# Patient Record
Sex: MALE | Race: BLACK OR AFRICAN AMERICAN | NOT HISPANIC OR LATINO | Employment: OTHER | URBAN - METROPOLITAN AREA
[De-identification: names, ages, dates, MRNs, and addresses within clinical notes are randomized per-mention and may not be internally consistent; named-entity substitution may affect disease eponyms.]

---

## 2017-01-04 ENCOUNTER — GENERIC CONVERSION - ENCOUNTER (OUTPATIENT)
Dept: OTHER | Facility: OTHER | Age: 72
End: 2017-01-04

## 2017-01-23 ENCOUNTER — ALLSCRIPTS OFFICE VISIT (OUTPATIENT)
Dept: OTHER | Facility: OTHER | Age: 72
End: 2017-01-23

## 2017-01-23 DIAGNOSIS — N39.0 URINARY TRACT INFECTION: ICD-10-CM

## 2017-01-23 DIAGNOSIS — I10 ESSENTIAL (PRIMARY) HYPERTENSION: ICD-10-CM

## 2017-01-23 DIAGNOSIS — I25.10 ATHEROSCLEROTIC HEART DISEASE OF NATIVE CORONARY ARTERY WITHOUT ANGINA PECTORIS: ICD-10-CM

## 2017-01-23 DIAGNOSIS — C67.9 MALIGNANT NEOPLASM OF BLADDER (HCC): ICD-10-CM

## 2017-02-02 ENCOUNTER — APPOINTMENT (OUTPATIENT)
Dept: LAB | Facility: HOSPITAL | Age: 72
End: 2017-02-02
Attending: INTERNAL MEDICINE
Payer: MEDICARE

## 2017-02-02 ENCOUNTER — GENERIC CONVERSION - ENCOUNTER (OUTPATIENT)
Dept: OTHER | Facility: OTHER | Age: 72
End: 2017-02-02

## 2017-02-02 DIAGNOSIS — C67.9 MALIGNANT NEOPLASM OF BLADDER (HCC): ICD-10-CM

## 2017-02-02 DIAGNOSIS — N39.0 URINARY TRACT INFECTION: ICD-10-CM

## 2017-02-02 LAB
BACTERIA UR QL AUTO: ABNORMAL /HPF
BILIRUB UR QL STRIP: NEGATIVE
CLARITY UR: ABNORMAL
COLOR UR: YELLOW
GLUCOSE UR STRIP-MCNC: ABNORMAL MG/DL
HGB UR QL STRIP.AUTO: ABNORMAL
KETONES UR STRIP-MCNC: NEGATIVE MG/DL
LEUKOCYTE ESTERASE UR QL STRIP: ABNORMAL
NITRITE UR QL STRIP: NEGATIVE
NON-SQ EPI CELLS URNS QL MICRO: ABNORMAL /HPF
PH UR STRIP.AUTO: 5.5 [PH] (ref 4.5–8)
PROT UR STRIP-MCNC: ABNORMAL MG/DL
RBC #/AREA URNS AUTO: ABNORMAL /HPF
SP GR UR STRIP.AUTO: 1.02 (ref 1–1.03)
UROBILINOGEN UR QL STRIP.AUTO: 0.2 E.U./DL
WBC #/AREA URNS AUTO: ABNORMAL /HPF

## 2017-02-02 PROCEDURE — 81001 URINALYSIS AUTO W/SCOPE: CPT

## 2017-02-02 PROCEDURE — 87086 URINE CULTURE/COLONY COUNT: CPT

## 2017-02-03 LAB — BACTERIA UR CULT: NORMAL

## 2017-02-08 ENCOUNTER — GENERIC CONVERSION - ENCOUNTER (OUTPATIENT)
Dept: OTHER | Facility: OTHER | Age: 72
End: 2017-02-08

## 2017-02-22 ENCOUNTER — HOSPITAL ENCOUNTER (INPATIENT)
Facility: HOSPITAL | Age: 72
LOS: 8 days | Discharge: HOME HEALTH | DRG: 574 | End: 2017-03-02
Attending: PODIATRIST | Admitting: PODIATRIST
Payer: MEDICARE

## 2017-02-22 ENCOUNTER — APPOINTMENT (INPATIENT)
Dept: MRI IMAGING | Facility: HOSPITAL | Age: 72
DRG: 574 | End: 2017-02-22
Payer: MEDICARE

## 2017-02-22 ENCOUNTER — GENERIC CONVERSION - ENCOUNTER (OUTPATIENT)
Dept: OTHER | Facility: OTHER | Age: 72
End: 2017-02-22

## 2017-02-22 ENCOUNTER — APPOINTMENT (INPATIENT)
Dept: RADIOLOGY | Facility: HOSPITAL | Age: 72
DRG: 574 | End: 2017-02-22
Payer: MEDICARE

## 2017-02-22 DIAGNOSIS — I15.9 SECONDARY HYPERTENSION: ICD-10-CM

## 2017-02-22 DIAGNOSIS — E78.5 HYPERLIPIDEMIA, UNSPECIFIED HYPERLIPIDEMIA TYPE: ICD-10-CM

## 2017-02-22 DIAGNOSIS — I25.10 ATHEROSCLEROSIS OF CORONARY ARTERY OF NATIVE HEART WITHOUT ANGINA PECTORIS, UNSPECIFIED VESSEL OR LESION TYPE: Primary | ICD-10-CM

## 2017-02-22 DIAGNOSIS — D09.0 CARCINOMA IN SITU OF BLADDER: ICD-10-CM

## 2017-02-22 DIAGNOSIS — L03.119 CELLULITIS AND ABSCESS OF FOOT: ICD-10-CM

## 2017-02-22 DIAGNOSIS — E11.622 TYPE 2 DIABETES MELLITUS WITH OTHER SKIN ULCER (HCC): ICD-10-CM

## 2017-02-22 DIAGNOSIS — J44.9 CHRONIC OBSTRUCTIVE PULMONARY DISEASE, UNSPECIFIED COPD TYPE (HCC): ICD-10-CM

## 2017-02-22 DIAGNOSIS — S91.331A PUNCTURE WOUND OF FOOT, RIGHT, INITIAL ENCOUNTER: ICD-10-CM

## 2017-02-22 DIAGNOSIS — L02.619 CELLULITIS AND ABSCESS OF FOOT: ICD-10-CM

## 2017-02-22 DIAGNOSIS — L98.499 TYPE 2 DIABETES MELLITUS WITH OTHER SKIN ULCER (HCC): ICD-10-CM

## 2017-02-22 PROBLEM — E11.628 DIABETIC INFECTION OF RIGHT FOOT (HCC): Status: ACTIVE | Noted: 2017-02-22

## 2017-02-22 PROBLEM — L08.9 DIABETIC INFECTION OF RIGHT FOOT (HCC): Status: ACTIVE | Noted: 2017-02-22

## 2017-02-22 LAB
ALBUMIN SERPL BCP-MCNC: 3.1 G/DL (ref 3.5–5)
ALP SERPL-CCNC: 108 U/L (ref 46–116)
ALT SERPL W P-5'-P-CCNC: 59 U/L (ref 12–78)
ANION GAP SERPL CALCULATED.3IONS-SCNC: 7 MMOL/L (ref 4–13)
AST SERPL W P-5'-P-CCNC: 29 U/L (ref 5–45)
ATRIAL RATE: 85 BPM
BASOPHILS # BLD AUTO: 0.02 THOUSANDS/ΜL (ref 0–0.1)
BASOPHILS NFR BLD AUTO: 0 % (ref 0–1)
BILIRUB SERPL-MCNC: 0.76 MG/DL (ref 0.2–1)
BUN SERPL-MCNC: 23 MG/DL (ref 5–25)
CALCIUM SERPL-MCNC: 8.6 MG/DL (ref 8.3–10.1)
CHLORIDE SERPL-SCNC: 102 MMOL/L (ref 100–108)
CO2 SERPL-SCNC: 30 MMOL/L (ref 21–32)
CREAT SERPL-MCNC: 1.71 MG/DL (ref 0.6–1.3)
EOSINOPHIL # BLD AUTO: 0.06 THOUSAND/ΜL (ref 0–0.61)
EOSINOPHIL NFR BLD AUTO: 0 % (ref 0–6)
ERYTHROCYTE [DISTWIDTH] IN BLOOD BY AUTOMATED COUNT: 13.2 % (ref 11.6–15.1)
ERYTHROCYTE [SEDIMENTATION RATE] IN BLOOD: 62 MM/HOUR (ref 0–10)
EST. AVERAGE GLUCOSE BLD GHB EST-MCNC: 217 MG/DL
GFR SERPL CREATININE-BSD FRML MDRD: 47.7 ML/MIN/1.73SQ M
GLUCOSE SERPL-MCNC: 109 MG/DL (ref 65–140)
GLUCOSE SERPL-MCNC: 220 MG/DL (ref 65–140)
GLUCOSE SERPL-MCNC: 68 MG/DL (ref 65–140)
HBA1C MFR BLD: 9.2 % (ref 4.2–6.3)
HCT VFR BLD AUTO: 34.4 % (ref 36.5–49.3)
HGB BLD-MCNC: 11.8 G/DL (ref 12–17)
LYMPHOCYTES # BLD AUTO: 1.56 THOUSANDS/ΜL (ref 0.6–4.47)
LYMPHOCYTES NFR BLD AUTO: 10 % (ref 14–44)
MCH RBC QN AUTO: 31.1 PG (ref 26.8–34.3)
MCHC RBC AUTO-ENTMCNC: 34.3 G/DL (ref 31.4–37.4)
MCV RBC AUTO: 91 FL (ref 82–98)
MONOCYTES # BLD AUTO: 1.13 THOUSAND/ΜL (ref 0.17–1.22)
MONOCYTES NFR BLD AUTO: 7 % (ref 4–12)
NEUTROPHILS # BLD AUTO: 12.88 THOUSANDS/ΜL (ref 1.85–7.62)
NEUTS SEG NFR BLD AUTO: 83 % (ref 43–75)
NRBC BLD AUTO-RTO: 0 /100 WBCS
P AXIS: 57 DEGREES
PLATELET # BLD AUTO: 154 THOUSANDS/UL (ref 149–390)
PMV BLD AUTO: 11.3 FL (ref 8.9–12.7)
POTASSIUM SERPL-SCNC: 4.1 MMOL/L (ref 3.5–5.3)
PR INTERVAL: 180 MS
PROT SERPL-MCNC: 7.2 G/DL (ref 6.4–8.2)
QRS AXIS: -8 DEGREES
QRSD INTERVAL: 94 MS
QT INTERVAL: 344 MS
QTC INTERVAL: 409 MS
RBC # BLD AUTO: 3.79 MILLION/UL (ref 3.88–5.62)
SODIUM SERPL-SCNC: 139 MMOL/L (ref 136–145)
T WAVE AXIS: 52 DEGREES
VENTRICULAR RATE: 85 BPM
WBC # BLD AUTO: 15.65 THOUSAND/UL (ref 4.31–10.16)

## 2017-02-22 PROCEDURE — 87205 SMEAR GRAM STAIN: CPT | Performed by: PODIATRIST

## 2017-02-22 PROCEDURE — 73630 X-RAY EXAM OF FOOT: CPT

## 2017-02-22 PROCEDURE — 87070 CULTURE OTHR SPECIMN AEROBIC: CPT | Performed by: PODIATRIST

## 2017-02-22 PROCEDURE — 73720 MRI LWR EXTREMITY W/O&W/DYE: CPT

## 2017-02-22 PROCEDURE — 85025 COMPLETE CBC W/AUTO DIFF WBC: CPT | Performed by: PODIATRIST

## 2017-02-22 PROCEDURE — 87040 BLOOD CULTURE FOR BACTERIA: CPT | Performed by: PODIATRIST

## 2017-02-22 PROCEDURE — 93005 ELECTROCARDIOGRAM TRACING: CPT | Performed by: PODIATRIST

## 2017-02-22 PROCEDURE — 82948 REAGENT STRIP/BLOOD GLUCOSE: CPT

## 2017-02-22 PROCEDURE — 80053 COMPREHEN METABOLIC PANEL: CPT | Performed by: PODIATRIST

## 2017-02-22 PROCEDURE — 87147 CULTURE TYPE IMMUNOLOGIC: CPT | Performed by: PODIATRIST

## 2017-02-22 PROCEDURE — 71020 HB CHEST X-RAY 2VW FRONTAL&LATL: CPT

## 2017-02-22 PROCEDURE — 83036 HEMOGLOBIN GLYCOSYLATED A1C: CPT | Performed by: PODIATRIST

## 2017-02-22 PROCEDURE — 85652 RBC SED RATE AUTOMATED: CPT | Performed by: PODIATRIST

## 2017-02-22 PROCEDURE — 86317 IMMUNOASSAY INFECTIOUS AGENT: CPT | Performed by: PODIATRIST

## 2017-02-22 RX ORDER — PANTOPRAZOLE SODIUM 40 MG/1
40 TABLET, DELAYED RELEASE ORAL
Status: DISCONTINUED | OUTPATIENT
Start: 2017-02-23 | End: 2017-03-02 | Stop reason: HOSPADM

## 2017-02-22 RX ORDER — DOCUSATE SODIUM 100 MG/1
100 CAPSULE, LIQUID FILLED ORAL 2 TIMES DAILY
Status: DISCONTINUED | OUTPATIENT
Start: 2017-02-22 | End: 2017-03-02 | Stop reason: HOSPADM

## 2017-02-22 RX ORDER — ISOSORBIDE MONONITRATE 30 MG/1
30 TABLET, EXTENDED RELEASE ORAL DAILY
Status: DISCONTINUED | OUTPATIENT
Start: 2017-02-23 | End: 2017-03-02 | Stop reason: HOSPADM

## 2017-02-22 RX ORDER — INSULIN GLARGINE 100 [IU]/ML
55 INJECTION, SOLUTION SUBCUTANEOUS
Status: DISCONTINUED | OUTPATIENT
Start: 2017-02-22 | End: 2017-03-02 | Stop reason: HOSPADM

## 2017-02-22 RX ORDER — FLUTICASONE PROPIONATE 50 MCG
2 SPRAY, SUSPENSION (ML) NASAL
Status: DISCONTINUED | OUTPATIENT
Start: 2017-02-23 | End: 2017-03-02 | Stop reason: HOSPADM

## 2017-02-22 RX ORDER — ACETAMINOPHEN 325 MG/1
650 TABLET ORAL EVERY 6 HOURS PRN
Status: DISCONTINUED | OUTPATIENT
Start: 2017-02-22 | End: 2017-03-02 | Stop reason: HOSPADM

## 2017-02-22 RX ORDER — GABAPENTIN 300 MG/1
600 CAPSULE ORAL
Status: DISCONTINUED | OUTPATIENT
Start: 2017-02-22 | End: 2017-03-02 | Stop reason: HOSPADM

## 2017-02-22 RX ORDER — ONDANSETRON 2 MG/ML
4 INJECTION INTRAMUSCULAR; INTRAVENOUS EVERY 6 HOURS PRN
Status: DISCONTINUED | OUTPATIENT
Start: 2017-02-22 | End: 2017-03-02 | Stop reason: HOSPADM

## 2017-02-22 RX ORDER — METRONIDAZOLE 500 MG/1
500 TABLET ORAL EVERY 8 HOURS SCHEDULED
Status: DISCONTINUED | OUTPATIENT
Start: 2017-02-22 | End: 2017-02-24

## 2017-02-22 RX ORDER — METOPROLOL SUCCINATE 50 MG/1
100 TABLET, EXTENDED RELEASE ORAL DAILY
Status: DISCONTINUED | OUTPATIENT
Start: 2017-02-23 | End: 2017-03-02 | Stop reason: HOSPADM

## 2017-02-22 RX ORDER — OXYCODONE HYDROCHLORIDE 5 MG/1
5 TABLET ORAL EVERY 4 HOURS PRN
Status: DISCONTINUED | OUTPATIENT
Start: 2017-02-22 | End: 2017-02-26 | Stop reason: SDUPTHER

## 2017-02-22 RX ORDER — HEPARIN SODIUM 5000 [USP'U]/ML
5000 INJECTION, SOLUTION INTRAVENOUS; SUBCUTANEOUS EVERY 8 HOURS SCHEDULED
Status: DISCONTINUED | OUTPATIENT
Start: 2017-02-22 | End: 2017-03-02 | Stop reason: HOSPADM

## 2017-02-22 RX ORDER — ASPIRIN 81 MG/1
81 TABLET, CHEWABLE ORAL DAILY
Status: DISCONTINUED | OUTPATIENT
Start: 2017-02-23 | End: 2017-03-02 | Stop reason: HOSPADM

## 2017-02-22 RX ORDER — AZELASTINE 1 MG/ML
1 SPRAY, METERED NASAL 2 TIMES DAILY
Status: DISCONTINUED | OUTPATIENT
Start: 2017-02-22 | End: 2017-03-02 | Stop reason: HOSPADM

## 2017-02-22 RX ORDER — FUROSEMIDE 20 MG/1
20 TABLET ORAL
Status: DISCONTINUED | OUTPATIENT
Start: 2017-02-22 | End: 2017-02-24

## 2017-02-22 RX ADMIN — DOCUSATE SODIUM 100 MG: 100 CAPSULE, LIQUID FILLED ORAL at 19:35

## 2017-02-22 RX ADMIN — INSULIN GLARGINE 55 UNITS: 100 INJECTION, SOLUTION SUBCUTANEOUS at 21:36

## 2017-02-22 RX ADMIN — FUROSEMIDE 20 MG: 20 TABLET ORAL at 19:35

## 2017-02-22 RX ADMIN — GABAPENTIN 600 MG: 300 CAPSULE ORAL at 21:36

## 2017-02-22 RX ADMIN — METRONIDAZOLE 500 MG: 500 TABLET ORAL at 19:35

## 2017-02-22 RX ADMIN — BIMATOPROST 1 DROP: 0.1 SOLUTION/ DROPS OPHTHALMIC at 21:40

## 2017-02-22 RX ADMIN — METRONIDAZOLE 500 MG: 500 TABLET ORAL at 21:36

## 2017-02-22 RX ADMIN — CEFAZOLIN SODIUM 2000 MG: 2 SOLUTION INTRAVENOUS at 19:29

## 2017-02-22 RX ADMIN — HEPARIN SODIUM 5000 UNITS: 5000 INJECTION, SOLUTION INTRAVENOUS; SUBCUTANEOUS at 21:36

## 2017-02-22 RX ADMIN — OXYCODONE HYDROCHLORIDE 5 MG: 5 TABLET ORAL at 21:36

## 2017-02-23 ENCOUNTER — GENERIC CONVERSION - ENCOUNTER (OUTPATIENT)
Dept: OTHER | Facility: OTHER | Age: 72
End: 2017-02-23

## 2017-02-23 LAB
ANION GAP SERPL CALCULATED.3IONS-SCNC: 6 MMOL/L (ref 4–13)
BUN SERPL-MCNC: 23 MG/DL (ref 5–25)
CALCIUM SERPL-MCNC: 8.5 MG/DL (ref 8.3–10.1)
CHLORIDE SERPL-SCNC: 102 MMOL/L (ref 100–108)
CO2 SERPL-SCNC: 30 MMOL/L (ref 21–32)
CREAT SERPL-MCNC: 1.52 MG/DL (ref 0.6–1.3)
ERYTHROCYTE [DISTWIDTH] IN BLOOD BY AUTOMATED COUNT: 12.9 % (ref 11.6–15.1)
GFR SERPL CREATININE-BSD FRML MDRD: 54.6 ML/MIN/1.73SQ M
GLUCOSE SERPL-MCNC: 129 MG/DL (ref 65–140)
GLUCOSE SERPL-MCNC: 144 MG/DL (ref 65–140)
GLUCOSE SERPL-MCNC: 145 MG/DL (ref 65–140)
GLUCOSE SERPL-MCNC: 148 MG/DL (ref 65–140)
GLUCOSE SERPL-MCNC: 91 MG/DL (ref 65–140)
HCT VFR BLD AUTO: 35.4 % (ref 36.5–49.3)
HGB BLD-MCNC: 12.1 G/DL (ref 12–17)
MCH RBC QN AUTO: 30.9 PG (ref 26.8–34.3)
MCHC RBC AUTO-ENTMCNC: 34.2 G/DL (ref 31.4–37.4)
MCV RBC AUTO: 91 FL (ref 82–98)
PLATELET # BLD AUTO: 169 THOUSANDS/UL (ref 149–390)
PMV BLD AUTO: 10.6 FL (ref 8.9–12.7)
POTASSIUM SERPL-SCNC: 3.9 MMOL/L (ref 3.5–5.3)
RBC # BLD AUTO: 3.91 MILLION/UL (ref 3.88–5.62)
SODIUM SERPL-SCNC: 138 MMOL/L (ref 136–145)
WBC # BLD AUTO: 14.27 THOUSAND/UL (ref 4.31–10.16)

## 2017-02-23 PROCEDURE — A9585 GADOBUTROL INJECTION: HCPCS | Performed by: PODIATRIST

## 2017-02-23 PROCEDURE — 82948 REAGENT STRIP/BLOOD GLUCOSE: CPT

## 2017-02-23 PROCEDURE — 85027 COMPLETE CBC AUTOMATED: CPT | Performed by: PODIATRIST

## 2017-02-23 PROCEDURE — 80048 BASIC METABOLIC PNL TOTAL CA: CPT | Performed by: PODIATRIST

## 2017-02-23 RX ADMIN — INSULIN LISPRO 15 UNITS: 100 INJECTION, SOLUTION INTRAVENOUS; SUBCUTANEOUS at 12:08

## 2017-02-23 RX ADMIN — OXYCODONE HYDROCHLORIDE 5 MG: 5 TABLET ORAL at 01:43

## 2017-02-23 RX ADMIN — GABAPENTIN 600 MG: 300 CAPSULE ORAL at 21:44

## 2017-02-23 RX ADMIN — HEPARIN SODIUM 5000 UNITS: 5000 INJECTION, SOLUTION INTRAVENOUS; SUBCUTANEOUS at 13:37

## 2017-02-23 RX ADMIN — PANTOPRAZOLE SODIUM 40 MG: 40 TABLET, DELAYED RELEASE ORAL at 05:43

## 2017-02-23 RX ADMIN — FUROSEMIDE 20 MG: 20 TABLET ORAL at 17:11

## 2017-02-23 RX ADMIN — METRONIDAZOLE 500 MG: 500 TABLET ORAL at 13:37

## 2017-02-23 RX ADMIN — CEFAZOLIN SODIUM 2000 MG: 2 SOLUTION INTRAVENOUS at 09:20

## 2017-02-23 RX ADMIN — DOCUSATE SODIUM 100 MG: 100 CAPSULE, LIQUID FILLED ORAL at 17:19

## 2017-02-23 RX ADMIN — CEFAZOLIN SODIUM 2000 MG: 2 SOLUTION INTRAVENOUS at 00:50

## 2017-02-23 RX ADMIN — CEFAZOLIN SODIUM 2000 MG: 2 SOLUTION INTRAVENOUS at 17:11

## 2017-02-23 RX ADMIN — INSULIN GLARGINE 55 UNITS: 100 INJECTION, SOLUTION SUBCUTANEOUS at 21:43

## 2017-02-23 RX ADMIN — OXYCODONE HYDROCHLORIDE 5 MG: 5 TABLET ORAL at 13:51

## 2017-02-23 RX ADMIN — METOPROLOL SUCCINATE 100 MG: 50 TABLET, EXTENDED RELEASE ORAL at 08:30

## 2017-02-23 RX ADMIN — INSULIN LISPRO 15 UNITS: 100 INJECTION, SOLUTION INTRAVENOUS; SUBCUTANEOUS at 08:31

## 2017-02-23 RX ADMIN — HEPARIN SODIUM 5000 UNITS: 5000 INJECTION, SOLUTION INTRAVENOUS; SUBCUTANEOUS at 21:43

## 2017-02-23 RX ADMIN — INSULIN LISPRO 15 UNITS: 100 INJECTION, SOLUTION INTRAVENOUS; SUBCUTANEOUS at 17:11

## 2017-02-23 RX ADMIN — ISOSORBIDE MONONITRATE 30 MG: 30 TABLET, EXTENDED RELEASE ORAL at 08:30

## 2017-02-23 RX ADMIN — METRONIDAZOLE 500 MG: 500 TABLET ORAL at 21:44

## 2017-02-23 RX ADMIN — HEPARIN SODIUM 5000 UNITS: 5000 INJECTION, SOLUTION INTRAVENOUS; SUBCUTANEOUS at 05:43

## 2017-02-23 RX ADMIN — ASPIRIN 81 MG 81 MG: 81 TABLET ORAL at 08:30

## 2017-02-23 RX ADMIN — GADOBUTROL 11 ML: 604.72 INJECTION INTRAVENOUS at 01:11

## 2017-02-23 RX ADMIN — BIMATOPROST 1 DROP: 0.1 SOLUTION/ DROPS OPHTHALMIC at 21:43

## 2017-02-23 RX ADMIN — DOCUSATE SODIUM 100 MG: 100 CAPSULE, LIQUID FILLED ORAL at 08:30

## 2017-02-23 RX ADMIN — METRONIDAZOLE 500 MG: 500 TABLET ORAL at 05:43

## 2017-02-23 RX ADMIN — FUROSEMIDE 20 MG: 20 TABLET ORAL at 08:30

## 2017-02-23 RX ADMIN — OXYCODONE HYDROCHLORIDE 5 MG: 5 TABLET ORAL at 05:47

## 2017-02-24 LAB
ANION GAP SERPL CALCULATED.3IONS-SCNC: 9 MMOL/L (ref 4–13)
BACTERIA WND AEROBE CULT: NORMAL
BUN SERPL-MCNC: 19 MG/DL (ref 5–25)
C TETANI IGG SER IA-ACNC: 5.68 IU/ML
CALCIUM SERPL-MCNC: 8.6 MG/DL (ref 8.3–10.1)
CHLORIDE SERPL-SCNC: 100 MMOL/L (ref 100–108)
CO2 SERPL-SCNC: 30 MMOL/L (ref 21–32)
CREAT SERPL-MCNC: 1.36 MG/DL (ref 0.6–1.3)
ERYTHROCYTE [DISTWIDTH] IN BLOOD BY AUTOMATED COUNT: 13.1 % (ref 11.6–15.1)
GFR SERPL CREATININE-BSD FRML MDRD: >60 ML/MIN/1.73SQ M
GLUCOSE SERPL-MCNC: 104 MG/DL (ref 65–140)
GLUCOSE SERPL-MCNC: 114 MG/DL (ref 65–140)
GLUCOSE SERPL-MCNC: 197 MG/DL (ref 65–140)
GLUCOSE SERPL-MCNC: 96 MG/DL (ref 65–140)
GLUCOSE SERPL-MCNC: 97 MG/DL (ref 65–140)
GRAM STN SPEC: NORMAL
GRAM STN SPEC: NORMAL
HCT VFR BLD AUTO: 34 % (ref 36.5–49.3)
HGB BLD-MCNC: 11.7 G/DL (ref 12–17)
MCH RBC QN AUTO: 31.4 PG (ref 26.8–34.3)
MCHC RBC AUTO-ENTMCNC: 34.4 G/DL (ref 31.4–37.4)
MCV RBC AUTO: 91 FL (ref 82–98)
PLATELET # BLD AUTO: 168 THOUSANDS/UL (ref 149–390)
PMV BLD AUTO: 11.3 FL (ref 8.9–12.7)
POTASSIUM SERPL-SCNC: 3.7 MMOL/L (ref 3.5–5.3)
PREALB SERPL-MCNC: 12.2 MG/DL (ref 18–40)
RBC # BLD AUTO: 3.73 MILLION/UL (ref 3.88–5.62)
SODIUM SERPL-SCNC: 139 MMOL/L (ref 136–145)
WBC # BLD AUTO: 12.2 THOUSAND/UL (ref 4.31–10.16)

## 2017-02-24 PROCEDURE — 80048 BASIC METABOLIC PNL TOTAL CA: CPT | Performed by: PODIATRIST

## 2017-02-24 PROCEDURE — 85027 COMPLETE CBC AUTOMATED: CPT | Performed by: PODIATRIST

## 2017-02-24 PROCEDURE — 84134 ASSAY OF PREALBUMIN: CPT | Performed by: PODIATRIST

## 2017-02-24 PROCEDURE — 82948 REAGENT STRIP/BLOOD GLUCOSE: CPT

## 2017-02-24 RX ORDER — FUROSEMIDE 20 MG/1
20 TABLET ORAL DAILY
Status: DISCONTINUED | OUTPATIENT
Start: 2017-02-24 | End: 2017-03-02 | Stop reason: HOSPADM

## 2017-02-24 RX ADMIN — DOCUSATE SODIUM 100 MG: 100 CAPSULE, LIQUID FILLED ORAL at 08:47

## 2017-02-24 RX ADMIN — HEPARIN SODIUM 5000 UNITS: 5000 INJECTION, SOLUTION INTRAVENOUS; SUBCUTANEOUS at 05:01

## 2017-02-24 RX ADMIN — PANTOPRAZOLE SODIUM 40 MG: 40 TABLET, DELAYED RELEASE ORAL at 05:01

## 2017-02-24 RX ADMIN — METRONIDAZOLE 500 MG: 500 TABLET ORAL at 05:01

## 2017-02-24 RX ADMIN — DOCUSATE SODIUM 100 MG: 100 CAPSULE, LIQUID FILLED ORAL at 17:05

## 2017-02-24 RX ADMIN — INSULIN LISPRO 15 UNITS: 100 INJECTION, SOLUTION INTRAVENOUS; SUBCUTANEOUS at 11:41

## 2017-02-24 RX ADMIN — GABAPENTIN 600 MG: 300 CAPSULE ORAL at 22:07

## 2017-02-24 RX ADMIN — OXYCODONE HYDROCHLORIDE 5 MG: 5 TABLET ORAL at 19:25

## 2017-02-24 RX ADMIN — OXYCODONE HYDROCHLORIDE 5 MG: 5 TABLET ORAL at 13:30

## 2017-02-24 RX ADMIN — METRONIDAZOLE 500 MG: 500 TABLET ORAL at 13:26

## 2017-02-24 RX ADMIN — ASPIRIN 81 MG 81 MG: 81 TABLET ORAL at 08:47

## 2017-02-24 RX ADMIN — ISOSORBIDE MONONITRATE 30 MG: 30 TABLET, EXTENDED RELEASE ORAL at 08:47

## 2017-02-24 RX ADMIN — CEFAZOLIN SODIUM 2000 MG: 2 SOLUTION INTRAVENOUS at 17:05

## 2017-02-24 RX ADMIN — HEPARIN SODIUM 5000 UNITS: 5000 INJECTION, SOLUTION INTRAVENOUS; SUBCUTANEOUS at 13:26

## 2017-02-24 RX ADMIN — OXYCODONE HYDROCHLORIDE 5 MG: 5 TABLET ORAL at 04:11

## 2017-02-24 RX ADMIN — HEPARIN SODIUM 5000 UNITS: 5000 INJECTION, SOLUTION INTRAVENOUS; SUBCUTANEOUS at 22:07

## 2017-02-24 RX ADMIN — METOPROLOL SUCCINATE 100 MG: 50 TABLET, EXTENDED RELEASE ORAL at 08:47

## 2017-02-24 RX ADMIN — OXYCODONE HYDROCHLORIDE 5 MG: 5 TABLET ORAL at 09:44

## 2017-02-24 RX ADMIN — INSULIN LISPRO 2 UNITS: 100 INJECTION, SOLUTION INTRAVENOUS; SUBCUTANEOUS at 11:41

## 2017-02-24 RX ADMIN — CEFAZOLIN SODIUM 2000 MG: 2 SOLUTION INTRAVENOUS at 08:48

## 2017-02-24 RX ADMIN — INSULIN LISPRO 15 UNITS: 100 INJECTION, SOLUTION INTRAVENOUS; SUBCUTANEOUS at 08:49

## 2017-02-24 RX ADMIN — CEFAZOLIN SODIUM 2000 MG: 2 SOLUTION INTRAVENOUS at 01:44

## 2017-02-24 RX ADMIN — BIMATOPROST 1 DROP: 0.1 SOLUTION/ DROPS OPHTHALMIC at 22:07

## 2017-02-24 RX ADMIN — OXYCODONE HYDROCHLORIDE 5 MG: 5 TABLET ORAL at 00:11

## 2017-02-24 RX ADMIN — FUROSEMIDE 20 MG: 20 TABLET ORAL at 08:47

## 2017-02-24 RX ADMIN — OXYCODONE HYDROCHLORIDE 5 MG: 5 TABLET ORAL at 23:39

## 2017-02-24 RX ADMIN — INSULIN LISPRO 15 UNITS: 100 INJECTION, SOLUTION INTRAVENOUS; SUBCUTANEOUS at 16:38

## 2017-02-25 ENCOUNTER — ANESTHESIA (INPATIENT)
Dept: PERIOP | Facility: HOSPITAL | Age: 72
DRG: 574 | End: 2017-02-25
Payer: MEDICARE

## 2017-02-25 ENCOUNTER — ANESTHESIA EVENT (INPATIENT)
Dept: PERIOP | Facility: HOSPITAL | Age: 72
DRG: 574 | End: 2017-02-25
Payer: MEDICARE

## 2017-02-25 LAB
ANION GAP SERPL CALCULATED.3IONS-SCNC: 9 MMOL/L (ref 4–13)
BUN SERPL-MCNC: 20 MG/DL (ref 5–25)
CALCIUM SERPL-MCNC: 8.5 MG/DL (ref 8.3–10.1)
CHLORIDE SERPL-SCNC: 100 MMOL/L (ref 100–108)
CO2 SERPL-SCNC: 29 MMOL/L (ref 21–32)
CREAT SERPL-MCNC: 1.34 MG/DL (ref 0.6–1.3)
ERYTHROCYTE [DISTWIDTH] IN BLOOD BY AUTOMATED COUNT: 13 % (ref 11.6–15.1)
GFR SERPL CREATININE-BSD FRML MDRD: >60 ML/MIN/1.73SQ M
GLUCOSE SERPL-MCNC: 102 MG/DL (ref 65–140)
GLUCOSE SERPL-MCNC: 114 MG/DL (ref 65–140)
GLUCOSE SERPL-MCNC: 123 MG/DL (ref 65–140)
GLUCOSE SERPL-MCNC: 133 MG/DL (ref 65–140)
GLUCOSE SERPL-MCNC: 269 MG/DL (ref 65–140)
HCT VFR BLD AUTO: 35 % (ref 36.5–49.3)
HGB BLD-MCNC: 12 G/DL (ref 12–17)
MCH RBC QN AUTO: 31.4 PG (ref 26.8–34.3)
MCHC RBC AUTO-ENTMCNC: 34.3 G/DL (ref 31.4–37.4)
MCV RBC AUTO: 92 FL (ref 82–98)
PLATELET # BLD AUTO: 169 THOUSANDS/UL (ref 149–390)
PMV BLD AUTO: 11 FL (ref 8.9–12.7)
POTASSIUM SERPL-SCNC: 4 MMOL/L (ref 3.5–5.3)
RBC # BLD AUTO: 3.82 MILLION/UL (ref 3.88–5.62)
SODIUM SERPL-SCNC: 138 MMOL/L (ref 136–145)
WBC # BLD AUTO: 12.78 THOUSAND/UL (ref 4.31–10.16)

## 2017-02-25 PROCEDURE — 85027 COMPLETE CBC AUTOMATED: CPT | Performed by: PODIATRIST

## 2017-02-25 PROCEDURE — 80048 BASIC METABOLIC PNL TOTAL CA: CPT | Performed by: PODIATRIST

## 2017-02-25 PROCEDURE — 82948 REAGENT STRIP/BLOOD GLUCOSE: CPT

## 2017-02-25 RX ORDER — POLYETHYLENE GLYCOL 3350 17 G/17G
17 POWDER, FOR SOLUTION ORAL DAILY
Status: DISCONTINUED | OUTPATIENT
Start: 2017-02-25 | End: 2017-03-02 | Stop reason: HOSPADM

## 2017-02-25 RX ADMIN — AZELASTINE HYDROCHLORIDE 1 SPRAY: 137 SPRAY, METERED NASAL at 17:19

## 2017-02-25 RX ADMIN — CEFAZOLIN SODIUM 2000 MG: 2 SOLUTION INTRAVENOUS at 17:18

## 2017-02-25 RX ADMIN — DOCUSATE SODIUM 100 MG: 100 CAPSULE, LIQUID FILLED ORAL at 08:51

## 2017-02-25 RX ADMIN — INSULIN LISPRO 15 UNITS: 100 INJECTION, SOLUTION INTRAVENOUS; SUBCUTANEOUS at 11:38

## 2017-02-25 RX ADMIN — ISOSORBIDE MONONITRATE 30 MG: 30 TABLET, EXTENDED RELEASE ORAL at 08:51

## 2017-02-25 RX ADMIN — HEPARIN SODIUM 5000 UNITS: 5000 INJECTION, SOLUTION INTRAVENOUS; SUBCUTANEOUS at 14:19

## 2017-02-25 RX ADMIN — GABAPENTIN 600 MG: 300 CAPSULE ORAL at 22:21

## 2017-02-25 RX ADMIN — PANTOPRAZOLE SODIUM 40 MG: 40 TABLET, DELAYED RELEASE ORAL at 05:22

## 2017-02-25 RX ADMIN — CEFAZOLIN SODIUM 2000 MG: 2 SOLUTION INTRAVENOUS at 10:14

## 2017-02-25 RX ADMIN — INSULIN LISPRO 15 UNITS: 100 INJECTION, SOLUTION INTRAVENOUS; SUBCUTANEOUS at 08:50

## 2017-02-25 RX ADMIN — OXYCODONE HYDROCHLORIDE 5 MG: 5 TABLET ORAL at 22:21

## 2017-02-25 RX ADMIN — METOPROLOL SUCCINATE 100 MG: 50 TABLET, EXTENDED RELEASE ORAL at 08:50

## 2017-02-25 RX ADMIN — INSULIN LISPRO 3 UNITS: 100 INJECTION, SOLUTION INTRAVENOUS; SUBCUTANEOUS at 11:39

## 2017-02-25 RX ADMIN — FUROSEMIDE 20 MG: 20 TABLET ORAL at 08:50

## 2017-02-25 RX ADMIN — CEFAZOLIN SODIUM 2000 MG: 2 SOLUTION INTRAVENOUS at 01:28

## 2017-02-25 RX ADMIN — INSULIN LISPRO 15 UNITS: 100 INJECTION, SOLUTION INTRAVENOUS; SUBCUTANEOUS at 16:34

## 2017-02-25 RX ADMIN — HEPARIN SODIUM 5000 UNITS: 5000 INJECTION, SOLUTION INTRAVENOUS; SUBCUTANEOUS at 05:22

## 2017-02-25 RX ADMIN — BIMATOPROST 1 DROP: 0.1 SOLUTION/ DROPS OPHTHALMIC at 22:21

## 2017-02-25 RX ADMIN — POLYETHYLENE GLYCOL 3350 17 G: 17 POWDER, FOR SOLUTION ORAL at 10:14

## 2017-02-25 RX ADMIN — ASPIRIN 81 MG 81 MG: 81 TABLET ORAL at 08:51

## 2017-02-25 RX ADMIN — BISACODYL 10 MG: 5 TABLET, COATED ORAL at 10:15

## 2017-02-26 LAB
ANION GAP SERPL CALCULATED.3IONS-SCNC: 8 MMOL/L (ref 4–13)
BUN SERPL-MCNC: 20 MG/DL (ref 5–25)
CALCIUM SERPL-MCNC: 8.7 MG/DL (ref 8.3–10.1)
CHLORIDE SERPL-SCNC: 98 MMOL/L (ref 100–108)
CO2 SERPL-SCNC: 32 MMOL/L (ref 21–32)
CREAT SERPL-MCNC: 1.39 MG/DL (ref 0.6–1.3)
ERYTHROCYTE [DISTWIDTH] IN BLOOD BY AUTOMATED COUNT: 13 % (ref 11.6–15.1)
GFR SERPL CREATININE-BSD FRML MDRD: >60 ML/MIN/1.73SQ M
GLUCOSE SERPL-MCNC: 172 MG/DL (ref 65–140)
GLUCOSE SERPL-MCNC: 176 MG/DL (ref 65–140)
GLUCOSE SERPL-MCNC: 190 MG/DL (ref 65–140)
GLUCOSE SERPL-MCNC: 198 MG/DL (ref 65–140)
GLUCOSE SERPL-MCNC: 234 MG/DL (ref 65–140)
GLUCOSE SERPL-MCNC: 306 MG/DL (ref 65–140)
HCT VFR BLD AUTO: 37.2 % (ref 36.5–49.3)
HGB BLD-MCNC: 12.7 G/DL (ref 12–17)
MCH RBC QN AUTO: 31.6 PG (ref 26.8–34.3)
MCHC RBC AUTO-ENTMCNC: 34.1 G/DL (ref 31.4–37.4)
MCV RBC AUTO: 93 FL (ref 82–98)
PLATELET # BLD AUTO: 178 THOUSANDS/UL (ref 149–390)
PMV BLD AUTO: 10.4 FL (ref 8.9–12.7)
POTASSIUM SERPL-SCNC: 4.7 MMOL/L (ref 3.5–5.3)
RBC # BLD AUTO: 4.02 MILLION/UL (ref 3.88–5.62)
SODIUM SERPL-SCNC: 138 MMOL/L (ref 136–145)
WBC # BLD AUTO: 14.4 THOUSAND/UL (ref 4.31–10.16)

## 2017-02-26 PROCEDURE — 80048 BASIC METABOLIC PNL TOTAL CA: CPT | Performed by: PODIATRIST

## 2017-02-26 PROCEDURE — 82948 REAGENT STRIP/BLOOD GLUCOSE: CPT

## 2017-02-26 PROCEDURE — 87205 SMEAR GRAM STAIN: CPT | Performed by: PODIATRIST

## 2017-02-26 PROCEDURE — 2W1SX6Z COMPRESSION OF RIGHT FOOT USING PRESSURE DRESSING: ICD-10-PCS | Performed by: PODIATRIST

## 2017-02-26 PROCEDURE — 0JBQ0ZZ EXCISION OF RIGHT FOOT SUBCUTANEOUS TISSUE AND FASCIA, OPEN APPROACH: ICD-10-PCS | Performed by: PODIATRIST

## 2017-02-26 PROCEDURE — 87147 CULTURE TYPE IMMUNOLOGIC: CPT | Performed by: PODIATRIST

## 2017-02-26 PROCEDURE — 85027 COMPLETE CBC AUTOMATED: CPT | Performed by: PODIATRIST

## 2017-02-26 PROCEDURE — 87070 CULTURE OTHR SPECIMN AEROBIC: CPT | Performed by: PODIATRIST

## 2017-02-26 PROCEDURE — 0J9Q0ZX DRAINAGE OF RIGHT FOOT SUBCUTANEOUS TISSUE AND FASCIA, OPEN APPROACH, DIAGNOSTIC: ICD-10-PCS | Performed by: PODIATRIST

## 2017-02-26 PROCEDURE — 87176 TISSUE HOMOGENIZATION CULTR: CPT | Performed by: PODIATRIST

## 2017-02-26 PROCEDURE — 87075 CULTR BACTERIA EXCEPT BLOOD: CPT | Performed by: PODIATRIST

## 2017-02-26 PROCEDURE — 0HRMXK3 REPLACEMENT OF RIGHT FOOT SKIN WITH NONAUTOLOGOUS TISSUE SUBSTITUTE, FULL THICKNESS, EXTERNAL APPROACH: ICD-10-PCS | Performed by: PODIATRIST

## 2017-02-26 DEVICE — IMPLANTABLE DEVICE: Type: IMPLANTABLE DEVICE | Site: FOOT | Status: FUNCTIONAL

## 2017-02-26 RX ORDER — MAGNESIUM HYDROXIDE 1200 MG/15ML
LIQUID ORAL AS NEEDED
Status: DISCONTINUED | OUTPATIENT
Start: 2017-02-26 | End: 2017-02-26 | Stop reason: HOSPADM

## 2017-02-26 RX ORDER — FENTANYL CITRATE 50 UG/ML
INJECTION, SOLUTION INTRAMUSCULAR; INTRAVENOUS AS NEEDED
Status: DISCONTINUED | OUTPATIENT
Start: 2017-02-26 | End: 2017-02-26 | Stop reason: SURG

## 2017-02-26 RX ORDER — ONDANSETRON 2 MG/ML
4 INJECTION INTRAMUSCULAR; INTRAVENOUS EVERY 6 HOURS PRN
Status: DISCONTINUED | OUTPATIENT
Start: 2017-02-26 | End: 2017-02-26 | Stop reason: HOSPADM

## 2017-02-26 RX ORDER — SODIUM CHLORIDE 9 MG/ML
125 INJECTION, SOLUTION INTRAVENOUS CONTINUOUS
Status: DISCONTINUED | OUTPATIENT
Start: 2017-02-26 | End: 2017-02-26

## 2017-02-26 RX ORDER — OXYCODONE HYDROCHLORIDE AND ACETAMINOPHEN 5; 325 MG/1; MG/1
1 TABLET ORAL EVERY 4 HOURS PRN
Status: DISCONTINUED | OUTPATIENT
Start: 2017-02-26 | End: 2017-03-02 | Stop reason: HOSPADM

## 2017-02-26 RX ORDER — FENTANYL CITRATE 50 UG/ML
50 INJECTION, SOLUTION INTRAMUSCULAR; INTRAVENOUS
Status: DISCONTINUED | OUTPATIENT
Start: 2017-02-26 | End: 2017-02-26 | Stop reason: HOSPADM

## 2017-02-26 RX ORDER — SODIUM CHLORIDE 9 MG/ML
INJECTION, SOLUTION INTRAVENOUS CONTINUOUS PRN
Status: DISCONTINUED | OUTPATIENT
Start: 2017-02-26 | End: 2017-02-26 | Stop reason: SURG

## 2017-02-26 RX ORDER — HEPARIN SODIUM 5000 [USP'U]/ML
5000 INJECTION, SOLUTION INTRAVENOUS; SUBCUTANEOUS EVERY 8 HOURS SCHEDULED
Status: DISCONTINUED | OUTPATIENT
Start: 2017-02-26 | End: 2017-02-26 | Stop reason: SDUPTHER

## 2017-02-26 RX ORDER — LIDOCAINE HYDROCHLORIDE 10 MG/ML
INJECTION, SOLUTION INFILTRATION; PERINEURAL AS NEEDED
Status: DISCONTINUED | OUTPATIENT
Start: 2017-02-26 | End: 2017-02-26 | Stop reason: SURG

## 2017-02-26 RX ORDER — PROPOFOL 10 MG/ML
INJECTION, EMULSION INTRAVENOUS AS NEEDED
Status: DISCONTINUED | OUTPATIENT
Start: 2017-02-26 | End: 2017-02-26 | Stop reason: SURG

## 2017-02-26 RX ADMIN — INSULIN LISPRO 2 UNITS: 100 INJECTION, SOLUTION INTRAVENOUS; SUBCUTANEOUS at 11:29

## 2017-02-26 RX ADMIN — INSULIN LISPRO 4 UNITS: 100 INJECTION, SOLUTION INTRAVENOUS; SUBCUTANEOUS at 16:53

## 2017-02-26 RX ADMIN — INSULIN LISPRO 15 UNITS: 100 INJECTION, SOLUTION INTRAVENOUS; SUBCUTANEOUS at 16:53

## 2017-02-26 RX ADMIN — CEFAZOLIN SODIUM 2000 MG: 2 SOLUTION INTRAVENOUS at 18:55

## 2017-02-26 RX ADMIN — INSULIN LISPRO 3 UNITS: 100 INJECTION, SOLUTION INTRAVENOUS; SUBCUTANEOUS at 21:27

## 2017-02-26 RX ADMIN — INSULIN GLARGINE 55 UNITS: 100 INJECTION, SOLUTION SUBCUTANEOUS at 21:27

## 2017-02-26 RX ADMIN — PROPOFOL 200 MG: 10 INJECTION, EMULSION INTRAVENOUS at 08:12

## 2017-02-26 RX ADMIN — LIDOCAINE HYDROCHLORIDE 3 ML: 10 INJECTION, SOLUTION INFILTRATION; PERINEURAL at 08:12

## 2017-02-26 RX ADMIN — METOPROLOL TARTRATE 2.5 MG: 5 INJECTION, SOLUTION INTRAVENOUS at 08:30

## 2017-02-26 RX ADMIN — BIMATOPROST 1 DROP: 0.1 SOLUTION/ DROPS OPHTHALMIC at 21:29

## 2017-02-26 RX ADMIN — FENTANYL CITRATE 25 MCG: 50 INJECTION, SOLUTION INTRAMUSCULAR; INTRAVENOUS at 08:39

## 2017-02-26 RX ADMIN — ONDANSETRON HYDROCHLORIDE 4 MG: 2 INJECTION, SOLUTION INTRAVENOUS at 08:20

## 2017-02-26 RX ADMIN — GABAPENTIN 600 MG: 300 CAPSULE ORAL at 21:28

## 2017-02-26 RX ADMIN — FENTANYL CITRATE 25 MCG: 50 INJECTION, SOLUTION INTRAMUSCULAR; INTRAVENOUS at 08:49

## 2017-02-26 RX ADMIN — HEPARIN SODIUM 5000 UNITS: 5000 INJECTION, SOLUTION INTRAVENOUS; SUBCUTANEOUS at 21:27

## 2017-02-26 RX ADMIN — OXYCODONE HYDROCHLORIDE AND ACETAMINOPHEN 1 TABLET: 5; 325 TABLET ORAL at 16:54

## 2017-02-26 RX ADMIN — FENTANYL CITRATE 25 MCG: 50 INJECTION, SOLUTION INTRAMUSCULAR; INTRAVENOUS at 08:20

## 2017-02-26 RX ADMIN — OXYCODONE HYDROCHLORIDE AND ACETAMINOPHEN 1 TABLET: 5; 325 TABLET ORAL at 21:27

## 2017-02-26 RX ADMIN — FENTANYL CITRATE 25 MCG: 50 INJECTION, SOLUTION INTRAMUSCULAR; INTRAVENOUS at 08:12

## 2017-02-26 RX ADMIN — HEPARIN SODIUM 5000 UNITS: 5000 INJECTION, SOLUTION INTRAVENOUS; SUBCUTANEOUS at 13:36

## 2017-02-26 RX ADMIN — INSULIN LISPRO 15 UNITS: 100 INJECTION, SOLUTION INTRAVENOUS; SUBCUTANEOUS at 11:29

## 2017-02-26 RX ADMIN — CEFAZOLIN SODIUM 2000 MG: 2 SOLUTION INTRAVENOUS at 03:38

## 2017-02-26 RX ADMIN — SODIUM CHLORIDE: 0.9 INJECTION, SOLUTION INTRAVENOUS at 08:00

## 2017-02-27 LAB
ANION GAP SERPL CALCULATED.3IONS-SCNC: 7 MMOL/L (ref 4–13)
BACTERIA BLD CULT: NORMAL
BACTERIA BLD CULT: NORMAL
BUN SERPL-MCNC: 19 MG/DL (ref 5–25)
CALCIUM SERPL-MCNC: 8.5 MG/DL (ref 8.3–10.1)
CHLORIDE SERPL-SCNC: 99 MMOL/L (ref 100–108)
CO2 SERPL-SCNC: 30 MMOL/L (ref 21–32)
CREAT SERPL-MCNC: 1.24 MG/DL (ref 0.6–1.3)
ERYTHROCYTE [DISTWIDTH] IN BLOOD BY AUTOMATED COUNT: 12.8 % (ref 11.6–15.1)
GFR SERPL CREATININE-BSD FRML MDRD: >60 ML/MIN/1.73SQ M
GLUCOSE SERPL-MCNC: 144 MG/DL (ref 65–140)
GLUCOSE SERPL-MCNC: 159 MG/DL (ref 65–140)
GLUCOSE SERPL-MCNC: 217 MG/DL (ref 65–140)
GLUCOSE SERPL-MCNC: 267 MG/DL (ref 65–140)
GLUCOSE SERPL-MCNC: 280 MG/DL (ref 65–140)
HCT VFR BLD AUTO: 36.7 % (ref 36.5–49.3)
HGB BLD-MCNC: 12.7 G/DL (ref 12–17)
MCH RBC QN AUTO: 31.9 PG (ref 26.8–34.3)
MCHC RBC AUTO-ENTMCNC: 34.6 G/DL (ref 31.4–37.4)
MCV RBC AUTO: 92 FL (ref 82–98)
PLATELET # BLD AUTO: 201 THOUSANDS/UL (ref 149–390)
PMV BLD AUTO: 10.9 FL (ref 8.9–12.7)
POTASSIUM SERPL-SCNC: 4.4 MMOL/L (ref 3.5–5.3)
RBC # BLD AUTO: 3.98 MILLION/UL (ref 3.88–5.62)
SODIUM SERPL-SCNC: 136 MMOL/L (ref 136–145)
WBC # BLD AUTO: 9.8 THOUSAND/UL (ref 4.31–10.16)

## 2017-02-27 PROCEDURE — 82948 REAGENT STRIP/BLOOD GLUCOSE: CPT

## 2017-02-27 PROCEDURE — G8978 MOBILITY CURRENT STATUS: HCPCS

## 2017-02-27 PROCEDURE — 80048 BASIC METABOLIC PNL TOTAL CA: CPT | Performed by: PODIATRIST

## 2017-02-27 PROCEDURE — G8979 MOBILITY GOAL STATUS: HCPCS

## 2017-02-27 PROCEDURE — 97162 PT EVAL MOD COMPLEX 30 MIN: CPT

## 2017-02-27 PROCEDURE — 85027 COMPLETE CBC AUTOMATED: CPT | Performed by: PODIATRIST

## 2017-02-27 RX ADMIN — INSULIN LISPRO 3 UNITS: 100 INJECTION, SOLUTION INTRAVENOUS; SUBCUTANEOUS at 17:54

## 2017-02-27 RX ADMIN — CEFAZOLIN SODIUM 2000 MG: 2 SOLUTION INTRAVENOUS at 02:12

## 2017-02-27 RX ADMIN — PANTOPRAZOLE SODIUM 40 MG: 40 TABLET, DELAYED RELEASE ORAL at 05:43

## 2017-02-27 RX ADMIN — OXYCODONE HYDROCHLORIDE AND ACETAMINOPHEN 1 TABLET: 5; 325 TABLET ORAL at 02:18

## 2017-02-27 RX ADMIN — HEPARIN SODIUM 5000 UNITS: 5000 INJECTION, SOLUTION INTRAVENOUS; SUBCUTANEOUS at 13:21

## 2017-02-27 RX ADMIN — GABAPENTIN 600 MG: 300 CAPSULE ORAL at 21:32

## 2017-02-27 RX ADMIN — CEFAZOLIN SODIUM 2000 MG: 2 SOLUTION INTRAVENOUS at 10:38

## 2017-02-27 RX ADMIN — DOCUSATE SODIUM 100 MG: 100 CAPSULE, LIQUID FILLED ORAL at 08:59

## 2017-02-27 RX ADMIN — INSULIN LISPRO 15 UNITS: 100 INJECTION, SOLUTION INTRAVENOUS; SUBCUTANEOUS at 13:22

## 2017-02-27 RX ADMIN — DOCUSATE SODIUM 100 MG: 100 CAPSULE, LIQUID FILLED ORAL at 17:53

## 2017-02-27 RX ADMIN — CEFAZOLIN SODIUM 2000 MG: 2 SOLUTION INTRAVENOUS at 17:53

## 2017-02-27 RX ADMIN — INSULIN LISPRO 15 UNITS: 100 INJECTION, SOLUTION INTRAVENOUS; SUBCUTANEOUS at 17:56

## 2017-02-27 RX ADMIN — INSULIN LISPRO 2 UNITS: 100 INJECTION, SOLUTION INTRAVENOUS; SUBCUTANEOUS at 21:32

## 2017-02-27 RX ADMIN — INSULIN LISPRO 15 UNITS: 100 INJECTION, SOLUTION INTRAVENOUS; SUBCUTANEOUS at 08:59

## 2017-02-27 RX ADMIN — ISOSORBIDE MONONITRATE 30 MG: 30 TABLET, EXTENDED RELEASE ORAL at 08:59

## 2017-02-27 RX ADMIN — METOPROLOL SUCCINATE 100 MG: 50 TABLET, EXTENDED RELEASE ORAL at 08:59

## 2017-02-27 RX ADMIN — HEPARIN SODIUM 5000 UNITS: 5000 INJECTION, SOLUTION INTRAVENOUS; SUBCUTANEOUS at 21:31

## 2017-02-27 RX ADMIN — HEPARIN SODIUM 5000 UNITS: 5000 INJECTION, SOLUTION INTRAVENOUS; SUBCUTANEOUS at 05:43

## 2017-02-27 RX ADMIN — INSULIN GLARGINE 55 UNITS: 100 INJECTION, SOLUTION SUBCUTANEOUS at 21:32

## 2017-02-27 RX ADMIN — BIMATOPROST 1 DROP: 0.1 SOLUTION/ DROPS OPHTHALMIC at 21:32

## 2017-02-27 RX ADMIN — INSULIN LISPRO 4 UNITS: 100 INJECTION, SOLUTION INTRAVENOUS; SUBCUTANEOUS at 13:22

## 2017-02-27 RX ADMIN — FUROSEMIDE 20 MG: 20 TABLET ORAL at 08:59

## 2017-02-27 RX ADMIN — ASPIRIN 81 MG 81 MG: 81 TABLET ORAL at 08:59

## 2017-02-28 LAB
BACTERIA SPEC ANAEROBE CULT: NORMAL
BACTERIA TISS AEROBE CULT: NORMAL
GLUCOSE SERPL-MCNC: 100 MG/DL (ref 65–140)
GLUCOSE SERPL-MCNC: 117 MG/DL (ref 65–140)
GLUCOSE SERPL-MCNC: 134 MG/DL (ref 65–140)
GLUCOSE SERPL-MCNC: 140 MG/DL (ref 65–140)
GRAM STN SPEC: NORMAL
GRAM STN SPEC: NORMAL

## 2017-02-28 PROCEDURE — 97116 GAIT TRAINING THERAPY: CPT

## 2017-02-28 PROCEDURE — G8987 SELF CARE CURRENT STATUS: HCPCS

## 2017-02-28 PROCEDURE — 97166 OT EVAL MOD COMPLEX 45 MIN: CPT

## 2017-02-28 PROCEDURE — 82948 REAGENT STRIP/BLOOD GLUCOSE: CPT

## 2017-02-28 PROCEDURE — G8988 SELF CARE GOAL STATUS: HCPCS

## 2017-02-28 PROCEDURE — G8989 SELF CARE D/C STATUS: HCPCS

## 2017-02-28 PROCEDURE — 97110 THERAPEUTIC EXERCISES: CPT

## 2017-02-28 RX ADMIN — INSULIN GLARGINE 55 UNITS: 100 INJECTION, SOLUTION SUBCUTANEOUS at 22:17

## 2017-02-28 RX ADMIN — INSULIN LISPRO 15 UNITS: 100 INJECTION, SOLUTION INTRAVENOUS; SUBCUTANEOUS at 08:28

## 2017-02-28 RX ADMIN — INSULIN LISPRO 15 UNITS: 100 INJECTION, SOLUTION INTRAVENOUS; SUBCUTANEOUS at 17:03

## 2017-02-28 RX ADMIN — HEPARIN SODIUM 5000 UNITS: 5000 INJECTION, SOLUTION INTRAVENOUS; SUBCUTANEOUS at 22:17

## 2017-02-28 RX ADMIN — POLYETHYLENE GLYCOL 3350 17 G: 17 POWDER, FOR SOLUTION ORAL at 08:29

## 2017-02-28 RX ADMIN — ISOSORBIDE MONONITRATE 30 MG: 30 TABLET, EXTENDED RELEASE ORAL at 08:28

## 2017-02-28 RX ADMIN — METOPROLOL SUCCINATE 100 MG: 50 TABLET, EXTENDED RELEASE ORAL at 08:28

## 2017-02-28 RX ADMIN — BIMATOPROST 1 DROP: 0.1 SOLUTION/ DROPS OPHTHALMIC at 22:17

## 2017-02-28 RX ADMIN — CEFAZOLIN SODIUM 2000 MG: 2 SOLUTION INTRAVENOUS at 01:33

## 2017-02-28 RX ADMIN — HEPARIN SODIUM 5000 UNITS: 5000 INJECTION, SOLUTION INTRAVENOUS; SUBCUTANEOUS at 14:22

## 2017-02-28 RX ADMIN — METFORMIN HYDROCHLORIDE 1000 MG: 500 TABLET, FILM COATED ORAL at 17:00

## 2017-02-28 RX ADMIN — INSULIN LISPRO 15 UNITS: 100 INJECTION, SOLUTION INTRAVENOUS; SUBCUTANEOUS at 11:56

## 2017-02-28 RX ADMIN — FUROSEMIDE 20 MG: 20 TABLET ORAL at 08:29

## 2017-02-28 RX ADMIN — HEPARIN SODIUM 5000 UNITS: 5000 INJECTION, SOLUTION INTRAVENOUS; SUBCUTANEOUS at 05:36

## 2017-02-28 RX ADMIN — GABAPENTIN 600 MG: 300 CAPSULE ORAL at 22:17

## 2017-02-28 RX ADMIN — CEFAZOLIN SODIUM 2000 MG: 2 SOLUTION INTRAVENOUS at 19:06

## 2017-02-28 RX ADMIN — ASPIRIN 81 MG 81 MG: 81 TABLET ORAL at 08:29

## 2017-02-28 RX ADMIN — DOCUSATE SODIUM 100 MG: 100 CAPSULE, LIQUID FILLED ORAL at 08:28

## 2017-02-28 RX ADMIN — CEFAZOLIN SODIUM 2000 MG: 2 SOLUTION INTRAVENOUS at 10:17

## 2017-02-28 RX ADMIN — PANTOPRAZOLE SODIUM 40 MG: 40 TABLET, DELAYED RELEASE ORAL at 05:36

## 2017-03-01 ENCOUNTER — APPOINTMENT (INPATIENT)
Dept: PHYSICAL THERAPY | Facility: HOSPITAL | Age: 72
DRG: 574 | End: 2017-03-01
Payer: MEDICARE

## 2017-03-01 LAB
ERYTHROCYTE [DISTWIDTH] IN BLOOD BY AUTOMATED COUNT: 12.7 % (ref 11.6–15.1)
GLUCOSE SERPL-MCNC: 106 MG/DL (ref 65–140)
GLUCOSE SERPL-MCNC: 215 MG/DL (ref 65–140)
GLUCOSE SERPL-MCNC: 241 MG/DL (ref 65–140)
GLUCOSE SERPL-MCNC: 51 MG/DL (ref 65–140)
GLUCOSE SERPL-MCNC: 88 MG/DL (ref 65–140)
HCT VFR BLD AUTO: 37.2 % (ref 36.5–49.3)
HGB BLD-MCNC: 12.4 G/DL (ref 12–17)
MCH RBC QN AUTO: 30.6 PG (ref 26.8–34.3)
MCHC RBC AUTO-ENTMCNC: 33.3 G/DL (ref 31.4–37.4)
MCV RBC AUTO: 92 FL (ref 82–98)
PLATELET # BLD AUTO: 265 THOUSANDS/UL (ref 149–390)
PMV BLD AUTO: 10 FL (ref 8.9–12.7)
RBC # BLD AUTO: 4.05 MILLION/UL (ref 3.88–5.62)
WBC # BLD AUTO: 8.48 THOUSAND/UL (ref 4.31–10.16)

## 2017-03-01 PROCEDURE — 97530 THERAPEUTIC ACTIVITIES: CPT

## 2017-03-01 PROCEDURE — 85027 COMPLETE CBC AUTOMATED: CPT | Performed by: PODIATRIST

## 2017-03-01 PROCEDURE — 97116 GAIT TRAINING THERAPY: CPT

## 2017-03-01 PROCEDURE — 82948 REAGENT STRIP/BLOOD GLUCOSE: CPT

## 2017-03-01 RX ADMIN — CEFAZOLIN SODIUM 2000 MG: 2 SOLUTION INTRAVENOUS at 19:39

## 2017-03-01 RX ADMIN — INSULIN LISPRO 2 UNITS: 100 INJECTION, SOLUTION INTRAVENOUS; SUBCUTANEOUS at 21:43

## 2017-03-01 RX ADMIN — METOPROLOL SUCCINATE 100 MG: 50 TABLET, EXTENDED RELEASE ORAL at 09:45

## 2017-03-01 RX ADMIN — PANTOPRAZOLE SODIUM 40 MG: 40 TABLET, DELAYED RELEASE ORAL at 05:21

## 2017-03-01 RX ADMIN — CEFAZOLIN SODIUM 2000 MG: 2 SOLUTION INTRAVENOUS at 02:56

## 2017-03-01 RX ADMIN — GABAPENTIN 600 MG: 300 CAPSULE ORAL at 21:43

## 2017-03-01 RX ADMIN — CEFAZOLIN SODIUM 2000 MG: 2 SOLUTION INTRAVENOUS at 11:11

## 2017-03-01 RX ADMIN — INSULIN LISPRO 15 UNITS: 100 INJECTION, SOLUTION INTRAVENOUS; SUBCUTANEOUS at 09:54

## 2017-03-01 RX ADMIN — ISOSORBIDE MONONITRATE 30 MG: 30 TABLET, EXTENDED RELEASE ORAL at 09:51

## 2017-03-01 RX ADMIN — HEPARIN SODIUM 5000 UNITS: 5000 INJECTION, SOLUTION INTRAVENOUS; SUBCUTANEOUS at 05:20

## 2017-03-01 RX ADMIN — INSULIN LISPRO 3 UNITS: 100 INJECTION, SOLUTION INTRAVENOUS; SUBCUTANEOUS at 11:32

## 2017-03-01 RX ADMIN — HEPARIN SODIUM 5000 UNITS: 5000 INJECTION, SOLUTION INTRAVENOUS; SUBCUTANEOUS at 21:43

## 2017-03-01 RX ADMIN — ASPIRIN 81 MG 81 MG: 81 TABLET ORAL at 09:46

## 2017-03-01 RX ADMIN — HEPARIN SODIUM 5000 UNITS: 5000 INJECTION, SOLUTION INTRAVENOUS; SUBCUTANEOUS at 13:38

## 2017-03-01 RX ADMIN — METFORMIN HYDROCHLORIDE 1000 MG: 500 TABLET, FILM COATED ORAL at 09:45

## 2017-03-01 RX ADMIN — INSULIN LISPRO 15 UNITS: 100 INJECTION, SOLUTION INTRAVENOUS; SUBCUTANEOUS at 11:31

## 2017-03-01 RX ADMIN — INSULIN GLARGINE 55 UNITS: 100 INJECTION, SOLUTION SUBCUTANEOUS at 21:44

## 2017-03-01 RX ADMIN — BIMATOPROST 1 DROP: 0.1 SOLUTION/ DROPS OPHTHALMIC at 21:43

## 2017-03-01 RX ADMIN — FUROSEMIDE 20 MG: 20 TABLET ORAL at 09:46

## 2017-03-01 RX ADMIN — FLUOCINONIDE: 0.5 CREAM TOPICAL at 09:51

## 2017-03-02 ENCOUNTER — APPOINTMENT (INPATIENT)
Dept: PHYSICAL THERAPY | Facility: HOSPITAL | Age: 72
DRG: 574 | End: 2017-03-02
Payer: MEDICARE

## 2017-03-02 VITALS
BODY MASS INDEX: 30.34 KG/M2 | HEART RATE: 81 BPM | OXYGEN SATURATION: 96 % | DIASTOLIC BLOOD PRESSURE: 62 MMHG | HEIGHT: 76 IN | WEIGHT: 249.12 LBS | SYSTOLIC BLOOD PRESSURE: 103 MMHG | RESPIRATION RATE: 18 BRPM | TEMPERATURE: 96.9 F

## 2017-03-02 LAB
GLUCOSE SERPL-MCNC: 125 MG/DL (ref 65–140)
GLUCOSE SERPL-MCNC: 156 MG/DL (ref 65–140)

## 2017-03-02 PROCEDURE — 97116 GAIT TRAINING THERAPY: CPT

## 2017-03-02 PROCEDURE — 82948 REAGENT STRIP/BLOOD GLUCOSE: CPT

## 2017-03-02 PROCEDURE — 97110 THERAPEUTIC EXERCISES: CPT

## 2017-03-02 PROCEDURE — 97530 THERAPEUTIC ACTIVITIES: CPT

## 2017-03-02 RX ORDER — CEPHALEXIN 500 MG/1
500 CAPSULE ORAL EVERY 6 HOURS SCHEDULED
Status: DISCONTINUED | OUTPATIENT
Start: 2017-03-02 | End: 2017-03-02 | Stop reason: HOSPADM

## 2017-03-02 RX ORDER — CEPHALEXIN 500 MG/1
500 CAPSULE ORAL EVERY 6 HOURS SCHEDULED
Qty: 24 CAPSULE | Refills: 0 | Status: SHIPPED | OUTPATIENT
Start: 2017-03-02 | End: 2017-03-08

## 2017-03-02 RX ORDER — OXYCODONE HYDROCHLORIDE AND ACETAMINOPHEN 5; 325 MG/1; MG/1
1 TABLET ORAL EVERY 4 HOURS PRN
Qty: 10 TABLET | Refills: 0 | Status: SHIPPED | OUTPATIENT
Start: 2017-03-02 | End: 2017-03-12

## 2017-03-02 RX ADMIN — CEPHALEXIN 500 MG: 500 CAPSULE ORAL at 08:54

## 2017-03-02 RX ADMIN — ASPIRIN 81 MG 81 MG: 81 TABLET ORAL at 08:54

## 2017-03-02 RX ADMIN — PANTOPRAZOLE SODIUM 40 MG: 40 TABLET, DELAYED RELEASE ORAL at 06:03

## 2017-03-02 RX ADMIN — POLYETHYLENE GLYCOL 3350 17 G: 17 POWDER, FOR SOLUTION ORAL at 08:55

## 2017-03-02 RX ADMIN — ISOSORBIDE MONONITRATE 30 MG: 30 TABLET, EXTENDED RELEASE ORAL at 09:03

## 2017-03-02 RX ADMIN — FUROSEMIDE 20 MG: 20 TABLET ORAL at 08:54

## 2017-03-02 RX ADMIN — CEFAZOLIN SODIUM 2000 MG: 2 SOLUTION INTRAVENOUS at 03:40

## 2017-03-02 RX ADMIN — DOCUSATE SODIUM 100 MG: 100 CAPSULE, LIQUID FILLED ORAL at 08:54

## 2017-03-02 RX ADMIN — METFORMIN HYDROCHLORIDE 1000 MG: 500 TABLET, FILM COATED ORAL at 08:54

## 2017-03-02 RX ADMIN — HEPARIN SODIUM 5000 UNITS: 5000 INJECTION, SOLUTION INTRAVENOUS; SUBCUTANEOUS at 06:03

## 2017-03-02 RX ADMIN — METOPROLOL SUCCINATE 100 MG: 50 TABLET, EXTENDED RELEASE ORAL at 08:54

## 2017-03-02 RX ADMIN — INSULIN LISPRO 15 UNITS: 100 INJECTION, SOLUTION INTRAVENOUS; SUBCUTANEOUS at 08:55

## 2017-03-09 ENCOUNTER — ALLSCRIPTS OFFICE VISIT (OUTPATIENT)
Dept: OTHER | Facility: OTHER | Age: 72
End: 2017-03-09

## 2017-03-09 ENCOUNTER — ALLSCRIPTS OFFICE VISIT (OUTPATIENT)
Dept: WOUND CARE | Facility: HOSPITAL | Age: 72
End: 2017-03-09
Payer: MEDICARE

## 2017-03-09 DIAGNOSIS — E11.65 TYPE 2 DIABETES MELLITUS WITH HYPERGLYCEMIA (HCC): ICD-10-CM

## 2017-03-09 DIAGNOSIS — T81.89XD OTHER COMPLICATIONS OF PROCEDURES, NOT ELSEWHERE CLASSIFIED, SUBSEQUENT ENCOUNTER: ICD-10-CM

## 2017-03-09 PROCEDURE — 11042 DBRDMT SUBQ TIS 1ST 20SQCM/<: CPT | Performed by: PODIATRIST

## 2017-03-09 PROCEDURE — 97605 NEG PRS WND THER DME<=50SQCM: CPT | Performed by: PODIATRIST

## 2017-03-09 PROCEDURE — 99213 OFFICE O/P EST LOW 20 MIN: CPT | Performed by: PODIATRIST

## 2017-03-16 ENCOUNTER — ALLSCRIPTS OFFICE VISIT (OUTPATIENT)
Dept: WOUND CARE | Facility: HOSPITAL | Age: 72
End: 2017-03-16
Payer: MEDICARE

## 2017-03-16 PROCEDURE — 11043 DBRDMT MUSC&/FSCA 1ST 20/<: CPT | Performed by: PODIATRIST

## 2017-03-23 ENCOUNTER — ALLSCRIPTS OFFICE VISIT (OUTPATIENT)
Dept: WOUND CARE | Facility: HOSPITAL | Age: 72
End: 2017-03-23
Payer: MEDICARE

## 2017-03-23 PROCEDURE — 11043 DBRDMT MUSC&/FSCA 1ST 20/<: CPT | Performed by: PODIATRIST

## 2017-03-30 ENCOUNTER — ALLSCRIPTS OFFICE VISIT (OUTPATIENT)
Dept: WOUND CARE | Facility: HOSPITAL | Age: 72
End: 2017-03-30
Payer: MEDICARE

## 2017-03-30 PROCEDURE — 11042 DBRDMT SUBQ TIS 1ST 20SQCM/<: CPT | Performed by: PODIATRIST

## 2017-04-06 ENCOUNTER — ALLSCRIPTS OFFICE VISIT (OUTPATIENT)
Dept: WOUND CARE | Facility: HOSPITAL | Age: 72
End: 2017-04-06
Payer: MEDICARE

## 2017-04-06 PROCEDURE — 11043 DBRDMT MUSC&/FSCA 1ST 20/<: CPT | Performed by: PODIATRIST

## 2017-04-07 ENCOUNTER — HOSPITAL ENCOUNTER (OUTPATIENT)
Dept: NON INVASIVE DIAGNOSTICS | Facility: CLINIC | Age: 72
Discharge: HOME | End: 2017-04-07
Payer: MEDICARE

## 2017-04-07 DIAGNOSIS — T81.89XD OTHER COMPLICATIONS OF PROCEDURES, NOT ELSEWHERE CLASSIFIED, SUBSEQUENT ENCOUNTER: ICD-10-CM

## 2017-04-07 PROCEDURE — 93925 LOWER EXTREMITY STUDY: CPT

## 2017-04-07 PROCEDURE — 93923 UPR/LXTR ART STDY 3+ LVLS: CPT

## 2017-04-13 ENCOUNTER — ALLSCRIPTS OFFICE VISIT (OUTPATIENT)
Dept: WOUND CARE | Facility: HOSPITAL | Age: 72
End: 2017-04-13
Payer: MEDICARE

## 2017-04-13 PROCEDURE — 11042 DBRDMT SUBQ TIS 1ST 20SQCM/<: CPT | Performed by: PODIATRIST

## 2017-04-20 ENCOUNTER — GENERIC CONVERSION - ENCOUNTER (OUTPATIENT)
Dept: OTHER | Facility: OTHER | Age: 72
End: 2017-04-20

## 2017-04-20 ENCOUNTER — ALLSCRIPTS OFFICE VISIT (OUTPATIENT)
Dept: WOUND CARE | Facility: HOSPITAL | Age: 72
DRG: 854 | End: 2017-04-20
Payer: MEDICARE

## 2017-04-20 PROCEDURE — 11042 DBRDMT SUBQ TIS 1ST 20SQCM/<: CPT | Performed by: PODIATRIST

## 2017-04-21 ENCOUNTER — HOSPITAL ENCOUNTER (INPATIENT)
Facility: HOSPITAL | Age: 72
LOS: 7 days | Discharge: HOME HEALTH | DRG: 854 | End: 2017-04-28
Attending: EMERGENCY MEDICINE | Admitting: PODIATRIST
Payer: MEDICARE

## 2017-04-21 ENCOUNTER — APPOINTMENT (EMERGENCY)
Dept: RADIOLOGY | Facility: HOSPITAL | Age: 72
DRG: 854 | End: 2017-04-21
Payer: MEDICARE

## 2017-04-21 ENCOUNTER — GENERIC CONVERSION - ENCOUNTER (OUTPATIENT)
Dept: OTHER | Facility: OTHER | Age: 72
End: 2017-04-21

## 2017-04-21 ENCOUNTER — APPOINTMENT (EMERGENCY)
Dept: ULTRASOUND IMAGING | Facility: HOSPITAL | Age: 72
DRG: 854 | End: 2017-04-21
Payer: MEDICARE

## 2017-04-21 DIAGNOSIS — A40.9 STREPTOCOCCAL SEPSIS (HCC): ICD-10-CM

## 2017-04-21 DIAGNOSIS — M79.89 FOOT SWELLING: ICD-10-CM

## 2017-04-21 DIAGNOSIS — L03.119 CELLULITIS AND ABSCESS OF FOOT: ICD-10-CM

## 2017-04-21 DIAGNOSIS — D72.829 LEUKOCYTOSIS: ICD-10-CM

## 2017-04-21 DIAGNOSIS — L02.619 CELLULITIS AND ABSCESS OF FOOT: ICD-10-CM

## 2017-04-21 DIAGNOSIS — L03.115 CELLULITIS OF RIGHT LOWER EXTREMITY: Primary | ICD-10-CM

## 2017-04-21 PROBLEM — R11.0 NAUSEA: Status: ACTIVE | Noted: 2017-04-21

## 2017-04-21 PROBLEM — M79.671 RIGHT FOOT PAIN: Status: ACTIVE | Noted: 2017-04-21

## 2017-04-21 LAB
ALBUMIN SERPL BCP-MCNC: 3.8 G/DL (ref 3.5–5)
ALP SERPL-CCNC: 102 U/L (ref 46–116)
ALT SERPL W P-5'-P-CCNC: 97 U/L (ref 12–78)
ANION GAP SERPL CALCULATED.3IONS-SCNC: 11 MMOL/L (ref 4–13)
APTT PPP: 29 SECONDS (ref 24–36)
AST SERPL W P-5'-P-CCNC: 88 U/L (ref 5–45)
ATRIAL RATE: 95 BPM
BASOPHILS # BLD AUTO: 0.01 THOUSANDS/ΜL (ref 0–0.1)
BASOPHILS NFR BLD AUTO: 0 % (ref 0–1)
BILIRUB SERPL-MCNC: 0.71 MG/DL (ref 0.2–1)
BUN SERPL-MCNC: 24 MG/DL (ref 5–25)
CALCIUM SERPL-MCNC: 9.1 MG/DL (ref 8.3–10.1)
CHLORIDE SERPL-SCNC: 103 MMOL/L (ref 100–108)
CO2 SERPL-SCNC: 28 MMOL/L (ref 21–32)
CREAT SERPL-MCNC: 1.59 MG/DL (ref 0.6–1.3)
EOSINOPHIL # BLD AUTO: 0.01 THOUSAND/ΜL (ref 0–0.61)
EOSINOPHIL NFR BLD AUTO: 0 % (ref 0–6)
ERYTHROCYTE [DISTWIDTH] IN BLOOD BY AUTOMATED COUNT: 13.3 % (ref 11.6–15.1)
GFR SERPL CREATININE-BSD FRML MDRD: 51.8 ML/MIN/1.73SQ M
GLUCOSE SERPL-MCNC: 130 MG/DL (ref 65–140)
GLUCOSE SERPL-MCNC: 158 MG/DL (ref 65–140)
GLUCOSE SERPL-MCNC: 174 MG/DL (ref 65–140)
HCT VFR BLD AUTO: 38.8 % (ref 36.5–49.3)
HGB BLD-MCNC: 13.4 G/DL (ref 12–17)
INR PPP: 1.07 (ref 0.86–1.16)
LACTATE SERPL-SCNC: 2 MMOL/L (ref 0.5–2)
LYMPHOCYTES # BLD AUTO: 1.05 THOUSANDS/ΜL (ref 0.6–4.47)
LYMPHOCYTES NFR BLD AUTO: 7 % (ref 14–44)
MCH RBC QN AUTO: 31.1 PG (ref 26.8–34.3)
MCHC RBC AUTO-ENTMCNC: 34.5 G/DL (ref 31.4–37.4)
MCV RBC AUTO: 90 FL (ref 82–98)
MONOCYTES # BLD AUTO: 0.99 THOUSAND/ΜL (ref 0.17–1.22)
MONOCYTES NFR BLD AUTO: 7 % (ref 4–12)
NEUTROPHILS # BLD AUTO: 12.99 THOUSANDS/ΜL (ref 1.85–7.62)
NEUTS SEG NFR BLD AUTO: 86 % (ref 43–75)
NRBC BLD AUTO-RTO: 0 /100 WBCS
P AXIS: 52 DEGREES
PLATELET # BLD AUTO: 169 THOUSANDS/UL (ref 149–390)
PMV BLD AUTO: 11.3 FL (ref 8.9–12.7)
POTASSIUM SERPL-SCNC: 4.1 MMOL/L (ref 3.5–5.3)
PR INTERVAL: 184 MS
PREALB SERPL-MCNC: 29.8 MG/DL (ref 18–40)
PROT SERPL-MCNC: 7.5 G/DL (ref 6.4–8.2)
PROTHROMBIN TIME: 13.9 SECONDS (ref 12–14.3)
QRS AXIS: -8 DEGREES
QRSD INTERVAL: 98 MS
QT INTERVAL: 328 MS
QTC INTERVAL: 401 MS
RBC # BLD AUTO: 4.31 MILLION/UL (ref 3.88–5.62)
SODIUM SERPL-SCNC: 142 MMOL/L (ref 136–145)
SPECIMEN SOURCE: NORMAL
T WAVE AXIS: 51 DEGREES
TROPONIN I BLD-MCNC: 0 NG/ML (ref 0–0.08)
VENTRICULAR RATE: 90 BPM
WBC # BLD AUTO: 15.05 THOUSAND/UL (ref 4.31–10.16)

## 2017-04-21 PROCEDURE — 80053 COMPREHEN METABOLIC PANEL: CPT | Performed by: EMERGENCY MEDICINE

## 2017-04-21 PROCEDURE — 93005 ELECTROCARDIOGRAM TRACING: CPT | Performed by: EMERGENCY MEDICINE

## 2017-04-21 PROCEDURE — 85610 PROTHROMBIN TIME: CPT | Performed by: EMERGENCY MEDICINE

## 2017-04-21 PROCEDURE — 87070 CULTURE OTHR SPECIMN AEROBIC: CPT | Performed by: PODIATRIST

## 2017-04-21 PROCEDURE — 73630 X-RAY EXAM OF FOOT: CPT

## 2017-04-21 PROCEDURE — 96360 HYDRATION IV INFUSION INIT: CPT

## 2017-04-21 PROCEDURE — 85730 THROMBOPLASTIN TIME PARTIAL: CPT | Performed by: EMERGENCY MEDICINE

## 2017-04-21 PROCEDURE — 85025 COMPLETE CBC W/AUTO DIFF WBC: CPT | Performed by: EMERGENCY MEDICINE

## 2017-04-21 PROCEDURE — 82948 REAGENT STRIP/BLOOD GLUCOSE: CPT

## 2017-04-21 PROCEDURE — 83605 ASSAY OF LACTIC ACID: CPT | Performed by: EMERGENCY MEDICINE

## 2017-04-21 PROCEDURE — 84484 ASSAY OF TROPONIN QUANT: CPT

## 2017-04-21 PROCEDURE — 99285 EMERGENCY DEPT VISIT HI MDM: CPT

## 2017-04-21 PROCEDURE — 84134 ASSAY OF PREALBUMIN: CPT | Performed by: PODIATRIST

## 2017-04-21 PROCEDURE — 87147 CULTURE TYPE IMMUNOLOGIC: CPT | Performed by: PODIATRIST

## 2017-04-21 PROCEDURE — 36415 COLL VENOUS BLD VENIPUNCTURE: CPT | Performed by: EMERGENCY MEDICINE

## 2017-04-21 PROCEDURE — 87205 SMEAR GRAM STAIN: CPT | Performed by: PODIATRIST

## 2017-04-21 PROCEDURE — 87040 BLOOD CULTURE FOR BACTERIA: CPT | Performed by: PODIATRIST

## 2017-04-21 PROCEDURE — 87186 SC STD MICRODIL/AGAR DIL: CPT | Performed by: PODIATRIST

## 2017-04-21 PROCEDURE — 87181 SC STD AGAR DILUTION PER AGT: CPT | Performed by: PODIATRIST

## 2017-04-21 RX ORDER — ISOSORBIDE MONONITRATE 20 MG/1
30 TABLET ORAL DAILY
Status: DISCONTINUED | OUTPATIENT
Start: 2017-04-22 | End: 2017-04-21 | Stop reason: CLARIF

## 2017-04-21 RX ORDER — FLUTICASONE PROPIONATE 50 MCG
2 SPRAY, SUSPENSION (ML) NASAL DAILY
Status: DISCONTINUED | OUTPATIENT
Start: 2017-04-22 | End: 2017-04-28 | Stop reason: HOSPADM

## 2017-04-21 RX ORDER — METRONIDAZOLE 500 MG/1
500 TABLET ORAL EVERY 8 HOURS SCHEDULED
Status: DISCONTINUED | OUTPATIENT
Start: 2017-04-21 | End: 2017-04-27

## 2017-04-21 RX ORDER — FUROSEMIDE 20 MG/1
20 TABLET ORAL 2 TIMES DAILY
Status: DISCONTINUED | OUTPATIENT
Start: 2017-04-21 | End: 2017-04-28 | Stop reason: HOSPADM

## 2017-04-21 RX ORDER — GABAPENTIN 300 MG/1
600 CAPSULE ORAL
Status: DISCONTINUED | OUTPATIENT
Start: 2017-04-21 | End: 2017-04-28 | Stop reason: HOSPADM

## 2017-04-21 RX ORDER — INSULIN GLARGINE 100 [IU]/ML
55 INJECTION, SOLUTION SUBCUTANEOUS
Status: DISCONTINUED | OUTPATIENT
Start: 2017-04-21 | End: 2017-04-28 | Stop reason: HOSPADM

## 2017-04-21 RX ORDER — HEPARIN SODIUM 5000 [USP'U]/ML
5000 INJECTION, SOLUTION INTRAVENOUS; SUBCUTANEOUS EVERY 8 HOURS SCHEDULED
Status: DISCONTINUED | OUTPATIENT
Start: 2017-04-21 | End: 2017-04-25 | Stop reason: SDUPTHER

## 2017-04-21 RX ORDER — ACETAMINOPHEN 325 MG/1
650 TABLET ORAL EVERY 6 HOURS PRN
Status: DISCONTINUED | OUTPATIENT
Start: 2017-04-21 | End: 2017-04-28 | Stop reason: HOSPADM

## 2017-04-21 RX ORDER — ISOSORBIDE MONONITRATE 30 MG/1
30 TABLET, EXTENDED RELEASE ORAL DAILY
Status: DISCONTINUED | OUTPATIENT
Start: 2017-04-22 | End: 2017-04-28 | Stop reason: HOSPADM

## 2017-04-21 RX ORDER — ONDANSETRON 2 MG/ML
4 INJECTION INTRAMUSCULAR; INTRAVENOUS ONCE AS NEEDED
Status: COMPLETED | OUTPATIENT
Start: 2017-04-21 | End: 2017-04-27

## 2017-04-21 RX ORDER — SODIUM CHLORIDE 9 MG/ML
125 INJECTION, SOLUTION INTRAVENOUS CONTINUOUS
Status: DISCONTINUED | OUTPATIENT
Start: 2017-04-21 | End: 2017-04-26

## 2017-04-21 RX ORDER — ASPIRIN 81 MG/1
81 TABLET, CHEWABLE ORAL DAILY
Status: DISCONTINUED | OUTPATIENT
Start: 2017-04-22 | End: 2017-04-28 | Stop reason: HOSPADM

## 2017-04-21 RX ORDER — AZELASTINE 1 MG/ML
1 SPRAY, METERED NASAL 2 TIMES DAILY
Status: DISCONTINUED | OUTPATIENT
Start: 2017-04-21 | End: 2017-04-28 | Stop reason: HOSPADM

## 2017-04-21 RX ORDER — PANTOPRAZOLE SODIUM 20 MG/1
20 TABLET, DELAYED RELEASE ORAL
Status: DISCONTINUED | OUTPATIENT
Start: 2017-04-22 | End: 2017-04-28 | Stop reason: HOSPADM

## 2017-04-21 RX ORDER — METOPROLOL SUCCINATE 50 MG/1
100 TABLET, EXTENDED RELEASE ORAL DAILY
Status: DISCONTINUED | OUTPATIENT
Start: 2017-04-22 | End: 2017-04-28 | Stop reason: HOSPADM

## 2017-04-21 RX ADMIN — HEPARIN SODIUM 5000 UNITS: 5000 INJECTION, SOLUTION INTRAVENOUS; SUBCUTANEOUS at 22:00

## 2017-04-21 RX ADMIN — METRONIDAZOLE 500 MG: 500 TABLET ORAL at 21:59

## 2017-04-21 RX ADMIN — GABAPENTIN 600 MG: 300 CAPSULE ORAL at 21:59

## 2017-04-21 RX ADMIN — HEPARIN SODIUM 5000 UNITS: 5000 INJECTION, SOLUTION INTRAVENOUS; SUBCUTANEOUS at 18:11

## 2017-04-21 RX ADMIN — SODIUM CHLORIDE 125 ML/HR: 0.9 INJECTION, SOLUTION INTRAVENOUS at 18:44

## 2017-04-21 RX ADMIN — CEFAZOLIN SODIUM 2000 MG: 10 INJECTION, POWDER, FOR SOLUTION INTRAVENOUS at 18:08

## 2017-04-21 RX ADMIN — METRONIDAZOLE 500 MG: 500 TABLET ORAL at 17:35

## 2017-04-21 RX ADMIN — ACETAMINOPHEN 650 MG: 325 TABLET ORAL at 23:52

## 2017-04-21 RX ADMIN — FUROSEMIDE 20 MG: 20 TABLET ORAL at 19:56

## 2017-04-21 RX ADMIN — SODIUM CHLORIDE 1000 ML: 0.9 INJECTION, SOLUTION INTRAVENOUS at 16:17

## 2017-04-21 RX ADMIN — BIMATOPROST 1 DROP: 0.1 SOLUTION/ DROPS OPHTHALMIC at 21:59

## 2017-04-21 RX ADMIN — INSULIN GLARGINE 55 UNITS: 100 INJECTION, SOLUTION SUBCUTANEOUS at 21:59

## 2017-04-22 ENCOUNTER — APPOINTMENT (INPATIENT)
Dept: NON INVASIVE DIAGNOSTICS | Facility: HOSPITAL | Age: 72
DRG: 854 | End: 2017-04-22
Payer: MEDICARE

## 2017-04-22 LAB
ANION GAP SERPL CALCULATED.3IONS-SCNC: 9 MMOL/L (ref 4–13)
BUN SERPL-MCNC: 24 MG/DL (ref 5–25)
CALCIUM SERPL-MCNC: 7.9 MG/DL (ref 8.3–10.1)
CHLORIDE SERPL-SCNC: 106 MMOL/L (ref 100–108)
CO2 SERPL-SCNC: 27 MMOL/L (ref 21–32)
CREAT SERPL-MCNC: 1.53 MG/DL (ref 0.6–1.3)
ERYTHROCYTE [DISTWIDTH] IN BLOOD BY AUTOMATED COUNT: 13.5 % (ref 11.6–15.1)
EST. AVERAGE GLUCOSE BLD GHB EST-MCNC: 200 MG/DL
GFR SERPL CREATININE-BSD FRML MDRD: 54.2 ML/MIN/1.73SQ M
GLUCOSE SERPL-MCNC: 122 MG/DL (ref 65–140)
GLUCOSE SERPL-MCNC: 131 MG/DL (ref 65–140)
GLUCOSE SERPL-MCNC: 240 MG/DL (ref 65–140)
GLUCOSE SERPL-MCNC: 83 MG/DL (ref 65–140)
GLUCOSE SERPL-MCNC: 95 MG/DL (ref 65–140)
HBA1C MFR BLD: 8.6 % (ref 4.2–6.3)
HCT VFR BLD AUTO: 32.4 % (ref 36.5–49.3)
HGB BLD-MCNC: 11.2 G/DL (ref 12–17)
MCH RBC QN AUTO: 30.9 PG (ref 26.8–34.3)
MCHC RBC AUTO-ENTMCNC: 34.6 G/DL (ref 31.4–37.4)
MCV RBC AUTO: 90 FL (ref 82–98)
PLATELET # BLD AUTO: 145 THOUSANDS/UL (ref 149–390)
PMV BLD AUTO: 10.8 FL (ref 8.9–12.7)
POTASSIUM SERPL-SCNC: 3.7 MMOL/L (ref 3.5–5.3)
RBC # BLD AUTO: 3.62 MILLION/UL (ref 3.88–5.62)
SODIUM SERPL-SCNC: 142 MMOL/L (ref 136–145)
WBC # BLD AUTO: 14.66 THOUSAND/UL (ref 4.31–10.16)

## 2017-04-22 PROCEDURE — 82948 REAGENT STRIP/BLOOD GLUCOSE: CPT

## 2017-04-22 PROCEDURE — 83036 HEMOGLOBIN GLYCOSYLATED A1C: CPT | Performed by: PODIATRIST

## 2017-04-22 PROCEDURE — 93971 EXTREMITY STUDY: CPT

## 2017-04-22 PROCEDURE — 80048 BASIC METABOLIC PNL TOTAL CA: CPT | Performed by: PODIATRIST

## 2017-04-22 PROCEDURE — 85027 COMPLETE CBC AUTOMATED: CPT | Performed by: PODIATRIST

## 2017-04-22 RX ORDER — INSULIN GLARGINE 100 [IU]/ML
27 INJECTION, SOLUTION SUBCUTANEOUS ONCE
Status: COMPLETED | OUTPATIENT
Start: 2017-04-22 | End: 2017-04-22

## 2017-04-22 RX ADMIN — FUROSEMIDE 20 MG: 20 TABLET ORAL at 10:00

## 2017-04-22 RX ADMIN — METRONIDAZOLE 500 MG: 500 TABLET ORAL at 13:12

## 2017-04-22 RX ADMIN — HEPARIN SODIUM 5000 UNITS: 5000 INJECTION, SOLUTION INTRAVENOUS; SUBCUTANEOUS at 22:07

## 2017-04-22 RX ADMIN — SODIUM CHLORIDE 125 ML/HR: 0.9 INJECTION, SOLUTION INTRAVENOUS at 02:47

## 2017-04-22 RX ADMIN — CEFAZOLIN SODIUM 2000 MG: 10 INJECTION, POWDER, FOR SOLUTION INTRAVENOUS at 09:45

## 2017-04-22 RX ADMIN — ACETAMINOPHEN 650 MG: 325 TABLET ORAL at 18:42

## 2017-04-22 RX ADMIN — HEPARIN SODIUM 5000 UNITS: 5000 INJECTION, SOLUTION INTRAVENOUS; SUBCUTANEOUS at 13:12

## 2017-04-22 RX ADMIN — VANCOMYCIN HYDROCHLORIDE 1750 MG: 5 INJECTION, POWDER, LYOPHILIZED, FOR SOLUTION INTRAVENOUS at 22:07

## 2017-04-22 RX ADMIN — ASPIRIN 81 MG: 81 TABLET, CHEWABLE ORAL at 10:00

## 2017-04-22 RX ADMIN — INSULIN LISPRO 15 UNITS: 100 INJECTION, SOLUTION INTRAVENOUS; SUBCUTANEOUS at 08:30

## 2017-04-22 RX ADMIN — CEFAZOLIN SODIUM 2000 MG: 10 INJECTION, POWDER, FOR SOLUTION INTRAVENOUS at 17:18

## 2017-04-22 RX ADMIN — INSULIN GLARGINE 27 UNITS: 100 INJECTION, SOLUTION SUBCUTANEOUS at 22:07

## 2017-04-22 RX ADMIN — BIMATOPROST 1 DROP: 0.1 SOLUTION/ DROPS OPHTHALMIC at 22:06

## 2017-04-22 RX ADMIN — METOPROLOL SUCCINATE 100 MG: 50 TABLET, EXTENDED RELEASE ORAL at 10:00

## 2017-04-22 RX ADMIN — SODIUM CHLORIDE 125 ML/HR: 0.9 INJECTION, SOLUTION INTRAVENOUS at 14:24

## 2017-04-22 RX ADMIN — INSULIN LISPRO 15 UNITS: 100 INJECTION, SOLUTION INTRAVENOUS; SUBCUTANEOUS at 11:31

## 2017-04-22 RX ADMIN — METRONIDAZOLE 500 MG: 500 TABLET ORAL at 22:06

## 2017-04-22 RX ADMIN — METRONIDAZOLE 500 MG: 500 TABLET ORAL at 05:40

## 2017-04-22 RX ADMIN — FUROSEMIDE 20 MG: 20 TABLET ORAL at 17:18

## 2017-04-22 RX ADMIN — VANCOMYCIN HYDROCHLORIDE 1750 MG: 5 INJECTION, POWDER, LYOPHILIZED, FOR SOLUTION INTRAVENOUS at 11:00

## 2017-04-22 RX ADMIN — CEFAZOLIN SODIUM 2000 MG: 10 INJECTION, POWDER, FOR SOLUTION INTRAVENOUS at 01:22

## 2017-04-22 RX ADMIN — INSULIN LISPRO 15 UNITS: 100 INJECTION, SOLUTION INTRAVENOUS; SUBCUTANEOUS at 17:18

## 2017-04-22 RX ADMIN — Medication 1 TABLET: at 10:00

## 2017-04-22 RX ADMIN — PANTOPRAZOLE SODIUM 20 MG: 20 TABLET, DELAYED RELEASE ORAL at 05:48

## 2017-04-22 RX ADMIN — HEPARIN SODIUM 5000 UNITS: 5000 INJECTION, SOLUTION INTRAVENOUS; SUBCUTANEOUS at 05:39

## 2017-04-22 RX ADMIN — GABAPENTIN 600 MG: 300 CAPSULE ORAL at 22:06

## 2017-04-22 RX ADMIN — SODIUM CHLORIDE 125 ML/HR: 0.9 INJECTION, SOLUTION INTRAVENOUS at 22:14

## 2017-04-22 RX ADMIN — ISOSORBIDE MONONITRATE 30 MG: 30 TABLET, EXTENDED RELEASE ORAL at 10:00

## 2017-04-23 LAB
ANION GAP SERPL CALCULATED.3IONS-SCNC: 8 MMOL/L (ref 4–13)
BUN SERPL-MCNC: 19 MG/DL (ref 5–25)
CALCIUM SERPL-MCNC: 8.1 MG/DL (ref 8.3–10.1)
CHLORIDE SERPL-SCNC: 103 MMOL/L (ref 100–108)
CO2 SERPL-SCNC: 27 MMOL/L (ref 21–32)
CREAT SERPL-MCNC: 1.4 MG/DL (ref 0.6–1.3)
ERYTHROCYTE [DISTWIDTH] IN BLOOD BY AUTOMATED COUNT: 13.5 % (ref 11.6–15.1)
GFR SERPL CREATININE-BSD FRML MDRD: 60 ML/MIN/1.73SQ M
GLUCOSE SERPL-MCNC: 114 MG/DL (ref 65–140)
GLUCOSE SERPL-MCNC: 125 MG/DL (ref 65–140)
GLUCOSE SERPL-MCNC: 206 MG/DL (ref 65–140)
GLUCOSE SERPL-MCNC: 78 MG/DL (ref 65–140)
GLUCOSE SERPL-MCNC: 86 MG/DL (ref 65–140)
HCT VFR BLD AUTO: 33.9 % (ref 36.5–49.3)
HGB BLD-MCNC: 11.9 G/DL (ref 12–17)
MCH RBC QN AUTO: 31.6 PG (ref 26.8–34.3)
MCHC RBC AUTO-ENTMCNC: 35.1 G/DL (ref 31.4–37.4)
MCV RBC AUTO: 90 FL (ref 82–98)
PLATELET # BLD AUTO: 147 THOUSANDS/UL (ref 149–390)
PMV BLD AUTO: 10.6 FL (ref 8.9–12.7)
POTASSIUM SERPL-SCNC: 3.6 MMOL/L (ref 3.5–5.3)
RBC # BLD AUTO: 3.77 MILLION/UL (ref 3.88–5.62)
SODIUM SERPL-SCNC: 138 MMOL/L (ref 136–145)
VANCOMYCIN TROUGH SERPL-MCNC: 19.2 UG/ML (ref 10–20)
WBC # BLD AUTO: 12.36 THOUSAND/UL (ref 4.31–10.16)

## 2017-04-23 PROCEDURE — 80048 BASIC METABOLIC PNL TOTAL CA: CPT | Performed by: PODIATRIST

## 2017-04-23 PROCEDURE — 80202 ASSAY OF VANCOMYCIN: CPT | Performed by: INTERNAL MEDICINE

## 2017-04-23 PROCEDURE — 85027 COMPLETE CBC AUTOMATED: CPT | Performed by: PODIATRIST

## 2017-04-23 PROCEDURE — 82948 REAGENT STRIP/BLOOD GLUCOSE: CPT

## 2017-04-23 RX ADMIN — METRONIDAZOLE 500 MG: 500 TABLET ORAL at 05:44

## 2017-04-23 RX ADMIN — CEFAZOLIN SODIUM 2000 MG: 10 INJECTION, POWDER, FOR SOLUTION INTRAVENOUS at 01:11

## 2017-04-23 RX ADMIN — INSULIN LISPRO 15 UNITS: 100 INJECTION, SOLUTION INTRAVENOUS; SUBCUTANEOUS at 16:41

## 2017-04-23 RX ADMIN — HEPARIN SODIUM 5000 UNITS: 5000 INJECTION, SOLUTION INTRAVENOUS; SUBCUTANEOUS at 05:44

## 2017-04-23 RX ADMIN — CEFAZOLIN SODIUM 2000 MG: 10 INJECTION, POWDER, FOR SOLUTION INTRAVENOUS at 16:40

## 2017-04-23 RX ADMIN — ASPIRIN 81 MG: 81 TABLET, CHEWABLE ORAL at 08:53

## 2017-04-23 RX ADMIN — PANTOPRAZOLE SODIUM 20 MG: 20 TABLET, DELAYED RELEASE ORAL at 05:44

## 2017-04-23 RX ADMIN — SODIUM CHLORIDE 125 ML/HR: 0.9 INJECTION, SOLUTION INTRAVENOUS at 08:59

## 2017-04-23 RX ADMIN — FUROSEMIDE 20 MG: 20 TABLET ORAL at 08:53

## 2017-04-23 RX ADMIN — GABAPENTIN 600 MG: 300 CAPSULE ORAL at 22:00

## 2017-04-23 RX ADMIN — METRONIDAZOLE 500 MG: 500 TABLET ORAL at 14:11

## 2017-04-23 RX ADMIN — BIMATOPROST 1 DROP: 0.1 SOLUTION/ DROPS OPHTHALMIC at 21:59

## 2017-04-23 RX ADMIN — VANCOMYCIN HYDROCHLORIDE 1750 MG: 5 INJECTION, POWDER, LYOPHILIZED, FOR SOLUTION INTRAVENOUS at 22:54

## 2017-04-23 RX ADMIN — FUROSEMIDE 20 MG: 20 TABLET ORAL at 17:00

## 2017-04-23 RX ADMIN — INSULIN GLARGINE 55 UNITS: 100 INJECTION, SOLUTION SUBCUTANEOUS at 22:00

## 2017-04-23 RX ADMIN — METRONIDAZOLE 500 MG: 500 TABLET ORAL at 22:00

## 2017-04-23 RX ADMIN — SODIUM CHLORIDE 125 ML/HR: 0.9 INJECTION, SOLUTION INTRAVENOUS at 21:41

## 2017-04-23 RX ADMIN — INSULIN LISPRO 15 UNITS: 100 INJECTION, SOLUTION INTRAVENOUS; SUBCUTANEOUS at 08:30

## 2017-04-23 RX ADMIN — Medication 1 TABLET: at 08:53

## 2017-04-23 RX ADMIN — INSULIN LISPRO 15 UNITS: 100 INJECTION, SOLUTION INTRAVENOUS; SUBCUTANEOUS at 12:29

## 2017-04-23 RX ADMIN — HEPARIN SODIUM 5000 UNITS: 5000 INJECTION, SOLUTION INTRAVENOUS; SUBCUTANEOUS at 21:59

## 2017-04-23 RX ADMIN — VANCOMYCIN HYDROCHLORIDE 1750 MG: 5 INJECTION, POWDER, LYOPHILIZED, FOR SOLUTION INTRAVENOUS at 09:00

## 2017-04-23 RX ADMIN — ISOSORBIDE MONONITRATE 30 MG: 30 TABLET, EXTENDED RELEASE ORAL at 08:55

## 2017-04-23 RX ADMIN — HEPARIN SODIUM 5000 UNITS: 5000 INJECTION, SOLUTION INTRAVENOUS; SUBCUTANEOUS at 14:10

## 2017-04-23 RX ADMIN — METOPROLOL SUCCINATE 100 MG: 50 TABLET, EXTENDED RELEASE ORAL at 08:53

## 2017-04-23 RX ADMIN — CEFAZOLIN SODIUM 2000 MG: 10 INJECTION, POWDER, FOR SOLUTION INTRAVENOUS at 09:00

## 2017-04-24 ENCOUNTER — APPOINTMENT (INPATIENT)
Dept: MRI IMAGING | Facility: HOSPITAL | Age: 72
DRG: 854 | End: 2017-04-24
Payer: MEDICARE

## 2017-04-24 ENCOUNTER — ANESTHESIA EVENT (INPATIENT)
Dept: PERIOP | Facility: HOSPITAL | Age: 72
DRG: 854 | End: 2017-04-24
Payer: MEDICARE

## 2017-04-24 ENCOUNTER — APPOINTMENT (INPATIENT)
Dept: CT IMAGING | Facility: HOSPITAL | Age: 72
DRG: 854 | End: 2017-04-24
Payer: MEDICARE

## 2017-04-24 LAB
ANION GAP SERPL CALCULATED.3IONS-SCNC: 5 MMOL/L (ref 4–13)
BACTERIA BLD CULT: NORMAL
BACTERIA BLD CULT: NORMAL
BACTERIA WND AEROBE CULT: NORMAL
BUN SERPL-MCNC: 18 MG/DL (ref 5–25)
CALCIUM SERPL-MCNC: 8.8 MG/DL (ref 8.3–10.1)
CHLORIDE SERPL-SCNC: 106 MMOL/L (ref 100–108)
CO2 SERPL-SCNC: 33 MMOL/L (ref 21–32)
CREAT SERPL-MCNC: 1.3 MG/DL (ref 0.6–1.3)
ERYTHROCYTE [DISTWIDTH] IN BLOOD BY AUTOMATED COUNT: 13.5 % (ref 11.6–15.1)
GFR SERPL CREATININE-BSD FRML MDRD: >60 ML/MIN/1.73SQ M
GLUCOSE SERPL-MCNC: 153 MG/DL (ref 65–140)
GLUCOSE SERPL-MCNC: 162 MG/DL (ref 65–140)
GLUCOSE SERPL-MCNC: 70 MG/DL (ref 65–140)
GLUCOSE SERPL-MCNC: 82 MG/DL (ref 65–140)
GLUCOSE SERPL-MCNC: 87 MG/DL (ref 65–140)
GRAM STN SPEC: NORMAL
HCT VFR BLD AUTO: 36.8 % (ref 36.5–49.3)
HGB BLD-MCNC: 12.6 G/DL (ref 12–17)
MCH RBC QN AUTO: 31 PG (ref 26.8–34.3)
MCHC RBC AUTO-ENTMCNC: 34.2 G/DL (ref 31.4–37.4)
MCV RBC AUTO: 91 FL (ref 82–98)
PLATELET # BLD AUTO: 155 THOUSANDS/UL (ref 149–390)
PMV BLD AUTO: 11 FL (ref 8.9–12.7)
POTASSIUM SERPL-SCNC: 4 MMOL/L (ref 3.5–5.3)
RBC # BLD AUTO: 4.06 MILLION/UL (ref 3.88–5.62)
SODIUM SERPL-SCNC: 144 MMOL/L (ref 136–145)
WBC # BLD AUTO: 10.84 THOUSAND/UL (ref 4.31–10.16)

## 2017-04-24 PROCEDURE — 73700 CT LOWER EXTREMITY W/O DYE: CPT

## 2017-04-24 PROCEDURE — 73720 MRI LWR EXTREMITY W/O&W/DYE: CPT

## 2017-04-24 PROCEDURE — 85027 COMPLETE CBC AUTOMATED: CPT | Performed by: PODIATRIST

## 2017-04-24 PROCEDURE — 82948 REAGENT STRIP/BLOOD GLUCOSE: CPT

## 2017-04-24 PROCEDURE — A9585 GADOBUTROL INJECTION: HCPCS | Performed by: PODIATRIST

## 2017-04-24 PROCEDURE — 80048 BASIC METABOLIC PNL TOTAL CA: CPT | Performed by: PODIATRIST

## 2017-04-24 PROCEDURE — 87040 BLOOD CULTURE FOR BACTERIA: CPT | Performed by: INTERNAL MEDICINE

## 2017-04-24 PROCEDURE — 87493 C DIFF AMPLIFIED PROBE: CPT | Performed by: PODIATRIST

## 2017-04-24 RX ORDER — SODIUM CHLORIDE 9 MG/ML
125 INJECTION, SOLUTION INTRAVENOUS CONTINUOUS
Status: CANCELLED | OUTPATIENT
Start: 2017-04-24

## 2017-04-24 RX ADMIN — BIMATOPROST 1 DROP: 0.1 SOLUTION/ DROPS OPHTHALMIC at 21:23

## 2017-04-24 RX ADMIN — ISOSORBIDE MONONITRATE 30 MG: 30 TABLET, EXTENDED RELEASE ORAL at 08:11

## 2017-04-24 RX ADMIN — ASPIRIN 81 MG: 81 TABLET, CHEWABLE ORAL at 08:09

## 2017-04-24 RX ADMIN — HEPARIN SODIUM 5000 UNITS: 5000 INJECTION, SOLUTION INTRAVENOUS; SUBCUTANEOUS at 13:25

## 2017-04-24 RX ADMIN — INSULIN LISPRO 15 UNITS: 100 INJECTION, SOLUTION INTRAVENOUS; SUBCUTANEOUS at 08:11

## 2017-04-24 RX ADMIN — METRONIDAZOLE 500 MG: 500 TABLET ORAL at 13:25

## 2017-04-24 RX ADMIN — Medication 1 TABLET: at 08:08

## 2017-04-24 RX ADMIN — CEFAZOLIN SODIUM 2000 MG: 10 INJECTION, POWDER, FOR SOLUTION INTRAVENOUS at 01:21

## 2017-04-24 RX ADMIN — GABAPENTIN 600 MG: 300 CAPSULE ORAL at 21:17

## 2017-04-24 RX ADMIN — HEPARIN SODIUM 5000 UNITS: 5000 INJECTION, SOLUTION INTRAVENOUS; SUBCUTANEOUS at 21:17

## 2017-04-24 RX ADMIN — GADOBUTROL 11 ML: 604.72 INJECTION INTRAVENOUS at 23:52

## 2017-04-24 RX ADMIN — CEFAZOLIN SODIUM 2000 MG: 2 SOLUTION INTRAVENOUS at 17:20

## 2017-04-24 RX ADMIN — SODIUM CHLORIDE 125 ML/HR: 0.9 INJECTION, SOLUTION INTRAVENOUS at 19:41

## 2017-04-24 RX ADMIN — METRONIDAZOLE 500 MG: 500 TABLET ORAL at 05:30

## 2017-04-24 RX ADMIN — CEFAZOLIN SODIUM 2000 MG: 10 INJECTION, POWDER, FOR SOLUTION INTRAVENOUS at 08:15

## 2017-04-24 RX ADMIN — SODIUM CHLORIDE 125 ML/HR: 0.9 INJECTION, SOLUTION INTRAVENOUS at 08:08

## 2017-04-24 RX ADMIN — INSULIN GLARGINE 20 UNITS: 100 INJECTION, SOLUTION SUBCUTANEOUS at 21:38

## 2017-04-24 RX ADMIN — METRONIDAZOLE 500 MG: 500 TABLET ORAL at 21:17

## 2017-04-24 RX ADMIN — FUROSEMIDE 20 MG: 20 TABLET ORAL at 17:11

## 2017-04-24 RX ADMIN — Medication 524 MG: at 15:05

## 2017-04-24 RX ADMIN — FUROSEMIDE 20 MG: 20 TABLET ORAL at 08:09

## 2017-04-24 RX ADMIN — PANTOPRAZOLE SODIUM 20 MG: 20 TABLET, DELAYED RELEASE ORAL at 05:31

## 2017-04-24 RX ADMIN — METOPROLOL SUCCINATE 100 MG: 50 TABLET, EXTENDED RELEASE ORAL at 08:08

## 2017-04-24 RX ADMIN — HEPARIN SODIUM 5000 UNITS: 5000 INJECTION, SOLUTION INTRAVENOUS; SUBCUTANEOUS at 05:31

## 2017-04-25 ENCOUNTER — APPOINTMENT (INPATIENT)
Dept: NON INVASIVE DIAGNOSTICS | Facility: HOSPITAL | Age: 72
DRG: 854 | End: 2017-04-25
Payer: MEDICARE

## 2017-04-25 ENCOUNTER — GENERIC CONVERSION - ENCOUNTER (OUTPATIENT)
Dept: OTHER | Facility: OTHER | Age: 72
End: 2017-04-25

## 2017-04-25 ENCOUNTER — ANESTHESIA (INPATIENT)
Dept: PERIOP | Facility: HOSPITAL | Age: 72
DRG: 854 | End: 2017-04-25
Payer: MEDICARE

## 2017-04-25 PROBLEM — R19.7 DIARRHEA: Status: ACTIVE | Noted: 2017-04-25

## 2017-04-25 PROBLEM — A40.9 STREPTOCOCCAL SEPSIS (HCC): Status: ACTIVE | Noted: 2017-04-25

## 2017-04-25 LAB
ANION GAP SERPL CALCULATED.3IONS-SCNC: 5 MMOL/L (ref 4–13)
BUN SERPL-MCNC: 16 MG/DL (ref 5–25)
C DIFF TOX GENS STL QL NAA+PROBE: NORMAL
CALCIUM SERPL-MCNC: 8.4 MG/DL (ref 8.3–10.1)
CHLORIDE SERPL-SCNC: 105 MMOL/L (ref 100–108)
CO2 SERPL-SCNC: 31 MMOL/L (ref 21–32)
CREAT SERPL-MCNC: 1.22 MG/DL (ref 0.6–1.3)
ERYTHROCYTE [DISTWIDTH] IN BLOOD BY AUTOMATED COUNT: 13.3 % (ref 11.6–15.1)
GFR SERPL CREATININE-BSD FRML MDRD: >60 ML/MIN/1.73SQ M
GLUCOSE SERPL-MCNC: 109 MG/DL (ref 65–140)
GLUCOSE SERPL-MCNC: 109 MG/DL (ref 65–140)
GLUCOSE SERPL-MCNC: 117 MG/DL (ref 65–140)
GLUCOSE SERPL-MCNC: 120 MG/DL (ref 65–140)
GLUCOSE SERPL-MCNC: 124 MG/DL (ref 65–140)
GLUCOSE SERPL-MCNC: 180 MG/DL (ref 65–140)
GLUCOSE SERPL-MCNC: 190 MG/DL (ref 65–140)
GLUCOSE SERPL-MCNC: 248 MG/DL (ref 65–140)
HCT VFR BLD AUTO: 36.5 % (ref 36.5–49.3)
HGB BLD-MCNC: 12.3 G/DL (ref 12–17)
MCH RBC QN AUTO: 30.3 PG (ref 26.8–34.3)
MCHC RBC AUTO-ENTMCNC: 33.7 G/DL (ref 31.4–37.4)
MCV RBC AUTO: 90 FL (ref 82–98)
PLATELET # BLD AUTO: 179 THOUSANDS/UL (ref 149–390)
PMV BLD AUTO: 10.8 FL (ref 8.9–12.7)
POTASSIUM SERPL-SCNC: 3.7 MMOL/L (ref 3.5–5.3)
RBC # BLD AUTO: 4.06 MILLION/UL (ref 3.88–5.62)
SODIUM SERPL-SCNC: 141 MMOL/L (ref 136–145)
WBC # BLD AUTO: 7.18 THOUSAND/UL (ref 4.31–10.16)

## 2017-04-25 PROCEDURE — 93306 TTE W/DOPPLER COMPLETE: CPT

## 2017-04-25 PROCEDURE — 85027 COMPLETE CBC AUTOMATED: CPT | Performed by: PODIATRIST

## 2017-04-25 PROCEDURE — 82948 REAGENT STRIP/BLOOD GLUCOSE: CPT

## 2017-04-25 PROCEDURE — 80048 BASIC METABOLIC PNL TOTAL CA: CPT | Performed by: PODIATRIST

## 2017-04-25 PROCEDURE — 0JBQ0ZZ EXCISION OF RIGHT FOOT SUBCUTANEOUS TISSUE AND FASCIA, OPEN APPROACH: ICD-10-PCS | Performed by: PODIATRIST

## 2017-04-25 DEVICE — IMPLANTABLE DEVICE: Type: IMPLANTABLE DEVICE | Site: FOOT | Status: FUNCTIONAL

## 2017-04-25 RX ORDER — HEPARIN SODIUM 5000 [USP'U]/ML
5000 INJECTION, SOLUTION INTRAVENOUS; SUBCUTANEOUS EVERY 8 HOURS SCHEDULED
Status: DISCONTINUED | OUTPATIENT
Start: 2017-04-25 | End: 2017-04-28 | Stop reason: HOSPADM

## 2017-04-25 RX ORDER — MEPERIDINE HYDROCHLORIDE 50 MG/ML
12.5 INJECTION INTRAMUSCULAR; INTRAVENOUS; SUBCUTANEOUS AS NEEDED
Status: DISCONTINUED | OUTPATIENT
Start: 2017-04-25 | End: 2017-04-25 | Stop reason: HOSPADM

## 2017-04-25 RX ORDER — ALBUTEROL SULFATE 2.5 MG/3ML
2.5 SOLUTION RESPIRATORY (INHALATION) ONCE AS NEEDED
Status: DISCONTINUED | OUTPATIENT
Start: 2017-04-25 | End: 2017-04-25 | Stop reason: HOSPADM

## 2017-04-25 RX ORDER — FENTANYL CITRATE 50 UG/ML
INJECTION, SOLUTION INTRAMUSCULAR; INTRAVENOUS AS NEEDED
Status: DISCONTINUED | OUTPATIENT
Start: 2017-04-25 | End: 2017-04-25 | Stop reason: SURG

## 2017-04-25 RX ORDER — FENTANYL CITRATE/PF 50 MCG/ML
50 SYRINGE (ML) INJECTION
Status: DISCONTINUED | OUTPATIENT
Start: 2017-04-25 | End: 2017-04-25 | Stop reason: HOSPADM

## 2017-04-25 RX ORDER — DIPHENOXYLATE HYDROCHLORIDE AND ATROPINE SULFATE 2.5; .025 MG/1; MG/1
1 TABLET ORAL 4 TIMES DAILY
Status: DISPENSED | OUTPATIENT
Start: 2017-04-25 | End: 2017-04-26

## 2017-04-25 RX ORDER — ZOLPIDEM TARTRATE 5 MG/1
10 TABLET ORAL
Status: DISCONTINUED | OUTPATIENT
Start: 2017-04-25 | End: 2017-04-28 | Stop reason: HOSPADM

## 2017-04-25 RX ORDER — PROPOFOL 10 MG/ML
INJECTION, EMULSION INTRAVENOUS AS NEEDED
Status: DISCONTINUED | OUTPATIENT
Start: 2017-04-25 | End: 2017-04-25 | Stop reason: SURG

## 2017-04-25 RX ORDER — MIDAZOLAM HYDROCHLORIDE 1 MG/ML
INJECTION INTRAMUSCULAR; INTRAVENOUS AS NEEDED
Status: DISCONTINUED | OUTPATIENT
Start: 2017-04-25 | End: 2017-04-25 | Stop reason: SURG

## 2017-04-25 RX ADMIN — SODIUM CHLORIDE 125 ML/HR: 0.9 INJECTION, SOLUTION INTRAVENOUS at 17:00

## 2017-04-25 RX ADMIN — INSULIN LISPRO 2 UNITS: 100 INJECTION, SOLUTION INTRAVENOUS; SUBCUTANEOUS at 09:16

## 2017-04-25 RX ADMIN — HEPARIN SODIUM 5000 UNITS: 5000 INJECTION, SOLUTION INTRAVENOUS; SUBCUTANEOUS at 21:33

## 2017-04-25 RX ADMIN — GABAPENTIN 600 MG: 300 CAPSULE ORAL at 21:33

## 2017-04-25 RX ADMIN — METRONIDAZOLE 500 MG: 500 TABLET ORAL at 05:42

## 2017-04-25 RX ADMIN — INSULIN GLARGINE 55 UNITS: 100 INJECTION, SOLUTION SUBCUTANEOUS at 21:34

## 2017-04-25 RX ADMIN — Medication 1 TABLET: at 09:12

## 2017-04-25 RX ADMIN — FENTANYL CITRATE 50 MCG: 50 INJECTION, SOLUTION INTRAMUSCULAR; INTRAVENOUS at 17:30

## 2017-04-25 RX ADMIN — PROPOFOL 40 MG: 10 INJECTION, EMULSION INTRAVENOUS at 17:30

## 2017-04-25 RX ADMIN — METOPROLOL SUCCINATE 100 MG: 50 TABLET, EXTENDED RELEASE ORAL at 09:12

## 2017-04-25 RX ADMIN — ASPIRIN 81 MG: 81 TABLET, CHEWABLE ORAL at 09:09

## 2017-04-25 RX ADMIN — METRONIDAZOLE 500 MG: 500 TABLET ORAL at 15:13

## 2017-04-25 RX ADMIN — SODIUM CHLORIDE 125 ML/HR: 0.9 INJECTION, SOLUTION INTRAVENOUS at 05:45

## 2017-04-25 RX ADMIN — ISOSORBIDE MONONITRATE 30 MG: 30 TABLET, EXTENDED RELEASE ORAL at 09:11

## 2017-04-25 RX ADMIN — DIPHENOXYLATE HYDROCHLORIDE AND ATROPINE SULFATE 1 TABLET: 2.5; .025 TABLET ORAL at 21:33

## 2017-04-25 RX ADMIN — CEFAZOLIN SODIUM 2000 MG: 2 SOLUTION INTRAVENOUS at 09:10

## 2017-04-25 RX ADMIN — FENTANYL CITRATE 25 MCG: 50 INJECTION, SOLUTION INTRAMUSCULAR; INTRAVENOUS at 17:58

## 2017-04-25 RX ADMIN — PANTOPRAZOLE SODIUM 20 MG: 20 TABLET, DELAYED RELEASE ORAL at 05:42

## 2017-04-25 RX ADMIN — BIMATOPROST 1 DROP: 0.1 SOLUTION/ DROPS OPHTHALMIC at 21:33

## 2017-04-25 RX ADMIN — CEFAZOLIN SODIUM 2000 MG: 2 SOLUTION INTRAVENOUS at 17:00

## 2017-04-25 RX ADMIN — METRONIDAZOLE 500 MG: 500 TABLET ORAL at 21:33

## 2017-04-25 RX ADMIN — MIDAZOLAM HYDROCHLORIDE 2 MG: 1 INJECTION, SOLUTION INTRAMUSCULAR; INTRAVENOUS at 17:30

## 2017-04-25 RX ADMIN — SODIUM CHLORIDE 125 ML/HR: 0.9 INJECTION, SOLUTION INTRAVENOUS at 20:13

## 2017-04-25 RX ADMIN — CEFAZOLIN SODIUM 2000 MG: 2 SOLUTION INTRAVENOUS at 00:57

## 2017-04-25 RX ADMIN — FENTANYL CITRATE 25 MCG: 50 INJECTION, SOLUTION INTRAMUSCULAR; INTRAVENOUS at 17:52

## 2017-04-25 RX ADMIN — FUROSEMIDE 20 MG: 20 TABLET ORAL at 09:11

## 2017-04-26 PROBLEM — N18.30 CKD (CHRONIC KIDNEY DISEASE) STAGE 3, GFR 30-59 ML/MIN (HCC): Status: ACTIVE | Noted: 2017-04-26

## 2017-04-26 LAB
ANION GAP SERPL CALCULATED.3IONS-SCNC: 4 MMOL/L (ref 4–13)
BUN SERPL-MCNC: 14 MG/DL (ref 5–25)
CALCIUM SERPL-MCNC: 8.2 MG/DL (ref 8.3–10.1)
CHLORIDE SERPL-SCNC: 107 MMOL/L (ref 100–108)
CO2 SERPL-SCNC: 30 MMOL/L (ref 21–32)
CREAT SERPL-MCNC: 1.11 MG/DL (ref 0.6–1.3)
ERYTHROCYTE [DISTWIDTH] IN BLOOD BY AUTOMATED COUNT: 13.4 % (ref 11.6–15.1)
GFR SERPL CREATININE-BSD FRML MDRD: >60 ML/MIN/1.73SQ M
GLUCOSE SERPL-MCNC: 100 MG/DL (ref 65–140)
GLUCOSE SERPL-MCNC: 152 MG/DL (ref 65–140)
GLUCOSE SERPL-MCNC: 154 MG/DL (ref 65–140)
GLUCOSE SERPL-MCNC: 208 MG/DL (ref 65–140)
GLUCOSE SERPL-MCNC: 87 MG/DL (ref 65–140)
GLUCOSE SERPL-MCNC: 94 MG/DL (ref 65–140)
HCT VFR BLD AUTO: 34.2 % (ref 36.5–49.3)
HGB BLD-MCNC: 11.5 G/DL (ref 12–17)
MCH RBC QN AUTO: 30.3 PG (ref 26.8–34.3)
MCHC RBC AUTO-ENTMCNC: 33.6 G/DL (ref 31.4–37.4)
MCV RBC AUTO: 90 FL (ref 82–98)
PLATELET # BLD AUTO: 178 THOUSANDS/UL (ref 149–390)
PMV BLD AUTO: 10.9 FL (ref 8.9–12.7)
POTASSIUM SERPL-SCNC: 3.8 MMOL/L (ref 3.5–5.3)
RBC # BLD AUTO: 3.8 MILLION/UL (ref 3.88–5.62)
SODIUM SERPL-SCNC: 141 MMOL/L (ref 136–145)
WBC # BLD AUTO: 7.29 THOUSAND/UL (ref 4.31–10.16)

## 2017-04-26 PROCEDURE — 85027 COMPLETE CBC AUTOMATED: CPT | Performed by: PODIATRIST

## 2017-04-26 PROCEDURE — G8978 MOBILITY CURRENT STATUS: HCPCS

## 2017-04-26 PROCEDURE — 82948 REAGENT STRIP/BLOOD GLUCOSE: CPT

## 2017-04-26 PROCEDURE — 97116 GAIT TRAINING THERAPY: CPT

## 2017-04-26 PROCEDURE — 80048 BASIC METABOLIC PNL TOTAL CA: CPT | Performed by: PODIATRIST

## 2017-04-26 PROCEDURE — G8979 MOBILITY GOAL STATUS: HCPCS

## 2017-04-26 PROCEDURE — 97162 PT EVAL MOD COMPLEX 30 MIN: CPT

## 2017-04-26 RX ORDER — SACCHAROMYCES BOULARDII 250 MG
250 CAPSULE ORAL 2 TIMES DAILY
Status: DISCONTINUED | OUTPATIENT
Start: 2017-04-26 | End: 2017-04-28 | Stop reason: HOSPADM

## 2017-04-26 RX ORDER — LOPERAMIDE HYDROCHLORIDE 2 MG/1
2 CAPSULE ORAL 4 TIMES DAILY PRN
Status: DISCONTINUED | OUTPATIENT
Start: 2017-04-26 | End: 2017-04-28 | Stop reason: HOSPADM

## 2017-04-26 RX ADMIN — PANTOPRAZOLE SODIUM 20 MG: 20 TABLET, DELAYED RELEASE ORAL at 04:56

## 2017-04-26 RX ADMIN — METRONIDAZOLE 500 MG: 500 TABLET ORAL at 22:19

## 2017-04-26 RX ADMIN — METRONIDAZOLE 500 MG: 500 TABLET ORAL at 04:56

## 2017-04-26 RX ADMIN — GABAPENTIN 600 MG: 300 CAPSULE ORAL at 22:18

## 2017-04-26 RX ADMIN — HEPARIN SODIUM 5000 UNITS: 5000 INJECTION, SOLUTION INTRAVENOUS; SUBCUTANEOUS at 15:56

## 2017-04-26 RX ADMIN — ASPIRIN 81 MG: 81 TABLET, CHEWABLE ORAL at 08:54

## 2017-04-26 RX ADMIN — LOPERAMIDE HYDROCHLORIDE 2 MG: 2 CAPSULE ORAL at 16:51

## 2017-04-26 RX ADMIN — CEFAZOLIN SODIUM 2000 MG: 2 SOLUTION INTRAVENOUS at 08:55

## 2017-04-26 RX ADMIN — FUROSEMIDE 20 MG: 20 TABLET ORAL at 08:55

## 2017-04-26 RX ADMIN — FUROSEMIDE 20 MG: 20 TABLET ORAL at 22:19

## 2017-04-26 RX ADMIN — BIMATOPROST 1 DROP: 0.1 SOLUTION/ DROPS OPHTHALMIC at 22:20

## 2017-04-26 RX ADMIN — SODIUM CHLORIDE 125 ML/HR: 0.9 INJECTION, SOLUTION INTRAVENOUS at 15:56

## 2017-04-26 RX ADMIN — CEFAZOLIN SODIUM 2000 MG: 2 SOLUTION INTRAVENOUS at 16:51

## 2017-04-26 RX ADMIN — INSULIN LISPRO 15 UNITS: 100 INJECTION, SOLUTION INTRAVENOUS; SUBCUTANEOUS at 08:55

## 2017-04-26 RX ADMIN — Medication 250 MG: at 22:19

## 2017-04-26 RX ADMIN — Medication 1 TABLET: at 08:56

## 2017-04-26 RX ADMIN — HEPARIN SODIUM 5000 UNITS: 5000 INJECTION, SOLUTION INTRAVENOUS; SUBCUTANEOUS at 22:19

## 2017-04-26 RX ADMIN — INSULIN LISPRO 1 UNITS: 100 INJECTION, SOLUTION INTRAVENOUS; SUBCUTANEOUS at 10:07

## 2017-04-26 RX ADMIN — HEPARIN SODIUM 5000 UNITS: 5000 INJECTION, SOLUTION INTRAVENOUS; SUBCUTANEOUS at 04:59

## 2017-04-26 RX ADMIN — CEFAZOLIN SODIUM 2000 MG: 2 SOLUTION INTRAVENOUS at 01:40

## 2017-04-26 RX ADMIN — METRONIDAZOLE 500 MG: 500 TABLET ORAL at 15:56

## 2017-04-26 RX ADMIN — AZELASTINE HYDROCHLORIDE 1 SPRAY: 137 SPRAY, METERED NASAL at 22:19

## 2017-04-26 RX ADMIN — FLUOCINONIDE: 0.5 CREAM TOPICAL at 22:19

## 2017-04-26 RX ADMIN — ISOSORBIDE MONONITRATE 30 MG: 30 TABLET, EXTENDED RELEASE ORAL at 08:55

## 2017-04-26 RX ADMIN — INSULIN LISPRO 15 UNITS: 100 INJECTION, SOLUTION INTRAVENOUS; SUBCUTANEOUS at 16:51

## 2017-04-26 RX ADMIN — SODIUM CHLORIDE 125 ML/HR: 0.9 INJECTION, SOLUTION INTRAVENOUS at 04:56

## 2017-04-26 RX ADMIN — INSULIN GLARGINE 55 UNITS: 100 INJECTION, SOLUTION SUBCUTANEOUS at 22:19

## 2017-04-26 RX ADMIN — INSULIN LISPRO 2 UNITS: 100 INJECTION, SOLUTION INTRAVENOUS; SUBCUTANEOUS at 22:19

## 2017-04-26 RX ADMIN — METOPROLOL SUCCINATE 100 MG: 50 TABLET, EXTENDED RELEASE ORAL at 08:55

## 2017-04-27 ENCOUNTER — GENERIC CONVERSION - ENCOUNTER (OUTPATIENT)
Dept: OTHER | Facility: OTHER | Age: 72
End: 2017-04-27

## 2017-04-27 LAB
ANION GAP SERPL CALCULATED.3IONS-SCNC: 7 MMOL/L (ref 4–13)
BUN SERPL-MCNC: 16 MG/DL (ref 5–25)
CALCIUM SERPL-MCNC: 9 MG/DL (ref 8.3–10.1)
CHLORIDE SERPL-SCNC: 104 MMOL/L (ref 100–108)
CO2 SERPL-SCNC: 30 MMOL/L (ref 21–32)
CREAT SERPL-MCNC: 1.28 MG/DL (ref 0.6–1.3)
ERYTHROCYTE [DISTWIDTH] IN BLOOD BY AUTOMATED COUNT: 13.3 % (ref 11.6–15.1)
GFR SERPL CREATININE-BSD FRML MDRD: >60 ML/MIN/1.73SQ M
GLUCOSE SERPL-MCNC: 118 MG/DL (ref 65–140)
GLUCOSE SERPL-MCNC: 136 MG/DL (ref 65–140)
GLUCOSE SERPL-MCNC: 158 MG/DL (ref 65–140)
GLUCOSE SERPL-MCNC: 182 MG/DL (ref 65–140)
GLUCOSE SERPL-MCNC: 274 MG/DL (ref 65–140)
GLUCOSE SERPL-MCNC: 85 MG/DL (ref 65–140)
GLUCOSE SERPL-MCNC: 86 MG/DL (ref 65–140)
GLUCOSE SERPL-MCNC: 93 MG/DL (ref 65–140)
HCT VFR BLD AUTO: 37.8 % (ref 36.5–49.3)
HGB BLD-MCNC: 12.9 G/DL (ref 12–17)
MCH RBC QN AUTO: 30.6 PG (ref 26.8–34.3)
MCHC RBC AUTO-ENTMCNC: 34.1 G/DL (ref 31.4–37.4)
MCV RBC AUTO: 90 FL (ref 82–98)
PLATELET # BLD AUTO: 199 THOUSANDS/UL (ref 149–390)
PMV BLD AUTO: 11 FL (ref 8.9–12.7)
POTASSIUM SERPL-SCNC: 3.8 MMOL/L (ref 3.5–5.3)
RBC # BLD AUTO: 4.21 MILLION/UL (ref 3.88–5.62)
SODIUM SERPL-SCNC: 141 MMOL/L (ref 136–145)
WBC # BLD AUTO: 9.38 THOUSAND/UL (ref 4.31–10.16)

## 2017-04-27 PROCEDURE — 82948 REAGENT STRIP/BLOOD GLUCOSE: CPT

## 2017-04-27 PROCEDURE — 85027 COMPLETE CBC AUTOMATED: CPT | Performed by: INTERNAL MEDICINE

## 2017-04-27 PROCEDURE — C1751 CATH, INF, PER/CENT/MIDLINE: HCPCS

## 2017-04-27 PROCEDURE — 02HV33Z INSERTION OF INFUSION DEVICE INTO SUPERIOR VENA CAVA, PERCUTANEOUS APPROACH: ICD-10-PCS | Performed by: INTERNAL MEDICINE

## 2017-04-27 PROCEDURE — B548ZZA ULTRASONOGRAPHY OF SUPERIOR VENA CAVA, GUIDANCE: ICD-10-PCS | Performed by: INTERNAL MEDICINE

## 2017-04-27 PROCEDURE — 80048 BASIC METABOLIC PNL TOTAL CA: CPT | Performed by: INTERNAL MEDICINE

## 2017-04-27 PROCEDURE — 36569 INSJ PICC 5 YR+ W/O IMAGING: CPT

## 2017-04-27 RX ORDER — ONDANSETRON 2 MG/ML
4 INJECTION INTRAMUSCULAR; INTRAVENOUS EVERY 4 HOURS PRN
Status: DISCONTINUED | OUTPATIENT
Start: 2017-04-27 | End: 2017-04-28 | Stop reason: HOSPADM

## 2017-04-27 RX ADMIN — FUROSEMIDE 20 MG: 20 TABLET ORAL at 08:41

## 2017-04-27 RX ADMIN — CEFAZOLIN SODIUM 2000 MG: 2 SOLUTION INTRAVENOUS at 01:54

## 2017-04-27 RX ADMIN — INSULIN LISPRO 1 UNITS: 100 INJECTION, SOLUTION INTRAVENOUS; SUBCUTANEOUS at 14:17

## 2017-04-27 RX ADMIN — ONDANSETRON 4 MG: 2 INJECTION INTRAMUSCULAR; INTRAVENOUS at 16:26

## 2017-04-27 RX ADMIN — GABAPENTIN 600 MG: 300 CAPSULE ORAL at 21:23

## 2017-04-27 RX ADMIN — METRONIDAZOLE 500 MG: 500 TABLET ORAL at 05:10

## 2017-04-27 RX ADMIN — METOPROLOL SUCCINATE 100 MG: 50 TABLET, EXTENDED RELEASE ORAL at 08:41

## 2017-04-27 RX ADMIN — HEPARIN SODIUM 5000 UNITS: 5000 INJECTION, SOLUTION INTRAVENOUS; SUBCUTANEOUS at 05:10

## 2017-04-27 RX ADMIN — ISOSORBIDE MONONITRATE 30 MG: 30 TABLET, EXTENDED RELEASE ORAL at 08:42

## 2017-04-27 RX ADMIN — CEFAZOLIN SODIUM 2000 MG: 2 SOLUTION INTRAVENOUS at 16:26

## 2017-04-27 RX ADMIN — INSULIN GLARGINE 55 UNITS: 100 INJECTION, SOLUTION SUBCUTANEOUS at 21:23

## 2017-04-27 RX ADMIN — INSULIN LISPRO 15 UNITS: 100 INJECTION, SOLUTION INTRAVENOUS; SUBCUTANEOUS at 12:44

## 2017-04-27 RX ADMIN — BIMATOPROST 1 DROP: 0.1 SOLUTION/ DROPS OPHTHALMIC at 21:22

## 2017-04-27 RX ADMIN — Medication 250 MG: at 18:19

## 2017-04-27 RX ADMIN — PANTOPRAZOLE SODIUM 20 MG: 20 TABLET, DELAYED RELEASE ORAL at 05:10

## 2017-04-27 RX ADMIN — CEFAZOLIN SODIUM 2000 MG: 2 SOLUTION INTRAVENOUS at 08:42

## 2017-04-27 RX ADMIN — Medication 1 TABLET: at 08:41

## 2017-04-27 RX ADMIN — HEPARIN SODIUM 5000 UNITS: 5000 INJECTION, SOLUTION INTRAVENOUS; SUBCUTANEOUS at 14:17

## 2017-04-27 RX ADMIN — INSULIN LISPRO 4 UNITS: 100 INJECTION, SOLUTION INTRAVENOUS; SUBCUTANEOUS at 10:18

## 2017-04-27 RX ADMIN — HEPARIN SODIUM 5000 UNITS: 5000 INJECTION, SOLUTION INTRAVENOUS; SUBCUTANEOUS at 21:22

## 2017-04-27 RX ADMIN — Medication 250 MG: at 08:41

## 2017-04-27 RX ADMIN — FUROSEMIDE 20 MG: 20 TABLET ORAL at 18:19

## 2017-04-27 RX ADMIN — ASPIRIN 81 MG: 81 TABLET, CHEWABLE ORAL at 08:41

## 2017-04-28 ENCOUNTER — APPOINTMENT (INPATIENT)
Dept: PHYSICAL THERAPY | Facility: HOSPITAL | Age: 72
DRG: 854 | End: 2017-04-28
Payer: MEDICARE

## 2017-04-28 VITALS
DIASTOLIC BLOOD PRESSURE: 68 MMHG | TEMPERATURE: 96.5 F | SYSTOLIC BLOOD PRESSURE: 103 MMHG | HEART RATE: 66 BPM | RESPIRATION RATE: 18 BRPM | OXYGEN SATURATION: 98 % | BODY MASS INDEX: 30.19 KG/M2 | WEIGHT: 248 LBS

## 2017-04-28 LAB
GLUCOSE SERPL-MCNC: 88 MG/DL (ref 65–140)
GLUCOSE SERPL-MCNC: 98 MG/DL (ref 65–140)

## 2017-04-28 PROCEDURE — 97530 THERAPEUTIC ACTIVITIES: CPT

## 2017-04-28 PROCEDURE — 82948 REAGENT STRIP/BLOOD GLUCOSE: CPT

## 2017-04-28 PROCEDURE — 97116 GAIT TRAINING THERAPY: CPT

## 2017-04-28 RX ADMIN — Medication 250 MG: at 08:00

## 2017-04-28 RX ADMIN — CEFAZOLIN SODIUM 2000 MG: 2 SOLUTION INTRAVENOUS at 01:19

## 2017-04-28 RX ADMIN — INSULIN LISPRO 15 UNITS: 100 INJECTION, SOLUTION INTRAVENOUS; SUBCUTANEOUS at 08:01

## 2017-04-28 RX ADMIN — HEPARIN SODIUM 5000 UNITS: 5000 INJECTION, SOLUTION INTRAVENOUS; SUBCUTANEOUS at 05:50

## 2017-04-28 RX ADMIN — Medication 1 TABLET: at 08:00

## 2017-04-28 RX ADMIN — ASPIRIN 81 MG: 81 TABLET, CHEWABLE ORAL at 08:00

## 2017-04-28 RX ADMIN — ISOSORBIDE MONONITRATE 30 MG: 30 TABLET, EXTENDED RELEASE ORAL at 08:01

## 2017-04-28 RX ADMIN — FLUOCINONIDE: 0.5 CREAM TOPICAL at 08:01

## 2017-04-28 RX ADMIN — CEFAZOLIN SODIUM 2000 MG: 2 SOLUTION INTRAVENOUS at 09:06

## 2017-04-28 RX ADMIN — PANTOPRAZOLE SODIUM 20 MG: 20 TABLET, DELAYED RELEASE ORAL at 05:50

## 2017-04-28 RX ADMIN — LOPERAMIDE HYDROCHLORIDE 2 MG: 2 CAPSULE ORAL at 08:00

## 2017-04-28 RX ADMIN — FUROSEMIDE 20 MG: 20 TABLET ORAL at 08:00

## 2017-04-28 RX ADMIN — ZOLPIDEM TARTRATE 10 MG: 5 TABLET, FILM COATED ORAL at 00:12

## 2017-04-28 RX ADMIN — AZELASTINE HYDROCHLORIDE 1 SPRAY: 137 SPRAY, METERED NASAL at 08:01

## 2017-04-28 RX ADMIN — METOPROLOL SUCCINATE 100 MG: 50 TABLET, EXTENDED RELEASE ORAL at 08:00

## 2017-04-29 LAB
BACTERIA BLD CULT: NORMAL
BACTERIA BLD CULT: NORMAL

## 2017-05-01 ENCOUNTER — GENERIC CONVERSION - ENCOUNTER (OUTPATIENT)
Dept: OTHER | Facility: OTHER | Age: 72
End: 2017-05-01

## 2017-05-01 DIAGNOSIS — R19.7 DIARRHEA: ICD-10-CM

## 2017-05-04 ENCOUNTER — ALLSCRIPTS OFFICE VISIT (OUTPATIENT)
Dept: WOUND CARE | Facility: HOSPITAL | Age: 72
End: 2017-05-04
Payer: MEDICARE

## 2017-05-04 PROCEDURE — 99213 OFFICE O/P EST LOW 20 MIN: CPT | Performed by: PODIATRIST

## 2017-05-11 ENCOUNTER — ALLSCRIPTS OFFICE VISIT (OUTPATIENT)
Dept: WOUND CARE | Facility: HOSPITAL | Age: 72
End: 2017-05-11
Payer: MEDICARE

## 2017-05-11 PROCEDURE — 11042 DBRDMT SUBQ TIS 1ST 20SQCM/<: CPT | Performed by: PODIATRIST

## 2017-05-12 ENCOUNTER — TRANSCRIBE ORDERS (OUTPATIENT)
Dept: LAB | Facility: CLINIC | Age: 72
End: 2017-05-12

## 2017-05-12 ENCOUNTER — APPOINTMENT (OUTPATIENT)
Dept: LAB | Facility: CLINIC | Age: 72
End: 2017-05-12
Payer: MEDICARE

## 2017-05-12 DIAGNOSIS — R78.81 BACTEREMIA: ICD-10-CM

## 2017-05-12 DIAGNOSIS — R78.81 BACTEREMIA: Primary | ICD-10-CM

## 2017-05-12 PROCEDURE — 36415 COLL VENOUS BLD VENIPUNCTURE: CPT

## 2017-05-12 PROCEDURE — 87040 BLOOD CULTURE FOR BACTERIA: CPT

## 2017-05-17 LAB — BACTERIA BLD CULT: NORMAL

## 2017-05-18 ENCOUNTER — ALLSCRIPTS OFFICE VISIT (OUTPATIENT)
Dept: WOUND CARE | Facility: HOSPITAL | Age: 72
End: 2017-05-18
Payer: MEDICARE

## 2017-05-18 PROCEDURE — 11042 DBRDMT SUBQ TIS 1ST 20SQCM/<: CPT | Performed by: PODIATRIST

## 2017-05-22 ENCOUNTER — GENERIC CONVERSION - ENCOUNTER (OUTPATIENT)
Dept: OTHER | Facility: OTHER | Age: 72
End: 2017-05-22

## 2017-05-25 ENCOUNTER — ALLSCRIPTS OFFICE VISIT (OUTPATIENT)
Dept: WOUND CARE | Facility: HOSPITAL | Age: 72
End: 2017-05-25
Payer: MEDICARE

## 2017-05-25 PROCEDURE — 11042 DBRDMT SUBQ TIS 1ST 20SQCM/<: CPT | Performed by: PODIATRIST

## 2017-06-01 ENCOUNTER — ALLSCRIPTS OFFICE VISIT (OUTPATIENT)
Dept: WOUND CARE | Facility: HOSPITAL | Age: 72
End: 2017-06-01
Payer: MEDICARE

## 2017-06-01 PROCEDURE — 99212 OFFICE O/P EST SF 10 MIN: CPT | Performed by: PODIATRIST

## 2017-06-15 ENCOUNTER — ALLSCRIPTS OFFICE VISIT (OUTPATIENT)
Dept: OTHER | Facility: OTHER | Age: 72
End: 2017-06-15

## 2017-06-15 ENCOUNTER — HOSPITAL ENCOUNTER (OUTPATIENT)
Dept: RADIOLOGY | Facility: HOSPITAL | Age: 72
Discharge: HOME | End: 2017-06-15
Attending: INTERNAL MEDICINE
Payer: MEDICARE

## 2017-06-15 ENCOUNTER — APPOINTMENT (OUTPATIENT)
Dept: LAB | Facility: HOSPITAL | Age: 72
End: 2017-06-15
Attending: INTERNAL MEDICINE
Payer: MEDICARE

## 2017-06-15 DIAGNOSIS — I10 ESSENTIAL (PRIMARY) HYPERTENSION: ICD-10-CM

## 2017-06-15 DIAGNOSIS — E11.65 TYPE 2 DIABETES MELLITUS WITH HYPERGLYCEMIA (HCC): ICD-10-CM
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
## 2017-06-15 DIAGNOSIS — I25.10 ATHEROSCLEROTIC HEART DISEASE OF NATIVE CORONARY ARTERY WITHOUT ANGINA PECTORIS: ICD-10-CM

## 2017-06-15 DIAGNOSIS — R19.7 DIARRHEA: ICD-10-CM

## 2017-06-15 LAB
25(OH)D3 SERPL-MCNC: 43.9 NG/ML (ref 30–100)
ALBUMIN SERPL BCP-MCNC: 3.6 G/DL (ref 3.5–5)
ALP SERPL-CCNC: 93 U/L (ref 46–116)
ALT SERPL W P-5'-P-CCNC: 71 U/L (ref 12–78)
ANION GAP SERPL CALCULATED.3IONS-SCNC: 4 MMOL/L (ref 4–13)
AST SERPL W P-5'-P-CCNC: 47 U/L (ref 5–45)
BASOPHILS # BLD AUTO: 0.03 THOUSANDS/ΜL (ref 0–0.1)
BASOPHILS NFR BLD AUTO: 0 % (ref 0–1)
BILIRUB SERPL-MCNC: 0.38 MG/DL (ref 0.2–1)
BUN SERPL-MCNC: 18 MG/DL (ref 5–25)
CALCIUM SERPL-MCNC: 8.9 MG/DL (ref 8.3–10.1)
CHLORIDE SERPL-SCNC: 104 MMOL/L (ref 100–108)
CHOLEST SERPL-MCNC: 139 MG/DL (ref 50–200)
CO2 SERPL-SCNC: 34 MMOL/L (ref 21–32)
CREAT SERPL-MCNC: 1.39 MG/DL (ref 0.6–1.3)
EOSINOPHIL # BLD AUTO: 0.18 THOUSAND/ΜL (ref 0–0.61)
EOSINOPHIL NFR BLD AUTO: 2 % (ref 0–6)
ERYTHROCYTE [DISTWIDTH] IN BLOOD BY AUTOMATED COUNT: 13.8 % (ref 11.6–15.1)
EST. AVERAGE GLUCOSE BLD GHB EST-MCNC: 189 MG/DL
GFR SERPL CREATININE-BSD FRML MDRD: >60 ML/MIN/1.73SQ M
GGT SERPL-CCNC: 37 U/L (ref 5–85)
GLUCOSE P FAST SERPL-MCNC: 96 MG/DL (ref 65–99)
HBA1C MFR BLD: 8.2 % (ref 4.2–6.3)
HCT VFR BLD AUTO: 38.8 % (ref 36.5–49.3)
HDLC SERPL-MCNC: 37 MG/DL (ref 40–60)
HGB BLD-MCNC: 13.7 G/DL (ref 12–17)
LDLC SERPL CALC-MCNC: 59 MG/DL (ref 0–100)
LYMPHOCYTES # BLD AUTO: 2.99 THOUSANDS/ΜL (ref 0.6–4.47)
LYMPHOCYTES NFR BLD AUTO: 38 % (ref 14–44)
MAGNESIUM SERPL-MCNC: 1.9 MG/DL (ref 1.6–2.6)
MCH RBC QN AUTO: 31.4 PG (ref 26.8–34.3)
MCHC RBC AUTO-ENTMCNC: 35.3 G/DL (ref 31.4–37.4)
MCV RBC AUTO: 89 FL (ref 82–98)
MONOCYTES # BLD AUTO: 0.55 THOUSAND/ΜL (ref 0.17–1.22)
MONOCYTES NFR BLD AUTO: 7 % (ref 4–12)
NEUTROPHILS # BLD AUTO: 4.18 THOUSANDS/ΜL (ref 1.85–7.62)
NEUTS SEG NFR BLD AUTO: 53 % (ref 43–75)
NRBC BLD AUTO-RTO: 0 /100 WBCS
PLATELET # BLD AUTO: 161 THOUSANDS/UL (ref 149–390)
PMV BLD AUTO: 11.4 FL (ref 8.9–12.7)
POTASSIUM SERPL-SCNC: 4.2 MMOL/L (ref 3.5–5.3)
PROT SERPL-MCNC: 7.5 G/DL (ref 6.4–8.2)
RBC # BLD AUTO: 4.37 MILLION/UL (ref 3.88–5.62)
SODIUM SERPL-SCNC: 142 MMOL/L (ref 136–145)
T4 FREE SERPL-MCNC: 0.79 NG/DL (ref 0.76–1.46)
TRIGL SERPL-MCNC: 213 MG/DL
TSH SERPL DL<=0.05 MIU/L-ACNC: 2.73 UIU/ML (ref 0.36–3.74)
WBC # BLD AUTO: 7.93 THOUSAND/UL (ref 4.31–10.16)

## 2017-06-15 PROCEDURE — 82306 VITAMIN D 25 HYDROXY: CPT

## 2017-06-15 PROCEDURE — 80061 LIPID PANEL: CPT

## 2017-06-15 PROCEDURE — 83036 HEMOGLOBIN GLYCOSYLATED A1C: CPT

## 2017-06-15 PROCEDURE — 80053 COMPREHEN METABOLIC PANEL: CPT

## 2017-06-15 PROCEDURE — 85025 COMPLETE CBC W/AUTO DIFF WBC: CPT

## 2017-06-15 PROCEDURE — 82977 ASSAY OF GGT: CPT

## 2017-06-15 PROCEDURE — 36415 COLL VENOUS BLD VENIPUNCTURE: CPT

## 2017-06-15 PROCEDURE — 72040 X-RAY EXAM NECK SPINE 2-3 VW: CPT

## 2017-06-15 PROCEDURE — 84439 ASSAY OF FREE THYROXINE: CPT

## 2017-06-15 PROCEDURE — 84443 ASSAY THYROID STIM HORMONE: CPT

## 2017-06-15 PROCEDURE — 73030 X-RAY EXAM OF SHOULDER: CPT

## 2017-06-15 PROCEDURE — 83735 ASSAY OF MAGNESIUM: CPT

## 2017-06-16 ENCOUNTER — GENERIC CONVERSION - ENCOUNTER (OUTPATIENT)
Dept: OTHER | Facility: OTHER | Age: 72
End: 2017-06-16

## 2017-06-20 ENCOUNTER — GENERIC CONVERSION - ENCOUNTER (OUTPATIENT)
Dept: OTHER | Facility: OTHER | Age: 72
End: 2017-06-20

## 2017-06-30 ENCOUNTER — GENERIC CONVERSION - ENCOUNTER (OUTPATIENT)
Dept: OTHER | Facility: OTHER | Age: 72
End: 2017-06-30

## 2017-07-26 ENCOUNTER — GENERIC CONVERSION - ENCOUNTER (OUTPATIENT)
Dept: OTHER | Facility: OTHER | Age: 72
End: 2017-07-26

## 2017-08-15 DIAGNOSIS — I10 ESSENTIAL (PRIMARY) HYPERTENSION: ICD-10-CM

## 2017-08-21 ENCOUNTER — GENERIC CONVERSION - ENCOUNTER (OUTPATIENT)
Dept: OTHER | Facility: OTHER | Age: 72
End: 2017-08-21

## 2017-09-06 ENCOUNTER — GENERIC CONVERSION - ENCOUNTER (OUTPATIENT)
Dept: OTHER | Facility: OTHER | Age: 72
End: 2017-09-06

## 2017-09-08 ENCOUNTER — GENERIC CONVERSION - ENCOUNTER (OUTPATIENT)
Dept: OTHER | Facility: OTHER | Age: 72
End: 2017-09-08

## 2017-09-18 ENCOUNTER — GENERIC CONVERSION - ENCOUNTER (OUTPATIENT)
Dept: OTHER | Facility: OTHER | Age: 72
End: 2017-09-18

## 2017-09-22 ENCOUNTER — GENERIC CONVERSION - ENCOUNTER (OUTPATIENT)
Dept: OTHER | Facility: OTHER | Age: 72
End: 2017-09-22

## 2017-09-25 ENCOUNTER — GENERIC CONVERSION - ENCOUNTER (OUTPATIENT)
Dept: OTHER | Facility: OTHER | Age: 72
End: 2017-09-25

## 2017-09-26 ENCOUNTER — GENERIC CONVERSION - ENCOUNTER (OUTPATIENT)
Dept: OTHER | Facility: OTHER | Age: 72
End: 2017-09-26

## 2017-09-28 ENCOUNTER — ALLSCRIPTS OFFICE VISIT (OUTPATIENT)
Dept: OTHER | Facility: OTHER | Age: 72
End: 2017-09-28

## 2017-10-17 ENCOUNTER — ALLSCRIPTS OFFICE VISIT (OUTPATIENT)
Dept: OTHER | Facility: OTHER | Age: 72
End: 2017-10-17

## 2017-10-27 NOTE — PROGRESS NOTES
Assessment  1  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,440 9) (I25 10)   2  Anemia (285 9) (D64 9)   3  Diabetes mellitus type 2, uncontrolled (250 02) (E11 65)   4  Diabetic neuropathy, type II diabetes mellitus (250 60,357 2) (E11 40)   5  GERD without esophagitis (530 81) (K21 9)   6  Type 2 diabetes mellitus with hyperglycemia (250 00) (E11 65)   7  Depression (311) (F32 9)    Plan  Diabetes mellitus type 2, uncontrolled    · (1) BASIC METABOLIC PROFILE; Status:Active; Requested for:66Xda4487;    · (1) CBC/PLT/DIFF; Status:Active; Requested for:01Inf2897;    · (1) MAGNESIUM; Status:Active; Requested for:18Tis4562;    · (1) T4, FREE; Status:Active; Requested for:01Nxr5560;    · (1) TSH; Status:Active; Requested for:58Nvp7793;    · (1) VITAMIN D 25-HYDROXY; Status:Active; Requested for:35Kjs0980;    · Follow-up visit in 3 months Evaluation and Treatment  Follow-up  Status: Hold For - Scheduling   Requested for: 75ALC5543  Diabetic ulcer of right foot associated with type 2 diabetes mellitus    · Cephalexin 500 MG Oral Capsule  Need for prophylactic vaccination and inoculation against influenza    · Fluzone High-Dose 0 5 ML Intramuscular Suspension Prefilled Syringe  Unlinked    · CeFAZolin Sodium 1-5 GM-% SOLN   · Oxycodone-Acetaminophen 5-325 MG Oral Tablet   · Vitamin D2 2000 UNIT Oral Tablet    Discussion/Summary  Discussion Summary:   Impression was started on new antidepressant by the [facility] and will need to get the medication and added to the list he is supposed to call us  Not suicidalpressure controlnot control started and with endocrinologist start a metformin 500 twice a day as some loose stools I told him if that is an issue that continues with a week or 2 to try the extended release formulation  artery disease stable no anginawound of the right foot over the heel [facility] on [facility] note     Counseling Documentation With Imm: The patient was counseled regarding diagnostic results,-- instructions for management,-- risk factor reductions,-- prognosis,-- patient and family education,-- impressions,-- risks and benefits of treatment options,-- importance of compliance with treatment  Medication SE Review and Pt Understands Tx: Possible side effects of new medications were reviewed with the patient/guardian today  The treatment plan was reviewed with the patient/guardian  The patient/guardian understands and agrees with the treatment plan      Chief Complaint  Chief Complaint Free Text Note Form: 4 month f/u for DMendocrinologist 9/25, started on Metformin yesterdayget lab work todayshot todayscore 7pneumonia vaccine at [place] last week      History of Present Illness  HPI: Problem #1 diabetes mellitus on control hemoglobin A1c 9 7 so endocrinologist started him on metformin 500 twice a day which is an excellent idea the Lantus and the NovoLog insulin stayed the same going to monitor his blood sugars carefully and follow up with their office  pressure excellent control  No change in medication at this particular time for blood pressure he is taking a total of succinate 100 mg per day he takes furosemide 20 mg daily  artery disease previous CABG no angina  He is on isosorbide mononitrate ER 1 tablet every day  is a little depressed now he went to the [place] clinic he was started on antidepressant he'll call us and let us know  depression scale here is a 7  He has no thoughts of committing suicide which is good news  There is no history of bipolar disorder    on the BSA reviewed with the patient was shows as followsis 0 8 to TSH 8 [id number] creatinine 1 5138 potassium 4 2 chloride 100 CO2 30 GFR calculated 55function studies with deep within normal limits SGOT 31 SGPT 58creatinine ratio is 101A1c 9 714 hematocrit 39 white count 9600 platelet count 913,270 urinalysis unremarkable  puncture wound on the right foot totally resolved he was cleared by wound care which is excellent  did not complain of any orthopedic issues  were reviewed in detail reconciliate her  Review of Systems  Complete-Male:   Constitutional: No fever or chills, feels well, no tiredness, no recent weight gain or weight loss  Eyes: No complaints of eye pain, no red eyes, no discharge from eyes, no itchy eyes  ENT: no complaints of earache, no hearing loss, no nosebleeds, no nasal discharge, no sore throat, no hoarseness  Cardiovascular: No complaints of slow heart rate, no fast heart rate, no chest pain, no palpitations, no leg claudication, no lower extremity  Respiratory: No complaints of shortness of breath, no wheezing, no cough, no SOB on exertion, no orthopnea or PND  Gastrointestinal: No complaints of abdominal pain, no constipation, no nausea or vomiting, no diarrhea or bloody stools  Genitourinary: No complaints of dysuria, no incontinence, no hesitancy, no nocturia, no genital lesion, no testicular pain  Musculoskeletal: No complaints of arthralgia, no myalgias, no joint swelling or stiffness, no limb pain or swelling  Integumentary: No complaints of skin rash or skin lesions, no itching, no skin wound, no dry skin  Neurological: No compliants of headache, no confusion, no convulsions, no numbness or tingling, no dizziness or fainting, no limb weakness, no difficulty walking  Psychiatric: Is not suicidal, no sleep disturbances, no anxiety or depression, no change in personality, no emotional problems  Endocrine: No complaints of proptosis, no hot flashes, no muscle weakness, no erectile dysfunction, no deepening of the voice, no feelings of weakness  Hematologic/Lymphatic: No complaints of swollen glands, no swollen glands in the neck, does not bleed easily, no easy bruising  Active Problems  1  Abrasion Of Left Foot (917 0)   2  Abrasion of leg (916 0) (S80 819A)   3  Anemia (285 9) (D64 9)   4  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,440  9) (I25 10)   5  BPH without obstruction/lower urinary tract symptoms (600 00) (N40 0)   6  Chest discomfort (786 59) (R07 89)   7  Chronic heel ulcer (707 14) (L97 409)   8  Depression (311) (F32 9)   9  Diabetes mellitus type 2, uncontrolled (250 02) (E11 65)   10  Diabetic neuropathy, type II diabetes mellitus (250 60,357 2) (E11 40)   11  Diabetic ulcer of right foot associated with type 2 diabetes mellitus (250 80,707 15)    (E11 621,L97 519)   12  Diarrhea (787 91) (R19 7)   13  Diverticulosis (562 10) (K57 90)   14  Dyspnea (786 09) (R06 00)   15  Dysuria (788 1) (R30 0)   16  Eczema (692 9) (L30 9)   17  Elevated ALT measurement (790 4) (R74 0)   18  Elevated AST (SGOT) (790 4) (R74 0)   19  Encounter for screening colonoscopy (V76 51) (Z12 11)   20  Fall, initial encounter (K839 5) (W19 XXXA)   21  Fatty liver (571 8) (K76 0)   22  Foot pain (729 5) (M79 673)   23  Generalized osteoarthritis of multiple sites (715 09) (M15 9)   24  GERD without esophagitis (530 81) (K21 9)   25  Glaucoma screening (V80 1) (Z13 5)   26  Gross hematuria (599 71) (R31 0)   27  Hyperlipidemia (272 4) (E78 5)   28  Hypertension (401 9) (I10)   29  Hypoglycemia (251 2) (E16 2)   30  Hypotension (458 9) (I95 9)   31  Insomnia (780 52) (G47 00)   32  Lower back pain (724 2) (M54 5)   33  Lumbar spondylosis (721 3) (M47 816)   34  Malignant tumor of urinary bladder (188 9) (C67 9)   35  Microhematuria (599 72) (R31 29)   36  Need for pneumococcal vaccination (V03 82) (Z23)   37  Need for prophylactic vaccination and inoculation against influenza (V04 81) (Z23)   38  Non-healing surgical wound, initial encounter (998 83) (T81 89XA)   39  Non-healing surgical wound, subsequent encounter (V58 89,998 83) (T81 89XD)   40  Occult blood positive stool (792 1) (R19 5)   41  Organic impotence (607 84) (N52 9)   42  Other seasonal allergic rhinitis (477 8) (J30 2)   43  Pedal edema (782 3) (R60 0)   44  Peptic ulcer (533 90) (K27 9)   45  Postoperative wound abscess, initial encounter (998 59) (T81 4XXA)   46  Preop examination (V72 84) (Z01 818)   47  Preop examination (V72 84) (Z01 818)   48  Pre-procedure lab exam (V72 63) (Z01 812)   49  Puncture wound of right foot, subsequent encounter (V58 89,892 0) (S91 331D)   50  Screening for genitourinary condition (V81 6) (Z13 89)   51  Screening for neurological condition (V80 09) (Z13 89)   52  Sleep apnea (780 57) (G47 30)   53  Thrombocytopenia (287 5) (D69 6)   54  Tinnitus of both ears (388 30) (H93 13)   55  Type 2 diabetes mellitus with hyperglycemia (250 00) (E11 65)   56  Ulcer On The Feet Dorsal Gonzalez Scale 3  (0-5)   57  Urinary tract infection (599 0) (N39 0)   58  Vitamin D deficiency (268 9) (E55 9)    Past Medical History  1  History of Bladder Cancer (V10 51)   2  History of Diabetic ulcer of right foot associated with type 2 diabetes mellitus (250 80,707 15)   (E11 621,L97 519)   3  History of acute sinusitis (V12 69) (Z87 09)   4  History of transient cerebral ischemia (V12 54) (Z86 73)   5  History of urinary tract infection (V13 02) (Z87 440)   6  History of viral gastroenteritis (V12 09) (Z86 19)   7  Need for prophylactic vaccination and inoculation against influenza (V04 81) (Z23)    Surgical History  1  History of Cath Stent Placement   2  History of Cystoscopy With Biopsy   3  History of Cystourethroscopy   4  History of Diagnostic Cystoscopy   5  History of Gastric Surgery For Morbid Obesity Bypass With Shaji-en-Y   6  History of Interventional Cardiac Catheterization   7  History of Intestinal Surgery Shaji-en-Y   8  History of Knee Arthroplasty    Family History  Mother    1  Family history of Diabetes   2  Family history of Heart disease   3  Family history of High blood pressure  Father    4  Family history of Heart disease  Sister    5  Family history of Diabetes   6  Family history of High blood pressure   7  Family history of Kidney disease  Brother    6   Family history of Heart disease   9  Family history of High blood pressure    Social History   · Former smoker (A87 06) (N73 316)  Social History Reviewed: The social history was reviewed and updated today  Current Meds   1  Accu-Chek Martha Device; USE AS DIRECTED; Therapy: 62IUZ2265 to (Last Rx:38Utb3509) Ordered   2  Accu-Chek Martha Plus In Vitro Strip; CHECK BLOOD SUGARS 3 TIMES A DAY WITH MEALS AND AT   BEDTIME   DIA; Therapy: 33ZZC0291 to (Evaluate:2017)  Requested for: 2017; Last Rx:2017   Ordered   3  Aspirin 81 MG TABS; TAKE 1 TABLET DAILY; Therapy: 23Vtz9606 to Recorded   4  Azelastine HCl - 0 15 % Nasal Solution; INHALE 1 SPRAY Intranasally Twice daily; Therapy: 37EQN1866 to (Evaluate:2016)  Requested for: 21FCB0464; Last Rx:61Htb6874   Ordered   5  BD Insulin Syringe Ultrafine 31G X 5/16 0 3 ML Miscellaneous; USE AS DIRECTED; Therapy: 14FKN8463 to (490 08 485)  Requested for: 38TKZ0939; Last Rx:96Zod0494   Ordered   6  BD Pen Needle Short U/F 31G X 8 MM Miscellaneous; inject insulin five times daily; Therapy: 85BRJ4679 to (Evaluate:69Bzr7786)  Requested for: 12ULW7106; Last Rx:2017   Ordered   7  CeFAZolin Sodium 1-5 GM-% SOLN; USE AS DIRECTED; Therapy: (Recorded:65Ehv9102) to Recorded   8  Cephalexin 500 MG Oral Capsule; TAKE 1 CAPSULE 4 TIMES DAILY UNTIL GONE;   Therapy: 33DDW6680 to ([place])  Requested for: 36NZE5395; Last Rx:2017   Ordered   9  Fluocinonide 0 05 % External Ointment; APPLY SPARINGLY TO AFFECTED AREA(S) TWICE DAILY; Therapy: 66HDQ3469 to (Evaluate:2014)  Requested for: 92EFQ6671; Last Rx:2014   Ordered   10  Fluticasone Propionate 50 MCG/ACT Nasal Suspension; use 2 sprays in each nostril once daily; Therapy: 06PLA0187 to (Evaluate:2015)  Requested for: 98GCH6338; Last Rx:2014    Ordered   11  Furosemide 20 MG Oral Tablet; TAKE 1 TABLET DAILY;     Therapy: 90CFM2348 to (Evaluate:2018)  Requested for: 27GGQ2965; Last Rx:11Ddk8337    Ordered   12  Gabapentin 300 MG Oral Capsule; TAKE 2 CAPSULES AT BEDTIME NIGHTLY; Therapy: 49LBH5324 to (Evaluate:27Mar2018)  Requested for: 53ACA1955 Recorded   13  Isosorbide Mononitrate ER 30 MG Oral Tablet Extended Release 24 Hour; take 1 tablet by mouth    every day; Therapy: 94AXO6576 to (Evaluate:15Jan2018)  Requested for: 34RHV3540; Last Rx:63Mme6867    Ordered   14  Lantus SoloStar 100 UNIT/ML Subcutaneous Solution Pen-injector; Injected 55 units subcutaneous at    bedtime; Therapy: 65BOP1145 to (Evaluate:15Jan2018)  Requested for: 87NKI1507; Last Rx:91Jnt4547    Ordered   15  Loperamide HCl - 2 MG Oral Capsule; TAKE 1 CAPSULE BY MOUTH three times DAILY prn; Therapy: 12SFY9975 to (Dalton Ruelas)  Requested for: 50ZGK0000; Last Rx:91Rjb0414    Ordered   12  Lumigan 0 01 % Ophthalmic Solution; Therapy: 49UWS9442 to Recorded   17  MetFORMIN HCl - 500 MG Oral Tablet; 1 tab bid; Therapy: 34RGG6101 to (Last Rx:43Pdb4480) Ordered   18  Metoprolol Succinate  MG Oral Tablet Extended Release 24 Hour; TAKE 1 TABLET DAILY; Therapy: 92ATB7255 to (Yuliya Oropeza)  Requested for: 34AHB8931; Last Rx:03Nov2015    Ordered   19  Multivitamins TABS; TAKE 1 TABLET DAILY; Therapy: 29Gyq2338 to Recorded   20  NovoLOG FlexPen 100 UNIT/ML Subcutaneous Solution Pen-injector; Use 15 units TID; Therapy: 01Apr2016 to (Evaluate:60Thv5080)  Requested for: 10OIU8449 Recorded   21  Omeprazole 20 MG Oral Capsule Delayed Release; TAKE 1 CAPSULE Daily; Therapy: 37UEI1135 to (Evaluate:15Jan2018)  Requested for: 95OPX5864; Last Rx:08Qhv9874    Ordered   22  Oxycodone-Acetaminophen 5-325 MG Oral Tablet; Therapy: (Recorded:09Mar2017) to Recorded   23  Pepto-Bismol 262 MG Oral Tablet Chewable; one tablet every 4-6 hours when necessary for    diarrhea loose stools; Therapy: 08NQK2073 to (Last Rx:43Qia1640)  Requested for: 17IIQ0194 Ordered   24   SLPG Compound Medication; Bimix: Papaverine 30 mg/ Phentolamine 0 5 mg/ ml;    Therapy: 09HZT6980 to (Last Rx:13Nov2014) Ordered   25  Vitamin D2 2000 UNIT Oral Tablet; Therapy: (Recorded:09Mar2017) to Recorded   26  Vitamin D3 29332 UNIT Oral Capsule; TAKE   3    tablets per month; Therapy: 58GRN9103 to (Evaluate:33Atb5137)  Requested for: 87Lud2988; Last Rx:73Maj7394    Ordered  Medication List Reviewed: The medication list was reviewed and updated today  Allergies  1  HumaLOG SOLN   2  Lipitor TABS   3  Simvastatin TABS   4  Adhesive Tape TAPE    Vitals  Vital Signs    Recorded: 21SHB9336 07:51AM   Temperature 97 3 F   Heart Rate 84   Respiration 15   Systolic 136   Diastolic 68   Height 6 ft 4 in   Weight 255 lb    BMI Calculated 31 04   BSA Calculated 2 46     Physical Exam    Constitutional   General appearance: Abnormal   chronically ill-- and-- overweight  Eyes   Conjunctiva and lids: No swelling, erythema, or discharge  Pupils and irises: Equal, round and reactive to light  Ears, Nose, Mouth, and Throat   External inspection of ears and nose: Normal     Otoscopic examination: Tympanic membrance translucent with normal light reflex  Canals patent without erythema  Nasal mucosa, septum, and turbinates: Normal without edema or erythema  Oropharynx: Normal with no erythema, edema, exudate or lesions  Pulmonary   Respiratory effort: No increased work of breathing or signs of respiratory distress  Auscultation of lungs: Clear to auscultation, equal breath sounds bilaterally, no wheezes, no rales, no rhonci  Cardiovascular   Palpation of heart: Normal PMI, no thrills  Auscultation of heart: Abnormal   The rhythm was regular  Heart sounds: normal S1,-- normal S2-- and-- no gallop heard  Examination of extremities for edema and/or varicosities: Normal     Carotid pulses: Normal     Abdomen   Abdomen: Non-tender, no masses  Liver and spleen: No hepatomegaly or splenomegaly      Lymphatic Palpation of lymph nodes in neck: No lymphadenopathy  Musculoskeletal   Gait and station: Normal     Skin   Skin and subcutaneous tissue: Normal without rashes or lesions  Psychiatric   Orientation to person, place and time: Normal     Mood and affect: Abnormal   Mood and Affect: not anxious,-- calm-- and-- depressed  Results/Data  PHQ-9 Adult Depression Screening 83Bdo8687 08:02AM User, Rulas     Test Name Result Flag Reference   PHQ-9 Adult Depression Score 7     Over the last two weeks, how often have you been bothered by any of the following problems? Little interest or pleasure in doing things: Not at all - 0  Feeling down, depressed, or hopeless: Not at all - 0  Trouble falling or staying asleep, or sleeping too much: Not at all - 0  Feeling tired or having little energy: Nearly every day - 3  Poor appetite or over eating: Not at all - 0  Feeling bad about yourself - or that you are a failure or have let yourself or your family down: Not at all - 0  Trouble concentrating on things, such as reading the newspaper or watching television: More than half the days - 2  Moving or speaking so slowly that other people could have noticed  Or the opposite -  being so fidgety or restless that you have been moving around a lot more than usual: More than half the days - 2  Thoughts that you would be better off dead, or of hurting yourself in some way: Not at all - 0   PHQ-9 Adult Depression Screening Negative     PHQ-9 Difficulty Level Not difficult at all         Future Appointments    Date/Time Provider Specialty Site   10/09/2017 11:00 AM FLORESITA Sloan  Endocrinology [facility] ENDOCRINOLOGY   10/02/2017 11:00 AM ANURADHA Aguilar Diabetes Educator [facility] ENDOCRINOLOGY   10/09/2017 11:00 AM ANURADHA Aguilar Diabetes Educator [facility] ENDOCRINOLOGY   10/02/2017 11:00 AM Endocrinology, Nurse Schedule  [facility] ENDOCRINOLOGY   12/27/2017 10:50 AM LFORESITA Swanson   Endocrinology [facility]CRINOLOGY  [facility]     Signatures   Electronically signed by : FLORESITA Dietrich ; Sep 28 2017  8:24AM EST                       (Author)

## 2017-10-30 ENCOUNTER — GENERIC CONVERSION - ENCOUNTER (OUTPATIENT)
Dept: OTHER | Facility: OTHER | Age: 72
End: 2017-10-30

## 2017-10-30 ENCOUNTER — GENERIC CONVERSION - ENCOUNTER (OUTPATIENT)
Dept: INTERNAL MEDICINE CLINIC | Facility: CLINIC | Age: 72
End: 2017-10-30

## 2017-11-03 ENCOUNTER — ALLSCRIPTS OFFICE VISIT (OUTPATIENT)
Dept: OTHER | Facility: OTHER | Age: 72
End: 2017-11-03

## 2017-12-04 DIAGNOSIS — E11.65 TYPE 2 DIABETES MELLITUS WITH HYPERGLYCEMIA (HCC): ICD-10-CM

## 2017-12-13 ENCOUNTER — GENERIC CONVERSION - ENCOUNTER (OUTPATIENT)
Dept: INTERNAL MEDICINE CLINIC | Facility: CLINIC | Age: 72
End: 2017-12-13

## 2017-12-18 DIAGNOSIS — E11.65 TYPE 2 DIABETES MELLITUS WITH HYPERGLYCEMIA (HCC): ICD-10-CM

## 2017-12-19 ENCOUNTER — APPOINTMENT (OUTPATIENT)
Dept: LAB | Facility: CLINIC | Age: 72
End: 2017-12-19
Payer: MEDICARE

## 2017-12-19 ENCOUNTER — TRANSCRIBE ORDERS (OUTPATIENT)
Dept: LAB | Facility: CLINIC | Age: 72
End: 2017-12-19

## 2017-12-19 ENCOUNTER — GENERIC CONVERSION - ENCOUNTER (OUTPATIENT)
Dept: OTHER | Facility: OTHER | Age: 72
End: 2017-12-19

## 2017-12-19 DIAGNOSIS — E11.65 TYPE 2 DIABETES MELLITUS WITH HYPERGLYCEMIA (HCC): ICD-10-CM

## 2017-12-19 LAB
25(OH)D3 SERPL-MCNC: 45 NG/ML (ref 30–100)
ANION GAP SERPL CALCULATED.3IONS-SCNC: 3 MMOL/L (ref 4–13)
BASOPHILS # BLD AUTO: 0.02 THOUSANDS/ΜL (ref 0–0.1)
BASOPHILS NFR BLD AUTO: 0 % (ref 0–1)
BUN SERPL-MCNC: 21 MG/DL (ref 5–25)
CALCIUM SERPL-MCNC: 8.7 MG/DL (ref 8.3–10.1)
CHLORIDE SERPL-SCNC: 105 MMOL/L (ref 100–108)
CO2 SERPL-SCNC: 31 MMOL/L (ref 21–32)
CREAT SERPL-MCNC: 1.43 MG/DL (ref 0.6–1.3)
CREAT UR-MCNC: 53.4 MG/DL
EOSINOPHIL # BLD AUTO: 0.22 THOUSAND/ΜL (ref 0–0.61)
EOSINOPHIL NFR BLD AUTO: 3 % (ref 0–6)
ERYTHROCYTE [DISTWIDTH] IN BLOOD BY AUTOMATED COUNT: 13.4 % (ref 11.6–15.1)
EST. AVERAGE GLUCOSE BLD GHB EST-MCNC: 192 MG/DL
GFR SERPL CREATININE-BSD FRML MDRD: 55 ML/MIN/1.73SQ M
GLUCOSE SERPL-MCNC: 71 MG/DL (ref 65–140)
HBA1C MFR BLD: 8.3 % (ref 4.2–6.3)
HCT VFR BLD AUTO: 38.6 % (ref 36.5–49.3)
HGB BLD-MCNC: 13.4 G/DL (ref 12–17)
LYMPHOCYTES # BLD AUTO: 2.54 THOUSANDS/ΜL (ref 0.6–4.47)
LYMPHOCYTES NFR BLD AUTO: 31 % (ref 14–44)
MAGNESIUM SERPL-MCNC: 2 MG/DL (ref 1.6–2.6)
MCH RBC QN AUTO: 31.7 PG (ref 26.8–34.3)
MCHC RBC AUTO-ENTMCNC: 34.7 G/DL (ref 31.4–37.4)
MCV RBC AUTO: 91 FL (ref 82–98)
MICROALBUMIN UR-MCNC: 37.3 MG/L (ref 0–20)
MICROALBUMIN/CREAT 24H UR: 70 MG/G CREATININE (ref 0–30)
MONOCYTES # BLD AUTO: 0.55 THOUSAND/ΜL (ref 0.17–1.22)
MONOCYTES NFR BLD AUTO: 7 % (ref 4–12)
NEUTROPHILS # BLD AUTO: 4.9 THOUSANDS/ΜL (ref 1.85–7.62)
NEUTS SEG NFR BLD AUTO: 59 % (ref 43–75)
NRBC BLD AUTO-RTO: 0 /100 WBCS
PLATELET # BLD AUTO: 189 THOUSANDS/UL (ref 149–390)
PMV BLD AUTO: 11.4 FL (ref 8.9–12.7)
POTASSIUM SERPL-SCNC: 4.2 MMOL/L (ref 3.5–5.3)
RBC # BLD AUTO: 4.23 MILLION/UL (ref 3.88–5.62)
SODIUM SERPL-SCNC: 139 MMOL/L (ref 136–145)
T4 FREE SERPL-MCNC: 0.83 NG/DL (ref 0.76–1.46)
TSH SERPL DL<=0.05 MIU/L-ACNC: 1.82 UIU/ML (ref 0.36–3.74)
WBC # BLD AUTO: 8.25 THOUSAND/UL (ref 4.31–10.16)

## 2017-12-19 PROCEDURE — 83036 HEMOGLOBIN GLYCOSYLATED A1C: CPT

## 2017-12-19 PROCEDURE — 36415 COLL VENOUS BLD VENIPUNCTURE: CPT

## 2017-12-19 PROCEDURE — 84443 ASSAY THYROID STIM HORMONE: CPT

## 2017-12-19 PROCEDURE — 83735 ASSAY OF MAGNESIUM: CPT

## 2017-12-19 PROCEDURE — 82043 UR ALBUMIN QUANTITATIVE: CPT

## 2017-12-19 PROCEDURE — 85025 COMPLETE CBC W/AUTO DIFF WBC: CPT

## 2017-12-19 PROCEDURE — 80048 BASIC METABOLIC PNL TOTAL CA: CPT

## 2017-12-19 PROCEDURE — 82306 VITAMIN D 25 HYDROXY: CPT

## 2017-12-19 PROCEDURE — 84439 ASSAY OF FREE THYROXINE: CPT

## 2017-12-19 PROCEDURE — 82570 ASSAY OF URINE CREATININE: CPT

## 2017-12-21 ENCOUNTER — ALLSCRIPTS OFFICE VISIT (OUTPATIENT)
Dept: OTHER | Facility: OTHER | Age: 72
End: 2017-12-21

## 2017-12-22 ENCOUNTER — GENERIC CONVERSION - ENCOUNTER (OUTPATIENT)
Dept: INTERNAL MEDICINE CLINIC | Facility: CLINIC | Age: 72
End: 2017-12-22

## 2017-12-22 NOTE — PROGRESS NOTES
Assessment   1  Diabetes mellitus type 2, uncontrolled (250 02) (E11 65)   2  Hypertension (401 9) (I10)   3  Hypoglycemia (251 2) (E16 2)   4  Type 2 diabetes mellitus with hyperglycemia (250 00) (E11 65)   5  Vitamin D deficiency (268 9) (E55 9)    Plan   Diabetes mellitus type 2, uncontrolled, Diabetic neuropathy, type II diabetes mellitus    · Lantus SoloStar 100 UNIT/ML Subcutaneous Solution Pen-injector; Injected 50 units    subcutaneous at bedtime  Hypertension    · (1) CBC/PLT/DIFF; Status:Active; Requested for:21Mar2018;    · (1) COMPREHENSIVE METABOLIC PANEL; Status:Active; Requested for:21Mar2018;    · (1) GGT; Status:Active; Requested for:21Mar2018;    · (1) LIPID PANEL, FASTING; Status:Active; Requested for:21Mar2018;    · (1) MAGNESIUM; Status:Active; Requested for:21Mar2018;    · (1) T4, FREE; Status:Active; Requested for:21Mar2018;    · (1) TSH; Status:Active; Requested for:21Mar2018;    · (1) VITAMIN D 25-HYDROXY; Status:Active; Requested for:21Mar2018;    · Follow-up visit in 3 months Evaluation and Treatment  Follow-up  Status: Hold For - Scheduling     Requested for: 76Faa3449   · Follow-up visit in 4 Months Evaluation and Treatment  Follow-up  Status: Hold For - Scheduling     Requested for: 11Hzq3605  Screening for genitourinary condition    · *VB - Urinary Incontinence Screen (Dx Z13 89 Screen for UI); Status:Complete - Retrospective    Authorization;   Done: 57FZI9449 08:56AM    Discussion/Summary   Discussion Summary:    Diabetes control pressure control cut down the Lantus from 55-50 units follow-up Endocrinology continue metformin tolerating well see him back in 3 or 4 months sooner if needed  check a lipids when he comes back      Counseling Documentation With Imm: The patient was counseled regarding diagnostic results,-- instructions for management,-- risk factor reductions,-- prognosis,-- patient and family education,-- impressions,-- risks and benefits of treatment options,-- importance of compliance with treatment  Medication SE Review and Pt Understands Tx: Possible side effects of new medications were reviewed with the patient/guardian today  The treatment plan was reviewed with the patient/guardian  The patient/guardian understands and agrees with the treatment plan      Chief Complaint   Chief Complaint Free Text Note Form: 3 month follow up for Diabetes  this morning was 65 fall in the past year urinary incontinence  History of Present Illness   HPI: Problem 1  Diabetes sees an endocrinologist is sugars have been in the 90 was 65 this morning fasting I made a minor adjustment I told him to cut down on the Lantus from 55-50 units at bedtime  pressure excellent control  No change in medications   were review in detail there stable hemoglobin A1c has improved is down to 8 from 9 7    without any problems  has a sour taste all the time but his electrolytes are good  As well as CBC    of bladder cancer is going to go to [facility] tomorrow to have a follow-up no gross hematuria  Review of Systems   Complete-Male:      Constitutional: No fever or chills, feels well, no tiredness, no recent weight gain or weight loss  Eyes: No complaints of eye pain, no red eyes, no discharge from eyes, no itchy eyes  ENT: no complaints of earache, no hearing loss, no nosebleeds, no nasal discharge, no sore throat, no hoarseness  Cardiovascular: No complaints of slow heart rate, no fast heart rate, no chest pain, no palpitations, no leg claudication, no lower extremity  Respiratory: No complaints of shortness of breath, no wheezing, no cough, no SOB on exertion, no orthopnea or PND  Gastrointestinal: No complaints of abdominal pain, no constipation, no nausea or vomiting, no diarrhea or bloody stools  Genitourinary: No complaints of dysuria, no incontinence, no hesitancy, no nocturia, no genital lesion, no testicular pain        Musculoskeletal: No complaints of arthralgia, no myalgias, no joint swelling or stiffness, no limb pain or swelling  Integumentary: No complaints of skin rash or skin lesions, no itching, no skin wound, no dry skin  Neurological: No compliants of headache, no confusion, no convulsions, no numbness or tingling, no dizziness or fainting, no limb weakness, no difficulty walking  Psychiatric: Is not suicidal, no sleep disturbances, no anxiety or depression, no change in personality, no emotional problems  Endocrine: No complaints of proptosis, no hot flashes, no muscle weakness, no erectile dysfunction, no deepening of the voice, no feelings of weakness  Hematologic/Lymphatic: No complaints of swollen glands, no swollen glands in the neck, does not bleed easily, no easy bruising  Active Problems   1  Abrasion Of Left Foot (917 0)   2  Abrasion of leg (916 0) (S80 819A)   3  Anemia (285 9) (D64 9)   4  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,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) (I25 10)   5  BPH without obstruction/lower urinary tract symptoms (600 00) (N40 0)   6  Chest discomfort (786 59) (R07 89)   7  Chronic heel ulcer (707 14) (L97 409)   8  Depression (311) (F32 9)   9  Diabetes mellitus type 2, uncontrolled (250 02) (E11 65)   10  Diabetic neuropathy, type II diabetes mellitus (250 60,357 2) (E11 40)   11  Diabetic ulcer of right foot associated with type 2 diabetes mellitus (250 80,707 15)      (E11 621,L97 519)   12  Diarrhea (787 91) (R19 7)   13  Diverticulosis (562 10) (K57 90)   14  Dyspnea (786 09) (R06 00)   15  Dysuria (788 1) (R30 0)   16  Eczema (692 9) (L30 9)   17  Elevated ALT measurement (790 4) (R74 0)   18  Elevated AST (SGOT) (790 4) (R74 0)   19  Encounter for screening colonoscopy (V76 51) (Z12 11)   20  Fall, initial encounter (M157 0) (W19 XXXA)   21  Fatty liver (571 8) (K76 0)   22  Foot pain (729 5) (M79 673)   23  Generalized osteoarthritis of multiple sites (715 09) (M15 9)   24   GERD without esophagitis (530 81) (K21 9)   25  Glaucoma screening (V80 1) (Z13 5)   26  Gross hematuria (599 71) (R31 0)   27  Hyperlipidemia (272 4) (E78 5)   28  Hypertension (401 9) (I10)   29  Hypoglycemia (251 2) (E16 2)   30  Hypotension (458 9) (I95 9)   31  Insomnia (780 52) (G47 00)   32  Lower back pain (724 2) (M54 5)   33  Lumbar spondylosis (721 3) (M47 816)   34  Malignant tumor of urinary bladder (188 9) (C67 9)   35  Microhematuria (599 72) (R31 29)   36  Need for pneumococcal vaccination (V03 82) (Z23)   37  Need for prophylactic vaccination and inoculation against influenza (V04 81) (Z23)   38  Non-healing surgical wound, initial encounter (998 83) (T81 89XA)   39  Non-healing surgical wound, subsequent encounter (V58 89,998 83) (T81 89XD)   40  Occult blood positive stool (792 1) (R19 5)   41  Organic impotence (607 84) (N52 9)   42  Other seasonal allergic rhinitis (477 8) (J30 2)   43  Pedal edema (782 3) (R60 0)   44  Peptic ulcer (533 90) (K27 9)   45  Postoperative wound abscess, initial encounter (998 59) (T81 4XXA)   46  Preop examination (V72 84) (Z01 818)   47  Preop examination (V72 84) (Z01 818)   48  Pre-procedure lab exam (V72 63) (Z01 812)   49  Puncture wound of right foot, subsequent encounter (V58 89,892 0) (S91 331D)   50  Screening for genitourinary condition (V81 6) (Z13 89)   51  Screening for neurological condition (V80 09) (Z13 89)   52  Sleep apnea (780 57) (G47 30)   53  Thrombocytopenia (287 5) (D69 6)   54  Tinnitus of both ears (388 30) (H93 13)   55  Type 2 diabetes mellitus with hyperglycemia (250 00) (E11 65)   56  Ulcer On The Feet Dorsal Gonzalez Scale 3  (0-5)   57  Urinary tract infection (599 0) (N39 0)   58  Vitamin D deficiency (268 9) (E55 9)    Past Medical History   1  History of Bladder Cancer (V10 51)   2  History of Diabetic ulcer of right foot associated with type 2 diabetes mellitus (250 80,707 15)     (E11 621,L97 519)   3   History of acute sinusitis (V12 69) (Z87 09)   4  History of transient cerebral ischemia (V12 54) (Z86 73)   5  History of urinary tract infection (V13 02) (Z87 440)   6  History of viral gastroenteritis (V12 09) (Z86 19)   7  Need for prophylactic vaccination and inoculation against influenza (V04 81) (Z23)    Surgical History   1  History of Cath Stent Placement   2  History of Cystoscopy With Biopsy   3  History of Cystourethroscopy   4  History of Diagnostic Cystoscopy   5  History of Gastric Surgery For Morbid Obesity Bypass With Shaji-en-Y   6  History of Interventional Cardiac Catheterization   7  History of Intestinal Surgery Shaji-en-Y   8  History of Knee Arthroplasty    Family History   Mother    1  Family history of Diabetes   2  Family history of Heart disease   3  Family history of High blood pressure  Father    4  Family history of Heart disease  Sister    5  Family history of Diabetes   6  Family history of High blood pressure   7  Family history of Kidney disease  Brother    6  Family history of Heart disease   9  Family history of High blood pressure    Social History    · Former smoker (V82 07) (G16 315)  Social History Reviewed: The social history was reviewed and updated today  Current Meds    1  Accu-Chek Martha Plus In Vitro Strip; CHECK BLOOD SUGARS 3 TIMES A DAY WITH MEALS AND AT     BEDTIME     DIA; Therapy: 07DQP9362 to ([phone])  Requested for: 2017; Last Rx:2017     Ordered   2  Aspirin 81 MG TABS; TAKE 1 TABLET DAILY; Therapy: 03Tkk9788 to Recorded   3  Azelastine HCl - 0 15 % Nasal Solution; INHALE 1 SPRAY Intranasally Twice daily; Therapy: 28VBJ0716 to (Evaluate:2016)  Requested for: 88AXX5312; Last Rx:30Arg6441     Ordered   4  BD Insulin Syringe Ultrafine 31G X 5/16 0 3 ML Miscellaneous; USE AS DIRECTED; Therapy: 99CRK2718 to ([phone])  Requested for: 91JJR9160; Last Rx:07Ctq9192     Ordered   5   BD Pen Needle Short U/F 31G X 8 MM Miscellaneous; inject insulin five times daily; Therapy: 55GPD2825 to (Evaluate:15Mar2018)  Requested for: 63OTF4405; Last Rx:48Zty5293     Ordered   6  Fluocinonide 0 05 % External Ointment; APPLY SPARINGLY TO AFFECTED AREA(S) TWICE DAILY; Therapy: 49AWU5965 to (Evaluate:25Mar2014)  Requested for: 20PES8198; Last Rx:24Jan2014     Ordered   7  Fluticasone Propionate 50 MCG/ACT Nasal Suspension; use 2 sprays in each nostril once daily; Therapy: 31TLV7700 to (Evaluate:18Jan2015)  Requested for: 21TGM8791; Last Rx:19Nov2014     Ordered   8  Furosemide 20 MG Oral Tablet; TAKE 1 TABLET DAILY; Therapy: 55GUY3369 to (Evaluate:15Jan2018)  Requested for: 87LNP0335; Last Rx:62Rqy5186     Ordered   9  Gabapentin 300 MG Oral Capsule; TAKE 2 CAPSULES AT BEDTIME NIGHTLY; Therapy: 94YWC9617 to (Alvin Maldonado)  Requested for: 07GBJ5593; Last Rx:06Nov2017     Ordered   10  Isosorbide Mononitrate ER 30 MG Oral Tablet Extended Release 24 Hour; take 1 tablet by mouth      every day; Therapy: 15DXF4038 to (Evaluate:15Jan2018)  Requested for: 69YMV0782; Last Rx:91Tky6889      Ordered   11  Lantus SoloStar 100 UNIT/ML Subcutaneous Solution Pen-injector; Injected 55 units subcutaneous at      bedtime; Therapy: 03DYQ6445 to (Alvin Maldonado)  Requested for: 64OOZ7651; Last Rx:06Nov2017      Ordered   12  Loperamide HCl - 2 MG Oral Capsule; TAKE 1 CAPSULE BY MOUTH three times DAILY prn; Therapy: 57FCM1466 to (Richard Torres)  Requested for: 53TCY8333; Last Rx:06Oct2016      Ordered   15  Lumigan 0 01 % Ophthalmic Solution; Therapy: 10LZI4980 to Recorded   14  MetFORMIN HCl - 500 MG Oral Tablet; 1 tab bid; Therapy: 71KFA9219 to (Alvin Maldonado); Last Rx:06Nov2017 Ordered   15  Metoprolol Succinate  MG Oral Tablet Extended Release 24 Hour; TAKE 1 TABLET DAILY; Therapy: 54MKA6950 to (Alvin Maldonado)  Requested for: 00CCX1540; Last Rx:03Nov2015      Ordered   16   Multivitamins TABS; TAKE 1 TABLET DAILY; Therapy: 58Jok0170 to Recorded   17  NovoLOG FlexPen 100 UNIT/ML Subcutaneous Solution Pen-injector; Use 15 units TID; Therapy: 98Xnn4378 to (Loi Bowen)  Requested for: 13WEP6112; Last Rx:06Nov2017      Ordered   18  Omeprazole 20 MG Oral Capsule Delayed Release; TAKE 1 CAPSULE Daily; Therapy: 04ZVI0130 to (Evaluate:15Jan2018)  Requested for: 43EID1881; Last Rx:79Sdx8866      Ordered   19  OneTouch Delica Lancets 41I Miscellaneous; Use to check blood sugars 4 times daily; Therapy: 67TTB9571 to (Evaluate:58Wnd0925); Last Rx:04Oct2017 Ordered   20  OneTouch Ultra 2 w/Device Kit; Use device to check blood sugars 4 times daily; Therapy: 12ZCV8069 to (Last Rx:04Oct2017) Ordered   21  OneTouch Ultra Blue In [employer]; Use strips to test blood sugars 4 times daily; Therapy: 05NQB3555 to (Evaluate:35Dyp1642); Last Rx:04Oct2017 Ordered   22  Pepto-Bismol 262 MG Oral Tablet Chewable; one tablet every 4-6 hours when necessary for      diarrhea loose stools; Therapy: 08GLG4694 to (Last Rx:12Oqr2489)  Requested for: 63OQM9527 Ordered   23  SLPG Compound Medication; Bimix: Papaverine 30 mg/ Phentolamine 0 5 mg/ ml;      Therapy: 37KDV6344 to (Last Rx:13Nov2014) Ordered   24  Vitamin D3 41037 UNIT Oral Capsule; TAKE   3    tablets per month; Therapy: 29DFZ1517 to (Loi Bowen)  Requested for: 40NYL7529; Last Rx:03Nov2017      Ordered  Medication List Reviewed: The medication list was reviewed and updated today  Allergies   1  HumaLOG SOLN   2  Lipitor TABS   3  Simvastatin TABS   4   Adhesive Tape TAPE    Vitals   Vital Signs    Recorded: 21Dec2017 08:51AM   Temperature 96 8 F   Heart Rate 85   Respiration 15   Systolic 478   Diastolic 84   Height 6 ft 4 in   Weight 256 lb 6 oz   BMI Calculated 31 21   BSA Calculated 2 46         Blood pressure 128/80 left arm sitting 120/80 left arm standing       Physical Exam        Constitutional      General appearance: No acute distress, well appearing and well nourished  Eyes      Conjunctiva and lids: No swelling, erythema, or discharge  Pupils and irises: Equal, round and reactive to light  Ears, Nose, Mouth, and Throat      External inspection of ears and nose: Normal        Otoscopic examination: Tympanic membrance translucent with normal light reflex  Canals patent without erythema  Nasal mucosa, septum, and turbinates: Normal without edema or erythema  Oropharynx: Normal with no erythema, edema, exudate or lesions  Pulmonary      Respiratory effort: No increased work of breathing or signs of respiratory distress  Auscultation of lungs: Clear to auscultation, equal breath sounds bilaterally, no wheezes, no rales, no rhonci  Cardiovascular      Palpation of heart: Normal PMI, no thrills  Auscultation of heart: Normal rate and rhythm, normal S1 and S2, without murmurs  Examination of extremities for edema and/or varicosities: Normal        Carotid pulses: Normal        Abdomen      Abdomen: Non-tender, no masses  Liver and spleen: No hepatomegaly or splenomegaly  Lymphatic      Palpation of lymph nodes in neck: No lymphadenopathy  Musculoskeletal      Gait and station: Normal        Skin      Skin and subcutaneous tissue: Normal without rashes or lesions         Psychiatric      Orientation to person, place and time: Normal        Mood and affect: Normal           Results/Data   Falls Risk Assessment (Dx Z13 89 Screen for Neurologic Disorder) 12LTU0062 08:58AM User, [employer]      Test Name Result Flag Reference   Falls Risk      1 fall without injury in the past year      *VB - Urinary Incontinence Screen (Dx Z13 89 Screen for UI) 23EXM5036 08:56AM [facility]      Test Name Result Flag Reference   Urinary Incontinence Assessment 30Bqs3406        Falls Risk Assessment (Dx Z13 89 Screen for Neurologic Disorder) 79EZU6097 08:58AM User, [facility] Test Name Result Flag Reference   Falls Risk      1 fall without injury in the past year      *VB - Urinary Incontinence Screen (Dx Z13 89 Screen for UI) 75Bwf4300 08:56AM [facility]      Test Name Result Flag Reference   Urinary Incontinence Assessment 01AGS7092        (1) HEMOGLOBIN A1C 07QPL0626 12:26PM Aida Hopper Order Number: [id number]      Test Name Result Flag Reference   HEMOGLOBIN A1C 8 3 % H 4 2-6 3   EST  AVG  GLUCOSE 192 mg/dl        (1) MICROALBUMIN CREATININE RATIO, RANDOM URINE 39Jty1482 12:26PM Aida Hopper Order Number: [id number]      Test Name Result Flag Reference   MICROALBUMIN/ CREAT R 70 mg/g creatinine H 0-30   MICROALBUMIN,URINE 37 3 mg/L H 0 0-20 0   CREATININE URINE 53 4 mg/dL        (1) BASIC METABOLIC PROFILE 43BWR4325 12:26PM Aida Hopper Order Number: [id number]      Test Name Result Flag Reference   GLUCOSE,RANDM 71 mg/dL     If the patient is fasting, the ADA then defines impaired fasting glucose as > 100 mg/dL and diabetes as > or equal to 123 mg/dL  Specimen collection should occur prior to Sulfasalazine administration due to the potential for falsely depressed results  Specimen collection should occur prior to Sulfapyridine administration due to the potential for falsely elevated results  SODIUM 139 mmol/L  136-145   POTASSIUM 4 2 mmol/L  3 5-5 3   CHLORIDE 105 mmol/L  100-108   CARBON DIOXIDE 31 mmol/L  21-32   ANION GAP (CALC) 3 mmol/L L 4-13   BLOOD UREA NITROGEN 21 mg/dL  5-25   CREATININE 1 43 mg/dL H 0 60-1 30   Standardized to IDMS reference method   CALCIUM 8 7 mg/dL  8 3-10 1   eGFR 55 ml/min/1 73sq m     [facility] Disease Education Program recommendations are as follows:     GFR calculation is accurate only with a steady state creatinine     Chronic Kidney disease less than 60 ml/min/1 73 sq  meters     Kidney failure less than 15 ml/min/1 73 sq  meters        (1) CBC/PLT/DIFF 12GKO1500 12: Angela Salomon 157, Cynthia Spotted      Test Name Result Flag Reference   WBC COUNT 8 25 Thousand/uL  4 31-10 16   RBC COUNT 4 23 Million/uL  3 88-5 62   HEMOGLOBIN 13 4 g/dL  12 0-17 0   HEMATOCRIT 38 6 %  36 5-49 3   MCV 91 fL  82-98   MCH 31 7 pg  26 8-34 3   MCHC 34 7 g/dL  31 4-37 4   RDW 13 4 %  11 6-15 1   MPV 11 4 fL  8 9-12 7   PLATELET COUNT 478 Thousands/uL  149-390   nRBC AUTOMATED 0 /100 WBCs     NEUTROPHILS RELATIVE PERCENT 59 %  43-75   LYMPHOCYTES RELATIVE PERCENT 31 %  14-44   MONOCYTES RELATIVE PERCENT 7 %  4-12   EOSINOPHILS RELATIVE PERCENT 3 %  0-6   BASOPHILS RELATIVE PERCENT 0 %  0-1   NEUTROPHILS ABSOLUTE COUNT 4 90 Thousands/? ??L  1 85-7 62   LYMPHOCYTES ABSOLUTE COUNT 2 54 Thousands/? ??L  0 60-4 47   MONOCYTES ABSOLUTE COUNT 0 55 Thousand/? ??L  0 17-1 22   EOSINOPHILS ABSOLUTE COUNT 0 22 Thousand/? ??L  0 00-0 61   BASOPHILS ABSOLUTE COUNT 0 02 Thousands/? ??L  0 00-0 10      (1) T4, FREE 28PHA7228 12:26PM [facility]      Test Name Result Flag Reference   T4,FREE 0 83 ng/dL  0 76-1 46   Specimen collection should occur prior to Sulfasalazine administration due to the potential for falsely elevated results  (1) TSH 58DQW2065 12:26PM [facility]      Test Name Result Flag Reference   TSH 1 820 uIU/mL  0 358-3 740   Patients undergoing fluorescein dye angiography may retain small amounts of fluorescein in the body for 48-72 hours post procedure  Samples containing fluorescein can produce falsely depressed TSH values  If the patient had this procedure,a specimen should be resubmitted post fluorescein clearance        (1) MAGNESIUM 18KWO5884 12:26PM [facility]      Test Name Result Flag Reference   MAGNESIUM 2 0 mg/dL  1 6-2 6      (1) VITAMIN D 25-HYDROXY 85HZI6321 12:26PM [facility]      Test Name Result Flag Reference   VIT D 25-HYDROX 45 0 ng/mL  30 0-100 0   This assay is a certified procedure of the CDC Vitamin D Standardization Certification Program (VDSCP)           Deficiency <20ng/ml Insufficiency 20-30ng/ml      Sufficient  ng/ml           *Patients undergoing fluorescein dye angiography may retain small amounts of fluorescein in the body for 48-72 hours post procedure  Samples containing fluorescein can produce falsely elevated Vitamin D values  If the patient had this procedure, a specimen should be resubmitted post fluorescein clearance  DSMT/MNT Time Record 56NUG9662 11:32AM Me[facility]      Test Name Result Flag Reference   Date of Service 11/3/17     Start - Stop Time 9:45-11     Total MInutes 75     Group Or Individual Instruction MNT-I        Future Appointments      Date/Time Provider Specialty Site   12/27/2017 10:50 AM FLORESITA Mcmillan   Endocrinology [facility] ENDOCRINOLOGY  [facility]    Electronically signed by : FLORESITA Dodge ; Dec 21 2017  9:36AM EST                       (Author)

## 2017-12-27 ENCOUNTER — GENERIC CONVERSION - ENCOUNTER (OUTPATIENT)
Dept: OTHER | Facility: OTHER | Age: 72
End: 2017-12-27

## 2018-01-08 ENCOUNTER — GENERIC CONVERSION - ENCOUNTER (OUTPATIENT)
Dept: OTHER | Facility: OTHER | Age: 73
End: 2018-01-08

## 2018-01-09 NOTE — RESULT NOTES
Message  Patient returned today to complete professional CGM study, Dexcom G4 Professional  He noted that he has been ill all week and glucoses having been running much higher than usual  He hasn't been eating, has been skipping some of this insulin for fear of hypoglycemia, and doesn't think the sensor reflects his typical diabetes profile at all  He is on his way to his PCP's office later this morning  He would like to repeat the study once he's back to normal and will contact us in a week or so to reschedule  Dexcom reports were downloaded and submitted for MD review with the above information notated        Signatures   Electronically signed by : Rivka Giron, ; Oct 17 2017 10:54AM EST                       (Author)

## 2018-01-10 NOTE — MISCELLANEOUS
Message   Recorded as Task   Date: 10/03/2016 09:02 AM, Created By: Ann Moe   Task Name: Care Coordination   Assigned To: [address] [address] clinical,Team   Regarding Patient: Oniel Perez, Status: Active   CommentJohnмария Moe - 03 Oct 2016 9:02 AM     TASK CREATED  Caller: Self; Care Coordination; [phone] (Home)  ****Dr Siddharth Huang pt ****    Received call from pt  on [place] triage line  Pt  states that he had received a call from [facility] on Friday and was returning that call  Pt  states that he was supposed to schedule an MRI, but hasn't done so yet because he was told by the MRI center that they need more info on the script  Pt  was asked if there was something specific that the MRI center needed on the script  Pt  states he is not sure what they need  Pt  was advised that at this time, I will refax script to [facility] at [phone]  Pt  was advised that I will include our office number on the fax, so that if there are any specific changes needed, LMIC can contact our office directly  Pt  was advised that any changes needed may have to wait until Wednesday for our NP if on-call physician does not feel comfortable adjusting script  Pt  verbalized understanding and was appreciative  Pt  was also advised that I can discuss the anxiolytic script w/ AO on Wednesday  Pt  appreciative and asked to be reached on his cell phone, [phone]  Faxed MRI lumbar spine to [facility]  Received c/b from April at [facility], who states that she received the faxed script, but since pt  has a hx of lumbar spine sx, the script needs to be w/ and w/o contrast  April requested revised script be refaxed to her or she can be reached at [phone] w/ any further questions  Please advise  Thank you     Beck Sutton - 08 Oct 2016 9:27 AM     TASK REPLIED TO: Previously Assigned To [address] [address] clinical,Team  I put in a new order for the MRI Lumbar spine w & wout contrast  I also put in the BW order for a BUN & CReat he will need prior to the MRI  He will need to have that done first  Hope this helps  Toño Rittertlin - 23 Oct 2016 9:36 AM     TASK REPLIED TO: Previously Assigned To [id number] [place] [address] clinical,Team  Faxed updated MRI order to April at [facility]  Toño Rittertlin - 03 Oct 2016 9:37 AM     TASK REPLIED TO: Previously Assigned To [address] [place] [address] clinical,Team  Attempted to reach pt  on cell phone as requested earlier  LMOM for pt  to c/b  RiyaCecile - 03 Oct 2016 9:37 AM     TASK IN PROGRESS   Toño Rittertlin - 43 Oct 2016 10:11 AM     TASK EDITED  Received VM from pt  on [facility] triage line from 1006 am  Pt  states that he was returning a message  Pt  can be reached at [phone]  RiyaCecile - 33 Oct 2016 10:15 AM     TASK REPLIED TO: Previously Assigned To [address] [place] [address] clinical,Team  Attempted to reach pt  at number provided above  LMOM for pt  to c/b  RiyaCecile - 03 Oct 2016 10:15 AM     TASK IN PROGRESS   Toño Rittertlin - 03 Oct 2016 10:26 AM     TASK REPLIED TO: Previously Assigned To Ken Romero  S/w pt  and advised of above  Pt  verbalized understanding and is requesting that BUN/Creatinine lab orders [facility]  Pt  was advised that I will fax the lab orders at this time  Pt  states he will go today to get the labs drawn  Pt  then asked if the MRI center is supposed to be contact him  Pt  was advised that I believe since they have the correct order now, they should contact pt  to schedule appt  , however, if pt  does not hear from the MRI center, can call them  Pt  verbalized understanding and was appreciative  BUN/Creatinine orders faxed to [phone] at this time  **Will forward to AO regarding her views on prescribing anxiolytic prior to pt's MRI **    Please advise  Thank you  Ken Romero - 05 Oct 2016 8:11 AM     TASK REPLIED TO: Previously Assigned To Ken Romero  aware  I would be willing to provide him something for anxiety prior to his MRI, he should call the office when he has a date scheduled  Cecile Ritter - 05 Oct 2016 9:05 AM     TASK Dorie Portillo - 05 Oct 2016 9:06 AM     TASK EDITED  ****According to alternative task from 10/3  DG states pt  is not allowed to proceed w/ MRI until his sees his PCP in f/u for high creatinine levels  ****   Cecile Ritter - 05 Oct 2016 9:46 AM     TASK REPLIED TO: Previously Assigned To [address] [place] [address],Team  S/w pt  and advised of above  Pt  verbalized understanding and states that the MRI center called him yesterday and told him that they can proceed w/ the MRI c/o contrast because of his high creatinine level  Per pt  he was scheduled yesterday to see Dr Chiara Taylor, and cx'd the appt  because the MRI center told him that he could have the MRI w/o contast  Pt  states that he is scheduled for MRI on 10/11 w/o contrast      Pt  was advised of above in regards to anxiolytic  Pt  states that the MRI will be open air, so he does not need anxiolytic anymore  Pt  was appreciative of our call  S/w April at [facility] MRI center  April was advised of above  April states that she s/w pt  yest  to schedule, and pt  is scheduled for MRI w/ and w/o contrast on 10/11  Per April, she never told the pt  that they would proceed w/o contrast, she did tell pt  his GFR was "a little low, but injectable " April was advised that I will s/w our NP and see how she would like to proceed  For now, keep MRI as scheduled, maybe we can s/w Dr Shamar Wilkinson office and get verbal approval/denial of if pt  should or should proceed w/ MRI  April verbalized understanding and agreed w/ plan  Do you want me to contact Dr Shamar Wilkinson office and relay creatinine levels instead of having pt  reschedule OV w/ Dr Chiara Taylor to obtain clearance? Please advise  Thank you     [employer] - 05 Oct 2016 1:10 PM     TASK REPLIED TO: Previously Assigned To Naveed Reyes  The patient needs to obtain clearance prior to the MRI with contrast  If his PCPs office will do that verbally based on his results thats ok with me  Cecile Ritter - 05 Oct 2016 1:31 PM     TASK REPLIED TO: Previously Assigned To [address] [place] [address] clinical,Team  ****FYI****    S/w Khadijah at Dr Gary Steele office  Ashley Hernandez states that pt  was supposed to come in yesterday for an appt  w/ Dr Bakari Benedict, but then called saying that Dr Sera Amaro office told him he could proceed w/ the MRI w/o contrast so there was no need to see Dr Bakari Hernandez was advised of my conversations w/ both the pt  and April above  Khadijah verbalized understanding and states that pt  can be seen tomorrow at 1030 am w/ Dr Bakari Hernandez was advised that I will contact pt  to inform him at this time  S/w pt  and advised of above  Pt  verbalized understanding and states that he will be at the appt  w/ Dr Bakari Benedict tomorrow morning at 1030 am  Pt  appreciative of our call  S/w April at [facility] MRI center and advised of above  April verbalized understanding and states that even if PCP does not want pt  to proceed w/ contrast, the MRI center can still proceed w/ an MRI w/o contrast on 10/11  April was advised that I will c/b tomorrow to inform her of the decision from Dr Bakari Benedict  April appreciative     Vamsi Manzo - 05 Oct 2016 2:36 PM     TASK REPLIED TO: Previously Assigned To Naveed Reyes  aware and agree Ratna Vann - 05 Oct 2016 2:58 PM     TASK IN PROGRESS   Jayleen Cummins - 06 Oct 2016 12:07 PM     TASK EDITED    S/w patient concerning this mornings appt  with pcp  He verbalized understanding of pcp's intructions for blood work for 10/11 and stated that pcp will call spine and pain office with results  Attempted to reach April at MRI center, spoke with representative and provided office number for c/b     Jayleen Cummins - 06 Oct 2016 1:40 PM     TASK EDITED    c/b received on  from April from imaging services MRI at 13:04 on 10/6  S/w April at MRI about Pt  having blood work to check GFR and creatinine on 10/10 prior to his MRI with contrast on 10/11  April requested that the results be faxed to their office at [phone] when available  Jayleen Cummins - 06 Oct 2016 1:51 PM     TASK EDITED         S/w Shanta from pcp office relayed request to have blood work results faxed to April at MRI on 10/10 when available  Fax number was provided as [phone], as well as April's ph# of [phone]  Shanta from pcp's office stated that she would "take care of this" Monday 10/10 when they receive results of lab work  Sienna Mcdaniel - 10 Oct 2016 2:42 PM     TASK EDITED  Kim Heart from [facility] office called stating that the repeat bloodwork came back ok and he was given permission to have MRI done tomorrow  They wanted Dr Watkins updated  Darlin Wayne - 10 Oct 2016 3:34 PM     TASK REPLIED TO: Previously Assigned To Jonathan Watkins                   aware thanks        Active Problems    1  Abrasion Of Left Foot (917 0)   2  Abrasion of leg (916 0) (S80 819A)   3  Anemia (285 9) (D64 9)   4  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,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) (I25 10)   5  BPH without obstruction/lower urinary tract symptoms (600 00) (N40 0)   6  Chest discomfort (786 59) (R07 89)   7  Depression (311) (F32 9)   8  Diabetes mellitus type 2, uncontrolled (250 02) (E11 65)   9  Diabetic neuropathy, type II diabetes mellitus (250 60,357 2) (E11 40)   10  Diarrhea (787 91) (R19 7)   11  Diverticulosis (562 10) (K57 90)   12  Dyspnea (786 09) (R06 00)   13  Dysuria (788 1) (R30 0)   14  Eczema (692 9) (L30 9)   15  Elevated ALT measurement (790 4) (R74 0)   16  Elevated AST (SGOT) (790 4) (R74 0)   17  Encounter for screening colonoscopy (V76 51) (Z12 11)   18  Fatty liver (571 8) (K76 0)   19  Foot pain (729 5) (M79 673)   20  Generalized osteoarthritis of multiple sites (715 09) (M15 9)   21   GERD without esophagitis (530 81) (K21 9)   22  Gross hematuria (599 71) (R31 0)   23  Hyperlipidemia (272 4) (E78 5)   24  Hypertension (401 9) (I10)   25  Hypoglycemia (251 2) (E16 2)   26  Hypotension (458 9) (I95 9)   27  Insomnia (780 52) (G47 00)   28  Lower back pain (724 2) (M54 5)   29  Lumbar spondylosis (721 3) (M47 816)   30  Malignant tumor of urinary bladder (188 9) (C67 9)   31  Microhematuria (599 72) (R31 29)   32  Need for pneumococcal vaccination (V03 82) (Z23)   33  Need for prophylactic vaccination and inoculation against influenza (V04 81) (Z23)   34  Occult blood positive stool (792 1) (R19 5)   35  Organic impotence (607 84) (N52 9)   36  Other seasonal allergic rhinitis (477 8) (J30 2)   37  Pedal edema (782 3) (R60 0)   38  Peptic ulcer (533 90) (K27 9)   39  Preop examination (V72 84) (Z01 818)   40  Preop examination (V72 84) (Z01 818)   41  Pre-procedure lab exam (V72 63) (Z01 812)   42  Screening for genitourinary condition (V81 6) (Z13 89)   43  Screening for neurological condition (V80 09) (Z13 89)   44  Sleep apnea (780 57) (G47 30)   45  Thrombocytopenia (287 5) (D69 6)   46  Tinnitus of both ears (388 30) (H93 13)   47  Type 2 diabetes mellitus with hyperglycemia (250 00) (E11 65)   48  Urinary tract infection (599 0) (N39 0)   49  Vitamin D deficiency (268 9) (E55 9)    Current Meds   1  Accu-Chek Martha Plus In Vitro Strip; TEST 3 TIMES DAILY; Therapy: 46GPM6725 to (Evaluate:52Jep7710)  Requested for: 63VBK6230; Last   Rx:14Idc9777 Ordered   2  Aspirin 81 MG TABS; TAKE 1 TABLET DAILY; Therapy: 90Qvt9678 to Recorded   3  Azelastine HCl - 0 15 % Nasal Solution; INHALE 1 SPRAY Intranasally Twice daily; Therapy: 54ZLM5752 to (Evaluate:11Mar2016)  Requested for: 73IGZ6235; Last   Rx:96Vpa8988 Ordered   4  BD Insulin Syringe Ultrafine 31G X 5/16" 0 3 ML Miscellaneous; USE AS DIRECTED; Therapy: 82VVL4886 to (Loi Romero)  Requested for: 72EVZ9664;  Last   Rx:16Uox0237 Ordered 5  BD Pen Needle Short U/F 31G X 8 MM Miscellaneous; USE AND DISCARD 2 PEN        NEEDLES DAILY; Therapy: 00GCX3736 to (Evaluate:13Jun2016)  Requested for: 76PVK4758; Last   Rx:26Vus2180 Ordered   6  Fluocinonide 0 05 % External Ointment; APPLY SPARINGLY TO AFFECTED AREA(S)   TWICE DAILY; Therapy: 18DBH3351 to (Evaluate:25Mar2014)  Requested for: 91OMV3790; Last   Rx:24Jan2014 Ordered   7  Fluticasone Propionate 50 MCG/ACT Nasal Suspension; use 2 sprays in each nostril   once daily; Therapy: 74QGU2421 to (Evaluate:18Jan2015)  Requested for: 91SRB0420; Last   Rx:19Nov2014 Ordered   8  Furosemide 20 MG Oral Tablet; TAKE ONE TABLET BY MOUTH EVERY DAY AS   DIRECTED; Therapy: 04ARR6567 to  Requested for: 35SYN2912 Recorded   9  Gabapentin 300 MG Oral Capsule; TAKE 2 CAPSULES AT BEDTIME NIGHTLY; Therapy: 17XVB2117 to (Evaluate:14Nov2016)  Requested for: 55MZH0939; Last   Rx:18Feb2016 Ordered   10  Isosorbide Mononitrate ER 30 MG Oral Tablet Extended Release 24 Hour; take 1 tablet    by mouth every day; Therapy: 97CDW5112 to (([phone])  Requested for: 13Gcg1897; Last    Rx:51Mcp1157 Ordered   11  Lantus SoloStar 100 UNIT/ML Subcutaneous Solution Pen-injector; INJECT 55 UNITS    AT BEDTIME; Therapy: 09CPQ8655 to Recorded   12  Loperamide HCl - 2 MG Oral Capsule; TAKE 1 CAPSULE BY MOUTH three times DAILY    prn; Therapy: 16AAP0785 to (Amparo Reddy)  Requested for: 58AXA1699; Last    Rx:22Qdi2135 Ordered   15  Lumigan 0 01 % Ophthalmic Solution; Therapy: 07OES7978 to Recorded   14  Metoprolol Succinate  MG Oral Tablet Extended Release 24 Hour; TAKE 1    TABLET DAILY; Therapy: 83YIN8001 to (Jd Bernstein)  Requested for: 42RZL6380; Last    Rx:03Nov2015 Ordered   15  Multivitamins TABS; TAKE 1 TABLET DAILY; Therapy: 56Qgg1375 to Recorded   16  NovoLOG FlexPen 100 UNIT/ML Subcutaneous Solution Pen-injector; Use 15 units TID;     Therapy: 01Apr2016 to (Evaluate:41Iqr9203) Requested for: 55QEP9704 Recorded   17  OneTouch Ultra Blue In [employer]; Therapy: 55IBA0075 to (Last Rx:13Oct2011)  Requested for: 13Oct2011 Ordered   18  Pepto-Bismol 262 MG Oral Tablet Chewable; one tablet every 4-6 hours when necessary    for diarrhea loose stools; Therapy: 21DLT7122 to (Last Rx:42Rfz0195)  Requested for: 37KYA7256 Ordered   19  Ranitidine HCl - 150 MG Oral Capsule; Take 1 capsule twice daily; Therapy: 70CLF4413 to (Last Rx:06Oct2016)  Requested for: 12EON4063 Ordered   20  SLPG Compound Medication; Bimix: Papaverine 30 mg/ Phentolamine 0 5 mg/ ml;    Therapy: 09WOE1168 to (Last Rx:13Nov2014) Ordered   21  Vitamin D3 27208 UNIT Oral Capsule; TAKE 1 TABLET EVERY TWO WEEKS; Therapy: 01EOD9284 to (Evaluate:01Mar2017)  Requested for: 83ZOW1882; Last    Rx:36Nyo9629 Ordered    Allergies    1  HumaLOG SOLN   2  Lipitor TABS   3  Simvastatin TABS   4   Adhesive Tape TAPE    Signatures   Electronically signed by : Orren Bamberger, RN; Oct 10 2016  3:43PM EST                       (Author)

## 2018-01-10 NOTE — MISCELLANEOUS
Assessment    1  Type 2 diabetes mellitus with hyperglycemia (250 00) (E11 65)   2  Diabetic ulcer of right foot associated with type 2 diabetes mellitus (250 80,707 15)   (E11 621,L97 519)   3  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,440 9) (I25 10)   4  Hypertension (401 9) (I10)   5  Malignant tumor of urinary bladder (188 9) (C67 9)   6  Generalized osteoarthritis of multiple sites (715 09) (M15 9)   7  History of Cath Stent Placement    Plan  Type 2 diabetes mellitus with hyperglycemia    · (1) HEMOGLOBIN A1C; Status:Active; Requested BFL:[id number];    Perform:[facility]; GVY:52YKL6543; Ordered; For:Type 2 diabetes mellitus with hyperglycemia; Ordered By:Daniele Gonzalez;  Vitamin D deficiency    · Vitamin D3 51950 UNIT Oral Capsule; TAKE   3    tablets per month   Rx By: Allison Diaz; Dispense: 90 Days ; #:1 X 12 Capsule Bottle; Refill: 1; For: Vitamin D deficiency; FRANDY = N; Verified Transmission to [facility] #[id number] Last Updated By: System, [employer]; 3/9/2017 4:16:49 PM    Discussion/Summary  Discussion Summary: S? P insition and drainage diabetic foot infection and ulcer Ivac treatment followed by podiatry    Diabetes poorly controlled hemoglobin A1c over 9 we'll recheck when he comes back    Blood pressure control    Coronary artery disease stable    Bladder cancer being followed by urology and no evidence of glucose hematuria  Goals and Barriers:  The patient has the current Goals: Diabetes needs to be better control insulin dosage reviewed no episodes of hypoglycemia we will see him in 6 weeks  Coronary artery disease stable no angina  Blood pressure control blood pressure less than 140/90 the goal should be less than 130/80 when she has reach next  Continue follow-up with the podiatry concerning his incision and drainage post cellulitis and wound infection of the right foot after stepping on a nail  Medication SE Review and Pt Understands Tx: Possible side effects of new medications were reviewed with the patient/guardian today  The treatment plan was reviewed with the patient/guardian  The patient/guardian understands and agrees with the treatment plan      Chief Complaint  Chief Complaint Free Text Note Form: [facility] for abscess and cellulitis of right foot  He was given cephalexin & Percocet at discharge  He is not using Metformin  History of Present Illness  TCM Communication [facility]: The patient is being contacted for follow-up after hospitalization  Hospital records were reviewed  He was discharged to home  Medications reviewed and updated today  Symptoms: no fever and no weakness  The patient is currently asymptomatic  Communication performed and completed by   HPI: She came in posthospitalization diabetic foot infection was admitted to [facility] trigger was a puncture wound 2 weeks prior noted the thyroid foot to be swelling and cellulitic report of her having fevers and chills over the past week was admitted to the podiatry service on February 22 MRI showed no bony infection MRI did not show an abscess IV antibiotic was started with cephalexin and Flagyl was taken to the OR for incision and drainage and wound VAC application of the right foot blood cultures were negative    He is here today with his wife he has nonweightbearing he has walker with wheels at home and he is in a wheelchair he just came back from wound care    Random glucose was 125 on March 2  Hemoglobin plummeted 37 white count 8000 platelet count 785,921  Sodium 136 potassium 4 4 chloride 99 CO2 30 B  19 creatinine 1 2 calcium 8 5  Ulcers from the wound grew beta-hemolytic Streptococcus group B  Initial white count on admission was 14,000 that has normalized  Hemoglobin A1c done in February 22 of this year was 9 2  Sedimentation rate was 62  Tetanus antitoxin antibody IgG was 5 6  Chest x-ray showed no active disease  X-ray of the right foot show no abnormality or opaque foreign material  MRI of the right foot show first intermetatarsal space skin and subcutaneous D3-containing emphysema penetrating trauma surrounding reactive subcutaneous edema indicating a cellulitis no fluid collection identified no abscess or osteomyelitis  Diffuse degenerative changes throughout the visualized forefoot    EKG sinus rhythm no acute changes  Review of Systems  Complete-Male:   Constitutional: No fever or chills, feels well, no tiredness, no recent weight gain or weight loss  Eyes: No complaints of eye pain, no red eyes, no discharge from eyes, no itchy eyes  ENT: no complaints of earache, no hearing loss, no nosebleeds, no nasal discharge, no sore throat, no hoarseness  Cardiovascular: No complaints of slow heart rate, no fast heart rate, no chest pain, no palpitations, no leg claudication, no lower extremity  Respiratory: No complaints of shortness of breath, no wheezing, no cough, no SOB on exertion, no orthopnea or PND  Gastrointestinal: No complaints of abdominal pain, no constipation, no nausea or vomiting, no diarrhea or bloody stools  Genitourinary: No complaints of dysuria, no incontinence, no hesitancy, no nocturia, no genital lesion, no testicular pain  Musculoskeletal: No complaints of arthralgia, no myalgias, no joint swelling or stiffness, no limb pain or swelling  Integumentary: No complaints of skin rash or skin lesions, no itching, no skin wound, no dry skin  Neurological: No compliants of headache, no confusion, no convulsions, no numbness or tingling, no dizziness or fainting, no limb weakness, no difficulty walking  Psychiatric: Is not suicidal, no sleep disturbances, no anxiety or depression, no change in personality, no emotional problems  Endocrine: No complaints of proptosis, no hot flashes, no muscle weakness, no erectile dysfunction, no deepening of the voice, no feelings of weakness     Hematologic/Lymphatic: No complaints of swollen glands, no swollen glands in the neck, does not bleed easily, no easy bruising  Active Problems     1  Abrasion Of Left Foot (917 0)   2  Abrasion of leg (916 0) (S80 819A)   3  Anemia (285 9) (D64 9)   4  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,440 9) (I25 10)   5  BPH without obstruction/lower urinary tract symptoms (600 00) (N40 0)   6  Chest discomfort (786 59) (R07 89)   7  Chronic heel ulcer (707 14) (L97 409)   8  Depression (311) (F32 9)   9  Diabetes mellitus type 2, uncontrolled (250 02) (E11 65)   10  Diarrhea (787 91) (R19 7)   11  Diverticulosis (562 10) (K57 90)   12  Dyspnea (786 09) (R06 00)   13  Dysuria (788 1) (R30 0)   14  Eczema (692 9) (L30 9)   15  Elevated ALT measurement (790 4) (R74 0)   16  Elevated AST (SGOT) (790 4) (R74 0)   17  Encounter for screening colonoscopy (V76 51) (Z12 11)   18  Fatty liver (571 8) (K76 0)   19  Foot pain (729 5) (M79 673)   20  Generalized osteoarthritis of multiple sites (715 09) (M15 9)   21  GERD without esophagitis (530 81) (K21 9)   22  Glaucoma screening (V80 1) (Z13 5)   23  Gross hematuria (599 71) (R31 0)   24  Hyperlipidemia (272 4) (E78 5)   25  Hypertension (401 9) (I10)   26  Hypoglycemia (251 2) (E16 2)   27  Hypotension (458 9) (I95 9)   28  Insomnia (780 52) (G47 00)   29  Lower back pain (724 2) (M54 5)   30  Lumbar spondylosis (721 3) (M47 816)   31  Malignant tumor of urinary bladder (188 9) (C67 9)   32  Microhematuria (599 72) (R31 29)   33  Need for pneumococcal vaccination (V03 82) (Z23)   34  Need for prophylactic vaccination and inoculation against influenza (V04 81) (Z23)   35  Occult blood positive stool (792 1) (R19 5)   36  Organic impotence (607 84) (N52 9)   37  Other seasonal allergic rhinitis (477 8) (J30 2)   38  Pedal edema (782 3) (R60 0)   39  Peptic ulcer (533 90) (K27 9)   40  Preop examination (V72 84) (Z01 818)   41  Preop examination (V72 84) (Z01 818)   42  Pre-procedure lab exam (V72 63) (Z01 812)   43   Screening for genitourinary condition (V81 6) (Z13 89)   44  Screening for neurological condition (V80 09) (Z13 89)   45  Sleep apnea (780 57) (G47 30)   46  Thrombocytopenia (287 5) (D69 6)   47  Tinnitus of both ears (388 30) (H93 13)   48  Urinary tract infection (599 0) (N39 0)   49  Vitamin D deficiency (268 9) (E55 9)    Type 2 diabetes mellitus with hyperglycemia (250 00) (E11 65)       Diabetic neuropathy, type II diabetes mellitus (250 60) (E11 40)          Past Medical History    1  History of Bladder Cancer (V10 51)   2  History of acute sinusitis (V12 69) (Z87 09)   3  History of transient cerebral ischemia (V12 54) (Z86 73)   4  History of urinary tract infection (V13 02) (Z87 440)   5  History of viral gastroenteritis (V12 09) (Z86 19)   6  Need for prophylactic vaccination and inoculation against influenza (V04 81) (Z23)    Surgical History    1  History of Cath Stent Placement   2  History of Cystoscopy With Biopsy   3  History of Cystourethroscopy   4  History of Diagnostic Cystoscopy   5  History of Gastric Surgery For Morbid Obesity Bypass With Shaji-en-Y   6  History of Interventional Cardiac Catheterization   7  History of Intestinal Surgery Shaji-en-Y   8  History of Knee Arthroplasty    Family History  Mother    1  Family history of Diabetes   2  Family history of Heart disease   3  Family history of High blood pressure  Father    4  Family history of Heart disease  Sister    5  Family history of Diabetes   6  Family history of High blood pressure   7  Family history of Kidney disease  Brother    6  Family history of Heart disease   9  Family history of High blood pressure  Family History Reviewed: The family history was reviewed and updated today  Social History    · Former smoker (I42 38) (Q50 042)    Current Meds   1  Accu-Chek Martha Plus In Vitro Strip; TEST 3 TIMES DAILY; Therapy: 17RVZ4968 to (Evaluate:19Zqt1109)  Requested for: 72YIL0905; Last   Rx:93Lii6054 Ordered   2   Aspirin 81 MG TABS; TAKE 1 TABLET DAILY; Therapy: 80Oqy5364 to Recorded   3  Azelastine HCl - 0 15 % Nasal Solution; INHALE 1 SPRAY Intranasally Twice daily; Therapy: 59VSE4400 to (Evaluate:11Mar2016)  Requested for: 06UXH9441; Last   Rx:10Feb2016 Ordered   4  BD Insulin Syringe Ultrafine 31G X 5/16" 0 3 ML Miscellaneous; USE AS DIRECTED; Therapy: 00VGH9644 to (490 08 485)  Requested for: 25UYB9670; Last   Rx:72Kow9555 Ordered   5  BD Pen Needle Short U/F 31G X 8 MM Miscellaneous; inject insulin five times daily; Therapy: 45ORE1393 to (Evaluate:61Kcy0530)  Requested for: 55XPC4237; Last   Rx:18Nov2016 Ordered   6  Fluocinonide 0 05 % External Ointment; APPLY SPARINGLY TO AFFECTED AREA(S)   TWICE DAILY; Therapy: 52CBJ9036 to (Evaluate:25Mar2014)  Requested for: 33AEB3608; Last   Rx:24Jan2014 Ordered   7  Fluticasone Propionate 50 MCG/ACT Nasal Suspension; use 2 sprays in each nostril   once daily; Therapy: 76SPU2128 to (Evaluate:18Jan2015)  Requested for: 91NXJ6623; Last   Rx:19Nov2014 Ordered   8  Furosemide 20 MG Oral Tablet; TAKE ONE TABLET BY MOUTH EVERY DAY AS   DIRECTED; Therapy: 81ATV8341 to  Requested for: 60VKW1935 Recorded   9  Gabapentin 300 MG Oral Capsule; TAKE 2 CAPSULES AT BEDTIME NIGHTLY; Therapy: 56SGU9691 to (Evaluate:18Upg8640)  Requested for: 06CUG4168; Last   Rx:28Nov2016 Ordered   10  Isosorbide Mononitrate ER 30 MG Oral Tablet Extended Release 24 Hour; take 1 tablet    by mouth every day; Therapy: 46JQU6906 to (Bharath Rowland)  Requested for: 84APA9630; Last    Rx:05Jan2017 Ordered   11  Lantus SoloStar 100 UNIT/ML Subcutaneous Solution Pen-injector; Injected 55 units    subcutaneous at bedtime; Therapy: 19OCG8031 to (Evaluate:32Fvx4703)  Requested for: 19GUB8828 Recorded   12  Loperamide HCl - 2 MG Oral Capsule; TAKE 1 CAPSULE BY MOUTH three times DAILY    prn; Therapy: 16IOP1489 to (Derril David)  Requested for: 44GCJ6178; Last    Rx:87Vmp4952 Ordered   15   Lumigan 0 01 % Ophthalmic Solution; Therapy: 01KEP0917 to Recorded   14  Metoprolol Succinate  MG Oral Tablet Extended Release 24 Hour; TAKE 1 TABLET    DAILY; Therapy: 98QXL9481 to (Laurence Bermudez)  Requested for: 90EQX5416; Last    Rx:03Nov2015 Ordered   15  Multivitamins TABS; TAKE 1 TABLET DAILY; Therapy: 44Ezw1150 to Recorded   16  NovoLOG FlexPen 100 UNIT/ML Subcutaneous Solution Pen-injector; Use 15 units TID; Therapy: 65Uoi0553 to (Evaluate:94Ipr8530)  Requested for: 51FHS1864 Recorded   17  Omeprazole 20 MG Oral Capsule Delayed Release; TAKE 1 CAPSULE Daily; Therapy: 04GFZ5940 to (Evaluate:21Jun2017)  Requested for: 29Nly5427; Last    Rx:02Ggb9523 Ordered   18  OneTouch Ultra Blue In [employer]; Therapy: 90ZKI1681 to (Last Rx:13Oct2011)  Requested for: 13Oct2011 Ordered   19  Pepto-Bismol 262 MG Oral Tablet Chewable; one tablet every 4-6 hours when necessary    for diarrhea loose stools; Therapy: 14MTJ1938 to (Last Rx:38Dmz5674)  Requested for: 94OCN1133 Ordered   20  RaNITidine HCl - 150 MG Oral Capsule; Take 1 capsule twice daily; Therapy: 47ZUS8962 to (Last Rx:06Oct2016)  Requested for: 69KPE3290 Ordered   21  SLPG Compound Medication; Bimix: Papaverine 30 mg/ Phentolamine 0 5 mg/ ml;    Therapy: 59BYX7105 to (Last Rx:13Nov2014) Ordered   22  Vitamin D3 17892 UNIT Oral Capsule; TAKE 1 TABLET EVERY TWO WEEKS; Therapy: 72LWL6190 to (Evaluate:78Had3021)  Requested for: 89VHP5915; Last    Rx:22Gsa8975 Ordered  Medication List Reviewed: The medication list was reviewed and updated today  Allergies    1  HumaLOG SOLN   2  Lipitor TABS   3  Simvastatin TABS   4  Adhesive Tape TAPE    Vitals  Signs   Recorded: 24MTK9275 03:41PM   Temperature: 97 5 F  Heart Rate: 74  Respiration: 15  Systolic: 857  Diastolic: 76  Height: 6 ft 4 in  Weight: 244 lb   BMI Calculated: 29 7  BSA Calculated: 2 41    Pressure taken by me 106/70 left arm sitting       Physical Exam    Constitutional General appearance: Abnormal   chronically ill and overweight  Eyes   Conjunctiva and lids: No swelling, erythema, or discharge  Pupils and irises: Equal, round and reactive to light  Ears, Nose, Mouth, and Throat   Oropharynx: Normal with no erythema, edema, exudate or lesions  Pulmonary   Respiratory effort: No increased work of breathing or signs of respiratory distress  Auscultation of lungs: Clear to auscultation, equal breath sounds bilaterally, no wheezes, no rales, no rhonci  Cardiovascular   Palpation of heart: Normal PMI, no thrills  Auscultation of heart: Abnormal   The rhythm was regular  Heart sounds: normal S1, normal S2 and no gallop heard  Examination of extremities for edema and/or varicosities: Normal     Abdomen   Abdomen: Non-tender, no masses  Liver and spleen: No hepatomegaly or splenomegaly  Lymphatic   Palpation of lymph nodes in neck: No lymphadenopathy  Musculoskeletal   Gait and station: Abnormal   Gait evaluation demonstrated insufficiencies for exercise testing  Skin   Examination of the skin for lesions: Abnormal   Small A wound was noted  on the right foot  Psychiatric   Orientation to person, place and time: Normal     Mood and affect: Normal          Future Appointments    Date/Time Provider Specialty Site   04/24/2017 08:15 AM FLORESITA Khalil   Internal Medicine [facility]   03/16/2017 01:15 PM Adrian Armstrong, [address]     Signatures   Electronically signed by : FLORESITA Braun ; Mar  9 2017  4:41PM EST                       (Author)

## 2018-01-10 NOTE — MISCELLANEOUS
Message      Date: 01 Apr 2016 1:49 PM EST, Recorded By: Zeyad Lynne   Calling For: Seven Montez   Caller: Sampson Myers, Self   Phone: [phone] (Home)   Reason: Medical Complaint   has had diarrhea off & on since January  went to [place] clinic 3/21/16, labs and stool collected  phone call from [place] today, Hemoccult was positive for blood  labs are scanned, given home Hemoccult today and an appointment with Dr Ina Henson (GI) 4/7/16 to schedule colonoscopy  Active Problems    1  Abrasion Of Left Foot (917 0)   2  Abrasion of leg (916 0) (S80 819A)   3  Acute sinusitis (461 9) (J01 90)   4  Anemia (285 9) (D64 9)   5  Atherosclerotic heart disease of native coronary artery without angina pectoris (414 01)   (I25 10)   6  BPH without obstruction/lower urinary tract symptoms (600 00) (N40 0)   7  Chest discomfort (786 59) (R07 89)   8  Depression (311) (F32 9)   9  Diabetes mellitus type 2, uncontrolled (250 02) (E11 65)   10  Diabetic neuropathy, type II diabetes mellitus (250 60,357 2) (E11 40)   11  Diarrhea (787 91) (R19 7)   12  Diverticulosis (562 10) (K57 90)   13  Dyspnea (786 09) (R06 00)   14  Dysuria (788 1) (R30 0)   15  Eczema (692 9) (L30 9)   16  Elevated ALT measurement (790 4) (R74 0)   17  Elevated AST (SGOT) (790 4) (R74 0)   18  Encounter for screening colonoscopy (V76 51) (Z12 11)   19  Fatty liver (571 8) (K76 0)   20  Foot pain (729 5) (M79 673)   21  Generalized osteoarthritis of multiple sites (715 09) (M15 9)   22  GERD without esophagitis (530 81) (K21 9)   23  Gross hematuria (599 71) (R31 0)   24  Hyperlipidemia (272 4) (E78 5)   25  Hypertension (401 9) (I10)   26  Hypoglycemia (251 2) (E16 2)   27  Hypotension (458 9) (I95 9)   28  Insomnia (780 52) (G47 00)   29  Malignant tumor of urinary bladder (188 9) (C67 9)   30  Microhematuria (599 72) (R31 2)   31  Need for pneumococcal vaccination (V03 82) (Z23)   32   Need for prophylactic vaccination and inoculation against influenza (V04 81) (Z23)   33  Organic impotence (607 84) (N52 9)   34  Other seasonal allergic rhinitis (477 8) (J30 2)   35  Pedal edema (782 3) (R60 0)   36  Peptic ulcer (533 90) (K27 9)   37  Preop examination (V72 84) (Z01 818)   38  Screening for genitourinary condition (V81 6) (Z13 89)   39  Screening for neurological condition (V80 09) (Z13 89)   40  Sleep apnea (780 57) (G47 30)   41  Thrombocytopenia (287 5) (D69 6)   42  Tinnitus of both ears (388 30) (H93 13)   43  Type 2 diabetes mellitus with hyperglycemia (250 00) (E11 65)   44  Urinary tract infection (599 0) (N39 0)   45  Viral gastroenteritis (008 8) (A08 4)   46  Vitamin D deficiency (268 9) (E55 9)    Current Meds   1  Accu-Chek Martha Plus In Vitro Strip; TEST 3 TIMES DAILY; Therapy: 83OEU1796 to (Evaluate:77Nuu5359)  Requested for: 84KSM1531; Last   Rx:88Isc3770 Ordered   2  Aspirin 81 MG Oral Tablet; TAKE 1 TABLET DAILY; Therapy: 97Yqs6146 to Recorded   3  Azelastine HCl - 0 15 % Nasal Solution; INHALE 1 SPRAY Intranasally Twice daily; Therapy: 22EPQ0293 to (Evaluate:11Mar2016)  Requested for: 85RNP3878; Last   Rx:77Qut0822 Ordered   4  BD Insulin Syringe Ultrafine 31G X 5/16" 0 3 ML Miscellaneous; USE AS DIRECTED; Therapy: 53BSN0493 to (Rachael Ny)  Requested for: 86WPV0099; Last   Rx:51Ewi3052 Ordered   5  BD Pen Needle Short U/F 31G X 8 MM Miscellaneous; USE AND DISCARD 2 PEN        NEEDLES DAILY; Therapy: 00HPL5200 to (Evaluate:13Jun2016)  Requested for: 60VAF2787; Last   Rx:87Ndd9406 Ordered   6  Fluocinonide 0 05 % External Ointment; APPLY SPARINGLY TO AFFECTED AREA(S)   TWICE DAILY; Therapy: 98SUZ0379 to (Evaluate:25Mar2014)  Requested for: 75JEG8219; Last   Rx:24Jan2014 Ordered   7  Fluticasone Propionate 50 MCG/ACT Nasal Suspension; use 2 sprays in each nostril   once daily; Therapy: 28VUX2255 to (Evaluate:18Jan2015)  Requested for: 28RUI4806; Last   Rx:19Nov2014 Ordered   8   Furosemide 20 MG Oral Tablet; TAKE ONE TABLET BY MOUTH EVERY DAY AS   DIRECTED; Therapy: 48CFM4821 to  Requested for: 72PXN6394 Recorded   9  Gabapentin 300 MG Oral Capsule; TAKE 2 CAPSULES AT BEDTIME NIGHTLY; Therapy: 52QFX0780 to (Evaluate:14Nov2016)  Requested for: 24VBB9276; Last   Rx:67Hog9499 Ordered   10  Hydrocodone-Acetaminophen 5-325 MG Oral Tablet; Therapy: 02GHH5350 to Recorded   11  Isosorbide Mononitrate ER 30 MG Oral Tablet Extended Release 24 Hour; TAKE 1    TABLET DAILY; Therapy: 10LEE0278 to (Silvio Haney)  Requested for: 62IXF4560; Last    Rx:03Nov2015 Ordered   12  Lumigan 0 01 % Ophthalmic Solution; Therapy: 84UNH7499 to Recorded   13  Metoprolol Succinate  MG Oral Tablet Extended Release 24 Hour; TAKE 1    TABLET DAILY; Therapy: 21ZRD0734 to (Anuj Bowen)  Requested for: 13IAW1786; Last    Rx:03Nov2015 Ordered   14  Multivitamins TABS; TAKE 1 TABLET DAILY; Therapy: 04Bsr0414 to Recorded   15  NovoLOG FlexPen 100 UNIT/ML Subcutaneous Solution Pen-injector; Use 18 units TID; Therapy: 36Mye4961 to (Anuj Bowen)  Requested for: 17Lgo1921; Last    Rx:01Apr2016 Ordered   16  Omeprazole 20 MG Oral Capsule Delayed Release; TAKE 1 CAPSULE Daily; Therapy: 64PWQ0113 to (Evaluate:15Jun2016)  Requested for: 28FKT6752; Last    Rx:77Wei4324 Ordered   17  OneTouch Ultra Blue In [employer]; Therapy: 34MGR1229 to (Last Rx:13Oct2011)  Requested for: 13Oct2011 Ordered   18  Pepto-Bismol 262 MG Oral Tablet Chewable; one tablet every 4-6 hours when necessary    for diarrhea loose stools; Therapy: 47MFW3642 to (Last Rx:45Ynf4985)  Requested for: 56HSU6949 Ordered   19  SLPG Compound Medication; Bimix: Papaverine 30 mg/ Phentolamine 0 5 mg/ ml;    Therapy: 89YNN0090 to (Last Rx:13Nov2014) Ordered   20  Toujeo SoloStar 300 UNIT/ML Subcutaneous Solution Pen-injector; 60 units at 10 pm;    Therapy: 89RYC5846 to (Evaluate:14Pwc0550)  Requested for: 78XJJ7253; Last    Rx:05Jan2016 Ordered   21   TraMADol HCl - 50 MG Oral Tablet; take 1/2 - 1 tablet tid prn; Therapy: 39JAJ9725 to (Evaluate:13Jan2013)  Requested for: 23CTF7420; Last    Rx:14Nov2012 Ordered   22  Vitamin D3 42272 UNIT Oral Capsule; TAKE 1 TABLET EVERY TWO WEEKS; Therapy: 60AOK1925 to (Evaluate:10Aug2016)  Requested for: 18GZS7163 Recorded    Allergies    1  HumaLOG SOLN   2  Lipitor TABS   3  Simvastatin TABS   4   Adhesive Tape TAPE    Signatures   Electronically signed by : FLORESITA Castaneda ; Apr 4 2016  7:17AM EST

## 2018-01-11 NOTE — MISCELLANEOUS
Message       Date: 20 Apr 2017 8:47 AM EST, Recorded By: Claire Oakley For: Emma Chatterjee   Caller: Rebekah/ANDREA, Care Coordinator   Reason: General Medical Question   PC from [facility] #[phone], 4/18/17, re: Blood Sugars of Mr Nolan Gomez having been very low 2x's in the last 3 wks  He did pass out after 1 episode  Both instances occurred between breakfast & lunch  Pt had visitors & had not eaten lunch as yet  [facility] reports that he is not keeping track of his BS readings routinely  I did let her know we will need #'s  She will try to get him to log appropriately  His Glucometer will have readings recorded & she will have him bring to OV  His fasting sugars are good per [facility], range 114-140  I spoke to Dr Pawel Anton & since his A1C was 9 2 in Feb  He did not feel that he would make any changes at this time with Insulin  He wanted me to inform Pt that he needs to eat 3 meals daily, track his BS on a regular basis, & let us know if he has any other issues until his OV Mon 4/24  Rebekah/SHANT & PT notified of the above & understood  Active Problems    1  Abrasion Of Left Foot (917 0)   2  Abrasion of leg (916 0) (S80 819A)   3  Anemia (285 9) (D64 9)   4  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,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) (I25 10)   5  BPH without obstruction/lower urinary tract symptoms (600 00) (N40 0)   6  Chest discomfort (786 59) (R07 89)   7  Chronic heel ulcer (707 14) (L97 409)   8  Depression (311) (F32 9)   9  Diabetes mellitus type 2, uncontrolled (250 02) (E11 65)   10  Diabetic neuropathy, type II diabetes mellitus (250 60,357 2) (E11 40)   11  Diabetic ulcer of right foot associated with type 2 diabetes mellitus (250 80,707 15)    (E11 621,L97 519)   12  Diarrhea (787 91) (R19 7)   13  Diverticulosis (562 10) (K57 90)   14  Dyspnea (786 09) (R06 00)   15  Dysuria (788 1) (R30 0)   16  Eczema (692 9) (L30 9)   17  Elevated ALT measurement (790 4) (R74 0)   18  Elevated AST (SGOT) (790 4) (R74 0)   19   Encounter for screening colonoscopy (V76 51) (Z12 11)   20  Fatty liver (571 8) (K76 0)   21  Foot pain (729 5) (M79 673)   22  Generalized osteoarthritis of multiple sites (715 09) (M15 9)   23  GERD without esophagitis (530 81) (K21 9)   24  Glaucoma screening (V80 1) (Z13 5)   25  Gross hematuria (599 71) (R31 0)   26  Hyperlipidemia (272 4) (E78 5)   27  Hypertension (401 9) (I10)   28  Hypoglycemia (251 2) (E16 2)   29  Hypotension (458 9) (I95 9)   30  Insomnia (780 52) (G47 00)   31  Lower back pain (724 2) (M54 5)   32  Lumbar spondylosis (721 3) (M47 816)   33  Malignant tumor of urinary bladder (188 9) (C67 9)   34  Microhematuria (599 72) (R31 29)   35  Need for pneumococcal vaccination (V03 82) (Z23)   36  Need for prophylactic vaccination and inoculation against influenza (V04 81) (Z23)   37  Non-healing surgical wound, initial encounter (998 83) (T81 89XA)   38  Non-healing surgical wound, subsequent encounter (V58 89,998 83) (T81 89XD)   39  Occult blood positive stool (792 1) (R19 5)   40  Organic impotence (607 84) (N52 9)   41  Other seasonal allergic rhinitis (477 8) (J30 2)   42  Pedal edema (782 3) (R60 0)   43  Peptic ulcer (533 90) (K27 9)   44  Preop examination (V72 84) (Z01 818)   45  Preop examination (V72 84) (Z01 818)   46  Pre-procedure lab exam (V72 63) (Z01 812)   47  Puncture wound of right foot, subsequent encounter (V58 89,892 0) (S91 331D)   48  Screening for genitourinary condition (V81 6) (Z13 89)   49  Screening for neurological condition (V80 09) (Z13 89)   50  Sleep apnea (780 57) (G47 30)   51  Thrombocytopenia (287 5) (D69 6)   52  Tinnitus of both ears (388 30) (H93 13)   53  Type 2 diabetes mellitus with hyperglycemia (250 00) (E11 65)   54  Ulcer On The Feet Dorsal Gonzalez Scale 3  (0-5)   55  Urinary tract infection (599 0) (N39 0)   56  Vitamin D deficiency (268 9) (E55 9)    Current Meds   1  Accu-Chek Amrtha Plus In Vitro Strip; TEST 3 TIMES DAILY;    Therapy: 70IUI8321 to (Evaluate:23Aug2015)  Requested for: 92VOH1431; Last   Rx:24Feb2015 Ordered   2  Aspirin 81 MG TABS; TAKE 1 TABLET DAILY; Therapy: 81Ccn9953 to Recorded   3  Azelastine HCl - 0 15 % Nasal Solution; INHALE 1 SPRAY Intranasally Twice daily; Therapy: 28FFB3545 to (Evaluate:11Mar2016)  Requested for: 88PTS9453; Last   Rx:10Feb2016 Ordered   4  BD Insulin Syringe Ultrafine 31G X 5/16" 0 3 ML Miscellaneous; USE AS DIRECTED; Therapy: 99UTN1573 to ([phone])  Requested for: 39GKD2809; Last   Rx:51Dct4763 Ordered   5  BD Pen Needle Short U/F 31G X 8 MM Miscellaneous; inject insulin five times daily; Therapy: 22AEN7916 to (Evaluate:56Eii1507)  Requested for: 26TGQ5104; Last   Rx:06Apr2017 Ordered   6  Cephalexin 500 MG Oral Capsule; TAKE 1 CAPSULE 4 TIMES DAILY UNTIL GONE;   Therapy: 58UXB9906 to (Evaluate:09Apr2017)  Requested for: 12JTI7153; Last   Rx:30Mar2017 Ordered   7  Fluocinonide 0 05 % External Ointment; APPLY SPARINGLY TO AFFECTED AREA(S)   TWICE DAILY; Therapy: 66ETH9253 to (Evaluate:25Mar2014)  Requested for: 44CRH9759; Last   Rx:24Jan2014 Ordered   8  Fluticasone Propionate 50 MCG/ACT Nasal Suspension; use 2 sprays in each nostril   once daily; Therapy: 09ULO5854 to (Evaluate:18Jan2015)  Requested for: 93IUH8678; Last   Rx:19Nov2014 Ordered   9  Furosemide 20 MG Oral Tablet; TAKE ONE TABLET BY MOUTH EVERY DAY AS   DIRECTED; Therapy: 49TZS6822 to  Requested for: 23YRT3292 Recorded   10  Gabapentin 300 MG Oral Capsule; TAKE 2 CAPSULES AT BEDTIME NIGHTLY; Therapy: 99KYT9696 to (Evaluate:03Sep2017)  Requested for: 81UKH9189; Last    Rx:07Mar2017 Ordered   11  Isosorbide Mononitrate ER 30 MG Oral Tablet Extended Release 24 Hour; take 1 tablet    by mouth every day; Therapy: 34LPW6347 to (Riana Galvez)  Requested for: 14DLR6105; Last    Rx:05Jan2017 Ordered   12  Lantus SoloStar 100 UNIT/ML Subcutaneous Solution Pen-injector; Injected 55 units    subcutaneous at bedtime;     Therapy: 42SRH4961 to (Evaluate:58Mso4476)  Requested for: 17RWJ8908 Recorded   13  Loperamide HCl - 2 MG Oral Capsule; TAKE 1 CAPSULE BY MOUTH three times DAILY    prn; Therapy: 30LHW7656 to (Lupillo Callaway)  Requested for: 66BEH7524; Last    Rx:06Oct2016 Ordered   15  Lumigan 0 01 % Ophthalmic Solution; Therapy: 22HNV3837 to Recorded   15  Metoprolol Succinate  MG Oral Tablet Extended Release 24 Hour; TAKE 1    TABLET DAILY; Therapy: 32ZRU6727 to (Everett Najera)  Requested for: 79GMY8415; Last    Rx:03Nov2015 Ordered   16  Multivitamins TABS; TAKE 1 TABLET DAILY; Therapy: 73Lkg1817 to Recorded   17  NovoLOG FlexPen 100 UNIT/ML Subcutaneous Solution Pen-injector; Use 15 units TID; Therapy: 01Apr2016 to (Evaluate:59Dhr4083)  Requested for: 32TEE9125 Recorded   18  Omeprazole 20 MG Oral Capsule Delayed Release; TAKE 1 CAPSULE Daily; Therapy: 75UKC7545 to (Evaluate:21Jun2017)  Requested for: 14Bgf6643; Last    Rx:62Fai0404 Ordered   19  OneTouch Ultra Blue In [employer]; Therapy: 75JCH7975 to (Last Rx:13Oct2011)  Requested for: 13Oct2011 Ordered   20  Oxycodone-Acetaminophen 5-325 MG Oral Tablet; Therapy: (Recorded:09Mar2017) to Recorded   21  Pepto-Bismol 262 MG Oral Tablet Chewable; one tablet every 4-6 hours when necessary    for diarrhea loose stools; Therapy: 49OHW2784 to (Last Rx:32Gry5488)  Requested for: 35RUR0526 Ordered   22  SLPG Compound Medication; Bimix: Papaverine 30 mg/ Phentolamine 0 5 mg/ ml;    Therapy: 14CRN1657 to (Last Rx:13Nov2014) Ordered   23  Vitamin D2 2000 UNIT Oral Tablet; Therapy: (Recorded:09Mar2017) to Recorded   24  Vitamin D3 07412 UNIT Oral Capsule; TAKE   3    tablets per month; Therapy: 09ISM7962 to (Corrinne Griffes)  Requested for: 62ZQJ7554; Last    Rx:09Mar2017 Ordered    Allergies    1  HumaLOG SOLN   2  Lipitor TABS   3  Simvastatin TABS   4   Adhesive Tape TAPE    Signatures   Electronically signed by : Peggie Gosselin, M D ; Apr 20 2017 4:04PM EST

## 2018-01-11 NOTE — RESULT NOTES
Message   Recorded as Task   Date: 10/17/2016 04:26 PM, Created By: Dajuan Marcus   Task Name: Follow Up   Assigned To: Sudha Gabriel   Regarding Patient: Jelani Corona, Status: Active   Comment:    Dajuan Marcus - 17 Oct 2016 4:26 PM     TASK CREATED  please schedule patient for:    (B) L3-4, L4-5, L5-S1 MBB#1    M47 816  [id number], [id number], 91391   Therese Ku - 17 Oct 2016 4:34 PM     TASK EDITED   Sudha Gabriel - 19 Oct 2016 2:55 PM     TASK EDITED  l/m for pt to rtc   Sudha Gabriel - 24 Oct 2016 9:41 AM     TASK REASSIGNED: Previously Assigned To [facility] surgery sched,Team  pt called back but has questions about his MRI results and more about the procedure to be scheduled   transfered pt to clinical line and advised pt they can schedule him or tranfer him back to [facility] to assist    Danielle Dunbar - 24 Oct 2016 10:14 AM     TASK EDITED    Spoke to pt, discussed MBB/RFA with the pt  Explained that the MBB is a diagnostic procedure prior to RFA  He will 2 need MBB and complete pain diary  Pt would like to proceed  Sudha Gabriel - 24 Oct 2016 10:36 AM     TASK REPLIED TO: Previously Assigned To Sudha Gabriel  was he scheduled after the conversation or does pt need a call back to schedule? Colleen Santos - 24 Oct 2016 10:39 AM     TASK EDITED   Pt planned to call [facility] to schedule   Sudha Gabriel - 25 Oct 2016 11:35 AM     TASK EDITED   procedure scheduled for 10/31/2016, went over instructions of pain level needs to be a 5 or greater, no as needed/break through pain meds for 6 hrs prior to procedure, pt is diabetic advised to eat regular breakfast and take normal medications  wear comfortable pants such as sweatpants, need for   pt verbally understands instructions as was very appreciative of all the help with the scheduling process

## 2018-01-11 NOTE — MISCELLANEOUS
Message   Date: 02 Feb 2017 8:44 AM EST, Recorded By: Miller Vee For: Jo Benz   Caller: Courtney Gunn, Self   Phone: [phone] (Home)   Reason: Medical Complaint   Patient called in stated is having UTI Symptoms  He stated he has frequency, pain and pressure with urination, and back pain  I spoke with Dr Aisha Jaquez and he stated he will start him on Cipro 500mg 1 tablet twice a day for 1 week  He is to also get a UA/CNS done before he starts the medicine  I called the patient back and advised him of Dr Vidal lucas and he understood  I also advised him the medicine will be sent to his local [facility] and the script for the UA/CNS can be picked up in the office or if he goes to a [facility] lab they can pull it from the system  Active Problems    1  Abrasion Of Left Foot (917 0)   2  Abrasion of leg (916 0) (S80 819A)   3  Anemia (285 9) (D64 9)   4  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,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 9) (I25 10)   5  BPH without obstruction/lower urinary tract symptoms (600 00) (N40 0)   6  Chest discomfort (786 59) (R07 89)   7  Depression (311) (F32 9)   8  Diabetes mellitus type 2, uncontrolled (250 02) (E11 65)   9  Diabetic neuropathy, type II diabetes mellitus (250 60,357 2) (E11 40)   10  Diarrhea (787 91) (R19 7)   11  Diverticulosis (562 10) (K57 90)   12  Dyspnea (786 09) (R06 00)   13  Dysuria (788 1) (R30 0)   14  Eczema (692 9) (L30 9)   15  Elevated ALT measurement (790 4) (R74 0)   16  Elevated AST (SGOT) (790 4) (R74 0)   17  Encounter for screening colonoscopy (V76 51) (Z12 11)   18  Fatty liver (571 8) (K76 0)   19  Foot pain (729 5) (M79 673)   20  Generalized osteoarthritis of multiple sites (715 09) (M15 9)   21  GERD without esophagitis (530 81) (K21 9)   22  Glaucoma screening (V80 1) (Z13 5)   23  Gross hematuria (599 71) (R31 0)   24  Hyperlipidemia (272 4) (E78 5)   25  Hypertension (401 9) (I10)   26  Hypoglycemia (251 2) (E16 2)   27   Hypotension (458  9) (I95 9)   28  Insomnia (780 52) (G47 00)   29  Lower back pain (724 2) (M54 5)   30  Lumbar spondylosis (721 3) (M47 816)   31  Malignant tumor of urinary bladder (188 9) (C67 9)   32  Microhematuria (599 72) (R31 29)   33  Need for pneumococcal vaccination (V03 82) (Z23)   34  Need for prophylactic vaccination and inoculation against influenza (V04 81) (Z23)   35  Occult blood positive stool (792 1) (R19 5)   36  Organic impotence (607 84) (N52 9)   37  Other seasonal allergic rhinitis (477 8) (J30 2)   38  Pedal edema (782 3) (R60 0)   39  Peptic ulcer (533 90) (K27 9)   40  Preop examination (V72 84) (Z01 818)   41  Preop examination (V72 84) (Z01 818)   42  Pre-procedure lab exam (V72 63) (Z01 812)   43  Screening for genitourinary condition (V81 6) (Z13 89)   44  Screening for neurological condition (V80 09) (Z13 89)   45  Sleep apnea (780 57) (G47 30)   46  Thrombocytopenia (287 5) (D69 6)   47  Tinnitus of both ears (388 30) (H93 13)   48  Type 2 diabetes mellitus with hyperglycemia (250 00) (E11 65)   49  Urinary tract infection (599 0) (N39 0)   50  Vitamin D deficiency (268 9) (E55 9)    Current Meds   1  Accu-Chek Martha Plus In Vitro Strip; TEST 3 TIMES DAILY; Therapy: 62OPI4809 to (Evaluate:68Dxw7837)  Requested for: 09BBG2193; Last   Rx:07Jlq3660 Ordered   2  Aspirin 81 MG TABS; TAKE 1 TABLET DAILY; Therapy: 59Knh2606 to Recorded   3  Azelastine HCl - 0 15 % Nasal Solution; INHALE 1 SPRAY Intranasally Twice daily; Therapy: 05MAL5935 to (Evaluate:11Mar2016)  Requested for: 63EOJ6826; Last   Rx:81Rsa4126 Ordered   4  BD Insulin Syringe Ultrafine 31G X 5/16" 0 3 ML Miscellaneous; USE AS DIRECTED; Therapy: 49DRX7145 to ()  Requested for: 09QWQ8646; Last   Rx:58Ikk6026 Ordered   5  BD Pen Needle Short U/F 31G X 8 MM Miscellaneous; inject insulin five times daily; Therapy: 98AAT2791 to (Evaluate:22Vdm5878)  Requested for: 20WSZ1945; Last   Rx:18Nov2016 Ordered   6  Fluocinonide 0 05 % External Ointment; APPLY SPARINGLY TO AFFECTED AREA(S)   TWICE DAILY; Therapy: 49MAA5821 to (Evaluate:25Mar2014)  Requested for: 78IPD2143; Last   Rx:24Jan2014 Ordered   7  Fluticasone Propionate 50 MCG/ACT Nasal Suspension; use 2 sprays in each nostril   once daily; Therapy: 68ZDW2090 to (Evaluate:18Jan2015)  Requested for: 10NHA0624; Last   Rx:19Nov2014 Ordered   8  Furosemide 20 MG Oral Tablet; TAKE ONE TABLET BY MOUTH EVERY DAY AS   DIRECTED; Therapy: 03KIL0066 to  Requested for: 00KEW4137 Recorded   9  Gabapentin 300 MG Oral Capsule; TAKE 2 CAPSULES AT BEDTIME NIGHTLY; Therapy: 86KJS5276 to (Evaluate:43Tou7453)  Requested for: 70WUR5679; Last   Rx:28Nov2016 Ordered   10  Isosorbide Mononitrate ER 30 MG Oral Tablet Extended Release 24 Hour; take 1 tablet    by mouth every day; Therapy: 67UOX8614 to [phone])  Requested for: 64HYX0377; Last    Rx:05Jan2017 Ordered   11  Lantus SoloStar 100 UNIT/ML Subcutaneous Solution Pen-injector; INJECT    SUBCUTANEOUSLY 30   UNITS IN THE MORNING AND      EVENING OR AS DIRECTED; Therapy: 88GZR4908 to (Evaluate:83Rjs8897)  Requested for: 83QHF3638; Last    Rx:17Nov2016 Ordered   12  Loperamide HCl - 2 MG Oral Capsule; TAKE 1 CAPSULE BY MOUTH three times DAILY    prn; Therapy: 92ZSB5123 to (Kumar Aron)  Requested for: 00GWR4375; Last    Rx:06Oct2016 Ordered   15  Lumigan 0 01 % Ophthalmic Solution; Therapy: 50PLS0913 to Recorded   14  Metoprolol Succinate  MG Oral Tablet Extended Release 24 Hour; TAKE 1    TABLET DAILY; Therapy: 35XLQ4986 to (Sascha Boggs)  Requested for: 08DXP1300; Last    Rx:03Nov2015 Ordered   15  Multivitamins TABS; TAKE 1 TABLET DAILY; Therapy: 65Lkg4968 to Recorded   16  NovoLOG FlexPen 100 UNIT/ML Subcutaneous Solution Pen-injector; Use 15 units TID; Therapy: 01Apr2016 to (Evaluate:83Nhz2231)  Requested for: 99PYH2672 Recorded   17   Omeprazole 20 MG Oral Capsule Delayed Release; TAKE 1 CAPSULE Daily; Therapy: 94HPT6585 to (Evaluate:21Jun2017)  Requested for: 15Dvx7906; Last    Rx:12Ubc0222 Ordered   18  OneTouch Ultra Blue In [employer]; Therapy: 36UIE9843 to (Last Rx:13Oct2011)  Requested for: 13Oct2011 Ordered   19  Pepto-Bismol 262 MG Oral Tablet Chewable; one tablet every 4-6 hours when necessary    for diarrhea loose stools; Therapy: 57HGB2000 to (Last Rx:55Nhn9371)  Requested for: 78UEJ7602 Ordered   20  RaNITidine HCl - 150 MG Oral Capsule; Take 1 capsule twice daily; Therapy: 40NMR1248 to (Last Rx:06Oct2016)  Requested for: 00SWQ3317 Ordered   21  SLPG Compound Medication; Bimix: Papaverine 30 mg/ Phentolamine 0 5 mg/ ml;    Therapy: 95DCJ8765 to (Last Rx:13Nov2014) Ordered   22  Vitamin D3 53042 UNIT Oral Capsule; TAKE 1 TABLET EVERY TWO WEEKS; Therapy: 62TNF7860 to (Evaluate:01Mar2017)  Requested for: 19HCZ4514; Last    Rx:47Jed8469 Ordered    Allergies    1  HumaLOG SOLN   2  Lipitor TABS   3  Simvastatin TABS   4   Adhesive Tape TAPE    Signatures   Electronically signed by : FLORESITA Segovia ; Feb 2 2017 11:53AM EST

## 2018-01-12 VITALS
HEIGHT: 76 IN | HEART RATE: 64 BPM | TEMPERATURE: 95.3 F | RESPIRATION RATE: 16 BRPM | BODY MASS INDEX: 30.2 KG/M2 | DIASTOLIC BLOOD PRESSURE: 70 MMHG | SYSTOLIC BLOOD PRESSURE: 106 MMHG | WEIGHT: 248 LBS

## 2018-01-12 VITALS
WEIGHT: 244 LBS | BODY MASS INDEX: 29.71 KG/M2 | TEMPERATURE: 97.5 F | HEIGHT: 76 IN | HEART RATE: 74 BPM | DIASTOLIC BLOOD PRESSURE: 76 MMHG | RESPIRATION RATE: 15 BRPM | SYSTOLIC BLOOD PRESSURE: 116 MMHG

## 2018-01-12 VITALS
SYSTOLIC BLOOD PRESSURE: 140 MMHG | HEART RATE: 68 BPM | HEIGHT: 76 IN | RESPIRATION RATE: 15 BRPM | TEMPERATURE: 96.7 F | WEIGHT: 257.38 LBS | BODY MASS INDEX: 31.34 KG/M2 | DIASTOLIC BLOOD PRESSURE: 84 MMHG

## 2018-01-12 VITALS
SYSTOLIC BLOOD PRESSURE: 120 MMHG | TEMPERATURE: 96.8 F | HEART RATE: 68 BPM | WEIGHT: 248 LBS | BODY MASS INDEX: 30.2 KG/M2 | HEIGHT: 76 IN | RESPIRATION RATE: 18 BRPM | DIASTOLIC BLOOD PRESSURE: 70 MMHG

## 2018-01-12 NOTE — MISCELLANEOUS
Message   Date: 24 Aug 2016 4:53 PM EST, Recorded By: Claudia Mayers For: Scott Sears: Joaquin Jacques, Self   Phone: [phone] (Home)   Reason: Medical Complaint   Deandra Dennis called c/o burning and discomfort when urinating  He had a bladder biopsy by Dr Kavita Daniels on 8/16/16  He has a UA and culture done yesterday, which the culture isn't back yet  Dr Rosamaria Randall would like Deandra Dennis to hold off through the night, and call Urology tomorrow morning  If they don't get back to him, then call our office back  Active Problems    1  Abrasion Of Left Foot (917 0)   2  Abrasion of leg (916 0) (S80 819A)   3  Anemia (285 9) (D64 9)   4  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,440 9) (I25 10)   5  BPH without obstruction/lower urinary tract symptoms (600 00) (N40 0)   6  Chest discomfort (786 59) (R07 89)   7  Depression (311) (F32 9)   8  Diabetes mellitus type 2, uncontrolled (250 02) (E11 65)   9  Diabetic neuropathy, type II diabetes mellitus (250 60,357 2) (E11 40)   10  Diarrhea (787 91) (R19 7)   11  Diverticulosis (562 10) (K57 90)   12  Dyspnea (786 09) (R06 00)   13  Dysuria (788 1) (R30 0)   14  Eczema (692 9) (L30 9)   15  Elevated ALT measurement (790 4) (R74 0)   16  Elevated AST (SGOT) (790 4) (R74 0)   17  Encounter for screening colonoscopy (V76 51) (Z12 11)   18  Fatty liver (571 8) (K76 0)   19  Foot pain (729 5) (M79 673)   20  Generalized osteoarthritis of multiple sites (715 09) (M15 9)   21  GERD without esophagitis (530 81) (K21 9)   22  Gross hematuria (599 71) (R31 0)   23  Hyperlipidemia (272 4) (E78 5)   24  Hypertension (401 9) (I10)   25  Hypoglycemia (251 2) (E16 2)   26  Hypotension (458 9) (I95 9)   27  Insomnia (780 52) (G47 00)   28  Lower back pain (724 2) (M54 5)   29  Malignant tumor of urinary bladder (188 9) (C67 9)   30  Microhematuria (599 72) (R31 2)   31  Need for pneumococcal vaccination (V03 82) (Z23)   32   Need for prophylactic vaccination and inoculation against influenza (V04 81) (Z23)   33  Occult blood positive stool (792 1) (R19 5)   34  Organic impotence (607 84) (N52 9)   35  Other seasonal allergic rhinitis (477 8) (J30 2)   36  Pedal edema (782 3) (R60 0)   37  Peptic ulcer (533 90) (K27 9)   38  Preop examination (V72 84) (Z01 818)   39  Preop examination (V72 84) (Z01 818)   40  Screening for genitourinary condition (V81 6) (Z13 89)   41  Screening for neurological condition (V80 09) (Z13 89)   42  Sleep apnea (780 57) (G47 30)   43  Thrombocytopenia (287 5) (D69 6)   44  Tinnitus of both ears (388 30) (H93 13)   45  Type 2 diabetes mellitus with hyperglycemia (250 00) (E11 65)   46  Urinary tract infection (599 0) (N39 0)   47  Vitamin D deficiency (268 9) (E55 9)    Current Meds   1  Accu-Chek Martha Plus In Vitro Strip; TEST 3 TIMES DAILY; Therapy: 14EVW9724 to (Evaluate:75Fsf8437)  Requested for: 79KAP2832; Last   Rx:95Soq7619 Ordered   2  Aspirin 81 MG TABS; TAKE 1 TABLET DAILY; Therapy: 31Frq5923 to Recorded   3  Azelastine HCl - 0 15 % Nasal Solution; INHALE 1 SPRAY Intranasally Twice daily; Therapy: 94WZH4521 to (Evaluate:11Mar2016)  Requested for: 73UVY3649; Last   Rx:62Tqa0133 Ordered   4  BD Insulin Syringe Ultrafine 31G X 5/16" 0 3 ML Miscellaneous; USE AS DIRECTED; Therapy: 23KLW8281 to ([phone])  Requested for: 00OHC5269; Last   Rx:48Iey3625 Ordered   5  BD Pen Needle Short U/F 31G X 8 MM Miscellaneous; USE AND DISCARD 2 PEN        NEEDLES DAILY; Therapy: 94VPC3456 to (Evaluate:13Jun2016)  Requested for: 80DJO3655; Last   Rx:36Oht0341 Ordered   6  Fluocinonide 0 05 % External Ointment; APPLY SPARINGLY TO AFFECTED AREA(S)   TWICE DAILY; Therapy: 99EAI7014 to (Evaluate:25Mar2014)  Requested for: 14TAT1624; Last   Rx:24Jan2014 Ordered   7  Fluticasone Propionate 50 MCG/ACT Nasal Suspension; use 2 sprays in each nostril   once daily; Therapy: 46MIK1687 to (Evaluate:18Jan2015)  Requested for: 72PQG9517;  Last Rx:19Nov2014 Ordered   8  Furosemide 20 MG Oral Tablet; TAKE ONE TABLET BY MOUTH EVERY DAY AS   DIRECTED; Therapy: 77OGX9952 to  Requested for: 47RHW4666 Recorded   9  Gabapentin 300 MG Oral Capsule; TAKE 2 CAPSULES AT BEDTIME NIGHTLY; Therapy: 27WBA1993 to (Evaluate:14Nov2016)  Requested for: 04HCV3525; Last   Rx:55Syh3113 Ordered   10  Isosorbide Mononitrate ER 30 MG Oral Tablet Extended Release 24 Hour; TAKE 1    TABLET DAILY; Therapy: 56GHP2578 to (Silvio Haney)  Requested for: 09EQB9555; Last    Rx:03Nov2015 Ordered   11  Lantus SoloStar 100 UNIT/ML Subcutaneous Solution Pen-injector; INJECT 55 UNITS    AT BEDTIME; Therapy: 62WCA4909 to Recorded   12  Lumigan 0 01 % Ophthalmic Solution; Therapy: 68RSM8545 to Recorded   13  MetFORMIN HCl - 1000 MG Oral Tablet; Take 2 tbs daily - PRESCRIBED AT [facility]; Therapy: 11ALS1732 to Recorded   14  Metoprolol Succinate  MG Oral Tablet Extended Release 24 Hour; TAKE 1    TABLET DAILY; Therapy: 20MZJ7538 to (Anuj Bowen)  Requested for: 89OBB2930; Last    Rx:03Nov2015 Ordered   15  Multivitamins TABS; TAKE 1 TABLET DAILY; Therapy: 84Hpd4509 to Recorded   16  NovoLOG FlexPen 100 UNIT/ML Subcutaneous Solution Pen-injector; Use 15 units TID; Therapy: 33Dwd1508 to (Evaluate:11Llg9560)  Requested for: 96QAQ9609 Recorded   17  Omeprazole 20 MG Oral Capsule Delayed Release; TAKE 1 CAPSULE Daily; Therapy: 81LQU4919 to (Evaluate:15Jun2016)  Requested for: 44KQZ7086; Last    Rx:16Irr1801 Ordered   18  OneTouch Ultra Blue In [employer]; Therapy: 93GFU2074 to (Last Rx:13Oct2011)  Requested for: 13Oct2011 Ordered   19  Pepto-Bismol 262 MG Oral Tablet Chewable; one tablet every 4-6 hours when necessary    for diarrhea loose stools; Therapy: 02JQZ9522 to (Last Rx:36Pfc5293)  Requested for: 73OBT4958 Ordered   20   SLPG Compound Medication; Bimix: Papaverine 30 mg/ Phentolamine 0 5 mg/ ml;    Therapy: 92TAQ4736 to (Last Rx:18Mbv6050) Ordered   21  Vitamin D3 81656 UNIT Oral Capsule; TAKE 1 TABLET EVERY TWO WEEKS; Therapy: 91KJK9389 to (Evaluate:10Aug2016)  Requested for: 68OFY0717 Recorded    Allergies    1  HumaLOG SOLN   2  Lipitor TABS   3  Simvastatin TABS   4  Adhesive Tape TAPE    Discussion/Summary    UA was positive and verified by urology but patient not notified, Positive for blood and WBCs which may be post op findings  Wait for culture or further instructions by urology  We can directly discuss with them if patient doesn't hear from them        Signatures   Electronically signed by : FLORESITA Nguyễn ; Aug 24 2016 10:55PM EST                       (Author)

## 2018-01-12 NOTE — RESULT NOTES
Message   Recorded as Task   Date: 06/18/2017 06:35 AM, Created By: Basia Navarrete   Task Name: Follow Up   Assigned To: Esperanza Silvestre   Regarding Patient: Rahel Peterson, Status: In Progress   CommentGina Melgar - 18 Jun 2017 6:35 AM     TASK CREATED  xray  cervical  &  shoulder  DJD  no   acute  injury  follow  up  ortho   Yaritza Pandya - 19 Jun 2017 11:32 AM     TASK EDITED  Spoke to patient and gave results  Alicia Gonzalez - 99 ZJG 9749 11:32 AM     TASK IN PROGRESS   Yaritza Pandya - 20 Jun 2017 7:40 AM     TASK EDITED  Spoke to patient and gave results    He still has the bump on his shoulder he needs to follow with orthopedics

## 2018-01-12 NOTE — PROCEDURES
Procedures by Megan Alvarado RN at 4/27/2017  1:54 PM      Author: Megan Alvarado RN  Service:  (none) Author Type:  Registered Nurse     Filed:  4/27/2017  1:58 PM  Date of Service:  4/27/2017  1:54 PM Status:  Addendum     : Megan Alvarado RN (Registered Nurse)        Related Notes: Original Note by Megan Alvarado RN (Registered Nurse) filed at 4/27/2017  1:57 PM         Procedure Orders:       1  Insert PICC line [[id number]] ordered by Sommer Mcadams DPM at 04/27/17 1057                     Insert PICC line  Date/Time: 4/27/2017 1:54 PM  Performed by: Thao Pearson by: Anaid Ng     Patient location:  Bedside  Consent:     Consent obtained:  Written    Consent given by:  Patient    Procedural risks discussed: consent obtained by physician  [place] protocol:     Procedure explained and questions answered to patient or proxy's satisfaction: yes      Relevant documents present and verified: yes      Test results available and properly labeled: yes       Imaging studies available: yes      Required blood products, implants, devices, and special equipment available: yes      Site/side marked: yes      Immediately prior to procedure, a time out was called: yes      Patient identity confirmed:  Hospital-assigned identification number, arm band, verbally with patient and provided demographic data  Pre-procedure details:     Hand hygiene: Hand hygiene performed prior to insertion      Sterile barrier technique: All elements of maximal sterile technique followed      Skin preparation:  ChloraPrep  Indications:     PICC line indications: long term antibiotics    Anesthesia (see MAR for exact dosages):      Anesthesia method:  Local infiltration (3ml)    Local anesthetic:  Lidocaine 1% w/o epi  Procedure details:     Location:  Basilic    Vessel type: vein      Laterality:  Right    Approach: percutaneous technique used      Patient position:  Flat    Procedural supplies:  Double lumen    Catheter size:  5 Fr    Landmarks identified: yes      Ultrasound guidance: yes      Sterile ultrasound techniques: Sterile gel and sterile probe covers were used      Number of attempts:  1    Successful placement: yes      Vessel of catheter tip end:  Sherlock 3CG confirmed    Total catheter length (cm):  49    Catheter out on skin (cm):  1    Max flow rate:  999ml/hr    Arm circumference:  30cm  Post-procedure details:     Post-procedure:  Dressing applied and securement device placed    Assessment:  Blood return through all ports and free fluid flow (sherlock 3cg confirmed placement  rhythm record placed on pts chart for medical records)    Post-procedure complications: none      Patient tolerance of procedure:   Tolerated well, no immediate complications  Comments:      4/24/17 BC negative 72 hours Dr Jono siu for picc insertion  Lot # [id number]                       Received for:Provider  EPIC   Apr 27 2017  1:58PM Eastern Standard Time

## 2018-01-13 VITALS
HEIGHT: 76 IN | HEART RATE: 84 BPM | DIASTOLIC BLOOD PRESSURE: 68 MMHG | TEMPERATURE: 97.3 F | BODY MASS INDEX: 31.05 KG/M2 | RESPIRATION RATE: 15 BRPM | SYSTOLIC BLOOD PRESSURE: 108 MMHG | WEIGHT: 255 LBS

## 2018-01-13 VITALS
WEIGHT: 248 LBS | RESPIRATION RATE: 16 BRPM | HEIGHT: 76 IN | TEMPERATURE: 96.1 F | SYSTOLIC BLOOD PRESSURE: 116 MMHG | HEART RATE: 64 BPM | BODY MASS INDEX: 30.2 KG/M2 | DIASTOLIC BLOOD PRESSURE: 80 MMHG

## 2018-01-13 VITALS
WEIGHT: 248 LBS | RESPIRATION RATE: 18 BRPM | TEMPERATURE: 97.1 F | DIASTOLIC BLOOD PRESSURE: 68 MMHG | SYSTOLIC BLOOD PRESSURE: 118 MMHG | HEIGHT: 76 IN | BODY MASS INDEX: 30.2 KG/M2 | HEART RATE: 76 BPM

## 2018-01-13 VITALS
DIASTOLIC BLOOD PRESSURE: 59 MMHG | RESPIRATION RATE: 15 BRPM | BODY MASS INDEX: 31.05 KG/M2 | TEMPERATURE: 97.8 F | SYSTOLIC BLOOD PRESSURE: 116 MMHG | HEIGHT: 76 IN | HEART RATE: 69 BPM | WEIGHT: 255 LBS

## 2018-01-13 VITALS
WEIGHT: 248 LBS | DIASTOLIC BLOOD PRESSURE: 74 MMHG | RESPIRATION RATE: 18 BRPM | HEIGHT: 76 IN | SYSTOLIC BLOOD PRESSURE: 122 MMHG | TEMPERATURE: 96.5 F | HEART RATE: 64 BPM | BODY MASS INDEX: 30.2 KG/M2

## 2018-01-13 VITALS
WEIGHT: 248 LBS | TEMPERATURE: 95.7 F | RESPIRATION RATE: 18 BRPM | SYSTOLIC BLOOD PRESSURE: 114 MMHG | DIASTOLIC BLOOD PRESSURE: 72 MMHG | HEART RATE: 72 BPM | BODY MASS INDEX: 30.2 KG/M2 | HEIGHT: 76 IN

## 2018-01-13 VITALS
TEMPERATURE: 96.5 F | WEIGHT: 248 LBS | SYSTOLIC BLOOD PRESSURE: 114 MMHG | HEART RATE: 68 BPM | BODY MASS INDEX: 30.2 KG/M2 | RESPIRATION RATE: 18 BRPM | HEIGHT: 76 IN | DIASTOLIC BLOOD PRESSURE: 78 MMHG

## 2018-01-13 NOTE — PROGRESS NOTES
Plan    1  DSMT/MNT Time Record; Status:Complete;   Done: 49UZA4780 11:32AM    Chief Complaint  Mike Carrasquillo was seen for MNT for type 2 diabetes  History of Present Illness  Pt's diet recall reveals 4 small-moderate meals daily and he only takes 3 shots of Novolog per day  He is frustrated with meals that his wife cooks because he says she buys and cooks what she wants, not what is healthy for him  He is able to cook and wants to start doing some of his own shopping and meal preparation, particularly at dinner so he can eat by 5:30-6  Given his h/o RNY Gastric Bypass, he can only tolerate a moderate volume of food at a time  His estimated calorie needs likely exceed what he really requires for weight maintenance at this time  Took some time to review portion booklet which he had received and learned about when attending group classes with us  Based on 1770 kcal/day, created an individualized meal plan with 3 meals and 2-3 snacks daily  Advised him to take Novolog within 10 minutes of eating meals and to keep snack carbohydrates low since he won't be taking insulin at snack times  Today he took his Novolog at 8am at home  His BG was 209 mg/dL at the time  He did not eat breakfast  Checked his BG before he left the office today and reading 183 mg/dL, this was 3 hours later  Instructed Mike Carrasquillo to try to follow meal plan, keep 3 day food diary, and return for f/u in 6 weeks  Additionally, advised him to send 2 week BG readings to Dr Jesus Najera for review in light of elevated FBG and overall readings revealing no benefit from Metformin BID  This is his initial assessment    Present at session: patient    Data:   States FBG this morning was 209  Recently all FBG readings are 150 and higher  Medical Diagnosis/Reason for Referral:E11 65  He has no special learning needs  His caloric needs are 1770  He has had no recent weight change  Spouse  shops for food  Spouse  cooks the food     Exercise routine: No routine but does like to walk and has access to a gym  Wants to start walking regularly  He eats breakfast at  8 AM Cheerios or Corn Flakes w/ banana sometimes, water OR plain oatmeal packet OR 1 egg & toast  Coffee w/ a little 2% milk    He eats lunch at  12-1 PM Cup of soup and 4 saltines at diner or at home   He snacks at 3-4 PM The meal that his soup at lunchtime came with:sandwich OR entree & veggies  Or just pretzels if he didn't go out   He eats dinner at  8 PM Shepherds pie, lima beans, green salad, applesauce      NUTRITION DIAGNOSES   Inconsistent Intake Carbs   Inconsistent carbohydrate intake related to: Physiological causes requiring careful timing and consistency in the amount of carbohydrate (i e  diabetes mellitus, hypoglycemia) and Food and nutrition related knowledge deficit concerning appropriate amount and timing of carbohydrate intake  As evidenced by: Estimated carbohydrate intake that is different from recommended types or ingested on an irregular basis and Use of insulin or insulin secretagogues   Medical Nutrition Therapy Intervention: Carbohydrate counting, Meal planning, Individualized meal plan, Strategies to monitor portion control, Label reading and Exercise Guidelines  His comprehension was very good    His motivation was very good    His compliance was good   Goals:  1  30-45 g carb/meal  2  Keep 3 day food record before next visit  3   Start walking for 20-30 minutes most days      Vitals  Signs   Recorded: 27JWK4052 11:29AM   Weight: 259 lb 3 2 oz  BMI Calculated: 31 55  BSA Calculated: 2 47    Results/Data  DSMT/MNT Time Record 77KNU7611 11:32AM Zehra Angelo     Test Name Result Flag Reference   Date of Service 11/3/17     Start - Stop Time 9:45-11     Total MInutes 75     Group Or Individual Instruction MNT-I       Future Appointments    Date/Time Provider Specialty Site   12/15/2017 09:45 AM Zehra Angelo RD Diabetes Educator [facility] ENDOCRINOLOGY   12/27/2017 10:50 AM FLORESITA Degroot  Endocrinology [facility] ENDOCRINOLOGY  [place]   12/21/2017 08:45 AM FLORESITA Zapata   Internal Medicine [facility]     Signatures   Electronically signed by : Diamond Chopra RD; Nov  3 2017 11:52AM EST                       (Author)    Electronically signed by : FLORESITA Rodriguez ; Nov 6 2017  7:54AM EST

## 2018-01-13 NOTE — MISCELLANEOUS
Chief Complaint  Chief Complaint Free Text Note Form: d/c [facility] 4 29 17 for cellulitis of right foot, s/p I&D with graft application  Laura Osborne declined GALILEA appt  He thinks he may be re-admitted  Meds were not verified  History of Present Illness  TCM Communication [facility]: The patient is being contacted for follow-up after hospitalization  He was hospitalized at [facility] [place]  The date of admission: 4 21 17, date of discharge: 4 29 17  Diagnosis: cellulitis of right foot, s/p I&D with graft application  He was discharged to home  Medications were not reviewed today  He did not schedule a follow up appointment  He refused a follow up appointment due to thinks he may be re-admitted  Communication performed and completed by Rosanne Palomo      Active Problems     1  Abrasion Of Left Foot (917 0)   2  Abrasion of leg (916 0) (S80 819A)   3  Anemia (285 9) (D64 9)   4  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,440 9) (I25 10)   5  BPH without obstruction/lower urinary tract symptoms (600 00) (N40 0)   6  Chest discomfort (786 59) (R07 89)   7  Chronic heel ulcer (707 14) (L97 409)   8  Depression (311) (F32 9)   9  Diabetes mellitus type 2, uncontrolled (250 02) (E11 65)   10  Diabetic neuropathy, type II diabetes mellitus (250 60,357 2) (E11 40)   11  Diarrhea (787 91) (R19 7)   12  Diverticulosis (562 10) (K57 90)   13  Dyspnea (786 09) (R06 00)   14  Dysuria (788 1) (R30 0)   15  Eczema (692 9) (L30 9)   16  Elevated ALT measurement (790 4) (R74 0)   17  Elevated AST (SGOT) (790 4) (R74 0)   18  Encounter for screening colonoscopy (V76 51) (Z12 11)   19  Fatty liver (571 8) (K76 0)   20  Foot pain (729 5) (M79 673)   21  Generalized osteoarthritis of multiple sites (715 09) (M15 9)   22  GERD without esophagitis (530 81) (K21 9)   23  Glaucoma screening (V80 1) (Z13 5)   24  Gross hematuria (599 71) (R31 0)   25  Hyperlipidemia (272 4) (E78 5)   26  Hypertension (401 9) (I10)   27   Hypoglycemia (251 2) (E16 2)   28  Hypotension (458 9) (I95 9)   29  Insomnia (780 52) (G47 00)   30  Lower back pain (724 2) (M54 5)   31  Lumbar spondylosis (721 3) (M47 816)   32  Malignant tumor of urinary bladder (188 9) (C67 9)   33  Microhematuria (599 72) (R31 29)   34  Need for pneumococcal vaccination (V03 82) (Z23)   35  Need for prophylactic vaccination and inoculation against influenza (V04 81) (Z23)   36  Non-healing surgical wound, initial encounter (998 83) (T81 89XA)   37  Non-healing surgical wound, subsequent encounter (V58 89,998 83) (T81 89XD)   38  Occult blood positive stool (792 1) (R19 5)   39  Organic impotence (607 84) (N52 9)   40  Other seasonal allergic rhinitis (477 8) (J30 2)   41  Pedal edema (782 3) (R60 0)   42  Peptic ulcer (533 90) (K27 9)   43  Preop examination (V72 84) (Z01 818)   44  Preop examination (V72 84) (Z01 818)   45  Pre-procedure lab exam (V72 63) (Z01 812)   46  Puncture wound of right foot, subsequent encounter (V58 89,892 0) (S91 331D)   47  Screening for genitourinary condition (V81 6) (Z13 89)   48  Screening for neurological condition (V80 09) (Z13 89)   49  Sleep apnea (780 57) (G47 30)   50  Thrombocytopenia (287 5) (D69 6)   51  Tinnitus of both ears (388 30) (H93 13)   52  Urinary tract infection (599 0) (N39 0)   53  Vitamin D deficiency (268 9) (E55 9)    Type 2 diabetes mellitus with hyperglycemia (250 00) (E11 65)       Diabetic ulcer of right foot associated with type 2 diabetes mellitus (250 80) (E11 621)       Ulcer On The Feet Dorsal Gonzalez Scale ___ (0-5)          Past Medical History    1  History of Bladder Cancer (V10 51)   2  History of Diabetic ulcer of right foot associated with type 2 diabetes mellitus   (250 80,707 15) (E11 621,L97 519)   3  History of acute sinusitis (V12 69) (Z87 09)   4  History of transient cerebral ischemia (V12 54) (Z86 73)   5  History of urinary tract infection (V13 02) (Z87 440)   6   History of viral gastroenteritis (V12 09) (Z86 19)   7  Need for prophylactic vaccination and inoculation against influenza (V04 81) (Z23)    Surgical History    1  History of Cath Stent Placement   2  History of Cystoscopy With Biopsy   3  History of Cystourethroscopy   4  History of Diagnostic Cystoscopy   5  History of Gastric Surgery For Morbid Obesity Bypass With Shaji-en-Y   6  History of Interventional Cardiac Catheterization   7  History of Intestinal Surgery Shaji-en-Y   8  History of Knee Arthroplasty    Family History  Mother    1  Family history of Diabetes   2  Family history of Heart disease   3  Family history of High blood pressure  Father    4  Family history of Heart disease  Sister    5  Family history of Diabetes   6  Family history of High blood pressure   7  Family history of Kidney disease  Brother    6  Family history of Heart disease   9  Family history of High blood pressure    Social History    · Former smoker (P90 46) (D16 823)    Current Meds   1  Accu-Chek Martha Plus In Vitro Strip; CHECK BLOOD SUGARS 3 TIMES A DAY WITH   MEALS AND AT BEDTIME   DIA; Therapy: 74VID8216 to (766 153 173)  Requested for: 2017; Last   Rx:2017 Ordered   2  Aspirin 81 MG TABS; TAKE 1 TABLET DAILY; Therapy: 86Bwg4825 to Recorded   3  Azelastine HCl - 0 15 % Nasal Solution; INHALE 1 SPRAY Intranasally Twice daily; Therapy: 01OTX6102 to (Evaluate:2016)  Requested for: 72FWH2360; Last   Rx:64Hgo3926 Ordered   4  BD Insulin Syringe Ultrafine 31G X 5/16" 0 3 ML Miscellaneous; USE AS DIRECTED; Therapy: 61TZF7493 to (Teresa Jackson)  Requested for: 09RHF5368; Last   Rx:78Ysx9485 Ordered   5  BD Pen Needle Short U/F 31G X 8 MM Miscellaneous; inject insulin five times daily; Therapy: 12CVI5804 to (Evaluate:50Vsk9531)  Requested for: 41IOE8834; Last   Rx:2017 Ordered   6   Cephalexin 500 MG Oral Capsule; TAKE 1 CAPSULE 4 TIMES DAILY UNTIL GONE;   Therapy: 49QNP7334 to (Evaluate:2017)  Requested for: 85GRC5873; Last   Rx:30Mar2017 Ordered   7  Fluocinonide 0 05 % External Ointment; APPLY SPARINGLY TO AFFECTED AREA(S)   TWICE DAILY; Therapy: 97NKG1152 to (Evaluate:25Mar2014)  Requested for: 73SXD4234; Last   Rx:24Jan2014 Ordered   8  Fluticasone Propionate 50 MCG/ACT Nasal Suspension; use 2 sprays in each nostril   once daily; Therapy: 82SKM2650 to (Evaluate:18Jan2015)  Requested for: 01KZG3853; Last   Rx:19Nov2014 Ordered   9  Furosemide 20 MG Oral Tablet; TAKE ONE TABLET BY MOUTH EVERY DAY AS   DIRECTED; Therapy: 39RXD8447 to  Requested for: 26EXD1622 Recorded   10  Gabapentin 300 MG Oral Capsule; TAKE 2 CAPSULES AT BEDTIME NIGHTLY; Therapy: 41XIL1580 to (Evaluate:03Sep2017)  Requested for: 64JIS7537; Last    Rx:07Mar2017 Ordered   11  Isosorbide Mononitrate ER 30 MG Oral Tablet Extended Release 24 Hour; take 1 tablet    by mouth every day; Therapy: 89YAR5125 to (Charity Alvarado)  Requested for: 95HNS1511; Last    Rx:05Jan2017 Ordered   12  Lantus SoloStar 100 UNIT/ML Subcutaneous Solution Pen-injector; Injected 55 units    subcutaneous at bedtime; Therapy: 63MNB9004 to (Evaluate:05Sep2017)  Requested for: 14WDL3528 Recorded   13  Loperamide HCl - 2 MG Oral Capsule; TAKE 1 CAPSULE BY MOUTH three times DAILY    prn; Therapy: 94ODJ7284 to (Brooke Monique)  Requested for: 39SLB7049; Last    Rx:06Oct2016 Ordered   15  Lumigan 0 01 % Ophthalmic Solution; Therapy: 38NHG8043 to Recorded   15  Metoprolol Succinate  MG Oral Tablet Extended Release 24 Hour; TAKE 1 TABLET    DAILY; Therapy: 19NRW7220 to (Zak Hewitt)  Requested for: 64DOD0759; Last    Rx:03Nov2015 Ordered   16  Multivitamins TABS; TAKE 1 TABLET DAILY; Therapy: 55Rvv4907 to Recorded   17  NovoLOG FlexPen 100 UNIT/ML Subcutaneous Solution Pen-injector; Use 15 units TID; Therapy: 01Apr2016 to (Evaluate:64Tic8063)  Requested for: 12CGO6202 Recorded   18   Omeprazole 20 MG Oral Capsule Delayed Release; TAKE 1 CAPSULE Daily; Therapy: 05TRL2636 to (Evaluate:21Jun2017)  Requested for: 62Irb9053; Last    Rx:39Uvx1242 Ordered   19  Oxycodone-Acetaminophen 5-325 MG Oral Tablet; Therapy: (Recorded:09Mar2017) to Recorded   20  Pepto-Bismol 262 MG Oral Tablet Chewable; one tablet every 4-6 hours when necessary    for diarrhea loose stools; Therapy: 68NVR1379 to (Last Rx:19Gtm2325)  Requested for: 25IKP7998 Ordered   21  SLPG Compound Medication; Bimix: Papaverine 30 mg/ Phentolamine 0 5 mg/ ml;    Therapy: 31XFP5803 to (Last Rx:13Nov2014) Ordered   22  Vitamin D2 2000 UNIT Oral Tablet; Therapy: (Recorded:09Mar2017) to Recorded   23  Vitamin D3 32399 UNIT Oral Capsule; TAKE   3    tablets per month; Therapy: 34QRJ6847 to (Alvina Sanaz)  Requested for: 28XWT9433; Last    Rx:09Mar2017 Ordered    Allergies    1  HumaLOG SOLN   2  Lipitor TABS   3  Simvastatin TABS   4   Adhesive Tape TAPE    Future Appointments    Date/Time Provider Specialty Site   05/11/2017 03:00 PM Frankie Valenzuela, [address]     Signatures   Electronically signed by : FLORESITA Rowe ; May  5 2017  1:10PM EST                       (Author)

## 2018-01-14 VITALS
WEIGHT: 248 LBS | SYSTOLIC BLOOD PRESSURE: 124 MMHG | BODY MASS INDEX: 30.2 KG/M2 | DIASTOLIC BLOOD PRESSURE: 84 MMHG | TEMPERATURE: 96.7 F | HEIGHT: 76 IN | RESPIRATION RATE: 18 BRPM | HEART RATE: 68 BPM

## 2018-01-14 VITALS
HEIGHT: 76 IN | SYSTOLIC BLOOD PRESSURE: 126 MMHG | WEIGHT: 248 LBS | TEMPERATURE: 95.7 F | DIASTOLIC BLOOD PRESSURE: 84 MMHG | RESPIRATION RATE: 16 BRPM | BODY MASS INDEX: 30.2 KG/M2 | HEART RATE: 68 BPM

## 2018-01-14 VITALS
WEIGHT: 248 LBS | HEART RATE: 68 BPM | DIASTOLIC BLOOD PRESSURE: 74 MMHG | RESPIRATION RATE: 18 BRPM | BODY MASS INDEX: 30.2 KG/M2 | HEIGHT: 76 IN | TEMPERATURE: 96.6 F | SYSTOLIC BLOOD PRESSURE: 130 MMHG

## 2018-01-14 VITALS
BODY MASS INDEX: 29.71 KG/M2 | DIASTOLIC BLOOD PRESSURE: 78 MMHG | HEIGHT: 76 IN | RESPIRATION RATE: 15 BRPM | TEMPERATURE: 96.2 F | HEART RATE: 72 BPM | SYSTOLIC BLOOD PRESSURE: 118 MMHG | WEIGHT: 244 LBS

## 2018-01-14 VITALS — BODY MASS INDEX: 31.55 KG/M2 | WEIGHT: 259.2 LBS

## 2018-01-14 VITALS
DIASTOLIC BLOOD PRESSURE: 80 MMHG | BODY MASS INDEX: 31.29 KG/M2 | HEART RATE: 80 BPM | SYSTOLIC BLOOD PRESSURE: 110 MMHG | TEMPERATURE: 96.6 F | HEIGHT: 76 IN | WEIGHT: 257 LBS | RESPIRATION RATE: 18 BRPM

## 2018-01-14 NOTE — MISCELLANEOUS
Message   Date: 04 Oct 2016 8:48 AM EST, Recorded By: Singh Kappa: April, Care Coordinator   Reason: Care Coordination   Received call from April at the MRI center on [facility] triage line  April states that she s/w the pt and he says that he s/w Dr Estuardo Padilla office yesterday and was told not to get his MRI done  April was advised that is not what pt was told, pt was advised to get bloodwork drawn prior to MRI, and that someone from the MRI center should be contacting him to set up appt  April verbalized understanding and was appreciative         April requesting GFR result, which was verbalized provided to her over the phone along w/ BUN and Creatinine results  April requesting that these results be faxed to her, and stated that these results will be good for two weeks, and the pt should be able to get scheduled in that time frame  April was advised will fax results at this time      BUN and Creatinine lab results faxed to April at [phone]         Active Problems    1  Abrasion Of Left Foot (917 0)   2  Abrasion of leg (916 0) (S80 819A)   3  Anemia (285 9) (D64 9)   4  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,440 9) (I25 10)   5  BPH without obstruction/lower urinary tract symptoms (600 00) (N40 0)   6  Chest discomfort (786 59) (R07 89)   7  Depression (311) (F32 9)   8  Diabetes mellitus type 2, uncontrolled (250 02) (E11 65)   9  Diabetic neuropathy, type II diabetes mellitus (250 60,357 2) (E11 40)   10  Diarrhea (787 91) (R19 7)   11  Diverticulosis (562 10) (K57 90)   12  Dyspnea (786 09) (R06 00)   13  Dysuria (788 1) (R30 0)   14  Eczema (692 9) (L30 9)   15  Elevated ALT measurement (790 4) (R74 0)   16  Elevated AST (SGOT) (790 4) (R74 0)   17  Encounter for screening colonoscopy (V76 51) (Z12 11)   18  Fatty liver (571 8) (K76 0)   19  Foot pain (729 5) (M79 673)   20  Generalized osteoarthritis of multiple sites (715 09) (M15 9)   21   GERD without esophagitis (530 81) (K21 9)   22  Gross hematuria (599 71) (R31 0)   23  Hyperlipidemia (272 4) (E78 5)   24  Hypertension (401 9) (I10)   25  Hypoglycemia (251 2) (E16 2)   26  Hypotension (458 9) (I95 9)   27  Insomnia (780 52) (G47 00)   28  Lower back pain (724 2) (M54 5)   29  Lumbar spondylosis (721 3) (M47 816)   30  Malignant tumor of urinary bladder (188 9) (C67 9)   31  Microhematuria (599 72) (R31 29)   32  Need for pneumococcal vaccination (V03 82) (Z23)   33  Need for prophylactic vaccination and inoculation against influenza (V04 81) (Z23)   34  Occult blood positive stool (792 1) (R19 5)   35  Organic impotence (607 84) (N52 9)   36  Other seasonal allergic rhinitis (477 8) (J30 2)   37  Pedal edema (782 3) (R60 0)   38  Peptic ulcer (533 90) (K27 9)   39  Preop examination (V72 84) (Z01 818)   40  Preop examination (V72 84) (Z01 818)   41  Pre-procedure lab exam (V72 63) (Z01 812)   42  Screening for genitourinary condition (V81 6) (Z13 89)   43  Screening for neurological condition (V80 09) (Z13 89)   44  Sleep apnea (780 57) (G47 30)   45  Thrombocytopenia (287 5) (D69 6)   46  Tinnitus of both ears (388 30) (H93 13)   47  Type 2 diabetes mellitus with hyperglycemia (250 00) (E11 65)   48  Urinary tract infection (599 0) (N39 0)   49  Vitamin D deficiency (268 9) (E55 9)    Current Meds   1  Accu-Chek Martha Plus In Vitro Strip; TEST 3 TIMES DAILY; Therapy: 34HYD6769 to (Evaluate:79Oyj5438)  Requested for: 15EPL0308; Last   Rx:41Yry5003 Ordered   2  Aspirin 81 MG TABS; TAKE 1 TABLET DAILY; Therapy: 90Doa8708 to Recorded   3  Azelastine HCl - 0 15 % Nasal Solution; INHALE 1 SPRAY Intranasally Twice daily; Therapy: 61NOT9554 to (Evaluate:11Mar2016)  Requested for: 43TWG2134; Last   Rx:14Wyh8999 Ordered   4  BD Insulin Syringe Ultrafine 31G X 5/16" 0 3 ML Miscellaneous; USE AS DIRECTED; Therapy: 59BVI2317 to (Elizabeth Bardales)  Requested for: 83PEC5800; Last   Rx:39Oce0588 Ordered   5   BD Pen Needle Short U/F 31G X 8 MM Miscellaneous; USE AND DISCARD 2 PEN        NEEDLES DAILY; Therapy: 17SXM2433 to (Evaluate:13Jun2016)  Requested for: 82PUE4276; Last   Rx:38Ijo6784 Ordered   6  Fluocinonide 0 05 % External Ointment; APPLY SPARINGLY TO AFFECTED AREA(S)   TWICE DAILY; Therapy: 41MQF1547 to (Evaluate:25Mar2014)  Requested for: 19MQV4896; Last   Rx:24Jan2014 Ordered   7  Fluticasone Propionate 50 MCG/ACT Nasal Suspension; use 2 sprays in each nostril   once daily; Therapy: 10CFE6457 to (Evaluate:18Jan2015)  Requested for: 98JGD1774; Last   Rx:19Nov2014 Ordered   8  Furosemide 20 MG Oral Tablet; TAKE ONE TABLET BY MOUTH EVERY DAY AS   DIRECTED; Therapy: 64KNW9476 to  Requested for: 18KQA0074 Recorded   9  Gabapentin 300 MG Oral Capsule; TAKE 2 CAPSULES AT BEDTIME NIGHTLY; Therapy: 52CCX1381 to (Evaluate:14Nov2016)  Requested for: 21EEN5122; Last   Rx:18Feb2016 Ordered   10  Isosorbide Mononitrate ER 30 MG Oral Tablet Extended Release 24 Hour; take 1 tablet    by mouth every day; Therapy: 45SSF8966 to (9593 5081)  Requested for: 69Amm4150; Last    Rx:72Hxz9515 Ordered   11  Lantus SoloStar 100 UNIT/ML Subcutaneous Solution Pen-injector; INJECT 55 UNITS    AT BEDTIME; Therapy: 80QLF0444 to Recorded   12  Lumigan 0 01 % Ophthalmic Solution; Therapy: 84QGG2435 to Recorded   13  MetFORMIN HCl - 1000 MG Oral Tablet; Take 2 tbs daily - PRESCRIBED AT [facility]; Therapy: 06TEC4737 to Recorded   14  Metoprolol Succinate  MG Oral Tablet Extended Release 24 Hour; TAKE 1    TABLET DAILY; Therapy: 84JSY6659 to (Aylin Fruits)  Requested for: 38XVF9134; Last    Rx:03Nov2015 Ordered   15  Multivitamins TABS; TAKE 1 TABLET DAILY; Therapy: 99Wqd3366 to Recorded   16  NovoLOG FlexPen 100 UNIT/ML Subcutaneous Solution Pen-injector; Use 15 units TID; Therapy: 01Apr2016 to (Evaluate:93Lvu1762)  Requested for: 86YAV9105 Recorded   17   Omeprazole 20 MG Oral Capsule Delayed Release; TAKE 1 CAPSULE Daily; Therapy: 92FFE5280 to (Evaluate:15Jun2016)  Requested for: 24XER2694; Last    Rx:01Puc1066 Ordered   18  OneTouch Ultra Blue In [employer]; Therapy: 99TVZ6129 to (Last Rx:13Oct2011)  Requested for: 13Oct2011 Ordered   19  Pepto-Bismol 262 MG Oral Tablet Chewable; one tablet every 4-6 hours when necessary    for diarrhea loose stools; Therapy: 31TNH9534 to (Last Rx:13Xql8062)  Requested for: 67YJU5491 Ordered   20  SLPG Compound Medication; Bimix: Papaverine 30 mg/ Phentolamine 0 5 mg/ ml;    Therapy: 15QXG8554 to (Last Rx:13Nov2014) Ordered   21  Vitamin D3 15945 UNIT Oral Capsule; TAKE 1 TABLET EVERY TWO WEEKS; Therapy: 86OLY5470 to (Evaluate:01Mar2017)  Requested for: 01KMJ8254; Last    Rx:18Mig0540 Ordered    Allergies    1  HumaLOG SOLN   2  Lipitor TABS   3  Simvastatin TABS   4   Adhesive Tape TAPE    Signatures   Electronically signed by : Brett Tracy RN; Oct  4 2016  8:54AM EST                       (Author)

## 2018-01-14 NOTE — MISCELLANEOUS
Message      Date: 08 Apr 2016 9:32 AM EST, Recorded By: Kostas Cruz   Calling For: Diallo Weston   Caller: Chris Quintero, Self   Phone: [phone] (Home)   Reason: Medical Complaint   patient was "choking" on some blood in his mouth this morning  I offered patient an appointment today but he declines stating he "will see how it goes over the weekend and if it happens again  will go to the ER or will call the office Monday"          Active Problems    1  Abrasion Of Left Foot (917 0)   2  Abrasion of leg (916 0) (S80 819A)   3  Anemia (285 9) (D64 9)   4  Atherosclerotic heart disease of native coronary artery without angina pectoris (414 01)   (I25 10)   5  BPH without obstruction/lower urinary tract symptoms (600 00) (N40 0)   6  Chest discomfort (786 59) (R07 89)   7  Depression (311) (F32 9)   8  Diabetes mellitus type 2, uncontrolled (250 02) (E11 65)   9  Diabetic neuropathy, type II diabetes mellitus (250 60,357 2) (E11 40)   10  Diarrhea (787 91) (R19 7)   11  Diverticulosis (562 10) (K57 90)   12  Dyspnea (786 09) (R06 00)   13  Dysuria (788 1) (R30 0)   14  Eczema (692 9) (L30 9)   15  Elevated ALT measurement (790 4) (R74 0)   16  Elevated AST (SGOT) (790 4) (R74 0)   17  Encounter for screening colonoscopy (V76 51) (Z12 11)   18  Fatty liver (571 8) (K76 0)   19  Foot pain (729 5) (M79 673)   20  Generalized osteoarthritis of multiple sites (715 09) (M15 9)   21  GERD without esophagitis (530 81) (K21 9)   22  Gross hematuria (599 71) (R31 0)   23  Hyperlipidemia (272 4) (E78 5)   24  Hypertension (401 9) (I10)   25  Hypoglycemia (251 2) (E16 2)   26  Hypotension (458 9) (I95 9)   27  Insomnia (780 52) (G47 00)   28  Malignant tumor of urinary bladder (188 9) (C67 9)   29  Microhematuria (599 72) (R31 2)   30  Need for pneumococcal vaccination (V03 82) (Z23)   31  Need for prophylactic vaccination and inoculation against influenza (V04 81) (Z23)   32   Occult blood positive stool (792 1) (R19 5) 33  Organic impotence (607 84) (N52 9)   34  Other seasonal allergic rhinitis (477 8) (J30 2)   35  Pedal edema (782 3) (R60 0)   36  Peptic ulcer (533 90) (K27 9)   37  Preop examination (V72 84) (Z01 818)   38  Screening for genitourinary condition (V81 6) (Z13 89)   39  Screening for neurological condition (V80 09) (Z13 89)   40  Sleep apnea (780 57) (G47 30)   41  Thrombocytopenia (287 5) (D69 6)   42  Tinnitus of both ears (388 30) (H93 13)   43  Type 2 diabetes mellitus with hyperglycemia (250 00) (E11 65)   44  Vitamin D deficiency (268 9) (E55 9)    Current Meds   1  Accu-Chek Martha Plus In Vitro Strip; TEST 3 TIMES DAILY; Therapy: 06YVX8744 to (Evaluate:91Xef8589)  Requested for: 86NLB0782; Last   Rx:77Nfp2444 Ordered   2  Aspirin 81 MG Oral Tablet; TAKE 1 TABLET DAILY; Therapy: 31Ieo1957 to Recorded   3  Azelastine HCl - 0 15 % Nasal Solution; INHALE 1 SPRAY Intranasally Twice daily; Therapy: 68WBP9170 to (Evaluate:11Mar2016)  Requested for: 23DHF5175; Last   Rx:81Yio4018 Ordered   4  BD Insulin Syringe Ultrafine 31G X 5/16" 0 3 ML Miscellaneous; USE AS DIRECTED; Therapy: 92MVI4038 to (Rabia Hemming)  Requested for: 61QTN6427; Last   Rx:13Tgf4679 Ordered   5  BD Pen Needle Short U/F 31G X 8 MM Miscellaneous; USE AND DISCARD 2 PEN        NEEDLES DAILY; Therapy: 62YTX1223 to (Evaluate:13Jun2016)  Requested for: 04OOZ1349; Last   Rx:17Ggd1149 Ordered   6  Fluocinonide 0 05 % External Ointment; APPLY SPARINGLY TO AFFECTED AREA(S)   TWICE DAILY; Therapy: 29APS0974 to (Evaluate:25Mar2014)  Requested for: 30BSV9216; Last   Rx:24Jan2014 Ordered   7  Fluticasone Propionate 50 MCG/ACT Nasal Suspension; use 2 sprays in each nostril   once daily; Therapy: 40XVQ3582 to (Evaluate:18Jan2015)  Requested for: 26TVQ6163; Last   Rx:19Nov2014 Ordered   8  Furosemide 20 MG Oral Tablet; TAKE ONE TABLET BY MOUTH EVERY DAY AS   DIRECTED; Therapy: 12KQB7155 to  Requested for: 43YZI2373 Recorded   9   Gabapentin 300 MG Oral Capsule; TAKE 2 CAPSULES AT BEDTIME NIGHTLY; Therapy: 72AAN4766 to (Evaluate:14Nov2016)  Requested for: 20VTW1644; Last   Rx:33Acz5335 Ordered   10  Hydrocodone-Acetaminophen 5-325 MG Oral Tablet; Therapy: 21ZSK8245 to Recorded   11  Isosorbide Mononitrate ER 30 MG Oral Tablet Extended Release 24 Hour; TAKE 1    TABLET DAILY; Therapy: 35MWU7529 to (Yarelis Leone)  Requested for: 79VFM5751; Last    Rx:03Nov2015 Ordered   12  Lumigan 0 01 % Ophthalmic Solution; Therapy: 28TGB9946 to Recorded   13  Metoprolol Succinate  MG Oral Tablet Extended Release 24 Hour; TAKE 1    TABLET DAILY; Therapy: 00NXN9322 to (Sergio Saeed)  Requested for: 67KGI6194; Last    Rx:03Nov2015 Ordered   14  Multivitamins TABS; TAKE 1 TABLET DAILY; Therapy: 46Dfo0225 to Recorded   15  NovoLOG FlexPen 100 UNIT/ML Subcutaneous Solution Pen-injector; Use 18 units TID; Therapy: 96Ziw5530 to (Sergio Saeed)  Requested for: 01Apr2016; Last    Rx:01Apr2016 Ordered   16  Omeprazole 20 MG Oral Capsule Delayed Release; TAKE 1 CAPSULE Daily; Therapy: 99HNU7738 to (Evaluate:15Jun2016)  Requested for: 32TYD3028; Last    Rx:08Yym4803 Ordered   17  OneTouch Ultra Blue In [employer]; Therapy: 93FVY3281 to (Last Rx:13Oct2011)  Requested for: 19Lje9296 Ordered   18  Pepto-Bismol 262 MG Oral Tablet Chewable; one tablet every 4-6 hours when necessary    for diarrhea loose stools; Therapy: 18WFV3575 to (Last Rx:64Smx4293)  Requested for: 43FIO3211 Ordered   19  SLPG Compound Medication; Bimix: Papaverine 30 mg/ Phentolamine 0 5 mg/ ml;    Therapy: 34ICA0124 to (Last Rx:13Nov2014) Ordered   20  Toujeo SoloStar 300 UNIT/ML Subcutaneous Solution Pen-injector; 60 units at 10 pm;    Therapy: 12TDH8842 to (Evaluate:89Ure5216)  Requested for: 93XQG5186; Last    Rx:05Jan2016 Ordered   21  TraMADol HCl - 50 MG Oral Tablet; take 1/2 - 1 tablet tid prn;     Therapy: 01CZH7019 to (Evaluate:13Jan2013)  Requested for: 22MPW7315; Last    Rx:14Nov2012 Ordered   22  Vitamin D3 70302 UNIT Oral Capsule; TAKE 1 TABLET EVERY TWO WEEKS; Therapy: 30OUU1593 to (Evaluate:10Aug2016)  Requested for: 51RIE4255 Recorded    Allergies    1  HumaLOG SOLN   2  Lipitor TABS   3  Simvastatin TABS   4   Adhesive Tape TAPE    Signatures   Electronically signed by : FLORESITA Talavera ; Apr 8 2016 12:22PM EST

## 2018-01-14 NOTE — MISCELLANEOUS
Message   Date: 04 Oct 2016 8:34 AM EST, Recorded By: Trey Sox: April, Care Coordinator   Reason: Care Coordination   Received a call from April at the MRI center  April states she never received a fax yesterday, was the order for the MRI ever adjusted to w/ and w/o contrast  April was advised that it was adjusted and I did refax yesterday  Confirmed fax number [phone]  Refaxed MRI order while April was on phone  April provided verbal confirmation that she did received fax this time  April appreciative  Active Problems    1  Abrasion Of Left Foot (917 0)   2  Abrasion of leg (916 0) (S80 819A)   3  Anemia (285 9) (D64 9)   4  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,440 9) (I25 10)   5  BPH without obstruction/lower urinary tract symptoms (600 00) (N40 0)   6  Chest discomfort (786 59) (R07 89)   7  Depression (311) (F32 9)   8  Diabetes mellitus type 2, uncontrolled (250 02) (E11 65)   9  Diabetic neuropathy, type II diabetes mellitus (250 60,357 2) (E11 40)   10  Diarrhea (787 91) (R19 7)   11  Diverticulosis (562 10) (K57 90)   12  Dyspnea (786 09) (R06 00)   13  Dysuria (788 1) (R30 0)   14  Eczema (692 9) (L30 9)   15  Elevated ALT measurement (790 4) (R74 0)   16  Elevated AST (SGOT) (790 4) (R74 0)   17  Encounter for screening colonoscopy (V76 51) (Z12 11)   18  Fatty liver (571 8) (K76 0)   19  Foot pain (729 5) (M79 673)   20  Generalized osteoarthritis of multiple sites (715 09) (M15 9)   21  GERD without esophagitis (530 81) (K21 9)   22  Gross hematuria (599 71) (R31 0)   23  Hyperlipidemia (272 4) (E78 5)   24  Hypertension (401 9) (I10)   25  Hypoglycemia (251 2) (E16 2)   26  Hypotension (458 9) (I95 9)   27  Insomnia (780 52) (G47 00)   28  Lower back pain (724 2) (M54 5)   29  Lumbar spondylosis (721 3) (M47 816)   30  Malignant tumor of urinary bladder (188 9) (C67 9)   31  Microhematuria (599 72) (R31 29)   32   Need for pneumococcal vaccination (V03 82) (Z23)   33  Need for prophylactic vaccination and inoculation against influenza (V04 81) (Z23)   34  Occult blood positive stool (792 1) (R19 5)   35  Organic impotence (607 84) (N52 9)   36  Other seasonal allergic rhinitis (477 8) (J30 2)   37  Pedal edema (782 3) (R60 0)   38  Peptic ulcer (533 90) (K27 9)   39  Preop examination (V72 84) (Z01 818)   40  Preop examination (V72 84) (Z01 818)   41  Pre-procedure lab exam (V72 63) (Z01 812)   42  Screening for genitourinary condition (V81 6) (Z13 89)   43  Screening for neurological condition (V80 09) (Z13 89)   44  Sleep apnea (780 57) (G47 30)   45  Thrombocytopenia (287 5) (D69 6)   46  Tinnitus of both ears (388 30) (H93 13)   47  Type 2 diabetes mellitus with hyperglycemia (250 00) (E11 65)   48  Urinary tract infection (599 0) (N39 0)   49  Vitamin D deficiency (268 9) (E55 9)    Current Meds   1  Accu-Chek Martha Plus In Vitro Strip; TEST 3 TIMES DAILY; Therapy: 31SBB5066 to (Evaluate:83Bxb4628)  Requested for: 88JLZ5777; Last   Rx:16Zmj3177 Ordered   2  Aspirin 81 MG TABS; TAKE 1 TABLET DAILY; Therapy: 71Cfw4264 to Recorded   3  Azelastine HCl - 0 15 % Nasal Solution; INHALE 1 SPRAY Intranasally Twice daily; Therapy: 45XYP7580 to (Evaluate:11Mar2016)  Requested for: 89DAM3176; Last   Rx:06Aup2464 Ordered   4  BD Insulin Syringe Ultrafine 31G X 5/16" 0 3 ML Miscellaneous; USE AS DIRECTED; Therapy: 89YGO0832 to (Abram Claros)  Requested for: 69WAA3067; Last   Rx:32Zym7812 Ordered   5  BD Pen Needle Short U/F 31G X 8 MM Miscellaneous; USE AND DISCARD 2 PEN        NEEDLES DAILY; Therapy: 81DDF6468 to (Evaluate:13Jun2016)  Requested for: 62EQG1503; Last   Rx:97Tcl3744 Ordered   6  Fluocinonide 0 05 % External Ointment; APPLY SPARINGLY TO AFFECTED AREA(S)   TWICE DAILY; Therapy: 24OKH2295 to (Evaluate:25Mar2014)  Requested for: 40YNU4168; Last   Rx:24Jan2014 Ordered   7   Fluticasone Propionate 50 MCG/ACT Nasal Suspension; use 2 sprays in each nostril   once daily; Therapy: 77UDV8757 to (Evaluate:18Jan2015)  Requested for: 26OQV2399; Last   Rx:19Nov2014 Ordered   8  Furosemide 20 MG Oral Tablet; TAKE ONE TABLET BY MOUTH EVERY DAY AS   DIRECTED; Therapy: 45GGQ9346 to  Requested for: 30SWX6549 Recorded   9  Gabapentin 300 MG Oral Capsule; TAKE 2 CAPSULES AT BEDTIME NIGHTLY; Therapy: 26JFI2140 to (Evaluate:14Nov2016)  Requested for: 80GRV8208; Last   Rx:18Feb2016 Ordered   10  Isosorbide Mononitrate ER 30 MG Oral Tablet Extended Release 24 Hour; take 1 tablet    by mouth every day; Therapy: 22AZQ4045 to ([phone])  Requested for: 76Eee7652; Last    Rx:82Ike8051 Ordered   11  Lantus SoloStar 100 UNIT/ML Subcutaneous Solution Pen-injector; INJECT 55 UNITS    AT BEDTIME; Therapy: 56RPY1014 to Recorded   12  Lumigan 0 01 % Ophthalmic Solution; Therapy: 30RFX0399 to Recorded   13  MetFORMIN HCl - 1000 MG Oral Tablet; Take 2 tbs daily - PRESCRIBED AT [facility]; Therapy: 67CSR9785 to Recorded   14  Metoprolol Succinate  MG Oral Tablet Extended Release 24 Hour; TAKE 1    TABLET DAILY; Therapy: 83HKX7300 to ([facility])  Requested for: 85XKB2973; Last    Rx:03Nov2015 Ordered   15  Multivitamins TABS; TAKE 1 TABLET DAILY; Therapy: 55Igl8537 to Recorded   16  NovoLOG FlexPen 100 UNIT/ML Subcutaneous Solution Pen-injector; Use 15 units TID; Therapy: 01Apr2016 to (Evaluate:32Uii8166)  Requested for: 91YKP5852 Recorded   17  Omeprazole 20 MG Oral Capsule Delayed Release; TAKE 1 CAPSULE Daily; Therapy: 58LKK4179 to (Evaluate:15Jun2016)  Requested for: 91ZWS8346; Last    Rx:71Hvn7850 Ordered   18  OneTouch Ultra Blue In [employer]; Therapy: 96RWS6750 to (Last Rx:13Oct2011)  Requested for: 13Oct2011 Ordered   19  Pepto-Bismol 262 MG Oral Tablet Chewable; one tablet every 4-6 hours when necessary    for diarrhea loose stools; Therapy: 21IFQ4794 to (Last Rx:39Yex5980)  Requested for: 60LAL8545 Ordered   20   SLPG Compound Medication; Bimix: Papaverine 30 mg/ Phentolamine 0 5 mg/ ml;    Therapy: 39YAO3660 to (Last Rx:13Nov2014) Ordered   21  Vitamin D3 11060 UNIT Oral Capsule; TAKE 1 TABLET EVERY TWO WEEKS; Therapy: 47VAY0080 to (Evaluate:01Mar2017)  Requested for: 58LGC1286; Last    Rx:36Nxu7714 Ordered    Allergies    1  HumaLOG SOLN   2  Lipitor TABS   3  Simvastatin TABS   4   Adhesive Tape TAPE    Signatures   Electronically signed by : Saúl Gregorio RN; Oct  4 2016  8:36AM EST                       (Author)

## 2018-01-15 NOTE — RESULT NOTES
Message   Recorded as Task   Date: 02/11/2016 06:55 AM, Created By: Jo Benz   Task Name: Follow Up   Assigned To: Cintia Rice   Regarding Patient: Ulysses Leiva, Status: In Progress   CatherineAshley Cruz - 11 Feb 2016 6:55 AM     TASK CREATED  rreview  Hemoglobin A1c is 9 7 which deteriorated the diabetic control possibly doing the holidays also with mildly elevated liver function studies consistent with fatty liver and elevated triglycerides send the report ofto his endocrinologistso they can adjust his diabetic regime  Vitamin D level is low at 21    Make sure he takes the vitamin D 3 50,000 units monthly if he doesn't have a prescription sent 1346 months    We'll see him in follow-up  The good news the electrolytes are normal on diuretics   Yaritza Pandya - 11 Feb 2016 8:45 AM     TASK EDITED  Spoke to pt and gave results  he states he has been taking the Vitamin D 50,000 u monthly  He also does not follow with endocrinology because he felt like they did not help him  Yaritza Pandya - 11 Feb 2016 8:45 AM     TASK IN PROGRESS   Yaritza Pandya - 12 Feb 2016 10:04 AM     TASK EDITED  After speaking with Dr Aisha Jaquez, pt was instructed to increase the Vitamin D to 50,000 u 1 tablet Q 2 weeks  Pt understood  Pt does not want to follow with endocrinology because he feels that Dr Aisha Jaquez does the same instruction as they do  He decided that he would watch his sweats intake after getting his Hgb A1C results  He will drop off his BS results to the office in one week

## 2018-01-15 NOTE — MISCELLANEOUS
Message   Date: 21 Apr 2017 12:32 PM EST, Recorded By: Holmes Schilder For: Dwain Rocheiot   Caller: Rahel Willard, Self   Phone: [phone] (Home)   Reason: Medical Complaint   Roldan Hernandes called today because he has the shivers and pain from the groin to the foot  He has 102 temperature  I gave him an appt today with Dr Claudine Jackson, but he was able to get a hold of the Podiatrist, which asked him to go to [facility]  He believes that he'll get admitted  Active Problems    1  Abrasion Of Left Foot (917 0)   2  Abrasion of leg (916 0) (S80 819A)   3  Anemia (285 9) (D64 9)   4  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,440 9) (I25 10)   5  BPH without obstruction/lower urinary tract symptoms (600 00) (N40 0)   6  Chest discomfort (786 59) (R07 89)   7  Chronic heel ulcer (707 14) (L97 409)   8  Depression (311) (F32 9)   9  Diabetes mellitus type 2, uncontrolled (250 02) (E11 65)   10  Diabetic neuropathy, type II diabetes mellitus (250 60,357 2) (E11 40)   11  Diabetic ulcer of right foot associated with type 2 diabetes mellitus (250 80,707 15)    (E11 621,L97 519)   12  Diarrhea (787 91) (R19 7)   13  Diverticulosis (562 10) (K57 90)   14  Dyspnea (786 09) (R06 00)   15  Dysuria (788 1) (R30 0)   16  Eczema (692 9) (L30 9)   17  Elevated ALT measurement (790 4) (R74 0)   18  Elevated AST (SGOT) (790 4) (R74 0)   19  Encounter for screening colonoscopy (V76 51) (Z12 11)   20  Fatty liver (571 8) (K76 0)   21  Foot pain (729 5) (M79 673)   22  Generalized osteoarthritis of multiple sites (715 09) (M15 9)   23  GERD without esophagitis (530 81) (K21 9)   24  Glaucoma screening (V80 1) (Z13 5)   25  Gross hematuria (599 71) (R31 0)   26  Hyperlipidemia (272 4) (E78 5)   27  Hypertension (401 9) (I10)   28  Hypoglycemia (251 2) (E16 2)   29  Hypotension (458 9) (I95 9)   30  Insomnia (780 52) (G47 00)   31  Lower back pain (724 2) (M54 5)   32  Lumbar spondylosis (721 3) (M47 816)   33   Malignant tumor of urinary bladder (188 9) (C67 9)   34  Microhematuria (599 72) (R31 29)   35  Need for pneumococcal vaccination (V03 82) (Z23)   36  Need for prophylactic vaccination and inoculation against influenza (V04 81) (Z23)   37  Non-healing surgical wound, initial encounter (998 83) (T81 89XA)   38  Non-healing surgical wound, subsequent encounter (V58 89,998 83) (T81 89XD)   39  Occult blood positive stool (792 1) (R19 5)   40  Organic impotence (607 84) (N52 9)   41  Other seasonal allergic rhinitis (477 8) (J30 2)   42  Pedal edema (782 3) (R60 0)   43  Peptic ulcer (533 90) (K27 9)   44  Preop examination (V72 84) (Z01 818)   45  Preop examination (V72 84) (Z01 818)   46  Pre-procedure lab exam (V72 63) (Z01 812)   47  Puncture wound of right foot, subsequent encounter (V58 89,892 0) (S91 331D)   48  Screening for genitourinary condition (V81 6) (Z13 89)   49  Screening for neurological condition (V80 09) (Z13 89)   50  Sleep apnea (780 57) (G47 30)   51  Thrombocytopenia (287 5) (D69 6)   52  Tinnitus of both ears (388 30) (H93 13)   53  Type 2 diabetes mellitus with hyperglycemia (250 00) (E11 65)   54  Ulcer On The Feet Dorsal Gonzalez Scale 3  (0-5)   55  Urinary tract infection (599 0) (N39 0)   56  Vitamin D deficiency (268 9) (E55 9)    Current Meds   1  Accu-Chek Martha Plus In Vitro Strip; CHECK BLOOD SUGARS 3 TIMES A DAY WITH   MEALS AND AT BEDTIME   DIA; Therapy: 93CIN4160 to ([phone])  Requested for: 2017; Last   Rx:65Oqd3900 Ordered   2  Aspirin 81 MG TABS; TAKE 1 TABLET DAILY; Therapy: 08Ybo2721 to Recorded   3  Azelastine HCl - 0 15 % Nasal Solution; INHALE 1 SPRAY Intranasally Twice daily; Therapy: 24CLL8901 to (Evaluate:2016)  Requested for: 36ACA5836; Last   Rx:28Wqw9944 Ordered   4  BD Insulin Syringe Ultrafine 31G X 5/16" 0 3 ML Miscellaneous; USE AS DIRECTED; Therapy: 53HZY6692 to ([phone])  Requested for: 45IQX2982; Last   Rx:81Uzs4573 Ordered   5   BD Pen Needle Short U/F 31G X 8 MM Miscellaneous; inject insulin five times daily; Therapy: 94WYA6426 to (Evaluate:85Utx8747)  Requested for: 99UAH1415; Last   Rx:06Apr2017 Ordered   6  Cephalexin 500 MG Oral Capsule; TAKE 1 CAPSULE 4 TIMES DAILY UNTIL GONE;   Therapy: 77MOW1654 to (Evaluate:09Apr2017)  Requested for: 90IYH1422; Last   Rx:30Mar2017 Ordered   7  Fluocinonide 0 05 % External Ointment; APPLY SPARINGLY TO AFFECTED AREA(S)   TWICE DAILY; Therapy: 68QNZ7259 to (Evaluate:25Mar2014)  Requested for: 73AWA5536; Last   Rx:24Jan2014 Ordered   8  Fluticasone Propionate 50 MCG/ACT Nasal Suspension; use 2 sprays in each nostril   once daily; Therapy: 12VRT0109 to (Evaluate:18Jan2015)  Requested for: 65ZRI8818; Last   Rx:19Nov2014 Ordered   9  Furosemide 20 MG Oral Tablet; TAKE ONE TABLET BY MOUTH EVERY DAY AS   DIRECTED; Therapy: 90XHR8214 to  Requested for: 17IPG3446 Recorded   10  Gabapentin 300 MG Oral Capsule; TAKE 2 CAPSULES AT BEDTIME NIGHTLY; Therapy: 56CIZ7294 to (Evaluate:03Sep2017)  Requested for: 43KKH4465; Last    Rx:07Mar2017 Ordered   11  Isosorbide Mononitrate ER 30 MG Oral Tablet Extended Release 24 Hour; take 1 tablet    by mouth every day; Therapy: 07CWJ0188 to (Xavi Vásquez)  Requested for: 42PDC3540; Last    Rx:05Jan2017 Ordered   12  Lantus SoloStar 100 UNIT/ML Subcutaneous Solution Pen-injector; Injected 55 units    subcutaneous at bedtime; Therapy: 97SVC9144 to (Evaluate:05Sep2017)  Requested for: 44AVY5206 Recorded   13  Loperamide HCl - 2 MG Oral Capsule; TAKE 1 CAPSULE BY MOUTH three times DAILY    prn; Therapy: 07RTU6094 to (Jeo Killer)  Requested for: 22QNI6855; Last    Rx:06Oct2016 Ordered   15  Lumigan 0 01 % Ophthalmic Solution; Therapy: 40ISY1744 to Recorded   15  Metoprolol Succinate  MG Oral Tablet Extended Release 24 Hour; TAKE 1    TABLET DAILY; Therapy: 75EHB7828 to (Diana Avila)  Requested for: 01ILC3500;  Last    QL:17WUJ9987 Ordered   16  Multivitamins TABS; TAKE 1 TABLET DAILY; Therapy: 00Qqi7017 to Recorded   17  NovoLOG FlexPen 100 UNIT/ML Subcutaneous Solution Pen-injector; Use 15 units TID; Therapy: 01Apr2016 to (Evaluate:95Uts6645)  Requested for: 67HYA4607 Recorded   18  Omeprazole 20 MG Oral Capsule Delayed Release; TAKE 1 CAPSULE Daily; Therapy: 96MYY2025 to (Evaluate:21Jun2017)  Requested for: 42Pug2263; Last    Rx:02Zyu6148 Ordered   19  Oxycodone-Acetaminophen 5-325 MG Oral Tablet; Therapy: (Recorded:09Mar2017) to Recorded   20  Pepto-Bismol 262 MG Oral Tablet Chewable; one tablet every 4-6 hours when necessary    for diarrhea loose stools; Therapy: 83WWK0619 to (Last Rx:07Vkz8191)  Requested for: 19HWC1527 Ordered   21  SLPG Compound Medication; Bimix: Papaverine 30 mg/ Phentolamine 0 5 mg/ ml;    Therapy: 08UYF8425 to (Last Rx:13Nov2014) Ordered   22  Vitamin D2 2000 UNIT Oral Tablet; Therapy: (Recorded:09Mar2017) to Recorded   23  Vitamin D3 74008 UNIT Oral Capsule; TAKE   3    tablets per month; Therapy: 33RAZ9426 to (Dandre King)  Requested for: 92PTE2848; Last    Rx:09Mar2017 Ordered    Allergies    1  HumaLOG SOLN   2  Lipitor TABS   3  Simvastatin TABS   4   Adhesive Tape TAPE    Signatures   Electronically signed by : FLORESITA Collins ; Apr 21 2017  2:03PM EST

## 2018-01-15 NOTE — MISCELLANEOUS
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,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) (I25 10)   5  BPH without obstruction/lower urinary tract symptoms (600 00) (N40 0)   6  Chest discomfort (786 59) (R07 89)   7  Chronic heel ulcer (707 14) (L97 409)   8  Depression (311) (F32 9)   9  Diabetes mellitus type 2, uncontrolled (250 02) (E11 65)   10  Diabetic neuropathy, type II diabetes mellitus (250 60,357 2) (E11 40)   11  Diabetic ulcer of right foot associated with type 2 diabetes mellitus (250 80,707 15)    (E11 621,L97 519)   12  Diarrhea (787 91) (R19 7)   13  Diverticulosis (562 10) (K57 90)   14  Dyspnea (786 09) (R06 00)   15  Dysuria (788 1) (R30 0)   16  Eczema (692 9) (L30 9)   17  Elevated ALT measurement (790 4) (R74 0)   18  Elevated AST (SGOT) (790 4) (R74 0)   19  Encounter for screening colonoscopy (V76 51) (Z12 11)   20  Fall, initial encounter (R097 6) (W19 XXXA)   21  Fatty liver (571 8) (K76 0)   22  Foot pain (729 5) (M79 673)   23  Generalized osteoarthritis of multiple sites (715 09) (M15 9)   24  GERD without esophagitis (530 81) (K21 9)   25  Glaucoma screening (V80 1) (Z13 5)   26  Gross hematuria (599 71) (R31 0)   27  Hyperlipidemia (272 4) (E78 5)   28  Hypertension (401 9) (I10)   29  Hypoglycemia (251 2) (E16 2)   30  Hypotension (458 9) (I95 9)   31  Insomnia (780 52) (G47 00)   32  Lower back pain (724 2) (M54 5)   33   Lumbar spondylosis (721 3) (M47 816)   34  Malignant tumor of urinary bladder (188 9) (C67 9)   35  Microhematuria (599 72) (R31 29)   36  Need for pneumococcal vaccination (V03 82) (Z23)   37  Need for prophylactic vaccination and inoculation against influenza (V04 81) (Z23)   38  Non-healing surgical wound, initial encounter (998 83) (T81 89XA)   39  Non-healing surgical wound, subsequent encounter (V58 89,998 83) (T81 89XD)   40  Occult blood positive stool (792 1) (R19 5)   41  Organic impotence (607 84) (N52 9)   42  Other seasonal allergic rhinitis (477 8) (J30 2)   43  Pedal edema (782 3) (R60 0)   44  Peptic ulcer (533 90) (K27 9)   45  Postoperative wound abscess, initial encounter (998 59) (T81 4XXA)   46  Preop examination (V72 84) (Z01 818)   47  Preop examination (V72 84) (Z01 818)   48  Pre-procedure lab exam (V72 63) (Z01 812)   49  Puncture wound of right foot, subsequent encounter (V58 89,892 0) (S91 331D)   50  Screening for genitourinary condition (V81 6) (Z13 89)   51  Screening for neurological condition (V80 09) (Z13 89)   52  Sleep apnea (780 57) (G47 30)   53  Thrombocytopenia (287 5) (D69 6)   54  Tinnitus of both ears (388 30) (H93 13)   55  Type 2 diabetes mellitus with hyperglycemia (250 00) (E11 65)   56  Ulcer On The Feet Dorsal Gonzalez Scale 3  (0-5)   57  Urinary tract infection (599 0) (N39 0)   58  Vitamin D deficiency (268 9) (E55 9)    Current Meds   1  Accu-Chek Martha Plus In Vitro Strip; CHECK BLOOD SUGARS 3 TIMES A DAY WITH   MEALS AND AT BEDTIME   DIA; Therapy: 14ZYN7826 to ([phone])  Requested for: 2017; Last   Rx:2017 Ordered   2  Aspirin 81 MG TABS; TAKE 1 TABLET DAILY; Therapy: 69Nwm7381 to Recorded   3  Azelastine HCl - 0 15 % Nasal Solution; INHALE 1 SPRAY Intranasally Twice daily; Therapy: 13ELH1949 to (Evaluate:2016)  Requested for: 74IEH9024; Last   Rx:13Gnp2387 Ordered   4   BD Insulin Syringe Ultrafine 31G X 5/16" 0 3 ML Miscellaneous; USE AS DIRECTED; Therapy: 13YTR3725 to (Arlyss Gowers)  Requested for: 52AOJ2683; Last   Rx:59Clw3223 Ordered   5  BD Pen Needle Short U/F 31G X 8 MM Miscellaneous; inject insulin five times daily; Therapy: 17NEW3937 to (Evaluate:05Jul2017)  Requested for: 62EAQ6413; Last   Rx:06Apr2017 Ordered   6  CeFAZolin Sodium 1-5 GM-% SOLN; USE AS DIRECTED; Therapy: (Recorded:99Ntt0476) to Recorded   7  Cephalexin 500 MG Oral Capsule; TAKE 1 CAPSULE 4 TIMES DAILY UNTIL GONE;   Therapy: 96NYN1213 to (Evaluate:09Apr2017)  Requested for: 55MQS3655; Last   Rx:30Mar2017 Ordered   8  Fluocinonide 0 05 % External Ointment; APPLY SPARINGLY TO AFFECTED AREA(S)   TWICE DAILY; Therapy: 61QIG8576 to (Evaluate:25Mar2014)  Requested for: 40MBI8996; Last   Rx:24Jan2014 Ordered   9  Fluticasone Propionate 50 MCG/ACT Nasal Suspension; use 2 sprays in each nostril   once daily; Therapy: 60DFF4893 to (Evaluate:18Jan2015)  Requested for: 17BKR3519; Last   Rx:19Nov2014 Ordered   10  Furosemide 20 MG Oral Tablet; TAKE 1 TABLET DAILY; Therapy: 78UVQ3850 to (Evaluate:15Jan2018)  Requested for: 59HTC8783; Last    Rx:60Exg1919 Ordered   11  Gabapentin 300 MG Oral Capsule; TAKE 1 CAPSULES AT BEDTIME NIGHTLY; Therapy: 04EAW1537 to (Evaluate:11Mar2018)  Requested for: 87Ban7873; Last    Rx:20Guu8535 Ordered   12  Isosorbide Mononitrate ER 30 MG Oral Tablet Extended Release 24 Hour; take 1 tablet    by mouth every day; Therapy: 09OFM6870 to (Evaluate:15Jan2018)  Requested for: 64WAB1566; Last    Rx:36Sfw8818 Ordered   13  Lantus SoloStar 100 UNIT/ML Subcutaneous Solution Pen-injector; Injected 55 units    subcutaneous at bedtime; Therapy: 24ZLE5586 to (Evaluate:15Jan2018)  Requested for: 30WQD7756; Last    Rx:89Vxh5345 Ordered   14  Loperamide HCl - 2 MG Oral Capsule; TAKE 1 CAPSULE BY MOUTH three times DAILY    prn; Therapy: 18BCA4891 to (Lupillo Callaway)  Requested for: 86VVD8499; Last    Rx:98Kco4452 Ordered   15   Lumigan 0 01 % Ophthalmic Solution; Therapy: 46MCO0651 to Recorded   16  Metoprolol Succinate  MG Oral Tablet Extended Release 24 Hour; TAKE 1    TABLET DAILY; Therapy: 33UKT9144 to (Bonita Ron)  Requested for: 84FYA6370; Last    Rx:03Nov2015 Ordered   17  Multivitamins TABS; TAKE 1 TABLET DAILY; Therapy: 05Uij3872 to Recorded   18  NovoLOG FlexPen 100 UNIT/ML Subcutaneous Solution Pen-injector; Use 15 units TID; Therapy: 01Apr2016 to (Evaluate:70Icp8339)  Requested for: 15OUH7723 Recorded   19  Omeprazole 20 MG Oral Capsule Delayed Release; TAKE 1 CAPSULE Daily; Therapy: 62QHX8370 to (Evaluate:15Jan2018)  Requested for: 43SSJ5417; Last    Rx:26Rfc9317 Ordered   20  Oxycodone-Acetaminophen 5-325 MG Oral Tablet; Therapy: (Recorded:09Mar2017) to Recorded   21  Pepto-Bismol 262 MG Oral Tablet Chewable; one tablet every 4-6 hours when necessary    for diarrhea loose stools; Therapy: 34BTL3532 to (Last Rx:51Que7042)  Requested for: 31RZM0459 Ordered   22  SLPG Compound Medication; Bimix: Papaverine 30 mg/ Phentolamine 0 5 mg/ ml;    Therapy: 27WKY9839 to (Last Rx:13Nov2014) Ordered   23  Vitamin D2 2000 UNIT Oral Tablet; Therapy: (Recorded:09Mar2017) to Recorded   24  Vitamin D3 10848 UNIT Oral Capsule; TAKE   3    tablets per month; Therapy: 86HNY0521 to (Evaluate:04Xfa3498)  Requested for: 99Syc6729; Last    Rx:56Bpi8129 Ordered    Allergies    1  HumaLOG SOLN   2  Lipitor TABS   3  Simvastatin TABS   4   Adhesive Tape TAPE    Signatures   Electronically signed by : FLORESITA Gaviria ; Sep 18 2017  3:12PM EST

## 2018-01-16 NOTE — MISCELLANEOUS
Message   Recorded as Task   Date: 10/03/2016 03:49 PM, Created By: Gertrudis Abbott   Task Name: Follow Up   Assigned To: [id number] [place] [address] clinical,Team   Regarding Patient: Rahel Willard, Status: In Progress   Quentin Patelr - 03 Oct 2016 3:49 PM     TASK CREATED  Can you please call him and advise him that his creatinine was high and he will not be able to have the MRI with and without contrast until he follows up with his PCP for further evaluation and receives their clearance to proceed with the MRI  SallyEmy - 73 Oct 2016 4:29 PM     TASK EDITED    Lorna****FYI******               I spoke with pt, advised above  Pt verbalizes understanding  **********   Lorna Washington - 05 Oct 2016 8:12 AM     TASK REPLIED TO: Previously Assigned To Juliet Venegas - 05 Oct 2016 9:03 AM     TASK IN PROGRESS        Active Problems    1  Abrasion Of Left Foot (917 0)   2  Abrasion of leg (916 0) (S80 819A)   3  Anemia (285 9) (D64 9)   4  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,440 9) (I25 10)   5  BPH without obstruction/lower urinary tract symptoms (600 00) (N40 0)   6  Chest discomfort (786 59) (R07 89)   7  Depression (311) (F32 9)   8  Diabetes mellitus type 2, uncontrolled (250 02) (E11 65)   9  Diabetic neuropathy, type II diabetes mellitus (250 60,357 2) (E11 40)   10  Diarrhea (787 91) (R19 7)   11  Diverticulosis (562 10) (K57 90)   12  Dyspnea (786 09) (R06 00)   13  Dysuria (788 1) (R30 0)   14  Eczema (692 9) (L30 9)   15  Elevated ALT measurement (790 4) (R74 0)   16  Elevated AST (SGOT) (790 4) (R74 0)   17  Encounter for screening colonoscopy (V76 51) (Z12 11)   18  Fatty liver (571 8) (K76 0)   19  Foot pain (729 5) (M79 673)   20  Generalized osteoarthritis of multiple sites (715 09) (M15 9)   21  GERD without esophagitis (530 81) (K21 9)   22  Gross hematuria (599 71) (R31 0)   23  Hyperlipidemia (272 4) (E78 5)   24  Hypertension (401 9) (I10)   25  Hypoglycemia (251 2) (E16 2)   26  Hypotension (458 9) (I95 9)   27  Insomnia (780 52) (G47 00)   28  Lower back pain (724 2) (M54 5)   29  Lumbar spondylosis (721 3) (M47 816)   30  Malignant tumor of urinary bladder (188 9) (C67 9)   31  Microhematuria (599 72) (R31 29)   32  Need for pneumococcal vaccination (V03 82) (Z23)   33  Need for prophylactic vaccination and inoculation against influenza (V04 81) (Z23)   34  Occult blood positive stool (792 1) (R19 5)   35  Organic impotence (607 84) (N52 9)   36  Other seasonal allergic rhinitis (477 8) (J30 2)   37  Pedal edema (782 3) (R60 0)   38  Peptic ulcer (533 90) (K27 9)   39  Preop examination (V72 84) (Z01 818)   40  Preop examination (V72 84) (Z01 818)   41  Pre-procedure lab exam (V72 63) (Z01 812)   42  Screening for genitourinary condition (V81 6) (Z13 89)   43  Screening for neurological condition (V80 09) (Z13 89)   44  Sleep apnea (780 57) (G47 30)   45  Thrombocytopenia (287 5) (D69 6)   46  Tinnitus of both ears (388 30) (H93 13)   47  Type 2 diabetes mellitus with hyperglycemia (250 00) (E11 65)   48  Urinary tract infection (599 0) (N39 0)   49  Vitamin D deficiency (268 9) (E55 9)    Current Meds   1  Accu-Chek Martha Plus In Vitro Strip; TEST 3 TIMES DAILY; Therapy: 65VCA8660 to (Evaluate:43Xro1915)  Requested for: 49NMK3080; Last   Rx:69Poh3015 Ordered   2  Aspirin 81 MG TABS; TAKE 1 TABLET DAILY; Therapy: 82Fwq6301 to Recorded   3  Azelastine HCl - 0 15 % Nasal Solution; INHALE 1 SPRAY Intranasally Twice daily; Therapy: 96BPP4438 to (Evaluate:11Mar2016)  Requested for: 65KAA3415; Last   Rx:16Dec9916 Ordered   4  BD Insulin Syringe Ultrafine 31G X 5/16" 0 3 ML Miscellaneous; USE AS DIRECTED; Therapy: 75XIH2287 to (Mouna Sebastian)  Requested for: 47BNI5927; Last   Rx:56Vlt7868 Ordered   5  BD Pen Needle Short U/F 31G X 8 MM Miscellaneous; USE AND DISCARD 2 PEN        NEEDLES DAILY;    Therapy: 99CHL6855 to (Evaluate:99Hyf1153)  Requested for: 97AGF4051; Last   Rx:01Piz3333 Ordered   6  Fluocinonide 0 05 % External Ointment; APPLY SPARINGLY TO AFFECTED AREA(S)   TWICE DAILY; Therapy: 81ZGI4203 to (Evaluate:25Mar2014)  Requested for: 37MKH8044; Last   Rx:24Jan2014 Ordered   7  Fluticasone Propionate 50 MCG/ACT Nasal Suspension; use 2 sprays in each nostril   once daily; Therapy: 84AKV5773 to (Evaluate:18Jan2015)  Requested for: 68XLG9016; Last   Rx:19Nov2014 Ordered   8  Furosemide 20 MG Oral Tablet; TAKE ONE TABLET BY MOUTH EVERY DAY AS   DIRECTED; Therapy: 74MRO0111 to  Requested for: 35KDD8888 Recorded   9  Gabapentin 300 MG Oral Capsule; TAKE 2 CAPSULES AT BEDTIME NIGHTLY; Therapy: 57VTN0806 to (Evaluate:14Nov2016)  Requested for: 06BRC3366; Last   Rx:50Xkt2618 Ordered   10  Isosorbide Mononitrate ER 30 MG Oral Tablet Extended Release 24 Hour; take 1 tablet    by mouth every day; Therapy: 12NCM8907 to (21 )  Requested for: 25Ccj1936; Last    Rx:54Imr9003 Ordered   11  Lantus SoloStar 100 UNIT/ML Subcutaneous Solution Pen-injector; INJECT 55 UNITS    AT BEDTIME; Therapy: 69WQT6568 to Recorded   12  Lumigan 0 01 % Ophthalmic Solution; Therapy: 46UFN9295 to Recorded   13  MetFORMIN HCl - 1000 MG Oral Tablet; Take 2 tbs daily - PRESCRIBED AT [facility]; Therapy: 02WYO6846 to Recorded   14  Metoprolol Succinate  MG Oral Tablet Extended Release 24 Hour; TAKE 1    TABLET DAILY; Therapy: 42RPK5998 to (Dony Bailey)  Requested for: 74PVH7920; Last    Rx:03Nov2015 Ordered   15  Multivitamins TABS; TAKE 1 TABLET DAILY; Therapy: 78Vjr4116 to Recorded   16  NovoLOG FlexPen 100 UNIT/ML Subcutaneous Solution Pen-injector; Use 15 units TID; Therapy: 01Apr2016 to (Evaluate:36Xbc6737)  Requested for: 72QIC2669 Recorded   17  Omeprazole 20 MG Oral Capsule Delayed Release; TAKE 1 CAPSULE Daily; Therapy: 59EYG7223 to (Evaluate:15Jun2016)  Requested for: 62KHK3923; Last    Rx:32Byl4045 Ordered   18  OneTouch Ultra Blue In [employer]; Therapy: 63DHY3400 to (Last Rx:13Oct2011)  Requested for: 13Oct2011 Ordered   19  Pepto-Bismol 262 MG Oral Tablet Chewable; one tablet every 4-6 hours when necessary    for diarrhea loose stools; Therapy: 99ITD2088 to (Last Rx:94Swc2053)  Requested for: 63DCZ3397 Ordered   20  SLPG Compound Medication; Bimix: Papaverine 30 mg/ Phentolamine 0 5 mg/ ml;    Therapy: 84ULS7240 to (Last Rx:13Nov2014) Ordered   21  Vitamin D3 95283 UNIT Oral Capsule; TAKE 1 TABLET EVERY TWO WEEKS; Therapy: 60JFL7225 to (Evaluate:01Mar2017)  Requested for: 99KOY4350; Last    Rx:79Ydf3333 Ordered    Allergies    1  HumaLOG SOLN   2  Lipitor TABS   3  Simvastatin TABS   4   Adhesive Tape TAPE    Signatures   Electronically signed by : Saúl Gregorio RN; Oct  5 2016  9:04AM EST                       (Author)

## 2018-01-16 NOTE — MISCELLANEOUS
Message   Recorded as Task   Date: 09/28/2016 10:19 AM, Created By: Alma Sprague   Task Name: Follow Up   Assigned To: [id number] [place] [address] clinical,Team   Regarding Patient: Corrina Munroe, Status: In Progress   Comment:    Brianna Caal - 28 Sep 2016 10:19 AM     TASK CREATED  Caller: Self; General Medical Question; [phone] (Home)  [facility] 9/28/2016 @ 0859  Pt calling re: MRI today at 1300  States he cannot tolerate a closed MRI  Advised by facility to cx and reschedule at an open MRI facility or s/w his doctor re: something for anxiety  Pt requesting a c/b  Ativan OK? 1 mg, 1 tab po 1 hr prior, repeat 30 min later prn #2 / 0 refills  Must have ? Brianna Caal - 28 Sep 2016 10:24 AM     TASK EDITED  s/w pt, states he has attempted closed mri w/ ativan in the past and could not complete the study  Advised pt, will confirm w/ Dr Nate Zimmer - Open MRI is ok and c/b to advise  Pt verbalized understanding and appreciation  ***Is open MRI ok? Delbert Jimenes - 28 Sep 2016 11:52 AM     TASK REPLIED TO: Previously Assigned To [id number] [place] [address] clinical,Team                      it's not ideal but it'll do, he should be able to use same script   Brianna Caal - 28 Sep 2016 1:02 PM     TASK EDITED  [facility] to cb   SallyEmy - 30 Sep 2016 10:51 AM     TASK EDITED                2nd Attempt to contact pt, LMOM for pt to [facility]  **********   SallyEmy - 30 Sep 2016 12:14 PM     TASK EDITED         [facility]          I spoke with pt, advised above  Pt asking if he could get medication to help him relax  I advised that per pt, he was not able to complete in the past with ativan  Pt proceeded to tell me that he was able to tolerate one when they put goggles on him with pictures of flowers  I advised that maybe he should go back there and see if he can tolerate it  Pt willing and will call if there is an issue!    Delbert Jimenes - 30 Sep 2016 12:21 PM     TASK REPLIED TO: Previously Assigned To Delbert Jimenes aware and agree, will need to get script for anxiolytic from Julia Funes or one of the other physicians in my absence if it's needed  thank you! Emy Zavala - 30 Sep 2016 1:18 PM     TASK EDITED                 Pt called, asking me to fax script to [address] [place] [address] at [phone], and they will call him to schedule  Script faxed  ***********        Active Problems    1  Abrasion Of Left Foot (917 0)   2  Abrasion of leg (916 0) (S80 819A)   3  Anemia (285 9) (D64 9)   4  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,440 9) (I25 10)   5  BPH without obstruction/lower urinary tract symptoms (600 00) (N40 0)   6  Chest discomfort (786 59) (R07 89)   7  Depression (311) (F32 9)   8  Diabetes mellitus type 2, uncontrolled (250 02) (E11 65)   9  Diabetic neuropathy, type II diabetes mellitus (250 60,357 2) (E11 40)   10  Diarrhea (787 91) (R19 7)   11  Diverticulosis (562 10) (K57 90)   12  Dyspnea (786 09) (R06 00)   13  Dysuria (788 1) (R30 0)   14  Eczema (692 9) (L30 9)   15  Elevated ALT measurement (790 4) (R74 0)   16  Elevated AST (SGOT) (790 4) (R74 0)   17  Encounter for screening colonoscopy (V76 51) (Z12 11)   18  Fatty liver (571 8) (K76 0)   19  Foot pain (729 5) (M79 673)   20  Generalized osteoarthritis of multiple sites (715 09) (M15 9)   21  GERD without esophagitis (530 81) (K21 9)   22  Gross hematuria (599 71) (R31 0)   23  Hyperlipidemia (272 4) (E78 5)   24  Hypertension (401 9) (I10)   25  Hypoglycemia (251 2) (E16 2)   26  Hypotension (458 9) (I95 9)   27  Insomnia (780 52) (G47 00)   28  Lower back pain (724 2) (M54 5)   29  Lumbar spondylosis (721 3) (M47 816)   30  Malignant tumor of urinary bladder (188 9) (C67 9)   31  Microhematuria (599 72) (R31 29)   32  Need for pneumococcal vaccination (V03 82) (Z23)   33  Need for prophylactic vaccination and inoculation against influenza (V04 81) (Z23)   34  Occult blood positive stool (792 1) (R19 5)   35   Organic impotence (607 84) (N52 9)   36  Other seasonal allergic rhinitis (477 8) (J30 2)   37  Pedal edema (782 3) (R60 0)   38  Peptic ulcer (533 90) (K27 9)   39  Preop examination (V72 84) (Z01 818)   40  Preop examination (V72 84) (Z01 818)   41  Screening for genitourinary condition (V81 6) (Z13 89)   42  Screening for neurological condition (V80 09) (Z13 89)   43  Sleep apnea (780 57) (G47 30)   44  Thrombocytopenia (287 5) (D69 6)   45  Tinnitus of both ears (388 30) (H93 13)   46  Type 2 diabetes mellitus with hyperglycemia (250 00) (E11 65)   47  Urinary tract infection (599 0) (N39 0)   48  Vitamin D deficiency (268 9) (E55 9)    Current Meds   1  Accu-Chek Martha Plus In Vitro Strip; TEST 3 TIMES DAILY; Therapy: 08RFU7694 to (Evaluate:92Yue5163)  Requested for: 69TAF0554; Last   Rx:78Ran5677 Ordered   2  Aspirin 81 MG TABS; TAKE 1 TABLET DAILY; Therapy: 19Aoj4970 to Recorded   3  Azelastine HCl - 0 15 % Nasal Solution; INHALE 1 SPRAY Intranasally Twice daily; Therapy: 13ITB5138 to (Evaluate:11Mar2016)  Requested for: 41GNN4112; Last   Rx:64Eoa8722 Ordered   4  BD Insulin Syringe Ultrafine 31G X 5/16" 0 3 ML Miscellaneous; USE AS DIRECTED; Therapy: 91BLA9246 to (490 08 485)  Requested for: 33YBQ9265; Last   Rx:34Kcn3978 Ordered   5  BD Pen Needle Short U/F 31G X 8 MM Miscellaneous; USE AND DISCARD 2 PEN        NEEDLES DAILY; Therapy: 26EVX0235 to (Evaluate:13Jun2016)  Requested for: 23OPO4708; Last   Rx:57Atx7803 Ordered   6  Fluocinonide 0 05 % External Ointment; APPLY SPARINGLY TO AFFECTED AREA(S)   TWICE DAILY; Therapy: 02KZS7768 to (Evaluate:25Mar2014)  Requested for: 71MTT0084; Last   Rx:24Jan2014 Ordered   7  Fluticasone Propionate 50 MCG/ACT Nasal Suspension; use 2 sprays in each nostril   once daily; Therapy: 90RJS8402 to (Evaluate:18Jan2015)  Requested for: 58QFU9117; Last   Rx:19Nov2014 Ordered   8  Furosemide 20 MG Oral Tablet; TAKE ONE TABLET BY MOUTH EVERY DAY AS   DIRECTED;    Therapy: 75QGR9778 to Requested for: 79AWL1564 Recorded   9  Gabapentin 300 MG Oral Capsule; TAKE 2 CAPSULES AT BEDTIME NIGHTLY; Therapy: 44JJW9683 to (Evaluate:14Nov2016)  Requested for: 66OPT8506; Last   Rx:52Ift5243 Ordered   10  Isosorbide Mononitrate ER 30 MG Oral Tablet Extended Release 24 Hour; take 1 tablet    by mouth every day; Therapy: 72BAR0093 to [phone])  Requested for: 38Cgh9216; Last    Rx:38Hvp8432 Ordered   11  Lantus SoloStar 100 UNIT/ML Subcutaneous Solution Pen-injector; INJECT 55 UNITS    AT BEDTIME; Therapy: 50CRB4435 to Recorded   12  Lumigan 0 01 % Ophthalmic Solution; Therapy: 37ZOX3997 to Recorded   13  MetFORMIN HCl - 1000 MG Oral Tablet; Take 2 tbs daily - PRESCRIBED AT [facility]; Therapy: 27IGZ3185 to Recorded   14  Metoprolol Succinate  MG Oral Tablet Extended Release 24 Hour; TAKE 1    TABLET DAILY; Therapy: 81ZPQ6085 to (Marlise Comfort)  Requested for: 96KTF7933; Last    Rx:03Nov2015 Ordered   15  Multivitamins TABS; TAKE 1 TABLET DAILY; Therapy: 75Duh5190 to Recorded   16  NovoLOG FlexPen 100 UNIT/ML Subcutaneous Solution Pen-injector; Use 15 units TID; Therapy: 01Apr2016 to (Evaluate:84Ppe3273)  Requested for: 27NSB6426 Recorded   17  Omeprazole 20 MG Oral Capsule Delayed Release; TAKE 1 CAPSULE Daily; Therapy: 52BIO2283 to (Evaluate:15Jun2016)  Requested for: 51LPA2792; Last    Rx:49Mta9737 Ordered   18  OneTouch Ultra Blue In [employer]; Therapy: 65YWU2784 to (Last Rx:13Oct2011)  Requested for: 11Kop0966 Ordered   19  Pepto-Bismol 262 MG Oral Tablet Chewable; one tablet every 4-6 hours when necessary    for diarrhea loose stools; Therapy: 79CLU6855 to (Last Rx:22Sgk6015)  Requested for: 64DKI6697 Ordered   20  SLPG Compound Medication; Bimix: Papaverine 30 mg/ Phentolamine 0 5 mg/ ml;    Therapy: 50IFF2253 to (Last Rx:13Nov2014) Ordered   21  Vitamin D3 06907 UNIT Oral Capsule; TAKE 1 TABLET EVERY TWO WEEKS;     Therapy: 17OHA5495 to (Evaluate:01Mar2017)  Requested for: 87Mtk0462; Last    Rx:20Ijg4944 Ordered    Allergies    1  HumaLOG SOLN   2  Lipitor TABS   3  Simvastatin TABS   4  Adhesive Tape TAPE    Signatures   Electronically signed by :  Carlos Garcia, ; Sep 30 2016  1:18PM EST                       (Author)

## 2018-01-16 NOTE — PROCEDURES
Assessment    1  Gross hematuria (599 71) (R31 0)   2  Malignant tumor of urinary bladder (188 9) (C67 9)    Plan   Gross hematuria    · Urine Dip Non-Automated- POC; Status:Complete - Retrospective By Protocol  Authorization;   Done: [id number] 08:47AM   Performed: In Office; ZSR:[id number]; Last Updated By:Latasha Rodrigues; 2016 8:48:50 AM;Ordered; For:Gross hematuria; Ordered By:Antonio Prince;  Gross hematuria, Malignant tumor of urinary bladder    · (1) URINALYSIS (will reflex a microscopy if leukocytes, occult blood, protein or nitrites are  not within normal limits); Status:Active - Retrospective By Protocol Authorization; Requested [id number];    Perform:[facility]; [facility]:[id number]; Last Updated Dionisio Alston; 2016 8:49:21 AM;Ordered; For:Gross hematuria, Malignant tumor of urinary bladder; Ordered By:Antonio Prince;   · (1) URINE CULTURE; Source:Urine, Clean Catch; Status:Active - Retrospective By  Protocol Authorization; Requested IRB:[id number];    Perform:[facility]; SMY:[id number]; Last Updated Dionisio Alston; 2016 8:49:21 AM;Ordered; For:Gross hematuria, Malignant tumor of urinary bladder; Ordered By:Antonio Prince;   · (1) URINE CYTOLOGY; Source:Urine, Clean Catch; Status:Active - Retrospective By  Protocol Authorization; Requested VNS:[id number];    Perform:[facility]; U:[id number]; Last Updated Dionisio Alston; 2016 8:49:21 AM;Ordered; For:Gross hematuria, Malignant tumor of urinary bladder; Ordered By:Antonio Prince;    Cystourethroscopy - POC; Status:Active; Requested for:2016;   Perform: In Office; Due:2017;Ordered; For:Malignant tumor of urinary bladder; Ordered By:Antonio Prince; Discussion/Summary  Discussion Summary:   He recently underwent bladder biopsy which revealed only chronic inflammation  Therefore we will not rush back to the operating room for this finding   He wishes to observe for now  We will recheck cystoscopy in 3 months to reevaluate  Understands and agrees with treatment plan: The treatment plan was reviewed with the patient/guardian  The patient/guardian understands and agrees with the treatment plan      Chief Complaint  Chief Complaint Free Text Note Form: pt presents cysto; gross hematuria      History of Present Illness  HPI: Patient seen in follow-up for 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  He has had no recent complaints, no urinary symptoms, no hematuria  He had a bladder biopsy in September because of erythematous tissue revealing only chronic inflammation  Review of Systems  Complete-Male Urology:   Constitutional: No fever or chills, feels well, no tiredness, no recent weight gain or weight loss  Respiratory: No complaints of shortness of breath, no wheezing, no cough, no SOB on exertion, no orthopnea or PND  Cardiovascular: No complaints of slow heart rate, no fast heart rate, no chest pain, no palpitations, no leg claudication, no lower extremity  Gastrointestinal: No complaints of abdominal pain, no constipation, no nausea or vomiting, no diarrhea or bloody stools  Genitourinary: Empty sensation and stream quality fair, but no dysuria, no urinary hesitancy, no hematuria, no incontinence and no feelings of urinary urgency    The patient presents with complaints of nocturia (1x)  Musculoskeletal: No complaints of arthralgia, no myalgias, no joint swelling or stiffness, no limb pain or swelling  Integumentary: No complaints of skin rash or skin lesions, no itching, no skin wound, no dry skin  Hematologic/Lymphatic: No complaints of swollen glands, no swollen glands in the neck, does not bleed easily, no easy bruising  Neurological: No compliants of headache, no confusion, no convulsions, no numbness or tingling, no dizziness or fainting, no limb weakness, no difficulty walking  Active Problems    1   Abrasion Of Left Foot (917 0) 2  Abrasion of leg (916 0) (S80 819A)   3  Acute sinusitis (461 9) (J01 90)   4  Anemia (285 9) (D64 9)   5  Atherosclerotic heart disease of native coronary artery without angina pectoris (414 01)   (I25 10)   6  BPH without obstruction/lower urinary tract symptoms (600 00) (N40 0)   7  Chest discomfort (786 59) (R07 89)   8  Depression (311) (F32 9)   9  Diabetes mellitus type 2, uncontrolled (250 02) (E11 65)   10  Diabetic neuropathy, type II diabetes mellitus (250 60,357 2) (E11 40)   11  Diarrhea (787 91) (R19 7)   12  Diverticulosis (562 10) (K57 90)   13  Dyspnea (786 09) (R06 00)   14  Dysuria (788 1) (R30 0)   15  Eczema (692 9) (L30 9)   16  Elevated ALT measurement (790 4) (R74 0)   17  Elevated AST (SGOT) (790 4) (R74 0)   18  Encounter for screening colonoscopy (V76 51) (Z12 11)   19  Fatty liver (571 8) (K76 0)   20  Foot pain (729 5) (M79 673)   21  Generalized osteoarthritis of multiple sites (715 09) (M15 9)   22  GERD without esophagitis (530 81) (K21 9)   23  Gross hematuria (599 71) (R31 0)   24  Hyperlipidemia (272 4) (E78 5)   25  Hypertension (401 9) (I10)   26  Hypoglycemia (251 2) (E16 2)   27  Hypotension (458 9) (I95 9)   28  Insomnia (780 52) (G47 00)   29  Malignant tumor of urinary bladder (188 9) (C67 9)   30  Microhematuria (599 72) (R31 2)   31  Need for pneumococcal vaccination (V03 82) (Z23)   32  Need for prophylactic vaccination and inoculation against influenza (V04 81) (Z23)   33  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)   34  Pedal edema (782 3) (R60 0)   35  Peptic ulcer (533 90) (K27 9)   36  Preop examination (V72 84) (Z01 818)   37  Screening for genitourinary condition (V81 6) (Z13 89)   38  Screening for neurological condition (V80 09) (Z13 89)   39  Sleep apnea (780 57) (G47 30)   40  Thrombocytopenia (287 5) (D69 6)   41  Tinnitus of both ears (388 30) (H93 13)   42  Type 2 diabetes mellitus with hyperglycemia (250 00) (E11 65)   43   Urinary tract infection (599 0) (N39 0)   44  Viral gastroenteritis (008 8) (A08 4)   45  Vitamin D deficiency (268 9) (E55 9)    Past Medical History    1  History of Bladder Cancer (V10 51)   2  History of transient cerebral ischemia (V12 54) (Z86 73)   3  History of urinary tract infection (V13 02) (Z87 440)   4  Need for prophylactic vaccination and inoculation against influenza (V04 81) (Z23)   5  History of Preop examination (V72 84) (H26 299)    Surgical History    1  History of Cath Stent Placement   2  History of Diagnostic Cystoscopy   3  History of Gastric Surgery For Morbid Obesity Bypass With Shaji-en-Y   4  History of Interventional Cardiac Catheterization   5  History of Intestinal Surgery Shaji-en-Y   6  History of Knee Arthroplasty    Family History    1  Family history of Diabetes   2  Family history of Heart disease   3  Family history of High blood pressure    4  Family history of Heart disease    5  Family history of Diabetes   6  Family history of High blood pressure   7  Family history of Kidney disease    8  Family history of Heart disease   9  Family history of High blood pressure    Social History    · Former smoker (A06 22) (D48 443)    Current Meds   1  Accu-Chek Martha Plus In Vitro Strip; TEST 3 TIMES DAILY; Therapy: 18TON4734 to (Evaluate:90Cam5286)  Requested for: 49GPC5767; Last   Rx:64Gys1911 Ordered   2  Apidra SoloStar 100 UNIT/ML Subcutaneous Solution Pen-injector; inject 18 u with    breakfast, lunch and dinner; Therapy: 78GOZ9969 to (Evaluate:46Rjp9680)  Requested for: 68KCG8112; Last   Rx:55Ilb7790 Ordered   3  Aspirin 81 MG Oral Tablet; TAKE 1 TABLET DAILY; Therapy: 62Pfc4093 to Recorded   4  BD Insulin Syringe Ultrafine 31G X 5/16" 0 3 ML Miscellaneous; USE AS DIRECTED; Therapy: 10UNE4691 to (490 08 485)  Requested for: 29NFK0108; Last   Rx:85Pbt6999 Ordered   5  BD Pen Needle Short U/F 31G X 8 MM Miscellaneous; USE AND DISCARD 2 PEN        NEEDLES DAILY;    Therapy: 93KZJ0935 to (Evaluate:13Jun2016)  Requested for: 78IET6427; Last   Rx:16Dec2015 Ordered   6  Fluocinonide 0 05 % External Ointment; APPLY SPARINGLY TO AFFECTED AREA(S)   TWICE DAILY; Therapy: 65LJC1495 to (Evaluate:25Mar2014)  Requested for: 70AWZ2331; Last   Rx:24Jan2014 Ordered   7  Fluticasone Propionate 50 MCG/ACT Nasal Suspension; use 2 sprays in each nostril   once daily; Therapy: 84QEC4051 to (Evaluate:18Jan2015)  Requested for: 11ISI1175; Last   Rx:19Nov2014 Ordered   8  Furosemide 20 MG Oral Tablet; TAKE 1 TABLET EVERY OTHER DAY; Therapy: 26MVB8152 to (Last Rx:05Jan2016)  Requested for: 58MNS9810 Ordered   9  Gabapentin 300 MG Oral Capsule; TAKE 2 CAPSULES AT BEDTIME NIGHTLY; Therapy: 62EPF1895 to (Evaluate:16Feb2016)  Requested for: 15QIN8605; Last   Rx:18Ylz9597 Ordered   10  Hydrocodone-Acetaminophen 5-325 MG Oral Tablet; Therapy: 18BZM0379 to Recorded   11  Isosorbide Mononitrate ER 30 MG Oral Tablet Extended Release 24 Hour; TAKE 1    TABLET DAILY; Therapy: 70HAK7601 to (Aileen Urban)  Requested for: 26FJG2636; Last    Rx:03Nov2015 Ordered   12  Lumigan 0 01 % Ophthalmic Solution; Therapy: 87YEM3357 to Recorded   13  Metoprolol Succinate  MG Oral Tablet Extended Release 24 Hour; TAKE 1 TABLET    DAILY; Therapy: 72QGD9689 to (Severa Coupe)  Requested for: 12CDK6755; Last    Rx:03Nov2015 Ordered   14  Multivitamins TABS; TAKE 1 TABLET DAILY; Therapy: 23Wqs3741 to Recorded   15  Omeprazole 20 MG Oral Capsule Delayed Release; TAKE 1 CAPSULE Daily; Therapy: 42ZIU3347 to (Evaluate:15Jun2016)  Requested for: 94OGH3792; Last    Rx:06Esi5656 Ordered   16  OneTouch Ultra Blue In [employer]; Therapy: 02BRH4500 to (Last Rx:13Oct2011)  Requested for: 13Oct2011 Ordered   17  Pepto-Bismol 262 MG Oral Tablet Chewable; one tablet every 4-6 hours when necessary    for diarrhea loose stools; Therapy: 19ZXX8958 to (Last Rx:88Qqw7934)  Requested for: 77NXQ8503 Ordered   18   SLPG Compound Medication; Bimix: Papaverine 30 mg/ Phentolamine 0 5 mg/ ml;    Therapy: 15GUJ5229 to (Last Rx:2014) Ordered   19  Toujeo SoloStar 300 UNIT/ML Subcutaneous Solution Pen-injector; 60 units at 10 pm;    Therapy: 77SBH1787 to (Evaluate:52Qzd6271)  Requested for: 09VXG4478; Last    Rx:2016 Ordered   20  TraMADol HCl - 50 MG Oral Tablet; take 1/2 - 1 tablet tid prn; Therapy: 15TWX1304 to (Evaluate:2013)  Requested for: 14GPF5079; Last    Rx:2012 Ordered   21  Vitamin D3 85953 UNIT Oral Capsule; TAKE 1 TABLET MONTHLY; Therapy: 94RPA7842 to (525)  Requested for: 34RPD5617; Last    TM:42MRZ6098 Ordered  Medication List Reviewed: The medication list was reviewed and updated today  Allergies    1  HumaLOG SOLN   2  Lipitor TABS   3  Simvastatin TABS   4  Adhesive Tape TAPE    Vitals  Vital Signs [Data Includes: Current Encounter]    Recorded: [id number] 08:32AM   Heart Rate 88   Systolic 669   Diastolic 80   Height 6 ft 4 in   Weight 262 lb 4 00 oz   BMI Calculated 31 92   BSA Calculated 2 48     Results/Data  Encounter Results   ECHO COMPLETE WITH CONTRAST IF INDICATED 34YSG1893 02:45PM Ning Steiner     Test Name Result Flag Reference   ECHO COMPLETE WITH CONTRAST IF INDICATED (Report)     [address]   [address]  [place], [address]   [phone]     Transthoracic Echocardiogram   2D, M-mode, Doppler, and Color Doppler     Study date: 2016     Patient: Cesar Gutierrez   MR number: [id number]   Account number: [de-identified]   : 1945   Age: 65 years   Gender: Male   Status: Outpatient   Location: [facility]   Height: 79 in   Weight: 266 lb   BP: 138/ 90 mmHg     Indications: Known Coronary artery disease       Diagnoses: 414 01 - CRNRY ATHRSCL NATVE VSSL     Sonographer: TARA Hernandez   Primary Physician: Haley Bowen MD   Group: [facility]   Interpreting Physician: Jaelyn Mosher MD     SUMMARY     LEFT VENTRICLE:   Systolic function was normal by visual assessment  Ejection fraction was   estimated to be 60 %  There were no regional wall motion abnormalities  Wall thickness was mildly increased  There was mild concentric hypertrophy  Doppler parameters were consistent with abnormal left ventricular relaxation   (grade 1 diastolic dysfunction)  LEFT ATRIUM:   The atrium was mildly dilated  MITRAL VALVE:   There was mild annular calcification  There was mild regurgitation  AORTIC VALVE:   Leaflets exhibited sclerosis  There was trace regurgitation  TRICUSPID VALVE:   There was mild regurgitation  Pulmonary artery systolic pressure was within the normal range  Estimated peak PA pressure was 28 mmHg  PULMONIC VALVE:   There was trace regurgitation  Not well visualized  HISTORY: PRIOR HISTORY: Myocardial infarction  Risk factors: hypertension,   insulin-controlled diabetes, and medication-treated hypercholesterolemia  PRIOR   PROCEDURES: Stents in 2006/07  PROCEDURE: The study was performed in the [address] [place] [address]  This   was a routine study  The transthoracic approach was used  The study included   complete 2D imaging, M-mode, complete spectral Doppler, and color Doppler  The   heart rate was 63 bpm, at the start of the study  Image quality was adequate  LEFT VENTRICLE: Size was normal  Systolic function was normal by visual   assessment  Ejection fraction was estimated to be 60 %  There were no regional   wall motion abnormalities  Wall thickness was mildly increased  There was mild   concentric hypertrophy  No evidence of apical thrombus  DOPPLER: Doppler   parameters were consistent with abnormal left ventricular relaxation (grade 1   diastolic dysfunction)  RIGHT VENTRICLE: The size was normal  Systolic function was normal  Wall   thickness was normal      LEFT ATRIUM: The atrium was mildly dilated  RIGHT ATRIUM: Size was normal      MITRAL VALVE: There was mild annular calcification  Valve structure was normal    There was normal leaflet separation  DOPPLER: The transmitral velocity was   within the normal range  There was no evidence for stenosis  There was mild   regurgitation  AORTIC VALVE: Leaflets exhibited sclerosis  DOPPLER: Transaortic velocity was   within the normal range  There was no evidence for stenosis  There was trace   regurgitation  TRICUSPID VALVE: The valve structure was normal  There was normal leaflet   separation  DOPPLER: The transtricuspid velocity was within the normal range  There was no evidence for stenosis  There was mild regurgitation  Pulmonary   artery systolic pressure was within the normal range  Estimated peak PA   pressure was 28 mmHg  PULMONIC VALVE: Not well visualized  DOPPLER: The transpulmonic velocity was   within the normal range  There was trace regurgitation  PERICARDIUM: There was no pericardial effusion  The pericardium was normal in   appearance  AORTA: The root exhibited normal size  SYSTEMIC VEINS: IVC: The inferior vena cava was normal in size       SYSTEM MEASUREMENT TABLES     2D   %FS: 26 6 %   AV Diam: 3 1 cm   EDV(Teich): 92 9 ml   EF(Teich): 52 2 %   ESV(Cube): 36 2 ml   ESV(Teich): 44 4 ml   IVSd: 1 2 cm   LA Area: 14 4 cm2   LA Diam: 4 2 cm   LVEDV MOD A4C: 110 5 ml   LVEF MOD A4C: 60 1 %   LVESV MOD A4C: 44 1 ml   LVIDd: 4 5 cm   LVIDs: 3 3 cm   LVLd A4C: 8 7 cm   LVLs A4C: 7 cm   LVPWd: 1 2 cm   RA Area: 11 4 cm2   Rv Diam: 3 4 cm   SV MOD A4C: 66 4 ml   SV(Cube): 55 5 ml   SV(Teich): 48 5 ml     CW   TR Vmax: 2 1 m/s   TR maxP 9 mmHg     MM   TAPSE: 2 cm     PW   AVC: 358 7 ms   E': 0 m/s   E/E': 9 7   MV A Scott: 0 7 m/s   MV Dec Henry: 1 2 m/s2   MV DecT: 380 1 ms   MV E Scott: 0 5 m/s   MV E/A Ratio: 0 7     [employer] Accredited Echocardiography Laboratory     Prepared and electronically signed by Kia Fuentes MD   Signed 12-Jan-2016 14:18:34       Procedure    Procedure: diagnostic cystourethroscopy  Indications for the procedure include Bladder Cancer  Risks, risk of bleeding and infection risks were discussed with the patient  Written consent was obtained prior to the procedure and is detailed in the patient's record  Anesthesia: the urethra was lubricated with 2% lidocaine gel  Procedure Note:    The patient was prepped and draped in the usual sterile fashion using betadine  The periurethral area was exposed and a lubricated 16 Greek flexible cystoscope was introduced into the urethral meatus  The urethra was normal  The prostate was visualized at the proximal urethra and bladder neck and the prostate appeared normal  All regions of the bladder were systematically inspected and the bladder appeared abnormal and 3 small erythematous areas were identified, one at the dome and 2 at the left floor towards the bladder neck  No obvious mass lesions  Post-procedure: the bladder was drained and the cystoscope was removed      Future Appointments    Date/Time Provider Specialty Site   02/10/2016 07:45 AM Melford Bloch, M D   Internal Medicine [facility]     Signatures   Electronically signed by : FLORESITA Estrada ; Jan 18 2016  9:34AM EST                       (Author)

## 2018-01-17 NOTE — PROGRESS NOTES
Preliminary Nursing Report                Patient Information    Initial Encounter Entry Date:   2016 4:08 PM EST (Automated Transmission Automated Transmission)       Last Modified:   {Yariel Escobar}              Legal Name: Casey Mack        Social Security Number:        YOB: 1945        Age (years): 66        Gender: M        Body Mass Index (BMI): 32 kg/m2        Height: 76 in  Weight: 261 lbs (118 kgs)           Address:   [address] [place] [address]              Phone: [phone]   (consent to leave messages)        Email:        Ethnicity: Decline to State        Scientology:        Marital Status:        Preferred Language: English        Race: Other Race                    Patient Insurance Information        Primary Insurance Information Carrier Name: {Primary  CarrierName}           Carrier Address:   {Primary  CarrierAddress}              Carrier Phone: {Primary  CarrierPhone}          Group Number: {Primary  GroupNumber}          Policy Number: {Primary  PolicyNumber}          Insured Name: {Primary  InsuredName}          Insured : {Primary  InsuredDOB}          Relationship to Insured: {Primary  RelationshiptoInsured}           Secondary Insurance Information Carrier Name: {Secondary  CarrierName}           Carrier Address:   {Secondary  CarrierAddress}              Carrier Phone: {Secondary  CarrierPhone}          Group Number: {Secondary  GroupNumber}          Policy Number: {Secondary  PolicyNumber}          Insured Name: {Secondary  InsuredName}          Insured : {Secondary  InsuredDOB}          Relationship to Insured: {Secondary  RelationshiptoInsured}                       Health Profile   Booking #:   [facility] Duty #: [id number]               DOS: 2016    Surgery : CYSTOURETHROSCOPY, WITH BIOPSY(S)    Add'l Procedures/Notes:     Surgery Risk: Minor          Precautions     Chest discomfort       Diabetes mellitus type 2, uncontrolled Diabetic neuropathy, type II diabetes mellitus       Sleep apnea       Type 2 diabetes mellitus with hyperglycemia                   Allergies    Adhesive Tape TAPE       Clinical Comments: Reaction Type: , Reaction: , Severity:        HumaLOG SOLN       Clinical Comments: Reaction Type: , Reaction: , Severity:        Lipitor TABS       Clinical Comments: Reaction Type: , Reaction: , Severity:        Simvastatin TABS       Clinical Comments: Reaction Type: , Reaction: , Severity:              Medications    Aspirin 81 MG TABS       Azelastine HCl - 0 15 % Nasal Solution       Fluocinonide 0 05 % External Ointment       Fluticasone Propionate 50 MCG/ACT Nasal Suspension       Furosemide 20 MG Oral Tablet       Gabapentin 300 MG Oral Capsule       Isosorbide Mononitrate ER 30 MG Oral Tablet Extended Release 24 Hour       Lantus SoloStar 100 UNIT/ML Subcutaneous Solution Pen-injector       Lumigan 0 01 % Ophthalmic Solution       MetFORMIN HCl - 1000 MG Oral Tablet       Metoprolol Succinate  MG Oral Tablet Extended Release 24 Hour       Multivitamins TABS       NovoLOG FlexPen 100 UNIT/ML Subcutaneous Solution Pen-injector       Omeprazole 20 MG Oral Capsule Delayed Release       Pepto-Bismol 262 MG Oral Tablet Chewable       SLPG Compound Medication       Vitamin D3 41643 UNIT Oral Capsule               Conditions    Abrasion Of Left Foot       Abrasion of leg       Anemia       Arteriosclerotic cardiovascular disease       BPH without obstruction/lower urinary tract symptoms       Chest discomfort       Depression       Diabetes mellitus type 2, uncontrolled       Diabetic neuropathy, type II diabetes mellitus       Diarrhea       Diverticulosis       Dyspnea       Dysuria       Eczema       Elevated ALT measurement       Elevated AST (SGOT)       Encounter for screening colonoscopy       Fatty liver       Foot pain       Generalized osteoarthritis of multiple sites       GERD without esophagitis       Gross hematuria       Hyperlipidemia       Hypertension       Hypoglycemia       Hypotension       Insomnia       Malignant neoplasm of bladder       Microhematuria       Need for pneumococcal vaccination       Need for prophylactic vaccination and inoculation against influenza       Occult blood positive stool       Organic impotence       Other seasonal allergic rhinitis       Pedal edema       Peptic ulcer       Preop examination       Screening for genitourinary condition       Screening for neurological condition       Sleep apnea       Thrombocytopenia       Tinnitus of both ears       Type 2 diabetes mellitus with hyperglycemia       Urinary tract infection       Vitamin D deficiency               Family History    None             Surgical History    None             Social History    Former smoker                               Patient Instructions       ? NPO Instructions   The day before surgery it is recommended to have a light dinner at your usual time and you are allowed a light snack early in the evening  Do not eat anything heavy or eat a big meal after 7pm  Do not eat or drink anything after midnight prior to your surgery  If you are supposed to take any of your medications, do so with a sip of water  Failure to follow these instructions can lead to an increased risk of lung complications and may result in a delay or cancellation of your procedure  If you have any questions, contact your institution for further instructions  No candy, no gum, no mints, no chewing tobacco   Triggered by: Medical Procedure Risk         ? Beta Blocker 3, 2, c, 4, 6, 5  Please continue to take this medication on your normal schedule  If this is an oral medication and you take in the morning, you may do so with a sip of water  Triggered by: Metoprolol Succinate  MG Oral Tablet Extended Release 24 Hour         ? Diabetic Medication   Please decrease your morning insulin dose to one-half of normal dose   If you are taking oral diabetes medications, the morning dose should be omitted  If taking metformin (Glucophage), discontinue the medication for 24 hours prior to surgery  If you have an insulin pump, continue at a basal rate only  Triggered by: MetFORMIN HCl - 1000 MG Oral Tablet         ? Diuretics (Water Pills) 28, 29  Please continue the following medications up to the evening before surgery, but do not take it on the day of surgery  Triggered by: Furosemide 20 MG Oral Tablet         ? Neurological Medication 8  Please continue to take this medication on your normal schedule  If this is an oral medication and you take in the morning, you may do so with a sip of water  Triggered by: Gabapentin 300 MG Oral Capsule         ? Nitroglycerin   Please continue your nitroglycerin as directed, and notify your physician if you have needed to increase the frequency or dosage  Triggered by: Isosorbide Mononitrate ER 30 MG Oral Tablet Extended Release 24 Hour         ? Reflux Disease   Please continue to take this medication on your normal schedule  If this is an oral medication and you take in the morning, you may do so with a sip of water  Triggered by: GERD without esophagitis, Peptic ulcer         ? Sleep Apnea   Please notify surgeon and anesthesiologist if you have sleep apnea, severe snoring, or daytime somnolence  For those sleep apnea patients with continuous positive airway pressure (CPAP or BiPAP) machines, please bring your machine to the hospital on the day of surgery  Triggered by: Sleep apnea               Testing Considerations       ? Blood Glucose on Day of Surgery t  Please check the blood sugar on the morning of surgery  Triggered by: Diabetic neuropathy, type II diabetes mellitus, Type 2 diabetes mellitus with hyperglycemia, Diabetes mellitus type 2, uncontrolled, Hypoglycemia               Consultations       ?  Cardiac Consult (Major MI) c  If the patient has had a Myocardial Infarction within 30 days then a cardiac consult is indicated  Also, if the patient is having increasing frequency or intensity of chest pain then a cardiac consult is indicated  Otherwise, optimization of medical therapy is recommended under the direction of the PCP or cardiologist   Triggered by: Chest discomfort               Miscellaneous Questions         Question: Are you able to walk up a flight of stairs, walk up a hill or do heavy housework WITHOUT having chest pain or shortness of breath? Answer: YES                   Allergies/Conditions/Medications Not Found        The following were not recognized by our system when generating the recommendations  Please consider if this would impact any preoperative protocols  ? Abrasion Of Left Foot       ? Screening for neurological condition       ? Aspirin 81 MG TABS       ? Azelastine HCl - 0 15 % Nasal Solution       ? Fluocinonide 0 05 % External Ointment       ? Fluticasone Propionate 50 MCG/ACT Nasal Suspension       ? Lantus SoloStar 100 UNIT/ML Subcutaneous Solution Pen-injector       ? Lumigan 0 01 % Ophthalmic Solution       ? Multivitamins TABS       ? NovoLOG FlexPen 100 UNIT/ML Subcutaneous Solution Pen-injector       ? SLPG Compound Medication       ? Vitamin D3 50275 UNIT Oral Capsule                  Appointment Information         Date:    08/16/2016        Location:    [facility]        Address:           Directions:                      Footnotes revision 14      ?? Denotes a free-text entry  Legal Disclaimer: Any and all recommendations and services provided herein are designed to assist in the preoperative care of the patient  Nothing contained herein is designed to replace, eliminate or alleviate the responsibility of the attending physician to supervise and determine the patient?s preoperative care and course of treatment  Failure to provide complete, accurate information may negatively impact the system?s ability to recommend the proper preoperative protocol  THE ATTENDING PHYSICIAN IS RESPONSIBLE TO REVIEW THE SUGGESTED PREOPERATIVE PROTOCOLS/COURSE OF TREATMENT AND PRESCRIBE THE FINAL COURSE OF PREOPERATIVE TREATMENT IN CONSULTATION WITH THE PATIENT  THE ePREOP SYSTEM AND ITS MATERIALS ARE PROVIDED ? AS IS? WITHOUT WARRANTY OF ANY KIND, EXPRESS OR IMPLIED, INCLUDING, BUT NOT LIMITED TO, WARRANTIES OF PERFORMANCE OR MERCHANTABILITY OR FITNESS FOR A PARTICULAR PURPOSE  PATIENT AND PHYSICIANS HEREBY AGREE THAT THEIR USE OF THE MATERIALS AND RESOURCES ACT AS A CONSENT TO RELEASE AND WAIVE ePREOP FROM ANY AND ALL CLAIMS OF WARRANTY, TORT OR CONTRACT LAW OF ANY KIND  Electronically signed Shelly SARABIA    Jul 20 2016 12:29PM EST

## 2018-01-18 NOTE — RESULT NOTES
Message   Recorded as Task   Date: 06/27/2016 06:55 AM, Created By: Janine Parsons   Task Name: Follow Up   Assigned To: Andrea Chan   Regarding Patient: Corrina Munroe, Status: In Progress   Yonatan Munguia - 27 Jun 2016 6:55 AM     TASK CREATED  Lab review  He has some pyuria on the urinalysis  He has symptoms of urinary tract infection like frequency burning also start him on Cipro 500 twice a day for 5 days otherwise no treatment necessary  he see urology soon in July  Repeat the urinalysis in a week or 2 thank you    Keep this note developed for me to review with the patient  He is an known history of bladder cancer  Yaritza Pandya - 27 Jun 2016 9:09 AM     TASK IN PROGRESS   Yaritza Pandya - 27 Jun 2016 11:50 AM     TASK EDITED  Spoke to patient regarding the results  He does have burning with the urination  He would like the antibiotic phoned into the pharmacy  We will send him the UA slip to the home for repeat      Task printed for appt with Dr Mikaela Vega

## 2018-01-18 NOTE — RESULT NOTES
Verified Results  (1) CULTURE, BLOOD BACT [id number] 02:47PM Orlando Wright     Test Name Result Flag Reference   CLINICAL REPORT (Report)     Test:        Blood culture  Specimen Type:   Blood  Specimen Date:   5/12/2017 2:47 PM  Result Date:    5/17/2017 7:01 PM  Result Status:   Final result  Resulting Lab:   [facility] [address]            Tel: [phone]      CULTURE                                       ------------------                                   No Growth After 5 Days

## 2018-01-22 VITALS
HEIGHT: 76 IN | SYSTOLIC BLOOD PRESSURE: 108 MMHG | WEIGHT: 255.56 LBS | BODY MASS INDEX: 31.12 KG/M2 | DIASTOLIC BLOOD PRESSURE: 68 MMHG | HEART RATE: 68 BPM

## 2018-01-22 VITALS
SYSTOLIC BLOOD PRESSURE: 142 MMHG | DIASTOLIC BLOOD PRESSURE: 84 MMHG | HEIGHT: 76 IN | HEART RATE: 85 BPM | RESPIRATION RATE: 15 BRPM | BODY MASS INDEX: 31.22 KG/M2 | TEMPERATURE: 96.8 F | WEIGHT: 256.38 LBS

## 2018-01-23 NOTE — RESULT NOTES
Verified Results  (1) HEMOGLOBIN A1C [id number] 12:26PM Quu Order Number: [id number]     Test Name Result Flag Reference   HEMOGLOBIN A1C 8 3 % H 4 2-6 3   EST  AVG  GLUCOSE 192 mg/dl       (1) MICROALBUMIN CREATININE RATIO, RANDOM URINE [id number] 12:26PM Quu Order Number: [id number]     Test Name Result Flag Reference   MICROALBUMIN/ CREAT R 70 mg/g creatinine H 0-30   MICROALBUMIN,URINE 37 3 mg/L H 0 0-20 0   CREATININE URINE 53 4 mg/dL       (1) BASIC METABOLIC PROFILE [id number] 12:26PM Quu Order Number: [id number]     Test Name Result Flag Reference   GLUCOSE,RANDM 71 mg/dL     If the patient is fasting, the ADA then defines impaired fasting glucose as > 100 mg/dL and diabetes as > or equal to 123 mg/dL  Specimen collection should occur prior to Sulfasalazine administration due to the potential for falsely depressed results  Specimen collection should occur prior to Sulfapyridine administration due to the potential for falsely elevated results  SODIUM 139 mmol/L  136-145   POTASSIUM 4 2 mmol/L  3 5-5 3   CHLORIDE 105 mmol/L  100-108   CARBON DIOXIDE 31 mmol/L  21-32   ANION GAP (CALC) 3 mmol/L L 4-13   BLOOD UREA NITROGEN 21 mg/dL  5-25   CREATININE 1 43 mg/dL H 0 60-1 30   Standardized to IDMS reference method   CALCIUM 8 7 mg/dL  8 3-10 1   eGFR 55 ml/min/1 73sq [facility] Disease Education Program recommendations are as follows:  GFR calculation is accurate only with a steady state creatinine  Chronic Kidney disease less than 60 ml/min/1 73 sq  meters  Kidney failure less than 15 ml/min/1 73 sq  meters

## 2018-01-24 VITALS
WEIGHT: 253.38 LBS | DIASTOLIC BLOOD PRESSURE: 77 MMHG | HEIGHT: 76 IN | RESPIRATION RATE: 15 BRPM | TEMPERATURE: 96.5 F | BODY MASS INDEX: 30.85 KG/M2 | SYSTOLIC BLOOD PRESSURE: 124 MMHG | HEART RATE: 96 BPM

## 2018-01-24 VITALS
DIASTOLIC BLOOD PRESSURE: 70 MMHG | BODY MASS INDEX: 30.93 KG/M2 | WEIGHT: 254 LBS | SYSTOLIC BLOOD PRESSURE: 100 MMHG | HEART RATE: 68 BPM | HEIGHT: 76 IN

## 2018-02-11 ENCOUNTER — APPOINTMENT (EMERGENCY)
Dept: RADIOLOGY | Facility: HOSPITAL | Age: 73
End: 2018-02-11
Payer: MEDICARE

## 2018-02-11 ENCOUNTER — HOSPITAL ENCOUNTER (EMERGENCY)
Facility: HOSPITAL | Age: 73
Discharge: HOME | End: 2018-02-11
Admitting: EMERGENCY MEDICINE
Payer: MEDICARE

## 2018-02-11 VITALS
HEART RATE: 62 BPM | BODY MASS INDEX: 30.19 KG/M2 | DIASTOLIC BLOOD PRESSURE: 64 MMHG | OXYGEN SATURATION: 99 % | RESPIRATION RATE: 16 BRPM | WEIGHT: 248 LBS | SYSTOLIC BLOOD PRESSURE: 116 MMHG | TEMPERATURE: 97.6 F

## 2018-02-11 DIAGNOSIS — S80.219A KNEE ABRASION: Primary | ICD-10-CM

## 2018-02-11 PROCEDURE — 99283 EMERGENCY DEPT VISIT LOW MDM: CPT

## 2018-02-11 PROCEDURE — 73560 X-RAY EXAM OF KNEE 1 OR 2: CPT

## 2018-02-11 NOTE — DISCHARGE INSTRUCTIONS
Abrasion   WHAT YOU NEED TO KNOW:   What is an abrasion? An abrasion is a scrape on your skin  It happens when your skin rubs against a rough surface  Some examples of an abrasion include rug burn, a skinned elbow, or road rash  Abrasions can be many shapes and sizes  The wound may hurt, bleed, bruise, or swell  How can I care for my abrasion? · Wash your hands and dry them with a clean towel  · Press a clean cloth against your wound to stop any bleeding  · Rinse your wound with a lot of clean water  Do not use harsh soap, alcohol, or iodine solutions  · Use a clean, wet cloth to remove any objects, such as small pieces of rocks or dirt  · Rub antibiotic ointment on your wound  This may help prevent infection and help your wound heal     · Cover the wound with a non-stick bandage  Change the bandage daily, and if gets wet or dirty  When should I seek immediate care? · The bleeding does not stop after 10 minutes of firm pressure  · You cannot rinse one or more foreign objects out of your wound  · You have red streaks on your skin coming from your wound  When should I contact my healthcare provider? · You have a fever or chills  · Your abrasion is red, warm, swollen, or draining pus  · You have questions or concerns about your condition or care  CARE AGREEMENT:   You have the right to help plan your care  Learn about your health condition and how it may be treated  Discuss treatment options with your caregivers to decide what care you want to receive  You always have the right to refuse treatment  The above information is an  only  It is not intended as medical advice for individual conditions or treatments  Talk to your doctor, nurse or pharmacist before following any medical regimen to see if it is safe and effective for you    © 2017 [employer] Information is for End User's use only and may not be sold, redistributed or otherwise used for commercial purposes  All illustrations and images included in CareNotes® are the copyrighted property of A D A M , Inc  or [facility]

## 2018-02-11 NOTE — ED PROVIDER NOTES
History  Chief Complaint   Patient presents with    Wound Check     Patient states he dropped a cabinet on his L knee on Friday and is concerned because he is a diabetic  Abrasion noted to L knee, no surrounding redness/drainage  Patient is a 61-year-old who presents for evaluation of left knee pain  Patient states that he had a cabinet fall on his left knee and sustained a abrasion  Is concerned for infection as he is an uncontrolled diabetic  Denies any weakness or numbness  Denies any fevers or chills  Denies any drainage  History provided by:  Patient      Prior to Admission Medications   Prescriptions Last Dose Informant Patient Reported? Taking? ISOSORBIDE MONONITRATE PO   Yes Yes   Sig: Take 30 mg by mouth daily  Insulin Glargine (LANTUS SOLOSTAR) 100 UNIT/ML SOPN   Yes Yes   Sig: Inject 50 Units under the skin Medrol Dose Pack scheduling ONLY     aspirin 81 MG tablet   Yes Yes   Sig: Take 81 mg by mouth daily  azelastine (ASTELIN) 0 1 % nasal spray   Yes Yes   Si spray into each nostril 2 (two) times a day  Use in each nostril as directed   bimatoprost (LUMIGAN) 0 01 % ophthalmic drops   Yes Yes   Si drop daily at bedtime  bismuth subsalicylate (PEPTO BISMOL) 262 MG chewable tablet   Yes Yes   Sig: Chew 524 mg daily as needed for indigestion  ergocalciferol (VITAMIN D2) 50,000 units   Yes Yes   Sig: Take 50,000 Units by mouth Indications: every 2 weeks  fluocinonide (LIDEX) 0 05 % cream   Yes Yes   Sig: Apply topically 2 (two) times a day  fluticasone (VERAMYST) 27 5 MCG/SPRAY nasal spray   Yes Yes   Si sprays into each nostril daily  furosemide (LASIX) 20 mg tablet   Yes Yes   Sig: Take 20 mg by mouth 2 (two) times a day    gabapentin (NEURONTIN) 300 mg capsule   Yes Yes   Sig: Take 600 mg by mouth Medrol Dose Pack scheduling ONLY     insulin aspart (NovoLOG) 100 units/mL injection   Yes Yes   Sig: Inject 15 Units under the skin 3 (three) times a day before meals    metFORMIN (GLUCOPHAGE) 1000 MG tablet   Yes Yes   Sig: Take 1,000 mg by mouth 2 (two) times a day with meals  metoprolol succinate (TOPROL-XL) 100 mg 24 hr tablet   Yes Yes   Sig: Take 100 mg by mouth daily  multivitamin (THERAGRAN) TABS   Yes Yes   Sig: Take 1 tablet by mouth daily  omeprazole (PriLOSEC) 20 mg delayed release capsule   Yes Yes   Sig: Take 20 mg by mouth daily  Facility-Administered Medications: None       Past Medical History:   Diagnosis Date    [facility] )     Cardiac disease     Diabetes mellitus ([place] [address] )     Hypertension        Past Surgical History:   Procedure Laterality Date    CARDIAC SURGERY      CHOLECYSTECTOMY      GASTRIC BYPASS      INCISION AND DRAINAGE OF WOUND Right 2/26/2017    Procedure: INCISION AND DRAINAGE (I&D) EXTREMITY WITH APPLICATION OF GRAFT JACKET;  Surgeon: Alva Burleson DPM;  [facility];  Service:     INCISION AND DRAINAGE OF WOUND Right 4/25/2017    Procedure: INCISION AND DRAINAGE (I&D) EXTREMITY, APPLICATION OF GRAFT;  Surgeon: Alva Burleson DPM;  [facility];  Service:     JOINT REPLACEMENT      knee    RI CYSTOURETHROSCOPY,BIOPSY N/A 8/16/2016    Procedure: Rufinoin Heidy;  Surgeon: Faustino Stoddard MD;  [facility];  Service: Urology    ROTATOR CUFF REPAIR      VAC DRESSING APPLICATION Right 6/91/7366    Procedure: APPLICATION VAC DRESSING;  Surgeon: Alva Burleson DPM;  [facility];  Service:        History reviewed  No pertinent family history  I have reviewed and agree with the history as documented  Social History   Substance Use Topics    Smoking status: Former Smoker    Smokeless tobacco: Not on file    Alcohol use No        Review of Systems   Constitutional: Negative for activity change, appetite change and fatigue  HENT: Negative for nosebleeds, sneezing, sore throat, trouble swallowing and voice change      Eyes: Negative for photophobia, pain and visual disturbance  Respiratory: Negative for apnea, choking and stridor  Cardiovascular: Negative for palpitations and leg swelling  Gastrointestinal: Negative for anal bleeding and constipation  Endocrine: Negative for cold intolerance, heat intolerance, polydipsia and polyphagia  Genitourinary: Negative for decreased urine volume, enuresis, frequency, genital sores and urgency  Musculoskeletal: Negative for joint swelling and myalgias  Allergic/Immunologic: Negative for environmental allergies and food allergies  Neurological: Negative for tremors, seizures, speech difficulty and weakness  Hematological: Negative for adenopathy  Psychiatric/Behavioral: Negative for behavioral problems, decreased concentration, dysphoric mood and hallucinations  Physical Exam  ED Triage Vitals [02/11/18 1514]   Temperature Pulse Respirations Blood Pressure SpO2   97 6 °F (36 4 °C) 62 16 116/64 99 %      Temp Source Heart Rate Source Patient Position - Orthostatic VS BP Location FiO2 (%)   Oral Monitor Sitting Right arm --      Pain Score       5           Orthostatic Vital Signs  Vitals:    02/11/18 1514   BP: 116/64   Pulse: 62   Patient Position - Orthostatic VS: Sitting       Physical Exam   Constitutional: He is oriented to person, place, and time  He appears well-developed and well-nourished  No distress  HENT:   Head: Normocephalic and atraumatic  Right Ear: External ear normal    Left Ear: External ear normal    Nose: Nose normal    Mouth/Throat: Oropharynx is clear and moist    Eyes: Conjunctivae and EOM are normal  Pupils are equal, round, and reactive to light  Neck: Normal range of motion  Neck supple  Cardiovascular: Normal rate, regular rhythm and normal heart sounds  Exam reveals no gallop and no friction rub  No murmur heard  Pulmonary/Chest: Effort normal and breath sounds normal  No respiratory distress  He has no wheezes  Abdominal: Soft   Bowel sounds are normal  Musculoskeletal: He exhibits tenderness  Legs:  Neurological: He is alert and oriented to person, place, and time  Skin: Skin is warm and dry  He is not diaphoretic  Psychiatric: He has a normal mood and affect  His behavior is normal    Vitals reviewed  ED Medications  Medications - No data to display    Diagnostic Studies  Results Reviewed     None                 XR knee 1 or 2 views left   ED Interpretation by Meghann Ann PA-C (02/11 9689)   No acute abnormalities                 Procedures  Procedures       Phone Contacts  ED Phone Contact    ED Course  ED Course                                MDM  CritCare Time    Disposition  Final diagnoses:   Knee abrasion     Time reflects when diagnosis was documented in both MDM as applicable and the Disposition within this note     Time User Action Codes Description Comment    2/11/2018  4:13 PM Ron Miller 89 Knee abrasion       ED Disposition     ED Disposition Condition Comment    Discharge  Melburn Pill discharge to home/self care  Condition at discharge: Stable        Follow-up Information     Follow up With Specialties Details Why Heron Hernández MD Internal Medicine Schedule an appointment as soon as possible for a visit  [address] [place] [address]  [phone]          Patient's Medications   Discharge Prescriptions    No medications on file     No discharge procedures on file      ED Provider  Electronically Signed by           Meghann Ann PA-C  02/11/18 4941

## 2018-02-15 ENCOUNTER — OFFICE VISIT (OUTPATIENT)
Dept: INTERNAL MEDICINE CLINIC | Facility: CLINIC | Age: 73
End: 2018-02-15
Payer: MEDICARE

## 2018-02-15 VITALS
HEART RATE: 64 BPM | RESPIRATION RATE: 15 BRPM | SYSTOLIC BLOOD PRESSURE: 114 MMHG | BODY MASS INDEX: 31.05 KG/M2 | WEIGHT: 255 LBS | TEMPERATURE: 96.2 F | HEIGHT: 76 IN | DIASTOLIC BLOOD PRESSURE: 78 MMHG

## 2018-02-15 DIAGNOSIS — S80.212A KNEE ABRASION, LEFT, INITIAL ENCOUNTER: ICD-10-CM

## 2018-02-15 DIAGNOSIS — M25.462 EFFUSION OF LEFT KNEE JOINT: ICD-10-CM

## 2018-02-15 DIAGNOSIS — N18.30 CKD (CHRONIC KIDNEY DISEASE) STAGE 3, GFR 30-59 ML/MIN (HCC): ICD-10-CM

## 2018-02-15 DIAGNOSIS — Z79.4 TYPE 2 DIABETES MELLITUS WITH OTHER SKIN ULCER, WITH LONG-TERM CURRENT USE OF INSULIN (HCC): Primary | ICD-10-CM

## 2018-02-15 DIAGNOSIS — I10 ESSENTIAL HYPERTENSION: ICD-10-CM

## 2018-02-15 DIAGNOSIS — E11.622 TYPE 2 DIABETES MELLITUS WITH OTHER SKIN ULCER, WITH LONG-TERM CURRENT USE OF INSULIN (HCC): Primary | ICD-10-CM

## 2018-02-15 PROBLEM — M25.062 KNEE HEMARTHROSIS, LEFT: Status: ACTIVE | Noted: 2018-02-15

## 2018-02-15 PROCEDURE — 99214 OFFICE O/P EST MOD 30 MIN: CPT | Performed by: INTERNAL MEDICINE

## 2018-02-15 NOTE — PATIENT INSTRUCTIONS
Osteoarthritis   WHAT YOU NEED TO KNOW:   Osteoarthritis (OA) occurs when cartilage (tissue that cushions a joint) wears away slowly and causes the bones to rub together  OA is a long-term condition that often affects the hands, neck, lower back, knees, and hips  OA is also called arthrosis or degenerative joint disease  DISCHARGE INSTRUCTIONS:   Return to the emergency department if:   · You have severe pain  · You cannot move your joint  Contact your healthcare provider if:   · You have a fever  · Your joint is red and tender  · You have questions or concerns about your condition or care  Medicines:   · Acetaminophen  is used to decrease pain  It is available without a doctor's order  Ask how much to take and how often to take it  Follow directions  Acetaminophen can cause liver damage if not taken correctly  · NSAIDs , such as ibuprofen, help decrease swelling, pain, and fever  This medicine is available with or without a doctor's order  NSAIDs can cause stomach bleeding or kidney problems in certain people  If you take blood thinner medicine, always ask your healthcare provider if NSAIDs are safe for you  Always read the medicine label and follow directions  · Prescription pain medicine  may be given to decrease severe pain if other medicines do not work  Take the medicine as directed  Do not wait until the pain is severe before you take your medicine  · Take your medicine as directed  Contact your healthcare provider if you think your medicine is not helping or if you have side effects  Tell him or her if you are allergic to any medicine  Keep a list of the medicines, vitamins, and herbs you take  Include the amounts, and when and why you take them  Bring the list or the pill bottles to follow-up visits  Carry your medicine list with you in case of an emergency  Follow up with your healthcare provider as directed:  Write down your questions so you remember to ask them during your visits  Go to physical therapy as directed:  A physical therapist teaches you exercises to help improve movement and strength, and to decrease pain in your joints  The exercises also help lower your risk for loss of function  Manage your OA:   · Stay active  Physical activity may reduce your pain and improve your ability to do daily activities  Avoid activities that cause pain  Ask your healthcare provider what type of exercise would be best for you  · Maintain a healthy weight  This helps decrease the strain on the joints in your back, hips, knees, ankles, and feet  Ask your healthcare provider how much you should weigh  Ask him to help you create a weight loss plan if you are overweight  · Use heat or ice  on your joints as directed  Heat and ice help decrease pain, swelling, and muscle spasms  Use a heating pad on a low setting or take a warm bath  Use an ice pack, or put crushed ice in a plastic bag  Cover it with a towel  · Massage  the muscles around the joint to relieve pain and stiffness  · Use a cane, crutches, or a walker  to protect and relieve pressure on your ankle, knee, and hip joints  You may also be prescribed shoe inserts to decrease pressure in your joints  · Wear flat or low-heeled shoes  This will help decrease pain and reduce pressure on your ankle, knee, and hip joints  © 2017 [employer]  Information is for End User's use only and may not be sold, redistributed or otherwise used for commercial purposes  All illustrations and images included in CareNotes® are the copyrighted property of A D [employer] M [employer]  or Salvador Cooper  The above information is an  only  It is not intended as medical advice for individual conditions or treatments  Talk to your doctor, nurse or pharmacist before following any medical regimen to see if it is safe and effective for you

## 2018-02-15 NOTE — ASSESSMENT & PLAN NOTE
Will refer to Orthopedics for  arthrocentesis  He had trauma to his knee over the weekend    He went to immediate medical care they did an x-ray he has a knee effusion  documented on my exam today

## 2018-02-15 NOTE — PROGRESS NOTES
Assessment/Plan:    No problem-specific Assessment & Plan notes found for this encounter  Problem List Items Addressed This Visit     Hypertension    Diabetes mellitus ([facility] [address] ) - Primary    CKD (chronic kidney disease) stage 3, GFR 30-59 ml/min            Subjective:      Patient ID: Nehemiah Abdi is a 74 y o  male  Patient came in for an evaluation he was in immediate medical care on Sunday cabinet fell on his left leg around the knee area went for an evaluation I reviewed the records they did an x-ray of the knee which showed a knee effusion  He has abrasion in the medial aspect of the left knee upper  I think he should try to go to work to [employer] to get did not knee drain from fluid he might have in hemarthrosis  And will give him some antibiotic    Blood pressure is okay  Is 114/74 he has no fever here  Diabetes is fairly well control his sugar was 70 this morning        The following portions of the patient's history were reviewed and updated as appropriate: allergies, current medications, past family history, past medical history, past social history, past surgical history and problem list     Review of Systems   Constitutional: Negative  Negative for activity change, appetite change, fatigue, fever and unexpected weight change  HENT: Negative for congestion, ear pain, hearing loss, mouth sores, postnasal drip, rhinorrhea, sore throat, trouble swallowing and voice change  Eyes: Negative for pain, redness and visual disturbance  Respiratory: Negative for cough, chest tightness, shortness of breath and wheezing  Cardiovascular: Negative for chest pain, palpitations and leg swelling  Gastrointestinal: Negative for abdominal distention, abdominal pain, blood in stool, constipation, diarrhea and nausea  Endocrine: Negative for cold intolerance, heat intolerance, polydipsia, polyphagia and polyuria     Genitourinary: Negative for difficulty urinating, dysuria, flank pain, frequency, hematuria and urgency  Musculoskeletal: Negative for arthralgias, back pain, gait problem, joint swelling and myalgias  Skin: Negative for color change and pallor  Neurological: Negative for dizziness, tremors, seizures, syncope, weakness, numbness and headaches  Hematological: Negative for adenopathy  Does not bruise/bleed easily  Psychiatric/Behavioral: Negative  Negative for sleep disturbance  The patient is not nervous/anxious  Objective:    Vitals:    02/15/18 1406   BP: 114/78   Pulse: 64   Resp: 15   Temp: (!) 96 2 °F (35 7 °C)        Physical Exam   Constitutional: He is oriented to person, place, and time  He appears well-developed and well-nourished  HENT:   Head: Normocephalic  Right Ear: External ear normal    Left Ear: External ear normal    Nose: Nose normal    Mouth/Throat: Oropharynx is clear and moist  No oropharyngeal exudate  Eyes: Conjunctivae and EOM are normal  Pupils are equal, round, and reactive to light  Neck: Normal range of motion  Neck supple  No thyromegaly present  Cardiovascular: Normal rate, regular rhythm, normal heart sounds and intact distal pulses  Exam reveals no gallop and no friction rub  No murmur heard  Regular rhythm  Pulmonary/Chest: Effort normal and breath sounds normal  No respiratory distress  He has no wheezes  He has no rales  Abdominal: Soft  Bowel sounds are normal  He exhibits no distension and no mass  There is no tenderness  There is no rebound and no guarding  Musculoskeletal: Normal range of motion  He exhibits tenderness  He exhibits no deformity  Left knee: He exhibits swelling and effusion  He exhibits no erythema  Legs:  As an effusion of the left knee and he has an abrasion in the upper part  Above the upper patella   Lymphadenopathy:     He has no cervical adenopathy  Neurological: He is alert and oriented to person, place, and time  Skin: Skin is warm and dry  Abrasion noted  There is erythema  Psychiatric: He has a normal mood and affect  His behavior is normal  Judgment normal    Nursing note and vitals reviewed

## 2018-02-16 ENCOUNTER — OFFICE VISIT (OUTPATIENT)
Dept: OBGYN CLINIC | Facility: MEDICAL CENTER | Age: 73
End: 2018-02-16
Payer: MEDICARE

## 2018-02-16 ENCOUNTER — TELEPHONE (OUTPATIENT)
Dept: OBGYN CLINIC | Facility: MEDICAL CENTER | Age: 73
End: 2018-02-16

## 2018-02-16 ENCOUNTER — APPOINTMENT (OUTPATIENT)
Dept: RADIOLOGY | Facility: CLINIC | Age: 73
End: 2018-02-16
Payer: MEDICARE

## 2018-02-16 VITALS
WEIGHT: 249 LBS | BODY MASS INDEX: 30.32 KG/M2 | DIASTOLIC BLOOD PRESSURE: 78 MMHG | HEART RATE: 80 BPM | SYSTOLIC BLOOD PRESSURE: 125 MMHG | HEIGHT: 76 IN

## 2018-02-16 DIAGNOSIS — M25.462 EFFUSION OF LEFT KNEE JOINT: ICD-10-CM

## 2018-02-16 DIAGNOSIS — M25.562 LEFT KNEE PAIN, UNSPECIFIED CHRONICITY: Primary | ICD-10-CM

## 2018-02-16 PROCEDURE — 73564 X-RAY EXAM KNEE 4 OR MORE: CPT

## 2018-02-16 PROCEDURE — 99204 OFFICE O/P NEW MOD 45 MIN: CPT | Performed by: FAMILY MEDICINE

## 2018-02-16 NOTE — PATIENT INSTRUCTIONS
Patient to wear hinged knee brace for left knee for support  Patient remain nonweightbearing until after follow-up of MRI review results for his left knee

## 2018-02-16 NOTE — TELEPHONE ENCOUNTER
Please call patient and notify that I noticed he placed blood in his urine on his review of systems paper work  Please tell him I recommended he follow-up with his primary care doctor immediately for this issue as it can be a sign of significant medical problem

## 2018-02-16 NOTE — PROGRESS NOTES
1  Left knee pain, unspecified chronicity  XR knee 4+ vw left injury    MRI knee left  wo contrast   2  Effusion of left knee joint  Ambulatory referral to Orthopedic Surgery    MRI knee left  wo contrast     Orders Placed This Encounter   Procedures    XR knee 4+ vw left injury    MRI knee left  wo contrast        Imaging Studies (I personally reviewed results in PACS):  Left knee x-ray:  No acute osseous abnormality  Positive suprapatellar effusion  IMPRESSION:   left knee injury with effusion status post direct impact from 300 lb locker in setting of tibial plateau tenderness as well as positive Christian test for crepitus laterally and pain     Differential diagnosis:  Occult tibial plateau fracture  Meniscal tear      Return for  follow-up after MRI  Patient Instructions    Patient to wear hinged knee brace for left knee for support  Patient remain nonweightbearing until after follow-up of MRI review results for his left knee          CHIEF COMPLAINT: A left knee pain    HPI:  Mau Rodgers is a 74 y o  male  who presents for  Evaluation left knee pain status post injury occurring approximately 1 week ago on last Friday  Patient states that he was helping to carry a 300 lb locker out of a truck down a ramp slipped and fell  [place] then directly impacted his knee  This resulted in laceration to his distal thigh superior to the patella on the medial aspect  He points to anterior knee as well calf is source of pain  He describes his pain as sore pain that becomes severe up to a 7 at 10 at times  It is worse when walking   And bending  Patient to go the ER had x-rays which were normal   Patient did see his primary care physician as well for follow-up was given wound dressing and referral to orthopedic office for evaluation  Today, patient states he has minimal improvement since injury    He states he has seen his primary care physician for his superficial thigh laceration and was given antibiotic ointment to apply  He states that his laceration has significant improved in redness as well as soreness  Review of Systems   Constitutional: Negative for chills, fever and unexpected weight change  HENT: Negative for hearing loss, nosebleeds and sore throat  Eyes: Negative for pain, redness and visual disturbance  Respiratory: Negative for cough, shortness of breath and wheezing  Cardiovascular: Negative for chest pain, palpitations and leg swelling  Gastrointestinal: Negative for abdominal distention, nausea and vomiting  Endocrine: Negative for polydipsia and polyuria  Genitourinary: Negative for dysuria and hematuria  Positive blood in urine   Skin: Positive for wound  Negative for rash  Neurological: Negative for dizziness, numbness and headaches  Psychiatric/Behavioral: Negative for decreased concentration and suicidal ideas           Following history reviewed and update:    Past Medical History:   Diagnosis Date    Bladder cancer ([facility] )     Cardiac disease     Diabetes mellitus ([place] [address] )     Hypertension      Past Surgical History:   Procedure Laterality Date    CARDIAC SURGERY      CHOLECYSTECTOMY      GASTRIC BYPASS      INCISION AND DRAINAGE OF WOUND Right 2/26/2017    Procedure: INCISION AND DRAINAGE (I&D) EXTREMITY WITH APPLICATION OF GRAFT JACKET;  Surgeon: Alva Burleson DPM;  [facility];  Service:     INCISION AND DRAINAGE OF WOUND Right 4/25/2017    Procedure: INCISION AND DRAINAGE (I&D) EXTREMITY, APPLICATION OF GRAFT;  Surgeon: Alva Burleson DPM;  [facility];  Service:     JOINT REPLACEMENT      knee    [place] CYSTOURETHROSCOPY,BIOPSY N/A 8/16/2016    Procedure: Ariana Moody;  Surgeon: Faustino Stoddard MD;  [facility];  Service: Urology    ROTATOR CUFF REPAIR      VAC DRESSING APPLICATION Right 8/67/4909    Procedure: APPLICATION VAC DRESSING;  Surgeon: Alva Burleson DPM;  [facility]; Service:      Social History   History   Alcohol Use No     History   Drug Use No     History   Smoking Status    Former Smoker   Smokeless Tobacco    Never Used     History reviewed  No pertinent family history  Allergies   Allergen Reactions    Atorvastatin Hives, Other (See Comments) and Itching     Pt  Reports "Hives"  Lipitor and Simvastatin    Simvastatin Rash     Other reaction(s): Edema, Other Reaction  "ALL STATINS"    Insulin Lispro Edema    Statins Itching    Other Other (See Comments), Itching and Rash     rash to electrodes  rash to electrodes and adhesives          Physical Exam   Constitutional: He is oriented to person, place, and time  He appears well-developed and well-nourished  HENT:   Head: Normocephalic and atraumatic  Right Ear: External ear normal    Left Ear: External ear normal    Nose: Nose normal    Eyes: Conjunctivae are normal  Right eye exhibits no discharge  Left eye exhibits no discharge  No scleral icterus  Neck: Neck supple  Pulmonary/Chest: Effort normal  No respiratory distress  Neurological: He is alert and oriented to person, place, and time  Psychiatric: He has a normal mood and affect  His behavior is normal  Judgment and thought content normal        Ortho Exam    Left Knee  Positive for vertical slit laceration superficial distal anterior thigh  Wound clean dry intact  No localized swelling of wound  No drainage  Erythema: no  Swelling:  3+ effusion knee  Increased Warmth: no  ROM: full extension  Flexion to 100 active  Tenderness:  Positive significant tenderness medial joint line and medial tibial plateau  Patellar Apprehension: No  Patellar Grind Beverly's:  Positive  Lachman's: negative  Drawer: negative  Varus laxity: negative  Valgus laxity: negative  [facility]:  Positive lateral crepitus  Positive bilateral pain      No calf tenderness  No calf swelling  No calf erythema  Dorsalis pedis pulse intact   No foot swelling    Procedures

## 2018-02-20 ENCOUNTER — HOSPITAL ENCOUNTER (OUTPATIENT)
Dept: MRI IMAGING | Facility: HOSPITAL | Age: 73
Discharge: HOME | End: 2018-02-20
Attending: FAMILY MEDICINE
Payer: MEDICARE

## 2018-02-20 DIAGNOSIS — M25.462 EFFUSION OF LEFT KNEE JOINT: ICD-10-CM

## 2018-02-20 DIAGNOSIS — M25.562 LEFT KNEE PAIN, UNSPECIFIED CHRONICITY: ICD-10-CM

## 2018-02-20 PROCEDURE — 73721 MRI JNT OF LWR EXTRE W/O DYE: CPT

## 2018-02-22 ENCOUNTER — OFFICE VISIT (OUTPATIENT)
Dept: OBGYN CLINIC | Facility: MEDICAL CENTER | Age: 73
End: 2018-02-22
Payer: MEDICARE

## 2018-02-22 VITALS
HEIGHT: 76 IN | DIASTOLIC BLOOD PRESSURE: 73 MMHG | BODY MASS INDEX: 30.44 KG/M2 | SYSTOLIC BLOOD PRESSURE: 110 MMHG | HEART RATE: 83 BPM | WEIGHT: 250 LBS

## 2018-02-22 DIAGNOSIS — S83.282A ACUTE LATERAL MENISCUS TEAR OF LEFT KNEE, INITIAL ENCOUNTER: Primary | ICD-10-CM

## 2018-02-22 DIAGNOSIS — T14.8XXA CONTUSION OF BONE: ICD-10-CM

## 2018-02-22 DIAGNOSIS — M25.562 ACUTE PAIN OF LEFT KNEE: Primary | ICD-10-CM

## 2018-02-22 DIAGNOSIS — S83.242A OTHER TEAR OF MEDIAL MENISCUS, CURRENT INJURY, LEFT KNEE, INITIAL ENCOUNTER: ICD-10-CM

## 2018-02-22 PROCEDURE — 99213 OFFICE O/P EST LOW 20 MIN: CPT | Performed by: FAMILY MEDICINE

## 2018-02-22 NOTE — PROGRESS NOTES
1  Acute lateral meniscus tear of left knee, initial encounter  Ambulatory referral to Orthopedic Surgery   2  Other tear of medial meniscus, current injury, left knee, initial encounter  Ambulatory referral to Orthopedic Surgery   3  Contusion of bone       Orders Placed This Encounter   Procedures    Ambulatory referral to Orthopedic Surgery        Imaging Studies (I personally reviewed results in PACS):  MRI left knee 02/20/2018:  Tibial plateau lateral bone contusion, complex tear posterior horn and body of lateral meniscus, complex tear posterior horn of medial meniscus, edema of ACL concerning for sprain versus degenerative changes, tricompartmental arthritis, osteochondral injury medial patellar facet that appears chronic    IMPRESSION:   left knee injury 02/09/2018  Left knee lateral tibial plateau bone contusion   Complex tear lateral and medial meniscus with positive lateral crepitus on Christian  ACL sprain without laxity on exam  diabetes mildly uncontrolled A1c 8 3  History of coronary artery disease with stents    Return for Follow up with Dr Paul Campos  Patient Instructions   Patient follow with surgeon to consider knee arthroscopy  Patient to wear hinged knee brace  He may start partial weight-bearing with cane  He has upcoming cruise vacation  Educated patient to risk of worsening meniscal tear with walking running jumping twisting  Patient declined an aspiration today  CHIEF COMPLAINT: A left knee pain    HPI:  Oneil Habermann is a 74 y o  male  who presents for  Evaluation left knee pain status post injury occurring approximately 2 weeks ago  Patient to go the ER had x-rays which were normal  Last visit patient had MRI ordered  He was given hinged knee brace need to remain nonweightbearing  Today,  Patient states that overall it feels approximately 50% improved  He has been compliant with hinged knee brace  He has been noncompliant with nonweightbearing    He does present with his crutches today however  Review of Systems   Constitutional: Negative for fever  Neurological: Negative for weakness  Patient denies any left calf pain or swelling       Following history reviewed and update:    Past Medical History:   Diagnosis Date    Bladder cancer ([facility] [address] )     Cardiac disease     Diabetes mellitus ([facility] )     Hypertension      Past Surgical History:   Procedure Laterality Date    CARDIAC SURGERY      CHOLECYSTECTOMY      GASTRIC BYPASS      INCISION AND DRAINAGE OF WOUND Right 2/26/2017    Procedure: INCISION AND DRAINAGE (I&D) EXTREMITY WITH APPLICATION OF GRAFT JACKET;  Surgeon: George Claros DPM;  [facility];  Service:     INCISION AND DRAINAGE OF WOUND Right 4/25/2017    Procedure: INCISION AND DRAINAGE (I&D) EXTREMITY, APPLICATION OF GRAFT;  Surgeon: George Claros DPM;  [facility];  Service:     JOINT REPLACEMENT      knee    [place] CYSTOURETHROSCOPY,BIOPSY N/A 8/16/2016    Procedure: Lauraita Tubac;  Surgeon: Kimber Darling MD;  [facility];  Service: Urology    ROTATOR CUFF REPAIR      VAC DRESSING APPLICATION Right 2/29/0706    Procedure: APPLICATION VAC DRESSING;  Surgeon: George Claros DPM;  [facility];  Service:      Social History   History   Alcohol Use No     History   Drug Use No     History   Smoking Status    Former Smoker   Smokeless Tobacco    Never Used     No family history on file  Allergies   Allergen Reactions    Atorvastatin Hives, Other (See Comments) and Itching     Pt  Reports "Hives"  Lipitor and Simvastatin    Simvastatin Rash     Other reaction(s): Edema, Other Reaction  "ALL STATINS"    Insulin Lispro Edema    Statins Itching    Other Other (See Comments), Itching and Rash     rash to electrodes  rash to electrodes and adhesives          Physical Exam   Constitutional: He is oriented to person, place, and time  He appears well-developed and well-nourished     HENT:   Head: Normocephalic and atraumatic  Right Ear: External ear normal    Left Ear: External ear normal    Nose: Nose normal    Eyes: Conjunctivae are normal  Right eye exhibits no discharge  Left eye exhibits no discharge  No scleral icterus  Neck: Neck supple  Pulmonary/Chest: Effort normal  No respiratory distress  Neurological: He is alert and oriented to person, place, and time  Psychiatric: He has a normal mood and affect  His behavior is normal  Judgment and thought content normal        Ortho Exam    Left Knee  Positive for vertical slit laceration superficial distal anterior thigh healing well  Clean dry intact  No localized swelling of wound  No drainage  Erythema: no  Swelling:  3+ effusion knee  Increased Warmth: no  ROM:  Extension -10  Flexion 100  Tenderness:  Positive significant tenderness lateral joint line  Lachman's: negative  Drawer: negative  Varus laxity: negative  Valgus laxity: negative  Aidan:  Positive lateral crepitus  Positive bilateral pain  No calf tenderness  No calf swelling  No calf erythema    Negative Homans sign    Procedures

## 2018-02-22 NOTE — PATIENT INSTRUCTIONS
Patient follow with surgeon to consider knee arthroscopy  Patient to wear hinged knee brace  He may start partial weight-bearing with cane  He has upcoming cruise vacation  Educated patient to risk of worsening meniscal tear with walking running jumping twisting  Patient declined an aspiration today

## 2018-03-01 DIAGNOSIS — E11.65 TYPE 2 DIABETES MELLITUS WITH HYPERGLYCEMIA (HCC): ICD-10-CM

## 2018-03-13 DIAGNOSIS — E55.9 VITAMIN D DEFICIENCY: Primary | ICD-10-CM

## 2018-03-13 RX ORDER — CHOLECALCIFEROL (VITAMIN D3) 1250 MCG
CAPSULE ORAL
Qty: 12 CAPSULE | Refills: 0 | Status: SHIPPED | OUTPATIENT
Start: 2018-03-13 | End: 2019-02-07 | Stop reason: ALTCHOICE

## 2018-03-15 ENCOUNTER — OFFICE VISIT (OUTPATIENT)
Dept: INTERNAL MEDICINE CLINIC | Facility: CLINIC | Age: 73
End: 2018-03-15
Payer: MEDICARE

## 2018-03-15 VITALS
TEMPERATURE: 97.8 F | HEIGHT: 76 IN | HEART RATE: 76 BPM | DIASTOLIC BLOOD PRESSURE: 82 MMHG | BODY MASS INDEX: 31.05 KG/M2 | WEIGHT: 255 LBS | RESPIRATION RATE: 15 BRPM | SYSTOLIC BLOOD PRESSURE: 125 MMHG

## 2018-03-15 DIAGNOSIS — M25.062 KNEE HEMARTHROSIS, LEFT: ICD-10-CM

## 2018-03-15 DIAGNOSIS — Z79.4 TYPE 2 DIABETES MELLITUS WITH OTHER SKIN ULCER, WITH LONG-TERM CURRENT USE OF INSULIN (HCC): Primary | ICD-10-CM

## 2018-03-15 DIAGNOSIS — N18.30 CKD (CHRONIC KIDNEY DISEASE) STAGE 3, GFR 30-59 ML/MIN (HCC): ICD-10-CM

## 2018-03-15 DIAGNOSIS — E11.622 TYPE 2 DIABETES MELLITUS WITH OTHER SKIN ULCER, WITH LONG-TERM CURRENT USE OF INSULIN (HCC): Primary | ICD-10-CM

## 2018-03-15 DIAGNOSIS — I10 ESSENTIAL HYPERTENSION: ICD-10-CM

## 2018-03-15 DIAGNOSIS — I25.10 CORONARY ARTERY DISEASE INVOLVING NATIVE HEART WITHOUT ANGINA PECTORIS, UNSPECIFIED VESSEL OR LESION TYPE: ICD-10-CM

## 2018-03-15 DIAGNOSIS — J44.9 CHRONIC OBSTRUCTIVE PULMONARY DISEASE, UNSPECIFIED COPD TYPE (HCC): ICD-10-CM

## 2018-03-15 PROCEDURE — 99214 OFFICE O/P EST MOD 30 MIN: CPT | Performed by: INTERNAL MEDICINE

## 2018-03-15 RX ORDER — AZELASTINE HCL 205.5 UG/1
1 SPRAY NASAL 2 TIMES DAILY
Refills: 0 | COMMUNITY
Start: 2018-01-08 | End: 2020-05-14 | Stop reason: SDUPTHER

## 2018-03-15 NOTE — PATIENT INSTRUCTIONS
Your blood pressure is control  Going to see the endocrinologist tomorrow  Year overdue for your lipid profile get the lab work done will add a hemoglobin A1c to see your diabetic control  Continue follow-up orthopedics Dr Michael Hanson min will see you concerning trauma you to your left knee

## 2018-03-15 NOTE — ASSESSMENT & PLAN NOTE
Will see endocrinologist tomorrow hemoglobin A1c last year was around 8 2 try to get below 8 will be better avoid hypoglycemia goal to get close to 7 5

## 2018-03-15 NOTE — PROGRESS NOTES
Assessment/Plan:    [facility] ([facility]    Will see endocrinologist tomorrow hemoglobin A1c last year was around 8 2 try to get below 8 will be better avoid hypoglycemia goal to get close to 7 5    Chronic obstructive pulmonary disease (HCC)   Stable no coughing or shortness of breath    CAD (coronary artery disease)   No angina blood pressure control over due for lipid profile order    Hypertension   Blood pressure control no change in medication  Knee hemarthrosis, left    Follow up with Orthopedic  Abnormal and an MRI of the knee  Acute lateral meniscus tear of the left knee         Problem List Items Addressed This Visit     CAD (coronary artery disease)      No angina blood pressure control over due for lipid profile order         Relevant Orders    CBC and differential    Comprehensive metabolic panel    Lipid Panel with Direct LDL reflex    TSH, 3rd generation with T4 reflex    Vitamin D 25 hydroxy    Microalbumin / creatinine urine ratio    Magnesium    Gamma GT    HEMOGLOBIN A1C W/ EAG ESTIMATION    Chronic obstructive pulmonary disease (HCC)      Stable no coughing or shortness of breath         Hypertension      Blood pressure control no change in medication           Diabetes mellitus ([facility] ) - Primary       Will see endocrinologist tomorrow hemoglobin A1c last year was around 8 2 try to get below 8 will be better avoid hypoglycemia goal to get close to 7 5         Relevant Orders    CBC and differential    Comprehensive metabolic panel    Lipid Panel with Direct LDL reflex    TSH, 3rd generation with T4 reflex    Vitamin D 25 hydroxy    Microalbumin / creatinine urine ratio    Magnesium    Gamma GT    HEMOGLOBIN A1C W/ EAG ESTIMATION    CKD (chronic kidney disease) stage 3, GFR 30-59 ml/min    Relevant Orders    CBC and differential    Comprehensive metabolic panel    Lipid Panel with Direct LDL reflex    TSH, 3rd generation with T4 reflex    Vitamin D 25 hydroxy    Microalbumin / creatinine urine ratio    Magnesium    Gamma GT    HEMOGLOBIN A1C W/ EAG ESTIMATION    Knee hemarthrosis, left       Follow up with Orthopedic  Abnormal and an MRI of the knee  Acute lateral meniscus tear of the left knee                 Subjective:      Patient ID: Yesenia Hernandez is a 74 y o  male  Chief Complaint   Patient presents with    Follow-up     Falls in the past year, no urinary incontinence, PHQ-2 is negative  Current Outpatient Prescriptions:     aspirin 81 MG tablet, Take 81 mg by mouth daily  , Disp: , Rfl:     azelastine (ASTELIN) 0 1 % nasal spray, 1 spray into each nostril 2 (two) times a day  Use in each nostril as directed, Disp: , Rfl:     bimatoprost (LUMIGAN) 0 01 % ophthalmic drops, 1 drop daily at bedtime  , Disp: , Rfl:     bismuth subsalicylate (PEPTO BISMOL) 262 MG chewable tablet, Chew 524 mg daily as needed for indigestion  , Disp: , Rfl:     Cholecalciferol (VITAMIN D3) 69960 units CAPS, TAKE 3 CAPSULES PER MONTH, Disp: 12 capsule, Rfl: 0    fluocinonide (LIDEX) 0 05 % cream, Apply topically 2 (two) times a day , Disp: , Rfl:     fluticasone (VERAMYST) 27 5 MCG/SPRAY nasal spray, 2 sprays into each nostril daily  , Disp: , Rfl:     furosemide (LASIX) 20 mg tablet, Take 20 mg by mouth 2 (two) times a day , Disp: , Rfl:     gabapentin (NEURONTIN) 300 mg capsule, Take 600 mg by mouth Medrol Dose Pack scheduling ONLY , Disp: , Rfl:     insulin aspart (NovoLOG) 100 units/mL injection, Inject 15 Units under the skin 3 (three) times a day before meals  , Disp: , Rfl:     Insulin Glargine (LANTUS SOLOSTAR) 100 UNIT/ML SOPN, Inject 50 Units under the skin Medrol Dose Pack scheduling ONLY  , Disp: , Rfl:     ISOSORBIDE MONONITRATE PO, Take 30 mg by mouth daily  , Disp: , Rfl:     metFORMIN (GLUCOPHAGE) 1000 MG tablet, Take 1,000 mg by mouth 2 (two) times a day with meals  , Disp: , Rfl:     metoprolol succinate (TOPROL-XL) 100 mg 24 hr tablet, Take 100 mg by mouth daily  , Disp: , Rfl:    multivitamin (THERAGRAN) TABS, Take 1 tablet by mouth daily  , Disp: , Rfl:     mupirocin (BACTROBAN) 2 % ointment, Apply topically 3 (three) times a day, Disp: 22 g, Rfl: 0    omeprazole (PriLOSEC) 20 mg delayed release capsule, Take 20 mg by mouth daily  , Disp: , Rfl:       Problem 1  Blood pressure well control no change in medications  Problem 2  Coronary artery sees stable no angina  Diabetes going to see the endocrinologist tomorrow no hypoglycemia no polyuria polydipsia hemoglobin A1c is close to 8 has multiple cor morbidities the goal is to get it below 8 close to 7 5 in my opinion  Knee injury will see the orthopedic doctor soon  Is ambulating without any assistive device he has less pain  Has an acute lateral meniscus tear of the left knee on MRI          The following portions of the patient's history were reviewed and updated as appropriate: allergies, current medications, past family history, past medical history, past social history, past surgical history and problem list     Review of Systems   Constitutional: Negative  Negative for activity change, appetite change, fatigue, fever and unexpected weight change  HENT: Negative for congestion, ear pain, hearing loss, mouth sores, postnasal drip, rhinorrhea, sore throat, trouble swallowing and voice change  Eyes: Negative for pain, redness and visual disturbance  Respiratory: Negative for cough, chest tightness, shortness of breath and wheezing  Cardiovascular: Negative for chest pain, palpitations and leg swelling  Gastrointestinal: Negative for abdominal distention, abdominal pain, blood in stool, constipation, diarrhea and nausea  Endocrine: Negative for cold intolerance, heat intolerance, polydipsia, polyphagia and polyuria  Genitourinary: Negative for difficulty urinating, dysuria, flank pain, frequency, hematuria and urgency  Musculoskeletal: Positive for arthralgias   Negative for back pain, gait problem, joint swelling and myalgias  Left knee due to injury   Skin: Negative for color change and pallor  Neurological: Negative for dizziness, tremors, seizures, syncope, weakness, numbness and headaches  Hematological: Negative for adenopathy  Does not bruise/bleed easily  Psychiatric/Behavioral: Negative  Negative for sleep disturbance  The patient is not nervous/anxious  Objective:    /82 (BP Location: Left arm, Patient Position: Sitting, Cuff Size: Standard)   Pulse 76   Temp 97 8 °F (36 6 °C)   Resp 15   Ht 6' 4" (1 93 m)   Wt 116 kg (255 lb)   BMI 31 04 kg/m²      Physical Exam   Constitutional: He is oriented to person, place, and time  He appears well-developed and well-nourished  HENT:   Head: Normocephalic  Right Ear: External ear normal    Left Ear: External ear normal    Nose: Nose normal    Mouth/Throat: Oropharynx is clear and moist  No oropharyngeal exudate  Eyes: Conjunctivae and EOM are normal  Pupils are equal, round, and reactive to light  Neck: Normal range of motion  Neck supple  No thyromegaly present  Cardiovascular: Normal rate, regular rhythm, normal heart sounds and intact distal pulses  Exam reveals no gallop and no friction rub  No murmur heard  Pulmonary/Chest: Effort normal and breath sounds normal  No respiratory distress  He has no wheezes  He has no rales  Abdominal: Soft  Bowel sounds are normal  He exhibits no distension and no mass  There is no tenderness  There is no rebound and no guarding  Musculoskeletal: Normal range of motion  He exhibits edema and tenderness  Left knee tenderness and swelling   Lymphadenopathy:     He has no cervical adenopathy  Neurological: He is alert and oriented to person, place, and time  Skin: Skin is warm and dry  Psychiatric: He has a normal mood and affect   His behavior is normal  Judgment normal

## 2018-03-15 NOTE — ASSESSMENT & PLAN NOTE
Follow up with Orthopedic  Abnormal and an MRI of the knee    Acute lateral meniscus tear of the left knee

## 2018-03-16 ENCOUNTER — OFFICE VISIT (OUTPATIENT)
Dept: OBGYN CLINIC | Facility: MEDICAL CENTER | Age: 73
End: 2018-03-16
Payer: MEDICARE

## 2018-03-16 VITALS
WEIGHT: 254 LBS | HEART RATE: 79 BPM | SYSTOLIC BLOOD PRESSURE: 123 MMHG | BODY MASS INDEX: 30.93 KG/M2 | HEIGHT: 76 IN | DIASTOLIC BLOOD PRESSURE: 75 MMHG

## 2018-03-16 DIAGNOSIS — S83.242A OTHER TEAR OF MEDIAL MENISCUS, CURRENT INJURY, LEFT KNEE, INITIAL ENCOUNTER: ICD-10-CM

## 2018-03-16 DIAGNOSIS — S83.282A ACUTE LATERAL MENISCUS TEAR OF LEFT KNEE, INITIAL ENCOUNTER: ICD-10-CM

## 2018-03-16 DIAGNOSIS — M17.12 PRIMARY OSTEOARTHRITIS OF LEFT KNEE: Primary | ICD-10-CM

## 2018-03-16 PROCEDURE — 99204 OFFICE O/P NEW MOD 45 MIN: CPT | Performed by: ORTHOPAEDIC SURGERY

## 2018-03-16 RX ORDER — TRAMADOL HYDROCHLORIDE 50 MG/1
50 TABLET ORAL EVERY 6 HOURS
COMMUNITY
End: 2018-09-20 | Stop reason: CLARIF

## 2018-03-16 RX ORDER — BISMUTH SUBSALICYLATE 262 MG/1
TABLET, CHEWABLE ORAL
COMMUNITY
Start: 2015-02-17 | End: 2018-03-16 | Stop reason: SDUPTHER

## 2018-03-16 RX ORDER — ERGOCALCIFEROL (VITAMIN D2) 1250 MCG
50000 CAPSULE ORAL
COMMUNITY
Start: 2014-10-23 | End: 2018-09-20 | Stop reason: SDUPTHER

## 2018-03-16 RX ORDER — LOPERAMIDE HYDROCHLORIDE 2 MG/1
CAPSULE ORAL
Status: ON HOLD | COMMUNITY
Start: 2016-10-06 | End: 2020-11-19 | Stop reason: ALTCHOICE

## 2018-03-16 RX ORDER — HYDROCODONE BITARTRATE AND ACETAMINOPHEN 5; 325 MG/1; MG/1
5-325 TABLET ORAL
COMMUNITY
Start: 2014-10-23 | End: 2018-09-20 | Stop reason: CLARIF

## 2018-03-16 RX ORDER — MIRTAZAPINE 15 MG/1
7.5 TABLET, FILM COATED ORAL
COMMUNITY
Start: 2017-12-22 | End: 2019-02-07 | Stop reason: ALTCHOICE

## 2018-03-16 RX ORDER — ESCITALOPRAM OXALATE 20 MG/1
TABLET ORAL
COMMUNITY
Start: 2004-01-09 | End: 2018-11-21 | Stop reason: ALTCHOICE

## 2018-03-16 RX ORDER — ISOSORBIDE MONONITRATE 30 MG/1
TABLET, EXTENDED RELEASE ORAL
COMMUNITY
Start: 2018-01-17 | End: 2018-05-18 | Stop reason: SDUPTHER

## 2018-03-16 RX ORDER — INSULIN ASPART 100 [IU]/ML
INJECTION, SOLUTION INTRAVENOUS; SUBCUTANEOUS
COMMUNITY
Start: 2018-01-25 | End: 2018-09-26 | Stop reason: SDUPTHER

## 2018-03-16 NOTE — PROGRESS NOTES
Orthopaedic Surgery - Office Note  Chely Mortensen (26 y o  male)   : 1945   MRN: [id number]  Encounter Date: 3/16/2018    Chief Complaint   Patient presents with    Left Knee - Pain       Assessment / Plan  Left knee OA with degenerative medial meniscus tear    · Activity as tolerated  · WBAT  · Begin outpatient PT for hip and thigh strengthening  · Anti-inflammatories or Tylenol prn pain  Return in about 1 month (around 2018)  History of Present Illness  Chely Mortensen is a 74 y o  male who presents as a referral from my partner for evaluation of left knee injury that occurred around 18  He was moving a cabinet when he fell and twisted his left knee  Pain is anterior and medial and radiates to the medial ankle  He does have some numbness in the feet due to diabetic neuropathy  He denies locking or instability  He does have swelling in the knee  He has had some knee discomfort at baseline due to known knee OA  He also has a history of knee scope for partial meniscectomy in the past   He has had knee CSI as well with varying results  Review of Systems  Pertinent items are noted in HPI  All other systems were reviewed and are negative  Physical Exam  /75   Pulse 79   Ht 6' 4" (1 93 m)   Wt 115 kg (254 lb)   BMI 30 92 kg/m²   Cons: Appears well  No apparent distress  Psych: Alert  Oriented x3  Mood and affect normal   Eyes: PERRLA, EOMI  Resp: Normal effort  No audible wheezing or stridor  CV: Palpable pulse  No discernable arrhythmia  No LE edema  Lymph:  No palpable cervical, axillary, or inguinal lymphadenopathy  Skin: Warm  No palpable masses  No visible lesions  Neuro: Normal muscle tone  Normal and symmetric DTR's  Left Knee Exam  Alignment:  mild genu varus  Inspection:  mild swelling  moderate quad muscle atrophy  Palpation:  medial and lateral tenderness  small effusion  ROM:  Knee Extension 10  Knee Flexion 125  Strength:  Quadriceps 4/5  Hamstrings 4/5  Stability:  No objective knee instability  Stable Varus / Valgus stress, Lachman, and Posterior drawer  Tests:  (+) Christian  Patella:  Patella tracks centrally with crepitus  Neurovascular:  Sensation intact in DP/SP/Chaney/Sa/T nerve distributions  2+ DP & PT pulses  Gait:  Heel-to-toe  Antalgic  Studies Reviewed  I have personally reviewed pertinent films in PACS  XR of left knee - moderate knee OA  MRI of left knee - degenerative changes again seen  Complex tear of medial meniscus also noted  Procedures  No procedures today  Medical, Surgical, Family, and Social History  The patient's medical history, family history, and social history, were reviewed and updated as appropriate  Past Medical History:   Diagnosis Date    Anemia     Last assessed: 9/28/17    Arteriosclerotic cardiovascular disease     Last assessed: 9/28/17    [facility]     Cardiac disease     Diabetes mellitus ([facility] )     Hypertension     Hypotension     Last assessed: 2/24/15    Insomnia     Last assessed: 11/14/12    Other seasonal allergic rhinitis     Last assessed: 2/10/16    Transient cerebral ischemia        Past Surgical History:   Procedure Laterality Date    CARDIAC SURGERY      Cath stent placement  Last assessed: 3/9/17  Interventional Catheterization    CHOLECYSTECTOMY      CYSTOSCOPY      Diagnostic w/biopsy  Ethelle Samples  Last assessed: 12/1/14    CYSTOURETHROSCOPY      w/cautery  Ethelle Samples    GASTRIC BYPASS      For morbid obesity w/Shaji-en-Y   Resolved: 11/17/09    INCISION AND DRAINAGE OF WOUND Right 2/26/2017    Procedure: INCISION AND DRAINAGE (I&D) EXTREMITY WITH APPLICATION OF GRAFT JACKET;  Surgeon: Graciela Lee DPM;  Location: [facility];  Service:     INCISION AND DRAINAGE OF WOUND Right 4/25/2017    Procedure: INCISION AND DRAINAGE (I&D) EXTREMITY, APPLICATION OF GRAFT;  Surgeon: Graciela Lee DPM;  Location: [facility];  Service:     JOINT REPLACEMENT      Knee  Last assessed: 1/28/11    NE CYSTOURETHROSCOPY,BIOPSY N/A 8/16/2016    Procedure: Ifeoma Roll;  Surgeon: Terence Bocanegra MD;  Location:  MAIN OR;  Service: Urology    ROTATOR CUFF REPAIR      SMALL INTESTINE SURGERY      Surgery Shaji-en-Y    VAC DRESSING APPLICATION Right 4/63/6126    Procedure: APPLICATION VAC DRESSING;  Surgeon: Edin Buchanan DPM;  Location: [facility];  Service:        Family History   Problem Relation Age of Onset    Diabetes Mother     Heart disease Mother     Other Mother      High blood pressure    Heart disease Father     Diabetes Sister     Other Sister      High blood pressure    Kidney disease Sister     Heart disease Brother     Other Brother      High blood pressure       Social History     Occupational History    Not on file  Social History Main Topics    Smoking status: Former Smoker    Smokeless tobacco: Never Used    Alcohol use No    Drug use: No    Sexual activity: Not on file       Allergies   Allergen Reactions    Atorvastatin Hives, Other (See Comments) and Itching     Pt  Reports "Hives"  Lipitor and Simvastatin    Simvastatin Rash     Other reaction(s): Edema, Other Reaction  "ALL STATINS"    Insulin Lispro Edema    Statins Itching    Other Other (See Comments), Itching and Rash     rash to electrodes  rash to electrodes and adhesives         Current Outpatient Prescriptions:     aspirin 81 MG tablet, Take 81 mg by mouth daily  , Disp: , Rfl:     Azelastine HCl 0 15 % SOLN, Inhale 1 spray 2 (two) times a day, Disp: , Rfl: 0    bimatoprost (LUMIGAN) 0 01 % ophthalmic drops, 1 drop daily at bedtime  , Disp: , Rfl:     bismuth subsalicylate (PEPTO BISMOL) 262 MG chewable tablet, Chew 524 mg daily as needed for indigestion  , Disp: , Rfl:     Cholecalciferol (VITAMIN D3) 64967 units CAPS, TAKE 3 CAPSULES PER MONTH, Disp: 12 capsule, Rfl: 0    ergocalciferol (ERGOCALCIFEROL) 85916 units capsule, Take 50,000 Units by mouth, Disp: , Rfl:     escitalopram (LEXAPRO) 20 mg tablet, 1/d, Disp: , Rfl:     esomeprazole (NEXIUM) 20 mg capsule, 1/d, Disp: , Rfl:     fluticasone (VERAMYST) 27 5 MCG/SPRAY nasal spray, 2 sprays into each nostril daily  , Disp: , Rfl:     furosemide (LASIX) 20 mg tablet, Take 20 mg by mouth 2 (two) times a day , Disp: , Rfl:     gabapentin (NEURONTIN) 300 mg capsule, Take 600 mg by mouth Medrol Dose Pack scheduling ONLY , Disp: , Rfl:     HYDROcodone-acetaminophen (NORCO) 5-325 mg per tablet, Take 5-325 mg by mouth, Disp: , Rfl:     insulin aspart (NovoLOG) 100 units/mL injection, Inject 15 Units under the skin 3 (three) times a day before meals  , Disp: , Rfl:     Insulin Glargine (LANTUS SOLOSTAR) 100 UNIT/ML SOPN, Inject 50 Units under the skin Medrol Dose Pack scheduling ONLY  , Disp: , Rfl:     insulin lispro (HUMALOG KWIKPEN) 100 Units/mL SOPN,  AD, Disp: , Rfl:     insulin lispro protamine-insulin lispro (HUMALOG MIX 75/25) 100 units/mL, as directed, Disp: , Rfl:     isosorbide mononitrate (IMDUR) 30 mg 24 hr tablet, , Disp: , Rfl:     loperamide (IMODIUM) 2 mg capsule, Take by mouth, Disp: , Rfl:     metFORMIN (GLUCOPHAGE) 1000 MG tablet, Take 1,000 mg by mouth 2 (two) times a day with meals  , Disp: , Rfl:     metoprolol succinate (TOPROL-XL) 100 mg 24 hr tablet, Take 100 mg by mouth daily  , Disp: , Rfl:     multivitamin (THERAGRAN) TABS, Take 1 tablet by mouth daily  , Disp: , Rfl:     mupirocin (BACTROBAN) 2 % nasal ointment, , Disp: , Rfl:     NOVOLOG FLEXPEN 100 UNIT/ML SOPN, , Disp: , Rfl:     omeprazole (PriLOSEC) 20 mg delayed release capsule, Take 20 mg by mouth daily  , Disp: , Rfl:     traMADol (ULTRAM) 50 mg tablet, Take 50 mg by mouth every 6 (six) hours, Disp: , Rfl:     fluocinonide (LIDEX) 0 05 % cream, Apply topically 2 (two) times a day , Disp: , Rfl:     mirtazapine (REMERON) 15 mg tablet, , Disp: , Rfl:       MD Any Newton Attestation    I,: am acting as a scribe while in the presence of the attending physician :        I,:    personally performed the services described in this documentation    as scribed in my presence :

## 2018-03-20 ENCOUNTER — TELEPHONE (OUTPATIENT)
Dept: UROLOGY | Facility: AMBULATORY SURGERY CENTER | Age: 73
End: 2018-03-20

## 2018-03-20 NOTE — TELEPHONE ENCOUNTER
Patient called would like records sent to Dr Braxton Fore fax #[phone]  He can be reached at [phone]  He appt with then is 3/28

## 2018-03-21 DIAGNOSIS — I10 ESSENTIAL (PRIMARY) HYPERTENSION: ICD-10-CM

## 2018-03-26 ENCOUNTER — TELEPHONE (OUTPATIENT)
Dept: OBGYN CLINIC | Facility: HOSPITAL | Age: 73
End: 2018-03-26

## 2018-03-26 NOTE — TELEPHONE ENCOUNTER
I talked to patient and Dr Bill Garcia, He needs appointment to be seen for Foot pain  Please schedule this for him    Shonda Jefferson

## 2018-03-26 NOTE — TELEPHONE ENCOUNTER
Patient sees Dr Madelyn Mcdowell  He was in the office on 03/16  He knows that he is going to get a knee replacement, but he is experiencing most of the pain in his foot  Is the due to the knee? He is asking if something has to be done to the foot as well

## 2018-03-28 ENCOUNTER — APPOINTMENT (OUTPATIENT)
Dept: RADIOLOGY | Facility: OTHER | Age: 73
End: 2018-03-28
Payer: MEDICARE

## 2018-03-28 ENCOUNTER — OFFICE VISIT (OUTPATIENT)
Dept: OBGYN CLINIC | Facility: OTHER | Age: 73
End: 2018-03-28
Payer: MEDICARE

## 2018-03-28 VITALS
WEIGHT: 254 LBS | HEART RATE: 82 BPM | DIASTOLIC BLOOD PRESSURE: 73 MMHG | HEIGHT: 76 IN | BODY MASS INDEX: 30.93 KG/M2 | SYSTOLIC BLOOD PRESSURE: 114 MMHG

## 2018-03-28 DIAGNOSIS — M17.12 PRIMARY OSTEOARTHRITIS OF LEFT KNEE: ICD-10-CM

## 2018-03-28 DIAGNOSIS — M79.672 PAIN IN LEFT FOOT: ICD-10-CM

## 2018-03-28 DIAGNOSIS — M79.672 PAIN IN LEFT FOOT: Primary | ICD-10-CM

## 2018-03-28 PROCEDURE — 73630 X-RAY EXAM OF FOOT: CPT

## 2018-03-28 PROCEDURE — 99213 OFFICE O/P EST LOW 20 MIN: CPT | Performed by: ORTHOPAEDIC SURGERY

## 2018-03-28 RX ORDER — GABAPENTIN 100 MG/1
300 CAPSULE ORAL ONCE
Status: CANCELLED | OUTPATIENT
Start: 2018-03-28 | End: 2018-03-28

## 2018-03-28 RX ORDER — FOLIC ACID 1 MG/1
1 TABLET ORAL DAILY
Qty: 30 TABLET | Refills: 1 | Status: SHIPPED | OUTPATIENT
Start: 2018-03-28 | End: 2018-11-21 | Stop reason: ALTCHOICE

## 2018-03-28 RX ORDER — ACETAMINOPHEN 325 MG/1
975 TABLET ORAL ONCE
Status: CANCELLED | OUTPATIENT
Start: 2018-03-28 | End: 2018-03-28

## 2018-03-28 RX ORDER — FERROUS SULFATE TAB EC 324 MG (65 MG FE EQUIVALENT) 324 (65 FE) MG
324 TABLET DELAYED RESPONSE ORAL
Qty: 60 TABLET | Refills: 1 | Status: SHIPPED | OUTPATIENT
Start: 2018-03-28 | End: 2018-05-18

## 2018-03-28 NOTE — PATIENT INSTRUCTIONS
Thank you for enrolling in [address] [facility]  Please follow the instructions below to securely access your online medical record  [employer] allows you to send messages to your doctor, view your test results, renew your prescriptions, schedule appointments, and more  How Do I Sign Up? 1  In your Internet browser, go to [email or web address]   2  Click on the Sign Up Now link in the New User? box    3  Enter your urturn Activation Code exactly as it appears below  You will not need to use this code after you have completed the sign-up process  If you do not sign up before the expiration date, you must request a new code  urturn Activation Code: [email or web address]  Expires: 3/31/2018  3:41 PM    4  Enter the last four digits of your Social Security Number and your Date of Birth as indicated and click Next  You will be taken to the next sign-up page  5  Create a urturn username  Think of one that is secure and easy to remember  6  Create a urturn password  You can change your password at any time  7  Choose a security question, enter your answer, and click Next  This can be used to access urturn if you forget your password  8  Select your communication preference  Enter a valid e-mail address if you would like to receive e-mail notifications when new information is available in [address]  9  Click Sign In  You can now view your medical record  Additional Information  If you have [email or web address] or call [phone] to talk to our [address] staff  Remember, urturn is NOT to be used for urgent needs  For medical emergencies, dial 911

## 2018-03-28 NOTE — PROGRESS NOTES
Orthopaedic Surgery - Office Note  Carlitos Cruz (1 y o  male)   : 1945   MRN: [id number]  Encounter Date: 3/28/2018    No chief complaint on file  Assessment / Plan  Left knee OA with degenerative medial meniscus tear DOI 2018  Left tibialis posterior tendonitis     · The diagnosis and treatment options were reviewed  · The patient wishes to proceed with left knee total joint arthroplasty     · The risks, benefits, and alternatives were discussed  · Written consent was obtained  · Return 2 weeks post op  History of Present Illness  Carlitos Cruz is a 74 y o  male who presents for surgical consultation in regards to left knee injury originally sustained 2018  He is also being seen for evaluation of left foot pain that originated as a result of the same incident  He reports his foot pain as medial, sharp with prolonged standing and walking, 6/10 pain  He denies any bruising, swelling, mechanical symptoms, or instability  He does report numbness and tingling, but he knows that these are pre-existing conditions related to diabetic neuropathy  Up to this point has not pursued any specific treatment in regards to this issue  In regards to his left knee, his symptoms remain unchanged since his previous visit  Review of Systems  Pertinent items are noted in HPI  All other systems were reviewed and are negative  Physical Exam  /73   Pulse 82   Ht 6' 4" (1 93 m)   Wt 115 kg (254 lb)   BMI 30 92 kg/m²   Cons: Appears well  No apparent distress  Psych: Alert  Oriented x3  Mood and affect normal   Eyes: PERRLA, EOMI  Resp: Normal effort  No audible wheezing or stridor  CV: Palpable pulse  No discernable arrhythmia  No LE edema  Lymph:  No palpable cervical, axillary, or inguinal lymphadenopathy  Skin: Warm  No palpable masses  No visible lesions  Neuro: Normal muscle tone  Normal and symmetric DTR's  Left Foot & Ankle Exam  Alignment:  Mild genu varus  Normal ankle alignment  Inspection:  No swelling  No edema  No erythema  No ecchymosis  No muscle atrophy  No deformity  Palpation:  Moderate tenderness at Distal tibialis posterior tendon pathway     ROM:  Normal ankle ROM  Strength:  Anterior tibialis 4+/5  Gastroc/Soleus 5/5  Posterior tibialis For/5  Peroneals 5/5  EHL 5/5  FHL 5/5  Able to actively dorsiflex & plantarflex ankle and toes  Unable to perform single-leg heel rise  Stability:  No objective ankle instablity  (-) Anterior  neutral and PF  (-) Talar Tilt  Tests:  (-) Syndesmosis squeeze test  (-) Deleon test  (-) Peroneal tendon subluxation  (-) Calcaneus squeeze test   Neurovascular:  Sensation intact in DP/SP/Chaney/Sa/T nerve distributions  2+ DP & PT pulses  Gait:  Steady with cane  Antalgic  Studies Reviewed  XR of left foot - Mild degenerative changes noted in the talocrural joint  Otherwise no osseous abnormalities or malalignment noted  Procedures  No procedures today  Medical, Surgical, Family, and Social History  The patient's medical history, family history, and social history, were reviewed and updated as appropriate  Past Medical History:   Diagnosis Date    Anemia     Last assessed: 9/28/17    Arteriosclerotic cardiovascular disease     Last assessed: 9/28/17    Bladder cancer ([facility]     Cardiac disease     Diabetes mellitus ([facility]     Hypertension     Hypotension     Last assessed: 2/24/15    Insomnia     Last assessed: 11/14/12    Other seasonal allergic rhinitis     Last assessed: 2/10/16    Transient cerebral ischemia        Past Surgical History:   Procedure Laterality Date    CARDIAC SURGERY      Cath stent placement  Last assessed: 3/9/17  Interventional Catheterization    CHOLECYSTECTOMY      CYSTOSCOPY      Diagnostic w/biopsy  Nasrin Starcher  Last assessed: 12/1/14    CYSTOURETHROSCOPY      w/cautery  Nasrin Starcher    GASTRIC BYPASS      For morbid obesity w/Shaji-en-Y   Resolved: 11/17/09    INCISION AND DRAINAGE OF WOUND Right 2/26/2017    Procedure: INCISION AND DRAINAGE (I&D) EXTREMITY WITH APPLICATION OF GRAFT JACKET;  Surgeon: Tim Gottron, DPM;  Location: [facility];  Service:     INCISION AND DRAINAGE OF WOUND Right 4/25/2017    Procedure: INCISION AND DRAINAGE (I&D) EXTREMITY, APPLICATION OF GRAFT;  Surgeon: Tim Gottron, DPM;  Location: [facility];  Service:     JOINT REPLACEMENT      Knee  Last assessed: 1/28/11    [place] CYSTOURETHROSCOPY,BIOPSY N/A 8/16/2016    Procedure: Fronie Negus;  Surgeon: Wei Rosen MD;  [facility]: [facility];  Service: Urology    ROTATOR CUFF REPAIR      SMALL INTESTINE SURGERY      Surgery Shaji-en-Y    VAC DRESSING APPLICATION Right 2/57/7015    Procedure: APPLICATION VAC DRESSING;  Surgeon: Tim Gottron, DPM;  Location: [facility];  Service:        Family History   Problem Relation Age of Onset    Diabetes Mother     Heart disease Mother     Other Mother      High blood pressure    Heart disease Father     Diabetes Sister     Other Sister      High blood pressure    Kidney disease Sister     Heart disease Brother     Other Brother      High blood pressure       Social History     Occupational History    Not on file  Social History Main Topics    Smoking status: Former Smoker    Smokeless tobacco: Never Used    Alcohol use No    Drug use: No    Sexual activity: Not on file       Allergies   Allergen Reactions    Atorvastatin Hives, Other (See Comments) and Itching     Pt  Reports "Hives"  Lipitor and Simvastatin    Simvastatin Rash     Other reaction(s): Edema, Other Reaction  "ALL STATINS"    Insulin Lispro Edema    Statins Itching    Other Other (See Comments), Itching and Rash     rash to electrodes  rash to electrodes and adhesives         Current Outpatient Prescriptions:     aspirin 81 MG tablet, Take 81 mg by mouth daily  , Disp: , Rfl:     Azelastine HCl 0 15 % SOLN, Inhale 1 spray 2 (two) times a day, Disp: , Rfl: 0    bimatoprost (LUMIGAN) 0 01 % ophthalmic drops, 1 drop daily at bedtime  , Disp: , Rfl:     bismuth subsalicylate (PEPTO BISMOL) 262 MG chewable tablet, Chew 524 mg daily as needed for indigestion  , Disp: , Rfl:     Cholecalciferol (VITAMIN D3) 86294 units CAPS, TAKE 3 CAPSULES PER MONTH, Disp: 12 capsule, Rfl: 0    ergocalciferol (ERGOCALCIFEROL) 87794 units capsule, Take 50,000 Units by mouth, Disp: , Rfl:     escitalopram (LEXAPRO) 20 mg tablet, 1/d, Disp: , Rfl:     esomeprazole (NEXIUM) 20 mg capsule, 1/d, Disp: , Rfl:     fluocinonide (LIDEX) 0 05 % cream, Apply topically 2 (two) times a day , Disp: , Rfl:     fluticasone (VERAMYST) 27 5 MCG/SPRAY nasal spray, 2 sprays into each nostril daily  , Disp: , Rfl:     furosemide (LASIX) 20 mg tablet, Take 20 mg by mouth 2 (two) times a day , Disp: , Rfl:     gabapentin (NEURONTIN) 300 mg capsule, Take 600 mg by mouth Medrol Dose Pack scheduling ONLY , Disp: , Rfl:     HYDROcodone-acetaminophen (NORCO) 5-325 mg per tablet, Take 5-325 mg by mouth, Disp: , Rfl:     insulin aspart (NovoLOG) 100 units/mL injection, Inject 15 Units under the skin 3 (three) times a day before meals  , Disp: , Rfl:     Insulin Glargine (LANTUS SOLOSTAR) 100 UNIT/ML SOPN, Inject 50 Units under the skin Medrol Dose Pack scheduling ONLY  , Disp: , Rfl:     insulin lispro (HUMALOG KWIKPEN) 100 Units/mL SOPN,  AD, Disp: , Rfl:     insulin lispro protamine-insulin lispro (HUMALOG MIX 75/25) 100 units/mL, as directed, Disp: , Rfl:     isosorbide mononitrate (IMDUR) 30 mg 24 hr tablet, , Disp: , Rfl:     loperamide (IMODIUM) 2 mg capsule, Take by mouth, Disp: , Rfl:     metFORMIN (GLUCOPHAGE) 1000 MG tablet, Take 1,000 mg by mouth 2 (two) times a day with meals  , Disp: , Rfl:     Mirabegron ER 50 MG TB24, Take 50 mg by mouth, Disp: , Rfl:     Mirabegron ER 50 MG TB24, Take 50 mg by mouth, Disp: , Rfl:     mirtazapine (REMERON) 15 mg tablet, , Disp: , Rfl:     multivitamin (THERAGRAN) TABS, Take 1 tablet by mouth daily  , Disp: , Rfl:     mupirocin (BACTROBAN) 2 % nasal ointment, , Disp: , Rfl:     NOVOLOG FLEXPEN 100 UNIT/ML SOPN, , Disp: , Rfl:     omeprazole (PriLOSEC) 20 mg delayed release capsule, Take 20 mg by mouth daily  , Disp: , Rfl:     traMADol (ULTRAM) 50 mg tablet, Take 50 mg by mouth every 6 (six) hours, Disp: , Rfl:     metoprolol succinate (TOPROL-XL) 100 mg 24 hr tablet, Take 100 mg by mouth daily  , Disp: , Rfl:       Sharmila Farah    I,:   Marcelo Dudley am acting as a scribe while in the presence of the attending physician :        I,:   Joon Vazquez MD personally performed the services described in this documentation    as scribed in my presence :

## 2018-04-11 ENCOUNTER — TRANSCRIBE ORDERS (OUTPATIENT)
Dept: LAB | Facility: CLINIC | Age: 73
End: 2018-04-11

## 2018-04-11 ENCOUNTER — APPOINTMENT (OUTPATIENT)
Dept: LAB | Facility: CLINIC | Age: 73
End: 2018-04-11
Payer: MEDICARE

## 2018-04-11 DIAGNOSIS — E11.65 UNCONTROLLED TYPE 2 DIABETES MELLITUS WITH COMPLICATION, UNSPECIFIED WHETHER LONG TERM INSULIN USE: Primary | ICD-10-CM

## 2018-04-11 DIAGNOSIS — E11.8 UNCONTROLLED TYPE 2 DIABETES MELLITUS WITH COMPLICATION, UNSPECIFIED WHETHER LONG TERM INSULIN USE: Primary | ICD-10-CM

## 2018-04-11 LAB
EST. AVERAGE GLUCOSE BLD GHB EST-MCNC: 220 MG/DL
HBA1C MFR BLD: 9.3 % (ref 4.2–6.3)

## 2018-04-11 PROCEDURE — 83036 HEMOGLOBIN GLYCOSYLATED A1C: CPT

## 2018-04-11 PROCEDURE — 36415 COLL VENOUS BLD VENIPUNCTURE: CPT

## 2018-04-19 ENCOUNTER — OFFICE VISIT (OUTPATIENT)
Dept: INTERNAL MEDICINE CLINIC | Facility: CLINIC | Age: 73
End: 2018-04-19
Payer: MEDICARE

## 2018-04-19 VITALS
WEIGHT: 253 LBS | TEMPERATURE: 97.7 F | SYSTOLIC BLOOD PRESSURE: 127 MMHG | BODY MASS INDEX: 30.8 KG/M2 | RESPIRATION RATE: 15 BRPM | DIASTOLIC BLOOD PRESSURE: 80 MMHG | HEART RATE: 81 BPM

## 2018-04-19 DIAGNOSIS — E11.622 TYPE 2 DIABETES MELLITUS WITH OTHER SKIN ULCER, WITH LONG-TERM CURRENT USE OF INSULIN (HCC): ICD-10-CM

## 2018-04-19 DIAGNOSIS — J06.9 UPPER RESPIRATORY TRACT INFECTION, UNSPECIFIED TYPE: Primary | ICD-10-CM

## 2018-04-19 DIAGNOSIS — I25.10 CORONARY ARTERY DISEASE INVOLVING NATIVE HEART WITHOUT ANGINA PECTORIS, UNSPECIFIED VESSEL OR LESION TYPE: ICD-10-CM

## 2018-04-19 DIAGNOSIS — Z79.4 TYPE 2 DIABETES MELLITUS WITH OTHER SKIN ULCER, WITH LONG-TERM CURRENT USE OF INSULIN (HCC): ICD-10-CM

## 2018-04-19 DIAGNOSIS — J44.9 CHRONIC OBSTRUCTIVE PULMONARY DISEASE, UNSPECIFIED COPD TYPE (HCC): ICD-10-CM

## 2018-04-19 PROCEDURE — 99214 OFFICE O/P EST MOD 30 MIN: CPT | Performed by: INTERNAL MEDICINE

## 2018-04-19 RX ORDER — AZITHROMYCIN 250 MG/1
250 TABLET, FILM COATED ORAL EVERY 24 HOURS
Qty: 6 TABLET | Refills: 0 | Status: SHIPPED | OUTPATIENT
Start: 2018-04-19 | End: 2018-04-24

## 2018-04-19 RX ORDER — BENZONATATE 100 MG/1
100 CAPSULE ORAL 3 TIMES DAILY PRN
Qty: 20 CAPSULE | Refills: 0 | Status: SHIPPED | OUTPATIENT
Start: 2018-04-19 | End: 2018-09-20 | Stop reason: CLARIF

## 2018-04-19 RX ORDER — FLUTICASONE PROPIONATE 50 MCG
1 SPRAY, SUSPENSION (ML) NASAL DAILY
Qty: 16 G | Refills: 0 | Status: SHIPPED | OUTPATIENT
Start: 2018-04-19 | End: 2018-09-20 | Stop reason: SDUPTHER

## 2018-04-19 NOTE — PROGRESS NOTES
Assessment/Plan:    Upper respiratory tract infection   Will start him on Zithromax pack Tessalon  Perles for cough and Flonase nasal spray    CAD (coronary artery disease)   No angina  Chronic obstructive pulmonary disease (HCC)    Antibiotic should improve the symptomatology he does not take any inhalers    Diabetes mellitus (HCC)   No polyuria polydipsia good news         Problem List Items Addressed This Visit     CAD (coronary artery disease)      No angina  Chronic obstructive pulmonary disease (HCC)       Antibiotic should improve the symptomatology he does not take any inhalers         Relevant Medications    fluticasone (FLONASE) 50 mcg/act nasal spray    benzonatate (TESSALON PERLES) 100 mg capsule    Diabetes mellitus (HCC)      No polyuria polydipsia good news         Upper respiratory tract infection - Primary      Will start him on Zithromax pack Tessalon  Perles for cough and Flonase nasal spray         Relevant Medications    azithromycin (ZITHROMAX) 250 mg tablet    fluticasone (FLONASE) 50 mcg/act nasal spray    benzonatate (TESSALON PERLES) 100 mg capsule            Subjective:      Patient ID: Kym Rose is a 74 y o  male  Chief Complaint   Patient presents with    Cough     x5 days  using cough drops   Sore Throat    Headache    chest congestion         Current Outpatient Prescriptions:     ascorbic Acid (VITAMIN C) 500 MG CPCR, Take 1 capsule (500 mg total) by mouth 2 (two) times a day, Disp: 60 capsule, Rfl: 1    aspirin 81 MG tablet, Take 81 mg by mouth daily  , Disp: , Rfl:     Azelastine HCl 0 15 % SOLN, Inhale 1 spray 2 (two) times a day, Disp: , Rfl: 0    azithromycin (ZITHROMAX) 250 mg tablet, Take 1 tablet (250 mg total) by mouth every 24 hours for 5 days, Disp: 6 tablet, Rfl: 0    benzonatate (TESSALON PERLES) 100 mg capsule, Take 1 capsule (100 mg total) by mouth 3 (three) times a day as needed for cough, Disp: 20 capsule, Rfl: 0    bimatoprost (LUMIGAN) 0 01 % ophthalmic drops, 1 drop daily at bedtime  , Disp: , Rfl:     bismuth subsalicylate (PEPTO BISMOL) 262 MG chewable tablet, Chew 524 mg daily as needed for indigestion  , Disp: , Rfl:     Cholecalciferol (VITAMIN D3) 20264 units CAPS, TAKE 3 CAPSULES PER MONTH, Disp: 12 capsule, Rfl: 0    ergocalciferol (ERGOCALCIFEROL) 59464 units capsule, Take 50,000 Units by mouth, Disp: , Rfl:     escitalopram (LEXAPRO) 20 mg tablet, 1/d, Disp: , Rfl:     esomeprazole (NEXIUM) 20 mg capsule, 1/d, Disp: , Rfl:     ferrous sulfate 324 (65 Fe) mg, Take 1 tablet (324 mg total) by mouth 2 (two) times a day before meals, Disp: 60 tablet, Rfl: 1    fluocinonide (LIDEX) 0 05 % cream, Apply topically 2 (two) times a day , Disp: , Rfl:     fluticasone (FLONASE) 50 mcg/act nasal spray, 1 spray into each nostril daily, Disp: 16 g, Rfl: 0    fluticasone (VERAMYST) 27 5 MCG/SPRAY nasal spray, 2 sprays into each nostril daily  , Disp: , Rfl:     folic acid (FOLVITE) 1 mg tablet, Take 1 tablet (1 mg total) by mouth daily, Disp: 30 tablet, Rfl: 1    furosemide (LASIX) 20 mg tablet, Take 20 mg by mouth 2 (two) times a day , Disp: , Rfl:     gabapentin (NEURONTIN) 300 mg capsule, Take 600 mg by mouth Medrol Dose Pack scheduling ONLY , Disp: , Rfl:     HYDROcodone-acetaminophen (NORCO) 5-325 mg per tablet, Take 5-325 mg by mouth, Disp: , Rfl:     insulin aspart (NovoLOG) 100 units/mL injection, Inject 15 Units under the skin 3 (three) times a day before meals  , Disp: , Rfl:     Insulin Glargine (LANTUS SOLOSTAR) 100 UNIT/ML SOPN, Inject 50 Units under the skin Medrol Dose Pack scheduling ONLY  , Disp: , Rfl:     insulin lispro (HUMALOG KWIKPEN) 100 Units/mL SOPN,  AD, Disp: , Rfl:     insulin lispro protamine-insulin lispro (HUMALOG MIX 75/25) 100 units/mL, as directed, Disp: , Rfl:     isosorbide mononitrate (IMDUR) 30 mg 24 hr tablet, , Disp: , Rfl:     loperamide (IMODIUM) 2 mg capsule, Take by mouth, Disp: , Rfl:    metFORMIN (GLUCOPHAGE) 1000 MG tablet, Take 1,000 mg by mouth 2 (two) times a day with meals  , Disp: , Rfl:     metoprolol succinate (TOPROL-XL) 100 mg 24 hr tablet, Take 100 mg by mouth daily  , Disp: , Rfl:     Mirabegron ER 50 MG TB24, Take 50 mg by mouth, Disp: , Rfl:     Mirabegron ER 50 MG TB24, Take 50 mg by mouth, Disp: , Rfl:     mirtazapine (REMERON) 15 mg tablet, , Disp: , Rfl:     multivitamin (THERAGRAN) TABS, Take 1 tablet by mouth daily  , Disp: , Rfl:     mupirocin (BACTROBAN) 2 % nasal ointment, , Disp: , Rfl:     NOVOLOG FLEXPEN 100 UNIT/ML SOPN, , Disp: , Rfl:     omeprazole (PriLOSEC) 20 mg delayed release capsule, Take 20 mg by mouth daily  , Disp: , Rfl:     traMADol (ULTRAM) 50 mg tablet, Take 50 mg by mouth every 6 (six) hours, Disp: , Rfl:       Upper respiratory tract infection came in with cough sore throat yellow phlegm for about 3-4 days no sick contacts multiple cor morbidities came in for further evaluation denies any chest pain or shortness of breath  No hemoptysis  No night sweat  Visited his mother-in-law at the hospital     Diabetes no polyuria polydipsia    Blood pressure control    Bladder cancer no gross hematuria  Cough   Associated symptoms include headaches, rhinorrhea and a sore throat  Sore Throat    Associated symptoms include coughing and headaches  Headache    Associated symptoms include coughing, rhinorrhea and a sore throat  The following portions of the patient's history were reviewed and updated as appropriate: allergies, current medications, past family history, past medical history, past social history, past surgical history and problem list     Review of Systems   HENT: Positive for rhinorrhea, sinus pain and sore throat  Respiratory: Positive for cough  Neurological: Positive for headaches           Objective:    /80 (BP Location: Left arm, Patient Position: Sitting, Cuff Size: Large)   Pulse 81   Temp 97 7 °F (36 5 °C) Resp 15   Wt 115 kg (253 lb)   BMI 30 80 kg/m²      Physical Exam   Constitutional: He is oriented to person, place, and time  He appears well-developed and well-nourished  HENT:   Head: Normocephalic  Right Ear: External ear normal    Left Ear: External ear normal    Nose: Mucosal edema and rhinorrhea present  Right sinus exhibits maxillary sinus tenderness  Left sinus exhibits maxillary sinus tenderness  Mouth/Throat: Oropharynx is clear and moist  No oropharyngeal exudate  Eyes: Conjunctivae and EOM are normal  Pupils are equal, round, and reactive to light  Neck: Normal range of motion  Neck supple  No thyromegaly present  Cardiovascular: Normal rate, regular rhythm, normal heart sounds and intact distal pulses  Exam reveals no gallop and no friction rub  No murmur heard  Pulmonary/Chest: Effort normal and breath sounds normal  No respiratory distress  He has no wheezes  He has no rales  Lungs clear no wheezing rales or rhonchi   Abdominal: Soft  Bowel sounds are normal  He exhibits no distension and no mass  There is no tenderness  There is no rebound and no guarding  Musculoskeletal: Normal range of motion  Lymphadenopathy:     He has no cervical adenopathy  Neurological: He is alert and oriented to person, place, and time  Skin: Skin is warm and dry  Psychiatric: He has a normal mood and affect  His behavior is normal  Judgment normal    Nursing note and vitals reviewed

## 2018-04-20 LAB
CHOLEST SERPL-MCNC: 137 MG/DL (ref 50–200)
HBA1C MFR BLD: 9.2 % (ref 4.2–6.3)
HDLC SERPL-MCNC: 33 MG/DL (ref 40–60)
LDLC SERPL DIRECT ASSAY-MCNC: 59.3 MG/DL
TRIGL SERPL-MCNC: 230 MG/DL (ref ?–150)

## 2018-04-23 RX ORDER — FUROSEMIDE 20 MG/1
TABLET ORAL
Qty: 90 TABLET | Refills: 1 | OUTPATIENT
Start: 2018-04-23

## 2018-05-06 DIAGNOSIS — M54.5 LOW BACK PAIN, UNSPECIFIED BACK PAIN LATERALITY, UNSPECIFIED CHRONICITY, WITH SCIATICA PRESENCE UNSPECIFIED: Primary | ICD-10-CM

## 2018-05-07 RX ORDER — GABAPENTIN 300 MG/1
CAPSULE ORAL
Qty: 180 CAPSULE | Refills: 1 | Status: SHIPPED | OUTPATIENT
Start: 2018-05-07 | End: 2018-10-25 | Stop reason: SDUPTHER

## 2018-05-18 ENCOUNTER — OFFICE VISIT (OUTPATIENT)
Dept: INTERNAL MEDICINE CLINIC | Facility: CLINIC | Age: 73
End: 2018-05-18
Payer: MEDICARE

## 2018-05-18 VITALS
BODY MASS INDEX: 30.93 KG/M2 | HEIGHT: 76 IN | DIASTOLIC BLOOD PRESSURE: 83 MMHG | HEART RATE: 76 BPM | TEMPERATURE: 97.1 F | WEIGHT: 254 LBS | SYSTOLIC BLOOD PRESSURE: 149 MMHG | RESPIRATION RATE: 15 BRPM

## 2018-05-18 DIAGNOSIS — N13.8 BPH WITH URINARY OBSTRUCTION: ICD-10-CM

## 2018-05-18 DIAGNOSIS — N40.1 BPH WITH URINARY OBSTRUCTION: ICD-10-CM

## 2018-05-18 DIAGNOSIS — Z79.4 TYPE 2 DIABETES MELLITUS WITH OTHER SKIN ULCER, WITH LONG-TERM CURRENT USE OF INSULIN (HCC): Primary | ICD-10-CM

## 2018-05-18 DIAGNOSIS — E11.622 TYPE 2 DIABETES MELLITUS WITH OTHER SKIN ULCER, WITH LONG-TERM CURRENT USE OF INSULIN (HCC): Primary | ICD-10-CM

## 2018-05-18 DIAGNOSIS — I25.10 CORONARY ARTERY DISEASE INVOLVING NATIVE HEART WITHOUT ANGINA PECTORIS, UNSPECIFIED VESSEL OR LESION TYPE: ICD-10-CM

## 2018-05-18 DIAGNOSIS — R58 ECCHYMOSIS: ICD-10-CM

## 2018-05-18 DIAGNOSIS — D50.8 IRON DEFICIENCY ANEMIA SECONDARY TO INADEQUATE DIETARY IRON INTAKE: ICD-10-CM

## 2018-05-18 DIAGNOSIS — I10 ESSENTIAL HYPERTENSION: ICD-10-CM

## 2018-05-18 PROCEDURE — 99214 OFFICE O/P EST MOD 30 MIN: CPT | Performed by: INTERNAL MEDICINE

## 2018-05-18 RX ORDER — TAMSULOSIN HYDROCHLORIDE 0.4 MG/1
0.4 CAPSULE ORAL
Qty: 90 CAPSULE | Refills: 1 | Status: SHIPPED | OUTPATIENT
Start: 2018-05-18 | End: 2019-01-31 | Stop reason: ALTCHOICE

## 2018-05-18 RX ORDER — FUROSEMIDE 20 MG/1
20 TABLET ORAL DAILY
Qty: 90 TABLET | Refills: 1 | Status: SHIPPED | OUTPATIENT
Start: 2018-05-18 | End: 2019-01-17 | Stop reason: SDUPTHER

## 2018-05-18 RX ORDER — ISOSORBIDE MONONITRATE 30 MG/1
30 TABLET, EXTENDED RELEASE ORAL DAILY
Qty: 90 TABLET | Refills: 1 | Status: SHIPPED | OUTPATIENT
Start: 2018-05-18 | End: 2019-02-23 | Stop reason: SDUPTHER

## 2018-05-18 NOTE — PATIENT INSTRUCTIONS
Ecchymosis   WHAT YOU NEED TO KNOW:   Ecchymosis is a collection of blood under the skin  Blood leaks from blood vessels and collects in nearby tissues  This can happen anywhere just below the skin, or in a mucus membrane, such as your mouth  Ecchymosis may appear as a large red, blue, or purple area of skin  You may also have pain or swelling in the area  Signs and symptoms may move to nearby body areas  It is important for you to follow up with your healthcare provider  Some causes of ecchymosis are serious and need medical treatment  DISCHARGE INSTRUCTIONS:   Contact your healthcare provider if:   · You have new symptoms  · Your bruise suddenly gets bigger and feels hard  · The affected area does not improve within 2 weeks  · You have ecchymosis around your eye and you are having trouble seeing  · You have questions or concerns about your condition or care  Self-care:   · Rest  the area to help the tissues heal      · Apply ice  to the area to relieve pain and swelling  Ice can also help prevent tissue damage  Use an ice pack, or put crushed ice in a bag  Cover the ice pack or bag with a small towel before you apply it to your skin  Apply ice for 20 minutes every hour, or as directed  · Elevate  the affected area to reduce swelling, and to improve circulation  Prop the area on pillows to keep it elevated above the level of your heart  Do this as often as possible  · NSAID medicines  such as ibuprofen can help reduce pain and swelling  NSAIDs are available without a prescription  Ask your healthcare provider which medicine is right for you  Ask how much to take and how often to take it  Follow directions  NSAIDs can cause stomach bleeding and kidney damage if not taken correctly  If you take blood thinner medicine, always ask if NSAIDs are safe for you  Follow up with your healthcare provider as directed:  Write down your questions so you remember to ask them during your visits    © 2017 [facility] Information is for End User's use only and may not be sold, redistributed or otherwise used for commercial purposes  All illustrations and images included in CareNotes® are the copyrighted property of [employer]  or [employer]  The above information is an  only  It is not intended as medical advice for individual conditions or treatments  Talk to your doctor, nurse or pharmacist before following any medical regimen to see if it is safe and effective for you  Will going to check a PT PTT for your ecchymosis continue baby aspirin    See your endocrinologist concerning your glucose control

## 2018-05-18 NOTE — ASSESSMENT & PLAN NOTE
Diabetes is poorly controlled hemoglobin A1c is 9 is going to see the endocrinologist soon to adjust the medication

## 2018-05-18 NOTE — PROGRESS NOTES
Assessment/Plan: will check a platelet count and PT PTT to make sure there is no coagulopathy in view of the ecchymosis am sure was trauma related continue baby aspirin for cardiovascular protection    Ecchymosis   Developed ecchymosis playing golf on the right arm  He is on aspirin platelet count is normal will repeat that again  Diabetes mellitus ([facility] [address] )    Diabetes is poorly controlled hemoglobin A1c is 9 is going to see the endocrinologist soon to adjust the medication  CAD (coronary artery disease)    Stable no angina    Hypertension   Blood pressure control on medication         Problem List Items Addressed This Visit     CAD (coronary artery disease)       Stable no angina         Relevant Orders    Comprehensive metabolic panel    CBC and differential    Gamma GT    Magnesium    Hypertension      Blood pressure control on medication         Relevant Orders    Comprehensive metabolic panel    CBC and differential    Gamma GT    Magnesium    Diabetes mellitus ([facility] [address] ) - Primary       Diabetes is poorly controlled hemoglobin A1c is 9 is going to see the endocrinologist soon to adjust the medication  Relevant Orders    Comprehensive metabolic panel    CBC and differential    Gamma GT    Magnesium    Ecchymosis      Developed ecchymosis playing golf on the right arm  He is on aspirin platelet count is normal will repeat that again  Relevant Orders    Comprehensive metabolic panel    CBC and differential    Gamma GT    Magnesium    APTT    Protime-INR            Subjective:      Patient ID: Chhaya Grande is a 74 y o  male  Chief Complaint   Patient presents with    Follow-up         Current Outpatient Prescriptions:     ascorbic Acid (VITAMIN C) 500 MG CPCR, Take 1 capsule (500 mg total) by mouth 2 (two) times a day, Disp: 60 capsule, Rfl: 1    aspirin 81 MG tablet, Take 81 mg by mouth daily  , Disp: , Rfl:     Azelastine HCl 0 15 % SOLN, Inhale 1 spray 2 (two) times a day, Disp: , Rfl: 0    bimatoprost (LUMIGAN) 0 01 % ophthalmic drops, 1 drop daily at bedtime  , Disp: , Rfl:     bismuth subsalicylate (PEPTO BISMOL) 262 MG chewable tablet, Chew 524 mg daily as needed for indigestion  , Disp: , Rfl:     Cholecalciferol (VITAMIN D3) 25968 units CAPS, TAKE 3 CAPSULES PER MONTH, Disp: 12 capsule, Rfl: 0    ergocalciferol (ERGOCALCIFEROL) 75881 units capsule, Take 50,000 Units by mouth, Disp: , Rfl:     escitalopram (LEXAPRO) 20 mg tablet, 1/d, Disp: , Rfl:     esomeprazole (NEXIUM) 20 mg capsule, 1/d, Disp: , Rfl:     ferrous sulfate 324 (65 Fe) mg, Take 1 tablet (324 mg total) by mouth 2 (two) times a day before meals, Disp: 60 tablet, Rfl: 1    fluocinonide (LIDEX) 0 05 % cream, Apply topically 2 (two) times a day , Disp: , Rfl:     fluticasone (FLONASE) 50 mcg/act nasal spray, 1 spray into each nostril daily, Disp: 16 g, Rfl: 0    fluticasone (VERAMYST) 27 5 MCG/SPRAY nasal spray, 2 sprays into each nostril daily  , Disp: , Rfl:     folic acid (FOLVITE) 1 mg tablet, Take 1 tablet (1 mg total) by mouth daily, Disp: 30 tablet, Rfl: 1    furosemide (LASIX) 20 mg tablet, Take 20 mg by mouth 2 (two) times a day , Disp: , Rfl:     gabapentin (NEURONTIN) 300 mg capsule, TAKE 2 CAPSULES AT BEDTIME NIGHTLY , Disp: 180 capsule, Rfl: 1    HYDROcodone-acetaminophen (NORCO) 5-325 mg per tablet, Take 5-325 mg by mouth, Disp: , Rfl:     insulin aspart (NovoLOG) 100 units/mL injection, Inject 15 Units under the skin 3 (three) times a day before meals  , Disp: , Rfl:     Insulin Glargine (LANTUS SOLOSTAR) 100 UNIT/ML SOPN, Inject 50 Units under the skin Medrol Dose Pack scheduling ONLY  , Disp: , Rfl:     isosorbide mononitrate (IMDUR) 30 mg 24 hr tablet, , Disp: , Rfl:     loperamide (IMODIUM) 2 mg capsule, Take by mouth, Disp: , Rfl:     metFORMIN (GLUCOPHAGE) 1000 MG tablet, Take 1,000 mg by mouth 2 (two) times a day with meals  , Disp: , Rfl:     metoprolol succinate (TOPROL-XL) 100 mg 24 hr tablet, Take 100 mg by mouth daily  , Disp: , Rfl:     Mirabegron ER 50 MG TB24, Take 50 mg by mouth, Disp: , Rfl:     mirtazapine (REMERON) 15 mg tablet, , Disp: , Rfl:     multivitamin (THERAGRAN) TABS, Take 1 tablet by mouth daily  , Disp: , Rfl:     mupirocin (BACTROBAN) 2 % nasal ointment, , Disp: , Rfl:     NOVOLOG FLEXPEN 100 UNIT/ML SOPN, , Disp: , Rfl:     omeprazole (PriLOSEC) 20 mg delayed release capsule, Take 20 mg by mouth daily  , Disp: , Rfl:     traMADol (ULTRAM) 50 mg tablet, Take 50 mg by mouth every 6 (six) hours, Disp: , Rfl:     benzonatate (TESSALON PERLES) 100 mg capsule, Take 1 capsule (100 mg total) by mouth 3 (three) times a day as needed for cough, Disp: 20 capsule, Rfl: 0    insulin lispro (HUMALOG KWIKPEN) 100 Units/mL SOPN,  AD, Disp: , Rfl:     insulin lispro protamine-insulin lispro (HUMALOG MIX 75/25) 100 units/mL, as directed, Disp: , Rfl:      Problem 1  Ecchymosis and ecchymosis in the right arm  Happen play golf unsure he had a trauma or had muscle break  The gave him ecchymosis of it should resolve with time he is able to flex extend his arm without any problem he has no pain he is on aspirin for cardiovascular protection  Problem 2  Coronary artery disease stable previous coronary artery bypass surgery no chest pain palpitation shortness of breath  Problem 3  Diabetes poorly controlled hemoglobin A1c is 9 he will see the endocrinologist next week for adjustment of the medications  Denies polyuria polydipsia    Problem 4  Had a URI which totally resolved he has some allergist which she is taking Flonase nasal spray which is helping  Denies any cough congestion        No evidence of heart failure        The following portions of the patient's history were reviewed and updated as appropriate: allergies, current medications, past family history, past medical history, past social history, past surgical history and problem list     Review of Systems   Constitutional: Negative  Negative for activity change, appetite change, fatigue, fever and unexpected weight change  HENT: Negative for congestion, ear pain, hearing loss, mouth sores, postnasal drip, rhinorrhea, sore throat, trouble swallowing and voice change  Eyes: Negative for pain, redness and visual disturbance  Respiratory: Negative for cough, chest tightness, shortness of breath and wheezing  Cardiovascular: Negative for chest pain, palpitations and leg swelling  Gastrointestinal: Negative for abdominal distention, abdominal pain, blood in stool, constipation, diarrhea and nausea  Endocrine: Negative for cold intolerance, heat intolerance, polydipsia, polyphagia and polyuria  Genitourinary: Negative for difficulty urinating, dysuria, flank pain, frequency, hematuria and urgency  Musculoskeletal: Negative for arthralgias, back pain, gait problem, joint swelling and myalgias  Skin: Negative for color change and pallor  Neurological: Negative for dizziness, tremors, seizures, syncope, weakness, numbness and headaches  Hematological: Negative for adenopathy  Does not bruise/bleed easily  Psychiatric/Behavioral: Negative  Negative for sleep disturbance  The patient is not nervous/anxious  Objective:    Results for orders placed or performed in visit on 05/18/18   Lipid panel   Result Value Ref Range    Triglycerides 230 (A) 150 mg/dL    Cholesterol 137 50 - 200 mg/dL    HDL, Direct 33 (A) 40 - 60 mg/dL    LDL Cholesterol 59 3    HEMOGLOBIN A1C W/ EAG ESTIMATION   Result Value Ref Range    Hemoglobin A1C 9 2 (A) 4 2 - 6 3 %       /83 (BP Location: Left arm, Patient Position: Sitting, Cuff Size: Large)   Pulse 76   Temp (!) 97 1 °F (36 2 °C)   Resp 15   Ht 6' 4" (1 93 m)   Wt 115 kg (254 lb)   BMI 30 92 kg/m²      Physical Exam   Constitutional: He is oriented to person, place, and time  He appears well-developed and well-nourished  HENT:   Head: Normocephalic     Right Ear: External ear normal    Left Ear: External ear normal    Nose: Nose normal    Mouth/Throat: Oropharynx is clear and moist  No oropharyngeal exudate  Eyes: Conjunctivae and EOM are normal  Pupils are equal, round, and reactive to light  Neck: Normal range of motion  Neck supple  No thyromegaly present  Cardiovascular: Normal rate, regular rhythm, normal heart sounds and intact distal pulses  Exam reveals no gallop and no friction rub  No murmur heard  S1-S2 no gallops or murmur no dependent edema in the lower extremities   Pulmonary/Chest: Effort normal and breath sounds normal  No respiratory distress  He has no wheezes  He has no rales  Abdominal: Soft  Bowel sounds are normal  He exhibits no distension and no mass  There is no tenderness  There is no rebound and no guarding  Musculoskeletal: Normal range of motion  Lymphadenopathy:     He has no cervical adenopathy  Neurological: He is alert and oriented to person, place, and time  Skin: Skin is warm and dry  Ecchymosis around the elbow area medially in the right arm occur after playing golf no pain no evidence of infection able to flex extend the arm without any problems   Psychiatric: He has a normal mood and affect  His behavior is normal  Judgment normal    Nursing note and vitals reviewed

## 2018-05-18 NOTE — ASSESSMENT & PLAN NOTE
Developed ecchymosis playing golf on the right arm  He is on aspirin platelet count is normal will repeat that again

## 2018-07-15 DIAGNOSIS — Z79.4 TYPE 2 DIABETES MELLITUS WITH COMPLICATION, WITH LONG-TERM CURRENT USE OF INSULIN (HCC): Primary | ICD-10-CM

## 2018-07-15 DIAGNOSIS — K21.9 GASTROESOPHAGEAL REFLUX DISEASE WITHOUT ESOPHAGITIS: ICD-10-CM

## 2018-07-15 DIAGNOSIS — E11.8 TYPE 2 DIABETES MELLITUS WITH COMPLICATION, WITH LONG-TERM CURRENT USE OF INSULIN (HCC): Primary | ICD-10-CM

## 2018-07-15 RX ORDER — OMEPRAZOLE 20 MG/1
CAPSULE, DELAYED RELEASE ORAL
Qty: 90 CAPSULE | Refills: 1 | Status: SHIPPED | OUTPATIENT
Start: 2018-07-15 | End: 2019-04-16 | Stop reason: ALTCHOICE

## 2018-07-15 RX ORDER — INSULIN GLARGINE 100 [IU]/ML
INJECTION, SOLUTION SUBCUTANEOUS
Qty: 45 ML | Refills: 1 | Status: SHIPPED | OUTPATIENT
Start: 2018-07-15 | End: 2018-09-20 | Stop reason: SDUPTHER

## 2018-07-17 ENCOUNTER — CONSULT (OUTPATIENT)
Dept: INTERNAL MEDICINE CLINIC | Facility: CLINIC | Age: 73
End: 2018-07-17
Payer: MEDICARE

## 2018-07-17 VITALS
DIASTOLIC BLOOD PRESSURE: 83 MMHG | BODY MASS INDEX: 30.32 KG/M2 | TEMPERATURE: 97.5 F | SYSTOLIC BLOOD PRESSURE: 128 MMHG | HEIGHT: 76 IN | WEIGHT: 249 LBS | RESPIRATION RATE: 15 BRPM | HEART RATE: 81 BPM

## 2018-07-17 DIAGNOSIS — E11.622 TYPE 2 DIABETES MELLITUS WITH OTHER SKIN ULCER, WITH LONG-TERM CURRENT USE OF INSULIN (HCC): Primary | ICD-10-CM

## 2018-07-17 DIAGNOSIS — I10 ESSENTIAL HYPERTENSION: ICD-10-CM

## 2018-07-17 DIAGNOSIS — I25.10 CORONARY ARTERY DISEASE INVOLVING NATIVE HEART WITHOUT ANGINA PECTORIS, UNSPECIFIED VESSEL OR LESION TYPE: ICD-10-CM

## 2018-07-17 DIAGNOSIS — M17.12 PRIMARY OSTEOARTHRITIS OF LEFT KNEE: ICD-10-CM

## 2018-07-17 DIAGNOSIS — J44.9 CHRONIC OBSTRUCTIVE PULMONARY DISEASE, UNSPECIFIED COPD TYPE (HCC): ICD-10-CM

## 2018-07-17 DIAGNOSIS — Z79.4 TYPE 2 DIABETES MELLITUS WITH OTHER SKIN ULCER, WITH LONG-TERM CURRENT USE OF INSULIN (HCC): Primary | ICD-10-CM

## 2018-07-17 PROBLEM — Z01.818 PREOP EXAMINATION: Status: ACTIVE | Noted: 2018-07-17

## 2018-07-17 PROCEDURE — 99214 OFFICE O/P EST MOD 30 MIN: CPT | Performed by: INTERNAL MEDICINE

## 2018-07-17 NOTE — PROGRESS NOTES
Assessment/Plan:    Preop examination   Will be undergoing a left total knee replacement at [facility]  Had a fall and injury and had increasing knee pain since that time affecting his ADLs and quality of life he saw the Cardiology was cleared by Cardiology already the day of surgery we told him to take the following medications  Aspirin   Lexapro   Nexium  Metoprolol succinate 100 mg   Imdur 30 mg     Do not take any diabetic medication the day of surgery  No insulin no metformin        Diabetes mellitus ([address] )  Lab Results   Component Value Date    HGBA1C 9 2 (A) 04/20/2018       No results for input(s): POCGLU in the last 72 hours  poorly controlled he said he is doing better that will affect the results of the surgery poor diabetic control puts you at risk for infections  Blood Sugar Average: Last 72 hrs:      Chronic obstructive pulmonary disease ([address] )    At the present time in remission no coughing phlegm or shortness of breath    CAD (coronary artery disease)   So the cardiologist recently has no angina blood pressure is stable he looks good  Hypertension    Blood pressure is well control    Primary osteoarthritis of left knee   Medically clear for total knee replacement of the left knee  Problem List Items Addressed This Visit     CAD (coronary artery disease)      So the cardiologist recently has no angina blood pressure is stable he looks good  Chronic obstructive pulmonary disease (HCC)       At the present time in remission no coughing phlegm or shortness of breath         Hypertension       Blood pressure is well control         Diabetes mellitus ([address] ) - Primary     Lab Results   Component Value Date    HGBA1C 9 2 (A) 04/20/2018       No results for input(s): POCGLU in the last 72 hours  poorly controlled he said he is doing better that will affect the results of the surgery poor diabetic control puts you at risk for infections      Blood Sugar Average: Last 72 hrs:           Primary osteoarthritis of left knee      Medically clear for total knee replacement of the left knee  Subjective:      Patient ID: Alecia Moran is a 74 y o  male  Chief Complaint   Patient presents with    Pre-op Exam     left TKA 8 2 2018, Dr Ankita Goldstein @ [facility]         Current Outpatient Prescriptions:     ascorbic Acid (VITAMIN C) 500 MG CPCR, Take 1 capsule (500 mg total) by mouth 2 (two) times a day, Disp: 60 capsule, Rfl: 1    aspirin 81 MG tablet, Take 81 mg by mouth daily  , Disp: , Rfl:     Azelastine HCl 0 15 % SOLN, Inhale 1 spray 2 (two) times a day, Disp: , Rfl: 0    benzonatate (TESSALON PERLES) 100 mg capsule, Take 1 capsule (100 mg total) by mouth 3 (three) times a day as needed for cough, Disp: 20 capsule, Rfl: 0    bimatoprost (LUMIGAN) 0 01 % ophthalmic drops, 1 drop daily at bedtime  , Disp: , Rfl:     bismuth subsalicylate (PEPTO BISMOL) 262 MG chewable tablet, Chew 524 mg daily as needed for indigestion  , Disp: , Rfl:     Cholecalciferol (VITAMIN D3) 97394 units CAPS, TAKE 3 CAPSULES PER MONTH, Disp: 12 capsule, Rfl: 0    ergocalciferol (ERGOCALCIFEROL) 87312 units capsule, Take 50,000 Units by mouth, Disp: , Rfl:     escitalopram (LEXAPRO) 20 mg tablet, 1/d, Disp: , Rfl:     esomeprazole (NEXIUM) 20 mg capsule, 1/d, Disp: , Rfl:     fluocinonide (LIDEX) 0 05 % cream, Apply topically 2 (two) times a day , Disp: , Rfl:     fluticasone (FLONASE) 50 mcg/act nasal spray, 1 spray into each nostril daily, Disp: 16 g, Rfl: 0    fluticasone (VERAMYST) 27 5 MCG/SPRAY nasal spray, 2 sprays into each nostril daily  , Disp: , Rfl:     folic acid (FOLVITE) 1 mg tablet, Take 1 tablet (1 mg total) by mouth daily, Disp: 30 tablet, Rfl: 1    furosemide (LASIX) 20 mg tablet, Take 1 tablet (20 mg total) by mouth daily for 90 days Take 1 tablet daily, Disp: 90 tablet, Rfl: 1    gabapentin (NEURONTIN) 300 mg capsule, TAKE 2 CAPSULES AT BEDTIME NIGHTLY , Disp: 180 capsule, Rfl: 1    HYDROcodone-acetaminophen (NORCO) 5-325 mg per tablet, Take 5-325 mg by mouth, Disp: , Rfl:     insulin aspart (NovoLOG) 100 units/mL injection, Inject 15 Units under the skin 3 (three) times a day before meals  , Disp: , Rfl:     insulin lispro (HUMALOG KWIKPEN) 100 Units/mL SOPN,  AD, Disp: , Rfl:     insulin lispro protamine-insulin lispro (HUMALOG MIX 75/25) 100 units/mL, as directed, Disp: , Rfl:     isosorbide mononitrate (IMDUR) 30 mg 24 hr tablet, Take 1 tablet (30 mg total) by mouth daily for 90 days, Disp: 90 tablet, Rfl: 1    LANTUS SOLOSTAR 100 units/mL injection pen, INJECT 50 UNITS            SUBCUTANEOUSLY AT BEDTIME, Disp: 45 mL, Rfl: 1    loperamide (IMODIUM) 2 mg capsule, Take by mouth, Disp: , Rfl:     metFORMIN (GLUCOPHAGE) 1000 MG tablet, Take 1,000 mg by mouth 2 (two) times a day with meals  , Disp: , Rfl:     metoprolol succinate (TOPROL-XL) 100 mg 24 hr tablet, Take 100 mg by mouth daily  , Disp: , Rfl:     Mirabegron ER 50 MG TB24, Take 50 mg by mouth, Disp: , Rfl:     mirtazapine (REMERON) 15 mg tablet, , Disp: , Rfl:     multivitamin (THERAGRAN) TABS, Take 1 tablet by mouth daily  , Disp: , Rfl:     mupirocin (BACTROBAN) 2 % nasal ointment, , Disp: , Rfl:     NOVOLOG FLEXPEN 100 UNIT/ML SOPN, , Disp: , Rfl:     omeprazole (PriLOSEC) 20 mg delayed release capsule, TAKE 1 CAPSULE DAILY, Disp: 90 capsule, Rfl: 1    tamsulosin (FLOMAX) 0 4 mg, Take 1 capsule (0 4 mg total) by mouth daily with dinner for 90 days, Disp: 90 capsule, Rfl: 1    traMADol (ULTRAM) 50 mg tablet, Take 50 mg by mouth every 6 (six) hours, Disp: , Rfl:       Functional capacity is poor because of his knee pain on able to walk 4 blocks or do 2 flights of stairs he recently so cardiologist and cleared him for surgery he has had no angina  Diabetes poorly controlled with him again a moving see of 9 he is at his sugars are better that will impinge of the risk of infection postop  The following portions of the patient's history were reviewed and updated as appropriate: allergies, current medications, past family history, past medical history, past social history, past surgical history and problem list     Review of Systems   Constitutional: Negative  Negative for activity change, appetite change, fatigue, fever and unexpected weight change  HENT: Negative for congestion, ear pain, hearing loss, mouth sores, postnasal drip, rhinorrhea, sore throat, trouble swallowing and voice change  Eyes: Negative for pain, redness and visual disturbance  Respiratory: Negative for cough, chest tightness, shortness of breath and wheezing  Cardiovascular: Negative for chest pain, palpitations and leg swelling  Gastrointestinal: Negative for abdominal distention, abdominal pain, blood in stool, constipation, diarrhea and nausea  Endocrine: Negative for cold intolerance, heat intolerance, polydipsia, polyphagia and polyuria  Genitourinary: Negative for difficulty urinating, dysuria, flank pain, frequency, hematuria and urgency  Musculoskeletal: Negative for arthralgias, back pain, gait problem, joint swelling and myalgias  Skin: Negative for color change and pallor  Neurological: Negative for dizziness, tremors, seizures, syncope, weakness, numbness and headaches  Hematological: Negative for adenopathy  Does not bruise/bleed easily  Psychiatric/Behavioral: Negative  Negative for sleep disturbance  The patient is not nervous/anxious            Objective:    Results for orders placed or performed in visit on 05/18/18   Lipid panel   Result Value Ref Range    Triglycerides 230 (A) 150 mg/dL    Cholesterol 137 50 - 200 mg/dL    HDL, Direct 33 (A) 40 - 60 mg/dL    LDL Cholesterol 59 3    HEMOGLOBIN A1C W/ EAG ESTIMATION   Result Value Ref Range    Hemoglobin A1C 9 2 (A) 4 2 - 6 3 %       /83 (BP Location: Left arm, Patient Position: Sitting, Cuff Size: Large)   Pulse 81 Temp 97 5 °F (36 4 °C)   Resp 15   Ht 6' 4" (1 93 m)   Wt 113 kg (249 lb)   BMI 30 31 kg/m²      Physical Exam   Constitutional: He is oriented to person, place, and time  He appears well-developed and well-nourished  HENT:   Head: Normocephalic  Right Ear: External ear normal    Left Ear: External ear normal    Nose: Nose normal    Mouth/Throat: Oropharynx is clear and moist  No oropharyngeal exudate  Eyes: Conjunctivae and EOM are normal  Pupils are equal, round, and reactive to light  Neck: Normal range of motion  Neck supple  No thyromegaly present  Cardiovascular: Normal rate, regular rhythm, normal heart sounds and intact distal pulses  Exam reveals no gallop and no friction rub  No murmur heard  S1-S2 no gallops  Extremities no edema    Rhythm is regular   Pulmonary/Chest: Effort normal and breath sounds normal  No respiratory distress  He has no wheezes  He has no rales  Lungs are clear no wheezing rales or rhonchi   Abdominal: Soft  Bowel sounds are normal  He exhibits no distension and no mass  There is no tenderness  There is no rebound and no guarding  Abdomen soft nontender   Musculoskeletal: Normal range of motion  He exhibits no edema or tenderness  No calf tenderness   Lymphadenopathy:     He has no cervical adenopathy  Neurological: He is alert and oriented to person, place, and time  Skin: Skin is warm and dry  Psychiatric: He has a normal mood and affect  His behavior is normal  Judgment normal    Nursing note and vitals reviewed

## 2018-07-17 NOTE — ASSESSMENT & PLAN NOTE
Lab Results   Component Value Date    HGBA1C 9 2 (A) 04/20/2018       No results for input(s): POCGLU in the last 72 hours  poorly controlled he said he is doing better that will affect the results of the surgery poor diabetic control puts you at risk for infections      Blood Sugar Average: Last 72 hrs:

## 2018-07-17 NOTE — PATIENT INSTRUCTIONS
The day of surgery no diabetic medication    Aspirin  Lexapro  Nexium  Indur  30 mg   Metoprolol succinate 100 mg

## 2018-07-24 ENCOUNTER — TELEPHONE (OUTPATIENT)
Dept: ENDOCRINOLOGY | Facility: HOSPITAL | Age: 73
End: 2018-07-24

## 2018-07-27 DIAGNOSIS — E11.8 TYPE 2 DIABETES MELLITUS WITH COMPLICATION, UNSPECIFIED WHETHER LONG TERM INSULIN USE: Primary | ICD-10-CM

## 2018-08-24 DIAGNOSIS — R79.89 ELEVATED SERUM CREATININE: Primary | ICD-10-CM

## 2018-08-24 DIAGNOSIS — E11.8 TYPE 2 DIABETES MELLITUS WITH COMPLICATION, WITHOUT LONG-TERM CURRENT USE OF INSULIN (HCC): ICD-10-CM

## 2018-08-24 NOTE — PROGRESS NOTES
Patient called, reviewed BMP, creatinine 1 70   Instructed not to take NSAIDs and repeat BMP in 5-7 days

## 2018-08-28 ENCOUNTER — APPOINTMENT (OUTPATIENT)
Dept: LAB | Facility: CLINIC | Age: 73
End: 2018-08-28
Payer: MEDICARE

## 2018-08-28 DIAGNOSIS — R79.89 ELEVATED SERUM CREATININE: ICD-10-CM

## 2018-08-28 LAB
ANION GAP SERPL CALCULATED.3IONS-SCNC: 8 MMOL/L (ref 4–13)
BUN SERPL-MCNC: 26 MG/DL (ref 5–25)
CALCIUM SERPL-MCNC: 9.3 MG/DL (ref 8.3–10.1)
CHLORIDE SERPL-SCNC: 101 MMOL/L (ref 100–108)
CO2 SERPL-SCNC: 28 MMOL/L (ref 21–32)
CREAT SERPL-MCNC: 1.59 MG/DL (ref 0.6–1.3)
CREAT UR-MCNC: 199 MG/DL
GFR SERPL CREATININE-BSD FRML MDRD: 48 ML/MIN/1.73SQ M
GLUCOSE SERPL-MCNC: 181 MG/DL (ref 65–140)
MICROALBUMIN UR-MCNC: 338 MG/L (ref 0–20)
MICROALBUMIN/CREAT 24H UR: 170 MG/G CREATININE (ref 0–30)
POTASSIUM SERPL-SCNC: 4.3 MMOL/L (ref 3.5–5.3)
SODIUM SERPL-SCNC: 137 MMOL/L (ref 136–145)

## 2018-08-28 PROCEDURE — 82043 UR ALBUMIN QUANTITATIVE: CPT

## 2018-08-28 PROCEDURE — 36415 COLL VENOUS BLD VENIPUNCTURE: CPT

## 2018-08-28 PROCEDURE — 82570 ASSAY OF URINE CREATININE: CPT

## 2018-08-28 PROCEDURE — 80048 BASIC METABOLIC PNL TOTAL CA: CPT

## 2018-09-19 ENCOUNTER — APPOINTMENT (OUTPATIENT)
Dept: LAB | Facility: CLINIC | Age: 73
End: 2018-09-19
Payer: MEDICARE

## 2018-09-19 DIAGNOSIS — R58 ECCHYMOSIS: ICD-10-CM

## 2018-09-19 DIAGNOSIS — I25.10 CORONARY ARTERY DISEASE INVOLVING NATIVE HEART WITHOUT ANGINA PECTORIS, UNSPECIFIED VESSEL OR LESION TYPE: ICD-10-CM

## 2018-09-19 DIAGNOSIS — Z79.4 TYPE 2 DIABETES MELLITUS WITH OTHER SKIN ULCER, WITH LONG-TERM CURRENT USE OF INSULIN (HCC): ICD-10-CM

## 2018-09-19 DIAGNOSIS — E11.622 TYPE 2 DIABETES MELLITUS WITH OTHER SKIN ULCER, WITH LONG-TERM CURRENT USE OF INSULIN (HCC): ICD-10-CM

## 2018-09-19 DIAGNOSIS — I10 ESSENTIAL HYPERTENSION: ICD-10-CM

## 2018-09-19 DIAGNOSIS — D50.8 IRON DEFICIENCY ANEMIA SECONDARY TO INADEQUATE DIETARY IRON INTAKE: ICD-10-CM

## 2018-09-19 LAB
ALBUMIN SERPL BCP-MCNC: 3.6 G/DL (ref 3.5–5)
ALP SERPL-CCNC: 90 U/L (ref 46–116)
ALT SERPL W P-5'-P-CCNC: 47 U/L (ref 12–78)
ANION GAP SERPL CALCULATED.3IONS-SCNC: 7 MMOL/L (ref 4–13)
APTT PPP: 30 SECONDS (ref 24–36)
AST SERPL W P-5'-P-CCNC: 28 U/L (ref 5–45)
BASOPHILS # BLD AUTO: 0.03 THOUSANDS/ΜL (ref 0–0.1)
BASOPHILS NFR BLD AUTO: 0 % (ref 0–1)
BILIRUB SERPL-MCNC: 0.53 MG/DL (ref 0.2–1)
BUN SERPL-MCNC: 20 MG/DL (ref 5–25)
CALCIUM SERPL-MCNC: 8.9 MG/DL (ref 8.3–10.1)
CHLORIDE SERPL-SCNC: 105 MMOL/L (ref 100–108)
CO2 SERPL-SCNC: 27 MMOL/L (ref 21–32)
CREAT SERPL-MCNC: 1.57 MG/DL (ref 0.6–1.3)
EOSINOPHIL # BLD AUTO: 0.25 THOUSAND/ΜL (ref 0–0.61)
EOSINOPHIL NFR BLD AUTO: 3 % (ref 0–6)
ERYTHROCYTE [DISTWIDTH] IN BLOOD BY AUTOMATED COUNT: 13 % (ref 11.6–15.1)
FERRITIN SERPL-MCNC: 73 NG/ML (ref 8–388)
GFR SERPL CREATININE-BSD FRML MDRD: 49 ML/MIN/1.73SQ M
GGT SERPL-CCNC: 32 U/L (ref 5–85)
GLUCOSE P FAST SERPL-MCNC: 106 MG/DL (ref 65–99)
HCT VFR BLD AUTO: 37 % (ref 36.5–49.3)
HGB BLD-MCNC: 12.4 G/DL (ref 12–17)
IMM GRANULOCYTES # BLD AUTO: 0.02 THOUSAND/UL (ref 0–0.2)
IMM GRANULOCYTES NFR BLD AUTO: 0 % (ref 0–2)
INR PPP: 0.98 (ref 0.86–1.17)
LYMPHOCYTES # BLD AUTO: 2.65 THOUSANDS/ΜL (ref 0.6–4.47)
LYMPHOCYTES NFR BLD AUTO: 33 % (ref 14–44)
MAGNESIUM SERPL-MCNC: 2.1 MG/DL (ref 1.6–2.6)
MCH RBC QN AUTO: 31.3 PG (ref 26.8–34.3)
MCHC RBC AUTO-ENTMCNC: 33.5 G/DL (ref 31.4–37.4)
MCV RBC AUTO: 93 FL (ref 82–98)
MONOCYTES # BLD AUTO: 0.56 THOUSAND/ΜL (ref 0.17–1.22)
MONOCYTES NFR BLD AUTO: 7 % (ref 4–12)
NEUTROPHILS # BLD AUTO: 4.47 THOUSANDS/ΜL (ref 1.85–7.62)
NEUTS SEG NFR BLD AUTO: 57 % (ref 43–75)
NRBC BLD AUTO-RTO: 0 /100 WBCS
PLATELET # BLD AUTO: 175 THOUSANDS/UL (ref 149–390)
PMV BLD AUTO: 10.7 FL (ref 8.9–12.7)
POTASSIUM SERPL-SCNC: 4.5 MMOL/L (ref 3.5–5.3)
PROT SERPL-MCNC: 7.5 G/DL (ref 6.4–8.2)
PROTHROMBIN TIME: 13.1 SECONDS (ref 11.8–14.2)
RBC # BLD AUTO: 3.96 MILLION/UL (ref 3.88–5.62)
SODIUM SERPL-SCNC: 139 MMOL/L (ref 136–145)
WBC # BLD AUTO: 7.98 THOUSAND/UL (ref 4.31–10.16)

## 2018-09-19 PROCEDURE — 80053 COMPREHEN METABOLIC PANEL: CPT

## 2018-09-19 PROCEDURE — 85610 PROTHROMBIN TIME: CPT

## 2018-09-19 PROCEDURE — 82977 ASSAY OF GGT: CPT

## 2018-09-19 PROCEDURE — 36415 COLL VENOUS BLD VENIPUNCTURE: CPT

## 2018-09-19 PROCEDURE — 83735 ASSAY OF MAGNESIUM: CPT

## 2018-09-19 PROCEDURE — 82728 ASSAY OF FERRITIN: CPT

## 2018-09-19 PROCEDURE — 85025 COMPLETE CBC W/AUTO DIFF WBC: CPT

## 2018-09-19 PROCEDURE — 85730 THROMBOPLASTIN TIME PARTIAL: CPT

## 2018-09-20 ENCOUNTER — OFFICE VISIT (OUTPATIENT)
Dept: INTERNAL MEDICINE CLINIC | Facility: CLINIC | Age: 73
End: 2018-09-20
Payer: MEDICARE

## 2018-09-20 VITALS
TEMPERATURE: 97.3 F | RESPIRATION RATE: 15 BRPM | DIASTOLIC BLOOD PRESSURE: 80 MMHG | WEIGHT: 243 LBS | HEART RATE: 80 BPM | SYSTOLIC BLOOD PRESSURE: 120 MMHG | BODY MASS INDEX: 29.59 KG/M2 | HEIGHT: 76 IN

## 2018-09-20 DIAGNOSIS — I25.10 CORONARY ARTERY DISEASE INVOLVING NATIVE HEART WITHOUT ANGINA PECTORIS, UNSPECIFIED VESSEL OR LESION TYPE: ICD-10-CM

## 2018-09-20 DIAGNOSIS — J30.9 ALLERGIC RHINITIS, UNSPECIFIED SEASONALITY, UNSPECIFIED TRIGGER: ICD-10-CM

## 2018-09-20 DIAGNOSIS — J44.9 CHRONIC OBSTRUCTIVE PULMONARY DISEASE, UNSPECIFIED COPD TYPE (HCC): ICD-10-CM

## 2018-09-20 DIAGNOSIS — E11.8 TYPE 2 DIABETES MELLITUS WITH COMPLICATION, WITH LONG-TERM CURRENT USE OF INSULIN (HCC): ICD-10-CM

## 2018-09-20 DIAGNOSIS — M25.062 KNEE HEMARTHROSIS, LEFT: ICD-10-CM

## 2018-09-20 DIAGNOSIS — E11.622 TYPE 2 DIABETES MELLITUS WITH OTHER SKIN ULCER, WITH LONG-TERM CURRENT USE OF INSULIN (HCC): Primary | ICD-10-CM

## 2018-09-20 DIAGNOSIS — Z79.4 TYPE 2 DIABETES MELLITUS WITH COMPLICATION, WITH LONG-TERM CURRENT USE OF INSULIN (HCC): ICD-10-CM

## 2018-09-20 DIAGNOSIS — Z79.4 TYPE 2 DIABETES MELLITUS WITH OTHER SKIN ULCER, WITH LONG-TERM CURRENT USE OF INSULIN (HCC): Primary | ICD-10-CM

## 2018-09-20 DIAGNOSIS — I10 ESSENTIAL HYPERTENSION: ICD-10-CM

## 2018-09-20 DIAGNOSIS — J06.9 UPPER RESPIRATORY TRACT INFECTION, UNSPECIFIED TYPE: ICD-10-CM

## 2018-09-20 LAB — SL AMB POCT HEMOGLOBIN AIC: 7.6

## 2018-09-20 PROCEDURE — 83036 HEMOGLOBIN GLYCOSYLATED A1C: CPT | Performed by: INTERNAL MEDICINE

## 2018-09-20 PROCEDURE — 99214 OFFICE O/P EST MOD 30 MIN: CPT | Performed by: INTERNAL MEDICINE

## 2018-09-20 RX ORDER — FLUTICASONE PROPIONATE 50 MCG
1 SPRAY, SUSPENSION (ML) NASAL DAILY
Qty: 16 G | Refills: 3 | Status: SHIPPED | OUTPATIENT
Start: 2018-09-20 | End: 2019-06-27 | Stop reason: SDUPTHER

## 2018-09-20 NOTE — ASSESSMENT & PLAN NOTE
Lab Results   Component Value Date    HGBA1C 9 2 (A) 04/20/2018       No results for input(s): POCGLU in the last 72 hours   did bring the blood sugar records does have endocrinology follow-up we need to find out what type of insulin he take pre meals he also has Lantus a day cut down  He lost some weight after the surgery no episodes of severe hypoglycemia    Blood Sugar Average: Last 72 hrs:

## 2018-09-20 NOTE — PROGRESS NOTES
Assessment/Plan:    Diabetes mellitus ([place] [id number] )  Lab Results   Component Value Date    HGBA1C 9 2 (A) 04/20/2018       No results for input(s): POCGLU in the last 72 hours  did bring the blood sugar records does have endocrinology follow-up we need to find out what type of insulin he take pre meals he also has Lantus a day cut down  He lost some weight after the surgery no episodes of severe hypoglycemia    Blood Sugar Average: Last 72 hrs:      Chronic obstructive pulmonary disease (HCC)   No coughing chest pain or shortness of breath    CAD (coronary artery disease)   No angina stable no evidence of heart failure    Hypertension   Blood pressure is well control will get 120/80  Knee hemarthrosis, left    She has had a total knee replacement on the left leg recuperating ambulating with a cane he looks good otherwise no swelling or calf tenderness       Diagnoses and all orders for this visit:    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, with long-term current use of insulin ([facility])  -     CBC and differential; Future  -     POCT hemoglobin A1c  -     Magnesium; Future  -     Basic metabolic panel; Future    Chronic obstructive pulmonary disease, unspecified COPD type ([place] [address] )    Coronary artery disease involving native heart without angina pectoris, unspecified vessel or lesion type  -     CBC and differential; Future  -     POCT hemoglobin A1c  -     Magnesium; Future  -     Basic metabolic panel; Future    Essential hypertension  -     CBC and differential; Future  -     POCT hemoglobin A1c  -     Magnesium; Future  -     Basic metabolic panel; Future    Knee hemarthrosis, left  -     CBC and differential; Future  -     POCT hemoglobin A1c  -     Magnesium; Future  -     Basic metabolic panel;  Future    Allergic rhinitis, unspecified seasonality, unspecified trigger  -     fluticasone (VERAMYST) 27 5 MCG/SPRAY nasal spray; 2 sprays into each nostril daily          Subjective:      Patient ID: Erich White juan jimenez 74 y o  male  Chief Complaint   Patient presents with    Follow-up     Discuss flu vaccine  Current Outpatient Prescriptions:     ascorbic Acid (VITAMIN C) 500 MG CPCR, Take 1 capsule (500 mg total) by mouth 2 (two) times a day, Disp: 60 capsule, Rfl: 1    aspirin 81 MG tablet, Take 81 mg by mouth daily  , Disp: , Rfl:     Azelastine HCl 0 15 % SOLN, Inhale 1 spray 2 (two) times a day, Disp: , Rfl: 0    bimatoprost (LUMIGAN) 0 01 % ophthalmic drops, 1 drop daily at bedtime  , Disp: , Rfl:     bismuth subsalicylate (PEPTO BISMOL) 262 MG chewable tablet, Chew 524 mg daily as needed for indigestion  , Disp: , Rfl:     Cholecalciferol (VITAMIN D3) 14093 units CAPS, TAKE 3 CAPSULES PER MONTH, Disp: 12 capsule, Rfl: 0    escitalopram (LEXAPRO) 20 mg tablet, 1/d, Disp: , Rfl:     esomeprazole (NEXIUM) 20 mg capsule, 1/d, Disp: , Rfl:     fluocinonide (LIDEX) 0 05 % cream, Apply topically 2 (two) times a day , Disp: , Rfl:     fluticasone (FLONASE) 50 mcg/act nasal spray, 1 spray into each nostril daily, Disp: 16 g, Rfl: 0    folic acid (FOLVITE) 1 mg tablet, Take 1 tablet (1 mg total) by mouth daily, Disp: 30 tablet, Rfl: 1    gabapentin (NEURONTIN) 300 mg capsule, TAKE 2 CAPSULES AT BEDTIME NIGHTLY , Disp: 180 capsule, Rfl: 1    insulin lispro (HUMALOG KWIKPEN) 100 Units/mL SOPN,  AD, Disp: , Rfl:     loperamide (IMODIUM) 2 mg capsule, Take by mouth, Disp: , Rfl:     metFORMIN (GLUCOPHAGE) 1000 MG tablet, Take 0 5 tablets (500 mg total) by mouth 2 (two) times a day with meals, Disp: 90 tablet, Rfl: 2    metoprolol succinate (TOPROL-XL) 100 mg 24 hr tablet, Take 100 mg by mouth daily  , Disp: , Rfl:     Mirabegron ER 50 MG TB24, Take 50 mg by mouth, Disp: , Rfl:     mirtazapine (REMERON) 15 mg tablet, , Disp: , Rfl:     multivitamin (THERAGRAN) TABS, Take 1 tablet by mouth daily  , Disp: , Rfl:     mupirocin (BACTROBAN) 2 % nasal ointment, , Disp: , Rfl:     omeprazole (PriLOSEC) 20 mg delayed release capsule, TAKE 1 CAPSULE DAILY, Disp: 90 capsule, Rfl: 1    fluticasone (VERAMYST) 27 5 MCG/SPRAY nasal spray, 2 sprays into each nostril daily, Disp: 10 g, Rfl: 0    furosemide (LASIX) 20 mg tablet, Take 1 tablet (20 mg total) by mouth daily for 90 days Take 1 tablet daily, Disp: 90 tablet, Rfl: 1    insulin lispro protamine-insulin lispro (HUMALOG MIX 75/25) 100 units/mL, as directed, Disp: , Rfl:     isosorbide mononitrate (IMDUR) 30 mg 24 hr tablet, Take 1 tablet (30 mg total) by mouth daily for 90 days, Disp: 90 tablet, Rfl: 1    LANTUS SOLOSTAR 100 units/mL injection pen, INJECT 50 UNITS            SUBCUTANEOUSLY AT BEDTIME, Disp: 45 mL, Rfl: 1    NOVOLOG FLEXPEN 100 UNIT/ML SOPN, , Disp: , Rfl:     tamsulosin (FLOMAX) 0 4 mg, Take 1 capsule (0 4 mg total) by mouth daily with dinner for 90 days, Disp: 90 capsule, Rfl: 1     Came in for follow-up visit after his total knee replacement he lost some weight initially is getting back on track with find out what type of insulin he takes pre meal   He follows of with Endocrinology did cut down his Lantus to about 42 units twice a day did not bring the blood sugar records for me to review  Blood pressure control  Coronary artery disease stable          The following portions of the patient's history were reviewed and updated as appropriate: allergies, current medications, past family history, past medical history, past social history, past surgical history and problem list     Review of Systems   Constitutional: Negative  Negative for activity change, appetite change, fatigue, fever and unexpected weight change  HENT: Negative for congestion, ear pain, hearing loss, mouth sores, postnasal drip, rhinorrhea, sore throat, trouble swallowing and voice change  Eyes: Negative for pain, redness and visual disturbance  Respiratory: Negative for cough, chest tightness, shortness of breath and wheezing      Cardiovascular: Negative for chest pain, palpitations and leg swelling  Gastrointestinal: Negative for abdominal distention, abdominal pain, blood in stool, constipation, diarrhea and nausea  Endocrine: Negative for cold intolerance, heat intolerance, polydipsia, polyphagia and polyuria  Genitourinary: Negative for difficulty urinating, dysuria, flank pain, frequency, hematuria and urgency  Musculoskeletal: Negative for arthralgias, back pain, gait problem, joint swelling and myalgias  Skin: Negative for color change and pallor  Neurological: Negative for dizziness, tremors, seizures, syncope, weakness, numbness and headaches  Hematological: Negative for adenopathy  Does not bruise/bleed easily  Psychiatric/Behavioral: Negative  Negative for sleep disturbance  The patient is not nervous/anxious            Objective:    Results for orders placed or performed in visit on 09/19/18   Comprehensive metabolic panel   Result Value Ref Range    Sodium 139 136 - 145 mmol/L    Potassium 4 5 3 5 - 5 3 mmol/L    Chloride 105 100 - 108 mmol/L    CO2 27 21 - 32 mmol/L    ANION GAP 7 4 - 13 mmol/L    BUN 20 5 - 25 mg/dL    Creatinine 1 57 (H) 0 60 - 1 30 mg/dL    Glucose, Fasting 106 (H) 65 - 99 mg/dL    Calcium 8 9 8 3 - 10 1 mg/dL    AST 28 5 - 45 U/L    ALT 47 12 - 78 U/L    Alkaline Phosphatase 90 46 - 116 U/L    Total Protein 7 5 6 4 - 8 2 g/dL    Albumin 3 6 3 5 - 5 0 g/dL    Total Bilirubin 0 53 0 20 - 1 00 mg/dL    eGFR 49 ml/min/1 73sq m   CBC and differential   Result Value Ref Range    WBC 7 98 4 31 - 10 16 Thousand/uL    RBC 3 96 3 88 - 5 62 Million/uL    Hemoglobin 12 4 12 0 - 17 0 g/dL    Hematocrit 37 0 36 5 - 49 3 %    MCV 93 82 - 98 fL    MCH 31 3 26 8 - 34 3 pg    MCHC 33 5 31 4 - 37 4 g/dL    RDW 13 0 11 6 - 15 1 %    MPV 10 7 8 9 - 12 7 fL    Platelets 329 906 - 264 Thousands/uL    nRBC 0 /100 WBCs    Neutrophils Relative 57 43 - 75 %    Immat GRANS % 0 0 - 2 %    Lymphocytes Relative 33 14 - 44 %    Monocytes Relative 7 4 - 12 % Eosinophils Relative 3 0 - 6 %    Basophils Relative 0 0 - 1 %    Neutrophils Absolute 4 47 1 85 - 7 62 Thousands/µL    Immature Grans Absolute 0 02 0 00 - 0 20 Thousand/uL    Lymphocytes Absolute 2 65 0 60 - 4 47 Thousands/µL    Monocytes Absolute 0 56 0 17 - 1 22 Thousand/µL    Eosinophils Absolute 0 25 0 00 - 0 61 Thousand/µL    Basophils Absolute 0 03 0 00 - 0 10 Thousands/µL   Gamma GT   Result Value Ref Range    GGT 32 5 - 85 U/L   Magnesium   Result Value Ref Range    Magnesium 2 1 1 6 - 2 6 mg/dL   APTT   Result Value Ref Range    PTT 30 24 - 36 seconds   Protime-INR   Result Value Ref Range    Protime 13 1 11 8 - 14 2 seconds    INR 0 98 0 86 - 1 17   Ferritin   Result Value Ref Range    Ferritin 73 8 - 388 ng/mL       /80 (BP Location: Left arm, Patient Position: Sitting, Cuff Size: Standard)   Pulse 80   Temp (!) 97 3 °F (36 3 °C)   Resp 15   Ht 6' 4" (1 93 m)   Wt 110 kg (243 lb)   BMI 29 58 kg/m²      Physical Exam   Constitutional: He is oriented to person, place, and time  He appears well-developed and well-nourished  HENT:   Head: Normocephalic  Right Ear: External ear normal    Left Ear: External ear normal    Nose: Nose normal    Mouth/Throat: Oropharynx is clear and moist  No oropharyngeal exudate  Eyes: Conjunctivae and EOM are normal  Pupils are equal, round, and reactive to light  Neck: Normal range of motion  Neck supple  No thyromegaly present  Cardiovascular: Normal rate, regular rhythm, normal heart sounds and intact distal pulses  Exam reveals no gallop and no friction rub  No murmur heard  S1-S2 no gallops  Extremities no edema or calf tenderness   Pulmonary/Chest: Effort normal and breath sounds normal  No respiratory distress  He has no wheezes  He has no rales  Lungs are clear no wheezing rales or rhonchi   Abdominal: Soft  Bowel sounds are normal  He exhibits no distension and no mass  There is no tenderness  There is no rebound and no guarding     Abdomen soft nontender   Musculoskeletal: Normal range of motion  The intact incision heal well  No swelling   Lymphadenopathy:     He has no cervical adenopathy  Neurological: He is alert and oriented to person, place, and time  Skin: Skin is warm and dry  Psychiatric: He has a normal mood and affect  His behavior is normal  Judgment normal    Nursing note and vitals reviewed

## 2018-09-20 NOTE — PATIENT INSTRUCTIONS
Status post total knee replacement he lost a little bit a weight of looks good will have to reconciliation the insulin type that he takes and change that on the list  Blood pressure is control   Renal function is stable will repeat that when you come back

## 2018-09-25 ENCOUNTER — TELEPHONE (OUTPATIENT)
Dept: ENDOCRINOLOGY | Facility: HOSPITAL | Age: 73
End: 2018-09-25

## 2018-09-25 NOTE — TELEPHONE ENCOUNTER
Pt called & schedule f/u for 18  Was last seen by you in 2017  Can you re-order whatever labs you want done prior to his November appt? I think the last ones will  soon  Can mail new orders to pt

## 2018-09-26 DIAGNOSIS — E11.622 TYPE 2 DIABETES MELLITUS WITH OTHER SKIN ULCER, WITH LONG-TERM CURRENT USE OF INSULIN (HCC): Primary | ICD-10-CM

## 2018-09-26 DIAGNOSIS — E11.8 TYPE 2 DIABETES MELLITUS WITH COMPLICATION, UNSPECIFIED WHETHER LONG TERM INSULIN USE: Primary | ICD-10-CM

## 2018-09-26 DIAGNOSIS — I10 ESSENTIAL HYPERTENSION: ICD-10-CM

## 2018-09-26 DIAGNOSIS — Z79.4 TYPE 2 DIABETES MELLITUS WITH OTHER SKIN ULCER, WITH LONG-TERM CURRENT USE OF INSULIN (HCC): Primary | ICD-10-CM

## 2018-09-26 RX ORDER — INSULIN ASPART 100 [IU]/ML
15 INJECTION, SOLUTION INTRAVENOUS; SUBCUTANEOUS
Qty: 15 PEN | Refills: 0 | Status: SHIPPED | OUTPATIENT
Start: 2018-09-26 | End: 2018-11-30 | Stop reason: SDUPTHER

## 2018-09-26 NOTE — TELEPHONE ENCOUNTER
Pt needs a refill of his Novolog  He has appt with Fela Vann in November  Dr Amanda Connelly prescribed Humalog for pt but he said he doesn't take that and he wants us to manage the DM, not Dr Amanda Connelly  It's coming up as an Allergy but pt said he is not allergic to it

## 2018-10-25 DIAGNOSIS — M54.5 LOW BACK PAIN, UNSPECIFIED BACK PAIN LATERALITY, UNSPECIFIED CHRONICITY, WITH SCIATICA PRESENCE UNSPECIFIED: ICD-10-CM

## 2018-10-25 RX ORDER — GABAPENTIN 300 MG/1
CAPSULE ORAL
Qty: 180 CAPSULE | Refills: 0 | Status: SHIPPED | OUTPATIENT
Start: 2018-10-25 | End: 2019-01-17 | Stop reason: SDUPTHER

## 2018-10-26 ENCOUNTER — OFFICE VISIT (OUTPATIENT)
Dept: INTERNAL MEDICINE CLINIC | Facility: CLINIC | Age: 73
End: 2018-10-26
Payer: MEDICARE

## 2018-10-26 VITALS
HEIGHT: 76 IN | SYSTOLIC BLOOD PRESSURE: 126 MMHG | RESPIRATION RATE: 15 BRPM | DIASTOLIC BLOOD PRESSURE: 78 MMHG | WEIGHT: 244 LBS | HEART RATE: 75 BPM | BODY MASS INDEX: 29.71 KG/M2 | TEMPERATURE: 98.5 F

## 2018-10-26 DIAGNOSIS — J06.9 VIRAL UPPER RESPIRATORY TRACT INFECTION: ICD-10-CM

## 2018-10-26 DIAGNOSIS — I25.10 CORONARY ARTERY DISEASE INVOLVING NATIVE HEART WITHOUT ANGINA PECTORIS, UNSPECIFIED VESSEL OR LESION TYPE: ICD-10-CM

## 2018-10-26 DIAGNOSIS — I10 ESSENTIAL HYPERTENSION: ICD-10-CM

## 2018-10-26 DIAGNOSIS — E11.622 TYPE 2 DIABETES MELLITUS WITH OTHER SKIN ULCER, WITH LONG-TERM CURRENT USE OF INSULIN (HCC): ICD-10-CM

## 2018-10-26 DIAGNOSIS — Z79.4 TYPE 2 DIABETES MELLITUS WITH OTHER SKIN ULCER, WITH LONG-TERM CURRENT USE OF INSULIN (HCC): ICD-10-CM

## 2018-10-26 DIAGNOSIS — J40 BRONCHITIS: Primary | ICD-10-CM

## 2018-10-26 PROCEDURE — 99214 OFFICE O/P EST MOD 30 MIN: CPT | Performed by: INTERNAL MEDICINE

## 2018-10-26 RX ORDER — AZITHROMYCIN 250 MG/1
TABLET, FILM COATED ORAL
Qty: 6 TABLET | Refills: 0 | Status: SHIPPED | OUTPATIENT
Start: 2018-10-26 | End: 2018-10-31

## 2018-10-26 RX ORDER — BENZONATATE 200 MG/1
200 CAPSULE ORAL 3 TIMES DAILY PRN
Qty: 20 CAPSULE | Refills: 0 | Status: SHIPPED | OUTPATIENT
Start: 2018-10-26 | End: 2018-11-21 | Stop reason: ALTCHOICE

## 2018-10-26 NOTE — PATIENT INSTRUCTIONS
Acute Bronchitis   WHAT YOU NEED TO KNOW:   Acute bronchitis is swelling and irritation in the air passages of your lungs  This irritation may cause you to cough or have other breathing problems  Acute bronchitis often starts because of another illness, such as a cold or the flu  The illness spreads from your nose and throat to your windpipe and airways  Bronchitis is often called a chest cold  Acute bronchitis lasts about 3 to 6 weeks and is usually not a serious illness  Your cough can last for several weeks  DISCHARGE INSTRUCTIONS:   Return to the emergency department if:   · You cough up blood  · Your lips or fingernails turn blue  · You feel like you are not getting enough air when you breathe  Contact your healthcare provider if:   · You have a fever  · Your breathing problems do not go away or get worse  · Your cough does not get better within 4 weeks  · You have questions or concerns about your condition or care  Self-care:   · Get more rest   Rest helps your body to heal  Slowly start to do more each day  Rest when you feel it is needed  · Avoid irritants in the air  Avoid chemicals, fumes, and dust  Wear a face mask if you must work around dust or fumes  Stay inside on days when air pollution levels are high  If you have allergies, stay inside when pollen counts are high  Do not use aerosol products, such as spray-on deodorant, bug spray, and hair spray  · Do not smoke or be around others who smoke  Nicotine and other chemicals in cigarettes and cigars damages the cilia that move mucus out of your lungs  Ask your healthcare provider for information if you currently smoke and need help to quit  E-cigarettes or smokeless tobacco still contain nicotine  Talk to your healthcare provider before you use these products  · Drink liquids as directed  Liquids help keep your air passages moist and help you cough up mucus   You may need to drink more liquids when you have acute bronchitis  Ask how much liquid to drink each day and which liquids are best for you  · Use a humidifier or vaporizer  Use a cool mist humidifier or a vaporizer to increase air moisture in your home  This may make it easier for you to breathe and help decrease your cough  Decrease risk for acute bronchitis:   · Get the vaccinations you need  Ask your healthcare provider if you should get vaccinated against the flu or pneumonia  · Prevent the spread of germs  You can decrease your risk of acute bronchitis and other illnesses by doing the following:     [facility] your hands often with soap and water  Carry germ-killing hand lotion or gel with you  You can use the lotion or gel to clean your hands when soap and water are not available  ¨ Do not touch your eyes, nose, or mouth unless you have washed your hands first     ¨ Always cover your mouth when you cough to prevent the spread of germs  It is best to cough into a tissue or your shirt sleeve instead of into your hand  Ask those around you cover their mouths when they cough  ¨ Try to avoid people who have a cold or the flu  If you are sick, stay away from others as much as possible  Medicines: Your healthcare provider may  give you any of the following:  · Ibuprofen or acetaminophen  are medicines that help lower your fever  They are available without a doctor's order  Ask your healthcare provider which medicine is right for you  Ask how much to take and how often to take it  Follow directions  These medicines can cause stomach bleeding if not taken correctly  Ibuprofen can cause kidney damage  Do not take ibuprofen if you have kidney disease, an ulcer, or allergies to aspirin  Acetaminophen can cause liver damage  Do not take more than 4,000 milligrams in 24 hours  · Decongestants  help loosen mucus in your lungs and make it easier to cough up  This can help you breathe easier  · Cough suppressants  decrease your urge to cough   If your cough produces mucus, do not take a cough suppressant unless your healthcare provider tells you to  Your healthcare provider may suggest that you take a cough suppressant at night so you can rest     · Inhalers  may be given  Your healthcare provider may give you one or more inhalers to help you breathe easier and cough less  An inhaler gives your medicine to open your airways  Ask your healthcare provider to show you how to use your inhaler correctly  · Take your medicine as directed  Contact your healthcare provider if you think your medicine is not helping or if you have side effects  Tell him of her if you are allergic to any medicine  Keep a list of the medicines, vitamins, and herbs you take  Include the amounts, and when and why you take them  Bring the list or the pill bottles to follow-up visits  Carry your medicine list with you in case of an emergency  Follow up with your healthcare provider as directed:  Write down questions you have so you will remember to ask them during your follow-up visits  © 2017 [employer] Information is for End User's use only and may not be sold, redistributed or otherwise used for commercial purposes  All illustrations and images included in CareNotes® are the copyrighted property of [employer]  or [employer]  The above information is an  only  It is not intended as medical advice for individual conditions or treatments  Talk to your doctor, nurse or pharmacist before following any medical regimen to see if it is safe and effective for you  Will going to give use is a Z-Torres to take as directed    Tessalon Perles to take as needed for cough take Tylenol for achiness and pain in symptoms worsen call us back or go to the emergency

## 2018-10-26 NOTE — PROGRESS NOTES
Assessment/Plan:    Viral upper respiratory tract infection   Could be viral bacteria but because of his comorbidities will give him a Z-Torres and Tessalon Perles he appears in no acute respiratory distress he has no fever    Diabetes mellitus ([facility] [address] )  Lab Results   Component Value Date    HGBA1C 7 6 09/20/2018       No results for input(s): POCGLU in the last 72 hours  Blood Sugar Average: Last 72 hrs: no polyuria polydipsia hemoglobin A1c is acceptable he does follow with the Endocrinology I told him while he has the upper respiratory tract infection to monitor his blood sugars      Hypertension   Blood pressure is well controlled no chest pain or palpitation  Or headaches    CAD (coronary artery disease)   Appears you anemic no evidence of heart failure no dependent edema or calf tenderness       Diagnoses and all orders for this visit:    Bronchitis  -     azithromycin (ZITHROMAX) 250 mg tablet; Take 2 tablets the 1st day and 1 tablet daily for 4 days  -     benzonatate (TESSALON) 200 MG capsule; Take 1 capsule (200 mg total) by mouth 3 (three) times a day as needed for cough    Viral upper respiratory tract infection    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, with long-term current use of insulin ([facility])    Essential hypertension    Coronary artery disease involving native heart without angina pectoris, unspecified vessel or lesion type          Subjective:      Patient ID: Jesus Venegas is a 74 y o  male  Chief Complaint   Patient presents with    Cough     yellowish/green phlegm x2 days    Chills    Other     chest congestion         Current Outpatient Prescriptions:     ascorbic Acid (VITAMIN C) 500 MG CPCR, Take 1 capsule (500 mg total) by mouth 2 (two) times a day, Disp: 60 capsule, Rfl: 1    aspirin 81 MG tablet, Take 81 mg by mouth daily  , Disp: , Rfl:     Azelastine HCl 0 15 % SOLN, Inhale 1 spray 2 (two) times a day, Disp: , Rfl: 0    bimatoprost (LUMIGAN) 0 01 % ophthalmic drops, 1 drop daily at bedtime  , Disp: , Rfl:     bismuth subsalicylate (PEPTO BISMOL) 262 MG chewable tablet, Chew 524 mg daily as needed for indigestion  , Disp: , Rfl:     Cholecalciferol (VITAMIN D3) 49452 units CAPS, TAKE 3 CAPSULES PER MONTH, Disp: 12 capsule, Rfl: 0    escitalopram (LEXAPRO) 20 mg tablet, 1/d, Disp: , Rfl:     esomeprazole (NEXIUM) 20 mg capsule, 1/d, Disp: , Rfl:     fluocinonide (LIDEX) 0 05 % cream, Apply topically 2 (two) times a day , Disp: , Rfl:     fluticasone (FLONASE) 50 mcg/act nasal spray, 1 spray into each nostril daily, Disp: 16 g, Rfl: 3    folic acid (FOLVITE) 1 mg tablet, Take 1 tablet (1 mg total) by mouth daily, Disp: 30 tablet, Rfl: 1    gabapentin (NEURONTIN) 300 mg capsule, Take 2 capsules at bedtime, Disp: 180 capsule, Rfl: 0    insulin glargine (LANTUS SOLOSTAR) 100 units/mL injection pen, Inject 42 Units under the skin daily at bedtime, Disp: 45 mL, Rfl: 0    loperamide (IMODIUM) 2 mg capsule, Take by mouth, Disp: , Rfl:     metFORMIN (GLUCOPHAGE) 1000 MG tablet, Take 0 5 tablets (500 mg total) by mouth 2 (two) times a day with meals, Disp: 90 tablet, Rfl: 2    metoprolol succinate (TOPROL-XL) 100 mg 24 hr tablet, Take 100 mg by mouth daily  , Disp: , Rfl:     Mirabegron ER 50 MG TB24, Take 50 mg by mouth, Disp: , Rfl:     mirtazapine (REMERON) 15 mg tablet, , Disp: , Rfl:     multivitamin (THERAGRAN) TABS, Take 1 tablet by mouth daily  , Disp: , Rfl:     mupirocin (BACTROBAN) 2 % nasal ointment, , Disp: , Rfl:     NOVOLOG FLEXPEN 100 units/mL injection pen, Inject 15 Units under the skin 3 (three) times a day with meals, Disp: 15 pen, Rfl: 0    omeprazole (PriLOSEC) 20 mg delayed release capsule, TAKE 1 CAPSULE DAILY, Disp: 90 capsule, Rfl: 1    azithromycin (ZITHROMAX) 250 mg tablet, Take 2 tablets the 1st day and 1 tablet daily for 4 days, Disp: 6 tablet, Rfl: 0    benzonatate (TESSALON) 200 MG capsule, Take 1 capsule (200 mg total) by mouth 3 (three) times a day as needed for cough, Disp: 20 capsule, Rfl: 0    furosemide (LASIX) 20 mg tablet, Take 1 tablet (20 mg total) by mouth daily for 90 days Take 1 tablet daily, Disp: 90 tablet, Rfl: 1    isosorbide mononitrate (IMDUR) 30 mg 24 hr tablet, Take 1 tablet (30 mg total) by mouth daily for 90 days, Disp: 90 tablet, Rfl: 1    tamsulosin (FLOMAX) 0 4 mg, Take 1 capsule (0 4 mg total) by mouth daily with dinner for 90 days, Disp: 90 capsule, Rfl: 1    HPI    The following portions of the patient's history were reviewed and updated as appropriate: allergies, current medications, past family history, past medical history, past social history, past surgical history and problem list     Review of Systems   Constitutional: Negative  Negative for activity change, appetite change, fatigue, fever and unexpected weight change  HENT: Negative for congestion, ear pain, hearing loss, mouth sores, postnasal drip, rhinorrhea, sore throat, trouble swallowing and voice change  Eyes: Negative for pain, redness and visual disturbance  Respiratory: Positive for cough  Negative for chest tightness, shortness of breath and wheezing  Increasing yellowing green phlegm   Cardiovascular: Negative for chest pain, palpitations and leg swelling  Gastrointestinal: Negative for abdominal distention, abdominal pain, blood in stool, constipation, diarrhea and nausea  Endocrine: Negative for cold intolerance, heat intolerance, polydipsia, polyphagia and polyuria  Genitourinary: Negative for difficulty urinating, dysuria, flank pain, frequency, hematuria and urgency  Musculoskeletal: Negative for arthralgias, back pain, gait problem, joint swelling and myalgias  Skin: Negative for color change and pallor  Neurological: Negative for dizziness, tremors, seizures, syncope, weakness, numbness and headaches  Hematological: Negative for adenopathy  Does not bruise/bleed easily  Psychiatric/Behavioral: Negative    Negative for sleep disturbance  The patient is not nervous/anxious  Objective:    /78 (BP Location: Left arm, Patient Position: Sitting, Cuff Size: Large)   Pulse 75   Temp 98 5 °F (36 9 °C)   Resp 15   Ht 6' 4" (1 93 m)   Wt 111 kg (244 lb)   BMI 29 70 kg/m²      Physical Exam   Constitutional: He is oriented to person, place, and time  He appears well-developed and well-nourished  HENT:   Head: Normocephalic  Right Ear: External ear normal    Left Ear: External ear normal    Nose: Nose normal    Mouth/Throat: Oropharynx is clear and moist  No oropharyngeal exudate  No cervical lymphadenopathy   Eyes: Pupils are equal, round, and reactive to light  Conjunctivae and EOM are normal    Neck: Normal range of motion  Neck supple  No thyromegaly present  Cardiovascular: Normal rate, regular rhythm, normal heart sounds and intact distal pulses  Exam reveals no gallop and no friction rub  No murmur heard  S1-S2 no gallops or murmur  Extremities no edema  Pulmonary/Chest: Effort normal and breath sounds normal  No respiratory distress  He has no wheezes  He has no rales  Lungs decreased breath sounds but appears clear occasional rhonchi at the bases but no wheezing or rales   Abdominal: Soft  Bowel sounds are normal  He exhibits no distension and no mass  There is no tenderness  There is no rebound and no guarding  Abdomen soft nontender no 6 suprapubic tenderness no CVA tenderness   Musculoskeletal: Normal range of motion  Lymphadenopathy:     He has no cervical adenopathy  Neurological: He is alert and oriented to person, place, and time  Skin: Skin is warm and dry  Psychiatric: He has a normal mood and affect  His behavior is normal  Judgment normal    Nursing note and vitals reviewed

## 2018-10-26 NOTE — ASSESSMENT & PLAN NOTE
Lab Results   Component Value Date    HGBA1C 7 6 09/20/2018       No results for input(s): POCGLU in the last 72 hours      Blood Sugar Average: Last 72 hrs: no polyuria polydipsia hemoglobin A1c is acceptable he does follow with the Endocrinology I told him while he has the upper respiratory tract infection to monitor his blood sugars

## 2018-10-26 NOTE — ASSESSMENT & PLAN NOTE
Could be viral bacteria but because of his comorbidities will give him a Z-Torres and Tessalon Perles he appears in no acute respiratory distress he has no fever

## 2018-11-01 ENCOUNTER — APPOINTMENT (OUTPATIENT)
Dept: LAB | Facility: CLINIC | Age: 73
End: 2018-11-01
Payer: MEDICARE

## 2018-11-01 ENCOUNTER — OFFICE VISIT (OUTPATIENT)
Dept: INTERNAL MEDICINE CLINIC | Facility: CLINIC | Age: 73
End: 2018-11-01
Payer: MEDICARE

## 2018-11-01 VITALS
SYSTOLIC BLOOD PRESSURE: 134 MMHG | HEART RATE: 72 BPM | WEIGHT: 244 LBS | BODY MASS INDEX: 29.71 KG/M2 | HEIGHT: 76 IN | DIASTOLIC BLOOD PRESSURE: 80 MMHG | RESPIRATION RATE: 15 BRPM | TEMPERATURE: 97.2 F

## 2018-11-01 DIAGNOSIS — I25.10 CORONARY ARTERY DISEASE INVOLVING NATIVE HEART WITHOUT ANGINA PECTORIS, UNSPECIFIED VESSEL OR LESION TYPE: ICD-10-CM

## 2018-11-01 DIAGNOSIS — E11.8 TYPE 2 DIABETES MELLITUS WITH COMPLICATION, WITH LONG-TERM CURRENT USE OF INSULIN (HCC): ICD-10-CM

## 2018-11-01 DIAGNOSIS — M54.41 CHRONIC BILATERAL LOW BACK PAIN WITH BILATERAL SCIATICA: ICD-10-CM

## 2018-11-01 DIAGNOSIS — N18.30 CKD (CHRONIC KIDNEY DISEASE) STAGE 3, GFR 30-59 ML/MIN (HCC): ICD-10-CM

## 2018-11-01 DIAGNOSIS — E11.622 TYPE 2 DIABETES MELLITUS WITH OTHER SKIN ULCER, WITH LONG-TERM CURRENT USE OF INSULIN (HCC): Primary | ICD-10-CM

## 2018-11-01 DIAGNOSIS — E11.622 TYPE 2 DIABETES MELLITUS WITH OTHER SKIN ULCER, WITH LONG-TERM CURRENT USE OF INSULIN (HCC): ICD-10-CM

## 2018-11-01 DIAGNOSIS — Z79.4 TYPE 2 DIABETES MELLITUS WITH OTHER SKIN ULCER, WITH LONG-TERM CURRENT USE OF INSULIN (HCC): ICD-10-CM

## 2018-11-01 DIAGNOSIS — I10 ESSENTIAL HYPERTENSION: ICD-10-CM

## 2018-11-01 DIAGNOSIS — Z79.4 TYPE 2 DIABETES MELLITUS WITH OTHER SKIN ULCER, WITH LONG-TERM CURRENT USE OF INSULIN (HCC): Primary | ICD-10-CM

## 2018-11-01 DIAGNOSIS — Z79.4 TYPE 2 DIABETES MELLITUS WITH COMPLICATION, WITH LONG-TERM CURRENT USE OF INSULIN (HCC): ICD-10-CM

## 2018-11-01 DIAGNOSIS — Z23 NEED FOR INFLUENZA VACCINATION: ICD-10-CM

## 2018-11-01 DIAGNOSIS — E11.8 TYPE 2 DIABETES MELLITUS WITH COMPLICATION, UNSPECIFIED WHETHER LONG TERM INSULIN USE: ICD-10-CM

## 2018-11-01 DIAGNOSIS — G89.29 CHRONIC BILATERAL LOW BACK PAIN WITH BILATERAL SCIATICA: ICD-10-CM

## 2018-11-01 DIAGNOSIS — M54.42 CHRONIC BILATERAL LOW BACK PAIN WITH BILATERAL SCIATICA: ICD-10-CM

## 2018-11-01 LAB
ALBUMIN SERPL BCP-MCNC: 3.4 G/DL (ref 3.5–5)
ALP SERPL-CCNC: 101 U/L (ref 46–116)
ALT SERPL W P-5'-P-CCNC: 86 U/L (ref 12–78)
ANION GAP SERPL CALCULATED.3IONS-SCNC: 2 MMOL/L (ref 4–13)
AST SERPL W P-5'-P-CCNC: 59 U/L (ref 5–45)
BASOPHILS # BLD AUTO: 0.04 THOUSANDS/ΜL (ref 0–0.1)
BASOPHILS NFR BLD AUTO: 1 % (ref 0–1)
BILIRUB SERPL-MCNC: 0.38 MG/DL (ref 0.2–1)
BUN SERPL-MCNC: 27 MG/DL (ref 5–25)
CALCIUM SERPL-MCNC: 8.7 MG/DL (ref 8.3–10.1)
CHLORIDE SERPL-SCNC: 104 MMOL/L (ref 100–108)
CHOLEST SERPL-MCNC: 144 MG/DL (ref 50–200)
CO2 SERPL-SCNC: 30 MMOL/L (ref 21–32)
CREAT SERPL-MCNC: 1.44 MG/DL (ref 0.6–1.3)
CREAT UR-MCNC: 129 MG/DL
EOSINOPHIL # BLD AUTO: 0.17 THOUSAND/ΜL (ref 0–0.61)
EOSINOPHIL NFR BLD AUTO: 2 % (ref 0–6)
ERYTHROCYTE [DISTWIDTH] IN BLOOD BY AUTOMATED COUNT: 13.2 % (ref 11.6–15.1)
EST. AVERAGE GLUCOSE BLD GHB EST-MCNC: 194 MG/DL
GFR SERPL CREATININE-BSD FRML MDRD: 55 ML/MIN/1.73SQ M
GLUCOSE P FAST SERPL-MCNC: 80 MG/DL (ref 65–99)
HBA1C MFR BLD: 8.4 % (ref 4.2–6.3)
HCT VFR BLD AUTO: 40.3 % (ref 36.5–49.3)
HDLC SERPL-MCNC: 36 MG/DL (ref 40–60)
HGB BLD-MCNC: 13.2 G/DL (ref 12–17)
IMM GRANULOCYTES # BLD AUTO: 0.03 THOUSAND/UL (ref 0–0.2)
IMM GRANULOCYTES NFR BLD AUTO: 0 % (ref 0–2)
LDLC SERPL CALC-MCNC: 78 MG/DL (ref 0–100)
LEFT EYE DIABETIC RETINOPATHY: NORMAL
LYMPHOCYTES # BLD AUTO: 2.92 THOUSANDS/ΜL (ref 0.6–4.47)
LYMPHOCYTES NFR BLD AUTO: 38 % (ref 14–44)
MCH RBC QN AUTO: 30.8 PG (ref 26.8–34.3)
MCHC RBC AUTO-ENTMCNC: 32.8 G/DL (ref 31.4–37.4)
MCV RBC AUTO: 94 FL (ref 82–98)
MICROALBUMIN UR-MCNC: 369 MG/L (ref 0–20)
MICROALBUMIN/CREAT 24H UR: 286 MG/G CREATININE (ref 0–30)
MONOCYTES # BLD AUTO: 0.59 THOUSAND/ΜL (ref 0.17–1.22)
MONOCYTES NFR BLD AUTO: 8 % (ref 4–12)
NEUTROPHILS # BLD AUTO: 3.88 THOUSANDS/ΜL (ref 1.85–7.62)
NEUTS SEG NFR BLD AUTO: 51 % (ref 43–75)
NRBC BLD AUTO-RTO: 0 /100 WBCS
PLATELET # BLD AUTO: 184 THOUSANDS/UL (ref 149–390)
PMV BLD AUTO: 11.2 FL (ref 8.9–12.7)
POTASSIUM SERPL-SCNC: 4.6 MMOL/L (ref 3.5–5.3)
PROT SERPL-MCNC: 7.1 G/DL (ref 6.4–8.2)
RBC # BLD AUTO: 4.29 MILLION/UL (ref 3.88–5.62)
RIGHT EYE DIABETIC RETINOPATHY: NORMAL
SEVERITY (EYE EXAM): NORMAL
SODIUM SERPL-SCNC: 136 MMOL/L (ref 136–145)
TRIGL SERPL-MCNC: 152 MG/DL
TSH SERPL DL<=0.05 MIU/L-ACNC: 2.26 UIU/ML (ref 0.36–3.74)
WBC # BLD AUTO: 7.63 THOUSAND/UL (ref 4.31–10.16)

## 2018-11-01 PROCEDURE — 82043 UR ALBUMIN QUANTITATIVE: CPT

## 2018-11-01 PROCEDURE — 90662 IIV NO PRSV INCREASED AG IM: CPT | Performed by: INTERNAL MEDICINE

## 2018-11-01 PROCEDURE — 82570 ASSAY OF URINE CREATININE: CPT

## 2018-11-01 PROCEDURE — 36415 COLL VENOUS BLD VENIPUNCTURE: CPT

## 2018-11-01 PROCEDURE — 85025 COMPLETE CBC W/AUTO DIFF WBC: CPT

## 2018-11-01 PROCEDURE — 80053 COMPREHEN METABOLIC PANEL: CPT

## 2018-11-01 PROCEDURE — 80061 LIPID PANEL: CPT

## 2018-11-01 PROCEDURE — G0008 ADMIN INFLUENZA VIRUS VAC: HCPCS | Performed by: INTERNAL MEDICINE

## 2018-11-01 PROCEDURE — 83036 HEMOGLOBIN GLYCOSYLATED A1C: CPT

## 2018-11-01 PROCEDURE — 99214 OFFICE O/P EST MOD 30 MIN: CPT | Performed by: INTERNAL MEDICINE

## 2018-11-01 PROCEDURE — 84443 ASSAY THYROID STIM HORMONE: CPT

## 2018-11-01 NOTE — ASSESSMENT & PLAN NOTE
Lab Results   Component Value Date    HGBA1C 7 6 09/20/2018       No results for input(s): POCGLU in the last 72 hours  Blood Sugar Average: Last 72 hrs:  Diabetes well controlled he had episodes of hypoglycemia so he cut down his Lantus at night to 37 which is appropriate is metformin was cut down to 1 g a day which is appropriate in view of his chronic renal insufficiency    He looks well

## 2018-11-01 NOTE — PATIENT INSTRUCTIONS
For back pain will order physical therapy  Your diabetes with the hypoglycemia you cut down the Lantus to 37 units at bedtime which is appropriate cut down the move metformin to a g a day    Your blood pressure is control  I will see her back in 3 months    Will let her know about her labs

## 2018-11-01 NOTE — PROGRESS NOTES
Assessment/Plan:  Had a few episodes of hypoglycemia so he did cut down his Lantus to 37 units at bedtime he cut down to is metformin to a g daily he has stage III chronic renal insufficiency  I updated the medication list    Diabetes mellitus ([facility] )  Lab Results   Component Value Date    HGBA1C 7 6 09/20/2018       No results for input(s): POCGLU in the last 72 hours  Blood Sugar Average: Last 72 hrs:  Diabetes well controlled he had episodes of hypoglycemia so he cut down his Lantus at night to 37 which is appropriate is metformin was cut down to 1 g a day which is appropriate in view of his chronic renal insufficiency  He looks well      CAD (coronary artery disease)  No angina no evidence of heart failure    Hypertension  Well controlled no change in medications    CKD (chronic kidney disease) stage 3, GFR 30-59 ml/min  He went for lab work today is metformin was decreased to 1 g a day       Diagnoses and all orders for this visit:    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, with long-term current use of insulin ([facility])  -     CBC and differential; Future  -     Hemoglobin A1C; Future  -     Basic metabolic panel; Future  -     Magnesium; Future    Essential hypertension  -     CBC and differential; Future  -     Hemoglobin A1C; Future  -     Basic metabolic panel; Future  -     Magnesium; Future    Coronary artery disease involving native heart without angina pectoris, unspecified vessel or lesion type  -     CBC and differential; Future  -     Hemoglobin A1C; Future  -     Basic metabolic panel; Future  -     Magnesium; Future    CKD (chronic kidney disease) stage 3, GFR 30-59 ml/min ([facility])  -     CBC and differential; Future  -     Hemoglobin A1C; Future  -     Basic metabolic panel; Future  -     Magnesium;  Future    Need for influenza vaccination  -     influenza vaccine, [id number], high-dose, PF 0 5 mL, for patients 65 yr+ (FLUZONE HIGH-DOSE)    Chronic bilateral low back pain with bilateral sciatica  -     Ambulatory referral to Physical Therapy; Future    Type 2 diabetes mellitus with complication, with long-term current use of insulin ([facility])  -     insulin glargine (LANTUS SOLOSTAR) 100 units/mL injection pen; 37 units bedtime    Type 2 diabetes mellitus with complication, unspecified whether long term insulin use ([facility])  -     metFORMIN (GLUCOPHAGE) 1000 MG tablet; Take 0 5 tablets (500 mg total) by mouth daily with breakfast    Other orders  -     B Complex-C-E-Zn (MYBEC PO); Take by mouth          Subjective:      Patient ID: Jaskaran Valencia is a 74 y o  male  Chief Complaint   Patient presents with    Follow-up     Needs referral for PT  Current Outpatient Prescriptions:     ascorbic Acid (VITAMIN C) 500 MG CPCR, Take 1 capsule (500 mg total) by mouth 2 (two) times a day, Disp: 60 capsule, Rfl: 1    aspirin 81 MG tablet, Take 81 mg by mouth daily  , Disp: , Rfl:     Azelastine HCl 0 15 % SOLN, Inhale 1 spray 2 (two) times a day, Disp: , Rfl: 0    B Complex-C-E-Zn (MYBEC PO), Take by mouth, Disp: , Rfl:     benzonatate (TESSALON) 200 MG capsule, Take 1 capsule (200 mg total) by mouth 3 (three) times a day as needed for cough, Disp: 20 capsule, Rfl: 0    bimatoprost (LUMIGAN) 0 01 % ophthalmic drops, 1 drop daily at bedtime  , Disp: , Rfl:     bismuth subsalicylate (PEPTO BISMOL) 262 MG chewable tablet, Chew 524 mg daily as needed for indigestion  , Disp: , Rfl:     Cholecalciferol (VITAMIN D3) 28505 units CAPS, TAKE 3 CAPSULES PER MONTH, Disp: 12 capsule, Rfl: 0    escitalopram (LEXAPRO) 20 mg tablet, 1/d, Disp: , Rfl:     esomeprazole (NEXIUM) 20 mg capsule, 1/d, Disp: , Rfl:     fluocinonide (LIDEX) 0 05 % cream, Apply topically 2 (two) times a day , Disp: , Rfl:     fluticasone (FLONASE) 50 mcg/act nasal spray, 1 spray into each nostril daily, Disp: 16 g, Rfl: 3    folic acid (FOLVITE) 1 mg tablet, Take 1 tablet (1 mg total) by mouth daily, Disp: 30 tablet, Rfl: 1   gabapentin (NEURONTIN) 300 mg capsule, Take 2 capsules at bedtime, Disp: 180 capsule, Rfl: 0    insulin glargine (LANTUS SOLOSTAR) 100 units/mL injection pen, 37 units bedtime, Disp: 45 mL, Rfl: 0    loperamide (IMODIUM) 2 mg capsule, Take by mouth, Disp: , Rfl:     metFORMIN (GLUCOPHAGE) 1000 MG tablet, Take 0 5 tablets (500 mg total) by mouth daily with breakfast, Disp: 90 tablet, Rfl: 0    metoprolol succinate (TOPROL-XL) 100 mg 24 hr tablet, Take 100 mg by mouth daily  , Disp: , Rfl:     mirtazapine (REMERON) 15 mg tablet, , Disp: , Rfl:     multivitamin (THERAGRAN) TABS, Take 1 tablet by mouth daily  , Disp: , Rfl:     mupirocin (BACTROBAN) 2 % nasal ointment, , Disp: , Rfl:     NOVOLOG FLEXPEN 100 units/mL injection pen, Inject 15 Units under the skin 3 (three) times a day with meals, Disp: 15 pen, Rfl: 0    omeprazole (PriLOSEC) 20 mg delayed release capsule, TAKE 1 CAPSULE DAILY, Disp: 90 capsule, Rfl: 1    furosemide (LASIX) 20 mg tablet, Take 1 tablet (20 mg total) by mouth daily for 90 days Take 1 tablet daily, Disp: 90 tablet, Rfl: 1    isosorbide mononitrate (IMDUR) 30 mg 24 hr tablet, Take 1 tablet (30 mg total) by mouth daily for 90 days, Disp: 90 tablet, Rfl: 1    Mirabegron ER 50 MG TB24, Take 50 mg by mouth, Disp: , Rfl:     tamsulosin (FLOMAX) 0 4 mg, Take 1 capsule (0 4 mg total) by mouth daily with dinner for 90 days, Disp: 90 capsule, Rfl: 1    We saw him for bronchitis last week all that has resolved  Reviewing the diabetes is had some episodes of hypoglycemia so he cut down his Lantus appropriately at bedtime to 37 is metformin to 1 g a day  No further hypoglycemia which is excellent hemoglobin A1c is 7 5 acceptable because of his age and comorbidities  Blood pressure control coronary artery disease no angina  He has had some low back pain that was triggered since his total knee replacement he needed a physical therapy to work on his back which was activated today    He has no reflexes in the lower extremities he has no back tenderness on percussion  He has no red flags or weakness fever or chills  The following portions of the patient's history were reviewed and updated as appropriate: allergies, current medications, past family history, past medical history, past social history, past surgical history and problem list     Review of Systems   Constitutional: Negative  Negative for activity change, appetite change, fatigue, fever and unexpected weight change  HENT: Negative for congestion, ear pain, hearing loss, mouth sores, postnasal drip, rhinorrhea, sore throat, trouble swallowing and voice change  Eyes: Negative for pain, redness and visual disturbance  Respiratory: Negative for cough, chest tightness, shortness of breath and wheezing  Cardiovascular: Negative for chest pain, palpitations and leg swelling  Gastrointestinal: Negative for abdominal distention, abdominal pain, blood in stool, constipation, diarrhea and nausea  Endocrine: Negative for cold intolerance, heat intolerance, polydipsia, polyphagia and polyuria  Genitourinary: Negative for difficulty urinating, dysuria, flank pain, frequency, hematuria and urgency  Musculoskeletal: Positive for back pain  Negative for arthralgias, gait problem, joint swelling and myalgias  Skin: Negative for color change and pallor  Neurological: Negative for dizziness, tremors, seizures, syncope, weakness, numbness and headaches  Hematological: Negative for adenopathy  Does not bruise/bleed easily  Psychiatric/Behavioral: Negative  Negative for sleep disturbance  The patient is not nervous/anxious            Objective:    Results for orders placed or performed in visit on 09/20/18   POCT hemoglobin A1c   Result Value Ref Range    Hemoglobin A1C 7 6        /80 (BP Location: Left arm, Patient Position: Sitting, Cuff Size: Standard)   Pulse 72   Temp (!) 97 2 °F (36 2 °C)   Resp 15   Ht 6' 4" (1 93 m) Wt 111 kg (244 lb)   BMI 29 70 kg/m²      Physical Exam   Constitutional: He is oriented to person, place, and time  He appears well-developed and well-nourished  HENT:   Head: Normocephalic  Right Ear: External ear normal    Left Ear: External ear normal    Nose: Nose normal    Mouth/Throat: Oropharynx is clear and moist  No oropharyngeal exudate  Eyes: Pupils are equal, round, and reactive to light  Conjunctivae and EOM are normal    Neck: Normal range of motion  Neck supple  No thyromegaly present  Cardiovascular: Normal rate, regular rhythm, normal heart sounds and intact distal pulses  Exam reveals no gallop and no friction rub  No murmur heard  S1-S2 regular rhythm no gallops  Extremities no edema   Pulmonary/Chest: Effort normal and breath sounds normal  No respiratory distress  He has no wheezes  He has no rales  Lungs are clear no wheezing rales or rhonchi   Abdominal: Soft  Bowel sounds are normal  He exhibits no distension and no mass  There is no tenderness  There is no rebound and no guarding  Abdomen soft nontender   Musculoskeletal: Normal range of motion  Lymphadenopathy:     He has no cervical adenopathy  Neurological: He is alert and oriented to person, place, and time  Reflex Scores:       Patellar reflexes are 0 on the right side and 0 on the left side  Skin: Skin is warm and dry  Psychiatric: He has a normal mood and affect  His behavior is normal  Judgment normal    Nursing note and vitals reviewed

## 2018-11-06 ENCOUNTER — OFFICE VISIT (OUTPATIENT)
Dept: ENDOCRINOLOGY | Facility: HOSPITAL | Age: 73
End: 2018-11-06
Payer: MEDICARE

## 2018-11-06 VITALS
HEIGHT: 76 IN | HEART RATE: 80 BPM | BODY MASS INDEX: 29.98 KG/M2 | WEIGHT: 246.2 LBS | SYSTOLIC BLOOD PRESSURE: 118 MMHG | DIASTOLIC BLOOD PRESSURE: 78 MMHG

## 2018-11-06 DIAGNOSIS — I10 ESSENTIAL HYPERTENSION: ICD-10-CM

## 2018-11-06 DIAGNOSIS — Z79.4 TYPE 2 DIABETES MELLITUS WITH STAGE 3 CHRONIC KIDNEY DISEASE, WITH LONG-TERM CURRENT USE OF INSULIN (HCC): Primary | ICD-10-CM

## 2018-11-06 DIAGNOSIS — N18.30 TYPE 2 DIABETES MELLITUS WITH STAGE 3 CHRONIC KIDNEY DISEASE, WITH LONG-TERM CURRENT USE OF INSULIN (HCC): Primary | ICD-10-CM

## 2018-11-06 DIAGNOSIS — E78.5 HYPERLIPIDEMIA, UNSPECIFIED HYPERLIPIDEMIA TYPE: ICD-10-CM

## 2018-11-06 DIAGNOSIS — E11.22 TYPE 2 DIABETES MELLITUS WITH STAGE 3 CHRONIC KIDNEY DISEASE, WITH LONG-TERM CURRENT USE OF INSULIN (HCC): Primary | ICD-10-CM

## 2018-11-06 PROCEDURE — 99214 OFFICE O/P EST MOD 30 MIN: CPT | Performed by: INTERNAL MEDICINE

## 2018-11-06 RX ORDER — BLOOD-GLUCOSE METER
EACH MISCELLANEOUS
Qty: 1 KIT | Refills: 0 | Status: SHIPPED | OUTPATIENT
Start: 2018-11-06 | End: 2018-12-29 | Stop reason: SDUPTHER

## 2018-11-06 NOTE — PROGRESS NOTES
11/6/2018    Assessment/Plan      Diagnoses and all orders for this visit:    Type 2 diabetes mellitus with stage 3 chronic kidney disease, with long-term current use of insulin ([facility])  -     Blood Glucose Monitoring Suppl (ACCU-CHEK RITA PLUS) w/Device KIT; Use as directed  -     HEMOGLOBIN A1C W/ EAG ESTIMATION Lab Collect; Future  -     Comprehensive metabolic panel Lab Collect; Future    Essential hypertension    Hyperlipidemia, unspecified hyperlipidemia type    Other orders  -     Insulin Pen Needle 31G X 8 MM MISC; BD Ultra-Fine Short Pen Needle 31 gauge x 5/16"        Assessment/Plan:  1  Type 2 diabetes with peripheral neuropathy:  A1c is improved but still slightly above goal   I have asked the patient to send in blood sugars in about 2 weeks for review on current regimen  We will plan to continue making changes to his regimen with a goal A1c of under 8 by the time I see him next would be in 4 months  He is up-to-date in eye and foot care as he follows with Ophthalmology and Podiatry regularly  I did discuss that given that he is checking his sugar at all times per day as well as fluctuating sugars and intensive insulin regimen he may benefit from personal C GM in the future  2   Hypertension:  Continue current regimen  3   Hyperlipidemia:  Reportedly has had intolerance or side effects to stay multiple statins in the past   Cholesterol numbers overall look okay  He does follow with Cardiology  CC: Diabetes Consult    History of Present Illness     HPI: Valentino Perez is a 74y o  year old male with type 2 diabetes for 24 years  He is on oral agents and insulin at home and takes NovoLog 15 units with meals, metformin 500 mg with breakfast, Lantus 37 units at bedtime  He denies any polyuria, polydipsia, nocturia and blurry vision  He denies nephropathy and retinopathy but does admit to neuropathy  Hypoglycemic episodes: No not recently       The patient's last eye exam was last week with good results  The patient's last foot exam was in July 2018 at his podiatrist     Blood Sugar/Glucometer/Pump/CGM review: No logs today but he will send some in  For hypertension he takes isosorbide  He is on Lasix as well as tamsulosin through other providers  He is not currently on any medicine for cholesterol  Review of Systems   Constitutional: Negative for fatigue  HENT: Negative for trouble swallowing and voice change  Eyes: Negative for visual disturbance  Respiratory: Negative for shortness of breath  Cardiovascular: Negative for palpitations and leg swelling  Gastrointestinal: Negative for abdominal pain, nausea and vomiting  Endocrine: Negative for cold intolerance, heat intolerance, polydipsia and polyuria  Musculoskeletal: Negative for arthralgias and myalgias  Skin: Negative for rash  Neurological: Negative for dizziness, tremors and weakness  Hematological: Negative for adenopathy  Psychiatric/Behavioral: Negative for agitation and confusion  Historical Information   Past Medical History:   Diagnosis Date    Anemia     Last assessed: 9/28/17    Arteriosclerotic cardiovascular disease     Last assessed: 9/28/17    [facility]adder [facility] ([facility] )     Cardiac disease     Diabetes mellitus ([facility] )     Hypertension     Hypotension     Last assessed: 2/24/15    Insomnia     Last assessed: 11/14/12    Other seasonal allergic rhinitis     Last assessed: 2/10/16    Transient cerebral ischemia      Past Surgical History:   Procedure Laterality Date    CARDIAC SURGERY      Cath stent placement  Last assessed: 3/9/17  Interventional Catheterization    CHOLECYSTECTOMY      CYSTOSCOPY      Diagnostic w/biopsy  Malika Alfred  Last assessed: 12/1/14    CYSTOURETHROSCOPY      w/cautery  Malika Alfred    GASTRIC BYPASS      For morbid obesity w/Shaji-en-Y   Resolved: 11/17/09    INCISION AND DRAINAGE OF WOUND Right 2/26/2017    Procedure: INCISION AND DRAINAGE (I&D) EXTREMITY WITH APPLICATION OF GRAFT JACKET;  Surgeon: Cj Alejandre DPM;  Location: [facility];  Service:     INCISION AND DRAINAGE OF WOUND Right 4/25/2017    Procedure: INCISION AND DRAINAGE (I&D) EXTREMITY, APPLICATION OF GRAFT;  Surgeon: Cj Alejandre DPM;  [facility];  Service:     JOINT REPLACEMENT      Knee  Last assessed: 1/28/11    NV CYSTOURETHROSCOPY,BIOPSY N/A 8/16/2016    Procedure: Richerd Ripple;  Surgeon: Madeline Morgan MD;  [facility];  Service: Urology    ROTATOR CUFF REPAIR      SMALL INTESTINE SURGERY      Surgery Shaji-en-Y    VAC DRESSING APPLICATION Right 1/86/0706    Procedure: APPLICATION VAC DRESSING;  Surgeon: Cj Alejandre DPM;  [facility];  Service:      Social History   History   Alcohol Use No     History   Drug Use No     History   Smoking Status    Former Smoker   Smokeless Tobacco    Never Used     Family History:   Family History   Problem Relation Age of Onset    Diabetes Mother     Heart disease Mother     Other Mother         High blood pressure    Heart disease Father     Diabetes Sister     Other Sister         High blood pressure    Kidney disease Sister     Heart disease Brother     Other Brother         High blood pressure       Meds/Allergies   Current Outpatient Prescriptions   Medication Sig Dispense Refill    aspirin 81 MG tablet Take 81 mg by mouth daily   Azelastine HCl 0 15 % SOLN Inhale 1 spray 2 (two) times a day  0    benzonatate (TESSALON) 200 MG capsule Take 1 capsule (200 mg total) by mouth 3 (three) times a day as needed for cough 20 capsule 0    bimatoprost (LUMIGAN) 0 01 % ophthalmic drops 1 drop daily at bedtime   bismuth subsalicylate (PEPTO BISMOL) 262 MG chewable tablet Chew 524 mg daily as needed for indigestion        Cholecalciferol (VITAMIN D3) 91822 units CAPS TAKE 3 CAPSULES PER MONTH 12 capsule 0    escitalopram (LEXAPRO) 20 mg tablet 1/d      fluocinonide (LIDEX) 0 05 % cream Apply topically 2 (two) times a day   fluticasone (FLONASE) 50 mcg/act nasal spray 1 spray into each nostril daily 16 g 3    gabapentin (NEURONTIN) 300 mg capsule Take 2 capsules at bedtime 180 capsule 0    insulin glargine (LANTUS SOLOSTAR) 100 units/mL injection pen 37 units bedtime 45 mL 0    Insulin Pen Needle 31G X 8 MM MISC BD Ultra-Fine Short Pen Needle 31 gauge x 5/16"      isosorbide mononitrate (IMDUR) 30 mg 24 hr tablet Take 1 tablet (30 mg total) by mouth daily for 90 days 90 tablet 1    metFORMIN (GLUCOPHAGE) 1000 MG tablet Take 0 5 tablets (500 mg total) by mouth daily with breakfast 90 tablet 0    metoprolol succinate (TOPROL-XL) 100 mg 24 hr tablet Take 100 mg by mouth daily   Mirabegron ER 50 MG TB24 Take 50 mg by mouth      multivitamin (THERAGRAN) TABS Take 1 tablet by mouth daily   NOVOLOG FLEXPEN 100 units/mL injection pen Inject 15 Units under the skin 3 (three) times a day with meals 15 pen 0    omeprazole (PriLOSEC) 20 mg delayed release capsule TAKE 1 CAPSULE DAILY 90 capsule 1    tamsulosin (FLOMAX) 0 4 mg Take 1 capsule (0 4 mg total) by mouth daily with dinner for 90 days 90 capsule 1    ascorbic Acid (VITAMIN C) 500 MG CPCR Take 1 capsule (500 mg total) by mouth 2 (two) times a day (Patient not taking: Reported on 11/6/2018 ) 60 capsule 1    B Complex-C-E-Zn (MYBEC PO) Take by mouth      Blood Glucose Monitoring Suppl (ACCU-CHEK RITA PLUS) w/Device KIT Use as directed   1 kit 0    esomeprazole (NEXIUM) 20 mg capsule 1/d      folic acid (FOLVITE) 1 mg tablet Take 1 tablet (1 mg total) by mouth daily (Patient not taking: Reported on 11/6/2018 ) 30 tablet 1    furosemide (LASIX) 20 mg tablet Take 1 tablet (20 mg total) by mouth daily for 90 days Take 1 tablet daily 90 tablet 1    loperamide (IMODIUM) 2 mg capsule Take by mouth      mirtazapine (REMERON) 15 mg tablet       mupirocin (BACTROBAN) 2 % nasal ointment        No current facility-administered medications for this visit  Allergies   Allergen Reactions    Atorvastatin Hives, Other (See Comments) and Itching     Pt  Reports "Hives"  Lipitor and Simvastatin    Simvastatin Rash     Other reaction(s): Edema, Other Reaction  "ALL STATINS"    Insulin Lispro Edema    Statins Itching    Other Other (See Comments), Itching and Rash     rash to electrodes  rash to electrodes and adhesives       Objective   Vitals: Blood pressure 118/78, pulse 80, height 6' 4" (1 93 m), weight 112 kg (246 lb 3 2 oz)  Invasive Devices     Peripherally Inserted Central Catheter Line            PICC Line 84/90/24 Basilic 483 days          Drain            Negative Pressure Wound Therapy (V A C ) Foot Right 618 days                Physical Exam   Constitutional: He is oriented to person, place, and time  He appears well-developed and well-nourished  No distress  HENT:   Head: Normocephalic and atraumatic  Eyes: Pupils are equal, round, and reactive to light  Conjunctivae are normal    Neck: Normal range of motion  Neck supple  No thyromegaly present  Cardiovascular: Normal rate and regular rhythm  Pulmonary/Chest: Effort normal and breath sounds normal  No respiratory distress  Abdominal: Soft  Bowel sounds are normal  He exhibits no distension  Musculoskeletal: Normal range of motion  He exhibits no edema  Neurological: He is alert and oriented to person, place, and time  He exhibits normal muscle tone  Skin: Skin is warm and dry  No rash noted  He is not diaphoretic  Psychiatric: He has a normal mood and affect  His behavior is normal        The history was obtained from the review of the chart and from the patient      Lab Results:    Most recent Al[facility] is  Lab Results   Component Value Date    HGBA1C 8 4 (H) 11/01/2018           No components found for: HA1C  No components found for: GLU    Lab Results   Component Value Date    CREATININE 1 44 (H) 11/01/2018    CREATININE 1 57 (H) 09/19/2018    CREATININE 1 59 (H) 08/28/2018    BUN 27 (H) 11/01/2018     09/03/2015    K 4 6 11/01/2018     11/01/2018    CO2 30 11/01/2018     eGFR   Date Value Ref Range Status   11/01/2018 55 ml/min/1 73sq m Final     No components found for: [facility]    Lab Results   Component Value Date    CHOL 139 03/17/2015    HDL 36 (L) 11/01/2018    TRIG 152 (H) 11/01/2018       Lab Results   Component Value Date    ALT 86 (H) 11/01/2018    AST 59 (H) 11/01/2018    GGT 32 09/19/2018    ALKPHOS 101 11/01/2018    BILITOT 0 50 07/26/2015       Lab Results   Component Value Date    FREET4 0 83 12/19/2017       Recent Results (from the past 28482 hour(s))   Hemoglobin A1c    Collection Time: 12/19/17 12:26 PM   Result Value Ref Range    Hemoglobin A1C 8 3 (H) 4 2 - 6 3 %     mg/dl   Microalbumin / creatinine urine ratio    Collection Time: 12/19/17 12:26 PM   Result Value Ref Range    Creatinine, Ur 53 4 mg/dL    Microalbum  ,U,Random 37 3 (H) 0 0 - 20 0 mg/L    Microalb Creat Ratio 70 (H) 0 - 30 mg/g creatinine   Basic metabolic panel    Collection Time: 12/19/17 12:26 PM   Result Value Ref Range    Sodium 139 136 - 145 mmol/L    Potassium 4 2 3 5 - 5 3 mmol/L    Chloride 105 100 - 108 mmol/L    CO2 31 21 - 32 mmol/L    ANION GAP 3 (L) 4 - 13 mmol/L    BUN 21 5 - 25 mg/dL    Creatinine 1 43 (H) 0 60 - 1 30 mg/dL    Glucose 71 65 - 140 mg/dL    Calcium 8 7 8 3 - 10 1 mg/dL    eGFR 55 ml/min/1 73sq m   CBC and differential    Collection Time: 12/19/17 12:26 PM   Result Value Ref Range    WBC 8 25 4 31 - 10 16 Thousand/uL    RBC 4 23 3 88 - 5 62 Million/uL    Hemoglobin 13 4 12 0 - 17 0 g/dL    Hematocrit 38 6 36 5 - 49 3 %    MCV 91 82 - 98 fL    MCH 31 7 26 8 - 34 3 pg    MCHC 34 7 31 4 - 37 4 g/dL    RDW 13 4 11 6 - 15 1 %    MPV 11 4 8 9 - 12 7 fL    Platelets 419 766 - 900 Thousands/uL    nRBC 0 /100 WBCs    Neutrophils Relative 59 43 - 75 %    Lymphocytes Relative 31 14 - 44 %    Monocytes Relative 7 4 - 12 %    Eosinophils Relative 3 0 - 6 %    Basophils Relative 0 0 - 1 %    Neutrophils Absolute 4 90 1 85 - 7 62 Thousands/µL    Lymphocytes Absolute 2 54 0 60 - 4 47 Thousands/µL    Monocytes Absolute 0 55 0 17 - 1 22 Thousand/µL    Eosinophils Absolute 0 22 0 00 - 0 61 Thousand/µL    Basophils Absolute 0 02 0 00 - 0 10 Thousands/µL   T4, free    Collection Time: 12/19/17 12:26 PM   Result Value Ref Range    Free T4 0 83 0 76 - 1 46 ng/dL   TSH, 3rd generation    Collection Time: 12/19/17 12:26 PM   Result Value Ref Range    TSH 3RD GENERATON 1 820 0 358 - 3 740 uIU/mL   Magnesium    Collection Time: 12/19/17 12:26 PM   Result Value Ref Range    Magnesium 2 0 1 6 - 2 6 mg/dL   Vitamin D 25 hydroxy    Collection Time: 12/19/17 12:26 PM   Result Value Ref Range    Vit D, 25-Hydroxy 45 0 30 0 - 100 0 ng/mL   HEMOGLOBIN A1C W/ EAG ESTIMATION    Collection Time: 04/11/18 10:27 AM   Result Value Ref Range    Hemoglobin A1C 9 3 (H) 4 2 - 6 3 %     mg/dl   Lipid panel    Collection Time: 04/20/18 12:00 AM   Result Value Ref Range    Triglycerides 230 (A) 150 mg/dL    Cholesterol 137 50 - 200 mg/dL    HDL, Direct 33 (A) 40 - 60 mg/dL    LDL Cholesterol 59 3    HEMOGLOBIN A1C W/ EAG ESTIMATION    Collection Time: 04/20/18 12:00 AM   Result Value Ref Range    Hemoglobin A1C 9 2 (A) 4 2 - 6 3 %   Microalbumin / creatinine urine ratio    Collection Time: 08/28/18 11:51 AM   Result Value Ref Range    Creatinine, Ur 199 0 mg/dL    Microalbum  ,U,Random 338 0 (H) 0 0 - 20 0 mg/L    Microalb Creat Ratio 170 (H) 0 - 30 mg/g creatinine   Basic metabolic panel    Collection Time: 08/28/18 11:51 AM   Result Value Ref Range    Sodium 137 136 - 145 mmol/L    Potassium 4 3 3 5 - 5 3 mmol/L    Chloride 101 100 - 108 mmol/L    CO2 28 21 - 32 mmol/L    ANION GAP 8 4 - 13 mmol/L    BUN 26 (H) 5 - 25 mg/dL    Creatinine 1 59 (H) 0 60 - 1 30 mg/dL    Glucose 181 (H) 65 - 140 mg/dL    Calcium 9 3 8 3 - 10 1 mg/dL    eGFR 48 ml/min/1 73sq m Comprehensive metabolic panel    Collection Time: 09/19/18  9:42 AM   Result Value Ref Range    Sodium 139 136 - 145 mmol/L    Potassium 4 5 3 5 - 5 3 mmol/L    Chloride 105 100 - 108 mmol/L    CO2 27 21 - 32 mmol/L    ANION GAP 7 4 - 13 mmol/L    BUN 20 5 - 25 mg/dL    Creatinine 1 57 (H) 0 60 - 1 30 mg/dL    Glucose, Fasting 106 (H) 65 - 99 mg/dL    Calcium 8 9 8 3 - 10 1 mg/dL    AST 28 5 - 45 U/L    ALT 47 12 - 78 U/L    Alkaline Phosphatase 90 46 - 116 U/L    Total Protein 7 5 6 4 - 8 2 g/dL    Albumin 3 6 3 5 - 5 0 g/dL    Total Bilirubin 0 53 0 20 - 1 00 mg/dL    eGFR 49 ml/min/1 73sq m   CBC and differential    Collection Time: 09/19/18  9:42 AM   Result Value Ref Range    WBC 7 98 4 31 - 10 16 Thousand/uL    RBC 3 96 3 88 - 5 62 Million/uL    Hemoglobin 12 4 12 0 - 17 0 g/dL    Hematocrit 37 0 36 5 - 49 3 %    MCV 93 82 - 98 fL    MCH 31 3 26 8 - 34 3 pg    MCHC 33 5 31 4 - 37 4 g/dL    RDW 13 0 11 6 - 15 1 %    MPV 10 7 8 9 - 12 7 fL    Platelets 357 859 - 213 Thousands/uL    nRBC 0 /100 WBCs    Neutrophils Relative 57 43 - 75 %    Immat GRANS % 0 0 - 2 %    Lymphocytes Relative 33 14 - 44 %    Monocytes Relative 7 4 - 12 %    Eosinophils Relative 3 0 - 6 %    Basophils Relative 0 0 - 1 %    Neutrophils Absolute 4 47 1 85 - 7 62 Thousands/µL    Immature Grans Absolute 0 02 0 00 - 0 20 Thousand/uL    Lymphocytes Absolute 2 65 0 60 - 4 47 Thousands/µL    Monocytes Absolute 0 56 0 17 - 1 22 Thousand/µL    Eosinophils Absolute 0 25 0 00 - 0 61 Thousand/µL    Basophils Absolute 0 03 0 00 - 0 10 Thousands/µL   Gamma GT    Collection Time: 09/19/18  9:42 AM   Result Value Ref Range    GGT 32 5 - 85 U/L   Magnesium    Collection Time: 09/19/18  9:42 AM   Result Value Ref Range    Magnesium 2 1 1 6 - 2 6 mg/dL   APTT    Collection Time: 09/19/18  9:42 AM   Result Value Ref Range    PTT 30 24 - 36 seconds   Protime-INR    Collection Time: 09/19/18  9:42 AM   Result Value Ref Range    Protime 13 1 11 8 - 14 2 seconds    INR 0 98 0 86 - 1 17   Ferritin    Collection Time: 09/19/18  9:42 AM   Result Value Ref Range    Ferritin 73 8 - 388 ng/mL   POCT hemoglobin A1c    Collection Time: 09/20/18  8:59 AM   Result Value Ref Range    Hemoglobin A1C 7 6    Comprehensive metabolic panel Lab Collect    Collection Time: 11/01/18  7:30 AM   Result Value Ref Range    Sodium 136 136 - 145 mmol/L    Potassium 4 6 3 5 - 5 3 mmol/L    Chloride 104 100 - 108 mmol/L    CO2 30 21 - 32 mmol/L    ANION GAP 2 (L) 4 - 13 mmol/L    BUN 27 (H) 5 - 25 mg/dL    Creatinine 1 44 (H) 0 60 - 1 30 mg/dL    Glucose, Fasting 80 65 - 99 mg/dL    Calcium 8 7 8 3 - 10 1 mg/dL    AST 59 (H) 5 - 45 U/L    ALT 86 (H) 12 - 78 U/L    Alkaline Phosphatase 101 46 - 116 U/L    Total Protein 7 1 6 4 - 8 2 g/dL    Albumin 3 4 (L) 3 5 - 5 0 g/dL    Total Bilirubin 0 38 0 20 - 1 00 mg/dL    eGFR 55 ml/min/1 73sq m   HEMOGLOBIN A1C W/ EAG ESTIMATION Lab Collect    Collection Time: 11/01/18  7:30 AM   Result Value Ref Range    Hemoglobin A1C 8 4 (H) 4 2 - 6 3 %     mg/dl   TSH, 3rd generation Lab Collect    Collection Time: 11/01/18  7:30 AM   Result Value Ref Range    TSH 3RD GENERATON 2 260 0 358 - 3 740 uIU/mL   Microalbumin / creatinine urine ratio- Lab Collect    Collection Time: 11/01/18  7:30 AM   Result Value Ref Range    Creatinine, Ur 129 0 mg/dL    Microalbum  ,U,Random 369 0 (H) 0 0 - 20 0 mg/L    Microalb Creat Ratio 286 (H) 0 - 30 mg/g creatinine   Lipid Panel with Direct LDL reflex Lab Collect    Collection Time: 11/01/18  7:30 AM   Result Value Ref Range    Cholesterol 144 50 - 200 mg/dL    Triglycerides 152 (H) <=150 mg/dL    HDL, Direct 36 (L) 40 - 60 mg/dL    LDL Calculated 78 0 - 100 mg/dL   CBC and differential- Lab Collect    Collection Time: 11/01/18  7:30 AM   Result Value Ref Range    WBC 7 63 4 31 - 10 16 Thousand/uL    RBC 4 29 3 88 - 5 62 Million/uL    Hemoglobin 13 2 12 0 - 17 0 g/dL    Hematocrit 40 3 36 5 - 49 3 %    MCV 94 82 - 98 fL MCH 30 8 26 8 - 34 3 pg    MCHC 32 8 31 4 - 37 4 g/dL    RDW 13 2 11 6 - 15 1 %    MPV 11 2 8 9 - 12 7 fL    Platelets 108 379 - 477 Thousands/uL    nRBC 0 /100 WBCs    Neutrophils Relative 51 43 - 75 %    Immat GRANS % 0 0 - 2 %    Lymphocytes Relative 38 14 - 44 %    Monocytes Relative 8 4 - 12 %    Eosinophils Relative 2 0 - 6 %    Basophils Relative 1 0 - 1 %    Neutrophils Absolute 3 88 1 85 - 7 62 Thousands/µL    Immature Grans Absolute 0 03 0 00 - 0 20 Thousand/uL    Lymphocytes Absolute 2 92 0 60 - 4 47 Thousands/µL    Monocytes Absolute 0 59 0 17 - 1 22 Thousand/µL    Eosinophils Absolute 0 17 0 00 - 0 61 Thousand/µL    Basophils Absolute 0 04 0 00 - 0 10 Thousands/µL         Future Appointments  Date Time Provider Anisa Cheri   2/7/2019 8:00 AM Livia Wynne MD [facility]       Portions of the record may have been created with voice recognition software  Occasional wrong word or "sound a like" substitutions may have occurred due to the inherent limitations of voice recognition software  Read the chart carefully and recognize, using context, where substitutions have occurred

## 2018-11-06 NOTE — LETTER
November 6, 2018     Angela Doherty MD  [address]    Patient: Oneil Habermann   YOB: 1945   Date of Visit: 11/6/2018       Dear Dr Shireen Gonzalez: Thank you for referring Eden Stahl to me for evaluation  Below are my notes for this consultation  If you have questions, please do not hesitate to call me  I look forward to following your patient along with you  Sincerely,        Justine Lee DO        CC: No Recipients  Justine Lee DO  11/6/2018 10:07 AM  Sign at close encounter  11/6/2018    Assessment/Plan      Diagnoses and all orders for this visit:    Type 2 diabetes mellitus with stage 3 chronic kidney disease, with long-term current use of insulin (HCC)  -     Blood Glucose Monitoring Suppl (ACCU-CHEK RITA PLUS) w/Device KIT; Use as directed  -     HEMOGLOBIN A1C W/ EAG ESTIMATION Lab Collect; Future  -     Comprehensive metabolic panel Lab Collect; Future    Essential hypertension    Hyperlipidemia, unspecified hyperlipidemia type    Other orders  -     Insulin Pen Needle 31G X 8 MM MISC; BD Ultra-Fine Short Pen Needle 31 gauge x 5/16"        Assessment/Plan:  1  Type 2 diabetes with peripheral neuropathy:  A1c is improved but still slightly above goal   I have asked the patient to send in blood sugars in about 2 weeks for review on current regimen  We will plan to continue making changes to his regimen with a goal A1c of under 8 by the time I see him next would be in 4 months  He is up-to-date in eye and foot care as he follows with Ophthalmology and Podiatry regularly  I did discuss that given that he is checking his sugar at all times per day as well as fluctuating sugars and intensive insulin regimen he may benefit from personal C GM in the future  2   Hypertension:  Continue current regimen      3   Hyperlipidemia:  Reportedly has had intolerance or side effects to stay multiple statins in the past   Cholesterol numbers overall look okay  He does follow with Cardiology  CC: Diabetes Consult    History of Present Illness     HPI: Amanda Ypi is a 74y o  year old male with type 2 diabetes for 24 years  He is on oral agents and insulin at home and takes NovoLog 15 units with meals, metformin 500 mg with breakfast, Lantus 37 units at bedtime  He denies any polyuria, polydipsia, nocturia and blurry vision  He denies nephropathy and retinopathy but does admit to neuropathy  Hypoglycemic episodes: No not recently  The patient's last eye exam was last week with good results  The patient's last foot exam was in July 2018 at his podiatrist     Blood Sugar/Glucometer/Pump/CGM review: No logs today but he will send some in  For hypertension he takes isosorbide  He is on Lasix as well as tamsulosin through other providers  He is not currently on any medicine for cholesterol  Review of Systems   Constitutional: Negative for fatigue  HENT: Negative for trouble swallowing and voice change  Eyes: Negative for visual disturbance  Respiratory: Negative for shortness of breath  Cardiovascular: Negative for palpitations and leg swelling  Gastrointestinal: Negative for abdominal pain, nausea and vomiting  Endocrine: Negative for cold intolerance, heat intolerance, polydipsia and polyuria  Musculoskeletal: Negative for arthralgias and myalgias  Skin: Negative for rash  Neurological: Negative for dizziness, tremors and weakness  Hematological: Negative for adenopathy  Psychiatric/Behavioral: Negative for agitation and confusion         Historical Information   Past Medical History:   Diagnosis Date    Anemia     Last assessed: 9/28/17    Arteriosclerotic cardiovascular disease     Last assessed: 9/28/17    Bladder cancer ([facility] )     Cardiac disease     Diabetes mellitus ([facility] )     Hypertension     Hypotension     Last assessed: 2/24/15    Insomnia     Last assessed: 11/14/12    Other seasonal allergic rhinitis     Last assessed: 2/10/16    Transient cerebral ischemia      Past Surgical History:   Procedure Laterality Date    CARDIAC SURGERY      Cath stent placement  Last assessed: 3/9/17  Interventional Catheterization    CHOLECYSTECTOMY      CYSTOSCOPY      Diagnostic w/biopsy  Alcira Oliver  Last assessed: 12/1/14    CYSTOURETHROSCOPY      w/cautery  Alcira Oliver    GASTRIC BYPASS      For morbid obesity w/Shaji-en-Y  Resolved: 11/17/09    INCISION AND DRAINAGE OF WOUND Right 2/26/2017    Procedure: INCISION AND DRAINAGE (I&D) EXTREMITY WITH APPLICATION OF GRAFT JACKET;  Surgeon: Ray Almanzar DPM;  [facility];  Service:     INCISION AND DRAINAGE OF WOUND Right 4/25/2017    Procedure: INCISION AND DRAINAGE (I&D) EXTREMITY, APPLICATION OF GRAFT;  Surgeon: Ray Almanzar DPM;  [facility];  Service:     JOINT REPLACEMENT      Knee   Last assessed: 1/28/11    CA CYSTOURETHROSCOPY,BIOPSY N/A 8/16/2016    Procedure: Bandar Sandoval;  Surgeon: Liberty Varner MD;  [facility];  Service: Urology    [address]      Surgery Shaji-en-Y    VAC DRESSING APPLICATION Right 0/92/6425    Procedure: APPLICATION VAC DRESSING;  Surgeon: Ray Amlanzar DPM;  [facility];  Service:      Social History   History   Alcohol Use No     History   Drug Use No     History   Smoking Status    Former Smoker   Smokeless Tobacco    Never Used     Family History:   Family History   Problem Relation Age of Onset    Diabetes Mother     Heart disease Mother     Other Mother         High blood pressure    Heart disease Father     Diabetes Sister     Other Sister         High blood pressure    Kidney disease Sister     Heart disease Brother     Other Brother         High blood pressure       Meds/Allergies   Current Outpatient Prescriptions   Medication Sig Dispense Refill    aspirin 81 MG tablet Take 81 mg by mouth daily   Azelastine HCl 0 15 % SOLN Inhale 1 spray 2 (two) times a day  0    benzonatate (TESSALON) 200 MG capsule Take 1 capsule (200 mg total) by mouth 3 (three) times a day as needed for cough 20 capsule 0    bimatoprost (LUMIGAN) 0 01 % ophthalmic drops 1 drop daily at bedtime   bismuth subsalicylate (PEPTO BISMOL) 262 MG chewable tablet Chew 524 mg daily as needed for indigestion   Cholecalciferol (VITAMIN D3) 15384 units CAPS TAKE 3 CAPSULES PER MONTH 12 capsule 0    escitalopram (LEXAPRO) 20 mg tablet 1/d      fluocinonide (LIDEX) 0 05 % cream Apply topically 2 (two) times a day   fluticasone (FLONASE) 50 mcg/act nasal spray 1 spray into each nostril daily 16 g 3    gabapentin (NEURONTIN) 300 mg capsule Take 2 capsules at bedtime 180 capsule 0    insulin glargine (LANTUS SOLOSTAR) 100 units/mL injection pen 37 units bedtime 45 mL 0    Insulin Pen Needle 31G X 8 MM MISC BD Ultra-Fine Short Pen Needle 31 gauge x 5/16"      isosorbide mononitrate (IMDUR) 30 mg 24 hr tablet Take 1 tablet (30 mg total) by mouth daily for 90 days 90 tablet 1    metFORMIN (GLUCOPHAGE) 1000 MG tablet Take 0 5 tablets (500 mg total) by mouth daily with breakfast 90 tablet 0    metoprolol succinate (TOPROL-XL) 100 mg 24 hr tablet Take 100 mg by mouth daily   Mirabegron ER 50 MG TB24 Take 50 mg by mouth      multivitamin (THERAGRAN) TABS Take 1 tablet by mouth daily        NOVOLOG FLEXPEN 100 units/mL injection pen Inject 15 Units under the skin 3 (three) times a day with meals 15 pen 0    omeprazole (PriLOSEC) 20 mg delayed release capsule TAKE 1 CAPSULE DAILY 90 capsule 1    tamsulosin (FLOMAX) 0 4 mg Take 1 capsule (0 4 mg total) by mouth daily with dinner for 90 days 90 capsule 1    ascorbic Acid (VITAMIN C) 500 MG CPCR Take 1 capsule (500 mg total) by mouth 2 (two) times a day (Patient not taking: Reported on 11/6/2018 ) 60 capsule 1    B Complex-C-E-Zn (MYBEC PO) Take by mouth      Blood Glucose Monitoring Suppl (ACCU-CHEK RITA PLUS) w/Device KIT Use as directed  1 kit 0    esomeprazole (NEXIUM) 20 mg capsule 1/d      folic acid (FOLVITE) 1 mg tablet Take 1 tablet (1 mg total) by mouth daily (Patient not taking: Reported on 11/6/2018 ) 30 tablet 1    furosemide (LASIX) 20 mg tablet Take 1 tablet (20 mg total) by mouth daily for 90 days Take 1 tablet daily 90 tablet 1    loperamide (IMODIUM) 2 mg capsule Take by mouth      mirtazapine (REMERON) 15 mg tablet       mupirocin (BACTROBAN) 2 % nasal ointment        No current facility-administered medications for this visit  Allergies   Allergen Reactions    Atorvastatin Hives, Other (See Comments) and Itching     Pt  Reports "Hives"  Lipitor and Simvastatin    Simvastatin Rash     Other reaction(s): Edema, Other Reaction  "ALL STATINS"    Insulin Lispro Edema    Statins Itching    Other Other (See Comments), Itching and Rash     rash to electrodes  rash to electrodes and adhesives       Objective   Vitals: Blood pressure 118/78, pulse 80, height 6' 4" (1 93 m), weight 112 kg (246 lb 3 2 oz)  Invasive Devices     Peripherally Inserted Central Catheter Line            PICC Line 13/88/25 Basilic 579 days          Drain            Negative Pressure Wound Therapy (V A C ) Foot Right 618 days                Physical Exam   Constitutional: He is oriented to person, place, and time  He appears well-developed and well-nourished  No distress  HENT:   Head: Normocephalic and atraumatic  Eyes: Pupils are equal, round, and reactive to light  Conjunctivae are normal    Neck: Normal range of motion  Neck supple  No thyromegaly present  Cardiovascular: Normal rate and regular rhythm  Pulmonary/Chest: Effort normal and breath sounds normal  No respiratory distress  Abdominal: Soft  Bowel sounds are normal  He exhibits no distension  Musculoskeletal: Normal range of motion  He exhibits no edema     Neurological: He is alert and oriented to person, place, and time  He exhibits normal muscle tone  Skin: Skin is warm and dry  No rash noted  He is not diaphoretic  Psychiatric: He has a normal mood and affect  His behavior is normal        The history was obtained from the review of the chart and from the patient  Lab Results:    Most recent Al[facility] is  Lab Results   Component Value Date    HGBA1C 8 4 (H) 11/01/2018           No components found for: HA1C  No components found for: GLU    Lab Results   Component Value Date    CREATININE 1 44 (H) 11/01/2018    CREATININE 1 57 (H) 09/19/2018    CREATININE 1 59 (H) 08/28/2018    BUN 27 (H) 11/01/2018     09/03/2015    K 4 6 11/01/2018     11/01/2018    CO2 30 11/01/2018     eGFR   Date Value Ref Range Status   11/01/2018 55 ml/min/1 73sq m Final     No components found for: [facility]    Lab Results   Component Value Date    CHOL 139 03/17/2015    HDL 36 (L) 11/01/2018    TRIG 152 (H) 11/01/2018       Lab Results   Component Value Date    ALT 86 (H) 11/01/2018    AST 59 (H) 11/01/2018    GGT 32 09/19/2018    ALKPHOS 101 11/01/2018    BILITOT 0 50 07/26/2015       Lab Results   Component Value Date    FREET4 0 83 12/19/2017       Recent Results (from the past 04134 hour(s))   Hemoglobin A1c    Collection Time: 12/19/17 12:26 PM   Result Value Ref Range    Hemoglobin A1C 8 3 (H) 4 2 - 6 3 %     mg/dl   Microalbumin / creatinine urine ratio    Collection Time: 12/19/17 12:26 PM   Result Value Ref Range    Creatinine, Ur 53 4 mg/dL    Microalbum  ,U,Random 37 3 (H) 0 0 - 20 0 mg/L    Microalb Creat Ratio 70 (H) 0 - 30 mg/g creatinine   Basic metabolic panel    Collection Time: 12/19/17 12:26 PM   Result Value Ref Range    Sodium 139 136 - 145 mmol/L    Potassium 4 2 3 5 - 5 3 mmol/L    Chloride 105 100 - 108 mmol/L    CO2 31 21 - 32 mmol/L    ANION GAP 3 (L) 4 - 13 mmol/L    BUN 21 5 - 25 mg/dL    Creatinine 1 43 (H) 0 60 - 1 30 mg/dL    Glucose 71 65 - 140 mg/dL    Calcium 8 7 8 3 - 10 1 mg/dL    eGFR 55 ml/min/1 73sq m   CBC and differential    Collection Time: 12/19/17 12:26 PM   Result Value Ref Range    WBC 8 25 4 31 - 10 16 Thousand/uL    RBC 4 23 3 88 - 5 62 Million/uL    Hemoglobin 13 4 12 0 - 17 0 g/dL    Hematocrit 38 6 36 5 - 49 3 %    MCV 91 82 - 98 fL    MCH 31 7 26 8 - 34 3 pg    MCHC 34 7 31 4 - 37 4 g/dL    RDW 13 4 11 6 - 15 1 %    MPV 11 4 8 9 - 12 7 fL    Platelets 940 079 - 569 Thousands/uL    nRBC 0 /100 WBCs    Neutrophils Relative 59 43 - 75 %    Lymphocytes Relative 31 14 - 44 %    Monocytes Relative 7 4 - 12 %    Eosinophils Relative 3 0 - 6 %    Basophils Relative 0 0 - 1 %    Neutrophils Absolute 4 90 1 85 - 7 62 Thousands/µL    Lymphocytes Absolute 2 54 0 60 - 4 47 Thousands/µL    Monocytes Absolute 0 55 0 17 - 1 22 Thousand/µL    Eosinophils Absolute 0 22 0 00 - 0 61 Thousand/µL    Basophils Absolute 0 02 0 00 - 0 10 Thousands/µL   T4, free    Collection Time: 12/19/17 12:26 PM   Result Value Ref Range    Free T4 0 83 0 76 - 1 46 ng/dL   TSH, 3rd generation    Collection Time: 12/19/17 12:26 PM   Result Value Ref Range    TSH 3RD GENERATON 1 820 0 358 - 3 740 uIU/mL   Magnesium    Collection Time: 12/19/17 12:26 PM   Result Value Ref Range    Magnesium 2 0 1 6 - 2 6 mg/dL   Vitamin D 25 hydroxy    Collection Time: 12/19/17 12:26 PM   Result Value Ref Range    Vit D, 25-Hydroxy 45 0 30 0 - 100 0 ng/mL   HEMOGLOBIN A1C W/ EAG ESTIMATION    Collection Time: 04/11/18 10:27 AM   Result Value Ref Range    Hemoglobin A1C 9 3 (H) 4 2 - 6 3 %     mg/dl   Lipid panel    Collection Time: 04/20/18 12:00 AM   Result Value Ref Range    Triglycerides 230 (A) 150 mg/dL    Cholesterol 137 50 - 200 mg/dL    HDL, Direct 33 (A) 40 - 60 mg/dL    LDL Cholesterol 59 3    HEMOGLOBIN A1C W/ EAG ESTIMATION    Collection Time: 04/20/18 12:00 AM   Result Value Ref Range    Hemoglobin A1C 9 2 (A) 4 2 - 6 3 %   Microalbumin / creatinine urine ratio    Collection Time: 08/28/18 11:51 AM   Result Value Ref Range    Creatinine, Ur 199 0 mg/dL    Microalbum  ,U,Random 338 0 (H) 0 0 - 20 0 mg/L    Microalb Creat Ratio 170 (H) 0 - 30 mg/g creatinine   Basic metabolic panel    Collection Time: 08/28/18 11:51 AM   Result Value Ref Range    Sodium 137 136 - 145 mmol/L    Potassium 4 3 3 5 - 5 3 mmol/L    Chloride 101 100 - 108 mmol/L    CO2 28 21 - 32 mmol/L    ANION GAP 8 4 - 13 mmol/L    BUN 26 (H) 5 - 25 mg/dL    Creatinine 1 59 (H) 0 60 - 1 30 mg/dL    Glucose 181 (H) 65 - 140 mg/dL    Calcium 9 3 8 3 - 10 1 mg/dL    eGFR 48 ml/min/1 73sq m   Comprehensive metabolic panel    Collection Time: 09/19/18  9:42 AM   Result Value Ref Range    Sodium 139 136 - 145 mmol/L    Potassium 4 5 3 5 - 5 3 mmol/L    Chloride 105 100 - 108 mmol/L    CO2 27 21 - 32 mmol/L    ANION GAP 7 4 - 13 mmol/L    BUN 20 5 - 25 mg/dL    Creatinine 1 57 (H) 0 60 - 1 30 mg/dL    Glucose, Fasting 106 (H) 65 - 99 mg/dL    Calcium 8 9 8 3 - 10 1 mg/dL    AST 28 5 - 45 U/L    ALT 47 12 - 78 U/L    Alkaline Phosphatase 90 46 - 116 U/L    Total Protein 7 5 6 4 - 8 2 g/dL    Albumin 3 6 3 5 - 5 0 g/dL    Total Bilirubin 0 53 0 20 - 1 00 mg/dL    eGFR 49 ml/min/1 73sq m   CBC and differential    Collection Time: 09/19/18  9:42 AM   Result Value Ref Range    WBC 7 98 4 31 - 10 16 Thousand/uL    RBC 3 96 3 88 - 5 62 Million/uL    Hemoglobin 12 4 12 0 - 17 0 g/dL    Hematocrit 37 0 36 5 - 49 3 %    MCV 93 82 - 98 fL    MCH 31 3 26 8 - 34 3 pg    MCHC 33 5 31 4 - 37 4 g/dL    RDW 13 0 11 6 - 15 1 %    MPV 10 7 8 9 - 12 7 fL    Platelets 329 595 - 238 Thousands/uL    nRBC 0 /100 WBCs    Neutrophils Relative 57 43 - 75 %    Immat GRANS % 0 0 - 2 %    Lymphocytes Relative 33 14 - 44 %    Monocytes Relative 7 4 - 12 %    Eosinophils Relative 3 0 - 6 %    Basophils Relative 0 0 - 1 %    Neutrophils Absolute 4 47 1 85 - 7 62 Thousands/µL    Immature Grans Absolute 0 02 0 00 - 0 20 Thousand/uL    Lymphocytes Absolute 2 65 0 60 - 4 47 Thousands/µL    Monocytes Absolute 0 56 0 17 - 1 22 Thousand/µL    Eosinophils Absolute 0 25 0 00 - 0 61 Thousand/µL    Basophils Absolute 0 03 0 00 - 0 10 Thousands/µL   Gamma GT    Collection Time: 09/19/18  9:42 AM   Result Value Ref Range    GGT 32 5 - 85 U/L   Magnesium    Collection Time: 09/19/18  9:42 AM   Result Value Ref Range    Magnesium 2 1 1 6 - 2 6 mg/dL   APTT    Collection Time: 09/19/18  9:42 AM   Result Value Ref Range    PTT 30 24 - 36 seconds   Protime-INR    Collection Time: 09/19/18  9:42 AM   Result Value Ref Range    Protime 13 1 11 8 - 14 2 seconds    INR 0 98 0 86 - 1 17   Ferritin    Collection Time: 09/19/18  9:42 AM   Result Value Ref Range    Ferritin 73 8 - 388 ng/mL   POCT hemoglobin A1c    Collection Time: 09/20/18  8:59 AM   Result Value Ref Range    Hemoglobin A1C 7 6    Comprehensive metabolic panel Lab Collect    Collection Time: 11/01/18  7:30 AM   Result Value Ref Range    Sodium 136 136 - 145 mmol/L    Potassium 4 6 3 5 - 5 3 mmol/L    Chloride 104 100 - 108 mmol/L    CO2 30 21 - 32 mmol/L    ANION GAP 2 (L) 4 - 13 mmol/L    BUN 27 (H) 5 - 25 mg/dL    Creatinine 1 44 (H) 0 60 - 1 30 mg/dL    Glucose, Fasting 80 65 - 99 mg/dL    Calcium 8 7 8 3 - 10 1 mg/dL    AST 59 (H) 5 - 45 U/L    ALT 86 (H) 12 - 78 U/L    Alkaline Phosphatase 101 46 - 116 U/L    Total Protein 7 1 6 4 - 8 2 g/dL    Albumin 3 4 (L) 3 5 - 5 0 g/dL    Total Bilirubin 0 38 0 20 - 1 00 mg/dL    eGFR 55 ml/min/1 73sq m   HEMOGLOBIN A1C W/ EAG ESTIMATION Lab Collect    Collection Time: 11/01/18  7:30 AM   Result Value Ref Range    Hemoglobin A1C 8 4 (H) 4 2 - 6 3 %     mg/dl   TSH, 3rd generation Lab Collect    Collection Time: 11/01/18  7:30 AM   Result Value Ref Range    TSH 3RD GENERATON 2 260 0 358 - 3 740 uIU/mL   Microalbumin / creatinine urine ratio- Lab Collect    Collection Time: 11/01/18  7:30 AM   Result Value Ref Range    Creatinine, Ur 129 0 mg/dL    Microalbum  ,U,Random 369 0 (H) 0 0 - 20 0 mg/L    Microalb Creat Ratio 286 (H) 0 - 30 mg/g creatinine   Lipid Panel with Direct LDL reflex Lab Collect    Collection Time: 11/01/18  7:30 AM   Result Value Ref Range    Cholesterol 144 50 - 200 mg/dL    Triglycerides 152 (H) <=150 mg/dL    HDL, Direct 36 (L) 40 - 60 mg/dL    LDL Calculated 78 0 - 100 mg/dL   CBC and differential- Lab Collect    Collection Time: 11/01/18  7:30 AM   Result Value Ref Range    WBC 7 63 4 31 - 10 16 Thousand/uL    RBC 4 29 3 88 - 5 62 Million/uL    Hemoglobin 13 2 12 0 - 17 0 g/dL    Hematocrit 40 3 36 5 - 49 3 %    MCV 94 82 - 98 fL    MCH 30 8 26 8 - 34 3 pg    MCHC 32 8 31 4 - 37 4 g/dL    RDW 13 2 11 6 - 15 1 %    MPV 11 2 8 9 - 12 7 fL    Platelets 547 077 - 991 Thousands/uL    nRBC 0 /100 WBCs    Neutrophils Relative 51 43 - 75 %    Immat GRANS % 0 0 - 2 %    Lymphocytes Relative 38 14 - 44 %    Monocytes Relative 8 4 - 12 %    Eosinophils Relative 2 0 - 6 %    Basophils Relative 1 0 - 1 %    Neutrophils Absolute 3 88 1 85 - 7 62 Thousands/µL    Immature Grans Absolute 0 03 0 00 - 0 20 Thousand/uL    Lymphocytes Absolute 2 92 0 60 - 4 47 Thousands/µL    Monocytes Absolute 0 59 0 17 - 1 22 Thousand/µL    Eosinophils Absolute 0 17 0 00 - 0 61 Thousand/µL    Basophils Absolute 0 04 0 00 - 0 10 Thousands/µL         Future Appointments  Date Time Provider [facility]   2/7/2019 8:00 AM Adam Patton MD [facility]       Portions of the record may have been created with voice recognition software  Occasional wrong word or "sound a like" substitutions may have occurred due to the inherent limitations of voice recognition software  Read the chart carefully and recognize, using context, where substitutions have occurred

## 2018-11-21 ENCOUNTER — APPOINTMENT (OUTPATIENT)
Dept: LAB | Facility: CLINIC | Age: 73
End: 2018-11-21
Payer: MEDICARE

## 2018-11-21 ENCOUNTER — OFFICE VISIT (OUTPATIENT)
Dept: INTERNAL MEDICINE CLINIC | Facility: CLINIC | Age: 73
End: 2018-11-21
Payer: MEDICARE

## 2018-11-21 VITALS
TEMPERATURE: 95.2 F | DIASTOLIC BLOOD PRESSURE: 84 MMHG | BODY MASS INDEX: 30.44 KG/M2 | SYSTOLIC BLOOD PRESSURE: 132 MMHG | WEIGHT: 250 LBS | HEART RATE: 80 BPM | RESPIRATION RATE: 15 BRPM | HEIGHT: 76 IN

## 2018-11-21 DIAGNOSIS — E11.22 TYPE 2 DIABETES MELLITUS WITH STAGE 3 CHRONIC KIDNEY DISEASE, WITH LONG-TERM CURRENT USE OF INSULIN (HCC): ICD-10-CM

## 2018-11-21 DIAGNOSIS — N18.30 TYPE 2 DIABETES MELLITUS WITH STAGE 3 CHRONIC KIDNEY DISEASE, WITH LONG-TERM CURRENT USE OF INSULIN (HCC): ICD-10-CM

## 2018-11-21 DIAGNOSIS — I25.10 CORONARY ARTERY DISEASE INVOLVING NATIVE HEART WITHOUT ANGINA PECTORIS, UNSPECIFIED VESSEL OR LESION TYPE: ICD-10-CM

## 2018-11-21 DIAGNOSIS — Z79.4 TYPE 2 DIABETES MELLITUS WITH STAGE 3 CHRONIC KIDNEY DISEASE, WITH LONG-TERM CURRENT USE OF INSULIN (HCC): ICD-10-CM

## 2018-11-21 DIAGNOSIS — N30.00 ACUTE CYSTITIS WITHOUT HEMATURIA: Primary | ICD-10-CM

## 2018-11-21 DIAGNOSIS — I10 ESSENTIAL HYPERTENSION: ICD-10-CM

## 2018-11-21 PROBLEM — M25.062 KNEE HEMARTHROSIS, LEFT: Status: RESOLVED | Noted: 2018-02-15 | Resolved: 2018-11-21

## 2018-11-21 PROBLEM — M79.671 RIGHT FOOT PAIN: Status: RESOLVED | Noted: 2017-04-21 | Resolved: 2018-11-21

## 2018-11-21 PROBLEM — L03.115 CELLULITIS OF RIGHT FOOT: Status: RESOLVED | Noted: 2017-04-21 | Resolved: 2018-11-21

## 2018-11-21 LAB
BACTERIA UR QL AUTO: ABNORMAL /HPF
BILIRUB UR QL STRIP: NEGATIVE
CLARITY UR: CLEAR
COLOR UR: YELLOW
GLUCOSE UR STRIP-MCNC: ABNORMAL MG/DL
HGB UR QL STRIP.AUTO: ABNORMAL
HYALINE CASTS #/AREA URNS LPF: ABNORMAL /LPF
KETONES UR STRIP-MCNC: NEGATIVE MG/DL
LEUKOCYTE ESTERASE UR QL STRIP: ABNORMAL
NITRITE UR QL STRIP: NEGATIVE
NON-SQ EPI CELLS URNS QL MICRO: ABNORMAL /HPF
PH UR STRIP.AUTO: 5.5 [PH] (ref 4.5–8)
PROT UR STRIP-MCNC: ABNORMAL MG/DL
RBC #/AREA URNS AUTO: ABNORMAL /HPF
SP GR UR STRIP.AUTO: 1.02 (ref 1–1.03)
UROBILINOGEN UR QL STRIP.AUTO: 0.2 E.U./DL
WBC #/AREA URNS AUTO: ABNORMAL /HPF

## 2018-11-21 PROCEDURE — 87086 URINE CULTURE/COLONY COUNT: CPT | Performed by: INTERNAL MEDICINE

## 2018-11-21 PROCEDURE — 81001 URINALYSIS AUTO W/SCOPE: CPT | Performed by: INTERNAL MEDICINE

## 2018-11-21 PROCEDURE — 99214 OFFICE O/P EST MOD 30 MIN: CPT | Performed by: INTERNAL MEDICINE

## 2018-11-21 RX ORDER — SULFAMETHOXAZOLE AND TRIMETHOPRIM 800; 160 MG/1; MG/1
1 TABLET ORAL EVERY 12 HOURS SCHEDULED
Qty: 10 TABLET | Refills: 0 | Status: SHIPPED | OUTPATIENT
Start: 2018-11-21 | End: 2018-11-26

## 2018-11-21 NOTE — PROGRESS NOTES
[address] [facility] [place]      NAME: Kj Zimmer  AGE: 74 y o  SEX: male  : 1945   MRN: [id number]    DATE: 2018  TIME: 3:02 PM    Assessment and Plan   1  Acute cystitis without hematuria  The patient's symptoms are suggestive of a urinary tract infection  Since he has a history of bladder cancer in recent instrumentation, I suggested that he get a urine culture  Thereafter he will start on Bactrim  - Urinalysis with microscopic  - Urine culture  - sulfamethoxazole-trimethoprim (BACTRIM DS) 800-160 mg per tablet; Take 1 tablet by mouth every 12 (twelve) hours for 5 days  Dispense: 10 tablet; Refill: 0    2  Coronary artery disease involving native heart without angina pectoris, unspecified vessel or lesion type  Currently without angina  On aspirin and appropriate risk factor modification  3  Essential hypertension  Well controlled  4  Type 2 diabetes mellitus with stage 3 chronic kidney disease, with long-term current use of insulin ([address] )  Not optimally controlled at present  Following with Endocrinology  5   Diabetic neuropathy      Return to office in:   As scheduled    Chief Complaint     Chief Complaint   Patient presents with    low back & legs ache, increased voiding and burning       History of Present Illness     The patient came to the office today on an urgent basis because of dysuria and back pain  He has had back pain for quite some time and is undergoing physical therapy at [facility]  Over the past several days his back pain has intensified  He has also noticed urinary frequency, dysuria, and a strong odor to his urine  He has not had any fever of which she is aware  He has had no hematuria  He has a history of bladder cancer that was resected several months ago  He has had no chest pain, shortness of breath, palpitations, or dizziness  He has had no symptoms of hypoglycemia or hyperglycemia      The following portions of the patient's history were reviewed and updated as appropriate: allergies, current medications, past family history, past medical history, past social history, past surgical history and problem list     Review of Systems   Review of Systems   Constitutional: Negative  HENT: Negative for congestion, ear pain, postnasal drip, rhinorrhea, sore throat and trouble swallowing  Eyes: Negative for pain, discharge, redness and visual disturbance  Respiratory: Negative for cough, shortness of breath and wheezing  Cardiovascular: Negative  Gastrointestinal: Negative  Endocrine: Negative  Genitourinary: Positive for dysuria and frequency  Negative for difficulty urinating, hematuria and urgency  Musculoskeletal: Positive for arthralgias and back pain  Negative for gait problem, joint swelling and myalgias  Skin: Negative for rash  Neurological: Negative for dizziness, speech difficulty, weakness, light-headedness, numbness and headaches  Hematological: Negative  Psychiatric/Behavioral: Negative for confusion, decreased concentration, dysphoric mood and sleep disturbance  The patient is not nervous/anxious          Active Problem List     Patient Active Problem List   Diagnosis    CAD (coronary artery disease)    Chronic obstructive pulmonary disease (HCC)    Carcinoma in situ of bladder    Essential hypertension    Hyperlipidemia    Presence of stent in coronary artery    Diabetic neuropathy, type II diabetes mellitus (HCC)    Puncture wound of foot, right    Cellulitis and abscess of foot    CKD (chronic kidney disease) stage 3, GFR 30-59 ml/min (HCC)    Primary osteoarthritis of left knee    Malignant tumor of urinary bladder (HCC)    Urinary tract infection       Objective   /84 (BP Location: Left arm, Patient Position: Sitting, Cuff Size: Standard)   Pulse 80   Temp (!) 95 2 °F (35 1 °C) (Tympanic)   Resp 15   Ht 6' 4" (1 93 m)   Wt 113 kg (250 lb)   BMI 30 43 kg/m²     Physical Exam Constitutional: He is oriented to person, place, and time  He appears well-developed and well-nourished  No distress  HENT:   Head: Normocephalic and atraumatic  Neck: Neck supple  No JVD present  No tracheal deviation present  No thyromegaly present  Cardiovascular: Normal rate, regular rhythm, normal heart sounds and intact distal pulses  Exam reveals no gallop and no friction rub  Pulses are no weak pulses  No murmur heard  Pulses:       Dorsalis pedis pulses are 2+ on the right side, and 2+ on the left side  Posterior tibial pulses are 2+ on the right side, and 2+ on the left side  Pulmonary/Chest: Effort normal and breath sounds normal  He has no wheezes  He has no rales  He exhibits no tenderness  Abdominal: Soft  Bowel sounds are normal  He exhibits no distension and no mass  There is no tenderness  There is no rebound  Musculoskeletal: Normal range of motion  He exhibits no edema or tenderness  Feet:   Right Foot:   Skin Integrity: Negative for ulcer, skin breakdown, erythema, warmth, callus or dry skin  Left Foot:   Skin Integrity: Negative for ulcer, skin breakdown, erythema, warmth, callus or dry skin  Lymphadenopathy:     He has no cervical adenopathy  Neurological: He is alert and oriented to person, place, and time  Skin: Skin is warm and dry  Psychiatric: He has a normal mood and affect  His behavior is normal  Judgment and thought content normal      Patient's shoes and socks removed  Right Foot/Ankle   Right Foot Inspection  Skin Exam: skin normal and skin intact no dry skin, no warmth, no callus, no erythema, no maceration, no abnormal color, no pre-ulcer, no ulcer and no callus                          Toe Exam: no swelling, no tenderness, erythema and  no right toe deformity  Sensory   Vibration: absent  Proprioception: absent   Monofilament testing: diminished  Vascular  Capillary refills: < 3 seconds  The right DP pulse is 2+  The right PT pulse is 2+  Left Foot/Ankle  Left Foot Inspection  Skin Exam: skin normal and skin intactno dry skin, no warmth, no erythema, no maceration, normal color, no pre-ulcer, no ulcer and no callus                         Toe Exam: ROM and strength within normal limitsno swelling, no erythema and no left toe deformity                   Sensory   Vibration: absent  Proprioception: absent  Monofilament: diminished  Vascular  Capillary refills: < 3 seconds  The left DP pulse is 2+  The left PT pulse is 2+  Assign Risk Category:  No deformity present; Loss of protective sensation;  No weak pulses       Risk: 1    Pertinent Laboratory/Diagnostic Studies:  Orders Only on 11/01/2018   Component Date Value Ref Range Status    Severity 11/01/2018 Normal   Final    Right Eye Diabetic Retinopathy 11/01/2018 None   Final    Left Eye Diabetic Retinopathy 11/01/2018 None   Final   Appointment on 11/01/2018   Component Date Value Ref Range Status    Sodium 11/01/2018 136  136 - 145 mmol/L Final    Potassium 11/01/2018 4 6  3 5 - 5 3 mmol/L Final    Chloride 11/01/2018 104  100 - 108 mmol/L Final    CO2 11/01/2018 30  21 - 32 mmol/L Final    ANION GAP 11/01/2018 2* 4 - 13 mmol/L Final    BUN 11/01/2018 27* 5 - 25 mg/dL Final    Creatinine 11/01/2018 1 44* 0 60 - 1 30 mg/dL Final    Glucose, Fasting 11/01/2018 80  65 - 99 mg/dL Final    Calcium 11/01/2018 8 7  8 3 - 10 1 mg/dL Final    AST 11/01/2018 59* 5 - 45 U/L Final    ALT 11/01/2018 86* 12 - 78 U/L Final    Alkaline Phosphatase 11/01/2018 101  46 - 116 U/L Final    Total Protein 11/01/2018 7 1  6 4 - 8 2 g/dL Final    Albumin 11/01/2018 3 4* 3 5 - 5 0 g/dL Final    Total Bilirubin 11/01/2018 0 38  0 20 - 1 00 mg/dL Final    eGFR 11/01/2018 55  ml/min/1 73sq m Final    Hemoglobin A1C 11/01/2018 8 4* 4 2 - 6 3 % Final    EAG 11/01/2018 194  mg/dl Final    TSH 3RD GENERATON 11/01/2018 2 260  0 358 - 3 740 uIU/mL Final    Creatinine, Ur 11/01/2018 129 0  mg/dL Final    Microalbum  ,U,Random 11/01/2018 369 0* 0 0 - 20 0 mg/L Final    Microalb Creat Ratio 11/01/2018 286* 0 - 30 mg/g creatinine Final    Cholesterol 11/01/2018 144  50 - 200 mg/dL Final    Triglycerides 11/01/2018 152* <=150 mg/dL Final    HDL, Direct 11/01/2018 36* 40 - 60 mg/dL Final    LDL Calculated 11/01/2018 78  0 - 100 mg/dL Final    WBC 11/01/2018 7 63  4 31 - 10 16 Thousand/uL Final    RBC 11/01/2018 4 29  3 88 - 5 62 Million/uL Final    Hemoglobin 11/01/2018 13 2  12 0 - 17 0 g/dL Final    Hematocrit 11/01/2018 40 3  36 5 - 49 3 % Final    MCV 11/01/2018 94  82 - 98 fL Final    MCH 11/01/2018 30 8  26 8 - 34 3 pg Final    MCHC 11/01/2018 32 8  31 4 - 37 4 g/dL Final    RDW 11/01/2018 13 2  11 6 - 15 1 % Final    MPV 11/01/2018 11 2  8 9 - 12 7 fL Final    Platelets 58/43/1054 184  149 - 390 Thousands/uL Final    nRBC 11/01/2018 0  /100 WBCs Final    Neutrophils Relative 11/01/2018 51  43 - 75 % Final    Immat GRANS % 11/01/2018 0  0 - 2 % Final    Lymphocytes Relative 11/01/2018 38  14 - 44 % Final    Monocytes Relative 11/01/2018 8  4 - 12 % Final    Eosinophils Relative 11/01/2018 2  0 - 6 % Final    Basophils Relative 11/01/2018 1  0 - 1 % Final    Neutrophils Absolute 11/01/2018 3 88  1 85 - 7 62 Thousands/µL Final    Immature Grans Absolute 11/01/2018 0 03  0 00 - 0 20 Thousand/uL Final    Lymphocytes Absolute 11/01/2018 2 92  0 60 - 4 47 Thousands/µL Final    Monocytes Absolute 11/01/2018 0 59  0 17 - 1 22 Thousand/µL Final    Eosinophils Absolute 11/01/2018 0 17  0 00 - 0 61 Thousand/µL Final    Basophils Absolute 11/01/2018 0 04  0 00 - 0 10 Thousands/µL Final   Office Visit on 09/20/2018   Component Date Value Ref Range Status    Hemoglobin A1C 09/20/2018 7 6   Final   Appointment on 09/19/2018   Component Date Value Ref Range Status    Sodium 09/19/2018 139  136 - 145 mmol/L Final    Potassium 09/19/2018 4 5  3 5 - 5 3 mmol/L Final    Chloride 09/19/2018 105  100 - 108 mmol/L Final    CO2 09/19/2018 27  21 - 32 mmol/L Final    ANION GAP 09/19/2018 7  4 - 13 mmol/L Final    BUN 09/19/2018 20  5 - 25 mg/dL Final    Creatinine 09/19/2018 1 57* 0 60 - 1 30 mg/dL Final    Glucose, Fasting 09/19/2018 106* 65 - 99 mg/dL Final    Calcium 09/19/2018 8 9  8 3 - 10 1 mg/dL Final    AST 09/19/2018 28  5 - 45 U/L Final    ALT 09/19/2018 47  12 - 78 U/L Final    Alkaline Phosphatase 09/19/2018 90  46 - 116 U/L Final    Total Protein 09/19/2018 7 5  6 4 - 8 2 g/dL Final    Albumin 09/19/2018 3 6  3 5 - 5 0 g/dL Final    Total Bilirubin 09/19/2018 0 53  0 20 - 1 00 mg/dL Final    eGFR 09/19/2018 49  ml/min/1 73sq m Final    WBC 09/19/2018 7 98  4 31 - 10 16 Thousand/uL Final    RBC 09/19/2018 3 96  3 88 - 5 62 Million/uL Final    Hemoglobin 09/19/2018 12 4  12 0 - 17 0 g/dL Final    Hematocrit 09/19/2018 37 0  36 5 - 49 3 % Final    MCV 09/19/2018 93  82 - 98 fL Final    MCH 09/19/2018 31 3  26 8 - 34 3 pg Final    MCHC 09/19/2018 33 5  31 4 - 37 4 g/dL Final    RDW 09/19/2018 13 0  11 6 - 15 1 % Final    MPV 09/19/2018 10 7  8 9 - 12 7 fL Final    Platelets 81/56/8446 175  149 - 390 Thousands/uL Final    nRBC 09/19/2018 0  /100 WBCs Final    Neutrophils Relative 09/19/2018 57  43 - 75 % Final    Immat GRANS % 09/19/2018 0  0 - 2 % Final    Lymphocytes Relative 09/19/2018 33  14 - 44 % Final    Monocytes Relative 09/19/2018 7  4 - 12 % Final    Eosinophils Relative 09/19/2018 3  0 - 6 % Final    Basophils Relative 09/19/2018 0  0 - 1 % Final    Neutrophils Absolute 09/19/2018 4 47  1 85 - 7 62 Thousands/µL Final    Immature Grans Absolute 09/19/2018 0 02  0 00 - 0 20 Thousand/uL Final    Lymphocytes Absolute 09/19/2018 2 65  0 60 - 4 47 Thousands/µL Final    Monocytes Absolute 09/19/2018 0 56  0 17 - 1 22 Thousand/µL Final    Eosinophils Absolute 09/19/2018 0 25  0 00 - 0 61 Thousand/µL Final    Basophils Absolute 09/19/2018 0 03  0 00 - 0 10 Thousands/µL Final  GGT 09/19/2018 32  5 - 85 U/L Final    Magnesium 09/19/2018 2 1  1 6 - 2 6 mg/dL Final    PTT 09/19/2018 30  24 - 36 seconds Final    Protime 09/19/2018 13 1  11 8 - 14 2 seconds Final    INR 09/19/2018 0 98  0 86 - 1 17 Final    Ferritin 09/19/2018 73  8 - 388 ng/mL Final   Appointment on 08/28/2018   Component Date Value Ref Range Status    Sodium 08/28/2018 137  136 - 145 mmol/L Final    Potassium 08/28/2018 4 3  3 5 - 5 3 mmol/L Final    Chloride 08/28/2018 101  100 - 108 mmol/L Final    CO2 08/28/2018 28  21 - 32 mmol/L Final    ANION GAP 08/28/2018 8  4 - 13 mmol/L Final    BUN 08/28/2018 26* 5 - 25 mg/dL Final    Creatinine 08/28/2018 1 59* 0 60 - 1 30 mg/dL Final    Glucose 08/28/2018 181* 65 - 140 mg/dL Final    Calcium 08/28/2018 9 3  8 3 - 10 1 mg/dL Final    eGFR 08/28/2018 48  ml/min/1 73sq m Final   Orders Only on 08/24/2018   Component Date Value Ref Range Status    Creatinine, Ur 08/28/2018 199 0  mg/dL Final    Microalbum  ,U,Random 08/28/2018 338 0* 0 0 - 20 0 mg/L Final    Microalb Creat Ratio 08/28/2018 170* 0 - 30 mg/g creatinine Final           Current Medications     Current Outpatient Prescriptions:     aspirin 81 MG tablet, Take 81 mg by mouth daily  , Disp: , Rfl:     bimatoprost (LUMIGAN) 0 01 % ophthalmic drops, 1 drop daily at bedtime  , Disp: , Rfl:     fluticasone (FLONASE) 50 mcg/act nasal spray, 1 spray into each nostril daily, Disp: 16 g, Rfl: 3    furosemide (LASIX) 20 mg tablet, Take 1 tablet (20 mg total) by mouth daily for 90 days Take 1 tablet daily, Disp: 90 tablet, Rfl: 1    gabapentin (NEURONTIN) 300 mg capsule, Take 2 capsules at bedtime, Disp: 180 capsule, Rfl: 0    insulin glargine (LANTUS SOLOSTAR) 100 units/mL injection pen, 37 units bedtime, Disp: 45 mL, Rfl: 0    isosorbide mononitrate (IMDUR) 30 mg 24 hr tablet, Take 1 tablet (30 mg total) by mouth daily for 90 days, Disp: 90 tablet, Rfl: 1    metFORMIN (GLUCOPHAGE) 1000 MG tablet, Take 0 5 tablets (500 mg total) by mouth daily with breakfast (Patient taking differently: Take 1,000 mg by mouth 2 (two) times a day with meals  ), Disp: 90 tablet, Rfl: 0    metoprolol succinate (TOPROL-XL) 100 mg 24 hr tablet, Take 100 mg by mouth daily  , Disp: , Rfl:     Mirabegron ER (MYRBETRIQ) 50 MG TB24, Take by mouth daily, Disp: , Rfl:     mirtazapine (REMERON) 15 mg tablet, 7 5 mg daily at bedtime  , Disp: , Rfl:     multivitamin (THERAGRAN) TABS, Take 1 tablet by mouth daily  , Disp: , Rfl:     NOVOLOG FLEXPEN 100 units/mL injection pen, Inject 15 Units under the skin 3 (three) times a day with meals, Disp: 15 pen, Rfl: 0    omeprazole (PriLOSEC) 20 mg delayed release capsule, TAKE 1 CAPSULE DAILY, Disp: 90 capsule, Rfl: 1    Azelastine HCl 0 15 % SOLN, Inhale 1 spray 2 (two) times a day, Disp: , Rfl: 0    bismuth subsalicylate (PEPTO BISMOL) 262 MG chewable tablet, Chew 524 mg daily as needed for indigestion  , Disp: , Rfl:     Blood Glucose Monitoring Suppl (ACCU-CHEK RITA PLUS) w/Device KIT, Use as directed , Disp: 1 kit, Rfl: 0    Cholecalciferol (VITAMIN D3) 24026 units CAPS, TAKE 3 CAPSULES PER MONTH (Patient taking differently: TAKE 1 CAPSULES PER MONTH), Disp: 12 capsule, Rfl: 0    fluocinonide (LIDEX) 0 05 % cream, Apply topically 2 (two) times a day , Disp: , Rfl:     Insulin Pen Needle 31G X 8 MM MISC, BD Ultra-Fine Short Pen Needle 31 gauge x 5/16", Disp: , Rfl:     loperamide (IMODIUM) 2 mg capsule, Take by mouth, Disp: , Rfl:     mupirocin (BACTROBAN) 2 % nasal ointment, , Disp: , Rfl:     sulfamethoxazole-trimethoprim (BACTRIM DS) 800-160 mg per tablet, Take 1 tablet by mouth every 12 (twelve) hours for 5 days, Disp: 10 tablet, Rfl: 0    tamsulosin (FLOMAX) 0 4 mg, Take 1 capsule (0 4 mg total) by mouth daily with dinner for 90 days (Patient not taking: Reported on 11/21/2018 ), Disp: 90 capsule, Rfl: 1      Baldemar Zambrano MD

## 2018-11-22 LAB — BACTERIA UR CULT: NORMAL

## 2018-11-30 DIAGNOSIS — E11.8 TYPE 2 DIABETES MELLITUS WITH COMPLICATION, UNSPECIFIED WHETHER LONG TERM INSULIN USE: ICD-10-CM

## 2018-11-30 DIAGNOSIS — E11.8 TYPE 2 DIABETES MELLITUS WITH COMPLICATION, WITH LONG-TERM CURRENT USE OF INSULIN (HCC): ICD-10-CM

## 2018-11-30 DIAGNOSIS — Z79.4 TYPE 2 DIABETES MELLITUS WITH COMPLICATION, WITH LONG-TERM CURRENT USE OF INSULIN (HCC): ICD-10-CM

## 2018-11-30 RX ORDER — INSULIN GLARGINE 100 [IU]/ML
INJECTION, SOLUTION SUBCUTANEOUS
Qty: 5 PEN | Refills: 3 | Status: SHIPPED | OUTPATIENT
Start: 2018-11-30 | End: 2018-12-19 | Stop reason: SDUPTHER

## 2018-11-30 RX ORDER — INSULIN ASPART 100 [IU]/ML
15 INJECTION, SOLUTION INTRAVENOUS; SUBCUTANEOUS
Qty: 5 PEN | Refills: 3 | Status: SHIPPED | OUTPATIENT
Start: 2018-11-30 | End: 2019-02-27 | Stop reason: SDUPTHER

## 2018-12-07 ENCOUNTER — HOSPITAL ENCOUNTER (EMERGENCY)
Facility: HOSPITAL | Age: 73
Discharge: HOME | End: 2018-12-07
Attending: EMERGENCY MEDICINE
Payer: MEDICARE

## 2018-12-07 ENCOUNTER — APPOINTMENT (EMERGENCY)
Dept: CT IMAGING | Facility: HOSPITAL | Age: 73
End: 2018-12-07
Payer: MEDICARE

## 2018-12-07 VITALS
SYSTOLIC BLOOD PRESSURE: 140 MMHG | TEMPERATURE: 97.4 F | DIASTOLIC BLOOD PRESSURE: 73 MMHG | HEART RATE: 71 BPM | RESPIRATION RATE: 18 BRPM | OXYGEN SATURATION: 98 %
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
## 2018-12-07 DIAGNOSIS — R51.9 HEADACHE: Primary | ICD-10-CM

## 2018-12-07 DIAGNOSIS — S16.1XXA CERVICAL STRAIN: ICD-10-CM

## 2018-12-07 PROCEDURE — 72125 CT NECK SPINE W/O DYE: CPT

## 2018-12-07 PROCEDURE — 70450 CT HEAD/BRAIN W/O DYE: CPT

## 2018-12-07 PROCEDURE — 99284 EMERGENCY DEPT VISIT MOD MDM: CPT

## 2018-12-07 RX ORDER — NAPROXEN 250 MG/1
500 TABLET ORAL ONCE
Status: COMPLETED | OUTPATIENT
Start: 2018-12-07 | End: 2018-12-07

## 2018-12-07 RX ORDER — ACETAMINOPHEN 325 MG/1
650 TABLET ORAL ONCE
Status: COMPLETED | OUTPATIENT
Start: 2018-12-07 | End: 2018-12-07

## 2018-12-07 RX ADMIN — NAPROXEN 500 MG: 250 TABLET ORAL at 18:01

## 2018-12-07 RX ADMIN — ACETAMINOPHEN 650 MG: 325 TABLET ORAL at 16:47

## 2018-12-07 NOTE — DISCHARGE INSTRUCTIONS
Cervical Strain   WHAT YOU NEED TO KNOW:   A cervical strain is a stretched or torn muscle or tendon in your neck  Tendons are strong tissues that connect muscles to bones  Common causes of cervical strains include a car accident, a fall, or a sports injury  DISCHARGE INSTRUCTIONS:   Return to the emergency department if:   · You have pain or numbness from your shoulder down to your hand  · You have problems with your vision, hearing, or balance  · You feel confused or cannot concentrate  · You have problems with movement and strength  Contact your healthcare provider if:   · You have increased swelling or pain in your neck  · You have questions or concerns about your condition or care  Medicines: You may need any of the following:  · Acetaminophen  decreases pain and fever  It is available without a doctor's order  Ask how much to take and how often to take it  Follow directions  Read the labels of all other medicines you are using to see if they also contain acetaminophen, or ask your doctor or pharmacist  Acetaminophen can cause liver damage if not taken correctly  Do not use more than 4 grams (4,000 milligrams) total of acetaminophen in one day  · NSAIDs , such as ibuprofen, help decrease swelling, pain, and fever  This medicine is available with or without a doctor's order  NSAIDs can cause stomach bleeding or kidney problems in certain people  If you take blood thinner medicine, always ask your healthcare provider if NSAIDs are safe for you  Always read the medicine label and follow directions  · Muscle relaxers  help decrease pain and muscle spasms  · Prescription pain medicine  may be given  Ask your healthcare provider how to take this medicine safely  Some prescription pain medicines contain acetaminophen  Do not take other medicines that contain acetaminophen without talking to your healthcare provider  Too much acetaminophen may cause liver damage   Prescription pain medicine may cause constipation  Ask your healthcare provider how to prevent or treat constipation  · Take your medicine as directed  Contact your healthcare provider if you think your medicine is not helping or if you have side effects  Tell him or her if you are allergic to any medicine  Keep a list of the medicines, vitamins, and herbs you take  Include the amounts, and when and why you take them  Bring the list or the pill bottles to follow-up visits  Carry your medicine list with you in case of an emergency  Manage your symptoms:   · Apply heat  on your neck for 15 to 20 minutes, 4 to 6 times a day or as directed  Heat helps decrease pain, stiffness, and muscle spasms  · Begin gentle neck exercises  as soon as you can move your neck without pain  Exercises will help decrease stiffness and improve the strength and movement of your neck  Ask your healthcare provider what kind of exercises you should do  · Gradually return to your usual activities as directed  Stop if you have pain  Avoid activities that can cause more damage to your neck, such as heavy lifting or strenuous exercise  · Sleep without a pillow  to help decrease pain  Instead, roll a small towel tightly and place it under your neck  · Go to physical therapy as directed  A physical therapist teaches you exercises to help improve movement and strength, and to decrease pain  Prevent neck injury:   · Drive safely  Make sure everyone in your car wears a seatbelt  A seatbelt can save your life if you are in an accident  Do not use your cell phone when you are driving  This could distract you and cause an accident  Pull over if you need to make a call or send a text message  · Wear helmets, lifejackets, and protective gear  Always wear a helmet when you ride a bike or motorcycle, go skiing, or play sports that could cause a head injury  Wear protective equipment when you play sports   Wear a lifejacket when you are on a boat or doing water sports  Follow up with your healthcare provider as directed: You may be referred to an orthopedist or physical therapies  Write down your questions so you remember to ask them during your visits  © 2017 [employer] Information is for End User's use only and may not be sold, redistributed or otherwise used for commercial purposes  All illustrations and images included in CareNotes® are the copyrighted property of [employer]  or [employer]  The above information is an  only  It is not intended as medical advice for individual conditions or treatments  Talk to your doctor, nurse or pharmacist before following any medical regimen to see if it is safe and effective for you  Motor Vehicle Accident   WHAT YOU NEED TO KNOW:   A motor vehicle accident (MVA) can cause injury from the impact or from being thrown around inside the car  You may have a bruise on your abdomen, chest, or neck from the seatbelt  You may also have pain in your face, neck, or back  You may have pain in your knee, hip, or thigh if your body hits the dash or the steering wheel  Muscle pain is commonly worse 1 to 2 days after an MVA  DISCHARGE INSTRUCTIONS:   Call 911 if:   · You have new or worsening chest pain or shortness of breath  Return to the emergency department if:   · You have new or worsening pain in your abdomen  · You have nausea and vomiting that does not get better  · You have a severe headache  · You have weakness, tingling, or numbness in your arms or legs  · You have new or worsening pain that makes it hard for you to move  Contact your healthcare provider if:   · You have pain that develops 2 to 3 days after the MVA  · You have questions or concerns about your condition or care  Medicines:   · Pain medicine: You may be given medicine to take away or decrease pain   Do not wait until the pain is severe before you take your medicine  · NSAIDs , such as ibuprofen, help decrease swelling, pain, and fever  This medicine is available with or without a doctor's order  NSAIDs can cause stomach bleeding or kidney problems in certain people  If you take blood thinner medicine, always ask if NSAIDs are safe for you  Always read the medicine label and follow directions  Do not give these medicines to children under 10months of age without direction from your child's healthcare provider  · Take your medicine as directed  Contact your healthcare provider if you think your medicine is not helping or if you have side effects  Tell him of her if you are allergic to any medicine  Keep a list of the medicines, vitamins, and herbs you take  Include the amounts, and when and why you take them  Bring the list or the pill bottles to follow-up visits  Carry your medicine list with you in case of an emergency  Follow up with your healthcare provider as directed:  Write down your questions so you remember to ask them during your visits  Safety tips:   · Always wear your seatbelt  This will help reduce serious injury from an MVA  · Use child safety seats  Your child needs to ride in a child safety seat made for his age, height, and weight  Ask your healthcare provider for more information about child safety seats  · Decrease speed  Drive the speed limit to reduce your risk for an MVA  · Do not drive if you are tired  You will react more slowly when you are tired  The slowed reaction time will increase your risk for an MVA  · Do not talk or text on your cell phone while you drive  You cannot respond fast enough in an emergency if you are distracted by texts or conversations  · Do not drink and drive  Use a designated   Call a taxi or get a ride home with someone if you have been drinking  Do not let your friends drive if they have been drinking alcohol  · Do not use illegal drugs and drive    You may be more tired or take risks that you normally would not take  Do not drive after you take prescription medicines that make you sleepy  Self-care:   · Use ice and heat  Ice helps decrease swelling and pain  Ice may also help prevent tissue damage  Use an ice pack, or put crushed ice in a plastic bag  Cover it with a towel and apply to your injured area for 15 to 20 minutes every hour, or as directed  After 2 days, use a heating pad on your injured area  Use heat as directed  · Gently stretch  Use gentle exercises to stretch your muscles after an MVA  Ask your healthcare provider for exercises you can do  © 2017 [employer] Information is for End User's use only and may not be sold, redistributed or otherwise used for commercial purposes  All illustrations and images included in CareNotes® are the copyrighted property of [employer]  or [employer]  The above information is an  only  It is not intended as medical advice for individual conditions or treatments  Talk to your doctor, nurse or pharmacist before following any medical regimen to see if it is safe and effective for you  General Headache   WHAT YOU NEED TO KNOW:   Headache pain may be mild or severe  Common causes include stress, medicines, and head injuries  Sleep problems, allergies, and hormone changes can also cause a headache  You may have frequent headaches that have no clear cause  Pain may start in another part of your body and move to your head  Headache pain can also move to other parts of your body  A headache can cause other symptoms, such as nausea and vomiting  A severe headache may be a sign of a stroke or other serious problem that needs immediate treatment    DISCHARGE INSTRUCTIONS:   Call 911 for any of the following:   · You have any of the following signs of a stroke:      ¨ Numbness or drooping on one side of your face     ¨ Weakness in an arm or leg    ¨ Confusion or difficulty speaking    ¨ Dizziness, a severe headache, or vision loss    Return to the emergency department if:   · You have a headache with neck stiffness and a fever  · You have a constant headache and are vomiting  · You have severe pain that does not get better after you take pain medicine  · You have a headache and the pain worsens when you look into light  · You have a headache and vision changes, such as blurred vision  · You have a headache and are forgetful or confused  Contact your healthcare provider if:   · You have a headache each day that does not get better, even after treatment  · You have changes in your headaches, or new symptoms that occur when you have a headache  · Others you live or work with also have headaches  · You have questions or concerns about your condition or care  Medicines: You may need any of the following:  · Medicines  may be given to prevent or treat headache pain  Do not wait until the pain is severe to take your medicine  Ask your healthcare provider how to take the medicine safely  · NSAIDs , such as ibuprofen, help decrease swelling, pain, and fever  This medicine is available with or without a doctor's order  NSAIDs can cause stomach bleeding or kidney problems in certain people  If you take blood thinner medicine, always ask if NSAIDs are safe for you  Always read the medicine label and follow directions  Do not give these medicines to children under 10months of age without direction from your child's healthcare provider  · Acetaminophen  decreases pain and fever  It is available without a doctor's order  Ask how much to take and how often to take it  Follow directions  Read the labels of all other medicines you are using to see if they also contain acetaminophen, or ask your doctor or pharmacist  Acetaminophen can cause liver damage if not taken correctly  Do not use more than 4 grams (4,000 milligrams) total of acetaminophen in one day  · Antinausea medicine  may be given to calm your stomach and help prevent vomiting  · Take your medicine as directed  Contact your healthcare provider if you think your medicine is not helping or if you have side effects  Tell him of her if you are allergic to any medicine  Keep a list of the medicines, vitamins, and herbs you take  Include the amounts, and when and why you take them  Bring the list or the pill bottles to follow-up visits  Carry your medicine list with you in case of an emergency  Manage your symptoms:   · Rest in a dark and quiet room  This may help decrease your pain  · Apply heat or ice as directed  Heat or ice may help decrease pain or muscle spasms  Apply heat or ice on the area for 20 minutes every 2 hours for as many days as directed  Your healthcare provider may recommend that you alternate heat and ice  · Relax your muscles to help relieve a headache  Lie down in a comfortable position and close your eyes  Relax your muscles slowly  Start at your toes and work your way up your body  A massage or warm bath may also help relax your muscles  Keep a headache record:  Record the dates and times that you get headaches, and what you were doing before the headache started  Also record what you ate and drank in the 24 hours before the headache started  This might help your healthcare provider find the cause of your headaches and make a treatment plan  The record can also help you avoid headache triggers or manage your symptoms  Get enough sleep:  You should get 8 to 10 hours of sleep each night  Create a sleep schedule  Go to bed and wake up at the same times each day  It may be helpful to do something relaxing before bed  Do not watch television right before bed  Do not smoke:  Nicotine and other chemicals in cigarettes and cigars can trigger a headache or make it worse  Ask your healthcare provider for information if you currently smoke and need help to quit   E-cigarettes or smokeless tobacco still contain nicotine  Talk to your healthcare provider before you use these products  Drink liquids as directed: You may need to drink more liquid to prevent dehydration  Dehydration can cause a headache  Ask your healthcare provider how much liquid to drink each day and which liquids are best for you  Limit caffeine and alcohol as directed: Your headaches may be triggered by caffeine or alcohol  You may also develop a headache if you drink caffeine regularly and suddenly stop  Eat a variety of healthy foods:  Do not skip meals  Too little food can trigger a headache  Include fruits, vegetables, whole-grain breads, low-fat dairy products, beans, lean meat, and fish  Do not have trigger foods, such as chocolate and red wine  Foods that contain gluten, nitrates, MSG, or artificial sweeteners may also trigger a headache  Follow up with your healthcare provider as directed:  Write down your questions so you remember to ask them during your visits  © 2017 [facility] Information is for End User's use only and may not be sold, redistributed or otherwise used for commercial purposes  All illustrations and images included in CareNotes® are the copyrighted property of [employer]  or [employer]  The above information is an  only  It is not intended as medical advice for individual conditions or treatments  Talk to your doctor, nurse or pharmacist before following any medical regimen to see if it is safe and effective for you
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
## 2018-12-10 ENCOUNTER — TELEPHONE (OUTPATIENT)
Dept: INTERNAL MEDICINE CLINIC | Facility: CLINIC | Age: 73
End: 2018-12-10

## 2018-12-17 ENCOUNTER — TELEPHONE (OUTPATIENT)
Dept: INTERNAL MEDICINE CLINIC | Facility: CLINIC | Age: 73
End: 2018-12-17
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
## 2018-12-18 ENCOUNTER — TELEPHONE (OUTPATIENT)
Dept: INTERNAL MEDICINE CLINIC | Facility: CLINIC | Age: 73
End: 2018-12-18

## 2018-12-18 NOTE — TELEPHONE ENCOUNTER
PC from PT re: steroid shot he is receiving in his back today  Concern with elevation in BS levels  I spoke to DR Nuris Martínez who will refer him to his Endocrinologist, DR Anny Ansari  I spoke to PT & advised of same

## 2018-12-19 ENCOUNTER — TELEPHONE (OUTPATIENT)
Dept: ENDOCRINOLOGY | Facility: HOSPITAL | Age: 73
End: 2018-12-19

## 2018-12-19 DIAGNOSIS — E11.8 TYPE 2 DIABETES MELLITUS WITH COMPLICATION, WITH LONG-TERM CURRENT USE OF INSULIN (HCC): ICD-10-CM

## 2018-12-19 DIAGNOSIS — Z79.4 TYPE 2 DIABETES MELLITUS WITH COMPLICATION, WITH LONG-TERM CURRENT USE OF INSULIN (HCC): ICD-10-CM

## 2018-12-19 RX ORDER — INSULIN GLARGINE 100 [IU]/ML
INJECTION, SOLUTION SUBCUTANEOUS
Qty: 5 PEN | Refills: 0
Start: 2018-12-19 | End: 2019-03-29 | Stop reason: SDUPTHER

## 2018-12-19 NOTE — TELEPHONE ENCOUNTER
Let us ask him to increase Lantus to 45 units for now to help the blood sugars  Can he call in on Friday to let us know how his sugars are doing? Thanks

## 2018-12-19 NOTE — TELEPHONE ENCOUNTER
Patient had a steroid shot yesterday & blood sugars are elevated  Would like to know how to adjust his insulin

## 2018-12-19 NOTE — TELEPHONE ENCOUNTER
When are sugars on the higher side? In the morning or later in the day? Let me know and we will adjust regimen

## 2018-12-29 DIAGNOSIS — E11.22 TYPE 2 DIABETES MELLITUS WITH STAGE 3 CHRONIC KIDNEY DISEASE, WITH LONG-TERM CURRENT USE OF INSULIN (HCC): ICD-10-CM

## 2018-12-29 DIAGNOSIS — N18.30 TYPE 2 DIABETES MELLITUS WITH STAGE 3 CHRONIC KIDNEY DISEASE, WITH LONG-TERM CURRENT USE OF INSULIN (HCC): ICD-10-CM

## 2018-12-29 DIAGNOSIS — Z79.4 TYPE 2 DIABETES MELLITUS WITH STAGE 3 CHRONIC KIDNEY DISEASE, WITH LONG-TERM CURRENT USE OF INSULIN (HCC): ICD-10-CM

## 2018-12-29 RX ORDER — BLOOD-GLUCOSE METER
EACH MISCELLANEOUS
Qty: 1 KIT | Refills: 0 | Status: SHIPPED | OUTPATIENT
Start: 2018-12-29 | End: 2019-05-08 | Stop reason: ALTCHOICE

## 2019-01-17 DIAGNOSIS — I25.10 CORONARY ARTERY DISEASE INVOLVING NATIVE HEART WITHOUT ANGINA PECTORIS, UNSPECIFIED VESSEL OR LESION TYPE: ICD-10-CM

## 2019-01-17 DIAGNOSIS — M54.5 LOW BACK PAIN, UNSPECIFIED BACK PAIN LATERALITY, UNSPECIFIED CHRONICITY, WITH SCIATICA PRESENCE UNSPECIFIED: ICD-10-CM

## 2019-01-17 RX ORDER — FUROSEMIDE 20 MG/1
TABLET ORAL
Qty: 90 TABLET | Refills: 1 | Status: SHIPPED | OUTPATIENT
Start: 2019-01-17 | End: 2019-07-05 | Stop reason: SDUPTHER

## 2019-01-17 RX ORDER — GABAPENTIN 300 MG/1
CAPSULE ORAL
Qty: 180 CAPSULE | Refills: 1 | Status: SHIPPED | OUTPATIENT
Start: 2019-01-17 | End: 2019-07-26 | Stop reason: SDUPTHER

## 2019-01-31 ENCOUNTER — OFFICE VISIT (OUTPATIENT)
Dept: INTERNAL MEDICINE CLINIC | Facility: CLINIC | Age: 74
End: 2019-01-31
Payer: MEDICARE

## 2019-01-31 ENCOUNTER — APPOINTMENT (INPATIENT)
Dept: RADIOLOGY | Facility: HOSPITAL | Age: 74
DRG: 552 | End: 2019-01-31
Payer: MEDICARE

## 2019-01-31 ENCOUNTER — HOSPITAL ENCOUNTER (INPATIENT)
Facility: HOSPITAL | Age: 74
LOS: 4 days | Discharge: HOME | DRG: 552 | End: 2019-02-04
Attending: INTERNAL MEDICINE | Admitting: INTERNAL MEDICINE
Payer: MEDICARE

## 2019-01-31 VITALS
TEMPERATURE: 96.6 F | DIASTOLIC BLOOD PRESSURE: 80 MMHG | RESPIRATION RATE: 15 BRPM | HEART RATE: 68 BPM | SYSTOLIC BLOOD PRESSURE: 124 MMHG

## 2019-01-31 DIAGNOSIS — Z79.4 TYPE 2 DIABETES MELLITUS WITH DIABETIC NEUROPATHY, WITH LONG-TERM CURRENT USE OF INSULIN (HCC): ICD-10-CM

## 2019-01-31 DIAGNOSIS — I25.10 CORONARY ARTERY DISEASE INVOLVING NATIVE HEART WITHOUT ANGINA PECTORIS, UNSPECIFIED VESSEL OR LESION TYPE: ICD-10-CM

## 2019-01-31 DIAGNOSIS — E78.5 HYPERLIPIDEMIA, UNSPECIFIED HYPERLIPIDEMIA TYPE: ICD-10-CM

## 2019-01-31 DIAGNOSIS — E11.40 TYPE 2 DIABETES MELLITUS WITH DIABETIC NEUROPATHY, WITH LONG-TERM CURRENT USE OF INSULIN (HCC): ICD-10-CM

## 2019-01-31 DIAGNOSIS — I10 ESSENTIAL HYPERTENSION: ICD-10-CM

## 2019-01-31 DIAGNOSIS — M54.9 BACK PAIN: Primary | ICD-10-CM

## 2019-01-31 DIAGNOSIS — M51.36 DEGENERATION OF LUMBAR INTERVERTEBRAL DISC: ICD-10-CM

## 2019-01-31 DIAGNOSIS — N18.30 CKD (CHRONIC KIDNEY DISEASE) STAGE 3, GFR 30-59 ML/MIN (HCC): ICD-10-CM

## 2019-01-31 DIAGNOSIS — R29.898 WEAKNESS OF BOTH LOWER EXTREMITIES: Primary | ICD-10-CM

## 2019-01-31 DIAGNOSIS — Z01.818 PREOPERATIVE CLEARANCE: ICD-10-CM

## 2019-01-31 DIAGNOSIS — IMO0002 DIABETES TYPE 2, UNCONTROLLED: ICD-10-CM

## 2019-01-31 PROBLEM — L02.619 CELLULITIS AND ABSCESS OF FOOT: Status: RESOLVED | Noted: 2017-02-22 | Resolved: 2019-01-31

## 2019-01-31 PROBLEM — S91.331A PUNCTURE WOUND OF FOOT, RIGHT: Status: RESOLVED | Noted: 2017-02-22 | Resolved: 2019-01-31

## 2019-01-31 PROBLEM — L03.119 CELLULITIS AND ABSCESS OF FOOT: Status: RESOLVED | Noted: 2017-02-22 | Resolved: 2019-01-31

## 2019-01-31 LAB
GLUCOSE SERPL-MCNC: 285 MG/DL (ref 65–140)
GLUCOSE SERPL-MCNC: 315 MG/DL (ref 65–140)

## 2019-01-31 PROCEDURE — 72148 MRI LUMBAR SPINE W/O DYE: CPT

## 2019-01-31 PROCEDURE — 1124F ACP DISCUSS-NO DSCNMKR DOCD: CPT | Performed by: INTERNAL MEDICINE

## 2019-01-31 PROCEDURE — 82948 REAGENT STRIP/BLOOD GLUCOSE: CPT

## 2019-01-31 PROCEDURE — 99223 1ST HOSP IP/OBS HIGH 75: CPT | Performed by: INTERNAL MEDICINE

## 2019-01-31 PROCEDURE — 99215 OFFICE O/P EST HI 40 MIN: CPT | Performed by: INTERNAL MEDICINE

## 2019-01-31 RX ORDER — FLUTICASONE PROPIONATE 50 MCG
1 SPRAY, SUSPENSION (ML) NASAL DAILY
Status: DISCONTINUED | OUTPATIENT
Start: 2019-02-01 | End: 2019-02-04 | Stop reason: HOSPADM

## 2019-01-31 RX ORDER — PANTOPRAZOLE SODIUM 40 MG/1
40 TABLET, DELAYED RELEASE ORAL
Status: DISCONTINUED | OUTPATIENT
Start: 2019-02-01 | End: 2019-02-04 | Stop reason: HOSPADM

## 2019-01-31 RX ORDER — AZELASTINE 1 MG/ML
1 SPRAY, METERED NASAL 2 TIMES DAILY
Status: DISCONTINUED | OUTPATIENT
Start: 2019-01-31 | End: 2019-02-04 | Stop reason: HOSPADM

## 2019-01-31 RX ORDER — INSULIN GLARGINE 100 [IU]/ML
20 INJECTION, SOLUTION SUBCUTANEOUS
Status: DISCONTINUED | OUTPATIENT
Start: 2019-01-31 | End: 2019-02-01

## 2019-01-31 RX ORDER — METOPROLOL SUCCINATE 100 MG/1
100 TABLET, EXTENDED RELEASE ORAL DAILY
Status: DISCONTINUED | OUTPATIENT
Start: 2019-02-01 | End: 2019-02-04 | Stop reason: HOSPADM

## 2019-01-31 RX ORDER — ISOSORBIDE MONONITRATE 30 MG/1
30 TABLET, EXTENDED RELEASE ORAL DAILY
Status: DISCONTINUED | OUTPATIENT
Start: 2019-02-01 | End: 2019-02-04 | Stop reason: HOSPADM

## 2019-01-31 RX ORDER — MIRTAZAPINE 15 MG/1
15 TABLET, FILM COATED ORAL
Status: DISCONTINUED | OUTPATIENT
Start: 2019-01-31 | End: 2019-02-04 | Stop reason: HOSPADM

## 2019-01-31 RX ORDER — GABAPENTIN 300 MG/1
300 CAPSULE ORAL 2 TIMES DAILY
Status: DISCONTINUED | OUTPATIENT
Start: 2019-01-31 | End: 2019-02-04 | Stop reason: HOSPADM

## 2019-01-31 RX ADMIN — INSULIN GLARGINE 20 UNITS: 100 INJECTION, SOLUTION SUBCUTANEOUS at 22:05

## 2019-01-31 RX ADMIN — INSULIN LISPRO 3 UNITS: 100 INJECTION, SOLUTION INTRAVENOUS; SUBCUTANEOUS at 19:26

## 2019-01-31 RX ADMIN — MIRTAZAPINE 15 MG: 15 TABLET, FILM COATED ORAL at 22:05

## 2019-01-31 RX ADMIN — GABAPENTIN 300 MG: 300 CAPSULE ORAL at 19:28

## 2019-01-31 RX ADMIN — MUPIROCIN 1 APPLICATION: 20 OINTMENT TOPICAL at 22:09

## 2019-01-31 RX ADMIN — BIMATOPROST 1 DROP: 0.1 SOLUTION/ DROPS OPHTHALMIC at 22:12

## 2019-01-31 NOTE — ASSESSMENT & PLAN NOTE
· Patient reports worsening back pain since August of this past year  Patient noted that after his recent knee left-sided knee surgery he was having continued symptoms of back pain  He subsequently was sent for an epidural injection back in December with minimal improvement of symptoms  In follow-up he had a repeat epidural injection this past Tuesday  · Patient reports that the back pain has continued  He also notes that he was unable to get up early this morning and move around  · Upon arrival to the hospital he is able to stand under his own volition however is extremely uncomfortable and requires assistance for stability  · Patient reports he last voided this morning  Will obtain a MRI of the back  · Follow up on bladder scan with serial bladder scans and neuro checks  · Consult Neurosurgery  · Stat MRI-discussed with MRI earlier  Will make arrangements for MRI imaging  · No anticoagulation pending decision on MRI findings
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
## 2019-01-31 NOTE — H&P
H&P- Susan Backer 1945, 74 y o  male MRN: [id number]    Unit/Bed#: [facility] 687-93 Encounter: [id number]    Primary Care Provider: Shira Cosme MD   Date and time admitted to hospital: 1/31/2019  5:33 PM        * Back pain   Assessment & Plan    · Patient reports worsening back pain since August of this past year  Patient noted that after his recent knee left-sided knee surgery he was having continued symptoms of back pain  He subsequently was sent for an epidural injection back in December with minimal improvement of symptoms  In follow-up he had a repeat epidural injection this past Tuesday  · Patient reports that the back pain has continued  He also notes that he was unable to get up early this morning and move around  · Upon arrival to the hospital he is able to stand under his own volition however is extremely uncomfortable and requires assistance for stability  · Patient reports he last voided this morning  Will obtain a MRI of the back  · Follow up on bladder scan with serial bladder scans and neuro checks  · Consult Neurosurgery  · Stat MRI-discussed with MRI earlier  Will make arrangements for MRI imaging  · No anticoagulation pending decision on MRI findings  Essential hypertension   Assessment & Plan    Metoprolol     Carcinoma in situ of bladder   Assessment & Plan    Patient with known history of bladder CA  Outpatient follow-up  Chronic obstructive pulmonary disease (HCC)   Assessment & Plan    Appears to be chronic  No acute exacerbation  CAD (coronary artery disease)   Assessment & Plan    Continue with metoprolol  imdur  Hold Lasix pending decision on MRI findings  VTE Prophylaxis: Will hold on anticoagulation pending results of MRI    / sequential compression device   Code Status:  Full code  POLST: There is no POLST form on file for this patient (pre-hospital)  Discussion with family:  Wife at bedside    Anticipated Length of Stay:  Patient will be admitted on an Inpatient basis with an anticipated length of stay of  greater than 2 midnights  Justification for Hospital Stay:  Needs further evaluation of back pain and lower extremity weakness  Total Time for Visit, including Counseling / Coordination of Care: 45 minutes  Greater than 50% of this total time spent on direct patient counseling and coordination of care  Chief Complaint:   Intractable back pain with bilateral lower extremity weakness    History of Present Illness:    Amanda Yip is a 74 y o  male who presents with the above chief complaint  Patient reportedly had symptoms going back to this past August   Time he had left-sided knee surgery performed and subsequently noted worsening back pain at that time  In follow-up he had a epidural injection back in December after follow-up with 0 AA  He noted minimal improvement and subsequently had a repeat epidural performed approximately 2 days ago  He reports that this morning he has not been able to weight bear and has been noting worsening back pain  Cailin Worthing He was sent to the hospital for further evaluation and imaging  Patient reports that he last voided this morning  He noted a increased urge to void however we did not feel like he completely void his bladder  He denies any concerns with incontinence or loss of sensation  He reports that he has chronic neuropathy in the lower extremities which is currently still at its baseline in terms of sensation and proprioception  He denies any recent traumas, any fevers, incontinence of bowel or bladder or loss of sensation  Review of Systems:    Review of Systems   Constitutional: Negative for diaphoresis, fatigue and fever  Eyes: Negative for pain and itching  Gastrointestinal: Negative for abdominal distention and abdominal pain  Genitourinary: Negative for dysuria  Musculoskeletal: Negative for back pain and gait problem  Neurological: Negative for dizziness and headaches  Psychiatric/Behavioral: Negative for agitation and confusion  All other systems reviewed and are negative  Past Medical and Surgical History:     Past Medical History:   Diagnosis Date    Anemia     Last assessed: 9/28/17    Arteriosclerotic cardiovascular disease     Last assessed: 9/28/17    [facility]adder [facility] ([facility] )     Cardiac disease     Diabetes mellitus ([facility] )     Hypertension     Hypotension     Last assessed: 2/24/15    Insomnia     Last assessed: 11/14/12    Other seasonal allergic rhinitis     Last assessed: 2/10/16    Transient cerebral ischemia        Past Surgical History:   Procedure Laterality Date    CARDIAC SURGERY      Cath stent placement  Last assessed: 3/9/17  Interventional Catheterization    CHOLECYSTECTOMY      CYSTOSCOPY      Diagnostic w/biopsy  Glee Form  Last assessed: 12/1/14    CYSTOURETHROSCOPY      w/cautery  Glee Form    GASTRIC BYPASS      For morbid obesity w/Shaji-en-Y  Resolved: 11/17/09    INCISION AND DRAINAGE OF WOUND Right 2/26/2017    Procedure: INCISION AND DRAINAGE (I&D) EXTREMITY WITH APPLICATION OF GRAFT JACKET;  Surgeon: Octavia Nina DPM;  [facility];  Service:     INCISION AND DRAINAGE OF WOUND Right 4/25/2017    Procedure: INCISION AND DRAINAGE (I&D) EXTREMITY, APPLICATION OF GRAFT;  Surgeon: Octavia Nina DPM;  [facility];  Service:     JOINT REPLACEMENT      Knee  Last assessed: 1/28/11    [place] CYSTOURETHROSCOPY,BIOPSY N/A 8/16/2016    Procedure: Chetna Ayers;  Surgeon: Tamir Lai MD;  [facility];  Service: Urology    ROTATOR CUFF REPAIR      SMALL INTESTINE SURGERY      Surgery Shaji-en-Y    SPINAL FUSION      VAC DRESSING APPLICATION Right 3/26/4183    Procedure: APPLICATION VAC DRESSING;  Surgeon: Octavia Nina DPM;  [facility];  Service:        Meds/Allergies:    Prior to Admission medications    Medication Sig Start Date End Date Taking?  Authorizing Provider   aspirin 81 MG tablet Take 81 mg by mouth daily  Historical Provider, MD   Azelastine HCl 0 15 % SOLN Inhale 1 spray 2 (two) times a day 1/8/18   Historical Provider, MD   bimatoprost (LUMIGAN) 0 01 % ophthalmic drops 1 drop daily at bedtime  Historical Provider, MD   bismuth subsalicylate (PEPTO BISMOL) 262 MG chewable tablet Chew 524 mg daily as needed for indigestion  Historical Provider, MD   Blood Glucose Monitoring Suppl (ACCU-CHEK RITA PLUS) w/Device KIT USE AS DIRECTED  *PA* 12/29/18   Page Mathews DO   Cholecalciferol (VITAMIN D3) 34868 units CAPS TAKE 3 CAPSULES PER MONTH  Patient taking differently: TAKE 1 CAPSULES PER MONTH 3/13/18   Adam Patton MD   fluocinonide (LIDEX) 0 05 % cream Apply topically 2 (two) times a day  Historical Provider, MD   fluticasone [facility]) 50 mcg/act nasal spray 1 spray into each nostril daily 9/20/18   Adam Patton MD   furosemide (LASIX) 20 mg tablet TAKE 1 TABLET DAILY 1/17/19   Adam Patton MD   gabapentin (NEURONTIN) 300 mg capsule Take 2 capsules at bedtime 1/17/19   Adam Patton MD   Insulin Pen Needle 31G X 8 MM MISC BD Ultra-Fine Short Pen Needle 31 gauge x 5/16"    Historical Provider, MD   isosorbide mononitrate (IMDUR) 30 mg 24 hr tablet Take 1 tablet (30 mg total) by mouth daily for 90 days 5/18/18 1/31/19  Adam Patton MD   LANTUS SOLOSTAR 100 units/mL injection pen 45 units bedtime 12/19/18   Page Mathews DO   loperamide (IMODIUM) 2 mg capsule Take by mouth 10/6/16   Historical Provider, MD   metFORMIN (GLUCOPHAGE) 1000 MG tablet Take 0 5 tablets (500 mg total) by mouth daily with breakfast  Patient taking differently: Take 1,000 mg by mouth 2 (two) times a day with meals   11/1/18   Adam Patton MD   metoprolol succinate (TOPROL-XL) 100 mg 24 hr tablet Take 100 mg by mouth daily      Historical Provider, MD   Mirabegron ER (MYRBETRIQ) 50 MG TB24 Take by mouth daily    Historical Provider, MD   mirtazapine (REMERON) 15 mg tablet 7 5 mg daily at bedtime   12/22/17   Historical Provider, MD   multivitamin (THERAGRAN) TABS Take 1 tablet by mouth daily  Historical Provider, MD reuben Nicholson) 2 % nasal ointment  1/20/15   Historical Provider, MD   NOVOLOG FLEXPEN 100 units/mL injection pen Inject 15 Units under the skin 3 (three) times a day with meals 11/30/18   Marla Berkowitz,    omeprazole (PriLOSEC) 20 mg delayed release capsule TAKE 1 CAPSULE DAILY 7/15/18   Rubén Mixon MD   [facility]) 0 4 mg Take 1 capsule (0 4 mg total) by mouth daily with dinner for 90 days  Patient not taking: Reported on 11/21/2018 5/18/18 1/31/19  Rubén Mixon MD     I have reviewed home medications with patient personally  Allergies: Allergies   Allergen Reactions    Atorvastatin Hives, Other (See Comments) and Itching     Pt   Reports "Hives"  Lipitor and Simvastatin    Simvastatin Rash     Other reaction(s): Edema, Other Reaction  "ALL STATINS"    Insulin Lispro Edema    Statins Itching    Other Other (See Comments), Itching and Rash     rash to electrodes  rash to electrodes and adhesives       Social History:     Marital Status: /Civil Union   Occupation:  Retired   Patient Pre-hospital Living Situation:  Home  Patient Pre-hospital Level of Mobility:  Ambulatory  Patient Pre-hospital Diet Restrictions:  Denies  Substance Use History:   History   Alcohol Use No     History   Smoking Status    Former Smoker   Smokeless Tobacco    Never Used     History   Drug Use No       Family History:    Family History   Problem Relation Age of Onset    Diabetes Mother     Heart disease Mother     Other Mother         High blood pressure    Heart disease Father     Diabetes Sister     Other Sister         High blood pressure    Kidney disease Sister     Heart disease Brother     Other Brother         High blood pressure       Physical Exam:     Vitals:        Physical Exam   Constitutional: He is oriented to person, place, and time  HENT:   Head: Normocephalic and atraumatic  Eyes: Pupils are equal, round, and reactive to light  Conjunctivae are normal    Neck: Normal range of motion  Neck supple  No thyromegaly present  Cardiovascular: Normal rate and regular rhythm  Pulmonary/Chest: Effort normal and breath sounds normal  No respiratory distress  He has no wheezes  Abdominal: Soft  Bowel sounds are normal  He exhibits no distension  Neurological: He is alert and oriented to person, place, and time  No cranial nerve deficit  Cranial nerves intact  Patient able to stand up from a seated position however requires assistance of 1 for balance and stability  Speech intact  Reflexes bilaterally +1 extremities   Sensation grossly intact     Skin: Skin is warm and dry  No erythema  Psychiatric: He has a normal mood and affect  Additional Data:     Lab Results: I have personally reviewed pertinent reports  Imaging: I have personally reviewed pertinent reports  MRI inpatient order    (Results Pending)       ·     ** Please Note: This note has been constructed using a voice recognition system   **

## 2019-02-01 LAB
ALBUMIN SERPL BCP-MCNC: 3.3 G/DL (ref 3.5–5)
ALBUMIN SERPL BCP-MCNC: 3.4 G/DL (ref 3.5–5)
ALP SERPL-CCNC: 81 U/L (ref 46–116)
ALP SERPL-CCNC: 83 U/L (ref 46–116)
ALT SERPL W P-5'-P-CCNC: 85 U/L (ref 12–78)
ALT SERPL W P-5'-P-CCNC: 86 U/L (ref 12–78)
ANION GAP SERPL CALCULATED.3IONS-SCNC: 7 MMOL/L (ref 4–13)
ANION GAP SERPL CALCULATED.3IONS-SCNC: 8 MMOL/L (ref 4–13)
ANION GAP SERPL CALCULATED.3IONS-SCNC: 9 MMOL/L (ref 4–13)
AST SERPL W P-5'-P-CCNC: 42 U/L (ref 5–45)
AST SERPL W P-5'-P-CCNC: 44 U/L (ref 5–45)
BASOPHILS # BLD AUTO: 0.01 THOUSANDS/ΜL (ref 0–0.1)
BASOPHILS NFR BLD AUTO: 0 % (ref 0–1)
BILIRUB SERPL-MCNC: 0.53 MG/DL (ref 0.2–1)
BILIRUB SERPL-MCNC: 0.53 MG/DL (ref 0.2–1)
BUN SERPL-MCNC: 30 MG/DL (ref 5–25)
BUN SERPL-MCNC: 33 MG/DL (ref 5–25)
BUN SERPL-MCNC: 38 MG/DL (ref 5–25)
CALCIUM SERPL-MCNC: 8.6 MG/DL (ref 8.3–10.1)
CALCIUM SERPL-MCNC: 8.7 MG/DL (ref 8.3–10.1)
CALCIUM SERPL-MCNC: 8.7 MG/DL (ref 8.3–10.1)
CHLORIDE SERPL-SCNC: 102 MMOL/L (ref 100–108)
CHLORIDE SERPL-SCNC: 103 MMOL/L (ref 100–108)
CHLORIDE SERPL-SCNC: 98 MMOL/L (ref 100–108)
CO2 SERPL-SCNC: 24 MMOL/L (ref 21–32)
CO2 SERPL-SCNC: 26 MMOL/L (ref 21–32)
CO2 SERPL-SCNC: 26 MMOL/L (ref 21–32)
CREAT SERPL-MCNC: 1.59 MG/DL (ref 0.6–1.3)
CREAT SERPL-MCNC: 1.62 MG/DL (ref 0.6–1.3)
CREAT SERPL-MCNC: 1.84 MG/DL (ref 0.6–1.3)
EOSINOPHIL # BLD AUTO: 0.02 THOUSAND/ΜL (ref 0–0.61)
EOSINOPHIL NFR BLD AUTO: 0 % (ref 0–6)
ERYTHROCYTE [DISTWIDTH] IN BLOOD BY AUTOMATED COUNT: 12.8 % (ref 11.6–15.1)
ERYTHROCYTE [DISTWIDTH] IN BLOOD BY AUTOMATED COUNT: 12.8 % (ref 11.6–15.1)
EST. AVERAGE GLUCOSE BLD GHB EST-MCNC: 206 MG/DL
GFR SERPL CREATININE-BSD FRML MDRD: 41 ML/MIN/1.73SQ M
GFR SERPL CREATININE-BSD FRML MDRD: 47 ML/MIN/1.73SQ M
GFR SERPL CREATININE-BSD FRML MDRD: 48 ML/MIN/1.73SQ M
GLUCOSE SERPL-MCNC: 264 MG/DL (ref 65–140)
GLUCOSE SERPL-MCNC: 328 MG/DL (ref 65–140)
GLUCOSE SERPL-MCNC: 336 MG/DL (ref 65–140)
GLUCOSE SERPL-MCNC: 381 MG/DL (ref 65–140)
GLUCOSE SERPL-MCNC: 424 MG/DL (ref 65–140)
GLUCOSE SERPL-MCNC: 598 MG/DL (ref 65–140)
GLUCOSE SERPL-MCNC: >500 MG/DL (ref 65–140)
HBA1C MFR BLD HPLC: 8.8 %
HBA1C MFR BLD: 8.8 % (ref 4.2–6.3)
HCT VFR BLD AUTO: 34.3 % (ref 36.5–49.3)
HCT VFR BLD AUTO: 34.3 % (ref 36.5–49.3)
HGB BLD-MCNC: 11.9 G/DL (ref 12–17)
HGB BLD-MCNC: 11.9 G/DL (ref 12–17)
IMM GRANULOCYTES # BLD AUTO: 0.05 THOUSAND/UL (ref 0–0.2)
IMM GRANULOCYTES NFR BLD AUTO: 1 % (ref 0–2)
LYMPHOCYTES # BLD AUTO: 1.99 THOUSANDS/ΜL (ref 0.6–4.47)
LYMPHOCYTES NFR BLD AUTO: 22 % (ref 14–44)
MAGNESIUM SERPL-MCNC: 2.1 MG/DL (ref 1.6–2.6)
MCH RBC QN AUTO: 31.8 PG (ref 26.8–34.3)
MCH RBC QN AUTO: 31.8 PG (ref 26.8–34.3)
MCHC RBC AUTO-ENTMCNC: 34.7 G/DL (ref 31.4–37.4)
MCHC RBC AUTO-ENTMCNC: 34.7 G/DL (ref 31.4–37.4)
MCV RBC AUTO: 92 FL (ref 82–98)
MCV RBC AUTO: 92 FL (ref 82–98)
MONOCYTES # BLD AUTO: 0.64 THOUSAND/ΜL (ref 0.17–1.22)
MONOCYTES NFR BLD AUTO: 7 % (ref 4–12)
NEUTROPHILS # BLD AUTO: 6.43 THOUSANDS/ΜL (ref 1.85–7.62)
NEUTS SEG NFR BLD AUTO: 70 % (ref 43–75)
NRBC BLD AUTO-RTO: 0 /100 WBCS
PHOSPHATE SERPL-MCNC: 3.1 MG/DL (ref 2.3–4.1)
PLATELET # BLD AUTO: 160 THOUSANDS/UL (ref 149–390)
PLATELET # BLD AUTO: 160 THOUSANDS/UL (ref 149–390)
PMV BLD AUTO: 11.1 FL (ref 8.9–12.7)
PMV BLD AUTO: 11.1 FL (ref 8.9–12.7)
POTASSIUM SERPL-SCNC: 4.1 MMOL/L (ref 3.5–5.3)
POTASSIUM SERPL-SCNC: 4.1 MMOL/L (ref 3.5–5.3)
POTASSIUM SERPL-SCNC: 4.4 MMOL/L (ref 3.5–5.3)
PROT SERPL-MCNC: 6.6 G/DL (ref 6.4–8.2)
PROT SERPL-MCNC: 6.6 G/DL (ref 6.4–8.2)
RBC # BLD AUTO: 3.74 MILLION/UL (ref 3.88–5.62)
RBC # BLD AUTO: 3.74 MILLION/UL (ref 3.88–5.62)
SODIUM SERPL-SCNC: 131 MMOL/L (ref 136–145)
SODIUM SERPL-SCNC: 136 MMOL/L (ref 136–145)
SODIUM SERPL-SCNC: 136 MMOL/L (ref 136–145)
WBC # BLD AUTO: 9.14 THOUSAND/UL (ref 4.31–10.16)
WBC # BLD AUTO: 9.14 THOUSAND/UL (ref 4.31–10.16)

## 2019-02-01 PROCEDURE — G8978 MOBILITY CURRENT STATUS: HCPCS

## 2019-02-01 PROCEDURE — G8988 SELF CARE GOAL STATUS: HCPCS

## 2019-02-01 PROCEDURE — 82948 REAGENT STRIP/BLOOD GLUCOSE: CPT

## 2019-02-01 PROCEDURE — 80053 COMPREHEN METABOLIC PANEL: CPT | Performed by: INTERNAL MEDICINE

## 2019-02-01 PROCEDURE — 83735 ASSAY OF MAGNESIUM: CPT | Performed by: INTERNAL MEDICINE

## 2019-02-01 PROCEDURE — 80048 BASIC METABOLIC PNL TOTAL CA: CPT | Performed by: PHYSICIAN ASSISTANT

## 2019-02-01 PROCEDURE — 97163 PT EVAL HIGH COMPLEX 45 MIN: CPT

## 2019-02-01 PROCEDURE — 83036 HEMOGLOBIN GLYCOSYLATED A1C: CPT | Performed by: PHYSICIAN ASSISTANT

## 2019-02-01 PROCEDURE — 97167 OT EVAL HIGH COMPLEX 60 MIN: CPT

## 2019-02-01 PROCEDURE — 85027 COMPLETE CBC AUTOMATED: CPT | Performed by: INTERNAL MEDICINE

## 2019-02-01 PROCEDURE — G8979 MOBILITY GOAL STATUS: HCPCS

## 2019-02-01 PROCEDURE — 85025 COMPLETE CBC W/AUTO DIFF WBC: CPT | Performed by: INTERNAL MEDICINE

## 2019-02-01 PROCEDURE — 84100 ASSAY OF PHOSPHORUS: CPT | Performed by: INTERNAL MEDICINE

## 2019-02-01 PROCEDURE — 99223 1ST HOSP IP/OBS HIGH 75: CPT | Performed by: NEUROLOGICAL SURGERY

## 2019-02-01 PROCEDURE — G8987 SELF CARE CURRENT STATUS: HCPCS

## 2019-02-01 PROCEDURE — 99232 SBSQ HOSP IP/OBS MODERATE 35: CPT | Performed by: NURSE PRACTITIONER

## 2019-02-01 RX ORDER — DEXAMETHASONE 2 MG/1
2 TABLET ORAL EVERY 6 HOURS SCHEDULED
Status: COMPLETED | OUTPATIENT
Start: 2019-02-02 | End: 2019-02-03

## 2019-02-01 RX ORDER — INSULIN GLARGINE 100 [IU]/ML
45 INJECTION, SOLUTION SUBCUTANEOUS
Status: DISCONTINUED | OUTPATIENT
Start: 2019-02-01 | End: 2019-02-02

## 2019-02-01 RX ADMIN — SODIUM CHLORIDE 1000 ML: 0.9 INJECTION, SOLUTION INTRAVENOUS at 22:45

## 2019-02-01 RX ADMIN — GABAPENTIN 300 MG: 300 CAPSULE ORAL at 18:13

## 2019-02-01 RX ADMIN — PANTOPRAZOLE SODIUM 40 MG: 40 TABLET, DELAYED RELEASE ORAL at 06:28

## 2019-02-01 RX ADMIN — ISOSORBIDE MONONITRATE 30 MG: 30 TABLET, EXTENDED RELEASE ORAL at 08:44

## 2019-02-01 RX ADMIN — GABAPENTIN 300 MG: 300 CAPSULE ORAL at 08:44

## 2019-02-01 RX ADMIN — MIRTAZAPINE 15 MG: 15 TABLET, FILM COATED ORAL at 21:48

## 2019-02-01 RX ADMIN — INSULIN GLARGINE 45 UNITS: 100 INJECTION, SOLUTION SUBCUTANEOUS at 22:08

## 2019-02-01 RX ADMIN — METOPROLOL SUCCINATE 100 MG: 100 TABLET, FILM COATED, EXTENDED RELEASE ORAL at 08:44

## 2019-02-01 RX ADMIN — INSULIN LISPRO 2 UNITS: 100 INJECTION, SOLUTION INTRAVENOUS; SUBCUTANEOUS at 08:45

## 2019-02-01 RX ADMIN — INSULIN LISPRO 4 UNITS: 100 INJECTION, SOLUTION INTRAVENOUS; SUBCUTANEOUS at 18:10

## 2019-02-01 RX ADMIN — DEXAMETHASONE 2 MG: 2 TABLET ORAL at 23:41

## 2019-02-01 RX ADMIN — BIMATOPROST 1 DROP: 0.1 SOLUTION/ DROPS OPHTHALMIC at 21:50

## 2019-02-01 RX ADMIN — INSULIN LISPRO 4 UNITS: 100 INJECTION, SOLUTION INTRAVENOUS; SUBCUTANEOUS at 12:55

## 2019-02-01 NOTE — OCCUPATIONAL THERAPY NOTE
[address] Evaluation     Patient Name: Alcides Vasquez  Today's Date: 2/1/2019  Problem List  Patient Active Problem List   Diagnosis    CAD (coronary artery disease)    [facility]    Carcinoma in situ of bladder    Essential hypertension    Hyperlipidemia    Presence of stent in coronary artery    Diabetic neuropathy, type II diabetes mellitus ([facility] )    CKD (chronic kidney disease) stage 3, GFR 30-59 ml/min ([facility])    Primary osteoarthritis of left knee    Malignant tumor of urinary bladder ([facility] )    Urinary tract infection    Degeneration of lumbar intervertebral disc    Back pain     Past Medical History  Past Medical History:   Diagnosis Date    Anemia     Last assessed: 9/28/17    Arteriosclerotic cardiovascular disease     Last assessed: 9/28/17    Bladder cancer ([facility] )     Cardiac disease     Diabetes mellitus ([facility] )     Hypertension     Hypotension     Last assessed: 2/24/15    Insomnia     Last assessed: 11/14/12    Other seasonal allergic rhinitis     Last assessed: 2/10/16    Transient cerebral ischemia      Past Surgical History  Past Surgical History:   Procedure Laterality Date    CARDIAC SURGERY      Cath stent placement  Last assessed: 3/9/17  Interventional Catheterization    CHOLECYSTECTOMY      CYSTOSCOPY      Diagnostic w/biopsy  Alexy Spearing  Last assessed: 12/1/14    CYSTOURETHROSCOPY      w/cautery  Alexy Spearing    GASTRIC BYPASS      For morbid obesity w/Shaji-en-Y  Resolved: 11/17/09    INCISION AND DRAINAGE OF WOUND Right 2/26/2017    Procedure: INCISION AND DRAINAGE (I&D) EXTREMITY WITH APPLICATION OF GRAFT JACKET;  Surgeon: Tim Gottron, DPM;  [facility];  Service:     INCISION AND DRAINAGE OF WOUND Right 4/25/2017    Procedure: INCISION AND DRAINAGE (I&D) EXTREMITY, APPLICATION OF GRAFT;  Surgeon: Tim Gottron, DPM;  [facility];  Service:     JOINT REPLACEMENT      Knee   Last assessed: 1/28/11    [place] CYSTOURETHROSCOPY,BIOPSY N/A 8/16/2016    Procedure: [place];  Surgeon: Justo Avelar MD;  Location:  MAIN OR;  Service: Urology    ROTATOR CUFF REPAIR      SMALL INTESTINE SURGERY      Surgery Shaji-en-Y    SPINAL FUSION      VAC DRESSING APPLICATION Right 3/51/0856    Procedure: APPLICATION VAC DRESSING;  Surgeon: Ophelia Mars DPM;  [facility];  Service:          02/01/19 1030   Note Type   Note type Eval/Treat   Restrictions/Precautions   Weight Bearing Precautions Per Order No   Other Precautions Telemetry; Fall Risk;Pain   Pain Assessment   Pain Assessment 0-10   Pain Score Worst Possible Pain   Pain Type Acute pain   Pain Location Leg   Pain Orientation Left   Home Living   Type of [address] [facility] Two level;Bed/bath upstairs;1/2 bath on main level   Bathroom Shower/Tub Walk-in shower   Bathroom Toilet Standard   Bathroom Equipment (none)   Bathroom Accessibility Accessible   Home Equipment Walker;Cane   Prior Function   Level of [place] Independent with ADLs and functional mobility   Lives With Spouse   Receives Help From Family   ADL Assistance Independent   IADLs Needs assistance   Falls in the last 6 months 0   Vocational Retired   Lifestyle   Autonomy Pt is I w/ ADLs, however has required increased assist as of recently  His spouse assists w/ IADLs  Pt uses a RW and/or cane PTA     Reciprocal Relationships Pt lives w/ supportive spouse   Service to Others Retired   Intrinsic Gratification Enjoys playing [employer] AS (WDL) WDL   ADL   Where Assessed Chair   Eating Assistance 6  Modified independent   Eating Deficit Setup   Grooming Assistance 5  [address] [facility] 5  [address] [facility] 2  [address] [facility] 5  Supervision/Setup    [facility] 2  Maximal [address]  3  Moderate Assistance   Bed Mobility Supine to Sit 4  Minimal assistance   Additional items Assist x 1   Transfers   Sit to Stand 4  Minimal assistance   Additional items Assist x 1   Stand to Sit 4  Minimal assistance   Additional items Assist x 1   Stand pivot 4  Minimal assistance   Additional items Assist x 1   Functional Mobility   Functional Mobility 4  Minimal assistance   Additional items Rolling walker   Balance   Static Sitting Fair +   Dynamic Sitting Fair   Static Standing Fair   Dynamic Standing Poor +   Activity Tolerance   Activity Tolerance Patient limited by fatigue;Patient limited by pain   Nurse Made Aware Okay to see per Oliverio VACA Assessment   RUE Assessment WFL   LUE Assessment   LUE Assessment WFL   Hand Function   Gross Motor Coordination Functional   Fine Motor Coordination Functional   Cognition   Overall Cognitive Status WFL   Arousal/Participation Alert; Responsive; Cooperative   Attention Within functional limits   Orientation Level Oriented X4   Memory Within functional limits   Following Commands Follows all commands and directions without difficulty   Comments Pt pleasant and motivated to participate   Assessment   Limitation Decreased ADL status; Decreased endurance;Decreased self-care trans;Decreased high-level ADLs   Prognosis Good   Assessment Pt is a 74 y o  male who was admitted to [place] [address] on 1/31/2019 with Back pain  MRI of spine revealed "Severe chronic disc and facet degenerative change throughout the lumbar spine, most prominent at L4-5 " Pt w/ active "up w/ assistance" orders and no order for spinal brace at time of eval  Pt's comorbidities include CAD, COPD, HLD, DM2, osteoarthritis of L knee, and CKD  At baseline pt was mostly I w/ ADLs, however he has required increased assist recently  Pt uses a RW to ambulate  Pt lives w/ spouse in [address] [facility] w/ 8 ADILENE  Currently pt requires S for UB ADLs, max A LB ASLs, and min Ax1 for functional mobility/transfers w/ RW   Pt currently presents with impairments in the following categories -steps to enter environment, difficulty performing ADLS, difficulty performing IADLS, activity tolerance, endurance, standing balance/tolerance and sitting balance/tolerance  These impairments, as well as pt's fatigue, pain, spinal precautions, orthopedic restricitions  and risk for falls  limit pt's ability to safely engage in all baseline areas of occupation, including bathing, dressing, toileting, functional mobility/transfers and leisure activities  From OT standpoint, anticipate pt will progress to be able to return home w/ increased family support and home OT upon D/C  Rec pt have BSC upon d/c as well  OT will continue to follow to address the below stated goals  Goals   Patient Goals to have less pain   LTG Time Frame 10-14   Long Term Goal see below goals    Plan   Treatment Interventions ADL retraining;Functional transfer training; Endurance training;Patient/family training;Equipment evaluation/education; Compensatory technique education; Energy conservation; Activityengagement   Goal Expiration Date 02/15/19   OT Frequency 3-5x/wk   Recommendation   OT Discharge Recommendation Home OT   Equipment Recommended Bedside commode   Barthel Index   Feeding 10   Bathing 0   Grooming Score 5   Dressing Score 5   Bladder Score 10   Bowels Score 10   Toilet Use Score 5   Transfers (Bed/Chair) Score 10   Mobility (Level Surface) Score 10   Stairs Score 0   Barthel Index Score 65   Modified [place] Scale   Modified Daniel Scale 4     LTG (10-14 DAYS)  Pt will demonstrate G carry over of spinal precautions t/o all ADL/IADL/leisure tasks w/o vc      Pt will improve activity tolerance to G for min 30-45 min txment sessions to increase participation in ADLs    Pt will complete ADLs/self care w/ mod I using adaptive device and DME as needed    Pt will complete toileting w/ mod I w/ G hygiene/thoroughness using DME as needed    Pt will improve functional transfers on/off all surfaces to mod I using DME as needed w/ G balance/safety  Pt will improve functional mobility during ADL/IADL/leisure tasks to mod I using DME as needed w/ G balance/safety  Pt will improve standing balance to G for 8-10 minutes of purposeful activity w/ G endurance      Pt will demonstrate G carryover of pt/caregiver education and training as appropriate w/ mod I w/o cues w/ good tolerance    Pt will demonstrate 100% carryover of energy conservation techniques w/ mod I t/o functional I/ADL/leisure tasks w/o cues s/p skilled education           Mandy Alfonso, OTR/L

## 2019-02-01 NOTE — ORTHOTIC NOTE
Orthotic Note            Date: 2/1/2019      Patient Name: Susan Hines        Time: 12:15pm    Reason for Consult:  Patient Active Problem List   Diagnosis    CAD (coronary artery disease)    [facility]    Carcinoma in situ of bladder    Essential hypertension    Hyperlipidemia    Presence of stent in coronary artery    Diabetic neuropathy, type II diabetes mellitus ([facility] )    CKD (chronic kidney disease) stage 3, GFR 30-59 ml/min (HCC)    Primary osteoarthritis of left knee    Malignant tumor of urinary bladder (HCC)    Urinary tract infection    Degeneration of lumbar intervertebral disc    Back pain   Large Lily [place] LSO     I measured patient for [facility] LSO while he was sitting up in bed  Instructions/adjustments reviewed x's 3  LSO is currently at bedside after education  The restorative team and I will continue to follow up daily  My contact information and instructions at bedside  Patient understands and has no further questions at this time  Recommendations:  Please call Mobility Coordinator at ext  [phone] in regards to bracing instruction and/or adjustment  Beth Oh Mobility Coordinator LCFo, LCOF, ASOP R  O T, O B T

## 2019-02-01 NOTE — PLAN OF CARE
Problem: OCCUPATIONAL THERAPY ADULT  Goal: Performs self-care activities at highest level of function for planned discharge setting  See evaluation for individualized goals  Treatment Interventions: ADL retraining, Functional transfer training, Endurance training, Patient/family training, Equipment evaluation/education, Compensatory technique education, Energy conservation, Activityengagement  Equipment Recommended: Bedside commode       See flowsheet documentation for full assessment, interventions and recommendations  Limitation: Decreased ADL status, Decreased endurance, Decreased self-care trans, Decreased high-level ADLs  Prognosis: Good  Assessment: Pt is a 74 y o  male who was [facility] to [facility] on 1/31/2019 with Back pain  MRI of spine revealed "Severe chronic disc and facet degenerative change throughout the lumbar spine, most prominent at L4-5 " Pt w/ active "up w/ assistance" orders and no order for spinal brace at time of eval  Pt's comorbidities include CAD, COPD, HLD, DM2, osteoarthritis of L knee, and CKD  At baseline pt was mostly I w/ ADLs, however he has required increased assist recently  Pt uses a RW to ambulate  Pt lives w/ spouse in [address] [facility] w/ 8 [place]  Currently pt requires S for UB ADLs, max A LB ASLs, and min Ax1 for functional mobility/transfers w/ RW  Pt currently presents with impairments in the following categories -steps to enter environment, difficulty performing ADLS, difficulty performing IADLS, activity tolerance, endurance, standing balance/tolerance and sitting balance/tolerance  These impairments, as well as pt's fatigue, pain, spinal precautions, orthopedic restricitions  and risk for falls  limit pt's ability to safely engage in all baseline areas of occupation, including bathing, dressing, toileting, functional mobility/transfers and leisure activities   From OT standpoint, anticipate pt will progress to be able to return home w/ increased family support and home OT upon D/C  Rec pt have BSC upon d/c as well  OT will continue to follow to address the below stated goals        OT Discharge Recommendation: Home OT

## 2019-02-01 NOTE — PLAN OF CARE
DISCHARGE PLANNING     Discharge to home or other facility with appropriate resources Progressing        Knowledge Deficit     Patient/family/caregiver demonstrates understanding of disease process, treatment plan, medications, and discharge instructions Progressing        MUSCULOSKELETAL - ADULT     Maintain or return mobility to safest level of function Progressing     Maintain proper alignment of affected body part Progressing        PAIN - ADULT     Verbalizes/displays adequate comfort level or baseline comfort level Progressing        Potential for Falls     Patient will remain free of falls Progressing        SAFETY ADULT     Patient will remain free of falls Progressing     Maintain or return to baseline ADL function Progressing     Maintain or return mobility status to optimal level Progressing

## 2019-02-01 NOTE — UTILIZATION REVIEW
Initial Clinical Review    Admission: Date/Time/Statement: 1/31/19 @ 1731     Orders Placed This Encounter   Procedures    Inpatient Admission     Standing Status:   Standing     Number of Occurrences:   1     Order Specific Question:   Admitting Physician     Answer:   Constanza Jeff     Order Specific Question:   Level of Care     Answer:   Med Surg [16]     Order Specific Question:   Estimated length of stay     Answer:   More than 2 Midnights     Order Specific Question:   Certification     Answer:   I certify that inpatient services are medically necessary for this patient for a duration of greater than two midnights  See H&P and MD Progress Notes for additional information about the patient's course of treatment  Date/Time/Mode of Arrival: 1/31 Direct Admission from Internal Medicine office    Chief Complaint: leg weakness    History of Illness: 74 y o  male who presents with  Intractable back pain with bilateral lower extremity weakness    Vital Signs:   Triage Vitals   Temperature Pulse Respirations Blood Pressure SpO2   01/31/19 1750 01/31/19 1750 01/31/19 1750 01/31/19 1750 01/31/19 1750   97 5 °F (36 4 °C) 64 18 164/79 99 %      Temp Source Heart Rate Source Patient Position - Orthostatic VS BP Location FiO2 (%)   01/31/19 2340 01/31/19 2340 01/31/19 2340 01/31/19 2340 --   Oral Monitor Lying Left arm       Pain Score       01/31/19 2205       No Pain        Wt Readings from Last 1 Encounters:   02/01/19 108 kg (238 lb 1 6 oz)     Vital Signs (abnormal): WNL    Pertinent Labs/Diagnostic Test Results:   MRI Lumbar Spine - Severe chronic disc and facet degenerative change throughout the lumbar spine, most prominent at L4-5 resulting in significant encroachment of the exiting L4 nerves and traversing L5 nerve roots  No evidence of acute disc herniation, epidural collection or hematoma  Bun/Cr = 33/ 1 62  Glucose = 328  H/H = 11 9/34 4    Past Medical/Surgical History:    Active Ambulatory Problems     Diagnosis Date Noted    CAD (coronary artery disease) 05/19/2011    Chronic obstructive pulmonary disease ([facility] ) 01/09/2004    Carcinoma in situ of bladder 08/16/2016    Essential hypertension 10/29/2015    Hyperlipidemia 10/29/2015    Presence of stent in coronary artery 08/16/2016    Diabetic neuropathy, type II diabetes mellitus ([facility] ) 01/09/2004    CKD (chronic kidney disease) stage 3, GFR 30-59 ml/min ([facility]) 04/26/2017    Primary osteoarthritis of left knee 03/16/2018    Malignant tumor of urinary bladder ([place] [address] ) 11/13/2014    Urinary tract infection 09/24/2015    Degeneration of lumbar intervertebral disc 12/03/2018     Resolved Ambulatory Problems     Diagnosis Date Noted    Puncture wound of foot, right 02/22/2017    Cellulitis and abscess of foot 02/22/2017    Right foot pain 04/21/2017    Cellulitis of right foot 04/21/2017    Cardiac disease     Knee hemarthrosis, left 02/15/2018     Past Medical History:   Diagnosis Date    Anemia     Arteriosclerotic cardiovascular disease     Bladder cancer ([place] [address] )     Cardiac disease     Diabetes mellitus ([place] [address] )     Hypertension     Hypotension     Insomnia     Other seasonal allergic rhinitis     Transient cerebral ischemia      Admitting Diagnosis: Bilateral leg weakness [R29 [id number]]     Age/Sex: 74 y o  male     Assessment/Plan:   74y Male, Direct admission with  Intractable back pain with bilateral lower extremity weakness  Patient reportedly had symptoms going back to this past August   Time he had left-sided knee surgery performed and subsequently noted worsening back pain at that time  In follow-up he had a epidural injection back in December after follow-up with 0 AA  He noted minimal improvement and subsequently had a repeat epidural performed approximately 2 days ago  He reports that this morning he has not been able to weight bear and has been noting worsening back pain  Titus Dominguez   He was sent to the hospital for further evaluation and imaging  Patient reports that he last voided this morning  He noted a increased urge to void however we did not feel like he completely void his bladder   He reports that he has chronic neuropathy in the lower extremities which is currently still at its baseline in terms of sensation and proprioception  Back pain    He had a repeat epidural injection this past Tuesday  · Patient reports that the back pain has continued  He also notes that he was unable to get up early this morning and move around  · Upon arrival to the hospital he is able to stand under his own volition however is extremely uncomfortable and requires assistance for stability  · Patient reports he last voided this morning  Will obtain a MRI of the back  · Follow up on bladder scan with serial bladder scans and neuro checks  · Consult Neurosurgery  · Stat MRI-discussed with MRI earlier  · No anticoagulation pending decision on MRI findings      CAD (coronary artery disease)    Continue with metoprolol  imdur  Hold Lasix pending decision on MRI findings  2/1 Per Neurosurgery:  Weakness in the left proximal muscle, L4 distribution 4-/5, right leg strength 5/5  Decreased LT and PP in the legs below knees bilaterally, difficulty standing and walking noted with left leg weakness, and gait instability, and radiculopathy to both LEs  DTR 2+ in upper extremities, and slightly depressed in both legs bilaterally  ?  Neurocheck:  Routine    Patient with progressive lower back pain and severe claudications of both legs, with more  weakness in the left  proximal muscles, gait instability and difficulty ambulation  No strong neurosurgical intervention indications at this juncture  Continue with PT/OT, PAIN MX, BRACE,and recommend APS evaluation treatment        Anticipated Length of Stay:  Patient will be admitted on an Inpatient basis with an anticipated length of stay of  greater than 2 midnights     Justification for Hospital Stay:  Needs further evaluation of back pain and lower extremity weakness          Admission Orders:  Neurological checks q4h  Spine Brace  Neurosurgery cons  PT/OT eval and treat    Scheduled Meds:   Current Facility-Administered Medications:  azelastine 1 spray Each Nare BID   bimatoprost 1 drop Both Eyes HS   fluocinonide  Topical BID   fluticasone 1 spray Nasal Daily   gabapentin 300 mg Oral BID   insulin glargine 20 Units Subcutaneous HS   insulin lispro 1-5 Units Subcutaneous TID AC   isosorbide mononitrate 30 mg Oral Daily   metoprolol succinate 100 mg Oral Daily   mirtazapine 15 mg Oral HS   mupirocin  Nasal Q12H JASMINE   pantoprazole 40 mg Oral Early Morning     Continuous Infusions:    PRN Meds: morphine injection

## 2019-02-01 NOTE — PLAN OF CARE
Problem: DISCHARGE PLANNING - CARE MANAGEMENT  Goal: Discharge to post-acute care or home with appropriate resources  INTERVENTIONS:  - Conduct assessment to determine patient/family and health care team treatment goals, and need for post-acute services based on payer coverage, community resources, and patient preferences, and barriers to discharge  - Address psychosocial, clinical, and financial barriers to discharge as identified in assessment in conjunction with the patient/family and health care team  - Arrange appropriate level of post-acute services according to patient's   needs and preference and payer coverage in collaboration with the physician and health care team  - Communicate with and update the patient/family, physician, and health care team regarding progress on the discharge plan  - Arrange appropriate transportation to post-acute venues  -Pt to be evaluated  Pt anticipates discharge to home    Outcome: Progressing

## 2019-02-01 NOTE — PLAN OF CARE
Problem: PHYSICAL THERAPY ADULT  Goal: Performs mobility at highest level of function for planned discharge setting  See evaluation for individualized goals  Treatment/Interventions: Functional transfer training, LE strengthening/ROM, Therapeutic exercise, Endurance training, Bed mobility, Gait training, Spoke to nursing, Spoke to case management, Spoke to advanced practitioner, OT  Equipment Recommended:  (Pt has RW, [facility])       See flowsheet documentation for full assessment, interventions and recommendations  Prognosis: Good  Problem List: Decreased strength, Impaired balance, Decreased endurance, Decreased mobility, Pain  Assessment: Pt is a 73 yo male presenting to [facility] from [address] [place] [address] on 1/31/19 with back pain and inability to ambulate  Lumbar MRI demonstrates: "Severe chronic disc and facet degenerative change throughout the lumbar spine, most prominent at L4-5 resulting in significant encroachment of the exiting L4 nerves and traversing L5 nerve roots " Per Dev neurosurgery PA-C, pt appropriate for OOB mobility and is without precautions  PMH is significant for: anemia, arteriosclerotic cardiovascular disease, bladder CA, DM, HTN, transient cerebral ischemia  Pt is supine at start of session and agreeable to therapy  Pt transferred supine <> sit with Hunter  Pt transferred sit <> stand with Hunter and RW  Pt ambulated 40 x2 ft with Hunter and RW, SBA x1 for chair follow for safety  Pt presents with overall slowed, shuffled eliecer with excessive forward flexion  Pt given cues for improved upright posture, he reports increased back pain and unable to correct posture  Pt required x1 seated rest break between ambulation trials  Pt remained seated in bedside chair with all needs within reach, educated on importance of frequent mobility while in house   Pt is not yet safe to D/C home, pt is anticipated safe to D/C home with home PT and family support pending improved ambulation endurance and stair trial  Barriers to Discharge: Inaccessible home environment     Recommendation: Home PT (Pending stair trial, improved ambulation endurance)     PT - OK to Discharge: No (Pending stair trial, improved ambulation endurance)    See flowsheet documentation for full assessment         Comments: Radha Angel, PT, DPT

## 2019-02-01 NOTE — CONSULTS
Consultation - Neurosurgery   [place] Charley 74 y o  male MRN: [id number]  Unit/Bed#: [facility] 474-85 Encounter: [id number]    Images reviewed at morning rounds on 02/01/2019 at 8:00 a m  Patient was seen and examined on 02/01/2019 at 9:00 a m  Inpatient consult to Neurosurgery  Consult performed by: Dominga Jackson ordered by: Marquis Eckert          Assessment/Plan               Assessment:  1  Low back pain with bilateral leg radiculopathy  2  Spinal stenosis  3  Neurogenic claudication  4  History of bladder cancer on chemotherapy  5  History of bilateral knee replacement  6  Status post  triple cardiac stent placement  7  History of diabetes with peripheral neuropathy  8  Hypertension  9  Status post bilateral rotator cuff repair      Plan:   · Exam:   GCS 15, A&OX3, EOMI, conjugate, SF, CROCKER, Finger to nose test normal without drift bilaterally  Motor strength :  Weakness in the left proximal muscle, L4 distribution 4-/5, right leg strength 5/5  Decreased LT and PP in the legs below knees bilaterally, difficulty standing and walking noted with left leg weakness, and gait instability, and radiculopathy to both LEs  DTR 2+ in upper extremities, and slightly depressed in both legs bilaterally  · Images: MRI of Lumbar spine on 01/31/19 demonstrates severe DJD throughout the lumbar spine, most significant at L4-5 with significant encroachment of exiting L4 nerve root and traversing  L5 nerve roots  · Pain control:  Per primary team  · Recommend APS evaluation and treatment  · Pain management  · DVT ppx:   · SCDs-bilateral legs  · Okay to start on pharmacological DVT prophylaxis from Neurosurgery standpoint  · Activity:  As tolerated  · PT/OT evaluation & treatment  · Brace:  LSO brace  Wear when upright and at> 45 degree head of bed    · Medical Mx:  Per primary team  · Neurocheck:  Routine  · Patient with progressive lower back pain and severe claudications of both legs, with more  weakness in the left proximal muscles, gait instability and difficulty ambulation  No strong neurosurgical intervention indications at this juncture  Continue with PT/OT, PAIN MX, LSO brace,and recommend APS evaluation and  treatment, consider [facility]  Consider OP discussion for possible posterior decompression and fusion  Call for concern or problem  HPI: Jaskaran Valencia is a [address] o  year old male admitted with progressive lower back pain, associated with  recent ambulatory difficulty associated with bilateral lower legs claudication, pain and weakness more in the left leg  Patient says he had left knee replacement and was doing PT in August 2018 when he gradually developed severe stabbing lower back pain,  got 1 [facility] injection at L1-2 (unsure) in August and had got relieved for 1-3 days  Patient got another  [facility] last Tuesday,  unfortunately his back pain and difficulty walking with claudication became worse   Patient denies  bowel or bladder issues, except 1 episode of urinary retention yesterday at the height  of severe back pain  Denies history of fall,  Does not like taking  pain medications  He had multiple bilateral knee replacement surgery especially on the right side  Denies history of back surgery  He has history of diabetes mellitus complicated  with peripheral neuropathies-including numbness and paresthesias in the lower extremities below the knees bilaterally  Has history of hypertension, status post triple cardiac stent in 2004, bladder cancer diagnosed in 1994- on chemotherapy  Denies history of stroke, seizure, bleeding disorder or taking anticoagulant medication except baby aspirin  Last dose of baby aspirin he also yesterday  Patient quit smoking many years ago, drinks alcohol occasionally  MRI of lumbar spine demonstrates severe DJD throughout the lumbar spine, most significant at L4-5 with significant encroachment of exiting L4 nerve root and traversing  L5 nerve roots          Review of Systems Constitutional: Negative for chills, fatigue and fever  HENT: Negative for trouble swallowing and voice change  Eyes: Negative for photophobia and visual disturbance  Respiratory: Negative for cough, chest tightness, shortness of breath and stridor  Cardiovascular: Negative for chest pain and palpitations  Gastrointestinal: Negative for nausea and vomiting  Genitourinary:        One episode of urinary retention   Musculoskeletal: Positive for back pain and gait problem  Negative for neck pain and neck stiffness  Skin: Negative for wound  Neurological: Positive for weakness and numbness  Negative for dizziness, tremors, seizures, syncope, facial asymmetry, speech difficulty, light-headedness and headaches  Hematological: Does not bruise/bleed easily  Psychiatric/Behavioral: Negative for confusion and decreased concentration  Historical Information   Past Medical History:   Diagnosis Date    Anemia     Last assessed: 9/28/17    Arteriosclerotic cardiovascular disease     Last assessed: 9/28/17    Bladder cancer ([facility] )     Cardiac disease     Diabetes mellitus ([facility] )     Hypertension     Hypotension     Last assessed: 2/24/15    Insomnia     Last assessed: 11/14/12    Other seasonal allergic rhinitis     Last assessed: 2/10/16    Transient cerebral ischemia      Past Surgical History:   Procedure Laterality Date    CARDIAC SURGERY      Cath stent placement  Last assessed: 3/9/17  Interventional Catheterization    CHOLECYSTECTOMY      CYSTOSCOPY      Diagnostic w/biopsy  Trice Torrez  Last assessed: 12/1/14    CYSTOURETHROSCOPY      w/cautery  Trice Torrez    GASTRIC BYPASS      For morbid obesity w/Shaji-en-Y   Resolved: 11/17/09    INCISION AND DRAINAGE OF WOUND Right 2/26/2017    Procedure: INCISION AND DRAINAGE (I&D) EXTREMITY WITH APPLICATION OF GRAFT JACKET;  Surgeon: Luis Fernando Christie DPM;  Location: [facility];  Service:    [address] Right 4/25/2017    Procedure: INCISION AND DRAINAGE (I&D) EXTREMITY, APPLICATION OF GRAFT;  Surgeon: Alessia Pires DPM;  Location: [facility];  Service:     JOINT REPLACEMENT      Knee   Last assessed: 1/28/11    VA CYSTOURETHROSCOPY,BIOPSY N/A 8/16/2016    Procedure: CYSTOSCOPY WITH BIOPSIES;  Surgeon: Heydi Alarcon MD;  [facility]: [facility];  Service: Urology    ROTATOR CUFF REPAIR      SMALL INTESTINE SURGERY      Surgery Shaji-en-Y    SPINAL FUSION      VAC DRESSING APPLICATION Right 3/97/0611    Procedure: APPLICATION VAC DRESSING;  Surgeon: Alessia Pires DPM;  Location: [facility];  Service:      History   Alcohol Use No     History   Drug Use No     History   Smoking Status    Former Smoker   Smokeless Tobacco    Never Used     Family History   Problem Relation Age of Onset    Diabetes Mother     Heart disease Mother     Other Mother         High blood pressure    Heart disease Father     Diabetes Sister     Other Sister         High blood pressure    Kidney disease Sister     Heart disease Brother     Other Brother         High blood pressure       Meds/Allergies   all current active meds have been reviewed and current meds:   Current Facility-Administered Medications   Medication Dose Route Frequency    azelastine (ASTELIN) 0 1 % nasal spray 1 spray  1 spray Each Nare BID    bimatoprost (LUMIGAN) 0 01 % ophthalmic solution 1 drop  1 drop Both Eyes HS    fluocinonide (LIDEX) 0 05 % cream   Topical BID    fluticasone (FLONASE) 50 mcg/act nasal spray 1 spray  1 spray Nasal Daily    gabapentin (NEURONTIN) capsule 300 mg  300 mg Oral BID    insulin glargine (LANTUS) subcutaneous injection 20 Units 0 2 mL  20 Units Subcutaneous HS    insulin lispro (HumaLOG) 100 units/mL subcutaneous injection 1-5 Units  1-5 Units Subcutaneous TID AC    isosorbide mononitrate (IMDUR) 24 hr tablet 30 mg  30 mg Oral Daily    metoprolol succinate (TOPROL-XL) 24 hr tablet 100 mg  100 mg Oral Daily  mirtazapine (REMERON) tablet 15 mg  15 mg Oral HS    morphine injection 2 mg  2 mg Intravenous Q4H PRN    mupirocin (BACTROBAN) 2 % nasal ointment   Nasal Q12H [address]    pantoprazole (PROTONIX) EC tablet 40 mg  40 mg Oral Early Morning     Allergies   Allergen Reactions    Atorvastatin Hives, Other (See Comments) and Itching     Pt  Reports "Hives"  Lipitor and Simvastatin    Simvastatin Rash     Other reaction(s): Edema, Other Reaction  "ALL STATINS"    Insulin Lispro Edema    Statins Itching    Other Other (See Comments), Itching and Rash     rash to electrodes  rash to electrodes and adhesives       Objective   I/O       01/30 0701 - 01/31 0700 01/31 0701 - 02/01 0700 02/01 0701 - 02/02 0700    P  O   400 0    Total Intake(mL/kg)  400 0 (0)    Urine (mL/kg/hr)  1175 250 (0 6)    Total Output   1175 250    Net   -775 -250           Unmeasured Urine Occurrence  1 x           Physical Exam   Constitutional: He is oriented to person, place, and time  He appears well-developed and well-nourished  HENT:   Head: Normocephalic and atraumatic  Eyes: Pupils are equal, round, and reactive to light  Conjunctivae and EOM are normal    Neck: Normal range of motion  Cardiovascular: Normal rate and regular rhythm  Pulmonary/Chest: Effort normal and breath sounds normal    Musculoskeletal:   Left leg proximal muscle weakness, which is decreased hip flexion and knee extension   Neurological: He is alert and oriented to person, place, and time  He has a normal Finger-Nose-Finger Test  GCS eye subscore is 4  GCS verbal subscore is 5  GCS motor subscore is 6  He displays no Babinski's sign on the right side  He displays no Babinski's sign on the left side  Reflex Scores:       Tricep reflexes are 2+ on the right side and 2+ on the left side  Bicep reflexes are 2+ on the right side and 2+ on the left side  Brachioradialis reflexes are 2+ on the right side and 2+ on the left side         Patellar reflexes are 1+ on the right side and 1+ on the left side  Achilles reflexes are 1+ on the right side and 1+ on the left side  Skin: Skin is warm  Psychiatric: His speech is normal      Neurologic Exam     Mental Status   Oriented to person, place, and time  Oriented to person  Oriented to place  Oriented to country, city and area  Oriented to time  Oriented to year, month and date  Registration: recalls 3 of 3 objects  Recall at 5 minutes: recalls 3 of 3 objects  Attention: normal  Concentration: normal    Speech: speech is normal   Knowledge: good  Able to perform simple calculations  Able to name object  Cranial Nerves     CN II   Visual fields full to confrontation  CN III, IV, VI   Pupils are equal, round, and reactive to light  Extraocular motions are normal    Right pupil: Size: 2 mm  Shape: regular  Left pupil: Size: 2 mm  Shape: regular     Nystagmus: none     CN V   Right facial sensation deficit: none  Left facial sensation deficit: none    CN VII   Right facial weakness: none  Left facial weakness: none    CN XI   CN XI normal      CN XII   CN XII normal      Motor Exam   Muscle bulk: normal  Overall muscle tone: normal  Right arm tone: normal  Left arm tone: normal  Right arm pronator drift: absent  Left arm pronator drift: absent  Right leg tone: normal  Left leg tone: normal    Sensory Exam   Light touch normal    Proprioception normal    Pinprick normal      Gait, Coordination, and Reflexes     Coordination   Finger to nose coordination: normal    Tremor   Resting tremor: absent    Reflexes   Right brachioradialis: 2+  Left brachioradialis: 2+  Right biceps: 2+  Left biceps: 2+  Right triceps: 2+  Left triceps: 2+  Right patellar: 1+  Left patellar: 1+  Right achilles: 1+  Left achilles: 1+  Right : 2+  Left : 2+  Right plantar: normal  Left plantar: normal  Right Hendricks: absent  Left Hendricks: absent  Right ankle clonus: absent  Left ankle clonus: absent      Vitals:Blood pressure 128/80, pulse 98, temperature 97 7 °F (36 5 °C), temperature source Oral, resp  rate 18, height 6' 4" (1 93 m), weight 108 kg (238 lb 1 6 oz), SpO2 98 %  ,Body mass index is 28 98 kg/m²  Lab Results:     Results from last 7 days  Lab Units 02/01/19  0503   WBC Thousand/uL 9 14  9 14   HEMOGLOBIN g/dL 11 9*  11 9*   HEMATOCRIT % 34 3*  34 3*   PLATELETS Thousands/uL 160  160   NEUTROS PCT % 70   MONOS PCT % 7       Results from last 7 days  Lab Units 02/01/19  0503   POTASSIUM mmol/L 4 1  4 1   CHLORIDE mmol/L 102  103   CO2 mmol/L 26  26   BUN mg/dL 30*  33*   CREATININE mg/dL 1 62*  1 59*   CALCIUM mg/dL 8 7  8 7   ALK PHOS U/L 81  83   ALT U/L 86*  85*   AST U/L 42  44       Results from last 7 days  Lab Units 02/01/19  0503   MAGNESIUM mg/dL 2 1       Results from last 7 days  Lab Units 02/01/19  0503   PHOSPHORUS mg/dL 3 1         No results found for: TROPONINT  ABG:No results found for: PHART, OEP6SIV, PO2ART, RNQ8QKW, Q4RYCYDP, BEART, SOURCE    Imaging Studies: I have personally reviewed pertinent reports  and I have personally reviewed pertinent films in PACS    EKG, Pathology, and Other Studies: I have personally reviewed pertinent reports  and I have personally reviewed pertinent films in PACS    VTE Prophylaxis: Sequential compression device (Venodyne)     Code Status: Level 1 - Full Code  Advance Directive and Living Will:      Power of :    POLST:      Counseling / Coordination of Care  I spent 20 minutes with the patient

## 2019-02-01 NOTE — PHYSICAL THERAPY NOTE
Physical Therapy Evaluation     Patient's Name: Yesenia Hernandez    Admitting Diagnosis  Bilateral leg weakness [R29 [id number]]    Problem List  Patient Active Problem List   Diagnosis    CAD (coronary artery disease)    Chronic obstructive pulmonary disease ([facility] )    Carcinoma in situ of bladder    Essential hypertension    Hyperlipidemia    Presence of stent in coronary artery    Diabetic neuropathy, type II diabetes mellitus ([place] [address] )    CKD (chronic kidney disease) stage 3, GFR 30-59 ml/min ([facility])    Primary osteoarthritis of left knee    Malignant tumor of urinary bladder ([place] [address] )    Urinary tract infection    Degeneration of lumbar intervertebral disc    Back pain       Past Medical History  Past Medical History:   Diagnosis Date    Anemia     Last assessed: 9/28/17    Arteriosclerotic cardiovascular disease     Last assessed: 9/28/17    Bladder cancer ([place] [address] )     Cardiac disease     Diabetes mellitus ([place] [address] )     Hypertension     Hypotension     Last assessed: 2/24/15    Insomnia     Last assessed: 11/14/12    Other seasonal allergic rhinitis     Last assessed: 2/10/16    Transient cerebral ischemia        Past Surgical History  Past Surgical History:   Procedure Laterality Date    CARDIAC SURGERY      Cath stent placement  Last assessed: 3/9/17  Interventional Catheterization    CHOLECYSTECTOMY      CYSTOSCOPY      Diagnostic w/biopsy  Tana Walters  Last assessed: 12/1/14    CYSTOURETHROSCOPY      w/cautery  Tana Walters    GASTRIC BYPASS      For morbid obesity w/Shaji-en-Y   Resolved: 11/17/09    INCISION AND DRAINAGE OF WOUND Right 2/26/2017    Procedure: INCISION AND DRAINAGE (I&D) EXTREMITY WITH APPLICATION OF GRAFT JACKET;  Surgeon: Oralia Majano DPM;  [facility];  Service:     INCISION AND DRAINAGE OF WOUND Right 4/25/2017    Procedure: INCISION AND DRAINAGE (I&D) EXTREMITY, APPLICATION OF GRAFT;  Surgeon: Oralia Majano DPM;  [facility];  Service:     JOINT REPLACEMENT      Knee  Last assessed: 1/28/11    [place] CYSTOURETHROSCOPY,BIOPSY N/A 8/16/2016    Procedure: CYSTOSCOPY WITH BIOPSIES;  Surgeon: Mann Marshall MD;  Location:  MAIN OR;  Service: Urology    ROTATOR CUFF REPAIR      SMALL INTESTINE SURGERY      Surgery Shaji-en-Y    SPINAL FUSION      VAC DRESSING APPLICATION Right 1/07/8875    Procedure: APPLICATION VAC DRESSING;  Surgeon: Graciela Lee DPM;  [facility];  Service:       02/01/19 1027   Note Type   Note type Eval only   Pain Assessment   Pain Assessment 0-10   Pain Score Worst Possible Pain  (At rest 0/10, 10/10 with movement)   Pain Type Acute pain   Pain Location Leg   Pain Orientation Left   Patient's Stated Pain Goal No pain   Hospital Pain Intervention(s) Repositioned; Ambulation/increased activity  (Medical team notified- Magalie Duffy)   Home Living   Type of Home House  (8 ADILENE)   Home Layout Two level;Bed/bath upstairs;1/2 bath on main level; Able to live on main level with bedroom/bathroom   Home Equipment Walker;Cane  (Pt reports unable to ambulate without AD PTA)   Prior Function   Level of Gray Needs assistance with ADLs and functional mobility   Lives With Spouse   Receives Help From Family   ADL Assistance Needs assistance   IADLs Needs assistance   Falls in the last 6 months 0   Comments Pt reports requiring increased assistance from wife for ADLs prior to admission  He reports he was crawling up the steps and unable to ambulate  Restrictions/Precautions   Weight Bearing Precautions Per Order No   Other Precautions Telemetry; Fall Risk;Pain   General   Family/Caregiver Present No   Cognition   Overall Cognitive Status WFL   Arousal/Participation Alert   Orientation Level Oriented X4   Memory Within functional limits   Following Commands Follows all commands and directions without difficulty   RLE Assessment   RLE Assessment WFL   Strength LLE   L Hip Flexion 3/5   L Knee Extension 5/5   L Ankle Dorsiflexion 4/5 L Ankle Plantar Flexion 4/5   Light Touch   RLE Light Touch Grossly intact   LLE Light Touch Grossly intact   Bed Mobility   Supine to Sit 4  Minimal assistance   Additional items Assist x 1; Increased time required;Verbal cues   Transfers   Sit to Stand 4  Minimal assistance   Additional items Assist x 1; Increased time required;Verbal cues   Stand to Sit 4  Minimal assistance   Additional items Assist x 1; Increased time required;Verbal cues   Ambulation/Elevation   Gait pattern Improper Weight shift; Antalgic;Decreased foot clearance;Shuffling; Short stride; Step to;Excessively slow   Gait Assistance 4  Minimal assist   Additional items Assist x 1  (SBA x1 for chair follow)   Assistive Device Rolling walker   Distance 40 ft x2  (x1 seated rest break)   Balance   Static Sitting Fair +   Dynamic Sitting Fair   Static Standing Fair   Dynamic Standing Poor +   Ambulatory Poor +   Endurance Deficit   Endurance Deficit Yes   Endurance Deficit Description Easily fatigued, pain   Activity Tolerance   Activity Tolerance Patient limited by fatigue;Patient limited by pain   Medical Staff Made Aware Jasmin Flynn PA-C   Nurse Made Aware Yes Faisal   Assessment   Prognosis Good   Problem List Decreased strength; Impaired balance;Decreased endurance;Decreased mobility;Pain   Assessment Pt is a 73 yo male presenting to [facility] with back pain and inability to ambulate  Lumbar MRI demonstrates: "Severe chronic disc and facet degenerative change throughout the lumbar spine, most prominent at L4-5 resulting in significant encroachment of the exiting L4 nerves and traversing L5 nerve roots " Per Dev neurosurgery BO, pt appropriate for OOB mobility and is without precautions  PMH is significant for: anemia, arteriosclerotic cardiovascular disease, bladder CA, DM, HTN, transient cerebral ischemia  Pt is supine at start of session and agreeable to therapy  Pt transferred supine <> sit with Hunter   Pt transferred sit <> stand with Hunter and RW  Pt ambulated 40 x2 ft with Hunter and RW, SBA x1 for chair follow for safety  Pt presents with overall slowed, shuffled eliecer with excessive forward flexion  Pt given cues for improved upright posture, he reports increased back pain and unable to correct posture  Pt required x1 seated rest break between ambulation trials  Pt remained seated in bedside chair with all needs within reach, educated on importance of frequent mobility while in house  Pt is not yet safe to D/C home, pt is anticipated safe to D/C home with home PT and family support pending improved ambulation endurance and stair trial     Barriers to Discharge Inaccessible home environment   Goals   Patient Goals To decrease pain   STG Expiration Date 02/11/19   Short Term Goal #1 In 10 sessions pt will: 1  Transfer supine <> sit independently  2  Transfer sit <> stand with modified independence and LRAD  3  Ambulate >100 ft with modified independence and LRAD  4  Navigate 8 steps with supervision  5  Perform LE ther-ex program    Treatment Day 0   Plan   Treatment/Interventions Functional transfer training;LE strengthening/ROM; Therapeutic exercise; Endurance training;Bed mobility;Gait training;Spoke to nursing;Spoke to case management;Spoke to advanced practitioner;OT   PT Frequency (3-5x/wk)   Recommendation   Recommendation Home PT  (Pending stair trial, improved ambulation endurance)   Equipment Recommended (Pt has RW, SPC)   PT - OK to Discharge No  (Pending stair trial, improved ambulation endurance)   Modified Daniel Scale   Modified Daniel Scale 4   Barthel Index   Feeding 10   Bathing 0   Grooming Score 5   Dressing Score 10   Bladder Score 10   Bowels Score 10   Toilet Use Score 5   Transfers (Bed/Chair) Score 10   Mobility (Level Surface) Score 10   Stairs Score 0   Barthel Index Score 70       Myah Jimenez, PT, DPT

## 2019-02-01 NOTE — SOCIAL WORK
CM met with the patient and wife, Uche Flores at bedside to review the CM role and discuss possible dc needs  CM obtained verbal permission from pt to speak with wife present  Pt is AAOx4 and lives with his wife in a 2 story home with 16 [address] and 18 steps to the 2nd floor in [place] [address]  Pt has a walk in shower in basement that he uses and expresses that he would like a shower chair as well  Pt is ambulatory with a walker and is [place]PT[place] and able to drive self  Pt has had VNA in 2017 but does not remember with who and states that more than 5 years ago had an admission to [facility] after [facility] surgery  States he has had x4 knee replacement surgeries to date, the last being in 2018  Pt is retired, does not have a living will or POA  Denies any history of drug or ETOH abuse, mental illness or inpatient psychiatric admissions  Preferred contact: WifeUche [phone]  Pharmacy: [facility] [address] [facility] in [facility]  PCP: Dr Cm Templeton  CM reviewed d/c planning process including the following: identifying help at home, patient preference for d/c planning needs, Discharge Lounge, [facility] Meds to Bed program, availability of treatment team to discuss questions or concerns patient and/or family may have regarding understanding medications and recognizing signs and symptoms once discharged  CM also encouraged patient to follow up with all recommended appointments after discharge  Patient advised of importance for patient and family to participate in managing patients medical well being

## 2019-02-02 ENCOUNTER — APPOINTMENT (INPATIENT)
Dept: NON INVASIVE DIAGNOSTICS | Facility: HOSPITAL | Age: 74
DRG: 552 | End: 2019-02-02
Payer: MEDICARE

## 2019-02-02 LAB
ALBUMIN SERPL BCP-MCNC: 3.5 G/DL (ref 3.5–5)
ALP SERPL-CCNC: 89 U/L (ref 46–116)
ALT SERPL W P-5'-P-CCNC: 113 U/L (ref 12–78)
ANION GAP SERPL CALCULATED.3IONS-SCNC: 7 MMOL/L (ref 4–13)
AST SERPL W P-5'-P-CCNC: 47 U/L (ref 5–45)
BASOPHILS # BLD AUTO: 0.01 THOUSANDS/ΜL (ref 0–0.1)
BASOPHILS NFR BLD AUTO: 0 % (ref 0–1)
BILIRUB SERPL-MCNC: 0.49 MG/DL (ref 0.2–1)
BUN SERPL-MCNC: 32 MG/DL (ref 5–25)
CALCIUM SERPL-MCNC: 8.9 MG/DL (ref 8.3–10.1)
CHLORIDE SERPL-SCNC: 101 MMOL/L (ref 100–108)
CO2 SERPL-SCNC: 26 MMOL/L (ref 21–32)
CREAT SERPL-MCNC: 1.53 MG/DL (ref 0.6–1.3)
EOSINOPHIL # BLD AUTO: 0 THOUSAND/ΜL (ref 0–0.61)
EOSINOPHIL NFR BLD AUTO: 0 % (ref 0–6)
ERYTHROCYTE [DISTWIDTH] IN BLOOD BY AUTOMATED COUNT: 12.6 % (ref 11.6–15.1)
GFR SERPL CREATININE-BSD FRML MDRD: 51 ML/MIN/1.73SQ M
GLUCOSE SERPL-MCNC: 280 MG/DL (ref 65–140)
GLUCOSE SERPL-MCNC: 318 MG/DL (ref 65–140)
GLUCOSE SERPL-MCNC: 322 MG/DL (ref 65–140)
GLUCOSE SERPL-MCNC: 341 MG/DL (ref 65–140)
GLUCOSE SERPL-MCNC: 346 MG/DL (ref 65–140)
GLUCOSE SERPL-MCNC: 366 MG/DL (ref 65–140)
GLUCOSE SERPL-MCNC: 441 MG/DL (ref 65–140)
GLUCOSE SERPL-MCNC: >500 MG/DL (ref 65–140)
HCT VFR BLD AUTO: 36.3 % (ref 36.5–49.3)
HGB BLD-MCNC: 12.7 G/DL (ref 12–17)
IMM GRANULOCYTES # BLD AUTO: 0.06 THOUSAND/UL (ref 0–0.2)
IMM GRANULOCYTES NFR BLD AUTO: 1 % (ref 0–2)
LYMPHOCYTES # BLD AUTO: 1.41 THOUSANDS/ΜL (ref 0.6–4.47)
LYMPHOCYTES NFR BLD AUTO: 17 % (ref 14–44)
MCH RBC QN AUTO: 32.2 PG (ref 26.8–34.3)
MCHC RBC AUTO-ENTMCNC: 35 G/DL (ref 31.4–37.4)
MCV RBC AUTO: 92 FL (ref 82–98)
MONOCYTES # BLD AUTO: 0.39 THOUSAND/ΜL (ref 0.17–1.22)
MONOCYTES NFR BLD AUTO: 5 % (ref 4–12)
NEUTROPHILS # BLD AUTO: 6.38 THOUSANDS/ΜL (ref 1.85–7.62)
NEUTS SEG NFR BLD AUTO: 77 % (ref 43–75)
NRBC BLD AUTO-RTO: 0 /100 WBCS
PLATELET # BLD AUTO: 165 THOUSANDS/UL (ref 149–390)
PMV BLD AUTO: 10.8 FL (ref 8.9–12.7)
POTASSIUM SERPL-SCNC: 4.4 MMOL/L (ref 3.5–5.3)
PROT SERPL-MCNC: 7.1 G/DL (ref 6.4–8.2)
RBC # BLD AUTO: 3.94 MILLION/UL (ref 3.88–5.62)
SODIUM SERPL-SCNC: 134 MMOL/L (ref 136–145)
WBC # BLD AUTO: 8.25 THOUSAND/UL (ref 4.31–10.16)

## 2019-02-02 PROCEDURE — 99222 1ST HOSP IP/OBS MODERATE 55: CPT | Performed by: INTERNAL MEDICINE

## 2019-02-02 PROCEDURE — 93306 TTE W/DOPPLER COMPLETE: CPT

## 2019-02-02 PROCEDURE — 99232 SBSQ HOSP IP/OBS MODERATE 35: CPT | Performed by: NURSE PRACTITIONER

## 2019-02-02 PROCEDURE — 82948 REAGENT STRIP/BLOOD GLUCOSE: CPT

## 2019-02-02 PROCEDURE — 93306 TTE W/DOPPLER COMPLETE: CPT | Performed by: INTERNAL MEDICINE

## 2019-02-02 PROCEDURE — 97535 SELF CARE MNGMENT TRAINING: CPT | Performed by: STUDENT IN AN ORGANIZED HEALTH CARE EDUCATION/TRAINING PROGRAM

## 2019-02-02 PROCEDURE — 80053 COMPREHEN METABOLIC PANEL: CPT | Performed by: NURSE PRACTITIONER

## 2019-02-02 PROCEDURE — 97530 THERAPEUTIC ACTIVITIES: CPT | Performed by: STUDENT IN AN ORGANIZED HEALTH CARE EDUCATION/TRAINING PROGRAM

## 2019-02-02 PROCEDURE — 85025 COMPLETE CBC W/AUTO DIFF WBC: CPT | Performed by: NURSE PRACTITIONER

## 2019-02-02 PROCEDURE — 99232 SBSQ HOSP IP/OBS MODERATE 35: CPT | Performed by: PHYSICIAN ASSISTANT

## 2019-02-02 RX ORDER — INSULIN GLARGINE 100 [IU]/ML
55 INJECTION, SOLUTION SUBCUTANEOUS
Status: DISCONTINUED | OUTPATIENT
Start: 2019-02-02 | End: 2019-02-02

## 2019-02-02 RX ORDER — HEPARIN SODIUM 5000 [USP'U]/ML
5000 INJECTION, SOLUTION INTRAVENOUS; SUBCUTANEOUS EVERY 8 HOURS SCHEDULED
Status: DISPENSED | OUTPATIENT
Start: 2019-02-02 | End: 2019-02-04

## 2019-02-02 RX ADMIN — DEXAMETHASONE 2 MG: 2 TABLET ORAL at 17:42

## 2019-02-02 RX ADMIN — GABAPENTIN 300 MG: 300 CAPSULE ORAL at 09:06

## 2019-02-02 RX ADMIN — INSULIN LISPRO 5 UNITS: 100 INJECTION, SOLUTION INTRAVENOUS; SUBCUTANEOUS at 12:55

## 2019-02-02 RX ADMIN — SODIUM CHLORIDE 10 UNITS/HR: 9 INJECTION, SOLUTION INTRAVENOUS at 18:59

## 2019-02-02 RX ADMIN — DEXAMETHASONE 2 MG: 2 TABLET ORAL at 12:55

## 2019-02-02 RX ADMIN — BIMATOPROST 1 DROP: 0.1 SOLUTION/ DROPS OPHTHALMIC at 21:05

## 2019-02-02 RX ADMIN — MIRTAZAPINE 15 MG: 15 TABLET, FILM COATED ORAL at 21:06

## 2019-02-02 RX ADMIN — ISOSORBIDE MONONITRATE 30 MG: 30 TABLET, EXTENDED RELEASE ORAL at 09:06

## 2019-02-02 RX ADMIN — INSULIN LISPRO 4 UNITS: 100 INJECTION, SOLUTION INTRAVENOUS; SUBCUTANEOUS at 09:05

## 2019-02-02 RX ADMIN — GABAPENTIN 300 MG: 300 CAPSULE ORAL at 17:42

## 2019-02-02 RX ADMIN — PANTOPRAZOLE SODIUM 40 MG: 40 TABLET, DELAYED RELEASE ORAL at 06:08

## 2019-02-02 RX ADMIN — DEXAMETHASONE 2 MG: 2 TABLET ORAL at 06:08

## 2019-02-02 RX ADMIN — HEPARIN SODIUM 5000 UNITS: 5000 INJECTION INTRAVENOUS; SUBCUTANEOUS at 15:07

## 2019-02-02 RX ADMIN — METOPROLOL SUCCINATE 100 MG: 100 TABLET, FILM COATED, EXTENDED RELEASE ORAL at 09:06

## 2019-02-02 RX ADMIN — DEXAMETHASONE 2 MG: 2 TABLET ORAL at 23:11

## 2019-02-02 RX ADMIN — HEPARIN SODIUM 5000 UNITS: 5000 INJECTION INTRAVENOUS; SUBCUTANEOUS at 21:06

## 2019-02-02 NOTE — ASSESSMENT & PLAN NOTE
Lab Results   Component Value Date    HGBA1C 8 4 (H) 11/01/2018       Recent Labs      02/01/19   0742  02/01/19   1155  02/01/19   1710  02/01/19   2100   POCGLU  264*  381*  424*  >500*       Blood Sugar Average: Last 72 hrs:  (P) 714 [id number]   Elevated pending neurosx final plan   Pt did not receive full lantus dosing last night and not on diabetic diet  Changed to ada diet   Increased lantus to home dose at this time   However, with plan to start 2mg dexamethasone at mn will need tight monitoring   May need to consider insulin drip

## 2019-02-02 NOTE — ASSESSMENT & PLAN NOTE
Continue with metoprolol  Imdur, will have cards input on whether to hold pending final plan per neurosx   Held lasix with mildly elevated creatinine 1 84 now will start low dose iv fluids 1 liter at this time

## 2019-02-02 NOTE — OCCUPATIONAL THERAPY NOTE
02/02/19 1516   Restrictions/Precautions   Weight Bearing Precautions Per Order No   Other Precautions Fall Risk;Pain;Telemetry   Pain Assessment   Pain Assessment 0-10   Pain Score 9   ADL   Where Assessed Chair   LB Dressing Assistance 5  Supervision/Setup   LB Dressing Deficit Setup;Use of adaptive equipment   LB Dressing Comments don/doff socks with [address]  5  Supervision/Setup   Toileting Deficit Bedside commode; Increased time to complete   Toileting Comments BM   Transfers   Sit to Stand 5  Supervision   Stand to Sit 5  Supervision   Stand pivot 4  Minimal assistance   Additional items Assist x 1   Toilet transfer 5  Supervision   Functional Mobility   Functional Mobility 4  Minimal assistance   Additional Comments Ax1 CGA   Additional items Rolling walker   Cognition   Overall Cognitive Status WFL   Arousal/Participation Alert   Attention Within functional limits   Orientation Level Oriented X4   Memory Within functional limits   Following Commands Follows all commands and directions without difficulty   Activity Tolerance   Activity Tolerance Patient tolerated treatment well   Assessment   Assessment Pt participates in OT session with focus on LB dressing, toileting, transfers, and activity tolerance to increase I for d/c  Pt setup LB dressing to don/doff socks and pants  Pt also trialed used of LHAE and able to don/doff socks with setup  Family present for part of session  Pt reviewed donning/doffing of LSO  Pt setup with commode over toilet this session  Pt supervision toileting for BM and able to manage toilet hygiene  Pt CGA functional mobility with RW into hallway and pt noted for increased pain in LE  Pt requires standing rest breaks at times  Pt will continue to benefit from activity tolerance, adls, and transfers  Plan   Treatment Interventions ADL retraining;Functional transfer training; Activityengagement; Endurance training   Goal Expiration Date 02/15/19   Treatment Day 1   OT Frequency 3-5x/wk   Recommendation   OT Discharge Recommendation Home OT

## 2019-02-02 NOTE — PROGRESS NOTES
Progress Note - Hetal Mosqueda 1945, 74 y o  male MRN: [id number]    Unit/Bed#: [facility] 406-09 Encounter: [id number]    Primary Care Provider: Tonja Mike MD   Date and time admitted to hospital: 1/31/2019  5:33 PM        * Back pain   Assessment & Plan    · Patient reports worsening back pain since August of this past year  Patient noted that after his recent knee left-sided knee surgery he was having continued symptoms of back pain  He subsequently was sent for an epidural injection back in December with minimal improvement of symptoms  In follow-up he had a repeat epidural injection this past Tuesday  · Patient reports that the back pain has continued  He also notes that he was unable to get up early morning of admission  · Upon arrival to the hospital he was able to stand under his own volition however is extremely uncomfortable and requires assistance for stability  Today some improvement with PT walked in hallway however pt reports extremely uncomfortable /painful   · Patient reports he last voided this morning  Will obtain a MRI of the back  · Follow up on bladder scan with serial bladder scans and neuro checks  · Appreciate Neurosurgery, discussed with PA this am however was pending attending note just placed for final plan   · MRi lumbar spine: 1   Severe chronic disc and facet degenerative change throughout the lumbar spine, most prominent at L4-5 resulting in significant encroachment of the exiting L4 nerves and traversing L5 nerve roots  2   No evidence of acute disc herniation, epidural collection or hematoma  · Will discuss plan of care tomorrow with neurosx attending just placed recs and considering surgery need cardiac clearance   Hold AC or proph at this time   · Will consult cards for clearance   · Start at mn 2mg dexamethasone q6 hrs (will need to trend bs may need to consider insulin drip to catch up on bs) especially since only received 20 units last night      Diabetes type 2, uncontrolled [facility])   Assessment & Plan    Lab Results   Component Value Date    HGBA1C 8 4 (H) 11/01/2018       Recent Labs      02/01/19   0742  02/01/19   1155  02/01/19   1710  02/01/19   2100   POCGLU  264*  381*  424*  >500*       Blood Sugar Average: Last 72 hrs:  (P) 157 [id number]   Elevated pending neurosx final plan   Pt did not receive full lantus dosing last night and not on diabetic diet  Changed to ada diet   Increased lantus to home dose at this time   However, with plan to start 2mg dexamethasone at mn will need tight monitoring   May need to consider insulin drip      Essential hypertension   Assessment & Plan    Metoprolol     Chronic obstructive pulmonary disease ([facility] )   Assessment & Plan    Appears to be chronic  No acute exacerbation  CAD (coronary artery disease)   Assessment & Plan    Continue with metoprolol  Imdur, will have cards input on whether to hold pending final plan per neurosx   Held lasix with mildly elevated creatinine 1 84 now will start low dose iv fluids 1 liter at this time        Carcinoma in situ of bladder   Assessment & Plan    Patient with known history of bladder CA  Outpatient follow-up  Monitor renal function trending up slightly will give one liter ivf with hyperglycemia          VTE Pharmacologic Prophylaxis:   Pharmacologic: Pharmacologic VTE Prophylaxis contraindicated due to possible surgery  Mechanical VTE Prophylaxis in Place: Yes    Patient Centered Rounds: I have performed bedside rounds with nursing staff today  Discussions with Specialists or Other Care Team Provider: nursing     Education and Discussions with Family / Patient: patient     Time Spent for Care: 45 minutes  More than 50% of total time spent on counseling and coordination of care as described above      Current Length of Stay: 1 day(s)    Current Patient Status: Inpatient   Certification Statement: The patient will continue to require additional inpatient hospital stay due to ongoing treatment and workup possible surgery    Discharge Plan: will depend on if pt has surgery pending final plan based on symptom relief     Code Status: Level 1 - Full Code      Subjective:   Pt is with pain especially when walking however pt reports    Objective:     Vitals:   Temp (24hrs), Av 7 °F (36 5 °C), Min:97 7 °F (36 5 °C), Max:97 7 °F (36 5 °C)    Temp:  [97 7 °F (36 5 °C)] 97 7 °F (36 5 °C)  HR:  [66-98] 67  Resp:  [16-20] 20  BP: (128-160)/(80-83) 138/83  SpO2:  [97 %-98 %] 97 %  Body mass index is 28 98 kg/m²  Input and Output Summary (last 24 hours): Intake/Output Summary (Last 24 hours) at 19  Last data filed at 19 2142   Gross per 24 hour   Intake              900 ml   Output             2750 ml   Net            -1850 ml       Physical Exam:     Physical Exam   Constitutional: He is oriented to person, place, and time  HENT:   Head: Normocephalic and atraumatic  Eyes: Conjunctivae are normal  Right eye exhibits no discharge  Left eye exhibits no discharge  No scleral icterus  Neck: No JVD present  No tracheal deviation present  No thyromegaly present  Cardiovascular: Exam reveals no gallop  No murmur heard  Pulmonary/Chest: No stridor  No respiratory distress  He has no wheezes  He has no rales  He exhibits no tenderness  Abdominal: Soft  He exhibits no distension and no mass  There is no tenderness  There is no rebound and no guarding  Musculoskeletal: He exhibits no edema, tenderness or deformity  Lymphadenopathy:     He has no cervical adenopathy  Neurological: He is oriented to person, place, and time  Skin: No rash noted  No erythema  No pallor           Additional Data:     Labs:      Results from last 7 days  Lab Units 19  0503   WBC Thousand/uL 9 14  9 14   HEMOGLOBIN g/dL 11 9*  11 9*   HEMATOCRIT % 34 3*  34 3*   PLATELETS Thousands/uL 160  160   NEUTROS PCT % 70   LYMPHS PCT % 22   MONOS PCT % 7   EOS PCT % 0       Results from last 7 days  Lab Units 02/01/19 2124 02/01/19  0503   SODIUM mmol/L 131* 136  136   POTASSIUM mmol/L 4 4 4 1  4 1   CHLORIDE mmol/L 98* 102  103   CO2 mmol/L 24 26  26   BUN mg/dL 38* 30*  33*   CREATININE mg/dL 1 84* 1 62*  1 59*   ANION GAP mmol/L 9 8  7   CALCIUM mg/dL 8 6 8 7  8 7   ALBUMIN g/dL  --  3 3*  3 4*   TOTAL BILIRUBIN mg/dL  --  0 53  0 53   ALK PHOS U/L  --  81  83   ALT U/L  --  86*  85*   AST U/L  --  42  44   GLUCOSE RANDOM mg/dL 598* 328*  336*           Results from last 7 days  Lab Units 02/01/19  2100 02/01/19  1710 02/01/19  1155 02/01/19  0742 01/31/19  2113 01/31/19  1757   POC GLUCOSE mg/dl >500* 424* 381* 264* 285* 315*       Results from last 7 days  Lab Units 02/01/19 2124   HEMOGLOBIN A1C % 8 8*               * I Have Reviewed All Lab Data Listed Above  * Additional Pertinent Lab Tests Reviewed:  All Labs Within Last 24 Hours Reviewed    Imaging:    Imaging Reports Reviewed Today Include: reviewed     Recent Cultures (last 7 days):           Last 24 Hours Medication List:     Current Facility-Administered Medications:  azelastine 1 spray Each Nare BID Hetul Cody, DO   bimatoprost 1 drop Both Eyes HS Hetul Cody, DO   [START ON 2/2/2019] dexamethasone 2 mg Oral Q6H [facility] EVANGELINA Richards   fluocinonide  Topical BID Hetul Cody, DO   fluticasone 1 spray Nasal Daily Hetul Cody, DO   gabapentin 300 mg Oral BID Hetul Cody, DO   insulin glargine 45 Units Subcutaneous HS EVANGELINA Gill   insulin lispro 1-5 Units Subcutaneous TID AC Hetul Cody, DO   isosorbide mononitrate 30 mg Oral Daily Hetul Cody, DO   metoprolol succinate 100 mg Oral Daily Hetul Cody, DO   mirtazapine 15 mg Oral HS Hetul Cody, DO   morphine injection 2 mg Intravenous Q4H PRN Hetul Cody, DO   mupirocin  Nasal Q12H [facility] Hetul Cody, DO   pantoprazole 40 mg Oral Early Morning Hetul Cody, DO   sodium chloride 1,000 mL Intravenous Once EVANGELINA Gill        Today, Patient Was Seen By: EVANGELINA Madison    ** Please Note: Dictation voice to text software may have been used in the creation of this document   **

## 2019-02-02 NOTE — PROGRESS NOTES
Called by RN regarding BG > 500 x 2  Will initiate insulin gtt and d/c SQ insulin  Obtain endocrine consult  A1C noted at 8 8  Steroids started given lumbar stenosis likely contributing to hyperglycemia  Monitor BG Q2 hours  [facility]

## 2019-02-02 NOTE — PROGRESS NOTES
Progress Note - Neurosurgery   Paul Marin 74 y o  male MRN: [id number]  Unit/Bed#: [facility] 618-01 Encounter: [id number]    Assessment:  1  Low back pain with bilateral leg radiculopathy  2  Spinal stenosis  3  Neurogenic claudication  4  History of bladder cancer on chemotherapy  5  History of bilateral knee replacement  6  Status post triple cardiac stent placement  7  History of diabetes with peripheral neuropathy  8  Hypertension  9  Status post bilateral rotator cuff repair    Plan:  · Exam:  Alert and oriented x3, moving all extremities with some weakness noted in LLE > RLE, slight in distal BLE, numbness to pinprick in bilateral feet which is chronic but worsening, JPS 3/3 at right ankle and left toe, DST intact, no midline or paraspinal tenderness  · Imaging reviewed personally and by attending  Results are as follows:  · MRI lumbar spine without contrast 01/31/2019:  Severe chronic disc and facet degenerative change throughout the lumbar spine, most prominent at L4-5 resulting in significant encroachment of the exiting L4 nerves and traversing L5 nerve roots  No evidence of acute disc herniation, epidural collection or hematoma    · Cardiology following, appreciate input  · Consult and for cardiac clearance for possible surgical intervention if warranted  · Recommend checking EKG  · Recommend continuing home medications including aspirin, metoprolol, and isosorbide mononitrate  · According to revised cardiac risk index patient is at least moderate risk for surgical procedure however if emergent surgery is required patient would be appropriate at that time  · Expresses desire to have outpatient cardiologist contacted in order to give recommendations (Dr Leonard Trinh [phone])  · Continue Decadron 2 Q 6  · Medical management per primary team  · Mobilize as tolerated with assistance  · PT:  Recommend home PT pending stair trial, improved ambulation endurance, not ready for discharge  · OT:  Recommend home OT, will need bedside commode upon discharge  · DVT PPX:  SCDs and heparin  · Neurosurgery will continue to follow in the setting low back pain with bilateral leg radiculopathy  · Continue to medically optimize patient's pain status  · Per attending - concern is if patient discharged as early without definitive plan his lumbar canal stenosis may not be addressed and he may start to have bladder dysfunction  · Will discuss tentative surgical intervention on Monday with neurosurgical team to assess if patient would be a good candidate given extensive cardiac history  · Please call with questions or concerns    Subjective/Objective   Chief Complaint: "I am okay   / follow-up low back pain with bilateral leg radiculopathy    Subjective: At this time patient has 0/10 pain  Pain is brought on when standing and walking and he states pain level can reach a 9/10  Pain will start in patient's back and radiates down thighs and into posterior calf down to feet  He has chronic peripheral neuropathy in feet which she says is worsening and moving upward into calves  His legs feel weak but he states he is pushing himself in therapy to ambulate with a walker  States it is very painful to walk with therapy  No bowel or bladder issues  Denies headache, dizziness, chest pain, shortness of breath, abdominal pain, nausea or vomiting  Objective:  Sitting in chair, NAD    I/O       01/31 0701 - 02/01 0700 02/01 0701 - 02/02 0700 02/02 0701 - 02/03 0700    P  O  400 1200     Total Intake(mL/kg) 400 1200 (11 1)     Urine (mL/kg/hr) 1175 2600 (1)     Total Output 1175 2600      Net -775 -1400             Unmeasured Urine Occurrence 1 x            Invasive Devices     Peripheral Intravenous Line            Peripheral IV 02/01/19 Right Antecubital less than 1 day                Physical Exam:  Vitals: Blood pressure 141/89, pulse 64, temperature 98 8 °F (37 1 °C), temperature source Oral, resp   rate 18, height 6' 4" (1 93 m), weight 108 kg (238 lb 1 6 oz), SpO2 97 %  ,Body mass index is 28 98 kg/m²  General appearance: alert, appears stated age, cooperative and no distress  Head: Normocephalic, without obvious abnormality, atraumatic  Eyes: EOMI, PERRL  Neck: supple, symmetrical, trachea midline and NT  Back: no kyphosis present, no tenderness to percussion or palpation  Lungs: non labored breathing  Heart: regular heart rate  Neurologic:   Mental status: Alert, oriented x3, follows commands, thought content appropriate  Cranial nerves: grossly intact (Cranial nerves II-XII)  Sensory:  Slight decreased to light touch distal bilateral lower extremities, numbness to pinprick in bilateral feet which patient states is chronic but worsening, JPS 3/3 at right ankle and left toe, DST intact  Motor: moving all extremities with some weakness noted L > R, strength 5/5 except as noted:   RLE:  4/5 HF, 4+/5 DF   LLE:  3-3+/5 HF, 3-3+/5 DF  Reflexes: 2+ and symmetric, no Felipe or clonus noted  Coordination: finger to nose normal bilaterally, no drift bilaterally    Lab Results:    Results from last 7 days  Lab Units 02/02/19  0509 02/01/19  0503   WBC Thousand/uL 8 25 9 14  9 14   HEMOGLOBIN g/dL 12 7 11 9*  11 9*   HEMATOCRIT % 36 3* 34 3*  34 3*   PLATELETS Thousands/uL 165 160  160   NEUTROS PCT % 77* 70   MONOS PCT % 5 7       Results from last 7 days  Lab Units 02/02/19  0509 02/01/19  2124 02/01/19  0503   POTASSIUM mmol/L 4 4 4 4 4 1  4 1   CHLORIDE mmol/L 101 98* 102  103   CO2 mmol/L 26 24 26  26   BUN mg/dL 32* 38* 30*  33*   CREATININE mg/dL 1 53* 1 84* 1 62*  1 59*   CALCIUM mg/dL 8 9 8 6 8 7  8 7   ALK PHOS U/L 89  --  81  83   ALT U/L 113*  --  86*  85*   AST U/L 47*  --  42  44       Results from last 7 days  Lab Units 02/01/19  0503   MAGNESIUM mg/dL 2 1       Results from last 7 days  Lab Units 02/01/19  0503   PHOSPHORUS mg/dL 3 1       Imaging Studies: I have personally reviewed pertinent reports     and I have personally reviewed pertinent films in PACS    Mri Lumbar Spine Wo Contrast    Result Date: 1/31/2019  Impression: 1  Severe chronic disc and facet degenerative change throughout the lumbar spine, most prominent at L4-5 resulting in significant encroachment of the exiting L4 nerves and traversing L5 nerve roots  2   No evidence of acute disc herniation, epidural collection or hematoma  Workstation performed: [id number]     EKG, Pathology, and Other Studies: I have personally reviewed pertinent reports        VTE Pharmacologic Prophylaxis: Heparin    VTE Mechanical Prophylaxis: sequential compression device

## 2019-02-02 NOTE — ASSESSMENT & PLAN NOTE
· Patient reports worsening back pain since August of this past year  Patient noted that after his recent knee left-sided knee surgery he was having continued symptoms of back pain  He subsequently was sent for an epidural injection back in December with minimal improvement of symptoms  In follow-up he had a repeat epidural injection this past Tuesday  · Patient reports that the back pain has continued  He also notes that he was unable to get up early morning of admission  · Upon arrival to the hospital he was able to stand under his own volition however is extremely uncomfortable and requires assistance for stability  Today some improvement with PT walked in hallway however pt reports extremely uncomfortable /painful   · Patient reports he last voided this morning  Will obtain a MRI of the back  · Follow up on bladder scan with serial bladder scans and neuro checks  · Appreciate Neurosurgery, discussed with PA this am however was pending attending note just placed for final plan   · MRi lumbar spine: 1   Severe chronic disc and facet degenerative change throughout the lumbar spine, most prominent at L4-5 resulting in significant encroachment of the exiting L4 nerves and traversing L5 nerve roots  2   No evidence of acute disc herniation, epidural collection or hematoma  · Will discuss plan of care tomorrow with neurosx attending just placed recs and considering surgery need cardiac clearance   Hold AC or proph at this time   · Will consult cards for clearance   · Start at mn 2mg dexamethasone q6 hrs (will need to trend bs may need to consider insulin drip to catch up on bs) especially since only received 20 units last night

## 2019-02-02 NOTE — CONSULTS
Consultation - Cardiology   Pawan Mallory 74 y o  male MRN: [id number]  Unit/Bed#: [facility] 396-39 Encounter: [id number]    Assessment/Plan     Assessment/plan:    1  Spinal stenosis with left lower extremity pain/weakness and low back pain  2  Coronary artery disease S/P PCI reported by patient in 2007 at [facility]  3  Diastolic dysfunction  4  Hypertension  5  Hyperlipidemia  6  Insulin-dependent diabetes mellitus  7  Chronic kidney disease    -would recommend checking EKG  -transthoracic echocardiogram pending  -most recent transthoracic echocardiogram from April of 2017 read as left ventricular systolic function appeared relatively normal with estimated LVEF 60%  -patient underwent NM nuclear treadmill stress test on 07/20/2018 read as normal study with no ischemia or infarct and normal sized LV with estimated LVEF 63%  -would recommend continuing home medications including his 81 mg aspirin, isosorbide mononitrate 30 mg daily, metoprolol succinate 100 mg daily  -patient's home furosemide held on admission in the setting of GARY on CKD  -according to the revised cardiac risk index patient is at least moderate risk for surgical procedure however if surgery was recommended for emergent procedure patient would be appropriate risk at that time  Patient understands risks and is agreeable to further evaluation however prior to any surgical procedure patient would like neuro surgery to discuss surgical risk and options with his outpatient cardiologist from [facility] Dr Sears Covert    -will continue monitor patient clinically at this time   -would also recommend monitoring patient's volume status very closely in the setting of having to hold his diuretic therapy for acute renal dysfunction on chronic kidney disease      History of Present Illness   Physician Requesting Consult: Lavelle Jackson,   Reason for Consult / Principal Problem:  Perioperative evaluation  HPI: aPwan Mallory is a 74y o  year old male past medical history significant for coronary artery disease status post PCI most recently reported by the patient in 2017, insulin-dependent type 2 diabetes mellitus, hypertension, diastolic dysfunction, hyperlipidemia, stable angina pectoris, who presented to [facility] for further care and evaluation after having significant episodes of left-sided lower extremity pain and weakness as well as back pain over approximately the past week worsening to the point where the patient could no longer stand or bear weight  He reports that since his left knee surgery in August of 2018 he has had significant limitations to his activities of daily living secondary to pain in the extremity as well as in his back  He notes that over the past week however this is significantly worsened to the point where he was no longer able to stand or bear weight which caused him to present to the hospital for further care and evaluation  While here he was evaluated by Neurosurgery and underwent an MRI of the lumbar spine which showed severe chronic disc and facet degenerative changes throughout the lumbar spine most prominent at L4-5 resulting in significant encroachment of the existing L4 nerves and traversing L5 nerve roots with no evidence of acute disc herniation, epidural collection, or hematoma  -we were asked by Neurosurgery to come and evaluate the patient prior to any potential operative procedures that may need to be undertaken for the patient's treatment   -the patient notes that at this time he denies any chest pain, shortness of breath, lightheadedness or dizziness  He does state that his physical activity is limited by his left lower extremity pain and weakness as well as low back pain  He denies any palpitations, lower extremity edema, orthopnea, or chest pain with exertion    He does state that he has not been able to be very active since his knee surgery in August   -patient follows very closely with the cardiologist at [facility]  -patient notes that he has undergone approximately 31 surgeries in his lifetime and only had an episode of nausea and vomiting 1 time which is the only adverse event from anesthesia that he has had  Consults    Review of Systems   Constitutional: Negative for chills and fever  HENT: Negative for trouble swallowing and voice change  Eyes: Negative for pain and redness  Respiratory: Negative for shortness of breath and wheezing  Cardiovascular: Negative for chest pain, palpitations and leg swelling  Gastrointestinal: Negative for abdominal pain, diarrhea, nausea and vomiting  Genitourinary: Negative for dysuria  Musculoskeletal: Positive for arthralgias and back pain  Skin: Negative for rash  Neurological: Positive for weakness  Negative for dizziness, syncope and headaches  Psychiatric/Behavioral: Negative for agitation  All other systems reviewed and are negative  Historical Information   Past Medical History:   Diagnosis Date    Anemia     Last assessed: 9/28/17    Arteriosclerotic cardiovascular disease     Last assessed: 9/28/17    [facility] ([facility] )     Cardiac disease     Diabetes mellitus ([facility] )     Hypertension     Hypotension     Last assessed: 2/24/15    Insomnia     Last assessed: 11/14/12    Other seasonal allergic rhinitis     Last assessed: 2/10/16    Transient cerebral ischemia      Past Surgical History:   Procedure Laterality Date    CARDIAC SURGERY      Cath stent placement  Last assessed: 3/9/17  Interventional Catheterization    CHOLECYSTECTOMY      CYSTOSCOPY      Diagnostic w/biopsy  [place]  Last assessed: 12/1/14    CYSTOURETHROSCOPY      w/cautery  [place]    GASTRIC BYPASS      For morbid obesity w/Shaji-en-Y   Resolved: 11/17/09    INCISION AND DRAINAGE OF WOUND Right 2/26/2017    Procedure: INCISION AND DRAINAGE (I&D) EXTREMITY WITH APPLICATION OF GRAFT JACKET;  Surgeon: Ophelia Mars DEJUAN;  Location: [facility];  Service:     INCISION AND DRAINAGE OF WOUND Right 4/25/2017    Procedure: INCISION AND DRAINAGE (I&D) EXTREMITY, APPLICATION OF GRAFT;  Surgeon: Jason Covington DPM;  Location: [facility];  Service:     JOINT REPLACEMENT      Knee  Last assessed: 1/28/11    MO CYSTOURETHROSCOPY,BIOPSY N/A 8/16/2016    Procedure: Flip Tsang;  Surgeon: Yola Melchor MD;  [facility];  Service: Urology    ROTATOR CUFF REPAIR      SMALL INTESTINE SURGERY      Surgery Shaji-en-Y    SPINAL FUSION      VAC DRESSING APPLICATION Right 1/52/4049    Procedure: APPLICATION VAC DRESSING;  Surgeon: Jason Covington DPM;  Location: [facility];  Service:      History   Alcohol Use No     History   Drug Use No     History   Smoking Status    Former Smoker   Smokeless Tobacco    Never Used     Family History:   Family History   Problem Relation Age of Onset    Diabetes Mother     Heart disease Mother     Other Mother         High blood pressure    Heart disease Father     Diabetes Sister     Other Sister         High blood pressure    Kidney disease Sister     Heart disease Brother     Other Brother         High blood pressure       Meds/Allergies   all current active meds have been reviewed  Allergies   Allergen Reactions    Atorvastatin Hives, Other (See Comments) and Itching     Pt  Reports "Hives"  Lipitor and Simvastatin    Simvastatin Rash     Other reaction(s): Edema, Other Reaction  "ALL STATINS"    Insulin Lispro Edema    Statins Itching    Other Other (See Comments), Itching and Rash     rash to electrodes  rash to electrodes and adhesives       Objective   Vitals: Blood pressure 141/89, pulse 64, temperature 98 8 °F (37 1 °C), temperature source Oral, resp  rate 18, height 6' 4" (1 93 m), weight 108 kg (238 lb 1 6 oz), SpO2 97 %    Orthostatic Blood Pressures      Most Recent Value   Blood Pressure  141/89 filed at 02/02/2019 1645   Patient Position - Orthostatic VS  Lying filed at 02/02/2019 0741            Intake/Output Summary (Last 24 hours) at 02/02/19 0919  Last data filed at 02/02/19 5568   Gross per 24 hour   Intake             1200 ml   Output             2350 ml   Net            -1150 ml       Invasive Devices     Peripheral Intravenous Line            Peripheral IV 02/01/19 Right Antecubital less than 1 day                Physical Exam   Constitutional: He appears well-developed and well-nourished  HENT:   Head: Normocephalic and atraumatic  Eyes: Conjunctivae are normal  No scleral icterus  Neck: No tracheal deviation present  Cardiovascular: Normal rate and regular rhythm  Pulmonary/Chest: Effort normal  No respiratory distress  He has no wheezes  Abdominal: Soft  Bowel sounds are normal  There is no tenderness  Musculoskeletal: He exhibits no edema  Neurological:   Awake, alert, able to answer questions appropriately   Skin: Skin is warm and dry  He is not diaphoretic  Psychiatric: He has a normal mood and affect  Lab Results: I have personally reviewed pertinent lab results  Imaging: I have personally reviewed pertinent reports      VTE Prophylaxis: Heparin    Code Status: Level 1 - Full Code  Advance Directive and Living Will:      Power of :    POLST:

## 2019-02-02 NOTE — PROGRESS NOTES
POCT blood glucose level increasing throughout today  HS level measured at 500+mg/dL  SLIM notified  STAT BMP ordered with resultant 598 mg/dL confirming POCT result  Lantus increased from 20 UNITS HS to 45 UNITS HS  POCT will be performed 0200 2/2/2019  Pt IV was placed and IV NS bolus initiated at 76 9 mL/hr  Pt is resting comfortably and fully alert and oriented

## 2019-02-02 NOTE — ASSESSMENT & PLAN NOTE
Patient with known history of bladder CA  Outpatient follow-up    Monitor renal function trending up slightly will give one liter ivf with hyperglycemia

## 2019-02-03 LAB
ANION GAP SERPL CALCULATED.3IONS-SCNC: 6 MMOL/L (ref 4–13)
BACTERIA UR QL AUTO: ABNORMAL /HPF
BILIRUB UR QL STRIP: NEGATIVE
BUN SERPL-MCNC: 35 MG/DL (ref 5–25)
CALCIUM SERPL-MCNC: 9.2 MG/DL (ref 8.3–10.1)
CHLORIDE SERPL-SCNC: 103 MMOL/L (ref 100–108)
CLARITY UR: CLEAR
CO2 SERPL-SCNC: 26 MMOL/L (ref 21–32)
COLOR UR: YELLOW
CREAT SERPL-MCNC: 1.52 MG/DL (ref 0.6–1.3)
GFR SERPL CREATININE-BSD FRML MDRD: 51 ML/MIN/1.73SQ M
GLUCOSE SERPL-MCNC: 158 MG/DL (ref 65–140)
GLUCOSE SERPL-MCNC: 159 MG/DL (ref 65–140)
GLUCOSE SERPL-MCNC: 178 MG/DL (ref 65–140)
GLUCOSE SERPL-MCNC: 180 MG/DL (ref 65–140)
GLUCOSE SERPL-MCNC: 183 MG/DL (ref 65–140)
GLUCOSE SERPL-MCNC: 196 MG/DL (ref 65–140)
GLUCOSE SERPL-MCNC: 249 MG/DL (ref 65–140)
GLUCOSE SERPL-MCNC: 268 MG/DL (ref 65–140)
GLUCOSE SERPL-MCNC: 273 MG/DL (ref 65–140)
GLUCOSE SERPL-MCNC: 295 MG/DL (ref 65–140)
GLUCOSE SERPL-MCNC: 296 MG/DL (ref 65–140)
GLUCOSE SERPL-MCNC: 308 MG/DL (ref 65–140)
GLUCOSE SERPL-MCNC: 394 MG/DL (ref 65–140)
GLUCOSE UR STRIP-MCNC: ABNORMAL MG/DL
HGB UR QL STRIP.AUTO: ABNORMAL
HYALINE CASTS #/AREA URNS LPF: ABNORMAL /LPF
INR PPP: 0.98 (ref 0.86–1.17)
KETONES UR STRIP-MCNC: NEGATIVE MG/DL
LEUKOCYTE ESTERASE UR QL STRIP: ABNORMAL
NITRITE UR QL STRIP: NEGATIVE
NON-SQ EPI CELLS URNS QL MICRO: ABNORMAL /HPF
PH UR STRIP.AUTO: 5.5 [PH] (ref 4.5–8)
POTASSIUM SERPL-SCNC: 4.1 MMOL/L (ref 3.5–5.3)
PROT UR STRIP-MCNC: ABNORMAL MG/DL
PROTHROMBIN TIME: 13.1 SECONDS (ref 11.8–14.2)
RBC #/AREA URNS AUTO: ABNORMAL /HPF
SODIUM SERPL-SCNC: 135 MMOL/L (ref 136–145)
SP GR UR STRIP.AUTO: 1.02 (ref 1–1.03)
UROBILINOGEN UR QL STRIP.AUTO: 1 E.U./DL
WBC #/AREA URNS AUTO: ABNORMAL /HPF

## 2019-02-03 PROCEDURE — 97530 THERAPEUTIC ACTIVITIES: CPT

## 2019-02-03 PROCEDURE — 85610 PROTHROMBIN TIME: CPT | Performed by: PHYSICIAN ASSISTANT

## 2019-02-03 PROCEDURE — 99232 SBSQ HOSP IP/OBS MODERATE 35: CPT | Performed by: NURSE PRACTITIONER

## 2019-02-03 PROCEDURE — 81001 URINALYSIS AUTO W/SCOPE: CPT | Performed by: PHYSICIAN ASSISTANT

## 2019-02-03 PROCEDURE — 80048 BASIC METABOLIC PNL TOTAL CA: CPT | Performed by: NURSE PRACTITIONER

## 2019-02-03 PROCEDURE — 99223 1ST HOSP IP/OBS HIGH 75: CPT | Performed by: INTERNAL MEDICINE

## 2019-02-03 PROCEDURE — 99232 SBSQ HOSP IP/OBS MODERATE 35: CPT | Performed by: PHYSICIAN ASSISTANT

## 2019-02-03 PROCEDURE — 82948 REAGENT STRIP/BLOOD GLUCOSE: CPT

## 2019-02-03 RX ADMIN — MIRTAZAPINE 15 MG: 15 TABLET, FILM COATED ORAL at 21:09

## 2019-02-03 RX ADMIN — PANTOPRAZOLE SODIUM 40 MG: 40 TABLET, DELAYED RELEASE ORAL at 05:29

## 2019-02-03 RX ADMIN — HEPARIN SODIUM 5000 UNITS: 5000 INJECTION INTRAVENOUS; SUBCUTANEOUS at 21:09

## 2019-02-03 RX ADMIN — DEXAMETHASONE 2 MG: 2 TABLET ORAL at 05:29

## 2019-02-03 RX ADMIN — METOPROLOL SUCCINATE 100 MG: 100 TABLET, FILM COATED, EXTENDED RELEASE ORAL at 08:45

## 2019-02-03 RX ADMIN — DEXAMETHASONE 2 MG: 2 TABLET ORAL at 17:21

## 2019-02-03 RX ADMIN — HEPARIN SODIUM 5000 UNITS: 5000 INJECTION INTRAVENOUS; SUBCUTANEOUS at 05:29

## 2019-02-03 RX ADMIN — SODIUM CHLORIDE 13 UNITS/HR: 9 INJECTION, SOLUTION INTRAVENOUS at 22:29

## 2019-02-03 RX ADMIN — DEXAMETHASONE 2 MG: 2 TABLET ORAL at 11:05

## 2019-02-03 RX ADMIN — HEPARIN SODIUM 5000 UNITS: 5000 INJECTION INTRAVENOUS; SUBCUTANEOUS at 13:00

## 2019-02-03 RX ADMIN — GABAPENTIN 300 MG: 300 CAPSULE ORAL at 17:21

## 2019-02-03 RX ADMIN — GABAPENTIN 300 MG: 300 CAPSULE ORAL at 08:45

## 2019-02-03 RX ADMIN — INSULIN ASPART 10 UNITS: 100 INJECTION, SOLUTION INTRAVENOUS; SUBCUTANEOUS at 21:10

## 2019-02-03 RX ADMIN — BIMATOPROST 1 DROP: 0.1 SOLUTION/ DROPS OPHTHALMIC at 21:09

## 2019-02-03 RX ADMIN — ISOSORBIDE MONONITRATE 30 MG: 30 TABLET, EXTENDED RELEASE ORAL at 08:44

## 2019-02-03 RX ADMIN — SODIUM CHLORIDE 10 UNITS/HR: 9 INJECTION, SOLUTION INTRAVENOUS at 03:15

## 2019-02-03 NOTE — ASSESSMENT & PLAN NOTE
Continue with metoprolol  Imdur, will have cards input on whether to hold pending final plan per neurosx   Held lasix with mildly elevated creatinine 1 84 received one liter with improvement to 1 53  Per cards pt intermediate risk  For surgery should continue bb if plan of surgery

## 2019-02-03 NOTE — PROGRESS NOTES
Progress Note - Oneil Habermann 1945, 74 y o  male MRN: [id number]    Unit/Bed#: [facility] 269-46 Encounter: [id number]    Primary Care Provider: Angela Doherty MD   Date and time admitted to hospital: 1/31/2019  5:33 PM        * Back pain   Assessment & Plan    · Patient reports worsening back pain since August of this past year  Patient noted that after his recent knee left-sided knee surgery he was having continued symptoms of back pain  He subsequently was sent for an epidural injection back in December with minimal improvement of symptoms  In follow-up he had a repeat epidural injection this past Tuesday  · Patient reports that the back pain has continued  He also notes that he was unable to get up early morning of admission  · Upon arrival to the hospital he was able to stand under his own volition however is extremely uncomfortable and requires assistance for stability  Today some improvement with PT walked in hallway however pt reports extremely uncomfortable /painful   · No PVR   · Appreciate Neurosurgery, discussed with PA this am in regard to note yesterday as attending suggestive of possible need to do surgery  · MRi lumbar spine: 1   Severe chronic disc and facet degenerative change throughout the lumbar spine, most prominent at L4-5 resulting in significant encroachment of the exiting L4 nerves and traversing L5 nerve roots  2   No evidence of acute disc herniation, epidural collection or hematoma  ·  discussed w/ neurosx attending  placed recs and considering surgery need cardiac clearance     · Discussed with neurosx ok to start heparin sq   · Appreciated cards for possible clearance, pt requesting cards touch base with pts cardiologist which would be done on monday  · Started at [facility] 2/2/19  2mg dexamethasone q6 hrs pts insulin was increased but due to steroid still with elevated bs started on insulin drip now for endocrine to see pt today (unsure final plan pt may need surgery vs steroid taper although pt feels steroids are not helping)     Diabetes type 2, uncontrolled [facility])   Assessment & Plan    Lab Results   Component Value Date    HGBA1C 8 8 (H) 02/01/2019       Recent Labs      02/03/19   0105  02/03/19   0316  02/03/19   0511  02/03/19   0704   POCGLU  296*  183*  159*  178*       Blood Sugar Average: Last 72 hrs:  (P) 071 [id number]   Elevated pending neurosx final plan   Pt did not receive full lantus dosing  Night of admision and not on diabetic diet  Changed to ada diet on 2/2/18 was on regular diet   lantus dc last night started on insulin drip with plan still a little unclear but pending endo consult   However, with plan to start 2mg dexamethasone at [place] will need tight monitoring        Essential hypertension   Assessment & Plan    Metoprolol     Chronic obstructive pulmonary disease ([facility] [address] )   Assessment & Plan    Appears to be chronic  No acute exacerbation  CAD (coronary artery disease)   Assessment & Plan    Continue with metoprolol  Imdur, will have cards input on whether to hold pending final plan per neurosx   Held lasix with mildly elevated creatinine 1 84 received one liter with improvement to 1 52 today now off ivf will need strict monitoring this is about his baseline   Per cards pt intermediate risk  For surgery should continue bb if plan of surgery       Carcinoma in situ of bladder   Assessment & Plan    Patient with known history of bladder CA  Outpatient follow-up  Monitor renal function trending up slightly will give one liter ivf with hyperglycemia            VTE Pharmacologic Prophylaxis:   Pharmacologic: Heparin will be stopped midnight tonight in preparation for possible surgery  Mechanical VTE Prophylaxis in Place: Yes    Patient Centered Rounds: I have performed bedside rounds with nursing staff today      Discussions with Specialists or Other Care Team Provider:  Nursing    Education and Discussions with Family / Patient:  Patient    Time Spent for Care: 39 minutes  More than 50% of total time spent on counseling and coordination of care as described above  Current Length of Stay: 3 day(s)    Current Patient Status: Inpatient   Certification Statement: The patient will continue to require additional inpatient hospital stay due to Pending final Neurosurgery plan, on IV insulin drip    Discharge Plan:  Pending final Neurosurgery plan to be coordinated Monday    Code Status: Level 1 - Full Code      Subjective:   Patient reports ongoing weakness and pain down lumbar spine and left leg  Reports that his lower extremity feels really tight that his son yesterday mass charged it and he had a little bit of improvement with mobility  Patient reports that he has great difficulty with initial are the patient in standing  Reports that he has a problem on the commode nursing staff put high-rise of them and that has helped  Objective:     Vitals:   Temp (24hrs), Av 2 °F (36 8 °C), Min:98 2 °F (36 8 °C), Max:98 2 °F (36 8 °C)    Temp:  [98 2 °F (36 8 °C)] 98 2 °F (36 8 °C)  HR:  [55] 55  Resp:  [18] 18  BP: (135)/(86) 135/86  SpO2:  [100 %] 100 %  Body mass index is 28 98 kg/m²  Input and Output Summary (last 24 hours): Intake/Output Summary (Last 24 hours) at 19 0850  Last data filed at 19 0701   Gross per 24 hour   Intake          1839 34 ml   Output             1625 ml   Net           214 34 ml       Physical Exam:     Physical Exam   Constitutional: He is oriented to person, place, and time  He appears well-developed and well-nourished  No distress  HENT:   Head: Normocephalic and atraumatic  Mouth/Throat: No oropharyngeal exudate  Eyes: Conjunctivae are normal  Right eye exhibits no discharge  Left eye exhibits no discharge  No scleral icterus  Neck: No JVD present  No tracheal deviation present  No thyromegaly present  Cardiovascular: Normal rate and regular rhythm  Exam reveals no gallop and no friction rub      Murmur heard   Pulmonary/Chest: Effort normal and breath sounds normal  No stridor  Abdominal: He exhibits no distension and no mass  There is no tenderness  There is no rebound and no guarding  Musculoskeletal: He exhibits deformity (lle weakness)  He exhibits no edema or tenderness  Lymphadenopathy:     He has no cervical adenopathy  Neurological: He is oriented to person, place, and time  Skin: No rash noted  He is not diaphoretic  No erythema  No pallor  Psychiatric: He has a normal mood and affect  Additional Data:     Labs:      Results from last 7 days  Lab Units 02/02/19  0509   WBC Thousand/uL 8 25   HEMOGLOBIN g/dL 12 7   HEMATOCRIT % 36 3*   PLATELETS Thousands/uL 165   NEUTROS PCT % 77*   LYMPHS PCT % 17   MONOS PCT % 5   EOS PCT % 0       Results from last 7 days  Lab Units 02/03/19  0515 02/02/19  0509   SODIUM mmol/L 135* 134*   POTASSIUM mmol/L 4 1 4 4   CHLORIDE mmol/L 103 101   CO2 mmol/L 26 26   BUN mg/dL 35* 32*   CREATININE mg/dL 1 52* 1 53*   ANION GAP mmol/L 6 7   CALCIUM mg/dL 9 2 8 9   ALBUMIN g/dL  --  3 5   TOTAL BILIRUBIN mg/dL  --  0 49   ALK PHOS U/L  --  89   ALT U/L  --  113*   AST U/L  --  47*   GLUCOSE RANDOM mg/dL 158* 318*           Results from last 7 days  Lab Units 02/03/19  0704 02/03/19  0511 02/03/19  0316 02/03/19  0105 02/02/19  2302 02/02/19  2103 02/02/19  1858 02/02/19  1708 02/02/19  1706 02/02/19  1200 02/02/19  0726 02/02/19  0341   POC GLUCOSE mg/dl 178* 159* 183* 296* 322* 441* >500* >500* >500* 346* 280* 341*       Results from last 7 days  Lab Units 02/01/19  2124   HEMOGLOBIN A1C % 8 8*               * I Have Reviewed All Lab Data Listed Above  * Additional Pertinent Lab Tests Reviewed:  All Labs Within Last 24 Hours Reviewed    Imaging:    Imaging Reports Reviewed Today Include: review    Recent Cultures (last 7 days):           Last 24 Hours Medication List:     Current Facility-Administered Medications:  azelastine 1 spray Each Nare BID Hetul Cody, DO    bimatoprost 1 drop Both Eyes HS Hetul Cody, DO    dexamethasone 2 mg Oral Q6H [address] EVANGELINA Weiner    fluocinonide  Topical BID Hetul Cody, DO    fluticasone 1 spray Nasal Daily Hetul Cody, DO    gabapentin 300 mg Oral BID Hetul Cody, DO    heparin (porcine) 5,000 Units Subcutaneous Q8H [address] EVANGELINA Richards    insulin regular (HumuLIN R,NovoLIN R) infusion 0 3-21 Units/hr Intravenous Titrated EVANGELINA Elena Last Rate: 3 Units/hr (02/03/19 0701)   isosorbide mononitrate 30 mg Oral Daily Hetul Cody, DO    metoprolol succinate 100 mg Oral Daily Hetul Cody, DO    mirtazapine 15 mg Oral HS Hetul Cody, DO    morphine injection 2 mg Intravenous Q4H PRN Hetul Cody, DO    mupirocin  Nasal Q12H [address] Hetul Cody, DO    pantoprazole 40 mg Oral Early Morning Hetul Cody, DO         Today, Patient Was Seen By: EVANGELINA Weiner    ** Please Note: Dictation voice to text software may have been used in the creation of this document   **

## 2019-02-03 NOTE — ASSESSMENT & PLAN NOTE
Lab Results   Component Value Date    HGBA1C 8 8 (H) 02/01/2019       Recent Labs      02/02/19   1200  02/02/19   1706  02/02/19   1708  02/02/19   1858   POCGLU  346*  >500*  >500*  >500*       Blood Sugar Average: Last 72 hrs:  (P) 230 [id number]   Elevated pending neurosx final plan   Pt did not receive full lantus dosing last night and not on diabetic diet  Changed to ada diet  Last night   Increased lantus to to 55 units from 45 units   However, with plan to start 2mg dexamethasone at mn will need tight monitoring   May need to consider insulin drip

## 2019-02-03 NOTE — ASSESSMENT & PLAN NOTE
Lab Results   Component Value Date    HGBA1C 8 8 (H) 02/01/2019       Recent Labs      02/03/19   0105  02/03/19   0316  02/03/19   0511  02/03/19   0704   POCGLU  296*  183*  159*  178*       Blood Sugar Average: Last 72 hrs:  (P) 644 [id number]   Elevated pending neurosx final plan   Pt did not receive full lantus dosing  Night of admision and not on diabetic diet  Changed to ada diet on 2/2/18 was on regular diet   lantus dc last night started on insulin drip with plan still a little unclear but pending endo consult   However, with plan to start 2mg dexamethasone at mn will need tight monitoring

## 2019-02-03 NOTE — PHYSICAL THERAPY NOTE
Physical Therapy Progress Note     02/03/19 1057   Pain Assessment   Pain Assessment 0-10   Restrictions/Precautions   Other Precautions Pain; Fall Risk   Subjective   Subjective Pt agitated when encountered  "They said they wanted to do surgery, but they don't even know what's wrong with me "  Refused all mobility tasks this session  Balance   Static Sitting Fair +   Assessment   Prognosis Good   Problem List Decreased strength; Impaired balance;Decreased endurance;Decreased mobility;Pain; Impaired judgement   Assessment Pt encountered seated in chair, refusing to participate in ambulatory tasks this session  Pt did not appear to be in pain or distress, but reports that when he walked yesterday with son, he was limited by pain  Reports slight improvement with LSO brace  Extensive education given about benefits of mobiilty  Pt also reported he does not have to ambulate on steps if he goes into house through garage since he has a chair lift to 2nd floor if he needs it  Will continue to benefit from therapy services to promote increased mobility, and participation in ambulatory tasks to maximize activity tolerance, strength, and safety for safe return home  Barriers to Discharge Inaccessible home environment   Barriers to Discharge Comments ADILENE   Goals   Patient Goals to leave the hospital if he doesn't get the answers he wants   STG Expiration Date 02/11/19   Short Term Goal #1 In 10 sessions pt will: 1  Transfer supine <> sit independently  2  Transfer sit <> stand with modified independence and LRAD  3  Ambulate >100 ft with modified independence and LRAD  4  Navigate 8 steps with supervision  5 as per PT assessment;   Perform LE ther-ex program    Treatment Day 1   Plan   Treatment/Interventions Functional transfer training;LE strengthening/ROM; Elevations; Therapeutic exercise; Endurance training;Patient/family training;Equipment eval/education; Bed mobility;Gait training   Progress Slow progress, decreased activity tolerance   PT Frequency (3-5x/week)   Recommendation   Recommendation Home PT  (pending clarification on ADILENE home, increased ambulation)   Equipment Recommended (pt has RW, SPC)   PT - OK to Discharge Cathryn Charlton, MICHAEL

## 2019-02-03 NOTE — ASSESSMENT & PLAN NOTE
Continue with metoprolol  Imdur, will have cards input on whether to hold pending final plan per neurosx   Held lasix with mildly elevated creatinine 1 84 received one liter with improvement to 1 52 today now off ivf will need strict monitoring this is about his baseline   Per cards pt intermediate risk  For surgery should continue bb if plan of surgery

## 2019-02-03 NOTE — CONSULTS
Consultation - Roxi Ramos 74 y o  male MRN: [id number]    Unit/Bed#: [facility] 151-40 Encounter: [id number]      Assessment/Plan     Assessment: This is a 74y o -year-old male with type 2 diabetes with hyperglycemia on long-term insulin therapy, steroid induced hyperglycemia, spinal stenosis    Plan: Will continue IV insulin infusion for today-fingersticks to be monitored q 2 hours on IV insulin infusion  Will add Humalog 10 units before meals  He will be off steroids by tomorrow  At that time his insulin requirements are going to be decreased and he can be switched over to basal bolus insulin therapy  It is also not very clear if he will be going for any surgical intervention tomorrow  So for now IV insulin infusion is the optimal choice  Spinal stenosis-neuro surgery is on board, surgical intervention is being planned  CC: Diabetes Consult    History of Present Illness     HPI: Roxi Ramos is a 74y o  year old male with type 2 DIABETES LONG-TERM INSULIN THERAPY-who was admitted for worsening low back-imaging showed severe spinal stenosis-he was seen in consultation by Neurosurgery and started on IV steroids with possible surgical intervention  After starting steroids fingersticks for more than 500s-he was started on IV insulin infusion and endocrine consult is requested for management of diabetes  Patient states that he has had type 2 diabetes for more than 20 years-treated with basal bolus insulin at home-complicated by neuropathy  At home he takes Lantus 45 units at bedtime and NovoLog 15 units before meals  He states that he checks his blood sugars 3 to 4 times a day-fasting sugars are usually less than 120 and rest of the day between 100 to 200s  He denies any recent hypoglycemic episodes  Does complain of occasional polyuria, polydipsia and blurry vision  Complains of numbness and tingling in his feet    Currently on IV insulin infusion his fingersticks since morning have ranged between 150s to 260s  He has good appetite and denies any nausea or vomiting    Consults    Review of Systems   Constitutional: Positive for fatigue  Negative for unexpected weight change  Eyes: Negative for visual disturbance  Respiratory: Negative for shortness of breath  Cardiovascular: Negative for palpitations and leg swelling  Gastrointestinal: Negative for constipation, diarrhea, nausea and vomiting  Endocrine: Positive for polydipsia and polyuria  Musculoskeletal: Positive for back pain and gait problem  Skin: Negative for color change, pallor and rash  Neurological: Positive for weakness and numbness  All other systems reviewed and are negative  Historical Information   Past Medical History:   Diagnosis Date    Anemia     Last assessed: 9/28/17    Arteriosclerotic cardiovascular disease     Last assessed: 9/28/17    [facility] ([email or web address]     Cardiac disease     Diabetes mellitus ([email or web address] )     Hypertension     Hypotension     Last assessed: 2/24/15    Insomnia     Last assessed: 11/14/12    Other seasonal allergic rhinitis     Last assessed: 2/10/16    Transient cerebral ischemia      Past Surgical History:   Procedure Laterality Date    CARDIAC SURGERY      Cath stent placement  Last assessed: 3/9/17  Interventional Catheterization    CHOLECYSTECTOMY      CYSTOSCOPY      Diagnostic w/biopsy  [facility]  Last assessed: 12/1/14    CYSTOURETHROSCOPY      w/cautery  [facility]    GASTRIC BYPASS      For morbid obesity w/Shaji-en-Y   Resolved: 11/17/09    INCISION AND DRAINAGE OF WOUND Right 2/26/2017    Procedure: INCISION AND DRAINAGE (I&D) EXTREMITY WITH APPLICATION OF GRAFT JACKET;  Surgeon: George Claros DPM;  Location: [facility];  Service:     INCISION AND DRAINAGE OF WOUND Right 4/25/2017    Procedure: INCISION AND DRAINAGE (I&D) EXTREMITY, APPLICATION OF GRAFT;  Surgeon: George Claros DPM;  Location: [facility];  Service:     JOINT REPLACEMENT Knee  Last assessed: 1/28/11    OR CYSTOURETHROSCOPY,BIOPSY N/A 8/16/2016    Procedure: CYSTOSCOPY WITH BIOPSIES;  Surgeon: Jacky Henning MD;  [facility]: [facility];  Service: Urology    ROTATOR CUFF REPAIR      SMALL INTESTINE SURGERY      Surgery Shaji-en-Y    SPINAL FUSION      VAC DRESSING APPLICATION Right 4/48/5593    Procedure: APPLICATION VAC DRESSING;  Surgeon: Osman Byrne DPM;  Location: [facility];  Service:      Social History   History   Alcohol Use No     History   Drug Use No     History   Smoking Status    Former Smoker   Smokeless Tobacco    Never Used     Family History:   Family History   Problem Relation Age of Onset    Diabetes Mother     Heart disease Mother     Other Mother         High blood pressure    Heart disease Father     Diabetes Sister     Other Sister         High blood pressure    Kidney disease Sister     Heart disease Brother     Other Brother         High blood pressure       Meds/Allergies   Current Facility-Administered Medications   Medication Dose Route Frequency Provider Last Rate Last Dose    azelastine (ASTELIN) 0 1 % nasal spray 1 spray  1 spray Each Nare BID Hetul Cody, DO        bimatoprost (LUMIGAN) 0 01 % ophthalmic solution 1 drop  1 drop Both Eyes HS Hetul Cody, DO   1 drop at 02/02/19 2105    dexamethasone (DECADRON) tablet 2 mg  2 mg Oral Q6H [address] EVANGELINA Richards   2 mg at 02/03/19 1105    fluocinonide (LIDEX) 0 05 % cream   Topical BID Hetul Cody, DO        fluticasone (FLONASE) 50 mcg/act nasal spray 1 spray  1 spray Nasal Daily Hetul Cody, DO        gabapentin (NEURONTIN) capsule 300 mg  300 mg Oral BID Hetul Cody, DO   300 mg at 02/03/19 0845    heparin (porcine) subcutaneous injection 5,000 Units  5,000 Units Subcutaneous Q8H [address] Georgette Olivera PA-C   5,000 Units at 02/03/19 1300    insulin regular (HumuLIN R,NovoLIN R) 1 Units/mL in sodium chloride 0 9 % 100 mL infusion  0 3-21 Units/hr Intravenous Titrated Chuckie Field, CRNP 4 mL/hr at 02/03/19 1300 4 Units/hr at 02/03/19 1300    isosorbide mononitrate (IMDUR) 24 hr tablet 30 mg  30 mg Oral Daily Hetul Cody, DO   30 mg at 02/03/19 0844    metoprolol succinate (TOPROL-XL) 24 hr tablet 100 mg  100 mg Oral Daily Hetul Cody, DO   100 mg at 02/03/19 0845    mirtazapine (REMERON) tablet 15 mg  15 mg Oral HS Hetul Cody, DO   15 mg at 02/02/19 2106    morphine injection 2 mg  2 mg Intravenous Q4H PRN Hetul Cody, DO        mupirocin (BACTROBAN) 2 % nasal ointment   Nasal Q12H [facility] Hetul Cody, DO   1 application at 21/24/62 2209    pantoprazole (PROTONIX) EC tablet 40 mg  40 mg Oral Early Morning Hetul Cody, DO   40 mg at 02/03/19 6464     Allergies   Allergen Reactions    Atorvastatin Hives, Other (See Comments) and Itching     Pt  Reports "Hives"  Lipitor and Simvastatin    Simvastatin Rash     Other reaction(s): Edema, Other Reaction  "ALL STATINS"    Insulin Lispro Edema    Statins Itching    Other Other (See Comments), Itching and Rash     rash to electrodes  rash to electrodes and adhesives       Objective   Vitals: Blood pressure 135/86, pulse 55, temperature 98 2 °F (36 8 °C), temperature source Oral, resp  rate 18, height 6' 4" (1 93 m), weight 108 kg (238 lb 1 6 oz), SpO2 100 %  Intake/Output Summary (Last 24 hours) at 02/03/19 1321  Last data filed at 02/03/19 1231   Gross per 24 hour   Intake          1219 34 ml   Output             2050 ml   Net          -830 66 ml     Invasive Devices     Peripheral Intravenous Line            Peripheral IV 02/01/19 Right Antecubital 1 day                Physical Exam   Constitutional: He is oriented to person, place, and time  He appears well-developed and well-nourished  No distress  HENT:   Head: Normocephalic and atraumatic  Mouth/Throat: No oropharyngeal exudate  Eyes: Conjunctivae and EOM are normal  No scleral icterus  Neck: Normal range of motion  Neck supple  No thyromegaly present  Cardiovascular: Normal rate, regular rhythm and normal heart sounds  No murmur heard  Pulmonary/Chest: Effort normal and breath sounds normal  No respiratory distress  He has no wheezes  He has no rales  Abdominal: Soft  Bowel sounds are normal  He exhibits no distension  There is no tenderness  There is no rebound  Musculoskeletal: He exhibits no edema or deformity  Left lower extremity weakness   Lymphadenopathy:     He has no cervical adenopathy  Neurological: He is alert and oriented to person, place, and time  Skin: Skin is warm and dry  No rash noted  No erythema  No pallor  Psychiatric: He has a normal mood and affect  His behavior is normal  Judgment and thought content normal        The history was obtained from the review of the chart, patient  Lab Results:     Results from last 7 days  Lab Units 02/01/19  2124   HEMOGLOBIN A1C % 8 8*     Lab Results   Component Value Date    WBC 8 25 02/02/2019    HGB 12 7 02/02/2019    HCT 36 3 (L) 02/02/2019    MCV 92 02/02/2019     02/02/2019     Lab Results   Component Value Date/Time    BUN 35 (H) 02/03/2019 05:15 AM    BUN 19 09/03/2015 01:42 PM     09/03/2015 01:42 PM    K 4 1 02/03/2019 05:15 AM    K 4 5 09/03/2015 01:42 PM     02/03/2019 05:15 AM     09/03/2015 01:42 PM    CO2 26 02/03/2019 05:15 AM    CO2 28 6 09/03/2015 01:42 PM    CREATININE 1 52 (H) 02/03/2019 05:15 AM    CREATININE 1 37 (H) 09/03/2015 01:42 PM    AST 47 (H) 02/02/2019 05:09 AM    AST 56 (H) 07/26/2015 04:50 AM     (H) 02/02/2019 05:09 AM    ALT 84 (H) 07/26/2015 04:50 AM    ALB 3 5 02/02/2019 05:09 AM    ALB 3 1 (L) 07/26/2015 04:50 AM     No results for input(s): CHOL, HDL, LDL, TRIG, VLDL in the last 72 hours    No results found for: Naseem Muñoz  POC Glucose   Date Value   02/03/2019 196 mg/dl (H)   02/03/2019 268 mg/dl (H)   02/03/2019 249 mg/dl (H)   02/03/2019 178 mg/dl (H)   02/03/2019 159 mg/dl (H)   02/03/2019 183 mg/dl (H)   02/03/2019 296 mg/dl (H)   02/02/2019 322 mg/dl (H)   02/02/2019 441 mg/dl (H)   02/02/2019 >500 mg/dl (HH)   09/14/2016 179       Imaging Studies: I have personally reviewed pertinent reports  Study Result     MRI LUMBAR SPINE WITHOUT CONTRAST         IMPRESSION:        1  Severe chronic disc and facet degenerative change throughout the lumbar spine, most prominent at L4-5 resulting in significant encroachment of the exiting L4 nerves and traversing L5 nerve roots  2   No evidence of acute disc herniation, epidural collection or hematoma      Portions of the record may have been created with voice recognition software

## 2019-02-03 NOTE — ASSESSMENT & PLAN NOTE
· Patient reports worsening back pain since August of this past year  Patient noted that after his recent knee left-sided knee surgery he was having continued symptoms of back pain  He subsequently was sent for an epidural injection back in December with minimal improvement of symptoms  In follow-up he had a repeat epidural injection this past Tuesday  · Patient reports that the back pain has continued  He also notes that he was unable to get up early morning of admission  · Upon arrival to the hospital he was able to stand under his own volition however is extremely uncomfortable and requires assistance for stability  Today some improvement with PT walked in hallway however pt reports extremely uncomfortable /painful   · No PVR   · Appreciate Neurosurgery, discussed with PA this am in regard to note yesterday as attending suggestive of possible need to do surgery  · MRi lumbar spine: 1   Severe chronic disc and facet degenerative change throughout the lumbar spine, most prominent at L4-5 resulting in significant encroachment of the exiting L4 nerves and traversing L5 nerve roots  2   No evidence of acute disc herniation, epidural collection or hematoma  ·  discussed w/ neurosx attending  placed recs and considering surgery need cardiac clearance     · Discussed with neurosx ok to start heparin sq   · Appreciated cards for possible clearance, pt requesting cards touch base with pts cardiologist which would be done on monday  · Started at [facility] 2/2/19  2mg dexamethasone q6 hrs pts insulin was increased but due to steroid still with elevated bs started on insulin drip now for endocrine to see pt today (unsure final plan pt may need surgery vs steroid taper although pt feels steroids are not helping)

## 2019-02-03 NOTE — PROGRESS NOTES
Progress Note - Ed Mays 1945, 74 y o  male MRN: [id number]    Unit/Bed#: [facility] 654-79 Encounter: [id number]    Primary Care Provider: Pedro Kahn MD   Date and time admitted to hospital: 1/31/2019  5:33 PM        * Back pain   Assessment & Plan    · Patient reports worsening back pain since August of this past year  Patient noted that after his recent knee left-sided knee surgery he was having continued symptoms of back pain  He subsequently was sent for an epidural injection back in December with minimal improvement of symptoms  In follow-up he had a repeat epidural injection this past Tuesday  · Patient reports that the back pain has continued  He also notes that he was unable to get up early morning of admission  · Upon arrival to the hospital he was able to stand under his own volition however is extremely uncomfortable and requires assistance for stability  Today some improvement with PT walked in hallway however pt reports extremely uncomfortable /painful   · No PVR   · Appreciate Neurosurgery, discussed with PA this am in regard to note yesterday as attending suggestive of possible need to do surgery  · MRi lumbar spine: 1   Severe chronic disc and facet degenerative change throughout the lumbar spine, most prominent at L4-5 resulting in significant encroachment of the exiting L4 nerves and traversing L5 nerve roots  2   No evidence of acute disc herniation, epidural collection or hematoma  · Will discuss plan of care tomorrow with neurosx attending just placed recs and considering surgery need cardiac clearance     · Discussed with neurosx the needs for sq heparin if ok < will start now    · Appreciated cards for possible clearance, pt requesting cards touch base with pts cardiologist which would be done on monday  · Start at [place] 22/2/19  2mg dexamethasone q6 hrs (will need to trend bs may need to consider insulin drip to catch up on bs) especially since catching up on original low dose Diabetes type 2, uncontrolled ([facility] )   Assessment & Plan    Lab Results   Component Value Date    HGBA1C 8 8 (H) 02/01/2019       Recent Labs      02/02/19   1200  02/02/19   1706  02/02/19   1708  02/02/19   1858   POCGLU  346*  >500*  >500*  >500*       Blood Sugar Average: Last 72 hrs:  (P) 230 [id number]   Elevated pending neurosx final plan   Pt did not receive full lantus dosing last night and not on diabetic diet  Changed to ada diet  Last night   Increased lantus to to 55 units from 45 units   However, with plan to start 2mg dexamethasone at mn will need tight monitoring   May need to consider insulin drip      Essential hypertension   Assessment & Plan    Metoprolol     Chronic obstructive pulmonary disease ([place] [address] )   Assessment & Plan    Appears to be chronic  No acute exacerbation  CAD (coronary artery disease)   Assessment & Plan    Continue with metoprolol  Imdur, will have cards input on whether to hold pending final plan per neurosx   Held lasix with mildly elevated creatinine 1 84 received one liter with improvement to 1 53  Per cards pt intermediate risk  For surgery should continue bb if plan of surgery       Carcinoma in situ of bladder   Assessment & Plan    Patient with known history of bladder CA  Outpatient follow-up  Monitor renal function trending up slightly will give one liter ivf with hyperglycemia          VTE Pharmacologic Prophylaxis:   Pharmacologic: Heparin (discussed with ap ok ot start)  Mechanical VTE Prophylaxis in Place: Yes    Patient Centered Rounds: I have performed bedside rounds with nursing staff today  Discussions with Specialists or Other Care Team Provider: nursing     Education and Discussions with Family / Patient: patient     Time Spent for Care: 45 minutes  More than 50% of total time spent on counseling and coordination of care as described above      Current Length of Stay: 2 day(s)    Current Patient Status: Inpatient   Certification Statement: The patient will continue to require additional inpatient hospital stay due to ongoing plan for steroid tx or infection    Discharge Plan: pending final plan per neurosx     Code Status: Level 1 - Full Code      Subjective:   Spent significant discussion with pt in regard to suggestions per the neurosx suggestion  He is reluctant feels that he has had both parents "die on the table" pt states even though he has undergone so many surgeries he is reluctant to undergo any procedure  Discussed the use of insulin due to his elevated blood sugars     Objective:     Vitals:   Temp (24hrs), Av 8 °F (37 1 °C), Min:98 8 °F (37 1 °C), Max:98 8 °F (37 1 °C)    Temp:  [98 8 °F (37 1 °C)] 98 8 °F (37 1 °C)  HR:  [64] 64  Resp:  [18] 18  BP: (141)/(89) 141/89  SpO2:  [97 %] 97 %  Body mass index is 28 98 kg/m²  Input and Output Summary (last 24 hours): Intake/Output Summary (Last 24 hours) at 19  Last data filed at 19 1718   Gross per 24 hour   Intake             1140 ml   Output             1325 ml   Net             -185 ml       Physical Exam:     Physical Exam   Constitutional: He is oriented to person, place, and time  He appears well-developed and well-nourished  No distress  HENT:   Head: Normocephalic and atraumatic  Eyes: Conjunctivae are normal  Right eye exhibits no discharge  Left eye exhibits no discharge  No scleral icterus  Neck: No JVD present  No tracheal deviation present  No thyromegaly present  Cardiovascular: Normal rate  Exam reveals no gallop and no friction rub  No murmur heard  Pulmonary/Chest: Effort normal  No respiratory distress  He has no wheezes  He has no rales  He exhibits no tenderness  Abdominal: He exhibits no distension and no mass  There is no tenderness  There is no rebound and no guarding  Musculoskeletal: He exhibits no edema, tenderness or deformity  Lymphadenopathy:     He has no cervical adenopathy     Neurological: He is oriented to person, place, and time  Skin: No rash noted  He is not diaphoretic  No erythema  No pallor  Psychiatric: He has a normal mood and affect  Additional Data:     Labs:      Results from last 7 days  Lab Units 02/02/19  0509   WBC Thousand/uL 8 25   HEMOGLOBIN g/dL 12 7   HEMATOCRIT % 36 3*   PLATELETS Thousands/uL 165   NEUTROS PCT % 77*   LYMPHS PCT % 17   MONOS PCT % 5   EOS PCT % 0       Results from last 7 days  Lab Units 02/02/19  0509   SODIUM mmol/L 134*   POTASSIUM mmol/L 4 4   CHLORIDE mmol/L 101   CO2 mmol/L 26   BUN mg/dL 32*   CREATININE mg/dL 1 53*   ANION GAP mmol/L 7   CALCIUM mg/dL 8 9   ALBUMIN g/dL 3 5   TOTAL BILIRUBIN mg/dL 0 49   ALK PHOS U/L 89   ALT U/L 113*   AST U/L 47*   GLUCOSE RANDOM mg/dL 318*           Results from last 7 days  Lab Units 02/02/19  1858 02/02/19  1708 02/02/19  1706 02/02/19  1200 02/02/19  0726 02/02/19  0341 02/02/19  0200 02/01/19  2100 02/01/19  1710 02/01/19  1155 02/01/19  0742 01/31/19  2113   POC GLUCOSE mg/dl >500* >500* >500* 346* 280* 341* 366* >500* 424* 381* 264* 285*       Results from last 7 days  Lab Units 02/01/19  2124   HEMOGLOBIN A1C % 8 8*               * I Have Reviewed All Lab Data Listed Above  * Additional Pertinent Lab Tests Reviewed:  All Labs Within Last 24 Hours Reviewed    Imaging:    Imaging Reports Reviewed Today Include: reviewed     Recent Cultures (last 7 days):           Last 24 Hours Medication List:     Current Facility-Administered Medications:  azelastine 1 spray Each Nare BID Hetul Cody, DO    bimatoprost 1 drop Both Eyes HS Hetul Cody, DO    dexamethasone 2 mg Oral Q6H [address] EVANGELINA Richards    fluocinonide  Topical BID Hetul Cody, DO    fluticasone 1 spray Nasal Daily Hetul Cody, DO    gabapentin 300 mg Oral BID Hetul Cody, DO    heparin (porcine) 5,000 Units Subcutaneous [facility] EVANGELINA Richards    insulin regular (HumuLIN R,NovoLIN R) infusion 0 3-21 Units/hr Intravenous Titrated EVANGELINA Velarde Last Rate: 10 Units/hr (02/02/19 8595)   isosorbide mononitrate 30 mg Oral Daily Hetul Cody, DO    metoprolol succinate 100 mg Oral Daily Hetul Cody, DO    mirtazapine 15 mg Oral HS Hetul Cody, DO    morphine injection 2 mg Intravenous Q4H PRN Hetul Cody, DO    mupirocin  Nasal Q12H [facility] Hetul Cody, DO    pantoprazole 40 mg Oral Early Morning Hetul Cody, DO         Today, Patient Was Seen By: EVANGELINA Gill    ** Please Note: Dictation voice to text software may have been used in the creation of this document   **

## 2019-02-03 NOTE — PROGRESS NOTES
Progress Note - Neurosurgery   Ed Steele 74 y o  male MRN: [id number]  Unit/Bed#: [facility] 618-01 Encounter: [id number]    Assessment:  1  Low back pain with bilateral leg radiculopathy  2  Spinal stenosis  3  Neurogenic claudication  4  History of bladder cancer on chemotherapy  5  History of bilateral knee replacement  6  Status post triple cardiac stent placement  7  History of diabetes with peripheral neuropathy  8  Hypertension  9  Status post bilateral rotator cuff repair    Plan:  · Exam:  Alert and oriented x3, moving all extremities with some weakness noted in LLE > RLE, 3+/5 DF LLE, numbness to pinprick in bilateral feet, JPS 3/3 bilateral toes, DST intact, no midline or paraspinal tenderness  · Imaging reviewed personally and by attending  Results are as follows:  · MRI lumbar spine without contrast 01/31/2019:  Severe chronic disc and facet degenerative change throughout the lumbar spine, most prominent at L4-5 resulting in significant encroachment of the exiting L4 nerves and traversing L5 nerve roots  No evidence of acute disc herniation, epidural collection or hematoma    · Cardiology following, appreciate input  · Consult and for cardiac clearance for possible surgical intervention if warranted  · Recommend checking EKG  · Recommend continuing home medications including aspirin, metoprolol, and isosorbide mononitrate  · According to revised cardiac risk index patient is at least moderate risk for surgical procedure however if emergent surgery is required patient would be appropriate at that time  · Expresses desire to have outpatient cardiologist contacted in order to give recommendations (Dr Valentine Lott [phone])  · Continue Decadron 2 Q 6  · Medical management per primary team  · Blood glucose > 500 x2 last night - insulin gtt initiated, d/c SQ insulin, endocrine consult placed   · Steroids likely contributing  · Mobilize as tolerated with assistance  · PT:  Recommend home PT pending stair trial, improved ambulation endurance, not ready for discharge  · OT:  Recommend home OT, will need bedside commode upon discharge  · DVT PPX:  SCDs and heparin  · Will d/c heparin after midnight  · Neurosurgery will continue to follow in the setting low back pain with bilateral leg radiculopathy  · Continue to medically optimize patient's pain status  · Per attending - concern is if patient is discharged early without definitive plan his lumbar canal stenosis may not be addressed and he may start to have bladder dysfunction  · Will discuss tentative surgical intervention on Monday with neurosurgical team to assess if patient would be a good candidate given extensive cardiac history  · Will prep patient tonight preoperatively in the event he is found to be a surgical candidate  · NPO after midnight  · DC heparin after midnight  · Preop labs ordered  · Please call with questions or concerns    Subjective/Objective   Chief Complaint: "I am okay " / follow up low back pain with radiculopathy    Subjective:  Patient complains of left lower extremity muscle tightness  States is some was able to massage his leg a little bit yesterday which helped  He is not having any back or leg pain at this time as he is lying down  States pain is made worse when standing  Urinating and having bowel movements per baseline  Able to ambulate to the bathroom  Did not sleep well last night  Denies headache, dizziness, chest pain, shortness of breath, abdominal pain, nausea or vomiting, bowel or bladder issues, change in numbness/tingling/weakness  Objective:  Lying in bed, NAD    I/O       02/01 0701 - 02/02 0700 02/02 0701 - 02/03 0700 02/03 0701 - 02/04 0700    P  O  1200 1740     I V  (mL/kg)  93 3 (0 9) 6 (0 1)    Total Intake(mL/kg) 1200 (11 1) 1833 3 (17) 6 (0 1)    Urine (mL/kg/hr) 2600 (1) 1625 (0 6)     Total Output 2600 1625      Net -1400 +208 3 +6           Unmeasured Stool Occurrence  1 x           Invasive Devices Peripheral Intravenous Line            Peripheral IV 02/01/19 Right Antecubital 1 day                Physical Exam:  Vitals: Blood pressure 135/86, pulse 55, temperature 98 2 °F (36 8 °C), temperature source Oral, resp  rate 18, height 6' 4" (1 93 m), weight 108 kg (238 lb 1 6 oz), SpO2 100 %  ,Body mass index is 28 98 kg/m²      General appearance: alert, appears stated age, cooperative and no distress  Head: Normocephalic, without obvious abnormality, atraumatic  Eyes: EOMI, PERRL  Neck: supple, symmetrical, trachea midline and NT  Back: no kyphosis present, no tenderness to percussion or palpation  Lungs: non labored breathing  Heart: regular heart rate  Neurologic:   Mental status: Alert, oriented x 3, follows commands, thought content appropriate  Cranial nerves: grossly intact (Cranial nerves II-XII)  Sensory: normal to light touch, decreased sensation to pinprick in bilateral feet, JPS 3/3, DST intact  Motor: moving all extremities with some weakness noted in LLE > RLE, patient reports pain with leg raise in contralateral legs, pain limiting, strength 5/5 except as noted   RLE:  3+/5 HF, 4/5 HE   LLE:  2-3/5 HF, 3+/5 DF, 4/5 EHL  Reflexes: 2+ and symmetric, no nino or clonus noted  Coordination: finger to nose normal bilaterally, no drift bilaterally    Lab Results:    Results from last 7 days  Lab Units 02/02/19  0509 02/01/19  0503   WBC Thousand/uL 8 25 9 14  9 14   HEMOGLOBIN g/dL 12 7 11 9*  11 9*   HEMATOCRIT % 36 3* 34 3*  34 3*   PLATELETS Thousands/uL 165 160  160   NEUTROS PCT % 77* 70   MONOS PCT % 5 7       Results from last 7 days  Lab Units 02/03/19  0515 02/02/19  0509 02/01/19  2124 02/01/19  0503   POTASSIUM mmol/L 4 1 4 4 4 4 4 1  4 1   CHLORIDE mmol/L 103 101 98* 102  103   CO2 mmol/L 26 26 24 26  26   BUN mg/dL 35* 32* 38* 30*  33*   CREATININE mg/dL 1 52* 1 53* 1 84* 1 62*  1 59*   CALCIUM mg/dL 9 2 8 9 8 6 8 7  8 7   ALK PHOS U/L  --  89  --  81  83   ALT U/L  --  113*  -- 86*  85*   AST U/L  --  47*  --  42  44       Results from last 7 days  Lab Units 02/01/19  0503   MAGNESIUM mg/dL 2 1       Results from last 7 days  Lab Units 02/01/19  0503   PHOSPHORUS mg/dL 3 1       Imaging Studies: I have personally reviewed pertinent reports  and I have personally reviewed pertinent films in PACS    Mri Lumbar Spine Wo Contrast    Result Date: 1/31/2019  Impression: 1  Severe chronic disc and facet degenerative change throughout the lumbar spine, most prominent at L4-5 resulting in significant encroachment of the exiting L4 nerves and traversing L5 nerve roots  2   No evidence of acute disc herniation, epidural collection or hematoma  Workstation performed: [id number]     EKG, Pathology, and Other Studies: I have personally reviewed pertinent reports        VTE Pharmacologic Prophylaxis: Heparin    VTE Mechanical Prophylaxis: sequential compression device

## 2019-02-03 NOTE — ASSESSMENT & PLAN NOTE
· Patient reports worsening back pain since August of this past year  Patient noted that after his recent knee left-sided knee surgery he was having continued symptoms of back pain  He subsequently was sent for an epidural injection back in December with minimal improvement of symptoms  In follow-up he had a repeat epidural injection this past Tuesday  · Patient reports that the back pain has continued  He also notes that he was unable to get up early morning of admission  · Upon arrival to the hospital he was able to stand under his own volition however is extremely uncomfortable and requires assistance for stability  Today some improvement with PT walked in hallway however pt reports extremely uncomfortable /painful   · No PVR   · Appreciate Neurosurgery, discussed with PA this am in regard to note yesterday as attending suggestive of possible need to do surgery  · MRi lumbar spine: 1   Severe chronic disc and facet degenerative change throughout the lumbar spine, most prominent at L4-5 resulting in significant encroachment of the exiting L4 nerves and traversing L5 nerve roots  2   No evidence of acute disc herniation, epidural collection or hematoma  · Will discuss plan of care tomorrow with neurosx attending just placed recs and considering surgery need cardiac clearance     · Discussed with neurosx the needs for sq heparin if ok < will start now    · Appreciated cards for possible clearance, pt requesting cards touch base with pts cardiologist which would be done on monday  · Start at [place] 22/2/19  2mg dexamethasone q6 hrs (will need to trend bs may need to consider insulin drip to catch up on bs) especially since catching up on original low dose

## 2019-02-03 NOTE — PLAN OF CARE
Problem: PHYSICAL THERAPY ADULT  Goal: Performs mobility at highest level of function for planned discharge setting  See evaluation for individualized goals  Treatment/Interventions: Functional transfer training, LE strengthening/ROM, Therapeutic exercise, Endurance training, Bed mobility, Gait training, Spoke to nursing, Spoke to case management, Spoke to advanced practitioner, OT  Equipment Recommended:  (Pt has RW, [facility])       See flowsheet documentation for full assessment, interventions and recommendations  Outcome: Progressing  Prognosis: Good  Problem List: Decreased strength, Impaired balance, Decreased endurance, Decreased mobility, Pain, Impaired judgement  Assessment: Pt encountered seated in chair, refusing to participate in ambulatory tasks this session  Pt did not appear to be in pain or distress, but reports that when he walked yesterday with son, he was limited by pain  Reports slight improvement with LSO brace  Extensive education given about benefits of mobiilty  Pt also reported he does not have to ambulate on steps if he goes into house through garage since he has a chair lift to 2nd floor if he needs it  Will continue to benefit from therapy services to promote increased mobility, and participation in ambulatory tasks to maximize activity tolerance, strength, and safety for safe return home  Barriers to Discharge: Inaccessible home environment  Barriers to Discharge Comments: ADILENE  Recommendation: Home PT (pending clarification on ADILENE home, increased ambulation)     PT - OK to Discharge: No    See flowsheet documentation for full assessment

## 2019-02-04 VITALS
WEIGHT: 238.1 LBS | OXYGEN SATURATION: 98 % | DIASTOLIC BLOOD PRESSURE: 84 MMHG | SYSTOLIC BLOOD PRESSURE: 135 MMHG | BODY MASS INDEX: 28.99 KG/M2 | TEMPERATURE: 98.4 F | RESPIRATION RATE: 18 BRPM | HEART RATE: 64 BPM | HEIGHT: 76 IN

## 2019-02-04 PROBLEM — N17.9 AKI (ACUTE KIDNEY INJURY) (HCC): Status: ACTIVE | Noted: 2019-02-04

## 2019-02-04 LAB
GLUCOSE SERPL-MCNC: 112 MG/DL (ref 65–140)
GLUCOSE SERPL-MCNC: 115 MG/DL (ref 65–140)
GLUCOSE SERPL-MCNC: 131 MG/DL (ref 65–140)
GLUCOSE SERPL-MCNC: 158 MG/DL (ref 65–140)
GLUCOSE SERPL-MCNC: 205 MG/DL (ref 65–140)
GLUCOSE SERPL-MCNC: 206 MG/DL (ref 65–140)
GLUCOSE SERPL-MCNC: 83 MG/DL (ref 65–140)

## 2019-02-04 PROCEDURE — 82948 REAGENT STRIP/BLOOD GLUCOSE: CPT

## 2019-02-04 PROCEDURE — 99233 SBSQ HOSP IP/OBS HIGH 50: CPT | Performed by: NEUROLOGICAL SURGERY

## 2019-02-04 PROCEDURE — 99239 HOSP IP/OBS DSCHRG MGMT >30: CPT | Performed by: NURSE PRACTITIONER

## 2019-02-04 RX ADMIN — INSULIN ASPART 10 UNITS: 100 INJECTION, SOLUTION INTRAVENOUS; SUBCUTANEOUS at 12:27

## 2019-02-04 RX ADMIN — SODIUM CHLORIDE 6 UNITS/HR: 9 INJECTION, SOLUTION INTRAVENOUS at 09:15

## 2019-02-04 RX ADMIN — GABAPENTIN 300 MG: 300 CAPSULE ORAL at 08:25

## 2019-02-04 RX ADMIN — METOPROLOL SUCCINATE 100 MG: 100 TABLET, FILM COATED, EXTENDED RELEASE ORAL at 08:25

## 2019-02-04 RX ADMIN — PANTOPRAZOLE SODIUM 40 MG: 40 TABLET, DELAYED RELEASE ORAL at 05:33

## 2019-02-04 RX ADMIN — ISOSORBIDE MONONITRATE 30 MG: 30 TABLET, EXTENDED RELEASE ORAL at 08:25

## 2019-02-04 RX ADMIN — INSULIN LISPRO 15 UNITS: 100 INJECTION, SOLUTION INTRAVENOUS; SUBCUTANEOUS at 12:27

## 2019-02-04 NOTE — PHYSICAL THERAPY NOTE
Attempted to see pt at 1130 for stair trial  Medical team present at this time  Per discussion with CM at [phone], pt unwilling to wait to be seen, insisting to leave  PT attempted to follow up at 1320, at this time pt discharging       Everett Hernandez, PT, DPT

## 2019-02-04 NOTE — PROGRESS NOTES
Patient and wife insistent that they need to leave prior to PT working on steps with patient  Explained importance of waiting for PT but they said they have to make it in time to drop him at home and then  grand daughter  Also, insulin gtt stopped approx  1230 and patient administer ordered mealtime insulins  Patient has eat half of lunch  Explained to patient that we should monitor himfor a little longer, but patient insistent on leaving  Told patient to monitor BS at home and to eat a snack

## 2019-02-04 NOTE — SOCIAL WORK
CM received notification that pt is to be discharged to home  Pt declining home PT as he feels they do not do anything he is also currently refusing to do work with PT for steps and discussed with pt that evaluation would be needed prior to discharge  Pt is agreeable to outpatient PT  EVANGELINA Gagnon notified as patient will need prescription at time of discharge and prescription obtained  CM provided pt with script and pt verbalizes understanding  Pt declines meds to beds at this time  CM reviewed d/c planning process including the following: identifying help at home, patient preference for d/c planning needs, Discharge Lounge, [facility] Meds to Bed program, availability of treatment team to discuss questions or concerns patient and/or family may have regarding understanding medications and recognizing signs and symptoms once discharged  CM also encouraged patient to follow up with all recommended appointments after discharge  Patient advised of importance for patient and family to participate in managing patients medical well being

## 2019-02-04 NOTE — ASSESSMENT & PLAN NOTE
· Patient reports worsening back pain since August of this past year  Patient noted that after his recent knee left-sided knee surgery he was having continued symptoms of back pain  He subsequently was sent for an epidural injection back in December with minimal improvement of symptoms  In follow-up he had a repeat epidural injection this past Tuesday  · Patient reports that the back pain has continued but feels better today with the brace on  · No PVR/ no bowel incontinence or constipation  · Appreciate Neurosurgery input, discussed in length with PA in the room and patient and his wife today  Patient is adamant that he will not be going through surgery as he has been told in the past he has very high risk  He is requesting to be discharged for 2nd opinion outside of the [facility]  · MRi lumbar spine: 1   Severe chronic disc and facet degenerative change throughout the lumbar spine, most prominent at L4-5 resulting in significant encroachment of the exiting L4 nerves and traversing L5 nerve roots  2   No evidence of acute disc herniation, epidural collection or hematoma  · Appreciated cards for clearance  · Patient is being transitioned off the insulin drip on to his normal home regimen for discharge

## 2019-02-04 NOTE — ASSESSMENT & PLAN NOTE
Lab Results   Component Value Date    HGBA1C 8 8 (H) 02/01/2019       Recent Labs      02/04/19   0530  02/04/19   0658  02/04/19   0907  02/04/19   1106   POCGLU  83  205*  158*  115       Blood Sugar Average: Last 72 hrs:  (P) 005 [id number]   · Patient is adamant about leaving as soon as possible  · Will discontinue insulin drip, place on subcutaneous insulin as he takes at home  He will resume his Lantus tonight

## 2019-02-04 NOTE — DISCHARGE SUMMARY
Discharge- Chely Mortensen 1945, 74 y o  male MRN: [id number]    Unit/Bed#: [facility] 199-73 Encounter: [id number]    Primary Care Provider: Boris Martínez MD   Date and time admitted to hospital: 1/31/2019  5:33 PM        * Back pain   Assessment & Plan    · Patient reports worsening back pain since August of this past year  Patient noted that after his recent knee left-sided knee surgery he was having continued symptoms of back pain  He subsequently was sent for an epidural injection back in December with minimal improvement of symptoms  In follow-up he had a repeat epidural injection this past Tuesday  · Patient reports that the back pain has continued but feels better today with the brace on  · No PVR/ no bowel incontinence or constipation  · Appreciate Neurosurgery input, discussed in length with PA in the room and patient and his wife today  Patient is adamant that he will not be going through surgery as he has been told in the past he has very high risk  He is requesting to be discharged for 2nd opinion outside of the [facility]  · MRi lumbar spine: 1   Severe chronic disc and facet degenerative change throughout the lumbar spine, most prominent at L4-5 resulting in significant encroachment of the exiting L4 nerves and traversing L5 nerve roots  2   No evidence of acute disc herniation, epidural collection or hematoma  · Appreciated cards for clearance  · Patient is being transitioned off the insulin drip on to his normal home regimen for discharge  Carcinoma in situ of bladder   Assessment & Plan    Patient with known history of bladder CA  Outpatient follow-up         Essential hypertension   Assessment & Plan    Metoprolol     CAD (coronary artery disease)   Assessment & Plan    · Resuming home medications for discharge       GARY (acute kidney injury) ([facility] [address] )   Assessment & Plan    · Present on admission and improved with IVF  · Close to baseline  · Avoid NSAIDS and reviewed with patient for discharge  · Follow up as outpatient      Chronic obstructive pulmonary disease ([facility] [address] )   Assessment & Plan    · Stable,  No acute exacerbation  Diabetes type 2, uncontrolled [facility])   Assessment & Plan    Lab Results   Component Value Date    HGBA1C 8 8 (H) 02/01/2019       Recent Labs      02/04/19   0530  02/04/19   0658  02/04/19   0907  02/04/19   1106   POCGLU  83  205*  158*  115       Blood Sugar Average: Last 72 hrs:  (P) 228 [id number]   · Patient is adamant about leaving as soon as possible  · Will discontinue insulin drip, place on subcutaneous insulin as he takes at home  He will resume his Lantus tonight  Discharging Physician / Practitioner: Leon Dowd  PCP: William Fermin MD  Admission Date:   Admission Orders     Ordered        01/31/19 1737  Inpatient Admission  Once             Discharge Date: 02/04/19    Resolved Problems  Date Reviewed: 2/4/2019    None          Consultations During Hospital Stay:  · Neurosurgery  · Cardiology    Procedures Performed:   · MRI of the lumbar spine:  Severe chronic disc and facet degenerative change throughout the lumbar spine, most prominent at L4-5 resulting in significant encroachment of the exiting L4 nerves and traversing L5 nerve roots  2   No evidence of acute disc herniation, epidural collection or hematoma  Significant Findings / Test Results:   · None    Incidental Findings:   · None     Test Results Pending at Discharge (will require follow up): · None     Outpatient Tests Requested:  · None    Complications:  None    Reason for Admission:  Back pain and left leg weakness    Hospital Course:     Aryan Mcclelland is a 74 y o  male patient who originally presented to the hospital on 1/31/2019 due to acute left lower extremity weakness and back pain  Patient has a known history of spinal stenosis and has been getting treated for low back pain since December    He was given an epidural steroid injection and states since that injection, his pain has increased and was so bad that he had inability to walk  This is when he presented to the emergency department  Neurosurgery was consulted  An MRI was performed  Severe stenosis was again noted most prominent at L4-L5 resulting in significant encroachment of the L 4 nerve enters for [place] L5 nerve root  There was no evidence of disc herniation  The plan was initially to work the patient up so that he could have surgery  This was explained by the neurosurgeon to the patient on Friday  He was placed on an insulin drip on Sunday and cardiology deemed him a high risk surgical candidate  Fortunately on Monday morning patient was adamant that he was not going to be having surgery  He was displeased with the explanation of the procedure and states no one gave him the option of having surgery versus not having surgery  He clearly states that he has been told he should not have surgery by other orthopedic surgeons in the past   We attempted to have patient seen by physical therapy to walk up and down steps as he is steps at home, but he did not wait for that evaluation  He was on an insulin drip and orders were placed to give subcutaneous insulin around his meal however again the patient did not want to wait for this  Discharge instructions were given  He will be seeking 2nd opinion for his back pain somewhere in [place]  Please see above list of diagnoses and related plan for additional information  Condition at Discharge: fair     Discharge Day Visit / Exam:     Subjective:  Still with back pain however it is much better  He is able to lift his left leg which she has not been able to do before  Is very displeased that he was not given the option of surgery and instead was just told he was having surgery  He "is leaving today " He states his back pain is slightly better with the brace on  He will agree to outpatient physical therapy    He states he is going to seek a 2nd opinion in [place]  Vitals: Blood Pressure: 135/84 (02/04/19 0700)  Pulse: 64 (02/04/19 0700)  Temperature: 98 4 °F (36 9 °C) (02/04/19 0700)  Temp Source: Oral (02/04/19 0700)  Respirations: 18 (02/04/19 0700)  Height: 6' 4" (193 cm) (02/01/19 0900)  Weight - Scale: 108 kg (238 lb 1 6 oz) (02/01/19 0900)  SpO2: 98 % (02/04/19 0700)  Exam:   Physical Exam   Constitutional: He is oriented to person, place, and time  No distress  HENT:   Head: Normocephalic  Eyes: Pupils are equal, round, and reactive to light  Neck: Normal range of motion  Neck supple  Cardiovascular: Normal rate, regular rhythm and normal heart sounds  Pulmonary/Chest: Effort normal and breath sounds normal    Abdominal: Soft  Bowel sounds are normal    Musculoskeletal: Normal range of motion  Neurological: He is alert and oriented to person, place, and time  Skin: Skin is warm and dry  Psychiatric: He has a normal mood and affect  Clearly expressing his dissatisfaction  Calm  Discussion with Family:  Wife is at bedside    Discharge instructions/Information to patient and family:   See after visit summary for information provided to patient and family  Provisions for Follow-Up Care:  See after visit summary for information related to follow-up care and any pertinent home health orders  Disposition:     Home    For Discharges to [facility] SN[facility]:   · Not Applicable to this Patient - Not Applicable to this Patient    Planned Readmission:  Not anticipated     Discharge Statement:  I spent 42 minutes discharging the patient  This time was spent on the day of discharge  I had direct contact with the patient on the day of discharge  Greater than 50% of the total time was spent examining patient, answering all patient questions, arranging and discussing plan of care with patient as well as directly providing post-discharge instructions    Additional time then spent on discharge activities  Discharge Medications:  See after visit summary for reconciled discharge medications provided to patient and family        ** Please Note: This note has been constructed using a voice recognition system **

## 2019-02-04 NOTE — PROGRESS NOTES
Progress Note - Neurosurgery   Bertrand Rodriguez 74 y o  male MRN: [id number]  [facility] Encounter: [id number]      Assessment:  1  Low back pain with bilateral leg radiculopathy  2  Spinal stenosis  3  History of bladder cancer on chemotherapy  4  History of bilateral knee replacement  5  Status post  triple cardiac stent placement  6  History of diabetes with peripheral neuropathy  7  Hypertension  8  Status post bilateral rotator cuff repair        Plan:   § Exam:    A&OX3, Finger to nose is normal and without drift and CROCKER with some 4+/ weakness in the left HF and KE drift bilaterally  Left DF 4/5  Right leg strength 5/5  Right DF/PF normal  Decreased LT and PP in the legs below knees bilaterally  § Images: MRI of Lumbar spine on 01/31/19 demonstrates severe DJD throughout the lumbar spine, most significant at L4-5 with significant encroachment of exiting L4 nerve root and traversing  L5 nerve roots  ? Pain control:  Per primary team  § Recommend APS evaluation and treatment  § Pain management  ? DVT ppx:   § SCDs-bilateral legs  ? Activity:  As tolerated  ? PT/OT evaluation & treatment  ? Brace:  LSO brace  Wear when upright and at> 45 degree head of bed   ? Medical Mx:  Per primary team  ? Neurocheck:  Routine  ? Patient was being evaluated over the weekends for neurogenic claudication for age related DJD lumbar stenosis, and PT/OT evaluation  No surgery was set up as definitive and I saw him this morning and  he states that he still has some  neurogenic claudication, but noticed slight improvement on the left leg weakness-( weakness during left leg HF/KE compared to the right leg)  Pain was less and he was able to stand up and walk using walker, which he had limitation on the first day of admission  Patient doesn't want to stay for PT/OT evaluation and wanted to go home  Per SLIM, patient requested to be D/C   Dr Kym Cabrera discussed with the patient and his wife and gave him the option to be seen at [facility] and Patient says he wants to go home and discuss with his PCP  They took business cards  Subjective/Objective   Chief Complaint: "I have back pain, but has some improvement in my left leg weakness"/progress note    Subjective:  Patient noticed slight improvement with his left leg weakness but T complains of back pain especially during walking and his walking [place] did not increase  Has shooting down bilateral lower leg pain left-sided more the right-sided  LSO brace helped him and he would like to wear it  Patient appears angry because he was told he is going to have PCDF and he was NPO, but was canceled and she states no one discussed with him about the type of procedure and the risks and benefits/alternatives  Objective:  Patient is sitting in the recliner, wearing LSO, in no pain distress    I/O       02/02 0701 - 02/03 0700 02/03 0701 - 02/04 0700 02/04 0701 - 02/05 0700    P  O  1740 820 240    I V  (mL/kg) 93 3 (0 9) 176 5 (1 6) 33 2 (0 3)    Total Intake(mL/kg) 1833 3 (17) 996 5 (9 2) 273 2 (2 5)    Urine (mL/kg/hr) 1625 (0 6) 1650 (0 6) 450 (0 7)    Total Output 1625 1650 450    Net +208 3 -653 5 -176 8           Unmeasured Stool Occurrence 1 x            Invasive Devices     Peripheral Intravenous Line            Peripheral IV 02/03/19 Right Antecubital less than 1 day                Physical Exam:  Vitals: Blood pressure 135/84, pulse 64, temperature 98 4 °F (36 9 °C), temperature source Oral, resp  rate 18, height 6' 4" (1 93 m), weight 108 kg (238 lb 1 6 oz), SpO2 98 %  ,Body mass index is 28 98 kg/m²          General appearance: alert, appears stated age, cooperative and no distress  Head: Normocephalic, without obvious abnormality, atraumatic  Eyes: EOMI, conjugate bilateral  Neck: supple, symmetrical, trachea midline and NT  Back:  Wearing LSO, not examined  Lungs: non labored breathing  Heart: regular heart rate  Neurologic:   Mental status: Alert, oriented x3, thought content appropriate  Cranial nerves: grossly intact (Cranial nerves II-XII)  Sensory: normal to light touch throughout except bilateral distal leg and feet decreased sensation to light touch  Motor: moving all extremities without focal weakness; strength is 5/5 throughout except weakness in the left HF and KE, 4+/5  Reflexes: 2+ and symmetric; without clonus and Babinski negative bilaterally  Coordination: finger to nose normal bilaterally, no drift bilaterally      Lab Results:    Results from last 7 days  Lab Units 02/02/19  0509 02/01/19  0503   WBC Thousand/uL 8 25 9 14  9 14   HEMOGLOBIN g/dL 12 7 11 9*  11 9*   HEMATOCRIT % 36 3* 34 3*  34 3*   PLATELETS Thousands/uL 165 160  160   NEUTROS PCT % 77* 70   MONOS PCT % 5 7       Results from last 7 days  Lab Units 02/03/19  0515 02/02/19  0509 02/01/19 2124 02/01/19  0503   POTASSIUM mmol/L 4 1 4 4 4 4 4 1  4 1   CHLORIDE mmol/L 103 101 98* 102  103   CO2 mmol/L 26 26 24 26  26   BUN mg/dL 35* 32* 38* 30*  33*   CREATININE mg/dL 1 52* 1 53* 1 84* 1 62*  1 59*   CALCIUM mg/dL 9 2 8 9 8 6 8 7  8 7   ALK PHOS U/L  --  89  --  81  83   ALT U/L  --  113*  --  86*  85*   AST U/L  --  47*  --  42  44       Results from last 7 days  Lab Units 02/01/19  0503   MAGNESIUM mg/dL 2 1       Results from last 7 days  Lab Units 02/01/19  0503   PHOSPHORUS mg/dL 3 1       Results from last 7 days  Lab Units 02/03/19  0938   INR  0 98     No results found for: TROPONINT  ABG:No results found for: PHART, KIJ7PFR, PO2ART, RTO6LQY, P7DKUSRJ, BEART, SOURCE    Imaging Studies: I have personally reviewed pertinent reports  and I have personally reviewed pertinent films in PACS    EKG, Pathology, and Other Studies: I have personally reviewed pertinent reports        VTE Pharmacologic Prophylaxis: Sequential compression device (Venodyne)     VTE Mechanical Prophylaxis:  Hold of heparin subcu if procedure is to be done

## 2019-02-04 NOTE — SOCIAL WORK
CM discussed pt at care coordination rounds  Pt is not medically stable for discharge  CM will continue to follow for medical clearance and final discharge disposition

## 2019-02-04 NOTE — PLAN OF CARE
DISCHARGE PLANNING     Discharge to home or other facility with appropriate resources Progressing        DISCHARGE PLANNING - CARE MANAGEMENT     Discharge to post-acute care or home with appropriate resources Progressing        Knowledge Deficit     Patient/family/caregiver demonstrates understanding of disease process, treatment plan, medications, and discharge instructions Progressing        MUSCULOSKELETAL - ADULT     Maintain or return mobility to safest level of function Progressing     Maintain proper alignment of affected body part Progressing        PAIN - ADULT     Verbalizes/displays adequate comfort level or baseline comfort level Progressing        Potential for Falls     Patient will remain free of falls Progressing        SAFETY ADULT     Patient will remain free of falls Progressing     Maintain or return to baseline ADL function Progressing     Maintain or return mobility status to optimal level Progressing

## 2019-02-04 NOTE — ASSESSMENT & PLAN NOTE
· Present on admission and improved with IVF  · Close to baseline  · Avoid NSAIDS and reviewed with patient for discharge  · Follow up as outpatient

## 2019-02-05 ENCOUNTER — TRANSITIONAL CARE MANAGEMENT (OUTPATIENT)
Dept: INTERNAL MEDICINE CLINIC | Facility: CLINIC | Age: 74
End: 2019-02-05

## 2019-02-07 ENCOUNTER — OFFICE VISIT (OUTPATIENT)
Dept: INTERNAL MEDICINE CLINIC | Facility: CLINIC | Age: 74
End: 2019-02-07
Payer: MEDICARE

## 2019-02-07 VITALS
WEIGHT: 245 LBS | SYSTOLIC BLOOD PRESSURE: 135 MMHG | HEART RATE: 86 BPM | RESPIRATION RATE: 14 BRPM | TEMPERATURE: 97.5 F | DIASTOLIC BLOOD PRESSURE: 71 MMHG | HEIGHT: 76 IN | BODY MASS INDEX: 29.83 KG/M2

## 2019-02-07 DIAGNOSIS — M54.42 CHRONIC MIDLINE LOW BACK PAIN WITH BILATERAL SCIATICA: ICD-10-CM

## 2019-02-07 DIAGNOSIS — N18.30 CKD (CHRONIC KIDNEY DISEASE) STAGE 3, GFR 30-59 ML/MIN (HCC): ICD-10-CM

## 2019-02-07 DIAGNOSIS — M54.41 CHRONIC MIDLINE LOW BACK PAIN WITH BILATERAL SCIATICA: ICD-10-CM

## 2019-02-07 DIAGNOSIS — E11.65 UNCONTROLLED TYPE 2 DIABETES MELLITUS WITH HYPERGLYCEMIA (HCC): Primary | ICD-10-CM

## 2019-02-07 DIAGNOSIS — I25.10 CORONARY ARTERY DISEASE INVOLVING NATIVE CORONARY ARTERY OF NATIVE HEART WITHOUT ANGINA PECTORIS: ICD-10-CM

## 2019-02-07 DIAGNOSIS — G89.29 CHRONIC MIDLINE LOW BACK PAIN WITH BILATERAL SCIATICA: ICD-10-CM

## 2019-02-07 PROCEDURE — 99496 TRANSJ CARE MGMT HIGH F2F 7D: CPT | Performed by: INTERNAL MEDICINE

## 2019-02-07 NOTE — ASSESSMENT & PLAN NOTE
Back pain spinal stenosis unable to walk urinary incontinence needs decompression laminectomy urgently encouraged him to go to [facility] [place] and get evaluated

## 2019-02-07 NOTE — ASSESSMENT & PLAN NOTE
Stage III chronic renal failure will check lab work when he comes back  Not taking any NSAIDs    No evidence of heart failure

## 2019-02-07 NOTE — PATIENT INSTRUCTIONS
Go to the [address] [place] [address] immediately to get her back evaluated and a for decompression laminectomy  Coronary artery disease appears stable you have no angina if he gets chest pain let us know or go to the emergency room immediately no change in medications  Continue diabetes as you are  Check her basic metabolic panel magnesium and CBC when you come back will see her in 4 weeks

## 2019-02-07 NOTE — PROGRESS NOTES
Assessment/Plan: On able to walk in a wheelchair spinal stenosis needs urgent neuro surgical evaluation for laminectomy decompression discussed with the patient going to [address] for 2nd opinion    Back pain  Back pain spinal stenosis unable to walk urinary incontinence needs decompression laminectomy urgently encouraged him to go to [address] and get evaluated  CKD (chronic kidney disease) stage 3, GFR 30-59 ml/min  Stage III chronic renal failure will check lab work when he comes back  Not taking any NSAIDs  No evidence of heart failure    CAD (coronary artery disease)  Stable no angina       Diagnoses and all orders for this visit:    Uncontrolled type 2 diabetes mellitus with hyperglycemia (HCC)  -     CBC and differential; Future  -     Basic metabolic panel; Future  -     Magnesium; Future  -     Sedimentation rate, automated; Future  -     C-reactive protein; Future    Coronary artery disease involving native coronary artery of native heart without angina pectoris  -     CBC and differential; Future  -     Basic metabolic panel; Future  -     Magnesium; Future  -     Sedimentation rate, automated; Future  -     C-reactive protein; Future    CKD (chronic kidney disease) stage 3, GFR 30-59 ml/min ([facility])  -     CBC and differential; Future  -     Basic metabolic panel; Future  -     Magnesium; Future  -     Sedimentation rate, automated; Future  -     C-reactive protein; Future    Chronic midline low back pain with bilateral sciatica  -     CBC and differential; Future  -     Basic metabolic panel; Future  -     Magnesium; Future  -     Sedimentation rate, automated; Future  -     C-reactive protein; Future    Other orders  -     Cholecalciferol 81106 units capsule; Take 50,000 Units by mouth every 30 (thirty) days  -     metFORMIN (GLUCOPHAGE) 1000 MG tablet;  Take 1,000 mg by mouth 2 (two) times a day with meals Take 1/2 tablet in the morning          Subjective: Patient ID: Bulmaro Mar is a 74 y o  male  Chief Complaint   Patient presents with    Transition of Care Management     D/C [facility] 2 4 19 for back pain         Current Outpatient Prescriptions:     aspirin 81 MG tablet, Take 81 mg by mouth daily  , Disp: , Rfl:     Azelastine HCl 0 15 % SOLN, Inhale 1 spray 2 (two) times a day, Disp: , Rfl: 0    bimatoprost (LUMIGAN) 0 01 % ophthalmic drops, 1 drop daily at bedtime  , Disp: , Rfl:     bismuth subsalicylate (PEPTO BISMOL) 262 MG chewable tablet, Chew 524 mg daily as needed for indigestion  , Disp: , Rfl:     Blood Glucose Monitoring Suppl (ACCU-CHEK RITA PLUS) w/Device KIT, USE AS DIRECTED  *PA*, Disp: 1 kit, Rfl: 0    Cholecalciferol 78133 units capsule, Take 50,000 Units by mouth every 30 (thirty) days, Disp: , Rfl:     fluocinonide (LIDEX) 0 05 % cream, Apply topically 2 (two) times a day , Disp: , Rfl:     fluticasone (FLONASE) 50 mcg/act nasal spray, 1 spray into each nostril daily, Disp: 16 g, Rfl: 3    furosemide (LASIX) 20 mg tablet, TAKE 1 TABLET DAILY, Disp: 90 tablet, Rfl: 1    gabapentin (NEURONTIN) 300 mg capsule, Take 2 capsules at bedtime, Disp: 180 capsule, Rfl: 1    Insulin Pen Needle 31G X 8 MM MISC, BD Ultra-Fine Short Pen Needle 31 gauge x 5/16", Disp: , Rfl:     isosorbide mononitrate (IMDUR) 30 mg 24 hr tablet, Take 1 tablet (30 mg total) by mouth daily for 90 days, Disp: 90 tablet, Rfl: 1    LANTUS SOLOSTAR 100 units/mL injection pen, 45 units bedtime, Disp: 5 pen, Rfl: 0    loperamide (IMODIUM) 2 mg capsule, Take by mouth, Disp: , Rfl:     metFORMIN (GLUCOPHAGE) 1000 MG tablet, Take 1,000 mg by mouth 2 (two) times a day with meals Take 1/2 tablet in the morning, Disp: , Rfl:     metoprolol succinate (TOPROL-XL) 100 mg 24 hr tablet, Take 100 mg by mouth daily  , Disp: , Rfl:     Mirabegron ER (MYRBETRIQ) 50 MG TB24, Take by mouth daily, Disp: , Rfl:     multivitamin (THERAGRAN) TABS, Take 1 tablet by mouth daily  , Disp: , Rfl:     NOVOLOG FLEXPEN 100 units/mL injection pen, Inject 15 Units under the skin 3 (three) times a day with meals, Disp: 5 pen, Rfl: 3    omeprazole (PriLOSEC) 20 mg delayed release capsule, TAKE 1 CAPSULE DAILY, Disp: 90 capsule, Rfl: 1    Patient comes in post hospitalization was admitted with the back pain and weakness back pain worsened since August he has gone from walking to being in a wheelchair and now he has urinary incontinence  He was evaluated by neuro surgery he wanted to get a 2nd opinion he is calling pen the going to call him today for an evaluation  MRI of the lumbar spine showed severe chronic disease and facet degeneration throughout the lumbar spine mostly L4-L5 significant encroachment on the L4 nerve roots transverse in the L5 nerve roots no evidence of acute disc herniation epidural collection of her or hematoma    They gave him a course of steroids while he was in the hospital he was on an insulin drip being diabetic    History of bladder cancer    Blood pressure on beta-blockers metoprolol he also has coronary artery disease    Chronic renal failure with a creatinine 1 6 GFR 48    Diabetes hemoglobin A1c of 8 8 does follow up with endocrinology    He was seen by Cardiology and clear for the back surgery decompression laminectomy he is intermediate risk because of a history of coronary artery disease but he has good ejection fraction no evidence of heart failure and no angina      Neuro surgery note review his disability    Back pain and weakness trouble some of the last 2 months declining walking distance    [place] call on a [place] was [place] of 2018    Since new year's he is taking using a walker to support himself    Had 2 epidurals injections    Last 3 days which is possibly a week ago ended up in the wheelchair    Has not been able to start up and walks since sense to rehab the rehab mention that nothing could be done and needed to be seen by a neurosurgeon  Has bladder cancer regular check up polyp removed    The neurogenic claudication on able to stand or walk for fear that he would fall    Continent right now but he has developed some urinary incontinence since he left the hospital    At the present time he is still in a wheelchair on able to walk is developed the urinary incontinence I told him of most important for you to get a 2nd opinion [address] so they can go ahead and treat her back most likely you need a decompression laminectomy otherwise he is going to end up in a wheelchair the rest of your life  Emotional support given denies any chest pain palpitation shortness of breath  I told him the [address] is on [facility] and they can see all our records  The following portions of the patient's history were reviewed and updated as appropriate: allergies, current medications, past family history, past medical history, past social history, past surgical history and problem list     Review of Systems   Constitutional: Negative for activity change, appetite change, fatigue, fever and unexpected weight change  HENT: Negative for congestion, ear pain, hearing loss, mouth sores, postnasal drip, rhinorrhea, sore throat, trouble swallowing and voice change  Eyes: Negative for pain, redness and visual disturbance  Respiratory: Negative for cough, chest tightness, shortness of breath and wheezing  Cardiovascular: Negative for chest pain, palpitations and leg swelling  Gastrointestinal: Negative for abdominal distention, abdominal pain, blood in stool, constipation, diarrhea and nausea  Endocrine: Negative for cold intolerance, heat intolerance, polydipsia, polyphagia and polyuria  Genitourinary: Negative for difficulty urinating, dysuria, flank pain, frequency, hematuria and urgency  Musculoskeletal: Positive for back pain   Negative for arthralgias, gait problem, joint swelling and myalgias  Skin: Negative for color change and pallor  Neurological: Positive for weakness  Negative for dizziness, tremors, seizures, syncope, numbness and headaches  Hematological: Negative for adenopathy  Does not bruise/bleed easily  Psychiatric/Behavioral: Negative  Negative for sleep disturbance  The patient is not nervous/anxious            Objective:    Results for orders placed or performed during the hospital encounter of 01/31/19   Comprehensive metabolic panel   Result Value Ref Range    Sodium 136 136 - 145 mmol/L    Potassium 4 1 3 5 - 5 3 mmol/L    Chloride 102 100 - 108 mmol/L    CO2 26 21 - 32 mmol/L    ANION GAP 8 4 - 13 mmol/L    BUN 30 (H) 5 - 25 mg/dL    Creatinine 1 62 (H) 0 60 - 1 30 mg/dL    Glucose 328 (H) 65 - 140 mg/dL    Calcium 8 7 8 3 - 10 1 mg/dL    AST 42 5 - 45 U/L    ALT 86 (H) 12 - 78 U/L    Alkaline Phosphatase 81 46 - 116 U/L    Total Protein 6 6 6 4 - 8 2 g/dL    Albumin 3 3 (L) 3 5 - 5 0 g/dL    Total Bilirubin 0 53 0 20 - 1 00 mg/dL    eGFR 47 ml/min/1 73sq m   CBC and differential   Result Value Ref Range    WBC 9 14 4 31 - 10 16 Thousand/uL    RBC 3 74 (L) 3 88 - 5 62 Million/uL    Hemoglobin 11 9 (L) 12 0 - 17 0 g/dL    Hematocrit 34 3 (L) 36 5 - 49 3 %    MCV 92 82 - 98 fL    MCH 31 8 26 8 - 34 3 pg    MCHC 34 7 31 4 - 37 4 g/dL    RDW 12 8 11 6 - 15 1 %    MPV 11 1 8 9 - 12 7 fL    Platelets 168 911 - 783 Thousands/uL    nRBC 0 /100 WBCs    Neutrophils Relative 70 43 - 75 %    Immat GRANS % 1 0 - 2 %    Lymphocytes Relative 22 14 - 44 %    Monocytes Relative 7 4 - 12 %    Eosinophils Relative 0 0 - 6 %    Basophils Relative 0 0 - 1 %    Neutrophils Absolute 6 43 1 85 - 7 62 Thousands/µL    Immature Grans Absolute 0 05 0 00 - 0 20 Thousand/uL    Lymphocytes Absolute 1 99 0 60 - 4 47 Thousands/µL    Monocytes Absolute 0 64 0 17 - 1 22 Thousand/µL    Eosinophils Absolute 0 02 0 00 - 0 61 Thousand/µL    Basophils Absolute 0 01 0 00 - 0 10 Thousands/µL   Comprehensive metabolic panel   Result Value Ref Range    Sodium 136 136 - 145 mmol/L    Potassium 4 1 3 5 - 5 3 mmol/L    Chloride 103 100 - 108 mmol/L    CO2 26 21 - 32 mmol/L    ANION GAP 7 4 - 13 mmol/L    BUN 33 (H) 5 - 25 mg/dL    Creatinine 1 59 (H) 0 60 - 1 30 mg/dL    Glucose 336 (H) 65 - 140 mg/dL    Calcium 8 7 8 3 - 10 1 mg/dL    AST 44 5 - 45 U/L    ALT 85 (H) 12 - 78 U/L    Alkaline Phosphatase 83 46 - 116 U/L    Total Protein 6 6 6 4 - 8 2 g/dL    Albumin 3 4 (L) 3 5 - 5 0 g/dL    Total Bilirubin 0 53 0 20 - 1 00 mg/dL    eGFR 48 ml/min/1 73sq m   Magnesium   Result Value Ref Range    Magnesium 2 1 1 6 - 2 6 mg/dL   Phosphorus   Result Value Ref Range    Phosphorus 3 1 2 3 - 4 1 mg/dL   CBC (With Platelets)   Result Value Ref Range    WBC 9 14 4 31 - 10 16 Thousand/uL    RBC 3 74 (L) 3 88 - 5 62 Million/uL    Hemoglobin 11 9 (L) 12 0 - 17 0 g/dL    Hematocrit 34 3 (L) 36 5 - 49 3 %    MCV 92 82 - 98 fL    MCH 31 8 26 8 - 34 3 pg    MCHC 34 7 31 4 - 37 4 g/dL    RDW 12 8 11 6 - 15 1 %    Platelets 195 635 - 883 Thousands/uL    MPV 11 1 8 9 - 12 7 fL   Basic metabolic panel   Result Value Ref Range    Sodium 131 (L) 136 - 145 mmol/L    Potassium 4 4 3 5 - 5 3 mmol/L    Chloride 98 (L) 100 - 108 mmol/L    CO2 24 21 - 32 mmol/L    ANION GAP 9 4 - 13 mmol/L    BUN 38 (H) 5 - 25 mg/dL    Creatinine 1 84 (H) 0 60 - 1 30 mg/dL    Glucose 598 (HH) 65 - 140 mg/dL    Calcium 8 6 8 3 - 10 1 mg/dL    eGFR 41 ml/min/1 73sq m   Hemoglobin A1C   Result Value Ref Range    Hemoglobin A1C 8 8 (H) 4 2 - 6 3 %     mg/dl   Comprehensive metabolic panel   Result Value Ref Range    Sodium 134 (L) 136 - 145 mmol/L    Potassium 4 4 3 5 - 5 3 mmol/L    Chloride 101 100 - 108 mmol/L    CO2 26 21 - 32 mmol/L    ANION GAP 7 4 - 13 mmol/L    BUN 32 (H) 5 - 25 mg/dL    Creatinine 1 53 (H) 0 60 - 1 30 mg/dL    Glucose 318 (H) 65 - 140 mg/dL    Calcium 8 9 8 3 - 10 1 mg/dL    AST 47 (H) 5 - 45 U/L     (H) 12 - 78 U/L    Alkaline Phosphatase 89 46 - 116 U/L    Total Protein 7 1 6 4 - 8 2 g/dL    Albumin 3 5 3 5 - 5 0 g/dL    Total Bilirubin 0 49 0 20 - 1 00 mg/dL    eGFR 51 ml/min/1 73sq m   CBC and differential   Result Value Ref Range    WBC 8 25 4 31 - 10 16 Thousand/uL    RBC 3 94 3 88 - 5 62 Million/uL    Hemoglobin 12 7 12 0 - 17 0 g/dL    Hematocrit 36 3 (L) 36 5 - 49 3 %    MCV 92 82 - 98 fL    MCH 32 2 26 8 - 34 3 pg    MCHC 35 0 31 4 - 37 4 g/dL    RDW 12 6 11 6 - 15 1 %    MPV 10 8 8 9 - 12 7 fL    Platelets 027 523 - 747 Thousands/uL    nRBC 0 /100 WBCs    Neutrophils Relative 77 (H) 43 - 75 %    Immat GRANS % 1 0 - 2 %    Lymphocytes Relative 17 14 - 44 %    Monocytes Relative 5 4 - 12 %    Eosinophils Relative 0 0 - 6 %    Basophils Relative 0 0 - 1 %    Neutrophils Absolute 6 38 1 85 - 7 62 Thousands/µL    Immature Grans Absolute 0 06 0 00 - 0 20 Thousand/uL    Lymphocytes Absolute 1 41 0 60 - 4 47 Thousands/µL    Monocytes Absolute 0 39 0 17 - 1 22 Thousand/µL    Eosinophils Absolute 0 00 0 00 - 0 61 Thousand/µL    Basophils Absolute 0 01 0 00 - 0 10 Thousands/µL   Basic metabolic panel   Result Value Ref Range    Sodium 135 (L) 136 - 145 mmol/L    Potassium 4 1 3 5 - 5 3 mmol/L    Chloride 103 100 - 108 mmol/L    CO2 26 21 - 32 mmol/L    ANION GAP 6 4 - 13 mmol/L    BUN 35 (H) 5 - 25 mg/dL    Creatinine 1 52 (H) 0 60 - 1 30 mg/dL    Glucose 158 (H) 65 - 140 mg/dL    Calcium 9 2 8 3 - 10 1 mg/dL    eGFR 51 ml/min/1 73sq m   Protime-INR   Result Value Ref Range    Protime 13 1 11 8 - 14 2 seconds    INR 0 98 0 86 - 1 17   UA (URINE) with reflex to Microscopic   Result Value Ref Range    Color, UA Yellow     Clarity, UA Clear     Specific Gravity, UA 1 017 1 003 - 1 030    pH, UA 5 5 4 5 - 8 0    Leukocytes, UA Trace (A) Negative    Nitrite, UA Negative Negative    Protein,  (2+) (A) Negative mg/dl    Glucose,  (1/10%) (A) Negative mg/dl    Ketones, UA Negative Negative mg/dl    Urobilinogen, UA 1 0 0 2, 1 0 E U /dl E U /dl    Bilirubin, UA Negative Negative    Blood, UA Small (A) Negative   Urine Microscopic   Result Value Ref Range    RBC, UA 2-4 (A) None Seen, 0-5 /hpf    WBC, UA 4-10 (A) None Seen, 0-5, 5-55, 5-65 /hpf    Epithelial Cells None Seen None Seen, Occasional /hpf    Bacteria, UA None Seen None Seen, Occasional /hpf    Hyaline Casts, UA None Seen None Seen /lpf   Fingerstick Glucose (POCT)   Result Value Ref Range    POC Glucose 315 (H) 65 - 140 mg/dl   Fingerstick Glucose (POCT)   Result Value Ref Range    POC Glucose 285 (H) 65 - 140 mg/dl   Fingerstick Glucose (POCT)   Result Value Ref Range    POC Glucose 264 (H) 65 - 140 mg/dl   Fingerstick Glucose (POCT)   Result Value Ref Range    POC Glucose 381 (H) 65 - 140 mg/dl   Fingerstick Glucose (POCT)   Result Value Ref Range    POC Glucose 424 (H) 65 - 140 mg/dl   Fingerstick Glucose (POCT)   Result Value Ref Range    POC Glucose >500 (HH) 65 - 140 mg/dl   Fingerstick Glucose (POCT)   Result Value Ref Range    POC Glucose 366 (H) 65 - 140 mg/dl   Fingerstick Glucose (POCT)   Result Value Ref Range    POC Glucose 341 (H) 65 - 140 mg/dl   Fingerstick Glucose (POCT)   Result Value Ref Range    POC Glucose 280 (H) 65 - 140 mg/dl   Fingerstick Glucose (POCT)   Result Value Ref Range    POC Glucose 346 (H) 65 - 140 mg/dl   Fingerstick Glucose (POCT)   Result Value Ref Range    POC Glucose >500 (HH) 65 - 140 mg/dl   Fingerstick Glucose (POCT)   Result Value Ref Range    POC Glucose >500 (HH) 65 - 140 mg/dl   Fingerstick Glucose (POCT)   Result Value Ref Range    POC Glucose >500 (HH) 65 - 140 mg/dl   Fingerstick Glucose (POCT)   Result Value Ref Range    POC Glucose 441 (H) 65 - 140 mg/dl   Fingerstick Glucose (POCT)   Result Value Ref Range    POC Glucose 322 (H) 65 - 140 mg/dl   Fingerstick Glucose (POCT)   Result Value Ref Range    POC Glucose 296 (H) 65 - 140 mg/dl   Fingerstick Glucose (POCT)   Result Value Ref Range    POC Glucose 183 (H) 65 - 140 mg/dl   Fingerstick Glucose (POCT)   Result Value Ref Range    POC Glucose 159 (H) 65 - 140 mg/dl   Fingerstick Glucose (POCT)   Result Value Ref Range    POC Glucose 178 (H) 65 - 140 mg/dl   Fingerstick Glucose (POCT)   Result Value Ref Range    POC Glucose 249 (H) 65 - 140 mg/dl   Fingerstick Glucose (POCT)   Result Value Ref Range    POC Glucose 268 (H) 65 - 140 mg/dl   Fingerstick Glucose (POCT)   Result Value Ref Range    POC Glucose 196 (H) 65 - 140 mg/dl   Fingerstick Glucose (POCT)   Result Value Ref Range    POC Glucose 308 (H) 65 - 140 mg/dl   Fingerstick Glucose (POCT)   Result Value Ref Range    POC Glucose 180 (H) 65 - 140 mg/dl   Fingerstick Glucose (POCT)   Result Value Ref Range    POC Glucose 394 (H) 65 - 140 mg/dl   Fingerstick Glucose (POCT)   Result Value Ref Range    POC Glucose 273 (H) 65 - 140 mg/dl   Fingerstick Glucose (POCT)   Result Value Ref Range    POC Glucose 295 (H) 65 - 140 mg/dl   Fingerstick Glucose (POCT)   Result Value Ref Range    POC Glucose 206 (H) 65 - 140 mg/dl   Fingerstick Glucose (POCT)   Result Value Ref Range    POC Glucose 131 65 - 140 mg/dl   Fingerstick Glucose (POCT)   Result Value Ref Range    POC Glucose 83 65 - 140 mg/dl   Fingerstick Glucose (POCT)   Result Value Ref Range    POC Glucose 205 (H) 65 - 140 mg/dl   Fingerstick Glucose (POCT)   Result Value Ref Range    POC Glucose 158 (H) 65 - 140 mg/dl   Fingerstick Glucose (POCT)   Result Value Ref Range    POC Glucose 115 65 - 140 mg/dl   Fingerstick Glucose (POCT)   Result Value Ref Range    POC Glucose 112 65 - 140 mg/dl       /71 (BP Location: Left arm, Patient Position: Sitting, Cuff Size: Standard)   Pulse 86   Temp 97 5 °F (36 4 °C)   Resp 14   Ht 6' 4" (1 93 m)   Wt 111 kg (245 lb)   BMI 29 82 kg/m²      Physical Exam   Constitutional: He is oriented to person, place, and time  He appears well-developed and well-nourished  HENT:   Head: Normocephalic     Right Ear: External ear normal    Left Ear: External ear normal    Nose: Nose normal    Mouth/Throat: Oropharynx is clear and moist  No oropharyngeal exudate  Eyes: Pupils are equal, round, and reactive to light  Conjunctivae and EOM are normal    Neck: Normal range of motion  Neck supple  No thyromegaly present  Cardiovascular: Normal rate, regular rhythm, normal heart sounds and intact distal pulses  Exam reveals no gallop and no friction rub  No murmur heard  S1-S2 no gallops  Regular rhythm  Extremities no edema   Pulmonary/Chest: Effort normal and breath sounds normal  No respiratory distress  He has no wheezes  He has no rales  Lungs are clear no wheezing rales or rhonchi   Abdominal: Soft  Bowel sounds are normal  He exhibits no distension and no mass  There is no tenderness  There is no rebound and no guarding  Abdomen soft nontender   Musculoskeletal: Normal range of motion  Lymphadenopathy:     He has no cervical adenopathy  Neurological: He is alert and oriented to person, place, and time  He displays abnormal reflex  Generally weak on able to stand or walk   Skin: Skin is warm and dry  Psychiatric: He has a normal mood and affect  His behavior is normal  Judgment normal    Nursing note and vitals reviewed

## 2019-02-17 ENCOUNTER — HOSPITAL ENCOUNTER (EMERGENCY)
Facility: HOSPITAL | Age: 74
Discharge: HOME | End: 2019-02-17
Attending: EMERGENCY MEDICINE | Admitting: EMERGENCY MEDICINE
Payer: MEDICARE

## 2019-02-17 VITALS
BODY MASS INDEX: 29.41 KG/M2 | HEART RATE: 74 BPM | RESPIRATION RATE: 16 BRPM | WEIGHT: 241.6 LBS | DIASTOLIC BLOOD PRESSURE: 71 MMHG | SYSTOLIC BLOOD PRESSURE: 128 MMHG | OXYGEN SATURATION: 98 % | TEMPERATURE: 97.3 F

## 2019-02-17 DIAGNOSIS — B02.9 SHINGLES: Primary | ICD-10-CM

## 2019-02-17 PROCEDURE — 99282 EMERGENCY DEPT VISIT SF MDM: CPT

## 2019-02-17 RX ORDER — NAPROXEN 500 MG/1
500 TABLET ORAL 2 TIMES DAILY WITH MEALS
Qty: 30 TABLET | Refills: 0 | Status: SHIPPED | OUTPATIENT
Start: 2019-02-17 | End: 2019-05-08

## 2019-02-17 RX ORDER — ACYCLOVIR 400 MG/1
800 TABLET ORAL
Qty: 70 TABLET | Refills: 0 | Status: SHIPPED | OUTPATIENT
Start: 2019-02-17 | End: 2019-06-27

## 2019-02-17 NOTE — ED PROVIDER NOTES
History  Chief Complaint   Patient presents with    Rash     Reports rash behind ear, on head, and left arm  States rash is painful  Reports started 1 week ago  Pt offers no other complaints at this time       74y  o male with PMH of anemia, CAD, bladder cancer, DM, HTN, insomnia and TIA presents to the ER for rash to the left side of his head, left neck and left shoulder for 1 week  Patient has been apply Neosporin to the area  He describes his pain as burning and non-radiating  He rates his pain 7/10 and states pain comes and goes  He denies sick contacts or recent travel  He denies fever, chills, URI symptoms, chest pain, dyspnea, N/V/D, abdominal pain, weakness or paresthesias  History provided by:  Patient   used: No        Prior to Admission Medications   Prescriptions Last Dose Informant Patient Reported? Taking? Azelastine HCl 0 15 % SOLN  Self Yes No   Sig: Inhale 1 spray 2 (two) times a day   Blood Glucose Monitoring Suppl (ACCU-CHEK RITA PLUS) w/Device KIT   No No   Sig: USE AS DIRECTED  *PA*   Cholecalciferol 09096 units capsule   Yes No   Sig: Take 50,000 Units by mouth every 30 (thirty) days   Insulin Pen Needle 31G X 8 MM MISC  Self Yes No   Sig: BD Ultra-Fine Short Pen Needle 31 gauge x 5/16"   LANTUS SOLOSTAR 100 units/mL injection pen   No No   Si units bedtime   Mirabegron ER (MYRBETRIQ) 50 MG TB24   Yes No   Sig: Take by mouth daily   NOVOLOG FLEXPEN 100 units/mL injection pen   No No   Sig: Inject 15 Units under the skin 3 (three) times a day with meals   aspirin 81 MG tablet  Self Yes No   Sig: Take 81 mg by mouth daily  bimatoprost (LUMIGAN) 0 01 % ophthalmic drops  Self Yes No   Si drop daily at bedtime  bismuth subsalicylate (PEPTO BISMOL) 262 MG chewable tablet  Self Yes No   Sig: Chew 524 mg daily as needed for indigestion  fluocinonide (LIDEX) 0 05 % cream  Self Yes No   Sig: Apply topically 2 (two) times a day     fluticasone (FLONASE) 50 mcg/act nasal spray  Self No No   Si spray into each nostril daily   furosemide (LASIX) 20 mg tablet   No No   Sig: TAKE 1 TABLET DAILY   gabapentin (NEURONTIN) 300 mg capsule   No No   Sig: Take 2 capsules at bedtime   isosorbide mononitrate (IMDUR) 30 mg 24 hr tablet  Self No No   Sig: Take 1 tablet (30 mg total) by mouth daily for 90 days   loperamide (IMODIUM) 2 mg capsule  Self Yes No   Sig: Take by mouth   metFORMIN (GLUCOPHAGE) 1000 MG tablet   Yes No   Sig: Take 1,000 mg by mouth 2 (two) times a day with meals Take 1/2 tablet in the morning   metoprolol succinate (TOPROL-XL) 100 mg 24 hr tablet  Self Yes No   Sig: Take 100 mg by mouth daily  multivitamin (THERAGRAN) TABS  Self Yes No   Sig: Take 1 tablet by mouth daily  omeprazole (PriLOSEC) 20 mg delayed release capsule  Self No No   Sig: TAKE 1 CAPSULE DAILY      Facility-Administered Medications: None       Past Medical History:   Diagnosis Date    Anemia     Last assessed: 17    Arteriosclerotic cardiovascular disease     Last assessed: 17    Bladder [facility] ([facility] )     Cardiac disease     Diabetes mellitus ([facility] )     Hypertension     Hypotension     Last assessed: 2/24/15    Insomnia     Last assessed: 12    Other seasonal allergic rhinitis     Last assessed: 2/10/16    Transient cerebral ischemia        Past Surgical History:   Procedure Laterality Date    CARDIAC SURGERY      Cath stent placement  Last assessed: 3/9/17  Interventional Catheterization    CHOLECYSTECTOMY      CYSTOSCOPY      Diagnostic w/biopsy  Jazmin Grajeda  Last assessed: 14    CYSTOURETHROSCOPY      w/cautery  Jazmin Grajeda    GASTRIC BYPASS      For morbid obesity w/Shaji-en-Y   Resolved: 09    INCISION AND DRAINAGE OF WOUND Right 2017    Procedure: INCISION AND DRAINAGE (I&D) EXTREMITY WITH APPLICATION OF GRAFT JACKET;  Surgeon: Stoney Hall DPM;  Location: [facility];  Service:     INCISION AND DRAINAGE OF WOUND Right 4/25/2017    Procedure: INCISION AND DRAINAGE (I&D) EXTREMITY, APPLICATION OF GRAFT;  Surgeon: Sol Cr DPM;  Location: [facility];  Service:     JOINT REPLACEMENT      Knee  Last assessed: 1/28/11    HI CYSTOURETHROSCOPY,BIOPSY N/A 8/16/2016    Procedure: Yarelis Fan;  Surgeon: Doug Dunbar MD;  [facility];  Service: Urology    ROTATOR CUFF REPAIR      SMALL INTESTINE SURGERY      Surgery Shaji-en-Y    SPINAL FUSION      VAC DRESSING APPLICATION Right 8/54/4370    Procedure: APPLICATION VAC DRESSING;  Surgeon: Sol Cr DPM;  Location: [facility];  Service:        Family History   Problem Relation Age of Onset    Diabetes Mother     Heart disease Mother     Other Mother         High blood pressure    Heart disease Father     Diabetes Sister     Other Sister         High blood pressure    Kidney disease Sister     Heart disease Brother     Other Brother         High blood pressure     I have reviewed and agree with the history as documented  Social History     Tobacco Use    Smoking status: Former Smoker    Smokeless tobacco: Never Used   Substance Use Topics    Alcohol use: No    Drug use: No        Review of Systems   Constitutional: Negative for chills and fever  HENT: Negative for congestion, drooling, ear discharge, ear pain, facial swelling, rhinorrhea and sore throat  Eyes: Negative for redness  Respiratory: Negative for cough and shortness of breath  Cardiovascular: Negative for chest pain  Gastrointestinal: Negative for abdominal pain, diarrhea, nausea and vomiting  Musculoskeletal: Negative for neck stiffness  Skin: Positive for rash  Allergic/Immunologic: Negative for food allergies  Neurological: Negative for weakness and numbness  Physical Exam  Physical Exam   Constitutional:  Non-toxic appearance  No distress  HENT:   Head: Normocephalic and atraumatic     Right Ear: Tympanic membrane, external ear and ear canal normal  No drainage, swelling or tenderness  No foreign bodies  Tympanic membrane is not erythematous  No hemotympanum  Left Ear: Tympanic membrane, external ear and ear canal normal  No drainage, swelling or tenderness  No foreign bodies  Tympanic membrane is not erythematous  No hemotympanum  Nose: Nose normal    Mouth/Throat: Uvula is midline, oropharynx is clear and moist and mucous membranes are normal  No uvula swelling  No posterior oropharyngeal edema, posterior oropharyngeal erythema or tonsillar abscesses  No tonsillar exudate  Neck: Normal range of motion and phonation normal  Neck supple  No tracheal deviation present  Cardiovascular: Normal rate, regular rhythm, S1 normal, S2 normal and normal heart sounds  Exam reveals no gallop and no friction rub  No murmur heard  Pulmonary/Chest: Effort normal and breath sounds normal  No respiratory distress  He has no decreased breath sounds  He has no wheezes  He has no rhonchi  He has no rales  He exhibits no tenderness  Neurological: He is alert  GCS eye subscore is 4  GCS verbal subscore is 5  GCS motor subscore is 6  Skin: Skin is warm and dry  Rash noted  Vesicular rash seen to left side of head, left neck and left shoulder  Scabbing seen  No erythema, edema or drainage  Rash is tender  Psychiatric: He has a normal mood and affect  Nursing note and vitals reviewed        Vital Signs  ED Triage Vitals [02/17/19 0949]   Temperature Pulse Respirations Blood Pressure SpO2   (!) 97 3 °F (36 3 °C) 74 16 128/71 98 %      Temp Source Heart Rate Source Patient Position - Orthostatic VS BP Location FiO2 (%)   Oral Monitor Sitting Right arm --      Pain Score       7           Vitals:    02/17/19 0949   BP: 128/71   Pulse: 74   Patient Position - Orthostatic VS: Sitting       Visual Acuity      ED Medications  Medications - No data to display    Diagnostic Studies  Results Reviewed     None                 No orders to display Procedures  Procedures       Phone Contacts  ED Phone Contact    ED Course                               MDM  Number of Diagnoses or Management Options  Shingles: new and does not require workup  Diagnosis management comments: DDX consists of but not limited to: shingles, scabies, tinea, dermatitis    At discharge, I instructed the patient to:  -follow up with pcp  -take Naproxen as prescribed for pain and inflammation  -take Acyclovir as prescribed for shingles  -rest and drink plenty of fluids  -return to the ER if symptoms worsened or new symptoms arose  Patient agreed to this plan and was stable at time of discharge  Patient Progress  Patient progress: stable      Disposition  Final diagnoses:   Shingles     Time reflects when diagnosis was documented in both MDM as applicable and the Disposition within this note     Time User Action Codes Description Comment    2/17/2019 10:46 AM Angel CLEMENTS Add [B02 9] Shingles       ED Disposition     ED Disposition Condition Date/Time Comment    Discharge Stable Sun Feb 17, 2019 10:46 AM Kj Zimmer discharge to home/self care  Follow-up Information     Follow up With Specialties Details Why Heron Hernández MD Internal Medicine Schedule an appointment as soon as possible for a visit  As needed [address]   [address] [facility]  [address] [place] [address] [address] [facility]            Discharge Medication List as of 2/17/2019 10:49 AM      START taking these medications    Details   acyclovir (ZOVIRAX) 400 MG tablet Take 2 tablets (800 mg total) by mouth 5 (five) times a day for 7 days, Starting Sun 2/17/2019, Until Sun 2/24/2019, Print      naproxen (NAPROSYN) 500 mg tablet Take 1 tablet (500 mg total) by mouth 2 (two) times a day with meals, Starting Sun 2/17/2019, Print         CONTINUE these medications which have NOT CHANGED    Details   aspirin 81 MG tablet Take 81 mg by mouth daily  , Historical Med      Azelastine HCl 0 15 % SOLN Inhale 1 spray 2 (two) times a day, Starting Mon 1/8/2018, Historical Med      bimatoprost (LUMIGAN) 0 01 % ophthalmic drops 1 drop daily at bedtime  , Historical Med      bismuth subsalicylate (PEPTO BISMOL) 262 MG chewable tablet Chew 524 mg daily as needed for indigestion  , Historical Med      Blood Glucose Monitoring Suppl (ACCU-CHEK RITA PLUS) w/Device KIT USE AS DIRECTED  *PA*, Normal      Cholecalciferol 13482 units capsule Take 50,000 Units by mouth every 30 (thirty) days, Historical Med      fluocinonide (LIDEX) 0 05 % cream Apply topically 2 (two) times a day , Historical Med      fluticasone (FLONASE) 50 mcg/act nasal spray 1 spray into each nostril daily, Starting Thu 9/20/2018, Normal      furosemide (LASIX) 20 mg tablet TAKE 1 TABLET DAILY, Normal      gabapentin (NEURONTIN) 300 mg capsule Take 2 capsules at bedtime, Normal      Insulin Pen Needle 31G X 8 MM MISC BD Ultra-Fine Short Pen Needle 31 gauge x 5/16", Historical Med      isosorbide mononitrate (IMDUR) 30 mg 24 hr tablet Take 1 tablet (30 mg total) by mouth daily for 90 days, Starting Fri 5/18/2018, Until Thu 2/7/2019, Normal      LANTUS SOLOSTAR 100 units/mL injection pen 45 units bedtime, No Print      loperamide (IMODIUM) 2 mg capsule Take by mouth, Starting Thu 10/6/2016, Historical Med      metFORMIN (GLUCOPHAGE) 1000 MG tablet Take 1,000 mg by mouth 2 (two) times a day with meals Take 1/2 tablet in the morning, Historical Med      metoprolol succinate (TOPROL-XL) 100 mg 24 hr tablet Take 100 mg by mouth daily  , Historical Med      Mirabegron ER (MYRBETRIQ) 50 MG TB24 Take by mouth daily, Historical Med      multivitamin (THERAGRAN) TABS Take 1 tablet by mouth daily  , Historical Med      NOVOLOG FLEXPEN 100 units/mL injection pen Inject 15 Units under the skin 3 (three) times a day with meals, Starting Fri 11/30/2018, Normal      omeprazole (PriLOSEC) 20 mg delayed release capsule TAKE 1 CAPSULE DAILY, Normal           No discharge procedures on file     ED Provider  Electronically Signed by           Kerwin Graves PA-C  02/17/19 9757

## 2019-02-17 NOTE — DISCHARGE INSTRUCTIONS
DISCHARGE INSTRUCTIONS:    FOLLOW UP WITH YOUR PRIMARY CARE PROVIDER OR THE [address] [place] [address]  MAKE AN APPOINTMENT TO BE SEEN  TAKE NAPROXEN FOR PAIN AND INFLAMMATION  IF RASH, SHORTNESS OF BREATH OR TROUBLE SWALLOWING, STOP TAKING THE MEDICATION AND BE SEEN  TAKE ACYCLOVIR AS PRESCRIBED  IF RASH, SHORTNESS OF BREATH OR TROUBLE SWALLOWING, STOP TAKING THE MEDICATION AND BE SEEN  REST AND DRINK PLENTY OF FLUIDS  IF SYMPTOMS WORSEN OR NEW SYMPTOMS ARISE, RETURN TO THE ER TO BE SEEN

## 2019-02-22 ENCOUNTER — HOSPITAL ENCOUNTER (OUTPATIENT)
Dept: NON INVASIVE DIAGNOSTICS | Facility: HOSPITAL | Age: 74
Discharge: HOME | End: 2019-02-22
Payer: MEDICARE

## 2019-02-22 ENCOUNTER — OFFICE VISIT (OUTPATIENT)
Dept: INTERNAL MEDICINE CLINIC | Facility: CLINIC | Age: 74
End: 2019-02-22
Payer: MEDICARE

## 2019-02-22 ENCOUNTER — APPOINTMENT (OUTPATIENT)
Dept: LAB | Facility: CLINIC | Age: 74
End: 2019-02-22
Payer: MEDICARE

## 2019-02-22 VITALS
HEIGHT: 76 IN | DIASTOLIC BLOOD PRESSURE: 77 MMHG | SYSTOLIC BLOOD PRESSURE: 136 MMHG | RESPIRATION RATE: 14 BRPM | TEMPERATURE: 97.3 F | BODY MASS INDEX: 29.71 KG/M2 | WEIGHT: 244 LBS | HEART RATE: 88 BPM

## 2019-02-22 DIAGNOSIS — M54.41 CHRONIC MIDLINE LOW BACK PAIN WITH BILATERAL SCIATICA: ICD-10-CM

## 2019-02-22 DIAGNOSIS — I25.10 CORONARY ARTERY DISEASE INVOLVING NATIVE CORONARY ARTERY OF NATIVE HEART WITHOUT ANGINA PECTORIS: ICD-10-CM

## 2019-02-22 DIAGNOSIS — E11.65 UNCONTROLLED TYPE 2 DIABETES MELLITUS WITH HYPERGLYCEMIA (HCC): ICD-10-CM

## 2019-02-22 DIAGNOSIS — M54.42 CHRONIC MIDLINE LOW BACK PAIN WITH BILATERAL SCIATICA: ICD-10-CM

## 2019-02-22 DIAGNOSIS — E11.622 TYPE 2 DIABETES MELLITUS WITH OTHER SKIN ULCER, WITH LONG-TERM CURRENT USE OF INSULIN (HCC): ICD-10-CM

## 2019-02-22 DIAGNOSIS — Z79.4 TYPE 2 DIABETES MELLITUS WITH OTHER SKIN ULCER, WITH LONG-TERM CURRENT USE OF INSULIN (HCC): ICD-10-CM

## 2019-02-22 DIAGNOSIS — M51.36 DEGENERATION OF LUMBAR INTERVERTEBRAL DISC: Primary | ICD-10-CM

## 2019-02-22 DIAGNOSIS — N18.30 CKD (CHRONIC KIDNEY DISEASE) STAGE 3, GFR 30-59 ML/MIN (HCC): ICD-10-CM

## 2019-02-22 DIAGNOSIS — L30.9 ECZEMA, UNSPECIFIED TYPE: ICD-10-CM

## 2019-02-22 DIAGNOSIS — R60.0 LOCALIZED EDEMA: ICD-10-CM

## 2019-02-22 DIAGNOSIS — I70.209 ARTERIOSCLEROSIS OF ARTERY OF EXTREMITY (HCC): ICD-10-CM

## 2019-02-22 DIAGNOSIS — G89.29 CHRONIC MIDLINE LOW BACK PAIN WITH BILATERAL SCIATICA: ICD-10-CM

## 2019-02-22 DIAGNOSIS — I20.8 STABLE ANGINA PECTORIS (HCC): ICD-10-CM

## 2019-02-22 PROBLEM — B02.9 HERPES ZOSTER WITHOUT COMPLICATION: Status: ACTIVE | Noted: 2019-02-22

## 2019-02-22 LAB
ANION GAP SERPL CALCULATED.3IONS-SCNC: 8 MMOL/L (ref 4–13)
BASOPHILS # BLD AUTO: 0.05 THOUSANDS/ΜL (ref 0–0.1)
BASOPHILS NFR BLD AUTO: 1 % (ref 0–1)
BUN SERPL-MCNC: 28 MG/DL (ref 5–25)
CALCIUM SERPL-MCNC: 9 MG/DL (ref 8.3–10.1)
CHLORIDE SERPL-SCNC: 102 MMOL/L (ref 100–108)
CO2 SERPL-SCNC: 26 MMOL/L (ref 21–32)
CREAT SERPL-MCNC: 1.61 MG/DL (ref 0.6–1.3)
CRP SERPL QL: <3 MG/L
DEPRECATED D DIMER PPP: 1689 NG/ML (FEU)
EOSINOPHIL # BLD AUTO: 0.12 THOUSAND/ΜL (ref 0–0.61)
EOSINOPHIL NFR BLD AUTO: 1 % (ref 0–6)
ERYTHROCYTE [DISTWIDTH] IN BLOOD BY AUTOMATED COUNT: 13.1 % (ref 11.6–15.1)
ERYTHROCYTE [SEDIMENTATION RATE] IN BLOOD: 21 MM/HOUR (ref 0–10)
GFR SERPL CREATININE-BSD FRML MDRD: 47 ML/MIN/1.73SQ M
GLUCOSE SERPL-MCNC: 216 MG/DL (ref 65–140)
HCT VFR BLD AUTO: 40.2 % (ref 36.5–49.3)
HGB BLD-MCNC: 13.5 G/DL (ref 12–17)
IMM GRANULOCYTES # BLD AUTO: 0.09 THOUSAND/UL (ref 0–0.2)
IMM GRANULOCYTES NFR BLD AUTO: 1 % (ref 0–2)
LYMPHOCYTES # BLD AUTO: 2.7 THOUSANDS/ΜL (ref 0.6–4.47)
LYMPHOCYTES NFR BLD AUTO: 30 % (ref 14–44)
MAGNESIUM SERPL-MCNC: 2.3 MG/DL (ref 1.6–2.6)
MCH RBC QN AUTO: 31.5 PG (ref 26.8–34.3)
MCHC RBC AUTO-ENTMCNC: 33.6 G/DL (ref 31.4–37.4)
MCV RBC AUTO: 94 FL (ref 82–98)
MONOCYTES # BLD AUTO: 0.61 THOUSAND/ΜL (ref 0.17–1.22)
MONOCYTES NFR BLD AUTO: 7 % (ref 4–12)
NEUTROPHILS # BLD AUTO: 5.51 THOUSANDS/ΜL (ref 1.85–7.62)
NEUTS SEG NFR BLD AUTO: 60 % (ref 43–75)
NRBC BLD AUTO-RTO: 0 /100 WBCS
PLATELET # BLD AUTO: 180 THOUSANDS/UL (ref 149–390)
PMV BLD AUTO: 10.9 FL (ref 8.9–12.7)
POTASSIUM SERPL-SCNC: 4.6 MMOL/L (ref 3.5–5.3)
RBC # BLD AUTO: 4.29 MILLION/UL (ref 3.88–5.62)
SODIUM SERPL-SCNC: 136 MMOL/L (ref 136–145)
WBC # BLD AUTO: 9.08 THOUSAND/UL (ref 4.31–10.16)

## 2019-02-22 PROCEDURE — 93971 EXTREMITY STUDY: CPT

## 2019-02-22 PROCEDURE — 85652 RBC SED RATE AUTOMATED: CPT

## 2019-02-22 PROCEDURE — 80048 BASIC METABOLIC PNL TOTAL CA: CPT

## 2019-02-22 PROCEDURE — 85379 FIBRIN DEGRADATION QUANT: CPT

## 2019-02-22 PROCEDURE — 85025 COMPLETE CBC W/AUTO DIFF WBC: CPT

## 2019-02-22 PROCEDURE — 36415 COLL VENOUS BLD VENIPUNCTURE: CPT

## 2019-02-22 PROCEDURE — 83735 ASSAY OF MAGNESIUM: CPT

## 2019-02-22 PROCEDURE — 93971 EXTREMITY STUDY: CPT | Performed by: SURGERY

## 2019-02-22 PROCEDURE — 99214 OFFICE O/P EST MOD 30 MIN: CPT | Performed by: INTERNAL MEDICINE

## 2019-02-22 PROCEDURE — 86140 C-REACTIVE PROTEIN: CPT

## 2019-02-22 RX ORDER — TRIAMCINOLONE ACETONIDE 5 MG/G
OINTMENT TOPICAL 2 TIMES DAILY
Qty: 30 G | Refills: 0 | Status: SHIPPED | OUTPATIENT
Start: 2019-02-22 | End: 2019-10-10 | Stop reason: ALTCHOICE

## 2019-02-22 NOTE — PATIENT INSTRUCTIONS
Go for your venous duplex  Continue acyclovir  Use your ointment for your right arm  Will see her back in March

## 2019-02-23 DIAGNOSIS — E11.8 TYPE 2 DIABETES MELLITUS WITH COMPLICATION, UNSPECIFIED WHETHER LONG TERM INSULIN USE: ICD-10-CM

## 2019-02-23 DIAGNOSIS — I25.10 CORONARY ARTERY DISEASE INVOLVING NATIVE HEART WITHOUT ANGINA PECTORIS, UNSPECIFIED VESSEL OR LESION TYPE: ICD-10-CM

## 2019-02-24 RX ORDER — ISOSORBIDE MONONITRATE 30 MG/1
TABLET, EXTENDED RELEASE ORAL
Qty: 90 TABLET | Refills: 1 | Status: SHIPPED | OUTPATIENT
Start: 2019-02-24 | End: 2019-07-31 | Stop reason: SDUPTHER

## 2019-02-25 ENCOUNTER — TELEPHONE (OUTPATIENT)
Dept: INTERNAL MEDICINE CLINIC | Facility: CLINIC | Age: 74
End: 2019-02-25

## 2019-02-25 DIAGNOSIS — R60.0 LOCALIZED EDEMA: Primary | ICD-10-CM

## 2019-02-25 NOTE — TELEPHONE ENCOUNTER
Spoke to the patient and reviewed the results of the Venous Duplex  We will schedule a follow up Venous Duplex in 7-10 days of the left leg    The patient understood

## 2019-02-25 NOTE — TELEPHONE ENCOUNTER
----- Message from Adam Patton MD sent at 2/22/2019  3:11 PM EST -----  Please call the patient regarding his abnormal result  Venous duplex showed no evidence of deep vein thrombosis in the right or left leg the left leg with the swelling was he has superficial vein thrombosis we usually do not treat this with anticoagulation he has pain he can take Tylenol use support stockings and elevate the leg but we like to repeat a venous duplex in 7-10 days to make sure there is no progressing to the deep veins    So schedule at save the note for office visit review

## 2019-02-26 RX ORDER — INSULIN ASPART 100 [IU]/ML
INJECTION, SOLUTION INTRAVENOUS; SUBCUTANEOUS
Qty: 15 ML | Refills: 0 | Status: SHIPPED | OUTPATIENT
Start: 2019-02-26 | End: 2019-03-29 | Stop reason: SDUPTHER

## 2019-02-27 DIAGNOSIS — E11.8 TYPE 2 DIABETES MELLITUS WITH COMPLICATION, UNSPECIFIED WHETHER LONG TERM INSULIN USE: ICD-10-CM

## 2019-02-27 RX ORDER — INSULIN ASPART 100 [IU]/ML
15 INJECTION, SOLUTION INTRAVENOUS; SUBCUTANEOUS
Qty: 10 PEN | Refills: 2 | Status: SHIPPED | OUTPATIENT
Start: 2019-02-27 | End: 2019-03-29 | Stop reason: SDUPTHER

## 2019-03-01 ENCOUNTER — HOSPITAL ENCOUNTER (OUTPATIENT)
Dept: NON INVASIVE DIAGNOSTICS | Facility: HOSPITAL | Age: 74
Discharge: HOME | End: 2019-03-01
Payer: MEDICARE

## 2019-03-01 DIAGNOSIS — R60.0 LOCALIZED EDEMA: ICD-10-CM

## 2019-03-01 PROCEDURE — 93971 EXTREMITY STUDY: CPT

## 2019-03-02 PROCEDURE — 93971 EXTREMITY STUDY: CPT | Performed by: SURGERY

## 2019-03-04 DIAGNOSIS — E11.51 TYPE 2 DIABETES MELLITUS WITH DIABETIC PERIPHERAL ANGIOPATHY WITHOUT GANGRENE, WITH LONG-TERM CURRENT USE OF INSULIN (HCC): Primary | ICD-10-CM

## 2019-03-04 DIAGNOSIS — Z79.4 TYPE 2 DIABETES MELLITUS WITH DIABETIC PERIPHERAL ANGIOPATHY WITHOUT GANGRENE, WITH LONG-TERM CURRENT USE OF INSULIN (HCC): Primary | ICD-10-CM

## 2019-03-07 ENCOUNTER — OFFICE VISIT (OUTPATIENT)
Dept: INTERNAL MEDICINE CLINIC | Facility: CLINIC | Age: 74
End: 2019-03-07
Payer: MEDICARE

## 2019-03-07 VITALS
SYSTOLIC BLOOD PRESSURE: 124 MMHG | HEIGHT: 76 IN | DIASTOLIC BLOOD PRESSURE: 76 MMHG | HEART RATE: 77 BPM | RESPIRATION RATE: 14 BRPM | WEIGHT: 245 LBS | TEMPERATURE: 97.2 F | BODY MASS INDEX: 29.83 KG/M2

## 2019-03-07 DIAGNOSIS — I25.10 CORONARY ARTERY DISEASE INVOLVING NATIVE CORONARY ARTERY OF NATIVE HEART WITHOUT ANGINA PECTORIS: ICD-10-CM

## 2019-03-07 DIAGNOSIS — D09.0 CARCINOMA IN SITU OF BLADDER: ICD-10-CM

## 2019-03-07 DIAGNOSIS — E11.51 TYPE 2 DIABETES MELLITUS WITH DIABETIC PERIPHERAL ANGIOPATHY WITHOUT GANGRENE, WITH LONG-TERM CURRENT USE OF INSULIN (HCC): Primary | ICD-10-CM

## 2019-03-07 DIAGNOSIS — B02.9 HERPES ZOSTER WITHOUT COMPLICATION: ICD-10-CM

## 2019-03-07 DIAGNOSIS — I10 ESSENTIAL HYPERTENSION: ICD-10-CM

## 2019-03-07 DIAGNOSIS — Z79.4 TYPE 2 DIABETES MELLITUS WITH DIABETIC PERIPHERAL ANGIOPATHY WITHOUT GANGRENE, WITH LONG-TERM CURRENT USE OF INSULIN (HCC): Primary | ICD-10-CM

## 2019-03-07 DIAGNOSIS — I80.9 SUPERFICIAL PHLEBITIS: ICD-10-CM

## 2019-03-07 PROCEDURE — 99214 OFFICE O/P EST MOD 30 MIN: CPT | Performed by: INTERNAL MEDICINE

## 2019-03-07 NOTE — PROGRESS NOTES
Assessment/Plan:    Superficial phlebitis  He had 2 venous duplex is which showed no propagation of the blood clot  At the present time continue on aspirin will repeat the venous duplex in 4 weeks  With a D-dimer lab work he feels fine the swelling went down in the leg he is using the compression stockings  CAD (coronary artery disease)  No angina blood pressure control    Essential hypertension  Blood pressure control no change in medications    Herpes zoster without complication  He has crusted lesion in the scalp and neck which are healing quite well he has no pain he went through a course of Valtrex no further treatment necessary in the future in about a year so will recommend he has shingles vaccine       Diagnoses and all orders for this visit:    Type 2 diabetes mellitus with diabetic peripheral angiopathy without gangrene, with long-term current use of insulin ([facility])  -     D-dimer, quantitative; Future  -     CBC; Future  -     Sedimentation rate, automated; Future  -     C-reactive protein; Future  -     Magnesium; Future  -     Basic metabolic panel; Future    Coronary artery disease involving native coronary artery of native heart without angina pectoris  -     D-dimer, quantitative; Future  -     CBC; Future  -     Sedimentation rate, automated; Future  -     C-reactive protein; Future  -     Magnesium; Future  -     Basic metabolic panel; Future    Essential hypertension  -     D-dimer, quantitative; Future  -     CBC; Future  -     Sedimentation rate, automated; Future  -     C-reactive protein; Future  -     Magnesium; Future  -     Basic metabolic panel; Future    Carcinoma in situ of bladder  -     VAS lower limb venous duplex study, complete bilateral; Future    Herpes zoster without complication    Superficial phlebitis  -     VAS lower limb venous duplex study, complete bilateral; Future  -     D-dimer, quantitative; Future  -     CBC; Future  -     Sedimentation rate, automated;  Future  - C-reactive protein; Future  -     Magnesium; Future  -     Basic metabolic panel; Future          Subjective:      Patient ID: Lizbet Whitehead is a 66 y o  male  Chief Complaint   Patient presents with    Follow-up         Current Outpatient Medications:     aspirin 81 MG tablet, Take 81 mg by mouth daily  , Disp: , Rfl:     Azelastine HCl 0 15 % SOLN, Inhale 1 spray 2 (two) times a day, Disp: , Rfl: 0    bimatoprost (LUMIGAN) 0 01 % ophthalmic drops, 1 drop daily at bedtime  , Disp: , Rfl:     bismuth subsalicylate (PEPTO BISMOL) 262 MG chewable tablet, Chew 524 mg daily as needed for indigestion  , Disp: , Rfl:     Blood Glucose Monitoring Suppl (ACCU-CHEK RITA PLUS) w/Device KIT, USE AS DIRECTED  *PA*, Disp: 1 kit, Rfl: 0    Cholecalciferol 71540 units capsule, Take 50,000 Units by mouth every 30 (thirty) days, Disp: , Rfl:     fluocinonide (LIDEX) 0 05 % cream, Apply topically 2 (two) times a day , Disp: , Rfl:     fluticasone (FLONASE) 50 mcg/act nasal spray, 1 spray into each nostril daily, Disp: 16 g, Rfl: 3    furosemide (LASIX) 20 mg tablet, TAKE 1 TABLET DAILY, Disp: 90 tablet, Rfl: 1    gabapentin (NEURONTIN) 300 mg capsule, Take 2 capsules at bedtime, Disp: 180 capsule, Rfl: 1    Insulin Pen Needle 31G X 8 MM MISC, Inject 3 times a day, Disp: 100 each, Rfl: 1    isosorbide mononitrate (IMDUR) 30 mg 24 hr tablet, TAKE 1 TABLET DAILY, Disp: 90 tablet, Rfl: 1    LANTUS SOLOSTAR 100 units/mL injection pen, 45 units bedtime, Disp: 5 pen, Rfl: 0    loperamide (IMODIUM) 2 mg capsule, Take by mouth, Disp: , Rfl:     metFORMIN (GLUCOPHAGE) 1000 MG tablet, Take 1,000 mg by mouth 2 (two) times a day with meals Take 1/2 tablet in the morning, Disp: , Rfl:     metoprolol succinate (TOPROL-XL) 100 mg 24 hr tablet, Take 100 mg by mouth daily  , Disp: , Rfl:     Mirabegron ER (MYRBETRIQ) 50 MG TB24, Take by mouth daily, Disp: , Rfl:     multivitamin (THERAGRAN) TABS, Take 1 tablet by mouth daily  , Disp: , Rfl:     naproxen (NAPROSYN) 500 mg tablet, Take 1 tablet (500 mg total) by mouth 2 (two) times a day with meals, Disp: 30 tablet, Rfl: 0    NOVOLOG FLEXPEN 100 units/mL injection pen, INJECT 15 UNITS            SUBCUTANEOUSLY 3 TIMES A   DAY WITH MEALS, Disp: 15 mL, Rfl: 0    NOVOLOG FLEXPEN 100 units/mL injection pen, INJECT 15 UNITS UNDER THE SKIN 3 (THREE) TIMES A DAY WITH MEALS, Disp: 10 pen, Rfl: 2    omeprazole (PriLOSEC) 20 mg delayed release capsule, TAKE 1 CAPSULE DAILY, Disp: 90 capsule, Rfl: 1    triamcinolone (KENALOG) 0 5 % ointment, Apply topically 2 (two) times a day, Disp: 30 g, Rfl: 0    acyclovir (ZOVIRAX) 400 MG tablet, Take 2 tablets (800 mg total) by mouth 5 (five) times a day for 7 days, Disp: 70 tablet, Rfl: 0    Superficial phlebitis to venous duplex is showed no probation at the present time continue on an aspirin  Will repeated in 4 weeks with a D-dimer in view of his history of bladder cancer in the past   He is at risk for DVT I explained that to the patient if he develops chest pain shortness of breath increasing swelling in the left leg to let us know right away I will send for a stat venous duplex    Herpes zoster recuperating quite well no pain the rash is crusted in the scalp on the left side and neck area    Coronary artery disease stable no angina    Back pain ambulates with a walker he is going to ask what therapy is being followed by back specialist in [place]  The following portions of the patient's history were reviewed and updated as appropriate: allergies, current medications, past family history, past medical history, past social history, past surgical history and problem list     Review of Systems   Constitutional: Negative  Negative for activity change, appetite change, fatigue, fever and unexpected weight change     HENT: Negative for congestion, ear pain, hearing loss, mouth sores, postnasal drip, rhinorrhea, sore throat, trouble swallowing and voice change  Eyes: Negative for pain, redness and visual disturbance  Respiratory: Negative for cough, chest tightness, shortness of breath and wheezing  Cardiovascular: Negative for chest pain, palpitations and leg swelling  Gastrointestinal: Negative for abdominal distention, abdominal pain, blood in stool, constipation, diarrhea and nausea  Endocrine: Negative for cold intolerance, heat intolerance, polydipsia, polyphagia and polyuria  Genitourinary: Negative for difficulty urinating, dysuria, flank pain, frequency, hematuria and urgency  Musculoskeletal: Negative for arthralgias, back pain, gait problem, joint swelling and myalgias  Skin: Negative for color change and pallor  Herpes zoster rash in the scalp and neck area resolving   Neurological: Negative for dizziness, tremors, seizures, syncope, weakness, numbness and headaches  Hematological: Negative for adenopathy  Does not bruise/bleed easily  Psychiatric/Behavioral: Negative  Negative for sleep disturbance  The patient is not nervous/anxious            Objective:    Results for orders placed or performed in visit on 02/22/19   CBC and differential   Result Value Ref Range    WBC 9 08 4 31 - 10 16 Thousand/uL    RBC 4 29 3 88 - 5 62 Million/uL    Hemoglobin 13 5 12 0 - 17 0 g/dL    Hematocrit 40 2 36 5 - 49 3 %    MCV 94 82 - 98 fL    MCH 31 5 26 8 - 34 3 pg    MCHC 33 6 31 4 - 37 4 g/dL    RDW 13 1 11 6 - 15 1 %    MPV 10 9 8 9 - 12 7 fL    Platelets 872 328 - 728 Thousands/uL    nRBC 0 /100 WBCs    Neutrophils Relative 60 43 - 75 %    Immat GRANS % 1 0 - 2 %    Lymphocytes Relative 30 14 - 44 %    Monocytes Relative 7 4 - 12 %    Eosinophils Relative 1 0 - 6 %    Basophils Relative 1 0 - 1 %    Neutrophils Absolute 5 51 1 85 - 7 62 Thousands/µL    Immature Grans Absolute 0 09 0 00 - 0 20 Thousand/uL    Lymphocytes Absolute 2 70 0 60 - 4 47 Thousands/µL    Monocytes Absolute 0 61 0 17 - 1 22 Thousand/µL    Eosinophils Absolute 0 12 0 00 - 0 61 Thousand/µL    Basophils Absolute 0 05 0 00 - 0 10 Thousands/µL   Basic metabolic panel   Result Value Ref Range    Sodium 136 136 - 145 mmol/L    Potassium 4 6 3 5 - 5 3 mmol/L    Chloride 102 100 - 108 mmol/L    CO2 26 21 - 32 mmol/L    ANION GAP 8 4 - 13 mmol/L    BUN 28 (H) 5 - 25 mg/dL    Creatinine 1 61 (H) 0 60 - 1 30 mg/dL    Glucose 216 (H) 65 - 140 mg/dL    Calcium 9 0 8 3 - 10 1 mg/dL    eGFR 47 ml/min/1 73sq m   Magnesium   Result Value Ref Range    Magnesium 2 3 1 6 - 2 6 mg/dL   Sedimentation rate, automated   Result Value Ref Range    Sed Rate 21 (H) 0 - 10 mm/hour   C-reactive protein   Result Value Ref Range    CRP <3 0 <3 0 mg/L   D-dimer, quantitative   Result Value Ref Range    D-Dimer, Quant 1,689 (H) <500 ng/ml (FEU)       /76 (BP Location: Left arm, Patient Position: Sitting, Cuff Size: Large)   Pulse 77   Temp (!) 97 2 °F (36 2 °C)   Resp 14   Ht 6' 4" (1 93 m)   Wt 111 kg (245 lb)   BMI 29 82 kg/m²      Physical Exam   Constitutional: He is oriented to person, place, and time  He appears well-developed and well-nourished  HENT:   Head: Normocephalic  Right Ear: External ear normal    Left Ear: External ear normal    Nose: Nose normal    Mouth/Throat: Oropharynx is clear and moist  No oropharyngeal exudate  Eyes: Pupils are equal, round, and reactive to light  Conjunctivae and EOM are normal    Neck: Normal range of motion  Neck supple  No thyromegaly present  Cardiovascular: Normal rate, regular rhythm, normal heart sounds and intact distal pulses  Exam reveals no gallop and no friction rub  No murmur heard  S1-S2 regular rhythm  Extremities no edema or calf tenderness no edema in the left leg  Pulmonary/Chest: Effort normal and breath sounds normal  No respiratory distress  He has no wheezes  He has no rales  Lungs are clear no wheezing rales or rhonchi   Abdominal: Soft  Bowel sounds are normal  He exhibits no distension and no mass  There is no tenderness  There is no rebound and no guarding  Musculoskeletal: Normal range of motion  Lymphadenopathy:     He has no cervical adenopathy  Neurological: He is alert and oriented to person, place, and time  Skin: Skin is warm and dry  Vesicular rash in the scalp and neck on the left side healing quite well just scabbed lesions  No pain no evidence of infection   Psychiatric: He has a normal mood and affect  His behavior is normal  Judgment normal    Nursing note and vitals reviewed

## 2019-03-07 NOTE — PATIENT INSTRUCTIONS
Your herpes zoster is resolving  You has superficial phlebitis of you're left leg no need for treatment at this time will follow it  Will repeated venous duplex in 4 weeks with lab work I will see her in the office after

## 2019-03-07 NOTE — ASSESSMENT & PLAN NOTE
He had 2 venous duplex is which showed no propagation of the blood clot  At the present time continue on aspirin will repeat the venous duplex in 4 weeks  With a D-dimer lab work he feels fine the swelling went down in the leg he is using the compression stockings

## 2019-03-07 NOTE — ASSESSMENT & PLAN NOTE
He has crusted lesion in the scalp and neck which are healing quite well he has no pain he went through a course of Valtrex no further treatment necessary in the future in about a year so will recommend he has shingles vaccine

## 2019-03-12 ENCOUNTER — APPOINTMENT (OUTPATIENT)
Dept: LAB | Facility: CLINIC | Age: 74
End: 2019-03-12
Payer: MEDICARE

## 2019-03-12 DIAGNOSIS — Z79.4 TYPE 2 DIABETES MELLITUS WITH STAGE 3 CHRONIC KIDNEY DISEASE, WITH LONG-TERM CURRENT USE OF INSULIN (HCC): ICD-10-CM

## 2019-03-12 DIAGNOSIS — N18.30 TYPE 2 DIABETES MELLITUS WITH STAGE 3 CHRONIC KIDNEY DISEASE, WITH LONG-TERM CURRENT USE OF INSULIN (HCC): ICD-10-CM

## 2019-03-12 DIAGNOSIS — E11.22 TYPE 2 DIABETES MELLITUS WITH STAGE 3 CHRONIC KIDNEY DISEASE, WITH LONG-TERM CURRENT USE OF INSULIN (HCC): ICD-10-CM

## 2019-03-12 LAB
ALBUMIN SERPL BCP-MCNC: 3.6 G/DL (ref 3.5–5)
ALP SERPL-CCNC: 88 U/L (ref 46–116)
ALT SERPL W P-5'-P-CCNC: 62 U/L (ref 12–78)
ANION GAP SERPL CALCULATED.3IONS-SCNC: 5 MMOL/L (ref 4–13)
AST SERPL W P-5'-P-CCNC: 35 U/L (ref 5–45)
BILIRUB SERPL-MCNC: 0.58 MG/DL (ref 0.2–1)
BUN SERPL-MCNC: 20 MG/DL (ref 5–25)
CALCIUM SERPL-MCNC: 8.9 MG/DL (ref 8.3–10.1)
CHLORIDE SERPL-SCNC: 104 MMOL/L (ref 100–108)
CO2 SERPL-SCNC: 30 MMOL/L (ref 21–32)
CREAT SERPL-MCNC: 1.37 MG/DL (ref 0.6–1.3)
EST. AVERAGE GLUCOSE BLD GHB EST-MCNC: 243 MG/DL
GFR SERPL CREATININE-BSD FRML MDRD: 58 ML/MIN/1.73SQ M
GLUCOSE P FAST SERPL-MCNC: 129 MG/DL (ref 65–99)
HBA1C MFR BLD: 10.1 % (ref 4.2–6.3)
POTASSIUM SERPL-SCNC: 4.3 MMOL/L (ref 3.5–5.3)
PROT SERPL-MCNC: 7 G/DL (ref 6.4–8.2)
SODIUM SERPL-SCNC: 139 MMOL/L (ref 136–145)

## 2019-03-12 PROCEDURE — 36415 COLL VENOUS BLD VENIPUNCTURE: CPT

## 2019-03-12 PROCEDURE — 83036 HEMOGLOBIN GLYCOSYLATED A1C: CPT

## 2019-03-12 PROCEDURE — 80053 COMPREHEN METABOLIC PANEL: CPT

## 2019-03-26 ENCOUNTER — TELEPHONE (OUTPATIENT)
Dept: INTERNAL MEDICINE CLINIC | Facility: CLINIC | Age: 74
End: 2019-03-26

## 2019-03-26 DIAGNOSIS — N39.0 URINARY TRACT INFECTION WITHOUT HEMATURIA, SITE UNSPECIFIED: Primary | ICD-10-CM

## 2019-03-26 RX ORDER — CIPROFLOXACIN 500 MG/1
500 TABLET, FILM COATED ORAL EVERY 12 HOURS SCHEDULED
Qty: 10 TABLET | Refills: 0 | Status: SHIPPED | OUTPATIENT
Start: 2019-03-26 | End: 2019-03-31

## 2019-03-26 NOTE — TELEPHONE ENCOUNTER
C/o burning with voiding and frequency for a few days,   Instructed to take Cipro 500 mg BID for 5 days, Rx sent to pharmacy

## 2019-03-29 ENCOUNTER — OFFICE VISIT (OUTPATIENT)
Dept: ENDOCRINOLOGY | Facility: HOSPITAL | Age: 74
End: 2019-03-29
Payer: MEDICARE

## 2019-03-29 VITALS
WEIGHT: 246.2 LBS | HEIGHT: 76 IN | DIASTOLIC BLOOD PRESSURE: 68 MMHG | BODY MASS INDEX: 29.98 KG/M2 | HEART RATE: 96 BPM | SYSTOLIC BLOOD PRESSURE: 166 MMHG

## 2019-03-29 DIAGNOSIS — I10 ESSENTIAL HYPERTENSION: ICD-10-CM

## 2019-03-29 DIAGNOSIS — Z79.4 TYPE 2 DIABETES MELLITUS WITH DIABETIC PERIPHERAL ANGIOPATHY WITHOUT GANGRENE, WITH LONG-TERM CURRENT USE OF INSULIN (HCC): Primary | ICD-10-CM

## 2019-03-29 DIAGNOSIS — E11.51 TYPE 2 DIABETES MELLITUS WITH DIABETIC PERIPHERAL ANGIOPATHY WITHOUT GANGRENE, WITH LONG-TERM CURRENT USE OF INSULIN (HCC): Primary | ICD-10-CM

## 2019-03-29 DIAGNOSIS — E11.8 TYPE 2 DIABETES MELLITUS WITH COMPLICATION, UNSPECIFIED WHETHER LONG TERM INSULIN USE: ICD-10-CM

## 2019-03-29 DIAGNOSIS — E11.8 TYPE 2 DIABETES MELLITUS WITH COMPLICATION, WITH LONG-TERM CURRENT USE OF INSULIN (HCC): ICD-10-CM

## 2019-03-29 DIAGNOSIS — E78.5 HYPERLIPIDEMIA, UNSPECIFIED HYPERLIPIDEMIA TYPE: ICD-10-CM

## 2019-03-29 DIAGNOSIS — Z79.4 TYPE 2 DIABETES MELLITUS WITH COMPLICATION, WITH LONG-TERM CURRENT USE OF INSULIN (HCC): ICD-10-CM

## 2019-03-29 PROCEDURE — 99214 OFFICE O/P EST MOD 30 MIN: CPT | Performed by: INTERNAL MEDICINE

## 2019-03-29 RX ORDER — INSULIN ASPART 100 [IU]/ML
INJECTION, SOLUTION INTRAVENOUS; SUBCUTANEOUS
Qty: 15 PEN | Refills: 3 | Status: SHIPPED | OUTPATIENT
Start: 2019-03-29 | End: 2019-06-27 | Stop reason: SDUPTHER

## 2019-03-29 RX ORDER — INSULIN ASPART 100 [IU]/ML
INJECTION, SOLUTION INTRAVENOUS; SUBCUTANEOUS
Qty: 15 PEN | Refills: 3 | Status: SHIPPED | OUTPATIENT
Start: 2019-03-29 | End: 2019-03-29 | Stop reason: SDUPTHER

## 2019-03-29 RX ORDER — INSULIN GLARGINE 100 [IU]/ML
INJECTION, SOLUTION SUBCUTANEOUS
Qty: 15 PEN | Refills: 3 | Status: SHIPPED | OUTPATIENT
Start: 2019-03-29 | End: 2019-05-14 | Stop reason: SDUPTHER

## 2019-03-29 RX ORDER — INSULIN GLARGINE 100 [IU]/ML
INJECTION, SOLUTION SUBCUTANEOUS
Qty: 15 PEN | Refills: 3 | Status: SHIPPED | OUTPATIENT
Start: 2019-03-29 | End: 2019-03-29 | Stop reason: SDUPTHER

## 2019-03-29 NOTE — PROGRESS NOTES
3/29/2019    Assessment/Plan      Diagnoses and all orders for this visit:    Type 2 diabetes mellitus with diabetic peripheral angiopathy without gangrene, with long-term current use of insulin ([facility])  -     HEMOGLOBIN A1C W/ EAG ESTIMATION Lab Collect; Future  -     Comprehensive metabolic panel Lab Collect; Future  -     Microalbumin / creatinine urine ratio- Lab Collect; Future  -     TSH, 3rd generation Lab Collect; Future  -     CBC and differential- Lab Collect; Future    Essential hypertension    Hyperlipidemia, unspecified hyperlipidemia type  -     Lipid Panel with Direct LDL reflex Lab Collect; Future    Type 2 diabetes mellitus with complication, with long-term current use of insulin ([facility])  -     Discontinue: LANTUS SOLOSTAR 100 units/mL injection pen; 52 units qhs  -     LANTUS SOLOSTAR 100 units/mL injection pen; 52 units qhs    Type 2 diabetes mellitus with complication, unspecified whether long term insulin use ([facility])  -     Discontinue: NOVOLOG FLEXPEN 100 units/mL injection pen; Up to 50 units daily   -     NOVOLOG FLEXPEN 100 units/mL injection pen; Up to 50 units daily  Assessment/Plan:  1  Type 2 diabetes with long-term insulin use and peripheral neuropathy:  Uncontrolled based on A1c  Fasting blood sugars are above goal   Will increase Lantus to 52 units at bedtime  Asked him to send in blood sugars on a blood sugar log sheet provided about 2 weeks checking twice a day at varying times  We will call him back and adjust his insulin doses at that time  He may be undergoing surgery at [facility] for back surgery so I encouraged him to send in blood sugars every 2 weeks so we can optimize his blood sugar control prior to a possible operation  Follow-up in 3 or 4 months with labs as ordered above just prior  2  Hypertension:  Continue current regimen  3  Hyperlipidemia:  Check lipids before next appointment    Has been intolerant to multiple statins in the past       CC: Diabetes Consult    History of Present Illness     HPI: Lacy Mendiola is a 66y o  year old male with type 2 diabetes for 25 years  He is on oral agents and insulin at home and takes NovoLog 15 units with each meal, metformin 1000 mg twice a day, Lantus 45 units at bedtime  He denies any polyuria, polydipsia, nocturia and blurry vision  Diabetes is complicated by peripheral neuropathy and history of foot infection back in [id number]  Hypoglycemic episodes: No      The patient's last eye exam was in October 2018  The patient's last foot exam was in July 2018  Blood Sugar/Glucometer/Pump/CGM review:  Fasting blood sugars have been running above goal and frequently 160-170  For hypertension he is maintained on metoprolol 100 mg daily of the XL  In the past he has had intolerance and side effects to multiple statins  He does follow with his PCP and cardiologist closely  Review of Systems   Constitutional: Negative for fatigue  HENT: Negative for trouble swallowing and voice change  Eyes: Negative for visual disturbance  Respiratory: Negative for shortness of breath  Cardiovascular: Negative for palpitations and leg swelling  Gastrointestinal: Negative for abdominal pain, nausea and vomiting  Endocrine: Negative for polydipsia and polyuria  Musculoskeletal: Negative for arthralgias and myalgias  Skin: Negative for rash  Neurological: Negative for dizziness, tremors and weakness  Hematological: Negative for adenopathy  Psychiatric/Behavioral: Negative for agitation and confusion         Historical Information   Past Medical History:   Diagnosis Date    Anemia     Last assessed: 9/28/17    Arteriosclerotic cardiovascular disease     Last assessed: 9/28/17    Bladder cancer ([facility] )     Cardiac disease     Diabetes mellitus ([facility] )     Hypertension     Hypotension     Last assessed: 2/24/15    Insomnia     Last assessed: 11/14/12    Other seasonal allergic rhinitis     Last assessed: 2/10/16    Transient cerebral ischemia      Past Surgical History:   Procedure Laterality Date    CARDIAC SURGERY      Cath stent placement  Last assessed: 3/9/17  Interventional Catheterization    CHOLECYSTECTOMY      CYSTOSCOPY      Diagnostic w/biopsy  Trice Torrez  Last assessed: 12/1/14    CYSTOURETHROSCOPY      w/cautery  Trice Torrez    GASTRIC BYPASS      For morbid obesity w/Shaji-en-Y  Resolved: 11/17/09    INCISION AND DRAINAGE OF WOUND Right 2/26/2017    Procedure: INCISION AND DRAINAGE (I&D) EXTREMITY WITH APPLICATION OF GRAFT JACKET;  Surgeon: Luis Fernando Christie DPM;  Location: [facility];  Service:     INCISION AND DRAINAGE OF WOUND Right 4/25/2017    Procedure: INCISION AND DRAINAGE (I&D) EXTREMITY, APPLICATION OF GRAFT;  Surgeon: Luis Fernando Christie DPM;  Location[facility];  Service:     JOINT REPLACEMENT      Knee   Last assessed: 1/28/11    AR CYSTOURETHROSCOPY,BIOPSY N/A 8/16/2016    Procedure: CYSTOSCOPY WITH BIOPSIES;  Surgeon: Gino Malone MD;  [facility];  Service: Urology    ROTATOR CUFF REPAIR      SMALL INTESTINE SURGERY      Surgery Shaji-en-Y    SPINAL FUSION      VAC DRESSING APPLICATION Right 1/89/0281    Procedure: APPLICATION VAC DRESSING;  Surgeon: Luis Fernando Christie DPM;  Location[facility];  Service:      Social History   Social History     Substance and Sexual Activity   Alcohol Use No     Social History     Substance and Sexual Activity   Drug Use No     Social History     Tobacco Use   Smoking Status Former Smoker   Smokeless Tobacco Never Used     Family History:   Family History   Problem Relation Age of Onset    Diabetes Mother     Heart disease Mother     Other Mother         High blood pressure    Heart disease Father     Diabetes Sister     Other Sister         High blood pressure    Kidney disease Sister     Heart disease Brother     Other Brother         High blood pressure       Meds/Allergies   Current Outpatient Medications   Medication Sig Dispense Refill    aspirin 81 MG tablet Take 81 mg by mouth daily   Azelastine HCl 0 15 % SOLN Inhale 1 spray 2 (two) times a day  0    bimatoprost (LUMIGAN) 0 01 % ophthalmic drops 1 drop daily at bedtime   bismuth subsalicylate (PEPTO BISMOL) 262 MG chewable tablet Chew 524 mg daily as needed for indigestion   Blood Glucose Monitoring Suppl (ACCU-CHEK RITA PLUS) w/Device KIT USE AS DIRECTED  *PA* 1 kit 0    Cholecalciferol 90610 units capsule Take 50,000 Units by mouth every 30 (thirty) days      ciprofloxacin (CIPRO) 500 mg tablet Take 1 tablet (500 mg total) by mouth every 12 (twelve) hours for 5 days 10 tablet 0    fluocinonide (LIDEX) 0 05 % cream Apply topically 2 (two) times a day   fluticasone (FLONASE) 50 mcg/act nasal spray 1 spray into each nostril daily 16 g 3    furosemide (LASIX) 20 mg tablet TAKE 1 TABLET DAILY 90 tablet 1    gabapentin (NEURONTIN) 300 mg capsule Take 2 capsules at bedtime 180 capsule 1    Insulin Pen Needle 31G X 8 MM MISC Inject 3 times a day 100 each 1    isosorbide mononitrate (IMDUR) 30 mg 24 hr tablet TAKE 1 TABLET DAILY 90 tablet 1    LANTUS SOLOSTAR 100 units/mL injection pen 52 units qhs 15 pen 3    loperamide (IMODIUM) 2 mg capsule Take by mouth      metFORMIN (GLUCOPHAGE) 1000 MG tablet Take 1/2 tablet in the morning       metoprolol succinate (TOPROL-XL) 100 mg 24 hr tablet Take 100 mg by mouth daily   Mirabegron ER (MYRBETRIQ) 50 MG TB24 Take by mouth daily      multivitamin (THERAGRAN) TABS Take 1 tablet by mouth daily   naproxen (NAPROSYN) 500 mg tablet Take 1 tablet (500 mg total) by mouth 2 (two) times a day with meals 30 tablet 0    NOVOLOG FLEXPEN 100 units/mL injection pen Up to 50 units daily   15 pen 3    omeprazole (PriLOSEC) 20 mg delayed release capsule TAKE 1 CAPSULE DAILY 90 capsule 1    triamcinolone (KENALOG) 0 5 % ointment Apply topically 2 (two) times a day 30 g 0    acyclovir (ZOVIRAX) 400 MG tablet Take 2 tablets (800 mg total) by mouth 5 (five) times a day for 7 days 70 tablet 0     No current facility-administered medications for this visit  Allergies   Allergen Reactions    Atorvastatin Hives, Other (See Comments) and Itching     Pt  Reports "Hives"  Lipitor and Simvastatin    Simvastatin Rash     Other reaction(s): Edema, Other Reaction  "ALL STATINS"    Insulin Lispro Edema    Statins Itching    Other Other (See Comments), Itching and Rash     rash to electrodes  rash to electrodes and adhesives       Objective   Vitals: Blood pressure 166/68, pulse 96, height 6' 4" (1 93 m), weight 112 kg (246 lb 3 2 oz)  Invasive Devices          None          Physical Exam   Constitutional: He is oriented to person, place, and time  He appears well-developed and well-nourished  No distress  HENT:   Head: Normocephalic and atraumatic  Neck: Normal range of motion  Neck supple  No thyromegaly present  Cardiovascular: Normal rate and regular rhythm  No murmur heard  Pulmonary/Chest: Effort normal and breath sounds normal    Abdominal: Soft  Bowel sounds are normal    Musculoskeletal: Normal range of motion  He exhibits no edema  Neurological: He is alert and oriented to person, place, and time  He exhibits normal muscle tone  Skin: Skin is warm and dry  No rash noted  He is not diaphoretic  Psychiatric: He has a normal mood and affect  His behavior is normal    Vitals reviewed  The history was obtained from the review of the chart and from the patient      Lab Results:    Most recent [facility] is  Lab Results   Component Value Date    HGBA1C 10 1 (H) 03/12/2019           No components found for: HA1C  No components found for: GLU    Lab Results   Component Value Date    CREATININE 1 37 (H) 03/12/2019    CREATININE 1 61 (H) 02/22/2019    CREATININE 1 52 (H) 02/03/2019    BUN 20 03/12/2019     09/03/2015    K 4 3 03/12/2019     03/12/2019    CO2 30 03/12/2019 eGFR   Date Value Ref Range Status   03/12/2019 58 ml/min/1 73sq m Final     No components found for: [facility]    Lab Results   Component Value Date    CHOL 139 03/17/2015    HDL 36 (L) 11/01/2018    TRIG 152 (H) 11/01/2018       Lab Results   Component Value Date    ALT 62 03/12/2019    AST 35 03/12/2019    GGT 32 09/19/2018    ALKPHOS 88 03/12/2019    BILITOT 0 50 07/26/2015       Lab Results   Component Value Date    FREET4 0 83 12/19/2017       Recent Results (from the past 30251 hour(s))   HEMOGLOBIN A1C W/ EAG ESTIMATION    Collection Time: 04/11/18 10:27 AM   Result Value Ref Range    Hemoglobin A1C 9 3 (H) 4 2 - 6 3 %     mg/dl   Lipid panel    Collection Time: 04/20/18 12:00 AM   Result Value Ref Range    Triglycerides 230 (A) 150 mg/dL    Cholesterol 137 50 - 200 mg/dL    HDL, Direct 33 (A) 40 - 60 mg/dL    LDL Cholesterol 59 3    HEMOGLOBIN A1C W/ EAG ESTIMATION    Collection Time: 04/20/18 12:00 AM   Result Value Ref Range    Hemoglobin A1C 9 2 (A) 4 2 - 6 3 %   Microalbumin / creatinine urine ratio    Collection Time: 08/28/18 11:51 AM   Result Value Ref Range    Creatinine, Ur 199 0 mg/dL    Microalbum  ,U,Random 338 0 (H) 0 0 - 20 0 mg/L    Microalb Creat Ratio 170 (H) 0 - 30 mg/g creatinine   Basic metabolic panel    Collection Time: 08/28/18 11:51 AM   Result Value Ref Range    Sodium 137 136 - 145 mmol/L    Potassium 4 3 3 5 - 5 3 mmol/L    Chloride 101 100 - 108 mmol/L    CO2 28 21 - 32 mmol/L    ANION GAP 8 4 - 13 mmol/L    BUN 26 (H) 5 - 25 mg/dL    Creatinine 1 59 (H) 0 60 - 1 30 mg/dL    Glucose 181 (H) 65 - 140 mg/dL    Calcium 9 3 8 3 - 10 1 mg/dL    eGFR 48 ml/min/1 73sq m   Comprehensive metabolic panel    Collection Time: 09/19/18  9:42 AM   Result Value Ref Range    Sodium 139 136 - 145 mmol/L    Potassium 4 5 3 5 - 5 3 mmol/L    Chloride 105 100 - 108 mmol/L    CO2 27 21 - 32 mmol/L    ANION GAP 7 4 - 13 mmol/L    BUN 20 5 - 25 mg/dL    Creatinine 1 57 (H) 0 60 - 1 30 mg/dL Glucose, Fasting 106 (H) 65 - 99 mg/dL    Calcium 8 9 8 3 - 10 1 mg/dL    AST 28 5 - 45 U/L    ALT 47 12 - 78 U/L    Alkaline Phosphatase 90 46 - 116 U/L    Total Protein 7 5 6 4 - 8 2 g/dL    Albumin 3 6 3 5 - 5 0 g/dL    Total Bilirubin 0 53 0 20 - 1 00 mg/dL    eGFR 49 ml/min/1 73sq m   CBC and differential    Collection Time: 09/19/18  9:42 AM   Result Value Ref Range    WBC 7 98 4 31 - 10 16 Thousand/uL    RBC 3 96 3 88 - 5 62 Million/uL    Hemoglobin 12 4 12 0 - 17 0 g/dL    Hematocrit 37 0 36 5 - 49 3 %    MCV 93 82 - 98 fL    MCH 31 3 26 8 - 34 3 pg    MCHC 33 5 31 4 - 37 4 g/dL    RDW 13 0 11 6 - 15 1 %    MPV 10 7 8 9 - 12 7 fL    Platelets 132 278 - 061 Thousands/uL    nRBC 0 /100 WBCs    Neutrophils Relative 57 43 - 75 %    Immat GRANS % 0 0 - 2 %    Lymphocytes Relative 33 14 - 44 %    Monocytes Relative 7 4 - 12 %    Eosinophils Relative 3 0 - 6 %    Basophils Relative 0 0 - 1 %    Neutrophils Absolute 4 47 1 85 - 7 62 Thousands/µL    Immature Grans Absolute 0 02 0 00 - 0 20 Thousand/uL    Lymphocytes Absolute 2 65 0 60 - 4 47 Thousands/µL    Monocytes Absolute 0 56 0 17 - 1 22 Thousand/µL    Eosinophils Absolute 0 25 0 00 - 0 61 Thousand/µL    Basophils Absolute 0 03 0 00 - 0 10 Thousands/µL   Gamma GT    Collection Time: 09/19/18  9:42 AM   Result Value Ref Range    GGT 32 5 - 85 U/L   Magnesium    Collection Time: 09/19/18  9:42 AM   Result Value Ref Range    Magnesium 2 1 1 6 - 2 6 mg/dL   APTT    Collection Time: 09/19/18  9:42 AM   Result Value Ref Range    PTT 30 24 - 36 seconds   Protime-INR    Collection Time: 09/19/18  9:42 AM   Result Value Ref Range    Protime 13 1 11 8 - 14 2 seconds    INR 0 98 0 86 - 1 17   Ferritin    Collection Time: 09/19/18  9:42 AM   Result Value Ref Range    Ferritin 73 8 - 388 ng/mL   POCT hemoglobin A1c    Collection Time: 09/20/18  8:59 AM   Result Value Ref Range    Hemoglobin A1C 7 6    Hm Diabetes Eye Exam    Collection Time: 11/01/18 12:00 AM   Result Value Ref Range    Severity Normal     Right Eye Diabetic Retinopathy None     Left Eye Diabetic Retinopathy None    Comprehensive metabolic panel Lab Collect    Collection Time: 11/01/18  7:30 AM   Result Value Ref Range    Sodium 136 136 - 145 mmol/L    Potassium 4 6 3 5 - 5 3 mmol/L    Chloride 104 100 - 108 mmol/L    CO2 30 21 - 32 mmol/L    ANION GAP 2 (L) 4 - 13 mmol/L    BUN 27 (H) 5 - 25 mg/dL    Creatinine 1 44 (H) 0 60 - 1 30 mg/dL    Glucose, Fasting 80 65 - 99 mg/dL    Calcium 8 7 8 3 - 10 1 mg/dL    AST 59 (H) 5 - 45 U/L    ALT 86 (H) 12 - 78 U/L    Alkaline Phosphatase 101 46 - 116 U/L    Total Protein 7 1 6 4 - 8 2 g/dL    Albumin 3 4 (L) 3 5 - 5 0 g/dL    Total Bilirubin 0 38 0 20 - 1 00 mg/dL    eGFR 55 ml/min/1 73sq m   HEMOGLOBIN A1C W/ EAG ESTIMATION Lab Collect    Collection Time: 11/01/18  7:30 AM   Result Value Ref Range    Hemoglobin A1C 8 4 (H) 4 2 - 6 3 %     mg/dl   TSH, 3rd generation Lab Collect    Collection Time: 11/01/18  7:30 AM   Result Value Ref Range    TSH 3RD GENERATON 2 260 0 358 - 3 740 uIU/mL   Microalbumin / creatinine urine ratio- Lab Collect    Collection Time: 11/01/18  7:30 AM   Result Value Ref Range    Creatinine, Ur 129 0 mg/dL    Microalbum  ,U,Random 369 0 (H) 0 0 - 20 0 mg/L    Microalb Creat Ratio 286 (H) 0 - 30 mg/g creatinine   Lipid Panel with Direct LDL reflex Lab Collect    Collection Time: 11/01/18  7:30 AM   Result Value Ref Range    Cholesterol 144 50 - 200 mg/dL    Triglycerides 152 (H) <=150 mg/dL    HDL, Direct 36 (L) 40 - 60 mg/dL    LDL Calculated 78 0 - 100 mg/dL   CBC and differential- Lab Collect    Collection Time: 11/01/18  7:30 AM   Result Value Ref Range    WBC 7 63 4 31 - 10 16 Thousand/uL    RBC 4 29 3 88 - 5 62 Million/uL    Hemoglobin 13 2 12 0 - 17 0 g/dL    Hematocrit 40 3 36 5 - 49 3 %    MCV 94 82 - 98 fL    MCH 30 8 26 8 - 34 3 pg    MCHC 32 8 31 4 - 37 4 g/dL    RDW 13 2 11 6 - 15 1 %    MPV 11 2 8 9 - 12 7 fL    Platelets 880 775 - 390 Thousands/uL    nRBC 0 /100 WBCs    Neutrophils Relative 51 43 - 75 %    Immat GRANS % 0 0 - 2 %    Lymphocytes Relative 38 14 - 44 %    Monocytes Relative 8 4 - 12 %    Eosinophils Relative 2 0 - 6 %    Basophils Relative 1 0 - 1 %    Neutrophils Absolute 3 88 1 85 - 7 62 Thousands/µL    Immature Grans Absolute 0 03 0 00 - 0 20 Thousand/uL    Lymphocytes Absolute 2 92 0 60 - 4 47 Thousands/µL    Monocytes Absolute 0 59 0 17 - 1 22 Thousand/µL    Eosinophils Absolute 0 17 0 00 - 0 61 Thousand/µL    Basophils Absolute 0 04 0 00 - 0 10 Thousands/µL   Urinalysis with microscopic    Collection Time: 11/21/18 11:29 AM   Result Value Ref Range    Clarity, UA Clear     Color, UA Yellow     Specific [place], UA 1 017 1 003 - 1 030    pH, UA 5 5 4 5 - 8 0    Glucose,  (1/4%) (A) Negative mg/dl    Ketones, UA Negative Negative mg/dl    Blood, UA Small (A) Negative    Protein,  (2+) (A) Negative mg/dl    Nitrite, UA Negative Negative    Bilirubin, UA Negative Negative    Urobilinogen, UA 0 2 0 2, 1 0 E U /dl E U /dl    Leukocytes, UA Small (A) Negative    WBC, UA 20-30 (A) None Seen, 0-5, 5-55, 5-65 /hpf    RBC, UA 4-10 (A) None Seen, 0-5 /hpf    Hyaline Casts, UA None Seen None Seen /lpf    Bacteria, UA None Seen None Seen, Occasional /hpf    Epithelial Cells None Seen None Seen, Occasional /hpf   Urine culture    Collection Time: 11/21/18 11:29 AM   Result Value Ref Range    Urine Culture <10,000 cfu/ml     Fingerstick Glucose (POCT)    Collection Time: 01/31/19  5:57 PM   Result Value Ref Range    POC Glucose 315 (H) 65 - 140 mg/dl   Fingerstick Glucose (POCT)    Collection Time: 01/31/19  9:13 PM   Result Value Ref Range    POC Glucose 285 (H) 65 - 140 mg/dl   Comprehensive metabolic panel    Collection Time: 02/01/19  5:03 AM   Result Value Ref Range    Sodium 136 136 - 145 mmol/L    Potassium 4 1 3 5 - 5 3 mmol/L    Chloride 102 100 - 108 mmol/L    CO2 26 21 - 32 mmol/L    ANION GAP 8 4 - 13 mmol/L    BUN 30 (H) 5 - 25 mg/dL    Creatinine 1 62 (H) 0 60 - 1 30 mg/dL    Glucose 328 (H) 65 - 140 mg/dL    Calcium 8 7 8 3 - 10 1 mg/dL    AST 42 5 - 45 U/L    ALT 86 (H) 12 - 78 U/L    Alkaline Phosphatase 81 46 - 116 U/L    Total Protein 6 6 6 4 - 8 2 g/dL    Albumin 3 3 (L) 3 5 - 5 0 g/dL    Total Bilirubin 0 53 0 20 - 1 00 mg/dL    eGFR 47 ml/min/1 73sq m   CBC and differential    Collection Time: 02/01/19  5:03 AM   Result Value Ref Range    WBC 9 14 4 31 - 10 16 Thousand/uL    RBC 3 74 (L) 3 88 - 5 62 Million/uL    Hemoglobin 11 9 (L) 12 0 - 17 0 g/dL    Hematocrit 34 3 (L) 36 5 - 49 3 %    MCV 92 82 - 98 fL    MCH 31 8 26 8 - 34 3 pg    MCHC 34 7 31 4 - 37 4 g/dL    RDW 12 8 11 6 - 15 1 %    MPV 11 1 8 9 - 12 7 fL    Platelets 029 858 - 292 Thousands/uL    nRBC 0 /100 WBCs    Neutrophils Relative 70 43 - 75 %    Immat GRANS % 1 0 - 2 %    Lymphocytes Relative 22 14 - 44 %    Monocytes Relative 7 4 - 12 %    Eosinophils Relative 0 0 - 6 %    Basophils Relative 0 0 - 1 %    Neutrophils Absolute 6 43 1 85 - 7 62 Thousands/µL    Immature Grans Absolute 0 05 0 00 - 0 20 Thousand/uL    Lymphocytes Absolute 1 99 0 60 - 4 47 Thousands/µL    Monocytes Absolute 0 64 0 17 - 1 22 Thousand/µL    Eosinophils Absolute 0 02 0 00 - 0 61 Thousand/µL    Basophils Absolute 0 01 0 00 - 0 10 Thousands/µL   Comprehensive metabolic panel    Collection Time: 02/01/19  5:03 AM   Result Value Ref Range    Sodium 136 136 - 145 mmol/L    Potassium 4 1 3 5 - 5 3 mmol/L    Chloride 103 100 - 108 mmol/L    CO2 26 21 - 32 mmol/L    ANION GAP 7 4 - 13 mmol/L    BUN 33 (H) 5 - 25 mg/dL    Creatinine 1 59 (H) 0 60 - 1 30 mg/dL    Glucose 336 (H) 65 - 140 mg/dL    Calcium 8 7 8 3 - 10 1 mg/dL    AST 44 5 - 45 U/L    ALT 85 (H) 12 - 78 U/L    Alkaline Phosphatase 83 46 - 116 U/L    Total Protein 6 6 6 4 - 8 2 g/dL    Albumin 3 4 (L) 3 5 - 5 0 g/dL    Total Bilirubin 0 53 0 20 - 1 00 mg/dL    eGFR 48 ml/min/1 73sq m   Magnesium    Collection Time: 02/01/19 5:03 AM   Result Value Ref Range    Magnesium 2 1 1 6 - 2 6 mg/dL   Phosphorus    Collection Time: 02/01/19  5:03 AM   Result Value Ref Range    Phosphorus 3 1 2 3 - 4 1 mg/dL   CBC (With Platelets)    Collection Time: 02/01/19  5:03 AM   Result Value Ref Range    WBC 9 14 4 31 - 10 16 Thousand/uL    RBC 3 74 (L) 3 88 - 5 62 Million/uL    Hemoglobin 11 9 (L) 12 0 - 17 0 g/dL    Hematocrit 34 3 (L) 36 5 - 49 3 %    MCV 92 82 - 98 fL    MCH 31 8 26 8 - 34 3 pg    MCHC 34 7 31 4 - 37 4 g/dL    RDW 12 8 11 6 - 15 1 %    Platelets 752 677 - 570 Thousands/uL    MPV 11 1 8 9 - 12 7 fL   Fingerstick Glucose (POCT)    Collection Time: 02/01/19  7:42 AM   Result Value Ref Range    POC Glucose 264 (H) 65 - 140 mg/dl   Fingerstick Glucose (POCT)    Collection Time: 02/01/19 11:55 AM   Result Value Ref Range    POC Glucose 381 (H) 65 - 140 mg/dl   Fingerstick Glucose (POCT)    Collection Time: 02/01/19  5:10 PM   Result Value Ref Range    POC Glucose 424 (H) 65 - 140 mg/dl   Fingerstick Glucose (POCT)    Collection Time: 02/01/19  9:00 PM   Result Value Ref Range    POC Glucose >500 (HH) 65 - 140 mg/dl   Basic metabolic panel    Collection Time: 02/01/19  9:24 PM   Result Value Ref Range    Sodium 131 (L) 136 - 145 mmol/L    Potassium 4 4 3 5 - 5 3 mmol/L    Chloride 98 (L) 100 - 108 mmol/L    CO2 24 21 - 32 mmol/L    ANION GAP 9 4 - 13 mmol/L    BUN 38 (H) 5 - 25 mg/dL    Creatinine 1 84 (H) 0 60 - 1 30 mg/dL    Glucose 598 (HH) 65 - 140 mg/dL    Calcium 8 6 8 3 - 10 1 mg/dL    eGFR 41 ml/min/1 73sq m   Hemoglobin A1C    Collection Time: 02/01/19  9:24 PM   Result Value Ref Range    Hemoglobin A1C 8 8 (H) 4 2 - 6 3 %     mg/dl   Fingerstick Glucose (POCT)    Collection Time: 02/02/19  2:00 AM   Result Value Ref Range    POC Glucose 366 (H) 65 - 140 mg/dl   Fingerstick Glucose (POCT)    Collection Time: 02/02/19  3:41 AM   Result Value Ref Range    POC Glucose 341 (H) 65 - 140 mg/dl   Comprehensive metabolic panel Collection Time: 02/02/19  5:09 AM   Result Value Ref Range    Sodium 134 (L) 136 - 145 mmol/L    Potassium 4 4 3 5 - 5 3 mmol/L    Chloride 101 100 - 108 mmol/L    CO2 26 21 - 32 mmol/L    ANION GAP 7 4 - 13 mmol/L    BUN 32 (H) 5 - 25 mg/dL    Creatinine 1 53 (H) 0 60 - 1 30 mg/dL    Glucose 318 (H) 65 - 140 mg/dL    Calcium 8 9 8 3 - 10 1 mg/dL    AST 47 (H) 5 - 45 U/L     (H) 12 - 78 U/L    Alkaline Phosphatase 89 46 - 116 U/L    Total Protein 7 1 6 4 - 8 2 g/dL    Albumin 3 5 3 5 - 5 0 g/dL    Total Bilirubin 0 49 0 20 - 1 00 mg/dL    eGFR 51 ml/min/1 73sq m   CBC and differential    Collection Time: 02/02/19  5:09 AM   Result Value Ref Range    WBC 8 25 4 31 - 10 16 Thousand/uL    RBC 3 94 3 88 - 5 62 Million/uL    Hemoglobin 12 7 12 0 - 17 0 g/dL    Hematocrit 36 3 (L) 36 5 - 49 3 %    MCV 92 82 - 98 fL    MCH 32 2 26 8 - 34 3 pg    MCHC 35 0 31 4 - 37 4 g/dL    RDW 12 6 11 6 - 15 1 %    MPV 10 8 8 9 - 12 7 fL    Platelets 594 318 - 904 Thousands/uL    nRBC 0 /100 WBCs    Neutrophils Relative 77 (H) 43 - 75 %    Immat GRANS % 1 0 - 2 %    Lymphocytes Relative 17 14 - 44 %    Monocytes Relative 5 4 - 12 %    Eosinophils Relative 0 0 - 6 %    Basophils Relative 0 0 - 1 %    Neutrophils Absolute 6 38 1 85 - 7 62 Thousands/µL    Immature Grans Absolute 0 06 0 00 - 0 20 Thousand/uL    Lymphocytes Absolute 1 41 0 60 - 4 47 Thousands/µL    Monocytes Absolute 0 39 0 17 - 1 22 Thousand/µL    Eosinophils Absolute 0 00 0 00 - 0 61 Thousand/µL    Basophils Absolute 0 01 0 00 - 0 10 Thousands/µL   Fingerstick Glucose (POCT)    Collection Time: 02/02/19  7:26 AM   Result Value Ref Range    POC Glucose 280 (H) 65 - 140 mg/dl   Fingerstick Glucose (POCT)    Collection Time: 02/02/19 12:00 PM   Result Value Ref Range    POC Glucose 346 (H) 65 - 140 mg/dl   Fingerstick Glucose (POCT)    Collection Time: 02/02/19  5:06 PM   Result Value Ref Range    POC Glucose >500 (HH) 65 - 140 mg/dl   Fingerstick Glucose (POCT) Collection Time: 02/02/19  5:08 PM   Result Value Ref Range    POC Glucose >500 (HH) 65 - 140 mg/dl   Fingerstick Glucose (POCT)    Collection Time: 02/02/19  6:58 PM   Result Value Ref Range    POC Glucose >500 (HH) 65 - 140 mg/dl   Fingerstick Glucose (POCT)    Collection Time: 02/02/19  9:03 PM   Result Value Ref Range    POC Glucose 441 (H) 65 - 140 mg/dl   Fingerstick Glucose (POCT)    Collection Time: 02/02/19 11:02 PM   Result Value Ref Range    POC Glucose 322 (H) 65 - 140 mg/dl   Fingerstick Glucose (POCT)    Collection Time: 02/03/19  1:05 AM   Result Value Ref Range    POC Glucose 296 (H) 65 - 140 mg/dl   Fingerstick Glucose (POCT)    Collection Time: 02/03/19  3:16 AM   Result Value Ref Range    POC Glucose 183 (H) 65 - 140 mg/dl   Fingerstick Glucose (POCT)    Collection Time: 02/03/19  5:11 AM   Result Value Ref Range    POC Glucose 159 (H) 65 - 140 mg/dl   Basic metabolic panel    Collection Time: 02/03/19  5:15 AM   Result Value Ref Range    Sodium 135 (L) 136 - 145 mmol/L    Potassium 4 1 3 5 - 5 3 mmol/L    Chloride 103 100 - 108 mmol/L    CO2 26 21 - 32 mmol/L    ANION GAP 6 4 - 13 mmol/L    BUN 35 (H) 5 - 25 mg/dL    Creatinine 1 52 (H) 0 60 - 1 30 mg/dL    Glucose 158 (H) 65 - 140 mg/dL    Calcium 9 2 8 3 - 10 1 mg/dL    eGFR 51 ml/min/1 73sq m   Fingerstick Glucose (POCT)    Collection Time: 02/03/19  7:04 AM   Result Value Ref Range    POC Glucose 178 (H) 65 - 140 mg/dl   Fingerstick Glucose (POCT)    Collection Time: 02/03/19  9:08 AM   Result Value Ref Range    POC Glucose 249 (H) 65 - 140 mg/dl   Protime-INR    Collection Time: 02/03/19  9:38 AM   Result Value Ref Range    Protime 13 1 11 8 - 14 2 seconds    INR 0 98 0 86 - 1 17   Fingerstick Glucose (POCT)    Collection Time: 02/03/19 11:02 AM   Result Value Ref Range    POC Glucose 268 (H) 65 - 140 mg/dl   UA (URINE) with reflex to Microscopic    Collection Time: 02/03/19 12:27 PM   Result Value Ref Range    Color, UA Yellow     Clarity, UA Clear     Specific Gravity, UA 1 017 1 003 - 1 030    pH, UA 5 5 4 5 - 8 0    Leukocytes, UA Trace (A) Negative    Nitrite, UA Negative Negative    Protein,  (2+) (A) Negative mg/dl    Glucose,  (1/10%) (A) Negative mg/dl    Ketones, UA Negative Negative mg/dl    Urobilinogen, UA 1 0 0 2, 1 0 E U /dl E U /dl    Bilirubin, UA Negative Negative    Blood, UA Small (A) Negative   Urine Microscopic    Collection Time: 02/03/19 12:27 PM   Result Value Ref Range    RBC, UA 2-4 (A) None Seen, 0-5 /hpf    WBC, UA 4-10 (A) None Seen, 0-5, 5-55, 5-65 /hpf    Epithelial Cells None Seen None Seen, Occasional /hpf    Bacteria, UA None Seen None Seen, Occasional /hpf    Hyaline Casts, UA None Seen None Seen /lpf   Fingerstick Glucose (POCT)    Collection Time: 02/03/19 12:57 PM   Result Value Ref Range    POC Glucose 196 (H) 65 - 140 mg/dl   Fingerstick Glucose (POCT)    Collection Time: 02/03/19  3:07 PM   Result Value Ref Range    POC Glucose 308 (H) 65 - 140 mg/dl   Fingerstick Glucose (POCT)    Collection Time: 02/03/19  5:12 PM   Result Value Ref Range    POC Glucose 180 (H) 65 - 140 mg/dl   Fingerstick Glucose (POCT)    Collection Time: 02/03/19  7:00 PM   Result Value Ref Range    POC Glucose 394 (H) 65 - 140 mg/dl   Fingerstick Glucose (POCT)    Collection Time: 02/03/19  9:04 PM   Result Value Ref Range    POC Glucose 273 (H) 65 - 140 mg/dl   Fingerstick Glucose (POCT)    Collection Time: 02/03/19 11:14 PM   Result Value Ref Range    POC Glucose 295 (H) 65 - 140 mg/dl   Fingerstick Glucose (POCT)    Collection Time: 02/04/19  1:20 AM   Result Value Ref Range    POC Glucose 206 (H) 65 - 140 mg/dl   Fingerstick Glucose (POCT)    Collection Time: 02/04/19  3:07 AM   Result Value Ref Range    POC Glucose 131 65 - 140 mg/dl   Fingerstick Glucose (POCT)    Collection Time: 02/04/19  5:30 AM   Result Value Ref Range    POC Glucose 83 65 - 140 mg/dl   Fingerstick Glucose (POCT)    Collection Time: 02/04/19  6:58 AM Result Value Ref Range    POC Glucose 205 (H) 65 - 140 mg/dl   Fingerstick Glucose (POCT)    Collection Time: 02/04/19  9:07 AM   Result Value Ref Range    POC Glucose 158 (H) 65 - 140 mg/dl   Fingerstick Glucose (POCT)    Collection Time: 02/04/19 11:06 AM   Result Value Ref Range    POC Glucose 115 65 - 140 mg/dl   Fingerstick Glucose (POCT)    Collection Time: 02/04/19 12:02 PM   Result Value Ref Range    POC Glucose 112 65 - 140 mg/dl   CBC and differential    Collection Time: 02/22/19 10:17 AM   Result Value Ref Range    WBC 9 08 4 31 - 10 16 Thousand/uL    RBC 4 29 3 88 - 5 62 Million/uL    Hemoglobin 13 5 12 0 - 17 0 g/dL    Hematocrit 40 2 36 5 - 49 3 %    MCV 94 82 - 98 fL    MCH 31 5 26 8 - 34 3 pg    MCHC 33 6 31 4 - 37 4 g/dL    RDW 13 1 11 6 - 15 1 %    MPV 10 9 8 9 - 12 7 fL    Platelets 442 953 - 244 Thousands/uL    nRBC 0 /100 WBCs    Neutrophils Relative 60 43 - 75 %    Immat GRANS % 1 0 - 2 %    Lymphocytes Relative 30 14 - 44 %    Monocytes Relative 7 4 - 12 %    Eosinophils Relative 1 0 - 6 %    Basophils Relative 1 0 - 1 %    Neutrophils Absolute 5 51 1 85 - 7 62 Thousands/µL    Immature Grans Absolute 0 09 0 00 - 0 20 Thousand/uL    Lymphocytes Absolute 2 70 0 60 - 4 47 Thousands/µL    Monocytes Absolute 0 61 0 17 - 1 22 Thousand/µL    Eosinophils Absolute 0 12 0 00 - 0 61 Thousand/µL    Basophils Absolute 0 05 0 00 - 0 10 Thousands/µL   Basic metabolic panel    Collection Time: 02/22/19 10:17 AM   Result Value Ref Range    Sodium 136 136 - 145 mmol/L    Potassium 4 6 3 5 - 5 3 mmol/L    Chloride 102 100 - 108 mmol/L    CO2 26 21 - 32 mmol/L    ANION GAP 8 4 - 13 mmol/L    BUN 28 (H) 5 - 25 mg/dL    Creatinine 1 61 (H) 0 60 - 1 30 mg/dL    Glucose 216 (H) 65 - 140 mg/dL    Calcium 9 0 8 3 - 10 1 mg/dL    eGFR 47 ml/min/1 73sq m   Magnesium    Collection Time: 02/22/19 10:17 AM   Result Value Ref Range    Magnesium 2 3 1 6 - 2 6 mg/dL   Sedimentation rate, automated    Collection Time: 02/22/19 10:17 AM   Result Value Ref Range    Sed Rate 21 (H) 0 - 10 mm/hour   C-reactive protein    Collection Time: 02/22/19 10:17 AM   Result Value Ref Range    CRP <3 0 <3 0 mg/L   D-dimer, quantitative    Collection Time: 02/22/19 10:17 AM   Result Value Ref Range    D-Dimer, Quant 1,689 (H) <500 ng/ml (FEU)   HEMOGLOBIN A1C W/ EAG ESTIMATION Lab Collect    Collection Time: 03/12/19  8:28 AM   Result Value Ref Range    Hemoglobin A1C 10 1 (H) 4 2 - 6 3 %     mg/dl   Comprehensive metabolic panel Lab Collect    Collection Time: 03/12/19  8:28 AM   Result Value Ref Range    Sodium 139 136 - 145 mmol/L    Potassium 4 3 3 5 - 5 3 mmol/L    Chloride 104 100 - 108 mmol/L    CO2 30 21 - 32 mmol/L    ANION GAP 5 4 - 13 mmol/L    BUN 20 5 - 25 mg/dL    Creatinine 1 37 (H) 0 60 - 1 30 mg/dL    Glucose, Fasting 129 (H) 65 - 99 mg/dL    Calcium 8 9 8 3 - 10 1 mg/dL    AST 35 5 - 45 U/L    ALT 62 12 - 78 U/L    Alkaline Phosphatase 88 46 - 116 U/L    Total Protein 7 0 6 4 - 8 2 g/dL    Albumin 3 6 3 5 - 5 0 g/dL    Total Bilirubin 0 58 0 20 - 1 00 mg/dL    eGFR 58 ml/min/1 73sq m       Future Appointments   Date Time Provider Anisa Cheri   4/9/2019  9:00 AM [facility] 1 [facility]   4/16/2019  8:30 AM Jamin Sosa MD [facility]       Portions of the record may have been created with voice recognition software  Occasional wrong word or "sound a like" substitutions may have occurred due to the inherent limitations of voice recognition software  Read the chart carefully and recognize, using context, where substitutions have occurred

## 2019-04-01 DIAGNOSIS — Z79.4 TYPE 2 DIABETES MELLITUS WITH DIABETIC PERIPHERAL ANGIOPATHY WITHOUT GANGRENE, WITH LONG-TERM CURRENT USE OF INSULIN (HCC): ICD-10-CM

## 2019-04-01 DIAGNOSIS — E11.51 TYPE 2 DIABETES MELLITUS WITH DIABETIC PERIPHERAL ANGIOPATHY WITHOUT GANGRENE, WITH LONG-TERM CURRENT USE OF INSULIN (HCC): ICD-10-CM

## 2019-04-09 ENCOUNTER — HOSPITAL ENCOUNTER (OUTPATIENT)
Dept: NON INVASIVE DIAGNOSTICS | Facility: CLINIC | Age: 74
Discharge: HOME | End: 2019-04-09
Payer: MEDICARE

## 2019-04-09 DIAGNOSIS — I80.9 SUPERFICIAL PHLEBITIS: ICD-10-CM

## 2019-04-09 DIAGNOSIS — D09.0 CARCINOMA IN SITU OF BLADDER: ICD-10-CM

## 2019-04-09 PROCEDURE — 93971 EXTREMITY STUDY: CPT | Performed by: SURGERY

## 2019-04-09 PROCEDURE — 93971 EXTREMITY STUDY: CPT

## 2019-04-12 ENCOUNTER — TELEPHONE (OUTPATIENT)
Dept: ENDOCRINOLOGY | Facility: CLINIC | Age: 74
End: 2019-04-12

## 2019-04-16 ENCOUNTER — CONSULT (OUTPATIENT)
Dept: INTERNAL MEDICINE CLINIC | Facility: CLINIC | Age: 74
End: 2019-04-16
Payer: MEDICARE

## 2019-04-16 VITALS
TEMPERATURE: 97.8 F | WEIGHT: 245.4 LBS | HEART RATE: 83 BPM | BODY MASS INDEX: 29.88 KG/M2 | HEIGHT: 76 IN | SYSTOLIC BLOOD PRESSURE: 118 MMHG | DIASTOLIC BLOOD PRESSURE: 80 MMHG

## 2019-04-16 DIAGNOSIS — Z79.4 TYPE 2 DIABETES MELLITUS WITH DIABETIC PERIPHERAL ANGIOPATHY WITHOUT GANGRENE, WITH LONG-TERM CURRENT USE OF INSULIN (HCC): Primary | ICD-10-CM

## 2019-04-16 DIAGNOSIS — Z01.818 PREOP EXAMINATION: ICD-10-CM

## 2019-04-16 DIAGNOSIS — M51.36 DEGENERATION OF LUMBAR INTERVERTEBRAL DISC: ICD-10-CM

## 2019-04-16 DIAGNOSIS — N18.30 CKD (CHRONIC KIDNEY DISEASE) STAGE 3, GFR 30-59 ML/MIN (HCC): ICD-10-CM

## 2019-04-16 DIAGNOSIS — D09.0 CARCINOMA IN SITU OF BLADDER: ICD-10-CM

## 2019-04-16 DIAGNOSIS — E11.51 TYPE 2 DIABETES MELLITUS WITH DIABETIC PERIPHERAL ANGIOPATHY WITHOUT GANGRENE, WITH LONG-TERM CURRENT USE OF INSULIN (HCC): Primary | ICD-10-CM

## 2019-04-16 DIAGNOSIS — I10 ESSENTIAL HYPERTENSION: ICD-10-CM

## 2019-04-16 DIAGNOSIS — I25.10 CORONARY ARTERY DISEASE INVOLVING NATIVE CORONARY ARTERY OF NATIVE HEART WITHOUT ANGINA PECTORIS: ICD-10-CM

## 2019-04-16 PROCEDURE — 99213 OFFICE O/P EST LOW 20 MIN: CPT | Performed by: INTERNAL MEDICINE

## 2019-04-24 LAB
HBA1C MFR BLD HPLC: 8.9 %
HBA1C MFR BLD HPLC: 8.9 %

## 2019-04-26 DIAGNOSIS — E11.8 TYPE 2 DIABETES MELLITUS WITH COMPLICATION, UNSPECIFIED WHETHER LONG TERM INSULIN USE: ICD-10-CM

## 2019-04-29 ENCOUNTER — TRANSITIONAL CARE MANAGEMENT (OUTPATIENT)
Dept: INTERNAL MEDICINE CLINIC | Facility: CLINIC | Age: 74
End: 2019-04-29

## 2019-05-07 ENCOUNTER — TELEPHONE (OUTPATIENT)
Dept: ENDOCRINOLOGY | Facility: HOSPITAL | Age: 74
End: 2019-05-07

## 2019-05-07 DIAGNOSIS — E11.8 TYPE 2 DIABETES MELLITUS WITH COMPLICATION, UNSPECIFIED WHETHER LONG TERM INSULIN USE: ICD-10-CM

## 2019-05-08 ENCOUNTER — OFFICE VISIT (OUTPATIENT)
Dept: INTERNAL MEDICINE CLINIC | Facility: CLINIC | Age: 74
End: 2019-05-08
Payer: MEDICARE

## 2019-05-08 VITALS
HEART RATE: 89 BPM | BODY MASS INDEX: 29.71 KG/M2 | RESPIRATION RATE: 14 BRPM | TEMPERATURE: 97.9 F | DIASTOLIC BLOOD PRESSURE: 78 MMHG | WEIGHT: 244 LBS | SYSTOLIC BLOOD PRESSURE: 137 MMHG | HEIGHT: 76 IN

## 2019-05-08 DIAGNOSIS — E78.5 HYPERLIPIDEMIA, UNSPECIFIED HYPERLIPIDEMIA TYPE: ICD-10-CM

## 2019-05-08 DIAGNOSIS — N18.30 CKD (CHRONIC KIDNEY DISEASE) STAGE 3, GFR 30-59 ML/MIN (HCC): ICD-10-CM

## 2019-05-08 DIAGNOSIS — E11.51 TYPE 2 DIABETES MELLITUS WITH DIABETIC PERIPHERAL ANGIOPATHY WITHOUT GANGRENE, WITH LONG-TERM CURRENT USE OF INSULIN (HCC): Primary | ICD-10-CM

## 2019-05-08 DIAGNOSIS — Z23 ENCOUNTER FOR IMMUNIZATION: ICD-10-CM

## 2019-05-08 DIAGNOSIS — I25.10 CORONARY ARTERY DISEASE INVOLVING NATIVE CORONARY ARTERY OF NATIVE HEART WITHOUT ANGINA PECTORIS: ICD-10-CM

## 2019-05-08 DIAGNOSIS — I10 ESSENTIAL HYPERTENSION: ICD-10-CM

## 2019-05-08 DIAGNOSIS — Z79.4 TYPE 2 DIABETES MELLITUS WITH DIABETIC PERIPHERAL ANGIOPATHY WITHOUT GANGRENE, WITH LONG-TERM CURRENT USE OF INSULIN (HCC): Primary | ICD-10-CM

## 2019-05-08 DIAGNOSIS — C67.9 MALIGNANT NEOPLASM OF URINARY BLADDER, UNSPECIFIED SITE (HCC): ICD-10-CM

## 2019-05-08 PROCEDURE — 90471 IMMUNIZATION ADMIN: CPT | Performed by: INTERNAL MEDICINE

## 2019-05-08 PROCEDURE — 99495 TRANSJ CARE MGMT MOD F2F 14D: CPT | Performed by: INTERNAL MEDICINE

## 2019-05-08 PROCEDURE — 86580 TB INTRADERMAL TEST: CPT | Performed by: INTERNAL MEDICINE

## 2019-05-08 RX ORDER — LISINOPRIL 5 MG/1
5 TABLET ORAL DAILY
COMMUNITY
End: 2019-10-10 | Stop reason: ALTCHOICE

## 2019-05-10 ENCOUNTER — APPOINTMENT (OUTPATIENT)
Dept: LAB | Facility: HOSPITAL | Age: 74
End: 2019-05-10
Payer: MEDICARE

## 2019-05-10 DIAGNOSIS — R76.11 POSITIVE PPD: ICD-10-CM

## 2019-05-10 DIAGNOSIS — R76.11 POSITIVE PPD: Primary | ICD-10-CM

## 2019-05-10 LAB
INDURATION: ABNORMAL MM
TB SKIN TEST: POSITIVE

## 2019-05-10 PROCEDURE — 86480 TB TEST CELL IMMUN MEASURE: CPT

## 2019-05-10 PROCEDURE — 36415 COLL VENOUS BLD VENIPUNCTURE: CPT

## 2019-05-13 DIAGNOSIS — K21.9 GASTROESOPHAGEAL REFLUX DISEASE WITHOUT ESOPHAGITIS: Primary | ICD-10-CM

## 2019-05-13 LAB
GAMMA INTERFERON BACKGROUND BLD IA-ACNC: 0.05 IU/ML
M TB IFN-G BLD-IMP: NEGATIVE
M TB IFN-G CD4+ BCKGRND COR BLD-ACNC: -0.01 IU/ML
M TB IFN-G CD4+ BCKGRND COR BLD-ACNC: -0.02 IU/ML
MITOGEN IGNF BCKGRD COR BLD-ACNC: >10 IU/ML

## 2019-05-13 RX ORDER — OMEPRAZOLE 20 MG/1
20 CAPSULE, DELAYED RELEASE ORAL DAILY
Qty: 90 CAPSULE | Refills: 1 | Status: SHIPPED | OUTPATIENT
Start: 2019-05-13 | End: 2021-01-06 | Stop reason: HOSPADM

## 2019-05-14 ENCOUNTER — TELEPHONE (OUTPATIENT)
Dept: ENDOCRINOLOGY | Facility: HOSPITAL | Age: 74
End: 2019-05-14

## 2019-05-14 DIAGNOSIS — Z79.4 TYPE 2 DIABETES MELLITUS WITH COMPLICATION, WITH LONG-TERM CURRENT USE OF INSULIN (HCC): ICD-10-CM

## 2019-05-14 DIAGNOSIS — E11.8 TYPE 2 DIABETES MELLITUS WITH COMPLICATION, WITH LONG-TERM CURRENT USE OF INSULIN (HCC): ICD-10-CM

## 2019-05-14 RX ORDER — INSULIN GLARGINE 100 [IU]/ML
INJECTION, SOLUTION SUBCUTANEOUS
Qty: 15 PEN | Refills: 3
Start: 2019-05-14 | End: 2019-05-30 | Stop reason: SDUPTHER

## 2019-05-30 ENCOUNTER — TELEPHONE (OUTPATIENT)
Dept: ENDOCRINOLOGY | Facility: HOSPITAL | Age: 74
End: 2019-05-30

## 2019-05-30 DIAGNOSIS — E11.8 TYPE 2 DIABETES MELLITUS WITH COMPLICATION, WITH LONG-TERM CURRENT USE OF INSULIN (HCC): ICD-10-CM

## 2019-05-30 DIAGNOSIS — Z79.4 TYPE 2 DIABETES MELLITUS WITH COMPLICATION, WITH LONG-TERM CURRENT USE OF INSULIN (HCC): ICD-10-CM

## 2019-05-30 RX ORDER — INSULIN GLARGINE 100 [IU]/ML
INJECTION, SOLUTION SUBCUTANEOUS
Qty: 15 PEN | Refills: 3
Start: 2019-05-30 | End: 2019-06-10 | Stop reason: SDUPTHER

## 2019-06-10 ENCOUNTER — TELEPHONE (OUTPATIENT)
Dept: ENDOCRINOLOGY | Facility: HOSPITAL | Age: 74
End: 2019-06-10

## 2019-06-10 DIAGNOSIS — Z79.4 TYPE 2 DIABETES MELLITUS WITH COMPLICATION, WITH LONG-TERM CURRENT USE OF INSULIN (HCC): ICD-10-CM

## 2019-06-10 DIAGNOSIS — E11.8 TYPE 2 DIABETES MELLITUS WITH COMPLICATION, WITH LONG-TERM CURRENT USE OF INSULIN (HCC): ICD-10-CM

## 2019-06-10 RX ORDER — INSULIN GLARGINE 100 [IU]/ML
INJECTION, SOLUTION SUBCUTANEOUS
Qty: 15 PEN | Refills: 3
Start: 2019-06-10 | End: 2020-06-19 | Stop reason: SDUPTHER

## 2019-06-23 DIAGNOSIS — E11.8 TYPE 2 DIABETES MELLITUS WITH COMPLICATION, UNSPECIFIED WHETHER LONG TERM INSULIN USE: ICD-10-CM

## 2019-06-23 RX ORDER — INSULIN ASPART 100 [IU]/ML
INJECTION, SOLUTION INTRAVENOUS; SUBCUTANEOUS
Qty: 15 PEN | Refills: 1 | Status: SHIPPED | OUTPATIENT
Start: 2019-06-23 | End: 2020-06-24 | Stop reason: SDUPTHER

## 2019-06-27 ENCOUNTER — TELEPHONE (OUTPATIENT)
Dept: ENDOCRINOLOGY | Facility: HOSPITAL | Age: 74
End: 2019-06-27

## 2019-06-27 ENCOUNTER — OFFICE VISIT (OUTPATIENT)
Dept: INTERNAL MEDICINE CLINIC | Facility: CLINIC | Age: 74
End: 2019-06-27
Payer: MEDICARE

## 2019-06-27 VITALS
TEMPERATURE: 97 F | SYSTOLIC BLOOD PRESSURE: 128 MMHG | BODY MASS INDEX: 30.03 KG/M2 | WEIGHT: 246.6 LBS | DIASTOLIC BLOOD PRESSURE: 68 MMHG | HEIGHT: 76 IN | HEART RATE: 72 BPM | RESPIRATION RATE: 16 BRPM

## 2019-06-27 DIAGNOSIS — Z79.4 TYPE 2 DIABETES MELLITUS WITH DIABETIC PERIPHERAL ANGIOPATHY WITHOUT GANGRENE, WITH LONG-TERM CURRENT USE OF INSULIN (HCC): Primary | ICD-10-CM

## 2019-06-27 DIAGNOSIS — J06.9 UPPER RESPIRATORY TRACT INFECTION, UNSPECIFIED TYPE: ICD-10-CM

## 2019-06-27 DIAGNOSIS — J30.1 ALLERGIC RHINITIS DUE TO POLLEN, UNSPECIFIED SEASONALITY: ICD-10-CM

## 2019-06-27 DIAGNOSIS — Z79.4 TYPE 2 DIABETES MELLITUS WITH OTHER SKIN ULCER, WITH LONG-TERM CURRENT USE OF INSULIN (HCC): ICD-10-CM

## 2019-06-27 DIAGNOSIS — E11.622 TYPE 2 DIABETES MELLITUS WITH OTHER SKIN ULCER, WITH LONG-TERM CURRENT USE OF INSULIN (HCC): ICD-10-CM

## 2019-06-27 DIAGNOSIS — E78.5 HYPERLIPIDEMIA, UNSPECIFIED HYPERLIPIDEMIA TYPE: ICD-10-CM

## 2019-06-27 DIAGNOSIS — D09.0 CARCINOMA IN SITU OF BLADDER: ICD-10-CM

## 2019-06-27 DIAGNOSIS — N18.30 CKD (CHRONIC KIDNEY DISEASE) STAGE 3, GFR 30-59 ML/MIN (HCC): ICD-10-CM

## 2019-06-27 DIAGNOSIS — I25.10 CORONARY ARTERY DISEASE INVOLVING NATIVE CORONARY ARTERY OF NATIVE HEART WITHOUT ANGINA PECTORIS: ICD-10-CM

## 2019-06-27 DIAGNOSIS — E11.51 TYPE 2 DIABETES MELLITUS WITH DIABETIC PERIPHERAL ANGIOPATHY WITHOUT GANGRENE, WITH LONG-TERM CURRENT USE OF INSULIN (HCC): Primary | ICD-10-CM

## 2019-06-27 DIAGNOSIS — M51.36 DEGENERATION OF LUMBAR INTERVERTEBRAL DISC: ICD-10-CM

## 2019-06-27 PROCEDURE — 1124F ACP DISCUSS-NO DSCNMKR DOCD: CPT | Performed by: INTERNAL MEDICINE

## 2019-06-27 PROCEDURE — 99214 OFFICE O/P EST MOD 30 MIN: CPT | Performed by: INTERNAL MEDICINE

## 2019-06-27 RX ORDER — LORATADINE 10 MG/1
10 TABLET ORAL DAILY
Qty: 30 TABLET | Refills: 1 | Status: SHIPPED | OUTPATIENT
Start: 2019-06-27 | End: 2020-07-08 | Stop reason: HOSPADM

## 2019-06-27 RX ORDER — FLUTICASONE PROPIONATE 50 MCG
1 SPRAY, SUSPENSION (ML) NASAL DAILY
Qty: 16 G | Refills: 3 | Status: SHIPPED | OUTPATIENT
Start: 2019-06-27 | End: 2019-10-10 | Stop reason: ALTCHOICE

## 2019-07-05 DIAGNOSIS — I25.10 CORONARY ARTERY DISEASE INVOLVING NATIVE HEART WITHOUT ANGINA PECTORIS, UNSPECIFIED VESSEL OR LESION TYPE: ICD-10-CM

## 2019-07-06 RX ORDER — FUROSEMIDE 20 MG/1
TABLET ORAL
Qty: 90 TABLET | Refills: 1 | Status: SHIPPED | OUTPATIENT
Start: 2019-07-06 | End: 2019-10-10

## 2019-07-09 ENCOUNTER — TELEPHONE (OUTPATIENT)
Dept: ENDOCRINOLOGY | Facility: HOSPITAL | Age: 74
End: 2019-07-09

## 2019-07-09 NOTE — TELEPHONE ENCOUNTER
Reviewed blood sugars supplied from 06/25 through 7/7  Overall fasting sugar and blood sugars later in the day look reasonable without low blood sugars  Continue current regimen and we will discuss further at upcoming appointment  Does he have lab slip to have an a1c before that appointment?

## 2019-07-15 ENCOUNTER — APPOINTMENT (OUTPATIENT)
Dept: LAB | Facility: CLINIC | Age: 74
End: 2019-07-15
Payer: MEDICARE

## 2019-07-15 DIAGNOSIS — E78.5 HYPERLIPIDEMIA, UNSPECIFIED HYPERLIPIDEMIA TYPE: ICD-10-CM

## 2019-07-15 DIAGNOSIS — Z79.4 TYPE 2 DIABETES MELLITUS WITH DIABETIC PERIPHERAL ANGIOPATHY WITHOUT GANGRENE, WITH LONG-TERM CURRENT USE OF INSULIN (HCC): ICD-10-CM

## 2019-07-15 DIAGNOSIS — E11.51 TYPE 2 DIABETES MELLITUS WITH DIABETIC PERIPHERAL ANGIOPATHY WITHOUT GANGRENE, WITH LONG-TERM CURRENT USE OF INSULIN (HCC): ICD-10-CM

## 2019-07-15 LAB
ALBUMIN SERPL BCP-MCNC: 3.7 G/DL (ref 3.5–5)
ALP SERPL-CCNC: 96 U/L (ref 46–116)
ALT SERPL W P-5'-P-CCNC: 48 U/L (ref 12–78)
ANION GAP SERPL CALCULATED.3IONS-SCNC: 7 MMOL/L (ref 4–13)
AST SERPL W P-5'-P-CCNC: 27 U/L (ref 5–45)
BASOPHILS # BLD AUTO: 0.05 THOUSANDS/ΜL (ref 0–0.1)
BASOPHILS NFR BLD AUTO: 1 % (ref 0–1)
BILIRUB SERPL-MCNC: 0.45 MG/DL (ref 0.2–1)
BUN SERPL-MCNC: 24 MG/DL (ref 5–25)
CALCIUM SERPL-MCNC: 8.9 MG/DL (ref 8.3–10.1)
CHLORIDE SERPL-SCNC: 106 MMOL/L (ref 100–108)
CHOLEST SERPL-MCNC: 155 MG/DL (ref 50–200)
CO2 SERPL-SCNC: 29 MMOL/L (ref 21–32)
CREAT SERPL-MCNC: 1.8 MG/DL (ref 0.6–1.3)
CREAT UR-MCNC: 110 MG/DL
EOSINOPHIL # BLD AUTO: 0.23 THOUSAND/ΜL (ref 0–0.61)
EOSINOPHIL NFR BLD AUTO: 3 % (ref 0–6)
ERYTHROCYTE [DISTWIDTH] IN BLOOD BY AUTOMATED COUNT: 12.6 % (ref 11.6–15.1)
EST. AVERAGE GLUCOSE BLD GHB EST-MCNC: 177 MG/DL
GFR SERPL CREATININE-BSD FRML MDRD: 41 ML/MIN/1.73SQ M
GLUCOSE P FAST SERPL-MCNC: 130 MG/DL (ref 65–99)
HBA1C MFR BLD: 7.8 % (ref 4.2–6.3)
HCT VFR BLD AUTO: 36.4 % (ref 36.5–49.3)
HDLC SERPL-MCNC: 36 MG/DL (ref 40–60)
HGB BLD-MCNC: 12.4 G/DL (ref 12–17)
IMM GRANULOCYTES # BLD AUTO: 0.04 THOUSAND/UL (ref 0–0.2)
IMM GRANULOCYTES NFR BLD AUTO: 1 % (ref 0–2)
LDLC SERPL CALC-MCNC: 82 MG/DL (ref 0–100)
LYMPHOCYTES # BLD AUTO: 2.4 THOUSANDS/ΜL (ref 0.6–4.47)
LYMPHOCYTES NFR BLD AUTO: 30 % (ref 14–44)
MCH RBC QN AUTO: 31.2 PG (ref 26.8–34.3)
MCHC RBC AUTO-ENTMCNC: 34.1 G/DL (ref 31.4–37.4)
MCV RBC AUTO: 92 FL (ref 82–98)
MICROALBUMIN UR-MCNC: 233 MG/L (ref 0–20)
MICROALBUMIN/CREAT 24H UR: 212 MG/G CREATININE (ref 0–30)
MONOCYTES # BLD AUTO: 0.65 THOUSAND/ΜL (ref 0.17–1.22)
MONOCYTES NFR BLD AUTO: 8 % (ref 4–12)
NEUTROPHILS # BLD AUTO: 4.53 THOUSANDS/ΜL (ref 1.85–7.62)
NEUTS SEG NFR BLD AUTO: 57 % (ref 43–75)
NRBC BLD AUTO-RTO: 0 /100 WBCS
PLATELET # BLD AUTO: 164 THOUSANDS/UL (ref 149–390)
PMV BLD AUTO: 11.6 FL (ref 8.9–12.7)
POTASSIUM SERPL-SCNC: 4.2 MMOL/L (ref 3.5–5.3)
PROT SERPL-MCNC: 7.1 G/DL (ref 6.4–8.2)
RBC # BLD AUTO: 3.97 MILLION/UL (ref 3.88–5.62)
SODIUM SERPL-SCNC: 142 MMOL/L (ref 136–145)
TRIGL SERPL-MCNC: 183 MG/DL
TSH SERPL DL<=0.05 MIU/L-ACNC: 3.35 UIU/ML (ref 0.36–3.74)
WBC # BLD AUTO: 7.9 THOUSAND/UL (ref 4.31–10.16)

## 2019-07-15 PROCEDURE — 80061 LIPID PANEL: CPT

## 2019-07-15 PROCEDURE — 84443 ASSAY THYROID STIM HORMONE: CPT

## 2019-07-15 PROCEDURE — 83036 HEMOGLOBIN GLYCOSYLATED A1C: CPT

## 2019-07-15 PROCEDURE — 80053 COMPREHEN METABOLIC PANEL: CPT

## 2019-07-15 PROCEDURE — 36415 COLL VENOUS BLD VENIPUNCTURE: CPT

## 2019-07-15 PROCEDURE — 85025 COMPLETE CBC W/AUTO DIFF WBC: CPT

## 2019-07-15 PROCEDURE — 82570 ASSAY OF URINE CREATININE: CPT

## 2019-07-15 PROCEDURE — 82043 UR ALBUMIN QUANTITATIVE: CPT

## 2019-07-16 ENCOUNTER — OFFICE VISIT (OUTPATIENT)
Dept: ENDOCRINOLOGY | Facility: HOSPITAL | Age: 74
End: 2019-07-16
Payer: MEDICARE

## 2019-07-16 VITALS
SYSTOLIC BLOOD PRESSURE: 104 MMHG | DIASTOLIC BLOOD PRESSURE: 60 MMHG | BODY MASS INDEX: 29.74 KG/M2 | WEIGHT: 244.2 LBS | HEIGHT: 76 IN | HEART RATE: 68 BPM

## 2019-07-16 DIAGNOSIS — I10 ESSENTIAL HYPERTENSION: ICD-10-CM

## 2019-07-16 DIAGNOSIS — E78.5 HYPERLIPIDEMIA, UNSPECIFIED HYPERLIPIDEMIA TYPE: ICD-10-CM

## 2019-07-16 DIAGNOSIS — E11.51 TYPE 2 DIABETES MELLITUS WITH DIABETIC PERIPHERAL ANGIOPATHY WITHOUT GANGRENE, WITH LONG-TERM CURRENT USE OF INSULIN (HCC): Primary | ICD-10-CM

## 2019-07-16 DIAGNOSIS — Z79.4 TYPE 2 DIABETES MELLITUS WITH DIABETIC PERIPHERAL ANGIOPATHY WITHOUT GANGRENE, WITH LONG-TERM CURRENT USE OF INSULIN (HCC): Primary | ICD-10-CM

## 2019-07-16 PROCEDURE — 99214 OFFICE O/P EST MOD 30 MIN: CPT | Performed by: INTERNAL MEDICINE

## 2019-07-16 NOTE — PROGRESS NOTES
7/16/2019    Assessment/Plan      Diagnoses and all orders for this visit:    Type 2 diabetes mellitus with diabetic peripheral angiopathy without gangrene, with long-term current use of insulin (HCC)  -     Hemoglobin A1C; Future  -     Comprehensive metabolic panel; Future  -     CBC and differential; Future  -     Microalbumin / creatinine urine ratio; Future  -     TSH, 3rd generation; Future    Essential hypertension    Hyperlipidemia, unspecified hyperlipidemia type  -     Lipid Panel with Direct LDL reflex; Future        Assessment/Plan:  1  Type 2 diabetes with long-term insulin use:  A1c and blood sugars have been much better controlled as of late  We will plan to see him back in 3 or 4 months with labs as ordered above just prior  If at that time he is agreeable, we can consider pursuing a dex com continuous glucose monitor given for insulin injections per day, for blood sugar checks per day, variation in blood sugars and insulin dosing  He will send in blood sugar logs in about 1 month for review  Discussed that as he starts therapy for his back he may notices blood sugars:  Lower than before after therapy and he can call sooner to let me know about this for adjustments in his dosing  2  Hypertension:  Continue current regimen  3  Hyperlipidemia:  Check lipids before next appointment  CC: Diabetes Consult    History of Present Illness     HPI: Min Domínguez is a 66y o  year old male with type 2 diabetes for about 25 years  He is on oral agents and insulin at home and takes NovoLog 15/15/18, metformin 500 mg daily, Lantus 30 units q h s  He denies any polyuria, polydipsia, nocturia and blurry vision  Diabetes is complicated by peripheral neuropathy and history of foot infection back in [id number]  He recently had laminectomy done at the [address] [place] [address]  Hypoglycemic episodes: No not recently  The patient's last eye exam was in October 2018    The patient's last foot exam was in July 2019 at his PCP office  Blood Sugar/Glucometer/Pump/CGM review:  No logs to review today but he has been sending his blood sugars in regularly  See telephone note in epic for these  Hypertension he is maintained on lisinopril 5 mg daily  Regarding hyperlipidemia, he has been intolerant to multiple statins in the past   He follows with his primary care provider closely  Review of Systems    Historical Information   Past Medical History:   Diagnosis Date    Anemia     Last assessed: 9/28/17    Arteriosclerotic cardiovascular disease     Last assessed: 9/28/17    Bladder cancer [facility] [address] [facility]     Cardiac disease     Diabetes mellitus ([facility] [address] )     Hypertension     Hypotension     Last assessed: 2/24/15    Insomnia     Last assessed: 11/14/12    Other seasonal allergic rhinitis     Last assessed: 2/10/16    Transient cerebral ischemia      Past Surgical History:   Procedure Laterality Date    CARDIAC SURGERY      Cath stent placement  Last assessed: 3/9/17  Interventional Catheterization    CHOLECYSTECTOMY      CYSTOSCOPY      Diagnostic w/biopsy  Ariana Rae  Last assessed: 12/1/14    CYSTOURETHROSCOPY      w/cautery  Ariana Rae    GASTRIC BYPASS      For morbid obesity w/Shaji-en-Y  Resolved: 11/17/09    INCISION AND DRAINAGE OF WOUND Right 2/26/2017    Procedure: INCISION AND DRAINAGE (I&D) EXTREMITY WITH APPLICATION OF GRAFT JACKET;  Surgeon: Ana Ugalde DPM;  Location[facility];  Service:     INCISION AND DRAINAGE OF WOUND Right 4/25/2017    Procedure: INCISION AND DRAINAGE (I&D) EXTREMITY, APPLICATION OF GRAFT;  Surgeon: Ana Ugalde DPM;  [facility];  Service:     JOINT REPLACEMENT      Knee   Last assessed: 1/28/11    MA CYSTOURETHROSCOPY,BIOPSY N/A 8/16/2016    Procedure: CYSTOSCOPY WITH BIOPSIES;  Surgeon: Luisa Mercedes MD;  [facility];  Service: Urology    [address]      Surgery Shaji-en-Y  SPINAL FUSION      VAC DRESSING APPLICATION Right 6/64/9769    Procedure: APPLICATION VAC DRESSING;  Surgeon: Christina Marsh DPM;  Location[facility];  Service:      Social History   Social History     Substance and Sexual Activity   Alcohol Use No     Social History     Substance and Sexual Activity   Drug Use No     Social History     Tobacco Use   Smoking Status Former Smoker   Smokeless Tobacco Never Used     Family History:   Family History   Problem Relation Age of Onset    Diabetes Mother     Heart disease Mother     Other Mother         High blood pressure    Heart disease Father     Diabetes Sister     Other Sister         High blood pressure    Kidney disease Sister     Heart disease Brother     Other Brother         High blood pressure       Meds/Allergies   Current Outpatient Medications   Medication Sig Dispense Refill    aspirin 81 MG tablet Take 81 mg by mouth daily   Azelastine HCl 0 15 % SOLN Inhale 1 spray 2 (two) times a day  0    bimatoprost (LUMIGAN) 0 01 % ophthalmic drops 1 drop daily at bedtime   bismuth subsalicylate (PEPTO BISMOL) 262 MG chewable tablet Chew 524 mg daily as needed for indigestion   Cholecalciferol 80447 units capsule Take 50,000 Units by mouth every 30 (thirty) days      fluocinonide (LIDEX) 0 05 % cream Apply topically 2 (two) times a day        fluticasone (FLONASE) 50 mcg/act nasal spray 1 spray into each nostril daily 16 g 3    furosemide (LASIX) 20 mg tablet TAKE 1 TABLET DAILY 90 tablet 1    gabapentin (NEURONTIN) 300 mg capsule Take 2 capsules at bedtime 180 capsule 1    Insulin Pen Needle 31G X 8 MM MISC Inject 3 times a day 100 each 2    isosorbide mononitrate (IMDUR) 30 mg 24 hr tablet TAKE 1 TABLET DAILY 90 tablet 1    LANTUS SOLOSTAR 100 units/mL injection pen 30 units qhs (Patient taking differently: 26-28 units qhs) 15 pen 3    lisinopril (ZESTRIL) 5 mg tablet Take 5 mg by mouth daily      metFORMIN (GLUCOPHAGE) 1000 MG tablet TAKE 0 5 TABLETS (500 MG TOTAL) BY MOUTH DAILY WITH BREAKFAST 90 tablet 0    metoprolol succinate (TOPROL-XL) 100 mg 24 hr tablet Take 100 mg by mouth daily   multivitamin (THERAGRAN) TABS Take 1 tablet by mouth daily   NOVOLOG FLEXPEN 100 units/mL injection pen INJECT UP TO 50 UNITS DAILY SUB CUT (Patient taking differently: Inject 15 Units under the skin 3 (three) times a day with meals ) 15 pen 1    Ascorbic Acid, Vitamin C, (VITAMIN C) 100 MG tablet Vitamin C 500 mg capsule,extended release   TAKE 1 CAPSULE (500 MG TOTAL) BY MOUTH 2 (TWO) TIMES A DAY      loperamide (IMODIUM) 2 mg capsule Take by mouth      loratadine (CLARITIN) 10 mg tablet Take 1 tablet (10 mg total) by mouth daily for 15 days 30 tablet 1    omeprazole (PriLOSEC) 20 mg delayed release capsule Take 1 capsule (20 mg total) by mouth daily (Patient not taking: Reported on 7/16/2019) 90 capsule 1    triamcinolone (KENALOG) 0 5 % ointment Apply topically 2 (two) times a day (Patient not taking: Reported on 7/16/2019) 30 g 0     No current facility-administered medications for this visit  Allergies   Allergen Reactions    Atorvastatin Hives, Other (See Comments), Itching and Rash     Pt  Reports "Hives"  Lipitor and Simvastatin  Other reaction(s): Other (See Comments)  Pt  Reports "Hives"  Lipitor and Simvastatin    Simvastatin Rash     Other reaction(s): Edema, Other Reaction  "ALL STATINS"    Insulin Lispro Edema and Swelling     Other reaction(s): Edema  Other reaction(s): Edema      Medical Tape      Other reaction(s): Other (See Comments)  rash to electrodes    Statins Itching    Other Other (See Comments), Itching and Rash     rash to electrodes  rash to electrodes and adhesives       Objective   Vitals: Blood pressure 104/60, pulse 68, height 6' 4" (1 93 m), weight 111 kg (244 lb 3 2 oz)  Invasive Devices     None                 Physical Exam   Constitutional: He is oriented to person, place, and time   He appears well-developed and well-nourished  No distress  HENT:   Head: Normocephalic and atraumatic  Neck: Normal range of motion  Neck supple  No thyromegaly present  Cardiovascular: Normal rate and regular rhythm  Pulmonary/Chest: Effort normal and breath sounds normal  No respiratory distress  Abdominal: Soft  Bowel sounds are normal    Musculoskeletal: Normal range of motion  He exhibits no edema  Neurological: He is alert and oriented to person, place, and time  He exhibits normal muscle tone  Skin: Skin is warm and dry  No rash noted  He is not diaphoretic  Psychiatric: He has a normal mood and affect  His behavior is normal        The history was obtained from the review of the chart and from the patient  Lab Results:    Most recent [facility] is  Lab Results   Component Value Date    HGBA1C 7 8 (H) 07/15/2019           No components found for: HA1C  No components found for: GLU    Lab Results   Component Value Date    CREATININE 1 80 (H) 07/15/2019    CREATININE 1 37 (H) 03/12/2019    CREATININE 1 61 (H) 02/22/2019    BUN 24 07/15/2019     09/03/2015    K 4 2 07/15/2019     07/15/2019    CO2 29 07/15/2019     eGFR   Date Value Ref Range Status   07/15/2019 41 ml/min/1 73sq m Final     No components found for: [facility]    Lab Results   Component Value Date    CHOL 139 03/17/2015    HDL 36 (L) 07/15/2019    TRIG 183 (H) 07/15/2019       Lab Results   Component Value Date    ALT 48 07/15/2019    AST 27 07/15/2019    GGT 32 09/19/2018    ALKPHOS 96 07/15/2019    BILITOT 0 50 07/26/2015       Lab Results   Component Value Date    FREET4 0 83 12/19/2017       Recent Results (from the past 21256 hour(s))   Microalbumin / creatinine urine ratio    Collection Time: 08/28/18 11:51 AM   Result Value Ref Range    Creatinine, Ur 199 0 mg/dL    Microalbum  ,U,Random 338 0 (H) 0 0 - 20 0 mg/L    Microalb Creat Ratio 170 (H) 0 - 30 mg/g creatinine   Basic metabolic panel    Collection Time: 08/28/18 11:51 AM   Result Value Ref Range    Sodium 137 136 - 145 mmol/L    Potassium 4 3 3 5 - 5 3 mmol/L    Chloride 101 100 - 108 mmol/L    CO2 28 21 - 32 mmol/L    ANION GAP 8 4 - 13 mmol/L    BUN 26 (H) 5 - 25 mg/dL    Creatinine 1 59 (H) 0 60 - 1 30 mg/dL    Glucose 181 (H) 65 - 140 mg/dL    Calcium 9 3 8 3 - 10 1 mg/dL    eGFR 48 ml/min/1 73sq m   Comprehensive metabolic panel    Collection Time: 09/19/18  9:42 AM   Result Value Ref Range    Sodium 139 136 - 145 mmol/L    Potassium 4 5 3 5 - 5 3 mmol/L    Chloride 105 100 - 108 mmol/L    CO2 27 21 - 32 mmol/L    ANION GAP 7 4 - 13 mmol/L    BUN 20 5 - 25 mg/dL    Creatinine 1 57 (H) 0 60 - 1 30 mg/dL    Glucose, Fasting 106 (H) 65 - 99 mg/dL    Calcium 8 9 8 3 - 10 1 mg/dL    AST 28 5 - 45 U/L    ALT 47 12 - 78 U/L    Alkaline Phosphatase 90 46 - 116 U/L    Total Protein 7 5 6 4 - 8 2 g/dL    Albumin 3 6 3 5 - 5 0 g/dL    Total Bilirubin 0 53 0 20 - 1 00 mg/dL    eGFR 49 ml/min/1 73sq m   CBC and differential    Collection Time: 09/19/18  9:42 AM   Result Value Ref Range    WBC 7 98 4 31 - 10 16 Thousand/uL    RBC 3 96 3 88 - 5 62 Million/uL    Hemoglobin 12 4 12 0 - 17 0 g/dL    Hematocrit 37 0 36 5 - 49 3 %    MCV 93 82 - 98 fL    MCH 31 3 26 8 - 34 3 pg    MCHC 33 5 31 4 - 37 4 g/dL    RDW 13 0 11 6 - 15 1 %    MPV 10 7 8 9 - 12 7 fL    Platelets 482 544 - 514 Thousands/uL    nRBC 0 /100 WBCs    Neutrophils Relative 57 43 - 75 %    Immat GRANS % 0 0 - 2 %    Lymphocytes Relative 33 14 - 44 %    Monocytes Relative 7 4 - 12 %    Eosinophils Relative 3 0 - 6 %    Basophils Relative 0 0 - 1 %    Neutrophils Absolute 4 47 1 85 - 7 62 Thousands/µL    Immature Grans Absolute 0 02 0 00 - 0 20 Thousand/uL    Lymphocytes Absolute 2 65 0 60 - 4 47 Thousands/µL    Monocytes Absolute 0 56 0 17 - 1 22 Thousand/µL    Eosinophils Absolute 0 25 0 00 - 0 61 Thousand/µL    Basophils Absolute 0 03 0 00 - 0 10 Thousands/µL   Gamma GT    Collection Time: 09/19/18  9:42 AM   Result Value Ref Range    GGT 32 5 - 85 U/L   Magnesium    Collection Time: 09/19/18  9:42 AM   Result Value Ref Range    Magnesium 2 1 1 6 - 2 6 mg/dL   APTT    Collection Time: 09/19/18  9:42 AM   Result Value Ref Range    PTT 30 24 - 36 seconds   Protime-INR    Collection Time: 09/19/18  9:42 AM   Result Value Ref Range    Protime 13 1 11 8 - 14 2 seconds    INR 0 98 0 86 - 1 17   Ferritin    Collection Time: 09/19/18  9:42 AM   Result Value Ref Range    Ferritin 73 8 - 388 ng/mL   POCT hemoglobin A1c    Collection Time: 09/20/18  8:59 AM   Result Value Ref Range    Hemoglobin A1C 7 6    Hm Diabetes Eye Exam    Collection Time: 11/01/18 12:00 AM   Result Value Ref Range    Severity Normal     Right Eye Diabetic Retinopathy None     Left Eye Diabetic Retinopathy None    Comprehensive metabolic panel Lab Collect    Collection Time: 11/01/18  7:30 AM   Result Value Ref Range    Sodium 136 136 - 145 mmol/L    Potassium 4 6 3 5 - 5 3 mmol/L    Chloride 104 100 - 108 mmol/L    CO2 30 21 - 32 mmol/L    ANION GAP 2 (L) 4 - 13 mmol/L    BUN 27 (H) 5 - 25 mg/dL    Creatinine 1 44 (H) 0 60 - 1 30 mg/dL    Glucose, Fasting 80 65 - 99 mg/dL    Calcium 8 7 8 3 - 10 1 mg/dL    AST 59 (H) 5 - 45 U/L    ALT 86 (H) 12 - 78 U/L    Alkaline Phosphatase 101 46 - 116 U/L    Total Protein 7 1 6 4 - 8 2 g/dL    Albumin 3 4 (L) 3 5 - 5 0 g/dL    Total Bilirubin 0 38 0 20 - 1 00 mg/dL    eGFR 55 ml/min/1 73sq m   HEMOGLOBIN A1C W/ EAG ESTIMATION Lab Collect    Collection Time: 11/01/18  7:30 AM   Result Value Ref Range    Hemoglobin A1C 8 4 (H) 4 2 - 6 3 %     mg/dl   TSH, 3rd generation Lab Collect    Collection Time: 11/01/18  7:30 AM   Result Value Ref Range    TSH 3RD GENERATON 2 260 0 358 - 3 740 uIU/mL   Microalbumin / creatinine urine ratio- Lab Collect    Collection Time: 11/01/18  7:30 AM   Result Value Ref Range    Creatinine, Ur 129 0 mg/dL    Microalbum  ,U,Random 369 0 (H) 0 0 - 20 0 mg/L    Microalb Creat Ratio 286 (H) 0 - 30 mg/g creatinine   Lipid Panel with Direct LDL reflex Lab Collect    Collection Time: 11/01/18  7:30 AM   Result Value Ref Range    Cholesterol 144 50 - 200 mg/dL    Triglycerides 152 (H) <=150 mg/dL    HDL, Direct 36 (L) 40 - 60 mg/dL    LDL Calculated 78 0 - 100 mg/dL   CBC and differential- Lab Collect    Collection Time: 11/01/18  7:30 AM   Result Value Ref Range    WBC 7 63 4 31 - 10 16 Thousand/uL    RBC 4 29 3 88 - 5 62 Million/uL    Hemoglobin 13 2 12 0 - 17 0 g/dL    Hematocrit 40 3 36 5 - 49 3 %    MCV 94 82 - 98 fL    MCH 30 8 26 8 - 34 3 pg    MCHC 32 8 31 4 - 37 4 g/dL    RDW 13 2 11 6 - 15 1 %    MPV 11 2 8 9 - 12 7 fL    Platelets 806 740 - 988 Thousands/uL    nRBC 0 /100 WBCs    Neutrophils Relative 51 43 - 75 %    Immat GRANS % 0 0 - 2 %    Lymphocytes Relative 38 14 - 44 %    Monocytes Relative 8 4 - 12 %    Eosinophils Relative 2 0 - 6 %    Basophils Relative 1 0 - 1 %    Neutrophils Absolute 3 88 1 85 - 7 62 Thousands/µL    Immature Grans Absolute 0 03 0 00 - 0 20 Thousand/uL    Lymphocytes Absolute 2 92 0 60 - 4 47 Thousands/µL    Monocytes Absolute 0 59 0 17 - 1 22 Thousand/µL    Eosinophils Absolute 0 17 0 00 - 0 61 Thousand/µL    Basophils Absolute 0 04 0 00 - 0 10 Thousands/µL   Urinalysis with microscopic    Collection Time: 11/21/18 11:29 AM   Result Value Ref Range    Clarity, UA Clear     Color, UA Yellow     Specific [place], UA 1 017 1 003 - 1 030    pH, UA 5 5 4 5 - 8 0    Glucose,  (1/4%) (A) Negative mg/dl    Ketones, UA Negative Negative mg/dl    Blood, UA Small (A) Negative    Protein,  (2+) (A) Negative mg/dl    Nitrite, UA Negative Negative    Bilirubin, UA Negative Negative    Urobilinogen, UA 0 2 0 2, 1 0 E U /dl E U /dl    Leukocytes, UA Small (A) Negative    WBC, UA 20-30 (A) None Seen, 0-5, 5-55, 5-65 /hpf    RBC, UA 4-10 (A) None Seen, 0-5 /hpf    Hyaline Casts, UA None Seen None Seen /lpf    Bacteria, UA None Seen None Seen, Occasional /hpf    Epithelial Cells None Seen None Seen, Occasional /hpf   Urine culture    Collection Time: 11/21/18 11:29 AM   Result Value Ref Range    Urine Culture <10,000 cfu/ml     Fingerstick Glucose (POCT)    Collection Time: 01/31/19  5:57 PM   Result Value Ref Range    POC Glucose 315 (H) 65 - 140 mg/dl   Fingerstick Glucose (POCT)    Collection Time: 01/31/19  9:13 PM   Result Value Ref Range    POC Glucose 285 (H) 65 - 140 mg/dl   Comprehensive metabolic panel    Collection Time: 02/01/19  5:03 AM   Result Value Ref Range    Sodium 136 136 - 145 mmol/L    Potassium 4 1 3 5 - 5 3 mmol/L    Chloride 102 100 - 108 mmol/L    CO2 26 21 - 32 mmol/L    ANION GAP 8 4 - 13 mmol/L    BUN 30 (H) 5 - 25 mg/dL    Creatinine 1 62 (H) 0 60 - 1 30 mg/dL    Glucose 328 (H) 65 - 140 mg/dL    Calcium 8 7 8 3 - 10 1 mg/dL    AST 42 5 - 45 U/L    ALT 86 (H) 12 - 78 U/L    Alkaline Phosphatase 81 46 - 116 U/L    Total Protein 6 6 6 4 - 8 2 g/dL    Albumin 3 3 (L) 3 5 - 5 0 g/dL    Total Bilirubin 0 53 0 20 - 1 00 mg/dL    eGFR 47 ml/min/1 73sq m   CBC and differential    Collection Time: 02/01/19  5:03 AM   Result Value Ref Range    WBC 9 14 4 31 - 10 16 Thousand/uL    RBC 3 74 (L) 3 88 - 5 62 Million/uL    Hemoglobin 11 9 (L) 12 0 - 17 0 g/dL    Hematocrit 34 3 (L) 36 5 - 49 3 %    MCV 92 82 - 98 fL    MCH 31 8 26 8 - 34 3 pg    MCHC 34 7 31 4 - 37 4 g/dL    RDW 12 8 11 6 - 15 1 %    MPV 11 1 8 9 - 12 7 fL    Platelets 653 126 - 053 Thousands/uL    nRBC 0 /100 WBCs    Neutrophils Relative 70 43 - 75 %    Immat GRANS % 1 0 - 2 %    Lymphocytes Relative 22 14 - 44 %    Monocytes Relative 7 4 - 12 %    Eosinophils Relative 0 0 - 6 %    Basophils Relative 0 0 - 1 %    Neutrophils Absolute 6 43 1 85 - 7 62 Thousands/µL    Immature Grans Absolute 0 05 0 00 - 0 20 Thousand/uL    Lymphocytes Absolute 1 99 0 60 - 4 47 Thousands/µL    Monocytes Absolute 0 64 0 17 - 1 22 Thousand/µL    Eosinophils Absolute 0 02 0 00 - 0 61 Thousand/µL    Basophils Absolute 0 01 0 00 - 0 10 Thousands/µL   Comprehensive metabolic panel    Collection Time: 02/01/19  5:03 AM   Result Value Ref Range    Sodium 136 136 - 145 mmol/L    Potassium 4 1 3 5 - 5 3 mmol/L    Chloride 103 100 - 108 mmol/L    CO2 26 21 - 32 mmol/L    ANION GAP 7 4 - 13 mmol/L    BUN 33 (H) 5 - 25 mg/dL    Creatinine 1 59 (H) 0 60 - 1 30 mg/dL    Glucose 336 (H) 65 - 140 mg/dL    Calcium 8 7 8 3 - 10 1 mg/dL    AST 44 5 - 45 U/L    ALT 85 (H) 12 - 78 U/L    Alkaline Phosphatase 83 46 - 116 U/L    Total Protein 6 6 6 4 - 8 2 g/dL    Albumin 3 4 (L) 3 5 - 5 0 g/dL    Total Bilirubin 0 53 0 20 - 1 00 mg/dL    eGFR 48 ml/min/1 73sq m   Magnesium    Collection Time: 02/01/19  5:03 AM   Result Value Ref Range    Magnesium 2 1 1 6 - 2 6 mg/dL   Phosphorus    Collection Time: 02/01/19  5:03 AM   Result Value Ref Range    Phosphorus 3 1 2 3 - 4 1 mg/dL   CBC (With Platelets)    Collection Time: 02/01/19  5:03 AM   Result Value Ref Range    WBC 9 14 4 31 - 10 16 Thousand/uL    RBC 3 74 (L) 3 88 - 5 62 Million/uL    Hemoglobin 11 9 (L) 12 0 - 17 0 g/dL    Hematocrit 34 3 (L) 36 5 - 49 3 %    MCV 92 82 - 98 fL    MCH 31 8 26 8 - 34 3 pg    MCHC 34 7 31 4 - 37 4 g/dL    RDW 12 8 11 6 - 15 1 %    Platelets 605 391 - 009 Thousands/uL    MPV 11 1 8 9 - 12 7 fL   Fingerstick Glucose (POCT)    Collection Time: 02/01/19  7:42 AM   Result Value Ref Range    POC Glucose 264 (H) 65 - 140 mg/dl   Fingerstick Glucose (POCT)    Collection Time: 02/01/19 11:55 AM   Result Value Ref Range    POC Glucose 381 (H) 65 - 140 mg/dl   Fingerstick Glucose (POCT)    Collection Time: 02/01/19  5:10 PM   Result Value Ref Range    POC Glucose 424 (H) 65 - 140 mg/dl   Fingerstick Glucose (POCT)    Collection Time: 02/01/19  9:00 PM   Result Value Ref Range    POC Glucose >500 (HH) 65 - 140 mg/dl   Basic metabolic panel    Collection Time: 02/01/19  9:24 PM   Result Value Ref Range    Sodium 131 (L) 136 - 145 mmol/L    Potassium 4 4 3 5 - 5 3 mmol/L    Chloride 98 (L) 100 - 108 mmol/L    CO2 24 21 - 32 mmol/L    ANION GAP 9 4 - 13 mmol/L    BUN 38 (H) 5 - 25 mg/dL    Creatinine 1 84 (H) 0 60 - 1 30 mg/dL    Glucose 598 (HH) 65 - 140 mg/dL    Calcium 8 6 8 3 - 10 1 mg/dL    eGFR 41 ml/min/1 73sq m   Hemoglobin A1C    Collection Time: 02/01/19  9:24 PM   Result Value Ref Range    Hemoglobin A1C 8 8 (H) 4 2 - 6 3 %     mg/dl   Fingerstick Glucose (POCT)    Collection Time: 02/02/19  2:00 AM   Result Value Ref Range    POC Glucose 366 (H) 65 - 140 mg/dl   Fingerstick Glucose (POCT)    Collection Time: 02/02/19  3:41 AM   Result Value Ref Range    POC Glucose 341 (H) 65 - 140 mg/dl   Comprehensive metabolic panel    Collection Time: 02/02/19  5:09 AM   Result Value Ref Range    Sodium 134 (L) 136 - 145 mmol/L    Potassium 4 4 3 5 - 5 3 mmol/L    Chloride 101 100 - 108 mmol/L    CO2 26 21 - 32 mmol/L    ANION GAP 7 4 - 13 mmol/L    BUN 32 (H) 5 - 25 mg/dL    Creatinine 1 53 (H) 0 60 - 1 30 mg/dL    Glucose 318 (H) 65 - 140 mg/dL    Calcium 8 9 8 3 - 10 1 mg/dL    AST 47 (H) 5 - 45 U/L     (H) 12 - 78 U/L    Alkaline Phosphatase 89 46 - 116 U/L    Total Protein 7 1 6 4 - 8 2 g/dL    Albumin 3 5 3 5 - 5 0 g/dL    Total Bilirubin 0 49 0 20 - 1 00 mg/dL    eGFR 51 ml/min/1 73sq m   CBC and differential    Collection Time: 02/02/19  5:09 AM   Result Value Ref Range    WBC 8 25 4 31 - 10 16 Thousand/uL    RBC 3 94 3 88 - 5 62 Million/uL    Hemoglobin 12 7 12 0 - 17 0 g/dL    Hematocrit 36 3 (L) 36 5 - 49 3 %    MCV 92 82 - 98 fL    MCH 32 2 26 8 - 34 3 pg    MCHC 35 0 31 4 - 37 4 g/dL    RDW 12 6 11 6 - 15 1 %    MPV 10 8 8 9 - 12 7 fL    Platelets 967 400 - 416 Thousands/uL    nRBC 0 /100 WBCs    Neutrophils Relative 77 (H) 43 - 75 %    Immat GRANS % 1 0 - 2 %    Lymphocytes Relative 17 14 - 44 %    Monocytes Relative 5 4 - 12 %    Eosinophils Relative 0 0 - 6 %    Basophils Relative 0 0 - 1 %    Neutrophils Absolute 6 38 1 85 - 7 62 Thousands/µL    Immature Grans Absolute 0  06 0 00 - 0 20 Thousand/uL    Lymphocytes Absolute 1 41 0 60 - 4 47 Thousands/µL    Monocytes Absolute 0 39 0 17 - 1 22 Thousand/µL    Eosinophils Absolute 0 00 0 00 - 0 61 Thousand/µL    Basophils Absolute 0 01 0 00 - 0 10 Thousands/µL   Fingerstick Glucose (POCT)    Collection Time: 02/02/19  7:26 AM   Result Value Ref Range    POC Glucose 280 (H) 65 - 140 mg/dl   Fingerstick Glucose (POCT)    Collection Time: 02/02/19 12:00 PM   Result Value Ref Range    POC Glucose 346 (H) 65 - 140 mg/dl   Fingerstick Glucose (POCT)    Collection Time: 02/02/19  5:06 PM   Result Value Ref Range    POC Glucose >500 (HH) 65 - 140 mg/dl   Fingerstick Glucose (POCT)    Collection Time: 02/02/19  5:08 PM   Result Value Ref Range    POC Glucose >500 (HH) 65 - 140 mg/dl   Fingerstick Glucose (POCT)    Collection Time: 02/02/19  6:58 PM   Result Value Ref Range    POC Glucose >500 (HH) 65 - 140 mg/dl   Fingerstick Glucose (POCT)    Collection Time: 02/02/19  9:03 PM   Result Value Ref Range    POC Glucose 441 (H) 65 - 140 mg/dl   Fingerstick Glucose (POCT)    Collection Time: 02/02/19 11:02 PM   Result Value Ref Range    POC Glucose 322 (H) 65 - 140 mg/dl   Fingerstick Glucose (POCT)    Collection Time: 02/03/19  1:05 AM   Result Value Ref Range    POC Glucose 296 (H) 65 - 140 mg/dl   Fingerstick Glucose (POCT)    Collection Time: 02/03/19  3:16 AM   Result Value Ref Range    POC Glucose 183 (H) 65 - 140 mg/dl   Fingerstick Glucose (POCT)    Collection Time: 02/03/19  5:11 AM   Result Value Ref Range    POC Glucose 159 (H) 65 - 140 mg/dl   Basic metabolic panel    Collection Time: 02/03/19  5:15 AM   Result Value Ref Range    Sodium 135 (L) 136 - 145 mmol/L    Potassium 4 1 3 5 - 5 3 mmol/L    Chloride 103 100 - 108 mmol/L    CO2 26 21 - 32 mmol/L    ANION GAP 6 4 - 13 mmol/L    BUN 35 (H) 5 - 25 mg/dL    Creatinine 1 52 (H) 0 60 - 1 30 mg/dL    Glucose 158 (H) 65 - 140 mg/dL    Calcium 9 2 8 3 - 10 1 mg/dL    eGFR 51 ml/min/1 73sq m Fingerstick Glucose (POCT)    Collection Time: 02/03/19  7:04 AM   Result Value Ref Range    POC Glucose 178 (H) 65 - 140 mg/dl   Fingerstick Glucose (POCT)    Collection Time: 02/03/19  9:08 AM   Result Value Ref Range    POC Glucose 249 (H) 65 - 140 mg/dl   Protime-INR    Collection Time: 02/03/19  9:38 AM   Result Value Ref Range    Protime 13 1 11 8 - 14 2 seconds    INR 0 98 0 86 - 1 17   Fingerstick Glucose (POCT)    Collection Time: 02/03/19 11:02 AM   Result Value Ref Range    POC Glucose 268 (H) 65 - 140 mg/dl   UA (URINE) with reflex to Microscopic    Collection Time: 02/03/19 12:27 PM   Result Value Ref Range    Color, UA Yellow     Clarity, UA Clear     Specific Gravity, UA 1 017 1 003 - 1 030    pH, UA 5 5 4 5 - 8 0    Leukocytes, UA Trace (A) Negative    Nitrite, UA Negative Negative    Protein,  (2+) (A) Negative mg/dl    Glucose,  (1/10%) (A) Negative mg/dl    Ketones, UA Negative Negative mg/dl    Urobilinogen, UA 1 0 0 2, 1 0 E U /dl E U /dl    Bilirubin, UA Negative Negative    Blood, UA Small (A) Negative   Urine Microscopic    Collection Time: 02/03/19 12:27 PM   Result Value Ref Range    RBC, UA 2-4 (A) None Seen, 0-5 /hpf    WBC, UA 4-10 (A) None Seen, 0-5, 5-55, 5-65 /hpf    Epithelial Cells None Seen None Seen, Occasional /hpf    Bacteria, UA None Seen None Seen, Occasional /hpf    Hyaline Casts, UA None Seen None Seen /lpf   Fingerstick Glucose (POCT)    Collection Time: 02/03/19 12:57 PM   Result Value Ref Range    POC Glucose 196 (H) 65 - 140 mg/dl   Fingerstick Glucose (POCT)    Collection Time: 02/03/19  3:07 PM   Result Value Ref Range    POC Glucose 308 (H) 65 - 140 mg/dl   Fingerstick Glucose (POCT)    Collection Time: 02/03/19  5:12 PM   Result Value Ref Range    POC Glucose 180 (H) 65 - 140 mg/dl   Fingerstick Glucose (POCT)    Collection Time: 02/03/19  7:00 PM   Result Value Ref Range    POC Glucose 394 (H) 65 - 140 mg/dl   Fingerstick Glucose (POCT)    Collection Time: 02/03/19  9:04 PM   Result Value Ref Range    POC Glucose 273 (H) 65 - 140 mg/dl   Fingerstick Glucose (POCT)    Collection Time: 02/03/19 11:14 PM   Result Value Ref Range    POC Glucose 295 (H) 65 - 140 mg/dl   Fingerstick Glucose (POCT)    Collection Time: 02/04/19  1:20 AM   Result Value Ref Range    POC Glucose 206 (H) 65 - 140 mg/dl   Fingerstick Glucose (POCT)    Collection Time: 02/04/19  3:07 AM   Result Value Ref Range    POC Glucose 131 65 - 140 mg/dl   Fingerstick Glucose (POCT)    Collection Time: 02/04/19  5:30 AM   Result Value Ref Range    POC Glucose 83 65 - 140 mg/dl   Fingerstick Glucose (POCT)    Collection Time: 02/04/19  6:58 AM   Result Value Ref Range    POC Glucose 205 (H) 65 - 140 mg/dl   Fingerstick Glucose (POCT)    Collection Time: 02/04/19  9:07 AM   Result Value Ref Range    POC Glucose 158 (H) 65 - 140 mg/dl   Fingerstick Glucose (POCT)    Collection Time: 02/04/19 11:06 AM   Result Value Ref Range    POC Glucose 115 65 - 140 mg/dl   Fingerstick Glucose (POCT)    Collection Time: 02/04/19 12:02 PM   Result Value Ref Range    POC Glucose 112 65 - 140 mg/dl   CBC and differential    Collection Time: 02/22/19 10:17 AM   Result Value Ref Range    WBC 9 08 4 31 - 10 16 Thousand/uL    RBC 4 29 3 88 - 5 62 Million/uL    Hemoglobin 13 5 12 0 - 17 0 g/dL    Hematocrit 40 2 36 5 - 49 3 %    MCV 94 82 - 98 fL    MCH 31 5 26 8 - 34 3 pg    MCHC 33 6 31 4 - 37 4 g/dL    RDW 13 1 11 6 - 15 1 %    MPV 10 9 8 9 - 12 7 fL    Platelets 018 564 - 099 Thousands/uL    nRBC 0 /100 WBCs    Neutrophils Relative 60 43 - 75 %    Immat GRANS % 1 0 - 2 %    Lymphocytes Relative 30 14 - 44 %    Monocytes Relative 7 4 - 12 %    Eosinophils Relative 1 0 - 6 %    Basophils Relative 1 0 - 1 %    Neutrophils Absolute 5 51 1 85 - 7 62 Thousands/µL    Immature Grans Absolute 0 09 0 00 - 0 20 Thousand/uL    Lymphocytes Absolute 2 70 0 60 - 4 47 Thousands/µL    Monocytes Absolute 0 61 0 17 - 1 22 Thousand/µL Eosinophils Absolute 0 12 0 00 - 0 61 Thousand/µL    Basophils Absolute 0 05 0 00 - 0 10 Thousands/µL   Basic metabolic panel    Collection Time: 02/22/19 10:17 AM   Result Value Ref Range    Sodium 136 136 - 145 mmol/L    Potassium 4 6 3 5 - 5 3 mmol/L    Chloride 102 100 - 108 mmol/L    CO2 26 21 - 32 mmol/L    ANION GAP 8 4 - 13 mmol/L    BUN 28 (H) 5 - 25 mg/dL    Creatinine 1 61 (H) 0 60 - 1 30 mg/dL    Glucose 216 (H) 65 - 140 mg/dL    Calcium 9 0 8 3 - 10 1 mg/dL    eGFR 47 ml/min/1 73sq m   Magnesium    Collection Time: 02/22/19 10:17 AM   Result Value Ref Range    Magnesium 2 3 1 6 - 2 6 mg/dL   Sedimentation rate, automated    Collection Time: 02/22/19 10:17 AM   Result Value Ref Range    Sed Rate 21 (H) 0 - 10 mm/hour   C-reactive protein    Collection Time: 02/22/19 10:17 AM   Result Value Ref Range    CRP <3 0 <3 0 mg/L   D-dimer, quantitative    Collection Time: 02/22/19 10:17 AM   Result Value Ref Range    D-Dimer, Quant 1,689 (H) <500 ng/ml (FEU)   HEMOGLOBIN A1C W/ EAG ESTIMATION Lab Collect    Collection Time: 03/12/19  8:28 AM   Result Value Ref Range    Hemoglobin A1C 10 1 (H) 4 2 - 6 3 %     mg/dl   Comprehensive metabolic panel Lab Collect    Collection Time: 03/12/19  8:28 AM   Result Value Ref Range    Sodium 139 136 - 145 mmol/L    Potassium 4 3 3 5 - 5 3 mmol/L    Chloride 104 100 - 108 mmol/L    CO2 30 21 - 32 mmol/L    ANION GAP 5 4 - 13 mmol/L    BUN 20 5 - 25 mg/dL    Creatinine 1 37 (H) 0 60 - 1 30 mg/dL    Glucose, Fasting 129 (H) 65 - 99 mg/dL    Calcium 8 9 8 3 - 10 1 mg/dL    AST 35 5 - 45 U/L    ALT 62 12 - 78 U/L    Alkaline Phosphatase 88 46 - 116 U/L    Total Protein 7 0 6 4 - 8 2 g/dL    Albumin 3 6 3 5 - 5 0 g/dL    Total Bilirubin 0 58 0 20 - 1 00 mg/dL    eGFR 58 ml/min/1 73sq m   Hemoglobin A1C    Collection Time: 04/24/19 12:00 AM   Result Value Ref Range    Hemoglobin A1C 8 9    TB Skin Test    Collection Time: 05/10/19  1:16 PM   Result Value Ref Range    TB Skin Test Positive     Induration 13 mm mm   Quantiferon TB Gold Plus    Collection Time: 05/10/19  3:44 PM   Result Value Ref Range    QFT Nil 0 05 0 - 8 0 IU/ml    QFT TB1-NIL -0 02 IU/ml    QFT TB2-NIL -0 01 IU/ml    QFT Mitogen-NIL >10 00 IU/ml    QFT Final Interpretation Negative Negative   HEMOGLOBIN A1C W/ EAG ESTIMATION Lab Collect    Collection Time: 07/15/19  8:14 AM   Result Value Ref Range    Hemoglobin A1C 7 8 (H) 4 2 - 6 3 %     mg/dl   Comprehensive metabolic panel Lab Collect    Collection Time: 07/15/19  8:14 AM   Result Value Ref Range    Sodium 142 136 - 145 mmol/L    Potassium 4 2 3 5 - 5 3 mmol/L    Chloride 106 100 - 108 mmol/L    CO2 29 21 - 32 mmol/L    ANION GAP 7 4 - 13 mmol/L    BUN 24 5 - 25 mg/dL    Creatinine 1 80 (H) 0 60 - 1 30 mg/dL    Glucose, Fasting 130 (H) 65 - 99 mg/dL    Calcium 8 9 8 3 - 10 1 mg/dL    AST 27 5 - 45 U/L    ALT 48 12 - 78 U/L    Alkaline Phosphatase 96 46 - 116 U/L    Total Protein 7 1 6 4 - 8 2 g/dL    Albumin 3 7 3 5 - 5 0 g/dL    Total Bilirubin 0 45 0 20 - 1 00 mg/dL    eGFR 41 ml/min/1 73sq m   Microalbumin / creatinine urine ratio- Lab Collect    Collection Time: 07/15/19  8:14 AM   Result Value Ref Range    Creatinine, Ur 110 0 mg/dL    Microalbum  ,U,Random 233 0 (H) 0 0 - 20 0 mg/L    Microalb Creat Ratio 212 (H) 0 - 30 mg/g creatinine   TSH, 3rd generation Lab Collect    Collection Time: 07/15/19  8:14 AM   Result Value Ref Range    TSH 3RD GENERATON 3 350 0 358 - 3 740 uIU/mL   Lipid Panel with Direct LDL reflex Lab Collect    Collection Time: 07/15/19  8:14 AM   Result Value Ref Range    Cholesterol 155 50 - 200 mg/dL    Triglycerides 183 (H) <=150 mg/dL    HDL, Direct 36 (L) 40 - 60 mg/dL    LDL Calculated 82 0 - 100 mg/dL   CBC and differential- Lab Collect    Collection Time: 07/15/19  8:14 AM   Result Value Ref Range    WBC 7 90 4 31 - 10 16 Thousand/uL    RBC 3 97 3 88 - 5 62 Million/uL    Hemoglobin 12 4 12 0 - 17 0 g/dL    Hematocrit 36 4 (L) 36 5 - 49 3 %    MCV 92 82 - 98 fL    MCH 31 2 26 8 - 34 3 pg    MCHC 34 1 31 4 - 37 4 g/dL    RDW 12 6 11 6 - 15 1 %    MPV 11 6 8 9 - 12 7 fL    Platelets 678 485 - 580 Thousands/uL    nRBC 0 /100 WBCs    Neutrophils Relative 57 43 - 75 %    Immat GRANS % 1 0 - 2 %    Lymphocytes Relative 30 14 - 44 %    Monocytes Relative 8 4 - 12 %    Eosinophils Relative 3 0 - 6 %    Basophils Relative 1 0 - 1 %    Neutrophils Absolute 4 53 1 85 - 7 62 Thousands/µL    Immature Grans Absolute 0 04 0 00 - 0 20 Thousand/uL    Lymphocytes Absolute 2 40 0 60 - 4 47 Thousands/µL    Monocytes Absolute 0 65 0 17 - 1 22 Thousand/µL    Eosinophils Absolute 0 23 0 00 - 0 61 Thousand/µL    Basophils Absolute 0 05 0 00 - 0 10 Thousands/µL         Future Appointments   Date Time Provider Anisa Alonzo   10/1/2019  1:15 PM Sol Talbert MD [facility]       Portions of the record may have been created with voice recognition software  Occasional wrong word or "sound a like" substitutions may have occurred due to the inherent limitations of voice recognition software  Read the chart carefully and recognize, using context, where substitutions have occurred

## 2019-07-17 ENCOUNTER — TELEPHONE (OUTPATIENT)
Dept: ENDOCRINOLOGY | Facility: HOSPITAL | Age: 74
End: 2019-07-17

## 2019-07-26 DIAGNOSIS — M54.5 LOW BACK PAIN, UNSPECIFIED BACK PAIN LATERALITY, UNSPECIFIED CHRONICITY, WITH SCIATICA PRESENCE UNSPECIFIED: ICD-10-CM

## 2019-07-26 RX ORDER — GABAPENTIN 300 MG/1
CAPSULE ORAL
Qty: 180 CAPSULE | Refills: 1 | Status: SHIPPED | OUTPATIENT
Start: 2019-07-26 | End: 2020-01-30 | Stop reason: SDUPTHER

## 2019-07-29 NOTE — TELEPHONE ENCOUNTER
Higher in the morning  This morning at 8:30am blood sugar was 263,  around 9am blood sugar was 226,  took novolog 16 units before lunch and his blood sugar was 160  Ordered diflucan to pharmacy

## 2019-07-31 DIAGNOSIS — I25.10 CORONARY ARTERY DISEASE INVOLVING NATIVE HEART WITHOUT ANGINA PECTORIS, UNSPECIFIED VESSEL OR LESION TYPE: ICD-10-CM

## 2019-07-31 RX ORDER — ISOSORBIDE MONONITRATE 30 MG/1
TABLET, EXTENDED RELEASE ORAL
Qty: 90 TABLET | Refills: 1 | Status: SHIPPED | OUTPATIENT
Start: 2019-07-31 | End: 2019-10-02

## 2019-08-03 ENCOUNTER — HOSPITAL ENCOUNTER (EMERGENCY)
Facility: HOSPITAL | Age: 74
Discharge: HOME | End: 2019-08-03
Attending: EMERGENCY MEDICINE | Admitting: EMERGENCY MEDICINE
Payer: MEDICARE

## 2019-08-03 ENCOUNTER — TELEPHONE (OUTPATIENT)
Dept: OTHER | Facility: OTHER | Age: 74
End: 2019-08-03

## 2019-08-03 VITALS
OXYGEN SATURATION: 98 % | RESPIRATION RATE: 18 BRPM | HEART RATE: 63 BPM | WEIGHT: 238.32 LBS | DIASTOLIC BLOOD PRESSURE: 82 MMHG | BODY MASS INDEX: 29.01 KG/M2 | SYSTOLIC BLOOD PRESSURE: 144 MMHG | TEMPERATURE: 97 F

## 2019-08-03 DIAGNOSIS — N39.0 UTI (URINARY TRACT INFECTION): Primary | ICD-10-CM

## 2019-08-03 LAB

## 2019-08-03 PROCEDURE — 99283 EMERGENCY DEPT VISIT LOW MDM: CPT

## 2019-08-03 PROCEDURE — 81001 URINALYSIS AUTO W/SCOPE: CPT

## 2019-08-03 PROCEDURE — 99283 EMERGENCY DEPT VISIT LOW MDM: CPT | Performed by: EMERGENCY MEDICINE

## 2019-08-03 RX ORDER — PHENAZOPYRIDINE HYDROCHLORIDE 200 MG/1
200 TABLET, FILM COATED ORAL EVERY 8 HOURS PRN
Qty: 6 TABLET | Refills: 0 | Status: SHIPPED | OUTPATIENT
Start: 2019-08-03 | End: 2019-10-10 | Stop reason: ALTCHOICE

## 2019-08-03 RX ORDER — CEPHALEXIN 250 MG/1
250 CAPSULE ORAL EVERY 8 HOURS SCHEDULED
Qty: 21 CAPSULE | Refills: 0 | Status: SHIPPED | OUTPATIENT
Start: 2019-08-03 | End: 2019-08-10

## 2019-08-03 RX ORDER — ACETAMINOPHEN 325 MG/1
650 TABLET ORAL ONCE
Status: COMPLETED | OUTPATIENT
Start: 2019-08-03 | End: 2019-08-03

## 2019-08-03 RX ADMIN — ACETAMINOPHEN 650 MG: 325 TABLET ORAL at 20:52

## 2019-08-03 NOTE — ED PROVIDER NOTES
History  Chief Complaint   Patient presents with    Possible UTI     reports difficuly urinating, abd pain since thurs  70-year-old male with a history of CAD, hypertension, diabetes, bladder cancer, UTI presents with symptoms of UTI over the last 3 days  He states his symptoms are exactly like his previous UTIs  He thinks his last UTI was in April  He does follow with a urologist   He states he has been having burning and frequency with urination along with suprapubic pain and low back pain  No hematuria  No fevers or chills  No nausea or vomiting  History provided by:  Patient   used: No    Difficulty Urinating   Presenting symptoms: dysuria and penile pain    Presenting symptoms: no penile discharge, no scrotal pain and no swelling    Dysuria:     Severity:  Unable to specify    Onset quality:  Gradual    Duration:  3 days    Timing:  Intermittent    Progression:  Unchanged    Chronicity:  Recurrent  Context: during urination    Relieved by:  Nothing  Worsened by:  Urination  Ineffective treatments:  None tried  Associated symptoms: abdominal pain and urinary frequency    Associated symptoms: no diarrhea, no fever, no flank pain, no genital itching, no genital lesions, no genital rash, no groin pain, no hematuria, no nausea, no penile redness, no penile swelling, no priapism, no scrotal swelling, no urinary hesitation, no urinary incontinence, no urinary retention and no vomiting    Risk factors: no kidney stones, no recent infection and no urinary catheter        Prior to Admission Medications   Prescriptions Last Dose Informant Patient Reported? Taking?    Ascorbic Acid, Vitamin C, (VITAMIN C) 100 MG tablet 8/2/2019 at Unknown time Self Yes Yes   Sig: Vitamin C 500 mg capsule,extended release   TAKE 1 CAPSULE (500 MG TOTAL) BY MOUTH 2 (TWO) TIMES A DAY   Azelastine HCl 0 15 % SOLN  Self Yes No   Sig: Inhale 1 spray 2 (two) times a day   Cholecalciferol 92047 units capsule 8/3/2019 at Unknown time Self Yes Yes   Sig: Take 50,000 Units by mouth every 30 (thirty) days   Insulin Pen Needle 31G X 8 MM MISC  Self No No   Sig: Inject 3 times a day   LANTUS SOLOSTAR 100 units/mL injection pen 2019 at Unknown time Self No Yes   Si units qhs   Patient taking differently: 26-28 units qhs   NOVOLOG FLEXPEN 100 units/mL injection pen 8/3/2019 at Unknown time Self No Yes   Sig: INJECT UP TO 50 UNITS DAILY SUB CUT   Patient taking differently: Inject 15 Units under the skin 3 (three) times a day with meals    aspirin 81 MG tablet 8/3/2019 at Unknown time Self Yes Yes   Sig: Take 81 mg by mouth daily  bimatoprost (LUMIGAN) 0 01 % ophthalmic drops 2019 at Unknown time Self Yes Yes   Si drop daily at bedtime  bismuth subsalicylate (PEPTO BISMOL) 262 MG chewable tablet  Self Yes No   Sig: Chew 524 mg daily as needed for indigestion  fluocinonide (LIDEX) 0 05 % cream  Self Yes No   Sig: Apply topically 2 (two) times a day     fluticasone (FLONASE) 50 mcg/act nasal spray  Self No No   Si spray into each nostril daily   furosemide (LASIX) 20 mg tablet 8/3/2019 at Unknown time Self No Yes   Sig: TAKE 1 TABLET DAILY   gabapentin (NEURONTIN) 300 mg capsule 8/3/2019 at Unknown time  No Yes   Sig: TAKE 2 CAPSULES BY MOUTH EVERY DAY AT BEDTIME   isosorbide mononitrate (IMDUR) 30 mg 24 hr tablet 8/3/2019 at Unknown time  No Yes   Sig: TAKE 1 TABLET DAILY   lisinopril (ZESTRIL) 5 mg tablet 2019 at Unknown time Self Yes Yes   Sig: Take 5 mg by mouth daily   loperamide (IMODIUM) 2 mg capsule  Self Yes No   Sig: Take by mouth   loratadine (CLARITIN) 10 mg tablet   No No   Sig: Take 1 tablet (10 mg total) by mouth daily for 15 days   metFORMIN (GLUCOPHAGE) 1000 MG tablet 8/3/2019 at Unknown time Self No Yes   Sig: TAKE 0 5 TABLETS (500 MG TOTAL) BY MOUTH DAILY WITH BREAKFAST   metoprolol succinate (TOPROL-XL) 100 mg 24 hr tablet 8/3/2019 at Unknown time Self Yes Yes   Sig: Take 100 mg by mouth daily  multivitamin (THERAGRAN) TABS 8/3/2019 at Unknown time Self Yes Yes   Sig: Take 1 tablet by mouth daily  omeprazole (PriLOSEC) 20 mg delayed release capsule 8/3/2019 at Unknown time Self No Yes   Sig: Take 1 capsule (20 mg total) by mouth daily   triamcinolone (KENALOG) 0 5 % ointment  Self No No   Sig: Apply topically 2 (two) times a day   Patient not taking: Reported on 7/16/2019      Facility-Administered Medications: None       Past Medical History:   Diagnosis Date    Anemia     Last assessed: 9/28/17    Arteriosclerotic cardiovascular disease     Last assessed: 9/28/17    Bladder cancer [facility] [address] [facility]     Cardiac disease     Diabetes mellitus ([facility] )     Hypertension     Hypotension     Last assessed: 2/24/15    Insomnia     Last assessed: 11/14/12    Other seasonal allergic rhinitis     Last assessed: 2/10/16    Transient cerebral ischemia        Past Surgical History:   Procedure Laterality Date    CARDIAC SURGERY      Cath stent placement  Last assessed: 3/9/17  Interventional Catheterization    CHOLECYSTECTOMY      CYSTOSCOPY      Diagnostic w/biopsy  Kayleen Guajardo  Last assessed: 12/1/14    CYSTOURETHROSCOPY      w/cautery  Kayleen Guajardo    GASTRIC BYPASS      For morbid obesity w/Shaji-en-Y  Resolved: 11/17/09    INCISION AND DRAINAGE OF WOUND Right 2/26/2017    Procedure: INCISION AND DRAINAGE (I&D) EXTREMITY WITH APPLICATION OF GRAFT JACKET;  Surgeon: Mel Shen DPM;  Location[facility];  Service:     INCISION AND DRAINAGE OF WOUND Right 4/25/2017    Procedure: INCISION AND DRAINAGE (I&D) EXTREMITY, APPLICATION OF GRAFT;  Surgeon: Mel Shen DPM;  Location[facility];  Service:     JOINT REPLACEMENT      Knee   Last assessed: 1/28/11    MO CYSTOURETHROSCOPY,BIOPSY N/A 8/16/2016    Procedure: Sophia Branch;  Surgeon: Aiyana Albrecht MD;  [facility];  Service: Urology    [address]      Surgery Shaji-en-Y    SPINAL FUSION      VAC DRESSING APPLICATION Right 0/52/5816    Procedure: APPLICATION VAC DRESSING;  Surgeon: Alicia Perez DPM;  Location: [facility];  Service:        Family History   Problem Relation Age of Onset    Diabetes Mother     Heart disease Mother     Other Mother         High blood pressure    Heart disease Father     Diabetes Sister     Other Sister         High blood pressure    Kidney disease Sister     Heart disease Brother     Other Brother         High blood pressure     I have reviewed and agree with the history as documented  Social History     Tobacco Use    Smoking status: Former Smoker    Smokeless tobacco: Never Used   Substance Use Topics    Alcohol use: No    Drug use: No        Review of Systems   Constitutional: Negative  Negative for fever  HENT: Negative  Eyes: Negative  Respiratory: Negative  Cardiovascular: Negative  Gastrointestinal: Positive for abdominal pain  Negative for diarrhea, nausea and vomiting  Genitourinary: Positive for dysuria, frequency and penile pain  Negative for bladder incontinence, difficulty urinating, discharge, enuresis, flank pain, hematuria, hesitancy, penile swelling and scrotal swelling  Musculoskeletal: Negative for neck pain  Skin: Negative  Allergic/Immunologic: Negative  Neurological: Negative  Negative for weakness, numbness and headaches  Hematological: Negative  Psychiatric/Behavioral: Negative  All other systems reviewed and are negative  Physical Exam  Physical Exam   Constitutional: He is oriented to person, place, and time  He appears well-developed and well-nourished  Non-toxic appearance  He does not have a sickly appearance  He does not appear ill  No distress  HENT:   Head: Normocephalic and atraumatic  Right Ear: External ear normal    Left Ear: External ear normal    Eyes: Pupils are equal, round, and reactive to light   Conjunctivae are normal  No scleral icterus  Cardiovascular: Normal rate, regular rhythm and normal heart sounds  Pulmonary/Chest: Effort normal and breath sounds normal    Abdominal: Soft  Normal appearance and bowel sounds are normal  He exhibits no distension and no mass  There is tenderness in the suprapubic area  There is no rebound and no guarding  No hernia  Musculoskeletal: He exhibits no edema  Neurological: He is alert and oriented to person, place, and time  He has normal strength and normal reflexes  He exhibits normal muscle tone  Skin: Skin is warm and dry  No rash noted  He is not diaphoretic  No erythema  No pallor  Psychiatric: He has a normal mood and affect  Nursing note and vitals reviewed        Vital Signs  ED Triage Vitals   Temperature Pulse Respirations Blood Pressure SpO2   08/03/19 1910 08/03/19 1910 08/03/19 1910 08/03/19 1910 08/03/19 1910   (!) 97 °F (36 1 °C) 70 16 118/68 99 %      Temp Source Heart Rate Source Patient Position - Orthostatic VS BP Location FiO2 (%)   08/03/19 1910 08/03/19 2039 08/03/19 1910 08/03/19 1910 --   Temporal Monitor Sitting Right arm       Pain Score       08/03/19 1910       8           Vitals:    08/03/19 1910 08/03/19 2039   BP: 118/68 144/82   Pulse: 70 63   Patient Position - Orthostatic VS: Sitting Lying         Visual Acuity      ED Medications  Medications   acetaminophen (TYLENOL) tablet 650 mg (650 mg Oral Given 8/3/19 2052)       Diagnostic Studies  Results Reviewed     Procedure Component Value Units Date/Time    Urine Microscopic [[id number]]  (Abnormal) Collected:  08/03/19 2020    Lab Status:  Final result Specimen:  Urine, Clean Catch Updated:  08/03/19 2033     RBC, UA 4-10 /hpf      WBC, UA 4-10 /hpf      Epithelial Cells Occasional /hpf      Bacteria, UA Occasional /hpf     POCT urinalysis dipstick [[id number]]  (Abnormal) Resulted:  08/03/19 2007    Lab Status:  Final result Specimen:  Urine Updated:  08/03/19 2007    ED Urine Macroscopic [[id number]]  (Abnormal) Collected:  08/03/19 2020    Lab Status:  Final result Specimen:  Urine Updated:  08/03/19 2006     Color, UA Yellow     Clarity, UA Clear     pH, UA 5 5     Leukocytes, UA Moderate     Nitrite, UA Negative     Protein, UA >=300 mg/dl      Glucose, UA Negative mg/dl      Ketones, UA Negative mg/dl      Urobilinogen, UA 0 2 E U /dl      Bilirubin, UA Negative     Blood, UA Moderate     Specific Gravity, UA 1 025    Narrative:       CLINITEK RESULT                 No orders to display              Procedures  Procedures       ED Course                               MDM  Number of Diagnoses or Management Options  Diagnosis management comments: 68-year-old male presents with symptoms of UTI  He states he has frequent UTIs and is following with Urology  Three days ago he started with suprapubic pain, low back pain, frequency of urination and dysuria which are his typical symptoms  No nausea or vomiting  No fevers or chills  On exam he is alert in no distress  He has mild suprapubic tenderness on exam   Will dip urine to rule out UTI  Amount and/or Complexity of Data Reviewed  Independent visualization of images, tracings, or specimens: yes        Disposition  Final diagnoses:   UTI (urinary tract infection)     Time reflects when diagnosis was documented in both MDM as applicable and the Disposition within this note     Time User Action Codes Description Comment    8/3/2019  8:51 PM Floyd CLEMENTS Add [N39 0] UTI (urinary tract infection)       ED Disposition     ED Disposition Condition Date/Time Comment    Discharge Good Sat Aug 3, 2019  8:51 PM Honey Snowden discharge to home/self care              Follow-up Information     Follow up With Specialties Details Why Contact Info        Follow-up with your urologist on Monday or Tuesday          Patient's Medications   Discharge Prescriptions    CEPHALEXIN (KEFLEX) 250 MG CAPSULE    Take 1 capsule (250 mg total) by mouth every 8 (eight) hours for 7 days Start Date: 8/3/2019  End Date: 8/10/2019       Order Dose: 250 mg       Quantity: 21 capsule    Refills: 0    PHENAZOPYRIDINE (PYRIDIUM) 200 MG TABLET    Take 1 tablet (200 mg total) by mouth every 8 (eight) hours as needed for bladder spasms       Start Date: 8/3/2019  End Date: --       Order Dose: 200 mg       Quantity: 6 tablet    Refills: 0     No discharge procedures on file      ED Provider  Electronically Signed by           Alison Hdz DO  08/03/19 8222

## 2019-08-03 NOTE — TELEPHONE ENCOUNTER
Toño Diego 1945  CONFIDENTIALTY NOTICE: This fax transmission is intended only for the addressee  It contains information that is legally privileged,  confidential or otherwise protected from use or disclosure  If you are not the intended recipient, you are strictly prohibited from reviewing,  disclosing, copying using or disseminating any of this information or taking any action in reliance on or regarding this information  If you have  received this fax in error, please notify us immediately by telephone so that we can arrange for its return to us  Page:   Call Id: [id number]  Health Call  Standard Call Report  Health Call  Patient Name: Toño Diego  Gender: Male  : 1945  Age: 66 Y 11 M 25 D  Return Phone  Number: [phone] (Home)  Address: [address] [place] [address]  City/State/[facility]: [address]  [id number]  Practice Name: [address] [place] [address] Charged:  Physician:  Kristal Chatman Name:  Relationship To  Patient: Self  Return Phone Number: [phone] (Home)  Presenting Problem: " I feel like I have a UTI "  Service Type: Triage  Charged Service 1: Ivory Espinoza U  38  Name and  Number:  Nurse Assessment  Nurse: Roddy Sorenson RN, [facility] Date/Time: 8/3/2019 3:16:21 PM  Type of assessment required:  ---General (Adult or Child)  Duration of Current S/S  ---Since Wednesday  Location/Radiation  ---  Temperature (F) and route:  ---Denies fever  Symptom Specific Meds (Dose/Time):  ---None  Other S/S  ---Burning pain when trying to urinate and severe lower abdominal pressure  State that  urine is coming out very little  Is having lots of frequency  Pain Scale on scale of 1-10, 10 being the worst:  ---9/10- for lower abdominal pressure  Symptom progression:  ---worse  Intake and Output  ---Drinking normally / Decreased  Last Exam/Treatment:  Toño Diego 1945  CONFIDENTIALTY NOTICE: This fax transmission is intended only for the addressee   It contains information that is legally privileged,  confidential or otherwise protected from use or disclosure  If you are not the intended recipient, you are strictly prohibited from reviewing,  disclosing, copying using or disseminating any of this information or taking any action in reliance on or regarding this information  If you have  received this fax in error, please notify us immediately by telephone so that we can arrange for its return to us  Page: 2 of 2  Call Id: [id number]  Nurse Assessment  ---Seen in the office in the end of June for Diabetes  Protocols  Protocol Title Nurse Date/Time  Urination Pain - Male Deep Horan 8/3/2019 3:17:25 PM  Question Caller Affirmed  Disp  Time Disposition Final User  8/3/2019 2:38:36 PM Send to Follow Up Kyle Henderson RN, [facility]ie Bowen  8/3/2019 3:24:29 PM Go to ED Now Yes Roxana Killian RN, [facility] Advice Given Per Protocol  GO TO ED NOW: You need to be seen in the Emergency Department  Go to the ER at _____[facility]  Leave now  Drive carefully  BRING MEDICINES: * Please bring a list of your current medicines when you go to the Emergency Department (ER)  *  It is also a good idea to bring the pill bottles too  This will help the doctor to make certain you are taking the right medicines and the right  dose  CARE ADVICE given per Urination Pain - Male (Adult) guideline  Caller Understands: Yes  Caller Disagree/Comply: Comply  PreDisposition: Unsure  Comments  User: Ranjeet Reynolds RN Date/Time: 8/3/2019 2:37:55 PM  Called back and no answer  Left VM  Will try again in a few minutes

## 2019-08-13 ENCOUNTER — TELEPHONE (OUTPATIENT)
Dept: INTERNAL MEDICINE CLINIC | Facility: CLINIC | Age: 74
End: 2019-08-13

## 2019-08-13 ENCOUNTER — TRANSITIONAL CARE MANAGEMENT (OUTPATIENT)
Dept: INTERNAL MEDICINE CLINIC | Facility: CLINIC | Age: 74
End: 2019-08-13

## 2019-08-13 ENCOUNTER — APPOINTMENT (OUTPATIENT)
Dept: LAB | Facility: HOSPITAL | Age: 74
End: 2019-08-13
Attending: INTERNAL MEDICINE
Payer: MEDICARE

## 2019-08-13 DIAGNOSIS — N30.00 ACUTE CYSTITIS WITHOUT HEMATURIA: Primary | ICD-10-CM

## 2019-08-13 DIAGNOSIS — R35.0 FREQUENCY OF URINATION: Primary | ICD-10-CM

## 2019-08-13 LAB
BACTERIA UR QL AUTO: ABNORMAL /HPF
BILIRUB UR QL STRIP: NEGATIVE
CLARITY UR: ABNORMAL
COLOR UR: YELLOW
GLUCOSE UR STRIP-MCNC: NEGATIVE MG/DL
HGB UR QL STRIP.AUTO: ABNORMAL
KETONES UR STRIP-MCNC: NEGATIVE MG/DL
LEUKOCYTE ESTERASE UR QL STRIP: ABNORMAL
NITRITE UR QL STRIP: NEGATIVE
NON-SQ EPI CELLS URNS QL MICRO: ABNORMAL /HPF
PH UR STRIP.AUTO: 6 [PH]
PROT UR STRIP-MCNC: ABNORMAL MG/DL
RBC #/AREA URNS AUTO: ABNORMAL /HPF
SP GR UR STRIP.AUTO: >=1.03 (ref 1–1.03)
UROBILINOGEN UR QL STRIP.AUTO: 0.2 E.U./DL
WBC #/AREA URNS AUTO: ABNORMAL /HPF

## 2019-08-13 PROCEDURE — 87086 URINE CULTURE/COLONY COUNT: CPT

## 2019-08-13 PROCEDURE — 81001 URINALYSIS AUTO W/SCOPE: CPT

## 2019-08-13 RX ORDER — CEPHALEXIN 500 MG/1
500 CAPSULE ORAL EVERY 8 HOURS SCHEDULED
Qty: 21 CAPSULE | Refills: 0 | Status: SHIPPED | OUTPATIENT
Start: 2019-08-13 | End: 2019-08-20

## 2019-08-13 NOTE — TELEPHONE ENCOUNTER
C/o frequency & burning with urination for 2 weeks  He called urology ([facility]) and got no response from them  Order done for UA/culture

## 2019-08-15 LAB — BACTERIA UR CULT: NORMAL

## 2019-08-30 ENCOUNTER — TRANSCRIBE ORDERS (OUTPATIENT)
Dept: ADMINISTRATIVE | Facility: HOSPITAL | Age: 74
End: 2019-08-30

## 2019-08-30 DIAGNOSIS — N30.00 ACUTE CYSTITIS WITHOUT HEMATURIA: Primary | ICD-10-CM

## 2019-08-30 DIAGNOSIS — C67.0 MALIGNANT NEOPLASM OF TRIGONE OF URINARY BLADDER (HCC): Primary | ICD-10-CM

## 2019-08-31 LAB — HBA1C MFR BLD HPLC: 8.3 %

## 2019-09-03 ENCOUNTER — APPOINTMENT (OUTPATIENT)
Dept: LAB | Facility: HOSPITAL | Age: 74
End: 2019-09-03
Attending: INTERNAL MEDICINE
Payer: MEDICARE

## 2019-09-03 ENCOUNTER — HOSPITAL ENCOUNTER (OUTPATIENT)
Dept: CT IMAGING | Facility: HOSPITAL | Age: 74
Discharge: HOME | End: 2019-09-03
Payer: MEDICARE

## 2019-09-03 DIAGNOSIS — C67.0 MALIGNANT NEOPLASM OF TRIGONE OF URINARY BLADDER (HCC): ICD-10-CM

## 2019-09-03 DIAGNOSIS — N30.00 ACUTE CYSTITIS WITHOUT HEMATURIA: ICD-10-CM

## 2019-09-03 LAB
BACTERIA UR QL AUTO: ABNORMAL /HPF
BILIRUB UR QL STRIP: NEGATIVE
CLARITY UR: ABNORMAL
COLOR UR: YELLOW
GLUCOSE UR STRIP-MCNC: NEGATIVE MG/DL
HGB UR QL STRIP.AUTO: ABNORMAL
KETONES UR STRIP-MCNC: NEGATIVE MG/DL
LEUKOCYTE ESTERASE UR QL STRIP: ABNORMAL
NITRITE UR QL STRIP: NEGATIVE
NON-SQ EPI CELLS URNS QL MICRO: ABNORMAL /HPF
PH UR STRIP.AUTO: 6 [PH]
PROT UR STRIP-MCNC: ABNORMAL MG/DL
RBC #/AREA URNS AUTO: ABNORMAL /HPF
SP GR UR STRIP.AUTO: 1.02 (ref 1–1.03)
UROBILINOGEN UR QL STRIP.AUTO: 0.2 E.U./DL
WBC #/AREA URNS AUTO: ABNORMAL /HPF

## 2019-09-03 PROCEDURE — 81001 URINALYSIS AUTO W/SCOPE: CPT

## 2019-09-03 PROCEDURE — 87147 CULTURE TYPE IMMUNOLOGIC: CPT

## 2019-09-03 PROCEDURE — 74176 CT ABD & PELVIS W/O CONTRAST: CPT

## 2019-09-03 PROCEDURE — 87086 URINE CULTURE/COLONY COUNT: CPT

## 2019-09-04 ENCOUNTER — TELEPHONE (OUTPATIENT)
Dept: INTERNAL MEDICINE CLINIC | Facility: CLINIC | Age: 74
End: 2019-09-04

## 2019-09-04 DIAGNOSIS — N30.00 ACUTE CYSTITIS WITHOUT HEMATURIA: Primary | ICD-10-CM

## 2019-09-04 RX ORDER — CIPROFLOXACIN 250 MG/1
TABLET, FILM COATED ORAL
Qty: 10 TABLET | Refills: 1 | Status: SHIPPED | OUTPATIENT
Start: 2019-09-04 | End: 2019-09-09

## 2019-09-04 NOTE — TELEPHONE ENCOUNTER
----- Message from Sol Talbert MD sent at 9/4/2019  6:24 AM EDT -----  Please call the patient regarding his abnormal result    Pyuria in the urinalysis he has symptoms of UTI start on Cipro renally adjusted 250 mg twice a day for 5 days +1 refill

## 2019-09-04 NOTE — TELEPHONE ENCOUNTER
Patient called with c/o UTI again  UA done yesterday showed infection  Rx was sent for Cipro 250 mg twice daily for 5 days  Gloria Stoner will contact his Nephrologist about elevated BUN & creatinine

## 2019-09-05 ENCOUNTER — TELEPHONE (OUTPATIENT)
Dept: INTERNAL MEDICINE CLINIC | Facility: CLINIC | Age: 74
End: 2019-09-05

## 2019-09-05 LAB
BACTERIA UR CULT: ABNORMAL
BACTERIA UR CULT: ABNORMAL

## 2019-09-05 NOTE — TELEPHONE ENCOUNTER
Micheal Domínguez called this morning stating he had gotten a phone call from his oncology office at [facility]  He was instructed to take Uribel for the UTI  He had it filled and his hands were swollen this morning  I told him to call the oncology office and let them know  He called me back and said he was told to stop taking Uribel  He will get the Cipro that was sent from this office yesterday and start taking that

## 2019-09-26 LAB — PROT/CREAT UR: 1.84 MG/G{CREAT}

## 2019-10-02 ENCOUNTER — OFFICE VISIT (OUTPATIENT)
Dept: INTERNAL MEDICINE CLINIC | Facility: CLINIC | Age: 74
End: 2019-10-02
Payer: MEDICARE

## 2019-10-02 VITALS
WEIGHT: 242 LBS | BODY MASS INDEX: 29.47 KG/M2 | SYSTOLIC BLOOD PRESSURE: 106 MMHG | HEIGHT: 76 IN | RESPIRATION RATE: 14 BRPM | DIASTOLIC BLOOD PRESSURE: 66 MMHG | HEART RATE: 79 BPM | TEMPERATURE: 97.6 F

## 2019-10-02 DIAGNOSIS — I25.10 CORONARY ARTERY DISEASE INVOLVING NATIVE HEART WITHOUT ANGINA PECTORIS, UNSPECIFIED VESSEL OR LESION TYPE: ICD-10-CM

## 2019-10-02 DIAGNOSIS — J42 CHRONIC BRONCHITIS, UNSPECIFIED CHRONIC BRONCHITIS TYPE (HCC): ICD-10-CM

## 2019-10-02 DIAGNOSIS — I10 ESSENTIAL HYPERTENSION: ICD-10-CM

## 2019-10-02 DIAGNOSIS — E78.5 HYPERLIPIDEMIA, UNSPECIFIED HYPERLIPIDEMIA TYPE: ICD-10-CM

## 2019-10-02 DIAGNOSIS — F41.9 ANXIETY: ICD-10-CM

## 2019-10-02 DIAGNOSIS — E11.51 TYPE 2 DIABETES MELLITUS WITH DIABETIC PERIPHERAL ANGIOPATHY WITHOUT GANGRENE, WITH LONG-TERM CURRENT USE OF INSULIN (HCC): Primary | ICD-10-CM

## 2019-10-02 DIAGNOSIS — E11.622 TYPE 2 DIABETES MELLITUS WITH OTHER SKIN ULCER, WITH LONG-TERM CURRENT USE OF INSULIN (HCC): ICD-10-CM

## 2019-10-02 DIAGNOSIS — Z79.4 TYPE 2 DIABETES MELLITUS WITH OTHER SKIN ULCER, WITH LONG-TERM CURRENT USE OF INSULIN (HCC): ICD-10-CM

## 2019-10-02 DIAGNOSIS — Z79.4 TYPE 2 DIABETES MELLITUS WITH DIABETIC PERIPHERAL ANGIOPATHY WITHOUT GANGRENE, WITH LONG-TERM CURRENT USE OF INSULIN (HCC): Primary | ICD-10-CM

## 2019-10-02 DIAGNOSIS — I25.10 CORONARY ARTERY DISEASE INVOLVING NATIVE CORONARY ARTERY OF NATIVE HEART WITHOUT ANGINA PECTORIS: ICD-10-CM

## 2019-10-02 DIAGNOSIS — N18.30 CKD (CHRONIC KIDNEY DISEASE) STAGE 3, GFR 30-59 ML/MIN (HCC): ICD-10-CM

## 2019-10-02 PROCEDURE — 99214 OFFICE O/P EST MOD 30 MIN: CPT | Performed by: INTERNAL MEDICINE

## 2019-10-02 RX ORDER — ALPRAZOLAM 0.25 MG/1
TABLET ORAL
Qty: 30 TABLET | Refills: 0 | Status: SHIPPED | OUTPATIENT
Start: 2019-10-02 | End: 2020-07-16 | Stop reason: SDUPTHER

## 2019-10-02 RX ORDER — ISOSORBIDE MONONITRATE 30 MG/1
30 TABLET, EXTENDED RELEASE ORAL DAILY
Qty: 90 TABLET | Refills: 1 | Status: SHIPPED | OUTPATIENT
Start: 2019-10-02 | End: 2019-10-10 | Stop reason: ALTCHOICE

## 2019-10-02 NOTE — PROGRESS NOTES
Assessment/Plan:    Patient comes in prior to his cardiac catheterization tomorrow undergoing angioplasty for coronary artery disease with angina with exertion the day of the procedures we went over the medication to take  Do not take any diabetic medications the day of surgery  The night before surgery taking Lantus only 15 units at bedtime that is half for your regular does  The day of surgery take the following  Aspirin 81  Gabapentin 300 milligrams  Isosorbide mononitrate 60 milligrams  Metoprolol succinate 100 milligrams  Xanax 0 25 half or 1 tablet    Problem 2  Diabetes follows up with the endocrinologist has been instructed on the Lantus to take half the dose the night before the procedure the day appeared the procedure not to take any medication denies any polyuria polydipsia    Problem 3  Blood pressure very well control no change in medication  I did tell him to hold the lisinopril the day of surgery  Problem 4  Chronic renal failure so nephrologist the going to follow him up in the hospital while he is doing the cardiac catheterization and hydrate him to avoid any further renal dysfunction  He has class 3 chronic renal failure  Problem 5  Bladder cancer he had a CT scan which showed no abnormality except some thickening on the bladder is going to have a cysto a [employer] this month he has already been cleared by Cardiology that he may able to do that on for course he is going to be on double antiplatelet therapy after his angioplasty  No problem-specific Assessment & Plan notes found for this encounter         Diagnoses and all orders for this visit:    Type 2 diabetes mellitus with diabetic peripheral angiopathy without gangrene, with long-term current use of insulin (HCC)    Chronic bronchitis, unspecified chronic bronchitis type (HCC)    Coronary artery disease involving native coronary artery of native heart without angina pectoris    Essential hypertension    CKD (chronic kidney disease) stage 3, GFR 30-59 ml/min ([facility])    Hyperlipidemia, unspecified hyperlipidemia type    Coronary artery disease involving native heart without angina pectoris, unspecified vessel or lesion type  -     isosorbide mononitrate (IMDUR) 30 mg 24 hr tablet; Take 1 tablet (30 mg total) by mouth daily Take 2 tablets daily    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, with long-term current use of insulin ([facility])    Anxiety  -     ALPRAZolam (XANAX) 0 25 mg tablet; At twice a day as needed for anxiety          Subjective:     Results for orders placed or performed in visit on 09/03/19   Urine culture   Result Value Ref Range    Urine Culture (A)      >100,000 cfu/ml Beta Hemolytic Streptococcus Group B    Urine Culture 50,000-59,000 cfu/ml          Patient ID: Messi Handley is a 66 y o  male  Chief Complaint   Patient presents with    Follow-up         Current Outpatient Medications:     Ascorbic Acid, Vitamin C, (VITAMIN C) 100 MG tablet, Vitamin C 500 mg capsule,extended release  TAKE 1 CAPSULE (500 MG TOTAL) BY MOUTH 2 (TWO) TIMES A DAY, Disp: , Rfl:     aspirin 81 MG tablet, Take 81 mg by mouth daily  , Disp: , Rfl:     Azelastine HCl 0 15 % SOLN, Inhale 1 spray 2 (two) times a day, Disp: , Rfl: 0    bimatoprost (LUMIGAN) 0 01 % ophthalmic drops, 1 drop daily at bedtime  , Disp: , Rfl:     bismuth subsalicylate (PEPTO BISMOL) 262 MG chewable tablet, Chew 524 mg daily as needed for indigestion  , Disp: , Rfl:     Cholecalciferol 17813 units capsule, Take 50,000 Units by mouth every 30 (thirty) days, Disp: , Rfl:     fluocinonide (LIDEX) 0 05 % cream, Apply topically 2 (two) times a day , Disp: , Rfl:     fluticasone (FLONASE) 50 mcg/act nasal spray, 1 spray into each nostril daily, Disp: 16 g, Rfl: 3    furosemide (LASIX) 20 mg tablet, TAKE 1 TABLET DAILY, Disp: 90 tablet, Rfl: 1    gabapentin (NEURONTIN) 300 mg capsule, TAKE 2 CAPSULES BY MOUTH EVERY DAY AT BEDTIME, Disp: 180 capsule, Rfl: 1   Insulin Pen Needle 31G X 8 MM MISC, Inject 3 times a day, Disp: 100 each, Rfl: 2    isosorbide mononitrate (IMDUR) 30 mg 24 hr tablet, Take 1 tablet (30 mg total) by mouth daily Take 2 tablets daily, Disp: 90 tablet, Rfl: 1    LANTUS SOLOSTAR 100 units/mL injection pen, 30 units qhs (Patient taking differently: 26-28 units qhs), Disp: 15 pen, Rfl: 3    lisinopril (ZESTRIL) 5 mg tablet, Take 5 mg by mouth daily, Disp: , Rfl:     loperamide (IMODIUM) 2 mg capsule, Take by mouth, Disp: , Rfl:     metFORMIN (GLUCOPHAGE) 1000 MG tablet, TAKE 0 5 TABLETS (500 MG TOTAL) BY MOUTH DAILY WITH BREAKFAST, Disp: 90 tablet, Rfl: 0    metoprolol succinate (TOPROL-XL) 100 mg 24 hr tablet, Take 100 mg by mouth daily  , Disp: , Rfl:     multivitamin (THERAGRAN) TABS, Take 1 tablet by mouth daily  , Disp: , Rfl:     NOVOLOG FLEXPEN 100 units/mL injection pen, INJECT UP TO 50 UNITS DAILY SUB CUT (Patient taking differently: Inject 15 Units under the skin 3 (three) times a day with meals ), Disp: 15 pen, Rfl: 1    omeprazole (PriLOSEC) 20 mg delayed release capsule, Take 1 capsule (20 mg total) by mouth daily, Disp: 90 capsule, Rfl: 1    phenazopyridine (PYRIDIUM) 200 mg tablet, Take 1 tablet (200 mg total) by mouth every 8 (eight) hours as needed for bladder spasms, Disp: 6 tablet, Rfl: 0    triamcinolone (KENALOG) 0 5 % ointment, Apply topically 2 (two) times a day, Disp: 30 g, Rfl: 0    ALPRAZolam (XANAX) 0 25 mg tablet, At twice a day as needed for anxiety, Disp: 30 tablet, Rfl: 0    loratadine (CLARITIN) 10 mg tablet, Take 1 tablet (10 mg total) by mouth daily for 15 days, Disp: 30 tablet, Rfl: 1    HPI    The following portions of the patient's history were reviewed and updated as appropriate: allergies, current medications, past family history, past medical history, past social history, past surgical history and problem list     Review of Systems   Constitutional: Negative    Negative for activity change, appetite change, fatigue, fever and unexpected weight change  HENT: Negative for congestion, ear pain, hearing loss, mouth sores, postnasal drip, rhinorrhea, sore throat, trouble swallowing and voice change  Eyes: Negative for pain, redness and visual disturbance  Respiratory: Negative for cough, chest tightness, shortness of breath and wheezing  Cardiovascular: Positive for chest pain  Negative for palpitations and leg swelling  Gastrointestinal: Negative for abdominal distention, abdominal pain, blood in stool, constipation, diarrhea and nausea  Endocrine: Negative for cold intolerance, heat intolerance, polydipsia, polyphagia and polyuria  Genitourinary: Negative for difficulty urinating, dysuria, flank pain, frequency, hematuria and urgency  Musculoskeletal: Negative for arthralgias, back pain, gait problem, joint swelling and myalgias  Skin: Negative for color change and pallor  Neurological: Negative for dizziness, tremors, seizures, syncope, weakness, numbness and headaches  Hematological: Negative for adenopathy  Does not bruise/bleed easily  Psychiatric/Behavioral: Negative  Negative for sleep disturbance  The patient is not nervous/anxious  Objective:    /66 (BP Location: Left arm, Patient Position: Sitting, Cuff Size: Standard)   Pulse 79   Temp 97 6 °F (36 4 °C)   Resp 14   Ht 6' 4" (1 93 m)   Wt 110 kg (242 lb)   BMI 29 46 kg/m²      Physical Exam   Constitutional: He is oriented to person, place, and time  He appears well-developed and well-nourished  HENT:   Head: Normocephalic  Right Ear: External ear normal    Left Ear: External ear normal    Nose: Nose normal    Mouth/Throat: Oropharynx is clear and moist  No oropharyngeal exudate  Eyes: Pupils are equal, round, and reactive to light  Conjunctivae and EOM are normal    Neck: Normal range of motion  Neck supple  No thyromegaly present     Cardiovascular: Normal rate, regular rhythm, normal heart sounds and intact distal pulses  Exam reveals no gallop and no friction rub  No murmur heard  S1-S2 regular rhythm  Extremities no edema   Pulmonary/Chest: Effort normal and breath sounds normal  No respiratory distress  He has no wheezes  He has no rales  Lungs are clear no wheezing rales or rhonchi   Abdominal: Soft  Bowel sounds are normal  He exhibits no distension and no mass  There is no tenderness  There is no rebound and no guarding  Abdomen soft nontender   Musculoskeletal: Normal range of motion  Lymphadenopathy:     He has no cervical adenopathy  Neurological: He is alert and oriented to person, place, and time  Skin: Skin is warm and dry  Psychiatric: He has a normal mood and affect  His behavior is normal  Judgment normal    Nursing note and vitals reviewed

## 2019-10-02 NOTE — PATIENT INSTRUCTIONS
No diabetic medications the day of the procedure  The day before take Lantus 15 units at bedtime  Aspirin 81  Gabapentin 300 milligrams  Isosorbide mononitrate 60 milligrams  Metoprolol succinate 100 milligrams  Omeprazole 20 milligrams    For anxiety will orders Xanax alprazolam 0 25 milligrams half a 1 tablet twice a day as needed you may take 1 the day of the procedure

## 2019-10-10 ENCOUNTER — OFFICE VISIT (OUTPATIENT)
Dept: INTERNAL MEDICINE CLINIC | Facility: CLINIC | Age: 74
End: 2019-10-10
Payer: MEDICARE

## 2019-10-10 VITALS
TEMPERATURE: 97.7 F | BODY MASS INDEX: 29.59 KG/M2 | RESPIRATION RATE: 14 BRPM | WEIGHT: 243 LBS | HEART RATE: 65 BPM | HEIGHT: 76 IN | DIASTOLIC BLOOD PRESSURE: 72 MMHG | SYSTOLIC BLOOD PRESSURE: 118 MMHG

## 2019-10-10 DIAGNOSIS — I25.10 CORONARY ARTERY DISEASE INVOLVING NATIVE HEART WITHOUT ANGINA PECTORIS, UNSPECIFIED VESSEL OR LESION TYPE: ICD-10-CM

## 2019-10-10 DIAGNOSIS — E11.622 TYPE 2 DIABETES MELLITUS WITH OTHER SKIN ULCER, WITH LONG-TERM CURRENT USE OF INSULIN (HCC): ICD-10-CM

## 2019-10-10 DIAGNOSIS — N18.30 CKD (CHRONIC KIDNEY DISEASE) STAGE 3, GFR 30-59 ML/MIN (HCC): Primary | ICD-10-CM

## 2019-10-10 DIAGNOSIS — Z79.4 TYPE 2 DIABETES MELLITUS WITH OTHER SKIN ULCER, WITH LONG-TERM CURRENT USE OF INSULIN (HCC): ICD-10-CM

## 2019-10-10 PROCEDURE — 99214 OFFICE O/P EST MOD 30 MIN: CPT | Performed by: INTERNAL MEDICINE

## 2019-10-10 RX ORDER — CLOPIDOGREL BISULFATE 75 MG/1
75 TABLET ORAL DAILY
Status: ON HOLD | COMMUNITY
End: 2020-06-29 | Stop reason: CLARIF

## 2019-10-10 RX ORDER — FUROSEMIDE 20 MG/1
TABLET ORAL
Qty: 90 TABLET | Refills: 1 | Status: SHIPPED | OUTPATIENT
Start: 2019-10-10 | End: 2020-04-24 | Stop reason: SDUPTHER

## 2019-10-10 RX ORDER — ISOSORBIDE MONONITRATE 30 MG/1
30 TABLET, EXTENDED RELEASE ORAL DAILY
COMMUNITY
End: 2020-03-03 | Stop reason: SDUPTHER

## 2019-10-10 NOTE — PATIENT INSTRUCTIONS
The furosemide in Lasix your water pill take it as needed for edema increasing weight    You stop the lisinopril which is a good idea    Check her basic metabolic panel in 3-91 day    Will see her back in 4 weeks

## 2019-10-10 NOTE — PROGRESS NOTES
Assessment/Plan:  Medication change stop the lisinopril which I agree Lasix only to take on a p r n  Basis 20 mg increase in edema or weight basic metabolic panel to be check in 1 week    Problem 1  Chronic renal failure approaching stage IV  He reviewing the renal function has not change she really since August   After April he started deteriorating lisinopril was stop which I think is a great idea because he has little protein in the urine  His blood pressure is control he needs to continue to watch his blood sugars carefully  He has normal ventricular function I will stop the Lasix and only take it as needed  Had no history of heart failure  He knows if he gains weight or he has edema he can take an Lasix at that time  That may improve his renal function    Blood pressure is well control no headache chest pain palpitation shortness of breath    Coronary artery disease just had a cardiac catheterization and stents put N that my affect the renal function also  He has no and angina  Will see him 5 back in 4 weeks he also has Nephrology backup    No problem-specific Assessment & Plan notes found for this encounter  Diagnoses and all orders for this visit:    CKD (chronic kidney disease) stage 3, GFR 30-59 ml/min ([facility])  -     Basic metabolic panel; Future    Coronary artery disease involving native heart without angina pectoris, unspecified vessel or lesion type  -     furosemide (LASIX) 20 mg tablet; Take 1 tablet as needed for edema  -     Basic metabolic panel; Future    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, with long-term current use of insulin ([facility])  -     Basic metabolic panel; Future    Other orders  -     isosorbide mononitrate (IMDUR) 30 mg 24 hr tablet; Take 30 mg by mouth daily Take 3 tablets daily  -     clopidogrel (PLAVIX) 75 mg tablet; Take 75 mg by mouth daily          Subjective:      Patient ID: Alicia Lowry is a 66 y o  male      Chief Complaint   Patient presents with   Edwin Pitts Follow-up     elecvated BUN & creatinine- wants to go over meds  DISCUSS FLU VACCINE  Katty Gillismelissa Current Outpatient Medications:     ALPRAZolam (XANAX) 0 25 mg tablet, At twice a day as needed for anxiety, Disp: 30 tablet, Rfl: 0    aspirin 81 MG tablet, Take 81 mg by mouth daily  , Disp: , Rfl:     Azelastine HCl 0 15 % SOLN, Inhale 1 spray 2 (two) times a day, Disp: , Rfl: 0    bimatoprost (LUMIGAN) 0 01 % ophthalmic drops, 1 drop daily at bedtime  , Disp: , Rfl:     bismuth subsalicylate (PEPTO BISMOL) 262 MG chewable tablet, Chew 524 mg daily as needed for indigestion  , Disp: , Rfl:     clopidogrel (PLAVIX) 75 mg tablet, Take 75 mg by mouth daily, Disp: , Rfl:     fluocinonide (LIDEX) 0 05 % cream, Apply topically 2 (two) times a day , Disp: , Rfl:     furosemide (LASIX) 20 mg tablet, Take 1 tablet as needed for edema, Disp: 90 tablet, Rfl: 1    gabapentin (NEURONTIN) 300 mg capsule, TAKE 2 CAPSULES BY MOUTH EVERY DAY AT BEDTIME, Disp: 180 capsule, Rfl: 1    Insulin Pen Needle 31G X 8 MM MISC, Inject 3 times a day, Disp: 100 each, Rfl: 2    isosorbide mononitrate (IMDUR) 30 mg 24 hr tablet, Take 30 mg by mouth daily Take 3 tablets daily, Disp: , Rfl:     LANTUS SOLOSTAR 100 units/mL injection pen, 30 units qhs (Patient taking differently: 26-28 units qhs), Disp: 15 pen, Rfl: 3    loperamide (IMODIUM) 2 mg capsule, Take by mouth, Disp: , Rfl:     metoprolol succinate (TOPROL-XL) 100 mg 24 hr tablet, Take 100 mg by mouth daily  , Disp: , Rfl:     multivitamin (THERAGRAN) TABS, Take 1 tablet by mouth daily  , Disp: , Rfl:     NOVOLOG FLEXPEN 100 units/mL injection pen, INJECT UP TO 50 UNITS DAILY SUB CUT (Patient taking differently: Inject 15 Units under the skin 3 (three) times a day with meals ), Disp: 15 pen, Rfl: 1    omeprazole (PriLOSEC) 20 mg delayed release capsule, Take 1 capsule (20 mg total) by mouth daily, Disp: 90 capsule, Rfl: 1    Cholecalciferol 24846 units capsule, Take 50,000 Units by mouth every 30 (thirty) days, Disp: , Rfl:     loratadine (CLARITIN) 10 mg tablet, Take 1 tablet (10 mg total) by mouth daily for 15 days, Disp: 30 tablet, Rfl: 1    HPI    The following portions of the patient's history were reviewed and updated as appropriate: allergies, current medications, past family history, past medical history, past social history, past surgical history and problem list     Review of Systems   Constitutional: Negative  Negative for activity change, appetite change, fatigue, fever and unexpected weight change  HENT: Negative for congestion, ear pain, hearing loss, mouth sores, postnasal drip, rhinorrhea, sore throat, trouble swallowing and voice change  Eyes: Negative for pain, redness and visual disturbance  Respiratory: Negative for cough, chest tightness, shortness of breath and wheezing  Cardiovascular: Negative for chest pain, palpitations and leg swelling  Gastrointestinal: Negative for abdominal distention, abdominal pain, blood in stool, constipation, diarrhea and nausea  Endocrine: Negative for cold intolerance, heat intolerance, polydipsia, polyphagia and polyuria  Genitourinary: Negative for difficulty urinating, dysuria, flank pain, frequency, hematuria and urgency  Musculoskeletal: Negative for arthralgias, back pain, gait problem, joint swelling and myalgias  Skin: Negative for color change and pallor  Neurological: Negative for dizziness, tremors, seizures, syncope, weakness, numbness and headaches  Hematological: Negative for adenopathy  Does not bruise/bleed easily  Psychiatric/Behavioral: Negative  Negative for sleep disturbance  The patient is not nervous/anxious            Objective:    Results for orders placed or performed in visit on 09/26/19   Protein / creatinine ratio, urine   Result Value Ref Range    EXTERNAL Ur Prot/Creat Ratio 1 84        /72 (BP Location: Left arm, Patient Position: Sitting, Cuff Size: Standard)   Pulse 65 Temp 97 7 °F (36 5 °C)   Resp 14   Ht 6' 4" (1 93 m)   Wt 110 kg (243 lb)   BMI 29 58 kg/m²      Physical Exam   Constitutional: He is oriented to person, place, and time  He appears well-developed and well-nourished  HENT:   Head: Normocephalic  Right Ear: External ear normal    Left Ear: External ear normal    Nose: Nose normal    Mouth/Throat: Oropharynx is clear and moist  No oropharyngeal exudate  Eyes: Pupils are equal, round, and reactive to light  Conjunctivae and EOM are normal    Neck: Normal range of motion  Neck supple  No thyromegaly present  Cardiovascular: Normal rate, regular rhythm, normal heart sounds and intact distal pulses  Exam reveals no gallop and no friction rub  No murmur heard  S1-S2 regular rhythm  Extremities no edema blood pressure is 130/80   Pulmonary/Chest: Effort normal and breath sounds normal  No respiratory distress  He has no wheezes  He has no rales  Lungs are clear no wheezing rales or rhonchi   Abdominal: Soft  Bowel sounds are normal  He exhibits no distension and no mass  There is no tenderness  There is no rebound and no guarding  Musculoskeletal: Normal range of motion  Lymphadenopathy:     He has no cervical adenopathy  Neurological: He is alert and oriented to person, place, and time  Skin: Skin is warm and dry  Psychiatric: He has a normal mood and affect  His behavior is normal  Judgment normal    Nursing note and vitals reviewed

## 2019-10-30 ENCOUNTER — APPOINTMENT (OUTPATIENT)
Dept: LAB | Facility: CLINIC | Age: 74
End: 2019-10-30
Payer: MEDICARE

## 2019-10-30 DIAGNOSIS — E11.622 TYPE 2 DIABETES MELLITUS WITH OTHER SKIN ULCER, WITH LONG-TERM CURRENT USE OF INSULIN (HCC): ICD-10-CM

## 2019-10-30 DIAGNOSIS — Z79.4 TYPE 2 DIABETES MELLITUS WITH OTHER SKIN ULCER, WITH LONG-TERM CURRENT USE OF INSULIN (HCC): ICD-10-CM

## 2019-10-30 DIAGNOSIS — N18.30 CKD (CHRONIC KIDNEY DISEASE) STAGE 3, GFR 30-59 ML/MIN (HCC): ICD-10-CM

## 2019-10-30 DIAGNOSIS — I25.10 CORONARY ARTERY DISEASE INVOLVING NATIVE HEART WITHOUT ANGINA PECTORIS, UNSPECIFIED VESSEL OR LESION TYPE: ICD-10-CM

## 2019-10-30 LAB
ANION GAP SERPL CALCULATED.3IONS-SCNC: 7 MMOL/L (ref 4–13)
BUN SERPL-MCNC: 38 MG/DL (ref 5–25)
CALCIUM SERPL-MCNC: 8.8 MG/DL (ref 8.3–10.1)
CHLORIDE SERPL-SCNC: 107 MMOL/L (ref 100–108)
CO2 SERPL-SCNC: 24 MMOL/L (ref 21–32)
CREAT SERPL-MCNC: 2.51 MG/DL (ref 0.6–1.3)
GFR SERPL CREATININE-BSD FRML MDRD: 28 ML/MIN/1.73SQ M
GLUCOSE SERPL-MCNC: 225 MG/DL (ref 65–140)
POTASSIUM SERPL-SCNC: 4.5 MMOL/L (ref 3.5–5.3)
SODIUM SERPL-SCNC: 138 MMOL/L (ref 136–145)

## 2019-10-30 PROCEDURE — 80048 BASIC METABOLIC PNL TOTAL CA: CPT

## 2019-10-30 PROCEDURE — 36415 COLL VENOUS BLD VENIPUNCTURE: CPT

## 2019-10-31 DIAGNOSIS — E11.8 TYPE 2 DIABETES MELLITUS WITH COMPLICATION (HCC): ICD-10-CM

## 2019-11-01 ENCOUNTER — OFFICE VISIT (OUTPATIENT)
Dept: INTERNAL MEDICINE CLINIC | Facility: CLINIC | Age: 74
End: 2019-11-01
Payer: MEDICARE

## 2019-11-01 VITALS
WEIGHT: 243 LBS | BODY MASS INDEX: 29.59 KG/M2 | HEART RATE: 80 BPM | TEMPERATURE: 97.3 F | HEIGHT: 76 IN | RESPIRATION RATE: 13 BRPM | SYSTOLIC BLOOD PRESSURE: 130 MMHG | DIASTOLIC BLOOD PRESSURE: 80 MMHG

## 2019-11-01 DIAGNOSIS — E78.5 HYPERLIPIDEMIA, UNSPECIFIED HYPERLIPIDEMIA TYPE: ICD-10-CM

## 2019-11-01 DIAGNOSIS — I25.10 CORONARY ARTERY DISEASE INVOLVING NATIVE CORONARY ARTERY OF NATIVE HEART WITHOUT ANGINA PECTORIS: ICD-10-CM

## 2019-11-01 DIAGNOSIS — I10 ESSENTIAL HYPERTENSION: ICD-10-CM

## 2019-11-01 DIAGNOSIS — E11.622 TYPE 2 DIABETES MELLITUS WITH OTHER SKIN ULCER, WITHOUT LONG-TERM CURRENT USE OF INSULIN (HCC): ICD-10-CM

## 2019-11-01 DIAGNOSIS — Z79.4 TYPE 2 DIABETES MELLITUS WITH DIABETIC PERIPHERAL ANGIOPATHY WITHOUT GANGRENE, WITH LONG-TERM CURRENT USE OF INSULIN (HCC): Primary | ICD-10-CM

## 2019-11-01 DIAGNOSIS — J42 CHRONIC BRONCHITIS, UNSPECIFIED CHRONIC BRONCHITIS TYPE (HCC): ICD-10-CM

## 2019-11-01 DIAGNOSIS — N18.30 CKD (CHRONIC KIDNEY DISEASE) STAGE 3, GFR 30-59 ML/MIN (HCC): ICD-10-CM

## 2019-11-01 DIAGNOSIS — E11.51 TYPE 2 DIABETES MELLITUS WITH DIABETIC PERIPHERAL ANGIOPATHY WITHOUT GANGRENE, WITH LONG-TERM CURRENT USE OF INSULIN (HCC): Primary | ICD-10-CM

## 2019-11-01 PROCEDURE — 99214 OFFICE O/P EST MOD 30 MIN: CPT | Performed by: INTERNAL MEDICINE

## 2019-11-01 NOTE — PATIENT INSTRUCTIONS
The day of surgery the following medications    Nothing for diabetes no insulin the day of surgery    The day before surgery reduce the Lantus dose to 20 units at bedtime  Xanax 0 25  Aspirin 81  Plavix 75  Lasix 20 mg specially if you're still have some edema in her legs  Isosorbide mononitrate 30 mg  Metoprolol succinate 100 mg    Will see her back in about 4-6 weeks for follow-up to check her CBC and renal function

## 2019-11-01 NOTE — PROGRESS NOTES
Assessment/Plan:  Going for cysto surgery bladder cancer recurrence was cleared by Cardiology had recent stents put in is allowed to take Plavix and aspirin that was discussed with him  Prior to surgery medication review    Problem 1  High blood pressure well controlled no chest palpitation shortness of breath  Problem 2  Diabetes being followed by endocrinology no episodes of hypoglycemia  The day prior to surgery the Lantus was adjusted  No diabetic medication the day of surgery at went over that with him  Problem 3  Coronary artery disease recent drug-eluting stent put in committed on dual antiplatelet agent Plavix and aspirin for at least a year review with him he needs to continue that and take it even though the day of surgery    Problem 4  Chronic renal failure stage 4 renal function deteriorated specially since he had the diet for cardiac catheterization is going to see the nephrologist soon he has stone right side of flank tenderness I am sure he is going to get a urinalysis and follow this up with him  He appears nontoxic does not have any dysuria or frequency or bladder tenderness he does have right-sided flank tenderness  Problem 5  Anxiety told her to takes Xanax the day of surgery and as needed  Problem 6  Edema and shortness of breath he appears euvolemic he has been taking Lasix almost every day because he is not his legs are swollen  The day of surgery will take the following medications  Xanax 0 25 mg  Aspirin 81  Plavix 75   Lasix 20 mg specially she still have edema  Isosorbide mononitrate 30 mg  Metoprolol succinate 100 mg    No diabetic medication the day of surgery  The day before surgery reduce the Lantus to 20 units at bedtime        No problem-specific Assessment & Plan notes found for this encounter  There are no diagnoses linked to this encounter  Subjective:      Patient ID: Valerie Simpson is a 66 y o  male      No chief complaint on file         Current Outpatient Medications:     ALPRAZolam (XANAX) 0 25 mg tablet, At twice a day as needed for anxiety, Disp: 30 tablet, Rfl: 0    aspirin 81 MG tablet, Take 81 mg by mouth daily  , Disp: , Rfl:     Azelastine HCl 0 15 % SOLN, Inhale 1 spray 2 (two) times a day, Disp: , Rfl: 0    bimatoprost (LUMIGAN) 0 01 % ophthalmic drops, 1 drop daily at bedtime  , Disp: , Rfl:     bismuth subsalicylate (PEPTO BISMOL) 262 MG chewable tablet, Chew 524 mg daily as needed for indigestion  , Disp: , Rfl:     Cholecalciferol 24153 units capsule, Take 50,000 Units by mouth every 30 (thirty) days, Disp: , Rfl:     clopidogrel (PLAVIX) 75 mg tablet, Take 75 mg by mouth daily, Disp: , Rfl:     fluocinonide (LIDEX) 0 05 % cream, Apply topically 2 (two) times a day , Disp: , Rfl:     furosemide (LASIX) 20 mg tablet, Take 1 tablet as needed for edema, Disp: 90 tablet, Rfl: 1    gabapentin (NEURONTIN) 300 mg capsule, TAKE 2 CAPSULES BY MOUTH EVERY DAY AT BEDTIME, Disp: 180 capsule, Rfl: 1    Insulin Pen Needle 31G X 8 MM MISC, Inject 3 times a day, Disp: 100 each, Rfl: 2    isosorbide mononitrate (IMDUR) 30 mg 24 hr tablet, Take 30 mg by mouth daily Take 3 tablets daily, Disp: , Rfl:     LANTUS SOLOSTAR 100 units/mL injection pen, 30 units qhs (Patient taking differently: 26-28 units qhs), Disp: 15 pen, Rfl: 3    loperamide (IMODIUM) 2 mg capsule, Take by mouth, Disp: , Rfl:     loratadine (CLARITIN) 10 mg tablet, Take 1 tablet (10 mg total) by mouth daily for 15 days, Disp: 30 tablet, Rfl: 1    metFORMIN (GLUCOPHAGE) 1000 MG tablet, TAKE 1/2 OF A TABLET (500 MG TOTAL) BY MOUTH DAILY WITH BREAKFAST, Disp: 45 tablet, Rfl: 1    metoprolol succinate (TOPROL-XL) 100 mg 24 hr tablet, Take 100 mg by mouth daily  , Disp: , Rfl:     multivitamin (THERAGRAN) TABS, Take 1 tablet by mouth daily  , Disp: , Rfl:     NOVOLOG FLEXPEN 100 units/mL injection pen, INJECT UP TO 50 UNITS DAILY SUB CUT (Patient taking differently: Inject 15 Units under the skin 3 (three) times a day with meals ), Disp: 15 pen, Rfl: 1    omeprazole (PriLOSEC) 20 mg delayed release capsule, Take 1 capsule (20 mg total) by mouth daily, Disp: 90 capsule, Rfl: 1    HPI    The following portions of the patient's history were reviewed and updated as appropriate: allergies, current medications, past family history, past medical history, past social history, past surgical history and problem list     Review of Systems   Constitutional: Negative  Negative for activity change, appetite change, fatigue, fever and unexpected weight change  HENT: Negative for congestion, ear pain, hearing loss, mouth sores, postnasal drip, rhinorrhea, sore throat, trouble swallowing and voice change  Eyes: Negative for pain, redness and visual disturbance  Respiratory: Negative for cough, chest tightness, shortness of breath and wheezing  Cardiovascular: Positive for leg swelling  Negative for chest pain and palpitations  Gastrointestinal: Negative for abdominal distention, abdominal pain, blood in stool, constipation, diarrhea and nausea  Endocrine: Negative for cold intolerance, heat intolerance, polydipsia, polyphagia and polyuria  Genitourinary: Positive for flank pain  Negative for difficulty urinating, dysuria, frequency, hematuria and urgency  Musculoskeletal: Negative for arthralgias, back pain, gait problem, joint swelling and myalgias  Skin: Negative for color change and pallor  Neurological: Negative for dizziness, tremors, seizures, syncope, weakness, numbness and headaches  Hematological: Negative for adenopathy  Does not bruise/bleed easily  Psychiatric/Behavioral: Negative  Negative for sleep disturbance  The patient is not nervous/anxious            Objective:    Results for orders placed or performed in visit on 83/77/50   Basic metabolic panel   Result Value Ref Range    Sodium 138 136 - 145 mmol/L    Potassium 4 5 3 5 - 5 3 mmol/L    Chloride 107 100 - 108 mmol/L    CO2 24 21 - 32 mmol/L    ANION GAP 7 4 - 13 mmol/L    BUN 38 (H) 5 - 25 mg/dL    Creatinine 2 51 (H) 0 60 - 1 30 mg/dL    Glucose 225 (H) 65 - 140 mg/dL    Calcium 8 8 8 3 - 10 1 mg/dL    eGFR 28 ml/min/1 73sq m       There were no vitals taken for this visit  Physical Exam   Constitutional: He is oriented to person, place, and time  He appears well-developed and well-nourished  HENT:   Head: Normocephalic  Right Ear: External ear normal    Left Ear: External ear normal    Nose: Nose normal    Mouth/Throat: Oropharynx is clear and moist  No oropharyngeal exudate  Eyes: Pupils are equal, round, and reactive to light  Conjunctivae and EOM are normal    Neck: Normal range of motion  Neck supple  No thyromegaly present  Cardiovascular: Normal rate, regular rhythm, normal heart sounds and intact distal pulses  Exam reveals no gallop and no friction rub  No murmur heard  S1-S2 regular rhythm  Extremities no edema   Pulmonary/Chest: Effort normal and breath sounds normal  No respiratory distress  He has no wheezes  He has no rales  Lungs are clear no wheezing rales or rhonchi   Abdominal: Soft  Bowel sounds are normal  He exhibits no distension and no mass  There is no tenderness  There is no rebound and no guarding  Abdomen soft nontender no flank tenderness on the right no suprapubic tenderness   Musculoskeletal: Normal range of motion  Lymphadenopathy:     He has no cervical adenopathy  Neurological: He is alert and oriented to person, place, and time  Skin: Skin is warm and dry  Psychiatric: He has a normal mood and affect  His behavior is normal  Judgment normal    Nursing note and vitals reviewed

## 2019-11-13 ENCOUNTER — TELEPHONE (OUTPATIENT)
Dept: ENDOCRINOLOGY | Facility: HOSPITAL | Age: 74
End: 2019-11-13

## 2019-12-10 ENCOUNTER — TRANSCRIBE ORDERS (OUTPATIENT)
Dept: LAB | Facility: CLINIC | Age: 74
End: 2019-12-10

## 2019-12-10 ENCOUNTER — APPOINTMENT (OUTPATIENT)
Dept: LAB | Facility: CLINIC | Age: 74
End: 2019-12-10
Payer: MEDICARE

## 2019-12-10 DIAGNOSIS — I25.10 CORONARY ARTERY DISEASE INVOLVING NATIVE CORONARY ARTERY OF NATIVE HEART WITHOUT ANGINA PECTORIS: ICD-10-CM

## 2019-12-10 DIAGNOSIS — Z79.4 TYPE 2 DIABETES MELLITUS WITH DIABETIC PERIPHERAL ANGIOPATHY WITHOUT GANGRENE, WITH LONG-TERM CURRENT USE OF INSULIN (HCC): ICD-10-CM

## 2019-12-10 DIAGNOSIS — E11.51 TYPE 2 DIABETES MELLITUS WITH DIABETIC PERIPHERAL ANGIOPATHY WITHOUT GANGRENE, WITH LONG-TERM CURRENT USE OF INSULIN (HCC): ICD-10-CM

## 2019-12-10 LAB
ANION GAP SERPL CALCULATED.3IONS-SCNC: 6 MMOL/L (ref 4–13)
BASOPHILS # BLD AUTO: 0.03 THOUSANDS/ΜL (ref 0–0.1)
BASOPHILS NFR BLD AUTO: 0 % (ref 0–1)
BUN SERPL-MCNC: 35 MG/DL (ref 5–25)
CALCIUM SERPL-MCNC: 9 MG/DL (ref 8.3–10.1)
CHLORIDE SERPL-SCNC: 106 MMOL/L (ref 100–108)
CO2 SERPL-SCNC: 26 MMOL/L (ref 21–32)
CREAT SERPL-MCNC: 2.39 MG/DL (ref 0.6–1.3)
EOSINOPHIL # BLD AUTO: 0.22 THOUSAND/ΜL (ref 0–0.61)
EOSINOPHIL NFR BLD AUTO: 3 % (ref 0–6)
ERYTHROCYTE [DISTWIDTH] IN BLOOD BY AUTOMATED COUNT: 13.2 % (ref 11.6–15.1)
GFR SERPL CREATININE-BSD FRML MDRD: 29 ML/MIN/1.73SQ M
GLUCOSE SERPL-MCNC: 276 MG/DL (ref 65–140)
HCT VFR BLD AUTO: 34.4 % (ref 36.5–49.3)
HGB BLD-MCNC: 11.4 G/DL (ref 12–17)
IMM GRANULOCYTES # BLD AUTO: 0.01 THOUSAND/UL (ref 0–0.2)
IMM GRANULOCYTES NFR BLD AUTO: 0 % (ref 0–2)
LYMPHOCYTES # BLD AUTO: 1.82 THOUSANDS/ΜL (ref 0.6–4.47)
LYMPHOCYTES NFR BLD AUTO: 23 % (ref 14–44)
MAGNESIUM SERPL-MCNC: 2.1 MG/DL (ref 1.6–2.6)
MCH RBC QN AUTO: 31.1 PG (ref 26.8–34.3)
MCHC RBC AUTO-ENTMCNC: 33.1 G/DL (ref 31.4–37.4)
MCV RBC AUTO: 94 FL (ref 82–98)
MONOCYTES # BLD AUTO: 0.72 THOUSAND/ΜL (ref 0.17–1.22)
MONOCYTES NFR BLD AUTO: 9 % (ref 4–12)
NEUTROPHILS # BLD AUTO: 5.06 THOUSANDS/ΜL (ref 1.85–7.62)
NEUTS SEG NFR BLD AUTO: 65 % (ref 43–75)
NRBC BLD AUTO-RTO: 0 /100 WBCS
NT-PROBNP SERPL-MCNC: 144 PG/ML
PLATELET # BLD AUTO: 145 THOUSANDS/UL (ref 149–390)
PMV BLD AUTO: 11.8 FL (ref 8.9–12.7)
POTASSIUM SERPL-SCNC: 4.6 MMOL/L (ref 3.5–5.3)
RBC # BLD AUTO: 3.66 MILLION/UL (ref 3.88–5.62)
SODIUM SERPL-SCNC: 138 MMOL/L (ref 136–145)
WBC # BLD AUTO: 7.86 THOUSAND/UL (ref 4.31–10.16)

## 2019-12-10 PROCEDURE — 80048 BASIC METABOLIC PNL TOTAL CA: CPT

## 2019-12-10 PROCEDURE — 36415 COLL VENOUS BLD VENIPUNCTURE: CPT

## 2019-12-10 PROCEDURE — 83735 ASSAY OF MAGNESIUM: CPT

## 2019-12-10 PROCEDURE — 85025 COMPLETE CBC W/AUTO DIFF WBC: CPT

## 2019-12-10 PROCEDURE — 83880 ASSAY OF NATRIURETIC PEPTIDE: CPT

## 2019-12-11 ENCOUNTER — OFFICE VISIT (OUTPATIENT)
Dept: INTERNAL MEDICINE CLINIC | Facility: CLINIC | Age: 74
End: 2019-12-11
Payer: MEDICARE

## 2019-12-11 VITALS
WEIGHT: 244 LBS | DIASTOLIC BLOOD PRESSURE: 76 MMHG | BODY MASS INDEX: 29.71 KG/M2 | HEART RATE: 80 BPM | HEIGHT: 76 IN | SYSTOLIC BLOOD PRESSURE: 116 MMHG | TEMPERATURE: 97.3 F | RESPIRATION RATE: 12 BRPM

## 2019-12-11 DIAGNOSIS — E11.622 TYPE 2 DIABETES MELLITUS WITH OTHER SKIN ULCER, WITH LONG-TERM CURRENT USE OF INSULIN (HCC): ICD-10-CM

## 2019-12-11 DIAGNOSIS — Z79.4 TYPE 2 DIABETES MELLITUS WITH DIABETIC PERIPHERAL ANGIOPATHY WITHOUT GANGRENE, WITH LONG-TERM CURRENT USE OF INSULIN (HCC): Primary | ICD-10-CM

## 2019-12-11 DIAGNOSIS — E78.5 HYPERLIPIDEMIA, UNSPECIFIED HYPERLIPIDEMIA TYPE: ICD-10-CM

## 2019-12-11 DIAGNOSIS — J42 CHRONIC BRONCHITIS, UNSPECIFIED CHRONIC BRONCHITIS TYPE (HCC): ICD-10-CM

## 2019-12-11 DIAGNOSIS — N18.30 CKD (CHRONIC KIDNEY DISEASE) STAGE 3, GFR 30-59 ML/MIN (HCC): ICD-10-CM

## 2019-12-11 DIAGNOSIS — Z79.4 TYPE 2 DIABETES MELLITUS WITH OTHER SKIN ULCER, WITH LONG-TERM CURRENT USE OF INSULIN (HCC): ICD-10-CM

## 2019-12-11 DIAGNOSIS — E11.51 TYPE 2 DIABETES MELLITUS WITH DIABETIC PERIPHERAL ANGIOPATHY WITHOUT GANGRENE, WITH LONG-TERM CURRENT USE OF INSULIN (HCC): Primary | ICD-10-CM

## 2019-12-11 DIAGNOSIS — I25.10 CORONARY ARTERY DISEASE INVOLVING NATIVE CORONARY ARTERY OF NATIVE HEART WITHOUT ANGINA PECTORIS: ICD-10-CM

## 2019-12-11 PROCEDURE — 99214 OFFICE O/P EST MOD 30 MIN: CPT | Performed by: INTERNAL MEDICINE

## 2019-12-11 NOTE — PATIENT INSTRUCTIONS
We went over the you're renal function actually improved a little bit which is good the instructions as the same watch the medications you take over-the-counter did not take any Motrin or Aleve which will aggravate her kidney function  Blood pressure control  Diabetes follow-up with endocrinology  You cut down your metformin to half a tablet a day and that is reasonable to continue that at this time   Will see her back in 8 weeks    The new vaccine to prevent herpes zoster the shingles call  [address] can get that from your local pharmacy  I believe uric candidate for the back seen

## 2019-12-18 LAB
LEFT EYE DIABETIC RETINOPATHY: NORMAL
RIGHT EYE DIABETIC RETINOPATHY: NORMAL

## 2019-12-26 ENCOUNTER — HOSPITAL ENCOUNTER (OUTPATIENT)
Dept: RADIOLOGY | Facility: HOSPITAL | Age: 74
Discharge: HOME | End: 2019-12-26
Payer: MEDICARE

## 2019-12-26 ENCOUNTER — TRANSCRIBE ORDERS (OUTPATIENT)
Dept: ADMINISTRATIVE | Facility: HOSPITAL | Age: 74
End: 2019-12-26

## 2019-12-26 DIAGNOSIS — M48.062 SPINAL STENOSIS, LUMBAR REGION WITH NEUROGENIC CLAUDICATION: Primary | ICD-10-CM

## 2019-12-26 DIAGNOSIS — M54.16 LUMBAR RADICULAR PAIN: ICD-10-CM

## 2019-12-26 DIAGNOSIS — M48.062 SPINAL STENOSIS, LUMBAR REGION WITH NEUROGENIC CLAUDICATION: ICD-10-CM

## 2019-12-26 PROCEDURE — 72120 X-RAY BEND ONLY L-S SPINE: CPT

## 2019-12-30 ENCOUNTER — TELEPHONE (OUTPATIENT)
Dept: INTERNAL MEDICINE CLINIC | Facility: CLINIC | Age: 74
End: 2019-12-30

## 2020-01-24 ENCOUNTER — APPOINTMENT (OUTPATIENT)
Dept: LAB | Facility: CLINIC | Age: 75
End: 2020-01-24
Payer: MEDICARE

## 2020-01-24 DIAGNOSIS — I10 ESSENTIAL HYPERTENSION: ICD-10-CM

## 2020-01-24 DIAGNOSIS — E11.51 TYPE 2 DIABETES MELLITUS WITH DIABETIC PERIPHERAL ANGIOPATHY WITHOUT GANGRENE, WITH LONG-TERM CURRENT USE OF INSULIN (HCC): ICD-10-CM

## 2020-01-24 DIAGNOSIS — N18.30 CKD (CHRONIC KIDNEY DISEASE) STAGE 3, GFR 30-59 ML/MIN (HCC): ICD-10-CM

## 2020-01-24 DIAGNOSIS — R82.81 PYURIA: Primary | ICD-10-CM

## 2020-01-24 DIAGNOSIS — C67.9 MALIGNANT NEOPLASM OF URINARY BLADDER, UNSPECIFIED SITE (HCC): ICD-10-CM

## 2020-01-24 DIAGNOSIS — I25.10 CORONARY ARTERY DISEASE INVOLVING NATIVE CORONARY ARTERY OF NATIVE HEART WITHOUT ANGINA PECTORIS: ICD-10-CM

## 2020-01-24 DIAGNOSIS — E78.5 HYPERLIPIDEMIA, UNSPECIFIED HYPERLIPIDEMIA TYPE: ICD-10-CM

## 2020-01-24 DIAGNOSIS — Z79.4 TYPE 2 DIABETES MELLITUS WITH DIABETIC PERIPHERAL ANGIOPATHY WITHOUT GANGRENE, WITH LONG-TERM CURRENT USE OF INSULIN (HCC): ICD-10-CM

## 2020-01-24 DIAGNOSIS — I80.9 SUPERFICIAL PHLEBITIS: ICD-10-CM

## 2020-01-24 LAB
25(OH)D3 SERPL-MCNC: 35.6 NG/ML (ref 30–100)
ALBUMIN SERPL BCP-MCNC: 3.7 G/DL (ref 3.5–5)
ALP SERPL-CCNC: 98 U/L (ref 46–116)
ALT SERPL W P-5'-P-CCNC: 59 U/L (ref 12–78)
ANION GAP SERPL CALCULATED.3IONS-SCNC: 3 MMOL/L (ref 4–13)
AST SERPL W P-5'-P-CCNC: 26 U/L (ref 5–45)
BACTERIA UR QL AUTO: ABNORMAL /HPF
BASOPHILS # BLD AUTO: 0.03 THOUSANDS/ΜL (ref 0–0.1)
BASOPHILS NFR BLD AUTO: 0 % (ref 0–1)
BILIRUB SERPL-MCNC: 0.47 MG/DL (ref 0.2–1)
BILIRUB UR QL STRIP: NEGATIVE
BUN SERPL-MCNC: 34 MG/DL (ref 5–25)
CALCIUM SERPL-MCNC: 9 MG/DL (ref 8.3–10.1)
CHLORIDE SERPL-SCNC: 109 MMOL/L (ref 100–108)
CHOLEST SERPL-MCNC: 144 MG/DL (ref 50–200)
CLARITY UR: ABNORMAL
CO2 SERPL-SCNC: 26 MMOL/L (ref 21–32)
COLOR UR: YELLOW
CREAT SERPL-MCNC: 2.52 MG/DL (ref 0.6–1.3)
CREAT UR-MCNC: 127 MG/DL
CRP SERPL QL: <3 MG/L
D DIMER PPP FEU-MCNC: 0.64 UG/ML FEU
EOSINOPHIL # BLD AUTO: 0.24 THOUSAND/ΜL (ref 0–0.61)
EOSINOPHIL NFR BLD AUTO: 3 % (ref 0–6)
ERYTHROCYTE [DISTWIDTH] IN BLOOD BY AUTOMATED COUNT: 13.2 % (ref 11.6–15.1)
ERYTHROCYTE [SEDIMENTATION RATE] IN BLOOD: 23 MM/HOUR (ref 0–10)
EST. AVERAGE GLUCOSE BLD GHB EST-MCNC: 209 MG/DL
GFR SERPL CREATININE-BSD FRML MDRD: 28 ML/MIN/1.73SQ M
GGT SERPL-CCNC: 65 U/L (ref 5–85)
GLUCOSE P FAST SERPL-MCNC: 133 MG/DL (ref 65–99)
GLUCOSE UR STRIP-MCNC: NEGATIVE MG/DL
HBA1C MFR BLD: 8.9 % (ref 4.2–6.3)
HCT VFR BLD AUTO: 35.5 % (ref 36.5–49.3)
HDLC SERPL-MCNC: 33 MG/DL
HGB BLD-MCNC: 11.8 G/DL (ref 12–17)
HGB UR QL STRIP.AUTO: ABNORMAL
HYALINE CASTS #/AREA URNS LPF: ABNORMAL /LPF
IMM GRANULOCYTES # BLD AUTO: 0.04 THOUSAND/UL (ref 0–0.2)
IMM GRANULOCYTES NFR BLD AUTO: 1 % (ref 0–2)
KETONES UR STRIP-MCNC: NEGATIVE MG/DL
LDLC SERPL CALC-MCNC: 72 MG/DL (ref 0–100)
LEUKOCYTE ESTERASE UR QL STRIP: ABNORMAL
LYMPHOCYTES # BLD AUTO: 2.07 THOUSANDS/ΜL (ref 0.6–4.47)
LYMPHOCYTES NFR BLD AUTO: 27 % (ref 14–44)
MAGNESIUM SERPL-MCNC: 2.5 MG/DL (ref 1.6–2.6)
MCH RBC QN AUTO: 31.1 PG (ref 26.8–34.3)
MCHC RBC AUTO-ENTMCNC: 33.2 G/DL (ref 31.4–37.4)
MCV RBC AUTO: 93 FL (ref 82–98)
MICROALBUMIN UR-MCNC: 584 MG/L (ref 0–20)
MICROALBUMIN/CREAT 24H UR: 460 MG/G CREATININE (ref 0–30)
MONOCYTES # BLD AUTO: 0.58 THOUSAND/ΜL (ref 0.17–1.22)
MONOCYTES NFR BLD AUTO: 8 % (ref 4–12)
NEUTROPHILS # BLD AUTO: 4.79 THOUSANDS/ΜL (ref 1.85–7.62)
NEUTS SEG NFR BLD AUTO: 61 % (ref 43–75)
NITRITE UR QL STRIP: NEGATIVE
NON-SQ EPI CELLS URNS QL MICRO: ABNORMAL /HPF
NRBC BLD AUTO-RTO: 0 /100 WBCS
PH UR STRIP.AUTO: 6 [PH]
PLATELET # BLD AUTO: 158 THOUSANDS/UL (ref 149–390)
PMV BLD AUTO: 11.3 FL (ref 8.9–12.7)
POTASSIUM SERPL-SCNC: 5.1 MMOL/L (ref 3.5–5.3)
PROT SERPL-MCNC: 7.5 G/DL (ref 6.4–8.2)
PROT UR STRIP-MCNC: ABNORMAL MG/DL
RBC # BLD AUTO: 3.8 MILLION/UL (ref 3.88–5.62)
RBC #/AREA URNS AUTO: ABNORMAL /HPF
SODIUM SERPL-SCNC: 138 MMOL/L (ref 136–145)
SP GR UR STRIP.AUTO: 1.01 (ref 1–1.03)
TRIGL SERPL-MCNC: 194 MG/DL
TSH SERPL DL<=0.05 MIU/L-ACNC: 1.89 UIU/ML (ref 0.36–3.74)
UROBILINOGEN UR QL STRIP.AUTO: 0.2 E.U./DL
WBC # BLD AUTO: 7.75 THOUSAND/UL (ref 4.31–10.16)
WBC #/AREA URNS AUTO: ABNORMAL /HPF

## 2020-01-24 PROCEDURE — 80053 COMPREHEN METABOLIC PANEL: CPT

## 2020-01-24 PROCEDURE — 80061 LIPID PANEL: CPT

## 2020-01-24 PROCEDURE — 82043 UR ALBUMIN QUANTITATIVE: CPT | Performed by: INTERNAL MEDICINE

## 2020-01-24 PROCEDURE — 82306 VITAMIN D 25 HYDROXY: CPT

## 2020-01-24 PROCEDURE — 81001 URINALYSIS AUTO W/SCOPE: CPT | Performed by: INTERNAL MEDICINE

## 2020-01-24 PROCEDURE — 83735 ASSAY OF MAGNESIUM: CPT

## 2020-01-24 PROCEDURE — 84443 ASSAY THYROID STIM HORMONE: CPT

## 2020-01-24 PROCEDURE — 36415 COLL VENOUS BLD VENIPUNCTURE: CPT

## 2020-01-24 PROCEDURE — 85025 COMPLETE CBC W/AUTO DIFF WBC: CPT

## 2020-01-24 PROCEDURE — 82570 ASSAY OF URINE CREATININE: CPT | Performed by: INTERNAL MEDICINE

## 2020-01-24 PROCEDURE — 85652 RBC SED RATE AUTOMATED: CPT

## 2020-01-24 PROCEDURE — 86140 C-REACTIVE PROTEIN: CPT

## 2020-01-24 PROCEDURE — 85379 FIBRIN DEGRADATION QUANT: CPT

## 2020-01-24 PROCEDURE — 82977 ASSAY OF GGT: CPT

## 2020-01-24 PROCEDURE — 83036 HEMOGLOBIN GLYCOSYLATED A1C: CPT

## 2020-01-24 NOTE — TELEPHONE ENCOUNTER
Spoke with PT and he is aware of his results  Medication called into pharmacy  I will forward to urology

## 2020-01-24 NOTE — TELEPHONE ENCOUNTER
----- Message from Jose Armando Rivers MD sent at 1/24/2020  4:10 PM EST -----  Please call the patient regarding his abnormal result  pyuria  Hx bladder Ca  +  Sx  cipro 500mg  BID  5  Days  Copy  To  Urology  !!!  BRANDO RICE

## 2020-01-25 RX ORDER — CIPROFLOXACIN 500 MG/1
500 TABLET, FILM COATED ORAL EVERY 12 HOURS SCHEDULED
Qty: 10 TABLET | Refills: 0 | OUTPATIENT
Start: 2020-01-25 | End: 2020-01-30

## 2020-01-29 ENCOUNTER — TELEPHONE (OUTPATIENT)
Dept: ENDOCRINOLOGY | Facility: HOSPITAL | Age: 75
End: 2020-01-29

## 2020-01-29 DIAGNOSIS — Z79.4 TYPE 2 DIABETES MELLITUS WITH DIABETIC PERIPHERAL ANGIOPATHY WITHOUT GANGRENE, WITH LONG-TERM CURRENT USE OF INSULIN (HCC): Primary | ICD-10-CM

## 2020-01-29 DIAGNOSIS — E11.51 TYPE 2 DIABETES MELLITUS WITH DIABETIC PERIPHERAL ANGIOPATHY WITHOUT GANGRENE, WITH LONG-TERM CURRENT USE OF INSULIN (HCC): Primary | ICD-10-CM

## 2020-01-29 NOTE — TELEPHONE ENCOUNTER
Patient not seen today  Was 20 minutes late for his appt  Rescheduled for 4-27-20  Last blood work was done 1-24-20  Does he need to know anything about his lab results and what blood work do you you want done prior to his April appt? Mail lab slip to patient

## 2020-01-30 DIAGNOSIS — M54.50 CHRONIC LOW BACK PAIN WITHOUT SCIATICA, UNSPECIFIED BACK PAIN LATERALITY: ICD-10-CM

## 2020-01-30 DIAGNOSIS — G89.29 CHRONIC LOW BACK PAIN WITHOUT SCIATICA, UNSPECIFIED BACK PAIN LATERALITY: ICD-10-CM

## 2020-01-30 RX ORDER — GABAPENTIN 300 MG/1
CAPSULE ORAL
Qty: 180 CAPSULE | Refills: 1 | Status: SHIPPED | OUTPATIENT
Start: 2020-01-30 | End: 2020-08-18

## 2020-02-10 ENCOUNTER — TRANSCRIBE ORDERS (OUTPATIENT)
Dept: ADMINISTRATIVE | Facility: HOSPITAL | Age: 75
End: 2020-02-10

## 2020-02-10 ENCOUNTER — APPOINTMENT (OUTPATIENT)
Dept: LAB | Facility: HOSPITAL | Age: 75
End: 2020-02-10
Payer: MEDICARE

## 2020-02-10 DIAGNOSIS — R35.0 URINARY FREQUENCY: Primary | ICD-10-CM

## 2020-02-10 DIAGNOSIS — R35.0 URINARY FREQUENCY: ICD-10-CM

## 2020-02-10 LAB
BACTERIA UR QL AUTO: ABNORMAL /HPF
BILIRUB UR QL STRIP: NEGATIVE
CLARITY UR: ABNORMAL
COLOR UR: YELLOW
GLUCOSE UR STRIP-MCNC: ABNORMAL MG/DL
HGB UR QL STRIP.AUTO: ABNORMAL
KETONES UR STRIP-MCNC: NEGATIVE MG/DL
LEUKOCYTE ESTERASE UR QL STRIP: ABNORMAL
NITRITE UR QL STRIP: NEGATIVE
NON-SQ EPI CELLS URNS QL MICRO: ABNORMAL /HPF
PH UR STRIP.AUTO: 6 [PH]
PROT UR STRIP-MCNC: ABNORMAL MG/DL
RBC #/AREA URNS AUTO: ABNORMAL /HPF
SP GR UR STRIP.AUTO: 1.02 (ref 1–1.03)
UROBILINOGEN UR QL STRIP.AUTO: 0.2 E.U./DL
WBC #/AREA URNS AUTO: ABNORMAL /HPF

## 2020-02-10 PROCEDURE — 81001 URINALYSIS AUTO W/SCOPE: CPT | Performed by: SURGERY

## 2020-02-10 PROCEDURE — 87086 URINE CULTURE/COLONY COUNT: CPT

## 2020-02-11 LAB — BACTERIA UR CULT: NORMAL

## 2020-02-12 ENCOUNTER — OFFICE VISIT (OUTPATIENT)
Dept: INTERNAL MEDICINE CLINIC | Facility: CLINIC | Age: 75
End: 2020-02-12
Payer: MEDICARE

## 2020-02-12 VITALS
TEMPERATURE: 97.9 F | DIASTOLIC BLOOD PRESSURE: 84 MMHG | BODY MASS INDEX: 29.47 KG/M2 | HEART RATE: 80 BPM | HEIGHT: 76 IN | RESPIRATION RATE: 13 BRPM | SYSTOLIC BLOOD PRESSURE: 120 MMHG | WEIGHT: 242 LBS

## 2020-02-12 DIAGNOSIS — F32.9 REACTIVE DEPRESSION: ICD-10-CM

## 2020-02-12 DIAGNOSIS — C67.9 MALIGNANT NEOPLASM OF URINARY BLADDER, UNSPECIFIED SITE (HCC): ICD-10-CM

## 2020-02-12 DIAGNOSIS — I25.10 CORONARY ARTERY DISEASE INVOLVING NATIVE CORONARY ARTERY OF NATIVE HEART WITHOUT ANGINA PECTORIS: ICD-10-CM

## 2020-02-12 DIAGNOSIS — N18.30 CKD (CHRONIC KIDNEY DISEASE) STAGE 3, GFR 30-59 ML/MIN (HCC): ICD-10-CM

## 2020-02-12 DIAGNOSIS — I10 ESSENTIAL HYPERTENSION: ICD-10-CM

## 2020-02-12 DIAGNOSIS — E11.622 TYPE 2 DIABETES MELLITUS WITH OTHER SKIN ULCER, WITH LONG-TERM CURRENT USE OF INSULIN (HCC): ICD-10-CM

## 2020-02-12 DIAGNOSIS — E78.5 HYPERLIPIDEMIA, UNSPECIFIED HYPERLIPIDEMIA TYPE: ICD-10-CM

## 2020-02-12 DIAGNOSIS — N25.81 SECONDARY RENAL HYPERPARATHYROIDISM (HCC): ICD-10-CM

## 2020-02-12 DIAGNOSIS — E11.51 TYPE 2 DIABETES MELLITUS WITH DIABETIC PERIPHERAL ANGIOPATHY WITHOUT GANGRENE, WITH LONG-TERM CURRENT USE OF INSULIN (HCC): Primary | ICD-10-CM

## 2020-02-12 DIAGNOSIS — Z79.4 TYPE 2 DIABETES MELLITUS WITH OTHER SKIN ULCER, WITH LONG-TERM CURRENT USE OF INSULIN (HCC): ICD-10-CM

## 2020-02-12 DIAGNOSIS — Z79.4 TYPE 2 DIABETES MELLITUS WITH DIABETIC PERIPHERAL ANGIOPATHY WITHOUT GANGRENE, WITH LONG-TERM CURRENT USE OF INSULIN (HCC): Primary | ICD-10-CM

## 2020-02-12 DIAGNOSIS — I25.118 ATHEROSCLEROSIS OF NATIVE CORONARY ARTERY OF NATIVE HEART WITH STABLE ANGINA PECTORIS (HCC): ICD-10-CM

## 2020-02-12 DIAGNOSIS — N18.4 CHRONIC KIDNEY DISEASE, STAGE 4 (SEVERE) (HCC): ICD-10-CM

## 2020-02-12 DIAGNOSIS — J42 CHRONIC BRONCHITIS, UNSPECIFIED CHRONIC BRONCHITIS TYPE (HCC): ICD-10-CM

## 2020-02-12 PROCEDURE — G0438 PPPS, INITIAL VISIT: HCPCS | Performed by: INTERNAL MEDICINE

## 2020-02-12 PROCEDURE — 3008F BODY MASS INDEX DOCD: CPT | Performed by: INTERNAL MEDICINE

## 2020-02-12 PROCEDURE — 4040F PNEUMOC VAC/ADMIN/RCVD: CPT | Performed by: INTERNAL MEDICINE

## 2020-02-12 PROCEDURE — 99214 OFFICE O/P EST MOD 30 MIN: CPT | Performed by: INTERNAL MEDICINE

## 2020-02-12 PROCEDURE — 3060F POS MICROALBUMINURIA REV: CPT | Performed by: INTERNAL MEDICINE

## 2020-02-12 PROCEDURE — 1123F ACP DISCUSS/DSCN MKR DOCD: CPT | Performed by: INTERNAL MEDICINE

## 2020-02-12 PROCEDURE — 1160F RVW MEDS BY RX/DR IN RCRD: CPT | Performed by: INTERNAL MEDICINE

## 2020-02-12 PROCEDURE — 3079F DIAST BP 80-89 MM HG: CPT | Performed by: INTERNAL MEDICINE

## 2020-02-12 PROCEDURE — 3066F NEPHROPATHY DOC TX: CPT | Performed by: INTERNAL MEDICINE

## 2020-02-12 PROCEDURE — 3074F SYST BP LT 130 MM HG: CPT | Performed by: INTERNAL MEDICINE

## 2020-02-12 PROCEDURE — 1036F TOBACCO NON-USER: CPT | Performed by: INTERNAL MEDICINE

## 2020-02-12 PROCEDURE — 1170F FXNL STATUS ASSESSED: CPT | Performed by: INTERNAL MEDICINE

## 2020-02-12 PROCEDURE — 1125F AMNT PAIN NOTED PAIN PRSNT: CPT | Performed by: INTERNAL MEDICINE

## 2020-02-12 NOTE — PROGRESS NOTES
Depression Screening and Follow-up Plan: Patient's depression screening was positive with a PHQ-2 score of 3  Their PHQ-9 score was 12  Depression likely due to other medical condition  Will treat underlying condition  Patient advised to follow-up with PCP for further management  Going to start the patient on Zoloft 50 mg daily will going to see him back in 4 weeks for follow-up  Assessment/Plan: I started him on Zoloft for depression 50 mg daily  Cardiovascular risk score is 34 2 not on a statin? January 24, 2020 cholesterol 144 triglycerides 194 HDL 33 LDL 72    So off cardiologist at the lipid clinic at [facility] Dr Himanshu Vega if is LDL is greater than 100 will be create treated with thePCSKK 9  Inhibitor if his LDL is less than 100 that will add zetia     Office visit by me December 11, 2019    Problem 1  Coronary artery disease stable no angina  Appears euvolemic here  He is on the following medications  Metoprolol succinate 100 mg   Isosorbide mononitrate 30 mg continue same medication  Problem 2  Hyperlipidemia LDL is 72 on able to take statins being followed by cardiologist they recommended if the LDL is last then 100 should probably should start on  zetia  Will have Cardiology review the    Problem 3  Depression not suicidal discussion about salt off being using cardiac patient was start him on Zoloft 50 mg per day he gets anxious he can also takes Xanax with   It    Problem 4  History of bladder cancer just went through BCG treatments bladder  Has frequency using depends  Most likely from his bladder treatment  Problem 5  GERD on omeprazole    Problem 6  Chronic renal failure her with the GFR 28 bordering on stage IV  He was taking off metformin because of that but is been stable reviewing the labs for the last 4 months  No problem-specific Assessment & Plan notes found for this encounter  There are no diagnoses linked to this encounter        Subjective:      Patient ID: Messi Handley is a 66 y o  male  No chief complaint on file  Current Outpatient Medications:     ALPRAZolam (XANAX) 0 25 mg tablet, At twice a day as needed for anxiety, Disp: 30 tablet, Rfl: 0    aspirin 81 MG tablet, Take 81 mg by mouth daily  , Disp: , Rfl:     Azelastine HCl 0 15 % SOLN, Inhale 1 spray 2 (two) times a day, Disp: , Rfl: 0    bimatoprost (LUMIGAN) 0 01 % ophthalmic drops, 1 drop daily at bedtime  , Disp: , Rfl:     bismuth subsalicylate (PEPTO BISMOL) 262 MG chewable tablet, Chew 524 mg daily as needed for indigestion  , Disp: , Rfl:     Cholecalciferol 74405 units capsule, Take 50,000 Units by mouth every 30 (thirty) days, Disp: , Rfl:     clopidogrel (PLAVIX) 75 mg tablet, Take 75 mg by mouth daily, Disp: , Rfl:     fluocinonide (LIDEX) 0 05 % cream, Apply topically 2 (two) times a day , Disp: , Rfl:     furosemide (LASIX) 20 mg tablet, Take 1 tablet as needed for edema, Disp: 90 tablet, Rfl: 1    gabapentin (NEURONTIN) 300 mg capsule, TAKE 2 CAPSULES BY MOUTH EVERY DAY AT BEDTIME, Disp: 180 capsule, Rfl: 1    Insulin Pen Needle 31G X 8 MM MISC, Inject 3 times a day, Disp: 100 each, Rfl: 2    isosorbide mononitrate (IMDUR) 30 mg 24 hr tablet, Take 30 mg by mouth daily Take 3 tablets daily, Disp: , Rfl:     LANTUS SOLOSTAR 100 units/mL injection pen, 30 units qhs (Patient taking differently: 26-28 units qhs), Disp: 15 pen, Rfl: 3    loperamide (IMODIUM) 2 mg capsule, Take by mouth, Disp: , Rfl:     loratadine (CLARITIN) 10 mg tablet, Take 1 tablet (10 mg total) by mouth daily for 15 days, Disp: 30 tablet, Rfl: 1    metFORMIN (GLUCOPHAGE) 1000 MG tablet, TAKE 1/2 OF A TABLET (500 MG TOTAL) BY MOUTH DAILY WITH BREAKFAST, Disp: 45 tablet, Rfl: 1    metoprolol succinate (TOPROL-XL) 100 mg 24 hr tablet, Take 100 mg by mouth daily  , Disp: , Rfl:     multivitamin (THERAGRAN) TABS, Take 1 tablet by mouth daily  , Disp: , Rfl:     NOVOLOG FLEXPEN 100 units/mL injection pen, INJECT UP TO 50 UNITS DAILY SUB CUT (Patient taking differently: Inject 15 Units under the skin 3 (three) times a day with meals ), Disp: 15 pen, Rfl: 1    omeprazole (PriLOSEC) 20 mg delayed release capsule, Take 1 capsule (20 mg total) by mouth daily, Disp: 90 capsule, Rfl: 1    HPI    The following portions of the patient's history were reviewed and updated as appropriate: allergies, current medications, past family history, past medical history, past social history, past surgical history and problem list     Review of Systems   Constitutional: Negative  Negative for activity change, appetite change, fatigue, fever and unexpected weight change  HENT: Negative for congestion, ear pain, hearing loss, mouth sores, postnasal drip, rhinorrhea, sore throat, trouble swallowing and voice change  Eyes: Negative for pain, redness and visual disturbance  Respiratory: Negative for cough, chest tightness, shortness of breath and wheezing  Cardiovascular: Negative for chest pain, palpitations and leg swelling  Gastrointestinal: Negative for abdominal distention, abdominal pain, blood in stool, constipation, diarrhea and nausea  Endocrine: Negative for cold intolerance, heat intolerance, polydipsia, polyphagia and polyuria  Genitourinary: Positive for frequency  Negative for difficulty urinating, dysuria, flank pain and hematuria  Musculoskeletal: Positive for back pain  Negative for arthralgias, gait problem, joint swelling and myalgias  Skin: Negative for color change and pallor  Neurological: Negative for dizziness, tremors, seizures, syncope, weakness, numbness and headaches  Hematological: Negative for adenopathy  Does not bruise/bleed easily  Psychiatric/Behavioral: Negative  Negative for sleep disturbance  The patient is not nervous/anxious            Objective:    Results for orders placed or performed in visit on 02/10/20   Urine culture   Result Value Ref Range    Urine Culture >100,000 cfu/ml         There were no vitals taken for this visit  Physical Exam   Constitutional: He is oriented to person, place, and time  He appears well-developed and well-nourished  HENT:   Head: Normocephalic  Right Ear: External ear normal    Left Ear: External ear normal    Nose: Nose normal    Mouth/Throat: Oropharynx is clear and moist  No oropharyngeal exudate  Eyes: Pupils are equal, round, and reactive to light  Conjunctivae and EOM are normal    Neck: Normal range of motion  Neck supple  No thyromegaly present  Cardiovascular: Normal rate, regular rhythm, normal heart sounds and intact distal pulses  Exam reveals no gallop and no friction rub  No murmur heard  S1-S2 regular rhythm  Extremities no edema   Pulmonary/Chest: Effort normal and breath sounds normal  No respiratory distress  He has no wheezes  He has no rales  Lungs are clear no wheezing rales or rhonchi   Abdominal: Soft  Bowel sounds are normal  He exhibits no distension and no mass  There is no tenderness  There is no rebound and no guarding  Musculoskeletal: Normal range of motion  Lymphadenopathy:     He has no cervical adenopathy  Neurological: He is alert and oriented to person, place, and time  Skin: Skin is warm and dry  Psychiatric: His behavior is normal  Judgment normal  Inappropriate judgment: s multiple medical problems  He exhibits a depressed mood  He expresses no suicidal ideation  Depression triggered by h   Nursing note and vitals reviewed

## 2020-02-12 NOTE — PROGRESS NOTES
Assessment and Plan:     Problem List Items Addressed This Visit        Endocrine    Diabetes mellitus ([facility] [address] ) - Primary       Respiratory    Chronic obstructive pulmonary disease (HCC)       Cardiovascular and Mediastinum    CAD (coronary artery disease)    Essential hypertension       Genitourinary    CKD (chronic kidney disease) stage 3, GFR 30-59 ml/min (HCC)    Malignant tumor of urinary bladder (HCC)       Other    Hyperlipidemia        BMI Counseling: Body mass index is 29 46 kg/m²  The BMI is above normal  Nutrition recommendations include decreasing portion sizes, encouraging healthy choices of fruits and vegetables, decreasing fast food intake, consuming healthier snacks, limiting drinks that contain sugar, reducing intake of saturated and trans fat and reducing intake of cholesterol  Exercise recommendations include exercising 3-5 times per week  No pharmacotherapy was ordered  Patient referred to PCP due to patient being overweight  Depression Screening and Follow-up Plan: Patient's depression screening was positive with a PHQ-2 score of 3  Their PHQ-9 score was 12  Patient advised to follow-up with PCP for further management  Falls Plan of Care: Recommended assistive device to help with gait and balance  Vitamin D supplementation was recommended  Wants to hold off on physical therapy I offer physical therapy for gait and balance      Preventive health issues were discussed with patient, and age appropriate screening tests were ordered as noted in patient's After Visit Summary  Personalized health advice and appropriate referrals for health education or preventive services given if needed, as noted in patient's After Visit Summary       History of Present Illness:     Patient presents for Medicare Annual Wellness visit    Patient Care Team:  Mike Rosales MD as PCP - General  Fabiano Narvaez DO as PCP - Endocrinology (Endocrinology)  MD Evette Bernard MD Marsh Drown DO Suman Faria MD Darcey Hastings, MD     Problem List:     Patient Active Problem List   Diagnosis    CAD (coronary artery disease)    [facility]    Carcinoma in situ of bladder    Essential hypertension    Hyperlipidemia    Presence of stent in coronary artery    Diabetes mellitus ([facility] )    CKD (chronic kidney disease) stage 3, GFR 30-59 ml/min ([facility]    Primary osteoarthritis of left knee    Malignant tumor of urinary bladder ([facility] )    Urinary tract infection    Degeneration of lumbar intervertebral disc    Back pain    GARY (acute kidney injury) ([facility] )    Arteriosclerosis of artery of extremity ([facility] )    Stable angina pectoris ([place] [address] )    Herpes zoster without complication    Localized edema    Superficial phlebitis    Preop examination    Acute cystitis without hematuria      Past Medical and Surgical History:     Past Medical History:   Diagnosis Date    Anemia     Last assessed: 9/28/17    Arteriosclerotic cardiovascular disease     Last assessed: 9/28/17    Bladder cancer ([place] [address] )     Cardiac disease     Diabetes mellitus ([place] [address] )     Hypertension     Hypotension     Last assessed: 2/24/15    Insomnia     Last assessed: 11/14/12    Other seasonal allergic rhinitis     Last assessed: 2/10/16    Transient cerebral ischemia      Past Surgical History:   Procedure Laterality Date    CARDIAC SURGERY      Cath stent placement  Last assessed: 3/9/17  Interventional Catheterization    CHOLECYSTECTOMY      CYSTOSCOPY      Diagnostic w/biopsy  Jason Ann  Last assessed: 12/1/14    CYSTOURETHROSCOPY      w/cautery  Jason Ann    GASTRIC BYPASS      For morbid obesity w/Shaji-en-Y   Resolved: 11/17/09    INCISION AND DRAINAGE OF WOUND Right 2/26/2017    Procedure: INCISION AND DRAINAGE (I&D) EXTREMITY WITH APPLICATION OF GRAFT JACKET;  Surgeon: Stewart Leavitt DPM;  [facility];  Service:    [address] [place] [address]  DRAINAGE OF WOUND Right 4/25/2017    Procedure: INCISION AND DRAINAGE (I&D) EXTREMITY, APPLICATION OF GRAFT;  Surgeon: Alena Martel DPM;  [facility];  Service:     JOINT REPLACEMENT      Knee   Last assessed: 1/28/11    CO CYSTOURETHROSCOPY,BIOPSY N/A 8/16/2016    Procedure: Leatha Settles;  Surgeon: Sulma Nevarez MD;  [facility];  Service: Urology    ROTATOR CUFF REPAIR      SMALL INTESTINE SURGERY      Surgery Shaji-en-Y    SPINAL FUSION      VAC DRESSING APPLICATION Right 2/63/4054    Procedure: APPLICATION VAC DRESSING;  Surgeon: Alena Martel DPM;  [facility];  Service:       Family History:     Family History   Problem Relation Age of Onset    Diabetes Mother     Heart disease Mother     Other Mother         High blood pressure    Heart disease Father     Diabetes Sister     Other Sister         High blood pressure    Kidney disease Sister     Heart disease Brother     Other Brother         High blood pressure      Social History:        Social History     Socioeconomic History    Marital status: /Civil Union     Spouse name: None    Number of children: None    Years of education: None    Highest education level: None   Occupational History    None   Social Needs    Financial resource strain: None    Food insecurity:     Worry: None     Inability: None    Transportation needs:     Medical: None     Non-medical: None   Tobacco Use    Smoking status: Former Smoker    Smokeless tobacco: Never Used   Substance and Sexual Activity    Alcohol use: No    Drug use: No    Sexual activity: None   Lifestyle    Physical activity:     Days per week: None     Minutes per session: None    Stress: None   Relationships    Social connections:     Talks on phone: None     Gets together: None     Attends Hoahaoism service: None     Active member of club or organization: None     Attends meetings of clubs or organizations: None Relationship status: None    Intimate partner violence:     Fear of current or ex partner: None     Emotionally abused: None     Physically abused: None     Forced sexual activity: None   Other Topics Concern    None   Social History Narrative    None      Medications and Allergies:     Current Outpatient Medications   Medication Sig Dispense Refill    ALPRAZolam (XANAX) 0 25 mg tablet At twice a day as needed for anxiety 30 tablet 0    aspirin 81 MG tablet Take 81 mg by mouth daily   Azelastine HCl 0 15 % SOLN Inhale 1 spray 2 (two) times a day  0    bismuth subsalicylate (PEPTO BISMOL) 262 MG chewable tablet Chew 524 mg daily as needed for indigestion   Cholecalciferol 76286 units capsule Take 50,000 Units by mouth every 30 (thirty) days      clopidogrel (PLAVIX) 75 mg tablet Take 75 mg by mouth daily      fluocinonide (LIDEX) 0 05 % cream Apply topically 2 (two) times a day   furosemide (LASIX) 20 mg tablet Take 1 tablet as needed for edema 90 tablet 1    gabapentin (NEURONTIN) 300 mg capsule TAKE 2 CAPSULES BY MOUTH EVERY DAY AT BEDTIME 180 capsule 1    Insulin Pen Needle 31G X 8 MM MISC Inject 3 times a day 100 each 2    isosorbide mononitrate (IMDUR) 30 mg 24 hr tablet Take 30 mg by mouth daily Take 3 tablets daily      LANTUS SOLOSTAR 100 units/mL injection pen 30 units qhs (Patient taking differently: 26-28 units qhs) 15 pen 3    loperamide (IMODIUM) 2 mg capsule Take by mouth      metoprolol succinate (TOPROL-XL) 100 mg 24 hr tablet Take 100 mg by mouth daily   multivitamin (THERAGRAN) TABS Take 1 tablet by mouth daily        NOVOLOG FLEXPEN 100 units/mL injection pen INJECT UP TO 50 UNITS DAILY SUB CUT (Patient taking differently: Inject 15 Units under the skin 3 (three) times a day with meals ) 15 pen 1    omeprazole (PriLOSEC) 20 mg delayed release capsule Take 1 capsule (20 mg total) by mouth daily 90 capsule 1    loratadine (CLARITIN) 10 mg tablet Take 1 tablet (10 mg total) by mouth daily for 15 days 30 tablet 1     No current facility-administered medications for this visit  Allergies   Allergen Reactions    Atorvastatin Hives, Other (See Comments), Itching and Rash     Pt  Reports "Hives"  Lipitor and Simvastatin  Other reaction(s): Other (See Comments)  Pt  Reports "Hives"  Lipitor and Simvastatin    Simvastatin Rash     Other reaction(s): Edema, Other Reaction  "ALL STATINS"    Insulin Lispro Edema and Swelling     Other reaction(s): Edema  Other reaction(s): Edema      Medical Tape      Other reaction(s): Other (See Comments)  rash to electrodes    Statins Itching    Other Other (See Comments), Itching and Rash     rash to electrodes  rash to electrodes and adhesives      Immunizations:     Immunization History   Administered Date(s) Administered    DT (pediatric) 01/24/2014    DTaP 5 07/01/2007    INFLUENZA 10/12/2007, 10/06/2008, 10/09/2012, 01/26/2015, 01/23/2017, 09/28/2017, 10/15/2018, 10/29/2019    Influenza Quadrivalent, 6-35 Months IM 11/14/2014    Influenza Split High Dose Preservative Free IM 01/23/2017, 09/28/2017    Influenza TIV (IM) 10/06/2011, 11/13/2013, 09/15/2015    Influenza, high dose seasonal 0 5 mL 11/01/2018    Pneumococcal Conjugate 13-Valent 08/04/2015, 10/01/2017    Pneumococcal Polysaccharide PPV23 03/01/2004, 11/12/2008, 07/10/2013    Tuberculin Skin Test-PPD Intradermal 05/08/2019      Health Maintenance:         Topic Date Due    CRC Screening: Colonoscopy  05/11/2026     There are no preventive care reminders to display for this patient  Medicare Health Risk Assessment:     /84 (BP Location: Left arm, Patient Position: Sitting, Cuff Size: Standard)   Pulse 80   Temp 97 9 °F (36 6 °C)   Resp 13   Ht 6' 4" (1 93 m)   Wt 110 kg (242 lb)   BMI 29 46 kg/m²      Rhea Thakkar is here for his Initial Wellness visit  Health Risk Assessment:   Patient rates overall health as poor   Patient feels that their physical health rating is slightly worse  Eyesight was rated as slightly worse  Hearing was rated as same  Patient feels that their emotional and mental health rating is slightly worse  Pain experienced in the last 7 days has been a lot  Patient's pain rating has been 8/10  He is going to see the back specialist concerning his back pain pain managed    Depression Screening:   PHQ-2 Score: 3  PHQ-9 Score: 12      Fall Risk Screening: In the past year, patient has experienced: history of falling in past year    Number of falls: 2 or more  Injured during fall?: Yes    Feels unsteady when standing or walking?: Yes    Worried about falling?: Yes      Home Safety:  Patient has trouble with stairs inside or outside of their home  Patient has working smoke alarms and has working carbon monoxide detector  Home safety hazards include: loose rugs on the floor and medications that cause fatigue  Nutrition:   Current diet is Frequent junk food and Diabetic  A1c went up so he has to limit the junk food should be on a diabeti    Medications:   Patient is currently taking over-the-counter supplements  OTC medications include: see medication list  Patient is able to manage medications  Activities of Daily Living (ADLs)/Instrumental Activities of Daily Living (IADLs):   Walk and transfer into and out of bed and chair?: Yes  Dress and groom yourself?: Yes    Bathe or shower yourself?: Yes    Feed yourself?  Yes  Do your laundry/housekeeping?: Yes  Manage your money, pay your bills and track your expenses?: Yes  Make your own meals?: Yes    Do your own shopping?: Yes    Previous Hospitalizations:   Any hospitalizations or ED visits within the last 12 months?: Yes    How many hospitalizations have you had in the last year?: 1-2    Advance Care Planning:   Living will: No    Durable POA for healthcare: No    Advanced directive: No    Advanced directive counseling given: Yes    End of Life Decisions reviewed with patient: Yes Provider agrees with end of life decisions: Yes      Comments: Will provide the information at the material    Cognitive Screening:   Provider or family/friend/caregiver concerned regarding cognition?: No    PREVENTIVE SCREENINGS      Cardiovascular Screening:    General: Screening Not Indicated and History Lipid Disorder      Diabetes Screening:     General: Screening Not Indicated and History Diabetes      Colorectal Cancer Screening:     General: Screening Current      Prostate Cancer Screening:    General: Screening Not Indicated      Osteoporosis Screening:      Due for: DXA Axial      Abdominal Aortic Aneurysm (AAA) Screening:    Risk factors include: tobacco use        Lung Cancer Screening:     General: Screening Not Indicated      Hepatitis C Screening:    General: Screening Current    Other Counseling Topics:   Car/seat belt/driving safety and regular weightbearing exercise and calcium and vitamin D intake         Sara Lowry MD

## 2020-02-12 NOTE — PATIENT INSTRUCTIONS
Medicare Preventive Visit Patient Instructions  Thank you for completing your Welcome to Medicare Visit or Medicare Annual Wellness Visit today  Your next wellness visit will be due in one year (2/12/2021)  The screening/preventive services that you may require over the next 5-10 years are detailed below  Some tests may not apply to you based off risk factors and/or age  Screening tests ordered at today's visit but not completed yet may show as past due  Also, please note that scanned in results may not display below  Preventive Screenings:  Service Recommendations Previous Testing/Comments   Colorectal Cancer Screening  · Colonoscopy    · Fecal Occult Blood Test (FOBT)/Fecal Immunochemical Test (FIT)  · Fecal DNA/Cologuard Test  · Flexible Sigmoidoscopy Age: 54-65 years old   Colonoscopy: every 10 years (May be performed more frequently if at higher risk)  OR  FOBT/FIT: every 1 year  OR  Cologuard: every 3 years  OR  Sigmoidoscopy: every 5 years  Screening may be recommended earlier than age 48 if at higher risk for colorectal cancer  Also, an individualized decision between you and your healthcare provider will decide whether screening between the ages of 74-80 would be appropriate   Colonoscopy: 05/11/2016  FOBT/FIT: Not on file  Cologuard: Not on file  Sigmoidoscopy: Not on file    Screening Current     Prostate Cancer Screening Individualized decision between patient and health care provider in men between ages of 53-78   Medicare will cover every 12 months beginning on the day after your 50th birthday PSA: No results in last 5 years     Screening Not Indicated     Hepatitis C Screening Once for adults born between [birth year] and 1967  More frequently in patients at high risk for Hepatitis C Hep C Antibody: Not on file       Diabetes Screening 1-2 times per year if you're at risk for diabetes or have pre-diabetes Fasting glucose: 133 mg/dL   A1C: 8 9 %    Screening Not Indicated  History Diabetes   Cholesterol Screening Once every 5 years if you don't have a lipid disorder  May order more often based on risk factors  Lipid panel: 01/24/2020    Screening Not Indicated  History Lipid Disorder      Other Preventive Screenings Covered by Medicare:  1  Abdominal Aortic Aneurysm (AAA) Screening: covered once if your at risk  You're considered to be at risk if you have a family history of AAA or a male between the age of 73-68 who smoking at least 100 cigarettes in your lifetime  2  Lung Cancer Screening: covers low dose CT scan once per year if you meet all of the following conditions: (1) Age 50-69; (2) No signs or symptoms of lung cancer; (3) Current smoker or have quit smoking within the last 15 years; (4) You have a tobacco smoking history of at least 30 pack years (packs per day x number of years you smoked); (5) You get a written order from a healthcare provider  3  Glaucoma Screening: covered annually if you're considered high risk: (1) You have diabetes OR (2) Family history of glaucoma OR (3)  aged 48 and older OR (3)  American aged 72 and older  3  Osteoporosis Screening: covered every 2 years if you meet one of the following conditions: (1) Have a vertebral abnormality; (2) On glucocorticoid therapy for more than 3 months; (3) Have primary hyperparathyroidism; (4) On osteoporosis medications and need to assess response to drug therapy  5  HIV Screening: covered annually if you're between the age of 12-76  Also covered annually if you are younger than 13 and older than 72 with risk factors for HIV infection  For pregnant patients, it is covered up to 3 times per pregnancy      Immunizations:  Immunization Recommendations   Influenza Vaccine Annual influenza vaccination during flu season is recommended for all persons aged >= 6 months who do not have contraindications   Pneumococcal Vaccine (Prevnar and Pneumovax)  * Prevnar = PCV13  * Pneumovax = PPSV23 Adults 25-60 years old: 1-3 doses may be recommended based on certain risk factors  Adults 72 years old: Prevnar (PCV13) vaccine recommended followed by Pneumovax (PPSV23) vaccine  If already received PPSV23 since turning 65, then PCV13 recommended at least one year after PPSV23 dose  Hepatitis B Vaccine 3 dose series if at intermediate or high risk (ex: diabetes, end stage renal disease, liver disease)   Tetanus (Td) Vaccine - COST NOT COVERED BY MEDICARE PART B Following completion of primary series, a booster dose should be given every 10 years to maintain immunity against tetanus  Td may also be given as tetanus wound prophylaxis  Tdap Vaccine - COST NOT COVERED BY MEDICARE PART B Recommended at least once for all adults  For pregnant patients, recommended with each pregnancy  Shingles Vaccine (Shingrix) - COST NOT COVERED BY MEDICARE PART B  2 shot series recommended in those aged 48 and above     Health Maintenance Due:      Topic Date Due    CRC Screening: Colonoscopy  05/11/2026     Immunizations Due:  There are no preventive care reminders to display for this patient  Advance Directives   What are advance directives? Advance directives are legal documents that state your wishes and plans for medical care  These plans are made ahead of time in case you lose your ability to make decisions for yourself  Advance directives can apply to any medical decision, such as the treatments you want, and if you want to donate organs  What are the types of advance directives? There are many types of advance directives, and each state has rules about how to use them  You may choose a combination of any of the following:  · Living will: This is a written record of the treatment you want  You can also choose which treatments you do not want, which to limit, and which to stop at a certain time  This includes surgery, medicine, IV fluid, and tube feedings  · Durable power of  for healthcare [facility]):   This is a written record that states who you want to make healthcare choices for you when you are unable to make them for yourself  This person, called a proxy, is usually a family member or a friend  You may choose more than 1 proxy  · Do not resuscitate (DNR) order:  A DNR order is used in case your heart stops beating or you stop breathing  It is a request not to have certain forms of treatment, such as CPR  A DNR order may be included in other types of advance directives  · Medical directive: This covers the care that you want if you are in a coma, near death, or unable to make decisions for yourself  You can list the treatments you want for each condition  Treatment may include pain medicine, surgery, blood transfusions, dialysis, IV or tube feedings, and a ventilator (breathing machine)  · Values history: This document has questions about your views, beliefs, and how you feel and think about life  This information can help others choose the care that you would choose  Why are advance directives important? An advance directive helps you control your care  Although spoken wishes may be used, it is better to have your wishes written down  Spoken wishes can be misunderstood, or not followed  Treatments may be given even if you do not want them  An advance directive may make it easier for your family to make difficult choices about your care  Depression   Depression  is a medical condition that causes feelings of sadness or hopelessness that do not go away  Depression may cause you to lose interest in things you used to enjoy  These feelings may interfere with your daily life  Call your local emergency number (911 in the [address] [place] [address],[address]) if:   · You think about harming yourself or someone else  · You have done something on purpose to hurt yourself    The following resources are available at any time to help you, if needed:   · [address] [facility]: 1-599.923.8211 (7-464-329-IYKM)   · Suicide Hotline: 7-890.162.4286 (6-380-TQAFOEI)   · For a list of international numbers: https://save org/find-help/international-resources/  Treatment for depression may include medicine to relieve depression  Medicine is often used together with therapy  Therapy is a way for you to talk about your feelings and anything that may be causing depression  Therapy can be done alone or in a group  It may also be done with family members or a significant other  · Get regular physical activity  · Create a regular sleep schedule  · Eat a variety of healthy foods  · Do not drink alcohol or use drugs  Fall Prevention    Fall prevention  includes ways to make your home and other areas safer  It also includes ways you can move more carefully to prevent a fall  Health conditions that cause changes in your blood pressure, vision, or muscle strength and coordination may increase your risk for falls  Medicines may also increase your risk for falls if they make you dizzy, weak, or sleepy  Fall prevention tips:   · Stand or sit up slowly  · Use assistive devices as directed  · Wear shoes that fit well and have soles that   · Wear a personal alarm  · Stay active  · Manage your medical conditions  Home Safety Tips:  · Add items to prevent falls in the bathroom  · Keep paths clear  · Install bright lights in your home  · Keep items you use often on shelves within reach  · Paint or place reflective tape on the edges of your stairs  Weight Management   Why it is important to manage your weight:  Being overweight increases your risk of health conditions such as heart disease, high blood pressure, type 2 diabetes, and certain types of cancer  It can also increase your risk for osteoarthritis, sleep apnea, and other respiratory problems  Aim for a slow, steady weight loss  Even a small amount of weight loss can lower your risk of health problems    How to lose weight safely:  A safe and healthy way to lose weight is to eat fewer calories and get regular exercise  You can lose up about 1 pound a week by decreasing the number of calories you eat by 500 calories each day  Healthy meal plan for weight management:  A healthy meal plan includes a variety of foods, contains fewer calories, and helps you stay healthy  A healthy meal plan includes the following:  · Eat whole-grain foods more often  A healthy meal plan should contain fiber  Fiber is the part of grains, fruits, and vegetables that is not broken down by your body  Whole-grain foods are healthy and provide extra fiber in your diet  Some examples of whole-grain foods are whole-wheat breads and pastas, oatmeal, brown rice, and bulgur  · Eat a variety of vegetables every day  Include dark, leafy greens such as spinach, kale, alexsander greens, and mustard greens  Eat yellow and orange vegetables such as carrots, sweet potatoes, and winter squash  · Eat a variety of fruits every day  Choose fresh or canned fruit (canned in its own juice or light syrup) instead of juice  Fruit juice has very little or no fiber  · Eat low-fat dairy foods  Drink fat-free (skim) milk or 1% milk  Eat fat-free yogurt and low-fat cottage cheese  Try low-fat cheeses such as mozzarella and other reduced-fat cheeses  · Choose meat and other protein foods that are low in fat  Choose beans or other legumes such as split peas or lentils  Choose fish, skinless poultry (chicken or turkey), or lean cuts of red meat (beef or pork)  Before you cook meat or poultry, cut off any visible fat  · Use less fat and oil  Try baking foods instead of frying them  Add less fat, such as margarine, sour cream, regular salad dressing and mayonnaise to foods  Eat fewer high-fat foods  Some examples of high-fat foods include french fries, doughnuts, ice cream, and cakes  · Eat fewer sweets  Limit foods and drinks that are high in sugar  This includes candy, cookies, regular soda, and sweetened drinks    Exercise:  Exercise at least 30 minutes per day on most days of the week  Some examples of exercise include walking, biking, dancing, and swimming  You can also fit in more physical activity by taking the stairs instead of the elevator or parking farther away from stores  Ask your healthcare provider about the best exercise plan for you  © Copyright "[employer] 2018 Information is for End User's use only and may not be sold, redistributed or otherwise used for commercial purposes  All illustrations and images included in CareNotes® are the copyrighted property of [facility]  or [address] [place], [place] [address] [facility] Zoloft 50 mg daily take it every day if you get anxious she can take the Xanax with it  Depression After Spinal Cord Injury   WHAT YOU NEED TO KNOW:   Depression is a mood disorder  A mood is an emotion or a feeling  Moods affect your behavior and how you feel about yourself and life in general  Depression is a sad mood that you cannot control  Depression may be short-term or long-term, and it can be treated  DISCHARGE INSTRUCTIONS:   Medicines:   · Antidepressants: These medicines are given to decrease or stop the symptoms of depression  They usually take several weeks to start working  You may need to take antidepressants for up to 1 year  If you have had more than 2 past episodes of major depression, you may need to use antidepressants longer  There are several different kinds of antidepressants available  It may take some time to find the one that works best for you  · Take your medicine as directed  Contact your healthcare provider if you think your medicine is not helping or if you have side effects  Tell him or her if you are allergic to any medicine  Keep a list of the medicines, vitamins, and herbs you take  Include the amounts, and when and why you take them  Bring the list or the pill bottles to follow-up visits  Carry your medicine list with you in case of an emergency    Medicine monitoring:  Each time you meet with your healthcare providers, they will ask you about how you are feeling  Healthcare providers will watch how you respond to your medicines  Tell healthcare providers about side effects or problems you may be having with your medicine  Sometimes the kind and amount of medicine may have to be changed  Follow up with your healthcare provider or psychiatrist as directed:  Write down your questions so you remember to ask them during your visits  Psychotherapy:  During therapy, you will talk with healthcare providers about how to cope with your feelings and moods  This can be done alone or in a group  It may also be done with family members or a significant other  For support and more information:   · National Spinal Cord Injury [address] [facility], [facility]  , [address]  Phone: 7- 578 - 641-9453  Web Address: www spinalcord  org  · [address] [facility], Public Information & Communication Branch  [address], [address], [address]  [place] [address]   Phone: 3- 357 - [phone]  Phone: 4- [phone] - [phone]  Web Address: [facility] tn  Contact your healthcare provider or psychiatrist if:   · You cannot eat or you are eating more than usual     · You are not able to sleep well or you are sleeping more than usual     · You feel anxious, restless, angry, or you have a panic attack after starting antidepressant medicine  · You feel that you are becoming depressed again after starting antidepressant medicine  · You cannot make it to your next visit  · You have questions or concerns about your condition or care  Return to the emergency department if:   · You are unable to cope with normal daily activities  · You think about killing yourself or someone else  © 2017 [address] [facility] Information is for End User's use only and may not be sold, redistributed or otherwise used for commercial purposes   All illustrations and images included in CareNotes® are the copyrighted property of [employer]  or Salvador Cooper  The above information is an  only  It is not intended as medical advice for individual conditions or treatments  Talk to your doctor, nurse or pharmacist before following any medical regimen to see if it is safe and effective for you

## 2020-03-03 DIAGNOSIS — I25.10 CORONARY ARTERY DISEASE INVOLVING NATIVE CORONARY ARTERY OF NATIVE HEART WITHOUT ANGINA PECTORIS: Primary | ICD-10-CM

## 2020-03-03 RX ORDER — ISOSORBIDE MONONITRATE 30 MG/1
30 TABLET, EXTENDED RELEASE ORAL DAILY
Qty: 270 TABLET | Refills: 0 | Status: SHIPPED | OUTPATIENT
Start: 2020-03-03 | End: 2020-06-03

## 2020-03-11 ENCOUNTER — OFFICE VISIT (OUTPATIENT)
Dept: INTERNAL MEDICINE CLINIC | Facility: CLINIC | Age: 75
End: 2020-03-11
Payer: MEDICARE

## 2020-03-11 VITALS
DIASTOLIC BLOOD PRESSURE: 84 MMHG | BODY MASS INDEX: 29.59 KG/M2 | TEMPERATURE: 97.7 F | WEIGHT: 243 LBS | HEART RATE: 80 BPM | HEIGHT: 76 IN | RESPIRATION RATE: 12 BRPM | SYSTOLIC BLOOD PRESSURE: 148 MMHG

## 2020-03-11 DIAGNOSIS — Z79.4 TYPE 2 DIABETES MELLITUS WITH DIABETIC PERIPHERAL ANGIOPATHY WITHOUT GANGRENE, WITH LONG-TERM CURRENT USE OF INSULIN (HCC): Primary | ICD-10-CM

## 2020-03-11 DIAGNOSIS — R19.7 DIARRHEA, UNSPECIFIED TYPE: ICD-10-CM

## 2020-03-11 DIAGNOSIS — J42 CHRONIC BRONCHITIS, UNSPECIFIED CHRONIC BRONCHITIS TYPE (HCC): ICD-10-CM

## 2020-03-11 DIAGNOSIS — I70.209 ARTERIOSCLEROSIS OF ARTERY OF EXTREMITY (HCC): ICD-10-CM

## 2020-03-11 DIAGNOSIS — E55.9 VITAMIN D DEFICIENCY: ICD-10-CM

## 2020-03-11 DIAGNOSIS — I10 ESSENTIAL HYPERTENSION: ICD-10-CM

## 2020-03-11 DIAGNOSIS — E11.51 TYPE 2 DIABETES MELLITUS WITH DIABETIC PERIPHERAL ANGIOPATHY WITHOUT GANGRENE, WITH LONG-TERM CURRENT USE OF INSULIN (HCC): Primary | ICD-10-CM

## 2020-03-11 DIAGNOSIS — N18.4 CHRONIC KIDNEY DISEASE, STAGE 4 (SEVERE) (HCC): ICD-10-CM

## 2020-03-11 DIAGNOSIS — E78.5 HYPERLIPIDEMIA, UNSPECIFIED HYPERLIPIDEMIA TYPE: ICD-10-CM

## 2020-03-11 DIAGNOSIS — I25.10 CORONARY ARTERY DISEASE INVOLVING NATIVE CORONARY ARTERY OF NATIVE HEART WITHOUT ANGINA PECTORIS: ICD-10-CM

## 2020-03-11 PROCEDURE — 3008F BODY MASS INDEX DOCD: CPT | Performed by: INTERNAL MEDICINE

## 2020-03-11 PROCEDURE — 3066F NEPHROPATHY DOC TX: CPT | Performed by: INTERNAL MEDICINE

## 2020-03-11 PROCEDURE — 3079F DIAST BP 80-89 MM HG: CPT | Performed by: INTERNAL MEDICINE

## 2020-03-11 PROCEDURE — 1170F FXNL STATUS ASSESSED: CPT | Performed by: INTERNAL MEDICINE

## 2020-03-11 PROCEDURE — 99214 OFFICE O/P EST MOD 30 MIN: CPT | Performed by: INTERNAL MEDICINE

## 2020-03-11 PROCEDURE — 3052F HG A1C>EQUAL 8.0%<EQUAL 9.0%: CPT | Performed by: INTERNAL MEDICINE

## 2020-03-11 PROCEDURE — 4040F PNEUMOC VAC/ADMIN/RCVD: CPT | Performed by: INTERNAL MEDICINE

## 2020-03-11 PROCEDURE — 3077F SYST BP >= 140 MM HG: CPT | Performed by: INTERNAL MEDICINE

## 2020-03-11 PROCEDURE — 1036F TOBACCO NON-USER: CPT | Performed by: INTERNAL MEDICINE

## 2020-03-11 PROCEDURE — 3060F POS MICROALBUMINURIA REV: CPT | Performed by: INTERNAL MEDICINE

## 2020-03-11 PROCEDURE — 1160F RVW MEDS BY RX/DR IN RCRD: CPT | Performed by: INTERNAL MEDICINE

## 2020-03-11 NOTE — PROGRESS NOTES
Assessment/Plan:  Diarrhea take Pepto-Bismol or Imodium modify her diet clear liquids followed by full liquid adjust her insulin accordingly symptoms persists get stool cultures and lab work before the weekend on Friday back in 4 weeks or sooner if needed    Diabetic eye exam gets it at the [place]    Problem 1  Gastroenteritis occur yesterday bowel movements about 4 day liquidy had no sick contact no recent antibiotics has some nausea no vomiting looks nontoxic blood pressure 130/80 standing 120/80 no orthostasis I told him to modify his diet clear liquids to full liquids take Pepto-Bismol Imodium as a p r n  Basis if the diarrhea persists into Friday  Get her lab work done as well as her stool cultures  Problem 2  Diabetes follows up with endocrinology no episodes of hypoglycemia  No change in meds     Problem 3  Coronary artery disease stable no chest palpitation shortness of breath  Tolerating the medication without any problems    Problem 4  Chronic back pain so the neurosurgeon recommended surgery at the present time he is holding off he has follow-up with them and pain management     Problem 5  History of bladder cancer no evidence of dysuria or hematuria  No problem-specific Assessment & Plan notes found for this encounter  Diagnoses and all orders for this visit:    Type 2 diabetes mellitus with diabetic peripheral angiopathy without gangrene, with long-term current use of insulin ([facility])  -     CBC and differential; Future  -     Comprehensive metabolic panel; Future  -     TSH, 3rd generation with Free T4 reflex; Future  -     Magnesium; Future  -     Stool Enteric Bacterial Panel by PCR; Future  -     Clostridium difficile toxin by PCR;  Future  -     UA (URINE) with reflex to Scope    Chronic bronchitis, unspecified chronic bronchitis type ([facility] [address] )    Coronary artery disease involving native coronary artery of native heart without angina pectoris  -     CBC and differential; Future  - Comprehensive metabolic panel; Future  -     TSH, 3rd generation with Free T4 reflex; Future  -     Magnesium; Future  -     Stool Enteric Bacterial Panel by PCR; Future  -     Clostridium difficile toxin by PCR; Future  -     UA (URINE) with reflex to Scope    Essential hypertension  -     CBC and differential; Future  -     Comprehensive metabolic panel; Future  -     TSH, 3rd generation with Free T4 reflex; Future  -     Magnesium; Future  -     Stool Enteric Bacterial Panel by PCR; Future  -     Clostridium difficile toxin by PCR; Future  -     UA (URINE) with reflex to Scope    Arteriosclerosis of artery of extremity (HCC)  -     CBC and differential; Future  -     Comprehensive metabolic panel; Future  -     TSH, 3rd generation with Free T4 reflex; Future  -     Magnesium; Future  -     Stool Enteric Bacterial Panel by PCR; Future  -     Clostridium difficile toxin by PCR; Future  -     UA (URINE) with reflex to Scope    Chronic kidney disease, stage 4 (severe) (HCC)  -     CBC and differential; Future  -     Comprehensive metabolic panel; Future  -     TSH, 3rd generation with Free T4 reflex; Future  -     Magnesium; Future  -     Stool Enteric Bacterial Panel by PCR; Future  -     Clostridium difficile toxin by PCR; Future  -     UA (URINE) with reflex to Scope    Hyperlipidemia, unspecified hyperlipidemia type    Diarrhea, unspecified type  -     CBC and differential; Future  -     Comprehensive metabolic panel; Future  -     TSH, 3rd generation with Free T4 reflex; Future  -     Magnesium; Future  -     Stool Enteric Bacterial Panel by PCR; Future  -     Clostridium difficile toxin by PCR; Future  -     UA (URINE) with reflex to Scope    Vitamin D deficiency  -     Cholecalciferol 1 25 MG (61672 UT) capsule; Take 1 capsule (50,000 Units total) by mouth every 30 (thirty) days  -     Vitamin D 25 hydroxy; Future          Subjective:      Patient ID: Saúl Elliott is a 66 y o  male      Chief Complaint Patient presents with    Follow-up     Sees eye doctor @ [address] [facility]          Current Outpatient Medications:     ALPRAZolam (XANAX) 0 25 mg tablet, At twice a day as needed for anxiety, Disp: 30 tablet, Rfl: 0    aspirin 81 MG tablet, Take 81 mg by mouth daily  , Disp: , Rfl:     Azelastine HCl 0 15 % SOLN, Inhale 1 spray 2 (two) times a day, Disp: , Rfl: 0    bismuth subsalicylate (PEPTO BISMOL) 262 MG chewable tablet, Chew 524 mg daily as needed for indigestion  , Disp: , Rfl:     Cholecalciferol 1 25 MG (19318 UT) capsule, Take 1 capsule (50,000 Units total) by mouth every 30 (thirty) days, Disp: 12 capsule, Rfl: 0    clopidogrel (PLAVIX) 75 mg tablet, Take 75 mg by mouth daily, Disp: , Rfl:     fluocinonide (LIDEX) 0 05 % cream, Apply topically 2 (two) times a day , Disp: , Rfl:     furosemide (LASIX) 20 mg tablet, Take 1 tablet as needed for edema, Disp: 90 tablet, Rfl: 1    gabapentin (NEURONTIN) 300 mg capsule, TAKE 2 CAPSULES BY MOUTH EVERY DAY AT BEDTIME, Disp: 180 capsule, Rfl: 1    Insulin Pen Needle 31G X 8 MM MISC, Inject 3 times a day, Disp: 100 each, Rfl: 2    isosorbide mononitrate (IMDUR) 30 mg 24 hr tablet, Take 1 tablet (30 mg total) by mouth daily Take 3 tablets daily, Disp: 270 tablet, Rfl: 0    LANTUS SOLOSTAR 100 units/mL injection pen, 30 units qhs (Patient taking differently: 26-28 units qhs), Disp: 15 pen, Rfl: 3    loperamide (IMODIUM) 2 mg capsule, Take by mouth, Disp: , Rfl:     metoprolol succinate (TOPROL-XL) 100 mg 24 hr tablet, Take 100 mg by mouth daily  , Disp: , Rfl:     multivitamin (THERAGRAN) TABS, Take 1 tablet by mouth daily  , Disp: , Rfl:     NOVOLOG FLEXPEN 100 units/mL injection pen, INJECT UP TO 50 UNITS DAILY SUB CUT (Patient taking differently: Inject 15 Units under the skin 3 (three) times a day with meals ), Disp: 15 pen, Rfl: 1    omeprazole (PriLOSEC) 20 mg delayed release capsule, Take 1 capsule (20 mg total) by mouth daily, Disp: 90 capsule, Rfl: 1    sertraline (ZOLOFT) 50 mg tablet, Take 1 tablet (50 mg total) by mouth daily, Disp: 30 tablet, Rfl: 3    loratadine (CLARITIN) 10 mg tablet, Take 1 tablet (10 mg total) by mouth daily for 15 days, Disp: 30 tablet, Rfl: 1    HPI    The following portions of the patient's history were reviewed and updated as appropriate: allergies, current medications, past family history, past medical history, past social history, past surgical history and problem list     Review of Systems   Constitutional: Negative  Negative for activity change, appetite change, fatigue, fever and unexpected weight change  HENT: Negative for congestion, ear pain, hearing loss, mouth sores, postnasal drip, rhinorrhea, sore throat, trouble swallowing and voice change  Eyes: Negative for pain, redness and visual disturbance  Respiratory: Negative for cough, chest tightness, shortness of breath and wheezing  Cardiovascular: Negative for chest pain, palpitations and leg swelling  Gastrointestinal: Positive for diarrhea  Negative for abdominal distention, abdominal pain, blood in stool, constipation and nausea  Endocrine: Negative for cold intolerance, heat intolerance, polydipsia, polyphagia and polyuria  Genitourinary: Negative for difficulty urinating, dysuria, flank pain, frequency, hematuria and urgency  Musculoskeletal: Negative for arthralgias, back pain, gait problem, joint swelling and myalgias  Skin: Negative for color change and pallor  Neurological: Negative for dizziness, tremors, seizures, syncope, weakness, numbness and headaches  Hematological: Negative for adenopathy  Does not bruise/bleed easily  Psychiatric/Behavioral: Negative  Negative for sleep disturbance  The patient is not nervous/anxious            Objective:    Results for orders placed or performed in visit on 02/10/20   Urine culture   Result Value Ref Range    Urine Culture >100,000 cfu/ml         /84 (BP Location: Left arm, Patient Position: Sitting, Cuff Size: Standard)   Pulse 80   Temp 97 7 °F (36 5 °C)   Resp 12   Ht 6' 4" (1 93 m)   Wt 110 kg (243 lb)   BMI 29 58 kg/m²      Physical Exam   Constitutional: He is oriented to person, place, and time  He appears well-developed and well-nourished  HENT:   Head: Normocephalic  Right Ear: External ear normal    Left Ear: External ear normal    Nose: Nose normal    Mouth/Throat: Oropharynx is clear and moist  No oropharyngeal exudate  Eyes: Pupils are equal, round, and reactive to light  Conjunctivae and EOM are normal    Neck: Normal range of motion  Neck supple  No thyromegaly present  Cardiovascular: Normal rate, regular rhythm, normal heart sounds and intact distal pulses  Exam reveals no gallop and no friction rub  No murmur heard  S1-S2 regular rhythm  Extremities no edema  Blood pressure 130/80 left arm sitting 120/80 left arm standing   Pulmonary/Chest: Effort normal and breath sounds normal  No respiratory distress  He has no wheezes  He has no rales  Lungs are clear no wheezing rales or rhonchi   Abdominal: Soft  Bowel sounds are normal  He exhibits no distension and no mass  There is no tenderness  There is no rebound and no guarding  Abdomen soft mild tenderness no rebound tenderness active bowel sounds   Musculoskeletal: Normal range of motion  Lymphadenopathy:     He has no cervical adenopathy  Neurological: He is alert and oriented to person, place, and time  Skin: Skin is warm and dry  Psychiatric: He has a normal mood and affect  His behavior is normal  Judgment normal    Nursing note and vitals reviewed

## 2020-03-11 NOTE — PATIENT INSTRUCTIONS
You may take Pepto-Bismol or Imodium for the diarrhea the diarrhea persists get her lab work done a stool cultures  Modify your diet by clear full liquids  Until the diarrhea  Nutrition Tips for Relief of Diarrhea   WHAT YOU NEED TO KNOW:   There are diet changes you can make to help relieve or stop diarrhea  These changes include limiting or avoiding foods and liquids that are high in sugar, fat, fiber, and lactose  Lactose is a sugar found in milk products  Milk products can cause diarrhea in people who are lactose intolerant  You should also drink extra liquids to replace fluids that are lost when you have diarrhea  Diarrhea can lead to dehydration  DISCHARGE INSTRUCTIONS:   Foods to limit or avoid:   · Dairy:      ¨ Whole milk    ¨ Half-and-half, cream, and sour cream    ¨ Regular (whole milk) ice cream    · Grains:      ¨ Whole wheat and whole grain breads, pasta, cereals, and crackers    ¨ Brown and wild rice    ¨ Breads and cereals with seeds or nuts    ¨ Popcorn    · Fruit and vegetables:      ¨ All raw fruits, except bananas and melon    ¨ Dried fruits, including prunes and raisins    ¨ Canned fruit in heavy syrup    ¨ Prune juice and any fruit juice with pulp    ¨ Raw vegetables, except lettuce     ¨ Fried vegetables    ¨ Corn, raw and cooked broccoli, cabbage, cauliflower, and alexsander greens    · Protein:      ¨ Fried meat, poultry, and fish    ¨ High-fat luncheon meats, such as bologna    ¨ Fatty meats, such as sausage, reich, and hot dogs    ¨ Beans and nuts    · Liquids:      ¨ Sodas and fruit-flavored drinks    ¨ Drinks that contain caffeine, such as energy drinks, coffee, and tea     ¨ Drinks that contain alcohol or sugar alcohol, such as sorbitol  Foods and liquids you may eat or drink:  Most people can tolerate the foods and liquids listed below  If any of them make your symptoms worse, stop eating or drinking them until you feel better   If you are lactose intolerant, avoid milk products  · Dairy:      ¨ Skim or low-fat milk or evaporated milk    ¨ Soy milk or buttermilk     ¨ Low-fat, part-skim, and aged cheese    ¨ Yogurt, low-fat ice cream, or sherbert    · Grains:  (Choose foods with less than 2 grams of dietary fiber per serving )     ¨ White or refined flour breads, bagels, pasta, and crackers    ¨ Cold or hot cereals made from white or refined flour such as puffed rice, cornflakes, or cream of wheat    ¨ White rice    · Fruit and vegetables:      ¨ Bananas or melon    ¨ Fruit juice without pulp, except prune juice    ¨ Canned fruit in juice or light syrup    ¨ Lettuce and most well-cooked vegetables without seeds or skins     ¨ Strained vegetable juice    · Protein:      ¨ Tender, well-cooked meat, poultry, or fish    ¨ Well-cooked eggs or soy foods (cooked without added fat)    ¨ Smooth nut butters    · Fats:  (Limit fats to less than 8 teaspoons a day)     ¨ Oil, butter, or margarine, or mayonnaise    ¨ Cream cheese or salad dressings    · Liquids:      ¨ For infants, breast milk or formula    ¨ Oral rehydration solution     ¨ Decaffeinated coffee or caffeine-free teas    ¨ Soft drinks without caffeine  Other guidelines to follow:   · Drink liquids as directed  You may need to drink more liquids than usual to prevent dehydration  Ask how much liquid to drink each day and which liquids are best for you  You may need to drink an oral rehydration solution (ORS)  An ORS helps replace fluids and electrolytes that you lose when you have diarrhea  · Eat small meals or snacks every 3 to 4 hours  instead of large meals  Continue eating even if you still have diarrhea  Your diarrhea will continue for a few days but should gradually go away  © 2017 [address] [employer] Information is for End User's use only and may not be sold, redistributed or otherwise used for commercial purposes   All illustrations and images included in CareNotes® are the copyrighted property of [employer]  or Salvador Cooper  The above information is an  only  It is not intended as medical advice for individual conditions or treatments  Talk to your doctor, nurse or pharmacist before following any medical regimen to see if it is safe and effective for you      Is symptoms worsen go to the emergency room immediately call us with a progress report before the weekend on Friday

## 2020-03-16 ENCOUNTER — TELEPHONE (OUTPATIENT)
Dept: ENDOCRINOLOGY | Facility: HOSPITAL | Age: 75
End: 2020-03-16

## 2020-03-16 NOTE — TELEPHONE ENCOUNTER
When he starts the steroid, I would increase his NovoLog to 18 units with each meal (I have that he is taking 15 units with each meal currently)  I would ask him to send in blood sugars or call in blood sugars before the end of the week so we can make additional changes

## 2020-03-16 NOTE — TELEPHONE ENCOUNTER
Pt has lower back pain from having surgery and is being prescribed a Steroid  He is concerned about his blood sugars going up  Do you want him to do anything different with his insulin when his numbers are high?

## 2020-03-24 ENCOUNTER — TRANSCRIBE ORDERS (OUTPATIENT)
Dept: LAB | Facility: CLINIC | Age: 75
End: 2020-03-24

## 2020-03-24 ENCOUNTER — APPOINTMENT (OUTPATIENT)
Dept: LAB | Facility: CLINIC | Age: 75
End: 2020-03-24
Payer: MEDICARE

## 2020-03-24 DIAGNOSIS — I70.209 ARTERIOSCLEROSIS OF ARTERY OF EXTREMITY (HCC): ICD-10-CM

## 2020-03-24 DIAGNOSIS — R19.7 DIARRHEA, UNSPECIFIED TYPE: ICD-10-CM

## 2020-03-24 DIAGNOSIS — Z79.4 TYPE 2 DIABETES MELLITUS WITH DIABETIC PERIPHERAL ANGIOPATHY WITHOUT GANGRENE, WITH LONG-TERM CURRENT USE OF INSULIN (HCC): ICD-10-CM

## 2020-03-24 DIAGNOSIS — I10 ESSENTIAL HYPERTENSION: ICD-10-CM

## 2020-03-24 DIAGNOSIS — E11.51 TYPE 2 DIABETES MELLITUS WITH DIABETIC PERIPHERAL ANGIOPATHY WITHOUT GANGRENE, WITH LONG-TERM CURRENT USE OF INSULIN (HCC): ICD-10-CM

## 2020-03-24 DIAGNOSIS — E55.9 VITAMIN D DEFICIENCY: ICD-10-CM

## 2020-03-24 DIAGNOSIS — D09.0 CARCINOMA IN SITU OF BLADDER: Primary | ICD-10-CM

## 2020-03-24 DIAGNOSIS — I25.10 CORONARY ARTERY DISEASE INVOLVING NATIVE CORONARY ARTERY OF NATIVE HEART WITHOUT ANGINA PECTORIS: ICD-10-CM

## 2020-03-24 DIAGNOSIS — N18.4 CHRONIC KIDNEY DISEASE, STAGE 4 (SEVERE) (HCC): ICD-10-CM

## 2020-03-24 DIAGNOSIS — N30.00 ACUTE CYSTITIS WITHOUT HEMATURIA: ICD-10-CM

## 2020-03-24 LAB
25(OH)D3 SERPL-MCNC: 42.9 NG/ML (ref 30–100)
ALBUMIN SERPL BCP-MCNC: 3.8 G/DL (ref 3.5–5)
ALP SERPL-CCNC: 103 U/L (ref 46–116)
ALT SERPL W P-5'-P-CCNC: 62 U/L (ref 12–78)
ANION GAP SERPL CALCULATED.3IONS-SCNC: 3 MMOL/L (ref 4–13)
AST SERPL W P-5'-P-CCNC: 34 U/L (ref 5–45)
BACTERIA UR QL AUTO: ABNORMAL /HPF
BASOPHILS # BLD AUTO: 0.05 THOUSANDS/ΜL (ref 0–0.1)
BASOPHILS NFR BLD AUTO: 1 % (ref 0–1)
BILIRUB SERPL-MCNC: 0.4 MG/DL (ref 0.2–1)
BILIRUB UR QL STRIP: NEGATIVE
BUN SERPL-MCNC: 32 MG/DL (ref 5–25)
CALCIUM SERPL-MCNC: 9 MG/DL (ref 8.3–10.1)
CHLORIDE SERPL-SCNC: 106 MMOL/L (ref 100–108)
CLARITY UR: ABNORMAL
CO2 SERPL-SCNC: 29 MMOL/L (ref 21–32)
COLOR UR: YELLOW
CREAT SERPL-MCNC: 2.33 MG/DL (ref 0.6–1.3)
EOSINOPHIL # BLD AUTO: 0.16 THOUSAND/ΜL (ref 0–0.61)
EOSINOPHIL NFR BLD AUTO: 2 % (ref 0–6)
ERYTHROCYTE [DISTWIDTH] IN BLOOD BY AUTOMATED COUNT: 13.2 % (ref 11.6–15.1)
EST. AVERAGE GLUCOSE BLD GHB EST-MCNC: 197 MG/DL
GFR SERPL CREATININE-BSD FRML MDRD: 30 ML/MIN/1.73SQ M
GLUCOSE P FAST SERPL-MCNC: 150 MG/DL (ref 65–99)
GLUCOSE UR STRIP-MCNC: NEGATIVE MG/DL
HBA1C MFR BLD: 8.5 %
HCT VFR BLD AUTO: 37.6 % (ref 36.5–49.3)
HGB BLD-MCNC: 12.5 G/DL (ref 12–17)
HGB UR QL STRIP.AUTO: ABNORMAL
HYALINE CASTS #/AREA URNS LPF: ABNORMAL /LPF
IMM GRANULOCYTES # BLD AUTO: 0.04 THOUSAND/UL (ref 0–0.2)
IMM GRANULOCYTES NFR BLD AUTO: 1 % (ref 0–2)
KETONES UR STRIP-MCNC: NEGATIVE MG/DL
LEUKOCYTE ESTERASE UR QL STRIP: ABNORMAL
LYMPHOCYTES # BLD AUTO: 2.63 THOUSANDS/ΜL (ref 0.6–4.47)
LYMPHOCYTES NFR BLD AUTO: 32 % (ref 14–44)
MAGNESIUM SERPL-MCNC: 2.2 MG/DL (ref 1.6–2.6)
MCH RBC QN AUTO: 30.6 PG (ref 26.8–34.3)
MCHC RBC AUTO-ENTMCNC: 33.2 G/DL (ref 31.4–37.4)
MCV RBC AUTO: 92 FL (ref 82–98)
MONOCYTES # BLD AUTO: 0.66 THOUSAND/ΜL (ref 0.17–1.22)
MONOCYTES NFR BLD AUTO: 8 % (ref 4–12)
NEUTROPHILS # BLD AUTO: 4.78 THOUSANDS/ΜL (ref 1.85–7.62)
NEUTS SEG NFR BLD AUTO: 56 % (ref 43–75)
NITRITE UR QL STRIP: NEGATIVE
NON-SQ EPI CELLS URNS QL MICRO: ABNORMAL /HPF
NRBC BLD AUTO-RTO: 0 /100 WBCS
PH UR STRIP.AUTO: 6 [PH]
PLATELET # BLD AUTO: 199 THOUSANDS/UL (ref 149–390)
PMV BLD AUTO: 11.2 FL (ref 8.9–12.7)
POTASSIUM SERPL-SCNC: 4.4 MMOL/L (ref 3.5–5.3)
PROT SERPL-MCNC: 7.8 G/DL (ref 6.4–8.2)
PROT UR STRIP-MCNC: ABNORMAL MG/DL
RBC # BLD AUTO: 4.08 MILLION/UL (ref 3.88–5.62)
RBC #/AREA URNS AUTO: ABNORMAL /HPF
SODIUM SERPL-SCNC: 138 MMOL/L (ref 136–145)
SP GR UR STRIP.AUTO: 1.02 (ref 1–1.03)
TSH SERPL DL<=0.05 MIU/L-ACNC: 3.17 UIU/ML (ref 0.36–3.74)
UROBILINOGEN UR QL STRIP.AUTO: 0.2 E.U./DL
WBC # BLD AUTO: 8.32 THOUSAND/UL (ref 4.31–10.16)
WBC #/AREA URNS AUTO: ABNORMAL /HPF

## 2020-03-24 PROCEDURE — 83036 HEMOGLOBIN GLYCOSYLATED A1C: CPT

## 2020-03-24 PROCEDURE — 83735 ASSAY OF MAGNESIUM: CPT

## 2020-03-24 PROCEDURE — 84443 ASSAY THYROID STIM HORMONE: CPT

## 2020-03-24 PROCEDURE — 80053 COMPREHEN METABOLIC PANEL: CPT

## 2020-03-24 PROCEDURE — 82306 VITAMIN D 25 HYDROXY: CPT

## 2020-03-24 PROCEDURE — 81001 URINALYSIS AUTO W/SCOPE: CPT | Performed by: INTERNAL MEDICINE

## 2020-03-24 PROCEDURE — 36415 COLL VENOUS BLD VENIPUNCTURE: CPT

## 2020-03-24 PROCEDURE — 85025 COMPLETE CBC W/AUTO DIFF WBC: CPT

## 2020-03-24 RX ORDER — CIPROFLOXACIN 250 MG/1
250 TABLET, FILM COATED ORAL EVERY 12 HOURS SCHEDULED
Qty: 10 TABLET | Refills: 0 | Status: SHIPPED | OUTPATIENT
Start: 2020-03-24 | End: 2020-03-29

## 2020-03-24 NOTE — PROGRESS NOTES
Patient call he has burning and frequency for the past week known history of UTI background history of bladder cancer will going to call in Cipro 250 twice a day for 5 days renally adjusted his GFR is 28 he has no allergy to antibiotics checking the chart

## 2020-03-25 ENCOUNTER — TELEPHONE (OUTPATIENT)
Dept: INTERNAL MEDICINE CLINIC | Facility: CLINIC | Age: 75
End: 2020-03-25

## 2020-03-25 NOTE — TELEPHONE ENCOUNTER
Spoke to patient and instructed to finish the antibiotic that he started yesterday  Patient understood    ----- Message from Carmita Contreras MD sent at 3/24/2020  3:01 PM EDT -----  Please call the patient regarding his abnormal result   Urinalysis is consistent with a UTI I did send Cipro for him earlier that should hopefully take care of it

## 2020-04-03 ENCOUNTER — APPOINTMENT (OUTPATIENT)
Dept: LAB | Facility: CLINIC | Age: 75
End: 2020-04-03
Payer: MEDICARE

## 2020-04-03 DIAGNOSIS — N30.00 ACUTE CYSTITIS WITHOUT HEMATURIA: Primary | ICD-10-CM

## 2020-04-03 LAB
BACTERIA UR QL AUTO: ABNORMAL /HPF
BILIRUB UR QL STRIP: NEGATIVE
CLARITY UR: ABNORMAL
COLOR UR: YELLOW
GLUCOSE UR STRIP-MCNC: NEGATIVE MG/DL
HGB UR QL STRIP.AUTO: ABNORMAL
KETONES UR STRIP-MCNC: NEGATIVE MG/DL
LEUKOCYTE ESTERASE UR QL STRIP: ABNORMAL
NITRITE UR QL STRIP: NEGATIVE
NON-SQ EPI CELLS URNS QL MICRO: ABNORMAL /HPF
PH UR STRIP.AUTO: 6 [PH]
PROT UR STRIP-MCNC: ABNORMAL MG/DL
RBC #/AREA URNS AUTO: ABNORMAL /HPF
SP GR UR STRIP.AUTO: 1.01 (ref 1–1.03)
UROBILINOGEN UR QL STRIP.AUTO: 0.2 E.U./DL
WBC #/AREA URNS AUTO: ABNORMAL /HPF

## 2020-04-03 PROCEDURE — 87086 URINE CULTURE/COLONY COUNT: CPT

## 2020-04-03 PROCEDURE — 81001 URINALYSIS AUTO W/SCOPE: CPT

## 2020-04-03 RX ORDER — CIPROFLOXACIN 500 MG/1
500 TABLET, FILM COATED ORAL EVERY 12 HOURS SCHEDULED
Qty: 10 TABLET | Refills: 0 | Status: SHIPPED | OUTPATIENT
Start: 2020-04-03 | End: 2020-04-08

## 2020-04-04 LAB — BACTERIA UR CULT: NORMAL

## 2020-04-09 ENCOUNTER — TELEMEDICINE (OUTPATIENT)
Dept: INTERNAL MEDICINE CLINIC | Facility: CLINIC | Age: 75
End: 2020-04-09
Payer: MEDICARE

## 2020-04-09 DIAGNOSIS — M54.41 CHRONIC MIDLINE LOW BACK PAIN WITH BILATERAL SCIATICA: ICD-10-CM

## 2020-04-09 DIAGNOSIS — M54.42 CHRONIC MIDLINE LOW BACK PAIN WITH BILATERAL SCIATICA: ICD-10-CM

## 2020-04-09 DIAGNOSIS — N30.00 ACUTE CYSTITIS WITHOUT HEMATURIA: ICD-10-CM

## 2020-04-09 DIAGNOSIS — E11.51 TYPE 2 DIABETES MELLITUS WITH DIABETIC PERIPHERAL ANGIOPATHY WITHOUT GANGRENE, WITH LONG-TERM CURRENT USE OF INSULIN (HCC): Primary | ICD-10-CM

## 2020-04-09 DIAGNOSIS — N18.4 CHRONIC KIDNEY DISEASE, STAGE 4 (SEVERE) (HCC): ICD-10-CM

## 2020-04-09 DIAGNOSIS — G89.29 CHRONIC MIDLINE LOW BACK PAIN WITH BILATERAL SCIATICA: ICD-10-CM

## 2020-04-09 DIAGNOSIS — I70.209 ARTERIOSCLEROSIS OF ARTERY OF EXTREMITY (HCC): ICD-10-CM

## 2020-04-09 DIAGNOSIS — I10 ESSENTIAL HYPERTENSION: ICD-10-CM

## 2020-04-09 DIAGNOSIS — Z79.4 TYPE 2 DIABETES MELLITUS WITH DIABETIC PERIPHERAL ANGIOPATHY WITHOUT GANGRENE, WITH LONG-TERM CURRENT USE OF INSULIN (HCC): Primary | ICD-10-CM

## 2020-04-09 PROCEDURE — 99214 OFFICE O/P EST MOD 30 MIN: CPT | Performed by: INTERNAL MEDICINE

## 2020-04-16 ENCOUNTER — TELEPHONE (OUTPATIENT)
Dept: INTERNAL MEDICINE CLINIC | Facility: CLINIC | Age: 75
End: 2020-04-16

## 2020-04-16 DIAGNOSIS — N30.00 ACUTE CYSTITIS WITHOUT HEMATURIA: Primary | ICD-10-CM

## 2020-04-16 DIAGNOSIS — N39.0 FREQUENT UTI: Primary | ICD-10-CM

## 2020-04-16 DIAGNOSIS — D09.0 CARCINOMA IN SITU OF BLADDER: ICD-10-CM

## 2020-04-16 RX ORDER — CIPROFLOXACIN 500 MG/1
500 TABLET, FILM COATED ORAL EVERY 12 HOURS SCHEDULED
Qty: 10 TABLET | Refills: 0 | Status: SHIPPED | OUTPATIENT
Start: 2020-04-16 | End: 2020-04-17 | Stop reason: SDUPTHER

## 2020-04-17 ENCOUNTER — APPOINTMENT (OUTPATIENT)
Dept: LAB | Facility: CLINIC | Age: 75
End: 2020-04-17
Payer: MEDICARE

## 2020-04-17 DIAGNOSIS — N30.00 ACUTE CYSTITIS WITHOUT HEMATURIA: ICD-10-CM

## 2020-04-17 LAB
BACTERIA UR QL AUTO: ABNORMAL /HPF
BILIRUB UR QL STRIP: NEGATIVE
CLARITY UR: ABNORMAL
COLOR UR: YELLOW
GLUCOSE UR STRIP-MCNC: NEGATIVE MG/DL
HGB UR QL STRIP.AUTO: ABNORMAL
HYALINE CASTS #/AREA URNS LPF: ABNORMAL /LPF
KETONES UR STRIP-MCNC: NEGATIVE MG/DL
LEUKOCYTE ESTERASE UR QL STRIP: ABNORMAL
NITRITE UR QL STRIP: NEGATIVE
NON-SQ EPI CELLS URNS QL MICRO: ABNORMAL /HPF
PH UR STRIP.AUTO: 6 [PH]
PROT UR STRIP-MCNC: ABNORMAL MG/DL
RBC #/AREA URNS AUTO: ABNORMAL /HPF
SP GR UR STRIP.AUTO: 1.01 (ref 1–1.03)
UROBILINOGEN UR QL STRIP.AUTO: 0.2 E.U./DL
WBC #/AREA URNS AUTO: ABNORMAL /HPF

## 2020-04-17 PROCEDURE — 87086 URINE CULTURE/COLONY COUNT: CPT

## 2020-04-17 PROCEDURE — 81001 URINALYSIS AUTO W/SCOPE: CPT

## 2020-04-17 RX ORDER — CIPROFLOXACIN 500 MG/1
500 TABLET, FILM COATED ORAL EVERY 12 HOURS SCHEDULED
Qty: 10 TABLET | Refills: 0 | Status: SHIPPED | OUTPATIENT
Start: 2020-04-17 | End: 2020-04-22

## 2020-04-18 LAB — BACTERIA UR CULT: NORMAL

## 2020-04-24 DIAGNOSIS — E11.51 TYPE 2 DIABETES MELLITUS WITH DIABETIC PERIPHERAL ANGIOPATHY WITHOUT GANGRENE, WITH LONG-TERM CURRENT USE OF INSULIN (HCC): Primary | ICD-10-CM

## 2020-04-24 DIAGNOSIS — Z79.4 TYPE 2 DIABETES MELLITUS WITH DIABETIC PERIPHERAL ANGIOPATHY WITHOUT GANGRENE, WITH LONG-TERM CURRENT USE OF INSULIN (HCC): Primary | ICD-10-CM

## 2020-04-24 DIAGNOSIS — I25.10 CORONARY ARTERY DISEASE INVOLVING NATIVE HEART WITHOUT ANGINA PECTORIS, UNSPECIFIED VESSEL OR LESION TYPE: ICD-10-CM

## 2020-04-25 RX ORDER — FUROSEMIDE 20 MG/1
TABLET ORAL
Qty: 90 TABLET | Refills: 1 | Status: SHIPPED | OUTPATIENT
Start: 2020-04-25 | End: 2020-06-24 | Stop reason: SDUPTHER

## 2020-05-14 ENCOUNTER — OFFICE VISIT (OUTPATIENT)
Dept: INTERNAL MEDICINE CLINIC | Facility: CLINIC | Age: 75
End: 2020-05-14
Payer: MEDICARE

## 2020-05-14 VITALS
RESPIRATION RATE: 14 BRPM | TEMPERATURE: 98.2 F | DIASTOLIC BLOOD PRESSURE: 80 MMHG | SYSTOLIC BLOOD PRESSURE: 120 MMHG | BODY MASS INDEX: 30.08 KG/M2 | WEIGHT: 247 LBS | HEIGHT: 76 IN | HEART RATE: 80 BPM

## 2020-05-14 DIAGNOSIS — E78.5 HYPERLIPIDEMIA, UNSPECIFIED HYPERLIPIDEMIA TYPE: ICD-10-CM

## 2020-05-14 DIAGNOSIS — Z79.4 TYPE 2 DIABETES MELLITUS WITH DIABETIC PERIPHERAL ANGIOPATHY WITHOUT GANGRENE, WITH LONG-TERM CURRENT USE OF INSULIN (HCC): Primary | ICD-10-CM

## 2020-05-14 DIAGNOSIS — I10 ESSENTIAL HYPERTENSION: ICD-10-CM

## 2020-05-14 DIAGNOSIS — E11.51 TYPE 2 DIABETES MELLITUS WITH DIABETIC PERIPHERAL ANGIOPATHY WITHOUT GANGRENE, WITH LONG-TERM CURRENT USE OF INSULIN (HCC): Primary | ICD-10-CM

## 2020-05-14 DIAGNOSIS — N18.4 CHRONIC KIDNEY DISEASE, STAGE 4 (SEVERE) (HCC): ICD-10-CM

## 2020-05-14 DIAGNOSIS — I25.10 CORONARY ARTERY DISEASE INVOLVING NATIVE CORONARY ARTERY OF NATIVE HEART WITHOUT ANGINA PECTORIS: ICD-10-CM

## 2020-05-14 DIAGNOSIS — J30.9 ALLERGIC RHINITIS, UNSPECIFIED SEASONALITY, UNSPECIFIED TRIGGER: ICD-10-CM

## 2020-05-14 DIAGNOSIS — J42 CHRONIC BRONCHITIS, UNSPECIFIED CHRONIC BRONCHITIS TYPE (HCC): ICD-10-CM

## 2020-05-14 DIAGNOSIS — C67.9 MALIGNANT NEOPLASM OF URINARY BLADDER, UNSPECIFIED SITE (HCC): ICD-10-CM

## 2020-05-14 PROCEDURE — 4040F PNEUMOC VAC/ADMIN/RCVD: CPT | Performed by: INTERNAL MEDICINE

## 2020-05-14 PROCEDURE — 1160F RVW MEDS BY RX/DR IN RCRD: CPT | Performed by: INTERNAL MEDICINE

## 2020-05-14 PROCEDURE — 3052F HG A1C>EQUAL 8.0%<EQUAL 9.0%: CPT | Performed by: INTERNAL MEDICINE

## 2020-05-14 PROCEDURE — 3066F NEPHROPATHY DOC TX: CPT | Performed by: INTERNAL MEDICINE

## 2020-05-14 PROCEDURE — 3079F DIAST BP 80-89 MM HG: CPT | Performed by: INTERNAL MEDICINE

## 2020-05-14 PROCEDURE — 3060F POS MICROALBUMINURIA REV: CPT | Performed by: INTERNAL MEDICINE

## 2020-05-14 PROCEDURE — 99214 OFFICE O/P EST MOD 30 MIN: CPT | Performed by: INTERNAL MEDICINE

## 2020-05-14 PROCEDURE — 3008F BODY MASS INDEX DOCD: CPT | Performed by: INTERNAL MEDICINE

## 2020-05-14 PROCEDURE — 3074F SYST BP LT 130 MM HG: CPT | Performed by: INTERNAL MEDICINE

## 2020-05-14 PROCEDURE — 1036F TOBACCO NON-USER: CPT | Performed by: INTERNAL MEDICINE

## 2020-05-14 RX ORDER — AZELASTINE HCL 205.5 UG/1
1 SPRAY NASAL 2 TIMES DAILY
Qty: 30 ML | Refills: 3 | Status: SHIPPED | OUTPATIENT
Start: 2020-05-14

## 2020-05-14 RX ORDER — FLUTICASONE PROPIONATE 50 MCG
SPRAY, SUSPENSION (ML) NASAL
Qty: 1 BOTTLE | Refills: 3 | Status: SHIPPED | OUTPATIENT
Start: 2020-05-14 | End: 2021-10-12

## 2020-06-01 DIAGNOSIS — N30.00 ACUTE CYSTITIS WITHOUT HEMATURIA: Primary | ICD-10-CM

## 2020-06-02 ENCOUNTER — APPOINTMENT (OUTPATIENT)
Dept: LAB | Facility: CLINIC | Age: 75
End: 2020-06-02
Payer: MEDICARE

## 2020-06-02 DIAGNOSIS — N30.00 ACUTE CYSTITIS WITHOUT HEMATURIA: ICD-10-CM

## 2020-06-02 LAB
BACTERIA UR QL AUTO: ABNORMAL /HPF
BILIRUB UR QL STRIP: NEGATIVE
CLARITY UR: ABNORMAL
COLOR UR: YELLOW
GLUCOSE UR STRIP-MCNC: ABNORMAL MG/DL
HGB UR QL STRIP.AUTO: ABNORMAL
HYALINE CASTS #/AREA URNS LPF: ABNORMAL /LPF
KETONES UR STRIP-MCNC: NEGATIVE MG/DL
LEUKOCYTE ESTERASE UR QL STRIP: ABNORMAL
NITRITE UR QL STRIP: NEGATIVE
NON-SQ EPI CELLS URNS QL MICRO: ABNORMAL /HPF
PH UR STRIP.AUTO: 6 [PH]
PROT UR STRIP-MCNC: ABNORMAL MG/DL
RBC #/AREA URNS AUTO: ABNORMAL /HPF
SP GR UR STRIP.AUTO: 1.02 (ref 1–1.03)
UROBILINOGEN UR QL STRIP.AUTO: 0.2 E.U./DL
WBC #/AREA URNS AUTO: ABNORMAL /HPF

## 2020-06-02 PROCEDURE — 81001 URINALYSIS AUTO W/SCOPE: CPT

## 2020-06-02 PROCEDURE — 87086 URINE CULTURE/COLONY COUNT: CPT

## 2020-06-03 ENCOUNTER — TELEPHONE (OUTPATIENT)
Dept: INTERNAL MEDICINE CLINIC | Facility: CLINIC | Age: 75
End: 2020-06-03

## 2020-06-03 DIAGNOSIS — I25.10 CORONARY ARTERY DISEASE INVOLVING NATIVE CORONARY ARTERY OF NATIVE HEART WITHOUT ANGINA PECTORIS: ICD-10-CM

## 2020-06-03 LAB — BACTERIA UR CULT: NORMAL

## 2020-06-03 RX ORDER — ISOSORBIDE MONONITRATE 30 MG/1
TABLET, EXTENDED RELEASE ORAL
Qty: 270 TABLET | Refills: 0 | Status: SHIPPED | OUTPATIENT
Start: 2020-06-03 | End: 2020-09-29

## 2020-06-09 ENCOUNTER — HOSPITAL ENCOUNTER (EMERGENCY)
Facility: HOSPITAL | Age: 75
Discharge: HOME | End: 2020-06-10
Attending: EMERGENCY MEDICINE | Admitting: EMERGENCY MEDICINE
Payer: MEDICARE

## 2020-06-09 ENCOUNTER — APPOINTMENT (EMERGENCY)
Dept: RADIOLOGY | Facility: HOSPITAL | Age: 75
End: 2020-06-09
Payer: MEDICARE

## 2020-06-09 VITALS
HEART RATE: 84 BPM | BODY MASS INDEX: 29.44 KG/M2 | SYSTOLIC BLOOD PRESSURE: 131 MMHG | DIASTOLIC BLOOD PRESSURE: 81 MMHG | WEIGHT: 241.84 LBS | TEMPERATURE: 98.7 F | OXYGEN SATURATION: 97 % | RESPIRATION RATE: 17 BRPM

## 2020-06-09 DIAGNOSIS — N39.0 UTI (URINARY TRACT INFECTION): Primary | ICD-10-CM

## 2020-06-09 LAB
ALBUMIN SERPL BCP-MCNC: 3.8 G/DL (ref 3.5–5)
ALP SERPL-CCNC: 103 U/L (ref 46–116)
ALT SERPL W P-5'-P-CCNC: 67 U/L (ref 12–78)
ANION GAP SERPL CALCULATED.3IONS-SCNC: 9 MMOL/L (ref 4–13)
AST SERPL W P-5'-P-CCNC: 41 U/L (ref 5–45)
BACTERIA UR QL AUTO: ABNORMAL /HPF
BASOPHILS # BLD AUTO: 0.03 THOUSANDS/ΜL (ref 0–0.1)
BASOPHILS NFR BLD AUTO: 0 % (ref 0–1)
BILIRUB SERPL-MCNC: 0.4 MG/DL (ref 0.2–1)
BILIRUB UR QL STRIP: NEGATIVE
BUN SERPL-MCNC: 38 MG/DL (ref 5–25)
CALCIUM SERPL-MCNC: 8.6 MG/DL (ref 8.3–10.1)
CHLORIDE SERPL-SCNC: 103 MMOL/L (ref 100–108)
CLARITY UR: ABNORMAL
CO2 SERPL-SCNC: 26 MMOL/L (ref 21–32)
COLOR UR: YELLOW
CREAT SERPL-MCNC: 2.65 MG/DL (ref 0.6–1.3)
EOSINOPHIL # BLD AUTO: 0.14 THOUSAND/ΜL (ref 0–0.61)
EOSINOPHIL NFR BLD AUTO: 2 % (ref 0–6)
ERYTHROCYTE [DISTWIDTH] IN BLOOD BY AUTOMATED COUNT: 13.2 % (ref 11.6–15.1)
GFR SERPL CREATININE-BSD FRML MDRD: 26 ML/MIN/1.73SQ M
GLUCOSE SERPL-MCNC: 199 MG/DL (ref 65–140)
GLUCOSE UR STRIP-MCNC: NEGATIVE MG/DL
HCT VFR BLD AUTO: 34.4 % (ref 36.5–49.3)
HGB BLD-MCNC: 11.6 G/DL (ref 12–17)
HGB UR QL STRIP.AUTO: ABNORMAL
IMM GRANULOCYTES # BLD AUTO: 0.04 THOUSAND/UL (ref 0–0.2)
IMM GRANULOCYTES NFR BLD AUTO: 0 % (ref 0–2)
KETONES UR STRIP-MCNC: NEGATIVE MG/DL
LEUKOCYTE ESTERASE UR QL STRIP: ABNORMAL
LYMPHOCYTES # BLD AUTO: 1.54 THOUSANDS/ΜL (ref 0.6–4.47)
LYMPHOCYTES NFR BLD AUTO: 16 % (ref 14–44)
MCH RBC QN AUTO: 31.8 PG (ref 26.8–34.3)
MCHC RBC AUTO-ENTMCNC: 33.7 G/DL (ref 31.4–37.4)
MCV RBC AUTO: 94 FL (ref 82–98)
MONOCYTES # BLD AUTO: 0.62 THOUSAND/ΜL (ref 0.17–1.22)
MONOCYTES NFR BLD AUTO: 7 % (ref 4–12)
NEUTROPHILS # BLD AUTO: 7.14 THOUSANDS/ΜL (ref 1.85–7.62)
NEUTS SEG NFR BLD AUTO: 75 % (ref 43–75)
NITRITE UR QL STRIP: NEGATIVE
NON-SQ EPI CELLS URNS QL MICRO: ABNORMAL /HPF
NRBC BLD AUTO-RTO: 0 /100 WBCS
PH UR STRIP.AUTO: 5.5 [PH] (ref 4.5–8)
PLATELET # BLD AUTO: 144 THOUSANDS/UL (ref 149–390)
PMV BLD AUTO: 10.9 FL (ref 8.9–12.7)
POTASSIUM SERPL-SCNC: 4.8 MMOL/L (ref 3.5–5.3)
PROT SERPL-MCNC: 7.4 G/DL (ref 6.4–8.2)
PROT UR STRIP-MCNC: >=300 MG/DL
RBC # BLD AUTO: 3.65 MILLION/UL (ref 3.88–5.62)
RBC #/AREA URNS AUTO: ABNORMAL /HPF
SODIUM SERPL-SCNC: 138 MMOL/L (ref 136–145)
SP GR UR STRIP.AUTO: 1.02 (ref 1–1.03)
UROBILINOGEN UR QL STRIP.AUTO: 0.2 E.U./DL
WBC # BLD AUTO: 9.51 THOUSAND/UL (ref 4.31–10.16)
WBC #/AREA URNS AUTO: ABNORMAL /HPF

## 2020-06-09 PROCEDURE — 71046 X-RAY EXAM CHEST 2 VIEWS: CPT

## 2020-06-09 PROCEDURE — 85025 COMPLETE CBC W/AUTO DIFF WBC: CPT | Performed by: EMERGENCY MEDICINE

## 2020-06-09 PROCEDURE — 87086 URINE CULTURE/COLONY COUNT: CPT

## 2020-06-09 PROCEDURE — 81001 URINALYSIS AUTO W/SCOPE: CPT

## 2020-06-09 PROCEDURE — 99284 EMERGENCY DEPT VISIT MOD MDM: CPT

## 2020-06-09 PROCEDURE — 96365 THER/PROPH/DIAG IV INF INIT: CPT

## 2020-06-09 PROCEDURE — 36415 COLL VENOUS BLD VENIPUNCTURE: CPT | Performed by: EMERGENCY MEDICINE

## 2020-06-09 PROCEDURE — 99284 EMERGENCY DEPT VISIT MOD MDM: CPT | Performed by: EMERGENCY MEDICINE

## 2020-06-09 PROCEDURE — 80053 COMPREHEN METABOLIC PANEL: CPT | Performed by: EMERGENCY MEDICINE

## 2020-06-09 RX ORDER — CEFPODOXIME PROXETIL 100 MG/1
100 TABLET, FILM COATED ORAL 2 TIMES DAILY
Qty: 14 TABLET | Refills: 0 | Status: SHIPPED | OUTPATIENT
Start: 2020-06-09 | End: 2020-06-16

## 2020-06-09 RX ADMIN — SODIUM CHLORIDE 500 ML: 0.9 INJECTION, SOLUTION INTRAVENOUS at 23:28

## 2020-06-09 RX ADMIN — CEFTRIAXONE SODIUM 1000 MG: 10 INJECTION, POWDER, FOR SOLUTION INTRAVENOUS at 23:29

## 2020-06-10 PROCEDURE — 96366 THER/PROPH/DIAG IV INF ADDON: CPT

## 2020-06-11 LAB — BACTERIA UR CULT: NORMAL

## 2020-06-19 ENCOUNTER — TELEPHONE (OUTPATIENT)
Dept: INTERNAL MEDICINE CLINIC | Facility: CLINIC | Age: 75
End: 2020-06-19

## 2020-06-19 DIAGNOSIS — Z79.4 TYPE 2 DIABETES MELLITUS WITH COMPLICATION, WITH LONG-TERM CURRENT USE OF INSULIN (HCC): Primary | ICD-10-CM

## 2020-06-19 DIAGNOSIS — E11.8 TYPE 2 DIABETES MELLITUS WITH COMPLICATION, WITH LONG-TERM CURRENT USE OF INSULIN (HCC): ICD-10-CM

## 2020-06-19 DIAGNOSIS — R93.89 ABNORMAL CHEST X-RAY: Primary | ICD-10-CM

## 2020-06-19 DIAGNOSIS — Z79.4 TYPE 2 DIABETES MELLITUS WITH COMPLICATION, WITH LONG-TERM CURRENT USE OF INSULIN (HCC): ICD-10-CM

## 2020-06-19 DIAGNOSIS — E11.8 TYPE 2 DIABETES MELLITUS WITH COMPLICATION, WITH LONG-TERM CURRENT USE OF INSULIN (HCC): Primary | ICD-10-CM

## 2020-06-19 RX ORDER — INSULIN GLARGINE 100 [IU]/ML
INJECTION, SOLUTION SUBCUTANEOUS
Qty: 5 PEN | Refills: 0 | Status: SHIPPED | OUTPATIENT
Start: 2020-06-19 | End: 2020-07-13 | Stop reason: SDUPTHER

## 2020-06-23 ENCOUNTER — TRANSCRIBE ORDERS (OUTPATIENT)
Dept: ADMINISTRATIVE | Facility: HOSPITAL | Age: 75
End: 2020-06-23

## 2020-06-23 DIAGNOSIS — M46.26 OSTEOMYELITIS OF LUMBAR SPINE (HCC): ICD-10-CM

## 2020-06-23 DIAGNOSIS — M48.00 SPINAL STENOSIS, UNSPECIFIED SPINAL REGION: ICD-10-CM

## 2020-06-23 DIAGNOSIS — M43.16 SPONDYLOLISTHESIS, LUMBAR REGION: Primary | ICD-10-CM

## 2020-06-24 ENCOUNTER — TELEPHONE (OUTPATIENT)
Dept: INTERNAL MEDICINE CLINIC | Facility: CLINIC | Age: 75
End: 2020-06-24

## 2020-06-24 ENCOUNTER — APPOINTMENT (OUTPATIENT)
Dept: LAB | Facility: HOSPITAL | Age: 75
End: 2020-06-24
Attending: INTERNAL MEDICINE
Payer: MEDICARE

## 2020-06-24 ENCOUNTER — TELEPHONE (OUTPATIENT)
Dept: ENDOCRINOLOGY | Facility: HOSPITAL | Age: 75
End: 2020-06-24

## 2020-06-24 ENCOUNTER — OFFICE VISIT (OUTPATIENT)
Dept: ENDOCRINOLOGY | Facility: HOSPITAL | Age: 75
End: 2020-06-24
Payer: MEDICARE

## 2020-06-24 VITALS
SYSTOLIC BLOOD PRESSURE: 106 MMHG | HEART RATE: 76 BPM | HEIGHT: 76 IN | TEMPERATURE: 97.5 F | BODY MASS INDEX: 29.08 KG/M2 | WEIGHT: 238.8 LBS | DIASTOLIC BLOOD PRESSURE: 58 MMHG

## 2020-06-24 DIAGNOSIS — I25.10 CORONARY ARTERY DISEASE INVOLVING NATIVE CORONARY ARTERY OF NATIVE HEART WITHOUT ANGINA PECTORIS: ICD-10-CM

## 2020-06-24 DIAGNOSIS — C67.9 MALIGNANT NEOPLASM OF URINARY BLADDER, UNSPECIFIED SITE (HCC): ICD-10-CM

## 2020-06-24 DIAGNOSIS — N30.00 ACUTE CYSTITIS WITHOUT HEMATURIA: ICD-10-CM

## 2020-06-24 DIAGNOSIS — J30.9 ALLERGIC RHINITIS, UNSPECIFIED SEASONALITY, UNSPECIFIED TRIGGER: ICD-10-CM

## 2020-06-24 DIAGNOSIS — N18.30 CKD (CHRONIC KIDNEY DISEASE) STAGE 3, GFR 30-59 ML/MIN (HCC): Primary | ICD-10-CM

## 2020-06-24 DIAGNOSIS — N18.4 CHRONIC KIDNEY DISEASE, STAGE 4 (SEVERE) (HCC): ICD-10-CM

## 2020-06-24 DIAGNOSIS — I10 ESSENTIAL HYPERTENSION: ICD-10-CM

## 2020-06-24 DIAGNOSIS — E11.8 TYPE 2 DIABETES MELLITUS WITH COMPLICATION (HCC): ICD-10-CM

## 2020-06-24 DIAGNOSIS — E78.5 HYPERLIPIDEMIA, UNSPECIFIED HYPERLIPIDEMIA TYPE: ICD-10-CM

## 2020-06-24 DIAGNOSIS — Z79.4 TYPE 2 DIABETES MELLITUS WITH DIABETIC PERIPHERAL ANGIOPATHY WITHOUT GANGRENE, WITH LONG-TERM CURRENT USE OF INSULIN (HCC): ICD-10-CM

## 2020-06-24 DIAGNOSIS — I25.10 CORONARY ARTERY DISEASE INVOLVING NATIVE HEART WITHOUT ANGINA PECTORIS, UNSPECIFIED VESSEL OR LESION TYPE: ICD-10-CM

## 2020-06-24 DIAGNOSIS — I10 ESSENTIAL HYPERTENSION: Primary | ICD-10-CM

## 2020-06-24 DIAGNOSIS — E11.51 TYPE 2 DIABETES MELLITUS WITH DIABETIC PERIPHERAL ANGIOPATHY WITHOUT GANGRENE, WITH LONG-TERM CURRENT USE OF INSULIN (HCC): ICD-10-CM

## 2020-06-24 LAB
25(OH)D3 SERPL-MCNC: 41.3 NG/ML (ref 30–100)
ALBUMIN SERPL BCP-MCNC: 3.7 G/DL (ref 3.5–5)
ALP SERPL-CCNC: 100 U/L (ref 46–116)
ALT SERPL W P-5'-P-CCNC: 96 U/L (ref 12–78)
ANION GAP SERPL CALCULATED.3IONS-SCNC: 10 MMOL/L (ref 4–13)
AST SERPL W P-5'-P-CCNC: 69 U/L (ref 5–45)
BACTERIA UR QL AUTO: ABNORMAL /HPF
BASOPHILS # BLD AUTO: 0.04 THOUSANDS/ΜL (ref 0–0.1)
BASOPHILS NFR BLD AUTO: 0 % (ref 0–1)
BILIRUB SERPL-MCNC: 0.45 MG/DL (ref 0.2–1)
BILIRUB UR QL STRIP: NEGATIVE
BUN SERPL-MCNC: 51 MG/DL (ref 5–25)
CALCIUM SERPL-MCNC: 9.1 MG/DL (ref 8.3–10.1)
CHLORIDE SERPL-SCNC: 104 MMOL/L (ref 100–108)
CHOLEST SERPL-MCNC: 134 MG/DL (ref 50–200)
CLARITY UR: ABNORMAL
CO2 SERPL-SCNC: 24 MMOL/L (ref 21–32)
COLOR UR: YELLOW
CREAT SERPL-MCNC: 3.58 MG/DL (ref 0.6–1.3)
CREAT UR-MCNC: 107 MG/DL
EOSINOPHIL # BLD AUTO: 0.13 THOUSAND/ΜL (ref 0–0.61)
EOSINOPHIL NFR BLD AUTO: 1 % (ref 0–6)
ERYTHROCYTE [DISTWIDTH] IN BLOOD BY AUTOMATED COUNT: 13.2 % (ref 11.6–15.1)
EST. AVERAGE GLUCOSE BLD GHB EST-MCNC: 169 MG/DL
GFR SERPL CREATININE-BSD FRML MDRD: 18 ML/MIN/1.73SQ M
GGT SERPL-CCNC: 102 U/L (ref 5–85)
GLUCOSE P FAST SERPL-MCNC: 94 MG/DL (ref 65–99)
GLUCOSE UR STRIP-MCNC: NEGATIVE MG/DL
HBA1C MFR BLD: 7.5 %
HCT VFR BLD AUTO: 35.3 % (ref 36.5–49.3)
HDLC SERPL-MCNC: 38 MG/DL
HGB BLD-MCNC: 11.8 G/DL (ref 12–17)
HGB UR QL STRIP.AUTO: ABNORMAL
IMM GRANULOCYTES # BLD AUTO: 0.06 THOUSAND/UL (ref 0–0.2)
IMM GRANULOCYTES NFR BLD AUTO: 1 % (ref 0–2)
KETONES UR STRIP-MCNC: NEGATIVE MG/DL
LDLC SERPL CALC-MCNC: 73 MG/DL (ref 0–100)
LEUKOCYTE ESTERASE UR QL STRIP: ABNORMAL
LYMPHOCYTES # BLD AUTO: 1.57 THOUSANDS/ΜL (ref 0.6–4.47)
LYMPHOCYTES NFR BLD AUTO: 16 % (ref 14–44)
MAGNESIUM SERPL-MCNC: 2.4 MG/DL (ref 1.6–2.6)
MCH RBC QN AUTO: 32 PG (ref 26.8–34.3)
MCHC RBC AUTO-ENTMCNC: 33.4 G/DL (ref 31.4–37.4)
MCV RBC AUTO: 96 FL (ref 82–98)
MICROALBUMIN UR-MCNC: 1520 MG/L (ref 0–20)
MICROALBUMIN/CREAT 24H UR: 1421 MG/G CREATININE (ref 0–30)
MONOCYTES # BLD AUTO: 0.9 THOUSAND/ΜL (ref 0.17–1.22)
MONOCYTES NFR BLD AUTO: 9 % (ref 4–12)
NEUTROPHILS # BLD AUTO: 7.22 THOUSANDS/ΜL (ref 1.85–7.62)
NEUTS SEG NFR BLD AUTO: 73 % (ref 43–75)
NITRITE UR QL STRIP: NEGATIVE
NON-SQ EPI CELLS URNS QL MICRO: ABNORMAL /HPF
NRBC BLD AUTO-RTO: 0 /100 WBCS
OTHER STN SPEC: ABNORMAL
PH UR STRIP.AUTO: 6 [PH]
PLATELET # BLD AUTO: 167 THOUSANDS/UL (ref 149–390)
PMV BLD AUTO: 9.9 FL (ref 8.9–12.7)
POTASSIUM SERPL-SCNC: 5.4 MMOL/L (ref 3.5–5.3)
PROT SERPL-MCNC: 7.6 G/DL (ref 6.4–8.2)
PROT UR STRIP-MCNC: >=300 MG/DL
RBC # BLD AUTO: 3.69 MILLION/UL (ref 3.88–5.62)
RBC #/AREA URNS AUTO: ABNORMAL /HPF
SODIUM SERPL-SCNC: 138 MMOL/L (ref 136–145)
SP GR UR STRIP.AUTO: 1.02 (ref 1–1.03)
TRIGL SERPL-MCNC: 116 MG/DL
TSH SERPL DL<=0.05 MIU/L-ACNC: 0.98 UIU/ML (ref 0.36–3.74)
UROBILINOGEN UR QL STRIP.AUTO: 0.2 E.U./DL
WBC # BLD AUTO: 9.92 THOUSAND/UL (ref 4.31–10.16)
WBC #/AREA URNS AUTO: ABNORMAL /HPF

## 2020-06-24 PROCEDURE — 82570 ASSAY OF URINE CREATININE: CPT | Performed by: INTERNAL MEDICINE

## 2020-06-24 PROCEDURE — 3052F HG A1C>EQUAL 8.0%<EQUAL 9.0%: CPT | Performed by: INTERNAL MEDICINE

## 2020-06-24 PROCEDURE — 4040F PNEUMOC VAC/ADMIN/RCVD: CPT | Performed by: INTERNAL MEDICINE

## 2020-06-24 PROCEDURE — 83735 ASSAY OF MAGNESIUM: CPT

## 2020-06-24 PROCEDURE — 87086 URINE CULTURE/COLONY COUNT: CPT

## 2020-06-24 PROCEDURE — 82043 UR ALBUMIN QUANTITATIVE: CPT | Performed by: INTERNAL MEDICINE

## 2020-06-24 PROCEDURE — 80061 LIPID PANEL: CPT

## 2020-06-24 PROCEDURE — 3008F BODY MASS INDEX DOCD: CPT | Performed by: INTERNAL MEDICINE

## 2020-06-24 PROCEDURE — 3060F POS MICROALBUMINURIA REV: CPT | Performed by: INTERNAL MEDICINE

## 2020-06-24 PROCEDURE — 82977 ASSAY OF GGT: CPT

## 2020-06-24 PROCEDURE — 1160F RVW MEDS BY RX/DR IN RCRD: CPT | Performed by: INTERNAL MEDICINE

## 2020-06-24 PROCEDURE — 3078F DIAST BP <80 MM HG: CPT | Performed by: INTERNAL MEDICINE

## 2020-06-24 PROCEDURE — 82306 VITAMIN D 25 HYDROXY: CPT

## 2020-06-24 PROCEDURE — 99214 OFFICE O/P EST MOD 30 MIN: CPT | Performed by: INTERNAL MEDICINE

## 2020-06-24 PROCEDURE — 84443 ASSAY THYROID STIM HORMONE: CPT

## 2020-06-24 PROCEDURE — 85025 COMPLETE CBC W/AUTO DIFF WBC: CPT

## 2020-06-24 PROCEDURE — 1036F TOBACCO NON-USER: CPT | Performed by: INTERNAL MEDICINE

## 2020-06-24 PROCEDURE — 36415 COLL VENOUS BLD VENIPUNCTURE: CPT

## 2020-06-24 PROCEDURE — 80053 COMPREHEN METABOLIC PANEL: CPT

## 2020-06-24 PROCEDURE — 83036 HEMOGLOBIN GLYCOSYLATED A1C: CPT

## 2020-06-24 PROCEDURE — 3066F NEPHROPATHY DOC TX: CPT | Performed by: INTERNAL MEDICINE

## 2020-06-24 PROCEDURE — 81001 URINALYSIS AUTO W/SCOPE: CPT | Performed by: INTERNAL MEDICINE

## 2020-06-24 PROCEDURE — 3074F SYST BP LT 130 MM HG: CPT | Performed by: INTERNAL MEDICINE

## 2020-06-24 RX ORDER — BIMATOPROST 0.01 %
DROPS OPHTHALMIC (EYE)
Status: ON HOLD | COMMUNITY
Start: 2020-06-23 | End: 2020-11-19 | Stop reason: ALTCHOICE

## 2020-06-24 RX ORDER — FUROSEMIDE 20 MG/1
TABLET ORAL
Qty: 90 TABLET | Refills: 1 | Status: ON HOLD
Start: 2020-06-24 | End: 2021-02-13

## 2020-06-24 RX ORDER — INSULIN ASPART 100 [IU]/ML
INJECTION, SOLUTION INTRAVENOUS; SUBCUTANEOUS
Qty: 15 PEN | Refills: 1
Start: 2020-06-24 | End: 2020-07-13 | Stop reason: SDUPTHER

## 2020-06-25 ENCOUNTER — TELEPHONE (OUTPATIENT)
Dept: INTERNAL MEDICINE CLINIC | Facility: CLINIC | Age: 75
End: 2020-06-25

## 2020-06-25 DIAGNOSIS — N30.00 ACUTE CYSTITIS WITHOUT HEMATURIA: Primary | ICD-10-CM

## 2020-06-25 RX ORDER — CIPROFLOXACIN 250 MG/1
TABLET, FILM COATED ORAL
Qty: 7 TABLET | Refills: 0 | Status: ON HOLD | OUTPATIENT
Start: 2020-06-25 | End: 2020-06-29

## 2020-06-26 LAB — BACTERIA UR CULT: NORMAL

## 2020-06-29 ENCOUNTER — APPOINTMENT (EMERGENCY)
Dept: CT IMAGING | Facility: HOSPITAL | Age: 75
DRG: 629 | End: 2020-06-29
Payer: MEDICARE

## 2020-06-29 ENCOUNTER — TELEPHONE (OUTPATIENT)
Dept: INTERNAL MEDICINE CLINIC | Facility: CLINIC | Age: 75
End: 2020-06-29

## 2020-06-29 ENCOUNTER — HOSPITAL ENCOUNTER (INPATIENT)
Facility: HOSPITAL | Age: 75
LOS: 9 days | Discharge: HOME | DRG: 629 | End: 2020-07-08
Attending: EMERGENCY MEDICINE | Admitting: INTERNAL MEDICINE
Payer: MEDICARE

## 2020-06-29 DIAGNOSIS — R30.0 DYSURIA: Primary | ICD-10-CM

## 2020-06-29 DIAGNOSIS — R93.89 ABNORMAL CT SCAN: ICD-10-CM

## 2020-06-29 DIAGNOSIS — Z78.9 FAILURE OF OUTPATIENT TREATMENT: ICD-10-CM

## 2020-06-29 DIAGNOSIS — C67.9 BLADDER CANCER (HCC): ICD-10-CM

## 2020-06-29 DIAGNOSIS — R93.7 ABNORMAL CT SCAN, LUMBAR SPINE: ICD-10-CM

## 2020-06-29 DIAGNOSIS — R79.89 ELEVATED LFTS: ICD-10-CM

## 2020-06-29 DIAGNOSIS — N28.9 RENAL INSUFFICIENCY: ICD-10-CM

## 2020-06-29 PROBLEM — R74.01 TRANSAMINITIS: Status: ACTIVE | Noted: 2020-06-29

## 2020-06-29 LAB
ALBUMIN SERPL BCP-MCNC: 3.8 G/DL (ref 3.5–5)
ALP SERPL-CCNC: 100 U/L (ref 46–116)
ALT SERPL W P-5'-P-CCNC: 97 U/L (ref 12–78)
ANION GAP SERPL CALCULATED.3IONS-SCNC: 9 MMOL/L (ref 4–13)
AST SERPL W P-5'-P-CCNC: 50 U/L (ref 5–45)
BACTERIA UR QL AUTO: ABNORMAL /HPF
BASOPHILS # BLD AUTO: 0.05 THOUSANDS/ΜL (ref 0–0.1)
BASOPHILS NFR BLD AUTO: 1 % (ref 0–1)
BILIRUB SERPL-MCNC: 0.36 MG/DL (ref 0.2–1)
BILIRUB UR QL STRIP: NEGATIVE
BUN SERPL-MCNC: 42 MG/DL (ref 5–25)
CALCIUM SERPL-MCNC: 8.6 MG/DL (ref 8.3–10.1)
CHLORIDE SERPL-SCNC: 104 MMOL/L (ref 100–108)
CLARITY UR: CLEAR
CO2 SERPL-SCNC: 24 MMOL/L (ref 21–32)
COLOR UR: YELLOW
COLOR, POC: YELLOW
CREAT SERPL-MCNC: 2.62 MG/DL (ref 0.6–1.3)
EOSINOPHIL # BLD AUTO: 0.22 THOUSAND/ΜL (ref 0–0.61)
EOSINOPHIL NFR BLD AUTO: 2 % (ref 0–6)
ERYTHROCYTE [DISTWIDTH] IN BLOOD BY AUTOMATED COUNT: 13.2 % (ref 11.6–15.1)
ERYTHROCYTE [SEDIMENTATION RATE] IN BLOOD: 24 MM/HOUR (ref 1–20)
GFR SERPL CREATININE-BSD FRML MDRD: 26 ML/MIN/1.73SQ M
GLUCOSE SERPL-MCNC: 102 MG/DL (ref 65–140)
GLUCOSE SERPL-MCNC: 276 MG/DL (ref 65–140)
GLUCOSE UR STRIP-MCNC: NEGATIVE MG/DL
HCT VFR BLD AUTO: 36.3 % (ref 36.5–49.3)
HGB BLD-MCNC: 12.1 G/DL (ref 12–17)
HGB UR QL STRIP.AUTO: ABNORMAL
IMM GRANULOCYTES # BLD AUTO: 0.05 THOUSAND/UL (ref 0–0.2)
IMM GRANULOCYTES NFR BLD AUTO: 1 % (ref 0–2)
KETONES UR STRIP-MCNC: NEGATIVE MG/DL
LEUKOCYTE ESTERASE UR QL STRIP: ABNORMAL
LIPASE SERPL-CCNC: 309 U/L (ref 73–393)
LYMPHOCYTES # BLD AUTO: 2.62 THOUSANDS/ΜL (ref 0.6–4.47)
LYMPHOCYTES NFR BLD AUTO: 28 % (ref 14–44)
MCH RBC QN AUTO: 31.5 PG (ref 26.8–34.3)
MCHC RBC AUTO-ENTMCNC: 33.3 G/DL (ref 31.4–37.4)
MCV RBC AUTO: 95 FL (ref 82–98)
MONOCYTES # BLD AUTO: 0.68 THOUSAND/ΜL (ref 0.17–1.22)
MONOCYTES NFR BLD AUTO: 7 % (ref 4–12)
NEUTROPHILS # BLD AUTO: 5.87 THOUSANDS/ΜL (ref 1.85–7.62)
NEUTS SEG NFR BLD AUTO: 61 % (ref 43–75)
NITRITE UR QL STRIP: NEGATIVE
NON-SQ EPI CELLS URNS QL MICRO: ABNORMAL /HPF
NRBC BLD AUTO-RTO: 0 /100 WBCS
PH UR STRIP.AUTO: 5.5 [PH] (ref 4.5–8)
PLATELET # BLD AUTO: 187 THOUSANDS/UL (ref 149–390)
PMV BLD AUTO: 10.2 FL (ref 8.9–12.7)
POTASSIUM SERPL-SCNC: 4.7 MMOL/L (ref 3.5–5.3)
PROT SERPL-MCNC: 7.6 G/DL (ref 6.4–8.2)
PROT UR STRIP-MCNC: >=300 MG/DL
RBC # BLD AUTO: 3.84 MILLION/UL (ref 3.88–5.62)
RBC #/AREA URNS AUTO: ABNORMAL /HPF
SODIUM SERPL-SCNC: 137 MMOL/L (ref 136–145)
SP GR UR STRIP.AUTO: 1.02 (ref 1–1.03)
UROBILINOGEN UR QL STRIP.AUTO: 0.2 E.U./DL
WBC # BLD AUTO: 9.49 THOUSAND/UL (ref 4.31–10.16)
WBC #/AREA URNS AUTO: ABNORMAL /HPF

## 2020-06-29 PROCEDURE — 87040 BLOOD CULTURE FOR BACTERIA: CPT | Performed by: EMERGENCY MEDICINE

## 2020-06-29 PROCEDURE — 74176 CT ABD & PELVIS W/O CONTRAST: CPT

## 2020-06-29 PROCEDURE — 36415 COLL VENOUS BLD VENIPUNCTURE: CPT | Performed by: EMERGENCY MEDICINE

## 2020-06-29 PROCEDURE — 99284 EMERGENCY DEPT VISIT MOD MDM: CPT | Performed by: EMERGENCY MEDICINE

## 2020-06-29 PROCEDURE — 80053 COMPREHEN METABOLIC PANEL: CPT | Performed by: EMERGENCY MEDICINE

## 2020-06-29 PROCEDURE — 87086 URINE CULTURE/COLONY COUNT: CPT

## 2020-06-29 PROCEDURE — 85652 RBC SED RATE AUTOMATED: CPT | Performed by: EMERGENCY MEDICINE

## 2020-06-29 PROCEDURE — 85025 COMPLETE CBC W/AUTO DIFF WBC: CPT | Performed by: EMERGENCY MEDICINE

## 2020-06-29 PROCEDURE — 81001 URINALYSIS AUTO W/SCOPE: CPT

## 2020-06-29 PROCEDURE — 71250 CT THORAX DX C-: CPT

## 2020-06-29 PROCEDURE — 99223 1ST HOSP IP/OBS HIGH 75: CPT | Performed by: PHYSICIAN ASSISTANT

## 2020-06-29 PROCEDURE — 99285 EMERGENCY DEPT VISIT HI MDM: CPT

## 2020-06-29 PROCEDURE — 83690 ASSAY OF LIPASE: CPT | Performed by: EMERGENCY MEDICINE

## 2020-06-29 PROCEDURE — 82948 REAGENT STRIP/BLOOD GLUCOSE: CPT

## 2020-06-29 RX ORDER — ONDANSETRON 2 MG/ML
4 INJECTION INTRAMUSCULAR; INTRAVENOUS EVERY 6 HOURS PRN
Status: DISCONTINUED | OUTPATIENT
Start: 2020-06-29 | End: 2020-07-08 | Stop reason: HOSPADM

## 2020-06-29 RX ORDER — METOPROLOL SUCCINATE 50 MG/1
100 TABLET, EXTENDED RELEASE ORAL DAILY
Status: DISCONTINUED | OUTPATIENT
Start: 2020-06-30 | End: 2020-07-08 | Stop reason: HOSPADM

## 2020-06-29 RX ORDER — INSULIN GLARGINE 100 [IU]/ML
30 INJECTION, SOLUTION SUBCUTANEOUS
Status: DISCONTINUED | OUTPATIENT
Start: 2020-06-29 | End: 2020-07-08 | Stop reason: HOSPADM

## 2020-06-29 RX ORDER — HEPARIN SODIUM 5000 [USP'U]/ML
5000 INJECTION, SOLUTION INTRAVENOUS; SUBCUTANEOUS EVERY 8 HOURS SCHEDULED
Status: DISCONTINUED | OUTPATIENT
Start: 2020-06-29 | End: 2020-07-08 | Stop reason: HOSPADM

## 2020-06-29 RX ORDER — ALPRAZOLAM 0.25 MG/1
0.25 TABLET ORAL 2 TIMES DAILY PRN
Status: DISCONTINUED | OUTPATIENT
Start: 2020-06-29 | End: 2020-07-08 | Stop reason: HOSPADM

## 2020-06-29 RX ORDER — ASPIRIN 81 MG/1
81 TABLET, CHEWABLE ORAL DAILY
Status: DISCONTINUED | OUTPATIENT
Start: 2020-06-30 | End: 2020-07-08 | Stop reason: HOSPADM

## 2020-06-29 RX ORDER — PANTOPRAZOLE SODIUM 40 MG/1
40 TABLET, DELAYED RELEASE ORAL
Status: DISCONTINUED | OUTPATIENT
Start: 2020-06-30 | End: 2020-07-08 | Stop reason: HOSPADM

## 2020-06-29 RX ORDER — GABAPENTIN 300 MG/1
600 CAPSULE ORAL DAILY
Status: DISCONTINUED | OUTPATIENT
Start: 2020-06-30 | End: 2020-07-08 | Stop reason: HOSPADM

## 2020-06-29 RX ORDER — ACETAMINOPHEN 325 MG/1
650 TABLET ORAL EVERY 6 HOURS PRN
Status: DISCONTINUED | OUTPATIENT
Start: 2020-06-29 | End: 2020-07-08 | Stop reason: HOSPADM

## 2020-06-29 RX ADMIN — INSULIN LISPRO 2 UNITS: 100 INJECTION, SOLUTION INTRAVENOUS; SUBCUTANEOUS at 22:17

## 2020-06-29 RX ADMIN — ALPRAZOLAM 0.25 MG: 0.25 TABLET ORAL at 23:58

## 2020-06-29 RX ADMIN — PIPERACILLIN SODIUM AND TAZOBACTAM SODIUM 3.38 G: 36; 4.5 INJECTION, POWDER, FOR SOLUTION INTRAVENOUS at 19:24

## 2020-06-29 RX ADMIN — HEPARIN SODIUM 5000 UNITS: 5000 INJECTION INTRAVENOUS; SUBCUTANEOUS at 22:26

## 2020-06-29 RX ADMIN — VANCOMYCIN HYDROCHLORIDE 1500 MG: 1 INJECTION, POWDER, LYOPHILIZED, FOR SOLUTION INTRAVENOUS at 20:12

## 2020-06-29 RX ADMIN — INSULIN GLARGINE 30 UNITS: 100 INJECTION, SOLUTION SUBCUTANEOUS at 22:18

## 2020-06-29 RX ADMIN — BIMATOPROST 1 DROP: 0.1 SOLUTION/ DROPS OPHTHALMIC at 22:26

## 2020-06-29 NOTE — ED CARE HANDOFF
Emergency Department Sign Out Note        Sign out and transfer of care from Dr Leanna Du  See Separate Emergency Department note  The patient, Aly Gannon, was evaluated by the previous provider for dysuria  Workup Completed:  Labs    ED Course / Workup Pending (followup):  CT C/A/P with L-spine recon    -Pt with continued dysuria, despite taking Abx  Did speak with radiologist regarding CT's, he appears to have bladder wall thickening consistent with infections; this raises concern as he has been taking his ciprofloxacin regularly  Additionally radiology states pt has concern for infection at L1, this is a change from his previous CT in September of last year  Pt did have a laminectomy and facetectomy in June, this was prior to previous imaging  Will admit patient for treatment of UTI as ciprofloxacin has not helped  Will place on Zosyn and Vancomycin, these should cover both the UTI and the potential osteomyelitis  Pt does not meet criteria for sepsis  Procedures  MDM    Disposition  Final diagnoses:   Dysuria   Renal insufficiency   Elevated LFTs   Failure of outpatient treatment   Abnormal CT scan     Time reflects when diagnosis was documented in both MDM as applicable and the Disposition within this note     Time User Action Codes Description Comment    6/29/2020  3:53 PM Cristo Radha Add [R30 0] Dysuria     6/29/2020  3:54 PM Cristo Radha Add [N28 9] Renal insufficiency     6/29/2020  3:54 PM Cristo Radha Add [R79 89] Elevated LFTs     6/29/2020  6:59 PM Rosalia Branch E Add [Z78 9] Failure of outpatient treatment     6/29/2020  6:59 PM Rosalia Branch E Add [R93 89] Abnormal CT scan       ED Disposition     ED Disposition Condition Date/Time Comment    Admit Stable Mon Jun 29, 2020  6:59 PM Case was discussed with SLIM PA and the patient's admission status was agreed to be Admission Status: observation status to the service of Dr Delores Dia   Follow-up Information     Follow up With Specialties Details Why Heron Hernández MD Internal Medicine Schedule an appointment as soon as possible for a visit in 1 week  [address]  [facility] [address]      Your urologist at [facility]  Schedule an appointment as soon as possible for a visit in 1 week          Patient's Medications   Discharge Prescriptions    No medications on file     No discharge procedures on file         ED Provider  Electronically Signed by     Truong Mahan MD  06/30/20 [id number]

## 2020-06-29 NOTE — ED PROVIDER NOTES
History  Chief Complaint   Patient presents with    Urinary Retention     patient has a history of bladder cancer, reports a decrease in urinary output  recently had a BCG treatment  History provided by:  Patient   used: No    Medical Problem - Major   Location:  Complaining of urinary frequency, some dysuria with decreased urine output  History of bladder cancer gets BCG installation through [facility]  Severity:  Moderate  Onset quality:  Gradual  Timing:  Constant  Progression:  Unchanged  Chronicity:  New  Context:  Had a recent urinalysis and has been on Cipro which did not change the symptoms at all and the urine culture was negative although on the urine dipstick there were innumerable white cells and a numerable bacteria  Relieved by:  Nothing  Worsened by:  Urination  Ineffective treatments:  Cipro  Associated symptoms: abdominal pain    Associated symptoms: no chest pain, no congestion, no cough, no diarrhea, no fever, no headaches, no nausea, no shortness of breath and no vomiting        Prior to Admission Medications   Prescriptions Last Dose Informant Patient Reported? Taking? ALPRAZolam (XANAX) 0 25 mg tablet  Self No No   Sig: At twice a day as needed for anxiety   Azelastine HCl 0 15 % SOLN  Self No No   Sig: Inhale 1 spray 2 (two) times a day   Cholecalciferol 1 25 MG (04352 UT) capsule  Self No No   Sig: Take 1 capsule (50,000 Units total) by mouth every 30 (thirty) days   Insulin Pen Needle 31G X 8 MM MISC  Self No No   Sig: Inject 3 times a day   LANTUS SOLOSTAR 100 units/mL injection pen  Self No No   Si units qhs   LUMIGAN 0 01 % ophthalmic drops  Self Yes No   NOVOLOG FLEXPEN 100 units/mL SOPN   No No   Sig: 10 units with each meal   aspirin 81 MG tablet  Self Yes No   Sig: Take 81 mg by mouth daily  bismuth subsalicylate (PEPTO BISMOL) 262 MG chewable tablet  Self Yes No   Sig: Chew 524 mg daily as needed for indigestion     ciprofloxacin (CIPRO) 250 mg tablet   No No   Sig: Take one tablet daily for 1 week   clopidogrel (PLAVIX) 75 mg tablet  Self Yes No   Sig: Take 75 mg by mouth daily   fluocinonide (LIDEX) 0 05 % cream  Self Yes No   Sig: Apply topically 2 (two) times a day  fluticasone (FLONASE) 50 mcg/act nasal spray  Self No No   Sig: One spray in each nostril twice a day   furosemide (LASIX) 20 mg tablet   No No   Sig: Take 1 tablet every other day or as needed for edema - instruction change 6/24/20   gabapentin (NEURONTIN) 300 mg capsule  Self No No   Sig: TAKE 2 CAPSULES BY MOUTH EVERY DAY AT BEDTIME   Patient taking differently: daily TAKE 2 CAPSULES BY MOUTH EVERY DAY IN THE MORNING   isosorbide mononitrate (IMDUR) 30 mg 24 hr tablet  Self No No   Sig: TAKE 3 TABLETS BY MOUTH EVERY DAY   loperamide (IMODIUM) 2 mg capsule  Self Yes No   Sig: Take by mouth   loratadine (CLARITIN) 10 mg tablet   No No   Sig: Take 1 tablet (10 mg total) by mouth daily for 15 days   metoprolol succinate (TOPROL-XL) 100 mg 24 hr tablet  Self Yes No   Sig: Take 100 mg by mouth daily  multivitamin (THERAGRAN) TABS  Self Yes No   Sig: Take 1 tablet by mouth daily     omeprazole (PriLOSEC) 20 mg delayed release capsule  Self No No   Sig: Take 1 capsule (20 mg total) by mouth daily   sertraline (ZOLOFT) 50 mg tablet  Self No No   Sig: Take 1 tablet (50 mg total) by mouth daily      Facility-Administered Medications: None       Past Medical History:   Diagnosis Date    Anemia     Last assessed: 9/28/17    Arteriosclerotic cardiovascular disease     Last assessed: 9/28/17    [facility]     Cancer ([facility] )     bladder    Cardiac disease     Diabetes mellitus ([facility] )     Hypertension     Hypotension     Last assessed: 2/24/15    Insomnia     Last assessed: 11/14/12    Other seasonal allergic rhinitis     Last assessed: 2/10/16    Transient cerebral ischemia        Past Surgical History:   Procedure Laterality Date    CARDIAC SURGERY      Cath stent placement  Last assessed: 3/9/17  Interventional Catheterization    CHOLECYSTECTOMY      CYSTOSCOPY      Diagnostic w/biopsy  Tana Walters  Last assessed: 12/1/14    CYSTOURETHROSCOPY      w/cautery  Tana Walters    GASTRIC BYPASS      For morbid obesity w/Shaji-en-Y  Resolved: 11/17/09    INCISION AND DRAINAGE OF WOUND Right 2/26/2017    Procedure: INCISION AND DRAINAGE (I&D) EXTREMITY WITH APPLICATION OF GRAFT JACKET;  Surgeon: Oralia Majano DPM;  [facility];  Service:     INCISION AND DRAINAGE OF WOUND Right 4/25/2017    Procedure: INCISION AND DRAINAGE (I&D) EXTREMITY, APPLICATION OF GRAFT;  Surgeon: Oralia Majano DPM;  [facility];  Service:     JOINT REPLACEMENT      Knee  Last assessed: 1/28/11    MS CYSTOURETHROSCOPY,BIOPSY N/A 8/16/2016    Procedure: Olive Precise;  Surgeon: Cristobal Sin MD;  [facility];  Service: Urology    ROTATOR CUFF REPAIR      SMALL INTESTINE SURGERY      Surgery Shaji-en-Y    SPINAL FUSION      VAC DRESSING APPLICATION Right 9/92/2744    Procedure: APPLICATION VAC DRESSING;  Surgeon: Oralia Majnao DPM;  [facility];  Service:        Family History   Problem Relation Age of Onset    Diabetes Mother     Heart disease Mother     Other Mother         High blood pressure    Heart disease Father     Diabetes Sister     Other Sister         High blood pressure    Kidney disease Sister     Heart disease Brother     Other Brother         High blood pressure     I have reviewed and agree with the history as documented      E-Cigarette/Vaping    E-Cigarette Use Never User      E-Cigarette/Vaping Substances     Social History     Tobacco Use    Smoking status: Former Smoker    Smokeless tobacco: Never Used   Substance Use Topics    Alcohol use: Yes     Frequency: 2-3 times a week     Drinks per session: 10 or more    Drug use: No       Review of Systems   Constitutional: Negative for appetite change, chills and fever  HENT: Negative for congestion  Respiratory: Negative for cough, chest tightness and shortness of breath  Cardiovascular: Negative for chest pain  Gastrointestinal: Positive for abdominal pain  Negative for diarrhea, nausea and vomiting  Genitourinary: Positive for decreased urine volume, dysuria, flank pain and urgency  Negative for difficulty urinating  Musculoskeletal: Positive for back pain  Negative for gait problem  Neurological: Negative for weakness, numbness and headaches  All other systems reviewed and are negative  Physical Exam  Physical Exam   Constitutional: He appears well-developed and well-nourished  He is cooperative  Non-toxic appearance  He does not have a sickly appearance  He does not appear ill  No distress  HENT:   Head: Normocephalic and atraumatic  Right Ear: Hearing normal    Left Ear: Hearing normal    Mouth/Throat: Mucous membranes are normal    Eyes: Conjunctivae and lids are normal  Right eye exhibits no discharge  Left eye exhibits no discharge  Neck: Trachea normal and normal range of motion  Cardiovascular: Normal rate, regular rhythm, normal heart sounds, intact distal pulses and normal pulses  Exam reveals no gallop and no friction rub  No murmur heard  Pulmonary/Chest: Effort normal and breath sounds normal  No stridor  No respiratory distress  He has no wheezes  He has no rales  Abdominal: Soft  Normal appearance and bowel sounds are normal  He exhibits no distension  There is tenderness in the suprapubic area  There is no rebound, no guarding, no CVA tenderness, no tenderness at McBurney's point and negative Morgan's sign  Musculoskeletal: Normal range of motion  He exhibits no edema, tenderness or deformity  Neurological: He is alert  He has normal strength  No sensory deficit  He exhibits normal muscle tone  Gait normal  GCS eye subscore is 4  GCS verbal subscore is 5  GCS motor subscore is 6     Skin: Skin is warm, dry and intact  No rash noted  He is not diaphoretic  No pallor  Psychiatric: He has a normal mood and affect  His speech is normal  Cognition and memory are normal    Nursing note and vitals reviewed        Vital Signs  ED Triage Vitals   Temperature Pulse Respirations Blood Pressure SpO2   06/29/20 1333 06/29/20 1332 06/29/20 1332 06/29/20 1332 06/29/20 1332   97 9 °F (36 6 °C) 61 18 133/80 100 %      Temp Source Heart Rate Source Patient Position - Orthostatic VS BP Location FiO2 (%)   06/29/20 1333 06/29/20 1332 06/29/20 1332 06/29/20 1332 --   Oral Monitor Sitting Right arm       Pain Score       06/29/20 1332       5           Vitals:    06/29/20 1332 06/29/20 1540   BP: 133/80 124/75   Pulse: 61 76   Patient Position - Orthostatic VS: Sitting Lying         Visual Acuity      ED Medications  Medications - No data to display    Diagnostic Studies  Results Reviewed     Procedure Component Value Units Date/Time    Comprehensive metabolic panel [[id number]]  (Abnormal) Collected:  06/29/20 1504    Lab Status:  Final result Specimen:  Blood from Arm, Left Updated:  06/29/20 1549     Sodium 137 mmol/L      Potassium 4 7 mmol/L      Chloride 104 mmol/L      CO2 24 mmol/L      ANION GAP 9 mmol/L      BUN 42 mg/dL      Creatinine 2 62 mg/dL      Glucose 102 mg/dL      Calcium 8 6 mg/dL      AST 50 U/L      ALT 97 U/L      Alkaline Phosphatase 100 U/L      Total Protein 7 6 g/dL      Albumin 3 8 g/dL      Total Bilirubin 0 36 mg/dL      eGFR 26 ml/min/1 73sq m     Narrative:       Mi guidelines for Chronic Kidney Disease (CKD):     Stage 1 with normal or high GFR (GFR > 90 mL/min/1 73 square meters)    Stage 2 Mild CKD (GFR = 60-89 mL/min/1 73 square meters)    Stage 3A Moderate CKD (GFR = 45-59 mL/min/1 73 square meters)    Stage 3B Moderate CKD (GFR = 30-44 mL/min/1 73 square meters)    Stage 4 Severe CKD (GFR = 15-29 mL/min/1 73 square meters)    Stage 5 End Stage CKD (GFR <15 mL/min/1 73 square meters)  Note: GFR calculation is accurate only with a steady state creatinine    Lipase [[id number]]  (Normal) Collected:  06/29/20 1504    Lab Status:  Final result Specimen:  Blood from Arm, Left Updated:  06/29/20 1549     Lipase 309 u/L     Urine Microscopic [[id number]] Collected:  06/29/20 1507    Lab Status:   In process Specimen:  Urine, Clean Catch Updated:  06/29/20 1511    CBC and differential [[id number]]  (Abnormal) Collected:  06/29/20 1504    Lab Status:  Final result Specimen:  Blood from Arm, Left Updated:  06/29/20 1511     WBC 9 49 Thousand/uL      RBC 3 84 Million/uL      Hemoglobin 12 1 g/dL      Hematocrit 36 3 %      MCV 95 fL      MCH 31 5 pg      MCHC 33 3 g/dL      RDW 13 2 %      MPV 10 2 fL      Platelets 158 Thousands/uL      nRBC 0 /100 WBCs      Neutrophils Relative 61 %      Immat GRANS % 1 %      Lymphocytes Relative 28 %      Monocytes Relative 7 %      Eosinophils Relative 2 %      Basophils Relative 1 %      Neutrophils Absolute 5 87 Thousands/µL      Immature Grans Absolute 0 05 Thousand/uL      Lymphocytes Absolute 2 62 Thousands/µL      Monocytes Absolute 0 68 Thousand/µL      Eosinophils Absolute 0 22 Thousand/µL      Basophils Absolute 0 05 Thousands/µL     POCT urinalysis dipstick [[id number]]  (Normal) Resulted:  06/29/20 1511    Lab Status:  Final result Specimen:  Urine Updated:  06/29/20 1511     Color, UA yellow    Urine Macroscopic, POC [[id number]]  (Abnormal) Collected:  06/29/20 1507    Lab Status:  Final result Specimen:  Urine Updated:  06/29/20 1508     Color, UA Yellow     Clarity, UA Clear     pH, UA 5 5     Leukocytes, UA Moderate     Nitrite, UA Negative     Protein, UA >=300 mg/dl      Glucose, UA Negative mg/dl      Ketones, UA Negative mg/dl      Urobilinogen, UA 0 2 E U /dl      Bilirubin, UA Negative     Blood, UA Large     Specific Gravity, UA 1 025    Narrative:       CLINITEK RESULT                 CT chest abdomen pelvis wo contrast (Results Pending)   CT recon only lumbar spine (No Charge)    (Results Pending)              Procedures  Procedures         ED Course       US AUDIT      Most Recent Value   Initial Alcohol Screen: US AUDIT-C    1  How often do you have a drink containing alcohol? 3 Filed at: 06/29/2020 2692   2  How many drinks containing alcohol do you have on a typical day you are drinking? 2 Filed at: 06/29/2020 1333   Audit-C Score  5 Filed at: 06/29/2020 1333                  MARIELLE/DAST-10      Most Recent Value   How many times in the past year have you    Used an illegal drug or used a prescription medication for non-medical reasons? Never Filed at: 06/29/2020 1334                                MDM  Number of Diagnoses or Management Options  Diagnosis management comments: I reviewed the urine culture from the primary care doctor's office which was negative  He does have a history of renal insufficiency will so will do the CT scan without IV contrast   He had a CT of the chest ordered as an outpatient to be done tomorrow for an abnormal x-ray which we can get done today and also a lumbar spine CT for back pain and given his history of cancer will also checked out since he is also complaining of back pain    His white blood cell count is normal         Disposition  Final diagnoses:   Dysuria   Renal insufficiency   Elevated LFTs     Time reflects when diagnosis was documented in both MDM as applicable and the Disposition within this note     Time User Action Codes Description Comment    6/29/2020  3:53 PM Vlad Sheth [R30 0] Dysuria     6/29/2020  3:54 PM Trudy Mercer [N28 9] Renal insufficiency     6/29/2020  3:54 PM Vlad Abebe Add [R79 89] Elevated LFTs       ED Disposition     None      Follow-up Information     Follow up With Specialties Details Why Heron Hernández MD Internal Medicine Schedule an appointment as soon as possible for a visit in 1 week  [address] [facility]  [address]  [place]  [address]  [phone]      Your urologist at [facility] kim  Schedule an appointment as soon as possible for a visit in 1 week            Patient's Medications   Discharge Prescriptions    No medications on file     No discharge procedures on file      PDMP Review       Value Time User    PDMP Reviewed  Yes 10/2/2019  4:06 PM Dio Ansari MD          ED Provider  Electronically Signed by           Valentin Finch MD  06/30/20 8617

## 2020-06-30 ENCOUNTER — APPOINTMENT (INPATIENT)
Dept: MRI IMAGING | Facility: HOSPITAL | Age: 75
DRG: 629 | End: 2020-06-30
Payer: MEDICARE

## 2020-06-30 ENCOUNTER — TELEPHONE (OUTPATIENT)
Dept: ADMINISTRATIVE | Facility: OTHER | Age: 75
End: 2020-06-30

## 2020-06-30 LAB
ALBUMIN SERPL BCP-MCNC: 3.7 G/DL (ref 3.5–5)
ALP SERPL-CCNC: 102 U/L (ref 46–116)
ALT SERPL W P-5'-P-CCNC: 100 U/L (ref 12–78)
ANION GAP SERPL CALCULATED.3IONS-SCNC: 13 MMOL/L (ref 4–13)
AST SERPL W P-5'-P-CCNC: 61 U/L (ref 5–45)
BACTERIA UR CULT: NORMAL
BASOPHILS # BLD AUTO: 0.04 THOUSANDS/ΜL (ref 0–0.1)
BASOPHILS NFR BLD AUTO: 0 % (ref 0–1)
BILIRUB SERPL-MCNC: 0.38 MG/DL (ref 0.2–1)
BUN SERPL-MCNC: 41 MG/DL (ref 5–25)
CALCIUM SERPL-MCNC: 9 MG/DL (ref 8.3–10.1)
CHLORIDE SERPL-SCNC: 105 MMOL/L (ref 100–108)
CO2 SERPL-SCNC: 19 MMOL/L (ref 21–32)
CREAT SERPL-MCNC: 2.56 MG/DL (ref 0.6–1.3)
EOSINOPHIL # BLD AUTO: 0.21 THOUSAND/ΜL (ref 0–0.61)
EOSINOPHIL NFR BLD AUTO: 2 % (ref 0–6)
ERYTHROCYTE [DISTWIDTH] IN BLOOD BY AUTOMATED COUNT: 13.1 % (ref 11.6–15.1)
GFR SERPL CREATININE-BSD FRML MDRD: 27 ML/MIN/1.73SQ M
GLUCOSE SERPL-MCNC: 104 MG/DL (ref 65–140)
GLUCOSE SERPL-MCNC: 116 MG/DL (ref 65–140)
GLUCOSE SERPL-MCNC: 116 MG/DL (ref 65–140)
GLUCOSE SERPL-MCNC: 146 MG/DL (ref 65–140)
GLUCOSE SERPL-MCNC: 197 MG/DL (ref 65–140)
GLUCOSE SERPL-MCNC: 289 MG/DL (ref 65–140)
HCT VFR BLD AUTO: 37.2 % (ref 36.5–49.3)
HGB BLD-MCNC: 12.5 G/DL (ref 12–17)
IMM GRANULOCYTES # BLD AUTO: 0.05 THOUSAND/UL (ref 0–0.2)
IMM GRANULOCYTES NFR BLD AUTO: 1 % (ref 0–2)
LYMPHOCYTES # BLD AUTO: 1.25 THOUSANDS/ΜL (ref 0.6–4.47)
LYMPHOCYTES NFR BLD AUTO: 12 % (ref 14–44)
MCH RBC QN AUTO: 31.6 PG (ref 26.8–34.3)
MCHC RBC AUTO-ENTMCNC: 33.6 G/DL (ref 31.4–37.4)
MCV RBC AUTO: 94 FL (ref 82–98)
MONOCYTES # BLD AUTO: 0.71 THOUSAND/ΜL (ref 0.17–1.22)
MONOCYTES NFR BLD AUTO: 7 % (ref 4–12)
NEUTROPHILS # BLD AUTO: 7.93 THOUSANDS/ΜL (ref 1.85–7.62)
NEUTS SEG NFR BLD AUTO: 78 % (ref 43–75)
NRBC BLD AUTO-RTO: 0 /100 WBCS
PLATELET # BLD AUTO: 180 THOUSANDS/UL (ref 149–390)
PMV BLD AUTO: 10.4 FL (ref 8.9–12.7)
POTASSIUM SERPL-SCNC: 4.5 MMOL/L (ref 3.5–5.3)
PROT SERPL-MCNC: 7.8 G/DL (ref 6.4–8.2)
RBC # BLD AUTO: 3.96 MILLION/UL (ref 3.88–5.62)
SODIUM SERPL-SCNC: 137 MMOL/L (ref 136–145)
WBC # BLD AUTO: 10.19 THOUSAND/UL (ref 4.31–10.16)

## 2020-06-30 PROCEDURE — 97166 OT EVAL MOD COMPLEX 45 MIN: CPT

## 2020-06-30 PROCEDURE — 80053 COMPREHEN METABOLIC PANEL: CPT | Performed by: PHYSICIAN ASSISTANT

## 2020-06-30 PROCEDURE — 97163 PT EVAL HIGH COMPLEX 45 MIN: CPT

## 2020-06-30 PROCEDURE — 82948 REAGENT STRIP/BLOOD GLUCOSE: CPT

## 2020-06-30 PROCEDURE — 99255 IP/OBS CONSLTJ NEW/EST HI 80: CPT | Performed by: INTERNAL MEDICINE

## 2020-06-30 PROCEDURE — 99232 SBSQ HOSP IP/OBS MODERATE 35: CPT | Performed by: INTERNAL MEDICINE

## 2020-06-30 PROCEDURE — 85025 COMPLETE CBC W/AUTO DIFF WBC: CPT | Performed by: PHYSICIAN ASSISTANT

## 2020-06-30 PROCEDURE — 72148 MRI LUMBAR SPINE W/O DYE: CPT

## 2020-06-30 PROCEDURE — 87493 C DIFF AMPLIFIED PROBE: CPT | Performed by: NURSE PRACTITIONER

## 2020-06-30 RX ADMIN — SERTRALINE HYDROCHLORIDE 50 MG: 50 TABLET ORAL at 09:18

## 2020-06-30 RX ADMIN — DOXYLAMINE SUCCINATE 25 MG: 25 TABLET ORAL at 01:57

## 2020-06-30 RX ADMIN — HEPARIN SODIUM 5000 UNITS: 5000 INJECTION INTRAVENOUS; SUBCUTANEOUS at 21:54

## 2020-06-30 RX ADMIN — METOPROLOL SUCCINATE 100 MG: 50 TABLET, EXTENDED RELEASE ORAL at 09:16

## 2020-06-30 RX ADMIN — ASPIRIN 81 MG 81 MG: 81 TABLET ORAL at 09:17

## 2020-06-30 RX ADMIN — PANTOPRAZOLE SODIUM 40 MG: 40 TABLET, DELAYED RELEASE ORAL at 06:32

## 2020-06-30 RX ADMIN — HEPARIN SODIUM 5000 UNITS: 5000 INJECTION INTRAVENOUS; SUBCUTANEOUS at 14:36

## 2020-06-30 RX ADMIN — PIPERACILLIN SODIUM AND TAZOBACTAM SODIUM 3.38 G: 36; 4.5 INJECTION, POWDER, FOR SOLUTION INTRAVENOUS at 01:00

## 2020-06-30 RX ADMIN — HEPARIN SODIUM 5000 UNITS: 5000 INJECTION INTRAVENOUS; SUBCUTANEOUS at 06:32

## 2020-06-30 RX ADMIN — PIPERACILLIN SODIUM AND TAZOBACTAM SODIUM 3.38 G: 36; 4.5 INJECTION, POWDER, FOR SOLUTION INTRAVENOUS at 06:33

## 2020-06-30 RX ADMIN — INSULIN GLARGINE 30 UNITS: 100 INJECTION, SOLUTION SUBCUTANEOUS at 21:54

## 2020-06-30 RX ADMIN — INSULIN LISPRO 10 UNITS: 100 INJECTION, SOLUTION INTRAVENOUS; SUBCUTANEOUS at 16:48

## 2020-06-30 RX ADMIN — GABAPENTIN 600 MG: 300 CAPSULE ORAL at 09:15

## 2020-06-30 RX ADMIN — INSULIN LISPRO 10 UNITS: 100 INJECTION, SOLUTION INTRAVENOUS; SUBCUTANEOUS at 09:20

## 2020-06-30 RX ADMIN — INSULIN LISPRO 2 UNITS: 100 INJECTION, SOLUTION INTRAVENOUS; SUBCUTANEOUS at 13:34

## 2020-06-30 RX ADMIN — INSULIN LISPRO 10 UNITS: 100 INJECTION, SOLUTION INTRAVENOUS; SUBCUTANEOUS at 13:33

## 2020-06-30 RX ADMIN — ACETAMINOPHEN 650 MG: 325 TABLET ORAL at 15:01

## 2020-06-30 RX ADMIN — ISOSORBIDE MONONITRATE 90 MG: 60 TABLET, EXTENDED RELEASE ORAL at 09:18

## 2020-06-30 RX ADMIN — BIMATOPROST 1 DROP: 0.1 SOLUTION/ DROPS OPHTHALMIC at 21:54

## 2020-06-30 NOTE — ASSESSMENT & PLAN NOTE
· MRI pending for further evaluation  · ID recommendations reviewed  · Suspicion for infection is lower now

## 2020-06-30 NOTE — PHYSICAL THERAPY NOTE
PHYSICAL THERAPY EVALUATION          Patient Name: Aly Gannon  ZSJWE'T Date: 6/30/2020   PT EVALUATION    66 y o     [id number]    Dysuria [R30 0]  Renal insufficiency [N28 9]  Abnormal CT scan [R93 89]  Elevated LFTs [R79 89]  Failure of outpatient treatment [Z78 9]    Past Medical History:   Diagnosis Date    Anemia     Last assessed: 9/28/17    Arteriosclerotic cardiovascular disease     Last assessed: 9/28/17    [facility] ([facility] )     Cancer ([facility] )     bladder    Cardiac disease     Diabetes mellitus ([place] [address] )     Hypertension     Hypotension     Last assessed: 2/24/15    Insomnia     Last assessed: 11/14/12    Other seasonal allergic rhinitis     Last assessed: 2/10/16    Transient cerebral ischemia      Past Surgical History:   Procedure Laterality Date    CARDIAC SURGERY      Cath stent placement  Last assessed: 3/9/17  Interventional Catheterization    CHOLECYSTECTOMY      CYSTOSCOPY      Diagnostic w/biopsy  Jazmin Grajeda  Last assessed: 12/1/14    CYSTOURETHROSCOPY      w/cautery  Jazmin Grajeda    GASTRIC BYPASS      For morbid obesity w/Shaji-en-Y  Resolved: 11/17/09    INCISION AND DRAINAGE OF WOUND Right 2/26/2017    Procedure: INCISION AND DRAINAGE (I&D) EXTREMITY WITH APPLICATION OF GRAFT JACKET;  Surgeon: Stoney Hall DPM;  [facility];  Service:     INCISION AND DRAINAGE OF WOUND Right 4/25/2017    Procedure: INCISION AND DRAINAGE (I&D) EXTREMITY, APPLICATION OF GRAFT;  Surgeon: Stoney Hall DPM;  [facility];  Service:     JOINT REPLACEMENT      Knee   Last assessed: 1/28/11    [place] CYSTOURETHROSCOPY,BIOPSY N/A 8/16/2016    Procedure: Sim Harada;  Surgeon: Kelli Bonner MD;  [facility];  Service: Urology    ROTATOR CUFF REPAIR      SMALL INTESTINE SURGERY      Surgery Shaji-en-Y    SPINAL FUSION      VAC DRESSING APPLICATION Right 7/77/2291    Procedure: APPLICATION VAC DRESSING;  Surgeon: Ashanti Tucker DPM;  [facility];  Service:         06/30/20 1059   Note Type   Note type Eval only   Pain Assessment   Pain Assessment Tool Pain Assessment not indicated - pt denies pain   Pain Score No Pain   Pain Location/Orientation Orientation: Lower;Orientation: Mid;Location: Back   Effect of Pain on Daily Activities pain w amb rated 7/10   Hospital Pain Intervention(s) Repositioned; Ambulation/increased activity; Rest   Home Living   Type of [address] Two level;Bed/bath upstairs;Stairs to enter with rails   Bathroom Shower/Tub Walk-in shower   [address] [place] [address]  chair   Home Equipment Walker;Cane;Stair glide   Additional Comments 2+4 ADILENE w HR  FOS to second story bedroom but does have stair glide (was not using)  full bathroom on main level as well   Prior Function   Level of Leon Independent with ADLs and functional mobility   Lives With Spouse; Other (Comment)  (ANNITA)   ADL Assistance Independent   Vocational   (pt report working light duty/ minimal lifting)   Comments pta pt reports being indep, amb w SPC prn for LBP  also wears back brace (described like LSO) which he wears prn for pain relief  +  Restrictions/Precautions   Weight Bearing Precautions Per Order No   Other Precautions Multiple lines; Fall Risk;Pain;Hard of hearing   General   Additional Pertinent History pt admitted 6/29/20 for UTI  hx of L/s laminectomy June 2019  cont to experience centralized LBP, occ difficulties ambulating as a result  pending MRI L/s for possible infection   up and OOB orders   Family/Caregiver Present Yes  (wife toward end of session)   Cognition   Overall Cognitive Status WFL   Arousal/Participation Cooperative   Orientation Level Oriented X4   Memory Within functional limits   Following Commands Follows one step commands without difficulty   RLE Assessment   RLE Assessment X   Strength RLE   R Hip Flexion 4+/5   R Hip ABduction 4/5   R Hip ADduction 4/5   R Knee Extension 4+/5   R Ankle Dorsiflexion 4+/5   LLE Assessment   LLE Assessment X   Strength LLE   L Hip Flexion 4+/5   L Hip ABduction 4/5   L Hip ADduction 4/5   L Knee Extension 4+/5   L Ankle Dorsiflexion 4+/5   Coordination   Movements are Fluid and Coordinated 1   Sensation X  (neuropathy of bl feet)   Light Touch   RLE Light Touch Grossly intact   RLE Light Touch Comments except absent L5   LLE Light Touch Grossly intact   LLE Light Touch Comments except absent L5   Bed Mobility   Additional Comments oob pre and post session   Transfers   Sit to Stand 5  Supervision   Additional items Armrests; Increased time required   Stand to Sit 5  Supervision   Additional items Armrests; Increased time required;Verbal cues   Ambulation/Elevation   Gait pattern Forward Flexion; Wide ROSALIA; Excessively slow; Antalgic   Gait Assistance 5  Supervision   Additional items Assist x 1;Verbal cues   Assistive Device None   Distance 60'  (limited 2* to pain)   Stair Management Assistance Not tested  (decreased endurance 2* to pain)   Balance   Static Standing Fair   Dynamic Standing Fair -   Ambulatory Fair -   Endurance Deficit   Endurance Deficit Yes   Endurance Deficit Description pain   Activity Tolerance   Activity Tolerance Patient limited by pain   Medical Staff [facility] cleared for therapy   Assessment   Prognosis Fair   Problem List Decreased strength;Decreased endurance;Decreased mobility; Impaired sensation;Pain   Assessment Francisco Zamora is a 66 y o  male admitted to [facility] on 6/29/2020 for Urinary tract infection  Pending MRI L/s  Pt  has a past medical history of Anemia, Arteriosclerotic cardiovascular disease, Bladder cancer ([facility] ), Cancer ([facility] ), Cardiac disease, Diabetes mellitus ([facility] ), Hypertension, Hypotension, Insomnia, Other seasonal allergic rhinitis, and Transient cerebral ischemia    PT was consulted and pt was seen on 6/30/2020 for mobility assessment and d/c planning  Pt presents low fall risk, PIV  Pt is currently functioning at a supervision assistance x1 level for transfers, supervision assistance x1 level for ambulation with no assistive device  Pt denies sx at rest/seated  Pt demonstrated decreased ambulatory endurance secondary to onset of LBP w standing/ambulation  Denies issues w ambulation just states he needs to use SPC sometimes for pain relief/support  Educated pt on use of RW for pain relief prn given increased support  Pt verbalize understanding and also verbalize understanding of safety/fall risk precautions  Pt will benefit from continued skilled IP PT to address the above mentioned impairments  in order to maximize recovery and increase functional independence when completing mobility and ADLs  Currently PT recommendations for DME include continue use of RW vs SPC prn  At this time PT recommendations for d/c are home w family support, OPPT  Pt may benefit from Niyah Sullivan's comprehensive spine program pending MRI results (to determine if contributing to LBP)  End of session pt seated in bedside recliner, all needs in reach  Wife present  Barriers to Discharge Inaccessible home environment   Barriers to Discharge Comments steps   Goals   Patient Goals to decrease pain   STG Expiration Date 07/10/20   Short Term Goal #1 1)  Pt will perform bed mobility with Rena demonstrating appropriate technique 100% of the time in order to improve function  2)  Perform all transfers with Rena demonstrating safe and appropriate technique 100% of the time in order to improve ability to negotiate safely in home environment  3) Amb with least restrictive AD > 250'x1 with mod I in order to demonstrate ability to negotiate in home environment  4)  Improve overall strength and balance 1/2 grade in order to optimize ability to perform functional tasks and reduce fall risk  5) Increase activity tolerance to 45 minutes in order to improve endurance to functional tasks  6)  Negotiate stairs using most appropriate technique and mod I in order to be able to negotiate safely in home environment  7) PT for ongoing patient and family/caregiver education, DME needs and d/c planning in order to promote highest level of function in least restrictive environment  PT Treatment Day 0   Plan   Treatment/Interventions Functional transfer training;LE strengthening/ROM; Elevations; Therapeutic exercise; Endurance training;Patient/family training;Equipment eval/education; Bed mobility;Gait training; Compensatory technique education;OT;Continued evaluation; Family   PT Frequency 2-3x/wk   Recommendation   PT Discharge Recommendation Return to previous environment with social support;Home with skilled therapy  (OPPT- may benefit from comp spine program pend MRI)   PT - OK to Discharge Yes   Additional Comments to home when medically stable   Modified Garrard Scale   Modified Garrard Scale 4   Barthel Index   Feeding 10   Bathing 0   Grooming Score 5   Dressing Score 10   Bladder Score 10   Bowels Score 10   Toilet Use Score 10   Transfers (Bed/Chair) Score 15   Mobility (Level Surface) Score 0   Stairs Score 0   Barthel Index Score 70   History: co - morbidities, age, social background (steps), past experience (sx hx L/s laminectomy 2019), fall risk, use of assistive device prn, multiple lines  Exam: impairments in systems including musculoskeletal (ROM, strength, posture-flexed), neuromuscular (balance, gait, transfers, motor function and sensation), joint integrity (s/p L/s laminectomy 06/19, pending MRI for L/s infection), integumentary (skin integrity, presence of TKA scar- healed), cognition  Clinical: unstable/unpredictable; pending MRI L/s  Complexity:high      Dustin Thornton, PT

## 2020-06-30 NOTE — OCCUPATIONAL THERAPY NOTE
Occupational Therapy Evaluation     Patient Name: Bertrand Rodriguez  SAECG'C Date: 6/30/2020  Problem List  Principal Problem:    Urinary tract infection  Active Problems:    Presence of stent in coronary artery    Malignant tumor of urinary bladder (HCC)    Chronic kidney disease, stage 4 (severe) (HCC)    Hypertension    Transaminitis    Abnormal CT scan, lumbar spine    Past Medical History  Past Medical History:   Diagnosis Date    Anemia     Last assessed: 9/28/17    Arteriosclerotic cardiovascular disease     Last assessed: 9/28/17    [facility] ([facility] )     Cancer ([facility] )     bladder    Cardiac disease     Diabetes mellitus ([facility] )     Hypertension     Hypotension     Last assessed: 2/24/15    Insomnia     Last assessed: 11/14/12    Other seasonal allergic rhinitis     Last assessed: 2/10/16    Transient cerebral ischemia      Past Surgical History  Past Surgical History:   Procedure Laterality Date    CARDIAC SURGERY      Cath stent placement  Last assessed: 3/9/17  Interventional Catheterization    CHOLECYSTECTOMY      CYSTOSCOPY      Diagnostic w/biopsy  Feliz Chapman  Last assessed: 12/1/14    CYSTOURETHROSCOPY      w/cautery  Feliz Chapman    GASTRIC BYPASS      For morbid obesity w/Shaji-en-Y  Resolved: 11/17/09    INCISION AND DRAINAGE OF WOUND Right 2/26/2017    Procedure: INCISION AND DRAINAGE (I&D) EXTREMITY WITH APPLICATION OF GRAFT JACKET;  Surgeon: Kimmy Jaimes DPM;  [facility];  Service:     INCISION AND DRAINAGE OF WOUND Right 4/25/2017    Procedure: INCISION AND DRAINAGE (I&D) EXTREMITY, APPLICATION OF GRAFT;  Surgeon: Kimmy Jaimes DPM;  [facility];  Service:     JOINT REPLACEMENT      Knee   Last assessed: 1/28/11    IA CYSTOURETHROSCOPY,BIOPSY N/A 8/16/2016    Procedure: Nazario Sessions;  Surgeon: Rosalba Jaime MD;  [facility];  Service: Urology    [address]      Surgery Shaji-en-Y    SPINAL FUSION      VAC DRESSING APPLICATION Right 6/11/1230    Procedure: APPLICATION VAC DRESSING;  Surgeon: Colman Olszewski, DPM;  [facility];  Service:            06/30/20 1057   Note Type   Note type Eval only   Restrictions/Precautions   Weight Bearing Precautions Per Order No   Braces or Orthoses Other (Comment)  (reports wears back brace (? LSO) PRN for pain)   Other Precautions Multiple lines; Fall Risk;Pain;Hard of hearing   Pain Assessment   Pain Assessment Tool Pain Assessment not indicated - pt denies pain   Pain Score No Pain   Pain Location/Orientation Orientation: Lower;Orientation: Mid;Location: Back   Effect of Pain on Daily Activities 0/10 pain at rest; 7/10 with activity   Hospital Pain Intervention(s) Repositioned; Ambulation/increased activity; Emotional support; Rest   Multiple Pain Sites No   Home Living   Type of Home House   Home Layout Two level;Bed/bath upstairs;Stairs to enter with rails  (2+4 ADILENE; Stair glide to 2nd floor)   Bathroom Shower/Tub Walk-in shower   Bathroom Toilet Standard   Bathroom Equipment Shower chair   Bathroom Accessibility Accessible   Home Equipment Walker;Cane;Stair glide   Additional Comments Pt lives with spouse and sister in law in a two level house with 4+6 ADILENE and stair glide to 2nd level  Prior Function   Level of [place] Independent with ADLs and functional mobility   Lives With Spouse; Other (Comment)  (sister in law)   Receives Help From Family   ADL Assistance Independent   IADLs Independent   Falls in the last 6 months 0  (pt denies)   Vocational Other (Comment)  (pt report working light duty/minimal lifting )   Comments At baseline, pt was I w/ ADLs, IADLs, and functional mobility/transfers w/ use of SPC PRN for pain, (+) , and denies falls PTA   Pt reports wearing back brace (described as LSO) PRN for pain   Lifestyle   Autonomy At baseline, pt was I w/ ADLs, IADLs, and functional mobility/transfers w/ use of SPC PRN for pain, (+) , and denies falls PTA  Pt reports wearing back brace (described as LSO) PRN for pain   Reciprocal Relationships Spouse, sister in law   Service to Others Retired- reports does light duty work PRN   Intrinsic Gratification Golfing    Psychosocial   Psychosocial ([address]) WDL   ADL   Where Assessed Chair   Eating Assistance 7  Independent   Grooming Assistance 7  Independent   UB Bathing Assistance 6  Modified Independent   LB Bathing Assistance 6  Modified Independent   [address] 6  Modified independent   [address] 6  Modified independent   [address]  6  Modified independent   [address] 6  Modified independent   Additional Comments Pt denies concerns regarding completing ADLs; Able to don/doff B/L socks at a Mod I level while seated; Reports has been performing ADL tasks independently in room    Bed Mobility   Supine to Sit Unable to assess   Sit to Supine Unable to assess   Additional Comments Pt seated OOB in chair at start/end of session  Call bell and phone within reach  All needs met and pt reports no further questions for OT at this time   Transfers   Sit to Stand 5  Supervision   Additional items Armrests; Increased time required   Stand to Sit 5  Supervision   Additional items Armrests   Functional Mobility   Functional Mobility 5  Supervision   Additional Comments Assist x1 w/o use of AD; Distance limited 2* increased pain in back (7/10) w/ activity   Balance   Static Sitting Good   Dynamic Sitting Fair +   Static Standing Fair   Dynamic Standing Fair -   Ambulatory Fair -   Activity Tolerance   Activity Tolerance Patient limited by pain; Patient tolerated treatment well   Medical Staff [facility], PT   Nurse Made Aware yes; Rafia, RN   RUE Assessment   RUE Assessment WFL   RUE Strength   RUE Overall Strength Within Functional Limits - able to perform ADL tasks with strength  (4/5 throughout)   LUE Assessment   LUE Assessment WFL   LUE Strength   LUE Overall Strength Within Functional Limits - able to perform ADL tasks with strength  (4/5 throughout)   Hand Function   Gross Motor Coordination Functional   Fine Motor Coordination Functional   Sensation   Light Touch Partial deficits in the LUE   Proprioception   Proprioception No apparent deficits   Vision - Complex Assessment   Ocular Range of Motion WFL   Head Position WDL   Acuity Able to read clock/calendar on wall without difficulty   Perception   Inattention/Neglect Appears intact   Cognition   Overall Cognitive Status [facility]   Arousal/Participation Alert; Cooperative   Attention Within functional limits   Orientation Level Oriented X4   Memory Within functional limits   Following Commands Follows one step commands without difficulty   Comments Pt pleasant and cooperative; Engages in conversation appropriately   Assessment   Prognosis Good   Assessment Pt is a 66 y o  male seen for OT evaluation s/p adm to [address] [facility] [address] on 6/29/2020 w/ urinary retention and dx'd w/ Urinary tract infection   CT lumbar spine demonstrated "Interval development of endplate irregularity at L1-L2 with loss of vacuum disc phenomena at this level  Findings raise the possibility for infection superimposed on the background of previously seen degenerative disc disease " Comorbidities affecting pts functional performance include a significant PMH of Anemia, Bladder CA, Cardiac disease, DM, HTN, Hypotension, and transient cerebral ischemia  Pt with active OT orders and activity orders for Up and OOB as tolerated   Pt lives with spouse and sister in law in a two level house with 4+6 ADILENE and stair glide to 2nd level  At baseline, pt was I w/ ADLs, IADLs, and functional mobility/transfers w/ use of SPC PRN for pain, (+) , and denies falls PTA  Pt reports wearing back brace (described as LSO) PRN for pain   Upon evaluation, pt currently functioning at a Mod I level for overall ADLs and Supervision for functional mobility/transfers 2* the following deficits impacting occupational performance: decreased balance, decreased tolerance and increased pain  These impairments, however do not limit pts ability to safely engage in all baseline areas of occupation, as pt is functioning near baseline level for ADLs and reports no concerns regarding returning home and completing ADLs  Pt reports has been performing ADLs independently in room  Pt scored overall 75/100 on the Barthel Index  Based on the aforementioned OT evaluation, functional performance deficits, and assessments, pt has been identified as a Moderate complexity evaluation  No further acute OT needs identified at this time  Recommend continued mobilization with hospital staff while in the hospital to increase pts endurance and strength upon D/C  From OT standpoint, recommend D/C home with family support when medically cleared  D/C pt from OT caseload at this time      Goals   Patient Goals To decrease pain   Plan   OT Frequency Eval only  (D/C OT)   Recommendation   OT Discharge Recommendation Return to previous environment with social support   OT - OK to Discharge Yes  (when medically cleared)   Barthel Index   Feeding 10   Bathing 5   Grooming Score 5   Dressing Score 10   Bladder Score 10   Bowels Score 10   Toilet Use Score 10   Transfers (Bed/Chair) Score 15   Mobility (Level Surface) Score 0   Stairs Score 0   Barthel Index Score 75   Modified [place] Scale   Modified [place] Scale 3        Hannah Atkins, OTR/L

## 2020-06-30 NOTE — CONSULTS
Consultation - Infectious Disease   Jaida Prophet 66 y o  male MRN: [id number]  Unit/Bed#: E5 -01 Encounter: [id number]    IMPRESSION & RECOMMENDATIONS:   1  Abnormal lumbar spine CT  I personally reviewed patient's CT of the lumbar spine which showed endplate irregularity from L1-L2  Suspect this is same finding as on his previous MRI from January 2020 which showed endplate edema of the M1-J6 after his previous laminectomy  He will complete an MRI later today for additional assessment  If changes do appear acute they could represent infection but are more likely representative of additional degenerative changes  Also consider malignancy given his ongoing bladder cancer  Patient is afebrile without leukocytosis  He is neurologically intact  He has been receiving broad-spectrum antibiotic coverage which I recommend stopping at this time  Will monitor patient off additional antibiotics while we wait for MRI imaging   -stop IV Zosyn  -stop IV vancomycin  -monitor patient off additional antibiotics  -monitor CBC and BMP  -follow-up MRI of the lumbar spine  -monitor back pain    2  Dysuria  Patient reports his symptoms occurred after receiving his BCG injection last week  He describes this is a recurring problem with each BCG/cystoscopy procedure that he goes through  BCG injections are known to cause bladder pain and spasms  He has had recurring urine cultures which are negative but has repeatedly received antibiotic treatment despite these findings  I recommend the patient have conversations with his urology team at [facility] regarding his recurring symptoms and alternative ways for management as repeated antibiotic treatment puts patient at risk for resistant organisms and renal toxicities  Patient is afebrile without leukocytosis  Urine and blood cultures are pending  I recommend stopping antibiotic treatment at this time while we wait for culture updates      -monitor patient off additional antibiotics  -monitor CBC and BMP  -follow-up blood cultures  -follow-up urine culture  -monitor  symptoms  -monitor urine output  -ongoing follow-up as an outpatient with Yemi Alvarado    3  CKD stage 4  Upon review of patient's available past medical records it appears his baseline creatinine is generally 2 2-2 5  Patient's creatinine was 2 62 upon admission  -monitor BMP  -avoid nephrotoxins    4  Bladder cancer  He is currently in care at [facility]  Was initially diagnosed in 12 to [facility]  He underwent several TURBT and a few rounds of BCG  He followed up with annual endoscopic surveillance which had been negative until new findings were noted in July 2016  He then underwent additioanl TURBT and has had ongoing BCG+INF treatments since   -continue outpatient follow-up with [facility] after discharge    Thank you for the consult  We will continue to follow along  Above plan was discussed in detail with patient at the bedside  Above plan was discussed in detail with [facility] attending, Dr Geoffrey Benitez  HISTORY OF PRESENT ILLNESS:  Reason for Consult:  Abnormal CT scan, lumbar spine  HPI: Pawan Mallory is a 66y o  year old male who presented to the [facility] Emergency Department on 06/29/2020 with urinary retention  Patient reported a history of bladder cancer and stated he gets BCG bladder injections through [facility]  He noted urinary symptoms of burning, hesitancy, urgency, and frequency  He also noted less urine output than normal  He has also been having back pain which has been keeping him awake at night  He reported that he told his team at [facility] about the pain and they wanted to work him up with MRI  Upon arrival to the ED the patient's temperature was 97 9° with a heart rate of 61  His respiratory rate was 18  His O2 saturation was 100% on room air  Patient's white blood cell count was 9 49    His creatinine was 2 62 with a GFR of 26  LFTs were mildly elevated  Lipase was normal   Urinalysis showed moderate leukocytes, positive protein, large blood, 30 of 50 WBC, innumerable WBC, and occasional bacteria  The urine was sent for culture  The patient was taken for CT which showed urinary bladder wall thickening and enlarged prostate  He was started on ceftriaxone  He was admitted for additional medical management  After his admission the patient's antibiotic regimen was transitioned to IV Zosyn  Given his back pain he was sent for CT of lumbar spine which showed irregularities at L1 to L2  His antibiotic regimen was broadened by adding vancomycin  Patient's urine and blood cultures are pending  He remains afebrile  He does not meet sepsis criteria  We have been asked for formal consult for abnormal CT scan lumbar spine  The patient has type 2 diabetes mellitus, cardiac disease, allergic rhinitis, insomnia, anemia, hypertension, headaches, arteriosclerotic cardiovascular disease, and bladder cancer  He is currently undergoing chemotherapy with [facility]  His last chemo was on 06/23/2020  He has a history of cholecystectomy, TIA, cardiac stents, gastric bypass Shaji-en-Y, left knee arthroscopy, foot wounds requiring VAC application and surgery, cervical disc fusion, bilateral rotator cuff repairs, and right knee replacement in 2011  He is a former smoker  He has allergies to atorvastatin, simvastatin, insulin, medical tape, statins, and adhesive electrodes  REVIEW OF SYSTEMS:  Reports that he is feeling poorly today   He reports ongoing pain and pressure when he urinates  He reports that he also has a lot of pain in his lower back , reports sometimes he needs to take breaks when performing ADLs because pain affects his ability to stand for prolonged periods of time  He tells me that the pain stays in the lower back and does not radiate into the buttocks or legs     He does not have numbness or tingling in his legs or feet  He has had no sudden loss of bladder or bowel continence  Patient states that every time he has a BCG injection or a cystoscopy he ends up with a urinary tract infection and requires antibiotics  He does not understand why the antibiotics are not helping this time  He is not sure of what types of bacteria have grown his urine previously  The patient reports that he is otherwise feeling okay   He denies nausea, vomiting , diarrhea , abdominal pain , cramping , bloating  He denies cough , shortness of breath , chest pain , sore throat, runny nose , congestion  He denies headaches and dizziness  He denies rashes, lesions , and wounds to the skin  A complete 12 point system-based review of systems is otherwise negative  PAST MEDICAL HISTORY:  Past Medical History:   Diagnosis Date    Anemia     Last assessed: 9/28/17    Arteriosclerotic cardiovascular disease     Last assessed: 9/28/17    [facility] )     Cancer ([facility] )     bladder    Cardiac disease     Diabetes mellitus ([facility] )     Hypertension     Hypotension     Last assessed: 2/24/15    Insomnia     Last assessed: 11/14/12    Other seasonal allergic rhinitis     Last assessed: 2/10/16    Transient cerebral ischemia      Past Surgical History:   Procedure Laterality Date    CARDIAC SURGERY      Cath stent placement  Last assessed: 3/9/17  Interventional Catheterization    CHOLECYSTECTOMY      CYSTOSCOPY      Diagnostic w/biopsy  Dimitris Gomes  Last assessed: 12/1/14    CYSTOURETHROSCOPY      w/cautery  Dimitris Gomes    GASTRIC BYPASS      For morbid obesity w/Shaji-en-Y   Resolved: 11/17/09    INCISION AND DRAINAGE OF WOUND Right 2/26/2017    Procedure: INCISION AND DRAINAGE (I&D) EXTREMITY WITH APPLICATION OF GRAFT JACKET;  Surgeon: Donnell Ryo DPM;  [facility];  Service:     INCISION AND DRAINAGE OF WOUND Right 4/25/2017    Procedure: INCISION AND DRAINAGE (I&D) EXTREMITY, APPLICATION OF GRAFT;  Surgeon: Edin Buchanan DPM;  [facility];  Service:     JOINT REPLACEMENT      Knee  Last assessed: 11    CA CYSTOURETHROSCOPY,BIOPSY N/A 2016    Procedure: Ifeoma Roll;  Surgeon: Terence Bocanegra MD;  [facility];  Service: Urology    ROTATOR CUFF REPAIR      SMALL INTESTINE SURGERY      Surgery Shaji-en-Y    SPINAL FUSION      VAC DRESSING APPLICATION Right     Procedure: APPLICATION VAC DRESSING;  Surgeon: Edin Buchanan DPM;  [facility];  Service:      FAMILY HISTORY:  Non-contributory    SOCIAL HISTORY:  Social History   Social History     Substance and Sexual Activity   Alcohol Use Never    Frequency: 2-3 times a week    Drinks per session: 10 or more     Social History     Substance and Sexual Activity   Drug Use Never     Social History     Tobacco Use   Smoking Status Former Smoker   Smokeless Tobacco Never Used     ALLERGIES:  Allergies   Allergen Reactions    Atorvastatin Hives, Other (See Comments), Itching and Rash     Pt  Reports "Hives"  Lipitor and Simvastatin  Other reaction(s): Other (See Comments)  Pt  Reports "Hives"  Lipitor and Simvastatin    Simvastatin Rash     Other reaction(s): Edema, Other Reaction  "ALL STATINS"    Insulin Lispro Edema and Swelling     Other reaction(s): Edema  Other reaction(s): Edema      Medical Tape      Other reaction(s): Other (See Comments)  rash to electrodes    Statins Itching    Other Other (See Comments), Itching and Rash     rash to electrodes  rash to electrodes and adhesives     MEDICATIONS:  All current active medications have been reviewed      ANTIBIOTICS:  Vancomycin 2 - stop  Zosyn 2 - stop  Antibiotics 2 - stop    PHYSICAL EXAM:  Temp:  [97 6 °F (36 4 °C)-98 8 °F (37 1 °C)] 97 8 °F (36 6 °C)  HR:  [62-76] 65  Resp:  [16-18] 18  BP: (116-148)/(75-98) 128/80  SpO2:  [97 %-100 %] 100 %  Temp (24hrs), Av 1 °F (36 7 °C), Min:97 6 °F (36 4 °C), Max:98 8 °F (37 1 °C)  Current: Temperature: 97 8 °F (36 6 °C)  No intake or output data in the 24 hours ending 06/30/20 1337    General Appearance:  Appearing well, nontoxic, and in no distress  He appeared comfortable sitting out of bed in his chair  Head:  Normocephalic, without obvious abnormality, atraumatic   Eyes:  Conjunctiva pink and sclera anicteric, both eyes   Nose: Nares normal, mucosa normal, no drainage   Throat: Oropharynx moist without lesions   Neck: Supple, symmetrical, no adenopathy, no tenderness/mass/nodules   Back:   Symmetric, ROM normal, no CVA tenderness; patient does report some pain when I palpate over the middle lumbar region   Lungs:   Clear to auscultation bilaterally, respirations unlabored, patient is on room air   Chest Wall:  No tenderness or deformity   Heart:  RRR; no murmur, rub or gallop   Abdomen:   Soft, non-tender, non-distended, positive bowel sounds throughout   Extremities: No cyanosis or clubbing, no edema   Skin: No rashes or lesions  No draining wounds noted  Lymph nodes: Cervical, supraclavicular nodes normal   Neurologic: Alert and oriented times 3, follows commands, symmetric facial movement     LABS, IMAGING, & OTHER STUDIES:  Lab Results:  I have personally reviewed pertinent labs  Results from last 7 days   Lab Units 06/30/20  0533 06/29/20  1504 06/24/20  0844   WBC Thousand/uL 10 19* 9 49 9 92   HEMOGLOBIN g/dL 12 5 12 1 11 8*   PLATELETS Thousands/uL 180 187 167     Results from last 7 days   Lab Units 06/30/20  0533 06/29/20  1504 06/24/20  0844   POTASSIUM mmol/L 4 5 4 7 5 4*   CHLORIDE mmol/L 105 104 104   CO2 mmol/L 19* 24 24   BUN mg/dL 41* 42* 51*   CREATININE mg/dL 2 56* 2 62* 3 58*   EGFR ml/min/1 73sq m 27 26 18   CALCIUM mg/dL 9 0 8 6 9 1   AST U/L 61* 50* 69*   ALT U/L 100* 97* 96*   ALK PHOS U/L 102 100 100     Results from last 7 days   Lab Units 06/29/20  1917 06/29/20  1900 06/24/20  0850   BLOOD CULTURE  Received in Microbiology Lab  Culture in Progress  Received in Microbiology Lab  Culture in Progress  --    URINE CULTURE   --   --  No Growth <1000 cfu/mL     Imaging Studies:   I have personally reviewed pertinent imaging study reports and images in PACS  CT RECON ONLY LUMBAR SPINE 6/29/2020 - I personally reviewed this study which showed multilevel degenerative disease throughout the lumbar spine  A new endplate irregularity is noted at L1-L2 with loss of vacuum disc phenomenon  CT CHEST ABDOMEN PELVIS WO CONTRAST 6/29/2020 - I personally reviewed this study which showed no acute infectious cardiopulmonary findings  Patient's prostate is enlarged  There is urinary bladder wall thickening      Other Studies:   Blood cultures are pending  Urine culture is pending  Patient is ordered to complete an MRI of the lumbar spine

## 2020-06-30 NOTE — TELEPHONE ENCOUNTER
Upon review of the In Basket request and the patient's chart, initial outreach has been made via fax, please see Contacts section for details  A second outreach attempt will be made within 5 business days      [phone]       Thank you  Keshia Davison MA

## 2020-06-30 NOTE — PLAN OF CARE
Problem: PAIN - ADULT  Goal: Verbalizes/displays adequate comfort level or baseline comfort level  Description  Interventions:  - Encourage patient to monitor pain and request assistance  - Assess pain using appropriate pain scale  - Administer analgesics based on type and severity of pain and evaluate response  - Implement non-pharmacological measures as appropriate and evaluate response  - Consider cultural and social influences on pain and pain management  - Notify physician/advanced practitioner if interventions unsuccessful or patient reports new pain  Outcome: Progressing     Problem: INFECTION - ADULT  Goal: Absence or prevention of progression during hospitalization  Description  INTERVENTIONS:  - Assess and monitor for signs and symptoms of infection  - Monitor lab/diagnostic results  - Monitor all insertion sites, i e  indwelling lines, tubes, and drains  - Monitor endotracheal if appropriate and nasal secretions for changes in amount and color  - Brinson appropriate cooling/warming therapies per order  - Administer medications as ordered  - Instruct and encourage patient and family to use good hand hygiene technique  - Identify and instruct in appropriate isolation precautions for identified infection/condition  Outcome: Progressing  Goal: Absence of fever/infection during neutropenic period  Description  INTERVENTIONS:  - Monitor WBC    Outcome: Progressing     Problem: SAFETY ADULT  Goal: Patient will remain free of falls  Description  INTERVENTIONS:  - Assess patient frequently for physical needs  -  Identify cognitive and physical deficits and behaviors that affect risk of falls    -  Brinson fall precautions as indicated by assessment   - Educate patient/family on patient safety including physical limitations  - Instruct patient to call for assistance with activity based on assessment  - Modify environment to reduce risk of injury  - Consider OT/PT consult to assist with strengthening/mobility  Outcome: Progressing  Goal: Maintain or return to baseline ADL function  Description  INTERVENTIONS:  -  Assess patient's ability to carry out ADLs; assess patient's baseline for ADL function and identify physical deficits which impact ability to perform ADLs (bathing, care of mouth/teeth, toileting, grooming, dressing, etc )  - Assess/evaluate cause of self-care deficits   - Assess range of motion  - Assess patient's mobility; develop plan if impaired  - Assess patient's need for assistive devices and provide as appropriate  - Encourage maximum independence but intervene and supervise when necessary  - Involve family in performance of ADLs  - Assess for home care needs following discharge   - Consider OT consult to assist with ADL evaluation and planning for discharge  - Provide patient education as appropriate  Outcome: Progressing  Goal: Maintain or return mobility status to optimal level  Description  INTERVENTIONS:  - Assess patient's baseline mobility status (ambulation, transfers, stairs, etc )    - Identify cognitive and physical deficits and behaviors that affect mobility  - Identify mobility aids required to assist with transfers and/or ambulation (gait belt, sit-to-stand, lift, walker, cane, etc )  - Erwin fall precautions as indicated by assessment  - Record patient progress and toleration of activity level on Mobility SBAR; progress patient to next Phase/Stage  - Instruct patient to call for assistance with activity based on assessment  - Consider rehabilitation consult to assist with strengthening/weightbearing, etc   Outcome: Progressing     Problem: DISCHARGE PLANNING  Goal: Discharge to home or other facility with appropriate resources  Description  INTERVENTIONS:  - Identify barriers to discharge w/patient and caregiver  - Arrange for needed discharge resources and transportation as appropriate  - Identify discharge learning needs (meds, wound care, etc )  - Arrange for interpretive services to assist at discharge as needed  - Refer to Case Management Department for coordinating discharge planning if the patient needs post-hospital services based on physician/advanced practitioner order or complex needs related to functional status, cognitive ability, or social support system  Outcome: Progressing     Problem: Knowledge Deficit  Goal: Patient/family/caregiver demonstrates understanding of disease process, treatment plan, medications, and discharge instructions  Description  Complete learning assessment and assess knowledge base    Interventions:  - Provide teaching at level of understanding  - Provide teaching via preferred learning methods  Outcome: Progressing

## 2020-06-30 NOTE — PLAN OF CARE
Problem: PHYSICAL THERAPY ADULT  Goal: Performs mobility at highest level of function for planned discharge setting  See evaluation for individualized goals  Description  Treatment/Interventions: Functional transfer training, LE strengthening/ROM, Elevations, Therapeutic exercise, Endurance training, Patient/family training, Equipment eval/education, Bed mobility, Gait training, Compensatory technique education, OT, Continued evaluation, Family          See flowsheet documentation for full assessment, interventions and recommendations  Note:   Prognosis: Fair  Problem List: Decreased strength, Decreased endurance, Decreased mobility, Impaired sensation, Pain  Assessment: Ed Ivette is a 66 y o  male admitted to [facility] on 6/29/2020 for Urinary tract infection  Pending MRI L/s  Pt  has a past medical history of Anemia, Arteriosclerotic cardiovascular disease, Bladder cancer ([facility] [address] ), Cancer ([facility] ), Cardiac disease, Diabetes mellitus ([facility] ), Hypertension, Hypotension, Insomnia, Other seasonal allergic rhinitis, and Transient cerebral ischemia    PT was consulted and pt was seen on 6/30/2020 for mobility assessment and d/c planning  Pt presents low fall risk, PIV  Pt is currently functioning at a supervision assistance x1 level for transfers, supervision assistance x1 level for ambulation with no assistive device  Pt denies sx at rest/seated  Pt demonstrated decreased ambulatory endurance secondary to onset of LBP w standing/ambulation  Denies issues w ambulation just states he needs to use SPC sometimes for pain relief/support  Educated pt on use of RW for pain relief prn given increased support  Pt verbalize understanding and also verbalize understanding of safety/fall risk precautions  Pt will benefit from continued skilled IP PT to address the above mentioned impairments  in order to maximize recovery and increase functional independence when completing mobility and ADLs   Currently PT recommendations for DME include continue use of RW vs SPC prn  At this time PT recommendations for d/c are home w family support, OPPT  Pt may benefit from [facility] comprehensive spine program pending MRI results (to determine if contributing to LBP)  End of session pt seated in bedside recliner, all needs in reach  Wife present  Barriers to Discharge: Inaccessible home environment  Barriers to Discharge Comments: steps  PT Discharge Recommendation: Return to previous environment with social support, Home with skilled therapy(OPPT- may benefit from comp spine program pend MRI)     PT - OK to Discharge: Yes    See flowsheet documentation for full assessment

## 2020-06-30 NOTE — TELEPHONE ENCOUNTER
----- Message from Sanaz Godinez, [address] [place] sent at 6/29/2020  4:20 PM EDT -----  Regarding: diabetic eye exam  06/29/20 4:20 PM    Hello, our patient Alcides Vasquez has had a diabetic eye exam completed/performed  Please assist in obtaining the exclusion documentation by [facility]  The date of service is unknown       Thank you,  Sanaz Godinez MA  [facility] [place]

## 2020-06-30 NOTE — ASSESSMENT & PLAN NOTE
· Discussed with ID  · Observe off antibiotics since this is due to the BCG instillation itself and not due to an acute UTI  · Supportive care

## 2020-06-30 NOTE — PROGRESS NOTES
Progress Note - Nehemiah Abdi 1945, 66 y o  male MRN: [id number]    Unit/Bed#: E5 -01 Encounter: [id number]    Primary Care Provider: Ana Joseph MD   Date and time admitted to hospital: 2020  1:59 PM        * Cystitis due to intravesical BCG administration  Assessment & Plan  · Discussed with ID  · Observe off antibiotics since this is due to the BCG instillation itself and not due to an acute UTI  · Supportive care    Abnormal CT scan, lumbar spine  Assessment & Plan  · MRI pending for further evaluation  · ID recommendations reviewed  · Suspicion for infection is lower now    Chronic kidney disease, stage 4 (severe) (HCC)  Assessment & Plan  Stable and at baseline  Monitor closely    Malignant tumor of urinary bladder [facility])  Assessment & Plan  Follow up withMg Mahmood for further treatment  Last had a BCG treatment last 19  Transaminitis  Assessment & Plan  Mild  Probably related to malignancy and chemo  Monitor    Hypertension  Assessment & Plan  Continue metoprolol succinate 100 mg daily      VTE Pharmacologic Prophylaxis:   Pharmacologic: Heparin  Mechanical VTE Prophylaxis in Place: No    Patient Centered Rounds: I have performed bedside rounds with nursing staff today  Discussions with Specialists or Other Care Team Provider: ID    Time Spent for Care: 25 minutes  More than 50% of total time spent on counseling and coordination of care as described above      Current Length of Stay: 1 day(s)    Current Patient Status: Inpatient   Certification Statement: The patient will continue to require additional inpatient hospital stay due to cystitis    Discharge Plan: to be determined    Code Status: Level 1 - Full Code      Subjective:   No fever  Intermittent bladder pain and discomfort which he usually gets after BCG treatments  Denies leg weakness or difficulty walking    Objective:     Vitals:   Temp (24hrs), Av 9 °F (36 6 °C), Min:97 5 °F (36 4 °C), Max:98 8 °F (37 1 °C)    Temp:  [97 5 °F (36 4 °C)-98 8 °F (37 1 °C)] 97 5 °F (36 4 °C)  HR:  [62-72] 64  Resp:  [16-18] 18  BP: (108-148)/(59-98) 108/59  SpO2:  [99 %-100 %] 99 %  Body mass index is 29 06 kg/m²  Input and Output Summary (last 24 hours):     No intake or output data in the 24 hours ending 06/30/20 1706    Physical Exam:     Physical Exam   Constitutional: He is oriented to person, place, and time  He appears well-developed  No distress  HENT:   Head: Normocephalic and atraumatic  Eyes: No scleral icterus  Neck: No JVD present  Cardiovascular: Regular rhythm  Exam reveals no gallop and no friction rub  No murmur heard  Pulmonary/Chest: Effort normal  No stridor  No respiratory distress  He has no wheezes  Abdominal: Soft  He exhibits no distension  There is no tenderness  There is no guarding  Musculoskeletal: He exhibits no edema  Neurological: He is alert and oriented to person, place, and time  Skin: Skin is warm and dry  He is not diaphoretic  Psychiatric: He has a normal mood and affect  His behavior is normal    Vitals reviewed      Additional Data:     Labs:    Results from last 7 days   Lab Units 06/30/20  0533   WBC Thousand/uL 10 19*   HEMOGLOBIN g/dL 12 5   HEMATOCRIT % 37 2   PLATELETS Thousands/uL 180   NEUTROS PCT % 78*   LYMPHS PCT % 12*   MONOS PCT % 7   EOS PCT % 2     Results from last 7 days   Lab Units 06/30/20  0533   SODIUM mmol/L 137   POTASSIUM mmol/L 4 5   CHLORIDE mmol/L 105   CO2 mmol/L 19*   BUN mg/dL 41*   CREATININE mg/dL 2 56*   ANION GAP mmol/L 13   CALCIUM mg/dL 9 0   ALBUMIN g/dL 3 7   TOTAL BILIRUBIN mg/dL 0 38   ALK PHOS U/L 102   ALT U/L 100*   AST U/L 61*   GLUCOSE RANDOM mg/dL 116         Results from last 7 days   Lab Units 06/30/20  1610 06/30/20  1328 06/30/20  1200 06/30/20  0725 06/29/20  2041   POC GLUCOSE mg/dl 146* 289* 197* 116 276*     Results from last 7 days   Lab Units 06/24/20  0844   HEMOGLOBIN A1C % 7 5*               * I Have Reviewed All Lab Data Listed Above  * Additional Pertinent Lab Tests Reviewed: [address] Admission Reviewed    Imaging:    Imaging Reports Reviewed Today Include: ct       Recent Cultures (last 7 days):     Results from last 7 days   Lab Units 06/29/20 1917 06/29/20  1900 06/24/20  0850   BLOOD CULTURE  Received in Microbiology Lab  Culture in Progress  Received in Microbiology Lab  Culture in Progress  --    URINE CULTURE   --   --  No Growth <1000 cfu/mL       Last 24 Hours Medication List:     Current Facility-Administered Medications:  acetaminophen 650 mg Oral Q6H PRN Lynda Piger, PA-C   ALPRAZolam 0 25 mg Oral BID PRN Trudy Day, PA-C   aspirin 81 mg Oral Daily Lynda Piger, PA-C   bimatoprost 1 drop Both Eyes HS Lynda Piger, PA-C   doxylamine 25 mg Oral HS PRN Trudy Day, PA-C   gabapentin 600 mg Oral Daily Lynda Piger, PA-C   heparin (porcine) 5,000 Units Subcutaneous Q8H [address] Lynda Piger, PA-C   insulin glargine 30 Units Subcutaneous HS Lynda Piger, PA-C   insulin lispro 1-6 Units Subcutaneous TID AC Lynda Piger, PA-C   insulin lispro 1-6 Units Subcutaneous HS Lynda Piger, PA-C   insulin lispro 10 Units Subcutaneous TID With Meals Trudy Day, PA-C   isosorbide mononitrate 90 mg Oral Daily Lynda Piger, PA-C   metoprolol succinate 100 mg Oral Daily Lynda Piger, PA-C   ondansetron 4 mg Intravenous Q6H PRN Lynda Piger, PA-C   pantoprazole 40 mg Oral Early Morning Lynda Piger, PA-C   sertraline 50 mg Oral Daily Trudy Shay, PA-C        Today, Patient Was Seen By: Luz Mcpherson MD    ** Please Note: Dictation voice to text software may have been used in the creation of this document   **

## 2020-06-30 NOTE — ASSESSMENT & PLAN NOTE
Presenting with symptoms of dysuria, lower abdominal discomfort, hesitancy  Urinalysis with moderate leukocytes, innumerable WBCs, occasional bacteria, occasional epithelial cells, 30-50 RBCs  Started on IV ceftriaxone in ED  CT of abdomen revealed diffuse urinary bladder wall thickening with new surrounding inflammatory change suspicious for acute infection imposed on history of bladder neoplasm  Started on IV antibiotics- zosyn  Blood cultures pending  Await urinary cultures

## 2020-06-30 NOTE — ASSESSMENT & PLAN NOTE
Follows with Mary Beth Mathew and is currently receiving chemo  Last had a BCG treatment (injection of chemo into bladder) last Tuesday 6/23/19  Gets injections every few weeks followed by a cystoscopy

## 2020-06-30 NOTE — ASSESSMENT & PLAN NOTE
CT lumbar spine with interval development of endplate irregularity at L1-L2 with loss of vacuum disc phenomenon  Findings raise possibility for superimposed infection on the background of degenerative disc disease    Correlation with MRI as well as ESR levels is further recommended    -Obtain MRI lumbar spine   -ESR rate 24   -Started on IV vanco and zosyn   -Consult infectious disease

## 2020-06-30 NOTE — H&P
H&P- Didierchristine Meraz 1945, 66 y o  male MRN: [id number]    Unit/Bed#: E5 -01 Encounter: [id number]    Primary Care Provider: Mandy Teran MD   Date and time admitted to hospital: 6/29/2020  1:59 PM        * Urinary tract infection  Assessment & Plan  Presenting with symptoms of dysuria, lower abdominal discomfort, hesitancy  Urinalysis with moderate leukocytes, innumerable WBCs, occasional bacteria, occasional epithelial cells, 30-50 RBCs  Started on IV ceftriaxone in ED  CT of abdomen revealed diffuse urinary bladder wall thickening with new surrounding inflammatory change suspicious for acute infection imposed on history of bladder neoplasm  Started on IV antibiotics- zosyn  Blood cultures pending  Await urinary cultures  Abnormal CT scan, lumbar spine  Assessment & Plan  CT lumbar spine with interval development of endplate irregularity at L1-L2 with loss of vacuum disc phenomenon  Findings raise possibility for superimposed infection on the background of degenerative disc disease  Correlation with MRI as well as ESR levels is further recommended    -Obtain MRI lumbar spine   -ESR rate 24   -Started on IV vanco and zosyn   -Consult infectious disease    Transaminitis  Assessment & Plan  AST 50, ALT 97  Hypertension  Assessment & Plan  Home regimen metoprolol succinate 100 mg daily    Chronic kidney disease, stage 4 (severe) (HCC)  Assessment & Plan  Presenting with creatinine of 2 62  Appears to be within baseline which is around 2 5  Malignant tumor of urinary bladder [facility])  Assessment & Plan  Follows with Mague He and is currently receiving chemo  Last had a BCG treatment (injection of chemo into bladder) last Tuesday 6/23/19  Gets injections every few weeks followed by a cystoscopy  Presence of stent in coronary artery  Assessment & Plan  Reports history of stenting November 2019    Reports he inquired about being taken off of Plavix  Dex Check was had with Dr Jackson Acosta of Cardiology at [facility]- Patient is aware of the risk of stent thrombosis  VTE Prophylaxis: Heparin  / sequential compression device   Code Status:  Full code  Anticipated Length of Stay:  Initially admitted under observation status however will require greater than 2 midnight stay as an inpatient  Justification for Hospital Stay:  Acute UTI / GARY with need for further treatment and monitoring    Chief Complaint:   Burning with urination / lower abdominal discomfort    History of Present Illness:    Amanda Yip is a 66 y o  male with past medical history of bladder cancer currently undergoing chemotherapy, essential hypertension, hyperlipidemia, history of laminectomy 2019, CKD stage 4, essential hypertension    Last had a BCG treatment (injection of chemo into bladder) last Tuesday 6/23/19  Since then he has been experiencing difficulties with urination  He called his PCP who placed him on Cipro 250 mg for 1 week  Patient normally is placed on 500 mg but dose was reduced secondary to rise in kidney function  He presents today with persistent complaints of urinary difficulty  Patient has extreme burning, hesitancy, urgency, frequency with urination  He notes his urinary output has been decreased and feels like he is urinating every 15 minutes  He does not feel he is improving on his Cipro  He also states he has been experiencing back pain  He has been unable to sleep secondary to urinary frequency and back pain  Had a laminectomy in June of 2019 and has had trouble on and off with back pain since then  Specifically worsening back pain now  The pain prevents him from being able to ambulate as he normally would  He called [facility] who scheduled him for an outpatient CT and MRI of his lumbar spine  While in the ED patient had CT of his lumbar spine performed with concern for possible infection at the level of L1-L2    An MRI was further recommended for additional evaluation  Lives at home with wife  Ambulates with a cane at baseline  Denies smoking or other drug use  Occasional social alcohol consumption  Currently without any chest pain, chest pressure, shortness of breath, fevers or chills  Review of Systems:    General:   No Fever or chills; + back pain, dysuria   EENT:   No ear pain, facial swelling; No sneezing, sore throat  Skin:   No rashes, color changes  Respiratory:     No shortness of breath, cough,   Cardiovascular:     No chest pain, palpitations  Gastrointestinal:    No nausea, vomiting, diarrhea; No abdominal pain  Musculoskeletal:     No arthralgias, myalgias, swelling  Neurologic:   No dizziness, numbness, weakness  No speech difficulties  Psych:   No agitation,     Otherwise, All other twelve-point review of systems normal      Past Medical and Surgical History:     Past Medical History:   Diagnosis Date    Anemia     Last assessed: 9/28/17    Arteriosclerotic cardiovascular disease     Last assessed: 9/28/17    [facility]     Cancer ([facility] )     bladder    Cardiac disease     Diabetes mellitus ([facility] )     Hypertension     Hypotension     Last assessed: 2/24/15    Insomnia     Last assessed: 11/14/12    Other seasonal allergic rhinitis     Last assessed: 2/10/16    Transient cerebral ischemia        Past Surgical History:   Procedure Laterality Date    CARDIAC SURGERY      Cath stent placement  Last assessed: 3/9/17  Interventional Catheterization    CHOLECYSTECTOMY      CYSTOSCOPY      Diagnostic w/biopsy  Nasrin Ritter  Last assessed: 12/1/14    CYSTOURETHROSCOPY      w/cautery  Nasrin Starchkimberly    GASTRIC BYPASS      For morbid obesity w/Shaji-en-Y   Resolved: 11/17/09    INCISION AND DRAINAGE OF WOUND Right 2/26/2017    Procedure: INCISION AND DRAINAGE (I&D) EXTREMITY WITH APPLICATION OF GRAFT JACKET;  Surgeon: Sol Cr DPM;  [facility]: [facility];  Service:     INCISION AND DRAINAGE OF WOUND Right 4/25/2017    Procedure: INCISION AND DRAINAGE (I&D) EXTREMITY, APPLICATION OF GRAFT;  Surgeon: Stoney Hall DPM;  [facility];  Service:     JOINT REPLACEMENT      Knee   Last assessed: 1/28/11    MN CYSTOURETHROSCOPY,BIOPSY N/A 8/16/2016    Procedure: CYSTOSCOPY WITH BIOPSIES;  Surgeon: Kelli Bonner MD;  [facility];  Service: Urology    ROTATOR CUFF REPAIR      SMALL INTESTINE SURGERY      Surgery Shaji-en-Y    SPINAL FUSION      VAC DRESSING APPLICATION Right 8/70/8104    Procedure: APPLICATION VAC DRESSING;  Surgeon: Stoney Hall DPM;  [facility];  Service:        Meds/Allergies:    Current Facility-Administered Medications   Medication Dose Route Frequency Provider Last Rate Last Dose    acetaminophen (TYLENOL) tablet 650 mg  650 mg Oral Q6H PRN Tom Brady PA-C        ALPRAZolam [place]) tablet 0 25 mg  0 25 mg Oral BID PRN Tom Brady PA-C        [START ON 6/30/2020] aspirin chewable tablet 81 mg  81 mg Oral Daily Lynda Ang PA-C        bimatoprost (LUMIGAN) 0 01 % ophthalmic solution 1 drop  1 drop Both Eyes HS Lynda Ang PA-C        doxylamine (UNISON) tablet 25 mg  25 mg Oral HS PRN Tom Brady PA-C        [START ON 6/30/2020] gabapentin (NEURONTIN) capsule 600 mg  600 mg Oral Daily Lynda Ang PA-C        heparin (porcine) subcutaneous injection 5,000 Units  5,000 Units Subcutaneous Q8H [address] Lynda Ang PA-C        insulin glargine (LANTUS) subcutaneous injection 30 Units 0 3 mL  30 Units Subcutaneous HS Lynda Ang PA-C        [START ON 6/30/2020] insulin lispro (HumaLOG) 100 units/mL subcutaneous injection 1-6 Units  1-6 Units Subcutaneous TID AC Lynda Ang PA-C        insulin lispro (HumaLOG) 100 units/mL subcutaneous injection 1-6 Units  1-6 Units Subcutaneous HS Lynda Ang PA-C        [START ON 6/30/2020] insulin lispro (HumaLOG) 100 units/mL subcutaneous injection 10 Units  10 Units Subcutaneous TID With Meals Crow Austin PA-C        [START ON 6/30/2020] isosorbide mononitrate (IMDUR) 24 hr tablet 90 mg  90 mg Oral Daily Lynda Ang PA-C        [START ON 6/30/2020] metoprolol succinate (TOPROL-XL) 24 hr tablet 100 mg  100 mg Oral Daily ANA ReynosoC        ondansetron (ZOFRAN) injection 4 mg  4 mg Intravenous Q6H PRN ANA ReynosoC        [START ON 6/30/2020] pantoprazole (PROTONIX) EC tablet 40 mg  40 mg Oral Early Morning ANA ReynosoC        [START ON 6/30/2020] piperacillin-tazobactam (ZOSYN) 3 375 g in sodium chloride 0 9 % 100 mL IVPB  3 375 g Intravenous Q6H ANA ReynosoC        [START ON 6/30/2020] sertraline (ZOLOFT) tablet 50 mg  50 mg Oral Daily ANA ReynosoC        [START ON 6/30/2020] vancomycin (VANCOCIN) 1,500 mg in sodium chloride 0 9 % 250 mL IVPB  15 mg/kg Intravenous Q24H ANA ReynosoC           Allergies   Allergen Reactions    Atorvastatin Hives, Other (See Comments), Itching and Rash     Pt  Reports "Hives"  Lipitor and Simvastatin  Other reaction(s): Other (See Comments)  Pt  Reports "Hives"  Lipitor and Simvastatin    Simvastatin Rash     Other reaction(s): Edema, Other Reaction  "ALL STATINS"    Insulin Lispro Edema and Swelling     Other reaction(s): Edema  Other reaction(s): Edema      Medical Tape      Other reaction(s): Other (See Comments)  rash to electrodes    Statins Itching    Other Other (See Comments), Itching and Rash     rash to electrodes  rash to electrodes and adhesives       Allergies: Allergies   Allergen Reactions    Atorvastatin Hives, Other (See Comments), Itching and Rash     Pt  Reports "Hives"  Lipitor and Simvastatin  Other reaction(s): Other (See Comments)  Pt   Reports "Hives"  Lipitor and Simvastatin    Simvastatin Rash     Other reaction(s): Edema, Other Reaction  "ALL STATINS"    Insulin Lispro Edema and Swelling     Other reaction(s): Edema  Other reaction(s): Edema      Medical Tape      Other reaction(s): Other (See Comments)  rash to electrodes    Statins Itching    Other Other (See Comments), Itching and Rash     rash to electrodes  rash to electrodes and adhesives       Social History:     Marital Status: /Civil Union     Substance Use History:   Social History     Substance and Sexual Activity   Alcohol Use Yes    Frequency: 2-3 times a week    Drinks per session: 10 or more     Social History     Tobacco Use   Smoking Status Former Smoker   Smokeless Tobacco Never Used     Social History     Substance and Sexual Activity   Drug Use No       Family History:    non-contributory    Physical Exam:     Vitals:   Blood Pressure: 148/98 (06/29/20 2006)  Pulse: 72 (06/29/20 2006)  Temperature: 98 8 °F (37 1 °C) (06/29/20 2006)  Temp Source: Oral (06/29/20 2006)  Respirations: 18 (06/29/20 2006)  Weight - Scale: 108 kg (238 lb 12 1 oz) (06/29/20 1331)  SpO2: 100 % (06/29/20 2006)    Physical Exam   Constitutional: He is oriented to person, place, and time  He appears well-developed and well-nourished  No distress  HENT:   Head: Normocephalic and atraumatic  Eyes: No scleral icterus  Neck: No tracheal deviation present  Cardiovascular: Normal rate, regular rhythm and normal heart sounds  Pulmonary/Chest: Effort normal and breath sounds normal  No stridor  No respiratory distress  He has no wheezes  He has no rales  He exhibits no tenderness  Abdominal: Soft  Bowel sounds are normal  He exhibits no distension and no mass  There is no tenderness  There is no rebound and no guarding  No hernia  Musculoskeletal: He exhibits tenderness  He exhibits no deformity  CVA tenderness   Neurological: He is alert and oriented to person, place, and time  Skin: Skin is warm and dry  He is not diaphoretic  Psychiatric: He has a normal mood and affect  His behavior is normal    Nursing note and vitals reviewed  Additional Data:     Lab Results: I have personally reviewed pertinent reports        Results from last 7 days   Lab Units 06/29/20  1504   WBC Thousand/uL 9 49   HEMOGLOBIN g/dL 12 1   HEMATOCRIT % 36 3*   PLATELETS Thousands/uL 187   NEUTROS PCT % 61   LYMPHS PCT % 28   MONOS PCT % 7   EOS PCT % 2     Results from last 7 days   Lab Units 06/29/20  1504   POTASSIUM mmol/L 4 7   CHLORIDE mmol/L 104   CO2 mmol/L 24   BUN mg/dL 42*   CREATININE mg/dL 2 62*   CALCIUM mg/dL 8 6   ALK PHOS U/L 100   ALT U/L 97*   AST U/L 50*           Imaging: I have personally reviewed pertinent reports  Ct Chest Abdomen Pelvis Wo Contrast    Result Date: 6/29/2020  Narrative: CT CHEST, ABDOMEN AND PELVIS WITHOUT IV CONTRAST INDICATION:   abnormal xray  COMPARISON:  9/3/2019 TECHNIQUE: CT examination of the chest, abdomen and pelvis was performed without intravenous contrast   Axial, sagittal, and coronal 2D reformatted images were created from the source data and submitted for interpretation  Radiation dose length product (DLP) for this visit:  846 mGy-cm   This examination, like all CT scans performed in the [facility], was performed utilizing techniques to minimize radiation dose exposure, including the use of iterative reconstruction and automated exposure control  Enteric contrast was administered  FINDINGS: CHEST LUNGS:  No suspicious pulmonary nodules are identified to correspond to the abnormality on prior radiograph  The central airways are patent  PLEURA:  Unremarkable  HEART/GREAT VESSELS:  There is coronary artery calcification  Heart is normal in size  There is no pericardial effusion  MEDIASTINUM AND DIMAS:  Unremarkable  CHEST WALL AND LOWER NECK:   Unremarkable  ABDOMEN LIVER/BILIARY TREE:  Unremarkable  GALLBLADDER:  Gallbladder is surgically absent  SPLEEN:  Unremarkable  PANCREAS:  Unremarkable  ADRENAL GLANDS:  Unremarkable  KIDNEYS/URETERS:  One or more simple renal cyst(s) is noted  Otherwise unremarkable kidneys  No hydronephrosis  STOMACH AND BOWEL:  Status post gastric bypass  There is no bowel obstruction  There is colonic diverticulosis without evidence for diverticulitis  APPENDIX:  No findings to suggest appendicitis  ABDOMINOPELVIC CAVITY:  No ascites  No pneumoperitoneum  No lymphadenopathy  VESSELS:  Atherosclerotic changes are present  No evidence of aneurysm  PELVIS REPRODUCTIVE ORGANS:  Prostate gland is enlarged measuring up to 6 7 cm  URINARY BLADDER:  There is urinary bladder wall thickening with surrounding inflammatory change  Findings likely represent a combination of infection superimposed on a background of the patient's known bladder neoplasm  ABDOMINAL WALL/INGUINAL REGIONS:  Probable injection sites are noted within the ventral abdominal wall as previously suggested  OSSEOUS STRUCTURES:  There are degenerative changes of the spine  Correlate with separate CT lumbar spine report for the remainder the findings  Impression: No focal airspace consolidation or suspicious nodule to correlate with findings on prior chest x-ray  Diffuse urinary bladder wall thickening with new surrounding inflammatory change  Findings are suspicious for infection superimposed on background of the patient's known bladder neoplasm  Enlarged prostate gland  Remainder of the findings, as described above  Workstation performed: [id number]     Xr Chest 2 Views    Result Date: 6/9/2020  Narrative: CHEST INDICATION:   chills  COMPARISON:  7/25/2015 and 2/22/2017 EXAM PERFORMED/VIEWS:  XR CHEST PA & LATERAL  The frontal view was performed utilizing dual energy radiographic technique  Images: 4 FINDINGS: Cardiomediastinal silhouette appears unremarkable  Linear/oblong triangular shaped density in the right lower lobe not seen on prior exams likely represents scarring or atelectasis however recommend elective chest CT for confirmation of benignity  The lungs are otherwise clear  No pneumothorax or pleural effusion  Osseous structures appear within normal limits for patient age  Impression: Linear/oblong triangular shaped density in the right lower lobe not seen on prior exams likely represents scarring or atelectasis however recommend elective chest CT for confirmation of benignity  No effusion, airspace disease or pneumothorax  The study was marked in [facility] for immediate notification  Workstation performed: [id number]     Ct Recon Only Lumbar Spine (no Charge)    Result Date: 6/29/2020  Narrative: CT LUMBAR SPINE INDICATION:   pain h/o cancer and back pain  COMPARISON:  CT from 9/3/2019 and 1/31/2019 TECHNIQUE: Axial CT examination of the lumbar spine was obtained utilizing reconstructed images from CT of the chest, abdomen and pelvis performed the same day  Images were reformatted in the sagittal and coronal planes  This examination, like all CT scans performed in the [facility], was performed utilizing techniques to minimize radiation dose exposure, including the use of iterative reconstruction and automated exposure control  FINDINGS: ALIGNMENT: There is no acute fracture  There is trace retrolisthesis of L5-S1  There is scoliosis  VERTEBRAL BODIES: There is mild chronic wedging of the L5 vertebral body, stable  DEGENERATIVE CHANGES: In the interval since the prior examination there has been interval development of endplate irregularity at L1-L2  This is superimposed on a background of previously seen vacuum phenomenon at this level  There is ventral and dorsal osteophytosis noted  There is a laminectomy defect at this level  At L2-L3 there is disc space narrowing with a bulging annulus and dorsal osteophytosis  There is marginal osteophytosis  There is facet arthrosis  There is mild to moderate bony canal stenosis and mild foraminal narrowing  At L3-L4 there is vacuum disc phenomenon  There is a bulging annulus  There is dorsal and ventral osteophyte ptosis  There is marginal osteophytosis  There is facet arthrosis  There is moderate canal stenosis  There is moderate foraminal narrowing  At L4-L5 there is a bulging annulus  There is dorsal osteophytosis  There is facet arthrosis  There is marginal osteophytosis  There is mild canal stenosis  There is severe bilateral foraminal narrowing  At L5-S1 there is facet arthrosis  There is no significant bony canal stenosis or foraminal narrowing  PREVERTEBRAL AND PARASPINAL SOFT TISSUES: There is no significant prevertebral soft tissue swelling  Impression: Interval development of endplate irregularity at L1-L2 with loss of vacuum disc phenomena at this level  Findings raise the possibility for infection superimposed on the background of previously seen degenerative disc disease  Correlation with MRI as well as ESR levels is recommended for further evaluation  Multilevel degenerative changes of the lumbar spine, as described above  I personally discussed this study with Dr Kelley Trejo on 6/29/2020 at 4:42 PM  Workstation performed: [id number]       EKG, Pathology, and Other Studies Reviewed on Admission:   · EKG: none    Allscripts Records Reviewed: Yes     Total Time for Visit, including Counseling / Coordination of Care: 45 minutes  Greater than 50% of this total time spent on direct patient counseling and coordination of care  ** Please Note: This note has been constructed using a voice recognition system   **

## 2020-06-30 NOTE — LETTER
Diabetic Eye Exam Form    Date Requested: 20  Patient: Mau Rodgers  Patient : 1945   Referring Provider: Neeru Ashford MD    Dilated Retinal Exam, Optomap-Iris Exam, or Fundus Photography Done         Yes ([place] one above)         No     Date of Diabetic Eye Exam ______________________________  Left Eye      Exam did show retinopathy    Exam did not show retinopathy         Mild       Moderate       None       Proliferative       Severe     Right Eye     Exam did show retinopathy    Exam did not show retinopathy         Mild       Moderate       None       Proliferative       Severe     Comments __________________________________________________________    Practice Providing Exam ______________________________________________    Exam Performed By (print name) _______________________________________      Provider Signature ___________________________________________________      These reports are needed for  compliance    Please fax this completed form and a copy of the Diabetic Eye Exam report to our office located at [place] [address] as soon as possible to [phone] attention Barbara Coppola: Phone [phone]    We thank you for your assistance in treating our mutual patient

## 2020-06-30 NOTE — ASSESSMENT & PLAN NOTE
Reports history of stenting November 2019  Reports he inquired about being taken off of Plavix  Aldon Days was had with Dr Tiffanie Irwin of Cardiology at [facility]- Patient is aware of the risk of stent thrombosis

## 2020-06-30 NOTE — LETTER
Diabetic Eye Exam Form  Second Request    Date Requested: 20  Patient: Paul Marin  Patient : 1945   Referring Provider: Emy Hope MD    Dilated Retinal Exam, Optomap-Iris Exam, or Fundus Photography Done         Yes ([place] one above)         No     Date of Diabetic Eye Exam ______________________________  Left Eye      Exam did show retinopathy    Exam did not show retinopathy         Mild       Moderate       None       Proliferative       Severe     Right Eye     Exam did show retinopathy    Exam did not show retinopathy         Mild       Moderate       None       Proliferative       Severe     Comments __________________________________________________________    Practice Providing Exam ______________________________________________    Exam Performed By (print name) _______________________________________      Provider Signature ___________________________________________________      These reports are needed for  compliance    Please fax this completed form and a copy of the Diabetic Eye Exam report to our office located at [place] [address] as soon as possible to [phone] miky So: Phone [phone]    We thank you for your assistance in treating our mutual patient

## 2020-07-01 PROBLEM — T36.95XA ANTIBIOTIC-ASSOCIATED DIARRHEA: Status: ACTIVE | Noted: 2020-07-01

## 2020-07-01 PROBLEM — K52.1 ANTIBIOTIC-ASSOCIATED DIARRHEA: Status: ACTIVE | Noted: 2020-07-01

## 2020-07-01 LAB
C DIFF TOX GENS STL QL NAA+PROBE: NEGATIVE
GLUCOSE SERPL-MCNC: 127 MG/DL (ref 65–140)
GLUCOSE SERPL-MCNC: 138 MG/DL (ref 65–140)
GLUCOSE SERPL-MCNC: 152 MG/DL (ref 65–140)
GLUCOSE SERPL-MCNC: 84 MG/DL (ref 65–140)

## 2020-07-01 PROCEDURE — 82948 REAGENT STRIP/BLOOD GLUCOSE: CPT

## 2020-07-01 PROCEDURE — 99232 SBSQ HOSP IP/OBS MODERATE 35: CPT | Performed by: INTERNAL MEDICINE

## 2020-07-01 RX ORDER — LOPERAMIDE HYDROCHLORIDE 2 MG/1
4 CAPSULE ORAL ONCE
Status: COMPLETED | OUTPATIENT
Start: 2020-07-01 | End: 2020-07-01

## 2020-07-01 RX ORDER — LOPERAMIDE HYDROCHLORIDE 2 MG/1
2 CAPSULE ORAL 4 TIMES DAILY PRN
Status: DISCONTINUED | OUTPATIENT
Start: 2020-07-01 | End: 2020-07-03

## 2020-07-01 RX ADMIN — INSULIN LISPRO 10 UNITS: 100 INJECTION, SOLUTION INTRAVENOUS; SUBCUTANEOUS at 16:44

## 2020-07-01 RX ADMIN — LOPERAMIDE HYDROCHLORIDE 4 MG: 2 CAPSULE ORAL at 21:51

## 2020-07-01 RX ADMIN — BIMATOPROST 1 DROP: 0.1 SOLUTION/ DROPS OPHTHALMIC at 21:51

## 2020-07-01 RX ADMIN — INSULIN GLARGINE 30 UNITS: 100 INJECTION, SOLUTION SUBCUTANEOUS at 21:51

## 2020-07-01 RX ADMIN — PANTOPRAZOLE SODIUM 40 MG: 40 TABLET, DELAYED RELEASE ORAL at 05:51

## 2020-07-01 RX ADMIN — SERTRALINE HYDROCHLORIDE 50 MG: 50 TABLET ORAL at 09:03

## 2020-07-01 RX ADMIN — HEPARIN SODIUM 5000 UNITS: 5000 INJECTION INTRAVENOUS; SUBCUTANEOUS at 21:51

## 2020-07-01 RX ADMIN — INSULIN LISPRO 10 UNITS: 100 INJECTION, SOLUTION INTRAVENOUS; SUBCUTANEOUS at 12:04

## 2020-07-01 RX ADMIN — INSULIN LISPRO 1 UNITS: 100 INJECTION, SOLUTION INTRAVENOUS; SUBCUTANEOUS at 16:44

## 2020-07-01 RX ADMIN — DOXYLAMINE SUCCINATE 25 MG: 25 TABLET ORAL at 02:37

## 2020-07-01 RX ADMIN — HEPARIN SODIUM 5000 UNITS: 5000 INJECTION INTRAVENOUS; SUBCUTANEOUS at 05:51

## 2020-07-01 RX ADMIN — ASPIRIN 81 MG 81 MG: 81 TABLET ORAL at 09:03

## 2020-07-01 RX ADMIN — INSULIN LISPRO 10 UNITS: 100 INJECTION, SOLUTION INTRAVENOUS; SUBCUTANEOUS at 09:03

## 2020-07-01 RX ADMIN — METOPROLOL SUCCINATE 100 MG: 50 TABLET, EXTENDED RELEASE ORAL at 09:02

## 2020-07-01 RX ADMIN — GABAPENTIN 600 MG: 300 CAPSULE ORAL at 09:03

## 2020-07-01 RX ADMIN — ACETAMINOPHEN 650 MG: 325 TABLET ORAL at 14:18

## 2020-07-01 RX ADMIN — ISOSORBIDE MONONITRATE 90 MG: 60 TABLET, EXTENDED RELEASE ORAL at 09:02

## 2020-07-01 NOTE — ASSESSMENT & PLAN NOTE
· Observe off antibiotics since this is due to the BCG instillation itself and not due to an acute UTI  · Supportive care

## 2020-07-01 NOTE — PROGRESS NOTES
Progress Note - Infectious Disease   Roxi Ramos 66 y o  male MRN: [id number]  Unit/Bed#: E5 -01 Encounter: [id number]    Impression/Plan:  1  Abnormal lumbar spine CT  Patient's CT of the lumbar spine showed endplate irregularity from L1-L2  Unclear if this was same endplate edema of H9-C3 after his laminectomy that was seen on MRI in 1/2020  Patient completed a repeat MRI last night  I personally reviewed the results which showed evidence of progression and possible diskitis/osteomyelitis  There is a small fluid collection over the anterior L1-L2 region  Patient would benefit from culture, biopsy, and AFB given his ongoing BCG injections  Also consider malignancy given his ongoing bladder cancer  Patient is afebrile without leukocytosis  He is neurologically intact  Recommend holding additional antibiotics at this time while we attempt to collect more data  -monitor patient off additional antibiotics   -monitor CBC and BMP  -recommend IR aspiration/biopsy for culture, biopsy, and AFB  -monitor back pain     2  Dysuria  Patient reports his symptoms occurred after receiving his BCG injection last week  He describes this is a recurring problem with each BCG/cystoscopy procedure that he goes through  BCG injections are known to cause bladder pain and spasms  He has had recurring urine cultures which are negative but has repeatedly received antibiotic treatment despite these findings  I recommend the patient have conversations with his urology team at [facility] regarding his recurring symptoms and alternative ways for management as repeated antibiotic treatment puts patient at risk for resistant organisms and renal toxicities  Patient is afebrile without leukocytosis  New urine and blood cultures are preliminarily negative    No indication for ongoing antibiotic treatment at this time     -monitor patient off additional antibiotics  -monitor CBC and BMP  -follow-up blood cultures  -follow-up urine culture  -monitor  symptoms  -monitor urine output  -ongoing follow-up as an outpatient with [facility]     3  CKD stage 4  Upon review of patient's available past medical records it appears his baseline creatinine is generally 2 2-2 5  Patient's creatinine was 2 62 upon admission  -monitor BMP  -avoid nephrotoxins     4  Bladder cancer  He is currently in care at [facility]  Was initially diagnosed in 12 to [facility]  He underwent several TURBT and a few rounds of BCG  He followed up with annual endoscopic surveillance which had been negative until new findings were noted in July 2016  He then underwent additioanl TURBT and has had ongoing BCG+INF treatments since   -continue outpatient follow-up with [facility] after discharge    5  Diarrhea  Patient with multiple watery stools yesterday  C-diff PCR was sent and was negative  Consider the diarrhea may be side effect of recent broad spectrum antibiotics  He has no other GI complaints at this time and is tolerating oral intake without difficulty   -serial abdominal exams  -monitor GI symptoms  -monitor stool output    Above plan was discussed in detail with patient at the bedside  Above plan was discussed in detail with [facility] attending, Dr Dara Ivy  Antibiotics:  No current antibiotic use    Subjective:  Patient reports that he started having a lot of diarrhea yesterday  He denies abdominal pain, nausea, and vomiting  Reports he is tolerating PO intake without difficulty, was able to eat his whole lunch  He reports ongoing back pain which he reports is worse than the "chronic" pain he's been reporting to his [facility] team  He again discusses that since his laminectomy he's never felt normal  He still has fatigue with standing during ADLs but denies numbness/tingling in the legs/feet  He has no fever, chills, sweats, shakes; no cough, shortness of breath, or chest pain  No new symptoms      Objective:  Vitals:  Temp:  [97 5 °F (36 4 °C)-99 5 °F (37 5 °C)] 99 °F (37 2 °C)  HR:  [64-75] 73  Resp:  [18] 18  BP: (108-130)/(59-75) 130/75  SpO2:  [98 %-99 %] 98 %  Temp (24hrs), Av 7 °F (37 1 °C), Min:97 5 °F (36 4 °C), Max:99 5 °F (37 5 °C)  Current: Temperature: 99 °F (37 2 °C)    Physical Exam:   General Appearance:  Alert, interactive, nontoxic, in no acute distress  Appeared comfortable sitting out of bed in chair  Throat: Oropharynx moist without lesions  Lungs:   Clear to auscultation bilaterally; no wheezes, rhonchi or rales; respirations unlabored; patient is on room air   Heart:  RRR; no murmur, rub or gallop   Abdomen:   Soft, non-tender, non-distended, positive bowel sounds  Extremities: No clubbing or cyanosis, no edema   Skin: No new rashes, lesions, or draining wounds noted on exposed skin  Labs, Imaging, & Other studies:   All pertinent labs and imaging studies were personally reviewed  Results from last 7 days   Lab Units 20  0533 20  1504   WBC Thousand/uL 10 19* 9 49   HEMOGLOBIN g/dL 12 5 12 1   PLATELETS Thousands/uL 180 187     Results from last 7 days   Lab Units 20  0533 20  1504   POTASSIUM mmol/L 4 5 4 7   CHLORIDE mmol/L 105 104   CO2 mmol/L 19* 24   BUN mg/dL 41* 42*   CREATININE mg/dL 2 56* 2 62*   EGFR ml/min/1 73sq m 27 26   CALCIUM mg/dL 9 0 8 6   AST U/L 61* 50*   ALT U/L 100* 97*   ALK PHOS U/L 102 100     Results from last 7 days   Lab Units 20  1805 20  1917 20  1900 20  1507   BLOOD CULTURE   --  No Growth at 24 hrs   No Growth at 24 hrs   --    URINE CULTURE   --   --   --  No Growth <1000 cfu/mL   C DIFF TOXIN B  Negative  --   --   --

## 2020-07-01 NOTE — ASSESSMENT & PLAN NOTE
· MRI with diskitis and beginning osteomyelitis of L1-L2  · Discussed with ID  · Consult IR for aspiration, culture/cytology/g stain  · Pain control  · Serial exam

## 2020-07-01 NOTE — PROGRESS NOTES
Progress Note - Melburn Pill 1945, 66 y o  male MRN: [id number]    Unit/Bed#: E5 -01 Encounter: [id number]    Primary Care Provider: Jamin Sosa MD   Date and time admitted to hospital: 6/29/2020  1:59 PM        * Cystitis due to intravesical BCG administration  Assessment & Plan  · Observe off antibiotics since this is due to the BCG instillation itself and not due to an acute UTI  · Supportive care    Abnormal CT scan, lumbar spine  Assessment & Plan  · MRI with diskitis and beginning osteomyelitis of L1-L2  · Discussed with ID  · Consult IR for aspiration, culture/cytology/g stain  · Pain control  · Serial exam    Chronic kidney disease, stage 4 (severe) ([facility])  Assessment & Plan  Stable and at baseline  Monitor closely    Malignant tumor of urinary bladder [facility])  Assessment & Plan  Ongoing treatments through [facility] had a BCG treatment last Tuesday 6/23/19  Antibiotic-associated diarrhea  Assessment & Plan  Currently off antibiotics  C diff negative      Hypertension  Assessment & Plan  Continue metoprolol succinate 100 mg daily        VTE Pharmacologic Prophylaxis:   Pharmacologic: Heparin  Mechanical VTE Prophylaxis in Place: No    Patient Centered Rounds: I have performed bedside rounds with nursing staff today  Discussions with Specialists or Other Care Team Provider:  Id    Education and Discussions with Family / Patient:  Wife    Time Spent for Care: 25 minutes  More than 50% of total time spent on counseling and coordination of care as described above  Current Length of Stay: 2 day(s)    Current Patient Status: Inpatient   Certification Statement: The patient will continue to require additional inpatient hospital stay due to Diskitis osteomyelitis    Discharge Plan:   To be determined    Code Status: Level 1 - Full Code      Subjective:   + diarrhea  Intermittent back pain  No fever or chills  Denies weakness or numbness of the lower extremity    Objective:     Vitals: Temp (24hrs), Av 7 °F (37 1 °C), Min:97 5 °F (36 4 °C), Max:99 5 °F (37 5 °C)    Temp:  [97 5 °F (36 4 °C)-99 5 °F (37 5 °C)] 99 °F (37 2 °C)  HR:  [64-75] 73  Resp:  [18] 18  BP: (108-130)/(59-75) 130/75  SpO2:  [98 %-99 %] 98 %  Body mass index is 29 06 kg/m²  Input and Output Summary (last 24 hours): Intake/Output Summary (Last 24 hours) at 2020 1318  Last data filed at 2020 1201  Gross per 24 hour   Intake --   Output 750 ml   Net -750 ml       Physical Exam:     Physical Exam   Constitutional: He is oriented to person, place, and time  He appears well-developed  No distress  HENT:   Head: Normocephalic and atraumatic  Eyes: No scleral icterus  Neck: No JVD present  Cardiovascular: Regular rhythm  Exam reveals no gallop and no friction rub  No murmur heard  Pulmonary/Chest: Effort normal  No stridor  No respiratory distress  He has no wheezes  Abdominal: Soft  He exhibits no distension  There is no tenderness  There is no guarding  Musculoskeletal: He exhibits no edema  Neurological: He is alert and oriented to person, place, and time  Skin: Skin is warm and dry  He is not diaphoretic  Psychiatric: He has a normal mood and affect   His behavior is normal      Additional Data:     Labs:    Results from last 7 days   Lab Units 20  0533   WBC Thousand/uL 10 19*   HEMOGLOBIN g/dL 12 5   HEMATOCRIT % 37 2   PLATELETS Thousands/uL 180   NEUTROS PCT % 78*   LYMPHS PCT % 12*   MONOS PCT % 7   EOS PCT % 2     Results from last 7 days   Lab Units 20  0533   SODIUM mmol/L 137   POTASSIUM mmol/L 4 5   CHLORIDE mmol/L 105   CO2 mmol/L 19*   BUN mg/dL 41*   CREATININE mg/dL 2 56*   ANION GAP mmol/L 13   CALCIUM mg/dL 9 0   ALBUMIN g/dL 3 7   TOTAL BILIRUBIN mg/dL 0 38   ALK PHOS U/L 102   ALT U/L 100*   AST U/L 61*   GLUCOSE RANDOM mg/dL 116         Results from last 7 days   Lab Units 20  1134 20  0725 20  2110 20  1610 20  1328 06/30/20  1200 06/30/20  0725 06/29/20  2041   POC GLUCOSE mg/dl 127 84 104 146* 289* 197* 116 276*                   * I Have Reviewed All Lab Data Listed Above  * Additional Pertinent Lab Tests Reviewed: All Labs Within Last 24 Hours Reviewed    Imaging:    Imaging Reports Reviewed Today Include:  MRI spine      Recent Cultures (last 7 days):     Results from last 7 days   Lab Units 06/30/20  1805 06/29/20  1917 06/29/20  1900 06/29/20  1507   BLOOD CULTURE   --  No Growth at 24 hrs  No Growth at 24 hrs   --    URINE CULTURE   --   --   --  No Growth <1000 cfu/mL   C DIFF TOXIN B  Negative  --   --   --        Last 24 Hours Medication List:     Current Facility-Administered Medications:  acetaminophen 650 mg Oral Q6H PRN Lynda Piger, PA-C   ALPRAZolam 0 25 mg Oral BID PRN Marzevone Dean, PA-C   aspirin 81 mg Oral Daily Lynda Piger, PA-C   bimatoprost 1 drop Both Eyes HS Lynda Piger, PA-C   doxylamine 25 mg Oral HS PRN Marzevone Dean, PA-C   gabapentin 600 mg Oral Daily Lynda Piger, PA-C   heparin (porcine) 5,000 Units Subcutaneous Q8H [address] Lynda Piger, PA-C   insulin glargine 30 Units Subcutaneous HS Lynda Piger, PA-C   insulin lispro 1-6 Units Subcutaneous TID AC Lynda Piger, PA-C   insulin lispro 1-6 Units Subcutaneous HS Lynda Piger, PA-C   insulin lispro 10 Units Subcutaneous TID With Meals Lavinia Moran, PA-C   isosorbide mononitrate 90 mg Oral Daily Lynda Piger, PA-C   metoprolol succinate 100 mg Oral Daily Lynda Piger, PA-C   ondansetron 4 mg Intravenous Q6H PRN Lynda Piger, PA-C   pantoprazole 40 mg Oral Early Morning Lynda Piger, PA-C   sertraline 50 mg Oral Daily Marzemadai Moran, PA-C        Today, Patient Was Seen By: Nancy Olmos MD    ** Please Note: Dictation voice to text software may have been used in the creation of this document   **

## 2020-07-01 NOTE — PLAN OF CARE
Problem: PAIN - ADULT  Goal: Verbalizes/displays adequate comfort level or baseline comfort level  Description  Interventions:  - Encourage patient to monitor pain and request assistance  - Assess pain using appropriate pain scale  - Administer analgesics based on type and severity of pain and evaluate response  - Implement non-pharmacological measures as appropriate and evaluate response  - Consider cultural and social influences on pain and pain management  - Notify physician/advanced practitioner if interventions unsuccessful or patient reports new pain  Outcome: Progressing     Problem: INFECTION - ADULT  Goal: Absence or prevention of progression during hospitalization  Description  INTERVENTIONS:  - Assess and monitor for signs and symptoms of infection  - Monitor lab/diagnostic results  - Monitor all insertion sites, i e  indwelling lines, tubes, and drains  - Monitor endotracheal if appropriate and nasal secretions for changes in amount and color  - [place] appropriate cooling/warming therapies per order  - Administer medications as ordered  - Instruct and encourage patient and family to use good hand hygiene technique  - Identify and instruct in appropriate isolation precautions for identified infection/condition  Outcome: Progressing  Goal: Absence of fever/infection during neutropenic period  Description  INTERVENTIONS:  - Monitor WBC    Outcome: Progressing     Problem: SAFETY ADULT  Goal: Patient will remain free of falls  Description  INTERVENTIONS:  - Assess patient frequently for physical needs  -  Identify cognitive and physical deficits and behaviors that affect risk of falls    -  [place] fall precautions as indicated by assessment   - Educate patient/family on patient safety including physical limitations  - Instruct patient to call for assistance with activity based on assessment  - Modify environment to reduce risk of injury  - Consider OT/PT consult to assist with strengthening/mobility  Outcome: Progressing  Goal: Maintain or return to baseline ADL function  Description  INTERVENTIONS:  -  Assess patient's ability to carry out ADLs; assess patient's baseline for ADL function and identify physical deficits which impact ability to perform ADLs (bathing, care of mouth/teeth, toileting, grooming, dressing, etc )  - Assess/evaluate cause of self-care deficits   - Assess range of motion  - Assess patient's mobility; develop plan if impaired  - Assess patient's need for assistive devices and provide as appropriate  - Encourage maximum independence but intervene and supervise when necessary  - Involve family in performance of ADLs  - Assess for home care needs following discharge   - Consider OT consult to assist with ADL evaluation and planning for discharge  - Provide patient education as appropriate  Outcome: Progressing  Goal: Maintain or return mobility status to optimal level  Description  INTERVENTIONS:  - Assess patient's baseline mobility status (ambulation, transfers, stairs, etc )    - Identify cognitive and physical deficits and behaviors that affect mobility  - Identify mobility aids required to assist with transfers and/or ambulation (gait belt, sit-to-stand, lift, walker, cane, etc )  - [place] fall precautions as indicated by assessment  - Record patient progress and toleration of activity level on Mobility SBAR; progress patient to next Phase/Stage  - Instruct patient to call for assistance with activity based on assessment  - Consider rehabilitation consult to assist with strengthening/weightbearing, etc   Outcome: Progressing     Problem: DISCHARGE PLANNING  Goal: Discharge to home or other facility with appropriate resources  Description  INTERVENTIONS:  - Identify barriers to discharge w/patient and caregiver  - Arrange for needed discharge resources and transportation as appropriate  - Identify discharge learning needs (meds, wound care, etc )  - Arrange for interpretive services to assist at discharge as needed  - Refer to Case Management Department for coordinating discharge planning if the patient needs post-hospital services based on physician/advanced practitioner order or complex needs related to functional status, cognitive ability, or social support system  Outcome: Progressing     Problem: Knowledge Deficit  Goal: Patient/family/caregiver demonstrates understanding of disease process, treatment plan, medications, and discharge instructions  Description  Complete learning assessment and assess knowledge base  Interventions:  - Provide teaching at level of understanding  - Provide teaching via preferred learning methods  Outcome: Progressing     Problem: Potential for Falls  Goal: Patient will remain free of falls  Description  INTERVENTIONS:  - Assess patient frequently for physical needs  -  Identify cognitive and physical deficits and behaviors that affect risk of falls    -  [place] fall precautions as indicated by assessment   - Educate patient/family on patient safety including physical limitations  - Instruct patient to call for assistance with activity based on assessment  - Modify environment to reduce risk of injury  - Consider OT/PT consult to assist with strengthening/mobility  Outcome: Progressing

## 2020-07-02 ENCOUNTER — APPOINTMENT (INPATIENT)
Dept: RADIOLOGY | Facility: HOSPITAL | Age: 75
DRG: 629 | End: 2020-07-02
Attending: INTERNAL MEDICINE
Payer: MEDICARE

## 2020-07-02 PROBLEM — M46.46 DISCITIS OF LUMBAR REGION: Status: ACTIVE | Noted: 2020-06-29

## 2020-07-02 LAB
ANION GAP SERPL CALCULATED.3IONS-SCNC: 10 MMOL/L (ref 4–13)
BASOPHILS # BLD AUTO: 0.04 THOUSANDS/ΜL (ref 0–0.1)
BASOPHILS NFR BLD AUTO: 1 % (ref 0–1)
BUN SERPL-MCNC: 41 MG/DL (ref 5–25)
CALCIUM SERPL-MCNC: 8.8 MG/DL (ref 8.3–10.1)
CHLORIDE SERPL-SCNC: 105 MMOL/L (ref 100–108)
CO2 SERPL-SCNC: 23 MMOL/L (ref 21–32)
CREAT SERPL-MCNC: 2.42 MG/DL (ref 0.6–1.3)
CRP SERPL QL: 9.9 MG/L
EOSINOPHIL # BLD AUTO: 0.21 THOUSAND/ΜL (ref 0–0.61)
EOSINOPHIL NFR BLD AUTO: 2 % (ref 0–6)
ERYTHROCYTE [DISTWIDTH] IN BLOOD BY AUTOMATED COUNT: 13.3 % (ref 11.6–15.1)
ERYTHROCYTE [SEDIMENTATION RATE] IN BLOOD: 23 MM/HOUR (ref 1–20)
GFR SERPL CREATININE-BSD FRML MDRD: 29 ML/MIN/1.73SQ M
GLUCOSE SERPL-MCNC: 191 MG/DL (ref 65–140)
GLUCOSE SERPL-MCNC: 270 MG/DL (ref 65–140)
GLUCOSE SERPL-MCNC: 390 MG/DL (ref 65–140)
GLUCOSE SERPL-MCNC: 402 MG/DL (ref 65–140)
GLUCOSE SERPL-MCNC: 431 MG/DL (ref 65–140)
GLUCOSE SERPL-MCNC: 90 MG/DL (ref 65–140)
GLUCOSE SERPL-MCNC: 91 MG/DL (ref 65–140)
GLUCOSE SERPL-MCNC: 97 MG/DL (ref 65–140)
HCT VFR BLD AUTO: 33.1 % (ref 36.5–49.3)
HGB BLD-MCNC: 11.1 G/DL (ref 12–17)
IMM GRANULOCYTES # BLD AUTO: 0.04 THOUSAND/UL (ref 0–0.2)
IMM GRANULOCYTES NFR BLD AUTO: 1 % (ref 0–2)
LYMPHOCYTES # BLD AUTO: 2.32 THOUSANDS/ΜL (ref 0.6–4.47)
LYMPHOCYTES NFR BLD AUTO: 27 % (ref 14–44)
MCH RBC QN AUTO: 31.4 PG (ref 26.8–34.3)
MCHC RBC AUTO-ENTMCNC: 33.5 G/DL (ref 31.4–37.4)
MCV RBC AUTO: 94 FL (ref 82–98)
MONOCYTES # BLD AUTO: 0.72 THOUSAND/ΜL (ref 0.17–1.22)
MONOCYTES NFR BLD AUTO: 8 % (ref 4–12)
NEUTROPHILS # BLD AUTO: 5.26 THOUSANDS/ΜL (ref 1.85–7.62)
NEUTS SEG NFR BLD AUTO: 61 % (ref 43–75)
NRBC BLD AUTO-RTO: 0 /100 WBCS
PLATELET # BLD AUTO: 191 THOUSANDS/UL (ref 149–390)
PMV BLD AUTO: 11 FL (ref 8.9–12.7)
POTASSIUM SERPL-SCNC: 4.3 MMOL/L (ref 3.5–5.3)
RBC # BLD AUTO: 3.53 MILLION/UL (ref 3.88–5.62)
SODIUM SERPL-SCNC: 138 MMOL/L (ref 136–145)
WBC # BLD AUTO: 8.59 THOUSAND/UL (ref 4.31–10.16)

## 2020-07-02 PROCEDURE — 87102 FUNGUS ISOLATION CULTURE: CPT | Performed by: INTERNAL MEDICINE

## 2020-07-02 PROCEDURE — 87116 MYCOBACTERIA CULTURE: CPT | Performed by: NURSE PRACTITIONER

## 2020-07-02 PROCEDURE — 99232 SBSQ HOSP IP/OBS MODERATE 35: CPT | Performed by: INTERNAL MEDICINE

## 2020-07-02 PROCEDURE — 80048 BASIC METABOLIC PNL TOTAL CA: CPT | Performed by: NURSE PRACTITIONER

## 2020-07-02 PROCEDURE — 62267 INTERDISCAL PERQ ASPIR DX: CPT

## 2020-07-02 PROCEDURE — 82948 REAGENT STRIP/BLOOD GLUCOSE: CPT

## 2020-07-02 PROCEDURE — 62267 INTERDISCAL PERQ ASPIR DX: CPT | Performed by: RADIOLOGY

## 2020-07-02 PROCEDURE — 99152 MOD SED SAME PHYS/QHP 5/>YRS: CPT | Performed by: RADIOLOGY

## 2020-07-02 PROCEDURE — 87205 SMEAR GRAM STAIN: CPT | Performed by: NURSE PRACTITIONER

## 2020-07-02 PROCEDURE — 85025 COMPLETE CBC W/AUTO DIFF WBC: CPT | Performed by: NURSE PRACTITIONER

## 2020-07-02 PROCEDURE — 0Q903ZX DRAINAGE OF LUMBAR VERTEBRA, PERCUTANEOUS APPROACH, DIAGNOSTIC: ICD-10-PCS | Performed by: INTERNAL MEDICINE

## 2020-07-02 PROCEDURE — 77003 FLUOROGUIDE FOR SPINE INJECT: CPT | Performed by: RADIOLOGY

## 2020-07-02 PROCEDURE — 87070 CULTURE OTHR SPECIMN AEROBIC: CPT | Performed by: NURSE PRACTITIONER

## 2020-07-02 PROCEDURE — 99152 MOD SED SAME PHYS/QHP 5/>YRS: CPT

## 2020-07-02 PROCEDURE — 99153 MOD SED SAME PHYS/QHP EA: CPT

## 2020-07-02 PROCEDURE — 85652 RBC SED RATE AUTOMATED: CPT | Performed by: NURSE PRACTITIONER

## 2020-07-02 PROCEDURE — 87206 SMEAR FLUORESCENT/ACID STAI: CPT | Performed by: NURSE PRACTITIONER

## 2020-07-02 PROCEDURE — 86140 C-REACTIVE PROTEIN: CPT | Performed by: NURSE PRACTITIONER

## 2020-07-02 RX ORDER — MIDAZOLAM HYDROCHLORIDE 2 MG/2ML
INJECTION, SOLUTION INTRAMUSCULAR; INTRAVENOUS CODE/TRAUMA/SEDATION MEDICATION
Status: COMPLETED | OUTPATIENT
Start: 2020-07-02 | End: 2020-07-02

## 2020-07-02 RX ORDER — FENTANYL CITRATE 50 UG/ML
INJECTION, SOLUTION INTRAMUSCULAR; INTRAVENOUS CODE/TRAUMA/SEDATION MEDICATION
Status: COMPLETED | OUTPATIENT
Start: 2020-07-02 | End: 2020-07-02

## 2020-07-02 RX ADMIN — INSULIN LISPRO 10 UNITS: 100 INJECTION, SOLUTION INTRAVENOUS; SUBCUTANEOUS at 16:37

## 2020-07-02 RX ADMIN — ISOSORBIDE MONONITRATE 90 MG: 60 TABLET, EXTENDED RELEASE ORAL at 10:19

## 2020-07-02 RX ADMIN — INSULIN LISPRO 10 UNITS: 100 INJECTION, SOLUTION INTRAVENOUS; SUBCUTANEOUS at 11:38

## 2020-07-02 RX ADMIN — MIDAZOLAM 1 MG: 1 INJECTION INTRAMUSCULAR; INTRAVENOUS at 09:42

## 2020-07-02 RX ADMIN — INSULIN GLARGINE 30 UNITS: 100 INJECTION, SOLUTION SUBCUTANEOUS at 22:55

## 2020-07-02 RX ADMIN — GABAPENTIN 600 MG: 300 CAPSULE ORAL at 10:20

## 2020-07-02 RX ADMIN — FENTANYL CITRATE 50 MCG: 50 INJECTION, SOLUTION INTRAMUSCULAR; INTRAVENOUS at 09:42

## 2020-07-02 RX ADMIN — INSULIN LISPRO 2 UNITS: 100 INJECTION, SOLUTION INTRAVENOUS; SUBCUTANEOUS at 11:39

## 2020-07-02 RX ADMIN — FENTANYL CITRATE 50 MCG: 50 INJECTION, SOLUTION INTRAMUSCULAR; INTRAVENOUS at 09:48

## 2020-07-02 RX ADMIN — SERTRALINE HYDROCHLORIDE 50 MG: 50 TABLET ORAL at 10:21

## 2020-07-02 RX ADMIN — HEPARIN SODIUM 5000 UNITS: 5000 INJECTION INTRAVENOUS; SUBCUTANEOUS at 14:01

## 2020-07-02 RX ADMIN — INSULIN LISPRO 6 UNITS: 100 INJECTION, SOLUTION INTRAVENOUS; SUBCUTANEOUS at 16:36

## 2020-07-02 RX ADMIN — FENTANYL CITRATE 50 MCG: 50 INJECTION, SOLUTION INTRAMUSCULAR; INTRAVENOUS at 09:26

## 2020-07-02 RX ADMIN — METOPROLOL SUCCINATE 100 MG: 50 TABLET, EXTENDED RELEASE ORAL at 10:20

## 2020-07-02 RX ADMIN — BIMATOPROST 1 DROP: 0.1 SOLUTION/ DROPS OPHTHALMIC at 22:55

## 2020-07-02 RX ADMIN — MIDAZOLAM 1 MG: 1 INJECTION INTRAMUSCULAR; INTRAVENOUS at 09:26

## 2020-07-02 RX ADMIN — FENTANYL CITRATE 50 MCG: 50 INJECTION, SOLUTION INTRAMUSCULAR; INTRAVENOUS at 09:59

## 2020-07-02 NOTE — BRIEF OP NOTE (RAD/CATH)
IR IMAGE GUIDED BIOPSY/ASPIRATION Procedure Note    PATIENT NAME: Manuel Rodgers  : 1945  MRN: [id number]    Pre-op Diagnosis:   1  Dysuria    2  Renal insufficiency    3  Elevated LFTs    4  Failure of outpatient treatment    5  Abnormal CT scan    6  Abnormal CT scan, lumbar spine      Post-op Diagnosis:   1  Dysuria    2  Renal insufficiency    3  Elevated LFTs    4  Failure of outpatient treatment    5  Abnormal CT scan    6  Abnormal CT scan, lumbar spine        Surgeon:   Nael Monroe MD    Estimated Blood Loss:  Minimal  Findings:  Technically successful image guided aspiration of disc  Small fragments of disc material were removed and sent for cultures  The patient has a transitional lumbosacral junction and what was called L1-2 on MRI, I would call L2-3  Appropriate level was sampled following confirmation of level with diagnostic radiology       Specimens: disc fragments in preservative-free saline for Gram stain, culture and AFB    Complications:  None    Anesthesia: Conscious sedation    Nael Monroe MD     Date: 2020  Time: 10:48 AM

## 2020-07-02 NOTE — PLAN OF CARE
Problem: PAIN - ADULT  Goal: Verbalizes/displays adequate comfort level or baseline comfort level  Description  Interventions:  - Encourage patient to monitor pain and request assistance  - Assess pain using appropriate pain scale  - Administer analgesics based on type and severity of pain and evaluate response  - Implement non-pharmacological measures as appropriate and evaluate response  - Consider cultural and social influences on pain and pain management  - Notify physician/advanced practitioner if interventions unsuccessful or patient reports new pain  Outcome: Progressing     Problem: INFECTION - ADULT  Goal: Absence or prevention of progression during hospitalization  Description  INTERVENTIONS:  - Assess and monitor for signs and symptoms of infection  - Monitor lab/diagnostic results  - Monitor all insertion sites, i e  indwelling lines, tubes, and drains  - Monitor endotracheal if appropriate and nasal secretions for changes in amount and color  - [place] appropriate cooling/warming therapies per order  - Administer medications as ordered  - Instruct and encourage patient and family to use good hand hygiene technique  - Identify and instruct in appropriate isolation precautions for identified infection/condition  Outcome: Progressing  Goal: Absence of fever/infection during neutropenic period  Description  INTERVENTIONS:  - Monitor WBC    Outcome: Progressing     Problem: SAFETY ADULT  Goal: Patient will remain free of falls  Description  INTERVENTIONS:  - Assess patient frequently for physical needs  -  Identify cognitive and physical deficits and behaviors that affect risk of falls    -  [place] fall precautions as indicated by assessment   - Educate patient/family on patient safety including physical limitations  - Instruct patient to call for assistance with activity based on assessment  - Modify environment to reduce risk of injury  - Consider OT/PT consult to assist with strengthening/mobility  Outcome: Progressing  Goal: Maintain or return to baseline ADL function  Description  INTERVENTIONS:  -  Assess patient's ability to carry out ADLs; assess patient's baseline for ADL function and identify physical deficits which impact ability to perform ADLs (bathing, care of mouth/teeth, toileting, grooming, dressing, etc )  - Assess/evaluate cause of self-care deficits   - Assess range of motion  - Assess patient's mobility; develop plan if impaired  - Assess patient's need for assistive devices and provide as appropriate  - Encourage maximum independence but intervene and supervise when necessary  - Involve family in performance of ADLs  - Assess for home care needs following discharge   - Consider OT consult to assist with ADL evaluation and planning for discharge  - Provide patient education as appropriate  Outcome: Progressing  Goal: Maintain or return mobility status to optimal level  Description  INTERVENTIONS:  - Assess patient's baseline mobility status (ambulation, transfers, stairs, etc )    - Identify cognitive and physical deficits and behaviors that affect mobility  - Identify mobility aids required to assist with transfers and/or ambulation (gait belt, sit-to-stand, lift, walker, cane, etc )  - [place] fall precautions as indicated by assessment  - Record patient progress and toleration of activity level on Mobility SBAR; progress patient to next Phase/Stage  - Instruct patient to call for assistance with activity based on assessment  - Consider rehabilitation consult to assist with strengthening/weightbearing, etc   Outcome: Progressing     Problem: DISCHARGE PLANNING  Goal: Discharge to home or other facility with appropriate resources  Description  INTERVENTIONS:  - Identify barriers to discharge w/patient and caregiver  - Arrange for needed discharge resources and transportation as appropriate  - Identify discharge learning needs (meds, wound care, etc )  - Arrange for interpretive services to assist at discharge as needed  - Refer to Case Management Department for coordinating discharge planning if the patient needs post-hospital services based on physician/advanced practitioner order or complex needs related to functional status, cognitive ability, or social support system  Outcome: Progressing     Problem: Knowledge Deficit  Goal: Patient/family/caregiver demonstrates understanding of disease process, treatment plan, medications, and discharge instructions  Description  Complete learning assessment and assess knowledge base  Interventions:  - Provide teaching at level of understanding  - Provide teaching via preferred learning methods  Outcome: Progressing     Problem: Potential for Falls  Goal: Patient will remain free of falls  Description  INTERVENTIONS:  - Assess patient frequently for physical needs  -  Identify cognitive and physical deficits and behaviors that affect risk of falls    -  [place] fall precautions as indicated by assessment   - Educate patient/family on patient safety including physical limitations  - Instruct patient to call for assistance with activity based on assessment  - Modify environment to reduce risk of injury  - Consider OT/PT consult to assist with strengthening/mobility  Outcome: Progressing

## 2020-07-02 NOTE — PROGRESS NOTES
Progress Note - Infectious Disease   Manuel Rodgers 66 y o  male MRN: [id number]  Unit/Bed#: E5 -01 Encounter: [id number]      Impression/Plan:  1  Abnormal lumbar spine CT   Patient's CT of the lumbar spine showed endplate irregularity from L1-L2  Unclear if this was same endplate edema of C3-B2 JWDEVONTYREE his laminectomy that was seen on MRI in 1/2020  Patient completed a repeat MRI last night  Results showed evidence of progression and possible diskitis/osteomyelitis  There is a small fluid collection over the anterior L1-L2 region  Patient was seen in IR earlier today for culture, biopsy, and AFB   Patient is afebrile without leukocytosis   He is neurologically intact  Recommend holding additional antibiotics at this time while we await additional data  -monitor patient off additional antibiotics   -monitor CBC and BMP  -follow up IR aspiration/biopsy for culture, biopsy, and AFB  -monitor back pain     2  Dysuria   Patient reports his symptoms occurred after receiving his BCG injection last week  Chriscristela Vegamarisolkimberly describes this is a recurring problem with each BCG/cystoscopy procedure that he goes through   BCG injections are known to cause bladder pain and spasms   He has had recurring urine cultures which are negative but has repeatedly received antibiotic treatment despite these findings  I recommend the patient have conversations with his urology team at [facility] regarding his recurring symptoms and alternative ways for management as repeated antibiotic treatment puts patient at risk for resistant organisms and renal toxicities   Patient is afebrile without leukocytosis  New urine and blood cultures were negative  No indication for ongoing antibiotic treatment at this time     -monitor patient off additional antibiotics  -monitor CBC and BMP  -follow-up blood cultures  -monitor  symptoms  -monitor urine output  -ongoing follow-up as an outpatient with [facility]     3  CKD stage 4   Upon review of patient's available past medical records it appears his baseline creatinine is generally 2 2-2 5   Patient's creatinine was 2 62 upon admission  He is stable with creatinine of 2 42 today   -monitor BMP  -avoid nephrotoxins     4  Bladder cancer  Hussain Chen is currently in care at [address] initially diagnosed in  to [address]  He underwent several TURBT and a few rounds of BCG  He followed up with annual endoscopic surveillance which had been negative until new findings were noted in 2016  He then underwent additioanl TURBT and has had ongoing BCG+INF treatments since   -continue outpatient follow-up with [facility] after discharge     5  Diarrhea  Patient with multiple watery stools yesterday  C-diff PCR was sent and was negative  Consider the diarrhea may be side effect of recent broad spectrum antibiotics  Fortunately his diarrhea is improving  He has no other GI complaints at this time and is tolerating oral intake without difficulty   -serial abdominal exams  -monitor GI symptoms  -monitor stool output    Above plan was discussed in detail with patient at the bedside  Antibiotics:  No current antibiotic use    Subjective:  Patient reports he's a little sore at the site of his IR biopsy but is otherwise feeling good  He denies numbness/tingling in his legs  Reports he had no difficulty ambulating to his chair after he arrived back in his room  He has noticeable improvement in his diarrhea  No nausea, vomiting, or abdominal pain  He is eating/drinking without difficulty  He denies fevers, chills, sweats, shakes, cough, shortness of breath, or chest pain  No new symptoms      Objective:  Vitals:  Temp:  [97 3 °F (36 3 °C)-97 6 °F (36 4 °C)] 97 6 °F (36 4 °C)  HR:  [55-82] 80  Resp:  [18] 18  BP: (121-174)/(76-93) 134/89  SpO2:  [97 %-100 %] 97 %  Temp (24hrs), Av 5 °F (36 4 °C), Min:97 3 °F (36 3 °C), Max:97 6 °F (36 4 °C)  Current: Temperature: 97 6 °F (36 4 °C)    Physical Exam: General Appearance:  Alert, interactive, nontoxic, no acute distress  Appeared comfortable sitting out of bed in his chair  Throat: Oropharynx moist without lesions  Lungs:   Clear to auscultation bilaterally; no wheezes, rhonchi or rales; respirations unlabored; patient is on room air   Heart:  RRR; no murmur, rub or gallop   Abdomen:   Soft, non-tender, non-distended, positive bowel sounds  Extremities: No clubbing or cyanosis, no edema   Skin: No new rashes, lesions, or draining wounds noted on exposed skin  Labs, Imaging, & Other studies:   All pertinent labs and imaging studies were personally reviewed  Results from last 7 days   Lab Units 07/02/20  0507 06/30/20  0533 06/29/20  1504   WBC Thousand/uL 8 59 10 19* 9 49   HEMOGLOBIN g/dL 11 1* 12 5 12 1   PLATELETS Thousands/uL 191 180 187     Results from last 7 days   Lab Units 07/02/20  0507 06/30/20  0533 06/29/20  1504   POTASSIUM mmol/L 4 3 4 5 4 7   CHLORIDE mmol/L 105 105 104   CO2 mmol/L 23 19* 24   BUN mg/dL 41* 41* 42*   CREATININE mg/dL 2 42* 2 56* 2 62*   EGFR ml/min/1 73sq m 29 27 26   CALCIUM mg/dL 8 8 9 0 8 6   AST U/L  --  61* 50*   ALT U/L  --  100* 97*   ALK PHOS U/L  --  102 100     Results from last 7 days   Lab Units 06/30/20  1805 06/29/20  1917 06/29/20  1900 06/29/20  1507   BLOOD CULTURE   --  No Growth at 48 hrs  No Growth at 48 hrs    --    URINE CULTURE   --   --   --  No Growth <1000 cfu/mL   C DIFF TOXIN B  Negative  --   --   --          Results from last 7 days   Lab Units 07/02/20  0507   CRP mg/L 9 9*

## 2020-07-02 NOTE — PLAN OF CARE
Problem: PAIN - ADULT  Goal: Verbalizes/displays adequate comfort level or baseline comfort level  Description  Interventions:  - Encourage patient to monitor pain and request assistance  - Assess pain using appropriate pain scale  - Administer analgesics based on type and severity of pain and evaluate response  - Implement non-pharmacological measures as appropriate and evaluate response  - Consider cultural and social influences on pain and pain management  - Notify physician/advanced practitioner if interventions unsuccessful or patient reports new pain  Outcome: Progressing     Problem: INFECTION - ADULT  Goal: Absence or prevention of progression during hospitalization  Description  INTERVENTIONS:  - Assess and monitor for signs and symptoms of infection  - Monitor lab/diagnostic results  - Monitor all insertion sites, i e  indwelling lines, tubes, and drains  - Monitor endotracheal if appropriate and nasal secretions for changes in amount and color  - [place] appropriate cooling/warming therapies per order  - Administer medications as ordered  - Instruct and encourage patient and family to use good hand hygiene technique  - Identify and instruct in appropriate isolation precautions for identified infection/condition  Outcome: Progressing  Goal: Absence of fever/infection during neutropenic period  Description  INTERVENTIONS:  - Monitor WBC    Outcome: Progressing     Problem: SAFETY ADULT  Goal: Patient will remain free of falls  Description  INTERVENTIONS:  - Assess patient frequently for physical needs  -  Identify cognitive and physical deficits and behaviors that affect risk of falls    -  [place] fall precautions as indicated by assessment   - Educate patient/family on patient safety including physical limitations  - Instruct patient to call for assistance with activity based on assessment  - Modify environment to reduce risk of injury  - Consider OT/PT consult to assist with strengthening/mobility  Outcome: Progressing  Goal: Maintain or return to baseline ADL function  Description  INTERVENTIONS:  -  Assess patient's ability to carry out ADLs; assess patient's baseline for ADL function and identify physical deficits which impact ability to perform ADLs (bathing, care of mouth/teeth, toileting, grooming, dressing, etc )  - Assess/evaluate cause of self-care deficits   - Assess range of motion  - Assess patient's mobility; develop plan if impaired  - Assess patient's need for assistive devices and provide as appropriate  - Encourage maximum independence but intervene and supervise when necessary  - Involve family in performance of ADLs  - Assess for home care needs following discharge   - Consider OT consult to assist with ADL evaluation and planning for discharge  - Provide patient education as appropriate  Outcome: Progressing  Goal: Maintain or return mobility status to optimal level  Description  INTERVENTIONS:  - Assess patient's baseline mobility status (ambulation, transfers, stairs, etc )    - Identify cognitive and physical deficits and behaviors that affect mobility  - Identify mobility aids required to assist with transfers and/or ambulation (gait belt, sit-to-stand, lift, walker, cane, etc )  - [place] fall precautions as indicated by assessment  - Record patient progress and toleration of activity level on Mobility SBAR; progress patient to next Phase/Stage  - Instruct patient to call for assistance with activity based on assessment  - Consider rehabilitation consult to assist with strengthening/weightbearing, etc   Outcome: Progressing     Problem: DISCHARGE PLANNING  Goal: Discharge to home or other facility with appropriate resources  Description  INTERVENTIONS:  - Identify barriers to discharge w/patient and caregiver  - Arrange for needed discharge resources and transportation as appropriate  - Identify discharge learning needs (meds, wound care, etc )  - Arrange for interpretive services to assist at discharge as needed  - Refer to Case Management Department for coordinating discharge planning if the patient needs post-hospital services based on physician/advanced practitioner order or complex needs related to functional status, cognitive ability, or social support system  Outcome: Progressing     Problem: Knowledge Deficit  Goal: Patient/family/caregiver demonstrates understanding of disease process, treatment plan, medications, and discharge instructions  Description  Complete learning assessment and assess knowledge base  Interventions:  - Provide teaching at level of understanding  - Provide teaching via preferred learning methods  Outcome: Progressing     Problem: Potential for Falls  Goal: Patient will remain free of falls  Description  INTERVENTIONS:  - Assess patient frequently for physical needs  -  Identify cognitive and physical deficits and behaviors that affect risk of falls    -  [place] fall precautions as indicated by assessment   - Educate patient/family on patient safety including physical limitations  - Instruct patient to call for assistance with activity based on assessment  - Modify environment to reduce risk of injury  - Consider OT/PT consult to assist with strengthening/mobility  Outcome: Progressing

## 2020-07-02 NOTE — ASSESSMENT & PLAN NOTE
· CT was abnormal suggesting diskitis  · Follow-up MRI confirmed diskitis and beginning osteomyelitis of L1-L2  · Status post image guided aspiration by IR today  · Pain control  · Serial exam  · Follow-up results of aspiration  · Off antibiotics

## 2020-07-02 NOTE — ASSESSMENT & PLAN NOTE
Ongoing treatments through [address] [facility] had a BCG treatment last Tuesday 6/23/19

## 2020-07-02 NOTE — PROGRESS NOTES
Progress Note - Jermaine Smalls 1945, 66 y o  male MRN: [id number]    Unit/Bed#: E5 -01 Encounter: [id number]    Primary Care Provider: Kallie Bartholomew MD   Date and time admitted to hospital: 6/29/2020  1:59 PM        * Cystitis due to intravesical BCG administration  Assessment & Plan  · Observe off antibiotics since this is due to the BCG instillation itself and not due to an acute UTI  · Supportive care    Discitis of lumbar region  Assessment & Plan  · CT was abnormal suggesting diskitis  · Follow-up MRI confirmed diskitis and beginning osteomyelitis of L1-L2  · Status post image guided aspiration by IR today  · Pain control  · Serial exam  · Follow-up results of aspiration  · Off antibiotics    Chronic kidney disease, stage 4 (severe) (HCC)  Assessment & Plan  Stable and at baseline  Monitor closely    Malignant tumor of urinary bladder [facility])  Assessment & Plan  Ongoing treatments through [address] [facility] had a BCG treatment last Tuesday 6/23/19  Antibiotic-associated diarrhea  Assessment & Plan  Currently off antibiotics  C diff negative  May have Imodium as needed    Hypertension  Assessment & Plan  Continue metoprolol succinate 100 mg daily      VTE Pharmacologic Prophylaxis:   Pharmacologic: Heparin  Mechanical VTE Prophylaxis in Place: No    Patient Centered Rounds: I have performed bedside rounds with nursing staff today  Discussions with Specialists or Other Care Team Provider:  Id    Education and Discussions with Family / Patient:  Spoke with wife and gave update    Time Spent for Care: 25 minutes  More than 50% of total time spent on counseling and coordination of care as described above  Current Length of Stay: 3 day(s)    Current Patient Status: Inpatient   Certification Statement: The patient will continue to require additional inpatient hospital stay due to Diskitis/osteomyelitis    Discharge Plan:   To be determined    Code Status: Level 1 - Full Code    Subjective:   Denies pain after his procedure  Skin is still numb  Denies leg weakness or numbness  Improved diarrhea    Objective:     Vitals:   Temp (24hrs), Av 5 °F (36 4 °C), Min:97 3 °F (36 3 °C), Max:97 6 °F (36 4 °C)    Temp:  [97 3 °F (36 3 °C)-97 6 °F (36 4 °C)] 97 6 °F (36 4 °C)  HR:  [55-82] 80  Resp:  [18] 18  BP: (121-174)/(76-93) 134/89  SpO2:  [97 %-100 %] 97 %  Body mass index is 29 06 kg/m²  Input and Output Summary (last 24 hours):     No intake or output data in the 24 hours ending 20 1453    Physical Exam:     Physical Exam   Constitutional: He is oriented to person, place, and time  He appears well-developed  No distress  HENT:   Head: Normocephalic and atraumatic  Eyes: No scleral icterus  Neck: No JVD present  Cardiovascular: Regular rhythm  Exam reveals no gallop and no friction rub  No murmur heard  Pulmonary/Chest: Effort normal  No stridor  No respiratory distress  He has no wheezes  Abdominal: Soft  He exhibits no distension  There is no tenderness  There is no guarding  Musculoskeletal: He exhibits no edema or deformity  Neurological: He is alert and oriented to person, place, and time  Skin: Skin is warm and dry  He is not diaphoretic  Psychiatric: He has a normal mood and affect  His behavior is normal    Vitals reviewed      Additional Data:     Labs:    Results from last 7 days   Lab Units 20  0507   WBC Thousand/uL 8 59   HEMOGLOBIN g/dL 11 1*   HEMATOCRIT % 33 1*   PLATELETS Thousands/uL 191   NEUTROS PCT % 61   LYMPHS PCT % 27   MONOS PCT % 8   EOS PCT % 2     Results from last 7 days   Lab Units 20  0507 20  0533   SODIUM mmol/L 138 137   POTASSIUM mmol/L 4 3 4 5   CHLORIDE mmol/L 105 105   CO2 mmol/L 23 19*   BUN mg/dL 41* 41*   CREATININE mg/dL 2 42* 2 56*   ANION GAP mmol/L 10 13   CALCIUM mg/dL 8 8 9 0   ALBUMIN g/dL  --  3 7   TOTAL BILIRUBIN mg/dL  --  0 38   ALK PHOS U/L  --  102   ALT U/L  --  100*   AST U/L  --  61*   GLUCOSE RANDOM mg/dL 90 116         Results from last 7 days   Lab Units 07/02/20  1115 07/02/20  0802 07/01/20  2106 07/01/20  1555 07/01/20  1134 07/01/20  0725 06/30/20  2110 06/30/20  1610 06/30/20  1328 06/30/20  1200 06/30/20  0725 06/29/20  2041   POC GLUCOSE mg/dl 191* 97 138 152* 127 84 104 146* 289* 197* 116 276*                   * I Have Reviewed All Lab Data Listed Above  * Additional Pertinent Lab Tests Reviewed: All Labs Within Last 24 Hours Reviewed    Imaging:    Imaging Reports Reviewed Today Include:  None    Recent Cultures (last 7 days):     Results from last 7 days   Lab Units 07/02/20  1004 06/30/20  1805 06/29/20  1917 06/29/20  1900 06/29/20  1507   BLOOD CULTURE   --   --  No Growth at 48 hrs   No Growth at 48 hrs   --    GRAM STAIN RESULT  Rare Polys  No bacteria seen  --   --   --   --    URINE CULTURE   --   --   --   --  No Growth <1000 cfu/mL   C DIFF TOXIN B   --  Negative  --   --   --        Last 24 Hours Medication List:     Current Facility-Administered Medications:  acetaminophen 650 mg Oral Q6H PRN ARPITA Reynoso-C   ALPRAZolam 0 25 mg Oral BID PRN ARPITA Dhillon-MANUELITO   aspirin 81 mg Oral Daily Lynda Ang, PA-C   bimatoprost 1 drop Both Eyes HS Lynda Ang, PA-C   doxylamine 25 mg Oral HS PRN ARPITA Dhillon-C   gabapentin 600 mg Oral Daily Lynda Ang, PA-C   heparin (porcine) 5,000 Units Subcutaneous Q8H [address] Lynda Ang, PA-C   insulin glargine 30 Units Subcutaneous HS Lynda Ang, PA-C   insulin lispro 1-6 Units Subcutaneous TID AC Lynda Ang PA-C   insulin lispro 1-6 Units Subcutaneous HS Lynda Ang, PA-C   insulin lispro 10 Units Subcutaneous TID With Meals Lynda Ang PA-C   isosorbide mononitrate 90 mg Oral Daily ARPITA Reynoso-C   loperamide 2 mg Oral 4x Daily PRN Shahid Miller MD   metoprolol succinate 100 mg Oral Daily Lynda Ang PA-C   ondansetron 4 mg Intravenous Q6H PRN Lynda Ang PA-C   pantoprazole 40 mg Oral Early Morning Ivan Savage PA-C sertraline 50 mg Oral Daily Ana Alonso PA-C        Today, Patient Was Seen By: Yesika Trammell MD    ** Please Note: Dictation voice to text software may have been used in the creation of this document   **

## 2020-07-02 NOTE — PHYSICAL THERAPY NOTE
Physical Therapy Cancellation Note            Attempted to see pt for PT treatment interventions, however pt is currently on bedrest for aspiration of fluid and bx  Will attempt at a later time    Sofia Black PTA

## 2020-07-03 PROBLEM — R74.01 TRANSAMINITIS: Status: RESOLVED | Noted: 2020-06-29 | Resolved: 2020-07-03

## 2020-07-03 LAB
ALBUMIN SERPL BCP-MCNC: 3.2 G/DL (ref 3.5–5)
ALP SERPL-CCNC: 84 U/L (ref 46–116)
ALT SERPL W P-5'-P-CCNC: 67 U/L (ref 12–78)
ANION GAP SERPL CALCULATED.3IONS-SCNC: 11 MMOL/L (ref 4–13)
AST SERPL W P-5'-P-CCNC: 38 U/L (ref 5–45)
BASOPHILS # BLD AUTO: 0.04 THOUSANDS/ΜL (ref 0–0.1)
BASOPHILS NFR BLD AUTO: 1 % (ref 0–1)
BILIRUB SERPL-MCNC: 0.3 MG/DL (ref 0.2–1)
BUN SERPL-MCNC: 44 MG/DL (ref 5–25)
CALCIUM SERPL-MCNC: 8.6 MG/DL (ref 8.3–10.1)
CHLORIDE SERPL-SCNC: 105 MMOL/L (ref 100–108)
CO2 SERPL-SCNC: 21 MMOL/L (ref 21–32)
CREAT SERPL-MCNC: 2.33 MG/DL (ref 0.6–1.3)
EOSINOPHIL # BLD AUTO: 0.18 THOUSAND/ΜL (ref 0–0.61)
EOSINOPHIL NFR BLD AUTO: 2 % (ref 0–6)
ERYTHROCYTE [DISTWIDTH] IN BLOOD BY AUTOMATED COUNT: 13.2 % (ref 11.6–15.1)
GFR SERPL CREATININE-BSD FRML MDRD: 30 ML/MIN/1.73SQ M
GLUCOSE SERPL-MCNC: 102 MG/DL (ref 65–140)
GLUCOSE SERPL-MCNC: 116 MG/DL (ref 65–140)
GLUCOSE SERPL-MCNC: 116 MG/DL (ref 65–140)
GLUCOSE SERPL-MCNC: 206 MG/DL (ref 65–140)
GLUCOSE SERPL-MCNC: 65 MG/DL (ref 65–140)
HCT VFR BLD AUTO: 31.1 % (ref 36.5–49.3)
HGB BLD-MCNC: 10.4 G/DL (ref 12–17)
IMM GRANULOCYTES # BLD AUTO: 0.05 THOUSAND/UL (ref 0–0.2)
IMM GRANULOCYTES NFR BLD AUTO: 1 % (ref 0–2)
LYMPHOCYTES # BLD AUTO: 2 THOUSANDS/ΜL (ref 0.6–4.47)
LYMPHOCYTES NFR BLD AUTO: 26 % (ref 14–44)
MCH RBC QN AUTO: 31.4 PG (ref 26.8–34.3)
MCHC RBC AUTO-ENTMCNC: 33.4 G/DL (ref 31.4–37.4)
MCV RBC AUTO: 94 FL (ref 82–98)
MONOCYTES # BLD AUTO: 0.72 THOUSAND/ΜL (ref 0.17–1.22)
MONOCYTES NFR BLD AUTO: 9 % (ref 4–12)
NEUTROPHILS # BLD AUTO: 4.79 THOUSANDS/ΜL (ref 1.85–7.62)
NEUTS SEG NFR BLD AUTO: 61 % (ref 43–75)
NRBC BLD AUTO-RTO: 0 /100 WBCS
PLATELET # BLD AUTO: 173 THOUSANDS/UL (ref 149–390)
PMV BLD AUTO: 11 FL (ref 8.9–12.7)
POTASSIUM SERPL-SCNC: 4.3 MMOL/L (ref 3.5–5.3)
PROT SERPL-MCNC: 6.8 G/DL (ref 6.4–8.2)
RBC # BLD AUTO: 3.31 MILLION/UL (ref 3.88–5.62)
SODIUM SERPL-SCNC: 137 MMOL/L (ref 136–145)
WBC # BLD AUTO: 7.78 THOUSAND/UL (ref 4.31–10.16)

## 2020-07-03 PROCEDURE — 85025 COMPLETE CBC W/AUTO DIFF WBC: CPT | Performed by: INTERNAL MEDICINE

## 2020-07-03 PROCEDURE — 99232 SBSQ HOSP IP/OBS MODERATE 35: CPT | Performed by: INTERNAL MEDICINE

## 2020-07-03 PROCEDURE — 82948 REAGENT STRIP/BLOOD GLUCOSE: CPT

## 2020-07-03 PROCEDURE — 80053 COMPREHEN METABOLIC PANEL: CPT | Performed by: INTERNAL MEDICINE

## 2020-07-03 RX ORDER — OXYCODONE HYDROCHLORIDE 5 MG/1
5 TABLET ORAL EVERY 6 HOURS PRN
Status: DISCONTINUED | OUTPATIENT
Start: 2020-07-03 | End: 2020-07-08 | Stop reason: HOSPADM

## 2020-07-03 RX ORDER — DOCUSATE SODIUM 100 MG/1
100 CAPSULE, LIQUID FILLED ORAL DAILY
Status: DISCONTINUED | OUTPATIENT
Start: 2020-07-03 | End: 2020-07-08 | Stop reason: HOSPADM

## 2020-07-03 RX ADMIN — ASPIRIN 81 MG 81 MG: 81 TABLET ORAL at 08:20

## 2020-07-03 RX ADMIN — DOCUSATE SODIUM 100 MG: 100 CAPSULE, LIQUID FILLED ORAL at 12:50

## 2020-07-03 RX ADMIN — OXYCODONE HYDROCHLORIDE 5 MG: 5 TABLET ORAL at 12:50

## 2020-07-03 RX ADMIN — PANTOPRAZOLE SODIUM 40 MG: 40 TABLET, DELAYED RELEASE ORAL at 05:30

## 2020-07-03 RX ADMIN — INSULIN LISPRO 10 UNITS: 100 INJECTION, SOLUTION INTRAVENOUS; SUBCUTANEOUS at 12:08

## 2020-07-03 RX ADMIN — OXYCODONE HYDROCHLORIDE 5 MG: 5 TABLET ORAL at 23:30

## 2020-07-03 RX ADMIN — HEPARIN SODIUM 5000 UNITS: 5000 INJECTION INTRAVENOUS; SUBCUTANEOUS at 13:45

## 2020-07-03 RX ADMIN — DOXYLAMINE SUCCINATE 25 MG: 25 TABLET ORAL at 21:39

## 2020-07-03 RX ADMIN — INSULIN LISPRO 2 UNITS: 100 INJECTION, SOLUTION INTRAVENOUS; SUBCUTANEOUS at 21:38

## 2020-07-03 RX ADMIN — GABAPENTIN 600 MG: 300 CAPSULE ORAL at 08:20

## 2020-07-03 RX ADMIN — METOPROLOL SUCCINATE 100 MG: 50 TABLET, EXTENDED RELEASE ORAL at 08:20

## 2020-07-03 RX ADMIN — HEPARIN SODIUM 5000 UNITS: 5000 INJECTION INTRAVENOUS; SUBCUTANEOUS at 21:39

## 2020-07-03 RX ADMIN — SERTRALINE HYDROCHLORIDE 50 MG: 50 TABLET ORAL at 08:20

## 2020-07-03 RX ADMIN — HEPARIN SODIUM 5000 UNITS: 5000 INJECTION INTRAVENOUS; SUBCUTANEOUS at 05:29

## 2020-07-03 RX ADMIN — INSULIN GLARGINE 30 UNITS: 100 INJECTION, SOLUTION SUBCUTANEOUS at 21:38

## 2020-07-03 RX ADMIN — ISOSORBIDE MONONITRATE 90 MG: 60 TABLET, EXTENDED RELEASE ORAL at 08:19

## 2020-07-03 RX ADMIN — BIMATOPROST 1 DROP: 0.1 SOLUTION/ DROPS OPHTHALMIC at 21:39

## 2020-07-03 RX ADMIN — INSULIN LISPRO 10 UNITS: 100 INJECTION, SOLUTION INTRAVENOUS; SUBCUTANEOUS at 08:19

## 2020-07-03 NOTE — PROGRESS NOTES
Progress Note - Hetal Mosqueda 1945, 66 y o  male MRN: [id number]    Unit/Bed#: E5 -01 Encounter: [id number]    Primary Care Provider: Tonja Mike MD   Date and time admitted to hospital: 6/29/2020  1:59 PM        * Cystitis due to intravesical BCG administration  Assessment & Plan  · Observe off antibiotics since this is due to BCG instillation itself and not due to acute infection  · Supportive care    Discitis of lumbar region  Assessment & Plan  · CT was abnormal suggesting diskitis  · Follow-up MRI confirmed diskitis and beginning osteomyelitis of L1-L2  · Postop day 1 Status post image guided aspiration by IR today  · Add p r n  Oxycodone for Pain control  · Serial exam  · Follow-up results of aspiration  · Off antibiotics    Chronic kidney disease, stage 4 (severe) (HCC)  Assessment & Plan  Stable and at baseline  Monitor closely    Malignant tumor of urinary bladder [facility])  Assessment & Plan  Ongoing treatments through [address] [facility] had a BCG treatment last Tuesday 6/23/19  Antibiotic-associated diarrhea  Assessment & Plan  Currently off antibiotics  C diff negative  Resolved  Now constipated    Presence of stent in coronary artery  Assessment & Plan  Reports history of stenting November 2019  Reports he inquired about being taken off of Plavix  Debra Rene was had with Dr Madie Smith of Cardiology at [facility]- Patient is aware of the risk of stent thrombosis  Essential hypertension  Assessment & Plan  Continue Toprol  mg daily daily with hold parameters    Transaminitis-resolved as of 7/3/2020  Assessment & Plan  Mild  Probably related to malignancy and chemo  Resolved        VTE Pharmacologic Prophylaxis:   Pharmacologic: Enoxaparin (Lovenox)  Mechanical VTE Prophylaxis in Place: No    Patient Centered Rounds: I have performed bedside rounds with nursing staff today      Education and Discussions with Family / Patient:  Spoke with wife and gave update    Time Spent for Care: 25 minutes  More than 50% of total time spent on counseling and coordination of care as described above  Current Length of Stay: 4 day(s)    Current Patient Status: Inpatient   Certification Statement: The patient will continue to require additional inpatient hospital stay due to Possible diskitis and osteomyelitis of the lumbar spine    Discharge Plan:  Not ready for discharge    Code Status: Level 1 - Full Code      Subjective:   Low back pain from the site of aspiration  No fever or chills  Intermittent dysuria  No weakness or numbness of the legs    Objective:     Vitals:   Temp (24hrs), Av 4 °F (36 3 °C), Min:97 1 °F (36 2 °C), Max:97 7 °F (36 5 °C)    Temp:  [97 1 °F (36 2 °C)-97 7 °F (36 5 °C)] 97 1 °F (36 2 °C)  HR:  [60-71] 60  Resp:  [18] 18  BP: (115-136)/(68-83) 136/83  SpO2:  [99 %] 99 %  Body mass index is 29 06 kg/m²  Input and Output Summary (last 24 hours): Intake/Output Summary (Last 24 hours) at 7/3/2020 1130  Last data filed at 7/3/2020 0801  Gross per 24 hour   Intake 660 ml   Output --   Net 660 ml       Physical Exam:     Physical Exam   Constitutional: He is oriented to person, place, and time  He appears well-developed  No distress  HENT:   Head: Normocephalic and atraumatic  Eyes: No scleral icterus  Neck: No JVD present  Cardiovascular: Regular rhythm  Exam reveals no gallop and no friction rub  No murmur heard  Pulmonary/Chest: Effort normal  No stridor  No respiratory distress  He has no wheezes  Abdominal: Soft  He exhibits no distension  There is no tenderness  There is no guarding  Musculoskeletal: He exhibits deformity  He exhibits no edema  Neurological: He is alert and oriented to person, place, and time  Skin: Skin is warm and dry  He is not diaphoretic  Psychiatric: He has a normal mood and affect  His behavior is normal    Vitals reviewed        Additional Data:     Labs:    Results from last 7 days   Lab Units 20  0434   WBC Thousand/uL 7 78   HEMOGLOBIN g/dL 10 4*   HEMATOCRIT % 31 1*   PLATELETS Thousands/uL 173   NEUTROS PCT % 61   LYMPHS PCT % 26   MONOS PCT % 9   EOS PCT % 2     Results from last 7 days   Lab Units 07/03/20  0434   SODIUM mmol/L 137   POTASSIUM mmol/L 4 3   CHLORIDE mmol/L 105   CO2 mmol/L 21   BUN mg/dL 44*   CREATININE mg/dL 2 33*   ANION GAP mmol/L 11   CALCIUM mg/dL 8 6   ALBUMIN g/dL 3 2*   TOTAL BILIRUBIN mg/dL 0 30   ALK PHOS U/L 84   ALT U/L 67   AST U/L 38   GLUCOSE RANDOM mg/dL 116         Results from last 7 days   Lab Units 07/03/20  1109 07/03/20  0731 07/02/20  2052 07/02/20  1758 07/02/20  1716 07/02/20  1608 07/02/20  1533 07/02/20  1115 07/02/20  0802 07/01/20  2106 07/01/20  1555 07/01/20  1134   POC GLUCOSE mg/dl 116 102 91 270* 390* 431* 402* 191* 97 138 152* 127             * I Have Reviewed All Lab Data Listed Above  * Additional Pertinent Lab Tests Reviewed: All Labs Within Last 24 Hours Reviewed    Imaging:    Imaging Reports Reviewed Today Include:  None  Recent Cultures (last 7 days):     Results from last 7 days   Lab Units 07/02/20  1004 06/30/20  1805 06/29/20  1917 06/29/20  1900 06/29/20  1507   BLOOD CULTURE   --   --  No Growth at 72 hrs   No Growth at 72 hrs   --    GRAM STAIN RESULT  Rare Polys  No bacteria seen  --   --   --   --    URINE CULTURE   --   --   --   --  No Growth <1000 cfu/mL   C DIFF TOXIN B   --  Negative  --   --   --        Last 24 Hours Medication List:     Current Facility-Administered Medications:  acetaminophen 650 mg Oral Q6H PRN Lynda Ang PA-C   ALPRAZolam 0 25 mg Oral BID PRN Sonia Parker PA-C   aspirin 81 mg Oral Daily Lynda Ang PA-C   bimatoprost 1 drop Both Eyes HS Lynda Ang PA-C   docusate sodium 100 mg Oral Daily Ar Solomon MD   doxylamine 25 mg Oral HS PRN Sonia Parker PA-C   gabapentin 600 mg Oral Daily Lynda Ang PA-C   heparin (porcine) 5,000 Units Subcutaneous Q8H [address] Lynda Ang PA-C   insulin glargine 30 Units Subcutaneous HS Lynda Piger, PA-C   insulin lispro 1-6 Units Subcutaneous TID AC Lynda Piger, PA-C   insulin lispro 1-6 Units Subcutaneous HS Lynda Piger, PA-C   insulin lispro 10 Units Subcutaneous TID With [employer]als [employer] Yomi, PA-MANUELITO   isosorbide mononitrate 90 mg Oral Daily Lynda Mcraer, PA-C   metoprolol succinate 100 mg Oral Daily Lynda Ang, PA-C   ondansetron 4 mg Intravenous Q6H PRN Upside BO Celaya   oxyCODONE 5 mg Oral Q6H PRN Sander Benz MD   pantoprazole 40 mg Oral Early Morning Lynda Ang, PA-MANUELITO   sertraline 50 mg Oral Daily U[place]ide Yomi, BO        Today, Patient Was Seen By: Sander Benz MD    ** Please Note: Dictation voice to text software may have been used in the creation of this document   **

## 2020-07-03 NOTE — ASSESSMENT & PLAN NOTE
Reports history of stenting November 2019  Reports he inquired about being taken off of Plavix  Michael Phillips was had with Dr Galicia  of Cardiology at [facility]- Patient is aware of the risk of stent thrombosis

## 2020-07-03 NOTE — PLAN OF CARE
Problem: PAIN - ADULT  Goal: Verbalizes/displays adequate comfort level or baseline comfort level  Description  Interventions:  - Encourage patient to monitor pain and request assistance  - Assess pain using appropriate pain scale  - Administer analgesics based on type and severity of pain and evaluate response  - Implement non-pharmacological measures as appropriate and evaluate response  - Consider cultural and social influences on pain and pain management  - Notify physician/advanced practitioner if interventions unsuccessful or patient reports new pain  Outcome: Progressing     Problem: INFECTION - ADULT  Goal: Absence or prevention of progression during hospitalization  Description  INTERVENTIONS:  - Assess and monitor for signs and symptoms of infection  - Monitor lab/diagnostic results  - Monitor all insertion sites, i e  indwelling lines, tubes, and drains  - Monitor endotracheal if appropriate and nasal secretions for changes in amount and color  - [place] appropriate cooling/warming therapies per order  - Administer medications as ordered  - Instruct and encourage patient and family to use good hand hygiene technique  - Identify and instruct in appropriate isolation precautions for identified infection/condition  Outcome: Progressing  Goal: Absence of fever/infection during neutropenic period  Description  INTERVENTIONS:  - Monitor WBC    Outcome: Progressing     Problem: SAFETY ADULT  Goal: Patient will remain free of falls  Description  INTERVENTIONS:  - Assess patient frequently for physical needs  -  Identify cognitive and physical deficits and behaviors that affect risk of falls    -  [place] fall precautions as indicated by assessment   - Educate patient/family on patient safety including physical limitations  - Instruct patient to call for assistance with activity based on assessment  - Modify environment to reduce risk of injury  - Consider OT/PT consult to assist with strengthening/mobility  Outcome: Progressing  Goal: Maintain or return to baseline ADL function  Description  INTERVENTIONS:  -  Assess patient's ability to carry out ADLs; assess patient's baseline for ADL function and identify physical deficits which impact ability to perform ADLs (bathing, care of mouth/teeth, toileting, grooming, dressing, etc )  - Assess/evaluate cause of self-care deficits   - Assess range of motion  - Assess patient's mobility; develop plan if impaired  - Assess patient's need for assistive devices and provide as appropriate  - Encourage maximum independence but intervene and supervise when necessary  - Involve family in performance of ADLs  - Assess for home care needs following discharge   - Consider OT consult to assist with ADL evaluation and planning for discharge  - Provide patient education as appropriate  Outcome: Progressing  Goal: Maintain or return mobility status to optimal level  Description  INTERVENTIONS:  - Assess patient's baseline mobility status (ambulation, transfers, stairs, etc )    - Identify cognitive and physical deficits and behaviors that affect mobility  - Identify mobility aids required to assist with transfers and/or ambulation (gait belt, sit-to-stand, lift, walker, cane, etc )  - [place] fall precautions as indicated by assessment  - Record patient progress and toleration of activity level on Mobility SBAR; progress patient to next Phase/Stage  - Instruct patient to call for assistance with activity based on assessment  - Consider rehabilitation consult to assist with strengthening/weightbearing, etc   Outcome: Progressing     Problem: DISCHARGE PLANNING  Goal: Discharge to home or other facility with appropriate resources  Description  INTERVENTIONS:  - Identify barriers to discharge w/patient and caregiver  - Arrange for needed discharge resources and transportation as appropriate  - Identify discharge learning needs (meds, wound care, etc )  - Arrange for interpretive services to assist at discharge as needed  - Refer to Case Management Department for coordinating discharge planning if the patient needs post-hospital services based on physician/advanced practitioner order or complex needs related to functional status, cognitive ability, or social support system  Outcome: Progressing     Problem: Knowledge Deficit  Goal: Patient/family/caregiver demonstrates understanding of disease process, treatment plan, medications, and discharge instructions  Description  Complete learning assessment and assess knowledge base  Interventions:  - Provide teaching at level of understanding  - Provide teaching via preferred learning methods  Outcome: Progressing     Problem: Potential for Falls  Goal: Patient will remain free of falls  Description  INTERVENTIONS:  - Assess patient frequently for physical needs  -  Identify cognitive and physical deficits and behaviors that affect risk of falls    -  [place] fall precautions as indicated by assessment   - Educate patient/family on patient safety including physical limitations  - Instruct patient to call for assistance with activity based on assessment  - Modify environment to reduce risk of injury  - Consider OT/PT consult to assist with strengthening/mobility  Outcome: Progressing

## 2020-07-03 NOTE — ASSESSMENT & PLAN NOTE
· Observe off antibiotics since this is due to BCG instillation itself and not due to acute infection  · Supportive care

## 2020-07-03 NOTE — ASSESSMENT & PLAN NOTE
· CT was abnormal suggesting diskitis  · Follow-up MRI confirmed diskitis and beginning osteomyelitis of L1-L2  · Postop day 1 Status post image guided aspiration by IR today  · Add p r n   Oxycodone for Pain control  · Serial exam  · Follow-up results of aspiration  · Off antibiotics

## 2020-07-03 NOTE — PROGRESS NOTES
Progress Note - Infectious Disease   Jesus Venegas 66 y o  male MRN: [id number]  Unit/Bed#: E5 -01 Encounter: [id number]    Impression/Plan:  1  Abnormal lumbar spine CT   Patient's CT of the lumbar spine showed endplate irregularity from L1-L2  Unclear if this was same endplate edema of Q1-T0 JSSGL his laminectomy that was seen on MRI in 1/2020   Patient completed a repeat MRI last night  Results showed evidence of progression and possible diskitis/osteomyelitis  There is a small fluid collection over the anterior L1-L2 region   Patient was seen in IR yesterday for culture, biopsy, fungal culture, and AFB, results are pending  No pathology was sent, I will reach out to the micro lab to see if they have enough sample available for pathology to be added on  Patient is afebrile without leukocytosis  He is neurologically intact  Recommend holding additional antibiotics at this time while we await additional data  -monitor patient off additional antibiotics   -monitor CBC and BMP  -follow up IR aspiration/biopsy for culture, fungal, biopsy, and AFB  -monitor back pain     2  Dysuria   Patient reports his symptoms occurred after receiving his BCG injection last week  Annette Orellana describes this is a recurring problem with each BCG/cystoscopy procedure that he goes through   BCG injections are known to cause bladder pain and spasms   He has had recurring urine cultures which are negative but has repeatedly received antibiotic treatment despite these findings   I recommend the patient have conversations with his urology team at [facility] regarding his recurring symptoms and alternative ways for management as repeated antibiotic treatment puts patient at risk for resistant organisms and renal toxicities   Patient is afebrile without leukocytosis  New urine and blood cultures were negative   No indication for ongoing antibiotic treatment at this time     -monitor patient off additional antibiotics  -monitor CBC and BMP  -follow-up blood cultures  -monitor  symptoms  -monitor urine output  -ongoing follow-up as an outpatient with Rosemariedaron Willy     3  CKD stage 4  Upon review of patient's available past medical records it appears his baseline creatinine is generally 2 2-2 5   Patient's creatinine was 2 62 upon admission  He is stable with creatinine of 2 33 today   -monitor BMP  -avoid nephrotoxins     4  Bladder cancer  [facility] is currently in care at [address] initially diagnosed in 1994 to 16  He underwent several TURBT and a few rounds of BCG  He followed up with annual endoscopic surveillance which had been negative until new findings were noted in July 2016  He then underwent additioanl TURBT and has had ongoing BCG+INF treatments since   -continue outpatient follow-up with [facility] after discharge     5  Diarrhea  Patient with multiple watery stools yesterday  C-diff PCR was sent and was negative  Consider the diarrhea may be side effect of recent broad spectrum antibiotics  Fortunately his diarrhea is improving  He has no other GI complaints at this time and is tolerating oral intake without difficulty   -serial abdominal exams  -monitor GI symptoms  -monitor stool output    We will continue to follow along  The patient will be formally reassessed by the infectious disease team on Monday, 7/6/2020  Please call sooner with questions or concerns  Above plan was discussed in detail with patient at the bedside  Above plan was discussed in detail with [facility] attending, Dr Mari Caballero  Antibiotics:  No current antibiotic use    Subjective:  Patient reports he is feeling well today  He is agreeable to staying inpatient until culture results are received on his lumbar specimen  He continues to have intermittent pain in his lower back  Reports less weakness in his legs and improved tolerance with ambulating and standing still to perform ADLs    He denies fevers, chills, sweats, shakes; no nausea, vomiting, abdominal pain, or dysuria; he reports his diarrhea is much better as far as amount, appearance, and frequency; no cough, shortness of breath, or chest pain  No new symptoms  Objective:  Vitals:  Temp:  [97 1 °F (36 2 °C)-97 7 °F (36 5 °C)] 97 1 °F (36 2 °C)  HR:  [60-82] 60  Resp:  [18] 18  BP: (115-174)/(68-92) 136/83  SpO2:  [97 %-100 %] 99 %  Temp (24hrs), Av 5 °F (36 4 °C), Min:97 1 °F (36 2 °C), Max:97 7 °F (36 5 °C)  Current: Temperature: (!) 97 1 °F (36 2 °C)    Physical Exam:   General Appearance:  Alert, interactive, nontoxic, no acute distress  Patient appeared comfortable supine in bed  Throat: Oropharynx moist without lesions  Lungs:   Clear to auscultation bilaterally; no wheezes, rhonchi or rales; respirations unlabored; patient is on room air   Heart:  RRR; no murmur, rub or gallop   Abdomen:   Soft, non-tender, non-distended, positive bowel sounds  Extremities: No clubbing or cyanosis, no edema   Skin: No new rashes, lesions, or draining wounds noted on exposed skin  Labs, Imaging, & Other studies:   All pertinent labs and imaging studies were personally reviewed  Results from last 7 days   Lab Units 20  0434 20  0507 20  0533   WBC Thousand/uL 7 78 8 59 10 19*   HEMOGLOBIN g/dL 10 4* 11 1* 12 5   PLATELETS Thousands/uL 173 191 180     Results from last 7 days   Lab Units 20  0434  20  0533 20  1504   POTASSIUM mmol/L 4 3   < > 4 5 4 7   CHLORIDE mmol/L 105   < > 105 104   CO2 mmol/L 21   < > 19* 24   BUN mg/dL 44*   < > 41* 42*   CREATININE mg/dL 2 33*   < > 2 56* 2 62*   EGFR ml/min/1 73sq m 30   < > 27 26   CALCIUM mg/dL 8 6   < > 9 0 8 6   AST U/L 38  --  61* 50*   ALT U/L 67  --  100* 97*   ALK PHOS U/L 84  --  102 100    < > = values in this interval not displayed  Results from last 7 days   Lab Units 20  1004 20  1805 20  1917 20  1900 20  1507   BLOOD CULTURE   --   --  No Growth at 72 hrs  No Growth at 72 hrs   --    GRAM STAIN RESULT  Rare Polys  No bacteria seen  --   --   --   --    URINE CULTURE   --   --   --   --  No Growth <1000 cfu/mL   C DIFF TOXIN B   --  Negative  --   --   --          Results from last 7 days   Lab Units 07/02/20  0507   CRP mg/L 9 9*

## 2020-07-04 PROBLEM — T36.95XA ANTIBIOTIC-ASSOCIATED DIARRHEA: Status: RESOLVED | Noted: 2020-07-01 | Resolved: 2020-07-04

## 2020-07-04 PROBLEM — K52.1 ANTIBIOTIC-ASSOCIATED DIARRHEA: Status: RESOLVED | Noted: 2020-07-01 | Resolved: 2020-07-04

## 2020-07-04 LAB
GLUCOSE SERPL-MCNC: 145 MG/DL (ref 65–140)
GLUCOSE SERPL-MCNC: 216 MG/DL (ref 65–140)
GLUCOSE SERPL-MCNC: 84 MG/DL (ref 65–140)
GLUCOSE SERPL-MCNC: 97 MG/DL (ref 65–140)

## 2020-07-04 PROCEDURE — 82948 REAGENT STRIP/BLOOD GLUCOSE: CPT

## 2020-07-04 PROCEDURE — 99232 SBSQ HOSP IP/OBS MODERATE 35: CPT | Performed by: INTERNAL MEDICINE

## 2020-07-04 RX ADMIN — DOCUSATE SODIUM 100 MG: 100 CAPSULE, LIQUID FILLED ORAL at 08:58

## 2020-07-04 RX ADMIN — ISOSORBIDE MONONITRATE 90 MG: 60 TABLET, EXTENDED RELEASE ORAL at 08:56

## 2020-07-04 RX ADMIN — INSULIN GLARGINE 30 UNITS: 100 INJECTION, SOLUTION SUBCUTANEOUS at 21:29

## 2020-07-04 RX ADMIN — INSULIN LISPRO 10 UNITS: 100 INJECTION, SOLUTION INTRAVENOUS; SUBCUTANEOUS at 07:29

## 2020-07-04 RX ADMIN — OXYCODONE HYDROCHLORIDE 5 MG: 5 TABLET ORAL at 09:18

## 2020-07-04 RX ADMIN — METOPROLOL SUCCINATE 100 MG: 50 TABLET, EXTENDED RELEASE ORAL at 08:58

## 2020-07-04 RX ADMIN — BIMATOPROST 1 DROP: 0.1 SOLUTION/ DROPS OPHTHALMIC at 21:30

## 2020-07-04 RX ADMIN — PANTOPRAZOLE SODIUM 40 MG: 40 TABLET, DELAYED RELEASE ORAL at 05:27

## 2020-07-04 RX ADMIN — HEPARIN SODIUM 5000 UNITS: 5000 INJECTION INTRAVENOUS; SUBCUTANEOUS at 13:49

## 2020-07-04 RX ADMIN — SERTRALINE HYDROCHLORIDE 50 MG: 50 TABLET ORAL at 08:58

## 2020-07-04 RX ADMIN — ACETAMINOPHEN 650 MG: 325 TABLET ORAL at 07:26

## 2020-07-04 RX ADMIN — ASPIRIN 81 MG 81 MG: 81 TABLET ORAL at 08:57

## 2020-07-04 RX ADMIN — HEPARIN SODIUM 5000 UNITS: 5000 INJECTION INTRAVENOUS; SUBCUTANEOUS at 21:30

## 2020-07-04 RX ADMIN — GABAPENTIN 600 MG: 300 CAPSULE ORAL at 08:58

## 2020-07-04 RX ADMIN — INSULIN LISPRO 2 UNITS: 100 INJECTION, SOLUTION INTRAVENOUS; SUBCUTANEOUS at 21:30

## 2020-07-04 RX ADMIN — INSULIN LISPRO 10 UNITS: 100 INJECTION, SOLUTION INTRAVENOUS; SUBCUTANEOUS at 16:55

## 2020-07-04 RX ADMIN — HEPARIN SODIUM 5000 UNITS: 5000 INJECTION INTRAVENOUS; SUBCUTANEOUS at 05:27

## 2020-07-04 NOTE — ASSESSMENT & PLAN NOTE
Ongoing treatments through [address] [facility] had a BCG treatment last Tuesday 6/23/19

## 2020-07-04 NOTE — PLAN OF CARE
Problem: PAIN - ADULT  Goal: Verbalizes/displays adequate comfort level or baseline comfort level  Description  Interventions:  - Encourage patient to monitor pain and request assistance  - Assess pain using appropriate pain scale  - Administer analgesics based on type and severity of pain and evaluate response  - Implement non-pharmacological measures as appropriate and evaluate response  - Consider cultural and social influences on pain and pain management  - Notify physician/advanced practitioner if interventions unsuccessful or patient reports new pain  Outcome: Progressing     Problem: INFECTION - ADULT  Goal: Absence or prevention of progression during hospitalization  Description  INTERVENTIONS:  - Assess and monitor for signs and symptoms of infection  - Monitor lab/diagnostic results  - Monitor all insertion sites, i e  indwelling lines, tubes, and drains  - Monitor endotracheal if appropriate and nasal secretions for changes in amount and color  - [place] appropriate cooling/warming therapies per order  - Administer medications as ordered  - Instruct and encourage patient and family to use good hand hygiene technique  - Identify and instruct in appropriate isolation precautions for identified infection/condition  Outcome: Progressing  Goal: Absence of fever/infection during neutropenic period  Description  INTERVENTIONS:  - Monitor WBC    Outcome: Progressing     Problem: SAFETY ADULT  Goal: Patient will remain free of falls  Description  INTERVENTIONS:  - Assess patient frequently for physical needs  -  Identify cognitive and physical deficits and behaviors that affect risk of falls    -  [place] fall precautions as indicated by assessment   - Educate patient/family on patient safety including physical limitations  - Instruct patient to call for assistance with activity based on assessment  - Modify environment to reduce risk of injury  - Consider OT/PT consult to assist with strengthening/mobility  Outcome: Progressing  Goal: Maintain or return to baseline ADL function  Description  INTERVENTIONS:  -  Assess patient's ability to carry out ADLs; assess patient's baseline for ADL function and identify physical deficits which impact ability to perform ADLs (bathing, care of mouth/teeth, toileting, grooming, dressing, etc )  - Assess/evaluate cause of self-care deficits   - Assess range of motion  - Assess patient's mobility; develop plan if impaired  - Assess patient's need for assistive devices and provide as appropriate  - Encourage maximum independence but intervene and supervise when necessary  - Involve family in performance of ADLs  - Assess for home care needs following discharge   - Consider OT consult to assist with ADL evaluation and planning for discharge  - Provide patient education as appropriate  Outcome: Progressing  Goal: Maintain or return mobility status to optimal level  Description  INTERVENTIONS:  - Assess patient's baseline mobility status (ambulation, transfers, stairs, etc )    - Identify cognitive and physical deficits and behaviors that affect mobility  - Identify mobility aids required to assist with transfers and/or ambulation (gait belt, sit-to-stand, lift, walker, cane, etc )  - [place] fall precautions as indicated by assessment  - Record patient progress and toleration of activity level on Mobility SBAR; progress patient to next Phase/Stage  - Instruct patient to call for assistance with activity based on assessment  - Consider rehabilitation consult to assist with strengthening/weightbearing, etc   Outcome: Progressing     Problem: DISCHARGE PLANNING  Goal: Discharge to home or other facility with appropriate resources  Description  INTERVENTIONS:  - Identify barriers to discharge w/patient and caregiver  - Arrange for needed discharge resources and transportation as appropriate  - Identify discharge learning needs (meds, wound care, etc )  - Arrange for interpretive services to assist at discharge as needed  - Refer to Case Management Department for coordinating discharge planning if the patient needs post-hospital services based on physician/advanced practitioner order or complex needs related to functional status, cognitive ability, or social support system  Outcome: Progressing     Problem: Knowledge Deficit  Goal: Patient/family/caregiver demonstrates understanding of disease process, treatment plan, medications, and discharge instructions  Description  Complete learning assessment and assess knowledge base  Interventions:  - Provide teaching at level of understanding  - Provide teaching via preferred learning methods  Outcome: Progressing     Problem: Potential for Falls  Goal: Patient will remain free of falls  Description  INTERVENTIONS:  - Assess patient frequently for physical needs  -  Identify cognitive and physical deficits and behaviors that affect risk of falls    -  [place] fall precautions as indicated by assessment   - Educate patient/family on patient safety including physical limitations  - Instruct patient to call for assistance with activity based on assessment  - Modify environment to reduce risk of injury  - Consider OT/PT consult to assist with strengthening/mobility  Outcome: Progressing

## 2020-07-04 NOTE — ASSESSMENT & PLAN NOTE
· CT was abnormal suggesting diskitis  · Follow-up MRI confirmed diskitis and beginning osteomyelitis of L1-L2  · Postop day 2 Status post image guided aspiration by IR  · Continuep r n  Oxycodone for Pain control  · Serial exam  · Off antibiotics  · Cultures negative  · Spoke with Micro and added cytology  However the remaining sample they have is small  Could not add pathology study for the fluid remaining

## 2020-07-05 LAB
BACTERIA BLD CULT: NORMAL
BACTERIA BLD CULT: NORMAL
GLUCOSE SERPL-MCNC: 128 MG/DL (ref 65–140)
GLUCOSE SERPL-MCNC: 167 MG/DL (ref 65–140)
GLUCOSE SERPL-MCNC: 203 MG/DL (ref 65–140)
GLUCOSE SERPL-MCNC: 80 MG/DL (ref 65–140)

## 2020-07-05 PROCEDURE — 97116 GAIT TRAINING THERAPY: CPT

## 2020-07-05 PROCEDURE — 99232 SBSQ HOSP IP/OBS MODERATE 35: CPT | Performed by: INTERNAL MEDICINE

## 2020-07-05 PROCEDURE — 97110 THERAPEUTIC EXERCISES: CPT

## 2020-07-05 PROCEDURE — 82948 REAGENT STRIP/BLOOD GLUCOSE: CPT

## 2020-07-05 RX ADMIN — ASPIRIN 81 MG 81 MG: 81 TABLET ORAL at 08:51

## 2020-07-05 RX ADMIN — INSULIN GLARGINE 30 UNITS: 100 INJECTION, SOLUTION SUBCUTANEOUS at 21:55

## 2020-07-05 RX ADMIN — HEPARIN SODIUM 5000 UNITS: 5000 INJECTION INTRAVENOUS; SUBCUTANEOUS at 15:09

## 2020-07-05 RX ADMIN — DOXYLAMINE SUCCINATE 25 MG: 25 TABLET ORAL at 21:55

## 2020-07-05 RX ADMIN — PANTOPRAZOLE SODIUM 40 MG: 40 TABLET, DELAYED RELEASE ORAL at 05:45

## 2020-07-05 RX ADMIN — SERTRALINE HYDROCHLORIDE 50 MG: 50 TABLET ORAL at 08:51

## 2020-07-05 RX ADMIN — ISOSORBIDE MONONITRATE 90 MG: 60 TABLET, EXTENDED RELEASE ORAL at 08:51

## 2020-07-05 RX ADMIN — METOPROLOL SUCCINATE 100 MG: 50 TABLET, EXTENDED RELEASE ORAL at 08:51

## 2020-07-05 RX ADMIN — BIMATOPROST 1 DROP: 0.1 SOLUTION/ DROPS OPHTHALMIC at 21:55

## 2020-07-05 RX ADMIN — INSULIN LISPRO 10 UNITS: 100 INJECTION, SOLUTION INTRAVENOUS; SUBCUTANEOUS at 16:55

## 2020-07-05 RX ADMIN — GABAPENTIN 600 MG: 300 CAPSULE ORAL at 08:51

## 2020-07-05 RX ADMIN — HEPARIN SODIUM 5000 UNITS: 5000 INJECTION INTRAVENOUS; SUBCUTANEOUS at 21:54

## 2020-07-05 RX ADMIN — INSULIN LISPRO 1 UNITS: 100 INJECTION, SOLUTION INTRAVENOUS; SUBCUTANEOUS at 16:55

## 2020-07-05 RX ADMIN — INSULIN LISPRO 2 UNITS: 100 INJECTION, SOLUTION INTRAVENOUS; SUBCUTANEOUS at 21:54

## 2020-07-05 RX ADMIN — HEPARIN SODIUM 5000 UNITS: 5000 INJECTION INTRAVENOUS; SUBCUTANEOUS at 05:45

## 2020-07-05 NOTE — PLAN OF CARE
Problem: PAIN - ADULT  Goal: Verbalizes/displays adequate comfort level or baseline comfort level  Description  Interventions:  - Encourage patient to monitor pain and request assistance  - Assess pain using appropriate pain scale  - Administer analgesics based on type and severity of pain and evaluate response  - Implement non-pharmacological measures as appropriate and evaluate response  - Consider cultural and social influences on pain and pain management  - Notify physician/advanced practitioner if interventions unsuccessful or patient reports new pain  Outcome: Progressing     Problem: INFECTION - ADULT  Goal: Absence or prevention of progression during hospitalization  Description  INTERVENTIONS:  - Assess and monitor for signs and symptoms of infection  - Monitor lab/diagnostic results  - Monitor all insertion sites, i e  indwelling lines, tubes, and drains  - Monitor endotracheal if appropriate and nasal secretions for changes in amount and color  - [place] appropriate cooling/warming therapies per order  - Administer medications as ordered  - Instruct and encourage patient and family to use good hand hygiene technique  - Identify and instruct in appropriate isolation precautions for identified infection/condition  Outcome: Progressing  Goal: Absence of fever/infection during neutropenic period  Description  INTERVENTIONS:  - Monitor WBC    Outcome: Progressing     Problem: SAFETY ADULT  Goal: Patient will remain free of falls  Description  INTERVENTIONS:  - Assess patient frequently for physical needs  -  Identify cognitive and physical deficits and behaviors that affect risk of falls    -  [place] fall precautions as indicated by assessment   - Educate patient/family on patient safety including physical limitations  - Instruct patient to call for assistance with activity based on assessment  - Modify environment to reduce risk of injury  - Consider OT/PT consult to assist with strengthening/mobility  Outcome: Progressing  Goal: Maintain or return to baseline ADL function  Description  INTERVENTIONS:  -  Assess patient's ability to carry out ADLs; assess patient's baseline for ADL function and identify physical deficits which impact ability to perform ADLs (bathing, care of mouth/teeth, toileting, grooming, dressing, etc )  - Assess/evaluate cause of self-care deficits   - Assess range of motion  - Assess patient's mobility; develop plan if impaired  - Assess patient's need for assistive devices and provide as appropriate  - Encourage maximum independence but intervene and supervise when necessary  - Involve family in performance of ADLs  - Assess for home care needs following discharge   - Consider OT consult to assist with ADL evaluation and planning for discharge  - Provide patient education as appropriate  Outcome: Progressing  Goal: Maintain or return mobility status to optimal level  Description  INTERVENTIONS:  - Assess patient's baseline mobility status (ambulation, transfers, stairs, etc )    - Identify cognitive and physical deficits and behaviors that affect mobility  - Identify mobility aids required to assist with transfers and/or ambulation (gait belt, sit-to-stand, lift, walker, cane, etc )  - [place] fall precautions as indicated by assessment  - Record patient progress and toleration of activity level on Mobility SBAR; progress patient to next Phase/Stage  - Instruct patient to call for assistance with activity based on assessment  - Consider rehabilitation consult to assist with strengthening/weightbearing, etc   Outcome: Progressing     Problem: DISCHARGE PLANNING  Goal: Discharge to home or other facility with appropriate resources  Description  INTERVENTIONS:  - Identify barriers to discharge w/patient and caregiver  - Arrange for needed discharge resources and transportation as appropriate  - Identify discharge learning needs (meds, wound care, etc )  - Arrange for interpretive services to assist at discharge as needed  - Refer to Case Management Department for coordinating discharge planning if the patient needs post-hospital services based on physician/advanced practitioner order or complex needs related to functional status, cognitive ability, or social support system  Outcome: Progressing     Problem: Knowledge Deficit  Goal: Patient/family/caregiver demonstrates understanding of disease process, treatment plan, medications, and discharge instructions  Description  Complete learning assessment and assess knowledge base  Interventions:  - Provide teaching at level of understanding  - Provide teaching via preferred learning methods  Outcome: Progressing     Problem: Potential for Falls  Goal: Patient will remain free of falls  Description  INTERVENTIONS:  - Assess patient frequently for physical needs  -  Identify cognitive and physical deficits and behaviors that affect risk of falls    -  [place] fall precautions as indicated by assessment   - Educate patient/family on patient safety including physical limitations  - Instruct patient to call for assistance with activity based on assessment  - Modify environment to reduce risk of injury  - Consider OT/PT consult to assist with strengthening/mobility  Outcome: Progressing

## 2020-07-05 NOTE — PROGRESS NOTES
Progress Note - [place] 1945, 66 y o  male MRN: [id number]    Unit/Bed#: E5 -01 Encounter: [id number]    Primary Care Provider: Adam Patton MD   Date and time admitted to hospital: 6/29/2020  1:59 PM        * Discitis of lumbar region  Assessment & Plan  · CT on admission was abnormal suggesting diskitis  · Follow-up MRI confirmed diskitis and beginning osteomyelitis of L1-L2  · Postop day 3 Status post image guided aspiration by IR  · Continue p r n  Oxycodone for Pain control  · Currently observing Off antibiotics  · Follow-up cultures and cytology  · Differentials:  Diskitis due to bacterial infection versus BCG versus malignancy  · Repeat formal ID evaluation on 07/06/2020 tiny to determine plan moving forward    Cystitis due to intravesical BCG administration  Assessment & Plan  · Observe off antibiotics since this is due to BCG instillation itself and not due to acute infection  · Supportive care    Chronic kidney disease, stage 4 (severe) ([facility])  Assessment & Plan  Stable and at baseline  Monitor closely    Malignant tumor of urinary bladder [facility])  Assessment & Plan  Ongoing treatments through [address] [place] [address] had a BCG treatment last Tuesday 6/23/19  VTE Pharmacologic Prophylaxis:   Pharmacologic: Enoxaparin (Lovenox)  Mechanical VTE Prophylaxis in Place: No    Patient Centered Rounds: Discussed with his nurse    Education and Discussions with Family / Patient:  Spoke with wife and gave update    Time Spent for Care: 25 minutes  More than 50% of total time spent on counseling and coordination of care as described above  Current Length of Stay: 6 day(s)    Current Patient Status: Inpatient   Certification Statement: The patient will continue to require additional inpatient hospital stay due to Diskitis    Discharge Plan:   To be determined    Code Status: Level 1 - Full Code      Subjective:   Mild low back pain from the side of injection  Occasional dysuria  No bowel incontinence  No weakness or numbness of the extremity  No difficulty walking    Objective:     Vitals:   Temp (24hrs), Av 9 °F (36 6 °C), Min:96 9 °F (36 1 °C), Max:98 7 °F (37 1 °C)    Temp:  [96 9 °F (36 1 °C)-98 7 °F (37 1 °C)] 96 9 °F (36 1 °C)  HR:  [56-65] 56  Resp:  [18] 18  BP: (135-154)/(80-90) 135/80  SpO2:  [94 %-100 %] 100 %  Body mass index is 29 06 kg/m²  Input and Output Summary (last 24 hours):     No intake or output data in the 24 hours ending 20 1537    Physical Exam:     Physical Exam   Constitutional: He appears well-developed  No distress  HENT:   Head: Normocephalic and atraumatic  Eyes: No scleral icterus  Neck: No JVD present  Cardiovascular: Regular rhythm  Exam reveals no gallop and no friction rub  No murmur heard  Pulmonary/Chest: Effort normal  No stridor  No respiratory distress  He has no wheezes  Abdominal: Soft  Musculoskeletal:   Left lower lumbar tenderness to palpation at the site of he injection/aspiration   Neurological:   Motor strength preserved  Ambulatory without assistance   Skin: Skin is warm and dry  He is not diaphoretic  Psychiatric: He has a normal mood and affect  His behavior is normal    Vitals reviewed      Additional Data:     Labs:    Results from last 7 days   Lab Units 20  0434   WBC Thousand/uL 7 78   HEMOGLOBIN g/dL 10 4*   HEMATOCRIT % 31 1*   PLATELETS Thousands/uL 173   NEUTROS PCT % 61   LYMPHS PCT % 26   MONOS PCT % 9   EOS PCT % 2     Results from last 7 days   Lab Units 20  0434   SODIUM mmol/L 137   POTASSIUM mmol/L 4 3   CHLORIDE mmol/L 105   CO2 mmol/L 21   BUN mg/dL 44*   CREATININE mg/dL 2 33*   ANION GAP mmol/L 11   CALCIUM mg/dL 8 6   ALBUMIN g/dL 3 2*   TOTAL BILIRUBIN mg/dL 0 30   ALK PHOS U/L 84   ALT U/L 67   AST U/L 38   GLUCOSE RANDOM mg/dL 116         Results from last 7 days   Lab Units 20  1103 20  0727 20  2058 20  1631 20  1124 20  0725 20  9317 07/03/20  1552 07/03/20  1109 07/03/20  0731 07/02/20 2052 07/02/20  1758   POC GLUCOSE mg/dl 128 80 216* 145* 84 97 206* 65 116 102 91 270*                   * I Have Reviewed All Lab Data Listed Above  * Additional Pertinent Lab Tests Reviewed: All Labs Within Last 24 Hours Reviewed    Imaging:    Imaging Reports Reviewed Today Include:  No new imaging      Recent Cultures (last 7 days):     Results from last 7 days   Lab Units 07/02/20  1004 06/30/20  1805 06/29/20  1917 06/29/20  1900 06/29/20  1507   BLOOD CULTURE   --   --  No Growth After 5 Days  No Growth After 5 Days    --    GRAM STAIN RESULT  Rare Polys  No bacteria seen  --   --   --   --    URINE CULTURE   --   --   --   --  No Growth <1000 cfu/mL   BODY FLUID CULTURE, STERILE  No growth  --   --   --   --    C DIFF TOXIN B   --  Negative  --   --   --        Last 24 Hours Medication List:     Current Facility-Administered Medications:  acetaminophen 650 mg Oral Q6H PRN ARPITA Reynoso-MANUELITO   ALPRAZolam 0 25 mg Oral BID PRN Dolph Aschoff, PA-C   aspirin 81 mg Oral Daily ARPITA Reynoso-MANUELITO   bimatoprost 1 drop Both Eyes HS ARPITA Reynoso-MANUELITO   docusate sodium 100 mg Oral Daily Fabienne Armstrong MD   doxylamine 25 mg Oral HS PRN Dolph Aschoff, PA-C   gabapentin 600 mg Oral Daily Lynda Ang, PA-C   heparin (porcine) 5,000 Units Subcutaneous Q8H [address] ARPITA Reynoso-C   insulin glargine 30 Units Subcutaneous HS Lynda Ang PA-C   insulin lispro 1-6 Units Subcutaneous TID AC ARPITA Reynoso-C   insulin lispro 1-6 Units Subcutaneous HS ARPITA Reynoso-C   insulin lispro 10 Units Subcutaneous TID With Meals Dolph Aschoff, PA-MANUELITO   isosorbide mononitrate 90 mg Oral Daily Lynda Ang PA-C   metoprolol succinate 100 mg Oral Daily Lynda Ang PA-MANUELITO   ondansetron 4 mg Intravenous Q6H PRN Dolph AscARPITA garcia-MANUELITO   oxyCODONE 5 mg Oral Q6H PRN Fabienne Armstrong MD   pantoprazole 40 mg Oral Early Morning Lynda Ang PA-C   sertraline 50 mg Oral Daily Estevan Aguilera BO Ang        Today, Patient Was Seen By: Ana Rosa Green MD    ** Please Note: Dictation voice to text software may have been used in the creation of this document   **

## 2020-07-05 NOTE — PHYSICAL THERAPY NOTE
Physical Therapy Treatment Note       07/05/20 1203   Pain Assessment   Pain Assessment Tool 0-10   Pain Score 4   Pain Location/Orientation Location: Back   Hospital Pain Intervention(s) Repositioned; Ambulation/increased activity; Emotional support   Restrictions/Precautions   Other Precautions Fall Risk;Pain   General   Chart Reviewed Yes   Family/Caregiver Present No   Cognition   Overall Cognitive Status WFL   Arousal/Participation Alert; Responsive   Attention Within functional limits   Orientation Level Oriented X4   Memory Within functional limits   Following Commands Follows one step commands without difficulty   Subjective   Subjective " i'm not feeling well today  I'd rather not do anything today "     Bed Mobility   Supine to Sit 5  Supervision   Additional items Assist x 1   Transfers   Sit to Stand 5  Supervision   Additional items Assist x 1   Stand to Sit 5  Supervision   Additional items Assist x 1;Trapeze bar   Ambulation/Elevation   Gait pattern Forward Flexion; Wide ROSALIA; Excessively slow   Gait Assistance 5  Supervision   Additional items Assist x 1;Verbal cues   Assistive Device Straight cane   Distance 160' x1 with 2 standing rest breaks   Stair Management Assistance Not tested   Balance   Static Sitting Good  (with forwrad flexed posture due to back pain  )   Static Standing Fair   Dynamic Standing Fair -   Ambulatory Fair   Endurance Deficit   Endurance Deficit Yes   Endurance Deficit Description pain   Activity Tolerance   Activity Tolerance Patient limited by pain   Exercises   Hip Flexion Sitting;10 reps;AROM; Bilateral   Knee AROM Long Arc Quad Sitting;10 reps;AROM; Bilateral   Ankle Pumps Sitting;10 reps;AROM; Bilateral   Assessment   Prognosis Fair   Problem List Decreased strength;Decreased endurance; Impaired balance;Decreased mobility; Impaired sensation;Pain   Assessment Pt  initially declining PT due to reports of not feeling well    With encouragement pt then agreeable to PT interventions  Pt performs bed mobility,transfers, and ambulation with supervision assist  Pt demonstrates gait deviations as noted in flow sheet with (+) lateral sway and displacement no gross lob noted  Pt   Progressed with ambulation distances, pt ambulated total distance of 160' with use of SPC with 2 standing rest breaks  Pt  Encouraged to wear sneakers however pt reports his sandals are much more comfortable and fit tight and will not slip off his feet  Pt performed seated b/l le arom exercises x 10 reps  Pt remained seated out of bed in chair at conclusion of PT session  Call bell in reach  Recommend d/c to home with family support and OPPT at d/c  Goals   Patient Goals for back pain to lessen  STG Expiration Date 07/10/20   PT Treatment Day 1   Plan   Treatment/Interventions Functional transfer training;LE strengthening/ROM; Therapeutic exercise; Endurance training;Patient/family training;Equipment eval/education; Bed mobility;Gait training   Progress Progressing toward goals   PT Frequency 2-3x/wk   Recommendation   PT Discharge Recommendation Return to previous environment with social support  (OPPT)   PT - OK to Discharge Yes        07/05/20 1203   Pain Assessment   Pain Assessment Tool 0-10   Pain Score 4   Pain Location/Orientation Location: Back   Hospital Pain Intervention(s) Repositioned; Ambulation/increased activity; Emotional support   Restrictions/Precautions   Other Precautions Fall Risk;Pain   General   Chart Reviewed Yes   Family/Caregiver Present No   Cognition   Overall Cognitive Status WFL   Arousal/Participation Alert; Responsive   Attention Within functional limits   Orientation Level Oriented X4   Memory Within functional limits   Following Commands Follows one step commands without difficulty   Subjective   Subjective " i'm not feeling well today   I'd rather not do anything today "     Bed Mobility   Supine to Sit 5  Supervision   Additional items Assist x 1   Transfers   Sit to Stand 5  Supervision   Additional items Assist x 1   Stand to Sit 5  Supervision   Additional items Assist x 1;Trapeze bar   Ambulation/Elevation   Gait pattern Forward Flexion; Wide ROSALIA; Excessively slow   Gait Assistance 5  Supervision   Additional items Assist x 1;Verbal cues   Assistive Device Straight cane   Distance 160' x1 with 2 standing rest breaks   Stair Management Assistance Not tested   Balance   Static Sitting Good  (with forwrad flexed posture due to back pain  )   Static Standing Fair   Dynamic Standing Fair -   Ambulatory Fair   Endurance Deficit   Endurance Deficit Yes   Endurance Deficit Description pain   Activity Tolerance   Activity Tolerance Patient limited by pain   Exercises   Hip Flexion Sitting;10 reps;AROM; Bilateral   Knee AROM Long Arc Quad Sitting;10 reps;AROM; Bilateral   Ankle Pumps Sitting;10 reps;AROM; Bilateral   Assessment   Prognosis Fair   Problem List Decreased strength;Decreased endurance; Impaired balance;Decreased mobility; Impaired sensation;Pain   Assessment Pt  initially declining PT due to reports of not feeling well  With encouragement pt then agreeable to PT interventions  Pt performs bed mobility,transfers, and ambulation with supervision assist  Pt demonstrates gait deviations as noted in flow sheet with (+) lateral sway and displacement no gross lob noted  Pt   Progressed with ambulation distances, pt ambulated total distance of 160' with use of SPC with 2 standing rest breaks  Pt  Encouraged to wear sneakers however pt reports his sandals are much more comfortable and fit tight and will not slip off his feet  Pt performed seated b/l le arom exercises x 10 reps  Pt remained seated out of bed in chair at conclusion of PT session  Call bell in reach  Recommend d/c to home with family support and OPPT at d/c  Goals   Patient Goals for back pain to lessen      STG Expiration Date 07/10/20   PT Treatment Day 1   Plan   Treatment/Interventions Functional transfer training;LE strengthening/ROM; Therapeutic exercise; Endurance training;Patient/family training;Equipment eval/education; Bed mobility;Gait training   Progress Progressing toward goals   PT Frequency 2-3x/wk   Recommendation   PT Discharge Recommendation Return to previous environment with social support  (OPPT)   PT - OK to Discharge Yes       Rhea Shira, PTA

## 2020-07-05 NOTE — ASSESSMENT & PLAN NOTE
· CT on admission was abnormal suggesting diskitis  · Follow-up MRI confirmed diskitis and beginning osteomyelitis of L1-L2  · Postop day 3 Status post image guided aspiration by IR  · Continue p r n   Oxycodone for Pain control  · Currently observing Off antibiotics  · Follow-up cultures and cytology  · Differentials:  Diskitis due to bacterial infection versus BCG versus malignancy  · Repeat formal ID evaluation on 07/06/2020 tiny to determine plan moving forward

## 2020-07-05 NOTE — ASSESSMENT & PLAN NOTE
Ongoing treatments through [facility] had a BCG treatment last Tuesday 6/23/19

## 2020-07-06 LAB
ANION GAP SERPL CALCULATED.3IONS-SCNC: 10 MMOL/L (ref 4–13)
BASOPHILS # BLD AUTO: 0.03 THOUSANDS/ΜL (ref 0–0.1)
BASOPHILS NFR BLD AUTO: 0 % (ref 0–1)
BUN SERPL-MCNC: 37 MG/DL (ref 5–25)
CALCIUM SERPL-MCNC: 8.5 MG/DL (ref 8.3–10.1)
CHLORIDE SERPL-SCNC: 106 MMOL/L (ref 100–108)
CO2 SERPL-SCNC: 23 MMOL/L (ref 21–32)
CREAT SERPL-MCNC: 2.3 MG/DL (ref 0.6–1.3)
EOSINOPHIL # BLD AUTO: 0.2 THOUSAND/ΜL (ref 0–0.61)
EOSINOPHIL NFR BLD AUTO: 3 % (ref 0–6)
ERYTHROCYTE [DISTWIDTH] IN BLOOD BY AUTOMATED COUNT: 13.2 % (ref 11.6–15.1)
GFR SERPL CREATININE-BSD FRML MDRD: 31 ML/MIN/1.73SQ M
GLUCOSE SERPL-MCNC: 109 MG/DL (ref 65–140)
GLUCOSE SERPL-MCNC: 118 MG/DL (ref 65–140)
GLUCOSE SERPL-MCNC: 203 MG/DL (ref 65–140)
GLUCOSE SERPL-MCNC: 209 MG/DL (ref 65–140)
HCT VFR BLD AUTO: 31.6 % (ref 36.5–49.3)
HGB BLD-MCNC: 10.8 G/DL (ref 12–17)
IMM GRANULOCYTES # BLD AUTO: 0.05 THOUSAND/UL (ref 0–0.2)
IMM GRANULOCYTES NFR BLD AUTO: 1 % (ref 0–2)
LYMPHOCYTES # BLD AUTO: 2.06 THOUSANDS/ΜL (ref 0.6–4.47)
LYMPHOCYTES NFR BLD AUTO: 30 % (ref 14–44)
MCH RBC QN AUTO: 32 PG (ref 26.8–34.3)
MCHC RBC AUTO-ENTMCNC: 34.2 G/DL (ref 31.4–37.4)
MCV RBC AUTO: 94 FL (ref 82–98)
MONOCYTES # BLD AUTO: 0.67 THOUSAND/ΜL (ref 0.17–1.22)
MONOCYTES NFR BLD AUTO: 10 % (ref 4–12)
NEUTROPHILS # BLD AUTO: 3.98 THOUSANDS/ΜL (ref 1.85–7.62)
NEUTS SEG NFR BLD AUTO: 56 % (ref 43–75)
NRBC BLD AUTO-RTO: 0 /100 WBCS
PLATELET # BLD AUTO: 178 THOUSANDS/UL (ref 149–390)
PMV BLD AUTO: 10.5 FL (ref 8.9–12.7)
POTASSIUM SERPL-SCNC: 4.1 MMOL/L (ref 3.5–5.3)
RBC # BLD AUTO: 3.37 MILLION/UL (ref 3.88–5.62)
SODIUM SERPL-SCNC: 139 MMOL/L (ref 136–145)
WBC # BLD AUTO: 6.99 THOUSAND/UL (ref 4.31–10.16)

## 2020-07-06 PROCEDURE — 80048 BASIC METABOLIC PNL TOTAL CA: CPT | Performed by: INTERNAL MEDICINE

## 2020-07-06 PROCEDURE — 99449 NTRPROF PH1/NTRNET/EHR 31/>: CPT | Performed by: PHYSICIAN ASSISTANT

## 2020-07-06 PROCEDURE — 99232 SBSQ HOSP IP/OBS MODERATE 35: CPT | Performed by: INTERNAL MEDICINE

## 2020-07-06 PROCEDURE — 85025 COMPLETE CBC W/AUTO DIFF WBC: CPT | Performed by: INTERNAL MEDICINE

## 2020-07-06 PROCEDURE — 82948 REAGENT STRIP/BLOOD GLUCOSE: CPT

## 2020-07-06 RX ADMIN — INSULIN GLARGINE 30 UNITS: 100 INJECTION, SOLUTION SUBCUTANEOUS at 22:20

## 2020-07-06 RX ADMIN — INSULIN LISPRO 10 UNITS: 100 INJECTION, SOLUTION INTRAVENOUS; SUBCUTANEOUS at 09:25

## 2020-07-06 RX ADMIN — METOPROLOL SUCCINATE 100 MG: 50 TABLET, EXTENDED RELEASE ORAL at 09:27

## 2020-07-06 RX ADMIN — SERTRALINE HYDROCHLORIDE 50 MG: 50 TABLET ORAL at 09:25

## 2020-07-06 RX ADMIN — BIMATOPROST 1 DROP: 0.1 SOLUTION/ DROPS OPHTHALMIC at 22:20

## 2020-07-06 RX ADMIN — DOCUSATE SODIUM 100 MG: 100 CAPSULE, LIQUID FILLED ORAL at 09:26

## 2020-07-06 RX ADMIN — PANTOPRAZOLE SODIUM 40 MG: 40 TABLET, DELAYED RELEASE ORAL at 05:44

## 2020-07-06 RX ADMIN — ASPIRIN 81 MG 81 MG: 81 TABLET ORAL at 09:26

## 2020-07-06 RX ADMIN — ISOSORBIDE MONONITRATE 90 MG: 60 TABLET, EXTENDED RELEASE ORAL at 09:26

## 2020-07-06 RX ADMIN — HEPARIN SODIUM 5000 UNITS: 5000 INJECTION INTRAVENOUS; SUBCUTANEOUS at 05:44

## 2020-07-06 RX ADMIN — INSULIN LISPRO 10 UNITS: 100 INJECTION, SOLUTION INTRAVENOUS; SUBCUTANEOUS at 12:46

## 2020-07-06 RX ADMIN — INSULIN LISPRO 2 UNITS: 100 INJECTION, SOLUTION INTRAVENOUS; SUBCUTANEOUS at 16:38

## 2020-07-06 RX ADMIN — DOXYLAMINE SUCCINATE 25 MG: 25 TABLET ORAL at 23:56

## 2020-07-06 RX ADMIN — INSULIN LISPRO 2 UNITS: 100 INJECTION, SOLUTION INTRAVENOUS; SUBCUTANEOUS at 09:24

## 2020-07-06 RX ADMIN — HEPARIN SODIUM 5000 UNITS: 5000 INJECTION INTRAVENOUS; SUBCUTANEOUS at 15:00

## 2020-07-06 RX ADMIN — INSULIN LISPRO 2 UNITS: 100 INJECTION, SOLUTION INTRAVENOUS; SUBCUTANEOUS at 22:20

## 2020-07-06 RX ADMIN — ACETAMINOPHEN 650 MG: 325 TABLET ORAL at 09:25

## 2020-07-06 RX ADMIN — HEPARIN SODIUM 5000 UNITS: 5000 INJECTION INTRAVENOUS; SUBCUTANEOUS at 22:20

## 2020-07-06 RX ADMIN — GABAPENTIN 600 MG: 300 CAPSULE ORAL at 09:26

## 2020-07-06 RX ADMIN — INSULIN LISPRO 10 UNITS: 100 INJECTION, SOLUTION INTRAVENOUS; SUBCUTANEOUS at 16:39

## 2020-07-06 NOTE — TELEMEDICINE
Detailed discussion was held with attending neurosurgeon Dr Sonny Mclean  In brief, patient is a 67 yo M with a long standing history of back pain with evidence of neurogenic claudication  Patient was seen beginning of 2019 and recommended to follow up outpatient for possible surgical intervention including decompression  Patient decline us scheduling an appointment and never returned for follow up  Patient appears to have had a 1 level L1-2 single level decompression at [facility] 6/10/2019  He was again admitted to [facility] on 6/29/2020 with urinary complaints primarily  Patient does have a history of malignant bladder cancer currently receiving chemotherapy with [facility] with BCG injections  CT scan lumbar spine 6/29/2020: revealed endplate irregularity G8-6 with vacuum disc phenomenon  Cannot rule out superimposed infection in setting of DJD  MRI lumbar spine without contrast 06/30/2020:  New changes at L1-2 level consistent with possible orally diskitis/osteomyelitis  Previous distance severe lumbar spondylosis and osteoarthritis  Stable relative rest of basis of L4 on collapsed L5 body resulting in significant foraminal and lateral recess stenosis  No evidence of abscess or collection at this time  ESR- 23 (7/2), CRP 9 9 (7/2), WBC 6 99 (7/6)    Based on discussion with attending neurosurgeon current recommendations are as follows:  1  Would recommend repeat MRI lumbar spine with contrast to assist with infectious differential   2  Case was discussed with attending neurosurgeon and Dr Suzi Hunter, would order Gallium WBC nuclear medicine test which can be completed at [facility]  3  Would complete imaging prior to attempting second IR biopsy allowing for 10-14 days off ABX in attempt to gather best yield possible  If imaging completed this week can consider repeat IR biopsy possible Friday  4  Continue to monitor patient off ABX at this juncture  4  No need for transfer to [facility] at this time   Neurosurgery will continue to follow as needing during this time  Please do not hesitate to call at this time  Given risk associated with open procedure including; anesthesia, MI, DVT, PE, surgical infection, bleeding risk on ASA  Presently we would not recommend an open biopsy and would continue to monitor patient off ABX with less invasive interventions first  Will be available as needed  Primary team and ID updated  Approximately 35 minutes spent directly communicating with patients care team, reviewing imaging, notes and necessary lab work

## 2020-07-06 NOTE — PROGRESS NOTES
[facility] Progress Note  Patient: Aryan Mcclelland 66 y o  male   MRN: [id number]  PCP: William Fermin MD  Unit/Bed#: E5 -01 Encounter: [id number]  Date Of Visit: 07/06/20      Assessment/plan  1  Low back pain with abnormal MRI of lumbar spine- appreciate ID recommendations  Pt has a history of low back pain that had worsened  He is s/p ct on admission that suggested discitis  Esr was 23 and crp 9 9  He had received ceftriaxone and than vanco with zosyn  Further antibiotics were held and he under went a biopsy by IR  Biopsy was negative for infection but it was incomplete  ID has requested review by neurosx to see if an open biopsy could be completed  Spoke with neurosurgery who recommended repeat IR aspiration as pts crp and esr were low and he has been off of antibiotics  Discussed with IR who will review and let me know if repeat aspiration or biopsy would be possible  Will make pt npo after midnight incase repeat aspiration  2  Dysuria- symptoms started after receiving bcg injections which is a recurring problem with each cystoscopy bcg procedure  Urine culture was negative  He will discuss with [facility] urology team in regards to these recurring symptoms to find alternative ways of treatment that does not require repeated antibiotics  3  ckd4- stable  4  Malignant tumor of urinary bladder- follow up with [facility] alvarez  Subjective:   Pt seen and examined  Pt reports that his back pain is minimal  He states he often has back pain on a daily basis  He denies any radiation of his pain  He denies any numbness or tingling in his leg with the pain  He was able to ambulate 160 feet x1 with physical therapy yesterday  He is still having urinary discomfort in which he has some pain with urination and frequency  No f/c no cp no sob no n/v/d no abd pain  Objective:     Vitals: Blood pressure 146/94, pulse 64, temperature (!) 97 2 °F (36 2 °C), temperature source Temporal, resp   rate 18, height 6' 4" (1 93 m), weight 108 kg (238 lb 12 1 oz), SpO2 99 %  ,Body mass index is 29 06 kg/m²  Lab, Imaging and other studies:  Results from last 7 days   Lab Units 07/06/20  0456   WBC Thousand/uL 6 99   HEMOGLOBIN g/dL 10 8*   HEMATOCRIT % 31 6*   PLATELETS Thousands/uL 178     Results from last 7 days   Lab Units 07/06/20  0456 07/03/20  0434   POTASSIUM mmol/L 4 1 4 3   CHLORIDE mmol/L 106 105   CO2 mmol/L 23 21   BUN mg/dL 37* 44*   CREATININE mg/dL 2 30* 2 33*   CALCIUM mg/dL 8 5 8 6   ALK PHOS U/L  --  84   ALT U/L  --  67   AST U/L  --  38         Lab Results   Component Value Date    BLOODCX No Growth After 5 Days  06/29/2020    BLOODCX No Growth After 5 Days  06/29/2020    BLOODCX No Growth After 5 Days  05/12/2017    URINECX No Growth <1000 cfu/mL 06/29/2020    URINECX No Growth <1000 cfu/mL 06/24/2020    URINECX No Growth <1000 cfu/mL 06/09/2020    WOUNDCULT 4+ Growth of Staphylococcus aureus 04/21/2017    WOUNDCULT 4+ Growth of Beta Hemolytic Streptococcus Group G 04/21/2017    WOUNDCULT 4+ Growth of Diphtheroids 04/21/2017         Ct Chest Abdomen Pelvis Wo Contrast    Result Date: 6/29/2020  Narrative: CT CHEST, ABDOMEN AND PELVIS WITHOUT IV CONTRAST INDICATION:   abnormal xray  COMPARISON:  9/3/2019 TECHNIQUE: CT examination of the chest, abdomen and pelvis was performed without intravenous contrast   Axial, sagittal, and coronal 2D reformatted images were created from the source data and submitted for interpretation  Radiation dose length product (DLP) for this visit:  846 mGy-cm   This examination, like all CT scans performed in the [facility], was performed utilizing techniques to minimize radiation dose exposure, including the use of iterative reconstruction and automated exposure control  Enteric contrast was administered  FINDINGS: CHEST LUNGS:  No suspicious pulmonary nodules are identified to correspond to the abnormality on prior radiograph    The central airways are patent  PLEURA:  Unremarkable  HEART/GREAT VESSELS:  There is coronary artery calcification  Heart is normal in size  There is no pericardial effusion  MEDIASTINUM AND DIMAS:  Unremarkable  CHEST WALL AND LOWER NECK:   Unremarkable  ABDOMEN LIVER/BILIARY TREE:  Unremarkable  GALLBLADDER:  Gallbladder is surgically absent  SPLEEN:  Unremarkable  PANCREAS:  Unremarkable  ADRENAL GLANDS:  Unremarkable  KIDNEYS/URETERS:  One or more simple renal cyst(s) is noted  Otherwise unremarkable kidneys  No hydronephrosis  STOMACH AND BOWEL:  Status post gastric bypass  There is no bowel obstruction  There is colonic diverticulosis without evidence for diverticulitis  APPENDIX:  No findings to suggest appendicitis  ABDOMINOPELVIC CAVITY:  No ascites  No pneumoperitoneum  No lymphadenopathy  VESSELS:  Atherosclerotic changes are present  No evidence of aneurysm  PELVIS REPRODUCTIVE ORGANS:  Prostate gland is enlarged measuring up to 6 7 cm  URINARY BLADDER:  There is urinary bladder wall thickening with surrounding inflammatory change  Findings likely represent a combination of infection superimposed on a background of the patient's known bladder neoplasm  ABDOMINAL WALL/INGUINAL REGIONS:  Probable injection sites are noted within the ventral abdominal wall as previously suggested  OSSEOUS STRUCTURES:  There are degenerative changes of the spine  Correlate with separate CT lumbar spine report for the remainder the findings  Impression: No focal airspace consolidation or suspicious nodule to correlate with findings on prior chest x-ray  Diffuse urinary bladder wall thickening with new surrounding inflammatory change  Findings are suspicious for infection superimposed on background of the patient's known bladder neoplasm  Enlarged prostate gland  Remainder of the findings, as described above  Workstation performed: [id number]     Xr Chest 2 Views    Result Date: 6/9/2020  Narrative: CHEST INDICATION:   chills  COMPARISON:  7/25/2015 and 2/22/2017 EXAM PERFORMED/VIEWS:  XR CHEST PA & LATERAL  The frontal view was performed utilizing dual energy radiographic technique  Images: 4 FINDINGS: Cardiomediastinal silhouette appears unremarkable  Linear/oblong triangular shaped density in the right lower lobe not seen on prior exams likely represents scarring or atelectasis however recommend elective chest CT for confirmation of benignity  The lungs are otherwise clear  No pneumothorax or pleural effusion  Osseous structures appear within normal limits for patient age  Impression: Linear/oblong triangular shaped density in the right lower lobe not seen on prior exams likely represents scarring or atelectasis however recommend elective chest CT for confirmation of benignity  No effusion, airspace disease or pneumothorax  The study was marked in [facility] for immediate notification  Workstation performed: [id number]     Mri Lumbar Spine Wo Contrast    Result Date: 7/1/2020  Narrative: MRI LUMBAR SPINE WITHOUT CONTRAST INDICATION: possible infected L spine  COMPARISON:  CT 6/29/2020, x-ray 12/26/2019, MR 1/31/2019 TECHNIQUE:  Sagittal T1, sagittal T2, sagittal inversion recovery, axial T1 and axial T2, coronal T2   IMAGE QUALITY:  Diagnostic FINDINGS: VERTEBRAL BODIES:  There are 5 nonrib bearing lumbar type vertebral bodies  Dextroscoliosis L2-3 apex, straightening of normal lumbar lordosis, relative retrolisthesis of L4 on L5 with pseudarthrosis between the posterior bodies stable  Leftward translation of L1, mild rightward translation of L3  New marrow edema is present at the opposing endplates at the O5-8 level  The findings are worrisome for early discitis osteomyelitis  Although there is no phlegmon and there is no epidural inflammation  Chronic reactive endplate changes to the left at the L2-L3 level  SACRUM:  Normal signal within the sacrum  No evidence of insufficiency or stress fracture   DISTAL CORD AND CONUS:  Normal size and signal within the distal cord and conus  Conus terminates at the T12 level  PARASPINAL SOFT TISSUES:  Paraspinal soft tissues are unremarkable  Multiple stable bilateral renal cysts  LOWER THORACIC DISC SPACES:  Normal disc height and signal   No disc herniation, canal stenosis or foraminal narrowing  Bulging disc T12-L1, right facet arthrosis T11-T12, no critical stenosis  LUMBAR DISC SPACES: L1-L2:  Progressive loss of disc height, new endplate irregularity, marrow edema within the opposing vertebral bodies most notable to the left  There is also fluid in the anterior aspect of the disc  Findings consistent with early discitis/osteomyelitis  No paraspinal phlegmon  Bilateral laminectomy  L2-L3:  Asymmetric loss of disc height to the left, marginal osteophytes, advanced bilateral facet arthrosis, circumferential bulge and mild narrowing of the lateral recesses  L3-L4:  Nitrogenous degeneration of the nucleus, marginal osteophytes, advanced facet arthrosis  Disc extends into the foramen bilaterally without root compression  Circumferential bulging the discs with stable central protrusion, producing mild canal stenosis  L4-L5:  L4 is retrolisthesis on to the collapsed the posterior-superior L5 vertebral body with alteration of normal horizontal plane of the disc  Advanced bilateral facet arthrosis with severe right greater than left foraminal stenosis  Correlate for L4 radiculitis  Disc bulges circumferentially with prolapse caudally, narrowing the lateral recesses  Correlate for L5 radiculitis  L5-S1:  Moderate bilateral facet arthrosis  Impression: New changes at the L1-L2 level consistent with early discitis osteomyelitis  No paraspinal or epidural phlegmon  Pre-existing severe lumbar spondylosis and osteoarthritis  Stable Relative retrolisthesis of L4 on the collapsed L5 vertebral body resulting in significant foraminal and lateral recess stenosis    Correlate for bilateral L4 and bilateral L5 radiculitis  The study was marked in [employer] for significant notification  Workstation performed: [id number]     Ir Image Guided Biopsy/aspiration    Result Date: 7/2/2020  Narrative: IR IMAGE GUIDED BIOPSY/ASPIRATION Indication: Abnormal  imaging suggestive of lumbar discitis osteomyelitis  Procedure and findings: The procedure was performed in the prone position  IV sedation was given   imaging of the lumbar spine was performed  Appropriate level was identified in 2 projections and confirmed with Dr Raegan Rodarte from the department of radiology  Patient has a transitional lumbosacral junction and therefore the involved level was described as L1-2  However, counting nonrib bearing vertebrae from the top down, the level could be described as L2-3  Endplate irregularity and widening is seen fluoroscopically  Left paralumbar approach was used to place an 18-gauge needle to the outer edge of the disc space posteriorly and coaxial aspiration was performed with a 22-gauge Chiba needle  Several passes were made yielding small fragments  of disc material   No fluid was obtained  This material was placed into sterile container with preservative-free saline  Finally, needle agitation was performed with an 18-gauge needle and small fragments of tissue and droplets of blood were obtained and also submitted for analysis  Routine culture and AFB was requested with Gram stain  The puncture site was cleansed and a dressing was applied  Fluoroscopy time (min) : 4 5 MIN Images: 6 Contrast (ml) : None  Sedation (min) : 25 minutes     Impression: Impression: Technically successful lumbar this aspiration using fluoroscopic guidance  Depending on nomenclature, involved level could be described as L1-2 or L2-3 due to transitional lumbosacral junction  Involved disc space is between the 2nd and 3rd non-rib bearing vertebrae   Workstation performed: [id number]     Ct Recon Only Lumbar Spine (no Charge)    Result Date: 6/29/2020  Narrative: CT LUMBAR SPINE INDICATION:   pain h/o cancer and back pain  COMPARISON:  CT from 9/3/2019 and 1/31/2019 TECHNIQUE: Axial CT examination of the lumbar spine was obtained utilizing reconstructed images from CT of the chest, abdomen and pelvis performed the same day  Images were reformatted in the sagittal and coronal planes  This examination, like all CT scans performed in the [facility], was performed utilizing techniques to minimize radiation dose exposure, including the use of iterative reconstruction and automated exposure control  FINDINGS: ALIGNMENT: There is no acute fracture  There is trace retrolisthesis of L5-S1  There is scoliosis  VERTEBRAL BODIES: There is mild chronic wedging of the L5 vertebral body, stable  DEGENERATIVE CHANGES: In the interval since the prior examination there has been interval development of endplate irregularity at L1-L2  This is superimposed on a background of previously seen vacuum phenomenon at this level  There is ventral and dorsal osteophytosis noted  There is a laminectomy defect at this level  At L2-L3 there is disc space narrowing with a bulging annulus and dorsal osteophytosis  There is marginal osteophytosis  There is facet arthrosis  There is mild to moderate bony canal stenosis and mild foraminal narrowing  At L3-L4 there is vacuum disc phenomenon  There is a bulging annulus  There is dorsal and ventral osteophyte ptosis  There is marginal osteophytosis  There is facet arthrosis  There is moderate canal stenosis  There is moderate foraminal narrowing  At L4-L5 there is a bulging annulus  There is dorsal osteophytosis  There is facet arthrosis  There is marginal osteophytosis  There is mild canal stenosis  There is severe bilateral foraminal narrowing  At L5-S1 there is facet arthrosis  There is no significant bony canal stenosis or foraminal narrowing   PREVERTEBRAL AND PARASPINAL SOFT TISSUES: There is no significant prevertebral soft tissue swelling  Impression: Interval development of endplate irregularity at L1-L2 with loss of vacuum disc phenomena at this level  Findings raise the possibility for infection superimposed on the background of previously seen degenerative disc disease  Correlation with MRI as well as ESR levels is recommended for further evaluation  Multilevel degenerative changes of the lumbar spine, as described above    I personally discussed this study with Dr Macrina Abraham on 6/29/2020 at 4:42 PM  Workstation performed: [id number]       Scheduled Meds:   Current Facility-Administered Medications:  acetaminophen 650 mg Oral Q6H PRN Rahel Lark, PA-C   ALPRAZolam 0 25 mg Oral BID PRN Rahel Lark, PA-C   aspirin 81 mg Oral Daily Lynda Piger, PA-C   bimatoprost 1 drop Both Eyes HS Lynda Piger, PA-C   docusate sodium 100 mg Oral Daily Dorothy Tripp MD   doxylamine 25 mg Oral HS PRN Rahel Lark, PA-C   gabapentin 600 mg Oral Daily Lynda Piger, PA-C   heparin (porcine) 5,000 Units Subcutaneous Q8H [facility] Lynda Piger, PA-C   insulin glargine 30 Units Subcutaneous HS Lynda Piger, PA-C   insulin lispro 1-6 Units Subcutaneous TID AC Lynda Piger, PA-C   insulin lispro 1-6 Units Subcutaneous HS Lynda Piger, PA-C   insulin lispro 10 Units Subcutaneous TID With Meals Rahel Lark, PA-C   isosorbide mononitrate 90 mg Oral Daily Lynda Piger, PA-C   metoprolol succinate 100 mg Oral Daily Lynda Piger, PA-C   ondansetron 4 mg Intravenous Q6H PRN Rahel Lark, PA-C   oxyCODONE 5 mg Oral Q6H PRN Dorothy Tripp MD   pantoprazole 40 mg Oral Early Morning Lynda Piger, PA-C   sertraline 50 mg Oral Daily Lynda Piger, PA-C     Continuous Infusions:    PRN Meds:   acetaminophen    ALPRAZolam    doxylamine    ondansetron    oxyCODONE      Physical exam:  Physical Exam  General appearance: alert and oriented, in no acute distress  Head: Normocephalic, without obvious abnormality, atraumatic  Eyes: conjunctivae/corneas clear  PERRL, EOM's intact  Fundi benign    Neck: no adenopathy, no carotid bruit, no JVD, supple, symmetrical, trachea midline and thyroid not enlarged, symmetric, no tenderness/mass/nodules  Lungs: clear to auscultation bilaterally  Heart: regular rate and rhythm, S1, S2 normal, no murmur, click, rub or gallop  Abdomen: soft, non-tender; bowel sounds normal; no masses,  no organomegaly  Extremities: extremities normal, warm and well-perfused; no cyanosis, clubbing, or edema  Pulses: 2+ and symmetric  Skin: bandage on back c/d/i  Neurologic: Mental status: Alert, oriented, thought content appropriate      VTE Pharmacologic Prophylaxis: Heparin  VTE Mechanical Prophylaxis: sequential compression device    Counseling / Coordination of Care  Total floor / unit time spent today 20 minutes     Current Length of Stay: 7 day(s)    Current Patient Status: Inpatient       Code Status: Level 1 - Full Code

## 2020-07-06 NOTE — PROGRESS NOTES
Progress Note - Infectious Disease   Alia Livers 66 y o  male MRN: [id number]  Unit/Bed#: E5 -01 Encounter: [id number]    Impression/Plan:  1  Abnormal lumbar spine CT   Patient's CT of the lumbar spine showed endplate irregularity from L1-L2  Unclear if this was same endplate edema of C3-H4 FGDVA his laminectomy that was seen on MRI in 1/2020   Patient completed a repeat MRI  Results showed evidence of progression and possible diskitis/osteomyelitis  There was a small fluid collection over the anterior L1-L2 region   Patient was seen in IR for culture, fungal culture, and AFB  No patholody was sent  Culture and AFB are preliminarily negative  Fungal culture is still pending  Patient is afebrile without leukocytosis  He is neurologically intact  Recommend holding additional antibiotics at this time while we await additional data  He will likely need PICC placement for long term antibiotic treatment  Recommend requesting open biopsy with neurosurgery to check pathology, culture, AFB and fungal culture to help guide antibiotic therapy  -monitor patient off additional antibiotics   -anticipate eventual PICC placement and treatment with long term antibiotics  -monitor CBC and BMP  -follow up final lumbar spine culture, fungal, biopsy, and AFB  -recommend open biopsy by neurosurgery to re-evaluate culture, AFB, and fungal culture in addition to collecting specimen for pathology  -monitor back pain     2  Dysuria   Patient reports his symptoms occurred after receiving BCG injection  He describes this is a recurring problem with each BCG/cystoscopy procedure that he goes through   BCG injections are known to cause bladder pain and spasms   He has had recurring urine cultures which are negative but has repeatedly received antibiotic treatment despite these findings   I recommend the patient have conversations with his urology team at [facility] regarding his recurring symptoms and alternative ways for management as repeated antibiotic treatment puts patient at risk for resistant organisms and renal toxicities   Patient is afebrile without leukocytosis  New urine and blood cultures were negative   No indication for ongoing antibiotic treatment at this time     -monitor patient off additional antibiotics  -monitor CBC and BMP  -monitor  symptoms  -monitor urine output  -ongoing follow-up as an outpatient with Jason Otto     3  CKD stage 4  Upon review of patient's available past medical records it appears his baseline creatinine is generally 2 2-2 5   Patient's creatinine was 2 62 upon admission  He is stable with creatinine of 2 3 today   -monitor BMP  -avoid nephrotoxins     4  Bladder cancer  Luna Palma is currently in care at [address] [place] [address] initially diagnosed in 1994 to [facility]  He underwent several TURBT and a few rounds of BCG  He followed up with annual endoscopic surveillance which had been negative until new findings were noted in July 2016  He then underwent additioanl TURBT and has had ongoing BCG+INF treatments since   -continue outpatient follow-up with [facility] after discharge     5  Diarrhea  Patient with multiple watery stools yesterday  C-diff PCR was sent and was negative  Consider the diarrhea may be side effect of recent broad spectrum antibiotics  Fortunately his diarrhea has improved  He has no other GI complaints at this time and is tolerating oral intake without difficulty   -serial abdominal exams  -monitor GI symptoms  -monitor stool output    Above plan was discussed in detail with patient at the bedside  Antibiotics:  No current antibiotic use  Subjective:  Patient reports he's doing fine today  He reports no acute changes over the weekend or this morning  He continues to have intermittent back pain but the pain does not radiate and he has less weakness with ambulation  He still has occasional bladder spasms and mild dysuria   He denies fevers, chills, sweats, shakes; no nausea, vomiting, abdominal pain; no ongoing diarrhea; no cough, shortness of breath, or chest pain  No new symptoms  Objective:  Vitals:  Temp:  [96 9 °F (36 1 °C)-97 5 °F (36 4 °C)] 97 2 °F (36 2 °C)  HR:  [54-64] 64  Resp:  [18] 18  BP: (135-148)/(80-94) 146/94  SpO2:  [99 %-100 %] 99 %  Temp (24hrs), Av 2 °F (36 2 °C), Min:96 9 °F (36 1 °C), Max:97 5 °F (36 4 °C)  Current: Temperature: (!) 97 2 °F (36 2 °C)    Physical Exam:   General Appearance:  Alert, interactive, nontoxic, no acute distress  Patient appears comfortable supine in bed  Throat: Oropharynx moist without lesions  Lungs:   Clear to auscultation bilaterally; no wheezes, rhonchi or rales; respirations unlabored; patient is on room air   Heart:  RRR; no murmur, rub or gallop   Abdomen:   Soft, non-tender, non-distended, positive bowel sounds  Extremities: No clubbing or cyanosis, no edema   Skin: No new rashes, lesions, or draining wounds noted on exposed skin  Labs, Imaging, & Other studies:   All pertinent labs and imaging studies were personally reviewed  Results from last 7 days   Lab Units 20  0456 20  0434 20  0507   WBC Thousand/uL 6 99 7 78 8 59   HEMOGLOBIN g/dL 10 8* 10 4* 11 1*   PLATELETS Thousands/uL 178 173 191     Results from last 7 days   Lab Units 20  0456 20  0434  20  0533 20  1504   POTASSIUM mmol/L 4 1 4 3   < > 4 5 4 7   CHLORIDE mmol/L 106 105   < > 105 104   CO2 mmol/L 23 21   < > 19* 24   BUN mg/dL 37* 44*   < > 41* 42*   CREATININE mg/dL 2 30* 2 33*   < > 2 56* 2 62*   EGFR ml/min/1 73sq m 31 30   < > 27 26   CALCIUM mg/dL 8 5 8 6   < > 9 0 8 6   AST U/L  --  38  --  61* 50*   ALT U/L  --  67  --  100* 97*   ALK PHOS U/L  --  84  --  102 100    < > = values in this interval not displayed  Results from last 7 days   Lab Units 20  1004 20  1805 20  1917 20  1900 20  1507   BLOOD CULTURE   --   --  No Growth After 5 Days   No Growth After 5 Days    --    GRAM STAIN RESULT  Rare Polys  No bacteria seen  --   --   --   --    URINE CULTURE   --   --   --   --  No Growth <1000 cfu/mL   BODY FLUID CULTURE, STERILE  No growth  --   --   --   --    C DIFF TOXIN B   --  Negative  --   --   --          Results from last 7 days   Lab Units 07/02/20  0507   CRP mg/L 9 9*

## 2020-07-07 ENCOUNTER — DOCUMENTATION (OUTPATIENT)
Dept: ENDOCRINOLOGY | Facility: HOSPITAL | Age: 75
End: 2020-07-07

## 2020-07-07 LAB
GLUCOSE SERPL-MCNC: 124 MG/DL (ref 65–140)
GLUCOSE SERPL-MCNC: 133 MG/DL (ref 65–140)
GLUCOSE SERPL-MCNC: 185 MG/DL (ref 65–140)
GLUCOSE SERPL-MCNC: 77 MG/DL (ref 65–140)

## 2020-07-07 PROCEDURE — 99255 IP/OBS CONSLTJ NEW/EST HI 80: CPT | Performed by: PHYSICIAN ASSISTANT

## 2020-07-07 PROCEDURE — 99232 SBSQ HOSP IP/OBS MODERATE 35: CPT | Performed by: INTERNAL MEDICINE

## 2020-07-07 PROCEDURE — 82948 REAGENT STRIP/BLOOD GLUCOSE: CPT

## 2020-07-07 RX ORDER — PHENAZOPYRIDINE HYDROCHLORIDE 100 MG/1
100 TABLET, FILM COATED ORAL
Status: DISCONTINUED | OUTPATIENT
Start: 2020-07-07 | End: 2020-07-08 | Stop reason: HOSPADM

## 2020-07-07 RX ADMIN — METOPROLOL SUCCINATE 100 MG: 50 TABLET, EXTENDED RELEASE ORAL at 09:16

## 2020-07-07 RX ADMIN — HEPARIN SODIUM 5000 UNITS: 5000 INJECTION INTRAVENOUS; SUBCUTANEOUS at 16:16

## 2020-07-07 RX ADMIN — INSULIN GLARGINE 30 UNITS: 100 INJECTION, SOLUTION SUBCUTANEOUS at 21:54

## 2020-07-07 RX ADMIN — PHENAZOPYRIDINE HYDROCHLORIDE 100 MG: 100 TABLET ORAL at 16:17

## 2020-07-07 RX ADMIN — PANTOPRAZOLE SODIUM 40 MG: 40 TABLET, DELAYED RELEASE ORAL at 06:13

## 2020-07-07 RX ADMIN — ASPIRIN 81 MG 81 MG: 81 TABLET ORAL at 09:16

## 2020-07-07 RX ADMIN — DOCUSATE SODIUM 100 MG: 100 CAPSULE, LIQUID FILLED ORAL at 09:16

## 2020-07-07 RX ADMIN — SERTRALINE HYDROCHLORIDE 50 MG: 50 TABLET ORAL at 09:16

## 2020-07-07 RX ADMIN — INSULIN LISPRO 10 UNITS: 100 INJECTION, SOLUTION INTRAVENOUS; SUBCUTANEOUS at 12:14

## 2020-07-07 RX ADMIN — GABAPENTIN 600 MG: 300 CAPSULE ORAL at 09:16

## 2020-07-07 RX ADMIN — HEPARIN SODIUM 5000 UNITS: 5000 INJECTION INTRAVENOUS; SUBCUTANEOUS at 21:53

## 2020-07-07 RX ADMIN — BIMATOPROST 1 DROP: 0.1 SOLUTION/ DROPS OPHTHALMIC at 21:54

## 2020-07-07 RX ADMIN — INSULIN LISPRO 10 UNITS: 100 INJECTION, SOLUTION INTRAVENOUS; SUBCUTANEOUS at 16:16

## 2020-07-07 RX ADMIN — ISOSORBIDE MONONITRATE 90 MG: 60 TABLET, EXTENDED RELEASE ORAL at 09:16

## 2020-07-07 RX ADMIN — HEPARIN SODIUM 5000 UNITS: 5000 INJECTION INTRAVENOUS; SUBCUTANEOUS at 06:13

## 2020-07-07 RX ADMIN — INSULIN LISPRO 1 UNITS: 100 INJECTION, SOLUTION INTRAVENOUS; SUBCUTANEOUS at 12:14

## 2020-07-07 NOTE — ASSESSMENT & PLAN NOTE
Irritative symptoms of dysuria and suprapubic discomfort with voiding after BCG administration 2 weeks ago  Urine cultures are repeatedly no growth  Receives maintenance BCG at [facility] q 3 months for a recurrence of his bladder cancer back in 2016  Initial diagnosis 1994 with induction BCG back then  Will add pyridium prn for use for few days- having good relief with this  PVRs are low, no evidence of retention

## 2020-07-07 NOTE — CONSULTS
Consult - Urology   Ed Mays 1945, 66 y o  male MRN: [id number]    Unit/Bed#: E5 -01 Encounter: [id number]    Cystitis due to intravesical BCG administration  Assessment & Plan  Irritated symptoms of dysuria and suprapubic discomfort with voiding after BCG administration 2 weeks ago  Will add peridium  Continue PVR/bladder scan to see that he is not in retention  Subjective/Objective     Subjective:   CC: "I just am having 8/10 burning pain with peeing "  HPI:  57-year-old male seen at [facility] where his treated with BCG for his bladder cancer  He reports his last instillation was approximately 2 weeks ago  He reports frequent UTIs and often has dysuria and suprapubic pain, pressure and severe burning after BCG administration  He continues to experience those now  He reports low level pain in between voids but well voiding has 8/10 severe burning throughout his stream   He denies any pre-hospital fever  He did not receive treatment for UTI pre-hospital   He did occasionally feeling he was incompletely emptying his bladder  He reports in the ER, bladder scan was performed and was low volume  Currently admitted with possible osteomyelitis of the spine  Urologic consultation is requested relative to ongoing dysuria  Urine culture negative on 6/24/2020 and 6/29/2020  ROS:  Review of Systems   Constitutional: Negative for activity change and appetite change  HENT: Negative for congestion and ear pain  Eyes: Negative for pain  Respiratory: Negative for cough and shortness of breath  Cardiovascular: Negative for chest pain and palpitations  Gastrointestinal: Negative for abdominal distention, abdominal pain, blood in stool, constipation, diarrhea and nausea  Genitourinary: Positive for difficulty urinating and dysuria  Musculoskeletal: Negative for arthralgias and myalgias  Skin: Negative for rash     Allergic/Immunologic: Negative for immunocompromised state  Neurological: Negative for dizziness and headaches  Hematological: Negative for adenopathy  Does not bruise/bleed easily  Psychiatric/Behavioral: Negative for agitation  The patient is not nervous/anxious  Objective:  Vitals: Blood pressure 153/89, pulse 57, temperature 97 8 °F (36 6 °C), temperature source Temporal, resp  rate 18, height 6' 4" (1 93 m), weight 108 kg (238 lb 12 1 oz), SpO2 99 %  ,Body mass index is 29 06 kg/m²  No intake or output data in the 24 hours ending 07/07/20 1512    Invasive Devices     Peripheral Intravenous Line            Peripheral IV 07/04/20 Left;Proximal;Ventral (anterior) Forearm 3 days                Physical Exam   Constitutional: He is oriented to person, place, and time  He appears well-developed and well-nourished  He is cooperative  He does not appear ill  No distress  13-year-old male, out of bed to the chair  Pleasant, chatty, providing his own history  HENT:   Head: Normocephalic and atraumatic  Moist mucous membranes  Eyes: Conjunctivae and EOM are normal    Neck: Normal range of motion  Neck supple  No tracheal deviation present  Cardiovascular: Normal rate, regular rhythm and normal heart sounds  No murmur heard  Pulmonary/Chest: Effort normal and breath sounds normal  No respiratory distress  He has no wheezes  Good airflow bilaterally on deep inspiration  Abdominal: Soft  Bowel sounds are normal  He exhibits no distension and no mass  There is no tenderness  Abdomen soft and benign  No suprapubic tenderness or fullness  Genitourinary: Right testis shows no mass, no swelling and no tenderness  Left testis shows no mass, no swelling and no tenderness  Uncircumcised  No hypospadias, penile erythema or penile tenderness  No discharge found  Musculoskeletal: Normal range of motion  He exhibits no edema  Neurological: He is alert and oriented to person, place, and time  Skin: Skin is warm and dry   No rash noted  He is not diaphoretic  No erythema  No pallor  Psychiatric: He has a normal mood and affect  His behavior is normal  Judgment and thought content normal    Nursing note and vitals reviewed  History:    Past Medical History:   Diagnosis Date    Anemia     Last assessed: 9/28/17    Arteriosclerotic cardiovascular disease     Last assessed: 9/28/17    [facility] [address] [facility]     Cancer ([facility] )     bladder    Cardiac disease     Diabetes mellitus ([facility] )     Hypertension     Hypotension     Last assessed: 2/24/15    Insomnia     Last assessed: 11/14/12    Other seasonal allergic rhinitis     Last assessed: 2/10/16    Transient cerebral ischemia      Past Surgical History:   Procedure Laterality Date    CARDIAC SURGERY      Cath stent placement  Last assessed: 3/9/17  Interventional Catheterization    CHOLECYSTECTOMY      [facility]      [facility]  Last assessed: 12/1/14    CYSTOURETHROSCOPY      w/cautery  [facility]    GASTRIC BYPASS      For morbid obesity w/Shaji-en-Y  Resolved: 11/17/09    INCISION AND DRAINAGE OF WOUND Right 2/26/2017    Procedure: INCISION AND DRAINAGE (I&D) EXTREMITY WITH APPLICATION OF GRAFT JACKET;  Surgeon: Ophelia Mars DPM;  [facility];  Service:     INCISION AND DRAINAGE OF WOUND Right 4/25/2017    Procedure: INCISION AND DRAINAGE (I&D) EXTREMITY, APPLICATION OF GRAFT;  Surgeon: Ophelia Mars DPM;  [facility];  Service:     IR IMAGE GUIDED BIOPSY/ASPIRATION  7/2/2020    JOINT REPLACEMENT      Knee   Last assessed: 1/28/11    MN CYSTOURETHROSCOPY,BIOPSY N/A 8/16/2016    Procedure: [place];  Surgeon: Justo Avelar MD;  [facility];  Service: Urology    ROTATOR CUFF REPAIR      SMALL INTESTINE SURGERY      Surgery Shaji-en-Y    SPINAL FUSION      VAC DRESSING APPLICATION Right 4/11/4409    Procedure: APPLICATION VAC DRESSING;  Surgeon: Ophelia Mars DPM; [facility];  Service:      Family History   Problem Relation Age of Onset    Diabetes Mother     Heart disease Mother     Other Mother         High blood pressure    Heart disease Father     Diabetes Sister     Other Sister         High blood pressure    Kidney disease Sister     Heart disease Brother     Other Brother         High blood pressure     Social History     Socioeconomic History    Marital status: /Civil Union     Spouse name: None    Number of children: None    Years of education: None    Highest education level: None   Occupational History    None   Social Needs    Financial resource strain: None    Food insecurity:     Worry: None     Inability: None    Transportation needs:     Medical: None     Non-medical: None   Tobacco Use    Smoking status: Former Smoker    Smokeless tobacco: Never Used   Substance and Sexual Activity    Alcohol use: Never     Frequency: 2-3 times a week     Drinks per session: 10 or more    Drug use: Never    Sexual activity: None   Lifestyle    Physical activity:     Days per week: None     Minutes per session: None    Stress: None   Relationships    Social connections:     Talks on phone: None     Gets together: None     Attends [employer] service: None     Active member of club or organization: None     Attends meetings of clubs or organizations: None     Relationship status: None    Intimate partner violence:     Fear of current or ex partner: None     Emotionally abused: None     Physically abused: None     Forced sexual activity: None   Other Topics Concern    None   Social History Narrative    None     Imaging:  CT 6/29/2020 with diffuse urinary bladder wall thickening with new surrounding inflammatory change, suspicious for infection  Prostatomegaly  Imaging reviewed - both report and images personally reviewed  Lab Results:  I have personally reviewed pertinent labs    Results from last 7 days   Lab Units 07/06/20  3958 07/03/20  0434 07/02/20  0507   WBC Thousand/uL 6 99 7 78 8 59   HEMOGLOBIN g/dL 10 8* 10 4* 11 1*   PLATELETS Thousands/uL 178 173 191     Results from last 7 days   Lab Units 07/06/20  0456 07/03/20  0434 07/02/20  0507   SODIUM mmol/L 139 137 138   POTASSIUM mmol/L 4 1 4 3 4 3   CHLORIDE mmol/L 106 105 105   CO2 mmol/L 23 21 23   BUN mg/dL 37* 44* 41*   CREATININE mg/dL 2 30* 2 33* 2 42*   EGFR ml/min/1 73sq m 31 30 29   CALCIUM mg/dL 8 5 8 6 8 8   AST U/L  --  38  --    ALT U/L  --  67  --    ALK PHOS U/L  --  84  --      Results from last 7 days   Lab Units 07/02/20  1004   GRAM STAIN RESULT  Rare Polys  No bacteria seen   BODY FLUID CULTURE, STERILE  No growth           Coco Dill PA-C  Date: 7/7/2020 Time: 3:12 PM

## 2020-07-07 NOTE — SOCIAL WORK
LOS 7 days; pt is not a readmission; pt is not a bundle; risk of readmissions score is low  CM met with pt and wife Preethi Lewis at bedside  Pt lives with wife in 2 story home with 5 steps to enter home  He was independently PTA  Pt has a cane and stair glide  at home; bedroom and bathroom are on second floor but pt states he can climb steps as needed  No current VNA however pt has hx of being home on IV ABX and with wound vac on foot at home  Wife reports there were cost issues with home IV ABX in the past due to Medicare not covering IV ABX  Pt has Rx plan through [employer] with a high deductible  Pt reports he can afford his standing home meds  PCP is Dr Benito Hall  Pt denied hx of drug and alcohol use or mental health issues  Wife will transport home at d/c

## 2020-07-07 NOTE — PROGRESS NOTES
[facility] Progress Note  Patient: Jaida Ambrose 66 y o  male   MRN: [id number]  PCP: Johnnye Castleman, MD  Unit/Bed#: E5 -01 Encounter: [id number]  Date Of Visit: 07/07/20  Addendum: pt called me back to see him due to 9/10 pain with urination  Will have urology eval him  Assessment/plan  1  Low back pain with abnormal MRI of lumbar spine- appreciate ID recommendations  Pt has a history of low back pain that had worsened  He is s/p ct on admission that suggested discitis  Esr was 23 and crp 9 9  He had received ceftriaxone and than vanco with zosyn  Further antibiotics were held and he under went a biopsy by IR  Biopsy was negative for infection but it was incomplete  Reviewed with neurosx and IR  Will obtain a gallium wbc nuclear medicine test  Pt will need repeat MRI of lumbar spine with contrast on Thursday (although may not be able to obtain due to ckd4) with possible repeat biopsy on Friday  Discussed with pt  Will continue to monitor pt off of antibiotics       2  Dysuria- symptoms started after receiving bcg injections which is a recurring problem with each cystoscopy bcg procedure  Urine culture was negative  He will discuss with [facility] urology team in regards to these recurring symptoms to find alternative ways of treatment that does not require repeated antibiotics       3  ckd4- stable       4  Malignant tumor of urinary bladder- follow up with kailey alvarez  Subjective:   Pt seen and examined  Pt states his back pain has not worsened  He has been able to ambulate better  He still has some urinary frequency  No f/c no cp no sob no n/v/d no abd pain  Objective:     Vitals: Blood pressure 153/89, pulse 57, temperature 97 8 °F (36 6 °C), temperature source Temporal, resp  rate 18, height 6' 4" (1 93 m), weight 108 kg (238 lb 12 1 oz), SpO2 99 %  ,Body mass index is 29 06 kg/m²      Lab, Imaging and other studies:  Results from last 7 days   Lab Units 07/06/20  5646   WBC Thousand/uL 6 99   HEMOGLOBIN g/dL 10 8*   HEMATOCRIT % 31 6*   PLATELETS Thousands/uL 178     Results from last 7 days   Lab Units 07/06/20  0456 07/03/20  0434   POTASSIUM mmol/L 4 1 4 3   CHLORIDE mmol/L 106 105   CO2 mmol/L 23 21   BUN mg/dL 37* 44*   CREATININE mg/dL 2 30* 2 33*   CALCIUM mg/dL 8 5 8 6   ALK PHOS U/L  --  84   ALT U/L  --  67   AST U/L  --  38         Lab Results   Component Value Date    BLOODCX No Growth After 5 Days  06/29/2020    BLOODCX No Growth After 5 Days  06/29/2020    BLOODCX No Growth After 5 Days  05/12/2017    URINECX No Growth <1000 cfu/mL 06/29/2020    URINECX No Growth <1000 cfu/mL 06/24/2020    URINECX No Growth <1000 cfu/mL 06/09/2020    WOUNDCULT 4+ Growth of Staphylococcus aureus 04/21/2017    WOUNDCULT 4+ Growth of Beta Hemolytic Streptococcus Group G 04/21/2017    WOUNDCULT 4+ Growth of Diphtheroids 04/21/2017         Ct Chest Abdomen Pelvis Wo Contrast    Result Date: 6/29/2020  Narrative: CT CHEST, ABDOMEN AND PELVIS WITHOUT IV CONTRAST INDICATION:   abnormal xray  COMPARISON:  9/3/2019 TECHNIQUE: CT examination of the chest, abdomen and pelvis was performed without intravenous contrast   Axial, sagittal, and coronal 2D reformatted images were created from the source data and submitted for interpretation  Radiation dose length product (DLP) for this visit:  846 mGy-cm   This examination, like all CT scans performed in the [facility], was performed utilizing techniques to minimize radiation dose exposure, including the use of iterative reconstruction and automated exposure control  Enteric contrast was administered  FINDINGS: CHEST LUNGS:  No suspicious pulmonary nodules are identified to correspond to the abnormality on prior radiograph  The central airways are patent  PLEURA:  Unremarkable  HEART/GREAT VESSELS:  There is coronary artery calcification  Heart is normal in size  There is no pericardial effusion  MEDIASTINUM AND DIMAS:  Unremarkable  CHEST WALL AND LOWER NECK:   Unremarkable  ABDOMEN LIVER/BILIARY TREE:  Unremarkable  GALLBLADDER:  Gallbladder is surgically absent  SPLEEN:  Unremarkable  PANCREAS:  Unremarkable  ADRENAL GLANDS:  Unremarkable  KIDNEYS/URETERS:  One or more simple renal cyst(s) is noted  Otherwise unremarkable kidneys  No hydronephrosis  STOMACH AND BOWEL:  Status post gastric bypass  There is no bowel obstruction  There is colonic diverticulosis without evidence for diverticulitis  APPENDIX:  No findings to suggest appendicitis  ABDOMINOPELVIC CAVITY:  No ascites  No pneumoperitoneum  No lymphadenopathy  VESSELS:  Atherosclerotic changes are present  No evidence of aneurysm  PELVIS REPRODUCTIVE ORGANS:  Prostate gland is enlarged measuring up to 6 7 cm  URINARY BLADDER:  There is urinary bladder wall thickening with surrounding inflammatory change  Findings likely represent a combination of infection superimposed on a background of the patient's known bladder neoplasm  ABDOMINAL WALL/INGUINAL REGIONS:  Probable injection sites are noted within the ventral abdominal wall as previously suggested  OSSEOUS STRUCTURES:  There are degenerative changes of the spine  Correlate with separate CT lumbar spine report for the remainder the findings  Impression: No focal airspace consolidation or suspicious nodule to correlate with findings on prior chest x-ray  Diffuse urinary bladder wall thickening with new surrounding inflammatory change  Findings are suspicious for infection superimposed on background of the patient's known bladder neoplasm  Enlarged prostate gland  Remainder of the findings, as described above  Workstation performed: [id number]     Xr Chest 2 Views    Result Date: 6/9/2020  Narrative: CHEST INDICATION:   chills  COMPARISON:  7/25/2015 and 2/22/2017 EXAM PERFORMED/VIEWS:  XR CHEST PA & LATERAL  The frontal view was performed utilizing dual energy radiographic technique   Images: 4 FINDINGS: Cardiomediastinal silhouette appears unremarkable  Linear/oblong triangular shaped density in the right lower lobe not seen on prior exams likely represents scarring or atelectasis however recommend elective chest CT for confirmation of benignity  The lungs are otherwise clear  No pneumothorax or pleural effusion  Osseous structures appear within normal limits for patient age  Impression: Linear/oblong triangular shaped density in the right lower lobe not seen on prior exams likely represents scarring or atelectasis however recommend elective chest CT for confirmation of benignity  No effusion, airspace disease or pneumothorax  The study was marked in [facility] for immediate notification  Workstation performed: [id number]     Mri Lumbar Spine Wo Contrast    Result Date: 7/1/2020  Narrative: MRI LUMBAR SPINE WITHOUT CONTRAST INDICATION: possible infected L spine  COMPARISON:  CT 6/29/2020, x-ray 12/26/2019, MR 1/31/2019 TECHNIQUE:  Sagittal T1, sagittal T2, sagittal inversion recovery, axial T1 and axial T2, coronal T2   IMAGE QUALITY:  Diagnostic FINDINGS: VERTEBRAL BODIES:  There are 5 nonrib bearing lumbar type vertebral bodies  Dextroscoliosis L2-3 apex, straightening of normal lumbar lordosis, relative retrolisthesis of L4 on L5 with pseudarthrosis between the posterior bodies stable  Leftward translation of L1, mild rightward translation of L3  New marrow edema is present at the opposing endplates at the J6-6 level  The findings are worrisome for early discitis osteomyelitis  Although there is no phlegmon and there is no epidural inflammation  Chronic reactive endplate changes to the left at the L2-L3 level  SACRUM:  Normal signal within the sacrum  No evidence of insufficiency or stress fracture  DISTAL CORD AND CONUS:  Normal size and signal within the distal cord and conus  Conus terminates at the T12 level  PARASPINAL SOFT TISSUES:  Paraspinal soft tissues are unremarkable    Multiple stable bilateral renal cysts  LOWER THORACIC DISC SPACES:  Normal disc height and signal   No disc herniation, canal stenosis or foraminal narrowing  Bulging disc T12-L1, right facet arthrosis T11-T12, no critical stenosis  LUMBAR DISC SPACES: L1-L2:  Progressive loss of disc height, new endplate irregularity, marrow edema within the opposing vertebral bodies most notable to the left  There is also fluid in the anterior aspect of the disc  Findings consistent with early discitis/osteomyelitis  No paraspinal phlegmon  Bilateral laminectomy  L2-L3:  Asymmetric loss of disc height to the left, marginal osteophytes, advanced bilateral facet arthrosis, circumferential bulge and mild narrowing of the lateral recesses  L3-L4:  Nitrogenous degeneration of the nucleus, marginal osteophytes, advanced facet arthrosis  Disc extends into the foramen bilaterally without root compression  Circumferential bulging the discs with stable central protrusion, producing mild canal stenosis  L4-L5:  L4 is retrolisthesis on to the collapsed the posterior-superior L5 vertebral body with alteration of normal horizontal plane of the disc  Advanced bilateral facet arthrosis with severe right greater than left foraminal stenosis  Correlate for L4 radiculitis  Disc bulges circumferentially with prolapse caudally, narrowing the lateral recesses  Correlate for L5 radiculitis  L5-S1:  Moderate bilateral facet arthrosis  Impression: New changes at the L1-L2 level consistent with early discitis osteomyelitis  No paraspinal or epidural phlegmon  Pre-existing severe lumbar spondylosis and osteoarthritis  Stable Relative retrolisthesis of L4 on the collapsed L5 vertebral body resulting in significant foraminal and lateral recess stenosis  Correlate for bilateral L4 and bilateral L5 radiculitis  The study was marked in EPIC for significant notification   Workstation performed: [id number]     Ir Image Guided Biopsy/aspiration    Result Date: 7/2/2020  Narrative: IR IMAGE GUIDED BIOPSY/ASPIRATION Indication: Abnormal  imaging suggestive of lumbar discitis osteomyelitis  Procedure and findings: The procedure was performed in the prone position  IV sedation was given   imaging of the lumbar spine was performed  Appropriate level was identified in 2 projections and confirmed with Dr Lawrence Alberto from the department of radiology  Patient has a transitional lumbosacral junction and therefore the involved level was described as L1-2  However, counting nonrib bearing vertebrae from the top down, the level could be described as L2-3  Endplate irregularity and widening is seen fluoroscopically  Left paralumbar approach was used to place an 18-gauge needle to the outer edge of the disc space posteriorly and coaxial aspiration was performed with a 22-gauge Chiba needle  Several passes were made yielding small fragments  of disc material   No fluid was obtained  This material was placed into sterile container with preservative-free saline  Finally, needle agitation was performed with an 18-gauge needle and small fragments of tissue and droplets of blood were obtained and also submitted for analysis  Routine culture and AFB was requested with Gram stain  The puncture site was cleansed and a dressing was applied  Fluoroscopy time (min) : 4 5 MIN Images: 6 Contrast (ml) : None  Sedation (min) : 25 minutes     Impression: Impression: Technically successful lumbar this aspiration using fluoroscopic guidance  Depending on nomenclature, involved level could be described as L1-2 or L2-3 due to transitional lumbosacral junction  Involved disc space is between the 2nd and 3rd non-rib bearing vertebrae  Workstation performed: [id number]     Ct Recon Only Lumbar Spine (no Charge)    Result Date: 6/29/2020  Narrative: CT LUMBAR SPINE INDICATION:   pain h/o cancer and back pain   COMPARISON:  CT from 9/3/2019 and 1/31/2019 TECHNIQUE: Axial CT examination of the lumbar spine was obtained utilizing reconstructed images from CT of the chest, abdomen and pelvis performed the same day  Images were reformatted in the sagittal and coronal planes  This examination, like all CT scans performed in the [facility], was performed utilizing techniques to minimize radiation dose exposure, including the use of iterative reconstruction and automated exposure control  FINDINGS: ALIGNMENT: There is no acute fracture  There is trace retrolisthesis of L5-S1  There is scoliosis  VERTEBRAL BODIES: There is mild chronic wedging of the L5 vertebral body, stable  DEGENERATIVE CHANGES: In the interval since the prior examination there has been interval development of endplate irregularity at L1-L2  This is superimposed on a background of previously seen vacuum phenomenon at this level  There is ventral and dorsal osteophytosis noted  There is a laminectomy defect at this level  At L2-L3 there is disc space narrowing with a bulging annulus and dorsal osteophytosis  There is marginal osteophytosis  There is facet arthrosis  There is mild to moderate bony canal stenosis and mild foraminal narrowing  At L3-L4 there is vacuum disc phenomenon  There is a bulging annulus  There is dorsal and ventral osteophyte ptosis  There is marginal osteophytosis  There is facet arthrosis  There is moderate canal stenosis  There is moderate foraminal narrowing  At L4-L5 there is a bulging annulus  There is dorsal osteophytosis  There is facet arthrosis  There is marginal osteophytosis  There is mild canal stenosis  There is severe bilateral foraminal narrowing  At L5-S1 there is facet arthrosis  There is no significant bony canal stenosis or foraminal narrowing  PREVERTEBRAL AND PARASPINAL SOFT TISSUES: There is no significant prevertebral soft tissue swelling  Impression: Interval development of endplate irregularity at L1-L2 with loss of vacuum disc phenomena at this level  Findings raise the possibility for infection superimposed on the background of previously seen degenerative disc disease  Correlation with MRI as well as ESR levels is recommended for further evaluation  Multilevel degenerative changes of the lumbar spine, as described above  I personally discussed this study with Dr Macrina Abraham on 6/29/2020 at 4:42 PM  Workstation performed: [id number]       Scheduled Meds:   Current Facility-Administered Medications:  acetaminophen 650 mg Oral Q6H PRN Rahel Lark, PA-C   ALPRAZolam 0 25 mg Oral BID PRN Rahel Lark, PA-C   aspirin 81 mg Oral Daily Lynda Piger, PA-C   bimatoprost 1 drop Both Eyes HS Lynda Piger, PA-C   docusate sodium 100 mg Oral Daily Dorothy Tripp MD   doxylamine 25 mg Oral HS PRN Rahel Lark, PA-C   gabapentin 600 mg Oral Daily Lynda Piger, PA-C   heparin (porcine) 5,000 Units Subcutaneous Q8H [facility] Lynda Piger, PA-C   insulin glargine 30 Units Subcutaneous HS Lynda Piger, PA-C   insulin lispro 1-6 Units Subcutaneous TID AC Lynda Piger, PA-C   insulin lispro 1-6 Units Subcutaneous HS Lynda Piger, PA-C   insulin lispro 10 Units Subcutaneous TID With Meals Rahel Lark, PA-C   isosorbide mononitrate 90 mg Oral Daily Lynda Piger, PA-C   metoprolol succinate 100 mg Oral Daily Lynda Piger, PA-C   ondansetron 4 mg Intravenous Q6H PRN Rahel Larhank, PA-C   oxyCODONE 5 mg Oral Q6H PRN Dorothy Tripp MD   pantoprazole 40 mg Oral Early Morning Lynda Piger, PA-C   sertraline 50 mg Oral Daily Lynda Piger, PA-C     Continuous Infusions:    PRN Meds:   acetaminophen    ALPRAZolam    doxylamine    ondansetron    oxyCODONE      Physical exam:  Physical Exam  General appearance: alert and oriented, in no acute distress  Head: Normocephalic, without obvious abnormality, atraumatic  Eyes: conjunctivae/corneas clear  PERRL, EOM's intact  Fundi benign    Neck: no adenopathy, no carotid bruit, no JVD, supple, symmetrical, trachea midline and thyroid not enlarged, symmetric, no tenderness/mass/nodules  Lungs: clear to auscultation bilaterally  Heart: regular rate and rhythm, S1, S2 normal, no murmur, click, rub or gallop  Abdomen: soft, non-tender; bowel sounds normal; no masses,  no organomegaly  Extremities: extremities normal, warm and well-perfused; no cyanosis, clubbing, or edema  Pulses: 2+ and symmetric  Skin: Skin color, texture, turgor normal  No rashes or lesions  Neurologic: Mental status: Alert, oriented, thought content appropriate      VTE Pharmacologic Prophylaxis: Heparin  VTE Mechanical Prophylaxis: sequential compression device    Counseling / Coordination of Care  Total floor / unit time spent today 20 minutes    Current Length of Stay: 8 day(s)    Current Patient Status: Inpatient     Code Status: Level 1 - Full Code

## 2020-07-07 NOTE — PLAN OF CARE
Problem: PAIN - ADULT  Goal: Verbalizes/displays adequate comfort level or baseline comfort level  Description  Interventions:  - Encourage patient to monitor pain and request assistance  - Assess pain using appropriate pain scale  - Administer analgesics based on type and severity of pain and evaluate response  - Implement non-pharmacological measures as appropriate and evaluate response  - Consider cultural and social influences on pain and pain management  - Notify physician/advanced practitioner if interventions unsuccessful or patient reports new pain  Outcome: Adequate for Discharge     Problem: INFECTION - ADULT  Goal: Absence or prevention of progression during hospitalization  Description  INTERVENTIONS:  - Assess and monitor for signs and symptoms of infection  - Monitor lab/diagnostic results  - Monitor all insertion sites, i e  indwelling lines, tubes, and drains  - Monitor endotracheal if appropriate and nasal secretions for changes in amount and color  - [place] appropriate cooling/warming therapies per order  - Administer medications as ordered  - Instruct and encourage patient and family to use good hand hygiene technique  - Identify and instruct in appropriate isolation precautions for identified infection/condition  Outcome: Adequate for Discharge  Goal: Absence of fever/infection during neutropenic period  Description  INTERVENTIONS:  - Monitor WBC    Outcome: Adequate for Discharge     Problem: SAFETY ADULT  Goal: Patient will remain free of falls  Description  INTERVENTIONS:  - Assess patient frequently for physical needs  -  Identify cognitive and physical deficits and behaviors that affect risk of falls    -  [place] fall precautions as indicated by assessment   - Educate patient/family on patient safety including physical limitations  - Instruct patient to call for assistance with activity based on assessment  - Modify environment to reduce risk of injury  - Consider OT/PT consult to assist with strengthening/mobility  Outcome: Adequate for Discharge  Goal: Maintain or return to baseline ADL function  Description  INTERVENTIONS:  -  Assess patient's ability to carry out ADLs; assess patient's baseline for ADL function and identify physical deficits which impact ability to perform ADLs (bathing, care of mouth/teeth, toileting, grooming, dressing, etc )  - Assess/evaluate cause of self-care deficits   - Assess range of motion  - Assess patient's mobility; develop plan if impaired  - Assess patient's need for assistive devices and provide as appropriate  - Encourage maximum independence but intervene and supervise when necessary  - Involve family in performance of ADLs  - Assess for home care needs following discharge   - Consider OT consult to assist with ADL evaluation and planning for discharge  - Provide patient education as appropriate  Outcome: Adequate for Discharge  Goal: Maintain or return mobility status to optimal level  Description  INTERVENTIONS:  - Assess patient's baseline mobility status (ambulation, transfers, stairs, etc )    - Identify cognitive and physical deficits and behaviors that affect mobility  - Identify mobility aids required to assist with transfers and/or ambulation (gait belt, sit-to-stand, lift, walker, cane, etc )  - [place] fall precautions as indicated by assessment  - Record patient progress and toleration of activity level on Mobility SBAR; progress patient to next Phase/Stage  - Instruct patient to call for assistance with activity based on assessment  - Consider rehabilitation consult to assist with strengthening/weightbearing, etc   Outcome: Adequate for Discharge     Problem: DISCHARGE PLANNING  Goal: Discharge to home or other facility with appropriate resources  Description  INTERVENTIONS:  - Identify barriers to discharge w/patient and caregiver  - Arrange for needed discharge resources and transportation as appropriate  - Identify discharge learning needs (meds, wound care, etc )  - Arrange for interpretive services to assist at discharge as needed  - Refer to Case Management Department for coordinating discharge planning if the patient needs post-hospital services based on physician/advanced practitioner order or complex needs related to functional status, cognitive ability, or social support system  Outcome: Adequate for Discharge     Problem: Knowledge Deficit  Goal: Patient/family/caregiver demonstrates understanding of disease process, treatment plan, medications, and discharge instructions  Description  Complete learning assessment and assess knowledge base  Interventions:  - Provide teaching at level of understanding  - Provide teaching via preferred learning methods  Outcome: Adequate for Discharge     Problem: Potential for Falls  Goal: Patient will remain free of falls  Description  INTERVENTIONS:  - Assess patient frequently for physical needs  -  Identify cognitive and physical deficits and behaviors that affect risk of falls    -  [place] fall precautions as indicated by assessment   - Educate patient/family on patient safety including physical limitations  - Instruct patient to call for assistance with activity based on assessment  - Modify environment to reduce risk of injury  - Consider OT/PT consult to assist with strengthening/mobility  Outcome: Adequate for Discharge

## 2020-07-07 NOTE — PROGRESS NOTES
Progress Note - Infectious Disease   Amanda Yip 66 y o  male MRN: [id number]  Unit/Bed#: E5 -01 Encounter: [id number]    Impression/Plan:  1  Abnormal lumbar spine CT   Patient's CT of the lumbar spine showed endplate irregularity from L1-L2  Unclear if this was same endplate edema of H0-Q4 SEVJR his laminectomy that was seen on MRI in 1/2020   Patient completed a follow up MRI  Results showed evidence of progression and possible diskitis/osteomyelitis  There was a small fluid collection over the anterior L1-L2 region   Patient was seen in IR for culture, fungal culture, and AFB  No patholody was sent  Culture, AFB, and fungal culture are all preliminarily negative  Patient is afebrile without leukocytosis  He is neurologically intact  Neurosurgery and IR have continued to follow  Ultimately patient would benefit from definitive diagnosis of osteomyelitis before starting long term antibiotics  Patient will proceed with a Ceretec scan before decision is made on repeat IR biopsy vs open biopsy with neurosurgery  -monitor patient off antibiotics   -monitor CBC and BMP  -follow up final lumbar spine culture, fungal, biopsy, and AFB  -follow up ceretec scan  -anticipate eventual repeat IR biopsy vs open biopsy with neurosurgery  -monitor back pain     2  Dysuria   Patient reports his symptoms occurred after receiving BCG injection  He describes this is a recurring problem with each BCG/cystoscopy procedure that he goes through   BCG injections are known to cause bladder pain and spasms   He has had recurring urine cultures which are negative but has repeatedly received antibiotic treatment despite these findings  I recommend the patient have conversations with his urology team at [facility] regarding his recurring symptoms and alternative ways for management as repeated antibiotic treatment puts patient at risk for resistant organisms and renal toxicities  Patient is afebrile without leukocytosis   New urine and blood cultures were negative   No indication for ongoing antibiotic treatment at this time     -monitor patient off antibiotics  -monitor CBC and BMP  -monitor  symptoms  -monitor urine output  -ongoing follow-up as an outpatient with [facility]     3  CKD stage 4  Upon review of patient's available past medical records it appears his baseline creatinine is generally 2 2-2 5   Patient's creatinine was 2 62 upon admission  He is stable with last creatinine = 2 3   -monitor BMP  -avoid nephrotoxins     4  Bladder cancer  Nikos Vick is currently in care at [address] initially diagnosed in 1994 to [facility]  He underwent several TURBT and a few rounds of BCG  He followed up with annual endoscopic surveillance which had been negative until new findings were noted in July 2016  He then underwent additioanl TURBT and has had ongoing BCG+INF treatments since   -continue outpatient follow-up with [facility] after discharge     5  Diarrhea  Patient with multiple watery stools after admission  C-diff PCR was sent and was negative  Consider the diarrhea may be side effect of recent broad spectrum antibiotics  Fortunately his diarrhea has improved  He has no other GI complaints at this time and is tolerating oral intake without difficulty   -serial abdominal exams  -monitor GI symptoms  -monitor stool output    Above plan was discussed in detail with patient at the bedside  Above plan was discussed in detail with [facility] attending, Dr Juan Antonio Lloyd  Antibiotics:  No current antibiotic use  Subjective:  Patient reports he is feeling well  His back pain remains unchanged in intensity and is intermittent  He is not having difficulty with worsening leg strength or ambulation today  He has no fever, chills, sweats, shakes; no nausea, vomiting, abdominal pain, or diarrhea; no cough, shortness of breath, or chest pain  No new symptoms      Objective:  Vitals:  Temp:  [97 °F (36 1 °C)-97 9 °F (36 6 °C)] 97 8 °F (36 6 °C)  HR:  [57-65] 57  Resp:  [18] 18  BP: (115-158)/(75-92) 153/89  SpO2:  [99 %-100 %] 99 %  Temp (24hrs), Av 6 °F (36 4 °C), Min:97 °F (36 1 °C), Max:97 9 °F (36 6 °C)  Current: Temperature: 97 8 °F (36 6 °C)    Physical Exam:   General Appearance:  Alert, interactive, nontoxic, no acute distress  Patient appears comfortable supine in bed  Throat: Oropharynx moist without lesions  Lungs:   Clear to auscultation bilaterally; no wheezes, rhonchi or rales; respirations unlabored; patient is on room air   Heart:  Bradycardia; no murmur, rub or gallop   Abdomen:   Soft, non-tender, non-distended, positive bowel sounds  Extremities: No clubbing or cyanosis, no edema   Skin: No new rashes, lesions, or draining wounds noted on exposed skin       Labs, Imaging, & Other studies:   All pertinent labs and imaging studies were personally reviewed  Results from last 7 days   Lab Units 20  0456 20  0434 20  0507   WBC Thousand/uL 6 99 7 78 8 59   HEMOGLOBIN g/dL 10 8* 10 4* 11 1*   PLATELETS Thousands/uL 178 173 191     Results from last 7 days   Lab Units 20  0456 20  0434   POTASSIUM mmol/L 4 1 4 3   CHLORIDE mmol/L 106 105   CO2 mmol/L 23 21   BUN mg/dL 37* 44*   CREATININE mg/dL 2 30* 2 33*   EGFR ml/min/1 73sq m 31 30   CALCIUM mg/dL 8 5 8 6   AST U/L  --  38   ALT U/L  --  67   ALK PHOS U/L  --  84     Results from last 7 days   Lab Units 20  1004 20  1805   GRAM STAIN RESULT  Rare Polys  No bacteria seen  --    BODY FLUID CULTURE, STERILE  No growth  --    C DIFF TOXIN B   --  Negative         Results from last 7 days   Lab Units 20  0507   CRP mg/L 9 9*

## 2020-07-07 NOTE — CASE MANAGEMENT
This case management assistant met with the patient and reviewed his Medicare Rights with him  He signed the document

## 2020-07-08 VITALS
HEART RATE: 71 BPM | TEMPERATURE: 97 F | OXYGEN SATURATION: 97 % | BODY MASS INDEX: 29.07 KG/M2 | WEIGHT: 238.76 LBS | RESPIRATION RATE: 18 BRPM | DIASTOLIC BLOOD PRESSURE: 70 MMHG | SYSTOLIC BLOOD PRESSURE: 122 MMHG | HEIGHT: 76 IN

## 2020-07-08 LAB
ANION GAP SERPL CALCULATED.3IONS-SCNC: 8 MMOL/L (ref 4–13)
BUN SERPL-MCNC: 40 MG/DL (ref 5–25)
CALCIUM SERPL-MCNC: 8.7 MG/DL (ref 8.3–10.1)
CHLORIDE SERPL-SCNC: 106 MMOL/L (ref 100–108)
CO2 SERPL-SCNC: 25 MMOL/L (ref 21–32)
CREAT SERPL-MCNC: 2.34 MG/DL (ref 0.6–1.3)
GFR SERPL CREATININE-BSD FRML MDRD: 30 ML/MIN/1.73SQ M
GLUCOSE SERPL-MCNC: 118 MG/DL (ref 65–140)
GLUCOSE SERPL-MCNC: 83 MG/DL (ref 65–140)
GLUCOSE SERPL-MCNC: 96 MG/DL (ref 65–140)
POTASSIUM SERPL-SCNC: 4.4 MMOL/L (ref 3.5–5.3)
SODIUM SERPL-SCNC: 139 MMOL/L (ref 136–145)

## 2020-07-08 PROCEDURE — 99239 HOSP IP/OBS DSCHRG MGMT >30: CPT | Performed by: INTERNAL MEDICINE

## 2020-07-08 PROCEDURE — 97530 THERAPEUTIC ACTIVITIES: CPT

## 2020-07-08 PROCEDURE — 97116 GAIT TRAINING THERAPY: CPT

## 2020-07-08 PROCEDURE — 99232 SBSQ HOSP IP/OBS MODERATE 35: CPT | Performed by: INTERNAL MEDICINE

## 2020-07-08 PROCEDURE — 80048 BASIC METABOLIC PNL TOTAL CA: CPT | Performed by: INTERNAL MEDICINE

## 2020-07-08 PROCEDURE — 99232 SBSQ HOSP IP/OBS MODERATE 35: CPT | Performed by: PHYSICIAN ASSISTANT

## 2020-07-08 PROCEDURE — 82948 REAGENT STRIP/BLOOD GLUCOSE: CPT

## 2020-07-08 RX ADMIN — PANTOPRAZOLE SODIUM 40 MG: 40 TABLET, DELAYED RELEASE ORAL at 05:44

## 2020-07-08 RX ADMIN — GABAPENTIN 600 MG: 300 CAPSULE ORAL at 09:17

## 2020-07-08 RX ADMIN — DOCUSATE SODIUM 100 MG: 100 CAPSULE, LIQUID FILLED ORAL at 09:17

## 2020-07-08 RX ADMIN — PHENAZOPYRIDINE HYDROCHLORIDE 100 MG: 100 TABLET ORAL at 11:51

## 2020-07-08 RX ADMIN — ISOSORBIDE MONONITRATE 90 MG: 60 TABLET, EXTENDED RELEASE ORAL at 09:17

## 2020-07-08 RX ADMIN — SERTRALINE HYDROCHLORIDE 50 MG: 50 TABLET ORAL at 09:17

## 2020-07-08 RX ADMIN — PHENAZOPYRIDINE HYDROCHLORIDE 100 MG: 100 TABLET ORAL at 07:36

## 2020-07-08 RX ADMIN — INSULIN LISPRO 10 UNITS: 100 INJECTION, SOLUTION INTRAVENOUS; SUBCUTANEOUS at 11:51

## 2020-07-08 RX ADMIN — HEPARIN SODIUM 5000 UNITS: 5000 INJECTION INTRAVENOUS; SUBCUTANEOUS at 05:44

## 2020-07-08 RX ADMIN — ASPIRIN 81 MG 81 MG: 81 TABLET ORAL at 09:17

## 2020-07-08 RX ADMIN — METOPROLOL SUCCINATE 100 MG: 50 TABLET, EXTENDED RELEASE ORAL at 09:17

## 2020-07-08 NOTE — DISCHARGE SUMMARY
Discharge- Jesus Venegas 1945, 66 y o  male MRN: [id number]    Unit/Bed#: E5 -01 Encounter: [id number]    Primary Care Provider: Kurt Rivera MD   Date and time admitted to hospital: 6/29/2020  1:59 PM        * Abnormal MRI, lumbar spine  Assessment & Plan  Degenerative but cannot rule out infection  Patient has no neurologic deficit, no worsening pain or fever  CRP was only mildly elevated  Status post needle aspiration of L1-L2 with no growth or sign of infection  Discussed with ID and with patient who prefers to follow-up with his neurosurgeon at [facility] for further evaluation and treatment  Spoke with the office of Dr Franko Colon regarding his condition  I spoke with Mohini Bauer, nurse coordinator and confirmed that he has an virtual appointment on July 15, 2020  He will bring a CD of his CT and MRI images on his follow-up  At this time he will not be treated with antibiotics    Cystitis due to intravesical BCG administration  Assessment & Plan  · Observe off antibiotics since this is due to BCG instillation itself and not due to acute infection  · Urology recommendations reviewed  · They recommended Pyridium on discharge however this is contraindicated due to his chronic kidney disease stage 4    Chronic kidney disease, stage 4 (severe) ([facility])  Assessment & Plan  Stable and at baseline  Monitor closely    Malignant tumor of urinary bladder [facility])  Assessment & Plan  Ongoing treatments through [address] [place] [address] had a BCG treatment last Tuesday 6/23/19      Follow-up with [facility], [address] for further treatment    Type 2 diabetes mellitus, with long-term current use of insulin [facility])  Assessment & Plan  Lab Results   Component Value Date    HGBA1C 7 5 (H) 06/24/2020       Recent Labs     07/07/20  1544 07/07/20  2054 07/08/20  0716 07/08/20  1127   POCGLU 133 124 83 118       Blood Sugar Average: Last 72 hrs:  (P) 143 [id number]     A1c is acceptable  Blood sugars stable on Lantus 30 units at bedtime and Humalog 10 units with meals  Resume home regimen on discharge    Essential hypertension  Assessment & Plan  Continue Toprol  mg daily daily with hold parameters        Discharging Physician / Practitioner: Dorothy Tripp MD  PCP: Kallie Bartholomew MD  Admission Date:   Admission Orders (From admission, onward)     Ordered        06/29/20 2111  Inpatient Admission  Once         06/29/20 1902  Place in Observation  Once                   Discharge Date: 07/08/20    Resolved Problems  Date Reviewed: 7/5/2020          Resolved    Transaminitis 7/3/2020     Resolved by  Dorothy Tripp MD    Antibiotic-associated diarrhea 7/4/2020     Resolved by  Dorothy Tripp MD          Consultations During Hospital Stay:  · Id  · IR    Procedures Performed:   · Image guided lumbar aspiration L1-L2 on 07/02/2020    Significant Findings / Test Results:   · CT lumbar spine- interval development of endplate irregularity at L1-L2 with loss of VAC this phenomena  · MRI lumbar spine-new changes at L1-L2 consistent with early diskitis/osteomyelitis  No paraspinal or epidural phlegmon    Incidental Findings:   · See above     Test Results Pending at Discharge (will require follow up): · None     Outpatient Tests Requested:  · Neurosurgery follow-up 81/51/7174    Complications:  None    Reason for Admission:  Dysuria    Hospital Course:     Jermaine Smalls is a 66 y o  male patient who originally presented to the hospital on 6/29/2020 due to dysuria  He has known history of bladder cancer status post recent BCG instillation  Due to concern for urinary tract infection he was told by his specialists Maria Victoria Crowley to come to the hospital for further evaluation and treatment  CT of the abdomen on admission showed diffuse urinary bladder wall thickening with new surrounding inflammatory change  He was initially placed on empiric antibiotics and was seen by infectious disease    None further evaluation, it was felt that his dysuria was due to the BCG installation itself and not due to a secondary bacterial infection  For this reason antibiotics for dysuria was discontinued  His CT scan on admission incidentally showed suspected inflammation of the L1-L2 region for which an MRI was done for follow-up  MRI showed possible early diskitis and osteomyelitis of this lesion  IR was consulted and aspiration was performed  So far result showed no infection  Further discussion was done between ID and neuro surgery, Id and radiology  Abnormal MRI could be due to degenerative change (especially since his CRP was only mildly elevated and results of the aspiration or negative) however infection could not be ruled out  A Ceretec scan was ordered however the patient refused and prefers to pursue further evaluation and treatment through his neurosurgeon at [facility]  Since the aspiration was unrevealing, he was not treated with antibiotics for this  He has a scheduled follow-up with his neurosurgeon on July 15, 2020  He was advised to return to the hospital should he have any symptoms of fever/chills/worsening pain/or neurologic deficit  Please see above list of diagnoses and related plan for additional information  Condition at Discharge: good     Discharge Day Visit / Exam:     Subjective:  Feels eager to go home  No worsening pain fever or chills  Improved dysuria  No weakness or numbness of the legs  Vitals: Blood Pressure: 122/70 (07/08/20 1534)  Pulse: 71 (07/08/20 1534)  Temperature: (!) 97 °F (36 1 °C) (07/08/20 1534)  Temp Source: Temporal (07/08/20 1534)  Respirations: 18 (07/08/20 1534)  Height: 6' 4" (193 cm) (06/30/20 0241)  Weight - Scale: 108 kg (238 lb 12 1 oz) (06/29/20 1331)  SpO2: 97 % (07/08/20 1534)  Exam:   Physical Exam   Constitutional: He is oriented to person, place, and time  He appears well-developed  No distress  HENT:   Head: Normocephalic and atraumatic  Eyes: No scleral icterus  Neck: No JVD present     Cardiovascular: Regular rhythm  Exam reveals no gallop and no friction rub  No murmur heard  Pulmonary/Chest: Effort normal  No stridor  No respiratory distress  He has no wheezes  Abdominal: Soft  He exhibits no distension  There is no tenderness  There is no guarding  Neurological: He is alert and oriented to person, place, and time  Skin: Skin is warm and dry  He is not diaphoretic  Psychiatric: He has a normal mood and affect  His behavior is normal    Vitals reviewed  Discussion with Family:  Spoke with Tarun Antonio and gave update    Discharge instructions/Information to patient and family:   See after visit summary for information provided to patient and family  Provisions for Follow-Up Care:  See after visit summary for information related to follow-up care and any pertinent home health orders  Disposition:     Home      Planned Readmission: no     Discharge Statement:  I spent >30 minutes discharging the patient  This time was spent on the day of discharge  I had direct contact with the patient on the day of discharge  Greater than 50% of the total time was spent examining patient, answering all patient questions, arranging and discussing plan of care with patient as well as directly providing post-discharge instructions  Additional time then spent on discharge activities  Discharge Medications:  See after visit summary for reconciled discharge medications provided to patient and family        ** Please Note: This note has been constructed using a voice recognition system **

## 2020-07-08 NOTE — PROGRESS NOTES
Progress Note - Infectious Disease   Susan Hines 66 y o  male MRN: [id number]  Unit/Bed#: E5 -01 Encounter: [id number]    Impression/Plan:  1  Abnormal lumbar spine CT   Patient's CT of the lumbar spine showed endplate irregularity from L1-L2  Unclear if this was same endplate edema of W3-Q7 MKQMB his laminectomy that was seen on MRI in 1/2020   Patient completed a follow up MRI  Results showed evidence of progression and possible diskitis/osteomyelitis  There was a small fluid collection over the anterior L1-L2 region   Patient was seen in IR for culture, fungal culture, and AFB  No patholody was sent  Culture, AFB, and fungal culture were all negative  Patient is afebrile without leukocytosis  He is neurologically intact  Neurosurgery and IR have continued to follow  Ultimately patient would benefit from definitive diagnosis of osteomyelitis before starting long term antibiotics and pathology to rule out malignancy  Patient will proceed with a Ceretec scan before decision is made on repeat IR biopsy vs open biopsy with neurosurgery  -monitor patient off antibiotics   -monitor CBC and BMP  -follow up ceretec scan  -anticipate eventual repeat IR biopsy vs open biopsy with neurosurgery  -monitor back pain     2  Dysuria   Patient reports his symptoms occurred after receiving BCG injection  He describes this is a recurring problem with each BCG/cystoscopy procedure that he goes through   BCG injections are known to cause bladder pain and spasms   He has had recurring urine cultures which are negative but has repeatedly received antibiotic treatment despite these findings  I recommend the patient have conversations with his urology team at [facility] regarding his recurring symptoms and alternative ways for management as repeated antibiotic treatment puts patient at risk for resistant organisms and renal toxicities   Patient is afebrile without leukocytosis  New urine and blood cultures were negative   No indication for ongoing antibiotic treatment at this time  Patient has been seen by urology and has started pyridium  -monitor patient off antibiotics  -monitor CBC and BMP  -monitor  symptoms  -monitor urine output  -ongoing follow-up as an outpatient with Shakira Cheney  -continue follow-up with urology     3  CKD stage 4  Upon review of patient's available past medical records it appears his baseline creatinine is generally 2 2-2 5   Patient's creatinine was 2 62 upon admission  He is stable with last creatinine = 2 34   -monitor BMP  -avoid nephrotoxins     4  Bladder cancer  [facility] is currently in care at [address] initially diagnosed in 1994 to [facility]  He underwent several TURBT and a few rounds of BCG  He followed up with annual endoscopic surveillance which had been negative until new findings were noted in July 2016  He then underwent additioanl TURBT and has had ongoing BCG+INF treatments since   -continue outpatient follow-up with [facility] after discharge     5  Diarrhea  Patient with multiple watery stools after admission  C-diff PCR was sent and was negative  Consider the diarrhea may be side effect of recent broad spectrum antibiotics  Fortunately his diarrhea has improved  He has no other GI complaints at this time and is tolerating oral intake without difficulty   -serial abdominal exams  -monitor GI symptoms  -monitor stool output    Above plan was discussed in detail with patient at the bedside  Antibiotics:  No current antibiotic use    Subjective:  Patient reports he is fine today  He has no new symptoms, concerns, or complaints to report  No worsening of his intermittent back pain  Pain is not radiating into the buttocks or legs today  He feels his ambulation is stable  He has no fever, chills, sweats, shakes; no nausea, vomiting, abdominal pain, diarrhea, or dysuria; no cough, shortness of breath, or chest pain       Objective:  Vitals:  Temp:  [96 5 °F (35 8 °C)-97 6 °F (36 4 °C)] 96 5 °F (35 8 °C)  HR:  [56-66] 59  Resp:  [18] 18  BP: (141-162)/(83-91) 152/91  SpO2:  [94 %-99 %] 99 %  Temp (24hrs), Av °F (36 1 °C), Min:96 5 °F (35 8 °C), Max:97 6 °F (36 4 °C)  Current: Temperature: (!) 96 5 °F (35 8 °C)    Physical Exam:   General Appearance:  Alert, interactive, nontoxic, no acute distress  Patient appears comfortable supine in bed  Throat: Oropharynx moist without lesions  Lungs:   Clear to auscultation bilaterally; no wheezes, rhonchi or rales; respirations unlabored; patient is on room air   Heart:  Bradycardia; no murmur, rub or gallop   Abdomen:   Soft, non-tender, non-distended, positive bowel sounds  Extremities: No clubbing or cyanosis, no edema   Skin: No new rashes, lesions, or draining wounds noted on exposed skin  Labs, Imaging, & Other studies:   All pertinent labs and imaging studies were personally reviewed  Results from last 7 days   Lab Units 20  0456 20  0434 20  0507   WBC Thousand/uL 6 99 7 78 8 59   HEMOGLOBIN g/dL 10 8* 10 4* 11 1*   PLATELETS Thousands/uL 178 173 191     Results from last 7 days   Lab Units 20  0520  20  0434   POTASSIUM mmol/L 4 4   < > 4 3   CHLORIDE mmol/L 106   < > 105   CO2 mmol/L 25   < > 21   BUN mg/dL 40*   < > 44*   CREATININE mg/dL 2 34*   < > 2 33*   EGFR ml/min/1 73sq m 30   < > 30   CALCIUM mg/dL 8 7   < > 8 6   AST U/L  --   --  38   ALT U/L  --   --  67   ALK PHOS U/L  --   --  84    < > = values in this interval not displayed       Results from last 7 days   Lab Units 20  1004   GRAM STAIN RESULT  Rare Polys  No bacteria seen   BODY FLUID CULTURE, STERILE  No growth         Results from last 7 days   Lab Units 20  0507   CRP mg/L 9 9*

## 2020-07-08 NOTE — ASSESSMENT & PLAN NOTE
· Observe off antibiotics since this is due to BCG instillation itself and not due to acute infection  · Urology recommendations reviewed  · They recommended Pyridium on discharge however this is contraindicated due to his chronic kidney disease stage 4

## 2020-07-08 NOTE — PROGRESS NOTES
Progress Note - Urology  Jermaine Smalls 1945, 66 y o  male MRN: [id number]    Unit/Bed#: E5 -01 Encounter: [id number]    Cystitis due to intravesical BCG administration  Assessment & Plan  Irritative symptoms of dysuria and suprapubic discomfort with voiding after BCG administration 2 weeks ago  Urine cultures are repeatedly no growth  Receives maintenance BCG at [facility] q 3 months for a recurrence of his bladder cancer back in 2016  Initial diagnosis 1994 with induction BCG back then  Will add pyridium prn for use for few days- having good relief with this  PVRs are low, no evidence of retention  Subjective:   HPI Sonia Cavanaugh feels a lot better today with the addition of Pyridium  His dysuria and bladder discomfort when from an 8 down to a 2 on pain scale  Urine is clear  Patient tells me he is being discharged today  He has follow-up already scheduled at [facility] with his urologist and VENESSA Borjas for his spinal specialist     Review of Systems   Constitutional: Negative  Negative for chills and fever  Respiratory: Negative  Cardiovascular: Negative  Gastrointestinal: Negative for abdominal pain  Genitourinary: Positive for dysuria and urgency  Negative for decreased urine volume, difficulty urinating, flank pain, frequency and hematuria  Musculoskeletal: Positive for back pain  Objective:  Vitals: Blood pressure 152/91, pulse 59, temperature (!) 96 5 °F (35 8 °C), temperature source Temporal, resp  rate 18, height 6' 4" (1 93 m), weight 108 kg (238 lb 12 1 oz), SpO2 99 %  ,Body mass index is 29 06 kg/m²      Intake/Output Summary (Last 24 hours) at 7/8/2020 1111  Last data filed at 7/8/2020 0740  Gross per 24 hour   Intake 160 ml   Output --   Net 160 ml     Invasive Devices     Peripheral Intravenous Line            Peripheral IV 07/04/20 Left;Proximal;Ventral (anterior) Forearm 4 days                Physical Exam   Constitutional: He is oriented to person, place, and time  He appears well-developed and well-nourished  No distress  HENT:   Head: Normocephalic and atraumatic  Pulmonary/Chest: Effort normal    Abdominal: Soft  Bowel sounds are normal  There is no tenderness  Musculoskeletal: He exhibits no edema  Neurological: He is alert and oriented to person, place, and time  Gait normal    Skin: Skin is warm and dry  He is not diaphoretic  Psychiatric: He has a normal mood and affect  His speech is normal and behavior is normal    Nursing note and vitals reviewed  History:    Past Medical History:   Diagnosis Date    Anemia     Last assessed: 9/28/17    Arteriosclerotic cardiovascular disease     Last assessed: 9/28/17    [facility] [address] [facility]     Cancer ([facility]     bladder    Cardiac disease     Diabetes mellitus ([facility] )     Hypertension     Hypotension     Last assessed: 2/24/15    Insomnia     Last assessed: 11/14/12    Other seasonal allergic rhinitis     Last assessed: 2/10/16    Transient cerebral ischemia      Past Surgical History:   Procedure Laterality Date    CARDIAC SURGERY      Cath stent placement  Last assessed: 3/9/17  Interventional Catheterization    CHOLECYSTECTOMY      CYSTOSCOPY      Diagnostic w/biopsy  Colby Baer  Last assessed: 12/1/14    CYSTOURETHROSCOPY      w/cautery  Colby Baer    GASTRIC BYPASS      For morbid obesity w/Shaji-en-Y  Resolved: 11/17/09    INCISION AND DRAINAGE OF WOUND Right 2/26/2017    Procedure: INCISION AND DRAINAGE (I&D) EXTREMITY WITH APPLICATION OF GRAFT JACKET;  Surgeon: Wilmar Oden DPM;  Location: [facility];  Service:     INCISION AND DRAINAGE OF WOUND Right 4/25/2017    Procedure: INCISION AND DRAINAGE (I&D) EXTREMITY, APPLICATION OF GRAFT;  Surgeon: Wilmar Oden DPM;  [facility];  Service:     IR IMAGE GUIDED BIOPSY/ASPIRATION  7/2/2020    JOINT REPLACEMENT      Knee   Last assessed: 1/28/11    GA CYSTOURETHROSCOPY,BIOPSY N/A 8/16/2016    Procedure: CYSTOSCOPY WITH BIOPSIES;  Surgeon: Rosalba Jaime MD;  Location:  MAIN OR;  Service: Urology    ROTATOR CUFF REPAIR      SMALL INTESTINE SURGERY      Surgery Shaji-en-Y    SPINAL FUSION      VAC DRESSING APPLICATION Right 1/62/1006    Procedure: APPLICATION VAC DRESSING;  Surgeon: Kimmy Jaimes DPM;  Location: [facility];  Service:      Family History   Problem Relation Age of Onset    Diabetes Mother     Heart disease Mother     Other Mother         High blood pressure    Heart disease Father     Diabetes Sister     Other Sister         High blood pressure    Kidney disease Sister     Heart disease Brother     Other Brother         High blood pressure     Social History     Socioeconomic History    Marital status: /Civil Union     Spouse name: None    Number of children: None    Years of education: None    Highest education level: None   Occupational History    None   Social Needs    Financial resource strain: None    Food insecurity:     Worry: None     Inability: None    Transportation needs:     Medical: None     Non-medical: None   Tobacco Use    Smoking status: Former Smoker    Smokeless tobacco: Never Used   Substance and Sexual Activity    Alcohol use: Never     Frequency: 2-3 times a week     Drinks per session: 10 or more    Drug use: Never    Sexual activity: None   Lifestyle    Physical activity:     Days per week: None     Minutes per session: None    Stress: None   Relationships    Social connections:     Talks on phone: None     Gets together: None     Attends Restoration service: None     Active member of club or organization: None     Attends meetings of clubs or organizations: None     Relationship status: None    Intimate partner violence:     Fear of current or ex partner: None     Emotionally abused: None     Physically abused: None     Forced sexual activity: None   Other Topics Concern    None   Social History Narrative    None       Labs:  Recent Labs     07/06/20  0456   WBC 6 99     Recent Labs     07/06/20  0456   HGB 10 8*       Recent Labs     07/06/20  0456 07/08/20  0520   CREATININE 2 30* 2 34*         Corry Olson PA-C  Date: 7/8/2020 Time: 11:11 AM

## 2020-07-08 NOTE — NURSING NOTE
Pt educated on medications instructions  He was escorted out of the facility with a PCA and family member  All questions and concerns addressed

## 2020-07-08 NOTE — ASSESSMENT & PLAN NOTE
Lab Results   Component Value Date    HGBA1C 7 5 (H) 06/24/2020       Recent Labs     07/07/20  1544 07/07/20  2054 07/08/20  0716 07/08/20  1127   POCGLU 133 124 83 118       Blood Sugar Average: Last 72 hrs:  (P) 255 [id number]     A1c is acceptable  Blood sugars stable on Lantus 30 units at bedtime and Humalog 10 units with meals  Resume home regimen on discharge

## 2020-07-08 NOTE — PHYSICAL THERAPY NOTE
Physical Therapy Progress Note     07/08/20 1013   Pain Assessment   Pain Assessment Tool Pain Assessment not indicated - pt denies pain   Pain Score No Pain   Restrictions/Precautions   Weight Bearing Precautions Per Order No   Other Precautions Fall Risk;Hard of hearing   General   Chart Reviewed Yes   Response to Previous Treatment Patient with no complaints from previous session  Family/Caregiver Present No   Subjective   Subjective Pt  willing to participate in therapy this AM   Bed Mobility   Supine to Sit 6  Modified independent   Additional items Bedrails   Sit to Supine 6  Modified independent   Additional items Bedrails   Transfers   Sit to Stand 6  Modified independent   Additional items Bedrails   Stand to Sit 6  Modified independent   Additional items Bedrails   Ambulation/Elevation   Gait pattern Forward Flexion; Wide ROSALIA; Short stride; Excessively slow   Gait Assistance 5  Supervision   Additional items Assist x 1;Verbal cues   Assistive Device None   Distance 200'x1 with one standing rest break   Stair Management Assistance 5  Supervision   Additional items Assist x 1   Stair Management Technique Two rails; Alternating pattern; Foreward;Reciprocal   Number of Stairs 5   Balance   Static Sitting Good   Dynamic Sitting Fair +   Static Standing Fair   Dynamic Standing [id number]   Endurance Deficit   Endurance Deficit No   Activity Tolerance   Activity Tolerance Patient tolerated treatment well   Nurse Made Aware Yes   Assessment   Prognosis Good   Problem List Decreased strength;Decreased endurance;Decreased mobility; Impaired hearing; Impaired sensation   Assessment Pt  rec'd supine in bed, pt  willing to participate in therapy this AM  Pt  completed all transfers Mod I using bedrails, and all gait and stair negotiation with Supervision x 1, no AD used this session  Pt  ambulated 200' and negotiated 5 steps   Pt  returned to supine in bed, and remained supine in bed following his treatment session  Issued SPC to pt  for home as needed for support/balance  Pt  will benefit from OPPT and an increased level of support once medically stable and d/c   Barriers to Discharge None   Goals   Patient Goals To go home   STG Expiration Date 07/10/20   PT Treatment Day 2   Plan   Treatment/Interventions Functional transfer training; Endurance training;Patient/family training;Bed mobility;Gait training;Spoke to nursing;Spoke to case management;LE strengthening/ROM; Elevations   Progress Progressing toward goals   PT Frequency 2-3x/wk   Recommendation   PT Discharge Recommendation Return to previous environment with social support;Home with skilled therapy  (OPPT)   Equipment Recommended Cane  (Issued SPC)   PT - OK to Discharge Yes   Additional Comments To home with OPPT when medically stable         KATT Aaron

## 2020-07-08 NOTE — ASSESSMENT & PLAN NOTE
Degenerative but cannot rule out infection  Patient has no neurologic deficit, no worsening pain or fever  CRP was only mildly elevated  Status post needle aspiration of L1-L2 with no growth or sign of infection  Discussed with ID and with patient who prefers to follow-up with his neurosurgeon at [facility] for further evaluation and treatment  Spoke with the office of Dr Jalen Cuellar regarding his condition  I spoke with Cynthia Escamilla, nurse coordinator and confirmed that he has an virtual appointment on July 15, 2020  He will bring a CD of his CT and MRI images on his follow-up    At this time he will not be treated with antibiotics

## 2020-07-08 NOTE — ASSESSMENT & PLAN NOTE
Ongoing treatments through [facility] had a BCG treatment last Tuesday 6/23/19      Follow-up with Lisset Ramirez for further treatment

## 2020-07-08 NOTE — PLAN OF CARE
Problem: PHYSICAL THERAPY ADULT  Goal: Performs mobility at highest level of function for planned discharge setting  See evaluation for individualized goals  Description  Treatment/Interventions: Functional transfer training, LE strengthening/ROM, Elevations, Therapeutic exercise, Endurance training, Patient/family training, Equipment eval/education, Bed mobility, Gait training, Compensatory technique education, OT, Continued evaluation, Family          See flowsheet documentation for full assessment, interventions and recommendations  Outcome: Progressing  Note:   Prognosis: Good  Problem List: Decreased strength, Decreased endurance, Decreased mobility, Impaired hearing, Impaired sensation  Assessment: Pt  rec'd supine in bed, pt  willing to participate in therapy this AM  Pt  completed all transfers Mod I using bedrails, and all gait and stair negotiation with Supervision x 1, no AD used this session  Pt  ambulated 200' and negotiated 5 steps  Pt  returned to supine in bed, and remained supine in bed following his treatment session  Issued SPC to pt  for home as needed for support/balance  Pt  will benefit from OPPT and an increased level of support once medically stable and d/c  Barriers to Discharge: None  Barriers to Discharge Comments: steps  PT Discharge Recommendation: Return to previous environment with social support, Home with skilled therapy(OPPT)     PT - OK to Discharge: Yes    See flowsheet documentation for full assessment

## 2020-07-09 ENCOUNTER — APPOINTMENT (OUTPATIENT)
Dept: LAB | Facility: CLINIC | Age: 75
End: 2020-07-09
Payer: MEDICARE

## 2020-07-09 ENCOUNTER — TRANSITIONAL CARE MANAGEMENT (OUTPATIENT)
Dept: INTERNAL MEDICINE CLINIC | Facility: CLINIC | Age: 75
End: 2020-07-09

## 2020-07-09 DIAGNOSIS — M46.26 OSTEOMYELITIS OF LUMBAR SPINE (HCC): ICD-10-CM

## 2020-07-09 LAB
BASOPHILS # BLD AUTO: 0.06 THOUSANDS/ΜL (ref 0–0.1)
BASOPHILS NFR BLD AUTO: 1 % (ref 0–1)
CRP SERPL QL: <3 MG/L
EOSINOPHIL # BLD AUTO: 0.24 THOUSAND/ΜL (ref 0–0.61)
EOSINOPHIL NFR BLD AUTO: 3 % (ref 0–6)
ERYTHROCYTE [DISTWIDTH] IN BLOOD BY AUTOMATED COUNT: 13.6 % (ref 11.6–15.1)
ERYTHROCYTE [SEDIMENTATION RATE] IN BLOOD: 28 MM/HOUR (ref 0–10)
HCT VFR BLD AUTO: 36.4 % (ref 36.5–49.3)
HGB BLD-MCNC: 11.6 G/DL (ref 12–17)
IMM GRANULOCYTES # BLD AUTO: 0.03 THOUSAND/UL (ref 0–0.2)
IMM GRANULOCYTES NFR BLD AUTO: 0 % (ref 0–2)
LYMPHOCYTES # BLD AUTO: 2.34 THOUSANDS/ΜL (ref 0.6–4.47)
LYMPHOCYTES NFR BLD AUTO: 28 % (ref 14–44)
MCH RBC QN AUTO: 30.7 PG (ref 26.8–34.3)
MCHC RBC AUTO-ENTMCNC: 31.9 G/DL (ref 31.4–37.4)
MCV RBC AUTO: 96 FL (ref 82–98)
MONOCYTES # BLD AUTO: 0.76 THOUSAND/ΜL (ref 0.17–1.22)
MONOCYTES NFR BLD AUTO: 9 % (ref 4–12)
NEUTROPHILS # BLD AUTO: 4.99 THOUSANDS/ΜL (ref 1.85–7.62)
NEUTS SEG NFR BLD AUTO: 59 % (ref 43–75)
NRBC BLD AUTO-RTO: 0 /100 WBCS
PLATELET # BLD AUTO: 204 THOUSANDS/UL (ref 149–390)
PMV BLD AUTO: 11.5 FL (ref 8.9–12.7)
RBC # BLD AUTO: 3.78 MILLION/UL (ref 3.88–5.62)
WBC # BLD AUTO: 8.42 THOUSAND/UL (ref 4.31–10.16)

## 2020-07-09 PROCEDURE — 86140 C-REACTIVE PROTEIN: CPT

## 2020-07-09 PROCEDURE — 85652 RBC SED RATE AUTOMATED: CPT

## 2020-07-09 PROCEDURE — 36415 COLL VENOUS BLD VENIPUNCTURE: CPT

## 2020-07-09 PROCEDURE — 85025 COMPLETE CBC W/AUTO DIFF WBC: CPT

## 2020-07-13 DIAGNOSIS — E11.8 TYPE 2 DIABETES MELLITUS WITH COMPLICATION (HCC): ICD-10-CM

## 2020-07-13 DIAGNOSIS — Z79.4 TYPE 2 DIABETES MELLITUS WITH COMPLICATION, WITH LONG-TERM CURRENT USE OF INSULIN (HCC): ICD-10-CM

## 2020-07-13 DIAGNOSIS — E11.8 TYPE 2 DIABETES MELLITUS WITH COMPLICATION, WITH LONG-TERM CURRENT USE OF INSULIN (HCC): ICD-10-CM

## 2020-07-13 RX ORDER — INSULIN ASPART 100 [IU]/ML
INJECTION, SOLUTION INTRAVENOUS; SUBCUTANEOUS
Qty: 10 PEN | Refills: 1 | Status: SHIPPED | OUTPATIENT
Start: 2020-07-13 | End: 2020-08-13 | Stop reason: SDUPTHER

## 2020-07-13 RX ORDER — INSULIN GLARGINE 100 [IU]/ML
INJECTION, SOLUTION SUBCUTANEOUS
Qty: 10 PEN | Refills: 1 | Status: SHIPPED | OUTPATIENT
Start: 2020-07-13 | End: 2020-10-29 | Stop reason: SDUPTHER

## 2020-07-15 LAB
BACTERIA SPEC BFLD CULT: NO GROWTH
GRAM STN SPEC: NORMAL
GRAM STN SPEC: NORMAL

## 2020-07-16 ENCOUNTER — OFFICE VISIT (OUTPATIENT)
Dept: INTERNAL MEDICINE CLINIC | Facility: CLINIC | Age: 75
End: 2020-07-16
Payer: MEDICARE

## 2020-07-16 VITALS
BODY MASS INDEX: 28.98 KG/M2 | WEIGHT: 238 LBS | SYSTOLIC BLOOD PRESSURE: 130 MMHG | TEMPERATURE: 97.5 F | HEIGHT: 76 IN | RESPIRATION RATE: 13 BRPM | DIASTOLIC BLOOD PRESSURE: 84 MMHG | HEART RATE: 80 BPM

## 2020-07-16 DIAGNOSIS — C67.9 MALIGNANT NEOPLASM OF URINARY BLADDER, UNSPECIFIED SITE (HCC): ICD-10-CM

## 2020-07-16 DIAGNOSIS — I10 ESSENTIAL HYPERTENSION: ICD-10-CM

## 2020-07-16 DIAGNOSIS — E78.5 HYPERLIPIDEMIA, UNSPECIFIED HYPERLIPIDEMIA TYPE: ICD-10-CM

## 2020-07-16 DIAGNOSIS — I20.8 STABLE ANGINA PECTORIS (HCC): ICD-10-CM

## 2020-07-16 DIAGNOSIS — F32.9 REACTIVE DEPRESSION: ICD-10-CM

## 2020-07-16 DIAGNOSIS — E11.42 DIABETIC POLYNEUROPATHY ASSOCIATED WITH TYPE 2 DIABETES MELLITUS (HCC): ICD-10-CM

## 2020-07-16 DIAGNOSIS — Z79.4 TYPE 2 DIABETES MELLITUS WITH DIABETIC NEUROPATHY, WITH LONG-TERM CURRENT USE OF INSULIN (HCC): Primary | ICD-10-CM

## 2020-07-16 DIAGNOSIS — M51.36 DEGENERATION OF LUMBAR INTERVERTEBRAL DISC: ICD-10-CM

## 2020-07-16 DIAGNOSIS — F41.9 ANXIETY: ICD-10-CM

## 2020-07-16 DIAGNOSIS — N18.30 CKD (CHRONIC KIDNEY DISEASE) STAGE 3, GFR 30-59 ML/MIN (HCC): ICD-10-CM

## 2020-07-16 DIAGNOSIS — E11.40 TYPE 2 DIABETES MELLITUS WITH DIABETIC NEUROPATHY, WITH LONG-TERM CURRENT USE OF INSULIN (HCC): Primary | ICD-10-CM

## 2020-07-16 DIAGNOSIS — J42 CHRONIC BRONCHITIS, UNSPECIFIED CHRONIC BRONCHITIS TYPE (HCC): ICD-10-CM

## 2020-07-16 PROCEDURE — 99495 TRANSJ CARE MGMT MOD F2F 14D: CPT | Performed by: INTERNAL MEDICINE

## 2020-07-16 RX ORDER — ALPRAZOLAM 0.25 MG/1
TABLET ORAL
Qty: 30 TABLET | Refills: 0 | Status: SHIPPED | OUTPATIENT
Start: 2020-07-16 | End: 2021-08-30 | Stop reason: SDUPTHER

## 2020-07-16 NOTE — PROGRESS NOTES
Assessment/Plan:    Admitted to the hospital because of cystitis most likely secondary to BCG administration no evidence of UTI or sepsis    Abnormal MRI of the lumbar spine L1-L2 had by [facility] aspiration culture which showed no growth so far inflammatory markers are low the CRP and a sed rate he looks great and feels great denies any fever chills or back pain    Chronic renal failure stable weight is stable he has no edema blood pressure is control also has a history of diabetes  Diabetes hemoglobin A1c is 7 5 I review medications the Lantus he takes 30 units at bedtime the Humalog he takes 10 units before each meal no episodes of hypoglycemia  Coronary artery disease stable no angina blood pressure control no change in plan    History of bladder cancer being followed by Urology closely  COPD stable he has no cough phlegm or shortness of breath at this particular time  Will see him back in follow-up visit meds were review     I renew the Xanax and the Zoloft     Diabetic polyneuropathy at the present time he does not have any pain or symptoms he does have decrease sensation in the lower extremities  TCM Call (since 6/15/2020)     Date and time call was made  7/9/2020 10:50 AM    Patient was hospitialized at  [place] [address]    Date of Admission  06/29/20    Date of discharge  07/08/20    Diagnosis  Abnormal MRI, lumbar spine/Cystitis due to intravesical BCG administration    Disposition  Home    Were the patients medications reviewed and updated  Yes    Current Symptoms  None      TCM Call (since 6/15/2020)     Should patient be enrolled in anticoag monitoring? No    Scheduled for follow up? Yes    I have advised the patient to call PCP with any new or worsening symptoms  Sandeep Mcdonald MA          No problem-specific Assessment & Plan notes found for this encounter         Diagnoses and all orders for this visit:    Type 2 diabetes mellitus with diabetic neuropathy, with long-term current use of insulin ([facility])    Chronic bronchitis, unspecified chronic bronchitis type ([facility] [address] )    Essential hypertension    Stable angina pectoris ([facility] [address] )    Malignant neoplasm of urinary bladder, unspecified site [facility])    Degeneration of lumbar intervertebral disc    CKD (chronic kidney disease) stage 3, GFR 30-59 ml/min ([facility])    Hyperlipidemia, unspecified hyperlipidemia type          Subjective:      Patient ID: Fabrizio Barker is a 66 y o  male  No chief complaint on file  Current Outpatient Medications:     ALPRAZolam (XANAX) 0 25 mg tablet, At twice a day as needed for anxiety, Disp: 30 tablet, Rfl: 0    aspirin 81 MG tablet, Take 81 mg by mouth daily  , Disp: , Rfl:     Azelastine HCl 0 15 % SOLN, Inhale 1 spray 2 (two) times a day, Disp: 30 mL, Rfl: 3    bismuth subsalicylate (PEPTO BISMOL) 262 MG chewable tablet, Chew 524 mg daily as needed for indigestion  , Disp: , Rfl:     Cholecalciferol 1 25 MG (81405 UT) capsule, Take 1 capsule (50,000 Units total) by mouth every 30 (thirty) days, Disp: 12 capsule, Rfl: 0    fluticasone (FLONASE) 50 mcg/act nasal spray, One spray in each nostril twice a day, Disp: 1 Bottle, Rfl: 3    furosemide (LASIX) 20 mg tablet, Take 1 tablet every other day or as needed for edema - instruction change 6/24/20, Disp: 90 tablet, Rfl: 1    gabapentin (NEURONTIN) 300 mg capsule, TAKE 2 CAPSULES BY MOUTH EVERY DAY AT BEDTIME (Patient taking differently: daily TAKE 2 CAPSULES BY MOUTH EVERY DAY IN THE MORNING), Disp: 180 capsule, Rfl: 1    Insulin Pen Needle 31G X 8 MM MISC, Inject 3 times a day, Disp: 100 each, Rfl: 2    isosorbide mononitrate (IMDUR) 30 mg 24 hr tablet, TAKE 3 TABLETS BY MOUTH EVERY DAY, Disp: 270 tablet, Rfl: 0    LANTUS SOLOSTAR 100 units/mL injection pen, 30 units qhs, Disp: 10 pen, Rfl: 1    loperamide (IMODIUM) 2 mg capsule, Take by mouth, Disp: , Rfl:     LUMIGAN 0 01 % ophthalmic drops, , Disp: , Rfl:     metoprolol succinate (TOPROL-XL) 100 mg 24 hr tablet, Take 100 mg by mouth daily  , Disp: , Rfl:     multivitamin (THERAGRAN) TABS, Take 1 tablet by mouth daily  , Disp: , Rfl:     NOVOLOG FLEXPEN 100 units/mL SOPN, 10 units with each meal, Disp: 10 pen, Rfl: 1    omeprazole (PriLOSEC) 20 mg delayed release capsule, Take 1 capsule (20 mg total) by mouth daily, Disp: 90 capsule, Rfl: 1    sertraline (ZOLOFT) 50 mg tablet, Take 1 tablet (50 mg total) by mouth daily, Disp: 30 tablet, Rfl: 3    HPI    The following portions of the patient's history were reviewed and updated as appropriate: allergies, current medications, past family history, past medical history, past social history, past surgical history and problem list     Review of Systems   Constitutional: Negative  Negative for activity change, appetite change, fatigue, fever and unexpected weight change  HENT: Negative for congestion, ear pain, hearing loss, mouth sores, postnasal drip, rhinorrhea, sore throat, trouble swallowing and voice change  Eyes: Negative for pain, redness and visual disturbance  Respiratory: Negative for cough, chest tightness, shortness of breath and wheezing  Cardiovascular: Negative for chest pain, palpitations and leg swelling  Gastrointestinal: Negative for abdominal distention, abdominal pain, blood in stool, constipation, diarrhea and nausea  Endocrine: Negative for cold intolerance, heat intolerance, polydipsia, polyphagia and polyuria  Genitourinary: Negative for difficulty urinating, dysuria, flank pain, frequency, hematuria and urgency  Musculoskeletal: Negative for arthralgias, back pain, gait problem, joint swelling and myalgias  Skin: Negative for color change and pallor  Neurological: Negative for dizziness, tremors, seizures, syncope, weakness, numbness and headaches  Hematological: Negative for adenopathy  Does not bruise/bleed easily  Psychiatric/Behavioral: Negative  Negative for sleep disturbance   The patient is not nervous/anxious  Objective: There were no vitals taken for this visit  Physical Exam   Constitutional: He is oriented to person, place, and time  He appears well-developed and well-nourished  HENT:   Head: Normocephalic  Right Ear: External ear normal    Left Ear: External ear normal    Nose: Nose normal    Mouth/Throat: Oropharynx is clear and moist  No oropharyngeal exudate  Eyes: Pupils are equal, round, and reactive to light  Conjunctivae and EOM are normal    Neck: Normal range of motion  Neck supple  No thyromegaly present  Cardiovascular: Normal rate, regular rhythm, normal heart sounds and intact distal pulses  Exam reveals no gallop and no friction rub  No murmur heard  S1-S2 regular rhythm  Blood pressure 115/80   Extremities no edema   Pulmonary/Chest: Effort normal and breath sounds normal  No respiratory distress  He has no wheezes  He has no rales  Lungs are clear no wheezing rales or rhonchi   Abdominal: Soft  Bowel sounds are normal  He exhibits no distension and no mass  There is no tenderness  There is no rebound and no guarding  Genitourinary: Guaiac stool:      Musculoskeletal: Normal range of motion  He exhibits no edema, tenderness or deformity  No spine tenderness on percussion   Lymphadenopathy:     He has no cervical adenopathy  Neurological: He is alert and oriented to person, place, and time  Skin: Skin is warm and dry  Psychiatric: He has a normal mood and affect  His behavior is normal  Judgment normal    Nursing note and vitals reviewed

## 2020-07-29 NOTE — TELEPHONE ENCOUNTER
As a follow-up, a second attempt has been made for outreach via fax, please see Contacts section for details  A third and final attempt will be made within 5 business days      [phone]    Thank you  Niurka Matthew MA

## 2020-08-03 LAB — FUNGUS SPEC CULT: NORMAL

## 2020-08-12 ENCOUNTER — TELEPHONE (OUTPATIENT)
Dept: UROLOGY | Facility: MEDICAL CENTER | Age: 75
End: 2020-08-12

## 2020-08-12 NOTE — TELEPHONE ENCOUNTER
Pt calling to schedule with Dr Ludwig,states he's having painful urination had recent BCG Arden Wang looking thru chart under Notes tab there is 07/08/20 documentation Hoyle Kawasaki PA-C,please review as he's asking to be seen by Kylah Finch

## 2020-08-12 NOTE — TELEPHONE ENCOUNTER
Call placed to patient and offered a NPT with Dr Katerin Dumont for 8/17/2020 at 11:30am  Pt accepted appointment

## 2020-08-13 DIAGNOSIS — E11.8 TYPE 2 DIABETES MELLITUS WITH COMPLICATION (HCC): ICD-10-CM

## 2020-08-13 RX ORDER — INSULIN ASPART 100 [IU]/ML
INJECTION, SOLUTION INTRAVENOUS; SUBCUTANEOUS
Qty: 10 PEN | Refills: 1 | Status: SHIPPED | OUTPATIENT
Start: 2020-08-13 | End: 2021-02-05 | Stop reason: SDUPTHER

## 2020-08-17 ENCOUNTER — OFFICE VISIT (OUTPATIENT)
Dept: UROLOGY | Facility: MEDICAL CENTER | Age: 75
End: 2020-08-17
Payer: MEDICARE

## 2020-08-17 ENCOUNTER — LAB (OUTPATIENT)
Dept: LAB | Facility: CLINIC | Age: 75
End: 2020-08-17
Payer: MEDICARE

## 2020-08-17 VITALS
WEIGHT: 238 LBS | TEMPERATURE: 97.3 F | HEIGHT: 76 IN | BODY MASS INDEX: 28.98 KG/M2 | DIASTOLIC BLOOD PRESSURE: 64 MMHG | SYSTOLIC BLOOD PRESSURE: 120 MMHG

## 2020-08-17 DIAGNOSIS — N18.30 CKD (CHRONIC KIDNEY DISEASE) STAGE 3, GFR 30-59 ML/MIN (HCC): ICD-10-CM

## 2020-08-17 DIAGNOSIS — I10 ESSENTIAL HYPERTENSION: ICD-10-CM

## 2020-08-17 DIAGNOSIS — N40.1 BENIGN PROSTATIC HYPERPLASIA WITH WEAK URINARY STREAM: ICD-10-CM

## 2020-08-17 DIAGNOSIS — R30.0 DYSURIA: ICD-10-CM

## 2020-08-17 DIAGNOSIS — I20.8 STABLE ANGINA PECTORIS (HCC): ICD-10-CM

## 2020-08-17 DIAGNOSIS — M51.36 DEGENERATION OF LUMBAR INTERVERTEBRAL DISC: ICD-10-CM

## 2020-08-17 DIAGNOSIS — R39.12 BENIGN PROSTATIC HYPERPLASIA WITH WEAK URINARY STREAM: ICD-10-CM

## 2020-08-17 DIAGNOSIS — E11.40 TYPE 2 DIABETES MELLITUS WITH DIABETIC NEUROPATHY, WITH LONG-TERM CURRENT USE OF INSULIN (HCC): ICD-10-CM

## 2020-08-17 DIAGNOSIS — Z79.4 TYPE 2 DIABETES MELLITUS WITH DIABETIC NEUROPATHY, WITH LONG-TERM CURRENT USE OF INSULIN (HCC): ICD-10-CM

## 2020-08-17 DIAGNOSIS — J42 CHRONIC BRONCHITIS, UNSPECIFIED CHRONIC BRONCHITIS TYPE (HCC): ICD-10-CM

## 2020-08-17 DIAGNOSIS — C67.8 MALIGNANT NEOPLASM OF OVERLAPPING SITES OF BLADDER (HCC): ICD-10-CM

## 2020-08-17 DIAGNOSIS — R30.0 DYSURIA: Primary | ICD-10-CM

## 2020-08-17 DIAGNOSIS — D09.0 CARCINOMA IN SITU OF BLADDER: ICD-10-CM

## 2020-08-17 DIAGNOSIS — C67.9 MALIGNANT NEOPLASM OF URINARY BLADDER, UNSPECIFIED SITE (HCC): ICD-10-CM

## 2020-08-17 LAB
ALBUMIN SERPL BCP-MCNC: 3.7 G/DL (ref 3.5–5)
ALP SERPL-CCNC: 97 U/L (ref 46–116)
ALT SERPL W P-5'-P-CCNC: 47 U/L (ref 12–78)
ANION GAP SERPL CALCULATED.3IONS-SCNC: 4 MMOL/L (ref 4–13)
AST SERPL W P-5'-P-CCNC: 36 U/L (ref 5–45)
BACTERIA UR QL AUTO: ABNORMAL /HPF
BASOPHILS # BLD AUTO: 0.06 THOUSANDS/ΜL (ref 0–0.1)
BASOPHILS NFR BLD AUTO: 1 % (ref 0–1)
BILIRUB SERPL-MCNC: 0.48 MG/DL (ref 0.2–1)
BILIRUB UR QL STRIP: NEGATIVE
BUN SERPL-MCNC: 33 MG/DL (ref 5–25)
CALCIUM SERPL-MCNC: 8.7 MG/DL (ref 8.3–10.1)
CHLORIDE SERPL-SCNC: 107 MMOL/L (ref 100–108)
CLARITY UR: ABNORMAL
CO2 SERPL-SCNC: 27 MMOL/L (ref 21–32)
COLOR UR: YELLOW
CREAT SERPL-MCNC: 2.45 MG/DL (ref 0.6–1.3)
EOSINOPHIL # BLD AUTO: 0.15 THOUSAND/ΜL (ref 0–0.61)
EOSINOPHIL NFR BLD AUTO: 2 % (ref 0–6)
ERYTHROCYTE [DISTWIDTH] IN BLOOD BY AUTOMATED COUNT: 13.2 % (ref 11.6–15.1)
GFR SERPL CREATININE-BSD FRML MDRD: 28 ML/MIN/1.73SQ M
GLUCOSE SERPL-MCNC: 203 MG/DL (ref 65–140)
GLUCOSE UR STRIP-MCNC: NEGATIVE MG/DL
HCT VFR BLD AUTO: 34.8 % (ref 36.5–49.3)
HGB BLD-MCNC: 11.8 G/DL (ref 12–17)
HGB UR QL STRIP.AUTO: ABNORMAL
HYALINE CASTS #/AREA URNS LPF: ABNORMAL /LPF
IMM GRANULOCYTES # BLD AUTO: 0.04 THOUSAND/UL (ref 0–0.2)
IMM GRANULOCYTES NFR BLD AUTO: 0 % (ref 0–2)
KETONES UR STRIP-MCNC: NEGATIVE MG/DL
LEUKOCYTE ESTERASE UR QL STRIP: ABNORMAL
LYMPHOCYTES # BLD AUTO: 2.64 THOUSANDS/ΜL (ref 0.6–4.47)
LYMPHOCYTES NFR BLD AUTO: 28 % (ref 14–44)
MAGNESIUM SERPL-MCNC: 2.5 MG/DL (ref 1.6–2.6)
MCH RBC QN AUTO: 31.8 PG (ref 26.8–34.3)
MCHC RBC AUTO-ENTMCNC: 33.9 G/DL (ref 31.4–37.4)
MCV RBC AUTO: 94 FL (ref 82–98)
MONOCYTES # BLD AUTO: 0.65 THOUSAND/ΜL (ref 0.17–1.22)
MONOCYTES NFR BLD AUTO: 7 % (ref 4–12)
NEUTROPHILS # BLD AUTO: 5.79 THOUSANDS/ΜL (ref 1.85–7.62)
NEUTS SEG NFR BLD AUTO: 62 % (ref 43–75)
NITRITE UR QL STRIP: NEGATIVE
NON-SQ EPI CELLS URNS QL MICRO: ABNORMAL /HPF
NRBC BLD AUTO-RTO: 0 /100 WBCS
PH UR STRIP.AUTO: 6 [PH]
PLATELET # BLD AUTO: 185 THOUSANDS/UL (ref 149–390)
PMV BLD AUTO: 11.4 FL (ref 8.9–12.7)
POTASSIUM SERPL-SCNC: 5.1 MMOL/L (ref 3.5–5.3)
PROT SERPL-MCNC: 7.6 G/DL (ref 6.4–8.2)
PROT UR STRIP-MCNC: ABNORMAL MG/DL
RBC # BLD AUTO: 3.71 MILLION/UL (ref 3.88–5.62)
RBC #/AREA URNS AUTO: ABNORMAL /HPF
SODIUM SERPL-SCNC: 138 MMOL/L (ref 136–145)
SP GR UR STRIP.AUTO: 1.02 (ref 1–1.03)
TSH SERPL DL<=0.05 MIU/L-ACNC: 1.47 UIU/ML (ref 0.36–3.74)
UROBILINOGEN UR QL STRIP.AUTO: 0.2 E.U./DL
WBC # BLD AUTO: 9.33 THOUSAND/UL (ref 4.31–10.16)
WBC #/AREA URNS AUTO: ABNORMAL /HPF

## 2020-08-17 PROCEDURE — 3008F BODY MASS INDEX DOCD: CPT | Performed by: UROLOGY

## 2020-08-17 PROCEDURE — 84443 ASSAY THYROID STIM HORMONE: CPT

## 2020-08-17 PROCEDURE — 99204 OFFICE O/P NEW MOD 45 MIN: CPT | Performed by: UROLOGY

## 2020-08-17 PROCEDURE — 80053 COMPREHEN METABOLIC PANEL: CPT

## 2020-08-17 PROCEDURE — 2022F DILAT RTA XM EVC RTNOPTHY: CPT | Performed by: UROLOGY

## 2020-08-17 PROCEDURE — 85025 COMPLETE CBC W/AUTO DIFF WBC: CPT

## 2020-08-17 PROCEDURE — 3074F SYST BP LT 130 MM HG: CPT | Performed by: UROLOGY

## 2020-08-17 PROCEDURE — 1036F TOBACCO NON-USER: CPT | Performed by: UROLOGY

## 2020-08-17 PROCEDURE — 87086 URINE CULTURE/COLONY COUNT: CPT

## 2020-08-17 PROCEDURE — 3060F POS MICROALBUMINURIA REV: CPT | Performed by: UROLOGY

## 2020-08-17 PROCEDURE — 1111F DSCHRG MED/CURRENT MED MERGE: CPT | Performed by: UROLOGY

## 2020-08-17 PROCEDURE — 87206 SMEAR FLUORESCENT/ACID STAI: CPT

## 2020-08-17 PROCEDURE — 36415 COLL VENOUS BLD VENIPUNCTURE: CPT

## 2020-08-17 PROCEDURE — 87118 MYCOBACTERIC IDENTIFICATION: CPT

## 2020-08-17 PROCEDURE — 87116 MYCOBACTERIA CULTURE: CPT

## 2020-08-17 PROCEDURE — 88112 CYTOPATH CELL ENHANCE TECH: CPT | Performed by: PATHOLOGY

## 2020-08-17 PROCEDURE — 81001 URINALYSIS AUTO W/SCOPE: CPT

## 2020-08-17 PROCEDURE — 3078F DIAST BP <80 MM HG: CPT | Performed by: UROLOGY

## 2020-08-17 PROCEDURE — 83735 ASSAY OF MAGNESIUM: CPT

## 2020-08-17 PROCEDURE — 3051F HG A1C>EQUAL 7.0%<8.0%: CPT | Performed by: UROLOGY

## 2020-08-17 PROCEDURE — 1160F RVW MEDS BY RX/DR IN RCRD: CPT | Performed by: UROLOGY

## 2020-08-17 PROCEDURE — 3066F NEPHROPATHY DOC TX: CPT | Performed by: UROLOGY

## 2020-08-17 PROCEDURE — 4040F PNEUMOC VAC/ADMIN/RCVD: CPT | Performed by: UROLOGY

## 2020-08-17 RX ORDER — TAMSULOSIN HYDROCHLORIDE 0.4 MG/1
0.4 CAPSULE ORAL
Qty: 90 CAPSULE | Refills: 3 | Status: SHIPPED | OUTPATIENT
Start: 2020-08-17 | End: 2021-01-06 | Stop reason: HOSPADM

## 2020-08-17 RX ORDER — CIPROFLOXACIN 500 MG/1
500 TABLET, FILM COATED ORAL 2 TIMES DAILY
Qty: 14 TABLET | Refills: 0 | Status: SHIPPED | OUTPATIENT
Start: 2020-08-17 | End: 2020-08-24

## 2020-08-17 RX ORDER — TOLTERODINE 4 MG/1
CAPSULE, EXTENDED RELEASE ORAL
Status: ON HOLD | COMMUNITY
Start: 2020-08-11 | End: 2020-11-19 | Stop reason: ALTCHOICE

## 2020-08-17 NOTE — PROGRESS NOTES
Assessment/Plan:    Dysuria  The patient is very unhappy with his voiding pain and pattern  His symptoms could relate to infection, BCG effect, or BCG failure to control his bladder cancer  I recommended that we proceed with obtaining a urinalysis with microscopic, urine culture, urine cytology, and urine for acid-fast bacillus  Once the urine tests are obtained I will treat him empirically with ciprofloxacin 500 mg twice daily for 1 week  We will start tamsulosin as Prostatic obstruction has been noted on cystoscopy  The patient is already taking an anticholinergic as well as Myrbetriq for his voiding symptoms  He will return in approximately 3 weeks  If his symptoms have not improved would consider cystoscopy with bladder biopsy, possible transurethral  prostate biopsy to exclude cancer recurrence  Diagnoses and all orders for this visit:    Dysuria  -     Urinalysis with microscopic; Future  -     Cytology, urine; Future  -     Urine culture; Future  -     AFB Culture with Stain; Future  -     ciprofloxacin (CIPRO) 500 mg tablet; Take 1 tablet (500 mg total) by mouth 2 (two) times a day for 7 days    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)  -     Urinalysis with microscopic; Future  -     Cytology, urine; Future  -     Urine culture; Future  -     AFB Culture with Stain; Future    Carcinoma in situ of bladder    Benign prostatic hyperplasia with weak urinary stream  -     tamsulosin (FLOMAX) 0 4 mg; Take 1 capsule (0 4 mg total) by mouth daily with dinner    Other orders  -     Mirabegron ER 50 MG TB24; Take 50 mg by mouth daily  -     tolterodine (DETROL LA) 4 mg 24 hr capsule; TK 1 C PO D          Subjective:      Patient ID: Casey Meraz is a 66 y o  male  Chief complaint:  Dysuria, difficulty voiding    HPI:  78-year-old male who is followed at [facility] for carcinoma in situ the bladder  He is receiving maintenance BCG treatments    He reports that he has been having more more symptoms after his maintenance BCG treatments  At the present time he has difficulty voiding with slow urinary stream, frequency, bladder pain and dysuria  He is very unhappy with his voiding pattern  He is getting up frequently at night to urinate  The voiding pain causes difficulty sleeping  He has no fever, flank pain and there is no gross hematuria  His last cystoscopy was felt to be negative for recurrence  The patient is seen today in 2nd opinion  The following portions of the patient's history were reviewed and updated as appropriate: allergies, current medications, past family history, past medical history, past social history, past surgical history and problem list     Review of Systems   Constitutional: Positive for activity change and appetite change  Negative for chills, diaphoresis, fatigue and fever  HENT: Negative  Eyes: Negative  Respiratory: Negative  Cardiovascular: Negative  Gastrointestinal: Negative  Endocrine: Negative  Genitourinary:        See HPI   Musculoskeletal: Positive for back pain  Skin: Negative  Allergic/Immunologic: Negative  Neurological: Negative  Hematological: Negative  Psychiatric/Behavioral: Negative  AUA SYMPTOM SCORE      Most Recent Value   AUA SYMPTOM SCORE   How often have you had a sensation of not emptying your bladder completely after you finished urinating? 2   How often have you had to urinate again less than two hours after you finished urinating? 5   How often have you found you stopped and started again several times when you urinate?  0   How often have you found it difficult to postpone urination? 5   How often have you had a weak urinary stream?  5   How often have you had to push or strain to begin urination? 5   How many times did you most typically get up to urinate from the time you went to bed at night until the time you got up in the morning?   5   Quality of Life: If you were to spend the rest of your life with your urinary condition just the way it is now, how would you feel about that?  6   AUA SYMPTOM SCORE  27      Objective:      /64 (BP Location: Left arm, Patient Position: Sitting, Cuff Size: Adult)   Temp (!) 97 3 °F (36 3 °C)   Ht 6' 4" (1 93 m)   Wt 108 kg (238 lb)   BMI 28 97 kg/m²          Physical Exam  Constitutional:       Appearance: He is well-developed  HENT:      Head: Normocephalic and atraumatic  Eyes:      Conjunctiva/sclera: Conjunctivae normal    Neck:      Musculoskeletal: Neck supple  Cardiovascular:      Rate and Rhythm: Normal rate  Pulmonary:      Effort: Pulmonary effort is normal    Abdominal:      General: Bowel sounds are normal  There is no distension  Palpations: Abdomen is soft  There is no mass  Tenderness: There is no abdominal tenderness  There is no right CVA tenderness, left CVA tenderness, guarding or rebound  Hernia: No hernia is present  Genitourinary:     Penis: Normal  No phimosis or hypospadias  Scrotum/Testes: Normal          Right: Mass not present  Left: Mass not present  Rectum: Normal       Comments: Prostate moderately enlarged, palpably benign  No induration  Skin:     General: Skin is warm and dry  Neurological:      Mental Status: He is alert and oriented to person, place, and time  Psychiatric:         Behavior: Behavior normal          Thought Content:  Thought content normal          Judgment: Judgment normal

## 2020-08-17 NOTE — PATIENT INSTRUCTIONS
Bladder Cancer   WHAT YOU NEED TO KNOW:   What is bladder cancer? Bladder cancer starts in the cells that line your bladder  What increases my risk for bladder cancer? · Smoking cigarettes    · Age older than 60    · Exposure to certain chemicals found in paint, dyes, rubber, plastic, metal, and automobile exhaust    · Exposure to arsenic in drinking water or food    · A family history of bladder cancer    · Chronic bladder irritation or inflammation from urinary catheters or frequent urinary tract infections    · Eating large amounts of high-fat foods or red meats  What are the signs and symptoms of bladder cancer? · Blood in your urine or urine that is pink or orange    · A sudden need to urinate, or urinating more often than usual    · Trouble starting the stream of urine or urinating very little    · Pain or burning when you urinate    · Pain in your abdomen or pelvis     · Unexplained weight loss    · Feeling tired or weak  How is bladder cancer diagnosed? Your healthcare provider will examine you  He may insert a gloved finger into your rectum and feel your bladder  You may need any of the following:  · A urine sample  is checked for blood, an infection, or abnormal cells  · X-ray, ultrasound, CT, or MRI  pictures may show the tumor size and location  The pictures may also show if the cancer has spread to other places in your body  You may be given contrast liquid to help the bladder, kidneys, and ureters show up better in pictures  Tell the healthcare provider if you have ever had an allergic reaction to contrast liquid  Do not enter the MRI room with anything metal  Metal can cause serious injury  Tell the healthcare provider if you have any metal in or on your body  · Cystoscopy  is a procedure used to look inside the bladder  · A biopsy  is a procedure to remove a small piece of tissue from your bladder  The tissue is sent to the lab and tested for cancer    How is bladder cancer treated? · Transurethral resection of bladder tumor (TURBT)  is a procedure used to remove the tumor  Bladder muscle near the tumor may also be removed  This procedure is done by inserting tools through your urethra and into your bladder  · Immunotherapy  is medicine given to help your immune system kill cancer cells  It is injected into your vein or directly into your bladder  · Chemotherapy  is medicine given to kill cancer cells  It may be given to you as a pill or an injection into your vein or muscle  It may also be injected directly into your bladder  · Radiation therapy  uses high-energy x-ray beams to kill cancer cells  · Surgery  may be needed to remove your bladder  Surrounding organs and lymph nodes may also be removed  What can I do to care for myself? · Do not smoke  Nicotine can damage blood vessels and make it hard to manage your bladder cancer  Smoking also increases your risk for new or returning cancer  Ask your healthcare provider for information if you currently smoke and need help to quit  E-cigarettes or smokeless tobacco still contain nicotine  Talk to your healthcare provider before you use these products  · Limit or do not drink alcohol  Alcohol may cause you to become dehydrated  Ask your oncologist if it is safe for you to drink alcohol, and how much is safe to drink  · Eat healthy foods  Healthy foods include fruit, vegetables, whole-grain breads, low-fat dairy products, beans, lean meats, and fish  Your healthcare provider may recommend that you eat less red meat  You need to eat enough calories to help prevent weight loss and increase your energy level  You also need protein to give you strength  If you do not feel hungry, eat small amounts often instead of large meals  · Drink liquids as directed  You may need to drink more liquids than usual to prevent dehydration  Ask how much liquid to drink each day and which liquids are best for you  · Exercise as directed  Exercise may help increase your energy level and appetite  Ask your healthcare provider how much exercise you need and which exercises are best for you  Where can I find more information and support? It may be difficult for you and your family to go through cancer and cancer treatments  Join a support group or talk with others who have gone through treatment  · [address]  [place] [address]  Phone: 3- 224 - [phone]  Web Address: http://[employer]/  [employer]  · [address] [place] [address], [address] [place] [address]  Phone: 3- 933 - [phone]  Web Address: http://[employer]/  gov  Call 911 for any of the following:   · You suddenly feel lightheaded and short of breath  · You cough up blood  When should I seek immediate care? · Your arm or leg feels warm, tender, and painful  It may look swollen and red  · You are unable to urinate  When should I contact my healthcare provider? · You have a fever  · You vomit and cannot keep any liquids or food down  · You have new or worsening pain  · Your pain gets worse or does not go away after you take pain medicine  · You have questions or concerns about your condition or care  CARE AGREEMENT:   You have the right to help plan your care  Learn about your health condition and how it may be treated  Discuss treatment options with your caregivers to decide what care you want to receive  You always have the right to refuse treatment  The above information is an  only  It is not intended as medical advice for individual conditions or treatments  Talk to your doctor, nurse or pharmacist before following any medical regimen to see if it is safe and effective for you  © 2017 [address] Information is for End User's use only and may not be sold, redistributed or otherwise used for commercial purposes   All illustrations and images included in [facility] are the copyrighted property of [employer] D [employer]  or [employer]

## 2020-08-17 NOTE — ASSESSMENT & PLAN NOTE
The patient is very unhappy with his voiding pain and pattern  His symptoms could relate to infection, BCG effect, or BCG failure to control his bladder cancer  I recommended that we proceed with obtaining a urinalysis with microscopic, urine culture, urine cytology, and urine for acid-fast bacillus  Once the urine tests are obtained I will treat him empirically with ciprofloxacin 500 mg twice daily for 1 week  We will start tamsulosin as Prostatic obstruction has been noted on cystoscopy  The patient is already taking an anticholinergic as well as Myrbetriq for his voiding symptoms  He will return in approximately 3 weeks  If his symptoms have not improved would consider cystoscopy with bladder biopsy, possible transurethral  prostate biopsy to exclude cancer recurrence

## 2020-08-18 DIAGNOSIS — G89.29 CHRONIC LOW BACK PAIN WITHOUT SCIATICA, UNSPECIFIED BACK PAIN LATERALITY: ICD-10-CM

## 2020-08-18 DIAGNOSIS — M54.50 CHRONIC LOW BACK PAIN WITHOUT SCIATICA, UNSPECIFIED BACK PAIN LATERALITY: ICD-10-CM

## 2020-08-18 LAB
BACTERIA UR CULT: NORMAL
MYCOBACTERIUM SPEC CULT: NORMAL
RHODAMINE-AURAMINE STN SPEC: NORMAL

## 2020-08-18 RX ORDER — GABAPENTIN 300 MG/1
CAPSULE ORAL
Qty: 180 CAPSULE | Refills: 1 | Status: SHIPPED | OUTPATIENT
Start: 2020-08-18 | End: 2020-09-02 | Stop reason: SDUPTHER

## 2020-08-19 ENCOUNTER — HOSPITAL ENCOUNTER (EMERGENCY)
Facility: HOSPITAL | Age: 75
Discharge: HOME | End: 2020-08-19
Attending: EMERGENCY MEDICINE | Admitting: EMERGENCY MEDICINE
Payer: MEDICARE

## 2020-08-19 ENCOUNTER — TELEPHONE (OUTPATIENT)
Dept: UROLOGY | Facility: MEDICAL CENTER | Age: 75
End: 2020-08-19

## 2020-08-19 VITALS
RESPIRATION RATE: 20 BRPM | DIASTOLIC BLOOD PRESSURE: 75 MMHG | WEIGHT: 238.54 LBS | TEMPERATURE: 97.8 F | OXYGEN SATURATION: 97 % | HEART RATE: 56 BPM | BODY MASS INDEX: 29.04 KG/M2 | SYSTOLIC BLOOD PRESSURE: 114 MMHG

## 2020-08-19 DIAGNOSIS — Z86.39 HISTORY OF DIABETES MELLITUS: ICD-10-CM

## 2020-08-19 DIAGNOSIS — R55 SYNCOPE: Primary | ICD-10-CM

## 2020-08-19 DIAGNOSIS — Z86.79 HISTORY OF CORONARY ARTERY DISEASE: ICD-10-CM

## 2020-08-19 DIAGNOSIS — R63.0 LOSS OF APPETITE: ICD-10-CM

## 2020-08-19 LAB
ANION GAP SERPL CALCULATED.3IONS-SCNC: 10 MMOL/L (ref 4–13)
ATRIAL RATE: 56 BPM
BASOPHILS # BLD AUTO: 0.04 THOUSANDS/ΜL (ref 0–0.1)
BASOPHILS NFR BLD AUTO: 1 % (ref 0–1)
BUN SERPL-MCNC: 32 MG/DL (ref 5–25)
CALCIUM SERPL-MCNC: 7.9 MG/DL (ref 8.3–10.1)
CHLORIDE SERPL-SCNC: 107 MMOL/L (ref 100–108)
CO2 SERPL-SCNC: 25 MMOL/L (ref 21–32)
CREAT SERPL-MCNC: 2.36 MG/DL (ref 0.6–1.3)
EOSINOPHIL # BLD AUTO: 0.12 THOUSAND/ΜL (ref 0–0.61)
EOSINOPHIL NFR BLD AUTO: 2 % (ref 0–6)
ERYTHROCYTE [DISTWIDTH] IN BLOOD BY AUTOMATED COUNT: 13 % (ref 11.6–15.1)
GFR SERPL CREATININE-BSD FRML MDRD: 30 ML/MIN/1.73SQ M
GLUCOSE SERPL-MCNC: 180 MG/DL (ref 65–140)
GLUCOSE SERPL-MCNC: 183 MG/DL (ref 65–140)
HCT VFR BLD AUTO: 34.7 % (ref 36.5–49.3)
HGB BLD-MCNC: 11.4 G/DL (ref 12–17)
IMM GRANULOCYTES # BLD AUTO: 0.04 THOUSAND/UL (ref 0–0.2)
IMM GRANULOCYTES NFR BLD AUTO: 1 % (ref 0–2)
LYMPHOCYTES # BLD AUTO: 2.01 THOUSANDS/ΜL (ref 0.6–4.47)
LYMPHOCYTES NFR BLD AUTO: 28 % (ref 14–44)
MCH RBC QN AUTO: 31.1 PG (ref 26.8–34.3)
MCHC RBC AUTO-ENTMCNC: 32.9 G/DL (ref 31.4–37.4)
MCV RBC AUTO: 95 FL (ref 82–98)
MONOCYTES # BLD AUTO: 0.61 THOUSAND/ΜL (ref 0.17–1.22)
MONOCYTES NFR BLD AUTO: 9 % (ref 4–12)
NEUTROPHILS # BLD AUTO: 4.35 THOUSANDS/ΜL (ref 1.85–7.62)
NEUTS SEG NFR BLD AUTO: 59 % (ref 43–75)
NRBC BLD AUTO-RTO: 0 /100 WBCS
P AXIS: 85 DEGREES
PLATELET # BLD AUTO: 171 THOUSANDS/UL (ref 149–390)
PMV BLD AUTO: 10.5 FL (ref 8.9–12.7)
POTASSIUM SERPL-SCNC: 4.7 MMOL/L (ref 3.5–5.3)
PR INTERVAL: 202 MS
QRS AXIS: -14 DEGREES
QRSD INTERVAL: 102 MS
QT INTERVAL: 430 MS
QTC INTERVAL: 414 MS
RBC # BLD AUTO: 3.66 MILLION/UL (ref 3.88–5.62)
SODIUM SERPL-SCNC: 142 MMOL/L (ref 136–145)
T WAVE AXIS: 28 DEGREES
TROPONIN I SERPL-MCNC: <0.02 NG/ML
VENTRICULAR RATE: 56 BPM
WBC # BLD AUTO: 7.17 THOUSAND/UL (ref 4.31–10.16)

## 2020-08-19 PROCEDURE — 82948 REAGENT STRIP/BLOOD GLUCOSE: CPT

## 2020-08-19 PROCEDURE — 36415 COLL VENOUS BLD VENIPUNCTURE: CPT | Performed by: EMERGENCY MEDICINE

## 2020-08-19 PROCEDURE — 80048 BASIC METABOLIC PNL TOTAL CA: CPT | Performed by: EMERGENCY MEDICINE

## 2020-08-19 PROCEDURE — 85025 COMPLETE CBC W/AUTO DIFF WBC: CPT | Performed by: EMERGENCY MEDICINE

## 2020-08-19 PROCEDURE — 84484 ASSAY OF TROPONIN QUANT: CPT | Performed by: EMERGENCY MEDICINE

## 2020-08-19 PROCEDURE — 93010 ELECTROCARDIOGRAM REPORT: CPT | Performed by: INTERNAL MEDICINE

## 2020-08-19 PROCEDURE — 99285 EMERGENCY DEPT VISIT HI MDM: CPT

## 2020-08-19 PROCEDURE — 93005 ELECTROCARDIOGRAM TRACING: CPT

## 2020-08-19 PROCEDURE — 99285 EMERGENCY DEPT VISIT HI MDM: CPT | Performed by: EMERGENCY MEDICINE

## 2020-08-19 NOTE — ED PROVIDER NOTES
History  Chief Complaint   Patient presents with    Syncope     Pt reports hx syncope  Pt reports was with a friend when he "felt lightheaded" Pt reports hx issues with low blood sugar so he reports he took a glucose tablet and then woke up on the ground  Per EMS patient friend reports patient slid onto ground and denies hitting head  Pt denies head or neck pain, dizziness, sob, cp      66 y o  M w/h/o DM, CKD, HTN, TIA, bladder CA, CAD s/p stent, s/p gastric bypass p/w syncope x today  Pt states he has been having difficulty with low blood sugars  Had saw his endocrinologist for the same issue and had his Novalog decreased  Pt states his sugar was low yesterday at 68  Pt admits he hasn't been eating much recently because he doesn't have an appetite and "only had a little bit but not much" this morning  He states he was talking to a friend and felt "wobbly" like when his blood sugar is low, he took a glucose tablet but passed out  His friend reports pt slid down to the ground but did not hit his head  Pt denies having a HA, neck pain, CP, SOB, abd pain, focal deficits  Pt states he feels fine now  History provided by:  Patient   used: No    Syncope   Episode history:  Single  Most recent episode: Today  Progression:  Resolved  Chronicity:  New  Witnessed: yes    Relieved by:  None tried  Worsened by:  Nothing  Ineffective treatments:  None tried  Associated symptoms: no chest pain, no diaphoresis, no dizziness, no fever, no focal sensory loss, no focal weakness, no headaches, no nausea, no palpitations, no shortness of breath, no vomiting and no weakness    Risk factors: coronary artery disease        Prior to Admission Medications   Prescriptions Last Dose Informant Patient Reported? Taking?    ALPRAZolam (XANAX) 0 25 mg tablet   No No   Sig: At twice a day as needed for anxiety   Azelastine HCl 0 15 % SOLN  Self No No   Sig: Inhale 1 spray 2 (two) times a day   Cholecalciferol 1 25 MG (64888 UT) capsule  Self No No   Sig: Take 1 capsule (50,000 Units total) by mouth every 30 (thirty) days   Insulin Pen Needle 31G X 8 MM MISC  Self No No   Sig: Inject 3 times a day   LANTUS SOLOSTAR 100 units/mL injection pen   No No   Si units qhs   LUMIGAN 0 01 % ophthalmic drops  Self Yes No   Mirabegron ER 50 MG TB24   Yes No   Sig: Take 50 mg by mouth daily   NovoLOG FlexPen 100 units/mL SOPN   No No   Sig: 10 units with each meal   aspirin 81 MG tablet  Self Yes No   Sig: Take 81 mg by mouth daily  bismuth subsalicylate (PEPTO BISMOL) 262 MG chewable tablet  Self Yes No   Sig: Chew 524 mg daily as needed for indigestion  ciprofloxacin (CIPRO) 500 mg tablet   No No   Sig: Take 1 tablet (500 mg total) by mouth 2 (two) times a day for 7 days   fluticasone (FLONASE) 50 mcg/act nasal spray  Self No No   Sig: One spray in each nostril twice a day   furosemide (LASIX) 20 mg tablet   No No   Sig: Take 1 tablet every other day or as needed for edema - instruction change 20   gabapentin (NEURONTIN) 300 mg capsule   No No   Sig: TAKE 2 CAPSULES BY MOUTH EVERY DAY AT BEDTIME   isosorbide mononitrate (IMDUR) 30 mg 24 hr tablet  Self No No   Sig: TAKE 3 TABLETS BY MOUTH EVERY DAY   loperamide (IMODIUM) 2 mg capsule  Self Yes No   Sig: Take by mouth   metoprolol succinate (TOPROL-XL) 100 mg 24 hr tablet  Self Yes No   Sig: Take 100 mg by mouth daily  multivitamin (THERAGRAN) TABS  Self Yes No   Sig: Take 1 tablet by mouth daily     omeprazole (PriLOSEC) 20 mg delayed release capsule  Self No No   Sig: Take 1 capsule (20 mg total) by mouth daily   sertraline (ZOLOFT) 50 mg tablet   No No   Sig: Take 1 tablet (50 mg total) by mouth daily   tamsulosin (FLOMAX) 0 4 mg   No No   Sig: Take 1 capsule (0 4 mg total) by mouth daily with dinner   tolterodine (DETROL LA) 4 mg 24 hr capsule   Yes No   Sig: TK 1 C PO D      Facility-Administered Medications: None       Past Medical History:   Diagnosis Date    Anemia Last assessed: 9/28/17    Arteriosclerotic cardiovascular disease     Last assessed: 9/28/17    [facility]     Cancer ([facility]     bladder    Cardiac disease     Diabetes mellitus ([facility]     Hypertension     Hypotension     Last assessed: 2/24/15    Insomnia     Last assessed: 11/14/12    Other seasonal allergic rhinitis     Last assessed: 2/10/16    Transient cerebral ischemia        Past Surgical History:   Procedure Laterality Date    CARDIAC SURGERY      Cath stent placement  Last assessed: 3/9/17  Interventional Catheterization    CHOLECYSTECTOMY      CYSTOSCOPY      Diagnostic w/biopsy  Nasrin Starcher  Last assessed: 12/1/14    CYSTOURETHROSCOPY      w/cautery  Nasrin Starcher    GASTRIC BYPASS      For morbid obesity w/Shaji-en-Y  Resolved: 11/17/09    INCISION AND DRAINAGE OF WOUND Right 2/26/2017    Procedure: INCISION AND DRAINAGE (I&D) EXTREMITY WITH APPLICATION OF GRAFT JACKET;  Surgeon: Sol Cr DPM;  [facility]: [facility];  Service:     INCISION AND DRAINAGE OF WOUND Right 4/25/2017    Procedure: INCISION AND DRAINAGE (I&D) EXTREMITY, APPLICATION OF GRAFT;  Surgeon: Sol Cr DPM;  [facility];  Service:     IR IMAGE GUIDED BIOPSY/ASPIRATION  7/2/2020    JOINT REPLACEMENT      Knee   Last assessed: 1/28/11    TN CYSTOURETHROSCOPY,BIOPSY N/A 8/16/2016    Procedure: Yarelis Fan;  Surgeon: Doug Dunbar MD;  [facility];  Service: Urology    ROTATOR CUFF REPAIR      SMALL INTESTINE SURGERY      Surgery Shaji-en-Y    SPINAL FUSION      VAC DRESSING APPLICATION Right 0/48/6666    Procedure: APPLICATION VAC DRESSING;  Surgeon: Sol Cr DPM;  Location: [facility];  Service:        Family History   Problem Relation Age of Onset    Diabetes Mother     Heart disease Mother     Other Mother         High blood pressure    Heart disease Father     Diabetes Sister     Other Sister         High blood pressure    Kidney disease Sister     Heart disease Brother     Other Brother         High blood pressure     I have reviewed and agree with the history as documented  E-Cigarette/Vaping    E-Cigarette Use Never User      E-Cigarette/Vaping Substances     Social History     Tobacco Use    Smoking status: Former Smoker    Smokeless tobacco: Never Used   Substance Use Topics    Alcohol use: Never     Frequency: 2-3 times a week     Drinks per session: 10 or more    Drug use: Never       Review of Systems   Constitutional: Negative for chills, diaphoresis and fever  Respiratory: Negative for cough, chest tightness and shortness of breath  Cardiovascular: Positive for syncope  Negative for chest pain, palpitations and leg swelling  Gastrointestinal: Negative for abdominal pain, nausea and vomiting  Musculoskeletal: Negative for back pain and neck pain  Skin: Negative for pallor  Neurological: Positive for syncope  Negative for dizziness, focal weakness, facial asymmetry, speech difficulty, weakness, light-headedness, numbness and headaches  All other systems reviewed and are negative  Physical Exam  Physical Exam  Vitals signs and nursing note reviewed  Constitutional:       General: He is not in acute distress  Appearance: He is well-developed  He is not ill-appearing, toxic-appearing or diaphoretic  HENT:      Head: Normocephalic and atraumatic  Eyes:      Pupils: Pupils are equal, round, and reactive to light  Neck:      Musculoskeletal: Normal range of motion and neck supple  Vascular: No JVD  Cardiovascular:      Rate and Rhythm: Normal rate and regular rhythm  Pulses: Normal pulses  Heart sounds: Normal heart sounds, S1 normal and S2 normal  No murmur  No friction rub  No gallop  Pulmonary:      Effort: Pulmonary effort is normal  No respiratory distress  Breath sounds: Normal breath sounds  No wheezing or rales  Chest:      Chest wall: No tenderness  Abdominal:      General: Bowel sounds are normal  There is no distension  Palpations: Abdomen is soft  Tenderness: There is no abdominal tenderness  There is no guarding or rebound  Musculoskeletal:      Cervical back: Normal       Thoracic back: Normal       Lumbar back: Normal    Skin:     General: Skin is warm and dry  Coloration: Skin is not pale  Findings: No rash  Neurological:      Mental Status: He is alert and oriented to person, place, and time  Cranial Nerves: No cranial nerve deficit  Sensory: No sensory deficit  Motor: Motor function is intact           Vital Signs  ED Triage Vitals [08/19/20 1256]   Temperature Pulse Respirations Blood Pressure SpO2   97 8 °F (36 6 °C) 57 16 114/75 98 %      Temp Source Heart Rate Source Patient Position - Orthostatic VS BP Location FiO2 (%)   Oral Monitor Lying Right arm --      Pain Score       --           Vitals:    08/19/20 1256   BP: 114/75   Pulse: 57   Patient Position - Orthostatic VS: Lying         Visual Acuity      ED Medications  Medications - No data to display    Diagnostic Studies  Results Reviewed     Procedure Component Value Units Date/Time    Troponin I [[id number]]  (Normal) Collected:  08/19/20 1309    Lab Status:  Final result Specimen:  Blood from Arm, Left Updated:  08/19/20 1335     Troponin I <0 02 ng/mL     Basic metabolic panel [[id number]]  (Abnormal) Collected:  08/19/20 1309    Lab Status:  Final result Specimen:  Blood from Arm, Left Updated:  08/19/20 1326     Sodium 142 mmol/L      Potassium 4 7 mmol/L      Chloride 107 mmol/L      CO2 25 mmol/L      ANION GAP 10 mmol/L      BUN 32 mg/dL      Creatinine 2 36 mg/dL      Glucose 180 mg/dL      Calcium 7 9 mg/dL      eGFR 30 ml/min/1 73sq m     Narrative:       [place] guidelines for Chronic Kidney Disease (CKD):     Stage 1 with normal or high GFR (GFR > 90 mL/min/1 73 square meters)    Stage 2 Mild CKD (GFR = 60-89 mL/min/1 73 square meters)    Stage 3A Moderate CKD (GFR = 45-59 mL/min/1 73 square meters)    Stage 3B Moderate CKD (GFR = 30-44 mL/min/1 73 square meters)    Stage 4 Severe CKD (GFR = 15-29 mL/min/1 73 square meters)    Stage 5 End Stage CKD (GFR <15 mL/min/1 73 square meters)  Note: GFR calculation is accurate only with a steady state creatinine    CBC and differential [[id number]]  (Abnormal) Collected:  08/19/20 1309    Lab Status:  Final result Specimen:  Blood from Arm, Left Updated:  08/19/20 1319     WBC 7 17 Thousand/uL      RBC 3 66 Million/uL      Hemoglobin 11 4 g/dL      Hematocrit 34 7 %      MCV 95 fL      MCH 31 1 pg      MCHC 32 9 g/dL      RDW 13 0 %      MPV 10 5 fL      Platelets 592 Thousands/uL      nRBC 0 /100 WBCs      Neutrophils Relative 59 %      Immat GRANS % 1 %      Lymphocytes Relative 28 %      Monocytes Relative 9 %      Eosinophils Relative 2 %      Basophils Relative 1 %      Neutrophils Absolute 4 35 Thousands/µL      Immature Grans Absolute 0 04 Thousand/uL      Lymphocytes Absolute 2 01 Thousands/µL      Monocytes Absolute 0 61 Thousand/µL      Eosinophils Absolute 0 12 Thousand/µL      Basophils Absolute 0 04 Thousands/µL     Fingerstick Glucose (POCT) [[id number]]  (Abnormal) Collected:  08/19/20 1308    Lab Status:  Final result Updated:  08/19/20 1309     POC Glucose 183 mg/dl                  No orders to display              Procedures  ECG 12 Lead Documentation Only    Date/Time: 8/19/2020 1:19 PM  Performed by: Penny Chauhan DO  Authorized by: Penny Chauhan DO     Indications / Diagnosis:  Chest pain  ECG reviewed by me, the ED Provider: yes    Patient location:  Bedside  Previous ECG:     Previous ECG:  Compared to current    Comparison ECG info:  4/21/17  Rate:     ECG rate:  56    ECG rate assessment: normal    Rhythm:     Rhythm: sinus bradycardia    Ectopy:     Ectopy: none    QRS:     QRS axis:  Left    QRS intervals:  Normal  ST segments:     ST segments: Normal  T waves:     T waves: normal               ED Course  ED Course as of Aug 19 1354   Wed Aug 19, 2020   1320 Baseline   Hemoglobin(!): 11 4   1328 Baseline   Creatinine(!): 2 36   1329 Glucose, Random(!): 180   1344 Updated pt on labs  Pt states he has nausea medicine at home, but will try to make himself eat more  He states his wife and son have been yelling at him for him to eat more  US AUDIT      Most Recent Value   Initial Alcohol Screen: US AUDIT-C    1  How often do you have a drink containing alcohol? 1 Filed at: 08/19/2020 1258   2  How many drinks containing alcohol do you have on a typical day you are drinking? 0 Filed at: 08/19/2020 1258   3a  Male UNDER 65: How often do you have five or more drinks on one occasion? 0 Filed at: 08/19/2020 1258   3b  FEMALE Any Age, or MALE 65+: How often do you have 4 or more drinks on one occassion? 0 Filed at: 08/19/2020 1258   Audit-C Score  1 Filed at: 08/19/2020 1258                  MARIELLE/DAST-10      Most Recent Value   How many times in the past year have you    Used an illegal drug or used a prescription medication for non-medical reasons?   Never Filed at: 08/19/2020 1258                                MDM  Number of Diagnoses or Management Options     Amount and/or Complexity of Data Reviewed  Clinical lab tests: reviewed and ordered  Tests in the medicine section of CPT®: ordered and reviewed          Disposition  Final diagnoses:   Syncope   History of diabetes mellitus   History of coronary artery disease   Loss of appetite     Time reflects when diagnosis was documented in both MDM as applicable and the Disposition within this note     Time User Action Codes Description Comment    8/19/2020  1:29 PM Juan [address]  [facility] Add [R55] Syncope     8/19/2020  1:29 PM Alice Peoples [Z86 [id number]] History of diabetes mellitus     8/19/2020  1:30 PM Alice Tran [Z86 [id number]] History of coronary artery disease     8/19/2020  1:38 PM Juan Nilsa MACK Add [R63 0] Loss of appetite       ED Disposition     ED Disposition Condition Date/Time Comment    Discharge Stable Wed Aug 19, 2020  1:45 PM [place] Sabinal discharge to home/self care  Follow-up Information    None         Patient's Medications   Discharge Prescriptions    No medications on file     No discharge procedures on file      PDMP Review       Value Time User    PDMP Reviewed  Yes 7/16/2020  8:50 AM Adam Patton MD          ED Provider  Electronically Signed by           Trenton Eagle 22, DO  08/19/20 3208

## 2020-08-19 NOTE — TELEPHONE ENCOUNTER
Call placed to patient to make him aware that his urine culture test resulted normal  He was also made aware to keep his usual follow up apt in September  Patient is very concerned because he is still having a lot of urgency and painful urination which he feels increased after starting the antibiotic prescribed  Patient is asking if he can now discontinue this and if his follow up apt should be scheduled as a cystoscopy  Please advise

## 2020-08-19 NOTE — TELEPHONE ENCOUNTER
Called pt and scheduled him for cysto per Dr Kiesha Chavez for urgency and dysuria he is experiencing  Also advised pt to discontinue antibiotic due to negative UC

## 2020-08-20 ENCOUNTER — TELEPHONE (OUTPATIENT)
Dept: UROLOGY | Facility: CLINIC | Age: 75
End: 2020-08-20

## 2020-08-20 ENCOUNTER — TELEPHONE (OUTPATIENT)
Dept: INTERNAL MEDICINE CLINIC | Facility: CLINIC | Age: 75
End: 2020-08-20

## 2020-08-20 NOTE — TELEPHONE ENCOUNTER
As per Dr Jostin Manzo:  Azra Houston was in the ER 8 19 20 for syncope  He asked me to call Azra Houston and ask how his blood sugars are running  Azar Houston said they were low  I asked Azra Houston top call his Endocrinologist, his insulin may need to be adjusted  He said he already had instructions from endo to decrease his insulin  He has a follow up with Dr Jostin Manzo on 8 25 20

## 2020-08-20 NOTE — TELEPHONE ENCOUNTER
----- Message from Bill Martinez MD sent at 8/20/2020  8:34 AM EDT -----  Inform pt that the  test was normal  Keep usual follow up appt

## 2020-09-02 ENCOUNTER — OFFICE VISIT (OUTPATIENT)
Dept: INTERNAL MEDICINE CLINIC | Facility: CLINIC | Age: 75
End: 2020-09-02
Payer: MEDICARE

## 2020-09-02 VITALS
DIASTOLIC BLOOD PRESSURE: 80 MMHG | HEART RATE: 74 BPM | SYSTOLIC BLOOD PRESSURE: 130 MMHG | TEMPERATURE: 97.7 F | RESPIRATION RATE: 13 BRPM | WEIGHT: 239 LBS | BODY MASS INDEX: 29.1 KG/M2 | HEIGHT: 76 IN

## 2020-09-02 DIAGNOSIS — G89.29 CHRONIC LOW BACK PAIN WITHOUT SCIATICA, UNSPECIFIED BACK PAIN LATERALITY: ICD-10-CM

## 2020-09-02 DIAGNOSIS — E11.42 DIABETIC POLYNEUROPATHY ASSOCIATED WITH TYPE 2 DIABETES MELLITUS (HCC): ICD-10-CM

## 2020-09-02 DIAGNOSIS — I10 ESSENTIAL HYPERTENSION: ICD-10-CM

## 2020-09-02 DIAGNOSIS — E11.40 TYPE 2 DIABETES MELLITUS WITH DIABETIC NEUROPATHY, WITH LONG-TERM CURRENT USE OF INSULIN (HCC): Primary | ICD-10-CM

## 2020-09-02 DIAGNOSIS — M54.50 CHRONIC LOW BACK PAIN WITHOUT SCIATICA, UNSPECIFIED BACK PAIN LATERALITY: ICD-10-CM

## 2020-09-02 DIAGNOSIS — H61.23 CERUMEN DEBRIS ON TYMPANIC MEMBRANE OF BOTH EARS: ICD-10-CM

## 2020-09-02 DIAGNOSIS — Z79.4 TYPE 2 DIABETES MELLITUS WITH DIABETIC NEUROPATHY, WITH LONG-TERM CURRENT USE OF INSULIN (HCC): Primary | ICD-10-CM

## 2020-09-02 DIAGNOSIS — I25.10 CORONARY ARTERY DISEASE INVOLVING NATIVE CORONARY ARTERY OF NATIVE HEART WITHOUT ANGINA PECTORIS: ICD-10-CM

## 2020-09-02 DIAGNOSIS — N25.81 SECONDARY RENAL HYPERPARATHYROIDISM (HCC): ICD-10-CM

## 2020-09-02 DIAGNOSIS — E78.5 HYPERLIPIDEMIA, UNSPECIFIED HYPERLIPIDEMIA TYPE: ICD-10-CM

## 2020-09-02 DIAGNOSIS — J42 CHRONIC BRONCHITIS, UNSPECIFIED CHRONIC BRONCHITIS TYPE (HCC): ICD-10-CM

## 2020-09-02 PROCEDURE — 99214 OFFICE O/P EST MOD 30 MIN: CPT | Performed by: INTERNAL MEDICINE

## 2020-09-02 RX ORDER — GABAPENTIN 300 MG/1
CAPSULE ORAL
Qty: 360 CAPSULE | Refills: 1 | Status: SHIPPED | OUTPATIENT
Start: 2020-09-02 | End: 2021-01-06 | Stop reason: HOSPADM

## 2020-09-02 NOTE — PATIENT INSTRUCTIONS
Your labs are stable from the emergency room  Your blood pressure is control  Your diabetes continue monitoring  Will see her back in 6 weeks and give you the flu vaccine

## 2020-09-02 NOTE — PROGRESS NOTES
Assessment/Plan:    Problem 1  Diabetes and episodes of hypoglycemia does have endocrinology follow-up he does have a continues glucose monitoring now that alert sin when his sugar is high and long which is excellent he has no further episodes he went to the emergency room his glucose was 170 no change in plan    Problem 2  High blood pressure well control no chest palpitation shortness of breath  Continue the same    Problem 3  During the episode of hypoglycemia he fell down had almost a near syncopal episode he is having some knee discomfort he had bilateral total knee replacement the right knee is the 1 that her symptom OC had the knee replacement at least over 10 years ago he has seen orthopedic conservative management with physical therapy months IH pain medication he has increased his gabapentin to 600 mg twice a day he is tolerating and even with his renal dysfunction  I renew the medication    Problem 4  Complain of some neck discomfort I did not feel any lymphadenopathy I did examine his throat is clear but he does has cerumen in both external auditory canal will refer him to ENT for evaluation and cleaning    Coronary artery disease stable he has had no angina which is good news no evidence of heart failure    Chronic renal failure stage 3 renal function is stable reviewing the labs from the ER    Bladder cancer no hematuria no evidence of urinary tract infection so urologist recently and started him on flow     No problem-specific Assessment & Plan notes found for this encounter         Diagnoses and all orders for this visit:    Type 2 diabetes mellitus with diabetic neuropathy, with long-term current use of insulin ([address] )    Secondary renal hyperparathyroidism ([address] )    Diabetic polyneuropathy associated with type 2 diabetes mellitus (HCC)    Chronic bronchitis, unspecified chronic bronchitis type ([address] )    Essential hypertension    Coronary artery disease involving native coronary artery of native heart without angina pectoris    Hyperlipidemia, unspecified hyperlipidemia type          Subjective:      Patient ID: Fer Steele is a 66 y o  male  No chief complaint on file  Current Outpatient Medications:     ALPRAZolam (XANAX) 0 25 mg tablet, At twice a day as needed for anxiety, Disp: 30 tablet, Rfl: 0    aspirin 81 MG tablet, Take 81 mg by mouth daily  , Disp: , Rfl:     Azelastine HCl 0 15 % SOLN, Inhale 1 spray 2 (two) times a day, Disp: 30 mL, Rfl: 3    bismuth subsalicylate (PEPTO BISMOL) 262 MG chewable tablet, Chew 524 mg daily as needed for indigestion  , Disp: , Rfl:     Cholecalciferol 1 25 MG (90963 UT) capsule, Take 1 capsule (50,000 Units total) by mouth every 30 (thirty) days, Disp: 12 capsule, Rfl: 0    fluticasone (FLONASE) 50 mcg/act nasal spray, One spray in each nostril twice a day, Disp: 1 Bottle, Rfl: 3    furosemide (LASIX) 20 mg tablet, Take 1 tablet every other day or as needed for edema - instruction change 6/24/20, Disp: 90 tablet, Rfl: 1    gabapentin (NEURONTIN) 300 mg capsule, TAKE 2 CAPSULES BY MOUTH EVERY DAY AT BEDTIME, Disp: 180 capsule, Rfl: 1    Insulin Pen Needle 31G X 8 MM MISC, Inject 3 times a day, Disp: 100 each, Rfl: 2    isosorbide mononitrate (IMDUR) 30 mg 24 hr tablet, TAKE 3 TABLETS BY MOUTH EVERY DAY, Disp: 270 tablet, Rfl: 0    LANTUS SOLOSTAR 100 units/mL injection pen, 30 units qhs, Disp: 10 pen, Rfl: 1    loperamide (IMODIUM) 2 mg capsule, Take by mouth, Disp: , Rfl:     LUMIGAN 0 01 % ophthalmic drops, , Disp: , Rfl:     metoprolol succinate (TOPROL-XL) 100 mg 24 hr tablet, Take 100 mg by mouth daily  , Disp: , Rfl:     Mirabegron ER 50 MG TB24, Take 50 mg by mouth daily, Disp: , Rfl:     multivitamin (THERAGRAN) TABS, Take 1 tablet by mouth daily  , Disp: , Rfl:     NovoLOG FlexPen 100 units/mL SOPN, 10 units with each meal, Disp: 10 pen, Rfl: 1    omeprazole (PriLOSEC) 20 mg delayed release capsule, Take 1 capsule (20 mg total) by mouth daily, Disp: 90 capsule, Rfl: 1    sertraline (ZOLOFT) 50 mg tablet, Take 1 tablet (50 mg total) by mouth daily, Disp: 30 tablet, Rfl: 3    tamsulosin (FLOMAX) 0 4 mg, Take 1 capsule (0 4 mg total) by mouth daily with dinner, Disp: 90 capsule, Rfl: 3    tolterodine (DETROL LA) 4 mg 24 hr capsule, TK 1 C PO D, Disp: , Rfl:     HPI    The following portions of the patient's history were reviewed and updated as appropriate: allergies, current medications, past family history, past medical history, past social history, past surgical history and problem list     Review of Systems   Constitutional: Negative  Negative for activity change, appetite change, fatigue, fever and unexpected weight change  HENT: Negative for congestion, ear pain, hearing loss, mouth sores, postnasal drip, rhinorrhea, sore throat, trouble swallowing and voice change  Eyes: Negative for pain, redness and visual disturbance  Respiratory: Negative for cough, chest tightness, shortness of breath and wheezing  Cardiovascular: Negative for chest pain, palpitations and leg swelling  Gastrointestinal: Negative for abdominal distention, abdominal pain, blood in stool, constipation, diarrhea and nausea  Endocrine: Negative for cold intolerance, heat intolerance, polydipsia, polyphagia and polyuria  Genitourinary: Negative for difficulty urinating, dysuria, flank pain, frequency, hematuria and urgency  Musculoskeletal: Positive for neck pain  Negative for arthralgias, back pain, gait problem, joint swelling and myalgias  Knee pain on the right   Skin: Negative for color change and pallor  Neurological: Negative for dizziness, tremors, seizures, syncope, weakness, numbness and headaches  Hematological: Negative for adenopathy  Does not bruise/bleed easily  Psychiatric/Behavioral: Negative  Negative for sleep disturbance  The patient is not nervous/anxious  Objective:     There were no vitals taken for this visit      Physical Exam  Vitals signs and nursing note reviewed  Constitutional:       Appearance: He is well-developed  HENT:      Head: Normocephalic  Right Ear: External ear normal       Left Ear: External ear normal       Ears:      Comments: Cerumen in both external auditory canal more on the left than the right     Nose: Nose normal       Mouth/Throat:      Pharynx: No oropharyngeal exudate  Eyes:      Conjunctiva/sclera: Conjunctivae normal       Pupils: Pupils are equal, round, and reactive to light  Neck:      Musculoskeletal: Normal range of motion and neck supple  Thyroid: No thyromegaly  Comments: No cervical adenopathy palpable  Cardiovascular:      Rate and Rhythm: Normal rate and regular rhythm  Heart sounds: Normal heart sounds  No murmur  No friction rub  No gallop  Comments: S1-S2 regular rhythm  Extremities no edema    Pulmonary:      Effort: Pulmonary effort is normal  No respiratory distress  Breath sounds: Normal breath sounds  No wheezing or rales  Comments: Lungs are clear no wheezing rales or rhonchi  Abdominal:      General: Bowel sounds are normal  There is no distension  Palpations: Abdomen is soft  There is no mass  Tenderness: There is no abdominal tenderness  There is no guarding or rebound  Musculoskeletal: Normal range of motion  Lymphadenopathy:      Cervical: No cervical adenopathy  Skin:     General: Skin is warm and dry  Neurological:      Mental Status: He is alert and oriented to person, place, and time     Psychiatric:         Behavior: Behavior normal          Judgment: Judgment normal

## 2020-09-22 ENCOUNTER — TELEPHONE (OUTPATIENT)
Dept: UROLOGY | Facility: MEDICAL CENTER | Age: 75
End: 2020-09-22

## 2020-09-23 NOTE — TELEPHONE ENCOUNTER
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

## 2020-09-24 NOTE — TELEPHONE ENCOUNTER
I spoke to infectious disease  No need for referral to Infectious Disease yet  The positive AFB is probably related to his BCG

## 2020-09-24 NOTE — TELEPHONE ENCOUNTER
Madie with [facility] called stating they recommend pt be referred to infectious disease,she can be contacted directly cell# [phone]

## 2020-09-25 DIAGNOSIS — N30.00 ACUTE CYSTITIS WITHOUT HEMATURIA: Primary | ICD-10-CM

## 2020-09-25 NOTE — TELEPHONE ENCOUNTER
Call placed to [facility] at [facility]  Spoke with her and she informed me that according to Dr Raymundo Perkins, who is director of Infectious Disease, is requesting that a referral be placed to ID for management of this organism and care moving forward  She informed me that yesterday specimen was sent out for further testing to see what form of TB is growing  Informed her that I will relay this information to Dr César Thomason at this time for referral to ID

## 2020-09-28 RX ORDER — CALCITRIOL 0.25 UG/1
CAPSULE, LIQUID FILLED ORAL DAILY
COMMUNITY
Start: 2020-09-08 | End: 2021-05-07 | Stop reason: SDUPTHER

## 2020-09-29 DIAGNOSIS — I25.10 CORONARY ARTERY DISEASE INVOLVING NATIVE CORONARY ARTERY OF NATIVE HEART WITHOUT ANGINA PECTORIS: ICD-10-CM

## 2020-09-29 RX ORDER — ISOSORBIDE MONONITRATE 30 MG/1
TABLET, EXTENDED RELEASE ORAL
Qty: 270 TABLET | Refills: 0 | Status: SHIPPED | OUTPATIENT
Start: 2020-09-29 | End: 2021-01-06 | Stop reason: HOSPADM

## 2020-09-30 ENCOUNTER — TELEPHONE (OUTPATIENT)
Dept: UROLOGY | Facility: MEDICAL CENTER | Age: 75
End: 2020-09-30

## 2020-09-30 ENCOUNTER — PROCEDURE VISIT (OUTPATIENT)
Dept: UROLOGY | Facility: MEDICAL CENTER | Age: 75
End: 2020-09-30
Payer: MEDICARE

## 2020-09-30 VITALS
DIASTOLIC BLOOD PRESSURE: 68 MMHG | SYSTOLIC BLOOD PRESSURE: 122 MMHG | WEIGHT: 240 LBS | TEMPERATURE: 98.4 F | BODY MASS INDEX: 29.22 KG/M2 | HEIGHT: 76 IN

## 2020-09-30 DIAGNOSIS — T50.A95A: ICD-10-CM

## 2020-09-30 DIAGNOSIS — N30.80: ICD-10-CM

## 2020-09-30 DIAGNOSIS — C67.9 MALIGNANT NEOPLASM OF URINARY BLADDER, UNSPECIFIED SITE (HCC): Primary | ICD-10-CM

## 2020-09-30 PROCEDURE — 52000 CYSTOURETHROSCOPY: CPT | Performed by: UROLOGY

## 2020-09-30 PROCEDURE — 99214 OFFICE O/P EST MOD 30 MIN: CPT | Performed by: UROLOGY

## 2020-09-30 NOTE — ASSESSMENT & PLAN NOTE
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

## 2020-09-30 NOTE — TELEPHONE ENCOUNTER
Patient managed by Dr Charmaine Crane from [facility] called in requesting urine culture reports from 8/17/2020 to be faxed to [phone]  Frank Nelson can be reached at [phone]

## 2020-09-30 NOTE — PATIENT INSTRUCTIONS
Cystoscopy   WHAT YOU NEED TO KNOW:   A cystoscopy is a procedure to look inside of your urethra and bladder using a cystoscope  A cystoscope is a small tube with a light and magnifying camera on the end  The procedure is used to diagnose and treat conditions of the bladder, urethra, and prostate  The procedure is also done to remove stones or blood clots from the urethra or bladder  Your healthcare provider may do other tests, such as ureteroscopy, during a cystoscopy  DISCHARGE INSTRUCTIONS:   Call 911 if:   · You suddenly have chest pain or trouble breathing  Seek care immediately if:   · Your urine turns from pink to red, or you have clots in your urine  · You cannot urinate and your bladder feels full  · Your pain or burning becomes worse or lasts longer than 2 days  Contact your healthcare provider or urologist if:   · Your urine stays pink for longer than 3 days  · You urinate less than normal, or still feel like you have to urinate after you use the bathroom  · Your skin is itchy, swollen, or has a new rash  · You have a fever and chills  · You have questions or concerns about your condition or care  Medicines: You may  be given any of the following:  · Antibiotics  help treat or prevent a bacterial infection  · Acetaminophen  decreases pain and fever  It is available without a doctor's order  Ask how much to take and how often to take it  Follow directions  Read the labels of all other medicines you are using to see if they also contain acetaminophen, or ask your doctor or pharmacist  Acetaminophen can cause liver damage if not taken correctly  Do not use more than 4 grams (4,000 milligrams) total of acetaminophen in one day  · Take your medicine as directed  Contact your healthcare provider if you think your medicine is not helping or if you have side effects  Tell him or her if you are allergic to any medicine  Keep a list of the medicines, vitamins, and herbs you take  Include the amounts, and when and why you take them  Bring the list or the pill bottles to follow-up visits  Carry your medicine list with you in case of an emergency  Follow up with your healthcare provider as directed: You may need to have another cystoscopy  Write down your questions so you remember to ask them during your visits  Self-care:   · Drink at least 3 to 4 glasses of water daily for 2 days after your procedure  Do not drink acidic juices such as orange juice and lemonade  Drink water to help prevent blood clots from forming  It can also help decrease the amount of acid in your urine  Acid in your urine may increase the burning feeling when you urinate  · Sit in a warm tub of water  Warm water may relieve pain and bladder spasms  · Do not have sex  until your healthcare provider tells you it is okay  Sex may increase your risk for a urinary tract infection  © 2017 [address] Information is for End User's use only and may not be sold, redistributed or otherwise used for commercial purposes  All illustrations and images included in CareNotes® are the copyrighted property of A D A M , Inc  or Salvador Cooper  The above information is an  only  It is not intended as medical advice for individual conditions or treatments  Talk to your doctor, nurse or pharmacist before following any medical regimen to see if it is safe and effective for you

## 2020-09-30 NOTE — TELEPHONE ENCOUNTER
Per [facility] [place] request, 8/17/2020 Urine culture faxed to [facility]

## 2020-09-30 NOTE — H&P
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  They have recommended further evaluation with bladder biopsy and trans urethral biopsy of the prostate to exclude invasive tuberculosis infection versus symptoms from recurrent carcinoma in situ  If the patient does have granulomatous cystitis from BCG he will require 9 months of antituberculosis therapy  Referral to Infectious Disease has already been made  The options were discussed with the patient  He has elected to proceed with cystoscopy with bladder and prostate biopsies  The procedure was described in detail  Risks were discussed and consent form signed  Diagnoses and all orders for this visit:    Malignant neoplasm of urinary bladder, unspecified site [facility])  -     Case request operating room: Flip Tsang, and transurethral prostate biopsies; Standing  -     Case request operating room: Flip Tsang, and transurethral prostate biopsies    Cystitis due to intravesical BCG administration  -     Case request operating room: Flip Tsang, and transurethral prostate biopsies; Standing  -     Case request operating room: Flip Tsang, and transurethral prostate biopsies    Other orders  -     calcitriol (ROCALTROL) 0 25 mcg capsule  -     Diet NPO; Sips with meds; Standing  -     Place sequential compression device; Standing  -     Cystoscopy          Subjective:      Patient ID: Monet Jennings is a 66 y o  male  Chief complaint:  Dysuria, difficulty voiding     HPI:  67-year-old male who is followed at [facility] for carcinoma in situ the bladder  He is receiving maintenance BCG treatments  He received his last BCG in June  He reports that he has been having more more symptoms after his maintenance BCG treatments    At the present time he has difficulty voiding with slow urinary stream, frequency, bladder pain and dysuria  He is very unhappy with his voiding pattern  He is getting up frequently at night to urinate  The voiding pain causes difficulty sleeping  He has no fever, flank pain and there is no gross hematuria  Since his last visit he has been treated with ciprofloxacin with only minimal improvement  A urine culture is negative  Urine cytology was benign  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  The patient is seen today for cystoscopy  The following portions of the patient's history were reviewed and updated as appropriate: allergies, current medications, past family history, past medical history, past social history, past surgical history and problem list     Review of Systems   Constitutional: Negative  Negative for chills, diaphoresis, fatigue and fever  HENT: Negative  Eyes: Negative  Respiratory: Negative  Cardiovascular: Negative  Endocrine: Negative  Genitourinary:        See HPI   Musculoskeletal: Negative  Skin: Negative  Allergic/Immunologic: Negative  Neurological: Negative  Hematological: Negative  Psychiatric/Behavioral: Negative  Objective:      /68   Temp 98 4 °F (36 9 °C)   Ht 6' 4" (1 93 m)   Wt 109 kg (240 lb)   BMI 29 21 kg/m²          Physical Exam  Vitals signs reviewed  Constitutional:       General: He is not in acute distress  Appearance: Normal appearance  He is well-developed and normal weight  He is not ill-appearing, toxic-appearing or diaphoretic  HENT:      Head: Normocephalic and atraumatic  Eyes:      General: No scleral icterus  Conjunctiva/sclera: Conjunctivae normal    Neck:      Musculoskeletal: Neck supple  Cardiovascular:      Rate and Rhythm: Normal rate  Pulmonary:      Effort: Pulmonary effort is normal    Abdominal:      General: Bowel sounds are normal  There is no distension  Palpations: Abdomen is soft  There is no mass  Tenderness:  There is no abdominal tenderness  There is no right CVA tenderness, left CVA tenderness, guarding or rebound  Hernia: No hernia is present  Genitourinary:     Penis: Normal  No phimosis or hypospadias  Scrotum/Testes: Normal          Right: Mass not present  Left: Mass not present  Skin:     General: Skin is warm and dry  Neurological:      Mental Status: He is alert and oriented to person, place, and time  Psychiatric:         Behavior: Behavior normal          Thought Content: Thought content normal          Judgment: Judgment normal            Cystoscopy    Date/Time: 9/30/2020 4:37 PM  Performed by: Alice Haas MD  Authorized by: Alice Haas MD     Procedure details: cystoscopy    Patient tolerance: Patient tolerated the procedure well with no immediate complications    Additional Procedure Details:      Patient presents for cystoscopy  I have discussed the reasons for doing the test, and the potential risks and complications  Patient expressed understanding, and signed informed consent document  The patient was carefully  positioned supine on the examining table  Sterile preparation was performed on the urethra  Xylocaine jelly was instilled and left  Indwelling for the procedure  The 13 Serbian flexible cystoscope was passed with the following findings:      Urethra:  Normal without stricture    Prostate:  lateral lobes occlusive x 3 cm                  median lobe- small    Bladder: Moderate trabeculation with areas of erythema, no papillary lesions, tumor, or stones  Residual urine: Moderate    Patient tolerated the procedure well and was escorted from the examining table

## 2020-09-30 NOTE — LETTER
September 30, 2020     Chrissie Ladd MD  [address]  [place]  [address]    Patient: Yamilex Banks   YOB: 1945   Date of Visit: 9/30/2020       Dear Dr Santos All: Thank you for referring Mello Kagn to me for evaluation  Below are my notes for this consultation  If you have questions, please do not hesitate to call me  I look forward to following your patient along with you  Sincerely,        Leslie Rosales MD        CC: No Recipients  Leslie Rosales MD  9/30/2020  4:43 PM  Sign when Signing Visit  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  They have recommended further evaluation with bladder biopsy and trans urethral biopsy of the prostate to exclude invasive tuberculosis infection versus symptoms from recurrent carcinoma in situ  If the patient does have granulomatous cystitis from BCG he will require 9 months of antituberculosis therapy  Referral to Infectious Disease has already been made  The options were discussed with the patient  He has elected to proceed with cystoscopy with bladder and prostate biopsies  The procedure was described in detail  Risks were discussed and consent form signed         Diagnoses and all orders for this visit:    Malignant neoplasm of urinary bladder, unspecified site [facility])  -     Case request operating room: Flip Tsang, and transurethral prostate biopsies; Standing  -     Case request operating room: Flip Tsang, and transurethral prostate biopsies    Cystitis due to intravesical BCG administration  -     Case request operating room: Flip Tsang, and transurethral prostate biopsies; Standing  -     Case request operating room: Flip Tsang, and transurethral prostate biopsies    Other orders  -     calcitriol (ROCALTROL) 0 25 mcg capsule  -     Diet NPO; Sips with meds; Standing  -     Place sequential compression device; Standing  -     Cystoscopy          Subjective:      Patient ID: Francisco Harden is a 66 y o  male  Chief complaint:  Dysuria, difficulty voiding     HPI:  75-year-old male who is followed at [facility] for carcinoma in situ the bladder  He is receiving maintenance BCG treatments  He received his last BCG in June  He reports that he has been having more more symptoms after his maintenance BCG treatments  At the present time he has difficulty voiding with slow urinary stream, frequency, bladder pain and dysuria  He is very unhappy with his voiding pattern  He is getting up frequently at night to urinate  The voiding pain causes difficulty sleeping  He has no fever, flank pain and there is no gross hematuria  Since his last visit he has been treated with ciprofloxacin with only minimal improvement  A urine culture is negative  Urine cytology was benign  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  The patient is seen today for cystoscopy  The following portions of the patient's history were reviewed and updated as appropriate: allergies, current medications, past family history, past medical history, past social history, past surgical history and problem list     Review of Systems   Constitutional: Negative  Negative for chills, diaphoresis, fatigue and fever  HENT: Negative  Eyes: Negative  Respiratory: Negative  Cardiovascular: Negative  Endocrine: Negative  Genitourinary:        See HPI   Musculoskeletal: Negative  Skin: Negative  Allergic/Immunologic: Negative  Neurological: Negative  Hematological: Negative  Psychiatric/Behavioral: Negative  Objective:      /68   Temp 98 4 °F (36 9 °C)   Ht 6' 4" (1 93 m)   Wt 109 kg (240 lb)   BMI 29 21 kg/m²          Physical Exam  Vitals signs reviewed  Constitutional:       General: He is not in acute distress       Appearance: Normal appearance  He is well-developed and normal weight  He is not ill-appearing, toxic-appearing or diaphoretic  HENT:      Head: Normocephalic and atraumatic  Eyes:      General: No scleral icterus  Conjunctiva/sclera: Conjunctivae normal    Neck:      Musculoskeletal: Neck supple  Cardiovascular:      Rate and Rhythm: Normal rate  Pulmonary:      Effort: Pulmonary effort is normal    Abdominal:      General: Bowel sounds are normal  There is no distension  Palpations: Abdomen is soft  There is no mass  Tenderness: There is no abdominal tenderness  There is no right CVA tenderness, left CVA tenderness, guarding or rebound  Hernia: No hernia is present  Genitourinary:     Penis: Normal  No phimosis or hypospadias  Scrotum/Testes: Normal          Right: Mass not present  Left: Mass not present  Skin:     General: Skin is warm and dry  Neurological:      Mental Status: He is alert and oriented to person, place, and time  Psychiatric:         Behavior: Behavior normal          Thought Content: Thought content normal          Judgment: Judgment normal            Cystoscopy    Date/Time: 9/30/2020 4:37 PM  Performed by: Climmie Lombard, MD  Authorized by: Climmie Lombard, MD     Procedure details: cystoscopy    Patient tolerance: Patient tolerated the procedure well with no immediate complications    Additional Procedure Details:      Patient presents for cystoscopy  I have discussed the reasons for doing the test, and the potential risks and complications  Patient expressed understanding, and signed informed consent document  The patient was carefully  positioned supine on the examining table  Sterile preparation was performed on the urethra  Xylocaine jelly was instilled and left  Indwelling for the procedure    The 13 Dominican flexible cystoscope was passed with the following findings:      Urethra:  Normal without stricture    Prostate:  lateral lobes occlusive x 3 cm                  median lobe- small    Bladder: Moderate trabeculation with areas of erythema, no papillary lesions, tumor, or stones  Residual urine: Moderate    Patient tolerated the procedure well and was escorted from the examining table

## 2020-10-05 ENCOUNTER — DOCUMENTATION (OUTPATIENT)
Dept: ENDOCRINOLOGY | Facility: HOSPITAL | Age: 75
End: 2020-10-05

## 2020-10-08 ENCOUNTER — APPOINTMENT (OUTPATIENT)
Dept: MRI IMAGING | Facility: HOSPITAL | Age: 75
DRG: 638 | End: 2020-10-08
Payer: MEDICARE

## 2020-10-08 ENCOUNTER — APPOINTMENT (EMERGENCY)
Dept: RADIOLOGY | Facility: HOSPITAL | Age: 75
DRG: 638 | End: 2020-10-08
Payer: MEDICARE

## 2020-10-08 ENCOUNTER — HOSPITAL ENCOUNTER (INPATIENT)
Facility: HOSPITAL | Age: 75
LOS: 3 days | Discharge: HOME | DRG: 638 | End: 2020-10-11
Attending: EMERGENCY MEDICINE | Admitting: PODIATRIST
Payer: MEDICARE

## 2020-10-08 DIAGNOSIS — N18.4 CHRONIC KIDNEY DISEASE, STAGE 4 (SEVERE) (HCC): ICD-10-CM

## 2020-10-08 DIAGNOSIS — C67.9 BLADDER CANCER (HCC): ICD-10-CM

## 2020-10-08 DIAGNOSIS — E11.621 DIABETIC FOOT ULCER (HCC): ICD-10-CM

## 2020-10-08 DIAGNOSIS — R30.0 DYSURIA: ICD-10-CM

## 2020-10-08 DIAGNOSIS — N17.9 AKI (ACUTE KIDNEY INJURY) (HCC): ICD-10-CM

## 2020-10-08 DIAGNOSIS — L97.426 DIABETIC ULCER OF LEFT MIDFOOT ASSOCIATED WITH TYPE 2 DIABETES MELLITUS, WITH BONE INVOLVEMENT WITHOUT EVIDENCE OF NECROSIS (HCC): ICD-10-CM

## 2020-10-08 DIAGNOSIS — N18.30 STAGE 3 CHRONIC KIDNEY DISEASE, UNSPECIFIED WHETHER STAGE 3A OR 3B CKD (HCC): Primary | ICD-10-CM

## 2020-10-08 DIAGNOSIS — Z95.5 PRESENCE OF STENT IN CORONARY ARTERY: ICD-10-CM

## 2020-10-08 DIAGNOSIS — L97.509 DIABETIC FOOT ULCER (HCC): ICD-10-CM

## 2020-10-08 DIAGNOSIS — E11.621 DIABETIC ULCER OF LEFT MIDFOOT ASSOCIATED WITH TYPE 2 DIABETES MELLITUS, WITH BONE INVOLVEMENT WITHOUT EVIDENCE OF NECROSIS (HCC): ICD-10-CM

## 2020-10-08 DIAGNOSIS — E11.40 TYPE 2 DIABETES MELLITUS WITH DIABETIC NEUROPATHY, WITH LONG-TERM CURRENT USE OF INSULIN (HCC): ICD-10-CM

## 2020-10-08 DIAGNOSIS — Z79.4 TYPE 2 DIABETES MELLITUS WITH DIABETIC NEUROPATHY, WITH LONG-TERM CURRENT USE OF INSULIN (HCC): ICD-10-CM

## 2020-10-08 DIAGNOSIS — I25.10 CORONARY ARTERY DISEASE INVOLVING NATIVE CORONARY ARTERY OF NATIVE HEART WITHOUT ANGINA PECTORIS: ICD-10-CM

## 2020-10-08 PROBLEM — L97.526 DIABETIC ULCER OF LEFT FOOT ASSOCIATED WITH TYPE 2 DIABETES MELLITUS, WITH BONE INVOLVEMENT WITHOUT EVIDENCE OF NECROSIS (HCC): Status: ACTIVE | Noted: 2020-10-08

## 2020-10-08 LAB
ALBUMIN SERPL BCP-MCNC: 3.6 G/DL (ref 3.5–5)
ALP SERPL-CCNC: 109 U/L (ref 46–116)
ALT SERPL W P-5'-P-CCNC: 37 U/L (ref 12–78)
ANION GAP SERPL CALCULATED.3IONS-SCNC: 5 MMOL/L (ref 4–13)
AST SERPL W P-5'-P-CCNC: 23 U/L (ref 5–45)
BASOPHILS # BLD AUTO: 0.05 THOUSANDS/ΜL (ref 0–0.1)
BASOPHILS NFR BLD AUTO: 1 % (ref 0–1)
BILIRUB SERPL-MCNC: 0.37 MG/DL (ref 0.2–1)
BUN SERPL-MCNC: 31 MG/DL (ref 5–25)
CALCIUM SERPL-MCNC: 9 MG/DL (ref 8.3–10.1)
CHLORIDE SERPL-SCNC: 107 MMOL/L (ref 100–108)
CO2 SERPL-SCNC: 28 MMOL/L (ref 21–32)
CREAT SERPL-MCNC: 2.8 MG/DL (ref 0.6–1.3)
EOSINOPHIL # BLD AUTO: 0.22 THOUSAND/ΜL (ref 0–0.61)
EOSINOPHIL NFR BLD AUTO: 2 % (ref 0–6)
ERYTHROCYTE [DISTWIDTH] IN BLOOD BY AUTOMATED COUNT: 12.9 % (ref 11.6–15.1)
GFR SERPL CREATININE-BSD FRML MDRD: 24 ML/MIN/1.73SQ M
GLUCOSE SERPL-MCNC: 249 MG/DL (ref 65–140)
GLUCOSE SERPL-MCNC: 63 MG/DL (ref 65–140)
GLUCOSE SERPL-MCNC: 75 MG/DL (ref 65–140)
GLUCOSE SERPL-MCNC: 86 MG/DL (ref 65–140)
GLUCOSE SERPL-MCNC: 92 MG/DL (ref 65–140)
HCT VFR BLD AUTO: 34.9 % (ref 36.5–49.3)
HGB BLD-MCNC: 11.5 G/DL (ref 12–17)
IMM GRANULOCYTES # BLD AUTO: 0.03 THOUSAND/UL (ref 0–0.2)
IMM GRANULOCYTES NFR BLD AUTO: 0 % (ref 0–2)
LYMPHOCYTES # BLD AUTO: 2.35 THOUSANDS/ΜL (ref 0.6–4.47)
LYMPHOCYTES NFR BLD AUTO: 24 % (ref 14–44)
MCH RBC QN AUTO: 31.3 PG (ref 26.8–34.3)
MCHC RBC AUTO-ENTMCNC: 33 G/DL (ref 31.4–37.4)
MCV RBC AUTO: 95 FL (ref 82–98)
MONOCYTES # BLD AUTO: 0.81 THOUSAND/ΜL (ref 0.17–1.22)
MONOCYTES NFR BLD AUTO: 8 % (ref 4–12)
NEUTROPHILS # BLD AUTO: 6.47 THOUSANDS/ΜL (ref 1.85–7.62)
NEUTS SEG NFR BLD AUTO: 65 % (ref 43–75)
NRBC BLD AUTO-RTO: 0 /100 WBCS
PLATELET # BLD AUTO: 150 THOUSANDS/UL (ref 149–390)
PMV BLD AUTO: 10.7 FL (ref 8.9–12.7)
POTASSIUM SERPL-SCNC: 4.2 MMOL/L (ref 3.5–5.3)
PROT SERPL-MCNC: 7.5 G/DL (ref 6.4–8.2)
RBC # BLD AUTO: 3.67 MILLION/UL (ref 3.88–5.62)
SODIUM SERPL-SCNC: 140 MMOL/L (ref 136–145)
WBC # BLD AUTO: 9.93 THOUSAND/UL (ref 4.31–10.16)

## 2020-10-08 PROCEDURE — 99285 EMERGENCY DEPT VISIT HI MDM: CPT | Performed by: EMERGENCY MEDICINE

## 2020-10-08 PROCEDURE — 87186 SC STD MICRODIL/AGAR DIL: CPT | Performed by: STUDENT IN AN ORGANIZED HEALTH CARE EDUCATION/TRAINING PROGRAM

## 2020-10-08 PROCEDURE — 87070 CULTURE OTHR SPECIMN AEROBIC: CPT | Performed by: STUDENT IN AN ORGANIZED HEALTH CARE EDUCATION/TRAINING PROGRAM

## 2020-10-08 PROCEDURE — 82948 REAGENT STRIP/BLOOD GLUCOSE: CPT

## 2020-10-08 PROCEDURE — 73718 MRI LOWER EXTREMITY W/O DYE: CPT

## 2020-10-08 PROCEDURE — 85025 COMPLETE CBC W/AUTO DIFF WBC: CPT | Performed by: EMERGENCY MEDICINE

## 2020-10-08 PROCEDURE — 99285 EMERGENCY DEPT VISIT HI MDM: CPT

## 2020-10-08 PROCEDURE — 87077 CULTURE AEROBIC IDENTIFY: CPT | Performed by: STUDENT IN AN ORGANIZED HEALTH CARE EDUCATION/TRAINING PROGRAM

## 2020-10-08 PROCEDURE — G1004 CDSM NDSC: HCPCS

## 2020-10-08 PROCEDURE — 99223 1ST HOSP IP/OBS HIGH 75: CPT | Performed by: INTERNAL MEDICINE

## 2020-10-08 PROCEDURE — 87205 SMEAR GRAM STAIN: CPT | Performed by: STUDENT IN AN ORGANIZED HEALTH CARE EDUCATION/TRAINING PROGRAM

## 2020-10-08 PROCEDURE — 80053 COMPREHEN METABOLIC PANEL: CPT | Performed by: EMERGENCY MEDICINE

## 2020-10-08 PROCEDURE — 73630 X-RAY EXAM OF FOOT: CPT

## 2020-10-08 PROCEDURE — 36415 COLL VENOUS BLD VENIPUNCTURE: CPT | Performed by: EMERGENCY MEDICINE

## 2020-10-08 RX ORDER — SENNOSIDES 8.6 MG
1 TABLET ORAL DAILY
Status: DISCONTINUED | OUTPATIENT
Start: 2020-10-09 | End: 2020-10-11 | Stop reason: HOSPADM

## 2020-10-08 RX ORDER — TAMSULOSIN HYDROCHLORIDE 0.4 MG/1
0.4 CAPSULE ORAL
Status: DISCONTINUED | OUTPATIENT
Start: 2020-10-09 | End: 2020-10-11 | Stop reason: HOSPADM

## 2020-10-08 RX ORDER — HEPARIN SODIUM 5000 [USP'U]/ML
5000 INJECTION, SOLUTION INTRAVENOUS; SUBCUTANEOUS EVERY 8 HOURS SCHEDULED
Status: DISCONTINUED | OUTPATIENT
Start: 2020-10-08 | End: 2020-10-11 | Stop reason: HOSPADM

## 2020-10-08 RX ORDER — INSULIN GLARGINE 100 [IU]/ML
30 INJECTION, SOLUTION SUBCUTANEOUS
Status: DISCONTINUED | OUTPATIENT
Start: 2020-10-08 | End: 2020-10-11

## 2020-10-08 RX ORDER — MELATONIN
1000 DAILY
Status: DISCONTINUED | OUTPATIENT
Start: 2020-10-09 | End: 2020-10-11 | Stop reason: HOSPADM

## 2020-10-08 RX ORDER — POLYETHYLENE GLYCOL 3350 17 G/17G
17 POWDER, FOR SOLUTION ORAL DAILY PRN
Status: DISCONTINUED | OUTPATIENT
Start: 2020-10-08 | End: 2020-10-11 | Stop reason: HOSPADM

## 2020-10-08 RX ORDER — OXYBUTYNIN CHLORIDE 10 MG/1
10 TABLET, EXTENDED RELEASE ORAL DAILY
Status: DISCONTINUED | OUTPATIENT
Start: 2020-10-09 | End: 2020-10-11 | Stop reason: HOSPADM

## 2020-10-08 RX ORDER — LOPERAMIDE HYDROCHLORIDE 2 MG/1
2 CAPSULE ORAL 3 TIMES DAILY PRN
Status: DISCONTINUED | OUTPATIENT
Start: 2020-10-08 | End: 2020-10-11 | Stop reason: HOSPADM

## 2020-10-08 RX ORDER — METOPROLOL SUCCINATE 50 MG/1
100 TABLET, EXTENDED RELEASE ORAL DAILY
Status: DISCONTINUED | OUTPATIENT
Start: 2020-10-09 | End: 2020-10-11 | Stop reason: HOSPADM

## 2020-10-08 RX ORDER — GABAPENTIN 100 MG/1
100 CAPSULE ORAL 3 TIMES DAILY
Status: DISCONTINUED | OUTPATIENT
Start: 2020-10-08 | End: 2020-10-11 | Stop reason: HOSPADM

## 2020-10-08 RX ORDER — ACETAMINOPHEN 325 MG/1
650 TABLET ORAL EVERY 6 HOURS PRN
Status: DISCONTINUED | OUTPATIENT
Start: 2020-10-08 | End: 2020-10-11 | Stop reason: HOSPADM

## 2020-10-08 RX ORDER — PANTOPRAZOLE SODIUM 40 MG/1
40 TABLET, DELAYED RELEASE ORAL
Status: DISCONTINUED | OUTPATIENT
Start: 2020-10-09 | End: 2020-10-11 | Stop reason: HOSPADM

## 2020-10-08 RX ORDER — FLUTICASONE PROPIONATE 50 MCG
1 SPRAY, SUSPENSION (ML) NASAL 2 TIMES DAILY
Status: DISCONTINUED | OUTPATIENT
Start: 2020-10-08 | End: 2020-10-11 | Stop reason: HOSPADM

## 2020-10-08 RX ORDER — ASPIRIN 81 MG/1
81 TABLET, CHEWABLE ORAL DAILY
Status: DISCONTINUED | OUTPATIENT
Start: 2020-10-09 | End: 2020-10-11 | Stop reason: HOSPADM

## 2020-10-08 RX ORDER — ONDANSETRON 2 MG/ML
4 INJECTION INTRAMUSCULAR; INTRAVENOUS EVERY 6 HOURS PRN
Status: DISCONTINUED | OUTPATIENT
Start: 2020-10-08 | End: 2020-10-11 | Stop reason: HOSPADM

## 2020-10-08 RX ORDER — ALPRAZOLAM 0.5 MG/1
0.5 TABLET ORAL 2 TIMES DAILY PRN
Status: DISCONTINUED | OUTPATIENT
Start: 2020-10-08 | End: 2020-10-11 | Stop reason: HOSPADM

## 2020-10-08 RX ADMIN — BIMATOPROST 1 DROP: 0.1 SOLUTION/ DROPS OPHTHALMIC at 23:53

## 2020-10-08 RX ADMIN — INSULIN LISPRO 2 UNITS: 100 INJECTION, SOLUTION INTRAVENOUS; SUBCUTANEOUS at 23:50

## 2020-10-08 RX ADMIN — ACETAMINOPHEN 650 MG: 325 TABLET ORAL at 23:49

## 2020-10-08 RX ADMIN — INSULIN GLARGINE 30 UNITS: 100 INJECTION, SOLUTION SUBCUTANEOUS at 23:54

## 2020-10-08 RX ADMIN — GABAPENTIN 100 MG: 100 CAPSULE ORAL at 23:49

## 2020-10-08 RX ADMIN — FLUTICASONE PROPIONATE 1 SPRAY: 50 SPRAY, METERED NASAL at 23:52

## 2020-10-08 RX ADMIN — HEPARIN SODIUM 5000 UNITS: 5000 INJECTION INTRAVENOUS; SUBCUTANEOUS at 23:54

## 2020-10-09 LAB
ANION GAP SERPL CALCULATED.3IONS-SCNC: 5 MMOL/L (ref 4–13)
BUN SERPL-MCNC: 29 MG/DL (ref 5–25)
CALCIUM SERPL-MCNC: 8.4 MG/DL (ref 8.3–10.1)
CHLORIDE SERPL-SCNC: 105 MMOL/L (ref 100–108)
CO2 SERPL-SCNC: 28 MMOL/L (ref 21–32)
CREAT SERPL-MCNC: 2.69 MG/DL (ref 0.6–1.3)
ERYTHROCYTE [DISTWIDTH] IN BLOOD BY AUTOMATED COUNT: 12.7 % (ref 11.6–15.1)
GFR SERPL CREATININE-BSD FRML MDRD: 25 ML/MIN/1.73SQ M
GLUCOSE SERPL-MCNC: 101 MG/DL (ref 65–140)
GLUCOSE SERPL-MCNC: 133 MG/DL (ref 65–140)
GLUCOSE SERPL-MCNC: 157 MG/DL (ref 65–140)
GLUCOSE SERPL-MCNC: 162 MG/DL (ref 65–140)
GLUCOSE SERPL-MCNC: 166 MG/DL (ref 65–140)
HCT VFR BLD AUTO: 34.4 % (ref 36.5–49.3)
HGB BLD-MCNC: 11.2 G/DL (ref 12–17)
MCH RBC QN AUTO: 30.7 PG (ref 26.8–34.3)
MCHC RBC AUTO-ENTMCNC: 32.6 G/DL (ref 31.4–37.4)
MCV RBC AUTO: 94 FL (ref 82–98)
PLATELET # BLD AUTO: 146 THOUSANDS/UL (ref 149–390)
PMV BLD AUTO: 10.2 FL (ref 8.9–12.7)
POTASSIUM SERPL-SCNC: 4.1 MMOL/L (ref 3.5–5.3)
RBC # BLD AUTO: 3.65 MILLION/UL (ref 3.88–5.62)
SODIUM SERPL-SCNC: 138 MMOL/L (ref 136–145)
WBC # BLD AUTO: 8.04 THOUSAND/UL (ref 4.31–10.16)

## 2020-10-09 PROCEDURE — 80048 BASIC METABOLIC PNL TOTAL CA: CPT | Performed by: STUDENT IN AN ORGANIZED HEALTH CARE EDUCATION/TRAINING PROGRAM

## 2020-10-09 PROCEDURE — 90662 IIV NO PRSV INCREASED AG IM: CPT | Performed by: INTERNAL MEDICINE

## 2020-10-09 PROCEDURE — 97163 PT EVAL HIGH COMPLEX 45 MIN: CPT

## 2020-10-09 PROCEDURE — 99232 SBSQ HOSP IP/OBS MODERATE 35: CPT | Performed by: PHYSICIAN ASSISTANT

## 2020-10-09 PROCEDURE — 82948 REAGENT STRIP/BLOOD GLUCOSE: CPT

## 2020-10-09 PROCEDURE — 85027 COMPLETE CBC AUTOMATED: CPT | Performed by: STUDENT IN AN ORGANIZED HEALTH CARE EDUCATION/TRAINING PROGRAM

## 2020-10-09 PROCEDURE — G0008 ADMIN INFLUENZA VIRUS VAC: HCPCS | Performed by: INTERNAL MEDICINE

## 2020-10-09 RX ORDER — CEFAZOLIN SODIUM 2 G/50ML
2000 SOLUTION INTRAVENOUS EVERY 12 HOURS
Status: DISCONTINUED | OUTPATIENT
Start: 2020-10-09 | End: 2020-10-11 | Stop reason: HOSPADM

## 2020-10-09 RX ADMIN — ISOSORBIDE MONONITRATE 90 MG: 60 TABLET, EXTENDED RELEASE ORAL at 08:15

## 2020-10-09 RX ADMIN — ASPIRIN 81 MG CHEWABLE TABLET 81 MG: 81 TABLET CHEWABLE at 08:15

## 2020-10-09 RX ADMIN — INSULIN LISPRO 10 UNITS: 100 INJECTION, SOLUTION INTRAVENOUS; SUBCUTANEOUS at 12:02

## 2020-10-09 RX ADMIN — ACETAMINOPHEN 650 MG: 325 TABLET ORAL at 20:30

## 2020-10-09 RX ADMIN — HEPARIN SODIUM 5000 UNITS: 5000 INJECTION INTRAVENOUS; SUBCUTANEOUS at 21:16

## 2020-10-09 RX ADMIN — INSULIN LISPRO 1 UNITS: 100 INJECTION, SOLUTION INTRAVENOUS; SUBCUTANEOUS at 21:17

## 2020-10-09 RX ADMIN — CEFAZOLIN SODIUM 2000 MG: 2 SOLUTION INTRAVENOUS at 19:27

## 2020-10-09 RX ADMIN — INSULIN LISPRO 1 UNITS: 100 INJECTION, SOLUTION INTRAVENOUS; SUBCUTANEOUS at 16:42

## 2020-10-09 RX ADMIN — ACETAMINOPHEN 650 MG: 325 TABLET ORAL at 14:22

## 2020-10-09 RX ADMIN — INSULIN GLARGINE 30 UNITS: 100 INJECTION, SOLUTION SUBCUTANEOUS at 21:17

## 2020-10-09 RX ADMIN — FLUTICASONE PROPIONATE 1 SPRAY: 50 SPRAY, METERED NASAL at 08:16

## 2020-10-09 RX ADMIN — SERTRALINE HYDROCHLORIDE 50 MG: 50 TABLET, FILM COATED ORAL at 08:15

## 2020-10-09 RX ADMIN — GABAPENTIN 100 MG: 100 CAPSULE ORAL at 16:40

## 2020-10-09 RX ADMIN — GABAPENTIN 100 MG: 100 CAPSULE ORAL at 20:29

## 2020-10-09 RX ADMIN — TAMSULOSIN HYDROCHLORIDE 0.4 MG: 0.4 CAPSULE ORAL at 16:40

## 2020-10-09 RX ADMIN — METOPROLOL SUCCINATE 100 MG: 50 TABLET, EXTENDED RELEASE ORAL at 08:16

## 2020-10-09 RX ADMIN — INFLUENZA A VIRUS A/MICHIGAN/45/2015 X-275 (H1N1) ANTIGEN (FORMALDEHYDE INACTIVATED), INFLUENZA A VIRUS A/SINGAPORE/INFIMH-16-0019/2016 IVR-186 (H3N2) ANTIGEN (FORMALDEHYDE INACTIVATED), INFLUENZA B VIRUS B/PHUKET/3073/2013 ANTIGEN (FORMALDEHYDE INACTIVATED), AND INFLUENZA B VIRUS B/MARYLAND/15/2016 BX-69A ANTIGEN (FORMALDEHYDE INACTIVATED) 0.7 ML: 60; 60; 60; 60 INJECTION, SUSPENSION INTRAMUSCULAR at 07:56

## 2020-10-09 RX ADMIN — HEPARIN SODIUM 5000 UNITS: 5000 INJECTION INTRAVENOUS; SUBCUTANEOUS at 06:32

## 2020-10-09 RX ADMIN — PANTOPRAZOLE SODIUM 40 MG: 40 TABLET, DELAYED RELEASE ORAL at 06:32

## 2020-10-09 RX ADMIN — BIMATOPROST 1 DROP: 0.1 SOLUTION/ DROPS OPHTHALMIC at 21:17

## 2020-10-09 RX ADMIN — GABAPENTIN 100 MG: 100 CAPSULE ORAL at 08:15

## 2020-10-09 RX ADMIN — INSULIN LISPRO 1 UNITS: 100 INJECTION, SOLUTION INTRAVENOUS; SUBCUTANEOUS at 12:01

## 2020-10-09 RX ADMIN — SENNOSIDES 8.6 MG: 8.6 TABLET, FILM COATED ORAL at 08:15

## 2020-10-09 RX ADMIN — Medication 1000 UNITS: at 08:15

## 2020-10-09 RX ADMIN — CEFAZOLIN SODIUM 2000 MG: 2 SOLUTION INTRAVENOUS at 07:56

## 2020-10-09 RX ADMIN — SODIUM CHLORIDE 1000 ML: 0.9 INJECTION, SOLUTION INTRAVENOUS at 06:37

## 2020-10-09 RX ADMIN — OXYBUTYNIN 10 MG: 10 TABLET, FILM COATED, EXTENDED RELEASE ORAL at 08:15

## 2020-10-09 RX ADMIN — HEPARIN SODIUM 5000 UNITS: 5000 INJECTION INTRAVENOUS; SUBCUTANEOUS at 14:22

## 2020-10-10 PROBLEM — D64.9 CHRONIC ANEMIA: Status: ACTIVE | Noted: 2020-10-10

## 2020-10-10 PROBLEM — N17.9 AKI (ACUTE KIDNEY INJURY) (HCC): Status: RESOLVED | Noted: 2019-02-04 | Resolved: 2020-10-10

## 2020-10-10 LAB
ANION GAP SERPL CALCULATED.3IONS-SCNC: 6 MMOL/L (ref 4–13)
BUN SERPL-MCNC: 30 MG/DL (ref 5–25)
CALCIUM SERPL-MCNC: 8.4 MG/DL (ref 8.3–10.1)
CHLORIDE SERPL-SCNC: 106 MMOL/L (ref 100–108)
CO2 SERPL-SCNC: 26 MMOL/L (ref 21–32)
CREAT SERPL-MCNC: 2.48 MG/DL (ref 0.6–1.3)
ERYTHROCYTE [DISTWIDTH] IN BLOOD BY AUTOMATED COUNT: 12.7 % (ref 11.6–15.1)
GFR SERPL CREATININE-BSD FRML MDRD: 28 ML/MIN/1.73SQ M
GLUCOSE SERPL-MCNC: 103 MG/DL (ref 65–140)
GLUCOSE SERPL-MCNC: 118 MG/DL (ref 65–140)
GLUCOSE SERPL-MCNC: 211 MG/DL (ref 65–140)
GLUCOSE SERPL-MCNC: 92 MG/DL (ref 65–140)
GLUCOSE SERPL-MCNC: 94 MG/DL (ref 65–140)
HCT VFR BLD AUTO: 32.5 % (ref 36.5–49.3)
HGB BLD-MCNC: 10.6 G/DL (ref 12–17)
MCH RBC QN AUTO: 31 PG (ref 26.8–34.3)
MCHC RBC AUTO-ENTMCNC: 32.6 G/DL (ref 31.4–37.4)
MCV RBC AUTO: 95 FL (ref 82–98)
PLATELET # BLD AUTO: 145 THOUSANDS/UL (ref 149–390)
PMV BLD AUTO: 10.7 FL (ref 8.9–12.7)
POTASSIUM SERPL-SCNC: 4.6 MMOL/L (ref 3.5–5.3)
RBC # BLD AUTO: 3.42 MILLION/UL (ref 3.88–5.62)
SODIUM SERPL-SCNC: 138 MMOL/L (ref 136–145)
WBC # BLD AUTO: 7.16 THOUSAND/UL (ref 4.31–10.16)

## 2020-10-10 PROCEDURE — 97110 THERAPEUTIC EXERCISES: CPT

## 2020-10-10 PROCEDURE — 80048 BASIC METABOLIC PNL TOTAL CA: CPT | Performed by: STUDENT IN AN ORGANIZED HEALTH CARE EDUCATION/TRAINING PROGRAM

## 2020-10-10 PROCEDURE — 85027 COMPLETE CBC AUTOMATED: CPT | Performed by: STUDENT IN AN ORGANIZED HEALTH CARE EDUCATION/TRAINING PROGRAM

## 2020-10-10 PROCEDURE — 82948 REAGENT STRIP/BLOOD GLUCOSE: CPT

## 2020-10-10 PROCEDURE — 97166 OT EVAL MOD COMPLEX 45 MIN: CPT

## 2020-10-10 PROCEDURE — 99232 SBSQ HOSP IP/OBS MODERATE 35: CPT | Performed by: PHYSICIAN ASSISTANT

## 2020-10-10 PROCEDURE — 97116 GAIT TRAINING THERAPY: CPT

## 2020-10-10 RX ORDER — LANOLIN ALCOHOL/MO/W.PET/CERES
3 CREAM (GRAM) TOPICAL
Status: DISCONTINUED | OUTPATIENT
Start: 2020-10-10 | End: 2020-10-11 | Stop reason: HOSPADM

## 2020-10-10 RX ADMIN — METOPROLOL SUCCINATE 100 MG: 50 TABLET, EXTENDED RELEASE ORAL at 09:31

## 2020-10-10 RX ADMIN — TAMSULOSIN HYDROCHLORIDE 0.4 MG: 0.4 CAPSULE ORAL at 16:30

## 2020-10-10 RX ADMIN — INSULIN GLARGINE 30 UNITS: 100 INJECTION, SOLUTION SUBCUTANEOUS at 21:48

## 2020-10-10 RX ADMIN — OXYBUTYNIN 10 MG: 10 TABLET, FILM COATED, EXTENDED RELEASE ORAL at 09:31

## 2020-10-10 RX ADMIN — CEFAZOLIN SODIUM 2000 MG: 2 SOLUTION INTRAVENOUS at 06:08

## 2020-10-10 RX ADMIN — HEPARIN SODIUM 5000 UNITS: 5000 INJECTION INTRAVENOUS; SUBCUTANEOUS at 21:49

## 2020-10-10 RX ADMIN — MELATONIN 3 MG: 3 TAB ORAL at 21:46

## 2020-10-10 RX ADMIN — GABAPENTIN 100 MG: 100 CAPSULE ORAL at 16:30

## 2020-10-10 RX ADMIN — HEPARIN SODIUM 5000 UNITS: 5000 INJECTION INTRAVENOUS; SUBCUTANEOUS at 13:25

## 2020-10-10 RX ADMIN — ISOSORBIDE MONONITRATE 90 MG: 60 TABLET, EXTENDED RELEASE ORAL at 09:30

## 2020-10-10 RX ADMIN — SENNOSIDES 8.6 MG: 8.6 TABLET, FILM COATED ORAL at 09:31

## 2020-10-10 RX ADMIN — ASPIRIN 81 MG CHEWABLE TABLET 81 MG: 81 TABLET CHEWABLE at 09:31

## 2020-10-10 RX ADMIN — HEPARIN SODIUM 5000 UNITS: 5000 INJECTION INTRAVENOUS; SUBCUTANEOUS at 06:04

## 2020-10-10 RX ADMIN — BIMATOPROST 1 DROP: 0.1 SOLUTION/ DROPS OPHTHALMIC at 21:48

## 2020-10-10 RX ADMIN — Medication 1000 UNITS: at 09:31

## 2020-10-10 RX ADMIN — GABAPENTIN 100 MG: 100 CAPSULE ORAL at 09:31

## 2020-10-10 RX ADMIN — PANTOPRAZOLE SODIUM 40 MG: 40 TABLET, DELAYED RELEASE ORAL at 06:04

## 2020-10-10 RX ADMIN — INSULIN LISPRO 2 UNITS: 100 INJECTION, SOLUTION INTRAVENOUS; SUBCUTANEOUS at 21:48

## 2020-10-10 RX ADMIN — SERTRALINE HYDROCHLORIDE 50 MG: 50 TABLET, FILM COATED ORAL at 09:31

## 2020-10-10 RX ADMIN — GABAPENTIN 100 MG: 100 CAPSULE ORAL at 21:46

## 2020-10-11 VITALS
HEART RATE: 61 BPM | SYSTOLIC BLOOD PRESSURE: 146 MMHG | OXYGEN SATURATION: 97 % | WEIGHT: 239.86 LBS | DIASTOLIC BLOOD PRESSURE: 81 MMHG | BODY MASS INDEX: 29.21 KG/M2 | RESPIRATION RATE: 16 BRPM | TEMPERATURE: 97.4 F | HEIGHT: 76 IN

## 2020-10-11 LAB
ANION GAP SERPL CALCULATED.3IONS-SCNC: 11 MMOL/L (ref 4–13)
BACTERIA WND AEROBE CULT: ABNORMAL
BUN SERPL-MCNC: 30 MG/DL (ref 5–25)
CALCIUM SERPL-MCNC: 8.9 MG/DL (ref 8.3–10.1)
CHLORIDE SERPL-SCNC: 105 MMOL/L (ref 100–108)
CO2 SERPL-SCNC: 24 MMOL/L (ref 21–32)
CREAT SERPL-MCNC: 2.35 MG/DL (ref 0.6–1.3)
ERYTHROCYTE [DISTWIDTH] IN BLOOD BY AUTOMATED COUNT: 12.4 % (ref 11.6–15.1)
GFR SERPL CREATININE-BSD FRML MDRD: 30 ML/MIN/1.73SQ M
GLUCOSE SERPL-MCNC: 101 MG/DL (ref 65–140)
GLUCOSE SERPL-MCNC: 79 MG/DL (ref 65–140)
GLUCOSE SERPL-MCNC: 82 MG/DL (ref 65–140)
GRAM STN SPEC: ABNORMAL
HCT VFR BLD AUTO: 33.9 % (ref 36.5–49.3)
HGB BLD-MCNC: 11 G/DL (ref 12–17)
MCH RBC QN AUTO: 30.6 PG (ref 26.8–34.3)
MCHC RBC AUTO-ENTMCNC: 32.4 G/DL (ref 31.4–37.4)
MCV RBC AUTO: 94 FL (ref 82–98)
PLATELET # BLD AUTO: 155 THOUSANDS/UL (ref 149–390)
PMV BLD AUTO: 11.1 FL (ref 8.9–12.7)
POTASSIUM SERPL-SCNC: 4.4 MMOL/L (ref 3.5–5.3)
RBC # BLD AUTO: 3.6 MILLION/UL (ref 3.88–5.62)
SODIUM SERPL-SCNC: 140 MMOL/L (ref 136–145)
WBC # BLD AUTO: 8.21 THOUSAND/UL (ref 4.31–10.16)

## 2020-10-11 PROCEDURE — 99232 SBSQ HOSP IP/OBS MODERATE 35: CPT | Performed by: PHYSICIAN ASSISTANT

## 2020-10-11 PROCEDURE — 80048 BASIC METABOLIC PNL TOTAL CA: CPT | Performed by: STUDENT IN AN ORGANIZED HEALTH CARE EDUCATION/TRAINING PROGRAM

## 2020-10-11 PROCEDURE — 85027 COMPLETE CBC AUTOMATED: CPT | Performed by: STUDENT IN AN ORGANIZED HEALTH CARE EDUCATION/TRAINING PROGRAM

## 2020-10-11 PROCEDURE — 82948 REAGENT STRIP/BLOOD GLUCOSE: CPT

## 2020-10-11 RX ORDER — CIPROFLOXACIN 500 MG/1
500 TABLET, FILM COATED ORAL EVERY 12 HOURS SCHEDULED
Qty: 10 TABLET | Refills: 0 | Status: SHIPPED | OUTPATIENT
Start: 2020-10-11 | End: 2020-10-16

## 2020-10-11 RX ORDER — LINEZOLID 600 MG/1
600 TABLET, FILM COATED ORAL EVERY 12 HOURS SCHEDULED
Qty: 10 TABLET | Refills: 0 | Status: SHIPPED | OUTPATIENT
Start: 2020-10-11 | End: 2020-10-11 | Stop reason: HOSPADM

## 2020-10-11 RX ORDER — INSULIN GLARGINE 100 [IU]/ML
25 INJECTION, SOLUTION SUBCUTANEOUS
Status: DISCONTINUED | OUTPATIENT
Start: 2020-10-11 | End: 2020-10-11 | Stop reason: HOSPADM

## 2020-10-11 RX ORDER — CEPHALEXIN 500 MG/1
500 CAPSULE ORAL EVERY 12 HOURS SCHEDULED
Qty: 10 CAPSULE | Refills: 0 | Status: SHIPPED | OUTPATIENT
Start: 2020-10-11 | End: 2020-10-16

## 2020-10-11 RX ADMIN — METOPROLOL SUCCINATE 100 MG: 50 TABLET, EXTENDED RELEASE ORAL at 08:26

## 2020-10-11 RX ADMIN — ASPIRIN 81 MG CHEWABLE TABLET 81 MG: 81 TABLET CHEWABLE at 08:25

## 2020-10-11 RX ADMIN — HEPARIN SODIUM 5000 UNITS: 5000 INJECTION INTRAVENOUS; SUBCUTANEOUS at 06:31

## 2020-10-11 RX ADMIN — Medication 1000 UNITS: at 08:25

## 2020-10-11 RX ADMIN — PANTOPRAZOLE SODIUM 40 MG: 40 TABLET, DELAYED RELEASE ORAL at 06:59

## 2020-10-11 RX ADMIN — FLUTICASONE PROPIONATE 1 SPRAY: 50 SPRAY, METERED NASAL at 07:01

## 2020-10-11 RX ADMIN — ISOSORBIDE MONONITRATE 90 MG: 60 TABLET, EXTENDED RELEASE ORAL at 08:25

## 2020-10-11 RX ADMIN — GABAPENTIN 100 MG: 100 CAPSULE ORAL at 08:25

## 2020-10-11 RX ADMIN — OXYBUTYNIN 10 MG: 10 TABLET, FILM COATED, EXTENDED RELEASE ORAL at 08:26

## 2020-10-11 RX ADMIN — SERTRALINE HYDROCHLORIDE 50 MG: 50 TABLET, FILM COATED ORAL at 08:26

## 2020-10-11 RX ADMIN — SENNOSIDES 8.6 MG: 8.6 TABLET, FILM COATED ORAL at 08:25

## 2020-10-11 RX ADMIN — CEFAZOLIN SODIUM 2000 MG: 2 SOLUTION INTRAVENOUS at 06:32

## 2020-10-12 ENCOUNTER — TELEPHONE (OUTPATIENT)
Dept: UROLOGY | Facility: MEDICAL CENTER | Age: 75
End: 2020-10-12

## 2020-10-12 ENCOUNTER — TRANSITIONAL CARE MANAGEMENT (OUTPATIENT)
Dept: INTERNAL MEDICINE CLINIC | Facility: CLINIC | Age: 75
End: 2020-10-12

## 2020-10-12 DIAGNOSIS — Z71.89 COMPLEX CARE COORDINATION: Primary | ICD-10-CM

## 2020-10-12 DIAGNOSIS — C67.9 MALIGNANT NEOPLASM OF URINARY BLADDER, UNSPECIFIED SITE (HCC): Primary | ICD-10-CM

## 2020-10-12 DIAGNOSIS — R39.89 SUSPECTED UTI: ICD-10-CM

## 2020-10-13 ENCOUNTER — PATIENT OUTREACH (OUTPATIENT)
Dept: INTERNAL MEDICINE CLINIC | Facility: CLINIC | Age: 75
End: 2020-10-13

## 2020-10-14 ENCOUNTER — TELEPHONE (OUTPATIENT)
Dept: INTERNAL MEDICINE CLINIC | Facility: CLINIC | Age: 75
End: 2020-10-14

## 2020-10-14 DIAGNOSIS — N18.30 STAGE 3 CHRONIC KIDNEY DISEASE, UNSPECIFIED WHETHER STAGE 3A OR 3B CKD (HCC): ICD-10-CM

## 2020-10-14 DIAGNOSIS — E11.40 TYPE 2 DIABETES MELLITUS WITH DIABETIC NEUROPATHY, WITH LONG-TERM CURRENT USE OF INSULIN (HCC): ICD-10-CM

## 2020-10-14 DIAGNOSIS — Z79.4 TYPE 2 DIABETES MELLITUS WITH DIABETIC NEUROPATHY, WITH LONG-TERM CURRENT USE OF INSULIN (HCC): ICD-10-CM

## 2020-10-14 DIAGNOSIS — R19.7 DIARRHEA, UNSPECIFIED TYPE: Primary | ICD-10-CM

## 2020-10-16 ENCOUNTER — APPOINTMENT (OUTPATIENT)
Dept: LAB | Facility: CLINIC | Age: 75
End: 2020-10-16
Payer: MEDICARE

## 2020-10-16 DIAGNOSIS — Z01.812 PRE-OPERATIVE LABORATORY EXAMINATION: ICD-10-CM

## 2020-10-16 DIAGNOSIS — E11.40 TYPE 2 DIABETES MELLITUS WITH DIABETIC NEUROPATHY, WITH LONG-TERM CURRENT USE OF INSULIN (HCC): ICD-10-CM

## 2020-10-16 DIAGNOSIS — C67.9 MALIGNANT NEOPLASM OF URINARY BLADDER, UNSPECIFIED SITE (HCC): ICD-10-CM

## 2020-10-16 DIAGNOSIS — E11.8 TYPE 2 DIABETES MELLITUS WITH COMPLICATION (HCC): ICD-10-CM

## 2020-10-16 DIAGNOSIS — R19.7 DIARRHEA, UNSPECIFIED TYPE: ICD-10-CM

## 2020-10-16 DIAGNOSIS — R39.89 SUSPECTED UTI: ICD-10-CM

## 2020-10-16 DIAGNOSIS — N40.1 BENIGN PROSTATIC HYPERPLASIA WITH WEAK URINARY STREAM: ICD-10-CM

## 2020-10-16 DIAGNOSIS — E78.5 HYPERLIPIDEMIA, UNSPECIFIED HYPERLIPIDEMIA TYPE: ICD-10-CM

## 2020-10-16 DIAGNOSIS — N30.00 ACUTE CYSTITIS WITHOUT HEMATURIA: ICD-10-CM

## 2020-10-16 DIAGNOSIS — N18.30 STAGE 3 CHRONIC KIDNEY DISEASE, UNSPECIFIED WHETHER STAGE 3A OR 3B CKD (HCC): ICD-10-CM

## 2020-10-16 DIAGNOSIS — R39.12 BENIGN PROSTATIC HYPERPLASIA WITH WEAK URINARY STREAM: ICD-10-CM

## 2020-10-16 DIAGNOSIS — Z79.4 TYPE 2 DIABETES MELLITUS WITH DIABETIC NEUROPATHY, WITH LONG-TERM CURRENT USE OF INSULIN (HCC): ICD-10-CM

## 2020-10-16 LAB
ALBUMIN SERPL BCP-MCNC: 3.8 G/DL (ref 3.5–5)
ALP SERPL-CCNC: 104 U/L (ref 46–116)
ALT SERPL W P-5'-P-CCNC: 35 U/L (ref 12–78)
ANION GAP SERPL CALCULATED.3IONS-SCNC: 5 MMOL/L (ref 4–13)
AST SERPL W P-5'-P-CCNC: 29 U/L (ref 5–45)
BASOPHILS # BLD AUTO: 0.03 THOUSANDS/ΜL (ref 0–0.1)
BASOPHILS NFR BLD AUTO: 0 % (ref 0–1)
BILIRUB SERPL-MCNC: 0.34 MG/DL (ref 0.2–1)
BUN SERPL-MCNC: 40 MG/DL (ref 5–25)
CALCIUM SERPL-MCNC: 9.3 MG/DL (ref 8.3–10.1)
CHLORIDE SERPL-SCNC: 111 MMOL/L (ref 100–108)
CHOLEST SERPL-MCNC: 140 MG/DL (ref 50–200)
CO2 SERPL-SCNC: 27 MMOL/L (ref 21–32)
CREAT SERPL-MCNC: 2.79 MG/DL (ref 0.6–1.3)
EOSINOPHIL # BLD AUTO: 0.18 THOUSAND/ΜL (ref 0–0.61)
EOSINOPHIL NFR BLD AUTO: 2 % (ref 0–6)
ERYTHROCYTE [DISTWIDTH] IN BLOOD BY AUTOMATED COUNT: 12.9 % (ref 11.6–15.1)
EST. AVERAGE GLUCOSE BLD GHB EST-MCNC: 169 MG/DL
GFR SERPL CREATININE-BSD FRML MDRD: 24 ML/MIN/1.73SQ M
GLUCOSE P FAST SERPL-MCNC: 107 MG/DL (ref 65–99)
HBA1C MFR BLD: 7.5 %
HCT VFR BLD AUTO: 34.3 % (ref 36.5–49.3)
HDLC SERPL-MCNC: 43 MG/DL
HGB BLD-MCNC: 11.1 G/DL (ref 12–17)
IMM GRANULOCYTES # BLD AUTO: 0.03 THOUSAND/UL (ref 0–0.2)
IMM GRANULOCYTES NFR BLD AUTO: 0 % (ref 0–2)
LDLC SERPL CALC-MCNC: 66 MG/DL (ref 0–100)
LYMPHOCYTES # BLD AUTO: 2.14 THOUSANDS/ΜL (ref 0.6–4.47)
LYMPHOCYTES NFR BLD AUTO: 26 % (ref 14–44)
M AVIUM CMPLX RRNA SPEC QL PROBE: NEGATIVE
M GORDONAE RRNA SPEC QL PROBE: ABNORMAL
M KANSASII RRNA SPEC QL PROBE: ABNORMAL
M TB CMPLX RRNA SPEC QL PROBE: POSITIVE
MCH RBC QN AUTO: 30.7 PG (ref 26.8–34.3)
MCHC RBC AUTO-ENTMCNC: 32.4 G/DL (ref 31.4–37.4)
MCV RBC AUTO: 95 FL (ref 82–98)
MONOCYTES # BLD AUTO: 0.61 THOUSAND/ΜL (ref 0.17–1.22)
MONOCYTES NFR BLD AUTO: 8 % (ref 4–12)
NEUTROPHILS # BLD AUTO: 5.15 THOUSANDS/ΜL (ref 1.85–7.62)
NEUTS SEG NFR BLD AUTO: 64 % (ref 43–75)
NRBC BLD AUTO-RTO: 0 /100 WBCS
OTHER: ABNORMAL
PLATELET # BLD AUTO: 191 THOUSANDS/UL (ref 149–390)
PMV BLD AUTO: 11.2 FL (ref 8.9–12.7)
POTASSIUM SERPL-SCNC: 4.7 MMOL/L (ref 3.5–5.3)
PROT SERPL-MCNC: 7.9 G/DL (ref 6.4–8.2)
RBC # BLD AUTO: 3.61 MILLION/UL (ref 3.88–5.62)
SODIUM SERPL-SCNC: 143 MMOL/L (ref 136–145)
TRIGL SERPL-MCNC: 157 MG/DL
TSH SERPL DL<=0.05 MIU/L-ACNC: 1.97 UIU/ML (ref 0.36–3.74)
WBC # BLD AUTO: 8.14 THOUSAND/UL (ref 4.31–10.16)

## 2020-10-16 PROCEDURE — 36415 COLL VENOUS BLD VENIPUNCTURE: CPT

## 2020-10-16 PROCEDURE — 80061 LIPID PANEL: CPT

## 2020-10-16 PROCEDURE — 84443 ASSAY THYROID STIM HORMONE: CPT

## 2020-10-16 PROCEDURE — 80053 COMPREHEN METABOLIC PANEL: CPT

## 2020-10-16 PROCEDURE — 83036 HEMOGLOBIN GLYCOSYLATED A1C: CPT

## 2020-10-16 PROCEDURE — 85025 COMPLETE CBC W/AUTO DIFF WBC: CPT

## 2020-10-21 ENCOUNTER — TELEPHONE (OUTPATIENT)
Dept: INFECTIOUS DISEASES | Facility: CLINIC | Age: 75
End: 2020-10-21

## 2020-10-21 ENCOUNTER — OFFICE VISIT (OUTPATIENT)
Dept: INTERNAL MEDICINE CLINIC | Facility: CLINIC | Age: 75
End: 2020-10-21
Payer: MEDICARE

## 2020-10-21 VITALS
RESPIRATION RATE: 12 BRPM | SYSTOLIC BLOOD PRESSURE: 140 MMHG | TEMPERATURE: 97.8 F | WEIGHT: 240 LBS | HEIGHT: 76 IN | DIASTOLIC BLOOD PRESSURE: 84 MMHG | HEART RATE: 80 BPM | BODY MASS INDEX: 29.22 KG/M2

## 2020-10-21 DIAGNOSIS — I25.10 CORONARY ARTERY DISEASE INVOLVING NATIVE CORONARY ARTERY OF NATIVE HEART WITHOUT ANGINA PECTORIS: ICD-10-CM

## 2020-10-21 DIAGNOSIS — E11.621 DIABETIC ULCER OF LEFT MIDFOOT ASSOCIATED WITH TYPE 2 DIABETES MELLITUS, WITH BONE INVOLVEMENT WITHOUT EVIDENCE OF NECROSIS (HCC): ICD-10-CM

## 2020-10-21 DIAGNOSIS — J42 CHRONIC BRONCHITIS, UNSPECIFIED CHRONIC BRONCHITIS TYPE (HCC): ICD-10-CM

## 2020-10-21 DIAGNOSIS — C67.9 MALIGNANT NEOPLASM OF URINARY BLADDER, UNSPECIFIED SITE (HCC): ICD-10-CM

## 2020-10-21 DIAGNOSIS — Z79.4 TYPE 2 DIABETES MELLITUS WITH DIABETIC NEUROPATHY, WITH LONG-TERM CURRENT USE OF INSULIN (HCC): Primary | ICD-10-CM

## 2020-10-21 DIAGNOSIS — L97.426 DIABETIC ULCER OF LEFT MIDFOOT ASSOCIATED WITH TYPE 2 DIABETES MELLITUS, WITH BONE INVOLVEMENT WITHOUT EVIDENCE OF NECROSIS (HCC): ICD-10-CM

## 2020-10-21 DIAGNOSIS — E78.5 HYPERLIPIDEMIA, UNSPECIFIED HYPERLIPIDEMIA TYPE: ICD-10-CM

## 2020-10-21 DIAGNOSIS — I10 ESSENTIAL HYPERTENSION: ICD-10-CM

## 2020-10-21 DIAGNOSIS — N18.4 CKD (CHRONIC KIDNEY DISEASE) STAGE 4, GFR 15-29 ML/MIN (HCC): ICD-10-CM

## 2020-10-21 DIAGNOSIS — E11.40 TYPE 2 DIABETES MELLITUS WITH DIABETIC NEUROPATHY, WITH LONG-TERM CURRENT USE OF INSULIN (HCC): Primary | ICD-10-CM

## 2020-10-21 DIAGNOSIS — R19.7 DIARRHEA, UNSPECIFIED TYPE: ICD-10-CM

## 2020-10-21 DIAGNOSIS — E11.42 DIABETIC POLYNEUROPATHY ASSOCIATED WITH TYPE 2 DIABETES MELLITUS (HCC): ICD-10-CM

## 2020-10-21 PROCEDURE — 99495 TRANSJ CARE MGMT MOD F2F 14D: CPT | Performed by: INTERNAL MEDICINE

## 2020-10-22 ENCOUNTER — PATIENT OUTREACH (OUTPATIENT)
Dept: INTERNAL MEDICINE CLINIC | Facility: CLINIC | Age: 75
End: 2020-10-22

## 2020-10-22 ENCOUNTER — OFFICE VISIT (OUTPATIENT)
Dept: WOUND CARE | Facility: HOSPITAL | Age: 75
End: 2020-10-22
Payer: MEDICARE

## 2020-10-22 VITALS
HEART RATE: 82 BPM | RESPIRATION RATE: 18 BRPM | SYSTOLIC BLOOD PRESSURE: 138 MMHG | DIASTOLIC BLOOD PRESSURE: 80 MMHG | TEMPERATURE: 97.9 F

## 2020-10-22 DIAGNOSIS — E11.49 DIABETIC NEUROPATHY WITH NEUROLOGIC COMPLICATION (HCC): ICD-10-CM

## 2020-10-22 DIAGNOSIS — L97.422 DIABETIC ULCER OF LEFT MIDFOOT ASSOCIATED WITH TYPE 2 DIABETES MELLITUS, WITH FAT LAYER EXPOSED (HCC): Primary | ICD-10-CM

## 2020-10-22 DIAGNOSIS — E11.40 DIABETIC NEUROPATHY WITH NEUROLOGIC COMPLICATION (HCC): ICD-10-CM

## 2020-10-22 DIAGNOSIS — E11.621 DIABETIC ULCER OF LEFT MIDFOOT ASSOCIATED WITH TYPE 2 DIABETES MELLITUS, WITH FAT LAYER EXPOSED (HCC): Primary | ICD-10-CM

## 2020-10-22 PROCEDURE — 99213 OFFICE O/P EST LOW 20 MIN: CPT | Performed by: PODIATRIST

## 2020-10-22 PROCEDURE — 11042 DBRDMT SUBQ TIS 1ST 20SQCM/<: CPT | Performed by: PODIATRIST

## 2020-10-27 LAB
LEFT EYE DIABETIC RETINOPATHY: NORMAL
RIGHT EYE DIABETIC RETINOPATHY: NORMAL
SEVERITY (EYE EXAM): NORMAL

## 2020-10-28 DIAGNOSIS — E55.9 VITAMIN D DEFICIENCY: ICD-10-CM

## 2020-10-28 RX ORDER — CHOLECALCIFEROL (VITAMIN D3) 1250 MCG
CAPSULE ORAL
Qty: 12 CAPSULE | Refills: 0 | Status: SHIPPED | OUTPATIENT
Start: 2020-10-28 | End: 2021-05-07

## 2020-10-29 ENCOUNTER — OFFICE VISIT (OUTPATIENT)
Dept: ENDOCRINOLOGY | Facility: HOSPITAL | Age: 75
End: 2020-10-29
Payer: MEDICARE

## 2020-10-29 VITALS
HEART RATE: 72 BPM | BODY MASS INDEX: 29.21 KG/M2 | DIASTOLIC BLOOD PRESSURE: 60 MMHG | SYSTOLIC BLOOD PRESSURE: 100 MMHG | TEMPERATURE: 97.6 F | HEIGHT: 76 IN

## 2020-10-29 DIAGNOSIS — E11.40 TYPE 2 DIABETES MELLITUS WITH DIABETIC NEUROPATHY, WITH LONG-TERM CURRENT USE OF INSULIN (HCC): Primary | ICD-10-CM

## 2020-10-29 DIAGNOSIS — E78.5 HYPERLIPIDEMIA, UNSPECIFIED HYPERLIPIDEMIA TYPE: ICD-10-CM

## 2020-10-29 DIAGNOSIS — Z79.4 TYPE 2 DIABETES MELLITUS WITH COMPLICATION, WITH LONG-TERM CURRENT USE OF INSULIN (HCC): ICD-10-CM

## 2020-10-29 DIAGNOSIS — Z79.4 TYPE 2 DIABETES MELLITUS WITH DIABETIC NEUROPATHY, WITH LONG-TERM CURRENT USE OF INSULIN (HCC): Primary | ICD-10-CM

## 2020-10-29 DIAGNOSIS — I10 ESSENTIAL HYPERTENSION: ICD-10-CM

## 2020-10-29 DIAGNOSIS — E11.8 TYPE 2 DIABETES MELLITUS WITH COMPLICATION, WITH LONG-TERM CURRENT USE OF INSULIN (HCC): ICD-10-CM

## 2020-10-29 PROCEDURE — 99214 OFFICE O/P EST MOD 30 MIN: CPT | Performed by: INTERNAL MEDICINE

## 2020-10-29 RX ORDER — INSULIN GLARGINE 100 [IU]/ML
INJECTION, SOLUTION SUBCUTANEOUS
Qty: 10 PEN | Refills: 1
Start: 2020-10-29 | End: 2021-02-05 | Stop reason: SDUPTHER

## 2020-11-13 LAB
MYCOBACTERIUM SPEC CULT: ABNORMAL
MYCOBACTERIUM SPEC CULT: ABNORMAL
RHODAMINE-AURAMINE STN SPEC: ABNORMAL

## 2020-11-16 ENCOUNTER — LAB (OUTPATIENT)
Dept: LAB | Facility: CLINIC | Age: 75
End: 2020-11-16
Payer: MEDICARE

## 2020-11-16 ENCOUNTER — ANESTHESIA EVENT (OUTPATIENT)
Dept: PERIOP | Facility: HOSPITAL | Age: 75
End: 2020-11-16
Payer: MEDICARE

## 2020-11-16 DIAGNOSIS — C67.9 MALIGNANT NEOPLASM OF URINARY BLADDER, UNSPECIFIED SITE (HCC): ICD-10-CM

## 2020-11-16 DIAGNOSIS — C67.9 MALIGNANT NEOPLASM OF URINARY BLADDER, UNSPECIFIED SITE (HCC): Primary | ICD-10-CM

## 2020-11-16 DIAGNOSIS — R39.89 SUSPECTED UTI: ICD-10-CM

## 2020-11-16 PROCEDURE — 87086 URINE CULTURE/COLONY COUNT: CPT

## 2020-11-17 LAB — BACTERIA UR CULT: NORMAL

## 2020-11-17 PROCEDURE — NC001 PR NO CHARGE: Performed by: UROLOGY

## 2020-11-19 ENCOUNTER — HOSPITAL ENCOUNTER (OUTPATIENT)
Facility: HOSPITAL | Age: 75
Setting detail: OUTPATIENT SURGERY
Discharge: HOME | End: 2020-11-19
Attending: UROLOGY | Admitting: UROLOGY
Payer: MEDICARE

## 2020-11-19 ENCOUNTER — ANESTHESIA (OUTPATIENT)
Dept: PERIOP | Facility: HOSPITAL | Age: 75
End: 2020-11-19
Payer: MEDICARE

## 2020-11-19 ENCOUNTER — TELEPHONE (OUTPATIENT)
Dept: UROLOGY | Facility: MEDICAL CENTER | Age: 75
End: 2020-11-19

## 2020-11-19 VITALS
RESPIRATION RATE: 16 BRPM | SYSTOLIC BLOOD PRESSURE: 115 MMHG | HEIGHT: 76 IN | DIASTOLIC BLOOD PRESSURE: 74 MMHG | BODY MASS INDEX: 29.22 KG/M2 | HEART RATE: 59 BPM | OXYGEN SATURATION: 100 % | TEMPERATURE: 97.4 F | WEIGHT: 240 LBS

## 2020-11-19 VITALS — HEART RATE: 56 BPM

## 2020-11-19 DIAGNOSIS — C67.9 MALIGNANT NEOPLASM OF URINARY BLADDER, UNSPECIFIED SITE (HCC): ICD-10-CM

## 2020-11-19 DIAGNOSIS — C67.8 MALIGNANT NEOPLASM OF OVERLAPPING SITES OF BLADDER (HCC): Primary | ICD-10-CM

## 2020-11-19 DIAGNOSIS — T50.A95A: ICD-10-CM

## 2020-11-19 DIAGNOSIS — N30.80: ICD-10-CM

## 2020-11-19 LAB
GLUCOSE SERPL-MCNC: 133 MG/DL (ref 65–140)
GLUCOSE SERPL-MCNC: 141 MG/DL (ref 65–140)

## 2020-11-19 PROCEDURE — 52204 CYSTOSCOPY W/BIOPSY(S): CPT | Performed by: UROLOGY

## 2020-11-19 PROCEDURE — NC001 PR NO CHARGE: Performed by: UROLOGY

## 2020-11-19 PROCEDURE — 88307 TISSUE EXAM BY PATHOLOGIST: CPT | Performed by: PATHOLOGY

## 2020-11-19 PROCEDURE — 88305 TISSUE EXAM BY PATHOLOGIST: CPT | Performed by: PATHOLOGY

## 2020-11-19 PROCEDURE — 87116 MYCOBACTERIA CULTURE: CPT | Performed by: UROLOGY

## 2020-11-19 PROCEDURE — 82948 REAGENT STRIP/BLOOD GLUCOSE: CPT

## 2020-11-19 PROCEDURE — 52601 PROSTATECTOMY (TURP): CPT | Performed by: UROLOGY

## 2020-11-19 PROCEDURE — 88312 SPECIAL STAINS GROUP 1: CPT | Performed by: PATHOLOGY

## 2020-11-19 PROCEDURE — 87206 SMEAR FLUORESCENT/ACID STAI: CPT | Performed by: UROLOGY

## 2020-11-19 RX ORDER — OXYCODONE HYDROCHLORIDE 5 MG/1
5 TABLET ORAL EVERY 4 HOURS PRN
Status: DISCONTINUED | OUTPATIENT
Start: 2020-11-19 | End: 2020-11-19 | Stop reason: HOSPADM

## 2020-11-19 RX ORDER — SODIUM CHLORIDE, SODIUM LACTATE, POTASSIUM CHLORIDE, CALCIUM CHLORIDE 600; 310; 30; 20 MG/100ML; MG/100ML; MG/100ML; MG/100ML
50 INJECTION, SOLUTION INTRAVENOUS CONTINUOUS
Status: DISCONTINUED | OUTPATIENT
Start: 2020-11-19 | End: 2020-11-19 | Stop reason: HOSPADM

## 2020-11-19 RX ORDER — HYDROMORPHONE HCL/PF 1 MG/ML
0.5 SYRINGE (ML) INJECTION
Status: DISCONTINUED | OUTPATIENT
Start: 2020-11-19 | End: 2020-11-19 | Stop reason: HOSPADM

## 2020-11-19 RX ORDER — FENTANYL CITRATE/PF 50 MCG/ML
50 SYRINGE (ML) INJECTION
Status: DISCONTINUED | OUTPATIENT
Start: 2020-11-19 | End: 2020-11-19 | Stop reason: HOSPADM

## 2020-11-19 RX ORDER — ACETAMINOPHEN 325 MG/1
650 TABLET ORAL EVERY 6 HOURS PRN
Status: DISCONTINUED | OUTPATIENT
Start: 2020-11-19 | End: 2020-11-19 | Stop reason: HOSPADM

## 2020-11-19 RX ORDER — CEFAZOLIN SODIUM 2 G/50ML
2000 SOLUTION INTRAVENOUS ONCE
Status: COMPLETED | OUTPATIENT
Start: 2020-11-19 | End: 2020-11-19

## 2020-11-19 RX ORDER — LATANOPROST 50 UG/ML
1 SOLUTION/ DROPS OPHTHALMIC
COMMUNITY
End: 2021-10-12

## 2020-11-19 RX ORDER — MAGNESIUM HYDROXIDE 1200 MG/15ML
LIQUID ORAL AS NEEDED
Status: DISCONTINUED | OUTPATIENT
Start: 2020-11-19 | End: 2020-11-19 | Stop reason: HOSPADM

## 2020-11-19 RX ORDER — LIDOCAINE HYDROCHLORIDE 10 MG/ML
0.5 INJECTION, SOLUTION EPIDURAL; INFILTRATION; INTRACAUDAL; PERINEURAL ONCE AS NEEDED
Status: DISCONTINUED | OUTPATIENT
Start: 2020-11-19 | End: 2020-11-19 | Stop reason: HOSPADM

## 2020-11-19 RX ORDER — MEPERIDINE HYDROCHLORIDE 25 MG/ML
12.5 INJECTION INTRAMUSCULAR; INTRAVENOUS; SUBCUTANEOUS ONCE AS NEEDED
Status: DISCONTINUED | OUTPATIENT
Start: 2020-11-19 | End: 2020-11-19 | Stop reason: HOSPADM

## 2020-11-19 RX ORDER — PROPOFOL 10 MG/ML
INJECTION, EMULSION INTRAVENOUS AS NEEDED
Status: DISCONTINUED | OUTPATIENT
Start: 2020-11-19 | End: 2020-11-19

## 2020-11-19 RX ORDER — ONDANSETRON 2 MG/ML
INJECTION INTRAMUSCULAR; INTRAVENOUS AS NEEDED
Status: DISCONTINUED | OUTPATIENT
Start: 2020-11-19 | End: 2020-11-19

## 2020-11-19 RX ORDER — MIDAZOLAM HYDROCHLORIDE 2 MG/2ML
INJECTION, SOLUTION INTRAMUSCULAR; INTRAVENOUS AS NEEDED
Status: DISCONTINUED | OUTPATIENT
Start: 2020-11-19 | End: 2020-11-19

## 2020-11-19 RX ORDER — ONDANSETRON 2 MG/ML
4 INJECTION INTRAMUSCULAR; INTRAVENOUS EVERY 6 HOURS PRN
Status: DISCONTINUED | OUTPATIENT
Start: 2020-11-19 | End: 2020-11-19 | Stop reason: HOSPADM

## 2020-11-19 RX ORDER — ONDANSETRON 2 MG/ML
4 INJECTION INTRAMUSCULAR; INTRAVENOUS ONCE AS NEEDED
Status: DISCONTINUED | OUTPATIENT
Start: 2020-11-19 | End: 2020-11-19 | Stop reason: HOSPADM

## 2020-11-19 RX ORDER — OXYCODONE HYDROCHLORIDE 5 MG/1
TABLET ORAL
Qty: 10 TABLET | Refills: 0 | Status: SHIPPED | OUTPATIENT
Start: 2020-11-19 | End: 2021-01-06 | Stop reason: HOSPADM

## 2020-11-19 RX ORDER — FENTANYL CITRATE 50 UG/ML
INJECTION, SOLUTION INTRAMUSCULAR; INTRAVENOUS AS NEEDED
Status: DISCONTINUED | OUTPATIENT
Start: 2020-11-19 | End: 2020-11-19

## 2020-11-19 RX ADMIN — FENTANYL CITRATE 25 MCG: 50 INJECTION, SOLUTION INTRAMUSCULAR; INTRAVENOUS at 08:56

## 2020-11-19 RX ADMIN — MIDAZOLAM 2 MG: 1 INJECTION INTRAMUSCULAR; INTRAVENOUS at 08:30

## 2020-11-19 RX ADMIN — SODIUM CHLORIDE, SODIUM LACTATE, POTASSIUM CHLORIDE, AND CALCIUM CHLORIDE 50 ML/HR: .6; .31; .03; .02 INJECTION, SOLUTION INTRAVENOUS at 07:50

## 2020-11-19 RX ADMIN — ONDANSETRON 4 MG: 2 INJECTION INTRAMUSCULAR; INTRAVENOUS at 09:02

## 2020-11-19 RX ADMIN — OXYCODONE HYDROCHLORIDE 5 MG: 5 TABLET ORAL at 11:13

## 2020-11-19 RX ADMIN — CEFAZOLIN SODIUM 2000 MG: 2 SOLUTION INTRAVENOUS at 08:22

## 2020-11-19 RX ADMIN — FENTANYL CITRATE 25 MCG: 50 INJECTION, SOLUTION INTRAMUSCULAR; INTRAVENOUS at 08:30

## 2020-11-19 RX ADMIN — FENTANYL CITRATE 25 MCG: 50 INJECTION, SOLUTION INTRAMUSCULAR; INTRAVENOUS at 09:23

## 2020-11-19 RX ADMIN — FENTANYL CITRATE 25 MCG: 50 INJECTION, SOLUTION INTRAMUSCULAR; INTRAVENOUS at 08:37

## 2020-11-19 RX ADMIN — PROPOFOL 120 MG: 10 INJECTION, EMULSION INTRAVENOUS at 08:37

## 2020-11-23 ENCOUNTER — TELEPHONE (OUTPATIENT)
Dept: INTERNAL MEDICINE CLINIC | Facility: CLINIC | Age: 75
End: 2020-11-23

## 2020-11-23 ENCOUNTER — TELEPHONE (OUTPATIENT)
Dept: UROLOGY | Facility: MEDICAL CENTER | Age: 75
End: 2020-11-23

## 2020-11-23 ENCOUNTER — CLINICAL SUPPORT (OUTPATIENT)
Dept: UROLOGY | Facility: MEDICAL CENTER | Age: 75
End: 2020-11-23
Payer: MEDICARE

## 2020-11-23 VITALS
HEIGHT: 76 IN | DIASTOLIC BLOOD PRESSURE: 64 MMHG | HEART RATE: 76 BPM | WEIGHT: 238 LBS | BODY MASS INDEX: 28.98 KG/M2 | SYSTOLIC BLOOD PRESSURE: 148 MMHG

## 2020-11-23 DIAGNOSIS — D09.0 CARCINOMA IN SITU OF BLADDER: ICD-10-CM

## 2020-11-23 DIAGNOSIS — T50.A95A: ICD-10-CM

## 2020-11-23 DIAGNOSIS — N30.80: ICD-10-CM

## 2020-11-23 DIAGNOSIS — R39.12 BENIGN PROSTATIC HYPERPLASIA WITH WEAK URINARY STREAM: ICD-10-CM

## 2020-11-23 DIAGNOSIS — C67.9 MALIGNANT NEOPLASM OF URINARY BLADDER, UNSPECIFIED SITE (HCC): Primary | ICD-10-CM

## 2020-11-23 DIAGNOSIS — N40.1 BENIGN PROSTATIC HYPERPLASIA WITH WEAK URINARY STREAM: ICD-10-CM

## 2020-11-23 PROCEDURE — 99211 OFF/OP EST MAY X REQ PHY/QHP: CPT

## 2020-11-24 DIAGNOSIS — N30.90 BLADDER INFECTION: Primary | ICD-10-CM

## 2020-11-25 ENCOUNTER — TELEPHONE (OUTPATIENT)
Dept: INTERNAL MEDICINE CLINIC | Facility: CLINIC | Age: 75
End: 2020-11-25

## 2020-12-01 ENCOUNTER — TELEPHONE (OUTPATIENT)
Dept: INFECTIOUS DISEASES | Facility: CLINIC | Age: 75
End: 2020-12-01

## 2020-12-01 ENCOUNTER — APPOINTMENT (OUTPATIENT)
Dept: LAB | Facility: CLINIC | Age: 75
End: 2020-12-01
Payer: MEDICARE

## 2020-12-01 ENCOUNTER — TRANSCRIBE ORDERS (OUTPATIENT)
Dept: LAB | Facility: CLINIC | Age: 75
End: 2020-12-01

## 2020-12-01 DIAGNOSIS — I10 ESSENTIAL HYPERTENSION, BENIGN: ICD-10-CM

## 2020-12-01 DIAGNOSIS — Z79.4 TYPE 2 DIABETES MELLITUS WITH DIABETIC NEUROPATHY, WITH LONG-TERM CURRENT USE OF INSULIN (HCC): ICD-10-CM

## 2020-12-01 DIAGNOSIS — N30.90 BLADDER INFECTION: Primary | ICD-10-CM

## 2020-12-01 DIAGNOSIS — I10 ESSENTIAL HYPERTENSION: ICD-10-CM

## 2020-12-01 DIAGNOSIS — N18.4 CHRONIC KIDNEY DISEASE, STAGE IV (SEVERE) (HCC): ICD-10-CM

## 2020-12-01 DIAGNOSIS — N25.81 SECONDARY HYPERPARATHYROIDISM OF RENAL ORIGIN (HCC): ICD-10-CM

## 2020-12-01 DIAGNOSIS — L97.426 DIABETIC ULCER OF LEFT MIDFOOT ASSOCIATED WITH TYPE 2 DIABETES MELLITUS, WITH BONE INVOLVEMENT WITHOUT EVIDENCE OF NECROSIS (HCC): ICD-10-CM

## 2020-12-01 DIAGNOSIS — E11.42 DIABETIC POLYNEUROPATHY ASSOCIATED WITH TYPE 2 DIABETES MELLITUS (HCC): ICD-10-CM

## 2020-12-01 DIAGNOSIS — E11.40 TYPE 2 DIABETES MELLITUS WITH DIABETIC NEUROPATHY, WITH LONG-TERM CURRENT USE OF INSULIN (HCC): ICD-10-CM

## 2020-12-01 DIAGNOSIS — R80.9 PROTEINURIA, UNSPECIFIED TYPE: ICD-10-CM

## 2020-12-01 DIAGNOSIS — N18.4 CHRONIC KIDNEY DISEASE, STAGE IV (SEVERE) (HCC): Primary | ICD-10-CM

## 2020-12-01 DIAGNOSIS — E11.29 TYPE 2 DIABETES MELLITUS WITH OTHER KIDNEY COMPLICATION, UNSPECIFIED WHETHER LONG TERM INSULIN USE (HCC): ICD-10-CM

## 2020-12-01 DIAGNOSIS — E79.0 HYPERURICEMIA: ICD-10-CM

## 2020-12-01 DIAGNOSIS — E11.621 DIABETIC ULCER OF LEFT MIDFOOT ASSOCIATED WITH TYPE 2 DIABETES MELLITUS, WITH BONE INVOLVEMENT WITHOUT EVIDENCE OF NECROSIS (HCC): ICD-10-CM

## 2020-12-01 DIAGNOSIS — N30.90 BLADDER INFECTION: ICD-10-CM

## 2020-12-01 DIAGNOSIS — N18.4 CKD (CHRONIC KIDNEY DISEASE) STAGE 4, GFR 15-29 ML/MIN (HCC): ICD-10-CM

## 2020-12-01 DIAGNOSIS — R19.7 DIARRHEA, UNSPECIFIED TYPE: ICD-10-CM

## 2020-12-01 LAB
ALBUMIN SERPL BCP-MCNC: 3.9 G/DL (ref 3.5–5)
ALP SERPL-CCNC: 113 U/L (ref 46–116)
ALT SERPL W P-5'-P-CCNC: 47 U/L (ref 12–78)
ANION GAP SERPL CALCULATED.3IONS-SCNC: 4 MMOL/L (ref 4–13)
AST SERPL W P-5'-P-CCNC: 27 U/L (ref 5–45)
BASOPHILS # BLD AUTO: 0.05 THOUSANDS/ΜL (ref 0–0.1)
BASOPHILS NFR BLD AUTO: 1 % (ref 0–1)
BILIRUB SERPL-MCNC: 0.37 MG/DL (ref 0.2–1)
BUN SERPL-MCNC: 40 MG/DL (ref 5–25)
CALCIUM SERPL-MCNC: 9.1 MG/DL (ref 8.3–10.1)
CHLORIDE SERPL-SCNC: 108 MMOL/L (ref 100–108)
CO2 SERPL-SCNC: 27 MMOL/L (ref 21–32)
CREAT SERPL-MCNC: 2.5 MG/DL (ref 0.6–1.3)
CREAT UR-MCNC: 164 MG/DL
EOSINOPHIL # BLD AUTO: 0.31 THOUSAND/ΜL (ref 0–0.61)
EOSINOPHIL NFR BLD AUTO: 4 % (ref 0–6)
ERYTHROCYTE [DISTWIDTH] IN BLOOD BY AUTOMATED COUNT: 13.2 % (ref 11.6–15.1)
GFR SERPL CREATININE-BSD FRML MDRD: 28 ML/MIN/1.73SQ M
GLUCOSE P FAST SERPL-MCNC: 120 MG/DL (ref 65–99)
HCT VFR BLD AUTO: 36.1 % (ref 36.5–49.3)
HGB BLD-MCNC: 11.5 G/DL (ref 12–17)
IMM GRANULOCYTES # BLD AUTO: 0.03 THOUSAND/UL (ref 0–0.2)
IMM GRANULOCYTES NFR BLD AUTO: 0 % (ref 0–2)
LYMPHOCYTES # BLD AUTO: 2.08 THOUSANDS/ΜL (ref 0.6–4.47)
LYMPHOCYTES NFR BLD AUTO: 23 % (ref 14–44)
MAGNESIUM SERPL-MCNC: 2.3 MG/DL (ref 1.6–2.6)
MCH RBC QN AUTO: 29.8 PG (ref 26.8–34.3)
MCHC RBC AUTO-ENTMCNC: 31.9 G/DL (ref 31.4–37.4)
MCV RBC AUTO: 94 FL (ref 82–98)
MONOCYTES # BLD AUTO: 0.64 THOUSAND/ΜL (ref 0.17–1.22)
MONOCYTES NFR BLD AUTO: 7 % (ref 4–12)
NEUTROPHILS # BLD AUTO: 5.86 THOUSANDS/ΜL (ref 1.85–7.62)
NEUTS SEG NFR BLD AUTO: 65 % (ref 43–75)
NRBC BLD AUTO-RTO: 0 /100 WBCS
PHOSPHATE SERPL-MCNC: 3.2 MG/DL (ref 2.3–4.1)
PLATELET # BLD AUTO: 173 THOUSANDS/UL (ref 149–390)
PMV BLD AUTO: 11.6 FL (ref 8.9–12.7)
POTASSIUM SERPL-SCNC: 4.9 MMOL/L (ref 3.5–5.3)
PROT SERPL-MCNC: 7.5 G/DL (ref 6.4–8.2)
PROT UR-MCNC: 87 MG/DL
PROT/CREAT UR: 0.53 MG/G{CREAT} (ref 0–0.1)
PTH-INTACT SERPL-MCNC: 137.4 PG/ML (ref 18.4–80.1)
RBC # BLD AUTO: 3.86 MILLION/UL (ref 3.88–5.62)
SODIUM SERPL-SCNC: 139 MMOL/L (ref 136–145)
TSH SERPL DL<=0.05 MIU/L-ACNC: 1.65 UIU/ML (ref 0.36–3.74)
URATE SERPL-MCNC: 6.9 MG/DL (ref 4.2–8)
WBC # BLD AUTO: 8.97 THOUSAND/UL (ref 4.31–10.16)

## 2020-12-01 PROCEDURE — 36415 COLL VENOUS BLD VENIPUNCTURE: CPT

## 2020-12-01 PROCEDURE — 84156 ASSAY OF PROTEIN URINE: CPT

## 2020-12-01 PROCEDURE — 80053 COMPREHEN METABOLIC PANEL: CPT

## 2020-12-01 PROCEDURE — 84550 ASSAY OF BLOOD/URIC ACID: CPT

## 2020-12-01 PROCEDURE — 83970 ASSAY OF PARATHORMONE: CPT

## 2020-12-01 PROCEDURE — 82570 ASSAY OF URINE CREATININE: CPT

## 2020-12-01 PROCEDURE — 84100 ASSAY OF PHOSPHORUS: CPT

## 2020-12-01 PROCEDURE — 83735 ASSAY OF MAGNESIUM: CPT

## 2020-12-01 PROCEDURE — 85025 COMPLETE CBC W/AUTO DIFF WBC: CPT

## 2020-12-01 PROCEDURE — 84443 ASSAY THYROID STIM HORMONE: CPT

## 2020-12-02 ENCOUNTER — OFFICE VISIT (OUTPATIENT)
Dept: INFECTIOUS DISEASES | Facility: CLINIC | Age: 75
End: 2020-12-02
Payer: MEDICARE

## 2020-12-02 VITALS
BODY MASS INDEX: 29.22 KG/M2 | HEART RATE: 86 BPM | DIASTOLIC BLOOD PRESSURE: 75 MMHG | HEIGHT: 76 IN | SYSTOLIC BLOOD PRESSURE: 125 MMHG | RESPIRATION RATE: 17 BRPM | TEMPERATURE: 97 F | WEIGHT: 240 LBS

## 2020-12-02 DIAGNOSIS — R30.0 DYSURIA: Primary | ICD-10-CM

## 2020-12-02 DIAGNOSIS — N18.4 CKD (CHRONIC KIDNEY DISEASE) STAGE 4, GFR 15-29 ML/MIN (HCC): ICD-10-CM

## 2020-12-02 DIAGNOSIS — T50.A95A: ICD-10-CM

## 2020-12-02 DIAGNOSIS — D09.0 CARCINOMA IN SITU OF BLADDER: ICD-10-CM

## 2020-12-02 DIAGNOSIS — I25.10 CORONARY ARTERY DISEASE INVOLVING NATIVE CORONARY ARTERY OF NATIVE HEART WITHOUT ANGINA PECTORIS: ICD-10-CM

## 2020-12-02 DIAGNOSIS — N30.80: ICD-10-CM

## 2020-12-02 PROCEDURE — 99215 OFFICE O/P EST HI 40 MIN: CPT | Performed by: INTERNAL MEDICINE

## 2020-12-08 ENCOUNTER — HOSPITAL ENCOUNTER (OUTPATIENT)
Dept: RADIOLOGY | Facility: HOSPITAL | Age: 75
Discharge: HOME | End: 2020-12-08
Attending: PODIATRIST
Payer: MEDICARE

## 2020-12-08 ENCOUNTER — TRANSCRIBE ORDERS (OUTPATIENT)
Dept: ADMINISTRATIVE | Facility: HOSPITAL | Age: 75
End: 2020-12-08

## 2020-12-08 DIAGNOSIS — L97.522 ULCER OF LEFT FOOT, WITH FAT LAYER EXPOSED (HCC): ICD-10-CM

## 2020-12-08 DIAGNOSIS — L97.522 ULCER OF LEFT FOOT, WITH FAT LAYER EXPOSED (HCC): Primary | ICD-10-CM

## 2020-12-08 PROCEDURE — 73630 X-RAY EXAM OF FOOT: CPT

## 2020-12-11 ENCOUNTER — HOSPITAL ENCOUNTER (EMERGENCY)
Facility: HOSPITAL | Age: 75
Discharge: HOME | End: 2020-12-11
Attending: EMERGENCY MEDICINE | Admitting: EMERGENCY MEDICINE
Payer: MEDICARE

## 2020-12-11 VITALS
TEMPERATURE: 97.6 F | DIASTOLIC BLOOD PRESSURE: 79 MMHG | RESPIRATION RATE: 20 BRPM | HEART RATE: 65 BPM | OXYGEN SATURATION: 100 % | SYSTOLIC BLOOD PRESSURE: 145 MMHG

## 2020-12-11 DIAGNOSIS — E11.621 DIABETIC FOOT ULCER (HCC): Primary | ICD-10-CM

## 2020-12-11 DIAGNOSIS — L97.509 DIABETIC FOOT ULCER (HCC): Primary | ICD-10-CM

## 2020-12-11 PROCEDURE — 99282 EMERGENCY DEPT VISIT SF MDM: CPT | Performed by: EMERGENCY MEDICINE

## 2020-12-11 PROCEDURE — 99283 EMERGENCY DEPT VISIT LOW MDM: CPT

## 2020-12-18 ENCOUNTER — HOSPITAL ENCOUNTER (INPATIENT)
Facility: HOSPITAL | Age: 75
LOS: 19 days | DRG: 617 | End: 2021-01-06
Attending: PODIATRIST | Admitting: PODIATRIST
Payer: MEDICARE

## 2020-12-18 DIAGNOSIS — I25.10 CORONARY ARTERY DISEASE INVOLVING NATIVE CORONARY ARTERY OF NATIVE HEART WITHOUT ANGINA PECTORIS: ICD-10-CM

## 2020-12-18 DIAGNOSIS — R30.0 DYSURIA: ICD-10-CM

## 2020-12-18 DIAGNOSIS — Z01.818 PREOP EXAMINATION: ICD-10-CM

## 2020-12-18 DIAGNOSIS — F41.9 ANXIETY: ICD-10-CM

## 2020-12-18 DIAGNOSIS — M86.679 OTHER CHRONIC OSTEOMYELITIS OF FOOT, UNSPECIFIED LATERALITY (HCC): Primary | ICD-10-CM

## 2020-12-18 DIAGNOSIS — N18.30 STAGE 3 CHRONIC KIDNEY DISEASE, UNSPECIFIED WHETHER STAGE 3A OR 3B CKD (HCC): ICD-10-CM

## 2020-12-18 DIAGNOSIS — E78.2 MIXED HYPERLIPIDEMIA: ICD-10-CM

## 2020-12-18 DIAGNOSIS — M86.272 SUBACUTE OSTEOMYELITIS OF LEFT FOOT (HCC): ICD-10-CM

## 2020-12-18 DIAGNOSIS — R09.89 LEFT CAROTID BRUIT: ICD-10-CM

## 2020-12-18 DIAGNOSIS — I70.209 ARTERIOSCLEROSIS OF ARTERY OF EXTREMITY (HCC): Primary | ICD-10-CM

## 2020-12-18 DIAGNOSIS — E11.42 DIABETIC POLYNEUROPATHY ASSOCIATED WITH TYPE 2 DIABETES MELLITUS (HCC): ICD-10-CM

## 2020-12-18 DIAGNOSIS — N18.4 CHRONIC KIDNEY DISEASE, STAGE 4 (SEVERE) (HCC): ICD-10-CM

## 2020-12-18 DIAGNOSIS — B95.61 MSSA BACTEREMIA: ICD-10-CM

## 2020-12-18 DIAGNOSIS — R78.81 MSSA BACTEREMIA: ICD-10-CM

## 2020-12-18 LAB
ALBUMIN SERPL BCP-MCNC: 3.5 G/DL (ref 3.5–5)
ALP SERPL-CCNC: 115 U/L (ref 46–116)
ALT SERPL W P-5'-P-CCNC: 61 U/L (ref 12–78)
ANION GAP SERPL CALCULATED.3IONS-SCNC: 7 MMOL/L (ref 4–13)
AST SERPL W P-5'-P-CCNC: 22 U/L (ref 5–45)
BASOPHILS # BLD AUTO: 0.03 THOUSANDS/ΜL (ref 0–0.1)
BASOPHILS NFR BLD AUTO: 0 % (ref 0–1)
BILIRUB SERPL-MCNC: 0.46 MG/DL (ref 0.2–1)
BUN SERPL-MCNC: 50 MG/DL (ref 5–25)
CALCIUM SERPL-MCNC: 8.9 MG/DL (ref 8.3–10.1)
CHLORIDE SERPL-SCNC: 106 MMOL/L (ref 100–108)
CO2 SERPL-SCNC: 27 MMOL/L (ref 21–32)
CREAT SERPL-MCNC: 2.47 MG/DL (ref 0.6–1.3)
EOSINOPHIL # BLD AUTO: 0.11 THOUSAND/ΜL (ref 0–0.61)
EOSINOPHIL NFR BLD AUTO: 1 % (ref 0–6)
ERYTHROCYTE [DISTWIDTH] IN BLOOD BY AUTOMATED COUNT: 13.2 % (ref 11.6–15.1)
ERYTHROCYTE [SEDIMENTATION RATE] IN BLOOD: 42 MM/HOUR (ref 0–19)
EST. AVERAGE GLUCOSE BLD GHB EST-MCNC: 166 MG/DL
GFR SERPL CREATININE-BSD FRML MDRD: 28 ML/MIN/1.73SQ M
GLUCOSE SERPL-MCNC: 121 MG/DL (ref 65–140)
GLUCOSE SERPL-MCNC: 121 MG/DL (ref 65–140)
GLUCOSE SERPL-MCNC: 141 MG/DL (ref 65–140)
HBA1C MFR BLD: 7.4 %
HCT VFR BLD AUTO: 33.7 % (ref 36.5–49.3)
HGB BLD-MCNC: 10.9 G/DL (ref 12–17)
IMM GRANULOCYTES # BLD AUTO: 0.04 THOUSAND/UL (ref 0–0.2)
IMM GRANULOCYTES NFR BLD AUTO: 0 % (ref 0–2)
LYMPHOCYTES # BLD AUTO: 1.83 THOUSANDS/ΜL (ref 0.6–4.47)
LYMPHOCYTES NFR BLD AUTO: 16 % (ref 14–44)
MCH RBC QN AUTO: 30.2 PG (ref 26.8–34.3)
MCHC RBC AUTO-ENTMCNC: 32.3 G/DL (ref 31.4–37.4)
MCV RBC AUTO: 93 FL (ref 82–98)
MONOCYTES # BLD AUTO: 0.99 THOUSAND/ΜL (ref 0.17–1.22)
MONOCYTES NFR BLD AUTO: 9 % (ref 4–12)
NEUTROPHILS # BLD AUTO: 8.15 THOUSANDS/ΜL (ref 1.85–7.62)
NEUTS SEG NFR BLD AUTO: 74 % (ref 43–75)
NRBC BLD AUTO-RTO: 0 /100 WBCS
PLATELET # BLD AUTO: 156 THOUSANDS/UL (ref 149–390)
PLATELET # BLD AUTO: 157 THOUSANDS/UL (ref 149–390)
PMV BLD AUTO: 10.3 FL (ref 8.9–12.7)
PMV BLD AUTO: 10.5 FL (ref 8.9–12.7)
POTASSIUM SERPL-SCNC: 5 MMOL/L (ref 3.5–5.3)
PROT SERPL-MCNC: 7.6 G/DL (ref 6.4–8.2)
RBC # BLD AUTO: 3.61 MILLION/UL (ref 3.88–5.62)
SODIUM SERPL-SCNC: 140 MMOL/L (ref 136–145)
WBC # BLD AUTO: 11.15 THOUSAND/UL (ref 4.31–10.16)

## 2020-12-18 PROCEDURE — 87075 CULTR BACTERIA EXCEPT BLOOD: CPT | Performed by: PODIATRIST

## 2020-12-18 PROCEDURE — 83036 HEMOGLOBIN GLYCOSYLATED A1C: CPT | Performed by: PODIATRIST

## 2020-12-18 PROCEDURE — 85652 RBC SED RATE AUTOMATED: CPT | Performed by: PODIATRIST

## 2020-12-18 PROCEDURE — 87070 CULTURE OTHR SPECIMN AEROBIC: CPT | Performed by: PODIATRIST

## 2020-12-18 PROCEDURE — 99221 1ST HOSP IP/OBS SF/LOW 40: CPT | Performed by: SURGERY

## 2020-12-18 PROCEDURE — 87185 SC STD ENZYME DETCJ PER NZM: CPT | Performed by: PODIATRIST

## 2020-12-18 PROCEDURE — 87040 BLOOD CULTURE FOR BACTERIA: CPT | Performed by: PODIATRIST

## 2020-12-18 PROCEDURE — 85025 COMPLETE CBC W/AUTO DIFF WBC: CPT | Performed by: PODIATRIST

## 2020-12-18 PROCEDURE — 87205 SMEAR GRAM STAIN: CPT | Performed by: PODIATRIST

## 2020-12-18 PROCEDURE — 87186 SC STD MICRODIL/AGAR DIL: CPT | Performed by: PODIATRIST

## 2020-12-18 PROCEDURE — 82948 REAGENT STRIP/BLOOD GLUCOSE: CPT

## 2020-12-18 PROCEDURE — 87077 CULTURE AEROBIC IDENTIFY: CPT | Performed by: PODIATRIST

## 2020-12-18 PROCEDURE — 85049 AUTOMATED PLATELET COUNT: CPT | Performed by: PODIATRIST

## 2020-12-18 PROCEDURE — 80053 COMPREHEN METABOLIC PANEL: CPT | Performed by: PODIATRIST

## 2020-12-18 RX ORDER — ASPIRIN 81 MG/1
81 TABLET, CHEWABLE ORAL DAILY
Status: DISCONTINUED | OUTPATIENT
Start: 2020-12-19 | End: 2021-01-06 | Stop reason: HOSPADM

## 2020-12-18 RX ORDER — CEFAZOLIN SODIUM 2 G/50ML
2000 SOLUTION INTRAVENOUS EVERY 8 HOURS
Status: DISCONTINUED | OUTPATIENT
Start: 2020-12-18 | End: 2020-12-20

## 2020-12-18 RX ORDER — FUROSEMIDE 20 MG/1
20 TABLET ORAL DAILY
Status: DISCONTINUED | OUTPATIENT
Start: 2020-12-19 | End: 2020-12-20

## 2020-12-18 RX ORDER — ACETAMINOPHEN 325 MG/1
975 TABLET ORAL EVERY 6 HOURS SCHEDULED
Status: DISCONTINUED | OUTPATIENT
Start: 2020-12-18 | End: 2021-01-03

## 2020-12-18 RX ORDER — HEPARIN SODIUM 5000 [USP'U]/ML
5000 INJECTION, SOLUTION INTRAVENOUS; SUBCUTANEOUS EVERY 12 HOURS SCHEDULED
Status: DISCONTINUED | OUTPATIENT
Start: 2020-12-18 | End: 2021-01-06 | Stop reason: HOSPADM

## 2020-12-18 RX ORDER — INSULIN GLARGINE 100 [IU]/ML
17 INJECTION, SOLUTION SUBCUTANEOUS
Status: DISCONTINUED | OUTPATIENT
Start: 2020-12-18 | End: 2020-12-20

## 2020-12-18 RX ORDER — FLUTICASONE PROPIONATE 50 MCG
1 SPRAY, SUSPENSION (ML) NASAL DAILY
Status: DISCONTINUED | OUTPATIENT
Start: 2020-12-19 | End: 2021-01-06 | Stop reason: HOSPADM

## 2020-12-18 RX ORDER — ALPRAZOLAM 0.25 MG/1
0.25 TABLET ORAL
Status: DISCONTINUED | OUTPATIENT
Start: 2020-12-18 | End: 2021-01-06 | Stop reason: HOSPADM

## 2020-12-18 RX ORDER — TAMSULOSIN HYDROCHLORIDE 0.4 MG/1
0.4 CAPSULE ORAL
Status: DISCONTINUED | OUTPATIENT
Start: 2020-12-18 | End: 2020-12-21

## 2020-12-18 RX ORDER — AMOXICILLIN 250 MG
1 CAPSULE ORAL
Status: DISCONTINUED | OUTPATIENT
Start: 2020-12-18 | End: 2020-12-27

## 2020-12-18 RX ORDER — PANTOPRAZOLE SODIUM 40 MG/1
40 TABLET, DELAYED RELEASE ORAL
Status: DISCONTINUED | OUTPATIENT
Start: 2020-12-19 | End: 2020-12-19

## 2020-12-18 RX ORDER — OXYCODONE HYDROCHLORIDE 10 MG/1
10 TABLET ORAL EVERY 4 HOURS PRN
Status: DISCONTINUED | OUTPATIENT
Start: 2020-12-18 | End: 2020-12-20

## 2020-12-18 RX ORDER — METOPROLOL SUCCINATE 50 MG/1
100 TABLET, EXTENDED RELEASE ORAL DAILY
Status: DISCONTINUED | OUTPATIENT
Start: 2020-12-19 | End: 2021-01-06 | Stop reason: HOSPADM

## 2020-12-18 RX ORDER — METRONIDAZOLE 500 MG/1
500 TABLET ORAL EVERY 8 HOURS
Status: DISCONTINUED | OUTPATIENT
Start: 2020-12-18 | End: 2020-12-20

## 2020-12-18 RX ORDER — LATANOPROST 50 UG/ML
1 SOLUTION/ DROPS OPHTHALMIC
Status: DISCONTINUED | OUTPATIENT
Start: 2020-12-18 | End: 2021-01-06 | Stop reason: HOSPADM

## 2020-12-18 RX ORDER — POLYETHYLENE GLYCOL 3350 17 G/17G
17 POWDER, FOR SOLUTION ORAL DAILY
Status: DISCONTINUED | OUTPATIENT
Start: 2020-12-19 | End: 2020-12-27

## 2020-12-18 RX ORDER — GABAPENTIN 300 MG/1
600 CAPSULE ORAL DAILY
Status: DISCONTINUED | OUTPATIENT
Start: 2020-12-19 | End: 2021-01-06 | Stop reason: HOSPADM

## 2020-12-18 RX ADMIN — DOCUSATE SODIUM AND SENNOSIDES 1 TABLET: 8.6; 5 TABLET, FILM COATED ORAL at 21:48

## 2020-12-18 RX ADMIN — ACETAMINOPHEN 975 MG: 325 TABLET ORAL at 17:42

## 2020-12-18 RX ADMIN — HEPARIN SODIUM 5000 UNITS: 5000 INJECTION INTRAVENOUS; SUBCUTANEOUS at 21:48

## 2020-12-18 RX ADMIN — TAMSULOSIN HYDROCHLORIDE 0.4 MG: 0.4 CAPSULE ORAL at 16:39

## 2020-12-18 RX ADMIN — INSULIN LISPRO 6 UNITS: 100 INJECTION, SOLUTION INTRAVENOUS; SUBCUTANEOUS at 16:43

## 2020-12-18 RX ADMIN — CEFAZOLIN SODIUM 2000 MG: 2 SOLUTION INTRAVENOUS at 17:37

## 2020-12-18 RX ADMIN — LATANOPROST 1 DROP: 50 SOLUTION OPHTHALMIC at 21:48

## 2020-12-18 RX ADMIN — INSULIN GLARGINE 17 UNITS: 100 INJECTION, SOLUTION SUBCUTANEOUS at 21:48

## 2020-12-18 RX ADMIN — METRONIDAZOLE 500 MG: 500 TABLET ORAL at 16:39

## 2020-12-18 NOTE — ASSESSMENT & PLAN NOTE
Lab Results   Component Value Date    EGFR 28 12/01/2020    EGFR 24 10/16/2020    EGFR 30 10/11/2020    CREATININE 2 50 (H) 12/01/2020    CREATININE 2 79 (H) 10/16/2020    CREATININE 2 35 (H) 10/11/2020     -Cr appears to be baseline  -Will require Nephrology consult if arteriogram necessary   -Avoid nephrotoxins, hypotension

## 2020-12-18 NOTE — CONSULTS
Vascular Surgery    Consult- Chivo Rather 1945, 66 y o  male MRN: [id number]    Unit/Bed#: E5 -01 Encounter: [id number]    Primary Care Provider: Juan Pablo Stephens MD   Date and time admitted to hospital: 12/18/2020 11:52 AM      Inpatient consult to Vascular Surgery  Consult performed by: Teodoro Mcduffie PA-C  Consult ordered by: Rafael Adkins DPM      Assessment/Plan:    * Subacute osteomyelitis of left foot [facility])  Assessment & Plan  66year old male with CAD s/p PCI (2019), HTN, HLD, CKD (baseline Cr 2 3-2 6), DM2, hx of RYGB presenting with LLE wound with underlying OM  Podiatry planning for 5th ray amputation Monday 12/21/2020  Vascular surgery asked to evaluate the patient regarding healing potential     Diagnostics:  LEADs - Pending   XR foot (left) - Cortical destruction along the lateral aspect of the 5th metatarsal head, consistent with osteomyelitis  MRI - Pending   BC - Pending     Recommendations:   -Patient with nonhealing wound to left lower extremity with underlying OM   -LEADs previously performed in October at [facility] suggesting adequate healing potential however will repeat LEADs this admission to reassess and view waveforms   -Continue local wound care per podiatry  -Follow up [facility]   -Labs pending- follow up WBC, Cr   -Continue IV Abx  -Formal recommendations following LEADs  -Patient seen and examined with Dr Balaji Edwards     Chronic kidney disease, stage 4 (severe) [facility])  Assessment & Plan  Lab Results   Component Value Date    EGFR 28 12/01/2020    EGFR 24 10/16/2020    EGFR 30 10/11/2020    CREATININE 2 50 (H) 12/01/2020    CREATININE 2 79 (H) 10/16/2020    CREATININE 2 35 (H) 10/11/2020     -Cr appears to be baseline  -Will require Nephrology consult if arteriogram necessary   -Avoid nephrotoxins, hypotension       Subjective:     Roosevelt Rodrigues is a 66year old male with multiple medical comorbidities who presents with a nonhealing wound to the LLE that has been present for 3 months   He states that he has been seen as an outpatient at the wound center  He thought that it was healing but it was determined that he was developing OM of the 5th metatarsal  Podiatry is planning for a 5th ray amputation on Monday 12/21 and has asked for him to be evaluated regarding healing potential      Overall he denies symptoms consistent with claudication or rest pain  He has never had a peripheral vascular procedure or surgery before  He did have LEAD[facility] performed at [facility] this past fall       ROS:   Consitutional: Negative for fevers or chills   HENT: Negative  Eyes: Negative for visual disturbance   Cardiovascular: Negative   Pulmonary: Negative  Gastrointestinal: Negative   Genitourinary: Negative   Musculoskeletal: Negative   Neurologic: Negative   Skin: Positive for wound  Endocrine: Negative  Psych: Negative       Medications:   Current Facility-Administered Medications   Medication Dose Route Frequency Provider Last Rate    acetaminophen  975 mg Oral Q6H [address] Magaly Capps DPM      ALPRAZolam  0 25 mg Oral HS PRN Magaly Capps DPM      [START ON 12/19/2020] aspirin  81 mg Oral Daily Magaly Capps DPM      cefazolin  2,000 mg Intravenous Q8H Magaly Capps DPM      [START ON 12/19/2020] fluticasone  1 spray Each Nare Daily Magaly Capps DPM      [START ON 12/19/2020] furosemide  20 mg Oral Daily Magaly Capps DPM      [START ON 12/19/2020] gabapentin  600 mg Oral Daily Magaly Capps DPM      heparin (porcine)  5,000 Units Subcutaneous Q12H [address] Magaly Capps DPM      insulin glargine  17 Units Subcutaneous HS Magaly Capps DPM      insulin lispro  6 Units Subcutaneous TID With Meals Magaly Capps DPM      [START ON 12/19/2020] isosorbide mononitrate  90 mg Oral Daily Magaly Capps DPM      latanoprost  1 drop Both Eyes HS Magaly Capps DPM      [START ON 12/19/2020] metoprolol succinate  100 mg Oral Daily Magaly Capps DPM      metroNIDAZOLE  500 mg Oral Q8H Magaly Capps DPM      oxyCODONE 10 mg Oral Q4H PRN Rafael Adkins, DEJUAN      [START ON 12/19/2020] pantoprazole  40 mg Oral Early Morning Rafael Adkins, DPM      [START ON 12/19/2020] polyethylene glycol  17 g Oral Daily Rafael Adkins, DPM      senna-docusate sodium  1 tablet Oral HS Rafael Adkins DPM      [START ON 12/19/2020] sertraline  50 mg Oral Daily KRISTOFER EdwardsM      tamsulosin  0 4 mg Oral Daily With Anders Castro DPM          History:  Family History   Problem Relation Age of Onset    Diabetes Mother     Heart disease Mother     Other Mother         High blood pressure    Heart disease Father     Diabetes Sister     Other Sister         High blood pressure    Kidney disease Sister     Heart disease Brother     Other Brother         High blood pressure      Social History      Vitals:   Vitals:    12/18/20 1152 12/18/20 1535   BP: 112/72 142/90   BP Location: Left arm Right arm   Pulse: 65 68   Resp:  18   Temp: (!) 97 4 °F (36 3 °C) 97 8 °F (36 6 °C)   TempSrc: Temporal Temporal   SpO2: 100% 100%        I's/O's:  No intake or output data in the 24 hours ending 12/18/20 1720     Exam:    General: Alert and oriented  In no acute distress  Neck: Supple  Cardiovascular: RRR  Lungs: Clear to auscultation  Normal effort  Abdomen: Soft, nontender   Multiple scars s/p cholecystectomy and laparoscopic RYGB   Extremities: Wound to the LLE with bandages intact   Neurologic: Grossly normal     Pulses:  Femoral: Right- 1+ Left- 1+  Popliteal: Left- 3+      Labs:  Lab Results   Component Value Date    WBC 8 97 12/01/2020    HGB 11 5 (L) 12/01/2020    HCT 36 1 (L) 12/01/2020    MCV 94 12/01/2020     12/18/2020      Lab Results   Component Value Date    CREATININE 2 50 (H) 12/01/2020    BUN 40 (H) 12/01/2020     09/03/2015    K 4 9 12/01/2020     12/01/2020    CO2 27 12/01/2020     Lab Results   Component Value Date    INR 0 98 02/03/2019    INR 0 98 09/19/2018    INR 1 07 04/21/2017    PROTIME 13 1 02/03/2019 PROTIME 13 1 2018    PROTIME 13 9 2017      Lab Results   Component Value Date    PTT 30 2018       Lab Results   Component Value Date    HGBA1C 7 5 (H) 10/16/2020          Imagin   As stated above       Brenden Bowen PA-C  20

## 2020-12-18 NOTE — PLAN OF CARE
Problem: Potential for Falls  Goal: Patient will remain free of falls  Description: INTERVENTIONS:  - Assess patient frequently for physical needs  -  Identify cognitive and physical deficits and behaviors that affect risk of falls    -  [place] fall precautions as indicated by assessment   - Educate patient/family on patient safety including physical limitations  - Instruct patient to call for assistance with activity based on assessment  - Modify environment to reduce risk of injury  - Consider OT/PT consult to assist with strengthening/mobility  Outcome: Progressing     Problem: MUSCULOSKELETAL - ADULT  Goal: Maintain or return mobility to safest level of function  Description: INTERVENTIONS:  - Assess patient's ability to carry out ADLs; assess patient's baseline for ADL function and identify physical deficits which impact ability to perform ADLs (bathing, care of mouth/teeth, toileting, grooming, dressing, etc )  - Assess/evaluate cause of self-care deficits   - Assess range of motion  - Assess patient's mobility  - Assess patient's need for assistive devices and provide as appropriate  - Encourage maximum independence but intervene and supervise when necessary  - Involve family in performance of ADLs  - Assess for home care needs following discharge   - Consider OT consult to assist with ADL evaluation and planning for discharge  - Provide patient education as appropriate  Outcome: Progressing  Goal: Maintain proper alignment of affected body part  Description: INTERVENTIONS:  - Support, maintain and protect limb and body alignment  - Provide patient/ family with appropriate education  Outcome: Progressing

## 2020-12-18 NOTE — H&P
Agip U  91  H&P  Fran Benavides 66 y o  male MRN: [id number]  Unit/Bed#: E5 -01 Encounter: [id number]  Admission Date: 12/18/20    ASSESSMENT:    Fran Benavides is a 66 y o  male with:    1  Left 5th metatarsal OM as confirmed by radiograph  2  Cellulitis of left foot  3  T2DM  4  CAD  5  PAD  6  HTN  7  HLD    PLAN:    · MRI ordered to determine extent of OM  · Case request 12/21/2020 with Dr Kyler Wayne  · Vascular consult pending to determine healing potential LEADs performed at [facility]  · SLIM consulted for pre-operative clearance and diabetes medication recommendations  · Wound culture pending  · bloodwork pending  · Will discuss this plan with my attending and update as needed  Antibiotics started: Ancef and metronidazole  Pharmacologic VTE Prophylaxis: Heparin   Mechanical VTE Prophylaxis: sequential compression device       Disposition:  Patient requires >2 midnight stay for surgical intervention  SUBJECTIVE    History of Present Illness:    Fran Benavides is a 66 y o  male who presents with Left 5th met OM as confirmed by Xray  He has left foot wound that has been treated in wound care by Dr Chet Lemus  He presents today with a bandaid over his wound  He saw Dr Jessica Haque last week for debridement of wound  Suspicion of osteomyelitis due to xr  Radiographs reviewed showing osteomyelitis of the 5th metatarsal   Over the last day he has noticed increased redness pain to the foot  He has also noticed blood sugar has risen in was over 150 mg/dL this morning which is abnormally high  He does know his last A1c is states that it was 7 5%  He states that he has 7 stents in his heart and was on blood thinners previously for this  He no longer takes blood thinners this time  Endorses not having much feeling in the left foot however is noted increasing pain over the past couple days  Echo from 2019 shows EF of 60%  He denies nausea, vomiting, fever, chills, chest pain, shortness of breath  Review of Systems:    Constitutional: Negative  HENT: Negative  Eyes: Negative  Respiratory: Negative  Cardiovascular: Negative  Gastrointestinal: Negative  Musculoskeletal: left foot pain   Skin:left plantar foot wound   Neurological: numbness of feet  Psych: Negative  Past Medical and Surgical History:     Past Medical History:   Diagnosis Date    Anemia     Last assessed: 9/28/17    Arteriosclerotic cardiovascular disease     Last assessed: 9/28/17    Bladder cancer ([place] [address] )     bladder    CAD (coronary artery disease)     Chronic kidney disease     CKD (chronic kidney disease) stage 4, GFR 15-29 ml/min ([facility])     Colon polyp     Diabetes mellitus ([place] [address] )     Foot ulcer ([place] [address] ) 09/2020    left foot- treated with antibiotics, followed by podiatrist    GERD (gastroesophageal reflux disease)     Hypertension     Hypotension     Last assessed: 2/24/15    Insomnia     Last assessed: 11/14/12    Loss of hearing     has hearing aids but usually does not wear them    Other seasonal allergic rhinitis     Last assessed: 2/10/16    Shortness of breath     Transient cerebral ischemia     Wears glasses        Past Surgical History:   Procedure Laterality Date    CARDIAC SURGERY      Cath stent placement  Last assessed: 3/9/17  Interventional Catheterization    CHOLECYSTECTOMY      COLONOSCOPY      CYSTOSCOPY      Diagnostic w/biopsy  Tereso Knuckles  Last assessed: 12/1/14    CYSTOURETHROSCOPY      w/cautery  Tereso Knuckles    GASTRIC BYPASS      For morbid obesity w/Shaji-en-Y   Resolved: 11/17/09    INCISION AND DRAINAGE OF WOUND Right 2/26/2017    Procedure: INCISION AND DRAINAGE (I&D) EXTREMITY WITH APPLICATION OF GRAFT JACKET;  Surgeon: Cintia Fierro DPM;  [facility];  Service:     INCISION AND DRAINAGE OF WOUND Right 4/25/2017    Procedure: INCISION AND DRAINAGE (I&D) EXTREMITY, APPLICATION OF GRAFT;  Surgeon: Cintia Fierro DPM;  [facility]; Service:     IR BIOPSY OTHER  7/2/2020    JOINT REPLACEMENT      Knee   Last assessed: 1/28/11    TX CYSTOURETHROSCOPY,BIOPSY N/A 8/16/2016    Procedure: CYSTOSCOPY WITH BIOPSIES;  Surgeon: Ventura Barragan MD;  [facility];  Service: Urology    TX CYSTOURETHROSCOPY,FULGUR <0 5 CM LESN N/A 11/19/2020    Procedure: CYSTO W/BIOPSIES, transurethral prostate bx;  Surgeon: Silvia Núñez MD;  [facility];  Service: Urology    ROTATOR CUFF REPAIR      SMALL INTESTINE SURGERY      Surgery Shaji-en-Y    SPINAL FUSION      lumbar and cervical fusions    VAC DRESSING APPLICATION Right 0/18/8959    Procedure: APPLICATION VAC DRESSING;  Surgeon: Nancy Castellon DPM;  [facility];  Service:        Meds/Allergies:    Medications Prior to Admission   Medication    ALPRAZolam (XANAX) 0 25 mg tablet    aspirin 81 MG tablet    Azelastine HCl 0 15 % SOLN    bismuth subsalicylate (PEPTO BISMOL) 262 MG chewable tablet    calcitriol (ROCALTROL) 0 25 mcg capsule    Cholecalciferol (Vitamin D3) 1 25 MG (07802 UT) CAPS    fluticasone (FLONASE) 50 mcg/act nasal spray    furosemide (LASIX) 20 mg tablet    gabapentin (NEURONTIN) 300 mg capsule    Insulin Pen Needle 31G X 8 MM MISC    isosorbide mononitrate (IMDUR) 30 mg 24 hr tablet    latanoprost (XALATAN) 0 005 % ophthalmic solution    metoprolol succinate (TOPROL-XL) 100 mg 24 hr tablet    multivitamin (THERAGRAN) TABS    NovoLOG FlexPen 100 units/mL SOPN    omeprazole (PriLOSEC) 20 mg delayed release capsule    oxyCODONE (Roxicodone) 5 mg immediate release tablet    sertraline (ZOLOFT) 50 mg tablet    Lantus SoloStar 100 units/mL injection pen    tamsulosin (FLOMAX) 0 4 mg       Allergies   Allergen Reactions    Atorvastatin Hives, Itching and Rash    Simvastatin Rash and Edema     "ALL STATINS"    Statins Itching    Insulin Lispro Swelling and Edema    Medical Tape      rash to electrodes    Other Itching, Rash and Other (See Comments)     rash to electrodes and adhesives       Social History:    Social History     Marital Status: /Civil Union    Substance Use History:   Social History     Substance and Sexual Activity   Alcohol Use Yes    Frequency: 2-3 times a week    Drinks per session: 1 or 2    Binge frequency: Never     Social History     Tobacco Use   Smoking Status Former Smoker    Quit date: 1970    Years since quittin 9   Smokeless Tobacco Never Used     Social History     Substance and Sexual Activity   Drug Use Not Currently    Types: Marijuana    Comment: quit 2019 had medical marijuana       Family History:    Family History   Problem Relation Age of Onset    Diabetes Mother     Heart disease Mother     Other Mother         High blood pressure    Heart disease Father     Diabetes Sister     Other Sister         High blood pressure    Kidney disease Sister     Heart disease Brother     Other Brother         High blood pressure         OBJECTIVE:    First Vitals:   Blood Pressure: 112/72 (20 1152)  Pulse: 65 (20 1152)  Temperature: (!) 97 4 °F (36 3 °C) (20 1152)  Temp Source: Temporal (20 1152)  Respirations: 18 (20 1535)  SpO2: 100 % (20 1152)    Current Vitals:   Blood Pressure: 142/90 (20 1535)  Pulse: 68 (20 1535)  Temperature: 97 8 °F (36 6 °C) (20 1535)  Temp Source: Temporal (20 1535)  Respirations: 18 (20 1535)  SpO2: 100 % (20 1535)      Physical Exam  Vitals signs and nursing note reviewed  Constitutional:       General: He is not in acute distress  Appearance: He is not ill-appearing, toxic-appearing or diaphoretic  HENT:      Head: Normocephalic and atraumatic  Nose: Nose normal    Eyes:      Pupils: Pupils are equal, round, and reactive to light  Cardiovascular:      Rate and Rhythm: Regular rhythm  Heart sounds: Normal heart sounds     Pulmonary:      Effort: Pulmonary effort is normal  Breath sounds: Normal breath sounds  Abdominal:      General: Bowel sounds are normal       Palpations: Abdomen is soft  Skin:     General: Skin is warm  Neurological:      Mental Status: He is alert and oriented to person, place, and time  Mental status is at baseline  Psychiatric:         Mood and Affect: Mood normal          Behavior: Behavior normal          Thought Content: Thought content normal          Judgment: Judgment normal          General Appearance: Alert, cooperative, no distress  HEENT: Head normocephalic, atraumatic, without obvious abnormality  Heart: Normal rate and rhythm  No murmurs or gallops noted  Lungs: Non-labored breathing  No respiratory distress  No wheezing, rhonchi, or rales  Abdomen:  Soft, nontender, without distension  Psychiatric: AAOx3  Lower Extremity:  Vascular:   Pedal pulses are absent and biphasic by Doppler DP,PT,Perorator  Left peroneal monophasic by doppler CRT < 3 seconds at the digits  +0/4 edema noted at bilateral lower extremities  Pedal hair is absent  Skin temperature is warm to the left foot    Musculoskeletal:  MMT is 5/5 in all muscle compartments bilaterally  ROM at the 1st MPJ and ankle joint are limited bilaterally with the leg extended  Pain on palpation of left lateral foot  Dermatological:       Lower extremity wounds as noted below:    Wound (1) located left plantar foot, measures approximately 0 5 cm x 0 4 cm x 2 0 cm  with sinus tracking or undermining  Wound bed appears slough, fibrotic and has underlying bone with serous green colored discharge  Deepest tissue noted in base is left 5th metatarsal  Malodor is not noticed  Wound edge appears non-attached  Chayo-wound skin appears intact, erythematous and warm to touch  Probe to bone is positive  Signs of infection are present at this time  Neurological:  Gross sensation is intact  Protective sensation is absent  Patient Denies numbness and/or paresthesias          Lab Results:   No visits with results within 1 Day(s) from this visit  Latest known visit with results is:   Appointment on 12/01/2020   Component Date Value    WBC 12/01/2020 8 97     RBC 12/01/2020 3 86*    Hemoglobin 12/01/2020 11 5*    Hematocrit 12/01/2020 36 1*    MCV 12/01/2020 94     MCH 12/01/2020 29 8     MCHC 12/01/2020 31 9     RDW 12/01/2020 13 2     MPV 12/01/2020 11 6     Platelets 70/16/0571 173     nRBC 12/01/2020 0     Neutrophils Relative 12/01/2020 65     Immat GRANS % 12/01/2020 0     Lymphocytes Relative 12/01/2020 23     Monocytes Relative 12/01/2020 7     Eosinophils Relative 12/01/2020 4     Basophils Relative 12/01/2020 1     Neutrophils Absolute 12/01/2020 5 86     Immature Grans Absolute 12/01/2020 0 03     Lymphocytes Absolute 12/01/2020 2 08     Monocytes Absolute 12/01/2020 0 64     Eosinophils Absolute 12/01/2020 0 31     Basophils Absolute 12/01/2020 0 05     Sodium 12/01/2020 139     Potassium 12/01/2020 4 9     Chloride 12/01/2020 108     CO2 12/01/2020 27     ANION GAP 12/01/2020 4     BUN 12/01/2020 40*    Creatinine 12/01/2020 2 50*    Glucose, Fasting 12/01/2020 120*    Calcium 12/01/2020 9 1     AST 12/01/2020 27     ALT 12/01/2020 47     Alkaline Phosphatase 12/01/2020 113     Total Protein 12/01/2020 7 5     Albumin 12/01/2020 3 9     Total Bilirubin 12/01/2020 0 37     eGFR 12/01/2020 28     TSH 3RD GENERATON 12/01/2020 1 650     Magnesium 12/01/2020 2 3     Phosphorus 12/01/2020 3 2     PTH 12/01/2020 137 4*    Uric Acid 12/01/2020 6 9        Cultures:            Imaging: I have personally reviewed pertinent films in PACS  No results found  Pathology, and Other Studies: I have personally reviewed pertinent reports        Code Status: Level 1 - Full Code

## 2020-12-18 NOTE — ASSESSMENT & PLAN NOTE
66year old male with CAD s/p PCI (2019), HTN, HLD, CKD (baseline Cr 2 3-2 6), DM2, hx of RYGB presenting with LLE wound with underlying OM  Podiatry planning for 5th ray amputation Monday 12/21/2020   Vascular surgery asked to evaluate the patient regarding healing potential     Diagnostics:  LEADs - Pending   XR foot (left) - Cortical destruction along the lateral aspect of the 5th metatarsal head, consistent with osteomyelitis  MRI - Pending   BC - Pending     Recommendations:   -Patient with nonhealing wound to left lower extremity with underlying OM   -LEADs previously performed in October at [facility] suggesting adequate healing potential however will repeat LEADs this admission to reassess and view waveforms   -Continue local wound care per podiatry  -Follow up [facility]   -Labs pending- follow up WBC, Cr   -Continue IV Abx  -Formal recommendations following LEADs  -Patient seen and examined with Dr Gregorio Murray

## 2020-12-19 ENCOUNTER — APPOINTMENT (INPATIENT)
Dept: MRI IMAGING | Facility: HOSPITAL | Age: 75
DRG: 617 | End: 2020-12-19
Payer: MEDICARE

## 2020-12-19 PROBLEM — Z01.818 PREOPERATIVE EXAMINATION: Status: ACTIVE | Noted: 2020-12-19

## 2020-12-19 PROBLEM — D63.8 ANEMIA OF CHRONIC DISEASE: Status: ACTIVE | Noted: 2020-12-19

## 2020-12-19 LAB
ANION GAP SERPL CALCULATED.3IONS-SCNC: 9 MMOL/L (ref 4–13)
BUN SERPL-MCNC: 49 MG/DL (ref 5–25)
CALCIUM SERPL-MCNC: 8.7 MG/DL (ref 8.3–10.1)
CHLORIDE SERPL-SCNC: 105 MMOL/L (ref 100–108)
CO2 SERPL-SCNC: 23 MMOL/L (ref 21–32)
CREAT SERPL-MCNC: 2.42 MG/DL (ref 0.6–1.3)
CREAT UR-MCNC: 108 MG/DL
ERYTHROCYTE [DISTWIDTH] IN BLOOD BY AUTOMATED COUNT: 13.2 % (ref 11.6–15.1)
FERRITIN SERPL-MCNC: 52 NG/ML (ref 8–388)
GFR SERPL CREATININE-BSD FRML MDRD: 29 ML/MIN/1.73SQ M
GLUCOSE SERPL-MCNC: 107 MG/DL (ref 65–140)
GLUCOSE SERPL-MCNC: 115 MG/DL (ref 65–140)
GLUCOSE SERPL-MCNC: 159 MG/DL (ref 65–140)
GLUCOSE SERPL-MCNC: 177 MG/DL (ref 65–140)
GLUCOSE SERPL-MCNC: 223 MG/DL (ref 65–140)
HCT VFR BLD AUTO: 32.3 % (ref 36.5–49.3)
HGB BLD-MCNC: 10.6 G/DL (ref 12–17)
IRON SATN MFR SERPL: 10 %
IRON SERPL-MCNC: 39 UG/DL (ref 65–175)
MCH RBC QN AUTO: 30 PG (ref 26.8–34.3)
MCHC RBC AUTO-ENTMCNC: 32.8 G/DL (ref 31.4–37.4)
MCV RBC AUTO: 92 FL (ref 82–98)
MICROALBUMIN UR-MCNC: 172 MG/L (ref 0–20)
MICROALBUMIN/CREAT 24H UR: 159 MG/G CREATININE (ref 0–30)
PLATELET # BLD AUTO: 149 THOUSANDS/UL (ref 149–390)
PMV BLD AUTO: 11 FL (ref 8.9–12.7)
POTASSIUM SERPL-SCNC: 4.6 MMOL/L (ref 3.5–5.3)
RBC # BLD AUTO: 3.53 MILLION/UL (ref 3.88–5.62)
SODIUM SERPL-SCNC: 137 MMOL/L (ref 136–145)
TIBC SERPL-MCNC: 385 UG/DL (ref 250–450)
WBC # BLD AUTO: 9.79 THOUSAND/UL (ref 4.31–10.16)

## 2020-12-19 PROCEDURE — 82043 UR ALBUMIN QUANTITATIVE: CPT | Performed by: PODIATRIST

## 2020-12-19 PROCEDURE — G1004 CDSM NDSC: HCPCS

## 2020-12-19 PROCEDURE — 83550 IRON BINDING TEST: CPT | Performed by: STUDENT IN AN ORGANIZED HEALTH CARE EDUCATION/TRAINING PROGRAM

## 2020-12-19 PROCEDURE — 82728 ASSAY OF FERRITIN: CPT | Performed by: STUDENT IN AN ORGANIZED HEALTH CARE EDUCATION/TRAINING PROGRAM

## 2020-12-19 PROCEDURE — 82948 REAGENT STRIP/BLOOD GLUCOSE: CPT

## 2020-12-19 PROCEDURE — 99222 1ST HOSP IP/OBS MODERATE 55: CPT | Performed by: STUDENT IN AN ORGANIZED HEALTH CARE EDUCATION/TRAINING PROGRAM

## 2020-12-19 PROCEDURE — 82570 ASSAY OF URINE CREATININE: CPT | Performed by: PODIATRIST

## 2020-12-19 PROCEDURE — 83540 ASSAY OF IRON: CPT | Performed by: STUDENT IN AN ORGANIZED HEALTH CARE EDUCATION/TRAINING PROGRAM

## 2020-12-19 PROCEDURE — 80048 BASIC METABOLIC PNL TOTAL CA: CPT | Performed by: PODIATRIST

## 2020-12-19 PROCEDURE — 73718 MRI LOWER EXTREMITY W/O DYE: CPT

## 2020-12-19 PROCEDURE — 85027 COMPLETE CBC AUTOMATED: CPT | Performed by: PODIATRIST

## 2020-12-19 RX ORDER — PANTOPRAZOLE SODIUM 40 MG/1
40 TABLET, DELAYED RELEASE ORAL
Status: DISCONTINUED | OUTPATIENT
Start: 2020-12-19 | End: 2021-01-06 | Stop reason: HOSPADM

## 2020-12-19 RX ADMIN — POLYETHYLENE GLYCOL 3350 17 G: 17 POWDER, FOR SOLUTION ORAL at 08:23

## 2020-12-19 RX ADMIN — ASPIRIN 81 MG CHEWABLE TABLET 81 MG: 81 TABLET CHEWABLE at 08:21

## 2020-12-19 RX ADMIN — METOPROLOL SUCCINATE 100 MG: 50 TABLET, EXTENDED RELEASE ORAL at 08:22

## 2020-12-19 RX ADMIN — ACETAMINOPHEN 975 MG: 325 TABLET ORAL at 11:34

## 2020-12-19 RX ADMIN — CEFAZOLIN SODIUM 2000 MG: 2 SOLUTION INTRAVENOUS at 01:36

## 2020-12-19 RX ADMIN — TAMSULOSIN HYDROCHLORIDE 0.4 MG: 0.4 CAPSULE ORAL at 16:16

## 2020-12-19 RX ADMIN — INSULIN LISPRO 1 UNITS: 100 INJECTION, SOLUTION INTRAVENOUS; SUBCUTANEOUS at 16:17

## 2020-12-19 RX ADMIN — DOCUSATE SODIUM AND SENNOSIDES 1 TABLET: 8.6; 5 TABLET, FILM COATED ORAL at 22:31

## 2020-12-19 RX ADMIN — ISOSORBIDE MONONITRATE 90 MG: 30 TABLET, EXTENDED RELEASE ORAL at 08:36

## 2020-12-19 RX ADMIN — CEFAZOLIN SODIUM 2000 MG: 2 SOLUTION INTRAVENOUS at 08:29

## 2020-12-19 RX ADMIN — HEPARIN SODIUM 5000 UNITS: 5000 INJECTION INTRAVENOUS; SUBCUTANEOUS at 08:22

## 2020-12-19 RX ADMIN — ACETAMINOPHEN 975 MG: 325 TABLET ORAL at 05:28

## 2020-12-19 RX ADMIN — PANTOPRAZOLE SODIUM 40 MG: 40 TABLET, DELAYED RELEASE ORAL at 05:28

## 2020-12-19 RX ADMIN — METRONIDAZOLE 500 MG: 500 TABLET ORAL at 16:16

## 2020-12-19 RX ADMIN — LATANOPROST 1 DROP: 50 SOLUTION OPHTHALMIC at 22:31

## 2020-12-19 RX ADMIN — OXYCODONE HYDROCHLORIDE 10 MG: 10 TABLET ORAL at 02:38

## 2020-12-19 RX ADMIN — VANCOMYCIN HYDROCHLORIDE 1750 MG: 1 INJECTION, POWDER, LYOPHILIZED, FOR SOLUTION INTRAVENOUS at 13:29

## 2020-12-19 RX ADMIN — INSULIN GLARGINE 17 UNITS: 100 INJECTION, SOLUTION SUBCUTANEOUS at 22:31

## 2020-12-19 RX ADMIN — GABAPENTIN 600 MG: 300 CAPSULE ORAL at 08:21

## 2020-12-19 RX ADMIN — OXYCODONE HYDROCHLORIDE 10 MG: 10 TABLET ORAL at 08:22

## 2020-12-19 RX ADMIN — INSULIN LISPRO 2 UNITS: 100 INJECTION, SOLUTION INTRAVENOUS; SUBCUTANEOUS at 11:34

## 2020-12-19 RX ADMIN — ACETAMINOPHEN 975 MG: 325 TABLET ORAL at 01:36

## 2020-12-19 RX ADMIN — HEPARIN SODIUM 5000 UNITS: 5000 INJECTION INTRAVENOUS; SUBCUTANEOUS at 22:31

## 2020-12-19 RX ADMIN — INSULIN LISPRO 1 UNITS: 100 INJECTION, SOLUTION INTRAVENOUS; SUBCUTANEOUS at 22:32

## 2020-12-19 RX ADMIN — OXYCODONE HYDROCHLORIDE 10 MG: 10 TABLET ORAL at 13:35

## 2020-12-19 RX ADMIN — METRONIDAZOLE 500 MG: 500 TABLET ORAL at 01:36

## 2020-12-19 RX ADMIN — METRONIDAZOLE 500 MG: 500 TABLET ORAL at 08:22

## 2020-12-19 RX ADMIN — SERTRALINE HYDROCHLORIDE 50 MG: 50 TABLET ORAL at 08:22

## 2020-12-19 RX ADMIN — FUROSEMIDE 20 MG: 20 TABLET ORAL at 08:22

## 2020-12-19 RX ADMIN — CEFAZOLIN SODIUM 2000 MG: 2 SOLUTION INTRAVENOUS at 16:16

## 2020-12-19 NOTE — PROGRESS NOTES
Vancomycin Assessment    Christin Peña is a 66 y o  male who is currently receiving vancomycin for cellulitus and osteomyelitis of the 5th metatarsal     Relevant clinical data and objective history reviewed:  Creatinine   Date Value Ref Range Status   12/19/2020 2 42 (H) 0 60 - 1 30 mg/dL Final     Comment:     Standardized to IDMS reference method   12/18/2020 2 47 (H) 0 60 - 1 30 mg/dL Final     Comment:     Standardized to IDMS reference method   12/01/2020 2 50 (H) 0 60 - 1 30 mg/dL Final     Comment:     Standardized to IDMS reference method   09/03/2015 1 37 (H) 0 60 - 1 30 mg/dL Final     Comment:     Standardized to IDMS reference method   07/26/2015 1 31 (H) 0 60 - 1 30 mg/dL Final     Comment:     Standardized to IDMS reference method   07/25/2015 1 43 (H) 0 60 - 1 30 mg/dL Final     Comment:     Standardized to IDMS reference method     /72 (BP Location: Right arm)   Pulse 75   Temp 98 2 °F (36 8 °C) (Temporal)   Resp 18   SpO2 95%   No intake/output data recorded  Lab Results   Component Value Date/Time    BUN 49 (H) 12/19/2020 05:20 AM    BUN 19 09/03/2015 01:42 PM    WBC 9 79 12/19/2020 05:20 AM    WBC 7 07 09/03/2015 01:42 PM    HGB 10 6 (L) 12/19/2020 05:20 AM    HGB 14 0 09/03/2015 01:42 PM    HCT 32 3 (L) 12/19/2020 05:20 AM    HCT 39 2 09/03/2015 01:42 PM    MCV 92 12/19/2020 05:20 AM    MCV 88 09/03/2015 01:42 PM     12/19/2020 05:20 AM     09/03/2015 01:42 PM     Temp Readings from Last 3 Encounters:   12/19/20 98 2 °F (36 8 °C) (Temporal)   12/11/20 97 6 °F (36 4 °C) (Oral)   12/02/20 (!) 97 °F (36 1 °C)     Vancomycin Days of Therapy: 1    Assessment/Plan  The patient is currently on vancomycin utilizing scheduled dosing based on actual body weight  Baseline risks associated with therapy include: pre-existing renal impairment (stage 4 CKD), nephrotoxic drugs (furosemide), and advanced age   The patient is currently receiving 1750 mg IV q24h and is clinically appropriate and dose will be continued  Pharmacy will also follow closely for s/sx of nephrotoxicity, infusion reactions, and appropriateness of therapy  BMP and CBC will be ordered per protocol  Plan for trough as patient approaches steady state, prior to the 4th  dose at approximately 12/22/20 ~ 11 am   Due to infection severity, will target a trough of 15-20 ng/mL  Pharmacy will continue to follow the patients culture results and clinical progress daily      Hosea Garcia, Pharmacist

## 2020-12-19 NOTE — ASSESSMENT & PLAN NOTE
Lab Results   Component Value Date    HGBA1C 7 4 (H) 12/18/2020       Recent Labs     12/18/20  1637 12/18/20  2128 12/19/20  0720   POCGLU 121 141* 107       Blood Sugar Average: Last 72 hrs:  (P) 123     Home Regimen is 27U HS and 10-12U TID AC  - While on diabetic diet, discontinue TID dosing for now  - Will decrease home regimen further to 15U HS to prevent hypoglycemia

## 2020-12-19 NOTE — ASSESSMENT & PLAN NOTE
At baseline  Renally dose medications      Recent Labs     12/18/20  1705 12/19/20  0520   BUN 50* 49*   CREATININE 2 47* 2 42*   EGFR 28 29

## 2020-12-19 NOTE — PROGRESS NOTES
Progress Note - Podiatry  Demian Albright 66 y o  male MRN: [id number]  Unit/Bed#: E5 -01 Encounter: [id number]    Assessment:  66 y o  male left foot cellulitis and underlying osteomyelitis of 5th metatarsal  - probes to bone with mild purulent drainage and cellulitis on the dorsal foot    1  Left foot osteomyelitis of 5th metatarsal  2  Left foot cellulitis  3  Insulin Dependent Diabetes mellitus  4  Coronary arterial disease  5  Peripheral arterial disease  6  Hypertension    Plan:   Follow-up MRI for surgical planning, will need partial 5th ray amputation, likely Monday 12/21   Follow-up LEADs and further reccs from vascular surgery   Continue with IV ancef and PO metronidazole, wound cultures with 3+ GPC, follow-up final sensitivities; blood cultures NGTD; patient is afebrile without leukocytosis and remains hemodynamically stable   Appreciate medical management per SLIM    Weight bearing status: weight bear as tolerated  Disposition: pending surgical intervention  VTE Pharmacologic Prophylaxis: heparin subq    Subjective/Objective   Chief Complaint: No chief complaint on file  Subjective: 66 y o  male was seen and evaluated at bedside  Reports some pain to the side of his left foot otherwise he is neuropathic  Denies nausea, vomiting, fever, chills, shortness of breath, diarrhea  He is tolerating his antibiotics well  Blood pressure 154/82, pulse 61, temperature 97 7 °F (36 5 °C), temperature source Temporal, resp  rate 18, SpO2 99 %  There is no height or weight on file to calculate BMI        Physical Exam:   General: Alert, cooperative and no distress  Lower Extremities: Neurovascular status at baseline bilaterally, musculoskeletal function within normal limits bilaterally, no calf tenderness bilaterally, left sub 5th met head wound probes to bone with mild purulent drainage, there is erythema present on the dorsal foot, there is undermining      Lab, Imaging and other studies:   Results from last 7 days   Lab Units 12/19/20  0520 12/18/20  1705   WBC Thousand/uL 9 79 11 15*   HEMOGLOBIN g/dL 10 6* 10 9*   HEMATOCRIT % 32 3* 33 7*   PLATELETS Thousands/uL 149 157   NEUTROS PCT %  --  74   LYMPHS PCT %  --  16   MONOS PCT %  --  9   EOS PCT %  --  1     Results from last 7 days   Lab Units 12/19/20  0520 12/18/20  1705   POTASSIUM mmol/L 4 6 5 0   CHLORIDE mmol/L 105 106   CO2 mmol/L 23 27   BUN mg/dL 49* 50*   CREATININE mg/dL 2 42* 2 47*   CALCIUM mg/dL 8 7 8 9   ALK PHOS U/L  --  115   ALT U/L  --  61   AST U/L  --  22           Results from last 7 days   Lab Units 12/18/20  1857 12/18/20  1706 12/18/20  1653   BLOOD CULTURE   --  Received in Microbiology Lab  Culture in Progress  Received in Microbiology Lab  Culture in Progress  GRAM STAIN RESULT  Rare Polys*  3+ Gram positive cocci in clusters*  --   --            Imaging: I have personally reviewed pertinent films and/or reports in PACS  EKG, Pathology, and Other Studies: I have personally reviewed pertinent reports  Portions of the record may have been created with voice recognition software  Occasional wrong word or "sound a like" substitutions may have occurred due to the inherent limitations of voice recognition software  Read the chart carefully and recognize, using context, where substitutions have occurred      Angela Miranda DPM

## 2020-12-19 NOTE — CONSULTS
Consult- Gordo Sierra 1945, 66 y o  male MRN: [id number]    Unit/Bed#: E5 -01 Encounter: [id number]    Primary Care Provider: Lela Kuhn MD   Date and time admitted to hospital: 12/18/2020 11:52 AM      Inpatient consult to Internal Medicine  Consult performed by: Harpreet Valerio MD  Consult ordered by: Josefa Alonso DPM          * Subacute osteomyelitis of left foot [facility])  Assessment & Plan  66year old male admitted due to subacute osteomyelitis of his left foot  X-ray findings showing new cortical destruction along the lateral aspect of the 5th metatarsal head, consistent with osteomyelitis  - For MRI today  - For arterial duplex  - Management care of vascular surgery and podiatry  - IV antibiotics until sensitivities come back  - Escalated antibiotic therapy and added vancomycin due to cultures showing gram positive cocci in clusters  Pharmacy consult placed to assist in dosing  Results from last 7 days   Lab Units 12/18/20  1857 12/18/20  1706 12/18/20  1653   BLOOD CULTURE   --  Received in Microbiology Lab  Culture in Progress  --    GRAM STAIN RESULT  Rare Polys*  3+ Gram positive cocci in clusters*  --  Gram positive cocci in clusters*               Preoperative examination  Assessment & Plan  He has an RCRI score of 3 at minimum    This patient's comorbidities and planned procedure puts him at a Class IV risk or a 15 0% 30-day risk of death, MI, or cardiac arrest     Unfortunately given his 5th metatarsal osteomyelitis assessing his METS via walking up a flight of stairs is limited  Given his elevated risk, would like to reach out to cardiology  Although I suspect no intervention would be necessary given no chest pain or shortness of breath  Once cleared by cardiology, no additional workup is needed from a medical standpoint  Anemia of chronic disease  Assessment & Plan  Not evidence of acute blood loss   Continue monitoring   - Iron studies sent    Recent Labs 12/18/20  1705 12/19/20  0520   HGB 10 9* 10 6*   MCV 93 92   RDW 13 2 13 2         Chronic kidney disease, stage 4 (severe) (HCC)  Assessment & Plan  At baseline  Renally dose medications  Recent Labs     12/18/20  1705 12/19/20  0520   BUN 50* 49*   CREATININE 2 47* 2 42*   EGFR 28 29               CAD (coronary artery disease)  Assessment & Plan  Continue aspirin, metoprolol and Imdur    Type 2 diabetes mellitus, with long-term current use of insulin [facility])  Assessment & Plan  Lab Results   Component Value Date    HGBA1C 7 4 (H) 12/18/2020       Recent Labs     12/18/20  1637 12/18/20  2128 12/19/20  0720   POCGLU 121 141* 107       Blood Sugar Average: Last 72 hrs:  (P) 123     Home Regimen is 27U HS and 10-12U TID AC  - While on diabetic diet, discontinue TID dosing for now  - Will decrease home regimen further to 15U HS to prevent hypoglycemia  Essential hypertension  Assessment & Plan  Continue lasix and metoprolol    Hyperlipidemia  Assessment & Plan  Diet controlled      VTE Prophylaxis: Heparin  / sequential compression device     Recommendations for Discharge:  · No changes with insulin regimen on discharge    Counseling / Coordination of Care Time: 1 hour  Greater than 50% of total time spent on patient counseling and coordination of care  Collaboration of Care: Were Recommendations Directly Discussed with Primary Treatment Team? - No     History of Present Illness:    Manuel Izaguirre is a 66 y o  male who is originally admitted to the podiatry service due to 5th metatarsal head osteomyelitis  We are consulted for management of his diabetes  Patient currently follows up at the [address] [place] [address] for the management of his left foot wound  On his follow-up appointment, evaluation was highly suspicious for osteomyelitis    X-rays was performed which showed osteomyelitis of the right 5th metatarsal   He states that there was increasing redness, swelling, and pain thus his admission and eventual surgical intervention  Given inpatient hospital ADA diet, will decrease current regimen for the time being to prevent hypoglycemia  Will titrate it as needed  Review of Systems:    Review of Systems   Constitutional: Negative for chills, fatigue and fever  HENT: Negative for ear pain  Eyes: Negative for pain  Respiratory: Negative for apnea, cough, choking, chest tightness, shortness of breath and wheezing  Cardiovascular: Negative for chest pain and palpitations  Gastrointestinal: Negative for abdominal distention, diarrhea and nausea  Endocrine: Negative for polydipsia  Genitourinary: Negative for dysuria  Musculoskeletal: Positive for gait problem  Neurological: Negative for seizures, syncope, weakness, numbness and headaches  Psychiatric/Behavioral: Negative for agitation and confusion  Past Medical and Surgical History:     Past Medical History:   Diagnosis Date    Anemia     Last assessed: 9/28/17    Arteriosclerotic cardiovascular disease     Last assessed: 9/28/17    Bladder cancer ([place] [address] )     bladder    CAD (coronary artery disease)     Chronic kidney disease     CKD (chronic kidney disease) stage 4, GFR 15-29 ml/min ([facility])     Colon polyp     Diabetes mellitus ([facility] )     Foot ulcer ([facility] ) 09/2020    left foot- treated with antibiotics, followed by podiatrist    GERD (gastroesophageal reflux disease)     Hypertension     Hypotension     Last assessed: 2/24/15    Insomnia     Last assessed: 11/14/12    Loss of hearing     has hearing aids but usually does not wear them    Other seasonal allergic rhinitis     Last assessed: 2/10/16    Shortness of breath     Transient cerebral ischemia     Wears glasses        Past Surgical History:   Procedure Laterality Date    CARDIAC SURGERY      Cath stent placement  Last assessed: 3/9/17  Interventional Catheterization    CHOLECYSTECTOMY      COLONOSCOPY      CYSTOSCOPY      Diagnostic w/biopsy  Severa Frock   Last assessed: 12/1/14    CYSTOURETHROSCOPY      w/cautery  Tereso Bennett    GASTRIC BYPASS      For morbid obesity w/Shaji-en-Y  Resolved: 11/17/09    INCISION AND DRAINAGE OF WOUND Right 2/26/2017    Procedure: INCISION AND DRAINAGE (I&D) EXTREMITY WITH APPLICATION OF GRAFT JACKET;  Surgeon: Cintia Fierro DPM;  [facility];  Service:     INCISION AND DRAINAGE OF WOUND Right 4/25/2017    Procedure: INCISION AND DRAINAGE (I&D) EXTREMITY, APPLICATION OF GRAFT;  Surgeon: Cintia Fierro DPM;  [facility];  Service:     IR BIOPSY OTHER  7/2/2020    JOINT REPLACEMENT      Knee  Last assessed: 1/28/11    PA CYSTOURETHROSCOPY,BIOPSY N/A 8/16/2016    Procedure: CYSTOSCOPY WITH BIOPSIES;  Surgeon: Rockie Lombard, MD;  [facility];  Service: Urology    PA CYSTOURETHROSCOPY,FULGUR <0 5 CM LESN N/A 11/19/2020    Procedure: CYSTO W/BIOPSIES, transurethral prostate bx;  Surgeon: Ray Gaston MD;  [facility];  Service: Urology    ROTATOR CUFF REPAIR      SMALL INTESTINE SURGERY      Surgery Shaji-en-Y    SPINAL FUSION      lumbar and cervical fusions    VAC DRESSING APPLICATION Right 8/67/0100    Procedure: APPLICATION VAC DRESSING;  Surgeon: Cintia Fierro DPM;  [facility];  Service:        Meds/Allergies:    all medications and allergies reviewed    Allergies:    Allergies   Allergen Reactions    Atorvastatin Hives, Itching and Rash    Simvastatin Rash and Edema     "ALL STATINS"    Statins Itching    Insulin Lispro Swelling and Edema    Medical Tape      rash to electrodes    Other Itching, Rash and Other (See Comments)     rash to electrodes and adhesives       Social History:     Marital Status: /Civil Union    Substance Use History:   Social History     Substance and Sexual Activity   Alcohol Use Yes    Frequency: 2-3 times a week    Drinks per session: 1 or 2    Binge frequency: Never     Social History     Tobacco Use   Smoking Status Former Smoker    Quit date: 1970    Years since quittin 0   Smokeless Tobacco Never Used     Social History     Substance and Sexual Activity   Drug Use Not Currently    Types: Marijuana    Comment: quit 2019 had medical marijuana       Family History:    Family History   Problem Relation Age of Onset    Diabetes Mother     Heart disease Mother     Other Mother         High blood pressure    Heart disease Father     Diabetes Sister     Other Sister         High blood pressure    Kidney disease Sister     Heart disease Brother     Other Brother         High blood pressure       Physical Exam:     Vitals:   Blood Pressure: 133/72 (20 0811)  Pulse: 75 (20 08)  Temperature: 98 2 °F (36 8 °C) (20 08)  Temp Source: Temporal (20)  Respirations: 18 (20)  SpO2: 95 % (20)    Physical Exam  Vitals signs reviewed  HENT:      Head: Normocephalic  Nose: Nose normal       Mouth/Throat:      Mouth: Mucous membranes are moist    Eyes:      General: No scleral icterus  Extraocular Movements: Extraocular movements intact  Pupils: Pupils are equal, round, and reactive to light  Cardiovascular:      Rate and Rhythm: Normal rate and regular rhythm  Pulmonary:      Effort: Pulmonary effort is normal  No respiratory distress  Breath sounds: No wheezing or rales  Abdominal:      General: There is no distension  Palpations: Abdomen is soft  Tenderness: There is no abdominal tenderness  Musculoskeletal:      Comments: LLE dressing c/d/i   Skin:     General: Skin is warm  Neurological:      Mental Status: He is alert  Mental status is at baseline  Psychiatric:         Mood and Affect: Mood normal          Behavior: Behavior normal        Additional Data:     Lab Results: I have personally reviewed pertinent reports        Results from last 7 days   Lab Units 20  0520 20  1705   WBC Thousand/uL 9 79 11 15*   HEMOGLOBIN g/dL 10 6* 10 9*   HEMATOCRIT % 32 3* 33 7*   PLATELETS Thousands/uL 149 157   NEUTROS PCT %  --  74   LYMPHS PCT %  --  16   MONOS PCT %  --  9   EOS PCT %  --  1     Results from last 7 days   Lab Units 12/19/20  0520 12/18/20  1705   SODIUM mmol/L 137 140   POTASSIUM mmol/L 4 6 5 0   CHLORIDE mmol/L 105 106   CO2 mmol/L 23 27   BUN mg/dL 49* 50*   CREATININE mg/dL 2 42* 2 47*   ANION GAP mmol/L 9 7   CALCIUM mg/dL 8 7 8 9   ALBUMIN g/dL  --  3 5   TOTAL BILIRUBIN mg/dL  --  0 46   ALK PHOS U/L  --  115   ALT U/L  --  61   AST U/L  --  22   GLUCOSE RANDOM mg/dL 115 121             Lab Results   Component Value Date/Time    HGBA1C 7 4 (H) 12/18/2020 04:52 PM    HGBA1C 7 5 (H) 10/16/2020 12:53 PM    HGBA1C 7 5 (H) 06/24/2020 08:44 AM    HGBA1C 8 3 (H) 08/31/2019 02:45 AM    HGBA1C 8 3 (H) 08/30/2019 04:27 PM    HGBA1C 8 9 (H) 04/24/2019 01:39 PM     Results from last 7 days   Lab Units 12/19/20  0720 12/18/20 2128 12/18/20  1637   POC GLUCOSE mg/dl 107 141* 121           Imaging: I have personally reviewed pertinent reports  VAS lower limb arterial duplex, complete bilateral    (Results Pending)   MRI foot/forefoot toes left wo contrast    (Results Pending)       EKG, Pathology, and Other Studies Reviewed on Admission:   · EKG: Pending    ** Please Note: This note has been constructed using a voice recognition system   **

## 2020-12-19 NOTE — ASSESSMENT & PLAN NOTE
Not evidence of acute blood loss   Continue monitoring   - Iron studies sent    Recent Labs     12/18/20  1705 12/19/20  0520   HGB 10 9* 10 6*   MCV 93 92   RDW 13 2 13 2

## 2020-12-19 NOTE — ASSESSMENT & PLAN NOTE
He has an RCRI score of 3 at minimum    This patient's comorbidities and planned procedure puts him at a Class IV risk or a 15 0% 30-day risk of death, MI, or cardiac arrest     Unfortunately given his 5th metatarsal osteomyelitis assessing his METS via walking up a flight of stairs is limited  Given his elevated risk, would like to reach out to cardiology  Although I suspect no intervention would be necessary given no chest pain or shortness of breath  Once cleared by cardiology, no additional workup is needed from a medical standpoint      Routine EKG requested

## 2020-12-19 NOTE — ASSESSMENT & PLAN NOTE
66year old male admitted due to subacute osteomyelitis of his left foot  X-ray findings showing new cortical destruction along the lateral aspect of the 5th metatarsal head, consistent with osteomyelitis  - For MRI today  - For arterial duplex  - Management care of vascular surgery and podiatry  - IV antibiotics until sensitivities come back  - Escalated antibiotic therapy and added vancomycin due to cultures showing gram positive cocci in clusters  Pharmacy consult placed to assist in dosing  Results from last 7 days   Lab Units 12/18/20  1857 12/18/20  1706 12/18/20  1653   BLOOD CULTURE   --  Received in Microbiology Lab  Culture in Progress    --    GRAM STAIN RESULT  Rare Polys*  3+ Gram positive cocci in clusters*  --  Gram positive cocci in clusters*

## 2020-12-20 ENCOUNTER — APPOINTMENT (INPATIENT)
Dept: NON INVASIVE DIAGNOSTICS | Facility: HOSPITAL | Age: 75
DRG: 617 | End: 2020-12-20
Payer: MEDICARE

## 2020-12-20 ENCOUNTER — ANESTHESIA EVENT (INPATIENT)
Dept: PERIOP | Facility: HOSPITAL | Age: 75
DRG: 617 | End: 2020-12-20
Payer: MEDICARE

## 2020-12-20 PROBLEM — N17.9 ACUTE KIDNEY INJURY SUPERIMPOSED ON CHRONIC KIDNEY DISEASE (HCC): Status: ACTIVE | Noted: 2020-02-12

## 2020-12-20 PROBLEM — N18.9 ACUTE KIDNEY INJURY SUPERIMPOSED ON CHRONIC KIDNEY DISEASE (HCC): Status: ACTIVE | Noted: 2020-02-12

## 2020-12-20 LAB
ANION GAP SERPL CALCULATED.3IONS-SCNC: 7 MMOL/L (ref 4–13)
BACTERIA SPEC ANAEROBE CULT: ABNORMAL
BACTERIA SPEC ANAEROBE CULT: ABNORMAL
BACTERIA WND AEROBE CULT: ABNORMAL
BACTERIA WND AEROBE CULT: ABNORMAL
BUN SERPL-MCNC: 52 MG/DL (ref 5–25)
CALCIUM SERPL-MCNC: 8.3 MG/DL (ref 8.3–10.1)
CHLORIDE SERPL-SCNC: 105 MMOL/L (ref 100–108)
CO2 SERPL-SCNC: 24 MMOL/L (ref 21–32)
CREAT SERPL-MCNC: 2.86 MG/DL (ref 0.6–1.3)
CREAT UR-MCNC: 100.8 MG/DL
ERYTHROCYTE [DISTWIDTH] IN BLOOD BY AUTOMATED COUNT: 13.2 % (ref 11.6–15.1)
GFR SERPL CREATININE-BSD FRML MDRD: 23 ML/MIN/1.73SQ M
GLUCOSE SERPL-MCNC: 102 MG/DL (ref 65–140)
GLUCOSE SERPL-MCNC: 103 MG/DL (ref 65–140)
GLUCOSE SERPL-MCNC: 174 MG/DL (ref 65–140)
GLUCOSE SERPL-MCNC: 183 MG/DL (ref 65–140)
GLUCOSE SERPL-MCNC: 276 MG/DL (ref 65–140)
GRAM STN SPEC: ABNORMAL
GRAM STN SPEC: ABNORMAL
HCT VFR BLD AUTO: 28 % (ref 36.5–49.3)
HGB BLD-MCNC: 9.2 G/DL (ref 12–17)
MCH RBC QN AUTO: 30.7 PG (ref 26.8–34.3)
MCHC RBC AUTO-ENTMCNC: 32.9 G/DL (ref 31.4–37.4)
MCV RBC AUTO: 93 FL (ref 82–98)
PLATELET # BLD AUTO: 127 THOUSANDS/UL (ref 149–390)
PMV BLD AUTO: 10.8 FL (ref 8.9–12.7)
POTASSIUM SERPL-SCNC: 4.4 MMOL/L (ref 3.5–5.3)
RBC # BLD AUTO: 3 MILLION/UL (ref 3.88–5.62)
SODIUM 24H UR-SCNC: 36 MOL/L
SODIUM SERPL-SCNC: 136 MMOL/L (ref 136–145)
WBC # BLD AUTO: 10.83 THOUSAND/UL (ref 4.31–10.16)

## 2020-12-20 PROCEDURE — 82948 REAGENT STRIP/BLOOD GLUCOSE: CPT

## 2020-12-20 PROCEDURE — 99232 SBSQ HOSP IP/OBS MODERATE 35: CPT | Performed by: STUDENT IN AN ORGANIZED HEALTH CARE EDUCATION/TRAINING PROGRAM

## 2020-12-20 PROCEDURE — 84300 ASSAY OF URINE SODIUM: CPT | Performed by: STUDENT IN AN ORGANIZED HEALTH CARE EDUCATION/TRAINING PROGRAM

## 2020-12-20 PROCEDURE — 80048 BASIC METABOLIC PNL TOTAL CA: CPT | Performed by: PODIATRIST

## 2020-12-20 PROCEDURE — 82570 ASSAY OF URINE CREATININE: CPT | Performed by: STUDENT IN AN ORGANIZED HEALTH CARE EDUCATION/TRAINING PROGRAM

## 2020-12-20 PROCEDURE — 99223 1ST HOSP IP/OBS HIGH 75: CPT | Performed by: INTERNAL MEDICINE

## 2020-12-20 PROCEDURE — 85027 COMPLETE CBC AUTOMATED: CPT | Performed by: PODIATRIST

## 2020-12-20 PROCEDURE — 99232 SBSQ HOSP IP/OBS MODERATE 35: CPT | Performed by: SURGERY

## 2020-12-20 PROCEDURE — 99221 1ST HOSP IP/OBS SF/LOW 40: CPT | Performed by: INTERNAL MEDICINE

## 2020-12-20 PROCEDURE — 93308 TTE F-UP OR LMTD: CPT

## 2020-12-20 RX ORDER — METRONIDAZOLE 500 MG/1
500 TABLET ORAL EVERY 8 HOURS
Status: DISCONTINUED | OUTPATIENT
Start: 2020-12-20 | End: 2020-12-25

## 2020-12-20 RX ORDER — CEFAZOLIN SODIUM 1 G/50ML
1000 SOLUTION INTRAVENOUS
Status: DISPENSED | OUTPATIENT
Start: 2020-12-21 | End: 2020-12-22

## 2020-12-20 RX ORDER — LANOLIN ALCOHOL/MO/W.PET/CERES
3 CREAM (GRAM) TOPICAL
Status: DISCONTINUED | OUTPATIENT
Start: 2020-12-20 | End: 2021-01-06 | Stop reason: HOSPADM

## 2020-12-20 RX ORDER — TRAMADOL HYDROCHLORIDE 50 MG/1
50 TABLET ORAL EVERY 12 HOURS PRN
Status: DISCONTINUED | OUTPATIENT
Start: 2020-12-20 | End: 2021-01-06 | Stop reason: HOSPADM

## 2020-12-20 RX ORDER — OXYCODONE HYDROCHLORIDE 5 MG/1
5 TABLET ORAL EVERY 6 HOURS PRN
Status: DISCONTINUED | OUTPATIENT
Start: 2020-12-20 | End: 2021-01-06 | Stop reason: HOSPADM

## 2020-12-20 RX ORDER — SODIUM CHLORIDE 9 MG/ML
50 INJECTION, SOLUTION INTRAVENOUS CONTINUOUS
Status: DISCONTINUED | OUTPATIENT
Start: 2020-12-21 | End: 2020-12-26

## 2020-12-20 RX ORDER — SODIUM CHLORIDE 9 MG/ML
75 INJECTION, SOLUTION INTRAVENOUS CONTINUOUS
Status: DISPENSED | OUTPATIENT
Start: 2020-12-20 | End: 2020-12-21

## 2020-12-20 RX ORDER — INSULIN GLARGINE 100 [IU]/ML
15 INJECTION, SOLUTION SUBCUTANEOUS
Status: DISCONTINUED | OUTPATIENT
Start: 2020-12-20 | End: 2020-12-21

## 2020-12-20 RX ADMIN — TRAMADOL HYDROCHLORIDE 50 MG: 50 TABLET, FILM COATED ORAL at 09:53

## 2020-12-20 RX ADMIN — METRONIDAZOLE 500 MG: 500 TABLET ORAL at 08:06

## 2020-12-20 RX ADMIN — METOPROLOL SUCCINATE 100 MG: 50 TABLET, EXTENDED RELEASE ORAL at 08:07

## 2020-12-20 RX ADMIN — HEPARIN SODIUM 5000 UNITS: 5000 INJECTION INTRAVENOUS; SUBCUTANEOUS at 08:07

## 2020-12-20 RX ADMIN — INSULIN LISPRO 3 UNITS: 100 INJECTION, SOLUTION INTRAVENOUS; SUBCUTANEOUS at 21:14

## 2020-12-20 RX ADMIN — ACETAMINOPHEN 975 MG: 325 TABLET ORAL at 17:06

## 2020-12-20 RX ADMIN — METRONIDAZOLE 500 MG: 500 TABLET ORAL at 16:38

## 2020-12-20 RX ADMIN — GABAPENTIN 600 MG: 300 CAPSULE ORAL at 08:07

## 2020-12-20 RX ADMIN — DOCUSATE SODIUM AND SENNOSIDES 1 TABLET: 8.6; 5 TABLET, FILM COATED ORAL at 21:14

## 2020-12-20 RX ADMIN — ISOSORBIDE MONONITRATE 90 MG: 30 TABLET, EXTENDED RELEASE ORAL at 08:06

## 2020-12-20 RX ADMIN — SERTRALINE HYDROCHLORIDE 50 MG: 50 TABLET ORAL at 08:06

## 2020-12-20 RX ADMIN — TAMSULOSIN HYDROCHLORIDE 0.4 MG: 0.4 CAPSULE ORAL at 16:38

## 2020-12-20 RX ADMIN — LATANOPROST 1 DROP: 50 SOLUTION OPHTHALMIC at 21:14

## 2020-12-20 RX ADMIN — CEFAZOLIN SODIUM 2000 MG: 2 SOLUTION INTRAVENOUS at 08:07

## 2020-12-20 RX ADMIN — INSULIN GLARGINE 15 UNITS: 100 INJECTION, SOLUTION SUBCUTANEOUS at 21:14

## 2020-12-20 RX ADMIN — CEFAZOLIN SODIUM 2000 MG: 2 SOLUTION INTRAVENOUS at 01:37

## 2020-12-20 RX ADMIN — PANTOPRAZOLE SODIUM 40 MG: 40 TABLET, DELAYED RELEASE ORAL at 06:00

## 2020-12-20 RX ADMIN — ACETAMINOPHEN 975 MG: 325 TABLET ORAL at 11:31

## 2020-12-20 RX ADMIN — INSULIN LISPRO 1 UNITS: 100 INJECTION, SOLUTION INTRAVENOUS; SUBCUTANEOUS at 16:38

## 2020-12-20 RX ADMIN — ACETAMINOPHEN 975 MG: 325 TABLET ORAL at 01:36

## 2020-12-20 RX ADMIN — SODIUM CHLORIDE 75 ML/HR: 0.9 INJECTION, SOLUTION INTRAVENOUS at 11:34

## 2020-12-20 RX ADMIN — POLYETHYLENE GLYCOL 3350 17 G: 17 POWDER, FOR SOLUTION ORAL at 08:07

## 2020-12-20 RX ADMIN — ACETAMINOPHEN 975 MG: 325 TABLET ORAL at 06:00

## 2020-12-20 RX ADMIN — CEFEPIME HYDROCHLORIDE 1000 MG: 1 INJECTION, POWDER, FOR SOLUTION INTRAMUSCULAR; INTRAVENOUS at 14:00

## 2020-12-20 RX ADMIN — HEPARIN SODIUM 5000 UNITS: 5000 INJECTION INTRAVENOUS; SUBCUTANEOUS at 21:14

## 2020-12-20 RX ADMIN — MELATONIN 3 MG: at 03:15

## 2020-12-20 RX ADMIN — INSULIN LISPRO 1 UNITS: 100 INJECTION, SOLUTION INTRAVENOUS; SUBCUTANEOUS at 11:32

## 2020-12-20 RX ADMIN — ASPIRIN 81 MG CHEWABLE TABLET 81 MG: 81 TABLET CHEWABLE at 08:06

## 2020-12-20 RX ADMIN — FUROSEMIDE 20 MG: 20 TABLET ORAL at 08:07

## 2020-12-20 RX ADMIN — METRONIDAZOLE 500 MG: 500 TABLET ORAL at 01:36

## 2020-12-20 NOTE — CONSULTS
Consult - Cardiology   Eladia Bones 66 y o  male MRN: [id number]  Unit/Bed#: E5 -01 Encounter: [id number]          Reason For Consult:  Preoperative clearance in patient with known CAD    History Of Present Illness:  Patient is a 57-year-old with known CAD  He is followed by Dr John Javier at [facility]   He has had multiple stent procedures  Recently he had 4 stents placed in the diagonal and circumflex system in October of 2019  He had a plain balloon angioplasty to the 3rd obtuse marginal   His last echocardiogram September 2019 showed normal left ventricular function  He did complete a course of Plavix  He denies chest pain or shortness of breath  He will be having partial foot amputation tomorrow  We are asked to clear him for surgery  He does note some edema of his left lower extremity which is mild  He denies palpitations  He has had some recent syncopal episodes  He did have a patch on but was allergic to this and had to take it off  It was thought perhaps he was having drops in blood pressure  Past Medical History:   Diabetes mellitus  PVD  CAD as detailed  Hyperlipidemia with rash from statins  Hypertension  Anemia  Chronic kidney disease  Unexplained syncope  History of colonic polyps  Bladder cancer  GERD  Cholecystectomy  Gastric bypass surgery  Rotator cuff repair  Spinal fusion      Allergy:  Allergies   Allergen Reactions    Atorvastatin Hives, Itching and Rash    Simvastatin Rash and Edema     "ALL STATINS"    Statins Itching    Insulin Lispro Swelling and Edema    Medical Tape      rash to electrodes    Other Itching, Rash and Other (See Comments)     rash to electrodes and adhesives       Medications:  Xanax p r n    Aspirin 81 mg daily  Rocaltrol  Vitamin D3  Flonase  Lasix 20 mg every other day  Neurontin  Insulin  Isosorbide 90 mg daily  Metoprolol  mg daily  Omeprazole    Family History:  Diabetes, heart disease and kidney disease    Social History:  Quit smoking 35 years ago occasional alcohol use      ROS:  No TIAs  Dizziness  Exam:  113/60  Pulse 63  General:  Somewhat obese elderly male no acute distress  Head: Normocephalic, atraumatic  Eyes:  Have a pale  Sclera anicteric  Oropharynx:  Mucous membranes without erythema or exudate  Neck:  Supple with possible left carotid bruit  No JVD noted  No thyromegaly    Heart:  Regular without murmur rub or gallop  Lungs:  Clear without wheezing rhonchi rales   Abdomen: obese, normal findings: no masses palpable, no organomegaly and soft, non-tender  Lower Limbs:  Trace edema left lower extremity  Absent pulses in distal left lower extremity  Dorsalis pedal pulses 1+ on the right    Echo preliminary shows preserved left ventricular function with aortic valve sclerosis  EKG not done  Creatinine 2 86, BUN 52, potassium 4 4  Hemoglobin 9 2, white blood count 10 83, platelets a [id number]        ASSESSMENT AND PLAN:   1  CAD as detailed  Having no angina  On aspirin and beta-blocker  Continue same  2  Hypertension  Well controlled on nitrates and beta-blocker  3  Hyperlipidemia  Severe skin reaction to statins  Contemplation of using Zetia or PCS K9 inhibitors  Follows at [facility] without decision can be made  4  PVD  For surgery tomorrow  I see no contraindication for surgery and feel his risk is rather low  I have cleared him for surgery  Maintain beta-blockers perioperatively  5  Possible left carotid bruit  Could find no carotid ultrasound studies    This can certainly be performed as an outpatient    Lavetta Opitz, MD

## 2020-12-20 NOTE — ASSESSMENT & PLAN NOTE
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 fifth proximal phalangeal base  - For arterial duplex  - Management care of vascular surgery and podiatry  - ID consulted after cultures came back positive for Enterobacter and Staph aureus  Awaiting Infectious disease recommendations for antibiotic optimization  Will discontinue Flagyl for now  Results from last 7 days   Lab Units 12/18/20  1857 12/18/20  1706 12/18/20  1653   BLOOD CULTURE   --  No Growth at 24 hrs   Staphylococcus aureus*   GRAM STAIN RESULT  Rare Polys*  3+ Gram positive cocci in clusters*  --  Gram positive cocci in clusters*

## 2020-12-20 NOTE — PROGRESS NOTES
Progress Note - Vascular Surgery       Assessment:  Right 5th metatarsal osteomyelitis  CKD  HTN  PAD    Plan:  Awaiting Formal lower extremity duplex    Outside ([facility]) duplex would  suggest adequate healing potential based on reported pressures  Ok to proceed with ray resection  Will follow along closely post operatively and if needed will proceed with arteriogram, possible intervention  ______________________________________________________________________    Subjective:  Patient offers no new complaints  Vitals:  /58 (BP Location: Right arm)   Pulse 72   Temp 98 6 °F (37 °C) (Temporal)   Resp 20   SpO2 97%     I/Os:  I/O last 3 completed shifts: In: 520 [P O :520]  Out: -   I/O this shift:   In: 780 [P O :780]  Out: 650 [Urine:650]    Lab Results and Cultures:   CBC with diff:   Lab Results   Component Value Date    WBC 10 83 (H) 12/20/2020    HGB 9 2 (L) 12/20/2020    HCT 28 0 (L) 12/20/2020    MCV 93 12/20/2020     (L) 12/20/2020    MCH 30 7 12/20/2020    MCHC 32 9 12/20/2020    RDW 13 2 12/20/2020    MPV 10 8 12/20/2020    NRBC 0 12/18/2020   ,   BMP/CMP:  Lab Results   Component Value Date     09/03/2015    K 4 4 12/20/2020    K 4 5 09/03/2015     12/20/2020     09/03/2015    CO2 24 12/20/2020    CO2 28 6 09/03/2015    ANIONGAP 5 09/03/2015    BUN 52 (H) 12/20/2020    BUN 19 09/03/2015    CREATININE 2 86 (H) 12/20/2020    CREATININE 1 37 (H) 09/03/2015    GLUCOSE 203 (H) 09/03/2015    CALCIUM 8 3 12/20/2020    CALCIUM 9 0 09/03/2015    AST 22 12/18/2020    AST 56 (H) 07/26/2015    ALT 61 12/18/2020    ALT 84 (H) 07/26/2015    ALKPHOS 115 12/18/2020    ALKPHOS 93 07/26/2015    PROT 6 4 07/26/2015    BILITOT 0 50 07/26/2015    EGFR 23 12/20/2020   ,   Lipid Panel:   Lab Results   Component Value Date    CHOL 139 03/17/2015   ,   Coags:   Lab Results   Component Value Date    PTT 30 09/19/2018    PTT 32 09/03/2015    INR 0 98 02/03/2019    INR 0 93 09/03/2015   , Blood Culture:   Lab Results   Component Value Date    BLOODCX No Growth at 24 hrs  12/18/2020   ,   Urinalysis:   Lab Results   Component Value Date    COLORU Yellow 08/17/2020    COLORU Yellow 07/18/2016    CLARITYU Cloudy 08/17/2020    CLARITYU Transparent 07/18/2016    SPECGRAV 1 018 08/17/2020    SPECGRAV 1 020 07/18/2016    PHUR 6 0 08/17/2020    PHUR 5 5 06/29/2020    PHUR 6 0 07/18/2016    LEUKOCYTESUR Large (A) 08/17/2020    LEUKOCYTESUR - 07/18/2016    NITRITE Negative 08/17/2020    NITRITE - 07/18/2016    PROTEINUA - 07/18/2016    GLUCOSEU Negative 08/17/2020    GLUCOSEU 500 (1/2%) (A) 09/24/2015    KETONESU Negative 08/17/2020    KETONESU - 07/18/2016    BILIRUBINUR Negative 08/17/2020    BILIRUBINUR - 01/18/2016    BLOODU Moderate (A) 08/17/2020    BLOODU trace 07/18/2016   ,   Urine Culture:   Lab Results   Component Value Date    URINECX No Growth <1000 cfu/mL 11/16/2020   ,   Wound Culure:   Lab Results   Component Value Date    WOUNDCULT 2+ Growth of Enterobacter aerogenes (A) 12/18/2020    WOUNDCULT 4+ Growth of Staphylococcus aureus (A) 12/18/2020       Medications:  Current Facility-Administered Medications   Medication Dose Route Frequency    acetaminophen (TYLENOL) tablet 975 mg  975 mg Oral Q6H [address]    ALPRAZolam (XANAX) tablet 0 25 mg  0 25 mg Oral HS PRN    aspirin chewable tablet 81 mg  81 mg Oral Daily    cefepime (MAXIPIME) 1,000 mg in dextrose 5 % 50 mL IVPB  1,000 mg Intravenous Q12H    fluticasone (FLONASE) 50 mcg/act nasal spray 1 spray  1 spray Each Nare Daily    gabapentin (NEURONTIN) capsule 600 mg  600 mg Oral Daily    heparin (porcine) subcutaneous injection 5,000 Units  5,000 Units Subcutaneous Q12H [address]    insulin glargine (LANTUS) subcutaneous injection 15 Units 0 15 mL  15 Units Subcutaneous HS    insulin lispro (HumaLOG) 100 units/mL subcutaneous injection 1-5 Units  1-5 Units Subcutaneous 4x Daily (AC & HS)    isosorbide mononitrate (IMDUR) 24 hr tablet 90 mg 90 mg Oral Daily    latanoprost (XALATAN) 0 005 % ophthalmic solution 1 drop  1 drop Both Eyes HS    melatonin tablet 3 mg  3 mg Oral HS PRN    metoprolol succinate (TOPROL-XL) 24 hr tablet 100 mg  100 mg Oral Daily    oxyCODONE (ROXICODONE) IR tablet 5 mg  5 mg Oral Q6H PRN    pantoprazole (PROTONIX) EC tablet 40 mg  40 mg Oral Early Morning    polyethylene glycol (MIRALAX) packet 17 g  17 g Oral Daily    senna-docusate sodium (SENOKOT S) 8 6-50 mg per tablet 1 tablet  1 tablet Oral HS    sertraline (ZOLOFT) tablet 50 mg  50 mg Oral Daily    sodium chloride 0 9 % infusion  75 mL/hr Intravenous Continuous    tamsulosin (FLOMAX) capsule 0 4 mg  0 4 mg Oral Daily With Dinner    traMADol (ULTRAM) tablet 50 mg  50 mg Oral Q12H PRN       Imaging:  Lower extremity duplex pending    Physical Exam:    General: AAO x 3  CV: regular  Respiratory: nonlabored breathing  Abdominal: NT/ND  Extremities: warm well perfused; nonpalp pedal pulses  Neurologic: grossly intact    Wound/Incision:          Pulse exam:  Radial: Right: 2+ Left[de-identified] 2+  Femoral: Right: 1+ Left: 1+  Popliteal: Right: 1+ Left: 1+  DP: Right: non-palpable Left: non-palpable  PT: Right: non-palpable Left: non-palpable    Indio Day DO  12/20/2020  [facility]: [phone]

## 2020-12-20 NOTE — PROGRESS NOTES
Progress Note - Podiatry  Gurdeep Oh 66 y o  male MRN: [id number]  Unit/Bed#: E5 -01 Encounter: [id number]    Assessment:  66 y o  male left foot cellulitis and underlying osteomyelitis of 5th metatarsal  - probes to bone with mild purulent drainage and cellulitis on the dorsal foot     1  Left foot osteomyelitis of 5th metatarsal  2  Left foot cellulitis  3  Insulin Dependent Diabetes mellitus  4  Coronary arterial disease  5  Peripheral arterial disease  6  Hypertension    Plan:   Local wound care performed today consisting of 1/4in iodoform packing, DSD   Plan to take patient to OR tomorrow, 12/21/2020 for partial 5th ray amputation left foot   Plan discussed with vascular surgery, okay to proceed with ray amputation   Patient is amenable to tomorrow's procedure, consent signed and placed in chart, patient understands all risks and complications   MRI reviewed by myself:  Osteomyelitis left 5th digit and 5th metatarsal head   LEADS pending   Patient NPO midnight   Plan was discussed with Dr Anton Manning    Weight bearing status:  Weight-bearing as tolerated  DVT prophylaxis:  Heparin subcutaneous  Antibiotics:  IV cefepime for coverage of MSSA and Enterobacter, continue Flagyl as well  Disposition: pending surgical intervention      Subjective/Objective   Chief Complaint: No chief complaint on file  Subjective: 66 y o  male was seen and evaluated at bedside  Patient denies pain to his left foot today  Patient denies nausea, vomiting, chest pain, shortness of breath, chills, fever  Blood pressure 120/58, pulse 72, temperature 98 6 °F (37 °C), temperature source Temporal, resp  rate 20, SpO2 97 %  There is no height or weight on file to calculate BMI  Physical Exam:   General: Alert, cooperative and no distress  Lower Extremities: Neurovascular status at baseline bilaterally, musculoskeletal function at baseline bilaterally, no calf tenderness bilaterally      Wound #: 1  Location: Left foot sub met 5 head  Length 0 4cm: Width 0 4cm: Depth 2 5cm:   Deepest Tissue Noted in Base:  Bone  Probe to Bone: Yes  Peripheral Skin Description: Attached  Granulation: 0% Fibrotic Tissue: 100% Necrotic Tissue: 0%   Drainage Amount: minimal, serosanguinous  Signs of Infection: Yes       Lab, Imaging and other studies:   Results from last 7 days   Lab Units 12/20/20  0607  12/18/20  1705   WBC Thousand/uL 10 83*   < > 11 15*   HEMOGLOBIN g/dL 9 2*   < > 10 9*   HEMATOCRIT % 28 0*   < > 33 7*   PLATELETS Thousands/uL 127*   < > 157   NEUTROS PCT %  --   --  74   LYMPHS PCT %  --   --  16   MONOS PCT %  --   --  9   EOS PCT %  --   --  1    < > = values in this interval not displayed  Results from last 7 days   Lab Units 12/20/20  0607  12/18/20  1705   POTASSIUM mmol/L 4 4   < > 5 0   CHLORIDE mmol/L 105   < > 106   CO2 mmol/L 24   < > 27   BUN mg/dL 52*   < > 50*   CREATININE mg/dL 2 86*   < > 2 47*   CALCIUM mg/dL 8 3   < > 8 9   ALK PHOS U/L  --   --  115   ALT U/L  --   --  61   AST U/L  --   --  22    < > = values in this interval not displayed  Results from last 7 days   Lab Units 12/18/20  1857 12/18/20  1706 12/18/20  1653   BLOOD CULTURE   --  No Growth at 24 hrs  Staphylococcus aureus*   GRAM STAIN RESULT  Rare Polys*  3+ Gram positive cocci in clusters*  --  Gram positive cocci in clusters*   WOUND CULTURE  2+ Growth of Enterobacter aerogenes*  4+ Growth of Staphylococcus aureus*  --   --      Results from last 7 days   Lab Units 12/18/20  1857   ANAEROBIC CULTURE  Culture results to follow  Imaging: I have personally reviewed pertinent films and reports in PACS  EKG, Pathology, and Other Studies: I have personally reviewed pertinent reports  VTE Pharmacologic Prophylaxis: Heparin    Portions of the record may have been created with voice recognition software   Occasional wrong word or "sound a like" substitutions may have occurred due to the inherent limitations of voice recognition software  Read the chart carefully and recognize, using context, where substitutions have occurred      Beatrice Saldivar DPM

## 2020-12-20 NOTE — ASSESSMENT & PLAN NOTE
GARY on CKD  Possibly prerenal   - Hold lasix  - Gentle hydration  Urine studies   - Might require renal evaluation if vascular intervention is planned  - Renally dose medications      Recent Labs     12/18/20  1705 12/19/20  0520 12/20/20  0607   BUN 50* 49* 52*   CREATININE 2 47* 2 42* 2 86*   EGFR 28 29 23       Results from last 7 days   Lab Units 12/19/20  0852   CREATININE UR mg/dL 108 0

## 2020-12-20 NOTE — PROGRESS NOTES
Progress Note - Valerie Simpson 1945, 66 y o  male MRN: [id number]    Unit/Bed#: E5 -01 Encounter: [id number]    Primary Care Provider: Dallin Bowden MD   Date and time admitted to hospital: 12/18/2020 11:52 AM        * Subacute osteomyelitis of left foot [facility])  Assessment & Plan  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 fifth proximal phalangeal base  - For arterial duplex  - Management care of vascular surgery and podiatry  - ID consulted after cultures came back positive for Enterobacter and Staph aureus    - ID recommending cefepime and flagyl for now  Results from last 7 days   Lab Units 12/18/20  1857 12/18/20  1706 12/18/20  1653   BLOOD CULTURE   --  No Growth at 24 hrs  Staphylococcus aureus*   GRAM STAIN RESULT  Rare Polys*  3+ Gram positive cocci in clusters*  --  Gram positive cocci in clusters*               Anemia of chronic disease  Assessment & Plan  Not evidence of acute blood loss  Continue monitoring  Seems to be due to iron deficiency anemia > Chronic disease  - Outpatient PCP followup for possible colonoscopy if indicated    Recent Labs     12/18/20  1705 12/19/20  0520 12/20/20  0607   HGB 10 9* 10 6* 9 2*   MCV 93 92 93   RDW 13 2 13 2 13 2   IRON  --  39*  --    TIBC  --  385  --    FERRITIN  --  52  --          Acute kidney injury superimposed on chronic kidney disease (HCC)  Assessment & Plan  GARY on CKD  Possibly prerenal   - Hold lasix  - Gentle hydration  Urine studies   - Might require renal evaluation if vascular intervention is planned  - Renally dose medications      Recent Labs     12/18/20  1705 12/19/20  0520 12/20/20  0607   BUN 50* 49* 52*   CREATININE 2 47* 2 42* 2 86*   EGFR 28 29 23       Results from last 7 days   Lab Units 12/19/20  0852   CREATININE UR mg/dL 108 0         CAD (coronary artery disease)  Assessment & Plan  Continue aspirin, metoprolol and Imdur    Type 2 diabetes mellitus, with long-term current use of insulin [facility])  Assessment & Plan  Lab Results   Component Value Date    HGBA1C 7 4 (H) 2020       Recent Labs     20  1100 20  1530 20  2119 20  0712   POCGLU 223* 177* 159* 103       Blood Sugar Average: Last 72 hrs:  (P) 147 [id number]     Home Regimen is 27U HS and 10-12U TID AC  - While on diabetic diet, discontinue TID dosing for now  - Continue home regimen at 15U HS to prevent hypoglycemia  Essential hypertension  Assessment & Plan  Continue metoprolol  Lasix held due to GARY    Hyperlipidemia  Assessment & Plan  Diet controlled      VTE Pharmacologic Prophylaxis:   Pharmacologic: Heparin  Mechanical VTE Prophylaxis in Place: Yes    Patient Centered Rounds: I have performed bedside rounds with nursing staff today  Discussions with Specialists or Other Care Team Provider: Nursing    Education and Discussions with Family / Patient: patient    Time Spent for Care: 30 minutes  More than 50% of total time spent on counseling and coordination of care as described above  Current Length of Stay: 2 day(s)    Current Patient Status: Inpatient   Certification Statement: The patient will continue to require additional inpatient hospital stay due to IV antibiotics, arterial duplex, cardiology/id eval, active    Discharge Plan: active    Code Status: Level 1 - Full Code      Subjective:   Patient seen and examined at bedside  He was complaining of pain this morning, but did not want the full oxycodone 10mg due to being "too strong " ordered tramadol and decreased oxy to 5mg for severe  Objective:     Vitals:   Temp (24hrs), Av 5 °F (36 9 °C), Min:97 6 °F (36 4 °C), Max:99 4 °F (37 4 °C)    Temp:  [97 6 °F (36 4 °C)-99 4 °F (37 4 °C)] 98 6 °F (37 °C)  HR:  [64-72] 72  Resp:  [17-20] 20  BP: (117-124)/(0-62) 120/58  SpO2:  [97 %-99 %] 97 %  There is no height or weight on file to calculate BMI       Input and Output Summary (last 24 hours): Intake/Output Summary (Last 24 hours) at 12/20/2020 1017  Last data filed at 12/20/2020 3582  Gross per 24 hour   Intake 900 ml   Output 250 ml   Net 650 ml       Physical Exam:     Physical Exam  Vitals signs reviewed  HENT:      Head: Normocephalic  Nose: Nose normal       Mouth/Throat:      Mouth: Mucous membranes are moist    Eyes:      General: No scleral icterus  Extraocular Movements: Extraocular movements intact  Cardiovascular:      Rate and Rhythm: Normal rate and regular rhythm  Heart sounds: No murmur  Pulmonary:      Effort: Pulmonary effort is normal  No respiratory distress  Abdominal:      General: There is no distension  Palpations: Abdomen is soft  Tenderness: There is no abdominal tenderness  Musculoskeletal:      Comments: Left foot dressing, images reviewed   Skin:     General: Skin is warm  Neurological:      Mental Status: He is alert  Mental status is at baseline  Psychiatric:         Mood and Affect: Mood normal          Behavior: Behavior normal        Additional Data:     Labs:    Results from last 7 days   Lab Units 12/20/20  0607  12/18/20  1705   WBC Thousand/uL 10 83*   < > 11 15*   HEMOGLOBIN g/dL 9 2*   < > 10 9*   HEMATOCRIT % 28 0*   < > 33 7*   PLATELETS Thousands/uL 127*   < > 157   NEUTROS PCT %  --   --  74   LYMPHS PCT %  --   --  16   MONOS PCT %  --   --  9   EOS PCT %  --   --  1    < > = values in this interval not displayed       Results from last 7 days   Lab Units 12/20/20  0607  12/18/20  1705   SODIUM mmol/L 136   < > 140   POTASSIUM mmol/L 4 4   < > 5 0   CHLORIDE mmol/L 105   < > 106   CO2 mmol/L 24   < > 27   BUN mg/dL 52*   < > 50*   CREATININE mg/dL 2 86*   < > 2 47*   ANION GAP mmol/L 7   < > 7   CALCIUM mg/dL 8 3   < > 8 9   ALBUMIN g/dL  --   --  3 5   TOTAL BILIRUBIN mg/dL  --   --  0 46   ALK PHOS U/L  --   --  115   ALT U/L  --   --  61   AST U/L  --   --  22   GLUCOSE RANDOM mg/dL 102   < > 121    < > = values in this interval not displayed  Results from last 7 days   Lab Units 12/20/20  0712 12/19/20  2119 12/19/20  1530 12/19/20  1100 12/19/20  0720 12/18/20  2128 12/18/20  1637   POC GLUCOSE mg/dl 103 159* 177* 223* 107 141* 121     Results from last 7 days   Lab Units 12/18/20  1652   HEMOGLOBIN A1C % 7 4*               * I Have Reviewed All Lab Data Listed Above  * Additional Pertinent Lab Tests Reviewed: Sergio 64 Admission Reviewed    Imaging:    Imaging Reports Reviewed Today Include: mri foot    Recent Cultures (last 7 days):     Results from last 7 days   Lab Units 12/18/20  1857 12/18/20  1706 12/18/20  1653   BLOOD CULTURE   --  No Growth at 24 hrs   Staphylococcus aureus*   GRAM STAIN RESULT  Rare Polys*  3+ Gram positive cocci in clusters*  --  Gram positive cocci in clusters*   WOUND CULTURE  2+ Growth of Enterobacter aerogenes*  4+ Growth of Staphylococcus aureus*  --   --        Last 24 Hours Medication List:   Current Facility-Administered Medications   Medication Dose Route Frequency Provider Last Rate    acetaminophen  975 mg Oral Q6H [address] Toy Putt, DPM      ALPRAZolam  0 25 mg Oral HS PRN Toy Putt, DPM      aspirin  81 mg Oral Daily Toy Putt, DPM      cefazolin  2,000 mg Intravenous Q8H Toy Putt, DPM 2,000 mg (12/20/20 0807)    fluticasone  1 spray Each Nare Daily Toy Putt, DPM      gabapentin  600 mg Oral Daily Toy Putt, DPM      heparin (porcine)  5,000 Units Subcutaneous Q12H [address] Toy Putt, DPM      insulin glargine  15 Units Subcutaneous HS Faustino Blount MD      insulin lispro  1-5 Units Subcutaneous 4x Daily (AC & HS) Ashley Salinas PA-C      isosorbide mononitrate  90 mg Oral Daily Toy Putt, DPM      latanoprost  1 drop Both Eyes HS Toy Putt, DPM      melatonin  3 mg Oral HS PRN Jeanine Avendano PA-C      metoprolol succinate  100 mg Oral Daily Toy Putt, DPM      oxyCODONE  5 mg Oral Q6H PRN Faustino Blount MD      pantoprazole  40 mg Oral Early Morning [place], DPM      polyethylene glycol  17 g Oral Daily Chaz Bethea, DEJUAN      senna-docusate sodium  1 tablet Oral HS Chaz Bethea, DEJUAN      sertraline  50 mg Oral Daily Chaz Bethea, DEJUAN      sodium chloride  75 mL/hr Intravenous Continuous Dawna Ng MD      tamsulosin  0 4 mg Oral Daily With [facility], DPM      traMADol  50 mg Oral Q12H PRN Dawna Ng MD      vancomycin  15 mg/kg Intravenous Q24H Dawna Ng MD 1,750 mg (12/19/20 1329)        Today, Patient Was Seen By: Edelmira Hoffman MD    ** Please Note: Dictation voice to text software may have been used in the creation of this document   **

## 2020-12-20 NOTE — CONSULTS
Vancomycin has been d/c'd by ID  Pharmacy will sign off as consultants for vancomycin management  Thank you

## 2020-12-20 NOTE — ASSESSMENT & PLAN NOTE
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 fifth proximal phalangeal base  - s/p RAY RESECTION FOOT (Left) by Podiatry 12/21/2020  - vascular surgery following  - ID consulted after cultures came back positive for Enterobacter and Staph aureus with blood cultures positive for MSSA   - Currently on Cefepime and Flagyl as per ID - per ID can likely simplify antibiotics from cefepime to cefazolin tomorrow, 12/22/2020    Results from last 7 days   Lab Units 12/18/20  1857 12/18/20  1706 12/18/20  1653   BLOOD CULTURE   --  No Growth at 24 hrs   Staphylococcus aureus*   GRAM STAIN RESULT  Rare Polys*  3+ Gram positive cocci in clusters*  --  Gram positive cocci in clusters*

## 2020-12-20 NOTE — ASSESSMENT & PLAN NOTE
Lab Results   Component Value Date    HGBA1C 7 4 (H) 12/18/2020       Recent Labs     12/19/20  1100 12/19/20  1530 12/19/20  2119 12/20/20  0712   POCGLU 223* 177* 159* 103       Blood Sugar Average: Last 72 hrs:  (P) 147 [id number]     Home Regimen is 27U HS and 10-12U TID AC  - While on diabetic diet, discontinue TID dosing for now  - Continue home regimen at 15U HS to prevent hypoglycemia

## 2020-12-20 NOTE — ASSESSMENT & PLAN NOTE
Not evidence of acute blood loss  Continue monitoring   Seems to be due to iron deficiency anemia > Chronic disease  - Outpatient PCP followup for possible colonoscopy if indicated    Recent Labs     12/18/20  1705 12/19/20  0520 12/20/20  0607   HGB 10 9* 10 6* 9 2*   MCV 93 92 93   RDW 13 2 13 2 13 2   IRON  --  39*  --    TIBC  --  385  --    FERRITIN  --  52  --

## 2020-12-20 NOTE — CONSULTS
Consultation - Infectious Disease   Nava Finder 66 y o  male MRN: [id number]  Unit/Bed#: E5 -01 Encounter: [id number]      IMPRESSION & RECOMMENDATIONS:   1  Staphylococcus aureus bacteremia-with only 1 of 2 sets positive  While this could represent a contaminant, the patient had the same organism isolated in the foot wound which has been worsening, therefore I suspect this is more of a transient bacteremia  As the wound culture is growing MSSA, this likely represents an MSSA bacteremia  Consideration for the possibility of endocarditis although I doubt  Fortunately the patient is not systemically ill, is hemodynamically stable  He seems to be tolerating the antibiotics without difficulty  -will change antibiotics to below  -recheck blood cultures x2 sets to confirm clearance of bacteremia  -follow-up final sensitivities and adjust antibiotics as needed  -check transthoracic echocardiogram  -additional workup as needed    2  Left foot osteomyelitis-involving primarily the 5th metatarsal but also the phalanx  Infection appears to be polymicrobial although I suspect that MSSA is the primary invasive organism  Fortunately the infection seems to be fairly well localized   -discontinue cefazolin  -begin cefepime 1 g IV q 12 hours to cover both the MSSA and Enterobacter  -discontinue vancomycin  -continue Flagyl  -follow-up vascular studies and await vascular input as far as additional manage  -patient possibly to the OR tomorrow to undergo 5th ray amputation  -likely simplify antibiotics back to cefazolin 12/22/2020  -recheck CBC with diff and BMP  -close podiatry follow-up  -serial exams    3  Chronic kidney disease-advanced  Renal function is a bit worse today  Possibly a pre renal issue   -dose adjusted antibiotics as above  -volume management  -recheck BMP    4  Chronic dysuria-with positive urine culture for BCG as expected post treatment for bladder cancer    The patient is now status post biopsy with mild chronic inflammation but AFB smears negative for invasive disease  Culture from the biopsies negative thus far  -follow-up AFB cultures  -recheck AFB cultures  -follow-up with Urology and in the Infectious Disease office    Have discussed the above management plan in detail with the primary service    Extensive review of the medical records in epic including review of the notes, radiographs, and laboratory results     HISTORY OF PRESENT ILLNESS:  Reason for Consult:  Staphylococcus aureus bacteremia  HPI: Yefri Campso is a 66y o  year old male with diabetes mellitus, chronic kidney disease, bladder cancer, admitted to [facility] with worsening left foot redness swelling and drainage in the setting of a chronic ulceration who I am asked to assist with management of osteomyelitis and bacteremia  Patient has had a history of bladder cancer and is undergoing BCG treatment in the past   He has had persistently positive urine cultures for AFB and has been seen in the Infectious Disease office by Dr Thuy Camejo for management of the problem  He has had some chronic dysuria but otherwise systemically well  He has undergone a bladder biopsy in November to look for evidence of invasive disease  He has struggled with a left submetatarsal ulceration over the past several months which in recent days has become much worse  Because of the worsening foot findings, the patient was admitted for further management  He underwent x-ray of the foot that revealed evidence of osteomyelitis  On presentation, the patient was found to be afebrile and hemodynamically stable  He underwent blood cultures and wound cultures and was started on cefazolin and Flagyl and admitted for further management  His blood cultures have now grown Staphylococcus aureus  His wound cultures are also growing Staphylococcus aureus as well as Enterobacter    During the patient's brief hospital stay he has remained afebrile and hemodynamically stable  The patient denies having any recent fever chills or sweats, denies any nausea vomiting or diarrhea, denies any cough or shortness of breath, denies any hematuria, but does admit to having chronic dysuria although this seems to be improving  REVIEW OF SYSTEMS:  A complete review of systems is negative other than that noted in the HPI  PAST MEDICAL HISTORY:  Past Medical History:   Diagnosis Date    Anemia     Last assessed: 9/28/17    Arteriosclerotic cardiovascular disease     Last assessed: 9/28/17    Bladder cancer ([place] [id number] )     bladder    CAD (coronary artery disease)     Chronic kidney disease     CKD (chronic kidney disease) stage 4, GFR 15-29 ml/min ([facility])     Colon polyp     Diabetes mellitus ([place] [address] )     Foot ulcer ([place] [address] ) 09/2020    left foot- treated with antibiotics, followed by podiatrist    GERD (gastroesophageal reflux disease)     Hypertension     Hypotension     Last assessed: 2/24/15    Insomnia     Last assessed: 11/14/12    Loss of hearing     has hearing aids but usually does not wear them    Other seasonal allergic rhinitis     Last assessed: 2/10/16    Shortness of breath     Transient cerebral ischemia     Wears glasses      Past Surgical History:   Procedure Laterality Date    CARDIAC SURGERY      Cath stent placement  Last assessed: 3/9/17  Interventional Catheterization    CHOLECYSTECTOMY      COLONOSCOPY      CYSTOSCOPY      Diagnostic w/biopsy  Jenny Pass  Last assessed: 12/1/14    CYSTOURETHROSCOPY      w/cautery  Jenny Pass    GASTRIC BYPASS      For morbid obesity w/Shaji-en-Y   Resolved: 11/17/09    INCISION AND DRAINAGE OF WOUND Right 2/26/2017    Procedure: INCISION AND DRAINAGE (I&D) EXTREMITY WITH APPLICATION OF GRAFT JACKET;  Surgeon: Jovanni Granados DPM;  [facility];  Service:     INCISION AND DRAINAGE OF WOUND Right 4/25/2017    Procedure: INCISION AND DRAINAGE (I&D) EXTREMITY, APPLICATION OF GRAFT;  Surgeon: Bozena Bell DPM;  [facility];  Service:     IR BIOPSY OTHER  2020    JOINT REPLACEMENT      Knee  Last assessed: 11    UT CYSTOURETHROSCOPY,BIOPSY N/A 2016    Procedure: Reilly Stevens;  Surgeon: Pardeep Vazquez MD;  [facility];  Service: Urology    UT CYSTOURETHROSCOPY,FULGUR <0 5 CM LESN N/A 2020    Procedure: CYSTO W/BIOPSIES, transurethral prostate bx;  Surgeon: Cheo Michel MD;  [facility];  Service: Urology    ROTATOR CUFF REPAIR      SMALL INTESTINE SURGERY      Surgery Shaji-en-Y    SPINAL FUSION      lumbar and cervical fusions    VAC DRESSING APPLICATION Right     Procedure: APPLICATION VAC DRESSING;  Surgeon: Bozena Bell DPM;  [facility];  Service:        FAMILY HISTORY:  Non-contributory    SOCIAL HISTORY:  Social History   Social History     Substance and Sexual Activity   Alcohol Use Yes    Frequency: 2-3 times a week    Drinks per session: 1 or 2    Binge frequency: Never     Social History     Substance and Sexual Activity   Drug Use Not Currently    Types: Marijuana    Comment: quit 2019 had medical marijuana     Social History     Tobacco Use   Smoking Status Former Smoker    Quit date: 1970    Years since quittin 0   Smokeless Tobacco Never Used       ALLERGIES:  Allergies   Allergen Reactions    Atorvastatin Hives, Itching and Rash    Simvastatin Rash and Edema     "ALL STATINS"    Statins Itching    Insulin Lispro Swelling and Edema    Medical Tape      rash to electrodes    Other Itching, Rash and Other (See Comments)     rash to electrodes and adhesives       MEDICATIONS:  All current active medications have been reviewed    Antibiotics:  Cefazolin, Flagyl 3, vancomycin 2    PHYSICAL EXAM:  Temp:  [97 6 °F (36 4 °C)-99 4 °F (37 4 °C)] 98 6 °F (37 °C)  HR:  [64-72] 72  Resp:  [17-20] 20  BP: (117-124)/(0-62) 120/58  SpO2:  [97 %-99 %] 97 %  Temp (24hrs), Av 5 °F (36 9 °C), Min:97 6 °F (36 4 °C), Max:99 4 °F (37 4 °C)  Current: Temperature: 98 6 °F (37 °C)    Intake/Output Summary (Last 24 hours) at 2020 1212  Last data filed at 2020 1116  Gross per 24 hour   Intake 900 ml   Output 650 ml   Net 250 ml       General Appearance:  Appearing well, nontoxic, and in no distress   Head:  Normocephalic, without obvious abnormality, atraumatic   Eyes:  Conjunctiva pale and sclera anicteric, both eyes   Nose: Nares normal, mucosa normal, no drainage   Throat: Oropharynx moist without lesions   Neck: Supple, symmetrical, no adenopathy, no tenderness/mass/nodules   Back:   Symmetric, no curvature, ROM normal, no CVA tenderness   Lungs:   Clear to auscultation bilaterally, respirations unlabored   Chest Wall:  No tenderness or deformity   Heart:  RRR; no murmur, rub or gallop   Abdomen:   Soft, non-tender, non-distended, positive bowel sounds    Extremities: No cyanosis, clubbing  Left foot dressed w with some scant drainage noted on the dressing  Fifth submetatarsal ulceration with some turbid drainage   Skin: No rashes or lesions  No draining wounds noted  Lymph nodes: Cervical, supraclavicular nodes normal   Neurologic: Alert and oriented times 3, extremity strength 5/5 and symmetric       LABS, IMAGING, & OTHER STUDIES:  Lab Results:  I have personally reviewed pertinent labs    Results from last 7 days   Lab Units 20  0607 20  0520 20  1705   WBC Thousand/uL 10 83* 9 79 11 15*   HEMOGLOBIN g/dL 9 2* 10 6* 10 9*   PLATELETS Thousands/uL 127* 149 157     Results from last 7 days   Lab Units 20  0607 20  0520 20  1705   SODIUM mmol/L 136 137 140   POTASSIUM mmol/L 4 4 4 6 5 0   CHLORIDE mmol/L 105 105 106   CO2 mmol/L 24 23 27   BUN mg/dL 52* 49* 50*   CREATININE mg/dL 2 86* 2 42* 2 47*   EGFR ml/min/1 73sq m 23 29 28   CALCIUM mg/dL 8 3 8 7 8 9   AST U/L  --   --  22   ALT U/L  --   --  61   ALK PHOS U/L  --   --  115 Results from last 7 days   Lab Units 12/18/20  1857 12/18/20  1706 12/18/20  1653   BLOOD CULTURE   --  No Growth at 24 hrs  Staphylococcus aureus*   GRAM STAIN RESULT  Rare Polys*  3+ Gram positive cocci in clusters*  --  Gram positive cocci in clusters*   WOUND CULTURE  2+ Growth of Enterobacter aerogenes*  4+ Growth of Staphylococcus aureus*  --   --              Results from last 7 days   Lab Units 12/19/20  0520   FERRITIN ng/mL 52           Imaging Studies:     MRI left foot-5th metatarsal head osteomyelitis  Abscess in the region    Fifth proximal phalangeal osteomyelitis    Images personally reviewed by me in PACS

## 2020-12-21 ENCOUNTER — APPOINTMENT (INPATIENT)
Dept: NON INVASIVE DIAGNOSTICS | Facility: HOSPITAL | Age: 75
DRG: 617 | End: 2020-12-21
Payer: MEDICARE

## 2020-12-21 ENCOUNTER — APPOINTMENT (INPATIENT)
Dept: RADIOLOGY | Facility: HOSPITAL | Age: 75
DRG: 617 | End: 2020-12-21
Payer: MEDICARE

## 2020-12-21 ENCOUNTER — ANESTHESIA (INPATIENT)
Dept: PERIOP | Facility: HOSPITAL | Age: 75
DRG: 617 | End: 2020-12-21
Payer: MEDICARE

## 2020-12-21 VITALS — HEART RATE: 63 BPM

## 2020-12-21 PROBLEM — B95.61 MSSA BACTEREMIA: Status: ACTIVE | Noted: 2020-12-21

## 2020-12-21 PROBLEM — R78.81 MSSA BACTEREMIA: Status: ACTIVE | Noted: 2020-12-21

## 2020-12-21 LAB
ANION GAP SERPL CALCULATED.3IONS-SCNC: 9 MMOL/L (ref 4–13)
BACTERIA BLD CULT: ABNORMAL
BASOPHILS # BLD AUTO: 0.03 THOUSANDS/ΜL (ref 0–0.1)
BASOPHILS NFR BLD AUTO: 0 % (ref 0–1)
BUN SERPL-MCNC: 54 MG/DL (ref 5–25)
CALCIUM SERPL-MCNC: 8.7 MG/DL (ref 8.3–10.1)
CHLORIDE SERPL-SCNC: 104 MMOL/L (ref 100–108)
CO2 SERPL-SCNC: 23 MMOL/L (ref 21–32)
CREAT SERPL-MCNC: 2.67 MG/DL (ref 0.6–1.3)
EOSINOPHIL # BLD AUTO: 0.17 THOUSAND/ΜL (ref 0–0.61)
EOSINOPHIL NFR BLD AUTO: 1 % (ref 0–6)
ERYTHROCYTE [DISTWIDTH] IN BLOOD BY AUTOMATED COUNT: 13.3 % (ref 11.6–15.1)
GFR SERPL CREATININE-BSD FRML MDRD: 25 ML/MIN/1.73SQ M
GLUCOSE SERPL-MCNC: 119 MG/DL (ref 65–140)
GLUCOSE SERPL-MCNC: 119 MG/DL (ref 65–140)
GLUCOSE SERPL-MCNC: 130 MG/DL (ref 65–140)
GLUCOSE SERPL-MCNC: 265 MG/DL (ref 65–140)
GRAM STN SPEC: ABNORMAL
HCT VFR BLD AUTO: 30.6 % (ref 36.5–49.3)
HGB BLD-MCNC: 10.1 G/DL (ref 12–17)
IMM GRANULOCYTES # BLD AUTO: 0.06 THOUSAND/UL (ref 0–0.2)
IMM GRANULOCYTES NFR BLD AUTO: 1 % (ref 0–2)
LYMPHOCYTES # BLD AUTO: 1.14 THOUSANDS/ΜL (ref 0.6–4.47)
LYMPHOCYTES NFR BLD AUTO: 10 % (ref 14–44)
MCH RBC QN AUTO: 30.8 PG (ref 26.8–34.3)
MCHC RBC AUTO-ENTMCNC: 33 G/DL (ref 31.4–37.4)
MCV RBC AUTO: 93 FL (ref 82–98)
MONOCYTES # BLD AUTO: 1 THOUSAND/ΜL (ref 0.17–1.22)
MONOCYTES NFR BLD AUTO: 8 % (ref 4–12)
NEUTROPHILS # BLD AUTO: 9.59 THOUSANDS/ΜL (ref 1.85–7.62)
NEUTS SEG NFR BLD AUTO: 80 % (ref 43–75)
NRBC BLD AUTO-RTO: 0 /100 WBCS
PLATELET # BLD AUTO: 143 THOUSANDS/UL (ref 149–390)
PMV BLD AUTO: 10.2 FL (ref 8.9–12.7)
POTASSIUM SERPL-SCNC: 4.5 MMOL/L (ref 3.5–5.3)
RBC # BLD AUTO: 3.28 MILLION/UL (ref 3.88–5.62)
SODIUM SERPL-SCNC: 136 MMOL/L (ref 136–145)
WBC # BLD AUTO: 11.99 THOUSAND/UL (ref 4.31–10.16)

## 2020-12-21 PROCEDURE — 85025 COMPLETE CBC W/AUTO DIFF WBC: CPT | Performed by: INTERNAL MEDICINE

## 2020-12-21 PROCEDURE — 82948 REAGENT STRIP/BLOOD GLUCOSE: CPT

## 2020-12-21 PROCEDURE — 88304 TISSUE EXAM BY PATHOLOGIST: CPT | Performed by: PATHOLOGY

## 2020-12-21 PROCEDURE — 87075 CULTR BACTERIA EXCEPT BLOOD: CPT | Performed by: PODIATRIST

## 2020-12-21 PROCEDURE — 87205 SMEAR GRAM STAIN: CPT | Performed by: PODIATRIST

## 2020-12-21 PROCEDURE — 99233 SBSQ HOSP IP/OBS HIGH 50: CPT | Performed by: INTERNAL MEDICINE

## 2020-12-21 PROCEDURE — 93926 LOWER EXTREMITY STUDY: CPT | Performed by: SURGERY

## 2020-12-21 PROCEDURE — 87040 BLOOD CULTURE FOR BACTERIA: CPT | Performed by: INTERNAL MEDICINE

## 2020-12-21 PROCEDURE — 93926 LOWER EXTREMITY STUDY: CPT

## 2020-12-21 PROCEDURE — 88305 TISSUE EXAM BY PATHOLOGIST: CPT | Performed by: PATHOLOGY

## 2020-12-21 PROCEDURE — 88311 DECALCIFY TISSUE: CPT | Performed by: PATHOLOGY

## 2020-12-21 PROCEDURE — 80048 BASIC METABOLIC PNL TOTAL CA: CPT | Performed by: INTERNAL MEDICINE

## 2020-12-21 PROCEDURE — 87186 SC STD MICRODIL/AGAR DIL: CPT | Performed by: PODIATRIST

## 2020-12-21 PROCEDURE — 73630 X-RAY EXAM OF FOOT: CPT

## 2020-12-21 PROCEDURE — 99232 SBSQ HOSP IP/OBS MODERATE 35: CPT | Performed by: FAMILY MEDICINE

## 2020-12-21 PROCEDURE — 87077 CULTURE AEROBIC IDENTIFY: CPT | Performed by: PODIATRIST

## 2020-12-21 PROCEDURE — 93922 UPR/L XTREMITY ART 2 LEVELS: CPT | Performed by: SURGERY

## 2020-12-21 PROCEDURE — 87070 CULTURE OTHR SPECIMN AEROBIC: CPT | Performed by: PODIATRIST

## 2020-12-21 PROCEDURE — 0Y6N0ZF DETACHMENT AT LEFT FOOT, PARTIAL 5TH RAY, OPEN APPROACH: ICD-10-PCS | Performed by: PODIATRIST

## 2020-12-21 PROCEDURE — 87176 TISSUE HOMOGENIZATION CULTR: CPT | Performed by: PODIATRIST

## 2020-12-21 RX ORDER — ONDANSETRON 2 MG/ML
4 INJECTION INTRAMUSCULAR; INTRAVENOUS ONCE AS NEEDED
Status: DISCONTINUED | OUTPATIENT
Start: 2020-12-21 | End: 2020-12-21 | Stop reason: HOSPADM

## 2020-12-21 RX ORDER — HYDROMORPHONE HCL/PF 1 MG/ML
0.5 SYRINGE (ML) INJECTION
Status: DISCONTINUED | OUTPATIENT
Start: 2020-12-21 | End: 2020-12-21 | Stop reason: HOSPADM

## 2020-12-21 RX ORDER — FENTANYL CITRATE/PF 50 MCG/ML
25 SYRINGE (ML) INJECTION
Status: DISCONTINUED | OUTPATIENT
Start: 2020-12-21 | End: 2020-12-21 | Stop reason: HOSPADM

## 2020-12-21 RX ORDER — INSULIN GLARGINE 100 [IU]/ML
20 INJECTION, SOLUTION SUBCUTANEOUS
Status: DISCONTINUED | OUTPATIENT
Start: 2020-12-21 | End: 2020-12-26

## 2020-12-21 RX ORDER — BUPIVACAINE HYDROCHLORIDE 5 MG/ML
INJECTION, SOLUTION PERINEURAL AS NEEDED
Status: DISCONTINUED | OUTPATIENT
Start: 2020-12-21 | End: 2020-12-21 | Stop reason: HOSPADM

## 2020-12-21 RX ORDER — MAGNESIUM HYDROXIDE 1200 MG/15ML
LIQUID ORAL AS NEEDED
Status: DISCONTINUED | OUTPATIENT
Start: 2020-12-21 | End: 2020-12-21 | Stop reason: HOSPADM

## 2020-12-21 RX ORDER — PROPOFOL 10 MG/ML
INJECTION, EMULSION INTRAVENOUS CONTINUOUS PRN
Status: DISCONTINUED | OUTPATIENT
Start: 2020-12-21 | End: 2020-12-21

## 2020-12-21 RX ORDER — LIDOCAINE HYDROCHLORIDE 10 MG/ML
INJECTION, SOLUTION EPIDURAL; INFILTRATION; INTRACAUDAL; PERINEURAL AS NEEDED
Status: DISCONTINUED | OUTPATIENT
Start: 2020-12-21 | End: 2020-12-21 | Stop reason: HOSPADM

## 2020-12-21 RX ORDER — MIDAZOLAM HYDROCHLORIDE 2 MG/2ML
INJECTION, SOLUTION INTRAMUSCULAR; INTRAVENOUS AS NEEDED
Status: DISCONTINUED | OUTPATIENT
Start: 2020-12-21 | End: 2020-12-21

## 2020-12-21 RX ORDER — FENTANYL CITRATE 50 UG/ML
INJECTION, SOLUTION INTRAMUSCULAR; INTRAVENOUS AS NEEDED
Status: DISCONTINUED | OUTPATIENT
Start: 2020-12-21 | End: 2020-12-21

## 2020-12-21 RX ORDER — TAMSULOSIN HYDROCHLORIDE 0.4 MG/1
0.4 CAPSULE ORAL
Status: DISCONTINUED | OUTPATIENT
Start: 2020-12-21 | End: 2021-01-06 | Stop reason: HOSPADM

## 2020-12-21 RX ADMIN — METRONIDAZOLE 500 MG: 500 TABLET ORAL at 23:08

## 2020-12-21 RX ADMIN — METOPROLOL SUCCINATE 100 MG: 50 TABLET, EXTENDED RELEASE ORAL at 08:04

## 2020-12-21 RX ADMIN — FENTANYL CITRATE 25 MCG: 50 INJECTION INTRAMUSCULAR; INTRAVENOUS at 15:32

## 2020-12-21 RX ADMIN — MELATONIN 3 MG: at 01:31

## 2020-12-21 RX ADMIN — METRONIDAZOLE 500 MG: 500 TABLET ORAL at 08:03

## 2020-12-21 RX ADMIN — INSULIN LISPRO 2 UNITS: 100 INJECTION, SOLUTION INTRAVENOUS; SUBCUTANEOUS at 23:06

## 2020-12-21 RX ADMIN — GABAPENTIN 600 MG: 300 CAPSULE ORAL at 08:05

## 2020-12-21 RX ADMIN — HEPARIN SODIUM 5000 UNITS: 5000 INJECTION INTRAVENOUS; SUBCUTANEOUS at 20:33

## 2020-12-21 RX ADMIN — METRONIDAZOLE 500 MG: 500 TABLET ORAL at 01:31

## 2020-12-21 RX ADMIN — OXYCODONE HYDROCHLORIDE 5 MG: 5 TABLET ORAL at 23:09

## 2020-12-21 RX ADMIN — ACETAMINOPHEN 975 MG: 325 TABLET ORAL at 19:38

## 2020-12-21 RX ADMIN — CEFEPIME HYDROCHLORIDE 2000 MG: 2 INJECTION, POWDER, FOR SOLUTION INTRAVENOUS at 11:53

## 2020-12-21 RX ADMIN — CEFEPIME HYDROCHLORIDE 1000 MG: 1 INJECTION, POWDER, FOR SOLUTION INTRAMUSCULAR; INTRAVENOUS at 03:16

## 2020-12-21 RX ADMIN — TRAMADOL HYDROCHLORIDE 50 MG: 50 TABLET, FILM COATED ORAL at 20:56

## 2020-12-21 RX ADMIN — TAMSULOSIN HYDROCHLORIDE 0.4 MG: 0.4 CAPSULE ORAL at 23:09

## 2020-12-21 RX ADMIN — LATANOPROST 1 DROP: 50 SOLUTION OPHTHALMIC at 23:08

## 2020-12-21 RX ADMIN — FENTANYL CITRATE 50 MCG: 50 INJECTION, SOLUTION INTRAMUSCULAR; INTRAVENOUS at 13:43

## 2020-12-21 RX ADMIN — MIDAZOLAM 2 MG: 1 INJECTION INTRAMUSCULAR; INTRAVENOUS at 13:45

## 2020-12-21 RX ADMIN — FENTANYL CITRATE 25 MCG: 50 INJECTION INTRAMUSCULAR; INTRAVENOUS at 15:21

## 2020-12-21 RX ADMIN — PROPOFOL 100 MCG/KG/MIN: 10 INJECTION, EMULSION INTRAVENOUS at 13:40

## 2020-12-21 RX ADMIN — ISOSORBIDE MONONITRATE 90 MG: 30 TABLET, EXTENDED RELEASE ORAL at 08:04

## 2020-12-21 RX ADMIN — SODIUM CHLORIDE 50 ML/HR: 0.9 INJECTION, SOLUTION INTRAVENOUS at 20:34

## 2020-12-21 RX ADMIN — INSULIN GLARGINE 20 UNITS: 100 INJECTION, SOLUTION SUBCUTANEOUS at 23:07

## 2020-12-21 RX ADMIN — SODIUM CHLORIDE 100 ML/HR: 0.9 INJECTION, SOLUTION INTRAVENOUS at 01:32

## 2020-12-21 RX ADMIN — SERTRALINE HYDROCHLORIDE 50 MG: 50 TABLET ORAL at 07:58

## 2020-12-21 RX ADMIN — TRAMADOL HYDROCHLORIDE 50 MG: 50 TABLET, FILM COATED ORAL at 08:04

## 2020-12-21 RX ADMIN — CEFEPIME HYDROCHLORIDE 2000 MG: 2 INJECTION, POWDER, FOR SOLUTION INTRAVENOUS at 23:34

## 2020-12-21 NOTE — PLAN OF CARE
Problem: Potential for Falls  Goal: Patient will remain free of falls  Description: INTERVENTIONS:  - Assess patient frequently for physical needs  -  Identify cognitive and physical deficits and behaviors that affect risk of falls  -  Emerson fall precautions as indicated by assessment   - Educate patient/family on patient safety including physical limitations  - Instruct patient to call for assistance with activity based on assessment  - Modify environment to reduce risk of injury  - Consider OT/PT consult to assist with strengthening/mobility  Outcome: Progressing     Problem: MUSCULOSKELETAL - ADULT  Goal: Maintain or return mobility to safest level of function  Description: INTERVENTIONS:  - Assess patient's ability to carry out ADLs; assess patient's baseline for ADL function and identify physical deficits which impact ability to perform ADLs (bathing, care of mouth/teeth, toileting, grooming, dressing, etc )  - Assess/evaluate cause of self-care deficits   - Assess range of motion  - Assess patient's mobility  - Assess patient's need for assistive devices and provide as appropriate  - Encourage maximum independence but intervene and supervise when necessary  - Involve family in performance of ADLs  - Assess for home care needs following discharge   - Consider OT consult to assist with ADL evaluation and planning for discharge  - Provide patient education as appropriate  Outcome: Progressing  Goal: Maintain proper alignment of affected body part  Description: INTERVENTIONS:  - Support, maintain and protect limb and body alignment  - Provide patient/ family with appropriate education  Outcome: Progressing     Problem: SAFETY ADULT  Goal: Patient will remain free of falls  Description: INTERVENTIONS:  - Assess patient frequently for physical needs  -  Identify cognitive and physical deficits and behaviors that affect risk of falls    -  Emerson fall precautions as indicated by assessment   - Educate patient/family on patient safety including physical limitations  - Instruct patient to call for assistance with activity based on assessment  - Modify environment to reduce risk of injury  - Consider OT/PT consult to assist with strengthening/mobility  Outcome: Progressing

## 2020-12-21 NOTE — OP NOTE
OPERATIVE REPORT - Podiatry  PATIENT NAME: Yancy Salinas    :  1945  MRN: [id number]  Pt Location: [facility]    SURGERY DATE: 2020    Surgeon(s) and Role:     * Orlie Schilder, DPM - Primary     * Magaly Capps DPM - Assisting    Pre-op Diagnosis:  Subacute osteomyelitis of left foot ([facility] [address] ) [M86 272]    Post-Op Diagnosis Codes:     * Subacute osteomyelitis of left foot ([facility] [address] ) [M86 272]    Procedure(s) (LRB):  RAY RESECTION FOOT (Left)    Specimen(s):  ID Type Source Tests Collected by Time Destination   1 : 5th metatarsal Tissue Foot, Left TISSUE EXAM Orlie Schilder, DPM 2020 1407    2 : 5th metatarsal- clean margin Tissue Foot, Left TISSUE EXAM Orlie Schilder, DPM 2020 1407    A : 5th metatarsal Wound Foot, Left ANAEROBIC CULTURE AND GRAM STAIN, WOUND CULTURE Orlie Schilder, DP 2020 1408    B : 5th metatarsal bone culture- clean margin Tissue Foot, Left ANAEROBIC CULTURE AND GRAM STAIN, CULTURE, TISSUE AND GRAM STAIN Orlie Schilder, DP 2020 1409        Estimated Blood Loss:   Minimal    Drains:  * No LDAs found *    Implants:  * No implants in log *    Anesthesia Type:   Choice with 10 ml of 1% Lidocaine and 0 5% Bupivacaine in a 1:1 mixture    Hemostasis:  Surgical dissection  Materials:  Nylon suture    Operative Findings:  Proximal bone was discolored but hard to the touch and healthy in apperance otherwise  Linear line of discoloration of dorsal lateral bone ridge  Area surrounding 5th metatarsal necrotic soft tissues and thrombosis from infection  Minimal purulence noted mostly surrounding 5th metatarsal head  No further sinus tracts noted  Adequate bleeding for healing potential is noted  Complications:   None    Procedure and Technique:     Under mild sedation, the patient was brought into the operating room and placed on the operating room table in the supine position   A pneumatic tourniquet was then placed around the patient's left lower extremity with ample webril padding, this was not inflated  A time out was performed to confirm the correct patient, procedure and site with all parties in agreement  Following IV sedation, a reverse guillory block was performed consisting of 10 ml of 1% Lidocaine and 0 5% Bupivacaine in a 1:1 mixture  A racquet type incision was drawn out over the dorsal left foot 5th digit  Immediately following incision deep wound cultures were taken  Utilizing a #15 blade, a full thickness incision was made down to bone  The incision was then sharply dissected out to isolate the 5 metatarsal shaft  Utilizing a key elevator, all soft tissue attachments were freed from the surface of the metatarsal  A sagittal saw was then used to make a cut in a dorsal distal to plantar proximal fashion with a slight obliquity  The 5th ray was then removed from the table and passed off  Any residual prominences were removed utilizing a rongeur  The remaining wound bed was then inspected  No remaining purulent sinus tracts were visualized  Any necrotic or nonviable soft tissues were sharply excised from the wound utilizing scalpel  Any residual exposed tendons were excised proximally to prevent any infection from tracking proximally  Bone proximal to the amputation site was noted to be of hard quality with discoloration  The wound was then flushed with copious amounts of normal sterile saline  Proximal clean margin bone culture was taken for culture and path     Skin edges were reapproximated and closure was obtained utilizing interrupted retention sutures utilizing 3-0  Nylon  The foot was then cleansed and dried  The incision site was dressed with Xeroform, 4x4 gauze, and ABD  This was then covered with a Kerlix and an ACE wrap  The patient tolerated the procedure and anesthesia well and was transported to the PACU with vital signs stable       As with many limb salvage procedures, we contemplate the possibility of performing further stages to this procedure  Procedures may include debridements, delayed closure, plastic surgery techniques, or more proximal amputations  This procedure may be considered part of a multi-staged limb salvage treatment plan  Dr Sara Dillard was present during the entire procedure and participated in all key aspects  SIGNATURE: Amy Dove DPM  DATE: December 21, 2020  TIME: 2:43 PM      Portions of the record may have been created with voice recognition software  Occasional wrong word or "sound a like" substitutions may have occurred due to the inherent limitations of voice recognition software  Read the chart carefully and recognize, using context, where substitutions have occurred

## 2020-12-21 NOTE — PROGRESS NOTES
Progress Note - Podiatry  Celeste Severin 66 y o  male MRN: [id number]  Unit/Bed#: E5 -01 Encounter: [id number]    Assessment:  66 y  o  male left foot cellulitis and underlying osteomyelitis of 5th metatarsal  - probes to bone with mild purulent drainage and cellulitis on the dorsal foot     1  Left foot osteomyelitis of 5th metatarsal  2  Left foot cellulitis  3  MSSA bacteremia   4  Insulin Dependent Diabetes mellitus  5  Coronary arterial disease  6  Peripheral arterial disease  7  Hypertension    Plan:   Plan for left partial 5th ray amputation with Dr Nevaeh Moyer today afternoon    Patient confirmed NPO status    Cleared from cardiology and vascular surgery for left foot surgical procedure   Patient agreeable with the plan and answered all questions and concerns   Continue IV abx as per ID recommendation IV cefepime and PO flagyl  Recommending 2 weeks of IV abx even with surgical cure of OM due to bacteremia     Plan discussed with Dr Nevaeh Moyer    Weight bearing status: WBAt Sx        Subjective/Objective   Chief Complaint: No chief complaint on file  Subjective: 66 y o  male was seen and evaluated at bedside  Patient denies any acute events overnight  Blood pressure 148/85, pulse 75, temperature 98 °F (36 7 °C), temperature source Temporal, resp  rate 18, SpO2 97 %  There is no height or weight on file to calculate BMI  Physical Exam:   General: Alert, cooperative and no distress  Lower Extremities: Neurovascular status at baseline bilaterally, musculoskeletal function at baseline bilaterally, no calf tenderness bilaterally          Lab, Imaging and other studies:   Results from last 7 days   Lab Units 12/21/20  0641   WBC Thousand/uL 11 99*   HEMOGLOBIN g/dL 10 1*   HEMATOCRIT % 30 6*   PLATELETS Thousands/uL 143*   NEUTROS PCT % 80*   LYMPHS PCT % 10*   MONOS PCT % 8   EOS PCT % 1     Results from last 7 days   Lab Units 12/21/20  0641  12/18/20  1705   POTASSIUM mmol/L 4 5   < > 5 0 CHLORIDE mmol/L 104   < > 106   CO2 mmol/L 23   < > 27   BUN mg/dL 54*   < > 50*   CREATININE mg/dL 2 67*   < > 2 47*   CALCIUM mg/dL 8 7   < > 8 9   ALK PHOS U/L  --   --  115   ALT U/L  --   --  61   AST U/L  --   --  22    < > = values in this interval not displayed  Results from last 7 days   Lab Units 12/18/20  1857 12/18/20  1706 12/18/20  1653   BLOOD CULTURE   --  No Growth at 48 hrs  Staphylococcus aureus*   GRAM STAIN RESULT  Rare Polys*  3+ Gram positive cocci in clusters*  --  Gram positive cocci in clusters*   WOUND CULTURE  2+ Growth of Enterobacter aerogenes*  4+ Growth of Staphylococcus aureus*  --   --      Results from last 7 days   Lab Units 12/18/20 1857   ANAEROBIC CULTURE  4+ Growth of   1+ Growth of Prevotella bivia*       Imaging: I have personally reviewed pertinent films and reports in PACS  EKG, Pathology, and Other Studies: I have personally reviewed pertinent reports  VTE Pharmacologic Prophylaxis: Sequential compression device (Venodyne)     Portions of the record may have been created with voice recognition software  Occasional wrong word or "sound a like" substitutions may have occurred due to the inherent limitations of voice recognition software  Read the chart carefully and recognize, using context, where substitutions have occurred      González Freeman DPM

## 2020-12-22 LAB
ANION GAP SERPL CALCULATED.3IONS-SCNC: 11 MMOL/L (ref 4–13)
BASOPHILS # BLD AUTO: 0.03 THOUSANDS/ΜL (ref 0–0.1)
BASOPHILS NFR BLD AUTO: 0 % (ref 0–1)
BUN SERPL-MCNC: 45 MG/DL (ref 5–25)
CALCIUM SERPL-MCNC: 8.6 MG/DL (ref 8.3–10.1)
CHLORIDE SERPL-SCNC: 104 MMOL/L (ref 100–108)
CO2 SERPL-SCNC: 22 MMOL/L (ref 21–32)
CREAT SERPL-MCNC: 2.4 MG/DL (ref 0.6–1.3)
EOSINOPHIL # BLD AUTO: 0.23 THOUSAND/ΜL (ref 0–0.61)
EOSINOPHIL NFR BLD AUTO: 2 % (ref 0–6)
ERYTHROCYTE [DISTWIDTH] IN BLOOD BY AUTOMATED COUNT: 13.5 % (ref 11.6–15.1)
GFR SERPL CREATININE-BSD FRML MDRD: 29 ML/MIN/1.73SQ M
GLUCOSE SERPL-MCNC: 121 MG/DL (ref 65–140)
GLUCOSE SERPL-MCNC: 131 MG/DL (ref 65–140)
GLUCOSE SERPL-MCNC: 138 MG/DL (ref 65–140)
GLUCOSE SERPL-MCNC: 145 MG/DL (ref 65–140)
GLUCOSE SERPL-MCNC: 179 MG/DL (ref 65–140)
GLUCOSE SERPL-MCNC: 226 MG/DL (ref 65–140)
HCT VFR BLD AUTO: 28.2 % (ref 36.5–49.3)
HGB BLD-MCNC: 9.1 G/DL (ref 12–17)
IMM GRANULOCYTES # BLD AUTO: 0.06 THOUSAND/UL (ref 0–0.2)
IMM GRANULOCYTES NFR BLD AUTO: 1 % (ref 0–2)
LYMPHOCYTES # BLD AUTO: 1.25 THOUSANDS/ΜL (ref 0.6–4.47)
LYMPHOCYTES NFR BLD AUTO: 11 % (ref 14–44)
MCH RBC QN AUTO: 30.3 PG (ref 26.8–34.3)
MCHC RBC AUTO-ENTMCNC: 32.3 G/DL (ref 31.4–37.4)
MCV RBC AUTO: 94 FL (ref 82–98)
MONOCYTES # BLD AUTO: 1.24 THOUSAND/ΜL (ref 0.17–1.22)
MONOCYTES NFR BLD AUTO: 11 % (ref 4–12)
NEUTROPHILS # BLD AUTO: 8.13 THOUSANDS/ΜL (ref 1.85–7.62)
NEUTS SEG NFR BLD AUTO: 75 % (ref 43–75)
NRBC BLD AUTO-RTO: 0 /100 WBCS
PLATELET # BLD AUTO: 142 THOUSANDS/UL (ref 149–390)
PMV BLD AUTO: 11.1 FL (ref 8.9–12.7)
POTASSIUM SERPL-SCNC: 4.3 MMOL/L (ref 3.5–5.3)
RBC # BLD AUTO: 3 MILLION/UL (ref 3.88–5.62)
SODIUM SERPL-SCNC: 137 MMOL/L (ref 136–145)
VANCOMYCIN TROUGH SERPL-MCNC: 5.3 UG/ML (ref 10–20)
WBC # BLD AUTO: 10.94 THOUSAND/UL (ref 4.31–10.16)

## 2020-12-22 PROCEDURE — 99232 SBSQ HOSP IP/OBS MODERATE 35: CPT | Performed by: SURGERY

## 2020-12-22 PROCEDURE — 93308 TTE F-UP OR LMTD: CPT | Performed by: INTERNAL MEDICINE

## 2020-12-22 PROCEDURE — 99232 SBSQ HOSP IP/OBS MODERATE 35: CPT

## 2020-12-22 PROCEDURE — 80048 BASIC METABOLIC PNL TOTAL CA: CPT | Performed by: PODIATRIST

## 2020-12-22 PROCEDURE — 97163 PT EVAL HIGH COMPLEX 45 MIN: CPT

## 2020-12-22 PROCEDURE — 85025 COMPLETE CBC W/AUTO DIFF WBC: CPT | Performed by: FAMILY MEDICINE

## 2020-12-22 PROCEDURE — 82948 REAGENT STRIP/BLOOD GLUCOSE: CPT

## 2020-12-22 PROCEDURE — 97166 OT EVAL MOD COMPLEX 45 MIN: CPT

## 2020-12-22 PROCEDURE — 80202 ASSAY OF VANCOMYCIN: CPT | Performed by: PODIATRIST

## 2020-12-22 PROCEDURE — 99232 SBSQ HOSP IP/OBS MODERATE 35: CPT | Performed by: INTERNAL MEDICINE

## 2020-12-22 RX ORDER — ONDANSETRON 2 MG/ML
4 INJECTION INTRAMUSCULAR; INTRAVENOUS EVERY 6 HOURS PRN
Status: DISCONTINUED | OUTPATIENT
Start: 2020-12-22 | End: 2021-01-06 | Stop reason: HOSPADM

## 2020-12-22 RX ORDER — HYDROMORPHONE HCL/PF 1 MG/ML
0.2 SYRINGE (ML) INJECTION ONCE
Status: COMPLETED | OUTPATIENT
Start: 2020-12-22 | End: 2020-12-22

## 2020-12-22 RX ADMIN — OXYCODONE HYDROCHLORIDE 5 MG: 5 TABLET ORAL at 08:40

## 2020-12-22 RX ADMIN — METRONIDAZOLE 500 MG: 500 TABLET ORAL at 06:23

## 2020-12-22 RX ADMIN — INSULIN LISPRO 1 UNITS: 100 INJECTION, SOLUTION INTRAVENOUS; SUBCUTANEOUS at 12:29

## 2020-12-22 RX ADMIN — INSULIN LISPRO 2 UNITS: 100 INJECTION, SOLUTION INTRAVENOUS; SUBCUTANEOUS at 17:27

## 2020-12-22 RX ADMIN — METRONIDAZOLE 500 MG: 500 TABLET ORAL at 15:09

## 2020-12-22 RX ADMIN — GABAPENTIN 600 MG: 300 CAPSULE ORAL at 08:30

## 2020-12-22 RX ADMIN — ISOSORBIDE MONONITRATE 90 MG: 30 TABLET, EXTENDED RELEASE ORAL at 08:30

## 2020-12-22 RX ADMIN — HEPARIN SODIUM 5000 UNITS: 5000 INJECTION INTRAVENOUS; SUBCUTANEOUS at 22:46

## 2020-12-22 RX ADMIN — TAMSULOSIN HYDROCHLORIDE 0.4 MG: 0.4 CAPSULE ORAL at 22:50

## 2020-12-22 RX ADMIN — DOCUSATE SODIUM AND SENNOSIDES 1 TABLET: 8.6; 5 TABLET, FILM COATED ORAL at 22:48

## 2020-12-22 RX ADMIN — METOPROLOL SUCCINATE 100 MG: 50 TABLET, EXTENDED RELEASE ORAL at 08:31

## 2020-12-22 RX ADMIN — OXYCODONE HYDROCHLORIDE 5 MG: 5 TABLET ORAL at 23:01

## 2020-12-22 RX ADMIN — HYDROMORPHONE HYDROCHLORIDE 0.2 MG: 1 INJECTION, SOLUTION INTRAMUSCULAR; INTRAVENOUS; SUBCUTANEOUS at 04:33

## 2020-12-22 RX ADMIN — ACETAMINOPHEN 975 MG: 325 TABLET ORAL at 17:25

## 2020-12-22 RX ADMIN — CEFEPIME HYDROCHLORIDE 2000 MG: 2 INJECTION, POWDER, FOR SOLUTION INTRAVENOUS at 12:29

## 2020-12-22 RX ADMIN — PANTOPRAZOLE SODIUM 40 MG: 40 TABLET, DELAYED RELEASE ORAL at 06:24

## 2020-12-22 RX ADMIN — LATANOPROST 1 DROP: 50 SOLUTION OPHTHALMIC at 22:48

## 2020-12-22 RX ADMIN — HEPARIN SODIUM 5000 UNITS: 5000 INJECTION INTRAVENOUS; SUBCUTANEOUS at 08:31

## 2020-12-22 RX ADMIN — INSULIN GLARGINE 20 UNITS: 100 INJECTION, SOLUTION SUBCUTANEOUS at 22:47

## 2020-12-22 RX ADMIN — SERTRALINE HYDROCHLORIDE 50 MG: 50 TABLET ORAL at 08:31

## 2020-12-22 RX ADMIN — ASPIRIN 81 MG CHEWABLE TABLET 81 MG: 81 TABLET CHEWABLE at 08:31

## 2020-12-22 RX ADMIN — METRONIDAZOLE 500 MG: 500 TABLET ORAL at 22:48

## 2020-12-22 RX ADMIN — CEFEPIME HYDROCHLORIDE 2000 MG: 2 INJECTION, POWDER, FOR SOLUTION INTRAVENOUS at 23:16

## 2020-12-22 RX ADMIN — ACETAMINOPHEN 975 MG: 325 TABLET ORAL at 12:29

## 2020-12-22 NOTE — PLAN OF CARE
Problem: PHYSICAL THERAPY ADULT  Goal: Performs mobility at highest level of function for planned discharge setting  See evaluation for individualized goals  Description: Treatment/Interventions: Functional transfer training, LE strengthening/ROM, Elevations, Therapeutic exercise, Endurance training, Patient/family training, Equipment eval/education, Bed mobility, Gait training, Spoke to nursing, Spoke to case management, OT  Equipment Recommended: Afia Desai       See flowsheet documentation for full assessment, interventions and recommendations  Note: Prognosis: Good  Problem List: Decreased strength, Decreased endurance, Decreased mobility, Impaired balance, Decreased coordination, Pain, Decreased skin integrity, Orthopedic restrictions, Impaired sensation     Barriers to Discharge: Inaccessible home environment, Decreased caregiver support     PT Discharge Recommendation: [id number]     PT - OK to Discharge: Yes    See flowsheet documentation for full assessment

## 2020-12-22 NOTE — PROGRESS NOTES
Progress Note - Podiatry  Aaron Soria 66 y o  male MRN: [id number]  Unit/Bed#: E5 -01 Encounter: [id number]    Assessment:  66 y  o  male left foot cellulitis and underlying osteomyelitis of 5th metatarsal now s/p left foot partial 5th ray amputation 12/21/20 POD1     1  Left foot osteomyelitis of 5th metatarsal  2  Left foot cellulitis  3  MSSA bacteremia   4  Insulin Dependent Diabetes mellitus  5  Coronary arterial disease  6  Peripheral arterial disease  7  Hypertension    Plan:   Surgical site is well approximated with sutures intact, NOT presumed surgical cure as intraoperatively bone was discolored with concerns for chronic osteomyelitis, clear margins were sent for culture and pathology review   Continue with IV cefepime and PO flagyl per ID   Appreciate medical management per SLIM    Weight bearing status: Nonweightbearing   Disposition: pending post acute rehab needs    Subjective/Objective   Chief Complaint: No chief complaint on file  Subjective: 66 y o  male was seen and evaluated at bedside  Denies pain to his surgical foot  Reports some diarrhea after surgery but has resolved since then  Denies nausea, vomiting, fever, chills  Blood pressure 127/81, pulse 66, temperature 97 9 °F (36 6 °C), temperature source Temporal, resp  rate 18, SpO2 98 %  There is no height or weight on file to calculate BMI        Physical Exam:   General: Alert, cooperative and no distress  Lower Extremities: Neurovascular status at baseline bilaterally, musculoskeletal function within normal limits bilaterally, no calf tenderness bilaterally, left foot incision site is well approximated with sutures intact, no drainage, no necrosis noted       Lab, Imaging and other studies:   Results from last 7 days   Lab Units 12/22/20  0506   WBC Thousand/uL 10 94*   HEMOGLOBIN g/dL 9 1*   HEMATOCRIT % 28 2*   PLATELETS Thousands/uL 142*   NEUTROS PCT % 75   LYMPHS PCT % 11*   MONOS PCT % 11   EOS PCT % 2     Results from last 7 days   Lab Units 12/22/20  0506  12/18/20  1705   POTASSIUM mmol/L 4 3   < > 5 0   CHLORIDE mmol/L 104   < > 106   CO2 mmol/L 22   < > 27   BUN mg/dL 45*   < > 50*   CREATININE mg/dL 2 40*   < > 2 47*   CALCIUM mg/dL 8 6   < > 8 9   ALK PHOS U/L  --   --  115   ALT U/L  --   --  61   AST U/L  --   --  22    < > = values in this interval not displayed  Results from last 7 days   Lab Units 12/21/20  1409 12/21/20  1408 12/21/20  0643 12/21/20  0642 12/18/20  1857 12/18/20  1706 12/18/20  1653   BLOOD CULTURE   --   --  No Growth at 24 hrs  No Growth at 24 hrs   --  No Growth at 72 hrs  Staphylococcus aureus*   GRAM STAIN RESULT  No Polys or Bacteria seen No Polys  No organisms seen  --   --  Rare Polys*  3+ Gram positive cocci in clusters*  --  Gram positive cocci in clusters*   WOUND CULTURE   --   --   --   --  2+ Growth of Enterobacter aerogenes*  4+ Growth of Staphylococcus aureus*  --   --      Results from last 7 days   Lab Units 12/18/20  1857   ANAEROBIC CULTURE  4+ Growth of   1+ Growth of Prevotella bivia*       Imaging: I have personally reviewed pertinent films and/or reports in PACS  EKG, Pathology, and Other Studies: I have personally reviewed pertinent reports  Portions of the record may have been created with voice recognition software  Occasional wrong word or "sound a like" substitutions may have occurred due to the inherent limitations of voice recognition software  Read the chart carefully and recognize, using context, where substitutions have occurred      Sharmin Ivan DPM

## 2020-12-22 NOTE — ASSESSMENT & PLAN NOTE
Lab Results   Component Value Date    HGBA1C 7 4 (H) 12/18/2020       Recent Labs     12/20/20  1607 12/20/20  2044 12/21/20  0723 12/21/20  1110   POCGLU 183* 276* 119 130       Blood Sugar Average: Last 72 hrs:  (P) 555 [id number]     Acceptable control with hemoglobin A1c of 7 4  Home Regimen is 27U HS and 10-12U TID AC  - with diet resumed, will increase Lantus to 20 units HS, hold scheduled t i d   Humalog and proceed with Accu-Cheks and insulin sliding scale

## 2020-12-22 NOTE — PROGRESS NOTES
Progress Note - Cardiology   Molly Douglas 66 y o  male MRN: [id number]  Unit/Bed#: E5 -01 Encounter: [id number]      Assessment/Recommendations/Discussion:   1  MSSA bacteremia 1/2 sets  2  Left foot osteomyelitis  3  Coronary artery disease  4  Hypertension  5  Hyperlipidemia  6  Peripheral vascular disease    PLAN   I reviewed his echo images  The report is still pending in the system however there is no evidence that I can tell of cardiac valvular vegetations to suggest endocarditis   He has remained stable from a cardiovascular standpoint post surgery   Continue with antibiotics per Infectious Disease   Continue aspirin 81, Imdur 90, metoprolol succinate 100 for CAD   Will need outpatient follow-up with his primary cardiologist at [facility]  Has had a severe skin reaction to statins in the past   May need Zetia or PCSK9 inhibitor   No further inpatient cardiac workup   Cardiology will sign off        Subjective:   HPI  Doing well status post surgery  Denies chest pain or shortness of breath  Review of Systems: As noted in HPI  Rest of ROS is negative  Vitals:   /81 (BP Location: Right arm)   Pulse 66   Temp 97 9 °F (36 6 °C) (Temporal)   Resp 18   SpO2 98%   There were no vitals filed for this visit  Intake/Output Summary (Last 24 hours) at 12/22/2020 1249  Last data filed at 12/21/2020 1630  Gross per 24 hour   Intake 1650 ml   Output 300 ml   Net 1350 ml       Physical Exam:  General appearance: alert and oriented, in no acute distress  Head: Normocephalic, without obvious abnormality, atraumatic  Eyes: conjunctivae/corneas clear  Anicteric    Neck: no adenopathy, no carotid bruit, no JVD  Lungs: clear to auscultation bilaterally  Heart: regular rate and rhythm, S1, S2 normal, no murmur, no click, rub or gallop  Abdomen:  soft, non-tender; bowel sounds normal; no masses,  no organomegaly  Extremities:  Left foot with wound with dressing in place  Skin: Skin color, texture, turgor normal  No rashes or lesions     Lab Results:  Results from last 7 days   Lab Units 12/22/20  0506   WBC Thousand/uL 10 94*   HEMOGLOBIN g/dL 9 1*   HEMATOCRIT % 28 2*   PLATELETS Thousands/uL 142*     Results from last 7 days   Lab Units 12/22/20  0506  12/18/20  1705   POTASSIUM mmol/L 4 3   < > 5 0   CHLORIDE mmol/L 104   < > 106   CO2 mmol/L 22   < > 27   BUN mg/dL 45*   < > 50*   CREATININE mg/dL 2 40*   < > 2 47*   CALCIUM mg/dL 8 6   < > 8 9   ALK PHOS U/L  --   --  115   ALT U/L  --   --  61   AST U/L  --   --  22    < > = values in this interval not displayed       Results from last 7 days   Lab Units 12/22/20  0506   POTASSIUM mmol/L 4 3   CHLORIDE mmol/L 104   CO2 mmol/L 22   BUN mg/dL 45*   CREATININE mg/dL 2 40*   CALCIUM mg/dL 8 6           Medications:    Current Facility-Administered Medications:     acetaminophen (TYLENOL) tablet 975 mg, 975 mg, Oral, Q6H [address], Sandor Guallpa DPM, 975 mg at 12/22/20 1229    ALPRAZolam (XANAX) tablet 0 25 mg, 0 25 mg, Oral, HS PRN, Sandor Guallpa DPM    aspirin chewable tablet 81 mg, 81 mg, Oral, Daily, Sandor Guallpa DPM, 81 mg at 12/22/20 0831    cefepime (MAXIPIME) 2,000 mg in dextrose 5 % 50 mL IVPB, 2,000 mg, Intravenous, Q12H, Sandor Guallpa DPM, Last Rate: 100 mL/hr at 12/22/20 1229, 2,000 mg at 12/22/20 1229    fluticasone (FLONASE) 50 mcg/act nasal spray 1 spray, 1 spray, Each Nare, Daily, Sandor Guallpa DPM    gabapentin (NEURONTIN) capsule 600 mg, 600 mg, Oral, Daily, Sandor Guallpa DPM, 600 mg at 12/22/20 0830    heparin (porcine) subcutaneous injection 5,000 Units, 5,000 Units, Subcutaneous, Q12H [address], 5,000 Units at 12/22/20 0831 **AND** [COMPLETED] Platelet count, , , Once, Sandor Guallpa DPM    insulin glargine (LANTUS) subcutaneous injection 20 Units 0 2 mL, 20 Units, Subcutaneous, HS, Gwen Jay MD, 20 Units at 12/21/20 2307    insulin lispro (HumaLOG) 100 units/mL subcutaneous injection 1-5 Units, 1-5 Units, Subcutaneous, 4x Daily (AC & HS), 1 Units at 12/22/20 1229 **AND** Fingerstick Glucose (POCT), , , 4x Daily AC and at bedtime, Madisyn Diana, DPM    isosorbide mononitrate (IMDUR) 24 hr tablet 90 mg, 90 mg, Oral, Daily, Madisyn Diana, DPM, 90 mg at 12/22/20 0830    latanoprost (XALATAN) 0 005 % ophthalmic solution 1 drop, 1 drop, Both Eyes, HS, Madisyn Diana, DPM, 1 drop at 12/21/20 2308    melatonin tablet 3 mg, 3 mg, Oral, HS PRN, Madisyn Diana, DPM, 3 mg at 12/21/20 0131    metoprolol succinate (TOPROL-XL) 24 hr tablet 100 mg, 100 mg, Oral, Daily, Madisyn Diana, DPM, 100 mg at 12/22/20 0831    metroNIDAZOLE (FLAGYL) tablet 500 mg, 500 mg, Oral, Q8H, Madisyn Diana, DPM, 500 mg at 12/22/20 7828    ondansetron (ZOFRAN) injection 4 mg, 4 mg, Intravenous, Q6H PRN, Madisyn Diana, DPM    oxyCODONE (ROXICODONE) IR tablet 5 mg, 5 mg, Oral, Q6H PRN, Madisyn Diana, DPM, 5 mg at 12/22/20 0840    pantoprazole (PROTONIX) EC tablet 40 mg, 40 mg, Oral, Early Morning, Madisyn Diana, DPM, 40 mg at 12/22/20 0993    polyethylene glycol (MIRALAX) packet 17 g, 17 g, Oral, Daily, Madisyn Diana, DPM, 17 g at 12/20/20 1761    senna-docusate sodium (SENOKOT S) 8 6-50 mg per tablet 1 tablet, 1 tablet, Oral, HS, Madisyn Diana, DPM, 1 tablet at 12/20/20 2114    sertraline (ZOLOFT) tablet 50 mg, 50 mg, Oral, Daily, Madisyn Diana, DPM, 50 mg at 12/22/20 0831    sodium chloride 0 9 % infusion, 50 mL/hr, Intravenous, Continuous, Madisyn Diana, DPM, Last Rate: 50 mL/hr at 12/21/20 2034, 50 mL/hr at 12/21/20 2034    tamsulosin (FLOMAX) capsule 0 4 mg, 0 4 mg, Oral, HS, Madisyn Diana, DPM, 0 4 mg at 12/21/20 2309    traMADol (ULTRAM) tablet 50 mg, 50 mg, Oral, Q12H PRN, Madisyn Diana, DPM, 50 mg at 12/21/20 2056    This note was completed in part utilizing [employer] Direct Software    Grammatical errors, random word insertions, spelling mistakes, and incomplete sentences may be an occasional consequence of this system secondary to software limitations, ambient noise, and hardware issues  If you have any questions or concerns about the content, text, or information contained within the body of this dictation, please contact the provider for clarification      Reyna Campos DO, [facility] [place]  12/22/2020 12:49 PM

## 2020-12-22 NOTE — ASSESSMENT & PLAN NOTE
66year old male with CAD s/p PCI (2019), HTN, HLD, CKD (baseline Cr 2 3-2 6), DM2, hx of RYGB presenting with LLE wound with underlying OM  POD#1 s/p 5th ray resection with podiatry    Diagnostics:  LEADs - Unreliable  Biphasic/Triphasic waveforms noted in distal tibials   XR foot (left) - Cortical destruction along the lateral aspect of the 5th metatarsal head, consistent with osteomyelitis  MRI - Evidence of OM  BC - Negative x72 hours    Recommendations:   -Patient with nonhealing wound to left lower extremity with underlying OM   -LEADs previously performed in October at [facility] suggesting adequate healing potential however will repeat LEADs this admission to reassess and view waveforms   -Patient is POD#1 s/p 5th ray resection  Op note reports adequate bleeding   -BC negative x72 hours   -LEADs unreliable but good biphasic/triphasic waveforms in the distal tibials   -Continue abx per primary  -Will sign off and follow up as an outpatient to assess healing progress   Patient may eventually require an arteriogram

## 2020-12-22 NOTE — PLAN OF CARE
Problem: OCCUPATIONAL THERAPY ADULT  Goal: Performs self-care activities at highest level of function for planned discharge setting  See evaluation for individualized goals  Description: Treatment Interventions: ADL retraining, Functional transfer training, UE strengthening/ROM, Endurance training, Patient/family training, Equipment evaluation/education, Compensatory technique education, Energy conservation, Activityengagement          See flowsheet documentation for full assessment, interventions and recommendations  Note: Limitation: Decreased ADL status, Decreased endurance, Decreased self-care trans, Decreased high-level ADLs, Decreased Safe judgement during ADL  Prognosis: Good  Assessment: Pt is a 66 y o  male seen for OT evaluation s/p adm to [address] on 12/18/2020 w/ L foot cellulitis and underlying OM of 5th metatarsal now s/p L foot partial 5th ray amputation on 12/21/2020  NWB L LE  Comorbidities affecting pts functional performance include a significant PMH of Anemia, Bladder CA, CAD, CKD stage 4, DM, HTN, Hypotension, Insomnia, SOB, and transient cerebral ischemia  Pt with active OT orders  Pt lives with spouse in a multi-level house with a ramped entrance through garage and stair glide to 2nd floor bed/bath  Pt reports spouse unable to provide physical assistance in home environment  At baseline, pt was I w/ ADLs, IADLs, and functional mobility/transfers w/o use of AD and reports 3 falls PTA 2* balance deficits  Upon evaluation, pt currently requires Supervision for UB ADLs, Min A for LB ADLs, Min A for toileting, Supervision for bed mobility, Supervision for transfers, and Min A for functional mobility 2* the following deficits impacting occupational performance: weakness, decreased strength, decreased balance, decreased tolerance, decreased safety awareness and increased pain   These impairments, as well at pts limited home support, difficulty performing ADLS and difficulty performing IADLS  limit pts ability to safely engage in all baseline areas of occupation  Pt scored overall 55/100 on the Barthel Index  Based on the aforementioned OT evaluation, functional performance deficits, and assessments, pt has been identified as a Moderate complexity evaluation  Pt to continue to benefit from continued acute OT services during hospital stay to address defined deficits and to maximize level of functional independence in the following Occupational Performance areas: grooming, bathing/shower, toilet hygiene, dressing, medication management, health maintenance, functional mobility, community mobility, clothing management, cleaning, meal prep and household maintenance  From OT standpoint, recommend STR upon D/C   OT will continue to follow pt 3-5x/wk to address the following goals to  w/in 10-14 days:     OT Discharge Recommendation: [id number]  OT - OK to Discharge: Yes(when medically cleared to rehab)

## 2020-12-22 NOTE — PHYSICAL THERAPY NOTE
Physical Therapy Evaluation    Patient's Name: Chrissy Felton    Admitting Diagnosis  Osteomyelitis of foot, unspecified laterality, unspecified type ([place] [address] ) [M86 [id number]]    Problem List  Patient Active Problem List   Diagnosis    CAD (coronary artery disease)    Chronic obstructive pulmonary disease ([facility] )    Carcinoma in situ of bladder    Hypertension with renal disease    Hyperlipidemia    Presence of stent in coronary artery    Type 2 diabetes mellitus, with long-term current use of insulin ([facility])    CKD (chronic kidney disease) stage 3, GFR 30-59 ml/min ([facility])    Primary osteoarthritis of left knee    Malignant tumor of urinary bladder ([facility])    Cystitis due to intravesical BCG administration    Degeneration of lumbar intervertebral disc    Back pain    Arteriosclerosis of artery of extremity ([facility] )    Stable angina pectoris ([facility])    Herpes zoster without complication    Localized edema    Superficial phlebitis    Preop examination    Acute cystitis without hematuria    Acute kidney injury superimposed on chronic kidney disease ([facility] )    Secondary renal hyperparathyroidism ([facility] )    Bladder cancer ([place] [address] )    Abnormal MRI, lumbar spine    Diabetic polyneuropathy associated with type 2 diabetes mellitus ([facility] )    Dysuria    Diabetic ulcer of left foot associated with type 2 diabetes mellitus, 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 ([facility])    CKD (chronic kidney disease) stage 4, GFR 15-29 ml/min ([facility])    Chronic anemia    Subacute osteomyelitis of left foot ([facility])    Anemia of chronic disease    Preoperative examination    PVD (peripheral vascular disease) ([facility] )    Left carotid bruit    MSSA bacteremia       Past Medical History  Past Medical History:   Diagnosis Date    Anemia     Last assessed: 9/28/17    Arteriosclerotic cardiovascular disease     Last assessed: 9/28/17    Bladder cancer ([facility] )     bladder    CAD (coronary artery disease)     Chronic kidney disease     CKD (chronic kidney disease) stage 4, GFR 15-29 ml/min ([facility])     Colon polyp     Diabetes mellitus [facility] )     Foot ulcer ([facility] ) 09/2020    left foot- treated with antibiotics, followed by podiatrist    GERD (gastroesophageal reflux disease)     Hypertension     Hypotension     Last assessed: 2/24/15    Insomnia     Last assessed: 11/14/12    Loss of hearing     has hearing aids but usually does not wear them    Other seasonal allergic rhinitis     Last assessed: 2/10/16    Shortness of breath     Transient cerebral ischemia     Wears glasses        Past Surgical History  Past Surgical History:   Procedure Laterality Date    CARDIAC SURGERY      Cath stent placement  Last assessed: 3/9/17  Interventional Catheterization    CHOLECYSTECTOMY      COLONOSCOPY      CYSTOSCOPY      Diagnostic w/biopsy  Ledell Males  Last assessed: 12/1/14    CYSTOURETHROSCOPY      w/cautery  Ledell Males    GASTRIC BYPASS      For morbid obesity w/Shaji-en-Y  Resolved: 11/17/09    INCISION AND DRAINAGE OF WOUND Right 2/26/2017    Procedure: INCISION AND DRAINAGE (I&D) EXTREMITY WITH APPLICATION OF GRAFT JACKET;  Surgeon: Eusebia Benedict DPM;  [facility];  Service:     INCISION AND DRAINAGE OF WOUND Right 4/25/2017    Procedure: INCISION AND DRAINAGE (I&D) EXTREMITY, APPLICATION OF GRAFT;  Surgeon: Eusebia Benedict DPM;  [facility];  Service:     IR BIOPSY OTHER  7/2/2020    JOINT REPLACEMENT      Knee   Last assessed: 1/28/11    VA AMPUTATION METATARSAL+TOE,SINGLE Left 12/21/2020    Procedure: RAY RESECTION FOOT;  Surgeon: Dickson Rojas DPM;  [facility];  Service: Podiatry    VA CYSTOURETHROSCOPY,BIOPSY N/A 8/16/2016    Procedure: Tanner Raman;  Surgeon: Linda Steele MD;  [facility];  Service: Urology    VA CYSTOURETHROSCOPY,FULGUR <0 5 CM LESN N/A 11/19/2020    Procedure: CYSTO W/BIOPSIES, transurethral prostate bx;  Surgeon: Britt Davis MD; [facility];  Service: Urology    ROTATOR CUFF REPAIR      SMALL INTESTINE SURGERY      Surgery Shaji-en-Y    SPINAL FUSION      lumbar and cervical fusions    VAC DRESSING APPLICATION Right 0/19/0936    Procedure: APPLICATION VAC DRESSING;  Surgeon: Kai Freedman DPM;  [facility];  Service:        Recent Imaging  VAS lower limb arterial duplex, limited, unilateral   Final Result by Safia Kunz DO (12/21 2159)      XR foot left 3+ views   Final Result by Oscar Parra DO (12/22 1011)      Expected postoperative change status post resection of 5th digit  Workstation performed: [id number]         MRI foot/forefoot toes left wo contrast   Final Result by Ben Menjivar MD (12/20 1946)      Interval enlargement of plantar lateral ulcer at the level of the fifth metatarsal head  New 1 cm minimally complex fluid collection immediately dorsal lateral to the fifth metatarsal head attributed to small abscess  Osteomyelitis fifth metatarsal head The region of abnormal marrow considered definite osteomyelitis (confluent, medullary T1 marrow space hypointensity,) extends 1 cm cm proximal from the tip of the fifth metatarsal head  (Series 3, image 6 ) Hazy T1    hypointensity signal extends approximately 3 cm proximal to the distal cortex series 3 image 6  Less specific marrow abnormalities (hyperintensity on T2 fat sat/STIR) extend for 2 3 cm  (Series 4, image 6 )      Osteomyelitis medial plantar fifth proximal phalangeal base  The region of abnormal marrow considered definite osteomyelitis (confluent, medullary T1 marrow space hypointensity,) extends 0 4 cm distal to the proximal cortex   (Series 3, image 6 ) Less    specific marrow abnormalities (hyperintensity on T2 fat sat/STIR) extend for 0 4 cm   (Series 4, image 6 )       REFERENCE: Society of Skeletal Radiology, White Paper on Nomenclature for Musculoskeletal Infection on MRI (https://skeletalrad org/sites/default/files/2020 Annual Meeting/Nomenclature%20for%20Musculoskeletal%20Infection%20on%20MRI pdf)      This study demonstrates a significant  finding and was documented as such in [facility] for liaison and referring practitioner notification  Workstation performed: [id number]             Recent Vital Signs  Vitals:    12/21/20 1610 12/21/20 1938 12/21/20 2300 12/22/20 0802   BP: 136/84 131/69 134/64 127/81   BP Location: Right arm Right arm Right arm Right arm   Pulse: 63 79 78 66   Resp: 18 19 18 18   Temp: 97 6 °F (36 4 °C) 97 9 °F (36 6 °C) 97 8 °F (36 6 °C) 97 9 °F (36 6 °C)   TempSrc: Temporal Temporal Temporal Temporal   SpO2: 100% 99% 97% 98%        12/22/20 0850   PT Last Visit   PT Visit Date 12/22/20   Note Type   Note type Evaluation   Pain Assessment   Pain Assessment Tool 0-10   Pain Score 7   Pain Location/Orientation Orientation: Left; Location: [facility] Pain Intervention(s) Repositioned; Ambulation/increased activity   Home Living   Type of [address] [place] [address] Multi-level;Ramped entrance;1/2 bath on main level;Bed/bath upstairs  (ramp through garage with stairglide to 2nd floor)   [address] Walker;Cane  (not using PTA)   Prior Function   Level of [place] Independent with ADLs and functional mobility   Lives With Spouse   Receives Help From Family   ADL Assistance Independent   Falls in the last 6 months 1 to 4  (reporting poor balance)   Restrictions/Precautions   Weight Bearing Precautions Per Order Yes   LLE Weight Bearing Per Order NWB   Other Precautions Pain; Fall Risk;WBS   General   Family/Caregiver Present No   Cognition   Overall Cognitive Status WFL   Arousal/Participation Alert   Orientation Level Oriented X4   Memory Within functional limits   Following Commands Follows all commands and directions without difficulty   Strength RLE   R Hip Flexion 3+/5   R Knee Extension 4/5   R Ankle Dorsiflexion 3/5   R Ankle Plantar Flexion 3/5   Strength LLE   L Hip Flexion 3+/5   L Knee Extension 4/5   L Ankle Dorsiflexion 3/5   Coordination   Movements are Fluid and Coordinated 1   Sensation X   Light Touch   RLE Light Touch Impaired   RLE Light Touch Comments numbness in foot   LLE Light Touch Impaired   LLE Light Touch Comments numbness in foot   Bed Mobility   Supine to Sit 5  Supervision   Additional items Increased time required;Verbal cues   Additional Comments left in recliner   Transfers   Sit to Stand 5  Supervision   Additional items Armrests; Increased time required;Verbal cues   Stand to Sit 5  Supervision   Additional items Armrests; Increased time required;Verbal cues   Additional Comments with RW   Ambulation/Elevation   Gait pattern   (RLE hopping with RW)   Gait Assistance 4  Minimal assist   Additional items Assist x 1;Verbal cues; Tactile cues   Assistive Device Rolling walker   Distance 15ft   Balance   Static Sitting Fair +   Dynamic Sitting Fair   Static Standing Fair -   Dynamic Standing Poor +   Ambulatory Poor +   Endurance Deficit   Endurance Deficit Yes   Endurance Deficit Description LE fatigue   Activity Tolerance   Activity Tolerance Patient limited by fatigue;Patient limited by pain   Medical Staff Made Aware spoke to OT   Nurse Made Aware spoke to RN   Assessment   Prognosis Good   Problem List Decreased strength;Decreased endurance;Decreased mobility; Impaired balance;Decreased coordination;Pain;Decreased skin integrity;Orthopedic restrictions; Impaired sensation   Barriers to Discharge Inaccessible home environment;Decreased caregiver support   Goals   Patient Goals to go to rehab and get stronger   STG Expiration Date 01/01/21   Short Term Goal #1 Pt will complete bed mobility, transfers, ambulation hopping with RW maintaining NWB LLE 100ft to return home  Plan   Treatment/Interventions Functional transfer training;LE strengthening/ROM; Elevations; Therapeutic exercise; Endurance training;Patient/family training;Equipment eval/education; Bed mobility;Gait training;Spoke to nursing;Spoke to case management;OT   PT Frequency   (3-5x/wk)   Recommendation   PT Discharge Recommendation Post-Acute Rehabilitation Services   Equipment Recommended Walker   PT - OK to Discharge Yes   Additional Comments to rehab when medically cleared   [id number] Mobility Inpatient   Turning in Bed Without Bedrails 3   Lying on Back to Sitting on Edge of Flat Bed 3   Moving Bed to Chair 3   Standing Up From Chair 3   Walk in Room 3   Climb 3-5 Stairs 2   Basic Mobility Inpatient Raw Score 17   Basic Mobility Standardized Score 39 67         ASSESSMENT                                                                                                                     Chrissy Felton is a 66 y o  male admitted to [facility] on 12/18/2020 for Subacute osteomyelitis of left foot ([facility] )  Pt  has a past medical history of Anemia, Arteriosclerotic cardiovascular disease, Bladder cancer ([facility] ), CAD (coronary artery disease), Chronic kidney disease, CKD (chronic kidney disease) stage 4, GFR 15-29 ml/min ([facility] ), Colon polyp, Diabetes mellitus ([facility] ), Foot ulcer ([facility] ) (09/2020), GERD (gastroesophageal reflux disease), Hypertension, Hypotension, Insomnia, Loss of hearing, Other seasonal allergic rhinitis, Shortness of breath, Transient cerebral ischemia, and Wears glasses    PT was consulted and pt was seen on 12/22/2020 for mobility assessment and d/c planning  Pt presents supine in bed alert and agreeable to therapy  He is reporting 7/10 pain in the L foot  He has impaired sensation to light touch in [place] feet to ankles  He has good strength in the LEs however fatigues quickly with hopping, his balance is also decreased  Pt is currently functioning at a supervision assistance x1 level for bed mobility, supervision assistance x1 level for transfers, minimum assistance x1 level for ambulation with Rolling Walker hopping with RLE    Pt will benefit from continued skilled IP PT to address the above mentioned impairments  in order to maximize recovery and increase functional independence when completing mobility and ADLs  Currently PT recommendations for DME include RW for transfers and ambulation  At this time PT recommendations for d/c are inpt rehab        Denise Bhatt PT, DPT

## 2020-12-22 NOTE — ASSESSMENT & PLAN NOTE
One of 2 sets from 12/18/2020  Felt to be transient bacteremia in setting of osteomyelitis, Infectious Disease following  Patient currently on Flagyl and cefepime, likely plan to transition to cephazolin from cefepime tomorrow

## 2020-12-22 NOTE — PROGRESS NOTES
Progress Note - Infectious Disease   Elizabeth Ramírez 66 y o  male MRN: [id number]  Unit/Bed#: E5 -01 Encounter: [id number]      Impression/Plan:  1  MSSA bacteremia-with only 1 of 2 sets positive   While this could represent a contaminant, the patient had the same organism isolated in the foot wound which has been worsening, therefore I suspect this is more of a transient bacteremia   Consideration for the possibility of endocarditis although I doubt   Fortunately the patient is not systemically ill, is hemodynamically stable   He seems to be tolerating the antibiotics without difficulty  Follow-up blood cultures are negative thus far  -antibiotics as below  -follow up repeat blood cultures  -check transthoracic echocardiogram  -additional workup as needed     2  Left foot osteomyelitis-involving primarily the 5th metatarsal but also the phalanx   Infection appears to be polymicrobial although I suspect that MSSA is the primary invasive organism   Fortunately the infection seems to be fairly well localized  patient is now status post 5th ray amputation  -continue cefepime for now at current dose  -continue Flagyl  -patient to the OR today for 5th ray amputation  -likely simplify antibiotics back to cefazolin tomorrow  -recheck CBC with diff and BMP  -close podiatry follow-up  -serial exams     3   Chronic kidney disease-advanced   Renal function is  slowly improving   Possibly a pre renal issue   -dose adjusted antibiotics as above  -volume management  -recheck BMP     4  Chronic dysuria-with positive urine culture for BCG as expected post treatment for bladder cancer   The patient is now status post biopsy with mild chronic inflammation but AFB smears negative for invasive disease  AFB Culture from the biopsies negative thus far    -follow-up AFB cultures  -recheck AFB cultures  -follow-up with Urology and in the Infectious Disease office    Discussed the above management plan in detail with the Podiatry service    Antibiotics:  Cefepime 3  Flagyl 5  Antibiotics 5  Postop day 1    Subjective:  Patient has no fever, chills, sweats; no nausea, vomiting, diarrhea; no cough, shortness of breath; no increased pain  No new symptoms  He underwent left 5th ray amputation yesterday    Objective:  Vitals:  Temp:  [97 5 °F (36 4 °C)-98 1 °F (36 7 °C)] 97 9 °F (36 6 °C)  HR:  [60-79] 66  Resp:  [10-19] 18  BP: (119-136)/(64-84) 127/81  SpO2:  [97 %-100 %] 98 %  Temp (24hrs), Av 8 °F (36 6 °C), Min:97 5 °F (36 4 °C), Max:98 1 °F (36 7 °C)  Current: Temperature: 97 9 °F (36 6 °C)    Physical Exam:   General Appearance:  Alert, interactive, nontoxic, no acute distress  Throat: Oropharynx moist without lesions  Lungs:   Clear to auscultation bilaterally; no wheezes, rhonchi or rales; respirations unlabored   Heart:  RRR; no murmur, rub or gallop   Abdomen:   Soft, non-tender, non-distended, positive bowel sounds  Extremities: No clubbing, cyanosis  Left foot heavily dressed with dry dressing in place  Skin: No new rashes or lesions  No draining wounds noted  Labs, Imaging, & Other studies:   All pertinent labs and imaging studies were personally reviewed  Results from last 7 days   Lab Units 20  0506 20  0641 20  0607   WBC Thousand/uL 10 94* 11 99* 10 83*   HEMOGLOBIN g/dL 9 1* 10 1* 9 2*   PLATELETS Thousands/uL 142* 143* 127*     Results from last 7 days   Lab Units 20  0506 20  0641 20  0607  20  1705   SODIUM mmol/L 137 136 136   < > 140   POTASSIUM mmol/L 4 3 4 5 4 4   < > 5 0   CHLORIDE mmol/L 104 104 105   < > 106   CO2 mmol/L 22 23 24   < > 27   BUN mg/dL 45* 54* 52*   < > 50*   CREATININE mg/dL 2 40* 2 67* 2 86*   < > 2 47*   EGFR ml/min/1 73sq m 29 25 23   < > 28   CALCIUM mg/dL 8 6 8 7 8 3   < > 8 9   AST U/L  --   --   --   --  22   ALT U/L  --   --   --   --  61   ALK PHOS U/L  --   --   --   --  115    < > = values in this interval not displayed  Results from last 7 days   Lab Units 12/21/20  1409 12/21/20  1408 12/21/20  0643 12/21/20  5703 12/18/20  1857 12/18/20  1706 12/18/20  1653   BLOOD CULTURE   --   --  Received in Microbiology Lab  Culture in Progress  Received in Microbiology Lab  Culture in Progress  --  No Growth at 72 hrs   Staphylococcus aureus*   GRAM STAIN RESULT  No Polys or Bacteria seen No Polys  No organisms seen  --   --  Rare Polys*  3+ Gram positive cocci in clusters*  --  Gram positive cocci in clusters*   WOUND CULTURE   --   --   --   --  2+ Growth of Enterobacter aerogenes*  4+ Growth of Staphylococcus aureus*  --   --              Results from last 7 days   Lab Units 12/19/20  0520   FERRITIN ng/mL 52

## 2020-12-22 NOTE — PLAN OF CARE
Problem: Potential for Falls  Goal: Patient will remain free of falls  Description: INTERVENTIONS:  - Assess patient frequently for physical needs  -  Identify cognitive and physical deficits and behaviors that affect risk of falls    -  Mark fall precautions as indicated by assessment   - Educate patient/family on patient safety including physical limitations  - Instruct patient to call for assistance with activity based on assessment  - Modify environment to reduce risk of injury  - Consider OT/PT consult to assist with strengthening/mobility  Outcome: Progressing     Problem: MUSCULOSKELETAL - ADULT  Goal: Maintain or return mobility to safest level of function  Description: INTERVENTIONS:  - Assess patient's ability to carry out ADLs; assess patient's baseline for ADL function and identify physical deficits which impact ability to perform ADLs (bathing, care of mouth/teeth, toileting, grooming, dressing, etc )  - Assess/evaluate cause of self-care deficits   - Assess range of motion  - Assess patient's mobility  - Assess patient's need for assistive devices and provide as appropriate  - Encourage maximum independence but intervene and supervise when necessary  - Involve family in performance of ADLs  - Assess for home care needs following discharge   - Consider OT consult to assist with ADL evaluation and planning for discharge  - Provide patient education as appropriate  Outcome: Progressing  Goal: Maintain proper alignment of affected body part  Description: INTERVENTIONS:  - Support, maintain and protect limb and body alignment  - Provide patient/ family with appropriate education  Outcome: Progressing     Problem: PAIN - ADULT  Goal: Verbalizes/displays adequate comfort level or baseline comfort level  Description: Interventions:  - Encourage patient to monitor pain and request assistance  - Assess pain using appropriate pain scale  - Administer analgesics based on type and severity of pain and evaluate response  - Implement non-pharmacological measures as appropriate and evaluate response  - Consider cultural and social influences on pain and pain management  - Notify physician/advanced practitioner if interventions unsuccessful or patient reports new pain  Outcome: Progressing     Problem: INFECTION - ADULT  Goal: Absence or prevention of progression during hospitalization  Description: INTERVENTIONS:  - Assess and monitor for signs and symptoms of infection  - Monitor lab/diagnostic results  - Monitor all insertion sites, i e  indwelling lines, tubes, and drains  - Monitor endotracheal if appropriate and nasal secretions for changes in amount and color  - [place] appropriate cooling/warming therapies per order  - Administer medications as ordered  - Instruct and encourage patient and family to use good hand hygiene technique  - Identify and instruct in appropriate isolation precautions for identified infection/condition  Outcome: Progressing  Goal: Absence of fever/infection during neutropenic period  Description: INTERVENTIONS:  - Monitor WBC    Outcome: Progressing     Problem: SAFETY ADULT  Goal: Patient will remain free of falls  Description: INTERVENTIONS:  - Assess patient frequently for physical needs  -  Identify cognitive and physical deficits and behaviors that affect risk of falls    -  [place] fall precautions as indicated by assessment   - Educate patient/family on patient safety including physical limitations  - Instruct patient to call for assistance with activity based on assessment  - Modify environment to reduce risk of injury  - Consider OT/PT consult to assist with strengthening/mobility  Outcome: Progressing  Goal: Maintain or return to baseline ADL function  Description: INTERVENTIONS:  -  Assess patient's ability to carry out ADLs; assess patient's baseline for ADL function and identify physical deficits which impact ability to perform ADLs (bathing, care of mouth/teeth, toileting, grooming, dressing, etc )  - Assess/evaluate cause of self-care deficits   - Assess range of motion  - Assess patient's mobility; develop plan if impaired  - Assess patient's need for assistive devices and provide as appropriate  - Encourage maximum independence but intervene and supervise when necessary  - Involve family in performance of ADLs  - Assess for home care needs following discharge   - Consider OT consult to assist with ADL evaluation and planning for discharge  - Provide patient education as appropriate  Outcome: Progressing  Goal: Maintain or return mobility status to optimal level  Description: INTERVENTIONS:  - Assess patient's baseline mobility status (ambulation, transfers, stairs, etc )    - Identify cognitive and physical deficits and behaviors that affect mobility  - Identify mobility aids required to assist with transfers and/or ambulation (gait belt, sit-to-stand, lift, walker, cane, etc )  - [place] fall precautions as indicated by assessment  - Record patient progress and toleration of activity level on Mobility SBAR; progress patient to next Phase/Stage  - Instruct patient to call for assistance with activity based on assessment  - Consider rehabilitation consult to assist with strengthening/weightbearing, etc   Outcome: Progressing     Problem: DISCHARGE PLANNING  Goal: Discharge to home or other facility with appropriate resources  Description: INTERVENTIONS:  - Identify barriers to discharge w/patient and caregiver  - Arrange for needed discharge resources and transportation as appropriate  - Identify discharge learning needs (meds, wound care, etc )  - Arrange for interpretive services to assist at discharge as needed  - Refer to Case Management Department for coordinating discharge planning if the patient needs post-hospital services based on physician/advanced practitioner order or complex needs related to functional status, cognitive ability, or social support system  Outcome: Progressing     Problem: Knowledge Deficit  Goal: Patient/family/caregiver demonstrates understanding of disease process, treatment plan, medications, and discharge instructions  Description: Complete learning assessment and assess knowledge base    Interventions:  - Provide teaching at level of understanding  - Provide teaching via preferred learning methods  Outcome: Progressing

## 2020-12-22 NOTE — PROGRESS NOTES
Vascular Surgery    Progress Note - Lana Ramos 1945, 66 y o  male MRN: [id number]    Unit/Bed#: E5 -01 Encounter: [id number]    Primary Care Provider: James Raman MD   Date and time admitted to hospital: 12/18/2020 11:52 AM    Assessment/Plan:     * Subacute osteomyelitis of left foot [facility])  Assessment & Plan  66year old male with CAD s/p PCI (2019), HTN, HLD, CKD (baseline Cr 2 3-2 6), DM2, hx of RYGB presenting with LLE wound with underlying OM  POD#1 s/p 5th ray resection with podiatry    Diagnostics:  LEADs - Unreliable  Biphasic/Triphasic waveforms noted in distal tibials   XR foot (left) - Cortical destruction along the lateral aspect of the 5th metatarsal head, consistent with osteomyelitis  MRI - Evidence of OM  BC - Negative x72 hours    Recommendations:   -Patient with nonhealing wound to left lower extremity with underlying OM   -LEADs previously performed in October at [facility] suggesting adequate healing potential however will repeat LEADs this admission to reassess and view waveforms   -Patient is POD#1 s/p 5th ray resection  Op note reports adequate bleeding   -BC negative x72 hours   -LEADs unreliable but good biphasic/triphasic waveforms in the distal tibials   -Continue abx per primary  -Will sign off and follow up as an outpatient to assess healing progress  Patient may eventually require an arteriogram       Subjective: Patient offers no complaints  Some pain after surgery in the left foot  Overall doing well  Vitals:  /81 (BP Location: Right arm)   Pulse 66   Temp 97 9 °F (36 6 °C) (Temporal)   Resp 18   SpO2 98%     I/Os:  I/O last 3 completed shifts: In: 2508 3 [I V :2508 3]  Out: 2000 [Urine:2000]  No intake/output data recorded        Lab Results and Cultures:   CBC with diff: Lab Results   Component Value Date    WBC 10 94 (H) 12/22/2020    HGB 9 1 (L) 12/22/2020    HCT 28 2 (L) 12/22/2020    MCV 94 12/22/2020     (L) 12/22/2020    MCH 30 3 12/22/2020 MCHC 32 3 12/22/2020    RDW 13 5 12/22/2020    MPV 11 1 12/22/2020    NRBC 0 12/22/2020   ,   BMP/CMP:  Lab Results   Component Value Date    SODIUM 137 12/22/2020    K 4 3 12/22/2020    K 4 5 09/03/2015     12/22/2020     09/03/2015    CO2 22 12/22/2020    CO2 28 6 09/03/2015    ANIONGAP 5 09/03/2015    BUN 45 (H) 12/22/2020    BUN 19 09/03/2015    CREATININE 2 40 (H) 12/22/2020    CREATININE 1 37 (H) 09/03/2015    GLUCOSE 203 (H) 09/03/2015    CALCIUM 8 6 12/22/2020    CALCIUM 9 0 09/03/2015    AST 22 12/18/2020    AST 56 (H) 07/26/2015    ALT 61 12/18/2020    ALT 84 (H) 07/26/2015    ALKPHOS 115 12/18/2020    ALKPHOS 93 07/26/2015    PROT 6 4 07/26/2015    BILITOT 0 50 07/26/2015    EGFR 29 12/22/2020   ,   Lipid Panel:   Lab Results   Component Value Date    CHOL 139 03/17/2015   ,   Coags:   Lab Results   Component Value Date    PTT 30 09/19/2018    PTT 32 09/03/2015    INR 0 98 02/03/2019    INR 0 93 09/03/2015   ,     Blood Culture:   Lab Results   Component Value Date    BLOODCX Received in Microbiology Lab  Culture in Progress   12/21/2020   ,   Urinalysis: Lab Results   Component Value Date    COLORU Yellow 08/17/2020    COLORU Yellow 07/18/2016    CLARITYU Cloudy 08/17/2020    CLARITYU Transparent 07/18/2016    SPECGRAV 1 018 08/17/2020    SPECGRAV 1 020 07/18/2016    PHUR 6 0 08/17/2020    PHUR 5 5 06/29/2020    PHUR 6 0 07/18/2016    LEUKOCYTESUR Large (A) 08/17/2020    LEUKOCYTESUR - 07/18/2016    NITRITE Negative 08/17/2020    NITRITE - 07/18/2016    PROTEINUA - 07/18/2016    GLUCOSEU Negative 08/17/2020    GLUCOSEU 500 (1/2%) (A) 09/24/2015    KETONESU Negative 08/17/2020    KETONESU - 07/18/2016    BILIRUBINUR Negative 08/17/2020    BILIRUBINUR - 01/18/2016    BLOODU Moderate (A) 08/17/2020    BLOODU trace 07/18/2016   ,   Urine Culture:   Lab Results   Component Value Date    URINECX No Growth <1000 cfu/mL 11/16/2020   ,   Wound Culure:   Lab Results   Component Value Date    [facility]  2+ Growth of Enterobacter aerogenes (A) 12/18/2020    WOUNDCULT 4+ Growth of Staphylococcus aureus (A) 12/18/2020       Medications:  Current Facility-Administered Medications   Medication Dose Route Frequency    acetaminophen (TYLENOL) tablet 975 mg  975 mg Oral Q6H [facility]    ALPRAZolam (XANAX) tablet 0 25 mg  0 25 mg Oral HS PRN    aspirin chewable tablet 81 mg  81 mg Oral Daily    cefepime (MAXIPIME) 2,000 mg in dextrose 5 % 50 mL IVPB  2,000 mg Intravenous Q12H    fluticasone (FLONASE) 50 mcg/act nasal spray 1 spray  1 spray Each Nare Daily    gabapentin (NEURONTIN) capsule 600 mg  600 mg Oral Daily    heparin (porcine) subcutaneous injection 5,000 Units  5,000 Units Subcutaneous Q12H [facility]    insulin glargine (LANTUS) subcutaneous injection 20 Units 0 2 mL  20 Units Subcutaneous HS    insulin lispro (HumaLOG) 100 units/mL subcutaneous injection 1-5 Units  1-5 Units Subcutaneous 4x Daily (AC & HS)    isosorbide mononitrate (IMDUR) 24 hr tablet 90 mg  90 mg Oral Daily    latanoprost (XALATAN) 0 005 % ophthalmic solution 1 drop  1 drop Both Eyes HS    melatonin tablet 3 mg  3 mg Oral HS PRN    metoprolol succinate (TOPROL-XL) 24 hr tablet 100 mg  100 mg Oral Daily    metroNIDAZOLE (FLAGYL) tablet 500 mg  500 mg Oral Q8H    ondansetron (ZOFRAN) injection 4 mg  4 mg Intravenous Q6H PRN    oxyCODONE (ROXICODONE) IR tablet 5 mg  5 mg Oral Q6H PRN    pantoprazole (PROTONIX) EC tablet 40 mg  40 mg Oral Early Morning    polyethylene glycol (MIRALAX) packet 17 g  17 g Oral Daily    senna-docusate sodium (SENOKOT S) 8 6-50 mg per tablet 1 tablet  1 tablet Oral HS    sertraline (ZOLOFT) tablet 50 mg  50 mg Oral Daily    sodium chloride 0 9 % infusion  50 mL/hr Intravenous Continuous    tamsulosin (FLOMAX) capsule 0 4 mg  0 4 mg Oral HS    traMADol (ULTRAM) tablet 50 mg  50 mg Oral Q12H PRN       Imaging:  As noted above    Physical Exam:    General: Alert and oriented, in no acute distress  CV: Regular rate and rhythm   Respiratory: Clear to ausculation, normal effort   Abdominal: Soft, nontender  Extremities: LLE with bandage in place     Neurologic: Grossly normal     Caitie Ackerman PA-C  12/22/2020

## 2020-12-22 NOTE — ASSESSMENT & PLAN NOTE
Mild acute kidney injury on CKD stage IV, - creatinine peaked at 2 8 with baseline creatinine appearing 2 4  - continue to hold lasix for now  - Might require renal evaluation if vascular intervention is planned  - Renally dose medications      Recent Labs     12/19/20  0520 12/20/20  0607 12/21/20  0641   BUN 49* 52* 54*   CREATININE 2 42* 2 86* 2 67*   EGFR 29 23 25       Results from last 7 days   Lab Units 12/20/20  1447   SODIUM UR  36   CREATININE UR mg/dL 100 8

## 2020-12-22 NOTE — OCCUPATIONAL THERAPY NOTE
Occupational Therapy Evaluation     Patient Name: Layton Benz  Today's Date: 12/22/2020  Problem List  Principal Problem:    Subacute osteomyelitis of left foot ([facility] )  Active Problems:    CAD (coronary artery disease)    Hypertension with renal disease    Hyperlipidemia    Presence of stent in coronary artery    Type 2 diabetes mellitus, with long-term current use of insulin ([facility])    Acute kidney injury superimposed on chronic kidney disease ([facility] )    Anemia of chronic disease    Preoperative examination    PVD (peripheral vascular disease) ([email or web address] )    Left carotid bruit    MSSA bacteremia    Past Medical History  Past Medical History:   Diagnosis Date    Anemia     Last assessed: 9/28/17    Arteriosclerotic cardiovascular disease     Last assessed: 9/28/17    Bladder cancer ([place] [address] )     bladder    CAD (coronary artery disease)     Chronic kidney disease     CKD (chronic kidney disease) stage 4, GFR 15-29 ml/min ([facility])     Colon polyp     Diabetes mellitus ([email or web address] )     Foot ulcer ([facility] ) 09/2020    left foot- treated with antibiotics, followed by podiatrist    GERD (gastroesophageal reflux disease)     Hypertension     Hypotension     Last assessed: 2/24/15    Insomnia     Last assessed: 11/14/12    Loss of hearing     has hearing aids but usually does not wear them    Other seasonal allergic rhinitis     Last assessed: 2/10/16    Shortness of breath     Transient cerebral ischemia     Wears glasses      Past Surgical History  Past Surgical History:   Procedure Laterality Date    CARDIAC SURGERY      Cath stent placement  Last assessed: 3/9/17  Interventional Catheterization    CHOLECYSTECTOMY      COLONOSCOPY      CYSTOSCOPY      Diagnostic w/biopsy  Marisela Mohan  Last assessed: 12/1/14    CYSTOURETHROSCOPY      w/cautery  Marisela Mohan    GASTRIC BYPASS      For morbid obesity w/Shaji-en-Y   Resolved: 11/17/09    INCISION AND DRAINAGE OF WOUND Right 2/26/2017    Procedure: INCISION AND DRAINAGE (I&D) EXTREMITY WITH APPLICATION OF GRAFT JACKET;  Surgeon: Natasha Garcia DPM;  [facility];  Service:     INCISION AND DRAINAGE OF WOUND Right 4/25/2017    Procedure: INCISION AND DRAINAGE (I&D) EXTREMITY, APPLICATION OF GRAFT;  Surgeon: Natasha Garcia DPM;  [facility];  Service:     IR BIOPSY OTHER  7/2/2020    JOINT REPLACEMENT      Knee  Last assessed: 1/28/11    ND AMPUTATION METATARSAL+TOE,SINGLE Left 12/21/2020    Procedure: RAY RESECTION FOOT;  Surgeon: Zachary Sherman DPM;  [facility];  Service: Podiatry    ND CYSTOURETHROSCOPY,BIOPSY N/A 8/16/2016    Procedure: CYSTOSCOPY WITH BIOPSIES;  Surgeon: Jimmy Gutierrez MD;  [facility];  Service: Urology    ND CYSTOURETHROSCOPY,FULGUR <0 5 CM LESN N/A 11/19/2020    Procedure: CYSTO W/BIOPSIES, transurethral prostate bx;  Surgeon: Sukumar Ochoa MD;  [facility];  Service: Urology    ROTATOR CUFF REPAIR      SMALL INTESTINE SURGERY      Surgery Shaji-en-Y    SPINAL FUSION      lumbar and cervical fusions    VAC DRESSING APPLICATION Right 8/37/2597    Procedure: APPLICATION VAC DRESSING;  Surgeon: Natasha Garcia DPM;  [facility];  Service:            12/22/20 0906   Note Type   Note type Evaluation   Restrictions/Precautions   Weight Bearing Precautions Per Order Yes   LLE Weight Bearing Per Order NWB   Other Precautions WBS; Fall Risk;Pain;Multiple lines   Pain Assessment   Pain Assessment Tool 0-10   Pain Score 7   Pain Location/Orientation Orientation: Left; Location: Foot   Hospital Pain Intervention(s) Repositioned; Ambulation/increased activity; Emotional support;Rest;Elevated   Multiple Pain Sites No   Home Living   Type of [address] [place] [address] Multi-level;1/2 bath on main level;Bed/bath upstairs  (ramped entrance through garage, stair glide to 2nd floor)   Bathroom Shower/Tub   (tub/shower on 2nd, walk-in shower in basement)   Bathroom Toilet   (standard toilet on 2nd, raised toilet in basement)   [address] chair   [address] Walker;Cane   Additional Comments Pt lives with spouse in a multi-level house with a ramped entrance through garage and stair glide to 2nd floor bed/bath  Pt reports spouse unable to provide physical assistance in home environment  Prior Function   Level of [place] Independent with ADLs and functional mobility   Lives With Spouse   Receives Help From Family   ADL Assistance Independent   IADLs Independent   Falls in the last 6 months 1 to 4  (3 per pt report 2* balance deficits)   Vocational Retired   Comments At baseline, pt was I w/ ADLs, IADLs, and functional mobility/transfers w/o use of AD and reports 3 falls PTA 2* balance deficits  Lifestyle   Autonomy At baseline, pt was I w/ ADLs, IADLs, and functional mobility/transfers w/o use of AD and reports 3 falls PTA 2* balance deficits  Reciprocal Relationships Spouse   Service to Others Retired   Intrinsic Gratification Watching TV   Psychosocial   Psychosocial (WDL) WDL   ADL   Where Assessed Chair   Eating Assistance 5  Supervision/Setup   Grooming Assistance 5  [address] [facility] 5  Supervision/Setup   LB [address] 4  [address] 5  Supervision/Setup    [facility] 4  [address] [place]   4  [address] [place] [address]  Minimal Assistance   Bed Mobility   Supine to Sit 5  Supervision   Additional items HOB elevated; Bedrails; Increased time required;Verbal cues   Sit to Supine Unable to assess   Additional Comments Pt seated OOB in chair at end of session w/ call bell and phone within reach  B/L LEs elevated  All needs met and pt reports no further questions for OT at this time   Transfers   Sit to Stand 5  Supervision   Additional items Bedrails; Increased time required;Verbal cues   Stand to Sit 5  Supervision   Additional items Armrests; Increased time required;Verbal cues   Additional Comments Cues for safe technique and hand placement   Functional Mobility   Functional Mobility 4  Minimal assistance   Additional Comments Assist x1; Cues for WBS   Additional items Rolling walker   Balance   Static Sitting Fair +   Dynamic Sitting Fair   Static Standing Fair -   Dynamic Standing Poor +   Ambulatory Poor +   Activity Tolerance   Activity Tolerance Patient limited by fatigue;Patient limited by pain   Medical Staff [address] [facility], PT   Nurse Made Aware yes; Ana Laura Dowell RN   RUE Assessment   RUE Assessment [facility]   RUE Strength   RUE Overall Strength Within Functional Limits - able to perform ADL tasks with strength  (4/5 throughout)   LUE Assessment   LUE Assessment WFL   LUE Strength   LUE Overall Strength Within Functional Limits - able to perform ADL tasks with strength  (4/5 throughout)   Hand Function   Gross Motor Coordination Functional   Fine Motor Coordination Functional   Sensation   Light Touch Partial deficits in the RUE;Partial deficits in the RLE;Partial deficits in the LLE   Proprioception   Proprioception No apparent deficits   Vision - Complex Assessment   Ocular Range of Motion [facility]   Acuity Able to read clock/calendar on wall without difficulty; Able to read employee name badge without difficulty   Perception   Inattention/Neglect Appears intact   Cognition   Overall Cognitive Status [facility]   Arousal/Participation Alert; Cooperative   Attention Within functional limits   Orientation Level Oriented X4   Memory Within functional limits   Following Commands Follows all commands and directions without difficulty   Comments Pleasant and cooperative; Engages in conversation appropriately   Assessment   Limitation Decreased ADL status; Decreased endurance;Decreased self-care trans;Decreased high-level ADLs; Decreased Safe judgement during ADL   Prognosis Good   Assessment Pt is a 66 y o  male seen for OT evaluation s/p [facility] 12/18[facility] w/ L foot cellulitis and underlying OM of 5th metatarsal now s/p L foot partial 5th ray amputation on 12/21/2020  NWB L LE  Comorbidities affecting pts functional performance include a significant PMH of Anemia, Bladder CA, CAD, CKD stage 4, DM, HTN, Hypotension, Insomnia, SOB, and transient cerebral ischemia  Pt with active OT orders  Pt lives with spouse in a multi-level house with a ramped entrance through garage and stair glide to 2nd floor bed/bath  Pt reports spouse unable to provide physical assistance in home environment  At baseline, pt was I w/ ADLs, IADLs, and functional mobility/transfers w/o use of AD and reports 3 falls PTA 2* balance deficits  Upon evaluation, pt currently requires Supervision for UB ADLs, Min A for LB ADLs, Min A for toileting, Supervision for bed mobility, Supervision for transfers, and Min A for functional mobility 2* the following deficits impacting occupational performance: weakness, decreased strength, decreased balance, decreased tolerance, decreased safety awareness and increased pain  These impairments, as well at pts limited home support, difficulty performing ADLS and difficulty performing IADLS  limit pts ability to safely engage in all baseline areas of occupation  Pt scored overall 55/100 on the Barthel Index  Based on the aforementioned OT evaluation, functional performance deficits, and assessments, pt has been identified as a Moderate complexity evaluation  Pt to continue to benefit from continued acute OT services during hospital stay to address defined deficits and to maximize level of functional independence in the following Occupational Performance areas: grooming, bathing/shower, toilet hygiene, dressing, medication management, health maintenance, functional mobility, community mobility, clothing management, cleaning, meal prep and household maintenance  From OT standpoint, recommend STR upon D/C   OT will continue to follow pt 3-5x/wk to address the following goals to  w/in 10-14 days:   Goals   Patient Goals To go to rehab   LTG Time Frame 10-14   Long Term Goal Please refer to LTGs listed below   Plan   Treatment Interventions ADL retraining;Functional transfer training;UE strengthening/ROM; Endurance training;Patient/family training;Equipment evaluation/education; Compensatory technique education; Energy conservation; Activityengagement   Goal Expiration Date 21   OT Treatment Day 0   OT Frequency 3-5x/wk   Recommendation   OT Discharge Recommendation Post-Acute Rehabilitation Services   OT - OK to Discharge Yes  (when medically cleared to rehab)   Barthel Index   Feeding 10   Bathing 0   Grooming Score 5   Dressing Score 5   Bladder Score 10   Bowels Score 10   Toilet Use Score 5   Transfers (Bed/Chair) Score 10   Mobility (Level Surface) Score 0   Stairs Score 0   Barthel Index Score 55   Modified Converse Scale   Modified Converse Scale 4        GOALS    1  Pt will improve activity tolerance to G for min 30 min txment sessions for increase engagement in functional tasks    2  Pt will complete bed mobility at a Mod I level w/ G balance/safety demonstrated to decrease caregiver assistance required     3  Pt will complete UB/LB dressing/self care w/ mod I using adaptive device and DME as needed    4  Pt will complete bathing w/ Mod I w/ use of AE and DME as needed    5  Pt will complete toileting w/ mod I w/ G hygiene/thoroughness using DME as needed    6  Pt will improve functional transfers to Mod I on/off all surfaces using DME as needed w/ G balance/safety     7  Pt will improve functional mobility during ADL/IADL/leisure tasks to Mod I using DME as needed w/ G balance/safety     8  Pt will be attentive 100% of the time during ongoing cognitive assessment w/ G participation to assist w/ safe d/c planning/recommendations    9   Pt will demonstrate G carryover of pt/caregiver education and training as appropriate w/o cues w/ good tolerance to increase safety during functional tasks    10  Pt will participate in simulated IADL management task to increase independence to Mod I w/ G safety and endurance    11  Pt will verbalize 3 potential fall hazards and identify appropriate compensatory techniques to decrease fall risk in home environment     12  Pt will increase standing tolerance to 8-10 mins with Fair+ dynamic standing balance to increase safety during participation in ADLs     13   Pt will demonstrate 100% adherence to WBS during all functional activities w/o cues from therapist       MARTIN Rothman/L

## 2020-12-22 NOTE — PROGRESS NOTES
Progress Note - Molly Douglas 1945, 66 y o  male MRN: [id number]    Unit/Bed#: E5 -01 Encounter: [id number]    Primary Care Provider: Lorrain Scheuermann, MD   Date and time admitted to hospital: 12/18/2020 11:52 AM        * Subacute osteomyelitis of left foot [facility])  Assessment & Plan  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 fifth proximal phalangeal base  - s/p RAY RESECTION FOOT (Left) by Podiatry 12/21/2020  - vascular surgery following  - ID consulted after cultures came back positive for Enterobacter and Staph aureus with blood cultures positive for MSSA   - Currently on Cefepime and Flagyl as per ID - per ID can likely simplify antibiotics from cefepime to cefazolin tomorrow, 12/22/2020    Results from last 7 days   Lab Units 12/18/20  1857 12/18/20  1706 12/18/20  1653   BLOOD CULTURE   --  No Growth at 24 hrs  Staphylococcus aureus*   GRAM STAIN RESULT  Rare Polys*  3+ Gram positive cocci in clusters*  --  Gram positive cocci in clusters*               MSSA bacteremia  Assessment & Plan  One of 2 sets from 12/18/2020  Felt to be transient bacteremia in setting of osteomyelitis, Infectious Disease following  Patient currently on Flagyl and cefepime, likely plan to transition to cephazolin from cefepime tomorrow    Acute kidney injury superimposed on chronic kidney disease ([facility] [address] )  Assessment & Plan  Mild acute kidney injury on CKD stage IV, - creatinine peaked at 2 8 with baseline creatinine appearing 2 4  - continue to hold lasix for now  - Might require renal evaluation if vascular intervention is planned  - Renally dose medications      Recent Labs     12/19/20  0520 12/20/20  0607 12/21/20  0641   BUN 49* 52* 54*   CREATININE 2 42* 2 86* 2 67*   EGFR 29 23 25       Results from last 7 days   Lab Units 12/20/20  1447   SODIUM UR  36   CREATININE UR mg/dL 100 8         Type 2 diabetes mellitus, with long-term current use of insulin [facility])  Assessment & Plan  Lab Results   Component Value Date    HGBA1C 7 4 (H) 2020       Recent Labs     20  1607 20  2044 20  0723 20  1110   POCGLU 183* 276* 119 130       Blood Sugar Average: Last 72 hrs:  (P) 680 [id number]     Acceptable control with hemoglobin A1c of 7 4  Home Regimen is 27U HS and 10-12U TID AC  - with diet resumed, will increase Lantus to 20 units HS, hold scheduled t i d  Humalog and proceed with Accu-Cheks and insulin sliding scale    Hypertension with renal disease  Assessment & Plan  Continue metoprolol  Lasix on hold     CAD (coronary artery disease)  Assessment & Plan  Continue aspirin, metoprolol and Imdur    VTE Pharmacologic Prophylaxis:   Pharmacologic: Heparin  Mechanical VTE Prophylaxis in Place: Yes    Patient Centered Rounds: I have performed bedside rounds with nursing staff today  Discussions with Specialists or Other Care Team Provider:  Case management    Education and Discussions with Family / Patient:  Patient    Time Spent for Care: 45 minutes  More than 50% of total time spent on counseling and coordination of care as described above  Current Length of Stay: 3 day(s)    Current Patient Status: Inpatient   Certification Statement: The patient will continue to require additional inpatient hospital stay due to Per primary team    Discharge Plan:  Per primary team, podiatry    Code Status: Level 1 - Full Code      Subjective:   Patient seen and examined  He is seen prior to his surgical procedure  Voices no complaints  No overnight events  Objective:     Vitals:   Temp (24hrs), Av °F (36 7 °C), Min:97 5 °F (36 4 °C), Max:99 °F (37 2 °C)    Temp:  [97 5 °F (36 4 °C)-99 °F (37 2 °C)] 97 6 °F (36 4 °C)  HR:  [60-75] 63  Resp:  [10-18] 18  BP: (119-148)/(64-85) 136/84  SpO2:  [97 %-100 %] 100 %  There is no height or weight on file to calculate BMI  Input and Output Summary (last 24 hours):        Intake/Output Summary (Last 24 hours) at 12/21/2020 1917  Last data filed at 12/21/2020 1630  Gross per 24 hour   Intake 2508 33 ml   Output 2000 ml   Net 508 33 ml       Physical Exam:     Physical Exam  Constitutional:       General: He is not in acute distress  HENT:      Head: Normocephalic and atraumatic  Mouth/Throat:      Pharynx: Oropharynx is clear  Eyes:      Conjunctiva/sclera: Conjunctivae normal    Cardiovascular:      Rate and Rhythm: Normal rate and regular rhythm  Pulmonary:      Effort: No respiratory distress  Abdominal:      General: There is no distension  Musculoskeletal:      Right lower leg: No edema  Left lower leg: No edema  Skin:     General: Skin is warm and dry  Neurological:      Mental Status: He is oriented to person, place, and time  Psychiatric:         Mood and Affect: Mood normal          Additional Data:     Labs:    Results from last 7 days   Lab Units 12/21/20  0641   WBC Thousand/uL 11 99*   HEMOGLOBIN g/dL 10 1*   HEMATOCRIT % 30 6*   PLATELETS Thousands/uL 143*   NEUTROS PCT % 80*   LYMPHS PCT % 10*   MONOS PCT % 8   EOS PCT % 1     Results from last 7 days   Lab Units 12/21/20  0641  12/18/20  1705   SODIUM mmol/L 136   < > 140   POTASSIUM mmol/L 4 5   < > 5 0   CHLORIDE mmol/L 104   < > 106   CO2 mmol/L 23   < > 27   BUN mg/dL 54*   < > 50*   CREATININE mg/dL 2 67*   < > 2 47*   ANION GAP mmol/L 9   < > 7   CALCIUM mg/dL 8 7   < > 8 9   ALBUMIN g/dL  --   --  3 5   TOTAL BILIRUBIN mg/dL  --   --  0 46   ALK PHOS U/L  --   --  115   ALT U/L  --   --  61   AST U/L  --   --  22   GLUCOSE RANDOM mg/dL 119   < > 121    < > = values in this interval not displayed           Results from last 7 days   Lab Units 12/21/20  1110 12/21/20  0723 12/20/20  2044 12/20/20  1607 12/20/20  1101 12/20/20  0712 12/19/20  2119 12/19/20  1530 12/19/20  1100 12/19/20  0720 12/18/20  2128 12/18/20  1637   POC GLUCOSE mg/dl 130 119 276* 183* 174* 103 159* 177* 223* 107 141* 121     Results from last 7 days   Lab Units 12/18/20  1652   HEMOGLOBIN A1C % 7 4*               * I Have Reviewed All Lab Data Listed Above  * Additional Pertinent Lab Tests Reviewed: [address] 64 Admission Reviewed    Imaging:    Imaging Reports Reviewed Today Include: MRI left foot       Recent Cultures (last 7 days):     Results from last 7 days   Lab Units 12/21/20  0643 12/21/20  2914 12/18/20  1857 12/18/20  1706 12/18/20  1653   BLOOD CULTURE  Received in Microbiology Lab  Culture in Progress  Received in Microbiology Lab  Culture in Progress  --  No Growth at 48 hrs   Staphylococcus aureus*   GRAM STAIN RESULT   --   --  Rare Polys*  3+ Gram positive cocci in clusters*  --  Gram positive cocci in clusters*   WOUND CULTURE   --   --  2+ Growth of Enterobacter aerogenes*  4+ Growth of Staphylococcus aureus*  --   --        Last 24 Hours Medication List:   Current Facility-Administered Medications   Medication Dose Route Frequency Provider Last Rate    acetaminophen  975 mg Oral Q6H [facility] Amy Dove DPM      ALPRAZolam  0 25 mg Oral HS PRN Amy Dove DPM      aspirin  81 mg Oral Daily Amy Dove DPM      cefazolin  1,000 mg Intravenous On Call To OR Amy Dove DPM      cefepime  2,000 mg Intravenous Q12H Amy Dove DPM 2,000 mg (12/21/20 1153)    fluticasone  1 spray Each Nare Daily Amy Dove DPM      gabapentin  600 mg Oral Daily Amy Dove DPM      heparin (porcine)  5,000 Units Subcutaneous Q12H [facility] Amy Dove DPM      insulin glargine  20 Units Subcutaneous HS Khushbu Ferris MD      insulin lispro  1-5 Units Subcutaneous 4x Daily (AC & HS) Amy Dove DPM      isosorbide mononitrate  90 mg Oral Daily Amy Dove DPM      latanoprost  1 drop Both Eyes HS Amy Dove DPM      melatonin  3 mg Oral HS PRN Amy Dove DPM      metoprolol succinate  100 mg Oral Daily Amy Dove DPM      metroNIDAZOLE  500 mg Oral Q8H Amy Dove DPM      oxyCODONE 5 mg Oral Q6H PRN Amy Endo, DPM      pantoprazole  40 mg Oral Early Morning Amy Endo, DPM      polyethylene glycol  17 g Oral Daily Amy Endo, DPM      senna-docusate sodium  1 tablet Oral HS Amy Endo, DPM      sertraline  50 mg Oral Daily Amy Endo, DPM      sodium chloride  50 mL/hr Intravenous Continuous Amy Endo, DPM 50 mL/hr (12/21/20 0806)    tamsulosin  0 4 mg Oral Daily With Tor Jeffrey DPM      traMADol  50 mg Oral Q12H PRN Amy Dove DPM          Today, Patient Was Seen By: Eliceo Medrano MD    ** Please Note: Dictation voice to text software may have been used in the creation of this document   **

## 2020-12-23 LAB
ANION GAP SERPL CALCULATED.3IONS-SCNC: 7 MMOL/L (ref 4–13)
BACTERIA BLD CULT: NORMAL
BACTERIA SPEC ANAEROBE CULT: NORMAL
BASOPHILS # BLD AUTO: 0.04 THOUSANDS/ΜL (ref 0–0.1)
BASOPHILS NFR BLD AUTO: 0 % (ref 0–1)
BUN SERPL-MCNC: 41 MG/DL (ref 5–25)
CALCIUM SERPL-MCNC: 8.6 MG/DL (ref 8.3–10.1)
CHLORIDE SERPL-SCNC: 106 MMOL/L (ref 100–108)
CO2 SERPL-SCNC: 24 MMOL/L (ref 21–32)
CREAT SERPL-MCNC: 2.19 MG/DL (ref 0.6–1.3)
EOSINOPHIL # BLD AUTO: 0.22 THOUSAND/ΜL (ref 0–0.61)
EOSINOPHIL NFR BLD AUTO: 2 % (ref 0–6)
ERYTHROCYTE [DISTWIDTH] IN BLOOD BY AUTOMATED COUNT: 13.6 % (ref 11.6–15.1)
GFR SERPL CREATININE-BSD FRML MDRD: 32 ML/MIN/1.73SQ M
GLUCOSE SERPL-MCNC: 105 MG/DL (ref 65–140)
GLUCOSE SERPL-MCNC: 114 MG/DL (ref 65–140)
GLUCOSE SERPL-MCNC: 122 MG/DL (ref 65–140)
GLUCOSE SERPL-MCNC: 165 MG/DL (ref 65–140)
GLUCOSE SERPL-MCNC: 183 MG/DL (ref 65–140)
HCT VFR BLD AUTO: 28.5 % (ref 36.5–49.3)
HGB BLD-MCNC: 9.2 G/DL (ref 12–17)
IMM GRANULOCYTES # BLD AUTO: 0.07 THOUSAND/UL (ref 0–0.2)
IMM GRANULOCYTES NFR BLD AUTO: 1 % (ref 0–2)
LYMPHOCYTES # BLD AUTO: 1.41 THOUSANDS/ΜL (ref 0.6–4.47)
LYMPHOCYTES NFR BLD AUTO: 16 % (ref 14–44)
MCH RBC QN AUTO: 30.4 PG (ref 26.8–34.3)
MCHC RBC AUTO-ENTMCNC: 32.3 G/DL (ref 31.4–37.4)
MCV RBC AUTO: 94 FL (ref 82–98)
MONOCYTES # BLD AUTO: 0.98 THOUSAND/ΜL (ref 0.17–1.22)
MONOCYTES NFR BLD AUTO: 11 % (ref 4–12)
NEUTROPHILS # BLD AUTO: 6.27 THOUSANDS/ΜL (ref 1.85–7.62)
NEUTS SEG NFR BLD AUTO: 70 % (ref 43–75)
NRBC BLD AUTO-RTO: 0 /100 WBCS
PLATELET # BLD AUTO: 151 THOUSANDS/UL (ref 149–390)
PMV BLD AUTO: 10.7 FL (ref 8.9–12.7)
POTASSIUM SERPL-SCNC: 4.3 MMOL/L (ref 3.5–5.3)
RBC # BLD AUTO: 3.03 MILLION/UL (ref 3.88–5.62)
SODIUM SERPL-SCNC: 137 MMOL/L (ref 136–145)
WBC # BLD AUTO: 8.99 THOUSAND/UL (ref 4.31–10.16)

## 2020-12-23 PROCEDURE — 99232 SBSQ HOSP IP/OBS MODERATE 35: CPT | Performed by: FAMILY MEDICINE

## 2020-12-23 PROCEDURE — 82948 REAGENT STRIP/BLOOD GLUCOSE: CPT

## 2020-12-23 PROCEDURE — 99232 SBSQ HOSP IP/OBS MODERATE 35: CPT | Performed by: INTERNAL MEDICINE

## 2020-12-23 PROCEDURE — 80048 BASIC METABOLIC PNL TOTAL CA: CPT | Performed by: INTERNAL MEDICINE

## 2020-12-23 PROCEDURE — 85025 COMPLETE CBC W/AUTO DIFF WBC: CPT | Performed by: INTERNAL MEDICINE

## 2020-12-23 RX ADMIN — ASPIRIN 81 MG CHEWABLE TABLET 81 MG: 81 TABLET CHEWABLE at 08:25

## 2020-12-23 RX ADMIN — ACETAMINOPHEN 975 MG: 325 TABLET ORAL at 23:26

## 2020-12-23 RX ADMIN — METRONIDAZOLE 500 MG: 500 TABLET ORAL at 14:44

## 2020-12-23 RX ADMIN — OXYCODONE HYDROCHLORIDE 5 MG: 5 TABLET ORAL at 08:32

## 2020-12-23 RX ADMIN — CEFEPIME HYDROCHLORIDE 2000 MG: 2 INJECTION, POWDER, FOR SOLUTION INTRAVENOUS at 11:34

## 2020-12-23 RX ADMIN — TRAMADOL HYDROCHLORIDE 50 MG: 50 TABLET, FILM COATED ORAL at 06:33

## 2020-12-23 RX ADMIN — OXYCODONE HYDROCHLORIDE 5 MG: 5 TABLET ORAL at 14:44

## 2020-12-23 RX ADMIN — ACETAMINOPHEN 975 MG: 325 TABLET ORAL at 17:54

## 2020-12-23 RX ADMIN — INSULIN LISPRO 1 UNITS: 100 INJECTION, SOLUTION INTRAVENOUS; SUBCUTANEOUS at 11:34

## 2020-12-23 RX ADMIN — PANTOPRAZOLE SODIUM 40 MG: 40 TABLET, DELAYED RELEASE ORAL at 06:34

## 2020-12-23 RX ADMIN — LATANOPROST 1 DROP: 50 SOLUTION OPHTHALMIC at 21:52

## 2020-12-23 RX ADMIN — INSULIN GLARGINE 20 UNITS: 100 INJECTION, SOLUTION SUBCUTANEOUS at 21:50

## 2020-12-23 RX ADMIN — ISOSORBIDE MONONITRATE 90 MG: 30 TABLET, EXTENDED RELEASE ORAL at 08:25

## 2020-12-23 RX ADMIN — INSULIN LISPRO 1 UNITS: 100 INJECTION, SOLUTION INTRAVENOUS; SUBCUTANEOUS at 17:53

## 2020-12-23 RX ADMIN — METRONIDAZOLE 500 MG: 500 TABLET ORAL at 06:33

## 2020-12-23 RX ADMIN — METRONIDAZOLE 500 MG: 500 TABLET ORAL at 23:16

## 2020-12-23 RX ADMIN — SERTRALINE HYDROCHLORIDE 50 MG: 50 TABLET ORAL at 08:25

## 2020-12-23 RX ADMIN — CEFEPIME HYDROCHLORIDE 2000 MG: 2 INJECTION, POWDER, FOR SOLUTION INTRAVENOUS at 23:17

## 2020-12-23 RX ADMIN — METOPROLOL SUCCINATE 100 MG: 50 TABLET, EXTENDED RELEASE ORAL at 08:25

## 2020-12-23 RX ADMIN — ACETAMINOPHEN 975 MG: 325 TABLET ORAL at 11:34

## 2020-12-23 RX ADMIN — HEPARIN SODIUM 5000 UNITS: 5000 INJECTION INTRAVENOUS; SUBCUTANEOUS at 21:48

## 2020-12-23 RX ADMIN — POLYETHYLENE GLYCOL 3350 17 G: 17 POWDER, FOR SOLUTION ORAL at 08:32

## 2020-12-23 RX ADMIN — TAMSULOSIN HYDROCHLORIDE 0.4 MG: 0.4 CAPSULE ORAL at 21:51

## 2020-12-23 RX ADMIN — HEPARIN SODIUM 5000 UNITS: 5000 INJECTION INTRAVENOUS; SUBCUTANEOUS at 08:25

## 2020-12-23 RX ADMIN — GABAPENTIN 600 MG: 300 CAPSULE ORAL at 08:25

## 2020-12-23 NOTE — PLAN OF CARE
Problem: Potential for Falls  Goal: Patient will remain free of falls  Description: INTERVENTIONS:  - Assess patient frequently for physical needs  -  Identify cognitive and physical deficits and behaviors that affect risk of falls    -  [place] fall precautions as indicated by assessment   - Educate patient/family on patient safety including physical limitations  - Instruct patient to call for assistance with activity based on assessment  - Modify environment to reduce risk of injury  - Consider OT/PT consult to assist with strengthening/mobility  Outcome: Progressing     Problem: MUSCULOSKELETAL - ADULT  Goal: Maintain or return mobility to safest level of function  Description: INTERVENTIONS:  - Assess patient's ability to carry out ADLs; assess patient's baseline for ADL function and identify physical deficits which impact ability to perform ADLs (bathing, care of mouth/teeth, toileting, grooming, dressing, etc )  - Assess/evaluate cause of self-care deficits   - Assess range of motion  - Assess patient's mobility  - Assess patient's need for assistive devices and provide as appropriate  - Encourage maximum independence but intervene and supervise when necessary  - Involve family in performance of ADLs  - Assess for home care needs following discharge   - Consider OT consult to assist with ADL evaluation and planning for discharge  - Provide patient education as appropriate  Outcome: Progressing  Goal: Maintain proper alignment of affected body part  Description: INTERVENTIONS:  - Support, maintain and protect limb and body alignment  - Provide patient/ family with appropriate education  Outcome: Progressing     Problem: PAIN - ADULT  Goal: Verbalizes/displays adequate comfort level or baseline comfort level  Description: Interventions:  - Encourage patient to monitor pain and request assistance  - Assess pain using appropriate pain scale  - Administer analgesics based on type and severity of pain and evaluate response  - Implement non-pharmacological measures as appropriate and evaluate response  - Consider cultural and social influences on pain and pain management  - Notify physician/advanced practitioner if interventions unsuccessful or patient reports new pain  Outcome: Progressing     Problem: INFECTION - ADULT  Goal: Absence or prevention of progression during hospitalization  Description: INTERVENTIONS:  - Assess and monitor for signs and symptoms of infection  - Monitor lab/diagnostic results  - Monitor all insertion sites, i e  indwelling lines, tubes, and drains  - Monitor endotracheal if appropriate and nasal secretions for changes in amount and color  - [place] appropriate cooling/warming therapies per order  - Administer medications as ordered  - Instruct and encourage patient and family to use good hand hygiene technique  - Identify and instruct in appropriate isolation precautions for identified infection/condition  Outcome: Progressing  Goal: Absence of fever/infection during neutropenic period  Description: INTERVENTIONS:  - Monitor WBC    Outcome: Progressing     Problem: SAFETY ADULT  Goal: Patient will remain free of falls  Description: INTERVENTIONS:  - Assess patient frequently for physical needs  -  Identify cognitive and physical deficits and behaviors that affect risk of falls    -  [place] fall precautions as indicated by assessment   - Educate patient/family on patient safety including physical limitations  - Instruct patient to call for assistance with activity based on assessment  - Modify environment to reduce risk of injury  - Consider OT/PT consult to assist with strengthening/mobility  Outcome: Progressing  Goal: Maintain or return to baseline ADL function  Description: INTERVENTIONS:  -  Assess patient's ability to carry out ADLs; assess patient's baseline for ADL function and identify physical deficits which impact ability to perform ADLs (bathing, care of mouth/teeth, toileting, grooming, dressing, etc )  - Assess/evaluate cause of self-care deficits   - Assess range of motion  - Assess patient's mobility; develop plan if impaired  - Assess patient's need for assistive devices and provide as appropriate  - Encourage maximum independence but intervene and supervise when necessary  - Involve family in performance of ADLs  - Assess for home care needs following discharge   - Consider OT consult to assist with ADL evaluation and planning for discharge  - Provide patient education as appropriate  Outcome: Progressing  Goal: Maintain or return mobility status to optimal level  Description: INTERVENTIONS:  - Assess patient's baseline mobility status (ambulation, transfers, stairs, etc )    - Identify cognitive and physical deficits and behaviors that affect mobility  - Identify mobility aids required to assist with transfers and/or ambulation (gait belt, sit-to-stand, lift, walker, cane, etc )  - [place] fall precautions as indicated by assessment  - Record patient progress and toleration of activity level on Mobility SBAR; progress patient to next Phase/Stage  - Instruct patient to call for assistance with activity based on assessment  - Consider rehabilitation consult to assist with strengthening/weightbearing, etc   Outcome: Progressing     Problem: DISCHARGE PLANNING  Goal: Discharge to home or other facility with appropriate resources  Description: INTERVENTIONS:  - Identify barriers to discharge w/patient and caregiver  - Arrange for needed discharge resources and transportation as appropriate  - Identify discharge learning needs (meds, wound care, etc )  - Arrange for interpretive services to assist at discharge as needed  - Refer to Case Management Department for coordinating discharge planning if the patient needs post-hospital services based on physician/advanced practitioner order or complex needs related to functional status, cognitive ability, or social support system  Outcome: Progressing     Problem: Knowledge Deficit  Goal: Patient/family/caregiver demonstrates understanding of disease process, treatment plan, medications, and discharge instructions  Description: Complete learning assessment and assess knowledge base    Interventions:  - Provide teaching at level of understanding  - Provide teaching via preferred learning methods  Outcome: Progressing

## 2020-12-23 NOTE — PROGRESS NOTES
[facility] Podiatry - Progress Note  Patient: Celeste Severin 66 y o  male   MRN: [id number]  PCP: Naheed Godwin MD  Unit/Bed#: E5 -48 Encounter: [id number]  Date Of Visit: 20    ASSESSMENT:    Celeste Severin is a 66 y o  male with:    1  Left foot osteomyelitis of 5th metatarsal, clean cultures pending  2  Left foot cellulitis  3  MSSA bacteremia   4  Insulin Dependent Diabetes mellitus  5  Coronary arterial disease  6  Peripheral arterial disease  7  Hypertension      PLAN:    · Will most likely require 2 additional weeks of IV antibioitics  · Will require rehab on discharge  · NWB to LLE  · Continue IV cefipime/flagyl per ID  · Pain in left foot this AM, some improvement with ACE bandage removal  · Appreciate medication recommendations per SLIM         SUBJECTIVE:     The patient was seen, evaluated, and assessed at bedside today  The patient was awake, alert, and in no acute distress  No acute events overnight  The patient reports some pain in the left outer foot  Pain began this morning  He denies weight bearing to the foot or dependent position  Patient denies N/V/F/chills/SOB/CP  OBJECTIVE:     Vitals:   /79 (BP Location: Right arm)   Pulse 66   Temp 97 8 °F (36 6 °C) (Temporal)   Resp 18   SpO2 99%     Temp (24hrs), Av 6 °F (36 4 °C), Min:97 4 °F (36 3 °C), Max:97 8 °F (36 6 °C)      Physical Exam :     General:  Alert, cooperative, and in no distress  Lower extremity exam:  Cardiovascular status at baseline  Neurological status at baseline  Musculoskeletal status at baseline  No calf tenderness noted bilaterally       ACE bandage removed with mild sanguinous strikethrough on  kerlix    Additional Data:     Labs:    Results from last 7 days   Lab Units 20  0612   WBC Thousand/uL 8 99   HEMOGLOBIN g/dL 9 2*   HEMATOCRIT % 28 5*   PLATELETS Thousands/uL 151   NEUTROS PCT % 70   LYMPHS PCT % 16   MONOS PCT % 11   EOS PCT % 2     Results from last 7 days   Lab Units 12/23/20  0612  12/18/20  1705   POTASSIUM mmol/L 4 3   < > 5 0   CHLORIDE mmol/L 106   < > 106   CO2 mmol/L 24   < > 27   BUN mg/dL 41*   < > 50*   CREATININE mg/dL 2 19*   < > 2 47*   CALCIUM mg/dL 8 6   < > 8 9   ALK PHOS U/L  --   --  115   ALT U/L  --   --  61   AST U/L  --   --  22    < > = values in this interval not displayed  * I Have Reviewed All Lab Data Listed Above  Recent Cultures (last 7 days):     Results from last 7 days   Lab Units 12/21/20  1409 12/21/20  1408 12/21/20  0643 12/21/20  0642 12/18/20  1857 12/18/20  1706 12/18/20  1653   BLOOD CULTURE   --   --  No Growth at 24 hrs  No Growth at 24 hrs   --  No Growth After 4 Days  Staphylococcus aureus*   GRAM STAIN RESULT  No Polys or Bacteria seen No Polys  No organisms seen  --   --  Rare Polys*  3+ Gram positive cocci in clusters*  --  Gram positive cocci in clusters*   WOUND CULTURE   --  1+ Growth of Staphylococcus aureus*  Few Colonies of Gram Negative Jose Manuel*  --   --  2+ Growth of Enterobacter aerogenes*  4+ Growth of Staphylococcus aureus*  --   --      Results from last 7 days   Lab Units 12/21/20  1409 12/21/20  1408 12/18/20  1857   ANAEROBIC CULTURE  Culture results to follow  Culture results to follow  4+ Growth of   1+ Growth of Prevotella bivia*       Imaging: I have personally reviewed pertinent films in PACS  Pathology, and Other Studies: I have personally reviewed pertinent reports  ** Please Note: Portions of the record may have been created with voice recognition software  Occasional wrong word or "sound a like" substitutions may have occurred due to the inherent limitations of voice recognition software  Read the chart carefully and recognize, using context, where substitutions have occurred   **

## 2020-12-23 NOTE — PROGRESS NOTES
Progress Note - Infectious Disease   Delvis Saxena 66 y o  male MRN: [id number]  Unit/Bed#: E5 -01 Encounter: [id number]      Impression/Plan:  1  MSSA bacteremia-with only 1 of 2 sets positive   While this could represent a contaminant, the patient had the same organism isolated in the foot wound which has been worsening, therefore I suspect this is more of a transient bacteremia   Consideration for the possibility of endocarditis although I doubt   Fortunately the patient is not systemically ill, is hemodynamically stable   He seems to be tolerating the antibiotics without difficulty  Follow-up blood cultures are negative thus far  Echocardiogram without valvular vegetation  -antibiotics as below  -follow up repeat blood cultures  -additional workup as needed     2  Left foot osteomyelitis-involving primarily the 5th metatarsal but also the phalanx   Infection appears to be polymicrobial although I suspect that MSSA is the primary invasive organism   Fortunately the infection seems to be fairly well localized   patient is now status post 5th ray amputation  -continue cefepime for now at current dose  -continue Flagyl  -follow-up path margins determine duration of antibiotics  -follow up operative cultures and adjust antibiotics as needed  -recheck CBC with diff and BMP  -close podiatry follow-up  -serial exams     3   Chronic kidney disease-advanced   Renal function is  slowly improving   Possibly a pre renal issue   -dose adjusted antibiotics as above  -volume management  -recheck BMP     4  Chronic dysuria-with positive urine culture for BCG as expected post treatment for bladder cancer   The patient is now status post biopsy with mild chronic inflammation but AFB smears negative for invasive disease   AFB Culture from the biopsies negative thus far    -follow-up AFB cultures  -recheck AFB cultures  -follow-up with Urology and in the Infectious Disease office    Discussed the above management plan in detail with the primary service    Antibiotics:   Cefepime 4  Flagyl 6  Antibiotics 6  Postop day 2     Subjective:  Patient has no fever, chills, sweats; no nausea, vomiting, diarrhea; no cough, shortness of breath; having left foot pain postoperatively  No new symptoms  Objective:  Vitals:  Temp:  [97 4 °F (36 3 °C)-97 8 °F (36 6 °C)] 97 8 °F (36 6 °C)  HR:  [59-66] 66  Resp:  [18] 18  BP: (131-166)/(78-89) 142/79  SpO2:  [98 %-100 %] 99 %  Temp (24hrs), Av 6 °F (36 4 °C), Min:97 4 °F (36 3 °C), Max:97 8 °F (36 6 °C)  Current: Temperature: 97 8 °F (36 6 °C)    Physical Exam:   General Appearance:  Alert, interactive, nontoxic, no acute distress  Throat: Oropharynx moist without lesions  Lungs:   Clear to auscultation bilaterally; no wheezes, rhonchi or rales; respirations unlabored   Heart:  RRR; no murmur, rub or gallop   Abdomen:   Soft, non-tender, non-distended, positive bowel sounds  Extremities: No clubbing, cyanosis  Left foot heavily dressed with dry dressing in place  Skin: No new rashes or lesions  No draining wounds noted  Labs, Imaging, & Other studies:   All pertinent labs and imaging studies were personally reviewed  Results from last 7 days   Lab Units 20  0612 20  0506 20  0641   WBC Thousand/uL 8 99 10 94* 11 99*   HEMOGLOBIN g/dL 9 2* 9 1* 10 1*   PLATELETS Thousands/uL 151 142* 143*     Results from last 7 days   Lab Units 20  0612 20  0506 20  0641  20  1705   SODIUM mmol/L 137 137 136   < > 140   POTASSIUM mmol/L 4 3 4 3 4 5   < > 5 0   CHLORIDE mmol/L 106 104 104   < > 106   CO2 mmol/L 24 22 23   < > 27   BUN mg/dL 41* 45* 54*   < > 50*   CREATININE mg/dL 2 19* 2 40* 2 67*   < > 2 47*   EGFR ml/min/1 73sq m 32 29 25   < > 28   CALCIUM mg/dL 8 6 8 6 8 7   < > 8 9   AST U/L  --   --   --   --  22   ALT U/L  --   --   --   --  61   ALK PHOS U/L  --   --   --   --  115    < > = values in this interval not displayed       Results from last 7 days   Lab Units 12/21/20  1409 12/21/20  1408 12/21/20  0643 12/21/20  0642 12/18/20  1857 12/18/20  1706 12/18/20  1653   BLOOD CULTURE   --   --  No Growth at 24 hrs  No Growth at 24 hrs   --  No Growth After 4 Days   Staphylococcus aureus*   GRAM STAIN RESULT  No Polys or Bacteria seen No Polys  No organisms seen  --   --  Rare Polys*  3+ Gram positive cocci in clusters*  --  Gram positive cocci in clusters*   WOUND CULTURE   --  1+ Growth of Staphylococcus aureus*  Few Colonies of Gram Negative Jose Manuel*  --   --  2+ Growth of Enterobacter aerogenes*  4+ Growth of Staphylococcus aureus*  --   --              Results from last 7 days   Lab Units 12/19/20  0520   FERRITIN ng/mL 52

## 2020-12-24 ENCOUNTER — APPOINTMENT (INPATIENT)
Dept: RADIOLOGY | Facility: HOSPITAL | Age: 75
DRG: 617 | End: 2020-12-24
Attending: RADIOLOGY
Payer: MEDICARE

## 2020-12-24 LAB
ANION GAP SERPL CALCULATED.3IONS-SCNC: 8 MMOL/L (ref 4–13)
BACTERIA SPEC ANAEROBE CULT: NO GROWTH
BACTERIA WND AEROBE CULT: ABNORMAL
BACTERIA WND AEROBE CULT: ABNORMAL
BUN SERPL-MCNC: 39 MG/DL (ref 5–25)
CALCIUM SERPL-MCNC: 8.7 MG/DL (ref 8.3–10.1)
CHLORIDE SERPL-SCNC: 107 MMOL/L (ref 100–108)
CO2 SERPL-SCNC: 24 MMOL/L (ref 21–32)
CREAT SERPL-MCNC: 2.54 MG/DL (ref 0.6–1.3)
GFR SERPL CREATININE-BSD FRML MDRD: 27 ML/MIN/1.73SQ M
GLUCOSE SERPL-MCNC: 108 MG/DL (ref 65–140)
GLUCOSE SERPL-MCNC: 119 MG/DL (ref 65–140)
GLUCOSE SERPL-MCNC: 125 MG/DL (ref 65–140)
GLUCOSE SERPL-MCNC: 171 MG/DL (ref 65–140)
GLUCOSE SERPL-MCNC: 99 MG/DL (ref 65–140)
GRAM STN SPEC: ABNORMAL
GRAM STN SPEC: ABNORMAL
POTASSIUM SERPL-SCNC: 4.5 MMOL/L (ref 3.5–5.3)
SODIUM SERPL-SCNC: 139 MMOL/L (ref 136–145)

## 2020-12-24 PROCEDURE — NC001 PR NO CHARGE: Performed by: RADIOLOGY

## 2020-12-24 PROCEDURE — 02H633Z INSERTION OF INFUSION DEVICE INTO RIGHT ATRIUM, PERCUTANEOUS APPROACH: ICD-10-PCS | Performed by: RADIOLOGY

## 2020-12-24 PROCEDURE — C1751 CATH, INF, PER/CENT/MIDLINE: HCPCS

## 2020-12-24 PROCEDURE — 76937 US GUIDE VASCULAR ACCESS: CPT

## 2020-12-24 PROCEDURE — 76937 US GUIDE VASCULAR ACCESS: CPT | Performed by: RADIOLOGY

## 2020-12-24 PROCEDURE — 36558 INSERT TUNNELED CV CATH: CPT

## 2020-12-24 PROCEDURE — 77001 FLUOROGUIDE FOR VEIN DEVICE: CPT | Performed by: RADIOLOGY

## 2020-12-24 PROCEDURE — 97530 THERAPEUTIC ACTIVITIES: CPT

## 2020-12-24 PROCEDURE — 99233 SBSQ HOSP IP/OBS HIGH 50: CPT | Performed by: FAMILY MEDICINE

## 2020-12-24 PROCEDURE — 82948 REAGENT STRIP/BLOOD GLUCOSE: CPT

## 2020-12-24 PROCEDURE — 99232 SBSQ HOSP IP/OBS MODERATE 35: CPT | Performed by: INTERNAL MEDICINE

## 2020-12-24 PROCEDURE — 36558 INSERT TUNNELED CV CATH: CPT | Performed by: RADIOLOGY

## 2020-12-24 PROCEDURE — 97535 SELF CARE MNGMENT TRAINING: CPT

## 2020-12-24 PROCEDURE — 80048 BASIC METABOLIC PNL TOTAL CA: CPT | Performed by: FAMILY MEDICINE

## 2020-12-24 RX ORDER — LIDOCAINE HYDROCHLORIDE AND EPINEPHRINE 10; 10 MG/ML; UG/ML
INJECTION, SOLUTION INFILTRATION; PERINEURAL CODE/TRAUMA/SEDATION MEDICATION
Status: COMPLETED | OUTPATIENT
Start: 2020-12-24 | End: 2020-12-24

## 2020-12-24 RX ADMIN — LATANOPROST 1 DROP: 50 SOLUTION OPHTHALMIC at 22:14

## 2020-12-24 RX ADMIN — LIDOCAINE HYDROCHLORIDE,EPINEPHRINE BITARTRATE 20 ML: 10; .01 INJECTION, SOLUTION INFILTRATION; PERINEURAL at 14:27

## 2020-12-24 RX ADMIN — METRONIDAZOLE 500 MG: 500 TABLET ORAL at 08:45

## 2020-12-24 RX ADMIN — GABAPENTIN 600 MG: 300 CAPSULE ORAL at 08:45

## 2020-12-24 RX ADMIN — PANTOPRAZOLE SODIUM 40 MG: 40 TABLET, DELAYED RELEASE ORAL at 06:10

## 2020-12-24 RX ADMIN — HEPARIN SODIUM 5000 UNITS: 5000 INJECTION INTRAVENOUS; SUBCUTANEOUS at 22:14

## 2020-12-24 RX ADMIN — ACETAMINOPHEN 975 MG: 325 TABLET ORAL at 11:57

## 2020-12-24 RX ADMIN — ACETAMINOPHEN 975 MG: 325 TABLET ORAL at 16:50

## 2020-12-24 RX ADMIN — INSULIN GLARGINE 20 UNITS: 100 INJECTION, SOLUTION SUBCUTANEOUS at 22:10

## 2020-12-24 RX ADMIN — CEFEPIME HYDROCHLORIDE 2000 MG: 2 INJECTION, POWDER, FOR SOLUTION INTRAVENOUS at 11:57

## 2020-12-24 RX ADMIN — INSULIN LISPRO 1 UNITS: 100 INJECTION, SOLUTION INTRAVENOUS; SUBCUTANEOUS at 16:47

## 2020-12-24 RX ADMIN — METRONIDAZOLE 500 MG: 500 TABLET ORAL at 15:44

## 2020-12-24 RX ADMIN — SERTRALINE HYDROCHLORIDE 50 MG: 50 TABLET ORAL at 08:39

## 2020-12-24 RX ADMIN — ASPIRIN 81 MG CHEWABLE TABLET 81 MG: 81 TABLET CHEWABLE at 08:38

## 2020-12-24 RX ADMIN — METOPROLOL SUCCINATE 100 MG: 50 TABLET, EXTENDED RELEASE ORAL at 08:38

## 2020-12-24 RX ADMIN — TAMSULOSIN HYDROCHLORIDE 0.4 MG: 0.4 CAPSULE ORAL at 22:13

## 2020-12-24 RX ADMIN — FLUTICASONE PROPIONATE 1 SPRAY: 50 SPRAY, METERED NASAL at 08:38

## 2020-12-24 RX ADMIN — HEPARIN SODIUM 5000 UNITS: 5000 INJECTION INTRAVENOUS; SUBCUTANEOUS at 08:38

## 2020-12-24 RX ADMIN — CEFEPIME HYDROCHLORIDE 2000 MG: 2 INJECTION, POWDER, FOR SOLUTION INTRAVENOUS at 23:26

## 2020-12-24 RX ADMIN — ISOSORBIDE MONONITRATE 90 MG: 30 TABLET, EXTENDED RELEASE ORAL at 08:38

## 2020-12-24 RX ADMIN — METRONIDAZOLE 500 MG: 500 TABLET ORAL at 22:12

## 2020-12-24 NOTE — PROGRESS NOTES
Progress Note - [facility] 1945, 66 y o  male MRN: [id number]    Unit/Bed#: E5 -01 Encounter: [id number]    Primary Care Provider: Mike Rosales MD   Date and time admitted to hospital: 12/18/2020 11:52 AM        * Subacute osteomyelitis of left foot [facility])  Assessment & Plan  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 fifth proximal phalangeal base  - s/p RAY RESECTION FOOT (Left) by Podiatry 12/21/2020, management per primary team  - vascular surgery following  - ID consulted after cultures came back positive for Enterobacter and Staph aureus with blood cultures positive for MSSA   - Currently on Cefepime and Flagyl as per ID     Results from last 7 days   Lab Units 12/21/20  1409 12/21/20  1408 12/21/20  0643 12/21/20  0642 12/18/20  1857 12/18/20  1706 12/18/20  1653   BLOOD CULTURE   --   --  No Growth at 48 hrs  No Growth at 48 hrs  --  No Growth After 4 Days   Staphylococcus aureus*   GRAM STAIN RESULT  No Polys or Bacteria seen No Polys  No organisms seen  --   --  Rare Polys*  3+ Gram positive cocci in clusters*  --  Gram positive cocci in clusters*               MSSA bacteremia  Assessment & Plan  One of 2 sets from 12/18/2020  Felt to be transient bacteremia in setting of osteomyelitis versus contaminant, Infectious Disease following  2D echo without evidence of vegetation  Patient currently on Flagyl and cefepime, further antibiotic management per Infectious Disease    Anemia of chronic disease  Assessment & Plan  In setting of chronic kidney disease  Hemoglobin stable 9-10    Recent Labs     12/21/20  0641 12/22/20  0506 12/23/20  0612   HGB 10 1* 9 1* 9 2*   MCV 93 94 94   RDW 13 3 13 5 13 6         Acute kidney injury superimposed on chronic kidney disease ([facility] [address] )  Assessment & Plan  Had a mild acute kidney injury on CKD stage IV now resolved - creatinine peaked at 2 8 with baseline creatinine appearing 2 4  - creatinine below baseline 2 1 today  - can likely resume Lasix tomorrow  - Renally dose medications  - monitor daily weights    Recent Labs     20  0641 20  0506 20  0612   BUN 54* 45* 41*   CREATININE 2 67* 2 40* 2 19*   EGFR 25 29 32       Results from last 7 days   Lab Units 20  1447   SODIUM UR  36   CREATININE UR mg/dL 100 8         Type 2 diabetes mellitus, with long-term current use of insulin [facility])  Assessment & Plan  Lab Results   Component Value Date    HGBA1C 7 4 (H) 2020       Recent Labs     20  2113 20  0736 20  1128 20  1532   POCGLU 145* 105 165* 183*       Blood Sugar Average: Last 72 hrs:  (P) 167     Acceptable control with hemoglobin A1c of 7 4  Home Regimen is 27U HS and 10-12U TID AC  Continue Lantus to 20 units HS, proceed with Accu-Cheks and insulin sliding scale    CAD (coronary artery disease)  Assessment & Plan  Continue aspirin, metoprolol and Imdur    VTE Pharmacologic Prophylaxis:   Pharmacologic: Heparin  Mechanical VTE Prophylaxis in Place: Yes    Patient Centered Rounds: I have performed bedside rounds with nursing staff today  Discussions with Specialists or Other Care Team Provider:  Case management    Education and Discussions with Family / Patient:  Patient    Time Spent for Care: 30 minutes  More than 50% of total time spent on counseling and coordination of care as described above  Current Length of Stay: 5 day(s)    Current Patient Status: Inpatient   Certification Statement: The patient will continue to require additional inpatient hospital stay due to IV antibiotics, close monitoring - per primary team    Discharge Plan:  Per primary team    Code Status: Level 1 - Full Code      Subjective:   Patient seen and examined  States that he earlier pain in his left lower extremity now improved pain medication  He is afebrile  No other complaints, no overnight events      Objective:     Vitals:   Temp (24hrs), Av 6 °F (36 4 °C), Min:97 4 °F (36 3 °C), Max:97 8 °F (36 6 °C)    Temp:  [97 4 °F (36 3 °C)-97 8 °F (36 6 °C)] 97 6 °F (36 4 °C)  HR:  [60-72] 72  Resp:  [18] 18  BP: (136-166)/(78-89) 138/81  SpO2:  [98 %-100 %] 98 %  There is no height or weight on file to calculate BMI  Input and Output Summary (last 24 hours): Intake/Output Summary (Last 24 hours) at 12/23/2020 1922  Last data filed at 12/23/2020 1801  Gross per 24 hour   Intake 3350 ml   Output 1250 ml   Net 2100 ml       Physical Exam:     Physical Exam  Constitutional:       General: He is not in acute distress  HENT:      Head: Normocephalic and atraumatic  Nose: No congestion  Eyes:      Conjunctiva/sclera: Conjunctivae normal    Cardiovascular:      Rate and Rhythm: Normal rate and regular rhythm  Heart sounds: No murmur  Pulmonary:      Effort: No respiratory distress  Breath sounds: No wheezing or rales  Abdominal:      General: There is no distension  Tenderness: There is no abdominal tenderness  There is no guarding  Musculoskeletal:      Right lower leg: No edema  Left lower leg: No edema  Skin:     Comments: Left foot dressing dry clean and intact   Neurological:      Mental Status: He is oriented to person, place, and time     Psychiatric:         Mood and Affect: Mood normal          Additional Data:     Labs:    Results from last 7 days   Lab Units 12/23/20  0612   WBC Thousand/uL 8 99   HEMOGLOBIN g/dL 9 2*   HEMATOCRIT % 28 5*   PLATELETS Thousands/uL 151   NEUTROS PCT % 70   LYMPHS PCT % 16   MONOS PCT % 11   EOS PCT % 2     Results from last 7 days   Lab Units 12/23/20  0612  12/18/20  1705   SODIUM mmol/L 137   < > 140   POTASSIUM mmol/L 4 3   < > 5 0   CHLORIDE mmol/L 106   < > 106   CO2 mmol/L 24   < > 27   BUN mg/dL 41*   < > 50*   CREATININE mg/dL 2 19*   < > 2 47*   ANION GAP mmol/L 7   < > 7   CALCIUM mg/dL 8 6   < > 8 9   ALBUMIN g/dL  --   --  3 5   TOTAL BILIRUBIN mg/dL  --   --  0 46   ALK PHOS U/L --   --  115   ALT U/L  --   --  61   AST U/L  --   --  22   GLUCOSE RANDOM mg/dL 114   < > 121    < > = values in this interval not displayed  Results from last 7 days   Lab Units 12/23/20  1532 12/23/20  1128 12/23/20  0736 12/22/20  2113 12/22/20  1555 12/22/20  1114 12/22/20  0741 12/21/20  2040 12/21/20  1456 12/21/20  1110 12/21/20  0723 12/20/20  2044   POC GLUCOSE mg/dl 183* 165* 105 145* 226* 179* 121 265* 131 130 119 276*     Results from last 7 days   Lab Units 12/18/20  1652   HEMOGLOBIN A1C % 7 4*               * I Have Reviewed All Lab Data Listed Above  * Additional Pertinent Lab Tests Reviewed: S[address] 64 Admission Reviewed    Imaging:    Imaging Reports Reviewed Today Include: no new       Recent Cultures (last 7 days):     Results from last 7 days   Lab Units 12/21/20  1409 12/21/20  1408 12/21/20  0643 12/21/20  0642 12/18/20  1857 12/18/20  1706 12/18/20  1653   BLOOD CULTURE   --   --  No Growth at 48 hrs  No Growth at 48 hrs  --  No Growth After 4 Days   Staphylococcus aureus*   GRAM STAIN RESULT  No Polys or Bacteria seen No Polys  No organisms seen  --   --  Rare Polys*  3+ Gram positive cocci in clusters*  --  Gram positive cocci in clusters*   WOUND CULTURE   --  1+ Growth of Staphylococcus aureus*  Few Colonies of Enterobacter aerogenes*  --   --  2+ Growth of Enterobacter aerogenes*  4+ Growth of Staphylococcus aureus*  --   --        Last 24 Hours Medication List:   Current Facility-Administered Medications   Medication Dose Route Frequency Provider Last Rate    acetaminophen  975 mg Oral Q6H [address] Gilbert Serge, DPM      ALPRAZolam  0 25 mg Oral HS PRN Haiderbert Serge, DPM      aspirin  81 mg Oral Daily Gilbert Serge, DPM      cefepime  2,000 mg Intravenous Q12H Gilbert Serge, DPM 2,000 mg (12/23/20 1134)    fluticasone  1 spray Each Nare Daily Gilbert Serge, DPM      gabapentin  600 mg Oral Daily Gilbert Serge, DPM      heparin (porcine)  5,000 Units Subcutaneous Q12H [facility] Rafael Adkins, DPM      insulin glargine  20 Units Subcutaneous HS Rina Sheriff MD      insulin lispro  1-5 Units Subcutaneous 4x Daily (AC & HS) Rafaelirma Fonsecaa, DPM      isosorbide mononitrate  90 mg Oral Daily [facility], DPM      latanoprost  1 drop Both Eyes HS Ouray Lucille, DPM      melatonin  3 mg Oral HS PRN Rafael Lucille, DPM      metoprolol succinate  100 mg Oral Daily [facility], DPM      metroNIDAZOLE  500 mg Oral [address], DPM      ondansetron  4 mg Intravenous Q6H PRN [facility], DPM      oxyCODONE  5 mg Oral Q6H PRN [facility], DPM      pantoprazole  40 mg Oral Early Morning [facility], DPM      polyethylene glycol  17 g Oral Daily [facility], DPM      senna-docusate sodium  1 tablet Oral HS [facility], DPM      sertraline  50 mg Oral Daily [facility], DPM      sodium chloride  50 mL/hr Intravenous Continuous Ouray Lucille, DPM 50 mL/hr (12/21/20 2034)    tamsulosin  0 4 mg Oral HS [facility], DPM      traMADol  50 mg Oral Q12H PRN Rafael Adkins, DPM          Today, Patient Was Seen By: [place] MD    ** Please Note: Dictation voice to text software may have been used in the creation of this document   **

## 2020-12-24 NOTE — SOCIAL WORK
GMLOS:     Today         LOS: 6  BUNDLED? No  UNPLANNED READMISSION LEVEL: 30 moderate risk for readmission  30 DAY READMISSION? No    Pt was admitted on 12/18/20 with subacute osteomyelitis of left foot  SW met with pt at bedside to conduct a CM assessment and discuss discharge planning  Pt lives in a 2 story home with SO Carey Rose ([phone])  He reports that there are about 10 steps to enter the home  Pt report's he is in independent with his ADL's and SO assist when needed  Pt not currently open with HHC/VNA/Rehab  He uses [facility] in [facility]  PCP is Mauricio Landers  Pt denies any hx of MH or D&A  Pt denies having a POA  He has information and does not need any resources here  Pt is retired and drives  PT/OT recommendation is STR  Pt is agreeable  He requested that referrals be made to [address] [place] [address], [address]  He will need transport  SW will continue to follow    A post acute care recommendation was made by your care team for Acute Rehab  Discussed [place] [facility] with patient  List of facilities given to patient via in person  patient aware the list is custom filtered for them by preference  and that [facility] post acute providers are designated

## 2020-12-24 NOTE — ASSESSMENT & PLAN NOTE
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 fifth proximal phalangeal base  - s/p RAY RESECTION FOOT (Left) by Podiatry 12/21/2020, management per primary team  - vascular surgery following  - ID consulted after cultures came back positive for Enterobacter and Staph aureus with blood cultures positive for MSSA   - Currently on Cefepime and Flagyl as per ID   - plan to discharge on long-term antibiotics, tunneled catheter placed by IR today    Results from last 7 days   Lab Units 12/21/20  1409 12/21/20  1408 12/21/20  0643 12/21/20  0642 12/18/20  1857 12/18/20  1706 12/18/20  1653   BLOOD CULTURE   --   --  No Growth at 72 hrs  No Growth at 72 hrs   --  No Growth After 5 Days   Staphylococcus aureus*   GRAM STAIN RESULT  No Polys or Bacteria seen No Polys  No organisms seen  --   --  Rare Polys*  3+ Gram positive cocci in clusters*  --  Gram positive cocci in clusters*

## 2020-12-24 NOTE — PROCEDURES
Central Line    Date/Time: 12/24/2020 2:55 PM  Performed by: Shell Silva MD  Authorized by: Shell Silva MD     Patient location:  IR  Other Assisting Provider: No    Consent:     Consent obtained:  Written    Consent given by:  Patient    Risks discussed:  Arterial puncture, bleeding and infection    Alternatives discussed:  No treatment and delayed treatment  Universal protocol:     Procedure explained and questions answered to patient or proxy's satisfaction: yes      Relevant documents present and verified: yes      Test results available and properly labeled: yes      Radiology Images displayed and confirmed  If images not available, report reviewed: yes      Required blood products, implants, devices, and special equipment available: yes      Site/side marked: yes      Immediately prior to procedure, a time out was called: yes      Patient identity confirmed:  Verbally with patient and arm band  Pre-procedure details:     Hand hygiene: Hand hygiene performed prior to insertion      Sterile barrier technique:  All elements of maximal sterile technique followed      Skin preparation:  2% chlorhexidine    Skin preparation agent: Skin preparation agent completely dried prior to procedure    Indications:     Central line indications: medications requiring central line and other (comment)    Procedure details:     Location:  Right internal jugular    Vessel type: vein      Laterality:  Right    Approach: percutaneous technique used      Patient position:  Flat    Catheter type:  Double lumen    Catheter size:  6 Fr    Landmarks identified: yes      Ultrasound guidance: yes      Sterile ultrasound techniques: Sterile gel and sterile probe covers were used      Number of attempts:  1    Successful placement: yes    Post-procedure details:     Post-procedure:  Dressing applied and line sutured    Assessment:  Placement verified by x-ray and blood return through all ports    Post-procedure complications: none Patient tolerance of procedure:   Tolerated well, no immediate complications  Comments:      Right IJ 6 Armenian narrow caliber tunneled central venous access placement

## 2020-12-24 NOTE — ASSESSMENT & PLAN NOTE
Had a mild acute kidney injury on CKD stage IV now resolved - creatinine peaked at 2 8 with baseline creatinine appearing 2 4  - creatinine below baseline 2 1 today  - can likely resume Lasix tomorrow  - Renally dose medications  - monitor daily weights    Recent Labs     12/21/20  0641 12/22/20  0506 12/23/20  0612   BUN 54* 45* 41*   CREATININE 2 67* 2 40* 2 19*   EGFR 25 29 32       Results from last 7 days   Lab Units 12/20/20  1447   SODIUM UR  36   CREATININE UR mg/dL 100 8

## 2020-12-24 NOTE — ASSESSMENT & PLAN NOTE
In setting of chronic kidney disease  Hemoglobin stable 9-10    Recent Labs     12/22/20  0506 12/23/20  0612   HGB 9 1* 9 2*   MCV 94 94   RDW 13 5 13 6

## 2020-12-24 NOTE — PROGRESS NOTES
[facility] Podiatry - Progress Note  Patient: Julito Garzon 66 y o  male   MRN: [id number]  PCP: Lay Rolle MD  Unit/Bed#: E5 -67 Encounter: [id number]  Date Of Visit: 20    ASSESSMENT:    Julito Garzon is a 66 y o  male with:    1  Left foot osteomyelitis of 5th metatarsal, clean cultures pending  2  Left foot cellulitis  3  MSSA bacteremia   4  Insulin Dependent Diabetes mellitus  5  Coronary arterial disease  6  Peripheral arterial disease  7  Hypertension      PLAN:    · Dorsal incision has serous drainage most likely due to dependent positioning  · Erythema of dorsal foot noted with improvement in margins  · Strict NWB of left foot  · Continue 2 weeks IV cefipime per ID         SUBJECTIVE:     The patient was seen, evaluated, and assessed at bedside today  The patient was awake, alert, and in no acute distress  No acute events overnight  The patient reports some pain of the left foot  He states he does not want to be rushed to a "crappy rehab"  Patient denies N/V/F/chills/SOB/CP  OBJECTIVE:     Vitals:   /72 (BP Location: Left arm)   Pulse 60   Temp 97 9 °F (36 6 °C) (Temporal)   Resp 18   Wt 110 kg (241 lb 6 5 oz)   SpO2 99%   BMI 29 38 kg/m²     Temp (24hrs), Av 6 °F (36 4 °C), Min:97 3 °F (36 3 °C), Max:97 9 °F (36 6 °C)      Physical Exam  Vitals signs and nursing note reviewed  Constitutional:       General: He is not in acute distress  Appearance: He is not ill-appearing, toxic-appearing or diaphoretic  HENT:      Head: Normocephalic and atraumatic  Nose: Nose normal    Eyes:      Pupils: Pupils are equal, round, and reactive to light  Cardiovascular:      Rate and Rhythm: Regular rhythm  Pulmonary:      Effort: Pulmonary effort is normal    Abdominal:      Palpations: Abdomen is soft  Skin:     General: Skin is warm  Neurological:      Mental Status: He is alert and oriented to person, place, and time  Mental status is at baseline     Psychiatric: Mood and Affect: Mood normal          Behavior: Behavior normal          Thought Content: Thought content normal          Judgment: Judgment normal       :     General:  Alert, cooperative, and in no distress  Lower extremity exam:  Cardiovascular status at baseline  Neurological status at baseline  Musculoskeletal status at baseline  No calf tenderness noted bilaterally  Plantar incision well copated with sutures intact  Serous drainage noted  no purulence able to be expressed  Dorsal forefoot erythema and local edema  Clinical Images 12/24/20:      Left lateral foot    Additional Data:     Labs:    Results from last 7 days   Lab Units 12/23/20  0612   WBC Thousand/uL 8 99   HEMOGLOBIN g/dL 9 2*   HEMATOCRIT % 28 5*   PLATELETS Thousands/uL 151   NEUTROS PCT % 70   LYMPHS PCT % 16   MONOS PCT % 11   EOS PCT % 2     Results from last 7 days   Lab Units 12/24/20  0533  12/18/20  1705   POTASSIUM mmol/L 4 5   < > 5 0   CHLORIDE mmol/L 107   < > 106   CO2 mmol/L 24   < > 27   BUN mg/dL 39*   < > 50*   CREATININE mg/dL 2 54*   < > 2 47*   CALCIUM mg/dL 8 7   < > 8 9   ALK PHOS U/L  --   --  115   ALT U/L  --   --  61   AST U/L  --   --  22    < > = values in this interval not displayed  * I Have Reviewed All Lab Data Listed Above  Recent Cultures (last 7 days):     Results from last 7 days   Lab Units 12/21/20  1409 12/21/20  1408 12/21/20  0643 12/21/20  0642 12/18/20  1857 12/18/20  1706 12/18/20  1653   BLOOD CULTURE   --   --  No Growth at 72 hrs  No Growth at 72 hrs   --  No Growth After 5 Days   Staphylococcus aureus*   GRAM STAIN RESULT  No Polys or Bacteria seen No Polys  No organisms seen  --   --  Rare Polys*  3+ Gram positive cocci in clusters*  --  Gram positive cocci in clusters*   WOUND CULTURE   --  1+ Growth of Staphylococcus aureus*  Few Colonies of Enterobacter aerogenes*  --   --  2+ Growth of Enterobacter aerogenes*  4+ Growth of Staphylococcus aureus*  --   -- Results from last 7 days   Lab Units 12/21/20  1409 12/21/20  1408 12/18/20  1857   ANAEROBIC CULTURE  No growth No anaerobes isolated 4+ Growth of   1+ Growth of Prevotella bivia*       Imaging: I have personally reviewed pertinent films in PACS  Pathology, and Other Studies: I have personally reviewed pertinent reports  ** Please Note: Portions of the record may have been created with voice recognition software  Occasional wrong word or "sound a like" substitutions may have occurred due to the inherent limitations of voice recognition software  Read the chart carefully and recognize, using context, where substitutions have occurred   **

## 2020-12-24 NOTE — PROGRESS NOTES
Progress Note - Infectious Disease   Nava Finder 66 y o  male MRN: [id number]  Unit/Bed#: E5 -01 Encounter: [id number]      Impression/Plan:  1  MSSA bacteremia-with only 1 of 2 sets positive   While this could represent a contaminant, the patient had the same organism isolated in the foot wound which has been worsening, therefore I suspect this is more of a transient bacteremia   Consideration for the possibility of endocarditis although I doubt   Fortunately the patient is not systemically ill, is hemodynamically stable   He seems to be tolerating the antibiotics without difficulty   Follow-up blood cultures are negative thus far  Echocardiogram without valvular vegetation  -antibiotics as below  -follow up repeat blood cultures  -okay to place PICC line  -additional workup as needed  -anticipate at least 2 weeks of intravenous antibiotics     2  Left foot osteomyelitis-involving primarily the 5th metatarsal but also the phalanx   Infection appears to be polymicrobial although I suspect that MSSA is the primary invasive organism   Fortunately the infection seems to be fairly well localized   patient is now status post 5th ray amputation  -continue cefepime for now at current dose  -continue Flagyl  -follow-up path margins determine duration of antibiotics  -follow up operative cultures and adjust antibiotics as needed  -once confirm clean path margins, can simplify the antibiotics to cefazolin  -recheck CBC with diff and BMP  -close podiatry follow-up  -serial exams     3   Chronic kidney disease-advanced   Renal function is waxing waning with some worsening since yesterday   Possibly a pre renal issue   -dose adjusted antibiotics as needed  -volume management  -recheck BMP     4  Chronic dysuria-with positive urine culture for BCG as expected post treatment for bladder cancer   The patient is now status post biopsy with mild chronic inflammation but AFB smears negative for invasive disease   AFB Culture from the biopsies negative thus far at 4 week  -follow-up AFB cultures  -recheck AFB cultures  -follow-up with Urology and in the Infectious Disease office    Discussed the above management plan in detail with the primary service    Antibiotics:  Cefepime 5  Flagyl 7  Antibiotics 7  Postop day 3    Subjective:  Patient has no fever, chills, sweats; no nausea, vomiting, diarrhea; no cough, shortness of breath; no increased pain  No new symptoms  Objective:  Vitals:  Temp:  [97 3 °F (36 3 °C)-97 8 °F (36 6 °C)] 97 8 °F (36 6 °C)  HR:  [58-72] 63  Resp:  [18] 18  BP: (134-159)/(73-90) 159/73  SpO2:  [98 %-100 %] 98 %  Temp (24hrs), Av 5 °F (36 4 °C), Min:97 3 °F (36 3 °C), Max:97 8 °F (36 6 °C)  Current: Temperature: 97 8 °F (36 6 °C)    Physical Exam:   General Appearance:  Alert, interactive, nontoxic, no acute distress  Throat: Oropharynx moist without lesions  Lungs:   Clear to auscultation bilaterally; no wheezes, rhonchi or rales; respirations unlabored   Heart:  RRR; no murmur, rub or gallop   Abdomen:   Soft, non-tender, non-distended, positive bowel sounds  Extremities: No clubbing, cyanosis  Left foot heavily dressed with a dry dressing in place  Reviewed images in epic with some maceration at the incision site  Skin: No new rashes or lesions  No draining wounds noted         Labs, Imaging, & Other studies:   All pertinent labs and imaging studies were personally reviewed  Results from last 7 days   Lab Units 20  0612 20  0506 20  0641   WBC Thousand/uL 8 99 10 94* 11 99*   HEMOGLOBIN g/dL 9 2* 9 1* 10 1*   PLATELETS Thousands/uL 151 142* 143*     Results from last 7 days   Lab Units 20  0533 20  0612 20  0506  20  1705   SODIUM mmol/L 139 137 137   < > 140   POTASSIUM mmol/L 4 5 4 3 4 3   < > 5 0   CHLORIDE mmol/L 107 106 104   < > 106   CO2 mmol/L 24 24 22   < > 27   BUN mg/dL 39* 41* 45*   < > 50*   CREATININE mg/dL 2 54* 2 19* 2 40*   < > 2 47* EGFR ml/min/1 73sq m 27 32 29   < > 28   CALCIUM mg/dL 8 7 8 6 8 6   < > 8 9   AST U/L  --   --   --   --  22   ALT U/L  --   --   --   --  61   ALK PHOS U/L  --   --   --   --  115    < > = values in this interval not displayed  Results from last 7 days   Lab Units 12/21/20  1409 12/21/20  1408 12/21/20  0643 12/21/20  0642 12/18/20  1857 12/18/20  1706 12/18/20  1653   BLOOD CULTURE   --   --  No Growth at 72 hrs  No Growth at 72 hrs   --  No Growth After 5 Days   Staphylococcus aureus*   GRAM STAIN RESULT  No Polys or Bacteria seen No Polys  No organisms seen  --   --  Rare Polys*  3+ Gram positive cocci in clusters*  --  Gram positive cocci in clusters*   WOUND CULTURE   --  1+ Growth of Staphylococcus aureus*  Few Colonies of Enterobacter aerogenes*  --   --  2+ Growth of Enterobacter aerogenes*  4+ Growth of Staphylococcus aureus*  --   --              Results from last 7 days   Lab Units 12/19/20  0520   FERRITIN ng/mL 52

## 2020-12-24 NOTE — PHYSICAL THERAPY NOTE
Physical Therapy Cancellation Note    Attempted to see pt  This PM, however off the floor for a procedure  Will cancel and continue to follow as appropriate       Ashli Vazquez, PTA

## 2020-12-24 NOTE — ASSESSMENT & PLAN NOTE
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 fifth proximal phalangeal base  - s/p RAY RESECTION FOOT (Left) by Podiatry 12/21/2020, management per primary team  - vascular surgery following  - ID consulted after cultures came back positive for Enterobacter and Staph aureus with blood cultures positive for MSSA   - Currently on Cefepime and Flagyl as per ID     Results from last 7 days   Lab Units 12/21/20  1409 12/21/20  1408 12/21/20  0643 12/21/20  0642 12/18/20  1857 12/18/20  1706 12/18/20  1653   BLOOD CULTURE   --   --  No Growth at 48 hrs  No Growth at 48 hrs  --  No Growth After 4 Days   Staphylococcus aureus*   GRAM STAIN RESULT  No Polys or Bacteria seen No Polys  No organisms seen  --   --  Rare Polys*  3+ Gram positive cocci in clusters*  --  Gram positive cocci in clusters*

## 2020-12-24 NOTE — NURSING NOTE
Pt returned from IR with D/L tunneled catheter- RCW  Both lumens have blood return and flush without difficulty  NS at 50 ml/hr infusing as ordered  Dsg D/C/I  LLE elevated  DEJUAN wound secondary to dressing  Ace bandage D/C/I  Call bell in reach  Will continue to monitor

## 2020-12-24 NOTE — NURSING NOTE
Pt complained of tingling on the face and a little tightness in the chest after blood draw an d IV change  RN took vitals and charted , they were WNL  After 10 minutes pt stated they were ok

## 2020-12-24 NOTE — ASSESSMENT & PLAN NOTE
Lab Results   Component Value Date    HGBA1C 7 4 (H) 12/18/2020       Recent Labs     12/22/20  2113 12/23/20  0736 12/23/20  1128 12/23/20  1532   POCGLU 145* 105 165* 183*       Blood Sugar Average: Last 72 hrs:  (P) 167     Acceptable control with hemoglobin A1c of 7 4  Home Regimen is 27U HS and 10-12U TID AC    Continue Lantus to 20 units HS, proceed with Accu-Cheks and insulin sliding scale

## 2020-12-24 NOTE — PROGRESS NOTES
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 fifth proximal phalangeal base  - s/p RAY RESECTION FOOT (Left) by Podiatry 12/21/2020, management per primary team  - vascular surgery following  - ID consulted after cultures came back positive for Enterobacter and Staph aureus with blood cultures positive for MSSA   - Currently on Cefepime and Flagyl as per ID   - plan to discharge on long-term antibiotics, tunneled catheter placed by IR today    Results from last 7 days   Lab Units 12/21/20  1409 12/21/20  1408 12/21/20  0643 12/21/20  0642 12/18/20  1857 12/18/20  1706 12/18/20  1653   BLOOD CULTURE   --   --  No Growth at 72 hrs  No Growth at 72 hrs   --  No Growth After 5 Days   Staphylococcus aureus*   GRAM STAIN RESULT  No Polys or Bacteria seen No Polys  No organisms seen  --   --  Rare Polys*  3+ Gram positive cocci in clusters*  --  Gram positive cocci in clusters*               MSSA bacteremia  Assessment & Plan  One of 2 sets from 12/18/2020  Felt to be transient bacteremia in setting of osteomyelitis versus contaminant, Infectious Disease following  2D echo without evidence of vegetation  Patient currently on Flagyl and cefepime, further antibiotic management per Infectious Disease  Status post tunneled central venous catheter placement for long-term antibiotics per ID recommendations        Anemia of chronic disease  Assessment & Plan  In setting of chronic kidney disease  Hemoglobin stable 9-10    Recent Labs     12/22/20  0506 12/23/20  0612   HGB 9 1* 9 2*   MCV 94 94   RDW 13 5 13 6         Acute kidney injury superimposed on chronic kidney disease [facility])  Assessment & Plan  Had a mild acute kidney injury on CKD stage IV now resolved - creatinine peaked at 2 8 with baseline creatinine appearing 2 4  - creatinine near baseline at 2 5 today  - patient appears euvolemic  - continue to hold Lasix for now  - Renally dose medications  - monitor daily weights    Recent Labs     12/22/20  0506 12/23/20  0612 12/24/20  0533   BUN 45* 41* 39*   CREATININE 2 40* 2 19* 2 54*   EGFR 29 32 27       Results from last 7 days   Lab Units 12/20/20  1447   SODIUM UR  36   CREATININE UR mg/dL 100 8         Type 2 diabetes mellitus, with long-term current use of insulin [facility])  Assessment & Plan  Lab Results   Component Value Date    HGBA1C 7 4 (H) 12/18/2020       Recent Labs     12/23/20  2016 12/24/20  0648 12/24/20  1139 12/24/20  1624   POCGLU 122 99 119 171*       Blood Sugar Average: Last 72 hrs:  (P) 152     Acceptable control with hemoglobin A1c of 7 4  Home Regimen is 27U HS and 10-12U TID AC  Continue Lantus to 20 units HS, proceed with Accu-Cheks and insulin sliding scale    Hypertension with renal disease  Assessment & Plan  Continue metoprolol  Lasix on hold     CAD (coronary artery disease)  Assessment & Plan  Continue aspirin, metoprolol and Imdur      VTE Pharmacologic Prophylaxis:   Pharmacologic: Heparin  Mechanical VTE Prophylaxis in Place: Yes    Patient Centered Rounds: I have performed bedside rounds with nursing staff today  Discussions with Specialists or Other Care Team Provider: Primary team - Podiatry, ID, IR    Education and Discussions with Family / Patient: Patient    Time Spent for Care: 45 minutes  More than 50% of total time spent on counseling and coordination of care as described above      Current Length of Stay: 6 day(s)    Current Patient Status: Inpatient   Certification Statement: The patient will continue to require additional inpatient hospital stay due to IV Abx, placement, close monitoring     Discharge Plan: TBD, per primary team - likely rehab    Code Status: Level 1 - Full Code      Subjective:   Patient seen and examined  He reports feeling overall well  States that his foot pain is controlled  No overnight events  Objective:     Vitals:   Temp (24hrs), Av 6 °F (36 4 °C), Min:97 3 °F (36 3 °C), Max:97 9 °F (36 6 °C)    Temp:  [97 3 °F (36 3 °C)-97 9 °F (36 6 °C)] 97 6 °F (36 4 °C)  HR:  [58-65] 64  Resp:  [18] 18  BP: (114-159)/(71-90) 121/76  SpO2:  [98 %-100 %] 100 %  Body mass index is 29 38 kg/m²  Input and Output Summary (last 24 hours): Intake/Output Summary (Last 24 hours) at 2020 1840  Last data filed at 2020 1601  Gross per 24 hour   Intake 1700 ml   Output 900 ml   Net 800 ml       Physical Exam:     Physical Exam  Constitutional:       General: He is not in acute distress  HENT:      Head: Normocephalic and atraumatic  Mouth/Throat:      Pharynx: Oropharynx is clear  Eyes:      Conjunctiva/sclera: Conjunctivae normal    Cardiovascular:      Rate and Rhythm: Normal rate and regular rhythm  Pulmonary:      Effort: No respiratory distress  Breath sounds: No wheezing or rales  Abdominal:      General: There is no distension  Tenderness: There is no abdominal tenderness  There is no guarding  Skin:     General: Skin is warm and dry  Comments: Left foot dressing clean dry intact    Neurological:      Mental Status: He is oriented to person, place, and time     Psychiatric:         Mood and Affect: Mood normal          Additional Data:     Labs:    Results from last 7 days   Lab Units 20  0612   WBC Thousand/uL 8 99   HEMOGLOBIN g/dL 9 2*   HEMATOCRIT % 28 5*   PLATELETS Thousands/uL 151   NEUTROS PCT % 70   LYMPHS PCT % 16   MONOS PCT % 11   EOS PCT % 2     Results from last 7 days   Lab Units 20  0533  20  1705   SODIUM mmol/L 139   < > 140   POTASSIUM mmol/L 4 5   < > 5 0   CHLORIDE mmol/L 107   < > 106   CO2 mmol/L 24   < > 27   BUN mg/dL 39*   < > 50* CREATININE mg/dL 2 54*   < > 2 47*   ANION GAP mmol/L 8   < > 7   CALCIUM mg/dL 8 7   < > 8 9   ALBUMIN g/dL  --   --  3 5   TOTAL BILIRUBIN mg/dL  --   --  0 46   ALK PHOS U/L  --   --  115   ALT U/L  --   --  61   AST U/L  --   --  22   GLUCOSE RANDOM mg/dL 108   < > 121    < > = values in this interval not displayed  Results from last 7 days   Lab Units 12/24/20  1624 12/24/20  1139 12/24/20  0648 12/23/20  2016 12/23/20  1532 12/23/20  1128 12/23/20  0736 12/22/20  2113 12/22/20  1555 12/22/20  1114 12/22/20  0741 12/21/20  2040   POC GLUCOSE mg/dl 171* 119 99 122 183* 165* 105 145* 226* 179* 121 265*     Results from last 7 days   Lab Units 12/18/20  1652   HEMOGLOBIN A1C % 7 4*               * I Have Reviewed All Lab Data Listed Above  * Additional Pertinent Lab Tests Reviewed: Sergio 64 Admission Reviewed    Imaging:    Imaging Reports Reviewed Today Include: IR catheter placement      Recent Cultures (last 7 days):     Results from last 7 days   Lab Units 12/21/20  1409 12/21/20  1408 12/21/20  0643 12/21/20  0642 12/18/20  1857 12/18/20  1706 12/18/20  1653   BLOOD CULTURE   --   --  No Growth at 72 hrs  No Growth at 72 hrs   --  No Growth After 5 Days   Staphylococcus aureus*   GRAM STAIN RESULT  No Polys or Bacteria seen No Polys  No organisms seen  --   --  Rare Polys*  3+ Gram positive cocci in clusters*  --  Gram positive cocci in clusters*   WOUND CULTURE   --  1+ Growth of Staphylococcus aureus*  Few Colonies of Enterobacter aerogenes*  --   --  2+ Growth of Enterobacter aerogenes*  4+ Growth of Staphylococcus aureus*  --   --        Last 24 Hours Medication List:   Current Facility-Administered Medications   Medication Dose Route Frequency Provider Last Rate    acetaminophen  975 mg Oral Q6H [address] Fei Chaudhry DPM      ALPRAZolam  0 25 mg Oral HS PRN Fei Chaudhry DPM      aspirin  81 mg Oral Daily Gilbert Serge, DPM      cefepime  2,000 mg Intravenous Q12H Westley Goldmann, DPM 2,000 mg (12/24/20 1157)    fluticasone  1 spray Each Nare Daily Westley Goldmann, DPM      gabapentin  600 mg Oral Daily Westley Goldmann, DPM      heparin (porcine)  5,000 Units Subcutaneous Q12H [address] Westley Goldmann, DPM      insulin glargine  20 Units Subcutaneous HS Juan Bedoya MD      insulin lispro  1-5 Units Subcutaneous 4x Daily (AC & HS) Westley Goldmann, DPM      isosorbide mononitrate  90 mg Oral Daily Westley Goldmann, DPM      latanoprost  1 drop Both Eyes HS Westley Goldmann, DPM      melatonin  3 mg Oral HS PRN Westley Goldmann, DPM      metoprolol succinate  100 mg Oral Daily Westley Goldmann, DPM      metroNIDAZOLE  500 mg Oral [address], DPM      ondansetron  4 mg Intravenous Q6H PRN Westley Goldmann, DPM      oxyCODONE  5 mg Oral Q6H PRN Westley Goldmann, DPM      pantoprazole  40 mg Oral Early Morning Westley Goldmann, DPM      polyethylene glycol  17 g Oral Daily Westley Goldmann, DPM      senna-docusate sodium  1 tablet Oral HS Westley Goldmann, DPM      sertraline  50 mg Oral Daily Westley Goldmann, DPM      sodium chloride  50 mL/hr Intravenous Continuous Westley Goldmann, DPM 50 mL/hr (12/21/20 2034)    tamsulosin  0 4 mg Oral HS Westley Goldmann, DPM      traMADol  50 mg Oral Q12H PRN Westley Goldmann, DPM          Today, Patient Was Seen By: Kedar Fang MD    ** Please Note: Dictation voice to text software may have been used in the creation of this document   **

## 2020-12-24 NOTE — ASSESSMENT & PLAN NOTE
In setting of chronic kidney disease  Hemoglobin stable 9-10    Recent Labs     12/21/20  0641 12/22/20  0506 12/23/20  0612   HGB 10 1* 9 1* 9 2*   MCV 93 94 94   RDW 13 3 13 5 13 6

## 2020-12-24 NOTE — ASSESSMENT & PLAN NOTE
Had a mild acute kidney injury on CKD stage IV now resolved - creatinine peaked at 2 8 with baseline creatinine appearing 2 4  - creatinine near baseline at 2 5 today  - patient appears euvolemic  - continue to hold Lasix for now  - Renally dose medications  - monitor daily weights    Recent Labs     12/22/20  0506 12/23/20  0612 12/24/20  0533   BUN 45* 41* 39*   CREATININE 2 40* 2 19* 2 54*   EGFR 29 32 27       Results from last 7 days   Lab Units 12/20/20  1447   SODIUM UR  36   CREATININE UR mg/dL 100 8

## 2020-12-24 NOTE — OCCUPATIONAL THERAPY NOTE
Occupational Therapy Treatment Note:         12/24/20 1025   Restrictions/Precautions   Weight Bearing Precautions Per Order Yes   LLE Weight Bearing Per Order NWB   Other Precautions WBS; Fall Risk;Multiple lines   Pain Assessment   Pain Assessment Tool 0-10   Pain Score 8   Pain Location/Orientation Orientation: Left; Location: Foot   Pain Onset/Description Descriptor: Throbbing   Effect of Pain on Daily Activities with activities instance  ADL   Where Assessed Sitting at sink   Grooming Assistance 5  Supervision/Setup   Grooming Deficit Setup   UB Bathing Assistance 5  Supervision/Setup   UB Bathing Deficit Setup   LB Bathing Assistance 4  Minimal Assistance   LB Bathing Deficit Setup;Steadying;Verbal cueing;Supervision/safety; Increased time to complete;Perineal area; Buttocks;Right lower leg including foot; Left lower leg including foot   LB Bathing Comments with simulation due to need for cues to maintain full NWB in LLE  UB Dressing Assistance 5  Supervision/Setup   UB Dressing Deficit Setup   LB Dressing Assistance 4  Minimal Assistance   LB Dressing Deficit Steadying;Setup; Requires assistive device for steadying;Verbal cueing;Supervision/safety; Increased time to complete; Don/doff R sock; Don/doff L sock;Pull up over hips; Thread LLE into underwear; Thread RLE into underwear   LB Dressing Comments Pt using hip shift techs while long sitting in bed  Will require further review to promote NWB into affected LLE with activity  Recommended use of shoe to RLE for stabilization with functional mobility  Toileting Assistance  4  Minimal Assistance   Toileting Deficit Setup;Steadying;Verbal cueing;Supervison/safety; Increased time to complete; Bedside commode;Perineal hygiene;Clothing management down;Clothing management up   Toileting Comments with simulation  Will benefit from continued use of extra wide drop arm commode  Functional Standing Tolerance   Time 1-2 mins   Activity dynamic stand balance activity  Comments Pt with slight pressure into LLE with each sit to stand upon initiation  Bed Mobility   Supine to Sit 5  Supervision   Additional items Bedrails; Increased time required;LE management;Verbal cues   Additional Comments Pt at EOB upon arrival     Transfers   Sit to Stand 4  Minimal assistance   Additional items Assist x 1; Armrests; Increased time required;Verbal cues; Bedrails   Stand to Sit 4  Minimal assistance   Additional items Assist x 1; Armrests; Bedrails; Increased time required;Verbal cues   Stand pivot 4  Minimal assistance   Additional items Assist x 1; Increased time required;Armrests; Bedrails;Verbal cues   Toilet transfer 4  Minimal assistance   Additional items Assist x 1; Armrests; Increased time required;Verbal cues; Commode   Additional Comments further review of NWB status will be requried  Functional Mobility   Functional Mobility 4  Minimal assistance   Additional Comments x1   Additional items Rolling walker   Cognition   Overall Cognitive Status WFL   Arousal/Participation Alert; Responsive; Cooperative   Attention Within functional limits   Orientation Level Oriented X4   Memory Within functional limits   Following Commands Follows multistep commands with increased time or repetition   Comments Pt able to make his needs known  Good carry over of newly learned information this tx session  Additional Activities   Additional Activities Other (Comment)  (reviewed current plan of care  )   Additional Activities Comments Pt reports hoping to get stronger with more rehab  Activity Tolerance   Activity Tolerance Patient limited by pain   Medical Staff Made Aware Reported all findings to RN Meagan Plascencia  Recommended use of tub transfer bench or standard chair instead of staxi w/c in front of sink for self care routine  Pt will require supervision with activity to promote NWB into LLE with activities      Assessment   Assessment Pt was seen for skilled OT with focus on completion of bed mobility, LE dressing activity, review of toilet transfers, tub transfer via tub transfer bench and review of current plan of care with focus on safety and maintaining NWB status in LLE  Pt reports having 6/10 pain upon greeting this tx session  Pt agreeable to tx session and had just finished completion of self care tasks at sink while seated  Educated Pt on need for safety with functional transfers and self care tasks  Educated Pt on availability of extra wide drop arm commode to encourage safety with self toileting and ease with clothing management and car hygiene using hip sift techs  Pt able to complete functional transfers with Min A overall and no LOB noted  Educated Pt on need to lead with RLE with functional transfers to promote ease with activity  Pt with Min A required overall for LE self care due to need for education on application to affected LLE prior to RLE using hip shift techs without pushing into affected LLE  Pt will benefit from further review  See above levels of A required for all functional tasks  Pt reports having good understanding of using tub transfer bench after thorough review and will benefit from use of home due to having a tub/shower combination bathroom  Pt in bedside recliner with Min A overall for transfer upon termination of tx session  All items with in reach  Pt reports now having 8/10 pain in LLE  Reported all findings to nursing staff  Pt may benefit from further rehab with focus on achieving optimal performance levels with all functional tasks   Continue to recommend Inpatient rehab   Plan   Treatment Interventions ADL retraining;Functional transfer training;UE strengthening/ROM   Goal Expiration Date 01/05/21   OT Treatment Day 1   OT Frequency 3-5x/wk   Recommendation   OT Discharge Recommendation Post-Acute Rehabilitation Services   OT - OK to Discharge Yes  (when medically cleared  )   DEMETRIA Calderon

## 2020-12-24 NOTE — ASSESSMENT & PLAN NOTE
One of 2 sets from 12/18/2020  Felt to be transient bacteremia in setting of osteomyelitis versus contaminant, Infectious Disease following  2D echo without evidence of vegetation  Patient currently on Flagyl and cefepime, further antibiotic management per Infectious Disease  Status post tunneled central venous catheter placement for long-term antibiotics per ID recommendations

## 2020-12-24 NOTE — ASSESSMENT & PLAN NOTE
Lab Results   Component Value Date    HGBA1C 7 4 (H) 12/18/2020       Recent Labs     12/23/20 2016 12/24/20  0648 12/24/20  1139 12/24/20  1624   POCGLU 122 99 119 171*       Blood Sugar Average: Last 72 hrs:  (P) 152     Acceptable control with hemoglobin A1c of 7 4  Home Regimen is 27U HS and 10-12U TID AC    Continue Lantus to 20 units HS, proceed with Accu-Cheks and insulin sliding scale

## 2020-12-24 NOTE — PLAN OF CARE
Problem: Potential for Falls  Goal: Patient will remain free of falls  Description: INTERVENTIONS:  - Assess patient frequently for physical needs  -  Identify cognitive and physical deficits and behaviors that affect risk of falls    -  [place] fall precautions as indicated by assessment   - Educate patient/family on patient safety including physical limitations  - Instruct patient to call for assistance with activity based on assessment  - Modify environment to reduce risk of injury  - Consider OT/PT consult to assist with strengthening/mobility  Outcome: Progressing     Problem: MUSCULOSKELETAL - ADULT  Goal: Maintain or return mobility to safest level of function  Description: INTERVENTIONS:  - Assess patient's ability to carry out ADLs; assess patient's baseline for ADL function and identify physical deficits which impact ability to perform ADLs (bathing, care of mouth/teeth, toileting, grooming, dressing, etc )  - Assess/evaluate cause of self-care deficits   - Assess range of motion  - Assess patient's mobility  - Assess patient's need for assistive devices and provide as appropriate  - Encourage maximum independence but intervene and supervise when necessary  - Involve family in performance of ADLs  - Assess for home care needs following discharge   - Consider OT consult to assist with ADL evaluation and planning for discharge  - Provide patient education as appropriate  Outcome: Progressing  Goal: Maintain proper alignment of affected body part  Description: INTERVENTIONS:  - Support, maintain and protect limb and body alignment  - Provide patient/ family with appropriate education  Outcome: Progressing     Problem: PAIN - ADULT  Goal: Verbalizes/displays adequate comfort level or baseline comfort level  Description: Interventions:  - Encourage patient to monitor pain and request assistance  - Assess pain using appropriate pain scale  - Administer analgesics based on type and severity of pain and evaluate response  - Implement non-pharmacological measures as appropriate and evaluate response  - Consider cultural and social influences on pain and pain management  - Notify physician/advanced practitioner if interventions unsuccessful or patient reports new pain  Outcome: Progressing     Problem: INFECTION - ADULT  Goal: Absence or prevention of progression during hospitalization  Description: INTERVENTIONS:  - Assess and monitor for signs and symptoms of infection  - Monitor lab/diagnostic results  - Monitor all insertion sites, i e  indwelling lines, tubes, and drains  - Monitor endotracheal if appropriate and nasal secretions for changes in amount and color  - [place] appropriate cooling/warming therapies per order  - Administer medications as ordered  - Instruct and encourage patient and family to use good hand hygiene technique  - Identify and instruct in appropriate isolation precautions for identified infection/condition  Outcome: Progressing  Goal: Absence of fever/infection during neutropenic period  Description: INTERVENTIONS:  - Monitor WBC    Outcome: Progressing     Problem: SAFETY ADULT  Goal: Patient will remain free of falls  Description: INTERVENTIONS:  - Assess patient frequently for physical needs  -  Identify cognitive and physical deficits and behaviors that affect risk of falls    -  [place] fall precautions as indicated by assessment   - Educate patient/family on patient safety including physical limitations  - Instruct patient to call for assistance with activity based on assessment  - Modify environment to reduce risk of injury  - Consider OT/PT consult to assist with strengthening/mobility  Outcome: Progressing  Goal: Maintain or return to baseline ADL function  Description: INTERVENTIONS:  -  Assess patient's ability to carry out ADLs; assess patient's baseline for ADL function and identify physical deficits which impact ability to perform ADLs (bathing, care of mouth/teeth, toileting, grooming, dressing, etc )  - Assess/evaluate cause of self-care deficits   - Assess range of motion  - Assess patient's mobility; develop plan if impaired  - Assess patient's need for assistive devices and provide as appropriate  - Encourage maximum independence but intervene and supervise when necessary  - Involve family in performance of ADLs  - Assess for home care needs following discharge   - Consider OT consult to assist with ADL evaluation and planning for discharge  - Provide patient education as appropriate  Outcome: Progressing  Goal: Maintain or return mobility status to optimal level  Description: INTERVENTIONS:  - Assess patient's baseline mobility status (ambulation, transfers, stairs, etc )    - Identify cognitive and physical deficits and behaviors that affect mobility  - Identify mobility aids required to assist with transfers and/or ambulation (gait belt, sit-to-stand, lift, walker, cane, etc )  - [place] fall precautions as indicated by assessment  - Record patient progress and toleration of activity level on Mobility SBAR; progress patient to next Phase/Stage  - Instruct patient to call for assistance with activity based on assessment  - Consider rehabilitation consult to assist with strengthening/weightbearing, etc   Outcome: Progressing     Problem: DISCHARGE PLANNING  Goal: Discharge to home or other facility with appropriate resources  Description: INTERVENTIONS:  - Identify barriers to discharge w/patient and caregiver  - Arrange for needed discharge resources and transportation as appropriate  - Identify discharge learning needs (meds, wound care, etc )  - Arrange for interpretive services to assist at discharge as needed  - Refer to Case Management Department for coordinating discharge planning if the patient needs post-hospital services based on physician/advanced practitioner order or complex needs related to functional status, cognitive ability, or social support system  Outcome: Progressing     Problem: Knowledge Deficit  Goal: Patient/family/caregiver demonstrates understanding of disease process, treatment plan, medications, and discharge instructions  Description: Complete learning assessment and assess knowledge base    Interventions:  - Provide teaching at level of understanding  - Provide teaching via preferred learning methods  Outcome: Progressing

## 2020-12-24 NOTE — ASSESSMENT & PLAN NOTE
One of 2 sets from 12/18/2020  Felt to be transient bacteremia in setting of osteomyelitis versus contaminant, Infectious Disease following  2D echo without evidence of vegetation  Patient currently on Flagyl and cefepime, further antibiotic management per Infectious Disease

## 2020-12-24 NOTE — CONSULTS
Consultation - Interventional Radiology  Manuel Izaguirre 66 y o  male MRN: [id number]  Unit/Bed#: E5 -01 Encounter: [id number]        Consults     75M with toe osteomyelitis, patient will require prolonged antibiotics    Durable central venous access is indicated    He has CKD therefore arm PICC not appropriate    We will place tunneled jugular venous access    Risks benefits alternatives discussed    Local and fentanyl    ASSESSMENT/PLAN:  Problem List     * (Principal) Subacute osteomyelitis of left foot (HCC)    CAD (coronary artery disease)    Overview Signed 8/16/2016  8:07 AM by Myriam Ewing DO     Last Assessment & Plan:   Remains asymptomatic  Continue current meds  Chronic obstructive pulmonary disease (HCC)    Carcinoma in situ of bladder    Hypertension with renal disease    Overview Addendum 11/21/2018  3:01 PM by Erasmo Foreman MD     Last Assessment & Plan:   Controlled           Hyperlipidemia    Overview Signed 8/16/2016  8:07 AM by Myriam Ewing DO     Last Assessment & Plan:   At goal          Presence of stent in coronary artery    Type 2 diabetes mellitus, with long-term current use of insulin ([facility])    Lab Results   Component Value Date    HGBA1C 7 4 (H) 12/18/2020       Recent Labs     12/23/20  1532 12/23/20  2016 12/24/20  0648 12/24/20  1139   POCGLU 183* 122 99 119       Blood Sugar Average: Last 72 hrs:  (P) 150 [id number]        CKD (chronic kidney disease) stage 3, GFR 30-59 ml/min ([facility])    Lab Results   Component Value Date    EGFR 27 12/24/2020    EGFR 32 12/23/2020    EGFR 29 12/22/2020    CREATININE 2 54 (H) 12/24/2020    CREATININE 2 19 (H) 12/23/2020    CREATININE 2 40 (H) 12/22/2020         Primary osteoarthritis of left knee    Malignant tumor of urinary bladder (HCC)    Cystitis due to intravesical BCG administration    Degeneration of lumbar intervertebral disc    Back pain    Arteriosclerosis of artery of extremity (HCC)    Stable angina pectoris ([address] ) Herpes zoster without complication    Localized edema    Superficial phlebitis    Preop examination    Acute cystitis without hematuria    Acute kidney injury superimposed on chronic kidney disease ([facility])    Lab Results   Component Value Date    EGFR 27 12/24/2020    EGFR 32 12/23/2020    EGFR 29 12/22/2020    CREATININE 2 54 (H) 12/24/2020    CREATININE 2 19 (H) 12/23/2020    CREATININE 2 40 (H) 12/22/2020         Secondary renal hyperparathyroidism (HCC)    Bladder cancer ([facility])    Abnormal MRI, lumbar spine    Diabetic polyneuropathy associated with type 2 diabetes mellitus ([facility] )    Lab Results   Component Value Date    HGBA1C 7 4 (H) 12/18/2020       Recent Labs     12/23/20  1532 12/23/20  2016 12/24/20  0648 12/24/20  1139   POCGLU 183* 122 99 119       Blood Sugar Average: Last 72 hrs:  (P) 150 [id number]        Dysuria    Diabetic ulcer of left foot associated with type 2 diabetes mellitus, 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 [facility])    Lab Results   Component Value Date    HGBA1C 7 4 (H) 12/18/2020       Recent Labs     12/23/20  1532 12/23/20  2016 12/24/20  0648 12/24/20  1139   POCGLU 183* 122 99 119       Blood Sugar Average: Last 72 hrs:  (P) 150 [id number]        CKD (chronic kidney disease) stage 4, GFR 15-29 ml/min ([facility])    Lab Results   Component Value Date    EGFR 27 12/24/2020    EGFR 32 12/23/2020    EGFR 29 12/22/2020    CREATININE 2 54 (H) 12/24/2020    CREATININE 2 19 (H) 12/23/2020    CREATININE 2 40 (H) 12/22/2020         Chronic anemia    Anemia of chronic disease    Preoperative examination    PVD (peripheral vascular disease) ([facility])    Left carotid bruit    MSSA bacteremia          Reason for Consult / Principal Problem:    HPI: Paris Cabrales is a 66y o  year old male who presents with Subacute osteomyelitis of left foot ([place] [address] )      Review of Systems      Past Medical History:  Past Medical History:   Diagnosis Date    Anemia     Last assessed: 9/28/17   Marcelo Bones Arteriosclerotic cardiovascular disease     Last assessed: 9/28/17    Bladder cancer ([facility] )     bladder    CAD (coronary artery disease)     Chronic kidney disease     CKD (chronic kidney disease) stage 4, GFR 15-29 ml/min ([facility])     Colon polyp     Diabetes mellitus ([facility] )     Foot ulcer ([facility] ) 09/2020    left foot- treated with antibiotics, followed by podiatrist    GERD (gastroesophageal reflux disease)     Hypertension     Hypotension     Last assessed: 2/24/15    Insomnia     Last assessed: 11/14/12    Loss of hearing     has hearing aids but usually does not wear them    Other seasonal allergic rhinitis     Last assessed: 2/10/16    Shortness of breath     Transient cerebral ischemia     Wears glasses        Past Surgical History:  Past Surgical History:   Procedure Laterality Date    CARDIAC SURGERY      Cath stent placement  Last assessed: 3/9/17  Interventional Catheterization    CHOLECYSTECTOMY      COLONOSCOPY      CYSTOSCOPY      Diagnostic w/biopsy  Mona Banner  Last assessed: 12/1/14    CYSTOURETHROSCOPY      w/cautery  Mona Kaycee    GASTRIC BYPASS      For morbid obesity w/Shaji-en-Y  Resolved: 11/17/09    INCISION AND DRAINAGE OF WOUND Right 2/26/2017    Procedure: INCISION AND DRAINAGE (I&D) EXTREMITY WITH APPLICATION OF GRAFT JACKET;  Surgeon: Vani Bishop DPM;  [facility];  Service:     INCISION AND DRAINAGE OF WOUND Right 4/25/2017    Procedure: INCISION AND DRAINAGE (I&D) EXTREMITY, APPLICATION OF GRAFT;  Surgeon: Vani Bishop DPM;  [facility];  Service:     IR BIOPSY OTHER  7/2/2020    JOINT REPLACEMENT      Knee   Last assessed: 1/28/11    LA AMPUTATION METATARSAL+TOE,SINGLE Left 12/21/2020    Procedure: RAY RESECTION FOOT;  Surgeon: Sara Murray DPM;  [facility];  Service: Podiatry    LA CYSTOURETHROSCOPY,BIOPSY N/A 8/16/2016    Procedure: Soloemae Jaguar;  Surgeon: Denver Fey, MD;  [facility];  Service: Urology    TX CYSTOURETHROSCOPY,FULGUR <0 5 CM LESN N/A 2020    Procedure: CYSTO W/BIOPSIES, transurethral prostate bx;  Surgeon: Augusta Bowie MD;  [facility];  Service: Urology    [address]      Surgery Shaji-en-Y    SPINAL FUSION      lumbar and cervical fusions    VAC DRESSING APPLICATION Right     Procedure: APPLICATION VAC DRESSING;  Surgeon: Therese Juares DPM;  [facility];  Service:        Social History:  Social History     Substance and Sexual Activity   Alcohol Use Yes    Frequency: 2-3 times a week    Drinks per session: 1 or 2    Binge frequency: Never     Social History     Substance and Sexual Activity   Drug Use Not Currently    Types: Marijuana    Comment: quit 2019 had medical marijuana     Social History     Tobacco Use   Smoking Status Former Smoker    Quit date:     Years since quittin 0   Smokeless Tobacco Never Used       Family History:  Family History   Problem Relation Age of Onset    Diabetes Mother     Heart disease Mother     Other Mother         High blood pressure    Heart disease Father     Diabetes Sister     Other Sister         High blood pressure    Kidney disease Sister     Heart disease Brother     Other Brother         High blood pressure       Allergies:   Allergies   Allergen Reactions    Atorvastatin Hives, Itching and Rash    Simvastatin Rash and Edema     "ALL STATINS"    Statins Itching    Insulin Lispro Swelling and Edema    Medical Tape      rash to electrodes    Other Itching, Rash and Other (See Comments)     rash to electrodes and adhesives       Medications:  Medications Prior to Admission   Medication    ALPRAZolam (XANAX) 0 25 mg tablet    aspirin 81 MG tablet    Azelastine HCl 0 15 % SOLN    bismuth subsalicylate (PEPTO BISMOL) 262 MG chewable tablet    calcitriol (ROCALTROL) 0 25 mcg capsule    Cholecalciferol (Vitamin D3) 1 25 MG (16240 UT) CAPS    fluticasone (FLONASE) 50 mcg/act nasal spray    furosemide (LASIX) 20 mg tablet    gabapentin (NEURONTIN) 300 mg capsule    Insulin Pen Needle 31G X 8 MM MISC    isosorbide mononitrate (IMDUR) 30 mg 24 hr tablet    latanoprost (XALATAN) 0 005 % ophthalmic solution    metoprolol succinate (TOPROL-XL) 100 mg 24 hr tablet    multivitamin (THERAGRAN) TABS    NovoLOG FlexPen 100 units/mL SOPN    omeprazole (PriLOSEC) 20 mg delayed release capsule    oxyCODONE (Roxicodone) 5 mg immediate release tablet    sertraline (ZOLOFT) 50 mg tablet    Lantus SoloStar 100 units/mL injection pen    tamsulosin (FLOMAX) 0 4 mg     Current Facility-Administered Medications   Medication Dose Route Frequency    acetaminophen (TYLENOL) tablet 975 mg  975 mg Oral Q6H [facility]    ALPRAZolam (XANAX) tablet 0 25 mg  0 25 mg Oral HS PRN    aspirin chewable tablet 81 mg  81 mg Oral Daily    cefepime (MAXIPIME) 2,000 mg in dextrose 5 % 50 mL IVPB  2,000 mg Intravenous Q12H    fluticasone (FLONASE) 50 mcg/act nasal spray 1 spray  1 spray Each Nare Daily    gabapentin (NEURONTIN) capsule 600 mg  600 mg Oral Daily    heparin (porcine) subcutaneous injection 5,000 Units  5,000 Units Subcutaneous Q12H [facility]    insulin glargine (LANTUS) subcutaneous injection 20 Units 0 2 mL  20 Units Subcutaneous HS    insulin lispro (HumaLOG) 100 units/mL subcutaneous injection 1-5 Units  1-5 Units Subcutaneous 4x Daily (AC & HS)    isosorbide mononitrate (IMDUR) 24 hr tablet 90 mg  90 mg Oral Daily    latanoprost (XALATAN) 0 005 % ophthalmic solution 1 drop  1 drop Both Eyes HS    melatonin tablet 3 mg  3 mg Oral HS PRN    metoprolol succinate (TOPROL-XL) 24 hr tablet 100 mg  100 mg Oral Daily    metroNIDAZOLE (FLAGYL) tablet 500 mg  500 mg Oral Q8H    ondansetron (ZOFRAN) injection 4 mg  4 mg Intravenous Q6H PRN    oxyCODONE (ROXICODONE) IR tablet 5 mg  5 mg Oral Q6H PRN    pantoprazole (PROTONIX) EC tablet 40 mg  40 mg Oral Early Morning    polyethylene glycol (MIRALAX) packet 17 g  17 g Oral Daily    senna-docusate sodium (SENOKOT S) 8 6-50 mg per tablet 1 tablet  1 tablet Oral HS    sertraline (ZOLOFT) tablet 50 mg  50 mg Oral Daily    sodium chloride 0 9 % infusion  50 mL/hr Intravenous Continuous    tamsulosin (FLOMAX) capsule 0 4 mg  0 4 mg Oral HS    traMADol (ULTRAM) tablet 50 mg  50 mg Oral Q12H PRN       Vitals:  /72 (BP Location: Left arm)   Pulse 60   Temp 97 9 °F (36 6 °C) (Temporal)   Resp 18   Wt 110 kg (241 lb 6 5 oz)   SpO2 99%   BMI 29 38 kg/m²   Body mass index is 29 38 kg/m²    Weight (last 2 days)     Date/Time   Weight    12/24/20 0547   110 (241 4)              I/Os:    Intake/Output Summary (Last 24 hours) at 12/24/2020 1413  Last data filed at 12/24/2020 1300  Gross per 24 hour   Intake 3950 ml   Output 1100 ml   Net 2850 ml       PHYSICAL EXAM      Alert, oriented, neck is unremarkable    Lab Results and Cultures:   CBC with diff:   Lab Results   Component Value Date    WBC 8 99 12/23/2020    HGB 9 2 (L) 12/23/2020    HCT 28 5 (L) 12/23/2020    MCV 94 12/23/2020     12/23/2020    MCH 30 4 12/23/2020    MCHC 32 3 12/23/2020    RDW 13 6 12/23/2020    MPV 10 7 12/23/2020    NRBC 0 12/23/2020      BMP/CMP:  Lab Results   Component Value Date     09/03/2015    K 4 5 12/24/2020    K 4 5 09/03/2015     12/24/2020     09/03/2015    CO2 24 12/24/2020    CO2 28 6 09/03/2015    ANIONGAP 5 09/03/2015    BUN 39 (H) 12/24/2020    BUN 19 09/03/2015    CREATININE 2 54 (H) 12/24/2020    CREATININE 1 37 (H) 09/03/2015    GLUCOSE 203 (H) 09/03/2015    CALCIUM 8 7 12/24/2020    CALCIUM 9 0 09/03/2015    AST 22 12/18/2020    AST 56 (H) 07/26/2015    ALT 61 12/18/2020    ALT 84 (H) 07/26/2015    ALKPHOS 115 12/18/2020    ALKPHOS 93 07/26/2015    PROT 6 4 07/26/2015    BILITOT 0 50 07/26/2015    EGFR 27 12/24/2020   ,     Coags:   Lab Results   Component Value Date    PTT 30 09/19/2018 PTT 32 09/03/2015    INR 0 98 02/03/2019    INR 0 93 09/03/2015   ,          Lipid Panel:   Lab Results   Component Value Date    CHOL 139 03/17/2015     Lab Results   Component Value Date    HDL 43 10/16/2020     Lab Results   Component Value Date    HDL 43 10/16/2020     Lab Results   Component Value Date    LDLCALC 66 10/16/2020     Lab Results   Component Value Date    TRIG 157 (H) 10/16/2020       HgbA1c:   Lab Results   Component Value Date    HGBA1C 7 4 (H) 12/18/2020    HGBA1C 7 5 (H) 10/16/2020    HGBA1C 7 5 (H) 06/24/2020       Blood Culture:   Lab Results   Component Value Date    BLOODCX No Growth at 72 hrs  12/21/2020   ,   Urinalysis:   Lab Results   Component Value Date    COLORU Yellow 08/17/2020    COLORU Yellow 07/18/2016    CLARITYU Cloudy 08/17/2020    CLARITYU Transparent 07/18/2016    SPECGRAV 1 018 08/17/2020    SPECGRAV 1 020 07/18/2016    PHUR 6 0 08/17/2020    PHUR 5 5 06/29/2020    PHUR 6 0 07/18/2016    LEUKOCYTESUR Large (A) 08/17/2020    LEUKOCYTESUR - 07/18/2016    NITRITE Negative 08/17/2020    NITRITE - 07/18/2016    PROTEINUA - 07/18/2016    GLUCOSEU Negative 08/17/2020    GLUCOSEU 500 (1/2%) (A) 09/24/2015    KETONESU Negative 08/17/2020    KETONESU - 07/18/2016    BILIRUBINUR Negative 08/17/2020    BILIRUBINUR - 01/18/2016    BLOODU Moderate (A) 08/17/2020    BLOODU trace 07/18/2016   ,   Urine Culture:   Lab Results   Component Value Date    URINECX No Growth <1000 cfu/mL 11/16/2020   ,   Wound Culure:    Lab Results   Component Value Date    WOUNDCULT 1+ Growth of Staphylococcus aureus (A) 12/21/2020    WOUNDCULT Few Colonies of Enterobacter aerogenes (A) 12/21/2020       Imaging Studies: I have personally reviewed pertinent films in PACS    Counseling / Coordination of Care  Total time spent today  20 minutes  Greater than 50% of total time was spent with the patient and / or family counseling and / or coordination of care

## 2020-12-25 LAB
ANION GAP SERPL CALCULATED.3IONS-SCNC: 7 MMOL/L (ref 4–13)
BACTERIA TISS AEROBE CULT: ABNORMAL
BASOPHILS # BLD AUTO: 0.04 THOUSANDS/ΜL (ref 0–0.1)
BASOPHILS NFR BLD AUTO: 1 % (ref 0–1)
BUN SERPL-MCNC: 32 MG/DL (ref 5–25)
CALCIUM SERPL-MCNC: 8.4 MG/DL (ref 8.3–10.1)
CHLORIDE SERPL-SCNC: 108 MMOL/L (ref 100–108)
CO2 SERPL-SCNC: 24 MMOL/L (ref 21–32)
CREAT SERPL-MCNC: 2.08 MG/DL (ref 0.6–1.3)
EOSINOPHIL # BLD AUTO: 0.19 THOUSAND/ΜL (ref 0–0.61)
EOSINOPHIL NFR BLD AUTO: 2 % (ref 0–6)
ERYTHROCYTE [DISTWIDTH] IN BLOOD BY AUTOMATED COUNT: 13.4 % (ref 11.6–15.1)
GFR SERPL CREATININE-BSD FRML MDRD: 34 ML/MIN/1.73SQ M
GLUCOSE SERPL-MCNC: 101 MG/DL (ref 65–140)
GLUCOSE SERPL-MCNC: 177 MG/DL (ref 65–140)
GLUCOSE SERPL-MCNC: 179 MG/DL (ref 65–140)
GLUCOSE SERPL-MCNC: 76 MG/DL (ref 65–140)
GLUCOSE SERPL-MCNC: 76 MG/DL (ref 65–140)
GRAM STN SPEC: ABNORMAL
HCT VFR BLD AUTO: 27.5 % (ref 36.5–49.3)
HGB BLD-MCNC: 8.8 G/DL (ref 12–17)
IMM GRANULOCYTES # BLD AUTO: 0.07 THOUSAND/UL (ref 0–0.2)
IMM GRANULOCYTES NFR BLD AUTO: 1 % (ref 0–2)
LYMPHOCYTES # BLD AUTO: 1.55 THOUSANDS/ΜL (ref 0.6–4.47)
LYMPHOCYTES NFR BLD AUTO: 19 % (ref 14–44)
MCH RBC QN AUTO: 30.1 PG (ref 26.8–34.3)
MCHC RBC AUTO-ENTMCNC: 32 G/DL (ref 31.4–37.4)
MCV RBC AUTO: 94 FL (ref 82–98)
MONOCYTES # BLD AUTO: 0.91 THOUSAND/ΜL (ref 0.17–1.22)
MONOCYTES NFR BLD AUTO: 11 % (ref 4–12)
NEUTROPHILS # BLD AUTO: 5.34 THOUSANDS/ΜL (ref 1.85–7.62)
NEUTS SEG NFR BLD AUTO: 66 % (ref 43–75)
NRBC BLD AUTO-RTO: 0 /100 WBCS
PLATELET # BLD AUTO: 166 THOUSANDS/UL (ref 149–390)
PMV BLD AUTO: 10.3 FL (ref 8.9–12.7)
POTASSIUM SERPL-SCNC: 4 MMOL/L (ref 3.5–5.3)
RBC # BLD AUTO: 2.92 MILLION/UL (ref 3.88–5.62)
SODIUM SERPL-SCNC: 139 MMOL/L (ref 136–145)
WBC # BLD AUTO: 8.1 THOUSAND/UL (ref 4.31–10.16)

## 2020-12-25 PROCEDURE — 85025 COMPLETE CBC W/AUTO DIFF WBC: CPT | Performed by: FAMILY MEDICINE

## 2020-12-25 PROCEDURE — 99232 SBSQ HOSP IP/OBS MODERATE 35: CPT | Performed by: INTERNAL MEDICINE

## 2020-12-25 PROCEDURE — 82948 REAGENT STRIP/BLOOD GLUCOSE: CPT

## 2020-12-25 PROCEDURE — 99232 SBSQ HOSP IP/OBS MODERATE 35: CPT | Performed by: PHYSICIAN ASSISTANT

## 2020-12-25 PROCEDURE — 80048 BASIC METABOLIC PNL TOTAL CA: CPT | Performed by: FAMILY MEDICINE

## 2020-12-25 RX ORDER — LOPERAMIDE HYDROCHLORIDE 2 MG/1
2 CAPSULE ORAL 3 TIMES DAILY PRN
Status: DISCONTINUED | OUTPATIENT
Start: 2020-12-25 | End: 2021-01-06 | Stop reason: HOSPADM

## 2020-12-25 RX ADMIN — METOPROLOL SUCCINATE 100 MG: 50 TABLET, EXTENDED RELEASE ORAL at 09:21

## 2020-12-25 RX ADMIN — OXYCODONE HYDROCHLORIDE 5 MG: 5 TABLET ORAL at 21:51

## 2020-12-25 RX ADMIN — ACETAMINOPHEN 975 MG: 325 TABLET ORAL at 23:30

## 2020-12-25 RX ADMIN — FLUTICASONE PROPIONATE 1 SPRAY: 50 SPRAY, METERED NASAL at 09:22

## 2020-12-25 RX ADMIN — SERTRALINE HYDROCHLORIDE 50 MG: 50 TABLET ORAL at 09:21

## 2020-12-25 RX ADMIN — SODIUM CHLORIDE 50 ML/HR: 0.9 INJECTION, SOLUTION INTRAVENOUS at 09:27

## 2020-12-25 RX ADMIN — METRONIDAZOLE 500 MG: 500 TABLET ORAL at 06:12

## 2020-12-25 RX ADMIN — OXYCODONE HYDROCHLORIDE 5 MG: 5 TABLET ORAL at 01:48

## 2020-12-25 RX ADMIN — PANTOPRAZOLE SODIUM 40 MG: 40 TABLET, DELAYED RELEASE ORAL at 06:13

## 2020-12-25 RX ADMIN — TAMSULOSIN HYDROCHLORIDE 0.4 MG: 0.4 CAPSULE ORAL at 21:51

## 2020-12-25 RX ADMIN — ISOSORBIDE MONONITRATE 90 MG: 30 TABLET, EXTENDED RELEASE ORAL at 09:21

## 2020-12-25 RX ADMIN — HEPARIN SODIUM 5000 UNITS: 5000 INJECTION INTRAVENOUS; SUBCUTANEOUS at 21:52

## 2020-12-25 RX ADMIN — LATANOPROST 1 DROP: 50 SOLUTION OPHTHALMIC at 21:52

## 2020-12-25 RX ADMIN — OXYCODONE HYDROCHLORIDE 5 MG: 5 TABLET ORAL at 09:22

## 2020-12-25 RX ADMIN — GABAPENTIN 600 MG: 300 CAPSULE ORAL at 09:21

## 2020-12-25 RX ADMIN — INSULIN GLARGINE 20 UNITS: 100 INJECTION, SOLUTION SUBCUTANEOUS at 21:52

## 2020-12-25 RX ADMIN — TRAMADOL HYDROCHLORIDE 50 MG: 50 TABLET, FILM COATED ORAL at 06:17

## 2020-12-25 RX ADMIN — INSULIN LISPRO 1 UNITS: 100 INJECTION, SOLUTION INTRAVENOUS; SUBCUTANEOUS at 09:22

## 2020-12-25 RX ADMIN — CEFEPIME HYDROCHLORIDE 2000 MG: 2 INJECTION, POWDER, FOR SOLUTION INTRAVENOUS at 11:42

## 2020-12-25 RX ADMIN — CEFEPIME HYDROCHLORIDE 2000 MG: 2 INJECTION, POWDER, FOR SOLUTION INTRAVENOUS at 23:30

## 2020-12-25 RX ADMIN — INSULIN LISPRO 1 UNITS: 100 INJECTION, SOLUTION INTRAVENOUS; SUBCUTANEOUS at 21:52

## 2020-12-25 RX ADMIN — HEPARIN SODIUM 5000 UNITS: 5000 INJECTION INTRAVENOUS; SUBCUTANEOUS at 09:22

## 2020-12-25 RX ADMIN — ASPIRIN 81 MG CHEWABLE TABLET 81 MG: 81 TABLET CHEWABLE at 09:22

## 2020-12-25 RX ADMIN — OXYCODONE HYDROCHLORIDE 5 MG: 5 TABLET ORAL at 15:22

## 2020-12-25 NOTE — ASSESSMENT & PLAN NOTE
One of 2 sets from 12/18/2020  Felt to be transient bacteremia in setting of osteomyelitis versus contaminant  Infectious Disease following  2D echo without evidence of vegetation  Initially on Flagyl and cefepime   Further antibiotic management per Infectious Disease  Who now suggests continuing with cefepime alone  Status post tunneled central venous catheter placement for long-term antibiotics per ID recommendations

## 2020-12-25 NOTE — PROGRESS NOTES
Podiatry - Progress Note  Patient: Paris Cabrales 66 y o  male   MRN: [id number]  PCP: Luisana Govea MD  Unit/Bed#: E5 -61 Encounter: [id number]  Date Of Visit: 20    ASSESSMENT:    Paris Cabrales is a 66 y o  male with:    1  Left foot osteomyelitis of 5th metatarsal, clean cultures growth in broth only  2  Left foot cellulitis  3  MSSA bacteremia   4  Insulin Dependent Diabetes mellitus  5  Coronary arterial disease  6  Peripheral arterial disease  7  Hypertension      PLAN:    · Final intra-op clean culture with Enterobacter growth in broth only  6 weeks total cefepime per discussion with ID  IJ line in place  · Strict NWB to left foot  · Patient remains afebrile and hemodynamically stable  · CM on board for rehab placement  SUBJECTIVE:     The patient was seen, evaluated, and assessed at bedside today  The patient was awake, alert, and in no acute distress  No acute events overnight  The patient reports no complaints today  Patient denies N/V/F/chills/SOB/CP  OBJECTIVE:     Vitals:   /87 (BP Location: Right arm)   Pulse 66   Temp 97 9 °F (36 6 °C) (Temporal)   Resp 18   Wt 105 kg (232 lb 9 4 oz)   SpO2 99%   BMI 28 31 kg/m²     Temp (24hrs), Av 7 °F (36 5 °C), Min:97 2 °F (36 2 °C), Max:98 °F (36 7 °C)      Physical Exam:     General:  Alert, cooperative, and in no distress  Lower extremity exam:  Cardiovascular status at baseline  Neurological status at baseline  Musculoskeletal status at baseline  No calf tenderness noted  Dressing c/d/i without erythema or lymphangitis       Additional Data:     Labs:    Results from last 7 days   Lab Units 20  0550   WBC Thousand/uL 8 10   HEMOGLOBIN g/dL 8 8*   HEMATOCRIT % 27 5*   PLATELETS Thousands/uL 166   NEUTROS PCT % 66   LYMPHS PCT % 19   MONOS PCT % 11   EOS PCT % 2     Results from last 7 days   Lab Units 20  0533  20  1705   POTASSIUM mmol/L 4 5   < > 5 0   CHLORIDE mmol/L 107   < > 106   CO2 mmol/L 24 < > 27   BUN mg/dL 39*   < > 50*   CREATININE mg/dL 2 54*   < > 2 47*   CALCIUM mg/dL 8 7   < > 8 9   ALK PHOS U/L  --   --  115   ALT U/L  --   --  61   AST U/L  --   --  22    < > = values in this interval not displayed  * I Have Reviewed All Lab Data Listed Above  Recent Cultures (last 7 days):     Results from last 7 days   Lab Units 12/21/20  1409 12/21/20  1408 12/21/20  0643 12/21/20  0642 12/18/20  1857 12/18/20  1706 12/18/20  1653   BLOOD CULTURE   --   --  No Growth at 72 hrs  No Growth at 72 hrs   --  No Growth After 5 Days  Staphylococcus aureus*   GRAM STAIN RESULT  No Polys or Bacteria seen No Polys  No organisms seen  --   --  Rare Polys*  3+ Gram positive cocci in clusters*  --  Gram positive cocci in clusters*   WOUND CULTURE   --  1+ Growth of Staphylococcus aureus*  Few Colonies of Enterobacter aerogenes*  --   --  2+ Growth of Enterobacter aerogenes*  4+ Growth of Staphylococcus aureus*  --   --      Results from last 7 days   Lab Units 12/21/20  1409 12/21/20  1408 12/18/20  1857   ANAEROBIC CULTURE  No growth No anaerobes isolated 4+ Growth of   1+ Growth of Prevotella bivia*       Imaging: I have personally reviewed pertinent films in PACS  EKG, Pathology, and Other Studies: I have personally reviewed pertinent reports  ** Please Note: Portions of the record may have been created with voice recognition software  Occasional wrong word or "sound a like" substitutions may have occurred due to the inherent limitations of voice recognition software  Read the chart carefully and recognize, using context, where substitutions have occurred   **

## 2020-12-25 NOTE — PLAN OF CARE
Problem: Potential for Falls  Goal: Patient will remain free of falls  Description: INTERVENTIONS:  - Assess patient frequently for physical needs  -  Identify cognitive and physical deficits and behaviors that affect risk of falls    -  [place] fall precautions as indicated by assessment   - Educate patient/family on patient safety including physical limitations  - Instruct patient to call for assistance with activity based on assessment  - Modify environment to reduce risk of injury  - Consider OT/PT consult to assist with strengthening/mobility  Outcome: Progressing     Problem: MUSCULOSKELETAL - ADULT  Goal: Maintain or return mobility to safest level of function  Description: INTERVENTIONS:  - Assess patient's ability to carry out ADLs; assess patient's baseline for ADL function and identify physical deficits which impact ability to perform ADLs (bathing, care of mouth/teeth, toileting, grooming, dressing, etc )  - Assess/evaluate cause of self-care deficits   - Assess range of motion  - Assess patient's mobility  - Assess patient's need for assistive devices and provide as appropriate  - Encourage maximum independence but intervene and supervise when necessary  - Involve family in performance of ADLs  - Assess for home care needs following discharge   - Consider OT consult to assist with ADL evaluation and planning for discharge  - Provide patient education as appropriate  Outcome: Progressing  Goal: Maintain proper alignment of affected body part  Description: INTERVENTIONS:  - Support, maintain and protect limb and body alignment  - Provide patient/ family with appropriate education  Outcome: Progressing     Problem: PAIN - ADULT  Goal: Verbalizes/displays adequate comfort level or baseline comfort level  Description: Interventions:  - Encourage patient to monitor pain and request assistance  - Assess pain using appropriate pain scale  - Administer analgesics based on type and severity of pain and evaluate response  - Implement non-pharmacological measures as appropriate and evaluate response  - Consider cultural and social influences on pain and pain management  - Notify physician/advanced practitioner if interventions unsuccessful or patient reports new pain  Outcome: Progressing     Problem: INFECTION - ADULT  Goal: Absence or prevention of progression during hospitalization  Description: INTERVENTIONS:  - Assess and monitor for signs and symptoms of infection  - Monitor lab/diagnostic results  - Monitor all insertion sites, i e  indwelling lines, tubes, and drains  - Monitor endotracheal if appropriate and nasal secretions for changes in amount and color  - [place] appropriate cooling/warming therapies per order  - Administer medications as ordered  - Instruct and encourage patient and family to use good hand hygiene technique  - Identify and instruct in appropriate isolation precautions for identified infection/condition  Outcome: Progressing  Goal: Absence of fever/infection during neutropenic period  Description: INTERVENTIONS:  - Monitor WBC    Outcome: Progressing     Problem: SAFETY ADULT  Goal: Patient will remain free of falls  Description: INTERVENTIONS:  - Assess patient frequently for physical needs  -  Identify cognitive and physical deficits and behaviors that affect risk of falls    -  [place] fall precautions as indicated by assessment   - Educate patient/family on patient safety including physical limitations  - Instruct patient to call for assistance with activity based on assessment  - Modify environment to reduce risk of injury  - Consider OT/PT consult to assist with strengthening/mobility  Outcome: Progressing  Goal: Maintain or return to baseline ADL function  Description: INTERVENTIONS:  -  Assess patient's ability to carry out ADLs; assess patient's baseline for ADL function and identify physical deficits which impact ability to perform ADLs (bathing, care of mouth/teeth, toileting, grooming, dressing, etc )  - Assess/evaluate cause of self-care deficits   - Assess range of motion  - Assess patient's mobility; develop plan if impaired  - Assess patient's need for assistive devices and provide as appropriate  - Encourage maximum independence but intervene and supervise when necessary  - Involve family in performance of ADLs  - Assess for home care needs following discharge   - Consider OT consult to assist with ADL evaluation and planning for discharge  - Provide patient education as appropriate  Outcome: Progressing  Goal: Maintain or return mobility status to optimal level  Description: INTERVENTIONS:  - Assess patient's baseline mobility status (ambulation, transfers, stairs, etc )    - Identify cognitive and physical deficits and behaviors that affect mobility  - Identify mobility aids required to assist with transfers and/or ambulation (gait belt, sit-to-stand, lift, walker, cane, etc )  - [place] fall precautions as indicated by assessment  - Record patient progress and toleration of activity level on Mobility SBAR; progress patient to next Phase/Stage  - Instruct patient to call for assistance with activity based on assessment  - Consider rehabilitation consult to assist with strengthening/weightbearing, etc   Outcome: Progressing     Problem: DISCHARGE PLANNING  Goal: Discharge to home or other facility with appropriate resources  Description: INTERVENTIONS:  - Identify barriers to discharge w/patient and caregiver  - Arrange for needed discharge resources and transportation as appropriate  - Identify discharge learning needs (meds, wound care, etc )  - Arrange for interpretive services to assist at discharge as needed  - Refer to Case Management Department for coordinating discharge planning if the patient needs post-hospital services based on physician/advanced practitioner order or complex needs related to functional status, cognitive ability, or social support system  Outcome: Progressing     Problem: Knowledge Deficit  Goal: Patient/family/caregiver demonstrates understanding of disease process, treatment plan, medications, and discharge instructions  Description: Complete learning assessment and assess knowledge base    Interventions:  - Provide teaching at level of understanding  - Provide teaching via preferred learning methods  Outcome: Progressing

## 2020-12-25 NOTE — ASSESSMENT & PLAN NOTE
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 fifth proximal phalangeal base     - s/p RAY RESECTION FOOT (Left) by Podiatry 12/21/2020, management per primary team  - vascular surgery following  - ID consulted after cultures are + for Enterobacter and Staph aureus  - blood cultures positive for MSSA   - will need 6 weeks based off intraop culture  - Flagyl d c by ID  - tunneled catheter placed by IR 12/24/20  - plan to discharge on long-term antibiotics

## 2020-12-25 NOTE — PLAN OF CARE
Problem: Potential for Falls  Goal: Patient will remain free of falls  Description: INTERVENTIONS:  - Assess patient frequently for physical needs  -  Identify cognitive and physical deficits and behaviors that affect risk of falls    -  [place] fall precautions as indicated by assessment   - Educate patient/family on patient safety including physical limitations  - Instruct patient to call for assistance with activity based on assessment  - Modify environment to reduce risk of injury  - Consider OT/PT consult to assist with strengthening/mobility  Outcome: Progressing     Problem: MUSCULOSKELETAL - ADULT  Goal: Maintain or return mobility to safest level of function  Description: INTERVENTIONS:  - Assess patient's ability to carry out ADLs; assess patient's baseline for ADL function and identify physical deficits which impact ability to perform ADLs (bathing, care of mouth/teeth, toileting, grooming, dressing, etc )  - Assess/evaluate cause of self-care deficits   - Assess range of motion  - Assess patient's mobility  - Assess patient's need for assistive devices and provide as appropriate  - Encourage maximum independence but intervene and supervise when necessary  - Involve family in performance of ADLs  - Assess for home care needs following discharge   - Consider OT consult to assist with ADL evaluation and planning for discharge  - Provide patient education as appropriate  Outcome: Progressing  Goal: Maintain proper alignment of affected body part  Description: INTERVENTIONS:  - Support, maintain and protect limb and body alignment  - Provide patient/ family with appropriate education  Outcome: Progressing     Problem: PAIN - ADULT  Goal: Verbalizes/displays adequate comfort level or baseline comfort level  Description: Interventions:  - Encourage patient to monitor pain and request assistance  - Assess pain using appropriate pain scale  - Administer analgesics based on type and severity of pain and evaluate response  - Implement non-pharmacological measures as appropriate and evaluate response  - Consider cultural and social influences on pain and pain management  - Notify physician/advanced practitioner if interventions unsuccessful or patient reports new pain  Outcome: Progressing     Problem: INFECTION - ADULT  Goal: Absence or prevention of progression during hospitalization  Description: INTERVENTIONS:  - Assess and monitor for signs and symptoms of infection  - Monitor lab/diagnostic results  - Monitor all insertion sites, i e  indwelling lines, tubes, and drains  - Monitor endotracheal if appropriate and nasal secretions for changes in amount and color  - [place] appropriate cooling/warming therapies per order  - Administer medications as ordered  - Instruct and encourage patient and family to use good hand hygiene technique  - Identify and instruct in appropriate isolation precautions for identified infection/condition  Outcome: Progressing  Goal: Absence of fever/infection during neutropenic period  Description: INTERVENTIONS:  - Monitor WBC    Outcome: Progressing     Problem: SAFETY ADULT  Goal: Patient will remain free of falls  Description: INTERVENTIONS:  - Assess patient frequently for physical needs  -  Identify cognitive and physical deficits and behaviors that affect risk of falls    -  [place] fall precautions as indicated by assessment   - Educate patient/family on patient safety including physical limitations  - Instruct patient to call for assistance with activity based on assessment  - Modify environment to reduce risk of injury  - Consider OT/PT consult to assist with strengthening/mobility  Outcome: Progressing  Goal: Maintain or return to baseline ADL function  Description: INTERVENTIONS:  -  Assess patient's ability to carry out ADLs; assess patient's baseline for ADL function and identify physical deficits which impact ability to perform ADLs (bathing, care of mouth/teeth, toileting, grooming, dressing, etc )  - Assess/evaluate cause of self-care deficits   - Assess range of motion  - Assess patient's mobility; develop plan if impaired  - Assess patient's need for assistive devices and provide as appropriate  - Encourage maximum independence but intervene and supervise when necessary  - Involve family in performance of ADLs  - Assess for home care needs following discharge   - Consider OT consult to assist with ADL evaluation and planning for discharge  - Provide patient education as appropriate  Outcome: Progressing  Goal: Maintain or return mobility status to optimal level  Description: INTERVENTIONS:  - Assess patient's baseline mobility status (ambulation, transfers, stairs, etc )    - Identify cognitive and physical deficits and behaviors that affect mobility  - Identify mobility aids required to assist with transfers and/or ambulation (gait belt, sit-to-stand, lift, walker, cane, etc )  - [place] fall precautions as indicated by assessment  - Record patient progress and toleration of activity level on Mobility SBAR; progress patient to next Phase/Stage  - Instruct patient to call for assistance with activity based on assessment  - Consider rehabilitation consult to assist with strengthening/weightbearing, etc   Outcome: Progressing     Problem: DISCHARGE PLANNING  Goal: Discharge to home or other facility with appropriate resources  Description: INTERVENTIONS:  - Identify barriers to discharge w/patient and caregiver  - Arrange for needed discharge resources and transportation as appropriate  - Identify discharge learning needs (meds, wound care, etc )  - Arrange for interpretive services to assist at discharge as needed  - Refer to Case Management Department for coordinating discharge planning if the patient needs post-hospital services based on physician/advanced practitioner order or complex needs related to functional status, cognitive ability, or social support system  Outcome: Progressing     Problem: Knowledge Deficit  Goal: Patient/family/caregiver demonstrates understanding of disease process, treatment plan, medications, and discharge instructions  Description: Complete learning assessment and assess knowledge base    Interventions:  - Provide teaching at level of understanding  - Provide teaching via preferred learning methods  Outcome: Progressing

## 2020-12-25 NOTE — PROGRESS NOTES
Progress Note - Infectious Disease   Alem Perez 66 y o  male MRN: [id number]  Unit/Bed#: E5 -01 Encounter: [id number]      Impression/Plan:  1  MSSA bacteremia-with only 1 of 2 sets positive   While this could represent a contaminant, the patient had the same organism isolated in the foot wound which has been worsening, therefore I suspect this is more of a transient bacteremia   Consideration for the possibility of endocarditis although I doubt   Fortunately the patient is not systemically ill, is hemodynamically stable   He seems to be tolerating the antibiotics without difficulty   Follow-up blood cultures are negative thus far   Echocardiogram without valvular vegetation  -antibiotics as below  -follow up repeat blood cultures  -okay to place PICC line  -additional workup as needed     2  Left foot osteomyelitis-involving primarily the 5th metatarsal but also the phalanx   Infection appears to be polymicrobial although I suspect that MSSA is the primary invasive organism   Fortunately the infection seems to be fairly well localized  Patient is now status post 5th ray amputation  Marginal wound cultures growing Enterobacter but the anaerobic cultures negative   -continue cefepime for now at current dose  -discontinue Flagyl  -if path margins negative, can simplify the antibiotics to cefazolin with a plan to continue through 1/4/2021 to complete 14 days from the 1st negative blood culture   -if path margins are positive for osteomyelitis, the patient will need cefepime through 1/30/2021 to complete 6 weeks total for residual osteomyelitis versus additional resection  -recheck CBC with diff and BMP  -close podiatry follow-up  -serial exams     3   Chronic kidney disease-advanced   Renal function is waxing waning with some worsening since yesterday   Possibly a pre renal issue  Renal function is a bit better today  -dose adjusted antibiotics as needed  -volume management  -recheck BMP     4   Chronic dysuria-with positive urine culture for BCG as expected post treatment for bladder cancer   The patient is now status post biopsy with mild chronic inflammation but AFB smears negative for invasive disease   AFB Culture from the biopsies negative thus far at 4 week  -follow-up AFB cultures  -recheck AFB cultures  -follow-up with Urology and in the Infectious Disease office    Discussed the above management plan in detail with Podiatry    Infectious Disease service will see the patient again 2020  Please call if questions  Antibiotics:  Cefepime 6  Flagyl 8  Antibiotics 8  Postop day 4  Subjective:  Patient has no fever, chills, sweats; no nausea, vomiting, diarrhea; no cough, shortness of breath; no increased pain  No new symptoms  Still having some foot pain    Objective:  Vitals:  Temp:  [97 2 °F (36 2 °C)-98 °F (36 7 °C)] 97 9 °F (36 6 °C)  HR:  [60-66] 66  Resp:  [18] 18  BP: (114-156)/(71-93) 150/87  SpO2:  [98 %-100 %] 99 %  Temp (24hrs), Av 7 °F (36 5 °C), Min:97 2 °F (36 2 °C), Max:98 °F (36 7 °C)  Current: Temperature: 97 9 °F (36 6 °C)    Physical Exam:   General Appearance:  Alert, interactive, nontoxic, no acute distress  Throat: Oropharynx moist without lesions  Lungs:   Clear to auscultation bilaterally; no wheezes, rhonchi or rales; respirations unlabored   Heart:  RRR; no murmur, rub or gallop   Abdomen:   Soft, non-tender, non-distended, positive bowel sounds  Extremities: No clubbing, cyanosis  Left foot dressed with a dry dressing in place  Skin: No new rashes or lesions  No draining wounds noted         Labs, Imaging, & Other studies:   All pertinent labs and imaging studies were personally reviewed  Results from last 7 days   Lab Units 20  0550 20  0612 20  0506   WBC Thousand/uL 8 10 8 99 10 94*   HEMOGLOBIN g/dL 8 8* 9 2* 9 1*   PLATELETS Thousands/uL 166 151 142*     Results from last 7 days   Lab Units 20  0550 20  0533 12/23/20  0612  12/18/20  1705   SODIUM mmol/L 139 139 137   < > 140   POTASSIUM mmol/L 4 0 4 5 4 3   < > 5 0   CHLORIDE mmol/L 108 107 106   < > 106   CO2 mmol/L 24 24 24   < > 27   BUN mg/dL 32* 39* 41*   < > 50*   CREATININE mg/dL 2 08* 2 54* 2 19*   < > 2 47*   EGFR ml/min/1 73sq m 34 27 32   < > 28   CALCIUM mg/dL 8 4 8 7 8 6   < > 8 9   AST U/L  --   --   --   --  22   ALT U/L  --   --   --   --  61   ALK PHOS U/L  --   --   --   --  115    < > = values in this interval not displayed  Results from last 7 days   Lab Units 12/21/20  1409 12/21/20  1408 12/21/20  0643 12/21/20  0642 12/18/20  1857 12/18/20  1706 12/18/20  1653   BLOOD CULTURE   --   --  No Growth at 72 hrs  No Growth at 72 hrs   --  No Growth After 5 Days   Staphylococcus aureus*   GRAM STAIN RESULT  No Polys or Bacteria seen No Polys  No organisms seen  --   --  Rare Polys*  3+ Gram positive cocci in clusters*  --  Gram positive cocci in clusters*   WOUND CULTURE   --  1+ Growth of Staphylococcus aureus*  Few Colonies of Enterobacter aerogenes*  --   --  2+ Growth of Enterobacter aerogenes*  4+ Growth of Staphylococcus aureus*  --   --              Results from last 7 days   Lab Units 12/19/20  0520   FERRITIN ng/mL 52

## 2020-12-25 NOTE — PROGRESS NOTES
[facility] 1945[facility] 66 [facility]  [facility] MRN: [id number]    Unit/Bed#: E5 -01 Encounter: [id number]    Primary Care Provider: Tu Peacock MD   Date and time admitted to hospital: 12/18/2020 11:52 AM        * Subacute osteomyelitis of left foot [facility])  Assessment & Plan  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 fifth proximal phalangeal base     - s/p RAY RESECTION FOOT (Left) by Podiatry 12/21/2020, management per primary team  - vascular surgery following  - ID consulted after cultures are + for Enterobacter and Staph aureus  - blood cultures positive for MSSA   - will need 6 weeks based off intraop culture  - Flagyl d c by ID  - tunneled catheter placed by IR 12/24/20  - plan to discharge on long-term antibiotics    MSSA bacteremia  Assessment & Plan  One of 2 sets from 12/18/2020  Felt to be transient bacteremia in setting of osteomyelitis versus contaminant  Infectious Disease following  2D echo without evidence of vegetation  Initially on Flagyl and cefepime   Further antibiotic management per Infectious Disease  Who now suggests continuing with cefepime alone  Status post tunneled central venous catheter placement for long-term antibiotics per ID recommendations    Preoperative examination  Assessment & Plan  Was cleared by cardiology in preparation for procedure    Anemia of chronic disease  Assessment & Plan  In setting of chronic kidney disease  Hemoglobin baseline around 9-10    Acute kidney injury superimposed on chronic kidney disease ([facility] [address] )  Assessment & Plan  GARY on CKD stage IV now resolved   - creatinine peaked at 2 8  - baseline creatinine appearing 2 4  - patient appears euvolemic  - continue to hold Lasix for now  - renally dose medications  - monitor daily weights  Recent Labs     12/23/20  0612 12/24/20  0533 12/25/20  0550   BUN 41* 39* 32*   CREATININE 2 19* 2 54* 2 08*   EGFR 32 27 34   - cr continues to improve    Hyperlipidemia  Assessment & Plan  Diet controlled    Hypertension with renal disease  Assessment & Plan  Continue metoprolol succinate 100 mg daily and imdur  Lasix on hold     CAD (coronary artery disease)  Assessment & Plan  Continue aspirin, metoprolol and Imdur    VTE Pharmacologic Prophylaxis:   Pharmacologic: Heparin  Mechanical VTE Prophylaxis in Place: Yes    Discharge Plan: per primary team podiatry    Discussions with Specialists or Other Care Team Provider: Dr Vera Espinoza, nursing  Education and Discussions with Family / Patient: patient    Time Spent for Care: 15 minutes  More than 50% of total time spent on counseling and coordination of care as described above  Current Length of Stay: 7 day(s)  Current Patient Status: Inpatient   Code Status: Level 1 - Full Code    Subjective:   Resting in bed  Doing well  Feels good and was able to walk around  No other complaints  Objective:     Vitals:   Temp (24hrs), Av 7 °F (36 5 °C), Min:97 2 °F (36 2 °C), Max:98 °F (36 7 °C)    Temp:  [97 2 °F (36 2 °C)-98 °F (36 7 °C)] 97 8 °F (36 6 °C)  HR:  [57-66] 57  Resp:  [18] 18  BP: (121-156)/(71-93) 133/82  SpO2:  [98 %-100 %] 99 %  Body mass index is 28 31 kg/m²  Input and Output Summary (last 24 hours): Intake/Output Summary (Last 24 hours) at 2020 1346  Last data filed at 2020 7822  Gross per 24 hour   Intake 2400 ml   Output 2020 ml   Net 380 ml       Physical Exam:     Physical Exam  Vitals signs and nursing note reviewed  Constitutional:       Appearance: Normal appearance  He is normal weight  He is not ill-appearing or diaphoretic  Eyes:      General: No scleral icterus  Cardiovascular:      Rate and Rhythm: Normal rate and regular rhythm  Pulmonary:      Effort: Pulmonary effort is normal  No respiratory distress  Breath sounds: Normal breath sounds  No stridor  No wheezing or rhonchi     Abdominal:      General: Bowel sounds are normal  There is no distension  Palpations: Abdomen is soft  There is no mass  Tenderness: There is no abdominal tenderness  Hernia: No hernia is present  Musculoskeletal:         General: No swelling  Skin:     Comments: Left foot gauze wrapped   Neurological:      Mental Status: He is oriented to person, place, and time  Mental status is at baseline  Psychiatric:         Mood and Affect: Mood normal          Behavior: Behavior normal          Additional Data:     Labs:    Results from last 7 days   Lab Units 12/25/20  0550   WBC Thousand/uL 8 10   HEMOGLOBIN g/dL 8 8*   HEMATOCRIT % 27 5*   PLATELETS Thousands/uL 166   NEUTROS PCT % 66   LYMPHS PCT % 19   MONOS PCT % 11   EOS PCT % 2     Results from last 7 days   Lab Units 12/25/20  0550  12/18/20  1705   POTASSIUM mmol/L 4 0   < > 5 0   CHLORIDE mmol/L 108   < > 106   CO2 mmol/L 24   < > 27   BUN mg/dL 32*   < > 50*   CREATININE mg/dL 2 08*   < > 2 47*   CALCIUM mg/dL 8 4   < > 8 9   ALK PHOS U/L  --   --  115   ALT U/L  --   --  61   AST U/L  --   --  22    < > = values in this interval not displayed  * I Have Reviewed All Lab Data Listed Above  * Additional Pertinent Lab Tests Reviewed: [facility]    Imaging:    Imaging Reports Reviewed Today Include: arterial duplex, left foot xray  Imaging Personally Reviewed by Myself Includes:      Recent Cultures (last 7 days):     Results from last 7 days   Lab Units 12/21/20  1409 12/21/20  1408 12/21/20  0643 12/21/20  0642 12/18/20  1857 12/18/20  1706 12/18/20  1653   BLOOD CULTURE   --   --  No Growth After 4 Days  No Growth After 4 Days  --  No Growth After 5 Days   Staphylococcus aureus*   GRAM STAIN RESULT  No Polys or Bacteria seen No Polys  No organisms seen  --   --  Rare Polys*  3+ Gram positive cocci in clusters*  --  Gram positive cocci in clusters*   WOUND CULTURE   --  1+ Growth of Staphylococcus aureus*  Few Colonies of Enterobacter aerogenes*  --   -- 2+ Growth of Enterobacter aerogenes*  4+ Growth of Staphylococcus aureus*  --   --        Last 24 Hours Medication List:   Current Facility-Administered Medications   Medication Dose Route Frequency Provider Last Rate    acetaminophen  975 mg Oral Q6H [facility] Gonzalez You, DPM      ALPRAZolam  0 25 mg Oral HS PRN Gonzalez You, DPM      aspirin  81 mg Oral Daily Gonzalez You, DPM      cefepime  2,000 mg Intravenous Q12H Gonzalez You, DPM 2,000 mg (12/25/20 1142)    fluticasone  1 spray Each Nare Daily Gonzalez You, DPM      gabapentin  600 mg Oral Daily Gonzalez You, DPM      heparin (porcine)  5,000 Units Subcutaneous Q12H [facility] Gonzalez You, DPM      insulin glargine  20 Units Subcutaneous HS Zarina Campos MD      insulin lispro  1-5 Units Subcutaneous 4x Daily (AC & HS) Gonzalez You, DPM      isosorbide mononitrate  90 mg Oral Daily Gonzalez You, DPM      latanoprost  1 drop Both Eyes HS Gonzalez You, DPM      melatonin  3 mg Oral HS PRN Gonzalez You, DPM      metoprolol succinate  100 mg Oral Daily Gonzalez You, DPM      metroNIDAZOLE  500 mg Oral [address], DPM      ondansetron  4 mg Intravenous Q6H PRN Gonzalez You, DPM      oxyCODONE  5 mg Oral Q6H PRN Gonzalez You, DPM      pantoprazole  40 mg Oral Early Morning Gonzalez You, DPM      polyethylene glycol  17 g Oral Daily Gonzalez You, DPM      senna-docusate sodium  1 tablet Oral HS Gonzalez You, DPM      sertraline  50 mg Oral Daily Gonzalez You, DPM      sodium chloride  50 mL/hr Intravenous Continuous Gonzalez You, DPM 50 mL/hr (12/25/20 0143)    tamsulosin  0 4 mg Oral HS Gonzalez You, DPM      traMADol  50 mg Oral Q12H PRN Gonzalez You, DPM          Today, Patient Was Seen By: Yesika Herzog PA-C    ** Please Note: This note has been constructed using a voice recognition system   **

## 2020-12-25 NOTE — ASSESSMENT & PLAN NOTE
GARY on CKD stage IV now resolved   - creatinine peaked at 2 8  - baseline creatinine appearing 2 4  - patient appears euvolemic  - continue to hold Lasix for now  - renally dose medications  - monitor daily weights  Recent Labs     12/23/20  0612 12/24/20  0533 12/25/20  0550   BUN 41* 39* 32*   CREATININE 2 19* 2 54* 2 08*   EGFR 32 27 34   - cr continues to improve

## 2020-12-26 LAB
ANION GAP SERPL CALCULATED.3IONS-SCNC: 9 MMOL/L (ref 4–13)
BACTERIA BLD CULT: NORMAL
BACTERIA BLD CULT: NORMAL
BUN SERPL-MCNC: 28 MG/DL (ref 5–25)
CALCIUM SERPL-MCNC: 8.6 MG/DL (ref 8.3–10.1)
CHLORIDE SERPL-SCNC: 105 MMOL/L (ref 100–108)
CO2 SERPL-SCNC: 22 MMOL/L (ref 21–32)
CREAT SERPL-MCNC: 2.14 MG/DL (ref 0.6–1.3)
ERYTHROCYTE [DISTWIDTH] IN BLOOD BY AUTOMATED COUNT: 13.4 % (ref 11.6–15.1)
GFR SERPL CREATININE-BSD FRML MDRD: 33 ML/MIN/1.73SQ M
GLUCOSE SERPL-MCNC: 109 MG/DL (ref 65–140)
GLUCOSE SERPL-MCNC: 122 MG/DL (ref 65–140)
GLUCOSE SERPL-MCNC: 233 MG/DL (ref 65–140)
GLUCOSE SERPL-MCNC: 80 MG/DL (ref 65–140)
GLUCOSE SERPL-MCNC: 95 MG/DL (ref 65–140)
HCT VFR BLD AUTO: 28.6 % (ref 36.5–49.3)
HGB BLD-MCNC: 9.2 G/DL (ref 12–17)
MCH RBC QN AUTO: 30.2 PG (ref 26.8–34.3)
MCHC RBC AUTO-ENTMCNC: 32.2 G/DL (ref 31.4–37.4)
MCV RBC AUTO: 94 FL (ref 82–98)
PLATELET # BLD AUTO: 184 THOUSANDS/UL (ref 149–390)
PMV BLD AUTO: 10.2 FL (ref 8.9–12.7)
POTASSIUM SERPL-SCNC: 4 MMOL/L (ref 3.5–5.3)
RBC # BLD AUTO: 3.05 MILLION/UL (ref 3.88–5.62)
SODIUM SERPL-SCNC: 136 MMOL/L (ref 136–145)
WBC # BLD AUTO: 8.22 THOUSAND/UL (ref 4.31–10.16)

## 2020-12-26 PROCEDURE — 85027 COMPLETE CBC AUTOMATED: CPT | Performed by: PHYSICIAN ASSISTANT

## 2020-12-26 PROCEDURE — 82948 REAGENT STRIP/BLOOD GLUCOSE: CPT

## 2020-12-26 PROCEDURE — 80048 BASIC METABOLIC PNL TOTAL CA: CPT | Performed by: PHYSICIAN ASSISTANT

## 2020-12-26 PROCEDURE — 99232 SBSQ HOSP IP/OBS MODERATE 35: CPT | Performed by: PHYSICIAN ASSISTANT

## 2020-12-26 RX ORDER — INSULIN GLARGINE 100 [IU]/ML
10 INJECTION, SOLUTION SUBCUTANEOUS
Status: DISCONTINUED | OUTPATIENT
Start: 2020-12-26 | End: 2020-12-27

## 2020-12-26 RX ADMIN — OXYCODONE HYDROCHLORIDE 5 MG: 5 TABLET ORAL at 06:06

## 2020-12-26 RX ADMIN — TAMSULOSIN HYDROCHLORIDE 0.4 MG: 0.4 CAPSULE ORAL at 20:10

## 2020-12-26 RX ADMIN — CEFEPIME HYDROCHLORIDE 2000 MG: 2 INJECTION, POWDER, FOR SOLUTION INTRAVENOUS at 23:43

## 2020-12-26 RX ADMIN — PANTOPRAZOLE SODIUM 40 MG: 40 TABLET, DELAYED RELEASE ORAL at 05:38

## 2020-12-26 RX ADMIN — LATANOPROST 1 DROP: 50 SOLUTION OPHTHALMIC at 20:10

## 2020-12-26 RX ADMIN — INSULIN GLARGINE 10 UNITS: 100 INJECTION, SOLUTION SUBCUTANEOUS at 21:32

## 2020-12-26 RX ADMIN — TRAMADOL HYDROCHLORIDE 50 MG: 50 TABLET, FILM COATED ORAL at 09:26

## 2020-12-26 RX ADMIN — ACETAMINOPHEN 975 MG: 325 TABLET ORAL at 12:27

## 2020-12-26 RX ADMIN — HEPARIN SODIUM 5000 UNITS: 5000 INJECTION INTRAVENOUS; SUBCUTANEOUS at 20:09

## 2020-12-26 RX ADMIN — LOPERAMIDE HYDROCHLORIDE 2 MG: 2 CAPSULE ORAL at 05:38

## 2020-12-26 RX ADMIN — METOPROLOL SUCCINATE 100 MG: 50 TABLET, EXTENDED RELEASE ORAL at 09:17

## 2020-12-26 RX ADMIN — OXYCODONE HYDROCHLORIDE 5 MG: 5 TABLET ORAL at 21:32

## 2020-12-26 RX ADMIN — CEFEPIME HYDROCHLORIDE 2000 MG: 2 INJECTION, POWDER, FOR SOLUTION INTRAVENOUS at 12:27

## 2020-12-26 RX ADMIN — INSULIN LISPRO 2 UNITS: 100 INJECTION, SOLUTION INTRAVENOUS; SUBCUTANEOUS at 16:38

## 2020-12-26 RX ADMIN — ASPIRIN 81 MG CHEWABLE TABLET 81 MG: 81 TABLET CHEWABLE at 09:16

## 2020-12-26 RX ADMIN — SERTRALINE HYDROCHLORIDE 50 MG: 50 TABLET ORAL at 09:16

## 2020-12-26 RX ADMIN — GABAPENTIN 600 MG: 300 CAPSULE ORAL at 09:16

## 2020-12-26 RX ADMIN — ACETAMINOPHEN 975 MG: 325 TABLET ORAL at 23:42

## 2020-12-26 RX ADMIN — SODIUM CHLORIDE 50 ML/HR: 0.9 INJECTION, SOLUTION INTRAVENOUS at 05:44

## 2020-12-26 RX ADMIN — ACETAMINOPHEN 975 MG: 325 TABLET ORAL at 05:38

## 2020-12-26 RX ADMIN — LOPERAMIDE HYDROCHLORIDE 2 MG: 2 CAPSULE ORAL at 09:17

## 2020-12-26 RX ADMIN — LOPERAMIDE HYDROCHLORIDE 2 MG: 2 CAPSULE ORAL at 20:09

## 2020-12-26 RX ADMIN — HEPARIN SODIUM 5000 UNITS: 5000 INJECTION INTRAVENOUS; SUBCUTANEOUS at 09:17

## 2020-12-26 RX ADMIN — ISOSORBIDE MONONITRATE 90 MG: 30 TABLET, EXTENDED RELEASE ORAL at 09:17

## 2020-12-26 NOTE — PLAN OF CARE
Problem: Potential for Falls  Goal: Patient will remain free of falls  Description: INTERVENTIONS:  - Assess patient frequently for physical needs  -  Identify cognitive and physical deficits and behaviors that affect risk of falls    -  [place] fall precautions as indicated by assessment   - Educate patient/family on patient safety including physical limitations  - Instruct patient to call for assistance with activity based on assessment  - Modify environment to reduce risk of injury  - Consider OT/PT consult to assist with strengthening/mobility  Outcome: Progressing     Problem: MUSCULOSKELETAL - ADULT  Goal: Maintain or return mobility to safest level of function  Description: INTERVENTIONS:  - Assess patient's ability to carry out ADLs; assess patient's baseline for ADL function and identify physical deficits which impact ability to perform ADLs (bathing, care of mouth/teeth, toileting, grooming, dressing, etc )  - Assess/evaluate cause of self-care deficits   - Assess range of motion  - Assess patient's mobility  - Assess patient's need for assistive devices and provide as appropriate  - Encourage maximum independence but intervene and supervise when necessary  - Involve family in performance of ADLs  - Assess for home care needs following discharge   - Consider OT consult to assist with ADL evaluation and planning for discharge  - Provide patient education as appropriate  Outcome: Progressing  Goal: Maintain proper alignment of affected body part  Description: INTERVENTIONS:  - Support, maintain and protect limb and body alignment  - Provide patient/ family with appropriate education  Outcome: Progressing     Problem: PAIN - ADULT  Goal: Verbalizes/displays adequate comfort level or baseline comfort level  Description: Interventions:  - Encourage patient to monitor pain and request assistance  - Assess pain using appropriate pain scale  - Administer analgesics based on type and severity of pain and evaluate response  - Implement non-pharmacological measures as appropriate and evaluate response  - Consider cultural and social influences on pain and pain management  - Notify physician/advanced practitioner if interventions unsuccessful or patient reports new pain  Outcome: Progressing     Problem: INFECTION - ADULT  Goal: Absence or prevention of progression during hospitalization  Description: INTERVENTIONS:  - Assess and monitor for signs and symptoms of infection  - Monitor lab/diagnostic results  - Monitor all insertion sites, i e  indwelling lines, tubes, and drains  - Monitor endotracheal if appropriate and nasal secretions for changes in amount and color  - [place] appropriate cooling/warming therapies per order  - Administer medications as ordered  - Instruct and encourage patient and family to use good hand hygiene technique  - Identify and instruct in appropriate isolation precautions for identified infection/condition  Outcome: Progressing  Goal: Absence of fever/infection during neutropenic period  Description: INTERVENTIONS:  - Monitor WBC    Outcome: Progressing     Problem: SAFETY ADULT  Goal: Patient will remain free of falls  Description: INTERVENTIONS:  - Assess patient frequently for physical needs  -  Identify cognitive and physical deficits and behaviors that affect risk of falls    -  [place] fall precautions as indicated by assessment   - Educate patient/family on patient safety including physical limitations  - Instruct patient to call for assistance with activity based on assessment  - Modify environment to reduce risk of injury  - Consider OT/PT consult to assist with strengthening/mobility  Outcome: Progressing  Goal: Maintain or return to baseline ADL function  Description: INTERVENTIONS:  -  Assess patient's ability to carry out ADLs; assess patient's baseline for ADL function and identify physical deficits which impact ability to perform ADLs (bathing, care of mouth/teeth, toileting, grooming, dressing, etc )  - Assess/evaluate cause of self-care deficits   - Assess range of motion  - Assess patient's mobility; develop plan if impaired  - Assess patient's need for assistive devices and provide as appropriate  - Encourage maximum independence but intervene and supervise when necessary  - Involve family in performance of ADLs  - Assess for home care needs following discharge   - Consider OT consult to assist with ADL evaluation and planning for discharge  - Provide patient education as appropriate  Outcome: Progressing  Goal: Maintain or return mobility status to optimal level  Description: INTERVENTIONS:  - Assess patient's baseline mobility status (ambulation, transfers, stairs, etc )    - Identify cognitive and physical deficits and behaviors that affect mobility  - Identify mobility aids required to assist with transfers and/or ambulation (gait belt, sit-to-stand, lift, walker, cane, etc )  - [place] fall precautions as indicated by assessment  - Record patient progress and toleration of activity level on Mobility SBAR; progress patient to next Phase/Stage  - Instruct patient to call for assistance with activity based on assessment  - Consider rehabilitation consult to assist with strengthening/weightbearing, etc   Outcome: Progressing     Problem: DISCHARGE PLANNING  Goal: Discharge to home or other facility with appropriate resources  Description: INTERVENTIONS:  - Identify barriers to discharge w/patient and caregiver  - Arrange for needed discharge resources and transportation as appropriate  - Identify discharge learning needs (meds, wound care, etc )  - Arrange for interpretive services to assist at discharge as needed  - Refer to Case Management Department for coordinating discharge planning if the patient needs post-hospital services based on physician/advanced practitioner order or complex needs related to functional status, cognitive ability, or social support system  Outcome: Progressing     Problem: Knowledge Deficit  Goal: Patient/family/caregiver demonstrates understanding of disease process, treatment plan, medications, and discharge instructions  Description: Complete learning assessment and assess knowledge base    Interventions:  - Provide teaching at level of understanding  - Provide teaching via preferred learning methods  Outcome: Progressing

## 2020-12-26 NOTE — ASSESSMENT & PLAN NOTE
GARY on CKD stage IV now resolved   - creatinine peaked at 2 8  - baseline creatinine appearing 2 4  - patient appears euvolemic  - continue to hold Lasix for now  - renally dose medications  - monitor daily weights  - cr continues to improve - currently 2 14

## 2020-12-26 NOTE — PROGRESS NOTES
Progress Note - Paris Cabrales 1945, 66 y o  male MRN: [id number]    Unit/Bed#: E5 -01 Encounter: [id number]    Primary Care Provider: Luisana Govea MD   Date and time admitted to hospital: 12/18/2020 11:52 AM        * Subacute osteomyelitis of left foot [facility])  Assessment & Plan  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 fifth proximal phalangeal base     - s/p RAY RESECTION FOOT (Left) by Podiatry 12/21/2020, management per primary team  - ID consulted after cultures are + for Enterobacter and Staph aureus  - blood cultures positive for MSSA from 12/18/20  - will need 6 weeks based off intraop culture  - Flagyl d c by ID  - tunneled catheter placed by IR 12/24/20  - plan to discharge on long-term antibiotics  - awaiting rehab    MSSA bacteremia  Assessment & Plan  One of 2 sets from 12/18/2020  Felt to be transient bacteremia in setting of osteomyelitis versus contaminant  Infectious Disease following  2D echo without evidence of vegetation  Initially on Flagyl and cefepime   Further antibiotic management per Infectious Disease  Who now suggests continuing with cefepime alone  Status post tunneled central venous catheter placement for long-term antibiotics per ID recommendations    Preoperative examination  Assessment & Plan  Was cleared by cardiology in preparation for procedure    Anemia of chronic disease  Assessment & Plan  In setting of chronic kidney disease  Hemoglobin baseline around 9-10    Acute kidney injury superimposed on chronic kidney disease ([facility] [address] )  Assessment & Plan  GARY on CKD stage IV now resolved   - creatinine peaked at 2 8  - baseline creatinine appearing 2 4  - patient appears euvolemic  - continue to hold Lasix for now  - renally dose medications  - monitor daily weights  - cr continues to improve - currently 2 14    Type 2 diabetes mellitus, with long-term current use of insulin [facility])  Assessment & Plan  Lab Results Component Value Date    HGBA1C 7 4 (H) 2020     Recent Labs     20  1553 20  2042 20  0751 20  1127   POCGLU 101 179* 80 109   Acceptable control with hemoglobin A1c of 7 4  Home Regimen is Lantus 27 units HS and 10-12 units TID AC  Will reduce Lantus to 10 units before bed given lower sugars  Continue sliding scale    Hyperlipidemia  Assessment & Plan  Diet controlled    Hypertension with renal disease  Assessment & Plan  Continue metoprolol succinate 100 mg daily and imdur  Lasix on hold   Consider resuming Lasix within the next few days  Fluid stopped today    CAD (coronary artery disease)  Assessment & Plan  Continue aspirin, metoprolol and Imdur        VTE Pharmacologic Prophylaxis:   Pharmacologic: Heparin  Mechanical VTE Prophylaxis in Place: Yes    Discharge Plan:  Per primary team-Podiatry    Discussions with Specialists or Other Care Team Provider:  Nursing    Education and Discussions with Family / Patient:  Patient    Time Spent for Care: 20 minutes  More than 50% of total time spent on counseling and coordination of care as described above  Current Length of Stay: 8 day(s)  Current Patient Status: Inpatient   Code Status: Level 1 - Full Code    Subjective:   Resting on edge of bed eating lunch  He feels well today  Ambulated without difficulties  [facility] waiting to speak with Case Management  Had 2 episodes of loose stool this morning  Tolerating diet  Objective:     Vitals:   Temp (24hrs), Av °F (36 7 °C), Min:97 6 °F (36 4 °C), Max:98 4 °F (36 9 °C)    Temp:  [97 6 °F (36 4 °C)-98 4 °F (36 9 °C)] 97 6 °F (36 4 °C)  HR:  [61-70] 63  Resp:  [18] 18  BP: (112-149)/(63-90) 149/90  SpO2:  [96 %-100 %] 99 %  Body mass index is 28 18 kg/m²  Input and Output Summary (last 24 hours):        Intake/Output Summary (Last 24 hours) at 2020 1432  Last data filed at 2020 1209  Gross per 24 hour   Intake 321 ml   Output 600 ml   Net -279 ml       Physical Exam:     Physical Exam  Vitals signs and nursing note reviewed  Constitutional:       Appearance: Normal appearance  He is normal weight  HENT:      Head: Atraumatic  Eyes:      General: No scleral icterus  Cardiovascular:      Rate and Rhythm: Normal rate and regular rhythm  Pulmonary:      Effort: Pulmonary effort is normal  No respiratory distress  Breath sounds: Normal breath sounds  No stridor  No wheezing or rhonchi  Abdominal:      General: Bowel sounds are normal  There is no distension  Palpations: Abdomen is soft  There is no mass  Tenderness: There is no abdominal tenderness  Hernia: No hernia is present  Musculoskeletal:         General: No swelling  Skin:     General: Skin is warm and dry  Comments: Left foot gauze wrapped in Ace wrapped   Neurological:      Mental Status: He is oriented to person, place, and time  Psychiatric:         Mood and Affect: Mood normal          Behavior: Behavior normal          Additional Data:     Labs:    Results from last 7 days   Lab Units 12/26/20  0538 12/25/20  0550   WBC Thousand/uL 8 22 8 10   HEMOGLOBIN g/dL 9 2* 8 8*   HEMATOCRIT % 28 6* 27 5*   PLATELETS Thousands/uL 184 166   NEUTROS PCT %  --  66   LYMPHS PCT %  --  19   MONOS PCT %  --  11   EOS PCT %  --  2     Results from last 7 days   Lab Units 12/26/20  0538   POTASSIUM mmol/L 4 0   CHLORIDE mmol/L 105   CO2 mmol/L 22   BUN mg/dL 28*   CREATININE mg/dL 2 14*   CALCIUM mg/dL 8 6           * I Have Reviewed All Lab Data Listed Above  * Additional Pertinent Lab Tests Reviewed: All Labs Within Last 24 Hours Reviewed    Imaging:    Imaging Reports Reviewed Today Include:   Imaging Personally Reviewed by Myself Includes:      Recent Cultures (last 7 days):     Results from last 7 days   Lab Units 12/21/20  1409 12/21/20  1408 12/21/20  0643 12/21/20  8528   BLOOD CULTURE   --   --  No Growth After 5 Days  No Growth After 5 Days     GRAM STAIN RESULT No Polys or Bacteria seen No Polys  No organisms seen  --   --    WOUND CULTURE   --  1+ Growth of Staphylococcus aureus*  Few Colonies of Enterobacter aerogenes*  --   --        Last 24 Hours Medication List:   Current Facility-Administered Medications   Medication Dose Route Frequency Provider Last Rate    acetaminophen  975 mg Oral Q6H [address] Helene Alcala DPM      ALPRAZolam  0 25 mg Oral HS PRN Helene Alcala DPM      aspirin  81 mg Oral Daily Helene Alcala DPM      cefepime  2,000 mg Intravenous Q12H Helene Alcala DPM 2,000 mg (12/26/20 1227)    fluticasone  1 spray Each Nare Daily Helene Alcala DPM      gabapentin  600 mg Oral Daily Helene Alcala DPM      heparin (porcine)  5,000 Units Subcutaneous Q12H [address] Helene Alcala DPM      insulin glargine  10 Units Subcutaneous HS Lynda Ang PA-C      insulin lispro  1-5 Units Subcutaneous 4x Daily (AC & HS) Helene Alcala DPM      isosorbide mononitrate  90 mg Oral Daily Helene Alcala DPM      latanoprost  1 drop Both Eyes HS Helene Alcala DPM      loperamide  2 mg Oral TID PRN [facility], BO      melatonin  3 mg Oral HS PRN Helene Alcala DPM      metoprolol succinate  100 mg Oral Daily Helene Alcala DPM      ondansetron  4 mg Intravenous Q6H PRN Helene Alcala DPM      oxyCODONE  5 mg Oral Q6H PRN Helene Alcala DPM      pantoprazole  40 mg Oral Early Morning Helene Alcala DPM      polyethylene glycol  17 g Oral Daily Helene Alcala DPM      senna-docusate sodium  1 tablet Oral HS Helene Alcala DPM      sertraline  50 mg Oral Daily Helene Alcala DPM      tamsulosin  0 4 mg Oral HS Helene Alcala DPM      traMADol  50 mg Oral Q12H PRN Helene Alcala DPM          Today, Patient Was Seen By: Jc Kruger PA-C    ** Please Note: This note has been constructed using a voice recognition system   **

## 2020-12-26 NOTE — ASSESSMENT & PLAN NOTE
Lab Results   Component Value Date    HGBA1C 7 4 (H) 12/18/2020     Recent Labs     12/25/20  1553 12/25/20  2042 12/26/20  0751 12/26/20  1127   POCGLU 101 179* 80 109   Acceptable control with hemoglobin A1c of 7 4  Home Regimen is Lantus 27 units HS and 10-12 units TID AC  Will reduce Lantus to 10 units before bed given lower sugars     Continue sliding scale

## 2020-12-26 NOTE — ASSESSMENT & PLAN NOTE
Continue metoprolol succinate 100 mg daily and imdur  Lasix on hold   Consider resuming Lasix within the next few days  Fluid stopped today

## 2020-12-26 NOTE — PROGRESS NOTES
Podiatry - Progress Note  Patient: Alicia Lowry 66 y o  male   MRN: [id number]  PCP: Suman Serrano MD  Unit/Bed#: E5 -85 Encounter: [id number]  Date Of Visit: 20    ASSESSMENT:    Alicia Lowry is a 66 y o  male with:    1  Left foot osteomyelitis of 5th metatarsal, clean cultures growth in broth only  2  Left foot cellulitis  3  MSSA bacteremia   4  Insulin Dependent Diabetes mellitus  5  Coronary arterial disease  6  Peripheral arterial disease  7  Hypertension    PLAN:    · Await final intra op pathology  Patient will need 6 weeks Cefepime, IJ line in place  · Incision with some serous drainage, minimal erythema at this time  · Elevation on green foam wedges or pillows when non-ambulatory  · CM on board for dispo planning  · Appreciate consulting services for recommendations and management  Antibiotics started: Cefepime  Pharmacologic VTE Prophylaxis: Heparin   Mechanical VTE Prophylaxis: sequential compression device   Weightbearing status:  Nonweightbearing to left foot  Disposition:  Patient requires continued stay for placement and final pathology  SUBJECTIVE:     The patient was seen, evaluated, and assessed at bedside today  The patient was awake, alert, and in no acute distress  No acute events overnight  The patient reports   Patient denies N/V/F/chills/SOB/CP  OBJECTIVE:     Vitals:   /90 (BP Location: Right arm)   Pulse 63   Temp 97 6 °F (36 4 °C) (Temporal)   Resp 18   Wt 105 kg (231 lb 7 7 oz)   SpO2 99%   BMI 28 18 kg/m²     Temp (24hrs), Av 9 °F (36 6 °C), Min:97 6 °F (36 4 °C), Max:98 4 °F (36 9 °C)      Physical Exam:     General: Alert, cooperative and no distress  Lungs: Non labored breathing  Abdomen: Soft, non-tender  Lower extremity exam:  Cardiovascular status at baseline  Neurological status at baseline  Musculoskeletal status at baseline  No calf tenderness noted    Incision appears well-coapting with minimal serous drainage and mild maceration of the central aspect of the incision  Without signs of active infection: no purulence, no malodor, no ascending erythema, no crepitus, no fluctuance  Clinical Images 12/26/20:        Additional Data:     Labs:    Results from last 7 days   Lab Units 12/26/20  0538 12/25/20  0550   WBC Thousand/uL 8 22 8 10   HEMOGLOBIN g/dL 9 2* 8 8*   HEMATOCRIT % 28 6* 27 5*   PLATELETS Thousands/uL 184 166   NEUTROS PCT %  --  66   LYMPHS PCT %  --  19   MONOS PCT %  --  11   EOS PCT %  --  2     Results from last 7 days   Lab Units 12/26/20  0538   POTASSIUM mmol/L 4 0   CHLORIDE mmol/L 105   CO2 mmol/L 22   BUN mg/dL 28*   CREATININE mg/dL 2 14*   CALCIUM mg/dL 8 6           * I Have Reviewed All Lab Data Listed Above  Recent Cultures (last 7 days):     Results from last 7 days   Lab Units 12/21/20  1409 12/21/20  1408 12/21/20  0643 12/21/20  0628   BLOOD CULTURE   --   --  No Growth After 4 Days  No Growth After 4 Days  GRAM STAIN RESULT  No Polys or Bacteria seen No Polys  No organisms seen  --   --    WOUND CULTURE   --  1+ Growth of Staphylococcus aureus*  Few Colonies of Enterobacter aerogenes*  --   --      Results from last 7 days   Lab Units 12/21/20  1409 12/21/20  1408   ANAEROBIC CULTURE  No growth No anaerobes isolated       Imaging: I have personally reviewed pertinent films in PACS  EKG, Pathology, and Other Studies: I have personally reviewed pertinent reports  ** Please Note: Portions of the record may have been created with voice recognition software  Occasional wrong word or "sound a like" substitutions may have occurred due to the inherent limitations of voice recognition software  Read the chart carefully and recognize, using context, where substitutions have occurred   **

## 2020-12-27 LAB
GLUCOSE SERPL-MCNC: 109 MG/DL (ref 65–140)
GLUCOSE SERPL-MCNC: 129 MG/DL (ref 65–140)
GLUCOSE SERPL-MCNC: 174 MG/DL (ref 65–140)
GLUCOSE SERPL-MCNC: 95 MG/DL (ref 65–140)

## 2020-12-27 PROCEDURE — 82948 REAGENT STRIP/BLOOD GLUCOSE: CPT

## 2020-12-27 PROCEDURE — 97530 THERAPEUTIC ACTIVITIES: CPT

## 2020-12-27 PROCEDURE — 99232 SBSQ HOSP IP/OBS MODERATE 35: CPT | Performed by: PHYSICIAN ASSISTANT

## 2020-12-27 RX ORDER — INSULIN GLARGINE 100 [IU]/ML
5 INJECTION, SOLUTION SUBCUTANEOUS
Status: DISCONTINUED | OUTPATIENT
Start: 2020-12-27 | End: 2020-12-29

## 2020-12-27 RX ADMIN — ACETAMINOPHEN 975 MG: 325 TABLET ORAL at 06:20

## 2020-12-27 RX ADMIN — SERTRALINE HYDROCHLORIDE 50 MG: 50 TABLET ORAL at 10:04

## 2020-12-27 RX ADMIN — LATANOPROST 1 DROP: 50 SOLUTION OPHTHALMIC at 21:45

## 2020-12-27 RX ADMIN — ISOSORBIDE MONONITRATE 90 MG: 30 TABLET, EXTENDED RELEASE ORAL at 10:02

## 2020-12-27 RX ADMIN — INSULIN LISPRO 1 UNITS: 100 INJECTION, SOLUTION INTRAVENOUS; SUBCUTANEOUS at 21:39

## 2020-12-27 RX ADMIN — OXYCODONE HYDROCHLORIDE 5 MG: 5 TABLET ORAL at 23:44

## 2020-12-27 RX ADMIN — TRAMADOL HYDROCHLORIDE 50 MG: 50 TABLET, FILM COATED ORAL at 00:35

## 2020-12-27 RX ADMIN — TAMSULOSIN HYDROCHLORIDE 0.4 MG: 0.4 CAPSULE ORAL at 21:39

## 2020-12-27 RX ADMIN — ACETAMINOPHEN 975 MG: 325 TABLET ORAL at 18:06

## 2020-12-27 RX ADMIN — FLUTICASONE PROPIONATE 1 SPRAY: 50 SPRAY, METERED NASAL at 10:02

## 2020-12-27 RX ADMIN — CEFEPIME HYDROCHLORIDE 2000 MG: 2 INJECTION, POWDER, FOR SOLUTION INTRAVENOUS at 23:32

## 2020-12-27 RX ADMIN — ACETAMINOPHEN 975 MG: 325 TABLET ORAL at 13:36

## 2020-12-27 RX ADMIN — CEFEPIME HYDROCHLORIDE 2000 MG: 2 INJECTION, POWDER, FOR SOLUTION INTRAVENOUS at 13:36

## 2020-12-27 RX ADMIN — ASPIRIN 81 MG CHEWABLE TABLET 81 MG: 81 TABLET CHEWABLE at 10:02

## 2020-12-27 RX ADMIN — PANTOPRAZOLE SODIUM 40 MG: 40 TABLET, DELAYED RELEASE ORAL at 06:20

## 2020-12-27 RX ADMIN — HEPARIN SODIUM 5000 UNITS: 5000 INJECTION INTRAVENOUS; SUBCUTANEOUS at 10:02

## 2020-12-27 RX ADMIN — GABAPENTIN 600 MG: 300 CAPSULE ORAL at 10:02

## 2020-12-27 RX ADMIN — INSULIN GLARGINE 5 UNITS: 100 INJECTION, SOLUTION SUBCUTANEOUS at 21:39

## 2020-12-27 RX ADMIN — LOPERAMIDE HYDROCHLORIDE 2 MG: 2 CAPSULE ORAL at 06:20

## 2020-12-27 RX ADMIN — ALPRAZOLAM 0.25 MG: 0.25 TABLET ORAL at 23:34

## 2020-12-27 RX ADMIN — METOPROLOL SUCCINATE 100 MG: 50 TABLET, EXTENDED RELEASE ORAL at 10:02

## 2020-12-27 RX ADMIN — HEPARIN SODIUM 5000 UNITS: 5000 INJECTION INTRAVENOUS; SUBCUTANEOUS at 21:38

## 2020-12-27 NOTE — ASSESSMENT & PLAN NOTE
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 fifth proximal phalangeal base     - s/p RAY RESECTION FOOT (Left) by Podiatry 12/21/2020, management per primary team  - ID consulted after cultures are + for Enterobacter and Staph aureus  - blood cultures positive for MSSA from 12/18/20  - will need 6 weeks based off intraop culture  - Flagyl d c by ID  - tunneled catheter placed by IR 12/24/20  - plan to discharge on long-term antibiotics  - awaiting rehab

## 2020-12-27 NOTE — PHYSICAL THERAPY NOTE
Physical Therapy Progress Note     12/27/20 1134   PT Last Visit   PT Visit Date 12/27/20   Note Type   Note Type Treatment   Pain Assessment   Pain Assessment Tool 0-10   Pain Score 6   Pain Location/Orientation Orientation: Left; Location: Foot   Hospital Pain Intervention(s) Medication (See MAR); Repositioned; Ambulation/increased activity   Restrictions/Precautions   Weight Bearing Precautions Per Order Yes   LLE Weight Bearing Per Order NWB   Other Precautions Pain; Fall Risk;WBS   General   Chart Reviewed Yes   Response to Previous Treatment Patient with no complaints from previous session  Family/Caregiver Present No   Cognition   Overall Cognitive Status WFL   Arousal/Participation Alert; Responsive; Cooperative   Attention Within functional limits   Orientation Level Oriented X4   Memory Within functional limits   Following Commands Follows multistep commands without difficulty   Transfers   Sit to Stand 5  Supervision   Additional items Increased time required;Verbal cues;Armrests   Stand to Sit 5  Supervision   Additional items Increased time required;Verbal cues   Ambulation/Elevation   Gait pattern Improper Weight shift;Decreased foot clearance; Excessively slow  (hopping)   Gait Assistance 4  Minimal assist   Additional items Assist x 1;Verbal cues   Assistive Device Rolling walker   Distance 10'fwd 10'bkwd   Endurance Deficit   Endurance Deficit Yes   Activity Tolerance   Activity Tolerance Patient limited by fatigue;Patient limited by pain   Nurse Made Aware [place] ok to see   Assessment   Prognosis Good   Problem List Decreased strength;Decreased range of motion;Decreased endurance; Impaired balance;Decreased mobility;Pain;Orthopedic restrictions   Assessment Pt seated in recliner upon arrival, reports that he has been getting up to go to the bathroom frequently, pt admits to placing LLE on floor once with increased pain  Pt educated on the important to maintain NWB at all times   Pt agreeable to ambualtion  Pt requires S only for sit to stand trasnfers, once standing pt ambulates hoppinf 10' fwd and 10' bkward declining further due to fatigue and increased pain  Pt will continue to benenfit from skilled PT to increased strength, endurance and balance to maximize safe fucntional mobility  Barriers to Discharge Inaccessible home environment;Decreased caregiver support   Goals   Patient Goals to go to rehab   STG Expiration Date 01/01/21   Short Term Goal #1 Pt will complete bed mobility, transfers, ambulation hopping with RW maintaining NWB LLE 100ft to return home  Plan   Treatment/Interventions Functional transfer training;LE strengthening/ROM; Therapeutic exercise; Endurance training;Bed mobility;Gait training   Progress Progressing toward goals   PT Frequency   (3-5x/week)   Recommendation   PT Discharge Recommendation Post-Acute Rehabilitation Services   Equipment Recommended Walker   PT - OK to Discharge Yes  (to rehab when medically ready)     Destiny Briggs, PTA

## 2020-12-27 NOTE — PROGRESS NOTES
Progress Note - Christin Marrow 1945, 66 y o  male MRN: [id number]    Unit/Bed#: E5 -01 Encounter: [id number]    Primary Care Provider: Salma Simmons MD   Date and time admitted to hospital: 12/18/2020 11:52 AM        * Subacute osteomyelitis of left foot [facility])  Assessment & Plan  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 fifth proximal phalangeal base     - s/p RAY RESECTION FOOT (Left) by Podiatry 12/21/2020, management per primary team  - ID consulted after cultures are + for Enterobacter and Staph aureus  - blood cultures positive for MSSA from 12/18/20  - will need 6 weeks based off intraop culture  - Flagyl d c by ID  - tunneled catheter placed by IR 12/24/20  - plan to discharge on long-term antibiotics  - awaiting rehab    MSSA bacteremia  Assessment & Plan  One of 2 sets from 12/18/2020  Felt to be transient bacteremia in setting of osteomyelitis versus contaminant  Infectious Disease following  2D echo without evidence of vegetation  Initially on Flagyl and cefepime   Further antibiotic management per Infectious Disease  Who now suggests continuing with cefepime alone  Status post tunneled central venous catheter placement for long-term antibiotics per ID recommendations    Preoperative examination  Assessment & Plan  Was cleared by cardiology in preparation for procedure    Anemia of chronic disease  Assessment & Plan  In setting of chronic kidney disease  Hemoglobin baseline around 9-10    Acute kidney injury superimposed on chronic kidney disease ([facility] [address] )  Assessment & Plan  GARY on CKD stage IV now resolved   - creatinine peaked at 2 8  - baseline creatinine appearing 2 4  - patient appears euvolemic  - continue to hold Lasix for now  - renally dose medications  - monitor daily weights  - cr continues to improve - currently 2 14    Type 2 diabetes mellitus, with long-term current use of insulin [facility])  Assessment & Plan  Lab Results Component Value Date    HGBA1C 7 4 (H) 2020     Recent Labs     20  1531 20  2124 20  0738 20  1039   POCGLU 233* 122 95 109   Acceptable control with hemoglobin A1c of 7 4  Home Regimen is Lantus 27 units HS and 10-12 units TID AC  Will reduce Lantus to 10 units-->5 units before bed given lower sugars  Continue sliding scale    Hyperlipidemia  Assessment & Plan  Diet controlled    Hypertension with renal disease  Assessment & Plan  Continue metoprolol succinate 100 mg daily and imdur  Lasix on hold   Consider resuming Lasix within the next few days  Fluid stopped yesterday    CAD (coronary artery disease)  Assessment & Plan  Continue aspirin, metoprolol and Imdur        VTE Pharmacologic Prophylaxis:   Pharmacologic: Heparin  Mechanical VTE Prophylaxis in Place: Yes    Discharge Plan:  Per primary team-Podiatry    Discussions with Specialists or Other Care Team Provider:  Nursing    Education and Discussions with Family / Patient:  Patient    Time Spent for Care: 15 minutes  More than 50% of total time spent on counseling and coordination of care as described above  Current Length of Stay: 9 day(s)  Current Patient Status: Inpatient   Code Status: Level 1 - Full Code    Subjective:   Resting in bed  Had 2 additional looser stools today  Discontinued MiraLax and Senokot  Walking around today  Complains of some pain in his left foot  Objective:     Vitals:   Temp (24hrs), Av 3 °F (36 3 °C), Min:97 °F (36 1 °C), Max:97 6 °F (36 4 °C)    Temp:  [97 °F (36 1 °C)-97 6 °F (36 4 °C)] 97 6 °F (36 4 °C)  HR:  [58-62] 58  Resp:  [18] 18  BP: (140-154)/(77-89) 154/82  SpO2:  [95 %-100 %] 100 %  Body mass index is 28 18 kg/m²  Input and Output Summary (last 24 hours):        Intake/Output Summary (Last 24 hours) at 2020 1525  Last data filed at 2020 1444  Gross per 24 hour   Intake --   Output 850 ml   Net -850 ml       Physical Exam:     Physical Exam  Vitals signs and nursing note reviewed  Constitutional:       General: He is not in acute distress  Appearance: Normal appearance  He is normal weight  He is not ill-appearing, toxic-appearing or diaphoretic  HENT:      Head: Atraumatic  Eyes:      General: No scleral icterus  Cardiovascular:      Rate and Rhythm: Normal rate and regular rhythm  Pulmonary:      Effort: Pulmonary effort is normal  No respiratory distress  Breath sounds: Normal breath sounds  No stridor  No wheezing or rhonchi  Abdominal:      General: Bowel sounds are normal  There is no distension  Palpations: Abdomen is soft  There is no mass  Tenderness: There is no abdominal tenderness  Hernia: No hernia is present  Musculoskeletal:         General: No swelling  Skin:     General: Skin is warm and dry  Comments: Left foot gauze wrapped   Neurological:      Mental Status: He is oriented to person, place, and time  Mental status is at baseline  Psychiatric:         Mood and Affect: Mood normal          Behavior: Behavior normal          Additional Data:     Labs:    Results from last 7 days   Lab Units 12/26/20  0538 12/25/20  0550   WBC Thousand/uL 8 22 8 10   HEMOGLOBIN g/dL 9 2* 8 8*   HEMATOCRIT % 28 6* 27 5*   PLATELETS Thousands/uL 184 166   NEUTROS PCT %  --  66   LYMPHS PCT %  --  19   MONOS PCT %  --  11   EOS PCT %  --  2     Results from last 7 days   Lab Units 12/26/20  0538   POTASSIUM mmol/L 4 0   CHLORIDE mmol/L 105   CO2 mmol/L 22   BUN mg/dL 28*   CREATININE mg/dL 2 14*   CALCIUM mg/dL 8 6           * I Have Reviewed All Lab Data Listed Above  * Additional Pertinent Lab Tests Reviewed:  All Labs Within Last 24 Hours Reviewed    Imaging:    Imaging Reports Reviewed Today Include:   Imaging Personally Reviewed by Myself Includes:      Recent Cultures (last 7 days):     Results from last 7 days   Lab Units 12/21/20  1409 12/21/20  1408 12/21/20  0643 12/21/20  0642   BLOOD CULTURE   --   --  No Growth After 5 Days  No Growth After 5 Days  GRAM STAIN RESULT  No Polys or Bacteria seen No Polys  No organisms seen  --   --    WOUND CULTURE   --  1+ Growth of Staphylococcus aureus*  Few Colonies of Enterobacter aerogenes*  --   --        Last 24 Hours Medication List:   Current Facility-Administered Medications   Medication Dose Route Frequency Provider Last Rate    acetaminophen  975 mg Oral Q6H [address] Rubbie Melvi, DPM      ALPRAZolam  0 25 mg Oral HS PRN Rubbie Melvi, DPM      aspirin  81 mg Oral Daily Rubbie Melvi, DPM      cefepime  2,000 mg Intravenous Q12H Rubbie Melvi, DPM 2,000 mg (12/27/20 1336)    fluticasone  1 spray Each Nare Daily Rubbie Melvi, DPM      gabapentin  600 mg Oral Daily Rubbie Melvi, DPM      heparin (porcine)  5,000 Units Subcutaneous Q12H [address] Rubbie Melvi, DPM      insulin glargine  5 Units Subcutaneous HS Lynda Ang PA-C      insulin lispro  1-5 Units Subcutaneous 4x Daily (AC & HS) Rubbie Melvi, DPM      isosorbide mononitrate  90 mg Oral Daily Rubbie Melvi, DPM      latanoprost  1 drop Both Eyes HS Rubbie Melvi, DPM      loperamide  2 mg Oral TID PRN Irving Masters PA-C      melatonin  3 mg Oral HS PRN Rubbie Melvi, DPM      metoprolol succinate  100 mg Oral Daily Rubbie Melvi, DPM      ondansetron  4 mg Intravenous Q6H PRN Rubbie Melvi, DPM      oxyCODONE  5 mg Oral Q6H PRN Rubbie Melvi, DPM      pantoprazole  40 mg Oral Early Morning Rubbie Melvi, DPM      sertraline  50 mg Oral Daily Rubbie Melvi, DPM      tamsulosin  0 4 mg Oral HS Rubbie Melvi, DPM      traMADol  50 mg Oral Q12H PRN Rubbie Melvi, DPM          Today, Patient Was Seen By: Christal Lu PA-C    ** Please Note: This note has been constructed using a voice recognition system   **

## 2020-12-27 NOTE — PLAN OF CARE
Problem: PHYSICAL THERAPY ADULT  Goal: Performs mobility at highest level of function for planned discharge setting  See evaluation for individualized goals  Description: Treatment/Interventions: Functional transfer training, LE strengthening/ROM, Elevations, Therapeutic exercise, Endurance training, Patient/family training, Equipment eval/education, Bed mobility, Gait training, Spoke to nursing, Spoke to case management, OT  Equipment Recommended: Mat Prader       See flowsheet documentation for full assessment, interventions and recommendations  12/27/2020 1227 by Donna Ramirez PTA  Outcome: Progressing  Note: Prognosis: Good  Problem List: Decreased strength, Decreased range of motion, Decreased endurance, Impaired balance, Decreased mobility, Pain, Orthopedic restrictions  Assessment: Pt seated in recliner upon arrival, reports that he has been getting up to go to the bathroom frequently, pt admits to placing LLE on floor once with increased pain  Pt educated on the important to maintain NWB at all times  Pt agreeable to ambualtion  Pt requires S only for sit to stand trasnfers, once standing pt ambulates hoppinf 10' fwd and 10' bkward declining further due to fatigue and increased pain  Pt will continue to benenfit from skilled PT to increased strength, endurance and balance to maximize safe fucntional mobility  Barriers to Discharge: Inaccessible home environment, Decreased caregiver support     PT Discharge Recommendation: [address]     PT - OK to Discharge: Yes(to rehab when medically ready)    See flowsheet documentation for full assessment       12/27/2020 1226 by Donna Ramirez PTA  Outcome: Progressing  Note: Prognosis: Good  Problem List: Decreased strength, Decreased range of motion, Decreased endurance, Impaired balance, Decreased mobility, Pain, Orthopedic restrictions  Assessment: Pt seated in recliner upon arrival, reports that he has been getting up to go to the bathroom frequently, pt admits to placing LLE on floor once with increased pain  Pt educated on the important to maintain NWB at all times  Pt agreeable to ambualtion  Pt requires S only for sit to stand trasnfers, once standing pt ambulates hoppinf 10' fwd and 10' bkward declining further due to fatigue and increased pain  Pt will continue to benenfit from skilled PT to increased strength, endurance and balance to maximize safe fucntional mobility  Barriers to Discharge: Inaccessible home environment, Decreased caregiver support     PT Discharge Recommendation: [address]     PT - OK to Discharge: Yes(to rehab when medically ready)    See flowsheet documentation for full assessment

## 2020-12-27 NOTE — ASSESSMENT & PLAN NOTE
Lab Results   Component Value Date    HGBA1C 7 4 (H) 12/18/2020     Recent Labs     12/26/20  1531 12/26/20  2124 12/27/20  0738 12/27/20  1039   POCGLU 233* 122 95 109   Acceptable control with hemoglobin A1c of 7 4  Home Regimen is Lantus 27 units HS and 10-12 units TID AC  Will reduce Lantus to 10 units-->5 units before bed given lower sugars     Continue sliding scale

## 2020-12-27 NOTE — PLAN OF CARE
Problem: Potential for Falls  Goal: Patient will remain free of falls  Description: INTERVENTIONS:  - Assess patient frequently for physical needs  -  Identify cognitive and physical deficits and behaviors that affect risk of falls    -  [place] fall precautions as indicated by assessment   - Educate patient/family on patient safety including physical limitations  - Instruct patient to call for assistance with activity based on assessment  - Modify environment to reduce risk of injury  - Consider OT/PT consult to assist with strengthening/mobility  Outcome: Progressing     Problem: MUSCULOSKELETAL - ADULT  Goal: Maintain or return mobility to safest level of function  Description: INTERVENTIONS:  - Assess patient's ability to carry out ADLs; assess patient's baseline for ADL function and identify physical deficits which impact ability to perform ADLs (bathing, care of mouth/teeth, toileting, grooming, dressing, etc )  - Assess/evaluate cause of self-care deficits   - Assess range of motion  - Assess patient's mobility  - Assess patient's need for assistive devices and provide as appropriate  - Encourage maximum independence but intervene and supervise when necessary  - Involve family in performance of ADLs  - Assess for home care needs following discharge   - Consider OT consult to assist with ADL evaluation and planning for discharge  - Provide patient education as appropriate  Outcome: Progressing  Goal: Maintain proper alignment of affected body part  Description: INTERVENTIONS:  - Support, maintain and protect limb and body alignment  - Provide patient/ family with appropriate education  Outcome: Progressing     Problem: PAIN - ADULT  Goal: Verbalizes/displays adequate comfort level or baseline comfort level  Description: Interventions:  - Encourage patient to monitor pain and request assistance  - Assess pain using appropriate pain scale  - Administer analgesics based on type and severity of pain and evaluate response  - Implement non-pharmacological measures as appropriate and evaluate response  - Consider cultural and social influences on pain and pain management  - Notify physician/advanced practitioner if interventions unsuccessful or patient reports new pain  Outcome: Progressing     Problem: INFECTION - ADULT  Goal: Absence or prevention of progression during hospitalization  Description: INTERVENTIONS:  - Assess and monitor for signs and symptoms of infection  - Monitor lab/diagnostic results  - Monitor all insertion sites, i e  indwelling lines, tubes, and drains  - Monitor endotracheal if appropriate and nasal secretions for changes in amount and color  - Golden appropriate cooling/warming therapies per order  - Administer medications as ordered  - Instruct and encourage patient and family to use good hand hygiene technique  - Identify and instruct in appropriate isolation precautions for identified infection/condition  Outcome: Progressing  Goal: Absence of fever/infection during neutropenic period  Description: INTERVENTIONS:  - Monitor WBC    Outcome: Progressing     Problem: SAFETY ADULT  Goal: Patient will remain free of falls  Description: INTERVENTIONS:  - Assess patient frequently for physical needs  -  Identify cognitive and physical deficits and behaviors that affect risk of falls    -  [place] fall precautions as indicated by assessment   - Educate patient/family on patient safety including physical limitations  - Instruct patient to call for assistance with activity based on assessment  - Modify environment to reduce risk of injury  - Consider OT/PT consult to assist with strengthening/mobility  Outcome: Progressing  Goal: Maintain or return to baseline ADL function  Description: INTERVENTIONS:  -  Assess patient's ability to carry out ADLs; assess patient's baseline for ADL function and identify physical deficits which impact ability to perform ADLs (bathing, care of mouth/teeth, toileting, grooming, dressing, etc )  - Assess/evaluate cause of self-care deficits   - Assess range of motion  - Assess patient's mobility; develop plan if impaired  - Assess patient's need for assistive devices and provide as appropriate  - Encourage maximum independence but intervene and supervise when necessary  - Involve family in performance of ADLs  - Assess for home care needs following discharge   - Consider OT consult to assist with ADL evaluation and planning for discharge  - Provide patient education as appropriate  Outcome: Progressing  Goal: Maintain or return mobility status to optimal level  Description: INTERVENTIONS:  - Assess patient's baseline mobility status (ambulation, transfers, stairs, etc )    - Identify cognitive and physical deficits and behaviors that affect mobility  - Identify mobility aids required to assist with transfers and/or ambulation (gait belt, sit-to-stand, lift, walker, cane, etc )  - [place] fall precautions as indicated by assessment  - Record patient progress and toleration of activity level on Mobility SBAR; progress patient to next Phase/Stage  - Instruct patient to call for assistance with activity based on assessment  - Consider rehabilitation consult to assist with strengthening/weightbearing, etc   Outcome: Progressing     Problem: DISCHARGE PLANNING  Goal: Discharge to home or other facility with appropriate resources  Description: INTERVENTIONS:  - Identify barriers to discharge w/patient and caregiver  - Arrange for needed discharge resources and transportation as appropriate  - Identify discharge learning needs (meds, wound care, etc )  - Arrange for interpretive services to assist at discharge as needed  - Refer to Case Management Department for coordinating discharge planning if the patient needs post-hospital services based on physician/advanced practitioner order or complex needs related to functional status, cognitive ability, or social support system  Outcome: Progressing     Problem: Knowledge Deficit  Goal: Patient/family/caregiver demonstrates understanding of disease process, treatment plan, medications, and discharge instructions  Description: Complete learning assessment and assess knowledge base    Interventions:  - Provide teaching at level of understanding  - Provide teaching via preferred learning methods  Outcome: Progressing

## 2020-12-27 NOTE — PROGRESS NOTES
Podiatry - Progress Note  Patient: Alba Galindo 66 y o  male   MRN: [id number]  PCP: Alexis Michael MD  Unit/Bed#: E5 -64 Encounter: [id number]  Date Of Visit: 20    ASSESSMENT:    Alba Galindo is a 66 y o  male with:    1  Left foot osteomyelitis of 5th metatarsal, clean cultures growth in broth only  2  Left foot cellulitis  3  MSSA bacteremia   4  Insulin Dependent Diabetes mellitus  5  Coronary arterial disease  6  Peripheral arterial disease  7  Hypertension    PLAN:    · Await final intra op pathology margins  Plan for 6 weeks cefepime if positive or 2 weeks cefepime if negative, IJ line in place  · Elevation on green foam wedges or pillows when non-ambulatory  · Appreciate consulting services for recommendations and management  Antibiotics started: cefepime  Pharmacologic VTE Prophylaxis: Heparin   Mechanical VTE Prophylaxis: sequential compression device   Weightbearing status:  Nonweightbearing left foot    Disposition:  Patient requires continued stay for final pathology results and placement, CM on board  SUBJECTIVE:     The patient was seen, evaluated, and assessed at bedside today  The patient was awake, alert, and in no acute distress  No acute events overnight  The patient reports no pedal complaints this time  Patient denies N/V/F/chills/SOB/CP  OBJECTIVE:     Vitals:   /82 (BP Location: Right arm)   Pulse 58   Temp 97 6 °F (36 4 °C) (Oral)   Resp 18   Wt 105 kg (231 lb 7 7 oz)   SpO2 100%   BMI 28 18 kg/m²     Temp (24hrs), Av 3 °F (36 3 °C), Min:97 °F (36 1 °C), Max:97 6 °F (36 4 °C)      Physical Exam:     General: Alert, cooperative and no distress  Lungs: Non labored breathing  Abdomen: Soft, non-tender  Lower extremity exam:  Cardiovascular status at baseline  Neurological status at baseline  Musculoskeletal status at baseline  No calf tenderness noted    Dressing clean dry and intact without signs of strike through or erythema, lymphangitis, edema proximal to dressing  Additional Data:     Labs:    Results from last 7 days   Lab Units 12/26/20  0538 12/25/20  0550   WBC Thousand/uL 8 22 8 10   HEMOGLOBIN g/dL 9 2* 8 8*   HEMATOCRIT % 28 6* 27 5*   PLATELETS Thousands/uL 184 166   NEUTROS PCT %  --  66   LYMPHS PCT %  --  19   MONOS PCT %  --  11   EOS PCT %  --  2     Results from last 7 days   Lab Units 12/26/20  0538   POTASSIUM mmol/L 4 0   CHLORIDE mmol/L 105   CO2 mmol/L 22   BUN mg/dL 28*   CREATININE mg/dL 2 14*   CALCIUM mg/dL 8 6           * I Have Reviewed All Lab Data Listed Above  Recent Cultures (last 7 days):     Results from last 7 days   Lab Units 12/21/20  1409 12/21/20  1408 12/21/20  0643 12/21/20  8135   BLOOD CULTURE   --   --  No Growth After 5 Days  No Growth After 5 Days  GRAM STAIN RESULT  No Polys or Bacteria seen No Polys  No organisms seen  --   --    WOUND CULTURE   --  1+ Growth of Staphylococcus aureus*  Few Colonies of Enterobacter aerogenes*  --   --      Results from last 7 days   Lab Units 12/21/20  1409 12/21/20  1408   ANAEROBIC CULTURE  No growth No anaerobes isolated       Imaging: I have personally reviewed pertinent films in PACS  EKG, Pathology, and Other Studies: I have personally reviewed pertinent reports  ** Please Note: Portions of the record may have been created with voice recognition software  Occasional wrong word or "sound a like" substitutions may have occurred due to the inherent limitations of voice recognition software  Read the chart carefully and recognize, using context, where substitutions have occurred   **

## 2020-12-27 NOTE — DISCHARGE INSTRUCTIONS
Central line removal Removal   WHAT YOU NEED TO KNOW:   A central line is a catheter placed through a vein into or near your right atrium  Your right atrium is the right upper chamber of your heart  DISCHARGE INSTRUCTIONS:   Call 911 for any of the following:   · You feel lightheaded, short of breath, and have chest pain  · You start bleeding    Contact Interventional Radiology at [phone] [place] PATIENTS: Contact Interventional Radiology at [phone]) [place] PATIENTS: Contact Interventional Radiology at [phone]) if:  · Blood soaks through your bandage  · You have new swelling in your arm, neck, face, or chest on your right side  · Your bruises or pain get worse  · You have a fever or chills  · Persistent nausea or vomiting  · Your incision is red, swollen, or draining pus  · You have questions or concerns about your condition or care  Self-care:   · Resume your normal diet  Small sips of flat soda will help with nausea  · Keep your dressings dry  Do not get your perma-cath site wet until the incision closes  You may take a tub bath, but do not get your dressings wet  Water in your wound can cause bacteria to grow and cause an infection  If your dressing gets wet, remove the wet dressing and apply a dry bandaid  Keep it covered until the incision closes  This should only take a few days to heal  Do not use soaps or ointments  · Immediately after your catheter is removed, no strenuous activity for twenty four hours and stay in an upright sitting position for two hours  Follow up with your healthcare provider as directed: Write down your questions so you remember to ask them during your visits          Discharge Instructions - Podiatry    Weight Bearing Status: Non-weight bearing to left foot    Pain: Continue analgesics as directed    Follow-up appointment instructions: Please make an appointment within one week of discharge with Dr Emanuel Meléndez   Contact sooner if any increase in pain, or signs of infection occur    Nursing Instructions: Please apply Maxsorb  Then cover with Gauze and secure with Kerlix and tape  Please change dressing every Monday, Wednesday, and Friday

## 2020-12-27 NOTE — ASSESSMENT & PLAN NOTE
Continue metoprolol succinate 100 mg daily and imdur  Lasix on hold   Consider resuming Lasix within the next few days  Fluid stopped yesterday

## 2020-12-28 LAB
ALBUMIN SERPL BCP-MCNC: 2.7 G/DL (ref 3.5–5)
ALP SERPL-CCNC: 116 U/L (ref 46–116)
ALT SERPL W P-5'-P-CCNC: 29 U/L (ref 12–78)
ANION GAP SERPL CALCULATED.3IONS-SCNC: 7 MMOL/L (ref 4–13)
AST SERPL W P-5'-P-CCNC: 25 U/L (ref 5–45)
BILIRUB SERPL-MCNC: 0.23 MG/DL (ref 0.2–1)
BUN SERPL-MCNC: 28 MG/DL (ref 5–25)
CALCIUM ALBUM COR SERPL-MCNC: 9.6 MG/DL (ref 8.3–10.1)
CALCIUM SERPL-MCNC: 8.6 MG/DL (ref 8.3–10.1)
CHLORIDE SERPL-SCNC: 104 MMOL/L (ref 100–108)
CO2 SERPL-SCNC: 26 MMOL/L (ref 21–32)
CREAT SERPL-MCNC: 2.13 MG/DL (ref 0.6–1.3)
ERYTHROCYTE [DISTWIDTH] IN BLOOD BY AUTOMATED COUNT: 13.3 % (ref 11.6–15.1)
GFR SERPL CREATININE-BSD FRML MDRD: 33 ML/MIN/1.73SQ M
GLUCOSE SERPL-MCNC: 101 MG/DL (ref 65–140)
GLUCOSE SERPL-MCNC: 149 MG/DL (ref 65–140)
GLUCOSE SERPL-MCNC: 151 MG/DL (ref 65–140)
GLUCOSE SERPL-MCNC: 156 MG/DL (ref 65–140)
GLUCOSE SERPL-MCNC: 99 MG/DL (ref 65–140)
HCT VFR BLD AUTO: 29 % (ref 36.5–49.3)
HGB BLD-MCNC: 9.4 G/DL (ref 12–17)
MCH RBC QN AUTO: 30.4 PG (ref 26.8–34.3)
MCHC RBC AUTO-ENTMCNC: 32.4 G/DL (ref 31.4–37.4)
MCV RBC AUTO: 94 FL (ref 82–98)
PLATELET # BLD AUTO: 199 THOUSANDS/UL (ref 149–390)
PMV BLD AUTO: 9.8 FL (ref 8.9–12.7)
POTASSIUM SERPL-SCNC: 4.3 MMOL/L (ref 3.5–5.3)
PROT SERPL-MCNC: 6.9 G/DL (ref 6.4–8.2)
RBC # BLD AUTO: 3.09 MILLION/UL (ref 3.88–5.62)
SODIUM SERPL-SCNC: 137 MMOL/L (ref 136–145)
WBC # BLD AUTO: 8.34 THOUSAND/UL (ref 4.31–10.16)

## 2020-12-28 PROCEDURE — 99232 SBSQ HOSP IP/OBS MODERATE 35: CPT | Performed by: PHYSICIAN ASSISTANT

## 2020-12-28 PROCEDURE — 80053 COMPREHEN METABOLIC PANEL: CPT | Performed by: PHYSICIAN ASSISTANT

## 2020-12-28 PROCEDURE — 82948 REAGENT STRIP/BLOOD GLUCOSE: CPT

## 2020-12-28 PROCEDURE — 85027 COMPLETE CBC AUTOMATED: CPT | Performed by: PHYSICIAN ASSISTANT

## 2020-12-28 PROCEDURE — 99233 SBSQ HOSP IP/OBS HIGH 50: CPT | Performed by: INTERNAL MEDICINE

## 2020-12-28 RX ADMIN — FLUTICASONE PROPIONATE 1 SPRAY: 50 SPRAY, METERED NASAL at 08:58

## 2020-12-28 RX ADMIN — LATANOPROST 1 DROP: 50 SOLUTION OPHTHALMIC at 21:37

## 2020-12-28 RX ADMIN — INSULIN GLARGINE 5 UNITS: 100 INJECTION, SOLUTION SUBCUTANEOUS at 21:39

## 2020-12-28 RX ADMIN — GABAPENTIN 600 MG: 300 CAPSULE ORAL at 08:57

## 2020-12-28 RX ADMIN — TRAMADOL HYDROCHLORIDE 50 MG: 50 TABLET, FILM COATED ORAL at 19:58

## 2020-12-28 RX ADMIN — LOPERAMIDE HYDROCHLORIDE 2 MG: 2 CAPSULE ORAL at 05:05

## 2020-12-28 RX ADMIN — HEPARIN SODIUM 5000 UNITS: 5000 INJECTION INTRAVENOUS; SUBCUTANEOUS at 21:38

## 2020-12-28 RX ADMIN — ALPRAZOLAM 0.25 MG: 0.25 TABLET ORAL at 21:42

## 2020-12-28 RX ADMIN — INSULIN LISPRO 1 UNITS: 100 INJECTION, SOLUTION INTRAVENOUS; SUBCUTANEOUS at 08:56

## 2020-12-28 RX ADMIN — CEFEPIME HYDROCHLORIDE 2000 MG: 2 INJECTION, POWDER, FOR SOLUTION INTRAVENOUS at 12:26

## 2020-12-28 RX ADMIN — SERTRALINE HYDROCHLORIDE 50 MG: 50 TABLET ORAL at 08:57

## 2020-12-28 RX ADMIN — INSULIN LISPRO 1 UNITS: 100 INJECTION, SOLUTION INTRAVENOUS; SUBCUTANEOUS at 21:37

## 2020-12-28 RX ADMIN — TAMSULOSIN HYDROCHLORIDE 0.4 MG: 0.4 CAPSULE ORAL at 21:37

## 2020-12-28 RX ADMIN — PANTOPRAZOLE SODIUM 40 MG: 40 TABLET, DELAYED RELEASE ORAL at 05:05

## 2020-12-28 RX ADMIN — HEPARIN SODIUM 5000 UNITS: 5000 INJECTION INTRAVENOUS; SUBCUTANEOUS at 08:56

## 2020-12-28 RX ADMIN — ACETAMINOPHEN 975 MG: 325 TABLET ORAL at 18:04

## 2020-12-28 RX ADMIN — METOPROLOL SUCCINATE 100 MG: 50 TABLET, EXTENDED RELEASE ORAL at 08:57

## 2020-12-28 RX ADMIN — ACETAMINOPHEN 975 MG: 325 TABLET ORAL at 12:24

## 2020-12-28 RX ADMIN — ISOSORBIDE MONONITRATE 90 MG: 30 TABLET, EXTENDED RELEASE ORAL at 08:56

## 2020-12-28 RX ADMIN — ASPIRIN 81 MG CHEWABLE TABLET 81 MG: 81 TABLET CHEWABLE at 08:56

## 2020-12-28 NOTE — ASSESSMENT & PLAN NOTE
One of 2 sets from 12/18/2020  Felt to be transient bacteremia in setting of osteomyelitis versus contaminant  Infectious Disease following  2D echo without evidence of vegetation  Initially on Flagyl and cefepime  Further antibiotic management per Infectious Disease  Who now suggests continuing with cefepime alone  Status post tunneled central venous catheter placement for antibiotics- per ID recommendations

## 2020-12-28 NOTE — ASSESSMENT & PLAN NOTE
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 fifth proximal phalangeal base     - s/p RAY RESECTION FOOT (Left) by Podiatry 12/21/2020  - blood cultures positive for MSSA from 12/18/20  - repeat blood cultures remain negative  - tunneled catheter placed by IR 12/24/20  - on IV cefepime per ID  - ID awaiting path margins for final determination on abx     -path margins negative - cefazolin through 1/4/20 to complete 14 day course   -path margins positive - continue cefepime through 1/30/20 to complete 6 weeks   - awaiting rehab

## 2020-12-28 NOTE — ASSESSMENT & PLAN NOTE
Continue metoprolol succinate 100 mg daily and imdur  Lasix on hold   Consider resuming Lasix within the next few days  Fluid stopped 12/26/20

## 2020-12-28 NOTE — PROGRESS NOTES
Progress Note - Yefri Ray 1945, 66 y o  male MRN: [id number]    Unit/Bed#: E5 -01 Encounter: [id number]    Primary Care Provider: Chava Dalton MD   Date and time admitted to hospital: 12/18/2020 11:52 AM        * Subacute osteomyelitis of left foot [facility])  Assessment & Plan  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 fifth proximal phalangeal base     - s/p RAY RESECTION FOOT (Left) by Podiatry 12/21/2020  - blood cultures positive for MSSA from 12/18/20  - repeat blood cultures remain negative  - tunneled catheter placed by IR 12/24/20  - on IV cefepime per ID  - ID awaiting path margins for final determination on abx     -path margins negative - cefazolin through 1/4/20 to complete 14 day course   -path margins positive - continue cefepime through 1/30/20 to complete 6 weeks   - awaiting rehab    Hypertension with renal disease  Assessment & Plan  Continue metoprolol succinate 100 mg daily and imdur  Lasix on hold   Consider resuming Lasix within the next few days  Fluid stopped 12/26/20    MSSA bacteremia  Assessment & Plan  One of 2 sets from 12/18/2020  Felt to be transient bacteremia in setting of osteomyelitis versus contaminant  Infectious Disease following  2D echo without evidence of vegetation  Initially on Flagyl and cefepime  Further antibiotic management per Infectious Disease  Who now suggests continuing with cefepime alone  Status post tunneled central venous catheter placement for antibiotics- per ID recommendations    Anemia of chronic disease  Assessment & Plan  In setting of chronic kidney disease  Hemoglobin baseline around 9-10    Acute kidney injury superimposed on chronic kidney disease ([facility] [address] )  Assessment & Plan  GARY on CKD stage IV now resolved   - creatinine peaked at 2 8  - baseline creatinine appearing 2 4  - patient appears euvolemic  - continue to hold Lasix for now  - cr continues to improve - currently 2 13    Type 2 diabetes mellitus, with long-term current use of insulin [facility])  Assessment & Plan  Lab Results   Component Value Date    HGBA1C 7 4 (H) 2020     Recent Labs     20  1620 20  2058 20  0804 20  1108   POCGLU 129 174* 156* 101   Acceptable control with hemoglobin A1c of 7 4  Home Regimen is Lantus 27 units HS and 10-12 units TID AC  Will reduce Lantus to 10 units-->5 units before bed given lower sugars  Continue sliding scale    CAD (coronary artery disease)  Assessment & Plan  Continue aspirin, metoprolol, and Imdur        VTE Pharmacologic Prophylaxis:   Pharmacologic: Heparin  Mechanical VTE Prophylaxis in Place: Yes    Discharge Plan: Awaiting path margins for abx recommendations  Pending placement    Discussions with Specialists or Other Care Team Provider: nursing    Education and Discussions with Family / Patient: patient    Time Spent for Care: 20 minutes  More than 50% of total time spent on counseling and coordination of care as described above  Current Length of Stay: 10 day(s)  Current Patient Status: Inpatient   Code Status: Level 1 - Full Code    Subjective: In bathroom upon arrival  Stool is started to become more formed  No diarrhea  Eating and drinking okay  Feels good    Objective:     Vitals:   Temp (24hrs), Av °F (36 7 °C), Min:98 °F (36 7 °C), Max:98 °F (36 7 °C)    Temp:  [98 °F (36 7 °C)] 98 °F (36 7 °C)  HR:  [62-68] 68  Resp:  [18] 18  BP: (147-169)/(73-84) 155/73  SpO2:  [98 %-99 %] 98 %  Body mass index is 28 55 kg/m²  Input and Output Summary (last 24 hours): Intake/Output Summary (Last 24 hours) at 2020 1311  Last data filed at 2020 0748  Gross per 24 hour   Intake --   Output 1550 ml   Net -1550 ml       Physical Exam:     Physical Exam  Vitals signs and nursing note reviewed  Constitutional:       General: He is not in acute distress  Appearance: Normal appearance  He is normal weight   He is not ill-appearing, toxic-appearing or diaphoretic  HENT:      Head: Normocephalic and atraumatic  Eyes:      General: No scleral icterus  Cardiovascular:      Rate and Rhythm: Normal rate and regular rhythm  Pulmonary:      Effort: Pulmonary effort is normal  No respiratory distress  Breath sounds: Normal breath sounds  No stridor  No wheezing or rhonchi  Abdominal:      General: Bowel sounds are normal  There is no distension  Palpations: Abdomen is soft  There is no mass  Tenderness: There is no abdominal tenderness  Hernia: No hernia is present  Musculoskeletal:         General: No swelling  Skin:     General: Skin is warm and dry  Comments: Left foot gauze wrapped   Neurological:      Mental Status: He is oriented to person, place, and time  Mental status is at baseline  Psychiatric:         Mood and Affect: Mood normal          Behavior: Behavior normal          Additional Data:     Labs:    Results from last 7 days   Lab Units 12/28/20  0507  12/25/20  0550   WBC Thousand/uL 8 34   < > 8 10   HEMOGLOBIN g/dL 9 4*   < > 8 8*   HEMATOCRIT % 29 0*   < > 27 5*   PLATELETS Thousands/uL 199   < > 166   NEUTROS PCT %  --   --  66   LYMPHS PCT %  --   --  19   MONOS PCT %  --   --  11   EOS PCT %  --   --  2    < > = values in this interval not displayed  Results from last 7 days   Lab Units 12/28/20  0507   POTASSIUM mmol/L 4 3   CHLORIDE mmol/L 104   CO2 mmol/L 26   BUN mg/dL 28*   CREATININE mg/dL 2 13*   CALCIUM mg/dL 8 6   ALK PHOS U/L 116   ALT U/L 29   AST U/L 25           * I Have Reviewed All Lab Data Listed Above  * Additional Pertinent Lab Tests Reviewed:  All Labs Within Last 24 Hours Reviewed    Imaging:    Imaging Reports Reviewed Today Include:   Imaging Personally Reviewed by Myself Includes:      Recent Cultures (last 7 days):     Results from last 7 days   Lab Units 12/21/20  1409 12/21/20  1408   GRAM STAIN RESULT  No Polys or Bacteria seen No Polys  No organisms seen   WOUND CULTURE   --  1+ Growth of Staphylococcus aureus*  Few Colonies of Enterobacter aerogenes*       Last 24 Hours Medication List:   Current Facility-Administered Medications   Medication Dose Route Frequency Provider Last Rate    acetaminophen  975 mg Oral Q6H [address] Maritza Wilkerson, DPM      ALPRAZolam  0 25 mg Oral HS PRN Maritza Wilkerson, DPM      aspirin  81 mg Oral Daily Maritza Wilkerson, DPM      cefepime  2,000 mg Intravenous Q12H Maritza Wilkerson, DPM 2,000 mg (12/28/20 1226)    fluticasone  1 spray Each Nare Daily Maritza Wilkerson, DPM      gabapentin  600 mg Oral Daily Maritza Wilkerson, DPM      heparin (porcine)  5,000 Units Subcutaneous Q12H [address] Maritza Wilkerson, DPM      insulin glargine  5 Units Subcutaneous HS Lynda Ang PA-C      insulin lispro  1-5 Units Subcutaneous 4x Daily (AC & HS) Maritza Wilekrson, DPM      isosorbide mononitrate  90 mg Oral Daily Maritza Wilkerson, DPM      latanoprost  1 drop Both Eyes HS Maritza Wilkerson, DPM      loperamide  2 mg Oral TID PRN [employer], BO      melatonin  3 mg Oral HS PRN Maritza Wilkerson, DPM      metoprolol succinate  100 mg Oral Daily Maritza Wilkerson, DPM      ondansetron  4 mg Intravenous Q6H PRN Maritza Wilkerson, DPM      oxyCODONE  5 mg Oral Q6H PRN Maritza Wilkerson, DPM      pantoprazole  40 mg Oral Early Morning Maritza Wilkerson, DPM      sertraline  50 mg Oral Daily Maritza Wilkerson, DPM      tamsulosin  0 4 mg Oral HS Maritza Wilkerson, DPM      traMADol  50 mg Oral Q12H PRN Maritza Wilkerson, DPM          Today, Patient Was Seen By: Lexii Randall PA-C    ** Please Note: This note has been constructed using a voice recognition system   **

## 2020-12-28 NOTE — ASSESSMENT & PLAN NOTE
GARY on CKD stage IV now resolved   - creatinine peaked at 2 8  - baseline creatinine appearing 2 4  - patient appears euvolemic  - continue to hold Lasix for now  - cr continues to improve - currently 2 13

## 2020-12-28 NOTE — PLAN OF CARE
Problem: Potential for Falls  Goal: Patient will remain free of falls  Description: INTERVENTIONS:  - Assess patient frequently for physical needs  -  Identify cognitive and physical deficits and behaviors that affect risk of falls    -  [place] fall precautions as indicated by assessment   - Educate patient/family on patient safety including physical limitations  - Instruct patient to call for assistance with activity based on assessment  - Modify environment to reduce risk of injury  - Consider OT/PT consult to assist with strengthening/mobility  Outcome: Progressing     Problem: MUSCULOSKELETAL - ADULT  Goal: Maintain or return mobility to safest level of function  Description: INTERVENTIONS:  - Assess patient's ability to carry out ADLs; assess patient's baseline for ADL function and identify physical deficits which impact ability to perform ADLs (bathing, care of mouth/teeth, toileting, grooming, dressing, etc )  - Assess/evaluate cause of self-care deficits   - Assess range of motion  - Assess patient's mobility  - Assess patient's need for assistive devices and provide as appropriate  - Encourage maximum independence but intervene and supervise when necessary  - Involve family in performance of ADLs  - Assess for home care needs following discharge   - Consider OT consult to assist with ADL evaluation and planning for discharge  - Provide patient education as appropriate  Outcome: Progressing  Goal: Maintain proper alignment of affected body part  Description: INTERVENTIONS:  - Support, maintain and protect limb and body alignment  - Provide patient/ family with appropriate education  Outcome: Progressing     Problem: PAIN - ADULT  Goal: Verbalizes/displays adequate comfort level or baseline comfort level  Description: Interventions:  - Encourage patient to monitor pain and request assistance  - Assess pain using appropriate pain scale  - Administer analgesics based on type and severity of pain and evaluate response  - Implement non-pharmacological measures as appropriate and evaluate response  - Consider cultural and social influences on pain and pain management  - Notify physician/advanced practitioner if interventions unsuccessful or patient reports new pain  Outcome: Progressing     Problem: INFECTION - ADULT  Goal: Absence or prevention of progression during hospitalization  Description: INTERVENTIONS:  - Assess and monitor for signs and symptoms of infection  - Monitor lab/diagnostic results  - Monitor all insertion sites, i e  indwelling lines, tubes, and drains  - Monitor endotracheal if appropriate and nasal secretions for changes in amount and color  - [place] appropriate cooling/warming therapies per order  - Administer medications as ordered  - Instruct and encourage patient and family to use good hand hygiene technique  - Identify and instruct in appropriate isolation precautions for identified infection/condition  Outcome: Progressing  Goal: Absence of fever/infection during neutropenic period  Description: INTERVENTIONS:  - Monitor WBC    Outcome: Progressing     Problem: SAFETY ADULT  Goal: Patient will remain free of falls  Description: INTERVENTIONS:  - Assess patient frequently for physical needs  -  Identify cognitive and physical deficits and behaviors that affect risk of falls    -  [place] fall precautions as indicated by assessment   - Educate patient/family on patient safety including physical limitations  - Instruct patient to call for assistance with activity based on assessment  - Modify environment to reduce risk of injury  - Consider OT/PT consult to assist with strengthening/mobility  Outcome: Progressing  Goal: Maintain or return to baseline ADL function  Description: INTERVENTIONS:  -  Assess patient's ability to carry out ADLs; assess patient's baseline for ADL function and identify physical deficits which impact ability to perform ADLs (bathing, care of mouth/teeth, toileting, grooming, dressing, etc )  - Assess/evaluate cause of self-care deficits   - Assess range of motion  - Assess patient's mobility; develop plan if impaired  - Assess patient's need for assistive devices and provide as appropriate  - Encourage maximum independence but intervene and supervise when necessary  - Involve family in performance of ADLs  - Assess for home care needs following discharge   - Consider OT consult to assist with ADL evaluation and planning for discharge  - Provide patient education as appropriate  Outcome: Progressing  Goal: Maintain or return mobility status to optimal level  Description: INTERVENTIONS:  - Assess patient's baseline mobility status (ambulation, transfers, stairs, etc )    - Identify cognitive and physical deficits and behaviors that affect mobility  - Identify mobility aids required to assist with transfers and/or ambulation (gait belt, sit-to-stand, lift, walker, cane, etc )  - [place] fall precautions as indicated by assessment  - Record patient progress and toleration of activity level on Mobility SBAR; progress patient to next Phase/Stage  - Instruct patient to call for assistance with activity based on assessment  - Consider rehabilitation consult to assist with strengthening/weightbearing, etc   Outcome: Progressing     Problem: DISCHARGE PLANNING  Goal: Discharge to home or other facility with appropriate resources  Description: INTERVENTIONS:  - Identify barriers to discharge w/patient and caregiver  - Arrange for needed discharge resources and transportation as appropriate  - Identify discharge learning needs (meds, wound care, etc )  - Arrange for interpretive services to assist at discharge as needed  - Refer to Case Management Department for coordinating discharge planning if the patient needs post-hospital services based on physician/advanced practitioner order or complex needs related to functional status, cognitive ability, or social support system  Outcome: Progressing     Problem: Knowledge Deficit  Goal: Patient/family/caregiver demonstrates understanding of disease process, treatment plan, medications, and discharge instructions  Description: Complete learning assessment and assess knowledge base    Interventions:  - Provide teaching at level of understanding  - Provide teaching via preferred learning methods  Outcome: Progressing

## 2020-12-28 NOTE — PROGRESS NOTES
Progress Note - Infectious Disease   Demian Albright 66 y o  male MRN: [id number]  Unit/Bed#: E5 -01 Encounter: [id number]    Impression/Plan:  1  MSSA bacteremia  With only 1 of 2 sets positive   While this could represent a contaminant, the patient had the same organism isolated in the foot wound which had been worsening  Therefore I suspect this is more of a transient bacteremia   Consideration for the possibility of endocarditis although I doubt  TTE was negative for valvular vegetation   Fortunately the patient is not systemically ill and has remained hemodynamically stable  His repeat blood cultures were negative after five days  Tunneled central line has been placed   -antibiotic as below  -monitor CBC and BMP  -monitor vitals     2  Left foot osteomyelitis  Primarily involving the 5th metatarsal but also the phalanx   Infection appeared to be polymicrobial although I suspect that MSSA is the primary invasive organism  Patient is now status post 5th ray amputation  He is receiving IV cefepime which he is tolerating without difficulty  Path margins are pending  If negative we can simplify the antibiotic regimen to cefazolin to complete 14 days of antibiotics after cleared blood cultures  If positive we can continue the cefepime through 01/30/2021 to complete six weeks after cleared blood cultures for residual osteomyelitis    -continue IV cefepime for now  -if path margins negative, can simplify the antibiotics to cefazolin with a plan to continue through 1/4/2021 to complete 14 days from the 1st negative blood culture   -if path margins are positive for osteomyelitis, the patient will need cefepime through 1/30/2021 to complete 6 weeks total for residual osteomyelitis versus additional resection  -monitor CBC and BMP  -monitor vitals  -serial left foot exams  -local wound care per Podiatry  -continue close follow-up with Podiatry     3   Chronic kidney disease-advanced   Renal function has been waxing and waning   Possibly a pre renal issue   -monitor creatinine  -dose adjust antibiotics for renal function as needed  -volume management per SLIM  -avoid nephrotoxins     4  Chronic dysuria  With positive urine culture for BCG as expected post treatment for bladder cancer   The patient is now status post biopsy with mild chronic inflammation but AFB smears negative for invasive disease   AFB Culture from the biopsies negative thus far at 4 week  -follow-up AFB cultures  -ongoing follow-up with outpatient Urology and Infectious Disease after discharge    Above plan was discussed in detail with patient at the bedside  Antibiotics:  Cefepime 9  Antibiotics 11  Postop #7    Subjective:  Patient reports he is feeling well today  He has no new symptoms to report  He has no fever, chills, sweats, shakes; no nausea, vomiting, abdominal pain, and dysuria; he still has some diarrhea but it is less frequent and more formed today; no cough, shortness of breath, or chest pain  Objective:  Vitals:  Temp:  [98 °F (36 7 °C)] 98 °F (36 7 °C)  HR:  [62-68] 68  Resp:  [18] 18  BP: (147-169)/(73-84) 155/73  SpO2:  [98 %-99 %] 98 %  Temp (24hrs), Av °F (36 7 °C), Min:98 °F (36 7 °C), Max:98 °F (36 7 °C)  Current: Temperature: 98 °F (36 7 °C)    Physical Exam:   General Appearance:  Alert, interactive, nontoxic, no acute distress  Patient appears comfortable sitting up in bed  Podiatry is currently performing wound dressing change  Throat: Oropharynx moist without lesions  Lungs:   Clear to auscultation bilaterally; no wheezes, rhonchi or rales; respirations unlabored; patient is on room air   Heart:  RRR; no murmur, rub or gallop   Abdomen:   Soft, non-tender, non-distended, positive bowel sounds       Extremities: No clubbing or cyanosis; left lateral foot incision with some weeping towards center of incision, output is serosanguineous, there is some periwound maceration   Skin: No new rashes noted on exposed skin      Labs, Imaging, & Other studies:   All pertinent labs and imaging studies were personally reviewed  Results from last 7 days   Lab Units 12/28/20  0507 12/26/20  0538 12/25/20  0550   WBC Thousand/uL 8 34 8 22 8 10   HEMOGLOBIN g/dL 9 4* 9 2* 8 8*   PLATELETS Thousands/uL 199 184 166     Results from last 7 days   Lab Units 12/28/20  0507   POTASSIUM mmol/L 4 3   CHLORIDE mmol/L 104   CO2 mmol/L 26   BUN mg/dL 28*   CREATININE mg/dL 2 13*   EGFR ml/min/1 73sq m 33   CALCIUM mg/dL 8 6   AST U/L 25   ALT U/L 29   ALK PHOS U/L 116     Results from last 7 days   Lab Units 12/21/20  1409 12/21/20  1408   GRAM STAIN RESULT  No Polys or Bacteria seen No Polys  No organisms seen   WOUND CULTURE   --  1+ Growth of Staphylococcus aureus*  Few Colonies of Enterobacter aerogenes*

## 2020-12-28 NOTE — PROGRESS NOTES
[facility] Podiatry - Progress Note  Patient: Sherman Chan 66 y o  male   MRN: [id number]  PCP: Ralph Shipman MD  Unit/Bed#: E5 -01 Encounter: [id number]  Date Of Visit: 20    ASSESSMENT:    Sherman Chan is a 66 y o  male with:    1  Left foot ulcer with underlying Osteomyelitis of 5th metatarsal - [place] 3  - s/p partial 5th ray resection (DOS 2020)  2  Left foot cellulitis  3  MSSA bacteremia   4  Insulin Dependent Diabetes mellitus  5  Coronary arterial disease  6  Peripheral arterial disease  7  Hypertension  8  Chronic Kidney Disease stage 4      PLAN:    · Continue post-operative wound care and serial foot exams  · Final intra-op pathology report from clean margin pending  · Intra-op bone culture from 20 with growth of Enterobacter in broth  Wound culture with 1+ MSSA and Enterobacter  · Repeat blood cultures negative after 5 days  · Continue IV Cefepime for now  Patient will require long term abx via IJ PICC  Will deescalate to Ancef through 21 if clean margins negative, will continue Cefepime through 21 if positive  Per ID  · Placement pending  CM following  Weight bearing status: NWB LLE  DVT prophylaxis: Heparin  Antibiotics: Cefepime    Disposition: Patient will require continued inpatient stay for the above    SUBJECTIVE:     The patient was seen, evaluated, and assessed at bedside today  The patient was awake, alert, and in no acute distress  No acute events overnight  The patient is tolerating the abx well  Had bout of diarrhea but is now resolving  Patient denies N/V/F/chills/SOB/CP  OBJECTIVE:     Vitals:   /73 (BP Location: Right arm)   Pulse 68   Temp 98 °F (36 7 °C) (Temporal)   Resp 18   Wt 106 kg (234 lb 9 1 oz)   SpO2 98%   BMI 28 55 kg/m²     Temp (24hrs), Av °F (36 7 °C), Min:98 °F (36 7 °C), Max:98 °F (36 7 °C)      Physical Exam:     General:  Alert, cooperative, and in no distress    Lower extremity exam:  LLE dressings CDI  IJ PICC in place  Cardiovascular status at baseline  Neurological status at baseline  Musculoskeletal status at baseline  No calf tenderness noted  Surgical site located lateral left foot, skin margins well approximate, sutures intact, serous drainage, with mild periwound maceration  No clinical signs of dehiscence or acute infection noted  Clinical Images 12/28/20:    Left foot    Additional Data:     Labs:    Results from last 7 days   Lab Units 12/28/20  0507  12/25/20  0550   WBC Thousand/uL 8 34   < > 8 10   HEMOGLOBIN g/dL 9 4*   < > 8 8*   HEMATOCRIT % 29 0*   < > 27 5*   PLATELETS Thousands/uL 199   < > 166   NEUTROS PCT %  --   --  66   LYMPHS PCT %  --   --  19   MONOS PCT %  --   --  11   EOS PCT %  --   --  2    < > = values in this interval not displayed  Results from last 7 days   Lab Units 12/28/20  0507   POTASSIUM mmol/L 4 3   CHLORIDE mmol/L 104   CO2 mmol/L 26   BUN mg/dL 28*   CREATININE mg/dL 2 13*   CALCIUM mg/dL 8 6   ALK PHOS U/L 116   ALT U/L 29   AST U/L 25           * I Have Reviewed All Lab Data Listed Above  Recent Cultures (last 7 days):     Results from last 7 days   Lab Units 12/21/20  1409 12/21/20  1408   GRAM STAIN RESULT  No Polys or Bacteria seen No Polys  No organisms seen   WOUND CULTURE   --  1+ Growth of Staphylococcus aureus*  Few Colonies of Enterobacter aerogenes*     Results from last 7 days   Lab Units 12/21/20  1409 12/21/20  1408   ANAEROBIC CULTURE  No growth No anaerobes isolated       Imaging: I have personally reviewed pertinent films in PACS  EKG, Pathology, and Other Studies: I have personally reviewed pertinent reports  ** Please Note: Portions of the record may have been created with voice recognition software  Occasional wrong word or "sound a like" substitutions may have occurred due to the inherent limitations of voice recognition software  Read the chart carefully and recognize, using context, where substitutions have occurred  **

## 2020-12-28 NOTE — ASSESSMENT & PLAN NOTE
Lab Results   Component Value Date    HGBA1C 7 4 (H) 12/18/2020     Recent Labs     12/27/20  1620 12/27/20 2058 12/28/20  0804 12/28/20  1108   POCGLU 129 174* 156* 101   Acceptable control with hemoglobin A1c of 7 4  Home Regimen is Lantus 27 units HS and 10-12 units TID AC  Will reduce Lantus to 10 units-->5 units before bed given lower sugars     Continue sliding scale

## 2020-12-29 LAB
GLUCOSE SERPL-MCNC: 110 MG/DL (ref 65–140)
GLUCOSE SERPL-MCNC: 119 MG/DL (ref 65–140)
GLUCOSE SERPL-MCNC: 142 MG/DL (ref 65–140)
GLUCOSE SERPL-MCNC: 89 MG/DL (ref 65–140)

## 2020-12-29 PROCEDURE — 97530 THERAPEUTIC ACTIVITIES: CPT

## 2020-12-29 PROCEDURE — 82948 REAGENT STRIP/BLOOD GLUCOSE: CPT

## 2020-12-29 PROCEDURE — 99233 SBSQ HOSP IP/OBS HIGH 50: CPT | Performed by: INTERNAL MEDICINE

## 2020-12-29 PROCEDURE — 97116 GAIT TRAINING THERAPY: CPT

## 2020-12-29 PROCEDURE — 99232 SBSQ HOSP IP/OBS MODERATE 35: CPT | Performed by: PHYSICIAN ASSISTANT

## 2020-12-29 PROCEDURE — 97535 SELF CARE MNGMENT TRAINING: CPT

## 2020-12-29 PROCEDURE — 97110 THERAPEUTIC EXERCISES: CPT

## 2020-12-29 RX ADMIN — ISOSORBIDE MONONITRATE 90 MG: 30 TABLET, EXTENDED RELEASE ORAL at 08:10

## 2020-12-29 RX ADMIN — CEFEPIME HYDROCHLORIDE 2000 MG: 2 INJECTION, POWDER, FOR SOLUTION INTRAVENOUS at 01:02

## 2020-12-29 RX ADMIN — HEPARIN SODIUM 5000 UNITS: 5000 INJECTION INTRAVENOUS; SUBCUTANEOUS at 08:08

## 2020-12-29 RX ADMIN — CEFEPIME HYDROCHLORIDE 2000 MG: 2 INJECTION, POWDER, FOR SOLUTION INTRAVENOUS at 23:51

## 2020-12-29 RX ADMIN — METOPROLOL SUCCINATE 100 MG: 50 TABLET, EXTENDED RELEASE ORAL at 08:09

## 2020-12-29 RX ADMIN — TAMSULOSIN HYDROCHLORIDE 0.4 MG: 0.4 CAPSULE ORAL at 21:39

## 2020-12-29 RX ADMIN — ACETAMINOPHEN 975 MG: 325 TABLET ORAL at 12:32

## 2020-12-29 RX ADMIN — ACETAMINOPHEN 975 MG: 325 TABLET ORAL at 06:15

## 2020-12-29 RX ADMIN — ACETAMINOPHEN 975 MG: 325 TABLET ORAL at 01:01

## 2020-12-29 RX ADMIN — SERTRALINE HYDROCHLORIDE 50 MG: 50 TABLET ORAL at 08:10

## 2020-12-29 RX ADMIN — HEPARIN SODIUM 5000 UNITS: 5000 INJECTION INTRAVENOUS; SUBCUTANEOUS at 21:39

## 2020-12-29 RX ADMIN — ASPIRIN 81 MG CHEWABLE TABLET 81 MG: 81 TABLET CHEWABLE at 08:09

## 2020-12-29 RX ADMIN — ACETAMINOPHEN 975 MG: 325 TABLET ORAL at 18:06

## 2020-12-29 RX ADMIN — GABAPENTIN 600 MG: 300 CAPSULE ORAL at 08:09

## 2020-12-29 RX ADMIN — CEFEPIME HYDROCHLORIDE 2000 MG: 2 INJECTION, POWDER, FOR SOLUTION INTRAVENOUS at 12:32

## 2020-12-29 RX ADMIN — ACETAMINOPHEN 975 MG: 325 TABLET ORAL at 23:50

## 2020-12-29 RX ADMIN — PANTOPRAZOLE SODIUM 40 MG: 40 TABLET, DELAYED RELEASE ORAL at 06:15

## 2020-12-29 RX ADMIN — LATANOPROST 1 DROP: 50 SOLUTION OPHTHALMIC at 21:39

## 2020-12-29 RX ADMIN — TRAMADOL HYDROCHLORIDE 50 MG: 50 TABLET, FILM COATED ORAL at 08:09

## 2020-12-29 NOTE — ASSESSMENT & PLAN NOTE
Lab Results   Component Value Date    HGBA1C 7 4 (H) 12/18/2020     Recent Labs     12/28/20  1621 12/28/20  2044 12/29/20  0740 12/29/20  1118   POCGLU 149* 151* 89 119   Acceptable control with hemoglobin A1c of 7 4  Home Regimen is Lantus 27 units HS and short-acting insulin 10-12 units TID AC  Will reduce Lantus to 10 units-->5 units before bed given lower sugars  Given patient with lower blood sugars will discontinue Lantus    Follow sugars closely on when to resume Lantus  Continue insulin sliding scale

## 2020-12-29 NOTE — PHYSICAL THERAPY NOTE
45' session     12/29/20 4167   PT Last Visit   PT Visit Date 12/29/20   Note Type   Note Type Treatment   Pain Assessment   Pain Assessment Tool 0-10   Pain Score 7   Pain Location/Orientation Orientation: Left; Location: Foot   Pain Onset/Description Onset: Ongoing; Descriptor: Aching   Patient's Stated Pain Goal No pain   Hospital Pain Intervention(s) Repositioned;Elevated   Restrictions/Precautions   LLE Weight Bearing Per Order NWB   Other Precautions Pain; Fall Risk;WBS   General   Chart Reviewed Yes   Family/Caregiver Present No   Cognition   Overall Cognitive Status WFL   Arousal/Participation Alert; Cooperative   Attention Within functional limits   Following Commands Follows all commands and directions without difficulty   Subjective   Subjective i really don't have a lot of room to use a wc in my house   Bed Mobility   Rolling R 6  Modified independent   Rolling L 6  Modified independent   Supine to Sit 6  Modified independent   Sit to Supine 6  Modified independent   Additional Comments bed flat no rail   Transfers   Sit to Stand   (CG - pt tends to put L heel on floor as he gets up)   Stand to Sit   (CG - tends to put L heel on floor)   Stand pivot 4  Minimal assistance   Ambulation/Elevation   Gait pattern Improper Weight shift;Decreased foot clearance; Short stride  (sneaker on R - unable to clear foot- rushes- dec use of UEs)   Gait Assistance 4  Minimal assist   Additional items Assist x 1   Assistive Device Rolling walker   Distance 15' and flopped on bottom of bed then 4'   Balance   Static Standing Fair -   Dynamic Standing Poor +   Ambulatory Poor +   Endurance Deficit   Endurance Deficit Yes   Endurance Deficit Description fatigues easily   Activity Tolerance   Activity Tolerance Patient limited by fatigue;Patient limited by pain  (iincrease pain with foot in dependent postion)   Exercises   Quad Sets 20 reps   Heelslides Bilateral;AROM;20 reps   Hip Flexion Bilateral;AROM;20 reps  (SLR)   Hip Abduction Bilateral;AROM;20 reps   Knee AROM Long Arc Quad Bilateral;AROM;20 reps   Ankle Pumps Bilateral;AROM;20 reps  (with legs elevated)   UE Exercise   (chair push ups with R foot on floor-3" hold x 10)   Assessment   Prognosis Good   Problem List Decreased strength;Decreased endurance;Decreased mobility;Pain   Assessment Pt reports having a hard time with amb 2* weakness in R knee and past shoulder surgeries  Us eof sneaker today with cues to increase foot clearance with slower pace as pt goes too fast and lides LR foot on floor  Fatigues easily and flopped on bottom of bed without reaching back - educated in need to control descent with use of UEs  Pt also tends to put L foot on floor with STS transfers - educated in need to hold foot up at all ties  Discussed wc abilities in the home with pt report of small areas   Advised pt to cont with I LE ex and chair push ups- pt agreeable   Barriers to Discharge Inaccessible home environment;Decreased caregiver support   Barriers to Discharge Comments reports that wife cannot help very much   Goals   Patient Goals having surgery on Thurs   STG Expiration Date 01/01/20   PT Treatment Day 2   Plan   Treatment/Interventions   (cont as per POC)   Progress Progressing toward goals   PT Frequency   (3-5x/wk)   Recommendation   PT Discharge Recommendation Post-Acute Rehabilitation Services

## 2020-12-29 NOTE — PROGRESS NOTES
Progress Note - Infectious Disease   Rachael Foss 66 y o  male MRN: [id number]  Unit/Bed#: E5 -01 Encounter: [id number]    ADDENDUM 12/29/2020 11:34 - Bone margin pathology report continues to show osteomyelitis  Spoke to podiatry, there are plans for surgical revision later this week  ID will continue to follow and maintain patient on Cefepime  Will await new surgical outcome to address antibiotic duration  Impression/Plan:  1  MSSA bacteremia  With only 1 of 2 sets positive   While this could represent a contaminant, the patient had the same organism isolated in the foot wound which had been worsening  Therefore I suspect this is more of a transient bacteremia  TTE was negative for valvular vegetation   Fortunately the patient is not systemically ill and has remained hemodynamically stable  His repeat blood cultures were negative after five days  Today he is afebrile  His WBC count has been normal   -antibiotic as below  -monitor CBC and BMP  -monitor vitals     2  Left foot osteomyelitis  Primarily involving the 5th metatarsal but also the phalanx   Infection appeared to be polymicrobial although I suspect that MSSA is the primary invasive organism  Patient is now status post 5th ray amputation  He is receiving IV cefepime which he is tolerating without difficulty  Path margins are pending  If negative we can simplify the antibiotic regimen to cefazolin to complete 14 days of antibiotics after cleared blood cultures  If positive we can continue the cefepime through 01/30/2021 to complete six weeks after cleared blood cultures for residual osteomyelitis    -continue IV cefepime for now  -if path margins negative, can simplify the antibiotics to cefazolin with a plan to continue through 1/4/2021 to complete 14 days from the 1st negative blood culture   -if path margins are positive for osteomyelitis, the patient will need cefepime through 1/30/2021 to complete 6 weeks total for residual osteomyelitis versus additional resection  -monitor CBC and BMP  -monitor vitals  -serial left foot exams  -local wound care per Podiatry  -continue close follow-up with Podiatry     3   Chronic kidney disease-advanced   Renal function has been waxing and waning   Possibly a pre renal issue  Patient's last creatinine was 2 13 on 2020   -monitor creatinine  -dose adjust antibiotics for renal function as needed  -volume management per SLIM  -avoid nephrotoxins     4  Chronic dysuria  With positive urine culture for BCG as expected post treatment for bladder cancer   The patient is now status post biopsy with mild chronic inflammation but AFB smears negative for invasive disease   AFB Culture from the biopsies negative thus far at 4 week  A new repeat urine AFB culture is now pending   -follow-up AFB cultures  -ongoing follow-up with outpatient Urology and Infectious Disease after discharge    Above plan was discussed in detail with patient at the bedside  Antibiotics:  Cefepime 10  Antibiotics 12  Postop #8    Subjective:  Patient reports he's feeling well today  Is eager to get his pathology report back  He is having some throbbing in the L foot today  No concerns with his dressing since last change by podiatry yesterday  He has no fever, chills, sweats, shakes; no nausea, vomiting, or abdominal pain; diarrhea continues to improve with use of Imodium; no cough, shortness of breath, or chest pain  No new symptoms  Objective:  Vitals:  Temp:  [97 7 °F (36 5 °C)-98 °F (36 7 °C)] 98 °F (36 7 °C)  HR:  [60-70] 70  Resp:  [18] 18  BP: (115-154)/(74-93) 146/74  SpO2:  [97 %-98 %] 97 %  Temp (24hrs), Av 9 °F (36 6 °C), Min:97 7 °F (36 5 °C), Max:98 °F (36 7 °C)  Current: Temperature: 98 °F (36 7 °C)    Physical Exam:   General Appearance:  Alert, interactive, nontoxic, no acute distress  He appears comfortable sitting up in bed having breakfast    Throat: Oropharynx moist without lesions      Lungs:   Clear to auscultation bilaterally; no wheezes, rhonchi or rales; respirations unlabored; patient is on room air  Heart:  RRR; no murmur, rub or gallop   Abdomen:   Soft, non-tender, non-distended, positive bowel sounds  Extremities: No clubbing or cyanosis; Bulky dressing intact to L foot with overlying ace, no breakthrough drainage   Skin: No new rashes noted on exposed skin       Labs, Imaging, & Other studies:   All pertinent labs and imaging studies were personally reviewed  Results from last 7 days   Lab Units 12/28/20  0507 12/26/20  0538 12/25/20  0550   WBC Thousand/uL 8 34 8 22 8 10   HEMOGLOBIN g/dL 9 4* 9 2* 8 8*   PLATELETS Thousands/uL 199 184 166     Results from last 7 days   Lab Units 12/28/20  0507   POTASSIUM mmol/L 4 3   CHLORIDE mmol/L 104   CO2 mmol/L 26   BUN mg/dL 28*   CREATININE mg/dL 2 13*   EGFR ml/min/1 73sq m 33   CALCIUM mg/dL 8 6   AST U/L 25   ALT U/L 29   ALK PHOS U/L 116

## 2020-12-29 NOTE — ASSESSMENT & PLAN NOTE
GARY on CKD stage IV now resolved   - creatinine peaked at 2 8  - baseline creatinine appearing 2 4  - patient appears euvolemic  - cr continues to improve - currently 2 13

## 2020-12-29 NOTE — ASSESSMENT & PLAN NOTE
Continue metoprolol succinate 100 mg daily and imdur  Lasix initially held while on IV fluids  Fluid stopped 12/26/20  Appears patient is actually only taking Lasix as needed for lower extremity edema    Monitor volume status closely to determine Lasix would benefit pt  No indication to resume Lasix at this time

## 2020-12-29 NOTE — OCCUPATIONAL THERAPY NOTE
[address] Progress Note     Patient Name: Aaron Soria  Today's Date: 12/29/2020  Problem List  Principal Problem:    Subacute osteomyelitis of left foot ([facility] [address] )  Active Problems:    CAD (coronary artery disease)    Hypertension with renal disease    Type 2 diabetes mellitus, with long-term current use of insulin (HCC)    Acute kidney injury superimposed on chronic kidney disease ([facility] [address] )    Anemia of chronic disease    MSSA bacteremia          12/29/20 0949   OT Last Visit   OT Visit Date 12/29/20   Note Type   Note Type Treatment   Restrictions/Precautions   Weight Bearing Precautions Per Order Yes   LLE Weight Bearing Per Order NWB   Other Precautions Pain; Fall Risk;WBS   General   Response to Previous Treatment Patient with no complaints from previous session   Lifestyle   Autonomy At baseline, pt was I w/ ADLs, IADLs, and functional mobility/transfers w/o use of AD and reports 3 falls PTA 2* balance deficits  Reciprocal Relationships Spouse   Service to Others Retired   Intrinsic Gratification Watching TV   Pain Assessment   Pain Assessment Tool 0-10   Pain Score 4   Pain Location/Orientation Orientation: Left; Location: Foot   Hospital Pain Intervention(s) Repositioned; Ambulation/increased activity; Emotional support; Rest   Multiple Pain Sites No   ADL   Where Assessed Edge of bed   Grooming Assistance 5  Supervision/Setup   Grooming Deficit Setup;Supervision/safety; Increased time to complete   LB Dressing Assistance 4  Minimal Assistance   LB Dressing Deficit Setup;Steadying; Requires assistive device for steadying;Verbal cueing;Supervision/safety; Increased time to complete; Don/doff R sock; Don/doff L sock; Thread RLE into underwear; Thread LLE into underwear;Pull up over hips   LB Dressing Comments LB dressing completed w/ Min A   Pt able to don/doff R sock and thread B/L LEs into underwear with Supervision while seated, however Min A required when standing to pull underwear over hips 2* decreased dynamic standing balance demonstrated  Functional Standing Tolerance   Time 2-3 mins   Activity Dynamic standing balance activity   Comments Min A for balance/steadying   Bed Mobility   Supine to Sit 5  Supervision   Additional items HOB elevated; Bedrails; Increased time required   Sit to Supine 5  Supervision   Additional items Increased time required;Verbal cues   Additional Comments Pt lying supine at end of session with call bell and phone within reach  All needs met and pt reports no further questions for OT at this time  Transfers   Sit to Stand 5  Supervision   Additional items Bedrails; Increased time required;Verbal cues   Stand to Sit 4  Minimal assistance   Additional items Assist x 1; Armrests; Increased time required;Verbal cues  (decreased controlled descent)   Functional Mobility   Functional Mobility 4  Minimal assistance   Additional Comments Assist x1   Additional items Rolling walker   Therapeutic Exercise - ROM   UE-ROM Yes   ROM- Right Upper Extremities   R Shoulder AROM; Flexion; Extension;Horizontal ABduction   R Elbow AROM;Elbow flexion;Elbow extension   RUE ROM Comment Reviewed HEP with pt to increase UE ROM/strength needed for ADLs/transfers  Pt tolerated well, educated pt on performing HEP 3x/day with pt verbalizing understanding of education  ROM - Left Upper Extremities    L Shoulder AROM; Flexion; Extension;Horizontal ABduction   L Elbow AROM;Elbow flexion;Elbow extension   LUE ROM Comment Reviewed HEP with pt to increase UE ROM/strength needed for ADLs/transfers  Pt tolerated well, educated pt on performing HEP 3x/day with pt verbalizing understanding of education  Cognition   Overall Cognitive Status WFL   Arousal/Participation Alert; Responsive; Cooperative   Attention Within functional limits   Orientation Level Oriented X4   Memory Within functional limits   Following Commands Follows multistep commands without difficulty   Activity Tolerance   Activity Tolerance Patient limited by pain;Patient limited by fatigue;Patient tolerated treatment well   Medical Staff Made Aware Akil Andrade RN   Assessment   Assessment Pt seen for OT treatment session focusing on functional activity tolerance, bed mobility, ADLs, functional mobility/transfers, and UE ROM/strengthening  Pt alert and cooperative throughout session  Pt lying supine at start of session, completing bed mobility (supine>sit) with Supervision w/ use of bed rails and increased time to complete  Sit>stand transfers completed w/ Supervision w/ cues for safe technique and hand placement  Min A required for functional mobility with assist for balance/steadying  Pt w/ increased fatigue and R LE buckling with increased distance, requiring Min A for controlled descent to surface for stand>sit  ADL tasks completed while seated EOB  LB dressing completed w/ Min A  Pt able to don/doff R sock and thread B/L LEs into underwear with Supervision while seated, however Min A required when standing to pull underwear over hips 2* decreased dynamic standing balance demonstrated  Reviewed HEP with pt to increase UE ROM/strength needed for ADLs/transfers  Pt tolerated well, educated pt on performing HEP 3x/day with pt verbalizing understanding of education  Pt lying supine at end of session with call bell and phone within reach  All needs met and pt reports no further questions for OT at this time  Continue to recommend STR when medically cleared  OT to follow pt on caseload  Plan   Treatment Interventions ADL retraining;Functional transfer training;UE strengthening/ROM; Endurance training;Patient/family training;Equipment evaluation/education; Compensatory technique education; Energy conservation; Activityengagement   Goal Expiration Date 01/05/21   OT Treatment Day 2   OT Frequency 3-5x/wk   Recommendation   OT Discharge Recommendation Post-Acute Rehabilitation Services   OT - OK to Discharge Yes  (when medically cleared to rehab)   Barthel Index   Feeding 10 Bathing 0   Grooming Score 5   Dressing Score 5   Bladder Score 10   Bowels Score 10   Toilet Use Score 5   Transfers (Bed/Chair) Score 10   Mobility (Level Surface) Score 0   Stairs Score 0   Barthel Index Score 55   Modified [place] Scale   Modified [place] Scale 4          Hannah Atkins, OTR/L

## 2020-12-29 NOTE — PROGRESS NOTES
Progress Note - Masha Johnson 1945, 66 y o  male MRN: [id number]    Unit/Bed#: E5 -01 Encounter: [id number]    Primary Care Provider: Macho Cole MD   Date and time admitted to hospital: 12/18/2020 11:52 AM        * Subacute osteomyelitis of left foot [facility])  Assessment & Plan  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 fifth proximal phalangeal base  - s/p RAY RESECTION FOOT (Left) by Podiatry 12/21/2020  - blood cultures positive for MSSA from 12/18/20  - repeat blood cultures remain negative  - tunneled catheter placed by IR 12/24/20  - on IV cefepime per ID  - Path margins + for osteomyelitis  - Podiatry plans to take patient back to OR 12/31/20 for left 5th metatarsal resection  - ID recommendations on antibiotics   -path margins negative - cefazolin through 1/4/20 to complete 14 day course   -path margins positive - continue cefepime through 1/30/20 to complete 6 weeks   - will need rehab when medically stable    Type 2 diabetes mellitus, with long-term current use of insulin [facility])  Assessment & Plan  Lab Results   Component Value Date    HGBA1C 7 4 (H) 12/18/2020     Recent Labs     12/28/20  1621 12/28/20  2044 12/29/20  0740 12/29/20  1118   POCGLU 149* 151* 89 119   Acceptable control with hemoglobin A1c of 7 4  Home Regimen is Lantus 27 units HS and short-acting insulin 10-12 units TID AC  Will reduce Lantus to 10 units-->5 units before bed given lower sugars  Given patient with lower blood sugars will discontinue Lantus    Follow sugars closely on when to resume Lantus  Continue insulin sliding scale    Hypertension with renal disease  Assessment & Plan  Continue metoprolol succinate 100 mg daily and imdur  Lasix initially held while on IV fluids  Fluid stopped 12/26/20  Will resume Lasix therapy at this time    MSSA bacteremia  Assessment & Plan  One of 2 sets from 12/18/2020  Felt to be transient bacteremia in setting of osteomyelitis versus contaminant  Infectious Disease following  2D echo without evidence of vegetation  Initially on Flagyl and cefepime  Further antibiotic management per Infectious Disease  Who now suggests continuing with cefepime alone  Status post tunneled central venous catheter placement for antibiotics- per ID recommendations    Anemia of chronic disease  Assessment & Plan  In setting of chronic kidney disease  Hemoglobin baseline around 9-10    Acute kidney injury superimposed on chronic kidney disease ([facility] [address] )  Assessment & Plan  GARY on CKD stage IV now resolved   - creatinine peaked at 2 8  - baseline creatinine appearing 2 4  - patient appears euvolemic  - cr continues to improve - currently 2 13  - Lasix resumed 20    CAD (coronary artery disease)  Assessment & Plan  Continue aspirin, metoprolol, and Imdur      VTE Pharmacologic Prophylaxis:   Pharmacologic: Heparin  Mechanical VTE Prophylaxis in Place: Yes    Discharge Plan:  Per primary team-Podiatry  Will need rehab upon discharge    Discussions with Specialists or Other Care Team Provider:  Nursing    Education and Discussions with Family / Patient:  Patient    Time Spent for Care: 20 minutes  More than 50% of total time spent on counseling and coordination of care as described above  Current Length of Stay: 11 day(s)  Current Patient Status: Inpatient   Code Status: Level 1 - Full Code    Subjective:   Path margins resulted and are positive for osteomyelitis  Patient will proceed back to the OR on 20  Otherwise patient eating drinking without difficulty  Bowel movements are formed  Objective:     Vitals:   Temp (24hrs), Av 9 °F (36 6 °C), Min:97 7 °F (36 5 °C), Max:98 °F (36 7 °C)    Temp:  [97 7 °F (36 5 °C)-98 °F (36 7 °C)] 98 °F (36 7 °C)  HR:  [60-70] 70  Resp:  [18] 18  BP: (115-154)/(74-93) 146/74  SpO2:  [97 %-98 %] 97 %  Body mass index is 28 45 kg/m²       Input and Output Summary (last 24 hours): Intake/Output Summary (Last 24 hours) at 12/29/2020 1339  Last data filed at 12/29/2020 0809  Gross per 24 hour   Intake 240 ml   Output --   Net 240 ml       Physical Exam:     Physical Exam  Vitals signs and nursing note reviewed  Constitutional:       General: He is not in acute distress  Appearance: Normal appearance  He is normal weight  He is not ill-appearing, toxic-appearing or diaphoretic  HENT:      Head: Atraumatic  Eyes:      General: No scleral icterus  Cardiovascular:      Rate and Rhythm: Normal rate and regular rhythm  Pulmonary:      Effort: Pulmonary effort is normal  No respiratory distress  Breath sounds: Normal breath sounds  No stridor  No wheezing or rhonchi  Abdominal:      General: Bowel sounds are normal  There is no distension  Palpations: Abdomen is soft  There is no mass  Tenderness: There is no abdominal tenderness  Hernia: No hernia is present  Musculoskeletal:      Comments: Left foot gauze wrapped   Skin:     General: Skin is warm and dry  Neurological:      Mental Status: He is oriented to person, place, and time  Mental status is at baseline  Psychiatric:         Mood and Affect: Mood normal          Behavior: Behavior normal          Additional Data:     Labs:    Results from last 7 days   Lab Units 12/28/20  0507  12/25/20  0550   WBC Thousand/uL 8 34   < > 8 10   HEMOGLOBIN g/dL 9 4*   < > 8 8*   HEMATOCRIT % 29 0*   < > 27 5*   PLATELETS Thousands/uL 199   < > 166   NEUTROS PCT %  --   --  66   LYMPHS PCT %  --   --  19   MONOS PCT %  --   --  11   EOS PCT %  --   --  2    < > = values in this interval not displayed  Results from last 7 days   Lab Units 12/28/20  0507   POTASSIUM mmol/L 4 3   CHLORIDE mmol/L 104   CO2 mmol/L 26   BUN mg/dL 28*   CREATININE mg/dL 2 13*   CALCIUM mg/dL 8 6   ALK PHOS U/L 116   ALT U/L 29   AST U/L 25           * I Have Reviewed All Lab Data Listed Above    * Additional Pertinent Lab Tests Reviewed: All Labs Within Last 24 Hours Reviewed    Imaging:    Imaging Reports Reviewed Today Include:   Imaging Personally Reviewed by Myself Includes:      Recent Cultures (last 7 days):           Last 24 Hours Medication List:   Current Facility-Administered Medications   Medication Dose Route Frequency Provider Last Rate    acetaminophen  975 mg Oral Q6H [address] Thomas Em, DPM      ALPRAZolam  0 25 mg Oral HS PRN Thomas Em, DPM      aspirin  81 mg Oral Daily Thomas Em, DPM      cefepime  2,000 mg Intravenous Q12H Thomas Em, DPM 2,000 mg (12/29/20 1232)    fluticasone  1 spray Each Nare Daily Thomas Em, DPM      gabapentin  600 mg Oral Daily Thomas Em, DPM      heparin (porcine)  5,000 Units Subcutaneous Q12H [address] Thomas Em, DPM      insulin lispro  1-5 Units Subcutaneous 4x Daily (AC & HS) Thomas Em, DPM      isosorbide mononitrate  90 mg Oral Daily Thomas Em, DPM      latanoprost  1 drop Both Eyes HS Thomas Em, DPM      loperamide  2 mg Oral TID PRN Shereen Lala PA-C      melatonin  3 mg Oral HS PRN Thomas Em, DPM      metoprolol succinate  100 mg Oral Daily Thomas Em, DPM      ondansetron  4 mg Intravenous Q6H PRN Thomas Em, DPM      oxyCODONE  5 mg Oral Q6H PRN Thomas Em, DPM      pantoprazole  40 mg Oral Early Morning Thomas Em, DPM      sertraline  50 mg Oral Daily Thomas Em, DPM      tamsulosin  0 4 mg Oral HS Thomas Em, DPM      traMADol  50 mg Oral Q12H PRN Thomas Em, DPM          Today, Patient Was Seen By: Oren Luque PA-C    ** Please Note: This note has been constructed using a voice recognition system   **

## 2020-12-29 NOTE — PLAN OF CARE
Problem: PHYSICAL THERAPY ADULT  Goal: Performs mobility at highest level of function for planned discharge setting  See evaluation for individualized goals  Description: Treatment/Interventions: Functional transfer training, LE strengthening/ROM, Elevations, Therapeutic exercise, Endurance training, Patient/family training, Equipment eval/education, Bed mobility, Gait training, Spoke to nursing, Spoke to case management, OT  Equipment Recommended: Kimani Stephenson       See flowsheet documentation for full assessment, interventions and recommendations  Note: Prognosis: Good  Problem List: Decreased strength, Decreased endurance, Decreased mobility, Pain  Assessment: Pt reports having a hard time with amb 2* weakness in R knee and past shoulder surgeries  Us eof sneaker today with cues to increase foot clearance with slower pace as pt goes too fast and lides LR foot on floor  Fatigues easily and flopped on bottom of bed without reaching back - educated in need to control descent with use of UEs  Pt also tends to put L foot on floor with STS transfers - educated in need to hold foot up at all ties  Discussed wc abilities in the home with pt report of small areas  Advised pt to cont with I LE ex and chair push ups- pt agreeable  Barriers to Discharge: Inaccessible home environment, Decreased caregiver support  Barriers to Discharge Comments: reports that wife cannot help very much  PT Discharge Recommendation: Post-Acute Rehabilitation Services     PT - OK to Discharge: Yes(to rehab when medically ready)    See flowsheet documentation for full assessment

## 2020-12-29 NOTE — ASSESSMENT & PLAN NOTE
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 fifth proximal phalangeal base     - s/p RAY RESECTION FOOT (Left) by Podiatry 12/21/2020  - blood cultures positive for MSSA from 12/18/20  - repeat blood cultures remain negative  - tunneled catheter placed by IR 12/24/20  - on IV cefepime per ID  - Path margins + for osteomyelitis  - Podiatry plans to take patient back to OR 12/31/20 for left 5th metatarsal resection  - ID recommendations on antibiotics   -path margins negative - cefazolin through 1/4/20 to complete 14 day course   -path margins positive - continue cefepime through 1/30/20 to complete 6 weeks   - will need rehab when medically stable

## 2020-12-29 NOTE — PROGRESS NOTES
[facility] - Progress Note  Patient: Elizabeth Ramírez 66 y o  male   MRN: [id number]  PCP: Cameron Larios MD  Unit/Bed#: E5 -49 Encounter: [id number]  Date Of Visit: 20    ASSESSMENT:    Elizabeth Ramírez is a 66 y o  male with:    1  Left foot ulcer with underlying Osteomyelitis of 5th metatarsal - [place] 3  - s/p partial 5th ray resection (DOS 2020)  2  Left foot cellulitis  3  MSSA bacteremia   4  Insulin Dependent Diabetes mellitus  5  Coronary arterial disease  6  Peripheral arterial disease  7  Hypertension  8  Chronic Kidney Disease stage 4      PLAN:    · Will plan to take patient to the OR on Thursday,  2020, for left 5th metatarsal resection with Dr Irving Mclaughlin  · Final intra-op pathology report on clean margin positive for osteomyelitis  · Continue IV cefepime for now  Await final recs from ID  · Appreciate SLIM for medical co-management  · CM following for placement  Awaiting path report  Weight bearing status: NWB LLE  DVT prophylaxis: Heparin  Antibiotics: Cefepime    Disposition: Patient will require continued inpatient stay for the above    SUBJECTIVE:     The patient was seen, evaluated, and assessed at bedside today  The patient was awake, alert, and in no acute distress  No acute events overnight  The patient reports no pain or discomfort a the foot  BM normalizing, appetite normal  Tolerating abx well  Patient denies N/V/F/chills/SOB/CP  OBJECTIVE:     Vitals:   /74 (BP Location: Left arm)   Pulse 70   Temp 98 °F (36 7 °C) (Tympanic)   Resp 18   Wt 106 kg (233 lb 11 oz)   SpO2 97%   BMI 28 45 kg/m²     Temp (24hrs), Av 9 °F (36 6 °C), Min:97 7 °F (36 5 °C), Max:98 °F (36 7 °C)      Physical Exam:     General:  Alert, cooperative, and in no distress  Lower extremity exam:  Cardiovascular status at baseline  Neurological status at baseline  Musculoskeletal status at baseline  No calf tenderness noted   Surgical site located on the lateral aspect of the left foot, sutures intact, skin margins well approximated, mild drainage with central maceration, mild periwound erythema, stable since last visit  No clinical signs of acute infection  Additional Data:     Labs:    Results from last 7 days   Lab Units 12/28/20  0507  12/25/20  0550   WBC Thousand/uL 8 34   < > 8 10   HEMOGLOBIN g/dL 9 4*   < > 8 8*   HEMATOCRIT % 29 0*   < > 27 5*   PLATELETS Thousands/uL 199   < > 166   NEUTROS PCT %  --   --  66   LYMPHS PCT %  --   --  19   MONOS PCT %  --   --  11   EOS PCT %  --   --  2    < > = values in this interval not displayed  Results from last 7 days   Lab Units 12/28/20  0507   POTASSIUM mmol/L 4 3   CHLORIDE mmol/L 104   CO2 mmol/L 26   BUN mg/dL 28*   CREATININE mg/dL 2 13*   CALCIUM mg/dL 8 6   ALK PHOS U/L 116   ALT U/L 29   AST U/L 25           * I Have Reviewed All Lab Data Listed Above  Recent Cultures (last 7 days):               Imaging: I have personally reviewed pertinent films in PACS  EKG, Pathology, and Other Studies: I have personally reviewed pertinent reports  ** Please Note: Portions of the record may have been created with voice recognition software  Occasional wrong word or "sound a like" substitutions may have occurred due to the inherent limitations of voice recognition software  Read the chart carefully and recognize, using context, where substitutions have occurred   **

## 2020-12-30 ENCOUNTER — ANESTHESIA EVENT (INPATIENT)
Dept: PERIOP | Facility: HOSPITAL | Age: 75
DRG: 617 | End: 2020-12-30
Payer: MEDICARE

## 2020-12-30 LAB
ANION GAP SERPL CALCULATED.3IONS-SCNC: 3 MMOL/L (ref 4–13)
BASOPHILS # BLD MANUAL: 0.08 THOUSAND/UL (ref 0–0.1)
BASOPHILS NFR MAR MANUAL: 1 % (ref 0–1)
BUN SERPL-MCNC: 36 MG/DL (ref 5–25)
CALCIUM SERPL-MCNC: 8.5 MG/DL (ref 8.3–10.1)
CHLORIDE SERPL-SCNC: 103 MMOL/L (ref 100–108)
CO2 SERPL-SCNC: 29 MMOL/L (ref 21–32)
CREAT SERPL-MCNC: 2.28 MG/DL (ref 0.6–1.3)
EOSINOPHIL # BLD MANUAL: 0.17 THOUSAND/UL (ref 0–0.4)
EOSINOPHIL NFR BLD MANUAL: 2 % (ref 0–6)
ERYTHROCYTE [DISTWIDTH] IN BLOOD BY AUTOMATED COUNT: 13.2 % (ref 11.6–15.1)
GFR SERPL CREATININE-BSD FRML MDRD: 31 ML/MIN/1.73SQ M
GLUCOSE SERPL-MCNC: 116 MG/DL (ref 65–140)
GLUCOSE SERPL-MCNC: 123 MG/DL (ref 65–140)
GLUCOSE SERPL-MCNC: 156 MG/DL (ref 65–140)
GLUCOSE SERPL-MCNC: 169 MG/DL (ref 65–140)
GLUCOSE SERPL-MCNC: 98 MG/DL (ref 65–140)
HCT VFR BLD AUTO: 30.1 % (ref 36.5–49.3)
HGB BLD-MCNC: 9.7 G/DL (ref 12–17)
LYMPHOCYTES # BLD AUTO: 1.93 THOUSAND/UL (ref 0.6–4.47)
LYMPHOCYTES # BLD AUTO: 23 % (ref 14–44)
MCH RBC QN AUTO: 30.3 PG (ref 26.8–34.3)
MCHC RBC AUTO-ENTMCNC: 32.2 G/DL (ref 31.4–37.4)
MCV RBC AUTO: 94 FL (ref 82–98)
MONOCYTES # BLD AUTO: 0.59 THOUSAND/UL (ref 0–1.22)
MONOCYTES NFR BLD: 7 % (ref 4–12)
MYELOCYTES NFR BLD MANUAL: 2 % (ref 0–1)
NEUTROPHILS # BLD MANUAL: 5.29 THOUSAND/UL (ref 1.85–7.62)
NEUTS BAND NFR BLD MANUAL: 2 % (ref 0–8)
NEUTS SEG NFR BLD AUTO: 61 % (ref 43–75)
NRBC BLD AUTO-RTO: 0 /100 WBCS
PLATELET # BLD AUTO: 228 THOUSANDS/UL (ref 149–390)
PLATELET BLD QL SMEAR: ADEQUATE
PMV BLD AUTO: 10.2 FL (ref 8.9–12.7)
POTASSIUM SERPL-SCNC: 4.6 MMOL/L (ref 3.5–5.3)
RBC # BLD AUTO: 3.2 MILLION/UL (ref 3.88–5.62)
RBC MORPH BLD: NORMAL
SODIUM SERPL-SCNC: 135 MMOL/L (ref 136–145)
TOTAL CELLS COUNTED SPEC: 100
VARIANT LYMPHS # BLD AUTO: 2 %
WBC # BLD AUTO: 8.4 THOUSAND/UL (ref 4.31–10.16)

## 2020-12-30 PROCEDURE — 82948 REAGENT STRIP/BLOOD GLUCOSE: CPT

## 2020-12-30 PROCEDURE — 97116 GAIT TRAINING THERAPY: CPT

## 2020-12-30 PROCEDURE — 85027 COMPLETE CBC AUTOMATED: CPT | Performed by: INTERNAL MEDICINE

## 2020-12-30 PROCEDURE — 85007 BL SMEAR W/DIFF WBC COUNT: CPT | Performed by: INTERNAL MEDICINE

## 2020-12-30 PROCEDURE — 99232 SBSQ HOSP IP/OBS MODERATE 35: CPT | Performed by: INTERNAL MEDICINE

## 2020-12-30 PROCEDURE — 99233 SBSQ HOSP IP/OBS HIGH 50: CPT | Performed by: INTERNAL MEDICINE

## 2020-12-30 PROCEDURE — 80048 BASIC METABOLIC PNL TOTAL CA: CPT | Performed by: INTERNAL MEDICINE

## 2020-12-30 PROCEDURE — 87116 MYCOBACTERIA CULTURE: CPT | Performed by: INTERNAL MEDICINE

## 2020-12-30 PROCEDURE — 97110 THERAPEUTIC EXERCISES: CPT

## 2020-12-30 PROCEDURE — 87206 SMEAR FLUORESCENT/ACID STAI: CPT | Performed by: INTERNAL MEDICINE

## 2020-12-30 RX ORDER — SODIUM CHLORIDE 9 MG/ML
75 INJECTION, SOLUTION INTRAVENOUS CONTINUOUS
Status: DISCONTINUED | OUTPATIENT
Start: 2020-12-31 | End: 2020-12-31

## 2020-12-30 RX ADMIN — OXYCODONE HYDROCHLORIDE 5 MG: 5 TABLET ORAL at 09:22

## 2020-12-30 RX ADMIN — INSULIN LISPRO 1 UNITS: 100 INJECTION, SOLUTION INTRAVENOUS; SUBCUTANEOUS at 22:01

## 2020-12-30 RX ADMIN — CEFEPIME HYDROCHLORIDE 2000 MG: 2 INJECTION, POWDER, FOR SOLUTION INTRAVENOUS at 12:10

## 2020-12-30 RX ADMIN — INSULIN LISPRO 1 UNITS: 100 INJECTION, SOLUTION INTRAVENOUS; SUBCUTANEOUS at 17:15

## 2020-12-30 RX ADMIN — ISOSORBIDE MONONITRATE 90 MG: 30 TABLET, EXTENDED RELEASE ORAL at 09:19

## 2020-12-30 RX ADMIN — ACETAMINOPHEN 975 MG: 325 TABLET ORAL at 23:49

## 2020-12-30 RX ADMIN — ACETAMINOPHEN 975 MG: 325 TABLET ORAL at 05:14

## 2020-12-30 RX ADMIN — ASPIRIN 81 MG CHEWABLE TABLET 81 MG: 81 TABLET CHEWABLE at 09:19

## 2020-12-30 RX ADMIN — LATANOPROST 1 DROP: 50 SOLUTION OPHTHALMIC at 22:01

## 2020-12-30 RX ADMIN — TAMSULOSIN HYDROCHLORIDE 0.4 MG: 0.4 CAPSULE ORAL at 22:01

## 2020-12-30 RX ADMIN — ACETAMINOPHEN 975 MG: 325 TABLET ORAL at 13:20

## 2020-12-30 RX ADMIN — SERTRALINE HYDROCHLORIDE 50 MG: 50 TABLET ORAL at 09:21

## 2020-12-30 RX ADMIN — CEFEPIME HYDROCHLORIDE 2000 MG: 2 INJECTION, POWDER, FOR SOLUTION INTRAVENOUS at 23:50

## 2020-12-30 RX ADMIN — SODIUM CHLORIDE 75 ML/HR: 0.9 INJECTION, SOLUTION INTRAVENOUS at 23:50

## 2020-12-30 RX ADMIN — HEPARIN SODIUM 5000 UNITS: 5000 INJECTION INTRAVENOUS; SUBCUTANEOUS at 22:01

## 2020-12-30 RX ADMIN — HEPARIN SODIUM 5000 UNITS: 5000 INJECTION INTRAVENOUS; SUBCUTANEOUS at 09:22

## 2020-12-30 RX ADMIN — GABAPENTIN 600 MG: 300 CAPSULE ORAL at 09:19

## 2020-12-30 RX ADMIN — METOPROLOL SUCCINATE 100 MG: 50 TABLET, EXTENDED RELEASE ORAL at 09:20

## 2020-12-30 RX ADMIN — PANTOPRAZOLE SODIUM 40 MG: 40 TABLET, DELAYED RELEASE ORAL at 05:14

## 2020-12-30 NOTE — ASSESSMENT & PLAN NOTE
Lab Results   Component Value Date    HGBA1C 7 4 (H) 12/18/2020     Recent Labs     12/29/20  2110 12/30/20  0703 12/30/20  1127 12/30/20  1457   POCGLU 142* 98 116 156*   A1c at goal  Blood sugar stable  Perioperative blood sugar goal less than 180  Currently off Lantus and just on sliding scale insulin

## 2020-12-30 NOTE — PHYSICAL THERAPY NOTE
45' session     12/30/20 1158   PT Last Visit   PT Visit Date 12/30/20   Note Type   Note Type Treatment   Pain Assessment   Pain Assessment Tool 0-10   Pain Score 7   Pain Location/Orientation Orientation: Left; Location: Foot   Pain Onset/Description Onset: Ongoing; Descriptor: Aching   Patient's Stated Pain Goal No pain   Hospital Pain Intervention(s)   (waiting on meds)   Restrictions/Precautions   LLE Weight Bearing Per Order NWB   Other Precautions WBS; Fall Risk;Pain   General   Chart Reviewed Yes   Family/Caregiver Present No   Cognition   Overall Cognitive Status WFL   Arousal/Participation Alert; Cooperative   Attention Within functional limits   Following Commands Follows one step commands without difficulty   Subjective   Subjective i walked earlier to the BR and around the bed - I had to move that chair ( pt strongly advised to not walk alone and to not move furniture( question validity of pt's statement vs amb on L foot)   Bed Mobility   Supine to Sit 6  Modified independent   Transfers   Sit to Stand   (CG to min A to maintain NWB on L)   Stand to Sit   (cg jodee min A to maintain NWB on L)   Stand pivot 4  Minimal assistance   Additional items Assist x 1   Additional Comments pt stood with RW to focus on bracing UEs to off load R LE( x 10 with very min clearance of foot   Ambulation/Elevation   Gait pattern R Foot drag;Poor UE support;Decreased foot clearance; Short stride  (pt rushes and slides R foot - right out of slipper)   Gait Assistance 4  Minimal assist   Additional items Assist x 1   Assistive Device Rolling walker   Distance 10',5'   Balance   Static Standing Fair -   Dynamic Standing Poor +   Ambulatory Poor +   Endurance Deficit   Endurance Deficit Yes   Endurance Deficit Description easiy fatigued with activity   Activity Tolerance   Activity Tolerance Patient limited by fatigue;Patient limited by pain   Exercises   Hip Abduction   (isometrics x 30)   Hip Adduction   (pillow squeeze x 30) Knee AROM Long Arc Quad   (MRES x 30)   UE Exercise   (chair push up- R foot on floor and A to maintain NWB L x 20)   Balance training  repeated STS 5 x 2 with A to mainatin NWB on L - occ breaks 2* muscle fatigue and increase difficulty with transfer   Assessment   Prognosis Good   Problem List Decreased strength;Decreased endurance;Decreased mobility;Pain   Assessment Pt reports that he alone, walked to the BTR then around bed and even moved a chair out of his way,( question validity of this vs pt did walk and put LLE on the floor as pt has very limited ability to amb during therapy session  Pt has poor clearance of R foot with amb and slide out of slipper  Needs cont cues and A to maintain NWB status with STS   Pt reports that he has tried and kneeling awalker ( scooter ) but too painful for r knee   Barriers to Discharge Inaccessible home environment;Decreased caregiver support   Goals   Patient Goals keep getting stronger   STG Expiration Date 01/01/20   PT Treatment Day 3   Plan   Treatment/Interventions   (cont as per POC)   Progress Progressing toward goals   PT Frequency   (3-5x/wk)   Recommendation   PT Discharge Recommendation Post-Acute Rehabilitation Services

## 2020-12-30 NOTE — PROGRESS NOTES
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 - Gonzalez 3  - s/p partial 5th ray resection (DOS 2020)  2  Left foot cellulitis  3  MSSA bacteremia   4  Insulin Dependent Diabetes mellitus  5  Coronary arterial disease  6  Peripheral arterial disease  7  Hypertension  8  Chronic Kidney Disease stage 4         PLAN:    · Planned to go to OR 2020 for revisional left 5th ray resection  · NPO after midnight  · Will continue NWB to LLE  · intraoperative pathology shows residual OM of the 5th metatarsal  · Continue cefepime/flagyl per ID  · Will require rehab placement post operatively       SUBJECTIVE:     The patient was seen, evaluated, and assessed at bedside today  The patient was awake, alert, and in no acute distress  No acute events overnight  The patient reports mild pain this morning  He has attempted to keep his leg elevated  Patient denies N/V/F/chills/SOB/CP  OBJECTIVE:     Vitals:   /67 (BP Location: Right arm)   Pulse 59   Temp 97 9 °F (36 6 °C) (Temporal)   Resp 18   Wt 106 kg (233 lb 11 oz)   SpO2 97%   BMI 28 45 kg/m²     Temp (24hrs), Av 5 °F (36 4 °C), Min:97 2 °F (36 2 °C), Max:97 9 °F (36 6 °C)      Physical Exam :     General:  Alert, cooperative, and in no distress  Lower extremity exam:  Cardiovascular status at baseline  Neurological status at baseline  Musculoskeletal status at baseline  No calf tenderness noted bilaterally  Majority of incision well coapted with sutures intact  Area of dehiscence noted central dorsal incision without exposed bone  No surrounding erythema or fluctuance noted  Clinical Images 20:           Additional Data:     Labs:    Results from last 7 days   Lab Units 12/30/20  0503  12/25/20  0550   WBC Thousand/uL 8 40   < > 8 10   HEMOGLOBIN g/dL 9 7*   < > 8 8*   HEMATOCRIT % 30 1*   < > 27 5*   PLATELETS Thousands/uL 228   < > 166   NEUTROS PCT %  --   --  66   LYMPHS PCT %  --   --  19   LYMPHO PCT % 23  --   --    MONOS PCT %  --   --  11   MONO PCT % 7  --   --    EOS PCT % 2  --  2    < > = values in this interval not displayed  Results from last 7 days   Lab Units 12/30/20  0503 12/28/20  0507   POTASSIUM mmol/L 4 6 4 3   CHLORIDE mmol/L 103 104   CO2 mmol/L 29 26   BUN mg/dL 36* 28*   CREATININE mg/dL 2 28* 2 13*   CALCIUM mg/dL 8 5 8 6   ALK PHOS U/L  --  116   ALT U/L  --  29   AST U/L  --  25           * I Have Reviewed All Lab Data Listed Above  Recent Cultures (last 7 days):               Imaging: I have personally reviewed pertinent films in PACS  Pathology, and Other Studies: I have personally reviewed pertinent reports  ** Please Note: Portions of the record may have been created with voice recognition software  Occasional wrong word or "sound a like" substitutions may have occurred due to the inherent limitations of voice recognition software  Read the chart carefully and recognize, using context, where substitutions have occurred   **

## 2020-12-30 NOTE — PROGRESS NOTES
Progress Note - Yancy Salinas 1945, 66 y o  male MRN: [id number]    Unit/Bed#: E5 -01 Encounter: [id number]    Primary Care Provider: Austin Murillo MD   Date and time admitted to hospital: 12/18/2020 11:52 AM        * Subacute osteomyelitis of left foot ([facility] )  Assessment & Plan  · Management per primary service    - s/p RAY RESECTION FOOT (Left) by Podiatry 12/21/2020  - Back to OR 12/31/20 for left 5th metatarsal resection due to + margins    MSSA bacteremia  Assessment & Plan  · Related to foot infection  · WIthout endocarditis with rapid clearance  · Management per ID    -  IV cefepime until 1/4/21 per ID (14 days total)    Type 2 diabetes mellitus, with long-term current use of insulin [facility])  Assessment & Plan  Lab Results   Component Value Date    HGBA1C 7 4 (H) 12/18/2020     Recent Labs     12/29/20  2110 12/30/20  0703 12/30/20  1127 12/30/20  1457   POCGLU 142* 98 116 156*   A1c at goal  Blood sugar stable  Perioperative blood sugar goal less than 180  Currently off Lantus and just on sliding scale insulin    Acute kidney injury superimposed on chronic kidney disease ([facility] )  Assessment & Plan  GARY on CKD stage IV  GARY now resolved after IV fluid    Hypertension with renal disease  Assessment & Plan  Continue metoprolol succinate 100 mg daily and imdur 90 mg daily      Anemia of chronic disease  Assessment & Plan  Stable  Transfuse as needed    CAD (coronary artery disease)  Assessment & Plan  Stable  Continue  aspirin 81 mg daily, metoprolol succinate 100 mg daily, and Imdur 90 mg daily      VTE Pharmacologic Prophylaxis:   Pharmacologic: Heparin  Mechanical VTE Prophylaxis in Place: No      Discussions with Specialists or Other Care Team Provider:  Hospital course reviewed    Time Spent for Care: 20 minutes  More than 50% of total time spent on counseling and coordination of care as described above      Current Length of Stay: 12 day(s)    Current Patient Status: Inpatient     Discharge Plan:  Per primary service    Code Status: Level 1 - Full Code      Subjective:   Denies nausea vomiting or abdominal pain  He is anticipating surgery tomorrow  Denies hypogastric pain or difficulty urinating    Objective:     Vitals:   Temp (24hrs), Av 6 °F (36 4 °C), Min:97 2 °F (36 2 °C), Max:97 9 °F (36 6 °C)    Temp:  [97 2 °F (36 2 °C)-97 9 °F (36 6 °C)] 97 9 °F (36 6 °C)  HR:  [54-70] 54  Resp:  [18] 18  BP: (120-147)/(67-88) 120/79  SpO2:  [97 %-100 %] 100 %  Body mass index is 28 45 kg/m²  Input and Output Summary (last 24 hours): Intake/Output Summary (Last 24 hours) at 2020 1522  Last data filed at 2020 1300  Gross per 24 hour   Intake 600 ml   Output 250 ml   Net 350 ml       Physical Exam:     Physical Exam  Constitutional:       Appearance: He is not ill-appearing or diaphoretic  HENT:      Head: Normocephalic and atraumatic  Nose: No rhinorrhea  Eyes:      General: No scleral icterus  Neck:      Musculoskeletal: Neck supple  Cardiovascular:      Rate and Rhythm: Regular rhythm  Heart sounds: No murmur  No gallop  Pulmonary:      Effort: Pulmonary effort is normal  No respiratory distress  Breath sounds: No wheezing or rales  Abdominal:      General: Abdomen is flat  Palpations: Abdomen is soft  Musculoskeletal:         General: Deformity present  Comments: Right upper extremity PICC   Skin:     General: Skin is warm and dry  Neurological:      Mental Status: He is alert           Additional Data:     Labs:    Results from last 7 days   Lab Units 20  0503  20  0550   WBC Thousand/uL 8 40   < > 8 10   HEMOGLOBIN g/dL 9 7*   < > 8 8*   HEMATOCRIT % 30 1*   < > 27 5*   PLATELETS Thousands/uL 228   < > 166   BANDS PCT % 2  --   --    NEUTROS PCT %  --   --  66   LYMPHS PCT %  --   --  19   LYMPHO PCT % 23  --   --    MONOS PCT %  --   --  11   MONO PCT % 7  --   --    EOS PCT % 2  --  2    < > = values in this interval not displayed  Results from last 7 days   Lab Units 12/30/20  0503 12/28/20  0507   SODIUM mmol/L 135* 137   POTASSIUM mmol/L 4 6 4 3   CHLORIDE mmol/L 103 104   CO2 mmol/L 29 26   BUN mg/dL 36* 28*   CREATININE mg/dL 2 28* 2 13*   ANION GAP mmol/L 3* 7   CALCIUM mg/dL 8 5 8 6   ALBUMIN g/dL  --  2 7*   TOTAL BILIRUBIN mg/dL  --  0 23   ALK PHOS U/L  --  116   ALT U/L  --  29   AST U/L  --  25   GLUCOSE RANDOM mg/dL 123 99         Results from last 7 days   Lab Units 12/30/20  1457 12/30/20  1127 12/30/20  0703 12/29/20  2110 12/29/20  1547 12/29/20  1118 12/29/20  0740 12/28/20  2044 12/28/20  1621 12/28/20  1108 12/28/20  0804 12/27/20  2058   POC GLUCOSE mg/dl 156* 116 98 142* 110 119 89 151* 149* 101 156* 174*                   * I Have Reviewed All Lab Data Listed Above  * Additional Pertinent Lab Tests Reviewed:  All Labs Within Last 24 Hours Reviewed    Imaging:    Imaging Reports Reviewed Today Include:  Chest x-ray after placement of central line    Recent Cultures (last 7 days):           Last 24 Hours Medication List:   Current Facility-Administered Medications   Medication Dose Route Frequency Provider Last Rate    acetaminophen  975 mg Oral Q6H [facility] Mary Route, DPM      ALPRAZolam  0 25 mg Oral HS PRN Mary Route, DPM      aspirin  81 mg Oral Daily Mary Route, DPM      cefepime  2,000 mg Intravenous Q12H Mary Route, DPM 2,000 mg (12/30/20 1210)    fluticasone  1 spray Each Nare Daily Mary Route, DPM      gabapentin  600 mg Oral Daily Mary Route, DPM      heparin (porcine)  5,000 Units Subcutaneous Q12H [facility] Mary Route, DPM      insulin lispro  1-5 Units Subcutaneous 4x Daily (AC & HS) Mary Route, DPM      isosorbide mononitrate  90 mg Oral Daily Mary Route, DPM      latanoprost  1 drop Both Eyes HS Mary Route, DPM      loperamide  2 mg Oral TID PRN Judeth Goodpasture, PA-C      melatonin  3 mg Oral HS PRN Mary Route, DPM      metoprolol succinate  100 mg Oral Daily Vita Lacey DPM      ondansetron  4 mg Intravenous Q6H PRN Vita Lacey DPM      oxyCODONE  5 mg Oral Q6H PRN Vita Lacey DPM      pantoprazole  40 mg Oral Early Morning Vita Lacey DPM      sertraline  50 mg Oral Daily Matthew Carlos      [START ON 12/31/2020] sodium chloride  75 mL/hr Intravenous Continuous Vita Lacey DPM      tamsulosin  0 4 mg Oral HS Vita Lacey DPM      traMADol  50 mg Oral Q12H PRN Vita Lacey DPM          Today, Patient Was Seen By: Pelon Norwood MD    ** Please Note: Dictation voice to text software may have been used in the creation of this document   **

## 2020-12-30 NOTE — PROGRESS NOTES
Progress Note - Infectious Disease   Lana Ramos 66 y o  male MRN: [id number]  Unit/Bed#: E5 -01 Encounter: [id number]    Impression/Plan:  1  MSSA bacteremia  With only 1 of 2 sets positive   While this could represent a contaminant, the patient had the same organism isolated in the foot wound which had been worsening  Therefore I suspect this is more of a transient bacteremia   TTE was negative for valvular vegetation   Fortunately the patient is not systemically ill and has remained hemodynamically stable   His repeat blood cultures were negative after five days  Today he is afebrile  His WBC count has been normal   -antibiotic as below  -monitor CBC and BMP  -monitor vitals     2  Left foot osteomyelitis   Primarily involving the 5th metatarsal but also the phalanx   Infection appeared to be polymicrobial although I suspect that MSSA is the primary invasive organism  Patient is now status post 5th ray amputation   Unfortunately his bone margin remained positive for osteomyelitis and he will need to return to the OR tomorrow for additional resection  He is receiving IV cefepime which he is tolerating without difficulty  Will await to new surgical outcome to determine final antibiotic choice and duration   -continue IV cefepime for now  -monitor CBC and BMP  -monitor vitals  -serial left foot exams  -local wound care per Podiatry  -continue close follow-up with Podiatry     3   Chronic kidney disease-advanced   Renal function has been waxing and waning   Possibly a pre renal issue  Patient's creatinine is 2 28 this morning   -monitor creatinine  -dose adjust antibiotics for renal function as needed  -volume management per SLIM  -avoid nephrotoxins     4  Chronic dysuria  With positive urine culture for BCG as expected post treatment for bladder cancer   The patient is now status post biopsy with mild chronic inflammation but AFB smears negative for invasive disease   AFB Culture from the biopsies negative thus far at 4 week  A new repeat urine AFB culture is now pending   -follow-up AFB cultures  -ongoing follow-up with outpatient Urology and Infectious Disease after discharge    Above plan was discussed in detail with patient at bedside  Antibiotics:  Cefepime 11  Antibiotics 13  Postop #9    Subjective:  Patient reports no changes overnight  Still has some throbbing in the left foot but not worse than yesterday  Understands that he will be returning to the OR tomorrow for additional bone resection  He has no fever, chills, sweats, shakes; no nausea, vomiting, abdominal pain, or dysuria; diarrhea continues to improve in frequency and consistency; no cough, shortness of breath, or chest pain  No new symptoms  Objective:  Vitals:  Temp:  [97 2 °F (36 2 °C)-98 °F (36 7 °C)] 97 2 °F (36 2 °C)  HR:  [65-70] 65  Resp:  [18] 18  BP: (139-147)/(74-88) 147/88  SpO2:  [97 %-99 %] 99 %  Temp (24hrs), Av 5 °F (36 4 °C), Min:97 2 °F (36 2 °C), Max:98 °F (36 7 °C)  Current: Temperature: (!) 97 2 °F (36 2 °C)    Physical Exam:   General Appearance:  Alert, interactive, nontoxic, no acute distress  He appears comfortable sitting out of bed in his chair  Throat: Oropharynx moist without lesions  Lungs:   Clear to auscultation bilaterally; no wheezes, rhonchi or rales; respirations unlabored; patient is on room air  Heart:  RRR; no murmur, rub or gallop   Abdomen:   Soft, non-tender, non-distended, positive bowel sounds  Extremities: No clubbing or cyanosis; left foot dressing is clean/dry/intact, no breakthrough drainage   Skin: No new rashes noted on exposed skin       Labs, Imaging, & Other studies:   All pertinent labs and imaging studies were personally reviewed  Results from last 7 days   Lab Units 20  0503 20  0507 20  0538   WBC Thousand/uL 8 40 8 34 8 22   HEMOGLOBIN g/dL 9 7* 9 4* 9 2*   PLATELETS Thousands/uL 228 199 184     Results from last 7 days   Lab Units 20  0503 12/28/20  0507   POTASSIUM mmol/L 4 6 4 3   CHLORIDE mmol/L 103 104   CO2 mmol/L 29 26   BUN mg/dL 36* 28*   CREATININE mg/dL 2 28* 2 13*   EGFR ml/min/1 73sq m 31 33   CALCIUM mg/dL 8 5 8 6   AST U/L  --  25   ALT U/L  --  29   ALK PHOS U/L  --  116

## 2020-12-30 NOTE — ASSESSMENT & PLAN NOTE
· Management per primary service    - s/p RAY RESECTION FOOT (Left) by Podiatry 12/21/2020  - Back to OR 12/31/20 for left 5th metatarsal resection due to + margins

## 2020-12-30 NOTE — PLAN OF CARE
Problem: Potential for Falls  Goal: Patient will remain free of falls  Description: INTERVENTIONS:  - Assess patient frequently for physical needs  -  Identify cognitive and physical deficits and behaviors that affect risk of falls    -  [place] fall precautions as indicated by assessment   - Educate patient/family on patient safety including physical limitations  - Instruct patient to call for assistance with activity based on assessment  - Modify environment to reduce risk of injury  - Consider OT/PT consult to assist with strengthening/mobility  Outcome: Progressing     Problem: MUSCULOSKELETAL - ADULT  Goal: Maintain or return mobility to safest level of function  Description: INTERVENTIONS:  - Assess patient's ability to carry out ADLs; assess patient's baseline for ADL function and identify physical deficits which impact ability to perform ADLs (bathing, care of mouth/teeth, toileting, grooming, dressing, etc )  - Assess/evaluate cause of self-care deficits   - Assess range of motion  - Assess patient's mobility  - Assess patient's need for assistive devices and provide as appropriate  - Encourage maximum independence but intervene and supervise when necessary  - Involve family in performance of ADLs  - Assess for home care needs following discharge   - Consider OT consult to assist with ADL evaluation and planning for discharge  - Provide patient education as appropriate  Outcome: Progressing  Goal: Maintain proper alignment of affected body part  Description: INTERVENTIONS:  - Support, maintain and protect limb and body alignment  - Provide patient/ family with appropriate education  Outcome: Progressing     Problem: PAIN - ADULT  Goal: Verbalizes/displays adequate comfort level or baseline comfort level  Description: Interventions:  - Encourage patient to monitor pain and request assistance  - Assess pain using appropriate pain scale  - Administer analgesics based on type and severity of pain and evaluate response  - Implement non-pharmacological measures as appropriate and evaluate response  - Consider cultural and social influences on pain and pain management  - Notify physician/advanced practitioner if interventions unsuccessful or patient reports new pain  Outcome: Progressing     Problem: INFECTION - ADULT  Goal: Absence or prevention of progression during hospitalization  Description: INTERVENTIONS:  - Assess and monitor for signs and symptoms of infection  - Monitor lab/diagnostic results  - Monitor all insertion sites, i e  indwelling lines, tubes, and drains  - Monitor endotracheal if appropriate and nasal secretions for changes in amount and color  - [place] appropriate cooling/warming therapies per order  - Administer medications as ordered  - Instruct and encourage patient and family to use good hand hygiene technique  - Identify and instruct in appropriate isolation precautions for identified infection/condition  Outcome: Progressing  Goal: Absence of fever/infection during neutropenic period  Description: INTERVENTIONS:  - Monitor WBC    Outcome: Progressing     Problem: SAFETY ADULT  Goal: Patient will remain free of falls  Description: INTERVENTIONS:  - Assess patient frequently for physical needs  -  Identify cognitive and physical deficits and behaviors that affect risk of falls    -  [place] fall precautions as indicated by assessment   - Educate patient/family on patient safety including physical limitations  - Instruct patient to call for assistance with activity based on assessment  - Modify environment to reduce risk of injury  - Consider OT/PT consult to assist with strengthening/mobility  Outcome: Progressing  Goal: Maintain or return to baseline ADL function  Description: INTERVENTIONS:  -  Assess patient's ability to carry out ADLs; assess patient's baseline for ADL function and identify physical deficits which impact ability to perform ADLs (bathing, care of mouth/teeth, toileting, grooming, dressing, etc )  - Assess/evaluate cause of self-care deficits   - Assess range of motion  - Assess patient's mobility; develop plan if impaired  - Assess patient's need for assistive devices and provide as appropriate  - Encourage maximum independence but intervene and supervise when necessary  - Involve family in performance of ADLs  - Assess for home care needs following discharge   - Consider OT consult to assist with ADL evaluation and planning for discharge  - Provide patient education as appropriate  Outcome: Progressing  Goal: Maintain or return mobility status to optimal level  Description: INTERVENTIONS:  - Assess patient's baseline mobility status (ambulation, transfers, stairs, etc )    - Identify cognitive and physical deficits and behaviors that affect mobility  - Identify mobility aids required to assist with transfers and/or ambulation (gait belt, sit-to-stand, lift, walker, cane, etc )  - [place] fall precautions as indicated by assessment  - Record patient progress and toleration of activity level on Mobility SBAR; progress patient to next Phase/Stage  - Instruct patient to call for assistance with activity based on assessment  - Consider rehabilitation consult to assist with strengthening/weightbearing, etc   Outcome: Progressing     Problem: DISCHARGE PLANNING  Goal: Discharge to home or other facility with appropriate resources  Description: INTERVENTIONS:  - Identify barriers to discharge w/patient and caregiver  - Arrange for needed discharge resources and transportation as appropriate  - Identify discharge learning needs (meds, wound care, etc )  - Arrange for interpretive services to assist at discharge as needed  - Refer to Case Management Department for coordinating discharge planning if the patient needs post-hospital services based on physician/advanced practitioner order or complex needs related to functional status, cognitive ability, or social support system  Outcome: Progressing     Problem: Knowledge Deficit  Goal: Patient/family/caregiver demonstrates understanding of disease process, treatment plan, medications, and discharge instructions  Description: Complete learning assessment and assess knowledge base    Interventions:  - Provide teaching at level of understanding  - Provide teaching via preferred learning methods  Outcome: Progressing

## 2020-12-30 NOTE — ASSESSMENT & PLAN NOTE
· Related to foot infection    · WIthout endocarditis with rapid clearance  · Management per ID    -  IV cefepime until 1/4/21 per ID (14 days total)

## 2020-12-30 NOTE — PLAN OF CARE
Problem: PHYSICAL THERAPY ADULT  Goal: Performs mobility at highest level of function for planned discharge setting  See evaluation for individualized goals  Description: Treatment/Interventions: Functional transfer training, LE strengthening/ROM, Elevations, Therapeutic exercise, Endurance training, Patient/family training, Equipment eval/education, Bed mobility, Gait training, Spoke to nursing, Spoke to case management, OT  Equipment Recommended: Yesenia Neal       See flowsheet documentation for full assessment, interventions and recommendations  Outcome: Progressing  Note: Prognosis: Good  Problem List: Decreased strength, Decreased endurance, Decreased mobility, Pain  Assessment: Pt reports that he alone, walked to the BTR then around bed and even moved a chair out of his way,( question validity of this vs pt did walk and put LLE on the floor as pt has very limited ability to amb during therapy session  Pt has poor clearance of R foot with amb and slide out of slipper  Needs cont cues and A to maintain NWB status with STS  Pt reports that he has tried and kneeling awalker ( scooter ) but too painful for r knee  Barriers to Discharge: Inaccessible home environment, Decreased caregiver support  Barriers to Discharge Comments: reports that wife cannot help very much  PT Discharge Recommendation: Post-Acute Rehabilitation Services     PT - OK to Discharge: Yes(to rehab when medically ready)    See flowsheet documentation for full assessment

## 2020-12-31 ENCOUNTER — APPOINTMENT (INPATIENT)
Dept: RADIOLOGY | Facility: HOSPITAL | Age: 75
DRG: 617 | End: 2020-12-31
Payer: MEDICARE

## 2020-12-31 ENCOUNTER — ANESTHESIA (INPATIENT)
Dept: PERIOP | Facility: HOSPITAL | Age: 75
DRG: 617 | End: 2020-12-31
Payer: MEDICARE

## 2020-12-31 VITALS — HEART RATE: 57 BPM

## 2020-12-31 LAB
GLUCOSE SERPL-MCNC: 100 MG/DL (ref 65–140)
GLUCOSE SERPL-MCNC: 113 MG/DL (ref 65–140)
GLUCOSE SERPL-MCNC: 118 MG/DL (ref 65–140)
GLUCOSE SERPL-MCNC: 124 MG/DL (ref 65–140)
GLUCOSE SERPL-MCNC: 242 MG/DL (ref 65–140)

## 2020-12-31 PROCEDURE — 97116 GAIT TRAINING THERAPY: CPT

## 2020-12-31 PROCEDURE — 88311 DECALCIFY TISSUE: CPT | Performed by: PATHOLOGY

## 2020-12-31 PROCEDURE — 73630 X-RAY EXAM OF FOOT: CPT

## 2020-12-31 PROCEDURE — 99232 SBSQ HOSP IP/OBS MODERATE 35: CPT | Performed by: INTERNAL MEDICINE

## 2020-12-31 PROCEDURE — 82948 REAGENT STRIP/BLOOD GLUCOSE: CPT

## 2020-12-31 PROCEDURE — 88305 TISSUE EXAM BY PATHOLOGIST: CPT | Performed by: PATHOLOGY

## 2020-12-31 PROCEDURE — 0Y6N0ZF DETACHMENT AT LEFT FOOT, PARTIAL 5TH RAY, OPEN APPROACH: ICD-10-PCS | Performed by: PODIATRIST

## 2020-12-31 PROCEDURE — 99233 SBSQ HOSP IP/OBS HIGH 50: CPT | Performed by: INTERNAL MEDICINE

## 2020-12-31 PROCEDURE — 97110 THERAPEUTIC EXERCISES: CPT

## 2020-12-31 RX ORDER — MIDAZOLAM HYDROCHLORIDE 2 MG/2ML
INJECTION, SOLUTION INTRAMUSCULAR; INTRAVENOUS AS NEEDED
Status: DISCONTINUED | OUTPATIENT
Start: 2020-12-31 | End: 2020-12-31

## 2020-12-31 RX ORDER — PROPOFOL 10 MG/ML
INJECTION, EMULSION INTRAVENOUS CONTINUOUS PRN
Status: DISCONTINUED | OUTPATIENT
Start: 2020-12-31 | End: 2020-12-31

## 2020-12-31 RX ORDER — ONDANSETRON 2 MG/ML
4 INJECTION INTRAMUSCULAR; INTRAVENOUS EVERY 6 HOURS PRN
Status: DISCONTINUED | OUTPATIENT
Start: 2020-12-31 | End: 2020-12-31 | Stop reason: HOSPADM

## 2020-12-31 RX ORDER — FENTANYL CITRATE/PF 50 MCG/ML
50 SYRINGE (ML) INJECTION
Status: DISCONTINUED | OUTPATIENT
Start: 2020-12-31 | End: 2020-12-31 | Stop reason: HOSPADM

## 2020-12-31 RX ORDER — MAGNESIUM HYDROXIDE 1200 MG/15ML
LIQUID ORAL AS NEEDED
Status: DISCONTINUED | OUTPATIENT
Start: 2020-12-31 | End: 2020-12-31 | Stop reason: HOSPADM

## 2020-12-31 RX ADMIN — ACETAMINOPHEN 975 MG: 325 TABLET ORAL at 17:36

## 2020-12-31 RX ADMIN — SODIUM CHLORIDE: 0.9 INJECTION, SOLUTION INTRAVENOUS at 13:16

## 2020-12-31 RX ADMIN — PROPOFOL 100 MCG/KG/MIN: 10 INJECTION, EMULSION INTRAVENOUS at 13:07

## 2020-12-31 RX ADMIN — TAMSULOSIN HYDROCHLORIDE 0.4 MG: 0.4 CAPSULE ORAL at 21:56

## 2020-12-31 RX ADMIN — GABAPENTIN 600 MG: 300 CAPSULE ORAL at 08:52

## 2020-12-31 RX ADMIN — ISOSORBIDE MONONITRATE 90 MG: 30 TABLET, EXTENDED RELEASE ORAL at 08:50

## 2020-12-31 RX ADMIN — MIDAZOLAM 2 MG: 1 INJECTION INTRAMUSCULAR; INTRAVENOUS at 12:59

## 2020-12-31 RX ADMIN — SERTRALINE HYDROCHLORIDE 50 MG: 50 TABLET ORAL at 08:52

## 2020-12-31 RX ADMIN — CEFEPIME HYDROCHLORIDE 2000 MG: 2 INJECTION, POWDER, FOR SOLUTION INTRAVENOUS at 23:46

## 2020-12-31 RX ADMIN — ACETAMINOPHEN 975 MG: 325 TABLET ORAL at 05:11

## 2020-12-31 RX ADMIN — METOPROLOL SUCCINATE 100 MG: 50 TABLET, EXTENDED RELEASE ORAL at 08:51

## 2020-12-31 RX ADMIN — OXYCODONE HYDROCHLORIDE 5 MG: 5 TABLET ORAL at 17:38

## 2020-12-31 RX ADMIN — OXYCODONE HYDROCHLORIDE 5 MG: 5 TABLET ORAL at 23:52

## 2020-12-31 RX ADMIN — HEPARIN SODIUM 5000 UNITS: 5000 INJECTION INTRAVENOUS; SUBCUTANEOUS at 21:56

## 2020-12-31 RX ADMIN — LATANOPROST 1 DROP: 50 SOLUTION OPHTHALMIC at 21:56

## 2020-12-31 RX ADMIN — PANTOPRAZOLE SODIUM 40 MG: 40 TABLET, DELAYED RELEASE ORAL at 05:11

## 2020-12-31 RX ADMIN — TRAMADOL HYDROCHLORIDE 50 MG: 50 TABLET, FILM COATED ORAL at 22:00

## 2020-12-31 RX ADMIN — ACETAMINOPHEN 975 MG: 325 TABLET ORAL at 23:46

## 2020-12-31 RX ADMIN — INSULIN LISPRO 2 UNITS: 100 INJECTION, SOLUTION INTRAVENOUS; SUBCUTANEOUS at 17:28

## 2020-12-31 RX ADMIN — CEFEPIME HYDROCHLORIDE 2000 MG: 2 INJECTION, POWDER, FOR SOLUTION INTRAVENOUS at 12:24

## 2020-12-31 NOTE — ASSESSMENT & PLAN NOTE
Lab Results   Component Value Date    HGBA1C 7 4 (H) 12/18/2020     Recent Labs     12/30/20  1457 12/30/20  2031 12/31/20  0703 12/31/20  1109   POCGLU 156* 169* 100 118   A1c at goal  Blood sugar stable  Perioperative blood sugar goal less than 180  Currently off Lantus and just on sliding scale insulin

## 2020-12-31 NOTE — ASSESSMENT & PLAN NOTE
· Management per primary service    - s/p RAY RESECTION FOOT (Left) by Podiatry 12/21/2020  - Back to OR today for left 5th metatarsal resection due to + margins

## 2020-12-31 NOTE — PROGRESS NOTES
Progress Note - Stevo Serrano 1945, 66 y o  male MRN: [id number]    Unit/Bed#: E5 -01 Encounter: [id number]    Primary Care Provider: Aliyah Mccarthy MD   Date and time admitted to hospital: 12/18/2020 11:52 AM        * Subacute osteomyelitis of left foot ([facility] )  Assessment & Plan  · Management per primary service    - s/p RAY RESECTION FOOT (Left) by Podiatry 12/21/2020  - Back to OR today for left 5th metatarsal resection due to + margins    MSSA bacteremia  Assessment & Plan  · Related to foot infection  · WIthout endocarditis with rapid clearance  · Management per ID    -  IV cefepime until 1/4/21 per ID (14 days total)    Type 2 diabetes mellitus, with long-term current use of insulin [facility])  Assessment & Plan  Lab Results   Component Value Date    HGBA1C 7 4 (H) 12/18/2020     Recent Labs     12/30/20  1457 12/30/20  2031 12/31/20  0703 12/31/20  1109   POCGLU 156* 169* 100 118   A1c at goal  Blood sugar stable  Perioperative blood sugar goal less than 180  Currently off Lantus and just on sliding scale insulin    Acute kidney injury superimposed on chronic kidney disease ([facility] )  Assessment & Plan  GARY on CKD stage IV  GARY now resolved after IV fluid    Hypertension with renal disease  Assessment & Plan  Continue metoprolol succinate 100 mg daily and imdur 90 mg daily      Anemia of chronic disease  Assessment & Plan  Stable  Transfuse as needed    CAD (coronary artery disease)  Assessment & Plan  Stable  Okay to proceed with planned procedure today  Continue  aspirin 81 mg daily, metoprolol succinate 100 mg daily, and Imdur 90 mg daily    VTE Pharmacologic Prophylaxis:   Pharmacologic: Heparin  Mechanical VTE Prophylaxis in Place: No      Time Spent for Care: 20 minutes  More than 50% of total time spent on counseling and coordination of care as described above      Current Length of Stay: 13 day(s)    Current Patient Status: Inpatient     Code Status: Level 1 - Full Code      Subjective: Patient seen and examined prior to his procedure  He reported feeling well and in good spirits and ready for his procedure  He denied shortness of breath or chest pain      Objective:     Vitals:   Temp (24hrs), Av 6 °F (36 4 °C), Min:97 1 °F (36 2 °C), Max:97 9 °F (36 6 °C)    Temp:  [97 1 °F (36 2 °C)-97 9 °F (36 6 °C)] 97 8 °F (36 6 °C)  HR:  [54-66] 66  Resp:  [18] 18  BP: (120-168)/(77-80) 168/80  SpO2:  [98 %-100 %] 98 %  Body mass index is 27 69 kg/m²  Input and Output Summary (last 24 hours): Intake/Output Summary (Last 24 hours) at 2020 1238  Last data filed at 2020 0900  Gross per 24 hour   Intake 360 ml   Output 1200 ml   Net -840 ml       Physical Exam:     Physical Exam  Constitutional:       Appearance: He is not ill-appearing or diaphoretic  HENT:      Head: Normocephalic and atraumatic  Nose: No rhinorrhea  Eyes:      General: No scleral icterus  Neck:      Musculoskeletal: Neck supple  Cardiovascular:      Rate and Rhythm: Regular rhythm  Heart sounds: No murmur  No gallop  Pulmonary:      Effort: Pulmonary effort is normal  No respiratory distress  Breath sounds: No wheezing or rales  Abdominal:      General: Abdomen is flat  Palpations: Abdomen is soft  Musculoskeletal:         General: Deformity present  Right lower leg: No edema  Left lower leg: No edema  Skin:     General: Skin is warm and dry  Neurological:      Mental Status: He is alert and oriented to person, place, and time  Mental status is at baseline     Psychiatric:         Mood and Affect: Mood normal          Behavior: Behavior normal      Additional Data:     Labs:    Results from last 7 days   Lab Units 20  0503  20  0550   WBC Thousand/uL 8 40   < > 8 10   HEMOGLOBIN g/dL 9 7*   < > 8 8*   HEMATOCRIT % 30 1*   < > 27 5*   PLATELETS Thousands/uL 228   < > 166   BANDS PCT % 2  --   --    NEUTROS PCT %  --   --  66   LYMPHS PCT %  --   --  19 LYMPHO PCT % 23  --   --    MONOS PCT %  --   --  11   MONO PCT % 7  --   --    EOS PCT % 2  --  2    < > = values in this interval not displayed  Results from last 7 days   Lab Units 12/30/20  0503 12/28/20  0507   SODIUM mmol/L 135* 137   POTASSIUM mmol/L 4 6 4 3   CHLORIDE mmol/L 103 104   CO2 mmol/L 29 26   BUN mg/dL 36* 28*   CREATININE mg/dL 2 28* 2 13*   ANION GAP mmol/L 3* 7   CALCIUM mg/dL 8 5 8 6   ALBUMIN g/dL  --  2 7*   TOTAL BILIRUBIN mg/dL  --  0 23   ALK PHOS U/L  --  116   ALT U/L  --  29   AST U/L  --  25   GLUCOSE RANDOM mg/dL 123 99         Results from last 7 days   Lab Units 12/31/20  1109 12/31/20  0703 12/30/20  2031 12/30/20  1457 12/30/20  1127 12/30/20  0703 12/29/20  2110 12/29/20  1547 12/29/20  1118 12/29/20  0740 12/28/20  2044 12/28/20  1621   POC GLUCOSE mg/dl 118 100 169* 156* 116 98 142* 110 119 89 151* 149*                   * I Have Reviewed All Lab Data Listed Above  * Additional Pertinent Lab Tests Reviewed:  All Labs Within Last 24 Hours Reviewed    Imaging:    Imaging Reports Reviewed Today Include:  None      Recent Cultures (last 7 days):           Last 24 Hours Medication List:   Current Facility-Administered Medications   Medication Dose Route Frequency Provider Last Rate    acetaminophen  975 mg Oral Q6H [address] Gilbert Serge, DPM      ALPRAZolam  0 25 mg Oral HS PRN Gilbert Serge, DPM      aspirin  81 mg Oral Daily Gilbert Serge, DPM      cefepime  2,000 mg Intravenous Q12H Gilbert Serge, DPM 2,000 mg (12/31/20 1224)    fluticasone  1 spray Each Nare Daily Gilbert Serge, DPM      gabapentin  600 mg Oral Daily Gilbert Serge, DPM      heparin (porcine)  5,000 Units Subcutaneous Q12H [address] Gilbert Serge, DPM      insulin lispro  1-5 Units Subcutaneous 4x Daily (AC & HS) Gilbert Serge, DPM      isosorbide mononitrate  90 mg Oral Daily Fei Chaudhry, KRISTOFERM      latanoprost  1 drop Both Eyes HS Fei Chaudhry, KRISTOFERM      loperamide  2 mg Oral TID PRN [facility], BO  melatonin  3 mg Oral HS PRN Chaz [place], DPM      metoprolol succinate  100 mg Oral Daily [place], DPM      ondansetron  4 mg Intravenous Q6H PRN Chaz [place], DPM      oxyCODONE  5 mg Oral Q6H PRN Chaz [place], DPM      pantoprazole  40 mg Oral Early Morning Chaz [place], DPM      sertraline  50 mg Oral Daily Chaz [place], DPM      sodium chloride  75 mL/hr Intravenous Continuous Chaz [place], DPM 75 mL/hr (12/30/20 2350)    tamsulosin  0 4 mg Oral HS Chaz [place], DPM      traMADol  50 mg Oral Q12H PRN Chaz [place], DPM          Today, Patient Was Seen By: Krys Pena MD    ** Please Note: Dictation voice to text software may have been used in the creation of this document   **

## 2020-12-31 NOTE — OP NOTE
OPERATIVE REPORT - Podiatry  PATIENT NAME: Fran Benavides    :  1945  MRN: [id number]  Pt Location: [facility] OR ROOM 05    SURGERY DATE: 2020    Surgeon(s) and Role:     * Sarah Liu DPM - Primary     * Ananda Smith DPM - Assisting    Pre-op Diagnosis:  Acute osteomyelitis of left foot     Post-Op Diagnosis Codes:  Acute osteomyelitis of left foot     Procedure(s) (LRB):  Partial 5th metatarsal resection left foot    Specimen(s):  ID Type Source Tests Collected by Time Destination   1 : Left 5th Met Tissue Toe, Left TISSUE EXAM Sarah Liu DPM 2020 1322        Estimated Blood Loss:   Minimal    Drains:  * No LDAs found *    Anesthesia Type:   General with 10 ml of 1% Lidocaine and 0 5% Bupivacaine in a 1:1 mixture    Hemostasis:  Atraumatic surgical technique    Materials:  * No implants in log *    Operative Findings:  After removal of approximately 2 cm of 5th metatarsal leaving the 5th metatarsal base intact; the residual bone appeared to be white, hard and healthy in appearance, there was no purulence drainage or any sinus tracts or abscess  Suboptimal bleeding noted intraoperatively, healing potential remains guarded  Complications:   None    Procedure and Technique:     Under mild sedation, the patient was brought into the operating room and placed on the operating room table in the supine position  IV sedation was achieved by anesthesia team and a universal timeout was performed where all parties are in agreement of correct patient, correct procedure and correct site  A pneumatic tourniquet was then placed over the patient's left lower extremity with ample padding however it was not inflated during the procedure  A reverse Silva block was performed consisting of 10 ml of 1% Lidocaine and 0 5% Bupivacaine in a 1:1 mixture  The foot was then prepped and draped in the usual aseptic manner  All the sutures from previous surgery was removed    The previous incision site was opened and inspected  The distal aspect of previous surgical site with skin necrosis however the tissue appeared healthy and viable  There is no abscess or purulence drainage or sinus tracts noted  The 5th metatarsal appeared to be healthy in appearance and viable  Using a sagittal saw approximately 2 cm of remaining 5th metatarsal was resected leaving the 5th metatarsal base intact where the peroneus brevis tendon inserts  The residual bone was further inspected and appeared to be white, hard and healthy in appearance  The surgical site was then irrigated with copious amounts of normal sterile saline  The wound bed was further investigated and noted to be free of any purulence drainage, sinus tract and bone is noted to be healthy and viable  There was sub optimal bleeding noted  The skin edges were then reapproximated using 3-0 nylon  The surgical site was then dressed with Xeroform and dry sterile dressing  The patient tolerated the procedure and anesthesia well without immediate complications and transferred to PACU with vital signs stable  As with many limb salvage procedures, we contemplate the possibility of performing further stages to this procedure  Procedures may include debridements, delayed closure, plastic surgery techniques, or more proximal amputations  This procedure may be considered part of a multi-staged limb salvage treatment plan  Dr Oleksandr Zarate was present during the entire procedure and participated in all key aspects  SIGNATURE: Jocy Ponce DPM  DATE: December 31, 2020  TIME: 2:02 PM      Portions of the record may have been created with voice recognition software  Occasional wrong word or "sound a like" substitutions may have occurred due to the inherent limitations of voice recognition software  Read the chart carefully and recognize, using context, where substitutions have occurred

## 2020-12-31 NOTE — PLAN OF CARE
Problem: Potential for Falls  Goal: Patient will remain free of falls  Description: INTERVENTIONS:  - Assess patient frequently for physical needs  -  Identify cognitive and physical deficits and behaviors that affect risk of falls    -  [place] fall precautions as indicated by assessment   - Educate patient/family on patient safety including physical limitations  - Instruct patient to call for assistance with activity based on assessment  - Modify environment to reduce risk of injury  - Consider OT/PT consult to assist with strengthening/mobility  Outcome: Progressing     Problem: MUSCULOSKELETAL - ADULT  Goal: Maintain or return mobility to safest level of function  Description: INTERVENTIONS:  - Assess patient's ability to carry out ADLs; assess patient's baseline for ADL function and identify physical deficits which impact ability to perform ADLs (bathing, care of mouth/teeth, toileting, grooming, dressing, etc )  - Assess/evaluate cause of self-care deficits   - Assess range of motion  - Assess patient's mobility  - Assess patient's need for assistive devices and provide as appropriate  - Encourage maximum independence but intervene and supervise when necessary  - Involve family in performance of ADLs  - Assess for home care needs following discharge   - Consider OT consult to assist with ADL evaluation and planning for discharge  - Provide patient education as appropriate  Outcome: Progressing  Goal: Maintain proper alignment of affected body part  Description: INTERVENTIONS:  - Support, maintain and protect limb and body alignment  - Provide patient/ family with appropriate education  Outcome: Progressing     Problem: PAIN - ADULT  Goal: Verbalizes/displays adequate comfort level or baseline comfort level  Description: Interventions:  - Encourage patient to monitor pain and request assistance  - Assess pain using appropriate pain scale  - Administer analgesics based on type and severity of pain and evaluate response  - Implement non-pharmacological measures as appropriate and evaluate response  - Consider cultural and social influences on pain and pain management  - Notify physician/advanced practitioner if interventions unsuccessful or patient reports new pain  Outcome: Progressing     Problem: INFECTION - ADULT  Goal: Absence or prevention of progression during hospitalization  Description: INTERVENTIONS:  - Assess and monitor for signs and symptoms of infection  - Monitor lab/diagnostic results  - Monitor all insertion sites, i e  indwelling lines, tubes, and drains  - Monitor endotracheal if appropriate and nasal secretions for changes in amount and color  - [place] appropriate cooling/warming therapies per order  - Administer medications as ordered  - Instruct and encourage patient and family to use good hand hygiene technique  - Identify and instruct in appropriate isolation precautions for identified infection/condition  Outcome: Progressing  Goal: Absence of fever/infection during neutropenic period  Description: INTERVENTIONS:  - Monitor WBC    Outcome: Progressing     Problem: SAFETY ADULT  Goal: Patient will remain free of falls  Description: INTERVENTIONS:  - Assess patient frequently for physical needs  -  Identify cognitive and physical deficits and behaviors that affect risk of falls    -  [place] fall precautions as indicated by assessment   - Educate patient/family on patient safety including physical limitations  - Instruct patient to call for assistance with activity based on assessment  - Modify environment to reduce risk of injury  - Consider OT/PT consult to assist with strengthening/mobility  Outcome: Progressing  Goal: Maintain or return to baseline ADL function  Description: INTERVENTIONS:  -  Assess patient's ability to carry out ADLs; assess patient's baseline for ADL function and identify physical deficits which impact ability to perform ADLs (bathing, care of mouth/teeth, toileting, grooming, dressing, etc )  - Assess/evaluate cause of self-care deficits   - Assess range of motion  - Assess patient's mobility; develop plan if impaired  - Assess patient's need for assistive devices and provide as appropriate  - Encourage maximum independence but intervene and supervise when necessary  - Involve family in performance of ADLs  - Assess for home care needs following discharge   - Consider OT consult to assist with ADL evaluation and planning for discharge  - Provide patient education as appropriate  Outcome: Progressing  Goal: Maintain or return mobility status to optimal level  Description: INTERVENTIONS:  - Assess patient's baseline mobility status (ambulation, transfers, stairs, etc )    - Identify cognitive and physical deficits and behaviors that affect mobility  - Identify mobility aids required to assist with transfers and/or ambulation (gait belt, sit-to-stand, lift, walker, cane, etc )  - [place] fall precautions as indicated by assessment  - Record patient progress and toleration of activity level on Mobility SBAR; progress patient to next Phase/Stage  - Instruct patient to call for assistance with activity based on assessment  - Consider rehabilitation consult to assist with strengthening/weightbearing, etc   Outcome: Progressing     Problem: DISCHARGE PLANNING  Goal: Discharge to home or other facility with appropriate resources  Description: INTERVENTIONS:  - Identify barriers to discharge w/patient and caregiver  - Arrange for needed discharge resources and transportation as appropriate  - Identify discharge learning needs (meds, wound care, etc )  - Arrange for interpretive services to assist at discharge as needed  - Refer to Case Management Department for coordinating discharge planning if the patient needs post-hospital services based on physician/advanced practitioner order or complex needs related to functional status, cognitive ability, or social support system  Outcome: Progressing     Problem: Knowledge Deficit  Goal: Patient/family/caregiver demonstrates understanding of disease process, treatment plan, medications, and discharge instructions  Description: Complete learning assessment and assess knowledge base    Interventions:  - Provide teaching at level of understanding  - Provide teaching via preferred learning methods  Outcome: Progressing

## 2020-12-31 NOTE — PROGRESS NOTES
Progress Note - Infectious Disease   Ruiz Gardiner 66 y o  male MRN: [id number]  Unit/Bed#: E5 -01 Encounter: [id number]    Impression/Plan:  1  MSSA bacteremia  With only 1 of 2 sets positive   While this could represent a contaminant, the patient had the same organism isolated in the foot wound which had been worsening  Therefore I suspect this is more of a transient bacteremia   TTE was negative for valvular vegetation   Fortunately the patient is not systemically ill and has remained hemodynamically stable   His repeat blood cultures were negative after five days   Today he is afebrile   His WBC count has been normal   -antibiotic as below  -monitor CBC and BMP  -monitor vitals     2  Left foot osteomyelitis   Primarily involving the 5th metatarsal but also the phalanx   Infection appeared to be polymicrobial although I suspect that MSSA is the primary invasive organism  Patient is now status post 5th ray amputation   Unfortunately his bone margin remained positive for osteomyelitis and he will need to return to the OR this afternoon for additional resection  He is receiving IV cefepime which he is tolerating without difficulty  Anticipate transition to IV cefazolin in post op setting if surgical cure is achieved  -continue IV cefepime for now  -anticipate transition to IV cefazolin after surgery if surgical cure is achieved  -anticipate 14 days of IV antibiotic treatment through 1/4/2021  -monitor CBC and BMP  -monitor vitals  -serial left foot exams  -local wound care per Podiatry  -continue close follow-up with Podiatry     3   Chronic kidney disease-advanced   Renal function has been waxing and waning   Possibly a pre renal issue    -monitor creatinine  -dose adjust antibiotics for renal function as needed  -volume management per SLIM  -avoid nephrotoxins     4  Chronic dysuria   With positive urine culture for BCG as expected post treatment for bladder cancer   The patient is now status post biopsy with mild chronic inflammation but AFB smears negative for invasive disease   AFB Culture from the biopsies negative thus far at 4 week   A new repeat urine AFB culture is now pending   -follow-up AFB cultures  -ongoing follow-up with outpatient Urology and Infectious Disease after discharge    Above plan was discussed in detail with patient at the bedside  Antibiotics:  Cefepime 12  Antibiotics 14  Post op #10    Subjective:  Patient reports he is feeling well today and is ready for his next surgery  Still has some mild pain in his left foot but tells me it is tolerable  He has no fever, chills, sweats, shakes; no nausea, vomiting, abdominal pain, diarrhea, or dysuria; no cough, shortness of breath, or chest pain  No new symptoms  Objective:  Vitals:  Temp:  [97 1 °F (36 2 °C)-97 9 °F (36 6 °C)] 97 1 °F (36 2 °C)  HR:  [54-63] 63  Resp:  [18] 18  BP: (120-163)/(77-79) 163/77  SpO2:  [99 %-100 %] 99 %  Temp (24hrs), Av 5 °F (36 4 °C), Min:97 1 °F (36 2 °C), Max:97 9 °F (36 6 °C)  Current: Temperature: (!) 97 1 °F (36 2 °C)    Physical Exam:   General Appearance:  Alert, interactive, nontoxic, no acute distress  He appeared comfortable lying on his left side in bed  Throat: Oropharynx moist without lesions  Lungs:   Clear to auscultation bilaterally; no wheezes, rhonchi or rales; respirations unlabored; patient is on room air   Heart:  RRR; no murmur, rub or gallop   Abdomen:   Soft, non-tender, non-distended, positive bowel sounds  Extremities: No clubbing or cyanosis; left foot dressing with overlying Ace is intact, no breakthrough drainage   Skin: No new rashes noted on exposed skin       Labs, Imaging, & Other studies:   All pertinent labs and imaging studies were personally reviewed  Results from last 7 days   Lab Units 20  0503 20  0507 20  0538   WBC Thousand/uL 8 40 8 34 8 22   HEMOGLOBIN g/dL 9 7* 9 4* 9 2*   PLATELETS Thousands/uL 228 199 184     Results from last 7 days   Lab Units 12/30/20  0503 12/28/20  0507   POTASSIUM mmol/L 4 6 4 3   CHLORIDE mmol/L 103 104   CO2 mmol/L 29 26   BUN mg/dL 36* 28*   CREATININE mg/dL 2 28* 2 13*   EGFR ml/min/1 73sq m 31 33   CALCIUM mg/dL 8 5 8 6   AST U/L  --  25   ALT U/L  --  29   ALK PHOS U/L  --  116

## 2020-12-31 NOTE — PROGRESS NOTES
[address] [facility]iatry - Pre Operative Note  Patient: Alem Perez 66 y o  male   MRN: [id number]  PCP: Daina Marks MD  Unit/Bed#: E5 -32 Encounter: [id number]  Date Of Visit: 20      Assessment:    Podiatric Assessment:  1  Left foot ulcer with underlying Osteomyelitis of 5th metatarsal - Gonzalez 3  - s/p partial 5th ray resection (DOS 2020), with residual OM   2  Left foot cellulitis  3  MSSA bacteremia, POA  4  Insulin Dependent Diabetes mellitus  5  Coronary arterial disease  6  Peripheral arterial disease  7  Hypertension  8  Chronic Kidney Disease stage 4      PLAN:    · Patient to go to OR today, 20, for  left  Partial 5th ray resection  with Dr Sylvester Dorantes  · Consent placed in chart  To be signed with surgeon prior to procedure  · Confirmed NPO status  · H&P, vitals, and current labs reviewed  No acute changes noted  · Alternatives, risks, and complications discussed with patient  · All questions answered  No guarantees given to outcome of procedure  SUBJECTIVE:     Chief Complaint: No chief complaint on file  The patient was seen, evaluated, and assessed at bedside today  The patient was awake, alert, and in no acute distress  Patient confirmed NPO status  All questions and concerns regarding the surgical procedure addressed  Patient understands risks vs benefits of procedure and remains amenable with plan for surgery today  Patient denies N/V/F/chills/SOB/CP  OBJECTIVE:     Vitals:   /80 (BP Location: Left arm)   Pulse 66   Temp 97 8 °F (36 6 °C) (Temporal)   Resp 18   Wt 103 kg (227 lb 8 2 oz)   SpO2 98%   BMI 27 69 kg/m²     Temp (24hrs), Av 6 °F (36 4 °C), Min:97 1 °F (36 2 °C), Max:97 9 °F (36 6 °C)      PHYSICAL EXAM:     General: Alert, cooperative and no distress  Lungs: Non labored breathing  Abdomen: Soft, non-tender  Extremity:     NVS at baseline B/l  MSK function at baseline B/l  No calf tenderness noted B/l   Dressing is left intact to the OR  Additional Data:     Labs:    Results from last 7 days   Lab Units 12/30/20  0503  12/25/20  0550   WBC Thousand/uL 8 40   < > 8 10   HEMOGLOBIN g/dL 9 7*   < > 8 8*   HEMATOCRIT % 30 1*   < > 27 5*   PLATELETS Thousands/uL 228   < > 166   NEUTROS PCT %  --   --  66   LYMPHS PCT %  --   --  19   LYMPHO PCT % 23  --   --    MONOS PCT %  --   --  11   MONO PCT % 7  --   --    EOS PCT % 2  --  2    < > = values in this interval not displayed  Results from last 7 days   Lab Units 12/30/20  0503 12/28/20  0507   POTASSIUM mmol/L 4 6 4 3   CHLORIDE mmol/L 103 104   CO2 mmol/L 29 26   BUN mg/dL 36* 28*   CREATININE mg/dL 2 28* 2 13*   CALCIUM mg/dL 8 5 8 6   ALK PHOS U/L  --  116   ALT U/L  --  29   AST U/L  --  25           * I Have Reviewed All Lab Data Listed Above  Recent Cultures (last 7 days):           Imaging: I have personally reviewed pertinent films in PACS  EKG, Pathology, and Other Studies: I have personally reviewed pertinent reports  Today, Patient Was Seen By: Kate Ramos DPM    ** Please Note: Portions of the record may have been created with voice recognition software  Occasional wrong word or "sound a like" substitutions may have occurred due to the inherent limitations of voice recognition software  Read the chart carefully and recognize, using context, where substitutions have occurred   **

## 2020-12-31 NOTE — ASSESSMENT & PLAN NOTE
Stable  Okay to proceed with planned procedure today    Continue  aspirin 81 mg daily, metoprolol succinate 100 mg daily, and Imdur 90 mg daily

## 2020-12-31 NOTE — PLAN OF CARE
Problem: PHYSICAL THERAPY ADULT  Goal: Performs mobility at highest level of function for planned discharge setting  See evaluation for individualized goals  Description: Treatment/Interventions: Functional transfer training, LE strengthening/ROM, Elevations, Therapeutic exercise, Endurance training, Patient/family training, Equipment eval/education, Bed mobility, Gait training, Spoke to nursing, Spoke to case management, OT  Equipment Recommended: Garrett Campos       See flowsheet documentation for full assessment, interventions and recommendations  Outcome: Progressing  Note: Prognosis: Good  Problem List: Decreased strength, Decreased endurance, Impaired balance, Decreased mobility, Orthopedic restrictions, Pain  Assessment: Pt tolerated tx well today, He was able to progress his endurance with gait training to 20'x2 with min assist x 1 and was able to maintain NWB status on left LE  Pt able to perform all exercises as directed this tx session  Pt would benefit from rehab to maximize functional mobility  Continue with current POC working towards goals  Barriers to Discharge: Inaccessible home environment  Barriers to Discharge Comments: reports wife cannot assist much at home  PT Discharge Recommendation: Post-Acute Rehabilitation Services     PT - OK to Discharge: Yes(to rehab once medically cleared)    See flowsheet documentation for full assessment

## 2020-12-31 NOTE — PHYSICAL THERAPY NOTE
12/31/20 0833   PT Last Visit   PT Visit Date 12/31/20   Note Type   Note Type Treatment   Pain Assessment   Pain Assessment Tool 0-10   Pain Score 6   Pain Location/Orientation Orientation: Left; Location: Foot   Restrictions/Precautions   Weight Bearing Precautions Per Order Yes   LLE Weight Bearing Per Order NWB   Other Precautions WBS; Fall Risk;Pain   General   Chart Reviewed Yes   Family/Caregiver Present No   Cognition   Overall Cognitive Status WFL   Orientation Level Oriented X4   Subjective   Subjective pt agreeable to participate in PT tx   Bed Mobility   Supine to Sit 6  Modified independent   Additional items Assist x 1   Sit to Supine 6  Modified independent   Additional items Assist x 1;Verbal cues   Transfers   Sit to Stand 5  Supervision   Additional items Assist x 1   Stand to Sit 6  Modified independent   Additional items Assist x 1;Verbal cues   Ambulation/Elevation   Gait pattern   (hops on right foot)   Gait Assistance 4  Minimal assist   Additional items Assist x 1   Assistive Device Rolling walker   Distance 20'x2   Balance   Static Sitting Fair +   Dynamic Sitting Fair   Static Standing Fair -   Dynamic Standing Poor +   Ambulatory Poor +   Endurance Deficit   Endurance Deficit Yes   Endurance Deficit Description fatigue   Activity Tolerance   Activity Tolerance Patient limited by fatigue;Patient tolerated treatment well   Nurse Made Aware RN Rafia   Exercises   TKR Supine;Sitting;15 reps;AROM; Bilateral   Assessment   Prognosis Good   Problem List Decreased strength;Decreased endurance; Impaired balance;Decreased mobility;Orthopedic restrictions;Pain   Assessment Pt tolerated tx well today, He was able to progress his endurance with gait training to 20'x2 with min assist x 1 and was able to maintain NWB status on left LE  Pt able to perform all exercises as directed this tx session  Pt would benefit from rehab to maximize functional mobility  Continue with current POC working towards goals  Barriers to Discharge Inaccessible home environment   Barriers to Discharge Comments reports wife cannot assist much at home  Goals   Patient Goals to get better   STG Expiration Date 01/01/21   PT Treatment Day 4   Plan   Treatment/Interventions Functional transfer training;LE strengthening/ROM; Therapeutic exercise; Endurance training;Patient/family training;Equipment eval/education; Bed mobility;Gait training;Spoke to nursing   Progress Slow progress, medical status limitations   PT Frequency   (3-5x/week)   Recommendation   PT Discharge Recommendation Post-Acute Rehabilitation Services   Equipment Recommended   (RW)   PT - OK to Discharge Yes  (to rehab once medically cleared)   Roderick Sheehan, PTA

## 2021-01-01 LAB
GLUCOSE SERPL-MCNC: 150 MG/DL (ref 65–140)
GLUCOSE SERPL-MCNC: 155 MG/DL (ref 65–140)
GLUCOSE SERPL-MCNC: 177 MG/DL (ref 65–140)
GLUCOSE SERPL-MCNC: 99 MG/DL (ref 65–140)

## 2021-01-01 PROCEDURE — 99233 SBSQ HOSP IP/OBS HIGH 50: CPT | Performed by: INTERNAL MEDICINE

## 2021-01-01 PROCEDURE — 99232 SBSQ HOSP IP/OBS MODERATE 35: CPT | Performed by: INTERNAL MEDICINE

## 2021-01-01 PROCEDURE — 82948 REAGENT STRIP/BLOOD GLUCOSE: CPT

## 2021-01-01 RX ORDER — CEFAZOLIN SODIUM 1 G/50ML
1000 SOLUTION INTRAVENOUS EVERY 8 HOURS
Status: COMPLETED | OUTPATIENT
Start: 2021-01-01 | End: 2021-01-04

## 2021-01-01 RX ADMIN — TAMSULOSIN HYDROCHLORIDE 0.4 MG: 0.4 CAPSULE ORAL at 21:48

## 2021-01-01 RX ADMIN — HEPARIN SODIUM 5000 UNITS: 5000 INJECTION INTRAVENOUS; SUBCUTANEOUS at 09:00

## 2021-01-01 RX ADMIN — INSULIN LISPRO 1 UNITS: 100 INJECTION, SOLUTION INTRAVENOUS; SUBCUTANEOUS at 21:48

## 2021-01-01 RX ADMIN — LATANOPROST 1 DROP: 50 SOLUTION OPHTHALMIC at 21:48

## 2021-01-01 RX ADMIN — SERTRALINE HYDROCHLORIDE 50 MG: 50 TABLET ORAL at 09:00

## 2021-01-01 RX ADMIN — OXYCODONE HYDROCHLORIDE 5 MG: 5 TABLET ORAL at 12:13

## 2021-01-01 RX ADMIN — ACETAMINOPHEN 975 MG: 325 TABLET ORAL at 05:59

## 2021-01-01 RX ADMIN — PANTOPRAZOLE SODIUM 40 MG: 40 TABLET, DELAYED RELEASE ORAL at 06:02

## 2021-01-01 RX ADMIN — CEFAZOLIN SODIUM 1000 MG: 1 SOLUTION INTRAVENOUS at 21:48

## 2021-01-01 RX ADMIN — GABAPENTIN 600 MG: 300 CAPSULE ORAL at 09:00

## 2021-01-01 RX ADMIN — CEFAZOLIN SODIUM 1000 MG: 1 SOLUTION INTRAVENOUS at 14:01

## 2021-01-01 RX ADMIN — ISOSORBIDE MONONITRATE 90 MG: 30 TABLET, EXTENDED RELEASE ORAL at 09:00

## 2021-01-01 RX ADMIN — METOPROLOL SUCCINATE 100 MG: 50 TABLET, EXTENDED RELEASE ORAL at 09:00

## 2021-01-01 RX ADMIN — CEFEPIME HYDROCHLORIDE 2000 MG: 2 INJECTION, POWDER, FOR SOLUTION INTRAVENOUS at 12:13

## 2021-01-01 RX ADMIN — ASPIRIN 81 MG CHEWABLE TABLET 81 MG: 81 TABLET CHEWABLE at 09:00

## 2021-01-01 RX ADMIN — OXYCODONE HYDROCHLORIDE 5 MG: 5 TABLET ORAL at 21:48

## 2021-01-01 RX ADMIN — HEPARIN SODIUM 5000 UNITS: 5000 INJECTION INTRAVENOUS; SUBCUTANEOUS at 21:48

## 2021-01-01 RX ADMIN — INSULIN LISPRO 1 UNITS: 100 INJECTION, SOLUTION INTRAVENOUS; SUBCUTANEOUS at 17:36

## 2021-01-01 RX ADMIN — ACETAMINOPHEN 975 MG: 325 TABLET ORAL at 12:13

## 2021-01-01 RX ADMIN — OXYCODONE HYDROCHLORIDE 5 MG: 5 TABLET ORAL at 05:59

## 2021-01-01 RX ADMIN — ACETAMINOPHEN 975 MG: 325 TABLET ORAL at 17:22

## 2021-01-01 NOTE — ASSESSMENT & PLAN NOTE
Stable     Tolerated his procedure without any cardiac complication  Continue  aspirin 81 mg daily, metoprolol succinate 100 mg daily, and Imdur 90 mg daily

## 2021-01-01 NOTE — PROGRESS NOTES
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 - Gonzalez 3  - s/p partial 5th ray resection (DOS 12/21/2020), with residual OM   - s/p partial 5th ray resection (DOS 12/31/2020)  2  Left foot cellulitis  3  MSSA bacteremia, POA  4  Insulin Dependent Diabetes mellitus  5  Coronary arterial disease  6  Peripheral arterial disease  7  Hypertension  8  Chronic Kidney Disease stage 4      PLAN:    · Presumed surgical cure of OM  Patient is stable for discharge after appropriate placement facility is found from podiatric perspective  · Post-surgical dressing located on surgical site and affected extremity were changed today  Surgical site appears stable with skin edges well coapted and sutures remaining intact  · Vitals signs stable  Patient afebrile with no leukocytosis  No erythema, edema, or lymphangitis noted either proximal or distal to the dressings  · Pain is well controlled  Continue current pain management regimen  · Continue non-weight bearing to affected extremity with elevation while non-ambulatory  · Physical therapy recommending Post-Acute Rehabilitation placement  Awaiting OT evaluation  · CM consult placed for assistance with placement  SUBJECTIVE:     The patient was seen, evaluated, and assessed at bedside today  The patient was awake, alert, and in no acute distress  The patient reports mild pain at this time  Pain is well controlled with current pain management regimen  Patient reports normal appetite and using the restroom postoperatively  Patient denies N/V/F/chills/SOB/CP        OBJECTIVE:     Vitals:   /86 (BP Location: Right arm)   Pulse 60   Temp 98 °F (36 7 °C) (Temporal)   Resp 18   Wt 103 kg (226 lb 13 7 oz)   SpO2 98% BMI 27 61 kg/m²     Temp (24hrs), Av 4 °F (36 3 °C), Min:96 6 °F (35 9 °C), Max:98 °F (36 7 °C)      Physical Exam:     General:  Alert, cooperative, and in no distress  Lower extremity exam:  Cardiovascular status at baseline  Neurological status at baseline  Musculoskeletal status at baseline  No erythema, edema, or lymphangitis noted from dressing  Lower extremity temperature WNL  Stable surgical site noted of left foot with skin edges well coapted and sutures remaining intact  No clinical signs of acute infection  Clinical Images 21: Additional Data:     Labs:    Results from last 7 days   Lab Units 20  0503   WBC Thousand/uL 8 40   HEMOGLOBIN g/dL 9 7*   HEMATOCRIT % 30 1*   PLATELETS Thousands/uL 228   LYMPHO PCT % 23   MONO PCT % 7   EOS PCT % 2     Results from last 7 days   Lab Units 20  0503 20  0507   POTASSIUM mmol/L 4 6 4 3   CHLORIDE mmol/L 103 104   CO2 mmol/L 29 26   BUN mg/dL 36* 28*   CREATININE mg/dL 2 28* 2 13*   CALCIUM mg/dL 8 5 8 6   ALK PHOS U/L  --  116   ALT U/L  --  29   AST U/L  --  25           * I Have Reviewed All Lab Data Listed Above  Recent Cultures (last 7 days):               Imaging: I have personally reviewed pertinent films in PACS  EKG, Pathology, and Other Studies: I have personally reviewed pertinent reports  ** Please Note: Portions of the record may have been created with voice recognition software  Occasional wrong word or "sound a like" substitutions may have occurred due to the inherent limitations of voice recognition software  Read the chart carefully and recognize, using context, where substitutions have occurred   **

## 2021-01-01 NOTE — PROGRESS NOTES
Progress Note - Delvis Saxena 1945, 66 y o  male MRN: [id number]    Unit/Bed#: E5 -01 Encounter: [id number]    Primary Care Provider: Sachin Guerrero MD   Date and time admitted to hospital: 12/18/2020 11:52 AM        * [facility] )  Assessment & Plan  · Management per primary service    - s/p RAY RESECTION FOOT (Left) by Podiatry 12/21/2020  - status post partial left 5th metatarsal resection due to + margins 12/31/2020    MSSA bacteremia  Assessment & Plan  · Related to foot infection  · WIthout endocarditis with rapid clearance  · Management per ID    -  IV cefepime until 1/4/21 per ID (14 days total)    Type 2 diabetes mellitus, with long-term current use of insulin [facility])  Assessment & Plan  Lab Results   Component Value Date    HGBA1C 7 4 (H) 12/18/2020     Recent Labs     12/31/20  1405 12/31/20  1709 12/31/20  2056 01/01/21  0754   POCGLU 113 242* 124 99   A1c at goal  Blood sugar stable  Perioperative blood sugar goal less than 180  Currently off Lantus and just on sliding scale insulin    Acute kidney injury superimposed on chronic kidney disease ([facility] )  Assessment & Plan  GARY on CKD stage IV  GARY now resolved after IV fluid    Hypertension with renal disease  Assessment & Plan  Continue metoprolol succinate 100 mg daily and imdur 90 mg daily      CAD (coronary artery disease)  Assessment & Plan  Stable  Tolerated his procedure without any cardiac complication  Continue  aspirin 81 mg daily, metoprolol succinate 100 mg daily, and Imdur 90 mg daily      VTE Pharmacologic Prophylaxis:   Pharmacologic: Heparin  Mechanical VTE Prophylaxis in Place: No    Time Spent for Care: 20 minutes  More than 50% of total time spent on counseling and coordination of care as described above  Current Length of Stay: 14 day(s)    Current Patient Status: Inpatient     Discharge Plan:  Short-term rehab    Code Status: Level 1 - Full Code      Subjective:    Intermittent left foot pain  Insomnia  No fever or chills  He reports poor sleep in general   He reports melatonin and Xanax do not work well    Objective:     Vitals:   Temp (24hrs), Av 4 °F (36 3 °C), Min:96 6 °F (35 9 °C), Max:98 °F (36 7 °C)    Temp:  [96 6 °F (35 9 °C)-98 °F (36 7 °C)] 98 °F (36 7 °C)  HR:  [52-76] 60  Resp:  [12-18] 18  BP: (118-164)/(64-86) 143/86  SpO2:  [97 %-100 %] 98 %  Body mass index is 27 61 kg/m²  Input and Output Summary (last 24 hours): Intake/Output Summary (Last 24 hours) at 2021 1159  Last data filed at 2021 0601  Gross per 24 hour   Intake 2520 ml   Output 850 ml   Net 1670 ml       Physical Exam:     Physical Exam  Constitutional:       Appearance: He is not ill-appearing or diaphoretic  HENT:      Head: Normocephalic  Nose: No rhinorrhea  Eyes:      General: No scleral icterus  Neck:      Musculoskeletal: Neck supple  Cardiovascular:      Rate and Rhythm: Regular rhythm  Heart sounds: No murmur  Pulmonary:      Effort: Pulmonary effort is normal  No respiratory distress  Breath sounds: No wheezing or rales  Abdominal:      General: Abdomen is flat  Palpations: Abdomen is soft  Musculoskeletal:         General: Deformity present  Skin:     General: Skin is warm and dry  Neurological:      Mental Status: He is alert  Mental status is at baseline     Psychiatric:         Mood and Affect: Mood normal          Behavior: Behavior normal          Additional Data:     Labs:    Results from last 7 days   Lab Units 20  0503   WBC Thousand/uL 8 40   HEMOGLOBIN g/dL 9 7*   HEMATOCRIT % 30 1*   PLATELETS Thousands/uL 228   BANDS PCT % 2   LYMPHO PCT % 23   MONO PCT % 7   EOS PCT % 2     Results from last 7 days   Lab Units 20  0503 20  0507   SODIUM mmol/L 135* 137   POTASSIUM mmol/L 4 6 4 3   CHLORIDE mmol/L 103 104   CO2 mmol/L 29 26   BUN mg/dL 36* 28*   CREATININE mg/dL 2 28* 2 13*   ANION GAP mmol/L 3* 7   CALCIUM mg/dL 8 5 8 6 ALBUMIN g/dL  --  2 7*   TOTAL BILIRUBIN mg/dL  --  0 23   ALK PHOS U/L  --  116   ALT U/L  --  29   AST U/L  --  25   GLUCOSE RANDOM mg/dL 123 99         Results from last 7 days   Lab Units 01/01/21  0754 12/31/20  2056 12/31/20  1709 12/31/20  1405 12/31/20  1109 12/31/20  0703 12/30/20  2031 12/30/20  1457 12/30/20  1127 12/30/20  0703 12/29/20  2110 12/29/20  1547   POC GLUCOSE mg/dl 99 124 242* 113 118 100 169* 156* 116 98 142* 110                   * I Have Reviewed All Lab Data Listed Above  * Additional Pertinent Lab Tests Reviewed:  All Labs Within Last 24 Hours Reviewed    Imaging:    Imaging Reports Reviewed Today Include:  None    Recent Cultures (last 7 days):           Last 24 Hours Medication List:   Current Facility-Administered Medications   Medication Dose Route Frequency Provider Last Rate    acetaminophen  975 mg Oral Q6H [facility] Jeis Franklin DPM      ALPRAZolam  0 25 mg Oral HS PRN Jesi Franklin, DPM      aspirin  81 mg Oral Daily Jesi Franklin DPM      cefepime  2,000 mg Intravenous Q12H Jesi Franklin DPM 2,000 mg (12/31/20 5586)    fluticasone  1 spray Each Nare Daily Jesi Franklin DPFLORESITA      gabapentin  600 mg Oral Daily Jesi Franklin DPM      heparin (porcine)  5,000 Units Subcutaneous Q12H [facility] Jesi Franklin DPFLORESITA      insulin lispro  1-5 Units Subcutaneous 4x Daily (AC & HS) Jesi Franklin DPFLORESITA      isosorbide mononitrate  90 mg Oral Daily Jesi Franklin DPM      latanoprost  1 drop Both Eyes HS Jesi Franlkin DPM      loperamide  2 mg Oral TID PRN Jesi Franklin DPM      melatonin  3 mg Oral HS PRN Jesi Franklin DPM      metoprolol succinate  100 mg Oral Daily Jesi Franklin DPM      ondansetron  4 mg Intravenous Q6H PRN Jesi Franklin, DPM      oxyCODONE  5 mg Oral Q6H PRN Jesi Franklin, DPM      pantoprazole  40 mg Oral Early Morning Jesi Franklin, DPM      sertraline  50 mg Oral Daily Jesi Franklin DPM      tamsulosin  0 4 mg Oral HS Sharmin Ivan DPM      traMADol  50 mg Oral Q12H PRN Sharmin Ivan DPM          Today, Patient Was Seen By: Bernice Jones MD    ** Please Note: Dictation voice to text software may have been used in the creation of this document   **

## 2021-01-01 NOTE — PLAN OF CARE
Problem: Potential for Falls  Goal: Patient will remain free of falls  Description: INTERVENTIONS:  - Assess patient frequently for physical needs  -  Identify cognitive and physical deficits and behaviors that affect risk of falls    -  [place] fall precautions as indicated by assessment   - Educate patient/family on patient safety including physical limitations  - Instruct patient to call for assistance with activity based on assessment  - Modify environment to reduce risk of injury  - Consider OT/PT consult to assist with strengthening/mobility  Outcome: Progressing     Problem: MUSCULOSKELETAL - ADULT  Goal: Maintain or return mobility to safest level of function  Description: INTERVENTIONS:  - Assess patient's ability to carry out ADLs; assess patient's baseline for ADL function and identify physical deficits which impact ability to perform ADLs (bathing, care of mouth/teeth, toileting, grooming, dressing, etc )  - Assess/evaluate cause of self-care deficits   - Assess range of motion  - Assess patient's mobility  - Assess patient's need for assistive devices and provide as appropriate  - Encourage maximum independence but intervene and supervise when necessary  - Involve family in performance of ADLs  - Assess for home care needs following discharge   - Consider OT consult to assist with ADL evaluation and planning for discharge  - Provide patient education as appropriate  Outcome: Progressing  Goal: Maintain proper alignment of affected body part  Description: INTERVENTIONS:  - Support, maintain and protect limb and body alignment  - Provide patient/ family with appropriate education  Outcome: Progressing     Problem: PAIN - ADULT  Goal: Verbalizes/displays adequate comfort level or baseline comfort level  Description: Interventions:  - Encourage patient to monitor pain and request assistance  - Assess pain using appropriate pain scale  - Administer analgesics based on type and severity of pain and evaluate response  - Implement non-pharmacological measures as appropriate and evaluate response  - Consider cultural and social influences on pain and pain management  - Notify physician/advanced practitioner if interventions unsuccessful or patient reports new pain  Outcome: Progressing     Problem: INFECTION - ADULT  Goal: Absence or prevention of progression during hospitalization  Description: INTERVENTIONS:  - Assess and monitor for signs and symptoms of infection  - Monitor lab/diagnostic results  - Monitor all insertion sites, i e  indwelling lines, tubes, and drains  - Monitor endotracheal if appropriate and nasal secretions for changes in amount and color  - [place] appropriate cooling/warming therapies per order  - Administer medications as ordered  - Instruct and encourage patient and family to use good hand hygiene technique  - Identify and instruct in appropriate isolation precautions for identified infection/condition  Outcome: Progressing  Goal: Absence of fever/infection during neutropenic period  Description: INTERVENTIONS:  - Monitor WBC    Outcome: Progressing     Problem: SAFETY ADULT  Goal: Patient will remain free of falls  Description: INTERVENTIONS:  - Assess patient frequently for physical needs  -  Identify cognitive and physical deficits and behaviors that affect risk of falls    -  [place] fall precautions as indicated by assessment   - Educate patient/family on patient safety including physical limitations  - Instruct patient to call for assistance with activity based on assessment  - Modify environment to reduce risk of injury  - Consider OT/PT consult to assist with strengthening/mobility  Outcome: Progressing  Goal: Maintain or return to baseline ADL function  Description: INTERVENTIONS:  -  Assess patient's ability to carry out ADLs; assess patient's baseline for ADL function and identify physical deficits which impact ability to perform ADLs (bathing, care of mouth/teeth, toileting, grooming, dressing, etc )  - Assess/evaluate cause of self-care deficits   - Assess range of motion  - Assess patient's mobility; develop plan if impaired  - Assess patient's need for assistive devices and provide as appropriate  - Encourage maximum independence but intervene and supervise when necessary  - Involve family in performance of ADLs  - Assess for home care needs following discharge   - Consider OT consult to assist with ADL evaluation and planning for discharge  - Provide patient education as appropriate  Outcome: Progressing  Goal: Maintain or return mobility status to optimal level  Description: INTERVENTIONS:  - Assess patient's baseline mobility status (ambulation, transfers, stairs, etc )    - Identify cognitive and physical deficits and behaviors that affect mobility  - Identify mobility aids required to assist with transfers and/or ambulation (gait belt, sit-to-stand, lift, walker, cane, etc )  - [place] fall precautions as indicated by assessment  - Record patient progress and toleration of activity level on Mobility SBAR; progress patient to next Phase/Stage  - Instruct patient to call for assistance with activity based on assessment  - Consider rehabilitation consult to assist with strengthening/weightbearing, etc   Outcome: Progressing     Problem: DISCHARGE PLANNING  Goal: Discharge to home or other facility with appropriate resources  Description: INTERVENTIONS:  - Identify barriers to discharge w/patient and caregiver  - Arrange for needed discharge resources and transportation as appropriate  - Identify discharge learning needs (meds, wound care, etc )  - Arrange for interpretive services to assist at discharge as needed  - Refer to Case Management Department for coordinating discharge planning if the patient needs post-hospital services based on physician/advanced practitioner order or complex needs related to functional status, cognitive ability, or social support system  Outcome: Progressing     Problem: Knowledge Deficit  Goal: Patient/family/caregiver demonstrates understanding of disease process, treatment plan, medications, and discharge instructions  Description: Complete learning assessment and assess knowledge base    Interventions:  - Provide teaching at level of understanding  - Provide teaching via preferred learning methods  Outcome: Progressing     Problem: Prexisting or High Potential for Compromised Skin Integrity  Goal: Skin integrity is maintained or improved  Description: INTERVENTIONS:  - Identify patients at risk for skin breakdown  - Assess and monitor skin integrity  - Assess and monitor nutrition and hydration status  - Monitor labs   - Assess for incontinence   - Turn and reposition patient  - Assist with mobility/ambulation  - Relieve pressure over bony prominences  - Avoid friction and shearing  - Provide appropriate hygiene as needed including keeping skin clean and dry  - Evaluate need for skin moisturizer/barrier cream  - Collaborate with interdisciplinary team   - Patient/family teaching  - Consider wound care consult   Outcome: Progressing

## 2021-01-01 NOTE — PROGRESS NOTES
Progress Note - Infectious Disease   Nava Manuel 66 y o  male MRN: [id number]  Unit/Bed#: E5 -01 Encounter: [id number]    Impression/Plan:  1  MSSA bacteremia  With only 1 of 2 sets positive   While this could represent a contaminant, the patient had the same organism isolated in the foot wound which had been worsening  Therefore I suspect this is more of a transient bacteremia   TTE was negative for valvular vegetation   Fortunately the patient is not systemically ill and has remained hemodynamically stable   His repeat blood cultures were negative after five days   Today he is afebrile   His WBC count has been normal   -antibiotic as below  -monitor CBC and BMP  -monitor vitals     2  Left foot osteomyelitis   Primarily involving the 5th metatarsal but also the phalanx   Infection appeared to be polymicrobial although I suspect that MSSA is the primary invasive organism  Patient is now status post 5th ray amputation   Unfortunately his bone margin remained positive for osteomyelitis  He returned to the OR yesterday for additional resection  New margin pathology is pending  No Garcia is receiving IV cefepime which he is tolerating without difficulty  -continue IV cefepime for now  -anticipate transition to IV cefazolin after surgery if surgical cure is achieved  -anticipate 14 days of IV antibiotic treatment through 1/4/2021  -monitor CBC and BMP  -monitor vitals  -serial left foot exams  -local wound care per Podiatry  -continue close follow-up with Podiatry     3   Chronic kidney disease-advanced   Renal function has been waxing and waning   Possibly a pre renal issue    -monitor creatinine  -dose adjust antibiotics for renal function as needed  -volume management per SLIM  -avoid nephrotoxins     4  Chronic dysuria   With positive urine culture for BCG as expected post treatment for bladder cancer   The patient is now status post biopsy with mild chronic inflammation but AFB smears negative for invasive disease   AFB Culture from the biopsies negative thus far at 4 week   A new repeat urine AFB culture was collected on 2020 and is preliminarily negative   -follow-up AFB cultures  -ongoing follow-up with outpatient Urology and Infectious Disease after discharge    Above plan was discussed in detail with patient at the bedside  Above plan was discussed in detail with podiatry resident, Dr Sofie Schwarz  Antibiotics:  Cefepime 13  Antibiotic 15  Postop #11/#1    Subjective:  Patient reports he is doing well today  He does notice increased pain in his L foot since his surgery  Reports he hasn't spoken to podiatry yet to hear how the procedure went  He has no fever, chills, sweats, shakes; no nausea, vomiting, abdominal pain, diarrhea, or dysuria; no cough, shortness of breath, or chest pain  No new symptoms  Objective:  Vitals:  Temp:  [96 6 °F (35 9 °C)-98 °F (36 7 °C)] 98 °F (36 7 °C)  HR:  [52-76] 60  Resp:  [12-18] 18  BP: (118-164)/(64-86) 143/86  SpO2:  [97 %-100 %] 98 %  Temp (24hrs), Av 4 °F (36 3 °C), Min:96 6 °F (35 9 °C), Max:98 °F (36 7 °C)  Current: Temperature: 98 °F (36 7 °C)    Physical Exam:   General Appearance:  Alert, interactive, nontoxic, no acute distress  He appears comfortable sitting up in bed having his breakfast    Throat: Oropharynx moist without lesions  Lungs:   Clear to auscultation bilaterally; no wheezes, rhonchi or rales; respirations unlabored; patient is on room air   Heart:  RRR; no murmur, rub or gallop   Abdomen:   Soft, non-tender, non-distended, positive bowel sounds  Extremities: No clubbing or cyanosis; L foot surgical dressing with overlying Ace intact, no breakthrough drainage   Skin: No new rashes or lesions noted on exposed skin  Patient is overall warm and dry       Labs, Imaging, & Other studies:   All pertinent labs and imaging studies were personally reviewed  Results from last 7 days   Lab Units 20  0503 20  0507 20  0538   WBC Thousand/uL 8 40 8 34 8 22   HEMOGLOBIN g/dL 9 7* 9 4* 9 2*   PLATELETS Thousands/uL 228 199 184     Results from last 7 days   Lab Units 12/30/20  0503 12/28/20  0507   POTASSIUM mmol/L 4 6 4 3   CHLORIDE mmol/L 103 104   CO2 mmol/L 29 26   BUN mg/dL 36* 28*   CREATININE mg/dL 2 28* 2 13*   EGFR ml/min/1 73sq m 31 33   CALCIUM mg/dL 8 5 8 6   AST U/L  --  25   ALT U/L  --  29   ALK PHOS U/L  --  116

## 2021-01-01 NOTE — ASSESSMENT & PLAN NOTE
Lab Results   Component Value Date    HGBA1C 7 4 (H) 12/18/2020     Recent Labs     12/31/20  1405 12/31/20  1709 12/31/20 2056 01/01/21  0754   POCGLU 113 242* 124 99   A1c at goal  Blood sugar stable  Perioperative blood sugar goal less than 180  Currently off Lantus and just on sliding scale insulin

## 2021-01-01 NOTE — ASSESSMENT & PLAN NOTE
· Management per primary service    - s/p RAY RESECTION FOOT (Left) by Podiatry 12/21/2020  - status post partial left 5th metatarsal resection due to + margins 12/31/2020

## 2021-01-02 LAB
ANION GAP SERPL CALCULATED.3IONS-SCNC: 9 MMOL/L (ref 4–13)
BASOPHILS # BLD AUTO: 0.06 THOUSANDS/ΜL (ref 0–0.1)
BASOPHILS NFR BLD AUTO: 1 % (ref 0–1)
BUN SERPL-MCNC: 40 MG/DL (ref 5–25)
CALCIUM SERPL-MCNC: 9.1 MG/DL (ref 8.3–10.1)
CHLORIDE SERPL-SCNC: 104 MMOL/L (ref 100–108)
CO2 SERPL-SCNC: 25 MMOL/L (ref 21–32)
CREAT SERPL-MCNC: 2.28 MG/DL (ref 0.6–1.3)
EOSINOPHIL # BLD AUTO: 0.18 THOUSAND/ΜL (ref 0–0.61)
EOSINOPHIL NFR BLD AUTO: 2 % (ref 0–6)
ERYTHROCYTE [DISTWIDTH] IN BLOOD BY AUTOMATED COUNT: 13.6 % (ref 11.6–15.1)
GFR SERPL CREATININE-BSD FRML MDRD: 31 ML/MIN/1.73SQ M
GLUCOSE SERPL-MCNC: 138 MG/DL (ref 65–140)
GLUCOSE SERPL-MCNC: 153 MG/DL (ref 65–140)
GLUCOSE SERPL-MCNC: 81 MG/DL (ref 65–140)
GLUCOSE SERPL-MCNC: 94 MG/DL (ref 65–140)
GLUCOSE SERPL-MCNC: 96 MG/DL (ref 65–140)
HCT VFR BLD AUTO: 29.3 % (ref 36.5–49.3)
HGB BLD-MCNC: 9.5 G/DL (ref 12–17)
IMM GRANULOCYTES # BLD AUTO: 0.06 THOUSAND/UL (ref 0–0.2)
IMM GRANULOCYTES NFR BLD AUTO: 1 % (ref 0–2)
LYMPHOCYTES # BLD AUTO: 1.76 THOUSANDS/ΜL (ref 0.6–4.47)
LYMPHOCYTES NFR BLD AUTO: 21 % (ref 14–44)
MCH RBC QN AUTO: 30.6 PG (ref 26.8–34.3)
MCHC RBC AUTO-ENTMCNC: 32.4 G/DL (ref 31.4–37.4)
MCV RBC AUTO: 95 FL (ref 82–98)
MONOCYTES # BLD AUTO: 0.6 THOUSAND/ΜL (ref 0.17–1.22)
MONOCYTES NFR BLD AUTO: 7 % (ref 4–12)
NEUTROPHILS # BLD AUTO: 5.94 THOUSANDS/ΜL (ref 1.85–7.62)
NEUTS SEG NFR BLD AUTO: 68 % (ref 43–75)
NRBC BLD AUTO-RTO: 0 /100 WBCS
PLATELET # BLD AUTO: 223 THOUSANDS/UL (ref 149–390)
PMV BLD AUTO: 10.3 FL (ref 8.9–12.7)
POTASSIUM SERPL-SCNC: 4.5 MMOL/L (ref 3.5–5.3)
RBC # BLD AUTO: 3.1 MILLION/UL (ref 3.88–5.62)
SODIUM SERPL-SCNC: 138 MMOL/L (ref 136–145)
WBC # BLD AUTO: 8.6 THOUSAND/UL (ref 4.31–10.16)

## 2021-01-02 PROCEDURE — 99232 SBSQ HOSP IP/OBS MODERATE 35: CPT | Performed by: INTERNAL MEDICINE

## 2021-01-02 PROCEDURE — 85025 COMPLETE CBC W/AUTO DIFF WBC: CPT | Performed by: PODIATRIST

## 2021-01-02 PROCEDURE — 80048 BASIC METABOLIC PNL TOTAL CA: CPT | Performed by: PODIATRIST

## 2021-01-02 PROCEDURE — 82948 REAGENT STRIP/BLOOD GLUCOSE: CPT

## 2021-01-02 RX ADMIN — TAMSULOSIN HYDROCHLORIDE 0.4 MG: 0.4 CAPSULE ORAL at 21:55

## 2021-01-02 RX ADMIN — SERTRALINE HYDROCHLORIDE 50 MG: 50 TABLET ORAL at 08:29

## 2021-01-02 RX ADMIN — ISOSORBIDE MONONITRATE 90 MG: 30 TABLET, EXTENDED RELEASE ORAL at 08:29

## 2021-01-02 RX ADMIN — ASPIRIN 81 MG CHEWABLE TABLET 81 MG: 81 TABLET CHEWABLE at 08:29

## 2021-01-02 RX ADMIN — LATANOPROST 1 DROP: 50 SOLUTION OPHTHALMIC at 21:55

## 2021-01-02 RX ADMIN — CEFAZOLIN SODIUM 1000 MG: 1 SOLUTION INTRAVENOUS at 21:54

## 2021-01-02 RX ADMIN — PANTOPRAZOLE SODIUM 40 MG: 40 TABLET, DELAYED RELEASE ORAL at 05:51

## 2021-01-02 RX ADMIN — CEFAZOLIN SODIUM 1000 MG: 1 SOLUTION INTRAVENOUS at 12:50

## 2021-01-02 RX ADMIN — OXYCODONE HYDROCHLORIDE 5 MG: 5 TABLET ORAL at 05:51

## 2021-01-02 RX ADMIN — HEPARIN SODIUM 5000 UNITS: 5000 INJECTION INTRAVENOUS; SUBCUTANEOUS at 21:55

## 2021-01-02 RX ADMIN — INSULIN LISPRO 1 UNITS: 100 INJECTION, SOLUTION INTRAVENOUS; SUBCUTANEOUS at 11:19

## 2021-01-02 RX ADMIN — ACETAMINOPHEN 975 MG: 325 TABLET ORAL at 05:52

## 2021-01-02 RX ADMIN — OXYCODONE HYDROCHLORIDE 5 MG: 5 TABLET ORAL at 20:09

## 2021-01-02 RX ADMIN — ACETAMINOPHEN 975 MG: 325 TABLET ORAL at 11:19

## 2021-01-02 RX ADMIN — METOPROLOL SUCCINATE 100 MG: 50 TABLET, EXTENDED RELEASE ORAL at 08:29

## 2021-01-02 RX ADMIN — HEPARIN SODIUM 5000 UNITS: 5000 INJECTION INTRAVENOUS; SUBCUTANEOUS at 08:27

## 2021-01-02 RX ADMIN — ACETAMINOPHEN 975 MG: 325 TABLET ORAL at 17:11

## 2021-01-02 RX ADMIN — CEFAZOLIN SODIUM 1000 MG: 1 SOLUTION INTRAVENOUS at 05:52

## 2021-01-02 RX ADMIN — GABAPENTIN 600 MG: 300 CAPSULE ORAL at 08:29

## 2021-01-02 NOTE — PLAN OF CARE
Problem: Potential for Falls  Goal: Patient will remain free of falls  Description: INTERVENTIONS:  - Assess patient frequently for physical needs  -  Identify cognitive and physical deficits and behaviors that affect risk of falls    -  [place] fall precautions as indicated by assessment   - Educate patient/family on patient safety including physical limitations  - Instruct patient to call for assistance with activity based on assessment  - Modify environment to reduce risk of injury  - Consider OT/PT consult to assist with strengthening/mobility  Outcome: Progressing     Problem: MUSCULOSKELETAL - ADULT  Goal: Maintain or return mobility to safest level of function  Description: INTERVENTIONS:  - Assess patient's ability to carry out ADLs; assess patient's baseline for ADL function and identify physical deficits which impact ability to perform ADLs (bathing, care of mouth/teeth, toileting, grooming, dressing, etc )  - Assess/evaluate cause of self-care deficits   - Assess range of motion  - Assess patient's mobility  - Assess patient's need for assistive devices and provide as appropriate  - Encourage maximum independence but intervene and supervise when necessary  - Involve family in performance of ADLs  - Assess for home care needs following discharge   - Consider OT consult to assist with ADL evaluation and planning for discharge  - Provide patient education as appropriate  Outcome: Progressing  Goal: Maintain proper alignment of affected body part  Description: INTERVENTIONS:  - Support, maintain and protect limb and body alignment  - Provide patient/ family with appropriate education  Outcome: Progressing     Problem: PAIN - ADULT  Goal: Verbalizes/displays adequate comfort level or baseline comfort level  Description: Interventions:  - Encourage patient to monitor pain and request assistance  - Assess pain using appropriate pain scale  - Administer analgesics based on type and severity of pain and evaluate response  - Implement non-pharmacological measures as appropriate and evaluate response  - Consider cultural and social influences on pain and pain management  - Notify physician/advanced practitioner if interventions unsuccessful or patient reports new pain  Outcome: Progressing     Problem: INFECTION - ADULT  Goal: Absence or prevention of progression during hospitalization  Description: INTERVENTIONS:  - Assess and monitor for signs and symptoms of infection  - Monitor lab/diagnostic results  - Monitor all insertion sites, i e  indwelling lines, tubes, and drains  - Monitor endotracheal if appropriate and nasal secretions for changes in amount and color  - [place] appropriate cooling/warming therapies per order  - Administer medications as ordered  - Instruct and encourage patient and family to use good hand hygiene technique  - Identify and instruct in appropriate isolation precautions for identified infection/condition  Outcome: Progressing  Goal: Absence of fever/infection during neutropenic period  Description: INTERVENTIONS:  - Monitor WBC    Outcome: Progressing     Problem: SAFETY ADULT  Goal: Patient will remain free of falls  Description: INTERVENTIONS:  - Assess patient frequently for physical needs  -  Identify cognitive and physical deficits and behaviors that affect risk of falls    -  [place] fall precautions as indicated by assessment   - Educate patient/family on patient safety including physical limitations  - Instruct patient to call for assistance with activity based on assessment  - Modify environment to reduce risk of injury  - Consider OT/PT consult to assist with strengthening/mobility  Outcome: Progressing  Goal: Maintain or return to baseline ADL function  Description: INTERVENTIONS:  -  Assess patient's ability to carry out ADLs; assess patient's baseline for ADL function and identify physical deficits which impact ability to perform ADLs (bathing, care of mouth/teeth, toileting, grooming, dressing, etc )  - Assess/evaluate cause of self-care deficits   - Assess range of motion  - Assess patient's mobility; develop plan if impaired  - Assess patient's need for assistive devices and provide as appropriate  - Encourage maximum independence but intervene and supervise when necessary  - Involve family in performance of ADLs  - Assess for home care needs following discharge   - Consider OT consult to assist with ADL evaluation and planning for discharge  - Provide patient education as appropriate  Outcome: Progressing  Goal: Maintain or return mobility status to optimal level  Description: INTERVENTIONS:  - Assess patient's baseline mobility status (ambulation, transfers, stairs, etc )    - Identify cognitive and physical deficits and behaviors that affect mobility  - Identify mobility aids required to assist with transfers and/or ambulation (gait belt, sit-to-stand, lift, walker, cane, etc )  - [place] fall precautions as indicated by assessment  - Record patient progress and toleration of activity level on Mobility SBAR; progress patient to next Phase/Stage  - Instruct patient to call for assistance with activity based on assessment  - Consider rehabilitation consult to assist with strengthening/weightbearing, etc   Outcome: Progressing     Problem: DISCHARGE PLANNING  Goal: Discharge to home or other facility with appropriate resources  Description: INTERVENTIONS:  - Identify barriers to discharge w/patient and caregiver  - Arrange for needed discharge resources and transportation as appropriate  - Identify discharge learning needs (meds, wound care, etc )  - Arrange for interpretive services to assist at discharge as needed  - Refer to Case Management Department for coordinating discharge planning if the patient needs post-hospital services based on physician/advanced practitioner order or complex needs related to functional status, cognitive ability, or social support system  Outcome: Progressing     Problem: Knowledge Deficit  Goal: Patient/family/caregiver demonstrates understanding of disease process, treatment plan, medications, and discharge instructions  Description: Complete learning assessment and assess knowledge base    Interventions:  - Provide teaching at level of understanding  - Provide teaching via preferred learning methods  Outcome: Progressing     Problem: Prexisting or High Potential for Compromised Skin Integrity  Goal: Skin integrity is maintained or improved  Description: INTERVENTIONS:  - Identify patients at risk for skin breakdown  - Assess and monitor skin integrity  - Assess and monitor nutrition and hydration status  - Monitor labs   - Assess for incontinence   - Turn and reposition patient  - Assist with mobility/ambulation  - Relieve pressure over bony prominences  - Avoid friction and shearing  - Provide appropriate hygiene as needed including keeping skin clean and dry  - Evaluate need for skin moisturizer/barrier cream  - Collaborate with interdisciplinary team   - Patient/family teaching  - Consider wound care consult   Outcome: Progressing

## 2021-01-02 NOTE — PROGRESS NOTES
Progress Note - Gerald Scherer 1945, 66 y o  male MRN: [id number]    Unit/Bed#: E5 -01 Encounter: [id number]    Primary Care Provider: Dorene West MD   Date and time admitted to hospital: 12/18/2020 11:52 AM        * Subacute osteomyelitis of left foot ([facility] )  Assessment & Plan  · Management per primary service    - s/p RAY RESECTION FOOT (Left) by Podiatry 12/21/2020  - status post partial left 5th metatarsal resection due to + margins 12/31/2020    MSSA bacteremia  Assessment & Plan  · Related to foot infection  · WIthout endocarditis with rapid clearance  · Management per ID    -  IV cefepime until 1/4/21 per ID (14 days total)    Type 2 diabetes mellitus, with long-term current use of insulin [facility])  Assessment & Plan  Lab Results   Component Value Date    HGBA1C 7 4 (H) 12/18/2020     Recent Labs     01/01/21  2104 01/02/21  0733 01/02/21  1107 01/02/21  1639   POCGLU 177* 81 153* 96   A1c at goal  Blood sugar stable  Perioperative blood sugar goal less than 180  Currently off Lantus and just on sliding scale insulin    Acute kidney injury superimposed on chronic kidney disease ([facility] )  Assessment & Plan  GARY on CKD stage IV  GARY has resolved  Creatinine is stable and at baseline  Clinically no sign of pedal edema  He uses lasix prn and this is not needed at this time    Hypertension with renal disease  Assessment & Plan  Continue metoprolol succinate 100 mg daily and imdur 90 mg daily      CAD (coronary artery disease)  Assessment & Plan  Stable  Tolerated his procedure without any cardiac complication  Continue  aspirin 81 mg daily, metoprolol succinate 100 mg daily, and Imdur 90 mg daily      VTE Pharmacologic Prophylaxis:   Pharmacologic: Heparin  Mechanical VTE Prophylaxis in Place: No    Time Spent for Care:  20 minutes  More than 50% of total time spent on counseling and coordination of care as described above      Current Length of Stay: 15 day(s)    Current Patient Status: Inpatient Code Status: Level 1 - Full Code      Subjective:   Improved pain  Ambulating with a walker and nonweightbearing on the left foot  No fever  Awaiting rehab    Objective:     Vitals:   Temp (24hrs), Av 6 °F (36 4 °C), Min:97 2 °F (36 2 °C), Max:98 °F (36 7 °C)    Temp:  [97 2 °F (36 2 °C)-98 °F (36 7 °C)] 98 °F (36 7 °C)  HR:  [58-68] 58  Resp:  [18] 18  BP: (117-154)/(69-93) 117/69  SpO2:  [97 %-100 %] 100 %  Body mass index is 27 1 kg/m²  Input and Output Summary (last 24 hours): Intake/Output Summary (Last 24 hours) at 2021 1732  Last data filed at 2021 1101  Gross per 24 hour   Intake --   Output 1850 ml   Net -1850 ml       Physical Exam:     Physical Exam  Constitutional:       Appearance: He is not ill-appearing or diaphoretic  HENT:      Head: Normocephalic and atraumatic  Nose: No rhinorrhea  Eyes:      General: No scleral icterus  Neck:      Musculoskeletal: Neck supple  Cardiovascular:      Rate and Rhythm: Regular rhythm  Heart sounds: No murmur  No gallop  Pulmonary:      Effort: Pulmonary effort is normal       Breath sounds: Normal breath sounds  Abdominal:      General: Abdomen is flat  Bowel sounds are normal       Palpations: Abdomen is soft  Musculoskeletal:         General: Deformity present  Right lower leg: No edema  Left lower leg: No edema  Skin:     General: Skin is warm and dry  Neurological:      Mental Status: He is alert  Mental status is at baseline     Psychiatric:         Mood and Affect: Mood normal          Behavior: Behavior normal          Additional Data:     Labs:    Results from last 7 days   Lab Units 21  0602 20  0503   WBC Thousand/uL 8 60 8 40   HEMOGLOBIN g/dL 9 5* 9 7*   HEMATOCRIT % 29 3* 30 1*   PLATELETS Thousands/uL 223 228   BANDS PCT %  --  2   NEUTROS PCT % 68  --    LYMPHS PCT % 21  --    LYMPHO PCT %  --  23   MONOS PCT % 7  --    MONO PCT %  --  7   EOS PCT % 2 2     Results from last 7 days   Lab Units 01/02/21  0602  12/28/20  0507   SODIUM mmol/L 138   < > 137   POTASSIUM mmol/L 4 5   < > 4 3   CHLORIDE mmol/L 104   < > 104   CO2 mmol/L 25   < > 26   BUN mg/dL 40*   < > 28*   CREATININE mg/dL 2 28*   < > 2 13*   ANION GAP mmol/L 9   < > 7   CALCIUM mg/dL 9 1   < > 8 6   ALBUMIN g/dL  --   --  2 7*   TOTAL BILIRUBIN mg/dL  --   --  0 23   ALK PHOS U/L  --   --  116   ALT U/L  --   --  29   AST U/L  --   --  25   GLUCOSE RANDOM mg/dL 94   < > 99    < > = values in this interval not displayed  Results from last 7 days   Lab Units 01/02/21  1639 01/02/21  1107 01/02/21  0733 01/01/21  2104 01/01/21  1533 01/01/21  1159 01/01/21  0754 12/31/20  2056 12/31/20  1709 12/31/20  1405 12/31/20  1109 12/31/20  0703   POC GLUCOSE mg/dl 96 153* 81 177* 155* 150* 99 124 242* 113 118 100                   * I Have Reviewed All Lab Data Listed Above  * Additional Pertinent Lab Tests Reviewed:  All Labs Within Last 24 Hours Reviewed    Imaging:    Imaging Reports Reviewed Today Include:  None      Recent Cultures (last 7 days):           Last 24 Hours Medication List:   Current Facility-Administered Medications   Medication Dose Route Frequency Provider Last Rate    acetaminophen  975 mg Oral Q6H [address] Jocy Ponce DPM      ALPRAZolam  0 25 mg Oral HS PRN Jocy Ponce DPM      aspirin  81 mg Oral Daily Jocy Ponce DPM      cefazolin  1,000 mg Intravenous Q8H Medardo Mireles MD 1,000 mg (01/02/21 1250)    fluticasone  1 spray Each Nare Daily Jocy Ponce DPM      gabapentin  600 mg Oral Daily Jocy Ponce DPM      heparin (porcine)  5,000 Units Subcutaneous Q12H [address] Jocy Ponce DPM      insulin lispro  1-5 Units Subcutaneous 4x Daily (AC & HS) Jocy Ponce DPM      isosorbide mononitrate  90 mg Oral Daily Jocy Ponce DPM      latanoprost  1 drop Both Eyes HS Jocy Ponce DPM      loperamide  2 mg Oral TID PRN Jocy Ponce DPM      melatonin  3 mg Oral HS PRN Pegge Wilfrid, DPM      metoprolol succinate  100 mg Oral Daily Pegge Wilfrid, DPM      ondansetron  4 mg Intravenous Q6H PRN Pegge Wilfrid, DPM      oxyCODONE  5 mg Oral Q6H PRN Pegge Wilfrid, DPM      pantoprazole  40 mg Oral Early Morning Pegge Wilfrid, DPM      sertraline  50 mg Oral Daily Pegge Wilfrid, DPM      tamsulosin  0 4 mg Oral HS Pegge Wilfrid, DPM      traMADol  50 mg Oral Q12H PRN Pegge Wilfrid, DPM          Today, Patient Was Seen By: Luba Driscoll MD    ** Please Note: Dictation voice to text software may have been used in the creation of this document   **

## 2021-01-02 NOTE — PROGRESS NOTES
Podiatry - Progress Note  Patient: Alicia Lowry 66 y o  male   MRN: [id number]  PCP: Suman Serrano MD  Unit/Bed#: E5 -96 Encounter: [id number]  Date Of Visit: 21    ASSESSMENT:    Alicia Lowry is a 66 y o  male with:    1  Left foot ulcer with underlying Osteomyelitis of 5th metatarsal - [place] 3  - s/p partial 5th ray resection (DOS 2020), with residual OM   - s/p partial 5th ray resection (DOS 2020)  2  Left foot cellulitis  3  MSSA bacteremia, POA  4  Insulin Dependent Diabetes mellitus  5  Coronary arterial disease  6  Peripheral arterial disease  7  Hypertension  8  Chronic Kidney Disease stage 4    PLAN:    · Dressings to surgical site remain clean, dry, and intact today with no strikethrough noted  Plan for dressing change tomorrow  · Patient remains stable for discharge pending OT evaluation and placement  · VSS, no leukocytosis  · Pain well controlled, continue current pain regimen  · CM on board for placement  · Elevation on green foam wedges or pillows when non-ambulatory     Weightbearing status: NWB to LLE    SUBJECTIVE:     The patient was seen, evaluated, and assessed at bedside today  The patient was awake, alert, and in no acute distress  No acute events overnight  The patient reports no pain to his LLE  Patient denies N/V/F/chills/SOB/CP  OBJECTIVE:     Vitals:   /93 (BP Location: Right arm)   Pulse 68   Temp (!) 97 2 °F (36 2 °C) (Temporal)   Resp 18   Wt 101 kg (222 lb 10 6 oz)   SpO2 97%   BMI 27 10 kg/m²     Temp (24hrs), Av 5 °F (36 4 °C), Min:97 2 °F (36 2 °C), Max:97 8 °F (36 6 °C)      Physical Exam:     General: Alert, cooperative and no distress  Lungs: Non labored breathing  Abdomen: Soft, non-tender  Lower extremity exam:  Cardiovascular status at baseline  Neurological status at baseline  Musculoskeletal status at baseline  No calf tenderness noted  Dressings to LLE clean, dry, and intact today with no strikethrough noted  Additional Data:     Labs:    Results from last 7 days   Lab Units 01/02/21  0602   WBC Thousand/uL 8 60   HEMOGLOBIN g/dL 9 5*   HEMATOCRIT % 29 3*   PLATELETS Thousands/uL 223   NEUTROS PCT % 68   LYMPHS PCT % 21   MONOS PCT % 7   EOS PCT % 2     Results from last 7 days   Lab Units 01/02/21  0602  12/28/20  0507   POTASSIUM mmol/L 4 5   < > 4 3   CHLORIDE mmol/L 104   < > 104   CO2 mmol/L 25   < > 26   BUN mg/dL 40*   < > 28*   CREATININE mg/dL 2 28*   < > 2 13*   CALCIUM mg/dL 9 1   < > 8 6   ALK PHOS U/L  --   --  116   ALT U/L  --   --  29   AST U/L  --   --  25    < > = values in this interval not displayed  * I Have Reviewed All Lab Data Listed Above  Recent Cultures (last 7 days):               Imaging: I have personally reviewed pertinent films in PACS  EKG, Pathology, and Other Studies: I have personally reviewed pertinent reports  ** Please Note: Portions of the record may have been created with voice recognition software  Occasional wrong word or "sound a like" substitutions may have occurred due to the inherent limitations of voice recognition software  Read the chart carefully and recognize, using context, where substitutions have occurred   **

## 2021-01-02 NOTE — PROGRESS NOTES
Progress Note - Infectious Disease   Yoseph Kline 66 y o  male MRN: [id number]  Unit/Bed#: E5 -01 Encounter: [id number]      Impression/Recommendations:  1  MSSA bacteremia  There was growth in only 1 out of 2 admission blood cultures  Given active soft tissue infection, will treat patient as probable true bacteremia  Source is most likely left foot infection  Patient is clinically improved  Bacteremia cleared  2D echo without vegetation     -continue high-dose IV cefazolin  -treat x 14 days post initial I&D, through 1/4      2  Left foot osteomyelitis   Primarily involving the 5th metatarsal but also the phalanx   Infection appeared to be polymicrobial although I suspect that MSSA is the primary invasive organism  Patient is now status post 5th ray amputation   Unfortunately his bone margin remained positive for osteomyelitis  He returned to the OR for additional resection, this time for surgical cure  -no further antibiotic needed for this  -local wound care per Podiatry     3   Chronic kidney disease-advanced   Renal function has been waxing and waning   Possibly a pre renal issue   Creatinine is stable  -monitor creatinine  -cefazolin dose adjusted  -volume management per SLIM  -avoid nephrotoxins     4  Chronic dysuria  With positive urine culture for BCG as expected post treatment for bladder cancer   The patient is now status post biopsy with mild chronic inflammation but AFB smears negative for invasive disease   AFB Culture from the biopsies negative thus far at 4 week   A new repeat urine AFB culture was collected on 12/30/2020 and is preliminarily negative   -follow-up AFB cultures  -ongoing follow-up with outpatient Urology and Infectious Disease after discharge     Discussed with patient in detail regarding the above plan      Antibiotics:  Cefazolin  Antibiotic 16  Postop #12/#2     Subjective:  Patient  is comfortable  Pain in foot minimal   Temperature stays down  No chills    He is tolerating antibiotic well  No nausea, vomiting or diarrhea  Objective:  Vitals:  Temp:  [97 2 °F (36 2 °C)-97 8 °F (36 6 °C)] 97 2 °F (36 2 °C)  HR:  [61-68] 68  Resp:  [18] 18  BP: (113-154)/(75-93) 140/93  SpO2:  [97 %-100 %] 97 %  Temp (24hrs), Av 5 °F (36 4 °C), Min:97 2 °F (36 2 °C), Max:97 8 °F (36 6 °C)  Current: Temperature: (!) 97 2 °F (36 2 °C)    Physical Exam:     General: Awake, alert, cooperative, no distress  Neck:  Supple  No mass  No lymphadenopathy  Lungs: Expansion symmetric, no rales, no wheezing, respirations unlabored  Heart:  Regular rate and rhythm, S1 and S2 normal, no murmur  Abdomen: Soft, nondistended, non-tender, bowel sounds active all four quadrants, no masses, no organomegaly  Extremities: No edema  Left foot with dressing in place  Dressing is dry  No erythema/warmth beyond dressing  Minimal tenderness  Skin:  No rash  Neuro: Moves all extremities  Invasive Devices     Central Venous Catheter Line            CVC Central Lines 20 Double Right Internal jugular 8 days                Labs studies:   I have personally reviewed pertinent labs  Results from last 7 days   Lab Units 21  0602 20  0503 20  0507   POTASSIUM mmol/L 4 5 4 6 4 3   CHLORIDE mmol/L 104 103 104   CO2 mmol/L 25 29 26   BUN mg/dL 40* 36* 28*   CREATININE mg/dL 2 28* 2 28* 2 13*   EGFR ml/min/1 73sq m 31 31 33   CALCIUM mg/dL 9 1 8 5 8 6   AST U/L  --   --  25   ALT U/L  --   --  29   ALK PHOS U/L  --   --  116     Results from last 7 days   Lab Units 21  0602 20  0503 20  0507   WBC Thousand/uL 8 60 8 40 8 34   HEMOGLOBIN g/dL 9 5* 9 7* 9 4*   PLATELETS Thousands/uL 223 228 199           Imaging Studies:   I have personally reviewed pertinent imaging study reports and images in PACS  EKG, Pathology, and Other Studies:   I have personally reviewed pertinent reports

## 2021-01-02 NOTE — ASSESSMENT & PLAN NOTE
Lab Results   Component Value Date    HGBA1C 7 4 (H) 12/18/2020     Recent Labs     01/01/21  2104 01/02/21  0733 01/02/21  1107 01/02/21  1639   POCGLU 177* 81 153* 96   A1c at goal  Blood sugar stable  Perioperative blood sugar goal less than 180  Currently off Lantus and just on sliding scale insulin

## 2021-01-02 NOTE — ASSESSMENT & PLAN NOTE
GARY on CKD stage IV     GARY has resolved  Creatinine is stable and at baseline  Clinically no sign of pedal edema  He uses lasix prn and this is not needed at this time

## 2021-01-03 LAB
GLUCOSE SERPL-MCNC: 111 MG/DL (ref 65–140)
GLUCOSE SERPL-MCNC: 138 MG/DL (ref 65–140)
GLUCOSE SERPL-MCNC: 191 MG/DL (ref 65–140)
GLUCOSE SERPL-MCNC: 85 MG/DL (ref 65–140)

## 2021-01-03 PROCEDURE — 99231 SBSQ HOSP IP/OBS SF/LOW 25: CPT | Performed by: INTERNAL MEDICINE

## 2021-01-03 PROCEDURE — 99232 SBSQ HOSP IP/OBS MODERATE 35: CPT | Performed by: INTERNAL MEDICINE

## 2021-01-03 PROCEDURE — 82948 REAGENT STRIP/BLOOD GLUCOSE: CPT

## 2021-01-03 RX ORDER — DOCUSATE SODIUM 100 MG/1
100 CAPSULE, LIQUID FILLED ORAL 2 TIMES DAILY
Status: DISCONTINUED | OUTPATIENT
Start: 2021-01-03 | End: 2021-01-06 | Stop reason: HOSPADM

## 2021-01-03 RX ORDER — SENNOSIDES 8.6 MG
1 TABLET ORAL
Status: DISCONTINUED | OUTPATIENT
Start: 2021-01-03 | End: 2021-01-06 | Stop reason: HOSPADM

## 2021-01-03 RX ADMIN — SENNOSIDES 8.6 MG: 8.6 TABLET ORAL at 21:47

## 2021-01-03 RX ADMIN — ACETAMINOPHEN 975 MG: 325 TABLET ORAL at 01:16

## 2021-01-03 RX ADMIN — INSULIN LISPRO 1 UNITS: 100 INJECTION, SOLUTION INTRAVENOUS; SUBCUTANEOUS at 11:14

## 2021-01-03 RX ADMIN — GABAPENTIN 600 MG: 300 CAPSULE ORAL at 08:44

## 2021-01-03 RX ADMIN — LATANOPROST 1 DROP: 50 SOLUTION OPHTHALMIC at 21:48

## 2021-01-03 RX ADMIN — CEFAZOLIN SODIUM 1000 MG: 1 SOLUTION INTRAVENOUS at 13:21

## 2021-01-03 RX ADMIN — SERTRALINE HYDROCHLORIDE 50 MG: 50 TABLET ORAL at 08:44

## 2021-01-03 RX ADMIN — MELATONIN 3 MG: at 01:16

## 2021-01-03 RX ADMIN — ISOSORBIDE MONONITRATE 90 MG: 30 TABLET, EXTENDED RELEASE ORAL at 08:44

## 2021-01-03 RX ADMIN — HEPARIN SODIUM 5000 UNITS: 5000 INJECTION INTRAVENOUS; SUBCUTANEOUS at 08:43

## 2021-01-03 RX ADMIN — CEFAZOLIN SODIUM 1000 MG: 1 SOLUTION INTRAVENOUS at 05:22

## 2021-01-03 RX ADMIN — TAMSULOSIN HYDROCHLORIDE 0.4 MG: 0.4 CAPSULE ORAL at 21:47

## 2021-01-03 RX ADMIN — OXYCODONE HYDROCHLORIDE 5 MG: 5 TABLET ORAL at 05:22

## 2021-01-03 RX ADMIN — ASPIRIN 81 MG CHEWABLE TABLET 81 MG: 81 TABLET CHEWABLE at 08:44

## 2021-01-03 RX ADMIN — METOPROLOL SUCCINATE 100 MG: 50 TABLET, EXTENDED RELEASE ORAL at 08:44

## 2021-01-03 RX ADMIN — PANTOPRAZOLE SODIUM 40 MG: 40 TABLET, DELAYED RELEASE ORAL at 05:22

## 2021-01-03 RX ADMIN — OXYCODONE HYDROCHLORIDE 5 MG: 5 TABLET ORAL at 11:16

## 2021-01-03 RX ADMIN — DOCUSATE SODIUM 100 MG: 100 CAPSULE, LIQUID FILLED ORAL at 17:03

## 2021-01-03 RX ADMIN — OXYCODONE HYDROCHLORIDE 5 MG: 5 TABLET ORAL at 21:47

## 2021-01-03 RX ADMIN — CEFAZOLIN SODIUM 1000 MG: 1 SOLUTION INTRAVENOUS at 21:48

## 2021-01-03 RX ADMIN — DOCUSATE SODIUM 100 MG: 100 CAPSULE, LIQUID FILLED ORAL at 09:12

## 2021-01-03 RX ADMIN — HEPARIN SODIUM 5000 UNITS: 5000 INJECTION INTRAVENOUS; SUBCUTANEOUS at 21:48

## 2021-01-03 RX ADMIN — TRAMADOL HYDROCHLORIDE 50 MG: 50 TABLET, FILM COATED ORAL at 01:21

## 2021-01-03 NOTE — PLAN OF CARE
Problem: Potential for Falls  Goal: Patient will remain free of falls  Description: INTERVENTIONS:  - Assess patient frequently for physical needs  -  Identify cognitive and physical deficits and behaviors that affect risk of falls    -  [place] fall precautions as indicated by assessment   - Educate patient/family on patient safety including physical limitations  - Instruct patient to call for assistance with activity based on assessment  - Modify environment to reduce risk of injury  - Consider OT/PT consult to assist with strengthening/mobility  Outcome: Progressing     Problem: MUSCULOSKELETAL - ADULT  Goal: Maintain or return mobility to safest level of function  Description: INTERVENTIONS:  - Assess patient's ability to carry out ADLs; assess patient's baseline for ADL function and identify physical deficits which impact ability to perform ADLs (bathing, care of mouth/teeth, toileting, grooming, dressing, etc )  - Assess/evaluate cause of self-care deficits   - Assess range of motion  - Assess patient's mobility  - Assess patient's need for assistive devices and provide as appropriate  - Encourage maximum independence but intervene and supervise when necessary  - Involve family in performance of ADLs  - Assess for home care needs following discharge   - Consider OT consult to assist with ADL evaluation and planning for discharge  - Provide patient education as appropriate  Outcome: Progressing  Goal: Maintain proper alignment of affected body part  Description: INTERVENTIONS:  - Support, maintain and protect limb and body alignment  - Provide patient/ family with appropriate education  Outcome: Progressing     Problem: PAIN - ADULT  Goal: Verbalizes/displays adequate comfort level or baseline comfort level  Description: Interventions:  - Encourage patient to monitor pain and request assistance  - Assess pain using appropriate pain scale  - Administer analgesics based on type and severity of pain and evaluate response  - Implement non-pharmacological measures as appropriate and evaluate response  - Consider cultural and social influences on pain and pain management  - Notify physician/advanced practitioner if interventions unsuccessful or patient reports new pain  Outcome: Progressing     Problem: INFECTION - ADULT  Goal: Absence or prevention of progression during hospitalization  Description: INTERVENTIONS:  - Assess and monitor for signs and symptoms of infection  - Monitor lab/diagnostic results  - Monitor all insertion sites, i e  indwelling lines, tubes, and drains  - Monitor endotracheal if appropriate and nasal secretions for changes in amount and color  - [place] appropriate cooling/warming therapies per order  - Administer medications as ordered  - Instruct and encourage patient and family to use good hand hygiene technique  - Identify and instruct in appropriate isolation precautions for identified infection/condition  Outcome: Progressing  Goal: Absence of fever/infection during neutropenic period  Description: INTERVENTIONS:  - Monitor WBC    Outcome: Progressing     Problem: SAFETY ADULT  Goal: Patient will remain free of falls  Description: INTERVENTIONS:  - Assess patient frequently for physical needs  -  Identify cognitive and physical deficits and behaviors that affect risk of falls    -  [place] fall precautions as indicated by assessment   - Educate patient/family on patient safety including physical limitations  - Instruct patient to call for assistance with activity based on assessment  - Modify environment to reduce risk of injury  - Consider OT/PT consult to assist with strengthening/mobility  Outcome: Progressing  Goal: Maintain or return to baseline ADL function  Description: INTERVENTIONS:  -  Assess patient's ability to carry out ADLs; assess patient's baseline for ADL function and identify physical deficits which impact ability to perform ADLs (bathing, care of mouth/teeth, toileting, grooming, dressing, etc )  - Assess/evaluate cause of self-care deficits   - Assess range of motion  - Assess patient's mobility; develop plan if impaired  - Assess patient's need for assistive devices and provide as appropriate  - Encourage maximum independence but intervene and supervise when necessary  - Involve family in performance of ADLs  - Assess for home care needs following discharge   - Consider OT consult to assist with ADL evaluation and planning for discharge  - Provide patient education as appropriate  Outcome: Progressing  Goal: Maintain or return mobility status to optimal level  Description: INTERVENTIONS:  - Assess patient's baseline mobility status (ambulation, transfers, stairs, etc )    - Identify cognitive and physical deficits and behaviors that affect mobility  - Identify mobility aids required to assist with transfers and/or ambulation (gait belt, sit-to-stand, lift, walker, cane, etc )  - [place] fall precautions as indicated by assessment  - Record patient progress and toleration of activity level on Mobility SBAR; progress patient to next Phase/Stage  - Instruct patient to call for assistance with activity based on assessment  - Consider rehabilitation consult to assist with strengthening/weightbearing, etc   Outcome: Progressing     Problem: DISCHARGE PLANNING  Goal: Discharge to home or other facility with appropriate resources  Description: INTERVENTIONS:  - Identify barriers to discharge w/patient and caregiver  - Arrange for needed discharge resources and transportation as appropriate  - Identify discharge learning needs (meds, wound care, etc )  - Arrange for interpretive services to assist at discharge as needed  - Refer to Case Management Department for coordinating discharge planning if the patient needs post-hospital services based on physician/advanced practitioner order or complex needs related to functional status, cognitive ability, or social support system  Outcome: Progressing     Problem: Knowledge Deficit  Goal: Patient/family/caregiver demonstrates understanding of disease process, treatment plan, medications, and discharge instructions  Description: Complete learning assessment and assess knowledge base    Interventions:  - Provide teaching at level of understanding  - Provide teaching via preferred learning methods  Outcome: Progressing     Problem: Prexisting or High Potential for Compromised Skin Integrity  Goal: Skin integrity is maintained or improved  Description: INTERVENTIONS:  - Identify patients at risk for skin breakdown  - Assess and monitor skin integrity  - Assess and monitor nutrition and hydration status  - Monitor labs   - Assess for incontinence   - Turn and reposition patient  - Assist with mobility/ambulation  - Relieve pressure over bony prominences  - Avoid friction and shearing  - Provide appropriate hygiene as needed including keeping skin clean and dry  - Evaluate need for skin moisturizer/barrier cream  - Collaborate with interdisciplinary team   - Patient/family teaching  - Consider wound care consult   Outcome: Progressing

## 2021-01-03 NOTE — PROGRESS NOTES
Progress Note - Stevo Bledsoeharjit 1945, 66 y o  male MRN: [id number]    Unit/Bed#: E5 -01 Encounter: [id number]    Primary Care Provider: Aliyah Mccarthy MD   Date and time admitted to hospital: 12/18/2020 11:52 AM        * [facility] ([facility]  Assessment & Plan  · Management per primary service    - s/p RAY RESECTION FOOT (Left) by Podiatry 12/21/2020  - status post partial left 5th metatarsal resection due to + margins 12/31/2020    MSSA bacteremia  Assessment & Plan  · Related to foot infection  · WIthout endocarditis with rapid clearance  · Management per ID    -  IV cefepime until 1/4/21 per ID (14 days total)    Type 2 diabetes mellitus, with long-term current use of insulin [facility])  Assessment & Plan  Lab Results   Component Value Date    HGBA1C 7 4 (H) 12/18/2020     Recent Labs     01/02/21  2117 01/03/21  0751 01/03/21  1057 01/03/21  1613   POCGLU 138 85 191* 138   A1c at goal  Blood sugar stable  Perioperative blood sugar goal less than 180  Currently off Lantus and just on sliding scale insulin    Acute kidney injury superimposed on chronic kidney disease ([facility] )  Assessment & Plan  GARY on CKD stage IV  GARY has resolved  Creatinine is stable and at baseline  Clinically no sign of pedal edema  He uses lasix prn and this is not needed at this time    Hypertension with renal disease  Assessment & Plan  Continue metoprolol succinate 100 mg daily and imdur 90 mg daily      CAD (coronary artery disease)  Assessment & Plan  Stable  Tolerated his procedure without any cardiac complication  Continue  aspirin 81 mg daily, metoprolol succinate 100 mg daily, and Imdur 90 mg daily      VTE Pharmacologic Prophylaxis:   Pharmacologic: Heparin  Mechanical VTE Prophylaxis in Place: No    Time Spent for Care: 15 minutes  More than 50% of total time spent on counseling and coordination of care as described above      Current Length of Stay: 16 day(s)    Current Patient Status: Inpatient Code Status: Level 1 - Full Code      Subjective:   Patient was watching TV at the time of my examination  He denied pain or discomfort  Denies shortness of breath  Awaiting rehab    Objective:     Vitals:   Temp (24hrs), Av 8 °F (36 6 °C), Min:97 5 °F (36 4 °C), Max:98 2 °F (36 8 °C)    Temp:  [97 5 °F (36 4 °C)-98 2 °F (36 8 °C)] 97 5 °F (36 4 °C)  HR:  [62-72] 72  Resp:  [18] 18  BP: (108-132)/(66-91) 108/70  SpO2:  [97 %-99 %] 97 %  Body mass index is 28 1 kg/m²  Input and Output Summary (last 24 hours): Intake/Output Summary (Last 24 hours) at 1/3/2021 1842  Last data filed at 1/3/2021 0801  Gross per 24 hour   Intake --   Output 800 ml   Net -800 ml       Physical Exam:     Physical Exam  Constitutional:       Appearance: He is not ill-appearing or diaphoretic  HENT:      Head: Normocephalic and atraumatic  Nose: No rhinorrhea  Eyes:      General: No scleral icterus  Neck:      Musculoskeletal: Neck supple  Cardiovascular:      Rate and Rhythm: Regular rhythm  Heart sounds: No murmur  Pulmonary:      Effort: Pulmonary effort is normal  No respiratory distress  Breath sounds: No wheezing or rales  Abdominal:      General: Abdomen is flat  Palpations: Abdomen is soft  Musculoskeletal:         General: Deformity present  Right lower leg: No edema  Left lower leg: No edema  Skin:     General: Skin is warm and dry  Neurological:      Mental Status: He is alert  Mental status is at baseline     Psychiatric:         Mood and Affect: Mood normal          Behavior: Behavior normal       Comments: Pleasant mood       Additional Data:     Labs:    Results from last 7 days   Lab Units 21  0602 20  0503   WBC Thousand/uL 8 60 8 40   HEMOGLOBIN g/dL 9 5* 9 7*   HEMATOCRIT % 29 3* 30 1*   PLATELETS Thousands/uL 223 228   BANDS PCT %  --  2   NEUTROS PCT % 68  --    LYMPHS PCT % 21  --    LYMPHO PCT %  --  23   MONOS PCT % 7  --    MONO PCT %  --  7 EOS PCT % 2 2     Results from last 7 days   Lab Units 01/02/21  0602  12/28/20  0507   SODIUM mmol/L 138   < > 137   POTASSIUM mmol/L 4 5   < > 4 3   CHLORIDE mmol/L 104   < > 104   CO2 mmol/L 25   < > 26   BUN mg/dL 40*   < > 28*   CREATININE mg/dL 2 28*   < > 2 13*   ANION GAP mmol/L 9   < > 7   CALCIUM mg/dL 9 1   < > 8 6   ALBUMIN g/dL  --   --  2 7*   TOTAL BILIRUBIN mg/dL  --   --  0 23   ALK PHOS U/L  --   --  116   ALT U/L  --   --  29   AST U/L  --   --  25   GLUCOSE RANDOM mg/dL 94   < > 99    < > = values in this interval not displayed  Results from last 7 days   Lab Units 01/03/21  1613 01/03/21  1057 01/03/21  0751 01/02/21  2117 01/02/21  1639 01/02/21  1107 01/02/21  0733 01/01/21  2104 01/01/21  1533 01/01/21  1159 01/01/21  0754 12/31/20  2056   POC GLUCOSE mg/dl 138 191* 85 138 96 153* 81 177* 155* 150* 99 124                   * I Have Reviewed All Lab Data Listed Above  * Additional Pertinent Lab Tests Reviewed:  All Labs Within Last 24 Hours Reviewed    Imaging:    Imaging Reports Reviewed Today Include:  None  Recent Cultures (last 7 days):           Last 24 Hours Medication List:   Current Facility-Administered Medications   Medication Dose Route Frequency Provider Last Rate    ALPRAZolam  0 25 mg Oral HS PRN Jamal Zaragoza DPM      aspirin  81 mg Oral Daily Jamal Zaragoza DPM      cefazolin  1,000 mg Intravenous Q8H Delbert Hedrick MD 1,000 mg (01/03/21 1321)    docusate sodium  100 mg Oral BID Dede Cordero MD      fluticasone  1 spray Each Nare Daily Jamal Zaragoza DPM      gabapentin  600 mg Oral Daily Jamal Zaragoza DPM      heparin (porcine)  5,000 Units Subcutaneous Q12H [address] Jamal Zaragoza DPM      insulin lispro  1-5 Units Subcutaneous 4x Daily (AC & HS) Jamal Zaragoza DPM      isosorbide mononitrate  90 mg Oral Daily Jamal Zaragoza DPM      latanoprost  1 drop Both Eyes HS Jamal Zaragoza DPM      loperamide  2 mg Oral TID PRN Jamal Zaragoza DPM  melatonin  3 mg Oral HS PRN Jamal Nik, DPM      metoprolol succinate  100 mg Oral Daily Jamal Nik, DPM      ondansetron  4 mg Intravenous Q6H PRN Jamal Nik, DPM      oxyCODONE  5 mg Oral Q6H PRN Jamal Nik, DPM      pantoprazole  40 mg Oral Early Morning Jamal Nik, DPM      senna  1 tablet Oral HS Dede Cordero MD      sertraline  50 mg Oral Daily Jamal Nik, DPM      tamsulosin  0 4 mg Oral HS Jamal Nik, DPM      traMADol  50 mg Oral Q12H PRN Jamal Zaragoza, DPM          Today, Patient Was Seen By: Dede Cordero MD    ** Please Note: Dictation voice to text software may have been used in the creation of this document   **

## 2021-01-03 NOTE — PROGRESS NOTES
Progress Note - Infectious Disease   Rolene Lexi 66 y o  male MRN: [id number]  Unit/Bed#: E5 -01 Encounter: [id number]      Impression/Recommendations:  1  MSSA bacteremia  There was growth in only 1 out of 2 admission blood cultures  Given active soft tissue infection, will treat patient as probable true bacteremia  Source is most likely left foot infection  Patient is clinically improved  Bacteremia cleared  2D echo without vegetation     -continue high-dose IV cefazolin  -treat x 14 days post initial I&D, through 1/4      2  Left foot osteomyelitis   Primarily involving the 5th metatarsal but also the phalanx   Infection appeared to be polymicrobial although I suspect that MSSA is the primary invasive organism  Patient is now status post 5th ray amputation   Unfortunately his bone margin remained positive for osteomyelitis   He returned to the OR for additional resection, this time for surgical cure  -no further antibiotic needed for this  -local wound care per Podiatry     3   Chronic kidney disease-advanced   Renal function has been waxing and waning   Possibly a pre renal issue   Creatinine is stable  -monitor creatinine  -cefazolin dose adjusted  -volume management per SLIM  -avoid nephrotoxins     4  Chronic dysuria  With positive urine culture for BCG as expected post treatment for bladder cancer   The patient is now status post biopsy with mild chronic inflammation but AFB smears negative for invasive disease   AFB Culture from the biopsies negative thus far at 4 week   A new repeat urine AFB culture was collected on 12/30/2020 and is preliminarily negative   -follow-up AFB cultures  -ongoing follow-up with outpatient Urology and Infectious Disease after discharge     Discussed with patient in detail regarding the above plan      Antibiotics:  Cefazolin  Antibiotic 17  Postop #13/#3     Subjective:  Patient  is comfortable  Pain in foot minimal   Temperature stays down  No chills    He is tolerating antibiotic well  No nausea, vomiting or diarrhea  Objective:  Vitals:  Temp:  [97 8 °F (36 6 °C)-98 2 °F (36 8 °C)] 97 8 °F (36 6 °C)  HR:  [58-66] 66  Resp:  [18] 18  BP: (117-132)/(66-91) 132/91  SpO2:  [98 %-100 %] 99 %  Temp (24hrs), Av °F (36 7 °C), Min:97 8 °F (36 6 °C), Max:98 2 °F (36 8 °C)  Current: Temperature: 97 8 °F (36 6 °C)    Physical Exam:     General: Awake, alert, cooperative, no distress  Neck:  Supple  No mass  No lymphadenopathy  Lungs: Expansion symmetric, no rales, no wheezing, respirations unlabored  Heart:  Regular rate and rhythm, S1 and S2 normal, no murmur  Abdomen: Soft, nondistended, non-tender, bowel sounds active all four quadrants, no masses, no organomegaly  Extremities: Left foot wound with dressing in place  Dressing is dry  No erythema/warmth beyond dressing  Minimal tenderness   Skin:  No rash  Neuro: Moves all extremities  Invasive Devices     Central Venous Catheter Line            CVC Central Lines 20 Double Right Internal jugular 9 days                Labs studies:   I have personally reviewed pertinent labs  Results from last 7 days   Lab Units 21  0602 20  0503 20  0507   POTASSIUM mmol/L 4 5 4 6 4 3   CHLORIDE mmol/L 104 103 104   CO2 mmol/L 25 29 26   BUN mg/dL 40* 36* 28*   CREATININE mg/dL 2 28* 2 28* 2 13*   EGFR ml/min/1 73sq m 31 31 33   CALCIUM mg/dL 9 1 8 5 8 6   AST U/L  --   --  25   ALT U/L  --   --  29   ALK PHOS U/L  --   --  116     Results from last 7 days   Lab Units 21  0602 20  0503 20  0507   WBC Thousand/uL 8 60 8 40 8 34   HEMOGLOBIN g/dL 9 5* 9 7* 9 4*   PLATELETS Thousands/uL 223 228 199           Imaging Studies:   I have personally reviewed pertinent imaging study reports and images in PACS  EKG, Pathology, and Other Studies:   I have personally reviewed pertinent reports

## 2021-01-03 NOTE — PROGRESS NOTES
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 - Gonzalez 3  - s/p partial 5th ray resection (DOS 2020), with residual OM   - s/p partial 5th ray resection (DOS 2020)  2  Left foot cellulitis  3  MSSA bacteremia, POA  4  Insulin Dependent Diabetes mellitus  5  Coronary arterial disease  6  Peripheral arterial disease  7  Hypertension  8  Chronic Kidney Disease stage 4      PLAN:    · Dressings to surgical site remain clean dry and intact  No clinical signs of infection are appreciated today  · Patient will likely be cleared for discharge after completing course of IV abx and finding appropriate placement  · Continue IV Ancef per infectious disease through 2021  · Continue NWB to LLE  · Cm on board for placement  · Appreciate recommendations from consulting services  SUBJECTIVE:     The patient was seen, evaluated, and assessed at bedside today  The patient was awake, alert, and in no acute distress  No acute events overnight  The patient reports minimal pain  Patient denies N/V/F/chills/SOB/CP  OBJECTIVE:     Vitals:   /91 (BP Location: Right arm)   Pulse 66   Temp 97 8 °F (36 6 °C) (Temporal)   Resp 18   Wt 105 kg (230 lb 13 2 oz)   SpO2 99%   BMI 28 10 kg/m²     Temp (24hrs), Av °F (36 7 °C), Min:97 8 °F (36 6 °C), Max:98 2 °F (36 8 °C)      Physical Exam:     General:  Alert, cooperative, and in no distress  Lower extremity exam:  Cardiovascular status at baseline  Neurological status at baseline  Musculoskeletal status at baseline  No calf tenderness noted  Stable surgical site with skin edges well coapted and sutures remaining intact  No clinical signs of acute infection at this time      Additional Data: Labs:    Results from last 7 days   Lab Units 01/02/21  0602   WBC Thousand/uL 8 60   HEMOGLOBIN g/dL 9 5*   HEMATOCRIT % 29 3*   PLATELETS Thousands/uL 223   NEUTROS PCT % 68   LYMPHS PCT % 21   MONOS PCT % 7   EOS PCT % 2     Results from last 7 days   Lab Units 01/02/21  0602  12/28/20  0507   POTASSIUM mmol/L 4 5   < > 4 3   CHLORIDE mmol/L 104   < > 104   CO2 mmol/L 25   < > 26   BUN mg/dL 40*   < > 28*   CREATININE mg/dL 2 28*   < > 2 13*   CALCIUM mg/dL 9 1   < > 8 6   ALK PHOS U/L  --   --  116   ALT U/L  --   --  29   AST U/L  --   --  25    < > = values in this interval not displayed  * I Have Reviewed All Lab Data Listed Above  Recent Cultures (last 7 days):               Imaging: I have personally reviewed pertinent films in PACS  EKG, Pathology, and Other Studies: I have personally reviewed pertinent reports  ** Please Note: Portions of the record may have been created with voice recognition software  Occasional wrong word or "sound a like" substitutions may have occurred due to the inherent limitations of voice recognition software  Read the chart carefully and recognize, using context, where substitutions have occurred   **

## 2021-01-03 NOTE — ASSESSMENT & PLAN NOTE
Lab Results   Component Value Date    HGBA1C 7 4 (H) 12/18/2020     Recent Labs     01/02/21  2117 01/03/21  0751 01/03/21  1057 01/03/21  1613   POCGLU 138 85 191* 138   A1c at goal  Blood sugar stable  Perioperative blood sugar goal less than 180  Currently off Lantus and just on sliding scale insulin

## 2021-01-04 LAB
FLUAV RNA RESP QL NAA+PROBE: NEGATIVE
FLUBV RNA RESP QL NAA+PROBE: NEGATIVE
GLUCOSE SERPL-MCNC: 112 MG/DL (ref 65–140)
GLUCOSE SERPL-MCNC: 140 MG/DL (ref 65–140)
GLUCOSE SERPL-MCNC: 147 MG/DL (ref 65–140)
GLUCOSE SERPL-MCNC: 165 MG/DL (ref 65–140)
RSV RNA RESP QL NAA+PROBE: NEGATIVE
SARS-COV-2 RNA RESP QL NAA+PROBE: NEGATIVE

## 2021-01-04 PROCEDURE — 97530 THERAPEUTIC ACTIVITIES: CPT

## 2021-01-04 PROCEDURE — 99223 1ST HOSP IP/OBS HIGH 75: CPT | Performed by: PHYSICAL MEDICINE & REHABILITATION

## 2021-01-04 PROCEDURE — 97535 SELF CARE MNGMENT TRAINING: CPT

## 2021-01-04 PROCEDURE — 99232 SBSQ HOSP IP/OBS MODERATE 35: CPT | Performed by: INTERNAL MEDICINE

## 2021-01-04 PROCEDURE — 0241U HB NFCT DS VIR RESP RNA 4 TRGT: CPT | Performed by: STUDENT IN AN ORGANIZED HEALTH CARE EDUCATION/TRAINING PROGRAM

## 2021-01-04 PROCEDURE — 97116 GAIT TRAINING THERAPY: CPT

## 2021-01-04 PROCEDURE — 82948 REAGENT STRIP/BLOOD GLUCOSE: CPT

## 2021-01-04 RX ADMIN — SENNOSIDES 8.6 MG: 8.6 TABLET ORAL at 21:18

## 2021-01-04 RX ADMIN — CEFAZOLIN SODIUM 1000 MG: 1 SOLUTION INTRAVENOUS at 04:42

## 2021-01-04 RX ADMIN — ISOSORBIDE MONONITRATE 90 MG: 30 TABLET, EXTENDED RELEASE ORAL at 08:54

## 2021-01-04 RX ADMIN — CEFAZOLIN SODIUM 1000 MG: 1 SOLUTION INTRAVENOUS at 21:19

## 2021-01-04 RX ADMIN — ASPIRIN 81 MG CHEWABLE TABLET 81 MG: 81 TABLET CHEWABLE at 08:54

## 2021-01-04 RX ADMIN — DOCUSATE SODIUM 100 MG: 100 CAPSULE, LIQUID FILLED ORAL at 17:39

## 2021-01-04 RX ADMIN — TAMSULOSIN HYDROCHLORIDE 0.4 MG: 0.4 CAPSULE ORAL at 21:18

## 2021-01-04 RX ADMIN — HEPARIN SODIUM 5000 UNITS: 5000 INJECTION INTRAVENOUS; SUBCUTANEOUS at 08:54

## 2021-01-04 RX ADMIN — SERTRALINE HYDROCHLORIDE 50 MG: 50 TABLET ORAL at 08:58

## 2021-01-04 RX ADMIN — PANTOPRAZOLE SODIUM 40 MG: 40 TABLET, DELAYED RELEASE ORAL at 05:04

## 2021-01-04 RX ADMIN — METOPROLOL SUCCINATE 100 MG: 50 TABLET, EXTENDED RELEASE ORAL at 08:54

## 2021-01-04 RX ADMIN — HEPARIN SODIUM 5000 UNITS: 5000 INJECTION INTRAVENOUS; SUBCUTANEOUS at 21:18

## 2021-01-04 RX ADMIN — INSULIN LISPRO 1 UNITS: 100 INJECTION, SOLUTION INTRAVENOUS; SUBCUTANEOUS at 21:25

## 2021-01-04 RX ADMIN — DOCUSATE SODIUM 100 MG: 100 CAPSULE, LIQUID FILLED ORAL at 08:58

## 2021-01-04 RX ADMIN — TRAMADOL HYDROCHLORIDE 50 MG: 50 TABLET, FILM COATED ORAL at 23:30

## 2021-01-04 RX ADMIN — TRAMADOL HYDROCHLORIDE 50 MG: 50 TABLET, FILM COATED ORAL at 11:25

## 2021-01-04 RX ADMIN — GABAPENTIN 600 MG: 300 CAPSULE ORAL at 08:58

## 2021-01-04 RX ADMIN — OXYCODONE HYDROCHLORIDE 5 MG: 5 TABLET ORAL at 04:42

## 2021-01-04 RX ADMIN — CEFAZOLIN SODIUM 1000 MG: 1 SOLUTION INTRAVENOUS at 12:42

## 2021-01-04 RX ADMIN — LATANOPROST 1 DROP: 50 SOLUTION OPHTHALMIC at 21:26

## 2021-01-04 NOTE — PLAN OF CARE
Problem: OCCUPATIONAL THERAPY ADULT  Goal: Performs self-care activities at highest level of function for planned discharge setting  See evaluation for individualized goals  Description: Treatment Interventions: ADL retraining, Functional transfer training, UE strengthening/ROM, Endurance training, Patient/family training, Equipment evaluation/education, Compensatory technique education, Energy conservation, Activityengagement          See flowsheet documentation for full assessment, interventions and recommendations  1/4/2021 1213 by Laurence Hunter OT  Outcome: Progressing  Note: Limitation: Decreased ADL status, Decreased endurance, Decreased self-care trans, Decreased high-level ADLs, Decreased Safe judgement during ADL  Prognosis: Good  Assessment: Pt seen for OT treatment session focusing on functional activity tolerance, ADLs, and functional mobility/transfers  Pt alert and cooperative throughout session  Of note, pt s/p partial 5th met resection since last OT treatment session (DOS: 12/31/2020)  Per podiatry, pt to continue with NWB L LE  Pt seated OOB in chair at start of session  Sit>stand transfers completed with Min A with increased assist required to initiate forward weight shift from surface for sit>stand  Min A required for functional mobility with cues for adherence to WBS (NWB L LE)  Pt with decreased compliance to WBS (50% of time) with increased distances 2* increased fatigue, requiring seated prolonged rest break  Min A required for controlled descent to surface for stand>sit with cues for hand placement  Toileting tasks simulated with Min A with CGA required for clothing management up/down 2* decreased dynamic standing balance demonstrated  Grooming tasks completed with Supervision with setup required and increased time to complete  Dressing tasks completed w/ Min A  Pt able to don/doff R sock and thread B/L LEs into underwear with Supervision   Min A required for balance when standing to pull underwear over hips with cues for WBS during task  Pt seated OOB in chair at end of session with call bell and phone within reach  All needs met and pt reports no further questions for OT at this time  Goals from initial OT eval remain appropriate, will extend by 10 days  Continue to recommend STR when medically cleared  OT to follow pt on caseload  OT Discharge Recommendation: Post-Acute Rehabilitation Services  OT - OK to Discharge: Yes(when medically cleared to rehab)    1/4/2021 1213 by Barbara Multani OT  Outcome: Progressing  Note: Limitation: Decreased ADL status, Decreased endurance, Decreased self-care trans, Decreased high-level ADLs, Decreased Safe judgement during ADL  Prognosis: Good  Assessment: Pt seen for OT treatment session focusing on functional activity tolerance, ADLs, and functional mobility/transfers  Pt alert and cooperative throughout session  Of note, pt s/p partial 5th met resection since last OT treatment session (DOS: 12/31/2020)  Per podiatry, pt to continue with NWB L LE  Pt seated OOB in chair at start of session  Sit>stand transfers completed with Min A with increased assist required to initiate forward weight shift from surface for sit>stand  Min A required for functional mobility with cues for adherence to WBS (NWB L LE)  Pt with decreased compliance to WBS (50% of time) with increased distances 2* increased fatigue, requiring seated prolonged rest break  Min A required for controlled descent to surface for stand>sit with cues for hand placement  Toileting tasks simulated with Min A with CGA required for clothing management up/down 2* decreased dynamic standing balance demonstrated  Grooming tasks completed with Supervision with setup required and increased time to complete  Dressing tasks completed w/ Min A  Pt able to don/doff R sock and thread B/L LEs into underwear with Supervision   Min A required for balance when standing to pull underwear over hips with cues for WBS during task  Pt seated OOB in chair at end of session with call bell and phone within reach  All needs met and pt reports no further questions for OT at this time  Goals from initial OT eval remain appropriate, will extend by 10 days  Continue to recommend STR when medically cleared  OT to follow pt on caseload        OT Discharge Recommendation: Post-Acute Rehabilitation Services  OT - OK to Discharge: Yes(when medically cleared to rehab)

## 2021-01-04 NOTE — OCCUPATIONAL THERAPY NOTE
[address] Progress Note     Patient Name: Messi Handley  Today's Date: 1/4/2021  Problem List  Principal Problem:    Subacute osteomyelitis of left foot ([facility] )  Active Problems:    CAD (coronary artery disease)    Hypertension with renal disease    Type 2 diabetes mellitus, with long-term current use of insulin ([facility] )    Acute kidney injury superimposed on chronic kidney disease ([facility] [place] )    Anemia of chronic disease    MSSA bacteremia              01/04/21 1149   OT Last Visit   OT Visit Date 01/04/21   Note Type   Note Type Treatment   Restrictions/Precautions   Weight Bearing Precautions Per Order Yes   LLE Weight Bearing Per Order NWB   Other Precautions Fall Risk;Pain;WBS   General   Response to Previous Treatment Patient with no complaints from previous session   Lifestyle   Autonomy At baseline, pt was I w/ ADLs, IADLs, and functional mobility/transfers w/o use of AD and reports 3 falls PTA 2* balance deficits  Reciprocal Relationships Spouse   Service to Others Retired   Intrinsic Gratification Watching TV   Pain Assessment   Pain Assessment Tool 0-10   Pain Score 5   Pain Location/Orientation Orientation: Left; Location: Foot   Patient's Stated Pain Goal No pain   Hospital Pain Intervention(s) Repositioned; Ambulation/increased activity; Emotional support; Rest   Multiple Pain Sites No   ADL   Where Assessed Chair   Grooming Assistance 5  Supervision/Setup   Grooming Deficit Setup;Supervision/safety; Increased time to complete   LB Dressing Assistance 4  Minimal Assistance   LB Dressing Deficit Setup;Steadying; Requires assistive device for steadying;Verbal cueing;Supervision/safety; Increased time to complete; Don/doff R sock; Thread RLE into underwear; Thread LLE into underwear;Pull up over hips   LB Dressing Comments Dressing tasks completed w/ Min A  Pt able to don/doff R sock and thread B/L LEs into underwear with Supervision   Min A required for balance when standing to pull underwear over hips with cues for WBS during task  Toileting Assistance  4  Minimal Assistance   Toileting Deficit Setup;Steadying;Verbal cueing;Supervison/safety; Increased time to complete;Clothing management up;Clothing management down;Perineal hygiene   Toileting Comments Toileting tasks simulated with Min A with CGA required for clothing management up/down 2* decreased dynamic standing balance demonstrated  Functional Standing Tolerance   Time 2-3 mins   Activity Dynamic standing balance activity   Comments Increased fatigue with increased time in standing, demonstrating decreased compliance to WBS  Seated rest break required after 2-3 mins of standing activity   Bed Mobility   Supine to Sit Unable to assess   Sit to Supine Unable to assess   Additional Comments Pt seated OOB in chair at end of session with call bell and phone within reach  All needs met and pt reports no further questions for OT at this time  Transfers   Sit to Stand 4  Minimal assistance   Additional items Assist x 1; Increased time required;Verbal cues   Stand to Sit 4  Minimal assistance   Additional items Assist x 1; Armrests; Increased time required;Verbal cues   Toilet transfer 4  Minimal assistance   Additional items Assist x 1; Increased time required;Verbal cues;Standard toilet   Additional Comments Cues for safe technique and hand placement   Functional Mobility   Functional Mobility 4  Minimal assistance   Additional Comments Assist x1; Pt able to maintain NWB R LE 50% of time   Additional items Rolling walker   Cognition   Overall Cognitive Status [facility]   Arousal/Participation Alert; Cooperative   Attention Within functional limits   Orientation Level Oriented X4   Memory Decreased recall of precautions   Following Commands Follows one step commands without difficulty   Activity Tolerance   Activity Tolerance Patient tolerated treatment well;Patient limited by pain   Medical Staff [facility] [place] Mah[place], RN; Fab Sharp, PT   Assessment   Assessment Pt seen for OT treatment session focusing on functional activity tolerance, ADLs, and functional mobility/transfers  Pt alert and cooperative throughout session  Of note, pt s/p partial 5th met resection since last OT treatment session (DOS: 12/31/2020)  Per podiatry, pt to continue with NWB L LE  Pt seated OOB in chair at start of session  Sit>stand transfers completed with Min A with increased assist required to initiate forward weight shift from surface for sit>stand  Min A required for functional mobility with cues for adherence to WBS (NWB L LE)  Pt with decreased compliance to WBS (50% of time) with increased distances 2* increased fatigue, requiring seated prolonged rest break  Min A required for controlled descent to surface for stand>sit with cues for hand placement  Toileting tasks simulated with Min A with CGA required for clothing management up/down 2* decreased dynamic standing balance demonstrated  Grooming tasks completed with Supervision with setup required and increased time to complete  Dressing tasks completed w/ Min A  Pt able to don/doff R sock and thread B/L LEs into underwear with Supervision  Min A required for balance when standing to pull underwear over hips with cues for WBS during task  Pt seated OOB in chair at end of session with call bell and phone within reach  All needs met and pt reports no further questions for OT at this time  Goals from initial OT eval remain appropriate, will extend by 10 days  Continue to recommend STR when medically cleared  OT to follow pt on caseload  Plan   Treatment Interventions ADL retraining;Functional transfer training;UE strengthening/ROM; Endurance training;Patient/family training;Equipment evaluation/education; Compensatory technique education; Activityengagement   Goal Expiration Date 01/14/21   OT Treatment Day 3   OT Frequency 3-5x/wk   Recommendation   OT Discharge Recommendation Post-Acute Rehabilitation Services   OT - OK to Discharge Yes  (when medically cleared to rehab)   Barthel Index   Feeding 10   Bathing 0   Grooming Score 5   Dressing Score 5   Bladder Score 10   Bowels Score 10   Toilet Use Score 5   Transfers (Bed/Chair) Score 10   Mobility (Level Surface) Score 0   Stairs Score 0   Barthel Index Score 55   Modified Medina Scale   Modified Medina Scale 4       Hannah Atkins, OTR/L

## 2021-01-04 NOTE — SOCIAL WORK
Pt is medically clear for discharge and is awaiting STR placement  Pt top choices are [facility] and [facility]  [address] do not have a current bed available   [facility] is awaiting PM&R consult do determine decision  SW will continue to follow

## 2021-01-04 NOTE — PHYSICAL THERAPY NOTE
PT PROGRESS NOTE    Name: Molly Douglas  AGE: 66 y o  MRN: [id number]  LENGTH OF STAY: 17             01/04/21 1150   PT Last Visit   PT Visit Date 01/04/21   Note Type   Note Type Treatment   Pain Assessment   Pain Score 4   Pain Location/Orientation Orientation: Left; Location: Foot   Hospital Pain Intervention(s) Medication (See MAR); Repositioned; Ambulation/increased activity; Emotional support; Rest   Restrictions/Precautions   LLE Weight Bearing Per Order NWB   Other Precautions Fall Risk;Pain;WBS  (NWB L foot)   General   Chart Reviewed Yes   Response to Previous Treatment Patient with no complaints from previous session  Family/Caregiver Present No   Cognition   Overall Cognitive Status WFL   Arousal/Participation Alert; Cooperative   Attention Within functional limits   Orientation Level Oriented to person;Oriented to place;Oriented to time   Following Commands Follows one step commands without difficulty   Subjective   Subjective Pt agreeable to therapy  Bed Mobility   Supine to Sit Unable to assess   Sit to Supine Unable to assess   Additional Comments Pt OOB in chair pre & post session   Transfers   Sit to Stand 4  Minimal assistance   Additional items Assist x 1; Armrests; Increased time required;Verbal cues   Stand to Sit 4  Minimal assistance   Additional items Assist x 1; Armrests; Increased time required;Verbal cues   Toilet transfer 4  Minimal assistance   Additional items Assist x 1; Increased time required;Verbal cues; Other  (BSC over toilet)   Additional Comments cues for hand placement & to maintain NWB to L foot    Ambulation/Elevation   Gait pattern Short stride; Step to;Excessively slow  (hopping w/ RW; difficulty maintaining NWB to L foot )   Gait Assistance 4  Minimal assist   Additional items Assist x 1;Verbal cues; Tactile cues   Assistive Device Rolling walker   Distance 15'x1 + 10'x1 + 10'x1; pt require seated rests in between amb   Balance   Static Sitting Fair +   Dynamic Sitting Fair Static Standing Fair -  (w/ RW)   Dynamic Standing Poor +  (w/ RW)   Ambulatory Poor +  (w/ RW)   Endurance Deficit   Endurance Deficit Yes   Endurance Deficit Description fatigue; pain; weakness   Activity Tolerance   Activity Tolerance Patient limited by fatigue;Patient limited by pain;Treatment limited secondary to medical complications (Comment)   Medical Staff Made Aware ADAL Crow   Nurse Made Aware RN [facility]   Assessment   Prognosis Good   Problem List Decreased strength;Decreased endurance; Impaired balance;Decreased mobility; Decreased safety awareness;Pain;Orthopedic restrictions   Assessment Pt seen for PT per POC  Updated PT goals stated below  Improved mobility & activity tolerance noted this tx session  Pt require minAx1 for transfers & amb w/ RW + cues for techniques & safety  See above levels of assistance required for all functional tasks  Gait deviations as above but no gross LOB noted  Pt w/ difficulty maintaining NWB to L foot 50% of the time especially as pt fatigues  Pt require seated rests in between amb trials 2* to fatigue & weakness  No dizziness & SOB reported t/o session  Nsg staff most recent vital signs as follows: /66 (BP Location: Right arm)   Pulse 76   Temp 98 1 °F (36 7 °C) (Temporal)   Resp 18   Wt 104 kg (228 lb 6 3 oz)   SpO2 98%   BMI 27 80 kg/m²   Will continue PT per POC  At end of session, pt remain OOB in chair w/o issues, call bell & phone in reach  Fall precautions reinforced w/ good understanding  Will continue to recommend inpt rehab at D/C  CM to follow  Nsg staff to continue to mobilized pt (OOB in chair for all meals & ambulate in room/unit) as tolerated to prevent decline in function  Nsg notified      Barriers to Discharge Inaccessible home environment;Decreased caregiver support   Goals   Patient Goals to go to rehab   STG Expiration Date 01/18/21   Short Term Goal #1 Goals to be met in 14 days; pt will be able to: 1) inc strength & balance by 1/2 grade to improve overall functional mobility & dec fall risk; 2) inc bed mobility to modified I for pt to be able to get in/OOB safely w/ proper techniques 100% of the time, to dec caregiver burden & safely function at home; 3) inc transfers to S for pt to transition safely from one surface to another w/o % of the time, to dec caregiver burden & safely function at home; 4) inc amb w/ RW approx  150' w/ S for pt to ambulate household distances w/o any % of the time, to dec caregiver burden & safely function at home; 5) negotiate stairs w/ S w/ appropriate method for inc safety during stair mgt inside/outside of home & dec caregiver burden; 6) pt/caregiver ed   PT Treatment Day 5   Plan   Treatment/Interventions Functional transfer training;LE strengthening/ROM; Therapeutic exercise; Endurance training;Patient/family training;Bed mobility;Gait training;Spoke to nursing;OT;Spoke to case management   Progress Slow progress, decreased activity tolerance   PT Frequency Other (Comment)  (3-5x/wk)   Recommendation   PT Discharge Recommendation Post-Acute Rehabilitation Services   Equipment Recommended Walker  (RW)   PT - OK to Discharge Yes  (to inpt rehab when medically cleared)   Roge Villela, PT

## 2021-01-04 NOTE — CASE MANAGEMENT
This case management assistant (CARLO) met with the patient and reviewed her medicare rights with him  He did not have any questions and verbally consented to his medicare rights for me  The patient had questions about where he was d/c'ing to  This CMA informed the assigned   Assigned  stated that she would check in with the patient once she had a place with an open bed for him

## 2021-01-04 NOTE — PLAN OF CARE
Problem: PHYSICAL THERAPY ADULT  Goal: Performs mobility at highest level of function for planned discharge setting  See evaluation for individualized goals  Description: Treatment/Interventions: Functional transfer training, LE strengthening/ROM, Elevations, Therapeutic exercise, Endurance training, Patient/family training, Equipment eval/education, Bed mobility, Gait training, Spoke to nursing, Spoke to case management, OT  Equipment Recommended: Mat Prader       See flowsheet documentation for full assessment, interventions and recommendations  Outcome: Progressing  Note: Prognosis: Good  Problem List: Decreased strength, Decreased endurance, Impaired balance, Decreased mobility, Decreased safety awareness, Pain, Orthopedic restrictions  Assessment: Pt seen for PT per POC  Updated PT goals stated below  Improved mobility & activity tolerance noted this tx session  Pt require minAx1 for transfers & amb w/ RW + cues for techniques & safety  See above levels of assistance required for all functional tasks  Gait deviations as above but no gross LOB noted  Pt w/ difficulty maintaining NWB to L foot 50% of the time especially as pt fatigues  Pt require seated rests in between amb trials 2* to fatigue & weakness  No dizziness & SOB reported t/o session  Nsg staff most recent vital signs as follows: /66 (BP Location: Right arm)   Pulse 76   Temp 98 1 °F (36 7 °C) (Temporal)   Resp 18   Wt 104 kg (228 lb 6 3 oz)   SpO2 98%   BMI 27 80 kg/m²   Will continue PT per POC  At end of session, pt remain OOB in chair w/o issues, call bell & phone in reach  Fall precautions reinforced w/ good understanding  Will continue to recommend inpt rehab at D/C  CM to follow  Nsg staff to continue to mobilized pt (OOB in chair for all meals & ambulate in room/unit) as tolerated to prevent decline in function  Nsg notified     Barriers to Discharge: Inaccessible home environment, Decreased caregiver support  Barriers to Discharge Comments: reports wife cannot assist much at home  PT Discharge Recommendation: Post-Acute Rehabilitation Services     PT - OK to Discharge: Yes(to inpt rehab when medically cleared)    See flowsheet documentation for full assessment

## 2021-01-04 NOTE — CONSULTS
PHYSICAL MEDICINE AND REHABILITATION CONSULT NOTE  Nava Manuel 66 y o  male MRN: [id number]  Unit/Bed#: E5 -95 Encounter: [id number]    Requested by (Physician/Service): Bekah Metcalf DPM  Reason for Consultation:  Assessment of rehabilitation needs  Chief Complaint:  Weakness     Assessment and Recommendations:  Naav Manuel is a 66 y o  male with PMH of anemia, CAD (s/p stenting Oct 2019, balloon angioplasty), bladder cancer, CKD Stage 4, T2DM, HTN/Hypotension, Insomnia, loss of hearing, allergies, TIA, PAD who presented to [facility] with L 5th metatarsal cellulitis and osteomyelitis ultimately requiring 5th ray resection on 12/11 and then revision for positive margins on 12/31 with Dr Vj Garnett  PM&R consulted for rehabilitation recommendations  Impairments:  Impaired functional mobility and ability to perform ADL's    Recommendations:  - Continue PT/OT while inpatient  - Covid-19 Testing: [facility] rehabilitation units currently require testing within 48 hours of potential admission  Please re-test if needed  Rehab Recs:    - Pt is unable to safely return home until he is at the  modified-independent functional level (0% assistance with a device)  -Based on the patient's prior and current functional status, and ability to tolerate 3 hrs of therapy per day, we believe he is an excellent acute inpatient rehab candidate (3 hours of therapy a day, medical and nursing care, highest level of rehab)    In addition to therapies, he will require medical management of CKD, type II diabetes, post-op anemia on top of anemia of kidney disease, CAD s/p stenting and angioplasty, labile BP monitoring of incision/local care, risk of R foot injury/wound with neuropathy, history of arthritis with limitations of RLE given multiple knee surgeries, peripheral neuropathy, will need close monitoring for any recurrent infection pending return of cultures from revision, and pain control and prevention of delirium/VTE     - He has great functional prognosis, and I anticipate a short length of stay 7-10 days      - His first choice is [facility]  SW has also brought up the [facility]  Med Recs:  #Pain:  Tylenol PRN, oxycodone 5mg Q6hr PRN  Tramadol 50mg Q12hr PRN  #Bowel: On scheduled colace and senna  Last BM 1/3 per patient report  #Bladder: Voiding and continent   #Skin/Pressure Injury Prevention: Turn Q2hr in bed, with weight shifts [id number] in wheelchair  #DVT Prophylaxis: Heparin Q12hr only  #GI Prophylaxis: Protonix     Thank you for allowing the PM&R service to participate in the care of this patient  We will continue to follow Mace Faisal progress with you  Please do not hesitate to call with questions or concerns    History of Present Illness:  Masha Johnson is a 66 y o  male with PMH of anemia, CAD (s/p stenting Oct 2019, balloon angioplasty), bladder cancer, CKD Stage 4, T2DM, HTN/Hypotension, Insomnia, loss of hearing, allergies, TIA, PAD who presented to [facility] on 12/18 with L 5th metatarsal OM and cellulitis  Confirmed on MRI  He was placed on IV ancef/vanc and PO metronidazole  Culture grew Enterobacter and Staph Aureus, and he was transitioned to Cefepime/Flagyl on 12/20  He was taken for 5th ray resection on 12/11 by Dr Ximena Macario with podiatry - made NWB  Course c/b GARY on CKD - felt to be pre-renal, lasix was held with improvement in his crea (baseline 2 4)  LEADs performed and showed good biphasic/triphasic waveforms throughout into the distal tibial vasculature  Echo performed showed no evidence of valvular vegetations concerning for endocarditis  PICC line was placed 12/24  Final intra-op cultures with growth in broth only  Flagyl was subsequently discontinued  Unfortunately, margins were positive, and he had to be taken back to the OR 12/31 for revision with Dr Ximena Macario (partial 5th MT resection of L foot)   His course was complicated by MSSA bacteremia (felt to be transient, and treated for 14 days through  with IV Cefazolin  After further revision, IV Cefepime for ostemyelitis was discontinued by ID  Feeling well at this time  Reports BM within last 24 hours  Feels generally weak and struggling to hope on one foot given history of R knee surgeries  He is hoping to be more independent prior to discharge home - it's just him and his wife  He denies any CP, SOB, fevers, chills, lightheadedness, dizziness, N/V, abdominal pain  His pain from surgery is well controlled on his current regimen  He uses pain medication only sparingly  Review of Systems: 10 point ROS negative except for what is noted in HPI    CURRENT GAP IN FUNCTION     Prior to Admission:     Independent with ADLs and functional mobility  FUNCTIONAL STATUS:  Physical Therapy:  Hunter with bed mobility, Hunter with ambulation 15' with seated rest breaks required     Occupational Therapy:  Sup grooming, Hunter LB dressing, Hunter toileting, Hunter functional mobility        Social History:    Social History     Socioeconomic History    Marital status: /Civil Union     Spouse name: None    Number of children: None    Years of education: None    Highest education level: None   Occupational History    None   Social Needs    Financial resource strain: None    Food insecurity     Worry: None     Inability: None    Transportation needs     Medical: None     Non-medical: None   Tobacco Use    Smoking status: Former Smoker     Quit date: 1970     Years since quittin 0    Smokeless tobacco: Never Used   Substance and Sexual Activity    Alcohol use: Yes     Frequency: 2-3 times a week     Drinks per session: 1 or 2     Binge frequency: Never    Drug use: Not Currently     Types: Marijuana     Comment: quit 2019 had medical marijuana    Sexual activity: Not Currently   Lifestyle    Physical activity     Days per week: None     Minutes per session: None    Stress: None   Relationships    Social connections     Talks on phone: None     Gets together: None     Attends [employer]ism service: None     Active member of club or organization: None     Attends meetings of clubs or organizations: None     Relationship status: None    Intimate partner violence     Fear of current or ex partner: None     Emotionally abused: None     Physically abused: None     Forced sexual activity: None   Other Topics Concern    None   Social History Narrative    Consumes 1 cup of coffee and 1 soda per day        Celeste Severin is  and Lives with: lives with their spouse  He lives in [facility]  The living area: Multi-level, 1/2 bath on main floor, bed/bath upstairs  Stairglide to 2nd floor  Equipment in home: [employer] and prollie and stair glide  There is a Ramp to enter the home  Patient/family's goals: Return to previous home/apartment  He is a retired , and his wife is retired as well       Family History:    Family History   Problem Relation Age of Onset    Diabetes Mother     Heart disease Mother     Other Mother         High blood pressure    Heart disease Father     Diabetes Sister     Other Sister         High blood pressure    Kidney disease Sister     Heart disease Brother     Other Brother         High blood pressure         MEDICATIONS:     Current Facility-Administered Medications:     ALPRAZolam (XANAX) tablet 0 25 mg, 0 25 mg, Oral, HS PRN, Pegge Wilfrid, DPM, 0 25 mg at 12/28/20 2142    aspirin chewable tablet 81 mg, 81 mg, Oral, Daily, Vivi Sanders, DPM, 81 mg at 01/04/21 0854    ceFAZolin (ANCEF) IVPB (premix in dextrose) 1,000 mg 50 mL, 1,000 mg, Intravenous, Q8H, EVANGELINA Cadet, Last Rate: 100 mL/hr at 01/04/21 1242, 1,000 mg at 01/04/21 1242    docusate sodium (COLACE) capsule 100 mg, 100 mg, Oral, BID, Luba Driscoll MD, 100 mg at 01/04/21 0858    fluticasone (FLONASE) 50 mcg/act nasal spray 1 spray, 1 spray, Each Nare, Daily, Pegge Wilfrid, DPM, 1 spray at 12/28/20 7840    gabapentin (NEURONTIN) capsule 600 mg, 600 mg, Oral, Daily, Jocy Ferreirat, DPM, 600 mg at 01/04/21 0858    heparin (porcine) subcutaneous injection 5,000 Units, 5,000 Units, Subcutaneous, Q12H [address], 5,000 Units at 01/04/21 0854 **AND** [COMPLETED] Platelet count, , , Once, Sandor Saloni, DPM    insulin lispro (HumaLOG) 100 units/mL subcutaneous injection 1-5 Units, 1-5 Units, Subcutaneous, 4x Daily (AC & HS), 1 Units at 01/03/21 1114 **AND** Fingerstick Glucose (POCT), , , 4x Daily AC and at bedtime, Jocy Ponce, DPM    isosorbide mononitrate (IMDUR) 24 hr tablet 90 mg, 90 mg, Oral, Daily, Vivi Sanders, DPM, 90 mg at 01/04/21 0854    latanoprost (XALATAN) 0 005 % ophthalmic solution 1 drop, 1 drop, Both Eyes, HS, Vivi Sanders, DPM, 1 drop at 01/03/21 2148    loperamide (IMODIUM) capsule 2 mg, 2 mg, Oral, TID PRN, Jocy Ponce, DPM, 2 mg at 12/28/20 0505    melatonin tablet 3 mg, 3 mg, Oral, HS PRN, Jocy Ponce, DPM, 3 mg at 01/03/21 0116    metoprolol succinate (TOPROL-XL) 24 hr tablet 100 mg, 100 mg, Oral, Daily, Jocy Ponce, DPM, 100 mg at 01/04/21 0854    ondansetron (ZOFRAN) injection 4 mg, 4 mg, Intravenous, Q6H PRN, Jocy Ferreirat, DPM    oxyCODONE (ROXICODONE) IR tablet 5 mg, 5 mg, Oral, Q6H PRN, Jocy Ponce, DPM, 5 mg at 01/04/21 0442    pantoprazole (PROTONIX) EC tablet 40 mg, 40 mg, Oral, Early Morning, Vivi Sanders DPM, 40 mg at 01/04/21 0504    senna (SENOKOT) tablet 8 6 mg, 1 tablet, Oral, HS, Tracy Ritter MD, 8 6 mg at 01/03/21 2147    sertraline (ZOLOFT) tablet 50 mg, 50 mg, Oral, Daily, Jocy Ponce DPM, 50 mg at 01/04/21 0858    tamsulosin (FLOMAX) capsule 0 4 mg, 0 4 mg, Oral, HS, Vivi Sanders DPM, 0 4 mg at 01/03/21 2147    traMADol (ULTRAM) tablet 50 mg, 50 mg, Oral, Q12H PRN, KRISTOFER PatelM, 50 mg at 01/04/21 1125    Past Medical History:     Past Medical History:   Diagnosis Date    Anemia     Last assessed: 9/28/17   Marcelo Jung Arteriosclerotic cardiovascular disease     Last assessed: 9/28/17    Bladder cancer ([facility] )     bladder    CAD (coronary artery disease)     Chronic kidney disease     CKD (chronic kidney disease) stage 4, GFR 15-29 ml/min ([facility])     Colon polyp     Diabetes mellitus [facility] )     Foot ulcer ([facility] ) 09/2020    left foot- treated with antibiotics, followed by podiatrist    GERD (gastroesophageal reflux disease)     Hypertension     Hypotension     Last assessed: 2/24/15    Insomnia     Last assessed: 11/14/12    Loss of hearing     has hearing aids but usually does not wear them    Other seasonal allergic rhinitis     Last assessed: 2/10/16    Shortness of breath     Transient cerebral ischemia     Wears glasses         Past Surgical History:     Past Surgical History:   Procedure Laterality Date    CARDIAC SURGERY      Cath stent placement  Last assessed: 3/9/17  Interventional Catheterization    CHOLECYSTECTOMY      COLONOSCOPY      CYSTOSCOPY      Diagnostic w/biopsy  Ledell Males  Last assessed: 12/1/14    CYSTOURETHROSCOPY      w/cautery  Denice Males    GASTRIC BYPASS      For morbid obesity w/Shaji-en-Y  Resolved: 11/17/09    INCISION AND DRAINAGE OF WOUND Right 2/26/2017    Procedure: INCISION AND DRAINAGE (I&D) EXTREMITY WITH APPLICATION OF GRAFT JACKET;  Surgeon: Eusebia Benedict DPM;  [facility];  Service:     INCISION AND DRAINAGE OF WOUND Right 4/25/2017    Procedure: INCISION AND DRAINAGE (I&D) EXTREMITY, APPLICATION OF GRAFT;  Surgeon: Eusebia Benedict DPM;  [facility];  Service:     IR BIOPSY OTHER  7/2/2020    IR TUNNELED CENTRAL LINE PLACEMENT  12/24/2020    JOINT REPLACEMENT      Knee   Last assessed: 1/28/11    IN AMPUTATION METATARSAL+TOE,SINGLE Left 12/21/2020    Procedure: RAY RESECTION FOOT;  Surgeon: Dickson Rojas DPM;  [facility];  Service: Podiatry    IN AMPUTATION METATARSAL+TOE,SINGLE Left 12/31/2020    Procedure: 5TH MET RESECTION;  Surgeon: Garrett Ram DPM;  [facility];  Service: Podiatry    IA CYSTOURETHROSCOPY,BIOPSY N/A 8/16/2016    Procedure: CYSTOSCOPY WITH BIOPSIES;  Surgeon: Amalia Deng MD;  [facility];  Service: Urology    IA CYSTOURETHROSCOPY,FULGUR <0 5 CM LESN N/A 11/19/2020    Procedure: CYSTO W/BIOPSIES, transurethral prostate bx;  Surgeon: Dennis Do MD;  [facility];  Service: Urology    ROTATOR CUFF REPAIR      SMALL INTESTINE SURGERY      Surgery Shaji-en-Y    SPINAL FUSION      lumbar and cervical fusions    VAC DRESSING APPLICATION Right 9/35/0438    Procedure: APPLICATION VAC DRESSING;  Surgeon: Evan Contreras DPM;  [facility];  Service:          Allergies: Allergies   Allergen Reactions    Atorvastatin Hives, Itching and Rash    Simvastatin Rash and Edema     "ALL STATINS"    Statins Itching    Insulin Lispro Swelling and Edema    Medical Tape      rash to electrodes    Other Itching, Rash and Other (See Comments)     rash to electrodes and adhesives           Physical Exam:  /65 (BP Location: Left arm)   Pulse 65   Temp 97 8 °F (36 6 °C) (Temporal)   Resp 16   Wt 104 kg (228 lb 6 3 oz)   SpO2 98%   BMI 27 80 kg/m²        Intake/Output Summary (Last 24 hours) at 1/4/2021 1726  Last data filed at 1/4/2021 0734  Gross per 24 hour   Intake --   Output 1575 ml   Net -1575 ml       Body mass index is 27 8 kg/m²  Gen: No acute distress, Well-nourished, well-appearing  HEENT: Moist mucus membranes, Normocephalic/Atraumatic  Cardiovascular: Regular rate, rhythm, S1/S2  Heme/Extr: Minimal LE edema  Pulmonary: Non-labored breathing  Lungs CTAB  : No wagner  GI: Soft, non-tender, non-distended  BS+  MSK: ROM is WFL in all extremities  No effusions or deformities  Bulk is symmetric  See below for MMT scores  Integumentary: Skin is warm, dry  Foot is rather dry  Neuro: AAOx3, Speech fluent and appropriate  Good insight   Good judgment  MMT:   Strength:   Right  Left  Site  Right  Left  Site    5 5  S Ab: Shoulder Abductors  5  5  HF: Hip Flexors    5 5  EF: Elbow Flexors  5  5 KF: Knee Flexors    5  5  EE: Elbow Extensors  5  5  KE: Knee Extensors    5  5  WE: Wrist Extensors  5  5  DR: Dorsi Flexors    5  5  FF: Finger Flexors  5  5  PF: Plantar Flexors    5  5  HI: Hand Intrinsics  5  NT  EHL: Extensor Hallucis Longus   Psych: Normal mood and affect  LABORATORY RESULTS:      Lab Results   Component Value Date    HGB 9 5 (L) 01/02/2021    HGB 14 0 09/03/2015    HCT 29 3 (L) 01/02/2021    HCT 39 2 09/03/2015    WBC 8 60 01/02/2021    WBC 7 07 09/03/2015     Lab Results   Component Value Date    BUN 40 (H) 01/02/2021    BUN 19 09/03/2015     09/03/2015    K 4 5 01/02/2021    K 4 5 09/03/2015     01/02/2021     09/03/2015    GLUCOSE 203 (H) 09/03/2015    CREATININE 2 28 (H) 01/02/2021    CREATININE 1 37 (H) 09/03/2015     Lab Results   Component Value Date    PROTIME 13 1 02/03/2019    PROTIME 12 5 09/03/2015    INR 0 98 02/03/2019    INR 0 93 09/03/2015        DIAGNOSTIC STUDIES: Reviewed  Xr Foot Left 3+ Views    Result Date: 12/22/2020  Impression: Expected postoperative change status post resection of 5th digit  Workstation performed: [id number]     Mri Foot/forefoot Toes Left Wo Contrast    Result Date: 12/20/2020  Impression: Interval enlargement of plantar lateral ulcer at the level of the fifth metatarsal head  New 1 cm minimally complex fluid collection immediately dorsal lateral to the fifth metatarsal head attributed to small abscess  Osteomyelitis fifth metatarsal head The region of abnormal marrow considered definite osteomyelitis (confluent, medullary T1 marrow space hypointensity,) extends 1 cm cm proximal from the tip of the fifth metatarsal head  (Series 3, image 6 ) Hazy T1 hypointensity signal extends approximately 3 cm proximal to the distal cortex series 3 image 6   Less specific marrow abnormalities (hyperintensity on T2 fat sat/STIR) extend for 2 3 cm  (Series 4, image 6 ) Osteomyelitis medial plantar fifth proximal phalangeal base  The region of abnormal marrow considered definite osteomyelitis (confluent, medullary T1 marrow space hypointensity,) extends 0 4 cm distal to the proximal cortex   (Series 3, image 6 ) Less specific marrow abnormalities (hyperintensity on T2 fat sat/STIR) extend for 0 4 cm  (Series 4, image 6 ) REFERENCE: Society of Skeletal Radiology, White Paper on Nomenclature for Musculoskeletal Infection on MRI (https://skeletalrad org/sites/default/files/2020 Annual Meeting/Nomenclature%20for%20Musculoskeletal%20Infection%20on%20MRI pdf) This study demonstrates a significant  finding and was documented as such in [facility] for liaison and referring practitioner notification   Workstation performed: [id number]

## 2021-01-04 NOTE — PROGRESS NOTES
Progress Note - Podiatry  Yoseph Kline 66 y o  male MRN: [id number]  Unit/Bed#: E5 -01 Encounter: [id number]    Assessment:  1  Left foot ulcer with underlying osteomyelitis of 5th metatarsal - Gonzalez 3   - s/p partial 5th metatarsal resection due to positive bone margins (DOS 12/31/2020)  - s/p partial 5th ray resection (DOS 12/21/2020)  2  Left foot cellulitis  3  MSSA bacteremia, POA  4  Insulin Dependent Diabetes mellitus  5  Coronary arterial disease  6  Peripheral arterial disease  7  Hypertension  8  Chronic Kidney Disease stage 4    Plan:   Continue IV ancef to end today per ID (last dose 2045)   Dressings to remain clean, dry and intact   Medical management per SLIM    Weight bearing status: Nonweightbearing left lower extremity  Disposition: pending rehab placement  VTE Pharmacologic Prophylaxis: heparin subq    Subjective/Objective   Chief Complaint: No chief complaint on file  Subjective: 66 y o  male was seen and evaluated at bedside  Denies nausea, vomiting, fever, chills  Blood pressure 128/66, pulse 76, temperature 98 1 °F (36 7 °C), temperature source Temporal, resp  rate 18, weight 104 kg (228 lb 6 3 oz), SpO2 98 %  Body mass index is 27 8 kg/m²  Physical Exam:   General: Alert, cooperative and no distress  Lower Extremities: Neurovascular status at baseline bilaterally, musculoskeletal function within normal limits bilaterally, no calf tenderness bilaterally, left foot dressings are clean, dry and intact          Lab, Imaging and other studies:   Results from last 7 days   Lab Units 01/02/21  0602   WBC Thousand/uL 8 60   HEMOGLOBIN g/dL 9 5*   HEMATOCRIT % 29 3*   PLATELETS Thousands/uL 223   NEUTROS PCT % 68   LYMPHS PCT % 21   MONOS PCT % 7   EOS PCT % 2     Results from last 7 days   Lab Units 01/02/21  0602   POTASSIUM mmol/L 4 5   CHLORIDE mmol/L 104   CO2 mmol/L 25   BUN mg/dL 40*   CREATININE mg/dL 2 28*   CALCIUM mg/dL 9 1                     Imaging: I have personally reviewed pertinent films and/or reports in PACS  EKG, Pathology, and Other Studies: I have personally reviewed pertinent reports  Portions of the record may have been created with voice recognition software  Occasional wrong word or "sound a like" substitutions may have occurred due to the inherent limitations of voice recognition software  Read the chart carefully and recognize, using context, where substitutions have occurred      Alex Pitts DPM

## 2021-01-04 NOTE — PROGRESS NOTES
Progress Note - Infectious Disease   Christin Marrow 66 y o  male MRN: [id number]  Unit/Bed#: E5 -01 Encounter: [id number]    Impression/Plan:  1  MSSA bacteremia  With only 1 of 2 sets positive   While this could represent a contaminant, the patient had the same organism isolated in the foot wound which had been worsening  Therefore I suspect this is more of a transient bacteremia   TTE was negative for valvular vegetation   Fortunately the patient is not systemically ill and has remained hemodynamically stable   His repeat blood cultures were negative after five days   Today he is afebrile   His WBC count has been normal   He will complete antibiotic treatment today   -antibiotic as below  -monitor CBC and BMP  -monitor vitals     2  Left foot osteomyelitis   Primarily involving the 5th metatarsal but also the phalanx   Infection appeared to be polymicrobial although I suspect that MSSA is the primary invasive organism  Patient is now status post 5th ray amputation   Unfortunately his bone margin remained positive for osteomyelitis  He returned to the OR additional resection and has presumed surgical cure  Patient has been receiving IV cefazolin which he has been tolerating without difficulty  He will complete antibiotic treatment today   -complete IV cefazolin today  -monitor CBC and BMP  -monitor vitals  -serial left foot exams  -local wound care per Podiatry  -continue close follow-up with Podiatry     3   Chronic kidney disease-advanced   Renal function has been waxing and waning   Possibly a pre renal issue    -monitor creatinine  -dose adjust antibiotics for renal function as needed  -volume management per SLIM  -avoid nephrotoxins     4  Chronic dysuria  With positive urine culture for BCG as expected post treatment for bladder cancer   The patient is now status post biopsy with mild chronic inflammation but AFB smears negative for invasive disease   AFB Culture from the biopsies negative thus far at 4 week   A new repeat urine AFB culture was collected on 2020 and is preliminarily negative   -follow-up AFB cultures  -ongoing follow-up with outpatient Urology and Infectious Disease after discharge    Given there are no ongoing infectious disease concerns the Infectious Disease team will sign off at this time  Please call with new questions or concerns  Above plan was discussed in detail with patient at the bedside  Antibiotics:  Cefazolin 4  Antibiotics 18  Postop #14/#4    Subjective:  Patient reports he is doing well today  He continues to have some postop pain in his left foot but he reports it is tolerable  He tells me he is just waiting on rehab placement  He has no fever, chills, sweats, shakes; no nausea, vomiting, abdominal pain, diarrhea, or dysuria; no cough, shortness of breath, or chest pain  No new symptoms, concerns, or complaints to report  Objective:  Vitals:  Temp:  [96 9 °F (36 1 °C)-98 1 °F (36 7 °C)] 98 1 °F (36 7 °C)  HR:  [62-76] 76  Resp:  [18] 18  BP: (108-148)/(66-90) 128/66  SpO2:  [97 %-98 %] 98 %  Temp (24hrs), Av 5 °F (36 4 °C), Min:96 9 °F (36 1 °C), Max:98 1 °F (36 7 °C)  Current: Temperature: 98 1 °F (36 7 °C)    Physical Exam:   General Appearance:  Alert, interactive, nontoxic, no acute distress  He appears comfortable sitting out of bed in his chair having breakfast    Throat: Oropharynx moist without lesions  Lungs:   Clear to auscultation bilaterally; no wheezes, rhonchi or rales; respirations unlabored; patient is on room air   Heart:  RRR; no murmur, rub or gallop   Abdomen:   Soft, non-tender, non-distended, positive bowel sounds  Extremities: No clubbing or cyanosis, no edema; left foot bulky dressing with overlying Ace remains intact, no breakthrough drainage   Skin: No new rashes or lesions noted on exposed skin       Labs, Imaging, & Other studies:   All pertinent labs and imaging studies were personally reviewed  Results from last 7 days Lab Units 01/02/21  0602 12/30/20  0503   WBC Thousand/uL 8 60 8 40   HEMOGLOBIN g/dL 9 5* 9 7*   PLATELETS Thousands/uL 223 228     Results from last 7 days   Lab Units 01/02/21  0602   POTASSIUM mmol/L 4 5   CHLORIDE mmol/L 104   CO2 mmol/L 25   BUN mg/dL 40*   CREATININE mg/dL 2 28*   EGFR ml/min/1 73sq m 31   CALCIUM mg/dL 9 1

## 2021-01-05 LAB
GLUCOSE SERPL-MCNC: 103 MG/DL (ref 65–140)
GLUCOSE SERPL-MCNC: 121 MG/DL (ref 65–140)
GLUCOSE SERPL-MCNC: 122 MG/DL (ref 65–140)
GLUCOSE SERPL-MCNC: 157 MG/DL (ref 65–140)
MYCOBACTERIUM SPEC CULT: NORMAL
RHODAMINE-AURAMINE STN SPEC: NORMAL

## 2021-01-05 PROCEDURE — 82948 REAGENT STRIP/BLOOD GLUCOSE: CPT

## 2021-01-05 PROCEDURE — 99232 SBSQ HOSP IP/OBS MODERATE 35: CPT | Performed by: INTERNAL MEDICINE

## 2021-01-05 RX ADMIN — OXYCODONE HYDROCHLORIDE 5 MG: 5 TABLET ORAL at 02:57

## 2021-01-05 RX ADMIN — DOCUSATE SODIUM 100 MG: 100 CAPSULE, LIQUID FILLED ORAL at 17:26

## 2021-01-05 RX ADMIN — HEPARIN SODIUM 5000 UNITS: 5000 INJECTION INTRAVENOUS; SUBCUTANEOUS at 23:44

## 2021-01-05 RX ADMIN — SENNOSIDES 8.6 MG: 8.6 TABLET ORAL at 23:43

## 2021-01-05 RX ADMIN — ISOSORBIDE MONONITRATE 90 MG: 30 TABLET, EXTENDED RELEASE ORAL at 09:21

## 2021-01-05 RX ADMIN — FLUTICASONE PROPIONATE 1 SPRAY: 50 SPRAY, METERED NASAL at 09:21

## 2021-01-05 RX ADMIN — TRAMADOL HYDROCHLORIDE 50 MG: 50 TABLET, FILM COATED ORAL at 23:43

## 2021-01-05 RX ADMIN — GABAPENTIN 600 MG: 300 CAPSULE ORAL at 09:21

## 2021-01-05 RX ADMIN — LATANOPROST 1 DROP: 50 SOLUTION OPHTHALMIC at 23:44

## 2021-01-05 RX ADMIN — TAMSULOSIN HYDROCHLORIDE 0.4 MG: 0.4 CAPSULE ORAL at 23:44

## 2021-01-05 RX ADMIN — OXYCODONE HYDROCHLORIDE 5 MG: 5 TABLET ORAL at 17:28

## 2021-01-05 RX ADMIN — ALPRAZOLAM 0.25 MG: 0.25 TABLET ORAL at 23:43

## 2021-01-05 RX ADMIN — HEPARIN SODIUM 5000 UNITS: 5000 INJECTION INTRAVENOUS; SUBCUTANEOUS at 09:21

## 2021-01-05 RX ADMIN — DOCUSATE SODIUM 100 MG: 100 CAPSULE, LIQUID FILLED ORAL at 09:21

## 2021-01-05 RX ADMIN — PANTOPRAZOLE SODIUM 40 MG: 40 TABLET, DELAYED RELEASE ORAL at 06:18

## 2021-01-05 RX ADMIN — ASPIRIN 81 MG CHEWABLE TABLET 81 MG: 81 TABLET CHEWABLE at 09:21

## 2021-01-05 RX ADMIN — METOPROLOL SUCCINATE 100 MG: 50 TABLET, EXTENDED RELEASE ORAL at 09:21

## 2021-01-05 RX ADMIN — ALPRAZOLAM 0.25 MG: 0.25 TABLET ORAL at 04:41

## 2021-01-05 RX ADMIN — SERTRALINE HYDROCHLORIDE 50 MG: 50 TABLET ORAL at 09:21

## 2021-01-05 RX ADMIN — INSULIN LISPRO 1 UNITS: 100 INJECTION, SOLUTION INTRAVENOUS; SUBCUTANEOUS at 16:47

## 2021-01-05 NOTE — ASSESSMENT & PLAN NOTE
· Management per primary service    - s/p RAY RESECTION FOOT (Left) by Podiatry 12/21/2020  - status post partial left 5th metatarsal resection due to + margins 12/31/2020    Will be discharged with 2 weeks of intravenous antibiotics  Has PICC line placed by Interventional Radiology on 12/24 in place

## 2021-01-05 NOTE — PHYSICIAN ADVISOR
Current patient class: Inpatient  The patient is currently on Hospital Day: 23      The patient was admitted to the hospital at [phone] on 12/18/20 for the following diagnosis:  Osteomyelitis of foot, unspecified laterality, unspecified type ([address] ) [M86 [id number]]     CMS OUTLIER STAY REVIEW    After review of the relevant documentation, labs, vital signs and test results, the patient is appropriate for CONTINUED INPATIENT ADMISSION  The patient continues to remain hospitalized receiving acute medical care  The patient has surpassed the expected duration of stay, however given the clinical condition, need for further acute care management, the patient is appropriate to remain in an inpatient status  The patient still being actively managed, and does have unresolved medical issues requiring further hospitalization  This review is conducted at 10 day intervals, to help satisfy the requirements for significant outlier stay review as per CMS  Given the current condition of this patient, the patient satisfies this review was determination for continued inpatient stay  Rationale is as follows: The patient is a 66 yrs old Male who presented 1 2/1/20 for osteomyelitis of left 5th metatarsal  He is s/p RAY RESECTION FOOT (Left) by Podiatry 12/21/2020 and status post partial left 5th metatarsal resection due to + margins 12/31/2020  He also was found to have MSSA bacteremia secondary to OM  He was seen by ID and echo did not reveal endocarditis  He was treated with IV ancef for 2 week course which completed 1/4/21    He was see by PMR and discharge planning for short term rehab is pending      The patients vitals on arrival were   ED Triage Vitals   Temperature Pulse Respirations Blood Pressure SpO2   12/18/20 1152 12/18/20 1152 12/18/20 1535 12/18/20 1152 12/18/20 1152   (!) 97 4 °F (36 3 °C) 65 18 112/72 100 %      Temp Source Heart Rate Source Patient Position - Orthostatic VS BP Location FiO2 (%)   12/18/20 1152 -- 12/18/20 1152 12/18/20 1152 --   Temporal  Sitting Left arm       Pain Score       12/18/20 1152       6           Past Medical History:   Diagnosis Date    Anemia     Last assessed: 9/28/17    Arteriosclerotic cardiovascular disease     Last assessed: 9/28/17    Bladder cancer ([facility] )     bladder    CAD (coronary artery disease)     Chronic kidney disease     CKD (chronic kidney disease) stage 4, GFR 15-29 ml/min ([facility])     Colon polyp     Diabetes mellitus ([facility] )     Foot ulcer ([facility] ) 09/2020    left foot- treated with antibiotics, followed by podiatrist    GERD (gastroesophageal reflux disease)     Hypertension     Hypotension     Last assessed: 2/24/15    Insomnia     Last assessed: 11/14/12    Loss of hearing     has hearing aids but usually does not wear them    Other seasonal allergic rhinitis     Last assessed: 2/10/16    Shortness of breath     Transient cerebral ischemia     Wears glasses      Past Surgical History:   Procedure Laterality Date    CARDIAC SURGERY      Cath stent placement  Last assessed: 3/9/17  Interventional Catheterization    CHOLECYSTECTOMY      COLONOSCOPY      CYSTOSCOPY      Diagnostic w/biopsy  Shahbaz Barragan  Last assessed: 12/1/14    CYSTOURETHROSCOPY      w/cautery  Shahbaz Barragan    GASTRIC BYPASS      For morbid obesity w/Shaji-en-Y  Resolved: 11/17/09    INCISION AND DRAINAGE OF WOUND Right 2/26/2017    Procedure: INCISION AND DRAINAGE (I&D) EXTREMITY WITH APPLICATION OF GRAFT JACKET;  Surgeon: United Jeannette DPM;  [facility];  Service:     INCISION AND DRAINAGE OF WOUND Right 4/25/2017    Procedure: INCISION AND DRAINAGE (I&D) EXTREMITY, APPLICATION OF GRAFT;  Surgeon: United Jeannette DPM;  [facility];  Service:     IR BIOPSY OTHER  7/2/2020    IR TUNNELED CENTRAL LINE PLACEMENT  12/24/2020    JOINT REPLACEMENT      Knee   Last assessed: 1/28/11    NM AMPUTATION METATARSAL+TOE,SINGLE Left 12/21/2020    Procedure: RAY RESECTION FOOT;  Surgeon: Adrian Fu DPM;  [facility];  Service: Podiatry    AK AMPUTATION METATARSAL+TOE,SINGLE Left 12/31/2020    Procedure: 5TH MET RESECTION;  Surgeon: Adrian Fu DPM;  [facility];  Service: Podiatry    AK CYSTOURETHROSCOPY,BIOPSY N/A 8/16/2016    Procedure: Frank Zachary;  Surgeon: Beth Dey MD;  [facility];  Service: Urology    AK CYSTOURETHROSCOPY,FULGUR <0 5 CM LESN N/A 11/19/2020    Procedure: CYSTO W/BIOPSIES, transurethral prostate bx;  Surgeon: Yudith Lazaro MD;  [facility];  Service: Urology    ROTATOR CUFF REPAIR      SMALL INTESTINE SURGERY      Surgery Shaji-en-Y    SPINAL FUSION      lumbar and cervical fusions    VAC DRESSING APPLICATION Right 2/18/9521    Procedure: APPLICATION VAC DRESSING;  Surgeon: Daniel Hunter DPM;  [facility];  Service:            Consults have been placed to:   IP CONSULT TO INTERNAL MEDICINE  IP CONSULT TO VASCULAR SURGERY  IP CONSULT TO PHARMACY  IP CONSULT TO CARDIOLOGY  IP CONSULT TO INFECTIOUS DISEASES  IP CONSULT TO CASE MANAGEMENT  INPATIENT CONSULT TO IR  IP CONSULT TO CASE MANAGEMENT  IP CONSULT TO PHYSICAL MEDICINE REHAB    Vitals:    01/04/21 2312 01/05/21 0554 01/05/21 0924 01/05/21 1646   BP: 168/92  149/96 139/86   BP Location: Left arm  Left arm Right arm   Pulse: 60  60 60   Resp: 18  16 18   Temp: 98 2 °F (36 8 °C)  97 6 °F (36 4 °C) (!) 97 °F (36 1 °C)   TempSrc: Temporal  Temporal Temporal   SpO2: 100%  95% 98%   Weight:  105 kg (230 lb 9 6 oz)         Most recent labs:    No results for input(s): WBC, HGB, HCT, PLT, K, NA, CALCIUM, BUN, CREATININE, LIPASE, AMYLASE, INR, TROPONINI, CKTOTAL, AST, ALT, ALKPHOS, BILITOT in the last 72 hours      Scheduled Meds:  Current Facility-Administered Medications   Medication Dose Route Frequency Provider Last Rate    ALPRAZolam  0 25 mg Oral HS PRN Anthony Espinoza DPM      aspirin  81 mg Oral Daily Vivi Yong Cosme, DPM      docusate sodium  100 mg Oral BID Kentrell Qureshi MD      fluticasone  1 spray Each Nare Daily [place], [place]      gabapentin  600 mg Oral Daily [facility], DPM      heparin (porcine)  5,000 Units Subcutaneous Q12H [facility], [place]      insulin lispro  1-5 Units Subcutaneous 4x Daily (AC & HS) Donalynn Span, DPM      isosorbide mononitrate  90 mg Oral Daily [facility], DPM      latanoprost  1 drop Both Eyes HS Donalynn [facility], DPM      loperamide  2 mg Oral TID PRN Donalynn Span, DPM      melatonin  3 mg Oral HS PRN Donalynn Span, DPM      metoprolol succinate  100 mg Oral Daily Donalynn [facility], DPM      ondansetron  4 mg Intravenous Q6H PRN Donalynn [facility], DPM      oxyCODONE  5 mg Oral Q6H PRN Donalynn Span, DPM      pantoprazole  40 mg Oral Early Morning [facility], D[facility]      senna  1 tablet Oral HS Kentrell Qureshi MD      sertraline  50 mg Oral Daily Donalynn [facility], DPM      tamsulosin  0 4 mg Oral HS Donalynn [facility], DPM      traMADol  50 mg Oral Q12H PRN Donalynn [facility], DPM       Continuous Infusions:   PRN Meds:   ALPRAZolam    loperamide    melatonin    ondansetron    oxyCODONE    traMADol    Surgical procedures (if appropriate):  Procedure(s):  5TH MET RESECTION

## 2021-01-05 NOTE — SOCIAL WORK
JASON met with pt at bedside to discuss discharge planning and STR placement  SW notified pt that he is medically stable for discharge and [facility] is willing to accept today and has a bed for him  Pt requested that JASON send a referral to [place] [id number] as it is closes to his home  SW did emphasize to the pt that the hospital is at capacity due to covid and we are trying to do the best we can to accommodate pt's wishes and working towards getting pt's that are medically stable out to their home's or rehab  Pt reports that he understands but will still like for a referral to be made to [place] [id number]  A referral was place to [place] [id number]  SW will continue to follow for STR placement and transportation

## 2021-01-05 NOTE — PROGRESS NOTES
Progress Note - [facility] 1945, 66 y o  male MRN: [id number]    Unit/Bed#: E5 -01 Encounter: [id number]    Primary Care Provider: Mike Rosales MD   Date and time admitted to hospital: 12/18/2020 11:52 AM        MSSA bacteremia  Assessment & Plan  · Related to foot infection  · WIthout endocarditis with rapid clearance  · Management per ID    -  IV cefepime until 1/4/21 per ID (14 days total)    Anemia of chronic disease  Assessment & Plan  Stable  Transfuse as needed    Acute kidney injury superimposed on chronic kidney disease ([facility] )  Assessment & Plan  GARY on CKD stage IV  GARY has resolved  Creatinine is stable and at baseline  Clinically no sign of pedal edema  He uses lasix prn and this is not needed at this time    Type 2 diabetes mellitus, with long-term current use of insulin [facility])  Assessment & Plan  Lab Results   Component Value Date    HGBA1C 7 4 (H) 12/18/2020     Recent Labs     01/03/21  2116 01/04/21  0723 01/04/21  1057 01/04/21  1604   POCGLU 111 112 140 147*   A1c at goal  Blood sugar stable  Perioperative blood sugar goal less than 180  Currently off Lantus and just on sliding scale insulin, and blood sugars all under 150    Hypertension with renal disease  Assessment & Plan  Continue metoprolol succinate 100 mg daily and imdur 90 mg daily      CAD (coronary artery disease)  Assessment & Plan  Stable     Tolerated his procedure without any cardiac complication  Continue  aspirin 81 mg daily, metoprolol succinate 100 mg daily, and Imdur 90 mg daily    * Subacute osteomyelitis of left foot ([facility] )  Assessment & Plan  · Management per primary service    - s/p RAY RESECTION FOOT (Left) by Podiatry 12/21/2020  - status post partial left 5th metatarsal resection due to + margins 12/31/2020    Will be discharged with 2 weeks of intravenous antibiotics  Has PICC line placed by Interventional Radiology on 12/24 in place      [facility] Internal Medicine Progress Note  Patient: Alyssia Martinez Christine Baker 66 y o  male   MRN: [id number]  PCP: Tu Peacock MD  Unit/Bed#: E5 -23 Encounter: [id number]  Date Of Visit: 21    Assessment:    Principal Problem:    Subacute osteomyelitis of left foot ([address] )  Active Problems:    CAD (coronary artery disease)    Hypertension with renal disease    Type 2 diabetes mellitus, with long-term current use of insulin (HCC)    Acute kidney injury superimposed on chronic kidney disease (HCC)    Anemia of chronic disease    MSSA bacteremia      Plan:    · Doing well  · No objection to discharge once facility can be found       VTE Pharmacologic Prophylaxis:   Pharmacologic: Heparin  Mechanical VTE Prophylaxis in Place: Yes    Patient Centered Rounds: I have performed bedside rounds with nursing staff today  Discussions with Specialists or Other Care Team Provider:  Discussed with case management    Education and Discussions with Family / Patient:  Primary service  The family    Time Spent for Care: 30 minutes  More than 50% of total time spent on counseling and coordination of care as described above  Current Length of Stay: 17 day(s)    Current Patient Status: Inpatient   Certification Statement: The patient will continue to require additional inpatient hospital stay due to Placement    Discharge Plan / Estimated Discharge Date:  24 hour    Code Status: Level 1 - Full Code      Subjective:   Patient seen examined, no acute complaints  No chest pain shortness of breath or difficulty breathing  Otherwise tolerated oral diet    A complete and comprehensive 14 point organ system review has been performed and all other systems are negative other than stated above  Objective:     Vitals:   Temp (24hrs), Av 6 °F (36 4 °C), Min:96 9 °F (36 1 °C), Max:98 1 °F (36 7 °C)    Temp:  [96 9 °F (36 1 °C)-98 1 °F (36 7 °C)] 97 8 °F (36 6 °C)  HR:  [62-76] 65  Resp:  [16-18] 16  BP: (107-148)/(65-90) 107/65  SpO2:  [97 %-98 %] 98 %  Body mass index is 27 8 kg/m²       Input and Output Summary (last 24 hours): Intake/Output Summary (Last 24 hours) at 1/4/2021 1927  Last data filed at 1/4/2021 1410  Gross per 24 hour   Intake --   Output 1575 ml   Net -1575 ml       Physical Exam:     General: well appearing, no acute distress  HEENT: atraumatic, PERRLA, moist mucosa, normal pharynx, normal tonsils and adenoids, normal tongue, no fluid in sinuses  Neck: Trachea midline, no carotid bruit, no masses  Respiratory: normal chest wall expansion, CTA B, no r/r/w, no rubs  Cardiovascular: RRR, no m/r/g, Normal S1 and S2  Abdomen: Soft, non-tender, non-distended, normal bowel sounds in all quadrants, no hepatosplenomegaly, no tympany  Rectal: deferred  Musculoskeletal: normal ROM in upper and lower extremities  Integumentary:  Dressing clear dry and intact  Heme/Lymph: no lymphadenopathy, no bruises  Neurological: Cranial Nerves II-XII grossly intact, no tics, normal sensation to pressure and light touch  Psychiatric: cooperative with normal mood, affect, and cognition      Additional Data:     Labs:    Results from last 7 days   Lab Units 01/02/21  0602   WBC Thousand/uL 8 60   HEMOGLOBIN g/dL 9 5*   HEMATOCRIT % 29 3*   PLATELETS Thousands/uL 223   NEUTROS PCT % 68   LYMPHS PCT % 21   MONOS PCT % 7   EOS PCT % 2     Results from last 7 days   Lab Units 01/02/21  0602   POTASSIUM mmol/L 4 5   CHLORIDE mmol/L 104   CO2 mmol/L 25   BUN mg/dL 40*   CREATININE mg/dL 2 28*   CALCIUM mg/dL 9 1           * I Have Reviewed All Lab Data Listed Above  * Additional Pertinent Lab Tests Reviewed:  [address] [facility] Admission Reviewed    Imaging:    Imaging Reports Reviewed Today Include:  No new imaging  Imaging Personally Reviewed by Myself Includes:  No new imaging   Recent Cultures (last 7 days):           Last 24 Hours Medication List:   Current Facility-Administered Medications   Medication Dose Route Frequency Provider Last Rate    ALPRAZolam  0 25 mg Oral HS PRN Aurea Mcrae DPM  aspirin  81 mg Oral Daily Tilmon Alexis, DPM      cefazolin  1,000 mg Intravenous Aarti [place], CRNP 1,000 mg (01/04/21 1242)    docusate sodium  100 mg Oral BID Aida Liu MD      fluticasone  1 spray Each Nare Daily Tilmon Alexis, DPM      gabapentin  600 mg Oral Daily Tilmon [place], DPM      heparin (porcine)  5,000 Units Subcutaneous Q12H [address] Tilmon [place], DPM      insulin lispro  1-5 Units Subcutaneous 4x Daily (AC & HS) Tilmon Alexis, DPM      isosorbide mononitrate  90 mg Oral Daily Tilmon [place], DPM      latanoprost  1 drop Both Eyes HS Tilmon [place], DPM      loperamide  2 mg Oral TID PRN Tilmon [place], DPM      melatonin  3 mg Oral HS PRN Tilmon Alexis, DPM      metoprolol succinate  100 mg Oral Daily Tilmon [place], DPM      ondansetron  4 mg Intravenous Q6H PRN Tilmon Alexis, DPM      oxyCODONE  5 mg Oral Q6H PRN Tilmon Alexis, DPM      pantoprazole  40 mg Oral Early Morning Tilmon Alexis, DPM      senna  1 tablet Oral HS Aida Liu MD      sertraline  50 mg Oral Daily Tilmon Alexis, DPM      tamsulosin  0 4 mg Oral HS Tilmon [place], DPM      traMADol  50 mg Oral Q12H PRN Tilmon Alexis, DPM          Today, Patient Was Seen By: Elsie Coello DO    ** Please Note: This note has been constructed using a voice recognition system   **

## 2021-01-05 NOTE — PROGRESS NOTES
Progress Note - Podiatry  Delvis Saxena 66 y o  male MRN: [id number]  Unit/Bed#: E5 -01 Encounter: [id number]    Assessment:  1  Left foot ulcer with underlying osteomyelitis of 5th metatarsal - [place] 3   - s/p partial 5th metatarsal resection due to positive bone margins (DOS 12/31/2020)  - s/p partial 5th ray resection (DOS 12/21/2020)  2  Left foot cellulitis - resolved  3  MSSA bacteremia, POA - resolved  4  Insulin Dependent Diabetes mellitus  5  Coronary arterial disease  6  Peripheral arterial disease  7  Hypertension  8  Chronic Kidney Disease stage 4    Plan:   Anticipate discharge today pending bed availability at [facility]   Patient has finished 14 day course of antibiotics for bacteremia and surgical cure of osteomyelitis of left foot   Patient is stable for discharge today    Weight bearing status: nonweightbearing left lower extremity  Disposition: pending placement availability   VTE Pharmacologic Prophylaxis: heparin subq    Subjective/Objective   Chief Complaint: No chief complaint on file  Subjective: 66 y o  male was seen and evaluated at bedside  Feeling well, denies pain to left foot  Blood pressure 168/92, pulse 60, temperature 98 2 °F (36 8 °C), temperature source Temporal, resp  rate 18, weight 105 kg (230 lb 9 6 oz), SpO2 100 %  Body mass index is 28 07 kg/m²  Physical Exam:   General: Alert, cooperative and no distress  Lower Extremities: Neurovascular status at baseline bilaterally, musculoskeletal function within normal limits bilaterally, no calf tenderness bilaterally, left foot incision site is approximated, mild maceration centrally, eschar present distal incision, overall doing well with no signs of dehiscence, no cellulitis present          Lab, Imaging and other studies:   Results from last 7 days   Lab Units 01/02/21  0602   WBC Thousand/uL 8 60   HEMOGLOBIN g/dL 9 5*   HEMATOCRIT % 29 3*   PLATELETS Thousands/uL 223   NEUTROS PCT % 68   LYMPHS PCT % 21   MONOS PCT % 7   EOS PCT % 2     Results from last 7 days   Lab Units 01/02/21  0602   POTASSIUM mmol/L 4 5   CHLORIDE mmol/L 104   CO2 mmol/L 25   BUN mg/dL 40*   CREATININE mg/dL 2 28*   CALCIUM mg/dL 9 1                     Imaging: I have personally reviewed pertinent films and/or reports in PACS  EKG, Pathology, and Other Studies: I have personally reviewed pertinent reports  Portions of the record may have been created with voice recognition software  Occasional wrong word or "sound a like" substitutions may have occurred due to the inherent limitations of voice recognition software  Read the chart carefully and recognize, using context, where substitutions have occurred      Jocy Ponce DPM

## 2021-01-05 NOTE — PLAN OF CARE
Problem: Potential for Falls  Goal: Patient will remain free of falls  Description: INTERVENTIONS:  - Assess patient frequently for physical needs  -  Identify cognitive and physical deficits and behaviors that affect risk of falls    -  [place] fall precautions as indicated by assessment   - Educate patient/family on patient safety including physical limitations  - Instruct patient to call for assistance with activity based on assessment  - Modify environment to reduce risk of injury  - Consider OT/PT consult to assist with strengthening/mobility  Outcome: Progressing

## 2021-01-05 NOTE — ASSESSMENT & PLAN NOTE
Lab Results   Component Value Date    HGBA1C 7 4 (H) 12/18/2020     Recent Labs     01/03/21  2116 01/04/21  0723 01/04/21  1057 01/04/21  1604   POCGLU 111 112 140 147*   A1c at goal  Blood sugar stable  Perioperative blood sugar goal less than 180  Currently off Lantus and just on sliding scale insulin, and blood sugars all under 150

## 2021-01-06 ENCOUNTER — TRANSITIONAL CARE MANAGEMENT (OUTPATIENT)
Dept: INTERNAL MEDICINE CLINIC | Facility: CLINIC | Age: 76
End: 2021-01-06

## 2021-01-06 VITALS
BODY MASS INDEX: 27.92 KG/M2 | RESPIRATION RATE: 16 BRPM | HEART RATE: 67 BPM | TEMPERATURE: 98.1 F | WEIGHT: 229.28 LBS | OXYGEN SATURATION: 98 % | HEIGHT: 76 IN | DIASTOLIC BLOOD PRESSURE: 81 MMHG | SYSTOLIC BLOOD PRESSURE: 134 MMHG

## 2021-01-06 PROBLEM — M86.272 SUBACUTE OSTEOMYELITIS OF LEFT FOOT (HCC): Status: RESOLVED | Noted: 2020-12-18 | Resolved: 2021-01-06

## 2021-01-06 PROBLEM — B95.61 MSSA BACTEREMIA: Status: RESOLVED | Noted: 2020-12-21 | Resolved: 2021-01-06

## 2021-01-06 PROBLEM — N17.9 ACUTE KIDNEY INJURY SUPERIMPOSED ON CHRONIC KIDNEY DISEASE (HCC): Status: RESOLVED | Noted: 2020-02-12 | Resolved: 2021-01-06

## 2021-01-06 PROBLEM — R78.81 MSSA BACTEREMIA: Status: RESOLVED | Noted: 2020-12-21 | Resolved: 2021-01-06

## 2021-01-06 PROBLEM — N18.9 ACUTE KIDNEY INJURY SUPERIMPOSED ON CHRONIC KIDNEY DISEASE (HCC): Status: RESOLVED | Noted: 2020-02-12 | Resolved: 2021-01-06

## 2021-01-06 LAB
GLUCOSE SERPL-MCNC: 157 MG/DL (ref 65–140)
GLUCOSE SERPL-MCNC: 98 MG/DL (ref 65–140)

## 2021-01-06 PROCEDURE — 99232 SBSQ HOSP IP/OBS MODERATE 35: CPT | Performed by: INTERNAL MEDICINE

## 2021-01-06 PROCEDURE — 82948 REAGENT STRIP/BLOOD GLUCOSE: CPT

## 2021-01-06 RX ORDER — METOPROLOL SUCCINATE 100 MG/1
100 TABLET, EXTENDED RELEASE ORAL DAILY
Qty: 30 TABLET | Refills: 0
Start: 2021-01-07 | End: 2021-06-11 | Stop reason: SDUPTHER

## 2021-01-06 RX ORDER — GABAPENTIN 300 MG/1
600 CAPSULE ORAL DAILY
Qty: 60 CAPSULE | Refills: 0
Start: 2021-01-07 | End: 2021-01-28 | Stop reason: ALTCHOICE

## 2021-01-06 RX ORDER — TAMSULOSIN HYDROCHLORIDE 0.4 MG/1
0.4 CAPSULE ORAL
Qty: 30 CAPSULE | Refills: 0
Start: 2021-01-06 | End: 2022-02-02 | Stop reason: SDUPTHER

## 2021-01-06 RX ORDER — PANTOPRAZOLE SODIUM 40 MG/1
40 TABLET, DELAYED RELEASE ORAL
Qty: 30 TABLET | Refills: 0
Start: 2021-01-07 | End: 2021-07-23 | Stop reason: SDUPTHER

## 2021-01-06 RX ORDER — DOCUSATE SODIUM 100 MG/1
100 CAPSULE, LIQUID FILLED ORAL 2 TIMES DAILY
Qty: 10 CAPSULE | Refills: 0
Start: 2021-01-06 | End: 2022-02-02 | Stop reason: SDUPTHER

## 2021-01-06 RX ORDER — ISOSORBIDE MONONITRATE 30 MG/1
90 TABLET, EXTENDED RELEASE ORAL DAILY
Qty: 90 TABLET | Refills: 0
Start: 2021-01-07 | End: 2021-10-12

## 2021-01-06 RX ORDER — ASPIRIN 81 MG/1
81 TABLET, CHEWABLE ORAL DAILY
Qty: 30 TABLET | Refills: 0 | Status: ON HOLD
Start: 2021-01-07 | End: 2021-10-20

## 2021-01-06 RX ADMIN — SERTRALINE HYDROCHLORIDE 50 MG: 50 TABLET ORAL at 08:20

## 2021-01-06 RX ADMIN — OXYCODONE HYDROCHLORIDE 5 MG: 5 TABLET ORAL at 14:07

## 2021-01-06 RX ADMIN — GABAPENTIN 600 MG: 300 CAPSULE ORAL at 08:20

## 2021-01-06 RX ADMIN — METOPROLOL SUCCINATE 100 MG: 50 TABLET, EXTENDED RELEASE ORAL at 08:20

## 2021-01-06 RX ADMIN — DOCUSATE SODIUM 100 MG: 100 CAPSULE, LIQUID FILLED ORAL at 08:20

## 2021-01-06 RX ADMIN — HEPARIN SODIUM 5000 UNITS: 5000 INJECTION INTRAVENOUS; SUBCUTANEOUS at 08:20

## 2021-01-06 RX ADMIN — PANTOPRAZOLE SODIUM 40 MG: 40 TABLET, DELAYED RELEASE ORAL at 05:46

## 2021-01-06 RX ADMIN — ASPIRIN 81 MG CHEWABLE TABLET 81 MG: 81 TABLET CHEWABLE at 08:20

## 2021-01-06 RX ADMIN — OXYCODONE HYDROCHLORIDE 5 MG: 5 TABLET ORAL at 05:46

## 2021-01-06 RX ADMIN — ISOSORBIDE MONONITRATE 90 MG: 30 TABLET, EXTENDED RELEASE ORAL at 08:19

## 2021-01-06 NOTE — DISCHARGE SUMMARY
PODIATRY DISCHARGE SUMMARY     Patient Name: Valerie Simpson   Age & Sex: 66 y o  male   MRN: [id number]  Unit/Bed#: E5 -01   Encounter: [id number]  Length of Stay: 19 days    Active Problems:    CAD (coronary artery disease)    Hypertension with renal disease    Type 2 diabetes mellitus, with long-term current use of insulin ([address] )    Anemia of chronic disease      [address]     Mr Judi Miranda is a 66year old male who presented to [facility] on 12/18/2020 with left 5th metatarsal wound with underlying osteomyelitis confirmed by plain film radiograph  Patient was admitted with intent to perform surgical resection of infected bone  MRI was ordered on 12/18/2020 which later revealed a small abscess within the dorsal lateral 5th metatarsal head and adjacent osteomyelitis of the 5th metatarsal head and 5th proximal phalanx  Vascular surgery was consulted to assess lower extremity perfusion to ensure healing levels optimized prior to surgical intervention  Internal Medicine was consulted for medical co- management and for preoperative risk stratification  Cardiology was consulted for preoperative clearance  On admission, patient had leukocytosis WBC 11 15, creatinine 2 47, HbA1c 7 4, ESR 42  Blood cultures were collected  Aerobic and anaerobic Wound cultures were collected at bedside  Patient started on IV cefepime/PO Flagyl  1/2 Blood cultures collected on 12/18/2020 in later revealed growth of MSSA  Anaerobic Wound cultures grew 1+ prevotella  Aerobic wound cultures with 2+ Enterobacter, 4+ MSSA  Therefore, Infectious Diseases was consulted for management of osteomyelitis, cellulitis, bacteremia  Echocardiogram was negative for valvular vegetation  PICC line was inserted on 12/24/2020  Patient completed 2 weeks postop IV antibiotics from initial I&D  Was thereafter transitioned to Ancef  Repeat blood cultures were negative for growth      After obtaining lower extremity arterial duplex studies, vascular surgery determined that it was okay to proceed with surgical intervention without immediate endovascular procedure  Patient was taken to the OR on 12/21/2020 for left 5th metatarsal resection  Proximal Clean margins were sent to pathology to assess residual osteomyelitis  Pathology report from intraoperative proximal margin bone sample revealed acute osteomyelitis present  Therefore, patient was taking back to the OR on 12/31/2020 for revisional 5th metatarsal resection, which was considered cure  Physical therapy and occupational therapy assessed patient postoperatively and recommended acute rehab placement  Patient was deemed stable for discharge to SNF facility on 01/06/2021  DISCHARGE INFORMATION     PCP at Discharge: Sara Lowry MD    Admitting Provider: Karina Hernandez  Admission Date: 12/18/2020     Discharge Provider: Karina Hernandez  Discharge Date: 01/06/21    Discharge Disposition: [facility]  Discharge Condition: Stable  Discharge with Lines: No  Discharge Diet: diabetic  Activity Restrictions: NWB LLE  Test Results Pending at Discharge: none  Medications at Discharge: See after visit summary for reconciled discharge medications provided to patient and family  Discharge Diagnoses: Active Problems:    CAD (coronary artery disease)    Hypertension with renal disease    Type 2 diabetes mellitus, with long-term current use of insulin (HCC)    Anemia of chronic disease      Consulting Providers:  Infectious diseases  Internal medicine  Physical therapy  Occupational Therapy  Cardiology   Vascular surgery    Diagnostic & Therapeutic Procedures Performed:  Xr Foot Left 3+ Views    Result Date: 12/22/2020  Impression: Expected postoperative change status post resection of 5th digit   Workstation performed: [id number]     Mri Foot/forefoot Toes Left Wo Contrast    Result Date: 12/20/2020  Impression: Interval enlargement of plantar lateral ulcer at the level of the fifth metatarsal head  New 1 cm minimally complex fluid collection immediately dorsal lateral to the fifth metatarsal head attributed to small abscess  Osteomyelitis fifth metatarsal head The region of abnormal marrow considered definite osteomyelitis (confluent, medullary T1 marrow space hypointensity,) extends 1 cm cm proximal from the tip of the fifth metatarsal head  (Series 3, image 6 ) Hazy T1 hypointensity signal extends approximately 3 cm proximal to the distal cortex series 3 image 6  Less specific marrow abnormalities (hyperintensity on T2 fat sat/STIR) extend for 2 3 cm  (Series 4, image 6 ) Osteomyelitis medial plantar fifth proximal phalangeal base  The region of abnormal marrow considered definite osteomyelitis (confluent, medullary T1 marrow space hypointensity,) extends 0 4 cm distal to the proximal cortex   (Series 3, image 6 ) Less specific marrow abnormalities (hyperintensity on T2 fat sat/STIR) extend for 0 4 cm  (Series 4, image 6 ) REFERENCE: Society of Skeletal Radiology, White Paper on Nomenclature for Musculoskeletal Infection on MRI (https://skeletalrad org/sites/default/files/2020 Annual Meeting/Nomenclature%20for%20Musculoskeletal%20Infection%20on%20MRI pdf) This study demonstrates a significant  finding and was documented as such in [facility] for liaison and referring practitioner notification  Workstation performed: [id number]       Code Status: Level 1 - Full Code  Advance Directive and Living Will:      Power of :    POLST:      FOLLOW-UP     PCP Outpatient Follow-up:    Consulting Providers Follow-up:    Active Issues Requiring Follow-Up:  Post operative state    Discharge Statement:  I spent 25 minutes discharging the patient  This time was spent on the day of discharge  I had direct contact with the patient on the day of discharge  Additional documentation is required if more than 30 minutes were spent on discharge         Portions of the record may have been created with voice recognition software  Occasional wrong word or "sound a like" substitutions may have occurred due to the inherent limitations of voice recognition software    Read the chart carefully and recognize, using context, where substitutions have occurred   ==  Fela Brown, [facility] Center[facility]rienne Mensah [facility]  Podiatric Medicine & Surgery PGY-2

## 2021-01-06 NOTE — PROGRESS NOTES
Progress Note - Podiatry  Gurdeep Oh 66 y o  male MRN: [id number]  Unit/Bed#: E5 -01 Encounter: [id number]    Assessment:  1  Left foot ulcer with underlying osteomyelitis of 5th metatarsal - Gonzalez 3   - s/p partial 5th metatarsal resection due to positive bone margins (DOS 12/31/2020)  - s/p partial 5th ray resection (DOS 12/21/2020)  2  Left foot cellulitis - resolved  3  MSSA bacteremia, POA - resolved  4  Insulin Dependent Diabetes mellitus  5  Coronary arterial disease  6  Peripheral arterial disease  7  Hypertension  8  Chronic Kidney Disease stage 4    Plan:   Patient is medically stable for discharge pending transport and placement today    Weight bearing status: nonweightbearing left lower extremity  Disposition: pending placement availability   VTE Pharmacologic Prophylaxis: heparin subq    Subjective/Objective   Chief Complaint: No chief complaint on file  Subjective: 66 y o  male was seen and evaluated at bedside  Denies nausea, vomiting, fever, chills  Blood pressure 134/81, pulse 67, temperature 98 1 °F (36 7 °C), temperature source Temporal, resp  rate 16, weight 104 kg (230 lb 6 1 oz), SpO2 98 %  Body mass index is 28 04 kg/m²  Physical Exam:   General: Alert, cooperative and no distress  Lower Extremities: Neurovascular status at baseline bilaterally, musculoskeletal function within normal limits bilaterally, no calf tenderness bilaterally, left foot dressings are clean, dry and intact          Lab, Imaging and other studies:   Results from last 7 days   Lab Units 01/02/21  0602   WBC Thousand/uL 8 60   HEMOGLOBIN g/dL 9 5*   HEMATOCRIT % 29 3*   PLATELETS Thousands/uL 223   NEUTROS PCT % 68   LYMPHS PCT % 21   MONOS PCT % 7   EOS PCT % 2     Results from last 7 days   Lab Units 01/02/21  0602   POTASSIUM mmol/L 4 5   CHLORIDE mmol/L 104   CO2 mmol/L 25   BUN mg/dL 40*   CREATININE mg/dL 2 28*   CALCIUM mg/dL 9 1                     Imaging: I have personally reviewed pertinent films and/or reports in PACS  EKG, Pathology, and Other Studies: I have personally reviewed pertinent reports  Portions of the record may have been created with voice recognition software  Occasional wrong word or "sound a like" substitutions may have occurred due to the inherent limitations of voice recognition software  Read the chart carefully and recognize, using context, where substitutions have occurred      Becki Berry DPM

## 2021-01-06 NOTE — PLAN OF CARE
Problem: Potential for Falls  Goal: Patient will remain free of falls  Description: INTERVENTIONS:  - Assess patient frequently for physical needs  -  Identify cognitive and physical deficits and behaviors that affect risk of falls    -  [place] fall precautions as indicated by assessment   - Educate patient/family on patient safety including physical limitations  - Instruct patient to call for assistance with activity based on assessment  - Modify environment to reduce risk of injury  - Consider OT/PT consult to assist with strengthening/mobility  Outcome: Progressing     Problem: MUSCULOSKELETAL - ADULT  Goal: Maintain or return mobility to safest level of function  Description: INTERVENTIONS:  - Assess patient's ability to carry out ADLs; assess patient's baseline for ADL function and identify physical deficits which impact ability to perform ADLs (bathing, care of mouth/teeth, toileting, grooming, dressing, etc )  - Assess/evaluate cause of self-care deficits   - Assess range of motion  - Assess patient's mobility  - Assess patient's need for assistive devices and provide as appropriate  - Encourage maximum independence but intervene and supervise when necessary  - Involve family in performance of ADLs  - Assess for home care needs following discharge   - Consider OT consult to assist with ADL evaluation and planning for discharge  - Provide patient education as appropriate  Outcome: Progressing  Goal: Maintain proper alignment of affected body part  Description: INTERVENTIONS:  - Support, maintain and protect limb and body alignment  - Provide patient/ family with appropriate education  Outcome: Progressing     Problem: PAIN - ADULT  Goal: Verbalizes/displays adequate comfort level or baseline comfort level  Description: Interventions:  - Encourage patient to monitor pain and request assistance  - Assess pain using appropriate pain scale  - Administer analgesics based on type and severity of pain and evaluate response  - Implement non-pharmacological measures as appropriate and evaluate response  - Consider cultural and social influences on pain and pain management  - Notify physician/advanced practitioner if interventions unsuccessful or patient reports new pain  Outcome: Progressing     Problem: INFECTION - ADULT  Goal: Absence or prevention of progression during hospitalization  Description: INTERVENTIONS:  - Assess and monitor for signs and symptoms of infection  - Monitor lab/diagnostic results  - Monitor all insertion sites, i e  indwelling lines, tubes, and drains  - Monitor endotracheal if appropriate and nasal secretions for changes in amount and color  - [place] appropriate cooling/warming therapies per order  - Administer medications as ordered  - Instruct and encourage patient and family to use good hand hygiene technique  - Identify and instruct in appropriate isolation precautions for identified infection/condition  Outcome: Progressing  Goal: Absence of fever/infection during neutropenic period  Description: INTERVENTIONS:  - Monitor WBC    Outcome: Progressing     Problem: SAFETY ADULT  Goal: Patient will remain free of falls  Description: INTERVENTIONS:  - Assess patient frequently for physical needs  -  Identify cognitive and physical deficits and behaviors that affect risk of falls    -  [place] fall precautions as indicated by assessment   - Educate patient/family on patient safety including physical limitations  - Instruct patient to call for assistance with activity based on assessment  - Modify environment to reduce risk of injury  - Consider OT/PT consult to assist with strengthening/mobility  Outcome: Progressing  Goal: Maintain or return to baseline ADL function  Description: INTERVENTIONS:  -  Assess patient's ability to carry out ADLs; assess patient's baseline for ADL function and identify physical deficits which impact ability to perform ADLs (bathing, care of mouth/teeth, toileting, grooming, dressing, etc )  - Assess/evaluate cause of self-care deficits   - Assess range of motion  - Assess patient's mobility; develop plan if impaired  - Assess patient's need for assistive devices and provide as appropriate  - Encourage maximum independence but intervene and supervise when necessary  - Involve family in performance of ADLs  - Assess for home care needs following discharge   - Consider OT consult to assist with ADL evaluation and planning for discharge  - Provide patient education as appropriate  Outcome: Progressing  Goal: Maintain or return mobility status to optimal level  Description: INTERVENTIONS:  - Assess patient's baseline mobility status (ambulation, transfers, stairs, etc )    - Identify cognitive and physical deficits and behaviors that affect mobility  - Identify mobility aids required to assist with transfers and/or ambulation (gait belt, sit-to-stand, lift, walker, cane, etc )  - [place] fall precautions as indicated by assessment  - Record patient progress and toleration of activity level on Mobility SBAR; progress patient to next Phase/Stage  - Instruct patient to call for assistance with activity based on assessment  - Consider rehabilitation consult to assist with strengthening/weightbearing, etc   Outcome: Progressing     Problem: DISCHARGE PLANNING  Goal: Discharge to home or other facility with appropriate resources  Description: INTERVENTIONS:  - Identify barriers to discharge w/patient and caregiver  - Arrange for needed discharge resources and transportation as appropriate  - Identify discharge learning needs (meds, wound care, etc )  - Arrange for interpretive services to assist at discharge as needed  - Refer to Case Management Department for coordinating discharge planning if the patient needs post-hospital services based on physician/advanced practitioner order or complex needs related to functional status, cognitive ability, or social support system  Outcome: Progressing     Problem: Knowledge Deficit  Goal: Patient/family/caregiver demonstrates understanding of disease process, treatment plan, medications, and discharge instructions  Description: Complete learning assessment and assess knowledge base    Interventions:  - Provide teaching at level of understanding  - Provide teaching via preferred learning methods  Outcome: Progressing     Problem: Prexisting or High Potential for Compromised Skin Integrity  Goal: Skin integrity is maintained or improved  Description: INTERVENTIONS:  - Identify patients at risk for skin breakdown  - Assess and monitor skin integrity  - Assess and monitor nutrition and hydration status  - Monitor labs   - Assess for incontinence   - Turn and reposition patient  - Assist with mobility/ambulation  - Relieve pressure over bony prominences  - Avoid friction and shearing  - Provide appropriate hygiene as needed including keeping skin clean and dry  - Evaluate need for skin moisturizer/barrier cream  - Collaborate with interdisciplinary team   - Patient/family teaching  - Consider wound care consult   Outcome: Progressing     Problem: GASTROINTESTINAL - ADULT  Goal: Minimal or absence of nausea and/or vomiting  Description: INTERVENTIONS:  - Administer IV fluids if ordered to ensure adequate hydration  - Maintain NPO status until nausea and vomiting are resolved  - Nasogastric tube if ordered  - Administer ordered antiemetic medications as needed  - Provide nonpharmacologic comfort measures as appropriate  - Advance diet as tolerated, if ordered  - Consider nutrition services referral to assist patient with adequate nutrition and appropriate food choices  Outcome: Progressing  Goal: Maintains or returns to baseline bowel function  Description: INTERVENTIONS:  - Assess bowel function  - Encourage oral fluids to ensure adequate hydration  - Administer IV fluids if ordered to ensure adequate hydration  - Administer ordered medications as needed  - Encourage mobilization and activity  - Consider nutritional services referral to assist patient with adequate nutrition and appropriate food choices  Outcome: Progressing  Goal: Maintains adequate nutritional intake  Description: INTERVENTIONS:  - Monitor percentage of each meal consumed  - Identify factors contributing to decreased intake, treat as appropriate  - Assist with meals as needed  - Monitor I&O, weight, and lab values if indicated  - Obtain nutrition services referral as needed  Outcome: Progressing

## 2021-01-06 NOTE — NURSING NOTE
Report called to receiving facility  Pt D/L CVC dc'd by IR prior to leaving  Pt medicated for pain in LLE at request  Escorted by [facility] transport crew  Belongings with pt

## 2021-01-06 NOTE — ASSESSMENT & PLAN NOTE
Lab Results   Component Value Date    HGBA1C 7 4 (H) 12/18/2020     Recent Labs     01/04/21  2044 01/05/21  0714 01/05/21  1151 01/05/21  1645   POCGLU 165* 103 121 157*   A1c at goal  Blood sugar stable  Perioperative blood sugar goal less than 180  Currently off Lantus and just on sliding scale insulin, and blood sugars all under 150

## 2021-01-06 NOTE — PROGRESS NOTES
Progress Note - Molly Douglas 1945, 66 y o  male MRN: [id number]    Unit/Bed#: E5 -01 Encounter: [id number]    Primary Care Provider: Lorrain Scheuermann, MD   Date and time admitted to hospital: 12/18/2020 11:52 AM        MSSA bacteremia  Assessment & Plan  · Related to foot infection  · WIthout endocarditis with rapid clearance  · Management per ID    -  IV cefepime until 1/4/21 per ID (14 days total)    Anemia of chronic disease  Assessment & Plan  Stable  Transfuse as needed    Acute kidney injury superimposed on chronic kidney disease ([facility] )  Assessment & Plan  GARY on CKD stage IV  GARY has resolved  Creatinine is stable and at baseline  Clinically no sign of pedal edema  He uses lasix prn and this is not needed at this time    Type 2 diabetes mellitus, with long-term current use of insulin [facility])  Assessment & Plan  Lab Results   Component Value Date    HGBA1C 7 4 (H) 12/18/2020     Recent Labs     01/04/21  2044 01/05/21  0714 01/05/21  1151 01/05/21  1645   POCGLU 165* 103 121 157*   A1c at goal  Blood sugar stable  Perioperative blood sugar goal less than 180  Currently off Lantus and just on sliding scale insulin, and blood sugars all under 150    Hypertension with renal disease  Assessment & Plan  Continue metoprolol succinate 100 mg daily and imdur 90 mg daily      CAD (coronary artery disease)  Assessment & Plan  Stable     Tolerated his procedure without any cardiac complication  Continue  aspirin 81 mg daily, metoprolol succinate 100 mg daily, and Imdur 90 mg daily    * Subacute osteomyelitis of left foot ([facility] )  Assessment & Plan  · Management per primary service    - s/p RAY RESECTION FOOT (Left) by Podiatry 12/21/2020  - status post partial left 5th metatarsal resection due to + margins 12/31/2020    Will be discharged with 2 weeks of intravenous antibiotics  Has PICC line placed by Interventional Radiology on 12/24 in place      [facility] Internal Medicine Progress Note  Patient: Javier Gorman Cecilia Cohn 66 y o  male   MRN: [id number]  PCP: Lela Kuhn MD  Unit/Bed#: E5 -70 Encounter: [id number]  Date Of Visit: 21    Assessment:    Principal Problem:    Subacute osteomyelitis of left foot ([address] )  Active Problems:    CAD (coronary artery disease)    Hypertension with renal disease    Type 2 diabetes mellitus, with long-term current use of insulin (HCC)    Acute kidney injury superimposed on chronic kidney disease (HCC)    Anemia of chronic disease    MSSA bacteremia      Plan:    · No objection to discharge  · Will continue to follow the patient is admitted       VTE Pharmacologic Prophylaxis:   Pharmacologic: Heparin  Mechanical VTE Prophylaxis in Place: Yes    Patient Centered Rounds: I have performed bedside rounds with nursing staff today  Discussions with Specialists or Other Care Team Provider:  Discussed with case management    Education and Discussions with Family / Patient:  Primary service to update patient's family    Time Spent for Care: 30 minutes  More than 50% of total time spent on counseling and coordination of care as described above  Current Length of Stay: 18 day(s)    Current Patient Status: Inpatient   Certification Statement: The patient will continue to require additional inpatient hospital stay due to Rehab placement    Discharge Plan / Estimated Discharge Date:  Tomorrow    Code Status: Level 1 - Full Code      Subjective:   Patient seen and examined, no acute complaints  Tolerating oral diet  A complete and comprehensive 14 point organ system review has been performed and all other systems are negative other than stated above  Objective:     Vitals:   Temp (24hrs), Av 6 °F (36 4 °C), Min:97 °F (36 1 °C), Max:98 2 °F (36 8 °C)    Temp:  [97 °F (36 1 °C)-98 2 °F (36 8 °C)] 97 °F (36 1 °C)  HR:  [60] 60  Resp:  [16-18] 18  BP: (139-168)/(86-96) 139/86  SpO2:  [95 %-100 %] 98 %  Body mass index is 28 07 kg/m²       Input and Output Summary (last 24 hours): Intake/Output Summary (Last 24 hours) at 1/5/2021 1908  Last data filed at 1/5/2021 1601  Gross per 24 hour   Intake --   Output 1250 ml   Net -1250 ml       Physical Exam:     General: well appearing, no acute distress  HEENT: atraumatic, PERRLA, moist mucosa, normal pharynx, normal tonsils and adenoids, normal tongue, no fluid in sinuses  Neck: Trachea midline, no carotid bruit, no masses  Respiratory: normal chest wall expansion, CTA B, no r/r/w, no rubs  Cardiovascular: RRR, no m/r/g, Normal S1 and S2  Abdomen: Soft, non-tender, non-distended, normal bowel sounds in all quadrants, no hepatosplenomegaly, no tympany  Rectal: deferred  Musculoskeletal: normal ROM in upper and lower extremities  Integumentary:  Dressing clear dry and intact Heme/Lymph: no lymphadenopathy, no bruises  Neurological: Cranial Nerves II-XII grossly intact, no tics, normal sensation to pressure and light touch  Psychiatric: cooperative with normal mood, affect, and cognition      Additional Data:     Labs:    Results from last 7 days   Lab Units 01/02/21  0602   WBC Thousand/uL 8 60   HEMOGLOBIN g/dL 9 5*   HEMATOCRIT % 29 3*   PLATELETS Thousands/uL 223   NEUTROS PCT % 68   LYMPHS PCT % 21   MONOS PCT % 7   EOS PCT % 2     Results from last 7 days   Lab Units 01/02/21  0602   POTASSIUM mmol/L 4 5   CHLORIDE mmol/L 104   CO2 mmol/L 25   BUN mg/dL 40*   CREATININE mg/dL 2 28*   CALCIUM mg/dL 9 1           * I Have Reviewed All Lab Data Listed Above  * Additional Pertinent Lab Tests Reviewed:  [facility] Admission Reviewed    Imaging:    Imaging Reports Reviewed Today Include:  No new imaging  Imaging Personally Reviewed by Myself Includes:  No new imaging    Recent Cultures (last 7 days):           Last 24 Hours Medication List:   Current Facility-Administered Medications   Medication Dose Route Frequency Provider Last Rate    ALPRAZolam  0 25 mg Oral HS PRN Ileana Chaudhary DPM      aspirin  81 mg Oral Daily Jamal Zaragoza DPM      docusate sodium  100 mg Oral BID Dede Cordero MD      fluticasone  1 spray Each Nare Daily Matthew Salinas      gabapentin  600 mg Oral Daily Jamal Zaragoza DPM      heparin (porcine)  5,000 Units Subcutaneous Q12H [facility] Matthew Salinas      insulin lispro  1-5 Units Subcutaneous 4x Daily (AC & HS) Jamla Zaragoza DPM      isosorbide mononitrate  90 mg Oral Daily Jamal Zaragoaz DPM      latanoprost  1 drop Both Eyes HS Jamal Zaragoza DPM      loperamide  2 mg Oral TID PRN Jamal Zaragoza, DEJUAN      melatonin  3 mg Oral HS PRN Jamal Zaragoza DPM      metoprolol succinate  100 mg Oral Daily Jamal Zaragoza DPM      ondansetron  4 mg Intravenous Q6H PRN Jamal Zaragoza, DEJUAN      oxyCODONE  5 mg Oral Q6H PRN Jamal Zaragoza, DEJUAN      pantoprazole  40 mg Oral Early Morning Jamal Zaragoza DPM      senna  1 tablet Oral HS Dede Cordero MD      sertraline  50 mg Oral Daily Jamal Zaragoza DPM      tamsulosin  0 4 mg Oral HS KRISTOFER SalinasM      traMADol  50 mg Oral Q12H PRN Jamal Zaragoza DPM          Today, Patient Was Seen By: Gucci Choi DO    ** Please Note: This note has been constructed using a voice recognition system   **

## 2021-01-06 NOTE — SOCIAL WORK
JASON received a PC from Monica at [facility] stating that the pt can be admitted there today  She report's the pt had called and spoke with admissions and a manager as he was really interrested in attending this facility  JASON notified Monica that the pt has a bed a [facility] and they are expecting him today at [facility] made a PC to pt's room to discuss this matter  Pt report's he want's to go to [facility] and not [facility] at this due to the fact that he will have a private room/bathroom at [facility] and not at [facility]  JASON made a PC to [facility] to change transport to [facility]  Pt will be transported today to [facility] between 3-3:30PM via [address] transport  Pt will be in room 313A  Report number is [phone]  Fax number is [phone]  Attending resident, assigned nurse and pt have been notified  Pt is medium risk for readmission, however is going to rehab and therefore will not need a PCP appt on today's discharge  At this time there are no other CM needs

## 2021-01-06 NOTE — SEDATION DOCUMENTATION
Removed pt Right IJ central line bedside  Pt placed semi erect position in bed  Catheter removed with no immediate concerns  Manual pressure applied  Gauze and tape to site  Pt instructed to sit up for the next hour

## 2021-01-07 NOTE — PROGRESS NOTES
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 2 diabetes mellitus, 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 [facility])  Assessment & Plan  Lab Results   Component Value Date    HGBA1C 7 4 (H) 12/18/2020       Recent Labs     01/05/21  1645 01/05/21  2104 01/06/21  0736 01/06/21  1033   POCGLU 157* 122 98 157*       Blood Sugar Average: Last 72 hrs:  (P) 131 [id number]   Underwent definitive surgical management by the podiatry service  He received 14 days of antibiotics here, and is now clear of infection  Did have Staph bacteremia managed by the infectious disease service    Type 2 diabetes mellitus, with long-term current use of insulin [facility])  Assessment & Plan  Lab Results   Component Value Date    HGBA1C 7 4 (H) 12/18/2020     Recent Labs     01/05/21  1645 01/05/21  2104 01/06/21  0736 01/06/21  1033   POCGLU 157* 122 98 157*   A1c at goal  Blood sugar stable  Perioperative blood sugar goal less than 180  Currently off Lantus and just on sliding scale insulin, and blood sugars all under 150    Hypertension with renal disease  Assessment & Plan  Continue metoprolol succinate 100 mg daily and imdur 90 mg daily      CAD (coronary artery disease)  Assessment & Plan  Stable     Tolerated his procedure without any cardiac complication  Continue  aspirin 81 mg daily, metoprolol succinate 100 mg daily, and Imdur 90 mg daily      [facility] Internal Medicine Progress Note  Patient: Chrissy Felton 66 y o  male   MRN: [id number]  PCP: Floyd Jasmine MD  Unit/Bed#: E5 -14 Encounter: [id number]  Date Of Visit: 01/06/21    Assessment:    Active Problems:    CAD (coronary artery disease)    Hypertension with renal disease    Type 2 diabetes mellitus, with long-term current use of insulin (HCC)    Diabetic ulcer of left foot associated with type 2 diabetes mellitus, 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)    Anemia of chronic disease      Plan:    · No objection to discharge  · Will remove central line prior to discharge       VTE Pharmacologic Prophylaxis:   Pharmacologic: Heparin  Mechanical VTE Prophylaxis in Place: Yes    Patient Centered Rounds: I have performed bedside rounds with nursing staff today  Discussions with Specialists or Other Care Team Provider:  Case management    Education and Discussions with Family / Patient:  Patient will update family    Time Spent for Care: 30 minutes  More than 50% of total time spent on counseling and coordination of care as described above  Current Length of Stay: 19 day(s)    Current Patient Status: Inpatient   Certification Statement: The patient will continue to require additional inpatient hospital stay due to To discharge planning    Discharge Plan / Estimated Discharge Date:  Today    Code Status: Prior      Subjective:   Patient seen examined, no acute complaints  He is anxious for discharge to rehabilitation today  Tolerating oral diet  Pain is well controlled    A complete and comprehensive 14 point organ system review has been performed and all other systems are negative other than stated above  Objective:     Vitals:   Temp (24hrs), Av 9 °F (36 6 °C), Min:97 7 °F (36 5 °C), Max:98 1 °F (36 7 °C)    Temp:  [97 7 °F (36 5 °C)-98 1 °F (36 7 °C)] 98 1 °F (36 7 °C)  HR:  [62-67] 67  Resp:  [16-18] 16  BP: (134-148)/(81-82) 134/81  SpO2:  [98 %-99 %] 98 %  Body mass index is 27 91 kg/m²  Input and Output Summary (last 24 hours):        Intake/Output Summary (Last 24 hours) at 2021 2145  Last data filed at 2021 2343  Gross per 24 hour   Intake --   Output 400 ml   Net -400 ml       Physical Exam:     General: well appearing, no acute distress  HEENT: atraumatic, PERRLA, moist mucosa, normal pharynx, normal tonsils and adenoids, normal tongue, no fluid in sinuses  Neck: Trachea midline, no carotid bruit, no masses  Respiratory: normal chest wall expansion, CTA B, no r/r/w, no rubs  Cardiovascular: RRR, no m/r/g, Normal S1 and S2  Abdomen: Soft, non-tender, non-distended, normal bowel sounds in all quadrants, no hepatosplenomegaly, no tympany  Rectal: deferred  Musculoskeletal: normal ROM in upper and lower extremities  Integumentary:  Dressing clear dry and intact  Heme/Lymph: no lymphadenopathy, no bruises  Neurological: Cranial Nerves II-XII grossly intact, no tics, normal sensation to pressure and light touch  Psychiatric: cooperative with normal mood, affect, and cognition      Additional Data:     Labs:    Results from last 7 days   Lab Units 01/02/21  0602   WBC Thousand/uL 8 60   HEMOGLOBIN g/dL 9 5*   HEMATOCRIT % 29 3*   PLATELETS Thousands/uL 223   NEUTROS PCT % 68   LYMPHS PCT % 21   MONOS PCT % 7   EOS PCT % 2     Results from last 7 days   Lab Units 01/02/21  0602   POTASSIUM mmol/L 4 5   CHLORIDE mmol/L 104   CO2 mmol/L 25   BUN mg/dL 40*   CREATININE mg/dL 2 28*   CALCIUM mg/dL 9 1           * I Have Reviewed All Lab Data Listed Above  * Additional Pertinent Lab Tests Reviewed: [address] Admission Reviewed    Imaging:  No new imaging    Recent Cultures (last 7 days):           Last 24 Hours Medication List:        Today, Patient Was Seen By: Lauren Khanna DO    ** Please Note: This note has been constructed using a voice recognition system   **

## 2021-01-07 NOTE — ASSESSMENT & PLAN NOTE
Lab Results   Component Value Date    HGBA1C 7 4 (H) 12/18/2020     Recent Labs     01/05/21  1645 01/05/21  2104 01/06/21  0736 01/06/21  1033   POCGLU 157* 122 98 157*   A1c at goal  Blood sugar stable  Perioperative blood sugar goal less than 180  Currently off Lantus and just on sliding scale insulin, and blood sugars all under 150

## 2021-01-08 ENCOUNTER — TELEPHONE (OUTPATIENT)
Dept: VASCULAR SURGERY | Facility: CLINIC | Age: 76
End: 2021-01-08

## 2021-01-08 ENCOUNTER — TELEPHONE (OUTPATIENT)
Dept: NON INVASIVE DIAGNOSTICS | Facility: CLINIC | Age: 76
End: 2021-01-08

## 2021-01-08 NOTE — TELEPHONE ENCOUNTER
COVID Pre-Visit Screening     1  Is this a family member screening? No  2  Have you traveled outside of your state in the past 2 weeks? No  3  Do you presently have a fever or flu-like symptoms? No  4  Do you have symptoms of an upper respiratory infection like runny nose, sore throat, or cough? No  5  Are you suffering from new headache that you have not had in the past?  No  6  Do you have/have you experienced any new shortness of breath recently? No  7  Do you have any new diarrhea, nausea or vomiting? No  8  Have you been in contact with anyone who has been sick or diagnosed with COVID-19? No  9  Do you have any new loss of taste or smell? No  10  Are you able to wear a mask without a valve for the entire visit? Yes  11  Confirmed with nursing home provider, date, time and location address  Nursing home verbalized understanding

## 2021-01-18 ENCOUNTER — TELEPHONE (OUTPATIENT)
Dept: NON INVASIVE DIAGNOSTICS | Facility: CLINIC | Age: 76
End: 2021-01-18

## 2021-01-18 ENCOUNTER — TRANSITIONAL CARE MANAGEMENT (OUTPATIENT)
Dept: INTERNAL MEDICINE CLINIC | Facility: CLINIC | Age: 76
End: 2021-01-18

## 2021-01-18 NOTE — TELEPHONE ENCOUNTER

## 2021-01-19 ENCOUNTER — OFFICE VISIT (OUTPATIENT)
Dept: VASCULAR SURGERY | Facility: CLINIC | Age: 76
End: 2021-01-19
Payer: MEDICARE

## 2021-01-19 VITALS
WEIGHT: 225 LBS | TEMPERATURE: 98.1 F | SYSTOLIC BLOOD PRESSURE: 126 MMHG | BODY MASS INDEX: 27.4 KG/M2 | DIASTOLIC BLOOD PRESSURE: 68 MMHG | HEIGHT: 76 IN | HEART RATE: 66 BPM

## 2021-01-19 DIAGNOSIS — E11.621 DIABETIC ULCER OF LEFT MIDFOOT ASSOCIATED WITH TYPE 2 DIABETES MELLITUS, WITH BONE INVOLVEMENT WITHOUT EVIDENCE OF NECROSIS (HCC): Primary | ICD-10-CM

## 2021-01-19 DIAGNOSIS — I73.9 PVD (PERIPHERAL VASCULAR DISEASE) (HCC): ICD-10-CM

## 2021-01-19 DIAGNOSIS — N18.30 TYPE 2 DIABETES MELLITUS WITH STAGE 3 CHRONIC KIDNEY DISEASE, WITH LONG-TERM CURRENT USE OF INSULIN, UNSPECIFIED WHETHER STAGE 3A OR 3B CKD (HCC): ICD-10-CM

## 2021-01-19 DIAGNOSIS — L97.426 DIABETIC ULCER OF LEFT MIDFOOT ASSOCIATED WITH TYPE 2 DIABETES MELLITUS, WITH BONE INVOLVEMENT WITHOUT EVIDENCE OF NECROSIS (HCC): Primary | ICD-10-CM

## 2021-01-19 DIAGNOSIS — E11.22 TYPE 2 DIABETES MELLITUS WITH STAGE 3 CHRONIC KIDNEY DISEASE, WITH LONG-TERM CURRENT USE OF INSULIN, UNSPECIFIED WHETHER STAGE 3A OR 3B CKD (HCC): ICD-10-CM

## 2021-01-19 DIAGNOSIS — Z79.4 TYPE 2 DIABETES MELLITUS WITH STAGE 3 CHRONIC KIDNEY DISEASE, WITH LONG-TERM CURRENT USE OF INSULIN, UNSPECIFIED WHETHER STAGE 3A OR 3B CKD (HCC): ICD-10-CM

## 2021-01-19 PROCEDURE — 99214 OFFICE O/P EST MOD 30 MIN: CPT | Performed by: SURGERY

## 2021-01-19 NOTE — ASSESSMENT & PLAN NOTE
PAD with diabetic foot ulcer s/p left 5th metatarsal resection 12/21 with revision of resection site due to persistent osteomyelitis on 12/31  He had unreliable PORTER/distal waveforms in setting of DMII on most recent arterial duplex but biphasic/triphasic waveforms were noted throughout on duplex imaging  Adequate bleeding was also noted in the OR during surgery  Presents for post-operative follow-up  He is doing well and was just discharged from rehab yesterday  He states he was followed by a podiatrist while at the rehab facility for local wound care  The incision is not fully healed  There is slight dehiscence but nylon sutures remain intact  No erythema or drainage  See picture below  He has follow-up scheduled with podiatry tomorrow          -We discussed the pathophysiology of arterial occlusive disease, available treatment options and indications for treatment   -Continue medical optimization  Currently on ASA  Goal hgb A1c<7 0, LDL<70, BP <130/80  -Continue offloading until healed  Follow-up with podiatry tomorrow   -If any concerns for poor wound healing per podiatry,he may need to return earlier to schedule a left lower extremity angiogram to assess for any significant arterial disease that could be optimized for wound healing   -Follow-up in 1 month to assess for wound healing

## 2021-01-19 NOTE — LETTER
January 19, 2021     Naheed Godwin MD  [address]    Patient: Celeste Severin   YOB: 1945   Date of Visit: 1/19/2021       Dear Dr Alfred Daniel: Thank you for referring Rosalind Dickson to me for evaluation  Below are the relevant portions of my assessment and plan of care  Diagnoses and all orders for this visit:    PVD (peripheral vascular disease) ([address] )  PAD with diabetic foot ulcer s/p left 5th metatarsal resection 12/21 with revision of resection site due to persistent osteomyelitis on 12/31  He had unreliable PORTER/distal waveforms in setting of DMII on most recent arterial duplex but biphasic/triphasic waveforms were noted throughout on duplex imaging  Adequate bleeding was also noted in the OR during surgery      Presents for post-operative follow-up  He is doing well and was just discharged from rehab yesterday  He states he was followed by a podiatrist while at the rehab facility for local wound care      The incision is not fully healed  There is slight dehiscence but nylon sutures remain intact  No erythema or drainage  See picture below  He has follow-up scheduled with podiatry tomorrow            -We discussed the pathophysiology of arterial occlusive disease, available treatment options and indications for treatment   -Continue medical optimization  Currently on ASA  Goal hgb A1c<7 0, LDL<70, BP <130/80  -Continue offloading until healed  Follow-up with podiatry tomorrow   -If any concerns for poor wound healing per podiatry,he may need to return earlier to schedule a left lower extremity angiogram to assess for any significant arterial disease that could be optimized for wound healing   -Follow-up in 1 month to assess for wound healing         Diabetic ulcer of left foot associated with type 2 diabetes mellitus, 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)  DMII with left diabetic foot ulcer  Goal hgbA1c < 7 0  On insulin   Recommend close follow-up with PCP to continue to optimize BS for wound healing  Last BS on most recent labwork appear to be well controlled        If you have questions, please do not hesitate to call me  I look forward to following Suzanne Campos along with you           Sincerely,        Alok Diggs MD        CC: No Recipients

## 2021-01-19 NOTE — PROGRESS NOTES
Assessment/Plan:    PVD (peripheral vascular disease) ([facility] [address] )  PAD with diabetic foot ulcer s/p left 5th metatarsal resection 12/21 with revision of resection site due to persistent osteomyelitis on 12/31  He had unreliable PORTER/distal waveforms in setting of DMII on most recent arterial duplex but biphasic/triphasic waveforms were noted throughout on duplex imaging  Adequate bleeding was also noted in the OR during surgery  Presents for post-operative follow-up  He is doing well and was just discharged from rehab yesterday  He states he was followed by a podiatrist while at the rehab facility for local wound care  The incision is not fully healed  There is slight dehiscence but nylon sutures remain intact  No erythema or drainage  See picture below  He has follow-up scheduled with podiatry tomorrow          -We discussed the pathophysiology of arterial occlusive disease, available treatment options and indications for treatment   -Continue medical optimization  Currently on ASA  Goal hgb A1c<7 0, LDL<70, BP <130/80  -Continue offloading until healed  Follow-up with podiatry tomorrow   -If any concerns for poor wound healing per podiatry,he may need to return earlier to schedule a left lower extremity angiogram to assess for any significant arterial disease that could be optimized for wound healing   -Follow-up in 1 month to assess for wound healing  Diabetic ulcer of left foot associated with type 2 diabetes mellitus, 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)  DMII with left diabetic foot ulcer  Goal hgbA1c < 7 0  On insulin  Recommend close follow-up with PCP to continue to optimize BS for wound healing  Last BS on most recent labwork appear to be well controlled      Lab Results   Component Value Date    HGBA1C 7 7 (H) 01/11/2021        Diagnoses and all orders for this visit:    Diabetic ulcer of left midfoot associated with type 2 diabetes mellitus, 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 ([facility] )    PVD (peripheral vascular disease) ([facility] )    Type 2 diabetes mellitus with stage 3 chronic kidney disease, with long-term current use of insulin, unspecified whether stage 3a or 3b CKD ([facility] )        I have spent 25 minutes with Patient  today in which greater than 50% of this time was spent in counseling/coordination of care regarding Intructions for management, Importance of tx compliance and Impressions  Subjective:      Patient ID: Chrissy Felton is a 66 y o  male  Pt is new to our office  He was referred here by Dr Floyd Jasmine MD  Pt is s/p resection of left foot done 12/21/2020 by Sean Beltrán, Dr Sailaja Acosta  Pt had a DASH done 12/21/2020  Pt had developed a wound on his left foot under the 5th toe about 2 months ago  Pt denies any pain in the leg  Pt has a hx of bladder cancer, diabetes, ckd  He is currently taking ASA 81 mg  Pt is former smoker  HPI  Mr  Macy Jenkins is a 64yo male former smoker with HTN, DMII, CKD, CAD, bladder cancer, left diabetic foot wound s/p left 5th metatarsal resection and revision for osteomyelitis who presents for follow-up  He had noncompressible/noncalcified vessels and unreliable PORTER/MTP/GTP on arterial duplex during his hospitalization but waveforms appeared triphasic throughout  He underwent left 5th metatarsal resection with evidence of good bleeding from the wound bed  He subsequently went to rehab and was discharged yesterday  He has been doing well without complaints  He denies fevers or chills  He has had no redness or drainage from the left foot wound and states that podiatry has been seeing him at his rehab facility  He has follow-up with them tomorrow      The following portions of the patient's history were reviewed and updated as appropriate: allergies, current medications, past family history, past medical history, past social history, past surgical history and problem list     I have reviewed and made appropriate changes to the review of systems input by the medical assistant  Vitals:    01/19/21 0916   BP: 126/68   BP Location: Left arm   Patient Position: Sitting   Cuff Size: Standard   Pulse: 66   Temp: 98 1 °F (36 7 °C)   TempSrc: Tympanic   Weight: 102 kg (225 lb)   Height: 6' 4" (1 93 m)       Patient Active Problem List   Diagnosis    CAD (coronary artery disease)    Carcinoma in situ of bladder    Hypertension with renal disease    Hyperlipidemia    Presence of stent in coronary artery    Type 2 diabetes mellitus, with long-term current use of insulin (HCC)    CKD (chronic kidney disease) stage 3, GFR 30-59 ml/min (HCC)    Primary osteoarthritis of left knee    Malignant tumor of urinary bladder (HCC)    Cystitis due to intravesical BCG administration    Degeneration of lumbar intervertebral disc    Back pain    Arteriosclerosis of artery of extremity (HCC)    Stable angina pectoris (HCC)    Herpes zoster without complication    Localized edema    Superficial phlebitis    Preop examination    Acute cystitis without hematuria    Secondary renal hyperparathyroidism (HCC)    Bladder cancer (HCC)    Abnormal MRI, lumbar spine    Diabetic polyneuropathy associated with type 2 diabetes mellitus ([address] )    Dysuria    Diabetic ulcer of left foot associated with type 2 diabetes mellitus, 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)    CKD (chronic kidney disease) stage 4, GFR 15-29 ml/min ([facility])    Chronic anemia    Anemia of chronic disease    Preoperative examination    PVD (peripheral vascular disease) ([address] )    Left carotid bruit       Past Surgical History:   Procedure Laterality Date    CARDIAC SURGERY      Cath stent placement  Last assessed: 3/9/17  Interventional Catheterization    CHOLECYSTECTOMY      COLONOSCOPY      CYSTOSCOPY      Diagnostic w/biopsy  Bhavin Webber  Last assessed: 12/1/14    CYSTOURETHROSCOPY      w/cautery   Bhavin Webber    GASTRIC BYPASS      For morbid obesity w/Shaji-en-Y  Resolved: 11/17/09    INCISION AND DRAINAGE OF WOUND Right 2/26/2017    Procedure: INCISION AND DRAINAGE (I&D) EXTREMITY WITH APPLICATION OF GRAFT JACKET;  Surgeon: Janae Rutherford DPM;  [facility];  Service:     INCISION AND DRAINAGE OF WOUND Right 4/25/2017    Procedure: INCISION AND DRAINAGE (I&D) EXTREMITY, APPLICATION OF GRAFT;  Surgeon: Janae Rutherford DPM;  [facility];  Service:     IR BIOPSY OTHER  7/2/2020    IR TUNNELED CENTRAL LINE PLACEMENT  12/24/2020    JOINT REPLACEMENT      Knee   Last assessed: 1/28/11    NH AMPUTATION METATARSAL+TOE,SINGLE Left 12/21/2020    Procedure: RAY RESECTION FOOT;  Surgeon: Inga Lincoln DPM;  [facility];  Service: Podiatry    NH AMPUTATION METATARSAL+TOE,SINGLE Left 12/31/2020    Procedure: 5TH MET RESECTION;  Surgeon: Inga Lincoln DPM;  [facility];  Service: Podiatry    NH CYSTOURETHROSCOPY,BIOPSY N/A 8/16/2016    Procedure: Ar Jazmin;  Surgeon: Juan Pablo Esquivel MD;  [facility];  Service: Urology    NH CYSTOURETHROSCOPY,FULGUR <0 5 CM LESN N/A 11/19/2020    Procedure: CYSTO W/BIOPSIES, transurethral prostate bx;  Surgeon: Armin Robles MD;  [facility];  Service: Urology    ROTATOR CUFF REPAIR      SMALL INTESTINE SURGERY      Surgery Shaji-en-Y    SPINAL FUSION      lumbar and cervical fusions    VAC DRESSING APPLICATION Right 0/70/1814    Procedure: APPLICATION VAC DRESSING;  Surgeon: Janae Rutherford DPM;  [facility];  Service:        Family History   Problem Relation Age of Onset    Diabetes Mother     Heart disease Mother     Other Mother         High blood pressure    Heart disease Father     Diabetes Sister     Other Sister         High blood pressure    Kidney disease Sister     Heart disease Brother     Other Brother         High blood pressure       Social History     Socioeconomic History    Marital status: /Civil Union Spouse name: Not on file    Number of children: Not on file    Years of education: Not on file    Highest education level: Not on file   Occupational History    Not on file   Social Needs    Financial resource strain: Not on file    Food insecurity     Worry: Not on file     Inability: Not on file    Transportation needs     Medical: Not on file     Non-medical: Not on file   Tobacco Use    Smoking status: Former Smoker     Quit date: 1970     Years since quittin 0    Smokeless tobacco: Never Used   Substance and Sexual Activity    Alcohol use: Yes     Frequency: 2-3 times a week     Drinks per session: 1 or 2     Binge frequency: Never    Drug use: Not Currently     Types: Marijuana     Comment: quit 2019 had medical marijuana    Sexual activity: Not Currently   Lifestyle    Physical activity     Days per week: Not on file     Minutes per session: Not on file    Stress: Not on file   Relationships    Social connections     Talks on phone: Not on file     Gets together: Not on file     Attends Sikhism service: Not on file     Active member of club or organization: Not on file     Attends meetings of clubs or organizations: Not on file     Relationship status: Not on file    Intimate partner violence     Fear of current or ex partner: Not on file     Emotionally abused: Not on file     Physically abused: Not on file     Forced sexual activity: Not on file   Other Topics Concern    Not on file   Social History Narrative    Consumes 1 cup of coffee and 1 soda per day       Allergies   Allergen Reactions    Atorvastatin Hives, Itching and Rash    Simvastatin Rash and Edema     "ALL STATINS"    Statins Itching    Insulin Lispro Swelling and Edema    Medical Tape      rash to electrodes    Other Itching, Rash and Other (See Comments)     rash to electrodes and adhesives         Current Outpatient Medications:     ALPRAZolam (XANAX) 0 25 mg tablet, At twice a day as needed for anxiety, Disp: 30 tablet, Rfl: 0    aspirin 81 mg chewable tablet, Chew 1 tablet (81 mg total) daily, Disp: 30 tablet, Rfl: 0    Azelastine HCl 0 15 % SOLN, Inhale 1 spray 2 (two) times a day, Disp: 30 mL, Rfl: 3    bismuth subsalicylate (PEPTO BISMOL) 262 MG chewable tablet, Chew 524 mg daily as needed for indigestion  , Disp: , Rfl:     calcitriol (ROCALTROL) 0 25 mcg capsule, , Disp: , Rfl:     Cholecalciferol (Vitamin D3) 1 25 MG (65955 UT) CAPS, TAKE 1 CAPSULE BY MOUTH EVERY 30 DAYS, Disp: 12 capsule, Rfl: 0    docusate sodium (COLACE) 100 mg capsule, Take 1 capsule (100 mg total) by mouth 2 (two) times a day, Disp: 10 capsule, Rfl: 0    fluticasone (FLONASE) 50 mcg/act nasal spray, One spray in each nostril twice a day, Disp: 1 Bottle, Rfl: 3    furosemide (LASIX) 20 mg tablet, Take 1 tablet every other day or as needed for edema - instruction change 6/24/20, Disp: 90 tablet, Rfl: 1    gabapentin (NEURONTIN) 300 mg capsule, Take 2 capsules (600 mg total) by mouth daily, Disp: 60 capsule, Rfl: 0    Insulin Pen Needle 31G X 8 MM MISC, Inject 3 times a day, Disp: 100 each, Rfl: 2    isosorbide mononitrate (IMDUR) 30 mg 24 hr tablet, Take 3 tablets (90 mg total) by mouth daily, Disp: 90 tablet, Rfl: 0    Lantus SoloStar 100 units/mL injection pen, 27 units qhs, Disp: 10 pen, Rfl: 1    latanoprost (XALATAN) 0 005 % ophthalmic solution, Administer 1 drop to both eyes daily at bedtime, Disp: , Rfl:     metoprolol succinate (TOPROL-XL) 100 mg 24 hr tablet, Take 1 tablet (100 mg total) by mouth daily, Disp: 30 tablet, Rfl: 0    multivitamin (THERAGRAN) TABS, Take 1 tablet by mouth daily  , Disp: , Rfl:     NovoLOG FlexPen 100 units/mL SOPN, 10 units with each meal (Patient taking differently: 10-12 units with each meal), Disp: 10 pen, Rfl: 1    pantoprazole (PROTONIX) 40 mg tablet, Take 1 tablet (40 mg total) by mouth daily in the early morning, Disp: 30 tablet, Rfl: 0    sertraline (ZOLOFT) 50 mg tablet, Take 1 tablet (50 mg total) by mouth daily, Disp: 30 tablet, Rfl: 3    tamsulosin (FLOMAX) 0 4 mg, Take 1 capsule (0 4 mg total) by mouth daily at bedtime, Disp: 30 capsule, Rfl: 0    insulin lispro (HumaLOG) 100 units/mL injection, Inject 1-5 Units under the skin 4 (four) times a day (before meals and at bedtime) (Patient not taking: Reported on 1/19/2021), Disp: 6 mL, Rfl: 0    Review of Systems   Constitutional: Negative  HENT: Positive for rhinorrhea  Eyes: Negative  Respiratory: Negative  Cardiovascular: Negative  Gastrointestinal: Negative  Endocrine: Negative  Genitourinary: Negative  Musculoskeletal: Positive for arthralgias, back pain and neck pain  Skin: Negative  Allergic/Immunologic: Negative  Neurological: Positive for numbness (b/l feet)  Hematological: Negative  Psychiatric/Behavioral: The patient is nervous/anxious  Depression       I have personally reviewed the ROS entered by MA and agree as documented  Objective:      /68 (BP Location: Left arm, Patient Position: Sitting, Cuff Size: Standard)   Pulse 66   Temp 98 1 °F (36 7 °C) (Tympanic)   Ht 6' 4" (1 93 m)   Wt 102 kg (225 lb)   BMI 27 39 kg/m²          Physical Exam  Vitals signs and nursing note reviewed  Constitutional:       Appearance: He is well-developed  HENT:      Head: Normocephalic and atraumatic  Neck:      Musculoskeletal: Normal range of motion and neck supple  Cardiovascular:      Rate and Rhythm: Normal rate and regular rhythm  Pulses:           Dorsalis pedis pulses are detected w/ Doppler on the left side  Posterior tibial pulses are detected w/ Doppler on the left side  Comments: Biphasic signals in the foot  Pulmonary:      Effort: Pulmonary effort is normal    Abdominal:      Palpations: Abdomen is soft  Musculoskeletal: Normal range of motion  Skin:     General: Skin is warm and dry        Capillary Refill: Capillary refill takes less than 2 seconds  Comments: Left 5th metatarsal resection site with dry flaky scab around the incision, nylon sutures intact, incision slightly , no erythema or drainage   Neurological:      General: No focal deficit present  Mental Status: He is alert and oriented to person, place, and time  Psychiatric:         Mood and Affect: Mood normal          Behavior: Behavior normal          Thought Content:  Thought content normal          Judgment: Judgment normal

## 2021-01-19 NOTE — PATIENT INSTRUCTIONS
PVD (peripheral vascular disease) ([facility] [address] )  PAD with diabetic foot ulcer s/p left 5th metatarsal resection 12/21 with revision of resection site due to persistent osteomyelitis on 12/31  He had unreliable PORTER/distal waveforms in setting of DMII on most recent arterial duplex but biphasic/triphasic waveforms were noted throughout on duplex imaging  Adequate bleeding was also noted in the OR during surgery      Presents for post-operative follow-up  He is doing well and was just discharged from rehab yesterday  He states he was followed by a podiatrist while at the rehab facility for local wound care      The incision is not fully healed  There is slight dehiscence but nylon sutures remain intact  No erythema or drainage  He has follow-up scheduled with podiatry tomorrow         -We discussed the pathophysiology of arterial occlusive disease, available treatment options and indications for treatment   -Continue medical optimization  Currently on ASA  Goal hgb A1c<7 0, LDL<70, BP <130/80  -Continue offloading until healed  Follow-up with podiatry tomorrow   -If any concerns for poor wound healing per podiatry,he may need to return earlier to schedule a left lower extremity angiogram to assess for any significant arterial disease that could be optimized for wound healing   -Follow-up in 1 month to assess for wound healing         Diabetic ulcer of left foot associated with type 2 diabetes mellitus, 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)  DMII with left diabetic foot ulcer  Goal hgbA1c < 7 0  On insulin  Recommend close follow-up with PCP to continue to optimize BS for wound healing   Last BS on most recent labwork appear to be well controlled

## 2021-01-19 NOTE — ASSESSMENT & PLAN NOTE
DMII with left diabetic foot ulcer  Goal hgbA1c < 7 0  On insulin  Recommend close follow-up with PCP to continue to optimize BS for wound healing  Last BS on most recent labwork appear to be well controlled      Lab Results   Component Value Date    HGBA1C 7 7 (H) 01/11/2021

## 2021-01-26 ENCOUNTER — TELEPHONE (OUTPATIENT)
Dept: VASCULAR SURGERY | Facility: CLINIC | Age: 76
End: 2021-01-26

## 2021-01-26 NOTE — TELEPHONE ENCOUNTER
Patient called to see if Dr Faiza Panchal could do surgery on his foot per his podiatrist I told patient he would have to see Dr Jonathon Linder and so we set up an appointment for 1-28-21 at [facility] with her   SHANTAF

## 2021-01-27 ENCOUNTER — TELEPHONE (OUTPATIENT)
Dept: VASCULAR SURGERY | Facility: CLINIC | Age: 76
End: 2021-01-27

## 2021-01-28 ENCOUNTER — OFFICE VISIT (OUTPATIENT)
Dept: VASCULAR SURGERY | Facility: CLINIC | Age: 76
End: 2021-01-28
Payer: MEDICARE

## 2021-01-28 ENCOUNTER — OFFICE VISIT (OUTPATIENT)
Dept: INTERNAL MEDICINE CLINIC | Facility: CLINIC | Age: 76
End: 2021-01-28
Payer: MEDICARE

## 2021-01-28 VITALS
RESPIRATION RATE: 12 BRPM | TEMPERATURE: 97.6 F | DIASTOLIC BLOOD PRESSURE: 56 MMHG | SYSTOLIC BLOOD PRESSURE: 102 MMHG | HEART RATE: 80 BPM

## 2021-01-28 VITALS
TEMPERATURE: 97.4 F | HEIGHT: 76 IN | SYSTOLIC BLOOD PRESSURE: 132 MMHG | WEIGHT: 225 LBS | HEART RATE: 59 BPM | BODY MASS INDEX: 27.4 KG/M2 | DIASTOLIC BLOOD PRESSURE: 76 MMHG

## 2021-01-28 DIAGNOSIS — L97.426 DIABETIC ULCER OF LEFT MIDFOOT ASSOCIATED WITH TYPE 2 DIABETES MELLITUS, WITH BONE INVOLVEMENT WITHOUT EVIDENCE OF NECROSIS (HCC): ICD-10-CM

## 2021-01-28 DIAGNOSIS — Z79.4 TYPE 2 DIABETES MELLITUS WITH STAGE 3 CHRONIC KIDNEY DISEASE, WITH LONG-TERM CURRENT USE OF INSULIN, UNSPECIFIED WHETHER STAGE 3A OR 3B CKD (HCC): Primary | ICD-10-CM

## 2021-01-28 DIAGNOSIS — I25.10 CORONARY ARTERY DISEASE INVOLVING NATIVE CORONARY ARTERY OF NATIVE HEART WITHOUT ANGINA PECTORIS: ICD-10-CM

## 2021-01-28 DIAGNOSIS — C67.9 MALIGNANT NEOPLASM OF URINARY BLADDER, UNSPECIFIED SITE (HCC): ICD-10-CM

## 2021-01-28 DIAGNOSIS — E11.621 DIABETIC ULCER OF LEFT MIDFOOT ASSOCIATED WITH TYPE 2 DIABETES MELLITUS, WITH BONE INVOLVEMENT WITHOUT EVIDENCE OF NECROSIS (HCC): ICD-10-CM

## 2021-01-28 DIAGNOSIS — E11.22 TYPE 2 DIABETES MELLITUS WITH STAGE 3 CHRONIC KIDNEY DISEASE, WITH LONG-TERM CURRENT USE OF INSULIN, UNSPECIFIED WHETHER STAGE 3A OR 3B CKD (HCC): Primary | ICD-10-CM

## 2021-01-28 DIAGNOSIS — I73.9 PVD (PERIPHERAL VASCULAR DISEASE) (HCC): ICD-10-CM

## 2021-01-28 DIAGNOSIS — N18.30 TYPE 2 DIABETES MELLITUS WITH STAGE 3 CHRONIC KIDNEY DISEASE, WITH LONG-TERM CURRENT USE OF INSULIN, UNSPECIFIED WHETHER STAGE 3A OR 3B CKD (HCC): Primary | ICD-10-CM

## 2021-01-28 DIAGNOSIS — N18.4 CKD (CHRONIC KIDNEY DISEASE) STAGE 4, GFR 15-29 ML/MIN (HCC): ICD-10-CM

## 2021-01-28 DIAGNOSIS — E78.2 MIXED HYPERLIPIDEMIA: ICD-10-CM

## 2021-01-28 DIAGNOSIS — D63.8 ANEMIA OF CHRONIC DISEASE: ICD-10-CM

## 2021-01-28 DIAGNOSIS — I20.8 STABLE ANGINA PECTORIS (HCC): ICD-10-CM

## 2021-01-28 DIAGNOSIS — I12.9 HYPERTENSION WITH RENAL DISEASE: ICD-10-CM

## 2021-01-28 DIAGNOSIS — I73.9 PAD (PERIPHERAL ARTERY DISEASE) (HCC): Primary | ICD-10-CM

## 2021-01-28 PROCEDURE — 99495 TRANSJ CARE MGMT MOD F2F 14D: CPT | Performed by: INTERNAL MEDICINE

## 2021-01-28 PROCEDURE — 99213 OFFICE O/P EST LOW 20 MIN: CPT | Performed by: SURGERY

## 2021-01-28 RX ORDER — MIRABEGRON 50 MG/1
TABLET, FILM COATED, EXTENDED RELEASE ORAL DAILY
COMMUNITY
End: 2021-05-07 | Stop reason: SDUPTHER

## 2021-01-28 RX ORDER — GABAPENTIN 600 MG/1
300 TABLET ORAL 3 TIMES DAILY
COMMUNITY
End: 2021-07-23 | Stop reason: SDUPTHER

## 2021-01-28 RX ORDER — OXYCODONE HYDROCHLORIDE 5 MG/1
5 TABLET ORAL EVERY 6 HOURS PRN
COMMUNITY
End: 2021-02-04

## 2021-01-28 RX ORDER — ACETAMINOPHEN 325 MG/1
650 TABLET ORAL EVERY 6 HOURS PRN
COMMUNITY

## 2021-01-28 NOTE — PATIENT INSTRUCTIONS
Go to schedule for the COVID vaccine today  Call us with the medication you taking for lowering cholesterol

## 2021-01-28 NOTE — PROGRESS NOTES
Assessment/Plan:    Diabetic ulcer of left foot associated with type 2 diabetes mellitus, 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 [facility])    Lab Results   Component Value Date    HGBA1C 7 7 (H) 01/11/2021     S/p left 5th ray amputation with revision with suboptimal postop healing  Seen recently by podiatry  Will plan to proceed with left lower extremity angiogram to optimize wound healing potential  He noted increased swelling in the foot and calf over the last day as well     -We discussed the pathophysiology of arterial occlusive disease, available treatment options and indications for treatment   -Continue medical optimization  Currently on ASA  Intolerant of statins  Goal hgb A1c<7 0, LDL<70, BP <130/80  -Plan for left lower extremity angiogram with possible intervention  All risks and benefits of the procedure including bleeding, infection, injury to surrounding structures, distal embolization, vessel injury, need for further operative procedures, complications related to bleeding were discussed with the patient and informed consent was obtained   -History of CKD stage 4, will obtain nephrology clearance/recommendations for hydration for CTA       Diagnoses and all orders for this visit:    PAD (peripheral artery disease) ([facility] [address] )  -     IR lower extremity angiogram; Future  -     Ambulatory referral to Nephrology; Future  -     CBC, NO DIFFERENTIAL/PLATELET; Future  -     Basic metabolic panel;  Future    PVD (peripheral vascular disease) (HCC)    Diabetic ulcer of left midfoot associated with type 2 diabetes mellitus, 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 ([facility] )    Other orders  -     Notify Physician if PT/INR greater than 1 8 and/or Creatinine Clearance (GFR)  is less than 60  ml/abeba/1 73sq m; Standing  -     Height and Weight Upon Arrival; Standing  -     Nursing communication Apply snapless gown prior to procedure; Standing  -     Have Patient Void On Call to Procedure Room; Standing  - Insert and Maintain IV; Standing        I have spent 15 minutes with Patient  today in which greater than 50% of this time was spent in counseling/coordination of care regarding Intructions for management, Importance of tx compliance and Impressions  Operative Scheduling Information:    Hospital:  Any IR/endo suite    Physician:  Scott Mack    Surgery: Left lower extremity angiogram with possible intervention, CO2 with limited contrast    Urgency:  Standard    Level:  Level 2: Outpatients to be scheduled for surgery with time dependent medical necessity within 2 weeks    Case Length:  Normal    Post-op Bed:  Outpatient    OR Table:  IR    Equipment Needs:  None    Medication Instructions:  None    Hydration:  Per nephrology      Subjective:      Patient ID: Rachael Foss is a 66 y o  male  Pt is here today to discuss the healing of his left foot  Pt is s/p left 5th metatarsel resection by Dr Lois Benitez DPM done 12/31/2020 and s/o Left foot ray resection done 12/21/2020  Pt had a DASH done 12/21/2020  Pt is taking ASA 81 mg daily  He is a former smoker  HPI  Mr Aurea Mendez is a 66yo male with HTN, DMII, CKD, CAD, bladder cancer, left diabetic foot wound s/p left 5th metatarsal resection and revision for osteomyelitis who presents for follow-up  He had seen podiatry recently who also appreciated delayed healing since surgery  She is also recommending intervention with lower extremity angiogram to optimize wound healing  He denies any fevers, chills, erythema, or drainage but has noticed increased swelling of the foot and leg over the last day or so  He denies any associated pain  The following portions of the patient's history were reviewed and updated as appropriate: allergies, current medications, past family history, past medical history, past social history, past surgical history and problem list     Review of Systems   Constitutional: Negative  HENT: Negative  Eyes: Negative  Respiratory: Negative  Cardiovascular: Positive for leg swelling (left foot)  Gastrointestinal: Negative  Endocrine: Negative  Genitourinary: Negative  Musculoskeletal: Positive for arthralgias and gait problem  Skin: Positive for wound  Allergic/Immunologic: Negative  Neurological: Negative for dizziness, tremors, seizures, syncope, facial asymmetry, speech difficulty, weakness, light-headedness, numbness and headaches  Hematological: Negative  Psychiatric/Behavioral: Negative  I have personally reviewed the ROS entered by MA and agree as documented  Objective:      /76 (BP Location: Right arm, Patient Position: Sitting, Cuff Size: Standard)   Pulse 59   Temp (!) 97 4 °F (36 3 °C) (Tympanic)   Ht 6' 4" (1 93 m)   Wt 102 kg (225 lb)   BMI 27 39 kg/m²          Physical Exam  Vitals signs and nursing note reviewed  Constitutional:       Appearance: He is well-developed  HENT:      Head: Normocephalic and atraumatic  Neck:      Musculoskeletal: Normal range of motion and neck supple  Cardiovascular:      Rate and Rhythm: Normal rate and regular rhythm  Pulses:           Femoral pulses are 2+ on the right side and 2+ on the left side  Popliteal pulses are 2+ on the right side and 2+ on the left side  Dorsalis pedis pulses are 1+ on the right side and detected w/ Doppler on the left side  Posterior tibial pulses are 1+ on the right side and detected w/ Doppler on the left side  Pulmonary:      Effort: Pulmonary effort is normal    Abdominal:      Palpations: Abdomen is soft  Musculoskeletal: Normal range of motion  Left lower leg: Edema present  Skin:     General: Skin is warm and dry  Capillary Refill: Capillary refill takes less than 2 seconds  Comments: Dry eschar and slightly dehisced skin edges along left 5th ray amputation site   Neurological:      General: No focal deficit present        Mental Status: He is alert and oriented to person, place, and time  Psychiatric:         Mood and Affect: Mood normal          Behavior: Behavior normal          Thought Content:  Thought content normal          Judgment: Judgment normal

## 2021-01-28 NOTE — PROGRESS NOTES
Assessment/Plan:  No change in medication or plan will try to schedule him for the COVID-19 vaccine if is available back in 6 weeks for follow-up give us the name of the cholesterol-lowering medication that was added by Cardiology    Admitted to [facility] on January 10 with angina coronary artery disease  Diabetes  Hypertension  Peripheral vascular disease  Hyperlipidemia  Chronic kidney disease    Parents to he was a [facility] for rehab during the exercise bike and developed some dizziness lightheadedness and chest pain I went to [facility] for further evaluation  At that time the EKG showed sinus bradycardia no acute changes  Troponins were negative  D-dimer showed 2 09  Chest x-ray showed no acute disease  Status post cardiac catheterization in October 2019 status post 3 drug-eluting stent and 2 angioplasty    He is on aspirin for life and was on Plavix until March of 2020 no longer on Plavix  Hemoglobin A1c 8 3  Lung scan showed no evidence of pulmonary embolism    He was discharged on a medication cardiology his cholesterol not a statin will give us the medication when he gets home    He was admitted to [address] [place] [address] from December 18 to January 6 osteomyelitis of the left 5th metatarsal wound surgical resection of infected bone A1c was 7 4 sed rate 42 creatinine 2 47 white count [id number] he had lower extremity arterial Doppler but vascular and they felt he was okay to proceed with surgery pathology of proximal margin bone showed acute osteomyelitis    [facility] was [facility] to [facility]      Injury was in rehab physical therapy until he developed chest pain had went to [facility] (since 12/28/2020)     Date and time call was made  1/19/2021  3:24 PM    Patient was hospitialized at  Other (comment)        Comment  [facility]    Date of Admission  01/12/21    Date of discharge  01/18/21    Diagnosis  Subacute osteomyelitis of left foot     Disposition  Home Were the patients medications reviewed and updated  Yes    Current Symptoms  None      TCM Call (since 12/28/2020)     Should patient be enrolled in anticoag monitoring? No    Scheduled for follow up? Yes    Not clinically warranted  Discharged to [facility]    Did you obtain your prescribed medications  Yes    Do you need help managing your prescriptions or medications  No    Is transportation to your appointment needed  No    I have advised the patient to call PCP with any new or worsening symptoms  Mj Dixon MA          No problem-specific Assessment & Plan notes found for this encounter  Diagnoses and all orders for this visit:    Type 2 diabetes mellitus with stage 3 chronic kidney disease, with long-term current use of insulin, unspecified whether stage 3a or 3b CKD ([facility] )    Diabetic ulcer of left midfoot associated with type 2 diabetes mellitus, 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)    Stable angina pectoris (HCC)    PVD (peripheral vascular disease) ([facility] )    Hypertension with renal disease    Coronary artery disease involving native coronary artery of native heart without angina pectoris    Malignant neoplasm of urinary bladder, unspecified site (HCC)    CKD (chronic kidney disease) stage 4, GFR 15-29 ml/min (HCC)    Mixed hyperlipidemia    Anemia of chronic disease          Subjective:      Patient ID: Nava Manuel is a 66 y o  male  No chief complaint on file  Current Outpatient Medications:     ALPRAZolam (XANAX) 0 25 mg tablet, At twice a day as needed for anxiety, Disp: 30 tablet, Rfl: 0    aspirin 81 mg chewable tablet, Chew 1 tablet (81 mg total) daily, Disp: 30 tablet, Rfl: 0    Azelastine HCl 0 15 % SOLN, Inhale 1 spray 2 (two) times a day, Disp: 30 mL, Rfl: 3    bismuth subsalicylate (PEPTO BISMOL) 262 MG chewable tablet, Chew 524 mg daily as needed for indigestion  , Disp: , Rfl:     calcitriol (ROCALTROL) 0 25 mcg capsule, , Disp: , Rfl:    Cholecalciferol (Vitamin D3) 1 25 MG (17510 UT) CAPS, TAKE 1 CAPSULE BY MOUTH EVERY 30 DAYS, Disp: 12 capsule, Rfl: 0    docusate sodium (COLACE) 100 mg capsule, Take 1 capsule (100 mg total) by mouth 2 (two) times a day, Disp: 10 capsule, Rfl: 0    fluticasone (FLONASE) 50 mcg/act nasal spray, One spray in each nostril twice a day, Disp: 1 Bottle, Rfl: 3    furosemide (LASIX) 20 mg tablet, Take 1 tablet every other day or as needed for edema - instruction change 6/24/20, Disp: 90 tablet, Rfl: 1    gabapentin (NEURONTIN) 300 mg capsule, Take 2 capsules (600 mg total) by mouth daily, Disp: 60 capsule, Rfl: 0    insulin lispro (HumaLOG) 100 units/mL injection, Inject 1-5 Units under the skin 4 (four) times a day (before meals and at bedtime) (Patient not taking: Reported on 1/19/2021), Disp: 6 mL, Rfl: 0    Insulin Pen Needle 31G X 8 MM MISC, Inject 3 times a day, Disp: 100 each, Rfl: 2    isosorbide mononitrate (IMDUR) 30 mg 24 hr tablet, Take 3 tablets (90 mg total) by mouth daily, Disp: 90 tablet, Rfl: 0    Lantus SoloStar 100 units/mL injection pen, 27 units qhs, Disp: 10 pen, Rfl: 1    latanoprost (XALATAN) 0 005 % ophthalmic solution, Administer 1 drop to both eyes daily at bedtime, Disp: , Rfl:     metoprolol succinate (TOPROL-XL) 100 mg 24 hr tablet, Take 1 tablet (100 mg total) by mouth daily, Disp: 30 tablet, Rfl: 0    multivitamin (THERAGRAN) TABS, Take 1 tablet by mouth daily  , Disp: , Rfl:     NovoLOG FlexPen 100 units/mL SOPN, 10 units with each meal (Patient taking differently: 10-12 units with each meal), Disp: 10 pen, Rfl: 1    pantoprazole (PROTONIX) 40 mg tablet, Take 1 tablet (40 mg total) by mouth daily in the early morning, Disp: 30 tablet, Rfl: 0    sertraline (ZOLOFT) 50 mg tablet, Take 1 tablet (50 mg total) by mouth daily, Disp: 30 tablet, Rfl: 3    tamsulosin (FLOMAX) 0 4 mg, Take 1 capsule (0 4 mg total) by mouth daily at bedtime, Disp: 30 capsule, Rfl: 0    HPI    The following portions of the patient's history were reviewed and updated as appropriate: allergies, current medications, past family history, past medical history, past social history, past surgical history and problem list     Review of Systems   Constitutional: Negative  Negative for activity change, appetite change, fatigue, fever and unexpected weight change  HENT: Negative for congestion, ear pain, hearing loss, mouth sores, postnasal drip, rhinorrhea, sore throat, trouble swallowing and voice change  Eyes: Negative for pain, redness and visual disturbance  Respiratory: Negative for cough, chest tightness, shortness of breath and wheezing  Cardiovascular: Negative for chest pain, palpitations and leg swelling  Gastrointestinal: Negative for abdominal distention, abdominal pain, blood in stool, constipation, diarrhea and nausea  Endocrine: Negative for cold intolerance, heat intolerance, polydipsia, polyphagia and polyuria  Genitourinary: Negative for difficulty urinating, dysuria, flank pain, frequency, hematuria and urgency  Musculoskeletal: Negative for arthralgias, back pain, gait problem, joint swelling and myalgias  Skin: Negative for color change and pallor  Neurological: Negative for dizziness, tremors, seizures, syncope, weakness, numbness and headaches  Hematological: Negative for adenopathy  Does not bruise/bleed easily  Psychiatric/Behavioral: Negative  Negative for sleep disturbance  The patient is not nervous/anxious  Objective: There were no vitals taken for this visit  Physical Exam  Vitals signs and nursing note reviewed  Constitutional:       Appearance: Normal appearance  He is well-developed  HENT:      Head: Normocephalic  Right Ear: External ear normal       Left Ear: External ear normal       Nose: Nose normal       Mouth/Throat:      Pharynx: No oropharyngeal exudate     Eyes:      Conjunctiva/sclera: Conjunctivae normal       Pupils: Pupils are equal, round, and reactive to light  Neck:      Musculoskeletal: Normal range of motion and neck supple  Thyroid: No thyromegaly  Cardiovascular:      Rate and Rhythm: Normal rate and regular rhythm  Heart sounds: Normal heart sounds  No murmur  No friction rub  No gallop  Comments: S1-S2 regular rhythm  Extremities no edema or calf tenderness  Pulmonary:      Effort: Pulmonary effort is normal  No respiratory distress  Breath sounds: Normal breath sounds  No wheezing or rales  Comments: Lungs are clear no wheezing rales or rhonchi  Abdominal:      General: Bowel sounds are normal  There is no distension  Palpations: Abdomen is soft  There is no mass  Tenderness: There is no abdominal tenderness  There is no guarding or rebound  Musculoskeletal: Normal range of motion  Lymphadenopathy:      Cervical: No cervical adenopathy  Skin:     General: Skin is warm and dry  Neurological:      Mental Status: He is alert and oriented to person, place, and time     Psychiatric:         Behavior: Behavior normal          Judgment: Judgment normal

## 2021-01-28 NOTE — LETTER
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 with suboptimal postop healing  Seen recently by podiatry  Will plan to proceed with left lower extremity angiogram to optimize wound healing potential  He noted increased swelling in the foot and calf over the last day as well      -We discussed the pathophysiology of arterial occlusive disease, available treatment options and indications for treatment   -Continue medical optimization  Currently on ASA  Intolerant of statins  Goal hgb A1c<7 0, LDL<70, BP <130/80  -Plan for left lower extremity angiogram with possible intervention  All risks and benefits of the procedure including bleeding, infection, injury to surrounding structures, distal embolization, vessel injury, need for further operative procedures, complications related to bleeding were discussed with the patient and informed consent was obtained   -History of CKD stage 4, will obtain nephrology clearance/recommendations for hydration for CTA    If you have questions, please do not hesitate to call me  I look forward to following Deshawn iYp along with you           Sincerely,        Dakotah Oshea MD        CC: No Recipients

## 2021-01-28 NOTE — PATIENT INSTRUCTIONS
S/p left 5th ray amputation with revision with suboptimal postop healing  Seen recently by podiatry  Will plan to proceed with left lower extremity angiogram to optimize wound healing potential  He noted increased swelling in the foot and calf over the last day as well      -We discussed the pathophysiology of arterial occlusive disease, available treatment options and indications for treatment   -Continue medical optimization  Currently on ASA  Intolerant of statins  Goal hgb A1c<7 0, LDL<70, BP <130/80  -Plan for left lower extremity angiogram with possible intervention   All risks and benefits of the procedure including bleeding, infection, injury to surrounding structures, distal embolization, vessel injury, need for further operative procedures, complications related to bleeding were discussed with the patient and informed consent was obtained   -History of CKD stage 4, will obtain nephrology clearance/recommendations for hydration for CTA

## 2021-01-28 NOTE — ASSESSMENT & PLAN NOTE
Lab Results   Component Value Date    HGBA1C 7 7 (H) 01/11/2021     S/p left 5th ray amputation with revision with suboptimal postop healing  Seen recently by podiatry  Will plan to proceed with left lower extremity angiogram to optimize wound healing potential  He noted increased swelling in the foot and calf over the last day as well     -We discussed the pathophysiology of arterial occlusive disease, available treatment options and indications for treatment   -Continue medical optimization  Currently on ASA  Intolerant of statins  Goal hgb A1c<7 0, LDL<70, BP <130/80  -Plan for left lower extremity angiogram with possible intervention   All risks and benefits of the procedure including bleeding, infection, injury to surrounding structures, distal embolization, vessel injury, need for further operative procedures, complications related to bleeding were discussed with the patient and informed consent was obtained   -History of CKD stage 4, will obtain nephrology clearance/recommendations for hydration for CTA

## 2021-01-29 ENCOUNTER — TELEPHONE (OUTPATIENT)
Dept: VASCULAR SURGERY | Facility: CLINIC | Age: 76
End: 2021-01-29

## 2021-01-29 ENCOUNTER — PREP FOR PROCEDURE (OUTPATIENT)
Dept: VASCULAR SURGERY | Facility: CLINIC | Age: 76
End: 2021-01-29

## 2021-01-29 NOTE — TELEPHONE ENCOUNTER
Pt is aware we are waiting on hydration recommendations from Nephrology  Is patient requesting a call when authorization has been obtained? Patient did not request a call  Surgery Date: 2/8/21  Primary Surgeon: Klarissa Monterroso (NPI: [id number])  Assisting Surgeon: Not Applicable (N/A)  Facility: [facility] (Tax: [id number] / NPI: [id number])  Inpatient / Outpatient: Outpatient  Level: 2    Clearance Received: No clearance ordered  Consent Received: Yes, scanned into Epic on 2/1/21  Medication Hold / Last Dose: Not Applicable (N/A)  VQI Spreadsheet: Not Applicable (N/A)  IR Notified: Not Applicable (N/A)  Rep  Notified: Not Applicable (N/A)  Equipment Needs: Not Applicable (N/A)  Vas Lab Requested: Not Applicable (N/A)  Patient Contacted: 1/29/21    Diagnosis: I73 9  Procedure/ CPT Code(s): Angiogram // CPT: 24782, 94821 and 48105    For varicose vein related procedures, last LEVDR? Not Applicable (N/A)    Post Operative Date/ Time: To Be Determined (TBD)     S/w pt and scheduled him for his LLE agram w/possible intervention, CO2 with limited contrast on 2/8/21 in SLA/Hybrid with Dr Jeri Sandoval  He is aware NPO after midnight on 2/7/21  He is having mobile unit come to his home this Monday for his blood work

## 2021-01-29 NOTE — TELEPHONE ENCOUNTER
Check out staff:   REMINDER: Under Reason For Call, comments MUST be formatted as:   (Surgeon's Initials) / (Procedure)    PHYSICIAN / HOSPITAL: [facility] (NPI: [id number]) / Any Facility     PROCEDURE: Left lower extremity angiogram with possible intervention, CO2 with limited contrast    LEVEL: 2    REP: No    ASSISTANT SURGEON: No    ALLERGIES: Atorvastatin, Simvastatin, Statins, Insulin lispro, Medical tape, and Other    INSTRUCTIONS GIVEN: AGRAM INSTRUCTIONS    BLOOD THINNERS / MED HOLD: Patient is not taking any blood thinners  HYDRATION REQUIRED: Patient does not require hydration  DIALYSIS: Patient is not on dialysis  *Please ROUTE this encounter to [facility] Surgery Coordinator   AND   Send COMPLETE CONSENT to Vascular Surgery Schedulers e-mail group*    I certify that patient has signed, printed, timed, and dated their surgery consent  I certify that BOTH sides of the completed surgery consent have been scanned into the patient's Epic chart by myself on 1/29/2021  *Please ROUTE this encounter to [facility] Clearance Pool   AND   Send CLEARANCE FORM(S) to Vascular Nursing e-mail group*    Patient does not require any pre operative clearance  As per Dr Aline Jaramillo patient needs Hydration  recommendation from his nephrologist Dr Konrad Najera from [address] [place] [address] Specialist [phone] which I called and left a VM

## 2021-02-03 NOTE — TELEPHONE ENCOUNTER
See notes from 1/29/21 from [address] Specialists for renal recommendations  Patient needs to be admitted for overnight hydration

## 2021-02-04 ENCOUNTER — ANESTHESIA EVENT (OUTPATIENT)
Dept: PERIOP | Facility: HOSPITAL | Age: 76
DRG: 271 | End: 2021-02-04
Payer: MEDICARE

## 2021-02-04 ENCOUNTER — APPOINTMENT (OUTPATIENT)
Dept: LAB | Facility: CLINIC | Age: 76
End: 2021-02-04
Payer: MEDICARE

## 2021-02-04 DIAGNOSIS — E11.40 TYPE 2 DIABETES MELLITUS WITH DIABETIC NEUROPATHY, WITH LONG-TERM CURRENT USE OF INSULIN (HCC): ICD-10-CM

## 2021-02-04 DIAGNOSIS — Z79.4 TYPE 2 DIABETES MELLITUS WITH DIABETIC NEUROPATHY, WITH LONG-TERM CURRENT USE OF INSULIN (HCC): ICD-10-CM

## 2021-02-04 DIAGNOSIS — I73.9 PAD (PERIPHERAL ARTERY DISEASE) (HCC): ICD-10-CM

## 2021-02-04 DIAGNOSIS — E78.5 HYPERLIPIDEMIA, UNSPECIFIED HYPERLIPIDEMIA TYPE: ICD-10-CM

## 2021-02-04 LAB
ALBUMIN SERPL BCP-MCNC: 3.6 G/DL (ref 3.5–5)
ALP SERPL-CCNC: 122 U/L (ref 46–116)
ALT SERPL W P-5'-P-CCNC: 47 U/L (ref 12–78)
ANION GAP SERPL CALCULATED.3IONS-SCNC: 7 MMOL/L (ref 4–13)
AST SERPL W P-5'-P-CCNC: 24 U/L (ref 5–45)
BASOPHILS # BLD AUTO: 0.04 THOUSANDS/ΜL (ref 0–0.1)
BASOPHILS NFR BLD AUTO: 0 % (ref 0–1)
BILIRUB SERPL-MCNC: 0.4 MG/DL (ref 0.2–1)
BUN SERPL-MCNC: 50 MG/DL (ref 5–25)
CALCIUM SERPL-MCNC: 9.4 MG/DL (ref 8.3–10.1)
CHLORIDE SERPL-SCNC: 109 MMOL/L (ref 100–108)
CHOLEST SERPL-MCNC: 131 MG/DL (ref 50–200)
CO2 SERPL-SCNC: 26 MMOL/L (ref 21–32)
CREAT SERPL-MCNC: 2.45 MG/DL (ref 0.6–1.3)
EOSINOPHIL # BLD AUTO: 0.3 THOUSAND/ΜL (ref 0–0.61)
EOSINOPHIL NFR BLD AUTO: 3 % (ref 0–6)
ERYTHROCYTE [DISTWIDTH] IN BLOOD BY AUTOMATED COUNT: 13.3 % (ref 11.6–15.1)
EST. AVERAGE GLUCOSE BLD GHB EST-MCNC: 154 MG/DL
GFR SERPL CREATININE-BSD FRML MDRD: 28 ML/MIN/1.73SQ M
GLUCOSE P FAST SERPL-MCNC: 105 MG/DL (ref 65–99)
HBA1C MFR BLD: 7 %
HCT VFR BLD AUTO: 32.6 % (ref 36.5–49.3)
HDLC SERPL-MCNC: 35 MG/DL
HGB BLD-MCNC: 10.4 G/DL (ref 12–17)
IMM GRANULOCYTES # BLD AUTO: 0.03 THOUSAND/UL (ref 0–0.2)
IMM GRANULOCYTES NFR BLD AUTO: 0 % (ref 0–2)
LDLC SERPL CALC-MCNC: 72 MG/DL (ref 0–100)
LYMPHOCYTES # BLD AUTO: 2.55 THOUSANDS/ΜL (ref 0.6–4.47)
LYMPHOCYTES NFR BLD AUTO: 28 % (ref 14–44)
MCH RBC QN AUTO: 30.1 PG (ref 26.8–34.3)
MCHC RBC AUTO-ENTMCNC: 31.9 G/DL (ref 31.4–37.4)
MCV RBC AUTO: 95 FL (ref 82–98)
MONOCYTES # BLD AUTO: 0.65 THOUSAND/ΜL (ref 0.17–1.22)
MONOCYTES NFR BLD AUTO: 7 % (ref 4–12)
NEUTROPHILS # BLD AUTO: 5.54 THOUSANDS/ΜL (ref 1.85–7.62)
NEUTS SEG NFR BLD AUTO: 62 % (ref 43–75)
NRBC BLD AUTO-RTO: 0 /100 WBCS
PLATELET # BLD AUTO: 175 THOUSANDS/UL (ref 149–390)
PMV BLD AUTO: 11.3 FL (ref 8.9–12.7)
POTASSIUM SERPL-SCNC: 4.9 MMOL/L (ref 3.5–5.3)
PROT SERPL-MCNC: 7.4 G/DL (ref 6.4–8.2)
RBC # BLD AUTO: 3.45 MILLION/UL (ref 3.88–5.62)
SODIUM SERPL-SCNC: 142 MMOL/L (ref 136–145)
TRIGL SERPL-MCNC: 121 MG/DL
TSH SERPL DL<=0.05 MIU/L-ACNC: 2.27 UIU/ML (ref 0.36–3.74)
WBC # BLD AUTO: 9.11 THOUSAND/UL (ref 4.31–10.16)

## 2021-02-04 PROCEDURE — 80061 LIPID PANEL: CPT

## 2021-02-04 PROCEDURE — 80053 COMPREHEN METABOLIC PANEL: CPT

## 2021-02-04 PROCEDURE — 85025 COMPLETE CBC W/AUTO DIFF WBC: CPT

## 2021-02-04 PROCEDURE — 36415 COLL VENOUS BLD VENIPUNCTURE: CPT

## 2021-02-04 PROCEDURE — 84443 ASSAY THYROID STIM HORMONE: CPT

## 2021-02-04 PROCEDURE — 83036 HEMOGLOBIN GLYCOSYLATED A1C: CPT

## 2021-02-05 ENCOUNTER — TELEPHONE (OUTPATIENT)
Dept: INTERNAL MEDICINE CLINIC | Facility: CLINIC | Age: 76
End: 2021-02-05

## 2021-02-05 DIAGNOSIS — Z79.4 TYPE 2 DIABETES MELLITUS WITH COMPLICATION, WITH LONG-TERM CURRENT USE OF INSULIN (HCC): ICD-10-CM

## 2021-02-05 DIAGNOSIS — E11.8 TYPE 2 DIABETES MELLITUS WITH COMPLICATION (HCC): ICD-10-CM

## 2021-02-05 DIAGNOSIS — E11.8 TYPE 2 DIABETES MELLITUS WITH COMPLICATION, WITH LONG-TERM CURRENT USE OF INSULIN (HCC): ICD-10-CM

## 2021-02-05 DIAGNOSIS — I73.9 PERIPHERAL VASCULAR DISEASE, UNSPECIFIED (HCC): Primary | ICD-10-CM

## 2021-02-05 RX ORDER — INSULIN ASPART 100 [IU]/ML
INJECTION, SOLUTION INTRAVENOUS; SUBCUTANEOUS
Qty: 5 PEN | Refills: 1 | Status: SHIPPED | OUTPATIENT
Start: 2021-02-05

## 2021-02-05 RX ORDER — INSULIN GLARGINE 100 [IU]/ML
INJECTION, SOLUTION SUBCUTANEOUS
Qty: 10 PEN | Refills: 1
Start: 2021-02-05 | End: 2021-06-24 | Stop reason: SDUPTHER

## 2021-02-05 RX ORDER — INSULIN ASPART 100 [IU]/ML
15 INJECTION, SOLUTION INTRAVENOUS; SUBCUTANEOUS
Qty: 5 PEN | Refills: 1 | Status: SHIPPED | OUTPATIENT
Start: 2021-02-05 | End: 2021-02-05 | Stop reason: SDUPTHER

## 2021-02-05 NOTE — TELEPHONE ENCOUNTER
Magdalene from [address] [facility] [address] [facility] is calling to state they went to the patients home yesterday and he was not there  They spoke with him today and he is refusing any type of therapy  They will be discharging as of today

## 2021-02-05 NOTE — TELEPHONE ENCOUNTER
ADT 21 done and pt is aware he will be admitted the day prior and will be called when a bed is available

## 2021-02-07 ENCOUNTER — HOSPITAL ENCOUNTER (INPATIENT)
Facility: HOSPITAL | Age: 76
LOS: 10 days | Discharge: HOME | DRG: 271 | End: 2021-02-17
Attending: SURGERY | Admitting: SURGERY
Payer: MEDICARE

## 2021-02-07 DIAGNOSIS — R31.0 GROSS HEMATURIA: ICD-10-CM

## 2021-02-07 DIAGNOSIS — Z89.422 LEFT TOE AMPUTEE (HCC): ICD-10-CM

## 2021-02-07 DIAGNOSIS — I73.9 PAD (PERIPHERAL ARTERY DISEASE) (HCC): ICD-10-CM

## 2021-02-07 DIAGNOSIS — Z01.818 PREOP EXAMINATION: ICD-10-CM

## 2021-02-07 DIAGNOSIS — L97.426 DIABETIC ULCER OF LEFT MIDFOOT ASSOCIATED WITH TYPE 2 DIABETES MELLITUS, WITH BONE INVOLVEMENT WITHOUT EVIDENCE OF NECROSIS (HCC): ICD-10-CM

## 2021-02-07 DIAGNOSIS — E78.2 MIXED HYPERLIPIDEMIA: ICD-10-CM

## 2021-02-07 DIAGNOSIS — I12.9 HYPERTENSION WITH RENAL DISEASE: ICD-10-CM

## 2021-02-07 DIAGNOSIS — E11.621 DIABETIC ULCER OF LEFT MIDFOOT ASSOCIATED WITH TYPE 2 DIABETES MELLITUS, WITH BONE INVOLVEMENT WITHOUT EVIDENCE OF NECROSIS (HCC): ICD-10-CM

## 2021-02-07 DIAGNOSIS — I73.9 PVD (PERIPHERAL VASCULAR DISEASE) (HCC): Primary | ICD-10-CM

## 2021-02-07 DIAGNOSIS — D09.0 CARCINOMA IN SITU OF BLADDER: ICD-10-CM

## 2021-02-07 DIAGNOSIS — N18.30 STAGE 3 CHRONIC KIDNEY DISEASE, UNSPECIFIED WHETHER STAGE 3A OR 3B CKD (HCC): ICD-10-CM

## 2021-02-07 LAB
GLUCOSE SERPL-MCNC: 154 MG/DL (ref 65–140)
GLUCOSE SERPL-MCNC: 94 MG/DL (ref 65–140)
PLATELET # BLD AUTO: 203 THOUSANDS/UL (ref 149–390)
PMV BLD AUTO: 10.7 FL (ref 8.9–12.7)

## 2021-02-07 PROCEDURE — 85049 AUTOMATED PLATELET COUNT: CPT | Performed by: SURGERY

## 2021-02-07 PROCEDURE — G0379 DIRECT REFER HOSPITAL OBSERV: HCPCS

## 2021-02-07 PROCEDURE — 99024 POSTOP FOLLOW-UP VISIT: CPT | Performed by: SURGERY

## 2021-02-07 PROCEDURE — 82948 REAGENT STRIP/BLOOD GLUCOSE: CPT

## 2021-02-07 RX ORDER — ALPRAZOLAM 0.25 MG/1
0.25 TABLET ORAL 2 TIMES DAILY PRN
Status: DISCONTINUED | OUTPATIENT
Start: 2021-02-07 | End: 2021-02-17 | Stop reason: HOSPADM

## 2021-02-07 RX ORDER — TAMSULOSIN HYDROCHLORIDE 0.4 MG/1
0.4 CAPSULE ORAL
Status: DISCONTINUED | OUTPATIENT
Start: 2021-02-07 | End: 2021-02-17 | Stop reason: HOSPADM

## 2021-02-07 RX ORDER — LATANOPROST 50 UG/ML
1 SOLUTION/ DROPS OPHTHALMIC
Status: DISCONTINUED | OUTPATIENT
Start: 2021-02-07 | End: 2021-02-17 | Stop reason: HOSPADM

## 2021-02-07 RX ORDER — ISOSORBIDE MONONITRATE 30 MG/1
30 TABLET, EXTENDED RELEASE ORAL DAILY
Status: DISCONTINUED | OUTPATIENT
Start: 2021-02-07 | End: 2021-02-17 | Stop reason: HOSPADM

## 2021-02-07 RX ORDER — PANTOPRAZOLE SODIUM 40 MG/1
40 TABLET, DELAYED RELEASE ORAL
Status: DISCONTINUED | OUTPATIENT
Start: 2021-02-08 | End: 2021-02-17 | Stop reason: HOSPADM

## 2021-02-07 RX ORDER — METOPROLOL SUCCINATE 50 MG/1
100 TABLET, EXTENDED RELEASE ORAL DAILY
Status: DISCONTINUED | OUTPATIENT
Start: 2021-02-07 | End: 2021-02-17 | Stop reason: HOSPADM

## 2021-02-07 RX ORDER — ACETAMINOPHEN 325 MG/1
650 TABLET ORAL EVERY 6 HOURS PRN
Status: DISCONTINUED | OUTPATIENT
Start: 2021-02-07 | End: 2021-02-17 | Stop reason: HOSPADM

## 2021-02-07 RX ORDER — ASPIRIN 81 MG/1
81 TABLET, CHEWABLE ORAL DAILY
Status: DISCONTINUED | OUTPATIENT
Start: 2021-02-07 | End: 2021-02-15

## 2021-02-07 RX ORDER — SODIUM CHLORIDE 9 MG/ML
100 INJECTION, SOLUTION INTRAVENOUS CONTINUOUS
Status: DISPENSED | OUTPATIENT
Start: 2021-02-07 | End: 2021-02-09

## 2021-02-07 RX ORDER — FLUTICASONE PROPIONATE 50 MCG
1 SPRAY, SUSPENSION (ML) NASAL 2 TIMES DAILY
Status: DISCONTINUED | OUTPATIENT
Start: 2021-02-07 | End: 2021-02-17 | Stop reason: HOSPADM

## 2021-02-07 RX ORDER — INSULIN GLARGINE 100 [IU]/ML
10 INJECTION, SOLUTION SUBCUTANEOUS
Status: DISCONTINUED | OUTPATIENT
Start: 2021-02-07 | End: 2021-02-17 | Stop reason: HOSPADM

## 2021-02-07 RX ORDER — DOCUSATE SODIUM 100 MG/1
100 CAPSULE, LIQUID FILLED ORAL 2 TIMES DAILY
Status: DISCONTINUED | OUTPATIENT
Start: 2021-02-07 | End: 2021-02-17 | Stop reason: HOSPADM

## 2021-02-07 RX ORDER — ACETYLCYSTEINE 200 MG/ML
1200 SOLUTION ORAL; RESPIRATORY (INHALATION) 2 TIMES DAILY
Status: DISCONTINUED | OUTPATIENT
Start: 2021-02-07 | End: 2021-02-08

## 2021-02-07 RX ORDER — OXYCODONE HYDROCHLORIDE 5 MG/1
5 TABLET ORAL EVERY 4 HOURS PRN
Status: DISCONTINUED | OUTPATIENT
Start: 2021-02-07 | End: 2021-02-17 | Stop reason: HOSPADM

## 2021-02-07 RX ORDER — ZOLPIDEM TARTRATE 5 MG/1
5 TABLET ORAL
Status: DISCONTINUED | OUTPATIENT
Start: 2021-02-07 | End: 2021-02-17 | Stop reason: HOSPADM

## 2021-02-07 RX ORDER — HEPARIN SODIUM 5000 [USP'U]/ML
5000 INJECTION, SOLUTION INTRAVENOUS; SUBCUTANEOUS EVERY 8 HOURS SCHEDULED
Status: DISCONTINUED | OUTPATIENT
Start: 2021-02-07 | End: 2021-02-15

## 2021-02-07 RX ORDER — GABAPENTIN 300 MG/1
600 CAPSULE ORAL DAILY
Status: DISCONTINUED | OUTPATIENT
Start: 2021-02-07 | End: 2021-02-17 | Stop reason: HOSPADM

## 2021-02-07 RX ADMIN — SODIUM CHLORIDE 105 ML/HR: 0.9 INJECTION, SOLUTION INTRAVENOUS at 15:30

## 2021-02-07 RX ADMIN — DOCUSATE SODIUM 100 MG: 100 CAPSULE, LIQUID FILLED ORAL at 17:21

## 2021-02-07 RX ADMIN — ACETAMINOPHEN 650 MG: 325 TABLET ORAL at 17:29

## 2021-02-07 RX ADMIN — HEPARIN SODIUM 5000 UNITS: 5000 INJECTION INTRAVENOUS; SUBCUTANEOUS at 16:26

## 2021-02-07 RX ADMIN — INSULIN GLARGINE 10 UNITS: 100 INJECTION, SOLUTION SUBCUTANEOUS at 23:26

## 2021-02-07 RX ADMIN — LATANOPROST 1 DROP: 50 SOLUTION OPHTHALMIC at 21:03

## 2021-02-07 RX ADMIN — HEPARIN SODIUM 5000 UNITS: 5000 INJECTION INTRAVENOUS; SUBCUTANEOUS at 21:03

## 2021-02-07 RX ADMIN — OXYCODONE HYDROCHLORIDE 5 MG: 5 TABLET ORAL at 20:32

## 2021-02-07 RX ADMIN — TAMSULOSIN HYDROCHLORIDE 0.4 MG: 0.4 CAPSULE ORAL at 21:03

## 2021-02-07 RX ADMIN — ACETYLCYSTEINE 1200 MG: 200 SOLUTION ORAL; RESPIRATORY (INHALATION) at 17:21

## 2021-02-07 NOTE — PLAN OF CARE
Problem: Potential for Falls  Goal: Patient will remain free of falls  Description: INTERVENTIONS:  - Assess patient frequently for physical needs  -  Identify cognitive and physical deficits and behaviors that affect risk of falls  -  [place] fall precautions as indicated by assessment   - Educate patient/family on patient safety including physical limitations  - Instruct patient to call for assistance with activity based on assessment  - Modify environment to reduce risk of injury  - Consider OT/PT consult to assist with strengthening/mobility  Outcome: Progressing     Problem: NEUROSENSORY - ADULT  Goal: Achieves stable or improved neurological status  Description: INTERVENTIONS  - Monitor and report changes in neurological status  - Monitor vital signs such as temperature, blood pressure, glucose, and any other labs ordered   - Initiate measures to prevent increased intracranial pressure  - Monitor for seizure activity and implement precautions if appropriate      Outcome: Progressing  Goal: Remains free of injury related to seizures activity  Description: INTERVENTIONS  - Maintain airway, patient safety  and administer oxygen as ordered  - Monitor patient for seizure activity, document and report duration and description of seizure to physician/advanced practitioner  - If seizure occurs,  ensure patient safety during seizure  - Reorient patient post seizure  - Seizure pads on all 4 side rails  - Instruct patient/family to notify RN of any seizure activity including if an aura is experienced  - Instruct patient/family to call for assistance with activity based on nursing assessment  - Administer anti-seizure medications if ordered    Outcome: Progressing  Goal: Achieves maximal functionality and self care  Description: INTERVENTIONS  - Monitor swallowing and airway patency with patient fatigue and changes in neurological status  - Encourage and assist patient to increase activity and self care     - Encourage visually impaired, hearing impaired and aphasic patients to use assistive/communication devices  Outcome: Progressing     Problem: CARDIOVASCULAR - ADULT  Goal: Maintains optimal cardiac output and hemodynamic stability  Description: INTERVENTIONS:  - Monitor I/O, vital signs and rhythm  - Monitor for S/S and trends of decreased cardiac output  - Administer and titrate ordered vasoactive medications to optimize hemodynamic stability  - Assess quality of pulses, skin color and temperature  - Assess for signs of decreased coronary artery perfusion  - Instruct patient to report change in severity of symptoms  Outcome: Progressing  Goal: Absence of cardiac dysrhythmias or at baseline rhythm  Description: INTERVENTIONS:  - Continuous cardiac monitoring, vital signs, obtain 12 lead EKG if ordered  - Administer antiarrhythmic and heart rate control medications as ordered  - Monitor electrolytes and administer replacement therapy as ordered  Outcome: Progressing     Problem: RESPIRATORY - ADULT  Goal: Achieves optimal ventilation and oxygenation  Description: INTERVENTIONS:  - Assess for changes in respiratory status  - Assess for changes in mentation and behavior  - Position to facilitate oxygenation and minimize respiratory effort  - Oxygen administered by appropriate delivery if ordered  - Initiate smoking cessation education as indicated  - Encourage broncho-pulmonary hygiene including cough, deep breathe, Incentive Spirometry  - Assess the need for suctioning and aspirate as needed  - Assess and instruct to report SOB or any respiratory difficulty  - Respiratory Therapy support as indicated  Outcome: Progressing     Problem: GENITOURINARY - ADULT  Goal: Maintains or returns to baseline urinary function  Description: INTERVENTIONS:  - Assess urinary function  - Encourage oral fluids to ensure adequate hydration if ordered  - Administer IV fluids as ordered to ensure adequate hydration  - Administer ordered medications as needed  - Offer frequent toileting  - Follow urinary retention protocol if ordered  Outcome: Progressing  Goal: Absence of urinary retention  Description: INTERVENTIONS:  - Assess patients ability to void and empty bladder  - Monitor I/O  - Bladder scan as needed  - Discuss with physician/AP medications to alleviate retention as needed  - Discuss catheterization for long term situations as appropriate  Outcome: Progressing     Problem: METABOLIC, FLUID AND ELECTROLYTES - ADULT  Goal: Electrolytes maintained within normal limits  Description: INTERVENTIONS:  - Monitor labs and assess patient for signs and symptoms of electrolyte imbalances  - Administer electrolyte replacement as ordered  - Monitor response to electrolyte replacements, including repeat lab results as appropriate  - Instruct patient on fluid and nutrition as appropriate  Outcome: Progressing  Goal: Fluid balance maintained  Description: INTERVENTIONS:  - Monitor labs   - Monitor I/O and WT  - Instruct patient on fluid and nutrition as appropriate  - Assess for signs & symptoms of volume excess or deficit  Outcome: Progressing  Goal: Glucose maintained within target range  Description: INTERVENTIONS:  - Monitor Blood Glucose as ordered  - Assess for signs and symptoms of hyperglycemia and hypoglycemia  - Administer ordered medications to maintain glucose within target range  - Assess nutritional intake and initiate nutrition service referral as needed  Outcome: Progressing     Problem: SKIN/TISSUE INTEGRITY - ADULT  Goal: Skin integrity remains intact  Description: INTERVENTIONS  - Identify patients at risk for skin breakdown  - Assess and monitor skin integrity  - Assess and monitor nutrition and hydration status  - Monitor labs (i e  albumin)  - Assess for incontinence   - Turn and reposition patient  - Assist with mobility/ambulation  - Relieve pressure over bony prominences  - Avoid friction and shearing  - Provide appropriate hygiene as needed including keeping skin clean and dry  - Evaluate need for skin moisturizer/barrier cream  - Collaborate with interdisciplinary team (i e  Nutrition, Rehabilitation, etc )   - Patient/family teaching  Outcome: Progressing  Goal: Incision(s), wounds(s) or drain site(s) healing without S/S of infection  Description: INTERVENTIONS  - Assess and document risk factors for skin impairment   - Assess and document dressing, incision, wound bed, drain sites and surrounding tissue  - Consider nutrition services referral as needed  - Oral mucous membranes remain intact  - Provide patient/ family education  Outcome: Progressing  Goal: Oral mucous membranes remain intact  Description: INTERVENTIONS  - Assess oral mucosa and hygiene practices  - Implement preventative oral hygiene regimen  - Implement oral medicated treatments as ordered  - Initiate Nutrition services referral as needed  Outcome: Progressing     Problem: MUSCULOSKELETAL - ADULT  Goal: Maintain or return mobility to safest level of function  Description: INTERVENTIONS:  - Assess patient's ability to carry out ADLs; assess patient's baseline for ADL function and identify physical deficits which impact ability to perform ADLs (bathing, care of mouth/teeth, toileting, grooming, dressing, etc )  - Assess/evaluate cause of self-care deficits   - Assess range of motion  - Assess patient's mobility  - Assess patient's need for assistive devices and provide as appropriate  - Encourage maximum independence but intervene and supervise when necessary  - Involve family in performance of ADLs  - Assess for home care needs following discharge   - Consider OT consult to assist with ADL evaluation and planning for discharge  - Provide patient education as appropriate  Outcome: Progressing  Goal: Maintain proper alignment of affected body part  Description: INTERVENTIONS:  - Support, maintain and protect limb and body alignment  - Provide patient/ family with appropriate education  Outcome: Progressing

## 2021-02-07 NOTE — H&P
H&P Exam - Vascular Surgery   Aly Gannon 66 y o  male MRN: [id number]  Unit/Bed#: E5 -01 Encounter: [id number]    Assessment/Plan     Assessment:  67 yo M with delayed wound healing s/p left 5 th metatarsal resection, LLE angiogram scheduled on 2/8    Plan:  Preadmission for hydration  Nephrology consulted  IVF   NPO after midnight  Blood sugar management    History of Present Illness     HPI:  Aly Gannon is a 66 y o  male with history of CAD s/p cardiac stent, HLP, DM and stage 3 CKD presented for pre-admission for hydration for LLE angiogram tomorrow  He is s/p left 5th metatarsal resection and revision for left diabetic foot wound with delayed healing  Review of Systems   All other systems reviewed and are negative  Historical Information   Past Medical History:   Diagnosis Date    Anemia     Last assessed: 9/28/17    Anxiety     Arteriosclerotic cardiovascular disease     Last assessed: 9/28/17    Arthritis     Bladder cancer ([facility] [address] )     bladder- had BCG treatments    Chronic kidney disease     CKD (chronic kidney disease) stage 4, GFR 15-29 ml/min ([facility])     Colon polyp     Coronary artery disease     7 stents    Depression     Diabetes mellitus ([facility])     IDDM    GERD (gastroesophageal reflux disease)     Glaucoma     History of fusion of cervical spine     Hyperlipidemia     Hypertension     Insomnia     Last assessed: 11/14/12    Loss of hearing     has hearing aids but usually does not wear them    Other seasonal allergic rhinitis     Last assessed: 2/10/16    PAD (peripheral artery disease) ([facility])     Shortness of breath     on exertion    Spinal stenosis of lumbar region     Transient cerebral ischemia     Uses walker     w/c for longer distances     Past Surgical History:   Procedure Laterality Date    CARDIAC SURGERY      Cath stent placement  Last assessed: 3/9/17   Interventional Catheterization    CHOLECYSTECTOMY      COLONOSCOPY      CYSTOSCOPY Diagnostic w/biopsy  Quan Cisse  Last assessed: 12/1/14    CYSTOURETHROSCOPY      w/cautery  Quan Tanna    GASTRIC BYPASS      For morbid obesity w/Shaji-en-Y   Resolved: 11/17/09    INCISION AND DRAINAGE OF WOUND Right 2/26/2017    Procedure: INCISION AND DRAINAGE (I&D) EXTREMITY WITH APPLICATION OF GRAFT JACKET;  Surgeon: Dayanna Forrest DPM;  [facility];  Service:    [address] Right 4/25/2017    Procedure: INCISION AND DRAINAGE (I&D) EXTREMITY, APPLICATION OF GRAFT;  Surgeon: Dayanna Forrest DPM;  [facility];  Service:     IR BIOPSY OTHER  7/2/2020    IR TUNNELED CENTRAL LINE PLACEMENT  12/24/2020    JOINT REPLACEMENT      christofer knees replaced    AK AMPUTATION METATARSAL+TOE,SINGLE Left 12/21/2020    Procedure: RAY RESECTION FOOT;  Surgeon: Jaquelin Chavez DPM;  [facility];  Service: Podiatry    AK AMPUTATION METATARSAL+TOE,SINGLE Left 12/31/2020    Procedure: 5TH MET RESECTION;  Surgeon: Jaquelin Chavez DPM;  [facility];  Service: Podiatry    AK CYSTOURETHROSCOPY,BIOPSY N/A 8/16/2016    Procedure: Pete Norwoodek;  Surgeon: Macey Asif MD;  [facility];  Service: Urology    AK CYSTOURETHROSCOPY,FULGUR <0 5 CM LESN N/A 11/19/2020    Procedure: CYSTO W/BIOPSIES, transurethral prostate bx;  Surgeon: Darryl Maynard MD;  [facility];  Service: Urology    ROTATOR CUFF REPAIR      SMALL INTESTINE SURGERY      Surgery Shaji-en-Y    SPINAL FUSION      lumbar and cervical fusions    VAC DRESSING APPLICATION Right 3/70/5889    Procedure: APPLICATION VAC DRESSING;  Surgeon: Dayanna Forrest DPM;  [facility];  Service:      Social History   Social History     Substance and Sexual Activity   Alcohol Use Yes    Frequency: 2-3 times a week    Drinks per session: 1 or 2    Binge frequency: Never    Comment: beer / liquor     Social History     Substance and Sexual Activity   Drug Use Not Currently    Types: Marijuana    Comment: quit 2019 had medical marijuana     Social History     Tobacco Use   Smoking Status Former Smoker    Quit date: 1970    Years since quittin 1   Smokeless Tobacco Never Used     E-Cigarette/Vaping    E-Cigarette Use Never User      E-Cigarette/Vaping Substances    Nicotine No     THC No     CBD No     Flavoring No     Other No     Unknown No      Family History: non-contributory    Meds/Allergies   all current active meds have been reviewed  Allergies   Allergen Reactions    Atorvastatin Hives, Itching and Rash    Simvastatin Rash and Edema     "ALL STATINS"    Statins Itching    Insulin Lispro Swelling and Edema    Medical Tape      rash to electrodes    Other Itching, Rash and Other (See Comments)     rash to electrodes and adhesives       Objective   Vitals: Blood pressure 133/70, pulse 70, temperature 97 5 °F (36 4 °C), temperature source Temporal, resp  rate 18, height 6' 4" (1 93 m), weight 105 kg (232 lb 9 4 oz), SpO2 99 %  ,Body mass index is 28 31 kg/m²  No intake or output data in the 24 hours ending 21 1452  Invasive Devices     None                 Physical Exam  Vitals signs and nursing note reviewed  HENT:      Head: Normocephalic  Right Ear: External ear normal       Left Ear: External ear normal       Nose: Nose normal       Mouth/Throat:      Mouth: Mucous membranes are dry  Pharynx: Oropharynx is clear  Neck:      Musculoskeletal: Normal range of motion and neck supple  Cardiovascular:      Rate and Rhythm: Normal rate and regular rhythm  Pulses: Normal pulses  Heart sounds: Normal heart sounds  Pulmonary:      Effort: Pulmonary effort is normal       Breath sounds: Normal breath sounds  Abdominal:      General: Abdomen is flat  Bowel sounds are normal       Palpations: Abdomen is soft  Musculoskeletal: Normal range of motion  Skin:     General: Skin is warm  Findings: Lesion present  Neurological:      General: No focal deficit present  Mental Status: He is alert and oriented to person, place, and time  Mental status is at baseline  Psychiatric:         Mood and Affect: Mood normal          Behavior: Behavior normal          Thought Content: Thought content normal          Judgment: Judgment normal      Pulses: femoral 2+  b/l               Popliteal 2+ b/l               DP: 1+ on R, only doppler signal on left               PT: 1+ on R, only doppler signal on right    Lab Results: I have personally reviewed pertinent reports  Imaging: I have personally reviewed pertinent reports  EKG, Pathology, and Other Studies: I have personally reviewed pertinent reports  Code Status: Level 1 - Full Code  Advance Directive and Living Will:      Power of :    POLST:      Counseling / Coordination of Care  Counseling/Coordination of Care: Total floor / unit time spent today 30 minutes  Greater than 50% of total time was spent with the patient and / or family counseling and / or coordination of care  A description of the counseling / coordination of care: Osmar Field

## 2021-02-07 NOTE — TREATMENT PLAN
Renal    Notified by Vascular Surgery for need for angiogram  Patient has underlying CKD  Reviewed with Vascular  Delayed wound healing  Chronic Kidney Disease Stage IIIB/IV  --baseline 2 3-2 6 mg/dL  --Nephrologist: Dr Ezra Rivera ([address] [facility])   --SPEP/UPEP no monoclonal protein  --UPC 0 5  --diabetes, hypertension, ROSSY in past  --Normal Saline at 105 cc/hr pre and post angiogram  --Will also start Mucomyst  --EF 60%  --patient moderate to high risk for ROSSY given age, diabetes and CKD  Will try to minimize with volume primarily  --full consult to follow tomorrow

## 2021-02-07 NOTE — ANESTHESIA PREPROCEDURE EVALUATION
Procedure:  LEG angiogram, poss intervention, CO2 w/limited contrast (Left Groin)    Relevant Problems   CARDIO   (+) CAD (coronary artery disease)   (+) Hyperlipidemia   (+) Hypertension with renal disease   (+) Stable angina pectoris (HCC)      ENDO   (+) Secondary renal hyperparathyroidism (HCC)   (+) Type 2 diabetes mellitus, with long-term current use of insulin (HCC)      /RENAL   (+) CKD (chronic kidney disease) stage 3, GFR 30-59 ml/min (HCC)   (+) CKD (chronic kidney disease) stage 4, GFR 15-29 ml/min (HCC)      HEMATOLOGY   (+) Anemia of chronic disease   (+) Chronic anemia      MUSCULOSKELETAL   (+) Back pain   (+) Degeneration of lumbar intervertebral disc   (+) Primary osteoarthritis of left knee      NEURO/PSYCH   (+) Diabetic polyneuropathy associated with type 2 diabetes mellitus (HCC)   (+) Stable angina pectoris (HCC)        Physical Exam    Airway    Mallampati score: II  TM Distance: >3 FB  Neck ROM: full     Dental   No notable dental hx     Cardiovascular  Rhythm: regular, Rate: normal, Cardiovascular exam normal    Pulmonary  Pulmonary exam normal Breath sounds clear to auscultation,     Other Findings        Anesthesia Plan  ASA Score- 2     Anesthesia Type- general with ASA Monitors  Additional Monitors: arterial line  Airway Plan: ETT  Plan Factors-    Chart reviewed  EKG reviewed  Imaging results reviewed  Existing labs reviewed  Patient summary reviewed  Induction- intravenous  Postoperative Plan- Plan for postoperative opioid use  Informed Consent- Anesthetic plan and risks discussed with patient

## 2021-02-08 ENCOUNTER — APPOINTMENT (OUTPATIENT)
Dept: RADIOLOGY | Facility: HOSPITAL | Age: 76
DRG: 271 | End: 2021-02-08
Payer: MEDICARE

## 2021-02-08 ENCOUNTER — APPOINTMENT (INPATIENT)
Dept: RADIOLOGY | Facility: HOSPITAL | Age: 76
DRG: 271 | End: 2021-02-08
Payer: MEDICARE

## 2021-02-08 ENCOUNTER — ANESTHESIA (OUTPATIENT)
Dept: PERIOP | Facility: HOSPITAL | Age: 76
DRG: 271 | End: 2021-02-08
Payer: MEDICARE

## 2021-02-08 VITALS — HEART RATE: 58 BPM

## 2021-02-08 LAB
ANION GAP SERPL CALCULATED.3IONS-SCNC: 6 MMOL/L (ref 4–13)
ANION GAP SERPL CALCULATED.3IONS-SCNC: 9 MMOL/L (ref 4–13)
BUN SERPL-MCNC: 35 MG/DL (ref 5–25)
BUN SERPL-MCNC: 38 MG/DL (ref 5–25)
CALCIUM SERPL-MCNC: 7.9 MG/DL (ref 8.3–10.1)
CALCIUM SERPL-MCNC: 8.3 MG/DL (ref 8.3–10.1)
CHLORIDE SERPL-SCNC: 106 MMOL/L (ref 100–108)
CHLORIDE SERPL-SCNC: 106 MMOL/L (ref 100–108)
CO2 SERPL-SCNC: 24 MMOL/L (ref 21–32)
CO2 SERPL-SCNC: 26 MMOL/L (ref 21–32)
CREAT SERPL-MCNC: 2.02 MG/DL (ref 0.6–1.3)
CREAT SERPL-MCNC: 2.03 MG/DL (ref 0.6–1.3)
ERYTHROCYTE [DISTWIDTH] IN BLOOD BY AUTOMATED COUNT: 13.2 % (ref 11.6–15.1)
GFR SERPL CREATININE-BSD FRML MDRD: 35 ML/MIN/1.73SQ M
GFR SERPL CREATININE-BSD FRML MDRD: 36 ML/MIN/1.73SQ M
GLUCOSE P FAST SERPL-MCNC: 104 MG/DL (ref 65–99)
GLUCOSE SERPL-MCNC: 104 MG/DL (ref 65–140)
GLUCOSE SERPL-MCNC: 104 MG/DL (ref 65–140)
GLUCOSE SERPL-MCNC: 105 MG/DL (ref 65–140)
GLUCOSE SERPL-MCNC: 248 MG/DL (ref 65–140)
GLUCOSE SERPL-MCNC: 251 MG/DL (ref 65–140)
HCT VFR BLD AUTO: 28 % (ref 36.5–49.3)
HCT VFR BLD AUTO: 31.4 % (ref 36.5–49.3)
HGB BLD-MCNC: 10.4 G/DL (ref 12–17)
HGB BLD-MCNC: 9.2 G/DL (ref 12–17)
INR PPP: 1.06 (ref 0.84–1.19)
MCH RBC QN AUTO: 30.7 PG (ref 26.8–34.3)
MCHC RBC AUTO-ENTMCNC: 33.1 G/DL (ref 31.4–37.4)
MCV RBC AUTO: 93 FL (ref 82–98)
PLATELET # BLD AUTO: 164 THOUSANDS/UL (ref 149–390)
PMV BLD AUTO: 10.7 FL (ref 8.9–12.7)
POTASSIUM SERPL-SCNC: 4.5 MMOL/L (ref 3.5–5.3)
POTASSIUM SERPL-SCNC: 5.4 MMOL/L (ref 3.5–5.3)
PROTHROMBIN TIME: 13.6 SECONDS (ref 11.6–14.5)
RBC # BLD AUTO: 3.39 MILLION/UL (ref 3.88–5.62)
SODIUM SERPL-SCNC: 138 MMOL/L (ref 136–145)
SODIUM SERPL-SCNC: 139 MMOL/L (ref 136–145)
WBC # BLD AUTO: 6.74 THOUSAND/UL (ref 4.31–10.16)

## 2021-02-08 PROCEDURE — 75710 ARTERY X-RAYS ARM/LEG: CPT | Performed by: SURGERY

## 2021-02-08 PROCEDURE — B41DYZZ FLUOROSCOPY OF AORTA AND BILATERAL LOWER EXTREMITY ARTERIES USING OTHER CONTRAST: ICD-10-PCS | Performed by: SURGERY

## 2021-02-08 PROCEDURE — 82948 REAGENT STRIP/BLOOD GLUCOSE: CPT

## 2021-02-08 PROCEDURE — C1769 GUIDE WIRE: HCPCS | Performed by: SURGERY

## 2021-02-08 PROCEDURE — 047S3ZZ DILATION OF LEFT POSTERIOR TIBIAL ARTERY, PERCUTANEOUS APPROACH: ICD-10-PCS | Performed by: SURGERY

## 2021-02-08 PROCEDURE — C1894 INTRO/SHEATH, NON-LASER: HCPCS | Performed by: SURGERY

## 2021-02-08 PROCEDURE — C1887 CATHETER, GUIDING: HCPCS | Performed by: SURGERY

## 2021-02-08 PROCEDURE — 99024 POSTOP FOLLOW-UP VISIT: CPT | Performed by: SURGERY

## 2021-02-08 PROCEDURE — 85018 HEMOGLOBIN: CPT | Performed by: SURGERY

## 2021-02-08 PROCEDURE — C1760 CLOSURE DEV, VASC: HCPCS | Performed by: SURGERY

## 2021-02-08 PROCEDURE — 37228 PR REVASCULARIZE TIBIAL/PERON ARTERY,ANGIOPLASTY INITIAL: CPT | Performed by: SURGERY

## 2021-02-08 PROCEDURE — 85027 COMPLETE CBC AUTOMATED: CPT | Performed by: SURGERY

## 2021-02-08 PROCEDURE — 76000 FLUOROSCOPY <1 HR PHYS/QHP: CPT

## 2021-02-08 PROCEDURE — C1725 CATH, TRANSLUMIN NON-LASER: HCPCS | Performed by: SURGERY

## 2021-02-08 PROCEDURE — 85610 PROTHROMBIN TIME: CPT | Performed by: SURGERY

## 2021-02-08 PROCEDURE — 73630 X-RAY EXAM OF FOOT: CPT

## 2021-02-08 PROCEDURE — 99223 1ST HOSP IP/OBS HIGH 75: CPT | Performed by: INTERNAL MEDICINE

## 2021-02-08 PROCEDURE — G9198 NO ORDER FOR CEPH NO REASON: HCPCS | Performed by: SURGERY

## 2021-02-08 PROCEDURE — 80048 BASIC METABOLIC PNL TOTAL CA: CPT | Performed by: SURGERY

## 2021-02-08 PROCEDURE — 85014 HEMATOCRIT: CPT | Performed by: SURGERY

## 2021-02-08 PROCEDURE — 76937 US GUIDE VASCULAR ACCESS: CPT | Performed by: SURGERY

## 2021-02-08 PROCEDURE — NC001 PR NO CHARGE: Performed by: SURGERY

## 2021-02-08 PROCEDURE — 75625 CONTRAST EXAM ABDOMINL AORTA: CPT | Performed by: SURGERY

## 2021-02-08 DEVICE — MYNX CONTROL VCD 5F
Type: IMPLANTABLE DEVICE | Site: ARTERIAL | Status: FUNCTIONAL
Brand: MYNX CONTROL

## 2021-02-08 RX ORDER — EPHEDRINE SULFATE 50 MG/ML
INJECTION INTRAVENOUS AS NEEDED
Status: DISCONTINUED | OUTPATIENT
Start: 2021-02-08 | End: 2021-02-08

## 2021-02-08 RX ORDER — PROPOFOL 10 MG/ML
INJECTION, EMULSION INTRAVENOUS CONTINUOUS PRN
Status: DISCONTINUED | OUTPATIENT
Start: 2021-02-08 | End: 2021-02-08

## 2021-02-08 RX ORDER — ACETYLCYSTEINE 200 MG/ML
1200 SOLUTION ORAL; RESPIRATORY (INHALATION) 2 TIMES DAILY
Status: COMPLETED | OUTPATIENT
Start: 2021-02-08 | End: 2021-02-08

## 2021-02-08 RX ORDER — HEPARIN SODIUM 200 [USP'U]/100ML
INJECTION, SOLUTION INTRAVENOUS
Status: COMPLETED | OUTPATIENT
Start: 2021-02-08 | End: 2021-02-08

## 2021-02-08 RX ORDER — LIDOCAINE HYDROCHLORIDE 20 MG/ML
INJECTION, SOLUTION EPIDURAL; INFILTRATION; INTRACAUDAL; PERINEURAL AS NEEDED
Status: DISCONTINUED | OUTPATIENT
Start: 2021-02-08 | End: 2021-02-08

## 2021-02-08 RX ORDER — FENTANYL CITRATE/PF 50 MCG/ML
50 SYRINGE (ML) INJECTION
Status: DISCONTINUED | OUTPATIENT
Start: 2021-02-08 | End: 2021-02-08 | Stop reason: HOSPADM

## 2021-02-08 RX ORDER — LIDOCAINE HYDROCHLORIDE 10 MG/ML
INJECTION, SOLUTION EPIDURAL; INFILTRATION; INTRACAUDAL; PERINEURAL AS NEEDED
Status: DISCONTINUED | OUTPATIENT
Start: 2021-02-08 | End: 2021-02-08 | Stop reason: HOSPADM

## 2021-02-08 RX ORDER — ONDANSETRON 2 MG/ML
4 INJECTION INTRAMUSCULAR; INTRAVENOUS ONCE AS NEEDED
Status: DISCONTINUED | OUTPATIENT
Start: 2021-02-08 | End: 2021-02-08 | Stop reason: HOSPADM

## 2021-02-08 RX ORDER — HEPARIN SODIUM 1000 [USP'U]/ML
INJECTION, SOLUTION INTRAVENOUS; SUBCUTANEOUS AS NEEDED
Status: DISCONTINUED | OUTPATIENT
Start: 2021-02-08 | End: 2021-02-08

## 2021-02-08 RX ORDER — FENTANYL CITRATE 50 UG/ML
INJECTION, SOLUTION INTRAMUSCULAR; INTRAVENOUS AS NEEDED
Status: DISCONTINUED | OUTPATIENT
Start: 2021-02-08 | End: 2021-02-08

## 2021-02-08 RX ORDER — MIDAZOLAM HYDROCHLORIDE 2 MG/2ML
INJECTION, SOLUTION INTRAMUSCULAR; INTRAVENOUS AS NEEDED
Status: DISCONTINUED | OUTPATIENT
Start: 2021-02-08 | End: 2021-02-08

## 2021-02-08 RX ADMIN — ASPIRIN 81 MG: 81 TABLET, CHEWABLE ORAL at 13:00

## 2021-02-08 RX ADMIN — MIDAZOLAM 2 MG: 1 INJECTION INTRAMUSCULAR; INTRAVENOUS at 08:09

## 2021-02-08 RX ADMIN — SODIUM CHLORIDE 100 ML/HR: 0.9 INJECTION, SOLUTION INTRAVENOUS at 15:26

## 2021-02-08 RX ADMIN — EPHEDRINE SULFATE 5 MG: 50 INJECTION, SOLUTION INTRAVENOUS at 08:54

## 2021-02-08 RX ADMIN — SERTRALINE HYDROCHLORIDE 50 MG: 50 TABLET ORAL at 12:56

## 2021-02-08 RX ADMIN — FENTANYL CITRATE 25 MCG: 50 INJECTION, SOLUTION INTRAMUSCULAR; INTRAVENOUS at 10:29

## 2021-02-08 RX ADMIN — INSULIN GLARGINE 10 UNITS: 100 INJECTION, SOLUTION SUBCUTANEOUS at 21:03

## 2021-02-08 RX ADMIN — METOPROLOL SUCCINATE 100 MG: 50 TABLET, EXTENDED RELEASE ORAL at 12:56

## 2021-02-08 RX ADMIN — LIDOCAINE HYDROCHLORIDE 100 MG: 20 INJECTION, SOLUTION EPIDURAL; INFILTRATION; INTRACAUDAL; PERINEURAL at 08:21

## 2021-02-08 RX ADMIN — ACETYLCYSTEINE 1200 MG: 200 SOLUTION ORAL; RESPIRATORY (INHALATION) at 20:50

## 2021-02-08 RX ADMIN — TAMSULOSIN HYDROCHLORIDE 0.4 MG: 0.4 CAPSULE ORAL at 21:25

## 2021-02-08 RX ADMIN — GABAPENTIN 600 MG: 300 CAPSULE ORAL at 12:54

## 2021-02-08 RX ADMIN — HEPARIN SODIUM 7000 UNITS: 1000 INJECTION INTRAVENOUS; SUBCUTANEOUS at 09:47

## 2021-02-08 RX ADMIN — INSULIN LISPRO 3 UNITS: 100 INJECTION, SOLUTION INTRAVENOUS; SUBCUTANEOUS at 16:31

## 2021-02-08 RX ADMIN — ACETYLCYSTEINE 1200 MG: 200 SOLUTION ORAL; RESPIRATORY (INHALATION) at 12:57

## 2021-02-08 RX ADMIN — SODIUM CHLORIDE: 0.9 INJECTION, SOLUTION INTRAVENOUS at 09:14

## 2021-02-08 RX ADMIN — LATANOPROST 1 DROP: 50 SOLUTION OPHTHALMIC at 21:04

## 2021-02-08 RX ADMIN — FENTANYL CITRATE 25 MCG: 50 INJECTION, SOLUTION INTRAMUSCULAR; INTRAVENOUS at 09:30

## 2021-02-08 RX ADMIN — ZOLPIDEM TARTRATE 5 MG: 5 TABLET, COATED ORAL at 00:01

## 2021-02-08 RX ADMIN — HEPARIN SODIUM 5000 UNITS: 5000 INJECTION INTRAVENOUS; SUBCUTANEOUS at 13:04

## 2021-02-08 RX ADMIN — PROPOFOL 50 MCG/KG/MIN: 10 INJECTION, EMULSION INTRAVENOUS at 08:21

## 2021-02-08 RX ADMIN — ISOSORBIDE MONONITRATE 30 MG: 30 TABLET, EXTENDED RELEASE ORAL at 12:55

## 2021-02-08 RX ADMIN — OXYCODONE HYDROCHLORIDE 5 MG: 5 TABLET ORAL at 19:55

## 2021-02-08 RX ADMIN — FENTANYL CITRATE 50 MCG: 50 INJECTION, SOLUTION INTRAMUSCULAR; INTRAVENOUS at 08:21

## 2021-02-08 NOTE — ANESTHESIA POSTPROCEDURE EVALUATION
Post-Op Assessment Note    CV Status:  Stable  Pain Score: 4    Pain management: adequate     Mental Status:  Alert and awake   Hydration Status:  Euvolemic   PONV Controlled:  Controlled   Airway Patency:  Patent      Post Op Vitals Reviewed: Yes      Staff: Anesthesiologist, CRNA         No complications documented      BP      Temp     Pulse     Resp      SpO2

## 2021-02-08 NOTE — TELEMEDICINE
INTERPROFESSIONAL (PHONE) CONSULTATION - Interventional Radiology  Vanita Corinth 66 y o  male MRN: [id number]  Unit/Bed#: E5 -01 Encounter: [id number]    IR has been consulted to evaluate the patient, determine the appropriate procedure, and whether or not a procedure can and should be performed regarding the care of Vanita Gaston  IP Consult to IR  Consult performed by: Yuri Rivera MD  Consult ordered by: Lulú Mendoza MD        02/08/21      Assessment/Recommendation:   78-year-old man with nonhealing left surgical wound  Underwent left leg arteriogram today with suboptimal result due to resistant focal posterior tibial stenosis  Will plan to repeat intervention with either left antegrade and/or retrograde access and consider atherectomy and further angioplasty  Discussed with Dr Sandoval Copper  Will tentatively schedule for Thursday afternoon  Nephrology has been consulted for perioperative management and optimization due to CKD  Will also need to check status of his new onset hematuria as we will need to fully anticoagulate him for any tibial intervention  Total time spent in review of data, discussion with requesting provider and rendering advice was 15 minutes  Thank you for allowing Interventional Radiology to participate in the care of Vanita Gaston  Please don't hesitate to call or TigerText us with any questions       Megan Apple MD

## 2021-02-08 NOTE — CONSULTS
Consultation    Nephrology   Aly Gannon 66 y o  male MRN: [id number]  Unit/Bed#: E5 -01 Encounter: [id number]    History of Present Illness   Physician Requesting Consult: Julita Yo MD  Reason for Consult : -  perioperative optimization to reduce incidence for acute kidney injury/CKD stage 4    ASSESSMENT/PLAN:   66 y o   male with pmh of hypertension, hyperlipidemia, arthritis, diabetes, CAD and CKD stage 4 who was admitted for poor healing of left 5th metatarsal resection site  Nephrology has been consulted for perioperative optimization to reduce incidence for acute kidney injury   CKD stage 4:  - patient at risk for acute kidney injury secondary to underlying comorbidities after exposure to contrast for angiogram on 02/08/2021 due to ROSSY  - After review of records it appears that the patient has a baseline Creatinine of 2 2-2 5 mg/dL  - most recent creatinine prior to admission on 02/04/2021 at 2 45 mg/dL  - patient's creatinine today is at 2 03 mg/dL, stable and below baseline  - continue IV fluids for now and DC at midnight  - complete last 2 doses of Mucomyst  - check BMP, magnesium, phosphorus in a m   - Clinically patient is not uremic and there is no acute indication for renal replacement therapy (dialysis)  - Optimize hemodynamic status to avoid delay in renal recovery  - Avoid nephrotoxins, adjust meds to appropriate GFR   - Strict I/O   - Daily weights  - Urinary retention protocol if patient does not have a Shirley  - Most likely has underlying CKD secondary to diabetic kidney disease plus hypertensive nephrosclerosis plus age-related nephron loss plus likely underlying renal vascular disease  - will need to set up patient for follow up with Nephrology as an outpatient post hospitalization   - as an outpatient for nephrology, patient follows up with [facility]     Blood pressure/hypertension:  - current medications:  Imdur 30 mg p o  Q day, Toprol- mg p o  Q day  - recommendations:  No changes for now  - Optimize hemodynamics   - Maintain MAP > 65mmHg  - Avoid BP fluctuations   H/H/anemia:  - most recent hemoglobin at 10 4 grams/deciliter  - maintain hemoglobin greater than 8 grams/deciliter  - likely has underlying anemia secondary to CKD     Acid-base electrolytes:  o Electrolyte:     Stable  o  Acid-base:    - Most recent bicarb at 24     Volume status:  o  DC IV fluids at midnight today     Proteinuria:   o Most likely secondary to diabetic kidney disease  o Most recent protein creatinine ratio 530 mg as of December 2020     Other medical problems:  o Diabetes:  Management per primary team   On insulin, most recent A1c of 7% as of February 2021  o Left lower extremity wound:  Management per primary team   Follow-up with vascular  Status post angiogram on 02/08/2021  o Hematuria:  Continue to monitor for now follow-up primary team       Thanks for the consult  Will continue to follow  Please call with questions/ concerns  Above-mentioned orders and Plan in terms of perioperative optimization was discussed with the team in [address] MD, FASN, 2/8/2021, 1:34 PM          HISTORY OF PRESENT ILLNESS:   Jaida Ambrose is a 66 y o   male with pmh of hypertension, hyperlipidemia, arthritis, diabetes, CAD and CKD stage 4 who was admitted for poor healing of left 5th metatarsal resection site  Nephrology has been consulted for perioperative optimization to reduce incidence for acute kidney injury  Patient was for angiogram today  Preliminary recommendations were given by Nephrology yesterday including hydration with IV fluids and minimization of IV contrast exposure during angiogram and possible use of CO2  Patient is status post angiogram on 02/08/2021  Records reveal patient has a baseline creatinine 2 2-2 5 mg/dL  As an outpatient follows up with [facility] for his nephrology care  Feels well has no complaints  No NSAID use  Had some hematuria noted post angiogram   History obtained from chart review and the patient    Inpatient consult to Nephrology  Consult performed by: Faiza Longo MD  Consult ordered by: Jose Manuel Wayne MD          Review of Systems   Constitutional: Negative for chills, fatigue and fever  HENT: Negative for congestion  Respiratory: Negative for cough, shortness of breath and wheezing  Cardiovascular: Negative for leg swelling  Gastrointestinal: Negative for abdominal pain, constipation, diarrhea, nausea and vomiting  Genitourinary: Positive for hematuria  Negative for dysuria  Musculoskeletal: Negative for back pain  Skin: Positive for wound  Negative for rash  Neurological: Negative for dizziness and headaches  Psychiatric/Behavioral: Negative for agitation and confusion  All other systems reviewed and are negative         Historical Information   Patient Active Problem List   Diagnosis    CAD (coronary artery disease)    Carcinoma in situ of bladder    Hypertension with renal disease    Hyperlipidemia    Presence of stent in coronary artery    Type 2 diabetes mellitus, with long-term current use of insulin ([facility])    CKD (chronic kidney disease) stage 3, GFR 30-59 ml/min ([facility])    Primary osteoarthritis of left knee    Malignant tumor of urinary bladder ([facility])    Cystitis due to intravesical BCG administration    Degeneration of lumbar intervertebral disc    Back pain    Arteriosclerosis of artery of extremity ([address] )    Stable angina pectoris ([facility])    Herpes zoster without complication    Localized edema    Superficial phlebitis    Preop examination    Acute cystitis without hematuria    Secondary renal hyperparathyroidism ([address] )    Bladder cancer ([address] )    Abnormal MRI, lumbar spine    Diabetic polyneuropathy associated with type 2 diabetes mellitus ([address] )    Dysuria    Diabetic ulcer of left foot associated with type 2 diabetes mellitus, 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 ([place] [id number] )    CKD (chronic kidney disease) stage 4, GFR 15-29 ml/min ([facility])    Chronic anemia    Anemia of chronic disease    Preoperative examination    PVD (peripheral vascular disease) ([place] [address] )    Left carotid bruit     Past Medical History:   Diagnosis Date    Anemia     Last assessed: 9/28/17    Anxiety     Arteriosclerotic cardiovascular disease     Last assessed: 9/28/17    Arthritis     Bladder cancer ([place] [address] )     bladder- had BCG treatments    Chronic kidney disease     CKD (chronic kidney disease) stage 4, GFR 15-29 ml/min ([facility])     Colon polyp     Coronary artery disease     7 stents    Depression     Diabetes mellitus ([facility])     IDDM    GERD (gastroesophageal reflux disease)     Glaucoma     History of fusion of cervical spine     Hyperlipidemia     Hypertension     Insomnia     Last assessed: 11/14/12    Loss of hearing     has hearing aids but usually does not wear them    Other seasonal allergic rhinitis     Last assessed: 2/10/16    PAD (peripheral artery disease) ([facility])     Shortness of breath     on exertion    Spinal stenosis of lumbar region     Transient cerebral ischemia     Uses walker     w/c for longer distances     Past Surgical History:   Procedure Laterality Date    CARDIAC SURGERY      Cath stent placement  Last assessed: 3/9/17  Interventional Catheterization    CHOLECYSTECTOMY      COLONOSCOPY      CYSTOSCOPY      Diagnostic w/biopsy  Feliz Chapman  Last assessed: 12/1/14    CYSTOURETHROSCOPY      w/cautery  Feliz Chapman    GASTRIC BYPASS      For morbid obesity w/Shaji-en-Y   Resolved: 11/17/09    INCISION AND DRAINAGE OF WOUND Right 2/26/2017    Procedure: INCISION AND DRAINAGE (I&D) EXTREMITY WITH APPLICATION OF GRAFT JACKET;  Surgeon: Kimmy Jaimes DPM;  [facility];  Service:     INCISION AND DRAINAGE OF WOUND Right 4/25/2017    Procedure: INCISION AND DRAINAGE (I&D) EXTREMITY, APPLICATION OF GRAFT;  Surgeon: Margene Fabry Theodore Miller DPM;  [facility];  Service:     IR BIOPSY OTHER  2020    IR TUNNELED CENTRAL LINE PLACEMENT  2020    JOINT REPLACEMENT      christofer knees replaced    WV AMPUTATION METATARSAL+TOE,SINGLE Left 2020    Procedure: RAY RESECTION FOOT;  Surgeon: Hubert Moulton DPM;  [facility];  Service: Podiatry    WV AMPUTATION METATARSAL+TOE,SINGLE Left 2020    Procedure: 5TH MET RESECTION;  Surgeon: Hubert Muolton DPM;  [facility];  Service: Podiatry    WV CYSTOURETHROSCOPY,BIOPSY N/A 2016    Procedure: Lynita Arthur;  Surgeon: Kimber Darling MD;  [facility];  Service: Urology    WV CYSTOURETHROSCOPY,FULGUR <0 5 CM LESN N/A 2020    Procedure: CYSTO W/BIOPSIES, transurethral prostate bx;  Surgeon: Jennifer Shetty MD;  [facility];  Service: Urology    ROTATOR CUFF REPAIR      SMALL INTESTINE SURGERY      Surgery Shaji-en-Y    SPINAL FUSION      lumbar and cervical fusions    VAC DRESSING APPLICATION Right 4794    Procedure: APPLICATION VAC DRESSING;  Surgeon: George Claros DPM;  [facility];  Service:      Social History   Social History     Substance and Sexual Activity   Alcohol Use Yes    Frequency: 2-3 times a week    Drinks per session: 1 or 2    Binge frequency: Never    Comment: beer / liquor     Social History     Substance and Sexual Activity   Drug Use Not Currently    Types: Marijuana    Comment: quit 2019 had medical marijuana     Social History     Tobacco Use   Smoking Status Former Smoker    Quit date: 1970    Years since quittin 1   Smokeless Tobacco Never Used     Family History   Problem Relation Age of Onset    Diabetes Mother     Heart disease Mother     Other Mother         High blood pressure    Heart disease Father     Diabetes Sister     Other Sister         High blood pressure    Kidney disease Sister     Heart disease Brother     Other Brother         High blood pressure       Meds/Allergies   current meds:   Current Facility-Administered Medications   Medication Dose Route Frequency    acetaminophen (TYLENOL) tablet 650 mg  650 mg Oral Q6H PRN    [MAR Hold] acetylcysteine (MUCOMYST) 200 mg/mL oral solution 1,200 mg  1,200 mg Oral BID    ALPRAZolam (XANAX) tablet 0 25 mg  0 25 mg Oral BID PRN    aspirin chewable tablet 81 mg  81 mg Oral Daily    bismuth subsalicylate (PEPTO BISMOL) oral suspension 524 mg  524 mg Oral Q6H PRN    docusate sodium (COLACE) capsule 100 mg  100 mg Oral BID    fluticasone (FLONASE) 50 mcg/act nasal spray 1 spray  1 spray Each Nare BID    gabapentin (NEURONTIN) capsule 600 mg  600 mg Oral Daily    heparin (porcine) subcutaneous injection 5,000 Units  5,000 Units Subcutaneous Q8H [address]    insulin glargine (LANTUS) subcutaneous injection 10 Units 0 1 mL  10 Units Subcutaneous HS    insulin lispro (HumaLOG) 100 units/mL subcutaneous injection 1-6 Units  1-6 Units Subcutaneous TID AC    isosorbide mononitrate (IMDUR) 24 hr tablet 30 mg  30 mg Oral Daily    latanoprost (XALATAN) 0 005 % ophthalmic solution 1 drop  1 drop Both Eyes HS    metoprolol succinate (TOPROL-XL) 24 hr tablet 100 mg  100 mg Oral Daily    oxyCODONE (ROXICODONE) IR tablet 5 mg  5 mg Oral Q4H PRN    pantoprazole (PROTONIX) EC tablet 40 mg  40 mg Oral Early Morning    sertraline (ZOLOFT) tablet 50 mg  50 mg Oral Daily    sodium chloride 0 9 % infusion  105 mL/hr Intravenous Continuous    tamsulosin (FLOMAX) capsule 0 4 mg  0 4 mg Oral HS    zolpidem (AMBIEN) tablet 5 mg  5 mg Oral HS PRN    and PTA meds:   Prior to Admission Medications   Prescriptions Last Dose Informant Patient Reported? Taking?    ALPRAZolam (XANAX) 0 25 mg tablet  Self No No   Sig: At twice a day as needed for anxiety   Azelastine HCl 0 15 % SOLN  Self No No   Sig: Inhale 1 spray 2 (two) times a day   Patient taking differently: Inhale 1 spray 2 (two) times a day as needed    Cholecalciferol (Vitamin D3) 1 25 MG (18930 UT) CAPS  Self No No   Sig: TAKE 1 CAPSULE BY MOUTH EVERY 30 DAYS   Insulin Pen Needle 31G X 8 MM MISC  Self No No   Sig: Inject 3 times a day   Lantus SoloStar 100 units/mL injection pen   No No   Sig: 15 units qhs   Mirabegron ER (Myrbetriq) 50 MG TB24  Self Yes No   Sig: Take by mouth daily    NovoLOG FlexPen 100 units/mL injection pen   No No   Sig: 10 units with each meal    acetaminophen (TYLENOL) 325 mg tablet  Self Yes No   Sig: Take 650 mg by mouth every 6 (six) hours as needed for mild pain   aspirin 81 mg chewable tablet  Self No No   Sig: Chew 1 tablet (81 mg total) daily   bismuth subsalicylate (PEPTO BISMOL) 262 MG chewable tablet  Self Yes No   Sig: Chew 524 mg daily as needed for indigestion  calcitriol (ROCALTROL) 0 25 mcg capsule  Self Yes No   Sig: Take by mouth daily    docusate sodium (COLACE) 100 mg capsule  Self No No   Sig: Take 1 capsule (100 mg total) by mouth 2 (two) times a day   fluticasone (FLONASE) 50 mcg/act nasal spray  Self No No   Sig: One spray in each nostril twice a day   furosemide (LASIX) 20 mg tablet  Self No No   Sig: Take 1 tablet every other day or as needed for edema - instruction change 6/24/20   gabapentin (NEURONTIN) 600 MG tablet  Self Yes No   Sig: Take 600 mg by mouth daily    isosorbide mononitrate (IMDUR) 30 mg 24 hr tablet  Self No No   Sig: Take 3 tablets (90 mg total) by mouth daily   Patient taking differently: Take 30 mg by mouth daily    latanoprost (XALATAN) 0 005 % ophthalmic solution  Self Yes No   Sig: Administer 1 drop to both eyes daily at bedtime   metoprolol succinate (TOPROL-XL) 100 mg 24 hr tablet  Self No No   Sig: Take 1 tablet (100 mg total) by mouth daily   multivitamin (THERAGRAN) TABS  Self Yes No   Sig: Take 1 tablet by mouth daily     pantoprazole (PROTONIX) 40 mg tablet  Self No No   Sig: Take 1 tablet (40 mg total) by mouth daily in the early morning   sertraline (ZOLOFT) 50 mg tablet  Self No No   Sig: Take 1 tablet (50 mg total) by mouth daily   tamsulosin (FLOMAX) 0 4 mg  Self No No   Sig: Take 1 capsule (0 4 mg total) by mouth daily at bedtime      Facility-Administered Medications: None       Scheduled Meds:  Current Facility-Administered Medications   Medication Dose Route Frequency Provider Last Rate    acetaminophen  650 mg Oral Q6H PRN MD Mauricio Kidd [facility] Hold] acetylcysteine  1,200 mg Oral BID Rich Bolden MD      ALPRAZolam  0 25 mg Oral BID PRN Jocelyne Alejo MD      aspirin  81 mg Oral Daily Jocelyne Alejo MD      bismuth subsalicylate  037 mg Oral Q6H PRN Jocelyne Alejo MD      docusate sodium  100 mg Oral BID Jocelyne Alejo MD      fluticasone  1 spray Each Nare BID Jocelyne Alejo MD      gabapentin  600 mg Oral Daily Jocelyne Alejo MD      heparin (porcine)  5,000 Units Subcutaneous [facility] Jocelyne Alejo MD      insulin glargine  10 Units Subcutaneous HS Jocelyne Alejo MD      insulin lispro  1-6 Units Subcutaneous TID [facility] Jocelyne Alejo MD      isosorbide mononitrate  30 mg Oral Daily Jocelyne Alejo MD      latanoprost  1 drop Both Eyes HS Jocelyne Alejo MD      metoprolol succinate  100 mg Oral Daily Jocelyne Alejo MD      oxyCODONE  5 mg Oral Q4H PRN Jocelyne Alejo MD      pantoprazole  40 mg Oral Early Morning Jocelyne Alejo MD      sertraline  50 mg Oral Daily Jocelyne Alejo MD      sodium chloride  105 mL/hr Intravenous Continuous Jocelyne Alejo  mL/hr (02/08/21 1112)    tamsulosin  0 4 mg Oral HS Jocelyne Alejo MD      zolpidem  5 mg Oral HS PRN Jocelyne Alejo MD         PRN Meds:   acetaminophen    ALPRAZolam    bismuth subsalicylate    oxyCODONE    zolpidem    Continuous Infusions:sodium chloride, 105 mL/hr, Last Rate: 500 mL/hr (02/08/21 1112)        Allergies   Allergen Reactions    Atorvastatin Hives, Itching and Rash    Simvastatin Rash and Edema     "ALL STATINS"    Statins Itching    Insulin Lispro Swelling and Edema    Medical Tape      rash to electrodes    Other Itching, Rash and Other (See Comments) rash to electrodes and adhesives         Invasive Devices: Invasive Devices     Peripheral Intravenous Line            Peripheral IV 21 Left Antecubital less than 1 day                  PHYSICAL EXAM  /77 (BP Location: Right arm)   Pulse 57   Temp (!) 96 9 °F (36 1 °C) (Temporal)   Resp 18   Ht 6' 4" (1 93 m)   Wt 105 kg (232 lb 9 4 oz)   SpO2 98%   BMI 28 31 kg/m²   Temp (24hrs), Av 2 °F (36 2 °C), Min:96 3 °F (35 7 °C), Max:97 5 °F (36 4 °C)        Intake/Output Summary (Last 24 hours) at 2021 1334  Last data filed at 2021 1308  Gross per 24 hour   Intake 1420 ml   Output 1200 ml   Net 220 ml       I/O last 24 hours: In: 7275 [P O :320; I V :1100]  Out: 1200 [Urine:1200]          Current Weight: Weight - Scale: 105 kg (232 lb 9 4 oz)  First Weight: Weight - Scale: 105 kg (232 lb 9 4 oz)  Physical Exam  Vitals signs and nursing note reviewed  Constitutional:       General: He is not in acute distress  Appearance: Normal appearance  He is normal weight  He is not ill-appearing, toxic-appearing or diaphoretic  HENT:      Head: Normocephalic and atraumatic  Mouth/Throat:      Mouth: Mucous membranes are dry  Pharynx: Oropharynx is clear  No oropharyngeal exudate  Eyes:      General: No scleral icterus  Neck:      Musculoskeletal: Normal range of motion and neck supple  Cardiovascular:      Rate and Rhythm: Normal rate  Heart sounds: Normal heart sounds  No friction rub  Pulmonary:      Effort: Pulmonary effort is normal  No respiratory distress  Breath sounds: Normal breath sounds  No wheezing  Abdominal:      General: There is no distension  Palpations: Abdomen is soft  There is no mass  Tenderness: There is no abdominal tenderness  Musculoskeletal:         General: No swelling  Comments: Left lower extremity wound dressing in place   Skin:     General: Skin is warm  Coloration: Skin is not jaundiced     Neurological: General: No focal deficit present  Mental Status: He is alert and oriented to person, place, and time  Psychiatric:         Mood and Affect: Mood normal          Behavior: Behavior normal            LABORATORY:    Results from last 7 days   Lab Units 02/08/21  0611 02/07/21  1458 02/04/21  1304   WBC Thousand/uL 6 74  --  9 11   HEMOGLOBIN g/dL 10 4*  --  10 4*   HEMATOCRIT % 31 4*  --  32 6*   PLATELETS Thousands/uL 164 203 175   POTASSIUM mmol/L 4 5  --  4 9   CHLORIDE mmol/L 106  --  109*   CO2 mmol/L 24  --  26   BUN mg/dL 38*  --  50*   CREATININE mg/dL 2 03*  --  2 45*   CALCIUM mg/dL 8 3  --  9 4      rest all reviewed    RADIOLOGY:  IR lower extremity angiogram    (Results Pending)     Rest all reviewed    Portions of the record may have been created with voice recognition software  Occasional wrong word or "sound a like" substitutions may have occurred due to the inherent limitations of voice recognition software  Read the chart carefully and recognize, using context, where substitutions have occurred  If you have any questions, please contact the dictating provider

## 2021-02-08 NOTE — PROGRESS NOTES
Progress Note - Vascular Surgery   Stokes Charley 66 y o  male MRN: [id number]  Unit/Bed#: E5 -01 Encounter: [id number]    Assessment:  67 yo M with delayed wound healing s/p left 5 th metatarsal resection    Plan:   NPO   Continue IVF    F/u nephro   Angiogram today    Subjective/Objective     Subjective:   Mild pain in left foot    Objective:    Blood pressure 156/79, pulse 62, temperature (!) 97 2 °F (36 2 °C), temperature source Temporal, resp  rate 18, height 6' 4" (1 93 m), weight 105 kg (232 lb 9 4 oz), SpO2 97 %  ,Body mass index is 28 31 kg/m²        Intake/Output Summary (Last 24 hours) at 2/8/2021 0643  Last data filed at 2/8/2021 0301  Gross per 24 hour   Intake 320 ml   Output 600 ml   Net -280 ml       Invasive Devices     Peripheral Intravenous Line            Peripheral IV 02/07/21 Left Antecubital less than 1 day                Physical Exam:   Gen:  NAD  CV:  warm, well-perfused  Lungs: nl effort  Abd:  soft, NT/ND  Ext:  no CCE, dressing in place in left foot, doppler signal in PT/DP on left  Neuro: A&Ox3     Results from last 7 days   Lab Units 02/08/21  0611 02/07/21  1458 02/04/21  1304   WBC Thousand/uL 6 74  --  9 11   HEMOGLOBIN g/dL 10 4*  --  10 4*   HEMATOCRIT % 31 4*  --  32 6*   PLATELETS Thousands/uL 164 203 175     Results from last 7 days   Lab Units 02/04/21  1304   POTASSIUM mmol/L 4 9   CHLORIDE mmol/L 109*   CO2 mmol/L 26   BUN mg/dL 50*   CREATININE mg/dL 2 45*   CALCIUM mg/dL 9 4

## 2021-02-08 NOTE — PLAN OF CARE
Problem: Potential for Falls  Goal: Patient will remain free of falls  Description: INTERVENTIONS:  - Assess patient frequently for physical needs  -  Identify cognitive and physical deficits and behaviors that affect risk of falls  -  Duarte fall precautions as indicated by assessment   - Educate patient/family on patient safety including physical limitations  - Instruct patient to call for assistance with activity based on assessment  - Modify environment to reduce risk of injury  - Consider OT/PT consult to assist with strengthening/mobility  Outcome: Progressing     Problem: NEUROSENSORY - ADULT  Goal: Achieves stable or improved neurological status  Description: INTERVENTIONS  - Monitor and report changes in neurological status  - Monitor vital signs such as temperature, blood pressure, glucose, and any other labs ordered   - Initiate measures to prevent increased intracranial pressure  - Monitor for seizure activity and implement precautions if appropriate      Outcome: Progressing  Goal: Remains free of injury related to seizures activity  Description: INTERVENTIONS  - Maintain airway, patient safety  and administer oxygen as ordered  - Monitor patient for seizure activity, document and report duration and description of seizure to physician/advanced practitioner  - If seizure occurs,  ensure patient safety during seizure  - Reorient patient post seizure  - Seizure pads on all 4 side rails  - Instruct patient/family to notify RN of any seizure activity including if an aura is experienced  - Instruct patient/family to call for assistance with activity based on nursing assessment  - Administer anti-seizure medications if ordered    Outcome: Progressing  Goal: Achieves maximal functionality and self care  Description: INTERVENTIONS  - Monitor swallowing and airway patency with patient fatigue and changes in neurological status  - Encourage and assist patient to increase activity and self care     - Encourage visually impaired, hearing impaired and aphasic patients to use assistive/communication devices  Outcome: Progressing     Problem: CARDIOVASCULAR - ADULT  Goal: Maintains optimal cardiac output and hemodynamic stability  Description: INTERVENTIONS:  - Monitor I/O, vital signs and rhythm  - Monitor for S/S and trends of decreased cardiac output  - Administer and titrate ordered vasoactive medications to optimize hemodynamic stability  - Assess quality of pulses, skin color and temperature  - Assess for signs of decreased coronary artery perfusion  - Instruct patient to report change in severity of symptoms  Outcome: Progressing  Goal: Absence of cardiac dysrhythmias or at baseline rhythm  Description: INTERVENTIONS:  - Continuous cardiac monitoring, vital signs, obtain 12 lead EKG if ordered  - Administer antiarrhythmic and heart rate control medications as ordered  - Monitor electrolytes and administer replacement therapy as ordered  Outcome: Progressing     Problem: RESPIRATORY - ADULT  Goal: Achieves optimal ventilation and oxygenation  Description: INTERVENTIONS:  - Assess for changes in respiratory status  - Assess for changes in mentation and behavior  - Position to facilitate oxygenation and minimize respiratory effort  - Oxygen administered by appropriate delivery if ordered  - Initiate smoking cessation education as indicated  - Encourage broncho-pulmonary hygiene including cough, deep breathe, Incentive Spirometry  - Assess the need for suctioning and aspirate as needed  - Assess and instruct to report SOB or any respiratory difficulty  - Respiratory Therapy support as indicated  Outcome: Progressing     Problem: GENITOURINARY - ADULT  Goal: Maintains or returns to baseline urinary function  Description: INTERVENTIONS:  - Assess urinary function  - Encourage oral fluids to ensure adequate hydration if ordered  - Administer IV fluids as ordered to ensure adequate hydration  - Administer ordered medications as needed  - Offer frequent toileting  - Follow urinary retention protocol if ordered  Outcome: Progressing  Goal: Absence of urinary retention  Description: INTERVENTIONS:  - Assess patients ability to void and empty bladder  - Monitor I/O  - Bladder scan as needed  - Discuss with physician/AP medications to alleviate retention as needed  - Discuss catheterization for long term situations as appropriate  Outcome: Progressing     Problem: METABOLIC, FLUID AND ELECTROLYTES - ADULT  Goal: Electrolytes maintained within normal limits  Description: INTERVENTIONS:  - Monitor labs and assess patient for signs and symptoms of electrolyte imbalances  - Administer electrolyte replacement as ordered  - Monitor response to electrolyte replacements, including repeat lab results as appropriate  - Instruct patient on fluid and nutrition as appropriate  Outcome: Progressing  Goal: Fluid balance maintained  Description: INTERVENTIONS:  - Monitor labs   - Monitor I/O and WT  - Instruct patient on fluid and nutrition as appropriate  - Assess for signs & symptoms of volume excess or deficit  Outcome: Progressing  Goal: Glucose maintained within target range  Description: INTERVENTIONS:  - Monitor Blood Glucose as ordered  - Assess for signs and symptoms of hyperglycemia and hypoglycemia  - Administer ordered medications to maintain glucose within target range  - Assess nutritional intake and initiate nutrition service referral as needed  Outcome: Progressing     Problem: SKIN/TISSUE INTEGRITY - ADULT  Goal: Skin integrity remains intact  Description: INTERVENTIONS  - Identify patients at risk for skin breakdown  - Assess and monitor skin integrity  - Assess and monitor nutrition and hydration status  - Monitor labs (i e  albumin)  - Assess for incontinence   - Turn and reposition patient  - Assist with mobility/ambulation  - Relieve pressure over bony prominences  - Avoid friction and shearing  - Provide appropriate hygiene as needed including keeping skin clean and dry  - Evaluate need for skin moisturizer/barrier cream  - Collaborate with interdisciplinary team (i e  Nutrition, Rehabilitation, etc )   - Patient/family teaching  Outcome: Progressing  Goal: Incision(s), wounds(s) or drain site(s) healing without S/S of infection  Description: INTERVENTIONS  - Assess and document risk factors for skin impairment   - Assess and document dressing, incision, wound bed, drain sites and surrounding tissue  - Consider nutrition services referral as needed  - Oral mucous membranes remain intact  - Provide patient/ family education  Outcome: Progressing  Goal: Oral mucous membranes remain intact  Description: INTERVENTIONS  - Assess oral mucosa and hygiene practices  - Implement preventative oral hygiene regimen  - Implement oral medicated treatments as ordered  - Initiate Nutrition services referral as needed  Outcome: Progressing     Problem: MUSCULOSKELETAL - ADULT  Goal: Maintain or return mobility to safest level of function  Description: INTERVENTIONS:  - Assess patient's ability to carry out ADLs; assess patient's baseline for ADL function and identify physical deficits which impact ability to perform ADLs (bathing, care of mouth/teeth, toileting, grooming, dressing, etc )  - Assess/evaluate cause of self-care deficits   - Assess range of motion  - Assess patient's mobility  - Assess patient's need for assistive devices and provide as appropriate  - Encourage maximum independence but intervene and supervise when necessary  - Involve family in performance of ADLs  - Assess for home care needs following discharge   - Consider OT consult to assist with ADL evaluation and planning for discharge  - Provide patient education as appropriate  Outcome: Progressing  Goal: Maintain proper alignment of affected body part  Description: INTERVENTIONS:  - Support, maintain and protect limb and body alignment  - Provide patient/ family with appropriate education  Outcome: Progressing

## 2021-02-08 NOTE — CONSULTS
Consult - Podiatry   Francisco Zamora 66 y o  male MRN: [id number]  Unit/Bed#: E5 -01 Encounter: [id number]    Assessment:  76M with PAD, diabetes with peripheral neuropathy here with nonhealing surgical wound left foot   - s/p left partial 5th met resection due to positive margins 12/31/20  - s/p left partial 5th ray resection due to osteomyelitis 12/21/20    Plan:  · Left foot wound is currently stable, recommend local wound care with serial exams and monitor for acute signs of infection, no need for antibiotics at this time  · Discussed with Dr Best Ricketts, patient will likely return for retrograde approach left lower extremity arteriogram with intervention during this admission  · Weight bear as tolerated in surgical shoe  · Rest of care per primary team    History of Present Illness   Chief Complaint: "nonhealing wound left foot"    HPI:  Francisco Zamora is a 66 y o  male with PMH significant for peripheral arterial disease, chronic kidney disease, diabetes mellitus with peripheral neuropathy is admitted for left lower extremity arteriogram with vascular intervention  Podiatry is consulted for evaluation and management of left foot wound  Patient has undergone partial 5th ray amputation of his left foot during last admission on 12/21/20 due to osteomyelitis, he then underwent partial 5th metatarsal resection on 12/31/20 due to positive margins  His wound healing has been slow since and has been following with vascular surgery  Today he underwent left lower extremity arteriogram however it was unsuccessful in resolving his posterior tibial lesion  He denies nausea, vomiting, fever, chills, shortness of breath, chest pain  Reports mild pain to his left foot  Consults  Review of Systems   Constitutional: Negative  HENT: Negative  Eyes: Negative  Respiratory: Negative  Cardiovascular: Negative  Gastrointestinal: Negative  Musculoskeletal: negative   Skin:left foot wound   Neurological: Negative  Psych: negative  Historical Information   Past Medical History:   Diagnosis Date    Anemia     Last assessed: 9/28/17    Anxiety     Arteriosclerotic cardiovascular disease     Last assessed: 9/28/17    Arthritis     Bladder cancer ([facility] [address] )     bladder- had BCG treatments    Chronic kidney disease     CKD (chronic kidney disease) stage 4, GFR 15-29 ml/min ([facility]     Colon polyp     Coronary artery disease     7 stents    Depression     Diabetes mellitus ([facility])     IDDM    GERD (gastroesophageal reflux disease)     Glaucoma     History of fusion of cervical spine     Hyperlipidemia     Hypertension     Insomnia     Last assessed: 11/14/12    Loss of hearing     has hearing aids but usually does not wear them    Other seasonal allergic rhinitis     Last assessed: 2/10/16    PAD (peripheral artery disease) ([facility])     Shortness of breath     on exertion    Spinal stenosis of lumbar region     Transient cerebral ischemia     Uses walker     w/c for longer distances     Past Surgical History:   Procedure Laterality Date    CARDIAC SURGERY      Cath stent placement  Last assessed: 3/9/17  Interventional Catheterization    CHOLECYSTECTOMY      COLONOSCOPY      CYSTOSCOPY      Diagnostic w/biopsy  Irene Bustos  Last assessed: 12/1/14    CYSTOURETHROSCOPY      w/cautery  Irene Bustos    GASTRIC BYPASS      For morbid obesity w/Shaji-en-Y   Resolved: 11/17/09    INCISION AND DRAINAGE OF WOUND Right 2/26/2017    Procedure: INCISION AND DRAINAGE (I&D) EXTREMITY WITH APPLICATION OF GRAFT JACKET;  Surgeon: Alejandra Vail DPM;  [facility];  Service:     INCISION AND DRAINAGE OF WOUND Right 4/25/2017    Procedure: INCISION AND DRAINAGE (I&D) EXTREMITY, APPLICATION OF GRAFT;  Surgeon: Alejandra Vail DPM;  [facility];  Service:     IR BIOPSY OTHER  7/2/2020    IR TUNNELED CENTRAL LINE PLACEMENT  12/24/2020    JOINT REPLACEMENT      christofer knees replaced    VT AMPUTATION METATARSAL+TOE,SINGLE Left 2020    Procedure: RAY RESECTION FOOT;  Surgeon: Bernadine Suazo DPM;  [facility];  Service: Podiatry    ID AMPUTATION METATARSAL+TOE,SINGLE Left 2020    Procedure: 5TH MET RESECTION;  Surgeon: Bernadine Suazo DPM;  [facility];  Service: Podiatry    ID CYSTOURETHROSCOPY,BIOPSY N/A 2016    Procedure: Bandar Everson;  Surgeon: Liberty Varner MD;  [facility];  Service: Urology    ID CYSTOURETHROSCOPY,FULGUR <0 5 CM LESN N/A 2020    Procedure: CYSTO W/BIOPSIES, transurethral prostate bx;  Surgeon: Kylee Vegas MD;  [facility];  Service: Urology    ROTATOR CUFF REPAIR      SMALL INTESTINE SURGERY      Surgery Shaji-en-Y    SPINAL FUSION      lumbar and cervical fusions    VAC DRESSING APPLICATION Right 498    Procedure: APPLICATION VAC DRESSING;  Surgeon: Ray Almanzar DPM;  [facility];  Service:      Social History   Social History     Substance and Sexual Activity   Alcohol Use Yes    Frequency: 2-3 times a week    Drinks per session: 1 or 2    Binge frequency: Never    Comment: beer / liquor     Social History     Substance and Sexual Activity   Drug Use Not Currently    Types: Marijuana    Comment: quit 2019 had medical marijuana     Social History     Tobacco Use   Smoking Status Former Smoker    Quit date: 1970    Years since quittin 1   Smokeless Tobacco Never Used     Family History:   Family History   Problem Relation Age of Onset    Diabetes Mother     Heart disease Mother     Other Mother         High blood pressure    Heart disease Father     Diabetes Sister     Other Sister         High blood pressure    Kidney disease Sister     Heart disease Brother     Other Brother         High blood pressure       Meds/Allergies   Medications Prior to Admission   Medication    acetaminophen (TYLENOL) 325 mg tablet    ALPRAZolam (XANAX) 0 25 mg tablet    aspirin 81 mg chewable tablet    Azelastine HCl 0 15 % SOLN    bismuth subsalicylate (PEPTO BISMOL) 262 MG chewable tablet    calcitriol (ROCALTROL) 0 25 mcg capsule    Cholecalciferol (Vitamin D3) 1 25 MG (48931 UT) CAPS    docusate sodium (COLACE) 100 mg capsule    fluticasone (FLONASE) 50 mcg/act nasal spray    furosemide (LASIX) 20 mg tablet    gabapentin (NEURONTIN) 600 MG tablet    Insulin Pen Needle 31G X 8 MM MISC    isosorbide mononitrate (IMDUR) 30 mg 24 hr tablet    Lantus SoloStar 100 units/mL injection pen    latanoprost (XALATAN) 0 005 % ophthalmic solution    metoprolol succinate (TOPROL-XL) 100 mg 24 hr tablet    Mirabegron ER (Myrbetriq) 50 MG TB24    multivitamin (THERAGRAN) TABS    NovoLOG FlexPen 100 units/mL injection pen    pantoprazole (PROTONIX) 40 mg tablet    sertraline (ZOLOFT) 50 mg tablet    tamsulosin (FLOMAX) 0 4 mg     Allergies   Allergen Reactions    Atorvastatin Hives, Itching and Rash    Simvastatin Rash and Edema     "ALL STATINS"    Statins Itching    Insulin Lispro Swelling and Edema    Medical Tape      rash to electrodes    Other Itching, Rash and Other (See Comments)     rash to electrodes and adhesives       Objective   First Vitals:   Blood Pressure: 133/70 (02/07/21 1350)  Pulse: 70 (02/07/21 1350)  Temperature: 97 5 °F (36 4 °C) (02/07/21 1350)  Temp Source: Temporal (02/07/21 1350)  Respirations: 18 (02/07/21 1350)  Height: 6' 4" (193 cm) (02/07/21 1350)  Weight - Scale: 105 kg (232 lb 9 4 oz) (02/07/21 1350)  SpO2: 99 % (02/07/21 1350)    Current Vitals:   Blood Pressure: 102/55 (02/08/21 1432)  Pulse: 64 (02/08/21 1432)  Temperature: (!) 96 5 °F (35 8 °C) (02/08/21 1432)  Temp Source: Temporal (02/08/21 1432)  Respirations: 18 (02/08/21 1432)  Height: 6' 4" (193 cm) (02/07/21 1350)  Weight - Scale: 105 kg (232 lb 9 4 oz) (02/07/21 1350)  SpO2: 98 % (02/08/21 1432)    /55 (BP Location: Right arm)   Pulse 64   Temp (!) 96 5 °F (35 8 °C) (Temporal)   Resp 18   Ht 6' 4" (1 93 m)   Wt 105 kg (232 lb 9 4 oz)   SpO2 98%   BMI 28 31 kg/m²      Physical Exam    General Appearance:    Alert, cooperative, no distress   Head:    Normocephalic, without obvious abnormality, atraumatic   Neck:   Supple, symmetrical, trachea midline   Lungs:     Respirations unlabored, no audible wheezes   Abdomen:     Soft, non-tender   Lower Extremities:    Vascular:   Right DP pulse is 0/4, Right PT pulse is 0/4, Left DP pulse is 0/4, Left PT pulse is 0/4  Pulses are biphasic on right and monophasic on left on Doppler exam  CRT < 3 seconds at the digits  Pedal hair is absent  Musculoskeletal:  MMT is 5/5 in all muscle compartments bilaterally  ROM at the 1st MPJ and ankle joint are equal bilaterally with the leg extended  No gross deformities noted  Dermatological:  Left lateral surgical wound dehiscence, there are intermittent sutures intact  Wound bed is fibrotic, does not probe to bone at this time, no acute signs of infection  Neurological:  Gross sensation is intact  Protective sensation is absent  Patient Denies numbness and/or paresthesias            Lab Results:   Admission on 02/07/2021   Component Date Value    Platelets 06/94/3279 203     MPV 02/07/2021 10 7     POC Glucose 02/07/2021 94     POC Glucose 02/07/2021 154*    Sodium 02/08/2021 139     Potassium 02/08/2021 4 5     Chloride 02/08/2021 106     CO2 02/08/2021 24     ANION GAP 02/08/2021 9     BUN 02/08/2021 38*    Creatinine 02/08/2021 2 03*    Glucose 02/08/2021 104     Glucose, Fasting 02/08/2021 104*    Calcium 02/08/2021 8 3     eGFR 02/08/2021 35     WBC 02/08/2021 6 74     RBC 02/08/2021 3 39*    Hemoglobin 02/08/2021 10 4*    Hematocrit 02/08/2021 31 4*    MCV 02/08/2021 93     MCH 02/08/2021 30 7     MCHC 02/08/2021 33 1     RDW 02/08/2021 13 2     Platelets 32/27/6208 164     MPV 02/08/2021 10 7     Protime 02/08/2021 13 6     INR 02/08/2021 1 06     POC Glucose 02/08/2021 104     POC Glucose 02/08/2021 105                            Imaging: I have personally reviewed pertinent films in PACS  EKG, Pathology, and Other Studies: I have personally reviewed pertinent reports  Code Status: Level 1 - Full Code      Portions of the record may have been created with voice recognition software  Occasional wrong word or "sound a like" substitutions may have occurred due to the inherent limitations of voice recognition software  Read the chart carefully and recognize, using context, where substitutions have occurred      Aron Sharif DPM

## 2021-02-09 DIAGNOSIS — Z23 ENCOUNTER FOR IMMUNIZATION: ICD-10-CM

## 2021-02-09 PROBLEM — R31.0 GROSS HEMATURIA: Status: ACTIVE | Noted: 2021-02-09

## 2021-02-09 LAB
ANION GAP SERPL CALCULATED.3IONS-SCNC: 9 MMOL/L (ref 4–13)
BACTERIA UR QL AUTO: ABNORMAL /HPF
BILIRUB UR QL STRIP: ABNORMAL
BUN SERPL-MCNC: 33 MG/DL (ref 5–25)
CALCIUM SERPL-MCNC: 7.9 MG/DL (ref 8.3–10.1)
CHLORIDE SERPL-SCNC: 106 MMOL/L (ref 100–108)
CLARITY UR: ABNORMAL
CO2 SERPL-SCNC: 23 MMOL/L (ref 21–32)
COLOR UR: ABNORMAL
CREAT SERPL-MCNC: 1.96 MG/DL (ref 0.6–1.3)
ERYTHROCYTE [DISTWIDTH] IN BLOOD BY AUTOMATED COUNT: 13.2 % (ref 11.6–15.1)
GFR SERPL CREATININE-BSD FRML MDRD: 37 ML/MIN/1.73SQ M
GLUCOSE SERPL-MCNC: 106 MG/DL (ref 65–140)
GLUCOSE SERPL-MCNC: 127 MG/DL (ref 65–140)
GLUCOSE SERPL-MCNC: 145 MG/DL (ref 65–140)
GLUCOSE SERPL-MCNC: 146 MG/DL (ref 65–140)
GLUCOSE SERPL-MCNC: 182 MG/DL (ref 65–140)
GLUCOSE UR STRIP-MCNC: ABNORMAL MG/DL
HCT VFR BLD AUTO: 28.4 % (ref 36.5–49.3)
HGB BLD-MCNC: 9.5 G/DL (ref 12–17)
HGB UR QL STRIP.AUTO: ABNORMAL
KETONES UR STRIP-MCNC: ABNORMAL MG/DL
LEUKOCYTE ESTERASE UR QL STRIP: ABNORMAL
MCH RBC QN AUTO: 31.4 PG (ref 26.8–34.3)
MCHC RBC AUTO-ENTMCNC: 33.5 G/DL (ref 31.4–37.4)
MCV RBC AUTO: 94 FL (ref 82–98)
NITRITE UR QL STRIP: POSITIVE
NON-SQ EPI CELLS URNS QL MICRO: ABNORMAL /HPF
PH UR STRIP.AUTO: 6.5 [PH]
PLATELET # BLD AUTO: 154 THOUSANDS/UL (ref 149–390)
PMV BLD AUTO: 10.1 FL (ref 8.9–12.7)
POTASSIUM SERPL-SCNC: 4.5 MMOL/L (ref 3.5–5.3)
PROT UR STRIP-MCNC: >=300 MG/DL
RBC # BLD AUTO: 3.03 MILLION/UL (ref 3.88–5.62)
RBC #/AREA URNS AUTO: ABNORMAL /HPF
SODIUM SERPL-SCNC: 138 MMOL/L (ref 136–145)
SP GR UR STRIP.AUTO: 1.01 (ref 1–1.03)
UROBILINOGEN UR QL STRIP.AUTO: 4 E.U./DL
WBC # BLD AUTO: 7.78 THOUSAND/UL (ref 4.31–10.16)
WBC #/AREA URNS AUTO: ABNORMAL /HPF

## 2021-02-09 PROCEDURE — 80048 BASIC METABOLIC PNL TOTAL CA: CPT | Performed by: INTERNAL MEDICINE

## 2021-02-09 PROCEDURE — 99232 SBSQ HOSP IP/OBS MODERATE 35: CPT | Performed by: SURGERY

## 2021-02-09 PROCEDURE — 81001 URINALYSIS AUTO W/SCOPE: CPT | Performed by: NURSE PRACTITIONER

## 2021-02-09 PROCEDURE — 85027 COMPLETE CBC AUTOMATED: CPT | Performed by: SURGERY

## 2021-02-09 PROCEDURE — 87086 URINE CULTURE/COLONY COUNT: CPT | Performed by: NURSE PRACTITIONER

## 2021-02-09 PROCEDURE — 99024 POSTOP FOLLOW-UP VISIT: CPT | Performed by: NURSE PRACTITIONER

## 2021-02-09 PROCEDURE — 0T9B70Z DRAINAGE OF BLADDER WITH DRAINAGE DEVICE, VIA NATURAL OR ARTIFICIAL OPENING: ICD-10-PCS | Performed by: PODIATRIST

## 2021-02-09 PROCEDURE — 82948 REAGENT STRIP/BLOOD GLUCOSE: CPT

## 2021-02-09 PROCEDURE — 99233 SBSQ HOSP IP/OBS HIGH 50: CPT | Performed by: INTERNAL MEDICINE

## 2021-02-09 PROCEDURE — NC001 PR NO CHARGE: Performed by: NURSE PRACTITIONER

## 2021-02-09 RX ORDER — CEFAZOLIN SODIUM 2 G/50ML
2000 SOLUTION INTRAVENOUS ONCE
Status: COMPLETED | OUTPATIENT
Start: 2021-02-09 | End: 2021-02-10

## 2021-02-09 RX ORDER — LIDOCAINE HYDROCHLORIDE 20 MG/ML
1 JELLY TOPICAL ONCE
Status: COMPLETED | OUTPATIENT
Start: 2021-02-09 | End: 2021-02-09

## 2021-02-09 RX ORDER — SODIUM CHLORIDE 9 MG/ML
100 INJECTION, SOLUTION INTRAVENOUS CONTINUOUS
Status: DISCONTINUED | OUTPATIENT
Start: 2021-02-10 | End: 2021-02-10

## 2021-02-09 RX ADMIN — LIDOCAINE HYDROCHLORIDE 1 APPLICATION: 20 JELLY TOPICAL at 12:57

## 2021-02-09 RX ADMIN — ZOLPIDEM TARTRATE 5 MG: 5 TABLET, COATED ORAL at 22:29

## 2021-02-09 RX ADMIN — ZOLPIDEM TARTRATE 5 MG: 5 TABLET, COATED ORAL at 00:36

## 2021-02-09 RX ADMIN — GABAPENTIN 600 MG: 300 CAPSULE ORAL at 09:11

## 2021-02-09 RX ADMIN — TAMSULOSIN HYDROCHLORIDE 0.4 MG: 0.4 CAPSULE ORAL at 22:28

## 2021-02-09 RX ADMIN — LATANOPROST 1 DROP: 50 SOLUTION OPHTHALMIC at 22:29

## 2021-02-09 RX ADMIN — PANTOPRAZOLE SODIUM 40 MG: 40 TABLET, DELAYED RELEASE ORAL at 05:11

## 2021-02-09 RX ADMIN — DOCUSATE SODIUM 100 MG: 100 CAPSULE, LIQUID FILLED ORAL at 17:20

## 2021-02-09 RX ADMIN — OXYCODONE HYDROCHLORIDE 5 MG: 5 TABLET ORAL at 00:34

## 2021-02-09 RX ADMIN — OXYCODONE HYDROCHLORIDE 5 MG: 5 TABLET ORAL at 06:37

## 2021-02-09 RX ADMIN — INSULIN GLARGINE 10 UNITS: 100 INJECTION, SOLUTION SUBCUTANEOUS at 22:28

## 2021-02-09 RX ADMIN — DOCUSATE SODIUM 100 MG: 100 CAPSULE, LIQUID FILLED ORAL at 09:11

## 2021-02-09 RX ADMIN — ISOSORBIDE MONONITRATE 30 MG: 30 TABLET, EXTENDED RELEASE ORAL at 09:11

## 2021-02-09 RX ADMIN — INSULIN LISPRO 1 UNITS: 100 INJECTION, SOLUTION INTRAVENOUS; SUBCUTANEOUS at 11:55

## 2021-02-09 RX ADMIN — OXYCODONE HYDROCHLORIDE 5 MG: 5 TABLET ORAL at 19:47

## 2021-02-09 RX ADMIN — ASPIRIN 81 MG: 81 TABLET, CHEWABLE ORAL at 09:11

## 2021-02-09 RX ADMIN — SERTRALINE HYDROCHLORIDE 50 MG: 50 TABLET ORAL at 09:11

## 2021-02-09 RX ADMIN — METOPROLOL SUCCINATE 100 MG: 50 TABLET, EXTENDED RELEASE ORAL at 09:11

## 2021-02-09 NOTE — CASE MANAGEMENT
Patient is here with poor healing of the left 5th metatarsal resection site  Patient has podiatry, vascular, urology and nephrology following  Patient is s/p left lower extremity arteriogram 2/8  Angiogram scheduled to be repeated  Patient pending medical clearance  Patient just completed rehab at [facility]  CM will continue to follow

## 2021-02-09 NOTE — PROGRESS NOTES
Follow up Consultation    Nephrology   Jaida Ambrose 66 y o  male MRN: [id number]  Unit/Bed#: E5 -01 Encounter: [id number]      Physician Requesting Consult: Temi Vila MD        ASSESSMENT/PLAN:  66 y o   male with pmh of hypertension, hyperlipidemia, arthritis, diabetes, CAD and CKD stage 4 who was admitted for poor healing of left 5th metatarsal resection site  Nephrology has been consulted for perioperative optimization to reduce incidence for acute kidney injury      · CKD stage 4:  - patient at risk for acute kidney injury secondary to underlying comorbidities after exposure to contrast for angiogram on 02/08/2021 due to ROSSY and further risk for acute kidney injury as he is planned for repeat intervention with IR on 02/11/2021   - After review of records it appears that the patient has a baseline Creatinine of 2 2-2 5 mg/dL  - most recent creatinine prior to admission on 02/04/2021 at 2 45 mg/dL  - patient's creatinine today is at  1 96  mg/dL, stable and below baseline   -  hold further IV fluids for now  - recommend starting patient on IV fluids of Plasmalyte at 75 cc an hour for 6 hours pre and 6 hours post next planned procedure on 02/11/2021 along with Mucomyst 2 doses pre and post  - check BMP, magnesium, phosphorus in a m   - Clinically patient is not uremic and there is no acute indication for renal replacement therapy (dialysis)  - Optimize hemodynamic status to avoid delay in renal recovery    - Avoid nephrotoxins, adjust meds to appropriate GFR   - Strict I/O   - Daily weights  - Urinary retention protocol if patient does not have a [place]  - Most likely has underlying CKD secondary to diabetic kidney disease plus hypertensive nephrosclerosis plus age-related nephron loss plus likely underlying renal vascular disease  - will need to set up patient for follow up with Nephrology as an outpatient post hospitalization   - as an outpatient for nephrology, patient follows up with [facility]     · Blood pressure/hypertension:  - current medications:  Imdur 30 mg p o  Q day, Toprol- mg p o  Q day  - recommendations:  No changes for now  - Optimize hemodynamics   - Maintain MAP > 65mmHg  - Avoid BP fluctuations      · H/H/anemia:  - most recent hemoglobin at  9 5 grams/deciliter  - maintain hemoglobin greater than 8 grams/deciliter  - likely has underlying anemia secondary to CKD     · Acid-base electrolytes:  ? Electrolyte:    § Stable  ? Acid-base:    § Most recent bicarb at 23     · Volume status:  ?   hold further IV fluids for now     · Proteinuria:   ? Most likely secondary to diabetic kidney disease  ? Most recent protein creatinine ratio 530 mg as of 2020     · Other medical problems:  ? Diabetes:  Management per primary team   On insulin, most recent A1c of 7% as of 2021  ? Left lower extremity wound:  Management per primary team   Follow-up with vascular  Status post angiogram on 2021 with angioplasty of left PT, will require IR intervention for retrograde access  ? Hematuria:  Continue to monitor for now follow-up primary team   Follow-up with Urology     Thanks for the consult  Will continue to follow  Please call with questions/ concerns  Above-mentioned orders and Plan in terms of perioperative management was discussed with the team in Manuel Meza MD, [facility], 2021, 10:27 AM              Objective :   Patient seen and examined in his room urine output close to 2 L last 24 hours hemodynamically stable remains afebrile still with hematuria        PHYSICAL EXAM  /58 (BP Location: Left arm)   Pulse 75   Temp 98 1 °F (36 7 °C) (Temporal)   Resp 16   Ht 6' 4" (1 93 m)   Wt 110 kg (242 lb 8 1 oz)   SpO2 97%   BMI 29 52 kg/m²   Temp (24hrs), Av 4 °F (36 3 °C), Min:96 3 °F (35 7 °C), Max:98 2 °F (36 8 °C)        Intake/Output Summary (Last 24 hours) at 2021 1027  Last data filed at 2021 0601  Gross per 24 hour   Intake 1800 ml   Output 1750 ml   Net 50 ml       I/O last 24 hours: In: 2800 [P O :720; I V :2080]  Out: 2000 [Urine:2000]      Current Weight: Weight - Scale: 110 kg (242 lb 8 1 oz)  First Weight: Weight - Scale: 105 kg (232 lb 9 4 oz)  Physical Exam  Vitals signs and nursing note reviewed  Constitutional:       General: He is not in acute distress  Appearance: Normal appearance  He is normal weight  He is not ill-appearing, toxic-appearing or diaphoretic  HENT:      Head: Normocephalic and atraumatic  Mouth/Throat:      Mouth: Mucous membranes are moist       Pharynx: Oropharynx is clear  No oropharyngeal exudate  Eyes:      General: No scleral icterus  Conjunctiva/sclera: Conjunctivae normal    Neck:      Musculoskeletal: Normal range of motion and neck supple  Cardiovascular:      Rate and Rhythm: Normal rate  Heart sounds: Normal heart sounds  No friction rub  Pulmonary:      Effort: Pulmonary effort is normal  No respiratory distress  Breath sounds: Normal breath sounds  Abdominal:      General: There is no distension  Palpations: Abdomen is soft  There is no mass  Tenderness: There is no abdominal tenderness  Musculoskeletal:         General: No swelling  Skin:     General: Skin is warm  Coloration: Skin is not jaundiced  Neurological:      General: No focal deficit present  Mental Status: He is alert and oriented to person, place, and time  Psychiatric:         Mood and Affect: Mood normal          Behavior: Behavior normal              Review of Systems   Constitutional: Negative for chills, fatigue and fever  HENT: Negative for congestion  Respiratory: Negative for cough, shortness of breath and wheezing  Cardiovascular: Negative for leg swelling  Gastrointestinal: Negative for abdominal pain, constipation, diarrhea, nausea and vomiting  Genitourinary: Positive for hematuria  Negative for dysuria     Musculoskeletal: Negative for back pain    Skin: Positive for wound  Neurological: Negative for dizziness and headaches  Psychiatric/Behavioral: Negative for agitation and confusion  All other systems reviewed and are negative  Scheduled Meds:  Current Facility-Administered Medications   Medication Dose Route Frequency Provider Last Rate    acetaminophen  650 mg Oral Q6H PRN Jocelyne Alejo MD      ALPRAZolam  0 25 mg Oral BID PRN Jocelyne Alejo MD      aspirin  81 mg Oral Daily Jocelyne Alejo MD      bismuth subsalicylate  306 mg Oral Q6H PRN Jocelyne Alejo MD      docusate sodium  100 mg Oral BID Jocelyne Alejo MD      fluticasone  1 spray Each Nare BID Jocelyne Alejo MD      gabapentin  600 mg Oral Daily Jocelyne Alejo MD      heparin (porcine)  5,000 Units Subcutaneous [facility] Jocelyne Alejo MD      insulin glargine  10 Units Subcutaneous HS Jocelyne Alejo MD      insulin lispro  1-6 Units Subcutaneous TID [facility] Jocelyne Alejo MD      isosorbide mononitrate  30 mg Oral Daily Jocelyne Alejo MD      latanoprost  1 drop Both Eyes HS Jocelyne Alejo MD      metoprolol succinate  100 mg Oral Daily Jocelyne Alejo MD      oxyCODONE  5 mg Oral Q4H PRN Jocelyne Alejo MD      pantoprazole  40 mg Oral Early Morning Jocelyne Alejo MD      sertraline  50 mg Oral Daily Jocelyne Alejo MD      tamsulosin  0 4 mg Oral HS Jocelyne Alejo MD      zolpidem  5 mg Oral HS PRN Jocelyne Alejo MD         PRN Meds:   acetaminophen    ALPRAZolam    bismuth subsalicylate    oxyCODONE    zolpidem    Continuous Infusions:       Invasive Devices:      Invasive Devices     Peripheral Intravenous Line            Peripheral IV 02/07/21 Left Antecubital 1 day                  LABORATORY:    Results from last 7 days   Lab Units 02/09/21  0806 02/09/21  0458 02/08/21  1616 02/08/21  0611 02/07/21  1458 02/04/21  1304   WBC Thousand/uL 7 78  --   --  6 74  --  9 11   HEMOGLOBIN g/dL 9 5*  --  9 2* 10 4*  --  10 4*   HEMATOCRIT % 28 4*  --  28 0* 31 4*  --  32 6*   PLATELETS Thousands/uL 154  --   --  164 203 175   POTASSIUM mmol/L  --  4 5 5 4* 4 5  --  4 9   CHLORIDE mmol/L  --  106 106 106  --  109*   CO2 mmol/L  --  23 26 24  --  26   BUN mg/dL  --  33* 35* 38*  --  50*   CREATININE mg/dL  --  1 96* 2 02* 2 03*  --  2 45*   CALCIUM mg/dL  --  7 9* 7 9* 8 3  --  9 4      rest all reviewed    RADIOLOGY:  IR lower extremity angiogram    (Results Pending)   XR foot 3+ vw left    (Results Pending)   IR lower extremity angiogram    (Results Pending)     Rest all reviewed    Portions of the record may have been created with voice recognition software  Occasional wrong word or "sound a like" substitutions may have occurred due to the inherent limitations of voice recognition software  Read the chart carefully and recognize, using context, where substitutions have occurred  If you have any questions, please contact the dictating provider

## 2021-02-09 NOTE — PLAN OF CARE
Problem: Potential for Falls  Goal: Patient will remain free of falls  Description: INTERVENTIONS:  - Assess patient frequently for physical needs  -  Identify cognitive and physical deficits and behaviors that affect risk of falls    -  [place] fall precautions as indicated by assessment   - Educate patient/family on patient safety including physical limitations  - Instruct patient to call for assistance with activity based on assessment  - Modify environment to reduce risk of injury  - Consider OT/PT consult to assist with strengthening/mobility  Outcome: Progressing

## 2021-02-09 NOTE — OP NOTE
OPERATIVE REPORT  PATIENT NAME: Susan Hines    :  1945  MRN: [id number]  Pt Location: [place] [address]    SURGERY DATE: 2021    Surgeon(s) and Role:     * Georgette Mejia MD - Primary    Preop Diagnosis:  PAD (peripheral artery disease) ([facility] [address] ) [I73 9] with non-healing left 5th ray amputation site    Post-Op Diagnosis Codes:     * PAD (peripheral artery disease) ([facility] [address] ) [I73 9] with non-healing left 5th ray amputation site    Procedure:   1  Ultrasound-guided right common Femoral Artery Access  2  Aortogram with pelvic runoff with CO2 and contrast  3  Left Lower extremity Runoff with CO2 and contrast  4  Selective greater than third order catheterization of left posterior tibial artery  5  Percutaneous transluminal angioplasty of left posterior tibial artery with 2x40mm [place] balloon  6  Retrograde right femoral angiogram  7  Closure of right femoral artery access site with Mynx closure device  8  Supervision and radiologic interpretation of all radiographic imaging    Surgeon: Georgette Mejia MD  Assistant (s): none  Anesthesia : Conscious sedation by anesthesia  Findings: see below,   Comp: None  EBL: Minimal  Specimen: none  Drains: None  Access: right femoral ARTERY    Indications:    Mr Ivan Petty is a 64yo male with PAD and poor healing of a left 5th ray amputation  Plan for left lower extremity angiogram with possible intervention  All risks and benefits of the procedure were discussed with the patient and informed consent was obtained  RADIOGRAPHIC SUPERVISION AND INTERPRETATION OF TEST:    1  Abdominal aortogram findings -   Abdominal aorta - patent  Inferior mesenteric artery - patent    2  Pelvic runoff findings -   Left common iliac artery-patent  Left internal iliac artery-patent  Left external iliac artery-patent    Right common iliac artery-patent  Right internal iliac artery-patent  Right external iliac artery-patent    2    Left lower extremity angiogram findings-    common femoral artery- patent  Profunda femorals artery- patent  Superficial femoral artery-patent  Popliteal artery-patent (no significant stenoses identified after multiple projections performed in setting of knee prosthesis)  Anterior tibial artery-proximal occlusion just distal to takeoff, faint areas of intermittent reconstitution throughout, possible faint filling at the foot  Tibioperoneal trunk-patent, possible mild stenosis at the oriign only identified in steep PENA projection  Posterior tibial artery-patent with high grade focal stenosis at the mid-distal segment  Peroneal artery-patent  Plantar arch-incomplete    3  Right lower extremity angiogram findings-    common femoral artery- patent  Profunda femorals artery- patent  Superficial femoral artery-patent proximally      Contrast Type/Amount -  72 CC VISIPAQUE 320    Fluoro Time (Mandatory) -  32 2 MIN    Devices - MYNX       PLAN:    Multiple challenges were encountered during the procedure  Limited balloons/devices were available from right groin access given height of patient  Significant technical difficulties with poor visualization of imaging were encountered with malfunctioning hardware in the hybrid room  Finally, patient also had abdominal/pelvic cramping with a significant bowel movement during the procedure  Minimal response to angioplasty of focal left posterior tibial artery lesion was noted  Would benefit from repeat attempt at tibial angioplasty, possible tibial atherectomy, via either antegrade vs  Retrograde access  Could also consider attempt at anterior tibial artery recanalization in setting of nonhealing amputation site  Indication: H& P reviewed and imaging reviewed per EMR  Procedure: The patient was brought to the hybrid OR suite  After the placement of monitoring devices, a timeout was performed and conscious sedation was administered  Both groins were prepped and draped in normal sterile fashion   Local anesthesia was infiltrated in the skin and subcutaneous tissues of the right groin and the right femoral artery was cannulated with a 21G micropuncture needle under ultrasound guidance  Wire was advanced under imaging  After obtaining pulsatile flow, a micro guidewire was inserted through the needle and the access site was enlarged with a #11 scalpel  A micropuncture sheath was placed and angiogram performed to confirm adequate access site above the femoral bifurcation and below the external iliac artery  Hard copy image was stored in PACS  A 5 English sheath was advanced over the wire and the wire removed  A [employer] wire was advanced into the abdominal aorta and an aortogram was performed utilizing the Omni Flush Catheter with CO2  Images were unfortunately poor with CO2  Additional imaging was performed with hand injection using contrast  Run-off films were obtained by selecting the iliac system using the Omni flush catheter  Selecting the left iliac artery was challenging due to tortuosity and poor imaging/difficult visualization  It was ultimately selected using a floppy glidewire and glidecatheter  Runoff images were performed with CO2 and limited contrast    The decision was made to intervene  A 5Fr 45cm sheath was exchanged over a stiff wire into the left iliofemoral vessels  The patient was heparinized with 7000 units of heparin  The SFA and popliteal artery were crossed with a floppy glidewire and glide catheter and angiogram confirmed we were in the lumen  Additional contrast imaging was performed of the popliteal segment in multiple projections given poor visualization with his left knee prosthesis  The posterior tibial artery was then crossed and a  018 command wire was advanced across the lesion with a  018 rubicon catheter  Repeat angiogram confirmed luminal access in the posterior tibial artery  The wire was exchanged for an  014 glide advantage wire  Percutaneous angioplasty with a 2mm monorail balloon was performed   Repeat angiogram did not reveal any significant improvement  Completion angiogram confirmed intact runoff to the foot  Given multiple challenges detailed above, the decision was made not to proceed with further interventions at this time  Next a short sheath was exchanged / placed over a wire and secured after flushing and aspirating  A retrograde femoral angiogram was performed confirming access above the femoral bifurcation and below the inguinal ligament  A Mynx closure device was utilized with good hemostasis  Patient tolerated procedure well; is going to PACU  At the end of the case, patient's bilateral feet were well perfused and had good capillary refill  He had no abdominal or suprapubic pain or tenderness to palpation  He had dopplerable DP/PT signals bilaterally  SIGNATURE: Jose Manuel Wayne MD  DATE: February 8, 2021  TIME: 7:43 PM      Vascular Quality Initiative - Peripheral Vascular Intervention     Urgency: Urgent    Functional Status:  Fully active; able to carry on all predisease activities without restriction  Ambulation: Amb = independently ambulatory    Leg Symptoms    Right: Non-healing Amputation        Tissue Loss Severity: Grade 1, Shallow      Infection: Grade 0, None   Left: Asymptomatic: documented peripheral arterial disease without symptoms of claudication or ischemic pain  Treatment of Native Artery to Maintain Bypass Patency?:  No    COVID Information  COVID Symptoms Pre-Procedure: Asymptomatic    Treatment Delayed by Pandemic: None    Access   Number of Sites: 1     Access Site 1:     Side 1: Right    Site 1: Femoral Retro to Antegrade    Access Guidance 1:U/S    Largest Sheath Size 1: 5 Fr      Closure Device 1: MynxGrip      Number of Closure Devices: 1     Closure Device Outcome: Closure device successful         Procedure  Fluoro Time: 32 2 minutes  Contrast Volume: Visipaque 72 ml  DAP: unknown Gy cm2  CO2: yes  Anticoagulant: Heparin  Protamine: No  If Creatinine is > 1 2 or missing, ROSSY Prophylaxis IV saline     Treatment Details  Indication: Occlusive Disease,  Number of Arteries Treated:  1   Completion Assessment  Artery 1 treated: Post Tibial  Left             Outflow: 2                  Was this Site previously treated?: No          TASC Grade: A          Total Treated Length: 40 mm          Total Occluded Length: 0 cm          Calcification: Mild (calcification on one side of artery > half length of lesion)          Number of Treatment types (Devices):   1           Device 1          Treatment Type: Plain Balloon            Concomitant: None          Technical result: Stenosis >30% or 10 mm Gradient  Interventional       Post Procedure  Discharge status: Other Hospital  Procedure Complications: No

## 2021-02-09 NOTE — CONSULTS
Consult - Urology   Susan Hines 1945, 66 y o  male MRN: [id number]    Unit/Bed#: E5 -01 Encounter: [id number]    Gross hematuria  Assessment & Plan  -Cystoscopy 11/19/2020 Normal urethra with predominantly bilobar prostatic hypertrophy and elevation of the median bar causing complete outflow obstruction   The ureteral orifices were orthotopic and largely unremarkable   The bladder was mildly trabeculated and erythematous especially on the posterior wall and left lateral wall   The right-side of the bladder was unremarkable  Cassandria Roque was no papillary lesion   Biopsies of the erythematous areas were performed and the erythematous areas cauterized      -send urinalysis and culture      -continue tamsulosin and Myrbetriq      -CREAT 1 96, he is at baseline      -hemoglobin 9 5      -will insert large-bore 3 way Shirley catheter and began CBI      -manual irrigation q 4 hours and p r n  For clots as ordered      -NPO after midnight      -case request for tomorrow for cystoscopy, clot evac, possible fulguration        Carcinoma in situ of bladder  Assessment & Plan  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  Oncology History:  1  TURBT (1994): pathology unavailable  2  Induction intravesical BCG (1994)  3  Induction intravesical BCG (1995)  4  TURBT (8/17/2016): TaHG + CIS  5  TURBT (12/13/2016): TaHG + CIS  6  Induction intravesical BCG (1/4/2017-2/08/2017)  7  TURBT (6/19/2017): TaHG  8  TURBT (4/26/2019): T1HG + CIS  9  Induction intravesical BCG (7/2019-) - received 4 treatments and stopped due to symptoms  10  TURBT (11/04/2019): TaHG   11  Induction intravesical BCG+INF (12/09/2019-1/28/2020)  12   Maintenance intravesical BCG+INF (6/09/2020-6/23/2020)  -status post cysto with biopsies, transurethral prostate biopsy and fulguration on 11/19/2020 by Dr Monse Moreira   -bladder biopsies negative 11/19/2020        Subjective:   Susan Hines is a 66 y o  male with history of CAD s/p cardiac stent, HLP, DM and stage 3 CKD admitted for LLE angiogram completed on 02/08/2021  He is s/p left 5th metatarsal resection and revision for left diabetic foot wound with delayed healing  urology was consulted for Gross hematuria since LLE  agram 02/08/2021, patient has a hx of bladder Ca tx w/ BCG  Citlalli Costa is followed at [facility] for carcinoma in situ the bladder  He was last seen by them on 11/04/2020  He has a history of bladder cancer unknown stage from [id number] that was treated with TURBT and 2 induction courses of BCG  He had reported following with Urology with annual endoscopic surveillance which was negative  He switched to [place] [facility] [facility] in 2016 and was found to have an abnormal cystoscopy followed up by a biopsy in August of 2016 which revealed CIS urothelial carcinoma of the bladder  He underwent a TURBT on 12/13/2016 which revealed papillary tumors that were completely resected (TaHG+CIS)  He received BCG from [id number]  He had a TURBT on 06/19/2017, TURBT on 04/26/2019 (T1HG + CIS)  He had 4 treatments of BCG starting July of 2019 but stopped due to symptoms  He had TURBT on 11/04/2019 (Julian Storey)  Had induction of intravesical BCG + INF 12/09/201-1/282020  He had maintenance intravesical BCG + INF 06/09/2020-06/23/2020  He is status post cysto with biopsies, transurethral prostate biopsy and fulguration on 11/19/2020 by Dr Lilian Leone  At that time he was noted to have complete outflow obstruction due to his prostate  He is receiving maintenance BCG treatments  He received his last BCG in June  He reports that he has been having more more symptoms after his maintenance BCG treatments  At the present time he has difficulty voiding with slow urinary stream, frequency, bladder pain and dysuria  He is getting up frequently at night to urinate  The voiding pain causes difficulty sleeping    gross hematuria noted  Will insert CBI with Q 4 hour manual irrigation    He has requested for 02/10/2021 for cystoscopy, clot evac, fulguration  Patient updated and agreeable to plan    Review of Systems   Constitutional: Negative for activity change, appetite change, chills, fatigue, fever and unexpected weight change  HENT: Negative for facial swelling  Eyes: Negative for discharge  Respiratory: Negative  Negative for cough and shortness of breath  Cardiovascular: Negative for chest pain and leg swelling  Gastrointestinal: Negative  Negative for abdominal distention, abdominal pain, constipation, diarrhea, nausea and vomiting  Endocrine: Negative  Genitourinary: Positive for dysuria and hematuria  Negative for decreased urine volume, difficulty urinating, enuresis, flank pain, frequency, genital sores and urgency  Had a few episodes of burning with urination since yesterday  Feels he empties bladder completely  Has nocturia every 2 hours which is unchanged  He feels his strain to void at times  He reports his urinary stream is not weak but is not strong  He feels like he empties his bladder  Musculoskeletal: Positive for back pain  Negative for myalgias  Chronic back pain due to spinal stenosis   Skin: Negative for pallor and rash  Allergic/Immunologic: Negative  Negative for immunocompromised state  Neurological: Negative for facial asymmetry and speech difficulty  Psychiatric/Behavioral: Negative for agitation and confusion  Objective:  Vitals: Blood pressure 116/58, pulse 75, temperature 98 1 °F (36 7 °C), temperature source Temporal, resp  rate 16, height 6' 4" (1 93 m), weight 110 kg (242 lb 8 1 oz), SpO2 97 %  ,Body mass index is 29 52 kg/m²  Physical Exam  Vitals signs and nursing note reviewed  Constitutional:       General: He is not in acute distress  Appearance: Normal appearance  He is not ill-appearing, toxic-appearing or diaphoretic  HENT:      Head: Normocephalic     Pulmonary:      Effort: Pulmonary effort is normal  No respiratory distress  Abdominal:      General: Abdomen is flat  There is no distension  Tenderness: There is no abdominal tenderness  There is no guarding  Genitourinary:     Comments: Uncircumcised phallus, orthotopic meatus, testicles smooth nontender  Musculoskeletal:         General: No swelling  Skin:     General: Skin is warm and dry  Neurological:      General: No focal deficit present  Mental Status: He is alert and oriented to person, place, and time     Psychiatric:         Mood and Affect: Mood normal          Behavior: Behavior normal            Labs:  Recent Labs     21  0611 21  0806   WBC 6 74 7 78     Recent Labs     21  0611 21  1616 21  0806   HGB 10 4* 9 2* 9 5*       Recent Labs     21  0611 21  1616 21  0458   CREATININE 2 03* 2 02* 1 96*         History:  Social History     Socioeconomic History    Marital status: /Civil Union     Spouse name: None    Number of children: None    Years of education: None    Highest education level: None   Occupational History    None   Social Needs    Financial resource strain: None    Food insecurity     Worry: None     Inability: None    Transportation needs     Medical: None     Non-medical: None   Tobacco Use    Smoking status: Former Smoker     Quit date: 1970     Years since quittin 1    Smokeless tobacco: Never Used   Substance and Sexual Activity    Alcohol use: Yes     Frequency: 2-3 times a week     Drinks per session: 1 or 2     Binge frequency: Never     Comment: beer / liquor    Drug use: Not Currently     Types: Marijuana     Comment: quit 2019 had medical marijuana    Sexual activity: Not Currently   Lifestyle    Physical activity     Days per week: None     Minutes per session: None    Stress: None   Relationships    Social connections     Talks on phone: None     Gets together: None     Attends Anabaptist service: None     Active member of club or organization: None     Attends meetings of clubs or organizations: None     Relationship status: None    Intimate partner violence     Fear of current or ex partner: None     Emotionally abused: None     Physically abused: None     Forced sexual activity: None   Other Topics Concern    None   Social History Narrative    Consumes 1 cup of coffee and 1 soda per day     Socioeconomic History    Marital status: /Civil Union     Spouse name: None    Number of children: None    Years of education: None    Highest education level: None   Occupational History    None   Social Needs    Financial resource strain: None    Food insecurity     Worry: None     Inability: None    Transportation needs     Medical: None     Non-medical: None   Tobacco Use    Smoking status: Former Smoker     Quit date:      Years since quittin 1    Smokeless tobacco: Never Used   Substance and Sexual Activity    Alcohol use: Yes     Frequency: 2-3 times a week     Drinks per session: 1 or 2     Binge frequency: Never     Comment: beer / liquor    Drug use: Not Currently     Types: Marijuana     Comment: quit 2019 had medical marijuana    Sexual activity: Not Currently   Lifestyle    Physical activity     Days per week: None     Minutes per session: None    Stress: None   Relationships    Social connections     Talks on phone: None     Gets together: None     Attends Congregational service: None     Active member of club or organization: None     Attends meetings of clubs or organizations: None     Relationship status: None    Intimate partner violence     Fear of current or ex partner: None     Emotionally abused: None     Physically abused: None     Forced sexual activity: None   Other Topics Concern    None   Social History Narrative    Consumes 1 cup of coffee and 1 soda per day      Diagnosis Date    Anemia     Last assessed: 17    Anxiety     Arteriosclerotic cardiovascular disease     Last assessed: 9/28/17    Arthritis     Bladder cancer (HCC)     bladder- had BCG treatments    Chronic kidney disease     CKD (chronic kidney disease) stage 4, GFR 15-29 ml/min (HCC)     Colon polyp     Coronary artery disease     7 stents    Depression     Diabetes mellitus (HCC)     IDDM    GERD (gastroesophageal reflux disease)     Glaucoma     History of fusion of cervical spine     Hyperlipidemia     Hypertension     Insomnia     Last assessed: 11/14/12    Loss of hearing     has hearing aids but usually does not wear them    Other seasonal allergic rhinitis     Last assessed: 2/10/16    [facility]AD [facility]peripheral artery disease) ([facility])     Shortness of breath     on exertion    Spinal stenosis of lumbar region     Transient cerebral ischemia     Uses walker     w/c for longer distances     Past Surgical History:   Procedure Laterality Date    CARDIAC SURGERY      Cath stent placement  Last assessed: 3/9/17  Interventional Catheterization    CHOLECYSTECTOMY      COLONOSCOPY      CYSTOSCOPY      Diagnostic w/biopsy  Anny Powers  Last assessed: 12/1/14    CYSTOURETHROSCOPY      w/cautery  Anny Powers    GASTRIC BYPASS      For morbid obesity w/Shaji-en-Y   Resolved: 11/17/09    INCISION AND DRAINAGE OF WOUND Right 2/26/2017    Procedure: INCISION AND DRAINAGE (I&D) EXTREMITY WITH APPLICATION OF GRAFT JACKET;  Surgeon: Raine Grayson DPM;  [facility];  Service:     INCISION AND DRAINAGE OF WOUND Right 4/25/2017    Procedure: INCISION AND DRAINAGE (I&D) EXTREMITY, APPLICATION OF GRAFT;  Surgeon: Raine Grayson DPM;  [facility];  Service:     IR BIOPSY OTHER  7/2/2020    IR TUNNELED CENTRAL LINE PLACEMENT  12/24/2020    JOINT REPLACEMENT      christofer knees replaced    CA AMPUTATION METATARSAL+TOE,SINGLE Left 12/21/2020    Procedure: RAY RESECTION FOOT;  Surgeon: Clay Everett DPM;  [facility];  Service: Podiatry    CA AMPUTATION METATARSAL+TOE,SINGLE Left 12/31/2020    Procedure: 5TH MET RESECTION;  Surgeon: Ernst Servin DPM;  [facility];  Service: [facility] N/A 8/16/2016    Procedure: [place];  Surgeon: Justo Avelar MD;  [facility];  Service: Urology    MT CYSTOURETHROSCOPY,FULGUR <0 5 CM CHRIS N/A 11/19/2020    Procedure: CYSTO W/BIOPSIES, transurethral prostate bx;  Surgeon: Karine Stevens MD;  [facility];  Service: Urology    MT [facility] 3RD+ ORD SLCTV ABDL PEL/LXTR [facility] Left 2/8/2021    Procedure: LEG angiogram, CO2 w/limited contrast with balloon angioplasty postertior tibial artery;  Surgeon: Sandi Donovan MD;  [facility];  Service: Vascular    ROTATOR CUFF REPAIR      SMALL INTESTINE SURGERY      Surgery Shaji-en-Y    SPINAL FUSION      lumbar and cervical fusions    VAC DRESSING APPLICATION Right 9/61/4499    Procedure: APPLICATION VAC DRESSING;  Surgeon: Ophelia Mars DPM;  [facility];  Service:      Family History   Problem Relation Age of Onset    Diabetes Mother     Heart disease Mother     Other Mother         High blood pressure    Heart disease Father     Diabetes Sister     Other Sister         High blood pressure    Kidney disease Sister     Heart disease Brother     Other Brother         High blood pressure       EVANGELINA Du  Date: 2/9/2021 Time: 9:42 AM

## 2021-02-09 NOTE — H&P (VIEW-ONLY)
Consult - Urology   Alena De Leon 1945, 66 y o  male MRN: [id number]    Unit/Bed#: E5 -01 Encounter: [id number]    Gross hematuria  Assessment & Plan  -Cystoscopy 11/19/2020 Normal urethra with predominantly bilobar prostatic hypertrophy and elevation of the median bar causing complete outflow obstruction   The ureteral orifices were orthotopic and largely unremarkable   The bladder was mildly trabeculated and erythematous especially on the posterior wall and left lateral wall   The right-side of the bladder was unremarkable  Caretha Donnell was no papillary lesion   Biopsies of the erythematous areas were performed and the erythematous areas cauterized      -send urinalysis and culture      -continue tamsulosin and Myrbetriq      -CREAT 1 96, he is at baseline      -hemoglobin 9 5      -will insert large-bore 3 way Shirley catheter and began CBI      -manual irrigation q 4 hours and p r n  For clots as ordered      -NPO after midnight      -case request for tomorrow for cystoscopy, clot evac, possible fulguration        Carcinoma in situ of bladder  Assessment & Plan  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  Oncology History:  1  TURBT (1994): pathology unavailable  2  Induction intravesical BCG (1994)  3  Induction intravesical BCG (1995)  4  TURBT (8/17/2016): TaHG + CIS  5  TURBT (12/13/2016): TaHG + CIS  6  Induction intravesical BCG (1/4/2017-2/08/2017)  7  TURBT (6/19/2017): TaHG  8  TURBT (4/26/2019): T1HG + CIS  9  Induction intravesical BCG (7/2019-) - received 4 treatments and stopped due to symptoms  10  TURBT (11/04/2019): TaHG   11  Induction intravesical BCG+INF (12/09/2019-1/28/2020)  12   Maintenance intravesical BCG+INF (6/09/2020-6/23/2020)  -status post cysto with biopsies, transurethral prostate biopsy and fulguration on 11/19/2020 by Dr Katerin Dumont   -bladder biopsies negative 11/19/2020        Subjective:   Alena De Leon is a 66 y o  male with history of CAD s/p cardiac stent, HLP, DM and stage 3 CKD admitted for LLE angiogram completed on 02/08/2021  He is s/p left 5th metatarsal resection and revision for left diabetic foot wound with delayed healing  urology was consulted for Gross hematuria since LLE  agram 02/08/2021, patient has a hx of bladder Ca tx w/ BCG  Bay Ron is followed at [facility] for carcinoma in situ the bladder  He was last seen by them on 11/04/2020  He has a history of bladder cancer unknown stage from 9422-8089 that was treated with TURBT and 2 induction courses of BCG  He had reported following with Urology with annual endoscopic surveillance which was negative  He switched to [place] [address] [facility] in 2016 and was found to have an abnormal cystoscopy followed up by a biopsy in August of 2016 which revealed CIS urothelial carcinoma of the bladder  He underwent a TURBT on 12/13/2016 which revealed papillary tumors that were completely resected (TaHG+CIS)  He received BCG from [id number]  He had a TURBT on 06/19/2017, TURBT on 04/26/2019 (T1HG + CIS)  He had 4 treatments of BCG starting July of 2019 but stopped due to symptoms  He had TURBT on 11/04/2019 (Audrey Hernandez)  Had induction of intravesical BCG + INF 12/09/201-1/282020  He had maintenance intravesical BCG + INF 06/09/2020-06/23/2020  He is status post cysto with biopsies, transurethral prostate biopsy and fulguration on 11/19/2020 by Dr Kaci Otto  At that time he was noted to have complete outflow obstruction due to his prostate  He is receiving maintenance BCG treatments  He received his last BCG in June  He reports that he has been having more more symptoms after his maintenance BCG treatments  At the present time he has difficulty voiding with slow urinary stream, frequency, bladder pain and dysuria  He is getting up frequently at night to urinate  The voiding pain causes difficulty sleeping    gross hematuria noted  Will insert CBI with Q 4 hour manual irrigation    He has requested for 02/10/2021 for cystoscopy, clot evac, fulguration  Patient updated and agreeable to plan    Review of Systems   Constitutional: Negative for activity change, appetite change, chills, fatigue, fever and unexpected weight change  HENT: Negative for facial swelling  Eyes: Negative for discharge  Respiratory: Negative  Negative for cough and shortness of breath  Cardiovascular: Negative for chest pain and leg swelling  Gastrointestinal: Negative  Negative for abdominal distention, abdominal pain, constipation, diarrhea, nausea and vomiting  Endocrine: Negative  Genitourinary: Positive for dysuria and hematuria  Negative for decreased urine volume, difficulty urinating, enuresis, flank pain, frequency, genital sores and urgency  Had a few episodes of burning with urination since yesterday  Feels he empties bladder completely  Has nocturia every 2 hours which is unchanged  He feels his strain to void at times  He reports his urinary stream is not weak but is not strong  He feels like he empties his bladder  Musculoskeletal: Positive for back pain  Negative for myalgias  Chronic back pain due to spinal stenosis   Skin: Negative for pallor and rash  Allergic/Immunologic: Negative  Negative for immunocompromised state  Neurological: Negative for facial asymmetry and speech difficulty  Psychiatric/Behavioral: Negative for agitation and confusion  Objective:  Vitals: Blood pressure 116/58, pulse 75, temperature 98 1 °F (36 7 °C), temperature source Temporal, resp  rate 16, height 6' 4" (1 93 m), weight 110 kg (242 lb 8 1 oz), SpO2 97 %  ,Body mass index is 29 52 kg/m²  Physical Exam  Vitals signs and nursing note reviewed  Constitutional:       General: He is not in acute distress  Appearance: Normal appearance  He is not ill-appearing, toxic-appearing or diaphoretic  HENT:      Head: Normocephalic     Pulmonary:      Effort: Pulmonary effort is normal  No respiratory distress  Abdominal:      General: Abdomen is flat  There is no distension  Tenderness: There is no abdominal tenderness  There is no guarding  Genitourinary:     Comments: Uncircumcised phallus, orthotopic meatus, testicles smooth nontender  Musculoskeletal:         General: No swelling  Skin:     General: Skin is warm and dry  Neurological:      General: No focal deficit present  Mental Status: He is alert and oriented to person, place, and time     Psychiatric:         Mood and Affect: Mood normal          Behavior: Behavior normal            Labs:  Recent Labs     21  0611 21  0806   WBC 6 74 7 78     Recent Labs     21  0611 21  1616 21  0806   HGB 10 4* 9 2* 9 5*       Recent Labs     21  0611 21  1616 21  0458   CREATININE 2 03* 2 02* 1 96*         History:  Social History     Socioeconomic History    Marital status: /Civil Union     Spouse name: None    Number of children: None    Years of education: None    Highest education level: None   Occupational History    None   Social Needs    Financial resource strain: None    Food insecurity     Worry: None     Inability: None    Transportation needs     Medical: None     Non-medical: None   Tobacco Use    Smoking status: Former Smoker     Quit date: 1970     Years since quittin 1    Smokeless tobacco: Never Used   Substance and Sexual Activity    Alcohol use: Yes     Frequency: 2-3 times a week     Drinks per session: 1 or 2     Binge frequency: Never     Comment: beer / liquor    Drug use: Not Currently     Types: Marijuana     Comment: quit 2019 had medical marijuana    Sexual activity: Not Currently   Lifestyle    Physical activity     Days per week: None     Minutes per session: None    Stress: None   Relationships    Social connections     Talks on phone: None     Gets together: None     Attends Confucianism service: None     Active member of club or organization: None     Attends meetings of clubs or organizations: None     Relationship status: None    Intimate partner violence     Fear of current or ex partner: None     Emotionally abused: None     Physically abused: None     Forced sexual activity: None   Other Topics Concern    None   Social History Narrative    Consumes 1 cup of coffee and 1 soda per day     Socioeconomic History    Marital status: /Civil Union     Spouse name: None    Number of children: None    Years of education: None    Highest education level: None   Occupational History    None   Social Needs    Financial resource strain: None    Food insecurity     Worry: None     Inability: None    Transportation needs     Medical: None     Non-medical: None   Tobacco Use    Smoking status: Former Smoker     Quit date:      Years since quittin 1    Smokeless tobacco: Never Used   Substance and Sexual Activity    Alcohol use: Yes     Frequency: 2-3 times a week     Drinks per session: 1 or 2     Binge frequency: Never     Comment: beer / liquor    Drug use: Not Currently     Types: Marijuana     Comment: quit 2019 had medical marijuana    Sexual activity: Not Currently   Lifestyle    Physical activity     Days per week: None     Minutes per session: None    Stress: None   Relationships    Social connections     Talks on phone: None     Gets together: None     Attends [employer] service: None     Active member of club or organization: None     Attends meetings of clubs or organizations: None     Relationship status: None    Intimate partner violence     Fear of current or ex partner: None     Emotionally abused: None     Physically abused: None     Forced sexual activity: None   Other Topics Concern    None   Social History Narrative    Consumes 1 cup of coffee and 1 soda per day      Diagnosis Date    Anemia     Last assessed: 17    Anxiety     Arteriosclerotic cardiovascular disease     Last assessed: 9/28/17    Arthritis     Bladder cancer (HCC)     bladder- had BCG treatments    Chronic kidney disease     CKD (chronic kidney disease) stage 4, GFR 15-29 ml/min (HCC)     Colon polyp     Coronary artery disease     7 stents    Depression     Diabetes mellitus (HCC)     IDDM    GERD (gastroesophageal reflux disease)     Glaucoma     History of fusion of cervical spine     Hyperlipidemia     Hypertension     Insomnia     Last assessed: 11/14/12    Loss of hearing     has hearing aids but usually does not wear them    Other seasonal allergic rhinitis     Last assessed: 2/10/16    PAD (peripheral artery disease) ([facility])     Shortness of breath     on exertion    Spinal stenosis of lumbar region     Transient cerebral ischemia     Uses walker     w/c for longer distances     Past Surgical History:   Procedure Laterality Date    CARDIAC SURGERY      Cath stent placement  Last assessed: 3/9/17  Interventional Catheterization    CHOLECYSTECTOMY      COLONOSCOPY      CYSTOSCOPY      Diagnostic w/biopsy  Vidya Shaji  Last assessed: 12/1/14    CYSTOURETHROSCOPY      w/cautery  Vidya Shaji    GASTRIC BYPASS      For morbid obesity w/Shaji-en-Y   Resolved: 11/17/09    INCISION AND DRAINAGE OF WOUND Right 2/26/2017    Procedure: INCISION AND DRAINAGE (I&D) EXTREMITY WITH APPLICATION OF GRAFT JACKET;  Surgeon: Ambrocio Oviedo DPM;  [facility];  Service:     INCISION AND DRAINAGE OF WOUND Right 4/25/2017    Procedure: INCISION AND DRAINAGE (I&D) EXTREMITY, APPLICATION OF GRAFT;  Surgeon: Ambrocio Oviedo DPM;  [facility];  Service:     IR BIOPSY OTHER  7/2/2020    IR TUNNELED CENTRAL LINE PLACEMENT  12/24/2020    JOINT REPLACEMENT      christofer knees replaced    NJ AMPUTATION METATARSAL+TOE,SINGLE Left 12/21/2020    Procedure: RAY RESECTION FOOT;  Surgeon: Tyson Park DPM;  [facility];  Service: Podiatry    NJ AMPUTATION METATARSAL+TOE,SINGLE Left 12/31/2020    Procedure: 5TH MET RESECTION;  Surgeon: Jaspreet Cortez DPM;  [facility];  Service: [facility] N/A 8/16/2016    Procedure: Conard Councilman;  Surgeon: Jenifer Denney MD;  [facility];  Service: Urology    DC CYSTOURETHROSCOPY,FULGUR <0 5 CM LESN N/A 11/19/2020    Procedure: CYSTO W/BIOPSIES, transurethral prostate bx;  Surgeon: Pelon Qureshi MD;  [facility];  Service: Urology    DC Pilar Moat 3RD+ ORD SLCTV ABDL PEL/LXTR [facility] Left 2/8/2021    Procedure: LEG angiogram, CO2 w/limited contrast with balloon angioplasty postertior tibial artery;  Surgeon: Temi Vila MD;  [facility];  Service: Vascular    ROTATOR CUFF REPAIR      SMALL INTESTINE SURGERY      Surgery Shaji-en-Y    SPINAL FUSION      lumbar and cervical fusions    VAC DRESSING APPLICATION Right 0/44/6240    Procedure: APPLICATION VAC DRESSING;  Surgeon: Hannah Montelongo DPM;  [facility];  Service:      Family History   Problem Relation Age of Onset    Diabetes Mother     Heart disease Mother     Other Mother         High blood pressure    Heart disease Father     Diabetes Sister     Other Sister         High blood pressure    Kidney disease Sister     Heart disease Brother     Other Brother         High blood pressure       EVANGELINA Vu  Date: 2/9/2021 Time: 9:42 AM

## 2021-02-09 NOTE — QUICK NOTE
Universal Protocol:  Consent: Verbal consent obtained  Written consent not obtained  Risks and benefits: risks, benefits and alternatives were discussed  Consent given by: patient  Time out: Immediately prior to procedure a "time out" was called to verify the correct patient, procedure, equipment, support staff and site/side marked as required  Patient understanding: patient states understanding of the procedure being performed  Patient identity confirmed: verbally with patient      Bladder catheterization    Date/Time: 2/9/2021 12:30 PM  Performed by: EVANGELINA Martinez  Authorized by: EVANGELINA Martinez     Patient location:  Bedside  Consent:     Consent given by:  Patient  Universal protocol:     Procedure explained and questions answered to patient or proxy's satisfaction: yes      Immediately prior to procedure a time out was called: yes      Patient identity confirmed:  Verbally with patient  Pre-procedure details:     Procedure purpose:  Therapeutic    Preparation: Patient was prepped and draped in usual sterile fashion    Anesthesia (see MAR for exact dosages): Anesthesia method:  Topical application    Topical anesthetic:  Lidocaine gel  Procedure details:     Bladder irrigation: yes      Catheter insertion:  Indwelling    Approach: natural orifice      Catheter type:  Triple lumen    Catheter size:  24 Fr    Number of attempts:  1    Successful placement: yes      Urine characteristics:  Bloody  Post-procedure details:     Patient tolerance of procedure: Tolerated well, no immediate complications  Comments:      [de-identified] Upper sorbian Shirley catheter inserted without difficulty  Patient tolerated procedure well  Had immediate return of dark wine colored urine  Hand irrigated with 240 mL of sterile water with no return of clot  CBI initiated and titrated to keep urine a light pink color

## 2021-02-09 NOTE — ASSESSMENT & PLAN NOTE
-Gross hematuria, managed with 24 Fr Wagner catheter manual irrigation and CBI 02/09/2021  -POD#2 cystoscopy fulguration of some bleeding areas within the prostate, no visual recurrence of bladder tumor; small clot evacuated     -Urine clear yellow postop, on slow CBI at present  -H&H stable  -urine clear yellow  -wagner discontinued  -reports dysuria  -urine culture pending  -can try pyridium for discomfort  -neosporin to tip of penis for comfort

## 2021-02-09 NOTE — PROGRESS NOTES
Progress Note - Vascular Surgery   Vanita Maypearl 66 y o  male MRN: [id number]  Unit/Bed#: E5 -01 Encounter: [id number]      Subjective:  Patient seen and examined at bedside  Gross hematuria without clots started after procedure yesterday  Pain unchanged from preintervention   Denies nausea, vomiting, fever, chills        Vitals:  /79 (BP Location: Right arm)   Pulse 72   Temp 98 1 °F (36 7 °C) (Temporal)   Resp 18   Ht 6' 4" (1 93 m)   Wt 110 kg (242 lb 8 1 oz)   SpO2 98%   BMI 29 52 kg/m²     I/Os:  I/O last 3 completed shifts: In: 2800 [P O :720; I V :2080]  Out: 2600 [Urine:2600]  No intake/output data recorded  Lab Results and Cultures:   CBC with diff:   Lab Results   Component Value Date    WBC 6 74 02/08/2021    HGB 9 2 (L) 02/08/2021    HCT 28 0 (L) 02/08/2021    MCV 93 02/08/2021     02/08/2021    MCH 30 7 02/08/2021    MCHC 33 1 02/08/2021    RDW 13 2 02/08/2021    MPV 10 7 02/08/2021    NRBC 0 02/04/2021   ,   BMP/CMP:  Lab Results   Component Value Date    SODIUM 138 02/09/2021    K 4 5 02/09/2021    K 4 5 09/03/2015     02/09/2021     09/03/2015    CO2 23 02/09/2021    CO2 28 6 09/03/2015    ANIONGAP 5 09/03/2015    BUN 33 (H) 02/09/2021    BUN 19 09/03/2015    CREATININE 1 96 (H) 02/09/2021    CREATININE 1 37 (H) 09/03/2015    GLUCOSE 203 (H) 09/03/2015    CALCIUM 7 9 (L) 02/09/2021    CALCIUM 9 0 09/03/2015    AST 24 02/04/2021    AST 56 (H) 07/26/2015    ALT 47 02/04/2021    ALT 84 (H) 07/26/2015    ALKPHOS 122 (H) 02/04/2021    ALKPHOS 93 07/26/2015    PROT 6 4 07/26/2015    BILITOT 0 50 07/26/2015    EGFR 37 02/09/2021   ,   Lipid Panel:   Lab Results   Component Value Date    CHOL 139 03/17/2015   ,   Coags:   Lab Results   Component Value Date    PTT 30 09/19/2018    PTT 32 09/03/2015    INR 1 06 02/08/2021    INR 0 93 09/03/2015   ,     Blood Culture:   Lab Results   Component Value Date    BLOODCX No Growth After 5 Days   12/21/2020   ,   Urinalysis: Lab Results   Component Value Date    COLORU Yellow 08/17/2020    COLORU Yellow 07/18/2016    CLARITYU Cloudy 08/17/2020    CLARITYU Transparent 07/18/2016    SPECGRAV 1 018 08/17/2020    SPECGRAV 1 020 07/18/2016    PHUR 6 0 08/17/2020    PHUR 5 5 06/29/2020    PHUR 6 0 07/18/2016    LEUKOCYTESUR Large (A) 08/17/2020    LEUKOCYTESUR - 07/18/2016    NITRITE Negative 08/17/2020    NITRITE - 07/18/2016    PROTEINUA - 07/18/2016    GLUCOSEU Negative 08/17/2020    GLUCOSEU 500 (1/2%) (A) 09/24/2015    KETONESU Negative 08/17/2020    KETONESU - 07/18/2016    BILIRUBINUR Negative 08/17/2020    BILIRUBINUR - 01/18/2016    BLOODU Moderate (A) 08/17/2020    BLOODU trace 07/18/2016   ,   Urine Culture:   Lab Results   Component Value Date    URINECX No Growth <1000 cfu/mL 11/16/2020   ,   Wound Culure:   Lab Results   Component Value Date    WOUNDCULT 1+ Growth of Staphylococcus aureus (A) 12/21/2020    WOUNDCULT Few Colonies of Enterobacter aerogenes (A) 12/21/2020       Medications:  Current Facility-Administered Medications   Medication Dose Route Frequency    acetaminophen (TYLENOL) tablet 650 mg  650 mg Oral Q6H PRN    ALPRAZolam (XANAX) tablet 0 25 mg  0 25 mg Oral BID PRN    aspirin chewable tablet 81 mg  81 mg Oral Daily    bismuth subsalicylate (PEPTO BISMOL) oral suspension 524 mg  524 mg Oral Q6H PRN    docusate sodium (COLACE) capsule 100 mg  100 mg Oral BID    fluticasone (FLONASE) 50 mcg/act nasal spray 1 spray  1 spray Each Nare BID    gabapentin (NEURONTIN) capsule 600 mg  600 mg Oral Daily    heparin (porcine) subcutaneous injection 5,000 Units  5,000 Units Subcutaneous Q8H [address]    insulin glargine (LANTUS) subcutaneous injection 10 Units 0 1 mL  10 Units Subcutaneous HS    insulin lispro (HumaLOG) 100 units/mL subcutaneous injection 1-6 Units  1-6 Units Subcutaneous TID AC    isosorbide mononitrate (IMDUR) 24 hr tablet 30 mg  30 mg Oral Daily    latanoprost (XALATAN) 0 005 % ophthalmic solution 1 drop  1 drop Both Eyes HS    metoprolol succinate (TOPROL-XL) 24 hr tablet 100 mg  100 mg Oral Daily    oxyCODONE (ROXICODONE) IR tablet 5 mg  5 mg Oral Q4H PRN    pantoprazole (PROTONIX) EC tablet 40 mg  40 mg Oral Early Morning    sertraline (ZOLOFT) tablet 50 mg  50 mg Oral Daily    tamsulosin (FLOMAX) capsule 0 4 mg  0 4 mg Oral HS    zolpidem (AMBIEN) tablet 5 mg  5 mg Oral HS PRN           Physical Exam:      General: NAD  Head: normocephalic, atraumatic  CV: Pulse regular  Lungs: no conversational dyspnea  Abdomen: soft, non distended, non tender, no guarding or rebound  Extremities: CROCKER, motor sensory intact, Right groin access site without swelling  Palpable R fem  Dopp right DP/PT   Palpable L fem, dopp L PT, (DP covered by dressing c/d/i  Neuro: awake, alert, answers questions appropriately      Assessment:  75M s/p agram with angioplasty of left PT    Plan:  -diet as tolerated   -f/u hematuria/Hg  -consider urology evaluation  -patient will require IR intervention for retrograde access but will require anticoagulation for this intervention and hematuria must be resolved  -serial NV checks   -continue Asprin 81 mg    Sawyer Ritter DO  2/9/2021

## 2021-02-09 NOTE — ASSESSMENT & PLAN NOTE
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  Oncology History:  1  TURBT (1994): pathology unavailable  2  Induction intravesical BCG (1994)  3  Induction intravesical BCG (1995)  4  TURBT (8/17/2016): TaHG + CIS  5  TURBT (12/13/2016): TaHG + CIS  6  Induction intravesical BCG (1/4/2017-2/08/2017)  7  TURBT (6/19/2017): TaHG  8  TURBT (4/26/2019): T1HG + CIS  9  Induction intravesical BCG (7/2019-) - received 4 treatments and stopped due to symptoms  10  TURBT (11/04/2019): TaHG   11  Induction intravesical BCG+INF (12/09/2019-1/28/2020)  12   Maintenance intravesical BCG+INF (6/09/2020-6/23/2020)  -status post cysto with biopsies, transurethral prostate biopsy and fulguration on 11/19/2020 by Dr Sandra Lincoln   -bladder biopsies negative 11/19/2020

## 2021-02-10 ENCOUNTER — ANESTHESIA (INPATIENT)
Dept: PERIOP | Facility: HOSPITAL | Age: 76
DRG: 271 | End: 2021-02-10
Payer: MEDICARE

## 2021-02-10 ENCOUNTER — ANESTHESIA EVENT (INPATIENT)
Dept: PERIOP | Facility: HOSPITAL | Age: 76
DRG: 271 | End: 2021-02-10
Payer: MEDICARE

## 2021-02-10 VITALS — HEART RATE: 74 BPM

## 2021-02-10 LAB
ANION GAP SERPL CALCULATED.3IONS-SCNC: 9 MMOL/L (ref 4–13)
BACTERIA UR CULT: ABNORMAL
BUN SERPL-MCNC: 32 MG/DL (ref 5–25)
CALCIUM SERPL-MCNC: 8.4 MG/DL (ref 8.3–10.1)
CHLORIDE SERPL-SCNC: 107 MMOL/L (ref 100–108)
CO2 SERPL-SCNC: 23 MMOL/L (ref 21–32)
CREAT SERPL-MCNC: 2.05 MG/DL (ref 0.6–1.3)
ERYTHROCYTE [DISTWIDTH] IN BLOOD BY AUTOMATED COUNT: 13.2 % (ref 11.6–15.1)
GFR SERPL CREATININE-BSD FRML MDRD: 35 ML/MIN/1.73SQ M
GLUCOSE SERPL-MCNC: 106 MG/DL (ref 65–140)
GLUCOSE SERPL-MCNC: 113 MG/DL (ref 65–140)
GLUCOSE SERPL-MCNC: 163 MG/DL (ref 65–140)
GLUCOSE SERPL-MCNC: 303 MG/DL (ref 65–140)
GLUCOSE SERPL-MCNC: 96 MG/DL (ref 65–140)
HCT VFR BLD AUTO: 27.3 % (ref 36.5–49.3)
HGB BLD-MCNC: 9.1 G/DL (ref 12–17)
MCH RBC QN AUTO: 30.8 PG (ref 26.8–34.3)
MCHC RBC AUTO-ENTMCNC: 33.3 G/DL (ref 31.4–37.4)
MCV RBC AUTO: 93 FL (ref 82–98)
PLATELET # BLD AUTO: 157 THOUSANDS/UL (ref 149–390)
PMV BLD AUTO: 10.6 FL (ref 8.9–12.7)
POTASSIUM SERPL-SCNC: 4.3 MMOL/L (ref 3.5–5.3)
RBC # BLD AUTO: 2.95 MILLION/UL (ref 3.88–5.62)
SODIUM SERPL-SCNC: 139 MMOL/L (ref 136–145)
WBC # BLD AUTO: 7.24 THOUSAND/UL (ref 4.31–10.16)

## 2021-02-10 PROCEDURE — 0W3R8ZZ CONTROL BLEEDING IN GENITOURINARY TRACT, VIA NATURAL OR ARTIFICIAL OPENING ENDOSCOPIC: ICD-10-PCS | Performed by: UROLOGY

## 2021-02-10 PROCEDURE — 52214 CYSTOSCOPY AND TREATMENT: CPT | Performed by: UROLOGY

## 2021-02-10 PROCEDURE — 82948 REAGENT STRIP/BLOOD GLUCOSE: CPT

## 2021-02-10 PROCEDURE — 0TCB8ZZ EXTIRPATION OF MATTER FROM BLADDER, VIA NATURAL OR ARTIFICIAL OPENING ENDOSCOPIC: ICD-10-PCS | Performed by: UROLOGY

## 2021-02-10 PROCEDURE — 99024 POSTOP FOLLOW-UP VISIT: CPT | Performed by: PHYSICIAN ASSISTANT

## 2021-02-10 PROCEDURE — 85027 COMPLETE CBC AUTOMATED: CPT | Performed by: SURGERY

## 2021-02-10 PROCEDURE — 80048 BASIC METABOLIC PNL TOTAL CA: CPT | Performed by: INTERNAL MEDICINE

## 2021-02-10 PROCEDURE — 99232 SBSQ HOSP IP/OBS MODERATE 35: CPT | Performed by: SURGERY

## 2021-02-10 PROCEDURE — 99233 SBSQ HOSP IP/OBS HIGH 50: CPT | Performed by: INTERNAL MEDICINE

## 2021-02-10 RX ORDER — CEFAZOLIN SODIUM 2 G/50ML
2000 SOLUTION INTRAVENOUS EVERY 8 HOURS
Status: DISCONTINUED | OUTPATIENT
Start: 2021-02-10 | End: 2021-02-16

## 2021-02-10 RX ORDER — LIDOCAINE HYDROCHLORIDE 10 MG/ML
INJECTION, SOLUTION EPIDURAL; INFILTRATION; INTRACAUDAL; PERINEURAL AS NEEDED
Status: DISCONTINUED | OUTPATIENT
Start: 2021-02-10 | End: 2021-02-10

## 2021-02-10 RX ORDER — PROPOFOL 10 MG/ML
INJECTION, EMULSION INTRAVENOUS AS NEEDED
Status: DISCONTINUED | OUTPATIENT
Start: 2021-02-10 | End: 2021-02-10

## 2021-02-10 RX ORDER — ONDANSETRON 2 MG/ML
INJECTION INTRAMUSCULAR; INTRAVENOUS AS NEEDED
Status: DISCONTINUED | OUTPATIENT
Start: 2021-02-10 | End: 2021-02-10

## 2021-02-10 RX ORDER — FENTANYL CITRATE 50 UG/ML
INJECTION, SOLUTION INTRAMUSCULAR; INTRAVENOUS AS NEEDED
Status: DISCONTINUED | OUTPATIENT
Start: 2021-02-10 | End: 2021-02-10

## 2021-02-10 RX ORDER — FENTANYL CITRATE/PF 50 MCG/ML
50 SYRINGE (ML) INJECTION
Status: DISCONTINUED | OUTPATIENT
Start: 2021-02-10 | End: 2021-02-10 | Stop reason: HOSPADM

## 2021-02-10 RX ORDER — SODIUM CHLORIDE, SODIUM GLUCONATE, SODIUM ACETATE, POTASSIUM CHLORIDE, MAGNESIUM CHLORIDE, SODIUM PHOSPHATE, DIBASIC, AND POTASSIUM PHOSPHATE .53; .5; .37; .037; .03; .012; .00082 G/100ML; G/100ML; G/100ML; G/100ML; G/100ML; G/100ML; G/100ML
75 INJECTION, SOLUTION INTRAVENOUS CONTINUOUS
Status: DISCONTINUED | OUTPATIENT
Start: 2021-02-11 | End: 2021-02-12

## 2021-02-10 RX ORDER — ONDANSETRON 2 MG/ML
4 INJECTION INTRAMUSCULAR; INTRAVENOUS ONCE AS NEEDED
Status: DISCONTINUED | OUTPATIENT
Start: 2021-02-10 | End: 2021-02-10 | Stop reason: HOSPADM

## 2021-02-10 RX ORDER — SORBITOL 30 G/1000ML
IRRIGANT IRRIGATION AS NEEDED
Status: DISCONTINUED | OUTPATIENT
Start: 2021-02-10 | End: 2021-02-10 | Stop reason: HOSPADM

## 2021-02-10 RX ORDER — POLYETHYLENE GLYCOL 3350 17 G/17G
17 POWDER, FOR SOLUTION ORAL DAILY
Status: DISCONTINUED | OUTPATIENT
Start: 2021-02-11 | End: 2021-02-17 | Stop reason: HOSPADM

## 2021-02-10 RX ORDER — HYDROMORPHONE HCL/PF 1 MG/ML
0.5 SYRINGE (ML) INJECTION
Status: DISCONTINUED | OUTPATIENT
Start: 2021-02-10 | End: 2021-02-10 | Stop reason: HOSPADM

## 2021-02-10 RX ORDER — MEPERIDINE HYDROCHLORIDE 25 MG/ML
12.5 INJECTION INTRAMUSCULAR; INTRAVENOUS; SUBCUTANEOUS ONCE AS NEEDED
Status: DISCONTINUED | OUTPATIENT
Start: 2021-02-10 | End: 2021-02-10 | Stop reason: HOSPADM

## 2021-02-10 RX ORDER — EPHEDRINE SULFATE 50 MG/ML
INJECTION INTRAVENOUS AS NEEDED
Status: DISCONTINUED | OUTPATIENT
Start: 2021-02-10 | End: 2021-02-10

## 2021-02-10 RX ORDER — PROMETHAZINE HYDROCHLORIDE 25 MG/ML
6.25 INJECTION, SOLUTION INTRAMUSCULAR; INTRAVENOUS ONCE AS NEEDED
Status: DISCONTINUED | OUTPATIENT
Start: 2021-02-10 | End: 2021-02-10 | Stop reason: HOSPADM

## 2021-02-10 RX ORDER — ACETYLCYSTEINE 200 MG/ML
1200 SOLUTION ORAL; RESPIRATORY (INHALATION) 2 TIMES DAILY
Status: DISCONTINUED | OUTPATIENT
Start: 2021-02-10 | End: 2021-02-10

## 2021-02-10 RX ADMIN — ACETYLCYSTEINE 1200 MG: 200 SOLUTION ORAL; RESPIRATORY (INHALATION) at 17:56

## 2021-02-10 RX ADMIN — DOCUSATE SODIUM 100 MG: 100 CAPSULE, LIQUID FILLED ORAL at 10:26

## 2021-02-10 RX ADMIN — ACETYLCYSTEINE 1200 MG: 200 SOLUTION ORAL; RESPIRATORY (INHALATION) at 14:04

## 2021-02-10 RX ADMIN — OXYCODONE HYDROCHLORIDE 5 MG: 5 TABLET ORAL at 06:04

## 2021-02-10 RX ADMIN — ZOLPIDEM TARTRATE 5 MG: 5 TABLET, COATED ORAL at 21:07

## 2021-02-10 RX ADMIN — CEFAZOLIN SODIUM 2000 MG: 2 SOLUTION INTRAVENOUS at 10:51

## 2021-02-10 RX ADMIN — OXYCODONE HYDROCHLORIDE 5 MG: 5 TABLET ORAL at 10:47

## 2021-02-10 RX ADMIN — LIDOCAINE HYDROCHLORIDE 100 MG: 10 INJECTION, SOLUTION EPIDURAL; INFILTRATION; INTRACAUDAL; PERINEURAL at 07:33

## 2021-02-10 RX ADMIN — METOPROLOL SUCCINATE 100 MG: 50 TABLET, EXTENDED RELEASE ORAL at 10:26

## 2021-02-10 RX ADMIN — PROPOFOL 150 MG: 10 INJECTION, EMULSION INTRAVENOUS at 07:33

## 2021-02-10 RX ADMIN — OXYCODONE HYDROCHLORIDE 5 MG: 5 TABLET ORAL at 01:26

## 2021-02-10 RX ADMIN — EPHEDRINE SULFATE 10 MG: 50 INJECTION, SOLUTION INTRAVENOUS at 07:47

## 2021-02-10 RX ADMIN — SERTRALINE HYDROCHLORIDE 50 MG: 50 TABLET ORAL at 10:26

## 2021-02-10 RX ADMIN — ONDANSETRON 4 MG: 2 INJECTION INTRAMUSCULAR; INTRAVENOUS at 07:48

## 2021-02-10 RX ADMIN — DOCUSATE SODIUM 100 MG: 100 CAPSULE, LIQUID FILLED ORAL at 17:56

## 2021-02-10 RX ADMIN — ASPIRIN 81 MG: 81 TABLET, CHEWABLE ORAL at 10:26

## 2021-02-10 RX ADMIN — GABAPENTIN 600 MG: 300 CAPSULE ORAL at 10:27

## 2021-02-10 RX ADMIN — FENTANYL CITRATE 50 MCG: 50 INJECTION, SOLUTION INTRAMUSCULAR; INTRAVENOUS at 07:26

## 2021-02-10 RX ADMIN — TAMSULOSIN HYDROCHLORIDE 0.4 MG: 0.4 CAPSULE ORAL at 21:06

## 2021-02-10 RX ADMIN — INSULIN LISPRO 4 UNITS: 100 INJECTION, SOLUTION INTRAVENOUS; SUBCUTANEOUS at 11:23

## 2021-02-10 RX ADMIN — ISOSORBIDE MONONITRATE 30 MG: 30 TABLET, EXTENDED RELEASE ORAL at 10:26

## 2021-02-10 RX ADMIN — CEFAZOLIN SODIUM 2000 MG: 2 SOLUTION INTRAVENOUS at 07:24

## 2021-02-10 RX ADMIN — INSULIN LISPRO 1 UNITS: 100 INJECTION, SOLUTION INTRAVENOUS; SUBCUTANEOUS at 16:57

## 2021-02-10 RX ADMIN — CEFAZOLIN SODIUM 2000 MG: 2 SOLUTION INTRAVENOUS at 21:05

## 2021-02-10 RX ADMIN — FENTANYL CITRATE 50 MCG: 50 INJECTION, SOLUTION INTRAMUSCULAR; INTRAVENOUS at 07:44

## 2021-02-10 RX ADMIN — SODIUM CHLORIDE: 0.9 INJECTION, SOLUTION INTRAVENOUS at 08:09

## 2021-02-10 RX ADMIN — LATANOPROST 1 DROP: 50 SOLUTION OPHTHALMIC at 21:08

## 2021-02-10 RX ADMIN — INSULIN GLARGINE 10 UNITS: 100 INJECTION, SOLUTION SUBCUTANEOUS at 21:07

## 2021-02-10 RX ADMIN — SODIUM CHLORIDE 100 ML/HR: 0.9 INJECTION, SOLUTION INTRAVENOUS at 00:22

## 2021-02-10 NOTE — INTERVAL H&P NOTE
H&P reviewed  After examining the patient I find no changes in the patients condition since the H&P had been written  Plan cysto, clot evac, fulguration and patient agrees to any prtocedures that may be concomitantly necessary such as TURBT or TURP, with  Risks wayne bleeding, infection, damage to urinary tract, perforation of bladder, need for additional procedures  His LUTS are currently under control with meds      Vitals:    02/09/21 2300   BP: 125/62   Pulse: 62   Resp: 18   Temp: 98 6 °F (37 °C)   SpO2: 98%

## 2021-02-10 NOTE — OP NOTE
OPERATIVE REPORT  PATIENT NAME: Yesenia Hernandez    :  1945  MRN: [id number]  Pt Location: [facility] ROOM 02    SURGERY DATE: 2/10/2021    Surgeon(s) and Role:     * Severa Chol, MD - Primary    Preop Diagnosis:  Gross hematuria [R31 0]      Post-Op Diagnosis Codes:     * Gross hematuria [R31 0]     Due to friable prostatic vessels/BPH    Procedure(s) (LRB):  CYSTOSCOPY EVACUATION OF CLOTS, fulguration (N/A)    Specimen(s):  * No specimens in log *    Estimated Blood Loss:   Minimal    Drains:  * No LDAs found *    Anesthesia Type:   General    Operative Indications:  Gross hematuria [R31 0]        Operative Findings:  Some catheter edema and erythema, small amount of clot  Small amount of bleeding from the prosthetic urethra with were friable vessels  These were fulgurated with the Bugbee electrode  No major prostate obstruction noted  Complications:   None    Procedure and Technique:  47-year-old man with a 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 and BCG and BPH with obstruction developed gross hematuria after undergoing a toe amputation while hospitalized here at [place] [address]  The Shirley catheter was placed and he is on CBI  The urine remains pink so he was offered cystoscopy clot evacuation fulguration and I told him I would take care of any other sources of bleeding such as recurrent tumor or even transurethrally resect his prostate if needed  The risks of bleeding infection damage to the urinary tract need for additional procedures and leakage of urine were explained he gives informed consent  Patient was brought to the operating room identified properly  LMA was induced-it must be noted he was changed general endotracheal later due to leakage around the LMA-and he was prepped and draped in dorsal lithotomy position usual fashion  A time-out was performed, and cystoscopy was carried out with a 22 Yi cystoscopy sheath and 30 and 70 degree lenses    The urethra was normal without stricture  The prostate showed moderate occlusion only  There were some friable vessels of the prosthetic urethra and these showed some oozing  There were some areas of erythema associated with catheter edema in the posterior wall the bladder but no recurrence of tumor  No evidence of perforation or fistula  The clots that were there were small and these were evacuated with the cystoscope sheath itself  Using the Bugbee electrode, I fulgurated the erythematous areas in the back wall the bladder and I fulgurated the prosthetic urethra above the  There was no bleeding at the end the procedure and I replaced a 24 Western Karishma hematuria catheter and the balloon was inflated to 30 cc      I was present for the entire procedure and A qualified resident physician was not available    Patient Disposition:  PACU  and extubated and stable    SIGNATURE: Ervin Rosado MD  DATE: February 10, 2021  TIME: 8:05 AM

## 2021-02-10 NOTE — PROGRESS NOTES
Progress Note -    Lacy Mendiola 66 y o  male MRN: [id number]  Unit/Bed#: OR POOL Encounter: [id number]    Assessment:  65-yr old male with PAD and L 5th metatarsal wound; s/p LLE Agram with attempted PT angioplasty 2/8  Currently on continuous bladder irrigation for hematuria  Hematuria now appears to have resolved  Plans for a cystoscopy by urology today  Stable VS in room air  Hb: 9 1 (9 5)  UA; equivocal     PLAN:  - keep NPO for cystoscopy  - continue CBI  - Agram following complete resolution of hematuria  Subjective:   - no new complaints  - hematuria resolved  Objective:     Vitals: Temp:  [98 1 °F (36 7 °C)-98 6 °F (37 °C)] 98 6 °F (37 °C)  HR:  [61-75] 62  Resp:  [16-18] 18  BP: (116-125)/(58-72) 125/62  Body mass index is 29 57 kg/m²  I/O       02/08 0701 - 02/09 0700 02/09 0701 - 02/10 0700 02/10 0701 - 02/11 0700    P  O  720      I V  (mL/kg) 2080 (18 9) 1000 (9 1) 900 (8 2)    Total Intake(mL/kg) 2800 (25 5) 1000 (9 1) 900 (8 2)    Urine (mL/kg/hr) 5100 (1 9) 4900 (1 9)     Total Output 5100 4900     Net -2300 -3900 +900                 Physical Exam:  GEN: NAD  HEENT: MMM  CV: RRR  Lung: Normal effort  Ab: Soft, NT/ND  Extrem:   R; dopp PT/DP  L: dopp PT/DP  Dry dressing over foot  Neuro: A+Ox3    Lab, Imaging and other studies: I have personally reviewed pertinent reports      VTE Pharmacologic Prophylaxis: Sequential compression device (Venodyne)   VTE Mechanical Prophylaxis: sequential compression device

## 2021-02-10 NOTE — ANESTHESIA PREPROCEDURE EVALUATION
Procedure:  CYSTOSCOPY EVACUATION OF CLOTS possible fulguration (N/A Bladder)    Relevant Problems   CARDIO   (+) CAD (coronary artery disease)   (+) Hyperlipidemia   (+) Hypertension with renal disease   (+) PVD (peripheral vascular disease) (HCC)   (+) Stable angina pectoris (HCC)      ENDO   (+) Secondary renal hyperparathyroidism (HCC)   (+) Type 2 diabetes mellitus, with long-term current use of insulin (HCC)      /RENAL   (+) CKD (chronic kidney disease) stage 3, GFR 30-59 ml/min (HCC)   (+) CKD (chronic kidney disease) stage 4, GFR 15-29 ml/min (HCC)      HEMATOLOGY   (+) Anemia of chronic disease   (+) Chronic anemia      MUSCULOSKELETAL   (+) Back pain   (+) Degeneration of lumbar intervertebral disc   (+) Primary osteoarthritis of left knee      NEURO/PSYCH   (+) Diabetic polyneuropathy associated with type 2 diabetes mellitus (HCC)   (+) Stable angina pectoris ([address] )      Other   (+) Carcinoma in situ of bladder   (+) Gross hematuria      LEFT VENTRICLE:  Systolic function was normal  Ejection fraction was estimated to be 60 %  There were no regional wall motion abnormalities  Wall thickness was mildly to moderately increased  Physical Exam    Airway    Mallampati score: III    Neck ROM: full     Dental       Cardiovascular  Rhythm: regular, Rate: normal,     Pulmonary  Breath sounds clear to auscultation,     Other Findings        Anesthesia Plan  ASA Score- 3     Anesthesia Type- general with ASA Monitors  Additional Monitors:   Airway Plan: ETT and LMA  Plan Factors-Exercise tolerance (METS): <4 METS  Chart reviewed  EKG reviewed  Existing labs reviewed  Patient summary reviewed  Patient is not a current smoker  Patient not instructed to abstain from smoking on day of procedure  Patient did not smoke on day of surgery  Induction- intravenous  Postoperative Plan- Plan for postoperative opioid use       Informed Consent- Anesthetic plan and risks discussed with patient

## 2021-02-10 NOTE — PROGRESS NOTES
NEPHROLOGY PROGRESS NOTE   Alena De Leon 66 y o  male MRN: [id number]  Unit/Bed#: E5 -01 Encounter: [id number]      ASSESSMENT/PLAN:  Chronic kidney disease, stage IV:  - etiology suspect secondary to diabetic kidney disease, hypertensive nephrosclerosis, underlying renal vascular disease, age-related nephron loss  - upon review of medical records, baseline creatinine 2 2-2 5 mg/dL  - most recent creatinine prior to admission on 02/04 was 2 45 mg/dL  - most recent creatinine today 2 05 mg/dL, stable and below baseline  - status post cystoscopy, evacuation of clots, fulguration today, 2/10    - patient is at risk for acute kidney injury secondary to underlying comorbidities after exposure to contrast on 2/8 secondary to angiogram and with repeat intervention planned in IR on 2/11     - recommend isolyte at 75 mL per hour 6 hours pre and 6 hours post procedure + Mucomyst 1200 mg b i d  x 4 doses  Orders placed  - proteinuria: Most recent protein creatinine ratio 530 mg as of December 2020, suspect secondary to diabetic kidney disease   - avoid NSAIDs, nephrotoxic agents, IV contrast  - adjust medications to appropriate GFR  - monitor volume status with strict intake/output, daily weight  - will check BMP, magnesium, phosphorus in a m  Electrolytes, acid/base:  - electrolytes overall stable and acceptable with most recent sodium 139 and potassium of 4 3   - will continue to monitor with repeat lab studies  Blood pressure, hypertension:  - blood pressure overall stable, noted some fluctuation  - currently on: Imdur 30 mg daily, Toprol- mg daily  - recommendations: No changes for now  - optimize hemodynamics; avoid hypotension and fluctuations of blood pressure   - maintain MAP > 65  H/H, anemia:  - most recent hemoglobin 9 1 grams/deciliter   - goal hemoglobin greater than 8 grams/deciliter  - recommend PRBC transfusion for hemoglobin less than 7       Other medical problems:  - left lower extremity wound:   - status post angiogram on 02/08 with angioplasty of left PT, will require IR intervention for retrograde access  Plan for procedure tomorrow, 2/11     - plan per vascular surgery  - diabetes, most recent hemoglobin A1c 7, on insulin per primary team     - hematuria:   - status post cystoscopy and evacuation fulguration revealed mild prosthetic urethral bleeding  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  Areas of bleeding in the prostatic urethra were fulgurated as well as areas of erythema in the posterior wall of the bladder  Small amount of clot removed  - currently with CBI  - plan per Urology  SUBJECTIVE:  Patient resting in bed post procedure  Patient without shortness of breath or chest pain  Patient states he does have some discomfort throughout      OBJECTIVE:  Current Weight: Weight - Scale: 110 kg (242 lb 15 2 oz)  Vitals:    02/10/21 0845   BP: 136/78   Pulse: 70   Resp: 12   Temp: 98 °F (36 7 °C)   SpO2: 97%       Intake/Output Summary (Last 24 hours) at 2/10/2021 1209  Last data filed at 2/10/2021 1051  Gross per 24 hour   Intake 2050 ml   Output 5450 ml   Net -3400 ml       General:  No acute distress  Skin:  Warm, no rash  Eyes:  Sclerae anicteric  ENT:  Moist lips mucous membranes  Neck:  Supple trachea midline  Chest:  Clear breath sounds bilaterally   CVS:  Regular rate regular rhythm  Abdomen:  Soft, nontender, normoactive bowel sounds  Extremities:  No overt edema , dressing over foot   Neuro:  Awake and interactive  Psych:  Appropriate affect      Medications:  Scheduled Meds:  Current Facility-Administered Medications   Medication Dose Route Frequency Provider Last Rate    acetaminophen  650 mg Oral Q6H PRN Damion Cordova MD      ALPRAZolam  0 25 mg Oral BID PRN Damion Cordova MD      aspirin  81 mg Oral Daily Damion Cordova MD      bismuth subsalicylate  274 mg Oral Q6H PRN Damion Cordova MD      cefazolin  2,000 mg Intravenous Q8H Damion Cordova MD 2,000 mg (02/10/21 1051)  docusate sodium  100 mg Oral BID Dameon Alvarado MD      fluticasone  1 spray Each Nare BID Dameon Alvarado MD      gabapentin  600 mg Oral Daily Dameon Alvarado MD      heparin (porcine)  5,000 Units Subcutaneous [facility] Dameon Alvarado MD      insulin glargine  10 Units Subcutaneous HS Dameon Alvarado MD      insulin lispro  1-6 Units Subcutaneous TID [facility] Dameon Alvarado MD      isosorbide mononitrate  30 mg Oral Daily Dameon Alvarado MD      latanoprost  1 drop Both Eyes HS Dameon Alvarado MD      metoprolol succinate  100 mg Oral Daily Dameon Alvarado MD      oxyCODONE  5 mg Oral Q4H PRN Dameon Alvarado MD      pantoprazole  40 mg Oral Early Morning Dameon Alvarado MD      sertraline  50 mg Oral Daily Dameon Alvarado MD      tamsulosin  0 4 mg Oral HS Dameon Alvarado MD      zolpidem  5 mg Oral HS PRN Dameon Alvarado MD         PRN Meds:   acetaminophen    ALPRAZolam    bismuth subsalicylate    oxyCODONE    zolpidem    Continuous Infusions:     Laboratory Results:  Results from last 7 days   Lab Units 02/10/21  0531 02/09/21  0806 02/09/21  0458 02/08/21  1616 02/08/21  0611 02/07/21  1458 02/04/21  1304   WBC Thousand/uL 7 24 7 78  --   --  6 74  --  9 11   HEMOGLOBIN g/dL 9 1* 9 5*  --  9 2* 10 4*  --  10 4*   HEMATOCRIT % 27 3* 28 4*  --  28 0* 31 4*  --  32 6*   PLATELETS Thousands/uL 157 154  --   --  164 203 175   SODIUM mmol/L 139  --  138 138 139  --  142   POTASSIUM mmol/L 4 3  --  4 5 5 4* 4 5  --  4 9   CHLORIDE mmol/L 107  --  106 106 106  --  109*   CO2 mmol/L 23  --  23 26 24  --  26   BUN mg/dL 32*  --  33* 35* 38*  --  50*   CREATININE mg/dL 2 05*  --  1 96* 2 02* 2 03*  --  2 45*   CALCIUM mg/dL 8 4  --  7 9* 7 9* 8 3  --  9 4

## 2021-02-10 NOTE — QUICK NOTE
Cystoscopy and evacuation fulguration showed mild prostatic urethral bleeding  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  Areas of bleeding in the prostatic urethra were fulgurated as well as areas of erythema in the posterior wall the bladder for probably related the catheter  Small amount of clot removed  Can have a voiding trial tomorrow if he remains mostly clear

## 2021-02-10 NOTE — PROGRESS NOTES
Progress Note - Urology  Lacy Mendiola 1945, 66 y o  male MRN: [id number]    Unit/Bed#: E5 -01 Encounter: [id number]    Gross hematuria  Assessment & Plan  Gross hematuria, managed with manual irrigation and CBI yesterday  Now POD#0 cystoscopy fulguration of some bleeding areas within the prostate, no visual recurrence of bladder tumor; small clot evacuated  Urine clear yellow postop, on slow CBI at present  H&H stable  Reassess in AM for Shirley removal Thursday if urine remains reasonably clear    Carcinoma in situ of bladder  Assessment & Plan  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  Oncology History:  1  TURBT (1994): pathology unavailable  2  Induction intravesical BCG (1994)  3  Induction intravesical BCG (1995)  4  TURBT (8/17/2016): TaHG + CIS  5  TURBT (12/13/2016): TaHG + CIS  6  Induction intravesical BCG (1/4/2017-2/08/2017)  7  TURBT (6/19/2017): TaHG  8  TURBT (4/26/2019): T1HG + CIS  9  Induction intravesical BCG (7/2019-) - received 4 treatments and stopped due to symptoms  10  TURBT (11/04/2019): TaHG   11  Induction intravesical BCG+INF (12/09/2019-1/28/2020)  12  Maintenance intravesical BCG+INF (6/09/2020-6/23/2020)  -status post cysto with biopsies, transurethral prostate biopsy and fulguration on 11/19/2020 by Dr Clemons Flash   -bladder biopsies negative 11/19/2020      Subjective: Feels well, no issues waking from anesthesia  Ate lunch  Urine is clear yellow on slow CBI  Some spasms with catheter, understands plan for cath removal tomorrow if urine remains reasonably clear  Review of Systems   Constitutional: Negative  Negative for activity change, appetite change, chills, fever and unexpected weight change  HENT: Negative  Respiratory: Negative  Negative for shortness of breath  Cardiovascular: Negative  Negative for chest pain  Gastrointestinal: Negative for abdominal pain, diarrhea, nausea and vomiting  Endocrine: Negative  Genitourinary: Negative for decreased urine volume, difficulty urinating, dysuria, flank pain, frequency, hematuria and urgency  Musculoskeletal: Negative  Negative for back pain and gait problem  Skin: Negative  Allergic/Immunologic: Negative  Neurological: Negative  Hematological: Negative for adenopathy  Does not bruise/bleed easily  Objective:  Vitals: Blood pressure 136/78, pulse 70, temperature 98 °F (36 7 °C), temperature source Oral, resp  rate 12, height 6' 4" (1 93 m), weight 110 kg (242 lb 15 2 oz), SpO2 97 %  ,Body mass index is 29 57 kg/m²  Intake/Output Summary (Last 24 hours) at 2/10/2021 1254  Last data filed at 2/10/2021 1051  Gross per 24 hour   Intake 2050 ml   Output 5450 ml   Net -3400 ml     Invasive Devices     Peripheral Intravenous Line            Peripheral IV 02/09/21 Dorsal (posterior); Right Forearm 1 day          Drain            Continuous Bladder Irrigation Three-way less than 1 day                Physical Exam  Vitals signs and nursing note reviewed  Constitutional:       Appearance: He is well-developed  Comments: Seated upright eating lunch   HENT:      Head: Normocephalic and atraumatic  Cardiovascular:      Rate and Rhythm: Normal rate and regular rhythm  Heart sounds: Normal heart sounds  No murmur  Pulmonary:      Effort: Pulmonary effort is normal       Breath sounds: Normal breath sounds  Abdominal:      General: Bowel sounds are normal  There is no distension  Palpations: Abdomen is soft  Tenderness: There is no abdominal tenderness  Musculoskeletal: Normal range of motion  Comments: Left foot bandaged, toe wound   Skin:     General: Skin is warm and dry  Capillary Refill: Capillary refill takes less than 2 seconds  Coloration: Skin is not pale  Neurological:      Mental Status: He is alert and oriented to person, place, and time           Labs:  Recent Labs     02/08/21  6440 02/09/21  8056 02/10/21  0531 WBC 6 74 7 78 7 24     Recent Labs     02/08/21  0611 02/08/21  1616 02/09/21  0806 02/10/21  0531   HGB 10 4* 9 2* 9 5* 9 1*       Recent Labs     02/08/21  0611 02/08/21  1616 02/09/21  0458 02/10/21  0531   CREATININE 2 03* 2 02* 1 96* 2 05*       History:    Past Medical History:   Diagnosis Date    Anemia     Last assessed: 9/28/17    Anxiety     Arteriosclerotic cardiovascular disease     Last assessed: 9/28/17    Arthritis     Bladder cancer ([address] [address] [address] )     bladder- had BCG treatments    Chronic kidney disease     CKD (chronic kidney disease) stage 4, GFR 15-29 ml/min ([facility]     Colon polyp     Coronary artery disease     7 stents    Depression     Diabetes mellitus ([facility])     IDDM    GERD (gastroesophageal reflux disease)     Glaucoma     History of fusion of cervical spine     Hyperlipidemia     Hypertension     Insomnia     Last assessed: 11/14/12    Loss of hearing     has hearing aids but usually does not wear them    Other seasonal allergic rhinitis     Last assessed: 2/10/16    PAD (peripheral artery disease) ([facility])     Shortness of breath     on exertion    Spinal stenosis of lumbar region     Transient cerebral ischemia     Uses walker     w/c for longer distances     Past Surgical History:   Procedure Laterality Date    CARDIAC SURGERY      Cath stent placement  Last assessed: 3/9/17  Interventional Catheterization    CHOLECYSTECTOMY      COLONOSCOPY      CYSTOSCOPY      Diagnostic w/biopsy  Quan Tanna  Last assessed: 12/1/14    CYSTOURETHROSCOPY      w/cautery  Quan Tanna    GASTRIC BYPASS      For morbid obesity w/Shaji-en-Y   Resolved: 11/17/09    INCISION AND DRAINAGE OF WOUND Right 2/26/2017    Procedure: INCISION AND DRAINAGE (I&D) EXTREMITY WITH APPLICATION OF GRAFT JACKET;  Surgeon: Dayanna Forrest DPM;  [facility];  Service:     INCISION AND DRAINAGE OF WOUND Right 4/25/2017    Procedure: INCISION AND DRAINAGE (I&D) EXTREMITY, APPLICATION OF GRAFT;  Surgeon: Dianne Farias DPM;  [facility];  Service:     IR BIOPSY OTHER  7/2/2020    IR TUNNELED CENTRAL LINE PLACEMENT  12/24/2020    JOINT REPLACEMENT      christofer knees replaced    ID AMPUTATION METATARSAL+TOE,SINGLE Left 12/21/2020    Procedure: RAY RESECTION FOOT;  Surgeon: Elisabeth Liao DPM;  [facility];  Service: Podiatry    ID AMPUTATION METATARSAL+TOE,SINGLE Left 12/31/2020    Procedure: 5TH MET RESECTION;  Surgeon: Elisabeth Liao DPM;  [facility];  Service: [place] N/A 8/16/2016    Procedure: [place];  Surgeon: Lisette Yeung MD;  [facility];  Service: Urology    ID CYSTOURETHROSCOPY,FULGUR <0 5 CM LESN N/A 11/19/2020    Procedure: CYSTO W/BIOPSIES, transurethral prostate bx;  Surgeon: Brian Baca MD;  [facility];  Service: Urology    ID Danya Rivers 3RD+ ORD SLCTV ABDL PEL/LXTR [facility] Left 2/8/2021    Procedure: LEG angiogram, CO2 w/limited contrast with balloon angioplasty postertior tibial artery;  Surgeon: Jocelyne Alejo MD;  [facility];  Service: Vascular    ROTATOR CUFF REPAIR      SMALL INTESTINE SURGERY      Surgery Shaji-en-Y    SPINAL FUSION      lumbar and cervical fusions    VAC DRESSING APPLICATION Right 2/40/5088    Procedure: APPLICATION VAC DRESSING;  Surgeon: Dianne Farias DPM;  [facility];  Service:      Family History   Problem Relation Age of Onset    Diabetes Mother     Heart disease Mother     Other Mother         High blood pressure    Heart disease Father     Diabetes Sister     Other Sister         High blood pressure    Kidney disease Sister     Heart disease Brother     Other Brother         High blood pressure     Social History     Socioeconomic History    Marital status: /Civil Union     Spouse name: None    Number of children: None    Years of education: None    Highest education level: None   Occupational History  None   Social Needs    Financial resource strain: None    Food insecurity     Worry: None     Inability: None    Transportation needs     Medical: None     Non-medical: None   Tobacco Use    Smoking status: Former Smoker     Quit date: 1970     Years since quittin 1    Smokeless tobacco: Never Used   Substance and Sexual Activity    Alcohol use: Yes     Frequency: 2-3 times a week     Drinks per session: 1 or 2     Binge frequency: Never     Comment: beer / liquor    Drug use: Not Currently     Types: Marijuana     Comment: quit 2019 had medical marijuana    Sexual activity: Not Currently   Lifestyle    Physical activity     Days per week: None     Minutes per session: None    Stress: None   Relationships    Social connections     Talks on phone: None     Gets together: None     Attends Spiritism service: None     Active member of club or organization: None     Attends meetings of clubs or organizations: None     Relationship status: None    Intimate partner violence     Fear of current or ex partner: None     Emotionally abused: None     Physically abused: None     Forced sexual activity: None   Other Topics Concern    None   Social History Narrative    Consumes 1 cup of coffee and 1 soda per day         Regina Gonzalez  Date: 2/10/2021 Time: 12:54 PM

## 2021-02-10 NOTE — PLAN OF CARE
Problem: Potential for Falls  Goal: Patient will remain free of falls  Description: INTERVENTIONS:  - Assess patient frequently for physical needs  -  Identify cognitive and physical deficits and behaviors that affect risk of falls  -  [place] fall precautions as indicated by assessment   - Educate patient/family on patient safety including physical limitations  - Instruct patient to call for assistance with activity based on assessment  - Modify environment to reduce risk of injury  - Consider OT/PT consult to assist with strengthening/mobility  Outcome: Progressing     Problem: NEUROSENSORY - ADULT  Goal: Achieves stable or improved neurological status  Description: INTERVENTIONS  - Monitor and report changes in neurological status  - Monitor vital signs such as temperature, blood pressure, glucose, and any other labs ordered   - Initiate measures to prevent increased intracranial pressure  - Monitor for seizure activity and implement precautions if appropriate      Outcome: Progressing  Goal: Remains free of injury related to seizures activity  Description: INTERVENTIONS  - Maintain airway, patient safety  and administer oxygen as ordered  - Monitor patient for seizure activity, document and report duration and description of seizure to physician/advanced practitioner  - If seizure occurs,  ensure patient safety during seizure  - Reorient patient post seizure  - Seizure pads on all 4 side rails  - Instruct patient/family to notify RN of any seizure activity including if an aura is experienced  - Instruct patient/family to call for assistance with activity based on nursing assessment  - Administer anti-seizure medications if ordered    Outcome: Progressing  Goal: Achieves maximal functionality and self care  Description: INTERVENTIONS  - Monitor swallowing and airway patency with patient fatigue and changes in neurological status  - Encourage and assist patient to increase activity and self care     - Encourage visually impaired, hearing impaired and aphasic patients to use assistive/communication devices  Outcome: Progressing     Problem: CARDIOVASCULAR - ADULT  Goal: Maintains optimal cardiac output and hemodynamic stability  Description: INTERVENTIONS:  - Monitor I/O, vital signs and rhythm  - Monitor for S/S and trends of decreased cardiac output  - Administer and titrate ordered vasoactive medications to optimize hemodynamic stability  - Assess quality of pulses, skin color and temperature  - Assess for signs of decreased coronary artery perfusion  - Instruct patient to report change in severity of symptoms  Outcome: Progressing  Goal: Absence of cardiac dysrhythmias or at baseline rhythm  Description: INTERVENTIONS:  - Continuous cardiac monitoring, vital signs, obtain 12 lead EKG if ordered  - Administer antiarrhythmic and heart rate control medications as ordered  - Monitor electrolytes and administer replacement therapy as ordered  Outcome: Progressing     Problem: RESPIRATORY - ADULT  Goal: Achieves optimal ventilation and oxygenation  Description: INTERVENTIONS:  - Assess for changes in respiratory status  - Assess for changes in mentation and behavior  - Position to facilitate oxygenation and minimize respiratory effort  - Oxygen administered by appropriate delivery if ordered  - Initiate smoking cessation education as indicated  - Encourage broncho-pulmonary hygiene including cough, deep breathe, Incentive Spirometry  - Assess the need for suctioning and aspirate as needed  - Assess and instruct to report SOB or any respiratory difficulty  - Respiratory Therapy support as indicated  Outcome: Progressing     Problem: GENITOURINARY - ADULT  Goal: Maintains or returns to baseline urinary function  Description: INTERVENTIONS:  - Assess urinary function  - Encourage oral fluids to ensure adequate hydration if ordered  - Administer IV fluids as ordered to ensure adequate hydration  - Administer ordered medications as needed  - Offer frequent toileting  - Follow urinary retention protocol if ordered  Outcome: Progressing  Goal: Absence of urinary retention  Description: INTERVENTIONS:  - Assess patients ability to void and empty bladder  - Monitor I/O  - Bladder scan as needed  - Discuss with physician/AP medications to alleviate retention as needed  - Discuss catheterization for long term situations as appropriate  Outcome: Progressing     Problem: METABOLIC, FLUID AND ELECTROLYTES - ADULT  Goal: Electrolytes maintained within normal limits  Description: INTERVENTIONS:  - Monitor labs and assess patient for signs and symptoms of electrolyte imbalances  - Administer electrolyte replacement as ordered  - Monitor response to electrolyte replacements, including repeat lab results as appropriate  - Instruct patient on fluid and nutrition as appropriate  Outcome: Progressing  Goal: Fluid balance maintained  Description: INTERVENTIONS:  - Monitor labs   - Monitor I/O and WT  - Instruct patient on fluid and nutrition as appropriate  - Assess for signs & symptoms of volume excess or deficit  Outcome: Progressing  Goal: Glucose maintained within target range  Description: INTERVENTIONS:  - Monitor Blood Glucose as ordered  - Assess for signs and symptoms of hyperglycemia and hypoglycemia  - Administer ordered medications to maintain glucose within target range  - Assess nutritional intake and initiate nutrition service referral as needed  Outcome: Progressing     Problem: SKIN/TISSUE INTEGRITY - ADULT  Goal: Skin integrity remains intact  Description: INTERVENTIONS  - Identify patients at risk for skin breakdown  - Assess and monitor skin integrity  - Assess and monitor nutrition and hydration status  - Monitor labs (i e  albumin)  - Assess for incontinence   - Turn and reposition patient  - Assist with mobility/ambulation  - Relieve pressure over bony prominences  - Avoid friction and shearing  - Provide appropriate hygiene as needed including keeping skin clean and dry  - Evaluate need for skin moisturizer/barrier cream  - Collaborate with interdisciplinary team (i e  Nutrition, Rehabilitation, etc )   - Patient/family teaching  Outcome: Progressing  Goal: Incision(s), wounds(s) or drain site(s) healing without S/S of infection  Description: INTERVENTIONS  - Assess and document risk factors for skin impairment   - Assess and document dressing, incision, wound bed, drain sites and surrounding tissue  - Consider nutrition services referral as needed  - Oral mucous membranes remain intact  - Provide patient/ family education  Outcome: Progressing  Goal: Oral mucous membranes remain intact  Description: INTERVENTIONS  - Assess oral mucosa and hygiene practices  - Implement preventative oral hygiene regimen  - Implement oral medicated treatments as ordered  - Initiate Nutrition services referral as needed  Outcome: Progressing     Problem: MUSCULOSKELETAL - ADULT  Goal: Maintain or return mobility to safest level of function  Description: INTERVENTIONS:  - Assess patient's ability to carry out ADLs; assess patient's baseline for ADL function and identify physical deficits which impact ability to perform ADLs (bathing, care of mouth/teeth, toileting, grooming, dressing, etc )  - Assess/evaluate cause of self-care deficits   - Assess range of motion  - Assess patient's mobility  - Assess patient's need for assistive devices and provide as appropriate  - Encourage maximum independence but intervene and supervise when necessary  - Involve family in performance of ADLs  - Assess for home care needs following discharge   - Consider OT consult to assist with ADL evaluation and planning for discharge  - Provide patient education as appropriate  Outcome: Progressing  Goal: Maintain proper alignment of affected body part  Description: INTERVENTIONS:  - Support, maintain and protect limb and body alignment  - Provide patient/ family with appropriate education  Outcome: Progressing

## 2021-02-11 ENCOUNTER — APPOINTMENT (INPATIENT)
Dept: RADIOLOGY | Facility: HOSPITAL | Age: 76
DRG: 271 | End: 2021-02-11
Attending: RADIOLOGY
Payer: MEDICARE

## 2021-02-11 LAB
ANION GAP SERPL CALCULATED.3IONS-SCNC: 9 MMOL/L (ref 4–13)
BACTERIA UR QL AUTO: ABNORMAL /HPF
BASOPHILS # BLD AUTO: 0.04 THOUSANDS/ΜL (ref 0–0.1)
BASOPHILS NFR BLD AUTO: 0 % (ref 0–1)
BILIRUB UR QL STRIP: NEGATIVE
BUN SERPL-MCNC: 30 MG/DL (ref 5–25)
CALCIUM SERPL-MCNC: 8.4 MG/DL (ref 8.3–10.1)
CHLORIDE SERPL-SCNC: 104 MMOL/L (ref 100–108)
CLARITY UR: ABNORMAL
CO2 SERPL-SCNC: 23 MMOL/L (ref 21–32)
COLOR UR: YELLOW
CREAT SERPL-MCNC: 2.14 MG/DL (ref 0.6–1.3)
EOSINOPHIL # BLD AUTO: 0.26 THOUSAND/ΜL (ref 0–0.61)
EOSINOPHIL NFR BLD AUTO: 3 % (ref 0–6)
ERYTHROCYTE [DISTWIDTH] IN BLOOD BY AUTOMATED COUNT: 13.5 % (ref 11.6–15.1)
GFR SERPL CREATININE-BSD FRML MDRD: 33 ML/MIN/1.73SQ M
GLUCOSE SERPL-MCNC: 113 MG/DL (ref 65–140)
GLUCOSE SERPL-MCNC: 118 MG/DL (ref 65–140)
GLUCOSE SERPL-MCNC: 88 MG/DL (ref 65–140)
GLUCOSE SERPL-MCNC: 89 MG/DL (ref 65–140)
GLUCOSE UR STRIP-MCNC: NEGATIVE MG/DL
HCT VFR BLD AUTO: 29.2 % (ref 36.5–49.3)
HGB BLD-MCNC: 9.7 G/DL (ref 12–17)
HGB UR QL STRIP.AUTO: ABNORMAL
IMM GRANULOCYTES # BLD AUTO: 0.04 THOUSAND/UL (ref 0–0.2)
IMM GRANULOCYTES NFR BLD AUTO: 0 % (ref 0–2)
KETONES UR STRIP-MCNC: NEGATIVE MG/DL
LEUKOCYTE ESTERASE UR QL STRIP: ABNORMAL
LYMPHOCYTES # BLD AUTO: 2.09 THOUSANDS/ΜL (ref 0.6–4.47)
LYMPHOCYTES NFR BLD AUTO: 21 % (ref 14–44)
MAGNESIUM SERPL-MCNC: 1.9 MG/DL (ref 1.6–2.6)
MCH RBC QN AUTO: 30.8 PG (ref 26.8–34.3)
MCHC RBC AUTO-ENTMCNC: 33.2 G/DL (ref 31.4–37.4)
MCV RBC AUTO: 93 FL (ref 82–98)
MONOCYTES # BLD AUTO: 0.73 THOUSAND/ΜL (ref 0.17–1.22)
MONOCYTES NFR BLD AUTO: 7 % (ref 4–12)
NEUTROPHILS # BLD AUTO: 6.68 THOUSANDS/ΜL (ref 1.85–7.62)
NEUTS SEG NFR BLD AUTO: 69 % (ref 43–75)
NITRITE UR QL STRIP: NEGATIVE
NON-SQ EPI CELLS URNS QL MICRO: ABNORMAL /HPF
NRBC BLD AUTO-RTO: 0 /100 WBCS
PH UR STRIP.AUTO: 6 [PH]
PHOSPHATE SERPL-MCNC: 3.6 MG/DL (ref 2.3–4.1)
PLATELET # BLD AUTO: 180 THOUSANDS/UL (ref 149–390)
PMV BLD AUTO: 10.7 FL (ref 8.9–12.7)
POTASSIUM SERPL-SCNC: 4.6 MMOL/L (ref 3.5–5.3)
PROT UR STRIP-MCNC: ABNORMAL MG/DL
RBC # BLD AUTO: 3.15 MILLION/UL (ref 3.88–5.62)
RBC #/AREA URNS AUTO: ABNORMAL /HPF
SODIUM SERPL-SCNC: 136 MMOL/L (ref 136–145)
SP GR UR STRIP.AUTO: 1.01 (ref 1–1.03)
UROBILINOGEN UR QL STRIP.AUTO: 0.2 E.U./DL
WBC # BLD AUTO: 9.84 THOUSAND/UL (ref 4.31–10.16)
WBC #/AREA URNS AUTO: ABNORMAL /HPF

## 2021-02-11 PROCEDURE — 83735 ASSAY OF MAGNESIUM: CPT | Performed by: NURSE PRACTITIONER

## 2021-02-11 PROCEDURE — 37229 HB TIB/PER REVASC W/ATHER: CPT

## 2021-02-11 PROCEDURE — C1894 INTRO/SHEATH, NON-LASER: HCPCS

## 2021-02-11 PROCEDURE — G9196 MED REASON FOR NO CEPH: HCPCS | Performed by: RADIOLOGY

## 2021-02-11 PROCEDURE — 36140 INTRO NDL ICATH UPR/LXTR ART: CPT

## 2021-02-11 PROCEDURE — 76937 US GUIDE VASCULAR ACCESS: CPT | Performed by: RADIOLOGY

## 2021-02-11 PROCEDURE — 81001 URINALYSIS AUTO W/SCOPE: CPT | Performed by: SURGERY

## 2021-02-11 PROCEDURE — 99153 MOD SED SAME PHYS/QHP EA: CPT

## 2021-02-11 PROCEDURE — NC001 PR NO CHARGE: Performed by: SURGERY

## 2021-02-11 PROCEDURE — 80048 BASIC METABOLIC PNL TOTAL CA: CPT | Performed by: SURGERY

## 2021-02-11 PROCEDURE — C1760 CLOSURE DEV, VASC: HCPCS

## 2021-02-11 PROCEDURE — C1887 CATHETER, GUIDING: HCPCS

## 2021-02-11 PROCEDURE — C1769 GUIDE WIRE: HCPCS

## 2021-02-11 PROCEDURE — 87086 URINE CULTURE/COLONY COUNT: CPT | Performed by: SURGERY

## 2021-02-11 PROCEDURE — 99152 MOD SED SAME PHYS/QHP 5/>YRS: CPT

## 2021-02-11 PROCEDURE — 99152 MOD SED SAME PHYS/QHP 5/>YRS: CPT | Performed by: RADIOLOGY

## 2021-02-11 PROCEDURE — 37229 PR REVASCULARIZE TIBIAL/PERON ARTERY,ANGIOPLASTY/ATHERECTOMY INITIAL: CPT | Performed by: RADIOLOGY

## 2021-02-11 PROCEDURE — 85025 COMPLETE CBC W/AUTO DIFF WBC: CPT | Performed by: SURGERY

## 2021-02-11 PROCEDURE — 84100 ASSAY OF PHOSPHORUS: CPT | Performed by: NURSE PRACTITIONER

## 2021-02-11 PROCEDURE — 99233 SBSQ HOSP IP/OBS HIGH 50: CPT | Performed by: INTERNAL MEDICINE

## 2021-02-11 PROCEDURE — C1725 CATH, TRANSLUMIN NON-LASER: HCPCS

## 2021-02-11 PROCEDURE — 04CS3ZZ EXTIRPATION OF MATTER FROM LEFT POSTERIOR TIBIAL ARTERY, PERCUTANEOUS APPROACH: ICD-10-PCS | Performed by: RADIOLOGY

## 2021-02-11 PROCEDURE — 99024 POSTOP FOLLOW-UP VISIT: CPT | Performed by: NURSE PRACTITIONER

## 2021-02-11 PROCEDURE — 82948 REAGENT STRIP/BLOOD GLUCOSE: CPT

## 2021-02-11 PROCEDURE — 047S3ZZ DILATION OF LEFT POSTERIOR TIBIAL ARTERY, PERCUTANEOUS APPROACH: ICD-10-PCS | Performed by: RADIOLOGY

## 2021-02-11 PROCEDURE — B41GYZZ FLUOROSCOPY OF LEFT LOWER EXTREMITY ARTERIES USING OTHER CONTRAST: ICD-10-PCS | Performed by: RADIOLOGY

## 2021-02-11 RX ORDER — MIDAZOLAM HYDROCHLORIDE 2 MG/2ML
INJECTION, SOLUTION INTRAMUSCULAR; INTRAVENOUS CODE/TRAUMA/SEDATION MEDICATION
Status: COMPLETED | OUTPATIENT
Start: 2021-02-11 | End: 2021-02-11

## 2021-02-11 RX ORDER — FENTANYL CITRATE 50 UG/ML
INJECTION, SOLUTION INTRAMUSCULAR; INTRAVENOUS CODE/TRAUMA/SEDATION MEDICATION
Status: COMPLETED | OUTPATIENT
Start: 2021-02-11 | End: 2021-02-11

## 2021-02-11 RX ORDER — HEPARIN SODIUM 1000 [USP'U]/ML
INJECTION, SOLUTION INTRAVENOUS; SUBCUTANEOUS CODE/TRAUMA/SEDATION MEDICATION
Status: COMPLETED | OUTPATIENT
Start: 2021-02-11 | End: 2021-02-11

## 2021-02-11 RX ADMIN — TAMSULOSIN HYDROCHLORIDE 0.4 MG: 0.4 CAPSULE ORAL at 21:13

## 2021-02-11 RX ADMIN — MIDAZOLAM 1 MG: 1 INJECTION INTRAMUSCULAR; INTRAVENOUS at 14:47

## 2021-02-11 RX ADMIN — SODIUM CHLORIDE, SODIUM GLUCONATE, SODIUM ACETATE, POTASSIUM CHLORIDE, MAGNESIUM CHLORIDE, SODIUM PHOSPHATE, DIBASIC, AND POTASSIUM PHOSPHATE 75 ML/HR: .53; .5; .37; .037; .03; .012; .00082 INJECTION, SOLUTION INTRAVENOUS at 18:14

## 2021-02-11 RX ADMIN — DOCUSATE SODIUM 100 MG: 100 CAPSULE, LIQUID FILLED ORAL at 17:35

## 2021-02-11 RX ADMIN — NITROGLYCERIN 150 MCG: 20 INJECTION INTRAVENOUS at 14:53

## 2021-02-11 RX ADMIN — ISOSORBIDE MONONITRATE 30 MG: 30 TABLET, EXTENDED RELEASE ORAL at 09:19

## 2021-02-11 RX ADMIN — CEFAZOLIN SODIUM 2000 MG: 2 SOLUTION INTRAVENOUS at 05:18

## 2021-02-11 RX ADMIN — METOPROLOL SUCCINATE 100 MG: 50 TABLET, EXTENDED RELEASE ORAL at 09:19

## 2021-02-11 RX ADMIN — POLYETHYLENE GLYCOL 3350 17 G: 17 POWDER, FOR SOLUTION ORAL at 17:36

## 2021-02-11 RX ADMIN — CEFAZOLIN SODIUM 2000 MG: 2 SOLUTION INTRAVENOUS at 13:24

## 2021-02-11 RX ADMIN — FENTANYL CITRATE 50 MCG: 50 INJECTION, SOLUTION INTRAMUSCULAR; INTRAVENOUS at 14:47

## 2021-02-11 RX ADMIN — SODIUM CHLORIDE, SODIUM GLUCONATE, SODIUM ACETATE, POTASSIUM CHLORIDE, MAGNESIUM CHLORIDE, SODIUM PHOSPHATE, DIBASIC, AND POTASSIUM PHOSPHATE 75 ML/HR: .53; .5; .37; .037; .03; .012; .00082 INJECTION, SOLUTION INTRAVENOUS at 00:16

## 2021-02-11 RX ADMIN — ACETAMINOPHEN 650 MG: 325 TABLET ORAL at 17:35

## 2021-02-11 RX ADMIN — MIDAZOLAM 1 MG: 1 INJECTION INTRAMUSCULAR; INTRAVENOUS at 14:08

## 2021-02-11 RX ADMIN — CEFAZOLIN SODIUM 2000 MG: 2 SOLUTION INTRAVENOUS at 21:09

## 2021-02-11 RX ADMIN — OXYCODONE HYDROCHLORIDE 5 MG: 5 TABLET ORAL at 21:14

## 2021-02-11 RX ADMIN — INSULIN GLARGINE 10 UNITS: 100 INJECTION, SOLUTION SUBCUTANEOUS at 21:09

## 2021-02-11 RX ADMIN — FENTANYL CITRATE 50 MCG: 50 INJECTION, SOLUTION INTRAMUSCULAR; INTRAVENOUS at 14:27

## 2021-02-11 RX ADMIN — LATANOPROST 1 DROP: 50 SOLUTION OPHTHALMIC at 21:10

## 2021-02-11 RX ADMIN — MIDAZOLAM 1 MG: 1 INJECTION INTRAMUSCULAR; INTRAVENOUS at 14:27

## 2021-02-11 RX ADMIN — FENTANYL CITRATE 50 MCG: 50 INJECTION, SOLUTION INTRAMUSCULAR; INTRAVENOUS at 14:12

## 2021-02-11 RX ADMIN — FENTANYL CITRATE 50 MCG: 50 INJECTION, SOLUTION INTRAMUSCULAR; INTRAVENOUS at 14:08

## 2021-02-11 RX ADMIN — MIDAZOLAM 0.5 MG: 1 INJECTION INTRAMUSCULAR; INTRAVENOUS at 15:17

## 2021-02-11 RX ADMIN — MIDAZOLAM 1 MG: 1 INJECTION INTRAMUSCULAR; INTRAVENOUS at 14:12

## 2021-02-11 RX ADMIN — FENTANYL CITRATE 25 MCG: 50 INJECTION, SOLUTION INTRAMUSCULAR; INTRAVENOUS at 15:18

## 2021-02-11 RX ADMIN — HEPARIN SODIUM 5000 UNITS: 5000 INJECTION INTRAVENOUS; SUBCUTANEOUS at 21:41

## 2021-02-11 RX ADMIN — HEPARIN SODIUM 3000 UNITS: 1000 INJECTION INTRAVENOUS; SUBCUTANEOUS at 14:34

## 2021-02-11 RX ADMIN — IODIXANOL 42 ML: 320 INJECTION, SOLUTION INTRAVASCULAR at 15:53

## 2021-02-11 NOTE — PROGRESS NOTES
Progress Note - Urology  Aly Ache 1945, 66 y o  male MRN: [id number]    Unit/Bed#: E5 -01 Encounter: [id number]    Gross hematuria  Assessment & Plan  -Gross hematuria, managed with 24 Fr Shirley catheter manual irrigation and CBI 02/09/2021  -POD#1 cystoscopy fulguration of some bleeding areas within the prostate, no visual recurrence of bladder tumor; small clot evacuated  -Urine clear yellow postop, on slow CBI at present  -H&H stable  -urine clear yellow, CBI placed on hold  -hand irrigated no clots noted  -okay for discontinuation of Shirley catheter from urological standpoint unless needed for upcoming surgical procedures    Carcinoma in situ of bladder  Assessment & Plan  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  Oncology History:  1  TURBT (1994): pathology unavailable  2  Induction intravesical BCG (1994)  3  Induction intravesical BCG (1995)  4  TURBT (8/17/2016): TaHG + CIS  5  TURBT (12/13/2016): TaHG + CIS  6  Induction intravesical BCG (1/4/2017-2/08/2017)  7  TURBT (6/19/2017): TaHG  8  TURBT (4/26/2019): T1HG + CIS  9  Induction intravesical BCG (7/2019-) - received 4 treatments and stopped due to symptoms  10  TURBT (11/04/2019): TaHG   11  Induction intravesical BCG+INF (12/09/2019-1/28/2020)  12  Maintenance intravesical BCG+INF (6/09/2020-6/23/2020)  -status post cysto with biopsies, transurethral prostate biopsy and fulguration on 11/19/2020 by Dr Al Juarez   -bladder biopsies negative 11/19/2020        Subjective:  Resting in bed without any complaints offered  Patient states he is scheduled to have vascular procedures today and tomorrow  CBI clamped this time  Shirley draining clear yellow urine  Flush catheter without return of clots  Clear yellow efflux  24 HR EVENTS:   no significant events  Patient has  no complaints        Review of Systems   Constitutional: Negative for activity change, appetite change, chills, fatigue, fever and unexpected weight change  HENT: Negative for facial swelling  Eyes: Negative for discharge  Respiratory: Negative  Negative for cough and shortness of breath  Cardiovascular: Negative for chest pain and leg swelling  Gastrointestinal: Negative  Negative for abdominal distention, abdominal pain, constipation, diarrhea, nausea and vomiting  Endocrine: Negative  Genitourinary: Negative  Negative for decreased urine volume, difficulty urinating, dysuria, enuresis, flank pain, frequency, genital sores, hematuria and urgency  [facility] CBI Shirley catheter   Musculoskeletal: Negative for back pain and myalgias  Skin: Negative for pallor and rash  Allergic/Immunologic: Negative  Negative for immunocompromised state  Neurological: Negative for facial asymmetry and speech difficulty  Psychiatric/Behavioral: Negative for agitation and confusion  Objective:  Nursing Rounds:   Vitals: Blood pressure 143/85, pulse 70, temperature 98 1 °F (36 7 °C), temperature source Temporal, resp  rate 18, height 6' 4" (1 93 m), weight 106 kg (234 lb 9 1 oz), SpO2 99 %  ,Body mass index is 28 55 kg/m²  INS & OUTS:  I/O last 24 hours: In: 1500 [I V :1450; IV Piggyback:50]  Out: 1000 [Urine:1000]    Physical Exam  Vitals signs and nursing note reviewed  Constitutional:       General: He is not in acute distress  Appearance: Normal appearance  He is not ill-appearing, toxic-appearing or diaphoretic  HENT:      Head: Normocephalic  Pulmonary:      Effort: Pulmonary effort is normal  No respiratory distress  Abdominal:      General: Abdomen is flat  There is no distension  Palpations: Abdomen is soft  Tenderness: There is no abdominal tenderness  Genitourinary:     Comments: Shirley catheter draining clear yellow urine  CBI placed on hold  Manually irrigated with 120 mL of normal saline with return of pale yellow efflux no clots noted    Musculoskeletal: General: No swelling  Skin:     General: Skin is warm and dry  Comments: Dressing intact to left lower extremity   Neurological:      General: No focal deficit present  Mental Status: He is alert and oriented to person, place, and time  Psychiatric:         Mood and Affect: Mood normal          Behavior: Behavior normal          Labs:  Recent Labs     02/09/21  0806 02/10/21  0531 02/11/21  0508   WBC 7 78 7 24 9 84       Recent Labs     02/08/21  1616 02/09/21  0806 02/10/21  0531 02/11/21  0508   HGB 9 2* 9 5* 9 1* 9 7*     Recent Labs     02/08/21  1616 02/09/21  0806 02/10/21  0531 02/11/21  0508   HCT 28 0* 28 4* 27 3* 29 2*     Recent Labs     02/08/21  1616 02/09/21  0458 02/10/21  0531 02/11/21  0508   CREATININE 2 02* 1 96* 2 05* 2 14*     Lab Results   Component Value Date    HGB 9 7 (L) 02/11/2021    HCT 29 2 (L) 02/11/2021    WBC 9 84 02/11/2021     02/11/2021     Lab Results   Component Value Date     09/03/2015    K 4 6 02/11/2021     02/11/2021    CO2 23 02/11/2021    BUN 30 (H) 02/11/2021    CREATININE 2 14 (H) 02/11/2021    CALCIUM 8 4 02/11/2021    GLUCOSE 203 (H) 09/03/2015     Urinalysis:  Innumerable WBC's per HPF and innumerable RBC's per HPF  Urine Culture:  Urine culture from 02/09/2021 < 10,000 CFU/mL Staph coccus coagulase negative    A repeated urine cultures in progress    History:    Past Medical History:   Diagnosis Date    Anemia     Last assessed: 9/28/17    Anxiety     Arteriosclerotic cardiovascular disease     Last assessed: 9/28/17    Arthritis     Bladder cancer ([facility] [address] )     bladder- had BCG treatments    Chronic kidney disease     CKD (chronic kidney disease) stage 4, GFR 15-29 ml/min ([facility])     Colon polyp     Coronary artery disease     7 stents    Depression     Diabetes mellitus (HCC)     IDDM    GERD (gastroesophageal reflux disease)     Glaucoma     History of fusion of cervical spine     Hyperlipidemia     Hypertension     Insomnia     Last assessed: 11/14/12    Loss of hearing     has hearing aids but usually does not wear them    Other seasonal allergic rhinitis     Last assessed: 2/10/16    PAD (peripheral artery disease) (HCC)     Shortness of breath     on exertion    Spinal stenosis of lumbar region     Transient cerebral ischemia     Uses walker     w/c for longer distances     Past Surgical History:   Procedure Laterality Date    CARDIAC SURGERY      Cath stent placement  Last assessed: 3/9/17  Interventional Catheterization    CHOLECYSTECTOMY      COLONOSCOPY      CYSTOSCOPY      Diagnostic w/biopsy  Irene Bustos  Last assessed: 12/1/14    CYSTOURETHROSCOPY      w/cautery  Irenelarisa Bustos    GASTRIC BYPASS      For morbid obesity w/Shaji-en-Y   Resolved: 11/17/09    INCISION AND DRAINAGE OF WOUND Right 2/26/2017    Procedure: INCISION AND DRAINAGE (I&D) EXTREMITY WITH APPLICATION OF GRAFT JACKET;  Surgeon: Alejandra Vail DPM;  [facility];  Service:     INCISION AND DRAINAGE OF WOUND Right 4/25/2017    Procedure: INCISION AND DRAINAGE (I&D) EXTREMITY, APPLICATION OF GRAFT;  Surgeon: Alejandra Vail DPM;  [facility];  Service:     IR BIOPSY OTHER  7/2/2020    IR TUNNELED CENTRAL LINE PLACEMENT  12/24/2020    JOINT REPLACEMENT      christofer knees replaced    ID AMPUTATION METATARSAL+TOE,SINGLE Left 12/21/2020    Procedure: RAY RESECTION FOOT;  Surgeon: Rajendra Mckeon DPM;  [facility];  Service: Podiatry    ID AMPUTATION METATARSAL+TOE,SINGLE Left 12/31/2020    Procedure: 5TH MET RESECTION;  Surgeon: Rajendra Mckeon DPM;  [facility];  Service: Podiatry    ID CYSTOURETHROSCOPY W/IRRIG & EVAC CLOTS N/A 2/10/2021    Procedure: CYSTOSCOPY EVACUATION OF CLOTS, fulguration;  Surgeon: Aydin Obando MD;  [facility];  Service: Urology    ID CYSTOURETHROSCOPY,BIOPSY N/A 8/16/2016    Procedure: Noe Fermin;  Surgeon: Jake Hernandez MD;  [facility];  Service: Urology    PA CYSTOURETHROSCOPY,FULGUR <0 5 CM LESN N/A 2020    Procedure: CYSTO W/BIOPSIES, transurethral prostate bx;  Surgeon: Yusuf Lala MD;  [facility];  Service: Urology    PA Liza Farooq 3RD+ ORD SLCTV ABDL PEL/LXTR [facility] Left 2021    Procedure: LEG angiogram, CO2 w/limited contrast with balloon angioplasty postertior tibial artery;  Surgeon: Ismael Sullivan MD;  [facility];  Service: Vascular    ROTATOR CUFF REPAIR      SMALL INTESTINE SURGERY      Surgery Shaji-en-Y    SPINAL FUSION      lumbar and cervical fusions    VAC DRESSING APPLICATION Right     Procedure: APPLICATION VAC DRESSING;  Surgeon: Oralia Majano DPM;  [facility];  Service:      Family History   Problem Relation Age of Onset    Diabetes Mother     Heart disease Mother     Other Mother         High blood pressure    Heart disease Father     Diabetes Sister     Other Sister         High blood pressure    Kidney disease Sister     Heart disease Brother     Other Brother         High blood pressure     Social History     Socioeconomic History    Marital status: /Civil Union     Spouse name: None    Number of children: None    Years of education: None    Highest education level: None   Occupational History    None   Social Needs    Financial resource strain: None    Food insecurity     Worry: None     Inability: None    Transportation needs     Medical: None     Non-medical: None   Tobacco Use    Smoking status: Former Smoker     Quit date: 1970     Years since quittin 1    Smokeless tobacco: Never Used   Substance and Sexual Activity    Alcohol use: Yes     Frequency: 2-3 times a week     Drinks per session: 1 or 2     Binge frequency: Never     Comment: beer / liquor    Drug use: Not Currently     Types: Marijuana     Comment: quit 2019 had medical marijuana    Sexual activity: Not Currently   Lifestyle    Physical activity     Days per week: None     Minutes per session: None    Stress: None   Relationships    Social connections     Talks on phone: None     Gets together: None     Attends Sikh service: None     Active member of club or organization: None     Attends meetings of clubs or organizations: None     Relationship status: None    Intimate partner violence     Fear of current or ex partner: None     Emotionally abused: None     Physically abused: None     Forced sexual activity: None   Other Topics Concern    None   Social History Narrative    Consumes 1 cup of coffee and 1 soda per day       EVANGELINA Lechuga  Date: 2/11/2021 Time: 10:12 AM

## 2021-02-11 NOTE — BRIEF OP NOTE (RAD/CATH)
INTERVENTIONAL RADIOLOGY PROCEDURE NOTE    Date: 2/11/2021    Procedure:  Left leg arteriogram with intervention    Preoperative diagnosis:   1  Stage 3 chronic kidney disease, unspecified whether stage 3a or 3b CKD    2  PAD (peripheral artery disease) ([facility] [address] )    3  Left toe amputee ([facility] [address] )    4  Gross hematuria    5  Carcinoma in situ of bladder         Postoperative diagnosis: Same  Surgeon: Malina Stewart MD     Assistant: None  No qualified resident was available  Blood loss:  Minimal    Specimens:  None     Findings:  Antegrade left access  He has high common femoral bifurcation  Focal mid posterior tibial lesion treated with [facility] atherectomy and 3 mm angioplasty with excellent result  No significant residual   At completion, there is intact two-vessel runoff via peroneal and posterior tibial arteries  Unable to traverse short proximal anterior tibial occlusion antegrade  Minx closure left common femoral artery  Complications: None immediate  Anesthesia: conscious sedation       Vascular Quality Initiative - Peripheral Vascular Intervention     Urgency: Urgent    Functional Status:  Restricted in physically strenuous activity but ambulatory and able to carry out work of a light or sedentary nature  Ambulation: Amb = independently ambulatory    Leg Symptoms    Right: Asymptomatic: documented peripheral arterial disease without symptoms of claudication or ischemic pain  Treatment of Native Artery to Maintain Bypass Patency?:  No  Left: Non-healing Amputation        Tissue Loss Severity: Grade 2, Deep      Infection: Grade 0, None     COVID Information  COVID Symptoms Pre-Procedure: Asymptomatic    Treatment Delayed by Pandemic: None    Access   Number of Sites: 1     Access Site 1:     Side 1: Left    Site 1: Femoral Antegrade    Access Guidance 1:U/S    Largest Sheath Size 1: 6 Fr      Closure Device 1: MynxGrip      Number of Closure Devices: 1     Closure Device Outcome: Closure device successful         Procedure  Fluoro Time: 23 07 minutes  Contrast Volume: Visipaque 42 ml  DAP: 1079 Gy cm2  CO2: no  Anticoagulant: Heparin  Protamine: No  If Creatinine is > 1 2 or missing, ROSSY Prophylaxis IV saline     Treatment Details  Indication: Occlusive Disease,  Number of Arteries Treated:  1   Completion Assessment  Artery 1 treated: Post Tibial  Left             Outflow: 2                  Was this Site previously treated?: Yes, PTA          TASC Grade: B          Total Treated Length: 2 cm          Total Occluded Length: 0 cm          Calcification: Mild (calcification on one side of artery > half length of lesion)          Number of Treatment types (Devices): 2           Device 1          Treatment Type:  Atherectomy               : Classana          Diameter: 1 8 mm          Length: n/a         Device 2          Treatment Type: Plain Balloon            Concomitant: None          Technical result: Successful (stenosis <=30%)         Post Procedure  Discharge status: Inpatient unit  Procedure Complications: No

## 2021-02-11 NOTE — PROGRESS NOTES
NEPHROLOGY PROGRESS NOTE   Susan Hines 66 y o  male MRN: [id number]  Unit/Bed#: E5 -01 Encounter: [id number]      ASSESSMENT/PLAN:  Chronic kidney disease, stage IV:  - etiology suspect secondary to diabetic kidney disease, hypertensive nephrosclerosis, underlying renal vascular disease, age-related nephron loss  - upon review of medical records, baseline creatinine 2 2-2 5 mg/dL  - most recent creatinine prior to admission on 02/04 was 2 45 mg/dL  - most recent creatinine today 2 14 mg/dL  - status post cystoscopy, evacuation of clots, fulguration on 2/10               - patient is at risk for acute kidney injury secondary to underlying comorbidities after exposure to contrast on 2/8 secondary to angiogram and with repeat intervention planned in IR today, 2/11                - on isolyte at 75 mL per hour 6 hours pre and 6 hours post procedure + Mucomyst 1200 mg b i d  x 4 doses  - proteinuria: Most recent protein creatinine ratio 530 mg as of December 2020, suspect secondary to diabetic kidney disease    - avoid NSAIDs, nephrotoxic agents, IV contrast  - adjust medications to appropriate GFR  - monitor volume status with strict intake/output, daily weight  - will check BMP, magnesium, phosphorus in a m      Electrolytes, acid/base:  - electrolytes overall stable and acceptable with most recent sodium 136 and potassium of 4 6   - most recent bicarbonate 23    - will continue to monitor with repeat lab studies      Blood pressure, hypertension:  - blood pressure overall stable  - currently on: Imdur 30 mg daily, Toprol- mg daily  - recommendations: No changes for now  - optimize hemodynamics; avoid hypotension and fluctuations of blood pressure   - maintain MAP > 65       H/H, anemia:  - most recent hemoglobin 9 7 grams/deciliter   - goal hemoglobin greater than 8 grams/deciliter    - recommend PRBC transfusion for hemoglobin less than 7       Other medical problems:  - left lower extremity wound:              - status post angiogram on 02/08 with angioplasty of left PT, will require IR intervention for retrograde access  Plan for procedure today, 2/11                - plan per vascular surgery      - diabetes, most recent hemoglobin A1c 7, on insulin per primary team      - hematuria:              - status post cystoscopy and evacuation fulguration on 02/10 revealed mild prosthetic urethral bleeding  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  Areas of bleeding in the prostatic urethra were fulgurated as well as areas of erythema in the posterior wall of the bladder  Small amount of clot removed  - currently with CBI  - plan per Urology  SUBJECTIVE:  Patient resting in bed awaiting procedure  Patient without acute shortness of breath or chest pain      OBJECTIVE:  Current Weight: Weight - Scale: 106 kg (234 lb 9 1 oz)  Vitals:    02/11/21 0724   BP: 143/85   Pulse: 70   Resp: 18   Temp: 98 1 °F (36 7 °C)   SpO2: 99%       Intake/Output Summary (Last 24 hours) at 2/11/2021 1049  Last data filed at 2/11/2021 0743  Gross per 24 hour   Intake 450 ml   Output 950 ml   Net -500 ml       General:  No acute distress  Skin:  Warm, no rash  Eyes:  Sclerae anicteric  ENT:  Moist lips mucous membranes  Neck:  Supple trachea midline  Chest:  Clear breath sounds bilaterally   CVS:  Regular rate regular rhythm  Abdomen:  Soft, nontender, normoactive bowel sounds  Extremities:  No overt edema, left foot wrapped  Neuro:  Awake and interactive  Psych:  Appropriate affect      Medications:  Scheduled Meds:  Current Facility-Administered Medications   Medication Dose Route Frequency Provider Last Rate    acetaminophen  650 mg Oral Q6H PRN Sharlene Szymanski MD      ALPRAZolam  0 25 mg Oral BID PRN Sharlene Szymanski MD      aspirin  81 mg Oral Daily Sharlene Szymanski MD      bismuth subsalicylate  792 mg Oral Q6H PRN Sharlene Szymanski MD      cefazolin  2,000 mg Intravenous Q8H Sharlene Szymanski MD 2,000 mg (02/11/21 0518)    docusate sodium  100 mg Oral BID Perrin Delay, MD      fluticasone  1 spray Each Nare BID Perrin Delay, MD      gabapentin  600 mg Oral Daily Perrin Delay, MD      heparin (porcine)  5,000 Units Subcutaneous [facility] Perrin Delay, MD      insulin glargine  10 Units Subcutaneous HS Perrin Delay, MD      insulin lispro  1-6 Units Subcutaneous TID [facility] Perrin Delay, MD      isosorbide mononitrate  30 mg Oral Daily Perrin Delay, MD      latanoprost  1 drop Both Eyes HS Perrin Delay, MD      metoprolol succinate  100 mg Oral Daily Perrin Delay, MD      multi-electrolyte  75 mL/hr Intravenous Continuous MOE ArguetaNP 75 mL/hr (02/11/21 0016)    oxyCODONE  5 mg Oral Q4H PRN Perrin Delay, MD      pantoprazole  40 mg Oral Early Morning Perrin Delay, MD      polyethylene glycol  17 g Oral Daily [place], DO      sertraline  50 mg Oral Daily Perrin Delay, MD      tamsulosin  0 4 mg Oral HS Perrin Delay, MD      zolpidem  5 mg Oral HS PRN Perrin Delay, MD         PRN Meds:   acetaminophen    ALPRAZolam    bismuth subsalicylate    oxyCODONE    zolpidem    Continuous Infusions:multi-electrolyte, 75 mL/hr, Last Rate: 75 mL/hr (02/11/21 0016)        Laboratory Results:  Results from last 7 days   Lab Units 02/11/21  0508 02/10/21  0531 02/09/21  0806 02/09/21  0458 02/08/21  1616 02/08/21  0611 02/07/21  1458 02/04/21  1304   WBC Thousand/uL 9 84 7 24 7 78  --   --  6 74  --  9 11   HEMOGLOBIN g/dL 9 7* 9 1* 9 5*  --  9 2* 10 4*  --  10 4*   HEMATOCRIT % 29 2* 27 3* 28 4*  --  28 0* 31 4*  --  32 6*   PLATELETS Thousands/uL 180 157 154  --   --  164 203 175   SODIUM mmol/L 136 139  --  138 138 139  --  142   POTASSIUM mmol/L 4 6 4 3  --  4 5 5 4* 4 5  --  4 9   CHLORIDE mmol/L 104 107  --  106 106 106  --  109*   CO2 mmol/L 23 23  --  23 26 24  --  26   BUN mg/dL 30* 32*  --  33* 35* 38*  --  50*   CREATININE mg/dL 2 14* 2 05*  --  1 96* 2 02* 2 03*  --  2 45*   CALCIUM mg/dL 8 4 8 4  --  7 9* 7 9* 8 3  --  9 4   MAGNESIUM mg/dL 1 9  --   --   --   --   --   --   --    PHOSPHORUS mg/dL 3 6  --   --   --   --   --   --   --

## 2021-02-11 NOTE — PLAN OF CARE
Problem: Potential for Falls  Goal: Patient will remain free of falls  Description: INTERVENTIONS:  - Assess patient frequently for physical needs  -  Identify cognitive and physical deficits and behaviors that affect risk of falls  -  [place] fall precautions as indicated by assessment   - Educate patient/family on patient safety including physical limitations  - Instruct patient to call for assistance with activity based on assessment  - Modify environment to reduce risk of injury  - Consider OT/PT consult to assist with strengthening/mobility  Outcome: Progressing     Problem: NEUROSENSORY - ADULT  Goal: Achieves stable or improved neurological status  Description: INTERVENTIONS  - Monitor and report changes in neurological status  - Monitor vital signs such as temperature, blood pressure, glucose, and any other labs ordered   - Initiate measures to prevent increased intracranial pressure  - Monitor for seizure activity and implement precautions if appropriate      Outcome: Progressing  Goal: Remains free of injury related to seizures activity  Description: INTERVENTIONS  - Maintain airway, patient safety  and administer oxygen as ordered  - Monitor patient for seizure activity, document and report duration and description of seizure to physician/advanced practitioner  - If seizure occurs,  ensure patient safety during seizure  - Reorient patient post seizure  - Seizure pads on all 4 side rails  - Instruct patient/family to notify RN of any seizure activity including if an aura is experienced  - Instruct patient/family to call for assistance with activity based on nursing assessment  - Administer anti-seizure medications if ordered    Outcome: Progressing  Goal: Achieves maximal functionality and self care  Description: INTERVENTIONS  - Monitor swallowing and airway patency with patient fatigue and changes in neurological status  - Encourage and assist patient to increase activity and self care     - Encourage visually impaired, hearing impaired and aphasic patients to use assistive/communication devices  Outcome: Progressing     Problem: CARDIOVASCULAR - ADULT  Goal: Maintains optimal cardiac output and hemodynamic stability  Description: INTERVENTIONS:  - Monitor I/O, vital signs and rhythm  - Monitor for S/S and trends of decreased cardiac output  - Administer and titrate ordered vasoactive medications to optimize hemodynamic stability  - Assess quality of pulses, skin color and temperature  - Assess for signs of decreased coronary artery perfusion  - Instruct patient to report change in severity of symptoms  Outcome: Progressing  Goal: Absence of cardiac dysrhythmias or at baseline rhythm  Description: INTERVENTIONS:  - Continuous cardiac monitoring, vital signs, obtain 12 lead EKG if ordered  - Administer antiarrhythmic and heart rate control medications as ordered  - Monitor electrolytes and administer replacement therapy as ordered  Outcome: Progressing     Problem: RESPIRATORY - ADULT  Goal: Achieves optimal ventilation and oxygenation  Description: INTERVENTIONS:  - Assess for changes in respiratory status  - Assess for changes in mentation and behavior  - Position to facilitate oxygenation and minimize respiratory effort  - Oxygen administered by appropriate delivery if ordered  - Initiate smoking cessation education as indicated  - Encourage broncho-pulmonary hygiene including cough, deep breathe, Incentive Spirometry  - Assess the need for suctioning and aspirate as needed  - Assess and instruct to report SOB or any respiratory difficulty  - Respiratory Therapy support as indicated  Outcome: Progressing     Problem: GENITOURINARY - ADULT  Goal: Maintains or returns to baseline urinary function  Description: INTERVENTIONS:  - Assess urinary function  - Encourage oral fluids to ensure adequate hydration if ordered  - Administer IV fluids as ordered to ensure adequate hydration  - Administer ordered medications as needed  - Offer frequent toileting  - Follow urinary retention protocol if ordered  Outcome: Progressing  Goal: Absence of urinary retention  Description: INTERVENTIONS:  - Assess patients ability to void and empty bladder  - Monitor I/O  - Bladder scan as needed  - Discuss with physician/AP medications to alleviate retention as needed  - Discuss catheterization for long term situations as appropriate  Outcome: Progressing     Problem: METABOLIC, FLUID AND ELECTROLYTES - ADULT  Goal: Electrolytes maintained within normal limits  Description: INTERVENTIONS:  - Monitor labs and assess patient for signs and symptoms of electrolyte imbalances  - Administer electrolyte replacement as ordered  - Monitor response to electrolyte replacements, including repeat lab results as appropriate  - Instruct patient on fluid and nutrition as appropriate  Outcome: Progressing  Goal: Fluid balance maintained  Description: INTERVENTIONS:  - Monitor labs   - Monitor I/O and WT  - Instruct patient on fluid and nutrition as appropriate  - Assess for signs & symptoms of volume excess or deficit  Outcome: Progressing  Goal: Glucose maintained within target range  Description: INTERVENTIONS:  - Monitor Blood Glucose as ordered  - Assess for signs and symptoms of hyperglycemia and hypoglycemia  - Administer ordered medications to maintain glucose within target range  - Assess nutritional intake and initiate nutrition service referral as needed  Outcome: Progressing     Problem: SKIN/TISSUE INTEGRITY - ADULT  Goal: Skin integrity remains intact  Description: INTERVENTIONS  - Identify patients at risk for skin breakdown  - Assess and monitor skin integrity  - Assess and monitor nutrition and hydration status  - Monitor labs (i e  albumin)  - Assess for incontinence   - Turn and reposition patient  - Assist with mobility/ambulation  - Relieve pressure over bony prominences  - Avoid friction and shearing  - Provide appropriate hygiene as needed including keeping skin clean and dry  - Evaluate need for skin moisturizer/barrier cream  - Collaborate with interdisciplinary team (i e  Nutrition, Rehabilitation, etc )   - Patient/family teaching  Outcome: Progressing  Goal: Incision(s), wounds(s) or drain site(s) healing without S/S of infection  Description: INTERVENTIONS  - Assess and document risk factors for skin impairment   - Assess and document dressing, incision, wound bed, drain sites and surrounding tissue  - Consider nutrition services referral as needed  - Oral mucous membranes remain intact  - Provide patient/ family education  Outcome: Progressing  Goal: Oral mucous membranes remain intact  Description: INTERVENTIONS  - Assess oral mucosa and hygiene practices  - Implement preventative oral hygiene regimen  - Implement oral medicated treatments as ordered  - Initiate Nutrition services referral as needed  Outcome: Progressing     Problem: MUSCULOSKELETAL - ADULT  Goal: Maintain or return mobility to safest level of function  Description: INTERVENTIONS:  - Assess patient's ability to carry out ADLs; assess patient's baseline for ADL function and identify physical deficits which impact ability to perform ADLs (bathing, care of mouth/teeth, toileting, grooming, dressing, etc )  - Assess/evaluate cause of self-care deficits   - Assess range of motion  - Assess patient's mobility  - Assess patient's need for assistive devices and provide as appropriate  - Encourage maximum independence but intervene and supervise when necessary  - Involve family in performance of ADLs  - Assess for home care needs following discharge   - Consider OT consult to assist with ADL evaluation and planning for discharge  - Provide patient education as appropriate  Outcome: Progressing  Goal: Maintain proper alignment of affected body part  Description: INTERVENTIONS:  - Support, maintain and protect limb and body alignment  - Provide patient/ family with appropriate education  Outcome: Progressing

## 2021-02-11 NOTE — PROGRESS NOTES
Progress Note - Vascular Surgery   Chely Mortensen 66 y o  male MRN: [id number]  Unit/Bed#: E5 -01 Encounter: [id number]    Assessment:  76M s/p agram with angioplasty of left PT  Stage 3 CKD  LLE angiogram 2/8  Cystoscopy 2/10    His urine clean this morning    Plan:   NPO   IVF   IR angiogram today    Subjective/Objective     Subjective:   Complains mild pain in left foot    Objective:    Blood pressure 143/85, pulse 70, temperature 98 1 °F (36 7 °C), temperature source Temporal, resp  rate 18, height 6' 4" (1 93 m), weight 106 kg (234 lb 9 1 oz), SpO2 99 %  ,Body mass index is 28 55 kg/m²  Intake/Output Summary (Last 24 hours) at 2/11/2021 0931  Last data filed at 2/11/2021 0659  Gross per 24 hour   Intake 450 ml   Output 950 ml   Net -500 ml       Invasive Devices     Peripheral Intravenous Line            Peripheral IV 02/09/21 Dorsal (posterior); Right Forearm 2 days          Drain            Continuous Bladder Irrigation Three-way 1 day                Physical Exam:   Gen:  NAD  CV:  warm, well-perfused  Lungs: nl effort  Abd:  soft, NT/ND  Ext:  Dopp right DT/PT, palpable L fem, dopp L PT, dressing in place  Neuro: A&Ox3     Results from last 7 days   Lab Units 02/11/21  0508 02/10/21  0531 02/09/21  0806   WBC Thousand/uL 9 84 7 24 7 78   HEMOGLOBIN g/dL 9 7* 9 1* 9 5*   HEMATOCRIT % 29 2* 27 3* 28 4*   PLATELETS Thousands/uL 180 157 154     Results from last 7 days   Lab Units 02/11/21  0508 02/10/21  0531 02/09/21  0458   POTASSIUM mmol/L 4 6 4 3 4 5   CHLORIDE mmol/L 104 107 106   CO2 mmol/L 23 23 23   BUN mg/dL 30* 32* 33*   CREATININE mg/dL 2 14* 2 05* 1 96*   CALCIUM mg/dL 8 4 8 4 7 9*     Results from last 7 days   Lab Units 02/08/21  0711   INR  1 06

## 2021-02-12 ENCOUNTER — APPOINTMENT (INPATIENT)
Dept: NON INVASIVE DIAGNOSTICS | Facility: HOSPITAL | Age: 76
DRG: 271 | End: 2021-02-12
Payer: MEDICARE

## 2021-02-12 LAB
ANION GAP SERPL CALCULATED.3IONS-SCNC: 7 MMOL/L (ref 4–13)
BACTERIA UR CULT: NORMAL
BUN SERPL-MCNC: 29 MG/DL (ref 5–25)
CALCIUM SERPL-MCNC: 8.4 MG/DL (ref 8.3–10.1)
CHLORIDE SERPL-SCNC: 104 MMOL/L (ref 100–108)
CO2 SERPL-SCNC: 26 MMOL/L (ref 21–32)
CREAT SERPL-MCNC: 2.03 MG/DL (ref 0.6–1.3)
GFR SERPL CREATININE-BSD FRML MDRD: 35 ML/MIN/1.73SQ M
GLUCOSE SERPL-MCNC: 102 MG/DL (ref 65–140)
GLUCOSE SERPL-MCNC: 116 MG/DL (ref 65–140)
GLUCOSE SERPL-MCNC: 121 MG/DL (ref 65–140)
GLUCOSE SERPL-MCNC: 125 MG/DL (ref 65–140)
GLUCOSE SERPL-MCNC: 99 MG/DL (ref 65–140)
MAGNESIUM SERPL-MCNC: 1.9 MG/DL (ref 1.6–2.6)
PHOSPHATE SERPL-MCNC: 3.7 MG/DL (ref 2.3–4.1)
POTASSIUM SERPL-SCNC: 4.5 MMOL/L (ref 3.5–5.3)
SODIUM SERPL-SCNC: 137 MMOL/L (ref 136–145)

## 2021-02-12 PROCEDURE — 80048 BASIC METABOLIC PNL TOTAL CA: CPT | Performed by: NURSE PRACTITIONER

## 2021-02-12 PROCEDURE — 93922 UPR/L XTREMITY ART 2 LEVELS: CPT | Performed by: SURGERY

## 2021-02-12 PROCEDURE — 99024 POSTOP FOLLOW-UP VISIT: CPT | Performed by: NURSE PRACTITIONER

## 2021-02-12 PROCEDURE — 84100 ASSAY OF PHOSPHORUS: CPT | Performed by: NURSE PRACTITIONER

## 2021-02-12 PROCEDURE — 83735 ASSAY OF MAGNESIUM: CPT | Performed by: NURSE PRACTITIONER

## 2021-02-12 PROCEDURE — 93926 LOWER EXTREMITY STUDY: CPT

## 2021-02-12 PROCEDURE — 99232 SBSQ HOSP IP/OBS MODERATE 35: CPT | Performed by: SURGERY

## 2021-02-12 PROCEDURE — 93926 LOWER EXTREMITY STUDY: CPT | Performed by: SURGERY

## 2021-02-12 PROCEDURE — 99232 SBSQ HOSP IP/OBS MODERATE 35: CPT | Performed by: INTERNAL MEDICINE

## 2021-02-12 PROCEDURE — 82948 REAGENT STRIP/BLOOD GLUCOSE: CPT

## 2021-02-12 RX ADMIN — ISOSORBIDE MONONITRATE 30 MG: 30 TABLET, EXTENDED RELEASE ORAL at 08:12

## 2021-02-12 RX ADMIN — DOCUSATE SODIUM 100 MG: 100 CAPSULE, LIQUID FILLED ORAL at 08:11

## 2021-02-12 RX ADMIN — LATANOPROST 1 DROP: 50 SOLUTION OPHTHALMIC at 21:32

## 2021-02-12 RX ADMIN — CEFAZOLIN SODIUM 2000 MG: 2 SOLUTION INTRAVENOUS at 05:46

## 2021-02-12 RX ADMIN — CEFAZOLIN SODIUM 2000 MG: 2 SOLUTION INTRAVENOUS at 21:28

## 2021-02-12 RX ADMIN — HEPARIN SODIUM 5000 UNITS: 5000 INJECTION INTRAVENOUS; SUBCUTANEOUS at 21:32

## 2021-02-12 RX ADMIN — METOPROLOL SUCCINATE 100 MG: 50 TABLET, EXTENDED RELEASE ORAL at 08:11

## 2021-02-12 RX ADMIN — HEPARIN SODIUM 5000 UNITS: 5000 INJECTION INTRAVENOUS; SUBCUTANEOUS at 13:43

## 2021-02-12 RX ADMIN — ASPIRIN 81 MG: 81 TABLET, CHEWABLE ORAL at 08:11

## 2021-02-12 RX ADMIN — OXYCODONE HYDROCHLORIDE 5 MG: 5 TABLET ORAL at 08:12

## 2021-02-12 RX ADMIN — POLYETHYLENE GLYCOL 3350 17 G: 17 POWDER, FOR SOLUTION ORAL at 08:11

## 2021-02-12 RX ADMIN — PANTOPRAZOLE SODIUM 40 MG: 40 TABLET, DELAYED RELEASE ORAL at 05:46

## 2021-02-12 RX ADMIN — TAMSULOSIN HYDROCHLORIDE 0.4 MG: 0.4 CAPSULE ORAL at 21:32

## 2021-02-12 RX ADMIN — SERTRALINE HYDROCHLORIDE 50 MG: 50 TABLET ORAL at 08:12

## 2021-02-12 RX ADMIN — CEFAZOLIN SODIUM 2000 MG: 2 SOLUTION INTRAVENOUS at 13:44

## 2021-02-12 RX ADMIN — INSULIN GLARGINE 10 UNITS: 100 INJECTION, SOLUTION SUBCUTANEOUS at 21:32

## 2021-02-12 RX ADMIN — GABAPENTIN 600 MG: 300 CAPSULE ORAL at 08:11

## 2021-02-12 RX ADMIN — ZOLPIDEM TARTRATE 5 MG: 5 TABLET, COATED ORAL at 21:32

## 2021-02-12 RX ADMIN — DOCUSATE SODIUM 100 MG: 100 CAPSULE, LIQUID FILLED ORAL at 17:05

## 2021-02-12 NOTE — PROGRESS NOTES
Progress Note - Podiatry  Francisco Harden 66 y o  male MRN: [id number]  Unit/Bed#: E5 -01 Encounter: [id number]    Assessment:  76M with PAD, diabetes with peripheral neuropathy here with nonhealing surgical wound left foot   - s/p left partial 5th met resection due to positive margins 12/31/20  - s/p left partial 5th ray resection due to osteomyelitis 12/21/20    Plan:  - Patient is s/p LLE agram per IR - mid PT atherectomy & angioplasty performed w/ intact 2-vessel runoff via peroneal & PTa  Left foot appears baseline without acute infection, c/w lwc  Attending to round this afternoon   - Post-int LEADs: L nc/114/67   - NWB LLE  - Rest of care per primary and consulting teams    Subjective/Objective   Chief Complaint: No chief complaint on file  Subjective: 66 y o  y/o male was seen and evaluated at bedside  Feels ok, notes left foot pain  Blood pressure 146/77, pulse 67, temperature (!) 97 4 °F (36 3 °C), temperature source Tympanic, resp  rate 18, height 6' 4" (1 93 m), weight 106 kg (234 lb 5 6 oz), SpO2 98 %  ,Body mass index is 28 53 kg/m²  Invasive Devices     Peripheral Intravenous Line            Peripheral IV 02/09/21 Dorsal (posterior); Right Forearm 3 days    Peripheral IV 02/12/21 Right;Left Antecubital less than 1 day          Line            Arterial Sheath 6 Fr  Left Femoral less than 1 day                Physical Exam:   General: Alert, cooperative and no distress    RLE msk, derm and nvs baseline  LEFT lower Extremity: gross msk and neuro status baseline  Dopplerable DP & PT signals  Lateral foot wound with incisional mild dehiscence, fibrotic wound bed, no probe to bone  Wound is malodorous but without gross purulence or cellulitis  Intact sutures                 Lab, Imaging and other studies:   CBC: No results found for: WBC, HGB, HCT, MCV, PLT, ADJUSTEDWBC, MCH, MCHC, RDW, MPV, NRBC, CMP:   Lab Results   Component Value Date    SODIUM 137 02/12/2021    K 4 5 02/12/2021     02/12/2021    CO2 26 02/12/2021    BUN 29 (H) 02/12/2021    CREATININE 2 03 (H) 02/12/2021    CALCIUM 8 4 02/12/2021    EGFR 35 02/12/2021       Imaging: I have personally reviewed pertinent films in PACS  EKG, Pathology, and Other Studies: I have personally reviewed pertinent reports

## 2021-02-12 NOTE — NURSING NOTE
Pt c/o 10/10 pain in LLE  PRN pain medication given  IVF infusing as ordered  Pt voided yellow,clear urine in urinal  LLE elevated  Informed pt of NWB status on LLE- verbalized understanding  Call bell in reach  Will continue to monitor

## 2021-02-12 NOTE — QUICK NOTE
POST-OP CHECK    65-yr old male; s/p IR- L PT atherectomy + balloon angioplasty  Stable VS in room air  C/o: L foot pain; otherwise no new complaints  Exam:  Bilateral groin access sites; soft, overlying dressing dry  dopp PT/DP signal B/L  L foot wound dressing in place; foul-smelling  Sensory loss distally at the toes  PLAN:  - local wound care  - PRN and scheduled analgesia   - PT/OT

## 2021-02-12 NOTE — PROGRESS NOTES
Progress Note - Urology  Amanda Yip 1945, 66 y o  male MRN: [id number]    Unit/Bed#: E5 -01 Encounter: [id number]    Gross hematuria  Assessment & Plan  -Gross hematuria, managed with 24 Fr Wagner catheter manual irrigation and CBI 02/09/2021  -POD#2 cystoscopy fulguration of some bleeding areas within the prostate, no visual recurrence of bladder tumor; small clot evacuated  -Urine clear yellow postop, on slow CBI at present  -H&H stable  -urine clear yellow  -wagner discontinued  -reports dysuria  -urine culture pending  -can try pyridium for discomfort  -neosporin to tip of penis for comfort    Carcinoma in situ of bladder  Assessment & Plan  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  Oncology History:  1  TURBT (1994): pathology unavailable  2  Induction intravesical BCG (1994)  3  Induction intravesical BCG (1995)  4  TURBT (8/17/2016): TaHG + CIS  5  TURBT (12/13/2016): TaHG + CIS  6  Induction intravesical BCG (1/4/2017-2/08/2017)  7  TURBT (6/19/2017): TaHG  8  TURBT (4/26/2019): T1HG + CIS  9  Induction intravesical BCG (7/2019-) - received 4 treatments and stopped due to symptoms  10  TURBT (11/04/2019): TaHG   11  Induction intravesical BCG+INF (12/09/2019-1/28/2020)  12  Maintenance intravesical BCG+INF (6/09/2020-6/23/2020)  -status post cysto with biopsies, transurethral prostate biopsy and fulguration on 11/19/2020 by Dr Savage Pacheco   -bladder biopsies negative 11/19/2020      Urology will sign off but remain available for any further inpatient needs  Please feel free to contact the provider currently covering the [facility] role [facility] with questions or concerns  Subjective: reports dysuria, no blood  Voiding adequately    24 HR EVENTS:   no significant events  Patient has  complaints of dysuria         Review of Systems   Constitutional: Negative for activity change, appetite change, chills, fatigue, fever and unexpected weight change  HENT: Negative for facial swelling  Eyes: Negative for discharge  Respiratory: Negative  Negative for cough and shortness of breath  Cardiovascular: Negative for chest pain and leg swelling  Gastrointestinal: Negative  Negative for abdominal distention, abdominal pain, constipation, diarrhea, nausea and vomiting  Endocrine: Negative  Genitourinary: Positive for dysuria  Negative for decreased urine volume, difficulty urinating, enuresis, flank pain, frequency, hematuria and urgency  Musculoskeletal: Negative for back pain and myalgias  Skin: Negative for pallor and rash  Allergic/Immunologic: Negative  Negative for immunocompromised state  Neurological: Negative for facial asymmetry and speech difficulty  Psychiatric/Behavioral: Negative for agitation and confusion  Objective:  Nursing Rounds:   Vitals: Blood pressure 151/79, pulse 64, temperature 97 6 °F (36 4 °C), temperature source Temporal, resp  rate 18, height 6' 4" (1 93 m), weight 106 kg (234 lb 5 6 oz), SpO2 97 %  ,Body mass index is 28 53 kg/m²  INS & OUTS:  I/O last 24 hours: In: 240 [P O :240]  Out: 2100 [Urine:2100]    Physical Exam  Vitals signs and nursing note reviewed  Constitutional:       General: He is not in acute distress  Appearance: Normal appearance  He is not ill-appearing, toxic-appearing or diaphoretic  HENT:      Head: Normocephalic  Pulmonary:      Effort: Pulmonary effort is normal  No respiratory distress  Abdominal:      General: Abdomen is flat  There is no distension  Genitourinary:     Comments: Voiding without hematuria,  Reports some discomfort with voiding  Musculoskeletal:         General: No swelling  Skin:     General: Skin is warm and dry  Neurological:      General: No focal deficit present  Mental Status: He is alert and oriented to person, place, and time     Psychiatric:         Mood and Affect: Mood normal          Behavior: Behavior normal  Labs:  Recent Labs     02/10/21  0531 02/11/21  0508   WBC 7 24 9 84       Recent Labs     02/10/21  0531 02/11/21  0508   HGB 9 1* 9 7*     Recent Labs     02/10/21  0531 02/11/21  0508   HCT 27 3* 29 2*     Recent Labs     02/10/21  0531 02/11/21  0508 02/12/21  0449   CREATININE 2 05* 2 14* 2 03*     Lab Results   Component Value Date    HGB 9 7 (L) 02/11/2021    HCT 29 2 (L) 02/11/2021    WBC 9 84 02/11/2021     02/11/2021     Lab Results   Component Value Date     09/03/2015    K 4 5 02/12/2021     02/12/2021    CO2 26 02/12/2021    BUN 29 (H) 02/12/2021    CREATININE 2 03 (H) 02/12/2021    CALCIUM 8 4 02/12/2021    GLUCOSE 203 (H) 09/03/2015         History:    Past Medical History:   Diagnosis Date    Anemia     Last assessed: 9/28/17    Anxiety     Arteriosclerotic cardiovascular disease     Last assessed: 9/28/17    Arthritis     Bladder cancer ([facility])     bladder- had BCG treatments    Chronic kidney disease     CKD (chronic kidney disease) stage 4, GFR 15-29 ml/min ([facility])     Colon polyp     Coronary artery disease     7 stents    Depression     Diabetes mellitus (HCC)     IDDM    GERD (gastroesophageal reflux disease)     Glaucoma     History of fusion of cervical spine     Hyperlipidemia     Hypertension     Insomnia     Last assessed: 11/14/12    Loss of hearing     has hearing aids but usually does not wear them    Other seasonal allergic rhinitis     Last assessed: 2/10/16    PAD (peripheral artery disease) ([facility])     Shortness of breath     on exertion    Spinal stenosis of lumbar region     Transient cerebral ischemia     Uses walker     w/c for longer distances     Past Surgical History:   Procedure Laterality Date    CARDIAC SURGERY      Cath stent placement  Last assessed: 3/9/17  Interventional Catheterization    CHOLECYSTECTOMY      COLONOSCOPY      CYSTOSCOPY      Diagnostic w/biopsy  Glee Form   Last assessed: 12/1/14   [employer] CYSTOURETHROSCOPY      w/cautery  Colby Buffy    GASTRIC BYPASS      For morbid obesity w/Shaji-en-Y   Resolved: 11/17/09    INCISION AND DRAINAGE OF WOUND Right 2/26/2017    Procedure: INCISION AND DRAINAGE (I&D) EXTREMITY WITH APPLICATION OF GRAFT JACKET;  Surgeon: Wilmar Oden DPM;  [facility];  Service:     INCISION AND DRAINAGE OF WOUND Right 4/25/2017    Procedure: INCISION AND DRAINAGE (I&D) EXTREMITY, APPLICATION OF GRAFT;  Surgeon: Wilmar Oden DPM;  [facility];  Service:     IR BIOPSY OTHER  7/2/2020    IR LOWER EXTREMITY ANGIOGRAM  2/8/2021    IR TUNNELED CENTRAL LINE PLACEMENT  12/24/2020    JOINT REPLACEMENT      christofer knees replaced    NM AMPUTATION METATARSAL+TOE,SINGLE Left 12/21/2020    Procedure: RAY RESECTION FOOT;  Surgeon: Fred Celestin DPM;  [facility];  Service: Podiatry    NM AMPUTATION METATARSAL+TOE,SINGLE Left 12/31/2020    Procedure: 5TH MET RESECTION;  Surgeon: Ferd Celestin DPM;  [facility];  Service: Podiatry    NM CYSTOURETHROSCOPY W/IRRIG & EVAC CLOTS N/A 2/10/2021    Procedure: CYSTOSCOPY EVACUATION OF CLOTS, fulguration;  Surgeon: Sharlene Szymanski MD;  [facility];  Service: Urology    NM CYSTOURETHROSCOPY,BIOPSY N/A 8/16/2016    Procedure: CYSTOSCOPY WITH BIOPSIES;  Surgeon: María Elena Craig MD;  [facility];  Service: Urology    NM CYSTOURETHROSCOPY,FULGUR <0 5 CM LESN N/A 11/19/2020    Procedure: CYSTO W/BIOPSIES, transurethral prostate bx;  Surgeon: Miguelito Dale MD;  [facility];  Service: Urology    NM [address] 3RD+ ORD SLCTV ABDL PEL[facility] Left 2/8/2021    Procedure: LEG angiogram, CO2 w/limited contrast with balloon angioplasty postertior tibial artery;  Surgeon: Jose Manuel Wayne MD;  [facility];  Service: Vascular    ROTATOR CUFF REPAIR      SMALL INTESTINE SURGERY      Surgery Shaji-en-Y    SPINAL FUSION      lumbar and cervical fusions    VAC DRESSING APPLICATION Right 2017    Procedure: APPLICATION VAC DRESSING;  Surgeon: Alba Donato DPM;  [facility];  Service:      Family History   Problem Relation Age of Onset    Diabetes Mother     Heart disease Mother     Other Mother         High blood pressure    Heart disease Father     Diabetes Sister     Other Sister         High blood pressure    Kidney disease Sister     Heart disease Brother     Other Brother         High blood pressure     Social History     Socioeconomic History    Marital status: /Civil Union     Spouse name: None    Number of children: None    Years of education: None    Highest education level: None   Occupational History    None   Social Needs    Financial resource strain: None    Food insecurity     Worry: None     Inability: None    Transportation needs     Medical: None     Non-medical: None   Tobacco Use    Smoking status: Former Smoker     Quit date:      Years since quittin 1    Smokeless tobacco: Never Used   Substance and Sexual Activity    Alcohol use: Yes     Frequency: 2-3 times a week     Drinks per session: 1 or 2     Binge frequency: Never     Comment: beer / liquor    Drug use: Not Currently     Types: Marijuana     Comment: quit 2019 had medical marijuana    Sexual activity: Not Currently   Lifestyle    Physical activity     Days per week: None     Minutes per session: None    Stress: None   Relationships    Social connections     Talks on phone: None     Gets together: None     Attends Restorationist service: None     Active member of club or organization: None     Attends meetings of clubs or organizations: None     Relationship status: None    Intimate partner violence     Fear of current or ex partner: None     Emotionally abused: None     Physically abused: None     Forced sexual activity: None   Other Topics Concern    None   Social History Narrative    Consumes 1 cup of coffee and 1 soda per day       Chantel Reed EVANGELINA  Date: 2/12/2021 Time: 10:39 AM

## 2021-02-12 NOTE — CASE MANAGEMENT
CM spoke with patient and explained CM role  Patient admitted previously, CM confirmed the following:    Pt lives in a 2 story home with SO Tara Ambrose ([phone])  He reports that there are about 10 steps to enter the home  Pt report's he is in independent with his ADL's and SO assist when needed  Patient has a wheel chair and roller walker at home  Hx of STR at [facility] acute  [address] hx w/ [facility]  He uses [facility] in [facility]  PCP is ANTONIA  Pt denies any hx of MH or D&A  Pt denies having a POA  He has information and does not need any resources here  Pt is retired and drives  CM reviewed d/c planning process including the following: identifying help at home, patient preference for d/c planning needs, Discharge Lounge, [facility] Meds to Bed program, availability of treatment team to discuss questions or concerns patient and/or family may have regarding understanding medications and recognizing signs and symptoms once discharged  CM also encouraged patient to follow up with all recommended appointments after discharge  Patient advised of importance for patient and family to participate in managing patients medical well being

## 2021-02-12 NOTE — PROGRESS NOTES
Progress Note -    Oneil Habermann 66 y o  male MRN: [id number]  Unit/Bed#: E5 -01 Encounter: [id number]    Assessment:  65-yr old male with PAD; now s/p L PT atherectomy with angioplasty- IR 2/11  Stable VS in room air  dopp PT/DP signals bilaterally  Bilateral groin access sites soft  Sensory loss distally on the L foot (unchanged)  PLAN:  - will discuss with attending on possible need for post-angio LEADs/ABIs   - local wound care  - further nephro recs; prior recs appreciated  - disposition planning  Subjective:   - no new complaints  - no acute overnight events  Objective:     Vitals: Temp:  [96 8 °F (36 °C)-98 1 °F (36 7 °C)] 97 4 °F (36 3 °C)  HR:  [56-70] 67  Resp:  [12-18] 18  BP: (115-176)/(77-99) 146/77  Body mass index is 28 53 kg/m²  I/O       02/10 0701 - 02/11 0700 02/11 0701 - 02/12 0700    I V  (mL/kg) 1450 (13 7)     IV Piggyback 50     Total Intake(mL/kg) 1500 (14 2)     Urine (mL/kg/hr) 1000 (0 4) 900 (0 4)    Blood 0     Total Output 1000 900    Net +500 -900                Physical Exam:  GEN: NAD  HEENT: atraumatic  CV: RRR  Lung: Normal effort  Ab: Soft, NT/ND  Extrem:   L: foot dressing in place  dopp PT/DP  R: dopp PT/DP  Neuro: A+Ox3    Lab, Imaging and other studies: I have personally reviewed pertinent reports      VTE Pharmacologic Prophylaxis: Heparin  VTE Mechanical Prophylaxis: sequential compression device

## 2021-02-12 NOTE — DISCHARGE INSTRUCTIONS
Acute Hematuria   WHAT YOU SHOULD KNOW:   Hematuria is blood in your urine from an injury or medical condition  Acute means the problem starts suddenly, worsens quickly, and lasts a short time  Your urine may be bright red to dark brown  Some common causes of hematuria are bladder infection, kidney stone, trauma to the kidneys or bladder, and some medications  Sometimes blood clots can block the urethra so that you cannot empty your bladder  AFTER YOU LEAVE:   Medicines:  Ask about these or other medicines you may need to treat the cause of your acute hematuria:  · Antibiotics: This medicine is given to fight or prevent an infection caused by bacteria  Always take your antibiotics exactly as ordered by your healthcare provider  Do not stop taking your medicine unless directed by your healthcare provider  Never save antibiotics or take leftover antibiotics that were given to you for another illness  · Take your medicine as directed  Call your healthcare provider if you think your medicine is not helping or if you have side effects  Tell him if you are allergic to any medicine  Keep a list of the medicines, vitamins, and herbs you take  Include the amounts, and when and why you take them  Bring the list or the pill bottles to follow-up visits  Carry your medicine list with you in case of an emergency  Increase the amount of fluid you drink:  Drink clear fluids to help flush the blood from your urinary tract  Follow instructions about how much fluid to drink  Follow up with your healthcare provider as directed: Your healthcare provider will tell you how often to come in for follow-up visits  He may refer you to a specialist, such as a urologist or nephrologist  The specialists may do tests or procedures to find more serious problems with your urinary system  Write down your questions so you remember to ask them during your visits     Contact your healthcare provider if:   · You have a fever that gets worse or does not go away with treatment  · You cannot keep liquids or medicines down  · Your urine gets darker, even after you drink extra liquids  · You have questions or concerns about your condition, treatment, or care  Seek care immediately or call 911 if:   · You have blood in your urine after a new injury, such as a fall  · You are urinating very small amounts or not at all  · You feel like you cannot empty your bladder  · You have severe back or side pain that does not go away with treatment  © 2014 [employer] is for End User's use only and may not be sold, redistributed or otherwise used for commercial purposes  All illustrations and images included in CareNotes® are the copyrighted property of A D A M [employer] Inc  or Salvador Cooper  The above information is an  only  It is not intended as medical advice for individual conditions or treatments  Talk to your doctor, nurse or pharmacist before following any medical regimen to see if it is safe and effective for you  ARTERIOGRAM    WHAT YOU SHOULD KNOW:   An angiogram is a procedure to look at arteries in your body  Arteries are the blood vessels that carry blood from your heart to your body  AFTER YOU LEAVE:     Self-care:   · Limit activity: Rest for the remainder of the day of your procedure  Have some one with you until the next morning  Keep your arm or leg straight as much as possible  Rest as much as possible, sitting lying or reclining  Walk only to go to the bathroom, to bed or to eat  If the angiogram catheter was put in your leg, use the stairs as little as possible  No driving  · Keep your wound clean and dry  You may shower 24 hours after your procedure  The bandage you have on should fall off in 2-3 days  If there is any drainage from the puncture site, you should put on a clean bandage  · Watch for bleeding and bruising:  It is normal to have a bruise and soreness where the angiogram catheter went in  · Diet:   · You may resume your regular diet, Sips of flat soda will help with mild nausea  · Drink more liquids than usual for the next 24 hours      · IMMEDIATELY Contact Interventional Radiology at [phone] [place] PATIENTS: Contact Interventional Radiology at [phone]) [place] PATIENTS: Contact Interventional Radiology at [phone]) if any of the following occur:  · If your bruise gets larger or if you notice any active bleeding  APPLY DIRECT PRESSURE TO THE BLEEDING SITE  · If you notice increased swelling or have increased pain at the puncture site   · If you have any numbness or pain in the extremity of the puncture site   · If that extremity seems cold or pale  · You have fever greater than 101  · Persistent nausea or vomitting    Follow up with your primary healthcare provider  as directed: Write down your questions so you remember to ask them during your visits          Procedural Sedation   WHAT YOU NEED TO KNOW:   Procedural sedation is medicine used during procedures to help you feel relaxed and calm  You will remember little to none of the procedure  After sedation you may feel tired, weak, or unsteady on your feet  You may also have trouble concentrating or short-term memory loss  These symptoms should go away in 24 hours or less  DISCHARGE INSTRUCTIONS:     Call 911 or have someone else call for any of the following:   · You have sudden trouble breathing      · You cannot be woken        Contact Interventional Radiology at [phone]   [place] PATIENTS: Contact Interventional Radiology at [phone]) [place] PATIENTS: Contact Interventional Radiology at [phone]) if any of the following occur:     · You have a severe headache or dizziness      · Your heart is beating faster than usual     · You have a fever or chills      · Your skin is itchy, swollen, or you have a rash      · You have nausea or are vomiting for more than 8 hours after the procedure       · You have questions or concerns about your condition or care  Self-care:   · Have someone stay with you for 24 hours  This person can drive you to errands and help you do things around the house  This person can also watch for problems       · Rest and do quiet activities for 24 hours  Do not exercise, ride a bike, or play sports  Stand up slowly to prevent dizziness and falls  Take short walks around the house with another person  Slowly return to your usual activities the next day       · Do not drive or use dangerous machines or tools for 24 hours  You may injure yourself or others  Examples include a lawnmower, saw, or drill  Do not return to work for 24 hours if you use dangerous machines or tools for work       · Do not make important decisions for 24 hours  For example, do not sign important papers or invest money       · Drink liquids as directed  Liquids help flush the sedation medicine out of your body  Ask how much liquid to drink each day and which liquids are best for you       · Eat small, frequent meals to prevent nausea and vomiting  Start with clear liquids such as juice or broth  If you do not vomit after clear liquids, you can eat your usual foods       · Do not drink alcohol or take medicines that make you drowsy  This includes medicines that help you sleep and anxiety medicines  Ask your healthcare provider if it is safe for you to take pain medicine  Follow up with your healthcare provider as directed: Write down your questions so you remember to ask them during your visits  Discharge Instructions - Podiatry    Weight Bearing Status: Non weight bearing left foot    Follow-up appointment instructions: Please make an appointment within one week of discharge with Dr Tori Carolina   Contact sooner if any increase in pain, or signs of infection occur    Wound Care: Leave dressing clean, dry and intact until clinic visit with Dr Tori Carolina

## 2021-02-12 NOTE — DISCHARGE SUMMARY
Discharge Summary - Aly Gannon 66 y o  male MRN: [id number]    Unit/Bed#: E5 -01 Encounter: [id number]    Admission Date:   Admission Orders (From admission, onward)     Ordered        02/08/21 1618  Inpatient Admission  Once         02/07/21 1356  Place in Observation  Once                     Admitting Diagnosis: Asymptomatic PVD (peripheral vascular disease) ([facility] [address] ) [I73 9]    HPI: Mr Kourtney Robles is a 64yo male with poor healing of the left 5th metatarsal resection site  Arterial duplex had suggested adequate healing pressures and no significant arterial disease prior to amputation  However, due to poor healing status, a left lower extremity angiogram with possible intervention is indicated  All risks and benefits of the procedure were discussed with the patient and informed consent was obtained in the office  He presents for pre-hydration due to significant CKD per nephrology recommendations with plan for CO2 and limited contrast use during angiogram     Procedures Performed:   Orders Placed This Encounter   Procedures    Bladder catheterization       Summary of Hospital Course: Patient was admitted for preop hydration per nephrology recommendations  Patient underwent LLE arteriogram with PT angiopalsty  With minmal response  Following this procedure the patient was noted to have gross hematuria  A urology consult was placed and the patient underwent CBI and subsequent cystoscopy with cautery of urthera and posterior bladder wall  Hematuria resolved and wagner was removed  Patient then underwent antegrade angiogram with atherectomy and angioplasty of PT with good result  Patient was cleared for discharge on 2/12       Significant Findings, Care, Treatment and Services Provided: see above     Complications: non immediate    Discharge Diagnosis: non healing left foot wound    Resolved Problems  Date Reviewed: 1/28/2021    None          Condition at Discharge: good         Discharge instructions/Information to patient and family:   See after visit summary for information provided to patient and family  Provisions for Follow-Up Care:  See after visit summary for information related to follow-up care and any pertinent home health orders  PCP: Kurt Rivera MD    Disposition: See After Visit Summary for discharge disposition information  Planned Readmission: No      Discharge Statement   I spent 30 minutes discharging the patient  This time was spent on the day of discharge  I had direct contact with the patient on the day of discharge  Additional documentation is required if more than 30 minutes were spent on discharge  Discharge Medications:  See after visit summary for reconciled discharge medications provided to patient and family

## 2021-02-12 NOTE — CASE MANAGEMENT
Patient is written for discharge  Patient states he was not aware he would be discharged today, states Podiatry said they needed to see him Monday while in hospital  CM unaware patient would discharge today, and was not provided any information  CM sent tiger text to [address] [facility] resident asking what patient needs for discharge, CM was advised wound care  CM requested wound care orders/instructions be added to the AVS  CM called [facility] [phone]  CM left voicemail asking if they can resume care  Patient is a moderate level risk for readmission  CM sent request to PCP via in basket messaging for follow up appointment

## 2021-02-13 DIAGNOSIS — I25.10 CORONARY ARTERY DISEASE INVOLVING NATIVE HEART WITHOUT ANGINA PECTORIS, UNSPECIFIED VESSEL OR LESION TYPE: ICD-10-CM

## 2021-02-13 LAB
ANION GAP SERPL CALCULATED.3IONS-SCNC: 9 MMOL/L (ref 4–13)
BUN SERPL-MCNC: 30 MG/DL (ref 5–25)
CALCIUM SERPL-MCNC: 8.7 MG/DL (ref 8.3–10.1)
CHLORIDE SERPL-SCNC: 103 MMOL/L (ref 100–108)
CO2 SERPL-SCNC: 26 MMOL/L (ref 21–32)
CREAT SERPL-MCNC: 2.04 MG/DL (ref 0.6–1.3)
GFR SERPL CREATININE-BSD FRML MDRD: 35 ML/MIN/1.73SQ M
GLUCOSE SERPL-MCNC: 107 MG/DL (ref 65–140)
GLUCOSE SERPL-MCNC: 128 MG/DL (ref 65–140)
GLUCOSE SERPL-MCNC: 142 MG/DL (ref 65–140)
GLUCOSE SERPL-MCNC: 99 MG/DL (ref 65–140)
POTASSIUM SERPL-SCNC: 4.3 MMOL/L (ref 3.5–5.3)
PREALB SERPL-MCNC: 20.3 MG/DL (ref 18–40)
SODIUM SERPL-SCNC: 138 MMOL/L (ref 136–145)

## 2021-02-13 PROCEDURE — 82948 REAGENT STRIP/BLOOD GLUCOSE: CPT

## 2021-02-13 PROCEDURE — 80048 BASIC METABOLIC PNL TOTAL CA: CPT | Performed by: NURSE PRACTITIONER

## 2021-02-13 PROCEDURE — 84134 ASSAY OF PREALBUMIN: CPT | Performed by: PODIATRIST

## 2021-02-13 PROCEDURE — 99232 SBSQ HOSP IP/OBS MODERATE 35: CPT | Performed by: INTERNAL MEDICINE

## 2021-02-13 RX ORDER — FUROSEMIDE 20 MG/1
TABLET ORAL
Qty: 90 TABLET | Refills: 1 | Status: SHIPPED | OUTPATIENT
Start: 2021-02-13 | End: 2021-04-28 | Stop reason: SDUPTHER

## 2021-02-13 RX ADMIN — DOCUSATE SODIUM 100 MG: 100 CAPSULE, LIQUID FILLED ORAL at 09:24

## 2021-02-13 RX ADMIN — FLUTICASONE PROPIONATE 1 SPRAY: 50 SPRAY, METERED NASAL at 21:47

## 2021-02-13 RX ADMIN — SERTRALINE HYDROCHLORIDE 50 MG: 50 TABLET ORAL at 09:24

## 2021-02-13 RX ADMIN — FLUTICASONE PROPIONATE 1 SPRAY: 50 SPRAY, METERED NASAL at 09:25

## 2021-02-13 RX ADMIN — CEFAZOLIN SODIUM 2000 MG: 2 SOLUTION INTRAVENOUS at 05:20

## 2021-02-13 RX ADMIN — LATANOPROST 1 DROP: 50 SOLUTION OPHTHALMIC at 21:47

## 2021-02-13 RX ADMIN — GABAPENTIN 600 MG: 300 CAPSULE ORAL at 09:24

## 2021-02-13 RX ADMIN — POLYETHYLENE GLYCOL 3350 17 G: 17 POWDER, FOR SOLUTION ORAL at 09:24

## 2021-02-13 RX ADMIN — HEPARIN SODIUM 5000 UNITS: 5000 INJECTION INTRAVENOUS; SUBCUTANEOUS at 05:20

## 2021-02-13 RX ADMIN — PANTOPRAZOLE SODIUM 40 MG: 40 TABLET, DELAYED RELEASE ORAL at 05:20

## 2021-02-13 RX ADMIN — ISOSORBIDE MONONITRATE 30 MG: 30 TABLET, EXTENDED RELEASE ORAL at 09:24

## 2021-02-13 RX ADMIN — HEPARIN SODIUM 5000 UNITS: 5000 INJECTION INTRAVENOUS; SUBCUTANEOUS at 14:31

## 2021-02-13 RX ADMIN — OXYCODONE HYDROCHLORIDE 5 MG: 5 TABLET ORAL at 18:14

## 2021-02-13 RX ADMIN — ASPIRIN 81 MG: 81 TABLET, CHEWABLE ORAL at 09:24

## 2021-02-13 RX ADMIN — ACETAMINOPHEN 650 MG: 325 TABLET ORAL at 18:17

## 2021-02-13 RX ADMIN — INSULIN GLARGINE 10 UNITS: 100 INJECTION, SOLUTION SUBCUTANEOUS at 21:47

## 2021-02-13 RX ADMIN — ACETAMINOPHEN 650 MG: 325 TABLET ORAL at 06:21

## 2021-02-13 RX ADMIN — METOPROLOL SUCCINATE 100 MG: 50 TABLET, EXTENDED RELEASE ORAL at 09:24

## 2021-02-13 RX ADMIN — CEFAZOLIN SODIUM 2000 MG: 2 SOLUTION INTRAVENOUS at 21:45

## 2021-02-13 RX ADMIN — CEFAZOLIN SODIUM 2000 MG: 2 SOLUTION INTRAVENOUS at 14:28

## 2021-02-13 RX ADMIN — OXYCODONE HYDROCHLORIDE 5 MG: 5 TABLET ORAL at 06:22

## 2021-02-13 RX ADMIN — HEPARIN SODIUM 5000 UNITS: 5000 INJECTION INTRAVENOUS; SUBCUTANEOUS at 21:47

## 2021-02-13 RX ADMIN — TAMSULOSIN HYDROCHLORIDE 0.4 MG: 0.4 CAPSULE ORAL at 21:47

## 2021-02-13 NOTE — PROGRESS NOTES
[facility]   Fabrizio Barker 66 y o  male MRN: [id number]  Unit/Bed#: E5 -01 Encounter: [id number]  Reason for Consult:  CKD stage 4    ASSESSMENT and PLAN:  47-year-old with a history of hypertension, hyperlipidemia, arthritis, diabetes mellitus, CAD and CKD admitted for poor healing of left 5th metatarsal resection site  Nephrology consulted for perioperative optimization for prevention of acute kidney injury  1  Chronic kidney disease, stage IV:  · Baseline creatinine 2 2-2 5 mg/dL  · Creatinine 2 04  Slightly below baseline since 2/8  · Renal function stable  · Etiology of CKD:  Diabetic nephropathy, +/-hypertensive nephrosclerosis, +/-renovascular disease  · Follows with [facility]  · Gross hematuria -Status post cystoscopy 2/10 and fulguration day #2  · Agram 2/8 with repeat a gram and intervention 2/11  2  Gross hematuria:  Resolved  Urology following   · History of bladder cancer  · Postop day cystoscopy and fulguration, bleeding areas noted around the prostate  No visual evidence of recurrence of bladder tumor  Evacuation of small clot  3 way irrigation  3  Peripheral vascular disease, poor healing of left 5th metatarsal resection site:  4  Essential hypertension:  Blood pressure acceptable on Imdur and Toprol  5  Anemia in the setting of CKD:  Hemoglobin 9 7, stable  6  Diabetes mellitus:  Management per primary team    DISPOSITION:  Stable from a renal standpoint  Follow-up with [facility]    SUBJECTIVE / 24H INTERVAL HISTORY:  No acute complaints  Patient tells me he can not go home because he has a procedure next week on his foot  According to the chart he was discharged      OBJECTIVE:  Current Weight: Weight - Scale: 107 kg (235 lb 14 3 oz)  Vitals:    02/13/21 0335 02/13/21 0600 02/13/21 0750 02/13/21 1156   BP: 120/90  128/66 109/66   BP Location: Left arm  Right arm Right arm   Pulse: 66  70 65   Resp: 18  18 18   Temp: 98 2 °F (36 8 °C)  98 5 °F (36 9 °C) 97 6 °F (36 4 °C)   TempSrc: Temporal  Temporal Temporal   SpO2: 96%  96% 98%   Weight:  107 kg (235 lb 14 3 oz)     Height:           Intake/Output Summary (Last 24 hours) at 2/13/2021 1517  Last data filed at 2/13/2021 1250  Gross per 24 hour   Intake 340 ml   Output 200 ml   Net 140 ml     General: NAD, sitting out of bed in the chair  Chronically ill-appearing  Skin: no rash  Eyes: anicteric sclera  ENT: moist mucous membrane  Neck: supple  Chest: CTA b/l, no ronchii, no wheeze, no rubs, no rales  Normal effort at rest  CVS: s1s2, no murmur, no gallop, no rub    Normal rate, regular rhythm  Abdomen: soft, nontender, nl sounds  Extremities: no edema LE b/l, left foot wrapped  :  wagner  Neuro: AAOX3  Psych: normal affect  Medications:    Current Facility-Administered Medications:     acetaminophen (TYLENOL) tablet 650 mg, 650 mg, Oral, Q6H PRN, Natividad Shelley MD, 650 mg at 02/13/21 1527    ALPRAZolam Alyssa Buys) tablet 0 25 mg, 0 25 mg, Oral, BID PRN, Natividad Shelley MD    aspirin chewable tablet 81 mg, 81 mg, Oral, Daily, Natividad Shelley MD, 81 mg at 02/13/21 1340    bismuth subsalicylate (PEPTO BISMOL) oral suspension 524 mg, 524 mg, Oral, Q6H PRN, Natividad Shelley MD    ceFAZolin (ANCEF) IVPB (premix in dextrose) 2,000 mg 50 mL, 2,000 mg, Intravenous, Q8H, Natividad Shelley MD, Last Rate: 100 mL/hr at 02/13/21 1428, 2,000 mg at 02/13/21 1428    docusate sodium (COLACE) capsule 100 mg, 100 mg, Oral, BID, Natividad Shelley MD, 100 mg at 02/13/21 0924    fluticasone (FLONASE) 50 mcg/act nasal spray 1 spray, 1 spray, Each Nare, BID, Natividad Shelley MD, 1 spray at 02/13/21 4771    gabapentin (NEURONTIN) capsule 600 mg, 600 mg, Oral, Daily, Natividad Shelley MD, 600 mg at 02/13/21 0924    heparin (porcine) subcutaneous injection 5,000 Units, 5,000 Units, Subcutaneous, Q8H [facility], 5,000 Units at 02/13/21 1431 **AND** [COMPLETED] Platelet count, , , Once, Eugenie Mcnulty MD    insulin glargine (LANTUS) subcutaneous injection 10 Units 0 1 mL, 10 Units, Subcutaneous, HS, Magnolia Cisneros MD, 10 Units at 02/12/21 2132    insulin lispro (HumaLOG) 100 units/mL subcutaneous injection 1-6 Units, 1-6 Units, Subcutaneous, TID AC, 1 Units at 02/10/21 1657 **AND** Fingerstick Glucose (POCT), , , TID AC, Magnolia Cisneros MD    isosorbide mononitrate (IMDUR) 24 hr tablet 30 mg, 30 mg, Oral, Daily, Magnolia Cisneros MD, 30 mg at 02/13/21 0924    latanoprost (XALATAN) 0 005 % ophthalmic solution 1 drop, 1 drop, Both Eyes, HS, aMgnolia Cisneros MD, 1 drop at 02/12/21 2132    metoprolol succinate (TOPROL-XL) 24 hr tablet 100 mg, 100 mg, Oral, Daily, Magonlia Cisneros MD, 100 mg at 02/13/21 9214    oxyCODONE (ROXICODONE) IR tablet 5 mg, 5 mg, Oral, Q4H PRN, Magnolia Cisneros MD, 5 mg at 02/13/21 0622    pantoprazole (PROTONIX) EC tablet 40 mg, 40 mg, Oral, Early Morning, Magnolia Cisneros MD, 40 mg at 02/13/21 0520    polyethylene glycol (MIRALAX) packet 17 g, 17 g, Oral, Daily, Aditya Walker DO, 17 g at 02/13/21 0777    sertraline (ZOLOFT) tablet 50 mg, 50 mg, Oral, Daily, Magnolia Cisneros MD, 50 mg at 02/13/21 0924    tamsulosin (FLOMAX) capsule 0 4 mg, 0 4 mg, Oral, HS, Magnolia Cisneros MD, 0 4 mg at 02/12/21 2132    zolpidem (AMBIEN) tablet 5 mg, 5 mg, Oral, HS PRN, Magnolia Cisneros MD, 5 mg at 02/12/21 2132    Laboratory Results:  Results from last 7 days   Lab Units 02/13/21  0544 02/12/21  0449 02/11/21  0508 02/10/21  0531 02/09/21  0806 02/09/21  0458 02/08/21  1616 02/08/21  0611 02/07/21  1458   WBC Thousand/uL  --   --  9 84 7 24 7 78  --   --  6 74  --    HEMOGLOBIN g/dL  --   --  9 7* 9 1* 9 5*  --  9 2* 10 4*  --    HEMATOCRIT %  --   --  29 2* 27 3* 28 4*  --  28 0* 31 4*  --    PLATELETS Thousands/uL  --   --  180 157 154  --   --  164 203   POTASSIUM mmol/L 4 3 4 5 4 6 4 3  --  4 5 5 4* 4 5  --    CHLORIDE mmol/L 103 104 104 107  --  106 106 106  --    CO2 mmol/L 26 26 23 23  --  23 26 24  --    BUN mg/dL 30* 29* 30* 32*  --  33* 35* 38*  --    CREATININE mg/dL 2 04* 2 03* 2 14* 2 05*  --  1 96* 2 02* 2 03*  --    CALCIUM mg/dL 8 7 8 4 8 4 8 4  --  7 9* 7 9* 8 3  --    MAGNESIUM mg/dL  --  1 9 1 9  --   --   --   --   --   --    PHOSPHORUS mg/dL  --  3 7 3 6  --   --   --   --   --   --

## 2021-02-13 NOTE — PROGRESS NOTES
Podiatry - Progress Note  Patient: Oneil Habermann 66 y o  male   MRN: [id number]  PCP: Angela Doherty MD  Unit/Bed#: E5 -49 Encounter: [id number]  Date Of Visit: 21    ASSESSMENT:    Oneil Habermann is a 66 y o  male with:    1  Non-healing left foot surgical wound - POA  2  PAD  3  T2DM  4  Htn  5  Hyperlipidemia  6  CAD  7  CKD stage 3    PLAN:    · Local wound care consisting of maxorb Ag, DSD performed today  · Patient to undergo L foot wound debridement with graft application likely Tuesday, 2021  · Appreciate nephrology consult  · Elevation on green foam wedges or pillows when non-ambulatory  · Appreciate consulting services for recommendations and management  Antibiotics started: Ancef  Pharmacologic VTE Prophylaxis: Sequential compression device (Venodyne)    Mechanical VTE Prophylaxis: sequential compression device   Weightbearing status: NWB LLE    Disposition:  Patient requires continued stay for IV antibiotics and eventual surgical intervention to left foot  SUBJECTIVE:     The patient was seen, evaluated, and assessed at bedside today  The patient was awake, alert, and in no acute distress  No acute events overnight  The patient reports no pain to his LLE  Patient denies N/V/F/chills/SOB/CP  OBJECTIVE:     Vitals:   /83 (BP Location: Right arm)   Pulse 65   Temp 97 5 °F (36 4 °C) (Temporal)   Resp 18   Ht 6' 4" (1 93 m)   Wt 107 kg (235 lb 14 3 oz)   SpO2 98%   BMI 28 71 kg/m²     Temp (24hrs), Av 1 °F (36 7 °C), Min:97 5 °F (36 4 °C), Max:98 8 °F (37 1 °C)      Physical Exam:     General: Alert, cooperative and no distress  Lungs: Non labored breathing  Abdomen: Soft, non-tender  Lower extremity exam:  Cardiovascular status at baseline  Neurological status at baseline  Musculoskeletal status at baseline  No calf tenderness noted  Left foot lateral surgical wound remains dehisced  Sutures that are present are intact   Wound bed is mostly fibrotic, but does not probe to bone at this time  No local signs of infection at this time  Additional Data:     Labs:    Results from last 7 days   Lab Units 02/11/21  0508   WBC Thousand/uL 9 84   HEMOGLOBIN g/dL 9 7*   HEMATOCRIT % 29 2*   PLATELETS Thousands/uL 180   NEUTROS PCT % 69   LYMPHS PCT % 21   MONOS PCT % 7   EOS PCT % 3     Results from last 7 days   Lab Units 02/13/21  0544   POTASSIUM mmol/L 4 3   CHLORIDE mmol/L 103   CO2 mmol/L 26   BUN mg/dL 30*   CREATININE mg/dL 2 04*   CALCIUM mg/dL 8 7     Results from last 7 days   Lab Units 02/08/21  0711   INR  1 06       * I Have Reviewed All Lab Data Listed Above  Recent Cultures (last 7 days):     Results from last 7 days   Lab Units 02/11/21  0514 02/09/21  1046   URINE CULTURE  No Growth <1000 cfu/mL <10,000 cfu/ml Staphylococcus coagulase negative*           Imaging: I have personally reviewed pertinent films in PACS  EKG, Pathology, and Other Studies: I have personally reviewed pertinent reports  ** Please Note: Portions of the record may have been created with voice recognition software  Occasional wrong word or "sound a like" substitutions may have occurred due to the inherent limitations of voice recognition software  Read the chart carefully and recognize, using context, where substitutions have occurred   **

## 2021-02-14 LAB
GLUCOSE SERPL-MCNC: 107 MG/DL (ref 65–140)
GLUCOSE SERPL-MCNC: 152 MG/DL (ref 65–140)
GLUCOSE SERPL-MCNC: 188 MG/DL (ref 65–140)
GLUCOSE SERPL-MCNC: 204 MG/DL (ref 65–140)

## 2021-02-14 PROCEDURE — 82948 REAGENT STRIP/BLOOD GLUCOSE: CPT

## 2021-02-14 PROCEDURE — 99232 SBSQ HOSP IP/OBS MODERATE 35: CPT | Performed by: INTERNAL MEDICINE

## 2021-02-14 PROCEDURE — 99223 1ST HOSP IP/OBS HIGH 75: CPT | Performed by: STUDENT IN AN ORGANIZED HEALTH CARE EDUCATION/TRAINING PROGRAM

## 2021-02-14 RX ADMIN — DOCUSATE SODIUM 100 MG: 100 CAPSULE, LIQUID FILLED ORAL at 17:03

## 2021-02-14 RX ADMIN — HEPARIN SODIUM 5000 UNITS: 5000 INJECTION INTRAVENOUS; SUBCUTANEOUS at 14:58

## 2021-02-14 RX ADMIN — TAMSULOSIN HYDROCHLORIDE 0.4 MG: 0.4 CAPSULE ORAL at 21:22

## 2021-02-14 RX ADMIN — CEFAZOLIN SODIUM 2000 MG: 2 SOLUTION INTRAVENOUS at 21:22

## 2021-02-14 RX ADMIN — ISOSORBIDE MONONITRATE 30 MG: 30 TABLET, EXTENDED RELEASE ORAL at 08:23

## 2021-02-14 RX ADMIN — ASPIRIN 81 MG: 81 TABLET, CHEWABLE ORAL at 08:23

## 2021-02-14 RX ADMIN — CEFAZOLIN SODIUM 2000 MG: 2 SOLUTION INTRAVENOUS at 14:58

## 2021-02-14 RX ADMIN — LATANOPROST 1 DROP: 50 SOLUTION OPHTHALMIC at 21:23

## 2021-02-14 RX ADMIN — SERTRALINE HYDROCHLORIDE 50 MG: 50 TABLET ORAL at 08:23

## 2021-02-14 RX ADMIN — ZOLPIDEM TARTRATE 5 MG: 5 TABLET, COATED ORAL at 21:23

## 2021-02-14 RX ADMIN — ACETAMINOPHEN 650 MG: 325 TABLET ORAL at 17:30

## 2021-02-14 RX ADMIN — INSULIN LISPRO 1 UNITS: 100 INJECTION, SOLUTION INTRAVENOUS; SUBCUTANEOUS at 08:28

## 2021-02-14 RX ADMIN — PANTOPRAZOLE SODIUM 40 MG: 40 TABLET, DELAYED RELEASE ORAL at 05:30

## 2021-02-14 RX ADMIN — INSULIN GLARGINE 10 UNITS: 100 INJECTION, SOLUTION SUBCUTANEOUS at 21:22

## 2021-02-14 RX ADMIN — GABAPENTIN 600 MG: 300 CAPSULE ORAL at 08:23

## 2021-02-14 RX ADMIN — ACETAMINOPHEN 650 MG: 325 TABLET ORAL at 08:23

## 2021-02-14 RX ADMIN — INSULIN LISPRO 1 UNITS: 100 INJECTION, SOLUTION INTRAVENOUS; SUBCUTANEOUS at 11:45

## 2021-02-14 RX ADMIN — METOPROLOL SUCCINATE 100 MG: 50 TABLET, EXTENDED RELEASE ORAL at 08:23

## 2021-02-14 RX ADMIN — FLUTICASONE PROPIONATE 1 SPRAY: 50 SPRAY, METERED NASAL at 08:31

## 2021-02-14 RX ADMIN — CEFAZOLIN SODIUM 2000 MG: 2 SOLUTION INTRAVENOUS at 05:30

## 2021-02-14 RX ADMIN — DOCUSATE SODIUM 100 MG: 100 CAPSULE, LIQUID FILLED ORAL at 08:23

## 2021-02-14 RX ADMIN — HEPARIN SODIUM 5000 UNITS: 5000 INJECTION INTRAVENOUS; SUBCUTANEOUS at 21:22

## 2021-02-14 RX ADMIN — OXYCODONE HYDROCHLORIDE 5 MG: 5 TABLET ORAL at 08:23

## 2021-02-14 RX ADMIN — HEPARIN SODIUM 5000 UNITS: 5000 INJECTION INTRAVENOUS; SUBCUTANEOUS at 05:30

## 2021-02-14 NOTE — ASSESSMENT & PLAN NOTE
Patient presents with left lower extremity poor wound healing  Vascular initially following  s/p L PT atherectomy with angioplasty on 02/11  Podiatry primary  Planning for left lower extremity debridement with graphix application on 61/23  IV cefazolin

## 2021-02-14 NOTE — PROGRESS NOTES
[facility] Podiatry - Progress Note  Patient: Kj Zimmer 66 y o  male   MRN: [id number]  PCP: Yeni Burgos MD  Unit/Bed#: E5 -17 Encounter: [id number]  Date Of Visit: 21    ASSESSMENT:    Kj Zimmer is a 66 y o  male with:    1  Non-healing left foot surgical wound - POA  2  PAD  3  T2DM  4  Htn  5  Hyperlipidemia  6  CAD  7  CKD stage 3      PLAN:    · Will undergo debridement with graphix application  with Dr Becka Acuna  · Improved bloodflow s/p angioplasty and atherectomy left mid posterior tibial artery with intact PT/peroneal trunk without complete peroneal to the level of the ankle  Improvement in toe pressure and met pressure with intervention  · NWB LLE  · Has been on ancef q8 hours since urologic procedure  Urology has signed off but unsure if there was concern for residual infection  Will continue until procedure and discontinue following  · Continue subq heparin for DVT ppx  · Continue 81 mg aspirin for antiplatelet therapy s/p angioplasty and atherectomy  Will defer to vascular surgery for DAPT indications  · Continue insulin regimine with SLIM input as necessary   · Encouraged elevation of the left foot for edema control         SUBJECTIVE:     The patient was seen, evaluated, and assessed at bedside today  The patient was awake, alert, and in no acute distress  No acute events overnight  The patient reports some pain of the left foot intermittently  This pain is resolved with percocet  He is in good spirits with regards to his condition  Patient denies N/V/F/chills/SOB/CP  OBJECTIVE:     Vitals:   /77 (BP Location: Left arm)   Pulse 68   Temp 97 8 °F (36 6 °C) (Temporal)   Resp 18   Ht 6' 4" (1 93 m)   Wt 107 kg (236 lb 5 3 oz)   SpO2 99%   BMI 28 77 kg/m²     Temp (24hrs), Av 6 °F (36 4 °C), Min:97 3 °F (36 3 °C), Max:97 9 °F (36 6 °C)      Physical Exam  Vitals signs and nursing note reviewed     Constitutional:       General: He is not in acute distress  Appearance: He is not ill-appearing, toxic-appearing or diaphoretic  HENT:      Head: Normocephalic and atraumatic  Nose: Nose normal    Eyes:      Pupils: Pupils are equal, round, and reactive to light  Cardiovascular:      Rate and Rhythm: Regular rhythm  Pulmonary:      Effort: Pulmonary effort is normal    Abdominal:      Palpations: Abdomen is soft  Skin:     General: Skin is warm  Neurological:      Mental Status: He is alert and oriented to person, place, and time  Mental status is at baseline  Psychiatric:         Mood and Affect: Mood normal          Behavior: Behavior normal          Thought Content: Thought content normal          Judgment: Judgment normal       :     General:  Alert, cooperative, and in no distress  Lower extremity exam:  Cardiovascular status at baseline  Neurological status at baseline  Musculoskeletal status at baseline  No calf tenderness noted bilaterally  Wound (1) located left lateral foot, measures approximately 12 cm x 0 3 cm x 0 3 cm  with sinus tracking or undermining  Wound bed appears slough and fibrotic with moderate serous drainage  Deepest tissue noted in base is unknown  Malodor is not noticed  Wound edge appears non-attached  Chayo-wound skin appears intact and indurated  Probe to bone is negative   Signs of infection are not present at this time       Clinical Images 02/14/21:      Left lateral foot    Additional Data:     Labs:    Results from last 7 days   Lab Units 02/11/21  0508   WBC Thousand/uL 9 84   HEMOGLOBIN g/dL 9 7*   HEMATOCRIT % 29 2*   PLATELETS Thousands/uL 180   NEUTROS PCT % 69   LYMPHS PCT % 21   MONOS PCT % 7   EOS PCT % 3     Results from last 7 days   Lab Units 02/13/21  0544   POTASSIUM mmol/L 4 3   CHLORIDE mmol/L 103   CO2 mmol/L 26   BUN mg/dL 30*   CREATININE mg/dL 2 04*   CALCIUM mg/dL 8 7     Results from last 7 days   Lab Units 02/08/21  0711   INR  1 06       * I Have Reviewed All Lab Data Listed Above     Recent Cultures (last 7 days):     Results from last 7 days   Lab Units 02/11/21  0514 02/09/21  1046   URINE CULTURE  No Growth <1000 cfu/mL <10,000 cfu/ml Staphylococcus coagulase negative*           Imaging: I have personally reviewed pertinent films in PACS  Pathology, and Other Studies: I have personally reviewed pertinent reports  ** Please Note: Portions of the record may have been created with voice recognition software  Occasional wrong word or "sound a like" substitutions may have occurred due to the inherent limitations of voice recognition software  Read the chart carefully and recognize, using context, where substitutions have occurred   **

## 2021-02-14 NOTE — ASSESSMENT & PLAN NOTE
Lab Results   Component Value Date    EGFR 35 02/13/2021    EGFR 35 02/12/2021    EGFR 33 02/11/2021    CREATININE 2 04 (H) 02/13/2021    CREATININE 2 03 (H) 02/12/2021    CREATININE 2 14 (H) 02/11/2021     Creatinine at baseline  Nephrology following

## 2021-02-14 NOTE — PLAN OF CARE
Problem: Potential for Falls  Goal: Patient will remain free of falls  Description: INTERVENTIONS:  - Assess patient frequently for physical needs  -  Identify cognitive and physical deficits and behaviors that affect risk of falls  -  [place] fall precautions as indicated by assessment   - Educate patient/family on patient safety including physical limitations  - Instruct patient to call for assistance with activity based on assessment  - Modify environment to reduce risk of injury  - Consider OT/PT consult to assist with strengthening/mobility  Outcome: Progressing     Problem: NEUROSENSORY - ADULT  Goal: Achieves stable or improved neurological status  Description: INTERVENTIONS  - Monitor and report changes in neurological status  - Monitor vital signs such as temperature, blood pressure, glucose, and any other labs ordered   - Initiate measures to prevent increased intracranial pressure  - Monitor for seizure activity and implement precautions if appropriate      Outcome: Progressing  Goal: Remains free of injury related to seizures activity  Description: INTERVENTIONS  - Maintain airway, patient safety  and administer oxygen as ordered  - Monitor patient for seizure activity, document and report duration and description of seizure to physician/advanced practitioner  - If seizure occurs,  ensure patient safety during seizure  - Reorient patient post seizure  - Seizure pads on all 4 side rails  - Instruct patient/family to notify RN of any seizure activity including if an aura is experienced  - Instruct patient/family to call for assistance with activity based on nursing assessment  - Administer anti-seizure medications if ordered    Outcome: Progressing  Goal: Achieves maximal functionality and self care  Description: INTERVENTIONS  - Monitor swallowing and airway patency with patient fatigue and changes in neurological status  - Encourage and assist patient to increase activity and self care     - Encourage visually impaired, hearing impaired and aphasic patients to use assistive/communication devices  Outcome: Progressing     Problem: CARDIOVASCULAR - ADULT  Goal: Maintains optimal cardiac output and hemodynamic stability  Description: INTERVENTIONS:  - Monitor I/O, vital signs and rhythm  - Monitor for S/S and trends of decreased cardiac output  - Administer and titrate ordered vasoactive medications to optimize hemodynamic stability  - Assess quality of pulses, skin color and temperature  - Assess for signs of decreased coronary artery perfusion  - Instruct patient to report change in severity of symptoms  Outcome: Progressing  Goal: Absence of cardiac dysrhythmias or at baseline rhythm  Description: INTERVENTIONS:  - Continuous cardiac monitoring, vital signs, obtain 12 lead EKG if ordered  - Administer antiarrhythmic and heart rate control medications as ordered  - Monitor electrolytes and administer replacement therapy as ordered  Outcome: Progressing     Problem: RESPIRATORY - ADULT  Goal: Achieves optimal ventilation and oxygenation  Description: INTERVENTIONS:  - Assess for changes in respiratory status  - Assess for changes in mentation and behavior  - Position to facilitate oxygenation and minimize respiratory effort  - Oxygen administered by appropriate delivery if ordered  - Initiate smoking cessation education as indicated  - Encourage broncho-pulmonary hygiene including cough, deep breathe, Incentive Spirometry  - Assess the need for suctioning and aspirate as needed  - Assess and instruct to report SOB or any respiratory difficulty  - Respiratory Therapy support as indicated  Outcome: Progressing     Problem: GENITOURINARY - ADULT  Goal: Maintains or returns to baseline urinary function  Description: INTERVENTIONS:  - Assess urinary function  - Encourage oral fluids to ensure adequate hydration if ordered  - Administer IV fluids as ordered to ensure adequate hydration  - Administer ordered medications as needed  - Offer frequent toileting  - Follow urinary retention protocol if ordered  Outcome: Progressing  Goal: Absence of urinary retention  Description: INTERVENTIONS:  - Assess patients ability to void and empty bladder  - Monitor I/O  - Bladder scan as needed  - Discuss with physician/AP medications to alleviate retention as needed  - Discuss catheterization for long term situations as appropriate  Outcome: Progressing     Problem: METABOLIC, FLUID AND ELECTROLYTES - ADULT  Goal: Electrolytes maintained within normal limits  Description: INTERVENTIONS:  - Monitor labs and assess patient for signs and symptoms of electrolyte imbalances  - Administer electrolyte replacement as ordered  - Monitor response to electrolyte replacements, including repeat lab results as appropriate  - Instruct patient on fluid and nutrition as appropriate  Outcome: Progressing  Goal: Fluid balance maintained  Description: INTERVENTIONS:  - Monitor labs   - Monitor I/O and WT  - Instruct patient on fluid and nutrition as appropriate  - Assess for signs & symptoms of volume excess or deficit  Outcome: Progressing  Goal: Glucose maintained within target range  Description: INTERVENTIONS:  - Monitor Blood Glucose as ordered  - Assess for signs and symptoms of hyperglycemia and hypoglycemia  - Administer ordered medications to maintain glucose within target range  - Assess nutritional intake and initiate nutrition service referral as needed  Outcome: Progressing     Problem: SKIN/TISSUE INTEGRITY - ADULT  Goal: Skin integrity remains intact  Description: INTERVENTIONS  - Identify patients at risk for skin breakdown  - Assess and monitor skin integrity  - Assess and monitor nutrition and hydration status  - Monitor labs (i e  albumin)  - Assess for incontinence   - Turn and reposition patient  - Assist with mobility/ambulation  - Relieve pressure over bony prominences  - Avoid friction and shearing  - Provide appropriate hygiene as needed including keeping skin clean and dry  - Evaluate need for skin moisturizer/barrier cream  - Collaborate with interdisciplinary team (i e  Nutrition, Rehabilitation, etc )   - Patient/family teaching  Outcome: Progressing  Goal: Incision(s), wounds(s) or drain site(s) healing without S/S of infection  Description: INTERVENTIONS  - Assess and document risk factors for skin impairment   - Assess and document dressing, incision, wound bed, drain sites and surrounding tissue  - Consider nutrition services referral as needed  - Oral mucous membranes remain intact  - Provide patient/ family education  Outcome: Progressing  Goal: Oral mucous membranes remain intact  Description: INTERVENTIONS  - Assess oral mucosa and hygiene practices  - Implement preventative oral hygiene regimen  - Implement oral medicated treatments as ordered  - Initiate Nutrition services referral as needed  Outcome: Progressing     Problem: MUSCULOSKELETAL - ADULT  Goal: Maintain or return mobility to safest level of function  Description: INTERVENTIONS:  - Assess patient's ability to carry out ADLs; assess patient's baseline for ADL function and identify physical deficits which impact ability to perform ADLs (bathing, care of mouth/teeth, toileting, grooming, dressing, etc )  - Assess/evaluate cause of self-care deficits   - Assess range of motion  - Assess patient's mobility  - Assess patient's need for assistive devices and provide as appropriate  - Encourage maximum independence but intervene and supervise when necessary  - Involve family in performance of ADLs  - Assess for home care needs following discharge   - Consider OT consult to assist with ADL evaluation and planning for discharge  - Provide patient education as appropriate  Outcome: Progressing  Goal: Maintain proper alignment of affected body part  Description: INTERVENTIONS:  - Support, maintain and protect limb and body alignment  - Provide patient/ family with appropriate education  Outcome: Progressing

## 2021-02-14 NOTE — PROGRESS NOTES
[facility]   Amanda Yip 66 y o  male MRN: [id number]  Unit/Bed#: E5 -88 Encounter: [id number]  Reason for Consult:  Chronic kidney disease    ASSESSMENT and PLAN:  25-year-old with a history of hypertension, hyperlipidemia, arthritis, diabetes mellitus, CAD and CKD admitted for poor healing of left 5th metatarsal resection site  Nephrology consulted for perioperative optimization for prevention of acute kidney injury  1  Chronic kidney disease, stage IV:  · Baseline creatinine 2 2-2 5 mg/dL  ? Creatinine 2 04 as of 2/13  Slightly below baseline since 2/8  ? Lab holiday today  ? Check labs in the a m  · Etiology of CKD:  Diabetic nephropathy, +/-hypertensive nephrosclerosis, +/-renovascular disease  ? Follows with [facility]  · Gross hematuria -Status post cystoscopy 2/10 and fulguration day #2  · Agram 2/8 with repeat a gram and intervention 2/11  · Renal function stable  2  Gross hematuria:  Resolved  Urology following   · History of bladder cancer  · Postop day cystoscopy and fulguration, bleeding areas noted around the prostate  No visual evidence of recurrence of bladder tumor  Evacuation of small clot  3 way irrigation  3  Peripheral vascular disease, poor healing of left 5th metatarsal resection site, podiatry following  · Plan for debridement with graphix application TT/26  · On Ancef  4  Essential hypertension:  Blood pressure acceptable on Imdur and Toprol  5  Anemia in the setting of CKD:   Last Hemoglobin 9 7, stable  6  Diabetes mellitus:  Management per primary team    DISPOSITION:  No changes at this time  Labs in the a m  SUBJECTIVE / 24H INTERVAL HISTORY:  No acute complaints  Discussing plan regarding foot surgery      OBJECTIVE:  Current Weight: Weight - Scale: 107 kg (236 lb 5 3 oz)  Vitals:    02/13/21 2300 02/14/21 0600 02/14/21 0734 02/14/21 1100   BP: 140/85  129/77 124/82   BP Location: Right arm  Left arm Right arm   Pulse: 64  68 59   Resp: 18  18    Temp: (!) 97 3 °F (36 3 °C)  97 8 °F (36 6 °C) (!) 97 2 °F (36 2 °C)   TempSrc: Temporal  Temporal Temporal   SpO2: 98%  99% 98%   Weight:  107 kg (236 lb 5 3 oz)     Height:           Intake/Output Summary (Last 24 hours) at 2/14/2021 1144  Last data filed at 2/13/2021 1250  Gross per 24 hour   Intake 120 ml   Output --   Net 120 ml     General: NAD, comfortably sitting out of bed in the chair watching TV  Skin: no rash  Eyes: anicteric sclera  ENT: moist mucous membrane  Neck: supple  Chest: CTA b/l, no ronchii, no wheeze, no rubs, no rales  CVS:   Normal heart rate, regular rhythm  Abdomen: soft, nontender, nl sounds  Extremities: no edema LE b/l    Left foot wrapped  : no wagner, urine yellow, no hematuria noted  Neuro: AAOX3  Psych: normal affect  Medications:    Current Facility-Administered Medications:     acetaminophen (TYLENOL) tablet 650 mg, 650 mg, Oral, Q6H PRN, Bess Law MD, 650 mg at 02/14/21 1593    ALPRAZolam Karilyn Meline) tablet 0 25 mg, 0 25 mg, Oral, BID PRN, Bess Law MD    aspirin chewable tablet 81 mg, 81 mg, Oral, Daily, Bess Law MD, 81 mg at 02/14/21 8549    bismuth subsalicylate (PEPTO BISMOL) oral suspension 524 mg, 524 mg, Oral, Q6H PRN, Bess Law MD    ceFAZolin (ANCEF) IVPB (premix in dextrose) 2,000 mg 50 mL, 2,000 mg, Intravenous, Q8H, Bess Law MD, Last Rate: 100 mL/hr at 02/14/21 0530, 2,000 mg at 02/14/21 0530    docusate sodium (COLACE) capsule 100 mg, 100 mg, Oral, BID, Bess Law MD, 100 mg at 02/14/21 0823    fluticasone (FLONASE) 50 mcg/act nasal spray 1 spray, 1 spray, Each Nare, BID, Bess Law MD, 1 spray at 02/14/21 0831    gabapentin (NEURONTIN) capsule 600 mg, 600 mg, Oral, Daily, Bess Law MD, 600 mg at 02/14/21 2646    heparin (porcine) subcutaneous injection 5,000 Units, 5,000 Units, Subcutaneous, Q8H [facility], 5,000 Units at 02/14/21 0530 **AND** [COMPLETED] Platelet count, , , Once, Antonino Antonio MD    insulin glargine (LANTUS) subcutaneous injection 10 Units 0 1 mL, 10 Units, Subcutaneous, HS, Madeline Noland MD, 10 Units at 02/13/21 2147    insulin lispro (HumaLOG) 100 units/mL subcutaneous injection 1-6 Units, 1-6 Units, Subcutaneous, TID AC, 1 Units at 02/14/21 0828 **AND** Fingerstick Glucose (POCT), , , TID AC, Madeline Noland MD    isosorbide mononitrate (IMDUR) 24 hr tablet 30 mg, 30 mg, Oral, Daily, Madeline Noland MD, 30 mg at 02/14/21 0823    latanoprost (XALATAN) 0 005 % ophthalmic solution 1 drop, 1 drop, Both Eyes, HS, Madeline Noland MD, 1 drop at 02/13/21 2147    metoprolol succinate (TOPROL-XL) 24 hr tablet 100 mg, 100 mg, Oral, Daily, Madeline Noland MD, 100 mg at 02/14/21 9193    oxyCODONE (ROXICODONE) IR tablet 5 mg, 5 mg, Oral, Q4H PRN, Madeline Noland MD, 5 mg at 02/14/21 7252    pantoprazole (PROTONIX) EC tablet 40 mg, 40 mg, Oral, Early Morning, Madeline Noland MD, 40 mg at 02/14/21 0530    polyethylene glycol (MIRALAX) packet 17 g, 17 g, Oral, Daily, Frank Sanchez DO, 17 g at 02/13/21 8064    sertraline (ZOLOFT) tablet 50 mg, 50 mg, Oral, Daily, Madeline Noland MD, 50 mg at 02/14/21 0823    tamsulosin (FLOMAX) capsule 0 4 mg, 0 4 mg, Oral, HS, Madeline Noland MD, 0 4 mg at 02/13/21 2147    zolpidem (AMBIEN) tablet 5 mg, 5 mg, Oral, HS PRN, Madeline Noland MD, 5 mg at 02/12/21 2132    Laboratory Results:  Results from last 7 days   Lab Units 02/13/21  0544 02/12/21  0449 02/11/21  0508 02/10/21  0531 02/09/21  0806 02/09/21  0458 02/08/21  1616 02/08/21  0611 02/07/21  1458   WBC Thousand/uL  --   --  9 84 7 24 7 78  --   --  6 74  --    HEMOGLOBIN g/dL  --   --  9 7* 9 1* 9 5*  --  9 2* 10 4*  --    HEMATOCRIT %  --   --  29 2* 27 3* 28 4*  --  28 0* 31 4*  --    PLATELETS Thousands/uL  --   --  180 157 154  --   --  164 203   POTASSIUM mmol/L 4 3 4 5 4 6 4 3  --  4 5 5 4* 4 5  --    CHLORIDE mmol/L 103 104 104 107  --  106 106 106  --    CO2 mmol/L 26 26 23 23  --  23 26 24  --    BUN mg/dL 30* 29* 30* 32*  --  33* 35* 38* --    CREATININE mg/dL 2 04* 2 03* 2 14* 2 05*  --  1 96* 2 02* 2 03*  --    CALCIUM mg/dL 8 7 8 4 8 4 8 4  --  7 9* 7 9* 8 3  --    MAGNESIUM mg/dL  --  1 9 1 9  --   --   --   --   --   --    PHOSPHORUS mg/dL  --  3 7 3 6  --   --   --   --   --   --

## 2021-02-14 NOTE — CONSULTS
Consult- [facility] 1945, 66 y o  male MRN: [id number]    Unit/Bed#: E5 -01 Encounter: [id number]    Primary Care Provider: Devon Troy MD   Date and time admitted to hospital: 2/7/2021  1:39 PM      Inpatient consult to Internal Medicine  Consult performed by: Poncho Powell MD  Consult ordered by: Shayy Puckett DPM          * PVD (peripheral vascular disease) [facility])  Assessment & Plan  Patient presents with left lower extremity poor wound healing  Vascular initially following  s/p L PT atherectomy with angioplasty on 02/11  Podiatry primary  Planning for left lower extremity debridement with graphix application on 53/10  IV cefazolin    Type 2 diabetes mellitus, with long-term current use of insulin [facility]  Assessment & Plan  Lab Results   Component Value Date    HGBA1C 7 0 (H) 02/04/2021     Currently on lantus 10 units  Sliding scale, accuchecks  BG is within range, continue current mgmt      Recent Labs     02/13/21  1100 02/13/21  1558 02/14/21  0737 02/14/21  1109   POCGLU 142* 128 188* 152*       Blood Sugar Average: Last 72 hrs:  (P) 425 [id number]    Gross hematuria  Assessment & Plan  Resolved  Hx of Carcinoma in situ of bladder  S/P cystoscopy on 02/10, bleeding site cauterized  Wagner was irrigated and no further bleeding, wagner removed    CKD (chronic kidney disease) stage 4, GFR 15-29 ml/min [facility])  Assessment & Plan  Lab Results   Component Value Date    EGFR 35 02/13/2021    EGFR 35 02/12/2021    EGFR 33 02/11/2021    CREATININE 2 04 (H) 02/13/2021    CREATININE 2 03 (H) 02/12/2021    CREATININE 2 14 (H) 02/11/2021     Creatinine at baseline  Nephrology following    Hyperlipidemia  Assessment & Plan  Allergy to statin    CAD (coronary artery disease)  Assessment & Plan  Hx of CAD  Continue toprol, imdur and aspirin      VTE Prophylaxis: Sequential compression device (Venodyne)   / sequential compression device     Recommendations for Discharge:  · Recommend continuing current management  · Okay to proceed with debridement as planned    Counseling / Coordination of Care Time: 45 minutes  Greater than 50% of total time spent on patient counseling and coordination of care  Collaboration of Care: Were Recommendations Directly Discussed with Primary Treatment Team? - Yes     History of Present Illness:    Alvarez Diallo is a 66 y o  male who is originally admitted to the Podiatry service due to left foot wound  We are consulted for medical management  Patient has past medical history of type 2 diabetes, peripheral vascular disease, CAD, hypertension and hyperlipidemia  He initially presented for elective surgery with vascular has had poor left 5th metatarsal healing and was planning for angiogram with nephrology following  Patient initially had left lower extremity angiogram with balloon angioplasty with unable to achieve full perfusion  IR was subsequently consulted and underwent left leg arteriogram which was unsuccessful  Patient did suffer complications with hematuria with known history of carcinoma of the bladder  Urology was consulted and performed cystoscopy with cauterization of the lesion and site of bleed  Shirley was placed and next day was irrigated with no further bleeding  Shirley was removed  Left leg arteriogram was attempted by IR again with atheroectomy and 3 mm angioplasty with success  Podiatry was consulted and planning for left lower extremity debridement and we are consulted for further medical management as vascular patient was transferred to podiatry as primary  Patient seen today with no current complaints  Review of Systems:    Review of Systems   Constitutional: Negative for activity change, appetite change, chills and fever  HENT: Negative for congestion, sinus pressure and sore throat  Eyes: Negative for pain and discharge  Respiratory: Negative for cough, shortness of breath and wheezing      Cardiovascular: Negative for chest pain, palpitations and leg swelling  Gastrointestinal: Negative for abdominal distention, abdominal pain, blood in stool, diarrhea, nausea and vomiting  Endocrine: Negative for cold intolerance, heat intolerance, polydipsia, polyphagia and polyuria  Genitourinary: Negative for dysuria, frequency, hematuria and urgency  Musculoskeletal: Negative for arthralgias and back pain  Skin: Negative for color change and pallor  Neurological: Negative for seizures, syncope and weakness  Psychiatric/Behavioral: Negative for agitation and confusion  Past Medical and Surgical History:     Past Medical History:   Diagnosis Date    Anemia     Last assessed: 9/28/17    Anxiety     Arteriosclerotic cardiovascular disease     Last assessed: 9/28/17    Arthritis     Bladder [facility] )     bladder- had BCG treatments    Chronic kidney disease     CKD (chronic kidney disease) stage 4, GFR 15-29 ml/min [facility]     Colon polyp     Coronary artery disease     7 stents    Depression     Diabetes mellitus ([facility])     IDDM    GERD (gastroesophageal reflux disease)     Glaucoma     History of fusion of cervical spine     Hyperlipidemia     Hypertension     Insomnia     Last assessed: 11/14/12    Loss of hearing     has hearing aids but usually does not wear them    Other seasonal allergic rhinitis     Last assessed: 2/10/16    PAD (peripheral artery disease) ([facility])     Shortness of breath     on exertion    Spinal stenosis of lumbar region     Transient cerebral ischemia     Uses walker     w/c for longer distances       Past Surgical History:   Procedure Laterality Date    CARDIAC SURGERY      Cath stent placement  Last assessed: 3/9/17  Interventional Catheterization    CHOLECYSTECTOMY      COLONOSCOPY      CYSTOSCOPY      Diagnostic w/biopsy  Ammy Fines  Last assessed: 12/1/14    CYSTOURETHROSCOPY      w/cautery  Ammy Fines    GASTRIC BYPASS      For morbid obesity w/Shaji-en-Y   Resolved: 11/17/09    INCISION AND DRAINAGE OF WOUND Right 2/26/2017    Procedure: INCISION AND DRAINAGE (I&D) EXTREMITY WITH APPLICATION OF GRAFT JACKET;  Surgeon: Alessia Pires DPM;  [facility];  Service:     INCISION AND DRAINAGE OF WOUND Right 4/25/2017    Procedure: INCISION AND DRAINAGE (I&D) EXTREMITY, APPLICATION OF GRAFT;  Surgeon: Alessia Pires DPM;  [facility];  Service:     IR BIOPSY OTHER  7/2/2020    IR LOWER EXTREMITY ANGIOGRAM  2/8/2021    IR LOWER EXTREMITY ANGIOGRAM  2/11/2021    IR TUNNELED CENTRAL LINE PLACEMENT  12/24/2020    JOINT REPLACEMENT      christofer knees replaced    DC AMPUTATION METATARSAL+TOE,SINGLE Left 12/21/2020    Procedure: RAY RESECTION FOOT;  Surgeon: Doyle Dozier DPM;  [facility];  Service: Podiatry    DC AMPUTATION METATARSAL+TOE,SINGLE Left 12/31/2020    Procedure: 5TH MET RESECTION;  Surgeon: Doyle Dozier DPM;  [facility];  Service: Podiatry    DC CYSTOURETHROSCOPY W/IRRIG & EVAC CLOTS N/A 2/10/2021    Procedure: CYSTOSCOPY EVACUATION OF CLOTS, fulguration;  Surgeon: Lexie Morrison MD;  [facility];  Service: Urology    DC CYSTOURETHROSCOPY,BIOPSY N/A 8/16/2016    Procedure: CYSTOSCOPY WITH BIOPSIES;  Surgeon: Heydi Alarcon MD;  [facility];  Service: Urology    DC CYSTOURETHROSCOPY,FULGUR <0 5 CM LESN N/A 11/19/2020    Procedure: CYSTO W/BIOPSIES, transurethral prostate bx;  Surgeon: Ferrel Boxer, MD;  [facility];  Service: Urology    DC Morales Araiza 3RD+ ORD [address] [facility] Left 2/8/2021    Procedure: LEG angiogram, CO2 w/limited contrast with balloon angioplasty postertior tibial artery;  Surgeon: Cecilio Romero MD;  [facility];  Service: Vascular    ROTATOR CUFF REPAIR      SMALL INTESTINE SURGERY      Surgery Shaji-en-Y    SPINAL FUSION      lumbar and cervical fusions    VAC DRESSING APPLICATION Right 1/82/7913    Procedure: APPLICATION VAC DRESSING;  Surgeon: Kailash Perez Chris Jenkins DPM;  [facility];  Service:        Meds/Allergies:    all medications and allergies reviewed    Allergies: Allergies   Allergen Reactions    Atorvastatin Hives, Itching and Rash    Simvastatin Rash and Edema     "ALL STATINS"    Statins Itching    Insulin Lispro Swelling and Edema    Medical Tape      rash to electrodes    Other Itching, Rash and Other (See Comments)     rash to electrodes and adhesives       Social History:     Marital Status: /Civil Union    Substance Use History:   Social History     Substance and Sexual Activity   Alcohol Use Yes    Frequency: 2-3 times a week    Drinks per session: 1 or 2    Binge frequency: Never    Comment: beer / liquor     Social History     Tobacco Use   Smoking Status Former Smoker    Quit date:     Years since quittin 1   Smokeless Tobacco Never Used     Social History     Substance and Sexual Activity   Drug Use Not Currently    Types: Marijuana    Comment: quit 2019 had medical marijuana       Family History:    Family History   Problem Relation Age of Onset    Diabetes Mother     Heart disease Mother     Other Mother         High blood pressure    Heart disease Father     Diabetes Sister     Other Sister         High blood pressure    Kidney disease Sister     Heart disease Brother     Other Brother         High blood pressure       Physical Exam:     Vitals:   Blood Pressure: 124/82 (21 1100)  Pulse: 59 (21 1100)  Temperature: (!) 97 2 °F (36 2 °C) (21 1100)  Temp Source: Temporal (21 1100)  Respirations: 18 (21 0734)  Height: 6' 4" (193 cm) (21 1350)  Weight - Scale: 107 kg (236 lb 5 3 oz) (21 0600)  SpO2: 98 % (21 1100)    Physical Exam  Vitals signs and nursing note reviewed  Constitutional:       Appearance: Normal appearance  He is normal weight  HENT:      Head: Normocephalic and atraumatic     Eyes:      Conjunctiva/sclera: Conjunctivae normal  Pupils: Pupils are equal, round, and reactive to light  Cardiovascular:      Rate and Rhythm: Normal rate and regular rhythm  Heart sounds: Normal heart sounds  Pulmonary:      Breath sounds: Normal breath sounds  No wheezing or rhonchi  Abdominal:      General: Bowel sounds are normal  There is no distension  Palpations: Abdomen is soft  Tenderness: There is no abdominal tenderness  Musculoskeletal: Normal range of motion  General: No swelling  Comments: Left foot dressing   Skin:     General: Skin is warm and dry  Neurological:      General: No focal deficit present  Mental Status: He is alert and oriented to person, place, and time  Mental status is at baseline  Additional Data:     Lab Results: I have personally reviewed pertinent reports        Results from last 7 days   Lab Units 02/11/21  0508   WBC Thousand/uL 9 84   HEMOGLOBIN g/dL 9 7*   HEMATOCRIT % 29 2*   PLATELETS Thousands/uL 180   NEUTROS PCT % 69   LYMPHS PCT % 21   MONOS PCT % 7   EOS PCT % 3     Results from last 7 days   Lab Units 02/13/21  0544   SODIUM mmol/L 138   POTASSIUM mmol/L 4 3   CHLORIDE mmol/L 103   CO2 mmol/L 26   BUN mg/dL 30*   CREATININE mg/dL 2 04*   ANION GAP mmol/L 9   CALCIUM mg/dL 8 7   GLUCOSE RANDOM mg/dL 99     Results from last 7 days   Lab Units 02/08/21  0711   INR  1 06         Lab Results   Component Value Date/Time    HGBA1C 7 0 (H) 02/04/2021 01:04 PM    HGBA1C 7 7 (H) 01/11/2021 03:40 AM    HGBA1C 7 4 (H) 12/18/2020 04:52 PM    HGBA1C 7 5 (H) 10/16/2020 12:53 PM    HGBA1C 8 3 (H) 08/31/2019 02:45 AM    HGBA1C 8 3 (H) 08/30/2019 04:27 PM     Results from last 7 days   Lab Units 02/14/21  1109 02/14/21  0737 02/13/21  1558 02/13/21  1100 02/13/21  0704 02/12/21  2112 02/12/21  1545 02/12/21  1135 02/12/21  0705 02/11/21  1613 02/11/21  1121 02/11/21  0723   POC GLUCOSE mg/dl 152* 188* 128 142* 107 116 121 125 99 88 89 113           Imaging: I have personally reviewed pertinent reports  VAS lower limb arterial duplex, limited, unilateral   Final Result by Anika Aragon MD (02/12 1514)      IR lower extremity angiogram   Final Result by Michelle Mock MD (02/12 1650)   Impression:       1  Successful treatment of focal calcified resistant mid posterior tibial stenosis using atherectomy and 3 mm angioplasty with good result and no significant residual stenosis  2   Continuous posterior tibial and peroneal runoff at completion   3  Short proximal anterior tibial occlusion which could not be recannulated using antegrade access  Remainder of the anterior tibial artery appears patent into the foot without definite significant stenosis elsewhere  4   High left common femoral artery bifurcation         Workstation performed: [id number]         XR foot 3+ vw left   Final Result by Paris Acosta MD (02/09 1211)      Compared to 12/31/2020, no interval change of the 5th partial ray amputation stump  No developing erosion to suggest osteomyelitis  Workstation performed: [id number]         IR lower extremity angiogram   Final Result by Rebeca Limon (02/11 1250)          EKG, Pathology, and Other Studies Reviewed on Admission:   · EKG: None    ** Please Note: This note has been constructed using a voice recognition system   **

## 2021-02-14 NOTE — ASSESSMENT & PLAN NOTE
Lab Results   Component Value Date    HGBA1C 7 0 (H) 02/04/2021     Currently on lantus 10 units  Sliding scale, accuchecks  BG is within range, continue current mgmt      Recent Labs     02/13/21  1100 02/13/21  1558 02/14/21  0737 02/14/21  1109   POCGLU 142* 128 188* 152*       Blood Sugar Average: Last 72 hrs:  (P) 980 [id number]

## 2021-02-14 NOTE — ASSESSMENT & PLAN NOTE
Resolved  Hx of Carcinoma in situ of bladder  S/P cystoscopy on 02/10, bleeding site cauterized  Wagner was irrigated and no further bleeding, wagner removed

## 2021-02-15 ENCOUNTER — ANESTHESIA EVENT (INPATIENT)
Dept: PERIOP | Facility: HOSPITAL | Age: 76
DRG: 271 | End: 2021-02-15
Payer: MEDICARE

## 2021-02-15 LAB
ANION GAP SERPL CALCULATED.3IONS-SCNC: 9 MMOL/L (ref 4–13)
BUN SERPL-MCNC: 31 MG/DL (ref 5–25)
CALCIUM SERPL-MCNC: 8.7 MG/DL (ref 8.3–10.1)
CHLORIDE SERPL-SCNC: 104 MMOL/L (ref 100–108)
CO2 SERPL-SCNC: 25 MMOL/L (ref 21–32)
CREAT SERPL-MCNC: 2.02 MG/DL (ref 0.6–1.3)
ERYTHROCYTE [DISTWIDTH] IN BLOOD BY AUTOMATED COUNT: 13.3 % (ref 11.6–15.1)
GFR SERPL CREATININE-BSD FRML MDRD: 36 ML/MIN/1.73SQ M
GLUCOSE SERPL-MCNC: 104 MG/DL (ref 65–140)
GLUCOSE SERPL-MCNC: 131 MG/DL (ref 65–140)
GLUCOSE SERPL-MCNC: 149 MG/DL (ref 65–140)
GLUCOSE SERPL-MCNC: 161 MG/DL (ref 65–140)
GLUCOSE SERPL-MCNC: 88 MG/DL (ref 65–140)
HCT VFR BLD AUTO: 28.5 % (ref 36.5–49.3)
HGB BLD-MCNC: 9.4 G/DL (ref 12–17)
MCH RBC QN AUTO: 31 PG (ref 26.8–34.3)
MCHC RBC AUTO-ENTMCNC: 33 G/DL (ref 31.4–37.4)
MCV RBC AUTO: 94 FL (ref 82–98)
PLATELET # BLD AUTO: 188 THOUSANDS/UL (ref 149–390)
PMV BLD AUTO: 10.6 FL (ref 8.9–12.7)
POTASSIUM SERPL-SCNC: 4.2 MMOL/L (ref 3.5–5.3)
RBC # BLD AUTO: 3.03 MILLION/UL (ref 3.88–5.62)
SODIUM SERPL-SCNC: 138 MMOL/L (ref 136–145)
WBC # BLD AUTO: 7.37 THOUSAND/UL (ref 4.31–10.16)

## 2021-02-15 PROCEDURE — 99232 SBSQ HOSP IP/OBS MODERATE 35: CPT | Performed by: INTERNAL MEDICINE

## 2021-02-15 PROCEDURE — 80048 BASIC METABOLIC PNL TOTAL CA: CPT | Performed by: PODIATRIST

## 2021-02-15 PROCEDURE — 82948 REAGENT STRIP/BLOOD GLUCOSE: CPT

## 2021-02-15 PROCEDURE — 85027 COMPLETE CBC AUTOMATED: CPT | Performed by: PODIATRIST

## 2021-02-15 RX ORDER — SODIUM CHLORIDE 9 MG/ML
50 INJECTION, SOLUTION INTRAVENOUS CONTINUOUS
Status: DISCONTINUED | OUTPATIENT
Start: 2021-02-16 | End: 2021-02-17

## 2021-02-15 RX ORDER — ASPIRIN 81 MG/1
81 TABLET ORAL DAILY
Status: DISCONTINUED | OUTPATIENT
Start: 2021-02-16 | End: 2021-02-17 | Stop reason: HOSPADM

## 2021-02-15 RX ADMIN — OXYCODONE HYDROCHLORIDE 5 MG: 5 TABLET ORAL at 09:14

## 2021-02-15 RX ADMIN — CEFAZOLIN SODIUM 2000 MG: 2 SOLUTION INTRAVENOUS at 05:29

## 2021-02-15 RX ADMIN — INSULIN LISPRO 1 UNITS: 100 INJECTION, SOLUTION INTRAVENOUS; SUBCUTANEOUS at 12:17

## 2021-02-15 RX ADMIN — DOCUSATE SODIUM 100 MG: 100 CAPSULE, LIQUID FILLED ORAL at 09:15

## 2021-02-15 RX ADMIN — INSULIN GLARGINE 10 UNITS: 100 INJECTION, SOLUTION SUBCUTANEOUS at 21:18

## 2021-02-15 RX ADMIN — LATANOPROST 1 DROP: 50 SOLUTION OPHTHALMIC at 21:20

## 2021-02-15 RX ADMIN — CEFAZOLIN SODIUM 2000 MG: 2 SOLUTION INTRAVENOUS at 21:18

## 2021-02-15 RX ADMIN — ZOLPIDEM TARTRATE 5 MG: 5 TABLET, COATED ORAL at 21:19

## 2021-02-15 RX ADMIN — ASPIRIN 81 MG: 81 TABLET, CHEWABLE ORAL at 09:14

## 2021-02-15 RX ADMIN — METOPROLOL SUCCINATE 100 MG: 50 TABLET, EXTENDED RELEASE ORAL at 09:15

## 2021-02-15 RX ADMIN — TAMSULOSIN HYDROCHLORIDE 0.4 MG: 0.4 CAPSULE ORAL at 21:19

## 2021-02-15 RX ADMIN — HEPARIN SODIUM 5000 UNITS: 5000 INJECTION INTRAVENOUS; SUBCUTANEOUS at 13:29

## 2021-02-15 RX ADMIN — GABAPENTIN 600 MG: 300 CAPSULE ORAL at 09:15

## 2021-02-15 RX ADMIN — SERTRALINE HYDROCHLORIDE 50 MG: 50 TABLET ORAL at 09:15

## 2021-02-15 RX ADMIN — CEFAZOLIN SODIUM 2000 MG: 2 SOLUTION INTRAVENOUS at 13:28

## 2021-02-15 RX ADMIN — PANTOPRAZOLE SODIUM 40 MG: 40 TABLET, DELAYED RELEASE ORAL at 05:29

## 2021-02-15 RX ADMIN — ISOSORBIDE MONONITRATE 30 MG: 30 TABLET, EXTENDED RELEASE ORAL at 09:15

## 2021-02-15 NOTE — ANESTHESIA PREPROCEDURE EVALUATION
Procedure:  FOOT DEBRIDE, [address] OUT w/graft application (Left Foot)    Relevant Problems   CARDIO   (+) CAD (coronary artery disease)   (+) Hyperlipidemia   (+) Hypertension with renal disease   (+) Stable angina pectoris (HCC)      ENDO   (+) Secondary renal hyperparathyroidism (HCC)   (+) Type 2 diabetes mellitus, with long-term current use of insulin (HCC)      /RENAL   (+) CKD (chronic kidney disease) stage 3, GFR 30-59 ml/min (HCC)   (+) CKD (chronic kidney disease) stage 4, GFR 15-29 ml/min (HCC)      HEMATOLOGY   (+) Anemia of chronic disease   (+) Chronic anemia      MUSCULOSKELETAL   (+) Back pain   (+) Degeneration of lumbar intervertebral disc   (+) Primary osteoarthritis of left knee      NEURO/PSYCH   (+) Diabetic polyneuropathy associated with type 2 diabetes mellitus (HCC)   (+) Stable angina pectoris ([address] )      LEFT VENTRICLE:  Systolic function was normal  Ejection fraction was estimated to be 60 %  There were no regional wall motion abnormalities  Wall thickness was mildly to moderately increased  Physical Exam    Airway    Mallampati score: III    Neck ROM: full     Dental       Cardiovascular  Rhythm: regular, Rate: normal,     Pulmonary  Breath sounds clear to auscultation,     Other Findings        Anesthesia Plan  ASA Score- 3     Anesthesia Type- general with ASA Monitors  Additional Monitors:   Airway Plan: LMA  Plan Factors-Exercise tolerance (METS): <4 METS  Chart reviewed  EKG reviewed  Existing labs reviewed  Patient summary reviewed  Patient is not a current smoker  Patient not instructed to abstain from smoking on day of procedure  Patient did not smoke on day of surgery  Induction- intravenous  Postoperative Plan- Plan for postoperative opioid use  Informed Consent- Anesthetic plan and risks discussed with patient

## 2021-02-15 NOTE — PROGRESS NOTES
Progress Note - Podiatry  Jaskaran Valencia 66 y o  male MRN: [id number]  Unit/Bed#: E5 -01 Encounter: [id number]    Assessment:     Jaskaran Valencia is a 66 y o  male with:     1  Non-healing left foot surgical wound - POA  - s/p left partial 5th met resection due to positive margins 12/31/20  - s/p left partial 5th ray resection due to osteomyelitis 12/21/20  2  PAD  - s/p LLE agram 2/11/21  3  T2DM  4  Htn  5  Hyperlipidemia  6  CAD  7  CKD stage 3    Plan:  - Plan for left foot wound debridement and application of Grafix graft tomorrow 2/16/21  NPO midnight, gentle IV hydration per nephrology  Healing of wound in guarded due to significant PAD however is optimized from vascular standpoint   - Appreciate med mnmgt per SLIM and nephrology    WBS: NWB LLE  VTE ppx: heparin subq, SCD  Abx: IV ancef    Dispo: patient to require further inpt stay due to above  Expect discharge within 24-48hrs after surgery    Subjective/Objective   Chief Complaint: No chief complaint on file  Subjective: 66 y o  y/o male was seen and evaluated at bedside  Patient denies nausea, vomiting, chest pain, shortness of breath, chills, fever  Blood pressure 122/68, pulse 73, temperature 97 7 °F (36 5 °C), temperature source Temporal, resp  rate 17, height 6' 4" (1 93 m), weight 107 kg (236 lb 5 3 oz), SpO2 98 %  ,Body mass index is 28 77 kg/m²  Invasive Devices     Peripheral Intravenous Line            Peripheral IV 02/12/21 Right;Left Antecubital 3 days          Line            Arterial Sheath 6 Fr  Left Femoral 3 days                Physical Exam:   General: Alert, cooperative and no distress  Lungs: Non labored breathing  Heart: Positive S1, S2  Abdomen: Soft, non-tender  RLE gross msk and nvs baseline  LEFT lower Extremity: Dressing c/d/i             Lab, Imaging and other studies:   CBC:   Lab Results   Component Value Date    WBC 7 37 02/15/2021    HGB 9 4 (L) 02/15/2021    HCT 28 5 (L) 02/15/2021    MCV 94 02/15/2021  02/15/2021    MCH 31 0 02/15/2021    MCHC 33 0 02/15/2021    RDW 13 3 02/15/2021    MPV 10 6 02/15/2021   , CMP:   Lab Results   Component Value Date    SODIUM 138 02/15/2021    K 4 2 02/15/2021     02/15/2021    CO2 25 02/15/2021    BUN 31 (H) 02/15/2021    CREATININE 2 02 (H) 02/15/2021    CALCIUM 8 7 02/15/2021    EGFR 36 02/15/2021       Imaging: I have personally reviewed pertinent films in PACS  EKG, Pathology, and Other Studies: I have personally reviewed pertinent reports    VTE Pharmacologic Prophylaxis: Heparin

## 2021-02-15 NOTE — PROGRESS NOTES
[facility] Progress Note  Patient: Fabrizio Barker 66 y o  male   MRN: [id number]  PCP: Luci Barakat MD  Unit/Bed#: E5 -01 Encounter: [id number]  Date Of Visit: 02/15/21      Assessment/plan  1  Non healing left surgical foot wound- poa- per podiatry  Plan is for left foot wound debridement with application of grafix graft tomorrow  2  Recurrent Hematuria- due to urothelial carcinoma of bladder  Urology had been following pt and he is s/p cysto with fulguration of bleeding areas within prostate  He also had a cbi  His urine cleared and now he is having recurrent hematuria  Urology will follow up  Will hold heparin and asa    3  Type 2 diabetes- a1c is 7 0  he will continue with lantus of 10 units  He will continue insulin sliding scale    4  ckd IV- appreciate nephrology eval  Baseline creatinine is 2 2-2 5  check bmp in am    5  Cad- currently asymptomatic  Continue toprol, imdur, and asa  May need to hold asa due to recurrent hematuria  Subjective:   Pt seen and examined  Pt reports he is urinating blood again  He denies clots  No f/c no cp no sob no n/v/d no abd pain  Objective:     Vitals: Blood pressure 127/88, pulse 73, temperature 98 3 °F (36 8 °C), temperature source Temporal, resp  rate 20, height 6' 4" (1 93 m), weight 107 kg (236 lb 5 3 oz), SpO2 99 %  ,Body mass index is 28 77 kg/m²  Lab, Imaging and other studies:  Results from last 7 days   Lab Units 02/15/21  0522   WBC Thousand/uL 7 37   HEMOGLOBIN g/dL 9 4*   HEMATOCRIT % 28 5*   PLATELETS Thousands/uL 188     Results from last 7 days   Lab Units 02/15/21  0522   POTASSIUM mmol/L 4 2   CHLORIDE mmol/L 104   CO2 mmol/L 25   BUN mg/dL 31*   CREATININE mg/dL 2 02*   CALCIUM mg/dL 8 7         Lab Results   Component Value Date    BLOODCX No Growth After 5 Days  12/21/2020    BLOODCX No Growth After 5 Days  12/21/2020    BLOODCX No Growth After 5 Days   12/18/2020    URINECX No Growth <1000 cfu/mL 02/11/2021    URINECX <10,000 cfu/ml Staphylococcus coagulase negative (A) 02/09/2021    URINECX No Growth <1000 cfu/mL 11/16/2020    WOUNDCULT 1+ Growth of Staphylococcus aureus (A) 12/21/2020    WOUNDCULT Few Colonies of Enterobacter aerogenes (A) 12/21/2020    WOUNDCULT 2+ Growth of Enterobacter aerogenes (A) 12/18/2020    WOUNDCULT 4+ Growth of Staphylococcus aureus (A) 12/18/2020         Scheduled Meds:   Current Facility-Administered Medications   Medication Dose Route Frequency Provider Last Rate    acetaminophen  650 mg Oral Q6H PRN Madeline Noland MD      ALPRAZolam  0 25 mg Oral BID PRN Madeline Noland MD      aspirin  81 mg Oral Daily Madeline Noland MD      bismuth subsalicylate  590 mg Oral Q6H PRN Madeline Noland MD      cefazolin  2,000 mg Intravenous Q8H Madeline Noland MD 2,000 mg (02/15/21 1328)    docusate sodium  100 mg Oral BID Madeline Noland MD      fluticasone  1 spray Each Nare BID Madeline Noland MD      gabapentin  600 mg Oral Daily Madeline Noland MD      heparin (porcine)  5,000 Units Subcutaneous [facility] Madeline Noland MD      insulin glargine  10 Units Subcutaneous HS Madeline Noland MD      insulin lispro  1-6 Units Subcutaneous TID [facility] Madeline Noland MD      isosorbide mononitrate  30 mg Oral Daily Madeline Noland MD      latanoprost  1 drop Both Eyes HS Madeline Noland MD      metoprolol succinate  100 mg Oral Daily Madeline Noland MD      oxyCODONE  5 mg Oral Q4H PRN Madeline Noland MD      pantoprazole  40 mg Oral Early Morning Madeline Noland MD      polyethylene glycol  17 g Oral Daily Radha,       sertraline  50 mg Oral Daily Madeline Noland MD      [START ON 2/16/2021] sodium chloride  50 mL/hr Intravenous Continuous Radha Banks DPM      tamsulosin  0 4 mg Oral HS Madeline Noland MD      zolpidem  5 mg Oral HS PRN Madeline Noland MD       Continuous Infusions: [START ON 2/16/2021] sodium chloride, 50 mL/hr      PRN Meds:   acetaminophen    ALPRAZolam    bismuth subsalicylate    oxyCODONE    zolpidem      Physical exam:  Physical Exam  General appearance: alert and oriented, in no acute distress  Head: Normocephalic, without obvious abnormality, atraumatic  Eyes: conjunctivae/corneas clear  PERRL, EOM's intact  Fundi benign    Neck: no adenopathy, no carotid bruit, no JVD, supple, symmetrical, trachea midline and thyroid not enlarged, symmetric, no tenderness/mass/nodules  Lungs: clear to auscultation bilaterally  Heart: regular rate and rhythm, S1, S2 normal, no murmur, click, rub or gallop  Abdomen: soft, non-tender; bowel sounds normal; no masses,  no organomegaly  Neurologic: Mental status: Alert, oriented, thought content appropriate      VTE Pharmacologic Prophylaxis: Reason for no pharmacologic prophylaxis hematuria  VTE Mechanical Prophylaxis: sequential compression device    Counseling / Coordination of Care  Total floor / unit time spent today 20 minutes     Current Length of Stay: 8 day(s)    Current Patient Status: Inpatient     Code Status: Level 1 - Full Code

## 2021-02-15 NOTE — PROGRESS NOTES
NEPHROLOGY PROGRESS NOTE   Alcides Vasquez 66 y o  male MRN: [id number]  Unit/Bed#: E5 -01 Encounter: [id number]  Reason for Consult:  CKD stage IV    PLAN:   -renal function remains within baseline   -for OR tomorrow  Recommend starting gentle hydration after midnight when patient is NPO   -avoid hypotension  Antihypertensives holding parameters adjusted  ASSESSMENT/PLAN:  CKD stage IV:  Suspected secondary to hypertensive nephrosclerosis, diabetic nephropathy, and renal vascular disease   -baseline creatinine 2 2-2 5   -status post a gram on 02/08 with repeat a gram on 02/11  Will continue to monitor for ROSSY  -follows with [facility] specialist   -continue to avoid hypotension, IV contrast, nephrotoxins  -will continue to monitor and trend BMP   -I/O  Hypertension:  Blood pressure well controlled  -continues on Imdur and metoprolol   -avoid hypotension or high fluctuations in blood pressure   -recommend holding parameters antihypertensive for systolic blood pressure less than 130  History of bladder cancer with gross hematuria:  Patient reports repeat hematuria this morning  Status post cystoscopy with fulguration on 02/10/2021 without evidence of recurrence of bladder tumors  -recommend Urology input  PVD with nonhealing left foot wound:  Plan for OR tomorrow for debridement with graphix graft application    -continues on antibiotics  -recommend gentle IV hydration after midnight when patient is NPO  Other:  Hyperlipidemia, arthritis, diabetes, CAD  Disposition:  Requiring additional stay due to medical needs  SUBJECTIVE:  The patient is resting  He denies chest pain or shortness of breath  He denies nausea, vomiting, diarrhea, or issues with urination  He does admit to having some hematuria this morning      OBJECTIVE:  Current Weight: Weight - Scale: 107 kg (236 lb 5 3 oz)  Vitals:    02/15/21 0034 02/15/21 0557 02/15/21 0900 02/15/21 1100   BP: 139/86  122/68 127/88 BP Location: Left arm  Right arm Right arm   Pulse: 63  73 73   Resp: 18  17 20   Temp: 97 5 °F (36 4 °C)  97 7 °F (36 5 °C) 98 3 °F (36 8 °C)   TempSrc: Axillary  Temporal Temporal   SpO2: 93%  98% 99%   Weight:  107 kg (236 lb 5 3 oz)     Height:           Intake/Output Summary (Last 24 hours) at 2/15/2021 1256  Last data filed at 2/15/2021 0901  Gross per 24 hour   Intake --   Output 1825 ml   Net -1825 ml     General: NAD  Skin: warm, dry, intact, no rash  HEENT: Moist mucous membranes, sclera anicteric, normocephalic, atraumatic  Neck: No apparent JVD appreciated  Chest: lung sounds clear B/L, on RA   CVS:Regular rate and rhythm, no murmer   Abdomen: Soft, round, non-tender, +BS  Extremities:  Left lower extremity edema, foot wrapped with Ace  Neuro: alert and oriented  Psych: appropriate mood and affect     Medications:    Current Facility-Administered Medications:     acetaminophen (TYLENOL) tablet 650 mg, 650 mg, Oral, Q6H PRN, Lexie Morrison MD, 650 mg at 02/14/21 1730    ALPRAZolam (XANAX) tablet 0 25 mg, 0 25 mg, Oral, BID PRN, Lexie Morrison MD    aspirin chewable tablet 81 mg, 81 mg, Oral, Daily, Lexie Morrison MD, 81 mg at 02/15/21 0914    bismuth subsalicylate (PEPTO BISMOL) oral suspension 524 mg, 524 mg, Oral, Q6H PRN, Lexie Morrison MD    ceFAZolin (ANCEF) IVPB (premix in dextrose) 2,000 mg 50 mL, 2,000 mg, Intravenous, Q8H, Lexie Morrison MD, Last Rate: 100 mL/hr at 02/15/21 0529, 2,000 mg at 02/15/21 0529    docusate sodium (COLACE) capsule 100 mg, 100 mg, Oral, BID, Lexie Morrison MD, 100 mg at 02/15/21 0915    fluticasone (FLONASE) 50 mcg/act nasal spray 1 spray, 1 spray, Each Nare, BID, Lexie Morrison MD, 1 spray at 02/14/21 0831    gabapentin (NEURONTIN) capsule 600 mg, 600 mg, Oral, Daily, Lexie Morrison MD, 600 mg at 02/15/21 0915    heparin (porcine) subcutaneous injection 5,000 Units, 5,000 Units, Subcutaneous, Q8H [facility], Stopped at 02/15/21 0540 **AND** [COMPLETED] Platelet count, , , Once, Ketan Espinoza MD    insulin glargine (LANTUS) subcutaneous injection 10 Units 0 1 mL, 10 Units, Subcutaneous, HS, Sundeep Dewitt MD, 10 Units at 02/14/21 2122    insulin lispro (HumaLOG) 100 units/mL subcutaneous injection 1-6 Units, 1-6 Units, Subcutaneous, TID AC, 1 Units at 02/15/21 1217 **AND** Fingerstick Glucose (POCT), , , TID AC, Sundeep Dewitt MD    isosorbide mononitrate (IMDUR) 24 hr tablet 30 mg, 30 mg, Oral, Daily, Sundeep Dewitt MD, 30 mg at 02/15/21 0915    latanoprost (XALATAN) 0 005 % ophthalmic solution 1 drop, 1 drop, Both Eyes, HS, Sundeep Dewitt MD, 1 drop at 02/14/21 2123    metoprolol succinate (TOPROL-XL) 24 hr tablet 100 mg, 100 mg, Oral, Daily, Sundeep Dewitt MD, 100 mg at 02/15/21 0915    oxyCODONE (ROXICODONE) IR tablet 5 mg, 5 mg, Oral, Q4H PRN, Sundeep Dewitt MD, 5 mg at 02/15/21 0914    pantoprazole (PROTONIX) EC tablet 40 mg, 40 mg, Oral, Early Morning, Sundeep Dewitt MD, 40 mg at 02/15/21 0529    polyethylene glycol (MIRALAX) packet 17 g, 17 g, Oral, Daily, Karlos Lava, DO, 17 g at 02/13/21 3880    sertraline (ZOLOFT) tablet 50 mg, 50 mg, Oral, Daily, Sundeep Dewitt MD, 50 mg at 02/15/21 0915    [START ON 2/16/2021] sodium chloride 0 9 % infusion, 50 mL/hr, Intravenous, Continuous, Jose G Dudley DPM    [facility]) capsule 0 4 mg, 0 4 mg, Oral, HS, Sundeep Dewitt MD, 0 4 mg at 02/14/21 2122    zolpidem (AMBIEN) tablet 5 mg, 5 mg, Oral, HS PRN, Sundeep Dewitt MD, 5 mg at 02/14/21 2123    Laboratory Results:  Results from last 7 days   Lab Units 02/15/21  0522 02/13/21  0544 02/12/21  0449 02/11/21  0508 02/10/21  0531   WBC Thousand/uL 7 37  --   --  9 84 7 24   HEMOGLOBIN g/dL 9 4*  --   --  9 7* 9 1*   HEMATOCRIT % 28 5*  --   --  29 2* 27 3*   PLATELETS Thousands/uL 188  --   --  180 157   SODIUM mmol/L 138 138 137 136 139   POTASSIUM mmol/L 4 2 4 3 4 5 4 6 4 3   CHLORIDE mmol/L 104 103 104 104 107   CO2 mmol/L 25 26 26 23 23   BUN mg/dL 31* 30* 29* 30* 32*   CREATININE mg/dL 2 02* 2 04* 2 03* 2 14* 2 05*   CALCIUM mg/dL 8 7 8 7 8 4 8 4 8 4   MAGNESIUM mg/dL  --   --  1 9 1 9  --    PHOSPHORUS mg/dL  --   --  3 7 3 6  --

## 2021-02-15 NOTE — PLAN OF CARE
Problem: Potential for Falls  Goal: Patient will remain free of falls  Description: INTERVENTIONS:  - Assess patient frequently for physical needs  -  Identify cognitive and physical deficits and behaviors that affect risk of falls  -  [place] fall precautions as indicated by assessment   - Educate patient/family on patient safety including physical limitations  - Instruct patient to call for assistance with activity based on assessment  - Modify environment to reduce risk of injury  - Consider OT/PT consult to assist with strengthening/mobility  Outcome: Progressing     Problem: NEUROSENSORY - ADULT  Goal: Achieves stable or improved neurological status  Description: INTERVENTIONS  - Monitor and report changes in neurological status  - Monitor vital signs such as temperature, blood pressure, glucose, and any other labs ordered   - Initiate measures to prevent increased intracranial pressure  - Monitor for seizure activity and implement precautions if appropriate      Outcome: Progressing  Goal: Remains free of injury related to seizures activity  Description: INTERVENTIONS  - Maintain airway, patient safety  and administer oxygen as ordered  - Monitor patient for seizure activity, document and report duration and description of seizure to physician/advanced practitioner  - If seizure occurs,  ensure patient safety during seizure  - Reorient patient post seizure  - Seizure pads on all 4 side rails  - Instruct patient/family to notify RN of any seizure activity including if an aura is experienced  - Instruct patient/family to call for assistance with activity based on nursing assessment  - Administer anti-seizure medications if ordered    Outcome: Progressing  Goal: Achieves maximal functionality and self care  Description: INTERVENTIONS  - Monitor swallowing and airway patency with patient fatigue and changes in neurological status  - Encourage and assist patient to increase activity and self care     - Encourage visually impaired, hearing impaired and aphasic patients to use assistive/communication devices  Outcome: Progressing     Problem: CARDIOVASCULAR - ADULT  Goal: Maintains optimal cardiac output and hemodynamic stability  Description: INTERVENTIONS:  - Monitor I/O, vital signs and rhythm  - Monitor for S/S and trends of decreased cardiac output  - Administer and titrate ordered vasoactive medications to optimize hemodynamic stability  - Assess quality of pulses, skin color and temperature  - Assess for signs of decreased coronary artery perfusion  - Instruct patient to report change in severity of symptoms  Outcome: Progressing  Goal: Absence of cardiac dysrhythmias or at baseline rhythm  Description: INTERVENTIONS:  - Continuous cardiac monitoring, vital signs, obtain 12 lead EKG if ordered  - Administer antiarrhythmic and heart rate control medications as ordered  - Monitor electrolytes and administer replacement therapy as ordered  Outcome: Progressing     Problem: RESPIRATORY - ADULT  Goal: Achieves optimal ventilation and oxygenation  Description: INTERVENTIONS:  - Assess for changes in respiratory status  - Assess for changes in mentation and behavior  - Position to facilitate oxygenation and minimize respiratory effort  - Oxygen administered by appropriate delivery if ordered  - Initiate smoking cessation education as indicated  - Encourage broncho-pulmonary hygiene including cough, deep breathe, Incentive Spirometry  - Assess the need for suctioning and aspirate as needed  - Assess and instruct to report SOB or any respiratory difficulty  - Respiratory Therapy support as indicated  Outcome: Progressing     Problem: GENITOURINARY - ADULT  Goal: Maintains or returns to baseline urinary function  Description: INTERVENTIONS:  - Assess urinary function  - Encourage oral fluids to ensure adequate hydration if ordered  - Administer IV fluids as ordered to ensure adequate hydration  - Administer ordered medications as needed  - Offer frequent toileting  - Follow urinary retention protocol if ordered  Outcome: Progressing  Goal: Absence of urinary retention  Description: INTERVENTIONS:  - Assess patients ability to void and empty bladder  - Monitor I/O  - Bladder scan as needed  - Discuss with physician/AP medications to alleviate retention as needed  - Discuss catheterization for long term situations as appropriate  Outcome: Progressing     Problem: METABOLIC, FLUID AND ELECTROLYTES - ADULT  Goal: Electrolytes maintained within normal limits  Description: INTERVENTIONS:  - Monitor labs and assess patient for signs and symptoms of electrolyte imbalances  - Administer electrolyte replacement as ordered  - Monitor response to electrolyte replacements, including repeat lab results as appropriate  - Instruct patient on fluid and nutrition as appropriate  Outcome: Progressing  Goal: Fluid balance maintained  Description: INTERVENTIONS:  - Monitor labs   - Monitor I/O and WT  - Instruct patient on fluid and nutrition as appropriate  - Assess for signs & symptoms of volume excess or deficit  Outcome: Progressing  Goal: Glucose maintained within target range  Description: INTERVENTIONS:  - Monitor Blood Glucose as ordered  - Assess for signs and symptoms of hyperglycemia and hypoglycemia  - Administer ordered medications to maintain glucose within target range  - Assess nutritional intake and initiate nutrition service referral as needed  Outcome: Progressing     Problem: SKIN/TISSUE INTEGRITY - ADULT  Goal: Skin integrity remains intact  Description: INTERVENTIONS  - Identify patients at risk for skin breakdown  - Assess and monitor skin integrity  - Assess and monitor nutrition and hydration status  - Monitor labs (i e  albumin)  - Assess for incontinence   - Turn and reposition patient  - Assist with mobility/ambulation  - Relieve pressure over bony prominences  - Avoid friction and shearing  - Provide appropriate hygiene as needed including keeping skin clean and dry  - Evaluate need for skin moisturizer/barrier cream  - Collaborate with interdisciplinary team (i e  Nutrition, Rehabilitation, etc )   - Patient/family teaching  Outcome: Progressing  Goal: Incision(s), wounds(s) or drain site(s) healing without S/S of infection  Description: INTERVENTIONS  - Assess and document risk factors for skin impairment   - Assess and document dressing, incision, wound bed, drain sites and surrounding tissue  - Consider nutrition services referral as needed  - Oral mucous membranes remain intact  - Provide patient/ family education  Outcome: Progressing  Goal: Oral mucous membranes remain intact  Description: INTERVENTIONS  - Assess oral mucosa and hygiene practices  - Implement preventative oral hygiene regimen  - Implement oral medicated treatments as ordered  - Initiate Nutrition services referral as needed  Outcome: Progressing     Problem: MUSCULOSKELETAL - ADULT  Goal: Maintain or return mobility to safest level of function  Description: INTERVENTIONS:  - Assess patient's ability to carry out ADLs; assess patient's baseline for ADL function and identify physical deficits which impact ability to perform ADLs (bathing, care of mouth/teeth, toileting, grooming, dressing, etc )  - Assess/evaluate cause of self-care deficits   - Assess range of motion  - Assess patient's mobility  - Assess patient's need for assistive devices and provide as appropriate  - Encourage maximum independence but intervene and supervise when necessary  - Involve family in performance of ADLs  - Assess for home care needs following discharge   - Consider OT consult to assist with ADL evaluation and planning for discharge  - Provide patient education as appropriate  Outcome: Progressing  Goal: Maintain proper alignment of affected body part  Description: INTERVENTIONS:  - Support, maintain and protect limb and body alignment  - Provide patient/ family with appropriate education  Outcome: Progressing

## 2021-02-16 ENCOUNTER — ANESTHESIA (INPATIENT)
Dept: PERIOP | Facility: HOSPITAL | Age: 76
DRG: 271 | End: 2021-02-16
Payer: MEDICARE

## 2021-02-16 VITALS — HEART RATE: 74 BPM

## 2021-02-16 LAB
ANION GAP SERPL CALCULATED.3IONS-SCNC: 8 MMOL/L (ref 4–13)
BUN SERPL-MCNC: 34 MG/DL (ref 5–25)
CALCIUM SERPL-MCNC: 9.1 MG/DL (ref 8.3–10.1)
CHLORIDE SERPL-SCNC: 104 MMOL/L (ref 100–108)
CO2 SERPL-SCNC: 26 MMOL/L (ref 21–32)
CREAT SERPL-MCNC: 2.06 MG/DL (ref 0.6–1.3)
ERYTHROCYTE [DISTWIDTH] IN BLOOD BY AUTOMATED COUNT: 13.3 % (ref 11.6–15.1)
GFR SERPL CREATININE-BSD FRML MDRD: 35 ML/MIN/1.73SQ M
GLUCOSE SERPL-MCNC: 100 MG/DL (ref 65–140)
GLUCOSE SERPL-MCNC: 108 MG/DL (ref 65–140)
GLUCOSE SERPL-MCNC: 170 MG/DL (ref 65–140)
GLUCOSE SERPL-MCNC: 239 MG/DL (ref 65–140)
GLUCOSE SERPL-MCNC: 93 MG/DL (ref 65–140)
HCT VFR BLD AUTO: 28 % (ref 36.5–49.3)
HGB BLD-MCNC: 9.1 G/DL (ref 12–17)
MCH RBC QN AUTO: 30.6 PG (ref 26.8–34.3)
MCHC RBC AUTO-ENTMCNC: 32.5 G/DL (ref 31.4–37.4)
MCV RBC AUTO: 94 FL (ref 82–98)
MYCOBACTERIUM SPEC CULT: NORMAL
PLATELET # BLD AUTO: 184 THOUSANDS/UL (ref 149–390)
PMV BLD AUTO: 9.9 FL (ref 8.9–12.7)
POTASSIUM SERPL-SCNC: 4.2 MMOL/L (ref 3.5–5.3)
RBC # BLD AUTO: 2.97 MILLION/UL (ref 3.88–5.62)
RHODAMINE-AURAMINE STN SPEC: NORMAL
SODIUM SERPL-SCNC: 138 MMOL/L (ref 136–145)
WBC # BLD AUTO: 7.97 THOUSAND/UL (ref 4.31–10.16)

## 2021-02-16 PROCEDURE — 0JBR0ZZ EXCISION OF LEFT FOOT SUBCUTANEOUS TISSUE AND FASCIA, OPEN APPROACH: ICD-10-PCS | Performed by: PODIATRIST

## 2021-02-16 PROCEDURE — 99232 SBSQ HOSP IP/OBS MODERATE 35: CPT | Performed by: INTERNAL MEDICINE

## 2021-02-16 PROCEDURE — C1781 MESH (IMPLANTABLE): HCPCS | Performed by: PODIATRIST

## 2021-02-16 PROCEDURE — 82948 REAGENT STRIP/BLOOD GLUCOSE: CPT

## 2021-02-16 PROCEDURE — 80048 BASIC METABOLIC PNL TOTAL CA: CPT | Performed by: PODIATRIST

## 2021-02-16 PROCEDURE — 85027 COMPLETE CBC AUTOMATED: CPT | Performed by: PODIATRIST

## 2021-02-16 DEVICE — (12 SQ CM)-ALLOGRAFT GRAFIX PRIME 3 X 4CM: Type: IMPLANTABLE DEVICE | Site: FOOT | Status: FUNCTIONAL

## 2021-02-16 RX ORDER — PROMETHAZINE HYDROCHLORIDE 25 MG/ML
6.25 INJECTION, SOLUTION INTRAMUSCULAR; INTRAVENOUS ONCE AS NEEDED
Status: DISCONTINUED | OUTPATIENT
Start: 2021-02-16 | End: 2021-02-16 | Stop reason: HOSPADM

## 2021-02-16 RX ORDER — LIDOCAINE HYDROCHLORIDE 20 MG/ML
INJECTION, SOLUTION EPIDURAL; INFILTRATION; INTRACAUDAL; PERINEURAL AS NEEDED
Status: DISCONTINUED | OUTPATIENT
Start: 2021-02-16 | End: 2021-02-16

## 2021-02-16 RX ORDER — MEPERIDINE HYDROCHLORIDE 25 MG/ML
12.5 INJECTION INTRAMUSCULAR; INTRAVENOUS; SUBCUTANEOUS ONCE AS NEEDED
Status: DISCONTINUED | OUTPATIENT
Start: 2021-02-16 | End: 2021-02-16 | Stop reason: HOSPADM

## 2021-02-16 RX ORDER — FENTANYL CITRATE 50 UG/ML
INJECTION, SOLUTION INTRAMUSCULAR; INTRAVENOUS AS NEEDED
Status: DISCONTINUED | OUTPATIENT
Start: 2021-02-16 | End: 2021-02-16

## 2021-02-16 RX ORDER — EPHEDRINE SULFATE 50 MG/ML
INJECTION INTRAVENOUS AS NEEDED
Status: DISCONTINUED | OUTPATIENT
Start: 2021-02-16 | End: 2021-02-16

## 2021-02-16 RX ORDER — FENTANYL CITRATE/PF 50 MCG/ML
50 SYRINGE (ML) INJECTION
Status: DISCONTINUED | OUTPATIENT
Start: 2021-02-16 | End: 2021-02-16 | Stop reason: HOSPADM

## 2021-02-16 RX ORDER — PROPOFOL 10 MG/ML
INJECTION, EMULSION INTRAVENOUS AS NEEDED
Status: DISCONTINUED | OUTPATIENT
Start: 2021-02-16 | End: 2021-02-16

## 2021-02-16 RX ORDER — ONDANSETRON 2 MG/ML
4 INJECTION INTRAMUSCULAR; INTRAVENOUS ONCE AS NEEDED
Status: DISCONTINUED | OUTPATIENT
Start: 2021-02-16 | End: 2021-02-16 | Stop reason: HOSPADM

## 2021-02-16 RX ORDER — MAGNESIUM HYDROXIDE 1200 MG/15ML
LIQUID ORAL AS NEEDED
Status: DISCONTINUED | OUTPATIENT
Start: 2021-02-16 | End: 2021-02-16 | Stop reason: HOSPADM

## 2021-02-16 RX ORDER — ONDANSETRON 2 MG/ML
INJECTION INTRAMUSCULAR; INTRAVENOUS AS NEEDED
Status: DISCONTINUED | OUTPATIENT
Start: 2021-02-16 | End: 2021-02-16

## 2021-02-16 RX ORDER — HYDROMORPHONE HCL/PF 1 MG/ML
0.5 SYRINGE (ML) INJECTION
Status: DISCONTINUED | OUTPATIENT
Start: 2021-02-16 | End: 2021-02-16 | Stop reason: HOSPADM

## 2021-02-16 RX ADMIN — TAMSULOSIN HYDROCHLORIDE 0.4 MG: 0.4 CAPSULE ORAL at 21:21

## 2021-02-16 RX ADMIN — PROPOFOL 150 MG: 10 INJECTION, EMULSION INTRAVENOUS at 11:30

## 2021-02-16 RX ADMIN — LATANOPROST 1 DROP: 50 SOLUTION OPHTHALMIC at 21:22

## 2021-02-16 RX ADMIN — ISOSORBIDE MONONITRATE 30 MG: 30 TABLET, EXTENDED RELEASE ORAL at 09:33

## 2021-02-16 RX ADMIN — SODIUM CHLORIDE 50 ML/HR: 0.9 INJECTION, SOLUTION INTRAVENOUS at 01:06

## 2021-02-16 RX ADMIN — SERTRALINE HYDROCHLORIDE 50 MG: 50 TABLET ORAL at 09:33

## 2021-02-16 RX ADMIN — LIDOCAINE HYDROCHLORIDE 100 MG: 20 INJECTION, SOLUTION EPIDURAL; INFILTRATION; INTRACAUDAL; PERINEURAL at 11:30

## 2021-02-16 RX ADMIN — CEFAZOLIN SODIUM 2000 MG: 2 SOLUTION INTRAVENOUS at 05:11

## 2021-02-16 RX ADMIN — ASPIRIN 81 MG: 81 TABLET, COATED ORAL at 09:33

## 2021-02-16 RX ADMIN — ONDANSETRON 4 MG: 2 INJECTION INTRAMUSCULAR; INTRAVENOUS at 11:30

## 2021-02-16 RX ADMIN — ACETAMINOPHEN 650 MG: 325 TABLET ORAL at 21:22

## 2021-02-16 RX ADMIN — Medication 3000 MG: at 11:24

## 2021-02-16 RX ADMIN — FENTANYL CITRATE 50 MCG: 50 INJECTION, SOLUTION INTRAMUSCULAR; INTRAVENOUS at 11:30

## 2021-02-16 RX ADMIN — EPHEDRINE SULFATE 10 MG: 50 INJECTION, SOLUTION INTRAVENOUS at 11:55

## 2021-02-16 RX ADMIN — INSULIN LISPRO 3 UNITS: 100 INJECTION, SOLUTION INTRAVENOUS; SUBCUTANEOUS at 16:50

## 2021-02-16 RX ADMIN — GABAPENTIN 600 MG: 300 CAPSULE ORAL at 09:33

## 2021-02-16 RX ADMIN — INSULIN GLARGINE 10 UNITS: 100 INJECTION, SOLUTION SUBCUTANEOUS at 21:21

## 2021-02-16 RX ADMIN — METOPROLOL SUCCINATE 100 MG: 50 TABLET, EXTENDED RELEASE ORAL at 09:33

## 2021-02-16 RX ADMIN — SODIUM CHLORIDE 50 ML/HR: 0.9 INJECTION, SOLUTION INTRAVENOUS at 17:04

## 2021-02-16 RX ADMIN — DOCUSATE SODIUM 100 MG: 100 CAPSULE, LIQUID FILLED ORAL at 17:57

## 2021-02-16 RX ADMIN — OXYCODONE HYDROCHLORIDE 5 MG: 5 TABLET ORAL at 21:22

## 2021-02-16 NOTE — PROGRESS NOTES
[facility] Progress Note  Patient: Manuel Rodgers 64 y o  male   MRN: [id number]  PCP: Adrianna Garcia MD  Unit/Bed#: E5 -01 Encounter: [id number]  Date Of Visit: 02/16/21      Assessment/plan  1  Non healing left surgical foot wound- poa- per podiatry  S/p left foot wound debridement with graft application       2  Recurrent Hematuria- due to urothelial carcinoma of bladder  Urology had been following pt and he is s/p cysto with fulguration of bleeding areas within prostate  He also had a cbi  Hematuria has resolved  Will continue to hold heparin  Will continue asa  3  Type 2 diabetes- a1c is 7 0  he will continue with lantus of 10 units  He will continue insulin sliding scale     4  ckd IV- appreciate nephrology eval  Baseline creatinine is 2 2-2 5  creatinine remains at baseline  Check bmp in am       5  Cad- currently asymptomatic  Continue toprol, imdur, and asa  Subjective:   Pt seen and examined  Pts birthday is today and wished him happy birthday  He denies further hematuria  No f/c no cp no sob no n/v/d no abd pain  Objective:     Vitals: Blood pressure 136/81, pulse 71, temperature 97 8 °F (36 6 °C), temperature source Temporal, resp  rate 18, height 6' 4" (1 93 m), weight 107 kg (235 lb 14 3 oz), SpO2 95 %  ,Body mass index is 28 71 kg/m²  Lab, Imaging and other studies:  Results from last 7 days   Lab Units 02/16/21  0527   WBC Thousand/uL 7 97   HEMOGLOBIN g/dL 9 1*   HEMATOCRIT % 28 0*   PLATELETS Thousands/uL 184     Results from last 7 days   Lab Units 02/16/21  0527   POTASSIUM mmol/L 4 2   CHLORIDE mmol/L 104   CO2 mmol/L 26   BUN mg/dL 34*   CREATININE mg/dL 2 06*   CALCIUM mg/dL 9 1         Lab Results   Component Value Date    BLOODCX No Growth After 5 Days  12/21/2020    BLOODCX No Growth After 5 Days  12/21/2020    BLOODCX No Growth After 5 Days   12/18/2020    URINECX No Growth <1000 cfu/mL 02/11/2021    URINECX <10,000 cfu/ml Staphylococcus coagulase negative (A) 02/09/2021    URINECX No Growth <1000 cfu/mL 11/16/2020    WOUNDCULT 1+ Growth of Staphylococcus aureus (A) 12/21/2020    WOUNDCULT Few Colonies of Enterobacter aerogenes (A) 12/21/2020    WOUNDCULT 2+ Growth of Enterobacter aerogenes (A) 12/18/2020    WOUNDCULT 4+ Growth of Staphylococcus aureus (A) 12/18/2020     Scheduled Meds:   Current Facility-Administered Medications   Medication Dose Route Frequency Provider Last Rate    acetaminophen  650 mg Oral Q6H PRN Mliss Mems, DPM      ALPRAZolam  0 25 mg Oral BID PRN Mliss Mems, DPM      aspirin  81 mg Oral Daily Mliss Mems, DPM      bismuth subsalicylate  721 mg Oral Q6H PRN Mliss Mems, DPM      docusate sodium  100 mg Oral BID Mliss Mems, DPM      fluticasone  1 spray Each Nare BID Mliss Mems, DPM      gabapentin  600 mg Oral Daily Mliss Mems, DPM      insulin glargine  10 Units Subcutaneous HS Mliss Mems, DPM      insulin lispro  1-6 Units Subcutaneous TID AC Mliss Mems, DPM      isosorbide mononitrate  30 mg Oral Daily Mliss Mems, DPM      latanoprost  1 drop Both Eyes HS Mliss Mems, DPM      metoprolol succinate  100 mg Oral Daily Mliss Mems, DPM      oxyCODONE  5 mg Oral Q4H PRN Mliss Mems, DPM      pantoprazole  40 mg Oral Early Morning Mliss Mems, DPM      polyethylene glycol  17 g Oral Daily Mliss Mems, DPM      sertraline  50 mg Oral Daily Mliss Mems, DPM      sodium chloride  50 mL/hr Intravenous Continuous Mliss Mems, DPM 50 mL/hr (02/16/21 1704)    tamsulosin  0 4 mg Oral HS Mliss Mems, DPM      zolpidem  5 mg Oral HS PRN Mliss Mems, DPM       Continuous Infusions: sodium chloride, 50 mL/hr, Last Rate: 50 mL/hr (02/16/21 1704)      PRN Meds:   acetaminophen    ALPRAZolam    bismuth subsalicylate    oxyCODONE    zolpidem      Physical exam:  Physical Exam  Constitutional:       Appearance: Normal appearance  HENT:      Head: Normocephalic and atraumatic     Eyes: Extraocular Movements: Extraocular movements intact  Pupils: Pupils are equal, round, and reactive to light  Cardiovascular:      Rate and Rhythm: Normal rate and regular rhythm  Heart sounds: No murmur  No friction rub  No gallop  Pulmonary:      Effort: Pulmonary effort is normal  No respiratory distress  Breath sounds: Normal breath sounds  No wheezing or rales  Abdominal:      General: Bowel sounds are normal  There is no distension  Palpations: Abdomen is soft  Tenderness: There is no abdominal tenderness  There is no guarding  Neurological:      Mental Status: He is alert and oriented to person, place, and time

## 2021-02-16 NOTE — OP NOTE
OPERATIVE REPORT - Podiatry  PATIENT NAME: Bertrand Rodriguez    :  1945  MRN: [id number]  Pt Location: [facility] OR ROOM 04    SURGERY DATE: 2021    Surgeon(s) and Role:     * Kimmy Jaimes DPM - Primary     * Aleatha Merlin, DPM - Assisting    Pre-op Diagnosis:  Diabetic ulcer of left midfoot associated with type 2 diabetes mellitus, 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 ([facility] [address] ) [S12 871, L97 426]    Post-Op Diagnosis Codes:     * Diabetic ulcer of left midfoot associated with type 2 diabetes mellitus, 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 ([facility] [address] ) [V57 206, L97 426]    Procedure(s) (LRB):  Left foot wound debridement w/graft application     Specimen(s):  * No specimens in log *    Estimated Blood Loss:   Minimal    Drains:  * No LDAs found *    Anesthesia Type:   Choice with 10 ml of 1% Lidocaine and 0 5% Bupivacaine in a 1:1 mixture    Hemostasis:  Pressure    Materials:  Implant Name Type Inv  Item Serial No   Lot No  LRB No  Used Action   [id number]  (12 SQ CM)-ALLOGRAFT GRAFIX PRIME 3 X 4CM  [id number] [employer]  Left 1 Implanted       Operative Findings:  Consistent with diagnosis    Complications:   None    Procedure and Technique:     Under mild sedation, the patient was brought into the operating room and placed on the operating room table in the supine position  IV sedation was achieved by anesthesia team and a universal timeout was performed where all parties are in agreement of correct patient, correct procedure and correct site  A proximal V block was performed consisting of 10 ml of 1% Lidocaine and 0 5% Bupivacaine in a 1:1 mixture  The foot was then prepped and draped in the usual aseptic manner       Three wounds located at lateral foot (Pre-debridement wound measurements 1 0x0 5x0 2cm, 0 5x0 1x0 2cm, 1 0x1 0x0 2cm  were excisionally debrided with 15 blade & currette of all nonviable, devitalized, slough/fibrin/fibrous tissue w/ excision of subcutaneous tissue, soft tissue to depth of soft tissue  Deepest tissue noted in wound bed was bone - noted to be hard and viable  Post debridement wound measurements 1 0x0 5x0 5cm, 0 5x0  1x0 5cm, 1 2x1 6x0 7cm, with appearance of wounds being fresh bleeding tissue with viable 100% vs nonviable 0%, <10sq cm debrided total (all 3 wounds)  The wounds were then flushed with nss and patted dry  Grafix were placed into each of the wound beds and covered with wound veil, steristrips and dry compressive dressing applied  The patient tolerated the procedure and anesthesia well without immediate complications and transferred to PACU with vital signs stable  As with many limb salvage procedures, we contemplate the possibility of performing further stages to this procedure  Procedures may include debridements, delayed closure, plastic surgery techniques, or more proximal amputations  This procedure may be considered part of a multi-staged limb salvage treatment plan  Dr Clay Gerber was present during the entire procedure and participated in all key aspects  SIGNATURE: John Morin DPM  DATE: February 16, 2021  TIME: 11:59 AM      Portions of the record may have been created with voice recognition software  Occasional wrong word or "sound a like" substitutions may have occurred due to the inherent limitations of voice recognition software  Read the chart carefully and recognize, using context, where substitutions have occurred

## 2021-02-16 NOTE — CASE MANAGEMENT
Patient is s/p Left foot wound debridement w/graft application today  Patient pending medical clearance for discharge  Patient has [facility] at home, [phone] for wound care when discharged  Patient owns DME  CM will continue to follow

## 2021-02-16 NOTE — PROGRESS NOTES
NEPHROLOGY PROGRESS NOTE   Alia Livers 64 y o  male MRN: [id number]  Unit/Bed#: E5 -01 Encounter: [id number]  Reason for Consult: CKD Stage IV    Plan:  -renal function remains within baseline   -for OR today for debridement of left food wound with graphix graft application   -normal saline at 50 mL/hr while NPO   -avoid hypotension  Antihypertensives holding parameters adjusted        ASSESSMENT/PLAN:  CKD stage IV:  Suspected secondary to hypertensive nephrosclerosis, diabetic nephropathy, and renal vascular disease   -baseline creatinine 2 2-2 5   -status post a gram on 02/08 with repeat a gram on 02/11  Will continue to monitor for ROSSY  -follows with [facility] specialist   -continue to avoid hypotension, IV contrast, nephrotoxins  -will continue to monitor and trend BMP   -I/O      Hypertension:  Blood pressure well controlled  -continues on Imdur and metoprolol   -avoid hypotension or high fluctuations in blood pressure   -recommend holding parameters antihypertensive for systolic blood pressure less than 130       History of bladder cancer with gross hematuria:  Patient reported hematuria yesterday morning that is now resolved  Status post cystoscopy with fulguration on 02/10/2021 without evidence of recurrence of bladder tumors  -recommend Urology input      PVD with nonhealing left foot wound:  Plan for OR today for debridement with graphix graft application    -continues on antibiotics  -currently on normal saline at 50 mL/hr while NPO      Other:  Hyperlipidemia, arthritis, diabetes, CAD      Disposition:  Requiring additional stay due to medical needs  SUBJECTIVE:  Patient seen lying in bed  He denies chest pain, SOB  Denies N/V/D  Patient states his hematuria is resolved today      OBJECTIVE:  Current Weight: Weight - Scale: 107 kg (235 lb 14 3 oz)  Vitals:    02/15/21 1900 02/15/21 2300 02/16/21 0559 02/16/21 0805   BP: 133/87 130/70  165/95   BP Location: Right arm Left arm  Left arm   Pulse: 65 81  66   Resp: 20 18  18   Temp: 98 °F (36 7 °C) 98 5 °F (36 9 °C)  98 1 °F (36 7 °C)   TempSrc: Temporal Temporal  Temporal   SpO2: 98% 97%  98%   Weight:   107 kg (235 lb 14 3 oz)    Height:           Intake/Output Summary (Last 24 hours) at 2/16/2021 1029  Last data filed at 2/16/2021 8726  Gross per 24 hour   Intake 1000 ml   Output 450 ml   Net 550 ml     General: NAD  Skin: warm, dry, intact, no rash  HEENT: Moist mucous membranes, sclera anicteric, normocephalic, atraumatic  Neck: No apparent JVD appreciated  Chest: lung sounds clear B/L, on RA   CVS:Regular rate and rhythm, no murmer   Abdomen: Soft, round, non-tender, +BS  Extremities: LLE edema; foot wrapped in ACE  Neuro: alert and oriented  Psych: appropriate mood and affect     Medications:    Current Facility-Administered Medications:     acetaminophen (TYLENOL) tablet 650 mg, 650 mg, Oral, Q6H PRN, Zohaib Chauhan MD, 650 mg at 02/14/21 1730    ALPRAZolam (XANAX) tablet 0 25 mg, 0 25 mg, Oral, BID PRN, Zohaib Chauhan MD    aspirin (ECOTRIN LOW STRENGTH) EC tablet 81 mg, 81 mg, Oral, Daily, Chelsi Ambron, DO, 81 mg at 02/16/21 0933    bismuth subsalicylate (PEPTO BISMOL) oral suspension 524 mg, 524 mg, Oral, Q6H PRN, Zohaib Chauhan MD    docusate sodium (COLACE) capsule 100 mg, 100 mg, Oral, BID, Zohaib Chauhan MD, 100 mg at 02/15/21 0915    fluticasone (FLONASE) 50 mcg/act nasal spray 1 spray, 1 spray, Each Nare, BID, Zohaib Chauhan MD, 1 spray at 02/14/21 0831    gabapentin (NEURONTIN) capsule 600 mg, 600 mg, Oral, Daily, Zohaib Chauhan MD, 600 mg at 02/16/21 0933    insulin glargine (LANTUS) subcutaneous injection 10 Units 0 1 mL, 10 Units, Subcutaneous, HS, Zohaib Chauhan MD, 10 Units at 02/15/21 2118    insulin lispro (HumaLOG) 100 units/mL subcutaneous injection 1-6 Units, 1-6 Units, Subcutaneous, TID AC, 1 Units at 02/15/21 1217 **AND** Fingerstick Glucose (POCT), , , TID AC, Zohaib Chauhan MD    isosorbide mononitrate (IMDUR) 24 hr tablet 30 mg, 30 mg, Oral, Daily, Favian Garcia MD, 30 mg at 02/16/21 0933    latanoprost (XALATAN) 0 005 % ophthalmic solution 1 drop, 1 drop, Both Eyes, HS, Favian Garcia MD, 1 drop at 02/15/21 2120    metoprolol succinate (TOPROL-XL) 24 hr tablet 100 mg, 100 mg, Oral, Daily, Favian Garcia MD, 100 mg at 02/16/21 0933    oxyCODONE (ROXICODONE) IR tablet 5 mg, 5 mg, Oral, Q4H PRN, Favian Garcia MD, 5 mg at 02/15/21 0914    pantoprazole (PROTONIX) EC tablet 40 mg, 40 mg, Oral, Early Morning, Favian Garcia MD, 40 mg at 02/15/21 0529    polyethylene glycol (MIRALAX) packet 17 g, 17 g, Oral, Daily, Germain Echeverria DO, 17 g at 02/13/21 6600    sertraline (ZOLOFT) tablet 50 mg, 50 mg, Oral, Daily, Favian Garcia MD, 50 mg at 02/16/21 0933    sodium chloride 0 9 % infusion, 50 mL/hr, Intravenous, Continuous, Ariane Sanchez DPM, Last Rate: 50 mL/hr at 02/16/21 0106, 50 mL/hr at 02/16/21 0106    tamsulosin (FLOMAX) capsule 0 4 mg, 0 4 mg, Oral, HS, Favian Garcia MD, 0 4 mg at 02/15/21 2119    zolpidem (AMBIEN) tablet 5 mg, 5 mg, Oral, HS PRN, Favian Garcia MD, 5 mg at 02/15/21 2119    Laboratory Results:  Results from last 7 days   Lab Units 02/16/21  0527 02/15/21  0522 02/13/21  0544 02/12/21  0449 02/11/21  0508   WBC Thousand/uL 7 97 7 37  --   --  9 84   HEMOGLOBIN g/dL 9 1* 9 4*  --   --  9 7*   HEMATOCRIT % 28 0* 28 5*  --   --  29 2*   PLATELETS Thousands/uL 184 188  --   --  180   SODIUM mmol/L 138 138 138 137 136   POTASSIUM mmol/L 4 2 4 2 4 3 4 5 4 6   CHLORIDE mmol/L 104 104 103 104 104   CO2 mmol/L 26 25 26 26 23   BUN mg/dL 34* 31* 30* 29* 30*   CREATININE mg/dL 2 06* 2 02* 2 04* 2 03* 2 14*   CALCIUM mg/dL 9 1 8 7 8 7 8 4 8 4   MAGNESIUM mg/dL  --   --   --  1 9 1 9   PHOSPHORUS mg/dL  --   --   --  3 7 3 6

## 2021-02-16 NOTE — PROGRESS NOTES
[facility] - Pre Operative Note  Patient: Francisco Harden 64 y o  male   MRN: [id number]  PCP: Damaso Reeder MD  Unit/Bed#: E5 -01 Encounter: [id number]  Date Of Visit: 21      Assessment:      1  Non-healing left foot surgical wound - POA  - s/p left partial 5th met resection due to positive margins 20  - s/p left partial 5th ray resection due to osteomyelitis 20  2  PAD  - s/p LLE agram 21  3  T2DM  4  Htn  5  Hyperlipidemia  6  CAD  7  CKD stage 3      PLAN:    · Patient to go to OR today, 21, for  left  Wound debridement with grafix and possible wound vac application with Dr Osorio Izaguirre  · Will continue NWB following surgery  · IV ancef on call to OR then discontinue following surgery  · Consent placed in chart  · Confirmed NPO status  · H&P, vitals, and current labs reviewed  No acute changes noted  · Alternatives, risks, and complications discussed with patient  · All questions answered  No guarantees given to outcome of procedure  SUBJECTIVE:     Chief Complaint: No chief complaint on file  The patient was seen, evaluated, and assessed at bedside today  The patient was awake, alert, and in no acute distress  Patient confirmed NPO status  All questions and concerns regarding the surgical procedure addressed  Patient understands risks vs benefits of procedure and remains amenable with plan for surgery today  Patient denies N/V/F/chills/SOB/CP  OBJECTIVE:     Vitals:   /70 (BP Location: Left arm)   Pulse 81   Temp 98 5 °F (36 9 °C) (Temporal)   Resp 18   Ht 6' 4" (1 93 m)   Wt 107 kg (235 lb 14 3 oz)   SpO2 97%   BMI 28 71 kg/m²     Temp (24hrs), Av °F (36 7 °C), Min:97 4 °F (36 3 °C), Max:98 5 °F (36 9 °C)      PHYSICAL EXAM:     General: Alert, cooperative and no distress  Lungs: Non labored breathing  Abdomen: Soft, non-tender  Extremity:     NVS at baseline B/l  MSK function at baseline B/l  No calf tenderness noted B/l   Dressing is left intact to the OR  Additional Data:     Labs:    Results from last 7 days   Lab Units 02/16/21  0527  02/11/21  0508   WBC Thousand/uL 7 97   < > 9 84   HEMOGLOBIN g/dL 9 1*   < > 9 7*   HEMATOCRIT % 28 0*   < > 29 2*   PLATELETS Thousands/uL 184   < > 180   NEUTROS PCT %  --   --  69   LYMPHS PCT %  --   --  21   MONOS PCT %  --   --  7   EOS PCT %  --   --  3    < > = values in this interval not displayed  Results from last 7 days   Lab Units 02/16/21  0527   POTASSIUM mmol/L 4 2   CHLORIDE mmol/L 104   CO2 mmol/L 26   BUN mg/dL 34*   CREATININE mg/dL 2 06*   CALCIUM mg/dL 9 1           * I Have Reviewed All Lab Data Listed Above  Recent Cultures (last 7 days):     Results from last 7 days   Lab Units 02/11/21  0514 02/09/21  1046   URINE CULTURE  No Growth <1000 cfu/mL <10,000 cfu/ml Staphylococcus coagulase negative*       Imaging: I have personally reviewed pertinent films in PACS  EKG, Pathology, and Other Studies: I have personally reviewed pertinent reports  Today, Patient Was Seen By: Yaritza Milligan DPM    ** Please Note: Portions of the record may have been created with voice recognition software  Occasional wrong word or "sound a like" substitutions may have occurred due to the inherent limitations of voice recognition software  Read the chart carefully and recognize, using context, where substitutions have occurred   **

## 2021-02-16 NOTE — ANESTHESIA POSTPROCEDURE EVALUATION
Post-Op Assessment Note    CV Status:  Stable    Pain management: adequate     Mental Status:  Alert and awake   Hydration Status:  Euvolemic   PONV Controlled:  Controlled   Airway Patency:  Patent      Post Op Vitals Reviewed: Yes      Staff: Anesthesiologist         No complications documented      /79 (02/16/21 1241)    Temp 98 °F (36 7 °C) (02/16/21 1241)    Pulse 64 (02/16/21 1241)   Resp 12 (02/16/21 1241)    SpO2 100 % (02/16/21 1241)

## 2021-02-17 ENCOUNTER — TELEPHONE (OUTPATIENT)
Dept: OTHER | Facility: HOSPITAL | Age: 76
End: 2021-02-17

## 2021-02-17 VITALS
HEART RATE: 88 BPM | TEMPERATURE: 97.7 F | HEIGHT: 76 IN | OXYGEN SATURATION: 99 % | BODY MASS INDEX: 28.67 KG/M2 | DIASTOLIC BLOOD PRESSURE: 87 MMHG | SYSTOLIC BLOOD PRESSURE: 135 MMHG | RESPIRATION RATE: 18 BRPM | WEIGHT: 235.45 LBS

## 2021-02-17 LAB
ANION GAP SERPL CALCULATED.3IONS-SCNC: 8 MMOL/L (ref 4–13)
BUN SERPL-MCNC: 33 MG/DL (ref 5–25)
CALCIUM SERPL-MCNC: 8.5 MG/DL (ref 8.3–10.1)
CHLORIDE SERPL-SCNC: 104 MMOL/L (ref 100–108)
CO2 SERPL-SCNC: 26 MMOL/L (ref 21–32)
CREAT SERPL-MCNC: 2.08 MG/DL (ref 0.6–1.3)
ERYTHROCYTE [DISTWIDTH] IN BLOOD BY AUTOMATED COUNT: 13.3 % (ref 11.6–15.1)
FERRITIN SERPL-MCNC: 23 NG/ML (ref 8–388)
GFR SERPL CREATININE-BSD FRML MDRD: 34 ML/MIN/1.73SQ M
GLUCOSE SERPL-MCNC: 108 MG/DL (ref 65–140)
GLUCOSE SERPL-MCNC: 109 MG/DL (ref 65–140)
GLUCOSE SERPL-MCNC: 98 MG/DL (ref 65–140)
HCT VFR BLD AUTO: 28.5 % (ref 36.5–49.3)
HGB BLD-MCNC: 9.2 G/DL (ref 12–17)
IRON SATN MFR SERPL: 18 %
IRON SERPL-MCNC: 50 UG/DL (ref 65–175)
MCH RBC QN AUTO: 30.4 PG (ref 26.8–34.3)
MCHC RBC AUTO-ENTMCNC: 32.3 G/DL (ref 31.4–37.4)
MCV RBC AUTO: 94 FL (ref 82–98)
PLATELET # BLD AUTO: 192 THOUSANDS/UL (ref 149–390)
PMV BLD AUTO: 9.8 FL (ref 8.9–12.7)
POTASSIUM SERPL-SCNC: 4.7 MMOL/L (ref 3.5–5.3)
RBC # BLD AUTO: 3.03 MILLION/UL (ref 3.88–5.62)
SODIUM SERPL-SCNC: 138 MMOL/L (ref 136–145)
TIBC SERPL-MCNC: 285 UG/DL (ref 250–450)
WBC # BLD AUTO: 7.56 THOUSAND/UL (ref 4.31–10.16)

## 2021-02-17 PROCEDURE — 82948 REAGENT STRIP/BLOOD GLUCOSE: CPT

## 2021-02-17 PROCEDURE — 85027 COMPLETE CBC AUTOMATED: CPT | Performed by: STUDENT IN AN ORGANIZED HEALTH CARE EDUCATION/TRAINING PROGRAM

## 2021-02-17 PROCEDURE — 83550 IRON BINDING TEST: CPT | Performed by: INTERNAL MEDICINE

## 2021-02-17 PROCEDURE — 80048 BASIC METABOLIC PNL TOTAL CA: CPT | Performed by: STUDENT IN AN ORGANIZED HEALTH CARE EDUCATION/TRAINING PROGRAM

## 2021-02-17 PROCEDURE — 82728 ASSAY OF FERRITIN: CPT | Performed by: INTERNAL MEDICINE

## 2021-02-17 PROCEDURE — 99232 SBSQ HOSP IP/OBS MODERATE 35: CPT | Performed by: NURSE PRACTITIONER

## 2021-02-17 PROCEDURE — 83540 ASSAY OF IRON: CPT | Performed by: INTERNAL MEDICINE

## 2021-02-17 RX ORDER — OXYCODONE HYDROCHLORIDE AND ACETAMINOPHEN 5; 325 MG/1; MG/1
1 TABLET ORAL EVERY 4 HOURS PRN
Qty: 5 TABLET | Refills: 0 | Status: SHIPPED | OUTPATIENT
Start: 2021-02-17 | End: 2021-02-27

## 2021-02-17 RX ADMIN — PANTOPRAZOLE SODIUM 40 MG: 40 TABLET, DELAYED RELEASE ORAL at 05:46

## 2021-02-17 RX ADMIN — SERTRALINE HYDROCHLORIDE 50 MG: 50 TABLET ORAL at 08:08

## 2021-02-17 RX ADMIN — ACETAMINOPHEN 650 MG: 325 TABLET ORAL at 11:35

## 2021-02-17 RX ADMIN — ISOSORBIDE MONONITRATE 30 MG: 30 TABLET, EXTENDED RELEASE ORAL at 08:08

## 2021-02-17 RX ADMIN — ACETAMINOPHEN 650 MG: 325 TABLET ORAL at 05:45

## 2021-02-17 RX ADMIN — METOPROLOL SUCCINATE 100 MG: 50 TABLET, EXTENDED RELEASE ORAL at 08:08

## 2021-02-17 RX ADMIN — OXYCODONE HYDROCHLORIDE 5 MG: 5 TABLET ORAL at 05:45

## 2021-02-17 RX ADMIN — ASPIRIN 81 MG: 81 TABLET, COATED ORAL at 08:08

## 2021-02-17 RX ADMIN — OXYCODONE HYDROCHLORIDE 5 MG: 5 TABLET ORAL at 11:36

## 2021-02-17 RX ADMIN — DOCUSATE SODIUM 100 MG: 100 CAPSULE, LIQUID FILLED ORAL at 08:08

## 2021-02-17 RX ADMIN — GABAPENTIN 600 MG: 300 CAPSULE ORAL at 08:08

## 2021-02-17 NOTE — PROGRESS NOTES
NEPHROLOGY PROGRESS NOTE   Amanda Yip 64 y o  male MRN: [id number]  Unit/Bed#: E5 -01 Encounter: [id number]  Reason for Consult: CKD IV    PLAN:   -creatinine remains within baseline   -status post contrast 2/8 and 2/11   -status post wound debridement with graft application to left lower extremity wound on 02/16   -blood pressure remains stable   -hematuria resolved  -okay to discharge from renal with follow up with his OP nephrologist with [facility]  ASSESSMENT/PLAN:  CKD stage IV:  Suspected secondary to hypertensive nephrosclerosis, diabetic nephropathy, and renal vascular disease   -baseline creatinine 2 2-2 5   -creatinine remains within baseline   -status post a gram on 02/08 with repeat a gram on 02/11  Carlos Me continue to monitor for ROSSY  -follows with [facility] specialist   -takes Lasix 20 mg as needed at home   -continue to avoid hypotension, IV contrast, nephrotoxins  -will continue to monitor and trend BMP   -I/O      Hypertension:  Blood pressure well controlled  -continues on Imdur and metoprolol   -avoid hypotension or high fluctuations in blood pressure   -recommend holding parameters antihypertensive for systolic blood pressure less than 130       History of bladder cancer with gross hematuria:  Patient reported hematuria yesterday morning that is now resolved   Status post cystoscopy with fulguration on 02/10/2021 without evidence of recurrence of bladder tumors  -recommend Urology input      PVD with nonhealing left foot wound:  Plan for OR today for debridement with graphix graft application    -continues on antibiotics  -currently on normal saline at 50 mL/hr while NPO  Anemia:  Previous hematuria which has resolved    -iron panel showed low iron, normal ferritin, TIBC, and iron sat 18   -will continue to monitor and transfuse as needed for hemoglobin less than 7 0        Other:  Hyperlipidemia, arthritis, diabetes, CAD      Disposition:  Okay to discharge from Renal once medically cleared  SUBJECTIVE:  The patient denies any complaints  States he is going home today       OBJECTIVE:  Current Weight: Weight - Scale: 107 kg (235 lb 7 2 oz)  Vitals:    02/16/21 1900 02/16/21 2300 02/17/21 0543 02/17/21 0748   BP: 140/83 153/92  135/87   BP Location: Right arm Left arm  Left arm   Pulse: 75 64  88   Resp: 18 18  18   Temp: (!) 97 2 °F (36 2 °C) 98 1 °F (36 7 °C)  97 7 °F (36 5 °C)   TempSrc: Tympanic Temporal  Temporal   SpO2: 100% 97%  99%   Weight:   107 kg (235 lb 7 2 oz)    Height:           Intake/Output Summary (Last 24 hours) at 2/17/2021 1209  Last data filed at 2/17/2021 0801  Gross per 24 hour   Intake --   Output 575 ml   Net -575 ml     General: NAD  Skin: warm, dry, intact, no rash  HEENT: Moist mucous membranes, sclera anicteric, normocephalic, atraumatic  Neck: No apparent JVD appreciated  Chest: lung sounds clear B/L, on RA   CVS:Regular rate and rhythm, no murmer   Abdomen: Soft, round, non-tender, +BS  Extremities: LLE edema, foot wrapped  Neuro: alert and oriented  Psych: appropriate mood and affect     Medications:    Current Facility-Administered Medications:     acetaminophen (TYLENOL) tablet 650 mg, 650 mg, Oral, Q6H PRN, Marvie Alfonso, DPM, 650 mg at 02/17/21 1135    ALPRAZolam (XANAX) tablet 0 25 mg, 0 25 mg, Oral, BID PRN, Marvie Alfonso, DPM    aspirin (ECOTRIN LOW STRENGTH) EC tablet 81 mg, 81 mg, Oral, Daily, Marvie Alfonso, DPM, 81 mg at 02/17/21 0808    bismuth subsalicylate (PEPTO BISMOL) oral suspension 524 mg, 524 mg, Oral, Q6H PRN, Marvie Alfonso, DPM    docusate sodium (COLACE) capsule 100 mg, 100 mg, Oral, BID, Marvie Alfonso, DPM, 100 mg at 02/17/21 0808    fluticasone (FLONASE) 50 mcg/act nasal spray 1 spray, 1 spray, Each Nare, BID, Yael Hamilton DPM, 1 spray at 02/14/21 0831    gabapentin (NEURONTIN) capsule 600 mg, 600 mg, Oral, Daily, Yael Hamilton DPM, 600 mg at 02/17/21 0808    insulin glargine (LANTUS) subcutaneous injection 10 Units 0 1 mL, 10 Units, Subcutaneous, HS, Deannie Gemma, DPM, 10 Units at 02/16/21 2121    insulin lispro (HumaLOG) 100 units/mL subcutaneous injection 1-6 Units, 1-6 Units, Subcutaneous, TID AC, 3 Units at 02/16/21 1650 **AND** Fingerstick Glucose (POCT), , , TID AC, Deannie Gemma, DPM    isosorbide mononitrate (IMDUR) 24 hr tablet 30 mg, 30 mg, Oral, Daily, Deannie Gemma, DPM, 30 mg at 02/17/21 0808    latanoprost (XALATAN) 0 005 % ophthalmic solution 1 drop, 1 drop, Both Eyes, HS, Deannie Gemma, DPM, 1 drop at 02/16/21 2122    metoprolol succinate (TOPROL-XL) 24 hr tablet 100 mg, 100 mg, Oral, Daily, Deannie Gemma, DPM, 100 mg at 02/17/21 0808    oxyCODONE (ROXICODONE) IR tablet 5 mg, 5 mg, Oral, Q4H PRN, Deannie Gemma, DPM, 5 mg at 02/17/21 1136    pantoprazole (PROTONIX) EC tablet 40 mg, 40 mg, Oral, Early Morning, Deannie Gemma, DPM, 40 mg at 02/17/21 0546    polyethylene glycol (MIRALAX) packet 17 g, 17 g, Oral, Daily, Deannie Gemma, DPM, 17 g at 02/13/21 9841    sertraline (ZOLOFT) tablet 50 mg, 50 mg, Oral, Daily, Deannie Gemma, DPM, 50 mg at 02/17/21 0808    sodium chloride 0 9 % infusion, 50 mL/hr, Intravenous, Continuous, Deannie Gemma, DPM, Last Rate: 50 mL/hr at 02/16/21 1704, 50 mL/hr at 02/16/21 1704    tamsulosin (FLOMAX) capsule 0 4 mg, 0 4 mg, Oral, HS, Deannie Gemma, DPM, 0 4 mg at 02/16/21 2121    zolpidem (AMBIEN) tablet 5 mg, 5 mg, Oral, HS PRN, Deannie Gemma, DPM, 5 mg at 02/15/21 3972    Laboratory Results:  Results from last 7 days   Lab Units 02/17/21  0538 02/16/21  0527 02/15/21  0522  02/12/21  0449 02/11/21  0508   WBC Thousand/uL 7 56 7 97 7 37  --   --  9 84   HEMOGLOBIN g/dL 9 2* 9 1* 9 4*  --   --  9 7*   HEMATOCRIT % 28 5* 28 0* 28 5*  --   --  29 2*   PLATELETS Thousands/uL 192 184 188  --   --  180   SODIUM mmol/L 138 138 138   < > 137 136   POTASSIUM mmol/L 4 7 4 2 4 2   < > 4 5 4 6   CHLORIDE mmol/L 104 104 104   < > 104 104   CO2 mmol/L 26 26 25   < > 26 23   BUN mg/dL 33* 34* 31*   < > 29* 30*   CREATININE mg/dL 2 08* 2 06* 2 02*   < > 2 03* 2 14*   CALCIUM mg/dL 8 5 9 1 8 7   < > 8 4 8 4   MAGNESIUM mg/dL  --   --   --   --  1 9 1 9   PHOSPHORUS mg/dL  --   --   --   --  3 7 3 6    < > = values in this interval not displayed

## 2021-02-17 NOTE — PROGRESS NOTES
Progress Note - Podiatry  Jaida Ambrose 64 y o  male MRN: [id number]  Unit/Bed#: E5 -01 Encounter: [id number]    Assessment:     1  Non-healing left foot surgical wound - POA  - s/p left wound debridement and application of Grafix (DOS 2/16/21)  - s/p left partial 5th met resection due to positive margins 12/31/20  - s/p left partial 5th ray resection due to osteomyelitis 12/21/20  2  PAD  - s/p LLE agram 2/11/21  3  T2DM  4  Htn  5  Hyperlipidemia  6  CAD  7  CKD stage 3    Plan:  - Patient stable for d/c today  He is instructed to remain strictly NWB to left foot and f/u w/ Dr Evangelina Coon next week  - Appreciate med mngmt per SLIM and nephrology     WBS: NWB LLE  VTE ppx: heparin subq, SCD  Abx: IV ancef      Subjective/Objective   Chief Complaint: No chief complaint on file  Subjective: 64 y o  y/o male was seen and evaluated at bedside  Feels well, ready to go home  Pain well controlled    Blood pressure 135/87, pulse 88, temperature 97 7 °F (36 5 °C), temperature source Temporal, resp  rate 18, height 6' 4" (1 93 m), weight 107 kg (235 lb 7 2 oz), SpO2 99 %  ,Body mass index is 28 66 kg/m²  Invasive Devices     Peripheral Intravenous Line            Peripheral IV 02/16/21 Right;Ventral (anterior) Forearm 1 day          Line            Arterial Sheath 6 Fr  Left Femoral 5 days                Physical Exam:   General: Alert, cooperative and no distress    Extremity: Right LE msk, derm, nvs baseline  Left msk, nvs baseline  Dressing to left foot c/d/i without strikethrough            Lab, Imaging and other studies:   CBC:   Lab Results   Component Value Date    WBC 7 56 02/17/2021    HGB 9 2 (L) 02/17/2021    HCT 28 5 (L) 02/17/2021    MCV 94 02/17/2021     02/17/2021    MCH 30 4 02/17/2021    MCHC 32 3 02/17/2021    RDW 13 3 02/17/2021    MPV 9 8 02/17/2021   , CMP:   Lab Results   Component Value Date    SODIUM 138 02/17/2021    K 4 7 02/17/2021     02/17/2021    CO2 26 02/17/2021    BUN 33 (H) 02/17/2021    CREATININE 2 08 (H) 02/17/2021    CALCIUM 8 5 02/17/2021    EGFR 34 02/17/2021       Imaging: I have personally reviewed pertinent films in PACS  EKG, Pathology, and Other Studies: I have personally reviewed pertinent reports

## 2021-02-17 NOTE — TELEPHONE ENCOUNTER
Contacted the patient after he was discharged from the hospital today  I reviewed with him that his tissue cultures, pathology and now repeat urine cultures are without any growth/indication of mycobacterial organisms  I let him know that my suspicion for infection related to BCG therapy is very low given that prior to his admission his symptoms had actually been improving  He reports now that he again has intermittent dysuria but was also found to have hematuria and enlarged prostate on his past admission  I mentioned to the patient a similar case that I have had with Dr Calixto Harvey in the past and he may want to discuss intervention on his prostate to improve the flow of urine with his urologist when he is seen as an outpatient  Additionally reviewed urine cultures on this admission and those were negative  I let him know that have canceled additional imaging  He will not require further formal follow-up in the ID office  Additional questions answered  I let him know that should he require re-evaluation or any additional records he is welcome to contact our office or provider can send referral at that time  He expressed understanding to the above  Will forward phone call to office visit and patient's urologist for reference

## 2021-02-17 NOTE — DISCHARGE SUMMARY
Discharge Summary -   Bertrand Rodriguez 64 y o  male MRN: [id number]  Unit/Bed#: E5 -01 Encounter: [id number]    Admission Date: 2/7/2021     Admitting Diagnosis: Asymptomatic PVD (peripheral vascular disease) ([facility] [address] ) [I73 9]    HPI: "Bertrand Rodriguez is a 66 y o  male with history of CAD s/p cardiac stent, HLP, DM and stage 3 CKD presented for pre-admission for hydration for LLE angiogram tomorrow  He is s/p left 5th metatarsal resection and revision for left diabetic foot wound with delayed healing "     Procedures Performed:   1  Left lower extremity agram (02/08/2021)  2  Left lower extremity retrograde agram (02/11/2021)  3  LEFT FOOT DEBRIDEMENT, [address] w/graft application (68/20/3237)     Hospital Course:  Patient was originally admitted under vascular surgery service due to need for pre-admission hydration for left lower extremity angiogram   See below for treatment course  Significant Findings, Care, Treatment and Services Provided:   - per vascular surgery: "Patient was admitted for preop hydration per nephrology recommendations  Patient underwent LLE arteriogram with PT angiopalsty  With minmal response  Following this procedure the patient was noted to have gross hematuria  A urology consult was placed and the patient underwent CBI and subsequent cystoscopy with cautery of urthera and posterior bladder wall  Hematuria resolved and wagner was removed  Patient then underwent antegrade angiogram with atherectomy and angioplasty of PT with good result "   - per Podiatry take over his primary team:  IV Ancef was given throughout stay  Patient underwent left foot wound debridement with Grafix graft application 10/13/2879  Patient was deemed stable for discharge 02/17/2021   - nonweightbearing to left lower extremity  Outpatient PT consulted  - instructed to follow-up with vascular surgery, Podiatry, Nephrology, Internal Medicine as an outpatient      Complications: None    Discharge Diagnosis:  Same as admission    Condition at Discharge: stable     Discharge instructions/Information to patient and family:   See after visit summary for information provided to patient and family  Provisions for Follow-Up Care/Important appointments:  See after visit summary for information related to follow-up care and any pertinent home health orders  Disposition: Home    Planned Readmission: No    Discharge Statement   I spent 30 minutes discharging the patient  This time was spent on the day of discharge  I had direct contact with the patient on the day of discharge  The details of this patient's discharge     Discharge Medications:  See after visit summary for reconciled discharge medications provided to patient and family

## 2021-02-18 ENCOUNTER — TRANSITIONAL CARE MANAGEMENT (OUTPATIENT)
Dept: INTERNAL MEDICINE CLINIC | Facility: CLINIC | Age: 76
End: 2021-02-18

## 2021-02-18 DIAGNOSIS — Z71.89 COMPLEX CARE COORDINATION: Primary | ICD-10-CM

## 2021-02-19 NOTE — TELEPHONE ENCOUNTER
I do not see that the patient has a follow-up appointment with Urology  Please contact patient and see if he wishes to follow-up to discuss voiding issues  He has seen me and Dr Otero Gone recently

## 2021-02-22 ENCOUNTER — PATIENT OUTREACH (OUTPATIENT)
Dept: INTERNAL MEDICINE CLINIC | Facility: CLINIC | Age: 76
End: 2021-02-22

## 2021-02-22 NOTE — PROGRESS NOTES
Samuel Saldivar returned my call  He is managing well at home and denies needing additional assistance  Dressing intact to L foot with minimal drainage  He is NWB to L foot  Ambulatory with walker  He does not have home care services, attends weekly podiatry visits  No transportation issues  Nutritional intake adequate, he follows a diabetic diet  He uses a dexacom CGM, FBS today was 106  No s/s of hypoglycemia  He is taking all medications as prescribed  Follow up appointments scheduled  He continues with burning on urination, is forcing fluids and has urology f/u scheduled  No other issues or concerns at this time

## 2021-02-25 ENCOUNTER — OFFICE VISIT (OUTPATIENT)
Dept: VASCULAR SURGERY | Facility: CLINIC | Age: 76
End: 2021-02-25
Payer: MEDICARE

## 2021-02-25 VITALS
TEMPERATURE: 98.4 F | DIASTOLIC BLOOD PRESSURE: 74 MMHG | WEIGHT: 234.4 LBS | BODY MASS INDEX: 28.54 KG/M2 | HEIGHT: 76 IN | SYSTOLIC BLOOD PRESSURE: 118 MMHG | HEART RATE: 95 BPM

## 2021-02-25 DIAGNOSIS — G62.9 NEUROPATHY: ICD-10-CM

## 2021-02-25 DIAGNOSIS — L97.426 DIABETIC ULCER OF LEFT MIDFOOT ASSOCIATED WITH TYPE 2 DIABETES MELLITUS, WITH BONE INVOLVEMENT WITHOUT EVIDENCE OF NECROSIS (HCC): ICD-10-CM

## 2021-02-25 DIAGNOSIS — I73.9 PAD (PERIPHERAL ARTERY DISEASE) (HCC): Primary | ICD-10-CM

## 2021-02-25 DIAGNOSIS — E11.621 DIABETIC ULCER OF LEFT MIDFOOT ASSOCIATED WITH TYPE 2 DIABETES MELLITUS, WITH BONE INVOLVEMENT WITHOUT EVIDENCE OF NECROSIS (HCC): ICD-10-CM

## 2021-02-25 PROCEDURE — 99213 OFFICE O/P EST LOW 20 MIN: CPT | Performed by: SURGERY

## 2021-02-25 RX ORDER — GABAPENTIN 300 MG/1
300 CAPSULE ORAL
Qty: 90 CAPSULE | Refills: 3 | Status: SHIPPED | OUTPATIENT
Start: 2021-02-25 | End: 2021-07-23 | Stop reason: SDUPTHER

## 2021-02-25 NOTE — PROGRESS NOTES
Assessment/Plan:    PAD (peripheral artery disease) ([facility])  PAD with left foot tissue loss and nonhealing 5th ray amputation site now s/p left lower extremity angiogram with PT angioplasty via contralateral access followed by left lower extremity angiogram via antegrade access with [facility] atherectomy and PTA of the PT artery  Post-intervention arterial duplex revealed adequate healing pressures in the foot of left [id number]  He subsequently underwent left foot debridement with graft application  He saw podiatry yesterday and was noted to have adequate healing in the foot  He is reporting some reperfusion swelling and intermittent shooting pains/neuropathy which is worse at night  He has baseline neuropathy but it is worse than normal     -We discussed the pathophysiology of arterial occlusive disease, importance of continued surveillance and close observation  Will obtain repeat lower extremity arterial duplex in 3 months   -Continue medical optimization  Currently on ASA  Intolerant of statins  Goal hgb A1c<7 0, LDL<70, BP <130/80  -Will increase gabapentin dosing for peripheral neuropathy symptoms  He is taking 600mg once daily in the morning  Advised to add 300mg at night to help with worsening neuropathy in the evening   -Offloading, wearing diabetic shoe on the left  Weight bearing recommendations per podiatry  Has weekly follow-up with podiatry for wound care  -Follow-up in 1 month for wound recheck to ensure adequate healing  Diagnoses and all orders for this visit:    PAD (peripheral artery disease) ([facility])  -     VAS lower limb arterial duplex, limited/unilateral; Future    Neuropathy  -     gabapentin (NEURONTIN) 300 mg capsule;  Take 1 capsule (300 mg total) by mouth daily at bedtime    Diabetic ulcer of left midfoot associated with type 2 diabetes mellitus, 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 ([facility]        I have spent 15 minutes with Patient  today in which greater than 50% of this time was spent in counseling/coordination of care regarding Intructions for management, Importance of tx compliance and Impressions  Subjective:      Patient ID: Nader James is a 64 y o  male  Pt is here today to review results of Angiograms done 2/8/2021 LLE angiogram with LLE runoff and balloon angioplasty by Dr Paulino Horan and 2/11/2021 LLE angiogram with balloon angioplasty done by Dr Katie Olivia in [place]  Pt is s/p Ray resection done 12/21/2020 by podiatry  Pt c/o a vibration feeling all of the time in his left foot, then occasional he gets a sharp, stabbing pain in the left foot  The pain is worse at night  Pt is taking ASA   He is a former smoker  HPI  Mr Patrick Bose is a 66yo male with HTN, DMII, CKD, CAD, bladder cancer, left diabetic foot wound s/p left 5th metatarsal resection and revision for osteomyelitis with poor healing wound, now s/p left leg agram with PT PTA and reintervention with antegrade access, [place] atherectomy and PT PTA, followed by left foot debridement and graft placement  He is overall doing well now  He saw podiatry yesterday who he states noted adequate healing of his foot  He has weekly follow-up with podiatry  He did report post-intervention reperfusion swelling which is gradually improving  He has also had spasm and intermittent neuropathy in his calf radiating down to his foot since the intervention  He has baseline peripheral neuropathy but his symptoms are worse, particularly at night  He takes gabapentin but only in the morning because he has reflux symptoms associated with it at nighttime  He had hematuria while he was in the hospital but that has resolved  The following portions of the patient's history were reviewed and updated as appropriate: allergies, current medications, past family history, past medical history, past social history, past surgical history and problem list     Review of Systems   Constitutional: Negative  HENT: Negative      Eyes: Positive for pain    Respiratory: Negative  Cardiovascular: Positive for leg swelling (left leg)  Gastrointestinal: Negative  Endocrine: Negative  Genitourinary: Positive for dysuria  Musculoskeletal: Positive for arthralgias, back pain, myalgias and neck pain  Skin: Positive for wound  Allergic/Immunologic: Negative  Neurological: Positive for numbness  Hematological: Negative  Psychiatric/Behavioral: The patient is nervous/anxious  Depression       I have personally reviewed the ROS entered by MA and agree as documented  Objective:      /74 (BP Location: Left arm, Patient Position: Sitting, Cuff Size: Standard)   Pulse 95   Temp 98 4 °F (36 9 °C) (Tympanic)   Ht 6' 4" (1 93 m)   Wt 106 kg (234 lb 6 4 oz)   BMI 28 53 kg/m²          Physical Exam  Vitals signs and nursing note reviewed  Constitutional:       Appearance: He is well-developed  HENT:      Head: Normocephalic and atraumatic  Neck:      Musculoskeletal: Normal range of motion and neck supple  Cardiovascular:      Rate and Rhythm: Normal rate and regular rhythm  Pulses:           Dorsalis pedis pulses are detected w/ Doppler on the left side  Posterior tibial pulses are detected w/ Doppler on the left side  Comments: Multiphasic left PT/DP signal  Pulmonary:      Effort: Pulmonary effort is normal    Abdominal:      Palpations: Abdomen is soft  Musculoskeletal: Normal range of motion  Left lower leg: Edema present  Skin:     General: Skin is warm and dry  Capillary Refill: Capillary refill takes less than 2 seconds  Comments: Left foot wound just dressed yesterday by podiatry   Neurological:      General: No focal deficit present  Mental Status: He is alert and oriented to person, place, and time  Psychiatric:         Mood and Affect: Mood normal          Behavior: Behavior normal          Thought Content:  Thought content normal          Judgment: Judgment normal

## 2021-02-25 NOTE — LETTER
February 25, 2021     James Raman MD  [address]    Patient: Lana Ramos   YOB: 1945   Date of Visit: 2/25/2021       Dear Dr Iam Young: Thank you for referring Bernie Pastrana to me for evaluation  Below are the relevant portions of my assessment and plan of care  Diagnoses and all orders for this visit:       PAD (peripheral artery disease) ([address] )  PAD with left foot tissue loss and nonhealing 5th ray amputation site now s/p left lower extremity angiogram with PT angioplasty via contralateral access followed by left lower extremity angiogram via antegrade access with [facility] atherectomy and PTA of the PT artery  Post-intervention arterial duplex revealed adequate healing pressures in the foot of left [id number]  He subsequently underwent left foot debridement with graft application  He saw podiatry yesterday and was noted to have adequate healing in the foot  He is reporting some reperfusion swelling and intermittent shooting pains/neuropathy which is worse at night  He has baseline neuropathy but it is worse than normal      -We discussed the pathophysiology of arterial occlusive disease, importance of continued surveillance and close observation  Will obtain repeat lower extremity arterial duplex in 3 months   -Continue medical optimization  Currently on ASA  Intolerant of statins  Goal hgb A1c<7 0, LDL<70, BP <130/80  -Will increase gabapentin dosing for peripheral neuropathy symptoms  He is taking 600mg once daily in the morning  Advised to add 300mg at night to help with worsening neuropathy in the evening   -Offloading, wearing diabetic shoe on the left  Weight bearing recommendations per podiatry  Has weekly follow-up with podiatry for wound care  -Follow-up in 1 month for wound recheck to ensure adequate healing          If you have questions, please do not hesitate to call me  I look forward to following Dilia Li along with you  Sincerely,        Flora Franco MD        CC: No Recipients

## 2021-02-25 NOTE — PATIENT INSTRUCTIONS
PAD (peripheral artery disease) ([facility])  PAD with left foot tissue loss and nonhealing 5th ray amputation site now s/p left lower extremity angiogram with PT angioplasty via contralateral access followed by left lower extremity angiogram via antegrade access with [facility] atherectomy and PTA of the PT artery  Post-intervention arterial duplex revealed adequate healing pressures in the foot of left [id number]  He subsequently underwent left foot debridement with graft application  He saw podiatry yesterday and was noted to have adequate healing in the foot  He is reporting some reperfusion swelling and intermittent shooting pains/neuropathy which is worse at night  He has baseline neuropathy but it is worse than normal      -We discussed the pathophysiology of arterial occlusive disease, importance of continued surveillance and close observation  Will obtain repeat lower extremity arterial duplex in 3 months   -Continue medical optimization  Currently on ASA  Intolerant of statins  Goal hgb A1c<7 0, LDL<70, BP <130/80  -Will increase gabapentin dosing for peripheral neuropathy symptoms  He is taking 600mg once daily in the morning  Advised to add 300mg at night to help with worsening neuropathy in the evening   -Offloading, wearing diabetic shoe on the left  Weight bearing recommendations per podiatry  Has weekly follow-up with podiatry for wound care    -Follow-up in 1 month for wound recheck to ensure adequate healing

## 2021-02-25 NOTE — ASSESSMENT & PLAN NOTE
PAD with left foot tissue loss and nonhealing 5th ray amputation site now s/p left lower extremity angiogram with PT angioplasty via contralateral access followed by left lower extremity angiogram via antegrade access with [facility] atherectomy and PTA of the PT artery  Post-intervention arterial duplex revealed adequate healing pressures in the foot of left [id number]  He subsequently underwent left foot debridement with graft application  He saw podiatry yesterday and was noted to have adequate healing in the foot  He is reporting some reperfusion swelling and intermittent shooting pains/neuropathy which is worse at night  He has baseline neuropathy but it is worse than normal     -We discussed the pathophysiology of arterial occlusive disease, importance of continued surveillance and close observation  Will obtain repeat lower extremity arterial duplex in 3 months   -Continue medical optimization  Currently on ASA  Intolerant of statins  Goal hgb A1c<7 0, LDL<70, BP <130/80  -Will increase gabapentin dosing for peripheral neuropathy symptoms  He is taking 600mg once daily in the morning  Advised to add 300mg at night to help with worsening neuropathy in the evening   -Offloading, wearing diabetic shoe on the left  Weight bearing recommendations per podiatry  Has weekly follow-up with podiatry for wound care  -Follow-up in 1 month for wound recheck to ensure adequate healing

## 2021-02-26 ENCOUNTER — IMMUNIZATIONS (OUTPATIENT)
Dept: FAMILY MEDICINE CLINIC | Facility: HOSPITAL | Age: 76
End: 2021-02-26

## 2021-02-26 DIAGNOSIS — Z23 ENCOUNTER FOR IMMUNIZATION: Primary | ICD-10-CM

## 2021-02-26 PROCEDURE — 0001A SARS-COV-2 / COVID-19 MRNA VACCINE (PFIZER-BIONTECH) 30 MCG: CPT

## 2021-02-26 PROCEDURE — 91300 SARS-COV-2 / COVID-19 MRNA VACCINE (PFIZER-BIONTECH) 30 MCG: CPT

## 2021-03-02 ENCOUNTER — OFFICE VISIT (OUTPATIENT)
Dept: INTERNAL MEDICINE CLINIC | Facility: CLINIC | Age: 76
End: 2021-03-02
Payer: MEDICARE

## 2021-03-02 VITALS
SYSTOLIC BLOOD PRESSURE: 148 MMHG | DIASTOLIC BLOOD PRESSURE: 90 MMHG | TEMPERATURE: 97.8 F | HEART RATE: 86 BPM | RESPIRATION RATE: 12 BRPM

## 2021-03-02 DIAGNOSIS — I45.9 STOKES-ADAMS SYNCOPE: ICD-10-CM

## 2021-03-02 DIAGNOSIS — L97.426 DIABETIC ULCER OF LEFT MIDFOOT ASSOCIATED WITH TYPE 2 DIABETES MELLITUS, WITH BONE INVOLVEMENT WITHOUT EVIDENCE OF NECROSIS (HCC): ICD-10-CM

## 2021-03-02 DIAGNOSIS — Z79.4 TYPE 2 DIABETES MELLITUS WITH STAGE 3 CHRONIC KIDNEY DISEASE, WITH LONG-TERM CURRENT USE OF INSULIN, UNSPECIFIED WHETHER STAGE 3A OR 3B CKD (HCC): Primary | ICD-10-CM

## 2021-03-02 DIAGNOSIS — D69.6 THROMBOCYTOPENIA (HCC): ICD-10-CM

## 2021-03-02 DIAGNOSIS — I12.9 HYPERTENSION WITH RENAL DISEASE: ICD-10-CM

## 2021-03-02 DIAGNOSIS — D50.0 IRON DEFICIENCY ANEMIA DUE TO CHRONIC BLOOD LOSS: ICD-10-CM

## 2021-03-02 DIAGNOSIS — N18.30 TYPE 2 DIABETES MELLITUS WITH STAGE 3 CHRONIC KIDNEY DISEASE, WITH LONG-TERM CURRENT USE OF INSULIN, UNSPECIFIED WHETHER STAGE 3A OR 3B CKD (HCC): Primary | ICD-10-CM

## 2021-03-02 DIAGNOSIS — N25.81 SECONDARY RENAL HYPERPARATHYROIDISM (HCC): ICD-10-CM

## 2021-03-02 DIAGNOSIS — E11.22 TYPE 2 DIABETES MELLITUS WITH STAGE 3 CHRONIC KIDNEY DISEASE, WITH LONG-TERM CURRENT USE OF INSULIN, UNSPECIFIED WHETHER STAGE 3A OR 3B CKD (HCC): Primary | ICD-10-CM

## 2021-03-02 DIAGNOSIS — E78.2 MIXED HYPERLIPIDEMIA: ICD-10-CM

## 2021-03-02 DIAGNOSIS — N18.4 CKD (CHRONIC KIDNEY DISEASE) STAGE 4, GFR 15-29 ML/MIN (HCC): ICD-10-CM

## 2021-03-02 DIAGNOSIS — C67.9 MALIGNANT NEOPLASM OF URINARY BLADDER, UNSPECIFIED SITE (HCC): ICD-10-CM

## 2021-03-02 DIAGNOSIS — F32.1 MODERATE MAJOR DEPRESSION, SINGLE EPISODE (HCC): ICD-10-CM

## 2021-03-02 DIAGNOSIS — R55 SYNCOPE, UNSPECIFIED SYNCOPE TYPE: ICD-10-CM

## 2021-03-02 DIAGNOSIS — J42 CHRONIC BRONCHITIS, UNSPECIFIED CHRONIC BRONCHITIS TYPE (HCC): ICD-10-CM

## 2021-03-02 DIAGNOSIS — E11.621 DIABETIC ULCER OF LEFT MIDFOOT ASSOCIATED WITH TYPE 2 DIABETES MELLITUS, WITH BONE INVOLVEMENT WITHOUT EVIDENCE OF NECROSIS (HCC): ICD-10-CM

## 2021-03-02 DIAGNOSIS — I25.10 CORONARY ARTERY DISEASE INVOLVING NATIVE CORONARY ARTERY OF NATIVE HEART WITHOUT ANGINA PECTORIS: ICD-10-CM

## 2021-03-02 PROCEDURE — 93000 ELECTROCARDIOGRAM COMPLETE: CPT | Performed by: INTERNAL MEDICINE

## 2021-03-02 PROCEDURE — 99495 TRANSJ CARE MGMT MOD F2F 14D: CPT | Performed by: INTERNAL MEDICINE

## 2021-03-02 RX ORDER — FERROUS SULFATE 325(65) MG
65 TABLET ORAL DAILY
Qty: 30 EACH | Refills: 1 | Status: SHIPPED | OUTPATIENT
Start: 2021-03-02 | End: 2021-10-12

## 2021-03-02 NOTE — PROGRESS NOTES
Assessment/Plan: see Cardiology Holter monitor start iron back in 4 weeks sooner if needed if you have recurrence syncope go to the emergency room    EKG sinus rhythm rate of 90 poor R-wave progression in the anterior leads V1 to V3 no acute changes 1 PVC compared to the EKG of 2020 in August February 4th this year the hemoglobin A1c is 7 good diabetic control     problem 1  Syncope had a syncopal episode 2 days ago he has had 5 episodes in the last year  He stop the metoprolol succinate I told him to go back on all the medications he should have an event recorder putting by his cardiologist will do an EKG today all order a Holter monitor for 48 hours denies any chest pain palpitation shortness of breath the last episode happened when he was sitting in the dining room table he was not eating talking to his grandchildren  And he passed out the eyes rolled up he was not shaking occur for few minutes the did a blood sugars with was 200 the give him something to drink when he woke up and the granddaughter suggested to stop the metoprolol his blood pressure is elevated here now heart rate of 90 he denies any angina I told him to resume the metoprolol he feels dizzy he can cut it in half he should see the cardiologist sin will do an EKG today I will order a Holter monitor he appears in no distress     reviewing the labs he is anemic iron deficiency anemia was started him on iron 1 tablet daily    Will check a CBC when he comes back    Results for orders placed or performed during the hospital encounter of 02/07/21   Urine culture    Specimen: Urine, Clean Catch   Result Value Ref Range    Urine Culture <10,000 cfu/ml Staphylococcus coagulase negative (A)    Urine culture    Specimen: Urine, Indwelling Shirley Catheter   Result Value Ref Range    Urine Culture No Growth <1000 cfu/mL    Platelet count   Result Value Ref Range    Platelets 614 316 - 375 Thousands/uL    MPV 10 7 8 9 - 12 7 fL   Basic metabolic panel Result Value Ref Range    Sodium 139 136 - 145 mmol/L    Potassium 4 5 3 5 - 5 3 mmol/L    Chloride 106 100 - 108 mmol/L    CO2 24 21 - 32 mmol/L    ANION GAP 9 4 - 13 mmol/L    BUN 38 (H) 5 - 25 mg/dL    Creatinine 2 03 (H) 0 60 - 1 30 mg/dL    Glucose 104 65 - 140 mg/dL    Glucose, Fasting 104 (H) 65 - 99 mg/dL    Calcium 8 3 8 3 - 10 1 mg/dL    eGFR 35 ml/min/1 73sq m   CBC   Result Value Ref Range    WBC 6 74 4 31 - 10 16 Thousand/uL    RBC 3 39 (L) 3 88 - 5 62 Million/uL    Hemoglobin 10 4 (L) 12 0 - 17 0 g/dL    Hematocrit 31 4 (L) 36 5 - 49 3 %    MCV 93 82 - 98 fL    MCH 30 7 26 8 - 34 3 pg    MCHC 33 1 31 4 - 37 4 g/dL    RDW 13 2 11 6 - 15 1 %    Platelets 259 500 - 359 Thousands/uL    MPV 10 7 8 9 - 12 7 fL   Protime-INR   Result Value Ref Range    Protime 13 6 11 6 - 14 5 seconds    INR 1 06 0 84 - 1 19   Hemoglobin and hematocrit, blood   Result Value Ref Range    Hemoglobin 9 2 (L) 12 0 - 17 0 g/dL    Hematocrit 28 0 (L) 36 5 - 49 3 %   Basic metabolic panel   Result Value Ref Range    Sodium 138 136 - 145 mmol/L    Potassium 5 4 (H) 3 5 - 5 3 mmol/L    Chloride 106 100 - 108 mmol/L    CO2 26 21 - 32 mmol/L    ANION GAP 6 4 - 13 mmol/L    BUN 35 (H) 5 - 25 mg/dL    Creatinine 2 02 (H) 0 60 - 1 30 mg/dL    Glucose 251 (H) 65 - 140 mg/dL    Calcium 7 9 (L) 8 3 - 10 1 mg/dL    eGFR 36 ml/min/1 73sq m   Basic metabolic panel   Result Value Ref Range    Sodium 138 136 - 145 mmol/L    Potassium 4 5 3 5 - 5 3 mmol/L    Chloride 106 100 - 108 mmol/L    CO2 23 21 - 32 mmol/L    ANION GAP 9 4 - 13 mmol/L    BUN 33 (H) 5 - 25 mg/dL    Creatinine 1 96 (H) 0 60 - 1 30 mg/dL    Glucose 146 (H) 65 - 140 mg/dL    Calcium 7 9 (L) 8 3 - 10 1 mg/dL    eGFR 37 ml/min/1 73sq m   CBC and Platelet   Result Value Ref Range    WBC 7 78 4 31 - 10 16 Thousand/uL    RBC 3 03 (L) 3 88 - 5 62 Million/uL    Hemoglobin 9 5 (L) 12 0 - 17 0 g/dL    Hematocrit 28 4 (L) 36 5 - 49 3 %    MCV 94 82 - 98 fL    MCH 31 4 26 8 - 34 3 pg MCHC 33 5 31 4 - 37 4 g/dL    RDW 13 2 11 6 - 15 1 %    Platelets 162 798 - 400 Thousands/uL    MPV 10 1 8 9 - 12 7 fL   Urinalysis with microscopic   Result Value Ref Range    Clarity, UA Cloudy     Color, UA Red     Specific [place], UA 1 015 1 003 - 1 030    pH, UA 6 5 4 5, 5 0, 5 5, 6 0, 6 5, 7 0, 7 5, 8 0    Glucose,  (1/10%) (A) Negative mg/dl    Ketones, UA 15 (1+) (A) Negative mg/dl    Blood, UA Large (A) Negative    Protein, UA >=300 (A) Negative mg/dl    Nitrite, UA Positive (A) Negative    Bilirubin, UA Interference- unable to analyze (A) Negative    Urobilinogen, UA 4 0 (A) 0 2, 1 0 E U /dl E U /dl    Leukocytes, UA Moderate (A) Negative    WBC, UA Field obscured, unable to enumerate (A) None Seen, 2-4 /hpf    RBC, UA Innumerable (A) None Seen, 2-4 /hpf    Bacteria, UA Field obscured, unable to enumerate (A) None Seen, Occasional /hpf    Epithelial Cells Field obscured, unable to enumerate (A) None Seen, Occasional /hpf   Basic metabolic panel   Result Value Ref Range    Sodium 139 136 - 145 mmol/L    Potassium 4 3 3 5 - 5 3 mmol/L    Chloride 107 100 - 108 mmol/L    CO2 23 21 - 32 mmol/L    ANION GAP 9 4 - 13 mmol/L    BUN 32 (H) 5 - 25 mg/dL    Creatinine 2 05 (H) 0 60 - 1 30 mg/dL    Glucose 113 65 - 140 mg/dL    Calcium 8 4 8 3 - 10 1 mg/dL    eGFR 35 ml/min/1 73sq m   CBC   Result Value Ref Range    WBC 7 24 4 31 - 10 16 Thousand/uL    RBC 2 95 (L) 3 88 - 5 62 Million/uL    Hemoglobin 9 1 (L) 12 0 - 17 0 g/dL    Hematocrit 27 3 (L) 36 5 - 49 3 %    MCV 93 82 - 98 fL    MCH 30 8 26 8 - 34 3 pg    MCHC 33 3 31 4 - 37 4 g/dL    RDW 13 2 11 6 - 15 1 %    Platelets 080 758 - 352 Thousands/uL    MPV 10 6 8 9 - 12 7 fL   UA w Reflex to Microscopic w Reflex to Culture    Specimen: Urine, Indwelling Shirley Catheter   Result Value Ref Range    Color, UA Yellow     Clarity, UA Slightly Cloudy     Specific [place], UA 1 015 1 003 - 1 030    pH, UA 6 0 4 5, 5 0, 5 5, 6 0, 6 5, 7 0, 7 5, 8 0    Leukocytes, UA Moderate (A) Negative    Nitrite, UA Negative Negative    Protein,  (2+) (A) Negative mg/dl    Glucose, UA Negative Negative mg/dl    Ketones, UA Negative Negative mg/dl    Urobilinogen, UA 0 2 0 2, 1 0 E U /dl E U /dl    Bilirubin, UA Negative Negative    Blood, UA Large (A) Negative   Basic metabolic panel   Result Value Ref Range    Sodium 136 136 - 145 mmol/L    Potassium 4 6 3 5 - 5 3 mmol/L    Chloride 104 100 - 108 mmol/L    CO2 23 21 - 32 mmol/L    ANION GAP 9 4 - 13 mmol/L    BUN 30 (H) 5 - 25 mg/dL    Creatinine 2 14 (H) 0 60 - 1 30 mg/dL    Glucose 118 65 - 140 mg/dL    Calcium 8 4 8 3 - 10 1 mg/dL    eGFR 33 ml/min/1 73sq m   CBC and differential   Result Value Ref Range    WBC 9 84 4 31 - 10 16 Thousand/uL    RBC 3 15 (L) 3 88 - 5 62 Million/uL    Hemoglobin 9 7 (L) 12 0 - 17 0 g/dL    Hematocrit 29 2 (L) 36 5 - 49 3 %    MCV 93 82 - 98 fL    MCH 30 8 26 8 - 34 3 pg    MCHC 33 2 31 4 - 37 4 g/dL    RDW 13 5 11 6 - 15 1 %    MPV 10 7 8 9 - 12 7 fL    Platelets 975 208 - 973 Thousands/uL    nRBC 0 /100 WBCs    Neutrophils Relative 69 43 - 75 %    Immat GRANS % 0 0 - 2 %    Lymphocytes Relative 21 14 - 44 %    Monocytes Relative 7 4 - 12 %    Eosinophils Relative 3 0 - 6 %    Basophils Relative 0 0 - 1 %    Neutrophils Absolute 6 68 1 85 - 7 62 Thousands/µL    Immature Grans Absolute 0 04 0 00 - 0 20 Thousand/uL    Lymphocytes Absolute 2 09 0 60 - 4 47 Thousands/µL    Monocytes Absolute 0 73 0 17 - 1 22 Thousand/µL    Eosinophils Absolute 0 26 0 00 - 0 61 Thousand/µL    Basophils Absolute 0 04 0 00 - 0 10 Thousands/µL   Magnesium   Result Value Ref Range    Magnesium 1 9 1 6 - 2 6 mg/dL   Phosphorus   Result Value Ref Range    Phosphorus 3 6 2 3 - 4 1 mg/dL   Urine Microscopic   Result Value Ref Range    RBC, UA Innumerable (A) None Seen, 2-4 /hpf    WBC, UA Innumerable (A) None Seen, 2-4 /hpf    Epithelial Cells Occasional None Seen, Occasional /hpf    Bacteria, UA Occasional None Seen, Occasional /hpf   Basic metabolic panel   Result Value Ref Range    Sodium 137 136 - 145 mmol/L    Potassium 4 5 3 5 - 5 3 mmol/L    Chloride 104 100 - 108 mmol/L    CO2 26 21 - 32 mmol/L    ANION GAP 7 4 - 13 mmol/L    BUN 29 (H) 5 - 25 mg/dL    Creatinine 2 03 (H) 0 60 - 1 30 mg/dL    Glucose 102 65 - 140 mg/dL    Calcium 8 4 8 3 - 10 1 mg/dL    eGFR 35 ml/min/1 73sq m   Magnesium   Result Value Ref Range    Magnesium 1 9 1 6 - 2 6 mg/dL   Phosphorus   Result Value Ref Range    Phosphorus 3 7 2 3 - 4 1 mg/dL   Basic metabolic panel   Result Value Ref Range    Sodium 138 136 - 145 mmol/L    Potassium 4 3 3 5 - 5 3 mmol/L    Chloride 103 100 - 108 mmol/L    CO2 26 21 - 32 mmol/L    ANION GAP 9 4 - 13 mmol/L    BUN 30 (H) 5 - 25 mg/dL    Creatinine 2 04 (H) 0 60 - 1 30 mg/dL    Glucose 99 65 - 140 mg/dL    Calcium 8 7 8 3 - 10 1 mg/dL    eGFR 35 ml/min/1 73sq m   Prealbumin   Result Value Ref Range    Prealbumin 20 3 18 0 - 40 0 mg/dL   CBC and Platelet   Result Value Ref Range    WBC 7 37 4 31 - 10 16 Thousand/uL    RBC 3 03 (L) 3 88 - 5 62 Million/uL    Hemoglobin 9 4 (L) 12 0 - 17 0 g/dL    Hematocrit 28 5 (L) 36 5 - 49 3 %    MCV 94 82 - 98 fL    MCH 31 0 26 8 - 34 3 pg    MCHC 33 0 31 4 - 37 4 g/dL    RDW 13 3 11 6 - 15 1 %    Platelets 645 253 - 095 Thousands/uL    MPV 10 6 8 9 - 12 7 fL   Basic metabolic panel   Result Value Ref Range    Sodium 138 136 - 145 mmol/L    Potassium 4 2 3 5 - 5 3 mmol/L    Chloride 104 100 - 108 mmol/L    CO2 25 21 - 32 mmol/L    ANION GAP 9 4 - 13 mmol/L    BUN 31 (H) 5 - 25 mg/dL    Creatinine 2 02 (H) 0 60 - 1 30 mg/dL    Glucose 88 65 - 140 mg/dL    Calcium 8 7 8 3 - 10 1 mg/dL    eGFR 36 ml/min/1 73sq m   CBC and Platelet   Result Value Ref Range    WBC 7 97 4 31 - 10 16 Thousand/uL    RBC 2 97 (L) 3 88 - 5 62 Million/uL    Hemoglobin 9 1 (L) 12 0 - 17 0 g/dL    Hematocrit 28 0 (L) 36 5 - 49 3 %    MCV 94 82 - 98 fL    MCH 30 6 26 8 - 34 3 pg    MCHC 32 5 31 4 - 37 4 g/dL RDW 13 3 11 6 - 15 1 %    Platelets 015 007 - 217 Thousands/uL    MPV 9 9 8 9 - 12 7 fL   Basic metabolic panel   Result Value Ref Range    Sodium 138 136 - 145 mmol/L    Potassium 4 2 3 5 - 5 3 mmol/L    Chloride 104 100 - 108 mmol/L    CO2 26 21 - 32 mmol/L    ANION GAP 8 4 - 13 mmol/L    BUN 34 (H) 5 - 25 mg/dL    Creatinine 2 06 (H) 0 60 - 1 30 mg/dL    Glucose 108 65 - 140 mg/dL    Calcium 9 1 8 3 - 10 1 mg/dL    eGFR 35 ml/min/1 73sq m   CBC and Platelet   Result Value Ref Range    WBC 7 56 4 31 - 10 16 Thousand/uL    RBC 3 03 (L) 3 88 - 5 62 Million/uL    Hemoglobin 9 2 (L) 12 0 - 17 0 g/dL    Hematocrit 28 5 (L) 36 5 - 49 3 %    MCV 94 82 - 98 fL    MCH 30 4 26 8 - 34 3 pg    MCHC 32 3 31 4 - 37 4 g/dL    RDW 13 3 11 6 - 15 1 %    Platelets 009 824 - 388 Thousands/uL    MPV 9 8 8 9 - 12 7 fL   Basic metabolic panel   Result Value Ref Range    Sodium 138 136 - 145 mmol/L    Potassium 4 7 3 5 - 5 3 mmol/L    Chloride 104 100 - 108 mmol/L    CO2 26 21 - 32 mmol/L    ANION GAP 8 4 - 13 mmol/L    BUN 33 (H) 5 - 25 mg/dL    Creatinine 2 08 (H) 0 60 - 1 30 mg/dL    Glucose 108 65 - 140 mg/dL    Calcium 8 5 8 3 - 10 1 mg/dL    eGFR 34 ml/min/1 73sq m   Iron Saturation %   Result Value Ref Range    Iron Saturation 18 %    TIBC 285 250 - 450 ug/dL    Iron 50 (L) 65 - 175 ug/dL   Ferritin   Result Value Ref Range    Ferritin 23 8 - 388 ng/mL   Fingerstick Glucose (POCT)   Result Value Ref Range    POC Glucose 94 65 - 140 mg/dl   Fingerstick Glucose (POCT)   Result Value Ref Range    POC Glucose 154 (H) 65 - 140 mg/dl   Fingerstick Glucose (POCT)   Result Value Ref Range    POC Glucose 104 65 - 140 mg/dl   Fingerstick Glucose (POCT)   Result Value Ref Range    POC Glucose 105 65 - 140 mg/dl   Fingerstick Glucose (POCT)   Result Value Ref Range    POC Glucose 248 (H) 65 - 140 mg/dl   Fingerstick Glucose (POCT)   Result Value Ref Range    POC Glucose 106 65 - 140 mg/dl   Fingerstick Glucose (POCT)   Result Value Ref Range POC Glucose 182 (H) 65 - 140 mg/dl   Fingerstick Glucose (POCT)   Result Value Ref Range    POC Glucose 127 65 - 140 mg/dl   Fingerstick Glucose (POCT)   Result Value Ref Range    POC Glucose 145 (H) 65 - 140 mg/dl   Fingerstick Glucose (POCT)   Result Value Ref Range    POC Glucose 96 65 - 140 mg/dl   Fingerstick Glucose (POCT)   Result Value Ref Range    POC Glucose 106 65 - 140 mg/dl   Fingerstick Glucose (POCT)   Result Value Ref Range    POC Glucose 303 (H) 65 - 140 mg/dl   Fingerstick Glucose (POCT)   Result Value Ref Range    POC Glucose 163 (H) 65 - 140 mg/dl   Fingerstick Glucose (POCT)   Result Value Ref Range    POC Glucose 113 65 - 140 mg/dl   Fingerstick Glucose (POCT)   Result Value Ref Range    POC Glucose 89 65 - 140 mg/dl   Fingerstick Glucose (POCT)   Result Value Ref Range    POC Glucose 88 65 - 140 mg/dl   Fingerstick Glucose (POCT)   Result Value Ref Range    POC Glucose 99 65 - 140 mg/dl   Fingerstick Glucose (POCT)   Result Value Ref Range    POC Glucose 125 65 - 140 mg/dl   Fingerstick Glucose (POCT)   Result Value Ref Range    POC Glucose 121 65 - 140 mg/dl   Fingerstick Glucose (POCT)   Result Value Ref Range    POC Glucose 116 65 - 140 mg/dl   Fingerstick Glucose (POCT)   Result Value Ref Range    POC Glucose 107 65 - 140 mg/dl   Fingerstick Glucose (POCT)   Result Value Ref Range    POC Glucose 142 (H) 65 - 140 mg/dl   Fingerstick Glucose (POCT)   Result Value Ref Range    POC Glucose 128 65 - 140 mg/dl   Fingerstick Glucose (POCT)   Result Value Ref Range    POC Glucose 188 (H) 65 - 140 mg/dl   Fingerstick Glucose (POCT)   Result Value Ref Range    POC Glucose 152 (H) 65 - 140 mg/dl   Fingerstick Glucose (POCT)   Result Value Ref Range    POC Glucose 107 65 - 140 mg/dl   Fingerstick Glucose (POCT)   Result Value Ref Range    POC Glucose 204 (H) 65 - 140 mg/dl   Fingerstick Glucose (POCT)   Result Value Ref Range    POC Glucose 149 (H) 65 - 140 mg/dl   Fingerstick Glucose (POCT) Result Value Ref Range    POC Glucose 161 (H) 65 - 140 mg/dl   Fingerstick Glucose (POCT)   Result Value Ref Range    POC Glucose 104 65 - 140 mg/dl   Fingerstick Glucose (POCT)   Result Value Ref Range    POC Glucose 131 65 - 140 mg/dl   Fingerstick Glucose (POCT)   Result Value Ref Range    POC Glucose 93 65 - 140 mg/dl   Fingerstick Glucose (POCT)   Result Value Ref Range    POC Glucose 100 65 - 140 mg/dl   Fingerstick Glucose (POCT)   Result Value Ref Range    POC Glucose 239 (H) 65 - 140 mg/dl   Fingerstick Glucose (POCT)   Result Value Ref Range    POC Glucose 170 (H) 65 - 140 mg/dl   Fingerstick Glucose (POCT)   Result Value Ref Range    POC Glucose 98 65 - 140 mg/dl   Fingerstick Glucose (POCT)   Result Value Ref Range    POC Glucose 109 65 - 140 mg/dl         He was admitted to the hospital for peripheral vascular disease had left leg angiogram left foot debridement he has some hematuria while he was in the hospital on the went cystoscopy with cauterization of the urethra hematuria resolved Shirley was removed he had an angioplasty with good results is none wet weight-bearing of the left lower extremities is following up with vascular Podiatry Nephrology Internal Medicine me as an outpatient    Diabetes had no episodes of hypoglycemia    Chronic renal failure appears to be stable stage IV     Peripheral vascular disease as above     History of bladder cancer with gross hematuria follow-up with Urology hematuria has resolved    TCM Call (since 1/30/2021)     Date and time call was made  2/18/2021  9:45 AM    Hospital care reviewed  Records reviewed    Date of Admission  02/07/21    Date of discharge  02/17/21    Diagnosis  PVD    Disposition  Home    Were the patients medications reviewed and updated  No    Current Symptoms  None      TCM Call (since 1/30/2021)     Post hospital issues  None    Scheduled for follow up?   Yes    Did you obtain your prescribed medications  Yes    Do you need help managing your prescriptions or medications  No    Is transportation to your appointment needed  No    I have advised the patient to call PCP with any new or worsening symptoms  Rosio Garcia cma    Living Arrangements  Spouse or Significiant other    Are you recieving any outpatient services  No    Are you recieving home care services  No    Are you using any community resources  No    Current waiver services  No    Have you fallen in the last 12 months  Yes    Interperter language line needed  No    Counseling  Patient          No problem-specific Assessment & Plan notes found for this encounter  Diagnoses and all orders for this visit:    Type 2 diabetes mellitus with stage 3 chronic kidney disease, with long-term current use of insulin, unspecified whether stage 3a or 3b CKD (HCC)  -     CBC and differential; Future  -     Comprehensive metabolic panel; Future  -     TSH, 3rd generation with Free T4 reflex; Future  -     Ferritin; Future  -     POCT ECG    Diabetic ulcer of left midfoot associated with type 2 diabetes mellitus, 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)  -     CBC and differential; Future  -     Comprehensive metabolic panel; Future  -     TSH, 3rd generation with Free T4 reflex; Future  -     Ferritin; Future  -     POCT ECG    Hypertension with renal disease  -     CBC and differential; Future  -     Comprehensive metabolic panel; Future  -     TSH, 3rd generation with Free T4 reflex; Future  -     Ferritin; Future  -     POCT ECG    Coronary artery disease involving native coronary artery of native heart without angina pectoris  -     CBC and differential; Future  -     Comprehensive metabolic panel; Future  -     TSH, 3rd generation with Free T4 reflex; Future  -     Ferritin; Future  -     POCT ECG  -     Holter monitor - 48 hour;  Future    Malignant neoplasm of urinary bladder, unspecified site (HCC)  -     CBC and differential; Future  -     Comprehensive metabolic panel; Future  -     TSH, 3rd generation with Free T4 reflex; Future  -     Ferritin; Future  -     POCT ECG    CKD (chronic kidney disease) stage 4, GFR 15-29 ml/min (HCC)  -     Holter monitor - 48 hour; Future    Mixed hyperlipidemia    Chronic bronchitis, unspecified chronic bronchitis type (HCC)    Moderate major depression, single episode (HCC)    Secondary renal hyperparathyroidism (HCC)    Thrombocytopenia (HCC)    Iron deficiency anemia due to chronic blood loss  -     Ferrous Sulfate Dried (Feosol) 200 (65 Fe) MG TABS; Take 65 mg by mouth daily    Syncope, unspecified syncope type  -     CBC and differential; Future  -     Comprehensive metabolic panel; Future  -     TSH, 3rd generation with Free T4 reflex; Future  -     Ferritin; Future  -     POCT ECG  -     Holter monitor - 48 hour; Future    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          Subjective:      Patient ID: Alem Perez is a 64 y o  male  Chief Complaint   Patient presents with    Transition of Care Management     D/C CORDEL[facility] Cerrato 2 17 21 for PVD    Loss of Consciousness     passed out on Saturday, BP 95/65 taken by family member  Has not taken Metoprolol for the past 2 days  Current Outpatient Medications:     acetaminophen (TYLENOL) 325 mg tablet, Take 650 mg by mouth every 6 (six) hours as needed for mild pain, Disp: , Rfl:     ALPRAZolam (XANAX) 0 25 mg tablet, At twice a day as needed for anxiety, Disp: 30 tablet, Rfl: 0    Azelastine HCl 0 15 % SOLN, Inhale 1 spray 2 (two) times a day (Patient taking differently: Inhale 1 spray 2 (two) times a day as needed ), Disp: 30 mL, Rfl: 3    bismuth subsalicylate (PEPTO BISMOL) 262 MG chewable tablet, Chew 524 mg daily as needed for indigestion  , Disp: , Rfl:     calcitriol (ROCALTROL) 0 25 mcg capsule, Take by mouth daily , Disp: , Rfl:     Cholecalciferol (Vitamin D3) 1 25 MG (76858 UT) CAPS, TAKE 1 CAPSULE BY MOUTH EVERY 30 DAYS, Disp: 12 capsule, Rfl: 0    docusate sodium (COLACE) 100 mg capsule, Take 1 capsule (100 mg total) by mouth 2 (two) times a day, Disp: 10 capsule, Rfl: 0    fluticasone (FLONASE) 50 mcg/act nasal spray, One spray in each nostril twice a day, Disp: 1 Bottle, Rfl: 3    furosemide (LASIX) 20 mg tablet, TAKE 1 TABLET BY MOUTH AS NEEDED FOR EDEMA, Disp: 90 tablet, Rfl: 1    gabapentin (NEURONTIN) 300 mg capsule, Take 1 capsule (300 mg total) by mouth daily at bedtime, Disp: 90 capsule, Rfl: 3    gabapentin (NEURONTIN) 600 MG tablet, Take 300 mg by mouth 3 (three) times a day, Disp: , Rfl:     Insulin Pen Needle 31G X 8 MM MISC, Inject 3 times a day, Disp: 100 each, Rfl: 2    Lantus SoloStar 100 units/mL injection pen, 15 units qhs, Disp: 10 pen, Rfl: 1    latanoprost (XALATAN) 0 005 % ophthalmic solution, Administer 1 drop to both eyes daily at bedtime, Disp: , Rfl:     Mirabegron ER (Myrbetriq) 50 MG TB24, Take by mouth daily , Disp: , Rfl:     multivitamin (THERAGRAN) TABS, Take 1 tablet by mouth daily  , Disp: , Rfl:     NovoLOG FlexPen 100 units/mL injection pen, 10 units with each meal , Disp: 5 pen, Rfl: 1    aspirin 81 mg chewable tablet, Chew 1 tablet (81 mg total) daily, Disp: 30 tablet, Rfl: 0    Ferrous Sulfate Dried (Feosol) 200 (65 Fe) MG TABS, Take 65 mg by mouth daily, Disp: 30 each, Rfl: 1    isosorbide mononitrate (IMDUR) 30 mg 24 hr tablet, Take 3 tablets (90 mg total) by mouth daily (Patient taking differently: Take 30 mg by mouth daily ), Disp: 90 tablet, Rfl: 0    metoprolol succinate (TOPROL-XL) 100 mg 24 hr tablet, Take 1 tablet (100 mg total) by mouth daily, Disp: 30 tablet, Rfl: 0    pantoprazole (PROTONIX) 40 mg tablet, Take 1 tablet (40 mg total) by mouth daily in the early morning, Disp: 30 tablet, Rfl: 0    sertraline (ZOLOFT) 50 mg tablet, Take 1 tablet (50 mg total) by mouth daily, Disp: 30 tablet, Rfl: 3    tamsulosin (FLOMAX) 0 4 mg, Take 1 capsule (0 4 mg total) by mouth daily at bedtime, Disp: 30 capsule, Rfl: 0    HPI    The following portions of the patient's history were reviewed and updated as appropriate: allergies, current medications, past family history, past medical history, past social history, past surgical history and problem list     Review of Systems   Constitutional: Negative  Negative for activity change, appetite change, fatigue, fever and unexpected weight change  HENT: Negative for congestion, ear pain, hearing loss, mouth sores, postnasal drip, rhinorrhea, sore throat, trouble swallowing and voice change  Eyes: Negative for pain, redness and visual disturbance  Respiratory: Negative for cough, chest tightness, shortness of breath and wheezing  Cardiovascular: Negative for chest pain, palpitations and leg swelling  Gastrointestinal: Negative for abdominal distention, abdominal pain, blood in stool, constipation, diarrhea and nausea  Endocrine: Negative for cold intolerance, heat intolerance, polydipsia, polyphagia and polyuria  Genitourinary: Negative for difficulty urinating, dysuria, flank pain, frequency, hematuria and urgency  Musculoskeletal: Negative for arthralgias, back pain, gait problem, joint swelling and myalgias  Skin: Negative for color change and pallor  Neurological: Negative for dizziness, tremors, seizures, syncope, weakness, numbness and headaches  Hematological: Negative for adenopathy  Does not bruise/bleed easily  Psychiatric/Behavioral: Negative  Negative for sleep disturbance  The patient is not nervous/anxious  Fainting         Objective:    /90 (BP Location: Left arm, Patient Position: Sitting, Cuff Size: Standard)   Pulse 86   Temp 97 8 °F (36 6 °C)   Resp 12      Physical Exam  Vitals signs and nursing note reviewed  Constitutional:       Appearance: He is well-developed  Comments: Examined in the wheelchair in no acute distress   HENT:      Head: Normocephalic        Right Ear: External ear normal       Left Ear: External ear normal  Nose: Nose normal       Mouth/Throat:      Pharynx: No oropharyngeal exudate  Eyes:      Conjunctiva/sclera: Conjunctivae normal       Pupils: Pupils are equal, round, and reactive to light  Neck:      Musculoskeletal: Normal range of motion and neck supple  Thyroid: No thyromegaly  Cardiovascular:      Rate and Rhythm: Normal rate and regular rhythm  Heart sounds: Normal heart sounds  No murmur  No friction rub  No gallop  Comments: S1-S2 regular rhythm  Extremities no edema  Apical rate 90  Pulmonary:      Effort: Pulmonary effort is normal  No respiratory distress  Breath sounds: Normal breath sounds  No wheezing or rales  Comments: Lungs are clear no wheezing rales or rhonchi  Abdominal:      General: Bowel sounds are normal  There is no distension  Palpations: Abdomen is soft  There is no mass  Tenderness: There is no abdominal tenderness  There is no guarding or rebound  Musculoskeletal: Normal range of motion  Lymphadenopathy:      Cervical: No cervical adenopathy  Skin:     General: Skin is warm and dry  Neurological:      Mental Status: He is alert and oriented to person, place, and time     Psychiatric:         Behavior: Behavior normal          Judgment: Judgment normal

## 2021-03-02 NOTE — LETTER
March 2, 2021     Moon Bose    Patient: Nader James   YOB: 1945   Date of Visit: 3/2/2021       Dear Dr Patrick Bose: Thank you for referring Nithya Srivastava to me for evaluation  Below are my notes for this consultation  If you have questions, please do not hesitate to call me  I look forward to following your patient along with you  Sincerely,        Mike Rosales MD        CC: No Recipients  Mike Rosales MD  3/2/2021  9:45 AM  Signed  Assessment/Plan: see Cardiology Holter monitor start iron back in 4 weeks sooner if needed if you have recurrence syncope go to the emergency room    EKG sinus rhythm rate of 90 poor R-wave progression in the anterior leads V1 to V3 no acute changes 1 PVC compared to the EKG of 2020 in August February 4th this year the hemoglobin A1c is 7 good diabetic control     problem 1  Syncope had a syncopal episode 2 days ago he has had 5 episodes in the last year  He stop the metoprolol succinate I told him to go back on all the medications he should have an event recorder putting by his cardiologist will do an EKG today all order a Holter monitor for 48 hours denies any chest pain palpitation shortness of breath the last episode happened when he was sitting in the dining room table he was not eating talking to his grandchildren  And he passed out the eyes rolled up he was not shaking occur for few minutes the did a blood sugars with was 200 the give him something to drink when he woke up and the granddaughter suggested to stop the metoprolol his blood pressure is elevated here now heart rate of 90 he denies any angina I told him to resume the metoprolol he feels dizzy he can cut it in half he should see the cardiologist sin will do an EKG today I will order a Holter monitor he appears in no distress     reviewing the labs he is anemic iron deficiency anemia was started him on iron 1 tablet daily    Will check a CBC when he comes back    Results for orders placed or performed during the hospital encounter of 02/07/21   Urine culture    Specimen: Urine, Clean Catch   Result Value Ref Range    Urine Culture <10,000 cfu/ml Staphylococcus coagulase negative (A)    Urine culture    Specimen: Urine, Indwelling Shirley Catheter   Result Value Ref Range    Urine Culture No Growth <1000 cfu/mL    Platelet count   Result Value Ref Range    Platelets 118 269 - 095 Thousands/uL    MPV 10 7 8 9 - 12 7 fL   Basic metabolic panel   Result Value Ref Range    Sodium 139 136 - 145 mmol/L    Potassium 4 5 3 5 - 5 3 mmol/L    Chloride 106 100 - 108 mmol/L    CO2 24 21 - 32 mmol/L    ANION GAP 9 4 - 13 mmol/L    BUN 38 (H) 5 - 25 mg/dL    Creatinine 2 03 (H) 0 60 - 1 30 mg/dL    Glucose 104 65 - 140 mg/dL    Glucose, Fasting 104 (H) 65 - 99 mg/dL    Calcium 8 3 8 3 - 10 1 mg/dL    eGFR 35 ml/min/1 73sq m   CBC   Result Value Ref Range    WBC 6 74 4 31 - 10 16 Thousand/uL    RBC 3 39 (L) 3 88 - 5 62 Million/uL    Hemoglobin 10 4 (L) 12 0 - 17 0 g/dL    Hematocrit 31 4 (L) 36 5 - 49 3 %    MCV 93 82 - 98 fL    MCH 30 7 26 8 - 34 3 pg    MCHC 33 1 31 4 - 37 4 g/dL    RDW 13 2 11 6 - 15 1 %    Platelets 081 046 - 174 Thousands/uL    MPV 10 7 8 9 - 12 7 fL   Protime-INR   Result Value Ref Range    Protime 13 6 11 6 - 14 5 seconds    INR 1 06 0 84 - 1 19   Hemoglobin and hematocrit, blood   Result Value Ref Range    Hemoglobin 9 2 (L) 12 0 - 17 0 g/dL    Hematocrit 28 0 (L) 36 5 - 49 3 %   Basic metabolic panel   Result Value Ref Range    Sodium 138 136 - 145 mmol/L    Potassium 5 4 (H) 3 5 - 5 3 mmol/L    Chloride 106 100 - 108 mmol/L    CO2 26 21 - 32 mmol/L    ANION GAP 6 4 - 13 mmol/L    BUN 35 (H) 5 - 25 mg/dL    Creatinine 2 02 (H) 0 60 - 1 30 mg/dL    Glucose 251 (H) 65 - 140 mg/dL    Calcium 7 9 (L) 8 3 - 10 1 mg/dL    eGFR 36 ml/min/1 73sq m   Basic metabolic panel   Result Value Ref Range    Sodium 138 136 - 145 mmol/L    Potassium 4 5 3 5 - 5 3 mmol/L    Chloride 106 100 - 108 mmol/L CO2 23 21 - 32 mmol/L    ANION GAP 9 4 - 13 mmol/L    BUN 33 (H) 5 - 25 mg/dL    Creatinine 1 96 (H) 0 60 - 1 30 mg/dL    Glucose 146 (H) 65 - 140 mg/dL    Calcium 7 9 (L) 8 3 - 10 1 mg/dL    eGFR 37 ml/min/1 73sq m   CBC and Platelet   Result Value Ref Range    WBC 7 78 4 31 - 10 16 Thousand/uL    RBC 3 03 (L) 3 88 - 5 62 Million/uL    Hemoglobin 9 5 (L) 12 0 - 17 0 g/dL    Hematocrit 28 4 (L) 36 5 - 49 3 %    MCV 94 82 - 98 fL    MCH 31 4 26 8 - 34 3 pg    MCHC 33 5 31 4 - 37 4 g/dL    RDW 13 2 11 6 - 15 1 %    Platelets 055 664 - 618 Thousands/uL    MPV 10 1 8 9 - 12 7 fL   Urinalysis with microscopic   Result Value Ref Range    Clarity, UA Cloudy     Color, UA Red     Specific [place], UA 1 015 1 003 - 1 030    pH, UA 6 5 4 5, 5 0, 5 5, 6 0, 6 5, 7 0, 7 5, 8 0    Glucose,  (1/10%) (A) Negative mg/dl    Ketones, UA 15 (1+) (A) Negative mg/dl    Blood, UA Large (A) Negative    Protein, UA >=300 (A) Negative mg/dl    Nitrite, UA Positive (A) Negative    Bilirubin, UA Interference- unable to analyze (A) Negative    Urobilinogen, UA 4 0 (A) 0 2, 1 0 E U /dl E U /dl    Leukocytes, UA Moderate (A) Negative    WBC, UA Field obscured, unable to enumerate (A) None Seen, 2-4 /hpf    RBC, UA Innumerable (A) None Seen, 2-4 /hpf    Bacteria, UA Field obscured, unable to enumerate (A) None Seen, Occasional /hpf    Epithelial Cells Field obscured, unable to enumerate (A) None Seen, Occasional /hpf   Basic metabolic panel   Result Value Ref Range    Sodium 139 136 - 145 mmol/L    Potassium 4 3 3 5 - 5 3 mmol/L    Chloride 107 100 - 108 mmol/L    CO2 23 21 - 32 mmol/L    ANION GAP 9 4 - 13 mmol/L    BUN 32 (H) 5 - 25 mg/dL    Creatinine 2 05 (H) 0 60 - 1 30 mg/dL    Glucose 113 65 - 140 mg/dL    Calcium 8 4 8 3 - 10 1 mg/dL    eGFR 35 ml/min/1 73sq m   CBC   Result Value Ref Range    WBC 7 24 4 31 - 10 16 Thousand/uL    RBC 2 95 (L) 3 88 - 5 62 Million/uL    Hemoglobin 9 1 (L) 12 0 - 17 0 g/dL    Hematocrit 27 3 (L) 36 5 - 49 3 %    MCV 93 82 - 98 fL    MCH 30 8 26 8 - 34 3 pg    MCHC 33 3 31 4 - 37 4 g/dL    RDW 13 2 11 6 - 15 1 %    Platelets 671 099 - 306 Thousands/uL    MPV 10 6 8 9 - 12 7 fL   UA w Reflex to Microscopic w Reflex to Culture    Specimen: Urine, Indwelling Shirley Catheter   Result Value Ref Range    Color, UA Yellow     Clarity, UA Slightly Cloudy     Specific [place], UA 1 015 1 003 - 1 030    pH, UA 6 0 4 5, 5 0, 5 5, 6 0, 6 5, 7 0, 7 5, 8 0    Leukocytes, UA Moderate (A) Negative    Nitrite, UA Negative Negative    Protein,  (2+) (A) Negative mg/dl    Glucose, UA Negative Negative mg/dl    Ketones, UA Negative Negative mg/dl    Urobilinogen, UA 0 2 0 2, 1 0 E U /dl E U /dl    Bilirubin, UA Negative Negative    Blood, UA Large (A) Negative   Basic metabolic panel   Result Value Ref Range    Sodium 136 136 - 145 mmol/L    Potassium 4 6 3 5 - 5 3 mmol/L    Chloride 104 100 - 108 mmol/L    CO2 23 21 - 32 mmol/L    ANION GAP 9 4 - 13 mmol/L    BUN 30 (H) 5 - 25 mg/dL    Creatinine 2 14 (H) 0 60 - 1 30 mg/dL    Glucose 118 65 - 140 mg/dL    Calcium 8 4 8 3 - 10 1 mg/dL    eGFR 33 ml/min/1 73sq m   CBC and differential   Result Value Ref Range    WBC 9 84 4 31 - 10 16 Thousand/uL    RBC 3 15 (L) 3 88 - 5 62 Million/uL    Hemoglobin 9 7 (L) 12 0 - 17 0 g/dL    Hematocrit 29 2 (L) 36 5 - 49 3 %    MCV 93 82 - 98 fL    MCH 30 8 26 8 - 34 3 pg    MCHC 33 2 31 4 - 37 4 g/dL    RDW 13 5 11 6 - 15 1 %    MPV 10 7 8 9 - 12 7 fL    Platelets 957 747 - 968 Thousands/uL    nRBC 0 /100 WBCs    Neutrophils Relative 69 43 - 75 %    Immat GRANS % 0 0 - 2 %    Lymphocytes Relative 21 14 - 44 %    Monocytes Relative 7 4 - 12 %    Eosinophils Relative 3 0 - 6 %    Basophils Relative 0 0 - 1 %    Neutrophils Absolute 6 68 1 85 - 7 62 Thousands/µL    Immature Grans Absolute 0 04 0 00 - 0 20 Thousand/uL    Lymphocytes Absolute 2 09 0 60 - 4 47 Thousands/µL    Monocytes Absolute 0 73 0 17 - 1 22 Thousand/µL    Eosinophils Absolute 0  26 0 00 - 0 61 Thousand/µL    Basophils Absolute 0 04 0 00 - 0 10 Thousands/µL   Magnesium   Result Value Ref Range    Magnesium 1 9 1 6 - 2 6 mg/dL   Phosphorus   Result Value Ref Range    Phosphorus 3 6 2 3 - 4 1 mg/dL   Urine Microscopic   Result Value Ref Range    RBC, UA Innumerable (A) None Seen, 2-4 /hpf    WBC, UA Innumerable (A) None Seen, 2-4 /hpf    Epithelial Cells Occasional None Seen, Occasional /hpf    Bacteria, UA Occasional None Seen, Occasional /hpf   Basic metabolic panel   Result Value Ref Range    Sodium 137 136 - 145 mmol/L    Potassium 4 5 3 5 - 5 3 mmol/L    Chloride 104 100 - 108 mmol/L    CO2 26 21 - 32 mmol/L    ANION GAP 7 4 - 13 mmol/L    BUN 29 (H) 5 - 25 mg/dL    Creatinine 2 03 (H) 0 60 - 1 30 mg/dL    Glucose 102 65 - 140 mg/dL    Calcium 8 4 8 3 - 10 1 mg/dL    eGFR 35 ml/min/1 73sq m   Magnesium   Result Value Ref Range    Magnesium 1 9 1 6 - 2 6 mg/dL   Phosphorus   Result Value Ref Range    Phosphorus 3 7 2 3 - 4 1 mg/dL   Basic metabolic panel   Result Value Ref Range    Sodium 138 136 - 145 mmol/L    Potassium 4 3 3 5 - 5 3 mmol/L    Chloride 103 100 - 108 mmol/L    CO2 26 21 - 32 mmol/L    ANION GAP 9 4 - 13 mmol/L    BUN 30 (H) 5 - 25 mg/dL    Creatinine 2 04 (H) 0 60 - 1 30 mg/dL    Glucose 99 65 - 140 mg/dL    Calcium 8 7 8 3 - 10 1 mg/dL    eGFR 35 ml/min/1 73sq m   Prealbumin   Result Value Ref Range    Prealbumin 20 3 18 0 - 40 0 mg/dL   CBC and Platelet   Result Value Ref Range    WBC 7 37 4 31 - 10 16 Thousand/uL    RBC 3 03 (L) 3 88 - 5 62 Million/uL    Hemoglobin 9 4 (L) 12 0 - 17 0 g/dL    Hematocrit 28 5 (L) 36 5 - 49 3 %    MCV 94 82 - 98 fL    MCH 31 0 26 8 - 34 3 pg    MCHC 33 0 31 4 - 37 4 g/dL    RDW 13 3 11 6 - 15 1 %    Platelets 808 127 - 779 Thousands/uL    MPV 10 6 8 9 - 12 7 fL   Basic metabolic panel   Result Value Ref Range    Sodium 138 136 - 145 mmol/L    Potassium 4 2 3 5 - 5 3 mmol/L    Chloride 104 100 - 108 mmol/L    CO2 25 21 - 32 mmol/L ANION GAP 9 4 - 13 mmol/L    BUN 31 (H) 5 - 25 mg/dL    Creatinine 2 02 (H) 0 60 - 1 30 mg/dL    Glucose 88 65 - 140 mg/dL    Calcium 8 7 8 3 - 10 1 mg/dL    eGFR 36 ml/min/1 73sq m   CBC and Platelet   Result Value Ref Range    WBC 7 97 4 31 - 10 16 Thousand/uL    RBC 2 97 (L) 3 88 - 5 62 Million/uL    Hemoglobin 9 1 (L) 12 0 - 17 0 g/dL    Hematocrit 28 0 (L) 36 5 - 49 3 %    MCV 94 82 - 98 fL    MCH 30 6 26 8 - 34 3 pg    MCHC 32 5 31 4 - 37 4 g/dL    RDW 13 3 11 6 - 15 1 %    Platelets 864 604 - 909 Thousands/uL    MPV 9 9 8 9 - 12 7 fL   Basic metabolic panel   Result Value Ref Range    Sodium 138 136 - 145 mmol/L    Potassium 4 2 3 5 - 5 3 mmol/L    Chloride 104 100 - 108 mmol/L    CO2 26 21 - 32 mmol/L    ANION GAP 8 4 - 13 mmol/L    BUN 34 (H) 5 - 25 mg/dL    Creatinine 2 06 (H) 0 60 - 1 30 mg/dL    Glucose 108 65 - 140 mg/dL    Calcium 9 1 8 3 - 10 1 mg/dL    eGFR 35 ml/min/1 73sq m   CBC and Platelet   Result Value Ref Range    WBC 7 56 4 31 - 10 16 Thousand/uL    RBC 3 03 (L) 3 88 - 5 62 Million/uL    Hemoglobin 9 2 (L) 12 0 - 17 0 g/dL    Hematocrit 28 5 (L) 36 5 - 49 3 %    MCV 94 82 - 98 fL    MCH 30 4 26 8 - 34 3 pg    MCHC 32 3 31 4 - 37 4 g/dL    RDW 13 3 11 6 - 15 1 %    Platelets 417 607 - 385 Thousands/uL    MPV 9 8 8 9 - 12 7 fL   Basic metabolic panel   Result Value Ref Range    Sodium 138 136 - 145 mmol/L    Potassium 4 7 3 5 - 5 3 mmol/L    Chloride 104 100 - 108 mmol/L    CO2 26 21 - 32 mmol/L    ANION GAP 8 4 - 13 mmol/L    BUN 33 (H) 5 - 25 mg/dL    Creatinine 2 08 (H) 0 60 - 1 30 mg/dL    Glucose 108 65 - 140 mg/dL    Calcium 8 5 8 3 - 10 1 mg/dL    eGFR 34 ml/min/1 73sq m   Iron Saturation %   Result Value Ref Range    Iron Saturation 18 %    TIBC 285 250 - 450 ug/dL    Iron 50 (L) 65 - 175 ug/dL   Ferritin   Result Value Ref Range    Ferritin 23 8 - 388 ng/mL   Fingerstick Glucose (POCT)   Result Value Ref Range    POC Glucose 94 65 - 140 mg/dl   Fingerstick Glucose (POCT)   Result Value Ref Range    POC Glucose 154 (H) 65 - 140 mg/dl   Fingerstick Glucose (POCT)   Result Value Ref Range    POC Glucose 104 65 - 140 mg/dl   Fingerstick Glucose (POCT)   Result Value Ref Range    POC Glucose 105 65 - 140 mg/dl   Fingerstick Glucose (POCT)   Result Value Ref Range    POC Glucose 248 (H) 65 - 140 mg/dl   Fingerstick Glucose (POCT)   Result Value Ref Range    POC Glucose 106 65 - 140 mg/dl   Fingerstick Glucose (POCT)   Result Value Ref Range    POC Glucose 182 (H) 65 - 140 mg/dl   Fingerstick Glucose (POCT)   Result Value Ref Range    POC Glucose 127 65 - 140 mg/dl   Fingerstick Glucose (POCT)   Result Value Ref Range    POC Glucose 145 (H) 65 - 140 mg/dl   Fingerstick Glucose (POCT)   Result Value Ref Range    POC Glucose 96 65 - 140 mg/dl   Fingerstick Glucose (POCT)   Result Value Ref Range    POC Glucose 106 65 - 140 mg/dl   Fingerstick Glucose (POCT)   Result Value Ref Range    POC Glucose 303 (H) 65 - 140 mg/dl   Fingerstick Glucose (POCT)   Result Value Ref Range    POC Glucose 163 (H) 65 - 140 mg/dl   Fingerstick Glucose (POCT)   Result Value Ref Range    POC Glucose 113 65 - 140 mg/dl   Fingerstick Glucose (POCT)   Result Value Ref Range    POC Glucose 89 65 - 140 mg/dl   Fingerstick Glucose (POCT)   Result Value Ref Range    POC Glucose 88 65 - 140 mg/dl   Fingerstick Glucose (POCT)   Result Value Ref Range    POC Glucose 99 65 - 140 mg/dl   Fingerstick Glucose (POCT)   Result Value Ref Range    POC Glucose 125 65 - 140 mg/dl   Fingerstick Glucose (POCT)   Result Value Ref Range    POC Glucose 121 65 - 140 mg/dl   Fingerstick Glucose (POCT)   Result Value Ref Range    POC Glucose 116 65 - 140 mg/dl   Fingerstick Glucose (POCT)   Result Value Ref Range    POC Glucose 107 65 - 140 mg/dl   Fingerstick Glucose (POCT)   Result Value Ref Range    POC Glucose 142 (H) 65 - 140 mg/dl   Fingerstick Glucose (POCT)   Result Value Ref Range    POC Glucose 128 65 - 140 mg/dl   Fingerstick Glucose (POCT)   Result Value Ref Range    POC Glucose 188 (H) 65 - 140 mg/dl   Fingerstick Glucose (POCT)   Result Value Ref Range    POC Glucose 152 (H) 65 - 140 mg/dl   Fingerstick Glucose (POCT)   Result Value Ref Range    POC Glucose 107 65 - 140 mg/dl   Fingerstick Glucose (POCT)   Result Value Ref Range    POC Glucose 204 (H) 65 - 140 mg/dl   Fingerstick Glucose (POCT)   Result Value Ref Range    POC Glucose 149 (H) 65 - 140 mg/dl   Fingerstick Glucose (POCT)   Result Value Ref Range    POC Glucose 161 (H) 65 - 140 mg/dl   Fingerstick Glucose (POCT)   Result Value Ref Range    POC Glucose 104 65 - 140 mg/dl   Fingerstick Glucose (POCT)   Result Value Ref Range    POC Glucose 131 65 - 140 mg/dl   Fingerstick Glucose (POCT)   Result Value Ref Range    POC Glucose 93 65 - 140 mg/dl   Fingerstick Glucose (POCT)   Result Value Ref Range    POC Glucose 100 65 - 140 mg/dl   Fingerstick Glucose (POCT)   Result Value Ref Range    POC Glucose 239 (H) 65 - 140 mg/dl   Fingerstick Glucose (POCT)   Result Value Ref Range    POC Glucose 170 (H) 65 - 140 mg/dl   Fingerstick Glucose (POCT)   Result Value Ref Range    POC Glucose 98 65 - 140 mg/dl   Fingerstick Glucose (POCT)   Result Value Ref Range    POC Glucose 109 65 - 140 mg/dl         He was admitted to the hospital for peripheral vascular disease had left leg angiogram left foot debridement he has some hematuria while he was in the hospital on the went cystoscopy with cauterization of the urethra hematuria resolved Shirley was removed he had an angioplasty with good results is none wet weight-bearing of the left lower extremities is following up with vascular Podiatry Nephrology Internal Medicine me as an outpatient    Diabetes had no episodes of hypoglycemia    Chronic renal failure appears to be stable stage IV     Peripheral vascular disease as above     History of bladder cancer with gross hematuria follow-up with Urology hematuria has resolved    TCM Call (since 1/30/2021) Date and time call was made  2/18/2021  9:45 AM    Hospital care reviewed  Records reviewed    Date of Admission  02/07/21    Date of discharge  02/17/21    Diagnosis  PVD    Disposition  Home    Were the patients medications reviewed and updated  No    Current Symptoms  None      TCM Call (since 1/30/2021)     Post hospital issues  None    Scheduled for follow up? Yes    Did you obtain your prescribed medications  Yes    Do you need help managing your prescriptions or medications  No    Is transportation to your appointment needed  No    I have advised the patient to call PCP with any new or worsening symptoms  Marlee Gr or Significiant other    Are you recieving any outpatient services  No    Are you recieving home care services  No    Are you using any community resources  No    Current waiver services  No    Have you fallen in the last 12 months  Yes    Interperter language line needed  No    Counseling  Patient          No problem-specific Assessment & Plan notes found for this encounter  Diagnoses and all orders for this visit:    Type 2 diabetes mellitus with stage 3 chronic kidney disease, with long-term current use of insulin, unspecified whether stage 3a or 3b CKD (HCC)  -     CBC and differential; Future  -     Comprehensive metabolic panel; Future  -     TSH, 3rd generation with Free T4 reflex; Future  -     Ferritin; Future  -     POCT ECG    Diabetic ulcer of left midfoot associated with type 2 diabetes mellitus, 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)  -     CBC and differential; Future  -     Comprehensive metabolic panel; Future  -     TSH, 3rd generation with Free T4 reflex; Future  -     Ferritin; Future  -     POCT ECG    Hypertension with renal disease  -     CBC and differential; Future  -     Comprehensive metabolic panel; Future  -     TSH, 3rd generation with Free T4 reflex; Future  -     Ferritin;  Future  -     POCT ECG    Coronary artery disease involving native coronary artery of native heart without angina pectoris  -     CBC and differential; Future  -     Comprehensive metabolic panel; Future  -     TSH, 3rd generation with Free T4 reflex; Future  -     Ferritin; Future  -     POCT ECG  -     Holter monitor - 48 hour; Future    Malignant neoplasm of urinary bladder, unspecified site (HCC)  -     CBC and differential; Future  -     Comprehensive metabolic panel; Future  -     TSH, 3rd generation with Free T4 reflex; Future  -     Ferritin; Future  -     POCT ECG    CKD (chronic kidney disease) stage 4, GFR 15-29 ml/min (HCC)  -     Holter monitor - 48 hour; Future    Mixed hyperlipidemia    Chronic bronchitis, unspecified chronic bronchitis type (HCC)    Moderate major depression, single episode (HCC)    Secondary renal hyperparathyroidism (HCC)    Thrombocytopenia (HCC)    Iron deficiency anemia due to chronic blood loss  -     Ferrous Sulfate Dried (Feosol) 200 (65 Fe) MG TABS; Take 65 mg by mouth daily    Syncope, unspecified syncope type  -     CBC and differential; Future  -     Comprehensive metabolic panel; Future  -     TSH, 3rd generation with Free T4 reflex; Future  -     Ferritin; Future  -     POCT ECG  -     Holter monitor - 48 hour; Future    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          Subjective:      Patient ID: Celeste Severin is a 64 y o  male  Chief Complaint   Patient presents with    Transition of Care Management     D/C CORDELIA Cerrato 2 17 21 for PVD    Loss of Consciousness     passed out on Saturday, BP 95/65 taken by family member  Has not taken Metoprolol for the past 2 days             Current Outpatient Medications:     acetaminophen (TYLENOL) 325 mg tablet, Take 650 mg by mouth every 6 (six) hours as needed for mild pain, Disp: , Rfl:     ALPRAZolam (XANAX) 0 25 mg tablet, At twice a day as needed for anxiety, Disp: 30 tablet, Rfl: 0    Azelastine HCl 0 15 % SOLN, Inhale 1 spray 2 (two) times a day (Patient taking differently: Inhale 1 spray 2 (two) times a day as needed ), Disp: 30 mL, Rfl: 3    bismuth subsalicylate (PEPTO BISMOL) 262 MG chewable tablet, Chew 524 mg daily as needed for indigestion  , Disp: , Rfl:     calcitriol (ROCALTROL) 0 25 mcg capsule, Take by mouth daily , Disp: , Rfl:     Cholecalciferol (Vitamin D3) 1 25 MG (07854 UT) CAPS, TAKE 1 CAPSULE BY MOUTH EVERY 30 DAYS, Disp: 12 capsule, Rfl: 0    docusate sodium (COLACE) 100 mg capsule, Take 1 capsule (100 mg total) by mouth 2 (two) times a day, Disp: 10 capsule, Rfl: 0    fluticasone (FLONASE) 50 mcg/act nasal spray, One spray in each nostril twice a day, Disp: 1 Bottle, Rfl: 3    furosemide (LASIX) 20 mg tablet, TAKE 1 TABLET BY MOUTH AS NEEDED FOR EDEMA, Disp: 90 tablet, Rfl: 1    gabapentin (NEURONTIN) 300 mg capsule, Take 1 capsule (300 mg total) by mouth daily at bedtime, Disp: 90 capsule, Rfl: 3    gabapentin (NEURONTIN) 600 MG tablet, Take 300 mg by mouth 3 (three) times a day, Disp: , Rfl:     Insulin Pen Needle 31G X 8 MM MISC, Inject 3 times a day, Disp: 100 each, Rfl: 2    Lantus SoloStar 100 units/mL injection pen, 15 units qhs, Disp: 10 pen, Rfl: 1    latanoprost (XALATAN) 0 005 % ophthalmic solution, Administer 1 drop to both eyes daily at bedtime, Disp: , Rfl:     Mirabegron ER (Myrbetriq) 50 MG TB24, Take by mouth daily , Disp: , Rfl:     multivitamin (THERAGRAN) TABS, Take 1 tablet by mouth daily  , Disp: , Rfl:     NovoLOG FlexPen 100 units/mL injection pen, 10 units with each meal , Disp: 5 pen, Rfl: 1    aspirin 81 mg chewable tablet, Chew 1 tablet (81 mg total) daily, Disp: 30 tablet, Rfl: 0    Ferrous Sulfate Dried (Feosol) 200 (65 Fe) MG TABS, Take 65 mg by mouth daily, Disp: 30 each, Rfl: 1    isosorbide mononitrate (IMDUR) 30 mg 24 hr tablet, Take 3 tablets (90 mg total) by mouth daily (Patient taking differently: Take 30 mg by mouth daily ), Disp: 90 tablet, Rfl: 0    metoprolol succinate (TOPROL-XL) 100 mg 24 hr tablet, Take 1 tablet (100 mg total) by mouth daily, Disp: 30 tablet, Rfl: 0    pantoprazole (PROTONIX) 40 mg tablet, Take 1 tablet (40 mg total) by mouth daily in the early morning, Disp: 30 tablet, Rfl: 0    sertraline (ZOLOFT) 50 mg tablet, Take 1 tablet (50 mg total) by mouth daily, Disp: 30 tablet, Rfl: 3    tamsulosin (FLOMAX) 0 4 mg, Take 1 capsule (0 4 mg total) by mouth daily at bedtime, Disp: 30 capsule, Rfl: 0    HPI    The following portions of the patient's history were reviewed and updated as appropriate: allergies, current medications, past family history, past medical history, past social history, past surgical history and problem list     Review of Systems   Constitutional: Negative  Negative for activity change, appetite change, fatigue, fever and unexpected weight change  HENT: Negative for congestion, ear pain, hearing loss, mouth sores, postnasal drip, rhinorrhea, sore throat, trouble swallowing and voice change  Eyes: Negative for pain, redness and visual disturbance  Respiratory: Negative for cough, chest tightness, shortness of breath and wheezing  Cardiovascular: Negative for chest pain, palpitations and leg swelling  Gastrointestinal: Negative for abdominal distention, abdominal pain, blood in stool, constipation, diarrhea and nausea  Endocrine: Negative for cold intolerance, heat intolerance, polydipsia, polyphagia and polyuria  Genitourinary: Negative for difficulty urinating, dysuria, flank pain, frequency, hematuria and urgency  Musculoskeletal: Negative for arthralgias, back pain, gait problem, joint swelling and myalgias  Skin: Negative for color change and pallor  Neurological: Negative for dizziness, tremors, seizures, syncope, weakness, numbness and headaches  Hematological: Negative for adenopathy  Does not bruise/bleed easily  Psychiatric/Behavioral: Negative  Negative for sleep disturbance   The patient is not nervous/anxious  Fainting         Objective:    /90 (BP Location: Left arm, Patient Position: Sitting, Cuff Size: Standard)   Pulse 86   Temp 97 8 °F (36 6 °C)   Resp 12      Physical Exam  Vitals signs and nursing note reviewed  Constitutional:       Appearance: He is well-developed  Comments: Examined in the wheelchair in no acute distress   HENT:      Head: Normocephalic  Right Ear: External ear normal       Left Ear: External ear normal       Nose: Nose normal       Mouth/Throat:      Pharynx: No oropharyngeal exudate  Eyes:      Conjunctiva/sclera: Conjunctivae normal       Pupils: Pupils are equal, round, and reactive to light  Neck:      Musculoskeletal: Normal range of motion and neck supple  Thyroid: No thyromegaly  Cardiovascular:      Rate and Rhythm: Normal rate and regular rhythm  Heart sounds: Normal heart sounds  No murmur  No friction rub  No gallop  Comments: S1-S2 regular rhythm  Extremities no edema  Apical rate 90  Pulmonary:      Effort: Pulmonary effort is normal  No respiratory distress  Breath sounds: Normal breath sounds  No wheezing or rales  Comments: Lungs are clear no wheezing rales or rhonchi  Abdominal:      General: Bowel sounds are normal  There is no distension  Palpations: Abdomen is soft  There is no mass  Tenderness: There is no abdominal tenderness  There is no guarding or rebound  Musculoskeletal: Normal range of motion  Lymphadenopathy:      Cervical: No cervical adenopathy  Skin:     General: Skin is warm and dry  Neurological:      Mental Status: He is alert and oriented to person, place, and time     Psychiatric:         Behavior: Behavior normal          Judgment: Judgment normal  no

## 2021-03-02 NOTE — PATIENT INSTRUCTIONS
Resume all your medications including metoprolol succinate if  Start taking iron 1 tablet daily  Will see her back in 4 weeks   He have recurrence syncope go to the emergency room immediately  Follow up with Cardiology to see if they can insert an event recorder  Will check a Holter monitor in the meantime

## 2021-03-04 ENCOUNTER — APPOINTMENT (EMERGENCY)
Dept: NON INVASIVE DIAGNOSTICS | Facility: HOSPITAL | Age: 76
End: 2021-03-04
Payer: MEDICARE

## 2021-03-04 ENCOUNTER — APPOINTMENT (EMERGENCY)
Dept: RADIOLOGY | Facility: HOSPITAL | Age: 76
End: 2021-03-04
Payer: MEDICARE

## 2021-03-04 ENCOUNTER — HOSPITAL ENCOUNTER (EMERGENCY)
Facility: HOSPITAL | Age: 76
Discharge: HOME | End: 2021-03-04
Attending: EMERGENCY MEDICINE
Payer: MEDICARE

## 2021-03-04 VITALS
DIASTOLIC BLOOD PRESSURE: 82 MMHG | HEART RATE: 78 BPM | SYSTOLIC BLOOD PRESSURE: 154 MMHG | TEMPERATURE: 97.9 F | OXYGEN SATURATION: 100 % | RESPIRATION RATE: 18 BRPM

## 2021-03-04 DIAGNOSIS — S96.911A ANKLE STRAIN, RIGHT, INITIAL ENCOUNTER: Primary | ICD-10-CM

## 2021-03-04 PROCEDURE — 99284 EMERGENCY DEPT VISIT MOD MDM: CPT

## 2021-03-04 PROCEDURE — 99283 EMERGENCY DEPT VISIT LOW MDM: CPT | Performed by: EMERGENCY MEDICINE

## 2021-03-04 PROCEDURE — 73610 X-RAY EXAM OF ANKLE: CPT

## 2021-03-04 PROCEDURE — 93971 EXTREMITY STUDY: CPT | Performed by: SURGERY

## 2021-03-04 PROCEDURE — 93971 EXTREMITY STUDY: CPT

## 2021-03-04 RX ORDER — OXYCODONE HCL 10 MG/1
10 TABLET, FILM COATED, EXTENDED RELEASE ORAL EVERY 12 HOURS PRN
Qty: 10 TABLET | Refills: 0 | Status: SHIPPED | OUTPATIENT
Start: 2021-03-04 | End: 2021-03-14

## 2021-03-04 RX ORDER — EZETIMIBE 10 MG/1
10 TABLET ORAL
COMMUNITY
Start: 2021-01-22 | End: 2022-02-02 | Stop reason: SDUPTHER

## 2021-03-04 RX ORDER — ACETAMINOPHEN 325 MG/1
975 TABLET ORAL ONCE
Status: COMPLETED | OUTPATIENT
Start: 2021-03-04 | End: 2021-03-04

## 2021-03-04 RX ADMIN — ACETAMINOPHEN 975 MG: 325 TABLET, COATED ORAL at 07:06

## 2021-03-04 NOTE — ED PROVIDER NOTES
History  Chief Complaint   Patient presents with    Ankle Pain     medial right ankle pain that woke pt up from sleep  concerned for blood clot  warm to area  no bruising/redness  no known trauma to area  C/o medial R ankle pain since this am, woke him up from sleep  Pt  Has a hx of PAD and had a surgery last month on his L foot ( s/p L 5th metatarsal resection and revision and LLE angiogram /balloon angioplasty)  He is walking with a walker and may be favoring his L foot since the surgery placing more weight on his R foot   He is worried about a blood clot  No trauma, no redness, no fevers, no n/v                Prior to Admission Medications   Prescriptions Last Dose Informant Patient Reported? Taking? ALPRAZolam (XANAX) 0 25 mg tablet  Self No No   Sig: At twice a day as needed for anxiety   Azelastine HCl 0 15 % SOLN  Self No No   Sig: Inhale 1 spray 2 (two) times a day   Patient taking differently: Inhale 1 spray 2 (two) times a day as needed    Cholecalciferol (Vitamin D3) 1 25 MG (08612 UT) CAPS  Self No No   Sig: TAKE 1 CAPSULE BY MOUTH EVERY 30 DAYS   Ferrous Sulfate Dried (Feosol) 200 (65 Fe) MG TABS   No No   Sig: Take 65 mg by mouth daily   Insulin Pen Needle 31G X 8 MM MISC  Self No No   Sig: Inject 3 times a day   Lantus SoloStar 100 units/mL injection pen  Self No No   Sig: 15 units qhs   Mirabegron ER (Myrbetriq) 50 MG TB24  Self Yes No   Sig: Take by mouth daily    NovoLOG FlexPen 100 units/mL injection pen  Self No No   Sig: 10 units with each meal    acetaminophen (TYLENOL) 325 mg tablet  Self Yes No   Sig: Take 650 mg by mouth every 6 (six) hours as needed for mild pain   aspirin 81 mg chewable tablet  Self No No   Sig: Chew 1 tablet (81 mg total) daily   bismuth subsalicylate (PEPTO BISMOL) 262 MG chewable tablet  Self Yes No   Sig: Chew 524 mg daily as needed for indigestion     calcitriol (ROCALTROL) 0 25 mcg capsule  Self Yes No   Sig: Take by mouth daily    docusate sodium (COLACE) 100 mg capsule  Self No No   Sig: Take 1 capsule (100 mg total) by mouth 2 (two) times a day   ezetimibe (ZETIA) 10 mg tablet   Yes Yes   Si tablet   fluticasone (FLONASE) 50 mcg/act nasal spray  Self No No   Sig: One spray in each nostril twice a day   furosemide (LASIX) 20 mg tablet  Self No No   Sig: TAKE 1 TABLET BY MOUTH AS NEEDED FOR EDEMA   gabapentin (NEURONTIN) 300 mg capsule   No No   Sig: Take 1 capsule (300 mg total) by mouth daily at bedtime   gabapentin (NEURONTIN) 600 MG tablet  Self Yes No   Sig: Take 300 mg by mouth 3 (three) times a day   isosorbide mononitrate (IMDUR) 30 mg 24 hr tablet  Self No No   Sig: Take 3 tablets (90 mg total) by mouth daily   Patient taking differently: Take 30 mg by mouth daily    latanoprost (XALATAN) 0 005 % ophthalmic solution  Self Yes No   Sig: Administer 1 drop to both eyes daily at bedtime   metoprolol succinate (TOPROL-XL) 100 mg 24 hr tablet  Self No No   Sig: Take 1 tablet (100 mg total) by mouth daily   multivitamin (THERAGRAN) TABS  Self Yes No   Sig: Take 1 tablet by mouth daily     pantoprazole (PROTONIX) 40 mg tablet  Self No No   Sig: Take 1 tablet (40 mg total) by mouth daily in the early morning   sertraline (ZOLOFT) 50 mg tablet  Self No No   Sig: Take 1 tablet (50 mg total) by mouth daily   tamsulosin (FLOMAX) 0 4 mg  Self No No   Sig: Take 1 capsule (0 4 mg total) by mouth daily at bedtime      Facility-Administered Medications: None       Past Medical History:   Diagnosis Date    Anemia     Last assessed: 17    Anxiety     Arteriosclerotic cardiovascular disease     Last assessed: 17    Arthritis     Bladder cancer ([facility] [address] )     bladder- had BCG treatments    Chronic kidney disease     CKD (chronic kidney disease) stage 4, GFR 15-29 ml/min ([facility])     Colon polyp     Coronary artery disease     7 stents    Depression     Diabetes mellitus (HCC)     IDDM    GERD (gastroesophageal reflux disease)     Glaucoma     History of fusion of cervical spine     Hyperlipidemia     Hypertension     Insomnia     Last assessed: 11/14/12    Loss of hearing     has hearing aids but usually does not wear them    Other seasonal allergic rhinitis     Last assessed: 2/10/16    PAD (peripheral artery disease) (HCC)     Shortness of breath     on exertion    Spinal stenosis of lumbar region     Transient cerebral ischemia     Uses walker     w/c for longer distances       Past Surgical History:   Procedure Laterality Date    CARDIAC SURGERY      Cath stent placement  Last assessed: 3/9/17  Interventional Catheterization    CHOLECYSTECTOMY      COLONOSCOPY      CYSTOSCOPY      Diagnostic w/biopsy  Manuel Sandoval  Last assessed: 12/1/14    CYSTOURETHROSCOPY      w/cautery  Manuel Sandoval    GASTRIC BYPASS      For morbid obesity w/Shaji-en-Y   Resolved: 11/17/09    INCISION AND DRAINAGE OF WOUND Right 2/26/2017    Procedure: INCISION AND DRAINAGE (I&D) EXTREMITY WITH APPLICATION OF GRAFT JACKET;  Surgeon: Mariah Ely DPM;  [facility];  Service:     INCISION AND DRAINAGE OF WOUND Right 4/25/2017    Procedure: INCISION AND DRAINAGE (I&D) EXTREMITY, APPLICATION OF GRAFT;  Surgeon: Mariah Ely DPM;  [facility];  Service:     IR BIOPSY OTHER  7/2/2020    IR LOWER EXTREMITY ANGIOGRAM  2/8/2021    IR LOWER EXTREMITY ANGIOGRAM  2/11/2021    IR TUNNELED CENTRAL LINE PLACEMENT  12/24/2020    JOINT REPLACEMENT      christofer knees replaced    RI AMPUTATION METATARSAL+TOE,SINGLE Left 12/21/2020    Procedure: RAY RESECTION FOOT;  Surgeon: Bret Monsalve DPM;  [facility];  Service: Podiatry    RI AMPUTATION METATARSAL+TOE,SINGLE Left 12/31/2020    Procedure: 5TH MET RESECTION;  Surgeon: Bret Monsalve DPM;  [facility];  Service: Podiatry    RI CYSTOURETHROSCOPY W/IRRIG & EVAC CLOTS N/A 2/10/2021    Procedure: CYSTOSCOPY EVACUATION OF CLOTS, fulguration;  Surgeon: Nima Montana MD;  [facility];  Service: Urology    WI CYSTOURETHROSCOPY,BIOPSY N/A 2016    Procedure: CYSTOSCOPY WITH BIOPSIES;  Surgeon: Benny Rhoades MD;  [facility];  Service: Urology    WI CYSTOURETHROSCOPY,FULGUR <0 5 CM LESN N/A 2020    Procedure: CYSTO W/BIOPSIES, transurethral prostate bx;  Surgeon: Augusta Bowie MD;  [facility];  Service: Urology    WI Dinah Navassa 3RD+ ORD SLCTV ABDL PEL/LXTR [facility] Left 2021    Procedure: LEG angiogram, CO2 w/limited contrast with balloon angioplasty postertior tibial artery;  Surgeon: Clint Goldstein MD;  [facility];  Service: Vascular    ROTATOR CUFF REPAIR      SMALL INTESTINE SURGERY      Surgery Shaji-en-Y    SPINAL FUSION      lumbar and cervical fusions    VAC DRESSING APPLICATION Right     Procedure: APPLICATION VAC DRESSING;  Surgeon: Therese Juares DPM;  [facility];  Service:     WOUND DEBRIDEMENT Left 2021    Procedure: FOOT DEBRIDE, [address] OUT w/graft application;  Surgeon: Therese Juares DPM;  [facility];  Service: Podiatry       Family History   Problem Relation Age of Onset    Diabetes Mother     Heart disease Mother     Other Mother         High blood pressure    Heart disease Father     Diabetes Sister     Other Sister         High blood pressure    Kidney disease Sister     Heart disease Brother     Other Brother         High blood pressure     I have reviewed and agree with the history as documented      E-Cigarette/Vaping    E-Cigarette Use Never User      E-Cigarette/Vaping Substances    Nicotine No     THC No     CBD No     Flavoring No     Other No     Unknown No      Social History     Tobacco Use    Smoking status: Former Smoker     Quit date: 1970     Years since quittin 2    Smokeless tobacco: Never Used   Substance Use Topics    Alcohol use: Not Currently     Frequency: 2-3 times a week     Drinks per session: 1 or 2     Binge frequency: Never Comment: beer / liquor    Drug use: Not Currently     Types: Marijuana     Comment: quit 2019 had medical marijuana       Review of Systems   Constitutional: Negative for appetite change, fatigue and fever  HENT: Negative for rhinorrhea and sore throat  Respiratory: Negative for cough, shortness of breath and wheezing  Cardiovascular: Negative for chest pain and leg swelling  Gastrointestinal: Negative for abdominal pain, diarrhea and vomiting  Genitourinary: Negative for dysuria and flank pain  Musculoskeletal: Negative for back pain, joint swelling and neck pain  Skin: Negative for rash  Neurological: Negative for syncope and headaches  Psychiatric/Behavioral:        Mood normal       Physical Exam  Physical Exam  Vitals signs and nursing note reviewed  Constitutional:       Appearance: He is well-developed  HENT:      Head: Normocephalic and atraumatic  Neck:      Musculoskeletal: Normal range of motion and neck supple  Cardiovascular:      Rate and Rhythm: Normal rate and regular rhythm  Pulmonary:      Effort: Pulmonary effort is normal       Breath sounds: Normal breath sounds  Abdominal:      Palpations: Abdomen is soft  Tenderness: There is no abdominal tenderness  Musculoskeletal: Normal range of motion  Comments: R medial ankle (posterior to malleolus) tenderness to light touch, no redness, no open wounds, no bony tenderness, pulses 2/4, decreased sensation of toes (chronic), FROM with some discomfort   Skin:     General: Skin is warm and dry  Neurological:      Mental Status: He is alert and oriented to person, place, and time           Vital Signs  ED Triage Vitals [03/04/21 0620]   Temperature Pulse Respirations Blood Pressure SpO2   97 9 °F (36 6 °C) 100 20 146/87 100 %      Temp Source Heart Rate Source Patient Position - Orthostatic VS BP Location FiO2 (%)   Oral Monitor Sitting Right arm --      Pain Score       9           Vitals:    03/04/21 6517 03/04/21 0812   BP: 146/87 154/82   Pulse: 100 78   Patient Position - Orthostatic VS: Sitting Lying         Visual Acuity      ED Medications  Medications   acetaminophen (TYLENOL) tablet 975 mg (975 mg Oral Given 3/4/21 0706)       Diagnostic Studies  Results Reviewed     None                 XR ankle 3+ views RIGHT   Final Result by Otis Barber MD (03/04 2359)      No acute osseous abnormality  Workstation performed: [id number]         VAS lower limb venous duplex study, unilateral/limited    (Results Pending)              Procedures  Procedures         ED Course                             SBIRT 22yo+      Most Recent Value   SBIRT (22 yo +)   In order to provide better care to our patients, we are screening all of our patients for alcohol and drug use  Would it be okay to ask you these screening questions? Yes Filed at: 03/04/2021 8325   Initial Alcohol Screen: US AUDIT-C    1  How often do you have a drink containing alcohol?  0 Filed at: 03/04/2021 0620   2  How many drinks containing alcohol do you have on a typical day you are drinking? 0 Filed at: 03/04/2021 0620   3b  FEMALE Any Age, or MALE 65+: How often do you have 4 or more drinks on one occassion? 0 Filed at: 03/04/2021 2535   Audit-C Score  0 Filed at: 03/04/2021 3966   MARIELLE: How many times in the past year have you    Used an illegal drug or used a prescription medication for non-medical reasons? Never Filed at: 03/04/2021 0620                    MDM  Number of Diagnoses or Management Options  Ankle strain, right, initial encounter:      Amount and/or Complexity of Data Reviewed  Tests in the radiology section of CPT®: ordered and reviewed    Risk of Complications, Morbidity, and/or Mortality  Presenting problems: moderate  General comments: RLE doppler neg  For dvt    Ace wrap applied to R ankle/foot, +n/v intact        Disposition  Final diagnoses:    Ankle strain, right, initial encounter     Time reflects when diagnosis was documented in both MDM as applicable and the Disposition within this note     Time User Action Codes Description Comment    3/4/2021  7:55 AM JoyceLorena Add [L07 360V] Ankle strain, right, initial encounter       ED Disposition     ED Disposition Condition Date/Time Comment    Discharge Stable u Mar 4, 2021  7:55 AM Joshua Woodard discharge to home/self care  Follow-up Information     Follow up With Specialties Details Why Heron Arevalo [address], MD Internal Medicine  and your podiatrist/vascular dr Ken Montes  [address] [facility]  [address] [place] [address]            Discharge Medication List as of 3/4/2021  7:59 AM      START taking these medications    Details   oxyCODONE (OxyCONTIN) 10 mg 12 hr tablet Take 1 tablet (10 mg total) by mouth every 12 (twelve) hours as needed for severe pain for up to 10 daysMax Daily Amount: 20 mg, Starting Thu 3/4/2021, Until Sun 3/14/2021, Normal         CONTINUE these medications which have NOT CHANGED    Details   ezetimibe (ZETIA) 10 mg tablet 1 tablet, Starting Fri 1/22/2021, Historical Med      acetaminophen (TYLENOL) 325 mg tablet Take 650 mg by mouth every 6 (six) hours as needed for mild pain, Historical Med      ALPRAZolam (XANAX) 0 25 mg tablet At twice a day as needed for anxiety, Normal      aspirin 81 mg chewable tablet Chew 1 tablet (81 mg total) daily, Starting Thu 1/7/2021, Until Thu 2/25/2021, No Print      Azelastine HCl 0 15 % SOLN Inhale 1 spray 2 (two) times a day, Starting Thu 5/14/2020, Normal      bismuth subsalicylate (PEPTO BISMOL) 262 MG chewable tablet Chew 524 mg daily as needed for indigestion  , Historical Med      calcitriol (ROCALTROL) 0 25 mcg capsule Take by mouth daily , Starting Tue 9/8/2020, Historical Med      Cholecalciferol (Vitamin D3) 1 25 MG (59939 UT) CAPS TAKE 1 CAPSULE BY MOUTH EVERY 30 DAYS, Normal      docusate sodium (COLACE) 100 mg capsule Take 1 capsule (100 mg total) by mouth 2 (two) times a day, Starting Wed 1/6/2021, No Print      Ferrous Sulfate Dried (Feosol) 200 (65 Fe) MG TABS Take 65 mg by mouth daily, Starting Tue 3/2/2021, Until Mon 5/31/2021, Normal      fluticasone (FLONASE) 50 mcg/act nasal spray One spray in each nostril twice a day, Normal      furosemide (LASIX) 20 mg tablet TAKE 1 TABLET BY MOUTH AS NEEDED FOR EDEMA, Normal      gabapentin (NEURONTIN) 300 mg capsule Take 1 capsule (300 mg total) by mouth daily at bedtime, Starting Thu 2/25/2021, Until Sun 2/20/2022, Normal      gabapentin (NEURONTIN) 600 MG tablet Take 300 mg by mouth 3 (three) times a day, Historical Med      Insulin Pen Needle 31G X 8 MM MISC Inject 3 times a day, Normal      isosorbide mononitrate (IMDUR) 30 mg 24 hr tablet Take 3 tablets (90 mg total) by mouth daily, Starting Thu 1/7/2021, Until Thu 2/25/2021, No Print      Lantus SoloStar 100 units/mL injection pen 15 units qhs, No Print      latanoprost (XALATAN) 0 005 % ophthalmic solution Administer 1 drop to both eyes daily at bedtime, Historical Med      metoprolol succinate (TOPROL-XL) 100 mg 24 hr tablet Take 1 tablet (100 mg total) by mouth daily, Starting Thu 1/7/2021, Until Thu 2/25/2021, No Print      Mirabegron ER (Myrbetriq) 50 MG TB24 Take by mouth daily , Historical Med      multivitamin (THERAGRAN) TABS Take 1 tablet by mouth daily  , Historical Med      NovoLOG FlexPen 100 units/mL injection pen 10 units with each meal , Normal      pantoprazole (PROTONIX) 40 mg tablet Take 1 tablet (40 mg total) by mouth daily in the early morning, Starting Thu 1/7/2021, Until Thu 2/25/2021, No Print      sertraline (ZOLOFT) 50 mg tablet Take 1 tablet (50 mg total) by mouth daily, Starting Thu 7/16/2020, Until Thu 2/25/2021, Normal      tamsulosin (FLOMAX) 0 4 mg Take 1 capsule (0 4 mg total) by mouth daily at bedtime, Starting Wed 1/6/2021, Until Thu 2/25/2021, No Print           No discharge procedures on file      PDMP Review       Value Time User PDMP Reviewed  Yes 7/16/2020  8:50 AM Daina Marks MD          ED Provider  Electronically Signed by           Kevyn Abdi MD  03/04/21 9024

## 2021-03-11 ENCOUNTER — OFFICE VISIT (OUTPATIENT)
Dept: VASCULAR SURGERY | Facility: CLINIC | Age: 76
End: 2021-03-11
Payer: MEDICARE

## 2021-03-11 VITALS
WEIGHT: 234 LBS | BODY MASS INDEX: 28.49 KG/M2 | HEART RATE: 98 BPM | RESPIRATION RATE: 17 BRPM | TEMPERATURE: 97.6 F | HEIGHT: 76 IN | SYSTOLIC BLOOD PRESSURE: 150 MMHG | DIASTOLIC BLOOD PRESSURE: 90 MMHG

## 2021-03-11 DIAGNOSIS — I73.9 PAD (PERIPHERAL ARTERY DISEASE) (HCC): ICD-10-CM

## 2021-03-11 DIAGNOSIS — M79.605 LEG PAIN, DIFFUSE, LEFT: Primary | ICD-10-CM

## 2021-03-11 PROCEDURE — 1124F ACP DISCUSS-NO DSCNMKR DOCD: CPT | Performed by: SURGERY

## 2021-03-11 PROCEDURE — 99213 OFFICE O/P EST LOW 20 MIN: CPT | Performed by: SURGERY

## 2021-03-11 RX ORDER — AMOXICILLIN 500 MG/1
CAPSULE ORAL
COMMUNITY
Start: 2021-03-10 | End: 2021-10-12

## 2021-03-11 NOTE — PROGRESS NOTES
Assessment/Plan:    PAD (peripheral artery disease) ([facility])  PAD with nonhealing left foot amputation site s/p left lower extremity angiogram x 2 with successful posterior tibial atherectomy and angioplasty and subsequent revision of left foot amputation site  The wound is healing appropriately and appears clean/dry today  However, he is complaining of worsening burning and numbness of the calf radiating down to the medial ankle since the procedure  He is taking gabapentin and has tried tylenol/oxycontin without relief  He cannot take NSAIDS due to previous gastric bypass  Left foot doppler pedal signals in the DP/PT are patent and triphasic/biphasic  There is good capillary refill and normal motor/sensation    -We discussed the pathophysiology of arterial occlusive disease, available treatment options and indications for treatment/continued surveillance  No signs of acute arterial ischemia  Unclear etiology for symptoms though may be having residual sensitivity from endovascular intervention   -Continue medical optimization  Currently on ASA  -Will repeat arterial duplex to ensure no obvious abnormality but do not suspect much change from post-intervention duplex based on clinical exam  -Recent left leg venous duplex did not demonstrate any significant abnormality  -Will refer to pain management for assistance with pain control given intolerable symptoms uncontrolled by current pain regimen         Diagnoses and all orders for this visit:    Leg pain, diffuse, left  -     Ambulatory referral to Pain Management; Future    PAD (peripheral artery disease) ([facility])  -     VAS lower limb arterial duplex, limited/unilateral; Future    Other orders  -     amoxicillin (AMOXIL) 500 mg capsule        I have spent 15 minutes with Patient  today in which greater than 50% of this time was spent in counseling/coordination of care regarding Intructions for management, Importance of tx compliance and Impressions      Subjective: Patient ID: Elizabeth Ramírez is a 64 y o  male  Patient presents in office for PAD  Patient had an Agram on 02/11/2021 by Dr Lieutenant Junior  Patient had a LEV done on 03/04/2021  Patient c/o of having pain in the calf and ankle area as well as neuropathy pain for the last week  Patient describes the pain as a shooting pain that's darryn's all the time  Patient is currently taking ASA 81 and as former smoker  HPI  Mr Rubén Dill is a 66yo male with PAD and nonhealing left foot amputation site s/p left leg angiogram x 2 with posterior tibial artery atherectomy and angioplasty with subsequent revision of left foot amputation site  The left foot wound is healing well but he has had persistent burning pain along the calf radiating into the medial ankle since the procedure which has been getting worse and is intolerable over the last few days  He has no external signs of bruising, bleeding or ischemic changes  He is motor intact  There is good color in his foot  He is taking ASA only and cannot tolerate statins  He has been taking intermittent oxycontin that he had leftover as well as tylenol and gabapentin with no relief  He is desperate for help and his symptoms are intolerable  He denies any similar symptoms on the contralateral extremity  The following portions of the patient's history were reviewed and updated as appropriate: allergies, current medications, past family history, past medical history, past social history, past surgical history and problem list     Review of Systems   Constitutional: Negative  HENT: Negative  Eyes: Negative  Respiratory: Negative  Cardiovascular: Negative  Gastrointestinal: Negative  Endocrine: Negative  Genitourinary: Negative  Musculoskeletal: Positive for neck pain  Skin: Positive for wound (Left leg)  Allergic/Immunologic: Negative  Neurological: Negative  Hematological: Negative  Psychiatric/Behavioral: Negative          I have personally reviewed the ROS entered by MA and agree as documented  Objective:      /90 (BP Location: Left arm, Patient Position: Sitting, Cuff Size: Adult)   Pulse 98   Temp 97 6 °F (36 4 °C) (Tympanic)   Resp 17   Ht 6' 4" (1 93 m)   Wt 106 kg (234 lb)   BMI 28 48 kg/m²          Physical Exam  Constitutional:       Appearance: Normal appearance  HENT:      Head: Normocephalic and atraumatic  Neck:      Musculoskeletal: Normal range of motion  Cardiovascular:      Rate and Rhythm: Normal rate  Pulses:           Dorsalis pedis pulses are detected w/ Doppler on the left side  Posterior tibial pulses are detected w/ Doppler on the left side  Comments: Biphasic/triphasic left foot doppler signals  Pulmonary:      Effort: Pulmonary effort is normal    Abdominal:      General: There is no distension  Palpations: Abdomen is soft  Tenderness: There is no abdominal tenderness  Musculoskeletal: Normal range of motion  Skin:     General: Skin is warm and dry  Capillary Refill: Capillary refill takes less than 2 seconds  Comments: Left foot steristrips c/d/i, wound appears to be healing underneath   Neurological:      General: No focal deficit present  Mental Status: He is alert and oriented to person, place, and time  Psychiatric:         Mood and Affect: Mood normal          Behavior: Behavior normal          Thought Content:  Thought content normal          Judgment: Judgment normal

## 2021-03-11 NOTE — LETTER
March 11, 2021     Salma Simmons MD  [address] [facility]    Patient: Christin Peña   YOB: 1945   Date of Visit: 3/11/2021       Dear Dr Garry Osborne: Thank you for referring Mundo Dunbar to me for evaluation  Below are the relevant portions of my assessment and plan of care  Diagnoses and all orders for this visit:    PAD (peripheral artery disease) ([address] )  PAD with nonhealing left foot amputation site s/p left lower extremity angiogram x 2 with successful posterior tibial atherectomy and angioplasty and subsequent revision of left foot amputation site  The wound is healing appropriately and appears clean/dry today  However, he is complaining of worsening burning and numbness of the calf radiating down to the medial ankle since the procedure  He is taking gabapentin and has tried tylenol/oxycontin without relief  He cannot take NSAIDS due to previous gastric bypass      Left foot doppler pedal signals in the DP/PT are patent and triphasic/biphasic  There is good capillary refill and normal motor/sensation     -We discussed the pathophysiology of arterial occlusive disease, available treatment options and indications for treatment/continued surveillance  No signs of acute arterial ischemia  Unclear etiology for symptoms though may be having residual sensitivity from endovascular intervention   -Continue medical optimization  Currently on ASA  -Will repeat arterial duplex to ensure no obvious abnormality but do not suspect much change from post-intervention duplex based on clinical exam  -Recent left leg venous duplex did not demonstrate any significant abnormality  -Will refer to pain management for assistance with pain control given intolerable symptoms uncontrolled by current pain regimen      If you have questions, please do not hesitate to call me  I look forward to following Heidi Screen along with you           Sincerely,        Gisselle White MD        CC: No Recipients

## 2021-03-12 NOTE — PATIENT INSTRUCTIONS
PAD (peripheral artery disease) (HCC)  PAD with nonhealing left foot amputation site s/p left lower extremity angiogram x 2 with successful posterior tibial atherectomy and angioplasty and subsequent revision of left foot amputation site  The wound is healing appropriately and appears clean/dry today  However, he is complaining of worsening burning and numbness of the calf radiating down to the medial ankle since the procedure  He is taking gabapentin and has tried tylenol/oxycontin without relief  He cannot take NSAIDS due to previous gastric bypass      Left foot doppler pedal signals in the DP/PT are patent and triphasic/biphasic  There is good capillary refill and normal motor/sensation     -We discussed the pathophysiology of arterial occlusive disease, available treatment options and indications for treatment/continued surveillance  No signs of acute arterial ischemia  Unclear etiology for symptoms though may be having residual sensitivity from endovascular intervention   -Continue medical optimization  Currently on ASA    -Will repeat arterial duplex to ensure no obvious abnormality but do not suspect much change from post-intervention duplex based on clinical exam  -Recent left leg venous duplex did not demonstrate any significant abnormality  -Will refer to pain management for assistance with pain control given intolerable symptoms uncontrolled by current pain regimen

## 2021-03-12 NOTE — ASSESSMENT & PLAN NOTE
PAD with nonhealing left foot amputation site s/p left lower extremity angiogram x 2 with successful posterior tibial atherectomy and angioplasty and subsequent revision of left foot amputation site  The wound is healing appropriately and appears clean/dry today  However, he is complaining of worsening burning and numbness of the calf radiating down to the medial ankle since the procedure  He is taking gabapentin and has tried tylenol/oxycontin without relief  He cannot take NSAIDS due to previous gastric bypass  Left foot doppler pedal signals in the DP/PT are patent and triphasic/biphasic  There is good capillary refill and normal motor/sensation    -We discussed the pathophysiology of arterial occlusive disease, available treatment options and indications for treatment/continued surveillance  No signs of acute arterial ischemia  Unclear etiology for symptoms though may be having residual sensitivity from endovascular intervention   -Continue medical optimization  Currently on ASA    -Will repeat arterial duplex to ensure no obvious abnormality but do not suspect much change from post-intervention duplex based on clinical exam  -Recent left leg venous duplex did not demonstrate any significant abnormality  -Will refer to pain management for assistance with pain control given intolerable symptoms uncontrolled by current pain regimen

## 2021-03-18 ENCOUNTER — IMMUNIZATIONS (OUTPATIENT)
Dept: FAMILY MEDICINE CLINIC | Facility: HOSPITAL | Age: 76
End: 2021-03-18

## 2021-03-18 DIAGNOSIS — Z23 ENCOUNTER FOR IMMUNIZATION: Primary | ICD-10-CM

## 2021-03-18 PROCEDURE — 91300 SARS-COV-2 / COVID-19 MRNA VACCINE (PFIZER-BIONTECH) 30 MCG: CPT

## 2021-03-18 PROCEDURE — 0002A SARS-COV-2 / COVID-19 MRNA VACCINE (PFIZER-BIONTECH) 30 MCG: CPT

## 2021-03-23 ENCOUNTER — TELEPHONE (OUTPATIENT)
Dept: INTERNAL MEDICINE CLINIC | Facility: CLINIC | Age: 76
End: 2021-03-23

## 2021-03-23 ENCOUNTER — OFFICE VISIT (OUTPATIENT)
Dept: ENDOCRINOLOGY | Facility: HOSPITAL | Age: 76
End: 2021-03-23
Payer: MEDICARE

## 2021-03-23 VITALS
HEART RATE: 64 BPM | SYSTOLIC BLOOD PRESSURE: 88 MMHG | HEIGHT: 76 IN | DIASTOLIC BLOOD PRESSURE: 62 MMHG | WEIGHT: 241.8 LBS | BODY MASS INDEX: 29.45 KG/M2

## 2021-03-23 DIAGNOSIS — Z79.4 TYPE 2 DIABETES MELLITUS WITH COMPLICATION, WITH LONG-TERM CURRENT USE OF INSULIN (HCC): Primary | ICD-10-CM

## 2021-03-23 DIAGNOSIS — E78.5 HYPERLIPIDEMIA, UNSPECIFIED HYPERLIPIDEMIA TYPE: ICD-10-CM

## 2021-03-23 DIAGNOSIS — I10 ESSENTIAL HYPERTENSION: ICD-10-CM

## 2021-03-23 DIAGNOSIS — E11.8 TYPE 2 DIABETES MELLITUS WITH COMPLICATION, WITH LONG-TERM CURRENT USE OF INSULIN (HCC): Primary | ICD-10-CM

## 2021-03-23 PROCEDURE — 99214 OFFICE O/P EST MOD 30 MIN: CPT | Performed by: PHYSICIAN ASSISTANT

## 2021-03-23 NOTE — PATIENT INSTRUCTIONS
Continue monitor diet, maintain physical activity  Continue with Lantus 17 units at night, and NovoLog 15 units with each meal       Continue utilizing dexcom to monitor blood sugar  Call the office if any concerns for low blood sugar  Continue with Zetia for cholesterol  Follow-up in 3 months with lab work completed prior to visit

## 2021-03-23 NOTE — TELEPHONE ENCOUNTER
Phone call from Kristofer:  He is in [place] at an Endocrinology appt  His BP is 88/62  He has had episodes of passing out and wants to know what to do  He also stated he has a heart monitor on (ordered by Cardiologist)  He took Lasix, Isosorbide and Metoprolol this morning    He was instructed to have someone pick him up (DO NOT DRIVE) and call his cardiologist

## 2021-03-23 NOTE — TELEPHONE ENCOUNTER
I call and left a message in the answer in [place] and no our nurse Fernanda Cam talk to him with my directions I want a record that I told him that his isosorbide mononitrate dose may need to be adjusted   The metoprolol succinate 100 milligrams may need to be cut in half   Lasix may need to be cut especially if he is hypotensive but most important call Cardiology so they can interview the event recorder that he has on to make sure he does not have any Larry he or tachy arrhythmias    And to have somebody drive him home he is now with the endocrinologist

## 2021-03-23 NOTE — PROGRESS NOTES
Rhonda Razo 64 y o  male MRN: [id number]    Encounter: [id number]      Assessment/Plan     Assessment: This is a 64y o -year-old male with  Type 2 diabetes with hypertension and hyperlipidemia  Plan:    1  Type 2 diabetes:  Most recent hemoglobin A1c was 7 0  This is excellent for his age and chronic medical conditions  Did discuss with him concerns of A1c being too low and concerns of hypoglycemia specially with his current syncopal episodes  At this time continue with Lantus 17 units at night and NovoLog 15 units with each meal   Please contact the office if there is any concerns with consistent hypoglycemia  Continue utilizing Dexcom to monitor glucose levels  Follow-up in 3 months with lab work completed prior to visit  2  Hypertension:  Blood pressure on the low end of normal, but is asymptomatic  Has been having syncopal episodes, and is currently wearing a Holter monitor  Will contact PCP and Cardiology for further recommendations  Advise him to wait in the waiting room until further directions are given by other providers     3  Hyperlipidemia:  Reviewed recent lipid panel  Cholesterol levels are excellent  Continue with Zetia at this time  Will repeat lab work prior to next office visit  CC:  Type 2 diabetes follow-up    History of Present Illness     HPI:  Rhonda Razo is a 66y o  year old male with type 2 diabetes for about 25 years  He is on insulin at home and takes Novolog 15 units with each meal,  Lantus 17 units q h s  He denies any polyuria, polydipsia, nocturia and blurry vision  Diabetes is complicated by history of peripheral neuropathy and he also has history of foot infection back in 2016  He has a history of CKD and follows closely with Nephrology  Since last visit had amputation of 5th digit of left foot due to osteomyelitis  Is following up with podiatry  No complications at this time  He has also been experiencing syncopal episodes     States that he does not have any symptoms when these episodes occur  States he has not been noticing any significant episodes of hypoglycemia on his Dexcom  Is currently wearing a Holter monitor  Is having low blood pressure in the office, but denies any lightheadedness, dizziness, weakness, chest pain, palpitations      Hypoglycemic episodes: Yes occurring at random times and occurs quickly  He recently acquired a Dexcom       The patient's last eye exam was in June of 2020  The patient's last foot exam was in October 2020      Blood Sugar/Glucometer/Pump/CGM review: We are unable to download his Dexcom for review today      In the past for treatment of hyperlipidemia has been intolerant to multiple statins  Review of Systems   Constitutional: Negative for activity change, appetite change, fatigue and unexpected weight change  HENT: Negative for trouble swallowing  Eyes: Negative for visual disturbance  Respiratory: Negative for chest tightness and shortness of breath  Cardiovascular: Negative for chest pain, palpitations and leg swelling  Gastrointestinal: Negative for abdominal pain, diarrhea, nausea and vomiting  Endocrine: Negative for cold intolerance, heat intolerance, polydipsia, polyphagia and polyuria  Genitourinary: Negative for frequency  Skin: Positive for wound  Negative for rash  Neurological: Positive for syncope  Negative for dizziness, weakness, light-headedness, numbness and headaches  Psychiatric/Behavioral: Negative for dysphoric mood and sleep disturbance  The patient is not nervous/anxious          Historical Information   Past Medical History:   Diagnosis Date    Anemia     Last assessed: 9/28/17    Anxiety     Arteriosclerotic cardiovascular disease     Last assessed: 9/28/17    Arthritis     Bladder cancer ([facility] [address] )     bladder- had BCG treatments    Chronic kidney disease     CKD (chronic kidney disease) stage 4, GFR 15-29 ml/min ([facility])     Colon polyp     Coronary artery disease     7 stents    Depression     Diabetes mellitus ([facility] [address] )     IDDM    GERD (gastroesophageal reflux disease)     Glaucoma     History of fusion of cervical spine     Hyperlipidemia     Hypertension     Insomnia     Last assessed: 11/14/12    Loss of hearing     has hearing aids but usually does not wear them    Other seasonal allergic rhinitis     Last assessed: 2/10/16    [facility]AD [facility]peripheral artery disease) ([facility])     Shortness of breath     on exertion    Spinal stenosis of lumbar region     Transient cerebral ischemia     Uses walker     w/c for longer distances     Past Surgical History:   Procedure Laterality Date    CARDIAC SURGERY      Cath stent placement  Last assessed: 3/9/17  Interventional Catheterization    CHOLECYSTECTOMY      COLONOSCOPY      CYSTOSCOPY      Diagnostic w/biopsy  Tereso Bennett  Last assessed: 12/1/14    CYSTOURETHROSCOPY      w/cautery  Tereso Bennett    GASTRIC BYPASS      For morbid obesity w/Shaji-en-Y   Resolved: 11/17/09    INCISION AND DRAINAGE OF WOUND Right 2/26/2017    Procedure: INCISION AND DRAINAGE (I&D) EXTREMITY WITH APPLICATION OF GRAFT JACKET;  Surgeon: Cintia Fierro DPM;  [facility];  Service:     INCISION AND DRAINAGE OF WOUND Right 4/25/2017    Procedure: INCISION AND DRAINAGE (I&D) EXTREMITY, APPLICATION OF GRAFT;  Surgeon: Cintia Fierro DPM;  [facility];  Service:     IR BIOPSY OTHER  7/2/2020    IR LOWER EXTREMITY ANGIOGRAM  2/8/2021    IR LOWER EXTREMITY ANGIOGRAM  2/11/2021    IR TUNNELED CENTRAL LINE PLACEMENT  12/24/2020    JOINT REPLACEMENT      christofer knees replaced    NJ AMPUTATION METATARSAL+TOE,SINGLE Left 12/21/2020    Procedure: RAY RESECTION FOOT;  Surgeon: Radha Rivers DPM;  [facility];  Service: Podiatry    NJ AMPUTATION METATARSAL+TOE,SINGLE Left 12/31/2020    Procedure: 5TH MET RESECTION;  Surgeon: Radha Rivers DPM;  [facility];  Service: Podiatry    NJ CYSTOURETHROSCOPY W/IRRIG & EVAC CLOTS N/A 2/10/2021    Procedure: CYSTOSCOPY EVACUATION OF CLOTS, fulguration;  Surgeon: Jacy Allen MD;  [facility];  Service: Urology    TX CYSTOURETHROSCOPY,BIOPSY N/A 2016    Procedure: Jarad Stanton;  Surgeon: Behzad Henry MD;  [facility];  Service: Urology    TX CYSTOURETHROSCOPY,FULGUR <0 5 CM LESN N/A 2020    Procedure: CYSTO W/BIOPSIES, transurethral prostate bx;  Surgeon: Chet Paul MD;  [facility];  Service: Urology    TX Brittney Duran 3RD+ ORD SLCTV ABDL PEL/LXTR [facility] Left 2021    Procedure: LEG angiogram, CO2 w/limited contrast with balloon angioplasty postertior tibial artery;  Surgeon: Simin Parsons MD;  [facility];  Service: Vascular    ROTATOR CUFF REPAIR      SMALL INTESTINE SURGERY      Surgery Shaji-en-Y    SPINAL FUSION      lumbar and cervical fusions    VAC DRESSING APPLICATION Right     Procedure: APPLICATION VAC DRESSING;  Surgeon: Ti Corrales DPM;  [facility];  Service:    [address] [place] [address] Left 2021    Procedure: FOOT DEBRIDE, [address] OUT w/graft application;  Surgeon: Ti Corrales DPM;  [facility];  Service: Podiatry     Social History   Social History     Substance and Sexual Activity   Alcohol Use Not Currently    Frequency: 2-3 times a week    Drinks per session: 1 or 2    Binge frequency: Never    Comment: beer / liquor     Social History     Substance and Sexual Activity   Drug Use Not Currently    Types: Marijuana    Comment: quit 2019 had medical marijuana     Social History     Tobacco Use   Smoking Status Former Smoker    Quit date: 1970    Years since quittin 2   Smokeless Tobacco Never Used     Family History:   Family History   Problem Relation Age of Onset    Diabetes Mother     Heart disease Mother     Other Mother         High blood pressure    Heart disease Father     Diabetes Sister     Other Sister High blood pressure    Kidney disease Sister     Heart disease Brother     Other Brother         High blood pressure       Meds/Allergies   Current Outpatient Medications   Medication Sig Dispense Refill    acetaminophen (TYLENOL) 325 mg tablet Take 650 mg by mouth every 6 (six) hours as needed for mild pain      ALPRAZolam (XANAX) 0 25 mg tablet At twice a day as needed for anxiety 30 tablet 0    Azelastine HCl 0 15 % SOLN Inhale 1 spray 2 (two) times a day (Patient taking differently: Inhale 1 spray 2 (two) times a day as needed ) 30 mL 3    Bimatoprost (LUMIGAN OP) Apply to eye      bismuth subsalicylate (PEPTO BISMOL) 262 MG chewable tablet Chew 524 mg daily as needed for indigestion        calcitriol (ROCALTROL) 0 25 mcg capsule Take by mouth daily       Cholecalciferol (Vitamin D3) 1 25 MG (10215 UT) CAPS TAKE 1 CAPSULE BY MOUTH EVERY 30 DAYS 12 capsule 0    docusate sodium (COLACE) 100 mg capsule Take 1 capsule (100 mg total) by mouth 2 (two) times a day 10 capsule 0    ezetimibe (ZETIA) 10 mg tablet 1 tablet      Ferrous Sulfate Dried (Feosol) 200 (65 Fe) MG TABS Take 65 mg by mouth daily 30 each 1    fluticasone (FLONASE) 50 mcg/act nasal spray One spray in each nostril twice a day 1 Bottle 3    furosemide (LASIX) 20 mg tablet TAKE 1 TABLET BY MOUTH AS NEEDED FOR EDEMA 90 tablet 1    gabapentin (NEURONTIN) 300 mg capsule Take 1 capsule (300 mg total) by mouth daily at bedtime 90 capsule 3    gabapentin (NEURONTIN) 600 MG tablet Take 300 mg by mouth 3 (three) times a day      Insulin Pen Needle 31G X 8 MM MISC Inject 3 times a day 100 each 2    Lantus SoloStar 100 units/mL injection pen 15 units qhs (Patient taking differently: 17 units qhs) 10 pen 1    metoprolol succinate (TOPROL-XL) 100 mg 24 hr tablet Take 1 tablet (100 mg total) by mouth daily 30 tablet 0    Mirabegron ER (Myrbetriq) 50 MG TB24 Take by mouth daily       multivitamin (THERAGRAN) TABS Take 1 tablet by mouth daily       NovoLOG FlexPen 100 units/mL injection pen 10 units with each meal  (Patient taking differently: 15 units with each meal ) 5 pen 1    OXYCODONE HCL ER PO Take by mouth      pantoprazole (PROTONIX) 40 mg tablet Take 1 tablet (40 mg total) by mouth daily in the early morning 30 tablet 0    sertraline (ZOLOFT) 50 mg tablet Take 1 tablet (50 mg total) by mouth daily 30 tablet 3    tamsulosin (FLOMAX) 0 4 mg Take 1 capsule (0 4 mg total) by mouth daily at bedtime 30 capsule 0    amoxicillin (AMOXIL) 500 mg capsule       aspirin 81 mg chewable tablet Chew 1 tablet (81 mg total) daily 30 tablet 0    isosorbide mononitrate (IMDUR) 30 mg 24 hr tablet Take 3 tablets (90 mg total) by mouth daily (Patient not taking: Reported on 3/11/2021) 90 tablet 0    latanoprost (XALATAN) 0 005 % ophthalmic solution Administer 1 drop to both eyes daily at bedtime       No current facility-administered medications for this visit  Allergies   Allergen Reactions    Atorvastatin Hives, Itching and Rash    Simvastatin Rash and Edema     "ALL STATINS"    Statins Itching    Insulin Lispro Swelling and Edema    Medical Tape      rash to electrodes    Other Itching, Rash and Other (See Comments)     rash to electrodes and adhesives       Objective   Vitals: Blood pressure (!) 88/62, pulse 64, height 6' 4" (1 93 m), weight 110 kg (241 lb 12 8 oz)  Physical Exam  Vitals signs and nursing note reviewed  Constitutional:       General: He is not in acute distress  Appearance: Normal appearance  He is not diaphoretic  HENT:      Head: Normocephalic and atraumatic  Eyes:      General: No scleral icterus  Extraocular Movements: Extraocular movements intact  Conjunctiva/sclera: Conjunctivae normal       Pupils: Pupils are equal, round, and reactive to light  Neck:      Musculoskeletal: Normal range of motion  Cardiovascular:      Rate and Rhythm: Normal rate and regular rhythm  Heart sounds:  No murmur  Pulmonary:      Effort: Pulmonary effort is normal  No respiratory distress  Breath sounds: Normal breath sounds  No wheezing  Musculoskeletal:      Right lower leg: No edema  Left lower leg: No edema  Comments: Left foot currently wrapped  Lymphadenopathy:      Cervical: No cervical adenopathy  Skin:     General: Skin is warm and dry  Neurological:      Mental Status: He is alert and oriented to person, place, and time  Mental status is at baseline  Sensory: No sensory deficit  Gait: Gait abnormal (Uses walker)  Psychiatric:         Mood and Affect: Mood normal          Behavior: Behavior normal          Thought Content: Thought content normal          The history was obtained from the review of the chart, patient      Lab Results:   Lab Results   Component Value Date/Time    Hemoglobin A1C 7 0 (H) 02/04/2021 01:04 PM    Hemoglobin A1C 7 7 (H) 01/11/2021 03:40 AM    Hemoglobin A1C 7 4 (H) 12/18/2020 04:52 PM    Hemoglobin A1C 7 5 (H) 10/16/2020 12:53 PM    WBC 7 56 02/17/2021 05:38 AM    WBC 7 97 02/16/2021 05:27 AM    WBC 7 37 02/15/2021 05:22 AM    Hemoglobin 9 2 (L) 02/17/2021 05:38 AM    Hemoglobin 9 1 (L) 02/16/2021 05:27 AM    Hemoglobin 9 4 (L) 02/15/2021 05:22 AM    Hematocrit 28 5 (L) 02/17/2021 05:38 AM    Hematocrit 28 0 (L) 02/16/2021 05:27 AM    Hematocrit 28 5 (L) 02/15/2021 05:22 AM    MCV 94 02/17/2021 05:38 AM    MCV 94 02/16/2021 05:27 AM    MCV 94 02/15/2021 05:22 AM    Platelets 661 65/70/8636 05:38 AM    Platelets 021 97/59/5823 05:27 AM    Platelets 564 22/72/2694 05:22 AM    BUN 33 (H) 02/17/2021 05:38 AM    BUN 34 (H) 02/16/2021 05:27 AM    BUN 31 (H) 02/15/2021 05:22 AM    Potassium 4 7 02/17/2021 05:38 AM    Potassium 4 2 02/16/2021 05:27 AM    Potassium 4 2 02/15/2021 05:22 AM    Chloride 104 02/17/2021 05:38 AM    Chloride 104 02/16/2021 05:27 AM    Chloride 104 02/15/2021 05:22 AM    CO2 26 02/17/2021 05:38 AM    CO2 26 02/16/2021 05:27 AM CO2 25 02/15/2021 05:22 AM    Creatinine 2 08 (H) 02/17/2021 05:38 AM    Creatinine 2 06 (H) 02/16/2021 05:27 AM    Creatinine 2 02 (H) 02/15/2021 05:22 AM    AST 24 02/04/2021 01:04 PM    AST 25 12/28/2020 05:07 AM    AST 22 12/18/2020 05:05 PM    ALT 47 02/04/2021 01:04 PM    ALT 29 12/28/2020 05:07 AM    ALT 61 12/18/2020 05:05 PM    Albumin 3 6 02/04/2021 01:04 PM    Albumin 2 7 (L) 12/28/2020 05:07 AM    Albumin 3 5 12/18/2020 05:05 PM    HDL, Direct 35 (L) 02/04/2021 01:04 PM    HDL, Direct 43 10/16/2020 12:53 PM    HDL, Direct 38 (L) 06/24/2020 08:44 AM    Triglycerides 121 02/04/2021 01:04 PM    Triglycerides 157 (H) 10/16/2020 12:53 PM    Triglycerides 116 06/24/2020 08:44 AM       Portions of the record may have been created with voice recognition software  Occasional wrong word or "sound a like" substitutions may have occurred due to the inherent limitations of voice recognition software  Read the chart carefully and recognize, using context, where substitutions have occurred

## 2021-03-24 ENCOUNTER — TELEPHONE (OUTPATIENT)
Dept: VASCULAR SURGERY | Facility: CLINIC | Age: 76
End: 2021-03-24

## 2021-03-24 NOTE — TELEPHONE ENCOUNTER
Patient called saying he does not want to see vascular anymore he will follow up with podiatry for his wounds

## 2021-04-08 ENCOUNTER — CONSULT (OUTPATIENT)
Dept: PAIN MEDICINE | Facility: MEDICAL CENTER | Age: 76
End: 2021-04-08
Payer: MEDICARE

## 2021-04-08 VITALS
WEIGHT: 245 LBS | SYSTOLIC BLOOD PRESSURE: 159 MMHG | HEART RATE: 70 BPM | DIASTOLIC BLOOD PRESSURE: 83 MMHG | HEIGHT: 76 IN | BODY MASS INDEX: 29.83 KG/M2 | TEMPERATURE: 97.9 F

## 2021-04-08 DIAGNOSIS — G89.29 CHRONIC BILATERAL LOW BACK PAIN WITHOUT SCIATICA: Primary | ICD-10-CM

## 2021-04-08 DIAGNOSIS — M54.50 CHRONIC BILATERAL LOW BACK PAIN WITHOUT SCIATICA: Primary | ICD-10-CM

## 2021-04-08 DIAGNOSIS — M47.816 LUMBAR SPONDYLOSIS: ICD-10-CM

## 2021-04-08 PROCEDURE — 99204 OFFICE O/P NEW MOD 45 MIN: CPT | Performed by: PHYSICAL MEDICINE & REHABILITATION

## 2021-04-08 RX ORDER — LOPERAMIDE HYDROCHLORIDE 2 MG/1
2 CAPSULE ORAL 4 TIMES DAILY PRN
COMMUNITY

## 2021-04-08 NOTE — PROGRESS NOTES
Assessment  1  Chronic bilateral low back pain without sciatica    2  Lumbar spondylosis        Plan  1  We will schedule the patient for bilateral L3-5 medial branch nerve blocks with intention of moving forward towards radiofrequency ablation if there is an appropriate diagnostic response  The initial blocks will be performed with 2% lidocaine and if an appropriate response is obtained upon review of the patient's pain diary, a confirmatory block will be scheduled with 0 5% bupivacaine  In the office today, we reviewed the nature of facet joint pathology in depth using a spine model  We discussed the approach we would use for the injections and provided literature for home review  The patient understands the risks associated with the procedure including bleeding, infection, tissue injury, and allergic reaction and provided verbal informed consent in the office today  2  At this time the patient's leg symptoms have improved significantly  If these return he should let us know to assist with further treatment options  3  He is awaiting an MRI tomorrow which we will also look for the results early next week  My impressions and treatment recommendations were discussed in detail with the patient who verbalized understanding and had no further questions  Discharge instructions were provided  I personally saw and examined the patient and I agree with the above discussed plan of care  Orders Placed This Encounter   Procedures    FL spine and pain procedure     Standing Status:   Future     Standing Expiration Date:   4/8/2022     Order Specific Question:   Reason for Exam:     Answer:   (B) L3-5 MBB#1     Order Specific Question:   Anticoagulant hold needed?      Answer:   no     New Medications Ordered This Visit   Medications    fluocinonide (LIDEX) 0 05 % cream     Sig: Apply topically 2 (two) times a day    loperamide (IMODIUM) 2 mg capsule     Sig: Take 2 mg by mouth 4 (four) times a day as needed for diarrhea       History of Present Illness    Geovanna Simmons is a 64 y o  male seen in consultation at the request of Dr Estella Bustos regarding leg pain secondary to peripheral arterial disease  At this time the patient's symptoms have improved and he is experiencing mainly axial lower back pain and pain in the upper thoracic region  These are chronic in nature and described as moderate to severe intensity rated as an 8/10  This is nearly constant but worse in the morning and nighttime  This is characterized as a sharp pain in the back  He does describe some lower extremity weakness and occasional dropping of objects  He does ambulate with a cane and also use a walker or wheelchair times for longer distances  Aggravating factors are unknown  Alleviating factors include bending and sitting  No recent lumbar spine imaging is available for review but an MRI of the lumbar spine is scheduled tomorrow  He has not had relief from surgery, physical therapy, exercise, or heat or ice application  Social history negative for tobacco use, marijuana use, or alcohol use  Currently using gabapentin without significant benefit  I have personally reviewed and/or updated the patient's past medical history, past surgical history, family history, social history, current medications, allergies, and vital signs today  Review of Systems   Constitutional: Negative for fever and unexpected weight change  HENT: Positive for hearing loss  Negative for trouble swallowing  Eyes: Positive for pain and redness  Negative for visual disturbance  Respiratory: Negative for shortness of breath and wheezing  Cardiovascular: Positive for leg swelling  Negative for chest pain and palpitations  Gastrointestinal: Negative for constipation, diarrhea, nausea and vomiting  Endocrine: Positive for polydipsia and polyuria  Negative for cold intolerance and heat intolerance     Genitourinary: Positive for difficulty urinating  Negative for frequency  Musculoskeletal: Positive for gait problem, joint swelling and myalgias  Negative for arthralgias  Skin: Positive for wound  Negative for rash  Neurological: Positive for numbness and headaches  Negative for dizziness, seizures, syncope and weakness  Hematological: Does not bruise/bleed easily  Psychiatric/Behavioral: Positive for dysphoric mood  The patient is nervous/anxious  All other systems reviewed and are negative        Patient Active Problem List   Diagnosis    CAD (coronary artery disease)    Carcinoma in situ of bladder    Hypertension with renal disease    Hyperlipidemia    Presence of stent in coronary artery    Type 2 diabetes mellitus, with long-term current use of insulin ([facility])    CKD (chronic kidney disease) stage 3, GFR 30-59 ml/min ([facility])    Primary osteoarthritis of left knee    Malignant tumor of urinary bladder ([facility])    Cystitis due to intravesical BCG administration    Degeneration of lumbar intervertebral disc    Back pain    Arteriosclerosis of artery of extremity ([facility] [address] [facility]    Stable angina pectoris ([facility])    Herpes zoster without complication    Localized edema    Superficial phlebitis    Preop examination    Acute cystitis without hematuria    Secondary renal hyperparathyroidism ([address]    Bladder cancer ([address]    Abnormal MRI, lumbar spine    Diabetic polyneuropathy associated with type 2 diabetes mellitus ([address] )    Dysuria    Diabetic ulcer of left foot associated with type 2 diabetes mellitus, 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 ([facility])    CKD (chronic kidney disease) stage 4, GFR 15-29 ml/min ([facility])    Chronic anemia    Anemia of chronic disease    Preoperative examination    PAD (peripheral artery disease) ([facility])    Left carotid bruit    Gross hematuria    Chronic bronchitis ([facility])    Moderate major depression, single episode ([facility])    Thrombocytopenia ([facility])    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       Past Medical History:   Diagnosis Date    Anemia     Last assessed: 9/28/17    Anxiety     Arteriosclerotic cardiovascular disease     Last assessed: 9/28/17    Arthritis     Bladder cancer ([facility])     bladder- had BCG treatments    Chronic kidney disease     CKD (chronic kidney disease) stage 4, GFR 15-29 ml/min ([facility])     Colon polyp     Coronary artery disease     7 stents    Depression     Diabetes mellitus ([facility])     IDDM    GERD (gastroesophageal reflux disease)     Glaucoma     History of fusion of cervical spine     Hyperlipidemia     Hypertension     Insomnia     Last assessed: 11/14/12    Loss of hearing     has hearing aids but usually does not wear them    Other seasonal allergic rhinitis     Last assessed: 2/10/16    PAD (peripheral artery disease) ([facility])     Shortness of breath     on exertion    Spinal stenosis of lumbar region     Transient cerebral ischemia     Uses walker     w/c for longer distances       Past Surgical History:   Procedure Laterality Date    CARDIAC SURGERY      Cath stent placement  Last assessed: 3/9/17  Interventional Catheterization    CHOLECYSTECTOMY      COLONOSCOPY      CYSTOSCOPY      Diagnostic w/biopsy  Ahmet Saenz  Last assessed: 12/1/14    CYSTOURETHROSCOPY      w/cautery  Ahmet Saenz    GASTRIC BYPASS      For morbid obesity w/Shaji-en-Y   Resolved: 11/17/09    INCISION AND DRAINAGE OF WOUND Right 2/26/2017    Procedure: INCISION AND DRAINAGE (I&D) EXTREMITY WITH APPLICATION OF GRAFT JACKET;  Surgeon: Edin Buchanan DPM;  [facility];  Service:     INCISION AND DRAINAGE OF WOUND Right 4/25/2017    Procedure: INCISION AND DRAINAGE (I&D) EXTREMITY, APPLICATION OF GRAFT;  Surgeon: Edin Buchanan DPM;  [facility];  Service:     IR BIOPSY OTHER  7/2/2020    IR LOWER EXTREMITY ANGIOGRAM  2/8/2021    IR LOWER EXTREMITY ANGIOGRAM  2/11/2021    IR TUNNELED CENTRAL LINE PLACEMENT  12/24/2020    JOINT REPLACEMENT      christofer knees replaced    MI AMPUTATION METATARSAL+TOE,SINGLE Left 12/21/2020    Procedure: RAY RESECTION FOOT;  Surgeon: Ashli Carvajal DPM;  [facility];  Service: Podiatry    MI AMPUTATION METATARSAL+TOE,SINGLE Left 12/31/2020    Procedure: 5TH MET RESECTION;  Surgeon: Ashli Carvajal DPM;  [facility];  Service: Podiatry    MI CYSTOURETHROSCOPY W/IRRIG & EVAC CLOTS N/A 2/10/2021    Procedure: CYSTOSCOPY EVACUATION OF CLOTS, fulguration;  Surgeon: Robin Hutchinson MD;  [facility];  Service: Urology    MI CYSTOURETHROSCOPY,BIOPSY N/A 8/16/2016    Procedure: Ifeoma Roll;  Surgeon: Terence Bocanegra MD;  [facility];  Service: Urology    MI CYSTOURETHROSCOPY,FULGUR <0 5 CM LESN N/A 11/19/2020    Procedure: CYSTO W/BIOPSIES, transurethral prostate bx;  Surgeon: Tc Rivera MD;  [facility];  Service: Urology    MI [address] [facility] 3RD+ ORD SLCTV ABDL PEL[facility] Left 2/8/2021    Procedure: LEG angiogram, CO2 w/limited contrast with balloon angioplasty postertior tibial artery;  Surgeon: Sonia Choudhury MD;  [facility];  Service: Vascular    ROTATOR CUFF REPAIR      SMALL INTESTINE SURGERY      Surgery Shaji-en-Y    SPINAL FUSION      lumbar and cervical fusions    VAC DRESSING APPLICATION Right 5/43/9244    Procedure: APPLICATION VAC DRESSING;  Surgeon: Edin Buchanan DPM;  [facility];  Service:     WOUND DEBRIDEMENT Left 2/16/2021    Procedure: FOOT DEBRIDE, [address] OUT w/graft application;  Surgeon: Edin Buchanan DPM;  [facility];  Service: Podiatry       Family History   Problem Relation Age of Onset    Diabetes Mother     Heart disease Mother     Other Mother         High blood pressure    Heart disease Father     Diabetes Sister     Other Sister         High blood pressure    Kidney disease Sister     Heart disease Brother     Other Brother         High blood pressure       Social History     Occupational History  Not on file   Tobacco Use    Smoking status: Former Smoker     Quit date:      Years since quittin 3    Smokeless tobacco: Never Used   Substance and Sexual Activity    Alcohol use: Not Currently     Frequency: 2-3 times a week     Drinks per session: 1 or 2     Binge frequency: Never     Comment: beer / liquor    Drug use: Not Currently     Types: Marijuana     Comment: quit 2019 had medical marijuana    Sexual activity: Not Currently       Current Outpatient Medications on File Prior to Visit   Medication Sig    aspirin 81 mg chewable tablet Chew 1 tablet (81 mg total) daily    Azelastine HCl 0 15 % SOLN Inhale 1 spray 2 (two) times a day (Patient taking differently: Inhale 1 spray 2 (two) times a day as needed )    Bimatoprost (LUMIGAN OP) Apply to eye    bismuth subsalicylate (PEPTO BISMOL) 262 MG chewable tablet Chew 524 mg daily as needed for indigestion      Cholecalciferol (Vitamin D3) 1 25 MG (85948 UT) CAPS TAKE 1 CAPSULE BY MOUTH EVERY 30 DAYS    ezetimibe (ZETIA) 10 mg tablet 1 tablet    fluocinonide (LIDEX) 0 05 % cream Apply topically 2 (two) times a day    furosemide (LASIX) 20 mg tablet TAKE 1 TABLET BY MOUTH AS NEEDED FOR EDEMA    gabapentin (NEURONTIN) 300 mg capsule Take 1 capsule (300 mg total) by mouth daily at bedtime    isosorbide mononitrate (IMDUR) 30 mg 24 hr tablet Take 3 tablets (90 mg total) by mouth daily    Lantus SoloStar 100 units/mL injection pen 15 units qhs (Patient taking differently: 17 units qhs)    loperamide (IMODIUM) 2 mg capsule Take 2 mg by mouth 4 (four) times a day as needed for diarrhea    Mirabegron ER (Myrbetriq) 50 MG TB24 Take by mouth daily     acetaminophen (TYLENOL) 325 mg tablet Take 650 mg by mouth every 6 (six) hours as needed for mild pain    ALPRAZolam (XANAX) 0 25 mg tablet At twice a day as needed for anxiety (Patient not taking: Reported on 2021)    amoxicillin (AMOXIL) 500 mg capsule     calcitriol (ROCALTROL) 0 25 mcg capsule Take by mouth daily     docusate sodium (COLACE) 100 mg capsule Take 1 capsule (100 mg total) by mouth 2 (two) times a day (Patient not taking: Reported on 4/8/2021)    Ferrous Sulfate Dried (Feosol) 200 (65 Fe) MG TABS Take 65 mg by mouth daily (Patient not taking: Reported on 4/8/2021)    fluticasone (FLONASE) 50 mcg/act nasal spray One spray in each nostril twice a day (Patient not taking: Reported on 4/8/2021)    gabapentin (NEURONTIN) 600 MG tablet Take 300 mg by mouth 3 (three) times a day    Insulin Pen Needle 31G X 8 MM MISC Inject 3 times a day    latanoprost (XALATAN) 0 005 % ophthalmic solution Administer 1 drop to both eyes daily at bedtime    metoprolol succinate (TOPROL-XL) 100 mg 24 hr tablet Take 1 tablet (100 mg total) by mouth daily    multivitamin (THERAGRAN) TABS Take 1 tablet by mouth daily   NovoLOG FlexPen 100 units/mL injection pen 10 units with each meal  (Patient not taking: Reported on 4/8/2021)    OXYCODONE HCL ER PO Take by mouth    pantoprazole (PROTONIX) 40 mg tablet Take 1 tablet (40 mg total) by mouth daily in the early morning    sertraline (ZOLOFT) 50 mg tablet Take 1 tablet (50 mg total) by mouth daily    tamsulosin (FLOMAX) 0 4 mg Take 1 capsule (0 4 mg total) by mouth daily at bedtime     No current facility-administered medications on file prior to visit  Allergies   Allergen Reactions    Atorvastatin Hives, Itching and Rash    Simvastatin Rash and Edema     "ALL STATINS"    Statins Itching    Insulin Lispro Swelling and Edema    Medical Tape      rash to electrodes    Other Itching, Rash and Other (See Comments)     rash to electrodes and adhesives       Physical Exam    /83   Pulse 70   Temp 97 9 °F (36 6 °C)   Ht 6' 4" (1 93 m)   Wt 111 kg (245 lb)   BMI 29 82 kg/m²     LUMBAR  General: Well-developed, well-nourished individual in mild acute distress  Mental: Appropriate mood and affect   Grossly oriented with coherent speech and thought processing   Neuro:  Cranial nerves: Cranial nerve function is grossly intact bilaterally   Strength: Bilateral lower extremity strength is normal and symmetric   No atrophy or tone abnormalities noted   Reflexes: Bilateral lower extremity muscle stretch reflexes are physiologic and symmetric   No ankle clonus is noted   Sensation: No loss of sensation is noted   SLR/Foraminal Compression Maneuvers: Straight leg raising in the sitting position is negative  for radicular pain   Gait:  Gait/gross motor: Gait is normal  Station is forward flexed  Toe walking, heel walking  are not tested  Musculoskeletal:  Palpation: Inspection and palpation of the spine and extremities are unremarkable   Spine: Normal pain-free range of motion except for lumbar extension which is significantly limited in reproduces his lower back pain complaints  No gross axial skeletal deformities   Skin: Skin inspection grossly negative for erythema, breakdown, or concerning lesions in affected area   Lymph: No lymphadenopathy is appreciated in the involved extremity   Vessels: No lower extremity edema   Lungs: Breathing is comfortable and regular  No dyspnea noted during examination   Eyes: Visual field grossly intact to confrontation  No redness appreciated  ENT: No craniofacial deformities or asymmetry  No neck masses appreciated  Imaging  Study Result    MRI LUMBAR SPINE WITHOUT CONTRAST     INDICATION: possible infected L spine      COMPARISON:  CT 6/29/2020, x-ray 12/26/2019, MR 1/31/2019     TECHNIQUE:  Sagittal T1, sagittal T2, sagittal inversion recovery, axial T1 and axial T2, coronal T2     IMAGE QUALITY:  Diagnostic     FINDINGS:     VERTEBRAL BODIES:  There are 5 nonrib bearing lumbar type vertebral bodies  Dextroscoliosis L2-3 apex, straightening of normal lumbar lordosis, relative retrolisthesis of L4 on L5 with pseudarthrosis between the posterior bodies stable  Leftward   translation of L1, mild rightward translation of L3  New marrow edema is present at the opposing endplates at the A8-2 level  The findings are worrisome for early discitis osteomyelitis  Although there is no phlegmon and there is no epidural   inflammation  Chronic reactive endplate changes to the left at the L2-L3 level      SACRUM:  Normal signal within the sacrum  No evidence of insufficiency or stress fracture      DISTAL CORD AND CONUS:  Normal size and signal within the distal cord and conus  Conus terminates at the T12 level      PARASPINAL SOFT TISSUES:  Paraspinal soft tissues are unremarkable  Multiple stable bilateral renal cysts      LOWER THORACIC DISC SPACES:  Normal disc height and signal   No disc herniation, canal stenosis or foraminal narrowing  Bulging disc T12-L1, right facet arthrosis T11-T12, no critical stenosis      LUMBAR DISC SPACES:     L1-L2:  Progressive loss of disc height, new endplate irregularity, marrow edema within the opposing vertebral bodies most notable to the left  There is also fluid in the anterior aspect of the disc  Findings consistent with early   discitis/osteomyelitis  No paraspinal phlegmon  Bilateral laminectomy      L2-L3:  Asymmetric loss of disc height to the left, marginal osteophytes, advanced bilateral facet arthrosis, circumferential bulge and mild narrowing of the lateral recesses      L3-L4:  Nitrogenous degeneration of the nucleus, marginal osteophytes, advanced facet arthrosis  Disc extends into the foramen bilaterally without root compression  Circumferential bulging the discs with stable central protrusion, producing mild canal   stenosis      L4-L5:  L4 is retrolisthesis on to the collapsed the posterior-superior L5 vertebral body with alteration of normal horizontal plane of the disc  Advanced bilateral facet arthrosis with severe right greater than left foraminal stenosis  Correlate for   L4 radiculitis    Disc bulges circumferentially with prolapse caudally, narrowing the lateral recesses  Correlate for L5 radiculitis      L5-S1:  Moderate bilateral facet arthrosis      IMPRESSION:     New changes at the L1-L2 level consistent with early discitis osteomyelitis  No paraspinal or epidural phlegmon      Pre-existing severe lumbar spondylosis and osteoarthritis      Stable Relative retrolisthesis of L4 on the collapsed L5 vertebral body resulting in significant foraminal and lateral recess stenosis    Correlate for bilateral L4 and bilateral L5 radiculitis      The study was marked in EPIC for significant notification         Workstation performed: [id number]

## 2021-04-08 NOTE — PATIENT INSTRUCTIONS
Arthritis   WHAT YOU NEED TO KNOW:   Arthritis is pain or disease in one or more joints  There are many types of arthritis  Types such as rheumatoid arthritis cause inflammation in the joints  Other types wear away the cartilage between joints, such as osteoarthritis  This makes the bones of the joint rub together when you move the joint  An infection from bacteria, a virus, or a fungus can also cause arthritis  Your symptoms may be constant, or symptoms may come and go  Arthritis often gets worse over time and can cause permanent joint damage  DISCHARGE INSTRUCTIONS:   Call your doctor or rheumatologist if:   · You have a fever and severe joint pain or swelling  · You cannot move the affected joint  · You have severe joint pain you cannot tolerate  · You have a new or worsening rash  · Your pain or swelling does not get better with treatment  · You have questions or concerns about your condition or care  Medicines:   · Acetaminophen  decreases pain and fever  It is available without a doctor's order  Ask how much to take and how often to take it  Follow directions  Read the labels of all other medicines you are using to see if they also contain acetaminophen, or ask your doctor or pharmacist  Acetaminophen can cause liver damage if not taken correctly  Do not use more than 4 grams (4,000 milligrams) total of acetaminophen in one day  · NSAIDs , such as ibuprofen, help decrease swelling, pain, and fever  This medicine is available with or without a doctor's order  NSAIDs can cause stomach bleeding or kidney problems in certain people  If you take blood thinner medicine, always ask your healthcare provider if NSAIDs are safe for you  Always read the medicine label and follow directions  · Steroids  reduce swelling and pain  · Prescription pain medicine  may be given  Ask your healthcare provider how to take this medicine safely  Some prescription pain medicines contain acetaminophen   Do not take other medicines that contain acetaminophen without talking to your healthcare provider  Too much acetaminophen may cause liver damage  Prescription pain medicine may cause constipation  Ask your healthcare provider how to prevent or treat constipation  · Take your medicine as directed  Contact your healthcare provider if you think your medicine is not helping or if you have side effects  Tell him of her if you are allergic to any medicine  Keep a list of the medicines, vitamins, and herbs you take  Include the amounts, and when and why you take them  Bring the list or the pill bottles to follow-up visits  Carry your medicine list with you in case of an emergency  Manage your symptoms:   · Rest your painful joint so it can heal   Your healthcare provider may recommend crutches or a walker if the affected joint is in a leg  · Apply ice or heat to the joint  Both can help decrease swelling and pain  Ice may also help prevent tissue damage  Use an ice pack, or put crushed ice in a plastic bag  Cover it with a towel and place it on your joint for 15 to 20 minutes every hour or as directed  You can apply heat for 20 minutes every 2 hours  Heat treatment includes hot packs or heat lamps  · Elevate your joint  Elevation helps reduce swelling and pain  Raise your joint above the level of your heart as often as you can  Prop your painful joint on pillows to keep it above your heart comfortably  Manage arthritis:   · Talk to your healthcare providers about your arthritis medicines  Some medicines may only be needed when you have arthritis pain  You may need to take other medicines every day to prevent arthritis from getting worse  Your healthcare providers will help you understand all your medicines and when to take them  It is important to take the medicines as directed, even if you start to feel better  You can continue to have joint damage and inflammation even if you do not feel it      · Eat a variety of healthy foods  Healthy foods include fruits, vegetables, whole-grain breads, low-fat dairy products, beans, lean meats, and fish  Ask if you need to be on a special diet  A diet rich in calcium and vitamin D may decrease your risk of osteoporosis  Foods high in calcium include milk, cheese, broccoli, and tofu  Vitamin D may be found in meat, fish, fortified milk, cereal and bread  Ask if you need calcium or vitamin D supplements  · Go to physical or occupational therapy as directed  A physical therapist can teach you exercises to improve flexibility and range of motion  You may also be shown non-weight-bearing exercises that are safe for your joints, such as swimming  Exercise can help keep your joints flexible and reduce pain  An occupational therapist can help you learn to do your daily activities when your joints are stiff or sore  · Maintain a healthy weight  Extra weight puts increased pressure on your joints  Ask your healthcare provider what you should weigh  If you need to lose weight, he or she can help you create a weight loss program  Weight loss can help reduce pain and increase your ability to do your activities  · Wear flat or low-heeled shoes  This will help decrease pain and reduce pressure on your ankle, knee, and hip joints  · Do not smoke  Nicotine and other chemicals in cigarettes and cigars can damage your bones and joints  Ask your healthcare provider for information if you currently smoke and need help to quit  E-cigarettes or smokeless tobacco still contain nicotine  Talk to your healthcare provider before you use these products  Support devices:   · Orthotic shoes or insoles  help support your feet when you walk  · Crutches, a cane, or a walker  may help decrease your risk for falling  They also decrease stress on affected joints  · Devices to prevent falls  include raised toilet seats and bathtub bars to help you get up from sitting   Handrails can be placed in areas where you need balance and support  · Devices to help with support and rest  include splints to wear on your hands and a firm pillow while you sleep  Use a pillow that is firm enough to support your neck and head  Follow up with your healthcare provider or rheumatologist as directed:  Write down your questions so you remember to ask them during your visits  © Copyright [address] Information is for End User's use only and may not be sold, redistributed or otherwise used for commercial purposes  All illustrations and images included in CareNotes® are the copyrighted property of [employer]  or [address] [place], [place] [address]elvira   The above information is an  only  It is not intended as medical advice for individual conditions or treatments  Talk to your doctor, nurse or pharmacist before following any medical regimen to see if it is safe and effective for you

## 2021-04-09 ENCOUNTER — PROCEDURE VISIT (OUTPATIENT)
Dept: UROLOGY | Facility: MEDICAL CENTER | Age: 76
End: 2021-04-09
Payer: MEDICARE

## 2021-04-09 ENCOUNTER — TELEPHONE (OUTPATIENT)
Dept: UROLOGY | Facility: MEDICAL CENTER | Age: 76
End: 2021-04-09

## 2021-04-09 VITALS
HEIGHT: 76 IN | SYSTOLIC BLOOD PRESSURE: 118 MMHG | WEIGHT: 245 LBS | DIASTOLIC BLOOD PRESSURE: 72 MMHG | BODY MASS INDEX: 29.83 KG/M2

## 2021-04-09 DIAGNOSIS — C67.9 MALIGNANT NEOPLASM OF URINARY BLADDER, UNSPECIFIED SITE (HCC): Primary | ICD-10-CM

## 2021-04-09 DIAGNOSIS — R31.0 GROSS HEMATURIA: ICD-10-CM

## 2021-04-09 LAB — POST-VOID RESIDUAL VOLUME, ML POC: 564 ML

## 2021-04-09 PROCEDURE — 51798 US URINE CAPACITY MEASURE: CPT

## 2021-04-09 PROCEDURE — 51700 IRRIGATION OF BLADDER: CPT

## 2021-04-09 PROCEDURE — 99214 OFFICE O/P EST MOD 30 MIN: CPT

## 2021-04-09 RX ORDER — OMEPRAZOLE 20 MG/1
20 CAPSULE, DELAYED RELEASE ORAL
COMMUNITY

## 2021-04-09 NOTE — TELEPHONE ENCOUNTER
Spoke to pt experiencing gross hematuria and clots  He states he is having trouble passing urine  Pt has hx of bladder cancer  He had cysto/evac of clots & fulgeration on 2/10/21 with Dr Mikaela Ho  Pt is coming in this afternoon for evaluation by Dr Olivia Caro

## 2021-04-09 NOTE — PROGRESS NOTES
HISTORY:     onset gross hematuria two days ago  Some clots and difficulty voiding   mL today  Underwent cysto cautery of bleeding two months ago in the OR  ASSESSMENT / PLAN:     22 catheter passed, irrigated to light pink  Only few clots  He knows if it gets blocked off, he has to go to the ER, explicit instructions given     Follow-up three days to see if the bleeding is any better  The following portions of the patient's history were reviewed and updated as appropriate: allergies, current medications, past family history, past medical history, past social history, past surgical history and problem list     Review of Systems   All other systems reviewed and are negative  Objective:     Physical Exam  Constitutional:       General: He is not in acute distress  Appearance: He is well-developed  He is not diaphoretic  HENT:      Head: Normocephalic and atraumatic  Eyes:      General: No scleral icterus  Pulmonary:      Effort: Pulmonary effort is normal    Skin:     Coloration: Skin is not pale  Neurological:      Mental Status: He is alert and oriented to person, place, and time  Psychiatric:         Behavior: Behavior normal          Thought Content:  Thought content normal          Judgment: Judgment normal            No results found for: PSA]  BUN   Date Value Ref Range Status   02/17/2021 33 (H) 5 - 25 mg/dL Final   09/03/2015 19 5 - 25 mg/dL Final     Creatinine   Date Value Ref Range Status   02/17/2021 2 08 (H) 0 60 - 1 30 mg/dL Final     Comment:     Standardized to IDMS reference method   09/03/2015 1 37 (H) 0 60 - 1 30 mg/dL Final     Comment:     Standardized to IDMS reference method     No components found for: CBC      Patient Active Problem List   Diagnosis    CAD (coronary artery disease)    Carcinoma in situ of bladder    Hypertension with renal disease    Hyperlipidemia    Presence of stent in coronary artery    Type 2 diabetes mellitus, with long-term current use of insulin (HCC)    CKD (chronic kidney disease) stage 3, GFR 30-59 ml/min (HCC)    Primary osteoarthritis of left knee    Malignant tumor of urinary bladder (HCC)    Cystitis due to intravesical BCG administration    Degeneration of lumbar intervertebral disc    Back pain    Arteriosclerosis of artery of extremity (HCC)    Stable angina pectoris (HCC)    Herpes zoster without complication    Localized edema    Superficial phlebitis    Preop examination    Acute cystitis without hematuria    Secondary renal hyperparathyroidism (HCC)    Bladder cancer (HCC)    Abnormal MRI, lumbar spine    Diabetic polyneuropathy associated with type 2 diabetes mellitus ([facility] )    Dysuria    Diabetic ulcer of left foot associated with type 2 diabetes mellitus, 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)    CKD (chronic kidney disease) stage 4, GFR 15-29 ml/min (HCC)    Chronic anemia    Anemia of chronic disease    Preoperative examination    PAD (peripheral artery disease) (HCC)    Left carotid bruit    Gross hematuria    Chronic bronchitis (HCC)    Moderate major depression, single episode (HCC)    Thrombocytopenia (HCC)    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        There are no diagnoses linked to this encounter  Patient ID: Alena De Leon is a 64 y o  male        Current Outpatient Medications:     acetaminophen (TYLENOL) 325 mg tablet, Take 650 mg by mouth every 6 (six) hours as needed for mild pain, Disp: , Rfl:     amoxicillin (AMOXIL) 500 mg capsule, , Disp: , Rfl:     aspirin 81 mg chewable tablet, Chew 1 tablet (81 mg total) daily, Disp: 30 tablet, Rfl: 0    Azelastine HCl 0 15 % SOLN, Inhale 1 spray 2 (two) times a day (Patient taking differently: Inhale 1 spray 2 (two) times a day as needed ), Disp: 30 mL, Rfl: 3    Bimatoprost (LUMIGAN OP), Apply to eye, Disp: , Rfl:     bismuth subsalicylate (PEPTO BISMOL) 262 MG chewable tablet, Chew 524 mg daily as needed for indigestion  , Disp: , Rfl:     calcitriol (ROCALTROL) 0 25 mcg capsule, Take by mouth daily , Disp: , Rfl:     Cholecalciferol (Vitamin D3) 1 25 MG (10568 UT) CAPS, TAKE 1 CAPSULE BY MOUTH EVERY 30 DAYS, Disp: 12 capsule, Rfl: 0    docusate sodium (COLACE) 100 mg capsule, Take 1 capsule (100 mg total) by mouth 2 (two) times a day, Disp: 10 capsule, Rfl: 0    ezetimibe (ZETIA) 10 mg tablet, 1 tablet, Disp: , Rfl:     Ferrous Sulfate Dried (Feosol) 200 (65 Fe) MG TABS, Take 65 mg by mouth daily, Disp: 30 each, Rfl: 1    fluocinonide (LIDEX) 0 05 % cream, Apply topically 2 (two) times a day, Disp: , Rfl:     fluticasone (FLONASE) 50 mcg/act nasal spray, One spray in each nostril twice a day, Disp: 1 Bottle, Rfl: 3    furosemide (LASIX) 20 mg tablet, TAKE 1 TABLET BY MOUTH AS NEEDED FOR EDEMA, Disp: 90 tablet, Rfl: 1    gabapentin (NEURONTIN) 300 mg capsule, Take 1 capsule (300 mg total) by mouth daily at bedtime, Disp: 90 capsule, Rfl: 3    gabapentin (NEURONTIN) 600 MG tablet, Take 300 mg by mouth 3 (three) times a day, Disp: , Rfl:     Insulin Pen Needle 31G X 8 MM MISC, Inject 3 times a day, Disp: 100 each, Rfl: 2    isosorbide mononitrate (IMDUR) 30 mg 24 hr tablet, Take 3 tablets (90 mg total) by mouth daily, Disp: 90 tablet, Rfl: 0    Lantus SoloStar 100 units/mL injection pen, 15 units qhs (Patient taking differently: 17 units qhs), Disp: 10 pen, Rfl: 1    latanoprost (XALATAN) 0 005 % ophthalmic solution, Administer 1 drop to both eyes daily at bedtime, Disp: , Rfl:     loperamide (IMODIUM) 2 mg capsule, Take 2 mg by mouth 4 (four) times a day as needed for diarrhea, Disp: , Rfl:     metoprolol succinate (TOPROL-XL) 100 mg 24 hr tablet, Take 1 tablet (100 mg total) by mouth daily, Disp: 30 tablet, Rfl: 0    Mirabegron ER (Myrbetriq) 50 MG TB24, Take by mouth daily , Disp: , Rfl:     multivitamin (THERAGRAN) TABS, Take 1 tablet by mouth daily  , Disp: , Rfl:     omeprazole (PriLOSEC) 20 mg delayed release capsule, Take 20 mg by mouth daily, Disp: , Rfl:     OXYCODONE HCL ER PO, Take by mouth, Disp: , Rfl:     tamsulosin (FLOMAX) 0 4 mg, Take 1 capsule (0 4 mg total) by mouth daily at bedtime, Disp: 30 capsule, Rfl: 0    ALPRAZolam (XANAX) 0 25 mg tablet, At twice a day as needed for anxiety (Patient not taking: Reported on 4/8/2021), Disp: 30 tablet, Rfl: 0    NovoLOG FlexPen 100 units/mL injection pen, 10 units with each meal  (Patient not taking: Reported on 4/8/2021), Disp: 5 pen, Rfl: 1    pantoprazole (PROTONIX) 40 mg tablet, Take 1 tablet (40 mg total) by mouth daily in the early morning, Disp: 30 tablet, Rfl: 0    sertraline (ZOLOFT) 50 mg tablet, Take 1 tablet (50 mg total) by mouth daily, Disp: 30 tablet, Rfl: 3    Past Medical History:   Diagnosis Date    Anemia     Last assessed: 9/28/17    Anxiety     Arteriosclerotic cardiovascular disease     Last assessed: 9/28/17    Arthritis     [facility]     bladder- had BCG treatments    Chronic kidney disease     CKD (chronic kidney disease) stage 4, GFR 15-29 ml/min ([facility])     Colon polyp     Coronary artery disease     7 stents    Depression     Diabetes mellitus [facility] )     IDDM    GERD (gastroesophageal reflux disease)     Glaucoma     History of fusion of cervical spine     Hyperlipidemia     Hypertension     Insomnia     Last assessed: 11/14/12    Loss of hearing     has hearing aids but usually does not wear them    Other seasonal allergic rhinitis     Last assessed: 2/10/16    PAD (peripheral artery disease) ([facility])     Shortness of breath     on exertion    Spinal stenosis of lumbar region     Transient cerebral ischemia     Uses walker     w/c for longer distances       Past Surgical History:   Procedure Laterality Date    CARDIAC SURGERY      Cath stent placement  Last assessed: 3/9/17   Interventional Catheterization    CHOLECYSTECTOMY      COLONOSCOPY      CYSTOSCOPY Diagnostic w/biopsy  Irene Bustos  Last assessed: 12/1/14    CYSTOURETHROSCOPY      w/cautery  Irene Bustos    GASTRIC BYPASS      For morbid obesity w/Shaji-en-Y   Resolved: 11/17/09    INCISION AND DRAINAGE OF WOUND Right 2/26/2017    Procedure: INCISION AND DRAINAGE (I&D) EXTREMITY WITH APPLICATION OF GRAFT JACKET;  Surgeon: Alejandra Vail DPM;  [facility];  Service:     INCISION AND DRAINAGE OF WOUND Right 4/25/2017    Procedure: INCISION AND DRAINAGE (I&D) EXTREMITY, APPLICATION OF GRAFT;  Surgeon: Alejandra Vail DPM;  [facility];  Service:     IR BIOPSY OTHER  7/2/2020    IR LOWER EXTREMITY ANGIOGRAM  2/8/2021    IR LOWER EXTREMITY ANGIOGRAM  2/11/2021    IR TUNNELED CENTRAL LINE PLACEMENT  12/24/2020    JOINT REPLACEMENT      christofer knees replaced    MN AMPUTATION METATARSAL+TOE,SINGLE Left 12/21/2020    Procedure: RAY RESECTION FOOT;  Surgeon: Rajendra Mckeon DPM;  [facility];  Service: Podiatry    MN AMPUTATION METATARSAL+TOE,SINGLE Left 12/31/2020    Procedure: 5TH MET RESECTION;  Surgeon: Rajendra Mckeon DPM;  [facility];  Service: Podiatry    MN CYSTOURETHROSCOPY W/IRRIG & EVAC CLOTS N/A 2/10/2021    Procedure: CYSTOSCOPY EVACUATION OF CLOTS, fulguration;  Surgeon: Aydin Obando MD;  [facility];  Service: Urology    MN CYSTOURETHROSCOPY,BIOPSY N/A 8/16/2016    Procedure: CYSTOSCOPY WITH BIOPSIES;  Surgeon: Jake Hernandez MD;  [facility];  Service: Urology    MN CYSTOURETHROSCOPY,FULGUR <0 5 CM LESN N/A 11/19/2020    Procedure: CYSTO W/BIOPSIES, transurethral prostate bx;  Surgeon: José Miguel Hawkins MD;  [facility];  Service: Urology    MN Karina Mercury 3RD+ ORD [address] PEL/TR [facility] Left 2/8/2021    Procedure: LEG angiogram, CO2 w/limited contrast with balloon angioplasty postertior tibial artery;  Surgeon: Valentino Slates, MD;  [facility];  Service: Vascular    ROTATOR CUFF REPAIR      SMALL INTESTINE SURGERY      Surgery Shaji-en-Y    SPINAL FUSION      lumbar and cervical fusions    VAC DRESSING APPLICATION Right 6/99/2450    Procedure: APPLICATION VAC DRESSING;  Surgeon: Dayanna Forrest DPM;  [facility];  Service:    [address] [place] [address] Left 2/16/2021    Procedure: FOOT DEBRIDE, [address] OUT w/graft application;  Surgeon: Dayanna Forrest DPM;  [facility];  Service: Podiatry       Social History

## 2021-04-09 NOTE — PATIENT INSTRUCTIONS
If bleeding becomes severe and the catheter does not   Drain, go right to [facility]

## 2021-04-09 NOTE — PROGRESS NOTES
Universal Protocol:  Procedure performed by: (Dr Vasile Flanagan )  Consent: Verbal consent obtained  Risks and benefits: risks, benefits and alternatives were discussed  Consent given by: patient  Patient understanding: patient states understanding of the procedure being performed  Patient identity confirmed: verbally with patient      Bladder catheterization    Date/Time: 4/9/2021 3:39 PM  Performed by: Marizol Melendez RN  Authorized by: Mayda Ndiaye MD     Patient location:  Bedside  Consent:     Consent given by:  Patient  Universal protocol:     Procedure explained and questions answered to patient or proxy's satisfaction: yes      Patient identity confirmed:  Verbally with patient  Pre-procedure details:     Preparation: Patient was prepped and draped in usual sterile fashion    Anesthesia (see MAR for exact dosages): Anesthesia method:  Topical application  Procedure details:     Bladder irrigation: yes (Performed by Dr Vasile Flanagan )      Catheter insertion:  Indwelling    Approach: natural orifice      Catheter type: Wagner and latex    Catheter size:  22 Fr    Number of attempts:  1    Successful placement: yes      Urine characteristics:  Bloody  Post-procedure details:     Patient tolerance of procedure: Tolerated well, no immediate complications  Comments:      #22fr hematuria wagner catheter was inserted and balloon filled with 10cc of sterile water  Bladder was drained for dark red blood with clots  Dr Vasile Flanagan was present in the room and irrigated wagner catheter  Please see Dr Flower Caro note for further documentation and plan of care for this patient

## 2021-04-12 ENCOUNTER — PROCEDURE VISIT (OUTPATIENT)
Dept: UROLOGY | Facility: MEDICAL CENTER | Age: 76
End: 2021-04-12
Payer: MEDICARE

## 2021-04-12 VITALS
BODY MASS INDEX: 29.83 KG/M2 | SYSTOLIC BLOOD PRESSURE: 124 MMHG | DIASTOLIC BLOOD PRESSURE: 64 MMHG | HEIGHT: 76 IN | WEIGHT: 245 LBS

## 2021-04-12 DIAGNOSIS — R31.0 GROSS HEMATURIA: ICD-10-CM

## 2021-04-12 DIAGNOSIS — C67.9 MALIGNANT NEOPLASM OF URINARY BLADDER, UNSPECIFIED SITE (HCC): Primary | ICD-10-CM

## 2021-04-12 PROCEDURE — 99211 OFF/OP EST MAY X REQ PHY/QHP: CPT

## 2021-04-12 NOTE — PROGRESS NOTES
4/12/2021    Francisco Harden  1945  [id number]    Diagnosis  Chief Complaint     Urinary Retention; Gross Hematuria          Patient presents for wagner catheter removal s/p hematuria and clot retention managed by Dr Usha Carlson  Return to the office in 4-6 hours if unable to urinate  Return to the office for next scheduled follow up  Procedure Wagner removal/voiding trial    Urine in drainage bag was clear yellow  No hematuria or clots present at time of removal  Wagner catheter removed after deflation of an intact balloon  Patient tolerated well  Encouraged patient to hydrate well and return this afternoon for post void residual   he knows he may return early if uncomfortable and unable to urinate  Patient agrees to this plan  Call placed to patient and spoke with him  Pt stated he has been doing great since leaving the office this morning and having wagner catheter removed  Pt has been urinating without difficulty  Encouraged patient to stay well hydrated as much as he can tolerated  Pt understands and will contact the office with any questions or concerns he should have             Vitals:    04/12/21 0831   BP: 124/64   BP Location: Left arm   Patient Position: Sitting   Cuff Size: Adult   Weight: 111 kg (245 lb)   Height: 6' 4" (1 93 m)           Kulwant Shipman RN

## 2021-04-14 ENCOUNTER — HOSPITAL ENCOUNTER (OUTPATIENT)
Dept: RADIOLOGY | Facility: MEDICAL CENTER | Age: 76
Discharge: HOME | End: 2021-04-14
Attending: PHYSICAL MEDICINE & REHABILITATION
Payer: MEDICARE

## 2021-04-14 VITALS
OXYGEN SATURATION: 98 % | HEART RATE: 65 BPM | SYSTOLIC BLOOD PRESSURE: 93 MMHG | DIASTOLIC BLOOD PRESSURE: 58 MMHG | TEMPERATURE: 98.6 F | RESPIRATION RATE: 20 BRPM

## 2021-04-14 DIAGNOSIS — M54.50 CHRONIC BILATERAL LOW BACK PAIN WITHOUT SCIATICA: ICD-10-CM

## 2021-04-14 DIAGNOSIS — G89.29 CHRONIC BILATERAL LOW BACK PAIN WITHOUT SCIATICA: ICD-10-CM

## 2021-04-14 DIAGNOSIS — M47.816 LUMBAR SPONDYLOSIS: ICD-10-CM

## 2021-04-14 PROCEDURE — 64493 INJ PARAVERT F JNT L/S 1 LEV: CPT | Performed by: PHYSICAL MEDICINE & REHABILITATION

## 2021-04-14 PROCEDURE — 64494 INJ PARAVERT F JNT L/S 2 LEV: CPT | Performed by: PHYSICAL MEDICINE & REHABILITATION

## 2021-04-14 RX ADMIN — Medication 3 ML: at 14:44

## 2021-04-14 NOTE — H&P
History of Present Illness:  The patient is a 64 y o  male who presents with complaints of bilateral lower back pain    Patient Active Problem List   Diagnosis    CAD (coronary artery disease)    Carcinoma in situ of bladder    Hypertension with renal disease    Hyperlipidemia    Presence of stent in coronary artery    Type 2 diabetes mellitus, with long-term current use of insulin (HCC)    CKD (chronic kidney disease) stage 3, GFR 30-59 ml/min (HCC)    Primary osteoarthritis of left knee    Malignant tumor of urinary bladder (HCC)    Cystitis due to intravesical BCG administration    Degeneration of lumbar intervertebral disc    Back pain    Arteriosclerosis of artery of extremity (HCC)    Stable angina pectoris (HCC)    Herpes zoster without complication    Localized edema    Superficial phlebitis    Preop examination    Acute cystitis without hematuria    Secondary renal hyperparathyroidism (HCC)    Bladder cancer (HCC)    Abnormal MRI, lumbar spine    Diabetic polyneuropathy associated with type 2 diabetes mellitus ([address] )    Dysuria    Diabetic ulcer of left foot associated with type 2 diabetes mellitus, 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)    CKD (chronic kidney disease) stage 4, GFR 15-29 ml/min ([facility])    Chronic anemia    Anemia of chronic disease    Preoperative examination    PAD (peripheral artery disease) ([facility])    Left carotid bruit    Gross hematuria    Chronic bronchitis (HCC)    Moderate major depression, single episode (HCC)    Thrombocytopenia ([address] )    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       Past Medical History:   Diagnosis Date    Anemia     Last assessed: 9/28/17    Anxiety     Arteriosclerotic cardiovascular disease     Last assessed: 9/28/17    Arthritis     Bladder cancer ([address] )     bladder- had BCG treatments    Chronic kidney disease     CKD (chronic kidney disease) stage 4, GFR 15-29 ml/min (HCC)     Colon polyp     Coronary artery disease 7 stents    Depression     Diabetes mellitus ([address] )     IDDM    GERD (gastroesophageal reflux disease)     Glaucoma     History of fusion of cervical spine     Hyperlipidemia     Hypertension     Insomnia     Last assessed: 11/14/12    Loss of hearing     has hearing aids but usually does not wear them    Other seasonal allergic rhinitis     Last assessed: 2/10/16    PAD (peripheral artery disease) ([facility])     Shortness of breath     on exertion    Spinal stenosis of lumbar region     Transient cerebral ischemia     Uses walker     w/c for longer distances       Past Surgical History:   Procedure Laterality Date    CARDIAC SURGERY      Cath stent placement  Last assessed: 3/9/17  Interventional Catheterization    CHOLECYSTECTOMY      COLONOSCOPY      CYSTOSCOPY      Diagnostic w/biopsy  Nithin Moses  Last assessed: 12/1/14    CYSTOURETHROSCOPY      w/cautery  Nithin Moses    GASTRIC BYPASS      For morbid obesity w/Shaji-en-Y   Resolved: 11/17/09    INCISION AND DRAINAGE OF WOUND Right 2/26/2017    Procedure: INCISION AND DRAINAGE (I&D) EXTREMITY WITH APPLICATION OF GRAFT JACKET;  Surgeon: Zaina Horan DPM;  [facility];  Service:     INCISION AND DRAINAGE OF WOUND Right 4/25/2017    Procedure: INCISION AND DRAINAGE (I&D) EXTREMITY, APPLICATION OF GRAFT;  Surgeon: Zaina Horan DPM;  [facility];  Service:     IR BIOPSY OTHER  7/2/2020    IR LOWER EXTREMITY ANGIOGRAM  2/8/2021    IR LOWER EXTREMITY ANGIOGRAM  2/11/2021    IR TUNNELED CENTRAL LINE PLACEMENT  12/24/2020    JOINT REPLACEMENT      christofer knees replaced    OK AMPUTATION METATARSAL+TOE,SINGLE Left 12/21/2020    Procedure: RAY RESECTION FOOT;  Surgeon: Amanda Coates DPM;  [facility];  Service: Podiatry    OK AMPUTATION METATARSAL+TOE,SINGLE Left 12/31/2020    Procedure: 5TH MET RESECTION;  Surgeon: Amanda Coates DPM;  [facility];  Service: Podiatry    OK CYSTOURETHROSCOPY W/IRRIG & EVAC CLOTS N/A 2/10/2021    Procedure: CYSTOSCOPY EVACUATION OF CLOTS, fulguration;  Surgeon: Lana Fernandez MD;  [facility];  Service: Urology    NC CYSTOURETHROSCOPY,BIOPSY N/A 8/16/2016    Procedure: Gavino Gibson;  Surgeon: Kimber Darling MD;  [facility];  Service: Urology    NC CYSTOURETHROSCOPY,FULGUR <0 5 CM LESN N/A 11/19/2020    Procedure: CYSTO W/BIOPSIES, transurethral prostate bx;  Surgeon: Jennifer Shetty MD;  [facility];  Service: Urology    NC Raúl Brito 3RD+ ORD SLCTV ABDL PEL/LXTR [facility] Left 2/8/2021    Procedure: LEG angiogram, CO2 w/limited contrast with balloon angioplasty postertior tibial artery;  Surgeon: Narayan Jane MD;  [facility];  Service: Vascular    ROTATOR CUFF REPAIR      SMALL INTESTINE SURGERY      Surgery Shaji-en-Y    SPINAL FUSION      lumbar and cervical fusions    VAC DRESSING APPLICATION Right 9/37/3849    Procedure: APPLICATION VAC DRESSING;  Surgeon: George Claros DPM;  [facility];  Service:     WOUND DEBRIDEMENT Left 2/16/2021    Procedure: FOOT DEBRIDE, [address] OUT w/graft application;  Surgeon: George Claros DPM;  [facility];  Service: Podiatry         Current Outpatient Medications:     acetaminophen (TYLENOL) 325 mg tablet, Take 650 mg by mouth every 6 (six) hours as needed for mild pain, Disp: , Rfl:     ALPRAZolam (XANAX) 0 25 mg tablet, At twice a day as needed for anxiety (Patient not taking: Reported on 4/8/2021), Disp: 30 tablet, Rfl: 0    amoxicillin (AMOXIL) 500 mg capsule, , Disp: , Rfl:     aspirin 81 mg chewable tablet, Chew 1 tablet (81 mg total) daily, Disp: 30 tablet, Rfl: 0    Azelastine HCl 0 15 % SOLN, Inhale 1 spray 2 (two) times a day (Patient taking differently: Inhale 1 spray 2 (two) times a day as needed ), Disp: 30 mL, Rfl: 3    Bimatoprost (LUMIGAN OP), Apply to eye, Disp: , Rfl:     bismuth subsalicylate (PEPTO BISMOL) 262 MG chewable tablet, Chew 524 mg daily as needed for indigestion  , Disp: , Rfl:     calcitriol (ROCALTROL) 0 25 mcg capsule, Take by mouth daily , Disp: , Rfl:     Cholecalciferol (Vitamin D3) 1 25 MG (03343 UT) CAPS, TAKE 1 CAPSULE BY MOUTH EVERY 30 DAYS, Disp: 12 capsule, Rfl: 0    docusate sodium (COLACE) 100 mg capsule, Take 1 capsule (100 mg total) by mouth 2 (two) times a day, Disp: 10 capsule, Rfl: 0    ezetimibe (ZETIA) 10 mg tablet, 1 tablet, Disp: , Rfl:     Ferrous Sulfate Dried (Feosol) 200 (65 Fe) MG TABS, Take 65 mg by mouth daily, Disp: 30 each, Rfl: 1    fluocinonide (LIDEX) 0 05 % cream, Apply topically 2 (two) times a day, Disp: , Rfl:     fluticasone (FLONASE) 50 mcg/act nasal spray, One spray in each nostril twice a day, Disp: 1 Bottle, Rfl: 3    furosemide (LASIX) 20 mg tablet, TAKE 1 TABLET BY MOUTH AS NEEDED FOR EDEMA, Disp: 90 tablet, Rfl: 1    gabapentin (NEURONTIN) 300 mg capsule, Take 1 capsule (300 mg total) by mouth daily at bedtime, Disp: 90 capsule, Rfl: 3    gabapentin (NEURONTIN) 600 MG tablet, Take 300 mg by mouth 3 (three) times a day, Disp: , Rfl:     Insulin Pen Needle 31G X 8 MM MISC, Inject 3 times a day, Disp: 100 each, Rfl: 2    isosorbide mononitrate (IMDUR) 30 mg 24 hr tablet, Take 3 tablets (90 mg total) by mouth daily, Disp: 90 tablet, Rfl: 0    Lantus SoloStar 100 units/mL injection pen, 15 units qhs (Patient taking differently: 17 units qhs), Disp: 10 pen, Rfl: 1    latanoprost (XALATAN) 0 005 % ophthalmic solution, Administer 1 drop to both eyes daily at bedtime, Disp: , Rfl:     loperamide (IMODIUM) 2 mg capsule, Take 2 mg by mouth 4 (four) times a day as needed for diarrhea, Disp: , Rfl:     metoprolol succinate (TOPROL-XL) 100 mg 24 hr tablet, Take 1 tablet (100 mg total) by mouth daily, Disp: 30 tablet, Rfl: 0    Mirabegron ER (Myrbetriq) 50 MG TB24, Take by mouth daily , Disp: , Rfl:     multivitamin (THERAGRAN) TABS, Take 1 tablet by mouth daily  , Disp: , Rfl:    NovoLOG FlexPen 100 units/mL injection pen, 10 units with each meal  (Patient not taking: Reported on 4/8/2021), Disp: 5 pen, Rfl: 1    omeprazole (PriLOSEC) 20 mg delayed release capsule, Take 20 mg by mouth daily, Disp: , Rfl:     OXYCODONE HCL ER PO, Take by mouth, Disp: , Rfl:     pantoprazole (PROTONIX) 40 mg tablet, Take 1 tablet (40 mg total) by mouth daily in the early morning, Disp: 30 tablet, Rfl: 0    sertraline (ZOLOFT) 50 mg tablet, Take 1 tablet (50 mg total) by mouth daily, Disp: 30 tablet, Rfl: 3    tamsulosin (FLOMAX) 0 4 mg, Take 1 capsule (0 4 mg total) by mouth daily at bedtime, Disp: 30 capsule, Rfl: 0    Current Facility-Administered Medications:     lidocaine (PF) (XYLOCAINE-MPF) 2 % injection 4 mL, 4 mL, Perineural, Once, Paddy Iraj, DO    Allergies   Allergen Reactions    Atorvastatin Hives, Itching and Rash    Simvastatin Rash and Edema     "ALL STATINS"    Statins Itching    Insulin Lispro Swelling and Edema    Medical Tape      rash to electrodes    Other Itching, Rash and Other (See Comments)     rash to electrodes and adhesives       Physical Exam:   Vitals:    04/14/21 1428   BP: 103/69   Pulse: 70   Resp: 20   Temp: 98 6 °F (37 °C)   SpO2: 96%     General: Awake, Alert, Oriented x 3, Mood and affect appropriate  Respiratory: Respirations even and unlabored  Cardiovascular: Peripheral pulses intact; no edema  Musculoskeletal Exam: bilateral lower back pain    ASA Score: 2    Patient/Chart Verification  Patient ID Verified: Verbal  ID Band Applied: No  Consents Confirmed: Procedural  H&P( within 30 days) Verified: To be obtained in the Pre-Procedure area  Interval H&P(within 24 hr) Complete (required for Outpatients and Surgery Admit only): To be obtained in the Pre-Procedure area  Allergies Reviewed: Yes  Anticoag/NSAID held?: No(pt takes 81 mg aspirin, hold not required for this procedure )  Currently on antibiotics?: No    Assessment:   1   Chronic bilateral low back pain without sciatica    2   Lumbar spondylosis        Plan: (B) L3-5 MBB#1

## 2021-04-14 NOTE — DISCHARGE INSTRUCTIONS
ACTIVITY  · Please do activities that will bring on the normal pain that we are rating  For example, if vacuuming or walking increases the pain, do that  This will give the most accurate response to the diary  · You may shower, but no tub baths today, or applied heat  CARE OF THE INJECTION SITE  · This area may be numb for several hours after the injection  · Notify [facility] if you have any of the following:  redness, drainage, swelling, or fever above 100°F     SPECIAL INSTRUCTIONS  · Please return the MBB diary to our office by mail, fax, or drop it off  MEDICATIONS  · Please do not take any break through or short acting pain medications for 8 hours after the block  · Continue to take all routine medications  · Our office may have instructed you to hold some medications  As no general anesthesia was used in today's procedure, you should not experience any side effects related to anesthesia  If you have a problem specifically related to your procedure, please call our office at [phone]  Problems not related to your procedure should be directed to your primary care physician

## 2021-04-16 ENCOUNTER — TELEPHONE (OUTPATIENT)
Dept: RADIOLOGY | Facility: MEDICAL CENTER | Age: 76
End: 2021-04-16

## 2021-04-16 NOTE — TELEPHONE ENCOUNTER
Left message to call back for an office visit to review lumbar MBB response  Please schedule patient with Jorge Montero or Eileen Ngo  Thank you

## 2021-04-16 NOTE — TELEPHONE ENCOUNTER
Pain diary reviewed by Dr Sutherland July; poor response to bilateral L3-5 MBB #1  Schedule f/u with NP: review lumbar MBB response

## 2021-04-20 ENCOUNTER — TELEPHONE (OUTPATIENT)
Dept: PAIN MEDICINE | Facility: CLINIC | Age: 76
End: 2021-04-20

## 2021-04-20 ENCOUNTER — OFFICE VISIT (OUTPATIENT)
Dept: PAIN MEDICINE | Facility: MEDICAL CENTER | Age: 76
End: 2021-04-20
Payer: MEDICARE

## 2021-04-20 VITALS
HEIGHT: 76 IN | WEIGHT: 241 LBS | TEMPERATURE: 97.6 F | HEART RATE: 62 BPM | DIASTOLIC BLOOD PRESSURE: 70 MMHG | SYSTOLIC BLOOD PRESSURE: 114 MMHG | BODY MASS INDEX: 29.35 KG/M2

## 2021-04-20 DIAGNOSIS — M54.16 LUMBAR RADICULOPATHY: ICD-10-CM

## 2021-04-20 DIAGNOSIS — M51.36 DEGENERATION OF LUMBAR INTERVERTEBRAL DISC: Primary | ICD-10-CM

## 2021-04-20 DIAGNOSIS — M25.461 PAIN AND SWELLING OF RIGHT KNEE: ICD-10-CM

## 2021-04-20 DIAGNOSIS — G89.4 CHRONIC PAIN SYNDROME: ICD-10-CM

## 2021-04-20 DIAGNOSIS — M25.561 RIGHT KNEE PAIN, UNSPECIFIED CHRONICITY: Primary | ICD-10-CM

## 2021-04-20 DIAGNOSIS — M47.816 LUMBAR SPONDYLOSIS: ICD-10-CM

## 2021-04-20 DIAGNOSIS — M25.561 PAIN AND SWELLING OF RIGHT KNEE: ICD-10-CM

## 2021-04-20 PROCEDURE — 99214 OFFICE O/P EST MOD 30 MIN: CPT | Performed by: NURSE PRACTITIONER

## 2021-04-20 RX ORDER — DULOXETIN HYDROCHLORIDE 20 MG/1
20 CAPSULE, DELAYED RELEASE ORAL DAILY
Qty: 30 CAPSULE | Refills: 1 | Status: SHIPPED | OUTPATIENT
Start: 2021-04-20 | End: 2021-08-30 | Stop reason: SDUPTHER

## 2021-04-20 NOTE — TELEPHONE ENCOUNTER
Sommer Mendez from X-Ray called again asking someone to follow up with pt X-Ray  Pt states it is his right knee that hurts not his left knee  he was sent home  Sommer Mendez needs a order to x-ray his right knee as soon as possible      Sommer Mendez -[phone]    Pt # [phone]

## 2021-04-20 NOTE — PATIENT INSTRUCTIONS
Duloxetine (By mouth)   Duloxetine (doo-LOX-[id number]-teen)  Treats depression, anxiety, diabetic peripheral neuropathy, fibromyalgia, and chronic muscle or bone pain  This medicine is an SSNRI  Brand Name(s): Cymbalta, Drizalma Sprinkle, Lolyka   There may be other brand names for this medicine  When This Medicine Should Not Be Used: This medicine is not right for everyone  Do not use it if you had an allergic reaction to duloxetine  How to Use This Medicine:   Capsule, Delayed Release Capsule  · Take your medicine as directed  Your dose may need to be changed several times to find what works best for you  · Delayed-release capsule: Swallow the capsule whole  Do not crush, chew, break, or open it  Do not open the Cymbalta® delayed-release capsule and sprinkle the contents on food or in liquids  · If you have trouble swallowing the Clydie Dopp delayed release capsule:   ? You may open the capsule and sprinkle the contents over one tablespoon (15 mL) of applesauce  Swallow the mixture right away and do not save any of the mixture to use later  ? You may open the capsule and pour the contents to an all plastic catheter tip syringe and add 50 mL of water  Do not use other liquids  Gently shake it for 10 seconds, and then use it through a nasogastric tube  Rinse with additional water (about 15 mL) if needed  · This medicine should come with a Medication Guide  Ask your pharmacist for a copy if you do not have one  · Missed dose: Take a dose as soon as you remember  If it is almost time for your next dose, wait until then and take a regular dose  Do not take extra medicine to make up for a missed dose  · Store the medicine in a closed container at room temperature, away from heat, moisture, and direct light  Drugs and Foods to Avoid:   Ask your doctor or pharmacist before using any other medicine, including over-the-counter medicines, vitamins, and herbal products    · Do not take duloxetine if you have used an MAO inhibitor (MAOI) within the past 14 days  Do not start taking an MAO inhibitor within 5 days of stopping duloxetine  · Some medicines can affect how duloxetine works  Tell your doctor if you are using any of the following:  ? Buspirone, cimetidine, ciprofloxacin, enoxacin, fentanyl, lithium, Aby's wort, theophylline, tramadol, tryptophan, or warfarin  ? Amphetamines  ? Blood pressure medicine  ? Diuretic (water pill)  ? Medicine for heart rhythm problems (including flecainide, propafenone, quinidine)  ? Medicine to treat migraine headaches (including triptans)  ? NSAID pain or arthritis medicine (including aspirin, celecoxib, diclofenac, ibuprofen, naproxen)  ? Other medicine to treat depression or mood disorders (including amitriptyline, desipramine, fluoxetine, imipramine, nortriptyline, paroxetine)  ? Phenothiazine medicine (including thioridazine)  · Tell your doctor if you use anything else that makes you sleepy  Some examples are allergy medicine, narcotic pain medicine, and alcohol  · Do not drink alcohol while you are using this medicine  Warnings While Using This Medicine:   · Tell your doctor if you are pregnant or breastfeeding, or if you have kidney disease, liver disease, diabetes, digestion problems, glaucoma, heart disease, high or low blood pressure, or problems with urination  Tell your doctor if you smoke or you have a history of seizures, or drug or alcohol addiction  · This medicine may cause the following problems:   ? Serious liver problems  ? Serotonin syndrome (more likely when used with certain other medicines)  ? Increased risk of bleeding problems  ? Serious skin reactions  ? Low sodium levels in the blood  · This medicine can increase thoughts of suicide  Tell your doctor right away if you start to feel depressed and have thoughts about hurting yourself  · This medicine can cause changes in your blood pressure  This may make you dizzy or drowsy   Do not drive or do anything that could be dangerous until you know how this medicine affects you  Stand up slowly to avoid falls  · Do not stop using this medicine suddenly  Your doctor will need to slowly decrease your dose before you stop it completely  · Your doctor will check your progress and the effects of this medicine at regular visits  Keep all appointments  · Keep all medicine out of the reach of children  Never share your medicine with anyone  Possible Side Effects While Using This Medicine:   Call your doctor right away if you notice any of these side effects:  · Allergic reaction: Itching or hives, swelling in your face or hands, swelling or tingling in your mouth or throat, chest tightness, trouble breathing  · Anxiety, restlessness, fever, fast heartbeat, sweating, muscle spasms, diarrhea, seeing or hearing things that are not there  · Blistering, peeling, red skin rash  · Confusion, weakness, muscle twitching  · Dark urine or pale stools, nausea, vomiting, loss of appetite, stomach pain, yellow skin or eyes  · Decrease in how much or how often you urinate  · Eye pain, vision changes, seeing halos around lights  · Feeling more energetic than usual  · Lightheadedness, dizziness, fainting  · Unusual moods or behaviors, worsening depression, thoughts about hurting yourself, trouble sleeping  · Unusual bleeding or bruising  If you notice these less serious side effects, talk with your doctor:   · Decrease in appetite or weight  · Dry mouth, constipation, mild nausea  · Headache  · Unusual drowsiness, sleepiness, or tiredness  If you notice other side effects that you think are caused by this medicine, tell your doctor  Call your doctor for medical advice about side effects  You may report side effects to FDA at 8-216-FDA-3600  © Copyright [address] [place] [address] Drive Information is for End User's use only and may not be sold, redistributed or otherwise used for commercial purposes  The above information is an  only   It is not intended as medical advice for individual conditions or treatments  Talk to your doctor, nurse or pharmacist before following any medical regimen to see if it is safe and effective for you  Lower Back Exercises   AMBULATORY CARE:   Lower back exercises  help heal and strengthen your back muscles to prevent another injury  Ask your healthcare provider if you need to see a physical therapist for more advanced exercises  Seek care immediately if:   · You have severe pain that prevents you from moving  Contact your healthcare provider if:   · Your pain becomes worse  · You have new pain  · You have questions or concerns about your condition or care  Do lower back exercises safely:   · Do the exercises on a mat or firm surface  (not on a bed) to support your spine and prevent low back pain  · Move slowly and smoothly  Avoid fast or jerky motions  · Breathe normally  Do not hold your breath  · Stop if you feel pain  It is normal to feel some discomfort at first  Regular exercise will help decrease your discomfort over time  Lower back exercises: Your healthcare provider may recommend that you do back exercises 10 to 30 minutes each day  He may also recommend that you do exercises 1 to 3 times each day  Ask your healthcare provider which exercises are best for you and how often to do them  · Ankle pumps:  Lie on your back  Move your foot up (with your toes pointing toward your head)  Then, move your foot down (with your toes pointing away from you)  Repeat this exercise 10 times on each side  · Heel slides:  Lie on your back  Slowly bend one leg and then straighten it  Next, bend the other leg and then straighten it  Repeat 10 times on each side  · Pelvic tilt:  Lie on your back with your knees bent and feet flat on the floor  Place your arms in a relaxed position beside your body  Tighten the muscles of your abdomen and flatten your back against the floor   Hold for 5 seconds  Repeat 5 times  · Back stretch:  Lie on your back with your hands behind your head  Bend your knees and turn the lower half of your body to one side  Hold this position for 10 seconds  Repeat 3 times on each side  · Straight leg raises:  Lie on your back with one leg straight  Bend the other knee  Tighten your abdomen and then slowly lift the straight leg up about 6 to 12 inches off the floor  Hold for 1 to 5 seconds  Lower your leg slowly  Repeat 10 times on each leg  · Knee-to-chest:  Lie on your back with your knees bent and feet flat on the floor  Pull one of your knees toward your chest and hold it there for 5 seconds  Return your leg to the starting position  Lift the other knee toward your chest and hold for 5 seconds  Do this 5 times on each side  · Cat and camel:  Place your hands and knees on the floor  Arch your back upward toward the ceiling and lower your head  Round out your spine as much as you can  Hold for 5 seconds  Lift your head upward and push your chest downward toward the floor  Hold for 5 seconds  Do 3 sets or as directed  · Wall squats:  Stand with your back against a wall  Tighten the muscles of your abdomen  Slowly lower your body until your knees are bent at a 45 degree angle  Hold this position for 5 seconds  Slowly move back up to a standing position  Repeat 10 times  · Curl up:  Lie on your back with your knees bent and feet flat on the floor  Place your hands, palms down, underneath the curve in your lower back  Next, with your elbows on the floor, lift your shoulders and chest 2 to 3 inches  Keep your head in line with your shoulders  Hold this position for 5 seconds  When you can do this exercise without pain for 10 to 15 seconds, you may add a rotation  While your shoulders and chest are lifted off the ground, turn slightly to the left and hold  Repeat on the other side           · Bird dog:  Place your hands and knees on the floor  Keep your wrists directly below your shoulders and your knees directly below your hips  Pull your belly button in toward your spine  Do not flatten or arch your back  Tighten your abdominal muscles  Raise one arm straight out so that it is aligned with your head  Next, raise the leg opposite your arm  Hold this position for 15 seconds  Lower your arm and leg slowly and change sides  Do 5 sets  © Copyright [address] Information is for End User's use only and may not be sold, redistributed or otherwise used for commercial purposes  All illustrations and images included in CareNotes® are the copyrighted property of [employer]  or [employer]   The above information is an  only  It is not intended as medical advice for individual conditions or treatments  Talk to your doctor, nurse or pharmacist before following any medical regimen to see if it is safe and effective for you  Core Strengthening Exercises   WHAT YOU NEED TO KNOW:   Your core includes the muscles of your lower back, hip, pelvis, and abdomen  Core strengthening exercises help heal and strengthen these muscles  This helps prevent another injury, and keeps your pelvis, spine, and hips in the correct position  DISCHARGE INSTRUCTIONS:   Contact your healthcare provider if:   · You have sharp or worsening pain during exercise or at rest     · You have questions or concerns about your shoulder exercises  Safety tips:  Talk to your healthcare provider before you start an exercise program  A physical therapist can teach you how to do core strengthening exercises safely  · Do the exercises on a mat or firm surface  A firm surface will support your spine and prevent low back pain  Do not do these exercises on a bed  · Move slowly and smoothly  Avoid fast or jerky motions  · Stop if you feel pain  Core exercises should not be painful  Stop if you feel pain       · Breathe normally during core exercises  Do not hold your breath  This may cause an increase in blood pressure and prevent muscle strengthening  Your healthcare provider will tell you when to inhale and exhale during the exercise  · Begin all of your exercises with abdominal bracing  Abdominal bracing helps warm up your core muscles  You can also practice abdominal bracing throughout the day  Lie on your back with your knees bent and feet flat on the floor  Place your arms in a relaxed position beside your body  Tighten your abdominal muscles  Pull your belly button in and up toward your spine  Hold for 5 seconds  Relax your muscles  Repeat 10 times  Core strengthening exercises: Your healthcare provider will tell you how often to do these exercises  The provider will also tell you how many repetitions of each exercise you should do  Hold each exercise for 5 seconds or as directed  As you get stronger, increase your hold to 10 to 15 seconds  You can do some of these exercises on a stability ball, or with a weight  Ask your healthcare provider how to use a stability ball or weight for these exercises:  · Bridging:  Lie on your back with your knees bent and feet flat on the floor  Rest your arms at your side  Tighten your buttocks, and then lift your hips 1 inch off the floor  Hold for 5 seconds  When you can do this exercise without pain for 10 seconds, increase the distance you lift your hips  A good goal is to be able to lift your hips so that your shoulders, hips, and knees are in a straight line  · Dead bug:  Lie on your back with your knees bent and feet flat on the floor  Place your arms in a relaxed position beside your body  Begin with abdominal bracing  Next, raise one leg, keeping your knee bent  Hold for 5 seconds  Repeat with the other leg  When you can do this exercise without pain for 10 to 15 seconds, you may raise one straight leg and hold  Repeat with the other leg           · Quadruped:  Place your hands and knees on the floor  Keep your wrists directly below your shoulders and your knees directly below your hips  Pull your belly button in toward your spine  Do not flatten or arch your back  Tighten your abdominal muscles below your belly button  Hold for 5 seconds  When you can do this exercise without pain for 10 to 15 seconds, you may extend one arm and hold  Repeat on the other side  · Side bridge exercises:      ? Standing side bridge:  Stand next to a wall and extend one arm toward the wall  Place your palm flat on the wall with your fingers pointing upward  Begin with abdominal bracing  Next, without moving your feet, slowly bend your arm to 90 degrees  Hold for 5 seconds  Repeat on the other side  When you can do this exercise without pain for 10 to 15 seconds, you may do the bent leg side bridge on the floor  ? Bent leg side bridge:  Lie on one side with your legs, hips, and shoulders in a straight line  Prop yourself up onto your forearm so your elbow is directly below your shoulder  Bend your knees back to 90 degrees  Begin with abdominal bracing  Next, lift your hips and balance yourself on your forearm and knees  Hold for 5 seconds  Repeat on the other side  When you can do this exercise without pain for 10 to 15 seconds, you may do the straight leg side bridge on the floor  ? Straight leg side bridge:  Lie on one side with your legs, hips, and shoulders in a straight line  Prop yourself up onto your forearm so your elbow is directly below your shoulder  Begin with abdominal bracing  Lift your hips off the floor and balance yourself on your forearm and the outside of your flexed foot  Do not let your ankle bend sideways  Hold for 5 seconds  Repeat on the other side  When you can do this exercise without pain for 10 to 15 seconds, ask your healthcare provider for more advanced exercises  · Superman:  Lie on your stomach  Extend your arms forward on the floor   Tighten your abdominal muscles and lift your right hand and left leg off the floor  Hold this position  Slowly return to the starting position  Tighten your abdominal muscles and lift your left hand and right leg off the floor  Hold this position  Slowly return to the starting position  · Clam:  Lie on your side with your knees bent  Put your bottom arm under your head to keep your neck in line  Put your top hand on your hip to keep your pelvis from moving  Put your heels together, and keep them together during this exercise  Slowly raise your top knee toward the ceiling  Then lower your leg so your knees are together  Repeat this exercise 10 times  Then switch sides and do the exercise 10 times with the other leg  · Curl up:  Lie on your back with your knees bent and feet flat on the floor  Place your hands, palms down, underneath your lower back  Next, with your elbows on the floor, lift your shoulders and chest 2 to 3 inches off the floor  Keep your head in line with your shoulders  Hold this position  Slowly return to the starting position  · Straight leg raises:  Lie on your back with one leg straight  Bend the other knee and place your foot flat on the floor  Tighten your abdominal muscles  Keep your leg straight and slowly lift it straight up 6 to 12 inches off the floor  Hold this position  Lower your leg slowly  Do as many repetitions as directed on this side  Repeat with the other leg  · Plank:  Lie on your stomach  Bend your elbows and place your forearms flat on the floor  Lift your chest, stomach, and knees off the floor  Make sure your elbows are below your shoulders  Your body should be in a straight line  Do not let your hips or lower back sink to the ground  Squeeze your abdominal muscles together and hold for 15 seconds  To make this exercise harder, hold for 30 seconds or lift 1 leg at a time  · Bicycles:  Lie on your back   Bend both knees and bring them toward your chest  Your calves should be parallel to the floor  Place the palms of your hands on the back of your head  Straighten your right leg and keep it lifted 2 inches off the floor  Raise your head and shoulders off the floor and twist towards your left  Keep your head and shoulders lifted  Bend your right knee while you straighten your left leg  Keep your left leg 2 inches off the floor  Twist your head and chest towards the left leg  Continue to straighten 1 leg at a time and twist        Follow up with your healthcare provider as directed:  Write down your questions so you remember to ask them during your visits  © Copyright [address] Information is for End User's use only and may not be sold, redistributed or otherwise used for commercial purposes  All illustrations and images included in CareNotes® are the copyrighted property of [employer]  or [place] Liseth Brito   The above information is an  only  It is not intended as medical advice for individual conditions or treatments  Talk to your doctor, nurse or pharmacist before following any medical regimen to see if it is safe and effective for you

## 2021-04-20 NOTE — TELEPHONE ENCOUNTER
RN s/w rep from [facility] and they stated that the pt may come back at anytime between 7 AM and 10PM to have the xray done but must re register  RN s/w pt regarding same pt appreciative and verbalized understanding of same

## 2021-04-20 NOTE — TELEPHONE ENCOUNTER
Call from [address], [place]  Ph# [phone]    Caller says the patient is getting a knee xray today however the order says its for left knee and the patient says its for his right knee  Please follow up
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
## 2021-04-28 ENCOUNTER — OFFICE VISIT (OUTPATIENT)
Dept: INTERNAL MEDICINE CLINIC | Facility: CLINIC | Age: 76
End: 2021-04-28
Payer: MEDICARE

## 2021-04-28 VITALS
WEIGHT: 240 LBS | HEIGHT: 76 IN | BODY MASS INDEX: 29.22 KG/M2 | SYSTOLIC BLOOD PRESSURE: 110 MMHG | HEART RATE: 80 BPM | DIASTOLIC BLOOD PRESSURE: 70 MMHG | TEMPERATURE: 97.5 F | RESPIRATION RATE: 12 BRPM

## 2021-04-28 DIAGNOSIS — F32.1 MODERATE MAJOR DEPRESSION, SINGLE EPISODE (HCC): ICD-10-CM

## 2021-04-28 DIAGNOSIS — Z79.4 TYPE 2 DIABETES MELLITUS WITH STAGE 3 CHRONIC KIDNEY DISEASE, WITH LONG-TERM CURRENT USE OF INSULIN, UNSPECIFIED WHETHER STAGE 3A OR 3B CKD (HCC): Primary | ICD-10-CM

## 2021-04-28 DIAGNOSIS — I12.9 HYPERTENSION WITH RENAL DISEASE: ICD-10-CM

## 2021-04-28 DIAGNOSIS — L97.426 DIABETIC ULCER OF LEFT MIDFOOT ASSOCIATED WITH TYPE 2 DIABETES MELLITUS, WITH BONE INVOLVEMENT WITHOUT EVIDENCE OF NECROSIS (HCC): ICD-10-CM

## 2021-04-28 DIAGNOSIS — E11.621 DIABETIC ULCER OF LEFT MIDFOOT ASSOCIATED WITH TYPE 2 DIABETES MELLITUS, WITH BONE INVOLVEMENT WITHOUT EVIDENCE OF NECROSIS (HCC): ICD-10-CM

## 2021-04-28 DIAGNOSIS — B02.9 HERPES ZOSTER WITHOUT COMPLICATION: ICD-10-CM

## 2021-04-28 DIAGNOSIS — I25.10 CORONARY ARTERY DISEASE INVOLVING NATIVE CORONARY ARTERY OF NATIVE HEART WITHOUT ANGINA PECTORIS: ICD-10-CM

## 2021-04-28 DIAGNOSIS — I25.10 CORONARY ARTERY DISEASE INVOLVING NATIVE HEART WITHOUT ANGINA PECTORIS, UNSPECIFIED VESSEL OR LESION TYPE: ICD-10-CM

## 2021-04-28 DIAGNOSIS — E11.22 TYPE 2 DIABETES MELLITUS WITH STAGE 3 CHRONIC KIDNEY DISEASE, WITH LONG-TERM CURRENT USE OF INSULIN, UNSPECIFIED WHETHER STAGE 3A OR 3B CKD (HCC): Primary | ICD-10-CM

## 2021-04-28 DIAGNOSIS — C67.9 MALIGNANT NEOPLASM OF URINARY BLADDER, UNSPECIFIED SITE (HCC): ICD-10-CM
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
## 2021-04-28 DIAGNOSIS — N18.30 TYPE 2 DIABETES MELLITUS WITH STAGE 3 CHRONIC KIDNEY DISEASE, WITH LONG-TERM CURRENT USE OF INSULIN, UNSPECIFIED WHETHER STAGE 3A OR 3B CKD (HCC): Primary | ICD-10-CM

## 2021-04-28 DIAGNOSIS — E55.9 VITAMIN D DEFICIENCY: ICD-10-CM

## 2021-04-28 DIAGNOSIS — Z00.00 HEALTHCARE MAINTENANCE: ICD-10-CM

## 2021-04-28 DIAGNOSIS — E78.2 MIXED HYPERLIPIDEMIA: ICD-10-CM

## 2021-04-28 DIAGNOSIS — M81.0 OSTEOPOROSIS, UNSPECIFIED OSTEOPOROSIS TYPE, UNSPECIFIED PATHOLOGICAL FRACTURE PRESENCE: ICD-10-CM

## 2021-04-28 PROCEDURE — 99214 OFFICE O/P EST MOD 30 MIN: CPT | Performed by: INTERNAL MEDICINE

## 2021-04-28 PROCEDURE — G0439 PPPS, SUBSEQ VISIT: HCPCS | Performed by: INTERNAL MEDICINE

## 2021-04-28 RX ORDER — VALACYCLOVIR HYDROCHLORIDE 1 G/1
1000 TABLET, FILM COATED ORAL 2 TIMES DAILY
Qty: 14 TABLET | Refills: 0 | Status: SHIPPED | OUTPATIENT
Start: 2021-04-28 | End: 2021-10-12

## 2021-04-28 RX ORDER — FUROSEMIDE 20 MG/1
TABLET ORAL
Qty: 90 TABLET | Refills: 1 | Status: SHIPPED | OUTPATIENT
Start: 2021-04-28 | End: 2021-11-10

## 2021-04-28 NOTE — PROGRESS NOTES
Assessment and Plan:     Problem List Items Addressed This Visit        Endocrine    Type 2 diabetes mellitus, with long-term current use of insulin ([facility] ) - Primary    Diabetic ulcer of left foot associated with type 2 diabetes mellitus, 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 ([facility] )       Cardiovascular and Mediastinum    CAD (coronary artery disease)    Hypertension with renal disease       Genitourinary    Malignant tumor of urinary bladder ([facility] )       Other    Hyperlipidemia    Moderate major depression, single episode ([facility] )        BMI Counseling: Body mass index is 29 21 kg/m²  The BMI is above normal  Nutrition recommendations include decreasing portion sizes, encouraging healthy choices of fruits and vegetables, decreasing fast food intake, consuming healthier snacks, limiting drinks that contain sugar, moderation in carbohydrate intake, increasing intake of lean protein, reducing intake of saturated and trans fat and reducing intake of cholesterol  Exercise recommendations include exercising 3-5 times per week  No pharmacotherapy was ordered  Patient referred to PCP due to patient being overweight  Preventive health issues were discussed with patient, and age appropriate screening tests were ordered as noted in patient's After Visit Summary  Personalized health advice and appropriate referrals for health education or preventive services given if needed, as noted in patient's After Visit Summary       History of Present Illness:     Patient presents for Medicare Annual Wellness visit    Patient Care Team:  Neeru Ashford MD as PCP - General  Krista Brunson DO as PCP - Endocrinology (Endocrinology)  Nancy Gould MD Vic Gens, DO Rebbecca Lords, DO Gareth Santiago, MD Rollo Fairly, MD (Vascular Surgery)     Problem List:     Patient Active Problem List   Diagnosis    CAD (coronary artery disease)    Carcinoma in situ of bladder    Hypertension with renal disease    Hyperlipidemia    Presence of stent in coronary artery    Type 2 diabetes mellitus, with long-term current use of insulin (HCC)    CKD (chronic kidney disease) stage 3, GFR 30-59 ml/min ([facility])    Primary osteoarthritis of left knee    Malignant tumor of urinary bladder ([facility])    Cystitis due to intravesical BCG administration    Degeneration of lumbar intervertebral disc    Back pain    Arteriosclerosis of artery of extremity ([facility])    Stable angina pectoris (HCC)    Herpes zoster without complication    Localized edema    Superficial phlebitis    Preop examination    Acute cystitis without hematuria    Secondary renal hyperparathyroidism (HCC)    Bladder cancer ([facility] )    Abnormal MRI, lumbar spine    Diabetic polyneuropathy associated with type 2 diabetes mellitus ([facility] )    Dysuria    Diabetic ulcer of left foot associated with type 2 diabetes mellitus, 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 ([facility])    CKD (chronic kidney disease) stage 4, GFR 15-29 ml/min ([facility])    Chronic anemia    Anemia of chronic disease    Preoperative examination    PAD (peripheral artery disease) ([facility])    Left carotid bruit    Gross hematuria    Chronic bronchitis ([facility])    Moderate major depression, single episode ([facility])    Thrombocytopenia ([facility])    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    Lumbar spondylosis      Past Medical and Surgical History:     Past Medical History:   Diagnosis Date    Anemia     Last assessed: 9/28/17    Anxiety     Arteriosclerotic cardiovascular disease     Last assessed: 9/28/17    Arthritis     Bladder cancer (HCC)     bladder- had BCG treatments    Chronic kidney disease     CKD (chronic kidney disease) stage 4, GFR 15-29 ml/min ([facility])     Colon polyp     Coronary artery disease     7 stents    Depression     Diabetes mellitus (HCC)     IDDM    GERD (gastroesophageal reflux disease)     Glaucoma     History of fusion of cervical spine     Hyperlipidemia     Hypertension     Insomnia     Last assessed: 11/14/12    Loss of hearing     has hearing aids but usually does not wear them    Other seasonal allergic rhinitis     Last assessed: 2/10/16    PAD (peripheral artery disease) (HCC)     Shortness of breath     on exertion    Spinal stenosis of lumbar region     Transient cerebral ischemia     Uses walker     w/c for longer distances     Past Surgical History:   Procedure Laterality Date    CARDIAC SURGERY      Cath stent placement  Last assessed: 3/9/17  Interventional Catheterization    CHOLECYSTECTOMY      COLONOSCOPY      CYSTOSCOPY      Diagnostic w/biopsy  Erling Mage  Last assessed: 12/1/14    CYSTOURETHROSCOPY      w/cautery  Erling Mage    GASTRIC BYPASS      For morbid obesity w/Shaji-en-Y   Resolved: 11/17/09    INCISION AND DRAINAGE OF WOUND Right 2/26/2017    Procedure: INCISION AND DRAINAGE (I&D) EXTREMITY WITH APPLICATION OF GRAFT JACKET;  Surgeon: Donnell Roy DPM;  [facility];  Service:     INCISION AND DRAINAGE OF WOUND Right 4/25/2017    Procedure: INCISION AND DRAINAGE (I&D) EXTREMITY, APPLICATION OF GRAFT;  Surgeon: Donnell Roy DPM;  [facility];  Service:     IR BIOPSY OTHER  7/2/2020    IR LOWER EXTREMITY ANGIOGRAM  2/8/2021    IR LOWER EXTREMITY ANGIOGRAM  2/11/2021    IR TUNNELED CENTRAL LINE PLACEMENT  12/24/2020    JOINT REPLACEMENT      christofer knees replaced    AZ AMPUTATION METATARSAL+TOE,SINGLE Left 12/21/2020    Procedure: RAY RESECTION FOOT;  Surgeon: Claudeen Atkinson, DPM;  [facility];  Service: Podiatry    AZ AMPUTATION METATARSAL+TOE,SINGLE Left 12/31/2020    Procedure: 5TH MET RESECTION;  Surgeon: Claudeen Atkinson, DPM;  [facility];  Service: Podiatry    AZ CYSTOURETHROSCOPY W/IRRIG & EVAC CLOTS N/A 2/10/2021    Procedure: CYSTOSCOPY EVACUATION OF CLOTS, fulguration;  Surgeon: Monica France MD;  [facility];  Service: Urology    AZ CYSTOURETHROSCOPY,BIOPSY N/A 8/16/2016    Procedure: CYSTOSCOPY WITH BIOPSIES;  Surgeon: Faustino Stoddard MD;  [facility];  Service: Urology    IN CYSTOURETHROSCOPY,FULGUR <0 5 CM LESN N/A 11/19/2020    Procedure: CYSTO W/BIOPSIES, transurethral prostate bx;  Surgeon: Philomena Bonner MD;  [facility];  Service: Urology    IN [address] 3RD+ ORD SLCTV ABDL PEL/LXTR [facility] Left 2/8/2021    Procedure: LEG angiogram, CO2 w/limited contrast with balloon angioplasty postertior tibial artery;  Surgeon: Santana Morin MD;  [facility];  Service: Vascular    ROTATOR CUFF REPAIR      SMALL INTESTINE SURGERY      Surgery Shaji-en-Y    SPINAL FUSION      lumbar and cervical fusions    VAC DRESSING APPLICATION Right 5/98/5348    Procedure: APPLICATION VAC DRESSING;  Surgeon: Alva Burleson DPM;  [facility];  Service:    [address] [place] [address] Left 2/16/2021    Procedure: FOOT DEBRIDE, [address] OUT w/graft application;  Surgeon: Alva Burleson DPM;  [facility];  Service: Podiatry      Family History:     Family History   Problem Relation Age of Onset    Diabetes Mother     Heart disease Mother     Other Mother         High blood pressure    Heart disease Father     Diabetes Sister     Other Sister         High blood pressure    Kidney disease Sister     Heart disease Brother     Other Brother         High blood pressure      Social History:     E-Cigarette/Vaping    E-Cigarette Use Never User      E-Cigarette/Vaping Substances    Nicotine No     THC No     CBD No     Flavoring No     Other No     Unknown No      Social History     Socioeconomic History    Marital status: /Civil Union     Spouse name: None    Number of children: None    Years of education: None    Highest education level: None   Occupational History    None   Social Needs    Financial resource strain: None    Food insecurity     Worry: None     Inability: None    Transportation needs Medical: None     Non-medical: None   Tobacco Use    Smoking status: Former Smoker     Quit date: 1970     Years since quittin 3    Smokeless tobacco: Never Used   Substance and Sexual Activity    Alcohol use: Not Currently     Frequency: 2-3 times a week     Drinks per session: 1 or 2     Binge frequency: Never     Comment: beer / liquor    Drug use: Not Currently     Types: Marijuana     Comment: quit 2019 had medical marijuana    Sexual activity: Not Currently   Lifestyle    Physical activity     Days per week: None     Minutes per session: None    Stress: None   Relationships    Social connections     Talks on phone: None     Gets together: None     Attends Congregational service: None     Active member of club or organization: None     Attends meetings of clubs or organizations: None     Relationship status: None    Intimate partner violence     Fear of current or ex partner: None     Emotionally abused: None     Physically abused: None     Forced sexual activity: None   Other Topics Concern    None   Social History Narrative    Consumes 1 cup of coffee and 1 soda per day      Medications and Allergies:     Current Outpatient Medications   Medication Sig Dispense Refill    acetaminophen (TYLENOL) 325 mg tablet Take 650 mg by mouth every 6 (six) hours as needed for mild pain      ALPRAZolam (XANAX) 0 25 mg tablet At twice a day as needed for anxiety (Patient not taking: Reported on 2021) 30 tablet 0    amoxicillin (AMOXIL) 500 mg capsule       aspirin 81 mg chewable tablet Chew 1 tablet (81 mg total) daily 30 tablet 0    Azelastine HCl 0 15 % SOLN Inhale 1 spray 2 (two) times a day (Patient taking differently: Inhale 1 spray 2 (two) times a day as needed ) 30 mL 3    Bimatoprost (LUMIGAN OP) Apply to eye      bismuth subsalicylate (PEPTO BISMOL) 262 MG chewable tablet Chew 524 mg daily as needed for indigestion        calcitriol (ROCALTROL) 0 25 mcg capsule Take by mouth daily       Cholecalciferol (Vitamin D3) 1 25 MG ([address]372 UT) CAPS TAKE 1 CAPSULE BY MOUTH EVERY 30 DAYS 12 capsule 0    docusate sodium (COLACE) 100 mg capsule Take 1 capsule (100 mg total) by mouth 2 (two) times a day 10 capsule 0    DULoxetine (CYMBALTA) 20 mg capsule Take 1 capsule (20 mg total) by mouth daily 30 capsule 1    ezetimibe (ZETIA) 10 mg tablet 1 tablet      Ferrous Sulfate Dried (Feosol) 200 (65 Fe) MG TABS Take 65 mg by mouth daily 30 each 1    fluocinonide (LIDEX) 0 05 % cream Apply topically 2 (two) times a day      fluticasone (FLONASE) 50 mcg/act nasal spray One spray in each nostril twice a day 1 Bottle 3    furosemide (LASIX) 20 mg tablet TAKE 1 TABLET BY MOUTH AS NEEDED FOR EDEMA 90 tablet 1    gabapentin (NEURONTIN) 300 mg capsule Take 1 capsule (300 mg total) by mouth daily at bedtime 90 capsule 3    gabapentin (NEURONTIN) 600 MG tablet Take 300 mg by mouth 3 (three) times a day      Insulin Pen Needle 31G X 8 MM MISC Inject 3 times a day 100 each 2    isosorbide mononitrate (IMDUR) 30 mg 24 hr tablet Take 3 tablets (90 mg total) by mouth daily 90 tablet 0    Lantus SoloStar 100 units/mL injection pen 15 units qhs (Patient taking differently: 17 units qhs) 10 pen 1    latanoprost (XALATAN) 0 005 % ophthalmic solution Administer 1 drop to both eyes daily at bedtime      loperamide (IMODIUM) 2 mg capsule Take 2 mg by mouth 4 (four) times a day as needed for diarrhea      metoprolol succinate (TOPROL-XL) 100 mg 24 hr tablet Take 1 tablet (100 mg total) by mouth daily 30 tablet 0    Mirabegron ER (Myrbetriq) 50 MG TB24 Take by mouth daily       multivitamin (THERAGRAN) TABS Take 1 tablet by mouth daily        NovoLOG FlexPen 100 units/mL injection pen 10 units with each meal  (Patient not taking: Reported on 4/8/2021) 5 pen 1    omeprazole (PriLOSEC) 20 mg delayed release capsule Take 20 mg by mouth daily      OXYCODONE HCL ER PO Take by mouth      pantoprazole (PROTONIX) 40 mg tablet Take 1 tablet (40 mg total) by mouth daily in the early morning 30 tablet 0    sertraline (ZOLOFT) 50 mg tablet Take 1 tablet (50 mg total) by mouth daily 30 tablet 3    tamsulosin (FLOMAX) 0 4 mg Take 1 capsule (0 4 mg total) by mouth daily at bedtime 30 capsule 0     No current facility-administered medications for this visit  Allergies   Allergen Reactions    Atorvastatin Hives, Itching and Rash    Simvastatin Rash and Edema     "ALL STATINS"    Statins Itching    Insulin Lispro Swelling and Edema    Medical Tape      rash to electrodes    Other Itching, Rash and Other (See Comments)     rash to electrodes and adhesives      Immunizations:     Immunization History   Administered Date(s) Administered    DT (pediatric) 01/24/2014    DTaP 5 07/01/2007    H1N1, All Formulations 12/16/2009    INFLUENZA 10/12/2007, 10/06/2008, 11/01/2008, 09/14/2009, 09/30/2010, 10/01/2011, 10/09/2012, 11/10/2013, 12/01/2014, 01/26/2015, 01/23/2017, 09/28/2017, 10/15/2018, 01/04/2019, 10/29/2019    Influenza Quadrivalent, 6-35 Months IM 11/14/2014    Influenza Split High Dose Preservative Free IM 01/23/2017, 09/28/2017    Influenza, high dose seasonal 0 7 mL 11/01/2018, 10/09/2020    Influenza, seasonal, injectable 10/06/2011, 11/13/2013, 09/15/2015    Influenza, seasonal, injectable, preservative free 09/23/2015    Pneumococcal 01/01/2007, 10/01/2017    Pneumococcal Conjugate 13-Valent 08/04/2015, 09/23/2015, 10/01/2017    Pneumococcal Polysaccharide PPV23 03/01/2004, 11/12/2008, 07/10/2013, 09/15/2017    SARS-CoV-2 / COVID-19 mRNA IM ([facility]) 02/26/2021, 03/18/2021    Td (adult), Unspecified 09/30/2010    Tdap 08/15/2014    Tuberculin Skin Test-PPD Intradermal 05/08/2019      Health Maintenance: There are no preventive care reminders to display for this patient  There are no preventive care reminders to display for this patient     Medicare Health Risk Assessment:     /90 (BP Location: Left arm, Patient Position: Sitting, Cuff Size: Standard)   Pulse 80   Temp 97 5 °F (36 4 °C)   Resp 12   Ht 6' 4" (1 93 m)   Wt 109 kg (240 lb)   BMI 29 21 kg/m²      Chiara Bowles is here for his Subsequent Wellness visit  Health Risk Assessment:   Patient rates overall health as poor  Patient feels that their physical health rating is slightly worse  Patient is dissatisfied with their life  Eyesight was rated as slightly worse  Hearing was rated as slightly worse  Patient feels that their emotional and mental health rating is much worse  Patients states they are sometimes angry  Patient states they are sometimes unusually tired/fatigued  Pain experienced in the last 7 days has been a lot  Patient's pain rating has been 8/10  Patient states that he has experienced no weight loss or gain in last 6 months  The most important thing is to take care of his foot this particular time and later on will start with therapy physical therapy for gait dysfunction    Depression Screening:   PHQ-2 Score: 0  PHQ-9 Score: 3      Fall Risk Screening: In the past year, patient has experienced: history of falling in past year    Number of falls: 2 or more  Injured during fall?: Yes      Home Safety:  Patient has trouble with stairs inside or outside of their home  Patient has working smoke alarms and has working carbon monoxide detector  Home safety hazards include: not having non-slip bath and/or shower mats  Nutrition:   Current diet is Regular, No Added Salt and Limited junk food  Medications:   Patient is currently taking over-the-counter supplements  OTC medications include: see medication list  Patient is able to manage medications  Activities of Daily Living (ADLs)/Instrumental Activities of Daily Living (IADLs):   Walk and transfer into and out of bed and chair?: Yes  Dress and groom yourself?: Yes    Bathe or shower yourself?: Yes    Feed yourself?  Yes  Do your laundry/housekeeping?: Yes  Manage your money, pay your bills and track your expenses?: Yes  Make your own meals?: Yes    Do your own shopping?: Yes    Previous Hospitalizations:   Any hospitalizations or ED visits within the last 12 months?: Yes    How many hospitalizations have you had in the last year?: more than 4    Hospitalization Comments: 10 times multiple comorbidities including diabetes neuropathy chronic renal failure bladder cancer back pain    Advance Care Planning:   Living will: No    Durable POA for healthcare: No    Advanced directive: No    End of Life Decisions reviewed with patient: Yes    Provider agrees with end of life decisions: Yes      Cognitive Screening:   Provider or family/friend/caregiver concerned regarding cognition?: No    PREVENTIVE SCREENINGS      Cardiovascular Screening:    General: Screening Not Indicated and History Lipid Disorder      Diabetes Screening:     General: Screening Not Indicated and History Diabetes      Prostate Cancer Screening:    General: Screening Not Indicated      Osteoporosis Screening:    General: Risks and Benefits Discussed      Abdominal Aortic Aneurysm (AAA) Screening:    Risk factors include: tobacco use        Lung Cancer Screening:     General: Screening Not Indicated      Hepatitis C Screening:    General: Risks and Benefits Discussed    Hep C Screening Accepted: Yes      Screening, Brief Intervention, and Referral to Treatment (SBIRT)    Screening    Typical number of drinks in a week: 3    Single Item Drug Screening:  How often have you used an illegal drug (including marijuana) or a prescription medication for non-medical reasons in the past year? never    Single Item Drug Screen Score: 0  Interpretation: Negative screen for possible drug use disorder    Other Counseling Topics:   Car/seat belt/driving safety, skin self-exam, sunscreen and calcium and vitamin D intake and regular weightbearing exercise         Johnnye Castleman, MD

## 2021-04-28 NOTE — PATIENT INSTRUCTIONS
Medicare Preventive Visit Patient Instructions  Thank you for completing your Welcome to Medicare Visit or Medicare Annual Wellness Visit today  Your next wellness visit will be due in one year (4/29/2022)  The screening/preventive services that you may require over the next 5-10 years are detailed below  Some tests may not apply to you based off risk factors and/or age  Screening tests ordered at today's visit but not completed yet may show as past due  Also, please note that scanned in results may not display below  Preventive Screenings:  Service Recommendations Previous Testing/Comments   Colorectal Cancer Screening  · Colonoscopy    · Fecal Occult Blood Test (FOBT)/Fecal Immunochemical Test (FIT)  · Fecal DNA/Cologuard Test  · Flexible Sigmoidoscopy Age: 54-65 years old   Colonoscopy: every 10 years (May be performed more frequently if at higher risk)  OR  FOBT/FIT: every 1 year  OR  Cologuard: every 3 years  OR  Sigmoidoscopy: every 5 years  Screening may be recommended earlier than age 48 if at higher risk for colorectal cancer  Also, an individualized decision between you and your healthcare provider will decide whether screening between the ages of 74-80 would be appropriate   Colonoscopy: 05/11/2016  FOBT/FIT: Not on file  Cologuard: Not on file  Sigmoidoscopy: Not on file          Prostate Cancer Screening Individualized decision between patient and health care provider in men between ages of 53-78   Medicare will cover every 12 months beginning on the day after your 50th birthday PSA: No results in last 5 years     Screening Not Indicated     Hepatitis C Screening Once for adults born between [birth year] and 1965  More frequently in patients at high risk for Hepatitis C Hep C Antibody: Not on file        Diabetes Screening 1-2 times per year if you're at risk for diabetes or have pre-diabetes Fasting glucose: 104 mg/dL   A1C: 7 0 %    Screening Not Indicated  History Diabetes   Cholesterol Screening Once every 5 years if you don't have a lipid disorder  May order more often based on risk factors  Lipid panel: 02/04/2021    Screening Not Indicated  History Lipid Disorder      Other Preventive Screenings Covered by Medicare:  1  Abdominal Aortic Aneurysm (AAA) Screening: covered once if your at risk  You're considered to be at risk if you have a family history of AAA or a male between the age of 73-68 who smoking at least 100 cigarettes in your lifetime  2  Lung Cancer Screening: covers low dose CT scan once per year if you meet all of the following conditions: (1) Age 50-69; (2) No signs or symptoms of lung cancer; (3) Current smoker or have quit smoking within the last 15 years; (4) You have a tobacco smoking history of at least 30 pack years (packs per day x number of years you smoked); (5) You get a written order from a healthcare provider  3  Glaucoma Screening: covered annually if you're considered high risk: (1) You have diabetes OR (2) Family history of glaucoma OR (3)  aged 48 and older OR (3)  American aged 72 and older  3  Osteoporosis Screening: covered every 2 years if you meet one of the following conditions: (1) Have a vertebral abnormality; (2) On glucocorticoid therapy for more than 3 months; (3) Have primary hyperparathyroidism; (4) On osteoporosis medications and need to assess response to drug therapy  5  HIV Screening: covered annually if you're between the age of 12-76  Also covered annually if you are younger than 13 and older than 72 with risk factors for HIV infection  For pregnant patients, it is covered up to 3 times per pregnancy      Immunizations:  Immunization Recommendations   Influenza Vaccine Annual influenza vaccination during flu season is recommended for all persons aged >= 6 months who do not have contraindications   Pneumococcal Vaccine (Prevnar and Pneumovax)  * Prevnar = PCV13  * Pneumovax = PPSV23 Adults 25-60 years old: 1-3 doses may be recommended based on certain risk factors  Adults 72 years old: Prevnar (PCV13) vaccine recommended followed by Pneumovax (PPSV23) vaccine  If already received PPSV23 since turning 65, then PCV13 recommended at least one year after PPSV23 dose  Hepatitis B Vaccine 3 dose series if at intermediate or high risk (ex: diabetes, end stage renal disease, liver disease)   Tetanus (Td) Vaccine - COST NOT COVERED BY MEDICARE PART B Following completion of primary series, a booster dose should be given every 10 years to maintain immunity against tetanus  Td may also be given as tetanus wound prophylaxis  Tdap Vaccine - COST NOT COVERED BY MEDICARE PART B Recommended at least once for all adults  For pregnant patients, recommended with each pregnancy  Shingles Vaccine (Shingrix) - COST NOT COVERED BY MEDICARE PART B  2 shot series recommended in those aged 48 and above     Health Maintenance Due:  There are no preventive care reminders to display for this patient  Immunizations Due:  There are no preventive care reminders to display for this patient  Advance Directives   What are advance directives? Advance directives are legal documents that state your wishes and plans for medical care  These plans are made ahead of time in case you lose your ability to make decisions for yourself  Advance directives can apply to any medical decision, such as the treatments you want, and if you want to donate organs  What are the types of advance directives? There are many types of advance directives, and each state has rules about how to use them  You may choose a combination of any of the following:  · Living will: This is a written record of the treatment you want  You can also choose which treatments you do not want, which to limit, and which to stop at a certain time  This includes surgery, medicine, IV fluid, and tube feedings  · Durable power of  for healthcare [facility]):   This is a written record that states who you want to make healthcare choices for you when you are unable to make them for yourself  This person, called a proxy, is usually a family member or a friend  You may choose more than 1 proxy  · Do not resuscitate (DNR) order:  A DNR order is used in case your heart stops beating or you stop breathing  It is a request not to have certain forms of treatment, such as CPR  A DNR order may be included in other types of advance directives  · Medical directive: This covers the care that you want if you are in a coma, near death, or unable to make decisions for yourself  You can list the treatments you want for each condition  Treatment may include pain medicine, surgery, blood transfusions, dialysis, IV or tube feedings, and a ventilator (breathing machine)  · Values history: This document has questions about your views, beliefs, and how you feel and think about life  This information can help others choose the care that you would choose  Why are advance directives important? An advance directive helps you control your care  Although spoken wishes may be used, it is better to have your wishes written down  Spoken wishes can be misunderstood, or not followed  Treatments may be given even if you do not want them  An advance directive may make it easier for your family to make difficult choices about your care  Fall Prevention    Fall prevention  includes ways to make your home and other areas safer  It also includes ways you can move more carefully to prevent a fall  Health conditions that cause changes in your blood pressure, vision, or muscle strength and coordination may increase your risk for falls  Medicines may also increase your risk for falls if they make you dizzy, weak, or sleepy  Fall prevention tips:   · Stand or sit up slowly  · Use assistive devices as directed  · Wear shoes that fit well and have soles that   · Wear a personal alarm  · Stay active  · Manage your medical conditions      Home Safety Tips:  · Add items to prevent falls in the bathroom  · Keep paths clear  · Install bright lights in your home  · Keep items you use often on shelves within reach  · Paint or place reflective tape on the edges of your stairs  Weight Management   Why it is important to manage your weight:  Being overweight increases your risk of health conditions such as heart disease, high blood pressure, type 2 diabetes, and certain types of cancer  It can also increase your risk for osteoarthritis, sleep apnea, and other respiratory problems  Aim for a slow, steady weight loss  Even a small amount of weight loss can lower your risk of health problems  How to lose weight safely:  A safe and healthy way to lose weight is to eat fewer calories and get regular exercise  You can lose up about 1 pound a week by decreasing the number of calories you eat by 500 calories each day  Healthy meal plan for weight management:  A healthy meal plan includes a variety of foods, contains fewer calories, and helps you stay healthy  A healthy meal plan includes the following:  · Eat whole-grain foods more often  A healthy meal plan should contain fiber  Fiber is the part of grains, fruits, and vegetables that is not broken down by your body  Whole-grain foods are healthy and provide extra fiber in your diet  Some examples of whole-grain foods are whole-wheat breads and pastas, oatmeal, brown rice, and bulgur  · Eat a variety of vegetables every day  Include dark, leafy greens such as spinach, kale, alexsander greens, and mustard greens  Eat yellow and orange vegetables such as carrots, sweet potatoes, and winter squash  · Eat a variety of fruits every day  Choose fresh or canned fruit (canned in its own juice or light syrup) instead of juice  Fruit juice has very little or no fiber  · Eat low-fat dairy foods  Drink fat-free (skim) milk or 1% milk  Eat fat-free yogurt and low-fat cottage cheese   Try low-fat cheeses such as mozzarella and other reduced-fat cheeses  · Choose meat and other protein foods that are low in fat  Choose beans or other legumes such as split peas or lentils  Choose fish, skinless poultry (chicken or turkey), or lean cuts of red meat (beef or pork)  Before you cook meat or poultry, cut off any visible fat  · Use less fat and oil  Try baking foods instead of frying them  Add less fat, such as margarine, sour cream, regular salad dressing and mayonnaise to foods  Eat fewer high-fat foods  Some examples of high-fat foods include french fries, doughnuts, ice cream, and cakes  · Eat fewer sweets  Limit foods and drinks that are high in sugar  This includes candy, cookies, regular soda, and sweetened drinks  Exercise:  Exercise at least 30 minutes per day on most days of the week  Some examples of exercise include walking, biking, dancing, and swimming  You can also fit in more physical activity by taking the stairs instead of the elevator or parking farther away from stores  Ask your healthcare provider about the best exercise plan for you  © Copyright [employer] 2018 Information is for End User's use only and may not be sold, redistributed or otherwise used for commercial purposes  All illustrations and images included in CareNotes® are the copyrighted property of A D A M , [employer]  or Vandana Meza    Follow-up with podiatrist  Herpes zoster will start on Valtrex 1 g twice a day for 1 week use the steroid cream on the rash twice a day days  Herpes Zoster   WHAT YOU SHOULD KNOW:   Herpes zoster (HZ) is also called shingles  It is an infection caused by the same virus that causes chickenpox (varicella-zoster virus)  After you get chickenpox, the virus stays in your body for several years without causing any symptoms  HZ occurs when the virus becomes active again  Once active, the virus will travel along a nerve to your skin and cause a rash          CARE AGREEMENT:   You have the right to help plan your care  Learn about your health condition and how it may be treated  Discuss treatment options with your caregivers to decide what care you want to receive  You always have the right to refuse treatment  RISKS:   If left untreated, HZ may cause eye problems, such as a drooping eyelid or blindness  It may lead to a brain infection or stroke  HZ can also cause nerve damage and lead to twitching, dizziness, or loss of taste and hearing  The blisters may leave scars or changes in skin color  HZ may cause pain even after the rash is gone  It may also lead to trouble moving parts of your body  WHILE YOU ARE HERE:   Informed consent  is a legal document that explains the tests, treatments, or procedures that you may need  Informed consent means you understand what will be done and can make decisions about what you want  You give your permission when you sign the consent form  You can have someone sign this form for you if you are not able to sign it  You have the right to understand your medical care in words you know  Before you sign the consent form, understand the risks and benefits of what will be done  Make sure all your questions are answered  Medicines:   · Antiviral medicine: These help decrease symptoms and healing time  They may also decrease your risk of developing nerve pain  You will need to start taking them within 3 days of the start of symptoms to prevent nerve pain  · Pain medicine: You may need NSAIDs, acetaminophen, or opioid medicine depending on how much pain you are in  Do not wait until the pain is severe before you ask for more pain medicine  · Topical anesthetics: These are used to numb the skin and decrease pain  They can be a cream, gel, spray, or patch  · Anticonvulsants: These decrease nerve pain and may help you sleep at night  · Antidepressants: These may also be used to decrease nerve pain  · Epidural medicine: This is put into your spine to block pain  This medicine treats severe pain that does not get better with other pain medicines  Epidural medicine includes numbing medicine and steroids  Tests:  Your caregiver may send skin scrapings or fluid from your blisters for tests to see if you have HZ  © 2014 [employer] is for End User's use only and may not be sold, redistributed or otherwise used for commercial purposes  All illustrations and images included in CareNotes® are the copyrighted property of [employer] M [employer]  or Salvador Cooper  The above information is an  only  It is not intended as medical advice for individual conditions or treatments  Talk to your doctor, nurse or pharmacist before following any medical regimen to see if it is safe and effective for you

## 2021-04-28 NOTE — PROGRESS NOTES
Assessment/Plan:    1  Blood pressure well control no change in medication or plan I got a blood pressure 110/70 left arm sitting  Metoprolol succinate 100 mg  Isosorbide mononitrate 30 mg    2  Peripheral vascular disease neuropathy neurologic gait dysfunction and ulcerations of the foot being followed by Podiatry has an appointment tomorrow    Sachin Cruz last night tripping up the steps  He will start physical therapy for gait dysfunction after he gets done with the treatment of his foot    Recently has had an injection for his at back pain with pain management still has a back pain  Ambulating with a walker    History of syncope without any recurrences  Complaining of a rash on the right side of his neck and supraclavicular area follows the dermatomes looks a early vesicular consistent with herpes zoster will start treatment with Valisone ointment to be applied twice a day to the rash and  Valtrex 1 g twice a day renally adjusted dose  No problem-specific Assessment & Plan notes found for this encounter  Diagnoses and all orders for this visit:    Type 2 diabetes mellitus with stage 3 chronic kidney disease, with long-term current use of insulin, unspecified whether stage 3a or 3b CKD ([facility] [address] )    Diabetic ulcer of left midfoot associated with type 2 diabetes mellitus, 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 ([facility] [address] )    Hypertension with renal disease    Coronary artery disease involving native coronary artery of native heart without angina pectoris    Malignant neoplasm of urinary bladder, unspecified site ([facility] [address] )    Mixed hyperlipidemia    Moderate major depression, single episode ([address] )          Subjective:      Patient ID: Aryan Mcclelland is a 64 y o  male  No chief complaint on file          Current Outpatient Medications:     acetaminophen (TYLENOL) 325 mg tablet, Take 650 mg by mouth every 6 (six) hours as needed for mild pain, Disp: , Rfl:     ALPRAZolam (XANAX) 0 25 mg tablet, At twice a day as needed for anxiety (Patient not taking: Reported on 4/8/2021), Disp: 30 tablet, Rfl: 0    amoxicillin (AMOXIL) 500 mg capsule, , Disp: , Rfl:     aspirin 81 mg chewable tablet, Chew 1 tablet (81 mg total) daily, Disp: 30 tablet, Rfl: 0    Azelastine HCl 0 15 % SOLN, Inhale 1 spray 2 (two) times a day (Patient taking differently: Inhale 1 spray 2 (two) times a day as needed ), Disp: 30 mL, Rfl: 3    Bimatoprost (LUMIGAN OP), Apply to eye, Disp: , Rfl:     bismuth subsalicylate (PEPTO BISMOL) 262 MG chewable tablet, Chew 524 mg daily as needed for indigestion  , Disp: , Rfl:     calcitriol (ROCALTROL) 0 25 mcg capsule, Take by mouth daily , Disp: , Rfl:     Cholecalciferol (Vitamin D3) 1 25 MG (24236 UT) CAPS, TAKE 1 CAPSULE BY MOUTH EVERY 30 DAYS, Disp: 12 capsule, Rfl: 0    docusate sodium (COLACE) 100 mg capsule, Take 1 capsule (100 mg total) by mouth 2 (two) times a day, Disp: 10 capsule, Rfl: 0    DULoxetine (CYMBALTA) 20 mg capsule, Take 1 capsule (20 mg total) by mouth daily, Disp: 30 capsule, Rfl: 1    ezetimibe (ZETIA) 10 mg tablet, 1 tablet, Disp: , Rfl:     Ferrous Sulfate Dried (Feosol) 200 (65 Fe) MG TABS, Take 65 mg by mouth daily, Disp: 30 each, Rfl: 1    fluocinonide (LIDEX) 0 05 % cream, Apply topically 2 (two) times a day, Disp: , Rfl:     fluticasone (FLONASE) 50 mcg/act nasal spray, One spray in each nostril twice a day, Disp: 1 Bottle, Rfl: 3    furosemide (LASIX) 20 mg tablet, TAKE 1 TABLET BY MOUTH AS NEEDED FOR EDEMA, Disp: 90 tablet, Rfl: 1    gabapentin (NEURONTIN) 300 mg capsule, Take 1 capsule (300 mg total) by mouth daily at bedtime, Disp: 90 capsule, Rfl: 3    gabapentin (NEURONTIN) 600 MG tablet, Take 300 mg by mouth 3 (three) times a day, Disp: , Rfl:     Insulin Pen Needle 31G X 8 MM MISC, Inject 3 times a day, Disp: 100 each, Rfl: 2    isosorbide mononitrate (IMDUR) 30 mg 24 hr tablet, Take 3 tablets (90 mg total) by mouth daily, Disp: 90 tablet, Rfl: 0   Lantus SoloStar 100 units/mL injection pen, 15 units qhs (Patient taking differently: 17 units qhs), Disp: 10 pen, Rfl: 1    latanoprost (XALATAN) 0 005 % ophthalmic solution, Administer 1 drop to both eyes daily at bedtime, Disp: , Rfl:     loperamide (IMODIUM) 2 mg capsule, Take 2 mg by mouth 4 (four) times a day as needed for diarrhea, Disp: , Rfl:     metoprolol succinate (TOPROL-XL) 100 mg 24 hr tablet, Take 1 tablet (100 mg total) by mouth daily, Disp: 30 tablet, Rfl: 0    Mirabegron ER (Myrbetriq) 50 MG TB24, Take by mouth daily , Disp: , Rfl:     multivitamin (THERAGRAN) TABS, Take 1 tablet by mouth daily  , Disp: , Rfl:     NovoLOG FlexPen 100 units/mL injection pen, 10 units with each meal  (Patient not taking: Reported on 4/8/2021), Disp: 5 pen, Rfl: 1    omeprazole (PriLOSEC) 20 mg delayed release capsule, Take 20 mg by mouth daily, Disp: , Rfl:     OXYCODONE HCL ER PO, Take by mouth, Disp: , Rfl:     pantoprazole (PROTONIX) 40 mg tablet, Take 1 tablet (40 mg total) by mouth daily in the early morning, Disp: 30 tablet, Rfl: 0    sertraline (ZOLOFT) 50 mg tablet, Take 1 tablet (50 mg total) by mouth daily, Disp: 30 tablet, Rfl: 3    tamsulosin (FLOMAX) 0 4 mg, Take 1 capsule (0 4 mg total) by mouth daily at bedtime, Disp: 30 capsule, Rfl: 0    HPI    The following portions of the patient's history were reviewed and updated as appropriate: allergies, current medications, past family history, past medical history, past social history, past surgical history and problem list     Review of Systems   Constitutional: Negative for chills and fever  HENT: Negative for ear pain and sore throat  Eyes: Negative for pain and visual disturbance  Respiratory: Negative for cough and shortness of breath  Cardiovascular: Negative for chest pain and palpitations  Gastrointestinal: Negative for abdominal pain and vomiting  Genitourinary: Negative for dysuria and hematuria     Musculoskeletal: Positive for back pain  Negative for arthralgias  Skin: Positive for wound  Negative for color change and rash  Neurological: Negative for seizures and syncope  All other systems reviewed and are negative  Objective: There were no vitals taken for this visit  Physical Exam  Vitals signs and nursing note reviewed  Constitutional:       Appearance: He is well-developed  HENT:      Head: Normocephalic  Right Ear: External ear normal       Left Ear: External ear normal       Nose: Nose normal       Mouth/Throat:      Pharynx: No oropharyngeal exudate  Eyes:      Conjunctiva/sclera: Conjunctivae normal       Pupils: Pupils are equal, round, and reactive to light  Neck:      Musculoskeletal: Normal range of motion and neck supple  Thyroid: No thyromegaly  Cardiovascular:      Rate and Rhythm: Normal rate and regular rhythm  Heart sounds: Normal heart sounds  No murmur  No friction rub  No gallop  Comments: S1-S2 regular rhythm +1 edema of the ankle  Pulmonary:      Effort: Pulmonary effort is normal  No respiratory distress  Breath sounds: Normal breath sounds  No wheezing or rales  Comments: Lungs are clear no wheezing rales or rhonchi  Abdominal:      General: Bowel sounds are normal  There is no distension  Palpations: Abdomen is soft  There is no mass  Tenderness: There is no abdominal tenderness  There is no guarding or rebound  Musculoskeletal: Normal range of motion  Lymphadenopathy:      Cervical: No cervical adenopathy  Skin:     General: Skin is warm and dry  Neurological:      Mental Status: He is alert and oriented to person, place, and time     Psychiatric:         Behavior: Behavior normal          Judgment: Judgment normal

## 2021-05-05 ENCOUNTER — HOSPITAL ENCOUNTER (OUTPATIENT)
Dept: BONE DENSITY | Facility: CLINIC | Age: 76
Discharge: HOME | End: 2021-05-05
Payer: MEDICARE

## 2021-05-05 DIAGNOSIS — Z00.00 HEALTHCARE MAINTENANCE: ICD-10-CM
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
## 2021-05-05 DIAGNOSIS — M81.0 OSTEOPOROSIS, UNSPECIFIED OSTEOPOROSIS TYPE, UNSPECIFIED PATHOLOGICAL FRACTURE PRESENCE: ICD-10-CM

## 2021-05-05 DIAGNOSIS — E55.9 VITAMIN D DEFICIENCY: ICD-10-CM

## 2021-05-05 PROCEDURE — 77080 DXA BONE DENSITY AXIAL: CPT

## 2021-05-07 DIAGNOSIS — E55.9 VITAMIN D DEFICIENCY: ICD-10-CM

## 2021-05-07 RX ORDER — CALCITRIOL 0.25 UG/1
0.25 CAPSULE, LIQUID FILLED ORAL DAILY
Qty: 30 CAPSULE | Refills: 1 | Status: SHIPPED | OUTPATIENT
Start: 2021-05-07 | End: 2021-08-30 | Stop reason: SDUPTHER

## 2021-05-07 RX ORDER — CHOLECALCIFEROL (VITAMIN D3) 1250 MCG
CAPSULE ORAL
Qty: 12 CAPSULE | Refills: 0 | Status: CANCELLED | OUTPATIENT
Start: 2021-05-07

## 2021-05-07 RX ORDER — MIRABEGRON 50 MG/1
50 TABLET, FILM COATED, EXTENDED RELEASE ORAL DAILY
Qty: 30 TABLET | Refills: 1 | Status: SHIPPED | OUTPATIENT
Start: 2021-05-07 | End: 2021-10-12

## 2021-05-10 ENCOUNTER — APPOINTMENT (OUTPATIENT)
Dept: LAB | Facility: HOSPITAL | Age: 76
End: 2021-05-10
Attending: INTERNAL MEDICINE
Payer: MEDICARE

## 2021-05-10 DIAGNOSIS — Z00.00 HEALTHCARE MAINTENANCE: ICD-10-CM

## 2021-05-10 PROCEDURE — 86803 HEPATITIS C AB TEST: CPT

## 2021-05-10 PROCEDURE — 36415 COLL VENOUS BLD VENIPUNCTURE: CPT

## 2021-05-11 ENCOUNTER — OFFICE VISIT (OUTPATIENT)
Dept: INTERNAL MEDICINE CLINIC | Facility: CLINIC | Age: 76
End: 2021-05-11
Payer: MEDICARE

## 2021-05-11 VITALS
WEIGHT: 236 LBS | RESPIRATION RATE: 12 BRPM | HEIGHT: 75 IN | DIASTOLIC BLOOD PRESSURE: 80 MMHG | TEMPERATURE: 97.6 F | SYSTOLIC BLOOD PRESSURE: 130 MMHG | BODY MASS INDEX: 29.34 KG/M2 | HEART RATE: 86 BPM

## 2021-05-11 DIAGNOSIS — N25.81 SECONDARY RENAL HYPERPARATHYROIDISM (HCC): ICD-10-CM

## 2021-05-11 DIAGNOSIS — E78.2 MIXED HYPERLIPIDEMIA: ICD-10-CM

## 2021-05-11 DIAGNOSIS — N18.4 CKD (CHRONIC KIDNEY DISEASE) STAGE 4, GFR 15-29 ML/MIN (HCC): ICD-10-CM

## 2021-05-11 DIAGNOSIS — N18.30 TYPE 2 DIABETES MELLITUS WITH STAGE 3 CHRONIC KIDNEY DISEASE, WITH LONG-TERM CURRENT USE OF INSULIN, UNSPECIFIED WHETHER STAGE 3A OR 3B CKD (HCC): Primary | ICD-10-CM

## 2021-05-11 DIAGNOSIS — E11.22 TYPE 2 DIABETES MELLITUS WITH STAGE 3 CHRONIC KIDNEY DISEASE, WITH LONG-TERM CURRENT USE OF INSULIN, UNSPECIFIED WHETHER STAGE 3A OR 3B CKD (HCC): Primary | ICD-10-CM

## 2021-05-11 DIAGNOSIS — F32.1 MODERATE MAJOR DEPRESSION, SINGLE EPISODE (HCC): ICD-10-CM

## 2021-05-11 DIAGNOSIS — E11.621 DIABETIC ULCER OF LEFT MIDFOOT ASSOCIATED WITH TYPE 2 DIABETES MELLITUS, WITH BONE INVOLVEMENT WITHOUT EVIDENCE OF NECROSIS (HCC): ICD-10-CM

## 2021-05-11 DIAGNOSIS — B02.9 HERPES ZOSTER WITHOUT COMPLICATION: ICD-10-CM

## 2021-05-11 DIAGNOSIS — I73.9 PAD (PERIPHERAL ARTERY DISEASE) (HCC): ICD-10-CM

## 2021-05-11 DIAGNOSIS — L97.426 DIABETIC ULCER OF LEFT MIDFOOT ASSOCIATED WITH TYPE 2 DIABETES MELLITUS, WITH BONE INVOLVEMENT WITHOUT EVIDENCE OF NECROSIS (HCC): ICD-10-CM

## 2021-05-11 DIAGNOSIS — I25.10 CORONARY ARTERY DISEASE INVOLVING NATIVE CORONARY ARTERY OF NATIVE HEART WITHOUT ANGINA PECTORIS: ICD-10-CM

## 2021-05-11 DIAGNOSIS — Z00.00 HEALTHCARE MAINTENANCE: ICD-10-CM

## 2021-05-11 DIAGNOSIS — Z12.11 COLON CANCER SCREENING: ICD-10-CM

## 2021-05-11 DIAGNOSIS — Z79.4 TYPE 2 DIABETES MELLITUS WITH STAGE 3 CHRONIC KIDNEY DISEASE, WITH LONG-TERM CURRENT USE OF INSULIN, UNSPECIFIED WHETHER STAGE 3A OR 3B CKD (HCC): Primary | ICD-10-CM

## 2021-05-11 DIAGNOSIS — C67.9 MALIGNANT NEOPLASM OF URINARY BLADDER, UNSPECIFIED SITE (HCC): ICD-10-CM

## 2021-05-11 LAB — HCV AB SER QL: NORMAL

## 2021-05-11 PROCEDURE — 99214 OFFICE O/P EST MOD 30 MIN: CPT | Performed by: INTERNAL MEDICINE

## 2021-05-11 NOTE — PROGRESS NOTES
Assessment/Plan:  Get her lab work done no change in medication or plan herpes zoster of the right side of the neck resolved tolerated Valtrex      Problem 1  Herpes zoster in the neck area tolerated Valtrex no pain some hyperpigmented pre mentation residual lesions neck area on the right    2  Coronary artery disease stable no chest palpitation shortness of breath or headache    3  Back pain has been doing quite well ambulating with a cane  Had an epidural block which was affective  He follows up with Pain Management he has me about getting another block I told him if he did not have any pain I would hold off until he has a relieve relapse  Before he gets another epidural block    4  Bladder cancer follows up with Urology was no gross hematuria no problems voiding    5  Diabetes follows up with endocrinology    6  Blood pressure was labile repeat blood pressure was a me was 130/80 no change in medication or plan  He is on the following medication  Lasix 20 mg  Isosorbide mononitrate 90 mg per day  Metoprolol succinate 100 mg daily    No change in medication or plan he is in good spirits  7  Chronic renal failure he needs to have lab work done before the next office visit I reorder the lab work again he is going to see the endocrinologist next week    Health maintenance he is due for hepatitis-C which I will order with the next lab        No problem-specific Assessment & Plan notes found for this encounter  Diagnoses and all orders for this visit:    Type 2 diabetes mellitus with stage 3 chronic kidney disease, with long-term current use of insulin, unspecified whether stage 3a or 3b CKD (HCC)  -     CBC and differential; Future  -     Comprehensive metabolic panel; Future  -     TSH, 3rd generation with Free T4 reflex; Future  -     Hemoglobin A1C; Future  -     Microalbumin / creatinine urine ratio  -     Magnesium;  Future  -     UA (URINE) with reflex to Scope  -     Vitamin D 25 hydroxy; Future    Secondary renal hyperparathyroidism (HCC)  -     CBC and differential; Future  -     Comprehensive metabolic panel; Future  -     TSH, 3rd generation with Free T4 reflex; Future  -     Hemoglobin A1C; Future  -     Microalbumin / creatinine urine ratio  -     Magnesium; Future  -     UA (URINE) with reflex to Scope  -     Vitamin D 25 hydroxy; Future    Diabetic ulcer of left midfoot associated with type 2 diabetes mellitus, 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)  -     CBC and differential; Future  -     Comprehensive metabolic panel; Future  -     TSH, 3rd generation with Free T4 reflex; Future  -     Hemoglobin A1C; Future  -     Microalbumin / creatinine urine ratio  -     Magnesium; Future  -     UA (URINE) with reflex to Scope  -     Vitamin D 25 hydroxy; Future    Coronary artery disease involving native coronary artery of native heart without angina pectoris  -     CBC and differential; Future  -     Comprehensive metabolic panel; Future  -     TSH, 3rd generation with Free T4 reflex; Future  -     Hemoglobin A1C; Future  -     Microalbumin / creatinine urine ratio  -     Magnesium; Future  -     UA (URINE) with reflex to Scope  -     Vitamin D 25 hydroxy; Future    PAD (peripheral artery disease) (HCC)  -     CBC and differential; Future  -     Comprehensive metabolic panel; Future  -     TSH, 3rd generation with Free T4 reflex; Future  -     Hemoglobin A1C; Future  -     Microalbumin / creatinine urine ratio  -     Magnesium; Future  -     UA (URINE) with reflex to Scope  -     Vitamin D 25 hydroxy; Future    Malignant neoplasm of urinary bladder, unspecified site (HCC)  -     CBC and differential; Future  -     Comprehensive metabolic panel; Future  -     TSH, 3rd generation with Free T4 reflex; Future  -     Hemoglobin A1C; Future  -     Microalbumin / creatinine urine ratio  -     Magnesium;  Future  -     UA (URINE) with reflex to Scope  -     Vitamin D 25 hydroxy; Future    CKD (chronic kidney disease) stage 4, GFR 15-29 ml/min (HCC)  -     CBC and differential; Future  -     Comprehensive metabolic panel; Future  -     TSH, 3rd generation with Free T4 reflex; Future  -     Hemoglobin A1C; Future  -     Microalbumin / creatinine urine ratio  -     Magnesium; Future  -     UA (URINE) with reflex to Scope  -     Vitamin D 25 hydroxy; Future    Moderate major depression, single episode (HCC)    Mixed hyperlipidemia  -     CBC and differential; Future  -     Comprehensive metabolic panel; Future  -     TSH, 3rd generation with Free T4 reflex; Future  -     Hemoglobin A1C; Future  -     Microalbumin / creatinine urine ratio  -     Magnesium; Future  -     UA (URINE) with reflex to Scope  -     Vitamin D 25 hydroxy; Future    Herpes zoster without complication    Colon cancer screening  -     Ambulatory referral for colonoscopy; Future          Subjective:      Patient ID: Manuel Rodgers is a 64 y o  male  Chief Complaint   Patient presents with    Follow-up     Needs eye exam            Current Outpatient Medications:     acetaminophen (TYLENOL) 325 mg tablet, Take 650 mg by mouth every 6 (six) hours as needed for mild pain, Disp: , Rfl:     ALPRAZolam (XANAX) 0 25 mg tablet, At twice a day as needed for anxiety, Disp: 30 tablet, Rfl: 0    amoxicillin (AMOXIL) 500 mg capsule, , Disp: , Rfl:     Azelastine HCl 0 15 % SOLN, Inhale 1 spray 2 (two) times a day (Patient taking differently: Inhale 1 spray 2 (two) times a day as needed ), Disp: 30 mL, Rfl: 3    betamethasone valerate (VALISONE) 0 1 % cream, Apply topically 2 (two) times a day, Disp: 30 g, Rfl: 0    Bimatoprost (LUMIGAN OP), Apply to eye, Disp: , Rfl:     bismuth subsalicylate (PEPTO BISMOL) 262 MG chewable tablet, Chew 524 mg daily as needed for indigestion  , Disp: , Rfl:     calcitriol (ROCALTROL) 0 25 mcg capsule, Take 1 capsule (0 25 mcg total) by mouth daily, Disp: 30 capsule, Rfl: 1    docusate sodium (COLACE) 100 mg capsule, Take 1 capsule (100 mg total) by mouth 2 (two) times a day, Disp: 10 capsule, Rfl: 0    DULoxetine (CYMBALTA) 20 mg capsule, Take 1 capsule (20 mg total) by mouth daily, Disp: 30 capsule, Rfl: 1    ezetimibe (ZETIA) 10 mg tablet, 1 tablet, Disp: , Rfl:     Ferrous Sulfate Dried (Feosol) 200 (65 Fe) MG TABS, Take 65 mg by mouth daily, Disp: 30 each, Rfl: 1    fluocinonide (LIDEX) 0 05 % cream, Apply topically 2 (two) times a day, Disp: , Rfl:     fluticasone (FLONASE) 50 mcg/act nasal spray, One spray in each nostril twice a day, Disp: 1 Bottle, Rfl: 3    furosemide (LASIX) 20 mg tablet, TAKE 1 TABLET BY MOUTH AS NEEDED FOR EDEMA, Disp: 90 tablet, Rfl: 1    gabapentin (NEURONTIN) 300 mg capsule, Take 1 capsule (300 mg total) by mouth daily at bedtime, Disp: 90 capsule, Rfl: 3    gabapentin (NEURONTIN) 600 MG tablet, Take 300 mg by mouth 3 (three) times a day, Disp: , Rfl:     Insulin Pen Needle 31G X 8 MM MISC, Inject 3 times a day, Disp: 100 each, Rfl: 2    Lantus SoloStar 100 units/mL injection pen, 15 units qhs (Patient taking differently: 17 units qhs), Disp: 10 pen, Rfl: 1    latanoprost (XALATAN) 0 005 % ophthalmic solution, Administer 1 drop to both eyes daily at bedtime, Disp: , Rfl:     loperamide (IMODIUM) 2 mg capsule, Take 2 mg by mouth 4 (four) times a day as needed for diarrhea, Disp: , Rfl:     Mirabegron ER (Myrbetriq) 50 MG TB24, Take 1 tablet (50 mg total) by mouth daily, Disp: 30 tablet, Rfl: 1    multivitamin (THERAGRAN) TABS, Take 1 tablet by mouth daily  , Disp: , Rfl:     NovoLOG FlexPen 100 units/mL injection pen, 10 units with each meal , Disp: 5 pen, Rfl: 1    omeprazole (PriLOSEC) 20 mg delayed release capsule, Take 20 mg by mouth daily, Disp: , Rfl:     OXYCODONE HCL ER PO, Take by mouth, Disp: , Rfl:     aspirin 81 mg chewable tablet, Chew 1 tablet (81 mg total) daily, Disp: 30 tablet, Rfl: 0    isosorbide mononitrate (IMDUR) 30 mg 24 hr tablet, Take 3 tablets (90 mg total) by mouth daily, Disp: 90 tablet, Rfl: 0    metoprolol succinate (TOPROL-XL) 100 mg 24 hr tablet, Take 1 tablet (100 mg total) by mouth daily, Disp: 30 tablet, Rfl: 0    pantoprazole (PROTONIX) 40 mg tablet, Take 1 tablet (40 mg total) by mouth daily in the early morning, Disp: 30 tablet, Rfl: 0    sertraline (ZOLOFT) 50 mg tablet, Take 1 tablet (50 mg total) by mouth daily, Disp: 30 tablet, Rfl: 3    tamsulosin (FLOMAX) 0 4 mg, Take 1 capsule (0 4 mg total) by mouth daily at bedtime, Disp: 30 capsule, Rfl: 0    valACYclovir (VALTREX) 1,000 mg tablet, Take 1 tablet (1,000 mg total) by mouth 2 (two) times a day for 7 days, Disp: 14 tablet, Rfl: 0    HPI    The following portions of the patient's history were reviewed and updated as appropriate: allergies, current medications, past family history, past medical history, past social history, past surgical history and problem list     Review of Systems   Constitutional: Negative  Negative for activity change, appetite change, fatigue, fever and unexpected weight change  HENT: Negative for congestion, ear pain, hearing loss, mouth sores, postnasal drip, rhinorrhea, sore throat, trouble swallowing and voice change  Eyes: Negative for pain, redness and visual disturbance  Respiratory: Negative for cough, chest tightness, shortness of breath and wheezing  Cardiovascular: Negative for chest pain, palpitations and leg swelling  Gastrointestinal: Negative for abdominal distention, abdominal pain, blood in stool, constipation, diarrhea and nausea  Endocrine: Negative for cold intolerance, heat intolerance, polydipsia, polyphagia and polyuria  Genitourinary: Negative for difficulty urinating, dysuria, flank pain, frequency, hematuria and urgency  Musculoskeletal: Negative for arthralgias, back pain, gait problem, joint swelling and myalgias  Skin: Negative for color change and pallor     Neurological: Negative for dizziness, tremors, seizures, syncope, weakness, numbness and headaches  Hematological: Negative for adenopathy  Does not bruise/bleed easily  Psychiatric/Behavioral: Negative  Negative for sleep disturbance  The patient is not nervous/anxious  Objective:    /80 (BP Location: Left arm, Patient Position: Sitting)   Pulse 86   Temp 97 6 °F (36 4 °C)   Resp 12   Ht 6' 2 5" (1 892 m)   Wt 107 kg (236 lb)   BMI 29 90 kg/m²      Physical Exam  Vitals signs and nursing note reviewed  Constitutional:       Appearance: He is well-developed  HENT:      Head: Normocephalic  Right Ear: External ear normal       Left Ear: External ear normal       Nose: Nose normal       Mouth/Throat:      Pharynx: No oropharyngeal exudate  Eyes:      Conjunctiva/sclera: Conjunctivae normal       Pupils: Pupils are equal, round, and reactive to light  Neck:      Musculoskeletal: Normal range of motion and neck supple  Thyroid: No thyromegaly  Cardiovascular:      Rate and Rhythm: Normal rate and regular rhythm  Heart sounds: Normal heart sounds  No murmur  No friction rub  No gallop  Comments: S1-S2 regular rhythm  Extremities no edema  Pulmonary:      Effort: Pulmonary effort is normal  No respiratory distress  Breath sounds: Normal breath sounds  No wheezing or rales  Comments: Lungs are clear no wheezing rales or rhonchi  Abdominal:      General: Bowel sounds are normal  There is no distension  Palpations: Abdomen is soft  There is no mass  Tenderness: There is no abdominal tenderness  There is no guarding or rebound  Musculoskeletal: Normal range of motion  Lymphadenopathy:      Cervical: No cervical adenopathy  Skin:     General: Skin is warm and dry  Neurological:      Mental Status: He is alert and oriented to person, place, and time     Psychiatric:         Behavior: Behavior normal          Judgment: Judgment normal

## 2021-05-17 PROBLEM — G89.4 CHRONIC PAIN SYNDROME: Status: ACTIVE | Noted: 2021-05-17

## 2021-05-18 ENCOUNTER — OFFICE VISIT (OUTPATIENT)
Dept: INTERNAL MEDICINE CLINIC | Facility: CLINIC | Age: 76
End: 2021-05-18
Payer: MEDICARE

## 2021-05-18 VITALS
RESPIRATION RATE: 12 BRPM | WEIGHT: 237 LBS | DIASTOLIC BLOOD PRESSURE: 80 MMHG | SYSTOLIC BLOOD PRESSURE: 140 MMHG | BODY MASS INDEX: 29.47 KG/M2 | HEIGHT: 75 IN | TEMPERATURE: 97.4 F | HEART RATE: 80 BPM

## 2021-05-18 DIAGNOSIS — I12.9 HYPERTENSION WITH RENAL DISEASE: ICD-10-CM

## 2021-05-18 DIAGNOSIS — L97.426 DIABETIC ULCER OF LEFT MIDFOOT ASSOCIATED WITH TYPE 2 DIABETES MELLITUS, WITH BONE INVOLVEMENT WITHOUT EVIDENCE OF NECROSIS (HCC): ICD-10-CM

## 2021-05-18 DIAGNOSIS — B02.9 HERPES ZOSTER WITHOUT COMPLICATION: ICD-10-CM

## 2021-05-18 DIAGNOSIS — E11.621 DIABETIC ULCER OF LEFT MIDFOOT ASSOCIATED WITH TYPE 2 DIABETES MELLITUS, WITH BONE INVOLVEMENT WITHOUT EVIDENCE OF NECROSIS (HCC): ICD-10-CM

## 2021-05-18 DIAGNOSIS — E11.22 TYPE 2 DIABETES MELLITUS WITH STAGE 3 CHRONIC KIDNEY DISEASE, WITH LONG-TERM CURRENT USE OF INSULIN, UNSPECIFIED WHETHER STAGE 3A OR 3B CKD (HCC): Primary | ICD-10-CM

## 2021-05-18 DIAGNOSIS — N18.30 TYPE 2 DIABETES MELLITUS WITH STAGE 3 CHRONIC KIDNEY DISEASE, WITH LONG-TERM CURRENT USE OF INSULIN, UNSPECIFIED WHETHER STAGE 3A OR 3B CKD (HCC): Primary | ICD-10-CM

## 2021-05-18 DIAGNOSIS — C67.9 MALIGNANT NEOPLASM OF URINARY BLADDER, UNSPECIFIED SITE (HCC): ICD-10-CM

## 2021-05-18 DIAGNOSIS — Z79.4 TYPE 2 DIABETES MELLITUS WITH STAGE 3 CHRONIC KIDNEY DISEASE, WITH LONG-TERM CURRENT USE OF INSULIN, UNSPECIFIED WHETHER STAGE 3A OR 3B CKD (HCC): Primary | ICD-10-CM

## 2021-05-18 DIAGNOSIS — I73.9 PAD (PERIPHERAL ARTERY DISEASE) (HCC): ICD-10-CM

## 2021-05-18 PROCEDURE — 99212 OFFICE O/P EST SF 10 MIN: CPT | Performed by: INTERNAL MEDICINE

## 2021-05-18 NOTE — PROGRESS NOTES
Assessment/Plan:      Came in urgently for possible recurring that herpes zoster on the right side of the neck he has a has lesions that extended to the right side of the chest   He was treated with Valtrex I gave in Valisone cream lesions look hyper pigmented switch her to Lidex ointment is symptoms persist Dermatology consultation I told him to use some moisturizing creams also  Has no fever no pain which is good news    No problem-specific Assessment & Plan notes found for this encounter  Diagnoses and all orders for this visit:    Type 2 diabetes mellitus with stage 3 chronic kidney disease, with long-term current use of insulin, unspecified whether stage 3a or 3b CKD ([facility] )    Diabetic ulcer of left midfoot associated with type 2 diabetes mellitus, 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 ([facility])    PAD (peripheral artery disease) ([place] [address] )    Hypertension with renal disease    Malignant neoplasm of urinary bladder, unspecified site ([place] [address] )    Herpes zoster without complication          Subjective:      Patient ID: Francisco Harden is a 64 y o  male  No chief complaint on file          Current Outpatient Medications:     acetaminophen (TYLENOL) 325 mg tablet, Take 650 mg by mouth every 6 (six) hours as needed for mild pain, Disp: , Rfl:     ALPRAZolam (XANAX) 0 25 mg tablet, At twice a day as needed for anxiety, Disp: 30 tablet, Rfl: 0    amoxicillin (AMOXIL) 500 mg capsule, , Disp: , Rfl:     aspirin 81 mg chewable tablet, Chew 1 tablet (81 mg total) daily, Disp: 30 tablet, Rfl: 0    Azelastine HCl 0 15 % SOLN, Inhale 1 spray 2 (two) times a day (Patient taking differently: Inhale 1 spray 2 (two) times a day as needed ), Disp: 30 mL, Rfl: 3    betamethasone valerate (VALISONE) 0 1 % cream, Apply topically 2 (two) times a day, Disp: 30 g, Rfl: 0    Bimatoprost (LUMIGAN OP), Apply to eye, Disp: , Rfl:     bismuth subsalicylate (PEPTO BISMOL) 262 MG chewable tablet, Chew 524 mg daily as needed for indigestion  , Disp: , Rfl:     calcitriol (ROCALTROL) 0 25 mcg capsule, Take 1 capsule (0 25 mcg total) by mouth daily, Disp: 30 capsule, Rfl: 1    docusate sodium (COLACE) 100 mg capsule, Take 1 capsule (100 mg total) by mouth 2 (two) times a day, Disp: 10 capsule, Rfl: 0    DULoxetine (CYMBALTA) 20 mg capsule, Take 1 capsule (20 mg total) by mouth daily, Disp: 30 capsule, Rfl: 1    ezetimibe (ZETIA) 10 mg tablet, 1 tablet, Disp: , Rfl:     Ferrous Sulfate Dried (Feosol) 200 (65 Fe) MG TABS, Take 65 mg by mouth daily, Disp: 30 each, Rfl: 1    fluocinonide (LIDEX) 0 05 % cream, Apply topically 2 (two) times a day, Disp: , Rfl:     fluticasone (FLONASE) 50 mcg/act nasal spray, One spray in each nostril twice a day, Disp: 1 Bottle, Rfl: 3    furosemide (LASIX) 20 mg tablet, TAKE 1 TABLET BY MOUTH AS NEEDED FOR EDEMA, Disp: 90 tablet, Rfl: 1    gabapentin (NEURONTIN) 300 mg capsule, Take 1 capsule (300 mg total) by mouth daily at bedtime, Disp: 90 capsule, Rfl: 3    gabapentin (NEURONTIN) 600 MG tablet, Take 300 mg by mouth 3 (three) times a day, Disp: , Rfl:     Insulin Pen Needle 31G X 8 MM MISC, Inject 3 times a day, Disp: 100 each, Rfl: 2    isosorbide mononitrate (IMDUR) 30 mg 24 hr tablet, Take 3 tablets (90 mg total) by mouth daily, Disp: 90 tablet, Rfl: 0    Lantus SoloStar 100 units/mL injection pen, 15 units qhs (Patient taking differently: 17 units qhs), Disp: 10 pen, Rfl: 1    latanoprost (XALATAN) 0 005 % ophthalmic solution, Administer 1 drop to both eyes daily at bedtime, Disp: , Rfl:     loperamide (IMODIUM) 2 mg capsule, Take 2 mg by mouth 4 (four) times a day as needed for diarrhea, Disp: , Rfl:     metoprolol succinate (TOPROL-XL) 100 mg 24 hr tablet, Take 1 tablet (100 mg total) by mouth daily, Disp: 30 tablet, Rfl: 0    Mirabegron ER (Myrbetriq) 50 MG TB24, Take 1 tablet (50 mg total) by mouth daily, Disp: 30 tablet, Rfl: 1    multivitamin (THERAGRAN) TABS, Take 1 tablet by mouth daily  , Disp: , Rfl:     NovoLOG FlexPen 100 units/mL injection pen, 10 units with each meal , Disp: 5 pen, Rfl: 1    omeprazole (PriLOSEC) 20 mg delayed release capsule, Take 20 mg by mouth daily, Disp: , Rfl:     OXYCODONE HCL ER PO, Take by mouth, Disp: , Rfl:     pantoprazole (PROTONIX) 40 mg tablet, Take 1 tablet (40 mg total) by mouth daily in the early morning, Disp: 30 tablet, Rfl: 0    sertraline (ZOLOFT) 50 mg tablet, Take 1 tablet (50 mg total) by mouth daily, Disp: 30 tablet, Rfl: 3    tamsulosin (FLOMAX) 0 4 mg, Take 1 capsule (0 4 mg total) by mouth daily at bedtime, Disp: 30 capsule, Rfl: 0    valACYclovir (VALTREX) 1,000 mg tablet, Take 1 tablet (1,000 mg total) by mouth 2 (two) times a day for 7 days, Disp: 14 tablet, Rfl: 0    HPI    The following portions of the patient's history were reviewed and updated as appropriate: allergies, current medications, past family history, past medical history, past social history, past surgical history and problem list     Review of Systems   Constitutional: Negative  Negative for activity change, appetite change, fatigue, fever and unexpected weight change  HENT: Negative for congestion, ear pain, hearing loss, mouth sores, postnasal drip, rhinorrhea, sore throat, trouble swallowing and voice change  Eyes: Negative for pain, redness and visual disturbance  Respiratory: Negative for cough, chest tightness, shortness of breath and wheezing  Cardiovascular: Negative for chest pain, palpitations and leg swelling  Gastrointestinal: Negative for abdominal distention, abdominal pain, blood in stool, constipation, diarrhea and nausea  Endocrine: Negative for cold intolerance, heat intolerance, polydipsia, polyphagia and polyuria  Genitourinary: Negative for difficulty urinating, dysuria, flank pain, frequency, hematuria and urgency  Musculoskeletal: Negative for arthralgias, back pain, gait problem, joint swelling and myalgias     Skin: Positive for rash  Negative for color change and pallor  Neurological: Negative for dizziness, tremors, seizures, syncope, weakness, numbness and headaches  Hematological: Negative for adenopathy  Does not bruise/bleed easily  Psychiatric/Behavioral: Negative  Negative for sleep disturbance  The patient is not nervous/anxious  Objective: There were no vitals taken for this visit  Physical Exam  Skin:     General: Skin is warm and dry  Findings: Lesion and rash present        Comments: Vesicular lesions in the neck and upper chest area on the right has healing from herpes zoster

## 2021-05-18 NOTE — PATIENT INSTRUCTIONS
Order Lidex ointment to apply twice a day to the lesions  There is no active lesions  Residual pigmentation  Dermatology consultation if needed  May also use moisturizer creams cintia

## 2021-06-07 ENCOUNTER — HOSPITAL ENCOUNTER (OUTPATIENT)
Dept: NON INVASIVE DIAGNOSTICS | Facility: CLINIC | Age: 76
Discharge: HOME | End: 2021-06-07
Payer: MEDICARE

## 2021-06-07 DIAGNOSIS — I73.9 PAD (PERIPHERAL ARTERY DISEASE) (HCC): ICD-10-CM

## 2021-06-07 PROCEDURE — 93926 LOWER EXTREMITY STUDY: CPT | Performed by: SURGERY

## 2021-06-07 PROCEDURE — 93922 UPR/L XTREMITY ART 2 LEVELS: CPT | Performed by: SURGERY

## 2021-06-07 PROCEDURE — 93926 LOWER EXTREMITY STUDY: CPT

## 2021-06-11 DIAGNOSIS — R09.89 LEFT CAROTID BRUIT: ICD-10-CM

## 2021-06-11 RX ORDER — METOPROLOL SUCCINATE 100 MG/1
100 TABLET, EXTENDED RELEASE ORAL DAILY
Qty: 30 TABLET | Refills: 0 | Status: SHIPPED | OUTPATIENT
Start: 2021-06-11 | End: 2021-07-05

## 2021-06-22 ENCOUNTER — APPOINTMENT (OUTPATIENT)
Dept: LAB | Facility: CLINIC | Age: 76
End: 2021-06-22
Payer: MEDICARE

## 2021-06-22 DIAGNOSIS — Z00.00 HEALTHCARE MAINTENANCE: ICD-10-CM

## 2021-06-22 DIAGNOSIS — Z89.422 LEFT TOE AMPUTEE (HCC): ICD-10-CM

## 2021-06-22 DIAGNOSIS — E11.621 DIABETIC ULCER OF LEFT MIDFOOT ASSOCIATED WITH TYPE 2 DIABETES MELLITUS, WITH BONE INVOLVEMENT WITHOUT EVIDENCE OF NECROSIS (HCC): ICD-10-CM

## 2021-06-22 DIAGNOSIS — E78.2 MIXED HYPERLIPIDEMIA: ICD-10-CM

## 2021-06-22 DIAGNOSIS — L97.426 DIABETIC ULCER OF LEFT MIDFOOT ASSOCIATED WITH TYPE 2 DIABETES MELLITUS, WITH BONE INVOLVEMENT WITHOUT EVIDENCE OF NECROSIS (HCC): ICD-10-CM

## 2021-06-22 DIAGNOSIS — E11.22 TYPE 2 DIABETES MELLITUS WITH STAGE 3 CHRONIC KIDNEY DISEASE, WITH LONG-TERM CURRENT USE OF INSULIN, UNSPECIFIED WHETHER STAGE 3A OR 3B CKD (HCC): ICD-10-CM

## 2021-06-22 DIAGNOSIS — E11.8 TYPE 2 DIABETES MELLITUS WITH COMPLICATION, WITH LONG-TERM CURRENT USE OF INSULIN (HCC): ICD-10-CM

## 2021-06-22 DIAGNOSIS — I25.10 CORONARY ARTERY DISEASE INVOLVING NATIVE CORONARY ARTERY OF NATIVE HEART WITHOUT ANGINA PECTORIS: ICD-10-CM

## 2021-06-22 DIAGNOSIS — Z79.4 TYPE 2 DIABETES MELLITUS WITH COMPLICATION, WITH LONG-TERM CURRENT USE OF INSULIN (HCC): ICD-10-CM

## 2021-06-22 DIAGNOSIS — I12.9 HYPERTENSION WITH RENAL DISEASE: ICD-10-CM

## 2021-06-22 DIAGNOSIS — R55 SYNCOPE, UNSPECIFIED SYNCOPE TYPE: ICD-10-CM

## 2021-06-22 DIAGNOSIS — N25.81 SECONDARY RENAL HYPERPARATHYROIDISM (HCC): ICD-10-CM

## 2021-06-22 DIAGNOSIS — N18.30 TYPE 2 DIABETES MELLITUS WITH STAGE 3 CHRONIC KIDNEY DISEASE, WITH LONG-TERM CURRENT USE OF INSULIN, UNSPECIFIED WHETHER STAGE 3A OR 3B CKD (HCC): ICD-10-CM

## 2021-06-22 DIAGNOSIS — Z79.4 TYPE 2 DIABETES MELLITUS WITH STAGE 3 CHRONIC KIDNEY DISEASE, WITH LONG-TERM CURRENT USE OF INSULIN, UNSPECIFIED WHETHER STAGE 3A OR 3B CKD (HCC): ICD-10-CM

## 2021-06-22 DIAGNOSIS — N18.4 CKD (CHRONIC KIDNEY DISEASE) STAGE 4, GFR 15-29 ML/MIN (HCC): ICD-10-CM

## 2021-06-22 DIAGNOSIS — I73.9 PAD (PERIPHERAL ARTERY DISEASE) (HCC): ICD-10-CM

## 2021-06-22 DIAGNOSIS — C67.9 MALIGNANT NEOPLASM OF URINARY BLADDER, UNSPECIFIED SITE (HCC): ICD-10-CM

## 2021-06-22 DIAGNOSIS — I73.9 PVD (PERIPHERAL VASCULAR DISEASE) (HCC): ICD-10-CM

## 2021-06-22 LAB
25(OH)D3 SERPL-MCNC: 36.1 NG/ML (ref 30–100)
ALBUMIN SERPL BCP-MCNC: 3.7 G/DL (ref 3.5–5)
ALP SERPL-CCNC: 96 U/L (ref 46–116)
ALT SERPL W P-5'-P-CCNC: 74 U/L (ref 12–78)
ANION GAP SERPL CALCULATED.3IONS-SCNC: 6 MMOL/L (ref 4–13)
AST SERPL W P-5'-P-CCNC: 51 U/L (ref 5–45)
BACTERIA UR QL AUTO: ABNORMAL /HPF
BASOPHILS # BLD AUTO: 0.05 THOUSANDS/ΜL (ref 0–0.1)
BASOPHILS NFR BLD AUTO: 1 % (ref 0–1)
BILIRUB SERPL-MCNC: 0.38 MG/DL (ref 0.2–1)
BILIRUB UR QL STRIP: NEGATIVE
BUN SERPL-MCNC: 38 MG/DL (ref 5–25)
CALCIUM SERPL-MCNC: 8.8 MG/DL (ref 8.3–10.1)
CHLORIDE SERPL-SCNC: 111 MMOL/L (ref 100–108)
CHOLEST SERPL-MCNC: 152 MG/DL (ref 50–200)
CLARITY UR: CLEAR
CO2 SERPL-SCNC: 24 MMOL/L (ref 21–32)
COLOR UR: YELLOW
CREAT SERPL-MCNC: 2.57 MG/DL (ref 0.6–1.3)
CREAT UR-MCNC: 72 MG/DL
EOSINOPHIL # BLD AUTO: 0.18 THOUSAND/ΜL (ref 0–0.61)
EOSINOPHIL NFR BLD AUTO: 3 % (ref 0–6)
ERYTHROCYTE [DISTWIDTH] IN BLOOD BY AUTOMATED COUNT: 14.6 % (ref 11.6–15.1)
EST. AVERAGE GLUCOSE BLD GHB EST-MCNC: 186 MG/DL
FERRITIN SERPL-MCNC: 31 NG/ML (ref 8–388)
GFR SERPL CREATININE-BSD FRML MDRD: 27 ML/MIN/1.73SQ M
GLUCOSE P FAST SERPL-MCNC: 100 MG/DL (ref 65–99)
GLUCOSE UR STRIP-MCNC: ABNORMAL MG/DL
HBA1C MFR BLD: 8.1 %
HCT VFR BLD AUTO: 35.4 % (ref 36.5–49.3)
HCV AB SER QL: NORMAL
HDLC SERPL-MCNC: 44 MG/DL
HGB BLD-MCNC: 11.6 G/DL (ref 12–17)
HGB UR QL STRIP.AUTO: ABNORMAL
HYALINE CASTS #/AREA URNS LPF: ABNORMAL /LPF
IMM GRANULOCYTES # BLD AUTO: 0.01 THOUSAND/UL (ref 0–0.2)
IMM GRANULOCYTES NFR BLD AUTO: 0 % (ref 0–2)
KETONES UR STRIP-MCNC: NEGATIVE MG/DL
LDLC SERPL CALC-MCNC: 88 MG/DL (ref 0–100)
LEUKOCYTE ESTERASE UR QL STRIP: ABNORMAL
LYMPHOCYTES # BLD AUTO: 2.29 THOUSANDS/ΜL (ref 0.6–4.47)
LYMPHOCYTES NFR BLD AUTO: 34 % (ref 14–44)
MAGNESIUM SERPL-MCNC: 2.2 MG/DL (ref 1.6–2.6)
MCH RBC QN AUTO: 30.1 PG (ref 26.8–34.3)
MCHC RBC AUTO-ENTMCNC: 32.8 G/DL (ref 31.4–37.4)
MCV RBC AUTO: 92 FL (ref 82–98)
MICROALBUMIN UR-MCNC: 327 MG/L (ref 0–20)
MICROALBUMIN/CREAT 24H UR: 454 MG/G CREATININE (ref 0–30)
MONOCYTES # BLD AUTO: 0.55 THOUSAND/ΜL (ref 0.17–1.22)
MONOCYTES NFR BLD AUTO: 8 % (ref 4–12)
NEUTROPHILS # BLD AUTO: 3.64 THOUSANDS/ΜL (ref 1.85–7.62)
NEUTS SEG NFR BLD AUTO: 54 % (ref 43–75)
NITRITE UR QL STRIP: NEGATIVE
NON-SQ EPI CELLS URNS QL MICRO: ABNORMAL /HPF
NONHDLC SERPL-MCNC: 108 MG/DL
NRBC BLD AUTO-RTO: 0 /100 WBCS
PH UR STRIP.AUTO: 6 [PH]
PLATELET # BLD AUTO: 157 THOUSANDS/UL (ref 149–390)
PMV BLD AUTO: 11.6 FL (ref 8.9–12.7)
POTASSIUM SERPL-SCNC: 4.4 MMOL/L (ref 3.5–5.3)
PROT SERPL-MCNC: 7.4 G/DL (ref 6.4–8.2)
PROT UR STRIP-MCNC: ABNORMAL MG/DL
RBC # BLD AUTO: 3.85 MILLION/UL (ref 3.88–5.62)
RBC #/AREA URNS AUTO: ABNORMAL /HPF
SODIUM SERPL-SCNC: 141 MMOL/L (ref 136–145)
SP GR UR STRIP.AUTO: 1.01 (ref 1–1.03)
TRIGL SERPL-MCNC: 100 MG/DL
TSH SERPL DL<=0.05 MIU/L-ACNC: 2.03 UIU/ML (ref 0.36–3.74)
UROBILINOGEN UR QL STRIP.AUTO: 0.2 E.U./DL
WBC # BLD AUTO: 6.72 THOUSAND/UL (ref 4.31–10.16)
WBC #/AREA URNS AUTO: ABNORMAL /HPF

## 2021-06-22 PROCEDURE — 80053 COMPREHEN METABOLIC PANEL: CPT

## 2021-06-22 PROCEDURE — 83735 ASSAY OF MAGNESIUM: CPT

## 2021-06-22 PROCEDURE — 82570 ASSAY OF URINE CREATININE: CPT | Performed by: INTERNAL MEDICINE

## 2021-06-22 PROCEDURE — 36415 COLL VENOUS BLD VENIPUNCTURE: CPT

## 2021-06-22 PROCEDURE — 80061 LIPID PANEL: CPT

## 2021-06-22 PROCEDURE — 83036 HEMOGLOBIN GLYCOSYLATED A1C: CPT

## 2021-06-22 PROCEDURE — 85025 COMPLETE CBC W/AUTO DIFF WBC: CPT

## 2021-06-22 PROCEDURE — 86803 HEPATITIS C AB TEST: CPT

## 2021-06-22 PROCEDURE — 81001 URINALYSIS AUTO W/SCOPE: CPT | Performed by: INTERNAL MEDICINE

## 2021-06-22 PROCEDURE — 84443 ASSAY THYROID STIM HORMONE: CPT

## 2021-06-22 PROCEDURE — 82728 ASSAY OF FERRITIN: CPT

## 2021-06-22 PROCEDURE — 82306 VITAMIN D 25 HYDROXY: CPT

## 2021-06-22 PROCEDURE — 82043 UR ALBUMIN QUANTITATIVE: CPT | Performed by: INTERNAL MEDICINE

## 2021-06-23 ENCOUNTER — TELEPHONE (OUTPATIENT)
Dept: INTERNAL MEDICINE CLINIC | Facility: CLINIC | Age: 76
End: 2021-06-23

## 2021-06-23 NOTE — TELEPHONE ENCOUNTER
Spoke to Kristofer, gave result  He said he has no symptoms of a UTI  Result was sent to Dr Clemons Flash

## 2021-06-23 NOTE — TELEPHONE ENCOUNTER
----- Message from Ivan Caro MD sent at 6/22/2021  4:46 PM EDT -----  Please call the patient regarding his abnormal result  Urinalysis shows inflammation    I know he has a history of bladder cancer send the report to Urology let us know if he has symptoms of UTI and then will treat him

## 2021-06-23 NOTE — TELEPHONE ENCOUNTER
----- Message from Shmuel Johnson MD sent at 6/22/2021  4:47 PM EDT -----  Please call the patient regarding his abnormal result   Renal function deteriorated a little bit send report to Nephrology as far as the urinary tract and urinalysis from the previous task if he has symptoms he can start him on Macrodantin 50 mg twice a day for 5 days

## 2021-06-24 ENCOUNTER — OFFICE VISIT (OUTPATIENT)
Dept: ENDOCRINOLOGY | Facility: HOSPITAL | Age: 76
End: 2021-06-24
Payer: MEDICARE

## 2021-06-24 VITALS
HEART RATE: 109 BPM | SYSTOLIC BLOOD PRESSURE: 110 MMHG | WEIGHT: 241.4 LBS | DIASTOLIC BLOOD PRESSURE: 80 MMHG | HEIGHT: 75 IN | BODY MASS INDEX: 30.02 KG/M2

## 2021-06-24 DIAGNOSIS — E11.8 TYPE 2 DIABETES MELLITUS WITH COMPLICATION, WITH LONG-TERM CURRENT USE OF INSULIN (HCC): Primary | ICD-10-CM

## 2021-06-24 DIAGNOSIS — Z79.4 TYPE 2 DIABETES MELLITUS WITH COMPLICATION, WITH LONG-TERM CURRENT USE OF INSULIN (HCC): Primary | ICD-10-CM

## 2021-06-24 PROCEDURE — 99214 OFFICE O/P EST MOD 30 MIN: CPT | Performed by: PHYSICIAN ASSISTANT

## 2021-06-24 RX ORDER — INSULIN GLARGINE 100 [IU]/ML
INJECTION, SOLUTION SUBCUTANEOUS
Qty: 30 ML | Refills: 1 | Status: SHIPPED | OUTPATIENT
Start: 2021-06-24

## 2021-06-24 NOTE — PROGRESS NOTES
Hetal Mosqueda 64 y o  male MRN: [id number]    Encounter: [id number]      Assessment/Plan     Assessment: This is a 64y o -year-old male with type 2 diabetes with hypertension and hyperlipidemia  Plan:  1  Type 2 diabetes:  Most recent hemoglobin A1c was 8 1  This has increased since last visit  Has not been using Dexcom due to causing an infection on his abdomen  Increase to Lantus 18 units at night and NovoLog 15 units with each meal with sliding scale  He is a type 2 diabetic currently utilizing for insulin injections a day with a sliding scale  He was previously using a Dexcom, but is now checking blood sugar 4 times a day  Because of these factors, I think he would be beneficial for him to utilize a freestyle Sara to monitor his blood sugars  Send in blood sugar logs in 2 weeks  Please contact the office if there is any concerns with consistent hypoglycemia  Follow-up in 3 months with lab work completed prior to visit      2  Hypertension:  Blood pressure no Intensive in the office today  Slight decline in kidney function  Is currently following up with his PCP for an abnormal urinalysis  Continue with current blood pressure medications at this time  Repeat CMP prior to next office visit      3  Hyperlipidemia:  Reviewed recent lipid panel  Cholesterol levels are excellent  Continue with Zetia at this time  Will repeat lab work prior to next office visit  CC: Type 2 diabetes follow-up    History of Present Illness     HPI:  Kaur Mora Q 89 y  o  year old male with type 2 diabetes for about 25 years   He is on insulin at home and takes Novolog 15 units with each meal,  Lantus 17 units q h s  He denies any polyuria, polydipsia, nocturia and blurry vision   Diabetes is complicated by history of peripheral neuropathy and he also has history of foot infection back in 2016   He has a history of CKD and follows closely with Nephrology      Since last visit had amputation of 5th digit of left foot due to osteomyelitis  Has finished following up with Podiatry  States that when healed nicely, and has not had any complications at this time      Hypoglycemic episodes:   No hypoglycemic episodes recently       The patient's last eye exam was in June of 2020   The patient's last foot exam was in October 2020      Blood Sugar/Glucometer/Pump/CGM review:  is currently checking blood sugar 4 times a day, but did not bring in blood sugar logs into the office  Is no longer utilizing the Dexcom      In the past for treatment of hyperlipidemia has been intolerant to multiple statins  Review of Systems   Constitutional: Negative for activity change, appetite change, fatigue and unexpected weight change  HENT: Negative for trouble swallowing  Eyes: Negative for visual disturbance  Respiratory: Negative for chest tightness and shortness of breath  Cardiovascular: Negative for chest pain, palpitations and leg swelling  Gastrointestinal: Negative for abdominal pain, diarrhea, nausea and vomiting  Endocrine: Negative for cold intolerance, heat intolerance, polydipsia, polyphagia and polyuria  Genitourinary: Negative for frequency  Musculoskeletal: Positive for back pain and gait problem ( utilizes cane)  Skin: Negative for rash and wound  Neurological: Positive for numbness (  Bilateral arms)  Negative for dizziness, syncope, weakness, light-headedness and headaches  Psychiatric/Behavioral: Negative for dysphoric mood and sleep disturbance  The patient is not nervous/anxious          Historical Information   Past Medical History:   Diagnosis Date    Anemia     Last assessed: 9/28/17    Anxiety     Arteriosclerotic cardiovascular disease     Last assessed: 9/28/17    Arthritis     [facility]     bladder- had BCG treatments    Chronic kidney disease     CKD (chronic kidney disease) stage 4, GFR 15-29 ml/min ([facility])     Colon polyp     Coronary artery disease     7 stents    Depression     Diabetes mellitus ([facility] )     IDDM    GERD (gastroesophageal reflux disease)     Glaucoma     History of fusion of cervical spine     Hyperlipidemia     Hypertension     Insomnia     Last assessed: 11/14/12    Loss of hearing     has hearing aids but usually does not wear them    Other seasonal allergic rhinitis     Last assessed: 2/10/16    [facility]peripheral [facility]     Shortness of breath     on exertion    Spinal stenosis of lumbar region     Transient cerebral ischemia     Uses walker     w/c for longer distances     Past Surgical History:   Procedure Laterality Date    CARDIAC SURGERY      Cath stent placement  Last assessed: 3/9/17  Interventional Catheterization    CHOLECYSTECTOMY      COLONOSCOPY      CYSTOSCOPY      Diagnostic w/biopsy  Lennette Tanna  Last assessed: 12/1/14    CYSTOURETHROSCOPY      w/cautery  Lennette Tanna    GASTRIC BYPASS      For morbid obesity w/Shaji-en-Y   Resolved: 11/17/09    INCISION AND DRAINAGE OF WOUND Right 2/26/2017    Procedure: INCISION AND DRAINAGE (I&D) EXTREMITY WITH APPLICATION OF GRAFT JACKET;  Surgeon: Dayanna Forrest DPM;  [facility];  Service:     INCISION AND DRAINAGE OF WOUND Right 4/25/2017    Procedure: INCISION AND DRAINAGE (I&D) EXTREMITY, APPLICATION OF GRAFT;  Surgeon: Dayanna Forrest DPM;  [facility];  Service:     IR BIOPSY OTHER  7/2/2020    IR LOWER EXTREMITY ANGIOGRAM  2/8/2021    IR LOWER EXTREMITY ANGIOGRAM  2/11/2021    IR TUNNELED CENTRAL LINE PLACEMENT  12/24/2020    JOINT REPLACEMENT      christofer knees replaced    WA AMPUTATION METATARSAL+TOE,SINGLE Left 12/21/2020    Procedure: RAY RESECTION FOOT;  Surgeon: Jaquelin Chavez DPM;  [facility];  Service: Podiatry    WA AMPUTATION METATARSAL+TOE,SINGLE Left 12/31/2020    Procedure: 5TH MET RESECTION;  Surgeon: Jaquelin Chavez DPM;  [facility];  Service: Podiatry    WA CYSTOURETHROSCOPY W/IRRIG & EVAC CLOTS N/A 2/10/2021    Procedure: CYSTOSCOPY EVACUATION OF CLOTS, fulguration;  Surgeon: Aydin Obando MD;  [facility];  Service: Urology    MT CYSTOURETHROSCOPY,BIOPSY N/A 2016    Procedure: Neo Graves;  Surgeon: Jake Hernandez MD;  [facility];  Service: Urology    MT CYSTOURETHROSCOPY,FULGUR <0 5 CM LESN N/A 2020    Procedure: CYSTO W/BIOPSIES, transurethral prostate bx;  Surgeon: José Miguel Hawkins MD;  [facility];  Service: Urology    MT Karina Mercury 3RD+ ORD SLCTV ABDL PEL/LXTR [facility] Left 2021    Procedure: LEG angiogram, CO2 w/limited contrast with balloon angioplasty postertior tibial artery;  Surgeon: Valentino Slates, MD;  [facility];  Service: Vascular    ROTATOR CUFF REPAIR      SMALL INTESTINE SURGERY      Surgery Shaji-en-Y    SPINAL FUSION      lumbar and cervical fusions    VAC DRESSING APPLICATION Right 3/19/4993    Procedure: APPLICATION VAC DRESSING;  Surgeon: Alejandra Vail DPM;  [facility];  Service:    [address] [place] [address] Left 2021    Procedure: FOOT DEBRIDE, [address] OUT w/graft application;  Surgeon: Alejandra Vail DPM;  [facility];  Service: Podiatry     Social History   Social History     Substance and Sexual Activity   Alcohol Use Not Currently    Comment: beer / liquor     Social History     Substance and Sexual Activity   Drug Use Not Currently    Types: Marijuana    Comment: quit 2019 had medical marijuana     Social History     Tobacco Use   Smoking Status Former Smoker    Quit date: 1970    Years since quittin 5   Smokeless Tobacco Never Used     Family History:   Family History   Problem Relation Age of Onset    Diabetes Mother     Heart disease Mother     Other Mother         High blood pressure    Heart disease Father     Diabetes Sister     Other Sister         High blood pressure    Kidney disease Sister     Heart disease Brother     Other Brother         High blood pressure       Meds/Allergies   Current Outpatient Medications   Medication Sig Dispense Refill    acetaminophen (TYLENOL) 325 mg tablet Take 650 mg by mouth every 6 (six) hours as needed for mild pain      amoxicillin (AMOXIL) 500 mg capsule       aspirin 81 mg chewable tablet Chew 1 tablet (81 mg total) daily 30 tablet 0    Azelastine HCl 0 15 % SOLN Inhale 1 spray 2 (two) times a day (Patient taking differently: Inhale 1 spray 2 (two) times a day as needed ) 30 mL 3    bismuth subsalicylate (PEPTO BISMOL) 262 MG chewable tablet Chew 524 mg daily as needed for indigestion        calcitriol (ROCALTROL) 0 25 mcg capsule Take 1 capsule (0 25 mcg total) by mouth daily 30 capsule 1    docusate sodium (COLACE) 100 mg capsule Take 1 capsule (100 mg total) by mouth 2 (two) times a day 10 capsule 0    DULoxetine (CYMBALTA) 20 mg capsule Take 1 capsule (20 mg total) by mouth daily 30 capsule 1    ezetimibe (ZETIA) 10 mg tablet 1 tablet      Ferrous Sulfate Dried (Feosol) 200 (65 Fe) MG TABS Take 65 mg by mouth daily 30 each 1    fluocinonide (LIDEX) 0 05 % cream Apply topically 2 (two) times a day 30 g 0    fluticasone (FLONASE) 50 mcg/act nasal spray One spray in each nostril twice a day 1 Bottle 3    furosemide (LASIX) 20 mg tablet TAKE 1 TABLET BY MOUTH AS NEEDED FOR EDEMA 90 tablet 1    gabapentin (NEURONTIN) 300 mg capsule Take 1 capsule (300 mg total) by mouth daily at bedtime 90 capsule 3    gabapentin (NEURONTIN) 600 MG tablet Take 300 mg by mouth 3 (three) times a day      Insulin Pen Needle 31G X 8 MM MISC Inject 3 times a day 100 each 2    isosorbide mononitrate (IMDUR) 30 mg 24 hr tablet Take 3 tablets (90 mg total) by mouth daily (Patient taking differently: Take 30 mg by mouth daily ) 90 tablet 0    Lantus SoloStar 100 units/mL injection pen 15 units qhs (Patient taking differently: 17 units qhs) 10 pen 1    latanoprost (XALATAN) 0 005 % ophthalmic solution Administer 1 drop to both eyes daily at bedtime      loperamide (IMODIUM) 2 mg capsule Take 2 mg by mouth 4 (four) times a day as needed for diarrhea      metoprolol succinate (TOPROL-XL) 100 mg 24 hr tablet Take 1 tablet (100 mg total) by mouth daily 30 tablet 0    Mirabegron ER (Myrbetriq) 50 MG TB24 Take 1 tablet (50 mg total) by mouth daily 30 tablet 1    multivitamin (THERAGRAN) TABS Take 1 tablet by mouth daily   NovoLOG FlexPen 100 units/mL injection pen 10 units with each meal  5 pen 1    omeprazole (PriLOSEC) 20 mg delayed release capsule Take 20 mg by mouth daily      OXYCODONE HCL ER PO Take by mouth      pantoprazole (PROTONIX) 40 mg tablet Take 1 tablet (40 mg total) by mouth daily in the early morning 30 tablet 0    sertraline (ZOLOFT) 50 mg tablet Take 1 tablet (50 mg total) by mouth daily 30 tablet 3    tamsulosin (FLOMAX) 0 4 mg Take 1 capsule (0 4 mg total) by mouth daily at bedtime 30 capsule 0    valACYclovir (VALTREX) 1,000 mg tablet Take 1 tablet (1,000 mg total) by mouth 2 (two) times a day for 7 days 14 tablet 0    ALPRAZolam (XANAX) 0 25 mg tablet At twice a day as needed for anxiety (Patient not taking: Reported on 6/24/2021) 30 tablet 0    Bimatoprost (LUMIGAN OP) Apply to eye (Patient not taking: Reported on 6/24/2021)       No current facility-administered medications for this visit  Allergies   Allergen Reactions    Atorvastatin Hives, Itching and Rash    Simvastatin Rash and Edema     "ALL STATINS"    Statins Itching    Insulin Lispro Swelling and Edema    Medical Tape      rash to electrodes    Other Itching, Rash and Other (See Comments)     rash to electrodes and adhesives       Objective   Vitals: Blood pressure 110/80, pulse (!) 109, height 6' 2 5" (1 892 m), weight 109 kg (241 lb 6 4 oz)  Physical Exam  Vitals and nursing note reviewed  Constitutional:       General: He is not in acute distress  Appearance: Normal appearance  He is not diaphoretic     HENT:      Head: Normocephalic and atraumatic  Eyes:      General: No scleral icterus  Extraocular Movements: Extraocular movements intact  Conjunctiva/sclera: Conjunctivae normal       Pupils: Pupils are equal, round, and reactive to light  Cardiovascular:      Rate and Rhythm: Normal rate and regular rhythm  Heart sounds: No murmur heard  Pulmonary:      Effort: Pulmonary effort is normal  No respiratory distress  Breath sounds: Normal breath sounds  No wheezing  Musculoskeletal:      Cervical back: Normal range of motion  Right lower leg: No edema  Left lower leg: No edema  Lymphadenopathy:      Cervical: No cervical adenopathy  Skin:     General: Skin is warm and dry  Neurological:      Mental Status: He is alert and oriented to person, place, and time  Mental status is at baseline  Sensory: No sensory deficit  Gait: Gait abnormal    Psychiatric:         Mood and Affect: Mood normal          Behavior: Behavior normal          Thought Content: Thought content normal          The history was obtained from the review of the chart, patient      Lab Results:   Lab Results   Component Value Date/Time    Hemoglobin A1C 8 1 (H) 06/22/2021 07:29 AM    Hemoglobin A1C 7 0 (H) 02/04/2021 01:04 PM    Hemoglobin A1C 7 7 (H) 01/11/2021 03:40 AM    Hemoglobin A1C 7 4 (H) 12/18/2020 04:52 PM    WBC 6 72 06/22/2021 07:29 AM    WBC 7 56 02/17/2021 05:38 AM    WBC 7 97 02/16/2021 05:27 AM    Hemoglobin 11 6 (L) 06/22/2021 07:29 AM    Hemoglobin 9 2 (L) 02/17/2021 05:38 AM    Hemoglobin 9 1 (L) 02/16/2021 05:27 AM    Hematocrit 35 4 (L) 06/22/2021 07:29 AM    Hematocrit 28 5 (L) 02/17/2021 05:38 AM    Hematocrit 28 0 (L) 02/16/2021 05:27 AM    MCV 92 06/22/2021 07:29 AM    MCV 94 02/17/2021 05:38 AM    MCV 94 02/16/2021 05:27 AM    Platelets 123 12/71/5923 07:29 AM    Platelets 000 97/94/3567 05:38 AM    Platelets 089 62/85/2670 05:27 AM    BUN 38 (H) 06/22/2021 07:29 AM    BUN 33 (H) 02/17/2021 05:38 AM    BUN 34 (H) 02/16/2021 05:27 AM    Potassium 4 4 06/22/2021 07:29 AM    Potassium 4 7 02/17/2021 05:38 AM    Potassium 4 2 02/16/2021 05:27 AM    Chloride 111 (H) 06/22/2021 07:29 AM    Chloride 104 02/17/2021 05:38 AM    Chloride 104 02/16/2021 05:27 AM    CO2 24 06/22/2021 07:29 AM    CO2 26 02/17/2021 05:38 AM    CO2 26 02/16/2021 05:27 AM    Creatinine 2 57 (H) 06/22/2021 07:29 AM    Creatinine 2 08 (H) 02/17/2021 05:38 AM    Creatinine 2 06 (H) 02/16/2021 05:27 AM    AST 51 (H) 06/22/2021 07:29 AM    AST 24 02/04/2021 01:04 PM    AST 25 12/28/2020 05:07 AM    ALT 74 06/22/2021 07:29 AM    ALT 47 02/04/2021 01:04 PM    ALT 29 12/28/2020 05:07 AM    Albumin 3 7 06/22/2021 07:29 AM    Albumin 3 6 02/04/2021 01:04 PM    Albumin 2 7 (L) 12/28/2020 05:07 AM    HDL, Direct 44 06/22/2021 07:29 AM    HDL, Direct 35 (L) 02/04/2021 01:04 PM    HDL, Direct 43 10/16/2020 12:53 PM    Triglycerides 100 06/22/2021 07:29 AM    Triglycerides 121 02/04/2021 01:04 PM    Triglycerides 157 (H) 10/16/2020 12:53 PM       Portions of the record may have been created with voice recognition software  Occasional wrong word or "sound a like" substitutions may have occurred due to the inherent limitations of voice recognition software  Read the chart carefully and recognize, using context, where substitutions have occurred

## 2021-06-24 NOTE — TELEPHONE ENCOUNTER
Spoke with Albina Sharpe, gave results  He has no symptoms of a UTI  Lab work faxed to Dr Jessica Shaw (nephrology)

## 2021-06-24 NOTE — PATIENT INSTRUCTIONS
Continue monitor diet, maintain physical activity      Increase Lantus 18 units at night  Continue with NovoLog 15 units with each meal        Will work on orville alvarez        Call the office if any concerns for low blood sugar  Send in blood sugar logs in 2 weeks      Continue with Zetia for cholesterol      Follow-up in 3 months with lab work completed prior to visit

## 2021-06-25 ENCOUNTER — TELEPHONE (OUTPATIENT)
Dept: ADMINISTRATIVE | Facility: OTHER | Age: 76
End: 2021-06-25

## 2021-06-25 NOTE — LETTER
Diabetic Eye Exam Form    Date Requested: 21  Patient: Jerald Byrne  Patient : 1945   Referring Provider: George Taylor MD      Dilated Retinal Exam, Optomap-Iris Exam, or Fundus Photography Done           Yes ([place] one above)         No     Date of Diabetic Eye Exam ______________________________    Left Eye      Exam did show retinopathy    Exam did not show retinopathy         Mild       Moderate       None       Proliferative       Severe     Right Eye     Exam did show retinopathy    Exam did not show retinopathy         Mild       Moderate       None       Proliferative       Severe     Comments __________________________________________________________    Practice Providing Exam ______________________________________________    Exam Performed By (print name) _______________________________________      Provider Signature ___________________________________________________      These reports are needed for  compliance    Please fax this completed form and a copy of the Diabetic Eye Exam report to our office located at [place] [address] as soon as possible to [phone] miky Choudhury: Phone [phone]    We thank you for your assistance in treating our mutual patient

## 2021-06-25 NOTE — LETTER
Diabetic Eye Exam Form    Date Requested: 21  Patient: Willette Sandhoff  Patient : 1945   Referring Provider: Karen Antony MD      Dilated Retinal Exam, Optomap-Iris Exam, or Fundus Photography Done           Yes ([place] one above)         No     Date of Diabetic Eye Exam ______________________________    Left Eye      Exam did show retinopathy    Exam did not show retinopathy         Mild       Moderate       None       Proliferative       Severe     Right Eye     Exam did show retinopathy    Exam did not show retinopathy         Mild       Moderate       None       Proliferative       Severe     Comments __________________________________________________________    Practice Providing Exam ______________________________________________    Exam Performed By (print name) _______________________________________      Provider Signature ___________________________________________________      These reports are needed for  compliance    Please fax this completed form and a copy of the Diabetic Eye Exam report to our office located at [place] [address] as soon as possible to [phone] miky Boyer: Phone [phone]    We thank you for your assistance in treating our mutual patient

## 2021-06-25 NOTE — TELEPHONE ENCOUNTER
----- Message from [address] [facility] sent at 6/24/2021 11:20 AM EDT -----  Regarding: DM EYE EXAM  06/24/21 11:21 AM    Sherman, our patient Janice Steinberg see's [facility] for his DM Eye Exam  Their number is [phone]  He also see's a physician at the [facility]  Their number is [phone]       Thank you,  [address] [facility]

## 2021-06-25 NOTE — TELEPHONE ENCOUNTER
Upon review of the In Basket request and the patient's chart, initial outreach has been made via fax, please see Contacts section for details       Thank you  Rachid Basurto

## 2021-06-25 NOTE — LETTER
Diabetic Eye Exam Form    Date Requested: 21  Patient: Andrea Krause  Patient : 1945   Referring Provider: Evens Virgen MD      Dilated Retinal Exam, Optomap-Iris Exam, or Fundus Photography Done           Yes ([place] one above)         No     Date of Diabetic Eye Exam ______________________________    Left Eye      Exam did show retinopathy    Exam did not show retinopathy         Mild       Moderate       None       Proliferative       Severe     Right Eye     Exam did show retinopathy    Exam did not show retinopathy         Mild       Moderate       None       Proliferative       Severe     Comments __________________________________________________________    Practice Providing Exam ______________________________________________    Exam Performed By (print name) _______________________________________      Provider Signature ___________________________________________________      These reports are needed for  compliance    Please fax this completed form and a copy of the Diabetic Eye Exam report to our office located at [place] [address] as soon as possible to [phone] miky Alfred: Phone [phone]    We thank you for your assistance in treating our mutual patient

## 2021-06-28 ENCOUNTER — TELEPHONE (OUTPATIENT)
Dept: UROLOGY | Facility: MEDICAL CENTER | Age: 76
End: 2021-06-28

## 2021-06-28 NOTE — TELEPHONE ENCOUNTER
Call placed to patient and spoke with him  Informed him of his recent urine testing and that no treatment is needed at this time  Pti s feeling well and was thankful for the call  Pt did inform me that he missed his scheduled appointment last week with Dr Collin Urban and would like to reschedule  He wants to talk to the doctor to see if his "cancer came back"  Informed patient that I will reach out to Dr Collin Urban to see when we can schedule patient and the office will call him back with an appointment

## 2021-06-28 NOTE — TELEPHONE ENCOUNTER
Patient's UA does not appear overtly infected  If patient is currently having urinary symptoms, can order Abx for him  Thanks!

## 2021-06-28 NOTE — TELEPHONE ENCOUNTER
Patient of Dr Collin Urban seen in the [facility] office  Patient called and advised that he had recent labs done and was advised to follow up with the labs  Patient advised that he is not sure what labs he needed to follow up on  After reviewing chart patient had urine test on 06/22/2021  Please advise

## 2021-07-04 DIAGNOSIS — R09.89 LEFT CAROTID BRUIT: ICD-10-CM

## 2021-07-05 RX ORDER — METOPROLOL SUCCINATE 100 MG/1
TABLET, EXTENDED RELEASE ORAL
Qty: 30 TABLET | Refills: 0 | Status: SHIPPED | OUTPATIENT
Start: 2021-07-05 | End: 2021-08-12

## 2021-07-08 NOTE — TELEPHONE ENCOUNTER
As a follow-up, a second attempt has been made for outreach via fax, please see Contacts section for details      Thank you  Ashley Cruz

## 2021-07-19 NOTE — TELEPHONE ENCOUNTER
As a final attempt, a third outreach has been made via telephone call  Please see Contacts section for details  This encounter will be closed and completed by end of day  Should we receive the requested information because of previous outreach attempts, the requested patient's chart will be updated appropriately  [facility]      Thank you  Noble La

## 2021-07-19 NOTE — TELEPHONE ENCOUNTER
I spoke to the patient and he is going to try and contact them as well  When I looked in media though I thought I saw the report there?

## 2021-07-23 ENCOUNTER — OFFICE VISIT (OUTPATIENT)
Dept: INTERNAL MEDICINE CLINIC | Facility: CLINIC | Age: 76
End: 2021-07-23
Payer: MEDICARE

## 2021-07-23 VITALS
DIASTOLIC BLOOD PRESSURE: 84 MMHG | HEIGHT: 75 IN | RESPIRATION RATE: 12 BRPM | HEART RATE: 80 BPM | WEIGHT: 240 LBS | BODY MASS INDEX: 29.84 KG/M2 | SYSTOLIC BLOOD PRESSURE: 130 MMHG | TEMPERATURE: 97.6 F

## 2021-07-23 DIAGNOSIS — E11.22 TYPE 2 DIABETES MELLITUS WITH STAGE 3 CHRONIC KIDNEY DISEASE, WITH LONG-TERM CURRENT USE OF INSULIN, UNSPECIFIED WHETHER STAGE 3A OR 3B CKD (HCC): Primary | ICD-10-CM

## 2021-07-23 DIAGNOSIS — Z79.4 TYPE 2 DIABETES MELLITUS WITH STAGE 3 CHRONIC KIDNEY DISEASE, WITH LONG-TERM CURRENT USE OF INSULIN, UNSPECIFIED WHETHER STAGE 3A OR 3B CKD (HCC): Primary | ICD-10-CM

## 2021-07-23 DIAGNOSIS — Z95.5 PRESENCE OF STENT IN CORONARY ARTERY: ICD-10-CM

## 2021-07-23 DIAGNOSIS — E78.2 MIXED HYPERLIPIDEMIA: ICD-10-CM

## 2021-07-23 DIAGNOSIS — C67.9 MALIGNANT NEOPLASM OF URINARY BLADDER, UNSPECIFIED SITE (HCC): ICD-10-CM

## 2021-07-23 DIAGNOSIS — I25.10 CORONARY ARTERY DISEASE INVOLVING NATIVE CORONARY ARTERY OF NATIVE HEART WITHOUT ANGINA PECTORIS: ICD-10-CM

## 2021-07-23 DIAGNOSIS — N18.4 CKD (CHRONIC KIDNEY DISEASE) STAGE 4, GFR 15-29 ML/MIN (HCC): ICD-10-CM

## 2021-07-23 DIAGNOSIS — G62.9 NEUROPATHY: ICD-10-CM

## 2021-07-23 DIAGNOSIS — N18.30 TYPE 2 DIABETES MELLITUS WITH STAGE 3 CHRONIC KIDNEY DISEASE, WITH LONG-TERM CURRENT USE OF INSULIN, UNSPECIFIED WHETHER STAGE 3A OR 3B CKD (HCC): Primary | ICD-10-CM

## 2021-07-23 DIAGNOSIS — I73.9 PAD (PERIPHERAL ARTERY DISEASE) (HCC): ICD-10-CM

## 2021-07-23 DIAGNOSIS — F32.1 MODERATE MAJOR DEPRESSION, SINGLE EPISODE (HCC): ICD-10-CM

## 2021-07-23 DIAGNOSIS — E11.42 DIABETIC POLYNEUROPATHY ASSOCIATED WITH TYPE 2 DIABETES MELLITUS (HCC): ICD-10-CM

## 2021-07-23 DIAGNOSIS — I45.9 STOKES-ADAMS SYNCOPE: ICD-10-CM

## 2021-07-23 PROCEDURE — 99214 OFFICE O/P EST MOD 30 MIN: CPT | Performed by: INTERNAL MEDICINE

## 2021-07-23 RX ORDER — GABAPENTIN 300 MG/1
300 CAPSULE ORAL
Qty: 90 CAPSULE | Refills: 3 | Status: SHIPPED | OUTPATIENT
Start: 2021-07-23 | End: 2021-10-22

## 2021-07-23 RX ORDER — PANTOPRAZOLE SODIUM 40 MG/1
40 TABLET, DELAYED RELEASE ORAL
Qty: 90 TABLET | Refills: 1
Start: 2021-07-23 | End: 2021-10-12

## 2021-07-23 RX ORDER — ISOSORBIDE MONONITRATE 30 MG/1
30 TABLET, EXTENDED RELEASE ORAL
COMMUNITY
End: 2021-12-07 | Stop reason: SDUPTHER

## 2021-07-23 RX ORDER — GABAPENTIN 600 MG/1
600 TABLET ORAL 3 TIMES DAILY
Qty: 90 TABLET | Refills: 2 | Status: SHIPPED | OUTPATIENT
Start: 2021-07-23 | End: 2021-10-22

## 2021-07-23 NOTE — PROGRESS NOTES
Assessment/Plan:  No change in medication or plan he looks good I renew the gabapentin as well as the Protonix      Problem 1  Diabetic neuropathy he is taking gabapentin which is working he has takes quite a bit of gabapentin and he is tolerating and even though he has renal dysfunction he said it was started in the hospital   I reorder the medication I cautiously told him if he feels lethargic please cut down the dose seems to be very satisfied with the does that he is taking now he is ambulating with a cane    Problem 2  Diabetes follows up with endocrinology no polyuria polydipsia will check a hemoglobin A1c and lipid profile microalbumin creatinine ratio    Problem 3  Bladder cancer no hematuria no difficulty in voiding which is good news  Problem 4  Chronic renal failure stage 3 bordering stage IV continue with good blood pressure control    Problem 5  Coronary artery disease stable no evidence of heart failure is euvolemic he denies any angina  He does follow-up with cardiology he has had no episodes of syncope and no ER visits which is good news     Recently he twisted his ankle he had been go to the ER had evaluated  Getting better he still has some swelling is ambulating with a cane we did review the rice protocol rest ice compression and elevation    No problem-specific Assessment & Plan notes found for this encounter         Diagnoses and all orders for this visit:    Type 2 diabetes mellitus with stage 3 chronic kidney disease, with long-term current use of insulin, unspecified whether stage 3a or 3b CKD ([facility] [address] )    Diabetic polyneuropathy associated with type 2 diabetes mellitus ([facility] [address] )    Coronary artery disease involving native coronary artery of native heart without angina pectoris    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    PAD (peripheral artery disease) ([facility])    Malignant neoplasm of urinary bladder, unspecified site ([facility])    CKD (chronic kidney disease) stage 4, GFR 15-29 ml/min ([facility])    Moderate major depression, single episode ([facility] [address] )    Presence of stent in coronary artery    Mixed hyperlipidemia          Subjective:      Patient ID: Alina Bob is a 64 y o  male  No chief complaint on file  Current Outpatient Medications:     acetaminophen (TYLENOL) 325 mg tablet, Take 650 mg by mouth every 6 (six) hours as needed for mild pain, Disp: , Rfl:     ALPRAZolam (XANAX) 0 25 mg tablet, At twice a day as needed for anxiety (Patient not taking: Reported on 6/24/2021), Disp: 30 tablet, Rfl: 0    amoxicillin (AMOXIL) 500 mg capsule, , Disp: , Rfl:     aspirin 81 mg chewable tablet, Chew 1 tablet (81 mg total) daily, Disp: 30 tablet, Rfl: 0    Azelastine HCl 0 15 % SOLN, Inhale 1 spray 2 (two) times a day (Patient taking differently: Inhale 1 spray 2 (two) times a day as needed ), Disp: 30 mL, Rfl: 3    Bimatoprost (LUMIGAN OP), Apply to eye (Patient not taking: Reported on 6/24/2021), Disp: , Rfl:     bismuth subsalicylate (PEPTO BISMOL) 262 MG chewable tablet, Chew 524 mg daily as needed for indigestion  , Disp: , Rfl:     calcitriol (ROCALTROL) 0 25 mcg capsule, Take 1 capsule (0 25 mcg total) by mouth daily, Disp: 30 capsule, Rfl: 1    docusate sodium (COLACE) 100 mg capsule, Take 1 capsule (100 mg total) by mouth 2 (two) times a day, Disp: 10 capsule, Rfl: 0    DULoxetine (CYMBALTA) 20 mg capsule, Take 1 capsule (20 mg total) by mouth daily, Disp: 30 capsule, Rfl: 1    ezetimibe (ZETIA) 10 mg tablet, 1 tablet, Disp: , Rfl:     Ferrous Sulfate Dried (Feosol) 200 (65 Fe) MG TABS, Take 65 mg by mouth daily, Disp: 30 each, Rfl: 1    fluocinonide (LIDEX) 0 05 % cream, Apply topically 2 (two) times a day, Disp: 30 g, Rfl: 0    fluticasone (FLONASE) 50 mcg/act nasal spray, One spray in each nostril twice a day, Disp: 1 Bottle, Rfl: 3    furosemide (LASIX) 20 mg tablet, TAKE 1 TABLET BY MOUTH AS NEEDED FOR EDEMA, Disp: 90 tablet, Rfl: 1    gabapentin (NEURONTIN) 300 mg capsule, Take 1 capsule (300 mg total) by mouth daily at bedtime, Disp: 90 capsule, Rfl: 3    gabapentin (NEURONTIN) 600 MG tablet, Take 300 mg by mouth 3 (three) times a day, Disp: , Rfl:     Insulin Pen Needle 31G X 8 MM MISC, Inject 3 times a day, Disp: 100 each, Rfl: 2    isosorbide mononitrate (IMDUR) 30 mg 24 hr tablet, Take 3 tablets (90 mg total) by mouth daily (Patient taking differently: Take 30 mg by mouth daily ), Disp: 90 tablet, Rfl: 0    Lantus SoloStar 100 units/mL injection pen, 18 units qhs, Disp: 30 mL, Rfl: 1    latanoprost (XALATAN) 0 005 % ophthalmic solution, Administer 1 drop to both eyes daily at bedtime, Disp: , Rfl:     loperamide (IMODIUM) 2 mg capsule, Take 2 mg by mouth 4 (four) times a day as needed for diarrhea, Disp: , Rfl:     metoprolol succinate (TOPROL-XL) 100 mg 24 hr tablet, TAKE 1 TABLET(100 MG) BY MOUTH DAILY, Disp: 30 tablet, Rfl: 0    Mirabegron ER (Myrbetriq) 50 MG TB24, Take 1 tablet (50 mg total) by mouth daily, Disp: 30 tablet, Rfl: 1    multivitamin (THERAGRAN) TABS, Take 1 tablet by mouth daily  , Disp: , Rfl:     NovoLOG FlexPen 100 units/mL injection pen, 10 units with each meal , Disp: 5 pen, Rfl: 1    omeprazole (PriLOSEC) 20 mg delayed release capsule, Take 20 mg by mouth daily, Disp: , Rfl:     OXYCODONE HCL ER PO, Take by mouth, Disp: , Rfl:     pantoprazole (PROTONIX) 40 mg tablet, Take 1 tablet (40 mg total) by mouth daily in the early morning, Disp: 30 tablet, Rfl: 0    sertraline (ZOLOFT) 50 mg tablet, Take 1 tablet (50 mg total) by mouth daily, Disp: 30 tablet, Rfl: 3    tamsulosin (FLOMAX) 0 4 mg, Take 1 capsule (0 4 mg total) by mouth daily at bedtime, Disp: 30 capsule, Rfl: 0    valACYclovir (VALTREX) 1,000 mg tablet, Take 1 tablet (1,000 mg total) by mouth 2 (two) times a day for 7 days, Disp: 14 tablet, Rfl: 0    HPI    The following portions of the patient's history were reviewed and updated as appropriate: allergies, current medications, past family history, past medical history, past social history, past surgical history and problem list     Review of Systems   Constitutional: Negative  Negative for activity change, appetite change, fatigue, fever and unexpected weight change  HENT: Negative for congestion, ear pain, hearing loss, mouth sores, postnasal drip, rhinorrhea, sore throat, trouble swallowing and voice change  Eyes: Negative for pain, redness and visual disturbance  Respiratory: Negative for cough, chest tightness, shortness of breath and wheezing  Cardiovascular: Negative for chest pain, palpitations and leg swelling  Gastrointestinal: Negative for abdominal distention, abdominal pain, blood in stool, constipation, diarrhea and nausea  Endocrine: Negative for cold intolerance, heat intolerance, polydipsia, polyphagia and polyuria  Genitourinary: Negative for difficulty urinating, dysuria, flank pain, frequency, hematuria and urgency  Musculoskeletal: Positive for arthralgias  Negative for back pain, gait problem, joint swelling and myalgias  Skin: Negative for color change and pallor  Neurological: Negative for dizziness, tremors, seizures, syncope, weakness, numbness and headaches  Hematological: Negative for adenopathy  Does not bruise/bleed easily  Psychiatric/Behavioral: Negative  Negative for sleep disturbance  The patient is not nervous/anxious  Objective: There were no vitals taken for this visit  Physical Exam  Vitals and nursing note reviewed  Constitutional:       Appearance: He is well-developed  HENT:      Head: Normocephalic  Right Ear: External ear normal       Left Ear: External ear normal       Nose: Nose normal       Mouth/Throat:      Pharynx: No oropharyngeal exudate  Eyes:      Conjunctiva/sclera: Conjunctivae normal       Pupils: Pupils are equal, round, and reactive to light  Neck:      Thyroid: No thyromegaly  Cardiovascular:      Rate and Rhythm: Normal rate and regular rhythm  Heart sounds: Normal heart sounds  No murmur heard  No friction rub  No gallop  Comments: S1-S2 regular rhythm  Extremities no edema  Pulmonary:      Effort: Pulmonary effort is normal  No respiratory distress  Breath sounds: Normal breath sounds  No wheezing or rales  Comments: Lungs are clear no wheezing rales or rhonchi  Abdominal:      General: Bowel sounds are normal  There is no distension  Palpations: Abdomen is soft  There is no mass  Tenderness: There is no abdominal tenderness  There is no guarding or rebound  Musculoskeletal:         General: Normal range of motion  Cervical back: Normal range of motion and neck supple  Lymphadenopathy:      Cervical: No cervical adenopathy  Skin:     General: Skin is warm and dry  Neurological:      Mental Status: He is alert and oriented to person, place, and time     Psychiatric:         Behavior: Behavior normal          Judgment: Judgment normal

## 2021-07-26 ENCOUNTER — TELEPHONE (OUTPATIENT)
Dept: INTERNAL MEDICINE CLINIC | Facility: CLINIC | Age: 76
End: 2021-07-26

## 2021-07-26 DIAGNOSIS — R19.7 DIARRHEA, UNSPECIFIED TYPE: Primary | ICD-10-CM

## 2021-07-26 NOTE — TELEPHONE ENCOUNTER
Phone call from Kristofer:  C/o diarrhea and upset stomach for more than 1 week  He had it when he saw Dr Jaime Bethea last week and did not mention it, he thought it would go away  He is using Omeprazole and Pepto Bismol  As per Mago Ceballos, he needs to be tested for c-diff  I spoke to Kristofer, he will go to the lab today

## 2021-07-27 ENCOUNTER — CONSULT (OUTPATIENT)
Dept: GASTROENTEROLOGY | Facility: MEDICAL CENTER | Age: 76
End: 2021-07-27
Payer: MEDICARE

## 2021-07-27 VITALS
TEMPERATURE: 98.4 F | WEIGHT: 237.6 LBS | HEART RATE: 56 BPM | SYSTOLIC BLOOD PRESSURE: 114 MMHG | DIASTOLIC BLOOD PRESSURE: 68 MMHG | BODY MASS INDEX: 30.1 KG/M2

## 2021-07-27 DIAGNOSIS — Z12.11 COLON CANCER SCREENING: ICD-10-CM

## 2021-07-27 PROCEDURE — 99203 OFFICE O/P NEW LOW 30 MIN: CPT | Performed by: INTERNAL MEDICINE

## 2021-07-27 NOTE — PROGRESS NOTES
[facility] - Outpatient Consultation  Erich White 64 y o  male MRN: [id number]  Encounter: [id number]    HPI:    Erich White is a 64 y o  male who presents to discuss screening colonoscopy  Last colonoscopy in 2016 with 5 tiny polyps and was told to return in 5 years (path not available)  No FH of colon cancer  He has had occasional blood in the stools  He developed diarrhea last week but is seems to be improving  He also had nausea and abdominal pain  He says that he episodically has diarrhea which might be triggered by salads and vegetables and dairy (he is lactose intolerant)  He thinks he has lost 3 lbs since last week, but otherwise weight is stable  He has h/o gastric bypass in 2011 and lost 100+ lbs  He has CAD with multiple stenting  He is off Plavix  No CP  Some STRONG  He has DM with toe amputation in December  REVIEW OF SYSTEMS:  CONSTITUTIONAL: Denies any fever, chills, rigors, and weight loss  HEENT: No earache or tinnitus  Denies hearing loss or visual disturbances  CARDIOVASCULAR: No chest pain or palpitations  RESPIRATORY: Denies any cough, hemoptysis, shortness of breath or dyspnea on exertion  GASTROINTESTINAL: As noted in the History of Present Illness  GENITOURINARY: No problems with urination  Denies any hematuria or dysuria  NEUROLOGIC: No dizziness or vertigo, denies headaches  MUSCULOSKELETAL: Denies any muscle or joint pain  SKIN: Denies skin rashes or itching  ENDOCRINE: Denies excessive thirst  Denies intolerance to heat or cold  PSYCHOSOCIAL: Denies depression or anxiety  Denies any recent memory loss       Historical Information   Past Medical History:   Diagnosis Date    Anemia     Last assessed: 9/28/17    Anxiety     Arteriosclerotic cardiovascular disease     Last assessed: 9/28/17    Arthritis     Bladder cancer ([facility] [address] )     bladder- had BCG treatments    Chronic kidney disease     CKD (chronic kidney disease) stage 4, GFR 15-29 ml/min ([facility]     Colon polyp     Coronary artery disease     7 stents    Depression     Diabetes mellitus ([facility])     IDDM    GERD (gastroesophageal reflux disease)     Glaucoma     History of fusion of cervical spine     Hyperlipidemia     Hypertension     Insomnia     Last assessed: 11/14/12    Loss of hearing     has hearing aids but usually does not wear them    Other seasonal allergic rhinitis     Last assessed: 2/10/16    PAD (peripheral artery disease) ([facility]     Shortness of breath     on exertion    Spinal stenosis of lumbar region     Transient cerebral ischemia     Uses walker     w/c for longer distances     Past Surgical History:   Procedure Laterality Date    CARDIAC SURGERY      Cath stent placement  Last assessed: 3/9/17  Interventional Catheterization    CHOLECYSTECTOMY      COLONOSCOPY      CYSTOSCOPY      Diagnostic w/biopsy  Birdena Pesa  Last assessed: 12/1/14    CYSTOURETHROSCOPY      w/cautery  Birdena Pesa    GASTRIC BYPASS      For morbid obesity w/Shaji-en-Y   Resolved: 11/17/09    INCISION AND DRAINAGE OF WOUND Right 2/26/2017    Procedure: INCISION AND DRAINAGE (I&D) EXTREMITY WITH APPLICATION OF GRAFT JACKET;  Surgeon: Bong Fitzgerald DPM;  [facility];  Service:     INCISION AND DRAINAGE OF WOUND Right 4/25/2017    Procedure: INCISION AND DRAINAGE (I&D) EXTREMITY, APPLICATION OF GRAFT;  Surgeon: Bong Fitzgerald DPM;  [facility];  Service:     IR BIOPSY OTHER  7/2/2020    IR LOWER EXTREMITY ANGIOGRAM  2/8/2021    IR LOWER EXTREMITY ANGIOGRAM  2/11/2021    IR TUNNELED CENTRAL LINE PLACEMENT  12/24/2020    JOINT REPLACEMENT      christofer knees replaced    VA AMPUTATION METATARSAL+TOE,SINGLE Left 12/21/2020    Procedure: RAY RESECTION FOOT;  Surgeon: Josh Stevens DPM;  [facility];  Service: Podiatry    VA AMPUTATION METATARSAL+TOE,SINGLE Left 12/31/2020    Procedure: 5TH MET RESECTION;  Surgeon: Nicolle Amos DPM;  [facility];  Service: Podiatry    ND CYSTOURETHROSCOPY W/IRRIG & EVAC CLOTS N/A 2/10/2021    Procedure: CYSTOSCOPY EVACUATION OF CLOTS, fulguration;  Surgeon: Yamini Leavitt MD;  [facility];  Service: Urology    ND CYSTOURETHROSCOPY,BIOPSY N/A 2016    Procedure: Rentatyana Alejoman;  Surgeon: Edwin Corbin MD;  [facility];  Service: Urology    ND CYSTOURETHROSCOPY,FULGUR <0 5 CM LESN N/A 2020    Procedure: CYSTO W/BIOPSIES, transurethral prostate bx;  Surgeon: Christin Bauer MD;  [facility];  Service: Urology    ND Dellis  3RD+ ORD SLCTV ABDL PEL/LXTR [facility] Left 2021    Procedure: LEG angiogram, CO2 w/limited contrast with balloon angioplasty postertior tibial artery;  Surgeon: Kaitlin Benietz MD;  [facility];  Service: Vascular    ROTATOR CUFF REPAIR      SMALL INTESTINE SURGERY      Surgery Shaji-en-Y    SPINAL FUSION      lumbar and cervical fusions    VAC DRESSING APPLICATION Right     Procedure: APPLICATION VAC DRESSING;  Surgeon: Justin Smith DPM;  [facility];  Service:     WOUND DEBRIDEMENT Left 2021    Procedure: FOOT DEBRIDE, [facility] OUT w/graft application;  Surgeon: Justin Smith DPM;  [facility];  Service: Podiatry     Social History   Social History     Substance and Sexual Activity   Alcohol Use Not Currently    Comment: beer / liquor     Social History     Substance and Sexual Activity   Drug Use Not Currently    Types: Marijuana    Comment: quit 2019 had medical marijuana     Social History     Tobacco Use   Smoking Status Former Smoker    Packs/day: 3 00    Years: 27 00    Pack years: 81 00    Types: Cigarettes    Quit date: 5    Years since quittin 6   Smokeless Tobacco Never Used     Family History   Problem Relation Age of Onset    Diabetes Mother     Heart disease Mother     Other Mother         High blood pressure    Heart disease Father     Diabetes Sister     Other Sister         High blood pressure    Kidney disease Sister     Heart disease Brother     Other Brother         High blood pressure       Meds/Allergies       Current Outpatient Medications:     acetaminophen (TYLENOL) 325 mg tablet    ALPRAZolam (XANAX) 0 25 mg tablet    Azelastine HCl 0 15 % SOLN    Bimatoprost (LUMIGAN OP)    bismuth subsalicylate (PEPTO BISMOL) 262 MG chewable tablet    calcitriol (ROCALTROL) 0 25 mcg capsule    docusate sodium (COLACE) 100 mg capsule    DULoxetine (CYMBALTA) 20 mg capsule    ezetimibe (ZETIA) 10 mg tablet    fluocinonide (LIDEX) 0 05 % cream    fluticasone (FLONASE) 50 mcg/act nasal spray    furosemide (LASIX) 20 mg tablet    gabapentin (NEURONTIN) 300 mg capsule    gabapentin (NEURONTIN) 600 MG tablet    Insulin Pen Needle 31G X 8 MM MISC    isosorbide mononitrate (IMDUR) 30 mg 24 hr tablet    Lantus SoloStar 100 units/mL injection pen    latanoprost (XALATAN) 0 005 % ophthalmic solution    loperamide (IMODIUM) 2 mg capsule    metoprolol succinate (TOPROL-XL) 100 mg 24 hr tablet    Mirabegron ER (Myrbetriq) 50 MG TB24    multivitamin (THERAGRAN) TABS    NovoLOG FlexPen 100 units/mL injection pen    omeprazole (PriLOSEC) 20 mg delayed release capsule    OXYCODONE HCL ER PO    pantoprazole (PROTONIX) 40 mg tablet    amoxicillin (AMOXIL) 500 mg capsule    aspirin 81 mg chewable tablet    Ferrous Sulfate Dried (Feosol) 200 (65 Fe) MG TABS    isosorbide mononitrate (IMDUR) 30 mg 24 hr tablet    sertraline (ZOLOFT) 50 mg tablet    tamsulosin (FLOMAX) 0 4 mg    valACYclovir (VALTREX) 1,000 mg tablet    Allergies   Allergen Reactions    Atorvastatin Hives, Itching and Rash    Simvastatin Rash and Edema     "ALL STATINS"    Statins Itching    Insulin Lispro Swelling and Edema    Medical Tape      rash to electrodes    Other Itching, Rash and Other (See Comments)     rash to electrodes and adhesives Objective   Blood pressure 114/68, pulse 56, temperature 98 4 °F (36 9 °C), weight 108 kg (237 lb 9 6 oz)  Body mass index is 30 1 kg/m²  PHYSICAL EXAM:    General Appearance:   Alert, cooperative, no distress   HEENT:   Normocephalic, atraumatic, anicteric  Neck:  Supple, symmetrical, trachea midline   Lungs:   Clear to auscultation bilaterally; no rales, rhonchi or wheezing; respirations unlabored    Heart[de-identified]   Regular rate and rhythm; no murmur, rub, or gallop  Abdomen:   Soft, non-tender, non-distended; normal bowel sounds; no masses, no organomegaly    Genitalia:   Deferred    Rectal:   Deferred    Extremities:  No cyanosis, clubbing or edema    Pulses:  2+ and symmetric    Skin:  No jaundice, rashes, or lesions    Lymph nodes:  No palpable cervical lymphadenopathy        Lab Results:   No visits with results within 1 Day(s) from this visit  Latest known visit with results is:   Office Visit on 07/23/2021   Component Date Value    Creatinine, Ur 07/28/2021 76 3     Microalbum  ,U,Random 07/28/2021 288 0*    Microalb Creat Ratio 07/28/2021 377*    Color, UA 07/28/2021 Yellow     Clarity, UA 07/28/2021 Clear     Specific Gravity, UA 07/28/2021 1 013     pH, UA 07/28/2021 6 0     Leukocytes, UA 07/28/2021 Small*    Nitrite, UA 07/28/2021 Negative     Protein, UA 07/28/2021 30 (1+)*    Glucose, UA 07/28/2021 Negative     Ketones, UA 07/28/2021 Negative     Urobilinogen, UA 07/28/2021 0 2     Bilirubin, UA 07/28/2021 Negative     Blood, UA 07/28/2021 Small*    RBC, UA 07/28/2021 None Seen     WBC, UA 07/28/2021 10-20*    Epithelial Cells 07/28/2021 None Seen     Bacteria, UA 07/28/2021 None Seen     Hyaline Casts, UA 07/28/2021 None Seen        Lab Results   Component Value Date    WBC 9 73 07/28/2021    HGB 12 1 07/28/2021    HCT 36 5 07/28/2021    MCV 94 07/28/2021     07/28/2021       Lab Results   Component Value Date     09/03/2015    SODIUM 138 07/28/2021    K 5 1 07/28/2021     07/28/2021    CO2 23 07/28/2021    ANIONGAP 5 09/03/2015    AGAP 8 07/28/2021    BUN 52 (H) 07/28/2021    CREATININE 2 95 (H) 07/28/2021    GLUC 108 02/17/2021    GLUF 57 (L) 07/28/2021    CALCIUM 8 8 07/28/2021    AST 40 07/28/2021    ALT 73 07/28/2021    ALKPHOS 105 07/28/2021    PROT 6 4 07/26/2015    TP 7 5 07/28/2021    BILITOT 0 50 07/26/2015    TBILI 0 41 07/28/2021    EGFR 22 07/28/2021       Lab Results   Component Value Date    CRP <3 0 07/09/2020       Lab Results   Component Value Date    SJZ0YUJZBAUT 1 840 07/28/2021       Lab Results   Component Value Date    IRON 50 (L) 02/17/2021    TIBC 285 02/17/2021    FERRITIN 31 06/22/2021       Radiology Results:   No results found  ______________________________________________________________________  ASSESSMENT AND PLAN:    Merline Saba is a 64 y o  male with CAD and multiple cardiac stents, DM, and chronic bronchitis  He is 63 yo  He had several tiny polyps on colonoscopy 5 years ago  I discussed with him available CRC screening modalities  Given his age and co-morbidities, I recommend Cologuard as a less invasive test compared to colonoscopy  If his Cologuard is positive, we will proceed with colonoscopy        1  Colon cancer screening      Orders Placed This Encounter   Procedures    Cologuard

## 2021-07-28 ENCOUNTER — APPOINTMENT (OUTPATIENT)
Dept: LAB | Facility: CLINIC | Age: 76
End: 2021-07-28
Payer: MEDICARE

## 2021-07-28 DIAGNOSIS — E11.22 TYPE 2 DIABETES MELLITUS WITH STAGE 3 CHRONIC KIDNEY DISEASE, WITH LONG-TERM CURRENT USE OF INSULIN, UNSPECIFIED WHETHER STAGE 3A OR 3B CKD (HCC): ICD-10-CM

## 2021-07-28 DIAGNOSIS — I45.9 STOKES-ADAMS SYNCOPE: ICD-10-CM

## 2021-07-28 DIAGNOSIS — Z95.5 PRESENCE OF STENT IN CORONARY ARTERY: ICD-10-CM

## 2021-07-28 DIAGNOSIS — F32.1 MODERATE MAJOR DEPRESSION, SINGLE EPISODE (HCC): ICD-10-CM

## 2021-07-28 DIAGNOSIS — Z79.4 TYPE 2 DIABETES MELLITUS WITH STAGE 3 CHRONIC KIDNEY DISEASE, WITH LONG-TERM CURRENT USE OF INSULIN, UNSPECIFIED WHETHER STAGE 3A OR 3B CKD (HCC): ICD-10-CM

## 2021-07-28 DIAGNOSIS — C67.9 MALIGNANT NEOPLASM OF URINARY BLADDER, UNSPECIFIED SITE (HCC): ICD-10-CM

## 2021-07-28 DIAGNOSIS — I25.10 CORONARY ARTERY DISEASE INVOLVING NATIVE CORONARY ARTERY OF NATIVE HEART WITHOUT ANGINA PECTORIS: ICD-10-CM

## 2021-07-28 DIAGNOSIS — I73.9 PAD (PERIPHERAL ARTERY DISEASE) (HCC): ICD-10-CM

## 2021-07-28 DIAGNOSIS — N18.30 TYPE 2 DIABETES MELLITUS WITH STAGE 3 CHRONIC KIDNEY DISEASE, WITH LONG-TERM CURRENT USE OF INSULIN, UNSPECIFIED WHETHER STAGE 3A OR 3B CKD (HCC): ICD-10-CM

## 2021-07-28 DIAGNOSIS — E11.42 DIABETIC POLYNEUROPATHY ASSOCIATED WITH TYPE 2 DIABETES MELLITUS (HCC): ICD-10-CM

## 2021-07-28 LAB
ALBUMIN SERPL BCP-MCNC: 3.8 G/DL (ref 3.5–5)
ALP SERPL-CCNC: 105 U/L (ref 46–116)
ALT SERPL W P-5'-P-CCNC: 73 U/L (ref 12–78)
ANION GAP SERPL CALCULATED.3IONS-SCNC: 8 MMOL/L (ref 4–13)
AST SERPL W P-5'-P-CCNC: 40 U/L (ref 5–45)
BACTERIA UR QL AUTO: ABNORMAL /HPF
BASOPHILS # BLD AUTO: 0.05 THOUSANDS/ΜL (ref 0–0.1)
BASOPHILS NFR BLD AUTO: 1 % (ref 0–1)
BILIRUB SERPL-MCNC: 0.41 MG/DL (ref 0.2–1)
BILIRUB UR QL STRIP: NEGATIVE
BUN SERPL-MCNC: 52 MG/DL (ref 5–25)
CALCIUM SERPL-MCNC: 8.8 MG/DL (ref 8.3–10.1)
CHLORIDE SERPL-SCNC: 107 MMOL/L (ref 100–108)
CHOLEST SERPL-MCNC: 123 MG/DL (ref 50–200)
CLARITY UR: CLEAR
CO2 SERPL-SCNC: 23 MMOL/L (ref 21–32)
COLOR UR: YELLOW
CREAT SERPL-MCNC: 2.95 MG/DL (ref 0.6–1.3)
CREAT UR-MCNC: 76.3 MG/DL
EOSINOPHIL # BLD AUTO: 0.26 THOUSAND/ΜL (ref 0–0.61)
EOSINOPHIL NFR BLD AUTO: 3 % (ref 0–6)
ERYTHROCYTE [DISTWIDTH] IN BLOOD BY AUTOMATED COUNT: 14.1 % (ref 11.6–15.1)
EST. AVERAGE GLUCOSE BLD GHB EST-MCNC: 180 MG/DL
GFR SERPL CREATININE-BSD FRML MDRD: 22 ML/MIN/1.73SQ M
GGT SERPL-CCNC: 57 U/L (ref 5–85)
GLUCOSE P FAST SERPL-MCNC: 57 MG/DL (ref 65–99)
GLUCOSE UR STRIP-MCNC: NEGATIVE MG/DL
HBA1C MFR BLD: 7.9 %
HCT VFR BLD AUTO: 36.5 % (ref 36.5–49.3)
HDLC SERPL-MCNC: 36 MG/DL
HGB BLD-MCNC: 12.1 G/DL (ref 12–17)
HGB UR QL STRIP.AUTO: ABNORMAL
HYALINE CASTS #/AREA URNS LPF: ABNORMAL /LPF
IMM GRANULOCYTES # BLD AUTO: 0.03 THOUSAND/UL (ref 0–0.2)
IMM GRANULOCYTES NFR BLD AUTO: 0 % (ref 0–2)
KETONES UR STRIP-MCNC: NEGATIVE MG/DL
LDLC SERPL CALC-MCNC: 55 MG/DL (ref 0–100)
LEUKOCYTE ESTERASE UR QL STRIP: ABNORMAL
LYMPHOCYTES # BLD AUTO: 3.22 THOUSANDS/ΜL (ref 0.6–4.47)
LYMPHOCYTES NFR BLD AUTO: 33 % (ref 14–44)
MAGNESIUM SERPL-MCNC: 2.5 MG/DL (ref 1.6–2.6)
MCH RBC QN AUTO: 31.2 PG (ref 26.8–34.3)
MCHC RBC AUTO-ENTMCNC: 33.2 G/DL (ref 31.4–37.4)
MCV RBC AUTO: 94 FL (ref 82–98)
MICROALBUMIN UR-MCNC: 288 MG/L (ref 0–20)
MICROALBUMIN/CREAT 24H UR: 377 MG/G CREATININE (ref 0–30)
MONOCYTES # BLD AUTO: 0.77 THOUSAND/ΜL (ref 0.17–1.22)
MONOCYTES NFR BLD AUTO: 8 % (ref 4–12)
NEUTROPHILS # BLD AUTO: 5.4 THOUSANDS/ΜL (ref 1.85–7.62)
NEUTS SEG NFR BLD AUTO: 55 % (ref 43–75)
NITRITE UR QL STRIP: NEGATIVE
NON-SQ EPI CELLS URNS QL MICRO: ABNORMAL /HPF
NRBC BLD AUTO-RTO: 0 /100 WBCS
PH UR STRIP.AUTO: 6 [PH]
PLATELET # BLD AUTO: 187 THOUSANDS/UL (ref 149–390)
PMV BLD AUTO: 11.6 FL (ref 8.9–12.7)
POTASSIUM SERPL-SCNC: 5.1 MMOL/L (ref 3.5–5.3)
PROT SERPL-MCNC: 7.5 G/DL (ref 6.4–8.2)
PROT UR STRIP-MCNC: ABNORMAL MG/DL
RBC # BLD AUTO: 3.88 MILLION/UL (ref 3.88–5.62)
RBC #/AREA URNS AUTO: ABNORMAL /HPF
SODIUM SERPL-SCNC: 138 MMOL/L (ref 136–145)
SP GR UR STRIP.AUTO: 1.01 (ref 1–1.03)
TRIGL SERPL-MCNC: 159 MG/DL
TSH SERPL DL<=0.05 MIU/L-ACNC: 1.84 UIU/ML (ref 0.36–3.74)
UROBILINOGEN UR QL STRIP.AUTO: 0.2 E.U./DL
WBC # BLD AUTO: 9.73 THOUSAND/UL (ref 4.31–10.16)
WBC #/AREA URNS AUTO: ABNORMAL /HPF

## 2021-07-28 PROCEDURE — 85025 COMPLETE CBC W/AUTO DIFF WBC: CPT

## 2021-07-28 PROCEDURE — 83036 HEMOGLOBIN GLYCOSYLATED A1C: CPT

## 2021-07-28 PROCEDURE — 81001 URINALYSIS AUTO W/SCOPE: CPT | Performed by: INTERNAL MEDICINE

## 2021-07-28 PROCEDURE — 84443 ASSAY THYROID STIM HORMONE: CPT

## 2021-07-28 PROCEDURE — 82043 UR ALBUMIN QUANTITATIVE: CPT | Performed by: INTERNAL MEDICINE

## 2021-07-28 PROCEDURE — 83735 ASSAY OF MAGNESIUM: CPT

## 2021-07-28 PROCEDURE — 36415 COLL VENOUS BLD VENIPUNCTURE: CPT

## 2021-07-28 PROCEDURE — 82570 ASSAY OF URINE CREATININE: CPT | Performed by: INTERNAL MEDICINE

## 2021-07-28 PROCEDURE — 80061 LIPID PANEL: CPT

## 2021-07-28 PROCEDURE — 80053 COMPREHEN METABOLIC PANEL: CPT

## 2021-07-28 PROCEDURE — 82977 ASSAY OF GGT: CPT

## 2021-07-30 ENCOUNTER — HOSPITAL ENCOUNTER (EMERGENCY)
Facility: HOSPITAL | Age: 76
Discharge: HOME | End: 2021-07-30
Attending: EMERGENCY MEDICINE
Payer: MEDICARE

## 2021-07-30 ENCOUNTER — APPOINTMENT (EMERGENCY)
Dept: NON INVASIVE DIAGNOSTICS | Facility: HOSPITAL | Age: 76
End: 2021-07-30
Payer: MEDICARE

## 2021-07-30 ENCOUNTER — APPOINTMENT (EMERGENCY)
Dept: RADIOLOGY | Facility: HOSPITAL | Age: 76
End: 2021-07-30
Payer: MEDICARE

## 2021-07-30 VITALS
DIASTOLIC BLOOD PRESSURE: 80 MMHG | RESPIRATION RATE: 16 BRPM | SYSTOLIC BLOOD PRESSURE: 148 MMHG | HEART RATE: 66 BPM | OXYGEN SATURATION: 98 % | TEMPERATURE: 98.2 F

## 2021-07-30 DIAGNOSIS — M79.672 LEFT FOOT PAIN: Primary | ICD-10-CM

## 2021-07-30 PROCEDURE — 93971 EXTREMITY STUDY: CPT

## 2021-07-30 PROCEDURE — 99284 EMERGENCY DEPT VISIT MOD MDM: CPT | Performed by: PHYSICIAN ASSISTANT

## 2021-07-30 PROCEDURE — 73610 X-RAY EXAM OF ANKLE: CPT

## 2021-07-30 PROCEDURE — 99284 EMERGENCY DEPT VISIT MOD MDM: CPT

## 2021-07-30 PROCEDURE — 93971 EXTREMITY STUDY: CPT | Performed by: SURGERY

## 2021-07-30 RX ORDER — ACETAMINOPHEN 325 MG/1
650 TABLET ORAL ONCE
Status: COMPLETED | OUTPATIENT
Start: 2021-07-30 | End: 2021-07-30

## 2021-07-30 RX ADMIN — ACETAMINOPHEN 650 MG: 325 TABLET, FILM COATED ORAL at 05:37

## 2021-07-30 NOTE — ED PROVIDER NOTES
History  Chief Complaint   Patient presents with    Foot Pain     "I think I have a clot in my foot" Pt states pain and swelling in left foot since last night  76-year-old male with relevant past medical history significant for peripheral arterial disease, type 2 diabetes mellitus, diabetic neuropathy, hyperlipidemia, stage IV CKD, osteoarthritis of left knee who presents to the emergency department for complaint of left foot pain and swelling  Patient reports symptoms started last night and progressed to this morning  He localizes symptoms to the right inner ankle region; denies skin color change  He has baseline neuropathy which he states is no worse  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 Taking?    ALPRAZolam (XANAX) 0 25 mg tablet  Self No Yes   Sig: At twice a day as needed for anxiety   Azelastine HCl 0 15 % SOLN  Self No Yes   Sig: Inhale 1 spray 2 (two) times a day   Patient taking differently: Inhale 1 spray 2 (two) times a day as needed    Bimatoprost (LUMIGAN OP)  Self Yes Yes   Sig: Apply to eye    DULoxetine (CYMBALTA) 20 mg capsule  Self No Yes   Sig: Take 1 capsule (20 mg total) by mouth daily   Ferrous Sulfate Dried (Feosol) 200 (65 Fe) MG TABS  Self No No   Sig: Take 65 mg by mouth daily   Insulin Pen Needle 31G X 8 MM MISC  Self No Yes   Sig: Inject 3 times a day   Lantus SoloStar 100 units/mL injection pen   No Yes   Si units qhs   Mirabegron ER (Myrbetriq) 50 MG TB24  Self No Yes   Sig: Take 1 tablet (50 mg total) by mouth daily   NovoLOG FlexPen 100 units/mL injection pen  Self No Yes   Sig: 10 units with each meal    OXYCODONE HCL ER PO  Self Yes Yes   Sig: Take by mouth   acetaminophen (TYLENOL) 325 mg tablet  Self Yes Yes   Sig: Take 650 mg by mouth every 6 (six) hours as needed for mild pain   amoxicillin (AMOXIL) 500 mg capsule  Self Yes No   Patient not taking: Reported on 2021   aspirin 81 mg chewable tablet  Self No No   Sig: Chew 1 tablet (81 mg total) daily   bismuth subsalicylate (PEPTO BISMOL) 262 MG chewable tablet  Self Yes Yes   Sig: Chew 524 mg daily as needed for indigestion  calcitriol (ROCALTROL) 0 25 mcg capsule  Self No Yes   Sig: Take 1 capsule (0 25 mcg total) by mouth daily   docusate sodium (COLACE) 100 mg capsule  Self No Yes   Sig: Take 1 capsule (100 mg total) by mouth 2 (two) times a day   ezetimibe (ZETIA) 10 mg tablet  Self Yes Yes   Si tablet   fluocinonide (LIDEX) 0 05 % cream  Self No Yes   Sig: Apply topically 2 (two) times a day   fluticasone (FLONASE) 50 mcg/act nasal spray  Self No Yes   Sig: One spray in each nostril twice a day   furosemide (LASIX) 20 mg tablet  Self No Yes   Sig: TAKE 1 TABLET BY MOUTH AS NEEDED FOR EDEMA   gabapentin (NEURONTIN) 300 mg capsule   No Yes   Sig: Take 1 capsule (300 mg total) by mouth daily at bedtime   gabapentin (NEURONTIN) 600 MG tablet   No Yes   Sig: Take 1 tablet (600 mg total) by mouth 3 (three) times a day   isosorbide mononitrate (IMDUR) 30 mg 24 hr tablet  Self No No   Sig: Take 3 tablets (90 mg total) by mouth daily   Patient taking differently: Take 30 mg by mouth daily    isosorbide mononitrate (IMDUR) 30 mg 24 hr tablet   Yes Yes   Sig: Take 30 mg by mouth daily   latanoprost (XALATAN) 0 005 % ophthalmic solution  Self Yes Yes   Sig: Administer 1 drop to both eyes daily at bedtime   loperamide (IMODIUM) 2 mg capsule  Self Yes Yes   Sig: Take 2 mg by mouth 4 (four) times a day as needed for diarrhea   metoprolol succinate (TOPROL-XL) 100 mg 24 hr tablet   No Yes   Sig: TAKE 1 TABLET(100 MG) BY MOUTH DAILY   multivitamin (THERAGRAN) TABS  Self Yes Yes   Sig: Take 1 tablet by mouth daily     omeprazole (PriLOSEC) 20 mg delayed release capsule  Self Yes Yes   Sig: Take 20 mg by mouth daily   pantoprazole (PROTONIX) 40 mg tablet   No Yes   Sig: Take 1 tablet (40 mg total) by mouth daily in the early morning   sertraline (ZOLOFT) 50 mg tablet  Self No No   Sig: Take 1 tablet (50 mg total) by mouth daily   tamsulosin (FLOMAX) 0 4 mg  Self No No   Sig: Take 1 capsule (0 4 mg total) by mouth daily at bedtime   valACYclovir (VALTREX) 1,000 mg tablet  Self No No   Sig: Take 1 tablet (1,000 mg total) by mouth 2 (two) times a day for 7 days      Facility-Administered Medications: None       Past Medical History:   Diagnosis Date    Anemia     Last assessed: 9/28/17    Anxiety     Arteriosclerotic cardiovascular disease     Last assessed: 9/28/17    Arthritis     Bladder cancer ([facility] [address] )     bladder- had BCG treatments    Chronic kidney disease     CKD (chronic kidney disease) stage 4, GFR 15-29 ml/min ([facility])     Colon polyp     Coronary artery disease     7 stents    Depression     Diabetes mellitus ([facility])     IDDM    GERD (gastroesophageal reflux disease)     Glaucoma     History of fusion of cervical spine     Hyperlipidemia     Hypertension     Insomnia     Last assessed: 11/14/12    Loss of hearing     has hearing aids but usually does not wear them    Other seasonal allergic rhinitis     Last assessed: 2/10/16    PAD (peripheral artery disease) ([facility])     Shortness of breath     on exertion    Spinal stenosis of lumbar region     Transient cerebral ischemia     Uses walker     w/c for longer distances       Past Surgical History:   Procedure Laterality Date    CARDIAC SURGERY      Cath stent placement  Last assessed: 3/9/17  Interventional Catheterization    CHOLECYSTECTOMY      COLONOSCOPY      CYSTOSCOPY      Diagnostic w/biopsy  Niru Chaudhary  Last assessed: 12/1/14    CYSTOURETHROSCOPY      w/cautery  Niru Chaudhary    GASTRIC BYPASS      For morbid obesity w/Shaji-en-Y   Resolved: 11/17/09    INCISION AND DRAINAGE OF WOUND Right 2/26/2017    Procedure: INCISION AND DRAINAGE (I&D) EXTREMITY WITH APPLICATION OF GRAFT JACKET;  Surgeon: Oscar Sykes DPM;  [facility];  Service:     INCISION AND DRAINAGE OF WOUND Right 4/25/2017    Procedure: INCISION AND DRAINAGE (I&D) EXTREMITY, APPLICATION OF GRAFT;  Surgeon: Oscar Sykes DPM;  [facility];  Service:     IR BIOPSY OTHER  7/2/2020    IR LOWER EXTREMITY ANGIOGRAM  2/8/2021    IR LOWER EXTREMITY ANGIOGRAM  2/11/2021    IR TUNNELED CENTRAL LINE PLACEMENT  12/24/2020    JOINT REPLACEMENT      christofer knees replaced    AL AMPUTATION METATARSAL+TOE,SINGLE Left 12/21/2020    Procedure: RAY RESECTION FOOT;  Surgeon: Montserrat Stephenson DPM;  [facility];  Service: Podiatry    AL AMPUTATION METATARSAL+TOE,SINGLE Left 12/31/2020    Procedure: 5TH MET RESECTION;  Surgeon: Montserrat Stephenson DPM;  [facility];  Service: Podiatry    AL CYSTOURETHROSCOPY W/IRRIG & EVAC CLOTS N/A 2/10/2021    Procedure: CYSTOSCOPY EVACUATION OF CLOTS, fulguration;  Surgeon: Divya Castillo MD;  [facility];  Service: Urology    AL CYSTOURETHROSCOPY,BIOPSY N/A 8/16/2016    Procedure: CYSTOSCOPY WITH BIOPSIES;  Surgeon: Radha Segovia MD;  [facility];  Service: Urology    AL CYSTOURETHROSCOPY,FULGUR <0 5 CM LESN N/A 11/19/2020    Procedure: CYSTO W/BIOPSIES, transurethral prostate bx;  Surgeon: Shagufta Franks MD;  [facility];  Service: Urology    AL [place] 3RD+ ORD [address] [facility] Left 2/8/2021    Procedure: LEG angiogram, CO2 w/limited contrast with balloon angioplasty postertior tibial artery;  Surgeon: Gamaliel Gruber MD;  [facility];  Service: Vascular    ROTATOR CUFF REPAIR      SMALL INTESTINE SURGERY      Surgery Shaji-en-Y    SPINAL FUSION      lumbar and cervical fusions    VAC DRESSING APPLICATION Right 5/64/9903    Procedure: APPLICATION VAC DRESSING;  Surgeon: Sena Baer Kennedy Ramos DPM;  [facility];  Service:     WOUND DEBRIDEMENT Left 2021    Procedure: FOOT DEBRIDE, [address] OUT w/graft application;  Surgeon: Felipe Salvador DPM;  [facility];  Service: Podiatry       Family History   Problem Relation Age of Onset    Diabetes Mother     Heart disease Mother     Other Mother         High blood pressure    Heart disease Father     Diabetes Sister     Other Sister         High blood pressure    Kidney disease Sister     Heart disease Brother     Other Brother         High blood pressure     I have reviewed and agree with the history as documented  E-Cigarette/Vaping    E-Cigarette Use Never User      E-Cigarette/Vaping Substances    Nicotine No     THC No     CBD No     Flavoring No     Other No     Unknown No      Social History     Tobacco Use    Smoking status: Former Smoker     Packs/day: 3 00     Years: 27 00     Pack years: 81 00     Types: Cigarettes     Quit date:      Years since quittin 6    Smokeless tobacco: Never Used   Vaping Use    Vaping Use: Never used   Substance Use Topics    Alcohol use: Not Currently     Comment: beer / liquor    Drug use: Not Currently     Types: Marijuana     Comment: quit 2019 had medical marijuana       Review of Systems   Musculoskeletal: Positive for arthralgias and joint swelling  Negative for gait problem  Skin: Negative for color change, rash and wound  Neurological: Positive for numbness (baseline)  Negative for weakness  All other systems reviewed and are negative  Physical Exam  Physical Exam  Vitals reviewed  Constitutional:       General: He is awake  He is not in acute distress  Appearance: Normal appearance  He is well-developed and well-groomed  He is not ill-appearing or toxic-appearing  HENT:      Head: Normocephalic and atraumatic  Mouth/Throat:      Lips: Pink        Mouth: Mucous membranes are moist    Eyes:      Extraocular Movements: Extraocular movements intact  Conjunctiva/sclera: Conjunctivae normal       Pupils: Pupils are equal, round, and reactive to light  Cardiovascular:      Rate and Rhythm: Normal rate and regular rhythm  Pulses: Normal pulses  Pulmonary:      Effort: Pulmonary effort is normal       Breath sounds: Normal breath sounds and air entry  Musculoskeletal:         General: Normal range of motion  Cervical back: Normal range of motion and neck supple  Left ankle: Swelling present  No deformity or ecchymosis  Tenderness present  No lateral malleolus, medial malleolus (posterior to medial malleolus) or proximal fibula tenderness  Normal range of motion  Left Achilles Tendon: Normal       Right foot: Normal pulse (DP 1+ palpably; PT 1+ found with doppler)  Left foot: Normal  Normal range of motion and normal capillary refill  No swelling or bony tenderness  Normal pulse (DP 1+ palpably; PT 1+ found with doppler)  Comments: Able to stand and ambulate    Skin:     General: Skin is warm  Capillary Refill: Capillary refill takes less than 2 seconds  Neurological:      Mental Status: He is alert and oriented to person, place, and time  Psychiatric:         Behavior: Behavior is cooperative           Vital Signs  ED Triage Vitals   Temperature Pulse Respirations Blood Pressure SpO2   07/30/21 0526 07/30/21 0524 07/30/21 0524 07/30/21 0524 07/30/21 0524   98 2 °F (36 8 °C) 70 18 158/88 97 %      Temp Source Heart Rate Source Patient Position - Orthostatic VS BP Location FiO2 (%)   07/30/21 0526 07/30/21 0524 07/30/21 0524 07/30/21 0524 --   Oral Monitor Sitting Right arm       Pain Score       07/30/21 0537       7           Vitals:    07/30/21 0524 07/30/21 0800   BP: 158/88 148/80   Pulse: 70 66   Patient Position - Orthostatic VS: Sitting          Visual Acuity      ED Medications  Medications   acetaminophen (TYLENOL) tablet 650 mg (650 mg Oral Given 7/30/21 0537)       Diagnostic Studies  Results Reviewed     None                 VAS lower limb venous duplex study, unilateral/limited   Final Result by Zahra Irvin MD (07/31 1317)      XR ankle 3+ views LEFT   ED Interpretation by Sebastian Asif PA-C (07/30 8513)   Vascular calcification; no bony process      Final Result by River Guerrero DO (07/30 7216)      No acute osseous abnormality  Workstation performed: [id number]                    Procedures  Procedures         ED Course  ED Course as of Aug 05 0358   Fri Jul 30, 2021   9799 Patient signed out to Regina Mata  X-ray reviewed by me, unremarkable  Dispo and plan dependent upon duplex results  SBIRT 22yo+      Most Recent Value   SBIRT (24 yo +)   In order to provide better care to our patients, we are screening all of our patients for alcohol and drug use  Would it be okay to ask you these screening questions? Yes Filed at: 07/30/2021 8917   Initial Alcohol Screen: US AUDIT-C    1  How often do you have a drink containing alcohol? 2 Filed at: 07/30/2021 0539   2  How many drinks containing alcohol do you have on a typical day you are drinking? 0 Filed at: 07/30/2021 0539   3a  Male UNDER 65: How often do you have five or more drinks on one occasion? 0 Filed at: 07/30/2021 0539   3b  FEMALE Any Age, or MALE 65+: How often do you have 4 or more drinks on one occassion? 0 Filed at: 07/30/2021 0539   Audit-C Score  2 Filed at: 07/30/2021 8736   MARIELLE: How many times in the past year have you    Used an illegal drug or used a prescription medication for non-medical reasons? Never Filed at: 07/30/2021 5904                    MDM  Number of Diagnoses or Management Options  Left foot pain  Diagnosis management comments: Mild swelling with palpable tenderness of region posterior to left medial malleolus  No skin color change  No changes consistent with cellulitis  No palpable lump to suggest cyst or lipoma    Will obtain x-ray to assess for bony lesion, doppler to r/o dvt  Also consider strain/sprain, mechanical issue (compensation from right ankle sprain)  Low suspicion for arterial clot, given pulses intact, no dusky or pale color, cap refill <2s, skin pink and warm, no pain out of proportion to exam         Amount and/or Complexity of Data Reviewed  Tests in the radiology section of CPT®: ordered and reviewed  Discussion of test results with the performing providers: yes  Decide to obtain previous medical records or to obtain history from someone other than the patient: yes  Obtain history from someone other than the patient: yes  Review and summarize past medical records: yes  Discuss the patient with other providers: yes  Independent visualization of images, tracings, or specimens: yes    Patient Progress  Patient progress: stable (See ED course note for dispo and plan  I reviewed and discussed all lab and imaging findings with the patient at bedside  I answered any and all questions the patient had regarding emergency department course of evaluation and treatment  The patient verbalized understanding of and agreement with plan   )      Disposition  Final diagnoses:   Left foot pain     Time reflects when diagnosis was documented in both MDM as applicable and the Disposition within this note     Time User Action Codes Description Comment    7/30/2021 10:32 AM Maryanne [place] Add [A32 123] Left foot pain       ED Disposition     ED Disposition Condition Date/Time Comment    Discharge Stable Fri Jul 30, 2021 10:32 AM Erich [place] discharge to home/self care  Follow-up Information     Follow up With Specialties Details Why [address] MD [facility] Schedule an appointment as soon as possible for a visit in 1 day  [address]   [facility] [address] [facility] Schedule an appointment as soon as possible for a visit  As needed [address]  [place] [address] [facility] [address]  [address] [place] [address], [address], [address], [facility], [address] [phone]          Discharge Medication List as of 7/30/2021 10:33 AM      CONTINUE these medications which have NOT CHANGED    Details   acetaminophen (TYLENOL) 325 mg tablet Take 650 mg by mouth every 6 (six) hours as needed for mild pain, Historical Med      ALPRAZolam (XANAX) 0 25 mg tablet At twice a day as needed for anxiety, Normal      Azelastine HCl 0 15 % SOLN Inhale 1 spray 2 (two) times a day, Starting Thu 5/14/2020, Normal      Bimatoprost (LUMIGAN OP) Apply to eye , Historical Med      bismuth subsalicylate (PEPTO BISMOL) 262 MG chewable tablet Chew 524 mg daily as needed for indigestion  , Historical Med      calcitriol (ROCALTROL) 0 25 mcg capsule Take 1 capsule (0 25 mcg total) by mouth daily, Starting Fri 5/7/2021, Normal      docusate sodium (COLACE) 100 mg capsule Take 1 capsule (100 mg total) by mouth 2 (two) times a day, Starting Wed 1/6/2021, No Print      DULoxetine (CYMBALTA) 20 mg capsule Take 1 capsule (20 mg total) by mouth daily, Starting Tue 4/20/2021, Normal      ezetimibe (ZETIA) 10 mg tablet 1 tablet, Starting Fri 1/22/2021, Historical Med      fluocinonide (LIDEX) 0 05 % cream Apply topically 2 (two) times a day, Starting Tue 5/18/2021, Normal      fluticasone (FLONASE) 50 mcg/act nasal spray One spray in each nostril twice a day, Normal      furosemide (LASIX) 20 mg tablet TAKE 1 TABLET BY MOUTH AS NEEDED FOR EDEMA, Normal      gabapentin (NEURONTIN) 300 mg capsule Take 1 capsule (300 mg total) by mouth daily at bedtime, Starting Fri 7/23/2021, Until Mon 7/18/2022, Normal      gabapentin (NEURONTIN) 600 MG tablet Take 1 tablet (600 mg total) by mouth 3 (three) times a day, Starting Fri 7/23/2021, Until u 10/21/2021, Normal Insulin Pen Needle 31G X 8 MM MISC Inject 3 times a day, Normal      isosorbide mononitrate (IMDUR) 30 mg 24 hr tablet Take 30 mg by mouth daily, Historical Med      Lantus SoloStar 100 units/mL injection pen 18 units qhs, Normal      latanoprost (XALATAN) 0 005 % ophthalmic solution Administer 1 drop to both eyes daily at bedtime, Historical Med      loperamide (IMODIUM) 2 mg capsule Take 2 mg by mouth 4 (four) times a day as needed for diarrhea, Historical Med      metoprolol succinate (TOPROL-XL) 100 mg 24 hr tablet TAKE 1 TABLET(100 MG) BY MOUTH DAILY, Normal      Mirabegron ER (Myrbetriq) 50 MG TB24 Take 1 tablet (50 mg total) by mouth daily, Starting Fri 5/7/2021, Normal      multivitamin (THERAGRAN) TABS Take 1 tablet by mouth daily  , Historical Med      NovoLOG FlexPen 100 units/mL injection pen 10 units with each meal , Normal      omeprazole (PriLOSEC) 20 mg delayed release capsule Take 20 mg by mouth daily, Historical Med      OXYCODONE HCL ER PO Take by mouth, Historical Med      pantoprazole (PROTONIX) 40 mg tablet Take 1 tablet (40 mg total) by mouth daily in the early morning, Starting Fri 7/23/2021, Until Thu 10/21/2021, No Print      amoxicillin (AMOXIL) 500 mg capsule Starting Wed 3/10/2021, Historical Med      aspirin 81 mg chewable tablet Chew 1 tablet (81 mg total) daily, Starting Thu 1/7/2021, Until Fri 7/23/2021, No Print      Ferrous Sulfate Dried (Feosol) 200 (65 Fe) MG TABS Take 65 mg by mouth daily, Starting Tue 3/2/2021, Until Thu 6/24/2021, Normal      sertraline (ZOLOFT) 50 mg tablet Take 1 tablet (50 mg total) by mouth daily, Starting Thu 7/16/2020, Until Thu 6/24/2021, Normal      tamsulosin (FLOMAX) 0 4 mg Take 1 capsule (0 4 mg total) by mouth daily at bedtime, Starting Wed 1/6/2021, Until Thu 6/24/2021, No Print      valACYclovir (VALTREX) 1,000 mg tablet Take 1 tablet (1,000 mg total) by mouth 2 (two) times a day for 7 days, Starting Wed 4/28/2021, Until Thu 6/24/2021, Normal No discharge procedures on file      PDMP Review       Value Time User    PDMP Reviewed  Yes 7/16/2020  8:50 AM Gloria Dozier MD          ED Provider  Electronically Signed by           Nely Delvalle PA-C  08/05/21 2789

## 2021-07-30 NOTE — ED CARE HANDOFF
Emergency Department Sign Out Note        Sign out and transfer of care from [facility]  See Separate Emergency Department note  The patient, Yanet Perales, was evaluated by the previous provider for left foot and ankle pain and swelling since last night  Workup Completed:  Left ankle xray    ED Course / Workup Pending (followup):  Duplex left ankle      7704   Per sign out, if US is negative, patient can follow up with pcp  1002    Per ultrasound technician, patient is negative for DVT  1020    Informed patient of US findings  Will discharge with outpatient follow up  Patient agreeable  The management plan was discussed in detail with the patient at bedside and all questions were answered  Prior to discharge, we provided both verbal and written instructions  We discussed with the patient the signs and symptoms for which to return to the emergency department  All questions were answered and patient was comfortable with the plan of care and discharged to home  Instructed the patient to follow up with the primary care provider and/or specialist provided and their written instructions  The patient verbalized understanding of our discussion and plan of care, and agrees to return to the Emergency Department for concerns and progression of illness  At discharge, I instructed the patient to:  -follow up with pcp  -take Tylenol for pain  -follow up with the recommended specialist  -rest and elevate your foot  -return to the ER if symptoms worsened or new symptoms arose  Patient agreed to this plan and was stable at time of discharge  ED Course as of Jul 30 1139   Fri Jul 30, 2021   2574 Per sign out, if US is negative, patient can follow up with pcp  1002 Per ultrasound technician, patient is negative for DVT          Procedures  MDM  Number of Diagnoses or Management Options  Left foot pain: new and requires workup     Amount and/or Complexity of Data Reviewed  Tests in the radiology section of CPT®: ordered and reviewed  Review and summarize past medical records: yes  Discuss the patient with other providers: yes    Patient Progress  Patient progress: stable      Disposition  Final diagnoses:   Left foot pain     Time reflects when diagnosis was documented in both MDM as applicable and the Disposition within this note     Time User Action Codes Description Comment    7/30/2021 10:32 AM Isidrokimberly Garcia Add [F49 [id number]] Left foot pain       ED Disposition     ED Disposition Condition Date/Time Comment    Discharge Stable Fri Jul 30, 2021 10:32 AM [facility] discharge to home/self care  Follow-up Information     Follow up With Specialties Details Why [address] [facility] Schedule an appointment as soon as possible for a visit in 1 day  [address]  [facility] [address] [facility] Orthopedic Surgery Schedule an appointment as soon as possible for a visit  As needed [address] [facility]  [place] [address] [facility] [id number]  [address] [place] [address], [address] [facility], [address] [place], [place], [id number] [phone]        Discharge Medication List as of 7/30/2021 10:33 AM      CONTINUE these medications which have NOT CHANGED    Details   acetaminophen (TYLENOL) 325 mg tablet Take 650 mg by mouth every 6 (six) hours as needed for mild pain, Historical Med      ALPRAZolam (XANAX) 0 25 mg tablet At twice a day as needed for anxiety, Normal      Azelastine HCl 0 15 % SOLN Inhale 1 spray 2 (two) times a day, Starting Thu 5/14/2020, Normal      Bimatoprost (LUMIGAN OP) Apply to eye , Historical Med      bismuth subsalicylate (PEPTO BISMOL) 262 MG chewable tablet Chew 524 mg daily as needed for indigestion  , Historical Med      calcitriol (ROCALTROL) 0 25 mcg capsule Take 1 capsule (0 25 mcg total) by mouth daily, Starting Fri 5/7/2021, Normal      docusate sodium (COLACE) 100 mg capsule Take 1 capsule (100 mg total) by mouth 2 (two) times a day, Starting Wed 1/6/2021, No Print      DULoxetine (CYMBALTA) 20 mg capsule Take 1 capsule (20 mg total) by mouth daily, Starting Tue 4/20/2021, Normal      ezetimibe (ZETIA) 10 mg tablet 1 tablet, Starting Fri 1/22/2021, Historical Med      fluocinonide (LIDEX) 0 05 % cream Apply topically 2 (two) times a day, Starting Tue 5/18/2021, Normal      fluticasone (FLONASE) 50 mcg/act nasal spray One spray in each nostril twice a day, Normal      furosemide (LASIX) 20 mg tablet TAKE 1 TABLET BY MOUTH AS NEEDED FOR EDEMA, Normal      gabapentin (NEURONTIN) 300 mg capsule Take 1 capsule (300 mg total) by mouth daily at bedtime, Starting Fri 7/23/2021, Until Mon 7/18/2022, Normal      gabapentin (NEURONTIN) 600 MG tablet Take 1 tablet (600 mg total) by mouth 3 (three) times a day, Starting Fri 7/23/2021, Until Thu 10/21/2021, Normal      Insulin Pen Needle 31G X 8 MM MISC Inject 3 times a day, Normal      isosorbide mononitrate (IMDUR) 30 mg 24 hr tablet Take 30 mg by mouth daily, Historical Med      Lantus SoloStar 100 units/mL injection pen 18 units qhs, Normal      latanoprost (XALATAN) 0 005 % ophthalmic solution Administer 1 drop to both eyes daily at bedtime, Historical Med      loperamide (IMODIUM) 2 mg capsule Take 2 mg by mouth 4 (four) times a day as needed for diarrhea, Historical Med      metoprolol succinate (TOPROL-XL) 100 mg 24 hr tablet TAKE 1 TABLET(100 MG) BY MOUTH DAILY, Normal      Mirabegron ER (Myrbetriq) 50 MG TB24 Take 1 tablet (50 mg total) by mouth daily, Starting Fri 5/7/2021, Normal      multivitamin (THERAGRAN) TABS Take 1 tablet by mouth daily  , Historical Med      NovoLOG FlexPen 100 units/mL injection pen 10 units with each meal , Normal      omeprazole (PriLOSEC) 20 mg delayed release capsule Take 20 mg by mouth daily, Historical Med      OXYCODONE HCL ER PO Take by mouth, Historical Med      pantoprazole (PROTONIX) 40 mg tablet Take 1 tablet (40 mg total) by mouth daily in the early morning, Starting Fri 7/23/2021, Until Thu 10/21/2021, No Print      amoxicillin (AMOXIL) 500 mg capsule Starting Wed 3/10/2021, Historical Med      aspirin 81 mg chewable tablet Chew 1 tablet (81 mg total) daily, Starting Thu 1/7/2021, Until Fri 7/23/2021, No Print      Ferrous Sulfate Dried (Feosol) 200 (65 Fe) MG TABS Take 65 mg by mouth daily, Starting Tue 3/2/2021, Until Thu 6/24/2021, Normal      sertraline (ZOLOFT) 50 mg tablet Take 1 tablet (50 mg total) by mouth daily, Starting Thu 7/16/2020, Until Thu 6/24/2021, Normal      tamsulosin (FLOMAX) 0 4 mg Take 1 capsule (0 4 mg total) by mouth daily at bedtime, Starting Wed 1/6/2021, Until Thu 6/24/2021, No Print      valACYclovir (VALTREX) 1,000 mg tablet Take 1 tablet (1,000 mg total) by mouth 2 (two) times a day for 7 days, Starting Wed 4/28/2021, Until Thu 6/24/2021, Normal           No discharge procedures on file         ED Provider  Electronically Signed by     Jorge Meeks PA-C  07/30/21 3034

## 2021-07-30 NOTE — DISCHARGE INSTRUCTIONS
DISCHARGE INSTRUCTIONS:    FOLLOW UP WITH YOUR PRIMARY CARE PROVIDER OR THE [address] [place] [address]  MAKE AN APPOINTMENT TO BE SEEN  TAKE TYLENOL FOR PAIN  FOLLOW UP WITH THE RECOMMENDED ORTHOPEDIC SPECIALIST    REST AND ELEVATE YOUR FEET  IF SYMPTOMS WORSEN OR NEW SYMPTOMS ARISE, RETURN TO THE ER TO BE SEEN

## 2021-08-04 ENCOUNTER — TELEPHONE (OUTPATIENT)
Dept: INTERNAL MEDICINE CLINIC | Facility: CLINIC | Age: 76
End: 2021-08-04

## 2021-08-04 DIAGNOSIS — N30.90 CYSTITIS: Primary | ICD-10-CM

## 2021-08-04 RX ORDER — CIPROFLOXACIN 250 MG/1
250 TABLET, FILM COATED ORAL DAILY
Qty: 5 TABLET | Refills: 0 | Status: SHIPPED | OUTPATIENT
Start: 2021-08-04 | End: 2021-08-09

## 2021-08-04 NOTE — TELEPHONE ENCOUNTER
----- Message from Malou Kraft MD sent at 7/28/2021  4:04 PM EDT -----  Please call the patient regarding his abnormal result   Pyuria if he has symptoms of UTI start Cipro 250 mg daily for 5 days renally adjusted otherwise no treatment necessary and he is not allergic to Cipro otherwise no treatment necessary

## 2021-08-04 NOTE — TELEPHONE ENCOUNTER
Patient managed by Dr Mercedez Pisano is calling to say he thinks he has infection  He is having burning and difficulty urinating  He stated he want to get a cysto not just an office appointment with PA  He stated he gets in done every 3 months  Please advise

## 2021-08-04 NOTE — TELEPHONE ENCOUNTER
Patient when to PCP hs urine testing completed on 7/28/21  PCP called patient back they are going to treat patient for uti  Patient has not started medication yet  Patient wanted to let Dr Kaci Otto know   He has several questions about the bladder ca and it came back?  Will send encounter to provider

## 2021-08-04 NOTE — TELEPHONE ENCOUNTER
----- Message from Jt Shelley MD sent at 7/28/2021  4:04 PM EDT -----  Please call the patient regarding his abnormal result   Pyuria if he has symptoms of UTI start Cipro 250 mg daily for 5 days renally adjusted otherwise no treatment necessary and he is not allergic to Cipro otherwise no treatment necessary

## 2021-08-04 NOTE — TELEPHONE ENCOUNTER
Call placed to patient  He did not answer  LMOM for him to contact the office to further assess his symptoms at this time  Office number was provided for the patient to call back to discuss

## 2021-08-04 NOTE — TELEPHONE ENCOUNTER
----- Message from Jt Shelley MD sent at 7/28/2021  4:17 PM EDT -----  Please call the patient regarding his abnormal result  Diabetes very controlled hemoglobin A1c improved renal function deteriorated send copy to the nephrologist  Glucose was 57 he was hypoglycemic when he went to get the lab work done send copies to the endocrinologist to adjust the diabetic medications and tell the patient to check his blood sugars at least before meals and bedtime and watch for hypoglycemia if he feels weak or that he is going to pass out to check his blood sugar immediately and ask  Endocrine to adjust the diabetic medication

## 2021-08-04 NOTE — TELEPHONE ENCOUNTER
I spoke with Daly Medina and gave result of UA   He said he was still having symptoms  Rx sent to [facility] for Cipro  I also spoke with him about his blood sugar and asked him to check it before meals    I told him I was going to forward this to his endocrinologist

## 2021-08-09 NOTE — TELEPHONE ENCOUNTER
I spoke to patient and he would like to have a repeat cysto  I ordered 2 urine cytologies  Please call pt and schedule for an office cysto - can be in about 6 weeks

## 2021-08-10 ENCOUNTER — APPOINTMENT (OUTPATIENT)
Dept: LAB | Facility: CLINIC | Age: 76
End: 2021-08-10
Payer: MEDICARE

## 2021-08-10 DIAGNOSIS — R19.7 DIARRHEA, UNSPECIFIED TYPE: ICD-10-CM

## 2021-08-10 PROCEDURE — 87493 C DIFF AMPLIFIED PROBE: CPT

## 2021-08-11 LAB — C DIFF TOX GENS STL QL NAA+PROBE: NEGATIVE

## 2021-08-11 NOTE — TELEPHONE ENCOUNTER
Call placed to patient and spoke with him   Scheduled office cystoscopy with Dr Veronique Villar on 9- at [address] [place] [address]

## 2021-08-11 NOTE — TELEPHONE ENCOUNTER
There are no open cysto spots in 6-7 weeks  Could I put with Dr Marianne Vega on 9/23 or 9/24 and book in 1/2 slot?

## 2021-08-12 DIAGNOSIS — R09.89 LEFT CAROTID BRUIT: ICD-10-CM

## 2021-08-12 RX ORDER — METOPROLOL SUCCINATE 100 MG/1
TABLET, EXTENDED RELEASE ORAL
Qty: 30 TABLET | Refills: 0 | Status: SHIPPED | OUTPATIENT
Start: 2021-08-12 | End: 2021-09-13

## 2021-08-28 ENCOUNTER — IMMUNIZATIONS (OUTPATIENT)
Dept: FAMILY MEDICINE CLINIC | Facility: MEDICAL CENTER | Age: 76
End: 2021-08-28

## 2021-08-28 DIAGNOSIS — Z23 ENCOUNTER FOR IMMUNIZATION: Primary | ICD-10-CM

## 2021-08-28 PROCEDURE — 91300 SARSCOV2 VAC 30MCG/0.3ML IM: CPT

## 2021-08-30 DIAGNOSIS — M47.816 LUMBAR SPONDYLOSIS: ICD-10-CM

## 2021-08-30 DIAGNOSIS — M54.16 LUMBAR RADICULOPATHY: ICD-10-CM

## 2021-08-30 DIAGNOSIS — F41.9 ANXIETY: ICD-10-CM

## 2021-08-30 DIAGNOSIS — E55.9 VITAMIN D DEFICIENCY: ICD-10-CM

## 2021-08-30 DIAGNOSIS — M51.36 DEGENERATION OF LUMBAR INTERVERTEBRAL DISC: ICD-10-CM

## 2021-09-01 RX ORDER — NALOXONE HYDROCHLORIDE 4 MG/.1ML
SPRAY NASAL
Qty: 1 EACH | Refills: 1 | Status: SHIPPED | OUTPATIENT
Start: 2021-09-01

## 2021-09-01 RX ORDER — DULOXETIN HYDROCHLORIDE 20 MG/1
20 CAPSULE, DELAYED RELEASE ORAL DAILY
Qty: 30 CAPSULE | Refills: 1 | Status: SHIPPED | OUTPATIENT
Start: 2021-09-01 | End: 2021-09-29 | Stop reason: SDUPTHER

## 2021-09-01 RX ORDER — CALCITRIOL 0.25 UG/1
0.25 CAPSULE, LIQUID FILLED ORAL DAILY
Qty: 30 CAPSULE | Refills: 1 | Status: SHIPPED | OUTPATIENT
Start: 2021-09-01 | End: 2021-09-29 | Stop reason: SDUPTHER

## 2021-09-01 RX ORDER — ALPRAZOLAM 0.25 MG/1
TABLET ORAL
Qty: 30 TABLET | Refills: 0 | Status: SHIPPED | OUTPATIENT
Start: 2021-09-01 | End: 2021-09-29 | Stop reason: SDUPTHER

## 2021-09-03 ENCOUNTER — APPOINTMENT (OUTPATIENT)
Dept: LAB | Facility: CLINIC | Age: 76
End: 2021-09-03
Payer: MEDICARE

## 2021-09-03 DIAGNOSIS — E11.8 TYPE 2 DIABETES MELLITUS WITH COMPLICATION, WITH LONG-TERM CURRENT USE OF INSULIN (HCC): ICD-10-CM

## 2021-09-03 DIAGNOSIS — Z79.4 TYPE 2 DIABETES MELLITUS WITH COMPLICATION, WITH LONG-TERM CURRENT USE OF INSULIN (HCC): ICD-10-CM

## 2021-09-03 LAB
ALBUMIN SERPL BCP-MCNC: 3.5 G/DL (ref 3.5–5)
ALP SERPL-CCNC: 109 U/L (ref 46–116)
ALT SERPL W P-5'-P-CCNC: 54 U/L (ref 12–78)
ANION GAP SERPL CALCULATED.3IONS-SCNC: 8 MMOL/L (ref 4–13)
AST SERPL W P-5'-P-CCNC: 26 U/L (ref 5–45)
BILIRUB SERPL-MCNC: 0.5 MG/DL (ref 0.2–1)
BUN SERPL-MCNC: 32 MG/DL (ref 5–25)
CALCIUM SERPL-MCNC: 8.6 MG/DL (ref 8.3–10.1)
CHLORIDE SERPL-SCNC: 106 MMOL/L (ref 100–108)
CO2 SERPL-SCNC: 24 MMOL/L (ref 21–32)
CREAT SERPL-MCNC: 2.68 MG/DL (ref 0.6–1.3)
EST. AVERAGE GLUCOSE BLD GHB EST-MCNC: 174 MG/DL
GFR SERPL CREATININE-BSD FRML MDRD: 25 ML/MIN/1.73SQ M
GLUCOSE P FAST SERPL-MCNC: 134 MG/DL (ref 65–99)
HBA1C MFR BLD: 7.7 %
POTASSIUM SERPL-SCNC: 4.8 MMOL/L (ref 3.5–5.3)
PROT SERPL-MCNC: 7.4 G/DL (ref 6.4–8.2)
SODIUM SERPL-SCNC: 138 MMOL/L (ref 136–145)

## 2021-09-03 PROCEDURE — 80053 COMPREHEN METABOLIC PANEL: CPT

## 2021-09-03 PROCEDURE — 36415 COLL VENOUS BLD VENIPUNCTURE: CPT

## 2021-09-03 PROCEDURE — 83036 HEMOGLOBIN GLYCOSYLATED A1C: CPT

## 2021-09-07 ENCOUNTER — TELEPHONE (OUTPATIENT)
Dept: OTHER | Facility: OTHER | Age: 76
End: 2021-09-07

## 2021-09-07 NOTE — TELEPHONE ENCOUNTER
Clinical pool: Patient is scheduled for a procedure on 9/29/2021 with Dr Yaritza Cassidy  He has been having hematuria for 3 days  He wants to know if the procedure can be done in the hospital and sooner  Please advise

## 2021-09-07 NOTE — TELEPHONE ENCOUNTER
Returned call to patient   Patient states started with blood in his urine started 3 days  Ago  Dark red ( wine color) with clots  Patient states hydrating with 6-7 bottles of water a day  No change in urine   Today per patient there are "alot  more clots that the past three days and my urine is dark wine colored"     Advised patient to go to Ed  Notified AP  Via tiger text on call in [place] [address]

## 2021-09-09 ENCOUNTER — TELEPHONE (OUTPATIENT)
Dept: UROLOGY | Facility: CLINIC | Age: 76
End: 2021-09-09

## 2021-09-09 NOTE — TELEPHONE ENCOUNTER
Forwarding to 
L/m on v/m for call center that he would like to speak to the    No other details given    Please call [phone]
normal

## 2021-09-10 ENCOUNTER — TELEPHONE (OUTPATIENT)
Dept: UROLOGY | Facility: MEDICAL CENTER | Age: 76
End: 2021-09-10

## 2021-09-10 NOTE — TELEPHONE ENCOUNTER
Contacted and spoke with patient per Rajwinder's request  Patient states he has been experiencing intermittent hematuria for several days now  Patient with history bladder cancer and BPH  Patient proceeded to the ER on 09/07/2021 secondary to his hematuria but left because there were too may people  Patient reports the hematuria can be pink tinged to bloody with clots  Patient expresses concern the bleeding persists and his cystoscopy isn't until the end of September  I offered 09/15/2021 at 1130 with Dr Woody Current and patient wishes to take that appointment instead of waiting  Patient appreciative of the earlier appointment

## 2021-09-10 NOTE — TELEPHONE ENCOUNTER
Patient would like a nurse to contact him regarding his visit to the ED a few days ago  Please reach out  Thank you

## 2021-09-11 DIAGNOSIS — R09.89 LEFT CAROTID BRUIT: ICD-10-CM

## 2021-09-13 RX ORDER — METOPROLOL SUCCINATE 100 MG/1
TABLET, EXTENDED RELEASE ORAL
Qty: 30 TABLET | Refills: 0 | Status: SHIPPED | OUTPATIENT
Start: 2021-09-13 | End: 2021-12-10 | Stop reason: SDUPTHER

## 2021-09-15 ENCOUNTER — PROCEDURE VISIT (OUTPATIENT)
Dept: UROLOGY | Facility: MEDICAL CENTER | Age: 76
End: 2021-09-15
Payer: MEDICARE

## 2021-09-15 VITALS
WEIGHT: 237 LBS | BODY MASS INDEX: 29.47 KG/M2 | DIASTOLIC BLOOD PRESSURE: 80 MMHG | HEIGHT: 75 IN | SYSTOLIC BLOOD PRESSURE: 132 MMHG

## 2021-09-15 DIAGNOSIS — R31.0 GROSS HEMATURIA: Primary | ICD-10-CM

## 2021-09-15 DIAGNOSIS — Z85.51 HISTORY OF BLADDER CANCER: ICD-10-CM

## 2021-09-15 PROCEDURE — 52000 CYSTOURETHROSCOPY: CPT | Performed by: UROLOGY

## 2021-09-15 PROCEDURE — 99214 OFFICE O/P EST MOD 30 MIN: CPT | Performed by: UROLOGY

## 2021-09-15 NOTE — PROGRESS NOTES
HISTORY:    Episodic gross hematuria with clots over the past several weeks  Urgency frequency nocturia times 3-5  History of bladder cancer and CIS over the years, last definite biopsy in 2016  Last bladder biopsy November 2020 was negative   Cysto clot evacuation cautery of bladder bleeding in February 2021  ASSESSMENT / PLAN:    Cysto today shows several areas of erythema and some polypoid inflammation left lateral wall  Due to renal insufficiency, has not had a contrast study of upper tract any time recently     I recommend cysto, bladder biopsy, upper tract cytology collection, possible upper tract ureteroscopy  Potential complications discussed, he consents and we will schedule  The following portions of the patient's history were reviewed and updated as appropriate: allergies, current medications, past family history, past medical history, past social history, past surgical history and problem list     Review of Systems   All other systems reviewed and are negative  Objective:     Physical Exam  Constitutional:       General: He is not in acute distress  Appearance: He is well-developed  He is not diaphoretic  HENT:      Head: Normocephalic and atraumatic  Eyes:      General: No scleral icterus  Pulmonary:      Effort: Pulmonary effort is normal    Skin:     Coloration: Skin is not pale  Neurological:      Mental Status: He is alert and oriented to person, place, and time  Psychiatric:         Behavior: Behavior normal          Thought Content: Thought content normal          Judgment: Judgment normal               Cystoscopy     Date/Time 9/15/2021 11:30 AM     Performed by  Jenelle Vasquez MD     Authorized by Jenelle Vasquez MD          Procedure Details:  Procedure type: cystoscopy    Patient tolerance: Patient tolerated the procedure well with no immediate complications    Additional Procedure Details:      Patient presents for cystoscopy    I have discussed the reasons for doing the test, and the potential risks and complications  Patient expressed understanding, and signed informed consent document  The patient was carefully  positioned supine on the examining table  Sterile preparation was performed on the urethra  Xylocaine jelly was instilled and left  Indwelling for the procedure  The 13 [place] flexible cystoscope was passed with the following findings:      Urethra:  No stricture    Prostate:  lateral lobes significant lateral lobe hyperplasia                   Bladder:  Several discrete areas of erythema, 3-10 mm in size  Left bladder wall has polypoid lesions as well  Residual urine:  Minimal    Patient tolerated the procedure well and was escorted from the examining table      No results found for: PSA]  BUN   Date Value Ref Range Status   09/03/2021 32 (H) 5 - 25 mg/dL Final   09/03/2015 19 5 - 25 mg/dL Final     Creatinine   Date Value Ref Range Status   09/03/2021 2 68 (H) 0 60 - 1 30 mg/dL Final     Comment:     Standardized to IDMS reference method   09/03/2015 1 37 (H) 0 60 - 1 30 mg/dL Final     Comment:     Standardized to IDMS reference method     No components found for: CBC      Patient Active Problem List   Diagnosis    CAD (coronary artery disease)    Carcinoma in situ of bladder    Hypertension with renal disease    Hyperlipidemia    Presence of stent in coronary artery    Type 2 diabetes mellitus, with long-term current use of insulin (HCC)    CKD (chronic kidney disease) stage 3, GFR 30-59 ml/min ([facility])    Primary osteoarthritis of left knee    Malignant tumor of urinary bladder (HCC)    Cystitis due to intravesical BCG administration    Degeneration of lumbar intervertebral disc    Back pain    Arteriosclerosis of artery of extremity ([address]    Stable angina pectoris ([address] )    Herpes zoster without complication    Localized edema    Superficial phlebitis    Preop examination    Acute cystitis without hematuria    Secondary renal hyperparathyroidism (HCC)    Bladder cancer (HCC)    Abnormal MRI, lumbar spine    Diabetic polyneuropathy associated with type 2 diabetes mellitus ([facility]    Dysuria    Diabetic ulcer of left foot associated with type 2 diabetes mellitus, 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)    CKD (chronic kidney disease) stage 4, GFR 15-29 ml/min (HCC)    Chronic anemia    Anemia of chronic disease    Preoperative examination    PAD (peripheral artery disease) (HCC)    Left carotid bruit    Gross hematuria    Chronic bronchitis (HCC)    Moderate major depression, single episode (HCC)    Thrombocytopenia (HCC)    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    Lumbar spondylosis    Chronic pain syndrome        There are no diagnoses linked to this encounter  Patient ID: Yanet Perales is a 64 y o  male  Current Outpatient Medications:     acetaminophen (TYLENOL) 325 mg tablet, Take 650 mg by mouth every 6 (six) hours as needed for mild pain, Disp: , Rfl:     ALPRAZolam (XANAX) 0 25 mg tablet, At twice a day as needed for anxiety, Disp: 30 tablet, Rfl: 0    aspirin 81 mg chewable tablet, Chew 1 tablet (81 mg total) daily, Disp: 30 tablet, Rfl: 0    Azelastine HCl 0 15 % SOLN, Inhale 1 spray 2 (two) times a day (Patient taking differently: Inhale 1 spray 2 (two) times a day as needed ), Disp: 30 mL, Rfl: 3    Bimatoprost (LUMIGAN OP), Apply to eye , Disp: , Rfl:     bismuth subsalicylate (PEPTO BISMOL) 262 MG chewable tablet, Chew 524 mg daily as needed for indigestion  , Disp: , Rfl:     calcitriol (ROCALTROL) 0 25 mcg capsule, Take 1 capsule (0 25 mcg total) by mouth daily, Disp: 30 capsule, Rfl: 1    docusate sodium (COLACE) 100 mg capsule, Take 1 capsule (100 mg total) by mouth 2 (two) times a day, Disp: 10 capsule, Rfl: 0    DULoxetine (CYMBALTA) 20 mg capsule, Take 1 capsule (20 mg total) by mouth daily, Disp: 30 capsule, Rfl: 1    ezetimibe (ZETIA) 10 mg tablet, 1 tablet, Disp: , Rfl:     fluocinonide (LIDEX) 0 05 % cream, Apply topically 2 (two) times a day, Disp: 30 g, Rfl: 0    fluticasone (FLONASE) 50 mcg/act nasal spray, One spray in each nostril twice a day, Disp: 1 Bottle, Rfl: 3    furosemide (LASIX) 20 mg tablet, TAKE 1 TABLET BY MOUTH AS NEEDED FOR EDEMA, Disp: 90 tablet, Rfl: 1    gabapentin (NEURONTIN) 300 mg capsule, Take 1 capsule (300 mg total) by mouth daily at bedtime, Disp: 90 capsule, Rfl: 3    gabapentin (NEURONTIN) 600 MG tablet, Take 1 tablet (600 mg total) by mouth 3 (three) times a day, Disp: 90 tablet, Rfl: 2    Insulin Pen Needle 31G X 8 MM MISC, Inject 3 times a day, Disp: 100 each, Rfl: 2    isosorbide mononitrate (IMDUR) 30 mg 24 hr tablet, Take 3 tablets (90 mg total) by mouth daily (Patient taking differently: Take 30 mg by mouth daily ), Disp: 90 tablet, Rfl: 0    isosorbide mononitrate (IMDUR) 30 mg 24 hr tablet, Take 30 mg by mouth daily, Disp: , Rfl:     Lantus SoloStar 100 units/mL injection pen, 18 units qhs, Disp: 30 mL, Rfl: 1    latanoprost (XALATAN) 0 005 % ophthalmic solution, Administer 1 drop to both eyes daily at bedtime, Disp: , Rfl:     loperamide (IMODIUM) 2 mg capsule, Take 2 mg by mouth 4 (four) times a day as needed for diarrhea, Disp: , Rfl:     metoprolol succinate (TOPROL-XL) 100 mg 24 hr tablet, TAKE 1 TABLET(100 MG) BY MOUTH DAILY, Disp: 30 tablet, Rfl: 0    multivitamin (THERAGRAN) TABS, Take 1 tablet by mouth daily  , Disp: , Rfl:     naloxone (NARCAN) 4 mg/0 1 mL nasal spray, Administer 1 spray into a nostril   If no response after 2-3 minutes, give another dose in the other nostril using a new spray , Disp: 1 each, Rfl: 1    NovoLOG FlexPen 100 units/mL injection pen, 10 units with each meal , Disp: 5 pen, Rfl: 1    omeprazole (PriLOSEC) 20 mg delayed release capsule, Take 20 mg by mouth daily, Disp: , Rfl:     OXYCODONE HCL ER PO, Take by mouth, Disp: , Rfl:     pantoprazole (PROTONIX) 40 mg tablet, Take 1 tablet (40 mg total) by mouth daily in the early morning, Disp: 90 tablet, Rfl: 1    sertraline (ZOLOFT) 50 mg tablet, Take 1 tablet (50 mg total) by mouth daily, Disp: 30 tablet, Rfl: 3    tamsulosin (FLOMAX) 0 4 mg, Take 1 capsule (0 4 mg total) by mouth daily at bedtime, Disp: 30 capsule, Rfl: 0    amoxicillin (AMOXIL) 500 mg capsule, , Disp: , Rfl:     Ferrous Sulfate Dried (Feosol) 200 (65 Fe) MG TABS, Take 65 mg by mouth daily, Disp: 30 each, Rfl: 1    Mirabegron ER (Myrbetriq) 50 MG TB24, Take 1 tablet (50 mg total) by mouth daily (Patient not taking: Reported on 9/15/2021), Disp: 30 tablet, Rfl: 1    valACYclovir (VALTREX) 1,000 mg tablet, Take 1 tablet (1,000 mg total) by mouth 2 (two) times a day for 7 days, Disp: 14 tablet, Rfl: 0    Past Medical History:   Diagnosis Date    Anemia     Last assessed: 9/28/17    Anxiety     Arteriosclerotic cardiovascular disease     Last assessed: 9/28/17    Arthritis     Bladder cancer ([facility] )     bladder- had BCG treatments    Chronic kidney disease     CKD (chronic kidney disease) stage 4, GFR 15-29 ml/min ([facility])     Colon polyp     Coronary artery disease     7 stents    Depression     Diabetes mellitus ([facility])     IDDM    GERD (gastroesophageal reflux disease)     Glaucoma     History of fusion of cervical spine     Hyperlipidemia     Hypertension     Insomnia     Last assessed: 11/14/12    Loss of hearing     has hearing aids but usually does not wear them    Other seasonal allergic rhinitis     Last assessed: 2/10/16    PAD (peripheral artery disease) ([facility])     Shortness of breath     on exertion    Spinal stenosis of lumbar region     Transient cerebral ischemia     Uses walker     w/c for longer distances       Past Surgical History:   Procedure Laterality Date    CARDIAC SURGERY      Cath stent placement  Last assessed: 3/9/17  Interventional Catheterization    CHOLECYSTECTOMY      COLONOSCOPY      CYSTOSCOPY      Diagnostic w/biopsy  Pino Gibson  Last assessed: 12/1/14    CYSTOURETHROSCOPY      w/cautery  Pino Gibson    GASTRIC BYPASS      For morbid obesity w/Shaji-en-Y   Resolved: 11/17/09    INCISION AND DRAINAGE OF WOUND Right 2/26/2017    Procedure: INCISION AND DRAINAGE (I&D) EXTREMITY WITH APPLICATION OF GRAFT JACKET;  Surgeon: Michel Lakhani DPM;  [facility];  Service:     INCISION AND DRAINAGE OF WOUND Right 4/25/2017    Procedure: INCISION AND DRAINAGE (I&D) EXTREMITY, APPLICATION OF GRAFT;  Surgeon: Michel Lakhani DPM;  [facility];  Service:     IR BIOPSY OTHER  7/2/2020    IR LOWER EXTREMITY ANGIOGRAM  2/8/2021    IR LOWER EXTREMITY ANGIOGRAM  2/11/2021    IR TUNNELED CENTRAL LINE PLACEMENT  12/24/2020    JOINT REPLACEMENT      christofer knees replaced    MI AMPUTATION METATARSAL+TOE,SINGLE Left 12/21/2020    Procedure: RAY RESECTION FOOT;  Surgeon: Michelle Grove DPM;  [facility];  Service: Podiatry    MI AMPUTATION METATARSAL+TOE,SINGLE Left 12/31/2020    Procedure: 5TH MET RESECTION;  Surgeon: Michelle Grove DPM;  [facility];  Service: Podiatry    MI CYSTOURETHROSCOPY W/IRRIG & EVAC CLOTS N/A 2/10/2021    Procedure: CYSTOSCOPY EVACUATION OF CLOTS, fulguration;  Surgeon: Asia Garcia MD;  [facility];  Service: Urology    MI CYSTOURETHROSCOPY,BIOPSY N/A 8/16/2016    Procedure: CYSTOSCOPY WITH BIOPSIES;  Surgeon: Favian Monreal MD;  [facility];  Service: Urology    MI CYSTOURETHROSCOPY,FULGUR <0 5 CM LESN N/A 11/19/2020    Procedure: CYSTO W/BIOPSIES, transurethral prostate bx;  Surgeon: Javier Gonzales MD;  [facility];  Service: Urology    MI [address] [facility] Left 2/8/2021    Procedure: LEG angiogram, CO2 w/limited contrast with balloon angioplasty postertior tibial artery;  Surgeon: Germán Garibay MD;  [facility];  Service: Vascular    ROTATOR CUFF REPAIR     [address] [facility]      Surgery Shaji-en-Y    SPINAL FUSION      lumbar and cervical fusions    VAC DRESSING APPLICATION Right 7/14/9785    Procedure: APPLICATION VAC DRESSING;  Surgeon: Ana Ugalde DPM;  [facility];  Service:    [address] [place] [address] Left 2/16/2021    Procedure: FOOT DEBRIDE, [address] OUT w/graft application;  Surgeon: Ana Ugalde DPM;  [facility];  Service: Podiatry       Social History

## 2021-09-24 ENCOUNTER — TELEPHONE (OUTPATIENT)
Dept: UROLOGY | Facility: MEDICAL CENTER | Age: 76
End: 2021-09-24

## 2021-09-29 ENCOUNTER — OFFICE VISIT (OUTPATIENT)
Dept: INTERNAL MEDICINE CLINIC | Facility: CLINIC | Age: 76
End: 2021-09-29
Payer: MEDICARE

## 2021-09-29 VITALS
RESPIRATION RATE: 12 BRPM | SYSTOLIC BLOOD PRESSURE: 120 MMHG | HEIGHT: 75 IN | WEIGHT: 237 LBS | TEMPERATURE: 97.7 F | DIASTOLIC BLOOD PRESSURE: 80 MMHG | BODY MASS INDEX: 29.47 KG/M2 | HEART RATE: 80 BPM

## 2021-09-29 DIAGNOSIS — M47.816 LUMBAR SPONDYLOSIS: ICD-10-CM

## 2021-09-29 DIAGNOSIS — N18.30 TYPE 2 DIABETES MELLITUS WITH STAGE 3 CHRONIC KIDNEY DISEASE, WITH LONG-TERM CURRENT USE OF INSULIN, UNSPECIFIED WHETHER STAGE 3A OR 3B CKD (HCC): Primary | ICD-10-CM

## 2021-09-29 DIAGNOSIS — E78.2 MIXED HYPERLIPIDEMIA: ICD-10-CM

## 2021-09-29 DIAGNOSIS — N18.4 CKD (CHRONIC KIDNEY DISEASE) STAGE 4, GFR 15-29 ML/MIN (HCC): ICD-10-CM

## 2021-09-29 DIAGNOSIS — E11.22 TYPE 2 DIABETES MELLITUS WITH STAGE 3 CHRONIC KIDNEY DISEASE, WITH LONG-TERM CURRENT USE OF INSULIN, UNSPECIFIED WHETHER STAGE 3A OR 3B CKD (HCC): Primary | ICD-10-CM

## 2021-09-29 DIAGNOSIS — I25.10 CORONARY ARTERY DISEASE INVOLVING NATIVE CORONARY ARTERY OF NATIVE HEART WITHOUT ANGINA PECTORIS: ICD-10-CM

## 2021-09-29 DIAGNOSIS — M54.16 LUMBAR RADICULOPATHY: ICD-10-CM

## 2021-09-29 DIAGNOSIS — J30.2 SEASONAL ALLERGIES: ICD-10-CM

## 2021-09-29 DIAGNOSIS — E55.9 VITAMIN D DEFICIENCY: ICD-10-CM

## 2021-09-29 DIAGNOSIS — D09.0 CARCINOMA IN SITU OF BLADDER: ICD-10-CM

## 2021-09-29 DIAGNOSIS — F32.1 MODERATE MAJOR DEPRESSION, SINGLE EPISODE (HCC): ICD-10-CM

## 2021-09-29 DIAGNOSIS — Z79.4 TYPE 2 DIABETES MELLITUS WITH STAGE 3 CHRONIC KIDNEY DISEASE, WITH LONG-TERM CURRENT USE OF INSULIN, UNSPECIFIED WHETHER STAGE 3A OR 3B CKD (HCC): Primary | ICD-10-CM

## 2021-09-29 DIAGNOSIS — M51.36 DEGENERATION OF LUMBAR INTERVERTEBRAL DISC: ICD-10-CM

## 2021-09-29 DIAGNOSIS — E11.42 DIABETIC POLYNEUROPATHY ASSOCIATED WITH TYPE 2 DIABETES MELLITUS (HCC): ICD-10-CM

## 2021-09-29 DIAGNOSIS — Z23 ENCOUNTER FOR IMMUNIZATION: ICD-10-CM

## 2021-09-29 DIAGNOSIS — F41.9 ANXIETY: ICD-10-CM

## 2021-09-29 DIAGNOSIS — I12.9 HYPERTENSION WITH RENAL DISEASE: ICD-10-CM

## 2021-09-29 PROCEDURE — 90662 IIV NO PRSV INCREASED AG IM: CPT | Performed by: INTERNAL MEDICINE

## 2021-09-29 PROCEDURE — 99214 OFFICE O/P EST MOD 30 MIN: CPT | Performed by: INTERNAL MEDICINE

## 2021-09-29 PROCEDURE — G0008 ADMIN INFLUENZA VIRUS VAC: HCPCS | Performed by: INTERNAL MEDICINE

## 2021-09-29 RX ORDER — ALPRAZOLAM 0.25 MG/1
TABLET ORAL
Qty: 30 TABLET | Refills: 0 | Status: CANCELLED | OUTPATIENT
Start: 2021-09-29

## 2021-09-29 RX ORDER — DULOXETIN HYDROCHLORIDE 20 MG/1
20 CAPSULE, DELAYED RELEASE ORAL DAILY
Qty: 30 CAPSULE | Refills: 1 | Status: SHIPPED | OUTPATIENT
Start: 2021-09-29 | End: 2022-01-03

## 2021-09-29 RX ORDER — CALCITRIOL 0.25 UG/1
0.25 CAPSULE, LIQUID FILLED ORAL DAILY
Qty: 30 CAPSULE | Refills: 1 | Status: SHIPPED | OUTPATIENT
Start: 2021-09-29 | End: 2022-02-02 | Stop reason: SDUPTHER

## 2021-09-29 RX ORDER — FLUTICASONE PROPIONATE 50 MCG
1 SPRAY, SUSPENSION (ML) NASAL DAILY
Qty: 11 ML | Refills: 1 | Status: SHIPPED | OUTPATIENT
Start: 2021-09-29

## 2021-09-29 RX ORDER — ALPRAZOLAM 0.25 MG/1
TABLET ORAL
Qty: 30 TABLET | Refills: 0 | Status: SHIPPED | OUTPATIENT
Start: 2021-09-29 | End: 2022-05-17 | Stop reason: SDUPTHER

## 2021-09-29 NOTE — TELEPHONE ENCOUNTER
Clarified with Call center, PCP office called to ask us to call the patient      I spoke to the patient and scheduled his procedure with Dr Carlitos Deleon for 10/18/21 at [facility] with Dr Carlitos Deleon     -instructions given verbally and Emailed  -patient will stop his Aspirin 7 days prior  -CBC, CMP, Urine C&S and EKG 2 weeks prior  -[facility] - no auth required

## 2021-09-29 NOTE — PROGRESS NOTES
Assessment/Plan:    No problem-specific Assessment & Plan notes found for this encounter  Diagnoses and all orders for this visit:    Type 2 diabetes mellitus with stage 3 chronic kidney disease, with long-term current use of insulin, unspecified whether stage 3a or 3b CKD ([facility] )    Diabetic polyneuropathy associated with type 2 diabetes mellitus ([facility] )    Coronary artery disease involving native coronary artery of native heart without angina pectoris    Hypertension with renal disease    Carcinoma in situ of bladder    Mixed hyperlipidemia    Moderate major depression, single episode ([place] [address] )          Subjective:      Patient ID: Bradley Hanley is a 64 y o  male  No chief complaint on file  Current Outpatient Medications:     acetaminophen (TYLENOL) 325 mg tablet, Take 650 mg by mouth every 6 (six) hours as needed for mild pain, Disp: , Rfl:     ALPRAZolam (XANAX) 0 25 mg tablet, At twice a day as needed for anxiety, Disp: 30 tablet, Rfl: 0    amoxicillin (AMOXIL) 500 mg capsule, , Disp: , Rfl:     aspirin 81 mg chewable tablet, Chew 1 tablet (81 mg total) daily, Disp: 30 tablet, Rfl: 0    Azelastine HCl 0 15 % SOLN, Inhale 1 spray 2 (two) times a day (Patient taking differently: Inhale 1 spray 2 (two) times a day as needed ), Disp: 30 mL, Rfl: 3    Bimatoprost (LUMIGAN OP), Apply to eye , Disp: , Rfl:     bismuth subsalicylate (PEPTO BISMOL) 262 MG chewable tablet, Chew 524 mg daily as needed for indigestion  , Disp: , Rfl:     calcitriol (ROCALTROL) 0 25 mcg capsule, Take 1 capsule (0 25 mcg total) by mouth daily, Disp: 30 capsule, Rfl: 1    docusate sodium (COLACE) 100 mg capsule, Take 1 capsule (100 mg total) by mouth 2 (two) times a day, Disp: 10 capsule, Rfl: 0    DULoxetine (CYMBALTA) 20 mg capsule, Take 1 capsule (20 mg total) by mouth daily, Disp: 30 capsule, Rfl: 1    ezetimibe (ZETIA) 10 mg tablet, 1 tablet, Disp: , Rfl:     Ferrous Sulfate Dried (Feosol) 200 (65 Fe) MG TABS, Take 65 mg by mouth daily, Disp: 30 each, Rfl: 1    fluocinonide (LIDEX) 0 05 % cream, Apply topically 2 (two) times a day, Disp: 30 g, Rfl: 0    fluticasone (FLONASE) 50 mcg/act nasal spray, One spray in each nostril twice a day, Disp: 1 Bottle, Rfl: 3    furosemide (LASIX) 20 mg tablet, TAKE 1 TABLET BY MOUTH AS NEEDED FOR EDEMA, Disp: 90 tablet, Rfl: 1    gabapentin (NEURONTIN) 300 mg capsule, Take 1 capsule (300 mg total) by mouth daily at bedtime, Disp: 90 capsule, Rfl: 3    gabapentin (NEURONTIN) 600 MG tablet, Take 1 tablet (600 mg total) by mouth 3 (three) times a day, Disp: 90 tablet, Rfl: 2    Insulin Pen Needle 31G X 8 MM MISC, Inject 3 times a day, Disp: 100 each, Rfl: 2    isosorbide mononitrate (IMDUR) 30 mg 24 hr tablet, Take 3 tablets (90 mg total) by mouth daily (Patient taking differently: Take 30 mg by mouth daily ), Disp: 90 tablet, Rfl: 0    isosorbide mononitrate (IMDUR) 30 mg 24 hr tablet, Take 30 mg by mouth daily, Disp: , Rfl:     Lantus SoloStar 100 units/mL injection pen, 18 units qhs, Disp: 30 mL, Rfl: 1    latanoprost (XALATAN) 0 005 % ophthalmic solution, Administer 1 drop to both eyes daily at bedtime, Disp: , Rfl:     loperamide (IMODIUM) 2 mg capsule, Take 2 mg by mouth 4 (four) times a day as needed for diarrhea, Disp: , Rfl:     metoprolol succinate (TOPROL-XL) 100 mg 24 hr tablet, TAKE 1 TABLET(100 MG) BY MOUTH DAILY, Disp: 30 tablet, Rfl: 0    Mirabegron ER (Myrbetriq) 50 MG TB24, Take 1 tablet (50 mg total) by mouth daily (Patient not taking: Reported on 9/15/2021), Disp: 30 tablet, Rfl: 1    multivitamin (THERAGRAN) TABS, Take 1 tablet by mouth daily  , Disp: , Rfl:     naloxone (NARCAN) 4 mg/0 1 mL nasal spray, Administer 1 spray into a nostril   If no response after 2-3 minutes, give another dose in the other nostril using a new spray , Disp: 1 each, Rfl: 1    NovoLOG FlexPen 100 units/mL injection pen, 10 units with each meal , Disp: 5 pen, Rfl: 1    omeprazole (PriLOSEC) 20 mg delayed release capsule, Take 20 mg by mouth daily, Disp: , Rfl:     OXYCODONE HCL ER PO, Take by mouth, Disp: , Rfl:     pantoprazole (PROTONIX) 40 mg tablet, Take 1 tablet (40 mg total) by mouth daily in the early morning, Disp: 90 tablet, Rfl: 1    sertraline (ZOLOFT) 50 mg tablet, Take 1 tablet (50 mg total) by mouth daily, Disp: 30 tablet, Rfl: 3    tamsulosin (FLOMAX) 0 4 mg, Take 1 capsule (0 4 mg total) by mouth daily at bedtime, Disp: 30 capsule, Rfl: 0    valACYclovir (VALTREX) 1,000 mg tablet, Take 1 tablet (1,000 mg total) by mouth 2 (two) times a day for 7 days, Disp: 14 tablet, Rfl: 0    HPI    The following portions of the patient's history were reviewed and updated as appropriate: allergies, current medications, past family history, past medical history, past social history, past surgical history and problem list     Review of Systems      Objective: There were no vitals taken for this visit       Physical Exam

## 2021-09-29 NOTE — PROGRESS NOTES
ASSESSMENT/PLAN:    Case and plan discussed with Dr Bakari Benedict     Diagnoses and all orders for this visit:    Type 2 diabetes mellitus with stage 3 chronic kidney disease, with long-term current use of insulin, unspecified whether stage 3a or 3b CKD ([facility] )  Lab Results   Component Value Date    HGBA1C 7 7 (H) 09/03/2021    HGBA1C 7 7 (H) 01/11/2021     Micro/Cr improving 453 to 377  Continue with current insulin regime and follows with endocrinology    Diabetic polyneuropathy associated with type 2 diabetes mellitus ([facility] )  Stopped taking gabapentin and has been trying a home remedy that seems to be helping him    Coronary artery disease involving native coronary artery of native heart without angina pectoris    Hypertension with renal disease  Stable    Carcinoma in situ of bladder  S/o cysto  Atypical urothelial cells noted  Will attempt to assit in scheduling TURBT      Encounter for immunization  -     FLUZONE HIGH-DOSE: influenza vaccine, high-dose, preservative-free 0 7 mL    Degeneration of lumbar intervertebral disc  -     DULoxetine (CYMBALTA) 20 mg capsule; Take 1 capsule (20 mg total) by mouth daily      Lumbar radiculopathy  -     DULoxetine (CYMBALTA) 20 mg capsule; Take 1 capsule (20 mg total) by mouth daily    Anxiety  -     ALPRAZolam (XANAX) 0 25 mg tablet; At twice a day as needed for anxiety    Vitamin D deficiency  -     calcitriol (ROCALTROL) 0 25 mcg capsule; Take 1 capsule (0 25 mcg total) by mouth daily    CKD (chronic kidney disease) stage 4, GFR 15-29 ml/min ([facility])  Stable     Advised him to schedule an appt with V nephro    Seasonal allergies  Advised him to use OTC 2nd gen antihistamine and start fluticasone spray  -     fluticasone (FLONASE) 50 mcg/act nasal spray; 1 spray into each nostril daily      F/u in 3 months      Immunization History   Administered Date(s) Administered    DT (pediatric) 01/24/2014    DTaP 5 07/01/2007    H1N1, All Formulations 12/16/2009    INFLUENZA 10/12/2007, 10/06/2008, 11/01/2008, 09/14/2009, 09/30/2010, 10/01/2011, 10/09/2012, 11/10/2013, 12/01/2014, 01/26/2015, 01/23/2017, 09/28/2017, 10/15/2018, 01/04/2019, 10/29/2019    Influenza Quadrivalent, 6-35 Months IM 11/14/2014    Influenza Split High Dose Preservative Free IM 01/23/2017, 09/28/2017    Influenza, high dose seasonal 0 7 mL 11/01/2018, 10/09/2020    Influenza, seasonal, injectable 10/06/2011, 11/13/2013, 09/15/2015    Influenza, seasonal, injectable, preservative free 09/23/2015    Pneumococcal 01/01/2007, 10/01/2017    Pneumococcal Conjugate 13-Valent 08/04/2015, 09/23/2015, 10/01/2017    Pneumococcal Polysaccharide PPV23 03/01/2004, 11/12/2008, 07/10/2013, 09/15/2017    SARS-CoV-2 / COVID-19 mRNA IM ([employer]) 02/26/2021, 03/18/2021, 08/28/2021    Td (adult), Unspecified 09/30/2010    Tdap 08/15/2014    Tuberculin Skin Test-PPD Intradermal 05/08/2019         HISTORY OF PRESENT ILLNESS:    Patient presents for 2 month f/u  Last visit on 7/2021  He has been to the ED twice since then  The lastest was due to hematuria  He was seen by urology and he is to be scheduled for TURBT  Reports he has been having an exacerbation for his allergies  He has responding well to medication  Labs reviewed  A1c 7 7 on 9/03, Micro/Cr improving 453 to 377  Has not been seen by nephrology  Record show V was attempting ot schedule an appt 0n 6/2021  Cologurad was negative  Review of Systems   Constitutional: Negative for appetite change, fatigue and fever  HENT: Positive for congestion and postnasal drip  Negative for sore throat  Eyes: Positive for itching  Negative for visual disturbance  Respiratory: Negative for cough and shortness of breath  Cardiovascular: Negative for chest pain and palpitations  Gastrointestinal: Negative for abdominal pain, constipation, diarrhea, nausea and vomiting  Endocrine: Negative  Genitourinary: Negative  Musculoskeletal: Negative      Skin: Negative  Neurological: Negative for dizziness and headaches  Hematological: Negative  Psychiatric/Behavioral: Negative  OBJECTIVE:  Vitals:    09/29/21 0818   BP: 120/80   BP Location: Left arm   Patient Position: Sitting   Cuff Size: Standard   Pulse: 80   Resp: 12   Temp: 97 7 °F (36 5 °C)   Weight: 108 kg (237 lb)   Height: 6' 2 5" (1 892 m)       Physical Exam:   GENERAL: NAD, Normal appearance  Non diaphoretic, non-toxic, not ill-appearing, well-developed, well-nourished  NEUROLOGIC:  Alert/oriented x3  No motor or sensory deficits  HEENT:  NC/AT, PERRL, EOMI, MMM, no scleral icterus  CARDIAC:  RRR, +S1/S2, no S3/S4 heard, no m/g/r  PULMONARY:  non-labored breathing, CTA B/L, no wheezing/rales/rhonci appreciated at time of encounter  ABDOMEN:  Soft, NT/ND, +BS, no rebound/guarding/rigidity  Extremities:  2+ Pulses in DP/PT  No edema, cyanosis, or clubbing  SKIN:  No rashes or erythema        Current Outpatient Medications:     acetaminophen (TYLENOL) 325 mg tablet, Take 650 mg by mouth every 6 (six) hours as needed for mild pain, Disp: , Rfl:     ALPRAZolam (XANAX) 0 25 mg tablet, At twice a day as needed for anxiety, Disp: 30 tablet, Rfl: 0    amoxicillin (AMOXIL) 500 mg capsule, , Disp: , Rfl:     aspirin 81 mg chewable tablet, Chew 1 tablet (81 mg total) daily, Disp: 30 tablet, Rfl: 0    Azelastine HCl 0 15 % SOLN, Inhale 1 spray 2 (two) times a day (Patient taking differently: Inhale 1 spray 2 (two) times a day as needed ), Disp: 30 mL, Rfl: 3    Bimatoprost (LUMIGAN OP), Apply to eye , Disp: , Rfl:     bismuth subsalicylate (PEPTO BISMOL) 262 MG chewable tablet, Chew 524 mg daily as needed for indigestion  , Disp: , Rfl:     calcitriol (ROCALTROL) 0 25 mcg capsule, Take 1 capsule (0 25 mcg total) by mouth daily, Disp: 30 capsule, Rfl: 1    docusate sodium (COLACE) 100 mg capsule, Take 1 capsule (100 mg total) by mouth 2 (two) times a day, Disp: 10 capsule, Rfl: 0    DULoxetine (CYMBALTA) 20 mg capsule, Take 1 capsule (20 mg total) by mouth daily, Disp: 30 capsule, Rfl: 1    ezetimibe (ZETIA) 10 mg tablet, 1 tablet, Disp: , Rfl:     Ferrous Sulfate Dried (Feosol) 200 (65 Fe) MG TABS, Take 65 mg by mouth daily, Disp: 30 each, Rfl: 1    fluocinonide (LIDEX) 0 05 % cream, Apply topically 2 (two) times a day, Disp: 30 g, Rfl: 0    fluticasone (FLONASE) 50 mcg/act nasal spray, One spray in each nostril twice a day, Disp: 1 Bottle, Rfl: 3    furosemide (LASIX) 20 mg tablet, TAKE 1 TABLET BY MOUTH AS NEEDED FOR EDEMA, Disp: 90 tablet, Rfl: 1    gabapentin (NEURONTIN) 300 mg capsule, Take 1 capsule (300 mg total) by mouth daily at bedtime, Disp: 90 capsule, Rfl: 3    gabapentin (NEURONTIN) 600 MG tablet, Take 1 tablet (600 mg total) by mouth 3 (three) times a day, Disp: 90 tablet, Rfl: 2    Insulin Pen Needle 31G X 8 MM MISC, Inject 3 times a day, Disp: 100 each, Rfl: 2    isosorbide mononitrate (IMDUR) 30 mg 24 hr tablet, Take 3 tablets (90 mg total) by mouth daily (Patient taking differently: Take 30 mg by mouth daily ), Disp: 90 tablet, Rfl: 0    isosorbide mononitrate (IMDUR) 30 mg 24 hr tablet, Take 30 mg by mouth daily, Disp: , Rfl:     Lantus SoloStar 100 units/mL injection pen, 18 units qhs, Disp: 30 mL, Rfl: 1    latanoprost (XALATAN) 0 005 % ophthalmic solution, Administer 1 drop to both eyes daily at bedtime, Disp: , Rfl:     loperamide (IMODIUM) 2 mg capsule, Take 2 mg by mouth 4 (four) times a day as needed for diarrhea, Disp: , Rfl:     metoprolol succinate (TOPROL-XL) 100 mg 24 hr tablet, TAKE 1 TABLET(100 MG) BY MOUTH DAILY, Disp: 30 tablet, Rfl: 0    Mirabegron ER (Myrbetriq) 50 MG TB24, Take 1 tablet (50 mg total) by mouth daily (Patient not taking: Reported on 9/15/2021), Disp: 30 tablet, Rfl: 1    multivitamin (THERAGRAN) TABS, Take 1 tablet by mouth daily  , Disp: , Rfl:     naloxone (NARCAN) 4 mg/0 1 mL nasal spray, Administer 1 spray into a nostril   If no response after 2-3 minutes, give another dose in the other nostril using a new spray , Disp: 1 each, Rfl: 1    NovoLOG FlexPen 100 units/mL injection pen, 10 units with each meal , Disp: 5 pen, Rfl: 1    omeprazole (PriLOSEC) 20 mg delayed release capsule, Take 20 mg by mouth daily, Disp: , Rfl:     OXYCODONE HCL ER PO, Take by mouth, Disp: , Rfl:     pantoprazole (PROTONIX) 40 mg tablet, Take 1 tablet (40 mg total) by mouth daily in the early morning, Disp: 90 tablet, Rfl: 1    sertraline (ZOLOFT) 50 mg tablet, Take 1 tablet (50 mg total) by mouth daily, Disp: 30 tablet, Rfl: 3    tamsulosin (FLOMAX) 0 4 mg, Take 1 capsule (0 4 mg total) by mouth daily at bedtime, Disp: 30 capsule, Rfl: 0    valACYclovir (VALTREX) 1,000 mg tablet, Take 1 tablet (1,000 mg total) by mouth 2 (two) times a day for 7 days, Disp: 14 tablet, Rfl: 0    Past Medical History:   Diagnosis Date    Anemia     Last assessed: 9/28/17    Anxiety     Arteriosclerotic cardiovascular disease     Last assessed: 9/28/17    Arthritis     Bladder cancer ([facility])     bladder- had BCG treatments    Chronic kidney disease     CKD (chronic kidney disease) stage 4, GFR 15-29 ml/min ([facility])     Colon polyp     Coronary artery disease     7 stents    Depression     Diabetes mellitus ([facility])     IDDM    GERD (gastroesophageal reflux disease)     Glaucoma     History of fusion of cervical spine     Hyperlipidemia     Hypertension     Insomnia     Last assessed: 11/14/12    Loss of hearing     has hearing aids but usually does not wear them    Other seasonal allergic rhinitis     Last assessed: 2/10/16    PAD (peripheral artery disease) ([facility])     Shortness of breath     on exertion    Spinal stenosis of lumbar region     Transient cerebral ischemia     Uses walker     w/c for longer distances     Past Surgical History:   Procedure Laterality Date    CARDIAC SURGERY      Cath stent placement  Last assessed: 3/9/17   Interventional Catheterization    CHOLECYSTECTOMY      COLONOSCOPY      CYSTOSCOPY      Diagnostic w/biopsy  Lux Bryson  Last assessed: 12/1/14    CYSTOURETHROSCOPY      w/cautery  Lux Bryson    GASTRIC BYPASS      For morbid obesity w/Shaji-en-Y   Resolved: 11/17/09    INCISION AND DRAINAGE OF WOUND Right 2/26/2017    Procedure: INCISION AND DRAINAGE (I&D) EXTREMITY WITH APPLICATION OF GRAFT JACKET;  Surgeon: Elyse Lee DPM;  [facility];  Service:     INCISION AND DRAINAGE OF WOUND Right 4/25/2017    Procedure: INCISION AND DRAINAGE (I&D) EXTREMITY, APPLICATION OF GRAFT;  Surgeon: Elyse Lee DPM;  [facility];  Service:     IR BIOPSY OTHER  7/2/2020    IR LOWER EXTREMITY ANGIOGRAM  2/8/2021    IR LOWER EXTREMITY ANGIOGRAM  2/11/2021    IR TUNNELED CENTRAL LINE PLACEMENT  12/24/2020    JOINT REPLACEMENT      christofer knees replaced    ID AMPUTATION METATARSAL+TOE,SINGLE Left 12/21/2020    Procedure: RAY RESECTION FOOT;  Surgeon: Princess Luis DPM;  [facility];  Service: Podiatry    ID AMPUTATION METATARSAL+TOE,SINGLE Left 12/31/2020    Procedure: 5TH MET RESECTION;  Surgeon: Princess Luis DPM;  [facility];  Service: Podiatry    ID CYSTOURETHROSCOPY W/IRRIG & EVAC CLOTS N/A 2/10/2021    Procedure: CYSTOSCOPY EVACUATION OF CLOTS, fulguration;  Surgeon: Mayte Weldon MD;  [facility];  Service: Urology    ID CYSTOURETHROSCOPY,BIOPSY N/A 8/16/2016    Procedure: CYSTOSCOPY WITH BIOPSIES;  Surgeon: Veronica Renteria MD;  [facility];  Service: Urology    ID CYSTOURETHROSCOPY,FULGUR <0 5 CM LESN N/A 11/19/2020    Procedure: CYSTO W/BIOPSIES, transurethral prostate bx;  Surgeon: Marlene Carlson MD;  [facility];  Service: Urology    ID Norval Favor 3RD+ ORD [address] [facility] Left 2/8/2021    Procedure: LEG angiogram, CO2 w/limited contrast with balloon angioplasty postertior tibial artery;  Surgeon: Eduardo Silva MD;  [facility];  Service: Vascular    ROTATOR CUFF REPAIR      SMALL INTESTINE SURGERY      Surgery Shaji-en-Y    SPINAL FUSION      lumbar and cervical fusions    VAC DRESSING APPLICATION Right 3/15/1360    Procedure: APPLICATION VAC DRESSING;  Surgeon: Gallo Fernandez DPM;  [facility];  Service:    [address] [place] [address] Left 2021    Procedure: FOOT DEBRIDE, [address] OUT w/graft application;  Surgeon: Gallo Fernandez DPM;  [facility];  Service: Podiatry     Family History   Problem Relation Age of Onset    Diabetes Mother     Heart disease Mother     Other Mother         High blood pressure    Heart disease Father     Diabetes Sister     Other Sister         High blood pressure    Kidney disease Sister     Heart disease Brother     Other Brother         High blood pressure     Social History     Socioeconomic History    Marital status: /Civil Union     Spouse name: Not on file    Number of children: Not on file    Years of education: Not on file    Highest education level: Not on file   Occupational History    Not on file   Tobacco Use    Smoking status: Former Smoker     Packs/day: 3 00     Years: 27 00     Pack years: 81 00     Types: Cigarettes     Quit date:      Years since quittin 7    Smokeless tobacco: Never Used   Vaping Use    Vaping Use: Never used   Substance and Sexual Activity    Alcohol use: Not Currently     Comment: beer / liquor    Drug use: Not Currently     Types: Marijuana     Comment: quit 2019 had medical marijuana    Sexual activity: Not Currently   Other Topics Concern    Not on file   Social History Narrative    Consumes 1 cup of coffee and 1 soda per day     Social Determinants of Health     Financial Resource Strain:     Difficulty of Paying Living Expenses:    Food Insecurity:     Worried About Running Out of Food in the Last Year:     Cj of Food in the Last Year:    Transportation Needs:     Lack of Transportation (Medical):  Lack of Transportation (Non-Medical):    Physical Activity:     Days of Exercise per Week:     Minutes of Exercise per Session:    Stress:     Feeling of Stress :    Social Connections:     Frequency of Communication with Friends and Family:     Frequency of Social Gatherings with Friends and Family:     Attends [employer] Services:     Active Member of Clubs or Organizations:     Attends Club or Organization Meetings:     Marital Status:    Intimate Partner Violence:     Fear of Current or Ex-Partner:     Emotionally Abused:     Physically Abused:     Sexually Abused:      Social History     Tobacco Use   Smoking Status Former Smoker    Packs/day: 3 00    Years: 27 00    Pack years: 81 00    Types: Cigarettes    Quit date: 5    Years since quittin 7   Smokeless Tobacco Never Used     Social History     Substance and Sexual Activity   Alcohol Use Not Currently    Comment: beer / liquor     Social History     Substance and Sexual Activity   Drug Use Not Currently    Types: Marijuana    Comment: quit 2019 had medical marijuana         Brenna Fernandes MD  Internal Medicine Residency, PGY-3  [facility]         The patient does not have a history of falls  I did complete a risk assessment for falls  A plan of care for falls was documented  Falls Plan of Care: Balance, strength, and gait training instructions were provided

## 2021-09-30 NOTE — TELEPHONE ENCOUNTER
Clinical, patient will need a pathology review with Dr Miguelito Mitchell  None available, please call to arrange

## 2021-10-07 ENCOUNTER — ANESTHESIA EVENT (OUTPATIENT)
Dept: PERIOP | Facility: AMBULARY SURGERY CENTER | Age: 76
End: 2021-10-07
Payer: MEDICARE

## 2021-10-12 ENCOUNTER — CONSULT (OUTPATIENT)
Dept: INTERNAL MEDICINE CLINIC | Facility: CLINIC | Age: 76
End: 2021-10-12
Payer: MEDICARE

## 2021-10-12 VITALS
HEART RATE: 70 BPM | HEIGHT: 75 IN | BODY MASS INDEX: 28.72 KG/M2 | TEMPERATURE: 97.9 F | DIASTOLIC BLOOD PRESSURE: 80 MMHG | WEIGHT: 231 LBS | SYSTOLIC BLOOD PRESSURE: 113 MMHG | RESPIRATION RATE: 13 BRPM

## 2021-10-12 DIAGNOSIS — E11.22 TYPE 2 DIABETES MELLITUS WITH STAGE 3 CHRONIC KIDNEY DISEASE, WITH LONG-TERM CURRENT USE OF INSULIN, UNSPECIFIED WHETHER STAGE 3A OR 3B CKD (HCC): Primary | ICD-10-CM

## 2021-10-12 DIAGNOSIS — Z01.818 PREOPERATIVE CLEARANCE: ICD-10-CM

## 2021-10-12 DIAGNOSIS — E11.42 DIABETIC POLYNEUROPATHY ASSOCIATED WITH TYPE 2 DIABETES MELLITUS (HCC): ICD-10-CM

## 2021-10-12 DIAGNOSIS — N25.81 SECONDARY RENAL HYPERPARATHYROIDISM (HCC): ICD-10-CM

## 2021-10-12 DIAGNOSIS — I12.9 HYPERTENSION WITH RENAL DISEASE: ICD-10-CM

## 2021-10-12 DIAGNOSIS — N18.30 TYPE 2 DIABETES MELLITUS WITH STAGE 3 CHRONIC KIDNEY DISEASE, WITH LONG-TERM CURRENT USE OF INSULIN, UNSPECIFIED WHETHER STAGE 3A OR 3B CKD (HCC): Primary | ICD-10-CM

## 2021-10-12 DIAGNOSIS — D09.0 CARCINOMA IN SITU OF BLADDER: ICD-10-CM

## 2021-10-12 DIAGNOSIS — Z79.4 TYPE 2 DIABETES MELLITUS WITH STAGE 3 CHRONIC KIDNEY DISEASE, WITH LONG-TERM CURRENT USE OF INSULIN, UNSPECIFIED WHETHER STAGE 3A OR 3B CKD (HCC): Primary | ICD-10-CM

## 2021-10-12 DIAGNOSIS — I25.10 CORONARY ARTERY DISEASE INVOLVING NATIVE CORONARY ARTERY OF NATIVE HEART WITHOUT ANGINA PECTORIS: ICD-10-CM

## 2021-10-12 DIAGNOSIS — E55.9 VITAMIN D DEFICIENCY: ICD-10-CM

## 2021-10-12 DIAGNOSIS — E78.2 MIXED HYPERLIPIDEMIA: ICD-10-CM

## 2021-10-12 DIAGNOSIS — I73.9 PAD (PERIPHERAL ARTERY DISEASE) (HCC): ICD-10-CM

## 2021-10-12 PROCEDURE — 93000 ELECTROCARDIOGRAM COMPLETE: CPT | Performed by: INTERNAL MEDICINE

## 2021-10-12 PROCEDURE — 99213 OFFICE O/P EST LOW 20 MIN: CPT | Performed by: INTERNAL MEDICINE

## 2021-10-13 ENCOUNTER — APPOINTMENT (OUTPATIENT)
Dept: LAB | Facility: CLINIC | Age: 76
End: 2021-10-13
Payer: MEDICARE

## 2021-10-13 DIAGNOSIS — I12.9 HYPERTENSION WITH RENAL DISEASE: ICD-10-CM

## 2021-10-13 DIAGNOSIS — R39.89 SUSPECTED UTI: ICD-10-CM

## 2021-10-13 DIAGNOSIS — Z85.51 HISTORY OF BLADDER CANCER: ICD-10-CM

## 2021-10-13 DIAGNOSIS — E11.42 DIABETIC POLYNEUROPATHY ASSOCIATED WITH TYPE 2 DIABETES MELLITUS (HCC): ICD-10-CM

## 2021-10-13 DIAGNOSIS — N18.4 CHRONIC KIDNEY DISEASE, STAGE IV (SEVERE) (HCC): ICD-10-CM

## 2021-10-13 DIAGNOSIS — E11.29 TYPE 2 DIABETES MELLITUS WITH OTHER KIDNEY COMPLICATION, UNSPECIFIED WHETHER LONG TERM INSULIN USE (HCC): ICD-10-CM

## 2021-10-13 DIAGNOSIS — N18.30 TYPE 2 DIABETES MELLITUS WITH STAGE 3 CHRONIC KIDNEY DISEASE, WITH LONG-TERM CURRENT USE OF INSULIN, UNSPECIFIED WHETHER STAGE 3A OR 3B CKD (HCC): ICD-10-CM

## 2021-10-13 DIAGNOSIS — Z01.812 PRE-OPERATIVE LABORATORY EXAMINATION: ICD-10-CM

## 2021-10-13 DIAGNOSIS — D09.0 CARCINOMA IN SITU OF BLADDER: ICD-10-CM

## 2021-10-13 DIAGNOSIS — Z01.818 PREOPERATIVE CLEARANCE: ICD-10-CM

## 2021-10-13 DIAGNOSIS — E11.22 TYPE 2 DIABETES MELLITUS WITH STAGE 3 CHRONIC KIDNEY DISEASE, WITH LONG-TERM CURRENT USE OF INSULIN, UNSPECIFIED WHETHER STAGE 3A OR 3B CKD (HCC): ICD-10-CM

## 2021-10-13 DIAGNOSIS — Z79.4 TYPE 2 DIABETES MELLITUS WITH STAGE 3 CHRONIC KIDNEY DISEASE, WITH LONG-TERM CURRENT USE OF INSULIN, UNSPECIFIED WHETHER STAGE 3A OR 3B CKD (HCC): ICD-10-CM

## 2021-10-13 DIAGNOSIS — R31.0 GROSS HEMATURIA: ICD-10-CM

## 2021-10-13 DIAGNOSIS — E55.9 VITAMIN D DEFICIENCY: ICD-10-CM

## 2021-10-13 LAB
25(OH)D3 SERPL-MCNC: 43.6 NG/ML (ref 30–100)
ALBUMIN SERPL BCP-MCNC: 3.7 G/DL (ref 3.5–5)
ALP SERPL-CCNC: 104 U/L (ref 46–116)
ALT SERPL W P-5'-P-CCNC: 117 U/L (ref 12–78)
ANION GAP SERPL CALCULATED.3IONS-SCNC: 7 MMOL/L (ref 4–13)
AST SERPL W P-5'-P-CCNC: 45 U/L (ref 5–45)
BASOPHILS # BLD AUTO: 0.04 THOUSANDS/ΜL (ref 0–0.1)
BASOPHILS NFR BLD AUTO: 1 % (ref 0–1)
BILIRUB SERPL-MCNC: 0.67 MG/DL (ref 0.2–1)
BUN SERPL-MCNC: 45 MG/DL (ref 5–25)
CALCIUM SERPL-MCNC: 9.3 MG/DL (ref 8.3–10.1)
CHLORIDE SERPL-SCNC: 107 MMOL/L (ref 100–108)
CO2 SERPL-SCNC: 24 MMOL/L (ref 21–32)
CREAT SERPL-MCNC: 3.32 MG/DL (ref 0.6–1.3)
EOSINOPHIL # BLD AUTO: 0.21 THOUSAND/ΜL (ref 0–0.61)
EOSINOPHIL NFR BLD AUTO: 3 % (ref 0–6)
ERYTHROCYTE [DISTWIDTH] IN BLOOD BY AUTOMATED COUNT: 13.2 % (ref 11.6–15.1)
FERRITIN SERPL-MCNC: 40 NG/ML (ref 8–388)
GFR SERPL CREATININE-BSD FRML MDRD: 19 ML/MIN/1.73SQ M
GLUCOSE P FAST SERPL-MCNC: 90 MG/DL (ref 65–99)
HCT VFR BLD AUTO: 37.7 % (ref 36.5–49.3)
HGB BLD-MCNC: 12.4 G/DL (ref 12–17)
IMM GRANULOCYTES # BLD AUTO: 0.03 THOUSAND/UL (ref 0–0.2)
IMM GRANULOCYTES NFR BLD AUTO: 0 % (ref 0–2)
LYMPHOCYTES # BLD AUTO: 2.06 THOUSANDS/ΜL (ref 0.6–4.47)
LYMPHOCYTES NFR BLD AUTO: 28 % (ref 14–44)
MAGNESIUM SERPL-MCNC: 2.6 MG/DL (ref 1.6–2.6)
MCH RBC QN AUTO: 31.4 PG (ref 26.8–34.3)
MCHC RBC AUTO-ENTMCNC: 32.9 G/DL (ref 31.4–37.4)
MCV RBC AUTO: 95 FL (ref 82–98)
MONOCYTES # BLD AUTO: 0.55 THOUSAND/ΜL (ref 0.17–1.22)
MONOCYTES NFR BLD AUTO: 7 % (ref 4–12)
NEUTROPHILS # BLD AUTO: 4.61 THOUSANDS/ΜL (ref 1.85–7.62)
NEUTS SEG NFR BLD AUTO: 61 % (ref 43–75)
NRBC BLD AUTO-RTO: 0 /100 WBCS
PLATELET # BLD AUTO: 181 THOUSANDS/UL (ref 149–390)
PMV BLD AUTO: 11.3 FL (ref 8.9–12.7)
POTASSIUM SERPL-SCNC: 4.8 MMOL/L (ref 3.5–5.3)
PROT SERPL-MCNC: 7.8 G/DL (ref 6.4–8.2)
PTH-INTACT SERPL-MCNC: 243.9 PG/ML (ref 18.4–80.1)
RBC # BLD AUTO: 3.95 MILLION/UL (ref 3.88–5.62)
SODIUM SERPL-SCNC: 138 MMOL/L (ref 136–145)
TSH SERPL DL<=0.05 MIU/L-ACNC: 2.17 UIU/ML (ref 0.36–3.74)
URATE SERPL-MCNC: 9.1 MG/DL (ref 4.2–8)
WBC # BLD AUTO: 7.5 THOUSAND/UL (ref 4.31–10.16)

## 2021-10-13 PROCEDURE — 82728 ASSAY OF FERRITIN: CPT

## 2021-10-13 PROCEDURE — 87086 URINE CULTURE/COLONY COUNT: CPT

## 2021-10-13 PROCEDURE — 82306 VITAMIN D 25 HYDROXY: CPT

## 2021-10-13 PROCEDURE — 85025 COMPLETE CBC W/AUTO DIFF WBC: CPT

## 2021-10-13 PROCEDURE — 84550 ASSAY OF BLOOD/URIC ACID: CPT

## 2021-10-13 PROCEDURE — 83735 ASSAY OF MAGNESIUM: CPT

## 2021-10-13 PROCEDURE — 83970 ASSAY OF PARATHORMONE: CPT

## 2021-10-13 PROCEDURE — 84443 ASSAY THYROID STIM HORMONE: CPT

## 2021-10-13 PROCEDURE — 36415 COLL VENOUS BLD VENIPUNCTURE: CPT

## 2021-10-13 PROCEDURE — 80053 COMPREHEN METABOLIC PANEL: CPT

## 2021-10-14 LAB — BACTERIA UR CULT: NORMAL

## 2021-10-15 PROCEDURE — NC001 PR NO CHARGE: Performed by: UROLOGY

## 2021-10-15 NOTE — TELEPHONE ENCOUNTER
Clearance request was faxed this morning on PAT/Anes request  They were given [facility] direct fax number

## 2021-10-15 NOTE — TELEPHONE ENCOUNTER
Francisca from Nephrology called to say Dr Cecilio Brennan ordered lab work to be done tonight or tomorrow in order to clear him for his surgery on Monday  Clearance will be done pending on lab work and will send over on soon as labs are received

## 2021-10-15 NOTE — TELEPHONE ENCOUNTER
Imani Sandoval [phone] from [facility] called in stating patient needs Nephology clearance prior to surgery on Monday  Imani Sandoval [phone] stated  request to Dr Mala Sandoval [phone] can be reached at [phone]

## 2021-10-18 ENCOUNTER — ANESTHESIA (OUTPATIENT)
Dept: PERIOP | Facility: AMBULARY SURGERY CENTER | Age: 76
End: 2021-10-18
Payer: MEDICARE

## 2021-10-18 ENCOUNTER — TELEPHONE (OUTPATIENT)
Dept: UROLOGY | Facility: AMBULATORY SURGERY CENTER | Age: 76
End: 2021-10-18

## 2021-10-18 ENCOUNTER — HOSPITAL ENCOUNTER (OUTPATIENT)
Facility: AMBULARY SURGERY CENTER | Age: 76
Setting detail: OUTPATIENT SURGERY
Discharge: HOME | End: 2021-10-18
Attending: UROLOGY | Admitting: UROLOGY
Payer: MEDICARE

## 2021-10-18 ENCOUNTER — APPOINTMENT (OUTPATIENT)
Dept: RADIOLOGY | Facility: AMBULARY SURGERY CENTER | Age: 76
End: 2021-10-18
Payer: MEDICARE

## 2021-10-18 ENCOUNTER — TELEPHONE (OUTPATIENT)
Dept: UROLOGY | Facility: MEDICAL CENTER | Age: 76
End: 2021-10-18

## 2021-10-18 VITALS
RESPIRATION RATE: 17 BRPM | HEART RATE: 69 BPM | TEMPERATURE: 96.7 F | BODY MASS INDEX: 28.85 KG/M2 | OXYGEN SATURATION: 99 % | DIASTOLIC BLOOD PRESSURE: 64 MMHG | SYSTOLIC BLOOD PRESSURE: 121 MMHG | HEIGHT: 75 IN | WEIGHT: 232 LBS

## 2021-10-18 DIAGNOSIS — D09.0 CARCINOMA IN SITU OF BLADDER: Primary | ICD-10-CM

## 2021-10-18 DIAGNOSIS — Z85.51 HISTORY OF BLADDER CANCER: ICD-10-CM

## 2021-10-18 DIAGNOSIS — R31.0 GROSS HEMATURIA: ICD-10-CM

## 2021-10-18 LAB
GLUCOSE SERPL-MCNC: 103 MG/DL (ref 65–140)
GLUCOSE SERPL-MCNC: 98 MG/DL (ref 65–140)

## 2021-10-18 PROCEDURE — 88112 CYTOPATH CELL ENHANCE TECH: CPT | Performed by: PATHOLOGY

## 2021-10-18 PROCEDURE — 82948 REAGENT STRIP/BLOOD GLUCOSE: CPT

## 2021-10-18 PROCEDURE — 52235 CYSTOSCOPY AND TREATMENT: CPT | Performed by: UROLOGY

## 2021-10-18 PROCEDURE — 99024 POSTOP FOLLOW-UP VISIT: CPT | Performed by: UROLOGY

## 2021-10-18 PROCEDURE — 88341 IMHCHEM/IMCYTCHM EA ADD ANTB: CPT | Performed by: PATHOLOGY

## 2021-10-18 PROCEDURE — C1769 GUIDE WIRE: HCPCS | Performed by: UROLOGY

## 2021-10-18 PROCEDURE — 88313 SPECIAL STAINS GROUP 2: CPT | Performed by: PATHOLOGY

## 2021-10-18 PROCEDURE — 88342 IMHCHEM/IMCYTCHM 1ST ANTB: CPT | Performed by: PATHOLOGY

## 2021-10-18 PROCEDURE — 74420 UROGRAPHY RTRGR +-KUB: CPT

## 2021-10-18 PROCEDURE — 88307 TISSUE EXAM BY PATHOLOGIST: CPT | Performed by: PATHOLOGY

## 2021-10-18 RX ORDER — OXYCODONE HYDROCHLORIDE AND ACETAMINOPHEN 5; 325 MG/1; MG/1
1 TABLET ORAL EVERY 4 HOURS PRN
Status: DISCONTINUED | OUTPATIENT
Start: 2021-10-18 | End: 2021-10-18 | Stop reason: HOSPADM

## 2021-10-18 RX ORDER — LIDOCAINE HYDROCHLORIDE 10 MG/ML
INJECTION, SOLUTION EPIDURAL; INFILTRATION; INTRACAUDAL; PERINEURAL AS NEEDED
Status: DISCONTINUED | OUTPATIENT
Start: 2021-10-18 | End: 2021-10-18

## 2021-10-18 RX ORDER — FENTANYL CITRATE 50 UG/ML
INJECTION, SOLUTION INTRAMUSCULAR; INTRAVENOUS AS NEEDED
Status: DISCONTINUED | OUTPATIENT
Start: 2021-10-18 | End: 2021-10-18

## 2021-10-18 RX ORDER — CEFAZOLIN SODIUM 1 G/3ML
INJECTION, POWDER, FOR SOLUTION INTRAMUSCULAR; INTRAVENOUS AS NEEDED
Status: DISCONTINUED | OUTPATIENT
Start: 2021-10-18 | End: 2021-10-18

## 2021-10-18 RX ORDER — SODIUM CHLORIDE, SODIUM LACTATE, POTASSIUM CHLORIDE, CALCIUM CHLORIDE 600; 310; 30; 20 MG/100ML; MG/100ML; MG/100ML; MG/100ML
INJECTION, SOLUTION INTRAVENOUS CONTINUOUS PRN
Status: DISCONTINUED | OUTPATIENT
Start: 2021-10-18 | End: 2021-10-18

## 2021-10-18 RX ORDER — CEFAZOLIN SODIUM 1 G/50ML
1000 SOLUTION INTRAVENOUS ONCE
Status: COMPLETED | OUTPATIENT
Start: 2021-10-18 | End: 2021-10-18

## 2021-10-18 RX ORDER — ONDANSETRON 2 MG/ML
INJECTION INTRAMUSCULAR; INTRAVENOUS AS NEEDED
Status: DISCONTINUED | OUTPATIENT
Start: 2021-10-18 | End: 2021-10-18

## 2021-10-18 RX ORDER — CEFUROXIME AXETIL 250 MG/1
250 TABLET ORAL DAILY
Qty: 10 TABLET | Refills: 0 | Status: SHIPPED | OUTPATIENT
Start: 2021-10-18 | End: 2021-10-26 | Stop reason: HOSPADM

## 2021-10-18 RX ORDER — PROPOFOL 10 MG/ML
INJECTION, EMULSION INTRAVENOUS AS NEEDED
Status: DISCONTINUED | OUTPATIENT
Start: 2021-10-18 | End: 2021-10-18

## 2021-10-18 RX ORDER — MAGNESIUM HYDROXIDE 1200 MG/15ML
LIQUID ORAL AS NEEDED
Status: DISCONTINUED | OUTPATIENT
Start: 2021-10-18 | End: 2021-10-18 | Stop reason: HOSPADM

## 2021-10-18 RX ORDER — LIDOCAINE HYDROCHLORIDE 10 MG/ML
0.5 INJECTION, SOLUTION EPIDURAL; INFILTRATION; INTRACAUDAL; PERINEURAL ONCE AS NEEDED
Status: DISCONTINUED | OUTPATIENT
Start: 2021-10-18 | End: 2021-10-18 | Stop reason: HOSPADM

## 2021-10-18 RX ORDER — SODIUM CHLORIDE, SODIUM LACTATE, POTASSIUM CHLORIDE, CALCIUM CHLORIDE 600; 310; 30; 20 MG/100ML; MG/100ML; MG/100ML; MG/100ML
125 INJECTION, SOLUTION INTRAVENOUS CONTINUOUS
Status: DISCONTINUED | OUTPATIENT
Start: 2021-10-18 | End: 2021-10-18 | Stop reason: HOSPADM

## 2021-10-18 RX ORDER — OXYCODONE HYDROCHLORIDE AND ACETAMINOPHEN 5; 325 MG/1; MG/1
2 TABLET ORAL EVERY 4 HOURS PRN
Status: DISCONTINUED | OUTPATIENT
Start: 2021-10-18 | End: 2021-10-18 | Stop reason: HOSPADM

## 2021-10-18 RX ORDER — EPHEDRINE SULFATE 50 MG/ML
INJECTION INTRAVENOUS AS NEEDED
Status: DISCONTINUED | OUTPATIENT
Start: 2021-10-18 | End: 2021-10-18

## 2021-10-18 RX ORDER — HYDROCODONE BITARTRATE AND ACETAMINOPHEN 5; 325 MG/1; MG/1
TABLET ORAL
Qty: 6 TABLET | Refills: 0 | Status: SHIPPED | OUTPATIENT
Start: 2021-10-18 | End: 2021-12-15 | Stop reason: SDUPTHER

## 2021-10-18 RX ORDER — FENTANYL CITRATE/PF 50 MCG/ML
25 SYRINGE (ML) INJECTION
Status: DISCONTINUED | OUTPATIENT
Start: 2021-10-18 | End: 2021-10-18 | Stop reason: HOSPADM

## 2021-10-18 RX ORDER — DEXAMETHASONE SODIUM PHOSPHATE 10 MG/ML
INJECTION, SOLUTION INTRAMUSCULAR; INTRAVENOUS AS NEEDED
Status: DISCONTINUED | OUTPATIENT
Start: 2021-10-18 | End: 2021-10-18

## 2021-10-18 RX ADMIN — CEFAZOLIN SODIUM 1000 MG: 1 SOLUTION INTRAVENOUS at 08:18

## 2021-10-18 RX ADMIN — DEXAMETHASONE SODIUM PHOSPHATE 4 MG: 10 INJECTION, SOLUTION INTRAMUSCULAR; INTRAVENOUS at 08:50

## 2021-10-18 RX ADMIN — PROPOFOL 120 MG: 10 INJECTION, EMULSION INTRAVENOUS at 08:47

## 2021-10-18 RX ADMIN — ONDANSETRON 4 MG: 2 INJECTION INTRAMUSCULAR; INTRAVENOUS at 08:47

## 2021-10-18 RX ADMIN — EPHEDRINE SULFATE 5 MG: 50 INJECTION, SOLUTION INTRAVENOUS at 08:50

## 2021-10-18 RX ADMIN — FENTANYL CITRATE 25 MCG: 50 INJECTION, SOLUTION INTRAMUSCULAR; INTRAVENOUS at 09:15

## 2021-10-18 RX ADMIN — PHENYLEPHRINE HYDROCHLORIDE 30 MCG/MIN: 10 INJECTION INTRAVENOUS at 08:50

## 2021-10-18 RX ADMIN — FENTANYL CITRATE 25 MCG: 50 INJECTION, SOLUTION INTRAMUSCULAR; INTRAVENOUS at 08:47

## 2021-10-18 RX ADMIN — SODIUM CHLORIDE, SODIUM LACTATE, POTASSIUM CHLORIDE, AND CALCIUM CHLORIDE: .6; .31; .03; .02 INJECTION, SOLUTION INTRAVENOUS at 08:43

## 2021-10-18 RX ADMIN — OXYCODONE HYDROCHLORIDE AND ACETAMINOPHEN 2 TABLET: 5; 325 TABLET ORAL at 11:35

## 2021-10-18 RX ADMIN — CEFAZOLIN SODIUM 2000 MG: 1 INJECTION, POWDER, FOR SOLUTION INTRAMUSCULAR; INTRAVENOUS at 08:41

## 2021-10-18 RX ADMIN — LIDOCAINE HYDROCHLORIDE 50 MG: 10 INJECTION, SOLUTION EPIDURAL; INFILTRATION; INTRACAUDAL at 08:47

## 2021-10-19 ENCOUNTER — HOSPITAL ENCOUNTER (INPATIENT)
Facility: HOSPITAL | Age: 76
LOS: 1 days | Discharge: HOME | DRG: 690 | End: 2021-10-20
Attending: EMERGENCY MEDICINE | Admitting: STUDENT IN AN ORGANIZED HEALTH CARE EDUCATION/TRAINING PROGRAM
Payer: MEDICARE

## 2021-10-19 ENCOUNTER — TELEPHONE (OUTPATIENT)
Dept: UROLOGY | Facility: CLINIC | Age: 76
End: 2021-10-19

## 2021-10-19 DIAGNOSIS — R31.0 GROSS HEMATURIA: Primary | ICD-10-CM

## 2021-10-19 DIAGNOSIS — I70.209 ARTERIOSCLEROSIS OF ARTERY OF EXTREMITY (HCC): ICD-10-CM

## 2021-10-19 PROBLEM — N30.01 ACUTE CYSTITIS WITH HEMATURIA: Status: ACTIVE | Noted: 2019-08-13

## 2021-10-19 LAB
ANION GAP SERPL CALCULATED.3IONS-SCNC: 11 MMOL/L (ref 4–13)
APTT PPP: 29 SECONDS (ref 23–37)
BACTERIA UR QL AUTO: ABNORMAL /HPF
BASOPHILS # BLD AUTO: 0.04 THOUSANDS/ΜL (ref 0–0.1)
BASOPHILS NFR BLD AUTO: 0 % (ref 0–1)
BILIRUB UR QL STRIP: NEGATIVE
BUN SERPL-MCNC: 42 MG/DL (ref 5–25)
CALCIUM SERPL-MCNC: 8.2 MG/DL (ref 8.3–10.1)
CHLORIDE SERPL-SCNC: 105 MMOL/L (ref 100–108)
CLARITY UR: ABNORMAL
CO2 SERPL-SCNC: 23 MMOL/L (ref 21–32)
COLOR UR: ABNORMAL
CREAT SERPL-MCNC: 2.97 MG/DL (ref 0.6–1.3)
EOSINOPHIL # BLD AUTO: 0.11 THOUSAND/ΜL (ref 0–0.61)
EOSINOPHIL NFR BLD AUTO: 1 % (ref 0–6)
ERYTHROCYTE [DISTWIDTH] IN BLOOD BY AUTOMATED COUNT: 13 % (ref 11.6–15.1)
GFR SERPL CREATININE-BSD FRML MDRD: 22 ML/MIN/1.73SQ M
GLUCOSE SERPL-MCNC: 138 MG/DL (ref 65–140)
GLUCOSE SERPL-MCNC: 177 MG/DL (ref 65–140)
GLUCOSE SERPL-MCNC: 220 MG/DL (ref 65–140)
GLUCOSE SERPL-MCNC: 234 MG/DL (ref 65–140)
GLUCOSE SERPL-MCNC: 282 MG/DL (ref 65–140)
GLUCOSE UR STRIP-MCNC: ABNORMAL MG/DL
HCT VFR BLD AUTO: 33.1 % (ref 36.5–49.3)
HGB BLD-MCNC: 11.1 G/DL (ref 12–17)
HGB UR QL STRIP.AUTO: ABNORMAL
IMM GRANULOCYTES # BLD AUTO: 0.04 THOUSAND/UL (ref 0–0.2)
IMM GRANULOCYTES NFR BLD AUTO: 0 % (ref 0–2)
INR PPP: 1.01 (ref 0.84–1.19)
KETONES UR STRIP-MCNC: NEGATIVE MG/DL
LEUKOCYTE ESTERASE UR QL STRIP: ABNORMAL
LYMPHOCYTES # BLD AUTO: 2.19 THOUSANDS/ΜL (ref 0.6–4.47)
LYMPHOCYTES NFR BLD AUTO: 22 % (ref 14–44)
MCH RBC QN AUTO: 32 PG (ref 26.8–34.3)
MCHC RBC AUTO-ENTMCNC: 33.5 G/DL (ref 31.4–37.4)
MCV RBC AUTO: 95 FL (ref 82–98)
MONOCYTES # BLD AUTO: 0.82 THOUSAND/ΜL (ref 0.17–1.22)
MONOCYTES NFR BLD AUTO: 8 % (ref 4–12)
NEUTROPHILS # BLD AUTO: 6.84 THOUSANDS/ΜL (ref 1.85–7.62)
NEUTS SEG NFR BLD AUTO: 69 % (ref 43–75)
NITRITE UR QL STRIP: POSITIVE
NON-SQ EPI CELLS URNS QL MICRO: ABNORMAL /HPF
NRBC BLD AUTO-RTO: 0 /100 WBCS
PH UR STRIP.AUTO: 6.5 [PH]
PLATELET # BLD AUTO: 149 THOUSANDS/UL (ref 149–390)
PMV BLD AUTO: 10.8 FL (ref 8.9–12.7)
POTASSIUM SERPL-SCNC: 5.1 MMOL/L (ref 3.5–5.3)
PROT UR STRIP-MCNC: >=300 MG/DL
PROTHROMBIN TIME: 13 SECONDS (ref 11.6–14.5)
RBC # BLD AUTO: 3.47 MILLION/UL (ref 3.88–5.62)
RBC #/AREA URNS AUTO: ABNORMAL /HPF
SODIUM SERPL-SCNC: 139 MMOL/L (ref 136–145)
SP GR UR STRIP.AUTO: 1.02 (ref 1–1.03)
UROBILINOGEN UR QL STRIP.AUTO: 4 E.U./DL
WBC # BLD AUTO: 10.04 THOUSAND/UL (ref 4.31–10.16)
WBC #/AREA URNS AUTO: ABNORMAL /HPF

## 2021-10-19 PROCEDURE — 85610 PROTHROMBIN TIME: CPT | Performed by: EMERGENCY MEDICINE

## 2021-10-19 PROCEDURE — 99222 1ST HOSP IP/OBS MODERATE 55: CPT | Performed by: PHYSICIAN ASSISTANT

## 2021-10-19 PROCEDURE — 36415 COLL VENOUS BLD VENIPUNCTURE: CPT | Performed by: EMERGENCY MEDICINE

## 2021-10-19 PROCEDURE — 80048 BASIC METABOLIC PNL TOTAL CA: CPT | Performed by: EMERGENCY MEDICINE

## 2021-10-19 PROCEDURE — 81001 URINALYSIS AUTO W/SCOPE: CPT | Performed by: EMERGENCY MEDICINE

## 2021-10-19 PROCEDURE — 99284 EMERGENCY DEPT VISIT MOD MDM: CPT

## 2021-10-19 PROCEDURE — 82948 REAGENT STRIP/BLOOD GLUCOSE: CPT

## 2021-10-19 PROCEDURE — 85730 THROMBOPLASTIN TIME PARTIAL: CPT | Performed by: EMERGENCY MEDICINE

## 2021-10-19 PROCEDURE — 85025 COMPLETE CBC W/AUTO DIFF WBC: CPT | Performed by: EMERGENCY MEDICINE

## 2021-10-19 PROCEDURE — 99284 EMERGENCY DEPT VISIT MOD MDM: CPT | Performed by: EMERGENCY MEDICINE

## 2021-10-19 PROCEDURE — 99223 1ST HOSP IP/OBS HIGH 75: CPT | Performed by: STUDENT IN AN ORGANIZED HEALTH CARE EDUCATION/TRAINING PROGRAM

## 2021-10-19 RX ORDER — AZELASTINE 1 MG/ML
2 SPRAY, METERED NASAL 2 TIMES DAILY PRN
Status: DISCONTINUED | OUTPATIENT
Start: 2021-10-19 | End: 2021-10-20 | Stop reason: HOSPADM

## 2021-10-19 RX ORDER — CALCITRIOL 0.25 UG/1
0.25 CAPSULE, LIQUID FILLED ORAL DAILY
Status: DISCONTINUED | OUTPATIENT
Start: 2021-10-19 | End: 2021-10-20 | Stop reason: HOSPADM

## 2021-10-19 RX ORDER — DULOXETIN HYDROCHLORIDE 20 MG/1
20 CAPSULE, DELAYED RELEASE ORAL
Status: DISCONTINUED | OUTPATIENT
Start: 2021-10-19 | End: 2021-10-20 | Stop reason: HOSPADM

## 2021-10-19 RX ORDER — ACETAMINOPHEN 325 MG/1
650 TABLET ORAL EVERY 6 HOURS PRN
Status: DISCONTINUED | OUTPATIENT
Start: 2021-10-19 | End: 2021-10-20 | Stop reason: HOSPADM

## 2021-10-19 RX ORDER — ONDANSETRON 2 MG/ML
4 INJECTION INTRAMUSCULAR; INTRAVENOUS EVERY 6 HOURS PRN
Status: DISCONTINUED | OUTPATIENT
Start: 2021-10-19 | End: 2021-10-20 | Stop reason: HOSPADM

## 2021-10-19 RX ORDER — EZETIMIBE 10 MG/1
10 TABLET ORAL
Status: DISCONTINUED | OUTPATIENT
Start: 2021-10-19 | End: 2021-10-20 | Stop reason: HOSPADM

## 2021-10-19 RX ORDER — LIDOCAINE HYDROCHLORIDE 20 MG/ML
1 JELLY TOPICAL ONCE
Status: COMPLETED | OUTPATIENT
Start: 2021-10-19 | End: 2021-10-19

## 2021-10-19 RX ORDER — PANTOPRAZOLE SODIUM 20 MG/1
20 TABLET, DELAYED RELEASE ORAL
Status: DISCONTINUED | OUTPATIENT
Start: 2021-10-19 | End: 2021-10-20 | Stop reason: HOSPADM

## 2021-10-19 RX ORDER — ISOSORBIDE MONONITRATE 30 MG/1
30 TABLET, EXTENDED RELEASE ORAL
Status: DISCONTINUED | OUTPATIENT
Start: 2021-10-19 | End: 2021-10-20 | Stop reason: HOSPADM

## 2021-10-19 RX ORDER — METOPROLOL SUCCINATE 50 MG/1
100 TABLET, EXTENDED RELEASE ORAL DAILY
Status: DISCONTINUED | OUTPATIENT
Start: 2021-10-19 | End: 2021-10-20 | Stop reason: HOSPADM

## 2021-10-19 RX ORDER — ALPRAZOLAM 0.25 MG/1
0.25 TABLET ORAL 2 TIMES DAILY PRN
Status: DISCONTINUED | OUTPATIENT
Start: 2021-10-19 | End: 2021-10-20 | Stop reason: HOSPADM

## 2021-10-19 RX ORDER — GABAPENTIN 300 MG/1
300 CAPSULE ORAL
Status: DISCONTINUED | OUTPATIENT
Start: 2021-10-19 | End: 2021-10-20 | Stop reason: HOSPADM

## 2021-10-19 RX ORDER — INSULIN GLARGINE 100 [IU]/ML
18 INJECTION, SOLUTION SUBCUTANEOUS
Status: DISCONTINUED | OUTPATIENT
Start: 2021-10-19 | End: 2021-10-20 | Stop reason: HOSPADM

## 2021-10-19 RX ORDER — MAGNESIUM HYDROXIDE/ALUMINUM HYDROXICE/SIMETHICONE 120; 1200; 1200 MG/30ML; MG/30ML; MG/30ML
30 SUSPENSION ORAL EVERY 6 HOURS PRN
Status: DISCONTINUED | OUTPATIENT
Start: 2021-10-19 | End: 2021-10-20 | Stop reason: HOSPADM

## 2021-10-19 RX ADMIN — SERTRALINE HYDROCHLORIDE 50 MG: 50 TABLET ORAL at 06:38

## 2021-10-19 RX ADMIN — BIMATOPROST 1 DROP: 0.1 SOLUTION/ DROPS OPHTHALMIC at 22:13

## 2021-10-19 RX ADMIN — INSULIN GLARGINE 18 UNITS: 100 INJECTION, SOLUTION SUBCUTANEOUS at 21:39

## 2021-10-19 RX ADMIN — INSULIN LISPRO 3 UNITS: 100 INJECTION, SOLUTION INTRAVENOUS; SUBCUTANEOUS at 18:53

## 2021-10-19 RX ADMIN — METOPROLOL SUCCINATE 100 MG: 50 TABLET, EXTENDED RELEASE ORAL at 10:10

## 2021-10-19 RX ADMIN — DULOXETINE HYDROCHLORIDE 20 MG: 20 CAPSULE, DELAYED RELEASE ORAL at 10:10

## 2021-10-19 RX ADMIN — ISOSORBIDE MONONITRATE 30 MG: 30 TABLET, EXTENDED RELEASE ORAL at 10:10

## 2021-10-19 RX ADMIN — CALCITRIOL CAPSULES 0.25 MCG 0.25 MCG: 0.25 CAPSULE ORAL at 10:09

## 2021-10-19 RX ADMIN — EZETIMIBE 10 MG: 10 TABLET ORAL at 10:09

## 2021-10-19 RX ADMIN — CEFTRIAXONE SODIUM 1000 MG: 10 INJECTION, POWDER, FOR SOLUTION INTRAVENOUS at 06:43

## 2021-10-19 RX ADMIN — PANTOPRAZOLE SODIUM 20 MG: 20 TABLET, DELAYED RELEASE ORAL at 06:38

## 2021-10-19 RX ADMIN — LIDOCAINE HYDROCHLORIDE 1 APPLICATION: 20 JELLY TOPICAL at 05:47

## 2021-10-19 RX ADMIN — GABAPENTIN 300 MG: 300 CAPSULE ORAL at 21:39

## 2021-10-20 ENCOUNTER — HOSPITAL ENCOUNTER (OUTPATIENT)
Facility: HOSPITAL | Age: 76
Setting detail: OBSERVATION
Discharge: HOME | DRG: 908 | End: 2021-10-22
Attending: EMERGENCY MEDICINE | Admitting: INTERNAL MEDICINE
Payer: MEDICARE

## 2021-10-20 VITALS
WEIGHT: 231.48 LBS | SYSTOLIC BLOOD PRESSURE: 130 MMHG | OXYGEN SATURATION: 98 % | TEMPERATURE: 98 F | RESPIRATION RATE: 18 BRPM | HEIGHT: 75 IN | HEART RATE: 59 BPM | DIASTOLIC BLOOD PRESSURE: 75 MMHG | BODY MASS INDEX: 28.78 KG/M2

## 2021-10-20 DIAGNOSIS — R31.9 HEMATURIA: ICD-10-CM

## 2021-10-20 DIAGNOSIS — R33.9 URINARY RETENTION: Primary | ICD-10-CM

## 2021-10-20 DIAGNOSIS — N30.01 ACUTE CYSTITIS WITH HEMATURIA: ICD-10-CM

## 2021-10-20 LAB
ANION GAP SERPL CALCULATED.3IONS-SCNC: 8 MMOL/L (ref 4–13)
BASOPHILS # BLD AUTO: 0.04 THOUSANDS/ΜL (ref 0–0.1)
BASOPHILS NFR BLD AUTO: 1 % (ref 0–1)
BUN SERPL-MCNC: 39 MG/DL (ref 5–25)
CALCIUM SERPL-MCNC: 8.4 MG/DL (ref 8.3–10.1)
CHLORIDE SERPL-SCNC: 106 MMOL/L (ref 100–108)
CO2 SERPL-SCNC: 26 MMOL/L (ref 21–32)
CREAT SERPL-MCNC: 2.68 MG/DL (ref 0.6–1.3)
EOSINOPHIL # BLD AUTO: 0.26 THOUSAND/ΜL (ref 0–0.61)
EOSINOPHIL NFR BLD AUTO: 3 % (ref 0–6)
ERYTHROCYTE [DISTWIDTH] IN BLOOD BY AUTOMATED COUNT: 13 % (ref 11.6–15.1)
GFR SERPL CREATININE-BSD FRML MDRD: 25 ML/MIN/1.73SQ M
GLUCOSE SERPL-MCNC: 127 MG/DL (ref 65–140)
GLUCOSE SERPL-MCNC: 141 MG/DL (ref 65–140)
GLUCOSE SERPL-MCNC: 149 MG/DL (ref 65–140)
GLUCOSE SERPL-MCNC: 257 MG/DL (ref 65–140)
HCT VFR BLD AUTO: 30.7 % (ref 36.5–49.3)
HGB BLD-MCNC: 10.4 G/DL (ref 12–17)
IMM GRANULOCYTES # BLD AUTO: 0.04 THOUSAND/UL (ref 0–0.2)
IMM GRANULOCYTES NFR BLD AUTO: 1 % (ref 0–2)
LYMPHOCYTES # BLD AUTO: 2.25 THOUSANDS/ΜL (ref 0.6–4.47)
LYMPHOCYTES NFR BLD AUTO: 27 % (ref 14–44)
MCH RBC QN AUTO: 32 PG (ref 26.8–34.3)
MCHC RBC AUTO-ENTMCNC: 33.9 G/DL (ref 31.4–37.4)
MCV RBC AUTO: 95 FL (ref 82–98)
MONOCYTES # BLD AUTO: 0.63 THOUSAND/ΜL (ref 0.17–1.22)
MONOCYTES NFR BLD AUTO: 8 % (ref 4–12)
NEUTROPHILS # BLD AUTO: 5.1 THOUSANDS/ΜL (ref 1.85–7.62)
NEUTS SEG NFR BLD AUTO: 60 % (ref 43–75)
NRBC BLD AUTO-RTO: 0 /100 WBCS
PLATELET # BLD AUTO: 137 THOUSANDS/UL (ref 149–390)
PMV BLD AUTO: 11.2 FL (ref 8.9–12.7)
POTASSIUM SERPL-SCNC: 4.9 MMOL/L (ref 3.5–5.3)
RBC # BLD AUTO: 3.25 MILLION/UL (ref 3.88–5.62)
SODIUM SERPL-SCNC: 140 MMOL/L (ref 136–145)
WBC # BLD AUTO: 8.32 THOUSAND/UL (ref 4.31–10.16)

## 2021-10-20 PROCEDURE — 82948 REAGENT STRIP/BLOOD GLUCOSE: CPT

## 2021-10-20 PROCEDURE — 80048 BASIC METABOLIC PNL TOTAL CA: CPT | Performed by: PHYSICIAN ASSISTANT

## 2021-10-20 PROCEDURE — 99285 EMERGENCY DEPT VISIT HI MDM: CPT | Performed by: EMERGENCY MEDICINE

## 2021-10-20 PROCEDURE — 85025 COMPLETE CBC W/AUTO DIFF WBC: CPT | Performed by: PHYSICIAN ASSISTANT

## 2021-10-20 PROCEDURE — NC001 PR NO CHARGE: Performed by: PHYSICIAN ASSISTANT

## 2021-10-20 PROCEDURE — 99284 EMERGENCY DEPT VISIT MOD MDM: CPT

## 2021-10-20 PROCEDURE — 99239 HOSP IP/OBS DSCHRG MGMT >30: CPT | Performed by: PHYSICIAN ASSISTANT

## 2021-10-20 PROCEDURE — 99232 SBSQ HOSP IP/OBS MODERATE 35: CPT | Performed by: PHYSICIAN ASSISTANT

## 2021-10-20 RX ORDER — ASPIRIN 81 MG/1
81 TABLET, CHEWABLE ORAL DAILY
Qty: 30 TABLET | Refills: 0
Start: 2021-10-23 | End: 2021-10-22

## 2021-10-20 RX ORDER — AMOXICILLIN 250 MG
1 CAPSULE ORAL 2 TIMES DAILY PRN
Status: DISCONTINUED | OUTPATIENT
Start: 2021-10-20 | End: 2021-10-20 | Stop reason: HOSPADM

## 2021-10-20 RX ORDER — SODIUM CHLORIDE 9 MG/ML
75 INJECTION, SOLUTION INTRAVENOUS CONTINUOUS
Status: DISCONTINUED | OUTPATIENT
Start: 2021-10-20 | End: 2021-10-20

## 2021-10-20 RX ADMIN — SERTRALINE HYDROCHLORIDE 50 MG: 50 TABLET ORAL at 05:35

## 2021-10-20 RX ADMIN — DULOXETINE HYDROCHLORIDE 20 MG: 20 CAPSULE, DELAYED RELEASE ORAL at 05:35

## 2021-10-20 RX ADMIN — EZETIMIBE 10 MG: 10 TABLET ORAL at 05:35

## 2021-10-20 RX ADMIN — INSULIN LISPRO 2 UNITS: 100 INJECTION, SOLUTION INTRAVENOUS; SUBCUTANEOUS at 12:52

## 2021-10-20 RX ADMIN — ISOSORBIDE MONONITRATE 30 MG: 30 TABLET, EXTENDED RELEASE ORAL at 05:35

## 2021-10-20 RX ADMIN — CEFTRIAXONE SODIUM 1000 MG: 10 INJECTION, POWDER, FOR SOLUTION INTRAVENOUS at 05:36

## 2021-10-20 RX ADMIN — METOPROLOL SUCCINATE 100 MG: 50 TABLET, EXTENDED RELEASE ORAL at 08:27

## 2021-10-20 RX ADMIN — PANTOPRAZOLE SODIUM 20 MG: 20 TABLET, DELAYED RELEASE ORAL at 05:35

## 2021-10-20 RX ADMIN — CALCITRIOL CAPSULES 0.25 MCG 0.25 MCG: 0.25 CAPSULE ORAL at 09:53

## 2021-10-21 ENCOUNTER — TRANSITIONAL CARE MANAGEMENT (OUTPATIENT)
Dept: INTERNAL MEDICINE CLINIC | Facility: CLINIC | Age: 76
End: 2021-10-21

## 2021-10-21 PROBLEM — R33.8 ACUTE URINARY RETENTION: Status: ACTIVE | Noted: 2021-10-21

## 2021-10-21 LAB
ALBUMIN SERPL BCP-MCNC: 3.5 G/DL (ref 3.5–5)
ALP SERPL-CCNC: 90 U/L (ref 46–116)
ALT SERPL W P-5'-P-CCNC: 48 U/L (ref 12–78)
ANION GAP SERPL CALCULATED.3IONS-SCNC: 9 MMOL/L (ref 4–13)
ANION GAP SERPL CALCULATED.3IONS-SCNC: 9 MMOL/L (ref 4–13)
APTT PPP: 29 SECONDS (ref 23–37)
AST SERPL W P-5'-P-CCNC: 31 U/L (ref 5–45)
BACTERIA UR QL AUTO: ABNORMAL /HPF
BASOPHILS # BLD AUTO: 0.03 THOUSANDS/ΜL (ref 0–0.1)
BASOPHILS # BLD AUTO: 0.04 THOUSANDS/ΜL (ref 0–0.1)
BASOPHILS NFR BLD AUTO: 0 % (ref 0–1)
BASOPHILS NFR BLD AUTO: 1 % (ref 0–1)
BILIRUB SERPL-MCNC: 0.31 MG/DL (ref 0.2–1)
BILIRUB UR QL STRIP: NEGATIVE
BUN SERPL-MCNC: 37 MG/DL (ref 5–25)
BUN SERPL-MCNC: 38 MG/DL (ref 5–25)
CALCIUM SERPL-MCNC: 8.6 MG/DL (ref 8.3–10.1)
CALCIUM SERPL-MCNC: 8.6 MG/DL (ref 8.3–10.1)
CHLORIDE SERPL-SCNC: 104 MMOL/L (ref 100–108)
CHLORIDE SERPL-SCNC: 105 MMOL/L (ref 100–108)
CLARITY UR: ABNORMAL
CO2 SERPL-SCNC: 25 MMOL/L (ref 21–32)
CO2 SERPL-SCNC: 26 MMOL/L (ref 21–32)
COLOR UR: ABNORMAL
CREAT SERPL-MCNC: 2.56 MG/DL (ref 0.6–1.3)
CREAT SERPL-MCNC: 2.74 MG/DL (ref 0.6–1.3)
EOSINOPHIL # BLD AUTO: 0.22 THOUSAND/ΜL (ref 0–0.61)
EOSINOPHIL # BLD AUTO: 0.29 THOUSAND/ΜL (ref 0–0.61)
EOSINOPHIL NFR BLD AUTO: 3 % (ref 0–6)
EOSINOPHIL NFR BLD AUTO: 3 % (ref 0–6)
ERYTHROCYTE [DISTWIDTH] IN BLOOD BY AUTOMATED COUNT: 13 % (ref 11.6–15.1)
ERYTHROCYTE [DISTWIDTH] IN BLOOD BY AUTOMATED COUNT: 13.1 % (ref 11.6–15.1)
GFR SERPL CREATININE-BSD FRML MDRD: 25 ML/MIN/1.73SQ M
GFR SERPL CREATININE-BSD FRML MDRD: 27 ML/MIN/1.73SQ M
GLUCOSE SERPL-MCNC: 129 MG/DL (ref 65–140)
GLUCOSE SERPL-MCNC: 132 MG/DL (ref 65–140)
GLUCOSE SERPL-MCNC: 138 MG/DL (ref 65–140)
GLUCOSE SERPL-MCNC: 144 MG/DL (ref 65–140)
GLUCOSE SERPL-MCNC: 152 MG/DL (ref 65–140)
GLUCOSE SERPL-MCNC: 213 MG/DL (ref 65–140)
GLUCOSE SERPL-MCNC: 242 MG/DL (ref 65–140)
GLUCOSE UR STRIP-MCNC: ABNORMAL MG/DL
HCT VFR BLD AUTO: 31.6 % (ref 36.5–49.3)
HCT VFR BLD AUTO: 32.7 % (ref 36.5–49.3)
HGB BLD-MCNC: 10.6 G/DL (ref 12–17)
HGB BLD-MCNC: 11.2 G/DL (ref 12–17)
HGB UR QL STRIP.AUTO: ABNORMAL
IMM GRANULOCYTES # BLD AUTO: 0.04 THOUSAND/UL (ref 0–0.2)
IMM GRANULOCYTES # BLD AUTO: 0.05 THOUSAND/UL (ref 0–0.2)
IMM GRANULOCYTES NFR BLD AUTO: 1 % (ref 0–2)
IMM GRANULOCYTES NFR BLD AUTO: 1 % (ref 0–2)
INR PPP: 1.03 (ref 0.84–1.19)
KETONES UR STRIP-MCNC: NEGATIVE MG/DL
LEUKOCYTE ESTERASE UR QL STRIP: ABNORMAL
LYMPHOCYTES # BLD AUTO: 2.27 THOUSANDS/ΜL (ref 0.6–4.47)
LYMPHOCYTES # BLD AUTO: 2.63 THOUSANDS/ΜL (ref 0.6–4.47)
LYMPHOCYTES NFR BLD AUTO: 23 % (ref 14–44)
LYMPHOCYTES NFR BLD AUTO: 32 % (ref 14–44)
MCH RBC QN AUTO: 31.5 PG (ref 26.8–34.3)
MCH RBC QN AUTO: 32.4 PG (ref 26.8–34.3)
MCHC RBC AUTO-ENTMCNC: 33.5 G/DL (ref 31.4–37.4)
MCHC RBC AUTO-ENTMCNC: 34.3 G/DL (ref 31.4–37.4)
MCV RBC AUTO: 94 FL (ref 82–98)
MCV RBC AUTO: 95 FL (ref 82–98)
MONOCYTES # BLD AUTO: 0.66 THOUSAND/ΜL (ref 0.17–1.22)
MONOCYTES # BLD AUTO: 0.84 THOUSAND/ΜL (ref 0.17–1.22)
MONOCYTES NFR BLD AUTO: 8 % (ref 4–12)
MONOCYTES NFR BLD AUTO: 8 % (ref 4–12)
NEUTROPHILS # BLD AUTO: 4.72 THOUSANDS/ΜL (ref 1.85–7.62)
NEUTROPHILS # BLD AUTO: 6.56 THOUSANDS/ΜL (ref 1.85–7.62)
NEUTS SEG NFR BLD AUTO: 55 % (ref 43–75)
NEUTS SEG NFR BLD AUTO: 65 % (ref 43–75)
NITRITE UR QL STRIP: NEGATIVE
NON-SQ EPI CELLS URNS QL MICRO: ABNORMAL /HPF
NRBC BLD AUTO-RTO: 0 /100 WBCS
NRBC BLD AUTO-RTO: 0 /100 WBCS
PH UR STRIP.AUTO: 5.5 [PH]
PLATELET # BLD AUTO: 142 THOUSANDS/UL (ref 149–390)
PLATELET # BLD AUTO: 155 THOUSANDS/UL (ref 149–390)
PMV BLD AUTO: 10.5 FL (ref 8.9–12.7)
PMV BLD AUTO: 11.2 FL (ref 8.9–12.7)
POTASSIUM SERPL-SCNC: 4.8 MMOL/L (ref 3.5–5.3)
POTASSIUM SERPL-SCNC: 4.8 MMOL/L (ref 3.5–5.3)
PROT SERPL-MCNC: 7.2 G/DL (ref 6.4–8.2)
PROT UR STRIP-MCNC: ABNORMAL MG/DL
PROTHROMBIN TIME: 13.2 SECONDS (ref 11.6–14.5)
RBC # BLD AUTO: 3.36 MILLION/UL (ref 3.88–5.62)
RBC # BLD AUTO: 3.46 MILLION/UL (ref 3.88–5.62)
RBC #/AREA URNS AUTO: ABNORMAL /HPF
SODIUM SERPL-SCNC: 139 MMOL/L (ref 136–145)
SODIUM SERPL-SCNC: 139 MMOL/L (ref 136–145)
SP GR UR STRIP.AUTO: 1.02 (ref 1–1.03)
UROBILINOGEN UR QL STRIP.AUTO: 0.2 E.U./DL
WBC # BLD AUTO: 10.04 THOUSAND/UL (ref 4.31–10.16)
WBC # BLD AUTO: 8.31 THOUSAND/UL (ref 4.31–10.16)
WBC #/AREA URNS AUTO: ABNORMAL /HPF

## 2021-10-21 PROCEDURE — 99214 OFFICE O/P EST MOD 30 MIN: CPT | Performed by: PHYSICIAN ASSISTANT

## 2021-10-21 PROCEDURE — 80053 COMPREHEN METABOLIC PANEL: CPT | Performed by: EMERGENCY MEDICINE

## 2021-10-21 PROCEDURE — 82948 REAGENT STRIP/BLOOD GLUCOSE: CPT

## 2021-10-21 PROCEDURE — 85025 COMPLETE CBC W/AUTO DIFF WBC: CPT | Performed by: EMERGENCY MEDICINE

## 2021-10-21 PROCEDURE — 80048 BASIC METABOLIC PNL TOTAL CA: CPT | Performed by: NURSE PRACTITIONER

## 2021-10-21 PROCEDURE — 85025 COMPLETE CBC W/AUTO DIFF WBC: CPT | Performed by: NURSE PRACTITIONER

## 2021-10-21 PROCEDURE — 99220 PR INITIAL OBSERVATION CARE/DAY 70 MINUTES: CPT | Performed by: NURSE PRACTITIONER

## 2021-10-21 PROCEDURE — 85730 THROMBOPLASTIN TIME PARTIAL: CPT | Performed by: EMERGENCY MEDICINE

## 2021-10-21 PROCEDURE — 81001 URINALYSIS AUTO W/SCOPE: CPT | Performed by: EMERGENCY MEDICINE

## 2021-10-21 PROCEDURE — 36415 COLL VENOUS BLD VENIPUNCTURE: CPT | Performed by: EMERGENCY MEDICINE

## 2021-10-21 PROCEDURE — 85610 PROTHROMBIN TIME: CPT | Performed by: EMERGENCY MEDICINE

## 2021-10-21 RX ORDER — HYDROMORPHONE HCL/PF 1 MG/ML
0.5 SYRINGE (ML) INJECTION
Status: DISCONTINUED | OUTPATIENT
Start: 2021-10-21 | End: 2021-10-22 | Stop reason: HOSPADM

## 2021-10-21 RX ORDER — SIMETHICONE 80 MG
80 TABLET,CHEWABLE ORAL 4 TIMES DAILY PRN
Status: DISCONTINUED | OUTPATIENT
Start: 2021-10-21 | End: 2021-10-22 | Stop reason: HOSPADM

## 2021-10-21 RX ORDER — ONDANSETRON 2 MG/ML
4 INJECTION INTRAMUSCULAR; INTRAVENOUS EVERY 6 HOURS PRN
Status: DISCONTINUED | OUTPATIENT
Start: 2021-10-21 | End: 2021-10-22 | Stop reason: HOSPADM

## 2021-10-21 RX ORDER — HYDROCODONE BITARTRATE AND ACETAMINOPHEN 5; 325 MG/1; MG/1
1 TABLET ORAL EVERY 6 HOURS PRN
Status: DISCONTINUED | OUTPATIENT
Start: 2021-10-21 | End: 2021-10-22 | Stop reason: HOSPADM

## 2021-10-21 RX ORDER — GABAPENTIN 300 MG/1
300 CAPSULE ORAL
Status: DISCONTINUED | OUTPATIENT
Start: 2021-10-21 | End: 2021-10-22 | Stop reason: HOSPADM

## 2021-10-21 RX ORDER — CALCIUM CARBONATE 200(500)MG
1000 TABLET,CHEWABLE ORAL DAILY PRN
Status: DISCONTINUED | OUTPATIENT
Start: 2021-10-21 | End: 2021-10-22 | Stop reason: HOSPADM

## 2021-10-21 RX ORDER — CALCITRIOL 0.25 UG/1
0.25 CAPSULE, LIQUID FILLED ORAL DAILY
Status: DISCONTINUED | OUTPATIENT
Start: 2021-10-21 | End: 2021-10-22 | Stop reason: HOSPADM

## 2021-10-21 RX ORDER — ALPRAZOLAM 0.25 MG/1
0.25 TABLET ORAL 2 TIMES DAILY PRN
Status: DISCONTINUED | OUTPATIENT
Start: 2021-10-21 | End: 2021-10-22 | Stop reason: HOSPADM

## 2021-10-21 RX ORDER — TAMSULOSIN HYDROCHLORIDE 0.4 MG/1
0.4 CAPSULE ORAL
Status: DISCONTINUED | OUTPATIENT
Start: 2021-10-21 | End: 2021-10-22 | Stop reason: HOSPADM

## 2021-10-21 RX ORDER — ISOSORBIDE MONONITRATE 30 MG/1
30 TABLET, EXTENDED RELEASE ORAL
Status: DISCONTINUED | OUTPATIENT
Start: 2021-10-21 | End: 2021-10-22 | Stop reason: HOSPADM

## 2021-10-21 RX ORDER — DULOXETIN HYDROCHLORIDE 20 MG/1
20 CAPSULE, DELAYED RELEASE ORAL
Status: DISCONTINUED | OUTPATIENT
Start: 2021-10-21 | End: 2021-10-22 | Stop reason: HOSPADM

## 2021-10-21 RX ORDER — ACETAMINOPHEN 325 MG/1
650 TABLET ORAL EVERY 6 HOURS PRN
Status: DISCONTINUED | OUTPATIENT
Start: 2021-10-21 | End: 2021-10-22 | Stop reason: HOSPADM

## 2021-10-21 RX ORDER — METOPROLOL SUCCINATE 50 MG/1
100 TABLET, EXTENDED RELEASE ORAL DAILY
Status: DISCONTINUED | OUTPATIENT
Start: 2021-10-21 | End: 2021-10-22 | Stop reason: HOSPADM

## 2021-10-21 RX ORDER — DOCUSATE SODIUM 100 MG/1
200 CAPSULE, LIQUID FILLED ORAL DAILY PRN
Status: DISCONTINUED | OUTPATIENT
Start: 2021-10-21 | End: 2021-10-22 | Stop reason: HOSPADM

## 2021-10-21 RX ORDER — INSULIN GLARGINE 100 [IU]/ML
15 INJECTION, SOLUTION SUBCUTANEOUS
Status: DISCONTINUED | OUTPATIENT
Start: 2021-10-21 | End: 2021-10-22 | Stop reason: HOSPADM

## 2021-10-21 RX ORDER — PANTOPRAZOLE SODIUM 20 MG/1
20 TABLET, DELAYED RELEASE ORAL
Status: DISCONTINUED | OUTPATIENT
Start: 2021-10-21 | End: 2021-10-22 | Stop reason: HOSPADM

## 2021-10-21 RX ORDER — CEFUROXIME AXETIL 250 MG/1
250 TABLET ORAL DAILY
Status: DISCONTINUED | OUTPATIENT
Start: 2021-10-21 | End: 2021-10-22 | Stop reason: HOSPADM

## 2021-10-21 RX ORDER — EZETIMIBE 10 MG/1
10 TABLET ORAL
Status: DISCONTINUED | OUTPATIENT
Start: 2021-10-21 | End: 2021-10-22 | Stop reason: HOSPADM

## 2021-10-21 RX ADMIN — ISOSORBIDE MONONITRATE 30 MG: 30 TABLET, EXTENDED RELEASE ORAL at 05:11

## 2021-10-21 RX ADMIN — METOPROLOL SUCCINATE 100 MG: 50 TABLET, EXTENDED RELEASE ORAL at 08:59

## 2021-10-21 RX ADMIN — EZETIMIBE 10 MG: 10 TABLET ORAL at 22:20

## 2021-10-21 RX ADMIN — PANTOPRAZOLE SODIUM 20 MG: 20 TABLET, DELAYED RELEASE ORAL at 05:10

## 2021-10-21 RX ADMIN — CEFUROXIME AXETIL 250 MG: 250 TABLET ORAL at 08:59

## 2021-10-21 RX ADMIN — BIMATOPROST 1 DROP: 0.1 SOLUTION/ DROPS OPHTHALMIC at 22:20

## 2021-10-21 RX ADMIN — HYDROMORPHONE HYDROCHLORIDE 0.5 MG: 1 INJECTION, SOLUTION INTRAMUSCULAR; INTRAVENOUS; SUBCUTANEOUS at 10:47

## 2021-10-21 RX ADMIN — CALCITRIOL CAPSULES 0.25 MCG 0.25 MCG: 0.25 CAPSULE ORAL at 08:59

## 2021-10-21 RX ADMIN — GABAPENTIN 300 MG: 300 CAPSULE ORAL at 22:21

## 2021-10-21 RX ADMIN — DULOXETINE HYDROCHLORIDE 20 MG: 20 CAPSULE, DELAYED RELEASE PELLETS ORAL at 05:10

## 2021-10-21 RX ADMIN — HYDROCODONE BITARTRATE AND ACETAMINOPHEN 1 TABLET: 5; 325 TABLET ORAL at 15:35

## 2021-10-21 RX ADMIN — HYDROCODONE BITARTRATE AND ACETAMINOPHEN 1 TABLET: 5; 325 TABLET ORAL at 05:10

## 2021-10-21 RX ADMIN — ACETAMINOPHEN 650 MG: 325 TABLET, FILM COATED ORAL at 19:47

## 2021-10-21 RX ADMIN — TAMSULOSIN HYDROCHLORIDE 0.4 MG: 0.4 CAPSULE ORAL at 22:22

## 2021-10-21 RX ADMIN — BIMATOPROST 1 DROP: 0.1 SOLUTION/ DROPS OPHTHALMIC at 05:13

## 2021-10-21 RX ADMIN — INSULIN LISPRO 1 UNITS: 100 INJECTION, SOLUTION INTRAVENOUS; SUBCUTANEOUS at 02:32

## 2021-10-21 RX ADMIN — INSULIN GLARGINE 15 UNITS: 100 INJECTION, SOLUTION SUBCUTANEOUS at 22:21

## 2021-10-21 RX ADMIN — INSULIN LISPRO 3 UNITS: 100 INJECTION, SOLUTION INTRAVENOUS; SUBCUTANEOUS at 17:31

## 2021-10-21 RX ADMIN — HYDROCODONE BITARTRATE AND ACETAMINOPHEN 1 TABLET: 5; 325 TABLET ORAL at 21:48

## 2021-10-22 ENCOUNTER — PROCEDURE VISIT (OUTPATIENT)
Dept: UROLOGY | Facility: MEDICAL CENTER | Age: 76
End: 2021-10-22

## 2021-10-22 VITALS
DIASTOLIC BLOOD PRESSURE: 76 MMHG | WEIGHT: 231 LBS | BODY MASS INDEX: 29.65 KG/M2 | SYSTOLIC BLOOD PRESSURE: 144 MMHG | HEART RATE: 74 BPM | HEIGHT: 74 IN

## 2021-10-22 VITALS
HEIGHT: 74 IN | OXYGEN SATURATION: 97 % | WEIGHT: 230.38 LBS | DIASTOLIC BLOOD PRESSURE: 75 MMHG | BODY MASS INDEX: 29.57 KG/M2 | RESPIRATION RATE: 16 BRPM | TEMPERATURE: 97.7 F | HEART RATE: 60 BPM | SYSTOLIC BLOOD PRESSURE: 117 MMHG

## 2021-10-22 DIAGNOSIS — R31.0 GROSS HEMATURIA: ICD-10-CM

## 2021-10-22 DIAGNOSIS — C67.8 MALIGNANT NEOPLASM OF OVERLAPPING SITES OF BLADDER (HCC): Primary | ICD-10-CM

## 2021-10-22 LAB — GLUCOSE SERPL-MCNC: 134 MG/DL (ref 65–140)

## 2021-10-22 PROCEDURE — 82948 REAGENT STRIP/BLOOD GLUCOSE: CPT

## 2021-10-22 PROCEDURE — 99211 OFF/OP EST MAY X REQ PHY/QHP: CPT

## 2021-10-22 PROCEDURE — 99217 PR OBSERVATION CARE DISCHARGE MANAGEMENT: CPT | Performed by: INTERNAL MEDICINE

## 2021-10-22 RX ADMIN — ISOSORBIDE MONONITRATE 30 MG: 30 TABLET, EXTENDED RELEASE ORAL at 05:20

## 2021-10-22 RX ADMIN — PANTOPRAZOLE SODIUM 20 MG: 20 TABLET, DELAYED RELEASE ORAL at 05:20

## 2021-10-22 RX ADMIN — INSULIN LISPRO 2 UNITS: 100 INJECTION, SOLUTION INTRAVENOUS; SUBCUTANEOUS at 00:02

## 2021-10-22 RX ADMIN — DULOXETINE HYDROCHLORIDE 20 MG: 20 CAPSULE, DELAYED RELEASE PELLETS ORAL at 05:20

## 2021-10-22 RX ADMIN — HYDROCODONE BITARTRATE AND ACETAMINOPHEN 1 TABLET: 5; 325 TABLET ORAL at 05:28

## 2021-10-24 ENCOUNTER — ANESTHESIA (INPATIENT)
Dept: PERIOP | Facility: HOSPITAL | Age: 76
DRG: 908 | End: 2021-10-24
Payer: MEDICARE

## 2021-10-24 ENCOUNTER — TELEPHONE (OUTPATIENT)
Dept: UROLOGY | Facility: CLINIC | Age: 76
End: 2021-10-24

## 2021-10-24 ENCOUNTER — HOSPITAL ENCOUNTER (INPATIENT)
Facility: HOSPITAL | Age: 76
LOS: 2 days | Discharge: HOME | DRG: 908 | End: 2021-10-26
Attending: EMERGENCY MEDICINE | Admitting: INTERNAL MEDICINE
Payer: MEDICARE

## 2021-10-24 ENCOUNTER — APPOINTMENT (INPATIENT)
Dept: RADIOLOGY | Facility: HOSPITAL | Age: 76
DRG: 908 | End: 2021-10-24
Payer: MEDICARE

## 2021-10-24 ENCOUNTER — ANESTHESIA EVENT (INPATIENT)
Dept: PERIOP | Facility: HOSPITAL | Age: 76
DRG: 908 | End: 2021-10-24
Payer: MEDICARE

## 2021-10-24 ENCOUNTER — NURSE TRIAGE (OUTPATIENT)
Dept: OTHER | Facility: OTHER | Age: 76
End: 2021-10-24

## 2021-10-24 DIAGNOSIS — J42 CHRONIC BRONCHITIS, UNSPECIFIED CHRONIC BRONCHITIS TYPE (HCC): ICD-10-CM

## 2021-10-24 DIAGNOSIS — R33.8 ACUTE URINARY RETENTION: ICD-10-CM

## 2021-10-24 DIAGNOSIS — R31.9 HEMATURIA: ICD-10-CM

## 2021-10-24 DIAGNOSIS — N32.89 BLADDER SPASMS: ICD-10-CM

## 2021-10-24 DIAGNOSIS — R33.8 CLOT RETENTION OF URINE: Primary | ICD-10-CM

## 2021-10-24 LAB
ABO GROUP BLD: NORMAL
ABO GROUP BLD: NORMAL
ALBUMIN SERPL BCP-MCNC: 3.3 G/DL (ref 3.5–5)
ALP SERPL-CCNC: 100 U/L (ref 46–116)
ALT SERPL W P-5'-P-CCNC: 86 U/L (ref 12–78)
ANION GAP SERPL CALCULATED.3IONS-SCNC: 4 MMOL/L (ref 4–13)
AST SERPL W P-5'-P-CCNC: 58 U/L (ref 5–45)
BACTERIA UR QL AUTO: ABNORMAL /HPF
BASOPHILS # BLD AUTO: 0.03 THOUSANDS/ΜL (ref 0–0.1)
BASOPHILS NFR BLD AUTO: 0 % (ref 0–1)
BILIRUB SERPL-MCNC: 0.31 MG/DL (ref 0.2–1)
BILIRUB UR QL STRIP: ABNORMAL
BLD GP AB SCN SERPL QL: NEGATIVE
BUN SERPL-MCNC: 47 MG/DL (ref 5–25)
CALCIUM ALBUM COR SERPL-MCNC: 9.4 MG/DL (ref 8.3–10.1)
CALCIUM SERPL-MCNC: 8.8 MG/DL (ref 8.3–10.1)
CHLORIDE SERPL-SCNC: 108 MMOL/L (ref 100–108)
CLARITY UR: ABNORMAL
CO2 SERPL-SCNC: 24 MMOL/L (ref 21–32)
COLOR UR: ABNORMAL
CREAT SERPL-MCNC: 3.19 MG/DL (ref 0.6–1.3)
EOSINOPHIL # BLD AUTO: 0.29 THOUSAND/ΜL (ref 0–0.61)
EOSINOPHIL NFR BLD AUTO: 4 % (ref 0–6)
ERYTHROCYTE [DISTWIDTH] IN BLOOD BY AUTOMATED COUNT: 13.2 % (ref 11.6–15.1)
GFR SERPL CREATININE-BSD FRML MDRD: 20 ML/MIN/1.73SQ M
GLUCOSE SERPL-MCNC: 105 MG/DL (ref 65–140)
GLUCOSE SERPL-MCNC: 114 MG/DL (ref 65–140)
GLUCOSE SERPL-MCNC: 73 MG/DL (ref 65–140)
GLUCOSE UR STRIP-MCNC: ABNORMAL MG/DL
HCT VFR BLD AUTO: 32.3 % (ref 36.5–49.3)
HGB BLD-MCNC: 10.8 G/DL (ref 12–17)
HGB UR QL STRIP.AUTO: ABNORMAL
IMM GRANULOCYTES # BLD AUTO: 0.04 THOUSAND/UL (ref 0–0.2)
IMM GRANULOCYTES NFR BLD AUTO: 1 % (ref 0–2)
KETONES UR STRIP-MCNC: ABNORMAL MG/DL
LEUKOCYTE ESTERASE UR QL STRIP: ABNORMAL
LYMPHOCYTES # BLD AUTO: 2.66 THOUSANDS/ΜL (ref 0.6–4.47)
LYMPHOCYTES NFR BLD AUTO: 33 % (ref 14–44)
MCH RBC QN AUTO: 31.5 PG (ref 26.8–34.3)
MCHC RBC AUTO-ENTMCNC: 33.4 G/DL (ref 31.4–37.4)
MCV RBC AUTO: 94 FL (ref 82–98)
MONOCYTES # BLD AUTO: 0.57 THOUSAND/ΜL (ref 0.17–1.22)
MONOCYTES NFR BLD AUTO: 7 % (ref 4–12)
NEUTROPHILS # BLD AUTO: 4.39 THOUSANDS/ΜL (ref 1.85–7.62)
NEUTS SEG NFR BLD AUTO: 55 % (ref 43–75)
NITRITE UR QL STRIP: ABNORMAL
NON-SQ EPI CELLS URNS QL MICRO: ABNORMAL /HPF
NRBC BLD AUTO-RTO: 0 /100 WBCS
PH UR STRIP.AUTO: 8 [PH]
PLATELET # BLD AUTO: 162 THOUSANDS/UL (ref 149–390)
PMV BLD AUTO: 10.5 FL (ref 8.9–12.7)
POTASSIUM SERPL-SCNC: 4.4 MMOL/L (ref 3.5–5.3)
PROT SERPL-MCNC: 7.1 G/DL (ref 6.4–8.2)
PROT UR STRIP-MCNC: ABNORMAL MG/DL
RBC # BLD AUTO: 3.43 MILLION/UL (ref 3.88–5.62)
RBC #/AREA URNS AUTO: ABNORMAL /HPF
RH BLD: POSITIVE
RH BLD: POSITIVE
SODIUM SERPL-SCNC: 136 MMOL/L (ref 136–145)
SP GR UR STRIP.AUTO: 1.01 (ref 1–1.03)
SPECIMEN EXPIRATION DATE: NORMAL
UROBILINOGEN UR QL STRIP.AUTO: ABNORMAL E.U./DL
WBC # BLD AUTO: 7.98 THOUSAND/UL (ref 4.31–10.16)
WBC #/AREA URNS AUTO: ABNORMAL /HPF

## 2021-10-24 PROCEDURE — 0T768DZ DILATION OF RIGHT URETER WITH INTRALUMINAL DEVICE, VIA NATURAL OR ARTIFICIAL OPENING ENDOSCOPIC: ICD-10-PCS | Performed by: UROLOGY

## 2021-10-24 PROCEDURE — 0T5B8ZZ DESTRUCTION OF BLADDER, VIA NATURAL OR ARTIFICIAL OPENING ENDOSCOPIC: ICD-10-PCS | Performed by: UROLOGY

## 2021-10-24 PROCEDURE — 82948 REAGENT STRIP/BLOOD GLUCOSE: CPT

## 2021-10-24 PROCEDURE — C1758 CATHETER, URETERAL: HCPCS | Performed by: UROLOGY

## 2021-10-24 PROCEDURE — 86850 RBC ANTIBODY SCREEN: CPT | Performed by: UROLOGY

## 2021-10-24 PROCEDURE — 87086 URINE CULTURE/COLONY COUNT: CPT | Performed by: EMERGENCY MEDICINE

## 2021-10-24 PROCEDURE — 99285 EMERGENCY DEPT VISIT HI MDM: CPT | Performed by: EMERGENCY MEDICINE

## 2021-10-24 PROCEDURE — 99223 1ST HOSP IP/OBS HIGH 75: CPT | Performed by: INTERNAL MEDICINE

## 2021-10-24 PROCEDURE — 36415 COLL VENOUS BLD VENIPUNCTURE: CPT

## 2021-10-24 PROCEDURE — 80053 COMPREHEN METABOLIC PANEL: CPT | Performed by: EMERGENCY MEDICINE

## 2021-10-24 PROCEDURE — 86900 BLOOD TYPING SEROLOGIC ABO: CPT | Performed by: UROLOGY

## 2021-10-24 PROCEDURE — 52224 CYSTOSCOPY AND TREATMENT: CPT | Performed by: UROLOGY

## 2021-10-24 PROCEDURE — 85025 COMPLETE CBC W/AUTO DIFF WBC: CPT | Performed by: EMERGENCY MEDICINE

## 2021-10-24 PROCEDURE — 0TCB8ZZ EXTIRPATION OF MATTER FROM BLADDER, VIA NATURAL OR ARTIFICIAL OPENING ENDOSCOPIC: ICD-10-PCS | Performed by: UROLOGY

## 2021-10-24 PROCEDURE — 52351 CYSTOURETERO & OR PYELOSCOPE: CPT | Performed by: UROLOGY

## 2021-10-24 PROCEDURE — 76775 US EXAM ABDO BACK WALL LIM: CPT | Performed by: EMERGENCY MEDICINE

## 2021-10-24 PROCEDURE — C2617 STENT, NON-COR, TEM W/O DEL: HCPCS | Performed by: UROLOGY

## 2021-10-24 PROCEDURE — 0T9B80Z DRAINAGE OF BLADDER WITH DRAINAGE DEVICE, VIA NATURAL OR ARTIFICIAL OPENING ENDOSCOPIC: ICD-10-PCS | Performed by: UROLOGY

## 2021-10-24 PROCEDURE — 74420 UROGRAPHY RTRGR +-KUB: CPT

## 2021-10-24 PROCEDURE — 99222 1ST HOSP IP/OBS MODERATE 55: CPT | Performed by: UROLOGY

## 2021-10-24 PROCEDURE — C1769 GUIDE WIRE: HCPCS | Performed by: UROLOGY

## 2021-10-24 PROCEDURE — BT1D1ZZ FLUOROSCOPY OF RIGHT KIDNEY, URETER AND BLADDER USING LOW OSMOLAR CONTRAST: ICD-10-PCS | Performed by: UROLOGY

## 2021-10-24 PROCEDURE — 81001 URINALYSIS AUTO W/SCOPE: CPT | Performed by: EMERGENCY MEDICINE

## 2021-10-24 PROCEDURE — 86901 BLOOD TYPING SEROLOGIC RH(D): CPT | Performed by: UROLOGY

## 2021-10-24 PROCEDURE — 52332 CYSTOSCOPY AND TREATMENT: CPT | Performed by: UROLOGY

## 2021-10-24 DEVICE — INLAY OPTIMA URETERAL STENT W/O GUIDEWIRE
Type: IMPLANTABLE DEVICE | Site: URETER | Status: FUNCTIONAL
Brand: BARD® INLAY OPTIMA® URETERAL STENT

## 2021-10-24 RX ORDER — HYDROMORPHONE HCL/PF 1 MG/ML
0.25 SYRINGE (ML) INJECTION
Status: DISCONTINUED | OUTPATIENT
Start: 2021-10-24 | End: 2021-10-24 | Stop reason: HOSPADM

## 2021-10-24 RX ORDER — INSULIN GLARGINE 100 [IU]/ML
15 INJECTION, SOLUTION SUBCUTANEOUS
Status: DISCONTINUED | OUTPATIENT
Start: 2021-10-24 | End: 2021-10-26 | Stop reason: HOSPADM

## 2021-10-24 RX ORDER — PANTOPRAZOLE SODIUM 40 MG/1
40 TABLET, DELAYED RELEASE ORAL
Status: DISCONTINUED | OUTPATIENT
Start: 2021-10-25 | End: 2021-10-26 | Stop reason: HOSPADM

## 2021-10-24 RX ORDER — DULOXETIN HYDROCHLORIDE 20 MG/1
20 CAPSULE, DELAYED RELEASE ORAL DAILY
Status: DISCONTINUED | OUTPATIENT
Start: 2021-10-25 | End: 2021-10-26 | Stop reason: HOSPADM

## 2021-10-24 RX ORDER — PROPOFOL 10 MG/ML
INJECTION, EMULSION INTRAVENOUS AS NEEDED
Status: DISCONTINUED | OUTPATIENT
Start: 2021-10-24 | End: 2021-10-24

## 2021-10-24 RX ORDER — EZETIMIBE 10 MG/1
10 TABLET ORAL
Status: DISCONTINUED | OUTPATIENT
Start: 2021-10-25 | End: 2021-10-26 | Stop reason: HOSPADM

## 2021-10-24 RX ORDER — ONDANSETRON 2 MG/ML
4 INJECTION INTRAMUSCULAR; INTRAVENOUS EVERY 6 HOURS PRN
Status: DISCONTINUED | OUTPATIENT
Start: 2021-10-24 | End: 2021-10-26 | Stop reason: HOSPADM

## 2021-10-24 RX ORDER — CEFUROXIME AXETIL 250 MG/1
250 TABLET ORAL DAILY
Status: DISCONTINUED | OUTPATIENT
Start: 2021-10-25 | End: 2021-10-26 | Stop reason: HOSPADM

## 2021-10-24 RX ORDER — DOCUSATE SODIUM 100 MG/1
100 CAPSULE, LIQUID FILLED ORAL 2 TIMES DAILY
Status: DISCONTINUED | OUTPATIENT
Start: 2021-10-24 | End: 2021-10-26 | Stop reason: HOSPADM

## 2021-10-24 RX ORDER — LIDOCAINE HYDROCHLORIDE 10 MG/ML
INJECTION, SOLUTION EPIDURAL; INFILTRATION; INTRACAUDAL; PERINEURAL AS NEEDED
Status: DISCONTINUED | OUTPATIENT
Start: 2021-10-24 | End: 2021-10-24

## 2021-10-24 RX ORDER — SODIUM CHLORIDE 9 MG/ML
50 INJECTION, SOLUTION INTRAVENOUS CONTINUOUS
Status: DISCONTINUED | OUTPATIENT
Start: 2021-10-24 | End: 2021-10-25

## 2021-10-24 RX ORDER — FLUTICASONE PROPIONATE 50 MCG
1 SPRAY, SUSPENSION (ML) NASAL DAILY
Status: DISCONTINUED | OUTPATIENT
Start: 2021-10-25 | End: 2021-10-26 | Stop reason: HOSPADM

## 2021-10-24 RX ORDER — SODIUM CHLORIDE, SODIUM LACTATE, POTASSIUM CHLORIDE, CALCIUM CHLORIDE 600; 310; 30; 20 MG/100ML; MG/100ML; MG/100ML; MG/100ML
INJECTION, SOLUTION INTRAVENOUS CONTINUOUS PRN
Status: DISCONTINUED | OUTPATIENT
Start: 2021-10-24 | End: 2021-10-24

## 2021-10-24 RX ORDER — FENTANYL CITRATE/PF 50 MCG/ML
25 SYRINGE (ML) INJECTION
Status: COMPLETED | OUTPATIENT
Start: 2021-10-24 | End: 2021-10-24

## 2021-10-24 RX ORDER — METOPROLOL SUCCINATE 100 MG/1
100 TABLET, EXTENDED RELEASE ORAL DAILY
Status: DISCONTINUED | OUTPATIENT
Start: 2021-10-25 | End: 2021-10-26 | Stop reason: HOSPADM

## 2021-10-24 RX ORDER — ISOSORBIDE MONONITRATE 30 MG/1
30 TABLET, EXTENDED RELEASE ORAL
Status: DISCONTINUED | OUTPATIENT
Start: 2021-10-25 | End: 2021-10-26 | Stop reason: HOSPADM

## 2021-10-24 RX ORDER — ALPRAZOLAM 0.25 MG/1
0.25 TABLET ORAL 2 TIMES DAILY PRN
Status: DISCONTINUED | OUTPATIENT
Start: 2021-10-24 | End: 2021-10-26 | Stop reason: HOSPADM

## 2021-10-24 RX ORDER — ONDANSETRON 2 MG/ML
4 INJECTION INTRAMUSCULAR; INTRAVENOUS ONCE AS NEEDED
Status: DISCONTINUED | OUTPATIENT
Start: 2021-10-24 | End: 2021-10-24 | Stop reason: HOSPADM

## 2021-10-24 RX ORDER — TAMSULOSIN HYDROCHLORIDE 0.4 MG/1
0.4 CAPSULE ORAL
Status: DISCONTINUED | OUTPATIENT
Start: 2021-10-24 | End: 2021-10-26 | Stop reason: HOSPADM

## 2021-10-24 RX ORDER — CEFAZOLIN SODIUM 1 G/3ML
INJECTION, POWDER, FOR SOLUTION INTRAMUSCULAR; INTRAVENOUS AS NEEDED
Status: DISCONTINUED | OUTPATIENT
Start: 2021-10-24 | End: 2021-10-24

## 2021-10-24 RX ORDER — CALCITRIOL 0.25 UG/1
0.25 CAPSULE, LIQUID FILLED ORAL DAILY
Status: DISCONTINUED | OUTPATIENT
Start: 2021-10-25 | End: 2021-10-26 | Stop reason: HOSPADM

## 2021-10-24 RX ORDER — ONDANSETRON 2 MG/ML
INJECTION INTRAMUSCULAR; INTRAVENOUS AS NEEDED
Status: DISCONTINUED | OUTPATIENT
Start: 2021-10-24 | End: 2021-10-24

## 2021-10-24 RX ORDER — ACETAMINOPHEN 325 MG/1
650 TABLET ORAL EVERY 6 HOURS PRN
Status: DISCONTINUED | OUTPATIENT
Start: 2021-10-24 | End: 2021-10-26 | Stop reason: HOSPADM

## 2021-10-24 RX ORDER — MAGNESIUM HYDROXIDE 1200 MG/15ML
LIQUID ORAL AS NEEDED
Status: DISCONTINUED | OUTPATIENT
Start: 2021-10-24 | End: 2021-10-24 | Stop reason: HOSPADM

## 2021-10-24 RX ADMIN — Medication 25 MCG: at 20:53

## 2021-10-24 RX ADMIN — Medication 25 MCG: at 20:46

## 2021-10-24 RX ADMIN — SODIUM CHLORIDE 50 ML/HR: 0.9 INJECTION, SOLUTION INTRAVENOUS at 20:30

## 2021-10-24 RX ADMIN — SODIUM CHLORIDE, SODIUM LACTATE, POTASSIUM CHLORIDE, AND CALCIUM CHLORIDE: .6; .31; .03; .02 INJECTION, SOLUTION INTRAVENOUS at 19:01

## 2021-10-24 RX ADMIN — DOCUSATE SODIUM 100 MG: 100 CAPSULE ORAL at 23:29

## 2021-10-24 RX ADMIN — Medication 25 MCG: at 20:25

## 2021-10-24 RX ADMIN — ONDANSETRON 4 MG: 2 INJECTION INTRAMUSCULAR; INTRAVENOUS at 19:26

## 2021-10-24 RX ADMIN — LIDOCAINE HYDROCHLORIDE 50 MG: 10 INJECTION, SOLUTION EPIDURAL; INFILTRATION; INTRACAUDAL; PERINEURAL at 19:09

## 2021-10-24 RX ADMIN — HYDROMORPHONE HYDROCHLORIDE 0.25 MG: 1 INJECTION, SOLUTION INTRAMUSCULAR; INTRAVENOUS; SUBCUTANEOUS at 21:26

## 2021-10-24 RX ADMIN — CEFAZOLIN 500 MG: 1 INJECTION, POWDER, FOR SOLUTION INTRAMUSCULAR; INTRAVENOUS at 19:24

## 2021-10-24 RX ADMIN — HYDROMORPHONE HYDROCHLORIDE 0.25 MG: 1 INJECTION, SOLUTION INTRAMUSCULAR; INTRAVENOUS; SUBCUTANEOUS at 21:31

## 2021-10-24 RX ADMIN — INSULIN GLARGINE 15 UNITS: 100 INJECTION, SOLUTION SUBCUTANEOUS at 23:29

## 2021-10-24 RX ADMIN — TAMSULOSIN HYDROCHLORIDE 0.4 MG: 0.4 CAPSULE ORAL at 23:30

## 2021-10-24 RX ADMIN — Medication 25 MCG: at 20:28

## 2021-10-24 RX ADMIN — PROPOFOL 170 MG: 10 INJECTION, EMULSION INTRAVENOUS at 19:09

## 2021-10-25 ENCOUNTER — TRANSITIONAL CARE MANAGEMENT (OUTPATIENT)
Dept: INTERNAL MEDICINE CLINIC | Facility: CLINIC | Age: 76
End: 2021-10-25

## 2021-10-25 LAB
ANION GAP SERPL CALCULATED.3IONS-SCNC: 5 MMOL/L (ref 4–13)
ATRIAL RATE: 74 BPM
BASOPHILS # BLD AUTO: 0.03 THOUSANDS/ΜL (ref 0–0.1)
BASOPHILS NFR BLD AUTO: 0 % (ref 0–1)
BUN SERPL-MCNC: 43 MG/DL (ref 5–25)
CALCIUM SERPL-MCNC: 8.4 MG/DL (ref 8.3–10.1)
CHLORIDE SERPL-SCNC: 108 MMOL/L (ref 100–108)
CO2 SERPL-SCNC: 25 MMOL/L (ref 21–32)
CREAT SERPL-MCNC: 2.83 MG/DL (ref 0.6–1.3)
EOSINOPHIL # BLD AUTO: 0.21 THOUSAND/ΜL (ref 0–0.61)
EOSINOPHIL NFR BLD AUTO: 3 % (ref 0–6)
ERYTHROCYTE [DISTWIDTH] IN BLOOD BY AUTOMATED COUNT: 13.2 % (ref 11.6–15.1)
GFR SERPL CREATININE-BSD FRML MDRD: 24 ML/MIN/1.73SQ M
GLUCOSE SERPL-MCNC: 163 MG/DL (ref 65–140)
GLUCOSE SERPL-MCNC: 175 MG/DL (ref 65–140)
GLUCOSE SERPL-MCNC: 194 MG/DL (ref 65–140)
GLUCOSE SERPL-MCNC: 197 MG/DL (ref 65–140)
GLUCOSE SERPL-MCNC: 229 MG/DL (ref 65–140)
HCT VFR BLD AUTO: 29.5 % (ref 36.5–49.3)
HGB BLD-MCNC: 9.9 G/DL (ref 12–17)
IMM GRANULOCYTES # BLD AUTO: 0.03 THOUSAND/UL (ref 0–0.2)
IMM GRANULOCYTES NFR BLD AUTO: 0 % (ref 0–2)
LYMPHOCYTES # BLD AUTO: 1.72 THOUSANDS/ΜL (ref 0.6–4.47)
LYMPHOCYTES NFR BLD AUTO: 26 % (ref 14–44)
MCH RBC QN AUTO: 31.9 PG (ref 26.8–34.3)
MCHC RBC AUTO-ENTMCNC: 33.6 G/DL (ref 31.4–37.4)
MCV RBC AUTO: 95 FL (ref 82–98)
MONOCYTES # BLD AUTO: 0.51 THOUSAND/ΜL (ref 0.17–1.22)
MONOCYTES NFR BLD AUTO: 8 % (ref 4–12)
NEUTROPHILS # BLD AUTO: 4.18 THOUSANDS/ΜL (ref 1.85–7.62)
NEUTS SEG NFR BLD AUTO: 63 % (ref 43–75)
NRBC BLD AUTO-RTO: 0 /100 WBCS
P AXIS: 50 DEGREES
PLATELET # BLD AUTO: 147 THOUSANDS/UL (ref 149–390)
PMV BLD AUTO: 11.1 FL (ref 8.9–12.7)
POTASSIUM SERPL-SCNC: 4.3 MMOL/L (ref 3.5–5.3)
PR INTERVAL: 206 MS
QRS AXIS: -19 DEGREES
QRSD INTERVAL: 118 MS
QT INTERVAL: 392 MS
QTC INTERVAL: 435 MS
RBC # BLD AUTO: 3.1 MILLION/UL (ref 3.88–5.62)
SODIUM SERPL-SCNC: 138 MMOL/L (ref 136–145)
T WAVE AXIS: 69 DEGREES
TROPONIN I SERPL-MCNC: <0.02 NG/ML
VENTRICULAR RATE: 74 BPM
WBC # BLD AUTO: 6.68 THOUSAND/UL (ref 4.31–10.16)

## 2021-10-25 PROCEDURE — 80048 BASIC METABOLIC PNL TOTAL CA: CPT | Performed by: INTERNAL MEDICINE

## 2021-10-25 PROCEDURE — 99232 SBSQ HOSP IP/OBS MODERATE 35: CPT | Performed by: STUDENT IN AN ORGANIZED HEALTH CARE EDUCATION/TRAINING PROGRAM

## 2021-10-25 PROCEDURE — 93005 ELECTROCARDIOGRAM TRACING: CPT

## 2021-10-25 PROCEDURE — 93010 ELECTROCARDIOGRAM REPORT: CPT | Performed by: INTERNAL MEDICINE

## 2021-10-25 PROCEDURE — 99232 SBSQ HOSP IP/OBS MODERATE 35: CPT | Performed by: PHYSICIAN ASSISTANT

## 2021-10-25 PROCEDURE — 84484 ASSAY OF TROPONIN QUANT: CPT | Performed by: STUDENT IN AN ORGANIZED HEALTH CARE EDUCATION/TRAINING PROGRAM

## 2021-10-25 PROCEDURE — 82948 REAGENT STRIP/BLOOD GLUCOSE: CPT

## 2021-10-25 PROCEDURE — 85025 COMPLETE CBC W/AUTO DIFF WBC: CPT | Performed by: INTERNAL MEDICINE

## 2021-10-25 RX ORDER — PHENAZOPYRIDINE HYDROCHLORIDE 100 MG/1
200 TABLET, FILM COATED ORAL 3 TIMES DAILY PRN
Status: DISCONTINUED | OUTPATIENT
Start: 2021-10-25 | End: 2021-10-26 | Stop reason: HOSPADM

## 2021-10-25 RX ORDER — OXYBUTYNIN CHLORIDE 5 MG/1
5 TABLET ORAL 3 TIMES DAILY
Status: DISCONTINUED | OUTPATIENT
Start: 2021-10-25 | End: 2021-10-26 | Stop reason: HOSPADM

## 2021-10-25 RX ADMIN — INSULIN LISPRO 10 UNITS: 100 INJECTION, SOLUTION INTRAVENOUS; SUBCUTANEOUS at 12:51

## 2021-10-25 RX ADMIN — ACETAMINOPHEN 650 MG: 325 TABLET, FILM COATED ORAL at 06:23

## 2021-10-25 RX ADMIN — INSULIN LISPRO 10 UNITS: 100 INJECTION, SOLUTION INTRAVENOUS; SUBCUTANEOUS at 08:30

## 2021-10-25 RX ADMIN — CEFUROXIME AXETIL 250 MG: 250 TABLET ORAL at 08:31

## 2021-10-25 RX ADMIN — ISOSORBIDE MONONITRATE 30 MG: 30 TABLET, EXTENDED RELEASE ORAL at 06:25

## 2021-10-25 RX ADMIN — OXYBUTYNIN CHLORIDE 5 MG: 5 TABLET ORAL at 21:26

## 2021-10-25 RX ADMIN — SODIUM CHLORIDE 50 ML/HR: 0.9 INJECTION, SOLUTION INTRAVENOUS at 18:05

## 2021-10-25 RX ADMIN — SODIUM CHLORIDE 50 ML/HR: 0.9 INJECTION, SOLUTION INTRAVENOUS at 07:30

## 2021-10-25 RX ADMIN — DULOXETINE 20 MG: 20 CAPSULE, DELAYED RELEASE ORAL at 08:31

## 2021-10-25 RX ADMIN — DOCUSATE SODIUM 100 MG: 100 CAPSULE ORAL at 18:11

## 2021-10-25 RX ADMIN — PHENAZOPYRIDINE 200 MG: 100 TABLET ORAL at 13:06

## 2021-10-25 RX ADMIN — ACETAMINOPHEN 650 MG: 325 TABLET, FILM COATED ORAL at 21:28

## 2021-10-25 RX ADMIN — TAMSULOSIN HYDROCHLORIDE 0.4 MG: 0.4 CAPSULE ORAL at 21:26

## 2021-10-25 RX ADMIN — PANTOPRAZOLE SODIUM 40 MG: 40 TABLET, DELAYED RELEASE ORAL at 06:24

## 2021-10-25 RX ADMIN — INSULIN LISPRO 1 UNITS: 100 INJECTION, SOLUTION INTRAVENOUS; SUBCUTANEOUS at 18:08

## 2021-10-25 RX ADMIN — METOPROLOL SUCCINATE 100 MG: 100 TABLET, EXTENDED RELEASE ORAL at 08:31

## 2021-10-25 RX ADMIN — CALCITRIOL CAPSULES 0.25 MCG 0.25 MCG: 0.25 CAPSULE ORAL at 08:31

## 2021-10-25 RX ADMIN — DOCUSATE SODIUM 100 MG: 100 CAPSULE ORAL at 08:31

## 2021-10-25 RX ADMIN — INSULIN LISPRO 10 UNITS: 100 INJECTION, SOLUTION INTRAVENOUS; SUBCUTANEOUS at 18:10

## 2021-10-25 RX ADMIN — INSULIN GLARGINE 15 UNITS: 100 INJECTION, SOLUTION SUBCUTANEOUS at 21:26

## 2021-10-25 RX ADMIN — INSULIN LISPRO 2 UNITS: 100 INJECTION, SOLUTION INTRAVENOUS; SUBCUTANEOUS at 12:50

## 2021-10-25 RX ADMIN — EZETIMIBE 10 MG: 10 TABLET ORAL at 06:25

## 2021-10-26 VITALS
HEART RATE: 76 BPM | SYSTOLIC BLOOD PRESSURE: 118 MMHG | HEIGHT: 74 IN | BODY MASS INDEX: 29.65 KG/M2 | TEMPERATURE: 98.5 F | DIASTOLIC BLOOD PRESSURE: 68 MMHG | OXYGEN SATURATION: 97 % | WEIGHT: 231 LBS | RESPIRATION RATE: 17 BRPM

## 2021-10-26 LAB
ALBUMIN SERPL BCP-MCNC: 2.8 G/DL (ref 3.5–5)
ANION GAP SERPL CALCULATED.3IONS-SCNC: 4 MMOL/L (ref 4–13)
BACTERIA UR CULT: NORMAL
BUN SERPL-MCNC: 36 MG/DL (ref 5–25)
CALCIUM ALBUM COR SERPL-MCNC: 9.6 MG/DL (ref 8.3–10.1)
CALCIUM SERPL-MCNC: 8.6 MG/DL (ref 8.3–10.1)
CHLORIDE SERPL-SCNC: 110 MMOL/L (ref 100–108)
CO2 SERPL-SCNC: 25 MMOL/L (ref 21–32)
CREAT SERPL-MCNC: 2.81 MG/DL (ref 0.6–1.3)
ERYTHROCYTE [DISTWIDTH] IN BLOOD BY AUTOMATED COUNT: 13.3 % (ref 11.6–15.1)
GFR SERPL CREATININE-BSD FRML MDRD: 24 ML/MIN/1.73SQ M
GLUCOSE SERPL-MCNC: 121 MG/DL (ref 65–140)
GLUCOSE SERPL-MCNC: 122 MG/DL (ref 65–140)
GLUCOSE SERPL-MCNC: 203 MG/DL (ref 65–140)
GLUCOSE SERPL-MCNC: 282 MG/DL (ref 65–140)
HCT VFR BLD AUTO: 29 % (ref 36.5–49.3)
HGB BLD-MCNC: 9.8 G/DL (ref 12–17)
MAGNESIUM SERPL-MCNC: 2.2 MG/DL (ref 1.6–2.6)
MCH RBC QN AUTO: 32 PG (ref 26.8–34.3)
MCHC RBC AUTO-ENTMCNC: 33.8 G/DL (ref 31.4–37.4)
MCV RBC AUTO: 95 FL (ref 82–98)
PHOSPHATE SERPL-MCNC: 3.5 MG/DL (ref 2.3–4.1)
PLATELET # BLD AUTO: 148 THOUSANDS/UL (ref 149–390)
PMV BLD AUTO: 10.6 FL (ref 8.9–12.7)
POTASSIUM SERPL-SCNC: 4.8 MMOL/L (ref 3.5–5.3)
RBC # BLD AUTO: 3.06 MILLION/UL (ref 3.88–5.62)
SODIUM SERPL-SCNC: 139 MMOL/L (ref 136–145)
WBC # BLD AUTO: 6.89 THOUSAND/UL (ref 4.31–10.16)

## 2021-10-26 PROCEDURE — 83735 ASSAY OF MAGNESIUM: CPT | Performed by: STUDENT IN AN ORGANIZED HEALTH CARE EDUCATION/TRAINING PROGRAM

## 2021-10-26 PROCEDURE — 99239 HOSP IP/OBS DSCHRG MGMT >30: CPT | Performed by: INTERNAL MEDICINE

## 2021-10-26 PROCEDURE — 85027 COMPLETE CBC AUTOMATED: CPT | Performed by: STUDENT IN AN ORGANIZED HEALTH CARE EDUCATION/TRAINING PROGRAM

## 2021-10-26 PROCEDURE — 82948 REAGENT STRIP/BLOOD GLUCOSE: CPT

## 2021-10-26 PROCEDURE — 80069 RENAL FUNCTION PANEL: CPT | Performed by: STUDENT IN AN ORGANIZED HEALTH CARE EDUCATION/TRAINING PROGRAM

## 2021-10-26 PROCEDURE — 99232 SBSQ HOSP IP/OBS MODERATE 35: CPT | Performed by: UROLOGY

## 2021-10-26 RX ORDER — PHENAZOPYRIDINE HYDROCHLORIDE 200 MG/1
200 TABLET, FILM COATED ORAL 3 TIMES DAILY PRN
Qty: 9 TABLET | Refills: 0 | Status: SHIPPED | OUTPATIENT
Start: 2021-10-26 | End: 2021-10-29

## 2021-10-26 RX ORDER — OXYBUTYNIN CHLORIDE 5 MG/1
5 TABLET ORAL 3 TIMES DAILY
Qty: 21 TABLET | Refills: 0 | Status: ON HOLD | OUTPATIENT
Start: 2021-10-26 | End: 2022-01-17 | Stop reason: ALTCHOICE

## 2021-10-26 RX ADMIN — EZETIMIBE 10 MG: 10 TABLET ORAL at 05:22

## 2021-10-26 RX ADMIN — ISOSORBIDE MONONITRATE 30 MG: 30 TABLET, EXTENDED RELEASE ORAL at 05:22

## 2021-10-26 RX ADMIN — CALCITRIOL CAPSULES 0.25 MCG 0.25 MCG: 0.25 CAPSULE ORAL at 09:34

## 2021-10-26 RX ADMIN — DOCUSATE SODIUM 100 MG: 100 CAPSULE ORAL at 09:34

## 2021-10-26 RX ADMIN — OXYBUTYNIN CHLORIDE 5 MG: 5 TABLET ORAL at 09:35

## 2021-10-26 RX ADMIN — PANTOPRAZOLE SODIUM 40 MG: 40 TABLET, DELAYED RELEASE ORAL at 05:22

## 2021-10-26 RX ADMIN — CEFUROXIME AXETIL 250 MG: 250 TABLET ORAL at 09:34

## 2021-10-26 RX ADMIN — INSULIN LISPRO 10 UNITS: 100 INJECTION, SOLUTION INTRAVENOUS; SUBCUTANEOUS at 09:35

## 2021-10-26 RX ADMIN — DULOXETINE 20 MG: 20 CAPSULE, DELAYED RELEASE ORAL at 09:34

## 2021-10-26 RX ADMIN — PHENAZOPYRIDINE 200 MG: 100 TABLET ORAL at 09:34

## 2021-10-26 RX ADMIN — METOPROLOL SUCCINATE 100 MG: 100 TABLET, EXTENDED RELEASE ORAL at 09:34

## 2021-10-27 ENCOUNTER — TRANSITIONAL CARE MANAGEMENT (OUTPATIENT)
Dept: INTERNAL MEDICINE CLINIC | Facility: CLINIC | Age: 76
End: 2021-10-27

## 2021-10-29 ENCOUNTER — PROCEDURE VISIT (OUTPATIENT)
Dept: UROLOGY | Facility: MEDICAL CENTER | Age: 76
End: 2021-10-29

## 2021-10-29 VITALS
DIASTOLIC BLOOD PRESSURE: 62 MMHG | HEIGHT: 74 IN | WEIGHT: 230 LBS | BODY MASS INDEX: 29.52 KG/M2 | SYSTOLIC BLOOD PRESSURE: 122 MMHG

## 2021-10-29 DIAGNOSIS — R31.0 GROSS HEMATURIA: ICD-10-CM

## 2021-10-29 DIAGNOSIS — C67.8 MALIGNANT NEOPLASM OF OVERLAPPING SITES OF BLADDER (HCC): Primary | ICD-10-CM

## 2021-10-29 PROCEDURE — 99024 POSTOP FOLLOW-UP VISIT: CPT

## 2021-11-01 ENCOUNTER — OFFICE VISIT (OUTPATIENT)
Dept: INTERNAL MEDICINE CLINIC | Facility: CLINIC | Age: 76
End: 2021-11-01
Payer: MEDICARE

## 2021-11-01 VITALS
OXYGEN SATURATION: 97 % | WEIGHT: 237 LBS | HEIGHT: 74 IN | TEMPERATURE: 96.2 F | BODY MASS INDEX: 30.42 KG/M2 | RESPIRATION RATE: 18 BRPM | HEART RATE: 77 BPM | DIASTOLIC BLOOD PRESSURE: 82 MMHG | SYSTOLIC BLOOD PRESSURE: 150 MMHG

## 2021-11-01 DIAGNOSIS — Z95.5 PRESENCE OF STENT IN CORONARY ARTERY: ICD-10-CM

## 2021-11-01 DIAGNOSIS — E55.9 VITAMIN D DEFICIENCY: ICD-10-CM

## 2021-11-01 DIAGNOSIS — E11.22 TYPE 2 DIABETES MELLITUS WITH STAGE 3 CHRONIC KIDNEY DISEASE, WITH LONG-TERM CURRENT USE OF INSULIN, UNSPECIFIED WHETHER STAGE 3A OR 3B CKD (HCC): Primary | ICD-10-CM

## 2021-11-01 DIAGNOSIS — E11.42 DIABETIC POLYNEUROPATHY ASSOCIATED WITH TYPE 2 DIABETES MELLITUS (HCC): ICD-10-CM

## 2021-11-01 DIAGNOSIS — D09.0 CARCINOMA IN SITU OF BLADDER: ICD-10-CM

## 2021-11-01 DIAGNOSIS — N18.30 TYPE 2 DIABETES MELLITUS WITH STAGE 3 CHRONIC KIDNEY DISEASE, WITH LONG-TERM CURRENT USE OF INSULIN, UNSPECIFIED WHETHER STAGE 3A OR 3B CKD (HCC): Primary | ICD-10-CM

## 2021-11-01 DIAGNOSIS — I25.118 CORONARY ARTERY DISEASE OF NATIVE ARTERY OF NATIVE HEART WITH STABLE ANGINA PECTORIS (HCC): ICD-10-CM

## 2021-11-01 DIAGNOSIS — E78.2 MIXED HYPERLIPIDEMIA: ICD-10-CM

## 2021-11-01 DIAGNOSIS — N18.4 CKD (CHRONIC KIDNEY DISEASE) STAGE 4, GFR 15-29 ML/MIN (HCC): ICD-10-CM

## 2021-11-01 DIAGNOSIS — Z79.4 TYPE 2 DIABETES MELLITUS WITH STAGE 3 CHRONIC KIDNEY DISEASE, WITH LONG-TERM CURRENT USE OF INSULIN, UNSPECIFIED WHETHER STAGE 3A OR 3B CKD (HCC): Primary | ICD-10-CM

## 2021-11-01 DIAGNOSIS — D50.9 IRON DEFICIENCY ANEMIA, UNSPECIFIED IRON DEFICIENCY ANEMIA TYPE: ICD-10-CM

## 2021-11-01 PROCEDURE — 99495 TRANSJ CARE MGMT MOD F2F 14D: CPT | Performed by: INTERNAL MEDICINE

## 2021-11-01 RX ORDER — ASPIRIN 81 MG/1
81 TABLET ORAL DAILY
Start: 2021-11-01

## 2021-11-01 RX ORDER — FERROUS SULFATE 325(65) MG
65 TABLET ORAL DAILY
Qty: 30 TABLET | Refills: 0 | Status: SHIPPED | OUTPATIENT
Start: 2021-11-01 | End: 2022-02-02 | Stop reason: SDUPTHER

## 2021-11-02 ENCOUNTER — DOCUMENTATION (OUTPATIENT)
Dept: UROLOGY | Facility: CLINIC | Age: 76
End: 2021-11-02

## 2021-11-04 ENCOUNTER — OFFICE VISIT (OUTPATIENT)
Dept: UROLOGY | Facility: MEDICAL CENTER | Age: 76
End: 2021-11-04
Payer: MEDICARE

## 2021-11-04 VITALS
HEIGHT: 74 IN | DIASTOLIC BLOOD PRESSURE: 86 MMHG | HEART RATE: 82 BPM | WEIGHT: 230.6 LBS | BODY MASS INDEX: 29.59 KG/M2 | SYSTOLIC BLOOD PRESSURE: 160 MMHG

## 2021-11-04 DIAGNOSIS — C67.3 MALIGNANT NEOPLASM OF ANTERIOR WALL OF URINARY BLADDER (HCC): ICD-10-CM

## 2021-11-04 DIAGNOSIS — R31.0 GROSS HEMATURIA: Primary | ICD-10-CM

## 2021-11-04 LAB
SL AMB  POCT GLUCOSE, UA: ABNORMAL
SL AMB LEUKOCYTE ESTERASE,UA: ABNORMAL
SL AMB POCT BILIRUBIN,UA: ABNORMAL
SL AMB POCT BLOOD,UA: ABNORMAL
SL AMB POCT CLARITY,UA: ABNORMAL
SL AMB POCT COLOR,UA: ABNORMAL
SL AMB POCT KETONES,UA: ABNORMAL
SL AMB POCT NITRITE,UA: ABNORMAL
SL AMB POCT PH,UA: 5.5
SL AMB POCT SPECIFIC GRAVITY,UA: 1.02
SL AMB POCT URINE PROTEIN: 300
SL AMB POCT UROBILINOGEN: 0.2

## 2021-11-04 PROCEDURE — 81002 URINALYSIS NONAUTO W/O SCOPE: CPT | Performed by: UROLOGY

## 2021-11-04 PROCEDURE — 99214 OFFICE O/P EST MOD 30 MIN: CPT | Performed by: UROLOGY

## 2021-11-05 ENCOUNTER — TELEPHONE (OUTPATIENT)
Dept: HEMATOLOGY ONCOLOGY | Facility: CLINIC | Age: 76
End: 2021-11-05

## 2021-11-05 ENCOUNTER — PATIENT OUTREACH (OUTPATIENT)
Dept: UROLOGY | Facility: CLINIC | Age: 76
End: 2021-11-05

## 2021-11-10 DIAGNOSIS — I25.10 CORONARY ARTERY DISEASE INVOLVING NATIVE HEART WITHOUT ANGINA PECTORIS, UNSPECIFIED VESSEL OR LESION TYPE: ICD-10-CM

## 2021-11-10 RX ORDER — FUROSEMIDE 20 MG/1
TABLET ORAL
Qty: 90 TABLET | Refills: 1 | Status: SHIPPED | OUTPATIENT
Start: 2021-11-10 | End: 2022-05-10 | Stop reason: SDUPTHER

## 2021-11-11 ENCOUNTER — HOSPITAL ENCOUNTER (OUTPATIENT)
Dept: CT IMAGING | Facility: HOSPITAL | Age: 76
Discharge: HOME | End: 2021-11-11
Attending: UROLOGY
Payer: MEDICARE

## 2021-11-11 DIAGNOSIS — C67.3 MALIGNANT NEOPLASM OF ANTERIOR WALL OF URINARY BLADDER (HCC): ICD-10-CM

## 2021-11-11 PROCEDURE — G1004 CDSM NDSC: HCPCS

## 2021-11-11 PROCEDURE — 71250 CT THORAX DX C-: CPT

## 2021-11-11 PROCEDURE — 74176 CT ABD & PELVIS W/O CONTRAST: CPT

## 2021-11-17 ENCOUNTER — TELEPHONE (OUTPATIENT)
Dept: HEMATOLOGY ONCOLOGY | Facility: CLINIC | Age: 76
End: 2021-11-17

## 2021-11-17 ENCOUNTER — CONSULT (OUTPATIENT)
Dept: HEMATOLOGY ONCOLOGY | Facility: CLINIC | Age: 76
End: 2021-11-17
Payer: MEDICARE

## 2021-11-17 VITALS
DIASTOLIC BLOOD PRESSURE: 80 MMHG | HEART RATE: 69 BPM | OXYGEN SATURATION: 98 % | BODY MASS INDEX: 29.47 KG/M2 | WEIGHT: 242 LBS | TEMPERATURE: 97.8 F | HEIGHT: 76 IN | SYSTOLIC BLOOD PRESSURE: 126 MMHG | RESPIRATION RATE: 18 BRPM

## 2021-11-17 DIAGNOSIS — Z78.9 NEED FOR FOLLOW-UP BY SOCIAL WORKER: Primary | ICD-10-CM

## 2021-11-17 DIAGNOSIS — C67.3 MALIGNANT NEOPLASM OF ANTERIOR WALL OF URINARY BLADDER (HCC): ICD-10-CM

## 2021-11-17 PROCEDURE — 99205 OFFICE O/P NEW HI 60 MIN: CPT | Performed by: INTERNAL MEDICINE

## 2021-11-17 RX ORDER — MIRABEGRON 50 MG/1
TABLET, FILM COATED, EXTENDED RELEASE ORAL
Status: ON HOLD | COMMUNITY
Start: 2021-11-14 | End: 2022-01-17 | Stop reason: ALTCHOICE

## 2021-11-18 ENCOUNTER — RADIATION ONCOLOGY CONSULT (OUTPATIENT)
Dept: RADIATION ONCOLOGY | Facility: HOSPITAL | Age: 76
End: 2021-11-18
Attending: INTERNAL MEDICINE
Payer: MEDICARE

## 2021-11-18 VITALS
OXYGEN SATURATION: 97 % | HEIGHT: 76 IN | RESPIRATION RATE: 18 BRPM | HEART RATE: 75 BPM | SYSTOLIC BLOOD PRESSURE: 125 MMHG | DIASTOLIC BLOOD PRESSURE: 78 MMHG | TEMPERATURE: 96.9 F | WEIGHT: 239.2 LBS | BODY MASS INDEX: 29.13 KG/M2

## 2021-11-18 DIAGNOSIS — C67.3 MALIGNANT NEOPLASM OF ANTERIOR WALL OF URINARY BLADDER (HCC): ICD-10-CM

## 2021-11-18 DIAGNOSIS — D09.0 CARCINOMA IN SITU OF BLADDER: Primary | ICD-10-CM

## 2021-11-18 PROCEDURE — 99211 OFF/OP EST MAY X REQ PHY/QHP: CPT | Performed by: INTERNAL MEDICINE

## 2021-11-18 PROCEDURE — 99204 OFFICE O/P NEW MOD 45 MIN: CPT | Performed by: INTERNAL MEDICINE

## 2021-11-19 ENCOUNTER — PATIENT OUTREACH (OUTPATIENT)
Dept: CASE MANAGEMENT | Facility: OTHER | Age: 76
End: 2021-11-19

## 2021-11-23 ENCOUNTER — DOCUMENTATION (OUTPATIENT)
Dept: HEMATOLOGY ONCOLOGY | Facility: CLINIC | Age: 76
End: 2021-11-23

## 2021-11-26 ENCOUNTER — PATIENT OUTREACH (OUTPATIENT)
Dept: CASE MANAGEMENT | Facility: OTHER | Age: 76
End: 2021-11-26

## 2021-12-07 ENCOUNTER — TELEPHONE (OUTPATIENT)
Dept: RADIATION ONCOLOGY | Facility: CLINIC | Age: 76
End: 2021-12-07

## 2021-12-07 DIAGNOSIS — I25.118 CORONARY ARTERY DISEASE OF NATIVE ARTERY OF NATIVE HEART WITH STABLE ANGINA PECTORIS (HCC): Primary | ICD-10-CM

## 2021-12-07 RX ORDER — ISOSORBIDE MONONITRATE 30 MG/1
30 TABLET, EXTENDED RELEASE ORAL
Qty: 30 TABLET | Refills: 1 | Status: SHIPPED | OUTPATIENT
Start: 2021-12-07 | End: 2022-02-02 | Stop reason: SDUPTHER

## 2021-12-10 ENCOUNTER — NURSE TRIAGE (OUTPATIENT)
Dept: OTHER | Facility: OTHER | Age: 76
End: 2021-12-10

## 2021-12-10 ENCOUNTER — APPOINTMENT (OUTPATIENT)
Dept: LAB | Facility: CLINIC | Age: 76
End: 2021-12-10
Payer: MEDICARE

## 2021-12-10 DIAGNOSIS — E11.42 DIABETIC POLYNEUROPATHY ASSOCIATED WITH TYPE 2 DIABETES MELLITUS (HCC): ICD-10-CM

## 2021-12-10 DIAGNOSIS — N18.4 CKD (CHRONIC KIDNEY DISEASE) STAGE 4, GFR 15-29 ML/MIN (HCC): ICD-10-CM

## 2021-12-10 DIAGNOSIS — D50.9 IRON DEFICIENCY ANEMIA, UNSPECIFIED IRON DEFICIENCY ANEMIA TYPE: ICD-10-CM

## 2021-12-10 DIAGNOSIS — E55.9 VITAMIN D DEFICIENCY: ICD-10-CM

## 2021-12-10 DIAGNOSIS — R09.89 LEFT CAROTID BRUIT: ICD-10-CM

## 2021-12-10 DIAGNOSIS — Z79.4 TYPE 2 DIABETES MELLITUS WITH STAGE 3 CHRONIC KIDNEY DISEASE, WITH LONG-TERM CURRENT USE OF INSULIN, UNSPECIFIED WHETHER STAGE 3A OR 3B CKD (HCC): ICD-10-CM

## 2021-12-10 DIAGNOSIS — E11.22 TYPE 2 DIABETES MELLITUS WITH STAGE 3 CHRONIC KIDNEY DISEASE, WITH LONG-TERM CURRENT USE OF INSULIN, UNSPECIFIED WHETHER STAGE 3A OR 3B CKD (HCC): ICD-10-CM

## 2021-12-10 DIAGNOSIS — N18.30 TYPE 2 DIABETES MELLITUS WITH STAGE 3 CHRONIC KIDNEY DISEASE, WITH LONG-TERM CURRENT USE OF INSULIN, UNSPECIFIED WHETHER STAGE 3A OR 3B CKD (HCC): ICD-10-CM

## 2021-12-10 LAB
25(OH)D3 SERPL-MCNC: 28 NG/ML (ref 30–100)
ALBUMIN SERPL BCP-MCNC: 3.7 G/DL (ref 3.5–5)
ALP SERPL-CCNC: 116 U/L (ref 46–116)
ALT SERPL W P-5'-P-CCNC: 69 U/L (ref 12–78)
ANION GAP SERPL CALCULATED.3IONS-SCNC: 5 MMOL/L (ref 4–13)
AST SERPL W P-5'-P-CCNC: 44 U/L (ref 5–45)
BASOPHILS # BLD AUTO: 0.04 THOUSANDS/ΜL (ref 0–0.1)
BASOPHILS NFR BLD AUTO: 1 % (ref 0–1)
BILIRUB SERPL-MCNC: 0.38 MG/DL (ref 0.2–1)
BUN SERPL-MCNC: 41 MG/DL (ref 5–25)
CALCIUM SERPL-MCNC: 9.9 MG/DL (ref 8.3–10.1)
CHLORIDE SERPL-SCNC: 107 MMOL/L (ref 100–108)
CO2 SERPL-SCNC: 26 MMOL/L (ref 21–32)
CREAT SERPL-MCNC: 3.19 MG/DL (ref 0.6–1.3)
EOSINOPHIL # BLD AUTO: 0.24 THOUSAND/ΜL (ref 0–0.61)
EOSINOPHIL NFR BLD AUTO: 3 % (ref 0–6)
ERYTHROCYTE [DISTWIDTH] IN BLOOD BY AUTOMATED COUNT: 13.2 % (ref 11.6–15.1)
FERRITIN SERPL-MCNC: 23 NG/ML (ref 8–388)
GFR SERPL CREATININE-BSD FRML MDRD: 20 ML/MIN/1.73SQ M
GLUCOSE P FAST SERPL-MCNC: 164 MG/DL (ref 65–99)
HCT VFR BLD AUTO: 33.5 % (ref 36.5–49.3)
HGB BLD-MCNC: 10.9 G/DL (ref 12–17)
IMM GRANULOCYTES # BLD AUTO: 0.04 THOUSAND/UL (ref 0–0.2)
IMM GRANULOCYTES NFR BLD AUTO: 1 % (ref 0–2)
IRON SATN MFR SERPL: 49 % (ref 20–50)
IRON SERPL-MCNC: 186 UG/DL (ref 65–175)
LYMPHOCYTES # BLD AUTO: 1.83 THOUSANDS/ΜL (ref 0.6–4.47)
LYMPHOCYTES NFR BLD AUTO: 22 % (ref 14–44)
MAGNESIUM SERPL-MCNC: 2.1 MG/DL (ref 1.6–2.6)
MCH RBC QN AUTO: 31.1 PG (ref 26.8–34.3)
MCHC RBC AUTO-ENTMCNC: 32.5 G/DL (ref 31.4–37.4)
MCV RBC AUTO: 95 FL (ref 82–98)
MONOCYTES # BLD AUTO: 0.56 THOUSAND/ΜL (ref 0.17–1.22)
MONOCYTES NFR BLD AUTO: 7 % (ref 4–12)
NEUTROPHILS # BLD AUTO: 5.59 THOUSANDS/ΜL (ref 1.85–7.62)
NEUTS SEG NFR BLD AUTO: 66 % (ref 43–75)
NRBC BLD AUTO-RTO: 0 /100 WBCS
PLATELET # BLD AUTO: 188 THOUSANDS/UL (ref 149–390)
PMV BLD AUTO: 12.1 FL (ref 8.9–12.7)
POTASSIUM SERPL-SCNC: 5 MMOL/L (ref 3.5–5.3)
PROT SERPL-MCNC: 7.6 G/DL (ref 6.4–8.2)
RBC # BLD AUTO: 3.51 MILLION/UL (ref 3.88–5.62)
SODIUM SERPL-SCNC: 138 MMOL/L (ref 136–145)
TIBC SERPL-MCNC: 377 UG/DL (ref 250–450)
WBC # BLD AUTO: 8.3 THOUSAND/UL (ref 4.31–10.16)

## 2021-12-10 PROCEDURE — 83735 ASSAY OF MAGNESIUM: CPT

## 2021-12-10 PROCEDURE — 80053 COMPREHEN METABOLIC PANEL: CPT

## 2021-12-10 PROCEDURE — 83540 ASSAY OF IRON: CPT

## 2021-12-10 PROCEDURE — 36415 COLL VENOUS BLD VENIPUNCTURE: CPT

## 2021-12-10 PROCEDURE — 85025 COMPLETE CBC W/AUTO DIFF WBC: CPT

## 2021-12-10 PROCEDURE — 82728 ASSAY OF FERRITIN: CPT

## 2021-12-10 PROCEDURE — 82306 VITAMIN D 25 HYDROXY: CPT

## 2021-12-10 PROCEDURE — 83550 IRON BINDING TEST: CPT

## 2021-12-10 RX ORDER — METOPROLOL SUCCINATE 100 MG/1
100 TABLET, EXTENDED RELEASE ORAL DAILY
Qty: 7 TABLET | Refills: 0 | Status: SHIPPED | OUTPATIENT
Start: 2021-12-10 | End: 2021-12-13 | Stop reason: SDUPTHER

## 2021-12-13 ENCOUNTER — PATIENT OUTREACH (OUTPATIENT)
Dept: CASE MANAGEMENT | Facility: OTHER | Age: 76
End: 2021-12-13

## 2021-12-13 ENCOUNTER — OFFICE VISIT (OUTPATIENT)
Dept: INTERNAL MEDICINE CLINIC | Facility: CLINIC | Age: 76
End: 2021-12-13
Payer: MEDICARE

## 2021-12-13 VITALS
BODY MASS INDEX: 29.59 KG/M2 | SYSTOLIC BLOOD PRESSURE: 130 MMHG | WEIGHT: 243 LBS | DIASTOLIC BLOOD PRESSURE: 84 MMHG | HEIGHT: 76 IN | HEART RATE: 80 BPM | RESPIRATION RATE: 13 BRPM | TEMPERATURE: 98.4 F

## 2021-12-13 DIAGNOSIS — I12.9 HYPERTENSION WITH RENAL DISEASE: ICD-10-CM

## 2021-12-13 DIAGNOSIS — N25.81 SECONDARY RENAL HYPERPARATHYROIDISM (HCC): ICD-10-CM

## 2021-12-13 DIAGNOSIS — E11.42 DIABETIC POLYNEUROPATHY ASSOCIATED WITH TYPE 2 DIABETES MELLITUS (HCC): ICD-10-CM

## 2021-12-13 DIAGNOSIS — R09.89 LEFT CAROTID BRUIT: ICD-10-CM

## 2021-12-13 DIAGNOSIS — Z79.4 TYPE 2 DIABETES MELLITUS WITH STAGE 3 CHRONIC KIDNEY DISEASE, WITH LONG-TERM CURRENT USE OF INSULIN, UNSPECIFIED WHETHER STAGE 3A OR 3B CKD (HCC): Primary | ICD-10-CM

## 2021-12-13 DIAGNOSIS — I70.209 ARTERIOSCLEROSIS OF ARTERY OF EXTREMITY (HCC): ICD-10-CM

## 2021-12-13 DIAGNOSIS — Z01.818 PREOP EXAMINATION: ICD-10-CM

## 2021-12-13 DIAGNOSIS — N18.4 CKD (CHRONIC KIDNEY DISEASE) STAGE 4, GFR 15-29 ML/MIN (HCC): ICD-10-CM

## 2021-12-13 DIAGNOSIS — E11.621 DIABETIC ULCER OF LEFT MIDFOOT ASSOCIATED WITH TYPE 2 DIABETES MELLITUS, WITH BONE INVOLVEMENT WITHOUT EVIDENCE OF NECROSIS (HCC): ICD-10-CM

## 2021-12-13 DIAGNOSIS — D09.0 CARCINOMA IN SITU OF BLADDER: ICD-10-CM

## 2021-12-13 DIAGNOSIS — E11.22 TYPE 2 DIABETES MELLITUS WITH STAGE 3 CHRONIC KIDNEY DISEASE, WITH LONG-TERM CURRENT USE OF INSULIN, UNSPECIFIED WHETHER STAGE 3A OR 3B CKD (HCC): Primary | ICD-10-CM

## 2021-12-13 DIAGNOSIS — L97.426 DIABETIC ULCER OF LEFT MIDFOOT ASSOCIATED WITH TYPE 2 DIABETES MELLITUS, WITH BONE INVOLVEMENT WITHOUT EVIDENCE OF NECROSIS (HCC): ICD-10-CM

## 2021-12-13 DIAGNOSIS — E78.2 MIXED HYPERLIPIDEMIA: ICD-10-CM

## 2021-12-13 DIAGNOSIS — F11.20 CONTINUOUS OPIOID DEPENDENCE (HCC): ICD-10-CM

## 2021-12-13 DIAGNOSIS — I25.118 CORONARY ARTERY DISEASE OF NATIVE ARTERY OF NATIVE HEART WITH STABLE ANGINA PECTORIS (HCC): ICD-10-CM

## 2021-12-13 DIAGNOSIS — N18.30 TYPE 2 DIABETES MELLITUS WITH STAGE 3 CHRONIC KIDNEY DISEASE, WITH LONG-TERM CURRENT USE OF INSULIN, UNSPECIFIED WHETHER STAGE 3A OR 3B CKD (HCC): Primary | ICD-10-CM

## 2021-12-13 DIAGNOSIS — F32.1 MODERATE MAJOR DEPRESSION, SINGLE EPISODE (HCC): ICD-10-CM

## 2021-12-13 PROCEDURE — 99213 OFFICE O/P EST LOW 20 MIN: CPT | Performed by: INTERNAL MEDICINE

## 2021-12-13 RX ORDER — METOPROLOL SUCCINATE 100 MG/1
100 TABLET, EXTENDED RELEASE ORAL DAILY
Qty: 90 TABLET | Refills: 0 | Status: SHIPPED | OUTPATIENT
Start: 2021-12-13 | End: 2022-05-10 | Stop reason: SDUPTHER

## 2021-12-15 DIAGNOSIS — D09.0 CARCINOMA IN SITU OF BLADDER: ICD-10-CM

## 2021-12-15 RX ORDER — HYDROCODONE BITARTRATE AND ACETAMINOPHEN 5; 325 MG/1; MG/1
TABLET ORAL
Qty: 30 TABLET | Refills: 0 | Status: SHIPPED | OUTPATIENT
Start: 2021-12-15 | End: 2022-03-11

## 2021-12-16 DIAGNOSIS — Z20.822 EXPOSURE TO COVID-19 VIRUS: Primary | ICD-10-CM

## 2021-12-16 PROCEDURE — U0005 INFEC AGEN DETEC AMPLI PROBE: HCPCS | Performed by: INTERNAL MEDICINE

## 2021-12-16 PROCEDURE — U0003 INFECTIOUS AGENT DETECTION BY NUCLEIC ACID (DNA OR RNA); SEVERE ACUTE RESPIRATORY SYNDROME CORONAVIRUS 2 (SARS-COV-2) (CORONAVIRUS DISEASE [COVID-19]), AMPLIFIED PROBE TECHNIQUE, MAKING USE OF HIGH THROUGHPUT TECHNOLOGIES AS DESCRIBED BY CMS-2020-01-R: HCPCS | Performed by: INTERNAL MEDICINE

## 2021-12-17 ENCOUNTER — DOCUMENTATION (OUTPATIENT)
Dept: RADIATION ONCOLOGY | Facility: CLINIC | Age: 76
End: 2021-12-17

## 2022-01-02 DIAGNOSIS — M54.16 LUMBAR RADICULOPATHY: ICD-10-CM

## 2022-01-02 DIAGNOSIS — M47.816 LUMBAR SPONDYLOSIS: ICD-10-CM

## 2022-01-02 DIAGNOSIS — M51.36 DEGENERATION OF LUMBAR INTERVERTEBRAL DISC: ICD-10-CM

## 2022-01-03 RX ORDER — DULOXETIN HYDROCHLORIDE 20 MG/1
CAPSULE, DELAYED RELEASE ORAL
Qty: 30 CAPSULE | Refills: 1 | Status: SHIPPED | OUTPATIENT
Start: 2022-01-03 | End: 2022-02-02 | Stop reason: SDUPTHER

## 2022-01-04 ENCOUNTER — APPOINTMENT (OUTPATIENT)
Dept: RADIATION ONCOLOGY | Facility: CLINIC | Age: 77
End: 2022-01-04
Attending: INTERNAL MEDICINE
Payer: MEDICARE

## 2022-01-04 PROCEDURE — 77334 RADIATION TREATMENT AID(S): CPT | Performed by: INTERNAL MEDICINE

## 2022-01-04 PROCEDURE — 77399 UNLISTED PX MED RADJ PHYSICS: CPT | Performed by: INTERNAL MEDICINE

## 2022-01-07 ENCOUNTER — DOCUMENTATION (OUTPATIENT)
Dept: HEMATOLOGY ONCOLOGY | Facility: CLINIC | Age: 77
End: 2022-01-07

## 2022-01-07 NOTE — PROGRESS NOTES
Hematology/Oncology Outpatient Office Note    Date of Service: 1/10/2022    [facility] HEMATOLOGY ONCOLOGY SPECIALISTS [address] [facility]  [phone]    Reason for Consultation:   Chief Complaint   Patient presents with    Follow-up       Cancer Stage at diagnosis: II    Referral Physician: No ref  provider found    Primary Care Physician:  Nikhil Becerra MD     Nickname: Chiara Bowles    Spouse: Dorna Goltz ECO    Today's ECO    Goals and Barriers:  Current Goal: Minimize effects of disease burden, extend life  Barriers to accomplishing this: fatigue, depression, anxiety, worry, L foot claudication, lower back pain from spinal stenosis and uses a cane a lot of the time    Patient's Capacity to Self Care:  Patient is able to self care    Code Status: Full code    Advanced directives: not on file but I recommended he get that done    ASSESSMENT & PLAN      Diagnosis ICD-10-CM Associated Orders   1  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)  C67 3 lidocaine-prilocaine (EMLA) cream     Ambulatory referral to Interventional Radiology     Infusion Calculated Appointment Request     CBC and differential     Comprehensive metabolic panel     Infusion Calculated Appointment Request     Infusion Calculated Appointment Request     CBC and differential     Comprehensive metabolic panel     Infusion Calculated Appointment Request       This is a 64 y o  c PMHx notable for spinal stenosis, Gastric bypass (), 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, diabetic nephropathy, and renal vascular disease, DM, being seen in consultation for bladder cancer       From an overall performance status basis, I believe Bertrand Rodriguez can tolerate systemic treatment due to an ECOG PS of 2  A shared decision making approach was employed during today's visit       Thoughts on approach to systemic therapy in the first line and subsequent lines of therapy:    My favored first line is cisplatin/ gemcitabine however the patient's overall performance status and kidney function is not amenable to this  Thoughts on prediction for Grade III-V toxicity in elderly patients to systemic chemotherapy:  Age >72 years   GI/ cancers  Falls in last 6 months   Hearing impairment (b/l hearing aids)  Limited ability to walk 1 block  Requires assistance with medications  Decreased social activities   Cheri Parker J[employer] 2011)  BMI<18 5 or moderate or severe weight loss or decrease in food intake in past 3 months  Mild vs moderate dementia/depression     The above 5 factors predict toxicity for the patient if he undergoes systemic chemotherapy and I discussed this with the patient  For unresectable cases: ICI can be employed if there is POD after definitive CRT    Discussion of disease response monitoring: We discussed with the patient at length the role of imaging and potential blood monitoring of burden of disease  We will opt to get CT chest, abdomen, pelvis without contrast due to kidney function as surveillance following conclusion of therapy  Should there be progression of disease, our plan is to employ repeat biopsy(ies) to assess actionable biomarkers and determine updated bio-marker status  Scenario for definitive CRT (non-surgical candidate): My plan is to incorporate 5-FU (During days 1-5 and 16-20 of RT) + Mitomycin (day 1 of each cycle)  This is based on:    Multicenter, phase III study where the regimen consisted of fluorouracil (500 mg per square meter of body-surface area per day) during fractions 1 to 5 and 16 to 20 of radiotherapy and mitomycin C (12 mg per square meter) on day 1  Patients were also randomly assigned to undergo either whole-bladder radiotherapy or modified-volume radiotherapy (in which the volume of bladder receiving full-dose radiotherapy was reduced)   2 year locoregional disease-free survival were 67% (95% confidence interval [CI], 59 to 74) in the chemoradiotherapy group and 54% (95% CI, 46 to 62) in the radiotherapy group  Five-year rates of overall survival were 48% (95% CI, 40 to 55) in the chemoradiotherapy group and 35% (95% CI, 28 to 43) in the radiotherapy group (hazard ratio, 0 82; 95% CI, 0 63 to 1 09; P=0 16)  Grade 3 or 4 adverse events were slightly more common in the chemoradiotherapy group than in the radiotherapy group during treatment (36 0% vs  27 5%, P=0 07) but not during follow-up (8 3% vs  15 7%, P=0 07)  Possible side effect such as renal insufficiency, nausea, vomiting, dehydration, allergic reaction, neuropathy, hearing loss, thrombocytopenia, anemia, infection, and death conveyed to the patient  The patient is advised to stay hydrated by consuming a lot of water and electrolyte-rich fluids (>2L) throughout the day  Discussion of decision making   Oncology history updated, accordingly, during this visit   Goals of care/patient communication  o I discussed with the patient the clinical course leading up to their cancer diagnosis  I reviewed relevant office notes, imaging reports and pathology result as well   o I told the patient that this is a case of potentially curable disease and what this means  We discussed that the goal of anti-cancer therapy is to provide best quality of life, extend overall survival, and progression free survival as shown in clinical trials   We also discussed that there might be a point when the cancer will no longer respond to this anti-neoplastic therapy    o I explained the risks/benefits of the proposed cancer therapy: 5-FU+Mitomycin + RT and after discussion including understanding risks of possible life-threatening complications and therapy-related malignancy development, informed consent for treatment has been signed   TNM/Staging At Diagnosis  Cancer Staging  Malignant neoplasm of anterior wall of urinary bladder [facility])  Staging form: Urinary Bladder, AJCC 8th Edition  - Clinical stage from 10/18/2021: Stage II (cT2, cN0, cM0) - Signed by Gaurang Watson MD on 11/17/2021  Stage prefix: Initial diagnosis  o    Disease Features/Tumor Markers/Genetics  o Tumor Marker:   o Notable Path Features: anterior wall Invasive high-grade urothelial carcinoma  Carcinoma invades muscularis propria (detrusor muscle)    Treatment: 5-FU + MMT   Other Supportive care:    Treatment Team Members  o Surgeon: Dr Kathrine Gonzalez: Dr Tiffany Edmonds  o Palliative: [facility]: creat 2 81 (eGFR 24), normocytic anemia, Hgb 9 8   Diagnostics: 11/11/21 CT CAP w/o c: 1  No metastatic disease within the limitations of noncontrast technique in the chest abdomen or pelvis  2   Diffuse hemicircumferential smooth bladder wall thickening on the left, similar to the prior study when allowing for differences in bladder distention  Prostatomegaly        Discussion of decision making    I personally communicated with Dr Tiffany Edmonds on this case and have reviewed the following lab results, the image studies, pathology, other specialty/physicians consult notes and recommendations, and outside medical records from [facility]  I had a lengthy discussion with the patient and shared the work-up findings  We discussed the diagnosis and management plan as below  I spent 34 minutes reviewing the records (labs, clinician notes, outside records, medical history, ordering medicine/tests/procedures, interpreting the imaging/labs previously done) and coordination of care as well as direct time with the patient today, of which greater than 50% of the time was spent in counseling and coordination of care with the patient/family  · Plan/Labs  · Candidate for genetic testing? NCCN criteria to be met  · Continue to assess for signs of distress as he has anxiety/depression worsened from baseline   Can consider palliative care referral if this worsens  · F/u Rad Onc: RT scheduled to start 1/18/22 (20 fractions planned)  · IR for port insertion referral  · EMLA cream sent today  · Infusion center appointments and lab appointments for 5-FU + MMT to be made to coincide with RT  (tentatively start next week pending port scheduling)        Follow Up: 3 weeks (mid treatment)    All questions were answered to the patient's satisfaction during this encounter  The patient knows the contact information for our office and knows to reach out for any relevant concerns related to this encounter  They are to call for any temperature 100 4 or higher, new symptoms including but not restricted to shaking chills, decreased appetite, nausea, vomiting, diarrhea, increased fatigue, shortness of breath or chest pain, confusion, and not feeling the strength to come to the clinic  For all other listed problems and medical diagnosis in their chart - they are managed by PCP and/or other specialists, which the patient acknowledges  Thank you very much for your consultation and making us a part of this patient's care  We are continuing to follow closely with you  Please do not hesitate to reach out to me with any additional questions or concerns  Carol Brady MD  Hematology & Medical Oncology Staff Physician             Disclaimer: This document was prepared using [employer] Fluency Direct technology  If a word or phrase is confusing, or does not make sense, this is likely due to recognition error which was not discovered during this clinician's review  If you believe an error has occurred, please contact me through [address] [place] [address] for godfrey? 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)    Initial Diagnosis    Malignant neoplasm of anterior wall of urinary bladder ([facility] [address] )     1994 Surgery    TURBT      1994 - 1995 Chemotherapy    Induction intravesical BCG x 2      8/17/2016 Surgery    TURBT      12/13/2016 Surgery    TURBT  [facility])    Bladder, left posterior wall, biopsy: High-grade urothelial carcinoma in-situ  Muscularis propria is identified with no tumor seen  Bladder, trigone, biopsy: Non-invasive high-grade papillary urothelial carcinoma, and flat urothelial carcinoma in-situ  Muscularis propria is not identified  1/4/2017 - 2/8/2017 Chemotherapy    Induction intravesical BCG     6/19/2017 Surgery    TURBT  [facility])    - Posterior wall, biopsy: Mild chronic cystitis with focal urothelial atypia  Muscularis propria is identified      - Right lateral wall, biopsy: Granulomatous cystitis  Muscularis propria is identified  - Left lateral wall, biopsy: Non invasive high-grade urothelial carcinoma  Muscularis propria is not identified  4/26/2019 Surgery    TURBT   [facility])     - bladder, right posterior wall, biopsy: Urothelial carcinoma in situ   - bladder, right lateral wall, biopsy: Small focus of urothelial carcinoma in situ  - bladder, left posterior wall, biopsy: Urothelial carcinoma in situ   - bladder, left lateral wall, biopsy: Urothelial carcinoma in situ     - bladder, trigone, biopsy: Invasive high-grade urothelial carcinoma, invading into lamina propria  No lymphovascular invasion seen  Muscularis propria not present  Separate piece showing urothelial carcinoma in situ        7/2019 -  Chemotherapy    Induction intravesical BCG x 4      11/4/2019 Surgery    TURBT  [facility])    - Superficial bladder tumor, transurethral resection: Non-invasive high grade urothelial carcinoma, with urothelial carcinoma in situ  Muscularis propria is not identified      - Bladder tumor, transurethral resection: Non-invasive high grade urothelial carcinoma, with urothelial carcinoma in situ, see note  Muscularis propria is present and free of tumor          12/9/2019 - 1/28/2020 Chemotherapy    Induction intravesical BCG+INF        6/9/2020 - 6/23/2020 Chemotherapy    Maintenance intravesical BCG+INF        11/19/2020 Biopsy    TURBT [facility] [address]  [facility])    A  Urinary Bladder, Left Posterior Bladder Wall:  -Extensively- denuded urothelial lined mucosa with mild chronic inflammation, vascular congestion  And edema   -Unremarkable small fragment of detrusor muscle present  -No evidence of invasive urothelial carcinoma seen     B  Urinary Bladder, Left lateral bladder Wall:  -Partially-denuded urothelial lined mucosa with mild  chronic inflammation  -Unremarkable small fragment of detrusor muscle present  -No evidence of invasive urothelial carcinoma seen     C  Urinary Bladder, Right Posterior Bladder wall:  -Urothelial lined mucosa with mild  chronic inflammation Von Brunn nests and mild urothelial atypia, possibly due to previous BCG administration   -Unremarkable small fragment of detrusor muscle present  -No evidence of invasive urothelial carcinoma seen     D  Urinary Bladder, Right Lateral Bladder Wall:  - Urothelial lined mucosa with mild  chronic inflammation   -Muscularis propria/ detrusor muscle is not present for evaluation    -No evidence of invasive urothelial carcinoma seen  E  Prostate, Prostate Tissue:  -Urothelial lined mucosa with mild chronic inflammation, prominent Von Brunn nests and Cystitis cystica   -Unremarkable small fragment of detrusor muscle present   -No evidence of malignancy seen  10/18/2021 -  Cancer Staged    Staging form: Urinary Bladder, AJCC 8th Edition  - Clinical stage from 10/18/2021: Stage II (cT2, cN0, cM0) - Signed by Lisa Maya MD on 11/17/2021  Stage prefix: Initial diagnosis       10[facility]2021 [facility]    [facility]    A  Left posterior wall, bladder (transurethral resection):     - Urothelial tissue with small atypical cauterized focus favored to represent superficially invasive high-grade urothelial carcinoma  - Muscularis propria (detrusor muscle) present, and negative for carcinoma  - Marked edema and mucosal denudation with thermal artifact noted  B    Anterior wall, bladder (transurethral resection):      - Invasive high-grade urothelial carcinoma  - Carcinoma invades muscularis propria (detrusor muscle)  C   Left lateral wall, bladder (transurethral resection):     - Urothelial tissue with small atypical focus with marked thermal artifact; cannot exclude superficial carcinoma  - Muscularis propria (detrusor muscle) present, and negative for carcinoma  - Marked edema and mucosal denudation with thermal artifact noted     1/19/2022 -  Chemotherapy    mitoMYcin (MUTAMYCIN), 12 mg/m2 = 28 7 mg (original dose ), Intravenous, Once, 0 of 1 cycle  Dose modification: 12 mg/m2 (Cycle 1)  fluorouracil (ADRUCIL) ambulatory infusion Soln, 500 mg/m2/day = 4,780 mg (50 % of original dose 1,000 mg/m2/day), Intravenous, Over 96 hours, 0 of 1 cycle, Start date: --, End date: --  Dose modification: 500 mg/m2/day (original dose 1,000 mg/m2/day, Cycle 1, Reason: Dose modified as per discussion with consulting physician)       He had been getting BCG vaccine for his bladder since 1994  SUBJECTIVE  (INTERVAL HISTORY)      Clotting History None   Bleeding History No major events   Cancer History Bladder   Family Cancer History None   H/O Blood/Plt Transfusion None   Tobacco/etoh/drug abuse 1 5 PPD x 17 years (quit 1970), no other abuse   Hx COVID19 Infection and Vaccine Status Pfizer x 3 (had booster 9/2021)   Cancer Screening history n/a   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)   New medications in the last month: PO iron daily (recommended MWF)  Pain: dysuria (since 10/2021 bladder procedure), LBP, L foot pain s/p remote toe amputation     His maximum weight prior to the gastric bypass that he had was 330lbs  I have reviewed the relevant past medical, surgical, social and family history  I have also reviewed allergies and medications for this patient      Review of Systems  Denies unintentional weight loss, F/C, N/V, CP, diarrhea, constipation, rash, itching, melena, hematuria, hematochezia  Chronic mild SOB with exertion, intermittent anxiety and depression that has been worsening since his cancer diagnosis  He has had fatigue since 2/2021 (his discharge from the hospital for LLE toe amputation)  No new issues although he has been dealing with his chronic back pain more so recently  A 10-point review of system was performed, pertinent positive and negative were detailed as above  Otherwise, the 10-point review of system was negative  Past Medical History:   Diagnosis Date    Anemia     Last assessed: 9/28/17    Anxiety     Arteriosclerotic cardiovascular disease     Last assessed: 9/28/17    Arthritis     Bladder cancer ([facility] [address] )     bladder- had BCG treatments    Chronic kidney disease     Stage IV    CKD (chronic kidney disease) stage 4, GFR 15-29 ml/min ([facility])     Colon polyp     Coronary artery disease     7 stents    Depression     Diabetes mellitus ([facility])     IDDM    GERD (gastroesophageal reflux disease)     Glaucoma     Hematuria     History of fusion of cervical spine     Hyperlipidemia     Hypertension     Insomnia     Last assessed: 11/14/12    Loss of hearing     has hearing aids but usually does not wear them    Other seasonal allergic rhinitis     Last assessed: 2/10/16    PAD (peripheral artery disease) ([facility])     Shortness of breath     on exertion    Spinal stenosis of lumbar region     Transient cerebral ischemia     No Residual    Uses walker     w/c for longer distances       Past Surgical History:   Procedure Laterality Date    CARDIAC SURGERY      Cath stent placement  Last assessed: 3/9/17  Interventional Catheterization    CHOLECYSTECTOMY      COLONOSCOPY      CYSTOSCOPY      Diagnostic w/biopsy  [place]   Last assessed: 12/1/14    CYSTOSCOPY W/ URETERAL STENT PLACEMENT Bilateral 10/18/2021    Procedure: bilateral retrogrades, cytology collection;  Surgeon: Alannah Lowe MD;  [facility];  Service: Urology    CYSTOURETHROSCOPY      w/cautery  Marvell Severs    FL RETROGRADE PYELOGRAM  10/18/2021    FL RETROGRADE PYELOGRAM  10/24/2021    GASTRIC BYPASS      For morbid obesity w/Shaji-en-Y   Resolved: 11/17/09    INCISION AND DRAINAGE OF WOUND Right 2/26/2017    Procedure: INCISION AND DRAINAGE (I&D) EXTREMITY WITH APPLICATION OF GRAFT JACKET;  Surgeon: Annette Cordero DPM;  [facility];  Service:     INCISION AND DRAINAGE OF WOUND Right 4/25/2017    Procedure: INCISION AND DRAINAGE (I&D) EXTREMITY, APPLICATION OF GRAFT;  Surgeon: Annette Cordero DPM;  [facility];  Service:     IR BIOPSY OTHER  7/2/2020    IR LOWER EXTREMITY ANGIOGRAM  2/8/2021    IR LOWER EXTREMITY ANGIOGRAM  2/11/2021    IR TUNNELED CENTRAL LINE PLACEMENT  12/24/2020    JOINT REPLACEMENT      christofer knees replaced    OH AMPUTATION METATARSAL+TOE,SINGLE Left 12/21/2020    Procedure: RAY RESECTION FOOT;  Surgeon: Barbara Almaguer DPM;  [facility];  Service: Podiatry    OH AMPUTATION METATARSAL+TOE,SINGLE Left 12/31/2020    Procedure: 5TH MET RESECTION;  Surgeon: Barbara Almaguer DPM;  [facility];  Service: Podiatry    OH CYSTOURETHROSCOPY W/IRRIG & EVAC CLOTS N/A 2/10/2021    Procedure: CYSTOSCOPY EVACUATION OF CLOTS, fulguration;  Surgeon: Deanna Balderas MD;  [facility];  Service: Urology    OH CYSTOURETHROSCOPY W/IRRIG & EVAC CLOTS N/A 10/24/2021    Procedure: CYSTOSCOPY EVACUATION OF CLOT, fulguration of bleeding vessels, right ureter stent placement, retrograde pyelogram;  Surgeon: Alexy Montgomery MD;  [facility];  Service: Urology    OH CYSTOURETHROSCOPY,BIOPSY N/A 8/16/2016    Procedure: CYSTOSCOPY WITH BIOPSIES;  Surgeon: Prescott Leyden, MD;  [facility];  Service: Urology    OH CYSTOURETHROSCOPY,FULGUR <0 5 CM LESN N/A 11/19/2020    Procedure: CYSTO W/BIOPSIES, transurethral prostate bx;  Surgeon: Virgilio Stephenson MD;  [facility];  Service: Urology    LA CYSTOURETHROSCOPY,FULGUR >5 CM LESN Bilateral 10/18/2021    Procedure: TRANSURETHRAL RESECTION OF BLADDER TUMOR (TURBT);   Surgeon: Dejah Urbina MD;  [facility];  Service: Urology    LA Cheo Roque 3RD+ ORD [address] PEL/LXTR [facility] Left 2021    Procedure: LEG angiogram, CO2 w/limited contrast with balloon angioplasty postertior tibial artery;  Surgeon: Chris Encarnacion MD;  [facility];  Service: Vascular    ROTATOR CUFF REPAIR      SMALL INTESTINE SURGERY      Surgery Shaji-en-Y    SPINAL FUSION      lumbar and cervical fusions    VAC DRESSING APPLICATION Right     Procedure: APPLICATION VAC DRESSING;  Surgeon: Ashanti Tucker DPM;  [facility];  Service:    [address] [place] [address] Left 2021    Procedure: FOOT DEBRIDE, [address] OUT w/graft application;  Surgeon: Ashanti Tucker DPM;  [facility];  Service: Podiatry       Family History   Problem Relation Age of Onset    Diabetes Mother     Heart disease Mother     Other Mother         High blood pressure    Heart disease Father     Diabetes Sister     Other Sister         High blood pressure    Kidney disease Sister     Heart disease Brother     Other Brother         High blood pressure       Social History     Socioeconomic History    Marital status: /Civil Union     Spouse name: Not on file    Number of children: Not on file    Years of education: Not on file    Highest education level: Not on file   Occupational History    Not on file   Tobacco Use    Smoking status: Former Smoker     Packs/day: 3 00     Years: 27 00     Pack years: 81 00     Types: Cigarettes     Quit date:      Years since quittin 0    Smokeless tobacco: Never Used   Vaping Use    Vaping Use: Never used   Substance and Sexual Activity    Alcohol use: Not Currently     Comment: beer / liquor    Drug use: Not Currently     Types: Marijuana Comment: quit 2019 had medical marijuana    Sexual activity: Not Currently   Other Topics Concern    Not on file   Social History Narrative    Consumes 1 cup of coffee and 1 soda per day     Social Determinants of Health     Financial Resource Strain: Not on file   Food Insecurity: Not on file   Transportation Needs: Not on file   Physical Activity: Not on file   Stress: Not on file   Social Connections: Not on file   Intimate Partner Violence: Not on file   Housing Stability: Not on file       Allergies   Allergen Reactions    Atorvastatin Hives, Itching and Rash    Simvastatin Rash and Edema     Edema of lower legs    Statins Hives and Itching    Insulin Lispro Swelling and Edema     " Lower Legs"    Other Itching, Rash and Other (See Comments)     "EKG Patches"        Current Outpatient Medications   Medication Sig Dispense Refill    acetaminophen (TYLENOL) 325 mg tablet Take 650 mg by mouth every 6 (six) hours as needed for mild pain      ALPRAZolam (XANAX) 0 25 mg tablet At twice a day as needed for anxiety 30 tablet 0    aspirin (ECOTRIN LOW STRENGTH) 81 mg EC tablet Take 1 tablet (81 mg total) by mouth daily      Azelastine HCl 0 15 % SOLN Inhale 1 spray 2 (two) times a day 30 mL 3    Bimatoprost (LUMIGAN OP) Apply 1 drop to eye daily at bedtime       calcitriol (ROCALTROL) 0 25 mcg capsule Take 1 capsule (0 25 mcg total) by mouth daily 30 capsule 1    docusate sodium (COLACE) 100 mg capsule Take 1 capsule (100 mg total) by mouth 2 (two) times a day 10 capsule 0    DULoxetine (CYMBALTA) 20 mg capsule TAKE 1 CAPSULE(20 MG) BY MOUTH DAILY 30 capsule 1    ezetimibe (ZETIA) 10 mg tablet Take 10 mg by mouth daily in the early morning       Ferrous Sulfate Dried (Feosol) 200 (65 Fe) MG TABS Take 65 mg by mouth daily 30 tablet 0    fluocinonide (LIDEX) 0 05 % cream Apply topically 2 (two) times a day (Patient taking differently: Apply topically as needed ) 30 g 0    fluticasone (FLONASE) 50 mcg/act nasal spray 1 spray into each nostril daily (Patient taking differently: 1 spray into each nostril as needed ) 11 mL 1    furosemide (LASIX) 20 mg tablet TAKE 1 TABLET BY MOUTH AS NEEDED FOR EDEMA 90 tablet 1    GABAPENTIN PO Take 200 mg by mouth      HYDROcodone-acetaminophen (NORCO) 5-325 mg per tablet 1-2 tab every 6 hr if needed for pain 30 tablet 0    Insulin Pen Needle 31G X 8 MM MISC Inject 3 times a day 100 each 2    isosorbide mononitrate (IMDUR) 30 mg 24 hr tablet Take 1 tablet (30 mg total) by mouth daily in the early morning 30 tablet 1    Lantus SoloStar 100 units/mL injection pen 18 units qhs (Patient taking differently: Inject 18 Units under the skin daily at bedtime ) 30 mL 1    loperamide (IMODIUM) 2 mg capsule Take 2 mg by mouth 4 (four) times a day as needed for diarrhea      multivitamin (THERAGRAN) TABS Take 1 tablet by mouth daily   NovoLOG FlexPen 100 units/mL injection pen 10 units with each meal  (Patient taking differently: Inject 10 Units under the skin 3 (three) times a day with meals ) 5 pen 1    omeprazole (PriLOSEC) 20 mg delayed release capsule Take 20 mg by mouth daily in the early morning        lidocaine-prilocaine (EMLA) cream Apply topically as needed for mild pain 30 g 0    metoprolol succinate (TOPROL-XL) 100 mg 24 hr tablet Take 1 tablet (100 mg total) by mouth daily for 7 days 90 tablet 0    Myrbetriq 50 MG TB24   (Patient not taking: Reported on 1/10/2022 )      naloxone (NARCAN) 4 mg/0 1 mL nasal spray Administer 1 spray into a nostril  If no response after 2-3 minutes, give another dose in the other nostril using a new spray   (Patient not taking: Reported on 1/10/2022 ) 1 each 1    oxybutynin (DITROPAN) 5 mg tablet Take 1 tablet (5 mg total) by mouth 3 (three) times a day for 7 days (Patient not taking: Reported on 11/17/2021 ) 21 tablet 0    sertraline (ZOLOFT) 50 mg tablet Take 1 tablet (50 mg total) by mouth daily (Patient taking differently: Take 50 mg by mouth daily in the early morning ) 30 tablet 3    tamsulosin (FLOMAX) 0 4 mg Take 1 capsule (0 4 mg total) by mouth daily at bedtime (Patient taking differently: Take 0 4 mg by mouth daily as needed ) 30 capsule 0     No current facility-administered medications for this visit  (Not in a hospital admission)      Objective:     24 Hour Vitals Assessment:     Vitals:    01/10/22 0905   BP: 112/78   Pulse: 82   Resp: 15   Temp: (!) 97 °F (36 1 °C)   SpO2: 98%       PHYSICIAN EXAM:    General: Appearance: alert, cooperative, no distress  HEENT: Normocephalic, atraumatic  No scleral icterus  conjunctivae clear  EOMI  Chest: No tenderness to palpation  No open wound noted  Lungs: Clear to auscultation bilaterally, Respirations unlabored  Cardiac: Regular rate and rhythm, +S1and S2, -r/m/g  Abdomen: Soft, non-tender, non-distended  Bowel sounds are normal    Extremities:  No edema, cyanosis, clubbing  Skin: Skin color, turgor are normal  No rashes  Lymphatics: no palpable supra-cervical, axillary, or inguinal adenopathy  Neurologic: Awake, Alert, and oriented, no gross focal deficits noted b/l  DATA REVIEW:    Pathology Result:    Final Diagnosis   Date Value Ref Range Status   10/18/2021   Final    A  Left posterior wall, bladder (transurethral resection):     - Urothelial tissue with small atypical cauterized focus favored to represent superficially invasive high-grade urothelial carcinoma  - Muscularis propria (detrusor muscle) present, and negative for carcinoma  - Marked edema and mucosal denudation with thermal artifact noted  B  Anterior wall, bladder (transurethral resection):      - Invasive high-grade urothelial carcinoma  - Carcinoma invades muscularis propria (detrusor muscle)  C   Left lateral wall, bladder (transurethral resection):     - Urothelial tissue with small atypical focus with marked thermal artifact; cannot exclude superficial carcinoma  - Muscularis propria (detrusor muscle) present, and negative for carcinoma  - Marked edema and mucosal denudation with thermal artifact noted  Comment: This is an appended report  These results have been appended to a previously preliminary verified report  10/18/2021   Final    A  Renal Washing, RIGHT RENAL PELVIS WASHING:  Suspicious for high grade urothelial carcinoma [facility]) - see comment  Comment:  The above diagnostic category is from the recently published book, [facility] [place] [facility]inary Cyt[facility], and is in keeping with the ongoing effort for utilization of standardized diagnostic terminology in urine cytology  *    *[facility] [place] [facility] Cyt[facility]  Renetta Grullon; 2016  B  Renal Washing, LEFT RENAL PELVIS WASHING:  Atypical urothelial cells (AUC) - see comment  Comment:  The above diagnostic category is from the recently published book, [facility] [place] [facility], and is in keeping with the ongoing effort for utilization of standardized diagnostic terminology in urine cytology  *    *[facility] [place] [facility]inary Cyt[facility]  Renetta FREDERICK  Rolly Stewartie Mitchel; 2016 09/03/2021   Final    A  Urine, Voided, :  Atypical urothelial cells (AUC) - see comment  Red blood cells     Satisfactory for evaluation  Comment:    - Few atypical urothelial cells noted in this patient with a 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  A high grade urothelial carcinoma cannot be excluded on this material  Suggest clinical correlation and follow-up as indicated  - The above diagnostic category is from the recently published book, [facility] [place] [facility]inary Cyt[facility], and is in keeping with the ongoing effort for utilization of standardized diagnostic terminology in urine cytology  *    *[facility] [place] [facility] Urinary Cyt[facility]  Renetta Sterling; 2016 ' 12/31/2020   Final    A  Left 5th metatarsal bone:  - Acute osteomyelitis in benign metatarsal bone  12/21/2020   Final    A  Bone, left 5th metatarsal, amputation:       - Acute osteomyelitis  - No dysplasia or malignancy is identified  B  Bone, 5th metatarsal clean margin, excision:       - Focal acute osteomyelitis  - Large caliber blood vessels with luminal calcifications and greater than 75% occlusion        - No dysplasia or malignancy is identified  Interpretation performed at [place] [id number]       11/19/2020   Final    A  Urinary Bladder, Left Posterior Bladder Wall:  -Extensively- denuded urothelial lined mucosa with mild chronic inflammation, vascular congestion  And edema   -Unremarkable small fragment of detrusor muscle present  -No evidence of invasive urothelial carcinoma seen    B  Urinary Bladder, Left lateral bladder Wall:  -Partially-denuded urothelial lined mucosa with mild  chronic inflammation  -Unremarkable small fragment of detrusor muscle present  -No evidence of invasive urothelial carcinoma seen    C  Urinary Bladder, Right Posterior Bladder wall:  -Urothelial lined mucosa with mild  chronic inflammation Von Brunn nests and mild urothelial atypia, possibly due to previous BCG administration   -Unremarkable small fragment of detrusor muscle present  -No evidence of invasive urothelial carcinoma seen    D  Urinary Bladder, Right Lateral Bladder Wall:  - Urothelial lined mucosa with mild  chronic inflammation   -Muscularis propria/ detrusor muscle is not present for evaluation    -No evidence of invasive urothelial carcinoma seen        E  Prostate, Prostate Tisssue:  -Urothelial lined mucosa with mild chronic inflammation, prominent Von Brunn nests and Cystitis cystica   -Unremarkable small fragment of detrusor muscle present   -No evidence of malignancy seen  08/17/2020   Final    A  Urine, Other, :  Negative for high grade urothelial carcinoma ([address]) - see comment  Urothelial cells, squamous cells, red blood cells and neutrophils  Satisfactory for evaluation  Comment:  The above diagnostic category is from the recently published book, [facility] [place] [facility] Urinary Cytology, and is in keeping with the ongoing effort for utilization of standardized diagnostic terminology in urine cytology  *    *[facility] [place] [facility]  Renetta Cuba; 2016             08/16/2016   Final    A   Bladder, biopsy:  -  High-grade urothelial carcinoma with flat and focal papillary architecture (see note)  07/18/2016   Final    A  Urine, clean catch (ThinPrep): Atypical urothelial cells (AUC) - see comment  Atypical single urothelial cells, benign squamous cells, red blood cells and neutrophils  Background of degenerative changes noted  Satisfactory for evaluation  Comment:  The above diagnostic category is from the recently published book, [facility] [place] [facility] Urinary Cytology, and is in keeping with the ongoing effort for utilization of standardized diagnostic terminology in urine cytology  *    *[facility] [place] [facility]  Renetta Cuba; 2016 01/18/2016   Final    A  Urine, Unspecified Source, :  Rare cluster of degenerated appearing urothelial cells present; see note  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  Few red blood cells also identified  Satisfactory for evaluation                  Image Results:   Image result are reviewed and documented in Hematology/Oncology history    CT chest abdomen pelvis wo contrast  Narrative: CT CHEST, ABDOMEN AND PELVIS WITHOUT IV CONTRAST    INDICATION:   C67 3: Malignant neoplasm of anterior wall of bladder  COMPARISON:  Prior studies, most recent CT scan is dated June 29, 2020  TECHNIQUE: CT examination of the chest, abdomen and pelvis was performed without intravenous contrast   Axial, sagittal, and coronal 2D reformatted images were created from the source data and submitted for interpretation  Radiation dose length product (DLP) for this visit:  647 mGy-cm   This examination, like all CT scans performed in the [facility], was performed utilizing techniques to minimize radiation dose exposure, including the use of iterative   reconstruction and automated exposure control  Enteric contrast was administered  FINDINGS:    CHEST    LUNGS:  Lungs are clear  There is no tracheal or endobronchial lesion  PLEURA:  Unremarkable  HEART/GREAT VESSELS:  Extensive coronary artery calcification is noted  Heart is otherwise unremarkable  No thoracic aortic aneurysm  MEDIASTINUM AND DIMAS:  Unremarkable  CHEST WALL AND LOWER NECK:   Unremarkable  ABDOMEN    LIVER/BILIARY TREE:  Unremarkable  GALLBLADDER:  Gallbladder is surgically absent  SPLEEN:  Unremarkable  PANCREAS: Unremarkable r    ADRENAL GLANDS:  Unremarkable  KIDNEYS/URETERS:  Bilateral renal cysts  Right-sided ureteral stent in place  No hydronephrosis  STOMACH AND BOWEL:  Changes compatible with Shaji-en-Y gastric bypass  Multiple colonic diverticula  Small bowel normal     APPENDIX:  No findings to suggest appendicitis  ABDOMINOPELVIC CAVITY:  No ascites  No pneumoperitoneum  No lymphadenopathy  VESSELS:  Atherosclerotic changes are present  No evidence of aneurysm  PELVIS    REPRODUCTIVE ORGANS:  Prostatomegaly, with the prostate gland measuring 6 9 cm (2, 121) previously 7 cm when measured in a similar fashion, not significantly changed  URINARY BLADDER:  Diffuse smooth bladder wall thickening involving the left side of the bladder    Previously demonstrated perivesicular fat stranding has resolved  ABDOMINAL WALL/INGUINAL REGIONS:  Unremarkable  OSSEOUS STRUCTURES:  No acute fracture or destructive osseous lesion  Impression: 1  No metastatic disease within the limitations of noncontrast technique in the chest abdomen or pelvis  2   Diffuse hemicircumferential smooth bladder wall thickening on the left, similar to the prior study when allowing for differences in bladder distention  3   Prostatomegaly, not significantly changed  Workstation performed: [id number]      LABS:  Lab data are reviewed and documented in HemOnc history  Lab Results   Component Value Date    HGB 10 9 (L) 12/10/2021    HCT 33 5 (L) 12/10/2021    MCV 95 12/10/2021     12/10/2021    WBC 8 30 12/10/2021    NRBC 0 12/10/2021    BANDSPCT 2 12/30/2020    ATYLMPCT 2 (H) 12/30/2020     Lab Results   Component Value Date     09/03/2015    K 5 0 12/10/2021     12/10/2021    CO2 26 12/10/2021    ANIONGAP 5 09/03/2015    BUN 41 (H) 12/10/2021    CREATININE 3 19 (H) 12/10/2021    GLUCOSE 203 (H) 09/03/2015    GLUF 164 (H) 12/10/2021    CALCIUM 9 9 12/10/2021    CORRECTEDCA 9 6 10/26/2021    AST 44 12/10/2021    ALT 69 12/10/2021    ALKPHOS 116 12/10/2021    PROT 6 4 07/26/2015    BILITOT 0 50 07/26/2015    EGFR 20 12/10/2021       Lab Results   Component Value Date    IRON 186 (H) 12/10/2021    TIBC 377 12/10/2021    FERRITIN 23 12/10/2021    FERRITIN 40 10/13/2021    FERRITIN 31 06/22/2021    FERRITIN 23 02/17/2021    FERRITIN 52 12/19/2020    FERRITIN 73 09/19/2018       No results found for: [id number]    No results for input(s): WBC, CREAT in the last 72 hours      Invalid input(s):  PLT    By:  Gil Montana MD, 1/10/2022, 9:58 AM

## 2022-01-07 NOTE — PROGRESS NOTES
Laila Sanchez returned my call, he is scheduled to see Dr Zoya De La Fuente on 1/10/22 @ 8:40 to discus Chemo  He voiced understanding

## 2022-01-07 NOTE — PROGRESS NOTES
LM for Ne Pollock asking him to return my call to schedule a follow up with Dr Elaina Garcia to discuss chemotherapy

## 2022-01-10 ENCOUNTER — TELEPHONE (OUTPATIENT)
Dept: RADIOLOGY | Facility: HOSPITAL | Age: 77
End: 2022-01-10

## 2022-01-10 ENCOUNTER — TELEPHONE (OUTPATIENT)
Dept: HEMATOLOGY ONCOLOGY | Facility: HOSPITAL | Age: 77
End: 2022-01-10

## 2022-01-10 ENCOUNTER — OFFICE VISIT (OUTPATIENT)
Dept: HEMATOLOGY ONCOLOGY | Facility: CLINIC | Age: 77
End: 2022-01-10
Payer: MEDICARE

## 2022-01-10 VITALS
WEIGHT: 237 LBS | SYSTOLIC BLOOD PRESSURE: 112 MMHG | TEMPERATURE: 97 F | OXYGEN SATURATION: 98 % | DIASTOLIC BLOOD PRESSURE: 78 MMHG | BODY MASS INDEX: 28.86 KG/M2 | HEART RATE: 82 BPM | HEIGHT: 76 IN | RESPIRATION RATE: 15 BRPM

## 2022-01-10 DIAGNOSIS — C67.3 MALIGNANT NEOPLASM OF ANTERIOR WALL OF URINARY BLADDER (HCC): Primary | ICD-10-CM

## 2022-01-10 PROCEDURE — 99214 OFFICE O/P EST MOD 30 MIN: CPT | Performed by: INTERNAL MEDICINE

## 2022-01-10 RX ORDER — LIDOCAINE AND PRILOCAINE 25; 25 MG/G; MG/G
CREAM TOPICAL AS NEEDED
Qty: 30 G | Refills: 0 | Status: SHIPPED | OUTPATIENT
Start: 2022-01-10

## 2022-01-10 RX ORDER — SODIUM CHLORIDE 9 MG/ML
75 INJECTION, SOLUTION INTRAVENOUS CONTINUOUS
Status: CANCELLED | OUTPATIENT
Start: 2022-01-10

## 2022-01-10 RX ORDER — CEFAZOLIN SODIUM 2 G/50ML
2000 SOLUTION INTRAVENOUS ONCE
Status: CANCELLED | OUTPATIENT
Start: 2022-01-10 | End: 2022-01-10

## 2022-01-10 NOTE — TELEPHONE ENCOUNTER
Called patient and LVM with his 3 week f/u appt date and time  LVM to call hopeline # if this needs to be rescheduled  I LVM that IR would be calling patient to set up appt for port placement  Once I see appt set for port placement I will schedule patients treatment

## 2022-01-10 NOTE — PRE-PROCEDURE INSTRUCTIONS
Pre-procedure Instructions for Interventional Radiology  [address] [place] [id number] Joaquin Junior [phone]    You are scheduled for a[facility]  On Monday 1/17/22  Your tentative arrival time is 0800  Short stay will notify you the day before your procedure with the exact arrival time and the location to arrive  To prepare for your procedure:  1  Please arrange for someone to drive you home after the procedure and stay with you until the next morning if you are instructed to do so  This is typically for patients receiving some type of sedative or anesthetic for the procedure  2  DO NOT EAT OR DRINK ANYTHING after midnight on the evening before your procedure including candy & gum   3  ONLY SIPS OF WATER with your medications are allowed on the morning of your procedure  4  TAKE ALL OF YOUR REGULAR MEDICATIONS THE MORNING OF YOUR PROCEDURE with sips of water! We may call you to stop some of your blood sugar, blood pressure and blood thinning medications depending on the procedure  Please take all of these medications unless we instruct you to stop them  5  If you have an allergy to x-ray dye, please contact Interventional Radiology for an x-ray dye preparation which usually consists of an oral steroid and Benadryl  The day of your procedure:  1  Bring a list of the medications you take at home  2  Bring medications you take for breathing problems (such as inhalers), medications for chest pain, or both  3  Bring a case for your glasses or contacts  4  Bring your insurance card and a form of photo ID   5  Please leave all valuables such as credit cards and jewelry at home  6  Report to the registration desk in [facility] main lobby [facility]  Ask to be directed to [facility]  7  While your procedure is being performed, your family may wait in the Radiology Waiting Room on the 1st floor in Radiology    if they need to leave, they may provide a number to be called following the procedure  8  Be prepared to stay overnight just in case  Sometimes procedures will indicate the need for further observation or treatment  9  If you are scheduled for a follow-up visit with the Interventional Radiologist after your procedure, you will be called with a date and time  10  Covid vaccine completed  Special Instructions (Medications to stop taking before your procedure etc ):  Hold lasix and Novolog insulin AM 1/17/22  Above reviewed with his wife

## 2022-01-10 NOTE — TELEPHONE ENCOUNTER
Called patient and gave patient infusion dates and times and location, Patient is good with the appts  Gave patient f/u appt which patient is good with  I am mailing AVS to patient and a calendar for patient  Reminded patient about bloodwork before his appts

## 2022-01-11 DIAGNOSIS — C67.3 MALIGNANT NEOPLASM OF ANTERIOR WALL OF URINARY BLADDER (HCC): Primary | ICD-10-CM

## 2022-01-12 DIAGNOSIS — C67.3 MALIGNANT NEOPLASM OF ANTERIOR WALL OF URINARY BLADDER (HCC): Primary | ICD-10-CM

## 2022-01-12 NOTE — PROGRESS NOTES
Dose for CADD was written for 96 hours for 5 days per pharmacy  Pharmacy wanted to clarify if the CADD is to run over 4 or 5 days  Reviewed with Dr Gilma Reyes  It should be over 5 days  Plan updated to reflect that

## 2022-01-14 ENCOUNTER — APPOINTMENT (OUTPATIENT)
Dept: RADIATION ONCOLOGY | Facility: HOSPITAL | Age: 77
End: 2022-01-14
Attending: INTERNAL MEDICINE
Payer: MEDICARE

## 2022-01-14 PROCEDURE — 77300 RADIATION THERAPY DOSE PLAN: CPT | Performed by: INTERNAL MEDICINE

## 2022-01-14 PROCEDURE — 77301 RADIOTHERAPY DOSE PLAN IMRT: CPT | Performed by: INTERNAL MEDICINE

## 2022-01-14 PROCEDURE — 77338 DESIGN MLC DEVICE FOR IMRT: CPT | Performed by: INTERNAL MEDICINE

## 2022-01-17 ENCOUNTER — HOSPITAL ENCOUNTER (OUTPATIENT)
Dept: RADIOLOGY | Facility: HOSPITAL | Age: 77
Discharge: HOME | End: 2022-01-17
Attending: INTERNAL MEDICINE | Admitting: RADIOLOGY
Payer: MEDICARE

## 2022-01-17 ENCOUNTER — TELEPHONE (OUTPATIENT)
Dept: HEMATOLOGY ONCOLOGY | Facility: CLINIC | Age: 77
End: 2022-01-17

## 2022-01-17 VITALS
RESPIRATION RATE: 15 BRPM | WEIGHT: 237 LBS | HEIGHT: 75 IN | HEART RATE: 67 BPM | DIASTOLIC BLOOD PRESSURE: 91 MMHG | TEMPERATURE: 97.9 F | BODY MASS INDEX: 29.47 KG/M2 | OXYGEN SATURATION: 100 % | SYSTOLIC BLOOD PRESSURE: 145 MMHG

## 2022-01-17 DIAGNOSIS — C67.3 MALIGNANT NEOPLASM OF ANTERIOR WALL OF URINARY BLADDER (HCC): ICD-10-CM

## 2022-01-17 PROCEDURE — 36561 INSERT TUNNELED CV CATH: CPT

## 2022-01-17 PROCEDURE — 76937 US GUIDE VASCULAR ACCESS: CPT | Performed by: RADIOLOGY

## 2022-01-17 PROCEDURE — 77001 FLUOROGUIDE FOR VEIN DEVICE: CPT

## 2022-01-17 PROCEDURE — C1788 PORT, INDWELLING, IMP: HCPCS

## 2022-01-17 PROCEDURE — 76937 US GUIDE VASCULAR ACCESS: CPT

## 2022-01-17 PROCEDURE — 36561 INSERT TUNNELED CV CATH: CPT | Performed by: RADIOLOGY

## 2022-01-17 PROCEDURE — 77001 FLUOROGUIDE FOR VEIN DEVICE: CPT | Performed by: RADIOLOGY

## 2022-01-17 PROCEDURE — 99152 MOD SED SAME PHYS/QHP 5/>YRS: CPT

## 2022-01-17 PROCEDURE — 99153 MOD SED SAME PHYS/QHP EA: CPT

## 2022-01-17 PROCEDURE — 99152 MOD SED SAME PHYS/QHP 5/>YRS: CPT | Performed by: RADIOLOGY

## 2022-01-17 RX ORDER — FENTANYL CITRATE 50 UG/ML
INJECTION, SOLUTION INTRAMUSCULAR; INTRAVENOUS CODE/TRAUMA/SEDATION MEDICATION
Status: COMPLETED | OUTPATIENT
Start: 2022-01-17 | End: 2022-01-17

## 2022-01-17 RX ORDER — MIDAZOLAM HYDROCHLORIDE 2 MG/2ML
INJECTION, SOLUTION INTRAMUSCULAR; INTRAVENOUS CODE/TRAUMA/SEDATION MEDICATION
Status: COMPLETED | OUTPATIENT
Start: 2022-01-17 | End: 2022-01-17

## 2022-01-17 RX ORDER — SODIUM CHLORIDE 9 MG/ML
75 INJECTION, SOLUTION INTRAVENOUS CONTINUOUS
Status: DISCONTINUED | OUTPATIENT
Start: 2022-01-17 | End: 2022-01-17 | Stop reason: HOSPADM

## 2022-01-17 RX ORDER — CEFAZOLIN SODIUM 2 G/50ML
2000 SOLUTION INTRAVENOUS ONCE
Status: COMPLETED | OUTPATIENT
Start: 2022-01-17 | End: 2022-01-17

## 2022-01-17 RX ADMIN — FENTANYL CITRATE 50 MCG: 50 INJECTION INTRAMUSCULAR; INTRAVENOUS at 09:20

## 2022-01-17 RX ADMIN — CEFAZOLIN SODIUM 2000 MG: 2 SOLUTION INTRAVENOUS at 09:04

## 2022-01-17 RX ADMIN — MIDAZOLAM 1 MG: 1 INJECTION INTRAMUSCULAR; INTRAVENOUS at 09:07

## 2022-01-17 RX ADMIN — SODIUM CHLORIDE 75 ML/HR: 0.9 INJECTION, SOLUTION INTRAVENOUS at 08:37

## 2022-01-17 RX ADMIN — FENTANYL CITRATE 50 MCG: 50 INJECTION INTRAMUSCULAR; INTRAVENOUS at 09:08

## 2022-01-17 RX ADMIN — FENTANYL CITRATE 50 MCG: 50 INJECTION INTRAMUSCULAR; INTRAVENOUS at 09:25

## 2022-01-17 RX ADMIN — MIDAZOLAM 1 MG: 1 INJECTION INTRAMUSCULAR; INTRAVENOUS at 09:19

## 2022-01-17 RX ADMIN — Medication 20 ML: at 09:20

## 2022-01-17 NOTE — BRIEF OP NOTE (RAD/CATH)
INTERVENTIONAL RADIOLOGY PROCEDURE NOTE    Date: 1/17/2022    Procedure: IR PORT PLACEMENT     Preoperative diagnosis:   1  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)         Postoperative diagnosis: Same  Surgeon: Oscar Osborne MD     Assistant: None  No qualified resident was available  Blood loss: None    Specimens: None    Findings: Successful port placement    Complications: None immediate      Anesthesia: conscious sedation

## 2022-01-17 NOTE — DISCHARGE INSTRUCTIONS
Implanted Venous Access Port     WHAT YOU NEED TO KNOW:   An implanted venous access port is a device used to give treatments and take blood  It may also be called a central venous access device (CVAD)  The port is a small container that is placed under your skin, usually in your upper chest  The port is attached to a catheter that enters a large vein  DISCHARGE INSTRUCTIONS:   Resume your normal diet  Small sips of flat soda will help with mild nausea  Prevent an infection:   · Wash your hands often  Use soap and water  Clean your hands before and after you care for your port  Remind everyone who cares for your port to wash their hands  · Check your skin for infection every day  Look for redness, swelling, or fluid oozing from the port site  Care for your port:   1  You may shower beginning 48 hours after procedure  2  Change dressing if it becomes wet  3  Remove dressing after 24 hours  Leave glue in place  4  It is normal for some bruising to occur  5  Use Tylenol for pain  6  Limit use of arm on the side that your port was placed  Lift nothing heavier than 5 pounds for 1 week, and then gradually increase activity as tolerated  7  DO NOT apply ointment, lotion or cream to port site until incision is healed  Allow glue to fall off  DO NOT attempt to peel glue from skin even it it begins to flake  8, After the port incision is healed you may swim, bathe  Notify the Interventional Radiologist if you have any of the followin  Fever above 101 F    2  Increased redness or swelling after 1st day  3  Increased pain after 1st day  4  Any sign of infection (drainage from port site, skin separation, hot to touch)  5  Persistent nausea or vomiting  Contact Interventional Radiology at [phone] [facility] PATIENTS: Contact Interventional Radiology at [phone]) ([address] [facility] [address]: Contact Interventional Radiology at [phone])          Procedural Sedation   WHAT YOU NEED TO KNOW:   Procedural sedation is medicine used during procedures to help you feel relaxed and calm  You will remember little to none of the procedure  After sedation you may feel tired, weak, or unsteady on your feet  You may also have trouble concentrating or short-term memory loss  These symptoms should go away in 24 hours or less  DISCHARGE INSTRUCTIONS:     Call 911 or have someone else call for any of the following:   · You have sudden trouble breathing      · You cannot be woken  Contact Interventional Radiology at [phone]   [place] PATIENTS: Contact Interventional Radiology at [phone]) [facility] PATIENTS: Contact Interventional Radiology at [phone]) if any of the following occur:     · You have a severe headache or dizziness      · Your heart is beating faster than usual     · You have a fever or chills      · Your skin is itchy, swollen, or you have a rash      · You have nausea or are vomiting for more than 8 hours after the procedure       · You have questions or concerns about your condition or care  Self-care:   · Have someone stay with you for 24 hours  This person can drive you to errands and help you do things around the house  This person can also watch for problems       · Rest and do quiet activities for 24 hours  Do not exercise, ride a bike, or play sports  Stand up slowly to prevent dizziness and falls  Take short walks around the house with another person  Slowly return to your usual activities the next day       · Do not drive or use dangerous machines or tools for 24 hours  You may injure yourself or others  Examples include a lawnmower, saw, or drill  Do not return to work for 24 hours if you use dangerous machines or tools for work       · Do not make important decisions for 24 hours  For example, do not sign important papers or invest money       · Drink liquids as directed  Liquids help flush the sedation medicine out of your body   Ask how much liquid to drink each day and which liquids are best for you       · Eat small, frequent meals to prevent nausea and vomiting  Start with clear liquids such as juice or broth  If you do not vomit after clear liquids, you can eat your usual foods       · Do not drink alcohol or take medicines that make you drowsy  This includes medicines that help you sleep and anxiety medicines  Ask your healthcare provider if it is safe for you to take pain medicine  Follow up with your healthcare provider as directed: Write down your questions so you remember to ask them during your visits

## 2022-01-17 NOTE — SEDATION DOCUMENTATION
Port placed to right chest, RIJ vein without complications by Dr Marie Kaur  Procedure tolerated well by patient, VSS

## 2022-01-18 ENCOUNTER — HOSPITAL ENCOUNTER (OUTPATIENT)
Dept: INFUSION CENTER | Facility: HOSPITAL | Age: 77
Discharge: HOME | End: 2022-01-18
Attending: INTERNAL MEDICINE

## 2022-01-18 DIAGNOSIS — C67.3 MALIGNANT NEOPLASM OF ANTERIOR WALL OF URINARY BLADDER (HCC): Primary | ICD-10-CM

## 2022-01-20 ENCOUNTER — APPOINTMENT (OUTPATIENT)
Dept: LAB | Facility: CLINIC | Age: 77
End: 2022-01-20
Payer: MEDICARE

## 2022-01-20 DIAGNOSIS — C67.3 MALIGNANT NEOPLASM OF ANTERIOR WALL OF URINARY BLADDER (HCC): ICD-10-CM

## 2022-01-20 LAB
ALBUMIN SERPL BCP-MCNC: 3.4 G/DL (ref 3.5–5)
ALP SERPL-CCNC: 208 U/L (ref 46–116)
ALT SERPL W P-5'-P-CCNC: 128 U/L (ref 12–78)
ANION GAP SERPL CALCULATED.3IONS-SCNC: 3 MMOL/L (ref 4–13)
AST SERPL W P-5'-P-CCNC: 58 U/L (ref 5–45)
BASOPHILS # BLD AUTO: 0.04 THOUSANDS/ΜL (ref 0–0.1)
BASOPHILS NFR BLD AUTO: 1 % (ref 0–1)
BILIRUB SERPL-MCNC: 0.53 MG/DL (ref 0.2–1)
BUN SERPL-MCNC: 43 MG/DL (ref 5–25)
CALCIUM ALBUM COR SERPL-MCNC: 9.4 MG/DL (ref 8.3–10.1)
CALCIUM SERPL-MCNC: 8.9 MG/DL (ref 8.3–10.1)
CHLORIDE SERPL-SCNC: 105 MMOL/L (ref 100–108)
CO2 SERPL-SCNC: 29 MMOL/L (ref 21–32)
CREAT SERPL-MCNC: 3.42 MG/DL (ref 0.6–1.3)
EOSINOPHIL # BLD AUTO: 0.29 THOUSAND/ΜL (ref 0–0.61)
EOSINOPHIL NFR BLD AUTO: 4 % (ref 0–6)
ERYTHROCYTE [DISTWIDTH] IN BLOOD BY AUTOMATED COUNT: 13.2 % (ref 11.6–15.1)
GFR SERPL CREATININE-BSD FRML MDRD: 16 ML/MIN/1.73SQ M
GLUCOSE P FAST SERPL-MCNC: 181 MG/DL (ref 65–99)
HCT VFR BLD AUTO: 33.1 % (ref 36.5–49.3)
HGB BLD-MCNC: 10.9 G/DL (ref 12–17)
IMM GRANULOCYTES # BLD AUTO: 0.06 THOUSAND/UL (ref 0–0.2)
IMM GRANULOCYTES NFR BLD AUTO: 1 % (ref 0–2)
LYMPHOCYTES # BLD AUTO: 2.25 THOUSANDS/ΜL (ref 0.6–4.47)
LYMPHOCYTES NFR BLD AUTO: 33 % (ref 14–44)
MCH RBC QN AUTO: 30.4 PG (ref 26.8–34.3)
MCHC RBC AUTO-ENTMCNC: 32.9 G/DL (ref 31.4–37.4)
MCV RBC AUTO: 93 FL (ref 82–98)
MONOCYTES # BLD AUTO: 0.54 THOUSAND/ΜL (ref 0.17–1.22)
MONOCYTES NFR BLD AUTO: 8 % (ref 4–12)
NEUTROPHILS # BLD AUTO: 3.56 THOUSANDS/ΜL (ref 1.85–7.62)
NEUTS SEG NFR BLD AUTO: 53 % (ref 43–75)
NRBC BLD AUTO-RTO: 0 /100 WBCS
PLATELET # BLD AUTO: 175 THOUSANDS/UL (ref 149–390)
PMV BLD AUTO: 11.2 FL (ref 8.9–12.7)
POTASSIUM SERPL-SCNC: 5.4 MMOL/L (ref 3.5–5.3)
PROT SERPL-MCNC: 7.6 G/DL (ref 6.4–8.2)
RBC # BLD AUTO: 3.58 MILLION/UL (ref 3.88–5.62)
SODIUM SERPL-SCNC: 137 MMOL/L (ref 136–145)
WBC # BLD AUTO: 6.74 THOUSAND/UL (ref 4.31–10.16)

## 2022-01-20 PROCEDURE — 80053 COMPREHEN METABOLIC PANEL: CPT

## 2022-01-20 PROCEDURE — 85025 COMPLETE CBC W/AUTO DIFF WBC: CPT

## 2022-01-20 PROCEDURE — 36415 COLL VENOUS BLD VENIPUNCTURE: CPT

## 2022-01-24 ENCOUNTER — APPOINTMENT (OUTPATIENT)
Dept: RADIATION ONCOLOGY | Facility: HOSPITAL | Age: 77
End: 2022-01-24
Attending: INTERNAL MEDICINE
Payer: MEDICARE

## 2022-01-24 ENCOUNTER — HOSPITAL ENCOUNTER (OUTPATIENT)
Dept: INFUSION CENTER | Facility: HOSPITAL | Age: 77
Discharge: HOME | End: 2022-01-24
Attending: INTERNAL MEDICINE
Payer: MEDICARE

## 2022-01-24 VITALS
DIASTOLIC BLOOD PRESSURE: 53 MMHG | HEART RATE: 70 BPM | TEMPERATURE: 97.7 F | HEIGHT: 72 IN | WEIGHT: 234.13 LBS | BODY MASS INDEX: 31.71 KG/M2 | SYSTOLIC BLOOD PRESSURE: 122 MMHG | RESPIRATION RATE: 18 BRPM

## 2022-01-24 DIAGNOSIS — C67.3 MALIGNANT NEOPLASM OF ANTERIOR WALL OF URINARY BLADDER (HCC): Primary | ICD-10-CM

## 2022-01-24 DIAGNOSIS — D09.0 CARCINOMA IN SITU OF BLADDER: Primary | ICD-10-CM

## 2022-01-24 LAB — GLUCOSE SERPL-MCNC: 186 MG/DL (ref 65–140)

## 2022-01-24 PROCEDURE — 96367 TX/PROPH/DG ADDL SEQ IV INF: CPT

## 2022-01-24 PROCEDURE — G0498 CHEMO EXTEND IV INFUS W/PUMP: HCPCS

## 2022-01-24 PROCEDURE — 82948 REAGENT STRIP/BLOOD GLUCOSE: CPT

## 2022-01-24 PROCEDURE — 96409 CHEMO IV PUSH SNGL DRUG: CPT

## 2022-01-24 RX ORDER — MITOMYCIN 5 MG/10ML
3 INJECTION, POWDER, LYOPHILIZED, FOR SOLUTION INTRAVENOUS ONCE
Status: COMPLETED | OUTPATIENT
Start: 2022-01-24 | End: 2022-01-24

## 2022-01-24 RX ORDER — SODIUM CHLORIDE 9 MG/ML
20 INJECTION, SOLUTION INTRAVENOUS ONCE
Status: COMPLETED | OUTPATIENT
Start: 2022-01-24 | End: 2022-01-24

## 2022-01-24 RX ADMIN — MITOMYCIN 7.2 MG: 5 INJECTION, POWDER, LYOPHILIZED, FOR SOLUTION INTRAVENOUS at 13:07

## 2022-01-24 RX ADMIN — SODIUM CHLORIDE 20 ML/HR: 0.9 INJECTION, SOLUTION INTRAVENOUS at 12:39

## 2022-01-24 RX ADMIN — ONDANSETRON: 2 SOLUTION INTRAMUSCULAR; INTRAVENOUS at 12:38

## 2022-01-24 NOTE — PROGRESS NOTES
Pt completed day 1 chemo, tolerated well  Very somnolent throughout time in infusion  Mitomycin tolerated well, CADD intact running and blinking green upon DC home  Pt unsteady on feet immediately upon rising at end of treatment  Assisted to nearest chair, BP as charted 122/53, blood sugar 186  Symptoms resolved quickly and pt walking in hallway and to elevator with no further incidents of instability  Aware to return Saturday 1/29/22 @1330 for [address]  [facility] info given and CADD reviewed along with care of port while accessed  Pt reports understanding   Denies AVS

## 2022-01-25 ENCOUNTER — APPOINTMENT (OUTPATIENT)
Dept: RADIATION ONCOLOGY | Facility: HOSPITAL | Age: 77
End: 2022-01-25
Attending: INTERNAL MEDICINE
Payer: MEDICARE

## 2022-01-25 ENCOUNTER — APPOINTMENT (OUTPATIENT)
Dept: LAB | Facility: CLINIC | Age: 77
End: 2022-01-25
Payer: MEDICARE

## 2022-01-25 DIAGNOSIS — N39.0 RECURRENT UTI: ICD-10-CM

## 2022-01-25 DIAGNOSIS — D09.0 CARCINOMA IN SITU OF BLADDER: ICD-10-CM

## 2022-01-25 DIAGNOSIS — N18.4 CHRONIC KIDNEY DISEASE, STAGE IV (SEVERE) (HCC): ICD-10-CM

## 2022-01-25 LAB
ALBUMIN SERPL BCP-MCNC: 3.7 G/DL (ref 3.5–5)
ALP SERPL-CCNC: 163 U/L (ref 46–116)
ALT SERPL W P-5'-P-CCNC: 99 U/L (ref 12–78)
ANION GAP SERPL CALCULATED.3IONS-SCNC: 5 MMOL/L (ref 4–13)
AST SERPL W P-5'-P-CCNC: 44 U/L (ref 5–45)
BACTERIA UR QL AUTO: ABNORMAL /HPF
BASOPHILS # BLD AUTO: 0.03 THOUSANDS/ΜL (ref 0–0.1)
BASOPHILS NFR BLD AUTO: 0 % (ref 0–1)
BILIRUB SERPL-MCNC: 0.77 MG/DL (ref 0.2–1)
BILIRUB UR QL STRIP: NEGATIVE
BUN SERPL-MCNC: 51 MG/DL (ref 5–25)
CALCIUM SERPL-MCNC: 8.7 MG/DL (ref 8.3–10.1)
CHLORIDE SERPL-SCNC: 103 MMOL/L (ref 100–108)
CLARITY UR: ABNORMAL
CO2 SERPL-SCNC: 26 MMOL/L (ref 21–32)
COLOR UR: YELLOW
CREAT SERPL-MCNC: 3.57 MG/DL (ref 0.6–1.3)
EOSINOPHIL # BLD AUTO: 0.08 THOUSAND/ΜL (ref 0–0.61)
EOSINOPHIL NFR BLD AUTO: 1 % (ref 0–6)
ERYTHROCYTE [DISTWIDTH] IN BLOOD BY AUTOMATED COUNT: 13.5 % (ref 11.6–15.1)
GFR SERPL CREATININE-BSD FRML MDRD: 15 ML/MIN/1.73SQ M
GLUCOSE P FAST SERPL-MCNC: 202 MG/DL (ref 65–99)
GLUCOSE UR STRIP-MCNC: NEGATIVE MG/DL
HCT VFR BLD AUTO: 32.1 % (ref 36.5–49.3)
HGB BLD-MCNC: 10.9 G/DL (ref 12–17)
HGB UR QL STRIP.AUTO: ABNORMAL
HYALINE CASTS #/AREA URNS LPF: ABNORMAL /LPF
IMM GRANULOCYTES # BLD AUTO: 0.05 THOUSAND/UL (ref 0–0.2)
IMM GRANULOCYTES NFR BLD AUTO: 1 % (ref 0–2)
KETONES UR STRIP-MCNC: NEGATIVE MG/DL
LEUKOCYTE ESTERASE UR QL STRIP: ABNORMAL
LYMPHOCYTES # BLD AUTO: 2.37 THOUSANDS/ΜL (ref 0.6–4.47)
LYMPHOCYTES NFR BLD AUTO: 29 % (ref 14–44)
MCH RBC QN AUTO: 30.9 PG (ref 26.8–34.3)
MCHC RBC AUTO-ENTMCNC: 34 G/DL (ref 31.4–37.4)
MCV RBC AUTO: 91 FL (ref 82–98)
MONOCYTES # BLD AUTO: 0.73 THOUSAND/ΜL (ref 0.17–1.22)
MONOCYTES NFR BLD AUTO: 9 % (ref 4–12)
NEUTROPHILS # BLD AUTO: 5.07 THOUSANDS/ΜL (ref 1.85–7.62)
NEUTS SEG NFR BLD AUTO: 60 % (ref 43–75)
NITRITE UR QL STRIP: NEGATIVE
NON-SQ EPI CELLS URNS QL MICRO: ABNORMAL /HPF
NRBC BLD AUTO-RTO: 0 /100 WBCS
PH UR STRIP.AUTO: 6 [PH]
PLATELET # BLD AUTO: 195 THOUSANDS/UL (ref 149–390)
PMV BLD AUTO: 10.9 FL (ref 8.9–12.7)
POTASSIUM SERPL-SCNC: 4.9 MMOL/L (ref 3.5–5.3)
PROT SERPL-MCNC: 7.8 G/DL (ref 6.4–8.2)
PROT UR STRIP-MCNC: ABNORMAL MG/DL
RBC # BLD AUTO: 3.53 MILLION/UL (ref 3.88–5.62)
RBC #/AREA URNS AUTO: ABNORMAL /HPF
SODIUM SERPL-SCNC: 134 MMOL/L (ref 136–145)
SP GR UR STRIP.AUTO: 1.02 (ref 1–1.03)
UROBILINOGEN UR QL STRIP.AUTO: 0.2 E.U./DL
WBC # BLD AUTO: 8.33 THOUSAND/UL (ref 4.31–10.16)
WBC #/AREA URNS AUTO: ABNORMAL /HPF

## 2022-01-25 PROCEDURE — 36415 COLL VENOUS BLD VENIPUNCTURE: CPT

## 2022-01-25 PROCEDURE — 85025 COMPLETE CBC W/AUTO DIFF WBC: CPT

## 2022-01-25 PROCEDURE — 87077 CULTURE AEROBIC IDENTIFY: CPT

## 2022-01-25 PROCEDURE — 87086 URINE CULTURE/COLONY COUNT: CPT

## 2022-01-25 PROCEDURE — 81001 URINALYSIS AUTO W/SCOPE: CPT

## 2022-01-25 PROCEDURE — 80053 COMPREHEN METABOLIC PANEL: CPT

## 2022-01-25 PROCEDURE — 87186 SC STD MICRODIL/AGAR DIL: CPT

## 2022-01-26 ENCOUNTER — APPOINTMENT (OUTPATIENT)
Dept: RADIATION ONCOLOGY | Facility: HOSPITAL | Age: 77
End: 2022-01-26
Attending: INTERNAL MEDICINE
Payer: MEDICARE

## 2022-01-26 NOTE — PROGRESS NOTES
Hematology/Oncology Outpatient Office Note    Date of Service: 2022    [facility] HEMATOLOGY ONCOLOGY SPECIALISTS STEPHENMARYBETH Castillonorman  [facility]  [phone]    Reason for Consultation:   Chief Complaint   Patient presents with    Follow-up       Cancer Stage at diagnosis: II    Referral Physician: No ref  provider found    Primary Care Physician:  Carmen Goode MD     Nickname: José Antoniokarishma Don    Spouse: Reema Bolaños    Granddaughter: Yolanda Number     Original ECO    Today's ECO    Goals and Barriers:  Current Goal: Minimize effects of disease burden, extend life  Barriers to accomplishing this: fatigue, depression, anxiety, worry, L foot claudication, lower back pain from spinal stenosis and uses a cane a lot of the time    Patient's Capacity to Self Care:  Patient is able to self care    Code Status: Full code    Advanced directives: not on file but I recommended he get that done    ASSESSMENT & PLAN      Diagnosis ICD-10-CM Associated Orders   1  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)  C67 3 CT chest abdomen pelvis wo contrast       This is a 64 y o  c PMHx notable for spinal stenosis, Gastric bypass (), 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, diabetic nephropathy, and renal vascular disease, DM, being seen in consultation for bladder cancer       From an overall performance status basis, I believe [facility] can tolerate systemic treatment due to an ECOG PS of 2  A shared decision making approach was employed during today's visit  Thoughts on approach to systemic therapy in the first line and subsequent lines of therapy:    My favored first line is cisplatin/ gemcitabine however the patient's overall performance status and kidney function is not amenable to this      Thoughts on prediction for Grade III-V toxicity in elderly patients to systemic chemotherapy:  Age >72 years   GI/ cancers  Falls in last 6 months   Hearing impairment (b/l hearing aids)  Limited ability to walk 1 block  Requires assistance with medications  Decreased social activities   Grover Jackson JCO 2011)  BMI<18 5 or moderate or severe weight loss or decrease in food intake in past 3 months  Mild vs moderate dementia/depression     The above 5 factors predict toxicity for the patient if he undergoes systemic chemotherapy and I discussed this with the patient  For unresectable cases: ICI can be employed if there is POD after definitive CRT    Discussion of disease response monitoring: We discussed with the patient at length the role of imaging and potential blood monitoring of burden of disease  We will opt to get CT chest, abdomen, pelvis without contrast due to kidney function as surveillance following conclusion of therapy  Should there be progression of disease, our plan is to employ repeat biopsy(ies) to assess actionable biomarkers and determine updated bio-marker status  Scenario for definitive CRT (non-surgical candidate): My plan is to incorporate 5-FU (During days 1-5 and 16-20 of RT) + Mitomycin (day 1 of each cycle)  This is based on:    Multicenter, phase III study where the regimen consisted of fluorouracil (500 mg per square meter of body-surface area per day) during fractions 1 to 5 and 16 to 20 of radiotherapy and mitomycin C (12 mg per square meter) on day 1  Patients were also randomly assigned to undergo either whole-bladder radiotherapy or modified-volume radiotherapy (in which the volume of bladder receiving full-dose radiotherapy was reduced)  2 year locoregional disease-free survival were 67% (95% confidence interval [CI], 59 to 74) in the chemoradiotherapy group and 54% (95% CI, 46 to 62) in the radiotherapy group  Five-year rates of overall survival were 48% (95% CI, 40 to 55) in the chemoradiotherapy group and 35% (95% CI, 28 to 43) in the radiotherapy group (hazard ratio, 0 82; 95% CI, 0 63 to 1 09; P=0 16)   Grade 3 or 4 adverse events were slightly more common in the chemoradiotherapy group than in the radiotherapy group during treatment (36 0% vs  27 5%, P=0 07) but not during follow-up (8 3% vs  15 7%, P=0 07)  Possible side effect such as renal insufficiency, nausea, vomiting, dehydration, allergic reaction, neuropathy, hearing loss, thrombocytopenia, anemia, infection, and death conveyed to the patient  The patient is advised to stay hydrated by consuming a lot of water and electrolyte-rich fluids (>2L) throughout the day  1/27/22 pt only got 1/2 of Days 1-5 5-FU due to contents spilling  Discussion of decision making   Oncology history updated, accordingly, during this visit   Goals of care/patient communication  o I discussed with the patient the clinical course leading up to their cancer diagnosis  I reviewed relevant office notes, imaging reports and pathology result as well   o I told the patient that this is a case of potentially curable disease and what this means  We discussed that the goal of anti-cancer therapy is to provide best quality of life, extend overall survival, and progression free survival as shown in clinical trials   We also discussed that there might be a point when the cancer will no longer respond to this anti-neoplastic therapy    o I explained the risks/benefits of the proposed cancer therapy: 5-FU+Mitomycin + RT and after discussion including understanding risks of possible life-threatening complications and therapy-related malignancy development, informed consent for treatment has been signed   TNM/Staging At Diagnosis  Cancer Staging  Malignant neoplasm of anterior wall of urinary bladder ([facility] [address] )  Staging form: Urinary Bladder, AJCC 8th Edition  - Clinical stage from 10/18/2021: Stage II (cT2, cN0, cM0) - Signed by Gil Montana MD on 11/17/2021  Stage prefix: Initial diagnosis  o    Disease Features/Tumor Markers/Genetics  o Tumor Marker:   o Notable Path Features: anterior wall Invasive high-grade urothelial carcinoma  Carcinoma invades muscularis propria (detrusor muscle)    Treatment: 5-FU + MMT (Doing MTC dose reduction 75% due to creat clearance 19 1)   Other Supportive care:    Treatment Team Members  o Surgeon: Dr Edi Crook: Dr Scott Mckinney  o Palliative: Miriam Osborn: creat 2 81 (eGFR 24), normocytic anemia, Hgb 9 8   Diagnostics: 11/11/21 CT CAP w/o c: 1  No metastatic disease within the limitations of noncontrast technique in the chest abdomen or pelvis  2   Diffuse hemicircumferential smooth bladder wall thickening on the left, similar to the prior study when allowing for differences in bladder distention  Prostatomegaly    Port is without acute issues  Discussion of decision making    I personally communicated with Dr Scott Mckinney on this case and have reviewed the following lab results, the image studies, pathology, other specialty/physicians consult notes and recommendations, and outside medical records from [facility]  I had a lengthy discussion with the patient and shared the work-up findings  We discussed the diagnosis and management plan as below  I spent 31 minutes reviewing the records (labs, clinician notes, outside records, medical history, ordering medicine/tests/procedures, interpreting the imaging/labs previously done) and coordination of care as well as direct time with the patient today, of which greater than 50% of the time was spent in counseling and coordination of care with the patient/family  · Plan/Labs  · Candidate for genetic testing? NCCN criteria to be met  · Continue to assess for signs of distress as he has anxiety/depression worsened from baseline  Can consider palliative care referral if this worsens  · F/u Rad Onc and RT scheduled (20 fractions planned)  · Restaging CT CAP 4/25/22 to be ordered today without contrast (falling within 2-3 months after completion of cancer treatment as per NCCN guidelines)  · Cont Abx for underlying UTI  Pt with instructions to contact Dr Rowena Favre office if hematuria worsens  · Infusion center appointments and lab appointments for 5-FU + MMT in place to coincide with RT  Continue treatment, upcoming C1 D29 for his 5-FU        Follow Up: 3 weeks (then see again at time after restaging CT CAP)    All questions were answered to the patient's satisfaction during this encounter  The patient knows the contact information for our office and knows to reach out for any relevant concerns related to this encounter  They are to call for any temperature 100 4 or higher, new symptoms including but not restricted to shaking chills, decreased appetite, nausea, vomiting, diarrhea, increased fatigue, shortness of breath or chest pain, confusion, and not feeling the strength to come to the clinic  For all other listed problems and medical diagnosis in their chart - they are managed by PCP and/or other specialists, which the patient acknowledges  Thank you very much for your consultation and making us a part of this patient's care  We are continuing to follow closely with you  Please do not hesitate to reach out to me with any additional questions or concerns  Carol Duque MD  Hematology & Medical Oncology Staff Physician             Disclaimer: This document was prepared using [employer] Direct technology  If a word or phrase is confusing, or does not make sense, this is likely due to recognition error which was not discovered during this clinician's review  If you believe an error has occurred, please contact me through [address] [place] line for godfrey? 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)    Initial Diagnosis    Malignant neoplasm of anterior wall of urinary bladder ([facility] [address] )     1994 Surgery    TURBT      1994 - 1995 Chemotherapy    Induction intravesical BCG x 2      8/17/2016 Surgery    TURBT      12/13/2016 Surgery    TURBT [facility])    Bladder, left posterior wall, biopsy: High-grade urothelial carcinoma in-situ  Muscularis propria is identified with no tumor seen  Bladder, trigone, biopsy: Non-invasive high-grade papillary urothelial carcinoma, and flat urothelial carcinoma in-situ  Muscularis propria is not identified  1/4/2017 - 2/8/2017 Chemotherapy    Induction intravesical BCG     6/19/2017 Surgery    TURBT  [facility])    - Posterior wall, biopsy: Mild chronic cystitis with focal urothelial atypia  Muscularis propria is identified      - Right lateral wall, biopsy: Granulomatous cystitis  Muscularis propria is identified  - Left lateral wall, biopsy: Non invasive high-grade urothelial carcinoma  Muscularis propria is not identified  4/26/2019 Surgery    TURBT   [facility])     - bladder, right posterior wall, biopsy: Urothelial carcinoma in situ   - bladder, right lateral wall, biopsy: Small focus of urothelial carcinoma in situ  - bladder, left posterior wall, biopsy: Urothelial carcinoma in situ   - bladder, left lateral wall, biopsy: Urothelial carcinoma in situ     - bladder, trigone, biopsy: Invasive high-grade urothelial carcinoma, invading into lamina propria  No lymphovascular invasion seen  Muscularis propria not present  Separate piece showing urothelial carcinoma in situ        7/2019 -  Chemotherapy    Induction intravesical BCG x 4      11/4/2019 Surgery    TURBT  [facility])    - Superficial bladder tumor, transurethral resection: Non-invasive high grade urothelial carcinoma, with urothelial carcinoma in situ  Muscularis propria is not identified      - Bladder tumor, transurethral resection: Non-invasive high grade urothelial carcinoma, with urothelial carcinoma in situ, see note  Muscularis propria is present and free of tumor          12/9/2019 - 1/28/2020 Chemotherapy    Induction intravesical BCG+INF        6/9/2020 - 6/23/2020 Chemotherapy    Maintenance intravesical BCG+INF 11/19/2020 Biopsy    TURBT (Claudetta Hay, Dr Keshav Pacheco)    A  Urinary Bladder, Left Posterior Bladder Wall:  -Extensively- denuded urothelial lined mucosa with mild chronic inflammation, vascular congestion  And edema   -Unremarkable small fragment of detrusor muscle present  -No evidence of invasive urothelial carcinoma seen     B  Urinary Bladder, Left lateral bladder Wall:  -Partially-denuded urothelial lined mucosa with mild  chronic inflammation  -Unremarkable small fragment of detrusor muscle present  -No evidence of invasive urothelial carcinoma seen     C  Urinary Bladder, Right Posterior Bladder wall:  -Urothelial lined mucosa with mild  chronic inflammation Von Brunn nests and mild urothelial atypia, possibly due to previous BCG administration   -Unremarkable small fragment of detrusor muscle present  -No evidence of invasive urothelial carcinoma seen     D  Urinary Bladder, Right Lateral Bladder Wall:  - Urothelial lined mucosa with mild  chronic inflammation   -Muscularis propria/ detrusor muscle is not present for evaluation    -No evidence of invasive urothelial carcinoma seen  E  Prostate, Prostate Tissue:  -Urothelial lined mucosa with mild chronic inflammation, prominent Von Brunn nests and Cystitis cystica   -Unremarkable small fragment of detrusor muscle present   -No evidence of malignancy seen  10/18/2021 -  Cancer Staged    Staging form: Urinary Bladder, AJCC 8th Edition  - Clinical stage from 10/18/2021: Stage II (cT2, cN0, cM0) - Signed by Sonali Ruiz MD on 11/17/2021  Stage prefix: Initial diagnosis       [facility]20[facility]    A  Left posterior wall, bladder (transurethral resection):     - Urothelial tissue with small atypical cauterized focus favored to represent superficially invasive high-grade urothelial carcinoma  - Muscularis propria (detrusor muscle) present, and negative for carcinoma        - Marked edema and mucosal denudation with thermal artifact noted  B  Anterior wall, bladder (transurethral resection):      - Invasive high-grade urothelial carcinoma  - Carcinoma invades muscularis propria (detrusor muscle)  C   Left lateral wall, bladder (transurethral resection):     - Urothelial tissue with small atypical focus with marked thermal artifact; cannot exclude superficial carcinoma  - Muscularis propria (detrusor muscle) present, and negative for carcinoma  - Marked edema and mucosal denudation with thermal artifact noted     1/24/2022 -  Chemotherapy    mitoMYcin (MUTAMYCIN), 12 mg/m2 = 28 7 mg (100 % of original dose 12 mg/m2), Intravenous, Once, 1 of 1 cycle  Dose modification: 12 mg/m2 (original dose 12 mg/m2, Cycle 1), 3 mg/m2 (original dose 12 mg/m2, Cycle 1)  Administration: 7 2 mg (1/24/2022)  fluorouracil (ADRUCIL) ambulatory infusion Soln, 500 mg/m2/day = 4,780 mg (50 % of original dose 1,000 mg/m2/day), Intravenous, Over 96 hours, 1 of 1 cycle, Start date: 1/18/2022, End date: 1/23/2022  Dose modification: 500 mg/m2/day (original dose 1,000 mg/m2/day, Cycle 1, Reason: Dose modified as per discussion with consulting physician)       He had been getting BCG vaccine for his bladder since 1994  SUBJECTIVE  (INTERVAL HISTORY)      Clotting History None   Bleeding History No major events   Cancer History Bladder   Family Cancer History None   H/O Blood/Plt Transfusion None   Tobacco/etoh/drug abuse 1 5 PPD x 17 years (quit 1970), no other abuse   Hx COVID19 Infection and Vaccine Status Pfizer x 3 (had booster 9/2021)   Cancer Screening history n/a   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)   New medications in the last month: PO iron daily (recommended MWF)  Pain: dysuria (since 10/2021 bladder procedure), LBP, L foot pain s/p remote toe amputation     His maximum weight prior to the gastric bypass that he had was 330lbs      I have reviewed the relevant past medical, surgical, social and family history  I have also reviewed allergies and medications for this patient  Interval events:  He is having mild decrease in dysuria since being started on antibiotics by radiation oncology for a UTI  He is on day 4 Abx  He has been having significant increase in urinary frequency since RT started with q10-15 minute urination and requiring him to use a diaper  Otherwise no other acute issues  Review of Systems  Denies unintentional weight loss, F/C, N/V, CP, diarrhea, constipation, rash, itching, melena, hematuria, hematochezia  Chronic mild SOB with exertion, intermittent anxiety and depression that has been worsening since his cancer diagnosis  He has had fatigue since 2/2021 (his discharge from the hospital for LLE toe amputation)  No new issues although he has been dealing with his chronic back pain over the past year  A 10-point review of system was performed, pertinent positive and negative were detailed as above  Otherwise, the 10-point review of system was negative        Past Medical History:   Diagnosis Date    Anemia     Last assessed: 9/28/17    Anxiety     Arteriosclerotic cardiovascular disease     Last assessed: 9/28/17    Arthritis     Bladder cancer ([facility] [address] )     bladder- had BCG treatments    Chronic kidney disease     Stage IV    CKD (chronic kidney disease) stage 4, GFR 15-29 ml/min ([facility])     Colon polyp     Coronary artery disease     7 stents    Depression     Diabetes mellitus ([facility])     IDDM    GERD (gastroesophageal reflux disease)     Glaucoma     Hematuria     History of fusion of cervical spine     Hyperlipidemia     Hypertension     Insomnia     Last assessed: 11/14/12    Loss of hearing     has hearing aids but usually does not wear them    Other seasonal allergic rhinitis     Last assessed: 2/10/16    PAD (peripheral artery disease) ([facility])     Shortness of breath     on exertion    Spinal stenosis of lumbar region  Transient cerebral ischemia     No Residual    Uses walker     w/c for longer distances       Past Surgical History:   Procedure Laterality Date    CARDIAC SURGERY      Cath stent placement  Last assessed: 3/9/17  Interventional Catheterization    CHOLECYSTECTOMY      COLONOSCOPY      CYSTOSCOPY      Diagnostic w/biopsy  aTna Walters  Last assessed: 12/1/14    CYSTOSCOPY W/ URETERAL STENT PLACEMENT Bilateral 10/18/2021    Procedure: bilateral retrogrades, cytology collection;  Surgeon: Mabelene Kocher, MD;  [facility];  Service: Urology    CYSTOURETHROSCOPY      w/cautery  Tana Walters    FL RETROGRADE PYELOGRAM  10/18/2021    FL RETROGRADE PYELOGRAM  10/24/2021    GASTRIC BYPASS      For morbid obesity w/Shaji-en-Y   Resolved: 11/17/09    INCISION AND DRAINAGE OF WOUND Right 2/26/2017    Procedure: INCISION AND DRAINAGE (I&D) EXTREMITY WITH APPLICATION OF GRAFT JACKET;  Surgeon: Oralia Majano DPM;  [facility];  Service:     INCISION AND DRAINAGE OF WOUND Right 4/25/2017    Procedure: INCISION AND DRAINAGE (I&D) EXTREMITY, APPLICATION OF GRAFT;  Surgeon: Oralia Majano DPM;  [facility];  Service:     IR BIOPSY OTHER  7/2/2020    IR LOWER EXTREMITY ANGIOGRAM  2/8/2021    IR LOWER EXTREMITY ANGIOGRAM  2/11/2021    IR PORT PLACEMENT  1/17/2022    IR TUNNELED CENTRAL LINE PLACEMENT  12/24/2020    JOINT REPLACEMENT      christofer knees replaced    VA AMPUTATION METATARSAL+TOE,SINGLE Left 12/21/2020    Procedure: RAY RESECTION FOOT;  Surgeon: Sujatha Shea DPM;  [facility];  Service: Podiatry    VA AMPUTATION METATARSAL+TOE,SINGLE Left 12/31/2020    Procedure: 5TH MET RESECTION;  Surgeon: Sujatha Shea DPM;  [facility];  Service: Podiatry    VA CYSTOURETHROSCOPY W/IRRIG & EVAC CLOTS N/A 2/10/2021    Procedure: CYSTOSCOPY EVACUATION OF CLOTS, fulguration;  Surgeon: Severa Chol, MD;  [facility];  Service: Urology    VA CYSTOURETHROSCOPY W/IRRIG & EVAC CLOTS N/A 10/24/2021    Procedure: CYSTOSCOPY EVACUATION OF CLOT, fulguration of bleeding vessels, right ureter stent placement, retrograde pyelogram;  Surgeon: Abilio Cardoza MD;  [facility];  Service: Urology    TN CYSTOURETHROSCOPY,BIOPSY N/A 8/16/2016    Procedure: CYSTOSCOPY WITH BIOPSIES;  Surgeon: Liberty Varner MD;  [facility];  Service: Urology    TN CYSTOURETHROSCOPY,FULGUR <0 5 CM LESN N/A 11/19/2020    Procedure: CYSTO W/BIOPSIES, transurethral prostate bx;  Surgeon: Kylee Vegas MD;  [facility];  Service: Urology    TN CYSTOURETHROSCOPY,FULGUR >5 CM LESN Bilateral 10/18/2021    Procedure: TRANSURETHRAL RESECTION OF BLADDER TUMOR (TURBT);   Surgeon: Carolina Bradley MD;  [facility];  Service: Urology    TN Malu Sury 3RD+ ORD [address] PEL/LXTR [facility] Left 2/8/2021    Procedure: LEG angiogram, CO2 w/limited contrast with balloon angioplasty postertior tibial artery;  Surgeon: Rommel De La Torre MD;  [facility];  Service: Vascular    ROTATOR CUFF REPAIR      SMALL INTESTINE SURGERY      Surgery Shaji-en-Y    SPINAL FUSION      lumbar and cervical fusions    VAC DRESSING APPLICATION Right 6/34/4290    Procedure: APPLICATION VAC DRESSING;  Surgeon: Ray Almanzar DPM;  [facility];  Service:    [address] [place] [address] Left 2/16/2021    Procedure: FOOT DEBRIDE, [address] OUT w/graft application;  Surgeon: Ray Almanzar DPM;  [facility];  Service: Podiatry       Family History   Problem Relation Age of Onset    Diabetes Mother     Heart disease Mother     Other Mother         High blood pressure    Heart disease Father     Diabetes Sister     Other Sister         High blood pressure    Kidney disease Sister     Heart disease Brother     Other Brother         High blood pressure       Social History     Socioeconomic History    Marital status: /Civil Union     Spouse name: Not on file  Number of children: Not on file    Years of education: Not on file    Highest education level: Not on file   Occupational History    Not on file   Tobacco Use    Smoking status: Former Smoker     Packs/day: 3 00     Years: 27 00     Pack years: 81 00     Types: Cigarettes     Quit date: 1970     Years since quittin 1    Smokeless tobacco: Never Used   Vaping Use    Vaping Use: Never used   Substance and Sexual Activity    Alcohol use: Not Currently     Comment: beer / liquor    Drug use: Not Currently     Types: Marijuana     Comment: quit 2019 had medical marijuana    Sexual activity: Not Currently   Other Topics Concern    Not on file   Social History Narrative    Consumes 1 cup of coffee and 1 soda per day     Social Determinants of Health     Financial Resource Strain: Not on file   Food Insecurity: Not on file   Transportation Needs: Not on file   Physical Activity: Not on file   Stress: Not on file   Social Connections: Not on file   Intimate Partner Violence: Not on file   Housing Stability: Not on file       Allergies   Allergen Reactions    Atorvastatin Hives, Itching and Rash    Simvastatin Rash and Edema     Edema of lower legs    Statins Hives and Itching    Insulin Lispro Swelling and Edema     " Lower Legs"    Other Itching, Rash and Other (See Comments)     "EKG Patches"        Current Outpatient Medications   Medication Sig Dispense Refill    acetaminophen (TYLENOL) 325 mg tablet Take 650 mg by mouth every 6 (six) hours as needed for mild pain      ALPRAZolam (XANAX) 0 25 mg tablet At twice a day as needed for anxiety 30 tablet 0    aspirin (ECOTRIN LOW STRENGTH) 81 mg EC tablet Take 1 tablet (81 mg total) by mouth daily      Azelastine HCl 0 15 % SOLN Inhale 1 spray 2 (two) times a day 30 mL 3    Bimatoprost (LUMIGAN OP) Apply 1 drop to eye daily at bedtime       calcitriol (ROCALTROL) 0 25 mcg capsule Take 1 capsule (0 25 mcg total) by mouth daily 30 capsule 1    docusate sodium (COLACE) 100 mg capsule Take 1 capsule (100 mg total) by mouth 2 (two) times a day 10 capsule 0    DULoxetine (CYMBALTA) 20 mg capsule TAKE 1 CAPSULE(20 MG) BY MOUTH DAILY 30 capsule 1    ezetimibe (ZETIA) 10 mg tablet Take 10 mg by mouth daily in the early morning       Ferrous Sulfate Dried (Feosol) 200 (65 Fe) MG TABS Take 65 mg by mouth daily 30 tablet 0    fluocinonide (LIDEX) 0 05 % cream Apply topically 2 (two) times a day (Patient taking differently: Apply topically as needed ) 30 g 0    fluticasone (FLONASE) 50 mcg/act nasal spray 1 spray into each nostril daily (Patient taking differently: 1 spray into each nostril as needed ) 11 mL 1    furosemide (LASIX) 20 mg tablet TAKE 1 TABLET BY MOUTH AS NEEDED FOR EDEMA 90 tablet 1    GABAPENTIN PO Take 200 mg by mouth      HYDROcodone-acetaminophen (NORCO) 5-325 mg per tablet 1-2 tab every 6 hr if needed for pain 30 tablet 0    Insulin Pen Needle 31G X 8 MM MISC Inject 3 times a day 100 each 2    isosorbide mononitrate (IMDUR) 30 mg 24 hr tablet Take 1 tablet (30 mg total) by mouth daily in the early morning 30 tablet 1    Lantus SoloStar 100 units/mL injection pen 18 units qhs (Patient taking differently: Inject 18 Units under the skin daily at bedtime ) 30 mL 1    levofloxacin (LEVAQUIN) 250 mg tablet Take 1 tablet (250 mg total) by mouth daily for 3 days 3 tablet 0    lidocaine-prilocaine (EMLA) cream Apply topically as needed for mild pain 30 g 0    loperamide (IMODIUM) 2 mg capsule Take 2 mg by mouth 4 (four) times a day as needed for diarrhea      multivitamin (THERAGRAN) TABS Take 1 tablet by mouth daily   naloxone (NARCAN) 4 mg/0 1 mL nasal spray Administer 1 spray into a nostril  If no response after 2-3 minutes, give another dose in the other nostril using a new spray   1 each 1    NovoLOG FlexPen 100 units/mL injection pen 10 units with each meal  (Patient taking differently: Inject 10 Units under the skin 3 (three) times a day with meals ) 5 pen 1    omeprazole (PriLOSEC) 20 mg delayed release capsule Take 20 mg by mouth daily in the early morning        metoprolol succinate (TOPROL-XL) 100 mg 24 hr tablet Take 1 tablet (100 mg total) by mouth daily for 7 days 90 tablet 0    sertraline (ZOLOFT) 50 mg tablet Take 1 tablet (50 mg total) by mouth daily (Patient taking differently: Take 50 mg by mouth daily in the early morning ) 30 tablet 3    tamsulosin (FLOMAX) 0 4 mg Take 1 capsule (0 4 mg total) by mouth daily at bedtime (Patient taking differently: Take 0 4 mg by mouth daily as needed ) 30 capsule 0     No current facility-administered medications for this visit  (Not in a hospital admission)      Objective:     24 Hour Vitals Assessment:     Vitals:    01/31/22 0915   BP: 164/92   Pulse: 69   Resp: 18   Temp: (!) 96 8 °F (36 °C)   SpO2: 99%       PHYSICIAN EXAM:    General: Appearance: alert, cooperative, no distress  HEENT: Normocephalic, atraumatic  No scleral icterus  conjunctivae clear  EOMI  Chest: No tenderness to palpation  No open wound noted  Lungs: Clear to auscultation bilaterally, Respirations unlabored  Cardiac: Regular rate and rhythm, +S1and S2, -r/m/g  Abdomen: Soft, non-tender, non-distended  Bowel sounds are normal    Extremities:  No edema, cyanosis, clubbing  Skin: Skin color, turgor are normal  No rashes  Lymphatics: no palpable supra-cervical, axillary, or inguinal adenopathy  Neurologic: Awake, Alert, and oriented, no gross focal deficits noted b/l  Port c/d/i      DATA REVIEW:    Pathology Result:    Final Diagnosis   Date Value Ref Range Status   10/18/2021   Final    A  Left posterior wall, bladder (transurethral resection):     - Urothelial tissue with small atypical cauterized focus favored to represent superficially invasive high-grade urothelial carcinoma  - Muscularis propria (detrusor muscle) present, and negative for carcinoma        - Marked edema and mucosal denudation with thermal artifact noted  B  Anterior wall, bladder (transurethral resection):      - Invasive high-grade urothelial carcinoma  - Carcinoma invades muscularis propria (detrusor muscle)  C   Left lateral wall, bladder (transurethral resection):     - Urothelial tissue with small atypical focus with marked thermal artifact; cannot exclude superficial carcinoma  - Muscularis propria (detrusor muscle) present, and negative for carcinoma  - Marked edema and mucosal denudation with thermal artifact noted  Comment: This is an appended report  These results have been appended to a previously preliminary verified report  10/18/2021   Final    A  Renal Washing, RIGHT RENAL PELVIS WASHING:  Suspicious for high grade urothelial carcinoma [facility]) - see comment  Comment:  The above diagnostic category is from the recently published book, [facility] [place] [facility] Urinary Cytology, and is in keeping with the ongoing effort for utilization of standardized diagnostic terminology in urine cytology  *    *[employer] [place] [facility] Urinary Cytology  Renetta Berrios; 2016  B  Renal Washing, LEFT RENAL PELVIS WASHING:  Atypical urothelial cells (AUC) - see comment  Comment:  The above diagnostic category is from the recently published book, [facility] [place] [facility] Urinary Cytology, and is in keeping with the ongoing effort for utilization of standardized diagnostic terminology in urine cytology  *    *[employer] [place] [facility] Urinary Cytology  Renetta Berrios; 2016 09/03/2021   Final    A  Urine, Voided, :  Atypical urothelial cells (AUC) - see comment  Red blood cells     Satisfactory for evaluation  Comment:    - Few atypical urothelial cells noted in this patient with a 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    A high grade urothelial carcinoma cannot be excluded on this material  Suggest clinical correlation and follow-up as indicated  - The above diagnostic category is from the recently published book, [facility] [place] [facility] Urinary Cytology, and is in keeping with the ongoing effort for utilization of standardized diagnostic terminology in urine cytology  *    *[facility] [place] [facility]  Renetta Berrios; 2016 '          12/31/2020   Final    A  Left 5th metatarsal bone:  - Acute osteomyelitis in benign metatarsal bone  12/21/2020   Final    A  Bone, left 5th metatarsal, amputation:       - Acute osteomyelitis  - No dysplasia or malignancy is identified  B  Bone, 5th metatarsal clean margin, excision:       - Focal acute osteomyelitis  - Large caliber blood vessels with luminal calcifications and greater than 75% occlusion        - No dysplasia or malignancy is identified  Interpretation performed at [facility] [id number] [facility] [id number]       11/19/2020   Final    A  Urinary Bladder, Left Posterior Bladder Wall:  -Extensively- denuded urothelial lined mucosa with mild chronic inflammation, vascular congestion  And edema   -Unremarkable small fragment of detrusor muscle present  -No evidence of invasive urothelial carcinoma seen    B  Urinary Bladder, Left lateral bladder Wall:  -Partially-denuded urothelial lined mucosa with mild  chronic inflammation  -Unremarkable small fragment of detrusor muscle present  -No evidence of invasive urothelial carcinoma seen    C  Urinary Bladder, Right Posterior Bladder wall:  -Urothelial lined mucosa with mild  chronic inflammation Von Brunn nests and mild urothelial atypia, possibly due to previous BCG administration   -Unremarkable small fragment of detrusor muscle present  -No evidence of invasive urothelial carcinoma seen    D   Urinary Bladder, Right Lateral Bladder Wall:  - Urothelial lined mucosa with mild  chronic inflammation   -Muscularis propria/ detrusor muscle is not present for evaluation    -No evidence of invasive urothelial carcinoma seen  E  Prostate, Prostate Tisssue:  -Urothelial lined mucosa with mild chronic inflammation, prominent Von Brunn nests and Cystitis cystica   -Unremarkable small fragment of detrusor muscle present   -No evidence of malignancy seen  08/17/2020   Final    A  Urine, Other, :  Negative for high grade urothelial carcinoma ([address]) - see comment  Urothelial cells, squamous cells, red blood cells and neutrophils  Satisfactory for evaluation  Comment:  The above diagnostic category is from the recently published book, [facility] [place] [facility] Urinary Cytology, and is in keeping with the ongoing effort for utilization of standardized diagnostic terminology in urine cytology  *    *[facility] [place] [facility] Urinary Cytology  Renetta Cuba; 2016             08/16/2016   Final    A   Bladder, biopsy:  -  High-grade urothelial carcinoma with flat and focal papillary architecture (see note)  07/18/2016   Final    A  Urine, clean catch (ThinPrep): Atypical urothelial cells (AUC) - see comment  Atypical single urothelial cells, benign squamous cells, red blood cells and neutrophils  Background of degenerative changes noted  Satisfactory for evaluation  Comment:  The above diagnostic category is from the recently published book, [facility] [place] [facility] Urinary Cytology, and is in keeping with the ongoing effort for utilization of standardized diagnostic terminology in urine cytology  *    *[facility] [place] [facility] Urinary Cytology  Renetta Cuba; 2016 01/18/2016   Final    A  Urine, Unspecified Source, :  Rare cluster of degenerated appearing urothelial cells present; see note    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  Few red blood cells also identified  Satisfactory for evaluation  Image Results:   Image result are reviewed and documented in Hematology/Oncology history    IR port placement  Narrative: PROCEDURE:  1  Ultrasound guided access of the right internal jugular vein  2  Chest port placement    FLUOROSCOPY TIME:   0 3 minutes    RADIATION DOSE:  5 mGy     NUMBER OF IMAGES:  Multiple    COMPLICATIONS:   None  ANESTHESIA/ANALGESIA:  Conscious sedation  Moderate sedation was monitored by radiology nursing staff  Monitoring of conscious sedation time totaled 30 minutes  INDICATION:   C67 3: Malignant neoplasm of anterior wall of bladder    PROCEDURE:   Risks, benefits and alternatives were explained to the patient  Questions were answered  Written consent was documented  The patient was brought to the interventional radiology suite and identified verbally and by wristband  The patient was placed supine   on the table, and right neck and upper chest was prepped and draped in the usual sterile fashion  All elements of maximal sterile barrier technique were followed (cap, mask, sterile gown, sterile gloves, large sterile sheet, hand hygiene, and 2%   chlorhexidine for cutaneous antisepsis)  Using real-time ultrasound guidance, the right internal jugular vein was accessed and a peel-away sheath was placed  Ultrasound images were saved  A subcutaneous pocket was then created using blunt dissection  A PowerPort was flushed with normal   saline  The port was placed within the pocket and the catheter was then advanced under fluoroscopic guidance through the peel-away sheath to the right atrium  The catheter was attached firmly to the port  The port was accessed and aspirated freely  Saline injection was then performed and there was no leakage  The sponge count was reconciled  The port was sutured in the pocket using 2 3-0 Vicryl sutures  The incision was closed using 3-0 Vicryl interrupted suture followed by 4-0 Monocryl   subarticular suture and skin glue  The port may be used immediately  FINDINGS:   1   Real-time ultrasound showed an anechoic and freely compressible right internal jugular vein  2   Final fluoroscopic image shows the chest port to be in good position  Impression: Chest port placement  Workstation performed: [id number]      LABS:  Lab data are reviewed and documented in HemOnc history  Lab Results   Component Value Date    HGB 10 9 (L) 01/25/2022    HCT 32 1 (L) 01/25/2022    MCV 91 01/25/2022     01/25/2022    WBC 8 33 01/25/2022    NRBC 0 01/25/2022    BANDSPCT 2 12/30/2020    ATYLMPCT 2 (H) 12/30/2020     Lab Results   Component Value Date     09/03/2015    K 4 9 01/25/2022     01/25/2022    CO2 26 01/25/2022    ANIONGAP 5 09/03/2015    BUN 51 (H) 01/25/2022    CREATININE 3 57 (H) 01/25/2022    GLUCOSE 203 (H) 09/03/2015    GLUF 202 (H) 01/25/2022    CALCIUM 8 7 01/25/2022    CORRECTEDCA 9 4 01/20/2022    AST 44 01/25/2022    ALT 99 (H) 01/25/2022    ALKPHOS 163 (H) 01/25/2022    PROT 6 4 07/26/2015    BILITOT 0 50 07/26/2015    EGFR 15 01/25/2022       Lab Results   Component Value Date    IRON 186 (H) 12/10/2021    TIBC 377 12/10/2021    FERRITIN 23 12/10/2021    FERRITIN 40 10/13/2021    FERRITIN 31 06/22/2021    FERRITIN 23 02/17/2021    FERRITIN 52 12/19/2020    FERRITIN 73 09/19/2018       No results found for: [id number]    No results for input(s): WBC, CREAT in the last 72 hours      Invalid input(s):  PLT    By:  Shira Gallego MD, 1/31/2022, 9:47 AM

## 2022-01-27 ENCOUNTER — HOSPITAL ENCOUNTER (OUTPATIENT)
Dept: INFUSION CENTER | Facility: HOSPITAL | Age: 77
Discharge: HOME | End: 2022-01-27
Payer: MEDICARE

## 2022-01-27 ENCOUNTER — APPOINTMENT (OUTPATIENT)
Dept: RADIATION ONCOLOGY | Facility: HOSPITAL | Age: 77
End: 2022-01-27
Attending: INTERNAL MEDICINE
Payer: MEDICARE

## 2022-01-27 VITALS — TEMPERATURE: 98.8 F

## 2022-01-27 DIAGNOSIS — N39.0 RECURRENT UTI: Primary | ICD-10-CM

## 2022-01-27 DIAGNOSIS — Z95.828 PORT-A-CATH IN PLACE: ICD-10-CM

## 2022-01-27 DIAGNOSIS — C67.3 MALIGNANT NEOPLASM OF ANTERIOR WALL OF URINARY BLADDER (HCC): Primary | ICD-10-CM

## 2022-01-27 LAB — BACTERIA UR CULT: ABNORMAL

## 2022-01-27 NOTE — PROGRESS NOTES
Received a call from Dr Alex Christianson at [facility] patient's chemo bag had started leaking in her office  She put his bag in an [facility] bag and he is currently on his way to the infusion center  Instructed Dr Alex Christianson how to clean spilled chemo  Patient arrived and actual bag of chemo appears to have a hole in it on top corner  Bag is currently empty and reservoir volume states bag should have 46 3 mls left  Pump stopped and Dr Paolo Blanco office contacted  Spoke to Travis at [facility] infusion, they would need to make a new full volume bag that would not automatically stop on Saturday  With the possibility of patient receiving too much chemo if he is unable to come to infusion on Saturday for disconnect, patient will be disconnected today  Patient states he had chemo on his pants and belt  Patient given disposable scrubs  Patient instructed to go straight home and shower with soap and water  Pants bagged, patient aware to wash twice in hot water and chemo gloves were given for patient to handle clothes  Port intact, flushed with good blood return and deaccessed  Patient aware he does not need to return Saturday and is aware of next appointment  Travis from [facility] states a bigger CADD pump pouch will be sent with CADD for next cycle  Today's CADD double bagged with chemo bags and [facility] back [facility], [facility] and as long as it is not leaking this is ok

## 2022-01-28 ENCOUNTER — APPOINTMENT (OUTPATIENT)
Dept: RADIATION ONCOLOGY | Facility: HOSPITAL | Age: 77
End: 2022-01-28
Attending: INTERNAL MEDICINE
Payer: MEDICARE

## 2022-01-28 ENCOUNTER — TELEPHONE (OUTPATIENT)
Dept: RADIATION ONCOLOGY | Facility: CLINIC | Age: 77
End: 2022-01-28

## 2022-01-28 DIAGNOSIS — N39.0 RECURRENT UTI: Primary | ICD-10-CM

## 2022-01-28 RX ORDER — LEVOFLOXACIN 250 MG/1
250 TABLET ORAL DAILY
Qty: 3 TABLET | Refills: 0 | Status: SHIPPED | OUTPATIENT
Start: 2022-01-28 | End: 2022-01-31

## 2022-01-28 NOTE — PROGRESS NOTES
Patient complained of recurrent UTI symptoms  UA/UC showed >100k enterococcus, sensative to levaquin  Ordered Levaquin 250mg Daily for 3 days

## 2022-01-28 NOTE — TELEPHONE ENCOUNTER
LM that Dr Negar Sanders ordered Levaquin for his UTI  Instructed to take one tablet daily for 3 days  Instructed to call back if he has any questions,office phone number provided

## 2022-01-29 ENCOUNTER — HOSPITAL ENCOUNTER (OUTPATIENT)
Dept: INFUSION CENTER | Facility: HOSPITAL | Age: 77
End: 2022-01-29
Attending: INTERNAL MEDICINE

## 2022-01-31 ENCOUNTER — APPOINTMENT (OUTPATIENT)
Dept: RADIATION ONCOLOGY | Facility: HOSPITAL | Age: 77
End: 2022-01-31
Attending: INTERNAL MEDICINE
Payer: MEDICARE

## 2022-01-31 ENCOUNTER — TELEPHONE (OUTPATIENT)
Dept: HEMATOLOGY ONCOLOGY | Facility: CLINIC | Age: 77
End: 2022-01-31

## 2022-01-31 ENCOUNTER — OFFICE VISIT (OUTPATIENT)
Dept: HEMATOLOGY ONCOLOGY | Facility: CLINIC | Age: 77
End: 2022-01-31
Payer: MEDICARE

## 2022-01-31 VITALS
HEART RATE: 69 BPM | TEMPERATURE: 96.8 F | DIASTOLIC BLOOD PRESSURE: 92 MMHG | BODY MASS INDEX: 31.01 KG/M2 | SYSTOLIC BLOOD PRESSURE: 164 MMHG | WEIGHT: 234 LBS | RESPIRATION RATE: 18 BRPM | OXYGEN SATURATION: 99 % | HEIGHT: 73 IN

## 2022-01-31 DIAGNOSIS — C67.3 MALIGNANT NEOPLASM OF ANTERIOR WALL OF URINARY BLADDER (HCC): Primary | ICD-10-CM

## 2022-01-31 PROCEDURE — 99214 OFFICE O/P EST MOD 30 MIN: CPT | Performed by: INTERNAL MEDICINE

## 2022-01-31 NOTE — TELEPHONE ENCOUNTER
Spoke to patient gave him  His f/u appt With Dr Perla Lr  And ct appt  He was find with  Both appts

## 2022-01-31 NOTE — Clinical Note
Demario Acosta,  I have scheduled the 2 month post treatment CT CAP without contrast for Kristofer for the end of April  He seems to be doing a bit better after his Abx were started but still having mild hematuria  I asked him to notify Dr Carlos Rattler office if this worsens (I was thinking this was likely RT induced since it started with RT)

## 2022-02-01 ENCOUNTER — APPOINTMENT (OUTPATIENT)
Dept: RADIATION ONCOLOGY | Facility: HOSPITAL | Age: 77
End: 2022-02-01
Attending: INTERNAL MEDICINE
Payer: MEDICARE

## 2022-02-01 PROCEDURE — 77014 CHG CT GUIDANCE PLACEMENT RAD THERAPY FIELDS: CPT | Performed by: STUDENT IN AN ORGANIZED HEALTH CARE EDUCATION/TRAINING PROGRAM

## 2022-02-01 PROCEDURE — 77427 RADIATION TX MANAGEMENT X5: CPT | Performed by: INTERNAL MEDICINE

## 2022-02-01 PROCEDURE — 77386 HB NTSTY MODUL RAD TX DLVR CPLX: CPT | Performed by: STUDENT IN AN ORGANIZED HEALTH CARE EDUCATION/TRAINING PROGRAM

## 2022-02-02 ENCOUNTER — APPOINTMENT (OUTPATIENT)
Dept: RADIATION ONCOLOGY | Facility: HOSPITAL | Age: 77
End: 2022-02-02
Attending: INTERNAL MEDICINE
Payer: MEDICARE

## 2022-02-02 DIAGNOSIS — D50.9 IRON DEFICIENCY ANEMIA, UNSPECIFIED IRON DEFICIENCY ANEMIA TYPE: ICD-10-CM

## 2022-02-02 DIAGNOSIS — M54.16 LUMBAR RADICULOPATHY: ICD-10-CM

## 2022-02-02 DIAGNOSIS — F32.9 REACTIVE DEPRESSION: ICD-10-CM

## 2022-02-02 DIAGNOSIS — E78.2 MIXED HYPERLIPIDEMIA: ICD-10-CM

## 2022-02-02 DIAGNOSIS — N18.4 CHRONIC KIDNEY DISEASE, STAGE 4 (SEVERE) (HCC): ICD-10-CM

## 2022-02-02 DIAGNOSIS — M47.816 LUMBAR SPONDYLOSIS: ICD-10-CM

## 2022-02-02 DIAGNOSIS — M51.36 DEGENERATION OF LUMBAR INTERVERTEBRAL DISC: ICD-10-CM

## 2022-02-02 DIAGNOSIS — E55.9 VITAMIN D DEFICIENCY: ICD-10-CM

## 2022-02-02 DIAGNOSIS — I25.118 CORONARY ARTERY DISEASE OF NATIVE ARTERY OF NATIVE HEART WITH STABLE ANGINA PECTORIS (HCC): ICD-10-CM

## 2022-02-02 DIAGNOSIS — R30.0 DYSURIA: ICD-10-CM

## 2022-02-02 DIAGNOSIS — I70.209 ARTERIOSCLEROSIS OF ARTERY OF EXTREMITY (HCC): Primary | ICD-10-CM

## 2022-02-02 DIAGNOSIS — N18.30 STAGE 3 CHRONIC KIDNEY DISEASE, UNSPECIFIED WHETHER STAGE 3A OR 3B CKD (HCC): ICD-10-CM

## 2022-02-02 PROCEDURE — 77386 HB NTSTY MODUL RAD TX DLVR CPLX: CPT | Performed by: RADIOLOGY

## 2022-02-02 PROCEDURE — 77014 CHG CT GUIDANCE PLACEMENT RAD THERAPY FIELDS: CPT | Performed by: RADIOLOGY

## 2022-02-02 RX ORDER — ISOSORBIDE MONONITRATE 30 MG/1
30 TABLET, EXTENDED RELEASE ORAL
Qty: 90 TABLET | Refills: 0 | Status: SHIPPED | OUTPATIENT
Start: 2022-02-02 | End: 2022-05-10 | Stop reason: SDUPTHER

## 2022-02-02 RX ORDER — FERROUS SULFATE 325(65) MG
65 TABLET ORAL DAILY
Qty: 90 TABLET | Refills: 0 | Status: SHIPPED | OUTPATIENT
Start: 2022-02-02

## 2022-02-02 RX ORDER — EZETIMIBE 10 MG/1
10 TABLET ORAL
Qty: 90 TABLET | Refills: 0 | Status: SHIPPED | OUTPATIENT
Start: 2022-02-02 | End: 2022-05-10 | Stop reason: SDUPTHER

## 2022-02-02 RX ORDER — DOCUSATE SODIUM 100 MG/1
100 CAPSULE, LIQUID FILLED ORAL 2 TIMES DAILY PRN
Qty: 100 CAPSULE | Refills: 0 | Status: SHIPPED | OUTPATIENT
Start: 2022-02-02 | End: 2022-03-25

## 2022-02-02 RX ORDER — DULOXETIN HYDROCHLORIDE 20 MG/1
20 CAPSULE, DELAYED RELEASE ORAL DAILY
Qty: 90 CAPSULE | Refills: 0 | Status: ON HOLD | OUTPATIENT
Start: 2022-02-02 | End: 2022-05-05 | Stop reason: SDUPTHER

## 2022-02-02 RX ORDER — CALCITRIOL 0.25 UG/1
0.25 CAPSULE, LIQUID FILLED ORAL DAILY
Qty: 90 CAPSULE | Refills: 0 | Status: SHIPPED | OUTPATIENT
Start: 2022-02-02

## 2022-02-02 RX ORDER — TAMSULOSIN HYDROCHLORIDE 0.4 MG/1
0.4 CAPSULE ORAL
Qty: 90 CAPSULE | Refills: 0 | Status: SHIPPED | OUTPATIENT
Start: 2022-02-02 | End: 2022-05-04

## 2022-02-03 ENCOUNTER — TELEPHONE (OUTPATIENT)
Dept: HEMATOLOGY ONCOLOGY | Facility: CLINIC | Age: 77
End: 2022-02-03

## 2022-02-03 ENCOUNTER — APPOINTMENT (OUTPATIENT)
Dept: RADIATION ONCOLOGY | Facility: HOSPITAL | Age: 77
End: 2022-02-03
Attending: INTERNAL MEDICINE
Payer: MEDICARE

## 2022-02-03 ENCOUNTER — TELEPHONE (OUTPATIENT)
Dept: RADIATION ONCOLOGY | Facility: HOSPITAL | Age: 77
End: 2022-02-03

## 2022-02-03 DIAGNOSIS — N39.0 RECURRENT UTI: Primary | ICD-10-CM

## 2022-02-03 PROCEDURE — 77014 CHG CT GUIDANCE PLACEMENT RAD THERAPY FIELDS: CPT | Performed by: RADIOLOGY

## 2022-02-03 PROCEDURE — 77386 HB NTSTY MODUL RAD TX DLVR CPLX: CPT | Performed by: RADIOLOGY

## 2022-02-03 RX ORDER — AMOXICILLIN 500 MG/1
500 CAPSULE ORAL EVERY 24 HOURS
Qty: 5 CAPSULE | Refills: 0 | Status: SHIPPED | OUTPATIENT
Start: 2022-02-03 | End: 2022-02-08

## 2022-02-03 NOTE — TELEPHONE ENCOUNTER
Received a call from Kristofer stating he recently had a UTI and was given antibiotics  Looks like Dr Rod Bowman was the one who prescribed something for the UTI  Kristofer is stating he feels like the antibiotics didn't help much and that he still has the UTI

## 2022-02-03 NOTE — TELEPHONE ENCOUNTER
Mr Francesca Neal is undergoing chemoradiation for management of bladder cancer  He has a history of recurrent urinary tract infections and has been having intermittent hematuria throughout his treatment course  He was having worsening dysuria and hematuria last week prompting urine culture, which demonstrated > 100,000 cfu/ml  E  Faecalis  He was prescribed and completed a 3 day course of levofloxacin  He has persistent symptoms of dysuria and hematuria, unchanged since receiving levofloxacin  He denies worsening fevers, chills, flank pain  He is able to empty his bladder and denies retention  I discussed his case with the infectious disease pharmacist and have prescribed 500 mg amoxicillin for 5 days (taking into account his renal function)  Nursing staff will notify Dr Patrick Renner upon his return  Additionally, he was instructed to report for further evaluation if he develops worsening dysuria, hematuria, fevers/chills, pain, flank discomfort or urinary retention  He voiced understanding

## 2022-02-04 ENCOUNTER — APPOINTMENT (OUTPATIENT)
Dept: LAB | Facility: CLINIC | Age: 77
End: 2022-02-04
Payer: MEDICARE

## 2022-02-04 ENCOUNTER — APPOINTMENT (OUTPATIENT)
Dept: RADIATION ONCOLOGY | Facility: HOSPITAL | Age: 77
End: 2022-02-04
Attending: INTERNAL MEDICINE
Payer: MEDICARE

## 2022-02-04 DIAGNOSIS — C67.3 MALIGNANT NEOPLASM OF ANTERIOR WALL OF URINARY BLADDER (HCC): ICD-10-CM

## 2022-02-04 LAB
ALBUMIN SERPL BCP-MCNC: 3.4 G/DL (ref 3.5–5)
ALP SERPL-CCNC: 134 U/L (ref 46–116)
ALT SERPL W P-5'-P-CCNC: 73 U/L (ref 12–78)
ANION GAP SERPL CALCULATED.3IONS-SCNC: 4 MMOL/L (ref 4–13)
ARTIFACT: PRESENT
AST SERPL W P-5'-P-CCNC: 33 U/L (ref 5–45)
BASOPHILS # BLD MANUAL: 0.04 THOUSAND/UL (ref 0–0.1)
BASOPHILS NFR MAR MANUAL: 1 % (ref 0–1)
BILIRUB SERPL-MCNC: 1.06 MG/DL (ref 0.2–1)
BUN SERPL-MCNC: 48 MG/DL (ref 5–25)
CALCIUM ALBUM COR SERPL-MCNC: 9.5 MG/DL (ref 8.3–10.1)
CALCIUM SERPL-MCNC: 9 MG/DL (ref 8.3–10.1)
CHLORIDE SERPL-SCNC: 108 MMOL/L (ref 100–108)
CO2 SERPL-SCNC: 25 MMOL/L (ref 21–32)
CREAT SERPL-MCNC: 3.14 MG/DL (ref 0.6–1.3)
EOSINOPHIL # BLD MANUAL: 0.33 THOUSAND/UL (ref 0–0.4)
EOSINOPHIL NFR BLD MANUAL: 9 % (ref 0–6)
ERYTHROCYTE [DISTWIDTH] IN BLOOD BY AUTOMATED COUNT: 13.5 % (ref 11.6–15.1)
GFR SERPL CREATININE-BSD FRML MDRD: 17 ML/MIN/1.73SQ M
GLUCOSE P FAST SERPL-MCNC: 187 MG/DL (ref 65–99)
HCT VFR BLD AUTO: 31.4 % (ref 36.5–49.3)
HGB BLD-MCNC: 10.3 G/DL (ref 12–17)
LYMPHOCYTES # BLD AUTO: 0.73 THOUSAND/UL (ref 0.6–4.47)
LYMPHOCYTES # BLD AUTO: 20 % (ref 14–44)
MCH RBC QN AUTO: 30.2 PG (ref 26.8–34.3)
MCHC RBC AUTO-ENTMCNC: 32.8 G/DL (ref 31.4–37.4)
MCV RBC AUTO: 92 FL (ref 82–98)
MONOCYTES # BLD AUTO: 0.07 THOUSAND/UL (ref 0–1.22)
MONOCYTES NFR BLD: 2 % (ref 4–12)
NEUTROPHILS # BLD MANUAL: 2.44 THOUSAND/UL (ref 1.85–7.62)
NEUTS BAND NFR BLD MANUAL: 3 % (ref 0–8)
NEUTS SEG NFR BLD AUTO: 64 % (ref 43–75)
PLATELET # BLD AUTO: 147 THOUSANDS/UL (ref 149–390)
PLATELET BLD QL SMEAR: ADEQUATE
PMV BLD AUTO: 11.1 FL (ref 8.9–12.7)
POTASSIUM SERPL-SCNC: 4.8 MMOL/L (ref 3.5–5.3)
PROT SERPL-MCNC: 7.2 G/DL (ref 6.4–8.2)
RBC # BLD AUTO: 3.41 MILLION/UL (ref 3.88–5.62)
SODIUM SERPL-SCNC: 137 MMOL/L (ref 136–145)
VARIANT LYMPHS # BLD AUTO: 1 %
WBC # BLD AUTO: 3.64 THOUSAND/UL (ref 4.31–10.16)

## 2022-02-04 PROCEDURE — 85027 COMPLETE CBC AUTOMATED: CPT

## 2022-02-04 PROCEDURE — 80053 COMPREHEN METABOLIC PANEL: CPT

## 2022-02-04 PROCEDURE — 85007 BL SMEAR W/DIFF WBC COUNT: CPT

## 2022-02-04 PROCEDURE — 77014 CHG CT GUIDANCE PLACEMENT RAD THERAPY FIELDS: CPT | Performed by: RADIOLOGY

## 2022-02-04 PROCEDURE — 36415 COLL VENOUS BLD VENIPUNCTURE: CPT

## 2022-02-04 PROCEDURE — 77386 HB NTSTY MODUL RAD TX DLVR CPLX: CPT | Performed by: RADIOLOGY

## 2022-02-05 ENCOUNTER — NURSE TRIAGE (OUTPATIENT)
Dept: OTHER | Facility: OTHER | Age: 77
End: 2022-02-05

## 2022-02-05 DIAGNOSIS — N32.89 BLADDER SPASM: Primary | ICD-10-CM

## 2022-02-05 RX ORDER — OXYBUTYNIN CHLORIDE 5 MG/1
5 TABLET ORAL 3 TIMES DAILY
Qty: 15 TABLET | Refills: 0 | Status: SHIPPED | OUTPATIENT
Start: 2022-02-05 | End: 2022-03-02 | Stop reason: HOSPADM

## 2022-02-05 NOTE — TELEPHONE ENCOUNTER
Per on call OSWALDO Valdez, Oxybutynin 5 mg, three times a day as needed  was sent to the pharmacy  Patient was notified

## 2022-02-05 NOTE — TELEPHONE ENCOUNTER
Reason for Disposition   [1] SEVERE pain with urination  (e g , excruciating) AND [2] not improved after 2 hours of pain medicine (e g , acetaminophen or ibuprofen)    Answer Assessment - Initial Assessment Questions  1  SEVERITY: "How bad is the pain?"  (e g , Scale 1-10; mild, moderate, or severe)    - MILD (1-3): complains slightly about urination hurting    - MODERATE (4-7): interferes with normal activities      - SEVERE (8-10): excruciating, unwilling or unable to urinate because of the pain      Severe 8-9 out of 10 pain with urination  Moderate 5-6 out of 10 without urination  2  FREQUENCY: "How many times have you had painful urination today?"       Every 15-30 minutes patient has to urinate   3  PATTERN: "Is pain present every time you urinate or just sometimes?"       Comes and goes  4  ONSET: "When did the painful urination start?"        Ongoing since 12/21   5  FEVER: "Do you have a fever?" If Yes, ask: "What is your temperature, how was it measured, and when did it start?"      No   6  PAST UTI: "Have you had a urine infection before?" If Yes, ask: "When was the last time?" and "What happened that time?"       Current dx of UTI, patient  has been taking Amoxicillin   7  CAUSE: "What do you think is causing the painful urination?"       Dx of Bladder Cancer, bladder spasms   8  OTHER SYMPTOMS: "Do you have any other symptoms?" (e g , flank pain, penile discharge, scrotal pain, blood in urine)      Blood in urine      Protocols used: [facility]

## 2022-02-05 NOTE — TELEPHONE ENCOUNTER
Regarding: Painful bladder spasms   ----- Message from [facility] sent at 2/5/2022 10:08 AM EST -----  "I am having very painful bladder spasms  "

## 2022-02-07 ENCOUNTER — APPOINTMENT (OUTPATIENT)
Dept: RADIATION ONCOLOGY | Facility: HOSPITAL | Age: 77
End: 2022-02-07
Attending: INTERNAL MEDICINE
Payer: MEDICARE

## 2022-02-07 PROCEDURE — 77014 CHG CT GUIDANCE PLACEMENT RAD THERAPY FIELDS: CPT | Performed by: INTERNAL MEDICINE

## 2022-02-07 PROCEDURE — 77386 HB NTSTY MODUL RAD TX DLVR CPLX: CPT | Performed by: INTERNAL MEDICINE

## 2022-02-07 PROCEDURE — 77336 RADIATION PHYSICS CONSULT: CPT | Performed by: INTERNAL MEDICINE

## 2022-02-08 ENCOUNTER — APPOINTMENT (OUTPATIENT)
Dept: RADIATION ONCOLOGY | Facility: HOSPITAL | Age: 77
End: 2022-02-08
Attending: INTERNAL MEDICINE
Payer: MEDICARE

## 2022-02-08 PROCEDURE — 77427 RADIATION TX MANAGEMENT X5: CPT | Performed by: INTERNAL MEDICINE

## 2022-02-08 PROCEDURE — 77014 CHG CT GUIDANCE PLACEMENT RAD THERAPY FIELDS: CPT | Performed by: RADIOLOGY

## 2022-02-08 PROCEDURE — 77386 HB NTSTY MODUL RAD TX DLVR CPLX: CPT | Performed by: RADIOLOGY

## 2022-02-09 ENCOUNTER — PROCEDURE VISIT (OUTPATIENT)
Dept: UROLOGY | Facility: MEDICAL CENTER | Age: 77
End: 2022-02-09

## 2022-02-09 ENCOUNTER — APPOINTMENT (OUTPATIENT)
Dept: RADIATION ONCOLOGY | Facility: HOSPITAL | Age: 77
End: 2022-02-09
Attending: INTERNAL MEDICINE
Payer: MEDICARE

## 2022-02-09 VITALS
SYSTOLIC BLOOD PRESSURE: 140 MMHG | HEIGHT: 73 IN | HEART RATE: 74 BPM | BODY MASS INDEX: 31.3 KG/M2 | DIASTOLIC BLOOD PRESSURE: 80 MMHG | OXYGEN SATURATION: 100 %

## 2022-02-09 DIAGNOSIS — C67.3 MALIGNANT NEOPLASM OF ANTERIOR WALL OF URINARY BLADDER (HCC): Primary | ICD-10-CM

## 2022-02-09 DIAGNOSIS — N30.00 ACUTE CYSTITIS WITHOUT HEMATURIA: ICD-10-CM

## 2022-02-09 PROCEDURE — 77014 CHG CT GUIDANCE PLACEMENT RAD THERAPY FIELDS: CPT | Performed by: INTERNAL MEDICINE

## 2022-02-09 PROCEDURE — 77386 HB NTSTY MODUL RAD TX DLVR CPLX: CPT | Performed by: INTERNAL MEDICINE

## 2022-02-09 NOTE — PROGRESS NOTES
Patient was here for cystoscopy  However, became somewhat faint on his way to the room  We placed him in a wheelchair, he recovered quickly  And we  check vital signs which are normal       He says he often gets a little fatigued after his radiation treatments, which he had this morning  He is normal on examination now, but I will defer any scope examination till 4-6 weeks after he finishes the XRT    Also he  just took his last dose of antibiotic for UTI, he will turn in a new urine culture in 4-5 days

## 2022-02-10 ENCOUNTER — APPOINTMENT (OUTPATIENT)
Dept: RADIATION ONCOLOGY | Facility: HOSPITAL | Age: 77
End: 2022-02-10
Attending: INTERNAL MEDICINE
Payer: MEDICARE

## 2022-02-11 ENCOUNTER — APPOINTMENT (OUTPATIENT)
Dept: RADIATION ONCOLOGY | Facility: HOSPITAL | Age: 77
End: 2022-02-11
Attending: INTERNAL MEDICINE
Payer: MEDICARE

## 2022-02-11 PROCEDURE — 77014 CHG CT GUIDANCE PLACEMENT RAD THERAPY FIELDS: CPT | Performed by: INTERNAL MEDICINE

## 2022-02-11 PROCEDURE — 77386 HB NTSTY MODUL RAD TX DLVR CPLX: CPT | Performed by: INTERNAL MEDICINE

## 2022-02-14 ENCOUNTER — TELEPHONE (OUTPATIENT)
Dept: INTERNAL MEDICINE CLINIC | Facility: CLINIC | Age: 77
End: 2022-02-14

## 2022-02-14 ENCOUNTER — APPOINTMENT (OUTPATIENT)
Dept: RADIATION ONCOLOGY | Facility: HOSPITAL | Age: 77
End: 2022-02-14
Attending: INTERNAL MEDICINE
Payer: MEDICARE

## 2022-02-14 DIAGNOSIS — C67.3 MALIGNANT NEOPLASM OF ANTERIOR WALL OF URINARY BLADDER (HCC): Primary | ICD-10-CM

## 2022-02-14 PROCEDURE — 77386 HB NTSTY MODUL RAD TX DLVR CPLX: CPT | Performed by: INTERNAL MEDICINE

## 2022-02-14 PROCEDURE — 77014 CHG CT GUIDANCE PLACEMENT RAD THERAPY FIELDS: CPT | Performed by: INTERNAL MEDICINE
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
## 2022-02-15 ENCOUNTER — TELEMEDICINE (OUTPATIENT)
Dept: INTERNAL MEDICINE CLINIC | Facility: CLINIC | Age: 77
End: 2022-02-15
Payer: MEDICARE

## 2022-02-15 ENCOUNTER — APPOINTMENT (OUTPATIENT)
Dept: RADIATION ONCOLOGY | Facility: HOSPITAL | Age: 77
End: 2022-02-15
Attending: INTERNAL MEDICINE
Payer: MEDICARE

## 2022-02-15 DIAGNOSIS — N39.0 URINARY TRACT INFECTION WITHOUT HEMATURIA, SITE UNSPECIFIED: ICD-10-CM

## 2022-02-15 DIAGNOSIS — N18.30 TYPE 2 DIABETES MELLITUS WITH STAGE 3 CHRONIC KIDNEY DISEASE, WITH LONG-TERM CURRENT USE OF INSULIN, UNSPECIFIED WHETHER STAGE 3A OR 3B CKD (HCC): Primary | ICD-10-CM

## 2022-02-15 DIAGNOSIS — E11.22 TYPE 2 DIABETES MELLITUS WITH STAGE 3 CHRONIC KIDNEY DISEASE, WITH LONG-TERM CURRENT USE OF INSULIN, UNSPECIFIED WHETHER STAGE 3A OR 3B CKD (HCC): Primary | ICD-10-CM

## 2022-02-15 DIAGNOSIS — Z79.4 TYPE 2 DIABETES MELLITUS WITH STAGE 3 CHRONIC KIDNEY DISEASE, WITH LONG-TERM CURRENT USE OF INSULIN, UNSPECIFIED WHETHER STAGE 3A OR 3B CKD (HCC): Primary | ICD-10-CM

## 2022-02-15 DIAGNOSIS — D09.0 CARCINOMA IN SITU OF BLADDER: ICD-10-CM

## 2022-02-15 DIAGNOSIS — C67.3 MALIGNANT NEOPLASM OF ANTERIOR WALL OF URINARY BLADDER (HCC): Primary | ICD-10-CM

## 2022-02-15 DIAGNOSIS — F32.1 MODERATE MAJOR DEPRESSION, SINGLE EPISODE (HCC): ICD-10-CM

## 2022-02-15 DIAGNOSIS — I25.118 CORONARY ARTERY DISEASE OF NATIVE ARTERY OF NATIVE HEART WITH STABLE ANGINA PECTORIS (HCC): ICD-10-CM

## 2022-02-15 DIAGNOSIS — I45.9 STOKES-ADAMS SYNCOPE: ICD-10-CM

## 2022-02-15 DIAGNOSIS — I12.9 HYPERTENSION WITH RENAL DISEASE: ICD-10-CM

## 2022-02-15 PROCEDURE — 77386 HB NTSTY MODUL RAD TX DLVR CPLX: CPT | Performed by: RADIOLOGY

## 2022-02-15 PROCEDURE — 77014 CHG CT GUIDANCE PLACEMENT RAD THERAPY FIELDS: CPT | Performed by: RADIOLOGY

## 2022-02-15 PROCEDURE — 77336 RADIATION PHYSICS CONSULT: CPT | Performed by: INTERNAL MEDICINE

## 2022-02-15 PROCEDURE — 99213 OFFICE O/P EST LOW 20 MIN: CPT | Performed by: INTERNAL MEDICINE

## 2022-02-15 RX ORDER — NITROFURANTOIN MACROCRYSTALS 100 MG/1
CAPSULE ORAL
Qty: 10 CAPSULE | Refills: 0 | Status: SHIPPED | OUTPATIENT
Start: 2022-02-15 | End: 2022-02-15

## 2022-02-15 RX ORDER — NITROFURANTOIN MACROCRYSTALS 100 MG/1
CAPSULE ORAL
Qty: 10 CAPSULE | Refills: 0 | Status: CANCELLED | OUTPATIENT
Start: 2022-02-15

## 2022-02-15 NOTE — PROGRESS NOTES
Virtual Regular Visit      Patient call said he having increasing bladder pain dysuria no fever chills does not look toxic is with daily with your tract infection he was treated with amoxicillin by Urology and Levaquin by Urology without much relieve he went yesterday told to take Tylenol for pain he wanted to try another antibiotics I review the urine culture he grew enterococci that is sensitive Macrodantin in view of his renal dysfunction I am going to cut the dose in half  Will going to order  On able to order Macrodantin because is renally excreted in may not work is contraindicated in geriatric population so I called him back I told him to go to the emergency room so he can get a urine culture to the cath and give him some IV antibiotics like 1 dose of gentamicin he is going to see the nephrologist this afternoon which I think is an excellent idea      I advised him if he has severe pain or fever to go to the emergency room immediately might need IV antibiotics  He does not have any nausea vomiting or abdominal pain denied any flank tenderness    Coronary artery disease appears to be stable denies any angina    No evidence of heart failure    Appears in no distress on video      Verification of patient location:    Patient is located in the following state in which I hold an active license [place]      Assessment/Plan:    Problem List Items Addressed This Visit        Endocrine    Type 2 diabetes mellitus, with long-term current use of insulin ([address] ) - Primary       Cardiovascular and Mediastinum    Coronary artery disease of native artery of native heart with stable angina pectoris ([address] )    Hypertension with renal disease       Nervous and Auditory    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       Genitourinary    Carcinoma in situ of bladder       Other    Moderate major depression, single episode ([address] )      Other Visit Diagnoses     Urinary tract infection without hematuria, site unspecified        Relevant Medications nitrofurantoin (MACRODANTIN) 100 mg capsule               Reason for visit is   Chief Complaint   Patient presents with    Virtual Regular Visit        Encounter provider iMly Durham MD    Provider located at [facility] [id number]      Recent Visits  Date Type Provider Dept   02/14/22 Telephone Mily Durham MD  Internal Med [address] recent visits within past 7 days and meeting all other requirements  Future Appointments  No visits were found meeting these conditions  Showing future appointments within next 150 days and meeting all other requirements       The patient was identified by name and date of birth  Ed Ivette was informed that this is a telemedicine visit and that the visit is being conducted through [address] [place] [address] Now and patient was informed that this is a secure, HIPAA-compliant platform  He agrees to proceed     My office door was closed  No one else was in the room  He acknowledged consent and understanding of privacy and security of the video platform  The patient has agreed to participate and understands they can discontinue the visit at any time  Patient is aware this is a billable service  Subjective  Ed Ivette is a 64 y o  male          HPI     Past Medical History:   Diagnosis Date    Anemia     Last assessed: 9/28/17    Anxiety     Arteriosclerotic cardiovascular disease     Last assessed: 9/28/17    Arthritis     Bladder cancer ([facility] )     bladder- had BCG treatments    Chronic kidney disease     Stage IV    CKD (chronic kidney disease) stage 4, GFR 15-29 ml/min ([facility])     Colon polyp     Coronary artery disease     7 stents    Depression     Diabetes mellitus (HCC)     IDDM    GERD (gastroesophageal reflux disease)     Glaucoma     Hematuria     History of fusion of cervical spine     Hyperlipidemia     Hypertension     Insomnia     Last assessed: 11/14/12  Loss of hearing     has hearing aids but usually does not wear them    Other seasonal allergic rhinitis     Last assessed: 2/10/16    PAD (peripheral artery disease) (HCC)     Shortness of breath     on exertion    Spinal stenosis of lumbar region     Transient cerebral ischemia     No Residual    Uses walker     w/c for longer distances       Past Surgical History:   Procedure Laterality Date    CARDIAC SURGERY      Cath stent placement  Last assessed: 3/9/17  Interventional Catheterization    CHOLECYSTECTOMY      COLONOSCOPY      CYSTOSCOPY      Diagnostic w/biopsy  Jazmin Grajeda  Last assessed: 12/1/14    CYSTOSCOPY W/ URETERAL STENT PLACEMENT Bilateral 10/18/2021    Procedure: bilateral retrogrades, cytology collection;  Surgeon: India Tee MD;  [facility];  Service: Urology    CYSTOURETHROSCOPY      w/cautery  Jazmin Grajeda    FL RETROGRADE PYELOGRAM  10/18/2021    FL RETROGRADE PYELOGRAM  10/24/2021    GASTRIC BYPASS      For morbid obesity w/Shaji-en-Y   Resolved: 11/17/09    INCISION AND DRAINAGE OF WOUND Right 2/26/2017    Procedure: INCISION AND DRAINAGE (I&D) EXTREMITY WITH APPLICATION OF GRAFT JACKET;  Surgeon: Stoney Hall DPM;  [facility];  Service:     INCISION AND DRAINAGE OF WOUND Right 4/25/2017    Procedure: INCISION AND DRAINAGE (I&D) EXTREMITY, APPLICATION OF GRAFT;  Surgeon: Stoney Hall DPM;  [facility];  Service:     IR BIOPSY OTHER  7/2/2020    IR LOWER EXTREMITY ANGIOGRAM  2/8/2021    IR LOWER EXTREMITY ANGIOGRAM  2/11/2021    IR PORT PLACEMENT  1/17/2022    IR TUNNELED CENTRAL LINE PLACEMENT  12/24/2020    JOINT REPLACEMENT      christofer knees replaced    NM AMPUTATION METATARSAL+TOE,SINGLE Left 12/21/2020    Procedure: RAY RESECTION FOOT;  Surgeon: Ricardo Alvarez DPM;  [facility];  Service: Podiatry    NM AMPUTATION METATARSAL+TOE,SINGLE Left 12/31/2020    Procedure: 5TH MET RESECTION;  Surgeon: Maxine Mills DEJUAN De Jesus;  [facility];  Service: Podiatry    TN CYSTOURETHROSCOPY W/IRRIG & EVAC CLOTS N/A 2/10/2021    Procedure: CYSTOSCOPY EVACUATION OF CLOTS, fulguration;  Surgeon: Tiffany Ch MD;  [facility];  Service: Urology    TN CYSTOURETHROSCOPY W/IRRIG & EVAC CLOTS N/A 10/24/2021    Procedure: CYSTOSCOPY EVACUATION OF CLOT, fulguration of bleeding vessels, right ureter stent placement, retrograde pyelogram;  Surgeon: Vernon Goltz, MD;  [facility];  Service: Urology    TN CYSTOURETHROSCOPY,BIOPSY N/A 8/16/2016    Procedure: CYSTOSCOPY WITH BIOPSIES;  Surgeon: Madeline Morgan MD;  [facility];  Service: Urology    TN CYSTOURETHROSCOPY,FULGUR <0 5 CM LESN N/A 11/19/2020    Procedure: CYSTO W/BIOPSIES, transurethral prostate bx;  Surgeon: Faustino Richard MD;  [facility];  Service: Urology    TN CYSTOURETHROSCOPY,FULGUR >5 CM LESN Bilateral 10/18/2021    Procedure: TRANSURETHRAL RESECTION OF BLADDER TUMOR (TURBT);   Surgeon: Alex Pires MD;  [facility];  Service: Urology    [facility] [address] [facility] 2/8/2021    Procedure: LEG angiogram, CO2 w/limited contrast with balloon angioplasty postertior tibial artery;  Surgeon: Sohail Renee MD;  [facility];  Service: Vascular    ROTATOR CUFF REPAIR      SMALL INTESTINE SURGERY      Surgery Shaji-en-Y    SPINAL FUSION      lumbar and cervical fusions    VAC DRESSING APPLICATION Right 7/66/2494    Procedure: APPLICATION VAC DRESSING;  Surgeon: Cj Alejandre DPM;  [facility];  Service:    [address] [place] [address] Left 2/16/2021    Procedure: FOOT DEBRIDE, [address] OUT w/graft application;  Surgeon: Cj Alejandre DPM;  [facility];  Service: Podiatry       Current Outpatient Medications   Medication Sig Dispense Refill    acetaminophen (TYLENOL) 325 mg tablet Take 650 mg by mouth every 6 (six) hours as needed for mild pain      ALPRAZolam Helene Banuelos) 0 25 mg tablet At twice a day as needed for anxiety 30 tablet 0    aspirin (ECOTRIN LOW STRENGTH) 81 mg EC tablet Take 1 tablet (81 mg total) by mouth daily      Azelastine HCl 0 15 % SOLN Inhale 1 spray 2 (two) times a day 30 mL 3    Bimatoprost (LUMIGAN OP) Apply 1 drop to eye daily at bedtime       calcitriol (ROCALTROL) 0 25 mcg capsule Take 1 capsule (0 25 mcg total) by mouth daily 90 capsule 0    docusate sodium (COLACE) 100 mg capsule Take 1 capsule (100 mg total) by mouth 2 (two) times a day as needed for constipation 100 capsule 0    DULoxetine (CYMBALTA) 20 mg capsule Take 1 capsule (20 mg total) by mouth daily 90 capsule 0    ezetimibe (ZETIA) 10 mg tablet Take 1 tablet (10 mg total) by mouth daily in the early morning 90 tablet 0    Ferrous Sulfate Dried (Feosol) 200 (65 Fe) MG TABS Take 65 mg by mouth daily 90 tablet 0    fluocinonide (LIDEX) 0 05 % cream Apply topically 2 (two) times a day (Patient taking differently: Apply topically as needed ) 30 g 0    [START ON 2/21/2022] fluorouracil 5,975 mg in CADD infusion pump Infuse 5,975 mg (500 mg/m2/day x 2 39 m2) into a venous catheter over 120 hours for 5 days  Do not start before February 21, 2022  1 each 0    fluticasone (FLONASE) 50 mcg/act nasal spray 1 spray into each nostril daily (Patient taking differently: 1 spray into each nostril as needed ) 11 mL 1    furosemide (LASIX) 20 mg tablet TAKE 1 TABLET BY MOUTH AS NEEDED FOR EDEMA 90 tablet 1    GABAPENTIN PO Take 200 mg by mouth      HYDROcodone-acetaminophen (NORCO) 5-325 mg per tablet 1-2 tab every 6 hr if needed for pain 30 tablet 0    Insulin Pen Needle 31G X 8 MM MISC Inject 3 times a day 100 each 2    isosorbide mononitrate (IMDUR) 30 mg 24 hr tablet Take 1 tablet (30 mg total) by mouth daily in the early morning 90 tablet 0    Lantus SoloStar 100 units/mL injection pen 18 units qhs (Patient taking differently: Inject 18 Units under the skin daily at bedtime ) 30 mL 1    lidocaine-prilocaine (EMLA) cream Apply topically as needed for mild pain 30 g 0    loperamide (IMODIUM) 2 mg capsule Take 2 mg by mouth 4 (four) times a day as needed for diarrhea      metoprolol succinate (TOPROL-XL) 100 mg 24 hr tablet Take 1 tablet (100 mg total) by mouth daily for 7 days 90 tablet 0    multivitamin (THERAGRAN) TABS Take 1 tablet by mouth daily   naloxone (NARCAN) 4 mg/0 1 mL nasal spray Administer 1 spray into a nostril  If no response after 2-3 minutes, give another dose in the other nostril using a new spray  1 each 1    nitrofurantoin (MACRODANTIN) 100 mg capsule One capsule p o  B i d  10 capsule 0    NovoLOG FlexPen 100 units/mL injection pen 10 units with each meal  (Patient taking differently: Inject 10 Units under the skin 3 (three) times a day with meals ) 5 pen 1    omeprazole (PriLOSEC) 20 mg delayed release capsule Take 20 mg by mouth daily in the early morning        oxybutynin (DITROPAN) 5 mg tablet Take 1 tablet (5 mg total) by mouth 3 (three) times a day 15 tablet 0    sertraline (ZOLOFT) 50 mg tablet Take 1 tablet (50 mg total) by mouth daily 90 tablet 0    tamsulosin (FLOMAX) 0 4 mg Take 1 capsule (0 4 mg total) by mouth daily at bedtime 90 capsule 0     No current facility-administered medications for this visit  Allergies   Allergen Reactions    Atorvastatin Hives, Itching and Rash    Simvastatin Rash and Edema     Edema of lower legs    Statins Hives and Itching    Insulin Lispro Swelling and Edema     " Lower Legs"    Other Itching, Rash and Other (See Comments)     "EKG Patches"   "blue EKG patches"       Review of Systems   Constitutional: Negative  Negative for activity change, appetite change, fatigue, fever and unexpected weight change  HENT: Negative for congestion, ear pain, hearing loss, mouth sores, postnasal drip, rhinorrhea, sore throat, trouble swallowing and voice change  Eyes: Negative for pain, redness and visual disturbance  Respiratory: Negative for cough, chest tightness, shortness of breath and wheezing  Cardiovascular: Negative for chest pain, palpitations and leg swelling  Gastrointestinal: Negative for abdominal distention, abdominal pain, blood in stool, constipation, diarrhea and nausea  Endocrine: Negative for cold intolerance, heat intolerance, polydipsia, polyphagia and polyuria  Genitourinary: Positive for dysuria, frequency and urgency  Negative for difficulty urinating, flank pain and hematuria  Musculoskeletal: Negative for arthralgias, back pain, gait problem, joint swelling and myalgias  Skin: Negative for color change and pallor  Neurological: Negative for dizziness, tremors, seizures, syncope, weakness, numbness and headaches  Hematological: Negative for adenopathy  Does not bruise/bleed easily  Psychiatric/Behavioral: Negative  Negative for sleep disturbance  The patient is not nervous/anxious  Video Exam    There were no vitals filed for this visit  Physical Exam  Constitutional:       General: He is not in acute distress  Appearance: He is obese  He is ill-appearing  He is not toxic-appearing or diaphoretic  Pulmonary:      Effort: Pulmonary effort is normal  No respiratory distress  Skin:     Findings: No rash  Neurological:      Mental Status: He is oriented to person, place, and time  Psychiatric:         Behavior: Behavior normal           I spent 15 minutes directly with the patient during this visit    [address] verbally agrees to participate in [employer]  Pt is aware that [employer] could be limited without vital signs or the ability to perform a full hands-on physical exam  Emiliano Chakraborty understands he or the provider may request at any time to terminate the video visit and request the patient to seek care or treatment in person

## 2022-02-15 NOTE — PATIENT INSTRUCTIONS
Order Macrodantin 100 mg twice a day for 5 days renally adjusted  She should have a urine culture check with a urinalysis whenever he can  Needs to come to the office in 1 week    If you develop fever chills severe bladder pain to go to the emergency room immediately

## 2022-02-16 ENCOUNTER — APPOINTMENT (OUTPATIENT)
Dept: RADIATION ONCOLOGY | Facility: HOSPITAL | Age: 77
End: 2022-02-16
Attending: INTERNAL MEDICINE
Payer: MEDICARE

## 2022-02-16 PROCEDURE — 77427 RADIATION TX MANAGEMENT X5: CPT | Performed by: INTERNAL MEDICINE

## 2022-02-16 PROCEDURE — 77386 HB NTSTY MODUL RAD TX DLVR CPLX: CPT | Performed by: INTERNAL MEDICINE

## 2022-02-16 PROCEDURE — 77014 CHG CT GUIDANCE PLACEMENT RAD THERAPY FIELDS: CPT | Performed by: INTERNAL MEDICINE

## 2022-02-17 ENCOUNTER — RA CDI HCC (OUTPATIENT)
Dept: OTHER | Facility: HOSPITAL | Age: 77
End: 2022-02-17

## 2022-02-17 ENCOUNTER — APPOINTMENT (OUTPATIENT)
Dept: LAB | Facility: HOSPITAL | Age: 77
End: 2022-02-17
Attending: UROLOGY
Payer: MEDICARE

## 2022-02-17 ENCOUNTER — APPOINTMENT (OUTPATIENT)
Dept: RADIATION ONCOLOGY | Facility: HOSPITAL | Age: 77
End: 2022-02-17
Attending: INTERNAL MEDICINE
Payer: MEDICARE

## 2022-02-17 ENCOUNTER — HOSPITAL ENCOUNTER (OUTPATIENT)
Dept: RADIOLOGY | Facility: HOSPITAL | Age: 77
Discharge: HOME | End: 2022-02-17
Payer: MEDICARE

## 2022-02-17 ENCOUNTER — TELEPHONE (OUTPATIENT)
Dept: UROLOGY | Facility: MEDICAL CENTER | Age: 77
End: 2022-02-17

## 2022-02-17 ENCOUNTER — TELEPHONE (OUTPATIENT)
Dept: UROLOGY | Facility: AMBULATORY SURGERY CENTER | Age: 77
End: 2022-02-17

## 2022-02-17 ENCOUNTER — TELEPHONE (OUTPATIENT)
Dept: RADIATION ONCOLOGY | Facility: HOSPITAL | Age: 77
End: 2022-02-17

## 2022-02-17 DIAGNOSIS — N20.0 KIDNEY STONE: ICD-10-CM

## 2022-02-17 DIAGNOSIS — R39.89 SUSPECTED UTI: Primary | ICD-10-CM

## 2022-02-17 DIAGNOSIS — R10.9 FLANK PAIN: ICD-10-CM

## 2022-02-17 DIAGNOSIS — N30.00 ACUTE CYSTITIS WITHOUT HEMATURIA: ICD-10-CM

## 2022-02-17 DIAGNOSIS — C67.3 MALIGNANT NEOPLASM OF ANTERIOR WALL OF URINARY BLADDER (HCC): Primary | ICD-10-CM

## 2022-02-17 LAB
BACTERIA UR QL AUTO: ABNORMAL /HPF
BILIRUB UR QL STRIP: NEGATIVE
CLARITY UR: ABNORMAL
COLOR UR: YELLOW
GLUCOSE UR STRIP-MCNC: ABNORMAL MG/DL
HGB UR QL STRIP.AUTO: ABNORMAL
KETONES UR STRIP-MCNC: NEGATIVE MG/DL
LEUKOCYTE ESTERASE UR QL STRIP: ABNORMAL
NITRITE UR QL STRIP: NEGATIVE
NON-SQ EPI CELLS URNS QL MICRO: ABNORMAL /HPF
PH UR STRIP.AUTO: 6 [PH]
PROT UR STRIP-MCNC: >=300 MG/DL
RBC #/AREA URNS AUTO: ABNORMAL /HPF
SP GR UR STRIP.AUTO: 1.02 (ref 1–1.03)
UROBILINOGEN UR QL STRIP.AUTO: 0.2 E.U./DL
WBC #/AREA URNS AUTO: ABNORMAL /HPF

## 2022-02-17 PROCEDURE — 77014 CHG CT GUIDANCE PLACEMENT RAD THERAPY FIELDS: CPT | Performed by: RADIOLOGY

## 2022-02-17 PROCEDURE — 77386 HB NTSTY MODUL RAD TX DLVR CPLX: CPT | Performed by: RADIOLOGY

## 2022-02-17 PROCEDURE — 87086 URINE CULTURE/COLONY COUNT: CPT

## 2022-02-17 PROCEDURE — 76770 US EXAM ABDO BACK WALL COMP: CPT

## 2022-02-17 PROCEDURE — 87186 SC STD MICRODIL/AGAR DIL: CPT

## 2022-02-17 PROCEDURE — 87077 CULTURE AEROBIC IDENTIFY: CPT

## 2022-02-17 PROCEDURE — 81001 URINALYSIS AUTO W/SCOPE: CPT

## 2022-02-17 RX ORDER — LIDOCAINE HYDROCHLORIDE 20 MG/ML
JELLY TOPICAL AS NEEDED
Qty: 30 ML | Refills: 0 | Status: SHIPPED | OUTPATIENT
Start: 2022-02-17

## 2022-02-17 NOTE — TELEPHONE ENCOUNTER
1130-Call to Urology-Dr Claire Border office  Chase Acosta and EVANGELINA Rincon  RN Clinical-Pt for RT tx today  Fx 17/20  C/O 10/10 pain-penile pain  Recent hx of UTI and ABT  Eval by physician  Pt did not want to go the ER for further eval   R/O possible Kidney Calculi  Call to [address] follows w/Dr Ad Chowdhury  Spoke w/Milla  NP to order stat U/S Kidney and Bladder  and UA//C&S  Writer scheduled stat U/S for today/[facility] site @ 5320  Urine to be done today  Rx:Lidocaine Urojet escribed to pharmacy  Pt aware of information above and in agreement   with planning  Pt aware to seek ER eval w/change of status, increased pain     tmb

## 2022-02-17 NOTE — TELEPHONE ENCOUNTER
Veronica Huitron from IR called stating patient is there now and Dr Lane Aguillon would like for patient to be seen today  Patient having a lot of pain or should he go to ER  Called back line at [place] office   Warm transfer to [facility] Doctor

## 2022-02-17 NOTE — PROGRESS NOTES
[facility]  coding opportunities        E11 42, E11 22, I73 9, E11 51 and Z89 [id number]     Chart Reviewed * (Number of) Inbasket suggestions sent to Provider: 5                  Patients insurance company: [employer]

## 2022-02-17 NOTE — TELEPHONE ENCOUNTER
Called and LM for patient to see if he was able to schedule renal US and if he was or did go for urine testing  Advised results take 2-3 days to come back  Also informed that he can take OTC meds like Tylenol and Motrin for the pain and use a heating pad on his back  Advised on ER precautions  Provided call back number for questions  Patient normally sees Dr Yudelka Jimenez [facility] [place]

## 2022-02-17 NOTE — PROGRESS NOTES
Mr Praveen Andres is at 16 out of 20 fractions of chemoradiation therapy for bladder cancer  He notes significant penile pain with any motion or manipulation of the penis  The pain lasts about 30-45 seconds and is excruciating but then completely resolves  He also has tenesmus and rectal urgency  I reviewed that the penile pain is not related to radiation therapy as this is out of the radiation field  His symptoms resemble having a kidney stone, however he has never had a kidney stone that he can recall  He has a history of recurrent UTIs and completed a course of Levaquin and then Amoxicillin at the initiation of RT  On  examination, the skin on/around the penis and glans is normal, and there is no erythema or discharge suggestive of superficial infection  His perirectal skin is intact and there is no dermatitis  I reviewed that he might be having another UTI, a kidney stone, or perhaps he is passing tumor clots which are painful  We will reach out to Urology to see if he can be evaluated  In the meantime, we also offered Urojet (Lidocaine) to be used periurethrally for some relief  I also reviewed that he should call 911 or go to the nearest ER if the pain recurs, persists, is unbearable, or if he has hematuria  He understands

## 2022-02-17 NOTE — TELEPHONE ENCOUNTER
9206-Call from pharmacist at [facility]  Lidocaine(Urojet) requires preauth (48-72hrs)/cost /$120 00without preauth  Lidocaine (Criselda Care Gel) 2%  For external use-Requires no pre auth and costs $25 00/tube  Call to pt  Pt wants   $ 25 00/tube Rx  Reviewed w/Dr Girma Feliz  Call to pharmacy-aware pt will purchase the Lidocaine (Criselda Care Gel) 2%

## 2022-02-17 NOTE — TELEPHONE ENCOUNTER
Radiation called stating they had patient there for bladder cancer treatments  Treatments were set at 17 fractions/20  Has had UTIs 2 times in the past that were treated  Patient is very uncomfortable complaining of penile and back pain, positive for nausea, no blood and urinating just fine  Patient believes it is kidney stone pain  Chayo Overlie and advised that we will put STAT US order in and send patient for urine testing  Mateo Portal from radiation, will assist patient to set up [address]  Advised he can take OTC meds for pain and heating pad to back area to help  Patient is to increase water intake as well

## 2022-02-17 NOTE — TELEPHONE ENCOUNTER
Last CT scan performed in November 2021 showed no evidence of renal calculi  Recommend patient go for urine testing  Ultrasound ordered  Will call with results  Please review ER precautions

## 2022-02-18 ENCOUNTER — APPOINTMENT (OUTPATIENT)
Dept: RADIATION ONCOLOGY | Facility: HOSPITAL | Age: 77
End: 2022-02-18
Attending: INTERNAL MEDICINE
Payer: MEDICARE

## 2022-02-18 ENCOUNTER — TELEPHONE (OUTPATIENT)
Dept: OTHER | Facility: OTHER | Age: 77
End: 2022-02-18

## 2022-02-18 DIAGNOSIS — N39.0 URINARY TRACT INFECTION WITHOUT HEMATURIA, SITE UNSPECIFIED: Primary | ICD-10-CM

## 2022-02-18 PROCEDURE — 77386 HB NTSTY MODUL RAD TX DLVR CPLX: CPT | Performed by: STUDENT IN AN ORGANIZED HEALTH CARE EDUCATION/TRAINING PROGRAM

## 2022-02-18 PROCEDURE — 77014 CHG CT GUIDANCE PLACEMENT RAD THERAPY FIELDS: CPT | Performed by: STUDENT IN AN ORGANIZED HEALTH CARE EDUCATION/TRAINING PROGRAM

## 2022-02-18 NOTE — TELEPHONE ENCOUNTER
Results of urine culture have not been received    We will decide on antibiotics if necessary based on sensitivity

## 2022-02-18 NOTE — TELEPHONE ENCOUNTER
Called Mikal De La Rosa and notified him of results of U/S- stent still visible  Encouraged him to hydrate , use ibuprofen and moist heating pad    He understood,  Also reminded him of appointment with Dr Bea Valerio on 4/13

## 2022-02-19 LAB — BACTERIA UR CULT: ABNORMAL

## 2022-02-20 RX ORDER — LEVOFLOXACIN 500 MG/1
500 TABLET, FILM COATED ORAL EVERY 24 HOURS
Qty: 7 TABLET | Refills: 0 | Status: SHIPPED | OUTPATIENT
Start: 2022-02-20 | End: 2022-03-02 | Stop reason: HOSPADM

## 2022-02-21 ENCOUNTER — TELEPHONE (OUTPATIENT)
Dept: RADIATION ONCOLOGY | Facility: HOSPITAL | Age: 77
End: 2022-02-21

## 2022-02-21 ENCOUNTER — TELEPHONE (OUTPATIENT)
Dept: HEMATOLOGY ONCOLOGY | Facility: CLINIC | Age: 77
End: 2022-02-21

## 2022-02-21 ENCOUNTER — APPOINTMENT (OUTPATIENT)
Dept: RADIATION ONCOLOGY | Facility: HOSPITAL | Age: 77
End: 2022-02-21
Attending: INTERNAL MEDICINE
Payer: MEDICARE

## 2022-02-21 NOTE — TELEPHONE ENCOUNTER
1812-Return call from pt  Pt notes "I'm sick  I have a UTI"  Pt notes starting on ABT today-Rx'd by urology  Pt is fx 18/20-encouraged to complete RT tx  Pt wants to see how he feels tomorrow before deciding if he wants to complete the last two RT txmts  Emotional support offered

## 2022-02-21 NOTE — TELEPHONE ENCOUNTER
1248-follow up call to pt  Pt did not come in for RT tx today   "too much pain"  Recent UTI/Urology referral and care last week as noted  No answer at phone #  No message left/retry on Tues, 2 22, if no   Show for RT appt

## 2022-02-21 NOTE — TELEPHONE ENCOUNTER
----- Message from Jessica Varner RN sent at 2/21/2022 12:57 PM EST -----  Regarding: clarification  This patient did not show for apt today for day 29 Mitomycin  and Fluourucil  Can you advise on plan and if he should be rescheduled?

## 2022-02-21 NOTE — TELEPHONE ENCOUNTER
Left message per communication form advising patient of + UC and antibiotic has been sent to his pharmacy  Office number provided with any questions or concerns

## 2022-02-21 NOTE — TELEPHONE ENCOUNTER
Attempted to call patient but forwarded to   Left VM for patient to call our office back to discuss next steps for treatment

## 2022-02-22 ENCOUNTER — APPOINTMENT (OUTPATIENT)
Dept: RADIATION ONCOLOGY | Facility: HOSPITAL | Age: 77
End: 2022-02-22
Attending: INTERNAL MEDICINE
Payer: MEDICARE

## 2022-02-22 DIAGNOSIS — C67.3 MALIGNANT NEOPLASM OF ANTERIOR WALL OF URINARY BLADDER (HCC): Primary | ICD-10-CM

## 2022-02-22 RX ORDER — OXYCODONE HYDROCHLORIDE AND ACETAMINOPHEN 5; 325 MG/1; MG/1
1 TABLET ORAL EVERY 6 HOURS PRN
Qty: 20 TABLET | Refills: 0 | Status: ON HOLD | OUTPATIENT
Start: 2022-02-22 | End: 2022-03-02 | Stop reason: SDUPTHER

## 2022-02-22 NOTE — TELEPHONE ENCOUNTER
1030-Return physician call to the pt  Pt c/o penile burning and pain  Current ABT per Urology for UTI  Plan-Hold RT tx for today  Pt  to call on Wed-decide to tx on Wed/Thurs to complete RT txmt or Thurs/Fri to complete txmt time  EOT was planned as 2 25 22  Rx-Percocet 5-325mg #20 one tab q  6 hr prn pain  No refill  Escribed to Cedric Dennison  Call to Jami Panchal RT-[place]-aware of above planning for the pt  for scheduling purposes  Pt had telehealth visit w/Urology @ [facility] last week  Surgery was not noted to be optimal option for pt at this time  Physician to review future planning with pt at EOT visit this week

## 2022-02-23 ENCOUNTER — APPOINTMENT (EMERGENCY)
Dept: CT IMAGING | Facility: HOSPITAL | Age: 77
DRG: 690 | End: 2022-02-23
Payer: MEDICARE

## 2022-02-23 ENCOUNTER — TELEPHONE (OUTPATIENT)
Dept: UROLOGY | Facility: MEDICAL CENTER | Age: 77
End: 2022-02-23

## 2022-02-23 ENCOUNTER — OFFICE VISIT (OUTPATIENT)
Dept: UROLOGY | Facility: MEDICAL CENTER | Age: 77
End: 2022-02-23
Payer: MEDICARE

## 2022-02-23 ENCOUNTER — OFFICE VISIT (OUTPATIENT)
Dept: INTERNAL MEDICINE CLINIC | Facility: CLINIC | Age: 77
End: 2022-02-23
Payer: MEDICARE

## 2022-02-23 ENCOUNTER — HOSPITAL ENCOUNTER (INPATIENT)
Facility: HOSPITAL | Age: 77
LOS: 7 days | Discharge: HOME HEALTH | DRG: 690 | End: 2022-03-02
Attending: EMERGENCY MEDICINE | Admitting: INTERNAL MEDICINE
Payer: MEDICARE

## 2022-02-23 VITALS
HEART RATE: 84 BPM | BODY MASS INDEX: 30.48 KG/M2 | HEIGHT: 73 IN | DIASTOLIC BLOOD PRESSURE: 66 MMHG | SYSTOLIC BLOOD PRESSURE: 120 MMHG | WEIGHT: 230 LBS

## 2022-02-23 VITALS
BODY MASS INDEX: 30.48 KG/M2 | SYSTOLIC BLOOD PRESSURE: 158 MMHG | TEMPERATURE: 97.6 F | HEIGHT: 73 IN | WEIGHT: 230 LBS | DIASTOLIC BLOOD PRESSURE: 90 MMHG | HEART RATE: 86 BPM | RESPIRATION RATE: 13 BRPM

## 2022-02-23 DIAGNOSIS — F11.20 CONTINUOUS OPIOID DEPENDENCE (HCC): ICD-10-CM

## 2022-02-23 DIAGNOSIS — Z79.4 TYPE 2 DIABETES MELLITUS WITH STAGE 3 CHRONIC KIDNEY DISEASE, WITH LONG-TERM CURRENT USE OF INSULIN, UNSPECIFIED WHETHER STAGE 3A OR 3B CKD (HCC): Primary | ICD-10-CM

## 2022-02-23 DIAGNOSIS — E78.2 MIXED HYPERLIPIDEMIA: ICD-10-CM

## 2022-02-23 DIAGNOSIS — J42 CHRONIC BRONCHITIS, UNSPECIFIED CHRONIC BRONCHITIS TYPE (HCC): ICD-10-CM

## 2022-02-23 DIAGNOSIS — N30.90 CYSTITIS: Primary | ICD-10-CM

## 2022-02-23 DIAGNOSIS — E11.621 DIABETIC ULCER OF LEFT MIDFOOT ASSOCIATED WITH TYPE 2 DIABETES MELLITUS, WITH BONE INVOLVEMENT WITHOUT EVIDENCE OF NECROSIS (HCC): ICD-10-CM

## 2022-02-23 DIAGNOSIS — I12.9 HYPERTENSION WITH RENAL DISEASE: ICD-10-CM

## 2022-02-23 DIAGNOSIS — C67.9 BLADDER CARCINOMA (HCC): ICD-10-CM

## 2022-02-23 DIAGNOSIS — N25.81 SECONDARY RENAL HYPERPARATHYROIDISM (HCC): ICD-10-CM

## 2022-02-23 DIAGNOSIS — D09.0 CARCINOMA IN SITU OF BLADDER: ICD-10-CM

## 2022-02-23 DIAGNOSIS — D69.6 THROMBOCYTOPENIA (HCC): ICD-10-CM

## 2022-02-23 DIAGNOSIS — C67.9 BLADDER CANCER (HCC): ICD-10-CM

## 2022-02-23 DIAGNOSIS — I25.118 CORONARY ARTERY DISEASE OF NATIVE ARTERY OF NATIVE HEART WITH STABLE ANGINA PECTORIS (HCC): ICD-10-CM

## 2022-02-23 DIAGNOSIS — F32.1 MODERATE MAJOR DEPRESSION, SINGLE EPISODE (HCC): ICD-10-CM

## 2022-02-23 DIAGNOSIS — N17.9 AKI (ACUTE KIDNEY INJURY) (HCC): ICD-10-CM

## 2022-02-23 DIAGNOSIS — N18.30 TYPE 2 DIABETES MELLITUS WITH STAGE 3 CHRONIC KIDNEY DISEASE, WITH LONG-TERM CURRENT USE OF INSULIN, UNSPECIFIED WHETHER STAGE 3A OR 3B CKD (HCC): Primary | ICD-10-CM

## 2022-02-23 DIAGNOSIS — I45.9 STOKES-ADAMS SYNCOPE: ICD-10-CM

## 2022-02-23 DIAGNOSIS — C67.3 MALIGNANT NEOPLASM OF ANTERIOR WALL OF URINARY BLADDER (HCC): ICD-10-CM

## 2022-02-23 DIAGNOSIS — K62.89 PROCTITIS: ICD-10-CM

## 2022-02-23 DIAGNOSIS — N18.4 CKD (CHRONIC KIDNEY DISEASE) STAGE 4, GFR 15-29 ML/MIN (HCC): ICD-10-CM

## 2022-02-23 DIAGNOSIS — E11.22 TYPE 2 DIABETES MELLITUS WITH STAGE 3 CHRONIC KIDNEY DISEASE, WITH LONG-TERM CURRENT USE OF INSULIN, UNSPECIFIED WHETHER STAGE 3A OR 3B CKD (HCC): Primary | ICD-10-CM

## 2022-02-23 DIAGNOSIS — I70.209 ARTERIOSCLEROSIS OF ARTERY OF EXTREMITY (HCC): ICD-10-CM

## 2022-02-23 DIAGNOSIS — T87.89 OTHER COMPLICATIONS OF AMPUTATION STUMP (HCC): ICD-10-CM

## 2022-02-23 DIAGNOSIS — N13.30 BILATERAL HYDRONEPHROSIS: ICD-10-CM

## 2022-02-23 DIAGNOSIS — L97.426 DIABETIC ULCER OF LEFT MIDFOOT ASSOCIATED WITH TYPE 2 DIABETES MELLITUS, WITH BONE INVOLVEMENT WITHOUT EVIDENCE OF NECROSIS (HCC): ICD-10-CM

## 2022-02-23 DIAGNOSIS — C67.3 MALIGNANT NEOPLASM OF ANTERIOR WALL OF URINARY BLADDER (HCC): Primary | ICD-10-CM

## 2022-02-23 DIAGNOSIS — Z96.0 RETAINED URETERAL STENT: Chronic | ICD-10-CM

## 2022-02-23 DIAGNOSIS — I74.3 EMBOLISM AND THROMBOSIS OF ARTERIES OF THE LOWER EXTREMITIES (HCC): ICD-10-CM

## 2022-02-23 PROBLEM — N39.0 UTI (URINARY TRACT INFECTION): Status: ACTIVE | Noted: 2022-02-23

## 2022-02-23 LAB
ALBUMIN SERPL BCP-MCNC: 3.1 G/DL (ref 3.5–5)
ALP SERPL-CCNC: 200 U/L (ref 46–116)
ALT SERPL W P-5'-P-CCNC: 97 U/L (ref 12–78)
ANION GAP SERPL CALCULATED.3IONS-SCNC: 12 MMOL/L (ref 4–13)
APTT PPP: 39 SECONDS (ref 23–37)
AST SERPL W P-5'-P-CCNC: 75 U/L (ref 5–45)
BACTERIA UR QL AUTO: ABNORMAL /HPF
BASOPHILS # BLD AUTO: 0.03 THOUSANDS/ΜL (ref 0–0.1)
BASOPHILS NFR BLD AUTO: 0 % (ref 0–1)
BILIRUB SERPL-MCNC: 0.25 MG/DL (ref 0.2–1)
BILIRUB UR QL STRIP: NEGATIVE
BUN SERPL-MCNC: 55 MG/DL (ref 5–25)
CALCIUM ALBUM COR SERPL-MCNC: 9.8 MG/DL (ref 8.3–10.1)
CALCIUM SERPL-MCNC: 9.1 MG/DL (ref 8.3–10.1)
CHLORIDE SERPL-SCNC: 105 MMOL/L (ref 100–108)
CLARITY UR: ABNORMAL
CO2 SERPL-SCNC: 22 MMOL/L (ref 21–32)
COLOR UR: ABNORMAL
CREAT SERPL-MCNC: 4.04 MG/DL (ref 0.6–1.3)
EOSINOPHIL # BLD AUTO: 0.13 THOUSAND/ΜL (ref 0–0.61)
EOSINOPHIL NFR BLD AUTO: 2 % (ref 0–6)
ERYTHROCYTE [DISTWIDTH] IN BLOOD BY AUTOMATED COUNT: 15.1 % (ref 11.6–15.1)
GFR SERPL CREATININE-BSD FRML MDRD: 13 ML/MIN/1.73SQ M
GLUCOSE SERPL-MCNC: 157 MG/DL (ref 65–140)
GLUCOSE SERPL-MCNC: 227 MG/DL (ref 65–140)
GLUCOSE UR STRIP-MCNC: NEGATIVE MG/DL
HCT VFR BLD AUTO: 30 % (ref 36.5–49.3)
HGB BLD-MCNC: 10.4 G/DL (ref 12–17)
HGB UR QL STRIP.AUTO: ABNORMAL
IMM GRANULOCYTES # BLD AUTO: 0.04 THOUSAND/UL (ref 0–0.2)
IMM GRANULOCYTES NFR BLD AUTO: 1 % (ref 0–2)
INR PPP: 1.06 (ref 0.84–1.19)
KETONES UR STRIP-MCNC: NEGATIVE MG/DL
LACTATE SERPL-SCNC: 1.1 MMOL/L (ref 0.5–2)
LEUKOCYTE ESTERASE UR QL STRIP: ABNORMAL
LYMPHOCYTES # BLD AUTO: 0.9 THOUSANDS/ΜL (ref 0.6–4.47)
LYMPHOCYTES NFR BLD AUTO: 13 % (ref 14–44)
MCH RBC QN AUTO: 31.9 PG (ref 26.8–34.3)
MCHC RBC AUTO-ENTMCNC: 34.7 G/DL (ref 31.4–37.4)
MCV RBC AUTO: 92 FL (ref 82–98)
MONOCYTES # BLD AUTO: 0.77 THOUSAND/ΜL (ref 0.17–1.22)
MONOCYTES NFR BLD AUTO: 11 % (ref 4–12)
NEUTROPHILS # BLD AUTO: 5.32 THOUSANDS/ΜL (ref 1.85–7.62)
NEUTS SEG NFR BLD AUTO: 73 % (ref 43–75)
NITRITE UR QL STRIP: NEGATIVE
NON-SQ EPI CELLS URNS QL MICRO: ABNORMAL /HPF
NRBC BLD AUTO-RTO: 0 /100 WBCS
PH UR STRIP.AUTO: 6 [PH]
PLATELET # BLD AUTO: 187 THOUSANDS/UL (ref 149–390)
PLATELET # BLD AUTO: 196 THOUSANDS/UL (ref 149–390)
PMV BLD AUTO: 9.8 FL (ref 8.9–12.7)
PMV BLD AUTO: 9.9 FL (ref 8.9–12.7)
POST-VOID RESIDUAL VOLUME, ML POC: 7 ML
POTASSIUM SERPL-SCNC: 5.1 MMOL/L (ref 3.5–5.3)
PROCALCITONIN SERPL-MCNC: 0.53 NG/ML
PROCALCITONIN SERPL-MCNC: 0.53 NG/ML
PROT SERPL-MCNC: 7.5 G/DL (ref 6.4–8.2)
PROT UR STRIP-MCNC: ABNORMAL MG/DL
PROTHROMBIN TIME: 13.6 SECONDS (ref 11.6–14.5)
RBC # BLD AUTO: 3.26 MILLION/UL (ref 3.88–5.62)
RBC #/AREA URNS AUTO: ABNORMAL /HPF
SL AMB  POCT GLUCOSE, UA: ABNORMAL
SL AMB LEUKOCYTE ESTERASE,UA: ABNORMAL
SL AMB POCT BILIRUBIN,UA: ABNORMAL
SL AMB POCT BLOOD,UA: ABNORMAL
SL AMB POCT CLARITY,UA: CLEAR
SL AMB POCT COLOR,UA: YELLOW
SL AMB POCT KETONES,UA: ABNORMAL
SL AMB POCT NITRITE,UA: ABNORMAL
SL AMB POCT PH,UA: 6.5
SL AMB POCT SPECIFIC GRAVITY,UA: 1.02
SL AMB POCT URINE PROTEIN: 300
SL AMB POCT UROBILINOGEN: 0.2
SODIUM SERPL-SCNC: 139 MMOL/L (ref 136–145)
SP GR UR STRIP.AUTO: 1.02 (ref 1–1.03)
UROBILINOGEN UR QL STRIP.AUTO: 0.2 E.U./DL
WBC # BLD AUTO: 7.19 THOUSAND/UL (ref 4.31–10.16)
WBC #/AREA URNS AUTO: ABNORMAL /HPF

## 2022-02-23 PROCEDURE — 83605 ASSAY OF LACTIC ACID: CPT | Performed by: EMERGENCY MEDICINE

## 2022-02-23 PROCEDURE — 51798 US URINE CAPACITY MEASURE: CPT | Performed by: PHYSICIAN ASSISTANT

## 2022-02-23 PROCEDURE — 87040 BLOOD CULTURE FOR BACTERIA: CPT | Performed by: EMERGENCY MEDICINE

## 2022-02-23 PROCEDURE — 81003 URINALYSIS AUTO W/O SCOPE: CPT | Performed by: PHYSICIAN ASSISTANT

## 2022-02-23 PROCEDURE — 36415 COLL VENOUS BLD VENIPUNCTURE: CPT | Performed by: EMERGENCY MEDICINE

## 2022-02-23 PROCEDURE — 99223 1ST HOSP IP/OBS HIGH 75: CPT | Performed by: INTERNAL MEDICINE

## 2022-02-23 PROCEDURE — 99214 OFFICE O/P EST MOD 30 MIN: CPT | Performed by: INTERNAL MEDICINE

## 2022-02-23 PROCEDURE — 85025 COMPLETE CBC W/AUTO DIFF WBC: CPT | Performed by: EMERGENCY MEDICINE

## 2022-02-23 PROCEDURE — 96374 THER/PROPH/DIAG INJ IV PUSH: CPT

## 2022-02-23 PROCEDURE — 96361 HYDRATE IV INFUSION ADD-ON: CPT

## 2022-02-23 PROCEDURE — 85049 AUTOMATED PLATELET COUNT: CPT | Performed by: INTERNAL MEDICINE

## 2022-02-23 PROCEDURE — 99285 EMERGENCY DEPT VISIT HI MDM: CPT

## 2022-02-23 PROCEDURE — 84145 PROCALCITONIN (PCT): CPT | Performed by: EMERGENCY MEDICINE

## 2022-02-23 PROCEDURE — 80053 COMPREHEN METABOLIC PANEL: CPT | Performed by: EMERGENCY MEDICINE

## 2022-02-23 PROCEDURE — 85730 THROMBOPLASTIN TIME PARTIAL: CPT | Performed by: EMERGENCY MEDICINE

## 2022-02-23 PROCEDURE — 87086 URINE CULTURE/COLONY COUNT: CPT | Performed by: EMERGENCY MEDICINE

## 2022-02-23 PROCEDURE — G1004 CDSM NDSC: HCPCS

## 2022-02-23 PROCEDURE — 99214 OFFICE O/P EST MOD 30 MIN: CPT | Performed by: PHYSICIAN ASSISTANT

## 2022-02-23 PROCEDURE — 85610 PROTHROMBIN TIME: CPT | Performed by: EMERGENCY MEDICINE

## 2022-02-23 PROCEDURE — 81001 URINALYSIS AUTO W/SCOPE: CPT | Performed by: EMERGENCY MEDICINE

## 2022-02-23 PROCEDURE — 99285 EMERGENCY DEPT VISIT HI MDM: CPT | Performed by: EMERGENCY MEDICINE

## 2022-02-23 PROCEDURE — 74176 CT ABD & PELVIS W/O CONTRAST: CPT

## 2022-02-23 PROCEDURE — 82948 REAGENT STRIP/BLOOD GLUCOSE: CPT

## 2022-02-23 RX ORDER — HEPARIN SODIUM 5000 [USP'U]/ML
5000 INJECTION, SOLUTION INTRAVENOUS; SUBCUTANEOUS EVERY 8 HOURS SCHEDULED
Status: DISCONTINUED | OUTPATIENT
Start: 2022-02-23 | End: 2022-03-02 | Stop reason: HOSPADM

## 2022-02-23 RX ORDER — GABAPENTIN 300 MG/1
300 CAPSULE ORAL
Qty: 30 CAPSULE | Refills: 2 | Status: SHIPPED | OUTPATIENT
Start: 2022-02-23 | End: 2022-05-10 | Stop reason: SDUPTHER

## 2022-02-23 RX ORDER — INSULIN GLARGINE 100 [IU]/ML
18 INJECTION, SOLUTION SUBCUTANEOUS
Status: DISCONTINUED | OUTPATIENT
Start: 2022-02-23 | End: 2022-02-28

## 2022-02-23 RX ORDER — ONDANSETRON 2 MG/ML
4 INJECTION INTRAMUSCULAR; INTRAVENOUS EVERY 6 HOURS PRN
Status: DISCONTINUED | OUTPATIENT
Start: 2022-02-23 | End: 2022-03-02 | Stop reason: HOSPADM

## 2022-02-23 RX ORDER — SODIUM CHLORIDE 9 MG/ML
75 INJECTION, SOLUTION INTRAVENOUS CONTINUOUS
Status: DISCONTINUED | OUTPATIENT
Start: 2022-02-23 | End: 2022-02-26

## 2022-02-23 RX ORDER — HYDROMORPHONE HCL/PF 1 MG/ML
1 SYRINGE (ML) INJECTION EVERY 4 HOURS PRN
Status: DISCONTINUED | OUTPATIENT
Start: 2022-02-23 | End: 2022-02-24

## 2022-02-23 RX ORDER — EZETIMIBE 10 MG/1
10 TABLET ORAL
Status: DISCONTINUED | OUTPATIENT
Start: 2022-02-24 | End: 2022-03-02 | Stop reason: HOSPADM

## 2022-02-23 RX ORDER — DOCUSATE SODIUM 100 MG/1
100 CAPSULE, LIQUID FILLED ORAL 2 TIMES DAILY PRN
Status: DISCONTINUED | OUTPATIENT
Start: 2022-02-23 | End: 2022-03-02 | Stop reason: HOSPADM

## 2022-02-23 RX ORDER — CALCITRIOL 0.25 UG/1
0.25 CAPSULE, LIQUID FILLED ORAL DAILY
Status: DISCONTINUED | OUTPATIENT
Start: 2022-02-24 | End: 2022-03-02 | Stop reason: HOSPADM

## 2022-02-23 RX ORDER — ASPIRIN 81 MG/1
81 TABLET ORAL DAILY
Status: DISCONTINUED | OUTPATIENT
Start: 2022-02-24 | End: 2022-03-02 | Stop reason: HOSPADM

## 2022-02-23 RX ORDER — PANTOPRAZOLE SODIUM 40 MG/1
40 TABLET, DELAYED RELEASE ORAL
Status: DISCONTINUED | OUTPATIENT
Start: 2022-02-24 | End: 2022-03-02 | Stop reason: HOSPADM

## 2022-02-23 RX ORDER — BLOOD SUGAR DIAGNOSTIC
STRIP MISCELLANEOUS
Qty: 200 STRIP | Refills: 0 | Status: SHIPPED | OUTPATIENT
Start: 2022-02-23

## 2022-02-23 RX ORDER — HYDROCODONE BITARTRATE AND ACETAMINOPHEN 5; 325 MG/1; MG/1
1 TABLET ORAL EVERY 6 HOURS PRN
Status: DISCONTINUED | OUTPATIENT
Start: 2022-02-23 | End: 2022-02-24

## 2022-02-23 RX ORDER — SODIUM CHLORIDE 9 MG/ML
3 INJECTION INTRAVENOUS
Status: DISCONTINUED | OUTPATIENT
Start: 2022-02-23 | End: 2022-03-02 | Stop reason: HOSPADM

## 2022-02-23 RX ORDER — OXYBUTYNIN CHLORIDE 5 MG/1
5 TABLET ORAL 3 TIMES DAILY
Status: DISCONTINUED | OUTPATIENT
Start: 2022-02-23 | End: 2022-03-02

## 2022-02-23 RX ORDER — DULOXETIN HYDROCHLORIDE 20 MG/1
20 CAPSULE, DELAYED RELEASE ORAL DAILY
Status: DISCONTINUED | OUTPATIENT
Start: 2022-02-24 | End: 2022-03-02 | Stop reason: HOSPADM

## 2022-02-23 RX ORDER — HYDROMORPHONE HCL/PF 1 MG/ML
0.5 SYRINGE (ML) INJECTION ONCE
Status: COMPLETED | OUTPATIENT
Start: 2022-02-23 | End: 2022-02-23

## 2022-02-23 RX ORDER — ACETAMINOPHEN 325 MG/1
650 TABLET ORAL EVERY 6 HOURS PRN
Status: DISCONTINUED | OUTPATIENT
Start: 2022-02-23 | End: 2022-03-02 | Stop reason: HOSPADM

## 2022-02-23 RX ORDER — TAMSULOSIN HYDROCHLORIDE 0.4 MG/1
0.4 CAPSULE ORAL
Status: DISCONTINUED | OUTPATIENT
Start: 2022-02-23 | End: 2022-03-02 | Stop reason: HOSPADM

## 2022-02-23 RX ORDER — ISOSORBIDE MONONITRATE 30 MG/1
30 TABLET, EXTENDED RELEASE ORAL
Status: DISCONTINUED | OUTPATIENT
Start: 2022-02-24 | End: 2022-03-02 | Stop reason: HOSPADM

## 2022-02-23 RX ORDER — GABAPENTIN 300 MG/1
300 CAPSULE ORAL
Status: DISCONTINUED | OUTPATIENT
Start: 2022-02-23 | End: 2022-03-02 | Stop reason: HOSPADM

## 2022-02-23 RX ORDER — LANCETS
EACH MISCELLANEOUS
Qty: 200 EACH | Refills: 0 | Status: SHIPPED | OUTPATIENT
Start: 2022-02-23

## 2022-02-23 RX ORDER — LEVOFLOXACIN 5 MG/ML
750 INJECTION, SOLUTION INTRAVENOUS ONCE
Status: COMPLETED | OUTPATIENT
Start: 2022-02-23 | End: 2022-02-23

## 2022-02-23 RX ORDER — ALPRAZOLAM 0.25 MG/1
0.25 TABLET ORAL 2 TIMES DAILY PRN
Status: DISCONTINUED | OUTPATIENT
Start: 2022-02-23 | End: 2022-03-02 | Stop reason: HOSPADM

## 2022-02-23 RX ORDER — METOPROLOL SUCCINATE 50 MG/1
100 TABLET, EXTENDED RELEASE ORAL DAILY
Status: DISCONTINUED | OUTPATIENT
Start: 2022-02-24 | End: 2022-03-02 | Stop reason: HOSPADM

## 2022-02-23 RX ADMIN — TAMSULOSIN HYDROCHLORIDE 0.4 MG: 0.4 CAPSULE ORAL at 22:18

## 2022-02-23 RX ADMIN — LEVOFLOXACIN 750 MG: 5 INJECTION, SOLUTION INTRAVENOUS at 16:34

## 2022-02-23 RX ADMIN — GABAPENTIN 300 MG: 300 CAPSULE ORAL at 22:18

## 2022-02-23 RX ADMIN — SODIUM CHLORIDE 75 ML/HR: 9 INJECTION, SOLUTION INTRAVENOUS at 21:12

## 2022-02-23 RX ADMIN — SODIUM CHLORIDE 500 ML: 0.9 INJECTION, SOLUTION INTRAVENOUS at 15:24

## 2022-02-23 RX ADMIN — BIMATOPROST 1 DROP: 0.1 SOLUTION/ DROPS OPHTHALMIC at 22:19

## 2022-02-23 RX ADMIN — HYDROMORPHONE HYDROCHLORIDE 0.5 MG: 1 INJECTION, SOLUTION INTRAMUSCULAR; INTRAVENOUS; SUBCUTANEOUS at 15:25

## 2022-02-23 RX ADMIN — OXYBUTYNIN CHLORIDE 5 MG: 5 TABLET ORAL at 20:30

## 2022-02-23 RX ADMIN — HEPARIN SODIUM 5000 UNITS: 5000 INJECTION INTRAVENOUS; SUBCUTANEOUS at 22:18

## 2022-02-23 RX ADMIN — INSULIN GLARGINE 18 UNITS: 100 INJECTION, SOLUTION SUBCUTANEOUS at 22:18

## 2022-02-23 RX ADMIN — HYDROMORPHONE HYDROCHLORIDE 1 MG: 1 INJECTION, SOLUTION INTRAMUSCULAR; INTRAVENOUS; SUBCUTANEOUS at 20:14

## 2022-02-23 NOTE — TELEPHONE ENCOUNTER
Dr Milly Goldmann received call from pt's PCP Dr Gonzalo Dior  Pt is at his office now with penile and rectal pain  PCP requesting pt to be seen today  Pt scheduled with PA-C for exam at 1:15pm this afternoon

## 2022-02-23 NOTE — PROGRESS NOTES
Assessment/Plan:  Medication change gabapentin 300 at bedtime Flomax to take at night 0 4 cut down the isosorbide mononitrate to 30 mg daily take Lasix 20 only on a p r n  Basis Levaquin 500 was adjusted to every other day due to chronic renal failure stage 4        1  Penis pain and rectal pain came in with the granddaughter  Medications were review he has spasmodic penis pain and rectal pain at confine any abnormality on the physical examination he is undergoing radiation therapy for his bladder cancer  He is taking gabapentin 300 mg at bedtime the pain is spasmodic quite severe he had a bouts of the office here I asked him to go and see the urologist    2  Episodes of syncope  We will know whether he has hypoglycemia or he has orthostatic BP changes we check a blood pressure here in the office and he was the following  150/80  Standing 120/70 does have some orthostatic component we did the following  We cut down the isosorbide mononitrate to 30 mg daily from 60  We asked him to take the Flomax at night 0 4 mg  We asked him not to take the Lasix on a daily basis unless he develops edema    3  Diabetes is has not been checking his blood sugars he needs to check his blood sugars before meals and at bedtime to make sure he does not have hypoglycemia  As the reason for lightheadedness and near-syncope    I recommended for him not to drive until the symptoms had resolved the granddaughter is here with him I will drive him    4  Bladder cancer undergoing radiation therapy may be the reason why he has this pain he is going to see the urologist soon Dr Ana Petty was kind enough to give him an appointment at 1:15 a m  Today I really appreciate that    Coronary artery disease seems to be stable with all the stress he denies any angina    There is no evidence of heart failure    He has chronic renal failure stage 4 is euvolemic        No problem-specific Assessment & Plan notes found for this encounter         Diagnoses and all orders for this visit:    Type 2 diabetes mellitus with stage 3 chronic kidney disease, with long-term current use of insulin, unspecified whether stage 3a or 3b CKD ([place] [address] )    Secondary renal hyperparathyroidism ([place] [address] )    Coronary artery disease of native artery of native heart with stable angina pectoris ([place] [address] )    Hypertension with renal disease    Arteriosclerosis of artery of extremity ([place] [address] )    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    Carcinoma in situ of bladder    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)    CKD (chronic kidney disease) stage 4, GFR 15-29 ml/min ([facility])    Moderate major depression, single episode ([place] [address] )    Mixed hyperlipidemia          Subjective:      Patient ID: Carlitos Cruz is a 76 y o  male  No chief complaint on file          Current Outpatient Medications:     acetaminophen (TYLENOL) 325 mg tablet, Take 650 mg by mouth every 6 (six) hours as needed for mild pain, Disp: , Rfl:     ALPRAZolam (XANAX) 0 25 mg tablet, At twice a day as needed for anxiety, Disp: 30 tablet, Rfl: 0    aspirin (ECOTRIN LOW STRENGTH) 81 mg EC tablet, Take 1 tablet (81 mg total) by mouth daily, Disp: , Rfl:     Azelastine HCl 0 15 % SOLN, Inhale 1 spray 2 (two) times a day, Disp: 30 mL, Rfl: 3    Bimatoprost (LUMIGAN OP), Apply 1 drop to eye daily at bedtime , Disp: , Rfl:     calcitriol (ROCALTROL) 0 25 mcg capsule, Take 1 capsule (0 25 mcg total) by mouth daily, Disp: 90 capsule, Rfl: 0    docusate sodium (COLACE) 100 mg capsule, Take 1 capsule (100 mg total) by mouth 2 (two) times a day as needed for constipation, Disp: 100 capsule, Rfl: 0    DULoxetine (CYMBALTA) 20 mg capsule, Take 1 capsule (20 mg total) by mouth daily, Disp: 90 capsule, Rfl: 0    ezetimibe (ZETIA) 10 mg tablet, Take 1 tablet (10 mg total) by mouth daily in the early morning, Disp: 90 tablet, Rfl: 0    Ferrous Sulfate Dried (Feosol) 200 (65 Fe) MG TABS, Take 65 mg by mouth daily, Disp: 90 tablet, Rfl: 0    fluocinonide (LIDEX) 0 05 % cream, Apply topically 2 (two) times a day (Patient taking differently: Apply topically as needed ), Disp: 30 g, Rfl: 0    fluorouracil 5,975 mg in CADD infusion pump, Infuse 5,975 mg (500 mg/m2/day x 2 39 m2) into a venous catheter over 120 hours for 5 days  Do not start before February 21, 2022 , Disp: 1 each, Rfl: 0    fluticasone (FLONASE) 50 mcg/act nasal spray, 1 spray into each nostril daily (Patient taking differently: 1 spray into each nostril as needed ), Disp: 11 mL, Rfl: 1    furosemide (LASIX) 20 mg tablet, TAKE 1 TABLET BY MOUTH AS NEEDED FOR EDEMA, Disp: 90 tablet, Rfl: 1    GABAPENTIN PO, Take 200 mg by mouth, Disp: , Rfl:     HYDROcodone-acetaminophen (NORCO) 5-325 mg per tablet, 1-2 tab every 6 hr if needed for pain, Disp: 30 tablet, Rfl: 0    Insulin Pen Needle 31G X 8 MM MISC, Inject 3 times a day, Disp: 100 each, Rfl: 2    isosorbide mononitrate (IMDUR) 30 mg 24 hr tablet, Take 1 tablet (30 mg total) by mouth daily in the early morning, Disp: 90 tablet, Rfl: 0    Lantus SoloStar 100 units/mL injection pen, 18 units qhs (Patient taking differently: Inject 18 Units under the skin daily at bedtime ), Disp: 30 mL, Rfl: 1    levofloxacin (LEVAQUIN) 500 mg tablet, Take 1 tablet (500 mg total) by mouth every 24 hours for 7 days, Disp: 7 tablet, Rfl: 0    lidocaine (XYLOCAINE) 2 % topical gel, Apply topically as needed for mild pain, Disp: 30 mL, Rfl: 0    lidocaine-prilocaine (EMLA) cream, Apply topically as needed for mild pain, Disp: 30 g, Rfl: 0    loperamide (IMODIUM) 2 mg capsule, Take 2 mg by mouth 4 (four) times a day as needed for diarrhea, Disp: , Rfl:     metoprolol succinate (TOPROL-XL) 100 mg 24 hr tablet, Take 1 tablet (100 mg total) by mouth daily for 7 days, Disp: 90 tablet, Rfl: 0    multivitamin (THERAGRAN) TABS, Take 1 tablet by mouth daily  , Disp: , Rfl:     naloxone (NARCAN) 4 mg/0 1 mL nasal spray, Administer 1 spray into a nostril   If no response after 2-3 minutes, give another dose in the other nostril using a new spray , Disp: 1 each, Rfl: 1    NovoLOG FlexPen 100 units/mL injection pen, 10 units with each meal  (Patient taking differently: Inject 10 Units under the skin 3 (three) times a day with meals ), Disp: 5 pen, Rfl: 1    omeprazole (PriLOSEC) 20 mg delayed release capsule, Take 20 mg by mouth daily in the early morning  , Disp: , Rfl:     oxybutynin (DITROPAN) 5 mg tablet, Take 1 tablet (5 mg total) by mouth 3 (three) times a day, Disp: 15 tablet, Rfl: 0    oxyCODONE-acetaminophen (Percocet) 5-325 mg per tablet, Take 1 tablet by mouth every 6 (six) hours as needed for moderate pain Max Daily Amount: 4 tablets, Disp: 20 tablet, Rfl: 0    sertraline (ZOLOFT) 50 mg tablet, Take 1 tablet (50 mg total) by mouth daily, Disp: 90 tablet, Rfl: 0    tamsulosin (FLOMAX) 0 4 mg, Take 1 capsule (0 4 mg total) by mouth daily at bedtime, Disp: 90 capsule, Rfl: 0    HPI    The following portions of the patient's history were reviewed and updated as appropriate: allergies, current medications, past family history, past medical history, past social history, past surgical history and problem list     Review of Systems   Constitutional: Positive for fatigue  Negative for activity change, appetite change, fever and unexpected weight change  HENT: Negative for congestion, ear pain, hearing loss, mouth sores, postnasal drip, rhinorrhea, sore throat, trouble swallowing and voice change  Eyes: Negative for pain, redness and visual disturbance  Respiratory: Negative for cough, chest tightness, shortness of breath and wheezing  Cardiovascular: Negative for chest pain, palpitations and leg swelling  Gastrointestinal: Negative for abdominal distention, abdominal pain, blood in stool, constipation, diarrhea and nausea  Endocrine: Negative for cold intolerance, heat intolerance, polydipsia, polyphagia and polyuria  Genitourinary: Positive for dysuria and penile pain  Negative for difficulty urinating, flank pain, frequency, hematuria, scrotal swelling and urgency  Musculoskeletal: Negative for arthralgias, back pain, gait problem, joint swelling and myalgias  Skin: Negative for color change and pallor  Neurological: Negative for dizziness, tremors, seizures, syncope, weakness, numbness and headaches  Hematological: Negative for adenopathy  Does not bruise/bleed easily  Psychiatric/Behavioral: Negative  Negative for sleep disturbance  The patient is not nervous/anxious  Objective: There were no vitals taken for this visit  Physical Exam  Vitals reviewed  Constitutional:       Appearance: He is well-developed  HENT:      Head: Normocephalic  Right Ear: External ear normal       Left Ear: External ear normal       Nose: Nose normal       Mouth/Throat:      Pharynx: No oropharyngeal exudate  Eyes:      Conjunctiva/sclera: Conjunctivae normal       Pupils: Pupils are equal, round, and reactive to light  Neck:      Thyroid: No thyromegaly  Cardiovascular:      Rate and Rhythm: Normal rate and regular rhythm  Heart sounds: Normal heart sounds  No murmur heard  No friction rub  No gallop  Comments: S1-S2 regular rhythm  Extremities no edema  Pulmonary:      Effort: Pulmonary effort is normal  No respiratory distress  Breath sounds: Normal breath sounds  No wheezing or rales  Comments: Lungs are clear no wheezing rales or rhonchi  Abdominal:      General: Bowel sounds are normal  There is no distension  Palpations: Abdomen is soft  There is no mass  Tenderness: There is no abdominal tenderness  There is no guarding or rebound  Comments: Abdomen soft nontender  Suprapubic tenderness  No CVA tender   Genitourinary:     Penis: Normal        Comments: Rectal exam note stool to test no blood tenderness on palpation  Musculoskeletal:         General: Normal range of motion        Cervical back: Normal range of motion and neck supple  Lymphadenopathy:      Cervical: No cervical adenopathy  Skin:     General: Skin is warm and dry  Neurological:      Mental Status: He is alert and oriented to person, place, and time     Psychiatric:         Behavior: Behavior normal          Judgment: Judgment normal

## 2022-02-23 NOTE — PATIENT INSTRUCTIONS
Gabapentin 300 mg at bedtime  The Flomax please take it at bedtime  Isosorbide mononitrate only 30 mg daily in the morning  Furosemide Lasix 20 mg as needed for edema  Check her blood sugars before breakfast lunch and dinner and before you go to bed and record them so the endocrinologist can review it and I can review it  Come back in the office in 1 week  Will try to see the urologist can see you this week

## 2022-02-23 NOTE — PROGRESS NOTES
2/23/2022      Chief Complaint   Patient presents with    Groin Pain         Assessment and Plan    76 y o  male managed by Dr Juju Rosenbaum     1  Urethral and rectal pain   · Urine today: small leukocytes, large blood  · Previous urine culture 2/17: [id number] Enterococcus faecalis  Currently on Levaquin since 2/20  · PVR: 7 mL   · Discussed with Dr Juju Rosenbaum, given severe pain in office with history of syncope, recommend patient present to [facility] ED for hospital admission for IVF and pain control  · EMS called for transportation due to patient's severe pain and inability to transport himself  History of Present Illness  Nava Manule is a 76 y o  male here as problem visit for evaluation of rectal and penile pain  Patient has history of recurrent, BCG refractory, Stage II, [id number], 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  And is most recently s/p TURBT on 10/18/2021  Patient developed gross hematuria following his procedure and is s/p cystoscopy with clot evacuation and right ureteral stent insertion on 10/24/2021  The right ureteral stent was left in place bowl to protect the kidney and to allow for potential instillation therapies by way of the bladder in the future if deemed appropriate based on final pathology  His Shirley catheter was subsequently removed on 10/29/2021  Was evaluated by Dr Juju Rosenbaum in the office on 11/04/2021 discuss treatment options of his MIBC  Case was discussed at our  multidisciplinary tumor board conference which recommended chemotherapy or immune therapy by Medical Oncology likely followed by external beam radiation  He was to have cystoscopy Dr Juju Rosenbaum on 02/09/2022 the patient had near syncopal episode in the office and it was determined that we should defer cystoscopy evaluation until 4-6 weeks after he finishes his XRT  He was also evaluated by urologic oncology with [facility] on 02/18/2022 to further discuss treatment options including radical cystectomy    Given his multiple cor morbidities including renal failure and PVD as well as cardiac history with 7 stents, it was determined that surgery is not currently the best option given high-risk nature  He is to return in 8-10 weeks for office cystoscopy with a NCCT C/A/P prior  He was evaluated by his PCP this morning where he reported "spasmodic penis pain and rectal pain " PCP did not find any abnormalities on physical exam   Patient is taking gabapentin 300 mg at bedtime but his pain remains severe  He states the pain has been ongoing for the past 2 weeks  He describes it as urethral pain that radiates to his rectum  He states it is a gnawing, 9/10 pain that occurs approximately every 15 minutes  These episodes are accompanied with the urge to void  He states he is only passing small volumes with each void  He confirms sensation of incomplete bladder emptying  Patient did have an episode of pain in the office which did appear severe  He states his last bowel movement was earlier this afternoon and denies issues with constipation  He denies any fevers or chills at home  Review of Systems   Constitutional: Negative for chills and fever  HENT: Negative  Respiratory: Negative  Cardiovascular: Negative  Gastrointestinal: Negative for abdominal pain, constipation, nausea and vomiting  Genitourinary: Positive for frequency, hematuria (started yesterday), penile pain and urgency  Negative for difficulty urinating, dysuria and flank pain  Denies incontinence  Does confirm sensation of incomplete bladder emptying  Neurological: Negative for dizziness and light-headedness  Vitals  Vitals:    02/23/22 1324   BP: 120/66   Pulse: 84   Weight: 104 kg (230 lb)   Height: 6' 0 5" (1 842 m)       Physical Exam  Vitals reviewed  Constitutional:       General: He is in acute distress  Appearance: He is obese  He is not ill-appearing, toxic-appearing or diaphoretic     HENT:      Head: Normocephalic and atraumatic  Eyes:      Conjunctiva/sclera: Conjunctivae normal    Pulmonary:      Effort: Pulmonary effort is normal  No respiratory distress  Abdominal:      General: There is no distension  Palpations: Abdomen is soft  Tenderness: There is no abdominal tenderness  There is no right CVA tenderness, left CVA tenderness, guarding or rebound  Musculoskeletal:         General: Normal range of motion  Cervical back: Normal range of motion  Skin:     General: Skin is warm and dry  Neurological:      General: No focal deficit present  Mental Status: He is alert and oriented to person, place, and time  Psychiatric:         Mood and Affect: Mood normal          Behavior: Behavior normal          Thought Content:  Thought content normal          Judgment: Judgment normal        Past History  Past Medical History:   Diagnosis Date    Anemia     Last assessed: 9/28/17    Anxiety     Arteriosclerotic cardiovascular disease     Last assessed: 9/28/17    Arthritis     [facility]     bladder- had BCG treatments    Chronic kidney disease     Stage IV    CKD (chronic kidney disease) stage 4, GFR 15-29 ml/min ([facility]     Colon polyp     Coronary artery disease     7 stents    Depression     Diabetes mellitus ([facility])     IDDM    GERD (gastroesophageal reflux disease)     Glaucoma     Hematuria     History of fusion of cervical spine     Hyperlipidemia     Hypertension     Insomnia     Last assessed: 11/14/12    Loss of hearing     has hearing aids but usually does not wear them    Other seasonal allergic rhinitis     Last assessed: 2/10/16    PAD (peripheral artery disease) ([facility])     Shortness of breath     on exertion    Spinal stenosis of lumbar region     Transient cerebral ischemia     No Residual    Uses walker     w/c for longer distances     Social History     Socioeconomic History    Marital status: /Civil Union     Spouse name: None    Number of children: None    Years of education: None    Highest education level: None   Occupational History    None   Tobacco Use    Smoking status: Former Smoker     Packs/day: 3 00     Years: 27 00     Pack years: 81 00     Types: Cigarettes     Quit date: 1970     Years since quittin 1    Smokeless tobacco: Never Used   Vaping Use    Vaping Use: Never used   Substance and Sexual Activity    Alcohol use: Not Currently     Comment: beer / liquor    Drug use: Not Currently     Types: Marijuana     Comment: quit 2019 had medical marijuana    Sexual activity: Not Currently   Other Topics Concern    None   Social History Narrative    Consumes 1 cup of coffee and 1 soda per day     Social Determinants of Health     Financial Resource Strain: Not on file   Food Insecurity: Not on file   Transportation Needs: Not on file   Physical Activity: Not on file   Stress: Not on file   Social Connections: Not on file   Intimate Partner Violence: Not on file   Housing Stability: Not on file     Social History     Tobacco Use   Smoking Status Former Smoker    Packs/day: 3 00    Years: 27 00    Pack years: 81 00    Types: Cigarettes    Quit date: 5    Years since quittin 1   Smokeless Tobacco Never Used     Family History   Problem Relation Age of Onset    Diabetes Mother     Heart disease Mother     Other Mother         High blood pressure    Heart disease Father     Diabetes Sister     Other Sister         High blood pressure    Kidney disease Sister     Heart disease Brother     Other Brother         High blood pressure       The following portions of the patient's history were reviewed and updated as appropriate: allergies, current medications, past medical history, past social history, past surgical history and problem list     Results  Recent Results (from the past 1 hour(s))   POCT Measure PVR    Collection Time: 22  1:31 PM   Result Value Ref Range    POST-VOID RESIDUAL VOLUME, ML POC 7 mL   POCT urine dip auto non-scope    Collection Time: 02/23/22  1:36 PM   Result Value Ref Range     COLOR,UA yellow     CLARITY,UA clear     SPECIFIC GRAVITY,UA 1 025      PH,UA 6 5     LEUKOCYTE ESTERASE,UA sm     NITRITE,UA n     GLUCOSE, UA n     KETONES,UA n     BILIRUBIN,UA n     BLOOD,UA lg     POCT URINE PROTEIN 300     SL AMB POCT UROBILINOGEN 0 2    ]  No results found for: PSA  Lab Results   Component Value Date    GLUCOSE 203 (H) 09/03/2015    CALCIUM 9 0 02/04/2022     09/03/2015    K 4 8 02/04/2022    CO2 25 02/04/2022     02/04/2022    BUN 48 (H) 02/04/2022    CREATININE 3 14 (H) 02/04/2022     Lab Results   Component Value Date    WBC 3 64 (L) 02/04/2022    HGB 10 3 (L) 02/04/2022    HCT 31 4 (L) 02/04/2022    MCV 92 02/04/2022     (L) 02/04/2022     Cheryl West PA-C

## 2022-02-23 NOTE — ED PROVIDER NOTES
History  Chief Complaint   Patient presents with    Bladder Problem     pt c/o bladder and rectal pain for the past x2 weeks  pt has bladder cancer and is recieving chemotherapy  pt was at uriology office and was told to transport to the ED  pt denies cp/sobnvd or fevers     75 yo M h/o bladder cancer currently undergoing radiation therapy, CAD s/p 7 stents presenting for evaluation of 2 weeks of suprapubic, penis and rectal pain  Intermittent and severe, no a/e factors  Reports urinary frequency and only urinating a small amount, reports slight blood in urine without clots  Being treated for UTI from Urine cx on 2/17, has been on Levaquin since 2/20, has not taken his dose today  Reports pain with defecation and mucous in stools, denies noticing any dark stools/blood in stools  Reports pain to penis, but denies scrotal pain/swelling or perineal pain  Denies fevers, chills, CP, SOB, N/V  Saw PCP today followed by Urology  Urology sent pt to ER and recommended admission for IVF/pain control  PVR 7mL in office today  Urine culture 2/17: 70K-79K Enterococcus faecalis  Radiation therapy daily for weeks, last was on Friday (5 days ago) because he was not feeling well to have it this week    Plan: Pain control, sepsis workup, CT to assess for abscess/stent complication/proctitis, etc                 Prior to Admission Medications   Prescriptions Last Dose Informant Patient Reported? Taking?    ALPRAZolam (XANAX) 0 25 mg tablet  Self No No   Sig: At twice a day as needed for anxiety   Accu-Chek Softclix Lancets lancets   No No   Sig: Test blood sugar 4 times daily   Azelastine HCl 0 15 % SOLN  Self No No   Sig: Inhale 1 spray 2 (two) times a day   Bimatoprost (LUMIGAN OP)  Self Yes No   Sig: Apply 1 drop to eye daily at bedtime    DULoxetine (CYMBALTA) 20 mg capsule   No No   Sig: Take 1 capsule (20 mg total) by mouth daily   Ferrous Sulfate Dried (Feosol) 200 (65 Fe) MG TABS   No No   Sig: Take 65 mg by mouth daily HYDROcodone-acetaminophen (NORCO) 5-325 mg per tablet  Self No No   Si-2 tab every 6 hr if needed for pain   Insulin Pen Needle 31G X 8 MM MISC  Self No No   Sig: Inject 3 times a day   Lantus SoloStar 100 units/mL injection pen  Self No No   Si units qhs   Patient taking differently: Inject 18 Units under the skin daily at bedtime    NovoLOG FlexPen 100 units/mL injection pen  Self No No   Sig: 10 units with each meal    Patient taking differently: Inject 10 Units under the skin 3 (three) times a day with meals    acetaminophen (TYLENOL) 325 mg tablet  Self Yes No   Sig: Take 650 mg by mouth every 6 (six) hours as needed for mild pain   aspirin (ECOTRIN LOW STRENGTH) 81 mg EC tablet  Self No No   Sig: Take 1 tablet (81 mg total) by mouth daily   calcitriol (ROCALTROL) 0 25 mcg capsule   No No   Sig: Take 1 capsule (0 25 mcg total) by mouth daily   docusate sodium (COLACE) 100 mg capsule   No No   Sig: Take 1 capsule (100 mg total) by mouth 2 (two) times a day as needed for constipation   ezetimibe (ZETIA) 10 mg tablet   No No   Sig: Take 1 tablet (10 mg total) by mouth daily in the early morning   fluocinonide (LIDEX) 0 05 % cream  Self No No   Sig: Apply topically 2 (two) times a day   Patient taking differently: Apply topically as needed    fluorouracil 5,975 mg in CADD infusion pump   No No   Sig: Infuse 5,975 mg (500 mg/m2/day x 2 39 m2) into a venous catheter over 120 hours for 5 days  Do not start before 2022     fluticasone (FLONASE) 50 mcg/act nasal spray  Self No No   Si spray into each nostril daily   Patient taking differently: 1 spray into each nostril as needed    furosemide (LASIX) 20 mg tablet  Self No No   Sig: TAKE 1 TABLET BY MOUTH AS NEEDED FOR EDEMA   Patient not taking: Reported on 2022   gabapentin (Neurontin) 300 mg capsule   No No   Sig: Take 1 capsule (300 mg total) by mouth daily at bedtime   glucose blood (Accu-Chek Martha Plus) test strip   No No   Sig: Test blood sugar 4 times daily   isosorbide mononitrate (IMDUR) 30 mg 24 hr tablet   No No   Sig: Take 1 tablet (30 mg total) by mouth daily in the early morning   levofloxacin (LEVAQUIN) 500 mg tablet   No No   Sig: Take 1 tablet (500 mg total) by mouth every 24 hours for 7 days   lidocaine (XYLOCAINE) 2 % topical gel   No No   Sig: Apply topically as needed for mild pain   lidocaine-prilocaine (EMLA) cream  Self No No   Sig: Apply topically as needed for mild pain   loperamide (IMODIUM) 2 mg capsule  Self Yes No   Sig: Take 2 mg by mouth 4 (four) times a day as needed for diarrhea   metoprolol succinate (TOPROL-XL) 100 mg 24 hr tablet   No No   Sig: Take 1 tablet (100 mg total) by mouth daily for 7 days   multivitamin (THERAGRAN) TABS  Self Yes No   Sig: Take 1 tablet by mouth daily  naloxone (NARCAN) 4 mg/0 1 mL nasal spray  Self No No   Sig: Administer 1 spray into a nostril  If no response after 2-3 minutes, give another dose in the other nostril using a new spray     Patient not taking: Reported on 2/23/2022    omeprazole (PriLOSEC) 20 mg delayed release capsule  Self Yes No   Sig: Take 20 mg by mouth daily in the early morning     oxyCODONE-acetaminophen (Percocet) 5-325 mg per tablet   No No   Sig: Take 1 tablet by mouth every 6 (six) hours as needed for moderate pain Max Daily Amount: 4 tablets   oxybutynin (DITROPAN) 5 mg tablet   No No   Sig: Take 1 tablet (5 mg total) by mouth 3 (three) times a day   sertraline (ZOLOFT) 50 mg tablet   No No   Sig: Take 1 tablet (50 mg total) by mouth daily   tamsulosin (FLOMAX) 0 4 mg   No No   Sig: Take 1 capsule (0 4 mg total) by mouth daily at bedtime      Facility-Administered Medications: None       Past Medical History:   Diagnosis Date    Anemia     Last assessed: 9/28/17    Anxiety     Arteriosclerotic cardiovascular disease     Last assessed: 9/28/17    Arthritis     Bladder cancer ([facility] [address] )     bladder- had BCG treatments    Chronic kidney disease     Stage IV    CKD (chronic kidney disease) stage 4, GFR 15-29 ml/min ([facility])     Colon polyp     Coronary artery disease     7 stents    Depression     Diabetes mellitus ([facility])     IDDM    GERD (gastroesophageal reflux disease)     Glaucoma     Hematuria     History of fusion of cervical spine     Hyperlipidemia     Hypertension     Insomnia     Last assessed: 11/14/12    Loss of hearing     has hearing aids but usually does not wear them    Other seasonal allergic rhinitis     Last assessed: 2/10/16    PAD (peripheral artery disease) ([facility])     Shortness of breath     on exertion    Spinal stenosis of lumbar region     Transient cerebral ischemia     No Residual    Uses walker     w/c for longer distances       Past Surgical History:   Procedure Laterality Date    CARDIAC SURGERY      Cath stent placement  Last assessed: 3/9/17  Interventional Catheterization    CHOLECYSTECTOMY      COLONOSCOPY      CYSTOSCOPY      Diagnostic w/biopsy  Deloise Pheasant  Last assessed: 12/1/14    CYSTOSCOPY W/ URETERAL STENT PLACEMENT Bilateral 10/18/2021    Procedure: bilateral retrogrades, cytology collection;  Surgeon: Lynn Yepez MD;  [facility];  Service: Urology    CYSTOURETHROSCOPY      w/cautery  Cadence Pheasant    FL RETROGRADE PYELOGRAM  10/18/2021    FL RETROGRADE PYELOGRAM  10/24/2021    GASTRIC BYPASS      For morbid obesity w/Shaji-en-Y   Resolved: 11/17/09    INCISION AND DRAINAGE OF WOUND Right 2/26/2017    Procedure: INCISION AND DRAINAGE (I&D) EXTREMITY WITH APPLICATION OF GRAFT JACKET;  Surgeon: Osman Byrne DPM;  [facility];  Service:     INCISION AND DRAINAGE OF WOUND Right 4/25/2017    Procedure: INCISION AND DRAINAGE (I&D) EXTREMITY, APPLICATION OF GRAFT;  Surgeon: Osman Byrne DPM;  [facility];  Service:     IR BIOPSY OTHER  7/2/2020    IR LOWER EXTREMITY ANGIOGRAM  2/8/2021    IR LOWER EXTREMITY ANGIOGRAM  2/11/2021    IR PORT PLACEMENT 1/17/2022    IR TUNNELED CENTRAL LINE PLACEMENT  12/24/2020    JOINT REPLACEMENT      christofer knees replaced    OR AMPUTATION METATARSAL+TOE,SINGLE Left 12/21/2020    Procedure: RAY RESECTION FOOT;  Surgeon: Hubert Moulton DPM;  [facility];  Service: Podiatry    OR AMPUTATION METATARSAL+TOE,SINGLE Left 12/31/2020    Procedure: 5TH MET RESECTION;  Surgeon: Hubert Moulton DPM;  [facility];  Service: Podiatry    OR CYSTOURETHROSCOPY W/IRRIG & EVAC CLOTS N/A 2/10/2021    Procedure: CYSTOSCOPY EVACUATION OF CLOTS, fulguration;  Surgeon: Lana Fernandez MD;  [facility];  Service: Urology    OR CYSTOURETHROSCOPY W/IRRIG & EVAC CLOTS N/A 10/24/2021    Procedure: CYSTOSCOPY EVACUATION OF CLOT, fulguration of bleeding vessels, right ureter stent placement, retrograde pyelogram;  Surgeon: Terri Romero MD;  [facility];  Service: Urology    OR CYSTOURETHROSCOPY,BIOPSY N/A 8/16/2016    Procedure: CYSTOSCOPY WITH BIOPSIES;  Surgeon: Kimber Darling MD;  [facility];  Service: Urology    OR CYSTOURETHROSCOPY,FULGUR <0 5 CM LESN N/A 11/19/2020    Procedure: CYSTO W/BIOPSIES, transurethral prostate bx;  Surgeon: Jennifer Shetty MD;  [facility];  Service: Urology    OR CYSTOURETHROSCOPY,FULGUR >5 CM LESN Bilateral 10/18/2021    Procedure: TRANSURETHRAL RESECTION OF BLADDER TUMOR (TURBT);   Surgeon: Shameka Wing MD;  [facility];  Service: Urology    OR Raúl Brito 3RD+ ORD SLCTV ABDL PEL/LXTR [facility] Left 2/8/2021    Procedure: LEG angiogram, CO2 w/limited contrast with balloon angioplasty postertior tibial artery;  Surgeon: Narayan Jane MD;  [facility];  Service: Vascular    ROTATOR CUFF REPAIR      SMALL INTESTINE SURGERY      Surgery Shaji-en-Y    SPINAL FUSION      lumbar and cervical fusions    VAC DRESSING APPLICATION Right 0/12/9693    Procedure: APPLICATION VAC DRESSING;  Surgeon: George Claros DPM;  [facility];  Service:  WOUND DEBRIDEMENT Left 2021    Procedure: FOOT DEBRIDE, [address] OUT w/graft application;  Surgeon: Alejandra Vail DPM;  [facility];  Service: Podiatry       Family History   Problem Relation Age of Onset    Diabetes Mother     Heart disease Mother     Other Mother         High blood pressure    Heart disease Father     Diabetes Sister     Other Sister         High blood pressure    Kidney disease Sister     Heart disease Brother     Other Brother         High blood pressure     I have reviewed and agree with the history as documented  E-Cigarette/Vaping    E-Cigarette Use Never User      E-Cigarette/Vaping Substances    Nicotine No     THC No     CBD No     Flavoring No     Other No     Unknown No      Social History     Tobacco Use    Smoking status: Former Smoker     Packs/day: 3 00     Years: 27 00     Pack years: 81 00     Types: Cigarettes     Quit date:      Years since quittin     Smokeless tobacco: Never Used   Vaping Use    Vaping Use: Never used   Substance Use Topics    Alcohol use: Not Currently     Comment: beer / liquor    Drug use: Not Currently     Types: Marijuana     Comment: quit 2019 had medical marijuana       Review of Systems   Constitutional: Negative for chills, fever and unexpected weight change  HENT: Negative for ear pain, rhinorrhea and sore throat  Eyes: Negative for pain and visual disturbance  Respiratory: Negative for cough and shortness of breath  Cardiovascular: Negative for chest pain and leg swelling  Gastrointestinal: Positive for rectal pain  Negative for abdominal pain, constipation, diarrhea, nausea and vomiting  Endocrine: Negative for polydipsia, polyphagia and polyuria  Genitourinary: Positive for frequency, hematuria, penile pain and urgency  Negative for dysuria, penile swelling, scrotal swelling and testicular pain  Musculoskeletal: Negative for back pain, myalgias and neck pain     Skin: Negative for color change and rash  Allergic/Immunologic: Negative for environmental allergies and immunocompromised state  Neurological: Negative for dizziness, weakness, light-headedness, numbness and headaches  Hematological: Negative for adenopathy  Does not bruise/bleed easily  Psychiatric/Behavioral: Negative for agitation and confusion  All other systems reviewed and are negative  Physical Exam  Physical Exam  Vitals and nursing note reviewed  Constitutional:       General: He is not in acute distress  Appearance: He is well-developed  HENT:      Head: Normocephalic and atraumatic  Nose: Nose normal    Eyes:      Conjunctiva/sclera: Conjunctivae normal    Cardiovascular:      Rate and Rhythm: Normal rate and regular rhythm  Heart sounds: Normal heart sounds  Pulmonary:      Effort: Pulmonary effort is normal  No respiratory distress  Breath sounds: Normal breath sounds  No stridor  No wheezing or rales  Chest:      Chest wall: No tenderness  Abdominal:      General: There is no distension  Palpations: Abdomen is soft  Tenderness: There is no guarding or rebound  Comments: Mild ttp suprapubic region  Genitourinary:     Penis: Normal        Testes: Normal       Comments:  exam performed with Shari Velazquez RN chaperone  No skin changes, tenderness or swelling to penis  No scrotal swelling/testicular tenderness  No perineal swelling/skin changes or tenderness  Musculoskeletal:         General: No deformity  Cervical back: Normal range of motion and neck supple  Skin:     General: Skin is warm and dry  Findings: No rash  Neurological:      Mental Status: He is alert and oriented to person, place, and time  Motor: No abnormal muscle tone  Coordination: Coordination normal    Psychiatric:         Thought Content:  Thought content normal          Judgment: Judgment normal          Vital Signs  ED Triage Vitals [02/23/22 1454]   Temperature Pulse Respirations Blood Pressure SpO2   99 4 °F (37 4 °C) 83 16 (!) 190/88 100 %      Temp Source Heart Rate Source Patient Position - Orthostatic VS BP Location FiO2 (%)   Oral Monitor Lying Right arm --      Pain Score       6           Vitals:    02/23/22 1454   BP: (!) 190/88   Pulse: 83   Patient Position - Orthostatic VS: Lying         Visual Acuity      ED Medications  Medications   sodium chloride (PF) 0 9 % injection 3 mL (has no administration in time range)   levofloxacin (LEVAQUIN) IVPB (premix in dextrose) 750 mg 150 mL (750 mg Intravenous New Bag 2/23/22 1634)   HYDROmorphone (DILAUDID) injection 0 5 mg (0 5 mg Intravenous Given 2/23/22 1525)   sodium chloride 0 9 % bolus 500 mL (500 mL Intravenous New Bag 2/23/22 1524)       Diagnostic Studies  Results Reviewed     Procedure Component Value Units Date/Time    Urine Microscopic [[id number]]  (Abnormal) Collected: 02/23/22 1543    Lab Status: Final result Specimen: Urine, Clean Catch Updated: 02/23/22 1702     RBC, UA 30-50 /hpf      WBC, UA Innumerable /hpf      Epithelial Cells None Seen /hpf      Bacteria, UA Occasional /hpf     Urine culture [[id number]] Collected: 02/23/22 1543    Lab Status: In process Specimen: Urine, Clean Catch Updated: 02/23/22 1702    Procalcitonin with AM Reflex [[id number]]  (Abnormal) Collected: 02/23/22 1521    Lab Status: Final result Specimen: Blood from Arm, Left Updated: 02/23/22 1627     Procalcitonin 0 53 ng/ml     Procalcitonin Reflex [[id number]]     Lab Status: No result Specimen: Blood     Lactic Acid [[id number]]  (Normal) Collected: 02/23/22 1521    Lab Status: Final result Specimen: Blood from Arm, Left Updated: 02/23/22 1600     LACTIC ACID 1 1 mmol/L     Narrative:      Result may be elevated if tourniquet was used during collection      Comprehensive metabolic panel [[id number]]  (Abnormal) Collected: 02/23/22 1521    Lab Status: Final result Specimen: Blood from Arm, Left Updated: 02/23/22 1558     Sodium 139 mmol/L Potassium 5 1 mmol/L      Chloride 105 mmol/L      CO2 22 mmol/L      ANION GAP 12 mmol/L      BUN 55 mg/dL      Creatinine 4 04 mg/dL      Glucose 157 mg/dL      Calcium 9 1 mg/dL      Corrected Calcium 9 8 mg/dL      AST 75 U/L      ALT 97 U/L      Alkaline Phosphatase 200 U/L      Total Protein 7 5 g/dL      Albumin 3 1 g/dL      Total Bilirubin 0 25 mg/dL      eGFR 13 ml/min/1 73sq m     Narrative:      National Kidney Disease Foundation guidelines for Chronic Kidney Disease (CKD):     Stage 1 with normal or high GFR (GFR > 90 mL/min/1 73 square meters)    Stage 2 Mild CKD (GFR = 60-89 mL/min/1 73 square meters)    Stage 3A Moderate CKD (GFR = 45-59 mL/min/1 73 square meters)    Stage 3B Moderate CKD (GFR = 30-44 mL/min/1 73 square meters)    Stage 4 Severe CKD (GFR = 15-29 mL/min/1 73 square meters)    Stage 5 End Stage CKD (GFR <15 mL/min/1 73 square meters)  Note: GFR calculation is accurate only with a steady state creatinine    UA w Reflex to Microscopic w Reflex to Culture [[id number]]  (Abnormal) Collected: 02/23/22 1543    Lab Status: Final result Specimen: Urine, Clean Catch Updated: 02/23/22 1552     Color, UA Light Yellow     Clarity, UA Cloudy     Specific Gravity, UA 1 025     pH, UA 6 0     Leukocytes, UA Moderate     Nitrite, UA Negative     Protein,  (2+) mg/dl      Glucose, UA Negative mg/dl      Ketones, UA Negative mg/dl      Urobilinogen, UA 0 2 E U /dl      Bilirubin, UA Negative     Blood, UA Large    Protime-INR [[id number]]  (Normal) Collected: 02/23/22 1521    Lab Status: Final result Specimen: Blood from Arm, Left Updated: 02/23/22 1549     Protime 13 6 seconds      INR 1 06    APTT [[id number]]  (Abnormal) Collected: 02/23/22 1521    Lab Status: Final result Specimen: Blood from Arm, Left Updated: 02/23/22 1549     PTT 39 seconds     CBC and differential [[id number]]  (Abnormal) Collected: 02/23/22 1521    Lab Status: Final result Specimen: Blood from Arm, Left Updated: 02/23/22 1530     WBC 7 19 Thousand/uL      RBC 3 26 Million/uL      Hemoglobin 10 4 g/dL      Hematocrit 30 0 %      MCV 92 fL      MCH 31 9 pg      MCHC 34 7 g/dL      RDW 15 1 %      MPV 9 9 fL      Platelets 206 Thousands/uL      nRBC 0 /100 WBCs      Neutrophils Relative 73 %      Immat GRANS % 1 %      Lymphocytes Relative 13 %      Monocytes Relative 11 %      Eosinophils Relative 2 %      Basophils Relative 0 %      Neutrophils Absolute 5 32 Thousands/µL      Immature Grans Absolute 0 04 Thousand/uL      Lymphocytes Absolute 0 90 Thousands/µL      Monocytes Absolute 0 77 Thousand/µL      Eosinophils Absolute 0 13 Thousand/µL      Basophils Absolute 0 03 Thousands/µL     Blood culture #1 [[id number]] Collected: 02/23/22 1521    Lab Status: In process Specimen: Blood from Arm, Right Updated: 02/23/22 1528    Blood culture #2 [[id number]] Collected: 02/23/22 1521    Lab Status: In process Specimen: Blood from Arm, Left Updated: 02/23/22 1528                 CT abdomen pelvis wo contrast   ED Interpretation by DO Nora (02/23 1603)   IMPRESSION:     1  Marked worsening of diffuse bladder wall thickening most likely representing severe cystitis  Cannot exclude progression of tumor  2   Moderate bilateral hydronephrosis  Findings likely related to bladder pathology  Cannot exclude right ureteral stent malfunction  3   Mild wall thickening of the rectum  Cannot exclude proctitis  Final Result by Maria Eugenia Morillo MD (02/23 8909)      1  Marked worsening of diffuse bladder wall thickening most likely representing severe cystitis  Cannot exclude progression of tumor  2   Moderate bilateral hydronephrosis  Findings likely related to bladder pathology  Cannot exclude right ureteral stent malfunction  3   Mild wall thickening of the rectum  Cannot exclude proctitis  The study was marked in [facility] for immediate notification              Workstation performed: [id number] Procedures  Procedures         ED Course  ED Course as of 02/23/22 1741   Wed Feb 23, 2022   1531 Hemoglobin(!): 10 4  baseline   1548 Discussed with urology who sent pt in, agree with scan and IVF and admission  Will see as consult, were considering possible surgery   1603 Creatinine(!): 4 04  3 12 2 weeks ago   1603 1  Marked worsening of diffuse bladder wall thickening most likely representing severe cystitis  Cannot exclude progression of tumor  2  Moderate bilateral hydronephrosis  Findings likely related to bladder pathology  Cannot exclude right ureteral stent malfunction  3  Mild wall thickening of the rectum  Cannot exclude proctitis  MDM  Number of Diagnoses or Management Options  GARY (acute kidney injury) ([facility] [address] )  Bilateral hydronephrosis  Bladder cancer ([facility] [address] )  Cystitis  Proctitis  Diagnosis management comments: 77 yo M with bladder ca s/p ureteral stent and radiation therapy with recently diagnosed UTI outpt, presenting with bladder/rectal pain- CT showing cystitis, b/l hydronephrosis/possible stent migration and possible proctitis  Given pain control, IVF and abx   Discussed with Urology who agree with plan and will see as consult, admitted to [facility]       Amount and/or Complexity of Data Reviewed  Clinical lab tests: ordered and reviewed  Tests in the radiology section of CPT®: ordered and reviewed  Tests in the medicine section of CPT®: ordered and reviewed  Review and summarize past medical records: yes  Independent visualization of images, tracings, or specimens: yes        Disposition  Final diagnoses:   Cystitis   Bilateral hydronephrosis   Proctitis   Bladder cancer ([facility] [address] )   GARY (acute kidney injury) ([facility] [address] )     Time reflects when diagnosis was documented in both MDM as applicable and the Disposition within this note     Time User Action Codes Description Comment    2/23/2022  4:07 PM Thalia Du Add [N30 90] Cystitis     2/23/2022  4:07 PM Nilam Tabor A Add [N13 30] Bilateral hydronephrosis     2/23/2022  4:07 PM Heather Bennett Add [K62 89] Proctitis     2/23/2022  4:07 PM Kassie CLEMENTS Add [C67 9] Bladder cancer ([facility] [address] )     2/23/2022  4:46 PM Kassie CLEMENTS Add [N17 9] GARY (acute kidney injury) [facility])       ED Disposition     ED Disposition Condition Date/Time Comment    Admit Stable Wed Feb 23, 2022  4:07 PM Case was discussed with RAHEL and the patient's admission status was agreed to be Admission Status: inpatient status to the service of Dr Mari Failing    None         Patient's Medications   Discharge Prescriptions    ACCU-CHEK SOFTCLIX LANCETS LANCETS    Test blood sugar 4 times daily       Start Date: 2/23/2022 End Date: --       Order Dose: --       Quantity: 200 each    Refills: 0    GLUCOSE BLOOD (ACCU-CHEK RITA PLUS) TEST STRIP    Test blood sugar 4 times daily       Start Date: 2/23/2022 End Date: --       Order Dose: --       Quantity: 200 strip    Refills: 0       No discharge procedures on file      PDMP Review       Value Time User    PDMP Reviewed  Yes 2/22/2022  1:14 PM Darryn Garcia MD          ED Provider  Electronically Signed by           Cyndi Chung DO  02/23/22 6865

## 2022-02-24 ENCOUNTER — ANESTHESIA EVENT (INPATIENT)
Dept: RADIOLOGY | Facility: HOSPITAL | Age: 77
DRG: 690 | End: 2022-02-24
Payer: MEDICARE

## 2022-02-24 ENCOUNTER — APPOINTMENT (OUTPATIENT)
Dept: RADIATION ONCOLOGY | Facility: HOSPITAL | Age: 77
End: 2022-02-24
Attending: INTERNAL MEDICINE
Payer: MEDICARE

## 2022-02-24 LAB
ALBUMIN SERPL BCP-MCNC: 2.9 G/DL (ref 3.5–5)
ALP SERPL-CCNC: 249 U/L (ref 46–116)
ALT SERPL W P-5'-P-CCNC: 106 U/L (ref 12–78)
ANION GAP SERPL CALCULATED.3IONS-SCNC: 12 MMOL/L (ref 4–13)
AST SERPL W P-5'-P-CCNC: 88 U/L (ref 5–45)
BACTERIA UR CULT: NORMAL
BASOPHILS # BLD AUTO: 0.02 THOUSANDS/ΜL (ref 0–0.1)
BASOPHILS NFR BLD AUTO: 0 % (ref 0–1)
BILIRUB SERPL-MCNC: 0.32 MG/DL (ref 0.2–1)
BUN SERPL-MCNC: 52 MG/DL (ref 5–25)
CALCIUM ALBUM COR SERPL-MCNC: 9.5 MG/DL (ref 8.3–10.1)
CALCIUM SERPL-MCNC: 8.6 MG/DL (ref 8.3–10.1)
CHLORIDE SERPL-SCNC: 105 MMOL/L (ref 100–108)
CO2 SERPL-SCNC: 21 MMOL/L (ref 21–32)
CREAT SERPL-MCNC: 3.87 MG/DL (ref 0.6–1.3)
EOSINOPHIL # BLD AUTO: 0.13 THOUSAND/ΜL (ref 0–0.61)
EOSINOPHIL NFR BLD AUTO: 2 % (ref 0–6)
ERYTHROCYTE [DISTWIDTH] IN BLOOD BY AUTOMATED COUNT: 15.2 % (ref 11.6–15.1)
GFR SERPL CREATININE-BSD FRML MDRD: 13 ML/MIN/1.73SQ M
GLUCOSE SERPL-MCNC: 146 MG/DL (ref 65–140)
GLUCOSE SERPL-MCNC: 170 MG/DL (ref 65–140)
GLUCOSE SERPL-MCNC: 172 MG/DL (ref 65–140)
GLUCOSE SERPL-MCNC: 185 MG/DL (ref 65–140)
GLUCOSE SERPL-MCNC: 210 MG/DL (ref 65–140)
HCT VFR BLD AUTO: 27.9 % (ref 36.5–49.3)
HGB BLD-MCNC: 9.7 G/DL (ref 12–17)
IMM GRANULOCYTES # BLD AUTO: 0.04 THOUSAND/UL (ref 0–0.2)
IMM GRANULOCYTES NFR BLD AUTO: 1 % (ref 0–2)
LYMPHOCYTES # BLD AUTO: 0.9 THOUSANDS/ΜL (ref 0.6–4.47)
LYMPHOCYTES NFR BLD AUTO: 15 % (ref 14–44)
MCH RBC QN AUTO: 32 PG (ref 26.8–34.3)
MCHC RBC AUTO-ENTMCNC: 34.8 G/DL (ref 31.4–37.4)
MCV RBC AUTO: 92 FL (ref 82–98)
MONOCYTES # BLD AUTO: 0.6 THOUSAND/ΜL (ref 0.17–1.22)
MONOCYTES NFR BLD AUTO: 10 % (ref 4–12)
NEUTROPHILS # BLD AUTO: 4.25 THOUSANDS/ΜL (ref 1.85–7.62)
NEUTS SEG NFR BLD AUTO: 72 % (ref 43–75)
NRBC BLD AUTO-RTO: 0 /100 WBCS
PLATELET # BLD AUTO: 192 THOUSANDS/UL (ref 149–390)
PMV BLD AUTO: 10.3 FL (ref 8.9–12.7)
POTASSIUM SERPL-SCNC: 5 MMOL/L (ref 3.5–5.3)
PROT SERPL-MCNC: 7.3 G/DL (ref 6.4–8.2)
RBC # BLD AUTO: 3.03 MILLION/UL (ref 3.88–5.62)
SODIUM SERPL-SCNC: 138 MMOL/L (ref 136–145)
WBC # BLD AUTO: 5.94 THOUSAND/UL (ref 4.31–10.16)

## 2022-02-24 PROCEDURE — 99232 SBSQ HOSP IP/OBS MODERATE 35: CPT | Performed by: HOSPITALIST

## 2022-02-24 PROCEDURE — 85025 COMPLETE CBC W/AUTO DIFF WBC: CPT | Performed by: INTERNAL MEDICINE

## 2022-02-24 PROCEDURE — 82948 REAGENT STRIP/BLOOD GLUCOSE: CPT

## 2022-02-24 PROCEDURE — 99221 1ST HOSP IP/OBS SF/LOW 40: CPT | Performed by: PHYSICIAN ASSISTANT

## 2022-02-24 PROCEDURE — 80053 COMPREHEN METABOLIC PANEL: CPT | Performed by: INTERNAL MEDICINE

## 2022-02-24 RX ORDER — HYDROCODONE BITARTRATE AND ACETAMINOPHEN 5; 325 MG/1; MG/1
1 TABLET ORAL EVERY 6 HOURS PRN
Status: DISCONTINUED | OUTPATIENT
Start: 2022-02-24 | End: 2022-03-02 | Stop reason: HOSPADM

## 2022-02-24 RX ORDER — HYDROMORPHONE HCL/PF 1 MG/ML
1 SYRINGE (ML) INJECTION
Status: DISCONTINUED | OUTPATIENT
Start: 2022-02-24 | End: 2022-03-02 | Stop reason: HOSPADM

## 2022-02-24 RX ORDER — HYDROMORPHONE HCL/PF 1 MG/ML
0.5 SYRINGE (ML) INJECTION ONCE
Status: COMPLETED | OUTPATIENT
Start: 2022-02-24 | End: 2022-02-25

## 2022-02-24 RX ADMIN — ALPRAZOLAM 0.25 MG: 0.25 TABLET ORAL at 21:52

## 2022-02-24 RX ADMIN — HYDROMORPHONE HYDROCHLORIDE 1 MG: 1 INJECTION, SOLUTION INTRAMUSCULAR; INTRAVENOUS; SUBCUTANEOUS at 02:48

## 2022-02-24 RX ADMIN — PANTOPRAZOLE SODIUM 40 MG: 40 TABLET, DELAYED RELEASE ORAL at 05:57

## 2022-02-24 RX ADMIN — HEPARIN SODIUM 5000 UNITS: 5000 INJECTION INTRAVENOUS; SUBCUTANEOUS at 05:57

## 2022-02-24 RX ADMIN — HYDROCODONE BITARTRATE AND ACETAMINOPHEN 1 TABLET: 5; 325 TABLET ORAL at 14:03

## 2022-02-24 RX ADMIN — HYDROCODONE BITARTRATE AND ACETAMINOPHEN 1 TABLET: 5; 325 TABLET ORAL at 20:04

## 2022-02-24 RX ADMIN — DULOXETINE HYDROCHLORIDE 20 MG: 20 CAPSULE, DELAYED RELEASE PELLETS ORAL at 10:42

## 2022-02-24 RX ADMIN — HYDROMORPHONE HYDROCHLORIDE 1 MG: 1 INJECTION, SOLUTION INTRAMUSCULAR; INTRAVENOUS; SUBCUTANEOUS at 14:31

## 2022-02-24 RX ADMIN — OXYBUTYNIN CHLORIDE 5 MG: 5 TABLET ORAL at 17:36

## 2022-02-24 RX ADMIN — GABAPENTIN 300 MG: 300 CAPSULE ORAL at 21:36

## 2022-02-24 RX ADMIN — HEPARIN SODIUM 5000 UNITS: 5000 INJECTION INTRAVENOUS; SUBCUTANEOUS at 21:36

## 2022-02-24 RX ADMIN — OXYBUTYNIN CHLORIDE 5 MG: 5 TABLET ORAL at 20:04

## 2022-02-24 RX ADMIN — CALCITRIOL CAPSULES 0.25 MCG 0.25 MCG: 0.25 CAPSULE ORAL at 08:21

## 2022-02-24 RX ADMIN — OXYBUTYNIN CHLORIDE 5 MG: 5 TABLET ORAL at 08:21

## 2022-02-24 RX ADMIN — INSULIN GLARGINE 18 UNITS: 100 INJECTION, SOLUTION SUBCUTANEOUS at 21:38

## 2022-02-24 RX ADMIN — INSULIN LISPRO 1 UNITS: 100 INJECTION, SOLUTION INTRAVENOUS; SUBCUTANEOUS at 12:11

## 2022-02-24 RX ADMIN — INSULIN LISPRO 1 UNITS: 100 INJECTION, SOLUTION INTRAVENOUS; SUBCUTANEOUS at 08:16

## 2022-02-24 RX ADMIN — HEPARIN SODIUM 5000 UNITS: 5000 INJECTION INTRAVENOUS; SUBCUTANEOUS at 13:14

## 2022-02-24 RX ADMIN — ASPIRIN 81 MG: 81 TABLET, COATED ORAL at 08:20

## 2022-02-24 RX ADMIN — HYDROCODONE BITARTRATE AND ACETAMINOPHEN 1 TABLET: 5; 325 TABLET ORAL at 05:59

## 2022-02-24 RX ADMIN — HYDROMORPHONE HYDROCHLORIDE 1 MG: 1 INJECTION, SOLUTION INTRAMUSCULAR; INTRAVENOUS; SUBCUTANEOUS at 08:18

## 2022-02-24 RX ADMIN — BIMATOPROST 1 DROP: 0.1 SOLUTION/ DROPS OPHTHALMIC at 21:43

## 2022-02-24 RX ADMIN — METOPROLOL SUCCINATE 100 MG: 50 TABLET, EXTENDED RELEASE ORAL at 08:21

## 2022-02-24 RX ADMIN — TAMSULOSIN HYDROCHLORIDE 0.4 MG: 0.4 CAPSULE ORAL at 21:36

## 2022-02-24 RX ADMIN — EZETIMIBE 10 MG: 10 TABLET ORAL at 05:57

## 2022-02-24 RX ADMIN — SERTRALINE HYDROCHLORIDE 50 MG: 50 TABLET ORAL at 08:21

## 2022-02-24 RX ADMIN — HYDROMORPHONE HYDROCHLORIDE 1 MG: 1 INJECTION, SOLUTION INTRAMUSCULAR; INTRAVENOUS; SUBCUTANEOUS at 21:36

## 2022-02-24 RX ADMIN — CEFTRIAXONE SODIUM 1000 MG: 10 INJECTION, POWDER, FOR SOLUTION INTRAVENOUS at 08:20

## 2022-02-24 RX ADMIN — ISOSORBIDE MONONITRATE 30 MG: 30 TABLET, EXTENDED RELEASE ORAL at 05:57

## 2022-02-24 NOTE — H&P
[address] [facility]  H&P- Casey Meraz 1945, 76 y o  male MRN: [id number]  Unit/Bed#: E2 -01 Encounter: [id number]  Primary Care Provider: Junito Mane MD   Date and time admitted to hospital: 2/23/2022  2:50 PM    * UTI (urinary tract infection)  Assessment & Plan  This is a 61-year-old male with history of bladder cancer currently on chemotherapy, CAD, CKD stage 4, diabetes mellitus, hypertension, hyperlipidemia presenting with ongoing rectal and penile pain for the past 2 weeks, difficulty urinating  Patient having dysuria, urinary urgency and frequency also went small amounts of blood in urine without any clots  Has been on Levaquin since 02/20  Was seen by urologist today and sent to the ED for evaluation      · CT revealed worsening of diffuse bladder wall thickening likely representing severe cystitis cannot exclude progression of tumor, moderate bilateral hydronephrosis cannot exclude right ureteral stent malfunction, mild wall thickening of rectum cannot exclude proctitis  · Urine culture 217 revealed Enterococcus faecalis  · Will start on ceftriaxone, follow-up cultures  · Urinary retention protocol  · Pain control, IV fluid  · Urology evaluation will be appreciated  · NPO past midnight for possible procedure by Urology    Acute kidney injury superimposed on CKD [facility])  Assessment & Plan  Lab Results   Component Value Date    EGFR 13 02/23/2022    EGFR 17 02/04/2022    EGFR 15 01/25/2022    CREATININE 4 04 (H) 02/23/2022    CREATININE 3 14 (H) 02/04/2022    CREATININE 3 57 (H) 01/25/2022   · Acute kidney injury on CKD stage 4  · Baseline creatinine in the mid 2s although more recently has been 3 2-3 6  · Follows with [facility] at [address] [place] [address] [address]  · Now with acute kidney injury likely secondary to postobstructive uropathy and UTI  · Will trial gentle IV fluids  · PVR, urinary retention protocol  · Monitor renal function    Type 2 diabetes mellitus, with long-term current use of insulin ([facility] [address] )  Assessment & Plan  Lab Results   Component Value Date    HGBA1C 7 7 (H) 09/03/2021       No results for input(s): POCGLU in the last 72 hours  Blood Sugar Average: Last 72 hrs:  ·  continue Lantus 18 units HS, will hold with insulin aspart as will be NPO  · Monitor Accu-Cheks, sliding scale for coverage    Hyperlipidemia  Assessment & Plan  · Continue Zetia    Hypertension with renal disease  Assessment & Plan  · Continue metoprolol 100 mg daily    Carcinoma in situ of bladder  Assessment & Plan  · Patient has history of recurrent urothelial carcinoma bladder  · Status post recent TURBT 10/18/21  · Patient has ongoing rectal and penile pain  · Currently on radiation  · Urology input will be appreciated    Coronary artery disease of native artery of native heart with stable angina pectoris (HCC)  Assessment & Plan  · Continue aspirin, beta-blocker  · Denies any chest pain          VTE Prophylaxis: Heparin  / sequential compression device   Code Status: Full  POLST: There is no POLST form on file for this patient (pre-hospital)    Anticipated Length of Stay:  Patient will be admitted on an Inpatient basis with an anticipated length of stay of  Greater than 2 midnights  Justification for Hospital Stay: UTI    Total Time for Visit, including Counseling / Coordination of Care: 45 minutes  Greater than 50% of this total time spent on direct patient counseling and coordination of care  Chief Complaint:   Penile and rectal pain, difficulty urinating    History of Present Illness:    Carlitos Cruz is a 76 y o  male who presents with penile and rectal pain  Patient hashistory of bladder cancer currently on chemotherapy, CAD, CKD stage 4, diabetes mellitus, hypertension, hyperlipidemia presenting with ongoing rectal and penile pain for the past 2 weeks, difficulty urinating  Patient having dysuria, urinary urgency and frequency also went small amounts of blood in urine without any clots    Has been on Levaquin since 02/20  Was seen by urologist today and sent to the ED for evaluation  Patient denies chest pain, wheeze, dyspnea  Denies any fever, chills  Review of Systems:    Review of Systems   Constitutional: Negative  HENT: Negative  Eyes: Negative  Respiratory: Negative  Cardiovascular: Negative  Gastrointestinal: Negative  Genitourinary: Positive for decreased urine volume, difficulty urinating, dysuria, penile pain and urgency  Musculoskeletal: Negative  Skin: Negative  Allergic/Immunologic: Negative  Neurological: Negative  Hematological: Negative  Psychiatric/Behavioral: Negative  Past Medical and Surgical History:     Past Medical History:   Diagnosis Date    Anemia     Last assessed: 9/28/17    Anxiety     Arteriosclerotic cardiovascular disease     Last assessed: 9/28/17    Arthritis     Bladder cancer ([facility] [address] )     bladder- had BCG treatments    Chronic kidney disease     Stage IV    CKD (chronic kidney disease) stage 4, GFR 15-29 ml/min ([facility])     Colon polyp     Coronary artery disease     7 stents    Depression     Diabetes mellitus ([facility])     IDDM    GERD (gastroesophageal reflux disease)     Glaucoma     Hematuria     History of fusion of cervical spine     Hyperlipidemia     Hypertension     Insomnia     Last assessed: 11/14/12    Loss of hearing     has hearing aids but usually does not wear them    Other seasonal allergic rhinitis     Last assessed: 2/10/16    PAD (peripheral artery disease) ([facility])     Shortness of breath     on exertion    Spinal stenosis of lumbar region     Transient cerebral ischemia     No Residual    Uses walker     w/c for longer distances       Past Surgical History:   Procedure Laterality Date    CARDIAC SURGERY      Cath stent placement  Last assessed: 3/9/17  Interventional Catheterization    CHOLECYSTECTOMY      COLONOSCOPY      CYSTOSCOPY      Diagnostic w/biopsy  Ammy Jalen   Last assessed: 12/1/14    CYSTOSCOPY W/ URETERAL STENT PLACEMENT Bilateral 10/18/2021    Procedure: bilateral retrogrades, cytology collection;  Surgeon: India Tee MD;  [facility];  Service: Urology    CYSTOURETHROSCOPY      w/cautery  Jazmin Grajeda    FL RETROGRADE PYELOGRAM  10/18/2021    FL RETROGRADE PYELOGRAM  10/24/2021    GASTRIC BYPASS      For morbid obesity w/Shaji-en-Y   Resolved: 11/17/09    INCISION AND DRAINAGE OF WOUND Right 2/26/2017    Procedure: INCISION AND DRAINAGE (I&D) EXTREMITY WITH APPLICATION OF GRAFT JACKET;  Surgeon: Stoney Hall DPM;  [facility];  Service:     INCISION AND DRAINAGE OF WOUND Right 4/25/2017    Procedure: INCISION AND DRAINAGE (I&D) EXTREMITY, APPLICATION OF GRAFT;  Surgeon: Stoney Hall DPM;  [facility];  Service:     IR BIOPSY OTHER  7/2/2020    IR LOWER EXTREMITY ANGIOGRAM  2/8/2021    IR LOWER EXTREMITY ANGIOGRAM  2/11/2021    IR PORT PLACEMENT  1/17/2022    IR TUNNELED CENTRAL LINE PLACEMENT  12/24/2020    JOINT REPLACEMENT      christofer knees replaced    MD AMPUTATION METATARSAL+TOE,SINGLE Left 12/21/2020    Procedure: RAY RESECTION FOOT;  Surgeon: Ricardo Alvarez DPM;  [facility];  Service: Podiatry    MD AMPUTATION METATARSAL+TOE,SINGLE Left 12/31/2020    Procedure: 5TH MET RESECTION;  Surgeon: Ricardo Alvarez DPM;  [facility];  Service: Podiatry    MD CYSTOURETHROSCOPY W/IRRIG & EVAC CLOTS N/A 2/10/2021    Procedure: CYSTOSCOPY EVACUATION OF CLOTS, fulguration;  Surgeon: Mary Gutiérrez MD;  [facility];  Service: Urology    MD CYSTOURETHROSCOPY W/IRRIG & EVAC CLOTS N/A 10/24/2021    Procedure: CYSTOSCOPY EVACUATION OF CLOT, fulguration of bleeding vessels, right ureter stent placement, retrograde pyelogram;  Surgeon: Ruth Miller MD;  [facility];  Service: Urology    MD CYSTOURETHROSCOPY,BIOPSY N/A 8/16/2016    Procedure: CYSTOSCOPY WITH BIOPSIES;  Surgeon: Angel Pollock Niki Yang MD;  [facility];  Service: Urology    AZ CYSTOURETHROSCOPY,FULGUR <0 5 CM LESN N/A 11/19/2020    Procedure: CYSTO W/BIOPSIES, transurethral prostate bx;  Surgeon: Yusuf Lala MD;  [facility];  Service: Urology    AZ CYSTOURETHROSCOPY,FULGUR >5 CM LESN Bilateral 10/18/2021    Procedure: TRANSURETHRAL RESECTION OF BLADDER TUMOR (TURBT); Surgeon: Mabelene Kocher, MD;  [facility];  Service: Urology    AZ [facility] 3RD+ ORD SLCTV ABDL PEL/LXTR [facility] Left 2/8/2021    Procedure: LEG angiogram, CO2 w/limited contrast with balloon angioplasty postertior tibial artery;  Surgeon: Ismael Sullivan MD;  [facility];  Service: Vascular    ROTATOR CUFF REPAIR      SMALL INTESTINE SURGERY      Surgery Shaji-en-Y    SPINAL FUSION      lumbar and cervical fusions    VAC DRESSING APPLICATION Right 2/98/0642    Procedure: APPLICATION VAC DRESSING;  Surgeon: Oralia Majano DPM;  [facility];  Service:     WOUND DEBRIDEMENT Left 2/16/2021    Procedure: FOOT DEBRIDE, [address] OUT w/graft application;  Surgeon: Oralia Majano DPM;  [facility];  Service: Podiatry       Meds/Allergies:    Prior to Admission medications    Medication Sig Start Date End Date Taking?  Authorizing Provider   Accu-Chek Softclix Lancets lancets Test blood sugar 4 times daily 2/23/22   Yusuf Powers MD   acetaminophen (TYLENOL) 325 mg tablet Take 650 mg by mouth every 6 (six) hours as needed for mild pain    Historical Provider, MD   ALPRAZolam [employer]) 0 25 mg tablet At twice a day as needed for anxiety 9/29/21   Yusuf Powers MD   aspirin (ECOTRIN LOW STRENGTH) 81 mg EC tablet Take 1 tablet (81 mg total) by mouth daily 11/1/21   Yusuf Powers MD   Azelastine HCl 0 15 % SOLN Inhale 1 spray 2 (two) times a day 5/14/20   Yusuf Powers MD   Bimatoprost (LUMIGAN OP) Apply 1 drop to eye daily at bedtime     Historical Provider, MD   calcitriol (ROCALTROL) 0 25 mcg capsule Take 1 capsule (0 25 mcg total) by mouth daily 2/2/22   Pedrito Stone MD   docusate sodium (COLACE) 100 mg capsule Take 1 capsule (100 mg total) by mouth 2 (two) times a day as needed for constipation 2/2/22   Pedrito Stone MD   DULoxetine (CYMBALTA) 20 mg capsule Take 1 capsule (20 mg total) by mouth daily 2/2/22   Pedrito Stone MD   ezetimibe (ZETIA) 10 mg tablet Take 1 tablet (10 mg total) by mouth daily in the early morning 2/2/22   Pedrito Stone MD   Ferrous Sulfate Dried (Feosol) 200 (65 Fe) MG TABS Take 65 mg by mouth daily 2/2/22   Pedrito Stone MD   fluocinonide (LIDEX) 0 05 % cream Apply topically 2 (two) times a day  Patient taking differently: Apply topically as needed  5/18/21   Pedrito Stone MD   fluorouracil 5,975 mg in CADD infusion pump Infuse 5,975 mg (500 mg/m2/day x 2 39 m2) into a venous catheter over 120 hours for 5 days  Do not start before February 21, 2022 2/21/22 2/26/22  Cheryl Galeana MD   fluticasone (FLONASE) 50 mcg/act nasal spray 1 spray into each nostril daily  Patient taking differently: 1 spray into each nostril as needed  9/29/21   Gorge Trammell MD   furosemide (LASIX) 20 mg tablet TAKE 1 TABLET BY MOUTH AS NEEDED FOR EDEMA  Patient not taking: Reported on 2/23/2022 11/10/21   Pedrito Stone MD   gabapentin (Neurontin) 300 mg capsule Take 1 capsule (300 mg total) by mouth daily at bedtime 2/23/22 5/24/22  Pedrito Stone MD   glucose blood (Accu-Chek Martha Plus) test strip Test blood sugar 4 times daily 2/23/22   Pedrito Stone MD   HYDROcodone-acetaminophen [facility]) 5-325 mg per tablet 1-2 tab every 6 hr if needed for pain 12/15/21   Lawson Garcia MD   Insulin Pen Needle 31G X 8 MM MISC Inject 3 times a day 5/8/19   Pedrito Stone MD   isosorbide mononitrate (IMDUR) 30 mg 24 hr tablet Take 1 tablet (30 mg total) by mouth daily in the early morning 2/2/22   Pedrito Stone MD   Lantus SoloStar 100 units/mL injection pen 18 units qhs  Patient taking differently: Inject 18 Units under the skin daily at bedtime  6/24/21   Kitty Rubio PA-C   levofloxacin [facility]) 500 mg tablet Take 1 tablet (500 mg total) by mouth every 24 hours for 7 days 2/20/22 2/27/22  EVANGELINA Vásquez   lidocaine (XYLOCAINE) 2 % topical gel Apply topically as needed for mild pain 2/17/22   Rosemary Vivar MD   lidocaine-prilocaine (EMLA) cream Apply topically as needed for mild pain 1/10/22   William Carrillo MD   loperamide (IMODIUM) 2 mg capsule Take 2 mg by mouth 4 (four) times a day as needed for diarrhea    Historical Provider, MD   metoprolol succinate (TOPROL-XL) 100 mg 24 hr tablet Take 1 tablet (100 mg total) by mouth daily for 7 days 12/13/21 2/23/22  Chadwick Zhou MD   multivitamin [facility]) TABS Take 1 tablet by mouth daily  Historical Provider, MD   naloxone (NARCAN) 4 mg/0 1 mL nasal spray Administer 1 spray into a nostril  If no response after 2-3 minutes, give another dose in the other nostril using a new spray    Patient not taking: Reported on 2/23/2022 9/1/21   Chadwick Zhou MD   NovoLOG FlexPen 100 units/mL injection pen 10 units with each meal   Patient taking differently: Inject 10 Units under the skin 3 (three) times a day with meals  2/5/21   Michael Crystal DO   omeprazole (PriLOSEC) 20 mg delayed release capsule Take 20 mg by mouth daily in the early morning      Historical Provider, MD   oxybutynin (DITROPAN) 5 mg tablet Take 1 tablet (5 mg total) by mouth 3 (three) times a day 2/5/22   EVANGELINA Miranda   oxyCODONE-acetaminophen (Percocet) 5-325 mg per tablet Take 1 tablet by mouth every 6 (six) hours as needed for moderate pain Max Daily Amount: 4 tablets 2/22/22   Rosemary Vivar MD   sertraline (ZOLOFT) 50 mg tablet Take 1 tablet (50 mg total) by mouth daily 2/2/22 3/4/22  Chadwick Zhou MD   tamsulosin [facility]) 0 4 mg Take 1 capsule (0 4 mg total) by mouth daily at bedtime 2/2/22 5/3/22  Jacquie Blizzard Shayna Holt MD   betamethasone valerate (VALISONE) 0 1 % cream Apply topically 2 (two) times a day 21  Miguelito Davenport MD   GABAPENTIN PO Take 200 mg by mouth  22  Historical Provider, MD     I have reviewed home medications with patient personally  Allergies:    Allergies   Allergen Reactions    Atorvastatin Hives, Itching and Rash    Simvastatin Rash and Edema     Edema of lower legs    Statins Hives and Itching    Insulin Lispro Swelling and Edema     " Lower Legs"    Other Itching, Rash and Other (See Comments)     "EKG Patches"   "blue EKG patches"       Social History:     Social History     Substance and Sexual Activity   Alcohol Use Not Currently    Comment: beer / liquor     Social History     Tobacco Use   Smoking Status Former Smoker    Packs/day: 3 00    Years: 27 00    Pack years: 81 00    Types: Cigarettes    Quit date: 5    Years since quittin 1   Smokeless Tobacco Never Used     Social History     Substance and Sexual Activity   Drug Use Not Currently    Types: Marijuana    Comment: quit 2019 had medical marijuana       Family History:    Family History   Problem Relation Age of Onset    Diabetes Mother     Heart disease Mother     Other Mother         High blood pressure    Heart disease Father     Diabetes Sister     Other Sister         High blood pressure    Kidney disease Sister     Heart disease Brother     Other Brother         High blood pressure       Physical Exam:     Vitals:   Blood Pressure: (!) 190/88 (22 1454)  Pulse: 83 (22 1454)  Temperature: 99 4 °F (37 4 °C) (22 1454)  Temp Source: Oral (22 1454)  Respirations: 16 (22 1454)  Weight - Scale: 104 kg (229 lb 4 5 oz) (22 1454)  SpO2: 100 % (22 1454)    Constitutional: Patient is oriented to person, place and time, no acute distress  HEENT:  Normocephalic, atraumatic  Cardiovascular: Normal S1S2, RRR, No murmurs/rubs/gallops appreciated  Pulmonary:  Bilateral air entry, No rhonchi/rales/wheezing appreciated  Abdominal: Soft, Bowel sounds present, Non-tender, Non-distended  Extremities:  No cyanosis, clubbing or edema  Neurological: Cranial nerves II-XII grossly intact, sensation intact, otherwise no focal neurological symptoms  Additional Data:     Lab Results: I have personally reviewed pertinent reports  Results from last 7 days   Lab Units 02/23/22  1521   WBC Thousand/uL 7 19   HEMOGLOBIN g/dL 10 4*   HEMATOCRIT % 30 0*   PLATELETS Thousands/uL 196   NEUTROS PCT % 73   LYMPHS PCT % 13*   MONOS PCT % 11   EOS PCT % 2     Results from last 7 days   Lab Units 02/23/22  1521   POTASSIUM mmol/L 5 1   CHLORIDE mmol/L 105   CO2 mmol/L 22   BUN mg/dL 55*   CREATININE mg/dL 4 04*   CALCIUM mg/dL 9 1   ALK PHOS U/L 200*   ALT U/L 97*   AST U/L 75*     Results from last 7 days   Lab Units 02/23/22  1521   INR  1 06       Imaging: I have personally reviewed pertinent reports  CT abdomen pelvis wo contrast    Result Date: 2/23/2022  Narrative: CT ABDOMEN AND PELVIS WITHOUT IV CONTRAST INDICATION:   bladder/rectal pain, bladder cancer with current radiation  COMPARISON:  11/11/2021 TECHNIQUE:  CT examination of the abdomen and pelvis was performed without intravenous contrast   Axial, sagittal, and coronal 2D reformatted images were created from the source data and submitted for interpretation  Radiation dose length product (DLP) for this visit:  614 mGy-cm   This examination, like all CT scans performed in the [facility], was performed utilizing techniques to minimize radiation dose exposure, including the use of iterative reconstruction and automated exposure control  Enteric contrast was not administered  FINDINGS: ABDOMEN LOWER CHEST:  Coronary artery calcifications  LIVER/BILIARY TREE:  Unremarkable  GALLBLADDER:  Gallbladder is surgically absent  SPLEEN:  Unremarkable  PANCREAS:  Unremarkable   ADRENAL GLANDS:  Unremarkable  KIDNEYS/URETERS:  A right ureteral stent is in place  However, there is moderate bilateral hydronephrosis  There are bilateral renal cysts  STOMACH AND BOWEL:  There is mild rectal wall thickening suggestive of a proctitis  Colonic diverticula are present  The patient has had prior Shaji-en-Y gastric bypass surgery  APPENDIX:  No findings to suggest appendicitis  ABDOMINOPELVIC CAVITY:  No ascites  No pneumoperitoneum  No lymphadenopathy  VESSELS:  Unremarkable for patient's age  PELVIS REPRODUCTIVE ORGANS:  The prostate is enlarged  URINARY BLADDER:  There has been interval worsening of diffuse bladder wall thickening most likely representing cystitis  Cannot exclude progression of tumor  ABDOMINAL WALL/INGUINAL REGIONS:  Unremarkable  OSSEOUS STRUCTURES:  No acute fracture or destructive osseous lesion  Impression: 1  Marked worsening of diffuse bladder wall thickening most likely representing severe cystitis  Cannot exclude progression of tumor  2   Moderate bilateral hydronephrosis  Findings likely related to bladder pathology  Cannot exclude right ureteral stent malfunction  3   Mild wall thickening of the rectum  Cannot exclude proctitis  The study was marked in [facility] for immediate notification  Workstation performed: [id number]     US kidney and bladder    Result Date: 2/17/2022  Narrative: RENAL ULTRASOUND INDICATION:   R10 9: Unspecified abdominal pain  Bladder cancer  Urinary tract infections  Penile and back pain  Questionable kidney stones  COMPARISON: December 3, 2004 TECHNIQUE:   Ultrasound of the retroperitoneum was performed with a curvilinear transducer utilizing volumetric sweeps and still imaging techniques  FINDINGS: KIDNEYS: Symmetric and normal size  Right kidney:  11 4 x 6 3 x 5 9 cm  Volume 225 6 mL Left kidney:  11 9 x 5 9 x 5 1 cm  Volume 186 0 mL Right kidney Increased cortical echogenicity  No mass is identified   Lower pole simple cyst measuring 3  1 cm  No hydronephrosis  No shadowing calculi  No perinephric fluid collections  Left kidney Normal echogenicity and contour  No mass is identified  Upper interpolar simple cyst measuring 5 1 cm  No hydronephrosis  No shadowing calculi  No perinephric fluid collections  URETERS: Nonvisualized  BLADDER: Adequately distended  Urinary bladder wall thickening, known based on prior CT  Ureter stent in the bladder lumen  Bilateral ureteral jets detected  Impression: Mild medical renal disease  Bilateral simple renal cysts  No hydronephrosis  No evidence of renal colic  Right side double-J ureter stent  Urinary bladder wall thickening - known to be  bladder cancer  Workstation performed: [id number]       [employer] / [id number] Records Reviewed: Yes     ** Please Note: This note has been constructed using a voice recognition system   **

## 2022-02-24 NOTE — ASSESSMENT & PLAN NOTE
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  · Currently undergoing radiation and chemotherapy  · Retained right ureteral stent from 10/24/21  · Moderate bilateral hydronephrosis likely secondary to related bladder pathology  Cannot exclude right ureteral stent malfunction  · Creatinine 4 04--3 87--3 7--3 6  Recent baseline around 3 1-3 5  · IR was consulted   · Bbilateral PCN placement   · Left 795 ml over 24 hours, clear yellow   Patient emptied once on own with no measurement   · Right 675 ml over 24 hours, clear yellow    Patient emptied once on own with no measurement   · If patient does well with PCNs, we may remove right ureteral stent early this week

## 2022-02-24 NOTE — PLAN OF CARE
Problem: PAIN - ADULT  Goal: Verbalizes/displays adequate comfort level or baseline comfort level  Description: Interventions:  - Encourage patient to monitor pain and request assistance  - Assess pain using appropriate pain scale  - Administer analgesics based on type and severity of pain and evaluate response  - Implement non-pharmacological measures as appropriate and evaluate response  - Consider cultural and social influences on pain and pain management  - Notify physician/advanced practitioner if interventions unsuccessful or patient reports new pain  Outcome: Progressing     Problem: INFECTION - ADULT  Goal: Absence or prevention of progression during hospitalization  Description: INTERVENTIONS:  - Assess and monitor for signs and symptoms of infection  - Monitor lab/diagnostic results  - Monitor all insertion sites, i e  indwelling lines, tubes, and drains  - Monitor endotracheal if appropriate and nasal secretions for changes in amount and color  - Webster appropriate cooling/warming therapies per order  - Administer medications as ordered  - Instruct and encourage patient and family to use good hand hygiene technique  - Identify and instruct in appropriate isolation precautions for identified infection/condition  Outcome: Progressing  Goal: Absence of fever/infection during neutropenic period  Description: INTERVENTIONS:  - Monitor WBC    Outcome: Progressing     Problem: SAFETY ADULT  Goal: Patient will remain free of falls  Description: INTERVENTIONS:  - Educate patient/family on patient safety including physical limitations  - Instruct patient to call for assistance with activity   - Consult OT/PT to assist with strengthening/mobility   - Keep Call bell within reach  - Keep bed low and locked with side rails adjusted as appropriate  - Keep care items and personal belongings within reach  - Initiate and maintain comfort rounds  - Make Fall Risk Sign visible to staff  - Offer Toileting every 2 Hours, in advance of need  - Initiate/Maintain bedalarm  - Obtain necessary fall risk management equipment: at bedside  - Apply yellow socks and bracelet for high fall risk patients  - Consider moving patient to room near nurses station  Outcome: Progressing  Goal: Maintain or return to baseline ADL function  Description: INTERVENTIONS:  -  Assess patient's ability to carry out ADLs; assess patient's baseline for ADL function and identify physical deficits which impact ability to perform ADLs (bathing, care of mouth/teeth, toileting, grooming, dressing, etc )  - Assess/evaluate cause of self-care deficits   - Assess range of motion  - Assess patient's mobility; develop plan if impaired  - Assess patient's need for assistive devices and provide as appropriate  - Encourage maximum independence but intervene and supervise when necessary  - Involve family in performance of ADLs  - Assess for home care needs following discharge   - Consider OT consult to assist with ADL evaluation and planning for discharge  - Provide patient education as appropriate  Outcome: Progressing  Goal: Maintains/Returns to pre admission functional level  Description: INTERVENTIONS:  - Perform BMAT or MOVE assessment daily    - Set and communicate daily mobility goal to care team and patient/family/caregiver  - Collaborate with rehabilitation services on mobility goals if consulted  - Perform Range of Motion 2 times a day  - Reposition patient every 2 hours    - Dangle patient 2 times a day  - Stand patient 2 times a day  - Ambulate patient 2 times a day  - Out of bed to chair 2 times a day   - Out of bed for meals 2 times a day  - Out of bed for toileting  - Record patient progress and toleration of activity level   Outcome: Progressing     Problem: DISCHARGE PLANNING  Goal: Discharge to home or other facility with appropriate resources  Description: INTERVENTIONS:  - Identify barriers to discharge w/patient and caregiver  - Arrange for needed discharge resources and transportation as appropriate  - Identify discharge learning needs (meds, wound care, etc )  - Arrange for interpretive services to assist at discharge as needed  - Refer to Case Management Department for coordinating discharge planning if the patient needs post-hospital services based on physician/advanced practitioner order or complex needs related to functional status, cognitive ability, or social support system  Outcome: Progressing     Problem: Knowledge Deficit  Goal: Patient/family/caregiver demonstrates understanding of disease process, treatment plan, medications, and discharge instructions  Description: Complete learning assessment and assess knowledge base    Interventions:  - Provide teaching at level of understanding  - Provide teaching via preferred learning methods  Outcome: Progressing     Problem: DISCHARGE PLANNING - CARE MANAGEMENT  Goal: Discharge to post-acute care or home with appropriate resources  Description: INTERVENTIONS:  - Conduct assessment to determine patient/family and health care team treatment goals, and need for post-acute services based on payer coverage, community resources, and patient preferences, and barriers to discharge  - Address psychosocial, clinical, and financial barriers to discharge as identified in assessment in conjunction with the patient/family and health care team  - Arrange appropriate level of post-acute services according to patients   needs and preference and payer coverage in collaboration with the physician and health care team  - Communicate with and update the patient/family, physician, and health care team regarding progress on the discharge plan  - Arrange appropriate transportation to post-acute venues  Outcome: Progressing     Problem: Potential for Falls  Goal: Patient will remain free of falls  Description: INTERVENTIONS:  - Educate patient/family on patient safety including physical limitations  - Instruct patient to call for assistance with activity   - Consult OT/PT to assist with strengthening/mobility   - Keep Call bell within reach  - Keep bed low and locked with side rails adjusted as appropriate  - Keep care items and personal belongings within reach  - Initiate and maintain comfort rounds  - Make Fall Risk Sign visible to staff  - Offer Toileting every 2 Hours, in advance of need  - Initiate/Maintain 2alarm  - Obtain necessary fall risk management equipment: at bedside  - Apply yellow socks and bracelet for high fall risk patients  - Consider moving patient to room near nurses station  Outcome: Progressing     Problem: Prexisting or High Potential for Compromised Skin Integrity  Goal: Skin integrity is maintained or improved  Description: INTERVENTIONS:  - Identify patients at risk for skin breakdown  - Assess and monitor skin integrity  - Assess and monitor nutrition and hydration status  - Monitor labs   - Assess for incontinence   - Turn and reposition patient  - Assist with mobility/ambulation  - Relieve pressure over bony prominences  - Avoid friction and shearing  - Provide appropriate hygiene as needed including keeping skin clean and dry  - Evaluate need for skin moisturizer/barrier cream  - Collaborate with interdisciplinary team   - Patient/family teaching  - Consider wound care consult   Outcome: Progressing

## 2022-02-24 NOTE — ASSESSMENT & PLAN NOTE
Lab Results   Component Value Date    HGBA1C 7 7 (H) 09/03/2021       Recent Labs     02/23/22  2203 02/24/22  0813 02/24/22  1117   POCGLU 227* 172* 170*       Blood Sugar Average: Last 72 hrs:  · (P) 226 [id number] continue Lantus 18 unis HS,   insulin aspart held due to need for possible procedures  · Monitor Accu-Cheks, sliding scale for tcoverage

## 2022-02-24 NOTE — ASSESSMENT & PLAN NOTE
· Urine culture 2/17 with 70-79K of Enterococcus faecalis  · Levaquin 500 mg po daily x 7 days sent to pharmacy on 2/20  · On CT in ED, marked worsening of diffuse bladder wall thickening most likely representing severe cystitis  Cannot rule out progression of bladder tumor     · Blood cultures no growth at 72 hours

## 2022-02-24 NOTE — CONSULTS
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  · Currently undergoing radiation and chemotherapy  · No surgical intervention indicated at this time given patient's extensive comorbidities  · Retained right ureteral stent from 10/24/21  · Moderate bilateral hydronephrosis likely secondary to related bladder pathology  Cannot exclude right ureteral stent malfunction  · Creatinine 4 04 in ED yesterday, down to 3 87  Recent baseline around 3 1-3 5  · Discussed with Mr Yudelka Jimenez  Given GARY, bilateral flank pain, and indwelling right ureteral stent, recommend bilateral PCNs to be placed tomorrow with IR for further decompression and pain management  · Discussed with Dr Justa Soria from IR  · Tentative plan for bilateral PCN placement tomorrow  · Orders in for NPO at midnight  · Patient verbalizes understanding of plan and procedure  He is agreeable  · If patient does well with PCNs, we may remove right ureteral stent early next week  * UTI (urinary tract infection)  Assessment & Plan  · Urine culture 2/17 with 70-79K of Enterococcus faecalis  · Levaquin 500 mg po daily x 7 days sent to pharmacy on 2/20  · On CT in ED, marked worsening of diffuse bladder wall thickening most likely representing severe cystitis  Cannot rule out progression of bladder tumor  · Repeat urine and blood cultures x2 pending  · Empirically on IV Rocephin  Primary team to narrow per sensitivities  Subjective:   Patient is a 40-year-old male well known to our service with recurrent, BCG refractory, Stage II, [id number], 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  Also with history of CAD s/p 7 cardiac stents, CKD stage 4, DM, HTN,a nd HLD  He is s/p TURBT on 10/18/2021    Patient developed gross hematuria following his procedure and was taken back to the OR on 10/24/2021 for cystoscopy with clot evacuation fulguration  Right ureteral stent was placed at that time for decompression of right kidney and was left in place in anticipation of future procedures  Patient was recently evaluated by [facility] on 02/18/2022 who did not recommend any surgical intervention at this time given his extensive comorbidities  Patient has had severe urethral and rectal pain x2 weeks  He had positive urine culture on 02/17 for Enterococcus in our office and Levaquin to his pharmacy on 2/20  He was seen by his PCP yesterday  Our office was contacted for patient to be evaluated the same day secondary to his severe pain  I evaluated patient in the office yesterday  He described significant urethral pain that radiated to his rectum Q15 minutes  She states the pain is exacerbated by the urge to void  He states he passes little to no urine during these episodes  He also noted bilateral flank pain  PVR in the office was only 8 mL  Patient had 2 severe attacks of his pain in the office prior to EMS arriving to transfer patient to ED for further evaluation and management per Dr Trice Rivera recommendations  Patient states he has had little improvement in his pain since admission  Review of Systems   Constitutional: Positive for fatigue  Negative for chills and fever  HENT: Negative  Respiratory: Negative  Cardiovascular: Negative  Gastrointestinal: Negative for abdominal pain, constipation (Notes only passing small volumes but denies hard or black stools), nausea and vomiting  Genitourinary: Positive for flank pain (bilateral), frequency, penile pain (Urethral pain) and urgency  Negative for difficulty urinating, dysuria, hematuria and testicular pain  Urethral pain that radiates to the rectum   Skin: Negative  Neurological: Negative        Objective:  Vitals: Blood pressure 131/72, pulse 78, temperature (!) 97 2 °F (36 2 °C), temperature source Temporal, resp  rate 16, weight 104 kg (229 lb 4 5 oz), SpO2 98 %  ,Body mass index is 30 67 kg/m²  Physical Exam  Vitals reviewed  Constitutional:       General: He is not in acute distress  Appearance: Normal appearance  He is obese  He is not ill-appearing, toxic-appearing or diaphoretic  Comments: Currently resting comfortably in his bed   HENT:      Head: Normocephalic and atraumatic  Eyes:      Conjunctiva/sclera: Conjunctivae normal    Cardiovascular:      Rate and Rhythm: Normal rate and regular rhythm  Heart sounds: Normal heart sounds  Pulmonary:      Effort: Pulmonary effort is normal  No respiratory distress  Breath sounds: Normal breath sounds  Abdominal:      General: Bowel sounds are normal  There is no distension  Palpations: Abdomen is soft  Tenderness: There is no abdominal tenderness  There is no right CVA tenderness, left CVA tenderness, guarding or rebound  Genitourinary:     Penis: Normal        Testes: Normal    Musculoskeletal:         General: Normal range of motion  Cervical back: Normal range of motion  Skin:     General: Skin is warm and dry  Neurological:      General: No focal deficit present  Mental Status: He is alert and oriented to person, place, and time  Psychiatric:         Mood and Affect: Mood normal          Behavior: Behavior normal          Thought Content:  Thought content normal          Judgment: Judgment normal        Imagin22: CT ABDOMEN AND PELVIS WITHOUT IV CONTRAST     INDICATION:   bladder/rectal pain, bladder cancer with current radiation      COMPARISON:  2021     TECHNIQUE:  CT examination of the abdomen and pelvis was performed without intravenous contrast   Axial, sagittal, and coronal 2D reformatted images were created from the source data and submitted for interpretation       Radiation dose length product (DLP) for this visit:  614 mGy-cm   This examination, like all CT scans performed in the [facility], was performed utilizing techniques to minimize radiation dose exposure, including the use of iterative   reconstruction and automated exposure control       Enteric contrast was not administered       FINDINGS:     ABDOMEN     LOWER CHEST:  Coronary artery calcifications      LIVER/BILIARY TREE:  Unremarkable      GALLBLADDER:  Gallbladder is surgically absent      SPLEEN:  Unremarkable      PANCREAS:  Unremarkable      ADRENAL GLANDS:  Unremarkable      KIDNEYS/URETERS:  A right ureteral stent is in place  However, there is moderate bilateral hydronephrosis  There are bilateral renal cysts      STOMACH AND BOWEL:  There is mild rectal wall thickening suggestive of a proctitis  Colonic diverticula are present  The patient has had prior Shaji-en-Y gastric bypass surgery      APPENDIX:  No findings to suggest appendicitis      ABDOMINOPELVIC CAVITY:  No ascites  No pneumoperitoneum  No lymphadenopathy      VESSELS:  Unremarkable for patient's age      PELVIS     REPRODUCTIVE ORGANS:  The prostate is enlarged      URINARY BLADDER:  There has been interval worsening of diffuse bladder wall thickening most likely representing cystitis  Cannot exclude progression of tumor      ABDOMINAL WALL/INGUINAL REGIONS:  Unremarkable      OSSEOUS STRUCTURES:  No acute fracture or destructive osseous lesion      IMPRESSION:     1  Marked worsening of diffuse bladder wall thickening most likely representing severe cystitis  Cannot exclude progression of tumor  2   Moderate bilateral hydronephrosis  Findings likely related to bladder pathology  Cannot exclude right ureteral stent malfunction  3   Mild wall thickening of the rectum  Cannot exclude proctitis      The study was marked in EPIC for immediate notification      Labs:  Recent Labs     02/23/22  1521 02/24/22  0641   WBC 7 19 5 94     Recent Labs     02/23/22  1521 22  0641   HGB 10 4* 9 7*       Recent Labs     22  1521 22  0641   CREATININE 4 04* 3 87*       Microbiology:  Component      Latest Ref Rng & Units 2022           3:43 PM   Clarity, UA       Cloudy   Color, UA       Light Yellow   SL AMB SPECIFIC GRAVITY-URINE      1 003 - 1 030 1 025   pH, UA      4 5, 5 0, 5 5, 6 0, 6 5, 7 0, 7 5, 8 0 6 0   Glucose, UA      Negative mg/dl Negative   Ketones, UA      Negative mg/dl Negative   Blood, UA      Negative Large (A)   POCT URINE PROTEIN      Negative mg/dl 100 (2+) (A)   Nitrite, UA      Negative Negative   Bilirubin, UA      Negative Negative   SL AMB POCT UROBILINOGEN      0 2, 1 0 E U /dl E U /dl 0 2   Leukocytes, UA      Negative Moderate (A)     Component      Latest Ref Rng & Units 2022   WBC, UA      None Seen, 2-4, 5-60 /hpf Innumerable (A)   RBC, UA      None Seen, 2-4 /hpf 30-50 (A)   Bacteria, UA      None Seen, Occasional /hpf Occasional   Epithelial Cells      None Seen, Occasional /hpf None Seen     History:  Social History     Socioeconomic History    Marital status: /Civil Union     Spouse name: None    Number of children: None    Years of education: None    Highest education level: None   Occupational History    None   Tobacco Use    Smoking status: Former Smoker     Packs/day: 3 00     Years: 27 00     Pack years: 81 00     Types: Cigarettes     Quit date: 1970     Years since quittin 1    Smokeless tobacco: Never Used   Vaping Use    Vaping Use: Never used   Substance and Sexual Activity    Alcohol use: Not Currently     Comment: beer / liquor    Drug use: Not Currently     Types: Marijuana     Comment: quit 2019 had medical marijuana    Sexual activity: Not Currently   Other Topics Concern    None   Social History Narrative    Consumes 1 cup of coffee and 1 soda per day     Social Determinants of Health     Financial Resource Strain: Not on file   Food Insecurity: Not on file   Transportation Needs: Not on file   Physical Activity: Not on file   Stress: Not on file   Social Connections: Not on file   Intimate Partner Violence: Not on file   Housing Stability: Not on file       Past Medical History:   Diagnosis Date    Anemia     Last assessed: 9/28/17    Anxiety     Arteriosclerotic cardiovascular disease     Last assessed: 9/28/17    Arthritis     Bladder cancer ([facility] [address] )     bladder- had BCG treatments    Chronic kidney disease     Stage IV    CKD (chronic kidney disease) stage 4, GFR 15-29 ml/min ([facility])     Colon polyp     Coronary artery disease     7 stents    Depression     Diabetes mellitus ([facility])     IDDM    GERD (gastroesophageal reflux disease)     Glaucoma     Hematuria     History of fusion of cervical spine     Hyperlipidemia     Hypertension     Insomnia     Last assessed: 11/14/12    Loss of hearing     has hearing aids but usually does not wear them    Other seasonal allergic rhinitis     Last assessed: 2/10/16    PAD (peripheral artery disease) ([facility])     Shortness of breath     on exertion    Spinal stenosis of lumbar region     Transient cerebral ischemia     No Residual    Uses walker     w/c for longer distances     Past Surgical History:   Procedure Laterality Date    CARDIAC SURGERY      Cath stent placement  Last assessed: 3/9/17  Interventional Catheterization    CHOLECYSTECTOMY      COLONOSCOPY      CYSTOSCOPY      Diagnostic w/biopsy  Tana Walters  Last assessed: 12/1/14    CYSTOSCOPY W/ URETERAL STENT PLACEMENT Bilateral 10/18/2021    Procedure: bilateral retrogrades, cytology collection;  Surgeon: Mabelene Kocher, MD;  [facility];  Service: Urology    CYSTOURETHROSCOPY      w/cautery  Tana Walters    FL RETROGRADE PYELOGRAM  10/18/2021    FL RETROGRADE PYELOGRAM  10/24/2021    GASTRIC BYPASS      For morbid obesity w/Shaji-en-Y   Resolved: 11/17/09    INCISION AND DRAINAGE OF WOUND Right 2/26/2017    Procedure: INCISION AND DRAINAGE (I&D) EXTREMITY WITH APPLICATION OF GRAFT JACKET;  Surgeon: Skip Hernandez DPM;  [facility];  Service:     INCISION AND DRAINAGE OF WOUND Right 4/25/2017    Procedure: INCISION AND DRAINAGE (I&D) EXTREMITY, APPLICATION OF GRAFT;  Surgeon: Skip Hernandez DPM;  [facility];  Service:     IR BIOPSY OTHER  7/2/2020    IR LOWER EXTREMITY ANGIOGRAM  2/8/2021    IR LOWER EXTREMITY ANGIOGRAM  2/11/2021    IR PORT PLACEMENT  1/17/2022    IR TUNNELED CENTRAL LINE PLACEMENT  12/24/2020    JOINT REPLACEMENT      christofer knees replaced    MD AMPUTATION METATARSAL+TOE,SINGLE Left 12/21/2020    Procedure: RAY RESECTION FOOT;  Surgeon: Tony Knox DPM;  [facility];  Service: Podiatry    MD AMPUTATION METATARSAL+TOE,SINGLE Left 12/31/2020    Procedure: 5TH MET RESECTION;  Surgeon: Tony Knox DPM;  [facility];  Service: Podiatry    MD CYSTOURETHROSCOPY W/IRRIG & EVAC CLOTS N/A 2/10/2021    Procedure: CYSTOSCOPY EVACUATION OF CLOTS, fulguration;  Surgeon: Kellie Garcia MD;  [facility];  Service: Urology    MD CYSTOURETHROSCOPY W/IRRIG & EVAC CLOTS N/A 10/24/2021    Procedure: CYSTOSCOPY EVACUATION OF CLOT, fulguration of bleeding vessels, right ureter stent placement, retrograde pyelogram;  Surgeon: Kofi Mendez MD;  [facility];  Service: Urology    MD CYSTOURETHROSCOPY,BIOPSY N/A 8/16/2016    Procedure: CYSTOSCOPY WITH BIOPSIES;  Surgeon: Rashard Lebron MD;  [facility];  Service: Urology    MD CYSTOURETHROSCOPY,FULGUR <0 5 CM LESN N/A 11/19/2020    Procedure: CYSTO W/BIOPSIES, transurethral prostate bx;  Surgeon: Bill Martinez MD;  [facility];  Service: Urology    MD CYSTOURETHROSCOPY,FULGUR >5 CM LESN Bilateral 10/18/2021    Procedure: TRANSURETHRAL RESECTION OF BLADDER TUMOR (TURBT);   Surgeon: Sumit Mccann MD;  [facility];  Service: Urology    MD Yaneli Herd 3RD+ ORD SLCTV ABDL PEL/LXTR [facility] Left 2/8/2021 Procedure: LEG angiogram, CO2 w/limited contrast with balloon angioplasty postertior tibial artery;  Surgeon: Santana Morin MD;  [facility];  Service: Vascular    ROTATOR CUFF REPAIR      SMALL INTESTINE SURGERY      Surgery Shaji-en-Y    SPINAL FUSION      lumbar and cervical fusions    VAC DRESSING APPLICATION Right 2/10/7327    Procedure: APPLICATION VAC DRESSING;  Surgeon: Alva Burleson DPM;  [facility];  Service:     WOUND DEBRIDEMENT Left 2/16/2021    Procedure: FOOT DEBRIDE, [address] OUT w/graft application;  Surgeon: Alva Burleson DPM;  [facility];  Service: Podiatry     Family History   Problem Relation Age of Onset    Diabetes Mother     Heart disease Mother     Other Mother         High blood pressure    Heart disease Father     Diabetes Sister     Other Sister         High blood pressure    Kidney disease Sister     Heart disease Brother     Other Brother         High blood pressure       [place], [place]  Date: 2/24/2022 Time: 12:59 PM

## 2022-02-24 NOTE — H&P (VIEW-ONLY)
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  · Currently undergoing radiation and chemotherapy  · No surgical intervention indicated at this time given patient's extensive comorbidities  · Retained right ureteral stent from 10/24/21  · Moderate bilateral hydronephrosis likely secondary to related bladder pathology  Cannot exclude right ureteral stent malfunction  · Creatinine 4 04 in ED yesterday, down to 3 87  Recent baseline around 3 1-3 5  · Discussed with Mr Ruthie Peñaloza  Given GARY, bilateral flank pain, and indwelling right ureteral stent, recommend bilateral PCNs to be placed tomorrow with IR for further decompression and pain management  · Discussed with Dr Sb Mahajan from IR  · Tentative plan for bilateral PCN placement tomorrow  · Orders in for NPO at midnight  · Patient verbalizes understanding of plan and procedure  He is agreeable  · If patient does well with PCNs, we may remove right ureteral stent early next week  * UTI (urinary tract infection)  Assessment & Plan  · Urine culture 2/17 with 70-79K of Enterococcus faecalis  · Levaquin 500 mg po daily x 7 days sent to pharmacy on 2/20  · On CT in ED, marked worsening of diffuse bladder wall thickening most likely representing severe cystitis  Cannot rule out progression of bladder tumor  · Repeat urine and blood cultures x2 pending  · Empirically on IV Rocephin  Primary team to narrow per sensitivities  Subjective:   Patient is a 77-year-old male well known to our service with recurrent, BCG refractory, Stage II, [id number], 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  Also with history of CAD s/p 7 cardiac stents, CKD stage 4, DM, HTN,a nd HLD  He is s/p TURBT on 10/18/2021    Patient developed gross hematuria following his procedure and was taken back to the OR on 10/24/2021 for cystoscopy with clot evacuation fulguration  Right ureteral stent was placed at that time for decompression of right kidney and was left in place in anticipation of future procedures  Patient was recently evaluated by [facility] on 02/18/2022 who did not recommend any surgical intervention at this time given his extensive comorbidities  Patient has had severe urethral and rectal pain x2 weeks  He had positive urine culture on 02/17 for Enterococcus in our office and Levaquin to his pharmacy on 2/20  He was seen by his PCP yesterday  Our office was contacted for patient to be evaluated the same day secondary to his severe pain  I evaluated patient in the office yesterday  He described significant urethral pain that radiated to his rectum Q15 minutes  She states the pain is exacerbated by the urge to void  He states he passes little to no urine during these episodes  He also noted bilateral flank pain  PVR in the office was only 8 mL  Patient had 2 severe attacks of his pain in the office prior to EMS arriving to transfer patient to ED for further evaluation and management per Dr Mario Covert recommendations  Patient states he has had little improvement in his pain since admission  Review of Systems   Constitutional: Positive for fatigue  Negative for chills and fever  HENT: Negative  Respiratory: Negative  Cardiovascular: Negative  Gastrointestinal: Negative for abdominal pain, constipation (Notes only passing small volumes but denies hard or black stools), nausea and vomiting  Genitourinary: Positive for flank pain (bilateral), frequency, penile pain (Urethral pain) and urgency  Negative for difficulty urinating, dysuria, hematuria and testicular pain  Urethral pain that radiates to the rectum   Skin: Negative  Neurological: Negative        Objective:  Vitals: Blood pressure 131/72, pulse 78, temperature (!) 97 2 °F (36 2 °C), temperature source Temporal, resp  rate 16, weight 104 kg (229 lb 4 5 oz), SpO2 98 %  ,Body mass index is 30 67 kg/m²  Physical Exam  Vitals reviewed  Constitutional:       General: He is not in acute distress  Appearance: Normal appearance  He is obese  He is not ill-appearing, toxic-appearing or diaphoretic  Comments: Currently resting comfortably in his bed   HENT:      Head: Normocephalic and atraumatic  Eyes:      Conjunctiva/sclera: Conjunctivae normal    Cardiovascular:      Rate and Rhythm: Normal rate and regular rhythm  Heart sounds: Normal heart sounds  Pulmonary:      Effort: Pulmonary effort is normal  No respiratory distress  Breath sounds: Normal breath sounds  Abdominal:      General: Bowel sounds are normal  There is no distension  Palpations: Abdomen is soft  Tenderness: There is no abdominal tenderness  There is no right CVA tenderness, left CVA tenderness, guarding or rebound  Genitourinary:     Penis: Normal        Testes: Normal    Musculoskeletal:         General: Normal range of motion  Cervical back: Normal range of motion  Skin:     General: Skin is warm and dry  Neurological:      General: No focal deficit present  Mental Status: He is alert and oriented to person, place, and time  Psychiatric:         Mood and Affect: Mood normal          Behavior: Behavior normal          Thought Content:  Thought content normal          Judgment: Judgment normal        Imagin22: CT ABDOMEN AND PELVIS WITHOUT IV CONTRAST     INDICATION:   bladder/rectal pain, bladder cancer with current radiation      COMPARISON:  2021     TECHNIQUE:  CT examination of the abdomen and pelvis was performed without intravenous contrast   Axial, sagittal, and coronal 2D reformatted images were created from the source data and submitted for interpretation       Radiation dose length product (DLP) for this visit:  614 mGy-cm   This examination, like all CT scans performed in the [facility], was performed utilizing techniques to minimize radiation dose exposure, including the use of iterative   reconstruction and automated exposure control       Enteric contrast was not administered       FINDINGS:     ABDOMEN     LOWER CHEST:  Coronary artery calcifications      LIVER/BILIARY TREE:  Unremarkable      GALLBLADDER:  Gallbladder is surgically absent      SPLEEN:  Unremarkable      PANCREAS:  Unremarkable      ADRENAL GLANDS:  Unremarkable      KIDNEYS/URETERS:  A right ureteral stent is in place  However, there is moderate bilateral hydronephrosis  There are bilateral renal cysts      STOMACH AND BOWEL:  There is mild rectal wall thickening suggestive of a proctitis  Colonic diverticula are present  The patient has had prior Shaji-en-Y gastric bypass surgery      APPENDIX:  No findings to suggest appendicitis      ABDOMINOPELVIC CAVITY:  No ascites  No pneumoperitoneum  No lymphadenopathy      VESSELS:  Unremarkable for patient's age      PELVIS     REPRODUCTIVE ORGANS:  The prostate is enlarged      URINARY BLADDER:  There has been interval worsening of diffuse bladder wall thickening most likely representing cystitis  Cannot exclude progression of tumor      ABDOMINAL WALL/INGUINAL REGIONS:  Unremarkable      OSSEOUS STRUCTURES:  No acute fracture or destructive osseous lesion      IMPRESSION:     1  Marked worsening of diffuse bladder wall thickening most likely representing severe cystitis  Cannot exclude progression of tumor  2   Moderate bilateral hydronephrosis  Findings likely related to bladder pathology  Cannot exclude right ureteral stent malfunction  3   Mild wall thickening of the rectum  Cannot exclude proctitis      The study was marked in EPIC for immediate notification      Labs:  Recent Labs     02/23/22  1521 02/24/22  0641   WBC 7 19 5 94     Recent Labs     02/23/22  1521 22  0641   HGB 10 4* 9 7*       Recent Labs     22  1521 22  0641   CREATININE 4 04* 3 87*       Microbiology:  Component      Latest Ref Rng & Units 2022           3:43 PM   Clarity, UA       Cloudy   Color, UA       Light Yellow   SL AMB SPECIFIC GRAVITY-URINE      1 003 - 1 030 1 025   pH, UA      4 5, 5 0, 5 5, 6 0, 6 5, 7 0, 7 5, 8 0 6 0   Glucose, UA      Negative mg/dl Negative   Ketones, UA      Negative mg/dl Negative   Blood, UA      Negative Large (A)   POCT URINE PROTEIN      Negative mg/dl 100 (2+) (A)   Nitrite, UA      Negative Negative   Bilirubin, UA      Negative Negative   SL AMB POCT UROBILINOGEN      0 2, 1 0 E U /dl E U /dl 0 2   Leukocytes, UA      Negative Moderate (A)     Component      Latest Ref Rng & Units 2022   WBC, UA      None Seen, 2-4, 5-60 /hpf Innumerable (A)   RBC, UA      None Seen, 2-4 /hpf 30-50 (A)   Bacteria, UA      None Seen, Occasional /hpf Occasional   Epithelial Cells      None Seen, Occasional /hpf None Seen     History:  Social History     Socioeconomic History    Marital status: /Civil Union     Spouse name: None    Number of children: None    Years of education: None    Highest education level: None   Occupational History    None   Tobacco Use    Smoking status: Former Smoker     Packs/day: 3 00     Years: 27 00     Pack years: 81 00     Types: Cigarettes     Quit date: 1970     Years since quittin 1    Smokeless tobacco: Never Used   Vaping Use    Vaping Use: Never used   Substance and Sexual Activity    Alcohol use: Not Currently     Comment: beer / liquor    Drug use: Not Currently     Types: Marijuana     Comment: quit 2019 had medical marijuana    Sexual activity: Not Currently   Other Topics Concern    None   Social History Narrative    Consumes 1 cup of coffee and 1 soda per day     Social Determinants of Health     Financial Resource Strain: Not on file   Food Insecurity: Not on file   Transportation Needs: Not on file   Physical Activity: Not on file   Stress: Not on file   Social Connections: Not on file   Intimate Partner Violence: Not on file   Housing Stability: Not on file       Past Medical History:   Diagnosis Date    Anemia     Last assessed: 9/28/17    Anxiety     Arteriosclerotic cardiovascular disease     Last assessed: 9/28/17    Arthritis     Bladder cancer ([facility] [address] )     bladder- had BCG treatments    Chronic kidney disease     Stage IV    CKD (chronic kidney disease) stage 4, GFR 15-29 ml/min ([facility])     Colon polyp     Coronary artery disease     7 stents    Depression     Diabetes mellitus ([facility])     IDDM    GERD (gastroesophageal reflux disease)     Glaucoma     Hematuria     History of fusion of cervical spine     Hyperlipidemia     Hypertension     Insomnia     Last assessed: 11/14/12    Loss of hearing     has hearing aids but usually does not wear them    Other seasonal allergic rhinitis     Last assessed: 2/10/16    PAD (peripheral artery disease) ([facility])     Shortness of breath     on exertion    Spinal stenosis of lumbar region     Transient cerebral ischemia     No Residual    Uses walker     w/c for longer distances     Past Surgical History:   Procedure Laterality Date    CARDIAC SURGERY      Cath stent placement  Last assessed: 3/9/17  Interventional Catheterization    CHOLECYSTECTOMY      COLONOSCOPY      CYSTOSCOPY      Diagnostic w/biopsy  Nasrin Ritter  Last assessed: 12/1/14    CYSTOSCOPY W/ URETERAL STENT PLACEMENT Bilateral 10/18/2021    Procedure: bilateral retrogrades, cytology collection;  Surgeon: Evelia Wooten MD;  [facility];  Service: Urology    CYSTOURETHROSCOPY      w/cautery  Nasrin Ritter    FL RETROGRADE PYELOGRAM  10/18/2021    FL RETROGRADE PYELOGRAM  10/24/2021    GASTRIC BYPASS      For morbid obesity w/Shaji-en-Y   Resolved: 11/17/09    INCISION AND DRAINAGE OF WOUND Right 2/26/2017    Procedure: INCISION AND DRAINAGE (I&D) EXTREMITY WITH APPLICATION OF GRAFT JACKET;  Surgeon: Donnell Roy DPM;  [facility];  Service:     INCISION AND DRAINAGE OF WOUND Right 4/25/2017    Procedure: INCISION AND DRAINAGE (I&D) EXTREMITY, APPLICATION OF GRAFT;  Surgeon: Donnell Roy DPM;  [facility];  Service:     IR BIOPSY OTHER  7/2/2020    IR LOWER EXTREMITY ANGIOGRAM  2/8/2021    IR LOWER EXTREMITY ANGIOGRAM  2/11/2021    IR PORT PLACEMENT  1/17/2022    IR TUNNELED CENTRAL LINE PLACEMENT  12/24/2020    JOINT REPLACEMENT      christofer knees replaced    MS AMPUTATION METATARSAL+TOE,SINGLE Left 12/21/2020    Procedure: RAY RESECTION FOOT;  Surgeon: Claudeen Atkinson, DPM;  [facility];  Service: Podiatry    MS AMPUTATION METATARSAL+TOE,SINGLE Left 12/31/2020    Procedure: 5TH MET RESECTION;  Surgeon: Claudeen Atkinson, DPM;  [facility];  Service: Podiatry    MS CYSTOURETHROSCOPY W/IRRIG & EVAC CLOTS N/A 2/10/2021    Procedure: CYSTOSCOPY EVACUATION OF CLOTS, fulguration;  Surgeon: Monica France MD;  [facility];  Service: Urology    MS CYSTOURETHROSCOPY W/IRRIG & EVAC CLOTS N/A 10/24/2021    Procedure: CYSTOSCOPY EVACUATION OF CLOT, fulguration of bleeding vessels, right ureter stent placement, retrograde pyelogram;  Surgeon: Eda Molina MD;  [facility];  Service: Urology    MS CYSTOURETHROSCOPY,BIOPSY N/A 8/16/2016    Procedure: CYSTOSCOPY WITH BIOPSIES;  Surgeon: Corey Isabel MD;  [facility];  Service: Urology    MS CYSTOURETHROSCOPY,FULGUR <0 5 CM LESN N/A 11/19/2020    Procedure: CYSTO W/BIOPSIES, transurethral prostate bx;  Surgeon: Dwain Encinas MD;  [facility];  Service: Urology    MS CYSTOURETHROSCOPY,FULGUR >5 CM LESN Bilateral 10/18/2021    Procedure: TRANSURETHRAL RESECTION OF BLADDER TUMOR (TURBT);   Surgeon: Kala Mosley MD;  [facility];  Service: Urology    MS Jayson Del Rosario 3RD+ [facility] 2/8/2021 Procedure: LEG angiogram, CO2 w/limited contrast with balloon angioplasty postertior tibial artery;  Surgeon: Rommel De La Torre MD;  [facility];  Service: Vascular    ROTATOR CUFF REPAIR      SMALL INTESTINE SURGERY      Surgery Shaji-en-Y    SPINAL FUSION      lumbar and cervical fusions    VAC DRESSING APPLICATION Right 6/46/1073    Procedure: APPLICATION VAC DRESSING;  Surgeon: Ray Almanzar DPM;  [facility];  Service:     WOUND DEBRIDEMENT Left 2/16/2021    Procedure: FOOT DEBRIDE, [address] OUT w/graft application;  Surgeon: Ray Almanzar DPM;  [facility];  Service: Podiatry     Family History   Problem Relation Age of Onset    Diabetes Mother     Heart disease Mother     Other Mother         High blood pressure    Heart disease Father     Diabetes Sister     Other Sister         High blood pressure    Kidney disease Sister     Heart disease Brother     Other Brother         High blood pressure       Cayden Denis, [address]  Date: 2/24/2022 Time: 12:59 PM

## 2022-02-24 NOTE — ANESTHESIA PREPROCEDURE EVALUATION
Procedure:  IR NEPHROSTOMY TUBE PLACEMENT    Relevant Problems   CARDIO   (+) Coronary artery disease of native artery of native heart with stable angina pectoris (HCC)   (+) Hyperlipidemia   (+) Hypertension with renal disease   (+) Stable angina pectoris (HCC)      ENDO   (+) Secondary renal hyperparathyroidism (HCC)   (+) Type 2 diabetes mellitus, with long-term current use of insulin (HCC)      /RENAL   (+) Acute kidney injury superimposed on CKD (HCC)   (+) Acute renal failure (ARF) (HCC)      HEMATOLOGY   (+) Anemia of chronic disease   (+) Chronic anemia   (+) Thrombocytopenia (HCC)      MUSCULOSKELETAL   (+) Back pain   (+) Degeneration of lumbar intervertebral disc   (+) Lumbar spondylosis   (+) Primary osteoarthritis of left knee      NEURO/PSYCH   (+) Chronic pain syndrome   (+) Continuous opioid dependence ([facility])   (+) Diabetic polyneuropathy associated with type 2 diabetes mellitus ([facility])   (+) Moderate major depression, single episode ([facility])   (+) Stable angina pectoris (HCC)      PULMONARY   (+) Chronic bronchitis (HCC)        Physical Exam    Airway    Mallampati score: II  TM Distance: >3 FB  Neck ROM: full     Dental       Cardiovascular  Rhythm: regular, Rate: normal,     Pulmonary  Breath sounds clear to auscultation,     Other Findings        Anesthesia Plan  ASA Score- 4 Emergent    Anesthesia Type- general with ASA Monitors  Additional Monitors:   Airway Plan:           Plan Factors-Exercise tolerance (METS): >4 METS  Chart reviewed  Existing labs reviewed  Patient summary reviewed  Patient is not a current smoker  Patient not instructed to abstain from smoking on day of procedure  Patient did not smoke on day of surgery  Induction- intravenous  Postoperative Plan-     Informed Consent- Anesthetic plan and risks discussed with patient

## 2022-02-24 NOTE — TELEMEDICINE
e-Consult (IPC)  - Interventional Radiology  Lizbet Whitehead 76 y o  male MRN: [id number]  Unit/Bed#: E2 -01 Encounter: [id number]    IR has been consulted to evaluate the patient, determine the appropriate procedure, and whether or not a procedure can and should be performed regarding the care of Lizbet Whitehead  We were consulted by urology concerning b/l hydro, possible stent occlusion, and to possibly perform a b/l PCN placement if medically appropriate for the patient  IP Consult to IR  Consult performed by: EVANGELINA Magaña  Consult ordered by: Kimber Dixon PA-C        02/24/22      Assessment/Recommendation:     76year old male with bladder cancer, current undergoing radiation therapy with 2 weeks of suprapubic, penis, and rectal pain  Recent urine culture from 2/17 positive for enterococcus faecalis  2/3  CT A/P demonstrates severe cystitis ,moderate bilteral hydro, and possible right ureteral stent malfunction and possible proctitis  - NPO after MN   - hold ASA   - plan for b/l PCN placement tomorrow  - appreciate anesthesia support  - will send urine for culture       Total time spent in review of data, discussion with requesting provider and rendering advice was 30 minutes  Patient or appropriate family member was verbally informed by urology of this consultative service on their behalf to provide more timely access to specialty care in lieu of an in person consultation  Verbal consent was obtained  Thank you for allowing Interventional Radiology to participate in the care of Lizbet Whitehead  Please don't hesitate to call or TigerText us with any questions       [address] [place] [address] [place]

## 2022-02-24 NOTE — ASSESSMENT & PLAN NOTE
Lab Results   Component Value Date    EGFR 13 02/24/2022    EGFR 13 02/23/2022    EGFR 17 02/04/2022    CREATININE 3 87 (H) 02/24/2022    CREATININE 4 04 (H) 02/23/2022    CREATININE 3 14 (H) 02/04/2022   · Acute kidney injury on CKD stage 4  · Baseline creatinine in the mid 2s although more recently has been 3 2-3 6  · Follows with [facility] at [address] [place] [address] [address]  · Now with acute kidney injury likely secondary to postobstructive uropathy and UTI  · Cont  IV fluids, mild improved  · PVR, urinary retention protocol  · Monitor renal function

## 2022-02-24 NOTE — ASSESSMENT & PLAN NOTE
This is a 64-year-old male with history of bladder cancer currently on chemotherapy, CAD, CKD stage 4, diabetes mellitus, hypertension, hyperlipidemia presenting with ongoing rectal and penile pain for the past 2 weeks, difficulty urinating  Patient having dysuria, urinary urgency and frequency also went small amounts of blood in urine without any clots  Has been on Levaquin since 02/20  Was seen by urologist today and sent to the ED for evaluation      · CT revealed worsening of diffuse bladder wall thickening likely representing severe cystitis cannot exclude progression of tumor, moderate bilateral hydronephrosis cannot exclude right ureteral stent malfunction, mild wall thickening of rectum cannot exclude proctitis  · Urine culture 217 revealed Enterococcus faecalis  · cont ceftriaxone, follow-up cultures  · Urinary retention protocol  · Pain control, IV fluid  · Urology evaluation appreciated - for possible R PCN 2/25

## 2022-02-24 NOTE — ASSESSMENT & PLAN NOTE
Lab Results   Component Value Date    EGFR 13 02/23/2022    EGFR 17 02/04/2022    EGFR 15 01/25/2022    CREATININE 4 04 (H) 02/23/2022    CREATININE 3 14 (H) 02/04/2022    CREATININE 3 57 (H) 01/25/2022   · Acute kidney injury on CKD stage 4  · Baseline creatinine in the mid 2s although more recently has been 3 2-3 6  · [facility]s [facility]  · Now with acute kidney injury likely secondary to postobstructive uropathy and UTI  · Will trial gentle IV fluids  · PVR, urinary retention protocol  · Monitor renal function

## 2022-02-24 NOTE — PROGRESS NOTES
[address]  Progress Note - Casey Meraz 1945, 76 y o  male MRN: [id number]  Unit/Bed#: E2 -01 Encounter: [id number]  Primary Care Provider: Junito Mane MD   Date and time admitted to hospital: 2/23/2022  2:50 PM    * UTI (urinary tract infection)  Assessment & Plan  This is a 77-year-old male with history of bladder cancer currently on chemotherapy, CAD, CKD stage 4, diabetes mellitus, hypertension, hyperlipidemia presenting with ongoing rectal and penile pain for the past 2 weeks, difficulty urinating  Patient having dysuria, urinary urgency and frequency also went small amounts of blood in urine without any clots  Has been on Levaquin since 02/20  Was seen by urologist today and sent to the ED for evaluation      · CT revealed worsening of diffuse bladder wall thickening likely representing severe cystitis cannot exclude progression of tumor, moderate bilateral hydronephrosis cannot exclude right ureteral stent malfunction, mild wall thickening of rectum cannot exclude proctitis  · Urine culture 217 revealed Enterococcus faecalis  · cont ceftriaxone, follow-up cultures  · Urinary retention protocol  · Pain control, IV fluid  · Urology evaluation appreciated - for possible R PCN 2/25    Acute kidney injury superimposed on CKD [facility])  Assessment & Plan  Lab Results   Component Value Date    EGFR 13 02/24/2022    EGFR 13 02/23/2022    EGFR 17 02/04/2022    CREATININE 3 87 (H) 02/24/2022    CREATININE 4 04 (H) 02/23/2022    CREATININE 3 14 (H) 02/04/2022   · Acute kidney injury on CKD stage 4  · Baseline creatinine in the mid 2s although more recently has been 3 2-3 6  · Follows with [facility] at [address] [place] [address] [address]  · Now with acute kidney injury likely secondary to postobstructive uropathy and UTI  · Cont  IV fluids, mild improved  · PVR, urinary retention protocol  · Monitor renal function    Type 2 diabetes mellitus, with long-term current use of insulin ([facility] [address] )  Assessment & Plan  Lab Results   Component Value Date    HGBA1C 7 7 (H) 2021       Recent Labs     22  2203 22  0813 22  1117   POCGLU 227* 172* 170*       Blood Sugar Average: Last 72 hrs:  · (P) 741 [id number] continue Lantus 18 unis HS,   insulin aspart held due to need for possible procedures  · Monitor Accu-Cheks, sliding scale for tcoverage    Hyperlipidemia  Assessment & Plan  · Continue Zetia    Hypertension with renal disease  Assessment & Plan  · Continue metoprolol 100 mg daily    Carcinoma in situ of bladder  Assessment & Plan  · Patient has history of recurrent urothelial carcinoma bladder  · Status post recent TURBT 10/18/21  · Patient has ongoing rectal and penile pain  · Currently on radiation  · Urology input will be appreciated    Coronary artery disease of native artery of native heart with stable angina pectoris (HCC)  Assessment & Plan  · Continue aspirin, beta-blocker  · Denies any chest pain       VTE  Prophylaxis:   Pharmacologic: in place    Patient Centered Rounds: I have performed bedside rounds with nursing staff today  Discussions with Specialists or Other Care Team Provider: case management    Education and Discussions with Family / Patient: pt       Current Length of Stay: 1 day(s)    Current Patient Status: Inpatient        Code Status: Level 1 - Full Code      Subjective:   Pt seen  Reports suprapubic discomfort on urination  Denies fever  Feels slightly better    Patient is seen and examined at bedside  All other ROS are negative  Objective:     Vitals:   Temp (24hrs), Av 6 °F (36 4 °C), Min:96 2 °F (35 7 °C), Max:99 4 °F (37 4 °C)    Temp:  [96 2 °F (35 7 °C)-99 4 °F (37 4 °C)] 97 2 °F (36 2 °C)  HR:  [78-84] 78  Resp:  [16-18] 16  BP: (120-190)/(66-88) 131/72  SpO2:  [96 %-100 %] 98 %  Body mass index is 30 67 kg/m²  Input and Output Summary (last 24 hours):        Intake/Output Summary (Last 24 hours) at 2022 1211  Last data filed at 2022 1818  Gross per 24 hour   Intake 1250 ml   Output --   Net 1250 ml       Physical Exam:       GEN: No acute distress, comfortable  HEEENT: No JVD, PERRLA, no scleral icterus  RESP: Lungs clear to auscultation bilaterally  CV: RRR, +s1/s2   ABD: SOFT NON TENDER, POSITIVE BOWEL SOUNDS, NO DISTENTION  PSYCH: CALM  NEURO: A X O X 3, NO FOCAL DEFICITS  SKIN: NO RASH  EXTREM: NO EDEMA    Additional Data:     Labs:    Results from last 7 days   Lab Units 02/24/22  0641   WBC Thousand/uL 5 94   HEMOGLOBIN g/dL 9 7*   HEMATOCRIT % 27 9*   PLATELETS Thousands/uL 192   NEUTROS PCT % 72   LYMPHS PCT % 15   MONOS PCT % 10   EOS PCT % 2     Results from last 7 days   Lab Units 02/24/22  0641   SODIUM mmol/L 138   POTASSIUM mmol/L 5 0   CHLORIDE mmol/L 105   CO2 mmol/L 21   BUN mg/dL 52*   CREATININE mg/dL 3 87*   ANION GAP mmol/L 12   CALCIUM mg/dL 8 6   ALBUMIN g/dL 2 9*   TOTAL BILIRUBIN mg/dL 0 32   ALK PHOS U/L 249*   ALT U/L 106*   AST U/L 88*   GLUCOSE RANDOM mg/dL 185*     Results from last 7 days   Lab Units 02/23/22  1521   INR  1 06     Results from last 7 days   Lab Units 02/24/22  1117 02/24/22  0813 02/23/22  2203   POC GLUCOSE mg/dl 170* 172* 227*         Results from last 7 days   Lab Units 02/23/22  1818 02/23/22  1521   LACTIC ACID mmol/L  --  1 1   PROCALCITONIN ng/ml 0 53* 0 53*           * I Have Reviewed All Lab Data Listed Above  Imaging:     No results found for this or any previous visit  Results for orders placed during the hospital encounter of 06/09/20    XR chest 2 views    Narrative  CHEST    INDICATION:   chills  COMPARISON:  7/25/2015 and 2/22/2017    EXAM PERFORMED/VIEWS:  XR CHEST PA & LATERAL  The frontal view was performed utilizing dual energy radiographic technique  Images: 4    FINDINGS:    Cardiomediastinal silhouette appears unremarkable      Linear/oblong triangular shaped density in the right lower lobe not seen on prior exams likely represents scarring or atelectasis however recommend elective chest CT for confirmation of benignity  The lungs are otherwise clear  No pneumothorax or  pleural effusion  Osseous structures appear within normal limits for patient age  Impression  Linear/oblong triangular shaped density in the right lower lobe not seen on prior exams likely represents scarring or atelectasis however recommend elective chest CT for confirmation of benignity  No effusion, airspace disease or pneumothorax  The study was marked in [facility] for immediate notification  Workstation performed: [id number]      *I have reviewed all imaging reports listed above      Recent Cultures (last 7 days):     Results from last 7 days   Lab Units 02/23/22  1521 02/17/22  1226   BLOOD CULTURE  Received in Microbiology Lab  Culture in Progress  Received in Microbiology Lab  Culture in Progress    --    URINE CULTURE   --  70,000-79,000 cfu/ml Enterococcus faecalis*       Last 24 Hours Medication List:   Current Facility-Administered Medications   Medication Dose Route Frequency Provider Last Rate    acetaminophen  650 mg Oral Q6H PRN Dontae Armenta MD      ALPRAZolam  0 25 mg Oral BID PRN Dontae Armenta MD      aspirin  81 mg Oral Daily Dontae Armenta MD      bimatoprost  1 drop Both Eyes HS Dontae Armenta MD      calcitriol  0 25 mcg Oral Daily Dontae Armenta MD      cefTRIAXone  1,000 mg Intravenous Q24H Dontae Armenta MD 1,000 mg (02/24/22 0820)    docusate sodium  100 mg Oral BID PRN Dontae Armenta MD      DULoxetine  20 mg Oral Daily Dontae Armenta MD      ezetimibe  10 mg Oral Early Morning Dontae Armenta MD      gabapentin  300 mg Oral HS Dontae Armenta MD      heparin (porcine)  5,000 Units Subcutaneous [facility] Dontae Armenta MD      HYDROcodone-acetaminophen  1 tablet Oral Q6H PRN Dontae Armenta MD      HYDROmorphone  1 mg Intravenous Q4H PRN Dontae Armenta MD      insulin glargine  18 Units Subcutaneous HS Dontae Armenta MD      insulin lispro  1-5 Units Subcutaneous TID AC Gutierrez Hernandez MD      isosorbide mononitrate  30 mg Oral Early Morning Gutierrez Hernandez MD      metoprolol succinate  100 mg Oral Daily Gutierrez Hernandez MD      ondansetron  4 mg Intravenous Q6H PRN Gutierrez Hernandez MD      oxybutynin  5 mg Oral TID Gutierrez Hernandez MD      pantoprazole  40 mg Oral Early Morning Gutierrez Hernandez MD      sertraline  50 mg Oral Daily Gutierrez Hernandez MD      sodium chloride (PF)  3 mL Intravenous Q1H PRN Gutierrez Hernandez MD      sodium chloride  75 mL/hr Intravenous Continuous Gutierrez Hernandez MD 75 mL/hr (02/23/22 2112)    tamsulosin  0 4 mg Oral HS Gutierrez Hernandez MD          Today, Patient Was Seen By: Enrique Vickers MD    ** Please Note: Dictation voice to text software may have been used in the creation of this document   **

## 2022-02-24 NOTE — ASSESSMENT & PLAN NOTE
· Patient has history of recurrent urothelial carcinoma bladder  · Status post recent TURBT 10/18/21  · Patient has ongoing rectal and penile pain  · Currently on radiation  · Urology input will be appreciated

## 2022-02-24 NOTE — ASSESSMENT & PLAN NOTE
Lab Results   Component Value Date    HGBA1C 7 7 (H) 09/03/2021       No results for input(s): POCGLU in the last 72 hours      Blood Sugar Average: Last 72 hrs:  ·  continue Lantus 18 units HS, will hold with insulin aspart as will be NPO  · Monitor Accu-Cheks, sliding scale for coverage

## 2022-02-24 NOTE — ASSESSMENT & PLAN NOTE
This is a 77-year-old male with history of bladder cancer currently on chemotherapy, CAD, CKD stage 4, diabetes mellitus, hypertension, hyperlipidemia presenting with ongoing rectal and penile pain for the past 2 weeks, difficulty urinating  Patient having dysuria, urinary urgency and frequency also went small amounts of blood in urine without any clots  Has been on Levaquin since 02/20  Was seen by urologist today and sent to the ED for evaluation      · CT revealed worsening of diffuse bladder wall thickening likely representing severe cystitis cannot exclude progression of tumor, moderate bilateral hydronephrosis cannot exclude right ureteral stent malfunction, mild wall thickening of rectum cannot exclude proctitis  · Urine culture 217 revealed Enterococcus faecalis  · Will start on ceftriaxone, follow-up cultures  · Urinary retention protocol  · Pain control, IV fluid  · Urology evaluation will be appreciated  · NPO past midnight for possible procedure by Urology

## 2022-02-25 ENCOUNTER — ANESTHESIA (INPATIENT)
Dept: RADIOLOGY | Facility: HOSPITAL | Age: 77
DRG: 690 | End: 2022-02-25
Payer: MEDICARE

## 2022-02-25 ENCOUNTER — APPOINTMENT (INPATIENT)
Dept: RADIOLOGY | Facility: HOSPITAL | Age: 77
DRG: 690 | End: 2022-02-25
Payer: MEDICARE

## 2022-02-25 ENCOUNTER — APPOINTMENT (OUTPATIENT)
Dept: RADIATION ONCOLOGY | Facility: HOSPITAL | Age: 77
End: 2022-02-25
Attending: INTERNAL MEDICINE
Payer: MEDICARE

## 2022-02-25 PROBLEM — R93.41 ABNORMAL CT SCAN, BLADDER: Status: ACTIVE | Noted: 2022-02-23

## 2022-02-25 LAB
ALBUMIN SERPL BCP-MCNC: 2.8 G/DL (ref 3.5–5)
ALP SERPL-CCNC: 211 U/L (ref 46–116)
ALT SERPL W P-5'-P-CCNC: 87 U/L (ref 12–78)
ANION GAP SERPL CALCULATED.3IONS-SCNC: 10 MMOL/L (ref 4–13)
AST SERPL W P-5'-P-CCNC: 62 U/L (ref 5–45)
BASOPHILS # BLD AUTO: 0.02 THOUSANDS/ΜL (ref 0–0.1)
BASOPHILS NFR BLD AUTO: 0 % (ref 0–1)
BILIRUB SERPL-MCNC: 0.22 MG/DL (ref 0.2–1)
BUN SERPL-MCNC: 59 MG/DL (ref 5–25)
CALCIUM ALBUM COR SERPL-MCNC: 9.3 MG/DL (ref 8.3–10.1)
CALCIUM SERPL-MCNC: 8.3 MG/DL (ref 8.3–10.1)
CHLORIDE SERPL-SCNC: 105 MMOL/L (ref 100–108)
CO2 SERPL-SCNC: 20 MMOL/L (ref 21–32)
CREAT SERPL-MCNC: 3.72 MG/DL (ref 0.6–1.3)
EOSINOPHIL # BLD AUTO: 0.15 THOUSAND/ΜL (ref 0–0.61)
EOSINOPHIL NFR BLD AUTO: 3 % (ref 0–6)
ERYTHROCYTE [DISTWIDTH] IN BLOOD BY AUTOMATED COUNT: 15.5 % (ref 11.6–15.1)
GFR SERPL CREATININE-BSD FRML MDRD: 14 ML/MIN/1.73SQ M
GLUCOSE SERPL-MCNC: 105 MG/DL (ref 65–140)
GLUCOSE SERPL-MCNC: 127 MG/DL (ref 65–140)
GLUCOSE SERPL-MCNC: 140 MG/DL (ref 65–140)
GLUCOSE SERPL-MCNC: 148 MG/DL (ref 65–140)
GLUCOSE SERPL-MCNC: 84 MG/DL (ref 65–140)
HCT VFR BLD AUTO: 28 % (ref 36.5–49.3)
HGB BLD-MCNC: 9.2 G/DL (ref 12–17)
IMM GRANULOCYTES # BLD AUTO: 0.04 THOUSAND/UL (ref 0–0.2)
IMM GRANULOCYTES NFR BLD AUTO: 1 % (ref 0–2)
LYMPHOCYTES # BLD AUTO: 0.73 THOUSANDS/ΜL (ref 0.6–4.47)
LYMPHOCYTES NFR BLD AUTO: 15 % (ref 14–44)
MCH RBC QN AUTO: 32.1 PG (ref 26.8–34.3)
MCHC RBC AUTO-ENTMCNC: 32.9 G/DL (ref 31.4–37.4)
MCV RBC AUTO: 98 FL (ref 82–98)
MONOCYTES # BLD AUTO: 0.62 THOUSAND/ΜL (ref 0.17–1.22)
MONOCYTES NFR BLD AUTO: 13 % (ref 4–12)
NEUTROPHILS # BLD AUTO: 3.3 THOUSANDS/ΜL (ref 1.85–7.62)
NEUTS SEG NFR BLD AUTO: 68 % (ref 43–75)
NRBC BLD AUTO-RTO: 0 /100 WBCS
PLATELET # BLD AUTO: 177 THOUSANDS/UL (ref 149–390)
PMV BLD AUTO: 10.4 FL (ref 8.9–12.7)
POTASSIUM SERPL-SCNC: 5.4 MMOL/L (ref 3.5–5.3)
PROT SERPL-MCNC: 6.8 G/DL (ref 6.4–8.2)
RBC # BLD AUTO: 2.87 MILLION/UL (ref 3.88–5.62)
SODIUM SERPL-SCNC: 135 MMOL/L (ref 136–145)
WBC # BLD AUTO: 4.86 THOUSAND/UL (ref 4.31–10.16)

## 2022-02-25 PROCEDURE — 99232 SBSQ HOSP IP/OBS MODERATE 35: CPT | Performed by: NURSE PRACTITIONER

## 2022-02-25 PROCEDURE — 85025 COMPLETE CBC W/AUTO DIFF WBC: CPT | Performed by: HOSPITALIST

## 2022-02-25 PROCEDURE — 82948 REAGENT STRIP/BLOOD GLUCOSE: CPT

## 2022-02-25 PROCEDURE — C1769 GUIDE WIRE: HCPCS

## 2022-02-25 PROCEDURE — 99232 SBSQ HOSP IP/OBS MODERATE 35: CPT | Performed by: INTERNAL MEDICINE

## 2022-02-25 PROCEDURE — C1894 INTRO/SHEATH, NON-LASER: HCPCS

## 2022-02-25 PROCEDURE — 80053 COMPREHEN METABOLIC PANEL: CPT | Performed by: HOSPITALIST

## 2022-02-25 PROCEDURE — 0T9430Z DRAINAGE OF LEFT KIDNEY PELVIS WITH DRAINAGE DEVICE, PERCUTANEOUS APPROACH: ICD-10-PCS | Performed by: RADIOLOGY

## 2022-02-25 PROCEDURE — 87186 SC STD MICRODIL/AGAR DIL: CPT | Performed by: RADIOLOGY

## 2022-02-25 PROCEDURE — 76937 US GUIDE VASCULAR ACCESS: CPT

## 2022-02-25 PROCEDURE — 50432 PLMT NEPHROSTOMY CATHETER: CPT | Performed by: RADIOLOGY

## 2022-02-25 PROCEDURE — 87086 URINE CULTURE/COLONY COUNT: CPT | Performed by: RADIOLOGY

## 2022-02-25 PROCEDURE — 87077 CULTURE AEROBIC IDENTIFY: CPT | Performed by: RADIOLOGY

## 2022-02-25 PROCEDURE — C1729 CATH, DRAINAGE: HCPCS

## 2022-02-25 PROCEDURE — 0T9330Z DRAINAGE OF RIGHT KIDNEY PELVIS WITH DRAINAGE DEVICE, PERCUTANEOUS APPROACH: ICD-10-PCS | Performed by: RADIOLOGY

## 2022-02-25 PROCEDURE — 50432 PLMT NEPHROSTOMY CATHETER: CPT

## 2022-02-25 RX ORDER — LIDOCAINE WITH 8.4% SOD BICARB 0.9%(10ML)
SYRINGE (ML) INJECTION CODE/TRAUMA/SEDATION MEDICATION
Status: COMPLETED | OUTPATIENT
Start: 2022-02-25 | End: 2022-02-25

## 2022-02-25 RX ORDER — FENTANYL CITRATE 50 UG/ML
INJECTION, SOLUTION INTRAMUSCULAR; INTRAVENOUS AS NEEDED
Status: DISCONTINUED | OUTPATIENT
Start: 2022-02-25 | End: 2022-02-25

## 2022-02-25 RX ORDER — FENTANYL CITRATE/PF 50 MCG/ML
25 SYRINGE (ML) INJECTION
Status: DISCONTINUED | OUTPATIENT
Start: 2022-02-25 | End: 2022-02-25 | Stop reason: HOSPADM

## 2022-02-25 RX ORDER — SODIUM CHLORIDE 9 MG/ML
10 INJECTION INTRAVENOUS DAILY
Qty: 900 ML | Refills: 0 | Status: SHIPPED | OUTPATIENT
Start: 2022-02-25 | End: 2022-05-26

## 2022-02-25 RX ORDER — ONDANSETRON 2 MG/ML
INJECTION INTRAMUSCULAR; INTRAVENOUS AS NEEDED
Status: DISCONTINUED | OUTPATIENT
Start: 2022-02-25 | End: 2022-02-25

## 2022-02-25 RX ORDER — PROPOFOL 10 MG/ML
INJECTION, EMULSION INTRAVENOUS AS NEEDED
Status: DISCONTINUED | OUTPATIENT
Start: 2022-02-25 | End: 2022-02-25

## 2022-02-25 RX ORDER — ROCURONIUM BROMIDE 10 MG/ML
INJECTION, SOLUTION INTRAVENOUS AS NEEDED
Status: DISCONTINUED | OUTPATIENT
Start: 2022-02-25 | End: 2022-02-25

## 2022-02-25 RX ORDER — ONDANSETRON 2 MG/ML
4 INJECTION INTRAMUSCULAR; INTRAVENOUS ONCE AS NEEDED
Status: DISCONTINUED | OUTPATIENT
Start: 2022-02-25 | End: 2022-02-25 | Stop reason: HOSPADM

## 2022-02-25 RX ADMIN — HYDROMORPHONE HYDROCHLORIDE 1 MG: 1 INJECTION, SOLUTION INTRAMUSCULAR; INTRAVENOUS; SUBCUTANEOUS at 06:37

## 2022-02-25 RX ADMIN — PROPOFOL 150 MG: 10 INJECTION, EMULSION INTRAVENOUS at 10:51

## 2022-02-25 RX ADMIN — OXYBUTYNIN CHLORIDE 5 MG: 5 TABLET ORAL at 21:40

## 2022-02-25 RX ADMIN — PHENYLEPHRINE HYDROCHLORIDE 50 MCG/MIN: 10 INJECTION INTRAVENOUS at 11:13

## 2022-02-25 RX ADMIN — METOPROLOL SUCCINATE 100 MG: 50 TABLET, EXTENDED RELEASE ORAL at 08:21

## 2022-02-25 RX ADMIN — OXYBUTYNIN CHLORIDE 5 MG: 5 TABLET ORAL at 08:21

## 2022-02-25 RX ADMIN — HEPARIN SODIUM 5000 UNITS: 5000 INJECTION INTRAVENOUS; SUBCUTANEOUS at 15:23

## 2022-02-25 RX ADMIN — FENTANYL CITRATE 100 MCG: 50 INJECTION INTRAMUSCULAR; INTRAVENOUS at 10:51

## 2022-02-25 RX ADMIN — DULOXETINE HYDROCHLORIDE 20 MG: 20 CAPSULE, DELAYED RELEASE PELLETS ORAL at 08:22

## 2022-02-25 RX ADMIN — Medication 10 ML: at 11:18

## 2022-02-25 RX ADMIN — EZETIMIBE 10 MG: 10 TABLET ORAL at 06:35

## 2022-02-25 RX ADMIN — CALCITRIOL CAPSULES 0.25 MCG 0.25 MCG: 0.25 CAPSULE ORAL at 08:22

## 2022-02-25 RX ADMIN — HYDROMORPHONE HYDROCHLORIDE 1 MG: 1 INJECTION, SOLUTION INTRAMUSCULAR; INTRAVENOUS; SUBCUTANEOUS at 15:22

## 2022-02-25 RX ADMIN — HYDROMORPHONE HYDROCHLORIDE 1 MG: 1 INJECTION, SOLUTION INTRAMUSCULAR; INTRAVENOUS; SUBCUTANEOUS at 01:16

## 2022-02-25 RX ADMIN — PANTOPRAZOLE SODIUM 40 MG: 40 TABLET, DELAYED RELEASE ORAL at 06:50

## 2022-02-25 RX ADMIN — Medication 10 ML: at 11:40

## 2022-02-25 RX ADMIN — CEFTRIAXONE SODIUM 1000 MG: 10 INJECTION, POWDER, FOR SOLUTION INTRAVENOUS at 08:20

## 2022-02-25 RX ADMIN — ROCURONIUM BROMIDE 30 MG: 50 INJECTION, SOLUTION INTRAVENOUS at 11:20

## 2022-02-25 RX ADMIN — ISOSORBIDE MONONITRATE 30 MG: 30 TABLET, EXTENDED RELEASE ORAL at 06:35

## 2022-02-25 RX ADMIN — ROCURONIUM BROMIDE 20 MG: 50 INJECTION, SOLUTION INTRAVENOUS at 10:51

## 2022-02-25 RX ADMIN — SERTRALINE HYDROCHLORIDE 50 MG: 50 TABLET ORAL at 08:21

## 2022-02-25 RX ADMIN — TAMSULOSIN HYDROCHLORIDE 0.4 MG: 0.4 CAPSULE ORAL at 21:40

## 2022-02-25 RX ADMIN — GABAPENTIN 300 MG: 300 CAPSULE ORAL at 21:40

## 2022-02-25 RX ADMIN — IOHEXOL 20 ML: 350 INJECTION, SOLUTION INTRAVENOUS at 12:00

## 2022-02-25 RX ADMIN — BIMATOPROST 1 DROP: 0.1 SOLUTION/ DROPS OPHTHALMIC at 21:41

## 2022-02-25 RX ADMIN — ONDANSETRON 4 MG: 2 INJECTION INTRAMUSCULAR; INTRAVENOUS at 11:59

## 2022-02-25 RX ADMIN — OXYBUTYNIN CHLORIDE 5 MG: 5 TABLET ORAL at 15:23

## 2022-02-25 RX ADMIN — HYDROMORPHONE HYDROCHLORIDE 1 MG: 1 INJECTION, SOLUTION INTRAMUSCULAR; INTRAVENOUS; SUBCUTANEOUS at 18:22

## 2022-02-25 RX ADMIN — INSULIN GLARGINE 18 UNITS: 100 INJECTION, SOLUTION SUBCUTANEOUS at 21:40

## 2022-02-25 RX ADMIN — SODIUM CHLORIDE 75 ML/HR: 9 INJECTION, SOLUTION INTRAVENOUS at 18:20

## 2022-02-25 RX ADMIN — HYDROMORPHONE HYDROCHLORIDE 1 MG: 1 INJECTION, SOLUTION INTRAMUSCULAR; INTRAVENOUS; SUBCUTANEOUS at 09:49

## 2022-02-25 RX ADMIN — HEPARIN SODIUM 5000 UNITS: 5000 INJECTION INTRAVENOUS; SUBCUTANEOUS at 21:40

## 2022-02-25 RX ADMIN — HYDROMORPHONE HYDROCHLORIDE 0.5 MG: 1 INJECTION, SOLUTION INTRAMUSCULAR; INTRAVENOUS; SUBCUTANEOUS at 00:05

## 2022-02-25 NOTE — ASSESSMENT & PLAN NOTE
· Stage II bladder carcinoma followed by Urology    · Status post recent TURBT 10/18/21  · Previously treated with BCG instillation  · Outpatient follow-up with urology for further treatment

## 2022-02-25 NOTE — ASSESSMENT & PLAN NOTE
· GARY on CKD 4 secondary to obstructive uropathy  · Status post percutaneous nephrostomy tube placement today  · Pain control is adequate  · Follow-up with urology for eventual stent removal  · Continue IV fluids  Repeat labs in a m

## 2022-02-25 NOTE — INTERVAL H&P NOTE
Update: (This section must be completed if the H&P was completed greater than 24 hrs to procedure or admission)    H&P reviewed  After examining the patient, I find no changed to the H&P since it had been written  77M w/ recurrent bladder cancer, urothelial carcinoma currently undergoing radiation and chemotherapy  Has a right-sided stent  Now admitted with bilateral right greater than left hydronephrosis and elevated creatinine  I personally reviewed the imaging  This is suspicious for right-sided stent malfunction, additionally with probable left hydronephrosis related to distal obstruction  Nephrostomy tube placement is indicated  Bilateral   We appreciate anesthesia support  Patient on broad-spectrum antibiotics that cover prior rohini, ceftriaxone  Discussed risks benefits and alternatives with the patient including risk of bleeding, damage to the kidney, complications requiring embolization or surgery, worsening infection or sepsis  He wishes to proceed and consent obtained  The stent will need to be removed after further planning with urology        Patient re-evaluated   Accept as history and physical     Everett Wayne MD/February 25, 2022/10:51 AM

## 2022-02-25 NOTE — PLAN OF CARE
Problem: PAIN - ADULT  Goal: Verbalizes/displays adequate comfort level or baseline comfort level  Description: Interventions:  - Encourage patient to monitor pain and request assistance  - Assess pain using appropriate pain scale  - Administer analgesics based on type and severity of pain and evaluate response  - Implement non-pharmacological measures as appropriate and evaluate response  - Consider cultural and social influences on pain and pain management  - Notify physician/advanced practitioner if interventions unsuccessful or patient reports new pain  Outcome: Progressing     Problem: INFECTION - ADULT  Goal: Absence or prevention of progression during hospitalization  Description: INTERVENTIONS:  - Assess and monitor for signs and symptoms of infection  - Monitor lab/diagnostic results  - Monitor all insertion sites, i e  indwelling lines, tubes, and drains  - Monitor endotracheal if appropriate and nasal secretions for changes in amount and color  - [place] appropriate cooling/warming therapies per order  - Administer medications as ordered  - Instruct and encourage patient and family to use good hand hygiene technique  - Identify and instruct in appropriate isolation precautions for identified infection/condition  Outcome: Progressing  Goal: Absence of fever/infection during neutropenic period  Description: INTERVENTIONS:  - Monitor WBC    Outcome: Progressing     Problem: SAFETY ADULT  Goal: Patient will remain free of falls  Description: INTERVENTIONS:  - Educate patient/family on patient safety including physical limitations  - Instruct patient to call for assistance with activity   - Consult OT/PT to assist with strengthening/mobility   - Keep Call bell within reach  - Keep bed low and locked with side rails adjusted as appropriate  - Keep care items and personal belongings within reach  - Initiate and maintain comfort rounds  - Make Fall Risk Sign visible to staff  - Offer Toileting every  Hours, in advance of need  - Initiate/Maintain alarm  - Obtain necessary fall risk management equipment:   - Apply yellow socks and bracelet for high fall risk patients  - Consider moving patient to room near nurses station  Outcome: Progressing  Goal: Maintain or return to baseline ADL function  Description: INTERVENTIONS:  -  Assess patient's ability to carry out ADLs; assess patient's baseline for ADL function and identify physical deficits which impact ability to perform ADLs (bathing, care of mouth/teeth, toileting, grooming, dressing, etc )  - Assess/evaluate cause of self-care deficits   - Assess range of motion  - Assess patient's mobility; develop plan if impaired  - Assess patient's need for assistive devices and provide as appropriate  - Encourage maximum independence but intervene and supervise when necessary  - Involve family in performance of ADLs  - Assess for home care needs following discharge   - Consider OT consult to assist with ADL evaluation and planning for discharge  - Provide patient education as appropriate  Outcome: Progressing  Goal: Maintains/Returns to pre admission functional level  Description: INTERVENTIONS:  - Perform BMAT or MOVE assessment daily    - Set and communicate daily mobility goal to care team and patient/family/caregiver  - Collaborate with rehabilitation services on mobility goals if consulted  - Perform Range of Motion  times a day  - Reposition patient every  hours    - Dangle patient  times a day  - Stand patient  times a day  - Ambulate patient  times a day  - Out of bed to chair  times a day   - Out of bed for meals  times a day  - Out of bed for toileting  - Record patient progress and toleration of activity level   Outcome: Progressing     Problem: DISCHARGE PLANNING  Goal: Discharge to home or other facility with appropriate resources  Description: INTERVENTIONS:  - Identify barriers to discharge w/patient and caregiver  - Arrange for needed discharge resources and transportation as appropriate  - Identify discharge learning needs (meds, wound care, etc )  - Arrange for interpretive services to assist at discharge as needed  - Refer to Case Management Department for coordinating discharge planning if the patient needs post-hospital services based on physician/advanced practitioner order or complex needs related to functional status, cognitive ability, or social support system  Outcome: Progressing     Problem: Knowledge Deficit  Goal: Patient/family/caregiver demonstrates understanding of disease process, treatment plan, medications, and discharge instructions  Description: Complete learning assessment and assess knowledge base    Interventions:  - Provide teaching at level of understanding  - Provide teaching via preferred learning methods  Outcome: Progressing     Problem: DISCHARGE PLANNING - CARE MANAGEMENT  Goal: Discharge to post-acute care or home with appropriate resources  Description: INTERVENTIONS:  - Conduct assessment to determine patient/family and health care team treatment goals, and need for post-acute services based on payer coverage, community resources, and patient preferences, and barriers to discharge  - Address psychosocial, clinical, and financial barriers to discharge as identified in assessment in conjunction with the patient/family and health care team  - Arrange appropriate level of post-acute services according to patients   needs and preference and payer coverage in collaboration with the physician and health care team  - Communicate with and update the patient/family, physician, and health care team regarding progress on the discharge plan  - Arrange appropriate transportation to post-acute venues  Outcome: Progressing     Problem: Potential for Falls  Goal: Patient will remain free of falls  Description: INTERVENTIONS:  - Educate patient/family on patient safety including physical limitations  - Instruct patient to call for assistance with activity   - Consult OT/PT to assist with strengthening/mobility   - Keep Call bell within reach  - Keep bed low and locked with side rails adjusted as appropriate  - Keep care items and personal belongings within reach  - Initiate and maintain comfort rounds  - Make Fall Risk Sign visible to staff  - Offer Toileting every  Hours, in advance of need  - Initiate/Maintain alarm  - Obtain necessary fall risk management equipment:  - Apply yellow socks and bracelet for high fall risk patients  - Consider moving patient to room near nurses station  Outcome: Progressing     Problem: Prexisting or High Potential for Compromised Skin Integrity  Goal: Skin integrity is maintained or improved  Description: INTERVENTIONS:  - Identify patients at risk for skin breakdown  - Assess and monitor skin integrity  - Assess and monitor nutrition and hydration status  - Monitor labs   - Assess for incontinence   - Turn and reposition patient  - Assist with mobility/ambulation  - Relieve pressure over bony prominences  - Avoid friction and shearing  - Provide appropriate hygiene as needed including keeping skin clean and dry  - Evaluate need for skin moisturizer/barrier cream  - Collaborate with interdisciplinary team   - Patient/family teaching  - Consider wound care consult   Outcome: Progressing     Problem: GENITOURINARY - ADULT  Goal: Maintains or returns to baseline urinary function  Description: INTERVENTIONS:  - Assess urinary function  - Encourage oral fluids to ensure adequate hydration if ordered  - Administer IV fluids as ordered to ensure adequate hydration  - Administer ordered medications as needed  - Offer frequent toileting  - Follow urinary retention protocol if ordered  Outcome: Progressing  Goal: Absence of urinary retention  Description: INTERVENTIONS:  - Assess patients ability to void and empty bladder  - Monitor I/O  - Bladder scan as needed  - Discuss with physician/AP medications to alleviate retention as needed  - Discuss catheterization for long term situations as appropriate  Outcome: Progressing  Goal: Urinary catheter remains patent  Description: INTERVENTIONS:  - Assess patency of urinary catheter  - If patient has a chronic wagner, consider changing catheter if non-functioning  - Follow guidelines for intermittent irrigation of non-functioning urinary catheter  Outcome: Progressing

## 2022-02-25 NOTE — ASSESSMENT & PLAN NOTE
· CT with worsening diffuse bladder wall thickening, could be cystitis versus progress and of tumor  · Urine culture on 02/17/2022 showed growth of Enterococcus faecalis  · Urine culture on admission was negative for growth  · Continue ceftriaxone for now    Follow-up intraoperative cultures  · Urology follow-up

## 2022-02-25 NOTE — NURSING NOTE
Patient returned back to unit from PACU, report received from PACU RN  Patient currently resting comfortably in bed  Will continue to monitor

## 2022-02-25 NOTE — PROGRESS NOTES
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  · Currently undergoing radiation and chemotherapy  · Retained right ureteral stent from 10/24/21  · Moderate bilateral hydronephrosis likely secondary to related bladder pathology  Cannot exclude right ureteral stent malfunction  · Creatinine 4 04--3 87--3 7   Recent baseline around 3 1-3 5  · Case was discussed with  Dr Lura Holstein  Given GARY, bilateral flank pain, and indwelling right ureteral stent, recommend bilateral PCNs to be placed tomorrow with IR for further decompression and pain management  · IR consulted   · Bbilateral PCN placement today  · If patient does well with PCNs, we may remove right ureteral stent early next week  * UTI (urinary tract infection)  Assessment & Plan  · Urine culture 2/17 with 70-79K of Enterococcus faecalis  · Levaquin 500 mg po daily x 7 days sent to pharmacy on 2/20  · On CT in ED, marked worsening of diffuse bladder wall thickening most likely representing severe cystitis  Cannot rule out progression of bladder tumor  · Blood cultures no growth at 24 hours   · Empirically on IV Rocephin  Primary team to narrow per sensitivities  Subjective:   HPI: Patient awaiting placement of BL PCNs this am   Patient continues to complain of penile and rectal pain with voiding which has been ongoing for over two weeks  States it feels like spasms  Denies abd pain or flank pain  Objective:  Nursing Rounds: updated RN  Vitals: Blood pressure 116/70, pulse 62, temperature (!) 97 4 °F (36 3 °C), resp  rate 18, weight 104 kg (229 lb 4 5 oz), SpO2 99 %  ,Body mass index is 30 67 kg/m²  Physical Exam  Vitals reviewed  Constitutional:       General: He is in acute distress        Appearance: Normal appearance  He is obese  He is not ill-appearing, toxic-appearing or diaphoretic  HENT:      Head: Normocephalic and atraumatic  Cardiovascular:      Rate and Rhythm: Normal rate and regular rhythm  Heart sounds: Normal heart sounds  Pulmonary:      Effort: Pulmonary effort is normal  No respiratory distress  Breath sounds: Normal breath sounds  Abdominal:      General: Bowel sounds are normal       Palpations: Abdomen is soft  Tenderness: There is no abdominal tenderness  There is no right CVA tenderness, left CVA tenderness or rebound  Genitourinary:     Penis: Normal        Testes: Normal    Musculoskeletal:         General: Normal range of motion  Cervical back: Normal range of motion  Skin:     General: Skin is warm and dry  Neurological:      General: No focal deficit present  Mental Status: He is alert and oriented to person, place, and time  Psychiatric:         Mood and Affect: Mood normal          Behavior: Behavior normal          Thought Content:  Thought content normal          Judgment: Judgment normal          Labs:  Recent Labs     02/23/22  1521 02/24/22  0641 02/25/22  0429   WBC 7 19 5 94 4 86       Recent Labs     02/23/22  1521 02/24/22  0641 02/25/22  0429   HGB 10 4* 9 7* 9 2*     Recent Labs     02/23/22  1521 02/24/22  0641 02/25/22  0429   HCT 30 0* 27 9* 28 0*     Recent Labs     02/23/22  1521 02/24/22  0641 02/25/22  0429   CREATININE 4 04* 3 87* 3 72*           History:    Past Medical History:   Diagnosis Date    Anemia     Last assessed: 9/28/17    Anxiety     Arteriosclerotic cardiovascular disease     Last assessed: 9/28/17    Arthritis     Bladder cancer ([facility] [address] )     bladder- had BCG treatments    Chronic kidney disease     Stage IV    CKD (chronic kidney disease) stage 4, GFR 15-29 ml/min (HCC)     Colon polyp     Coronary artery disease     7 stents    Depression     Diabetes mellitus (HCC)     IDDM    GERD (gastroesophageal reflux disease)     Glaucoma     Hematuria     History of fusion of cervical spine     Hyperlipidemia     Hypertension     Insomnia     Last assessed: 12    Loss of hearing     has hearing aids but usually does not wear them    Other seasonal allergic rhinitis     Last assessed: 2/10/16    [facility]peripheral artery disease[facility])     Shortness of breath     on exertion    Spinal stenosis of lumbar region     Transient cerebral ischemia     No Residual    Uses walker     w/c for longer distances     Social History     Socioeconomic History    Marital status: /Civil Union     Spouse name: None    Number of children: None    Years of education: None    Highest education level: None   Occupational History    None   Tobacco Use    Smoking status: Former Smoker     Packs/day: 3 00     Years: 27 00     Pack years: 81 00     Types: Cigarettes     Quit date:      Years since quittin 1    Smokeless tobacco: Never Used   Vaping Use    Vaping Use: Never used   Substance and Sexual Activity    Alcohol use: Not Currently     Comment: beer / liquor    Drug use: Not Currently     Types: Marijuana     Comment: quit 2019 had medical marijuana    Sexual activity: Not Currently   Other Topics Concern    None   Social History Narrative    Consumes 1 cup of coffee and 1 soda per day     Social Determinants of Health     Financial Resource Strain: Not on file   Food Insecurity: Not on file   Transportation Needs: Not on file   Physical Activity: Not on file   Stress: Not on file   Social Connections: Not on file   Intimate Partner Violence: Not on file   Housing Stability: Not on file     Past Surgical History:   Procedure Laterality Date    CARDIAC SURGERY      Cath stent placement  Last assessed: 3/9/17  Interventional Catheterization    CHOLECYSTECTOMY      COLONOSCOPY      CYSTOSCOPY      Diagnostic w/biopsy  Glee Form   Last assessed: 14    CYSTOSCOPY W/ URETERAL STENT PLACEMENT Bilateral 10/18/2021    Procedure: bilateral retrogrades, cytology collection;  Surgeon: Lynn Yepez MD;  [facility];  Service: Urology    CYSTOURETHROSCOPY      w/cautercollette Ayers    FL RETROGRADE PYELOGRAM  10/18/2021    FL RETROGRADE PYELOGRAM  10/24/2021    GASTRIC BYPASS      For morbid obesity w/Shaji-en-Y   Resolved: 11/17/09    INCISION AND DRAINAGE OF WOUND Right 2/26/2017    Procedure: INCISION AND DRAINAGE (I&D) EXTREMITY WITH APPLICATION OF GRAFT JACKET;  Surgeon: Osman Byrne DPM;  [facility];  Service:     INCISION AND DRAINAGE OF WOUND Right 4/25/2017    Procedure: INCISION AND DRAINAGE (I&D) EXTREMITY, APPLICATION OF GRAFT;  Surgeon: Osman Byrne DPM;  [facility];  Service:     IR BIOPSY OTHER  7/2/2020    IR LOWER EXTREMITY ANGIOGRAM  2/8/2021    IR LOWER EXTREMITY ANGIOGRAM  2/11/2021    IR PORT PLACEMENT  1/17/2022    IR TUNNELED CENTRAL LINE PLACEMENT  12/24/2020    JOINT REPLACEMENT      christofer knees replaced    WV AMPUTATION METATARSAL+TOE,SINGLE Left 12/21/2020    Procedure: RAY RESECTION FOOT;  Surgeon: Arely Carolina DPM;  [facility];  Service: Podiatry    WV AMPUTATION METATARSAL+TOE,SINGLE Left 12/31/2020    Procedure: 5TH MET RESECTION;  Surgeon: Arely Carolina DPM;  [facility];  Service: Podiatry    WV CYSTOURETHROSCOPY W/IRRIG & EVAC CLOTS N/A 2/10/2021    Procedure: CYSTOSCOPY EVACUATION OF CLOTS, fulguration;  Surgeon: Zohaib Chauhan MD;  [facility];  Service: Urology    WV CYSTOURETHROSCOPY W/IRRIG & EVAC CLOTS N/A 10/24/2021    Procedure: CYSTOSCOPY EVACUATION OF CLOT, fulguration of bleeding vessels, right ureter stent placement, retrograde pyelogram;  Surgeon: Tiffanie Goldberg MD;  [facility];  Service: Urology    WV CYSTOURETHROSCOPY,BIOPSY N/A 8/16/2016    Procedure: CYSTOSCOPY WITH BIOPSIES;  Surgeon: Jacky Henning MD;  [facility];  Service: Urology  MT CYSTOURETHROSCOPY,FULGUR <0 5 CM LESN N/A 11/19/2020    Procedure: CYSTO W/BIOPSIES, transurethral prostate bx;  Surgeon: Karine Stevens MD;  [facility];  Service: Urology    MT CYSTOURETHROSCOPY,FULGUR >5 CM LESN Bilateral 10/18/2021    Procedure: TRANSURETHRAL RESECTION OF BLADDER TUMOR (TURBT);   Surgeon: Roderick Wells MD;  [facility];  Service: Urology    MT Garcia Lomeli 3RD+ ORD [address] PEL/LXTR [facility] Left 2/8/2021    Procedure: LEG angiogram, CO2 w/limited contrast with balloon angioplasty postertior tibial artery;  Surgeon: Sandi Donovan MD;  [facility];  Service: Vascular    ROTATOR CUFF REPAIR      SMALL INTESTINE SURGERY      Surgery Shaji-en-Y    SPINAL FUSION      lumbar and cervical fusions    VAC DRESSING APPLICATION Right 5/37/3458    Procedure: APPLICATION VAC DRESSING;  Surgeon: Ophelia Mars DPM;  [facility];  Service:     WOUND DEBRIDEMENT Left 2/16/2021    Procedure: FOOT DEBRIDE, [address] OUT w/graft application;  Surgeon: Ophelia Mars DPM;  [facility];  Service: Podiatry     Family History   Problem Relation Age of Onset    Diabetes Mother     Heart disease Mother     Other Mother         High blood pressure    Heart disease Father     Diabetes Sister     Other Sister         High blood pressure    Kidney disease Sister     Heart disease Brother     Other Brother         High blood pressure       EVANGELINA Acuña Do  Date: 2/25/2022 Time: 9:19 AM

## 2022-02-25 NOTE — BRIEF OP NOTE (RAD/CATH)
INTERVENTIONAL RADIOLOGY PROCEDURE NOTE    Date: 2/25/2022    Procedure: IR NEPHROSTOMY TUBE PLACEMENT BILATERAL    Preoperative diagnosis:   1  Cystitis    2  Bilateral hydronephrosis    3  Proctitis    4  Bladder cancer ([facility] [address] )    5  GARY (acute kidney injury) ([facility] )         Postoperative diagnosis: Same  Surgeon: Aaron Parkinson MD     Assistant: None  No qualified resident was available  Blood loss: 0    Specimens:   Urine culture left  Urine culture right     Findings: Moderate right hydronephrosis 10 [place] nephrostomy tube placed  A right ureteral stent is in place    Mild left hydronephrosis 10 [place] nephrostomy tube placed    Blood tinged urine, without obvious purulence or suspicion for infection bilaterally    Complications: None immediate      Anesthesia: general

## 2022-02-25 NOTE — PROGRESS NOTES
[address] [facility]  Progress Note - [facility] 1945, 76 y o  male MRN: [id number]  Unit/Bed#: E2 -01 Encounter: [id number]  Primary Care Provider: Carmen Goode MD   Date and time admitted to hospital: 2/23/2022  2:50 PM    * Abnormal CT scan, bladder  Assessment & Plan  · CT with worsening diffuse bladder wall thickening, could be cystitis versus progress and of tumor  · Urine culture on 02/17/2022 showed growth of Enterococcus faecalis  · Urine culture on admission was negative for growth  · Continue ceftriaxone for now  Follow-up intraoperative cultures  · Urology follow-up    Acute kidney injury superimposed on CKD [facility])  Assessment & Plan  · GARY on CKD 4 secondary to obstructive uropathy  · Status post percutaneous nephrostomy tube placement today  · Pain control is adequate  · Follow-up with urology for eventual stent removal  · Continue IV fluids  Repeat labs in a m  Type 2 diabetes mellitus, with long-term current use of insulin [facility])  Assessment & Plan  Lab Results   Component Value Date    HGBA1C 7 7 (H) 09/03/2021       Recent Labs     02/24/22  2100 02/25/22  0620 02/25/22  1240 02/25/22  1614   POCGLU 210* 148* 127 105     Continue Lantus with sliding scale insulin    Bladder carcinoma (HCC)  Assessment & Plan  · Stage II bladder carcinoma followed by Urology    · Status post recent TURBT 10/18/21  · Previously treated with BCG instillation  · Outpatient follow-up with urology for further treatment    Hypertension with renal disease  Assessment & Plan  · Continue metoprolol 100 mg daily    Hyperlipidemia  Assessment & Plan  · Continue Zetia daily    Coronary artery disease of native artery of native heart with stable angina pectoris (HCC)  Assessment & Plan  · Stable on baby aspirin and Toprol  mg daily      VTE Pharmacologic Prophylaxis:   Pharmacologic: Heparin  Mechanical VTE Prophylaxis in Place: No    Patient Centered Rounds:  Discussed with his nurse    Discussions with Specialists or Other Care Team Provider:  Hospital course reviewed    Time Spent for Care: 20 minutes  More than 50% of total time spent on counseling and coordination of care as described above  Current Length of Stay: 2 day(s)    Current Patient Status: Inpatient   Certification Statement: The patient will continue to require additional inpatient hospital stay due to Percutaneous nephrostomy    Discharge Plan:  In 24-48 hours    Code Status: Level 1 - Full Code      Subjective:   Status post percutaneous nephrostomy  Patient was tired and sleepy  Mild pain as expected    Objective:     Vitals:   Temp (24hrs), Av 3 °F (36 3 °C), Min:96 6 °F (35 9 °C), Max:98 6 °F (37 °C)    Temp:  [96 6 °F (35 9 °C)-98 6 °F (37 °C)] 96 8 °F (36 °C)  HR:  [62-83] 71  Resp:  [12-18] 15  BP: (116-156)/(63-96) 131/73  SpO2:  [93 %-99 %] 99 %  Body mass index is 30 67 kg/m²  Input and Output Summary (last 24 hours): Intake/Output Summary (Last 24 hours) at 2022 1721  Last data filed at 2022 1528  Gross per 24 hour   Intake 610 ml   Output 658 ml   Net -48 ml       Physical Exam:     Physical Exam  Constitutional:       Appearance: He is not ill-appearing or diaphoretic  HENT:      Head: Normocephalic and atraumatic  Nose: No rhinorrhea  Eyes:      General: No scleral icterus  Cardiovascular:      Rate and Rhythm: Regular rhythm  Heart sounds: No murmur heard  No gallop  Pulmonary:      Effort: Pulmonary effort is normal  No respiratory distress  Breath sounds: No wheezing or rales  Abdominal:      General: Abdomen is flat  There is no distension  Palpations: Abdomen is soft  Musculoskeletal:      Cervical back: Neck supple  Right lower leg: No edema  Left lower leg: No edema  Skin:     General: Skin is warm and dry  Neurological:      Mental Status: He is alert and oriented to person, place, and time     Psychiatric:         Mood and Affect: Mood normal          Behavior: Behavior normal          Additional Data:     Labs:    Results from last 7 days   Lab Units 02/25/22  0429   WBC Thousand/uL 4 86   HEMOGLOBIN g/dL 9 2*   HEMATOCRIT % 28 0*   PLATELETS Thousands/uL 177   NEUTROS PCT % 68   LYMPHS PCT % 15   MONOS PCT % 13*   EOS PCT % 3     Results from last 7 days   Lab Units 02/25/22  0429   SODIUM mmol/L 135*   POTASSIUM mmol/L 5 4*   CHLORIDE mmol/L 105   CO2 mmol/L 20*   BUN mg/dL 59*   CREATININE mg/dL 3 72*   ANION GAP mmol/L 10   CALCIUM mg/dL 8 3   ALBUMIN g/dL 2 8*   TOTAL BILIRUBIN mg/dL 0 22   ALK PHOS U/L 211*   ALT U/L 87*   AST U/L 62*   GLUCOSE RANDOM mg/dL 140     Results from last 7 days   Lab Units 02/23/22  1521   INR  1 06     Results from last 7 days   Lab Units 02/25/22  1614 02/25/22  1240 02/25/22  0620 02/24/22  2100 02/24/22  1558 02/24/22  1117 02/24/22  0813 02/23/22  2203   POC GLUCOSE mg/dl 105 127 148* 210* 146* 170* 172* 227*         Results from last 7 days   Lab Units 02/23/22  1818 02/23/22  1521   LACTIC ACID mmol/L  --  1 1   PROCALCITONIN ng/ml 0 53* 0 53*           * I Have Reviewed All Lab Data Listed Above  * Additional Pertinent Lab Tests Reviewed: All Labs Within Last 24 Hours Reviewed    Imaging:    Imaging Reports Reviewed Today Include: ir       Recent Cultures (last 7 days):     Results from last 7 days   Lab Units 02/23/22  1543 02/23/22  1521   BLOOD CULTURE   --  No Growth at 24 hrs  No Growth at 24 hrs     URINE CULTURE  No Growth <1000 cfu/mL  --        Last 24 Hours Medication List:   Current Facility-Administered Medications   Medication Dose Route Frequency Provider Last Rate    acetaminophen  650 mg Oral Q6H PRN Ariana Chávez MD      ALPRAZolam  0 25 mg Oral BID PRN Ariana Chávez MD      aspirin  81 mg Oral Daily Ariana Chávez MD      bimatoprost  1 drop Both Eyes HS Ariana Chávez MD      calcitriol  0 25 mcg Oral Daily Ariana Chávez MD      cefTRIAXone  1,000 mg Intravenous Q24H Aster Johnson MD 1,000 mg (02/25/22 0820)    docusate sodium  100 mg Oral BID PRN Aster Johnson MD      DULoxetine  20 mg Oral Daily Aster Johnson MD      ezetimibe  10 mg Oral Early Morning Aster Johnson MD      gabapentin  300 mg Oral HS Aster Johnson MD      heparin (porcine)  5,000 Units Subcutaneous [facility] Aster Johnson MD      HYDROcodone-acetaminophen  1 tablet Oral Q6H PRN Aster Johnson MD      HYDROmorphone  1 mg Intravenous Q3H PRN Rhea Mohan PA-C      insulin glargine  18 Units Subcutaneous HS Aster Johnson MD      insulin lispro  1-5 Units Subcutaneous TID [facility] Aster Johnson MD      isosorbide mononitrate  30 mg Oral Early Morning Aster Johnson MD      metoprolol succinate  100 mg Oral Daily Aster Johnson MD      ondansetron  4 mg Intravenous Q6H PRN Aster Johnson MD      oxybutynin  5 mg Oral TID Aster Johnson MD      pantoprazole  40 mg Oral Early Morning Aster Johnson MD      sertraline  50 mg Oral Daily Aster Johnson MD      sodium chloride (PF)  3 mL Intravenous Q1H PRN Aster Johnson MD      sodium chloride  75 mL/hr Intravenous Continuous Aster Johnson MD 75 mL/hr (02/25/22 1220)    tamsulosin  0 4 mg Oral HS Aster Johnson MD          Today, Patient Was Seen By: Ramos Townsend MD    ** Please Note: Dictation voice to text software may have been used in the creation of this document   **

## 2022-02-25 NOTE — ANESTHESIA POSTPROCEDURE EVALUATION
Post-Op Assessment Note    CV Status:  Stable  Pain Score: 0    Pain management: adequate     Mental Status:  Alert and awake   Hydration Status:  Euvolemic   PONV Controlled:  Controlled   Airway Patency:  Patent  Airway: intubated   Two or more mitigation strategies used for obstructive sleep apnea   Post Op Vitals Reviewed: Yes      Staff: Anesthesiologist, CRNA         No complications documented      BP      Temp     Pulse     Resp      SpO2

## 2022-02-26 ENCOUNTER — HOSPITAL ENCOUNTER (OUTPATIENT)
Dept: INFUSION CENTER | Facility: HOSPITAL | Age: 77
Discharge: HOME | End: 2022-02-26
Attending: INTERNAL MEDICINE

## 2022-02-26 LAB
ANION GAP SERPL CALCULATED.3IONS-SCNC: 9 MMOL/L (ref 4–13)
BACTERIA UR CULT: NORMAL
BASOPHILS # BLD AUTO: 0.02 THOUSANDS/ΜL (ref 0–0.1)
BASOPHILS NFR BLD AUTO: 0 % (ref 0–1)
BUN SERPL-MCNC: 50 MG/DL (ref 5–25)
CALCIUM SERPL-MCNC: 7.8 MG/DL (ref 8.3–10.1)
CHLORIDE SERPL-SCNC: 108 MMOL/L (ref 100–108)
CO2 SERPL-SCNC: 20 MMOL/L (ref 21–32)
CREAT SERPL-MCNC: 3.43 MG/DL (ref 0.6–1.3)
EOSINOPHIL # BLD AUTO: 0.01 THOUSAND/ΜL (ref 0–0.61)
EOSINOPHIL NFR BLD AUTO: 0 % (ref 0–6)
ERYTHROCYTE [DISTWIDTH] IN BLOOD BY AUTOMATED COUNT: 15.6 % (ref 11.6–15.1)
GFR SERPL CREATININE-BSD FRML MDRD: 16 ML/MIN/1.73SQ M
GLUCOSE SERPL-MCNC: 124 MG/DL (ref 65–140)
GLUCOSE SERPL-MCNC: 151 MG/DL (ref 65–140)
GLUCOSE SERPL-MCNC: 90 MG/DL (ref 65–140)
GLUCOSE SERPL-MCNC: 96 MG/DL (ref 65–140)
HCT VFR BLD AUTO: 23.5 % (ref 36.5–49.3)
HGB BLD-MCNC: 8.2 G/DL (ref 12–17)
IMM GRANULOCYTES # BLD AUTO: 0.04 THOUSAND/UL (ref 0–0.2)
IMM GRANULOCYTES NFR BLD AUTO: 1 % (ref 0–2)
LYMPHOCYTES # BLD AUTO: 0.47 THOUSANDS/ΜL (ref 0.6–4.47)
LYMPHOCYTES NFR BLD AUTO: 8 % (ref 14–44)
MCH RBC QN AUTO: 32.2 PG (ref 26.8–34.3)
MCHC RBC AUTO-ENTMCNC: 34.9 G/DL (ref 31.4–37.4)
MCV RBC AUTO: 92 FL (ref 82–98)
MONOCYTES # BLD AUTO: 0.59 THOUSAND/ΜL (ref 0.17–1.22)
MONOCYTES NFR BLD AUTO: 9 % (ref 4–12)
NEUTROPHILS # BLD AUTO: 5.15 THOUSANDS/ΜL (ref 1.85–7.62)
NEUTS SEG NFR BLD AUTO: 82 % (ref 43–75)
NRBC BLD AUTO-RTO: 0 /100 WBCS
PLATELET # BLD AUTO: 155 THOUSANDS/UL (ref 149–390)
PMV BLD AUTO: 9.7 FL (ref 8.9–12.7)
POTASSIUM SERPL-SCNC: 4.8 MMOL/L (ref 3.5–5.3)
RBC # BLD AUTO: 2.55 MILLION/UL (ref 3.88–5.62)
SODIUM SERPL-SCNC: 137 MMOL/L (ref 136–145)
WBC # BLD AUTO: 6.28 THOUSAND/UL (ref 4.31–10.16)

## 2022-02-26 PROCEDURE — 82948 REAGENT STRIP/BLOOD GLUCOSE: CPT

## 2022-02-26 PROCEDURE — 80048 BASIC METABOLIC PNL TOTAL CA: CPT | Performed by: INTERNAL MEDICINE

## 2022-02-26 PROCEDURE — 99232 SBSQ HOSP IP/OBS MODERATE 35: CPT | Performed by: NURSE PRACTITIONER

## 2022-02-26 PROCEDURE — 85025 COMPLETE CBC W/AUTO DIFF WBC: CPT | Performed by: INTERNAL MEDICINE

## 2022-02-26 PROCEDURE — 99232 SBSQ HOSP IP/OBS MODERATE 35: CPT | Performed by: INTERNAL MEDICINE

## 2022-02-26 RX ORDER — LANOLIN ALCOHOL/MO/W.PET/CERES
6 CREAM (GRAM) TOPICAL
Status: DISCONTINUED | OUTPATIENT
Start: 2022-02-26 | End: 2022-03-02 | Stop reason: HOSPADM

## 2022-02-26 RX ADMIN — METOPROLOL SUCCINATE 100 MG: 50 TABLET, EXTENDED RELEASE ORAL at 08:24

## 2022-02-26 RX ADMIN — ASPIRIN 81 MG: 81 TABLET, COATED ORAL at 08:23

## 2022-02-26 RX ADMIN — ISOSORBIDE MONONITRATE 30 MG: 30 TABLET, EXTENDED RELEASE ORAL at 05:19

## 2022-02-26 RX ADMIN — DULOXETINE HYDROCHLORIDE 20 MG: 20 CAPSULE, DELAYED RELEASE PELLETS ORAL at 08:28

## 2022-02-26 RX ADMIN — HEPARIN SODIUM 5000 UNITS: 5000 INJECTION INTRAVENOUS; SUBCUTANEOUS at 15:13

## 2022-02-26 RX ADMIN — INSULIN GLARGINE 18 UNITS: 100 INJECTION, SOLUTION SUBCUTANEOUS at 21:20

## 2022-02-26 RX ADMIN — BIMATOPROST 1 DROP: 0.1 SOLUTION/ DROPS OPHTHALMIC at 21:21

## 2022-02-26 RX ADMIN — OXYBUTYNIN CHLORIDE 5 MG: 5 TABLET ORAL at 16:37

## 2022-02-26 RX ADMIN — SERTRALINE HYDROCHLORIDE 50 MG: 50 TABLET ORAL at 08:24

## 2022-02-26 RX ADMIN — OXYBUTYNIN CHLORIDE 5 MG: 5 TABLET ORAL at 08:24

## 2022-02-26 RX ADMIN — HYDROCODONE BITARTRATE AND ACETAMINOPHEN 1 TABLET: 5; 325 TABLET ORAL at 09:29

## 2022-02-26 RX ADMIN — OXYBUTYNIN CHLORIDE 5 MG: 5 TABLET ORAL at 21:19

## 2022-02-26 RX ADMIN — GABAPENTIN 300 MG: 300 CAPSULE ORAL at 21:19

## 2022-02-26 RX ADMIN — EZETIMIBE 10 MG: 10 TABLET ORAL at 05:19

## 2022-02-26 RX ADMIN — CEFTRIAXONE SODIUM 1000 MG: 10 INJECTION, POWDER, FOR SOLUTION INTRAVENOUS at 06:09

## 2022-02-26 RX ADMIN — HYDROMORPHONE HYDROCHLORIDE 1 MG: 1 INJECTION, SOLUTION INTRAMUSCULAR; INTRAVENOUS; SUBCUTANEOUS at 19:37

## 2022-02-26 RX ADMIN — MELATONIN 6 MG: at 22:03

## 2022-02-26 RX ADMIN — TAMSULOSIN HYDROCHLORIDE 0.4 MG: 0.4 CAPSULE ORAL at 21:19

## 2022-02-26 RX ADMIN — HEPARIN SODIUM 5000 UNITS: 5000 INJECTION INTRAVENOUS; SUBCUTANEOUS at 05:19

## 2022-02-26 RX ADMIN — ACETAMINOPHEN 650 MG: 325 TABLET, FILM COATED ORAL at 13:36

## 2022-02-26 RX ADMIN — HEPARIN SODIUM 5000 UNITS: 5000 INJECTION INTRAVENOUS; SUBCUTANEOUS at 21:19

## 2022-02-26 RX ADMIN — PANTOPRAZOLE SODIUM 40 MG: 40 TABLET, DELAYED RELEASE ORAL at 05:19

## 2022-02-26 RX ADMIN — HYDROMORPHONE HYDROCHLORIDE 1 MG: 1 INJECTION, SOLUTION INTRAMUSCULAR; INTRAVENOUS; SUBCUTANEOUS at 12:02

## 2022-02-26 RX ADMIN — CALCITRIOL CAPSULES 0.25 MCG 0.25 MCG: 0.25 CAPSULE ORAL at 08:23

## 2022-02-26 NOTE — ASSESSMENT & PLAN NOTE
· CT with worsening diffuse bladder wall thickening, could be cystitis versus progression of tumor  · Urine culture on 02/17/2022 showed growth of Enterococcus faecalis  · Urine culture on admission was negative for growth  · Continue ceftriaxone for now    Follow-up intraoperative cultures  · Urology follow-up

## 2022-02-26 NOTE — PROGRESS NOTES
[facility]  Progress Note - Oneil Habermann 1945, 76 y o  male MRN: [id number]  Unit/Bed#: E2 -01 Encounter: [id number]  Primary Care Provider: Miguelito Davenport MD   Date and time admitted to hospital: 2/23/2022  2:50 PM    * Abnormal CT scan, bladder  Assessment & Plan  · CT with worsening diffuse bladder wall thickening, could be cystitis versus progression of tumor  · Urine culture on 02/17/2022 showed growth of Enterococcus faecalis  · Urine culture on admission was negative for growth  · Continue ceftriaxone for now  Follow-up intraoperative cultures  · Urology follow-up    Acute kidney injury superimposed on CKD [facility])  Assessment & Plan  · GARY on CKD 4 secondary to obstructive uropathy  · Creatinine is improving  · Status post percutaneous nephrostomy tube placement 2/25  · Pain control is adequate  · Follow-up with urology for eventual stent removal  · DC  IV fluids  Repeat labs in a m  · Discussed with urology  The right ureteral will be removed sometime next week, possibly inpatient    Type 2 diabetes mellitus, with long-term current use of insulin [facility])  Assessment & Plan  Lab Results   Component Value Date    HGBA1C 7 7 (H) 09/03/2021       Recent Labs     02/25/22  1240 02/25/22  1614 02/25/22  2104 02/26/22  1115   POCGLU 127 105 84 96     Continue Lantus with sliding scale insulin    Bladder carcinoma (HCC)  Assessment & Plan  · Stage II bladder carcinoma followed by Urology    · Status post recent TURBT 10/18/21  · Previously treated with BCG instillation  · Outpatient follow-up with urology for further treatment    Hypertension with renal disease  Assessment & Plan  · Continue metoprolol 100 mg daily    Hyperlipidemia  Assessment & Plan  · Continue Zetia daily    Coronary artery disease of native artery of native heart with stable angina pectoris (HCC)  Assessment & Plan  · Stable on baby aspirin and Toprol  mg daily      VTE Pharmacologic Prophylaxis:   Pharmacologic: Heparin  Mechanical VTE Prophylaxis in Place: Yes    Patient Centered Rounds:  Discussed with his nurse    Discussions with Specialists or Other Care Team Provider:  Urology    Education and Discussions with Family / Patient:  Called and updated his granddaughter who called earlier today  Time Spent for Care: 20 minutes  More than 50% of total time spent on counseling and coordination of care as described above  Current Length of Stay: 3 day(s)    Current Patient Status: Inpatient   Certification Statement: The patient will continue to require additional inpatient hospital stay due to GARY, weakness    Discharge Plan:  Consult PT OT  Anticipate rehab placement due to asthenia    Code Status: Level 1 - Full Code      Subjective:   + back pain  + weak  No fever or chills    Objective:     Vitals:   Temp (24hrs), Av °F (36 7 °C), Min:96 8 °F (36 °C), Max:99 °F (37 2 °C)    Temp:  [96 8 °F (36 °C)-99 °F (37 2 °C)] 98 4 °F (36 9 °C)  HR:  [] 85  Resp:  [18] 18  BP: (121-156)/(67-84) 140/77  SpO2:  [96 %-99 %] 98 %  Body mass index is 30 67 kg/m²  Input and Output Summary (last 24 hours): Intake/Output Summary (Last 24 hours) at 2022 1251  Last data filed at 2022 1201  Gross per 24 hour   Intake 607 5 ml   Output 3028 ml   Net -2420 5 ml       Physical Exam:     Physical Exam  Constitutional:       Appearance: He is not ill-appearing or diaphoretic  HENT:      Head: Normocephalic and atraumatic  Nose: No rhinorrhea  Eyes:      General: No scleral icterus  Cardiovascular:      Rate and Rhythm: Regular rhythm  Heart sounds: No murmur heard  No gallop  Pulmonary:      Effort: Pulmonary effort is normal  No respiratory distress  Breath sounds: No wheezing or rales  Abdominal:      General: Abdomen is flat  Palpations: Abdomen is soft        Comments: Bilateral percutaneous nephrostomy   Musculoskeletal:      Cervical back: Neck supple  Right lower leg: No edema  Left lower leg: No edema  Skin:     General: Skin is warm and dry  Neurological:      Mental Status: He is alert and oriented to person, place, and time  Comments: Looks deconditioned   Psychiatric:         Mood and Affect: Mood normal          Behavior: Behavior normal        Additional Data:     Labs:    Results from last 7 days   Lab Units 02/26/22  0817   WBC Thousand/uL 6 28   HEMOGLOBIN g/dL 8 2*   HEMATOCRIT % 23 5*   PLATELETS Thousands/uL 155   NEUTROS PCT % 82*   LYMPHS PCT % 8*   MONOS PCT % 9   EOS PCT % 0     Results from last 7 days   Lab Units 02/26/22  0817 02/25/22  0429 02/25/22  0429   SODIUM mmol/L 137   < > 135*   POTASSIUM mmol/L 4 8   < > 5 4*   CHLORIDE mmol/L 108   < > 105   CO2 mmol/L 20*   < > 20*   BUN mg/dL 50*   < > 59*   CREATININE mg/dL 3 43*   < > 3 72*   ANION GAP mmol/L 9   < > 10   CALCIUM mg/dL 7 8*   < > 8 3   ALBUMIN g/dL  --   --  2 8*   TOTAL BILIRUBIN mg/dL  --   --  0 22   ALK PHOS U/L  --   --  211*   ALT U/L  --   --  87*   AST U/L  --   --  62*   GLUCOSE RANDOM mg/dL 90   < > 140    < > = values in this interval not displayed  Results from last 7 days   Lab Units 02/23/22  1521   INR  1 06     Results from last 7 days   Lab Units 02/26/22  1115 02/25/22  2104 02/25/22  1614 02/25/22  1240 02/25/22  0620 02/24/22  2100 02/24/22  1558 02/24/22  1117 02/24/22  0813 02/23/22  2203   POC GLUCOSE mg/dl 96 84 105 127 148* 210* 146* 170* 172* 227*         Results from last 7 days   Lab Units 02/23/22  1818 02/23/22  1521   LACTIC ACID mmol/L  --  1 1   PROCALCITONIN ng/ml 0 53* 0 53*           * I Have Reviewed All Lab Data Listed Above  * Additional Pertinent Lab Tests Reviewed:  All Labs Within Last 24 Hours Reviewed    Imaging:    Imaging Reports Reviewed Today Include:  IR report    Recent Cultures (last 7 days):     Results from last 7 days   Lab Units 02/25/22  1156 02/25/22  1134 02/23/22  1543 02/23/22  1521 BLOOD CULTURE   --   --   --  No Growth at 48 hrs  No Growth at 48 hrs  URINE CULTURE  10,000-19,000 cfu/ml  Culture results to follow   No Growth <1000 cfu/mL  --        Last 24 Hours Medication List:   Current Facility-Administered Medications   Medication Dose Route Frequency Provider Last Rate    acetaminophen  650 mg Oral Q6H PRN Rosalba Benedict MD      ALPRAZolam  0 25 mg Oral BID PRN Rosalba Benedict MD      aspirin  81 mg Oral Daily Rosalba Benedict MD      bimatoprost  1 drop Both Eyes HS Rosalba Benedict MD      calcitriol  0 25 mcg Oral Daily Rosalba Benedict MD      cefTRIAXone  1,000 mg Intravenous Q24H Rosalba Benedict MD 1,000 mg (02/26/22 6917)    docusate sodium  100 mg Oral BID PRN Rosalba Benedict MD      DULoxetine  20 mg Oral Daily Rosalba Benedict MD      ezetimibe  10 mg Oral Early Morning Rosalba Benedict MD      gabapentin  300 mg Oral HS Rosalba Benedict MD      heparin (porcine)  5,000 Units Subcutaneous [facility] Rosalba Benedict MD      HYDROcodone-acetaminophen  1 tablet Oral Q6H PRN Rosalba Benedict MD      HYDROmorphone  1 mg Intravenous Q3H PRN Mike Lowry PA-C      insulin glargine  18 Units Subcutaneous HS Rosalba Benedict MD      insulin lispro  1-5 Units Subcutaneous TID [facility] [place] Rosalba Benedict MD      isosorbide mononitrate  30 mg Oral Early Morning Rosalba Benedict MD      metoprolol succinate  100 mg Oral Daily Rosalba Benedict MD      ondansetron  4 mg Intravenous Q6H PRN Rosalba Benedict MD      oxybutynin  5 mg Oral TID Rosalba Benedict MD      pantoprazole  40 mg Oral Early Morning Rosalba Benedict MD      sertraline  50 mg Oral Daily Rosalba Benedict MD      sodium chloride (PF)  3 mL Intravenous Q1H PRN Rosalba Benedict MD      sodium chloride  75 mL/hr Intravenous Continuous Rosalba Benedict MD 75 mL/hr (02/25/22 1820)    tamsulosin  0 4 mg Oral HS Rosalba Benedict MD          Today, Patient Was Seen By: Majorie Boxer, MD    ** Please Note: Dictation voice to text software may have been used in the creation of this document   **

## 2022-02-26 NOTE — PROGRESS NOTES
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  · Currently undergoing radiation and chemotherapy  · Retained right ureteral stent from 10/24/21  · Moderate bilateral hydronephrosis likely secondary to related bladder pathology  Cannot exclude right ureteral stent malfunction  · Creatinine 4 04--3 87--3 7  Today's level pending   Recent baseline around 3 1-3 5  · IR was consulted   · Bbilateral PCN placement yesterday   · Left 1228 ml over 24 hours, clear yellow   · Right 1100 ml over 24 hours, clear yellow   · If patient does well with PCNs, we may remove right ureteral stent early next week  * Abnormal CT scan, bladder  Assessment & Plan  · Urine culture 2/17 with 70-79K of Enterococcus faecalis  · Levaquin 500 mg po daily x 7 days sent to pharmacy on 2/20  · On CT in ED, marked worsening of diffuse bladder wall thickening most likely representing severe cystitis  Cannot rule out progression of bladder tumor  · Blood cultures no growth at 48 hours   · Urine culture with no growth         Subjective:   HPI: Patient is now status post bl pcn insertion since yesterday, no issues with PCN overnight, draining clear yellow urine  Patient has been having several weeks of penile and rectal pain which he states has improved significantly since yesterday  Objective:  Nursing Rounds: updated RN   Vitals: Blood pressure 140/77, pulse 85, temperature 98 4 °F (36 9 °C), temperature source Temporal, resp  rate 18, weight 104 kg (229 lb 4 5 oz), SpO2 98 %  ,Body mass index is 30 67 kg/m²  Physical Exam  Vitals reviewed  Constitutional:       General: He is not in acute distress  Appearance: Normal appearance  He is obese  He is not ill-appearing, toxic-appearing or diaphoretic  HENT:      Head: Normocephalic and atraumatic  Cardiovascular:      Rate and Rhythm: Normal rate and regular rhythm  Heart sounds: Normal heart sounds  Pulmonary:      Effort: Pulmonary effort is normal  No respiratory distress  Breath sounds: Normal breath sounds  Abdominal:      General: Bowel sounds are normal       Palpations: Abdomen is soft  Tenderness: There is no abdominal tenderness  There is no right CVA tenderness, left CVA tenderness or rebound  Genitourinary:     Penis: Normal        Testes: Normal       Comments: BL PCN draining clear yellow urine   Musculoskeletal:         General: Normal range of motion  Cervical back: Normal range of motion  Skin:     General: Skin is warm and dry  Neurological:      General: No focal deficit present  Mental Status: He is alert and oriented to person, place, and time  Psychiatric:         Mood and Affect: Mood normal          Behavior: Behavior normal          Thought Content:  Thought content normal          Judgment: Judgment normal            Labs:  Recent Labs     02/23/22  1521 02/24/22  0641 02/25/22  0429 02/26/22  0817   WBC 7 19 5 94 4 86 6 28       Recent Labs     02/23/22  1521 02/24/22  0641 02/25/22  0429 02/26/22  0817   HGB 10 4* 9 7* 9 2* 8 2*     Recent Labs     02/23/22  1521 02/24/22  0641 02/25/22  0429 02/26/22  0817   HCT 30 0* 27 9* 28 0* 23 5*     Recent Labs     02/23/22  1521 02/24/22  0641 02/25/22  0429   CREATININE 4 04* 3 87* 3 72*       Urine Culture: Growth: no growth     History:    Past Medical History:   Diagnosis Date    Anemia     Last assessed: 9/28/17    Anxiety     Arteriosclerotic cardiovascular disease     Last assessed: 9/28/17    Arthritis     Bladder cancer ([facility] [address] )     bladder- had BCG treatments    Chronic kidney disease     Stage IV    CKD (chronic kidney disease) stage 4, GFR 15-29 ml/min ([facility])     Colon polyp     Coronary artery disease     7 stents    Depression     Diabetes mellitus ([facility])     IDDM    GERD (gastroesophageal reflux disease)     Glaucoma     Hematuria     History of fusion of cervical spine     Hyperlipidemia     Hypertension     Insomnia     Last assessed: 12    Loss of hearing     has hearing aids but usually does not wear them    Other seasonal allergic rhinitis     Last assessed: 2/10/16    PAD (peripheral artery disease) ([facility])     Shortness of breath     on exertion    Spinal stenosis of lumbar region     Transient cerebral ischemia     No Residual    Uses walker     w/c for longer distances     Social History     Socioeconomic History    Marital status: /Civil Union     Spouse name: None    Number of children: None    Years of education: None    Highest education level: None   Occupational History    None   Tobacco Use    Smoking status: Former Smoker     Packs/day: 3 00     Years: 27 00     Pack years: 81 00     Types: Cigarettes     Quit date:      Years since quittin 1    Smokeless tobacco: Never Used   Vaping Use    Vaping Use: Never used   Substance and Sexual Activity    Alcohol use: Not Currently     Comment: beer / liquor    Drug use: Not Currently     Types: Marijuana     Comment: quit 2019 had medical marijuana    Sexual activity: Not Currently   Other Topics Concern    None   Social History Narrative    Consumes 1 cup of coffee and 1 soda per day     Social Determinants of Health     Financial Resource Strain: Not on file   Food Insecurity: Not on file   Transportation Needs: Not on file   Physical Activity: Not on file   Stress: Not on file   Social Connections: Not on file   Intimate Partner Violence: Not on file   Housing Stability: Not on file     Past Surgical History:   Procedure Laterality Date    CARDIAC SURGERY      Cath stent placement  Last assessed: 3/9/17  Interventional Catheterization    CHOLECYSTECTOMY      COLONOSCOPY      CYSTOSCOPY      Diagnostic w/biopsy  Nasrin Ritter  Last assessed: 12/1/14    CYSTOSCOPY W/ URETERAL STENT PLACEMENT Bilateral 10/18/2021    Procedure: bilateral retrogrades, cytology collection;  Surgeon: Gil Cuba MD;  [facility];  Service: Urology    CYSTOURETHROSCOPY      w/cautery  Trice Torrez    FL RETROGRADE PYELOGRAM  10/18/2021    FL RETROGRADE PYELOGRAM  10/24/2021    GASTRIC BYPASS      For morbid obesity w/Shaji-en-Y   Resolved: 11/17/09    INCISION AND DRAINAGE OF WOUND Right 2/26/2017    Procedure: INCISION AND DRAINAGE (I&D) EXTREMITY WITH APPLICATION OF GRAFT JACKET;  Surgeon: Luis Fernando Christie DPM;  [facility];  Service:     INCISION AND DRAINAGE OF WOUND Right 4/25/2017    Procedure: INCISION AND DRAINAGE (I&D) EXTREMITY, APPLICATION OF GRAFT;  Surgeon: Luis Fernando Christie DPM;  [facility];  Service:     IR BIOPSY OTHER  7/2/2020    IR LOWER EXTREMITY ANGIOGRAM  2/8/2021    IR LOWER EXTREMITY ANGIOGRAM  2/11/2021    IR NEPHROSTOMY TUBE PLACEMENT  2/25/2022    IR PORT PLACEMENT  1/17/2022    IR TUNNELED CENTRAL LINE PLACEMENT  12/24/2020    JOINT REPLACEMENT      christofer knees replaced    ME AMPUTATION METATARSAL+TOE,SINGLE Left 12/21/2020    Procedure: RAY RESECTION FOOT;  Surgeon: Estephania Greene DPM;  [facility];  Service: Podiatry    ME AMPUTATION METATARSAL+TOE,SINGLE Left 12/31/2020    Procedure: 5TH MET RESECTION;  Surgeon: Estephania Greene DPM;  [facility];  Service: Podiatry    ME CYSTOURETHROSCOPY W/IRRIG & EVAC CLOTS N/A 2/10/2021    Procedure: CYSTOSCOPY EVACUATION OF CLOTS, fulguration;  Surgeon: Tracey Tapia MD;  [facility];  Service: Urology    ME CYSTOURETHROSCOPY W/IRRIG & EVAC CLOTS N/A 10/24/2021    Procedure: CYSTOSCOPY EVACUATION OF CLOT, fulguration of bleeding vessels, right ureter stent placement, retrograde pyelogram;  Surgeon: Jeferson Cates MD;  [facility];  Service: Urology    ME CYSTOURETHROSCOPY,BIOPSY N/A 8/16/2016 Procedure: CYSTOSCOPY WITH BIOPSIES;  Surgeon: Kelli Bonner MD;  [facility];  Service: Urology    [place] CYSTOURETHROSCOPY,FULGUR <0 5 CM LESN N/A 11/19/2020    Procedure: CYSTO W/BIOPSIES, transurethral prostate bx;  Surgeon: Hansel Woo MD;  [facility];  Service: Urology    [place] CYSTOURETHROSCOPY,FULGUR >5 CM LESN Bilateral 10/18/2021    Procedure: TRANSURETHRAL RESECTION OF BLADDER TUMOR (TURBT);   Surgeon: India Tee MD;  [facility];  Service: Urology    [place] Jocy Self 3RD+ ORD [address] PEL/LXTR [facility] Left 2/8/2021    Procedure: LEG angiogram, CO2 w/limited contrast with balloon angioplasty postertior tibial artery;  Surgeon: Julita Yo MD;  [facility];  Service: Vascular    ROTATOR CUFF REPAIR      SMALL INTESTINE SURGERY      Surgery Shaji-en-Y    SPINAL FUSION      lumbar and cervical fusions    VAC DRESSING APPLICATION Right 2/25/0281    Procedure: APPLICATION VAC DRESSING;  Surgeon: Stoney Hall DPM;  [facility];  Service:     WOUND DEBRIDEMENT Left 2/16/2021    Procedure: FOOT DEBRIDE, [address] OUT w/graft application;  Surgeon: Stoney Hall DPM;  [facility];  Service: Podiatry     Family History   Problem Relation Age of Onset    Diabetes Mother     Heart disease Mother     Other Mother         High blood pressure    Heart disease Father     Diabetes Sister     Other Sister         High blood pressure    Kidney disease Sister     Heart disease Brother     Other Brother         High blood pressure       DeaEVANGELINA Jacobs  Date: 2/26/2022 Time: 8:29 AM

## 2022-02-26 NOTE — ASSESSMENT & PLAN NOTE
· GARY on CKD 4 secondary to obstructive uropathy  · Creatinine is improving  · Status post percutaneous nephrostomy tube placement today  · Pain control is adequate  · Follow-up with urology for eventual stent removal  · DC  IV fluids  Repeat labs in a m  · Discussed with urology    The right ureteral will be removed sometime next week, possibly inpatient

## 2022-02-26 NOTE — PLAN OF CARE
Problem: PAIN - ADULT  Goal: Verbalizes/displays adequate comfort level or baseline comfort level  Description: Interventions:  - Encourage patient to monitor pain and request assistance  - Assess pain using appropriate pain scale  - Administer analgesics based on type and severity of pain and evaluate response  - Implement non-pharmacological measures as appropriate and evaluate response  - Consider cultural and social influences on pain and pain management  - Notify physician/advanced practitioner if interventions unsuccessful or patient reports new pain  Outcome: Progressing     Problem: INFECTION - ADULT  Goal: Absence or prevention of progression during hospitalization  Description: INTERVENTIONS:  - Assess and monitor for signs and symptoms of infection  - Monitor lab/diagnostic results  - Monitor all insertion sites, i e  indwelling lines, tubes, and drains  - Monitor endotracheal if appropriate and nasal secretions for changes in amount and color  - [place] appropriate cooling/warming therapies per order  - Administer medications as ordered  - Instruct and encourage patient and family to use good hand hygiene technique  - Identify and instruct in appropriate isolation precautions for identified infection/condition  Outcome: Progressing  Goal: Absence of fever/infection during neutropenic period  Description: INTERVENTIONS:  - Monitor WBC    Outcome: Progressing     Problem: SAFETY ADULT  Goal: Patient will remain free of falls  Description: INTERVENTIONS:  - Educate patient/family on patient safety including physical limitations  - Instruct patient to call for assistance with activity   - Consult OT/PT to assist with strengthening/mobility   - Keep Call bell within reach  - Keep bed low and locked with side rails adjusted as appropriate  - Keep care items and personal belongings within reach  - Initiate and maintain comfort rounds  - Make Fall Risk Sign visible to staff  - Offer Toileting every 4 Hours, in advance of need    - Apply yellow socks and bracelet for high fall risk patients  - Consider moving patient to room near nurses station  Outcome: Progressing  Goal: Maintain or return to baseline ADL function  Description: INTERVENTIONS:  -  Assess patient's ability to carry out ADLs; assess patient's baseline for ADL function and identify physical deficits which impact ability to perform ADLs (bathing, care of mouth/teeth, toileting, grooming, dressing, etc )  - Assess/evaluate cause of self-care deficits   - Assess range of motion  - Assess patient's mobility; develop plan if impaired  - Assess patient's need for assistive devices and provide as appropriate  - Encourage maximum independence but intervene and supervise when necessary  - Involve family in performance of ADLs  - Assess for home care needs following discharge   - Consider OT consult to assist with ADL evaluation and planning for discharge  - Provide patient education as appropriate  Outcome: Progressing  Goal: Maintains/Returns to pre admission functional level  Description: INTERVENTIONS:  - Perform BMAT or MOVE assessment daily    - Set and communicate daily mobility goal to care team and patient/family/caregiver  - Collaborate with rehabilitation services on mobility goals if consulted  - Perform Range of Motion 3 times a day  - Reposition patient every 4 hours    - Dangle patient 3 times a day  - Stand patient 3 times a day  - Ambulate patient 3 times a day  - Out of bed to chair 3 times a day   - Out of bed for meals 3 times a day  - Out of bed for toileting  - Record patient progress and toleration of activity level   Outcome: Progressing     Problem: DISCHARGE PLANNING  Goal: Discharge to home or other facility with appropriate resources  Description: INTERVENTIONS:  - Identify barriers to discharge w/patient and caregiver  - Arrange for needed discharge resources and transportation as appropriate  - Identify discharge learning needs (meds, wound care, etc )  - Arrange for interpretive services to assist at discharge as needed  - Refer to Case Management Department for coordinating discharge planning if the patient needs post-hospital services based on physician/advanced practitioner order or complex needs related to functional status, cognitive ability, or social support system  Outcome: Progressing     Problem: Knowledge Deficit  Goal: Patient/family/caregiver demonstrates understanding of disease process, treatment plan, medications, and discharge instructions  Description: Complete learning assessment and assess knowledge base    Interventions:  - Provide teaching at level of understanding  - Provide teaching via preferred learning methods  Outcome: Progressing     Problem: DISCHARGE PLANNING - CARE MANAGEMENT  Goal: Discharge to post-acute care or home with appropriate resources  Description: INTERVENTIONS:  - Conduct assessment to determine patient/family and health care team treatment goals, and need for post-acute services based on payer coverage, community resources, and patient preferences, and barriers to discharge  - Address psychosocial, clinical, and financial barriers to discharge as identified in assessment in conjunction with the patient/family and health care team  - Arrange appropriate level of post-acute services according to patients   needs and preference and payer coverage in collaboration with the physician and health care team  - Communicate with and update the patient/family, physician, and health care team regarding progress on the discharge plan  - Arrange appropriate transportation to post-acute venues  Outcome: Progressing     Problem: Prexisting or High Potential for Compromised Skin Integrity  Goal: Skin integrity is maintained or improved  Description: INTERVENTIONS:  - Identify patients at risk for skin breakdown  - Assess and monitor skin integrity  - Assess and monitor nutrition and hydration status  - Monitor labs   - Assess for incontinence   - Turn and reposition patient  - Assist with mobility/ambulation  - Relieve pressure over bony prominences  - Avoid friction and shearing  - Provide appropriate hygiene as needed including keeping skin clean and dry  - Evaluate need for skin moisturizer/barrier cream  - Collaborate with interdisciplinary team   - Patient/family teaching  - Consider wound care consult   Outcome: Progressing     Problem: GENITOURINARY - ADULT  Goal: Maintains or returns to baseline urinary function  Description: INTERVENTIONS:  - Assess urinary function  - Encourage oral fluids to ensure adequate hydration if ordered  - Administer IV fluids as ordered to ensure adequate hydration  - Administer ordered medications as needed  - Offer frequent toileting  - Follow urinary retention protocol if ordered  Outcome: Progressing  Goal: Absence of urinary retention  Description: INTERVENTIONS:  - Assess patients ability to void and empty bladder  - Monitor I/O  - Bladder scan as needed  - Discuss with physician/AP medications to alleviate retention as needed  - Discuss catheterization for long term situations as appropriate  Outcome: Progressing  Goal: Urinary catheter remains patent  Description: INTERVENTIONS:  - Assess patency of urinary catheter  - If patient has a chronic wagner, consider changing catheter if non-functioning  - Follow guidelines for intermittent irrigation of non-functioning urinary catheter  Outcome: Progressing

## 2022-02-27 LAB
ANION GAP SERPL CALCULATED.3IONS-SCNC: 8 MMOL/L (ref 4–13)
BACTERIA UR CULT: ABNORMAL
BASOPHILS # BLD AUTO: 0.02 THOUSANDS/ΜL (ref 0–0.1)
BASOPHILS NFR BLD AUTO: 0 % (ref 0–1)
BUN SERPL-MCNC: 52 MG/DL (ref 5–25)
CALCIUM SERPL-MCNC: 8.3 MG/DL (ref 8.3–10.1)
CHLORIDE SERPL-SCNC: 107 MMOL/L (ref 100–108)
CO2 SERPL-SCNC: 21 MMOL/L (ref 21–32)
CREAT SERPL-MCNC: 3.63 MG/DL (ref 0.6–1.3)
EOSINOPHIL # BLD AUTO: 0.12 THOUSAND/ΜL (ref 0–0.61)
EOSINOPHIL NFR BLD AUTO: 2 % (ref 0–6)
ERYTHROCYTE [DISTWIDTH] IN BLOOD BY AUTOMATED COUNT: 15.7 % (ref 11.6–15.1)
GFR SERPL CREATININE-BSD FRML MDRD: 14 ML/MIN/1.73SQ M
GLUCOSE SERPL-MCNC: 111 MG/DL (ref 65–140)
GLUCOSE SERPL-MCNC: 129 MG/DL (ref 65–140)
GLUCOSE SERPL-MCNC: 131 MG/DL (ref 65–140)
GLUCOSE SERPL-MCNC: 150 MG/DL (ref 65–140)
GLUCOSE SERPL-MCNC: 165 MG/DL (ref 65–140)
HCT VFR BLD AUTO: 23.6 % (ref 36.5–49.3)
HGB BLD-MCNC: 8.2 G/DL (ref 12–17)
IMM GRANULOCYTES # BLD AUTO: 0.05 THOUSAND/UL (ref 0–0.2)
IMM GRANULOCYTES NFR BLD AUTO: 1 % (ref 0–2)
LYMPHOCYTES # BLD AUTO: 1.13 THOUSANDS/ΜL (ref 0.6–4.47)
LYMPHOCYTES NFR BLD AUTO: 17 % (ref 14–44)
MCH RBC QN AUTO: 32.2 PG (ref 26.8–34.3)
MCHC RBC AUTO-ENTMCNC: 34.7 G/DL (ref 31.4–37.4)
MCV RBC AUTO: 93 FL (ref 82–98)
MONOCYTES # BLD AUTO: 0.87 THOUSAND/ΜL (ref 0.17–1.22)
MONOCYTES NFR BLD AUTO: 13 % (ref 4–12)
NEUTROPHILS # BLD AUTO: 4.48 THOUSANDS/ΜL (ref 1.85–7.62)
NEUTS SEG NFR BLD AUTO: 67 % (ref 43–75)
NRBC BLD AUTO-RTO: 0 /100 WBCS
PLATELET # BLD AUTO: 154 THOUSANDS/UL (ref 149–390)
PMV BLD AUTO: 9.7 FL (ref 8.9–12.7)
POTASSIUM SERPL-SCNC: 5 MMOL/L (ref 3.5–5.3)
RBC # BLD AUTO: 2.55 MILLION/UL (ref 3.88–5.62)
SODIUM SERPL-SCNC: 136 MMOL/L (ref 136–145)
WBC # BLD AUTO: 6.67 THOUSAND/UL (ref 4.31–10.16)

## 2022-02-27 PROCEDURE — 99232 SBSQ HOSP IP/OBS MODERATE 35: CPT | Performed by: NURSE PRACTITIONER

## 2022-02-27 PROCEDURE — 85025 COMPLETE CBC W/AUTO DIFF WBC: CPT | Performed by: INTERNAL MEDICINE

## 2022-02-27 PROCEDURE — 97166 OT EVAL MOD COMPLEX 45 MIN: CPT

## 2022-02-27 PROCEDURE — 80048 BASIC METABOLIC PNL TOTAL CA: CPT | Performed by: INTERNAL MEDICINE

## 2022-02-27 PROCEDURE — 82948 REAGENT STRIP/BLOOD GLUCOSE: CPT

## 2022-02-27 PROCEDURE — 97163 PT EVAL HIGH COMPLEX 45 MIN: CPT

## 2022-02-27 PROCEDURE — 99232 SBSQ HOSP IP/OBS MODERATE 35: CPT | Performed by: INTERNAL MEDICINE

## 2022-02-27 RX ORDER — LEVOFLOXACIN 250 MG/1
250 TABLET ORAL EVERY 24 HOURS
Status: DISCONTINUED | OUTPATIENT
Start: 2022-02-27 | End: 2022-03-02 | Stop reason: HOSPADM

## 2022-02-27 RX ORDER — AMOXICILLIN 250 MG
1 CAPSULE ORAL 2 TIMES DAILY
Status: DISCONTINUED | OUTPATIENT
Start: 2022-02-27 | End: 2022-03-02 | Stop reason: HOSPADM

## 2022-02-27 RX ORDER — SODIUM CHLORIDE 9 MG/ML
75 INJECTION, SOLUTION INTRAVENOUS CONTINUOUS
Status: DISCONTINUED | OUTPATIENT
Start: 2022-02-27 | End: 2022-03-01

## 2022-02-27 RX ADMIN — METOPROLOL SUCCINATE 100 MG: 50 TABLET, EXTENDED RELEASE ORAL at 08:19

## 2022-02-27 RX ADMIN — HYDROCODONE BITARTRATE AND ACETAMINOPHEN 1 TABLET: 5; 325 TABLET ORAL at 20:12

## 2022-02-27 RX ADMIN — HYDROCODONE BITARTRATE AND ACETAMINOPHEN 1 TABLET: 5; 325 TABLET ORAL at 08:21

## 2022-02-27 RX ADMIN — TAMSULOSIN HYDROCHLORIDE 0.4 MG: 0.4 CAPSULE ORAL at 23:15

## 2022-02-27 RX ADMIN — PANTOPRAZOLE SODIUM 40 MG: 40 TABLET, DELAYED RELEASE ORAL at 06:14

## 2022-02-27 RX ADMIN — INSULIN LISPRO 1 UNITS: 100 INJECTION, SOLUTION INTRAVENOUS; SUBCUTANEOUS at 16:26

## 2022-02-27 RX ADMIN — OXYBUTYNIN CHLORIDE 5 MG: 5 TABLET ORAL at 08:19

## 2022-02-27 RX ADMIN — OXYBUTYNIN CHLORIDE 5 MG: 5 TABLET ORAL at 20:09

## 2022-02-27 RX ADMIN — HYDROMORPHONE HYDROCHLORIDE 1 MG: 1 INJECTION, SOLUTION INTRAMUSCULAR; INTRAVENOUS; SUBCUTANEOUS at 19:21

## 2022-02-27 RX ADMIN — MELATONIN 6 MG: at 23:15

## 2022-02-27 RX ADMIN — HYDROMORPHONE HYDROCHLORIDE 1 MG: 1 INJECTION, SOLUTION INTRAMUSCULAR; INTRAVENOUS; SUBCUTANEOUS at 06:15

## 2022-02-27 RX ADMIN — ASPIRIN 81 MG: 81 TABLET, COATED ORAL at 08:19

## 2022-02-27 RX ADMIN — DULOXETINE HYDROCHLORIDE 20 MG: 20 CAPSULE, DELAYED RELEASE PELLETS ORAL at 08:19

## 2022-02-27 RX ADMIN — CALCITRIOL CAPSULES 0.25 MCG 0.25 MCG: 0.25 CAPSULE ORAL at 08:19

## 2022-02-27 RX ADMIN — HYDROMORPHONE HYDROCHLORIDE 1 MG: 1 INJECTION, SOLUTION INTRAMUSCULAR; INTRAVENOUS; SUBCUTANEOUS at 23:16

## 2022-02-27 RX ADMIN — HEPARIN SODIUM 5000 UNITS: 5000 INJECTION INTRAVENOUS; SUBCUTANEOUS at 13:25

## 2022-02-27 RX ADMIN — HYDROMORPHONE HYDROCHLORIDE 1 MG: 1 INJECTION, SOLUTION INTRAMUSCULAR; INTRAVENOUS; SUBCUTANEOUS at 13:27

## 2022-02-27 RX ADMIN — GABAPENTIN 300 MG: 300 CAPSULE ORAL at 23:15

## 2022-02-27 RX ADMIN — HEPARIN SODIUM 5000 UNITS: 5000 INJECTION INTRAVENOUS; SUBCUTANEOUS at 06:15

## 2022-02-27 RX ADMIN — LEVOFLOXACIN 250 MG: 250 TABLET, FILM COATED ORAL at 12:44

## 2022-02-27 RX ADMIN — OXYBUTYNIN CHLORIDE 5 MG: 5 TABLET ORAL at 16:26

## 2022-02-27 RX ADMIN — EZETIMIBE 10 MG: 10 TABLET ORAL at 06:14

## 2022-02-27 RX ADMIN — CEFTRIAXONE SODIUM 1000 MG: 10 INJECTION, POWDER, FOR SOLUTION INTRAVENOUS at 06:15

## 2022-02-27 RX ADMIN — ISOSORBIDE MONONITRATE 30 MG: 30 TABLET, EXTENDED RELEASE ORAL at 06:14

## 2022-02-27 RX ADMIN — DOCUSATE SODIUM 100 MG: 100 CAPSULE ORAL at 12:44

## 2022-02-27 RX ADMIN — SERTRALINE HYDROCHLORIDE 50 MG: 50 TABLET ORAL at 08:19

## 2022-02-27 RX ADMIN — INSULIN GLARGINE 18 UNITS: 100 INJECTION, SOLUTION SUBCUTANEOUS at 23:16

## 2022-02-27 RX ADMIN — DOCUSATE SODIUM 50MG AND SENNOSIDES 8.6MG 1 TABLET: 8.6; 5 TABLET, FILM COATED ORAL at 20:09

## 2022-02-27 RX ADMIN — SODIUM CHLORIDE 75 ML/HR: 0.9 INJECTION, SOLUTION INTRAVENOUS at 20:12

## 2022-02-27 RX ADMIN — BIMATOPROST 1 DROP: 0.1 SOLUTION/ DROPS OPHTHALMIC at 23:25

## 2022-02-27 RX ADMIN — INSULIN LISPRO 1 UNITS: 100 INJECTION, SOLUTION INTRAVENOUS; SUBCUTANEOUS at 12:01

## 2022-02-27 RX ADMIN — HEPARIN SODIUM 5000 UNITS: 5000 INJECTION INTRAVENOUS; SUBCUTANEOUS at 23:16

## 2022-02-27 RX ADMIN — SODIUM CHLORIDE 75 ML/HR: 0.9 INJECTION, SOLUTION INTRAVENOUS at 08:20

## 2022-02-27 NOTE — ASSESSMENT & PLAN NOTE
· CT with worsening diffuse bladder wall thickening, could be cystitis versus progression of tumor  · Urine culture on 02/17/2022 showed growth of Enterococcus faecalis  · Urine culture on admission was negative for growth  · Intraoperative culture from the right nephrostomy, is growing Enterococcus faecalis  · Switch antibiotic to Levaquin (he has tolerated this in the past) since he has a stent there and urology is planning to remove the stent this week

## 2022-02-27 NOTE — ASSESSMENT & PLAN NOTE
Lab Results   Component Value Date    HGBA1C 7 7 (H) 09/03/2021       Recent Labs     02/26/22  1545 02/26/22  2110 02/27/22  0614 02/27/22  1116   POCGLU 124 151* 131 150*     Continue Lantus with sliding scale insulin

## 2022-02-27 NOTE — PLAN OF CARE
Problem: PHYSICAL THERAPY ADULT  Goal: Performs mobility at highest level of function for planned discharge setting  See evaluation for individualized goals  Description: Treatment/Interventions: Functional transfer training,LE strengthening/ROM,Elevations,Therapeutic exercise,Patient/family training,Equipment eval/education,Bed mobility,Gait training,Compensatory technique education,Continued evaluation,Spoke to nursing,OT          See flowsheet documentation for full assessment, interventions and recommendations  2/27/2022 1025 by Nolan Sosa, PT  Note: Prognosis: Good  Problem List: Decreased strength,Impaired balance,Decreased mobility,Decreased skin integrity,Pain,Impaired judgement,Decreased safety awareness,Impaired sensation  Assessment: Francisco Harden is a 76 y o  male admitted to [id number] on 2/23/2022 for Abnormal CT scan, bladder  PT was consulted and pt was seen on 2/27/2022 for mobility assessment and d/c planning  Pt presents w high fall risk, chronic pain of groin, acute pain back secondary to placement of nephrostomy tubes, multiple lines  At baseline is indep for ADLs and mobility w use of [place]  Pt is currently functioning at a supervision assistance x1 level for bed mobility, transfers and ambulation w [place]ry[place]ane  Able to ambulate household distances at this time  Further assessment of mobility (steps, ambulation community distances) limited by pain  Occ unsteady during dynamic standing tasks but no gross LOB noted  Pt will benefit from continued skilled IP PT to address the above mentioned impairments of balance, mobility in order to maximize recovery and increase functional independence when completing mobility and ADLs  At this time PT recommendations for d/c are home w HHPT when medically stable  Barriers to Discharge: None        PT Discharge Recommendation: Home with home health rehabilitation          See flowsheet documentation for full assessment       2/27/2022 1025 by Aure Best, PT  Note: Prognosis: Good  Problem List: Decreased strength,Impaired balance,Decreased mobility,Decreased skin integrity,Pain,Impaired judgement,Decreased safety awareness,Impaired sensation  Assessment: Sagar Rolle is a 76 y o  male admitted to [facility] on 2/23/2022 for Abnormal CT scan, bladder  PT was consulted and pt was seen on 2/27/2022 for mobility assessment and d/c planning  Pt presents w high fall risk, chronic pain of groin, acute pain back secondary to placement of nephrostomy tubes, multiple lines  At baseline is indep for ADLs and mobility w use of [place]  Pt is currently functioning at a supervision assistance x1 level for bed mobility, transfers and ambulation w Hurrycane  Able to ambulate household distances at this time  Further assessment of mobility (steps, ambulation community distances) limited by pain  Occ unsteady during dynamic standing tasks but no gross LOB noted  Pt will benefit from continued skilled IP PT to address the above mentioned impairments of balance, mobility in order to maximize recovery and increase functional independence when completing mobility and ADLs  At this time PT recommendations for d/c are home w HHPT when medically stable  Barriers to Discharge: None        PT Discharge Recommendation: Home with home health rehabilitation          See flowsheet documentation for full assessment

## 2022-02-27 NOTE — OCCUPATIONAL THERAPY NOTE
Occupational Therapy Evaluation     Patient Name: Chely Mortensen  Today's Date: 2/27/2022  Problem List  Principal Problem:    Abnormal CT scan, bladder  Active Problems:    Coronary artery disease of native artery of native heart with stable angina pectoris ([facility] )    Bladder carcinoma ([facility] )    Hypertension with renal disease    Hyperlipidemia    Type 2 diabetes mellitus, with long-term current use of insulin ([facility])    Acute kidney injury superimposed on CKD [facility])    Past Medical History  Past Medical History:   Diagnosis Date    Anemia     Last assessed: 9/28/17    Anxiety     Arteriosclerotic cardiovascular disease     Last assessed: 9/28/17    Arthritis     Bladder cancer ([facility] )     bladder- had BCG treatments    Chronic kidney disease     Stage IV    CKD (chronic kidney disease) stage 4, GFR 15-29 ml/min ([facility])     Colon polyp     Coronary artery disease     7 stents    Depression     Diabetes mellitus ([facility])     IDDM    GERD (gastroesophageal reflux disease)     Glaucoma     Hematuria     History of fusion of cervical spine     Hyperlipidemia     Hypertension     Insomnia     Last assessed: 11/14/12    Loss of hearing     has hearing aids but usually does not wear them    Other seasonal allergic rhinitis     Last assessed: 2/10/16    PAD (peripheral artery disease) ([facility])     Shortness of breath     on exertion    Spinal stenosis of lumbar region     Transient cerebral ischemia     No Residual    Uses walker     w/c for longer distances     Past Surgical History  Past Surgical History:   Procedure Laterality Date    CARDIAC SURGERY      Cath stent placement  Last assessed: 3/9/17  Interventional Catheterization    CHOLECYSTECTOMY      COLONOSCOPY      CYSTOSCOPY      Diagnostic w/biopsy  Heather Torres   Last assessed: 12/1/14    CYSTOSCOPY W/ URETERAL STENT PLACEMENT Bilateral 10/18/2021    Procedure: bilateral retrogrades, cytology collection;  Surgeon: Mayda Ndiaye MD; [facility];  Service: Urology    CYSTOURETHROSCOPY      w/cautery  Wolff Fogo    FL RETROGRADE PYELOGRAM  10/18/2021    FL RETROGRADE PYELOGRAM  10/24/2021    GASTRIC BYPASS      For morbid obesity w/Shaji-en-Y   Resolved: 11/17/09    INCISION AND DRAINAGE OF WOUND Right 2/26/2017    Procedure: INCISION AND DRAINAGE (I&D) EXTREMITY WITH APPLICATION OF GRAFT JACKET;  Surgeon: Ophelia Mars DPM;  [facility];  Service:     INCISION AND DRAINAGE OF WOUND Right 4/25/2017    Procedure: INCISION AND DRAINAGE (I&D) EXTREMITY, APPLICATION OF GRAFT;  Surgeon: Ophelia Mars DPM;  [facility];  Service:     IR BIOPSY OTHER  7/2/2020    IR LOWER EXTREMITY ANGIOGRAM  2/8/2021    IR LOWER EXTREMITY ANGIOGRAM  2/11/2021    IR NEPHROSTOMY TUBE PLACEMENT  2/25/2022    IR PORT PLACEMENT  1/17/2022    IR TUNNELED CENTRAL LINE PLACEMENT  12/24/2020    JOINT REPLACEMENT      christofer knees replaced    CT AMPUTATION METATARSAL+TOE,SINGLE Left 12/21/2020    Procedure: RAY RESECTION FOOT;  Surgeon: Ernst Servin DPM;  [facility];  Service: Podiatry    CT AMPUTATION METATARSAL+TOE,SINGLE Left 12/31/2020    Procedure: 5TH MET RESECTION;  Surgeon: Ernst Servin DPM;  [facility];  Service: Podiatry    CT CYSTOURETHROSCOPY W/IRRIG & EVAC CLOTS N/A 2/10/2021    Procedure: CYSTOSCOPY EVACUATION OF CLOTS, fulguration;  Surgeon: Natividad Shelley MD;  [facility];  Service: Urology    CT CYSTOURETHROSCOPY W/IRRIG & EVAC CLOTS N/A 10/24/2021    Procedure: CYSTOSCOPY EVACUATION OF CLOT, fulguration of bleeding vessels, right ureter stent placement, retrograde pyelogram;  Surgeon: Channing Sunshine MD;  [facility];  Service: Urology    CT CYSTOURETHROSCOPY,BIOPSY N/A 8/16/2016    Procedure: CYSTOSCOPY WITH BIOPSIES;  Surgeon: Justo Avelar MD;  [facility];  Service: Urology    CT CYSTOURETHROSCOPY,FULGUR <0 5 CM LESN N/A 11/19/2020    Procedure: CYSTO W/BIOPSIES, transurethral prostate bx;  Surgeon: Alice Haas MD;  [facility];  Service: Urology    [place] CYSTOURETHROSCOPY,FULGUR >5 CM LESN Bilateral 10/18/2021    Procedure: TRANSURETHRAL RESECTION OF BLADDER TUMOR (TURBT); Surgeon: Alannah Lowe MD;  [facility];  Service: Urology    [place] [address] 3RD+ ORD [address] PEL/LXTR [facility] Left 2/8/2021    Procedure: LEG angiogram, CO2 w/limited contrast with balloon angioplasty postertior tibial artery;  Surgeon: Teresa Mccray MD;  [facility];  Service: Vascular    ROTATOR CUFF REPAIR      SMALL INTESTINE SURGERY      Surgery Shaji-en-Y    SPINAL FUSION      lumbar and cervical fusions    VAC DRESSING APPLICATION Right 5/93/9822    Procedure: APPLICATION VAC DRESSING;  Surgeon: Annette Cordero DPM;  [facility];  Service:    [address] [place] [address] Left 2/16/2021    Procedure: FOOT DEBRIDE, [address] OUT w/graft application;  Surgeon: Annette Cordero DPM;  [facility];  Service: Podiatry           02/27/22 0811   OT Last Visit   OT Visit Date 02/27/22   Note Type   Note type Evaluation   Restrictions/Precautions   Weight Bearing Precautions Per Order No   Other Precautions Multiple lines; Fall Risk;Pain   Pain Assessment   Pain Assessment Tool 0-10   Pain Score 8   Pain Location/Orientation Location: Groin   Hospital Pain Intervention(s) Repositioned; Ambulation/increased activity; Emotional support; Rest   Multiple Pain Sites No   Home Living   Type of [address] [place] [address] Multi-level;1/2 bath on main level;Bed/bath upstairs; Ramped entrance   [employer] Grab bars in shower; Shower chair;Commode   Bathroom Accessibility Accessible   Home Equipment Walker;Cane;Stair glide  [facility])   Additional Comments Pt lives with spouse and sister-in-law in a multi-level house with ramped entrance and stair glide to 2nd floor   Pt utilizes bathroom in basement w/ FOS to access  Pt reports (-) home alone  Prior Function   Level of [place] Independent with ADLs and functional mobility; Needs assistance with IADLs   Lives With Spouse; Family  (sister in law)   Receives Help From Family   ADL Assistance Independent   IADLs Needs assistance  (able to do light IADLs)   Falls in the last 6 months 0   Vocational Retired   Comments At baseline, pt was I w/ ADLs and functional transfers/mobility w/ use of Hurrycane  Family assists w/ IADLs  (+)   Denies falls PTA  Lifestyle   Autonomy At baseline, pt was I w/ ADLs and functional transfers/mobility w/ use of Hurrycane  Family assists w/ IADLs  (+)   Denies falls PTA  Reciprocal Relationships Spouse, sister in law   Service to Others Retired   Intrinsic Gratification Watching TV   Psychosocial   Psychosocial ([employer] [employer]) WDL   ADL   Where Assessed Chair   Eating Assistance 7  Independent   Grooming Assistance 5  [address] [place] [address] Dr 5  Supervision/Setup   LB [address]  [address]  5  Supervision/Setup    [facility] 5  [address]  5  Supervision/Setup   Functional Assistance 5  Supervision/Setup   Bed Mobility   Supine to Sit 5  Supervision   Additional items HOB elevated; Bedrails; Increased time required   Sit to Supine Unable to assess   Additional Comments Pt seated EOB at end of session  Call bell and phone within reach  All needs met and pt reports no further questions for OT at this time  Transfers   Sit to Stand 5  Supervision   Additional items Bedrails; Increased time required   Stand to Sit 5  Supervision   Additional items Bedrails   Toilet transfer 5  Supervision   Additional items Increased time required;Verbal cues;Standard toilet  (grab bar use)   Additional Comments Cues for safe technique and hand placement   Functional Mobility   Functional Mobility 5  Supervision   Additional Comments Assist x1   Additional items   (Hurrycane)   Balance   Static Sitting Fair +   Dynamic Sitting Fair   Static Standing Fair   Dynamic Standing [address], & 5 -   Activity Tolerance   Activity Tolerance Patient limited by pain   Medical Staff Made Aware Tim Lin, PT   Nurse Made Aware yes; Vi Suggs RN   RUE Assessment   RUE Assessment [facility]   RUE Strength   RUE Overall Strength Within Functional Limits - able to perform ADL tasks with strength  (4-/5 throughout)   LUE Assessment   LUE Assessment WFL   LUE Strength   LUE Overall Strength Within Functional Limits - able to perform ADL tasks with strength  (4-/5 throughout)   Hand Function   Gross Motor Coordination Functional   Fine Motor Coordination Functional   Sensation   Light Touch Severe deficits in the RLE; Severe deficits in the LLE   Proprioception   Proprioception No apparent deficits   Vision-Basic Assessment   Current Vision Wears glasses only for reading   Vision - Complex Assessment   Ocular Range of Motion [facility]   Acuity Able to read clock/calendar on wall without difficulty; Able to read employee name badge without difficulty   Perception   Inattention/Neglect Appears intact   Cognition   Overall Cognitive Status [facility]   Arousal/Participation Alert; Cooperative   Attention Within functional limits   Orientation Level Oriented X4   Memory Within functional limits   Following Commands Follows all commands and directions without difficulty   Assessment   Limitation Decreased ADL status; Decreased UE strength;Decreased endurance;Decreased self-care trans;Decreased high-level ADLs   Prognosis Good   Assessment Pt is a 76 y o  male seen for OT evaluation [place] [facility] w/ GARY superimposed on CKD and abnormal CT scan  CT with worsening diffuse bladder wall thickening, could be cystitis versus progression of tumor  Pt s/p B/L percutaneous nephrostomy tube placement 2/25/22   Comorbidities affecting pts functional performance include a significant PMH of Anemia, Anxiety, Arthritis, CKD, CAD, Depression, DM, Glaucoma, HLD, HTN, PAD, and Spinal stenosis  Pt with active OT orders and activity orders for Activity as tolerated  Pt lives with spouse and sister-in-law in a multi-level house with ramped entrance and stair glide to 2nd floor  Pt utilizes bathroom in basement w/ FOS to access  Pt reports (-) home alone  At baseline, pt was I w/ ADLs and functional transfers/mobility w/ use of Hurrycane  Family assists w/ IADLs  (+)   Denies falls PTA  Upon evaluation, pt currently requires Supervision for UB ADLs, Supervision for LB ADLs, Supervision for toileting, Supervision for bed mobility, and Supervision for functional mobility/transfers 2* the following deficits impacting occupational performance: decreased strength, decreased balance, decreased tolerance and increased pain  These impairments, as well at pts multiple lines, fall risk, steps within home environment, difficulty performing ADLS and difficulty performing IADLS  limit pts ability to safely engage in all baseline areas of occupation  Based on the aforementioned OT evaluation, functional performance deficits, and assessments, pt has been identified as a Moderate complexity evaluation  Pt to continue to benefit from continued acute OT services during hospital stay to address defined deficits and to maximize level of functional independence in the following Occupational Performance areas: grooming, bathing/shower, toilet hygiene, dressing, medication management, health maintenance, functional mobility, community mobility, clothing management and social participation  From OT standpoint, recommend Home OT upon D/C   OT will continue to follow pt 2-3x/wk to address the following goals to  w/in 10-14 days:   Goals   Patient Goals To go home   LTG Time Frame 10-14   Long Term Goal Please refer to LTGs listed below   Plan   Treatment Interventions ADL retraining;UE strengthening/ROM; Functional transfer training; Endurance training;Patient/family training;Equipment evaluation/education; Compensatory technique education;Continued evaluation; Energy conservation; Activityengagement   Goal Expiration Date 03/13/22   OT Treatment Day 0   OT Frequency 2-3x/wk   Recommendation   OT Discharge Recommendation Home with home health rehabilitation   OT - OK to Discharge Yes  (when medically cleared)   Additional Comments  The patient's raw score on the AM-PAC Daily Activity inpatient short form is 20, standardized score is 42 03, greater than 39 4  Patients at this level are likely to benefit from discharge to home  Please refer to the recommendation of the Occupational Therapist for safe discharge planning  AM-PAC Daily Activity Inpatient   Lower Body Dressing 3   Bathing 3   Toileting 3   Upper Body Dressing 3   Grooming 4   Eating 4   Daily Activity Raw Score 20   Daily Activity Standardized Score (Calc for Raw Score >=11) 42 03   AM-PAC Applied Cognition Inpatient   Following a Speech/Presentation 4   Understanding Ordinary Conversation 4   Taking Medications 4   Remembering Where Things Are Placed or Put Away 4   Remembering List of 4-5 Errands 4   Taking Care of Complicated Tasks 4   Applied Cognition Raw Score 24   Applied Cognition Standardized Score 62 21        GOALS    1  Pt will improve activity tolerance to G for min 30 min txment sessions for increase engagement in functional tasks    2  Pt will complete bed mobility at a Mod I level w/ G balance/safety demonstrated to decrease caregiver assistance required     3  Pt will complete UB dressing/self care w/ mod I using adaptive device and DME as needed     4  Pt will complete LB dressing/self care w/ mod I using adaptive device and DME as needed    5  Pt will complete toileting w/ mod I w/ G hygiene/thoroughness using DME as needed    6   Pt will improve functional transfers to Mod I on/off all surfaces using DME as needed w/ G balance/safety     7  Pt will improve functional mobility during ADL/IADL/leisure tasks to Mod I using DME as needed w/ G balance/safety     8  Pt will be attentive 100% of the time during ongoing cognitive assessment w/ G participation to assist w/ safe d/c planning/recommendations    9  Pt will demonstrate G carryover of pt/caregiver education and training as appropriate w/o cues w/ good tolerance to increase safety during functional tasks    10  Pt will demonstrate 100% carryover of energy conservation techniques t/o functional I/ADL/leisure tasks w/o cues s/p skilled education to increase endurance during functional tasks    11  Pt will increase BUE strength by 1MM grade via AROM exercises to increase independence in ADLs and transfers    12   Pt will increase standing tolerance to 8-10 mins with Fair+ dynamic standing balance to increase safety during participation in ADLs       Ruddy Troy, OTR/L

## 2022-02-27 NOTE — PLAN OF CARE
Problem: OCCUPATIONAL THERAPY ADULT  Goal: Performs self-care activities at highest level of function for planned discharge setting  See evaluation for individualized goals  Description: Treatment Interventions: ADL retraining,UE strengthening/ROM,Functional transfer training,Endurance training,Patient/family training,Equipment evaluation/education,Compensatory technique education,Continued evaluation,Energy conservation,Activityengagement          See flowsheet documentation for full assessment, interventions and recommendations  Note: Limitation: Decreased ADL status,Decreased UE strength,Decreased endurance,Decreased self-care trans,Decreased high-level ADLs  Prognosis: Good  Assessment: Pt is a 76 y o  male seen for OT evaluation s/p adm to [facility] on 2/23/2022 w/ GARY superimposed on CKD and abnormal CT scan  CT with worsening diffuse bladder wall thickening, could be cystitis versus progression of tumor  Pt s/p B/L percutaneous nephrostomy tube placement 2/25/22  Comorbidities affecting pts functional performance include a significant PMH of Anemia, Anxiety, Arthritis, CKD, CAD, Depression, DM, Glaucoma, HLD, HTN, PAD, and Spinal stenosis  Pt with active OT orders and activity orders for Activity as tolerated  Pt lives with spouse and sister-in-law in a multi-level house with ramped entrance and stair glide to 2nd floor  Pt utilizes bathroom in basement w/ FOS to access  Pt reports (-) home alone  At baseline, pt was I w/ ADLs and functional transfers/mobility w/ use of Hurrycane  Family assists w/ IADLs  (+)   Denies falls PTA  Upon evaluation, pt currently requires Supervision for UB ADLs, Supervision for LB ADLs, Supervision for toileting, Supervision for bed mobility, and Supervision for functional mobility/transfers 2* the following deficits impacting occupational performance: decreased strength, decreased balance, decreased tolerance and increased pain   These impairments, as well at pts multiple lines, fall risk, steps within home environment, difficulty performing ADLS and difficulty performing IADLS  limit pts ability to safely engage in all baseline areas of occupation  Based on the aforementioned OT evaluation, functional performance deficits, and assessments, pt has been identified as a Moderate complexity evaluation  Pt to continue to benefit from continued acute OT services during hospital stay to address defined deficits and to maximize level of functional independence in the following Occupational Performance areas: grooming, bathing/shower, toilet hygiene, dressing, medication management, health maintenance, functional mobility, community mobility, clothing management and social participation  From OT standpoint, recommend Home OT upon D/C   OT will continue to follow pt 2-3x/wk to address the following goals to  w/in 10-14 days:     OT Discharge Recommendation: Home with home health rehabilitation  OT - OK to Discharge: Yes (when medically cleared)

## 2022-02-27 NOTE — PROGRESS NOTES
[address] [facility]  Progress Note - [facility] 1945, 76 y o  male MRN: [id number]  Unit/Bed#: E2 -01 Encounter: [id number]  Primary Care Provider: Carmen Goode MD   Date and time admitted to hospital: 2/23/2022  2:50 PM    * Abnormal CT scan, bladder  Assessment & Plan  · CT with worsening diffuse bladder wall thickening, could be cystitis versus progression of tumor  · Urine culture on 02/17/2022 showed growth of Enterococcus faecalis  · Urine culture on admission was negative for growth  · Intraoperative culture from the right nephrostomy, is growing Enterococcus faecalis  · Switch antibiotic to Levaquin (he has tolerated this in the past) since he has a stent there and urology is planning to remove the stent this week    Acute kidney injury superimposed on CKD ([facility] [address] )  Assessment & Plan  · GARY on CKD 4 secondary to obstructive uropathy  · Creatinine is worse today likely from postobstructive diuresis  · Restart IV fluids  · Repeat labs in a m  · Status post percutaneous nephrostomy tube placement on 02/25  · Pain control is adequate  · The right ureteral will be removed sometime next week, possibly inpatient    Type 2 diabetes mellitus, with long-term current use of insulin [facility])  Assessment & Plan  Lab Results   Component Value Date    HGBA1C 7 7 (H) 09/03/2021       Recent Labs     02/26/22  1545 02/26/22  2110 02/27/22  0614 02/27/22  1116   POCGLU 124 151* 131 150*     Continue Lantus with sliding scale insulin    Bladder carcinoma (HCC)  Assessment & Plan  · Stage II bladder carcinoma followed by Urology    · Status post recent TURBT 10/18/21  · Previously treated with BCG instillation  · Outpatient follow-up with urology for further treatment    Hypertension with renal disease  Assessment & Plan  · Continue metoprolol 100 mg daily    Hyperlipidemia  Assessment & Plan  · Continue Zetia daily    Coronary artery disease of native artery of native heart with stable angina pectoris ([facility] [address] )  Assessment & Plan  · Stable on baby aspirin and Toprol  mg daily      VTE Pharmacologic Prophylaxis:   Pharmacologic: Heparin  Mechanical VTE Prophylaxis in Place: No    Patient Centered Rounds: I have performed bedside rounds with nursing staff today  Education and Discussions with Family / Patient:  Spoke with granddaughter Lesa Rivero and gave update per patient request     Time Spent for Care: 20 minutes  More than 50% of total time spent on counseling and coordination of care as described above  Current Length of Stay: 4 day(s)    Current Patient Status: Inpatient   Certification Statement: The patient will continue to require additional inpatient hospital stay due to GARY    Discharge Plan:  May need rehab  PT OT pending    Code Status: Level 1 - Full Code      Subjective: Intermittent hypogastric pain  + feels weak overall but reports that he has been able to walk to and from the bathroom today  No fever    Objective:     Vitals:   Temp (24hrs), Av 8 °F (36 6 °C), Min:97 5 °F (36 4 °C), Max:98 1 °F (36 7 °C)    Temp:  [97 5 °F (36 4 °C)-98 1 °F (36 7 °C)] 98 1 °F (36 7 °C)  HR:  [61-70] 70  Resp:  [18] 18  BP: (111-149)/(70-78) 120/70  SpO2:  [97 %-98 %] 97 %  Body mass index is 30 67 kg/m²  Input and Output Summary (last 24 hours): Intake/Output Summary (Last 24 hours) at 2022 1218  Last data filed at 2022 0715  Gross per 24 hour   Intake 350 ml   Output 920 ml   Net -570 ml       Physical Exam:     Physical Exam  Vitals reviewed  Constitutional:       Appearance: He is not ill-appearing or diaphoretic  HENT:      Head: Normocephalic and atraumatic  Nose: No rhinorrhea  Eyes:      General: No scleral icterus  Cardiovascular:      Rate and Rhythm: Regular rhythm  Heart sounds: No murmur heard  No gallop  Pulmonary:      Effort: Pulmonary effort is normal  No respiratory distress  Breath sounds: No wheezing or rales  Abdominal:      General: Abdomen is flat  Palpations: Abdomen is soft  Comments: Mild hypogastric tenderness  Bilateral nephrostomies with clear yellow urine   Musculoskeletal:      Cervical back: Neck supple  Right lower leg: No edema  Left lower leg: No edema  Skin:     General: Skin is warm and dry  Neurological:      Mental Status: He is oriented to person, place, and time  Psychiatric:         Mood and Affect: Mood normal          Behavior: Behavior normal        Additional Data:     Labs:    Results from last 7 days   Lab Units 02/27/22  0510   WBC Thousand/uL 6 67   HEMOGLOBIN g/dL 8 2*   HEMATOCRIT % 23 6*   PLATELETS Thousands/uL 154   NEUTROS PCT % 67   LYMPHS PCT % 17   MONOS PCT % 13*   EOS PCT % 2     Results from last 7 days   Lab Units 02/27/22  0510 02/26/22  0817 02/25/22  0429   SODIUM mmol/L 136   < > 135*   POTASSIUM mmol/L 5 0   < > 5 4*   CHLORIDE mmol/L 107   < > 105   CO2 mmol/L 21   < > 20*   BUN mg/dL 52*   < > 59*   CREATININE mg/dL 3 63*   < > 3 72*   ANION GAP mmol/L 8   < > 10   CALCIUM mg/dL 8 3   < > 8 3   ALBUMIN g/dL  --   --  2 8*   TOTAL BILIRUBIN mg/dL  --   --  0 22   ALK PHOS U/L  --   --  211*   ALT U/L  --   --  87*   AST U/L  --   --  62*   GLUCOSE RANDOM mg/dL 111   < > 140    < > = values in this interval not displayed  Results from last 7 days   Lab Units 02/23/22  1521   INR  1 06     Results from last 7 days   Lab Units 02/27/22  1116 02/27/22  0614 02/26/22  2110 02/26/22  1545 02/26/22  1115 02/25/22  2104 02/25/22  1614 02/25/22  1240 02/25/22  0620 02/24/22  2100 02/24/22  1558 02/24/22  1117   POC GLUCOSE mg/dl 150* 131 151* 124 96 84 105 127 148* 210* 146* 170*         Results from last 7 days   Lab Units 02/23/22  1818 02/23/22  1521   LACTIC ACID mmol/L  --  1 1   PROCALCITONIN ng/ml 0 53* 0 53*           * I Have Reviewed All Lab Data Listed Above  * Additional Pertinent Lab Tests Reviewed:  All Labs Within Last 24 Hours Reviewed    Imaging:    Imaging Reports Reviewed Today Include:  No new imaging    Recent Cultures (last 7 days):     Results from last 7 days   Lab Units 02/25/22  1156 02/25/22  1134 02/23/22  1543 02/23/22  1521   BLOOD CULTURE   --   --   --  No Growth at 72 hrs  No Growth at 72 hrs     URINE CULTURE  10,000-19,000 cfu/ml  60,000-69,000 cfu/ml Enterococcus faecalis* No Growth <1000 cfu/mL  --        Last 24 Hours Medication List:   Current Facility-Administered Medications   Medication Dose Route Frequency Provider Last Rate    acetaminophen  650 mg Oral Q6H PRN Mikala Perez MD      ALPRAZolam  0 25 mg Oral BID PRN Mikala Perez MD      aspirin  81 mg Oral Daily Mikala Perez MD      bimatoprost  1 drop Both Eyes HS Mikala Perez MD      calcitriol  0 25 mcg Oral Daily Mikala Perez MD      docusate sodium  100 mg Oral BID PRN Mikala Perez MD      DULoxetine  20 mg Oral Daily Mikala Perez MD      ezetimibe  10 mg Oral Early Morning Mikala Perez MD      gabapentin  300 mg Oral HS Mikala Perez MD      heparin (porcine)  5,000 Units Subcutaneous [facility] Mikala Perez MD      HYDROcodone-acetaminophen  1 tablet Oral Q6H PRN Mikala Perez MD      HYDROmorphone  1 mg Intravenous Q3H PRN Wyvolili Chan, PA-C      insulin glargine  18 Units Subcutaneous HS Mikala Perez MD      insulin lispro  1-5 Units Subcutaneous TID [facility] Mikala Perez MD      isosorbide mononitrate  30 mg Oral Early Morning Mikala Perez MD      levofloxacin  250 mg Oral Q24H Antonette Oh MD      melatonin  6 mg Oral HS Roderick Chan, PA-C      metoprolol succinate  100 mg Oral Daily Mikala Perez MD      ondansetron  4 mg Intravenous Q6H PRN Mikala Perez MD      oxybutynin  5 mg Oral TID Mikala Perez MD      pantoprazole  40 mg Oral Early Morning Mikala Perez MD      sertraline  50 mg Oral Daily Mikala Perez MD      sodium chloride (PF)  3 mL Intravenous Q1H PRN Mikala Perez MD      sodium chloride  75 mL/hr Intravenous Continuous Juma Lui MD 75 mL/hr (02/27/22 0820)    tamsulosin  0 4 mg Oral HS Sonia Acuna MD          Today, Patient Was Seen By: Juma Lui MD    ** Please Note: Dictation voice to text software may have been used in the creation of this document   **

## 2022-02-27 NOTE — PHYSICAL THERAPY NOTE
PHYSICAL THERAPY EVALUATION          Patient Name: Francisco Zamora  [id number] Date: 2/27/2022   PT EVALUATION    76 y o     [id number]    Proctitis [K62 89]  Bladder cancer ([place] [address] ) [C67 9]  Cystitis [N30 90]  Groin pain [R10 30]  Bilateral hydronephrosis [N13 30]  GARY (acute kidney injury) ([place] [address] ) [N17 9]    Past Medical History:   Diagnosis Date    Anemia     Last assessed: 9/28/17    Anxiety     Arteriosclerotic cardiovascular disease     Last assessed: 9/28/17    Arthritis     Bladder cancer ([place] [address] )     bladder- had BCG treatments    Chronic kidney disease     Stage IV    CKD (chronic kidney disease) stage 4, GFR 15-29 ml/min ([facility])     Colon polyp     Coronary artery disease     7 stents    Depression     Diabetes mellitus ([facility])     IDDM    GERD (gastroesophageal reflux disease)     Glaucoma     Hematuria     History of fusion of cervical spine     Hyperlipidemia     Hypertension     Insomnia     Last assessed: 11/14/12    Loss of hearing     has hearing aids but usually does not wear them    Other seasonal allergic rhinitis     Last assessed: 2/10/16    PAD (peripheral artery disease) ([facility])     Shortness of breath     on exertion    Spinal stenosis of lumbar region     Transient cerebral ischemia     No Residual    Uses walker     w/c for longer distances         Past Surgical History:   Procedure Laterality Date    CARDIAC SURGERY      Cath stent placement  Last assessed: 3/9/17  Interventional Catheterization    CHOLECYSTECTOMY      COLONOSCOPY      CYSTOSCOPY      Diagnostic w/biopsy  Kera Hicks  Last assessed: 12/1/14    CYSTOSCOPY W/ URETERAL STENT PLACEMENT Bilateral 10/18/2021    Procedure: bilateral retrogrades, cytology collection;  Surgeon: Kera Martinez MD;  [facility];  Service: Urology    CYSTOURETHROSCOPY      w/cautery   Kera Hicks    FL RETROGRADE PYELOGRAM  10/18/2021    FL RETROGRADE PYELOGRAM  10/24/2021    GASTRIC BYPASS      For morbid obesity w/Shaji-en-Y   Resolved: 11/17/09    INCISION AND DRAINAGE OF WOUND Right 2/26/2017    Procedure: INCISION AND DRAINAGE (I&D) EXTREMITY WITH APPLICATION OF GRAFT JACKET;  Surgeon: Oralia Majano DPM;  [facility];  Service:     INCISION AND DRAINAGE OF WOUND Right 4/25/2017    Procedure: INCISION AND DRAINAGE (I&D) EXTREMITY, APPLICATION OF GRAFT;  Surgeon: Oralia Majano DPM;  [facility];  Service:     IR BIOPSY OTHER  7/2/2020    IR LOWER EXTREMITY ANGIOGRAM  2/8/2021    IR LOWER EXTREMITY ANGIOGRAM  2/11/2021    IR NEPHROSTOMY TUBE PLACEMENT  2/25/2022    IR PORT PLACEMENT  1/17/2022    IR TUNNELED CENTRAL LINE PLACEMENT  12/24/2020    JOINT REPLACEMENT      christofer knees replaced    TN AMPUTATION METATARSAL+TOE,SINGLE Left 12/21/2020    Procedure: RAY RESECTION FOOT;  Surgeon: Sujatha Shea DPM;  [facility];  Service: Podiatry    TN AMPUTATION METATARSAL+TOE,SINGLE Left 12/31/2020    Procedure: 5TH MET RESECTION;  Surgeon: Sujatha Shea DPM;  [facility];  Service: Podiatry    TN CYSTOURETHROSCOPY W/IRRIG & EVAC CLOTS N/A 2/10/2021    Procedure: CYSTOSCOPY EVACUATION OF CLOTS, fulguration;  Surgeon: Severa Chol, MD;  [facility];  Service: Urology    TN CYSTOURETHROSCOPY W/IRRIG & EVAC CLOTS N/A 10/24/2021    Procedure: CYSTOSCOPY EVACUATION OF CLOT, fulguration of bleeding vessels, right ureter stent placement, retrograde pyelogram;  Surgeon: Sanam Benitez MD;  [facility];  Service: Urology    TN CYSTOURETHROSCOPY,BIOPSY N/A 8/16/2016    Procedure: CYSTOSCOPY WITH BIOPSIES;  Surgeon: Cristobal Sin MD;  [facility];  Service: Urology    TN CYSTOURETHROSCOPY,FULGUR <0 5 CM LESN N/A 11/19/2020    Procedure: CYSTO W/BIOPSIES, transurethral prostate bx;  Surgeon: Yusuf Lala MD;  [facility];  Service: Urology    TN CYSTOURETHROSCOPY,FULGUR >5 CM LESN Bilateral 10/18/2021    Procedure: Idania Castanon RESECTION OF BLADDER TUMOR (TURBT); Surgeon: Jenifer Campos MD;  [facility];  Service: Urology    TN Palmer Multani 3RD+ ORD [address] PEL/LXTR [facility] Left 2/8/2021    Procedure: LEG angiogram, CO2 w/limited contrast with balloon angioplasty postertior tibial artery;  Surgeon: Kedar Guajardo MD;  [facility];  Service: Vascular    ROTATOR CUFF REPAIR      SMALL INTESTINE SURGERY      Surgery Shaji-en-Y    SPINAL FUSION      lumbar and cervical fusions    VAC DRESSING APPLICATION Right 2/22/1991    Procedure: APPLICATION VAC DRESSING;  Surgeon: Graciela Lee DPM;  [facility];  Service:    [address] [place] [address] Left 2/16/2021    Procedure: FOOT DEBRIDE, [address] OUT w/graft application;  Surgeon: Graciela Lee DPM;  [facility];  Service: Podiatry        02/27/22 0810   PT Last Visit   PT Visit Date 02/27/22   Note Type   Note type Evaluation   Pain Assessment   Pain Assessment Tool 0-10   Pain Score 8   Pain Location/Orientation Location: Groin   Effect of Pain on Daily Activities pt reporting increased back pain at site of drains w mobility   Hospital Pain Intervention(s) Repositioned; Ambulation/increased activity; Emotional support; Rest   Restrictions/Precautions   Other Precautions Multiple lines;Pain; Fall Risk   Home Living   Type of [address] [place] [address] Multi-level;1/2 bath on main level;Bed/bath upstairs; Ramped entrance; Work area in basement   [place] Grab bars in shower; Shower chair;Commode   Home Equipment Walker;Cane;Stair glide  (x2 hurrycanes)   Additional Comments ramped entrance  walks down full FOS to basement level to shower and perform ADLs  stair glide to second floor  Prior Function   Level of [place] Independent with ADLs and functional mobility; Needs assistance with IADLs   Lives With Spouse; Family  (sister in law)   Receives [facility] IADLs Needs assistance  (able to do light IADLs, fam helps w most/heavier IADLs)   Falls in the last 6 months 0   Vocational Retired   Comments indep at baseline with use of [employer]  family home to assist pt prn  +   General   Additional Pertinent History pt admitted 2/23/22 for abn CT scan of bladder  had IR nephrostomy tubes placed 2/24  activity as tolerated orders  pmhx significant for anxiety, CVD, CA, CKD, arthritis, CAD, depression, DM, PAD, spinal stenosis (lumbar)   Cognition   Overall Cognitive Status WFL   Arousal/Participation Cooperative   Orientation Level Oriented X4   Memory Within functional limits   Following Commands Follows all commands and directions without difficulty   RLE Assessment   RLE Assessment WFL  (4- to 4/5; proximal weakness >distal)   LLE Assessment   LLE Assessment WFL  (4- to 4/5; proximal weakness >distal)   Coordination   Sensation X  (bl from mid calf distal)   Bed Mobility   Supine to Sit 5  Supervision   Additional items HOB elevated; Bedrails; Increased time required   Sit to Supine 5  Supervision   Additional items HOB elevated; Increased time required   Transfers   Sit to Stand 5  Supervision   Additional items Bedrails   Stand to Sit 5  Supervision   Additional items Bedrails   Toilet transfer 5  Supervision   Additional items Increased time required;Standard toilet; Other  (grab bar, cane)   Ambulation/Elevation   Gait pattern Antalgic; Forward Flexion; Excessively slow; Wide ROSALIA   Gait Assistance 5  Supervision   Additional items Assist x 1  (assist for lines)   Assistive Device Straight cane  (hurrycane)   Distance 15', 50'   Stair Management Assistance Not tested   Balance   Static Standing Fair   Dynamic Standing Fair -   Ambulatory Fair -   Endurance Deficit   Endurance Deficit No   Activity Tolerance   Activity Tolerance Patient limited by pain   Medical Staff [address] OT   Nurse Made Aware Anabelle Pryor RN   Assessment   Prognosis Good   Problem List Decreased strength; Impaired balance;Decreased mobility; Decreased skin integrity;Pain; Impaired judgement;Decreased safety awareness; Impaired sensation   Assessment Megan Santos is a 76 y o  male admitted to [facility] on 2/23/2022 for Abnormal CT scan, bladder  PT was consulted and pt was seen on 2/27/2022 for mobility assessment and d/c planning  Pt presents w high fall risk, chronic pain of groin, acute pain back secondary to placement of nephrostomy tubes, multiple lines  At baseline is indep for ADLs and mobility w use of Hurrycane  Pt is currently functioning at a supervision assistance x1 level for bed mobility, transfers and ambulation w Hurrycane  Able to ambulate household distances at this time  Further assessment of mobility (steps, ambulation community distances) limited by pain  Occ unsteady during dynamic standing tasks but no gross LOB noted  Pt will benefit from continued skilled IP PT to address the above mentioned impairments of balance, mobility in order to maximize recovery and increase functional independence when completing mobility and ADLs  At this time PT recommendations for d/c are home w HHPT when medically stable  Barriers to Discharge None   Goals   Patient Goals go home   STG Expiration Date 03/09/22   Short Term Goal #1 1)  Pt will perform bed mobility with Rena demonstrating appropriate technique 100% of the time in order to improve function  2)  Perform all transfers with Rena demonstrating safe and appropriate technique 100% of the time in order to improve ability to negotiate safely in home environment  3) Amb with least restrictive AD > 50'x1 with mod I in order to demonstrate ability to negotiate in home environment  4)  Improve overall strength and balance 1/2 grade in order to optimize ability to perform functional tasks and reduce fall risk  5) Increase activity tolerance to 45 minutes in order to improve endurance to functional tasks  6)  Negotiate stairs using most appropriate technique and S in order to be able to negotiate safely in home environment  7) PT for ongoing patient and family/caregiver education, DME needs and d/c planning in order to promote highest level of function in least restrictive environment  Plan   Treatment/Interventions Functional transfer training;LE strengthening/ROM; Elevations; Therapeutic exercise;Patient/family training;Equipment eval/education; Bed mobility;Gait training; Compensatory technique education;Continued evaluation;Spoke to nursing;OT   PT Frequency 2-3x/wk   Recommendation   PT Discharge Recommendation Home with home health rehabilitation   Additional Comments The patient's AM-PAC Basic Mobility Inpatient Short Form Raw Score is 19  A Raw score of greater than 16 suggests the patient may benefit from discharge to home  Please also refer to the recommendation of the Physical Therapist for safe discharge planning     AM-PAC Basic Mobility Inpatient   Turning in Bed Without Bedrails 4   Lying on Back to Sitting on Edge of Flat Bed 3   Moving Bed to Chair 3   Standing Up From Chair 3   Walk in Room 3   Climb 3-5 Stairs 3   Basic Mobility Inpatient Raw Score 19   Basic Mobility Standardized Score 42 48   Highest Level Of Mobility   -HL Goal 6: Walk 10 steps or more   JH-HLM Highest Level of Mobility 7: Walk 25 feet or more   JH-HLM Goal Achieved Yes   History: co - morbidities, social background (steps), fall risk, use of assistive device, assist for iadl's, multiple lines  Exam: impairments in systems including musculoskeletal (strength), neuromuscular (balance, gait, transfers, motor function and sensation), am-pac, cognition  Clinical: unstable/unpredictable  Complexity:high      Excell Keyla, PT

## 2022-02-27 NOTE — PROGRESS NOTES
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  · Currently undergoing radiation and chemotherapy  · Retained right ureteral stent from 10/24/21  · Moderate bilateral hydronephrosis likely secondary to related bladder pathology  Cannot exclude right ureteral stent malfunction  · Creatinine 4 04--3 87--3 7--3 6  Recent baseline around 3 1-3 5  · IR was consulted   · Bbilateral PCN placement   · Left 795 ml over 24 hours, clear yellow   Patient emptied once on own with no measurement   · Right 675 ml over 24 hours, clear yellow  Patient emptied once on own with no measurement   · If patient does well with PCNs, we may remove right ureteral stent early this week     * Abnormal CT scan, bladder  Assessment & Plan  · Urine culture 2/17 with 70-79K of Enterococcus faecalis  · Levaquin 500 mg po daily x 7 days sent to pharmacy on 2/20  · On CT in ED, marked worsening of diffuse bladder wall thickening most likely representing severe cystitis  Cannot rule out progression of bladder tumor  · Blood cultures no growth at 72 hours         Subjective:   HPI:  Patient states he is still having occasional penile pain, this has been intermittent and ongoing for several weeks  Has been up oob and doing well  No issues overnight with PCN bags  No further complaints  Objective:    Vitals: Blood pressure 120/70, pulse 70, temperature 98 1 °F (36 7 °C), temperature source Temporal, resp  rate 18, weight 104 kg (229 lb 4 5 oz), SpO2 97 %  ,Body mass index is 30 67 kg/m²  Physical Exam  Vitals reviewed  Constitutional:       Appearance: He is obese  He is not ill-appearing  HENT:      Head: Normocephalic and atraumatic  Cardiovascular:      Rate and Rhythm: Normal rate     Pulmonary: Effort: Pulmonary effort is normal    Abdominal:      General: Bowel sounds are normal  There is no distension  Tenderness: There is no abdominal tenderness  Genitourinary:     Comments: BL PCN draining clear yellow urine   Musculoskeletal:         General: Normal range of motion  Cervical back: Normal range of motion  Skin:     General: Skin is warm and dry  Neurological:      Mental Status: He is alert and oriented to person, place, and time  Psychiatric:         Mood and Affect: Mood normal          Behavior: Behavior normal          Thought Content:  Thought content normal          Judgment: Judgment normal            Labs:  Recent Labs     02/25/22  0429 02/26/22  0817 02/27/22  0510   WBC 4 86 6 28 6 67       Recent Labs     02/25/22  0429 02/26/22  0817 02/27/22  0510   HGB 9 2* 8 2* 8 2*     Recent Labs     02/25/22  0429 02/26/22  0817 02/27/22  0510   HCT 28 0* 23 5* 23 6*     Recent Labs     02/25/22  0429 02/26/22  0817 02/27/22  0510   CREATININE 3 72* 3 43* 3 63*     Urine Culture: Growth: enterococcus    History:    Past Medical History:   Diagnosis Date    Anemia     Last assessed: 9/28/17    Anxiety     Arteriosclerotic cardiovascular disease     Last assessed: 9/28/17    Arthritis     Bladder cancer ([facility] [address] )     bladder- had BCG treatments    Chronic kidney disease     Stage IV    CKD (chronic kidney disease) stage 4, GFR 15-29 ml/min ([facility])     Colon polyp     Coronary artery disease     7 stents    Depression     Diabetes mellitus ([facility])     IDDM    GERD (gastroesophageal reflux disease)     Glaucoma     Hematuria     History of fusion of cervical spine     Hyperlipidemia     Hypertension     Insomnia     Last assessed: 11/14/12    Loss of hearing     has hearing aids but usually does not wear them    Other seasonal allergic rhinitis     Last assessed: 2/10/16    PAD (peripheral artery disease) ([facility])     Shortness of breath     on exertion    Spinal stenosis of lumbar region     Transient cerebral ischemia     No Residual    Uses walker     w/c for longer distances     Social History     Socioeconomic History    Marital status: /Civil Union     Spouse name: None    Number of children: None    Years of education: None    Highest education level: None   Occupational History    None   Tobacco Use    Smoking status: Former Smoker     Packs/day: 3 00     Years: 27 00     Pack years: 81 00     Types: Cigarettes     Quit date: 1970     Years since quittin 1    Smokeless tobacco: Never Used   Vaping Use    Vaping Use: Never used   Substance and Sexual Activity    Alcohol use: Not Currently     Comment: beer / liquor    Drug use: Not Currently     Types: Marijuana     Comment: quit 2019 had medical marijuana    Sexual activity: Not Currently   Other Topics Concern    None   Social History Narrative    Consumes 1 cup of coffee and 1 soda per day     Social Determinants of Health     Financial Resource Strain: Not on file   Food Insecurity: Not on file   Transportation Needs: Not on file   Physical Activity: Not on file   Stress: Not on file   Social Connections: Not on file   Intimate Partner Violence: Not on file   Housing Stability: Not on file     Past Surgical History:   Procedure Laterality Date    CARDIAC SURGERY      Cath stent placement  Last assessed: 3/9/17  Interventional Catheterization    CHOLECYSTECTOMY      COLONOSCOPY      CYSTOSCOPY      Diagnostic w/biopsy  Vincenzo Call  Last assessed: 14    CYSTOSCOPY W/ URETERAL STENT PLACEMENT Bilateral 10/18/2021    Procedure: bilateral retrogrades, cytology collection;  Surgeon: Joe Haynes MD;  [facility]: [facility] MAIN OR;  Service: Urology    CYSTOURETHROSCOPY      w/cautery  Vincenzo Call    FL RETROGRADE PYELOGRAM  10/18/2021    FL RETROGRADE PYELOGRAM  10/24/2021    GASTRIC BYPASS      For morbid obesity w/Shaji-en-Y   Resolved: 09    INCISION AND DRAINAGE OF WOUND Right 2/26/2017    Procedure: INCISION AND DRAINAGE (I&D) EXTREMITY WITH APPLICATION OF GRAFT JACKET;  Surgeon: Yanely Mccray DPM;  [facility];  Service:     INCISION AND DRAINAGE OF WOUND Right 4/25/2017    Procedure: INCISION AND DRAINAGE (I&D) EXTREMITY, APPLICATION OF GRAFT;  Surgeon: Yanely Mccray DPM;  [facility];  Service:     IR BIOPSY OTHER  7/2/2020    IR LOWER EXTREMITY ANGIOGRAM  2/8/2021    IR LOWER EXTREMITY ANGIOGRAM  2/11/2021    IR NEPHROSTOMY TUBE PLACEMENT  2/25/2022    IR PORT PLACEMENT  1/17/2022    IR TUNNELED CENTRAL LINE PLACEMENT  12/24/2020    JOINT REPLACEMENT      christofer knees replaced    WV AMPUTATION METATARSAL+TOE,SINGLE Left 12/21/2020    Procedure: RAY RESECTION FOOT;  Surgeon: Lana Enriquez DPM;  [facility];  Service: Podiatry    WV AMPUTATION METATARSAL+TOE,SINGLE Left 12/31/2020    Procedure: 5TH MET RESECTION;  Surgeon: Lana Enriquez DPM;  [facility];  Service: Podiatry    WV CYSTOURETHROSCOPY W/IRRIG & EVAC CLOTS N/A 2/10/2021    Procedure: CYSTOSCOPY EVACUATION OF CLOTS, fulguration;  Surgeon: Bandar Murphy MD;  [facility];  Service: Urology    WV CYSTOURETHROSCOPY W/IRRIG & EVAC CLOTS N/A 10/24/2021    Procedure: CYSTOSCOPY EVACUATION OF CLOT, fulguration of bleeding vessels, right ureter stent placement, retrograde pyelogram;  Surgeon: Goldie Castro MD;  Location[facility] MAIN OR;  Service: Urology    WV CYSTOURETHROSCOPY,BIOPSY N/A 8/16/2016    Procedure: CYSTOSCOPY WITH BIOPSIES;  Surgeon: Chris Sheehan MD;  Location: [facility] MAIN OR;  Service: Urology    WV CYSTOURETHROSCOPY,FULGUR <0 5 CM LESN N/A 11/19/2020    Procedure: CYSTO W/BIOPSIES, transurethral prostate bx;  Surgeon: Leisa Severs, MD;  [facility];  Service: Urology    WV CYSTOURETHROSCOPY,FULGUR >5 CM LESN Bilateral 10/18/2021    Procedure: TRANSURETHRAL RESECTION OF BLADDER TUMOR (TURBT);   Surgeon: Mando Ramires MD;  Location[facility] OR;  Service: Urology    [facility] Alex Fisher 3RD+ ORD SLCTV ABDL PEL/LXTR [facility] Left 2/8/2021    Procedure: LEG angiogram, CO2 w/limited contrast with balloon angioplasty postertior tibial artery;  Surgeon: Shelly Goddard MD;  [facility];  Service: Vascular    ROTATOR CUFF REPAIR      SMALL INTESTINE SURGERY      Surgery Shaji-en-Y    SPINAL FUSION      lumbar and cervical fusions    VAC DRESSING APPLICATION Right 0/09/8382    Procedure: APPLICATION VAC DRESSING;  Surgeon: Dayanna Forrest DPM;  [facility];  Service:     WOUND DEBRIDEMENT Left 2/16/2021    Procedure: FOOT DEBRIDE, [address] OUT w/graft application;  Surgeon: Dayanna Forrest DPM;  [facility];  Service: Podiatry     Family History   Problem Relation Age of Onset    Diabetes Mother     Heart disease Mother     Other Mother         High blood pressure    Heart disease Father     Diabetes Sister     Other Sister         High blood pressure    Kidney disease Sister     Heart disease Brother     Other Brother         High blood pressure       EVANGELINA Olvera  Date: 2/27/2022 Time: 9:11 AM

## 2022-02-27 NOTE — ASSESSMENT & PLAN NOTE
· GARY on CKD 4 secondary to obstructive uropathy  · Creatinine is worse today likely from postobstructive diuresis  · Restart IV fluids  · Repeat labs in a m  · Status post percutaneous nephrostomy tube placement on 02/25  · Pain control is adequate    · The right ureteral will be removed sometime next week, possibly inpatient

## 2022-02-28 ENCOUNTER — ANESTHESIA EVENT (INPATIENT)
Dept: PERIOP | Facility: HOSPITAL | Age: 77
DRG: 690 | End: 2022-02-28
Payer: MEDICARE

## 2022-02-28 PROBLEM — Z96.0 RETAINED URETERAL STENT: Chronic | Status: ACTIVE | Noted: 2022-02-28

## 2022-02-28 LAB
ANION GAP SERPL CALCULATED.3IONS-SCNC: 11 MMOL/L (ref 4–13)
BACTERIA BLD CULT: NORMAL
BACTERIA BLD CULT: NORMAL
BASOPHILS # BLD AUTO: 0.02 THOUSANDS/ΜL (ref 0–0.1)
BASOPHILS NFR BLD AUTO: 0 % (ref 0–1)
BUN SERPL-MCNC: 51 MG/DL (ref 5–25)
CALCIUM SERPL-MCNC: 8.4 MG/DL (ref 8.3–10.1)
CHLORIDE SERPL-SCNC: 109 MMOL/L (ref 100–108)
CO2 SERPL-SCNC: 20 MMOL/L (ref 21–32)
CREAT SERPL-MCNC: 3.39 MG/DL (ref 0.6–1.3)
EOSINOPHIL # BLD AUTO: 0.2 THOUSAND/ΜL (ref 0–0.61)
EOSINOPHIL NFR BLD AUTO: 3 % (ref 0–6)
ERYTHROCYTE [DISTWIDTH] IN BLOOD BY AUTOMATED COUNT: 15.7 % (ref 11.6–15.1)
GFR SERPL CREATININE-BSD FRML MDRD: 16 ML/MIN/1.73SQ M
GLUCOSE SERPL-MCNC: 104 MG/DL (ref 65–140)
GLUCOSE SERPL-MCNC: 118 MG/DL (ref 65–140)
GLUCOSE SERPL-MCNC: 181 MG/DL (ref 65–140)
GLUCOSE SERPL-MCNC: 64 MG/DL (ref 65–140)
GLUCOSE SERPL-MCNC: 65 MG/DL (ref 65–140)
GLUCOSE SERPL-MCNC: 91 MG/DL (ref 65–140)
HCT VFR BLD AUTO: 23.7 % (ref 36.5–49.3)
HGB BLD-MCNC: 8 G/DL (ref 12–17)
IMM GRANULOCYTES # BLD AUTO: 0.04 THOUSAND/UL (ref 0–0.2)
IMM GRANULOCYTES NFR BLD AUTO: 1 % (ref 0–2)
LYMPHOCYTES # BLD AUTO: 1.24 THOUSANDS/ΜL (ref 0.6–4.47)
LYMPHOCYTES NFR BLD AUTO: 20 % (ref 14–44)
MCH RBC QN AUTO: 31.3 PG (ref 26.8–34.3)
MCHC RBC AUTO-ENTMCNC: 33.8 G/DL (ref 31.4–37.4)
MCV RBC AUTO: 93 FL (ref 82–98)
MONOCYTES # BLD AUTO: 1.05 THOUSAND/ΜL (ref 0.17–1.22)
MONOCYTES NFR BLD AUTO: 17 % (ref 4–12)
NEUTROPHILS # BLD AUTO: 3.68 THOUSANDS/ΜL (ref 1.85–7.62)
NEUTS SEG NFR BLD AUTO: 59 % (ref 43–75)
NRBC BLD AUTO-RTO: 0 /100 WBCS
PLATELET # BLD AUTO: 159 THOUSANDS/UL (ref 149–390)
PMV BLD AUTO: 10.3 FL (ref 8.9–12.7)
POTASSIUM SERPL-SCNC: 4.5 MMOL/L (ref 3.5–5.3)
RBC # BLD AUTO: 2.56 MILLION/UL (ref 3.88–5.62)
SODIUM SERPL-SCNC: 140 MMOL/L (ref 136–145)
WBC # BLD AUTO: 6.23 THOUSAND/UL (ref 4.31–10.16)

## 2022-02-28 PROCEDURE — 99232 SBSQ HOSP IP/OBS MODERATE 35: CPT | Performed by: PHYSICIAN ASSISTANT

## 2022-02-28 PROCEDURE — 85025 COMPLETE CBC W/AUTO DIFF WBC: CPT | Performed by: INTERNAL MEDICINE

## 2022-02-28 PROCEDURE — 82948 REAGENT STRIP/BLOOD GLUCOSE: CPT

## 2022-02-28 PROCEDURE — 99232 SBSQ HOSP IP/OBS MODERATE 35: CPT | Performed by: INTERNAL MEDICINE

## 2022-02-28 PROCEDURE — 80048 BASIC METABOLIC PNL TOTAL CA: CPT | Performed by: INTERNAL MEDICINE

## 2022-02-28 RX ORDER — INSULIN GLARGINE 100 [IU]/ML
10 INJECTION, SOLUTION SUBCUTANEOUS
Status: DISCONTINUED | OUTPATIENT
Start: 2022-02-28 | End: 2022-03-02 | Stop reason: HOSPADM

## 2022-02-28 RX ADMIN — HYDROMORPHONE HYDROCHLORIDE 1 MG: 1 INJECTION, SOLUTION INTRAMUSCULAR; INTRAVENOUS; SUBCUTANEOUS at 20:09

## 2022-02-28 RX ADMIN — OXYBUTYNIN CHLORIDE 5 MG: 5 TABLET ORAL at 15:50

## 2022-02-28 RX ADMIN — HEPARIN SODIUM 5000 UNITS: 5000 INJECTION INTRAVENOUS; SUBCUTANEOUS at 21:31

## 2022-02-28 RX ADMIN — HYDROMORPHONE HYDROCHLORIDE 1 MG: 1 INJECTION, SOLUTION INTRAMUSCULAR; INTRAVENOUS; SUBCUTANEOUS at 08:55

## 2022-02-28 RX ADMIN — GABAPENTIN 300 MG: 300 CAPSULE ORAL at 21:30

## 2022-02-28 RX ADMIN — MELATONIN 6 MG: at 21:30

## 2022-02-28 RX ADMIN — BIMATOPROST 1 DROP: 0.1 SOLUTION/ DROPS OPHTHALMIC at 21:30

## 2022-02-28 RX ADMIN — OXYBUTYNIN CHLORIDE 5 MG: 5 TABLET ORAL at 20:09

## 2022-02-28 RX ADMIN — SODIUM CHLORIDE 75 ML/HR: 0.9 INJECTION, SOLUTION INTRAVENOUS at 21:36

## 2022-02-28 RX ADMIN — HYDROMORPHONE HYDROCHLORIDE 1 MG: 1 INJECTION, SOLUTION INTRAMUSCULAR; INTRAVENOUS; SUBCUTANEOUS at 04:15

## 2022-02-28 RX ADMIN — HYDROCODONE BITARTRATE AND ACETAMINOPHEN 1 TABLET: 5; 325 TABLET ORAL at 15:50

## 2022-02-28 RX ADMIN — ASPIRIN 81 MG: 81 TABLET, COATED ORAL at 08:54

## 2022-02-28 RX ADMIN — ISOSORBIDE MONONITRATE 30 MG: 30 TABLET, EXTENDED RELEASE ORAL at 06:11

## 2022-02-28 RX ADMIN — CALCITRIOL CAPSULES 0.25 MCG 0.25 MCG: 0.25 CAPSULE ORAL at 08:54

## 2022-02-28 RX ADMIN — ACETAMINOPHEN 650 MG: 325 TABLET, FILM COATED ORAL at 20:09

## 2022-02-28 RX ADMIN — OXYBUTYNIN CHLORIDE 5 MG: 5 TABLET ORAL at 08:55

## 2022-02-28 RX ADMIN — TAMSULOSIN HYDROCHLORIDE 0.4 MG: 0.4 CAPSULE ORAL at 21:30

## 2022-02-28 RX ADMIN — SODIUM CHLORIDE 75 ML/HR: 0.9 INJECTION, SOLUTION INTRAVENOUS at 09:22

## 2022-02-28 RX ADMIN — METOPROLOL SUCCINATE 100 MG: 50 TABLET, EXTENDED RELEASE ORAL at 08:54

## 2022-02-28 RX ADMIN — HEPARIN SODIUM 5000 UNITS: 5000 INJECTION INTRAVENOUS; SUBCUTANEOUS at 06:12

## 2022-02-28 RX ADMIN — DOCUSATE SODIUM 50MG AND SENNOSIDES 8.6MG 1 TABLET: 8.6; 5 TABLET, FILM COATED ORAL at 08:54

## 2022-02-28 RX ADMIN — DULOXETINE HYDROCHLORIDE 20 MG: 20 CAPSULE, DELAYED RELEASE PELLETS ORAL at 08:55

## 2022-02-28 RX ADMIN — SERTRALINE HYDROCHLORIDE 50 MG: 50 TABLET ORAL at 08:54

## 2022-02-28 RX ADMIN — DOCUSATE SODIUM 50MG AND SENNOSIDES 8.6MG 1 TABLET: 8.6; 5 TABLET, FILM COATED ORAL at 17:36

## 2022-02-28 RX ADMIN — EZETIMIBE 10 MG: 10 TABLET ORAL at 06:11

## 2022-02-28 RX ADMIN — PANTOPRAZOLE SODIUM 40 MG: 40 TABLET, DELAYED RELEASE ORAL at 06:11

## 2022-02-28 RX ADMIN — LEVOFLOXACIN 250 MG: 250 TABLET, FILM COATED ORAL at 11:52

## 2022-02-28 RX ADMIN — HYDROCODONE BITARTRATE AND ACETAMINOPHEN 1 TABLET: 5; 325 TABLET ORAL at 06:11

## 2022-02-28 RX ADMIN — INSULIN GLARGINE 10 UNITS: 100 INJECTION, SOLUTION SUBCUTANEOUS at 21:31

## 2022-02-28 RX ADMIN — HEPARIN SODIUM 5000 UNITS: 5000 INJECTION INTRAVENOUS; SUBCUTANEOUS at 15:16

## 2022-02-28 NOTE — ASSESSMENT & PLAN NOTE
· GARY on CKD 4 secondary to obstructive uropathy    · Status post percutaneous nephrostomy tube placement on 02/25 and Shirley catheter placement  · Continue to hold nephrotoxins  · Continue IV fluids  · Urology with plan to remove right ureteral stent 2/29/2022  · NPO midnight  · Trend BMP

## 2022-02-28 NOTE — PROGRESS NOTES
[address]  Progress Note - Alena De Leon 1945, 76 y o  male MRN: [id number]  Unit/Bed#: E2 -01 Encounter: [id number]  Primary Care Provider: Franko Valadez MD   Date and time admitted to hospital: 2/23/2022  2:50 PM    Acute kidney injury superimposed on CKD [facility])  Assessment & Plan  · GARY on CKD 4 secondary to obstructive uropathy  · Status post percutaneous nephrostomy tube placement on 02/25 and Shirley catheter placement  · Continue to hold nephrotoxins  · Continue IV fluids  · Urology with plan to remove right ureteral stent 2/29/2022  · NPO midnight  · Trend BMP    Type 2 diabetes mellitus, with long-term current use of insulin [facility])  Assessment & Plan  Lab Results   Component Value Date    HGBA1C 7 7 (H) 09/03/2021       Recent Labs     02/27/22  2108 02/28/22  0608 02/28/22  0633 02/28/22  1122   POCGLU 129 64* 104 91     A m  Hypoglycemia  Decrease Lantus to 10 units q h s  As patient will be NPO    Hyperlipidemia  Assessment & Plan  · Continue Zetia daily    Hypertension with renal disease  Assessment & Plan  · Continue metoprolol 100 mg daily    Bladder carcinoma (HCC)  Assessment & Plan  · Stage II bladder carcinoma followed by Urology  · Status post recent TURBT 10/18/21  · Previously treated with BCG instillation  · Outpatient follow-up with urology for further treatment    Coronary artery disease of native artery of native heart with stable angina pectoris ([address] )  Assessment & Plan  · Stable no chest pain  · Continue aspirin and Toprol  mg daily    * Abnormal CT scan, bladder  Assessment & Plan  · CT with worsening diffuse bladder wall thickening, could be cystitis versus progression of tumor  · Urine culture on 02/17/2022 showed growth of Enterococcus faecalis  · Urine culture on admission was negative for growth  · Intraoperative culture from the right nephrostomy, is growing Enterococcus faecalis  · Patient is likely colonized     Would opt to continue treatment with Levaquin given plan for invasive urologic procedure  VTE Pharmacologic Prophylaxis:  Heparin    Patient Centered Rounds:  Patient care rounds were performed with nursing    Discussions with Specialists or Other Care Team Provider:  Urology    Education and Discussions with Family / Patient:  Updated patient and plan of care    Time Spent for Care: 30  More than 50% of total time spent on counseling and coordination of care as described above  Current Length of Stay: 5 day(s)    Current Patient Status: Inpatient   Certification Statement: The patient will continue to require additional inpatient hospital stay due to plan for inpatient procedure    Discharge Plan:  Pending urologic procedure, stability of renal function    Code Status: Level 1 - Full Code      Subjective:   Patient seen evaluated at bedside  Reports some frequent groin pain and low back pain  Objective:     Vitals:   Temp (24hrs), Av 5 °F (36 4 °C), Min:97 3 °F (36 3 °C), Max:97 8 °F (36 6 °C)    Temp:  [97 3 °F (36 3 °C)-97 8 °F (36 6 °C)] 97 3 °F (36 3 °C)  HR:  [68-85] 85  Resp:  [16-20] 16  BP: (125-147)/(75-89) 125/75  SpO2:  [97 %-100 %] 98 %  Body mass index is 30 67 kg/m²  Input and Output Summary (last 24 hours): Intake/Output Summary (Last 24 hours) at 2022 1404  Last data filed at 2022 2228  Gross per 24 hour   Intake 1897 5 ml   Output 2225 ml   Net -327 5 ml       Physical Exam:     Physical Exam  Vitals reviewed  Constitutional:       General: He is not in acute distress  Appearance: He is well-developed  He is not ill-appearing, toxic-appearing or diaphoretic  HENT:      Head: Normocephalic and atraumatic  Mouth/Throat:      Mouth: Mucous membranes are moist       Pharynx: No oropharyngeal exudate  Eyes:      General: No scleral icterus  Extraocular Movements: Extraocular movements intact        Conjunctiva/sclera: Conjunctivae normal    Cardiovascular: Rate and Rhythm: Normal rate and regular rhythm  Heart sounds: Normal heart sounds  Pulmonary:      Effort: Pulmonary effort is normal  No respiratory distress  Breath sounds: Normal breath sounds  No wheezing or rales  Abdominal:      General: There is no distension  Palpations: Abdomen is soft  Tenderness: There is no abdominal tenderness  There is no guarding or rebound  Musculoskeletal:         General: No swelling, tenderness or deformity  Skin:     General: Skin is warm and dry  Neurological:      General: No focal deficit present  Mental Status: He is alert  Mental status is at baseline  Psychiatric:         Mood and Affect: Mood normal          Behavior: Behavior normal          Thought Content: Thought content normal          Judgment: Judgment normal          Additional Data:     Labs: I have reviewed pertinent results     Results from last 7 days   Lab Units 02/28/22  0423   WBC Thousand/uL 6 23   HEMOGLOBIN g/dL 8 0*   HEMATOCRIT % 23 7*   PLATELETS Thousands/uL 159   NEUTROS PCT % 59   LYMPHS PCT % 20   MONOS PCT % 17*   EOS PCT % 3     Results from last 7 days   Lab Units 02/28/22  0423 02/26/22  0817 02/25/22  0429   SODIUM mmol/L 140   < > 135*   POTASSIUM mmol/L 4 5   < > 5 4*   CHLORIDE mmol/L 109*   < > 105   CO2 mmol/L 20*   < > 20*   BUN mg/dL 51*   < > 59*   CREATININE mg/dL 3 39*   < > 3 72*   ANION GAP mmol/L 11   < > 10   CALCIUM mg/dL 8 4   < > 8 3   ALBUMIN g/dL  --   --  2 8*   TOTAL BILIRUBIN mg/dL  --   --  0 22   ALK PHOS U/L  --   --  211*   ALT U/L  --   --  87*   AST U/L  --   --  62*   GLUCOSE RANDOM mg/dL 65   < > 140    < > = values in this interval not displayed       Results from last 7 days   Lab Units 02/23/22  1521   INR  1 06     Results from last 7 days   Lab Units 02/28/22  1122 02/28/22  8447 02/28/22  8700 02/27/22  2108 02/27/22  1534 02/27/22  1116 02/27/22  4234 02/26/22  2110 02/26/22  1545 02/26/22  1115 02/25/22  2104 02/25/22  1614   POC GLUCOSE mg/dl 91 104 64* 129 165* 150* 131 151* 124 96 84 105         Results from last 7 days   Lab Units 02/23/22  1818 02/23/22  1521   LACTIC ACID mmol/L  --  1 1   PROCALCITONIN ng/ml 0 53* 0 53*         Imaging: I have reviewed pertinent imaging       Recent Cultures (last 7 days):     Results from last 7 days   Lab Units 02/25/22  1156 02/25/22  1134 02/23/22  1543 02/23/22  1521   BLOOD CULTURE   --   --   --  No Growth After 4 Days  No Growth After 4 Days     URINE CULTURE  10,000-19,000 cfu/ml  60,000-69,000 cfu/ml Enterococcus faecalis* No Growth <1000 cfu/mL  --        Last 24 Hours Medication List:   Current Facility-Administered Medications   Medication Dose Route Frequency Provider Last Rate    acetaminophen  650 mg Oral Q6H PRN Poncho Garcia MD      ALPRAZolam  0 25 mg Oral BID PRN Poncho Garcia MD      aspirin  81 mg Oral Daily Poncho Garcia MD      bimatoprost  1 drop Both Eyes HS Poncho Garcia MD      calcitriol  0 25 mcg Oral Daily Poncho Garcia MD      docusate sodium  100 mg Oral BID PRN Poncho Garcia MD      DULoxetine  20 mg Oral Daily Poncho Garcia MD      ezetimibe  10 mg Oral Early Morning Poncho Garcia MD      gabapentin  300 mg Oral HS Poncho Garcia MD      heparin (porcine)  5,000 Units Subcutaneous [facility] Poncho Garcia MD      HYDROcodone-acetaminophen  1 tablet Oral Q6H PRN Poncho Garcia MD      HYDROmorphone  1 mg Intravenous Q3H PRN [facility], PA-C      insulin glargine  10 Units Subcutaneous HS Marizol Kwan DO      insulin lispro  1-5 Units Subcutaneous TID AC Poncho Garcia MD      isosorbide mononitrate  30 mg Oral Early Morning Poncho Garcia MD      levofloxacin  250 mg Oral Q24H Myriam Mccoy MD      melatonin  6 mg Oral HS [facility], PA-C      metoprolol succinate  100 mg Oral Daily Poncho Garcia MD      ondansetron  4 mg Intravenous Q6H PRN Poncho Garcia MD      oxybutynin  5 mg Oral TID Poncho Garcia MD  pantoprazole  40 mg Oral Early Morning Eva Jaquez MD      senna-docusate sodium  1 tablet Oral BID Yen Johnson PA-C      sertraline  50 mg Oral Daily Eva Jaquez MD      sodium chloride (PF)  3 mL Intravenous Q1H PRN Eva Jaquez MD      sodium chloride  75 mL/hr Intravenous Continuous Thea Angeles MD 75 mL/hr (02/28/22 5903)   Melissa Alfred tamsulosin  0 4 mg Oral HS Eva Jaquez MD          Today, Patient Was Seen By: Mandeep Julian DO    ** Please Note: Dictation voice to text software may have been used in the creation of this document   **

## 2022-02-28 NOTE — ASSESSMENT & PLAN NOTE
Lab Results   Component Value Date    HGBA1C 7 7 (H) 09/03/2021       Recent Labs     02/27/22  2108 02/28/22  0608 02/28/22  0633 02/28/22  1122   POCGLU 129 64* 104 91     A m  Hypoglycemia  Decrease Lantus to 10 units q h s   As patient will be NPO

## 2022-02-28 NOTE — ANESTHESIA PREPROCEDURE EVALUATION
Procedure:  CYSTO; stent removal retrograde (Right Ureter)    Relevant Problems   CARDIO   (+) Coronary artery disease of native artery of native heart with stable angina pectoris (HCC)   (+) Hyperlipidemia   (+) Hypertension with renal disease   (+) Stable angina pectoris (HCC)      ENDO   (+) Secondary renal hyperparathyroidism (HCC)   (+) Type 2 diabetes mellitus, with long-term current use of insulin (HCC)      /RENAL   (+) Acute kidney injury superimposed on CKD (HCC)   (+) Acute renal failure (ARF) (HCC)      HEMATOLOGY   (+) Anemia of chronic disease   (+) Chronic anemia   (+) Thrombocytopenia (HCC)      MUSCULOSKELETAL   (+) Back pain   (+) Degeneration of lumbar intervertebral disc   (+) Lumbar spondylosis   (+) Primary osteoarthritis of left knee      NEURO/PSYCH   (+) Chronic pain syndrome   (+) Continuous opioid dependence ([facility])   (+) Diabetic polyneuropathy associated with type 2 diabetes mellitus ([facility])   (+) Moderate major depression, single episode ([facility])   (+) Stable angina pectoris (HCC)      PULMONARY   (+) Chronic bronchitis ([facility])        Physical Exam    Airway    Mallampati score: II  TM Distance: >3 FB  Neck ROM: full     Dental   No notable dental hx     Cardiovascular  Rhythm: regular, Rate: normal,     Pulmonary  Breath sounds clear to auscultation,     Other Findings        Anesthesia Plan  ASA Score- 4     Anesthesia Type- general with ASA Monitors  Additional Monitors:   Airway Plan:           Plan Factors-Exercise tolerance (METS): >4 METS  Chart reviewed  Existing labs reviewed  Patient summary reviewed  Patient is not a current smoker  Patient not instructed to abstain from smoking on day of procedure  Patient did not smoke on day of surgery  Induction- intravenous  Postoperative Plan-     Informed Consent- Anesthetic plan and risks discussed with patient

## 2022-02-28 NOTE — PLAN OF CARE
Problem: PAIN - ADULT  Goal: Verbalizes/displays adequate comfort level or baseline comfort level  Description: Interventions:  - Encourage patient to monitor pain and request assistance  - Assess pain using appropriate pain scale  - Administer analgesics based on type and severity of pain and evaluate response  - Implement non-pharmacological measures as appropriate and evaluate response  - Consider cultural and social influences on pain and pain management  - Notify physician/advanced practitioner if interventions unsuccessful or patient reports new pain  Outcome: Progressing     Problem: DISCHARGE PLANNING  Goal: Discharge to home or other facility with appropriate resources  Description: INTERVENTIONS:  - Identify barriers to discharge w/patient and caregiver  - Arrange for needed discharge resources and transportation as appropriate  - Identify discharge learning needs (meds, wound care, etc )  - Arrange for interpretive services to assist at discharge as needed  - Refer to Case Management Department for coordinating discharge planning if the patient needs post-hospital services based on physician/advanced practitioner order or complex needs related to functional status, cognitive ability, or social support system  Outcome: Progressing     Problem: Knowledge Deficit  Goal: Patient/family/caregiver demonstrates understanding of disease process, treatment plan, medications, and discharge instructions  Description: Complete learning assessment and assess knowledge base    Interventions:  - Provide teaching at level of understanding  - Provide teaching via preferred learning methods  Outcome: Progressing     Problem: GENITOURINARY - ADULT  Goal: Maintains or returns to baseline urinary function  Description: INTERVENTIONS:  - Assess urinary function  - Encourage oral fluids to ensure adequate hydration if ordered  - Administer IV fluids as ordered to ensure adequate hydration  - Administer ordered medications as needed  - Offer frequent toileting  - Follow urinary retention protocol if ordered  Outcome: Progressing  Goal: Absence of urinary retention  Description: INTERVENTIONS:  - Assess patients ability to void and empty bladder  - Monitor I/O  - Bladder scan as needed  - Discuss with physician/AP medications to alleviate retention as needed  - Discuss catheterization for long term situations as appropriate  Outcome: Progressing  Goal: Urinary catheter remains patent  Description: INTERVENTIONS:  - Assess patency of urinary catheter  - If patient has a chronic wagner, consider changing catheter if non-functioning  - Follow guidelines for intermittent irrigation of non-functioning urinary catheter  Outcome: Progressing

## 2022-02-28 NOTE — ASSESSMENT & PLAN NOTE
· CT with worsening diffuse bladder wall thickening, could be cystitis versus progression of tumor  · Urine culture on 02/17/2022 showed growth of Enterococcus faecalis  · Urine culture on admission was negative for growth  · Intraoperative culture from the right nephrostomy, is growing Enterococcus faecalis  · Patient is likely colonized     Would opt to continue treatment with Levaquin given plan for invasive urologic procedure

## 2022-02-28 NOTE — PROGRESS NOTES
Progress Note - Urology  [facility] 1945, 76 y o  male MRN: [id number]    Unit/Bed#: E2 -01 Encounter: [id number]      Assessment & Plan  Recurrent bladder cancer, bcg refractory, with HG and CIS UCC of bladder  Current chemotherapy and radiation  Bilateral upper tract obstruction- now s/p nephrostomy tube placement both sides 2/27/22  Excellent UOP volumes from [facility], expect all or most of urine from [facility] not much per urethra  Right ureteral stent (October 2021) plan for cystoscopy removal tomorrow in OR with dr Dorrene Saint  Enterococcus UTI he is on levaquin for several days now  Creatinine back to his baseline mid 3s    NPO at midnight, OR for stent removal tomorrow  Bilateral PCNs stay to gravity drainage  Subjective: feels good today  Not making much urine per urethra, about 100cc over the last day  Excellent urine output from both PCNs  Urine is clear yellow he has no flank pain  Occasinoal bladder and penis pain lasting 15 minutes of spasm or so  Otherwise feels really good  Review of Systems   Constitutional: Negative for activity change, appetite change, chills, fever and unexpected weight change  HENT: Negative  Respiratory: Negative  Negative for shortness of breath  Cardiovascular: Negative  Negative for chest pain  Gastrointestinal: Negative for abdominal pain, diarrhea, nausea and vomiting  Endocrine: Negative  Genitourinary: Positive for penile pain and urgency  Negative for decreased urine volume, difficulty urinating, dysuria, flank pain, frequency, hematuria, scrotal swelling and testicular pain  Musculoskeletal: Negative for back pain and gait problem  Skin: Negative  Allergic/Immunologic: Negative  Neurological: Negative  Hematological: Negative for adenopathy  Does not bruise/bleed easily  Objective:  Vitals: Blood pressure 125/75, pulse 85, temperature (!) 97 3 °F (36 3 °C), temperature source Tympanic, resp   rate 16, weight 104 kg (229 lb 4 5 oz), SpO2 98 %  ,Body mass index is 30 67 kg/m²  Intake/Output Summary (Last 24 hours) at 2/28/2022 1331  Last data filed at 2/28/2022 4386  Gross per 24 hour   Intake 1897 5 ml   Output 2225 ml   Net -327 5 ml     Invasive Devices  Report    Central Venous Catheter Line            Port A Cath 01/17/22 Right Internal jugular 42 days          Peripheral Intravenous Line            Peripheral IV 02/27/22 Right Forearm 1 day          Drain            Nephrostomy Left 2 10 2 Fr  3 days    Nephrostomy Right 1 10 2 Fr  3 days                Physical Exam  Vitals and nursing note reviewed  Constitutional:       Appearance: He is well-developed  Comments: Sitting in bedside chair getting dressed   HENT:      Head: Normocephalic and atraumatic  Cardiovascular:      Rate and Rhythm: Normal rate and regular rhythm  Heart sounds: Normal heart sounds  No murmur heard  Pulmonary:      Effort: Pulmonary effort is normal       Breath sounds: Normal breath sounds  Abdominal:      General: Bowel sounds are normal       Palpations: Abdomen is soft  Tenderness: There is no abdominal tenderness  There is no right CVA tenderness or left CVA tenderness  Comments: Bilateral PCNs to gravity drainage, clear yellow urine output   Genitourinary:     Penis: Normal        Testes: Normal    Musculoskeletal:         General: Normal range of motion  Skin:     General: Skin is warm and dry  Capillary Refill: Capillary refill takes less than 2 seconds  Coloration: Skin is not pale  Neurological:      Mental Status: He is alert and oriented to person, place, and time           Labs:  Recent Labs     02/26/22  0817 02/27/22  0510 02/28/22  0423   WBC 6 28 6 67 6 23     Recent Labs     02/26/22  0817 02/27/22  0510 02/28/22  0423   HGB 8 2* 8 2* 8 0*       Recent Labs     02/26/22  0817 02/27/22  0510 02/28/22  0423   CREATININE 3 43* 3 63* 3 39*       History:    Past Medical History:   Diagnosis Date    Anemia     Last assessed: 9/28/17    Anxiety     Arteriosclerotic cardiovascular disease     Last assessed: 9/28/17    Arthritis     Bladder cancer ([facility])     bladder- had BCG treatments    Chronic kidney disease     Stage IV    CKD (chronic kidney disease) stage 4, GFR 15-29 ml/min ([facility])     Colon polyp     Coronary artery disease     7 stents    Depression     Diabetes mellitus ([facility])     IDDM    GERD (gastroesophageal reflux disease)     Glaucoma     Hematuria     History of fusion of cervical spine     Hyperlipidemia     Hypertension     Insomnia     Last assessed: 11/14/12    Loss of hearing     has hearing aids but usually does not wear them    Other seasonal allergic rhinitis     Last assessed: 2/10/16    PAD (peripheral artery disease) ([facility])     Shortness of breath     on exertion    Spinal stenosis of lumbar region     Transient cerebral ischemia     No Residual    Uses walker     w/c for longer distances     Past Surgical History:   Procedure Laterality Date    CARDIAC SURGERY      Cath stent placement  Last assessed: 3/9/17  Interventional Catheterization    CHOLECYSTECTOMY      COLONOSCOPY      CYSTOSCOPY      Diagnostic w/biopsy  Ammy Fines  Last assessed: 12/1/14    CYSTOSCOPY W/ URETERAL STENT PLACEMENT Bilateral 10/18/2021    Procedure: bilateral retrogrades, cytology collection;  Surgeon: Ella Grullon MD;  [facility];  Service: Urology    CYSTOURETHROSCOPY      w/cautery  Ammy Fines    FL RETROGRADE PYELOGRAM  10/18/2021    FL RETROGRADE PYELOGRAM  10/24/2021    GASTRIC BYPASS      For morbid obesity w/Shaji-en-Y   Resolved: 11/17/09    INCISION AND DRAINAGE OF WOUND Right 2/26/2017    Procedure: INCISION AND DRAINAGE (I&D) EXTREMITY WITH APPLICATION OF GRAFT JACKET;  Surgeon: Colman Olszewski, DPM;  [facility];  Service:     INCISION AND DRAINAGE OF WOUND Right 4/25/2017    Procedure: INCISION AND DRAINAGE (I&D) EXTREMITY, APPLICATION OF GRAFT;  Surgeon: Alessia Pires DPM;  [facility];  Service:     IR BIOPSY OTHER  7/2/2020    IR LOWER EXTREMITY ANGIOGRAM  2/8/2021    IR LOWER EXTREMITY ANGIOGRAM  2/11/2021    IR NEPHROSTOMY TUBE PLACEMENT  2/25/2022    IR PORT PLACEMENT  1/17/2022    IR TUNNELED CENTRAL LINE PLACEMENT  12/24/2020    JOINT REPLACEMENT      christofer knees replaced    SD AMPUTATION METATARSAL+TOE,SINGLE Left 12/21/2020    Procedure: RAY RESECTION FOOT;  Surgeon: Doyle Dozier DPM;  [facility];  Service: Podiatry    SD AMPUTATION METATARSAL+TOE,SINGLE Left 12/31/2020    Procedure: 5TH MET RESECTION;  Surgeon: Doyle Dozier DPM;  [facility];  Service: Podiatry    SD CYSTOURETHROSCOPY W/IRRIG & EVAC CLOTS N/A 2/10/2021    Procedure: CYSTOSCOPY EVACUATION OF CLOTS, fulguration;  Surgeon: Lexie Morrison MD;  [facility];  Service: Urology    SD CYSTOURETHROSCOPY W/IRRIG & EVAC CLOTS N/A 10/24/2021    Procedure: CYSTOSCOPY EVACUATION OF CLOT, fulguration of bleeding vessels, right ureter stent placement, retrograde pyelogram;  Surgeon: Mila Bentley MD;  [facility];  Service: Urology    SD CYSTOURETHROSCOPY,BIOPSY N/A 8/16/2016    Procedure: CYSTOSCOPY WITH BIOPSIES;  Surgeon: Heydi Alarcon MD;  [facility];  Service: Urology    SD CYSTOURETHROSCOPY,FULGUR <0 5 CM LESN N/A 11/19/2020    Procedure: CYSTO W/BIOPSIES, transurethral prostate bx;  Surgeon: Ferrel Boxer, MD;  [facility];  Service: Urology    SD CYSTOURETHROSCOPY,FULGUR >5 CM LESN Bilateral 10/18/2021    Procedure: TRANSURETHRAL RESECTION OF BLADDER TUMOR (TURBT);   Surgeon: Kera Martinez MD;  [facility];  Service: Urology    SD Morales Araiza 3RD+ ORD [address] PEL/TR [facility] Left 2/8/2021    Procedure: LEG angiogram, CO2 w/limited contrast with balloon angioplasty postertior tibial artery;  Surgeon: Cecilio Romero MD;  [facility];  Service: Vascular    ROTATOR CUFF REPAIR      SMALL INTESTINE SURGERY      Surgery Shaji-en-Y    SPINAL FUSION      lumbar and cervical fusions    VAC DRESSING APPLICATION Right 4680    Procedure: APPLICATION VAC DRESSING;  Surgeon: Osman Byrne DPM;  [facility];  Service:    [address] [place] [address] Left 2021    Procedure: FOOT DEBRIDE, [address] OUT w/graft application;  Surgeon: Osman Byrne DPM;  [facility];  Service: Podiatry     Family History   Problem Relation Age of Onset    Diabetes Mother     Heart disease Mother     Other Mother         High blood pressure    Heart disease Father     Diabetes Sister     Other Sister         High blood pressure    Kidney disease Sister     Heart disease Brother     Other Brother         High blood pressure     Social History     Socioeconomic History    Marital status: /Civil Union     Spouse name: None    Number of children: None    Years of education: None    Highest education level: None   Occupational History    None   Tobacco Use    Smoking status: Former Smoker     Packs/day: 3 00     Years: 27 00     Pack years: 81 00     Types: Cigarettes     Quit date:      Years since quittin 1    Smokeless tobacco: Never Used   Vaping Use    Vaping Use: Never used   Substance and Sexual Activity    Alcohol use: Not Currently     Comment: beer / liquor    Drug use: Not Currently     Types: Marijuana     Comment: quit 2019 had medical marijuana    Sexual activity: Not Currently   Other Topics Concern    None   Social History Narrative    Consumes 1 cup of coffee and 1 soda per day     Social Determinants of Health     Financial Resource Strain: Not on file   Food Insecurity: Not on file   Transportation Needs: Not on file   Physical Activity: Not on file   Stress: Not on file   Social Connections: Not on file   Intimate Partner Violence: Not on file   Housing Stability: Not on file         Regina Mata  Date: 2022 Time: 1:31 PM

## 2022-03-01 ENCOUNTER — ANESTHESIA (INPATIENT)
Dept: PERIOP | Facility: HOSPITAL | Age: 77
DRG: 690 | End: 2022-03-01
Payer: MEDICARE

## 2022-03-01 ENCOUNTER — APPOINTMENT (INPATIENT)
Dept: RADIOLOGY | Facility: HOSPITAL | Age: 77
DRG: 690 | End: 2022-03-01
Payer: MEDICARE

## 2022-03-01 PROBLEM — E87.5 HYPERKALEMIA: Status: ACTIVE | Noted: 2022-03-01
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
## 2022-03-01 LAB
ANION GAP SERPL CALCULATED.3IONS-SCNC: 11 MMOL/L (ref 4–13)
ANION GAP SERPL CALCULATED.3IONS-SCNC: 9 MMOL/L (ref 4–13)
ATRIAL RATE: 64 BPM
BUN SERPL-MCNC: 43 MG/DL (ref 5–25)
BUN SERPL-MCNC: 45 MG/DL (ref 5–25)
CALCIUM SERPL-MCNC: 8.3 MG/DL (ref 8.3–10.1)
CALCIUM SERPL-MCNC: 8.3 MG/DL (ref 8.3–10.1)
CHLORIDE SERPL-SCNC: 105 MMOL/L (ref 100–108)
CHLORIDE SERPL-SCNC: 107 MMOL/L (ref 100–108)
CO2 SERPL-SCNC: 18 MMOL/L (ref 21–32)
CO2 SERPL-SCNC: 22 MMOL/L (ref 21–32)
CREAT SERPL-MCNC: 3.01 MG/DL (ref 0.6–1.3)
CREAT SERPL-MCNC: 3.05 MG/DL (ref 0.6–1.3)
ERYTHROCYTE [DISTWIDTH] IN BLOOD BY AUTOMATED COUNT: 15.7 % (ref 11.6–15.1)
GFR SERPL CREATININE-BSD FRML MDRD: 18 ML/MIN/1.73SQ M
GFR SERPL CREATININE-BSD FRML MDRD: 18 ML/MIN/1.73SQ M
GLUCOSE SERPL-MCNC: 102 MG/DL (ref 65–140)
GLUCOSE SERPL-MCNC: 112 MG/DL (ref 65–140)
GLUCOSE SERPL-MCNC: 116 MG/DL (ref 65–140)
GLUCOSE SERPL-MCNC: 133 MG/DL (ref 65–140)
GLUCOSE SERPL-MCNC: 136 MG/DL (ref 65–140)
GLUCOSE SERPL-MCNC: 283 MG/DL (ref 65–140)
GLUCOSE SERPL-MCNC: 83 MG/DL (ref 65–140)
GLUCOSE SERPL-MCNC: 84 MG/DL (ref 65–140)
HCT VFR BLD AUTO: 26.4 % (ref 36.5–49.3)
HGB BLD-MCNC: 8.9 G/DL (ref 12–17)
MCH RBC QN AUTO: 30.4 PG (ref 26.8–34.3)
MCHC RBC AUTO-ENTMCNC: 33.7 G/DL (ref 31.4–37.4)
MCV RBC AUTO: 90 FL (ref 82–98)
P AXIS: 40 DEGREES
PLATELET # BLD AUTO: 181 THOUSANDS/UL (ref 149–390)
PMV BLD AUTO: 10.4 FL (ref 8.9–12.7)
POTASSIUM SERPL-SCNC: 4.4 MMOL/L (ref 3.5–5.3)
POTASSIUM SERPL-SCNC: 6.2 MMOL/L (ref 3.5–5.3)
PR INTERVAL: 204 MS
QRS AXIS: -17 DEGREES
QRSD INTERVAL: 106 MS
QT INTERVAL: 410 MS
QTC INTERVAL: 422 MS
RBC # BLD AUTO: 2.93 MILLION/UL (ref 3.88–5.62)
SODIUM SERPL-SCNC: 134 MMOL/L (ref 136–145)
SODIUM SERPL-SCNC: 138 MMOL/L (ref 136–145)
T WAVE AXIS: 45 DEGREES
VENTRICULAR RATE: 64 BPM
WBC # BLD AUTO: 6.29 THOUSAND/UL (ref 4.31–10.16)

## 2022-03-01 PROCEDURE — 73070 X-RAY EXAM OF ELBOW: CPT

## 2022-03-01 PROCEDURE — 97110 THERAPEUTIC EXERCISES: CPT

## 2022-03-01 PROCEDURE — 80048 BASIC METABOLIC PNL TOTAL CA: CPT | Performed by: INTERNAL MEDICINE

## 2022-03-01 PROCEDURE — 82948 REAGENT STRIP/BLOOD GLUCOSE: CPT

## 2022-03-01 PROCEDURE — 85027 COMPLETE CBC AUTOMATED: CPT | Performed by: INTERNAL MEDICINE

## 2022-03-01 PROCEDURE — 93010 ELECTROCARDIOGRAM REPORT: CPT | Performed by: INTERNAL MEDICINE

## 2022-03-01 PROCEDURE — 99232 SBSQ HOSP IP/OBS MODERATE 35: CPT | Performed by: PHYSICIAN ASSISTANT

## 2022-03-01 PROCEDURE — 73030 X-RAY EXAM OF SHOULDER: CPT

## 2022-03-01 PROCEDURE — 93005 ELECTROCARDIOGRAM TRACING: CPT

## 2022-03-01 PROCEDURE — 97530 THERAPEUTIC ACTIVITIES: CPT

## 2022-03-01 PROCEDURE — C1769 GUIDE WIRE: HCPCS | Performed by: UROLOGY

## 2022-03-01 PROCEDURE — 52310 CYSTOSCOPY AND TREATMENT: CPT | Performed by: UROLOGY

## 2022-03-01 PROCEDURE — 0TP98DZ REMOVAL OF INTRALUMINAL DEVICE FROM URETER, VIA NATURAL OR ARTIFICIAL OPENING ENDOSCOPIC: ICD-10-PCS | Performed by: UROLOGY

## 2022-03-01 PROCEDURE — 99232 SBSQ HOSP IP/OBS MODERATE 35: CPT | Performed by: INTERNAL MEDICINE

## 2022-03-01 RX ORDER — DEXTROSE MONOHYDRATE 100 MG/ML
125 INJECTION, SOLUTION INTRAVENOUS ONCE
Status: COMPLETED | OUTPATIENT
Start: 2022-03-01 | End: 2022-03-01

## 2022-03-01 RX ORDER — PROPOFOL 10 MG/ML
INJECTION, EMULSION INTRAVENOUS AS NEEDED
Status: DISCONTINUED | OUTPATIENT
Start: 2022-03-01 | End: 2022-03-01

## 2022-03-01 RX ORDER — ONDANSETRON 2 MG/ML
4 INJECTION INTRAMUSCULAR; INTRAVENOUS ONCE AS NEEDED
Status: DISCONTINUED | OUTPATIENT
Start: 2022-03-01 | End: 2022-03-01 | Stop reason: HOSPADM

## 2022-03-01 RX ORDER — HYDRALAZINE HYDROCHLORIDE 20 MG/ML
10 INJECTION INTRAMUSCULAR; INTRAVENOUS
Status: DISCONTINUED | OUTPATIENT
Start: 2022-03-01 | End: 2022-03-01 | Stop reason: HOSPADM

## 2022-03-01 RX ORDER — FENTANYL CITRATE/PF 50 MCG/ML
25 SYRINGE (ML) INJECTION
Status: DISCONTINUED | OUTPATIENT
Start: 2022-03-01 | End: 2022-03-01 | Stop reason: HOSPADM

## 2022-03-01 RX ORDER — MIDAZOLAM HYDROCHLORIDE 2 MG/2ML
INJECTION, SOLUTION INTRAMUSCULAR; INTRAVENOUS AS NEEDED
Status: DISCONTINUED | OUTPATIENT
Start: 2022-03-01 | End: 2022-03-01

## 2022-03-01 RX ORDER — PROMETHAZINE HYDROCHLORIDE 25 MG/ML
12.5 INJECTION, SOLUTION INTRAMUSCULAR; INTRAVENOUS ONCE AS NEEDED
Status: DISCONTINUED | OUTPATIENT
Start: 2022-03-01 | End: 2022-03-01 | Stop reason: HOSPADM

## 2022-03-01 RX ORDER — PROPOFOL 10 MG/ML
INJECTION, EMULSION INTRAVENOUS CONTINUOUS PRN
Status: DISCONTINUED | OUTPATIENT
Start: 2022-03-01 | End: 2022-03-01

## 2022-03-01 RX ORDER — FENTANYL CITRATE 50 UG/ML
INJECTION, SOLUTION INTRAMUSCULAR; INTRAVENOUS AS NEEDED
Status: DISCONTINUED | OUTPATIENT
Start: 2022-03-01 | End: 2022-03-01

## 2022-03-01 RX ORDER — LIDOCAINE HYDROCHLORIDE 20 MG/ML
INJECTION, SOLUTION EPIDURAL; INFILTRATION; INTRACAUDAL; PERINEURAL AS NEEDED
Status: DISCONTINUED | OUTPATIENT
Start: 2022-03-01 | End: 2022-03-01

## 2022-03-01 RX ORDER — ALBUTEROL SULFATE 2.5 MG/3ML
2.5 SOLUTION RESPIRATORY (INHALATION) ONCE AS NEEDED
Status: DISCONTINUED | OUTPATIENT
Start: 2022-03-01 | End: 2022-03-01 | Stop reason: HOSPADM

## 2022-03-01 RX ORDER — CALCIUM GLUCONATE 20 MG/ML
1 INJECTION, SOLUTION INTRAVENOUS ONCE
Status: COMPLETED | OUTPATIENT
Start: 2022-03-01 | End: 2022-03-01

## 2022-03-01 RX ORDER — SODIUM CHLORIDE 9 MG/ML
INJECTION, SOLUTION INTRAVENOUS AS NEEDED
Status: DISCONTINUED | OUTPATIENT
Start: 2022-03-01 | End: 2022-03-01 | Stop reason: HOSPADM

## 2022-03-01 RX ORDER — LIDOCAINE HYDROCHLORIDE 20 MG/ML
JELLY TOPICAL AS NEEDED
Status: DISCONTINUED | OUTPATIENT
Start: 2022-03-01 | End: 2022-03-01 | Stop reason: HOSPADM

## 2022-03-01 RX ORDER — HYDROMORPHONE HCL/PF 1 MG/ML
0.5 SYRINGE (ML) INJECTION
Status: DISCONTINUED | OUTPATIENT
Start: 2022-03-01 | End: 2022-03-01 | Stop reason: HOSPADM

## 2022-03-01 RX ADMIN — HYDROMORPHONE HYDROCHLORIDE 1 MG: 1 INJECTION, SOLUTION INTRAMUSCULAR; INTRAVENOUS; SUBCUTANEOUS at 00:07

## 2022-03-01 RX ADMIN — OXYBUTYNIN CHLORIDE 5 MG: 5 TABLET ORAL at 20:12

## 2022-03-01 RX ADMIN — LEVOFLOXACIN 250 MG: 250 TABLET, FILM COATED ORAL at 12:01

## 2022-03-01 RX ADMIN — GABAPENTIN 300 MG: 300 CAPSULE ORAL at 21:39

## 2022-03-01 RX ADMIN — SODIUM CHLORIDE: 9 INJECTION, SOLUTION INTRAMUSCULAR; INTRAVENOUS; SUBCUTANEOUS at 07:58

## 2022-03-01 RX ADMIN — MELATONIN 6 MG: at 21:39

## 2022-03-01 RX ADMIN — SODIUM CHLORIDE 75 ML/HR: 0.9 INJECTION, SOLUTION INTRAVENOUS at 10:43

## 2022-03-01 RX ADMIN — HYDROMORPHONE HYDROCHLORIDE 1 MG: 1 INJECTION, SOLUTION INTRAMUSCULAR; INTRAVENOUS; SUBCUTANEOUS at 21:45

## 2022-03-01 RX ADMIN — FENTANYL CITRATE 25 MCG: 50 INJECTION, SOLUTION INTRAMUSCULAR; INTRAVENOUS at 14:42

## 2022-03-01 RX ADMIN — EZETIMIBE 10 MG: 10 TABLET ORAL at 05:56

## 2022-03-01 RX ADMIN — OXYBUTYNIN CHLORIDE 5 MG: 5 TABLET ORAL at 08:43

## 2022-03-01 RX ADMIN — CALCIUM GLUCONATE 1 G: 20 INJECTION, SOLUTION INTRAVENOUS at 07:58

## 2022-03-01 RX ADMIN — ISOSORBIDE MONONITRATE 30 MG: 30 TABLET, EXTENDED RELEASE ORAL at 05:56

## 2022-03-01 RX ADMIN — FENTANYL CITRATE 100 MCG: 50 INJECTION INTRAMUSCULAR; INTRAVENOUS at 13:53

## 2022-03-01 RX ADMIN — DOCUSATE SODIUM 100 MG: 100 CAPSULE ORAL at 21:54

## 2022-03-01 RX ADMIN — HYDROCODONE BITARTRATE AND ACETAMINOPHEN 1 TABLET: 5; 325 TABLET ORAL at 20:12

## 2022-03-01 RX ADMIN — HYDROMORPHONE HYDROCHLORIDE 1 MG: 1 INJECTION, SOLUTION INTRAMUSCULAR; INTRAVENOUS; SUBCUTANEOUS at 05:56

## 2022-03-01 RX ADMIN — DOCUSATE SODIUM 50MG AND SENNOSIDES 8.6MG 1 TABLET: 8.6; 5 TABLET, FILM COATED ORAL at 17:51

## 2022-03-01 RX ADMIN — FENTANYL CITRATE 25 MCG: 50 INJECTION, SOLUTION INTRAMUSCULAR; INTRAVENOUS at 14:52

## 2022-03-01 RX ADMIN — SODIUM BICARBONATE 50 MEQ: 84 INJECTION INTRAVENOUS at 08:19

## 2022-03-01 RX ADMIN — MIDAZOLAM 2 MG: 1 INJECTION INTRAMUSCULAR; INTRAVENOUS at 13:45

## 2022-03-01 RX ADMIN — METOPROLOL SUCCINATE 100 MG: 50 TABLET, EXTENDED RELEASE ORAL at 08:43

## 2022-03-01 RX ADMIN — DEXTROSE MONOHYDRATE 125 ML: 100 INJECTION, SOLUTION INTRAVENOUS at 07:44

## 2022-03-01 RX ADMIN — ALPRAZOLAM 0.25 MG: 0.25 TABLET ORAL at 00:05

## 2022-03-01 RX ADMIN — HEPARIN SODIUM 5000 UNITS: 5000 INJECTION INTRAVENOUS; SUBCUTANEOUS at 05:56

## 2022-03-01 RX ADMIN — PROPOFOL 150 MCG/KG/MIN: 10 INJECTION, EMULSION INTRAVENOUS at 13:53

## 2022-03-01 RX ADMIN — LIDOCAINE HYDROCHLORIDE 100 MG: 20 INJECTION, SOLUTION EPIDURAL; INFILTRATION; INTRACAUDAL; PERINEURAL at 13:53

## 2022-03-01 RX ADMIN — OXYBUTYNIN CHLORIDE 5 MG: 5 TABLET ORAL at 16:22

## 2022-03-01 RX ADMIN — INSULIN GLARGINE 10 UNITS: 100 INJECTION, SOLUTION SUBCUTANEOUS at 21:39

## 2022-03-01 RX ADMIN — HYDROMORPHONE HYDROCHLORIDE 1 MG: 1 INJECTION, SOLUTION INTRAMUSCULAR; INTRAVENOUS; SUBCUTANEOUS at 19:00

## 2022-03-01 RX ADMIN — SERTRALINE HYDROCHLORIDE 50 MG: 50 TABLET ORAL at 08:43

## 2022-03-01 RX ADMIN — PANTOPRAZOLE SODIUM 40 MG: 40 TABLET, DELAYED RELEASE ORAL at 05:56

## 2022-03-01 RX ADMIN — BIMATOPROST 1 DROP: 0.1 SOLUTION/ DROPS OPHTHALMIC at 21:40

## 2022-03-01 RX ADMIN — HEPARIN SODIUM 5000 UNITS: 5000 INJECTION INTRAVENOUS; SUBCUTANEOUS at 21:40

## 2022-03-01 RX ADMIN — PROPOFOL 30 MG: 10 INJECTION, EMULSION INTRAVENOUS at 13:53

## 2022-03-01 RX ADMIN — TAMSULOSIN HYDROCHLORIDE 0.4 MG: 0.4 CAPSULE ORAL at 21:40

## 2022-03-01 RX ADMIN — DULOXETINE HYDROCHLORIDE 20 MG: 20 CAPSULE, DELAYED RELEASE PELLETS ORAL at 08:44

## 2022-03-01 NOTE — OCCUPATIONAL THERAPY NOTE
Patient attempted for therapy this PM however session cancelled as patient was leaving for OR stent removal   Will resume therapy as appropriate and medically cleared        Ronny Montoya  3/1/2022

## 2022-03-01 NOTE — PLAN OF CARE
Problem: PHYSICAL THERAPY ADULT  Goal: Performs mobility at highest level of function for planned discharge setting  See evaluation for individualized goals  Description: Treatment/Interventions: Functional transfer training,LE strengthening/ROM,Elevations,Therapeutic exercise,Patient/family training,Equipment eval/education,Bed mobility,Gait training,Compensatory technique education,Continued evaluation,Spoke to nursing,OT          See flowsheet documentation for full assessment, interventions and recommendations  Outcome: Progressing  Note: Prognosis: Good  Problem List: Decreased strength,Decreased mobility,Pain,Decreased range of motion,Decreased endurance  Assessment: Pt  reported no increased discomfort post session  Pt  noted with RLE weakness during LE Sahil  Pt  reported he did not hit his head or LEs anywhere during the fall  pt  able to perform STS transfer at the bedside chair with S  Will continue to follow durign the stay to [facility] functional mobility  Barriers to Discharge: None        PT Discharge Recommendation: Home with home health rehabilitation          See flowsheet documentation for full assessment

## 2022-03-01 NOTE — ASSESSMENT & PLAN NOTE
Lab Results   Component Value Date    HGBA1C 7 7 (H) 09/03/2021       Recent Labs     03/01/22  0611 03/01/22  0803 03/01/22  1105 03/01/22  1429   POCGLU 133 116 84 102     A m  Hypoglycemia  Continue Lantus 10 units q h s   Plus sliding scale insulin

## 2022-03-01 NOTE — PHYSICAL THERAPY NOTE
Physical Therapy Treatment Note     03/01/22 0848   PT Last Visit   PT Visit Date 03/01/22   Note Type   Note Type Treatment   Pain Assessment   Pain Assessment Tool 0-10   Pain Location/Orientation Location: Groin;Orientation: Left; Location: Shoulder   Restrictions/Precautions   Weight Bearing Precautions Per Order No   Other Precautions Fall Risk;Pain;Multiple lines  (NPO)   General   Chart Reviewed Yes   Cognition   Overall Cognitive Status WFL   Subjective   Subjective Per nursing patient had a fall in the bathroom however okayed to treat patient reporting he did not hit his head but L shoulder and elbow may have hit the toilet paper dispenser  Doctor checked and reported patient possibly okay but ordered X-rays for shoulder  Pt  agreeable to PT  Transfers   Sit to Stand 5  Supervision   Additional items Increased time required;Armrests;Assist x 1   Stand to Sit 5  Supervision   Additional items Assist x 1; Increased time required;Armrests   Balance   Static Sitting Fair +   Dynamic Sitting Fair   Static Standing Fair   Dynamic Standing Fair -   Activity Tolerance   Activity Tolerance Patient tolerated treatment well   Nurse Made Aware Yes   Exercises   TKR Sitting;AROM; Bilateral  (HR/TR, LAQ, Marches, hip abd/add)   Assessment   Prognosis Good   Problem List Decreased strength;Decreased mobility;Pain;Decreased range of motion;Decreased endurance   Assessment Pt  reported no increased discomfort post session  Pt  noted with RLE weakness during LE Sahil  Pt  reported he did not hit his head or LEs anywhere during the fall  pt  able to perform STS transfer at the bedside chair with S  Will continue to follow durign the stay to maximixe functional mobility  Barriers to Discharge None   Goals   Patient Goals None reported   STG Expiration Date 03/09/22   PT Treatment Day 1   Plan   Treatment/Interventions Functional transfer training;LE strengthening/ROM; Therapeutic exercise;Patient/family training;Spoke to nursing;Spoke to advanced practitioner   Progress Progressing toward goals   PT Frequency 2-3x/wk   Recommendation   PT Discharge Recommendation Home with home health rehabilitation   AM-PAC Basic Mobility Inpatient   Turning in Bed Without Bedrails 4   Lying on Back to Sitting on Edge of Flat Bed 3   Moving Bed to Chair 3   Standing Up From Chair 4   Walk in Room 3   Climb 3-5 Stairs 3   Basic Mobility Inpatient Raw Score 20   Basic Mobility Standardized Score 43 99   Highest Level Of Mobility   JH-HLM Goal 6: Walk 10 steps or more   JH-HLM Highest Level of Mobility 7: Walk 25 feet or more   JH-HLM Goal Achieved No   End of Consult   Patient Position at End of Consult Bedside chair;Bed/Chair alarm activated; All needs within reach       Ana Duke PTA    An AM-PAC basic mobility standardized score less than 42 9 suggest the patient may benefit from discharge to post-acute rehab services

## 2022-03-01 NOTE — PROGRESS NOTES
[facility]  Progress Note - Pawan Mallory 1945, 76 y o  male MRN: [id number]  Unit/Bed#: E2 -01 Encounter: [id number]  Primary Care Provider: Nate Wheeler MD   Date and time admitted to hospital: 2/23/2022  2:50 PM    Hyperkalemia  Assessment & Plan  Potassium is 6 2 this morning  Treated with insulin/D5, calcium gluconate, sodium bicarbonate with improvement to 4 4  Follow-up BMP in the morning  Consider Nephrology consultation    [address]  Patient fell going to the bathroom 3/1/2022  He was not wearing his socks and slipped on the floor and hit his shoulder and elbow off of the toilet paper dispenser  Reports some shoulder, elbow pain  No deformity on exam  Will check a left shoulder and elbow x-ray    Acute kidney injury superimposed on CKD ([facility] [address] )  Assessment & Plan  · GARY on CKD 4 secondary to obstructive uropathy  · Status post percutaneous nephrostomy tube placement on 02/25 and Shirley catheter placement  · Continue to hold nephrotoxins  · Continue IV fluids  · Urology with plan for cystoscopy with remove right ureteral stent 2/29/2022    Type 2 diabetes mellitus, with long-term current use of insulin [facility])  Assessment & Plan  Lab Results   Component Value Date    HGBA1C 7 7 (H) 09/03/2021       Recent Labs     03/01/22  0611 03/01/22  0803 03/01/22  1105 03/01/22  1429   POCGLU 133 116 84 102     A m  Hypoglycemia  Continue Lantus 10 units q h s  Plus sliding scale insulin    Hyperlipidemia  Assessment & Plan  · Continue Zetia daily    Hypertension with renal disease  Assessment & Plan  · Continue metoprolol 100 mg daily    Bladder carcinoma (HCC)  Assessment & Plan  · Stage II bladder carcinoma followed by Urology    · Status post recent TURBT 10/18/21  · Previously treated with BCG instillation  · Outpatient follow-up with urology for further treatment    Coronary artery disease of native artery of native heart with stable angina pectoris [facility])  Assessment & Plan  · Stable no chest pain  · Continue aspirin and Toprol  mg daily    * Abnormal CT scan, bladder  Assessment & Plan  · CT with worsening diffuse bladder wall thickening, could be cystitis versus progression of tumor  · Urine culture on 2022 showed growth of Enterococcus faecalis  · Urine culture on admission was negative for growth  · Intraoperative culture from the right nephrostomy, is growing Enterococcus faecalis  · Patient is likely colonized     Would opt to continue treatment with Levaquin given plan for invasive urologic procedure  VTE Pharmacologic Prophylaxis: heparin     Patient Centered Rounds:  Patient care rounds were performed with nursing    Discussions with Specialists or Other Care Team Provider: Urology     Education and Discussions with Family / Patient: Called patient's spouse Robert Berrios, no answer      Time Spent for Care: 30  More than 50% of total time spent on counseling and coordination of care as described above  Current Length of Stay: 6 day(s)    Current Patient Status: Inpatient   Certification Statement: The patient will continue to require additional inpatient hospital stay due to ongoing neurologic evaluation    Discharge Plan:  Pending procedure, stability of labs in the morning    Code Status: Level 1 - Full Code      Subjective:   Patient seen evaluated at bedside  He feels well  He slipped on the floor when going to the bathroom as he was wearing his socks  Objective:     Vitals:   Temp (24hrs), Av 4 °F (36 3 °C), Min:97 °F (36 1 °C), Max:97 9 °F (36 6 °C)    Temp:  [97 °F (36 1 °C)-97 9 °F (36 6 °C)] 97 2 °F (36 2 °C)  HR:  [62-78] 78  Resp:  [10-18] 16  BP: (126-207)/(54-93) 152/83  SpO2:  [98 %-100 %] 98 %  Body mass index is 30 67 kg/m²  Input and Output Summary (last 24 hours):        Intake/Output Summary (Last 24 hours) at 3/1/2022 1558  Last data filed at 3/1/2022 1546  Gross per 24 hour   Intake 420 ml   Output 2830 ml Net -2410 ml       Physical Exam:     Physical Exam  Vitals reviewed  Constitutional:       General: He is not in acute distress  Appearance: He is well-developed  He is not ill-appearing, toxic-appearing or diaphoretic  HENT:      Head: Normocephalic and atraumatic  Mouth/Throat:      Mouth: Mucous membranes are moist       Pharynx: No oropharyngeal exudate  Eyes:      General: No scleral icterus  Extraocular Movements: Extraocular movements intact  Conjunctiva/sclera: Conjunctivae normal    Cardiovascular:      Rate and Rhythm: Normal rate and regular rhythm  Heart sounds: Normal heart sounds  Pulmonary:      Effort: Pulmonary effort is normal  No respiratory distress  Breath sounds: Normal breath sounds  No wheezing or rales  Abdominal:      General: There is no distension  Palpations: Abdomen is soft  Tenderness: There is no abdominal tenderness  There is no guarding or rebound  Musculoskeletal:         General: No swelling, tenderness or deformity  Cervical back: Normal range of motion  Skin:     General: Skin is warm and dry  Neurological:      General: No focal deficit present  Mental Status: He is alert  Mental status is at baseline  Psychiatric:         Mood and Affect: Mood normal          Behavior: Behavior normal          Thought Content: Thought content normal          Judgment: Judgment normal          Additional Data:     Labs: I have reviewed pertinent results     Results from last 7 days   Lab Units 03/01/22  0426 02/28/22  0423 02/28/22  0423   WBC Thousand/uL 6 29   < > 6 23   HEMOGLOBIN g/dL 8 9*   < > 8 0*   HEMATOCRIT % 26 4*   < > 23 7*   PLATELETS Thousands/uL 181   < > 159   NEUTROS PCT %  --   --  59   LYMPHS PCT %  --   --  20   MONOS PCT %  --   --  17*   EOS PCT %  --   --  3    < > = values in this interval not displayed       Results from last 7 days   Lab Units 03/01/22  1024 02/26/22  0817 02/25/22  0429   SODIUM mmol/L 138   < > 135*   POTASSIUM mmol/L 4 4   < > 5 4*   CHLORIDE mmol/L 107   < > 105   CO2 mmol/L 22   < > 20*   BUN mg/dL 45*   < > 59*   CREATININE mg/dL 3 01*   < > 3 72*   ANION GAP mmol/L 9   < > 10   CALCIUM mg/dL 8 3   < > 8 3   ALBUMIN g/dL  --   --  2 8*   TOTAL BILIRUBIN mg/dL  --   --  0 22   ALK PHOS U/L  --   --  211*   ALT U/L  --   --  87*   AST U/L  --   --  62*   GLUCOSE RANDOM mg/dL 83   < > 140    < > = values in this interval not displayed  Results from last 7 days   Lab Units 02/23/22  1521   INR  1 06     Results from last 7 days   Lab Units 03/01/22  1429 03/01/22  1105 03/01/22  0803 03/01/22  0611 02/28/22  2119 02/28/22  1551 02/28/22  1122 02/28/22  6462 02/28/22  0608 02/27/22  2108 02/27/22  1534 02/27/22  1116   POC GLUCOSE mg/dl 102 84 116 133 181* 118 91 104 64* 129 165* 150*         Results from last 7 days   Lab Units 02/23/22  1818 02/23/22  1521   LACTIC ACID mmol/L  --  1 1   PROCALCITONIN ng/ml 0 53* 0 53*         Imaging: I have reviewed pertinent imaging       Recent Cultures (last 7 days):     Results from last 7 days   Lab Units 02/25/22  1156 02/25/22  1134 02/23/22  1543 02/23/22  1521   BLOOD CULTURE   --   --   --  No Growth After 5 Days  No Growth After 5 Days     URINE CULTURE  10,000-19,000 cfu/ml  60,000-69,000 cfu/ml Enterococcus faecalis* No Growth <1000 cfu/mL  --        Last 24 Hours Medication List:   Current Facility-Administered Medications   Medication Dose Route Frequency Provider Last Rate    acetaminophen  650 mg Oral Q6H PRN India Tee MD      ALPRAZolam  0 25 mg Oral BID PRN India Tee MD      aspirin  81 mg Oral Daily nIdia Tee MD      bimatoprost  1 drop Both Eyes HS India Tee MD      calcitriol  0 25 mcg Oral Daily India Tee MD      docusate sodium  100 mg Oral BID PRN India Tee MD      DULoxetine  20 mg Oral Daily India Tee MD      ezetimibe  10 mg Oral Early Morning India Tee MD      gabapentin  300 mg Oral HS Alex Pires MD      heparin (porcine)  5,000 Units Subcutaneous [facility] Alex Pires MD      HYDROcodone-acetaminophen  1 tablet Oral Q6H PRN Alex Pires MD      HYDROmorphone  1 mg Intravenous Q3H PRN Alex Pires MD      insulin glargine  10 Units Subcutaneous HS Alex Pires MD      insulin lispro  1-5 Units Subcutaneous TID [facility] Alex Pires MD      isosorbide mononitrate  30 mg Oral Early Morning Alex Pires MD      levofloxacin  250 mg Oral Q24H Alex Pires MD      melatonin  6 mg Oral HS Alex Pires MD      metoprolol succinate  100 mg Oral Daily Alex Pires MD      ondansetron  4 mg Intravenous Q6H PRN Alex Pires MD      oxybutynin  5 mg Oral TID Alex Pires MD      pantoprazole  40 mg Oral Early Morning Alex Pires MD      senna-docusate sodium  1 tablet Oral BID Alex Pires MD      sertraline  50 mg Oral Daily Alex Pires MD      sodium chloride (PF)  3 mL Intravenous Q1H PRN Alex Pires MD      sodium chloride  75 mL/hr Intravenous Continuous Alex Pires MD 75 mL/hr (03/01/22 1414)    tamsulosin  0 4 mg Oral HS Alex Pires MD          Today, Patient Was Seen By: Tonie Hodges DO    ** Please Note: Dictation voice to text software may have been used in the creation of this document   **

## 2022-03-01 NOTE — PLAN OF CARE
Problem: PAIN - ADULT  Goal: Verbalizes/displays adequate comfort level or baseline comfort level  Description: Interventions:  - Encourage patient to monitor pain and request assistance  - Assess pain using appropriate pain scale  - Administer analgesics based on type and severity of pain and evaluate response  - Implement non-pharmacological measures as appropriate and evaluate response  - Consider cultural and social influences on pain and pain management  - Notify physician/advanced practitioner if interventions unsuccessful or patient reports new pain  Outcome: Progressing     Problem: METABOLIC, FLUID AND ELECTROLYTES - ADULT  Goal: Electrolytes maintained within normal limits  Description: INTERVENTIONS:  - Monitor labs and assess patient for signs and symptoms of electrolyte imbalances  - Administer electrolyte replacement as ordered  - Monitor response to electrolyte replacements, including repeat lab results as appropriate  - Instruct patient on fluid and nutrition as appropriate  Outcome: Progressing     Problem: METABOLIC, FLUID AND ELECTROLYTES - ADULT  Goal: Glucose maintained within target range  Description: INTERVENTIONS:  - Monitor Blood Glucose as ordered  - Assess for signs and symptoms of hyperglycemia and hypoglycemia  - Administer ordered medications to maintain glucose within target range  - Assess nutritional intake and initiate nutrition service referral as needed  Outcome: Progressing     Problem: GENITOURINARY - ADULT  Goal: Absence of urinary retention  Description: INTERVENTIONS:  - Assess patients ability to void and empty bladder  - Monitor I/O  - Bladder scan as needed  - Discuss with physician/AP medications to alleviate retention as needed  - Discuss catheterization for long term situations as appropriate  Outcome: Progressing     Problem: SAFETY ADULT  Goal: Patient will remain free of falls  Description: INTERVENTIONS:  - Educate patient/family on patient safety including physical limitations  - Instruct patient to call for assistance with activity   - Consult OT/PT to assist with strengthening/mobility   - Keep Call bell within reach  - Keep bed low and locked with side rails adjusted as appropriate  - Keep care items and personal belongings within reach  - Initiate and maintain comfort rounds  - Make Fall Risk Sign visible to staff  - Offer Toileting every 2 Hours, in advance of need  - Initiate/Maintain bed alarm  - Obtain necessary fall risk management equipment: walker  - Apply yellow socks and bracelet for high fall risk patients  - Consider moving patient to room near nurses station  Outcome: Progressing

## 2022-03-01 NOTE — ASSESSMENT & PLAN NOTE
· GARY on CKD 4 secondary to obstructive uropathy    · Status post percutaneous nephrostomy tube placement on 02/25 and Shirley catheter placement  · Continue to hold nephrotoxins  · Continue IV fluids  · Urology with plan for cystoscopy with remove right ureteral stent 2/29/2022

## 2022-03-01 NOTE — ASSESSMENT & PLAN NOTE
Patient fell going to the bathroom 3/1/2022  He was not wearing his socks and slipped on the floor and hit his shoulder and elbow off of the toilet paper dispenser  Reports some shoulder, elbow pain  No deformity on exam  Will check a left shoulder and elbow x-ray

## 2022-03-01 NOTE — ASSESSMENT & PLAN NOTE
Potassium is 6 2 this morning  Treated with insulin/D5, calcium gluconate, sodium bicarbonate with improvement to 4 4  Follow-up BMP in the morning  Consider Nephrology consultation

## 2022-03-01 NOTE — PROGRESS NOTES
Progress Note - Urology  Megan Santos 1945, 76 y o  male MRN: [id number]    Unit/Bed#: E2 -01 Encounter: [id number]    Assessment/Plan:   -Recurrent bladder cancer, BCG refractory, with Hg and CIS UCC of bladder  -Currently undergoing chemotherapy and radiation  -Bilateral upper tract obstruction - s/p bilateral nephrostomy tubes on 02/27/2022  -Bilateral PCNs currently patent draining clear yellow urine  Excellent UOP volumes following placement    -Plan for OR today with Dr Danielle Felix for cystoscopy and right ureteral stent removal    -Enterococcus UTI - on PO Levaquin  -Afebrile VSS today  No leukocytosis  Hemoglobin 8 9  Creatinine remains back down at baseline at 3 0    Subjective:   HPI: Patient continues to report 8/10 urethral pain that intermittently comes and goes  He denies any abdominal or flank pain  He denies any fevers or chills overnight  Review of Systems   Constitutional: Negative for chills and fever  HENT: Negative  Respiratory: Negative  Cardiovascular: Negative  Gastrointestinal: Negative for abdominal pain, nausea and vomiting  Genitourinary: Positive for penile pain (urethral pain, intermittent, 8/10)  Negative for flank pain and hematuria  Musculoskeletal: Negative  Skin: Negative  Neurological: Negative  Objective:  Vitals: Blood pressure 142/84, pulse 64, temperature (!) 97 °F (36 1 °C), temperature source Temporal, resp  rate 18, weight 104 kg (229 lb 4 5 oz), SpO2 98 %  ,Body mass index is 30 67 kg/m²  Physical Exam  Vitals reviewed  Constitutional:       General: He is not in acute distress  Appearance: Normal appearance  He is obese  He is not ill-appearing, toxic-appearing or diaphoretic  HENT:      Head: Normocephalic and atraumatic  Eyes:      Conjunctiva/sclera: Conjunctivae normal    Cardiovascular:      Rate and Rhythm: Normal rate and regular rhythm  Heart sounds: Normal heart sounds     Pulmonary:      Effort: Pulmonary effort is normal  No respiratory distress  Breath sounds: Normal breath sounds  Abdominal:      General: Bowel sounds are normal  There is no distension  Palpations: Abdomen is soft  Tenderness: There is no abdominal tenderness  There is no right CVA tenderness, left CVA tenderness, guarding or rebound  Genitourinary:     Comments: Bilateral PCNs patent with clear yellow urine  Musculoskeletal:         General: Normal range of motion  Cervical back: Normal range of motion  Skin:     General: Skin is warm and dry  Neurological:      General: No focal deficit present  Mental Status: He is alert and oriented to person, place, and time  Psychiatric:         Mood and Affect: Mood normal          Behavior: Behavior normal          Thought Content: Thought content normal          Judgment: Judgment normal        Imaging:  No new imaging  Labs:  Recent Labs     02/27/22  0510 02/28/22  0423 03/01/22  0426   WBC 6 67 6 23 6 29     Recent Labs     02/27/22  0510 02/28/22  0423 03/01/22  0426   HGB 8 2* 8 0* 8 9*     Recent Labs     02/27/22  0510 02/28/22  0423 03/01/22  0426   HCT 23 6* 23 7* 26 4*     Recent Labs     02/27/22  0510 02/28/22  0423 03/01/22  0426 03/01/22  1024   CREATININE 3 63* 3 39* 3 05* 3 01*     Blood cultures x 2 from 2/23: No growth at 5 days     Most recent urine culture from 2/25: 10-19K mixed contaminants    History:  Past Medical History:   Diagnosis Date    Anemia     Last assessed: 9/28/17    Anxiety     Arteriosclerotic cardiovascular disease     Last assessed: 9/28/17    Arthritis     Bladder cancer ([facility] [address] )     bladder- had BCG treatments    Chronic kidney disease     Stage IV    CKD (chronic kidney disease) stage 4, GFR 15-29 ml/min ([facility])     Colon polyp     Coronary artery disease     7 stents    Depression     Diabetes mellitus (HCC)     IDDM    GERD (gastroesophageal reflux disease)     Glaucoma     Hematuria     History of fusion of cervical spine     Hyperlipidemia     Hypertension     Insomnia     Last assessed: 12    Loss of hearing     has hearing aids but usually does not wear them    Other seasonal allergic rhinitis     Last assessed: 2/10/16    PAD (peripheral artery disease) (HCC)     Shortness of breath     on exertion    Spinal stenosis of lumbar region     Transient cerebral ischemia     No Residual    Uses walker     w/c for longer distances     Social History     Socioeconomic History    Marital status: /Civil Union     Spouse name: None    Number of children: None    Years of education: None    Highest education level: None   Occupational History    None   Tobacco Use    Smoking status: Former Smoker     Packs/day: 3 00     Years: 27 00     Pack years: 81 00     Types: Cigarettes     Quit date:      Years since quittin 1    Smokeless tobacco: Never Used   Vaping Use    Vaping Use: Never used   Substance and Sexual Activity    Alcohol use: Not Currently     Comment: beer / liquor    Drug use: Not Currently     Types: Marijuana     Comment: quit 2019 had medical marijuana    Sexual activity: Not Currently   Other Topics Concern    None   Social History Narrative    Consumes 1 cup of coffee and 1 soda per day     Social Determinants of Health     Financial Resource Strain: Not on file   Food Insecurity: Not on file   Transportation Needs: Not on file   Physical Activity: Not on file   Stress: Not on file   Social Connections: Not on file   Intimate Partner Violence: Not on file   Housing Stability: Not on file     Past Surgical History:   Procedure Laterality Date    CARDIAC SURGERY      Cath stent placement  Last assessed: 3/9/17  Interventional Catheterization    CHOLECYSTECTOMY      COLONOSCOPY      CYSTOSCOPY      Diagnostic w/biopsy  Marvell Severs   Last assessed: 14    CYSTOSCOPY W/ URETERAL STENT PLACEMENT Bilateral 10/18/2021    Procedure: bilateral retrogrades, cytology collection;  Surgeon: Roderick Wells MD;  [facility];  Service: Urology    CYSTOURETHROSCOPY      w/cautery  [place]    FL RETROGRADE PYELOGRAM  10/18/2021    FL RETROGRADE PYELOGRAM  10/24/2021    GASTRIC BYPASS      For morbid obesity w/Shaji-en-Y   Resolved: 11/17/09    INCISION AND DRAINAGE OF WOUND Right 2/26/2017    Procedure: INCISION AND DRAINAGE (I&D) EXTREMITY WITH APPLICATION OF GRAFT JACKET;  Surgeon: Ophelia Mars DPM;  [facility];  Service:     INCISION AND DRAINAGE OF WOUND Right 4/25/2017    Procedure: INCISION AND DRAINAGE (I&D) EXTREMITY, APPLICATION OF GRAFT;  Surgeon: Ophelia Mars DPM;  [facility];  Service:     IR BIOPSY OTHER  7/2/2020    IR LOWER EXTREMITY ANGIOGRAM  2/8/2021    IR LOWER EXTREMITY ANGIOGRAM  2/11/2021    IR NEPHROSTOMY TUBE PLACEMENT  2/25/2022    IR PORT PLACEMENT  1/17/2022    IR TUNNELED CENTRAL LINE PLACEMENT  12/24/2020    JOINT REPLACEMENT      christofer knees replaced    FL AMPUTATION METATARSAL+TOE,SINGLE Left 12/21/2020    Procedure: RAY RESECTION FOOT;  Surgeon: Ernst Servin DPM;  [facility];  Service: Podiatry    FL AMPUTATION METATARSAL+TOE,SINGLE Left 12/31/2020    Procedure: 5TH MET RESECTION;  Surgeon: Ernst Servin DPM;  [facility];  Service: Podiatry    FL CYSTOURETHROSCOPY W/IRRIG & EVAC CLOTS N/A 2/10/2021    Procedure: CYSTOSCOPY EVACUATION OF CLOTS, fulguration;  Surgeon: Natividad Shelley MD;  [facility];  Service: Urology    FL CYSTOURETHROSCOPY W/IRRIG & EVAC CLOTS N/A 10/24/2021    Procedure: CYSTOSCOPY EVACUATION OF CLOT, fulguration of bleeding vessels, right ureter stent placement, retrograde pyelogram;  Surgeon: Channing Sunshine MD;  [facility];  Service: Urology    FL CYSTOURETHROSCOPY,BIOPSY N/A 8/16/2016    Procedure: CYSTOSCOPY WITH BIOPSIES;  Surgeon: Justo Avelar MD;  [facility];  Service: Urology    FL CYSTOURETHROSCOPY,FULGUR <0 5 CM LESN N/A 11/19/2020    Procedure: CYSTO W/BIOPSIES, transurethral prostate bx;  Surgeon: Philomena Bonner MD;  [facility];  Service: Urology    [place] CYSTOURETHROSCOPY,FULGUR >5 CM LESN Bilateral 10/18/2021    Procedure: TRANSURETHRAL RESECTION OF BLADDER TUMOR (TURBT);   Surgeon: Wai Vazquez MD;  [facility];  Service: Urology    [place] Francisca Robi 3RD+ ORD [address] PEL/LXTR [facility] Left 2/8/2021    Procedure: LEG angiogram, CO2 w/limited contrast with balloon angioplasty postertior tibial artery;  Surgeon: Santana Morin MD;  [facility];  Service: Vascular    ROTATOR CUFF REPAIR      SMALL INTESTINE SURGERY      Surgery Shaji-en-Y    SPINAL FUSION      lumbar and cervical fusions    VAC DRESSING APPLICATION Right 9/03/8418    Procedure: APPLICATION VAC DRESSING;  Surgeon: Alva Burleson DPM;  [facility];  Service:     WOUND DEBRIDEMENT Left 2/16/2021    Procedure: FOOT DEBRIDE, [address] OUT w/graft application;  Surgeon: Alva Burleson DPM;  [facility];  Service: Podiatry     Family History   Problem Relation Age of Onset    Diabetes Mother     Heart disease Mother     Other Mother         High blood pressure    Heart disease Father     Diabetes Sister     Other Sister         High blood pressure    Kidney disease Sister     Heart disease Brother     Other Brother         High blood pressure       [place], [place]  Date: 3/1/2022 Time: 1:14 PM

## 2022-03-01 NOTE — ANESTHESIA POSTPROCEDURE EVALUATION
Post-Op Assessment Note    CV Status:  Stable    Pain management: adequate     Mental Status:  Awake   Hydration Status:  Stable   PONV Controlled:  None   Airway Patency:  Patent      Post Op Vitals Reviewed: Yes      Staff: Anesthesiologist         No complications documented      /87 (03/01/22 1444)    Temp     Pulse 68 (03/01/22 1444)   Resp 12 (03/01/22 1444)    SpO2 98 % (03/01/22 1444)

## 2022-03-01 NOTE — CASE MANAGEMENT
Case Management Discharge Planning Note    [facility]  Location OR [facility]OR [address] MRN [id number]  : 1945 Date 3/1/2022       Current Admission Date: 2022  Current Admission Diagnosis:Abnormal CT scan, bladder   Patient Active Problem List    Diagnosis Date Noted    Retained ureteral stent     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[address] ) 2022    Embolism and 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 ([facility] [address] ) 2022    Abnormal CT scan, bladder 2022    Port-A-Cath in place 2022    Continuous opioid dependence ([address] ) 2021    Hematuria 10/24/2021    Acute urinary retention 10/21/2021    Chronic pain syndrome 2021    Lumbar spondylosis     Chronic bronchitis ([address] ) 2021    Moderate major depression, single episode ([address] ) 2021    Thrombocytopenia ([facility] ) 2021    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 2021    Gross hematuria 2021    PAD (peripheral artery disease) ([address] )     Left carotid bruit     Anemia of chronic disease 2020    Preoperative clearance 2020    Chronic anemia 10/10/2020    Diabetic ulcer of left foot associated with type 2 diabetes mellitus, 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 ([address] ) 10/08/2020    Acute kidney injury superimposed on CKD ([address] )     Dysuria 2020    Diabetic polyneuropathy associated with type 2 diabetes mellitus ([address] ) 2020    Abnormal MRI, lumbar spine 2020    Malignant neoplasm of anterior wall of urinary bladder ([address] )     Acute renal failure (ARF) ([address] ) 2020    Secondary renal hyperparathyroidism ([address] ) 2020    Acute cystitis with hematuria 2019    Preop examination 2019    Superficial phlebitis 2019    Arteriosclerosis of artery of extremity ([address] ) 2019    Stable angina pectoris ([address] ) 2019    Herpes zoster without complication     Localized edema 2019    Back pain 01/31/2019    Degeneration of lumbar intervertebral disc 12/03/2018    Primary osteoarthritis of left knee 03/16/2018    Bladder carcinoma ([facility] [address] ) 08/16/2016    Presence of stent in coronary artery 08/16/2016    Hypertension with renal disease 10/29/2015    Hyperlipidemia 10/29/2015    Cystitis due to intravesical BCG administration 09/24/2015    Coronary artery disease of native artery of native heart with stable angina pectoris ([facility] [address] ) 05/19/2011    Type 2 diabetes mellitus, with long-term current use of insulin ([facility] ) 01/09/2004      LOS (days): 6  Geometric Mean LOS (GMLOS) (days): 2 90  Days to GMLOS:-3     OBJECTIVE:  Risk of Unplanned Readmission Score: 48      Current admission status: Inpatient   Preferred Pharmacy:   [place] [address]  Phone: [phone] Fax: [address] [facility] , [address]  Phone: [phone] Fax: [phone]    Primary Care Provider: Lana Almanzar MD    Primary Insurance: MEDICARE  Secondary Insurance: BLUE CROSS    DISCHARGE DETAILS:    [address]         Is the patient interested in [facility] at discharge?: Yes  [address] requested[de-identified] Oliver Almanzasmita Name[de-identified] Other ([address] [place] [address])  [facility] Provider[de-identified] PCP  Home Health Services Needed[de-identified] Gait/ADL Training,Evaluate Functional Status and Safety,Strengthening/Theraputic Exercises to Improve Function,Wound/Ostomy Care  Homebound Criteria Met[de-identified] Requires the Assistance of Another Person for Safe Ambulation or to Leave the Home  Supporting Clincal Findings[de-identified] Limited Endurance    Treatment Team Recommendation: Home with [address]  Discharge Destination Plan[de-identified] Home with [address]

## 2022-03-01 NOTE — PLAN OF CARE
Problem: PAIN - ADULT  Goal: Verbalizes/displays adequate comfort level or baseline comfort level  Description: Interventions:  - Encourage patient to monitor pain and request assistance  - Assess pain using appropriate pain scale  - Administer analgesics based on type and severity of pain and evaluate response  - Implement non-pharmacological measures as appropriate and evaluate response  - Consider cultural and social influences on pain and pain management  - Notify physician/advanced practitioner if interventions unsuccessful or patient reports new pain  Outcome: Not Progressing     Patient still in significant amount of pain throughout the night  Pain medications given as ordered  Patient NPO for his stent removal procedure on 3/1/2022  Call bell within reach

## 2022-03-02 ENCOUNTER — TELEPHONE (OUTPATIENT)
Dept: UROLOGY | Facility: AMBULATORY SURGERY CENTER | Age: 77
End: 2022-03-02

## 2022-03-02 VITALS
DIASTOLIC BLOOD PRESSURE: 75 MMHG | SYSTOLIC BLOOD PRESSURE: 119 MMHG | BODY MASS INDEX: 31.05 KG/M2 | WEIGHT: 229.28 LBS | OXYGEN SATURATION: 100 % | RESPIRATION RATE: 16 BRPM | TEMPERATURE: 98 F | HEIGHT: 72 IN | HEART RATE: 68 BPM

## 2022-03-02 LAB
ANION GAP SERPL CALCULATED.3IONS-SCNC: 11 MMOL/L (ref 4–13)
BUN SERPL-MCNC: 47 MG/DL (ref 5–25)
CALCIUM SERPL-MCNC: 8.7 MG/DL (ref 8.3–10.1)
CHLORIDE SERPL-SCNC: 106 MMOL/L (ref 100–108)
CO2 SERPL-SCNC: 20 MMOL/L (ref 21–32)
CREAT SERPL-MCNC: 2.9 MG/DL (ref 0.6–1.3)
ERYTHROCYTE [DISTWIDTH] IN BLOOD BY AUTOMATED COUNT: 15.9 % (ref 11.6–15.1)
GFR SERPL CREATININE-BSD FRML MDRD: 19 ML/MIN/1.73SQ M
GLUCOSE SERPL-MCNC: 131 MG/DL (ref 65–140)
GLUCOSE SERPL-MCNC: 133 MG/DL (ref 65–140)
GLUCOSE SERPL-MCNC: 226 MG/DL (ref 65–140)
HCT VFR BLD AUTO: 28.8 % (ref 36.5–49.3)
HGB BLD-MCNC: 9.2 G/DL (ref 12–17)
MCH RBC QN AUTO: 31.6 PG (ref 26.8–34.3)
MCHC RBC AUTO-ENTMCNC: 31.9 G/DL (ref 31.4–37.4)
MCV RBC AUTO: 99 FL (ref 82–98)
PLATELET # BLD AUTO: 174 THOUSANDS/UL (ref 149–390)
PMV BLD AUTO: 10.1 FL (ref 8.9–12.7)
POTASSIUM SERPL-SCNC: 4.8 MMOL/L (ref 3.5–5.3)
RBC # BLD AUTO: 2.91 MILLION/UL (ref 3.88–5.62)
SODIUM SERPL-SCNC: 137 MMOL/L (ref 136–145)
WBC # BLD AUTO: 5.18 THOUSAND/UL (ref 4.31–10.16)

## 2022-03-02 PROCEDURE — 99232 SBSQ HOSP IP/OBS MODERATE 35: CPT | Performed by: PHYSICIAN ASSISTANT

## 2022-03-02 PROCEDURE — 85027 COMPLETE CBC AUTOMATED: CPT | Performed by: INTERNAL MEDICINE

## 2022-03-02 PROCEDURE — 80048 BASIC METABOLIC PNL TOTAL CA: CPT | Performed by: INTERNAL MEDICINE

## 2022-03-02 PROCEDURE — 99239 HOSP IP/OBS DSCHRG MGMT >30: CPT | Performed by: INTERNAL MEDICINE

## 2022-03-02 PROCEDURE — 82948 REAGENT STRIP/BLOOD GLUCOSE: CPT

## 2022-03-02 RX ORDER — OXYBUTYNIN CHLORIDE 10 MG/1
10 TABLET, EXTENDED RELEASE ORAL DAILY
Status: DISCONTINUED | OUTPATIENT
Start: 2022-03-02 | End: 2022-03-02 | Stop reason: HOSPADM

## 2022-03-02 RX ORDER — OXYBUTYNIN CHLORIDE 10 MG/1
10 TABLET, EXTENDED RELEASE ORAL DAILY
Qty: 30 TABLET | Refills: 0 | Status: SHIPPED | OUTPATIENT
Start: 2022-03-03 | End: 2022-05-10 | Stop reason: SDUPTHER

## 2022-03-02 RX ORDER — PHENAZOPYRIDINE HYDROCHLORIDE 100 MG/1
100 TABLET, FILM COATED ORAL
Qty: 10 TABLET | Refills: 0 | Status: SHIPPED | OUTPATIENT
Start: 2022-03-02 | End: 2022-03-05

## 2022-03-02 RX ORDER — PHENAZOPYRIDINE HYDROCHLORIDE 100 MG/1
100 TABLET, FILM COATED ORAL
Status: DISCONTINUED | OUTPATIENT
Start: 2022-03-02 | End: 2022-03-02 | Stop reason: HOSPADM

## 2022-03-02 RX ORDER — OXYCODONE HYDROCHLORIDE AND ACETAMINOPHEN 5; 325 MG/1; MG/1
1 TABLET ORAL EVERY 6 HOURS PRN
Qty: 10 TABLET | Refills: 0 | Status: SHIPPED | OUTPATIENT
Start: 2022-03-02 | End: 2022-03-02 | Stop reason: HOSPADM

## 2022-03-02 RX ORDER — OXYCODONE HYDROCHLORIDE AND ACETAMINOPHEN 5; 325 MG/1; MG/1
1 TABLET ORAL EVERY 6 HOURS PRN
Qty: 10 TABLET | Refills: 0 | Status: SHIPPED | OUTPATIENT
Start: 2022-03-02 | End: 2022-03-11

## 2022-03-02 RX ADMIN — HYDROCODONE BITARTRATE AND ACETAMINOPHEN 1 TABLET: 5; 325 TABLET ORAL at 09:50

## 2022-03-02 RX ADMIN — PANTOPRAZOLE SODIUM 40 MG: 40 TABLET, DELAYED RELEASE ORAL at 05:18

## 2022-03-02 RX ADMIN — DULOXETINE HYDROCHLORIDE 20 MG: 20 CAPSULE, DELAYED RELEASE PELLETS ORAL at 09:49

## 2022-03-02 RX ADMIN — HEPARIN SODIUM 5000 UNITS: 5000 INJECTION INTRAVENOUS; SUBCUTANEOUS at 05:13

## 2022-03-02 RX ADMIN — LEVOFLOXACIN 250 MG: 250 TABLET, FILM COATED ORAL at 11:28

## 2022-03-02 RX ADMIN — INSULIN LISPRO 2 UNITS: 100 INJECTION, SOLUTION INTRAVENOUS; SUBCUTANEOUS at 11:29

## 2022-03-02 RX ADMIN — METOPROLOL SUCCINATE 100 MG: 50 TABLET, EXTENDED RELEASE ORAL at 09:38

## 2022-03-02 RX ADMIN — ISOSORBIDE MONONITRATE 30 MG: 30 TABLET, EXTENDED RELEASE ORAL at 05:13

## 2022-03-02 RX ADMIN — PHENAZOPYRIDINE HYDROCHLORIDE 100 MG: 100 TABLET ORAL at 11:28

## 2022-03-02 RX ADMIN — OXYBUTYNIN CHLORIDE 10 MG: 10 TABLET, EXTENDED RELEASE ORAL at 09:49

## 2022-03-02 RX ADMIN — CALCITRIOL CAPSULES 0.25 MCG 0.25 MCG: 0.25 CAPSULE ORAL at 09:37

## 2022-03-02 RX ADMIN — SERTRALINE HYDROCHLORIDE 50 MG: 50 TABLET ORAL at 09:37

## 2022-03-02 RX ADMIN — HYDROMORPHONE HYDROCHLORIDE 1 MG: 1 INJECTION, SOLUTION INTRAMUSCULAR; INTRAVENOUS; SUBCUTANEOUS at 05:13

## 2022-03-02 RX ADMIN — DOCUSATE SODIUM 50MG AND SENNOSIDES 8.6MG 1 TABLET: 8.6; 5 TABLET, FILM COATED ORAL at 09:37

## 2022-03-02 RX ADMIN — EZETIMIBE 10 MG: 10 TABLET ORAL at 05:13

## 2022-03-02 RX ADMIN — ASPIRIN 81 MG: 81 TABLET, COATED ORAL at 09:37

## 2022-03-02 RX ADMIN — HYDROMORPHONE HYDROCHLORIDE 1 MG: 1 INJECTION, SOLUTION INTRAMUSCULAR; INTRAVENOUS; SUBCUTANEOUS at 11:31

## 2022-03-02 NOTE — TELEPHONE ENCOUNTER
Patient still in patient    Post Op Note    Vanita Bushnell is a 76 y o  male s/p Cysto R stent removal performed 3/1/2022  [place] Bushnell is a patient of Dr Zaira Branch and is seen at the [facility] office       Patient has Bladder cancer and has b/l nephrostomy tubes

## 2022-03-02 NOTE — ASSESSMENT & PLAN NOTE
Patient fell going to the bathroom 3/1/2022  He was not wearing his socks and slipped on the floor and hit his shoulder and elbow off of the toilet paper dispenser  Left shoulder and elbow x-ray negative  Supportive care

## 2022-03-02 NOTE — ASSESSMENT & PLAN NOTE
Lab Results   Component Value Date    HGBA1C 7 7 (H) 09/03/2021       Recent Labs     03/01/22  1611 03/01/22  2106 03/02/22  0557 03/02/22  1123   POCGLU 112 283* 131 226*     A m  Hypoglycemia  Continue Lantus 10 units q h s   Plus sliding scale insulin

## 2022-03-02 NOTE — DISCHARGE SUMMARY
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  · Status post bilateral percutaneous nephrostomy tube placement on 02/25 and Shirley catheter placement  · Status post cystoscopy with remove right ureteral stent 3/2/22  · Creatinine now at baseline  · Bilateral PCN draining well with clear urine  · Outpatient follow-up with primary nephrologist  · Outpatient follow-up with Urology    Hyperkalemia  Assessment & Plan  Treated with insulin/D5, calcium gluconate, sodium bicarbonate with improvement to 4 4  Resolved    Fall  Assessment & Plan  Patient fell going to the bathroom 3/1/2022  He was not wearing his socks and slipped on the floor and hit his shoulder and elbow off of the toilet paper dispenser  Left shoulder and elbow x-ray negative  Supportive care    Type 2 diabetes mellitus, with long-term current use of insulin [facility])  Assessment & Plan  Lab Results   Component Value Date    HGBA1C 7 7 (H) 09/03/2021       Recent Labs     03/01/22  1611 03/01/22  2106 03/02/22  0557 03/02/22  1123   POCGLU 112 283* 131 226*     A m  Hypoglycemia  Continue Lantus 10 units q h s  Plus sliding scale insulin    Hyperlipidemia  Assessment & Plan  · Continue Zetia daily    Hypertension with renal disease  Assessment & Plan  · Continue metoprolol 100 mg daily    Bladder carcinoma (HCC)  Assessment & Plan  · Stage II bladder carcinoma followed by Urology    · Status post recent TURBT 10/18/21  · Previously treated with BCG instillation  · Outpatient follow-up with urology for further treatment    Coronary artery disease of native artery of native heart with stable angina pectoris ([facility] [address] )  Assessment & Plan  · Stable no chest pain  · Continue aspirin and Toprol  mg daily    * Abnormal CT scan, bladder  Assessment & Plan  · CT with worsening diffuse bladder wall thickening, could be cystitis versus progression of tumor  · Urine culture on 02/17/2022 showed growth of Enterococcus faecalis  Patient was treated with 7 day course of Levaquin  · Urine culture on admission was negative for growth  · Intraoperative culture from the right nephrostomy, is growing Enterococcus faecalis  · Patient is likely colonized  · Patient did receive treatment with Levaquin perioperatively  · Urology adjusted medication for bladder spasms on discharge Ditropan XL daily, peridium x3 days      Discharging Physician / Practitioner: Tami Greene DO  PCP: Marisela Lopez MD  Admission Date:   Admission Orders (From admission, onward)     Ordered        02/23/22 1627  Inpatient Admission  Once                      Discharge Date: 03/02/22    Medical Problems             Resolved Problems  Date Reviewed: 2/27/2022    None                Consultations During Hospital Stay: Urology     Procedures Performed:  IR bilateral PCN 2/25/22, right ureteral stent removal 3/1/2022    Significant Findings / Test Results:     XR shoulder 2+ vw left    Result Date: 3/2/2022  Impression: No acute osseous abnormality  Workstation performed: [id number]     XR elbow 2 vw left    Result Date: 3/2/2022  Impression: No acute osseous abnormality  Workstation performed: [id number]       Incidental Findings: None    Test Results Pending at Discharge (will require follow up): None     Outpatient Tests Requested: None    Reason for Admission:  Rectal, penile pain    Hospital Course:     Alcides Vasquez is a 76 y o  male With past medical history of bladder cancer, CAD, CKD, type 2 diabetes, hypertension, hyperlipidemia presented with rectal in penile pain  On admission patient was found to have GARY with CT revealing moderate bilateral hydronephrosis    Patient was evaluated by Urology and underwent IR bilateral PCN 2/25/2022  Patient then underwent cystoscopy with right ureteral stent removal 3/1/2022  Patient's renal function returned to baseline prior to discharge  Medications were adjusted for bladder spasms  Plan for outpatient follow-up with Urology, IR  Discharge home with VNA  Please see above list of diagnoses and related plan for additional information  Condition at Discharge: stable     Discharge Day Visit / Exam:     Subjective:    Patient seen and evaluated at bedside  Reports he still has some intermittent penile discomfort  We discussed medication plans for discharge  Vitals: Blood Pressure: 119/75 (03/02/22 1121)  Pulse: 68 (03/02/22 1121)  Temperature: 98 °F (36 7 °C) (03/02/22 1121)  Temp Source: Tympanic (03/02/22 1121)  Respirations: 16 (03/02/22 1121)  Height: 6' (182 9 cm) (03/02/22 0700)  Weight - Scale: 104 kg (229 lb 4 5 oz) (03/02/22 0700)  SpO2: 100 % (03/02/22 1121)  Exam:   Physical Exam  Vitals reviewed  Constitutional:       General: He is not in acute distress  Appearance: He is well-developed  He is not ill-appearing, toxic-appearing or diaphoretic  HENT:      Head: Normocephalic and atraumatic  Mouth/Throat:      Mouth: Mucous membranes are moist       Pharynx: No oropharyngeal exudate  Eyes:      General: No scleral icterus  Extraocular Movements: Extraocular movements intact  Conjunctiva/sclera: Conjunctivae normal    Cardiovascular:      Rate and Rhythm: Normal rate and regular rhythm  Heart sounds: Normal heart sounds  Pulmonary:      Effort: Pulmonary effort is normal  No respiratory distress  Breath sounds: Normal breath sounds  No wheezing or rales  Abdominal:      General: There is no distension  Palpations: Abdomen is soft  Tenderness: There is no abdominal tenderness  There is no guarding or rebound     Musculoskeletal:         General: No swelling, tenderness or deformity  Skin:     General: Skin is warm and dry  Neurological:      General: No focal deficit present  Mental Status: He is alert  Mental status is at baseline  Psychiatric:         Mood and Affect: Mood normal          Behavior: Behavior normal          Thought Content: Thought content normal          Judgment: Judgment normal            Discharge instructions/Information to patient and family:   See after visit summary for information provided to patient and family  Provisions for Follow-Up Care:  See after visit summary for information related to follow-up care and any pertinent home health orders  Disposition:     Home with home health care     Discharge Statement:  I spent 60 minutes discharging the patient  This time was spent on the day of discharge  I had direct contact with the patient on the day of discharge  Greater than 50% of the total time was spent examining patient, answering all patient questions, arranging and discussing plan of care with patient as well as directly providing post-discharge instructions  Additional time then spent on discharge activities  Discharge Medications:  See after visit summary for reconciled discharge medications provided to patient and family        ** Please Note: This note has been constructed using a voice recognition system **

## 2022-03-02 NOTE — ASSESSMENT & PLAN NOTE
· CT with worsening diffuse bladder wall thickening, could be cystitis versus progression of tumor  · Urine culture on 02/17/2022 showed growth of Enterococcus faecalis  Patient was treated with 7 day course of Levaquin  · Urine culture on admission was negative for growth  · Intraoperative culture from the right nephrostomy, is growing Enterococcus faecalis  · Patient is likely colonized    · Patient did receive treatment with Levaquin perioperatively  · Urology adjusted medication for bladder spasms on discharge Ditropan XL daily, peridium x3 days

## 2022-03-02 NOTE — PROGRESS NOTES
At the time of discharge RN attempted to teach B/L nephrostomy tubes care and flushing to pt and his wife  Wife is very anxious and stating that "visiting nurses will have to come every day" because she won't be able to do it  RN demonstrated flush and tube care and assured pt and wife that the visiting nurses will be at the house tomorrow  Provided pt with clean wound care supplies  After multiple Pt and wife were asked multiple times if they have any questions or concerns they stated they don't have any

## 2022-03-02 NOTE — PLAN OF CARE
Problem: PAIN - ADULT  Goal: Verbalizes/displays adequate comfort level or baseline comfort level  Description: Interventions:  - Encourage patient to monitor pain and request assistance  - Assess pain using appropriate pain scale  - Administer analgesics based on type and severity of pain and evaluate response  - Implement non-pharmacological measures as appropriate and evaluate response  - Consider cultural and social influences on pain and pain management  - Notify physician/advanced practitioner if interventions unsuccessful or patient reports new pain  Outcome: Progressing     Problem: INFECTION - ADULT  Goal: Absence or prevention of progression during hospitalization  Description: INTERVENTIONS:  - Assess and monitor for signs and symptoms of infection  - Monitor lab/diagnostic results  - Monitor all insertion sites, i e  indwelling lines, tubes, and drains  - Monitor endotracheal if appropriate and nasal secretions for changes in amount and color  - [place] appropriate cooling/warming therapies per order  - Administer medications as ordered  - Instruct and encourage patient and family to use good hand hygiene technique  - Identify and instruct in appropriate isolation precautions for identified infection/condition  Outcome: Progressing  Goal: Absence of fever/infection during neutropenic period  Description: INTERVENTIONS:  - Monitor WBC    Outcome: Progressing     Problem: SAFETY ADULT  Goal: Patient will remain free of falls  Description: INTERVENTIONS:  - Educate patient/family on patient safety including physical limitations  - Instruct patient to call for assistance with activity   - Consult OT/PT to assist with strengthening/mobility   - Keep Call bell within reach  - Keep bed low and locked with side rails adjusted as appropriate  - Keep care items and personal belongings within reach  - Initiate and maintain comfort rounds  - Make Fall Risk Sign visible to staff  - Offer Toileting every 2 Hours, in advance of need  - Initiate/Maintain BED alarm  - Obtain necessary fall risk management equipment  - Apply yellow socks and bracelet for high fall risk patients  - Consider moving patient to room near nurses station  Outcome: Progressing  Goal: Maintain or return to baseline ADL function  Description: INTERVENTIONS:  -  Assess patient's ability to carry out ADLs; assess patient's baseline for ADL function and identify physical deficits which impact ability to perform ADLs (bathing, care of mouth/teeth, toileting, grooming, dressing, etc )  - Assess/evaluate cause of self-care deficits   - Assess range of motion  - Assess patient's mobility; develop plan if impaired  - Assess patient's need for assistive devices and provide as appropriate  - Encourage maximum independence but intervene and supervise when necessary  - Involve family in performance of ADLs  - Assess for home care needs following discharge   - Consider OT consult to assist with ADL evaluation and planning for discharge  - Provide patient education as appropriate  Outcome: Progressing  Goal: Maintains/Returns to pre admission functional level  Description: INTERVENTIONS:  - Perform BMAT or MOVE assessment daily    - Set and communicate daily mobility goal to care team and patient/family/caregiver  - Collaborate with rehabilitation services on mobility goals if consulted  - Perform Range of Motion 3 times a day  - Reposition patient every 2 hours    - Dangle patient 3 times a day  - Stand patient 3 times a day  - Ambulate patient 3 times a day  - Out of bed to chair 3 times a day   - Out of bed for meals 3 times a day  - Out of bed for toileting  - Record patient progress and toleration of activity level   Outcome: Progressing     Problem: DISCHARGE PLANNING  Goal: Discharge to home or other facility with appropriate resources  Description: INTERVENTIONS:  - Identify barriers to discharge w/patient and caregiver  - Arrange for needed discharge resources and transportation as appropriate  - Identify discharge learning needs (meds, wound care, etc )  - Arrange for interpretive services to assist at discharge as needed  - Refer to Case Management Department for coordinating discharge planning if the patient needs post-hospital services based on physician/advanced practitioner order or complex needs related to functional status, cognitive ability, or social support system  Outcome: Progressing     Problem: Knowledge Deficit  Goal: Patient/family/caregiver demonstrates understanding of disease process, treatment plan, medications, and discharge instructions  Description: Complete learning assessment and assess knowledge base    Interventions:  - Provide teaching at level of understanding  - Provide teaching via preferred learning methods  Outcome: Progressing     Problem: DISCHARGE PLANNING - CARE MANAGEMENT  Goal: Discharge to post-acute care or home with appropriate resources  Description: INTERVENTIONS:  - Conduct assessment to determine patient/family and health care team treatment goals, and need for post-acute services based on payer coverage, community resources, and patient preferences, and barriers to discharge  - Address psychosocial, clinical, and financial barriers to discharge as identified in assessment in conjunction with the patient/family and health care team  - Arrange appropriate level of post-acute services according to patients   needs and preference and payer coverage in collaboration with the physician and health care team  - Communicate with and update the patient/family, physician, and health care team regarding progress on the discharge plan  - Arrange appropriate transportation to post-acute venues  Outcome: Progressing     Problem: Potential for Falls  Goal: Patient will remain free of falls  Description: INTERVENTIONS:  - Educate patient/family on patient safety including physical limitations  - Instruct patient to call for assistance with activity   - Consult OT/PT to assist with strengthening/mobility   - Keep Call bell within reach  - Keep bed low and locked with side rails adjusted as appropriate  - Keep care items and personal belongings within reach  - Initiate and maintain comfort rounds  - Make Fall Risk Sign visible to staff  - Offer Toileting every 2 Hours, in advance of need  - Initiate/Maintain bed alarm  - Obtain necessary fall risk management equipment  - Apply yellow socks and bracelet for high fall risk patients  - Consider moving patient to room near nurses station  Outcome: Progressing     Problem: Prexisting or High Potential for Compromised Skin Integrity  Goal: Skin integrity is maintained or improved  Description: INTERVENTIONS:  - Identify patients at risk for skin breakdown  - Assess and monitor skin integrity  - Assess and monitor nutrition and hydration status  - Monitor labs   - Assess for incontinence   - Turn and reposition patient  - Assist with mobility/ambulation  - Relieve pressure over bony prominences  - Avoid friction and shearing  - Provide appropriate hygiene as needed including keeping skin clean and dry  - Evaluate need for skin moisturizer/barrier cream  - Collaborate with interdisciplinary team   - Patient/family teaching  - Consider wound care consult   Outcome: Progressing     Problem: GENITOURINARY - ADULT  Goal: Maintains or returns to baseline urinary function  Description: INTERVENTIONS:  - Assess urinary function  - Encourage oral fluids to ensure adequate hydration if ordered  - Administer IV fluids as ordered to ensure adequate hydration  - Administer ordered medications as needed  - Offer frequent toileting  - Follow urinary retention protocol if ordered  Outcome: Progressing  Goal: Absence of urinary retention  Description: INTERVENTIONS:  - Assess patients ability to void and empty bladder  - Monitor I/O  - Bladder scan as needed  - Discuss with physician/AP medications to alleviate retention as needed  - Discuss catheterization for long term situations as appropriate  Outcome: Progressing  Goal: Urinary catheter remains patent  Description: INTERVENTIONS:  - Assess patency of urinary catheter  - If patient has a chronic wagner, consider changing catheter if non-functioning  - Follow guidelines for intermittent irrigation of non-functioning urinary catheter  Outcome: Progressing     Problem: MOBILITY - ADULT  Goal: Maintain or return to baseline ADL function  Description: INTERVENTIONS:  -  Assess patient's ability to carry out ADLs; assess patient's baseline for ADL function and identify physical deficits which impact ability to perform ADLs (bathing, care of mouth/teeth, toileting, grooming, dressing, etc )  - Assess/evaluate cause of self-care deficits   - Assess range of motion  - Assess patient's mobility; develop plan if impaired  - Assess patient's need for assistive devices and provide as appropriate  - Encourage maximum independence but intervene and supervise when necessary  - Involve family in performance of ADLs  - Assess for home care needs following discharge   - Consider OT consult to assist with ADL evaluation and planning for discharge  - Provide patient education as appropriate  Outcome: Progressing  Goal: Maintains/Returns to pre admission functional level  Description: INTERVENTIONS:  - Perform BMAT or MOVE assessment daily    - Set and communicate daily mobility goal to care team and patient/family/caregiver  - Collaborate with rehabilitation services on mobility goals if consulted  - Perform Range of Motion 3 times a day  - Reposition patient every 2 hours    - Dangle patient 3 times a day  - Stand patient 3 times a day  - Ambulate patient 3 times a day  - Out of bed to chair 3 times a day   - Out of bed for meals 3 times a day  - Out of bed for toileting  - Record patient progress and toleration of activity level   Outcome: Progressing     Problem: METABOLIC, FLUID AND ELECTROLYTES - ADULT  Goal: Electrolytes maintained within normal limits  Description: INTERVENTIONS:  - Monitor labs and assess patient for signs and symptoms of electrolyte imbalances  - Administer electrolyte replacement as ordered  - Monitor response to electrolyte replacements, including repeat lab results as appropriate  - Instruct patient on fluid and nutrition as appropriate  Outcome: Progressing  Goal: Glucose maintained within target range  Description: INTERVENTIONS:  - Monitor Blood Glucose as ordered  - Assess for signs and symptoms of hyperglycemia and hypoglycemia  - Administer ordered medications to maintain glucose within target range  - Assess nutritional intake and initiate nutrition service referral as needed  Outcome: Progressing

## 2022-03-02 NOTE — ASSESSMENT & PLAN NOTE
· GARY on CKD 4 secondary to obstructive uropathy    · Status post bilateral percutaneous nephrostomy tube placement on 02/25 and Shirley catheter placement  · Status post cystoscopy with remove right ureteral stent 3/2/22  · Creatinine now at baseline  · Bilateral PCN draining well with clear urine  · Outpatient follow-up with primary nephrologist  · Outpatient follow-up with Urology

## 2022-03-02 NOTE — PROGRESS NOTES
Progress Note - Urology  Paul Marin 1945, 76 y o  male MRN: [id number]    Unit/Bed#: E2 -01 Encounter: [id number]  Assessment & Plan  Bladder Cancer  Bilateral upper urinary tract obstruction  Continue chemotherapy/radiation with oncology teams  Now with bilateral nephrostomy tubes (placed 2/25/22)  Right ureteral stent removed (3/1/22)  Creatinine improved 2 9  Analgesia for bladder spasms- switched to scheduled ditropan 10mg XL daily, with pyridium 200mg TID x 3 days  He completed levaquin for enterococcus UTI this week  Will go home today    Subjective: feels the same  Right ureteral stent is now out  Now just has bilateral nephrostomy tubes  Urine there is clear yellow  Bladder and penis pain is flared up since  Review of Systems   Constitutional: Negative for activity change, appetite change, chills, fever and unexpected weight change  HENT: Negative  Respiratory: Negative  Negative for shortness of breath  Cardiovascular: Negative  Negative for chest pain  Gastrointestinal: Negative for abdominal pain, diarrhea, nausea and vomiting  Endocrine: Negative  Genitourinary: Positive for dysuria and penile pain  Negative for decreased urine volume, difficulty urinating, flank pain, frequency, hematuria, testicular pain and urgency  Musculoskeletal: Negative for back pain and gait problem  Skin: Negative  Allergic/Immunologic: Negative  Neurological: Negative  Hematological: Negative for adenopathy  Does not bruise/bleed easily  Objective:  Vitals: Blood pressure 119/75, pulse 68, temperature 98 °F (36 7 °C), temperature source Tympanic, resp  rate 16, height 6' (1 829 m), weight 104 kg (229 lb 4 5 oz), SpO2 100 %  ,Body mass index is 31 1 kg/m²      Intake/Output Summary (Last 24 hours) at 3/2/2022 1138  Last data filed at 3/2/2022 0848  Gross per 24 hour   Intake 440 ml   Output 1015 ml   Net -575 ml     Invasive Devices  Report    Central Venous Catheter Line Port A Cath 01/17/22 Right Internal jugular 44 days          Peripheral Intravenous Line            Peripheral IV 02/27/22 Right Forearm 2 days          Drain            Nephrostomy Right 1 10 2 Fr  5 days    Nephrostomy Left 2 10 2 Fr  4 days                Physical Exam  Vitals and nursing note reviewed  Constitutional:       Appearance: He is well-developed  HENT:      Head: Normocephalic and atraumatic  Cardiovascular:      Rate and Rhythm: Normal rate and regular rhythm  Heart sounds: Normal heart sounds  No murmur heard  Pulmonary:      Effort: Pulmonary effort is normal       Breath sounds: Normal breath sounds  Abdominal:      General: Bowel sounds are normal       Palpations: Abdomen is soft  Comments: Bilateral PCNs clear yellow urine   Musculoskeletal:         General: Normal range of motion  Skin:     General: Skin is warm and dry  Capillary Refill: Capillary refill takes less than 2 seconds  Coloration: Skin is not pale  Neurological:      Mental Status: He is alert and oriented to person, place, and time           Labs:  Recent Labs     02/28/22  0423 03/01/22  0426 03/02/22  0432   WBC 6 23 6 29 5 18     Recent Labs     02/28/22  0423 03/01/22  0426 03/02/22  0432   HGB 8 0* 8 9* 9 2*       Recent Labs     02/28/22  0423 03/01/22  0426 03/01/22  1024 03/02/22  0559   CREATININE 3 39* 3 05* 3 01* 2 90*       History:    Past Medical History:   Diagnosis Date    Anemia     Last assessed: 9/28/17    Anxiety     Arteriosclerotic cardiovascular disease     Last assessed: 9/28/17    Arthritis     Bladder cancer ([facility] [address] )     bladder- had BCG treatments    Chronic kidney disease     Stage IV    CKD (chronic kidney disease) stage 4, GFR 15-29 ml/min ([facility])     Colon polyp     Coronary artery disease     7 stents    Depression     Diabetes mellitus (HCC)     IDDM    GERD (gastroesophageal reflux disease)     Glaucoma     Hematuria     History of fusion of cervical spine     Hyperlipidemia     Hypertension     Insomnia     Last assessed: 11/14/12    Loss of hearing     has hearing aids but usually does not wear them    Other seasonal allergic rhinitis     Last assessed: 2/10/16    PAD (peripheral artery disease) (HCC)     Shortness of breath     on exertion    Spinal stenosis of lumbar region     Transient cerebral ischemia     No Residual    Uses walker     w/c for longer distances     Past Surgical History:   Procedure Laterality Date    CARDIAC SURGERY      Cath stent placement  Last assessed: 3/9/17  Interventional Catheterization    CHOLECYSTECTOMY      COLONOSCOPY      CYSTOSCOPY      Diagnostic w/biopsy  Aimee Heredia  Last assessed: 12/1/14    CYSTOSCOPY W/ RETROGRADES Right 3/1/2022    Procedure: CYSTO; stent removal retrograde;  Surgeon: Sharon Pierce MD;  [facility];  Service: Urology    CYSTOSCOPY W/ URETERAL STENT PLACEMENT Bilateral 10/18/2021    Procedure: bilateral retrogrades, cytology collection;  Surgeon: Sharon Pierce MD;  [facility];  Service: Urology    CYSTOURETHROSCOPY      w/cautery  Aimee Heredia    FL RETROGRADE PYELOGRAM  10/18/2021    FL RETROGRADE PYELOGRAM  10/24/2021    GASTRIC BYPASS      For morbid obesity w/Shaji-en-Y   Resolved: 11/17/09    INCISION AND DRAINAGE OF WOUND Right 2/26/2017    Procedure: INCISION AND DRAINAGE (I&D) EXTREMITY WITH APPLICATION OF GRAFT JACKET;  Surgeon: Boston Ruiz DPM;  [facility];  Service:     INCISION AND DRAINAGE OF WOUND Right 4/25/2017    Procedure: INCISION AND DRAINAGE (I&D) EXTREMITY, APPLICATION OF GRAFT;  Surgeon: Boston Ruiz DPM;  [facility];  Service:     IR BIOPSY OTHER  7/2/2020    IR LOWER EXTREMITY ANGIOGRAM  2/8/2021    IR LOWER EXTREMITY ANGIOGRAM  2/11/2021    IR NEPHROSTOMY TUBE PLACEMENT  2/25/2022    IR PORT PLACEMENT  1/17/2022    IR TUNNELED CENTRAL LINE PLACEMENT  12/24/2020    JOINT REPLACEMENT      christofer knees replaced    NH AMPUTATION METATARSAL+TOE,SINGLE Left 12/21/2020    Procedure: RAY RESECTION FOOT;  Surgeon: Elsie Leiva DPM;  [facility];  Service: Podiatry    NH AMPUTATION METATARSAL+TOE,SINGLE Left 12/31/2020    Procedure: 5TH MET RESECTION;  Surgeon: Elsie Leiva DPM;  [facility];  Service: Podiatry    NH CYSTOURETHROSCOPY W/IRRIG & EVAC CLOTS N/A 2/10/2021    Procedure: CYSTOSCOPY EVACUATION OF CLOTS, fulguration;  Surgeon: Joann Bay MD;  [facility];  Service: Urology    NH CYSTOURETHROSCOPY W/IRRIG & EVAC CLOTS N/A 10/24/2021    Procedure: CYSTOSCOPY EVACUATION OF CLOT, fulguration of bleeding vessels, right ureter stent placement, retrograde pyelogram;  Surgeon: Vanda Lopes MD;  [facility];  Service: Urology    NH CYSTOURETHROSCOPY,BIOPSY N/A 8/16/2016    Procedure: CYSTOSCOPY WITH BIOPSIES;  Surgeon: Meredith Wise MD;  [facility];  Service: Urology    NH CYSTOURETHROSCOPY,FULGUR <0 5 CM LESN N/A 11/19/2020    Procedure: CYSTO W/BIOPSIES, transurethral prostate bx;  Surgeon: Michael Connor MD;  [facility];  Service: Urology    NH CYSTOURETHROSCOPY,FULGUR >5 CM LESN Bilateral 10/18/2021    Procedure: TRANSURETHRAL RESECTION OF BLADDER TUMOR (TURBT);   Surgeon: Lauren Becerra MD;  [facility];  Service: Urology    NH [address] [facility] 3RD+ ORD SLCTV ABDL PEL/[facility] Left 2/8/2021    Procedure: LEG angiogram, CO2 w/limited contrast with balloon angioplasty postertior tibial artery;  Surgeon: Charity Cooper MD;  [facility];  Service: Vascular    ROTATOR CUFF REPAIR      SMALL INTESTINE SURGERY      Surgery Shaji-en-Y    SPINAL FUSION      lumbar and cervical fusions    VAC DRESSING APPLICATION Right 4/19/8134    Procedure: APPLICATION VAC DRESSING;  Surgeon: Park Elaine DPM;  [facility];  Service:    Kaila Roxann DEBRIDEMENT Left 2/16/2021    Procedure: FOOT DEBRIDE, [address] OUT w/graft application;  Surgeon: Felicity Vásquez DPM;  [facility];  Service: Podiatry     Family History   Problem Relation Age of Onset    Diabetes Mother     Heart disease Mother     Other Mother         High blood pressure    Heart disease Father     Diabetes Sister     Other Sister         High blood pressure    Kidney disease Sister     Heart disease Brother     Other Brother         High blood pressure     Social History     Socioeconomic History    Marital status: /Civil Union     Spouse name: None    Number of children: None    Years of education: None    Highest education level: None   Occupational History    None   Tobacco Use    Smoking status: Former Smoker     Packs/day: 3 00     Years: 27 00     Pack years: 81 00     Types: Cigarettes     Quit date:      Years since quittin 2    Smokeless tobacco: Never Used   Vaping Use    Vaping Use: Never used   Substance and Sexual Activity    Alcohol use: Never     Comment: beer / liquor    Drug use: Not Currently     Types: Marijuana     Comment: quit 2019 had medical marijuana    Sexual activity: Not Currently   Other Topics Concern    None   Social History Narrative    Consumes 1 cup of coffee and 1 soda per day     Social Determinants of Health     Financial Resource Strain: Not on file   Food Insecurity: Not on file   Transportation Needs: Not on file   Physical Activity: Not on file   Stress: Not on file   Social Connections: Not on file   Intimate Partner Violence: Not on file   Housing Stability: Not on file         [facility] [place], [place]  Date: 3/2/2022 Time: 11:38 AM

## 2022-03-02 NOTE — DISCHARGE INSTRUCTIONS
Dear Fabrizio Barker,     It was our pleasure to care for you here at [facility]  It is our hope that we were always able to exceed the expected standards for your care during your stay  You were hospitalized due to GARY, urinary obstruction  You were cared for on the 2nd floor under the service of Tonie Hodges DO with the [facility] who covers for your primary care physician (PCP), Peyman Huggins MD, while you were hospitalized  If you have any questions or concerns related to this hospitalization, you may contact us at [phone]  For follow up as well as medication refills, we recommend that you follow up with your primary care physician  A registered nurse will reach out to you by phone within a few days after your discharge to answer any additional questions that you may have after going home  However, at this time we provide for you here, the most important instructions / recommendations at discharge:     · Notable Medication Adjustments -   · Ditropan XL in place of short-acting, peridium x3 days for bladder spasms  · New prescription for Percocet for breakthrough pain  · Testing Required after Discharge -   · No further testing at this time  · Important follow up information -   · Follow-up with Urology, IR, Nephrology  · Other Instructions -   · Instructions listed below for management of nephrostomy tubes  · Please review this entire after visit summary as additional general instructions including medication list, appointments, activity, diet, any pertinent wound care, and other additional recommendations from your care team that may be provided for you  Sincerely,     Tonie Hodges DO      Nephrostomy Tube Care     WHAT YOU NEED TO KNOW:   A nephrostomy tube is a catheter (thin plastic tube) that is inserted through your skin and into your kidney   The nephrostomy tube drains urine from your kidney into a collecting bag outside your body  You may need a nephrostomy tube when something is blocking the normal flow of urine  A nephrostomy tube may be used for a short or a long period of time  The nephrostomy tube comes out of your back, so you will need someone to help care for your nephrostomy tube  DISCHARGE INSTRUCTIONS:      How to clean the skin around the nephrostomy tube and change the bandage:  Since the nephrostomy tube comes out of your back, you will not be able to care for it by yourself  Ask someone to follow the general directions below to check and care for your nephrostomy tube  Gather the items you will need  Disposable (single use) under-pad, and a clean washcloth  ¨ Plain soap, warm water, and new medical gloves  ¨ Sterile gauze bandages  ¨ Clear adhesive dressing or medical tape  ¨ Skin barrier  ¨ Protective skin film  ¨ Trash bag  · Remove the old bandage, and check the tube entry site  ¨ Have the patient lie on his side with the nephrostomy tube entry site facing up  Place the under-pad where it will catch drainage as you are working with the nephrostomy tube  ¨ Wash your hands with soap and water  Put on new medical gloves  ¨ Gently remove the old bandage, without pulling on the tube  Do this by holding the skin beside the tube with one hand  With the other hand, gently remove sticky tape and the skin barrier by pulling in the same direction as hair growth  Do not touch the side of the bandage that is placed over or around the tube  Throw the bandage and skin barrier away in a trash bag  ¨ Look for signs of infection, such as skin redness and swelling  Report any skin changes to healthcare providers  ¨ Clean the tube entry site  ¨ Hold the tube in place to keep it from being pulled out while you are cleaning around it  ¨ You will need to clean the area twice  For the first cleaning, wet a new gauze bandage with soap and water  Begin at the entry site of the tube   Wipe the skin in circles, moving away from the entry site  Remove blood and any other material with the gauze  Do this as often as needed  Use a new gauze bandage each time you clean the area, moving away from the entry site  ¨ For the second cleaning, wet a new gauze bandage with water  Begin at the entry site of the tube  Wipe the skin in circles, moving away from the entry site  Use a new gauze bandage each time you clean the area, moving away from the entry site  ¨ Gently pat the skin with a clean washcloth to dry it  · Apply the skin barrier and bandages  ¨ Roll up a bandage to make it thick, and place it under  the place where the tube enters the skin  Place it to support the tube, and stop it from kinking or bending  Tape the bandage in place, and apply more bandages if directed by a healthcare provider  ¨ Bring the tubing forward to the front and tape it to the skin  Do not stretch the tube tight, because this may pull the nephrostomy tube out  How often to change the bandage  Change the bandage around the tube, every other day  If your bandages  get dirty or wet, change them right away, and as often as needed  If your nephrostomy tube is to be used for a long period of time, the tube needs to be changed every 2 to 3 months  Healthcare providers will tell you when you need to make an appointment to have your tube changed  How to care for the urine drainage bag:   · Ask if you need to measure and write down how much urine is in the bag before you empty it  Drain urine out of the drainage bag when it is ½ to ? full  Open the spout at the bottom of the bag to empty the urine into the toilet  · You may need to detach the drainage bag from the nephrostomy tube to change it    If so, attach a new drainage bag tightly to the nephrostomy tube  ·   How to prevent problems with your nephrostomy tube:   · Change bandages, directed  This helps to prevent infection   Throw away or clean your drainage bag as directed by your healthcare provider  · Wipe the connecting ends of the drainage bag with alcohol before you reconnect the bag to the tube  This helps prevent infection  Keep the tube taped to your skin and connected to a drainage bag placed below the level of your kidneys  This helps prevent urine from backing up into your kidneys  You may wear a small drainage bag strapped to your leg to let you move around more easily  · Check the catheter to be sure it is in place after you change your clothes or do other activities  Do not wear tight clothing over the tube  Place the tubing over your thigh rather than under it when you are sitting down  Be sure that nothing is pulling on the nephrostomy tube when you move around  · Change positions if you see little or no urine in your drainage bag  Check to see if the urine tube is twisted or bent  Be sure that you are not sitting or lying on the tube  If there are no kinks and there is little or no urine in the drainage bag, tell your healthcare provider  · Flush out the tube as directed  Some tubes get flushed one time a day with 10 mls of NSS You will be given a prescription for the flushes  To flush the nephrostomy tube, clean both connections with alcohol swap  Twist off the drainage bag tube and twist the saline syringe into the nephrostomy tube and flush briskly  Remove the syringe and twist the drainage bag tube back into the nephrostomy tube  · Keep the site covered while you shower  Tape a piece of clear adhesive plastic over the dressing to keep it dry while you shower  Do not take tub baths  Contact Interventional Radiology at [phone] [place] PATIENTS: Contact Interventional Radiology at [phone]) Dustin Montero PATIENTS: Contact Interventional Radiology at [phone]) if:  · The skin around the nephrostomy tube is red, swollen, itches, or has a rash  · You have a fever greater than 101 or chills  · You have lower back or hip pain    · There are changes in how your urine looks or smells  · You have little or no urine draining from the nephrostomy tube  · You have nausea and are vomiting  · The black michelle on your tube has moved, or the tube is longer than when it was put in    · You have questions or concerns about your condition or care  · The nephrostomy tube comes out completely  · There is blood, pus, or a bad smell coming from the place where the tube enters your skin  · Urine is leaking around the tube  The following pharmacies carry the flush syringes  [facility]       [address] [facility]                    [id number] [place] [address]  Phone [phone]            Phone [phone]  [address] & [facility]                      [address]                                 [phone]  Phone [phone]            Phone [phone]    [facility]                                                                         [facility] [phone]  [address]   Phone [phone]

## 2022-03-02 NOTE — PLAN OF CARE
Problem: PAIN - ADULT  Goal: Verbalizes/displays adequate comfort level or baseline comfort level  Description: Interventions:  - Encourage patient to monitor pain and request assistance  - Assess pain using appropriate pain scale  - Administer analgesics based on type and severity of pain and evaluate response  - Implement non-pharmacological measures as appropriate and evaluate response  - Consider cultural and social influences on pain and pain management  - Notify physician/advanced practitioner if interventions unsuccessful or patient reports new pain  Outcome: Progressing     Problem: SAFETY ADULT  Goal: Patient will remain free of falls  Description: INTERVENTIONS:  - Educate patient/family on patient safety including physical limitations  - Instruct patient to call for assistance with activity   - Consult OT/PT to assist with strengthening/mobility   - Keep Call bell within reach  - Keep bed low and locked with side rails adjusted as appropriate  - Keep care items and personal belongings within reach  - Initiate and maintain comfort rounds  - Make Fall Risk Sign visible to staff  - Initiate/Maintain bed and chair alarm  - Apply yellow socks and bracelet for high fall risk patients  - Consider moving patient to room near nurses station  Outcome: Progressing  Goal: Maintain or return to baseline ADL function  Description: INTERVENTIONS:  -  Assess patient's ability to carry out ADLs; assess patient's baseline for ADL function and identify physical deficits which impact ability to perform ADLs (bathing, care of mouth/teeth, toileting, grooming, dressing, etc )  - Assess/evaluate cause of self-care deficits   - Assess range of motion  - Assess patient's mobility; develop plan if impaired  - Assess patient's need for assistive devices and provide as appropriate  - Encourage maximum independence but intervene and supervise when necessary  - Involve family in performance of ADLs  - Assess for home care needs following discharge   - Consider OT consult to assist with ADL evaluation and planning for discharge  - Provide patient education as appropriate  Outcome: Progressing  Goal: Maintains/Returns to pre admission functional level  Description: INTERVENTIONS:  - Perform BMAT or MOVE assessment daily    - Set and communicate daily mobility goal to care team and patient/family/caregiver  - Collaborate with rehabilitation services on mobility goals if consulted  - Out of bed for meals 3   Problem: DISCHARGE PLANNING  Goal: Discharge to home or other facility with appropriate resources  Description: INTERVENTIONS:  - Identify barriers to discharge w/patient and caregiver  - Arrange for needed discharge resources and transportation as appropriate  - Identify discharge learning needs (meds, wound care, etc )  - Arrange for interpretive services to assist at discharge as needed  - Refer to Case Management Department for coordinating discharge planning if the patient needs post-hospital services based on physician/advanced practitioner order or complex needs related to functional status, cognitive ability, or social support system  Outcome: Progressing     Problem: Knowledge Deficit  Goal: Patient/family/caregiver demonstrates understanding of disease process, treatment plan, medications, and discharge instructions  Description: Complete learning assessment and assess knowledge base    Interventions:  - Provide teaching at level of understanding  - Provide teaching via preferred learning methods  Outcome: Progressing    times a day  - Out of bed for toileting  - Record patient progress and toleration of activity level   Outcome: Progressing

## 2022-03-03 NOTE — OP NOTE
OPERATIVE REPORT  PATIENT NAME: Alena De Leon    :  1945  MRN: [id number]  Pt Location: [facility] OR ROOM 02    SURGERY DATE: 3/1/2022    Surgeon(s) and Role:     * Fabiano Jenkins MD - Primary    Preop Diagnosis:  Bilateral hydronephrosis [N13 30]  Retained ureteral stent [Z96 0]    Post-Op Diagnosis Codes:     * Bilateral hydronephrosis [N13 30]     * Retained ureteral stent [Z96 0]    Procedure(s) (LRB):  CYSTO; stent removal retrograde (Right)    Specimen(s):  * No specimens in log *    Estimated Blood Loss:   Minimal    Drains:  Nephrostomy Right 1 10 2 Fr  (Active)   Site Assessment Clean; Intact 22 0848   Dressing Status Clean;Dry; Intact 22 0848   Dressing Type Split gauze 22 0848   Dressing Change Due 22 2300   Collection Container Leg bag 22 0848   Securement Method Securing device (Describe) 22 0848   Intake (mL) 10 mL 22 0848   Output (mL) 175 mL 22 1754   Number of days: 6       Nephrostomy Left 2 10 2 Fr  (Active)   Site Assessment Clean; Intact 22 0848   Dressing Status Clean;Dry; Intact 22 0848   Dressing Type Split gauze 22 0848   Dressing Change Due 22 2300   Collection Container Leg bag 22 0848   Securement Method Securing device (Describe) 22 0848   Intake (mL) 10 mL 22 0848   Output (mL) 150 mL 22 1754   Number of days: 6       Anesthesia Type:   General    Operative Indications:  Bilateral hydronephrosis [N13 30]  Retained ureteral stent [Z96 0]      Operative Findings:  Bladder very inflamed, cloudy with some blood in urine limiting visualization  No obvious tumor, but hard to really see  Stent removed without difficulty  Complications:   None    Procedure and Technique:  After the patient was placed under adequate general anesthesia, he was prepped and draped using chlorhexidine solution in lithotomy position    The 25 [place] scope was passed without difficulty in the urethra which had no stenosis  The prostate had significant hyperplasia, obstructing  The bladder was very inflamed, very cloudy urine with erythema and some necrosis on the bladder mucosa from prior tumor resection  I could not tell if there is active tumor or not  The stent was seen, grasped with the forceps, and removed without difficulty  No real bleeding was seen from the orifice at that time  The scope was removed, and patient was taken to recovery in good condition     I was present for the entire procedure    Patient Disposition:  PACU       SIGNATURE: Sumit Mccann MD  DATE: March 3, 2022  TIME: 4:36 PM

## 2022-03-03 NOTE — TELEPHONE ENCOUNTER
Lm for patient to see how he was doing Dr Seble Faust is canceling Cysto appointment in april - he will wait to evalute after Mike Carrasquillo has completed radiation

## 2022-03-04 ENCOUNTER — TRANSITIONAL CARE MANAGEMENT (OUTPATIENT)
Dept: INTERNAL MEDICINE CLINIC | Facility: CLINIC | Age: 77
End: 2022-03-04

## 2022-03-04 DIAGNOSIS — C67.9 BLADDER CARCINOMA (HCC): ICD-10-CM

## 2022-03-04 DIAGNOSIS — R39.9 UTI SYMPTOMS: Primary | ICD-10-CM

## 2022-03-04 RX ORDER — OXYCODONE HYDROCHLORIDE AND ACETAMINOPHEN 5; 325 MG/1; MG/1
1 TABLET ORAL EVERY 6 HOURS PRN
Qty: 10 TABLET | Refills: 0 | Status: CANCELLED | OUTPATIENT
Start: 2022-03-04 | End: 2022-03-14

## 2022-03-04 NOTE — TELEPHONE ENCOUNTER
Miguel Stern calling from [facility][note 3] wanted to know why the patient was requesting this medication from their office when this is a bladder issue and he sees Urology  It looks like the patient called into [facility] who answers for Urology and it was routed incorrectly  Miguel Stern did say that she spoke to the patient and that he is in so much pain that if he is unable to get a refill on the oxycodone that he is going back to the hospital  Please reach out to the patient asap

## 2022-03-04 NOTE — TELEPHONE ENCOUNTER
Patient s/p 2/25/22 b/l nephrostomy tube placement 3/1/22 cysto stent removal    Patient states most of pain is b/l back pain by nephrostomy tubes 8/10    Patient states RN was there today and did not state there was any issue with wife  Patient has tape allergy and can burning under the tape  No fever or chills reported  Nephrostomy drainage is okay  Patient states it is orange due to medication  Patients states he is voiding out of urethra as well  Burning with urination frequency and urgency  Patient has been taking oxycodone 325/5mg   This morning  Only has a few pill left  Contact patient wife for [facility] Phone number  Pharmacy confirmed as :  [facility] [address] [place] [address]    tt PA-C on call    "  Pain is expected after tube placement  Conservative management, however do recommend urine testing to assess for infection "  Returned call to patient advised that I did attempt twice to reach his wife for [facility] phone number  Left a message on her machine  He can have his wife go to the lab and get a specimen cup and drop off the urine to the lab  He should monitor for fever, chills change in urine out put  Contact our office after hours on call and report any changes  OTC and prescribed medication for pain management   Ed precautions reviewed  Patient verbalized understanding

## 2022-03-04 NOTE — TELEPHONE ENCOUNTER
Patient had recent procedure  Please ask patient where he is experiencing pain, or if he is having any lower urinary tract symptoms  May want to consider urine testing  Should triage symptoms

## 2022-03-07 ENCOUNTER — PATIENT OUTREACH (OUTPATIENT)
Dept: HEMATOLOGY ONCOLOGY | Facility: CLINIC | Age: 77
End: 2022-03-07

## 2022-03-07 ENCOUNTER — APPOINTMENT (OUTPATIENT)
Dept: LAB | Facility: HOSPITAL | Age: 77
End: 2022-03-07
Attending: UROLOGY
Payer: MEDICARE

## 2022-03-07 ENCOUNTER — TELEPHONE (OUTPATIENT)
Dept: OTHER | Facility: OTHER | Age: 77
End: 2022-03-07

## 2022-03-07 LAB
BACTERIA UR QL AUTO: ABNORMAL /HPF
BILIRUB UR QL STRIP: NEGATIVE
CLARITY UR: ABNORMAL
COLOR UR: ABNORMAL
GLUCOSE UR STRIP-MCNC: NEGATIVE MG/DL
HGB UR QL STRIP.AUTO: 250
KETONES UR STRIP-MCNC: NEGATIVE MG/DL
LEUKOCYTE ESTERASE UR QL STRIP: 500
NITRITE UR QL STRIP: NEGATIVE
NON-SQ EPI CELLS URNS QL MICRO: ABNORMAL /HPF
PH UR STRIP.AUTO: 6 [PH]
PROT UR STRIP-MCNC: >=500 MG/DL
RBC #/AREA URNS AUTO: ABNORMAL /HPF
SP GR UR STRIP.AUTO: 1.01 (ref 1–1.04)
UROBILINOGEN UA: NEGATIVE MG/DL
WBC #/AREA URNS AUTO: ABNORMAL /HPF

## 2022-03-07 PROCEDURE — 87086 URINE CULTURE/COLONY COUNT: CPT

## 2022-03-07 PROCEDURE — 81001 URINALYSIS AUTO W/SCOPE: CPT

## 2022-03-07 NOTE — TELEPHONE ENCOUNTER
Call placed to patient's granddaughter, who is listed on contact sheet and spoke with her  She is asking that her grandfather's dressing change be done every 3 days and not 2  Pt has poor compliance and does not heal well  I informed granddaughter that this request came from [facility]  She is aware and is asking if Dr Ary Cockayne can please advise on how often dressing should be changed  She would prefer patient to have dressing changed 3x a week and not 2  She is aslo asking if Dr Ary Cockayne can please call her with a plan for nephrostomy tubes and how long patient will have them  Informed Doris that I will send a message to Dr Ary Cockayne for review and recommendations

## 2022-03-07 NOTE — TELEPHONE ENCOUNTER
Home health is requesting a call back from the office  They would like to discuss if the patient can have his dressings changed only 2 times a week vs 3  They would like to change them on Mondays and Thursdays

## 2022-03-07 NOTE — TELEPHONE ENCOUNTER
Granddaughter called and is not happy that dressing is only getting changed 2 x times a week now  She would like to discuss this with the office

## 2022-03-07 NOTE — PROGRESS NOTES
Outreach to pt to follow-up on hospitalization to see how he is doing and feeling  Introduced myself and my role and that I am here to help facilitate his appts and connect him with any resources he may need  Advised that I scheduled him for an appt with Dr Elaina Garcia on Friday 3/11/22 at 8am to discuss plan of care since hospitalization  Message will be sent to Radiation Oncology to coordinate a follow-up appt also  Encouraged call back with any questions or concerns

## 2022-03-07 NOTE — TELEPHONE ENCOUNTER
Call placed to Lev Langford at [facility]  She did not answer  Asked her to contact the office to review the recommendations of the CRNP and get fax number where orders need to be sent  Office number was provided for Lev Langford to call back       Lev Langford @ [address] [place] [address]- [phone]

## 2022-03-08 ENCOUNTER — PATIENT OUTREACH (OUTPATIENT)
Dept: HEMATOLOGY ONCOLOGY | Facility: CLINIC | Age: 77
End: 2022-03-08

## 2022-03-08 ENCOUNTER — TELEPHONE (OUTPATIENT)
Dept: HEMATOLOGY ONCOLOGY | Facility: HOSPITAL | Age: 77
End: 2022-03-08

## 2022-03-08 ENCOUNTER — TELEPHONE (OUTPATIENT)
Dept: UROLOGY | Facility: MEDICAL CENTER | Age: 77
End: 2022-03-08

## 2022-03-08 DIAGNOSIS — C67.3 MALIGNANT NEOPLASM OF ANTERIOR WALL OF URINARY BLADDER (HCC): Primary | ICD-10-CM

## 2022-03-08 LAB — BACTERIA UR CULT: NORMAL

## 2022-03-08 NOTE — TELEPHONE ENCOUNTER
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, and the internal stent was not helping   These external nephrostomy tubes drain the kidneys better and will protect the kidney function  Message text      Call placed to patient's granddaughter and spoke with her  Relayed the information of Dr Tiago Doe to her and she understands  She is asking if Tiago Spaulding can still please give her a call whenever it is convient for him as she still has questions about grandfather's course of treatment and the plan  She is aware that Dr Morgan is in surgery all day today and may not receive a call back today  Call was placed to [facility] services to clarify dressing orders as well  [facility] is aware and asked if orders can be faxed to state that dressings can be changed 2x a week instead of 3x a week       Bruce Fax # [phone]

## 2022-03-08 NOTE — TELEPHONE ENCOUNTER
Called patient to let patient know his infusion appt  Patient said he was not sure if he will be getting chemo due to his kidneys  He has an appointment to discuss this

## 2022-03-08 NOTE — PROGRESS NOTES
Follow-up call to pt to confirm he received my message regarding visit with Dr Rebeca Mclean scheduled for 3/11/22  Pt confirmed appt and expressed wanting to wait to schedule any chemo until further discussion  Still having problems since d/c  Per pt having significant pain with no relief from tylenol  Constant penal and back pain but increases with urination radiating into the lower back  Unable to sleep due to pain and decreased eating  Pt very upset with not being able to get any intervention for pain  C/o bladder pressure  Has noted blood in B/L bags and via urethra, + blood clots from urethra  Pt states he is aggressively hydrating hoping that symptoms would improve  Denies fevers  States he continues with bladder spasms that contribute to the pain despite taking oxybutin as prescribed  Assured him I would reach out to urology for assistance

## 2022-03-09 ENCOUNTER — TELEPHONE (OUTPATIENT)
Dept: UROLOGY | Facility: MEDICAL CENTER | Age: 77
End: 2022-03-09

## 2022-03-09 ENCOUNTER — OFFICE VISIT (OUTPATIENT)
Dept: INTERNAL MEDICINE CLINIC | Facility: CLINIC | Age: 77
End: 2022-03-09
Payer: MEDICARE

## 2022-03-09 VITALS
RESPIRATION RATE: 14 BRPM | DIASTOLIC BLOOD PRESSURE: 86 MMHG | BODY MASS INDEX: 31.42 KG/M2 | TEMPERATURE: 98.6 F | SYSTOLIC BLOOD PRESSURE: 140 MMHG | WEIGHT: 232 LBS | HEIGHT: 72 IN | HEART RATE: 80 BPM

## 2022-03-09 DIAGNOSIS — F11.20 CONTINUOUS OPIOID DEPENDENCE (HCC): ICD-10-CM

## 2022-03-09 DIAGNOSIS — D69.6 THROMBOCYTOPENIA (HCC): ICD-10-CM

## 2022-03-09 DIAGNOSIS — L97.426 DIABETIC ULCER OF LEFT MIDFOOT ASSOCIATED WITH TYPE 2 DIABETES MELLITUS, WITH BONE INVOLVEMENT WITHOUT EVIDENCE OF NECROSIS (HCC): ICD-10-CM

## 2022-03-09 DIAGNOSIS — Z79.4 TYPE 2 DIABETES MELLITUS WITH STAGE 3 CHRONIC KIDNEY DISEASE, WITH LONG-TERM CURRENT USE OF INSULIN, UNSPECIFIED WHETHER STAGE 3A OR 3B CKD (HCC): Primary | ICD-10-CM

## 2022-03-09 DIAGNOSIS — N18.30 TYPE 2 DIABETES MELLITUS WITH STAGE 3 CHRONIC KIDNEY DISEASE, WITH LONG-TERM CURRENT USE OF INSULIN, UNSPECIFIED WHETHER STAGE 3A OR 3B CKD (HCC): Primary | ICD-10-CM

## 2022-03-09 DIAGNOSIS — N32.89 BLADDER SPASMS: Primary | ICD-10-CM

## 2022-03-09 DIAGNOSIS — E11.621 DIABETIC ULCER OF LEFT MIDFOOT ASSOCIATED WITH TYPE 2 DIABETES MELLITUS, WITH BONE INVOLVEMENT WITHOUT EVIDENCE OF NECROSIS (HCC): ICD-10-CM

## 2022-03-09 DIAGNOSIS — J42 CHRONIC BRONCHITIS, UNSPECIFIED CHRONIC BRONCHITIS TYPE (HCC): ICD-10-CM

## 2022-03-09 DIAGNOSIS — I70.209 ARTERIOSCLEROSIS OF ARTERY OF EXTREMITY (HCC): ICD-10-CM

## 2022-03-09 DIAGNOSIS — E11.22 TYPE 2 DIABETES MELLITUS WITH STAGE 3 CHRONIC KIDNEY DISEASE, WITH LONG-TERM CURRENT USE OF INSULIN, UNSPECIFIED WHETHER STAGE 3A OR 3B CKD (HCC): Primary | ICD-10-CM

## 2022-03-09 DIAGNOSIS — I45.9 STOKES-ADAMS SYNCOPE: ICD-10-CM

## 2022-03-09 DIAGNOSIS — I73.9 PAD (PERIPHERAL ARTERY DISEASE) (HCC): ICD-10-CM

## 2022-03-09 DIAGNOSIS — I74.3 EMBOLISM AND THROMBOSIS OF ARTERIES OF THE LOWER EXTREMITIES (HCC): ICD-10-CM

## 2022-03-09 PROCEDURE — 99495 TRANSJ CARE MGMT MOD F2F 14D: CPT | Performed by: INTERNAL MEDICINE

## 2022-03-09 RX ORDER — ATROPA BELLADONNA AND OPIUM 16.2; 3 MG/1; MG/1
30 SUPPOSITORY RECTAL EVERY 8 HOURS PRN
Qty: 15 SUPPOSITORY | Refills: 0 | Status: SHIPPED | OUTPATIENT
Start: 2022-03-09 | End: 2022-04-01

## 2022-03-09 NOTE — TELEPHONE ENCOUNTER
Vel Munguia RN           Progress Notes  Juliann Garcia, RN (Registered Nurse)     Follow-up call to pt to confirm he received my message regarding visit with Dr Gilma Reyes scheduled for 3/11/22  Pt confirmed appt and expressed wanting to wait to schedule any chemo until further discussion  Still having problems since d/c  Per pt having significant pain with no relief from tylenol  Constant penal and back pain but increases with urination radiating into the lower back  Unable to sleep due to pain and decreased eating  Pt very upset with not being able to get any intervention for pain  C/o bladder pressure  Has noted blood in B/L bags and via urethra, + blood clots from urethra  Pt states he is aggressively hydrating hoping that symptoms would improve  Denies fevers  States he continues with bladder spasms that contribute to the pain despite taking oxybutin as prescribed    Assured him I would reach out to urology for assistance

## 2022-03-09 NOTE — PROGRESS NOTES
Assessment/Plan:      Admitted Onofre William February 23rd to March 2nd    Acute renal failure superimposed on chronic renal failure  Obstructive uropathy  Status post bilateral percutaneous nephrostomy tubes placed on February 25th Shirley catheter placement  Renal function improved    Hyperkalemia treated with calcium gluconate and insulin and D5 and of you potassium improved to 4 4    Heather Arevalo going to the back from on March 1st was not wearing his socks and slipped on the floor hit his shoulder left elbow off the toilet paper dispenser left shoulder elbow is negative    Diabetes fairly well controlled hemoglobin A1c 7 7    Hypertension orthostasis documented by me here blood pressure here was 140/80 standing immediately was 120/80 asymptomatic    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 on October of 2021 previous BCG installation     Coronary artery disease no angina here euvolemic no evidence of heart failure    CT scan of the abdomen showed worsening of diffuse bladder wall thickening could be cystitis of progression of the tumor  Enterococcus faecalis on the culture was treated with 7 days of Levaquin    Intraoperative culture of the right nephrectomy grow Enterococcus I    Does have close follow-up with Urology    Here today he is doing much better he labs less pain he is ambulating with a cane in denies any chest palpitation shortness of breath or fever or chills    Is still has some tenderness and bladder spasms he is taking ditropan  And  Pyridium     Labs review    Results for orders placed or performed in visit on 03/04/22   Urine culture    Specimen: Urine   Result Value Ref Range    Urine Culture No Growth <1000 cfu/mL    Urinalysis with microscopic   Result Value Ref Range    Clarity, UA Slightly Cloudy (A) Clear, Other    Color, UA Catherine (A) Straw, Yellow, Pale Yellow    Specific [place], UA 1 015 1 003 - 1 040    pH, UA 6 0 4 5, 5 0, 5 5, 6 0, 6 5, 7 0, 7 5, 8 0    Glucose, UA Negative Negative mg/dl    Ketones, UA Negative Negative mg/dl    Blood,  0 (A) Negative    Protein, UA >=500 (A) Negative mg/dl    Nitrite, UA Negative Negative    Bilirubin, UA Negative Negative    Leukocytes,  0 (A) Negative    WBC, UA 10-20 (A) None Seen, 2-4, 5-60 /hpf    RBC, UA 20-30 (A) None Seen, 2-4 /hpf    Bacteria, UA Occasional None Seen, Occasional /hpf    Epithelial Cells Occasional None Seen, Occasional /hpf    UROBILINOGEN UA Negative 1 0, Negative mg/dL     *Note: Due to a large number of results and/or encounters for the requested time period, some results have not been displayed  A complete set of results can be found in Results Review  TCM Call (since 2/6/2022)     Date and time call was made  3/4/2022  3:03 PM    Patient was hospitialized at  [facility] [place] [place] [address]        Date of Admission  02/23/22    Date of discharge  03/02/22    Diagnosis  Acute kidney injury superimposed on CKD    Disposition  Home    Were the patients medications reviewed and updated  Yes      TCM Call (since 2/6/2022)     Should patient be enrolled in anticoag monitoring? No    Scheduled for follow up? Yes    Did you obtain your prescribed medications  Yes    Do you need help managing your prescriptions or medications  No    Is transportation to your appointment needed  No    I have advised the patient to call PCP with any new or worsening symptoms  Herminio Rodríguez MA          No problem-specific Assessment & Plan notes found for this encounter  Diagnoses and all orders for this visit:    Type 2 diabetes mellitus with stage 3 chronic kidney disease, with long-term current use of insulin, unspecified whether stage 3a or 3b CKD (HCC)  -     CBC and differential; Future  -     Comprehensive metabolic panel; Future  -     TSH, 3rd generation with Free T4 reflex; Future  -     Magnesium; Future  -     Hemoglobin A1C; Future  -     NT-BNP PRO;  Future    Arteriosclerosis of artery of extremity (HCC)  -     CBC and differential; Future  -     Comprehensive metabolic panel; Future  -     TSH, 3rd generation with Free T4 reflex; Future  -     Magnesium; Future    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  -     CBC and differential; Future  -     Comprehensive metabolic panel; Future  -     TSH, 3rd generation with Free T4 reflex; Future  -     Magnesium; Future    PAD (peripheral artery disease) (HCC)  -     CBC and differential; Future  -     Comprehensive metabolic panel; Future  -     TSH, 3rd generation with Free T4 reflex; Future  -     Magnesium; Future  -     Hemoglobin A1C; Future  -     NT-BNP PRO; Future    Diabetic ulcer of left midfoot associated with type 2 diabetes mellitus, 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)    Embolism and 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 (HCC)    Chronic bronchitis, unspecified chronic bronchitis type (HCC)    Continuous opioid dependence (HCC)    Thrombocytopenia (HCC)          Subjective:      Patient ID: Sagar Rolle is a 76 y o  male      Chief Complaint   Patient presents with    Transition of Care Management     D/C [facility] 3 2 22 for Acute kidney injury superimposed on CKD         Current Outpatient Medications:     Accu-Chek Softclix Lancets lancets, Test blood sugar 4 times daily, Disp: 200 each, Rfl: 0    acetaminophen (TYLENOL) 325 mg tablet, Take 650 mg by mouth every 6 (six) hours as needed for mild pain, Disp: , Rfl:     ALPRAZolam (XANAX) 0 25 mg tablet, At twice a day as needed for anxiety, Disp: 30 tablet, Rfl: 0    aspirin (ECOTRIN LOW STRENGTH) 81 mg EC tablet, Take 1 tablet (81 mg total) by mouth daily, Disp: , Rfl:     Azelastine HCl 0 15 % SOLN, Inhale 1 spray 2 (two) times a day, Disp: 30 mL, Rfl: 3    Bimatoprost (LUMIGAN OP), Apply 1 drop to eye daily at bedtime , Disp: , Rfl:     calcitriol (ROCALTROL) 0 25 mcg capsule, Take 1 capsule (0 25 mcg total) by mouth daily, Disp: 90 capsule, Rfl: 0    docusate sodium (COLACE) 100 mg capsule, Take 1 capsule (100 mg total) by mouth 2 (two) times a day as needed for constipation, Disp: 100 capsule, Rfl: 0    DULoxetine (CYMBALTA) 20 mg capsule, Take 1 capsule (20 mg total) by mouth daily, Disp: 90 capsule, Rfl: 0    ezetimibe (ZETIA) 10 mg tablet, Take 1 tablet (10 mg total) by mouth daily in the early morning, Disp: 90 tablet, Rfl: 0    Ferrous Sulfate Dried (Feosol) 200 (65 Fe) MG TABS, Take 65 mg by mouth daily, Disp: 90 tablet, Rfl: 0    fluocinonide (LIDEX) 0 05 % cream, Apply topically 2 (two) times a day (Patient taking differently: Apply topically as needed ), Disp: 30 g, Rfl: 0    fluticasone (FLONASE) 50 mcg/act nasal spray, 1 spray into each nostril daily (Patient taking differently: 1 spray into each nostril as needed ), Disp: 11 mL, Rfl: 1    furosemide (LASIX) 20 mg tablet, TAKE 1 TABLET BY MOUTH AS NEEDED FOR EDEMA, Disp: 90 tablet, Rfl: 1    gabapentin (Neurontin) 300 mg capsule, Take 1 capsule (300 mg total) by mouth daily at bedtime, Disp: 30 capsule, Rfl: 2    glucose blood (Accu-Chek Martha Plus) test strip, Test blood sugar 4 times daily, Disp: 200 strip, Rfl: 0    HYDROcodone-acetaminophen (NORCO) 5-325 mg per tablet, 1-2 tab every 6 hr if needed for pain, Disp: 30 tablet, Rfl: 0    Insulin Pen Needle 31G X 8 MM MISC, Inject 3 times a day, Disp: 100 each, Rfl: 2    isosorbide mononitrate (IMDUR) 30 mg 24 hr tablet, Take 1 tablet (30 mg total) by mouth daily in the early morning, Disp: 90 tablet, Rfl: 0    Lantus SoloStar 100 units/mL injection pen, 18 units qhs (Patient taking differently: Inject 18 Units under the skin daily at bedtime ), Disp: 30 mL, Rfl: 1    lidocaine (XYLOCAINE) 2 % topical gel, Apply topically as needed for mild pain, Disp: 30 mL, Rfl: 0    lidocaine-prilocaine (EMLA) cream, Apply topically as needed for mild pain, Disp: 30 g, Rfl: 0    loperamide (IMODIUM) 2 mg capsule, Take 2 mg by mouth 4 (four) times a day as needed for diarrhea, Disp: , Rfl:   multivitamin (THERAGRAN) TABS, Take 1 tablet by mouth daily  , Disp: , Rfl:     naloxone (NARCAN) 4 mg/0 1 mL nasal spray, Administer 1 spray into a nostril  If no response after 2-3 minutes, give another dose in the other nostril using a new spray , Disp: 1 each, Rfl: 1    NovoLOG FlexPen 100 units/mL injection pen, 10 units with each meal  (Patient taking differently: Inject 10 Units under the skin 3 (three) times a day with meals ), Disp: 5 pen, Rfl: 1    omeprazole (PriLOSEC) 20 mg delayed release capsule, Take 20 mg by mouth daily in the early morning  , Disp: , Rfl:     oxybutynin (DITROPAN-XL) 10 MG 24 hr tablet, Take 1 tablet (10 mg total) by mouth daily, Disp: 30 tablet, Rfl: 0    oxyCODONE-acetaminophen (Percocet) 5-325 mg per tablet, Take 1 tablet by mouth every 6 (six) hours as needed for moderate pain or severe pain for up to 10 days Max Daily Amount: 4 tablets, Disp: 10 tablet, Rfl: 0    sodium chloride, PF, 0 9 %, 10 mL by Intracatheter route daily Intracatheter flushing daily, Disp: 900 mL, Rfl: 0    sodium chloride, PF, 0 9 %, 10 mL by Intracatheter route daily Intracatheter flushing daily, Disp: 900 mL, Rfl: 0    tamsulosin (FLOMAX) 0 4 mg, Take 1 capsule (0 4 mg total) by mouth daily at bedtime, Disp: 90 capsule, Rfl: 0    metoprolol succinate (TOPROL-XL) 100 mg 24 hr tablet, Take 1 tablet (100 mg total) by mouth daily for 7 days, Disp: 90 tablet, Rfl: 0    sertraline (ZOLOFT) 50 mg tablet, Take 1 tablet (50 mg total) by mouth daily, Disp: 90 tablet, Rfl: 0    HPI    The following portions of the patient's history were reviewed and updated as appropriate: allergies, current medications, past family history, past medical history, past social history, past surgical history and problem list     Review of Systems   Constitutional: Negative  Negative for activity change, appetite change, fatigue, fever and unexpected weight change     HENT: Negative for congestion, ear pain, hearing loss, mouth sores, postnasal drip, rhinorrhea, sore throat, trouble swallowing and voice change  Eyes: Negative for pain, redness and visual disturbance  Respiratory: Negative for cough, chest tightness, shortness of breath and wheezing  Cardiovascular: Negative for chest pain, palpitations and leg swelling  Gastrointestinal: Negative for abdominal distention, abdominal pain, blood in stool, constipation, diarrhea and nausea  Endocrine: Negative for cold intolerance, heat intolerance, polydipsia, polyphagia and polyuria  Genitourinary: Negative for difficulty urinating, dysuria, flank pain, frequency, hematuria and urgency  Bladder spasms   Musculoskeletal: Negative for arthralgias, back pain, gait problem, joint swelling and myalgias  Skin: Negative for color change and pallor  Neurological: Negative for dizziness, tremors, seizures, syncope, weakness, numbness and headaches  Hematological: Negative for adenopathy  Does not bruise/bleed easily  Psychiatric/Behavioral: Negative  Negative for sleep disturbance  The patient is not nervous/anxious  Objective:    /86 (BP Location: Left arm, Patient Position: Sitting, Cuff Size: Standard)   Pulse 80   Temp 98 6 °F (37 °C)   Resp 14   Ht 6' (1 829 m)   Wt 105 kg (232 lb)   BMI 31 46 kg/m²      Physical Exam  Constitutional:       General: He is not in acute distress  Appearance: He is well-developed  He is obese  He is ill-appearing  He is not toxic-appearing or diaphoretic  HENT:      Head: Normocephalic  Right Ear: External ear normal       Left Ear: External ear normal       Nose: Nose normal       Mouth/Throat:      Pharynx: No oropharyngeal exudate  Eyes:      Conjunctiva/sclera: Conjunctivae normal       Pupils: Pupils are equal, round, and reactive to light  Neck:      Thyroid: No thyromegaly  Cardiovascular:      Rate and Rhythm: Normal rate and regular rhythm  Heart sounds: Normal heart sounds   No murmur heard  No friction rub  No gallop  Comments: S1-S2 regular rhythm  Extremities no edema  Pulmonary:      Effort: Pulmonary effort is normal  No respiratory distress  Breath sounds: Normal breath sounds  No wheezing or rales  Comments: Lungs are clear no wheezing rales or rhonchi  Abdominal:      General: Bowel sounds are normal  There is no distension  Palpations: Abdomen is soft  There is no mass  Tenderness: There is no abdominal tenderness  There is no guarding or rebound  Comments: Abdomen soft nontender some flank tenderness bilaterally and some for suprapubic tenderness  Musculoskeletal:         General: Normal range of motion  Cervical back: Normal range of motion and neck supple  Lymphadenopathy:      Cervical: No cervical adenopathy  Skin:     General: Skin is warm and dry  Neurological:      Mental Status: He is alert and oriented to person, place, and time     Psychiatric:         Behavior: Behavior normal          Judgment: Judgment normal

## 2022-03-09 NOTE — TELEPHONE ENCOUNTER
Duplicate encounter dated 3/9/21 sent high priority to \[facility]\"" providers

## 2022-03-09 NOTE — PATIENT INSTRUCTIONS
Follow-up with Urology  Your blood pressure is fine 140/80 when you stood up immediately was 120/80    Stand up slowly if you're dizzy and lightheaded sit down again and weight if you med

## 2022-03-10 NOTE — TELEPHONE ENCOUNTER
Call placed to patient and spoke with him  Informed him that prescription for medication was sent to his pharmacy on file  Pt is aware and reviewed with patient instructions  He had no further questions at this time

## 2022-03-11 ENCOUNTER — PATIENT OUTREACH (OUTPATIENT)
Dept: HEMATOLOGY ONCOLOGY | Facility: CLINIC | Age: 77
End: 2022-03-11

## 2022-03-11 ENCOUNTER — OFFICE VISIT (OUTPATIENT)
Dept: HEMATOLOGY ONCOLOGY | Facility: CLINIC | Age: 77
End: 2022-03-11
Payer: MEDICARE

## 2022-03-11 VITALS
DIASTOLIC BLOOD PRESSURE: 72 MMHG | OXYGEN SATURATION: 97 % | HEART RATE: 79 BPM | SYSTOLIC BLOOD PRESSURE: 142 MMHG | BODY MASS INDEX: 30.88 KG/M2 | HEIGHT: 73 IN | RESPIRATION RATE: 18 BRPM | TEMPERATURE: 98.4 F | WEIGHT: 233 LBS

## 2022-03-11 DIAGNOSIS — C67.3 MALIGNANT NEOPLASM OF ANTERIOR WALL OF URINARY BLADDER (HCC): Primary | ICD-10-CM

## 2022-03-11 PROCEDURE — 99214 OFFICE O/P EST MOD 30 MIN: CPT | Performed by: INTERNAL MEDICINE

## 2022-03-11 RX ORDER — HYDROMORPHONE HYDROCHLORIDE 2 MG/1
2 TABLET ORAL EVERY 6 HOURS PRN
Qty: 30 TABLET | Refills: 0 | Status: SHIPPED | OUTPATIENT
Start: 2022-03-11 | End: 2022-03-25

## 2022-03-11 NOTE — PROGRESS NOTES
Hematology/Oncology Outpatient Office Note    Date of Service: 3/11/2022    [facility] HEMATOLOGY ONCOLOGY SPECIALISTS CELSO Knox  [facility]  [phone]    Reason for Consultation:   Chief Complaint   Patient presents with    Follow-up       Cancer Stage at diagnosis: II    Referral Physician: No ref  provider found    Primary Care Physician:  Yvette Alas MD     Nickname: Tim Galvan    Spouse: Elías Bowers    Granddaughter: Kerline Borja     Original ECO    Today's ECO    Goals and Barriers:  Current Goal: Minimize effects of disease burden, extend life  Barriers to accomplishing this: fatigue, depression, anxiety, worry, L foot claudication, lower back pain from spinal stenosis and uses a cane a lot of the time    Patient's Capacity to Self Care:  Patient is able to self care    Code Status: Full code    Advanced directives: not on file but I recommended he get that done    ASSESSMENT & PLAN     No diagnosis found  This is a 76 y o  c PMHx notable for spinal stenosis, Gastric bypass (), 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, diabetic nephropathy, and renal vascular disease, DM, being seen in consultation for bladder cancer       From an overall performance status basis, I believe Manuel Rodgers can tolerate systemic treatment due to an ECOG PS of 2  A shared decision making approach was employed during today's visit  Thoughts on approach to systemic therapy in the first line and subsequent lines of therapy:    My favored first line is cisplatin/ gemcitabine however the patient's overall performance status and kidney function is not amenable to this      Thoughts on prediction for Grade III-V toxicity in elderly patients to systemic chemotherapy:  Age >72 years   GI/ cancers  Falls in last 6 months   Hearing impairment (b/l hearing aids)  Limited ability to walk 1 block  Requires assistance with medications  Decreased social activities   Altagracia Ramirez JCO 2011)  BMI<18 5 or moderate or severe weight loss or decrease in food intake in past 3 months  Mild vs moderate dementia/depression     The above 5 factors predict toxicity for the patient if he undergoes systemic chemotherapy and I discussed this with the patient  For unresectable cases: ICI can be employed if there is POD after definitive CRT    Discussion of disease response monitoring: We discussed with the patient at length the role of imaging and potential blood monitoring of burden of disease  We will opt to get CT chest, abdomen, pelvis without contrast due to kidney function as surveillance following conclusion of therapy  Should there be progression of disease, our plan is to employ repeat biopsy(ies) to assess actionable biomarkers and determine updated bio-marker status  Scenario for definitive CRT (non-surgical candidate): My plan is to incorporate 5-FU (During days 1-5 and 16-20 of RT) + Mitomycin (day 1 of each cycle)  This is based on:    Multicenter, phase III study where the regimen consisted of fluorouracil (500 mg per square meter of body-surface area per day) during fractions 1 to 5 and 16 to 20 of radiotherapy and mitomycin C (12 mg per square meter) on day 1  Patients were also randomly assigned to undergo either whole-bladder radiotherapy or modified-volume radiotherapy (in which the volume of bladder receiving full-dose radiotherapy was reduced)  2 year locoregional disease-free survival were 67% (95% confidence interval [CI], 59 to 74) in the chemoradiotherapy group and 54% (95% CI, 46 to 62) in the radiotherapy group  Five-year rates of overall survival were 48% (95% CI, 40 to 55) in the chemoradiotherapy group and 35% (95% CI, 28 to 43) in the radiotherapy group (hazard ratio, 0 82; 95% CI, 0 63 to 1 09; P=0 16)   Grade 3 or 4 adverse events were slightly more common in the chemoradiotherapy group than in the radiotherapy group during treatment (36 0% vs  27 5%, P=0 07) but not during follow-up (8 3% vs  15 7%, P=0 07)  Possible side effect such as renal insufficiency, nausea, vomiting, dehydration, allergic reaction, neuropathy, hearing loss, thrombocytopenia, anemia, infection, and death conveyed to the patient  The patient is advised to stay hydrated by consuming a lot of water and electrolyte-rich fluids (>2L) throughout the day  1/27/22 pt only got 1/2 of Days 1-5 5-FU due to contents spilling  Discussion of decision making   Oncology history updated, accordingly, during this visit   Goals of care/patient communication  o I discussed with the patient the clinical course leading up to their cancer diagnosis  I reviewed relevant office notes, imaging reports and pathology result as well   o I told the patient that this is a case of potentially curable disease and what this means  We discussed that the goal of anti-cancer therapy is to provide best quality of life, extend overall survival, and progression free survival as shown in clinical trials  We also discussed that there might be a point when the cancer will no longer respond to this anti-neoplastic therapy    o I explained the risks/benefits of the proposed cancer therapy: 5-FU+Mitomycin + RT and after discussion including understanding risks of possible life-threatening complications and therapy-related malignancy development, informed consent for treatment has been signed   TNM/Staging At Diagnosis  Cancer Staging  Malignant neoplasm of anterior wall of urinary bladder ([facility] [address] )  Staging form: Urinary Bladder, AJCC 8th Edition  - Clinical stage from 10/18/2021: Stage II (cT2, cN0, cM0) - Signed by William Carrillo MD on 11/17/2021  Stage prefix: Initial diagnosis  o    Disease Features/Tumor Markers/Genetics  o Tumor Marker:   o Notable Path Features: anterior wall Invasive high-grade urothelial carcinoma   Carcinoma invades muscularis propria (detrusor muscle)    Treatment: 5-FU + MMT (Doing MTC dose reduction 75% due to creat clearance 19 1)   Other Supportive care:    Treatment Team Members  o Surgeon: Dr Esvin Foster: Dr Ho Mack  o Palliative: referral made   Labs: creat 2 81 (eGFR 24), normocytic anemia, Hgb 9 8   Diagnostics: 11/11/21 CT CAP w/o c: 1  No metastatic disease within the limitations of noncontrast technique in the chest abdomen or pelvis  2   Diffuse hemicircumferential smooth bladder wall thickening on the left, similar to the prior study when allowing for differences in bladder distention  Prostatomegaly    Port is without acute issues  Discussion of decision making    I personally communicated with Dr Ho Mack on this case and have reviewed the following lab results, the image studies, pathology, other specialty/physicians consult notes and recommendations, and outside medical records from [facility]  I had a lengthy discussion with the patient and shared the work-up findings  We discussed the diagnosis and management plan as below  I spent 33 minutes reviewing the records (labs, clinician notes, outside records, medical history, ordering medicine/tests/procedures, interpreting the imaging/labs previously done) and coordination of care as well as direct time with the patient today, of which greater than 50% of the time was spent in counseling and coordination of care with the patient/family  · Plan/Labs  · Continue to assess for signs of distress as he has anxiety/depression worsened from baseline  Palliative care referral due to worsening cancer associated pain  I am writing for Dilaudid 2mg every 8 hours as needed to hold him over until he sees palliative Care  I am using this medication due to his poor kidney function  · F/u Rad Onc and RT scheduled (20 fractions planned),  4 remaining RT fractions, starting Monday 3/14-3/17    Message sent to them to consider pushing it back a week so that we can coordinate it with chemotherapy  · Push back restaging CT CAP 4/25/22 to end of May without contrast (falling within 2-3 months after completion of cancer treatment as per NCCN guidelines)  · F/u Urology with Dr Geo Wu (msg sent to  navigators to assist scheduling)  · Infusion center appointments and lab appointments for 5-FU + MMT in place to coincide with RT  Plan to complete treatment        Follow Up: 3 weeks, then after Restaging CT CAP thereafter    All questions were answered to the patient's satisfaction during this encounter  The patient knows the contact information for our office and knows to reach out for any relevant concerns related to this encounter  They are to call for any temperature 100 4 or higher, new symptoms including but not restricted to shaking chills, decreased appetite, nausea, vomiting, diarrhea, increased fatigue, shortness of breath or chest pain, confusion, and not feeling the strength to come to the clinic  For all other listed problems and medical diagnosis in their chart - they are managed by PCP and/or other specialists, which the patient acknowledges  Thank you very much for your consultation and making us a part of this patient's care  We are continuing to follow closely with you  Please do not hesitate to reach out to me with any additional questions or concerns  Carol Garcia MD  Hematology & Medical Oncology Staff Physician             Disclaimer: This document was prepared using [employer] Direct technology  If a word or phrase is confusing, or does not make sense, this is likely due to recognition error which was not discovered during this clinician's review  If you believe an error has occurred, please contact me through [address] line for godfrey? 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)    Initial Diagnosis    Malignant neoplasm of anterior wall of urinary bladder ([facility] [address] )     1994 Surgery    TURBT      1994 - 1995 Chemotherapy    Induction intravesical BCG x 2      8/17/2016 Surgery    TURBT      12/13/2016 Surgery    TURBT  [facility])    Bladder, left posterior wall, biopsy: High-grade urothelial carcinoma in-situ  Muscularis propria is identified with no tumor seen  Bladder, trigone, biopsy: Non-invasive high-grade papillary urothelial carcinoma, and flat urothelial carcinoma in-situ  Muscularis propria is not identified  1/4/2017 - 2/8/2017 Chemotherapy    Induction intravesical BCG     6/19/2017 Surgery    TURBT  [facility])    - Posterior wall, biopsy: Mild chronic cystitis with focal urothelial atypia  Muscularis propria is identified      - Right lateral wall, biopsy: Granulomatous cystitis  Muscularis propria is identified  - Left lateral wall, biopsy: Non invasive high-grade urothelial carcinoma  Muscularis propria is not identified  4/26/2019 Surgery    TURBT   [facility])     - bladder, right posterior wall, biopsy: Urothelial carcinoma in situ   - bladder, right lateral wall, biopsy: Small focus of urothelial carcinoma in situ  - bladder, left posterior wall, biopsy: Urothelial carcinoma in situ   - bladder, left lateral wall, biopsy: Urothelial carcinoma in situ     - bladder, trigone, biopsy: Invasive high-grade urothelial carcinoma, invading into lamina propria  No lymphovascular invasion seen  Muscularis propria not present  Separate piece showing urothelial carcinoma in situ        7/2019 -  Chemotherapy    Induction intravesical BCG x 4      11/4/2019 Surgery    TURBT  [facility])    - Superficial bladder tumor, transurethral resection: Non-invasive high grade urothelial carcinoma, with urothelial carcinoma in situ  Muscularis propria is not identified      - Bladder tumor, transurethral resection: Non-invasive high grade urothelial carcinoma, with urothelial carcinoma in situ, see note  Muscularis propria is present and free of tumor          12/9/2019 - 1/28/2020 Chemotherapy    Induction intravesical BCG+INF        6/9/2020 - 6/23/2020 Chemotherapy    Maintenance intravesical BCG+INF        11/19/2020 Biopsy    TURBT ([facility], Dr Tye Montoya)    A  Urinary Bladder, Left Posterior Bladder Wall:  -Extensively- denuded urothelial lined mucosa with mild chronic inflammation, vascular congestion  And edema   -Unremarkable small fragment of detrusor muscle present  -No evidence of invasive urothelial carcinoma seen     B  Urinary Bladder, Left lateral bladder Wall:  -Partially-denuded urothelial lined mucosa with mild  chronic inflammation  -Unremarkable small fragment of detrusor muscle present  -No evidence of invasive urothelial carcinoma seen     C  Urinary Bladder, Right Posterior Bladder wall:  -Urothelial lined mucosa with mild  chronic inflammation Von Brunn nests and mild urothelial atypia, possibly due to previous BCG administration   -Unremarkable small fragment of detrusor muscle present  -No evidence of invasive urothelial carcinoma seen     D  Urinary Bladder, Right Lateral Bladder Wall:  - Urothelial lined mucosa with mild  chronic inflammation   -Muscularis propria/ detrusor muscle is not present for evaluation    -No evidence of invasive urothelial carcinoma seen  E  Prostate, Prostate Tissue:  -Urothelial lined mucosa with mild chronic inflammation, prominent Von Brunn nests and Cystitis cystica   -Unremarkable small fragment of detrusor muscle present   -No evidence of malignancy seen  10/18/2021 -  Cancer Staged    Staging form: Urinary Bladder, AJCC 8th Edition  - Clinical stage from 10/18/2021: Stage II (cT2, cN0, cM0) - Signed by Amador Mcguire MD on 11/17/2021  Stage prefix: Initial diagnosis       10/18/2021 Surgery    TURBT ([facility])    A  Left posterior wall, bladder (transurethral resection):     - Urothelial tissue with small atypical cauterized focus favored to represent superficially invasive high-grade urothelial carcinoma        - Muscularis propria (detrusor muscle) present, and negative for carcinoma  - Marked edema and mucosal denudation with thermal artifact noted  B  Anterior wall, bladder (transurethral resection):      - Invasive high-grade urothelial carcinoma  - Carcinoma invades muscularis propria (detrusor muscle)  C   Left lateral wall, bladder (transurethral resection):     - Urothelial tissue with small atypical focus with marked thermal artifact; cannot exclude superficial carcinoma  - Muscularis propria (detrusor muscle) present, and negative for carcinoma  - Marked edema and mucosal denudation with thermal artifact noted     1/24/2022 -  Chemotherapy    mitoMYcin (MUTAMYCIN), 12 mg/m2 = 28 7 mg (100 % of original dose 12 mg/m2), Intravenous, Once, 1 of 1 cycle  Dose modification: 12 mg/m2 (original dose 12 mg/m2, Cycle 1), 3 mg/m2 (original dose 12 mg/m2, Cycle 1)  Administration: 7 2 mg (1/24/2022)  fluorouracil (ADRUCIL) ambulatory infusion Soln, 500 mg/m2/day = 4,780 mg (50 % of original dose 1,000 mg/m2/day), Intravenous, Over 96 hours, 1 of 1 cycle, Start date: 1/18/2022, End date: 1/23/2022  Dose modification: 500 mg/m2/day (original dose 1,000 mg/m2/day, Cycle 1, Reason: Dose modified as per discussion with consulting physician)       He had been getting BCG vaccine for his bladder since 1994        SUBJECTIVE  (INTERVAL HISTORY)      Clotting History None   Bleeding History No major events   Cancer History Bladder   Family Cancer History None   H/O Blood/Plt Transfusion None   Tobacco/etoh/drug abuse 1 5 PPD x 17 years (quit 1970), no other abuse   Hx COVID19 Infection and Vaccine Status Pfizer x 3 (had booster 9/2021)   Cancer Screening history n/a   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)   New medications in the last month: PO iron daily (recommended MWF)  Pain: dysuria (since 10/2021 bladder procedure), LBP, L foot pain s/p remote toe amputation     His maximum weight prior to the gastric bypass that he had was 330lbs  I have reviewed the relevant past medical, surgical, social and family history  I have also reviewed allergies and medications for this patient  Interval events:  He has had several days of significant pain in his urethra which occur intermittently throughout the day and is associated with sharp and burning pain  He otherwise is not having poor energy or loss of strength  Appetite is preserved  Review of Systems  Denies unintentional weight loss, F/C, N/V, CP, diarrhea, constipation, rash, itching, melena, hematuria, hematochezia  Chronic mild SOB with exertion, intermittent anxiety and depression that has been worsening since his cancer diagnosis  He has had fatigue since 2/2021 (his discharge from the hospital for LLE toe amputation)  No new issues although he has been dealing with his chronic back pain over the past year  A 10-point review of system was performed, pertinent positive and negative were detailed as above  Otherwise, the 10-point review of system was negative        Past Medical History:   Diagnosis Date    Anemia     Last assessed: 9/28/17    Anxiety     Arteriosclerotic cardiovascular disease     Last assessed: 9/28/17    Arthritis     Bladder cancer ([facility] [address] )     bladder- had BCG treatments    Chronic kidney disease     Stage IV    CKD (chronic kidney disease) stage 4, GFR 15-29 ml/min ([facility])     Colon polyp     Coronary artery disease     7 stents    Depression     Diabetes mellitus ([facility])     IDDM    GERD (gastroesophageal reflux disease)     Glaucoma     Hematuria     History of fusion of cervical spine     Hyperlipidemia     Hypertension     Insomnia     Last assessed: 11/14/12    Loss of hearing     has hearing aids but usually does not wear them    Other seasonal allergic rhinitis     Last assessed: 2/10/16    PAD (peripheral artery disease) ([facility])     Shortness of breath     on exertion    Spinal stenosis of lumbar region     Transient cerebral ischemia     No Residual    Uses walker     w/c for longer distances       Past Surgical History:   Procedure Laterality Date    CARDIAC SURGERY      Cath stent placement  Last assessed: 3/9/17  Interventional Catheterization    CHOLECYSTECTOMY      COLONOSCOPY      CYSTOSCOPY      Diagnostic w/biopsy  Jennifer Rosales  Last assessed: 12/1/14    CYSTOSCOPY W/ RETROGRADES Right 3/1/2022    Procedure: CYSTO; stent removal retrograde;  Surgeon: Gali Merritt MD;  [facility];  Service: Urology    CYSTOSCOPY W/ URETERAL STENT PLACEMENT Bilateral 10/18/2021    Procedure: bilateral retrogrades, cytology collection;  Surgeon: Gali Merritt MD;  [facility];  Service: Urology    CYSTOURETHROSCOPY      w/cautery  Jennifer Rosales    FL RETROGRADE PYELOGRAM  10/18/2021    FL RETROGRADE PYELOGRAM  10/24/2021    GASTRIC BYPASS      For morbid obesity w/Shaji-en-Y   Resolved: 11/17/09    INCISION AND DRAINAGE OF WOUND Right 2/26/2017    Procedure: INCISION AND DRAINAGE (I&D) EXTREMITY WITH APPLICATION OF GRAFT JACKET;  Surgeon: Dianne Farias DPM;  [facility];  Service:     INCISION AND DRAINAGE OF WOUND Right 4/25/2017    Procedure: INCISION AND DRAINAGE (I&D) EXTREMITY, APPLICATION OF GRAFT;  Surgeon: Dianne Farias DPM;  [facility];  Service:     IR BIOPSY OTHER  7/2/2020    IR LOWER EXTREMITY ANGIOGRAM  2/8/2021    IR LOWER EXTREMITY ANGIOGRAM  2/11/2021    IR NEPHROSTOMY TUBE PLACEMENT  2/25/2022    IR PORT PLACEMENT  1/17/2022    IR TUNNELED CENTRAL LINE PLACEMENT  12/24/2020    JOINT REPLACEMENT      christofer knees replaced    TN AMPUTATION METATARSAL+TOE,SINGLE Left 12/21/2020    Procedure: RAY RESECTION FOOT;  Surgeon: Elisabeth Liao DPM;  [facility];  Service: Podiatry    TN AMPUTATION METATARSAL+TOE,SINGLE Left 12/31/2020    Procedure: 5TH MET RESECTION;  Surgeon: Elisabeth Liao DPM;  [facility];  Service: Podiatry    GA CYSTOURETHROSCOPY W/IRRIG & EVAC CLOTS N/A 2/10/2021    Procedure: CYSTOSCOPY EVACUATION OF CLOTS, fulguration;  Surgeon: Sundeep Dewitt MD;  [facility];  Service: Urology    GA CYSTOURETHROSCOPY W/IRRIG & EVAC CLOTS N/A 10/24/2021    Procedure: CYSTOSCOPY EVACUATION OF CLOT, fulguration of bleeding vessels, right ureter stent placement, retrograde pyelogram;  Surgeon: Maggie Sullivan MD;  [facility];  Service: Urology    GA CYSTOURETHROSCOPY,BIOPSY N/A 8/16/2016    Procedure: CYSTOSCOPY WITH BIOPSIES;  Surgeon: Oliverio Bui MD;  [facility];  Service: Urology    GA CYSTOURETHROSCOPY,FULGUR <0 5 CM LESN N/A 11/19/2020    Procedure: CYSTO W/BIOPSIES, transurethral prostate bx;  Surgeon: Climmie Lombard, MD;  [facility];  Service: Urology    GA CYSTOURETHROSCOPY,FULGUR >5 CM LESN Bilateral 10/18/2021    Procedure: TRANSURETHRAL RESECTION OF BLADDER TUMOR (TURBT);   Surgeon: Joe Haynes MD;  [facility];  Service: Urology    GA [facility] 3RD+ ORD SLCTV ABDL PEL/[facility] Left 2/8/2021    Procedure: LEG angiogram, CO2 w/limited contrast with balloon angioplasty postertior tibial artery;  Surgeon: Laine Goddard MD;  [facility];  Service: Vascular    ROTATOR CUFF REPAIR      SMALL INTESTINE SURGERY      Surgery Shaji-en-Y    SPINAL FUSION      lumbar and cervical fusions    VAC DRESSING APPLICATION Right 3/67/9366    Procedure: APPLICATION VAC DRESSING;  Surgeon: Felicity Vásquez DPM;  [facility];  Service:     WOUND DEBRIDEMENT Left 2/16/2021    Procedure: FOOT DEBRIDE, [address] OUT w/graft application;  Surgeon: Felicity Vásquez DPM;  [facility];  Service: Podiatry       Family History   Problem Relation Age of Onset    Diabetes Mother     Heart disease Mother     Other Mother         High blood pressure    Heart disease Father     Diabetes Sister     Other Sister         High blood pressure    Kidney disease Sister     Heart disease Brother     Other Brother         High blood pressure       Social History     Socioeconomic History    Marital status: /Civil Union     Spouse name: Not on file    Number of children: Not on file    Years of education: Not on file    Highest education level: Not on file   Occupational History    Not on file   Tobacco Use    Smoking status: Former Smoker     Packs/day: 3 00     Years: 27 00     Pack years: 81 00     Types: Cigarettes     Quit date: 1970     Years since quittin 2    Smokeless tobacco: Never Used   Vaping Use    Vaping Use: Never used   Substance and Sexual Activity    Alcohol use: Never     Comment: beer / liquor    Drug use: Not Currently     Types: Marijuana     Comment: quit 2019 had medical marijuana    Sexual activity: Not Currently   Other Topics Concern    Not on file   Social History Narrative    Consumes 1 cup of coffee and 1 soda per day     Social Determinants of Health     Financial Resource Strain: Not on file   Food Insecurity: Not on file   Transportation Needs: Not on file   Physical Activity: Not on file   Stress: Not on file   Social Connections: Not on file   Intimate Partner Violence: Not on file   Housing Stability: Not on file       Allergies   Allergen Reactions    Atorvastatin Hives, Itching and Rash    Simvastatin Rash and Edema     Edema of lower legs    Statins Hives and Itching    Insulin Lispro Swelling and Edema     " Lower Legs"    Other Itching, Rash and Other (See Comments)     "EKG Patches"   "blue EKG patches"       Current Outpatient Medications   Medication Sig Dispense Refill    Accu-Chek Softclix Lancets lancets Test blood sugar 4 times daily 200 each 0    acetaminophen (TYLENOL) 325 mg tablet Take 650 mg by mouth every 6 (six) hours as needed for mild pain      ALPRAZolam (XANAX) 0 25 mg tablet At twice a day as needed for anxiety 30 tablet 0    aspirin (ECOTRIN LOW STRENGTH) 81 mg EC tablet Take 1 tablet (81 mg total) by mouth daily      Azelastine HCl 0 15 % SOLN Inhale 1 spray 2 (two) times a day 30 mL 3    belladonna-opium (B&O SUPPOSITORY) 16 2-30 mg suppository Insert 1 suppository into the rectum every 8 (eight) hours as needed for bladder spasms Max Daily Amount: 3 suppositories 15 suppository 0    Bimatoprost (LUMIGAN OP) Apply 1 drop to eye daily at bedtime       calcitriol (ROCALTROL) 0 25 mcg capsule Take 1 capsule (0 25 mcg total) by mouth daily 90 capsule 0    docusate sodium (COLACE) 100 mg capsule Take 1 capsule (100 mg total) by mouth 2 (two) times a day as needed for constipation 100 capsule 0    DULoxetine (CYMBALTA) 20 mg capsule Take 1 capsule (20 mg total) by mouth daily 90 capsule 0    ezetimibe (ZETIA) 10 mg tablet Take 1 tablet (10 mg total) by mouth daily in the early morning 90 tablet 0    Ferrous Sulfate Dried (Feosol) 200 (65 Fe) MG TABS Take 65 mg by mouth daily 90 tablet 0    fluocinonide (LIDEX) 0 05 % cream Apply topically 2 (two) times a day (Patient taking differently: Apply topically as needed ) 30 g 0    fluticasone (FLONASE) 50 mcg/act nasal spray 1 spray into each nostril daily (Patient taking differently: 1 spray into each nostril as needed ) 11 mL 1    furosemide (LASIX) 20 mg tablet TAKE 1 TABLET BY MOUTH AS NEEDED FOR EDEMA 90 tablet 1    gabapentin (Neurontin) 300 mg capsule Take 1 capsule (300 mg total) by mouth daily at bedtime 30 capsule 2    glucose blood (Accu-Chek Martha Plus) test strip Test blood sugar 4 times daily 200 strip 0    HYDROcodone-acetaminophen (NORCO) 5-325 mg per tablet 1-2 tab every 6 hr if needed for pain 30 tablet 0    Insulin Pen Needle 31G X 8 MM MISC Inject 3 times a day 100 each 2    isosorbide mononitrate (IMDUR) 30 mg 24 hr tablet Take 1 tablet (30 mg total) by mouth daily in the early morning 90 tablet 0    Lantus SoloStar 100 units/mL injection pen 18 units qhs (Patient taking differently: Inject 18 Units under the skin daily at bedtime ) 30 mL 1    lidocaine (XYLOCAINE) 2 % topical gel Apply topically as needed for mild pain 30 mL 0    lidocaine-prilocaine (EMLA) cream Apply topically as needed for mild pain 30 g 0    loperamide (IMODIUM) 2 mg capsule Take 2 mg by mouth 4 (four) times a day as needed for diarrhea      multivitamin (THERAGRAN) TABS Take 1 tablet by mouth daily   naloxone (NARCAN) 4 mg/0 1 mL nasal spray Administer 1 spray into a nostril  If no response after 2-3 minutes, give another dose in the other nostril using a new spray  1 each 1    NovoLOG FlexPen 100 units/mL injection pen 10 units with each meal  (Patient taking differently: Inject 10 Units under the skin 3 (three) times a day with meals ) 5 pen 1    omeprazole (PriLOSEC) 20 mg delayed release capsule Take 20 mg by mouth daily in the early morning        oxybutynin (DITROPAN-XL) 10 MG 24 hr tablet Take 1 tablet (10 mg total) by mouth daily 30 tablet 0    oxyCODONE-acetaminophen (Percocet) 5-325 mg per tablet Take 1 tablet by mouth every 6 (six) hours as needed for moderate pain or severe pain for up to 10 days Max Daily Amount: 4 tablets 10 tablet 0    sodium chloride, PF, 0 9 % 10 mL by Intracatheter route daily Intracatheter flushing daily 900 mL 0    sodium chloride, PF, 0 9 % 10 mL by Intracatheter route daily Intracatheter flushing daily 900 mL 0    tamsulosin (FLOMAX) 0 4 mg Take 1 capsule (0 4 mg total) by mouth daily at bedtime 90 capsule 0    metoprolol succinate (TOPROL-XL) 100 mg 24 hr tablet Take 1 tablet (100 mg total) by mouth daily for 7 days 90 tablet 0    sertraline (ZOLOFT) 50 mg tablet Take 1 tablet (50 mg total) by mouth daily 90 tablet 0     No current facility-administered medications for this visit         (Not in a hospital admission)      Objective:     24 Hour Vitals Assessment:     Vitals:    03/11/22 0819   BP: 142/72   Pulse: 79   Resp: 18   Temp: 98 4 °F (36 9 °C) SpO2: 97%       PHYSICIAN EXAM:    General: Appearance: alert, cooperative, in visible distress from pain in his urethra  HEENT: Normocephalic, atraumatic  No scleral icterus  conjunctivae clear  EOMI  Chest: No tenderness to palpation  No open wound noted  Lungs: Clear to auscultation bilaterally, Respirations unlabored  Cardiac: Regular rate and rhythm, +S1and S2  Abdomen: Soft, non-tender, non-distended  Bowel sounds are normal    Extremities:  No edema, cyanosis, clubbing  Skin: Skin color, turgor are normal    Lymphatics: no palpable supra-cervical, axillary, or inguinal adenopathy  Neurologic: Awake, Alert, and oriented, no gross focal deficits noted b/l  Posterior (back) b/l neph tubes noted that are c/d/i   [facility] c/d/i      DATA REVIEW:    Pathology Result:    Final Diagnosis   Date Value Ref Range Status   10/18/2021   Final    A  Left posterior wall, bladder (transurethral resection):     - Urothelial tissue with small atypical cauterized focus favored to represent superficially invasive high-grade urothelial carcinoma  - Muscularis propria (detrusor muscle) present, and negative for carcinoma  - Marked edema and mucosal denudation with thermal artifact noted  B  Anterior wall, bladder (transurethral resection):      - Invasive high-grade urothelial carcinoma  - Carcinoma invades muscularis propria (detrusor muscle)  C   Left lateral wall, bladder (transurethral resection):     - Urothelial tissue with small atypical focus with marked thermal artifact; cannot exclude superficial carcinoma  - Muscularis propria (detrusor muscle) present, and negative for carcinoma  - Marked edema and mucosal denudation with thermal artifact noted  Comment: This is an appended report  These results have been appended to a previously preliminary verified report  10/18/2021   Final    A   Renal Washing, RIGHT RENAL PELVIS WASHING:  Suspicious for high grade urothelial carcinoma [facility]) - see comment  Comment:  The above diagnostic category is from the recently published book, [facility] [place] [facility] Urinary Cytology, and is in keeping with the ongoing effort for utilization of standardized diagnostic terminology in urine cytology  *    *[employer] [place] [facility] Urinary Cytology  Dorothy L Lovette Ping [place]; 2016  B  Renal Washing, LEFT RENAL PELVIS WASHING:  Atypical urothelial cells (AUC) - see comment  Comment:  The above diagnostic category is from the recently published book, [facility] [place] [facility] Urinary Cytology, and is in keeping with the ongoing effort for utilization of standardized diagnostic terminology in urine cytology  *    *[employer] [place] [facility] Urinary Cytology  Renetta Vargas [place]; 2016          09/03/2021   Final    A  Urine, Voided, :  Atypical urothelial cells (AUC) - see comment  Red blood cells     Satisfactory for evaluation  Comment:    - Few atypical urothelial cells noted in this patient with a 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  A high grade urothelial carcinoma cannot be excluded on this material  Suggest clinical correlation and follow-up as indicated  - The above diagnostic category is from the recently published book, [facility] [place] [facility] Urinary Cytology, and is in keeping with the ongoing effort for utilization of standardized diagnostic terminology in urine cytology  *    *[facility] [place] [facility] Urinary Cytology  Dorothy L Lovette Ping [place]; 2016 '          12/31/2020   Final    A  Left 5th metatarsal bone:  - Acute osteomyelitis in benign metatarsal bone  12/21/2020   Final    A  Bone, left 5th metatarsal, amputation:       - Acute osteomyelitis  - No dysplasia or malignancy is identified      B  Bone, 5th metatarsal clean margin, excision: - Focal acute osteomyelitis  - Large caliber blood vessels with luminal calcifications and greater than 75% occlusion        - No dysplasia or malignancy is identified  Interpretation performed at [address] [address]       11/19/2020   Final    A  Urinary Bladder, Left Posterior Bladder Wall:  -Extensively- denuded urothelial lined mucosa with mild chronic inflammation, vascular congestion  And edema   -Unremarkable small fragment of detrusor muscle present  -No evidence of invasive urothelial carcinoma seen    B  Urinary Bladder, Left lateral bladder Wall:  -Partially-denuded urothelial lined mucosa with mild  chronic inflammation  -Unremarkable small fragment of detrusor muscle present  -No evidence of invasive urothelial carcinoma seen    C  Urinary Bladder, Right Posterior Bladder wall:  -Urothelial lined mucosa with mild  chronic inflammation Von Brunn nests and mild urothelial atypia, possibly due to previous BCG administration   -Unremarkable small fragment of detrusor muscle present  -No evidence of invasive urothelial carcinoma seen    D  Urinary Bladder, Right Lateral Bladder Wall:  - Urothelial lined mucosa with mild  chronic inflammation   -Muscularis propria/ detrusor muscle is not present for evaluation    -No evidence of invasive urothelial carcinoma seen  E  Prostate, Prostate Tisssue:  -Urothelial lined mucosa with mild chronic inflammation, prominent Von Brunn nests and Cystitis cystica   -Unremarkable small fragment of detrusor muscle present   -No evidence of malignancy seen  08/17/2020   Final    A  Urine, Other, :  Negative for high grade urothelial carcinoma ([address]) - see comment  Urothelial cells, squamous cells, red blood cells and neutrophils  Satisfactory for evaluation      Comment:  The above diagnostic category is from the recently published book, [facility] [place] [facility] Urinary Cytology, and is in keeping with the ongoing effort for utilization of standardized diagnostic terminology in urine cytology  *    *The [facility] for Reporting Urinary Cytology  Renetta Mccoyching; 2016             08/16/2016   Final    A   Bladder, biopsy:  -  High-grade urothelial carcinoma with flat and focal papillary architecture (see note)  07/18/2016   Final    A  Urine, clean catch (ThinPrep): Atypical urothelial cells (AUC) - see comment  Atypical single urothelial cells, benign squamous cells, red blood cells and neutrophils  Background of degenerative changes noted  Satisfactory for evaluation  Comment:  The above diagnostic category is from the recently published book, The [facility] for Reporting Urinary Cytology, and is in keeping with the ongoing effort for utilization of standardized diagnostic terminology in urine cytology  *    *[facility] 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  Few red blood cells also identified  Satisfactory for evaluation  Image Results:   Image result are reviewed and documented in Hematology/Oncology history    XR elbow 2 vw left  Narrative: LEFT ELBOW    INDICATION:   mechanical fall, pain  COMPARISON:  None    VIEWS:  XR ELBOW 2 VW LEFT     FINDINGS:    There is no acute fracture or dislocation  There is no joint effusion  Prominent spur off the olecranon  Calcifications about both epicondyles  No lytic or blastic osseous lesion  There are atherosclerotic calcifications  Soft tissues are otherwise unremarkable  Impression: No acute osseous abnormality      Workstation performed: [id number]  XR shoulder 2+ vw left  Narrative: LEFT SHOULDER    INDICATION:   mechanical fall, pain  COMPARISON:  Compared with 6/15/17    VIEWS:  XR SHOULDER 2+ VW LEFT     FINDINGS:    There is no acute fracture or dislocation  Tendon tacking sutures over the humeral head  Mild osteoarthritis of the glenohumeral and acromioclavicular joints  No lytic or blastic osseous lesion  Soft tissues are unremarkable  Impression: No acute osseous abnormality  Workstation performed: [id number]      LABS:  Lab data are reviewed and documented in HemOnc history  Lab Results   Component Value Date    HGB 9 2 (L) 03/02/2022    HCT 28 8 (L) 03/02/2022    MCV 99 (H) 03/02/2022     03/02/2022    WBC 5 18 03/02/2022    NRBC 0 02/28/2022    BANDSPCT 3 02/04/2022    ATYLMPCT 1 (H) 02/04/2022     Lab Results   Component Value Date     09/03/2015    K 4 8 03/02/2022     03/02/2022    CO2 20 (L) 03/02/2022    ANIONGAP 5 09/03/2015    BUN 47 (H) 03/02/2022    CREATININE 2 90 (H) 03/02/2022    GLUCOSE 203 (H) 09/03/2015    GLUF 187 (H) 02/04/2022    CALCIUM 8 7 03/02/2022    CORRECTEDCA 9 3 02/25/2022    AST 62 (H) 02/25/2022    ALT 87 (H) 02/25/2022    ALKPHOS 211 (H) 02/25/2022    PROT 6 4 07/26/2015    BILITOT 0 50 07/26/2015    EGFR 19 03/02/2022       Lab Results   Component Value Date    IRON 186 (H) 12/10/2021    TIBC 377 12/10/2021    FERRITIN 23 12/10/2021    FERRITIN 40 10/13/2021    FERRITIN 31 06/22/2021    FERRITIN 23 02/17/2021    FERRITIN 52 12/19/2020    FERRITIN 73 09/19/2018       No results found for: [id number]    No results for input(s): WBC, CREAT in the last 72 hours      Invalid input(s):  PLT    By:  Amador Mcguire MD, 3/11/2022, 8:24 AM

## 2022-03-11 NOTE — PROGRESS NOTES
Pt seen by Dr Haleigh Chen this morning and will be continuing with chemotherapy and radiation  No follow-up with Dr Raquel Sanford currently scheduled  Pt needs follow-up with Dr Priyanka Tovar in 1-2 weeks as requested by Dr Haleigh Chen  Please schedule  Pt originally scheduled for April but appt had been cancelled      Last day of chemo/rads 3/24/2022, post treatment imaging scheduled for 6/3/2022

## 2022-03-14 ENCOUNTER — APPOINTMENT (OUTPATIENT)
Dept: RADIATION ONCOLOGY | Facility: HOSPITAL | Age: 77
End: 2022-03-14
Attending: INTERNAL MEDICINE
Payer: MEDICARE

## 2022-03-15 ENCOUNTER — APPOINTMENT (OUTPATIENT)
Dept: RADIATION ONCOLOGY | Facility: HOSPITAL | Age: 77
End: 2022-03-15
Attending: INTERNAL MEDICINE
Payer: MEDICARE

## 2022-03-15 ENCOUNTER — TELEPHONE (OUTPATIENT)
Dept: PALLIATIVE MEDICINE | Age: 77
End: 2022-03-15

## 2022-03-15 DIAGNOSIS — C67.3 MALIGNANT NEOPLASM OF ANTERIOR WALL OF URINARY BLADDER (HCC): Primary | ICD-10-CM

## 2022-03-15 NOTE — TELEPHONE ENCOUNTER
3/11/2022 -Left message on machine to return call to the office to schedule a consult appointment with Palliative care    3/15/2022 -Left message for patient to return call to the office to schedule a consult appointment with Palliative care

## 2022-03-16 ENCOUNTER — TELEPHONE (OUTPATIENT)
Dept: HEMATOLOGY ONCOLOGY | Facility: CLINIC | Age: 77
End: 2022-03-16

## 2022-03-16 ENCOUNTER — APPOINTMENT (OUTPATIENT)
Dept: RADIATION ONCOLOGY | Facility: HOSPITAL | Age: 77
End: 2022-03-16
Attending: INTERNAL MEDICINE
Payer: MEDICARE

## 2022-03-16 NOTE — TELEPHONE ENCOUNTER
Received a voicemail from [facility], home health nurse taking care of Kristofer  Asking if Dr Demetris Capps will order anything different for his pain  Dr Demetris Capps ordered dilaudid for patient on 3/11 until he see palliative care  Returned call to [facility] and left her a voicemail informing her that Dr Demetris Capps prescribed the dilaudid to help manage pain until he can see palliative care  Palliative care has called patient twice to set up an appointment and he has not called them back (see note from Callie on 3/15/22)  Dr Demetris Capps would like patient to see palliative care, and will not be ordering more pain medication  Provided [facility] with call back number if she has any further questions

## 2022-03-18 ENCOUNTER — TELEPHONE (OUTPATIENT)
Dept: HEMATOLOGY ONCOLOGY | Facility: CLINIC | Age: 77
End: 2022-03-18

## 2022-03-18 NOTE — TELEPHONE ENCOUNTER
Left VM for patient to make him aware that his cadd was scheduled incorrectly  He is a 5 day pump  He will need to go to the Infusion center on Monday 3/21 at 130pm and d/c on Saturday

## 2022-03-18 NOTE — TELEPHONE ENCOUNTER
I called the patient & left a message with the information that Dr Vani Flynn will be at a different location on Wednesdays & Thursdays  His appointment on 4/06 had to be rescheduled to 4/08 @ 8 am in [facility] [address]

## 2022-03-21 ENCOUNTER — HOSPITAL ENCOUNTER (OUTPATIENT)
Dept: INFUSION CENTER | Facility: HOSPITAL | Age: 77
Discharge: HOME | End: 2022-03-21
Attending: INTERNAL MEDICINE
Payer: MEDICARE

## 2022-03-21 VITALS
DIASTOLIC BLOOD PRESSURE: 65 MMHG | HEART RATE: 97 BPM | BODY MASS INDEX: 30.25 KG/M2 | TEMPERATURE: 97 F | RESPIRATION RATE: 16 BRPM | WEIGHT: 223.33 LBS | SYSTOLIC BLOOD PRESSURE: 110 MMHG | HEIGHT: 72 IN

## 2022-03-21 DIAGNOSIS — C67.3 MALIGNANT NEOPLASM OF ANTERIOR WALL OF URINARY BLADDER (HCC): Primary | ICD-10-CM

## 2022-03-21 LAB
ALBUMIN SERPL BCP-MCNC: 3.1 G/DL (ref 3.5–5)
ALP SERPL-CCNC: 190 U/L (ref 46–116)
ALT SERPL W P-5'-P-CCNC: 101 U/L (ref 12–78)
ANION GAP SERPL CALCULATED.3IONS-SCNC: 6 MMOL/L (ref 4–13)
AST SERPL W P-5'-P-CCNC: 51 U/L (ref 5–45)
BASOPHILS # BLD AUTO: 0.02 THOUSANDS/ΜL (ref 0–0.1)
BASOPHILS NFR BLD AUTO: 0 % (ref 0–1)
BILIRUB SERPL-MCNC: 0.35 MG/DL (ref 0.2–1)
BUN SERPL-MCNC: 42 MG/DL (ref 5–25)
CALCIUM ALBUM COR SERPL-MCNC: 9.5 MG/DL (ref 8.3–10.1)
CALCIUM SERPL-MCNC: 8.8 MG/DL (ref 8.3–10.1)
CHLORIDE SERPL-SCNC: 106 MMOL/L (ref 100–108)
CO2 SERPL-SCNC: 25 MMOL/L (ref 21–32)
CREAT SERPL-MCNC: 3.12 MG/DL (ref 0.6–1.3)
EOSINOPHIL # BLD AUTO: 0.11 THOUSAND/ΜL (ref 0–0.61)
EOSINOPHIL NFR BLD AUTO: 2 % (ref 0–6)
ERYTHROCYTE [DISTWIDTH] IN BLOOD BY AUTOMATED COUNT: 14.6 % (ref 11.6–15.1)
GFR SERPL CREATININE-BSD FRML MDRD: 18 ML/MIN/1.73SQ M
GLUCOSE SERPL-MCNC: 178 MG/DL (ref 65–140)
HCT VFR BLD AUTO: 26.4 % (ref 36.5–49.3)
HGB BLD-MCNC: 8.9 G/DL (ref 12–17)
IMM GRANULOCYTES # BLD AUTO: 0.01 THOUSAND/UL (ref 0–0.2)
IMM GRANULOCYTES NFR BLD AUTO: 0 % (ref 0–2)
LYMPHOCYTES # BLD AUTO: 1.77 THOUSANDS/ΜL (ref 0.6–4.47)
LYMPHOCYTES NFR BLD AUTO: 37 % (ref 14–44)
MCH RBC QN AUTO: 31.2 PG (ref 26.8–34.3)
MCHC RBC AUTO-ENTMCNC: 33.7 G/DL (ref 31.4–37.4)
MCV RBC AUTO: 93 FL (ref 82–98)
MONOCYTES # BLD AUTO: 0.61 THOUSAND/ΜL (ref 0.17–1.22)
MONOCYTES NFR BLD AUTO: 13 % (ref 4–12)
NEUTROPHILS # BLD AUTO: 2.22 THOUSANDS/ΜL (ref 1.85–7.62)
NEUTS SEG NFR BLD AUTO: 48 % (ref 43–75)
NRBC BLD AUTO-RTO: 0 /100 WBCS
PLATELET # BLD AUTO: 150 THOUSANDS/UL (ref 149–390)
PMV BLD AUTO: 9.7 FL (ref 8.9–12.7)
POTASSIUM SERPL-SCNC: 4.8 MMOL/L (ref 3.5–5.3)
PROT SERPL-MCNC: 6.9 G/DL (ref 6.4–8.2)
RBC # BLD AUTO: 2.85 MILLION/UL (ref 3.88–5.62)
SODIUM SERPL-SCNC: 137 MMOL/L (ref 136–145)
WBC # BLD AUTO: 4.74 THOUSAND/UL (ref 4.31–10.16)

## 2022-03-21 PROCEDURE — G0498 CHEMO EXTEND IV INFUS W/PUMP: HCPCS

## 2022-03-21 PROCEDURE — 80053 COMPREHEN METABOLIC PANEL: CPT | Performed by: INTERNAL MEDICINE

## 2022-03-21 PROCEDURE — 96365 THER/PROPH/DIAG IV INF INIT: CPT

## 2022-03-21 PROCEDURE — 85025 COMPLETE CBC W/AUTO DIFF WBC: CPT | Performed by: INTERNAL MEDICINE

## 2022-03-21 RX ORDER — SODIUM CHLORIDE 9 MG/ML
20 INJECTION, SOLUTION INTRAVENOUS ONCE
Status: COMPLETED | OUTPATIENT
Start: 2022-03-21 | End: 2022-03-21

## 2022-03-21 RX ADMIN — SODIUM CHLORIDE 20 ML/HR: 9 INJECTION, SOLUTION INTRAVENOUS at 14:41

## 2022-03-21 RX ADMIN — DEXAMETHASONE SODIUM PHOSPHATE: 10 INJECTION, SOLUTION INTRAMUSCULAR; INTRAVENOUS at 14:41

## 2022-03-21 NOTE — PROGRESS NOTES
Port flushed and central labs drawn  Patient connected to CADD, CADD running and green light visible, double checked with 2nd RN    Patient aware of return time Saturday at 3 pm

## 2022-03-22 ENCOUNTER — TELEPHONE (OUTPATIENT)
Dept: OTHER | Facility: OTHER | Age: 77
End: 2022-03-22

## 2022-03-22 ENCOUNTER — APPOINTMENT (OUTPATIENT)
Dept: RADIATION ONCOLOGY | Facility: HOSPITAL | Age: 77
End: 2022-03-22
Attending: INTERNAL MEDICINE
Payer: MEDICARE

## 2022-03-22 ENCOUNTER — HOSPITAL ENCOUNTER (OUTPATIENT)
Dept: INFUSION CENTER | Facility: HOSPITAL | Age: 77
Discharge: HOME | End: 2022-03-22
Attending: INTERNAL MEDICINE

## 2022-03-22 PROCEDURE — 77386 HB NTSTY MODUL RAD TX DLVR CPLX: CPT | Performed by: RADIOLOGY

## 2022-03-22 PROCEDURE — 77014 CHG CT GUIDANCE PLACEMENT RAD THERAPY FIELDS: CPT | Performed by: RADIOLOGY

## 2022-03-22 NOTE — TELEPHONE ENCOUNTER
Patient: Sandi Campos    Cornelius Waller M.D. Responsible Provider: Mu Springer He was seen yesterday and patient complain that he is urinating out of his urethra but he has two nephrostomy    bags       Please send orders if proivder wishes for RN to perform a flush or anything to address patient complain     Fax# [phone]

## 2022-03-22 NOTE — TELEPHONE ENCOUNTER
Returned call to Thu BRAN states that nephrostomy tubes are draining and flushing with out and difficulty  Urine from nephrostomy tube is clear   Patient did not report urine from urethra  Advised SHANT RN that B&O suppositories   were prescribed by our practice for bladder spasms   She was not aware that patient was prescribed medication  SHANT RN is going on Friday for visit with patient  Patient states that he is has rectal pain and burning with urination  Patient is on dilaudid  Patient is getting evaluation for palliative care not sure when  Pcp will not given anything in addition for pain until palliative evaluation  Second message about flushing is an error as reported by SHANT RN   SHANT has existing orders for flushing nephrostomy tubes

## 2022-03-23 ENCOUNTER — APPOINTMENT (OUTPATIENT)
Dept: RADIATION ONCOLOGY | Facility: HOSPITAL | Age: 77
End: 2022-03-23
Attending: INTERNAL MEDICINE
Payer: MEDICARE

## 2022-03-23 PROCEDURE — 77386 HB NTSTY MODUL RAD TX DLVR CPLX: CPT | Performed by: INTERNAL MEDICINE

## 2022-03-23 PROCEDURE — 77014 CHG CT GUIDANCE PLACEMENT RAD THERAPY FIELDS: CPT | Performed by: INTERNAL MEDICINE

## 2022-03-23 PROCEDURE — 77336 RADIATION PHYSICS CONSULT: CPT | Performed by: INTERNAL MEDICINE

## 2022-03-24 ENCOUNTER — APPOINTMENT (OUTPATIENT)
Dept: RADIATION ONCOLOGY | Facility: HOSPITAL | Age: 77
End: 2022-03-24
Attending: INTERNAL MEDICINE
Payer: MEDICARE

## 2022-03-24 PROCEDURE — 77014 CHG CT GUIDANCE PLACEMENT RAD THERAPY FIELDS: CPT | Performed by: RADIOLOGY

## 2022-03-24 PROCEDURE — 77385 HB NTSTY MODUL RAD TX DLVR SMPL: CPT | Performed by: RADIOLOGY

## 2022-03-25 ENCOUNTER — CONSULT (OUTPATIENT)
Dept: PALLIATIVE MEDICINE | Facility: CLINIC | Age: 77
End: 2022-03-25
Payer: MEDICARE

## 2022-03-25 ENCOUNTER — SOCIAL WORK (OUTPATIENT)
Dept: PALLIATIVE MEDICINE | Facility: CLINIC | Age: 77
End: 2022-03-25
Payer: MEDICARE

## 2022-03-25 VITALS
DIASTOLIC BLOOD PRESSURE: 70 MMHG | SYSTOLIC BLOOD PRESSURE: 120 MMHG | RESPIRATION RATE: 14 BRPM | TEMPERATURE: 98 F | HEART RATE: 72 BPM | OXYGEN SATURATION: 98 %

## 2022-03-25 DIAGNOSIS — C67.9 BLADDER CARCINOMA (HCC): ICD-10-CM

## 2022-03-25 DIAGNOSIS — F11.20 CONTINUOUS OPIOID DEPENDENCE (HCC): Primary | ICD-10-CM

## 2022-03-25 DIAGNOSIS — G89.3 CANCER RELATED PAIN: ICD-10-CM

## 2022-03-25 DIAGNOSIS — Z71.89 COUNSELING AND COORDINATION OF CARE: Primary | ICD-10-CM

## 2022-03-25 DIAGNOSIS — C67.3 MALIGNANT NEOPLASM OF ANTERIOR WALL OF URINARY BLADDER (HCC): ICD-10-CM

## 2022-03-25 PROCEDURE — 99204 OFFICE O/P NEW MOD 45 MIN: CPT | Performed by: NURSE PRACTITIONER

## 2022-03-25 PROCEDURE — NC001 PR NO CHARGE

## 2022-03-25 RX ORDER — OXYCODONE HYDROCHLORIDE 5 MG/1
5-10 TABLET ORAL EVERY 4 HOURS PRN
Qty: 60 TABLET | Refills: 0 | Status: ON HOLD | OUTPATIENT
Start: 2022-03-25 | End: 2022-04-13 | Stop reason: SDUPTHER

## 2022-03-25 RX ORDER — SENNA PLUS 8.6 MG/1
1 TABLET ORAL DAILY
Qty: 30 TABLET | Refills: 0 | Status: SHIPPED | OUTPATIENT
Start: 2022-03-25 | End: 2022-05-04

## 2022-03-25 NOTE — PROGRESS NOTES
Outpatient Consultation - Palliative and Supportive Care   Kj Zimmer 76 y o  male [id number]    Assessment & Plan  Problem List Items Addressed This Visit        Genitourinary    Bladder carcinoma [facility])    Malignant neoplasm of anterior wall of urinary bladder ([facility] [address] )       Other    Continuous opioid dependence ([facility] [address] ) - Primary      Other Visit Diagnoses     Cancer related pain        Relevant Medications    oxyCODONE (Roxicodone) 5 immediate release tablet    senna (SENOKOT) 8 6 MG tablet          Medications adjusted this encounter:  Requested Prescriptions     Signed Prescriptions Disp Refills    oxyCODONE (Roxicodone) 5 immediate release tablet 60 tablet 0     Sig: Take 1-2 tablets (5-10 mg total) by mouth every 4 (four) hours as needed for moderate pain (5 mg moderate, 10 mg severe) Max Daily Amount: 60 mg    senna (SENOKOT) 8 6 MG tablet 30 tablet 0     Sig: Take 1 tablet (8 6 mg total) by mouth daily     No orders of the defined types were placed in this encounter  Medications Discontinued During This Encounter   Medication Reason    HYDROmorphone (DILAUDID) 2 mg tablet     docusate sodium (COLACE) 100 mg capsule        PPS: 60      Kj Zimmer was seen today for symptoms and planning cares related to above illnesses  Above orders were sent electronically, or provided in hardcopy in clinic  I have reviewed the patient's controlled substance dispensing history in the Prescription Drug Monitoring Program in compliance with the [facility] regulations before prescribing any controlled substances  We appreciate the referral, and wish for him to continue to follow with us  If there are questions or concerns, please contact us through our clinic/answering service 24 hours a day, seven days a week      April D EVANGELINA Byrne  [address] Palliative and Supportive Care  [phone]        Visit Information    Accompanied By: Spouse    Source of History: Self, Spouse    History Limitations: None      History of Present Illness      Ed Ivette is a 76 y o  male who presents as a referral for primary palliative diagnosis of  Bladder cancer which was diagnosed in originally in 1994  Consultation is requested for: symptom management, goal of care assessment and decisional support, disease process education and discussion of prognosis, emotional support in the setting of serious illness  PMHx notable for spinal stenosis, Gastric bypass (2011), 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, diabetic nephropathy, and renal vascular disease, DM, being seen in consultation for bladder cancer        The patient had  TURBT and chemotherapy at the time of diagnosis  He has been intermittently on treatment since that time  He follows with Dr Rupali Mcdonald of [address] [place] [address] Oncology and is currently completing RT and chemotherapy  The patient is seen with his wife  The role of palliative care was explained  At his most recent oncology visit, he had increasing symptoms such as sleep disturbance and cancer related pain and was started on hydromorphone 2 mg Q 6 hours prn  The patient reports this was ineffective for pain  He has significant  Severe pain and burning in his urethra when urinating  He constantly has a dull burning pain present, but it is aggrevated by urination  He was recently prescribed B&O suppositories but has been unable to fill since his insurance declined coverage  His wife states that the office that prescribed is working on getting it covered  B&O may be very effective for pain as he describes  When discussing previous opioid effectiveness the patient states that what he was taking in thehospital was most effective  Review of chart revealed that patient previously was taking oxycodone in the hosptial   Will start oxycodone 5mg -10 mg for cancer related pain  Explained risks of drowsiness and constipation  Will initiate Senna for OIC prevention  He is sleeping in 20 minute increments at night    He attributes this to having to urinate every hour which is painful  Hopefully that if pain is better controlled her will be able to sleep better  Also discussed importance of having nighttime routine  Reviewed Opioid agreement and appropriaite use of opioids  The patient has a supportive family, including his wife and children  Pertinent Palliative Care Domains    Physical Symptoms:ambulates with difficulty    Psychological Symptoms:moderate anxiety    Social Aspects: family supportive, has one son       Advance Directive and Living Will:      Power of :    POLST:      Historical Data  Past Medical History:   Diagnosis Date    Anemia     Last assessed: 9/28/17    Anxiety     Arteriosclerotic cardiovascular disease     Last assessed: 9/28/17    Arthritis     Bladder cancer ([place] [address] )     bladder- had BCG treatments    Chronic kidney disease     Stage IV    CKD (chronic kidney disease) stage 4, GFR 15-29 ml/min ([facility])     Colon polyp     Coronary artery disease     7 stents    Depression     Diabetes mellitus ([place] [address] )     IDDM    GERD (gastroesophageal reflux disease)     Glaucoma     Hematuria     History of fusion of cervical spine     Hyperlipidemia     Hypertension     Insomnia     Last assessed: 11/14/12    Loss of hearing     has hearing aids but usually does not wear them    Other seasonal allergic rhinitis     Last assessed: 2/10/16    PAD (peripheral artery disease) ([facility])     Shortness of breath     on exertion    Spinal stenosis of lumbar region     Transient cerebral ischemia     No Residual    Uses walker     w/c for longer distances     Past Surgical History:   Procedure Laterality Date    CARDIAC SURGERY      Cath stent placement  Last assessed: 3/9/17  Interventional Catheterization    CHOLECYSTECTOMY      COLONOSCOPY      CYSTOSCOPY      Diagnostic w/biopsy  Quan Cisse   Last assessed: 12/1/14    CYSTOSCOPY W/ RETROGRADES Right 3/1/2022    Procedure: CYSTO; stent removal retrograde;  Surgeon: Vic Prakash MD;  [facility];  Service: Urology    CYSTOSCOPY W/ URETERAL STENT PLACEMENT Bilateral 10/18/2021    Procedure: bilateral retrogrades, cytology collection;  Surgeon: Vic Prakash MD;  [facility];  Service: Urology    CYSTOURETHROSCOPY      w/cautery  Quan Tanna    FL RETROGRADE PYELOGRAM  10/18/2021    FL RETROGRADE PYELOGRAM  10/24/2021    GASTRIC BYPASS      For morbid obesity w/Shaji-en-Y   Resolved: 11/17/09    INCISION AND DRAINAGE OF WOUND Right 2/26/2017    Procedure: INCISION AND DRAINAGE (I&D) EXTREMITY WITH APPLICATION OF GRAFT JACKET;  Surgeon: Dayanna Forrest DPM;  [facility];  Service:     INCISION AND DRAINAGE OF WOUND Right 4/25/2017    Procedure: INCISION AND DRAINAGE (I&D) EXTREMITY, APPLICATION OF GRAFT;  Surgeon: Dayanna Forrest DPM;  [facility];  Service:     IR BIOPSY OTHER  7/2/2020    IR LOWER EXTREMITY ANGIOGRAM  2/8/2021    IR LOWER EXTREMITY ANGIOGRAM  2/11/2021    IR NEPHROSTOMY TUBE PLACEMENT  2/25/2022    IR PORT PLACEMENT  1/17/2022    IR TUNNELED CENTRAL LINE PLACEMENT  12/24/2020    JOINT REPLACEMENT      christofer knees replaced    NE AMPUTATION METATARSAL+TOE,SINGLE Left 12/21/2020    Procedure: RAY RESECTION FOOT;  Surgeon: Jaquelin Chavez DPM;  [facility];  Service: Podiatry    NE AMPUTATION METATARSAL+TOE,SINGLE Left 12/31/2020    Procedure: 5TH MET RESECTION;  Surgeon: Jaquelin Chavez DPM;  [facility];  Service: Podiatry    NE CYSTOURETHROSCOPY W/IRRIG & EVAC CLOTS N/A 2/10/2021    Procedure: CYSTOSCOPY EVACUATION OF CLOTS, fulguration;  Surgeon: Pam Cardenas MD;  [facility];  Service: Urology    NE CYSTOURETHROSCOPY W/IRRIG & EVAC CLOTS N/A 10/24/2021    Procedure: CYSTOSCOPY EVACUATION OF CLOT, fulguration of bleeding vessels, right ureter stent placement, retrograde pyelogram;  Surgeon: Dennis Landaverde MD;  [facility];  Service: Urology    MI CYSTOURETHROSCOPY,BIOPSY N/A 2016    Procedure: CYSTOSCOPY WITH BIOPSIES;  Surgeon: Liberty Varner MD;  [facility];  Service: Urology    MI CYSTOURETHROSCOPY,FULGUR <0 5 CM LESN N/A 2020    Procedure: CYSTO W/BIOPSIES, transurethral prostate bx;  Surgeon: Kylee Vegas MD;  [facility];  Service: Urology    MI CYSTOURETHROSCOPY,FULGUR >5 CM LESN Bilateral 10/18/2021    Procedure: TRANSURETHRAL RESECTION OF BLADDER TUMOR (TURBT);   Surgeon: Carolina Bradley MD;  [facility];  Service: Urology    MI [place] 3RD+ ORD SLCTV ABDL PEL/[facility] Left 2021    Procedure: LEG angiogram, CO2 w/limited contrast with balloon angioplasty postertior tibial artery;  Surgeon: Rommel De La Torre MD;  [facility];  Service: Vascular    ROTATOR CUFF REPAIR      SMALL INTESTINE SURGERY      Surgery Shaji-en-Y    SPINAL FUSION      lumbar and cervical fusions    VAC DRESSING APPLICATION Right     Procedure: APPLICATION VAC DRESSING;  Surgeon: Ray Almanzar DPM;  [facility];  Service:    [address] [place] [address] Left 2021    Procedure: FOOT DEBRIDE, [address] OUT w/graft application;  Surgeon: Ray Almanzar DPM;  [facility];  Service: Podiatry     Social History     Socioeconomic History    Marital status: /Civil Union     Spouse name: Not on file    Number of children: Not on file    Years of education: Not on file    Highest education level: Not on file   Occupational History    Not on file   Tobacco Use    Smoking status: Former Smoker     Packs/day: 3 00     Years: 27 00     Pack years: 81 00     Types: Cigarettes     Quit date: 1970     Years since quittin 2    Smokeless tobacco: Never Used   Vaping Use    Vaping Use: Never used   Substance and Sexual Activity    Alcohol use: Never     Comment: beer / liquor    Drug use: Not Currently     Types: Marijuana     Comment: quit 2019 had medical marijuana    Sexual activity: Not Currently   Other Topics Concern    Not on file   Social History Narrative    Consumes 1 cup of coffee and 1 soda per day     Social Determinants of Health     Financial Resource Strain: Not on file   Food Insecurity: Not on file   Transportation Needs: Not on file   Physical Activity: Not on file   Stress: Not on file   Social Connections: Not on file   Intimate Partner Violence: Not on file   Housing Stability: Not on file     Family History   Problem Relation Age of Onset    Diabetes Mother     Heart disease Mother     Other Mother         High blood pressure    Heart disease Father     Diabetes Sister     Other Sister         High blood pressure    Kidney disease Sister     Heart disease Brother     Other Brother         High blood pressure     Allergies   Allergen Reactions    Atorvastatin Hives, Itching and Rash    Simvastatin Rash and Edema     Edema of lower legs    Statins Hives and Itching    Insulin Lispro Swelling and Edema     " Lower Legs"    Other Itching, Rash and Other (See Comments)     "EKG Patches"   "blue EKG patches"     Current Outpatient Medications   Medication Sig Dispense Refill    acetaminophen (TYLENOL) 325 mg tablet Take 650 mg by mouth every 6 (six) hours as needed for mild pain      aspirin (ECOTRIN LOW STRENGTH) 81 mg EC tablet Take 1 tablet (81 mg total) by mouth daily      belladonna-opium (B&O SUPPOSITORY) 16 2-30 mg suppository Insert 1 suppository into the rectum every 8 (eight) hours as needed for bladder spasms Max Daily Amount: 3 suppositories 15 suppository 0    Bimatoprost (LUMIGAN OP) Apply 1 drop to eye daily at bedtime       calcitriol (ROCALTROL) 0 25 mcg capsule Take 1 capsule (0 25 mcg total) by mouth daily 90 capsule 0    DULoxetine (CYMBALTA) 20 mg capsule Take 1 capsule (20 mg total) by mouth daily 90 capsule 0    ezetimibe (ZETIA) 10 mg tablet Take 1 tablet (10 mg total) by mouth daily in the early morning 90 tablet 0    Ferrous Sulfate Dried (Feosol) 200 (65 Fe) MG TABS Take 65 mg by mouth daily 90 tablet 0    fluorouracil 5,975 mg in CADD infusion pump Infuse 5,975 mg (500 mg/m2/day x 2 39 m2) into a venous catheter over 120 hours for 5 days  Do not start before March 22, 2022  1 each 0    fluticasone (FLONASE) 50 mcg/act nasal spray 1 spray into each nostril daily (Patient taking differently: 1 spray into each nostril as needed ) 11 mL 1    furosemide (LASIX) 20 mg tablet TAKE 1 TABLET BY MOUTH AS NEEDED FOR EDEMA 90 tablet 1    gabapentin (Neurontin) 300 mg capsule Take 1 capsule (300 mg total) by mouth daily at bedtime 30 capsule 2    lidocaine-prilocaine (EMLA) cream Apply topically as needed for mild pain 30 g 0    multivitamin (THERAGRAN) TABS Take 1 tablet by mouth daily        omeprazole (PriLOSEC) 20 mg delayed release capsule Take 20 mg by mouth daily in the early morning        oxybutynin (DITROPAN-XL) 10 MG 24 hr tablet Take 1 tablet (10 mg total) by mouth daily 30 tablet 0    Accu-Chek Softclix Lancets lancets Test blood sugar 4 times daily 200 each 0    ALPRAZolam (XANAX) 0 25 mg tablet At twice a day as needed for anxiety (Patient not taking: Reported on 3/25/2022 ) 30 tablet 0    Azelastine HCl 0 15 % SOLN Inhale 1 spray 2 (two) times a day 30 mL 3    fluocinonide (LIDEX) 0 05 % cream Apply topically 2 (two) times a day (Patient taking differently: Apply topically as needed ) 30 g 0    glucose blood (Accu-Chek Martha Plus) test strip Test blood sugar 4 times daily 200 strip 0    Insulin Pen Needle 31G X 8 MM MISC Inject 3 times a day 100 each 2    isosorbide mononitrate (IMDUR) 30 mg 24 hr tablet Take 1 tablet (30 mg total) by mouth daily in the early morning 90 tablet 0    Lantus SoloStar 100 units/mL injection pen 18 units qhs (Patient taking differently: Inject 18 Units under the skin daily at bedtime ) 30 mL 1    lidocaine (XYLOCAINE) 2 % topical gel Apply topically as needed for mild pain 30 mL 0    loperamide (IMODIUM) 2 mg capsule Take 2 mg by mouth 4 (four) times a day as needed for diarrhea      metoprolol succinate (TOPROL-XL) 100 mg 24 hr tablet Take 1 tablet (100 mg total) by mouth daily for 7 days 90 tablet 0    naloxone (NARCAN) 4 mg/0 1 mL nasal spray Administer 1 spray into a nostril  If no response after 2-3 minutes, give another dose in the other nostril using a new spray  (Patient not taking: Reported on 3/25/2022 ) 1 each 1    NovoLOG FlexPen 100 units/mL injection pen 10 units with each meal  (Patient taking differently: Inject 10 Units under the skin 3 (three) times a day with meals ) 5 pen 1    oxyCODONE (Roxicodone) 5 immediate release tablet Take 1-2 tablets (5-10 mg total) by mouth every 4 (four) hours as needed for moderate pain (5 mg moderate, 10 mg severe) Max Daily Amount: 60 mg 60 tablet 0    senna (SENOKOT) 8 6 MG tablet Take 1 tablet (8 6 mg total) by mouth daily 30 tablet 0    sertraline (ZOLOFT) 50 mg tablet Take 1 tablet (50 mg total) by mouth daily 90 tablet 0    sodium chloride, PF, 0 9 % 10 mL by Intracatheter route daily Intracatheter flushing daily 900 mL 0    sodium chloride, PF, 0 9 % 10 mL by Intracatheter route daily Intracatheter flushing daily 900 mL 0    tamsulosin (FLOMAX) 0 4 mg Take 1 capsule (0 4 mg total) by mouth daily at bedtime 90 capsule 0     No current facility-administered medications for this visit  Review of Systems   Constitutional: Positive for decreased appetite and malaise/fatigue  Genitourinary: Positive for dysuria, frequency, nocturia and pelvic pain  Neurological: Positive for weakness  All other systems reviewed and are negative  Vital Signs    /70 (BP Location: Right arm, Cuff Size: Standard)   Pulse 72   Temp 98 °F (36 7 °C) (Temporal)   Resp 14   SpO2 98%     Physical Exam and Objective Data  Physical Exam  Vitals and nursing note reviewed     Constitutional:       General: He is awake  Appearance: Normal appearance  HENT:      Head: Normocephalic and atraumatic  Jaw: There is normal jaw occlusion  Mouth/Throat:      Lips: Pink  Pharynx: Oropharynx is clear  Eyes:      General: Lids are normal       Extraocular Movements: Extraocular movements intact  Cardiovascular:      Rate and Rhythm: Normal rate and regular rhythm  Pulses: Normal pulses  Heart sounds: Normal heart sounds  Pulmonary:      Effort: Pulmonary effort is normal  No prolonged expiration, respiratory distress or retractions  Musculoskeletal:      Comments: Equal extremity strength   Skin:     General: Skin is warm and dry  Coloration: Skin is not jaundiced or pale  Neurological:      General: No focal deficit present  Mental Status: He is alert and oriented to person, place, and time  GCS: GCS eye subscore is 4  GCS verbal subscore is 5  GCS motor subscore is 6  Psychiatric:         Attention and Perception: Attention and perception normal          Mood and Affect: Mood is anxious  Behavior: Behavior is cooperative  Cognition and Memory: Cognition and memory normal           45+  minutes was spent face to face with his wife with greater than 50% of the time spent in counseling or coordination of care including discussions of etiology of diagnosis, pathogenesis of diagnosis, instructions for disease self management, follow up requirements, risk factors and risk reduction of disease and compliance with treatment regimen  All of the patient's questions were answered during this discussion

## 2022-03-25 NOTE — PROGRESS NOTES
Palliative Outpatient Assessment of Need    MSW completed an assessment of need which was completed with patient and his wife, Umair Feng, in the office  Family dynamics: Supportive Network  Relationship status:   Duration of relationship:   Name of significant other: Tori Le and Ages: 4 sons and 1 daughter  Pets:  Other important family information:  Living situation: Pt resides with his spouse    Patient's primary caregiver:  Self  Any limitations of caregiver: None  Environmental concerns or barriers:   history:  Employment history/source of income: Retired-Pt was a   Disability:  N/A  Spirituality/ Sikhism:    Patient's strengths, social supports, and resources: Pt has strong support from family  Cultural information:   [address] current or previous: Anxiety-he does have Xanax he can use as needed  Substance use or history:   Sleep: Poor- April educated him on the benefit of establishing a routine at night, hopefully he can do so when his pain is managed  Exercise: N/A  Diet/nutrition: Poor- we educated him on supplementing with Boost/Ensure/milkshakes and smoothies  He does have Ensure he will try  Durable Medical Equipment needs: None at this time  Transportation: Family  Financial concerns: None  Advanced Directive: Did not address  Other medical or social work providers involved: None  Patient/caregiver current level of coping: Pt is coping appropriately Understanding: He is understanding of his dx  Patient/family concerns and areas of need: Pts main concern is pain control  *All questions may not be answered due to constraints    Follow-up discussions may need to occur

## 2022-03-26 ENCOUNTER — HOSPITAL ENCOUNTER (OUTPATIENT)
Dept: INFUSION CENTER | Facility: HOSPITAL | Age: 77
Discharge: HOME | End: 2022-03-26
Attending: INTERNAL MEDICINE

## 2022-03-26 DIAGNOSIS — C67.3 MALIGNANT NEOPLASM OF ANTERIOR WALL OF URINARY BLADDER (HCC): Primary | ICD-10-CM

## 2022-03-26 NOTE — PROGRESS NOTES
Pt CADD disconnected at volume zero, port flushed per protocol, made appt in 6 weeks for port flush, confirmed appt with [facility] in 2 weeks, AVS provided

## 2022-03-31 ENCOUNTER — TELEPHONE (OUTPATIENT)
Dept: RADIATION ONCOLOGY | Facility: HOSPITAL | Age: 77
End: 2022-03-31

## 2022-03-31 ENCOUNTER — TELEPHONE (OUTPATIENT)
Dept: OTHER | Facility: OTHER | Age: 77
End: 2022-03-31

## 2022-03-31 NOTE — TELEPHONE ENCOUNTER
Pt would like to go ahead with [address]  He said he wants to go ahead and be scheduled for a sim @ [facility]? Asap  Please call PT to explain he had few questions for nurse then let me know what to schedule    [phone]

## 2022-03-31 NOTE — TELEPHONE ENCOUNTER
Darryle Mayers called to let you know that the suppository belladonna-opium for rectal pain was not covered so it needs a PA  He is being followed by Palliative Care right now and they were able to prescribe him some oxycodone   She just wanted to relay this message incase you still want him to fill this medication

## 2022-04-01 NOTE — TELEPHONE ENCOUNTER
I spoke with the patient  B&O suppositories are non-covered and are very expensive if obtained from a retail pharmacy  My suggestion would be [address]  The comparable alternative to B&O would be Belladonna and Morphine suppositories  Patient is very interested in the compounded alternative  Message forwarded back to the [facility] for further direction and/or advise

## 2022-04-04 ENCOUNTER — NURSE TRIAGE (OUTPATIENT)
Dept: OTHER | Facility: OTHER | Age: 77
End: 2022-04-04

## 2022-04-04 DIAGNOSIS — R31.9 HEMATURIA, UNSPECIFIED TYPE: Primary | ICD-10-CM

## 2022-04-04 NOTE — TELEPHONE ENCOUNTER
Returned call to patient states he has had pain for awhile, Patient states that Dr Saint Nordmann is aware  Has pain in penis when urinating  Patient states urine is fruit punch maybe a little darker, not wine color   Patient states does not change in color  Nephrostomy tube drainage is yellow in color  Left side nephrostomy tube had small amount of blood last week but cleared and drainage is reported as yellow  Patient is hydrating with water

## 2022-04-04 NOTE — TELEPHONE ENCOUNTER
Returned call to patient   Advised patient per MD recommendation  Patient states he will go to [facility] emergency department

## 2022-04-04 NOTE — TELEPHONE ENCOUNTER
Reason for Disposition   All other patients with blood in urine (Exception: could be normal menstrual bleeding)    Answer Assessment - Initial Assessment Questions  1  COLOR of URINE: "Describe the color of the urine "  (e g , tea-colored, pink, red, blood clots, bloody)     Red   2  ONSET: "When did the bleeding start?"       3 days ago   3  EPISODES: "How many times has there been blood in the urine?" or "How many times today?"      Each urination   4  PAIN with URINATION: "Is there any pain with passing your urine?" If Yes, ask: "How bad is the pain?"  (Scale 1-10; or mild, moderate, severe)     - MILD - complains slightly about urination hurting     - MODERATE - interferes with normal activities       - SEVERE - excruciating, unwilling or unable to urinate because of the pain       Severe pain with urination, 8 out of 10   5  FEVER: "Do you have a fever?" If Yes, ask: "What is your temperature, how was it measured, and when did it start?"       No   6  ASSOCIATED SYMPTOMS: "Are you passing urine more frequently than usual?"       Patient has nephrostomy, urine in nephrostomy bags is clear   7   OTHER SYMPTOMS: "Do you have any other symptoms?" (e g , back/flank pain, abdominal pain, vomiting)       Left flank pain    Protocols used: URINE - BLOOD IN-ADULT-OH

## 2022-04-04 NOTE — TELEPHONE ENCOUNTER
Returned call to patient  No answer  Left message on voice mail with provider recommendation   Will attempt to contact patient again with recommendation

## 2022-04-04 NOTE — TELEPHONE ENCOUNTER
I do not understand the question "CBI or urine testing and monitor for changes"    Since patient continues to be symptomatic please have him schedule in office evaluation by 1 of the providers

## 2022-04-04 NOTE — TELEPHONE ENCOUNTER
Regarding: Blood in Urine  ----- Message from Rupali Almanza sent at 4/4/2022  8:54 AM EDT -----  " I noticed on Friday that there was blood in my urine when I urinate, I am being treated for bladder cancer   I have heavy pain when I urinate, that's been going on for a while "

## 2022-04-05 ENCOUNTER — APPOINTMENT (OUTPATIENT)
Dept: LAB | Facility: CLINIC | Age: 77
DRG: 669 | End: 2022-04-05
Payer: MEDICARE

## 2022-04-05 DIAGNOSIS — R31.9 HEMATURIA, UNSPECIFIED TYPE: ICD-10-CM

## 2022-04-05 LAB

## 2022-04-05 PROCEDURE — 87186 SC STD MICRODIL/AGAR DIL: CPT

## 2022-04-05 PROCEDURE — 87086 URINE CULTURE/COLONY COUNT: CPT

## 2022-04-05 PROCEDURE — 81001 URINALYSIS AUTO W/SCOPE: CPT

## 2022-04-06 ENCOUNTER — HOSPITAL ENCOUNTER (INPATIENT)
Facility: HOSPITAL | Age: 77
LOS: 7 days | Discharge: HOME HEALTH | DRG: 669 | End: 2022-04-14
Attending: EMERGENCY MEDICINE | Admitting: INTERNAL MEDICINE
Payer: MEDICARE

## 2022-04-06 DIAGNOSIS — G89.3 CANCER RELATED PAIN: ICD-10-CM

## 2022-04-06 DIAGNOSIS — C67.9 BLADDER CARCINOMA (HCC): ICD-10-CM

## 2022-04-06 DIAGNOSIS — N39.0 URINARY TRACT INFECTION: ICD-10-CM

## 2022-04-06 DIAGNOSIS — R31.0 GROSS HEMATURIA: ICD-10-CM

## 2022-04-06 DIAGNOSIS — R31.9 HEMATURIA, UNSPECIFIED TYPE: Primary | ICD-10-CM

## 2022-04-06 LAB
APTT PPP: 30 SECONDS (ref 23–37)
BASOPHILS # BLD AUTO: 0.02 THOUSANDS/ΜL (ref 0–0.1)
BASOPHILS NFR BLD AUTO: 0 % (ref 0–1)
EOSINOPHIL # BLD AUTO: 0.12 THOUSAND/ΜL (ref 0–0.61)
EOSINOPHIL NFR BLD AUTO: 2 % (ref 0–6)
ERYTHROCYTE [DISTWIDTH] IN BLOOD BY AUTOMATED COUNT: 14.7 % (ref 11.6–15.1)
HCT VFR BLD AUTO: 27.1 % (ref 36.5–49.3)
HGB BLD-MCNC: 9.2 G/DL (ref 12–17)
IMM GRANULOCYTES # BLD AUTO: 0.02 THOUSAND/UL (ref 0–0.2)
IMM GRANULOCYTES NFR BLD AUTO: 0 % (ref 0–2)
INR PPP: 1.07 (ref 0.84–1.19)
LYMPHOCYTES # BLD AUTO: 1.4 THOUSANDS/ΜL (ref 0.6–4.47)
LYMPHOCYTES NFR BLD AUTO: 25 % (ref 14–44)
MCH RBC QN AUTO: 32.3 PG (ref 26.8–34.3)
MCHC RBC AUTO-ENTMCNC: 33.9 G/DL (ref 31.4–37.4)
MCV RBC AUTO: 95 FL (ref 82–98)
MONOCYTES # BLD AUTO: 0.5 THOUSAND/ΜL (ref 0.17–1.22)
MONOCYTES NFR BLD AUTO: 9 % (ref 4–12)
NEUTROPHILS # BLD AUTO: 3.55 THOUSANDS/ΜL (ref 1.85–7.62)
NEUTS SEG NFR BLD AUTO: 64 % (ref 43–75)
NRBC BLD AUTO-RTO: 0 /100 WBCS
PLATELET # BLD AUTO: 152 THOUSANDS/UL (ref 149–390)
PMV BLD AUTO: 9.9 FL (ref 8.9–12.7)
PROTHROMBIN TIME: 13.6 SECONDS (ref 11.6–14.5)
RBC # BLD AUTO: 2.85 MILLION/UL (ref 3.88–5.62)
WBC # BLD AUTO: 5.61 THOUSAND/UL (ref 4.31–10.16)

## 2022-04-06 PROCEDURE — 36415 COLL VENOUS BLD VENIPUNCTURE: CPT | Performed by: PHYSICIAN ASSISTANT

## 2022-04-06 PROCEDURE — 80053 COMPREHEN METABOLIC PANEL: CPT | Performed by: PHYSICIAN ASSISTANT

## 2022-04-06 PROCEDURE — 99285 EMERGENCY DEPT VISIT HI MDM: CPT

## 2022-04-06 PROCEDURE — 85730 THROMBOPLASTIN TIME PARTIAL: CPT | Performed by: PHYSICIAN ASSISTANT

## 2022-04-06 PROCEDURE — 86901 BLOOD TYPING SEROLOGIC RH(D): CPT | Performed by: PHYSICIAN ASSISTANT

## 2022-04-06 PROCEDURE — 1123F ACP DISCUSS/DSCN MKR DOCD: CPT | Performed by: PHYSICIAN ASSISTANT

## 2022-04-06 PROCEDURE — 86850 RBC ANTIBODY SCREEN: CPT | Performed by: PHYSICIAN ASSISTANT

## 2022-04-06 PROCEDURE — 86900 BLOOD TYPING SEROLOGIC ABO: CPT | Performed by: PHYSICIAN ASSISTANT

## 2022-04-06 PROCEDURE — 96374 THER/PROPH/DIAG INJ IV PUSH: CPT

## 2022-04-06 PROCEDURE — 85610 PROTHROMBIN TIME: CPT | Performed by: PHYSICIAN ASSISTANT

## 2022-04-06 PROCEDURE — 85025 COMPLETE CBC W/AUTO DIFF WBC: CPT | Performed by: PHYSICIAN ASSISTANT

## 2022-04-06 PROCEDURE — 99285 EMERGENCY DEPT VISIT HI MDM: CPT | Performed by: PHYSICIAN ASSISTANT

## 2022-04-06 PROCEDURE — 83690 ASSAY OF LIPASE: CPT | Performed by: PHYSICIAN ASSISTANT

## 2022-04-06 RX ORDER — HYDROMORPHONE HCL/PF 1 MG/ML
1 SYRINGE (ML) INJECTION ONCE
Status: COMPLETED | OUTPATIENT
Start: 2022-04-06 | End: 2022-04-06

## 2022-04-06 RX ADMIN — HYDROMORPHONE HYDROCHLORIDE 1 MG: 1 INJECTION, SOLUTION INTRAMUSCULAR; INTRAVENOUS; SUBCUTANEOUS at 23:45

## 2022-04-07 ENCOUNTER — APPOINTMENT (EMERGENCY)
Dept: CT IMAGING | Facility: HOSPITAL | Age: 77
DRG: 669 | End: 2022-04-07
Payer: MEDICARE

## 2022-04-07 PROBLEM — N18.4 CKD (CHRONIC KIDNEY DISEASE) STAGE 4, GFR 15-29 ML/MIN (HCC): Chronic | Status: ACTIVE | Noted: 2022-04-07

## 2022-04-07 LAB
ABO GROUP BLD: NORMAL
ALBUMIN SERPL BCP-MCNC: 3 G/DL (ref 3.5–5)
ALP SERPL-CCNC: 138 U/L (ref 46–116)
ALT SERPL W P-5'-P-CCNC: 62 U/L (ref 12–78)
ANION GAP SERPL CALCULATED.3IONS-SCNC: 8 MMOL/L (ref 4–13)
ANION GAP SERPL CALCULATED.3IONS-SCNC: 8 MMOL/L (ref 4–13)
AST SERPL W P-5'-P-CCNC: 40 U/L (ref 5–45)
BACTERIA UR CULT: ABNORMAL
BACTERIA UR CULT: ABNORMAL
BACTERIA UR QL AUTO: ABNORMAL /HPF
BILIRUB SERPL-MCNC: 0.29 MG/DL (ref 0.2–1)
BILIRUB UR QL STRIP: ABNORMAL
BLD GP AB SCN SERPL QL: NEGATIVE
BUN SERPL-MCNC: 44 MG/DL (ref 5–25)
BUN SERPL-MCNC: 47 MG/DL (ref 5–25)
CALCIUM ALBUM COR SERPL-MCNC: 9.5 MG/DL (ref 8.3–10.1)
CALCIUM SERPL-MCNC: 8.7 MG/DL (ref 8.3–10.1)
CALCIUM SERPL-MCNC: 8.7 MG/DL (ref 8.3–10.1)
CHLORIDE SERPL-SCNC: 104 MMOL/L (ref 100–108)
CHLORIDE SERPL-SCNC: 104 MMOL/L (ref 100–108)
CLARITY UR: ABNORMAL
CO2 SERPL-SCNC: 24 MMOL/L (ref 21–32)
CO2 SERPL-SCNC: 25 MMOL/L (ref 21–32)
COLOR UR: ABNORMAL
CREAT SERPL-MCNC: 3.12 MG/DL (ref 0.6–1.3)
CREAT SERPL-MCNC: 3.28 MG/DL (ref 0.6–1.3)
GFR SERPL CREATININE-BSD FRML MDRD: 16 ML/MIN/1.73SQ M
GFR SERPL CREATININE-BSD FRML MDRD: 18 ML/MIN/1.73SQ M
GLUCOSE SERPL-MCNC: 103 MG/DL (ref 65–140)
GLUCOSE SERPL-MCNC: 134 MG/DL (ref 65–140)
GLUCOSE SERPL-MCNC: 210 MG/DL (ref 65–140)
GLUCOSE SERPL-MCNC: 74 MG/DL (ref 65–140)
GLUCOSE SERPL-MCNC: 85 MG/DL (ref 65–140)
GLUCOSE SERPL-MCNC: 92 MG/DL (ref 65–140)
GLUCOSE SERPL-MCNC: 98 MG/DL (ref 65–140)
GLUCOSE UR STRIP-MCNC: NEGATIVE MG/DL
HCT VFR BLD AUTO: 28.8 % (ref 36.5–49.3)
HGB BLD-MCNC: 9.7 G/DL (ref 12–17)
HGB UR QL STRIP.AUTO: ABNORMAL
KETONES UR STRIP-MCNC: ABNORMAL MG/DL
LEUKOCYTE ESTERASE UR QL STRIP: ABNORMAL
LIPASE SERPL-CCNC: 106 U/L (ref 73–393)
NITRITE UR QL STRIP: POSITIVE
NON-SQ EPI CELLS URNS QL MICRO: ABNORMAL /HPF
PH UR STRIP.AUTO: 6.5 [PH]
POTASSIUM SERPL-SCNC: 4.4 MMOL/L (ref 3.5–5.3)
POTASSIUM SERPL-SCNC: 4.5 MMOL/L (ref 3.5–5.3)
PROT SERPL-MCNC: 6.8 G/DL (ref 6.4–8.2)
PROT UR STRIP-MCNC: >=300 MG/DL
RBC #/AREA URNS AUTO: ABNORMAL /HPF
RH BLD: POSITIVE
SODIUM SERPL-SCNC: 136 MMOL/L (ref 136–145)
SODIUM SERPL-SCNC: 137 MMOL/L (ref 136–145)
SP GR UR STRIP.AUTO: 1.02 (ref 1–1.03)
SPECIMEN EXPIRATION DATE: NORMAL
UROBILINOGEN UR QL STRIP.AUTO: 1 E.U./DL
WBC #/AREA URNS AUTO: ABNORMAL /HPF

## 2022-04-07 PROCEDURE — 82948 REAGENT STRIP/BLOOD GLUCOSE: CPT

## 2022-04-07 PROCEDURE — 85018 HEMOGLOBIN: CPT | Performed by: INTERNAL MEDICINE

## 2022-04-07 PROCEDURE — 99223 1ST HOSP IP/OBS HIGH 75: CPT | Performed by: NURSE PRACTITIONER

## 2022-04-07 PROCEDURE — 36415 COLL VENOUS BLD VENIPUNCTURE: CPT | Performed by: INTERNAL MEDICINE

## 2022-04-07 PROCEDURE — 87186 SC STD MICRODIL/AGAR DIL: CPT | Performed by: PHYSICIAN ASSISTANT

## 2022-04-07 PROCEDURE — 87086 URINE CULTURE/COLONY COUNT: CPT | Performed by: PHYSICIAN ASSISTANT

## 2022-04-07 PROCEDURE — 81001 URINALYSIS AUTO W/SCOPE: CPT | Performed by: PHYSICIAN ASSISTANT

## 2022-04-07 PROCEDURE — 85014 HEMATOCRIT: CPT | Performed by: INTERNAL MEDICINE

## 2022-04-07 PROCEDURE — 87077 CULTURE AEROBIC IDENTIFY: CPT | Performed by: PHYSICIAN ASSISTANT

## 2022-04-07 PROCEDURE — 74176 CT ABD & PELVIS W/O CONTRAST: CPT

## 2022-04-07 PROCEDURE — G1004 CDSM NDSC: HCPCS

## 2022-04-07 PROCEDURE — NC001 PR NO CHARGE: Performed by: PHYSICIAN ASSISTANT

## 2022-04-07 PROCEDURE — 80048 BASIC METABOLIC PNL TOTAL CA: CPT | Performed by: INTERNAL MEDICINE

## 2022-04-07 PROCEDURE — 99222 1ST HOSP IP/OBS MODERATE 55: CPT | Performed by: PHYSICIAN ASSISTANT

## 2022-04-07 RX ORDER — LEVOFLOXACIN 5 MG/ML
500 INJECTION, SOLUTION INTRAVENOUS
Status: DISCONTINUED | OUTPATIENT
Start: 2022-04-09 | End: 2022-04-07

## 2022-04-07 RX ORDER — FERROUS SULFATE 325(65) MG
325 TABLET ORAL
Status: DISCONTINUED | OUTPATIENT
Start: 2022-04-07 | End: 2022-04-14 | Stop reason: HOSPADM

## 2022-04-07 RX ORDER — TAMSULOSIN HYDROCHLORIDE 0.4 MG/1
0.4 CAPSULE ORAL
Status: DISCONTINUED | OUTPATIENT
Start: 2022-04-07 | End: 2022-04-12

## 2022-04-07 RX ORDER — FLUTICASONE PROPIONATE 50 MCG
1 SPRAY, SUSPENSION (ML) NASAL DAILY PRN
Status: DISCONTINUED | OUTPATIENT
Start: 2022-04-07 | End: 2022-04-14 | Stop reason: HOSPADM

## 2022-04-07 RX ORDER — ATROPA BELLADONNA AND OPIUM 16.2; 3 MG/1; MG/1
30 SUPPOSITORY RECTAL EVERY 8 HOURS PRN
Status: DISCONTINUED | OUTPATIENT
Start: 2022-04-07 | End: 2022-04-14 | Stop reason: HOSPADM

## 2022-04-07 RX ORDER — SENNOSIDES 8.6 MG
1 TABLET ORAL DAILY
Status: DISCONTINUED | OUTPATIENT
Start: 2022-04-07 | End: 2022-04-12

## 2022-04-07 RX ORDER — OXYCODONE HYDROCHLORIDE 5 MG/1
5 TABLET ORAL EVERY 6 HOURS PRN
Status: DISCONTINUED | OUTPATIENT
Start: 2022-04-07 | End: 2022-04-14 | Stop reason: HOSPADM

## 2022-04-07 RX ORDER — CALCITRIOL 0.25 UG/1
0.25 CAPSULE, LIQUID FILLED ORAL DAILY
Status: DISCONTINUED | OUTPATIENT
Start: 2022-04-07 | End: 2022-04-14 | Stop reason: HOSPADM

## 2022-04-07 RX ORDER — DULOXETIN HYDROCHLORIDE 20 MG/1
20 CAPSULE, DELAYED RELEASE ORAL DAILY
Status: DISCONTINUED | OUTPATIENT
Start: 2022-04-07 | End: 2022-04-14 | Stop reason: HOSPADM

## 2022-04-07 RX ORDER — OXYBUTYNIN CHLORIDE 10 MG/1
10 TABLET, EXTENDED RELEASE ORAL DAILY
Status: DISCONTINUED | OUTPATIENT
Start: 2022-04-07 | End: 2022-04-14 | Stop reason: HOSPADM

## 2022-04-07 RX ORDER — INSULIN GLARGINE 100 [IU]/ML
10 INJECTION, SOLUTION SUBCUTANEOUS
Status: DISCONTINUED | OUTPATIENT
Start: 2022-04-07 | End: 2022-04-11

## 2022-04-07 RX ORDER — LEVOFLOXACIN 5 MG/ML
500 INJECTION, SOLUTION INTRAVENOUS
Status: DISCONTINUED | OUTPATIENT
Start: 2022-04-07 | End: 2022-04-07

## 2022-04-07 RX ORDER — ACETAMINOPHEN 325 MG/1
650 TABLET ORAL EVERY 6 HOURS PRN
Status: DISCONTINUED | OUTPATIENT
Start: 2022-04-07 | End: 2022-04-14 | Stop reason: HOSPADM

## 2022-04-07 RX ORDER — PANTOPRAZOLE SODIUM 20 MG/1
20 TABLET, DELAYED RELEASE ORAL
Status: DISCONTINUED | OUTPATIENT
Start: 2022-04-07 | End: 2022-04-14 | Stop reason: HOSPADM

## 2022-04-07 RX ORDER — LEVOFLOXACIN 5 MG/ML
750 INJECTION, SOLUTION INTRAVENOUS ONCE
Status: COMPLETED | OUTPATIENT
Start: 2022-04-07 | End: 2022-04-07

## 2022-04-07 RX ORDER — ALPRAZOLAM 0.25 MG/1
0.25 TABLET ORAL 3 TIMES DAILY PRN
Status: DISCONTINUED | OUTPATIENT
Start: 2022-04-07 | End: 2022-04-14 | Stop reason: HOSPADM

## 2022-04-07 RX ORDER — HYDROMORPHONE HCL/PF 1 MG/ML
0.5 SYRINGE (ML) INJECTION EVERY 6 HOURS PRN
Status: DISCONTINUED | OUTPATIENT
Start: 2022-04-07 | End: 2022-04-09

## 2022-04-07 RX ORDER — HYDROMORPHONE HCL/PF 1 MG/ML
1 SYRINGE (ML) INJECTION ONCE
Status: COMPLETED | OUTPATIENT
Start: 2022-04-07 | End: 2022-04-07

## 2022-04-07 RX ORDER — EZETIMIBE 10 MG/1
10 TABLET ORAL
Status: DISCONTINUED | OUTPATIENT
Start: 2022-04-07 | End: 2022-04-14 | Stop reason: HOSPADM

## 2022-04-07 RX ORDER — METOPROLOL SUCCINATE 100 MG/1
100 TABLET, EXTENDED RELEASE ORAL DAILY
Status: DISCONTINUED | OUTPATIENT
Start: 2022-04-07 | End: 2022-04-14 | Stop reason: HOSPADM

## 2022-04-07 RX ORDER — ISOSORBIDE MONONITRATE 30 MG/1
30 TABLET, EXTENDED RELEASE ORAL
Status: DISCONTINUED | OUTPATIENT
Start: 2022-04-07 | End: 2022-04-14 | Stop reason: HOSPADM

## 2022-04-07 RX ADMIN — SERTRALINE HYDROCHLORIDE 50 MG: 50 TABLET ORAL at 08:59

## 2022-04-07 RX ADMIN — BIMATOPROST 1 DROP: 0.1 SOLUTION/ DROPS OPHTHALMIC at 22:38

## 2022-04-07 RX ADMIN — OXYCODONE HYDROCHLORIDE 5 MG: 5 TABLET ORAL at 19:05

## 2022-04-07 RX ADMIN — CEFTRIAXONE SODIUM 1000 MG: 10 INJECTION, POWDER, FOR SOLUTION INTRAVENOUS at 05:00

## 2022-04-07 RX ADMIN — METOPROLOL SUCCINATE 100 MG: 100 TABLET, EXTENDED RELEASE ORAL at 08:58

## 2022-04-07 RX ADMIN — OXYCODONE HYDROCHLORIDE 5 MG: 5 TABLET ORAL at 06:53

## 2022-04-07 RX ADMIN — HYDROMORPHONE HYDROCHLORIDE 1 MG: 1 INJECTION, SOLUTION INTRAMUSCULAR; INTRAVENOUS; SUBCUTANEOUS at 05:32

## 2022-04-07 RX ADMIN — EZETIMIBE 10 MG: 10 TABLET ORAL at 06:53

## 2022-04-07 RX ADMIN — SENNOSIDES 8.6 MG: 8.6 TABLET ORAL at 08:58

## 2022-04-07 RX ADMIN — HYDROMORPHONE HYDROCHLORIDE 0.5 MG: 1 INJECTION, SOLUTION INTRAMUSCULAR; INTRAVENOUS; SUBCUTANEOUS at 10:16

## 2022-04-07 RX ADMIN — HYDROMORPHONE HYDROCHLORIDE 0.5 MG: 1 INJECTION, SOLUTION INTRAMUSCULAR; INTRAVENOUS; SUBCUTANEOUS at 20:49

## 2022-04-07 RX ADMIN — Medication 325 MG: at 07:53

## 2022-04-07 RX ADMIN — LEVOFLOXACIN 750 MG: 5 INJECTION, SOLUTION INTRAVENOUS at 06:09

## 2022-04-07 RX ADMIN — TAMSULOSIN HYDROCHLORIDE 0.4 MG: 0.4 CAPSULE ORAL at 22:37

## 2022-04-07 RX ADMIN — DULOXETINE HYDROCHLORIDE 20 MG: 20 CAPSULE, DELAYED RELEASE PELLETS ORAL at 08:59

## 2022-04-07 RX ADMIN — OXYBUTYNIN 10 MG: 10 TABLET, FILM COATED, EXTENDED RELEASE ORAL at 08:59

## 2022-04-07 RX ADMIN — PANTOPRAZOLE SODIUM 20 MG: 20 TABLET, DELAYED RELEASE ORAL at 06:53

## 2022-04-07 RX ADMIN — B-COMPLEX W/ C & FOLIC ACID TAB 1 TABLET: TAB at 08:57

## 2022-04-07 RX ADMIN — CALCITRIOL CAPSULES 0.25 MCG 0.25 MCG: 0.25 CAPSULE ORAL at 08:59

## 2022-04-07 RX ADMIN — INSULIN GLARGINE 10 UNITS: 100 INJECTION, SOLUTION SUBCUTANEOUS at 22:37

## 2022-04-07 RX ADMIN — ISOSORBIDE MONONITRATE 30 MG: 30 TABLET, EXTENDED RELEASE ORAL at 07:53

## 2022-04-07 NOTE — ASSESSMENT & PLAN NOTE
Lab Results   Component Value Date    EGFR 16 04/06/2022    EGFR 18 03/21/2022    EGFR 19 03/02/2022    CREATININE 3 28 (H) 04/06/2022    CREATININE 3 12 (H) 03/21/2022    CREATININE 2 90 (H) 03/02/2022     · Chronic CKD 4, avoid nephrotoxins   Followed outpatient at [facility] ([facility]) Nephrology

## 2022-04-07 NOTE — ED PROVIDER NOTES
History  Chief Complaint   Patient presents with    Penis / Scrotum Problem     pt states "i am bleeding out of my penis" pt has nephrostomy tubes with no signs of blood in bags    Rectal Pain     pt complains of rectal pain with no signs of bleeding  pt states he is straining due to constipation      Nader James is a 76 y o   male with PMH of bladder cancer, hypertension, hyperlipidemia, anemia, CKD, diabetes mellitus who presents to the emergency department with lower abdominal pain and hematuria  The patient states that over the last 2 days he noticed increased and worsening hematuria  Notes that he does have bilateral nephrostomy tubes in place there is no blood in the nephrostomy tubes just coming out of his urethra  This also had no lower abdominal pain  Patient does not have any associated nausea, vomiting, diarrhea  Patient had normal bowel movement today  Denies any chest pain, shortness of breath, palpitations, dizziness, lightheadedness  Denies any fevers or chills  States he is able to pass a small amount of urine however does not seem like enough  Does note decreased output of nephrostomy tubes as well  History provided by:  Patient   used: No    Blood in Urine  This is a recurrent problem  The current episode started yesterday  The problem has been rapidly worsening since onset  He describes the hematuria as gross hematuria  The hematuria occurs throughout his entire urinary stream  His pain is at a severity of 5/10  The pain is moderate  He describes his urine color as dark red  Irritative symptoms include frequency  Irritative symptoms do not include nocturia or urgency  Obstructive symptoms include a slower stream and a weak stream  Obstructive symptoms do not include dribbling, incomplete emptying, an intermittent stream or straining  Associated symptoms include abdominal pain and dysuria   Pertinent negatives include no chills, facial swelling, fever, flank pain, genital pain, hesitancy, inability to urinate, nausea or vomiting  His past medical history is significant for hypertension  (Bladder cancer)       Prior to Admission Medications   Prescriptions Last Dose Informant Patient Reported? Taking?    ALPRAZolam (XANAX) 0 25 mg tablet  Self No No   Sig: At twice a day as needed for anxiety   Patient not taking: Reported on 3/25/2022    Accu-Chek Softclix Lancets lancets   No No   Sig: Test blood sugar 4 times daily   Azelastine HCl 0 15 % SOLN  Self No Yes   Sig: Inhale 1 spray 2 (two) times a day   Bimatoprost (LUMIGAN OP)  Self Yes Yes   Sig: Apply 1 drop to eye daily at bedtime    DULoxetine (CYMBALTA) 20 mg capsule   No Yes   Sig: Take 1 capsule (20 mg total) by mouth daily   Ferrous Sulfate Dried (Feosol) 200 (65 Fe) MG TABS   No Yes   Sig: Take 65 mg by mouth daily   Insulin Pen Needle 31G X 8 MM MISC  Self No No   Sig: Inject 3 times a day   Lantus SoloStar 100 units/mL injection pen  Self No Yes   Si units qhs   Patient taking differently: Inject 18 Units under the skin daily at bedtime    NovoLOG FlexPen 100 units/mL injection pen  Self No Yes   Sig: 10 units with each meal    Patient taking differently: Inject 10 Units under the skin 3 (three) times a day with meals    acetaminophen (TYLENOL) 325 mg tablet  Self Yes No   Sig: Take 650 mg by mouth every 6 (six) hours as needed for mild pain   aspirin (ECOTRIN LOW STRENGTH) 81 mg EC tablet  Self No Yes   Sig: Take 1 tablet (81 mg total) by mouth daily   calcitriol (ROCALTROL) 0 25 mcg capsule   No Yes   Sig: Take 1 capsule (0 25 mcg total) by mouth daily   ezetimibe (ZETIA) 10 mg tablet   No Yes   Sig: Take 1 tablet (10 mg total) by mouth daily in the early morning   fluocinonide (LIDEX) 0 05 % cream  Self No Yes   Sig: Apply topically 2 (two) times a day   Patient taking differently: Apply topically as needed    fluticasone (FLONASE) 50 mcg/act nasal spray  Self No Yes   Si spray into each nostril daily   Patient taking differently: 1 spray into each nostril as needed    furosemide (LASIX) 20 mg tablet  Self No Yes   Sig: TAKE 1 TABLET BY MOUTH AS NEEDED FOR EDEMA   gabapentin (Neurontin) 300 mg capsule   No Yes   Sig: Take 1 capsule (300 mg total) by mouth daily at bedtime   glucose blood (Accu-Chek Martha Plus) test strip   No No   Sig: Test blood sugar 4 times daily   isosorbide mononitrate (IMDUR) 30 mg 24 hr tablet   No Yes   Sig: Take 1 tablet (30 mg total) by mouth daily in the early morning   lidocaine (XYLOCAINE) 2 % topical gel   No Yes   Sig: Apply topically as needed for mild pain   lidocaine-prilocaine (EMLA) cream  Self No Yes   Sig: Apply topically as needed for mild pain   loperamide (IMODIUM) 2 mg capsule  Self Yes Yes   Sig: Take 2 mg by mouth 4 (four) times a day as needed for diarrhea   metoprolol succinate (TOPROL-XL) 100 mg 24 hr tablet   No Yes   Sig: Take 1 tablet (100 mg total) by mouth daily for 7 days   multivitamin (THERAGRAN) TABS  Self Yes Yes   Sig: Take 1 tablet by mouth daily  naloxone (NARCAN) 4 mg/0 1 mL nasal spray  Self No No   Sig: Administer 1 spray into a nostril  If no response after 2-3 minutes, give another dose in the other nostril using a new spray     Patient not taking: Reported on 3/25/2022    omeprazole (PriLOSEC) 20 mg delayed release capsule  Self Yes Yes   Sig: Take 20 mg by mouth daily in the early morning PRN    oxyCODONE (Roxicodone) 5 immediate release tablet   No Yes   Sig: Take 1-2 tablets (5-10 mg total) by mouth every 4 (four) hours as needed for moderate pain (5 mg moderate, 10 mg severe) Max Daily Amount: 60 mg   oxybutynin (DITROPAN-XL) 10 MG 24 hr tablet   No Yes   Sig: Take 1 tablet (10 mg total) by mouth daily   senna (SENOKOT) 8 6 MG tablet   No Yes   Sig: Take 1 tablet (8 6 mg total) by mouth daily   sertraline (ZOLOFT) 50 mg tablet   No Yes   Sig: Take 1 tablet (50 mg total) by mouth daily   sodium chloride, PF, 0 9 %   No No   Sig: 10 mL by Intracatheter route daily Intracatheter flushing daily   sodium chloride, PF, 0 9 %   No No   Sig: 10 mL by Intracatheter route daily Intracatheter flushing daily   tamsulosin (FLOMAX) 0 4 mg   No Yes   Sig: Take 1 capsule (0 4 mg total) by mouth daily at bedtime      Facility-Administered Medications: None       Past Medical History:   Diagnosis Date    Anemia     Last assessed: 9/28/17    Anxiety     Arteriosclerotic cardiovascular disease     Last assessed: 9/28/17    Arthritis     Bladder cancer ([facility] [address] )     bladder- had BCG treatments    Chronic kidney disease     Stage IV    CKD (chronic kidney disease) stage 4, GFR 15-29 ml/min ([facility])     Colon polyp     Coronary artery disease     7 stents    Depression     Diabetes mellitus ([facility])     IDDM    GERD (gastroesophageal reflux disease)     Glaucoma     Hematuria     History of fusion of cervical spine     Hyperlipidemia     Hypertension     Insomnia     Last assessed: 11/14/12    Loss of hearing     has hearing aids but usually does not wear them    Other seasonal allergic rhinitis     Last assessed: 2/10/16    PAD (peripheral artery disease) ([facility])     Shortness of breath     on exertion    Spinal stenosis of lumbar region     Transient cerebral ischemia     No Residual    Uses walker     w/c for longer distances       Past Surgical History:   Procedure Laterality Date    CARDIAC SURGERY      Cath stent placement  Last assessed: 3/9/17  Interventional Catheterization    CHOLECYSTECTOMY      COLONOSCOPY      CYSTOSCOPY      Diagnostic w/biopsy  Cathy Petersonba   Last assessed: 12/1/14    CYSTOSCOPY W/ RETROGRADES Right 3/1/2022    Procedure: CYSTO; stent removal retrograde;  Surgeon: Shelia Damon MD;  [facility];  Service: Urology    CYSTOSCOPY W/ URETERAL STENT PLACEMENT Bilateral 10/18/2021    Procedure: bilateral retrogrades, cytology collection;  Surgeon: Shelia Damon MD;  [facility];  Service: Urology    CYSTOURETHROSCOPY      w/cautery  [facility]    FL RETROGRADE PYELOGRAM  10/18/2021    FL RETROGRADE PYELOGRAM  10/24/2021    GASTRIC BYPASS      For morbid obesity w/Shaji-en-Y   Resolved: 11/17/09    INCISION AND DRAINAGE OF WOUND Right 2/26/2017    Procedure: INCISION AND DRAINAGE (I&D) EXTREMITY WITH APPLICATION OF GRAFT JACKET;  Surgeon: Karla Azar DPM;  [facility];  Service:     INCISION AND DRAINAGE OF WOUND Right 4/25/2017    Procedure: INCISION AND DRAINAGE (I&D) EXTREMITY, APPLICATION OF GRAFT;  Surgeon: Karla Azar DPM;  [facility];  Service:     IR BIOPSY OTHER  7/2/2020    IR LOWER EXTREMITY ANGIOGRAM  2/8/2021    IR LOWER EXTREMITY ANGIOGRAM  2/11/2021    IR NEPHROSTOMY TUBE PLACEMENT  2/25/2022    IR PORT PLACEMENT  1/17/2022    IR TUNNELED CENTRAL LINE PLACEMENT  12/24/2020    JOINT REPLACEMENT      christofer knees replaced    DE AMPUTATION METATARSAL+TOE,SINGLE Left 12/21/2020    Procedure: RAY RESECTION FOOT;  Surgeon: Lizz Chin DPM;  [facility];  Service: Podiatry    DE AMPUTATION METATARSAL+TOE,SINGLE Left 12/31/2020    Procedure: 5TH MET RESECTION;  Surgeon: Lizz Chin DPM;  [facility];  Service: Podiatry    DE CYSTOURETHROSCOPY W/IRRIG & EVAC CLOTS N/A 2/10/2021    Procedure: CYSTOSCOPY EVACUATION OF CLOTS, fulguration;  Surgeon: Osorio Mcclelland MD;  [facility];  Service: Urology    DE CYSTOURETHROSCOPY W/IRRIG & EVAC CLOTS N/A 10/24/2021    Procedure: CYSTOSCOPY EVACUATION OF CLOT, fulguration of bleeding vessels, right ureter stent placement, retrograde pyelogram;  Surgeon: Mimi Cote MD;  [facility];  Service: Urology    DE CYSTOURETHROSCOPY,BIOPSY N/A 8/16/2016    Procedure: CYSTOSCOPY WITH BIOPSIES;  Surgeon: Sylvester Luna MD;  [facility];  Service: Urology    DE CYSTOURETHROSCOPY,FULGUR <0 5 CM LESN N/A 11/19/2020    Procedure: CYSTO W/BIOPSIES, transurethral prostate bx;  Surgeon: Jordi Rubio MD;  [facility];  Service: Urology    [place] CYSTOURETHROSCOPY,FULGUR >5 CM LESN Bilateral 10/18/2021    Procedure: TRANSURETHRAL RESECTION OF BLADDER TUMOR (TURBT); Surgeon: Matthew Kent MD;  [facility];  Service: Urology    [place] [facility] 3RD+ ORD [address] PEL/LXTR [facility] Left 2021    Procedure: LEG angiogram, CO2 w/limited contrast with balloon angioplasty postertior tibial artery;  Surgeon: Martha Klein MD;  [facility];  Service: Vascular    ROTATOR CUFF REPAIR      SMALL INTESTINE SURGERY      Surgery Shaji-en-Y    SPINAL FUSION      lumbar and cervical fusions    VAC DRESSING APPLICATION Right     Procedure: APPLICATION VAC DRESSING;  Surgeon: Dee Kebede DPM;  [facility];  Service:    [address] [place] [address] Left 2021    Procedure: FOOT DEBRIDE, [address] OUT w/graft application;  Surgeon: Dee Kebede DPM;  [facility];  Service: Podiatry       Family History   Problem Relation Age of Onset    Diabetes Mother     Heart disease Mother     Other Mother         High blood pressure    Heart disease Father     Diabetes Sister     Other Sister         High blood pressure    Kidney disease Sister     Heart disease Brother     Other Brother         High blood pressure     I have reviewed and agree with the history as documented      E-Cigarette/Vaping    E-Cigarette Use Never User      E-Cigarette/Vaping Substances    Nicotine No     THC No     CBD No     Flavoring No     Other No     Unknown No      Social History     Tobacco Use    Smoking status: Former Smoker     Packs/day: 3 00     Years: 27 00     Pack years: 81 00     Types: Cigarettes     Quit date:      Years since quittin 2    Smokeless tobacco: Never Used   Vaping Use    Vaping Use: Never used   Substance Use Topics    Alcohol use: Never     Comment: beer / liquor    Drug use: Not Currently     Types: Marijuana Comment: quit 2019 had medical marijuana       Review of Systems   Constitutional: Negative for activity change, appetite change, chills, fever and unexpected weight change  HENT: Negative for congestion, ear discharge, facial swelling, postnasal drip, rhinorrhea, sinus pressure, sinus pain and sore throat  Respiratory: Negative for apnea, cough, choking, chest tightness, shortness of breath, wheezing and stridor  Cardiovascular: Negative for chest pain, palpitations and leg swelling  Gastrointestinal: Positive for abdominal pain  Negative for blood in stool, constipation, diarrhea, nausea and vomiting  Genitourinary: Positive for dysuria, frequency and hematuria  Negative for flank pain, hesitancy, incomplete emptying, nocturia and urgency  Musculoskeletal: Negative for arthralgias, myalgias and neck stiffness  Skin: Negative for color change, pallor, rash and wound  Neurological: Negative for dizziness, tremors, seizures, syncope, light-headedness, numbness and headaches  All other systems reviewed and are negative  Physical Exam  Physical Exam  Vitals and nursing note reviewed  Constitutional:       General: He is not in acute distress  Appearance: Normal appearance  He is normal weight  He is not ill-appearing or toxic-appearing  HENT:      Head: Normocephalic and atraumatic  Nose: Nose normal       Mouth/Throat:      Mouth: Mucous membranes are moist       Pharynx: Oropharynx is clear  No oropharyngeal exudate  Eyes:      Pupils: Pupils are equal, round, and reactive to light  Cardiovascular:      Rate and Rhythm: Normal rate and regular rhythm  Pulses: Normal pulses  Heart sounds: Normal heart sounds  No murmur heard  No friction rub  No gallop  Pulmonary:      Effort: Pulmonary effort is normal  No respiratory distress  Breath sounds: Normal breath sounds  No stridor  No wheezing, rhonchi or rales  Abdominal:      General: Abdomen is flat  Bowel sounds are normal  There is no distension  Palpations: Abdomen is soft  There is no mass  Tenderness: There is abdominal tenderness in the right lower quadrant and left lower quadrant  There is no guarding or rebound  Negative signs include Morgan's sign, Rovsing's sign, McBurney's sign, psoas sign and obturator sign  Hernia: No hernia is present  Musculoskeletal:         General: No swelling, tenderness or deformity  Normal range of motion  Cervical back: Normal range of motion  No rigidity or tenderness  Skin:     General: Skin is warm and dry  Capillary Refill: Capillary refill takes less than 2 seconds  Neurological:      General: No focal deficit present  Mental Status: He is alert and oriented to person, place, and time  Mental status is at baseline  Cranial Nerves: No cranial nerve deficit  Sensory: No sensory deficit  Motor: No weakness           Vital Signs  ED Triage Vitals   Temperature Pulse Respirations Blood Pressure SpO2   04/06/22 2230 04/06/22 2229 04/06/22 2229 04/06/22 2229 04/06/22 2229   98 4 °F (36 9 °C) 71 18 135/71 100 %      Temp src Heart Rate Source Patient Position - Orthostatic VS BP Location FiO2 (%)   -- 04/06/22 2229 04/06/22 2229 04/06/22 2229 --    Monitor Sitting Right arm       Pain Score       04/06/22 2345       8           Vitals:    04/06/22 2229 04/07/22 0045 04/07/22 0330   BP: 135/71 133/79 118/65   Pulse: 71 72 75   Patient Position - Orthostatic VS: Sitting Lying          Visual Acuity      ED Medications  Medications   levofloxacin (LEVAQUIN) IVPB (premix in dextrose) 750 mg 150 mL (has no administration in time range)   insulin lispro (HumaLOG) 100 units/mL subcutaneous injection 1-6 Units (1 Units Subcutaneous Not Given 4/7/22 0557)   oxyCODONE (ROXICODONE) IR tablet 5 mg (has no administration in time range)   HYDROmorphone (DILAUDID) injection 0 5 mg (has no administration in time range)   acetaminophen (TYLENOL) tablet 650 mg (has no administration in time range)   levofloxacin (LEVAQUIN) IVPB (premix in dextrose) 500 mg 100 mL (has no administration in time range)   HYDROmorphone (DILAUDID) injection 1 mg (1 mg Intravenous Given 4/6/22 2345)   ceftriaxone (ROCEPHIN) 1 g/50 mL in dextrose IVPB (0 mg Intravenous Stopped 4/7/22 0530)   HYDROmorphone (DILAUDID) injection 1 mg (1 mg Intravenous Given 4/7/22 0532)       Diagnostic Studies  Results Reviewed     Procedure Component Value Units Date/Time    Fingerstick Glucose (POCT) [[id number]]  (Normal) Collected: 04/07/22 0552    Lab Status: Final result Updated: 04/07/22 0553     POC Glucose 74 mg/dl     Urine Microscopic [[id number]]  (Abnormal) Collected: 04/07/22 0057    Lab Status: Final result Specimen: Urine, Clean Catch Updated: 04/07/22 0118     RBC, UA Innumerable /hpf      WBC, UA Innumerable /hpf      Epithelial Cells Occasional /hpf      Bacteria, UA Occasional /hpf     Urine culture [[id number]] Collected: 04/07/22 0057    Lab Status:  In process Specimen: Urine, Clean Catch Updated: 04/07/22 0118    UA w Reflex to Microscopic w Reflex to Culture [[id number]]  (Abnormal) Collected: 04/07/22 0057    Lab Status: Final result Specimen: Urine, Clean Catch Updated: 04/07/22 0103     Color, UA Red     Clarity, UA Cloudy     Specific [place], UA 1 020     pH, UA 6 5     Leukocytes, UA Large     Nitrite, UA Positive     Protein, UA >=300 mg/dl      Glucose, UA Negative mg/dl      Ketones, UA Trace mg/dl      Urobilinogen, UA 1 0 E U /dl      Bilirubin, UA Interference- unable to analyze     Blood, UA Large    Comprehensive metabolic panel [[id number]]  (Abnormal) Collected: 04/06/22 2336    Lab Status: Final result Specimen: Blood from Line, Venous Updated: 04/07/22 0001     Sodium 136 mmol/L      Potassium 4 4 mmol/L      Chloride 104 mmol/L      CO2 24 mmol/L      ANION GAP 8 mmol/L      BUN 47 mg/dL      Creatinine 3 28 mg/dL      Glucose 134 mg/dL      Calcium 8 7 mg/dL      Corrected Calcium 9 5 mg/dL      AST 40 U/L      ALT 62 U/L      Alkaline Phosphatase 138 U/L      Total Protein 6 8 g/dL      Albumin 3 0 g/dL      Total Bilirubin 0 29 mg/dL      eGFR 16 ml/min/1 73sq m     Narrative:      National Kidney Disease Foundation guidelines for Chronic Kidney Disease (CKD):     Stage 1 with normal or high GFR (GFR > 90 mL/min/1 73 square meters)    Stage 2 Mild CKD (GFR = 60-89 mL/min/1 73 square meters)    Stage 3A Moderate CKD (GFR = 45-59 mL/min/1 73 square meters)    Stage 3B Moderate CKD (GFR = 30-44 mL/min/1 73 square meters)    Stage 4 Severe CKD (GFR = 15-29 mL/min/1 73 square meters)    Stage 5 End Stage CKD (GFR <15 mL/min/1 73 square meters)  Note: GFR calculation is accurate only with a steady state creatinine    Lipase [[id number]]  (Normal) Collected: 04/06/22 2336    Lab Status: Final result Specimen: Blood from Line, Venous Updated: 04/07/22 0001     Lipase 106 u/L     Protime-INR [[id number]]  (Normal) Collected: 04/06/22 2336    Lab Status: Final result Specimen: Blood from Line, Venous Updated: 04/06/22 2356     Protime 13 6 seconds      INR 1 07    APTT [[id number]]  (Normal) Collected: 04/06/22 2336    Lab Status: Final result Specimen: Blood from Line, Venous Updated: 04/06/22 2356     PTT 30 seconds     CBC and differential [[id number]]  (Abnormal) Collected: 04/06/22 2336    Lab Status: Final result Specimen: Blood from Line, Venous Updated: 04/06/22 2347     WBC 5 61 Thousand/uL      RBC 2 85 Million/uL      Hemoglobin 9 2 g/dL      Hematocrit 27 1 %      MCV 95 fL      MCH 32 3 pg      MCHC 33 9 g/dL      RDW 14 7 %      MPV 9 9 fL      Platelets 898 Thousands/uL      nRBC 0 /100 WBCs      Neutrophils Relative 64 %      Immat GRANS % 0 %      Lymphocytes Relative 25 %      Monocytes Relative 9 %      Eosinophils Relative 2 %      Basophils Relative 0 %      Neutrophils Absolute 3 55 Thousands/µL      Immature Grans Absolute 0 02 Thousand/uL Lymphocytes Absolute 1 40 Thousands/µL      Monocytes Absolute 0 50 Thousand/µL      Eosinophils Absolute 0 12 Thousand/µL      Basophils Absolute 0 02 Thousands/µL                  CT abdomen pelvis wo contrast   Final Result by Pennie Mays MD (04/07 0326)      1  Interval placement of bilateral percutaneous nephrostomy tubes in place  No hydronephrosis  2   Persistent diffuse wall thickening of the urinary bladder with surrounding fat stranding likely representing known malignancy with cystitis  Workstation performed: [id number]                    Procedures  Procedures         ED Course                               SBIRT 20yo+      Most Recent Value   SBIRT (24 yo +)    In order to provide better care to our patients, we are screening all of our patients for alcohol and drug use  Would it be okay to ask you these screening questions? No Filed at: 04/07/2022 0007                    MDM  Number of Diagnoses or Management Options  Gross hematuria: new and requires workup  Urinary tract infection: new and requires workup  Diagnosis management comments: Patient was seen and examined  in the emergency department for chief complaint of lower abdominal pain and hematuria  The patient presented worsening symptoms over last 2 days  Patient does have a known history of bladder cancer being treated with chemotherapy as well as radiation  Patient states he does have intermittent hematuria however today it is gross and persistent  Patient also does note some lower abdominal pain and some dysuria  He denies any chest pain, shortness of breath, palpitations, difficulty having a bowel movement or testicular pain  He denies any flank pain  On exam patient is in no acute distress, lungs are clear, regular rate rhythm, no murmurs rubs or gallops  Patient has minimal tenderness in lower abdomen  No rashes  Patent meatus    No testicular pain or scrotal swelling      DDx including but not limited to: UTI, hemorrhagic cystitis, coagulopathy, anemia, renal colic, pyelonephritis, tumor, urinary retention, recent instrumentation  Workup: Will obtain CBC, CMP, lipase, urinalysis, CT abdomen pelvis with, bladder scan  Bladder scan without evidence of attention  CBC with chronic stable anemia  CMP with elevated creatinine, slightly elevated from baseline  Urinalysis with gross heme as well as nitrate leuks positive  Per urology 9974: Admit to [facility] and we can evaluate in the next few hours  Hopefully things can clear up and we can send him home within the next 24 to 48 hours  Given hx of radiation let's start CBI and then we can reeval and titrate off  Manual irrigation in between  Treat UTI  Patient updated on results and need for admission  Disposition:  General impression 77-year-old male with history of bladder cancer with gross hematuria and evidence of urinary tract infection today  Will treat urinary tract infection with Rocephin as well as CBI for hematuria  Will admit to [facility]  Following urology recommendations at this point  The patient was discharged in stable condition  Patient ambulated off the department  Extensive return to emergency department precautions were discussed  Follow up with appropriate providers including primary care physician was discussed  Patient and/or their  primary decision maker expressed understanding  Patient remained stable during entire emergency department stay           Amount and/or Complexity of Data Reviewed  Clinical lab tests: ordered and reviewed  Tests in the radiology section of CPT®: ordered and reviewed  Tests in the medicine section of CPT®: ordered and reviewed  Review and summarize past medical records: yes  Discuss the patient with other providers: yes ([facility] and Neurology)  Independent visualization of images, tracings, or specimens: yes    Risk of Complications, Morbidity, and/or Mortality  Presenting problems: moderate  Diagnostic procedures: moderate  Management options: moderate    Patient Progress  Patient progress: stable      Disposition  Final diagnoses:   Gross hematuria   Urinary tract infection     Time reflects when diagnosis was documented in both MDM as applicable and the Disposition within this note     Time User Action Codes Description Comment    4/7/2022  4:08 AM Zackery Wadsworth Add [R31 0] Gross hematuria     4/7/2022  4:10 AM Zackery Wadsworth Add [N39 0] Urinary tract infection       ED Disposition     ED Disposition Condition Date/Time Comment    Admit Stable Thu Apr 7, 2022  4:14 AM Case was discussed with luz maria and the patient's admission status was agreed to be Admission Status: inpatient status to the service of Dr Darnell Cedeño   Follow-up Information    None         Patient's Medications   Discharge Prescriptions    No medications on file       No discharge procedures on file      PDMP Review       Value Time User    PDMP Reviewed  Yes 4/7/2022  4:42 AM Leon Coronado          ED Provider  Electronically Signed by           Gill Tapia PA-C  04/07/22 4205

## 2022-04-07 NOTE — ED NOTES
Patient manually irrigated with 50ml x8  Total return of 400ml        Claudine Connors, NOLA  04/07/22 6617

## 2022-04-07 NOTE — ASSESSMENT & PLAN NOTE
· Known bladder carcinoma followed outpatient by Urology and Oncology  · S/p TURBT Oct 2021    Treated with RT and systemic chemotherapy; completed treatment last week

## 2022-04-07 NOTE — PLAN OF CARE
Problem: Potential for Falls  Goal: Patient will remain free of falls  Description: INTERVENTIONS:  - Educate patient/family on patient safety including physical limitations  - Instruct patient to call for assistance with activity   - Consult OT/PT to assist with strengthening/mobility   - Keep Call bell within reach  - Keep bed low and locked with side rails adjusted as appropriate  - Keep care items and personal belongings within reach  - Initiate and maintain comfort rounds  - Make Fall Risk Sign visible to staff  - Offer Toileting every 2 Hours, in advance of need  - Initiate/Maintain alarm  - Obtain necessary fall risk management equipment:   - Apply yellow socks and bracelet for high fall risk patients  - Consider moving patient to room near nurses station  Outcome: Progressing     Problem: MOBILITY - ADULT  Goal: Maintain or return to baseline ADL function  Description: INTERVENTIONS:  -  Assess patient's ability to carry out ADLs; assess patient's baseline for ADL function and identify physical deficits which impact ability to perform ADLs (bathing, care of mouth/teeth, toileting, grooming, dressing, etc )  - Assess/evaluate cause of self-care deficits   - Assess range of motion  - Assess patient's mobility; develop plan if impaired  - Assess patient's need for assistive devices and provide as appropriate  - Encourage maximum independence but intervene and supervise when necessary  - Involve family in performance of ADLs  - Assess for home care needs following discharge   - Consider OT consult to assist with ADL evaluation and planning for discharge  - Provide patient education as appropriate  Outcome: Progressing  Goal: Maintains/Returns to pre admission functional level  Description: INTERVENTIONS:  - Perform BMAT or MOVE assessment daily    - Set and communicate daily mobility goal to care team and patient/family/caregiver     - Collaborate with rehabilitation services on mobility goals if consulted  - Perform Range of Motion 3 times a day  - Reposition patient every 2 hours    - Dangle patient 3 times a day  - Stand patient 3 times a day  - Ambulate patient 3 times a day  - Out of bed to chair 3 times a day   - Out of bed for meals 3 times a day  - Out of bed for toileting  - Record patient progress and toleration of activity level   Outcome: Progressing     Problem: GENITOURINARY - ADULT  Goal: Maintains or returns to baseline urinary function  Description: INTERVENTIONS:  - Assess urinary function  - Encourage oral fluids to ensure adequate hydration if ordered  - Administer IV fluids as ordered to ensure adequate hydration  - Administer ordered medications as needed  - Offer frequent toileting  - Follow urinary retention protocol if ordered  Outcome: Progressing  Goal: Absence of urinary retention  Description: INTERVENTIONS:  - Assess patients ability to void and empty bladder  - Monitor I/O  - Bladder scan as needed  - Discuss with physician/AP medications to alleviate retention as needed  - Discuss catheterization for long term situations as appropriate  Outcome: Progressing  Goal: Urinary catheter remains patent  Description: INTERVENTIONS:  - Assess patency of urinary catheter  - If patient has a chronic wagner, consider changing catheter if non-functioning  - Follow guidelines for intermittent irrigation of non-functioning urinary catheter  Outcome: Progressing     Problem: METABOLIC, FLUID AND ELECTROLYTES - ADULT  Goal: Electrolytes maintained within normal limits  Description: INTERVENTIONS:  - Monitor labs and assess patient for signs and symptoms of electrolyte imbalances  - Administer electrolyte replacement as ordered  - Monitor response to electrolyte replacements, including repeat lab results as appropriate  - Instruct patient on fluid and nutrition as appropriate  Outcome: Progressing  Goal: Fluid balance maintained  Description: INTERVENTIONS:  - Monitor labs   - Monitor I/O and WT  - Instruct patient on fluid and nutrition as appropriate  - Assess for signs & symptoms of volume excess or deficit  Outcome: Progressing  Goal: Glucose maintained within target range  Description: INTERVENTIONS:  - Monitor Blood Glucose as ordered  - Assess for signs and symptoms of hyperglycemia and hypoglycemia  - Administer ordered medications to maintain glucose within target range  - Assess nutritional intake and initiate nutrition service referral as needed  Outcome: Progressing     Problem: HEMATOLOGIC - ADULT  Goal: Maintains hematologic stability  Description: INTERVENTIONS  - Assess for signs and symptoms of bleeding or hemorrhage  - Monitor labs  - Administer supportive blood products/factors as ordered and appropriate  Outcome: Progressing

## 2022-04-07 NOTE — ED NOTES
Patient reporting pain around penis  CBI noted to stop flowing  Patient irrigated with 3 large clots removed  CBI now flowing properly at this time       Rosalba Bautista RN  04/07/22 8904

## 2022-04-07 NOTE — ED NOTES
Assumed care of patient at this time  Patient resting comfortably in bed  Breathing equal and unlabored  Updated on plan of care  Patient with no further questions at this time       Thao Weathers RN  04/07/22 2189

## 2022-04-07 NOTE — H&P
Mian 98 1945, 76 y o  male MRN: [id number]  Unit/Bed#: ED 19 Encounter: [id number]  Primary Care Provider: Makenzie Bowden MD   Date and time admitted to hospital: 4/6/2022 10:30 PM    * Acute cystitis with hematuria  Assessment & Plan  · Presents with hematuria, hx bladder cancer and gross hematuria s/p TURBT, has hematuria since his procedure  · CT A/P: Interval placement of bilateral percutaneous nephrostomy tubes in place  No hydronephrosis  Persistent diffuse wall thickening of the urinary bladder with surrounding fat stranding likely representing known malignancy with cystitis  · UA red and cloudy, large leuks and nitrites    · ED discussed with urology recommended 3 way Shirley catheter and CBI  · Nephrostomy tubes present, per Urology flush nephrostomy tubes 10ml 2 to 3 times daily  · For complicated UTI will treat with IV Levaquin; renally dosed Levofloxacin 750mg x1, followed by levofloxacin 500mg q48h  Previous urine cultures grew E faecalis susceptible to levofloxacin  · Monitor intake and output  · Manual irrigation p r n  · Follow-up urine culture  · Urology consult    CKD (chronic kidney disease) stage 4, GFR 15-29 ml/min [facility])  Assessment & Plan  Lab Results   Component Value Date    EGFR 16 04/06/2022    EGFR 18 03/21/2022    EGFR 19 03/02/2022    CREATININE 3 28 (H) 04/06/2022    CREATININE 3 12 (H) 03/21/2022    CREATININE 2 90 (H) 03/02/2022     · Chronic CKD 4, avoid nephrotoxins  Followed outpatient at [facility] Nephrology    Malignant neoplasm of anterior wall of urinary bladder [facility])  Assessment & Plan  · Known bladder carcinoma followed outpatient by Urology and Oncology  · S/p TURBT Oct 2021    Treated with RT and systemic chemotherapy; completed treatment last week    Coronary artery disease of native artery of native heart with stable angina pectoris ([facility] [address] )  Assessment & Plan  · CAD s/p stenting, maintained on ASA, Imdur, Toprol and zetia  Hold aspirin in setting of hematuria  · Followed outpatient by cardiology at [facility]    Continuous opioid dependence [facility])  Assessment & Plan  · Hydromorphone 2 mg verified on PDMP  · Seen by palliative on March 25th and ordered oxycodone 5mg for mod pain, oxycodone 10 mg for severe pain q 4h p r n  PAD (peripheral artery disease) ([facility])  Assessment & Plan  · Hx of PAD with nonhealing left foot amputation s/p left lower extremity angiogram x 2 with successful posterior tibial atherectomy and angioplasty and subsequent revision of left foot amputation site  Followed outpatient by vascular surgery  Maintained on ASA and zetia    Type 2 diabetes mellitus, with long-term current use of insulin [facility])  Assessment & Plan  Lab Results   Component Value Date    HGBA1C 7 7 (H) 09/03/2021     · Maintained outpatient on NovoLog and Lantus, decrease while inpatient  Add sliding scale insulin  · ADA diet    No results for input(s): POCGLU in the last 72 hours  Blood Sugar Average: Last 72 hrs:      Hypertension with renal disease  Assessment & Plan  · Continue metoprolol    VTE Prophylaxis: Other Medication: Gross hematuria  / sequential compression device   Code Status: FC  POLST: POLST is not applicable to this patient  Discussion with family:     Anticipated Length of Stay:  Patient will be admitted on an Inpatient basis with an anticipated length of stay of  > 2 midnights  Justification for Hospital Stay: gross hematuria requiring CBI in setting of bladder malignancy, complicated UTI    Total Time for Visit, including Counseling / Coordination of Care: 60 minutes  Greater than 50% of this total time spent on direct patient counseling and coordination of care  Chief Complaint:   "bleeding and pain with urine"    History of Present Illness:    Messi Handley is a 76 y o  male who presents with c/o dysuria and hematuria since last Wednesday    History of bladder cancer status post TURP, has recurrent admissions for hematuria since his procedure  States he called Urology last week and was advised he go to ED; he held off hoping his symptoms would improve but pain and hematuria became worse prompting his visit to ED tonight  Denies fever or chills  Review of Systems:    Review of Systems   Constitutional: Negative  HENT: Negative  Respiratory: Negative  Cardiovascular: Negative  Gastrointestinal: Negative  Genitourinary: Positive for dysuria and hematuria  Musculoskeletal: Negative  Skin: Negative  Neurological: Negative  Psychiatric/Behavioral: Negative  Past Medical and Surgical History:     Past Medical History:   Diagnosis Date    Anemia     Last assessed: 9/28/17    Anxiety     Arteriosclerotic cardiovascular disease     Last assessed: 9/28/17    Arthritis     Bladder cancer ([facility] [address] )     bladder- had BCG treatments    Chronic kidney disease     Stage IV    CKD (chronic kidney disease) stage 4, GFR 15-29 ml/min ([facility])     Colon polyp     Coronary artery disease     7 stents    Depression     Diabetes mellitus ([facility])     IDDM    GERD (gastroesophageal reflux disease)     Glaucoma     Hematuria     History of fusion of cervical spine     Hyperlipidemia     Hypertension     Insomnia     Last assessed: 11/14/12    Loss of hearing     has hearing aids but usually does not wear them    Other seasonal allergic rhinitis     Last assessed: 2/10/16    PAD (peripheral artery disease) ([facility])     Shortness of breath     on exertion    Spinal stenosis of lumbar region     Transient cerebral ischemia     No Residual    Uses walker     w/c for longer distances       Past Surgical History:   Procedure Laterality Date    CARDIAC SURGERY      Cath stent placement  Last assessed: 3/9/17  Interventional Catheterization    CHOLECYSTECTOMY      COLONOSCOPY      CYSTOSCOPY      Diagnostic w/biopsy  Isabel Gastelum   Last assessed: 12/1/14    CYSTOSCOPY W/ RETROGRADES Right 3/1/2022    Procedure: CYSTO; stent removal retrograde;  Surgeon: Matthew Kent MD;  [facility];  Service: Urology    CYSTOSCOPY W/ URETERAL STENT PLACEMENT Bilateral 10/18/2021    Procedure: bilateral retrogrades, cytology collection;  Surgeon: Matthew Kent MD;  [facility];  Service: Urology    CYSTOURETHROSCOPY      w/cautery  Tommy Villalobos    FL RETROGRADE PYELOGRAM  10/18/2021    FL RETROGRADE PYELOGRAM  10/24/2021    GASTRIC BYPASS      For morbid obesity w/Shaji-en-Y   Resolved: 11/17/09    INCISION AND DRAINAGE OF WOUND Right 2/26/2017    Procedure: INCISION AND DRAINAGE (I&D) EXTREMITY WITH APPLICATION OF GRAFT JACKET;  Surgeon: Dee Kebede DPM;  [facility];  Service:     INCISION AND DRAINAGE OF WOUND Right 4/25/2017    Procedure: INCISION AND DRAINAGE (I&D) EXTREMITY, APPLICATION OF GRAFT;  Surgeon: Dee Kebede DPM;  [facility];  Service:     IR BIOPSY OTHER  7/2/2020    IR LOWER EXTREMITY ANGIOGRAM  2/8/2021    IR LOWER EXTREMITY ANGIOGRAM  2/11/2021    IR NEPHROSTOMY TUBE PLACEMENT  2/25/2022    IR PORT PLACEMENT  1/17/2022    IR TUNNELED CENTRAL LINE PLACEMENT  12/24/2020    JOINT REPLACEMENT      christofer knees replaced    KS AMPUTATION METATARSAL+TOE,SINGLE Left 12/21/2020    Procedure: RAY RESECTION FOOT;  Surgeon: Tamra Leroy DPM;  [facility];  Service: Podiatry    KS AMPUTATION METATARSAL+TOE,SINGLE Left 12/31/2020    Procedure: 5TH MET RESECTION;  Surgeon: Tamra Leroy DPM;  [facility];  Service: Podiatry    KS CYSTOURETHROSCOPY W/IRRIG & EVAC CLOTS N/A 2/10/2021    Procedure: CYSTOSCOPY EVACUATION OF CLOTS, fulguration;  Surgeon: Rema Doss MD;  [facility];  Service: Urology    KS CYSTOURETHROSCOPY W/IRRIG & EVAC CLOTS N/A 10/24/2021    Procedure: CYSTOSCOPY EVACUATION OF CLOT, fulguration of bleeding vessels, right ureter stent placement, retrograde pyelogram;  Surgeon: Raquel Foster MD Shweta;  [facility];  Service: Urology    VA CYSTOURETHROSCOPY,BIOPSY N/A 8/16/2016    Procedure: CYSTOSCOPY WITH BIOPSIES;  Surgeon: Va Wilkins MD;  [facility];  Service: Urology    VA CYSTOURETHROSCOPY,FULGUR <0 5 CM LESN N/A 11/19/2020    Procedure: CYSTO W/BIOPSIES, transurethral prostate bx;  Surgeon: Katherin Shone, MD;  [facility];  Service: Urology    VA CYSTOURETHROSCOPY,FULGUR >5 CM LESN Bilateral 10/18/2021    Procedure: TRANSURETHRAL RESECTION OF BLADDER TUMOR (TURBT); Surgeon: Ramila Caro MD;  [facility];  Service: Urology    [facility] [place] 3RD+ ORD SLCTV ABDL PEL[facility] Left 2/8/2021    Procedure: LEG angiogram, CO2 w/limited contrast with balloon angioplasty postertior tibial artery;  Surgeon: Faiza Panchal MD;  [facility];  Service: Vascular    ROTATOR CUFF REPAIR      SMALL INTESTINE SURGERY      Surgery Shaji-en-Y    SPINAL FUSION      lumbar and cervical fusions    VAC DRESSING APPLICATION Right 6/78/7931    Procedure: APPLICATION VAC DRESSING;  Surgeon: Stewart Leavitt DPM;  [facility];  Service:     WOUND DEBRIDEMENT Left 2/16/2021    Procedure: FOOT DEBRIDE, [address] OUT w/graft application;  Surgeon: Stewart Leavitt DPM;  [facility];  Service: Podiatry       Meds/Allergies:    Prior to Admission medications    Medication Sig Start Date End Date Taking?  Authorizing Provider   aspirin (ECOTRIN LOW STRENGTH) 81 mg EC tablet Take 1 tablet (81 mg total) by mouth daily 11/1/21  Yes Angela Baker MD   Azelastine HCl 0 15 % SOLN Inhale 1 spray 2 (two) times a day 5/14/20  Yes Angela Baker MD   Bimatoprost (LUMIGAN OP) Apply 1 drop to eye daily at bedtime    Yes Historical Provider, MD   calcitriol (ROCALTROL) 0 25 mcg capsule Take 1 capsule (0 25 mcg total) by mouth daily 2/2/22  Yes Angela Baker MD   DULoxetine (CYMBALTA) 20 mg capsule Take 1 capsule (20 mg total) by mouth daily 2/2/22 Yes Aniket Miller MD   ezetimibe (ZETIA) 10 mg tablet Take 1 tablet (10 mg total) by mouth daily in the early morning 2/2/22  Yes Aniket Miller MD   Ferrous Sulfate Dried (Feosol) 200 (65 Fe) MG TABS Take 65 mg by mouth daily 2/2/22  Yes Aniket Miller MD   fluocinonide (LIDEX) 0 05 % cream Apply topically 2 (two) times a day  Patient taking differently: Apply topically as needed  5/18/21  Yes Aniket Miller MD   fluticasone [facility]) 50 mcg/act nasal spray 1 spray into each nostril daily  Patient taking differently: 1 spray into each nostril as needed  9/29/21  Yes Margarita Lucero MD   furosemide (LASIX) 20 mg tablet TAKE 1 TABLET BY MOUTH AS NEEDED FOR EDEMA 11/10/21  Yes Aniket Miller MD   gabapentin (Neurontin) 300 mg capsule Take 1 capsule (300 mg total) by mouth daily at bedtime 2/23/22 5/24/22 Yes Aniket Miller MD   isosorbide mononitrate (IMDUR) 30 mg 24 hr tablet Take 1 tablet (30 mg total) by mouth daily in the early morning 2/2/22  Yes Aniket Miller MD   Lantus SoloStar 100 units/mL injection pen 18 units qhs  Patient taking differently: Inject 18 Units under the skin daily at bedtime  6/24/21  Yes Katerin Gutierrez PA-C   lidocaine (XYLOCAINE) 2 % topical gel Apply topically as needed for mild pain 2/17/22  Yes Kalli Stein MD   lidocaine-prilocaine (EMLA) cream Apply topically as needed for mild pain 1/10/22  Yes Alinda Gaucher, MD   loperamide (IMODIUM) 2 mg capsule Take 2 mg by mouth 4 (four) times a day as needed for diarrhea   Yes Historical Provider, MD   metoprolol succinate (TOPROL-XL) 100 mg 24 hr tablet Take 1 tablet (100 mg total) by mouth daily for 7 days 12/13/21 4/6/22 Yes Aniket Miller MD   multivitamin [facility]) TABS Take 1 tablet by mouth daily     Yes Historical Provider, MD   NovoLOG FlexPen 100 units/mL injection pen 10 units with each meal   Patient taking differently: Inject 10 Units under the skin 3 (three) times a day with meals  2/5/21  Yes Jeanna Bernabe,    omeprazole (PriLOSEC) 20 mg delayed release capsule Take 20 mg by mouth daily in the early morning PRN    Yes Historical Provider, MD   oxybutynin (DITROPAN-XL) 10 MG 24 hr tablet Take 1 tablet (10 mg total) by mouth daily 3/3/22  Yes Reji Rivers DO   oxyCODONE (Roxicodone) 5 immediate release tablet Take 1-2 tablets (5-10 mg total) by mouth every 4 (four) hours as needed for moderate pain (5 mg moderate, 10 mg severe) Max Daily Amount: 60 mg 3/25/22  Yes April EVANGELINA Miller   senna (SENOKOT) 8 6 MG tablet Take 1 tablet (8 6 mg total) by mouth daily 3/25/22  Yes April EVANGELINA Miller   sertraline (ZOLOFT) 50 mg tablet Take 1 tablet (50 mg total) by mouth daily 2/2/22 4/6/22 Yes Italo Combs MD   [facility]) 0 4 mg Take 1 capsule (0 4 mg total) by mouth daily at bedtime 2/2/22 5/3/22 Yes Italo Combs MD   Accu-Chek Softclix Lancets lancets Test blood sugar 4 times daily 2/23/22   Italo Combs MD   acetaminophen (TYLENOL) 325 mg tablet Take 650 mg by mouth every 6 (six) hours as needed for mild pain    Historical Provider, MD   ALPRAZolam [facility]) 0 25 mg tablet At twice a day as needed for anxiety  Patient not taking: Reported on 3/25/2022  9/29/21   Italo Combs MD   glucose blood (Accu-Chek Martha Plus) test strip Test blood sugar 4 times daily 2/23/22   Italo Combs MD   Insulin Pen Needle 31G X 8 MM MISC Inject 3 times a day 5/8/19   Italo Combs MD   [facility]) 4 mg/0 1 mL nasal spray Administer 1 spray into a nostril  If no response after 2-3 minutes, give another dose in the other nostril using a new spray    Patient not taking: Reported on 3/25/2022  9/1/21   Italo Combs MD   sodium chloride, PF, 0 9 % 10 mL by Intracatheter route daily Intracatheter flushing daily 2/25/22 5/26/22  Analy Becker MD   sodium chloride, PF, 0 9 % 10 mL by Intracatheter route daily Intracatheter flushing daily 2/25/22 5/26/22  Analy Becker MD betamethasone valerate (VALISONE) 0 1 % cream Apply topically 2 (two) times a day 21  Trina Cruz MD     I have reviewed home medications using allscripts  Allergies: Allergies   Allergen Reactions    Atorvastatin Hives, Itching and Rash    Simvastatin Rash and Edema     Edema of lower legs    Statins Hives and Itching    Insulin Lispro Swelling and Edema     " Lower Legs"    Other Itching, Rash and Other (See Comments)     "EKG Patches"   "blue EKG patches"       Social History:     Marital Status: /Civil Union   Occupation: retired  Patient Pre-hospital Living Situation:  Resides with spouse and sister-in-law  Patient Pre-hospital Level of Mobility:  Cane  Patient Pre-hospital Diet Restrictions:   Substance Use History:   Social History     Substance and Sexual Activity   Alcohol Use Never    Comment: beer / liquor     Social History     Tobacco Use   Smoking Status Former Smoker    Packs/day: 3 00    Years: 27 00    Pack years: 81 00    Types: Cigarettes    Quit date: 5    Years since quittin 2   Smokeless Tobacco Never Used     Social History     Substance and Sexual Activity   Drug Use Not Currently    Types: Marijuana    Comment: quit 2019 had medical marijuana       Family History:    Family History   Problem Relation Age of Onset    Diabetes Mother     Heart disease Mother     Other Mother         High blood pressure    Heart disease Father     Diabetes Sister     Other Sister         High blood pressure    Kidney disease Sister     Heart disease Brother     Other Brother         High blood pressure       Physical Exam:     Vitals:   Blood Pressure: (!) 169/105 (22 0610)  Pulse: 74 (22 0610)  Temperature: 98 4 °F (36 9 °C) (22)  Respirations: 18 (22 0045)  Weight - Scale: 103 kg (226 lb 3 1 oz) (22)  SpO2: 99 % (22 0610)    Physical Exam  Constitutional:       General: He is not in acute distress  Appearance: Normal appearance  He is normal weight  He is not ill-appearing, toxic-appearing or diaphoretic  HENT:      Head: Normocephalic and atraumatic  Nose: No congestion or rhinorrhea  Mouth/Throat:      Mouth: Mucous membranes are moist    Eyes:      Extraocular Movements: Extraocular movements intact  Conjunctiva/sclera: Conjunctivae normal    Cardiovascular:      Rate and Rhythm: Normal rate and regular rhythm  Pulmonary:      Effort: Pulmonary effort is normal       Breath sounds: Normal breath sounds  Abdominal:      General: Bowel sounds are normal       Palpations: Abdomen is soft  Genitourinary:     Comments: Bilateral nephrostomy with minimal yellow urine output, Shirley catheter CBI with clear urine output  Musculoskeletal:      Right lower leg: No edema  Left lower leg: Edema present  Comments: 1+ LLE edema  Left Pinky toe amputation   Skin:     General: Skin is warm and dry  Neurological:      Mental Status: He is alert and oriented to person, place, and time  Psychiatric:         Mood and Affect: Mood normal          Behavior: Behavior normal          Thought Content: Thought content normal          Judgment: Judgment normal       Comments: Pleasant       Additional Data:     Lab Results: I have personally reviewed pertinent reports        Results from last 7 days   Lab Units 04/06/22  2336   WBC Thousand/uL 5 61   HEMOGLOBIN g/dL 9 2*   HEMATOCRIT % 27 1*   PLATELETS Thousands/uL 152   NEUTROS PCT % 64   LYMPHS PCT % 25   MONOS PCT % 9   EOS PCT % 2     Results from last 7 days   Lab Units 04/06/22  2336   SODIUM mmol/L 136   POTASSIUM mmol/L 4 4   CHLORIDE mmol/L 104   CO2 mmol/L 24   BUN mg/dL 47*   CREATININE mg/dL 3 28*   ANION GAP mmol/L 8   CALCIUM mg/dL 8 7   ALBUMIN g/dL 3 0*   TOTAL BILIRUBIN mg/dL 0 29   ALK PHOS U/L 138*   ALT U/L 62   AST U/L 40   GLUCOSE RANDOM mg/dL 134     Results from last 7 days   Lab Units 04/06/22  2336   INR  1 07     Results from last 7 days   Lab Units 04/07/22  0552   POC GLUCOSE mg/dl 74               Imaging: I have personally reviewed pertinent reports  CT abdomen pelvis wo contrast   Final Result by Divya Limon MD (04/07 0326)      1  Interval placement of bilateral percutaneous nephrostomy tubes in place  No hydronephrosis  2   Persistent diffuse wall thickening of the urinary bladder with surrounding fat stranding likely representing known malignancy with cystitis  Workstation performed: [id number]             EKG, Pathology, and Other Studies Reviewed on Admission:   [facility] Yes     ** Please Note: This note has been constructed using a voice recognition system   **

## 2022-04-07 NOTE — QUICK NOTE
Afternoon re-evaluation:     Patient requiring manual irrigation by nursing staff this afternoon for clot obstruction  Urine is currently clear light pink on CBI this afternoon following manual irrigation  Bilateral PCNs remain patent with clear yellow drainage  Discussed with Dr Ally Tee who recommends CBI with manual irrigations overnight  NPO at midnight as precaution for tomorrow      Marco A Miller PA-C

## 2022-04-07 NOTE — ASSESSMENT & PLAN NOTE
· Gross hematuria in the setting of known bladder malignancy and acute on chronic cystitis  · Hgb stable 9 0-- 9 0--8 6  · Urine culture 4/5 with Serratia marcescens, susceptible to IV Rocephin- near completion  · Now POD#1 diagnostic cystoscopy several bladder biopsies, prostatic urethral biopsy taken  Generalized mucosal edema/irritation and necrosis related to underlying malignancy and chemoradiation treatments  No active mucosal bleeding  It is possible/likely he may continue with chronic intermittent hematuria in the small amount of urine that he does pass per urethra  · No change in trajectory of treatment/management of his recurrent and refractory cancer  · Appreciate palliative care input on pain regimen   B&O suppositories can also be used if able to fill rx at pharmacy; has been helpful for managing his chronic cancer related pain and spasm

## 2022-04-07 NOTE — ASSESSMENT & PLAN NOTE
· Hx of PAD with nonhealing left foot amputation s/p left lower extremity angiogram x 2 with successful posterior tibial atherectomy and angioplasty and subsequent revision of left foot amputation site  Followed outpatient by vascular surgery   Maintained on ASA and zetia

## 2022-04-07 NOTE — ASSESSMENT & PLAN NOTE
Lab Results   Component Value Date    HGBA1C 7 7 (H) 09/03/2021     · Maintained outpatient on NovoLog and Lantus, decrease while inpatient  Add sliding scale insulin  · ADA diet    No results for input(s): POCGLU in the last 72 hours      Blood Sugar Average: Last 72 hrs:

## 2022-04-07 NOTE — ASSESSMENT & PLAN NOTE
· Presents with hematuria, hx bladder cancer and gross hematuria s/p TURBT, has hematuria since his procedure  · CT A/P: Interval placement of bilateral percutaneous nephrostomy tubes in place  No hydronephrosis  Persistent diffuse wall thickening of the urinary bladder with surrounding fat stranding likely representing known malignancy with cystitis  · UA red and cloudy, large leuks and nitrites    · ED discussed with urology recommended 3 way Shirley catheter and CBI  · Nephrostomy tubes present, per Urology flush nephrostomy tubes 10ml 2 to 3 times daily  · For complicated UTI will treat with IV Levaquin; renally dosed Levofloxacin 750mg x1, followed by levofloxacin 500mg q48h  Previous urine cultures grew E faecalis susceptible to levofloxacin  · Monitor intake and output  · Manual irrigation p r n    · Follow-up urine culture  · Urology consult

## 2022-04-07 NOTE — ASSESSMENT & PLAN NOTE
· CAD s/p stenting, maintained on ASA, Imdur, Toprol and zetia    Hold aspirin in setting of hematuria  · Followed outpatient by cardiology at [facility]

## 2022-04-07 NOTE — ASSESSMENT & PLAN NOTE
· Hydromorphone 2 mg verified on PDMP  · Seen by palliative on March 25th and ordered oxycodone 5mg for mod pain, oxycodone 10 mg for severe pain q 4h p r n

## 2022-04-07 NOTE — PROGRESS NOTES
Consult - Urology   Rudyie Finder 1945, 76 y o  male MRN: [id number]    Unit/Bed#: ED 19 Encounter: [id number]    * Acute cystitis with hematuria  Assessment & Plan  · Gross hematuria in the setting of known bladder malignancy and acute cystitis  · Urine culture 4/5 with Serratia marcescens, susceptible to IV Rocephin  · UA in ED appears infected  · Repeat culture sent  Continue IV Rocephin  · Shirley catheter patent with clear light pink urine on CBI  Manual irrigation preformed by nursing staff this morning with significant clot return  Manually irrigated myself at the bedside later this morning, no clots returned  Suspect old clot burden, improving with CBI  · B&O suppositories added to his bladder spasm regimen  · Discussed with Dr Isabelle Almonte, will continue with CBI and manual irrigations today  No plans for OR today  · Will make NPO at midnight for re-evaluation tomorrow  Subjective:   Patient is a 76year old male well known to our service recurrent, BCG refractory, Stage II, [id number], 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  Also with history of CAD s/p 7 cardiac stents, CKD stage 4, DM, HTN,a nd HLD  He is s/p TURBT on 10/18/2021  Patient developed gross hematuria following his procedure and was taken back to the OR on 10/24/2021 for cystoscopy with clot evacuation fulguration  Right ureteral stent was placed at that time for decompression of right kidney and was left in place in anticipation of future procedures  He had bilateral nephrostomy tubes placed on 2/25/22  Right ureteral stent was subsequently removed in the OR by Dr Juju Rosenbaum on 3/1/22  Patient previously evaluated by [facility] on 02/18/2022 who did not recommend any surgical intervention at this time given his extensive comorbidities  He recently contacted our office on 4/4 with reports of gross hematuria with dysuria and bladder spasms  He presented to the ED today with hematuria   A 3 way wagner catheter was placed and CBI initiated  He had extensive clots irrigated this morning with nursing staff, currently improving  CT reveals bilateral PCNs in appropriate position without hydronephrosis  Persistent diffuse wall thickening of the bladder with surrounding fat stranding likely representing known malignancy with cystitis  Outpatient urine culture from 4/5 positive for Serratia marcescens  He received IV Rocephin the ED  Orders placed for IV Levaquin but transitioned back to IV Rocephin based off previous sensitivities  Patient denies any abdominal or flank pain  He continues to have spastic urethral and bladder pain, especially exacerbated by manual irrigation  He denies any fevers or chills at home  Review of Systems   Constitutional: Negative for chills and fever  HENT: Negative  Respiratory: Negative for cough and shortness of breath  Cardiovascular: Negative for chest pain and palpitations  Gastrointestinal: Negative for abdominal pain, nausea and vomiting  Genitourinary: Positive for difficulty urinating, hematuria and penile pain  Negative for flank pain, scrotal swelling and testicular pain  Spastic bladder spasms   Musculoskeletal: Negative  Skin: Negative  Neurological: Negative for dizziness and light-headedness  Objective:  Communications: Plan discussed with Dr Tyrel Rod from [facility]  Vitals: Blood pressure 157/87, pulse 62, temperature 98 4 °F (36 9 °C), resp  rate 18, weight 103 kg (226 lb 3 1 oz), SpO2 100 %  ,Body mass index is 30 64 kg/m²  Physical Exam  Vitals reviewed  Constitutional:       General: He is not in acute distress  Appearance: Normal appearance  He is obese  He is not ill-appearing, toxic-appearing or diaphoretic  HENT:      Head: Normocephalic and atraumatic  Eyes:      Conjunctiva/sclera: Conjunctivae normal    Cardiovascular:      Rate and Rhythm: Normal rate and regular rhythm  Heart sounds: Normal heart sounds  Pulmonary:      Effort: Pulmonary effort is normal  No respiratory distress  Breath sounds: Normal breath sounds  Abdominal:      General: Bowel sounds are normal  There is no distension  Palpations: Abdomen is soft  Tenderness: There is no abdominal tenderness  There is no right CVA tenderness, left CVA tenderness, guarding or rebound  Comments: Bilateral PCNs patent draining clear yellow urine  Genitourinary:     Comments: Shirley catheter patent draining clear light pink urine on CBI  Manual irrigation this morning with Nursing staff returning large clots  Manual irrigation by me at bedside later this morning with pale blush pink return, no clots  Significant bladder spasms with irrigation  Musculoskeletal:         General: Normal range of motion  Cervical back: Normal range of motion  Skin:     General: Skin is warm and dry  Neurological:      General: No focal deficit present  Mental Status: He is alert and oriented to person, place, and time  Psychiatric:         Mood and Affect: Mood normal          Behavior: Behavior normal          Thought Content: Thought content normal          Judgment: Judgment normal        Imagin22: CT ABDOMEN AND PELVIS WITHOUT IV CONTRAST     INDICATION:   Abdominal pain, acute, nonlocalized  Hematuria, lower abdominal pain      COMPARISON:  CT abdomen and pelvis on 2022      TECHNIQUE:  CT examination of the abdomen and pelvis was performed without intravenous contrast   Axial, sagittal, and coronal 2D reformatted images were created from the source data and submitted for interpretation       Radiation dose length product (DLP) for this visit:  571 mGy-cm     This examination, like all CT scans performed in the [facility], was performed utilizing techniques to minimize radiation dose exposure, including the use of iterative   reconstruction and automated exposure control       Enteric contrast was not administered       FINDINGS:     ABDOMEN     LOWER CHEST:  No clinically significant abnormality identified in the visualized lower chest      LIVER/BILIARY TREE:  Unremarkable      GALLBLADDER:  Gallbladder is surgically absent      SPLEEN:  Unremarkable      PANCREAS:  Unremarkable      ADRENAL GLANDS:  Unremarkable      KIDNEYS/URETERS:  Previously noted right-sided ureteral stent has been removed  Interval placement of bilateral percutaneous nephrostomy tubes in place  Tiny locules of air in the right upper renal pole and to lesser extent left upper renal pole   presumably from recent instrumentation  Mild fullness of the left renal collecting system  No hydronephrosis  Bilateral renal cysts      STOMACH AND BOWEL:  Status post Shaji-en-Y gastric bypass  Colonic diverticulosis without evidence of acute diverticulitis      APPENDIX:  A normal appendix was visualized      ABDOMINOPELVIC CAVITY:  Mild presacral edema  No pneumoperitoneum  No lymphadenopathy      VESSELS:  Atherosclerotic changes are present  No evidence of aneurysm      PELVIS     REPRODUCTIVE ORGANS:  The prostate is enlarged      URINARY BLADDER:  Persistent diffuse wall thickening with surrounding fat stranding, not significantly changed      ABDOMINAL WALL/INGUINAL REGIONS:  Foci of stranding in the anterior abdominal wall likely secondary to subcutaneous injections      OSSEOUS STRUCTURES:  No acute fracture or destructive osseous lesion  Spinal degenerative changes are noted      IMPRESSION:     1  Interval placement of bilateral percutaneous nephrostomy tubes in place    No hydronephrosis      2   Persistent diffuse wall thickening of the urinary bladder with surrounding fat stranding likely representing known malignancy with cystitis        Labs:  Recent Labs     04/06/22  2336   WBC 5 61     Recent Labs     04/06/22  2336   HGB 9 2*       Recent Labs     04/06/22  2336 04/07/22  0930   CREATININE 3 28* 3 12* Microbiology:  Urine Culture 60,000-69,000 cfu/ml Serratia marcescens Abnormal        <10,000 cfu/ml     Mixed Contaminants X2          Susceptibility     Serratia marcescens     ERIC     Amoxicillin + Clavulanate >16/8 ug/ml Resistant     Ampicillin ($$) 16 00 ug/ml Resistant     Ampicillin + Sulbactam ($) 16/8 ug/ml Resistant     Aztreonam ($$$)  <=4 ug/ml Susceptible     Cefazolin ($) >16 00 ug/ml Resistant     Ceftazidime ($$) <=1 ug/ml Susceptible     Ceftriaxone ($$) <=1 00 ug/ml Susceptible     Cefuroxime ($$) >16 ug/ml Resistant     Ciprofloxacin ($)  <=0 25 ug/ml Susceptible     Ertapenem ($$$) <=0 5 ug/ml Susceptible     Gentamicin ($$) <=2 ug/ml Susceptible     Levofloxacin ($) <=0 50 ug/ml Susceptible     Minocycline <=4 ug/ml Susceptible     Nitrofurantoin >64 ug/ml Resistant     Piperacillin + Tazobactam ($$$) <=8 ug/ml Susceptible     Tetracycline >8 ug/ml Resistant     Tobramycin ($) <=2 ug/ml Susceptible     Trimethoprim + Sulfamethoxazole ($$$) <=0 5/9 5 u   Susceptible     ZID Performed Yes                    Specimen Collected: 04/05/22 12:17 Last Resulted: 04/07/22 09:21           Component      Latest Ref Rng & Units 4/7/2022             Clarity, UA       Cloudy   Color, UA       Red   SL AMB SPECIFIC GRAVITY-URINE      1 003 - 1 030 1 020   pH, UA      4 5, 5 0, 5 5, 6 0, 6 5, 7 0, 7 5, 8 0 6 5   Glucose, UA      Negative mg/dl Negative   Ketones, UA      Negative mg/dl Trace (A)   Blood, UA      Negative Large (A)   POCT URINE PROTEIN      Negative mg/dl >=300 (A)   Nitrite, UA      Negative Positive (A)   Bilirubin, UA      Negative Interference- unable to analyze (A)   SL AMB POCT UROBILINOGEN      0 2, 1 0 E U /dl E U /dl 1 0   Leukocytes, UA      Negative Large (A)     Component      Latest Ref Rng & Units 4/7/2022   WBC, UA      None Seen, 2-4, 5-60 /hpf Innumerable (A)   RBC, UA      None Seen, 2-4 /hpf Innumerable (A)   Bacteria, UA      None Seen, Occasional /hpf Occasional Epithelial Cells      None Seen, Occasional /hpf Occasional     Repeat culture pending       History:  Social History     Socioeconomic History    Marital status: /Civil Union     Spouse name: None    Number of children: None    Years of education: None    Highest education level: None   Occupational History    None   Tobacco Use    Smoking status: Former Smoker     Packs/day: 3 00     Years: 27 00     Pack years: 81 00     Types: Cigarettes     Quit date: 1970     Years since quittin 2    Smokeless tobacco: Never Used   Vaping Use    Vaping Use: Never used   Substance and Sexual Activity    Alcohol use: Never     Comment: beer / liquor    Drug use: Not Currently     Types: Marijuana     Comment: quit 2019 had medical marijuana    Sexual activity: Not Currently   Other Topics Concern    None   Social History Narrative    Consumes 1 cup of coffee and 1 soda per day     Social Determinants of Health     Financial Resource Strain: Not on file   Food Insecurity: Not on file   Transportation Needs: Not on file   Physical Activity: Not on file   Stress: Not on file   Social Connections: Not on file   Intimate Partner Violence: Not on file   Housing Stability: Not on file       Past Medical History:   Diagnosis Date    Anemia     Last assessed: 17    Anxiety     Arteriosclerotic cardiovascular disease     Last assessed: 17    Arthritis     Bladder cancer ([facility] )     bladder- had BCG treatments    Chronic kidney disease     Stage IV    CKD (chronic kidney disease) stage 4, GFR 15-29 ml/min ([facility])     Colon polyp     Coronary artery disease     7 stents    Depression     Diabetes mellitus ([facility] )     IDDM    GERD (gastroesophageal reflux disease)     Glaucoma     Hematuria     History of fusion of cervical spine     Hyperlipidemia     Hypertension     Insomnia     Last assessed: 12    Loss of hearing     has hearing aids but usually does not wear them    Other seasonal allergic rhinitis     Last assessed: 2/10/16    PAD (peripheral artery disease) (HCC)     Shortness of breath     on exertion    Spinal stenosis of lumbar region     Transient cerebral ischemia     No Residual    Uses walker     w/c for longer distances     Past Surgical History:   Procedure Laterality Date    CARDIAC SURGERY      Cath stent placement  Last assessed: 3/9/17  Interventional Catheterization    CHOLECYSTECTOMY      COLONOSCOPY      CYSTOSCOPY      Diagnostic w/biopsy  Amanda Hdz  Last assessed: 12/1/14    CYSTOSCOPY W/ RETROGRADES Right 3/1/2022    Procedure: CYSTO; stent removal retrograde;  Surgeon: Alivia Garcia MD;  [facility];  Service: Urology    CYSTOSCOPY W/ URETERAL STENT PLACEMENT Bilateral 10/18/2021    Procedure: bilateral retrogrades, cytology collection;  Surgeon: Alivia Garcia MD;  [facility];  Service: Urology    CYSTOURETHROSCOPY      w/cautery  Amanda Hdz    FL RETROGRADE PYELOGRAM  10/18/2021    FL RETROGRADE PYELOGRAM  10/24/2021    GASTRIC BYPASS      For morbid obesity w/Shaji-en-Y   Resolved: 11/17/09    INCISION AND DRAINAGE OF WOUND Right 2/26/2017    Procedure: INCISION AND DRAINAGE (I&D) EXTREMITY WITH APPLICATION OF GRAFT JACKET;  Surgeon: Ti Corrales DPM;  [facility];  Service:     INCISION AND DRAINAGE OF WOUND Right 4/25/2017    Procedure: INCISION AND DRAINAGE (I&D) EXTREMITY, APPLICATION OF GRAFT;  Surgeon: Ti Corrales DPM;  [facility];  Service:     IR BIOPSY OTHER  7/2/2020    IR LOWER EXTREMITY ANGIOGRAM  2/8/2021    IR LOWER EXTREMITY ANGIOGRAM  2/11/2021    IR NEPHROSTOMY TUBE PLACEMENT  2/25/2022    IR PORT PLACEMENT  1/17/2022    IR TUNNELED CENTRAL LINE PLACEMENT  12/24/2020    JOINT REPLACEMENT      christofer knees replaced    VT AMPUTATION METATARSAL+TOE,SINGLE Left 12/21/2020    Procedure: RAY RESECTION FOOT;  Surgeon: Yair Mcclelland DPM;  [facility];  Service: Podiatry    CT AMPUTATION METATARSAL+TOE,SINGLE Left 12/31/2020    Procedure: 5TH MET RESECTION;  Surgeon: Jason Pretty DPM;  [facility];  Service: Podiatry    CT CYSTOURETHROSCOPY W/IRRIG & EVAC CLOTS N/A 2/10/2021    Procedure: CYSTOSCOPY EVACUATION OF CLOTS, fulguration;  Surgeon: Reece Holman MD;  [facility];  Service: Urology    CT CYSTOURETHROSCOPY W/IRRIG & EVAC CLOTS N/A 10/24/2021    Procedure: CYSTOSCOPY EVACUATION OF CLOT, fulguration of bleeding vessels, right ureter stent placement, retrograde pyelogram;  Surgeon: Pavan Ibrahim MD;  [facility];  Service: Urology    CT CYSTOURETHROSCOPY,BIOPSY N/A 8/16/2016    Procedure: CYSTOSCOPY WITH BIOPSIES;  Surgeon: Leonila Valencia MD;  [facility];  Service: Urology    CT CYSTOURETHROSCOPY,FULGUR <0 5 CM LESN N/A 11/19/2020    Procedure: CYSTO W/BIOPSIES, transurethral prostate bx;  Surgeon: Ivory Patel MD;  [facility];  Service: Urology    CT CYSTOURETHROSCOPY,FULGUR >5 CM LESN Bilateral 10/18/2021    Procedure: TRANSURETHRAL RESECTION OF BLADDER TUMOR (TURBT);   Surgeon: Kristofer Garcia MD;  [facility];  Service: Urology    CT Alonso Else 3RD+ ORD SLCTV ABDL PEL/[facility] Left 2/8/2021    Procedure: LEG angiogram, CO2 w/limited contrast with balloon angioplasty postertior tibial artery;  Surgeon: Pool Novak MD;  [facility];  Service: Vascular    ROTATOR CUFF REPAIR      SMALL INTESTINE SURGERY      Surgery Shaji-en-Y    SPINAL FUSION      lumbar and cervical fusions    VAC DRESSING APPLICATION Right 6/23/0298    Procedure: APPLICATION VAC DRESSING;  Surgeon: Khloe Jameson DPM;  [facility];  Service:     WOUND DEBRIDEMENT Left 2/16/2021    Procedure: FOOT DEBRIDE, [address] OUT w/graft application;  Surgeon: Khloe Jameson DPM;  [facility];  Service: Podiatry     Family History   Problem Relation Age of Onset    Diabetes Mother     Heart disease Mother     Other Mother         High blood pressure    Heart disease Father     Diabetes Sister     Other Sister         High blood pressure    Kidney disease Sister     Heart disease Brother     Other Brother         High blood pressure       Surendra Ames [place]  Date: 4/7/2022 Time: 11:37 AM

## 2022-04-08 LAB
ANION GAP SERPL CALCULATED.3IONS-SCNC: 9 MMOL/L (ref 4–13)
BASOPHILS # BLD AUTO: 0.02 THOUSANDS/ΜL (ref 0–0.1)
BASOPHILS NFR BLD AUTO: 0 % (ref 0–1)
BUN SERPL-MCNC: 39 MG/DL (ref 5–25)
CALCIUM SERPL-MCNC: 8.7 MG/DL (ref 8.3–10.1)
CHLORIDE SERPL-SCNC: 104 MMOL/L (ref 100–108)
CO2 SERPL-SCNC: 24 MMOL/L (ref 21–32)
CREAT SERPL-MCNC: 2.82 MG/DL (ref 0.6–1.3)
EOSINOPHIL # BLD AUTO: 0.11 THOUSAND/ΜL (ref 0–0.61)
EOSINOPHIL NFR BLD AUTO: 1 % (ref 0–6)
ERYTHROCYTE [DISTWIDTH] IN BLOOD BY AUTOMATED COUNT: 14.6 % (ref 11.6–15.1)
GFR SERPL CREATININE-BSD FRML MDRD: 20 ML/MIN/1.73SQ M
GLUCOSE SERPL-MCNC: 107 MG/DL (ref 65–140)
GLUCOSE SERPL-MCNC: 115 MG/DL (ref 65–140)
GLUCOSE SERPL-MCNC: 129 MG/DL (ref 65–140)
GLUCOSE SERPL-MCNC: 132 MG/DL (ref 65–140)
GLUCOSE SERPL-MCNC: 172 MG/DL (ref 65–140)
GLUCOSE SERPL-MCNC: 178 MG/DL (ref 65–140)
GLUCOSE SERPL-MCNC: 250 MG/DL (ref 65–140)
HCT VFR BLD AUTO: 26.3 % (ref 36.5–49.3)
HGB BLD-MCNC: 9 G/DL (ref 12–17)
IMM GRANULOCYTES # BLD AUTO: 0.02 THOUSAND/UL (ref 0–0.2)
IMM GRANULOCYTES NFR BLD AUTO: 0 % (ref 0–2)
LYMPHOCYTES # BLD AUTO: 1.13 THOUSANDS/ΜL (ref 0.6–4.47)
LYMPHOCYTES NFR BLD AUTO: 14 % (ref 14–44)
MCH RBC QN AUTO: 32.4 PG (ref 26.8–34.3)
MCHC RBC AUTO-ENTMCNC: 34.2 G/DL (ref 31.4–37.4)
MCV RBC AUTO: 95 FL (ref 82–98)
MONOCYTES # BLD AUTO: 0.72 THOUSAND/ΜL (ref 0.17–1.22)
MONOCYTES NFR BLD AUTO: 9 % (ref 4–12)
NEUTROPHILS # BLD AUTO: 5.85 THOUSANDS/ΜL (ref 1.85–7.62)
NEUTS SEG NFR BLD AUTO: 76 % (ref 43–75)
NRBC BLD AUTO-RTO: 0 /100 WBCS
PLATELET # BLD AUTO: 150 THOUSANDS/UL (ref 149–390)
PMV BLD AUTO: 9.8 FL (ref 8.9–12.7)
POTASSIUM SERPL-SCNC: 4.6 MMOL/L (ref 3.5–5.3)
RBC # BLD AUTO: 2.78 MILLION/UL (ref 3.88–5.62)
SODIUM SERPL-SCNC: 137 MMOL/L (ref 136–145)
WBC # BLD AUTO: 7.85 THOUSAND/UL (ref 4.31–10.16)

## 2022-04-08 PROCEDURE — 82948 REAGENT STRIP/BLOOD GLUCOSE: CPT

## 2022-04-08 PROCEDURE — 99232 SBSQ HOSP IP/OBS MODERATE 35: CPT | Performed by: INTERNAL MEDICINE

## 2022-04-08 PROCEDURE — 85025 COMPLETE CBC W/AUTO DIFF WBC: CPT | Performed by: INTERNAL MEDICINE

## 2022-04-08 PROCEDURE — 80048 BASIC METABOLIC PNL TOTAL CA: CPT | Performed by: INTERNAL MEDICINE

## 2022-04-08 PROCEDURE — 99232 SBSQ HOSP IP/OBS MODERATE 35: CPT | Performed by: NURSE PRACTITIONER

## 2022-04-08 RX ORDER — GABAPENTIN 300 MG/1
300 CAPSULE ORAL ONCE
Status: COMPLETED | OUTPATIENT
Start: 2022-04-08 | End: 2022-04-08

## 2022-04-08 RX ORDER — GABAPENTIN 300 MG/1
300 CAPSULE ORAL
Status: DISCONTINUED | OUTPATIENT
Start: 2022-04-08 | End: 2022-04-14 | Stop reason: HOSPADM

## 2022-04-08 RX ADMIN — EZETIMIBE 10 MG: 10 TABLET ORAL at 06:03

## 2022-04-08 RX ADMIN — INSULIN LISPRO 5 UNITS: 100 INJECTION, SOLUTION INTRAVENOUS; SUBCUTANEOUS at 12:12

## 2022-04-08 RX ADMIN — HYDROMORPHONE HYDROCHLORIDE 0.5 MG: 1 INJECTION, SOLUTION INTRAMUSCULAR; INTRAVENOUS; SUBCUTANEOUS at 14:12

## 2022-04-08 RX ADMIN — INSULIN LISPRO 1 UNITS: 100 INJECTION, SOLUTION INTRAVENOUS; SUBCUTANEOUS at 06:03

## 2022-04-08 RX ADMIN — BIMATOPROST 1 DROP: 0.1 SOLUTION/ DROPS OPHTHALMIC at 22:29

## 2022-04-08 RX ADMIN — Medication 325 MG: at 08:29

## 2022-04-08 RX ADMIN — INSULIN LISPRO 1 UNITS: 100 INJECTION, SOLUTION INTRAVENOUS; SUBCUTANEOUS at 17:39

## 2022-04-08 RX ADMIN — PANTOPRAZOLE SODIUM 20 MG: 20 TABLET, DELAYED RELEASE ORAL at 06:03

## 2022-04-08 RX ADMIN — HYDROMORPHONE HYDROCHLORIDE 0.5 MG: 1 INJECTION, SOLUTION INTRAMUSCULAR; INTRAVENOUS; SUBCUTANEOUS at 18:25

## 2022-04-08 RX ADMIN — OXYBUTYNIN 10 MG: 10 TABLET, FILM COATED, EXTENDED RELEASE ORAL at 08:12

## 2022-04-08 RX ADMIN — SERTRALINE HYDROCHLORIDE 50 MG: 50 TABLET ORAL at 08:12

## 2022-04-08 RX ADMIN — METOPROLOL SUCCINATE 100 MG: 100 TABLET, EXTENDED RELEASE ORAL at 08:12

## 2022-04-08 RX ADMIN — ISOSORBIDE MONONITRATE 30 MG: 30 TABLET, EXTENDED RELEASE ORAL at 06:02

## 2022-04-08 RX ADMIN — CALCITRIOL CAPSULES 0.25 MCG 0.25 MCG: 0.25 CAPSULE ORAL at 08:12

## 2022-04-08 RX ADMIN — OXYCODONE HYDROCHLORIDE 5 MG: 5 TABLET ORAL at 08:12

## 2022-04-08 RX ADMIN — TAMSULOSIN HYDROCHLORIDE 0.4 MG: 0.4 CAPSULE ORAL at 22:28

## 2022-04-08 RX ADMIN — GABAPENTIN 300 MG: 300 CAPSULE ORAL at 09:19

## 2022-04-08 RX ADMIN — ATROPA BELLADONNA AND OPIUM 1 SUPPOSITORY: 16.2; 3 SUPPOSITORY RECTAL at 10:26

## 2022-04-08 RX ADMIN — Medication 1000 MG: at 09:33

## 2022-04-08 RX ADMIN — INSULIN GLARGINE 10 UNITS: 100 INJECTION, SOLUTION SUBCUTANEOUS at 22:27

## 2022-04-08 RX ADMIN — INSULIN LISPRO 3 UNITS: 100 INJECTION, SOLUTION INTRAVENOUS; SUBCUTANEOUS at 01:04

## 2022-04-08 RX ADMIN — ALTEPLASE 2 MG: 2.2 INJECTION, POWDER, LYOPHILIZED, FOR SOLUTION INTRAVENOUS at 08:13

## 2022-04-08 RX ADMIN — DULOXETINE HYDROCHLORIDE 20 MG: 20 CAPSULE, DELAYED RELEASE PELLETS ORAL at 08:15

## 2022-04-08 RX ADMIN — GABAPENTIN 300 MG: 300 CAPSULE ORAL at 22:28

## 2022-04-08 RX ADMIN — B-COMPLEX W/ C & FOLIC ACID TAB 1 TABLET: TAB at 09:19

## 2022-04-08 RX ADMIN — INSULIN LISPRO 5 UNITS: 100 INJECTION, SOLUTION INTRAVENOUS; SUBCUTANEOUS at 16:39

## 2022-04-08 NOTE — PROGRESS NOTES
Progress Note - Urology  Julito Garzon 1945, 76 y o  male MRN: [id number]    Unit/Bed#: E2 -01 Encounter: [id number]    * Acute cystitis with hematuria  Assessment & Plan  · Gross hematuria in the setting of known bladder malignancy and acute cystitis  · Hgb stable 9 7 today   · Urine culture 4/5 with Serratia marcescens, susceptible to IV Rocephin  · UA in ED appears infected  Culture pending   · Continue IV Rocephin  · Shirley catheter patent with clear light pink urine on slow CBI  Will clamp  Void trial once cleared   · B&O suppositories added to his bladder spasm regimen  · Will continue to follow           Subjective:   HPI: Patient doing well this morning  Denies any issues over night, catheter patent draining well       Objective:  Nursing Rounds: RN at bedside   Vitals: Blood pressure 145/74, pulse 61, temperature 98 1 °F (36 7 °C), temperature source Tympanic, resp  rate 20, weight 103 kg (226 lb 3 1 oz), SpO2 98 %  ,Body mass index is 30 64 kg/m²  Physical Exam  Vitals reviewed  Constitutional:       Appearance: Normal appearance  He is obese  He is not ill-appearing  HENT:      Head: Normocephalic and atraumatic  Cardiovascular:      Rate and Rhythm: Normal rate and regular rhythm  Heart sounds: Normal heart sounds  Pulmonary:      Effort: Pulmonary effort is normal  No respiratory distress  Breath sounds: Normal breath sounds  Abdominal:      General: Bowel sounds are normal  There is no distension  Palpations: Abdomen is soft  Tenderness: There is no abdominal tenderness  There is no right CVA tenderness, left CVA tenderness, guarding or rebound  Comments: Bilateral PCNs patent draining clear yellow urine  Genitourinary:     Comments: Shirley catheter patent draining clear light pink urine onslow  CBI  Will clamp CBI   Musculoskeletal:         General: Normal range of motion  Cervical back: Normal range of motion     Skin:     General: Skin is warm and dry  Neurological:      General: No focal deficit present  Mental Status: He is alert and oriented to person, place, and time  Psychiatric:         Mood and Affect: Mood normal          Behavior: Behavior normal          Thought Content: Thought content normal          Judgment: Judgment normal          Imaging:  CT abd pelvis      1  Interval placement of bilateral percutaneous nephrostomy tubes in place  No hydronephrosis      2   Persistent diffuse wall thickening of the urinary bladder with surrounding fat stranding likely representing known malignancy with cystitis  Imaging reviewed - both report and images personally reviewed       Labs:  Recent Labs     04/06/22  2336   WBC 5 61       Recent Labs     04/06/22  2336 04/07/22  1615   HGB 9 2* 9 7*     Recent Labs     04/06/22  2336 04/07/22  1615   HCT 27 1* 28 8*     Recent Labs     04/06/22  2336 04/07/22  0930   CREATININE 3 28* 3 12*       Urine Culture: Pending    History:    Past Medical History:   Diagnosis Date    Anemia     Last assessed: 9/28/17    Anxiety     Arteriosclerotic cardiovascular disease     Last assessed: 9/28/17    Arthritis     Bladder cancer ([facility] [address] )     bladder- had BCG treatments    Chronic kidney disease     Stage IV    CKD (chronic kidney disease) stage 4, GFR 15-29 ml/min ([facility])     Colon polyp     Coronary artery disease     7 stents    Depression     Diabetes mellitus ([facility])     IDDM    GERD (gastroesophageal reflux disease)     Glaucoma     Hematuria     History of fusion of cervical spine     Hyperlipidemia     Hypertension     Insomnia     Last assessed: 11/14/12    Loss of hearing     has hearing aids but usually does not wear them    Other seasonal allergic rhinitis     Last assessed: 2/10/16    PAD (peripheral artery disease) ([facility])     Shortness of breath     on exertion    Spinal stenosis of lumbar region     Transient cerebral ischemia     No Residual    Uses walker     w/c for longer distances     Social History     Socioeconomic History    Marital status: /Civil Union     Spouse name: None    Number of children: None    Years of education: None    Highest education level: None   Occupational History    None   Tobacco Use    Smoking status: Former Smoker     Packs/day: 3 00     Years: 27 00     Pack years: 81 00     Types: Cigarettes     Quit date: 1970     Years since quittin 3    Smokeless tobacco: Never Used   Vaping Use    Vaping Use: Never used   Substance and Sexual Activity    Alcohol use: Never     Comment: beer / liquor    Drug use: Not Currently     Types: Marijuana     Comment: quit 2019 had medical marijuana    Sexual activity: Not Currently   Other Topics Concern    None   Social History Narrative    Consumes 1 cup of coffee and 1 soda per day     Social Determinants of Health     Financial Resource Strain: Not on file   Food Insecurity: Not on file   Transportation Needs: Not on file   Physical Activity: Not on file   Stress: Not on file   Social Connections: Not on file   Intimate Partner Violence: Not on file   Housing Stability: Not on file     Past Surgical History:   Procedure Laterality Date    CARDIAC SURGERY      Cath stent placement  Last assessed: 3/9/17  Interventional Catheterization    CHOLECYSTECTOMY      COLONOSCOPY      CYSTOSCOPY      Diagnostic w/biopsy  Clement Mecosta  Last assessed: 14    CYSTOSCOPY W/ RETROGRADES Right 3/1/2022    Procedure: CYSTO; stent removal retrograde;  Surgeon: Fercho Hills MD;  [facility];  Service: Urology    CYSTOSCOPY W/ URETERAL STENT PLACEMENT Bilateral 10/18/2021    Procedure: bilateral retrogrades, cytology collection;  Surgeon: Fercho Hills MD;  [facility];  Service: Urology    CYSTOURETHROSCOPY      w/cautery   Urbano Villagran    FL RETROGRADE PYELOGRAM  10/18/2021    FL RETROGRADE PYELOGRAM  10/24/2021    GASTRIC BYPASS      For morbid obesity w/Shaji-en-Y   Resolved: 11/17/09    INCISION AND DRAINAGE OF WOUND Right 2/26/2017    Procedure: INCISION AND DRAINAGE (I&D) EXTREMITY WITH APPLICATION OF GRAFT JACKET;  Surgeon: Natasha Garcia DPM;  [facility];  Service:     INCISION AND DRAINAGE OF WOUND Right 4/25/2017    Procedure: INCISION AND DRAINAGE (I&D) EXTREMITY, APPLICATION OF GRAFT;  Surgeon: Natasha Garcia DPM;  [facility];  Service:     IR BIOPSY OTHER  7/2/2020    IR LOWER EXTREMITY ANGIOGRAM  2/8/2021    IR LOWER EXTREMITY ANGIOGRAM  2/11/2021    IR NEPHROSTOMY TUBE PLACEMENT  2/25/2022    IR PORT PLACEMENT  1/17/2022    IR TUNNELED CENTRAL LINE PLACEMENT  12/24/2020    JOINT REPLACEMENT      christofer knees replaced    CA AMPUTATION METATARSAL+TOE,SINGLE Left 12/21/2020    Procedure: RAY RESECTION FOOT;  Surgeon: Zachary Sherman DPM;  [facility];  Service: Podiatry    CA AMPUTATION METATARSAL+TOE,SINGLE Left 12/31/2020    Procedure: 5TH MET RESECTION;  Surgeon: Zachary Sherman DPM;  [facility];  Service: Podiatry    CA CYSTOURETHROSCOPY W/IRRIG & EVAC CLOTS N/A 2/10/2021    Procedure: CYSTOSCOPY EVACUATION OF CLOTS, fulguration;  Surgeon: Karthik Vera MD;  [facility];  Service: Urology    CA CYSTOURETHROSCOPY W/IRRIG & EVAC CLOTS N/A 10/24/2021    Procedure: CYSTOSCOPY EVACUATION OF CLOT, fulguration of bleeding vessels, right ureter stent placement, retrograde pyelogram;  Surgeon: Aly Carvalho MD;  [facility];  Service: Urology    CA CYSTOURETHROSCOPY,BIOPSY N/A 8/16/2016    Procedure: CYSTOSCOPY WITH BIOPSIES;  Surgeon: Jimmy Gutierrez MD;  [facility];  Service: Urology    CA CYSTOURETHROSCOPY,FULGUR <0 5 CM LESN N/A 11/19/2020    Procedure: CYSTO W/BIOPSIES, transurethral prostate bx;  Surgeon: Sukumar Ochoa MD;  [facility];  Service: Urology    CA CYSTOURETHROSCOPY,FULGUR >5 CM LESN Bilateral 10/18/2021    Procedure: TRANSURETHRAL RESECTION OF BLADDER TUMOR (TURBT);   Surgeon: Joel Herrera MD;  [facility];  Service: Urology    ME Vu Gather 3RD+ ORD [address] PEL/LXTR [facility] Left 2/8/2021    Procedure: LEG angiogram, CO2 w/limited contrast with balloon angioplasty postertior tibial artery;  Surgeon: Davis Bautista MD;  [facility];  Service: Vascular    ROTATOR CUFF REPAIR      SMALL INTESTINE SURGERY      Surgery Shaji-en-Y    SPINAL FUSION      lumbar and cervical fusions    VAC DRESSING APPLICATION Right 8/25/8331    Procedure: APPLICATION VAC DRESSING;  Surgeon: Dar Aguilar DPM;  [facility];  Service:     WOUND DEBRIDEMENT Left 2/16/2021    Procedure: FOOT DEBRIDE, [address] OUT w/graft application;  Surgeon: Dar Aguilar DPM;  [facility];  Service: Podiatry     Family History   Problem Relation Age of Onset    Diabetes Mother     Heart disease Mother     Other Mother         High blood pressure    Heart disease Father     Diabetes Sister     Other Sister         High blood pressure    Kidney disease Sister     Heart disease Brother     Other Brother         High blood pressure       Doran Libman, CRNP  Date: 4/8/2022 Time: 9:40 AM

## 2022-04-08 NOTE — ASSESSMENT & PLAN NOTE
Lab Results   Component Value Date    HGBA1C 7 7 (H) 09/03/2021     Recent Labs     04/08/22  0050 04/08/22  0557 04/08/22  1129 04/08/22  1624   POCGLU 250* 178* 132 172*     · Prior to admission on glargine and lispro    Continue 10 units at bedtime with sliding scale

## 2022-04-08 NOTE — PLAN OF CARE
Problem: Potential for Falls  Goal: Patient will remain free of falls  Description: INTERVENTIONS:  - Educate patient/family on patient safety including physical limitations  - Instruct patient to call for assistance with activity   - Consult OT/PT to assist with strengthening/mobility   - Keep Call bell within reach  - Keep bed low and locked with side rails adjusted as appropriate  - Keep care items and personal belongings within reach  - Initiate and maintain comfort rounds  - Make Fall Risk Sign visible to staff  - Offer Toileting every 2 Hours, in advance of need  - Initiate/Maintain  bed alarm  - Obtain necessary fall risk management equipment:   - Apply yellow socks and bracelet for high fall risk patients  - Consider moving patient to room near nurses station  Outcome: Progressing

## 2022-04-08 NOTE — PLAN OF CARE
Problem: Potential for Falls  Goal: Patient will remain free of falls  Description: INTERVENTIONS:  - Educate patient/family on patient safety including physical limitations  - Instruct patient to call for assistance with activity   - Consult OT/PT to assist with strengthening/mobility   - Keep Call bell within reach  - Keep bed low and locked with side rails adjusted as appropriate  - Keep care items and personal belongings within reach  - Initiate and maintain comfort rounds  - Make Fall Risk Sign visible to staff  - Offer Toileting every 2 Hours, in advance of need  - Initiate/Maintain alarm  - Obtain necessary fall risk management equipment:   - Apply yellow socks and bracelet for high fall risk patients  - Consider moving patient to room near nurses station  Outcome: Progressing     Problem: MOBILITY - ADULT  Goal: Maintain or return to baseline ADL function  Description: INTERVENTIONS:  -  Assess patient's ability to carry out ADLs; assess patient's baseline for ADL function and identify physical deficits which impact ability to perform ADLs (bathing, care of mouth/teeth, toileting, grooming, dressing, etc )  - Assess/evaluate cause of self-care deficits   - Assess range of motion  - Assess patient's mobility; develop plan if impaired  - Assess patient's need for assistive devices and provide as appropriate  - Encourage maximum independence but intervene and supervise when necessary  - Involve family in performance of ADLs  - Assess for home care needs following discharge   - Consider OT consult to assist with ADL evaluation and planning for discharge  - Provide patient education as appropriate  Outcome: Progressing  Goal: Maintains/Returns to pre admission functional level  Description: INTERVENTIONS:  - Perform BMAT or MOVE assessment daily    - Set and communicate daily mobility goal to care team and patient/family/caregiver     - Collaborate with rehabilitation services on mobility goals if consulted  - Perform Range of Motion 3 times a day  - Reposition patient every 21 hours    - Dangle patient 3 times a day  - Stand patient 3 times a day  - Ambulate patient 3 times a day  - Out of bed to chair 3 times a day   - Out of bed for meals 3 times a day  - Out of bed for toileting  - Record patient progress and toleration of activity level   Outcome: Progressing     Problem: GENITOURINARY - ADULT  Goal: Maintains or returns to baseline urinary function  Description: INTERVENTIONS:  - Assess urinary function  - Encourage oral fluids to ensure adequate hydration if ordered  - Administer IV fluids as ordered to ensure adequate hydration  - Administer ordered medications as needed  - Offer frequent toileting  - Follow urinary retention protocol if ordered  Outcome: Progressing  Goal: Absence of urinary retention  Description: INTERVENTIONS:  - Assess patients ability to void and empty bladder  - Monitor I/O  - Bladder scan as needed  - Discuss with physician/AP medications to alleviate retention as needed  - Discuss catheterization for long term situations as appropriate  Outcome: Progressing  Goal: Urinary catheter remains patent  Description: INTERVENTIONS:  - Assess patency of urinary catheter  - If patient has a chronic wagner, consider changing catheter if non-functioning  - Follow guidelines for intermittent irrigation of non-functioning urinary catheter  Outcome: Progressing     Problem: METABOLIC, FLUID AND ELECTROLYTES - ADULT  Goal: Electrolytes maintained within normal limits  Description: INTERVENTIONS:  - Monitor labs and assess patient for signs and symptoms of electrolyte imbalances  - Administer electrolyte replacement as ordered  - Monitor response to electrolyte replacements, including repeat lab results as appropriate  - Instruct patient on fluid and nutrition as appropriate  Outcome: Progressing  Goal: Fluid balance maintained  Description: INTERVENTIONS:  - Monitor labs   - Monitor I/O and WT  - Instruct patient on fluid and nutrition as appropriate  - Assess for signs & symptoms of volume excess or deficit  Outcome: Progressing  Goal: Glucose maintained within target range  Description: INTERVENTIONS:  - Monitor Blood Glucose as ordered  - Assess for signs and symptoms of hyperglycemia and hypoglycemia  - Administer ordered medications to maintain glucose within target range  - Assess nutritional intake and initiate nutrition service referral as needed  Outcome: Progressing     Problem: HEMATOLOGIC - ADULT  Goal: Maintains hematologic stability  Description: INTERVENTIONS  - Assess for signs and symptoms of bleeding or hemorrhage  - Monitor labs  - Administer supportive blood products/factors as ordered and appropriate  Outcome: Progressing

## 2022-04-08 NOTE — ASSESSMENT & PLAN NOTE
· At baseline    Results from last 7 days   Lab Units 04/08/22  0932 04/07/22  0930 04/06/22  2336   BUN mg/dL 39* 44* 47*   CREATININE mg/dL 2 82* 3 12* 3 28*   EGFR ml/min/1 73sq m 20 18 16

## 2022-04-08 NOTE — PROGRESS NOTES
[facility]  Progress Note - Masha Johnson 1945, 76 y o  male MRN: [id number]  Unit/Bed#: E2 -01 Encounter: [id number]  Primary Care Provider: Deepika Wilson MD   Date and time admitted to hospital: 4/6/2022 10:30 PM    * Gross hematuria  Assessment & Plan  75-year-old gentleman with CAD, CKD, diabetes, and bladder cancer who presents with hematuria and penile pain  · Bladder cancer status post TURBT and BCG  · Bilateral nephrostomy tubes present without hematuria  · Hematuria on CBI per urology  Seems to be improving    Results from last 7 days   Lab Units 04/08/22  1030 04/07/22  1615 04/06/22  2336   HEMOGLOBIN g/dL 9 0* 9 7* 9 2*       Acute cystitis with hematuria  Assessment & Plan  · PTA urine culture serratia with multiple sensitivities  · Continue ceftriaxone  CKD (chronic kidney disease) stage 4, GFR 15-29 ml/min ([facility])  Assessment & Plan  · At baseline    Results from last 7 days   Lab Units 04/08/22  0932 04/07/22  0930 04/06/22  2336   BUN mg/dL 39* 44* 47*   CREATININE mg/dL 2 82* 3 12* 3 28*   EGFR ml/min/1 73sq m 20 18 16       Type 2 diabetes mellitus, with long-term current use of insulin [facility])  Assessment & Plan  Lab Results   Component Value Date    HGBA1C 7 7 (H) 09/03/2021     Recent Labs     04/08/22  0050 04/08/22  0557 04/08/22  1129 04/08/22  1624   POCGLU 250* 178* 132 172*     · Prior to admission on glargine and lispro  Continue 10 units at bedtime with sliding scale    Hypertension with renal disease  Assessment & Plan  · Blood pressure stable continue metoprolol    Coronary artery disease of native artery of native heart with stable angina pectoris ([address] )  Assessment & Plan  · CAD with history of stenting  No chest pain  Continue aspirin and isosorbide      VTE Pharmacologic Prophylaxis: VTE Score: 8 High Risk (Score >/= 5) - Pharmacological DVT Prophylaxis Contraindicated  Sequential Compression Devices Ordered      Patient Centered Rounds: I have performed bedside rounds with nursing staff today  Discussions with Specialists or Other Care Team Provider:     Education and Discussions with Family / Patient:     Time Spent for Care: 20 mins  More than 50% of total time spent on counseling and coordination of care as described above  Current Length of Stay: 1 day(s)  Current Patient Status: Inpatient   Certification Statement: The patient will continue to require additional inpatient hospital stay due to hematuria  Discharge Plan / Estimated Discharge Date: 24-48 hours when cleared by urology    Code Status: Prior      Subjective:   Patient seen and examined  Complains of penile pain  No chest pain or shortness breast     Objective:   Vitals: Blood pressure 131/70, pulse 68, temperature 97 9 °F (36 6 °C), temperature source Temporal, resp  rate 18, weight 103 kg (226 lb 3 1 oz), SpO2 98 %  Physical Exam  Vitals reviewed  Constitutional:       General: He is not in acute distress  Appearance: Normal appearance  HENT:      Head: Atraumatic  Cardiovascular:      Rate and Rhythm: Regular rhythm  Pulmonary:      Effort: Pulmonary effort is normal       Breath sounds: No wheezing  Abdominal:      General: Bowel sounds are normal       Palpations: Abdomen is soft  Tenderness: There is no abdominal tenderness  There is no rebound  Musculoskeletal:         General: No swelling or tenderness  Skin:     General: Skin is warm  Neurological:      General: No focal deficit present  Mental Status: He is alert  Cranial Nerves: No cranial nerve deficit     Psychiatric:         Mood and Affect: Mood normal        Additional Data:   Labs:  Results from last 7 days   Lab Units 04/08/22  1030 04/07/22  1615 04/06/22  2336   WBC Thousand/uL 7 85  --  5 61   HEMOGLOBIN g/dL 9 0* 9 7* 9 2*   HEMATOCRIT % 26 3* 28 8* 27 1*   MCV fL 95  --  95   PLATELETS Thousands/uL 150  --  152   INR   --   --  1 07     Results from last 7 days   Lab Units 04/08/22  0932 04/07/22  0930 04/06/22  2336   SODIUM mmol/L 137 137 136   POTASSIUM mmol/L 4 6 4 5 4 4   CHLORIDE mmol/L 104 104 104   CO2 mmol/L 24 25 24   ANION GAP mmol/L 9 8 8   BUN mg/dL 39* 44* 47*   CREATININE mg/dL 2 82* 3 12* 3 28*   CALCIUM mg/dL 8 7 8 7 8 7   ALBUMIN g/dL  --   --  3 0*   TOTAL BILIRUBIN mg/dL  --   --  0 29   ALK PHOS U/L  --   --  138*   ALT U/L  --   --  62   AST U/L  --   --  40   EGFR ml/min/1 73sq m 20 18 16   GLUCOSE RANDOM mg/dL 129 92 134                          Results from last 7 days   Lab Units 04/08/22  1624 04/08/22  1129 04/08/22  0557 04/08/22  0050 04/07/22  2125 04/07/22  1553 04/07/22  1154 04/07/22  0746 04/07/22  0552   POC GLUCOSE mg/dl 172* 132 178* 250* 210* 85 98 103 74             * I Have Reviewed All Lab Data Listed Above  Cultures:   Results from last 7 days   Lab Units 04/07/22  0057 04/05/22  1217   URINE CULTURE  40,000-49,000 cfu/ml Gram Negative Jose Manuel* 60,000-69,000 cfu/ml Serratia marcescens*  <10,000 cfu/ml                  Lines/Drains:  Invasive Devices  Report    Central Venous Catheter Line            Port A Cath 01/17/22 Right Internal jugular 81 days          Drain            Nephrostomy Left 2 10 2 Fr  42 days    Nephrostomy Right 1 10 2 Fr  42 days    Continuous Bladder Irrigation Three-way 1 day              Telemetry:      Imaging:  Imaging Reports Reviewed Today Include:   CT abdomen pelvis wo contrast    Result Date: 4/7/2022  Impression: 1  Interval placement of bilateral percutaneous nephrostomy tubes in place  No hydronephrosis  2   Persistent diffuse wall thickening of the urinary bladder with surrounding fat stranding likely representing known malignancy with cystitis   Workstation performed: [id number]       Scheduled Meds:  Current Facility-Administered Medications   Medication Dose Route Frequency Provider Last Rate    acetaminophen  650 mg Oral Q6H PRN Geroge Perfect, CRNP      ALPRAZolam  0 25 mg Oral TID PRN EVANGELINA Bueno      belladonna-opium  30 mg Rectal Q8H PRN Juan Fierro PA-C      bimatoprost  1 drop Both Eyes HS EVANGELINA Bueno      calcitriol  0 25 mcg Oral Daily EVANGELINA Bueno      cefTRIAXone  1,000 mg Intravenous Q24H Kaur Estrella MD 1,000 mg (04/08/22 0933)    DULoxetine  20 mg Oral Daily EVANGELINA Bueno      ezetimibe  10 mg Oral Early Morning EVANGELINA Bueno      ferrous sulfate  325 mg Oral Daily With Breakfast EVANGELINA Bueno      fluticasone  1 spray Nasal Daily PRN EVANGELINA Bueno      gabapentin  300 mg Oral HS Fabiano Villanueva DO      HYDROmorphone  0 5 mg Intravenous Q6H PRN EVANGELINA Bueno      insulin glargine  10 Units Subcutaneous HS EVANGELINA Bueno      insulin lispro  1-6 Units Subcutaneous Q6H [facility], EVANGELINA      insulin lispro  5 Units Subcutaneous TID With Meals EVANGELINA Bueno      isosorbide mononitrate  30 mg Oral Early Morning EVANGELINA Bueno      metoprolol succinate  100 mg Oral Daily EVANGELINA Bueno      multivitamin stress formula  1 tablet Oral Daily EVANGELINA Bueno      oxybutynin  10 mg Oral Daily EVANGELINA Bueno      oxyCODONE  5 mg Oral Q6H PRN EVANGELINA Bueno      pantoprazole  20 mg Oral Early Morning EVANGELINA Bueno      senna  1 tablet Oral Daily EVANGELINA Bueno      sertraline  50 mg Oral Daily EVANGELINA Bueno      tamsulosin  0 4 mg Oral HS EVANGELINA Bueno         Today, Patient Was Seen By: Rakan Liegh DO    ** Please Note: Dictation voice to text software may have been used in the creation of this document   **

## 2022-04-08 NOTE — CASE MANAGEMENT
Case Management Assessment & Discharge Planning Note    Patient name Surendra Alfred  Location East 2 /E2 MS 12-* MRN [id number]  : 1945 Date 2022       Current Admission Date: 2022  Current Admission Diagnosis:Acute cystitis with hematuria   Patient Active Problem List    Diagnosis Date Noted    CKD (chronic kidney disease) stage 4, GFR 15-29 ml/min ([facility] ) 2022    Fall 2022    Hyperkalemia 2022    Retained ureteral stent     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[address] ) 2022    Embolism and 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 ([facility] ) 2022    Abnormal CT scan, bladder 2022    Port-A-Cath in place 2022    Continuous opioid dependence ([address] ) 2021    Hematuria 10/24/2021    Acute urinary retention 10/21/2021    Chronic pain syndrome 2021    Lumbar spondylosis     Chronic bronchitis ([address] ) 2021    Moderate major depression, single episode (HCC) 2021    Thrombocytopenia ([address] ) 2021    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 2021    Gross hematuria 2021    PAD (peripheral artery disease) ([facility])     Left carotid bruit     Anemia of chronic disease 2020    Preoperative clearance 2020    Chronic anemia 10/10/2020    Diabetic ulcer of left foot associated with type 2 diabetes mellitus, 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 ([address] ) 10/08/2020    Acute kidney injury superimposed on CKD ([address] )     Dysuria 2020    Diabetic polyneuropathy associated with type 2 diabetes mellitus ([address] ) 2020    Abnormal MRI, lumbar spine 2020    Malignant neoplasm of anterior wall of urinary bladder ([address] )     Acute renal failure (ARF) ([address] ) 2020    Secondary renal hyperparathyroidism ([address] ) 2020    Acute cystitis with hematuria 2019    Preop examination 2019    Superficial phlebitis 2019    Arteriosclerosis of artery of extremity ([address] ) 02/22/2019    Stable angina pectoris ([facility] ) 02/22/2019    Herpes zoster without complication 02/17/8845    Localized edema 02/22/2019    Back pain 01/31/2019    Degeneration of lumbar intervertebral disc 12/03/2018    Primary osteoarthritis of left knee 03/16/2018    Bladder carcinoma ([facility] ) 08/16/2016    Presence of stent in coronary artery 08/16/2016    Hypertension with renal disease 10/29/2015    Hyperlipidemia 10/29/2015    Cystitis due to intravesical BCG administration 09/24/2015    Coronary artery disease of native artery of native heart with stable angina pectoris ([facility] ) 05/19/2011    Type 2 diabetes mellitus, with long-term current use of insulin ([facility] ) 01/09/2004      LOS (days): 1  Geometric Mean LOS (GMLOS) (days): 2 90  Days to GMLOS:1 5     OBJECTIVE:    Risk of Unplanned Readmission Score: 51         Current admission status: Inpatient       Preferred Pharmacy:   [address]  Phone: [phone] Fax: [address] [facility] , [address] [address]  Phone: [phone] Fax: [phone]    Primary Care Provider: Italo Combs MD    Primary Insurance: MEDICARE  Secondary Insurance: BLUE CROSS    ASSESSMENT:   [facility], [address] Representative - Spouse   Primary Phone: [phone] (Mobile)  Home Phone: [phone]               Advance Directives  Does patient have a [address]?: Yes         Readmission Root Cause  30 Day Readmission: No    Patient Information  Admitted from[de-identified] Home  Mental Status: Alert  During Assessment patient was accompanied by: Not accompanied during assessment  Assessment information provided by[de-identified] Patient  Primary Caregiver: Self  Support Systems: Spouse/significant other  [place] of Residence: [address] [place] [address] do you live in?: [address] [place] [address] entry access options  Select all that apply : Stairs  Number of steps to enter home : 10  Type of Current Residence: 2 story home  Upon entering residence, is there a bedroom on the main floor (no further steps)?: No  A bedroom is located on the following floor levels of residence (select all that apply):: Basement  Upon entering residence, is there a bathroom on the main floor (no further steps)?: No  Indicate which floors of current residence have a bathroom (select all the apply):: Basement  Number of steps to basement from main floor: One Flight  Living Arrangements: Lives w/ Spouse/significant other,Other (Comment) (Sister-in-law)    Activities of Daily Living Prior to Admission  Functional Status: Independent  Completes ADLs independently?: Yes  Ambulates independently?: Yes  Does patient use assisted devices?: Yes  Assisted Devices (DME) used: Straight Cane  Does patient currently own DME?: Yes  What DME does the patient currently own?: Straight Cane  Does patient have a history of Outpatient Therapy (PT/OT)?: No  Does the patient have a history of Short-Term Rehab?: No  Does patient have a history of HHC?: Yes (Skilled RN for wound care  Pt cannot recall name of agency )  Does patient currently have [address]?: Yes (Skilled RN for wound care    Pt cannot recall name of agency )    Current Home Health Care  Type of Current Home Care Services: Nurse visit  [address][de-identified]  (Pt cannot recall name of agency)    Patient Information Continued  Does patient receive dialysis treatments?: No  Does patient have a history of substance abuse?: No  Does patient have a history of Mental Health Diagnosis?: No         Means of Transportation  Means of [facility] [place]de[facility]] Family transport        DISCHARGE DETAILS:    Discharge planning discussed with[de-identified] Patient  Freedom of Choice: Yes     CM contacted family/caregiver?: No- see comments (Pt declined)  Were Treatment Team discharge recommendations reviewed with patient/caregiver?: Yes  Did patient/caregiver verbalize understanding of patient care needs?: Yes  Were patient/caregiver advised of the risks associated with not following Treatment Team discharge recommendations?: Yes    Contacts  Patient Contacts: Mercedes Kristen  Relationship to Patient[de-identified] Family  Contact Method: Phone  Phone Number: [phone]  Reason/Outcome: Emergency Contact    CM m/w the pt to complete an assessment  Pt lives with his spouse and sister-in-law in the basement of a 2 story house  Pt stated that he is independent with all ADLs and ambulates with a SPC  Pt does not anticipate any needs at discharge  CM will continue to follow should this change

## 2022-04-08 NOTE — NURSING NOTE
Patient right chest wall port with poor blood return and unable to flush  RN notified overnight hospitialist Ashley Salinas  Per hospitalist, cath neisha is needed and order to be placed

## 2022-04-09 LAB
ALBUMIN SERPL BCP-MCNC: 2.5 G/DL (ref 3.5–5)
ALP SERPL-CCNC: 114 U/L (ref 46–116)
ALT SERPL W P-5'-P-CCNC: 40 U/L (ref 12–78)
ANION GAP SERPL CALCULATED.3IONS-SCNC: 10 MMOL/L (ref 4–13)
AST SERPL W P-5'-P-CCNC: 23 U/L (ref 5–45)
BASOPHILS # BLD AUTO: 0.02 THOUSANDS/ΜL (ref 0–0.1)
BASOPHILS NFR BLD AUTO: 0 % (ref 0–1)
BILIRUB SERPL-MCNC: 0.26 MG/DL (ref 0.2–1)
BUN SERPL-MCNC: 44 MG/DL (ref 5–25)
CALCIUM ALBUM COR SERPL-MCNC: 9.5 MG/DL (ref 8.3–10.1)
CALCIUM SERPL-MCNC: 8.3 MG/DL (ref 8.3–10.1)
CHLORIDE SERPL-SCNC: 106 MMOL/L (ref 100–108)
CO2 SERPL-SCNC: 23 MMOL/L (ref 21–32)
CREAT SERPL-MCNC: 2.76 MG/DL (ref 0.6–1.3)
EOSINOPHIL # BLD AUTO: 0.09 THOUSAND/ΜL (ref 0–0.61)
EOSINOPHIL NFR BLD AUTO: 1 % (ref 0–6)
ERYTHROCYTE [DISTWIDTH] IN BLOOD BY AUTOMATED COUNT: 14.8 % (ref 11.6–15.1)
GFR SERPL CREATININE-BSD FRML MDRD: 20 ML/MIN/1.73SQ M
GLUCOSE SERPL-MCNC: 137 MG/DL (ref 65–140)
GLUCOSE SERPL-MCNC: 144 MG/DL (ref 65–140)
GLUCOSE SERPL-MCNC: 172 MG/DL (ref 65–140)
GLUCOSE SERPL-MCNC: 175 MG/DL (ref 65–140)
GLUCOSE SERPL-MCNC: 181 MG/DL (ref 65–140)
HCT VFR BLD AUTO: 27.3 % (ref 36.5–49.3)
HGB BLD-MCNC: 9 G/DL (ref 12–17)
IMM GRANULOCYTES # BLD AUTO: 0.04 THOUSAND/UL (ref 0–0.2)
IMM GRANULOCYTES NFR BLD AUTO: 1 % (ref 0–2)
LYMPHOCYTES # BLD AUTO: 1.33 THOUSANDS/ΜL (ref 0.6–4.47)
LYMPHOCYTES NFR BLD AUTO: 16 % (ref 14–44)
MCH RBC QN AUTO: 31.7 PG (ref 26.8–34.3)
MCHC RBC AUTO-ENTMCNC: 33 G/DL (ref 31.4–37.4)
MCV RBC AUTO: 96 FL (ref 82–98)
MONOCYTES # BLD AUTO: 0.72 THOUSAND/ΜL (ref 0.17–1.22)
MONOCYTES NFR BLD AUTO: 9 % (ref 4–12)
NEUTROPHILS # BLD AUTO: 6.28 THOUSANDS/ΜL (ref 1.85–7.62)
NEUTS SEG NFR BLD AUTO: 73 % (ref 43–75)
NRBC BLD AUTO-RTO: 0 /100 WBCS
PLATELET # BLD AUTO: 155 THOUSANDS/UL (ref 149–390)
PMV BLD AUTO: 9.9 FL (ref 8.9–12.7)
POTASSIUM SERPL-SCNC: 4.1 MMOL/L (ref 3.5–5.3)
PROT SERPL-MCNC: 6 G/DL (ref 6.4–8.2)
RBC # BLD AUTO: 2.84 MILLION/UL (ref 3.88–5.62)
SODIUM SERPL-SCNC: 139 MMOL/L (ref 136–145)
WBC # BLD AUTO: 8.48 THOUSAND/UL (ref 4.31–10.16)

## 2022-04-09 PROCEDURE — 80053 COMPREHEN METABOLIC PANEL: CPT | Performed by: INTERNAL MEDICINE

## 2022-04-09 PROCEDURE — 85025 COMPLETE CBC W/AUTO DIFF WBC: CPT | Performed by: NURSE PRACTITIONER

## 2022-04-09 PROCEDURE — 99232 SBSQ HOSP IP/OBS MODERATE 35: CPT | Performed by: INTERNAL MEDICINE

## 2022-04-09 PROCEDURE — 82948 REAGENT STRIP/BLOOD GLUCOSE: CPT

## 2022-04-09 PROCEDURE — 99232 SBSQ HOSP IP/OBS MODERATE 35: CPT | Performed by: NURSE PRACTITIONER

## 2022-04-09 RX ORDER — HYDROMORPHONE HCL/PF 1 MG/ML
0.5 SYRINGE (ML) INJECTION EVERY 4 HOURS PRN
Status: DISCONTINUED | OUTPATIENT
Start: 2022-04-09 | End: 2022-04-11

## 2022-04-09 RX ADMIN — GABAPENTIN 300 MG: 300 CAPSULE ORAL at 21:44

## 2022-04-09 RX ADMIN — INSULIN GLARGINE 10 UNITS: 100 INJECTION, SOLUTION SUBCUTANEOUS at 21:45

## 2022-04-09 RX ADMIN — TAMSULOSIN HYDROCHLORIDE 0.4 MG: 0.4 CAPSULE ORAL at 21:44

## 2022-04-09 RX ADMIN — INSULIN LISPRO 5 UNITS: 100 INJECTION, SOLUTION INTRAVENOUS; SUBCUTANEOUS at 11:45

## 2022-04-09 RX ADMIN — INSULIN LISPRO 5 UNITS: 100 INJECTION, SOLUTION INTRAVENOUS; SUBCUTANEOUS at 16:37

## 2022-04-09 RX ADMIN — HYDROMORPHONE HYDROCHLORIDE 0.5 MG: 1 INJECTION, SOLUTION INTRAMUSCULAR; INTRAVENOUS; SUBCUTANEOUS at 00:17

## 2022-04-09 RX ADMIN — METOPROLOL SUCCINATE 100 MG: 100 TABLET, EXTENDED RELEASE ORAL at 08:57

## 2022-04-09 RX ADMIN — CALCITRIOL CAPSULES 0.25 MCG 0.25 MCG: 0.25 CAPSULE ORAL at 08:57

## 2022-04-09 RX ADMIN — EZETIMIBE 10 MG: 10 TABLET ORAL at 05:27

## 2022-04-09 RX ADMIN — OXYBUTYNIN 10 MG: 10 TABLET, FILM COATED, EXTENDED RELEASE ORAL at 08:57

## 2022-04-09 RX ADMIN — ATROPA BELLADONNA AND OPIUM 1 SUPPOSITORY: 16.2; 3 SUPPOSITORY RECTAL at 09:08

## 2022-04-09 RX ADMIN — Medication 1000 MG: at 10:03

## 2022-04-09 RX ADMIN — ISOSORBIDE MONONITRATE 30 MG: 30 TABLET, EXTENDED RELEASE ORAL at 05:26

## 2022-04-09 RX ADMIN — BIMATOPROST 1 DROP: 0.1 SOLUTION/ DROPS OPHTHALMIC at 21:45

## 2022-04-09 RX ADMIN — SERTRALINE HYDROCHLORIDE 50 MG: 50 TABLET ORAL at 08:57

## 2022-04-09 RX ADMIN — INSULIN LISPRO 1 UNITS: 100 INJECTION, SOLUTION INTRAVENOUS; SUBCUTANEOUS at 11:45

## 2022-04-09 RX ADMIN — SENNOSIDES 8.6 MG: 8.6 TABLET ORAL at 08:57

## 2022-04-09 RX ADMIN — HYDROMORPHONE HYDROCHLORIDE 0.5 MG: 1 INJECTION, SOLUTION INTRAMUSCULAR; INTRAVENOUS; SUBCUTANEOUS at 11:45

## 2022-04-09 RX ADMIN — DULOXETINE HYDROCHLORIDE 20 MG: 20 CAPSULE, DELAYED RELEASE PELLETS ORAL at 09:17

## 2022-04-09 RX ADMIN — B-COMPLEX W/ C & FOLIC ACID TAB 1 TABLET: TAB at 09:17

## 2022-04-09 RX ADMIN — HYDROMORPHONE HYDROCHLORIDE 0.5 MG: 1 INJECTION, SOLUTION INTRAMUSCULAR; INTRAVENOUS; SUBCUTANEOUS at 19:24

## 2022-04-09 RX ADMIN — OXYCODONE HYDROCHLORIDE 5 MG: 5 TABLET ORAL at 08:57

## 2022-04-09 RX ADMIN — OXYCODONE HYDROCHLORIDE 5 MG: 5 TABLET ORAL at 16:41

## 2022-04-09 RX ADMIN — PANTOPRAZOLE SODIUM 20 MG: 20 TABLET, DELAYED RELEASE ORAL at 05:27

## 2022-04-09 RX ADMIN — Medication 325 MG: at 09:17

## 2022-04-09 RX ADMIN — HYDROMORPHONE HYDROCHLORIDE 0.5 MG: 1 INJECTION, SOLUTION INTRAMUSCULAR; INTRAVENOUS; SUBCUTANEOUS at 22:24

## 2022-04-09 NOTE — CASE MANAGEMENT
Case Management Discharge Planning Note    Patient name Paris Cabrales  Location [address] [address] /E2 MS 12-* MRN [id number]  : 1945 Date 2022       Current Admission Date: 2022  Current Admission Diagnosis:Gross hematuria   Patient Active Problem List    Diagnosis Date Noted    CKD (chronic kidney disease) stage 4, GFR 15-29 ml/min ([facility] ) 2022    Fall 2022    Hyperkalemia 2022    Retained ureteral stent     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[address] ) 2022    Embolism and 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 ([facility] ) 2022    Abnormal CT scan, bladder 2022    Port-A-Cath in place 2022    Continuous opioid dependence ([facility] ) 2021    Hematuria 10/24/2021    Acute urinary retention 10/21/2021    Chronic pain syndrome 2021    Lumbar spondylosis     Chronic bronchitis ([address] ) 2021    Moderate major depression, single episode (HCC) 2021    Thrombocytopenia ([address] ) 2021    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 2021    Gross hematuria 2021    PAD (peripheral artery disease) ([facility] )     Left carotid bruit     Anemia of chronic disease 2020    Preoperative clearance 2020    Chronic anemia 10/10/2020    Diabetic ulcer of left foot associated with type 2 diabetes mellitus, 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 ([facility] ) 10/08/2020    Acute kidney injury superimposed on CKD ([facility] )     Dysuria 2020    Diabetic polyneuropathy associated with type 2 diabetes mellitus ([address] ) 2020    Abnormal MRI, lumbar spine 2020    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)     Acute renal failure (ARF) ([address] ) 2020    Secondary renal hyperparathyroidism ([address] ) 2020    Acute cystitis with hematuria 2019    Preop examination 2019    Superficial phlebitis 2019    Arteriosclerosis of artery of extremity ([address] ) 2019    Stable angina pectoris ([facility] ) 02/22/2019    Herpes zoster without complication 37/80/8802    Localized edema 02/22/2019    Back pain 01/31/2019    Degeneration of lumbar intervertebral disc 12/03/2018    Primary osteoarthritis of left knee 03/16/2018    Bladder carcinoma ([facility] ) 08/16/2016    Presence of stent in coronary artery 08/16/2016    Hypertension with renal disease 10/29/2015    Hyperlipidemia 10/29/2015    Cystitis due to intravesical BCG administration 09/24/2015    Coronary artery disease of native artery of native heart with stable angina pectoris ([place] [address] ) 05/19/2011    Type 2 diabetes mellitus, with long-term current use of insulin ([place] [address] ) 01/09/2004      LOS (days): 2  Geometric Mean LOS (GMLOS) (days): 2 90  Days to GMLOS:0 4     OBJECTIVE:  Risk of Unplanned Readmission Score: 52         Current admission status: Inpatient   Preferred Pharmacy:   [address] [place] [address]  Phone: [phone] Fax: [address], [address] [facility] [address]  Phone: [phone] Fax: [phone]    Primary Care Provider: Aisha Swann MD    Primary Insurance: MEDICARE  Secondary Insurance: BLUE CROSS    DISCHARGE DETAILS:    IMM Given (Date):: 04/09/22  IMM Given to[de-identified] Patient

## 2022-04-09 NOTE — PLAN OF CARE
Problem: Potential for Falls  Goal: Patient will remain free of falls  Description: INTERVENTIONS:  - Educate patient/family on patient safety including physical limitations  - Instruct patient to call for assistance with activity   - Consult OT/PT to assist with strengthening/mobility   - Keep Call bell within reach  - Keep bed low and locked with side rails adjusted as appropriate  - Keep care items and personal belongings within reach  - Initiate and maintain comfort rounds  - Make Fall Risk Sign visible to staff  - Offer Toileting every 2Hours, in advance of need  - Initiate/Maintain bed alarm  - Obtain necessary fall risk management equipment: cane  - Apply yellow socks and bracelet for high fall risk patients  - Consider moving patient to room near nurses station  4/8/2022 2015 by Herminia Masters, RN  Outcome: Progressing  4/8/2022 0637 by Herminia Masters, RN  Outcome: Progressing

## 2022-04-09 NOTE — ASSESSMENT & PLAN NOTE
Lab Results   Component Value Date    HGBA1C 7 7 (H) 09/03/2021     Recent Labs     04/08/22  2357 04/09/22  0606 04/09/22  1137 04/09/22  1543   POCGLU 107 144* 172* 175*     · Prior to admission on glargine and lispro    Continue 10 units at bedtime with sliding scale

## 2022-04-09 NOTE — PLAN OF CARE
Problem: Potential for Falls  Goal: Patient will remain free of falls  Description: INTERVENTIONS:  - Educate patient/family on patient safety including physical limitations  - Instruct patient to call for assistance with activity   - Consult OT/PT to assist with strengthening/mobility   - Keep Call bell within reach  - Keep bed low and locked with side rails adjusted as appropriate  - Keep care items and personal belongings within reach  - Initiate and maintain comfort rounds  - Make Fall Risk Sign visible to staff  - Offer Toileting every 2   Problem: METABOLIC, FLUID AND ELECTROLYTES - ADULT  Goal: Electrolytes maintained within normal limits  Description: INTERVENTIONS:  - Monitor labs and assess patient for signs and symptoms of electrolyte imbalances  - Administer electrolyte replacement as ordered  - Monitor response to electrolyte replacements, including repeat lab results as appropriate  - Instruct patient on fluid and nutrition as appropriate  Outcome: Progressing  Goal: Fluid balance maintained  Description: INTERVENTIONS:  - Monitor labs   - Monitor I/O and WT  - Instruct patient on fluid and nutrition as appropriate  - Assess for signs & symptoms of volume excess or deficit  Outcome: Progressing  Goal: Glucose maintained within target range  Description: INTERVENTIONS:  - Monitor Blood Glucose as ordered  - Assess for signs and symptoms of hyperglycemia and hypoglycemia  - Administer ordered medications to maintain glucose within target range  - Assess nutritional intake and initiate nutrition service referral as needed  Outcome: Progressing   Hours, in advance of need  - Apply yellow socks and bracelet for high fall risk patients  - Consider moving patient to room near nurses station  Outcome: Progressing

## 2022-04-09 NOTE — ASSESSMENT & PLAN NOTE
· At baseline    Results from last 7 days   Lab Units 04/09/22  0658 04/08/22  0932 04/07/22  0930 04/06/22  2336   BUN mg/dL 44* 39* 44* 47*   CREATININE mg/dL 2 76* 2 82* 3 12* 3 28*   EGFR ml/min/1 73sq m 20 20 18 16

## 2022-04-09 NOTE — ASSESSMENT & PLAN NOTE
· Treatment evaluation at [facility]  Deemed not to be ideal surgical candidate   Received chemoradiation over the past month  · Appreciate multidisciplinary management with medical oncology, radiation oncology, and palliative medicine teams

## 2022-04-09 NOTE — ASSESSMENT & PLAN NOTE
52-year-old gentleman with CAD, CKD, diabetes, and bladder cancer who presents with hematuria and penile pain  · Bladder cancer status post TURBT and BCG  · Bilateral nephrostomy tubes present without hematuria  · Hematuria with irrigation per urology        Results from last 7 days   Lab Units 04/09/22  0958 04/08/22  1030 04/07/22  1615 04/06/22  2336   HEMOGLOBIN g/dL 9 0* 9 0* 9 7* 9 2*

## 2022-04-09 NOTE — PROGRESS NOTES
Progress Note - Urology  Paris Cabrales 1945, 76 y o  male MRN: [id number]    Unit/Bed#: E2 -01 Encounter: [id number]    Acute cystitis with hematuria  Assessment & Plan  · Gross hematuria in the setting of known bladder malignancy and acute cystitis  · Hgb stable 9 0 x 2   · Urine culture 4/5 with Serratia marcescens, susceptible to IV Rocephin  · UA in ED appears infected  Culture pending   · Continue IV Rocephin  · Wagner catheter patent with clear pink urine with small stringy clots  Patient is not tolerating any wagner manipulation or flushing, severe penile and rectal spasms on going since February   · B&O suppositories added to his bladder spasm regimen  · Will continue to follow       Malignant neoplasm of anterior wall of urinary bladder [facility])  Assessment & Plan  · Consult inpatient oncology and radiation oncology   · Consulted palliative radiation        Subjective:   Patient is a 76year old male well known to our service recurrent, BCG refractory, Stage II, [id number], 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  Also with history of CAD s/p 7 cardiac stents, CKD stage 4, DM, HTN,a nd HLD   He is s/p TURBT on 10/18/2021  Madelyn Taylor developed gross hematuria following his procedure and was taken back to the OR on 10/24/2021 for cystoscopy with clot evacuation fulguration   Right ureteral stent was placed at that time for decompression of right kidney and was left in place in anticipation of future procedures  He had bilateral nephrostomy tubes placed on 2/25/22  Right ureteral stent was subsequently removed in the OR by Dr Sarah Shanks on 3/1/22  Madelyn Taylor previously evaluated by [facility] on 02/18/2022 who did not recommend any surgical intervention at this time given his extensive comorbidities       He recently contacted our office on 4/4 with reports of gross hematuria with dysuria and bladder spasms  He presented to the ED 4/6/22  with hematuria   A 3 way wagner catheter was placed and CBI initiated  CT revealed bilateral PCNs in appropriate position without hydronephrosis  Persistent diffuse wall thickening of the bladder with surrounding fat stranding likely representing known malignancy with cystitis  Outpatient urine culture from 4/5 positive for Serratia marcescens  He is currently on IV Rocephin  He continues to have spastic urethral and rectal pain, especially exacerbated by manual irrigation or mild manipulation of wagner catheter  Patient states these spasms are not new, just worse currently  Spasms were also present with no urethral wagner in place  Objective:  Nursing Rounds: RN at bedside   Vitals: Blood pressure 132/62, pulse 85, temperature 97 8 °F (36 6 °C), temperature source Tympanic, resp  rate 18, height 6' (1 829 m), weight 103 kg (226 lb 3 1 oz), SpO2 98 %  ,Body mass index is 30 68 kg/m²  Physical Exam  Vitals reviewed  Constitutional:       Appearance: Normal appearance  He is obese  He is not ill-appearing  HENT:      Head: Normocephalic and atraumatic  Cardiovascular:      Rate and Rhythm: Normal rate and regular rhythm  Heart sounds: Normal heart sounds  Pulmonary:      Effort: Pulmonary effort is normal  No respiratory distress  Breath sounds: Normal breath sounds  Abdominal:      General: Bowel sounds are normal  There is no distension  Palpations: Abdomen is soft  Tenderness: There is no abdominal tenderness  There is no right CVA tenderness, left CVA tenderness, guarding or rebound  Comments: Bilateral PCNs patent draining clear yellow urine  Genitourinary:     Comments: Wagner catheter patent draining clear light fruit punch pink urine with some small stringy clots in bag, flushed and small flecks of clot removed, patient with severe pain and not tolerating flushing   Musculoskeletal:         General: Normal range of motion  Cervical back: Normal range of motion     Skin:     General: Skin is warm and dry  Neurological:      General: No focal deficit present  Mental Status: He is alert and oriented to person, place, and time  Psychiatric:         Mood and Affect: Mood normal          Behavior: Behavior normal          Thought Content: Thought content normal          Judgment: Judgment normal          Imaging:  Ct 1  Interval placement of bilateral percutaneous nephrostomy tubes in place  No hydronephrosis      2   Persistent diffuse wall thickening of the urinary bladder with surrounding fat stranding likely representing known malignancy with cystitis  Imaging reviewed - both report and images personally reviewed       Labs:  Recent Labs     04/06/22  2336 04/08/22  1030 04/09/22  0958   WBC 5 61 7 85 8 48       Recent Labs     04/06/22  2336 04/07/22  1615 04/08/22  1030 04/09/22  0958   HGB 9 2* 9 7* 9 0* 9 0*     Recent Labs     04/06/22  2336 04/07/22  1615 04/08/22  1030 04/09/22  0958   HCT 27 1* 28 8* 26 3* 27 3*     Recent Labs     04/06/22  2336 04/07/22  0930 04/08/22  0932 04/09/22  0658   CREATININE 3 28* 3 12* 2 82* 2 76*         History:    Past Medical History:   Diagnosis Date    Anemia     Last assessed: 9/28/17    Anxiety     Arteriosclerotic cardiovascular disease     Last assessed: 9/28/17    Arthritis     Bladder cancer ([facility] [address] )     bladder- had BCG treatments    Chronic kidney disease     Stage IV    CKD (chronic kidney disease) stage 4, GFR 15-29 ml/min ([facility])     Colon polyp     Coronary artery disease     7 stents    Depression     Diabetes mellitus ([facility])     IDDM    GERD (gastroesophageal reflux disease)     Glaucoma     Hematuria     History of fusion of cervical spine     Hyperlipidemia     Hypertension     Insomnia     Last assessed: 11/14/12    Loss of hearing     has hearing aids but usually does not wear them    Other seasonal allergic rhinitis     Last assessed: 2/10/16    PAD (peripheral artery disease) ([facility])     Shortness of breath     on exertion    Spinal stenosis of lumbar region     Transient cerebral ischemia     No Residual    Uses walker     w/c for longer distances     Social History     Socioeconomic History    Marital status: /Civil Union     Spouse name: None    Number of children: None    Years of education: None    Highest education level: None   Occupational History    None   Tobacco Use    Smoking status: Former Smoker     Packs/day: 3 00     Years: 27 00     Pack years: 81 00     Types: Cigarettes     Quit date: 1970     Years since quittin 3    Smokeless tobacco: Never Used   Vaping Use    Vaping Use: Never used   Substance and Sexual Activity    Alcohol use: Never     Comment: beer / liquor    Drug use: Not Currently     Types: Marijuana     Comment: quit 2019 had medical marijuana    Sexual activity: Not Currently   Other Topics Concern    None   Social History Narrative    Consumes 1 cup of coffee and 1 soda per day     Social Determinants of Health     Financial Resource Strain: Not on file   Food Insecurity: Not on file   Transportation Needs: Not on file   Physical Activity: Not on file   Stress: Not on file   Social Connections: Not on file   Intimate Partner Violence: Not on file   Housing Stability: Not on file     Past Surgical History:   Procedure Laterality Date    CARDIAC SURGERY      Cath stent placement  Last assessed: 3/9/17  Interventional Catheterization    CHOLECYSTECTOMY      COLONOSCOPY      CYSTOSCOPY      Diagnostic w/biopsy  Hilary Beverly  Last assessed: 14    CYSTOSCOPY W/ RETROGRADES Right 3/1/2022    Procedure: CYSTO; stent removal retrograde;  Surgeon: Emily Samuels MD;  [facility];  Service: Urology    CYSTOSCOPY W/ URETERAL STENT PLACEMENT Bilateral 10/18/2021    Procedure: bilateral retrogrades, cytology collection;  Surgeon: Emily Samuels MD;  [facility];  Service: Urology    CYSTOURETHROSCOPY      w/cautery   Hilary Beverly    FL RETROGRADE PYELOGRAM  10/18/2021    FL RETROGRADE PYELOGRAM  10/24/2021    GASTRIC BYPASS      For morbid obesity w/Shaji-en-Y   Resolved: 11/17/09    INCISION AND DRAINAGE OF WOUND Right 2/26/2017    Procedure: INCISION AND DRAINAGE (I&D) EXTREMITY WITH APPLICATION OF GRAFT JACKET;  Surgeon: Bozena Bell DPM;  [facility];  Service:     INCISION AND DRAINAGE OF WOUND Right 4/25/2017    Procedure: INCISION AND DRAINAGE (I&D) EXTREMITY, APPLICATION OF GRAFT;  Surgeon: Bozena Bell DPM;  [facility];  Service:     IR BIOPSY OTHER  7/2/2020    IR LOWER EXTREMITY ANGIOGRAM  2/8/2021    IR LOWER EXTREMITY ANGIOGRAM  2/11/2021    IR NEPHROSTOMY TUBE PLACEMENT  2/25/2022    IR PORT PLACEMENT  1/17/2022    IR TUNNELED CENTRAL LINE PLACEMENT  12/24/2020    JOINT REPLACEMENT      christofer knees replaced    MT AMPUTATION METATARSAL+TOE,SINGLE Left 12/21/2020    Procedure: RAY RESECTION FOOT;  Surgeon: Bekah Metcalf DPM;  [facility];  Service: Podiatry    MT AMPUTATION METATARSAL+TOE,SINGLE Left 12/31/2020    Procedure: 5TH MET RESECTION;  Surgeon: Bekah Metcalf DPM;  [facility];  Service: Podiatry    MT CYSTOURETHROSCOPY W/IRRIG & EVAC CLOTS N/A 2/10/2021    Procedure: CYSTOSCOPY EVACUATION OF CLOTS, fulguration;  Surgeon: Dorie Turner MD;  [facility];  Service: Urology    MT CYSTOURETHROSCOPY W/IRRIG & EVAC CLOTS N/A 10/24/2021    Procedure: CYSTOSCOPY EVACUATION OF CLOT, fulguration of bleeding vessels, right ureter stent placement, retrograde pyelogram;  Surgeon: Raghav Amos MD;  [facility];  Service: Urology    MT CYSTOURETHROSCOPY,BIOPSY N/A 8/16/2016    Procedure: CYSTOSCOPY WITH BIOPSIES;  Surgeon: Pardeep Vazquez MD;  [facility];  Service: Urology    MT CYSTOURETHROSCOPY,FULGUR <0 5 CM LESN N/A 11/19/2020    Procedure: CYSTO W/BIOPSIES, transurethral prostate bx;  Surgeon: Cheo Michel MD;  [facility]; Service: Urology    LA CYSTOURETHROSCOPY,FULGUR >5 CM LESN Bilateral 10/18/2021    Procedure: TRANSURETHRAL RESECTION OF BLADDER TUMOR (TURBT);   Surgeon: Kenisha Castro MD;  [facility];  Service: Urology    LA [place] 3RD+ ORD [address] PEL/LXTR [facility] Left 2/8/2021    Procedure: LEG angiogram, CO2 w/limited contrast with balloon angioplasty postertior tibial artery;  Surgeon: Carmen Quinn MD;  [facility];  Service: Vascular    ROTATOR CUFF REPAIR      SMALL INTESTINE SURGERY      Surgery Shaji-en-Y    SPINAL FUSION      lumbar and cervical fusions    VAC DRESSING APPLICATION Right 3/69/6658    Procedure: APPLICATION VAC DRESSING;  Surgeon: Mariah Ely DPM;  [facility];  Service:     WOUND DEBRIDEMENT Left 2/16/2021    Procedure: FOOT DEBRIDE, [address] OUT w/graft application;  Surgeon: Mairah Ely DPM;  [facility];  Service: Podiatry     Family History   Problem Relation Age of Onset    Diabetes Mother     Heart disease Mother     Other Mother         High blood pressure    Heart disease Father     Diabetes Sister     Other Sister         High blood pressure    Kidney disease Sister     Heart disease Brother     Other Brother         High blood pressure       EVANGELINA Irizarry  Date: 4/9/2022 Time: 1:36 PM

## 2022-04-09 NOTE — PROGRESS NOTES
[facility]  Progress Note - Mamдмитрий Finder 1945, 76 y o  male MRN: [id number]  Unit/Bed#: E2 -01 Encounter: [id number]  Primary Care Provider: Adams Boxer, MD   Date and time admitted to hospital: 4/6/2022 10:30 PM    * Gross hematuria  Assessment & Plan  70-year-old gentleman with CAD, CKD, diabetes, and bladder cancer who presents with hematuria and penile pain  · Bladder cancer status post TURBT and BCG  · Bilateral nephrostomy tubes present without hematuria  · Hematuria with irrigation per urology  Results from last 7 days   Lab Units 04/09/22  0958 04/08/22  1030 04/07/22  1615 04/06/22  2336   HEMOGLOBIN g/dL 9 0* 9 0* 9 7* 9 2*       Acute cystitis with hematuria  Assessment & Plan  · PTA urine culture serratia with multiple sensitivities  · Continue ceftriaxone  CKD (chronic kidney disease) stage 4, GFR 15-29 ml/min ([facility])  Assessment & Plan  · At baseline    Results from last 7 days   Lab Units 04/09/22  0658 04/08/22  0932 04/07/22  0930 04/06/22  2336   BUN mg/dL 44* 39* 44* 47*   CREATININE mg/dL 2 76* 2 82* 3 12* 3 28*   EGFR ml/min/1 73sq m 20 20 18 16       PAD (peripheral artery disease) ([facility])  Assessment & Plan  · History of PAD with history of left lower extremity angioplasty    Type 2 diabetes mellitus, with long-term current use of insulin [facility])  Assessment & Plan  Lab Results   Component Value Date    HGBA1C 7 7 (H) 09/03/2021     Recent Labs     04/08/22  2357 04/09/22  0606 04/09/22  1137 04/09/22  1543   POCGLU 107 144* 172* 175*     · Prior to admission on glargine and lispro  Continue 10 units at bedtime with sliding scale    Hypertension with renal disease  Assessment & Plan  · Blood pressure stable continue metoprolol    Coronary artery disease of native artery of native heart with stable angina pectoris ([facility] [address] )  Assessment & Plan  · CAD with history of stenting  No chest pain    Continue aspirin and isosorbide      VTE Pharmacologic Prophylaxis: VTE Score: 8 High Risk (Score >/= 5) - Pharmacological DVT Prophylaxis Contraindicated  Sequential Compression Devices Ordered  Patient Centered Rounds: I have performed bedside rounds with nursing staff today  Discussions with Specialists or Other Care Team Provider:  Urology    Education and Discussions with Family / Patient:  Patient's granddaughter    Time Spent for Care: 25 mins  More than 50% of total time spent on counseling and coordination of care as described above  Current Length of Stay: 2 day(s)  Current Patient Status: Inpatient   Certification Statement: The patient will continue to require additional inpatient hospital stay due to hematuria   Discharge Plan / Estimated Discharge Date: Anticipate discharge in 48-72 hrs to home  Code Status: Prior      Subjective:   Patient seen and examined  Still having penis pain  Objective:   Vitals: Blood pressure 126/68, pulse 59, temperature (!) 97 1 °F (36 2 °C), temperature source Tympanic, resp  rate 18, height 6' (1 829 m), weight 103 kg (226 lb 3 1 oz), SpO2 96 %  Physical Exam  Vitals reviewed  Constitutional:       General: He is not in acute distress  HENT:      Head: Atraumatic  Cardiovascular:      Rate and Rhythm: Regular rhythm  Heart sounds: Normal heart sounds  Pulmonary:      Effort: Pulmonary effort is normal       Breath sounds: Decreased breath sounds present  No wheezing  Abdominal:      Palpations: Abdomen is soft  Tenderness: There is no abdominal tenderness  There is no rebound  Musculoskeletal:         General: No swelling or tenderness  Skin:     General: Skin is dry  Neurological:      Mental Status: He is alert and oriented to person, place, and time  Cranial Nerves: No cranial nerve deficit     Psychiatric:         Mood and Affect: Mood normal        Additional Data:   Labs:  Results from last 7 days   Lab Units 04/09/22  0958 04/08/22  1030 04/07/22  1615 04/06/22  2336   WBC Thousand/uL 8 48 7 85  --  5 61   HEMOGLOBIN g/dL 9 0* 9 0* 9 7* 9 2*   HEMATOCRIT % 27 3* 26 3* 28 8* 27 1*   MCV fL 96 95  --  95   PLATELETS Thousands/uL 155 150  --  152   INR   --   --   --  1 07     Results from last 7 days   Lab Units 04/09/22  0658 04/08/22  0932 04/07/22  0930 04/06/22  2336   SODIUM mmol/L 139 137 137 136   POTASSIUM mmol/L 4 1 4 6 4 5 4 4   CHLORIDE mmol/L 106 104 104 104   CO2 mmol/L 23 24 25 24   ANION GAP mmol/L 10 9 8 8   BUN mg/dL 44* 39* 44* 47*   CREATININE mg/dL 2 76* 2 82* 3 12* 3 28*   CALCIUM mg/dL 8 3 8 7 8 7 8 7   ALBUMIN g/dL 2 5*  --   --  3 0*   TOTAL BILIRUBIN mg/dL 0 26  --   --  0 29   ALK PHOS U/L 114  --   --  138*   ALT U/L 40  --   --  62   AST U/L 23  --   --  40   EGFR ml/min/1 73sq m 20 20 18 16   GLUCOSE RANDOM mg/dL 137 129 92 134           Results from last 7 days   Lab Units 04/09/22  1543 04/09/22  1137 04/09/22  0606 04/08/22  2357 04/08/22  2051 04/08/22  1624 04/08/22  1129 04/08/22  0557 04/08/22  0050 04/07/22  2125 04/07/22  1553 04/07/22  1154   POC GLUCOSE mg/dl 175* 172* 144* 107 115 172* 132 178* 250* 210* 85 98             * I Have Reviewed All Lab Data Listed Above  Cultures:   Results from last 7 days   Lab Units 04/07/22  0057 04/05/22  1217   URINE CULTURE  40,000-49,000 cfu/ml Serratia marcescens*  10,000-19,000 cfu/ml Citrobacter freundii* 60,000-69,000 cfu/ml Serratia marcescens*  <10,000 cfu/ml                  Lines/Drains:  Invasive Devices  Report    Central Venous Catheter Line            Port A Cath 01/17/22 Right Internal jugular 82 days          Drain            Nephrostomy Left 2 10 2 Fr  43 days    Nephrostomy Right 1 10 2 Fr  43 days    Urethral Catheter 2 days              Telemetry:      Imaging:  Imaging Reports Reviewed Today Include:   CT abdomen pelvis wo contrast    Result Date: 4/7/2022  Impression: 1  Interval placement of bilateral percutaneous nephrostomy tubes in place  No hydronephrosis   2   Persistent diffuse wall thickening of the urinary bladder with surrounding fat stranding likely representing known malignancy with cystitis   Workstation performed: [id number]       Scheduled Meds:  Current Facility-Administered Medications   Medication Dose Route Frequency Provider Last Rate    acetaminophen  650 mg Oral Q6H PRN Sobeida Trista, CRNP      ALPRAZolam  0 25 mg Oral TID PRN Sobeida Trista, CRNP      belladonna-opium  30 mg Rectal Q8H PRN Chris Arauz PA-C      bimatoprost  1 drop Both Eyes HS Sobeida Trista, CRNP      calcitriol  0 25 mcg Oral Daily Sobeida Trista, CRNP      cefTRIAXone  1,000 mg Intravenous Q24H Mo Ga MD 1,000 mg (04/09/22 1003)    DULoxetine  20 mg Oral Daily Sobeida Trista, CRNP      ezetimibe  10 mg Oral Early Morning Sobeida Trista, CRNP      ferrous sulfate  325 mg Oral Daily With Breakfast Sobeida Trista, CRNP      fluticasone  1 spray Nasal Daily PRN Sobeida Trista, CRNP      gabapentin  300 mg Oral HS Fabiano Villanueva,       HYDROmorphone  0 5 mg Intravenous Q6H PRN Sobeida Trista, CRNP      insulin glargine  10 Units Subcutaneous HS Sobeida Trista, CRNP      insulin lispro  1-6 Units Subcutaneous 4x Daily (AC & HS) Fabiano Villanueva,       insulin lispro  5 Units Subcutaneous TID With Meals Sobeida Trista, CRNP      isosorbide mononitrate  30 mg Oral Early Morning Sobeida Trista, CRNP      metoprolol succinate  100 mg Oral Daily Sobeida Trista, CRNP      multivitamin stress formula  1 tablet Oral Daily Sobeida Trista, CRNP      oxybutynin  10 mg Oral Daily Sobeida Trista, CRNP      oxyCODONE  5 mg Oral Q6H PRN Sobeida Trista, CRNP      pantoprazole  20 mg Oral Early Morning Sobeida Trista, CRNP      senna  1 tablet Oral Daily Sobeida Trista, CRNP      sertraline  50 mg Oral Daily Sobeida Trista, CRNP      tamsulosin  0 4 mg Oral HS Sobeida Trista, CRNP         Today, Patient Was Seen By: Armand Monterroso DO    ** Please Note: Dictation voice to text software may have been used in the creation of this document   **

## 2022-04-10 PROBLEM — N13.30 BILATERAL HYDRONEPHROSIS: Status: ACTIVE | Noted: 2022-04-10

## 2022-04-10 LAB
ALBUMIN SERPL BCP-MCNC: 2.6 G/DL (ref 3.5–5)
ALP SERPL-CCNC: 116 U/L (ref 46–116)
ALT SERPL W P-5'-P-CCNC: 38 U/L (ref 12–78)
ANION GAP SERPL CALCULATED.3IONS-SCNC: 10 MMOL/L (ref 4–13)
AST SERPL W P-5'-P-CCNC: 20 U/L (ref 5–45)
BACTERIA UR CULT: ABNORMAL
BACTERIA UR CULT: ABNORMAL
BILIRUB SERPL-MCNC: 0.23 MG/DL (ref 0.2–1)
BUN SERPL-MCNC: 45 MG/DL (ref 5–25)
CALCIUM ALBUM COR SERPL-MCNC: 9.4 MG/DL (ref 8.3–10.1)
CALCIUM SERPL-MCNC: 8.3 MG/DL (ref 8.3–10.1)
CHLORIDE SERPL-SCNC: 104 MMOL/L (ref 100–108)
CO2 SERPL-SCNC: 24 MMOL/L (ref 21–32)
CREAT SERPL-MCNC: 2.96 MG/DL (ref 0.6–1.3)
ERYTHROCYTE [DISTWIDTH] IN BLOOD BY AUTOMATED COUNT: 14.8 % (ref 11.6–15.1)
GFR SERPL CREATININE-BSD FRML MDRD: 19 ML/MIN/1.73SQ M
GLUCOSE SERPL-MCNC: 120 MG/DL (ref 65–140)
GLUCOSE SERPL-MCNC: 141 MG/DL (ref 65–140)
GLUCOSE SERPL-MCNC: 169 MG/DL (ref 65–140)
GLUCOSE SERPL-MCNC: 199 MG/DL (ref 65–140)
GLUCOSE SERPL-MCNC: 259 MG/DL (ref 65–140)
HCT VFR BLD AUTO: 25.2 % (ref 36.5–49.3)
HGB BLD-MCNC: 8.6 G/DL (ref 12–17)
MCH RBC QN AUTO: 33 PG (ref 26.8–34.3)
MCHC RBC AUTO-ENTMCNC: 34.1 G/DL (ref 31.4–37.4)
MCV RBC AUTO: 97 FL (ref 82–98)
PLATELET # BLD AUTO: 147 THOUSANDS/UL (ref 149–390)
PMV BLD AUTO: 9.9 FL (ref 8.9–12.7)
POTASSIUM SERPL-SCNC: 4 MMOL/L (ref 3.5–5.3)
PROT SERPL-MCNC: 6.4 G/DL (ref 6.4–8.2)
RBC # BLD AUTO: 2.61 MILLION/UL (ref 3.88–5.62)
SODIUM SERPL-SCNC: 138 MMOL/L (ref 136–145)
WBC # BLD AUTO: 6.15 THOUSAND/UL (ref 4.31–10.16)

## 2022-04-10 PROCEDURE — NC001 PR NO CHARGE: Performed by: INTERNAL MEDICINE

## 2022-04-10 PROCEDURE — 85027 COMPLETE CBC AUTOMATED: CPT | Performed by: INTERNAL MEDICINE

## 2022-04-10 PROCEDURE — 80053 COMPREHEN METABOLIC PANEL: CPT | Performed by: INTERNAL MEDICINE

## 2022-04-10 PROCEDURE — 99232 SBSQ HOSP IP/OBS MODERATE 35: CPT | Performed by: INTERNAL MEDICINE

## 2022-04-10 PROCEDURE — 82948 REAGENT STRIP/BLOOD GLUCOSE: CPT

## 2022-04-10 PROCEDURE — 99232 SBSQ HOSP IP/OBS MODERATE 35: CPT | Performed by: NURSE PRACTITIONER

## 2022-04-10 RX ORDER — POLYETHYLENE GLYCOL 3350 17 G/17G
17 POWDER, FOR SOLUTION ORAL DAILY PRN
Status: DISCONTINUED | OUTPATIENT
Start: 2022-04-10 | End: 2022-04-12

## 2022-04-10 RX ADMIN — HYDROMORPHONE HYDROCHLORIDE 0.5 MG: 1 INJECTION, SOLUTION INTRAMUSCULAR; INTRAVENOUS; SUBCUTANEOUS at 06:43

## 2022-04-10 RX ADMIN — OXYBUTYNIN 10 MG: 10 TABLET, FILM COATED, EXTENDED RELEASE ORAL at 08:48

## 2022-04-10 RX ADMIN — HYDROMORPHONE HYDROCHLORIDE 0.5 MG: 1 INJECTION, SOLUTION INTRAMUSCULAR; INTRAVENOUS; SUBCUTANEOUS at 11:52

## 2022-04-10 RX ADMIN — Medication 325 MG: at 08:48

## 2022-04-10 RX ADMIN — EZETIMIBE 10 MG: 10 TABLET ORAL at 06:43

## 2022-04-10 RX ADMIN — SENNOSIDES 8.6 MG: 8.6 TABLET ORAL at 08:50

## 2022-04-10 RX ADMIN — TAMSULOSIN HYDROCHLORIDE 0.4 MG: 0.4 CAPSULE ORAL at 22:16

## 2022-04-10 RX ADMIN — SERTRALINE HYDROCHLORIDE 50 MG: 50 TABLET ORAL at 08:50

## 2022-04-10 RX ADMIN — DULOXETINE HYDROCHLORIDE 20 MG: 20 CAPSULE, DELAYED RELEASE PELLETS ORAL at 08:50

## 2022-04-10 RX ADMIN — POLYETHYLENE GLYCOL 3350 17 G: 17 POWDER, FOR SOLUTION ORAL at 22:17

## 2022-04-10 RX ADMIN — BIMATOPROST 1 DROP: 0.1 SOLUTION/ DROPS OPHTHALMIC at 22:17

## 2022-04-10 RX ADMIN — ALPRAZOLAM 0.25 MG: 0.25 TABLET ORAL at 22:16

## 2022-04-10 RX ADMIN — CALCITRIOL CAPSULES 0.25 MCG 0.25 MCG: 0.25 CAPSULE ORAL at 08:49

## 2022-04-10 RX ADMIN — OXYCODONE HYDROCHLORIDE 5 MG: 5 TABLET ORAL at 22:16

## 2022-04-10 RX ADMIN — INSULIN LISPRO 5 UNITS: 100 INJECTION, SOLUTION INTRAVENOUS; SUBCUTANEOUS at 11:47

## 2022-04-10 RX ADMIN — ALPRAZOLAM 0.25 MG: 0.25 TABLET ORAL at 02:08

## 2022-04-10 RX ADMIN — INSULIN LISPRO 5 UNITS: 100 INJECTION, SOLUTION INTRAVENOUS; SUBCUTANEOUS at 07:06

## 2022-04-10 RX ADMIN — OXYCODONE HYDROCHLORIDE 5 MG: 5 TABLET ORAL at 15:44

## 2022-04-10 RX ADMIN — OXYCODONE HYDROCHLORIDE 5 MG: 5 TABLET ORAL at 08:54

## 2022-04-10 RX ADMIN — OXYCODONE HYDROCHLORIDE 5 MG: 5 TABLET ORAL at 02:08

## 2022-04-10 RX ADMIN — INSULIN LISPRO 5 UNITS: 100 INJECTION, SOLUTION INTRAVENOUS; SUBCUTANEOUS at 16:11

## 2022-04-10 RX ADMIN — B-COMPLEX W/ C & FOLIC ACID TAB 1 TABLET: TAB at 08:50

## 2022-04-10 RX ADMIN — ISOSORBIDE MONONITRATE 30 MG: 30 TABLET, EXTENDED RELEASE ORAL at 06:43

## 2022-04-10 RX ADMIN — Medication 1000 MG: at 08:55

## 2022-04-10 RX ADMIN — GABAPENTIN 300 MG: 300 CAPSULE ORAL at 22:16

## 2022-04-10 RX ADMIN — INSULIN GLARGINE 10 UNITS: 100 INJECTION, SOLUTION SUBCUTANEOUS at 22:17

## 2022-04-10 RX ADMIN — METOPROLOL SUCCINATE 100 MG: 100 TABLET, EXTENDED RELEASE ORAL at 08:49

## 2022-04-10 RX ADMIN — HYDROMORPHONE HYDROCHLORIDE 0.5 MG: 1 INJECTION, SOLUTION INTRAMUSCULAR; INTRAVENOUS; SUBCUTANEOUS at 16:30

## 2022-04-10 RX ADMIN — PANTOPRAZOLE SODIUM 20 MG: 20 TABLET, DELAYED RELEASE ORAL at 06:43

## 2022-04-10 RX ADMIN — HYDROMORPHONE HYDROCHLORIDE 0.5 MG: 1 INJECTION, SOLUTION INTRAMUSCULAR; INTRAVENOUS; SUBCUTANEOUS at 19:45

## 2022-04-10 NOTE — PROGRESS NOTES
Progress Note - Urology  Fran Benavides 1945, 76 y o  male MRN: [id number]    Unit/Bed#: E2 -01 Encounter: [id number]    Acute cystitis with hematuria  Assessment & Plan  · Gross hematuria in the setting of known bladder malignancy and acute cystitis  · Hgb stable 9 0-- 9 0--8 6  · Urine culture 4/5 with Serratia marcescens, susceptible to IV Rocephin  · UA in ED  Culture pending   · Continue IV Rocephin  · Wagner catheter patent with clearing yellow urine in tubing, some pink tinged areas  Patient is not tolerating any wagner manipulation or flushing, severe penile and rectal spasms on going since February  Will dc wagner in am if urine remains yellow  500 cc output over 24 hours   · B&O suppositories added to his bladder spasm regimen and frequency of dilaudid decreased as patient is getting some relief now   · Will continue to follow       Malignant neoplasm of anterior wall of urinary bladder [facility])  Assessment & Plan  · Treatment evaluation at [facility]  Deemed not to be ideal surgical candidate  Received radiation with plan for concurrent chemotherapy however, chemotherapy stunted due to medical issues  · Consult inpatient oncology and radiation oncology   · Consulted palliative radiation    Bilateral hydronephrosis  Assessment & Plan  · Managed with BL PCN placed 2/25  · Patient draining clear yellow urine   · Right 650 cc  · Left 875 cc         Subjective:   HPI:  Patient states his pain is slightly improved since yesterday and feels better with the PRN dilaudid  Urine is now becoming yellow in tubing, will monitor and dc likely tomorrow  Objective:    Vitals: Blood pressure 133/71, pulse 57, temperature (!) 97 1 °F (36 2 °C), temperature source Temporal, resp  rate 18, height 6' (1 829 m), weight 103 kg (226 lb 3 1 oz), SpO2 98 %  ,Body mass index is 30 68 kg/m²  Physical Exam  Vitals reviewed  Constitutional:       Appearance: Normal appearance  He is obese   He is not ill-appearing  HENT:      Head: Normocephalic and atraumatic  Cardiovascular:      Rate and Rhythm: Normal rate and regular rhythm  Heart sounds: Normal heart sounds  Pulmonary:      Effort: Pulmonary effort is normal  No respiratory distress  Breath sounds: Normal breath sounds  Abdominal:      General: Bowel sounds are normal  There is no distension  Palpations: Abdomen is soft  Tenderness: There is no abdominal tenderness  There is no right CVA tenderness, left CVA tenderness, guarding or rebound  Comments: Bilateral PCNs patent draining clear yellow urine  Genitourinary:     Comments: Shirley catheter patent draining yellow urine in tubing with some pink tinged urine, clearing   Musculoskeletal:         General: Normal range of motion  Cervical back: Normal range of motion  Skin:     General: Skin is warm and dry  Neurological:      General: No focal deficit present  Mental Status: He is alert and oriented to person, place, and time  Psychiatric:         Mood and Affect: Mood normal          Behavior: Behavior normal          Thought Content:  Thought content normal          Judgment: Judgment normal              Labs:  Recent Labs     04/08/22  1030 04/09/22  0958 04/10/22  0634   WBC 7 85 8 48 6 15       Recent Labs     04/07/22  1615 04/08/22  1030 04/09/22  0958 04/10/22  0634   HGB 9 7* 9 0* 9 0* 8 6*     Recent Labs     04/07/22  1615 04/08/22  1030 04/09/22  0958 04/10/22  0634   HCT 28 8* 26 3* 27 3* 25 2*     Recent Labs     04/07/22  0930 04/08/22  0932 04/09/22  0658   CREATININE 3 12* 2 82* 2 76*       Urine Culture: Pending    History:    Past Medical History:   Diagnosis Date    Anemia     Last assessed: 9/28/17    Anxiety     Arteriosclerotic cardiovascular disease     Last assessed: 9/28/17    Arthritis     Bladder cancer ([facility] [address] )     bladder- had BCG treatments    Chronic kidney disease     Stage IV    CKD (chronic kidney disease) stage 4, GFR 15-29 ml/min ([facility])     Colon polyp     Coronary artery disease     7 stents    Depression     Diabetes mellitus ([facility])     IDDM    GERD (gastroesophageal reflux disease)     Glaucoma     Hematuria     History of fusion of cervical spine     Hyperlipidemia     Hypertension     Insomnia     Last assessed: 12    Loss of hearing     has hearing aids but usually does not wear them    Other seasonal allergic rhinitis     Last assessed: 2/10/16    PAD (peripheral artery disease) ([facility])     Shortness of breath     on exertion    Spinal stenosis of lumbar region     Transient cerebral ischemia     No Residual    Uses walker     w/c for longer distances     Social History     Socioeconomic History    Marital status: /Civil Union     Spouse name: None    Number of children: None    Years of education: None    Highest education level: None   Occupational History    None   Tobacco Use    Smoking status: Former Smoker     Packs/day: 3 00     Years: 27 00     Pack years: 81 00     Types: Cigarettes     Quit date:      Years since quittin 3    Smokeless tobacco: Never Used   Vaping Use    Vaping Use: Never used   Substance and Sexual Activity    Alcohol use: Never     Comment: beer / liquor    Drug use: Not Currently     Types: Marijuana     Comment: quit 2019 had medical marijuana    Sexual activity: Not Currently   Other Topics Concern    None   Social History Narrative    Consumes 1 cup of coffee and 1 soda per day     Social Determinants of Health     Financial Resource Strain: Not on file   Food Insecurity: Not on file   Transportation Needs: Not on file   Physical Activity: Not on file   Stress: Not on file   Social Connections: Not on file   Intimate Partner Violence: Not on file   Housing Stability: Not on file     Past Surgical History:   Procedure Laterality Date    CARDIAC SURGERY      Cath stent placement  Last assessed: 3/9/17   Interventional Catheterization    CHOLECYSTECTOMY      COLONOSCOPY      CYSTOSCOPY      Diagnostic w/biopsy  Amanda Hdz  Last assessed: 12/1/14    CYSTOSCOPY W/ RETROGRADES Right 3/1/2022    Procedure: CYSTO; stent removal retrograde;  Surgeon: Alivia Garcia MD;  [facility];  Service: Urology    CYSTOSCOPY W/ URETERAL STENT PLACEMENT Bilateral 10/18/2021    Procedure: bilateral retrogrades, cytology collection;  Surgeon: Alivia Garcia MD;  [facility];  Service: Urology    CYSTOURETHROSCOPY      w/cautery  Amanda Hdz    FL RETROGRADE PYELOGRAM  10/18/2021    FL RETROGRADE PYELOGRAM  10/24/2021    GASTRIC BYPASS      For morbid obesity w/Shaji-en-Y   Resolved: 11/17/09    INCISION AND DRAINAGE OF WOUND Right 2/26/2017    Procedure: INCISION AND DRAINAGE (I&D) EXTREMITY WITH APPLICATION OF GRAFT JACKET;  Surgeon: Ti Corrales DPM;  [facility];  Service:     INCISION AND DRAINAGE OF WOUND Right 4/25/2017    Procedure: INCISION AND DRAINAGE (I&D) EXTREMITY, APPLICATION OF GRAFT;  Surgeon: Ti Corrales DPM;  [facility];  Service:     IR BIOPSY OTHER  7/2/2020    IR LOWER EXTREMITY ANGIOGRAM  2/8/2021    IR LOWER EXTREMITY ANGIOGRAM  2/11/2021    IR NEPHROSTOMY TUBE PLACEMENT  2/25/2022    IR PORT PLACEMENT  1/17/2022    IR TUNNELED CENTRAL LINE PLACEMENT  12/24/2020    JOINT REPLACEMENT      christofer knees replaced    MO AMPUTATION METATARSAL+TOE,SINGLE Left 12/21/2020    Procedure: RAY RESECTION FOOT;  Surgeon: Yair Mcclelland DPM;  [facility];  Service: Podiatry    MO AMPUTATION METATARSAL+TOE,SINGLE Left 12/31/2020    Procedure: 5TH MET RESECTION;  Surgeon: Yair Mcclelland DPM;  [facility];  Service: Podiatry    MO CYSTOURETHROSCOPY W/IRRIG & EVAC CLOTS N/A 2/10/2021    Procedure: CYSTOSCOPY EVACUATION OF CLOTS, fulguration;  Surgeon: Jacy Allen MD;  [facility];  Service: Urology    MO CYSTOURETHROSCOPY W/IRRIG & EVAC CLOTS N/A 10/24/2021 Procedure: CYSTOSCOPY EVACUATION OF CLOT, fulguration of bleeding vessels, right ureter stent placement, retrograde pyelogram;  Surgeon: Lee Kuhn MD;  [facility];  Service: Urology    CA CYSTOURETHROSCOPY,BIOPSY N/A 8/16/2016    Procedure: CYSTOSCOPY WITH BIOPSIES;  Surgeon: Isabelle Burt MD;  [facility];  Service: Urology    CA CYSTOURETHROSCOPY,FULGUR <0 5 CM LESN N/A 11/19/2020    Procedure: CYSTO W/BIOPSIES, transurethral prostate bx;  Surgeon: Garrick Moss MD;  [facility];  Service: Urology    CA CYSTOURETHROSCOPY,FULGUR >5 CM LESN Bilateral 10/18/2021    Procedure: TRANSURETHRAL RESECTION OF BLADDER TUMOR (TURBT);   Surgeon: Kalina Soria MD;  [facility];  Service: Urology    CA [address] [facility] 3RD+ OR[address] [facility] Left 2/8/2021    Procedure: LEG angiogram, CO2 w/limited contrast with balloon angioplasty postertior tibial artery;  Surgeon: Rema Opitz, MD;  [facility];  Service: Vascular    ROTATOR CUFF REPAIR      SMALL INTESTINE SURGERY      Surgery Shaji-en-Y    SPINAL FUSION      lumbar and cervical fusions    VAC DRESSING APPLICATION Right 2/79/5440    Procedure: APPLICATION VAC DRESSING;  Surgeon: Evelina Dunlap DPM;  [facility];  Service:     WOUND DEBRIDEMENT Left 2/16/2021    Procedure: FOOT DEBRIDE, [address] OUT w/graft application;  Surgeon: Evelina Dunlap DPM;  [facility];  Service: Podiatry     Family History   Problem Relation Age of Onset    Diabetes Mother     Heart disease Mother     Other Mother         High blood pressure    Heart disease Father     Diabetes Sister     Other Sister         High blood pressure    Kidney disease Sister     Heart disease Brother     Other Brother         High blood pressure       EVANGELINA Grissom  Date: 4/10/2022 Time: 8:26 AM

## 2022-04-10 NOTE — ASSESSMENT & PLAN NOTE
77-year-old gentleman with CAD, CKD, diabetes, and bladder cancer who presents with hematuria and penile pain  · Bladder cancer status post TURBT and BCG  · Bilateral nephrostomy tubes present without hematuria  · Hematuria resolved with irrigation per urology     · Removed catheter when okay with urology     Results from last 7 days   Lab Units 04/10/22  0634 04/09/22  0958 04/08/22  1030 04/07/22  1615 04/06/22  2336   HEMOGLOBIN g/dL 8 6* 9 0* 9 0* 9 7* 9 2*

## 2022-04-10 NOTE — ASSESSMENT & PLAN NOTE
· At baseline    Results from last 7 days   Lab Units 04/10/22  0634 04/09/22  0658 04/08/22  0932 04/07/22  0930 04/06/22  2336   BUN mg/dL 45* 44* 39* 44* 47*   CREATININE mg/dL 2 96* 2 76* 2 82* 3 12* 3 28*   EGFR ml/min/1 73sq m 19 20 20 18 16

## 2022-04-10 NOTE — ASSESSMENT & PLAN NOTE
Lab Results   Component Value Date    HGBA1C 7 7 (H) 09/03/2021     Recent Labs     04/09/22  2110 04/10/22  0635 04/10/22  1117 04/10/22  1536   POCGLU 181* 141* 199* 259*     · Prior to admission on glargine and lispro    Continue 10 units at bedtime with sliding scale

## 2022-04-10 NOTE — ASSESSMENT & PLAN NOTE
· Urine culture with citrobacter and serratia with multiple sensitivities  · Continue ceftriaxone antibiotic day #4

## 2022-04-10 NOTE — CONSULTS
Discussed the case with our colleagues in Internal Medicine i  e Dr Villanueva  Patient with bladder cancer admitted for hematuria  Acute issue is hematuria  Patient also has what appears to be a bladder infection  At this point in terms of Oncology we have no further inpatient recommendations  Once the patient's hematuria improves or has been addressed appropriately the patient will follow up as an outpatient with Oncology for consideration of treatment  The plan as outlined by his primary oncologist was concurrent chemoradiation  He has received chemotherapy per the chart  This was 5 fluorouracil and mitomycin  His current inpatient issue is hematuria and infection  This should be addressed through Urology and they can decide how this should be managed i e  Cystoscopy versus antibiotics versus what ever urologic treatment he needs  Family has questions about hematuria and cystoscopy which can be addressed by Urology  We have no further recommendations for his hematuria from an oncology standpoint which may be related to his infection or tumor  In terms of his malignancy he should follow up as an outpatient when his acute issue i e  Hematuria has been addressed and he is stable for discharge  He will not be receiving any chemotherapy during this hospitalization  He can be transfused as needed depending on his hemoglobin  I have no further recommendations for his bladder cancer at this point and he should follow-up with his primary oncologist as an outpatient after discharge to discuss future plan of care  Dr Villanueva informed of the above and agrees  Please call with any questions  Patient should follow-up as an outpatient

## 2022-04-10 NOTE — PLAN OF CARE
Problem: Potential for Falls  Goal: Patient will remain free of falls  Description: INTERVENTIONS:  - Educate patient/family on patient safety including physical limitations  - Instruct patient to call for assistance with activity   - Consult OT/PT to assist with strengthening/mobility   - Keep Call bell within reach  - Keep bed low and locked with side rails adjusted as appropriate  - Keep care items and personal belongings within reach  - Initiate and maintain comfort rounds  - Make Fall Risk Sign visible to staff  - Offer Toileting every 2 Hours, in advance of need  - Initiate/Maintain bed alarm  - Obtain necessary fall risk management equipment: walker or cane, call bell, gripper socks, increased rounds  - Apply yellow socks and bracelet for high fall risk patients  - Consider moving patient to room near nurses station  Outcome: Progressing     Problem: GENITOURINARY - ADULT  Goal: Maintains or returns to baseline urinary function  Description: INTERVENTIONS:  - Assess urinary function  - Encourage oral fluids to ensure adequate hydration if ordered  - Administer IV fluids as ordered to ensure adequate hydration  - Administer ordered medications as needed  - Offer frequent toileting  - Follow urinary retention protocol if ordered  Outcome: Progressing  Goal: Absence of urinary retention  Description: INTERVENTIONS:  - Assess patient's ability to void and empty bladder  - Monitor I/O  - Bladder scan as needed  - Discuss with physician/AP medications to alleviate retention as needed  - Discuss catheterization for long term situations as appropriate  Outcome: Progressing  Goal: Urinary catheter remains patent  Description: INTERVENTIONS:  - Assess patency of urinary catheter  - If patient has a chronic wagner, consider changing catheter if non-functioning  - Follow guidelines for intermittent irrigation of non-functioning urinary catheter  Outcome: Progressing     Problem: PAIN - ADULT  Goal: Verbalizes/displays adequate comfort level or baseline comfort level  Description: Interventions:  - Encourage patient to monitor pain and request assistance  - Assess pain using appropriate pain scale  - Administer analgesics based on type and severity of pain and evaluate response  - Implement non-pharmacological measures as appropriate and evaluate response  - Consider cultural and social influences on pain and pain management  - Notify physician/advanced practitioner if interventions unsuccessful or patient reports new pain  Outcome: Progressing     Problem: COPING  Goal: Pt able to verbalize concerns and demonstrate effective coping strategies  Description: INTERVENTIONS:  - Assist patient to identify coping skills, available support systems and cultural and spiritual values  - Provide emotional support, including active listening and acknowledgement of concerns of patient and caregivers  - Reduce environmental stimuli, as able  - Provide patient education  - Assess for spiritual pain/suffering and initiate spiritual care, including notification of Pastoral Care or domitila based community as needed  - Assess effectiveness of coping strategies  Outcome: Progressing  Goal: Will report anxiety at manageable levels  Description: INTERVENTIONS:  - Administer medication as ordered  - Teach and encourage coping skills  - Provide emotional support  - Assess patient/family for anxiety and ability to cope  Outcome: Progressing

## 2022-04-10 NOTE — PLAN OF CARE
Problem: Potential for Falls  Goal: Patient will remain free of falls  Description: INTERVENTIONS:  - Educate patient/family on patient safety including physical limitations  - Instruct patient to call for assistance with activity   - Consult OT/PT to assist with strengthening/mobility   - Keep Call bell within reach  - Keep bed low and locked with side rails adjusted as appropriate  - Keep care items and personal belongings within reach  - Initiate and maintain comfort rounds  - Make Fall Risk Sign visible to staff  - Offer Toileting every 2 Hours, in advance of need  - Initiate/Maintain bed alarm  - Obtain necessary fall risk management equipment: walker or cane, call bell, gripper socks, increased rounds  - Apply yellow socks and bracelet for high fall risk patients  - Consider moving patient to room near nurses station  Outcome: Progressing     Problem: MOBILITY - ADULT  Goal: Maintain or return to baseline ADL function  Description: INTERVENTIONS:  -  Assess patient's ability to carry out ADLs; assess patient's baseline for ADL function and identify physical deficits which impact ability to perform ADLs (bathing, care of mouth/teeth, toileting, grooming, dressing, etc )  - Assess/evaluate cause of self-care deficits   - Assess range of motion  - Assess patient's mobility; develop plan if impaired  - Assess patient's need for assistive devices and provide as appropriate  - Encourage maximum independence but intervene and supervise when necessary  - Involve family in performance of ADLs  - Assess for home care needs following discharge   - Consider OT consult to assist with ADL evaluation and planning for discharge  - Provide patient education as appropriate  Outcome: Progressing     Problem: GENITOURINARY - ADULT  Goal: Urinary catheter remains patent  Description: INTERVENTIONS:  - Assess patency of urinary catheter  - If patient has a chronic wagner, consider changing catheter if non-functioning  - Follow guidelines for intermittent irrigation of non-functioning urinary catheter  Outcome: Progressing     Problem: METABOLIC, FLUID AND ELECTROLYTES - ADULT  Goal: Electrolytes maintained within normal limits  Description: INTERVENTIONS:  - Monitor labs and assess patient for signs and symptoms of electrolyte imbalances  - Administer electrolyte replacement as ordered  - Monitor response to electrolyte replacements, including repeat lab results as appropriate  - Instruct patient on fluid and nutrition as appropriate  Outcome: Progressing  Goal: Fluid balance maintained  Description: INTERVENTIONS:  - Monitor labs   - Monitor I/O and WT  - Instruct patient on fluid and nutrition as appropriate  - Assess for signs & symptoms of volume excess or deficit  Outcome: Progressing  Goal: Glucose maintained within target range  Description: INTERVENTIONS:  - Monitor Blood Glucose as ordered  - Assess for signs and symptoms of hyperglycemia and hypoglycemia  - Administer ordered medications to maintain glucose within target range  - Assess nutritional intake and initiate nutrition service referral as needed  Outcome: Progressing     Problem: HEMATOLOGIC - ADULT  Goal: Maintains hematologic stability  Description: INTERVENTIONS  - Assess for signs and symptoms of bleeding or hemorrhage  - Monitor labs  - Administer supportive blood products/factors as ordered and appropriate  Outcome: Progressing     Problem: COPING  Goal: Pt able to verbalize concerns and demonstrate effective coping strategies  Description: INTERVENTIONS:  - Assist patient to identify coping skills, available support systems and cultural and spiritual values  - Provide emotional support, including active listening and acknowledgement of concerns of patient and caregivers  - Reduce environmental stimuli, as able  - Provide patient education  - Assess for spiritual pain/suffering and initiate spiritual care, including notification of Pastoral Care or domitila based community as needed  - Assess effectiveness of coping strategies  Outcome: Progressing  Goal: Will report anxiety at manageable levels  Description: INTERVENTIONS:  - Administer medication as ordered  - Teach and encourage coping skills  - Provide emotional support  - Assess patient/family for anxiety and ability to cope  Outcome: Progressing

## 2022-04-10 NOTE — PROGRESS NOTES
[address] [facility]  Progress Note - Julito Garzon 1945, 76 y o  male MRN: [id number]  Unit/Bed#: E2 -01 Encounter: [id number]  Primary Care Provider: Devon Linares MD   Date and time admitted to hospital: 4/6/2022 10:30 PM    * Gross hematuria  Assessment & Plan  41-year-old gentleman with CAD, CKD, diabetes, and bladder cancer who presents with hematuria and penile pain  · Bladder cancer status post TURBT and BCG  · Bilateral nephrostomy tubes present without hematuria  · Hematuria resolved with irrigation per urology  · Removed catheter when okay with urology     Results from last 7 days   Lab Units 04/10/22  0634 04/09/22  0958 04/08/22  1030 04/07/22  1615 04/06/22  2336   HEMOGLOBIN g/dL 8 6* 9 0* 9 0* 9 7* 9 2*       Acute cystitis with hematuria  Assessment & Plan  · Urine culture with citrobacter and serratia with multiple sensitivities  · Continue ceftriaxone antibiotic day #4  CKD (chronic kidney disease) stage 4, GFR 15-29 ml/min ([facility])  Assessment & Plan  · At baseline    Results from last 7 days   Lab Units 04/10/22  0634 04/09/22  0658 04/08/22  0932 04/07/22  0930 04/06/22  2336   BUN mg/dL 45* 44* 39* 44* 47*   CREATININE mg/dL 2 96* 2 76* 2 82* 3 12* 3 28*   EGFR ml/min/1 73sq m 19 20 20 18 16       PAD (peripheral artery disease) ([facility])  Assessment & Plan  · History of PAD with history of left lower extremity angioplasty    Type 2 diabetes mellitus, with long-term current use of insulin [facility])  Assessment & Plan  Lab Results   Component Value Date    HGBA1C 7 7 (H) 09/03/2021     Recent Labs     04/09/22  2110 04/10/22  0635 04/10/22  1117 04/10/22  1536   POCGLU 181* 141* 199* 259*     · Prior to admission on glargine and lispro    Continue 10 units at bedtime with sliding scale    Hypertension with renal disease  Assessment & Plan  · Blood pressure stable continue metoprolol    Coronary artery disease of native artery of native heart with stable angina pectoris [facility])  Assessment & Plan  · CAD with history of stenting  No chest pain  Continue aspirin and isosorbide      VTE Pharmacologic Prophylaxis: VTE Score: 8 High Risk (Score >/= 5) - Pharmacological DVT Prophylaxis Contraindicated  Sequential Compression Devices Ordered  Patient Centered Rounds: I have performed bedside rounds with nursing staff today  Discussions with Specialists or Other Care Team Provider:  oncology    Education and Discussions with Family / Patient:  Spoke to granddaughter yesterday  Discussed with urology to call granddaughter today for update    Time Spent for Care: 20 mins  More than 50% of total time spent on counseling and coordination of care as described above  Current Length of Stay: 3 day(s)  Current Patient Status: Inpatient   Certification Statement: The patient will continue to require additional inpatient hospital stay due to awaiting urology clearance  Discharge Plan / Estimated Discharge Date: Anticipate discharge tomorrow to home  Code Status: Prior      Subjective:   Patient seen and examined  No new complaints, still having penis pain    Objective:   Vitals: Blood pressure 123/79, pulse 63, temperature 97 7 °F (36 5 °C), temperature source Temporal, resp  rate 18, height 6' (1 829 m), weight 103 kg (226 lb 3 1 oz), SpO2 100 %  Physical Exam  Vitals reviewed  Constitutional:       General: He is not in acute distress  Appearance: Normal appearance  HENT:      Head: Atraumatic  Cardiovascular:      Rate and Rhythm: Regular rhythm  Pulmonary:      Effort: Pulmonary effort is normal       Breath sounds: Decreased breath sounds present  No wheezing  Abdominal:      Palpations: Abdomen is soft  Tenderness: There is no abdominal tenderness  There is no rebound  Musculoskeletal:         General: No swelling  Skin:     General: Skin is warm  Neurological:      General: No focal deficit present  Mental Status: He is alert  Cranial Nerves: No cranial nerve deficit  Psychiatric:         Mood and Affect: Mood normal        Additional Data:   Labs:  Results from last 7 days   Lab Units 04/10/22  0634 04/09/22  0958 04/08/22  1030 04/07/22  1615 04/06/22  2336   WBC Thousand/uL 6 15 8 48 7 85  --  5 61   HEMOGLOBIN g/dL 8 6* 9 0* 9 0* 9 7* 9 2*   HEMATOCRIT % 25 2* 27 3* 26 3* 28 8* 27 1*   MCV fL 97 96 95  --  95   PLATELETS Thousands/uL 147* 155 150  --  152   INR   --   --   --   --  1 07     Results from last 7 days   Lab Units 04/10/22  0634 04/09/22  0658 04/08/22  0932 04/07/22  0930 04/06/22  2336   SODIUM mmol/L 138 139 137 137 136   POTASSIUM mmol/L 4 0 4 1 4 6 4 5 4 4   CHLORIDE mmol/L 104 106 104 104 104   CO2 mmol/L 24 23 24 25 24   ANION GAP mmol/L 10 10 9 8 8   BUN mg/dL 45* 44* 39* 44* 47*   CREATININE mg/dL 2 96* 2 76* 2 82* 3 12* 3 28*   CALCIUM mg/dL 8 3 8 3 8 7 8 7 8 7   ALBUMIN g/dL 2 6* 2 5*  --   --  3 0*   TOTAL BILIRUBIN mg/dL 0 23 0 26  --   --  0 29   ALK PHOS U/L 116 114  --   --  138*   ALT U/L 38 40  --   --  62   AST U/L 20 23  --   --  40   EGFR ml/min/1 73sq m 19 20 20 18 16   GLUCOSE RANDOM mg/dL 120 137 129 92 134                          Results from last 7 days   Lab Units 04/10/22  1536 04/10/22  1117 04/10/22  0635 04/09/22  2110 04/09/22  1543 04/09/22  1137 04/09/22  0606 04/08/22  2357 04/08/22  2051 04/08/22  1624 04/08/22  1129 04/08/22  0557   POC GLUCOSE mg/dl 259* 199* 141* 181* 175* 172* 144* 107 115 172* 132 178*             * I Have Reviewed All Lab Data Listed Above      Cultures:   Results from last 7 days   Lab Units 04/07/22  0057 04/05/22  1217   URINE CULTURE  40,000-49,000 cfu/ml Serratia marcescens*  10,000-19,000 cfu/ml Citrobacter freundii* 60,000-69,000 cfu/ml Serratia marcescens*  <10,000 cfu/ml                  Lines/Drains:  Invasive Devices  Report    Central Venous Catheter Line            Port A Cath 01/17/22 Right Internal jugular 83 days          Drain Nephrostomy Left 2 10 2 Fr  44 days    Nephrostomy Right 1 10 2 Fr  44 days    Urethral Catheter 3 days              Telemetry:      Imaging:  Imaging Reports Reviewed Today Include:   CT abdomen pelvis wo contrast    Result Date: 4/7/2022  Impression: 1  Interval placement of bilateral percutaneous nephrostomy tubes in place  No hydronephrosis  2   Persistent diffuse wall thickening of the urinary bladder with surrounding fat stranding likely representing known malignancy with cystitis   Workstation performed: [id number]       Scheduled Meds:  Current Facility-Administered Medications   Medication Dose Route Frequency Provider Last Rate    acetaminophen  650 mg Oral Q6H PRN EVANGELINA Carlin      ALPRAZolam  0 25 mg Oral TID PRN EVANGELINA Carlin      belladonna-opium  30 mg Rectal Q8H PRN Tess Spencer PA-C      bimatoprost  1 drop Both Eyes HS EVANGELINA Carlin      calcitriol  0 25 mcg Oral Daily EVANGELINA Carlin      cefTRIAXone  1,000 mg Intravenous Q24H Anahi Peterson MD 1,000 mg (04/10/22 0855)    DULoxetine  20 mg Oral Daily EVANGELINA Carlin      ezetimibe  10 mg Oral Early Morning EVANGELINA Carlin      ferrous sulfate  325 mg Oral Daily With Breakfast EVANGELINA Carlin      fluticasone  1 spray Nasal Daily PRN EVANGELINA Carlin      gabapentin  300 mg Oral HS Fabiano Villanueva DO      HYDROmorphone  0 5 mg Intravenous Q4H PRN Monica Reid PA-C      insulin glargine  10 Units Subcutaneous HS EVANGELINA Carlin      insulin lispro  1-6 Units Subcutaneous 4x Daily (AC & HS) Fabiano Villanueva DO      insulin lispro  5 Units Subcutaneous TID With Meals EVANGELINA Carlin      isosorbide mononitrate  30 mg Oral Early Morning EVANGELINA Carlin      metoprolol succinate  100 mg Oral Daily EVANGELINA Carlin      multivitamin stress formula  1 tablet Oral Daily EVANGELINA Carlin      oxybutynin  10 mg Oral Daily EVANGELINA Larson      oxyCODONE  5 mg Oral Q6H PRN Arnel Khanna, EVANGELINA      pantoprazole  20 mg Oral Early Morning EVANGELINA Larson      senna  1 tablet Oral Daily Arnel Khanna, EVANGELINA      sertraline  50 mg Oral Daily Arnel Khanna, EVANGELINA      tamsulosin  0 4 mg Oral HS EVANGELINA Larson         Today, Patient Was Seen By: Sonya Wu DO    ** Please Note: Dictation voice to text software may have been used in the creation of this document   **

## 2022-04-11 LAB
ALBUMIN SERPL BCP-MCNC: 2.6 G/DL (ref 3.5–5)
ALP SERPL-CCNC: 112 U/L (ref 46–116)
ALT SERPL W P-5'-P-CCNC: 34 U/L (ref 12–78)
ANION GAP SERPL CALCULATED.3IONS-SCNC: 9 MMOL/L (ref 4–13)
AST SERPL W P-5'-P-CCNC: 22 U/L (ref 5–45)
BILIRUB SERPL-MCNC: 0.24 MG/DL (ref 0.2–1)
BUN SERPL-MCNC: 48 MG/DL (ref 5–25)
CALCIUM ALBUM COR SERPL-MCNC: 9.7 MG/DL (ref 8.3–10.1)
CALCIUM SERPL-MCNC: 8.6 MG/DL (ref 8.3–10.1)
CHLORIDE SERPL-SCNC: 105 MMOL/L (ref 100–108)
CO2 SERPL-SCNC: 24 MMOL/L (ref 21–32)
CREAT SERPL-MCNC: 2.71 MG/DL (ref 0.6–1.3)
ERYTHROCYTE [DISTWIDTH] IN BLOOD BY AUTOMATED COUNT: 14.6 % (ref 11.6–15.1)
GFR SERPL CREATININE-BSD FRML MDRD: 21 ML/MIN/1.73SQ M
GLUCOSE SERPL-MCNC: 101 MG/DL (ref 65–140)
GLUCOSE SERPL-MCNC: 105 MG/DL (ref 65–140)
GLUCOSE SERPL-MCNC: 145 MG/DL (ref 65–140)
GLUCOSE SERPL-MCNC: 157 MG/DL (ref 65–140)
GLUCOSE SERPL-MCNC: 214 MG/DL (ref 65–140)
HCT VFR BLD AUTO: 24.5 % (ref 36.5–49.3)
HGB BLD-MCNC: 8.6 G/DL (ref 12–17)
MCH RBC QN AUTO: 32.3 PG (ref 26.8–34.3)
MCHC RBC AUTO-ENTMCNC: 35.1 G/DL (ref 31.4–37.4)
MCV RBC AUTO: 92 FL (ref 82–98)
PLATELET # BLD AUTO: 176 THOUSANDS/UL (ref 149–390)
PMV BLD AUTO: 10.6 FL (ref 8.9–12.7)
POTASSIUM SERPL-SCNC: 4.2 MMOL/L (ref 3.5–5.3)
PROT SERPL-MCNC: 6.5 G/DL (ref 6.4–8.2)
RBC # BLD AUTO: 2.66 MILLION/UL (ref 3.88–5.62)
SODIUM SERPL-SCNC: 138 MMOL/L (ref 136–145)
WBC # BLD AUTO: 6.03 THOUSAND/UL (ref 4.31–10.16)

## 2022-04-11 PROCEDURE — 82948 REAGENT STRIP/BLOOD GLUCOSE: CPT

## 2022-04-11 PROCEDURE — 99232 SBSQ HOSP IP/OBS MODERATE 35: CPT | Performed by: PHYSICIAN ASSISTANT

## 2022-04-11 PROCEDURE — 80053 COMPREHEN METABOLIC PANEL: CPT | Performed by: INTERNAL MEDICINE

## 2022-04-11 PROCEDURE — 99232 SBSQ HOSP IP/OBS MODERATE 35: CPT | Performed by: INTERNAL MEDICINE

## 2022-04-11 PROCEDURE — 85027 COMPLETE CBC AUTOMATED: CPT | Performed by: INTERNAL MEDICINE

## 2022-04-11 PROCEDURE — 99223 1ST HOSP IP/OBS HIGH 75: CPT | Performed by: INTERNAL MEDICINE

## 2022-04-11 RX ORDER — OXYCODONE HYDROCHLORIDE 10 MG/1
10 TABLET ORAL EVERY 6 HOURS PRN
Status: DISCONTINUED | OUTPATIENT
Start: 2022-04-11 | End: 2022-04-14 | Stop reason: HOSPADM

## 2022-04-11 RX ORDER — HYDROMORPHONE HCL/PF 1 MG/ML
0.5 SYRINGE (ML) INJECTION EVERY 4 HOURS PRN
Status: DISCONTINUED | OUTPATIENT
Start: 2022-04-11 | End: 2022-04-14 | Stop reason: HOSPADM

## 2022-04-11 RX ADMIN — OXYCODONE HYDROCHLORIDE 10 MG: 10 TABLET ORAL at 17:28

## 2022-04-11 RX ADMIN — CALCITRIOL CAPSULES 0.25 MCG 0.25 MCG: 0.25 CAPSULE ORAL at 08:29

## 2022-04-11 RX ADMIN — TAMSULOSIN HYDROCHLORIDE 0.4 MG: 0.4 CAPSULE ORAL at 21:24

## 2022-04-11 RX ADMIN — ATROPA BELLADONNA AND OPIUM 1 SUPPOSITORY: 16.2; 3 SUPPOSITORY RECTAL at 11:21

## 2022-04-11 RX ADMIN — HYDROMORPHONE HYDROCHLORIDE 0.5 MG: 1 INJECTION, SOLUTION INTRAMUSCULAR; INTRAVENOUS; SUBCUTANEOUS at 11:21

## 2022-04-11 RX ADMIN — SERTRALINE HYDROCHLORIDE 50 MG: 50 TABLET ORAL at 08:29

## 2022-04-11 RX ADMIN — Medication 325 MG: at 08:37

## 2022-04-11 RX ADMIN — EZETIMIBE 10 MG: 10 TABLET ORAL at 05:55

## 2022-04-11 RX ADMIN — Medication 1000 MG: at 08:14

## 2022-04-11 RX ADMIN — DULOXETINE HYDROCHLORIDE 20 MG: 20 CAPSULE, DELAYED RELEASE PELLETS ORAL at 08:37

## 2022-04-11 RX ADMIN — INSULIN LISPRO 5 UNITS: 100 INJECTION, SOLUTION INTRAVENOUS; SUBCUTANEOUS at 11:28

## 2022-04-11 RX ADMIN — ISOSORBIDE MONONITRATE 30 MG: 30 TABLET, EXTENDED RELEASE ORAL at 05:55

## 2022-04-11 RX ADMIN — GABAPENTIN 300 MG: 300 CAPSULE ORAL at 21:24

## 2022-04-11 RX ADMIN — INSULIN LISPRO 5 UNITS: 100 INJECTION, SOLUTION INTRAVENOUS; SUBCUTANEOUS at 08:11

## 2022-04-11 RX ADMIN — OXYCODONE HYDROCHLORIDE 5 MG: 5 TABLET ORAL at 08:29

## 2022-04-11 RX ADMIN — BIMATOPROST 1 DROP: 0.1 SOLUTION/ DROPS OPHTHALMIC at 21:24

## 2022-04-11 RX ADMIN — OXYBUTYNIN 10 MG: 10 TABLET, FILM COATED, EXTENDED RELEASE ORAL at 08:29

## 2022-04-11 RX ADMIN — HYDROMORPHONE HYDROCHLORIDE 0.5 MG: 1 INJECTION, SOLUTION INTRAMUSCULAR; INTRAVENOUS; SUBCUTANEOUS at 05:55

## 2022-04-11 RX ADMIN — B-COMPLEX W/ C & FOLIC ACID TAB 1 TABLET: TAB at 08:37

## 2022-04-11 RX ADMIN — SENNOSIDES 8.6 MG: 8.6 TABLET ORAL at 08:28

## 2022-04-11 RX ADMIN — POLYETHYLENE GLYCOL 3350 17 G: 17 POWDER, FOR SOLUTION ORAL at 08:30

## 2022-04-11 RX ADMIN — OXYCODONE HYDROCHLORIDE 10 MG: 10 TABLET ORAL at 12:56

## 2022-04-11 RX ADMIN — INSULIN LISPRO 5 UNITS: 100 INJECTION, SOLUTION INTRAVENOUS; SUBCUTANEOUS at 17:27

## 2022-04-11 RX ADMIN — PANTOPRAZOLE SODIUM 20 MG: 20 TABLET, DELAYED RELEASE ORAL at 05:55

## 2022-04-11 RX ADMIN — OXYCODONE HYDROCHLORIDE 10 MG: 10 TABLET ORAL at 23:26

## 2022-04-11 NOTE — ASSESSMENT & PLAN NOTE
78-year-old gentleman with CAD, CKD, diabetes, and bladder cancer who presents with hematuria in setting of cystitis and penile pain  · Bladder cancer status post TURBT and BCG, bilateral nephrostomy tubes  · Following with Rolan Liu, not a radical cystectomy candidate due to comorbidities  · Hematuria improving, given that Shirley may be contributing to significant penile pain urology has opted to removed today  · Plan for cystoscopy 4/12/2022, NPO at midnight  · Appreciate palliative care recommendations for pain control

## 2022-04-11 NOTE — CONSULTS
Consultation - Palliative & Supportive Care   Gurdeep Whittaker  76 y o   male  [address] [address] /E2 MS 12-*   MRN: [id number]  Encounter: [id number]    ASSESSMENT:   76 M with urothelial bladder cancer s/p TURBT, s/p radiation and chemotherapy with cancer related pain likely increased due to current cystitis       Patient Active Problem List   Diagnosis    Coronary artery disease of native artery of native heart with stable angina pectoris ([address] )    Bladder carcinoma ([address] )    Hypertension with renal disease    Hyperlipidemia    Presence of stent in coronary artery    Type 2 diabetes mellitus, with long-term current use of insulin ([facility])    Primary osteoarthritis of left knee    Cystitis due to intravesical BCG administration    Degeneration of lumbar intervertebral disc    Back pain    Arteriosclerosis of artery of extremity ([address] )    Stable angina pectoris (HCC)    Herpes zoster without complication    Localized edema    Superficial phlebitis    Preop examination    Acute cystitis with hematuria    Acute renal failure (ARF) ([address] )    Secondary renal hyperparathyroidism ([address] )    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)    Abnormal MRI, lumbar spine    Diabetic polyneuropathy associated with type 2 diabetes mellitus ([address] )    Dysuria    Diabetic ulcer of left foot associated with type 2 diabetes mellitus, 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)    Acute kidney injury superimposed on CKD (HCC)    Chronic anemia    Anemia of chronic disease    Preoperative clearance    PAD (peripheral artery disease) (HCC)    Left carotid bruit    Gross hematuria    Chronic bronchitis (HCC)    Moderate major depression, single episode (HCC)    Thrombocytopenia (HCC)    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    Lumbar spondylosis    Chronic pain syndrome    Acute urinary retention    Hematuria    Continuous opioid dependence ([address] )    Port-A-Cath in place    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[facility] )    Embolism and 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 (HCC)    Abnormal CT scan, bladder    Retained ureteral stent    Fall    Hyperkalemia    CKD (chronic kidney disease) stage 4, GFR 15-29 ml/min (HCC)    Bilateral hydronephrosis       Active problems addressed:    Consult is for cancer related chronic pain  Official recommendations to follow in Attending attestation  PLAN:  1  Goals:    To continue treatment directed therapy  o Will speak with his Oncology team after discharge regarding plan moving forward  Will also speak with the team at [facility]  Code status: Level 1 - Full Code   Decisional apparatus:  Patient does have capacity to make medical decisions on my exam today  If such capacity is lost, patient's substitute decision maker would default to Onofre Osullivan Representative by [place] 169  Advance Directive / Living Will / POLST:  None in the chart  2  Social Support:   Supportive listening provided  3  Symptom management:   Continue current prn pain regimen:   o Dilaudid 0 5 mg q4 prn breakthrough  o Oxycodone 5mg q6 prn moderate pain  o Oxycodone 10mg q6 prn for severe pain  o Encouraged oral medications before having to go to the dilaudid    4  Will follow with Palliative outpatient- might require long acting opioids    Controlled Substance Review    [place]MP or [place]  reviewed: No red flags were identified; safe to proceed with prescription         I have reviewed the patient's controlled substance dispensing history in the Prescription Drug Monitoring Program in compliance with the [facility] regulations before prescribing any controlled substances  We appreciate the opportunity to participate in this patient's care  We will continue to follow  Please do not hesitate to contact our on-call provider through our clinic answering service at [phone] should you have acute symptom control concerns      IDENTIFICATION:        Inpatient consult to Palliative Care     Performed by  Avani Hillman DO     Authorized by Consuelo Caripo MD            Reason for Consult / Principal Problem: Cancer related pain    HISTORY OF PRESENT ILLNESS:    Surendra Alfred is a 76 y o  male with urothelial bladder cancer originally diagnosed in 1994 s/p TURBT (October 2021), s/p bilateral nephrostomy tubes who presented due to dysuria and hematuria  Found to have cystitis, being treated with antibiotics while inpatient  He completed treatment last week with radiation and chemotherapy  He saw palliative care on 3/25 in the office  He describes severe penile and rectal spasms, especially with urination and thus was prescribed oxycodone 5mg - 10mg  Today, he states his pain is about the same as it has been at home, but he is able to take the oxycodone more frequently here than at home  At home he is limited to 3 pills per day, not covering the whole day  Inpatient, he is on dilaudid 0 5mg q4 prn and Oxycodone 5mg and 10mg q6 prn  He also has B&O supoository, Miralax ordered for opioid induced constipation  He has been moving his bowels, especially when he was having CBI  Review of Systems   Gastrointestinal: Positive for rectal pain  Negative for constipation and diarrhea  Genitourinary: Positive for dysuria, hematuria (resolving) and penile pain  All other systems reviewed and are negative        Past Medical History:   Diagnosis Date    Anemia     Last assessed: 9/28/17    Anxiety     Arteriosclerotic cardiovascular disease     Last assessed: 9/28/17    Arthritis     Bladder cancer ([facility] )     bladder- had BCG treatments    Chronic kidney disease     Stage IV    CKD (chronic kidney disease) stage 4, GFR 15-29 ml/min ([facility])     Colon polyp     Coronary artery disease     7 stents    Depression     Diabetes mellitus (HCC)     IDDM    GERD (gastroesophageal reflux disease)     Glaucoma     Hematuria     History of fusion of cervical spine  Hyperlipidemia     Hypertension     Insomnia     Last assessed: 11/14/12    Loss of hearing     has hearing aids but usually does not wear them    Other seasonal allergic rhinitis     Last assessed: 2/10/16    PAD (peripheral artery disease) (HCC)     Shortness of breath     on exertion    Spinal stenosis of lumbar region     Transient cerebral ischemia     No Residual    Uses walker     w/c for longer distances     Past Surgical History:   Procedure Laterality Date    CARDIAC SURGERY      Cath stent placement  Last assessed: 3/9/17  Interventional Catheterization    CHOLECYSTECTOMY      COLONOSCOPY      CYSTOSCOPY      Diagnostic w/biopsy  Ahmet Cleverly  Last assessed: 12/1/14    CYSTOSCOPY W/ RETROGRADES Right 3/1/2022    Procedure: CYSTO; stent removal retrograde;  Surgeon: Mishel Love MD;  [facility];  Service: Urology    CYSTOSCOPY W/ URETERAL STENT PLACEMENT Bilateral 10/18/2021    Procedure: bilateral retrogrades, cytology collection;  Surgeon: Mishel Love MD;  [facility];  Service: Urology    CYSTOURETHROSCOPY      w/cautery  Ahmet Cleverly    FL RETROGRADE PYELOGRAM  10/18/2021    FL RETROGRADE PYELOGRAM  10/24/2021    GASTRIC BYPASS      For morbid obesity w/Shaji-en-Y   Resolved: 11/17/09    INCISION AND DRAINAGE OF WOUND Right 2/26/2017    Procedure: INCISION AND DRAINAGE (I&D) EXTREMITY WITH APPLICATION OF GRAFT JACKET;  Surgeon: Evan Contreras DPM;  [facility];  Service:     INCISION AND DRAINAGE OF WOUND Right 4/25/2017    Procedure: INCISION AND DRAINAGE (I&D) EXTREMITY, APPLICATION OF GRAFT;  Surgeon: Evan Contreras DPM;  [facility];  Service:     IR BIOPSY OTHER  7/2/2020    IR LOWER EXTREMITY ANGIOGRAM  2/8/2021    IR LOWER EXTREMITY ANGIOGRAM  2/11/2021    IR NEPHROSTOMY TUBE PLACEMENT  2/25/2022    IR PORT PLACEMENT  1/17/2022    IR TUNNELED CENTRAL LINE PLACEMENT  12/24/2020    JOINT REPLACEMENT      christofer knees replaced    TN AMPUTATION METATARSAL+TOE,SINGLE Left 12/21/2020    Procedure: RAY RESECTION FOOT;  Surgeon: Caro Servin DPM;  [facility];  Service: Podiatry    TN AMPUTATION METATARSAL+TOE,SINGLE Left 12/31/2020    Procedure: 5TH MET RESECTION;  Surgeon: Caro Servin DPM;  [facility];  Service: Podiatry    TN CYSTOURETHROSCOPY W/IRRIG & EVAC CLOTS N/A 2/10/2021    Procedure: CYSTOSCOPY EVACUATION OF CLOTS, fulguration;  Surgeon: Irais Keller MD;  [facility];  Service: Urology    TN CYSTOURETHROSCOPY W/IRRIG & EVAC CLOTS N/A 10/24/2021    Procedure: CYSTOSCOPY EVACUATION OF CLOT, fulguration of bleeding vessels, right ureter stent placement, retrograde pyelogram;  Surgeon: Frances Centeno MD;  [facility];  Service: Urology    TN CYSTOURETHROSCOPY,BIOPSY N/A 8/16/2016    Procedure: CYSTOSCOPY WITH BIOPSIES;  Surgeon: Maria E Castaneda MD;  [facility];  Service: Urology    TN CYSTOURETHROSCOPY,FULGUR <0 5 CM LESN N/A 11/19/2020    Procedure: CYSTO W/BIOPSIES, transurethral prostate bx;  Surgeon: Judith Alfaro MD;  [facility];  Service: Urology    TN CYSTOURETHROSCOPY,FULGUR >5 CM LESN Bilateral 10/18/2021    Procedure: TRANSURETHRAL RESECTION OF BLADDER TUMOR (TURBT);   Surgeon: Alia Amezcua MD;  [facility];  Service: Urology    TN [address] [facility] 3RD+ ORD SLCTV ABDL PEL/LXTR [facility] Left 2/8/2021    Procedure: LEG angiogram, CO2 w/limited contrast with balloon angioplasty postertior tibial artery;  Surgeon: Machelle Seay MD;  [facility];  Service: Vascular    ROTATOR CUFF REPAIR      SMALL INTESTINE SURGERY      Surgery Shaji-en-Y    SPINAL FUSION      lumbar and cervical fusions    VAC DRESSING APPLICATION Right 8/27/1386    Procedure: APPLICATION VAC DRESSING;  Surgeon: Patrice Hernandez DPM;  [facility];  Service:    Ailyn Carvalho DEBRIDEMENT Left 2/16/2021    Procedure: FOOT DEBRIDE, [address] OUT w/graft application;  Surgeon: Dar Aguilar DPM;  [facility];  Service: Podiatry     Social History     Socioeconomic History    Marital status: /Civil Union     Spouse name: Not on file    Number of children: Not on file    Years of education: Not on file    Highest education level: Not on file   Occupational History    Not on file   Tobacco Use    Smoking status: Former Smoker     Packs/day: 3 00     Years: 27 00     Pack years: 81 00     Types: Cigarettes     Quit date: 1970     Years since quittin 3    Smokeless tobacco: Never Used   Vaping Use    Vaping Use: Never used   Substance and Sexual Activity    Alcohol use: Never     Comment: beer / liquor    Drug use: Not Currently     Types: Marijuana     Comment: quit 2019 had medical marijuana    Sexual activity: Not Currently   Other Topics Concern    Not on file   Social History Narrative    Consumes 1 cup of coffee and 1 soda per day     Social Determinants of Health     Financial Resource Strain: Not on file   Food Insecurity: Not on file   Transportation Needs: Not on file   Physical Activity: Not on file   Stress: Not on file   Social Connections: Not on file   Intimate Partner Violence: Not on file   Housing Stability: Not on file     Family History   Problem Relation Age of Onset    Diabetes Mother     Heart disease Mother     Other Mother         High blood pressure    Heart disease Father     Diabetes Sister     Other Sister         High blood pressure    Kidney disease Sister     Heart disease Brother     Other Brother         High blood pressure       MEDICATIONS / ALLERGIES:  all current active meds have been reviewed and current meds:   Current Facility-Administered Medications   Medication Dose Route Frequency    acetaminophen (TYLENOL) tablet 650 mg  650 mg Oral Q6H PRN    ALPRAZolam (XANAX) tablet 0 25 mg  0 25 mg Oral TID PRN    belladonna-opium (B&O SUPPOSITORY) 16 2-30 mg suppository 1 suppository  30 mg Rectal Q8H PRN    bimatoprost (LUMIGAN) 0 01 % ophthalmic solution 1 drop  1 drop Both Eyes HS    calcitriol (ROCALTROL) capsule 0 25 mcg  0 25 mcg Oral Daily    ceftriaxone (ROCEPHIN) 1 g/50 mL in dextrose IVPB  1,000 mg Intravenous Q24H    DULoxetine (CYMBALTA) delayed release capsule 20 mg  20 mg Oral Daily    ezetimibe (ZETIA) tablet 10 mg  10 mg Oral Early Morning    ferrous sulfate tablet 325 mg  325 mg Oral Daily With Breakfast    fluticasone (FLONASE) 50 mcg/act nasal spray 1 spray  1 spray Nasal Daily PRN    gabapentin (NEURONTIN) capsule 300 mg  300 mg Oral HS    HYDROmorphone (DILAUDID) injection 0 5 mg  0 5 mg Intravenous Q4H PRN    insulin glargine (LANTUS) subcutaneous injection 10 Units 0 1 mL  10 Units Subcutaneous HS    insulin lispro (HumaLOG) 100 units/mL subcutaneous injection 1-6 Units  1-6 Units Subcutaneous 4x Daily (AC & HS)    insulin lispro (HumaLOG) 100 units/mL subcutaneous injection 5 Units  5 Units Subcutaneous TID With Meals    isosorbide mononitrate (IMDUR) 24 hr tablet 30 mg  30 mg Oral Early Morning    metoprolol succinate (TOPROL-XL) 24 hr tablet 100 mg  100 mg Oral Daily    multivitamin stress formula tablet 1 tablet  1 tablet Oral Daily    oxybutynin (DITROPAN-XL) 24 hr tablet 10 mg  10 mg Oral Daily    oxyCODONE (ROXICODONE) IR tablet 5 mg  5 mg Oral Q6H PRN    pantoprazole (PROTONIX) EC tablet 20 mg  20 mg Oral Early Morning    polyethylene glycol (MIRALAX) packet 17 g  17 g Oral Daily PRN    senna (SENOKOT) tablet 8 6 mg  1 tablet Oral Daily    sertraline (ZOLOFT) tablet 50 mg  50 mg Oral Daily    tamsulosin (FLOMAX) capsule 0 4 mg  0 4 mg Oral HS       Allergies   Allergen Reactions    Atorvastatin Hives, Itching and Rash    Simvastatin Rash and Edema     Edema of lower legs    Statins Hives and Itching    Insulin Lispro Swelling and Edema     " Lower Legs"    Other Itching, Rash and Other (See Comments)     "EKG Patches"   "blue EKG patches" OBJECTIVE:  BP 95/54 (BP Location: Left arm)   Pulse 61   Temp (!) 96 4 °F (35 8 °C) (Tympanic)   Resp 20   Ht 6' (1 829 m)   Wt 103 kg (226 lb 3 1 oz)   SpO2 99%   BMI 30 68 kg/m²   Physical Exam:  Constitutional: Appears well-developed and well-nourished  In no acute physical or emotional distress  Head: Normocephalic and atraumatic  Eyes: EOM are normal  No ocular discharge  No scleral icterus  Neck: No visible adenopathy or masses  Respiratory: Effort normal  No stridor  No respiratory distress  Gastrointestinal: No abdominal distension  : has wagner, b/l nephrostomy tubes  Musculoskeletal: No edema  Neurological: Alert, oriented and appropriately conversant  Skin: Dry, no diaphoresis  Psychiatric: Displays a normal mood and affect  Behavior, judgment and thought content appear normal      Lab Results:   I have personally reviewed pertinent labs  , CBC:   Lab Results   Component Value Date    WBC 6 03 04/11/2022    HGB 8 6 (L) 04/11/2022    HCT 24 5 (L) 04/11/2022    MCV 92 04/11/2022     04/11/2022    MCH 32 3 04/11/2022    MCHC 35 1 04/11/2022    RDW 14 6 04/11/2022    MPV 10 6 04/11/2022   , BMP:  Lab Results   Component Value Date    SODIUM 138 04/11/2022    K 4 2 04/11/2022     04/11/2022    CO2 24 04/11/2022    BUN 48 (H) 04/11/2022    CREATININE 2 71 (H) 04/11/2022    GLUC 101 04/11/2022    CALCIUM 8 6 04/11/2022    AGAP 9 04/11/2022    EGFR 21 04/11/2022     Imaging Studies: I have personally reviewed pertinent reports  EKG, Pathology, and Other Studies: I have personally reviewed pertinent reports  Counseling / Coordination of Care  Total floor / unit time spent today 45 minutes  Greater than 50% of total time was spent with the patient and / or family counseling and / or coordination of care   A description of the counseling / coordination of care: provided medical updates, discussed palliative care, discussed hospice care, determined competency, determined goals of care, determined POA, determined social/family support, discussed plans of care, discussed symptom management, provided psychosocial support       Balta Thakur, [address] [facility]

## 2022-04-11 NOTE — ASSESSMENT & PLAN NOTE
Lab Results   Component Value Date    HGBA1C 7 7 (H) 09/03/2021     Recent Labs     04/10/22  1536 04/10/22  2034 04/11/22  0611 04/11/22  1126   POCGLU 259* 169* 105 145*     · Continue 10 units of Lantus at bedtime, 5 units of Humalog t i d   With meals

## 2022-04-11 NOTE — ASSESSMENT & PLAN NOTE
· Known bladder carcinoma followed outpatient by Urology and Oncology  · Evaluated by inpatient Oncology  · Outpatient follow-up with primary oncologist

## 2022-04-11 NOTE — PROGRESS NOTES
Progress Note - Urology  Walter Boland 1945, 76 y o  male MRN: [id number]    Unit/Bed#: E2 -01 Encounter: [id number]    Assessment & Plan  Recurrent (1994 to current) BCG refractory bladder cancer, most recent pathology progressed to muscle invasive disease as of October 2021  Bilateral ureteral obstruction managed with bilateral nephrostomy tubes since February 2022 and doing well  Second opinion [facility]- not radical cystectomy candidate due to renal cardiac and bariatric comorbidities  Followed by urology there now and due to see them back end of this month  Has opted for concurrent chemoradiation as treatment  Not yet completed, in progress here at [facility]  Last cystoscopy 5 weeks ago for purpose of stent removal as that was felt to possibly be contributing to penile pain during radiation, no significant tumor burden/findings aside from particulate urine and diffuse irritation  Plan was to repeat office cystoscopy 6 weeks after completion of XRT  Receiving antibiotics for serratia bacteriuria/uti at present  Intermittent penile pain and hematuria for several months despite treatment modalities  Oral analgesia and b&o suppository for analgesia is well tolerated  Wagner catheter will be removed today  Due to ongoing hematuria and readmissions a diagnostic cystoscopy will be performed this admission, Tuesday 4/12/22 discussed with Dr Maddy Cobb  Goal diagnostic not therapeutic, but will depend on intraoperative findings  NPO at midnight tonight for OR Tuesday 4/12/22 with Dr Maddy Cobb    Subjective: some blood in wagner urine this morning again but clearing up now, urine from nephrostomies is clear yellow  No fevers or chills  No diarrhea  Penile/pelvic pain unchanged for several months  Review of Systems   Constitutional: Negative for activity change, appetite change, chills, fever and unexpected weight change  HENT: Negative  Respiratory: Negative  Negative for shortness of breath  Cardiovascular: Negative  Negative for chest pain  Gastrointestinal: Negative for abdominal pain, diarrhea, nausea and vomiting  Endocrine: Negative  Genitourinary: Positive for hematuria and penile pain  Negative for decreased urine volume, difficulty urinating, dysuria, flank pain, frequency, scrotal swelling, testicular pain and urgency  Musculoskeletal: Negative for back pain and gait problem  Skin: Negative  Allergic/Immunologic: Negative  Neurological: Negative  Hematological: Negative for adenopathy  Does not bruise/bleed easily  Objective:  Vitals: Blood pressure 95/54, pulse 61, temperature (!) 96 4 °F (35 8 °C), temperature source Tympanic, resp  rate 20, height 6' (1 829 m), weight 103 kg (226 lb 3 1 oz), SpO2 99 %  ,Body mass index is 30 68 kg/m²  Intake/Output Summary (Last 24 hours) at 4/11/2022 1311  Last data filed at 4/11/2022 1101  Gross per 24 hour   Intake 40 ml   Output 1775 ml   Net -1735 ml     Invasive Devices  Report    Central Venous Catheter Line            Port A Cath 01/17/22 Right Internal jugular 84 days          Drain            Nephrostomy Left 2 10 2 Fr  45 days    Nephrostomy Right 1 10 2 Fr  45 days    Urethral Catheter 4 days                Physical Exam  Vitals and nursing note reviewed  Constitutional:       General: He is not in acute distress  Appearance: He is well-developed  He is not diaphoretic  Comments: Black male seated upright in bedside chair no distress, conversive pleasant   HENT:      Head: Normocephalic and atraumatic  Cardiovascular:      Rate and Rhythm: Normal rate and regular rhythm  Heart sounds: Normal heart sounds  No murmur heard  Pulmonary:      Effort: Pulmonary effort is normal       Breath sounds: Normal breath sounds  Abdominal:      General: Bowel sounds are normal       Palpations: Abdomen is soft  There is no mass  Tenderness: There is no abdominal tenderness   There is no right CVA tenderness, left CVA tenderness or guarding  Comments: Bilateral nephrostomy tubes with clear yellow urine   Genitourinary:     Comments: Shirley catheter per urethra draining yellow/orange urine in tubing, clear  Orange/rust in bag  Musculoskeletal:         General: Normal range of motion  Skin:     General: Skin is warm and dry  Capillary Refill: Capillary refill takes less than 2 seconds  Coloration: Skin is not pale  Neurological:      Mental Status: He is alert and oriented to person, place, and time        Gait: Gait normal    Psychiatric:         Speech: Speech normal          Behavior: Behavior normal          Labs:  Recent Labs     04/09/22 0958 04/10/22  0634 04/11/22  0550   WBC 8 48 6 15 6 03     Recent Labs     04/09/22  0958 04/10/22  0634 04/11/22  0550   HGB 9 0* 8 6* 8 6*       Recent Labs     04/09/22  0658 04/10/22  0634 04/11/22  0550   CREATININE 2 76* 2 96* 2 71*       History:    Past Medical History:   Diagnosis Date    Anemia     Last assessed: 9/28/17    Anxiety     Arteriosclerotic cardiovascular disease     Last assessed: 9/28/17    Arthritis     Bladder cancer ([facility])     bladder- had BCG treatments    Chronic kidney disease     Stage IV    CKD (chronic kidney disease) stage 4, GFR 15-29 ml/min ([facility])     Colon polyp     Coronary artery disease     7 stents    Depression     Diabetes mellitus ([facility])     IDDM    GERD (gastroesophageal reflux disease)     Glaucoma     Hematuria     History of fusion of cervical spine     Hyperlipidemia     Hypertension     Insomnia     Last assessed: 11/14/12    Loss of hearing     has hearing aids but usually does not wear them    Other seasonal allergic rhinitis     Last assessed: 2/10/16    PAD (peripheral artery disease) ([facility])     Shortness of breath     on exertion    Spinal stenosis of lumbar region     Transient cerebral ischemia     No Residual    Uses walker     w/c for longer distances     Past Surgical History:   Procedure Laterality Date    CARDIAC SURGERY      Cath stent placement  Last assessed: 3/9/17  Interventional Catheterization    CHOLECYSTECTOMY      COLONOSCOPY      CYSTOSCOPY      Diagnostic w/biopsy  Ledell Males  Last assessed: 12/1/14    CYSTOSCOPY W/ RETROGRADES Right 3/1/2022    Procedure: CYSTO; stent removal retrograde;  Surgeon: Anton Hankins MD;  [facility];  Service: Urology    CYSTOSCOPY W/ URETERAL STENT PLACEMENT Bilateral 10/18/2021    Procedure: bilateral retrogrades, cytology collection;  Surgeon: Anton Hankins MD;  [facility];  Service: Urology    CYSTOURETHROSCOPY      w/cautery  Ledell Males    FL RETROGRADE PYELOGRAM  10/18/2021    FL RETROGRADE PYELOGRAM  10/24/2021    GASTRIC BYPASS      For morbid obesity w/Shaji-en-Y   Resolved: 11/17/09    INCISION AND DRAINAGE OF WOUND Right 2/26/2017    Procedure: INCISION AND DRAINAGE (I&D) EXTREMITY WITH APPLICATION OF GRAFT JACKET;  Surgeon: Eusebia Benedict DPM;  [facility];  Service:     INCISION AND DRAINAGE OF WOUND Right 4/25/2017    Procedure: INCISION AND DRAINAGE (I&D) EXTREMITY, APPLICATION OF GRAFT;  Surgeon: Eusebia Benedict DPM;  [facility];  Service:     IR BIOPSY OTHER  7/2/2020    IR LOWER EXTREMITY ANGIOGRAM  2/8/2021    IR LOWER EXTREMITY ANGIOGRAM  2/11/2021    IR NEPHROSTOMY TUBE PLACEMENT  2/25/2022    IR PORT PLACEMENT  1/17/2022    IR TUNNELED CENTRAL LINE PLACEMENT  12/24/2020    JOINT REPLACEMENT      christofer knees replaced    DC AMPUTATION METATARSAL+TOE,SINGLE Left 12/21/2020    Procedure: RAY RESECTION FOOT;  Surgeon: Dickson Rojas DPM;  [facility];  Service: Podiatry    DC AMPUTATION METATARSAL+TOE,SINGLE Left 12/31/2020    Procedure: 5TH MET RESECTION;  Surgeon: Dickson Rojas DPM;  [facility];  Service: Podiatry    DC CYSTOURETHROSCOPY W/IRRIG & EVAC CLOTS N/A 2/10/2021    Procedure: CYSTOSCOPY EVACUATION OF CLOTS, fulguration;  Surgeon: Travis Mcclelland MD;  [facility];  Service: Urology    ME CYSTOURETHROSCOPY W/IRRIG & EVAC CLOTS N/A 10/24/2021    Procedure: CYSTOSCOPY EVACUATION OF CLOT, fulguration of bleeding vessels, right ureter stent placement, retrograde pyelogram;  Surgeon: Ivet Morales MD;  [facility];  Service: Urology    ME CYSTOURETHROSCOPY,BIOPSY N/A 8/16/2016    Procedure: CYSTOSCOPY WITH BIOPSIES;  Surgeon: Willow Rivera MD;  [facility];  Service: Urology    ME CYSTOURETHROSCOPY,FULGUR <0 5 CM LESN N/A 11/19/2020    Procedure: CYSTO W/BIOPSIES, transurethral prostate bx;  Surgeon: Zeina Underwood MD;  [facility];  Service: Urology    ME CYSTOURETHROSCOPY,FULGUR >5 CM LESN Bilateral 10/18/2021    Procedure: TRANSURETHRAL RESECTION OF BLADDER TUMOR (TURBT);   Surgeon: Ashley Kwan MD;  [facility];  Service: Urology    ME Mahesh Havers 3RD+ ORD SLCTV ABDL PEL/LXTR [facility] Left 2/8/2021    Procedure: LEG angiogram, CO2 w/limited contrast with balloon angioplasty postertior tibial artery;  Surgeon: Mickey Baeza MD;  [facility];  Service: Vascular    ROTATOR CUFF REPAIR      SMALL INTESTINE SURGERY      Surgery Shaji-en-Y    SPINAL FUSION      lumbar and cervical fusions    VAC DRESSING APPLICATION Right 3/07/8212    Procedure: APPLICATION VAC DRESSING;  Surgeon: Zaira Page DPM;  [facility];  Service:     WOUND DEBRIDEMENT Left 2/16/2021    Procedure: FOOT DEBRIDE, [facility] OUT w/graft application;  Surgeon: Zaira Page DPM;  [facility];  Service: Podiatry     Family History   Problem Relation Age of Onset    Diabetes Mother     Heart disease Mother     Other Mother         High blood pressure    Heart disease Father     Diabetes Sister     Other Sister         High blood pressure    Kidney disease Sister     Heart disease Brother     Other Brother         High blood pressure     Social History Socioeconomic History    Marital status: /Civil Union     Spouse name: None    Number of children: None    Years of education: None    Highest education level: None   Occupational History    None   Tobacco Use    Smoking status: Former Smoker     Packs/day: 3 00     Years: 27 00     Pack years: 81 00     Types: Cigarettes     Quit date: 1970     Years since quittin 3    Smokeless tobacco: Never Used   Vaping Use    Vaping Use: Never used   Substance and Sexual Activity    Alcohol use: Never     Comment: beer / liquor    Drug use: Not Currently     Types: Marijuana     Comment: quit 2019 had medical marijuana    Sexual activity: Not Currently   Other Topics Concern    None   Social History Narrative    Consumes 1 cup of coffee and 1 soda per day     Social Determinants of Health     Financial Resource Strain: Not on file   Food Insecurity: Not on file   Transportation Needs: Not on file   Physical Activity: Not on file   Stress: Not on file   Social Connections: Not on file   Intimate Partner Violence: Not on file   Housing Stability: Not on file         Regina Barnes  Date: 2022 Time: 1:11 PM

## 2022-04-11 NOTE — PROGRESS NOTES
[address]  Progress Note - Gerald Scherer 1945, 76 y o  male MRN: [id number]  Unit/Bed#: E2 -01 Encounter: [id number]  Primary Care Provider: Sonja Meadows MD   Date and time admitted to hospital: 4/6/2022 10:30 PM    * Gross hematuria  Assessment & Plan  68-year-old gentleman with CAD, CKD, diabetes, and bladder cancer who presents with hematuria in setting of cystitis and penile pain  · Bladder cancer status post TURBT and BCG, bilateral nephrostomy tubes  · Following with Maricarmen Lewis, not a radical cystectomy candidate due to comorbidities  · Hematuria improving, given that Shirley may be contributing to significant penile pain urology has opted to removed today  · Plan for cystoscopy 4/12/2022, NPO at midnight  · Appreciate palliative care recommendations for pain control    CKD (chronic kidney disease) stage 4, GFR 15-29 ml/min ([facility])  Assessment & Plan  · Currently at baseline    Malignant neoplasm of anterior wall of urinary bladder ([facility] [address] )  Assessment & Plan  · Known bladder carcinoma followed outpatient by Urology and Oncology  · Evaluated by inpatient Oncology  · Outpatient follow-up with primary oncologist    Acute cystitis with hematuria  Assessment & Plan  · Urine culture with citrobacter and serratia with multiple sensitivities  · Continue ceftriaxone     Type 2 diabetes mellitus, with long-term current use of insulin [facility])  Assessment & Plan  Lab Results   Component Value Date    HGBA1C 7 7 (H) 09/03/2021     Recent Labs     04/10/22  1536 04/10/22  2034 04/11/22  0611 04/11/22  1126   POCGLU 259* 169* 105 145*     · Continue 10 units of Lantus at bedtime, 5 units of Humalog t i d   With meals    Hypertension with renal disease  Assessment & Plan  · Continue metoprolol     VTE Pharmacologic Prophylaxis:  On hold given hematuria    Patient Centered Rounds:  Patient care rounds were performed with nursing    Discussions with Specialists or Other Care Team Provider: Urology    Time Spent for Care: 30  More than 50% of total time spent on counseling and coordination of care as described above  Current Length of Stay: 4 day(s)    Current Patient Status: Inpatient   Certification Statement: The patient will continue to require additional inpatient hospital stay due to inpatient procedure    Discharge Plan:  Pending OR tomorrow for cystoscopy    Code Status: Level 1 - Full Code      Subjective:   Patient seen evaluated at bedside  Reports significant penile pain  Minimal improvement with oxycodone 5 mg  Objective:     Vitals:   Temp (24hrs), Av 4 °F (36 3 °C), Min:96 4 °F (35 8 °C), Max:98 1 °F (36 7 °C)    Temp:  [96 4 °F (35 8 °C)-98 1 °F (36 7 °C)] 96 4 °F (35 8 °C)  HR:  [61-71] 61  Resp:  [18-20] 20  BP: ()/(54-83) 95/54  SpO2:  [97 %-100 %] 99 %  Body mass index is 30 68 kg/m²  Input and Output Summary (last 24 hours): Intake/Output Summary (Last 24 hours) at 2022 1421  Last data filed at 2022 1101  Gross per 24 hour   Intake 40 ml   Output 1775 ml   Net -1735 ml       Physical Exam:     Physical Exam  Vitals reviewed  Constitutional:       General: He is not in acute distress  Appearance: He is well-developed  He is not ill-appearing, toxic-appearing or diaphoretic  HENT:      Head: Normocephalic and atraumatic  Mouth/Throat:      Mouth: Mucous membranes are moist       Pharynx: No oropharyngeal exudate  Eyes:      General: No scleral icterus  Extraocular Movements: Extraocular movements intact  Conjunctiva/sclera: Conjunctivae normal    Cardiovascular:      Rate and Rhythm: Normal rate and regular rhythm  Heart sounds: Normal heart sounds  Pulmonary:      Effort: Pulmonary effort is normal  No respiratory distress  Breath sounds: Normal breath sounds  No wheezing or rales  Abdominal:      General: There is no distension  Palpations: Abdomen is soft  Tenderness:  There is no abdominal tenderness  There is no guarding or rebound  Musculoskeletal:         General: No swelling, tenderness or deformity  Skin:     General: Skin is warm and dry  Neurological:      General: No focal deficit present  Mental Status: He is alert  Mental status is at baseline  Psychiatric:         Mood and Affect: Mood normal          Behavior: Behavior normal          Thought Content: Thought content normal          Judgment: Judgment normal          Additional Data:     Labs: I have reviewed pertinent results     Results from last 7 days   Lab Units 04/11/22  0550 04/10/22  0634 04/09/22  0958   WBC Thousand/uL 6 03   < > 8 48   HEMOGLOBIN g/dL 8 6*   < > 9 0*   HEMATOCRIT % 24 5*   < > 27 3*   PLATELETS Thousands/uL 176   < > 155   NEUTROS PCT %  --   --  73   LYMPHS PCT %  --   --  16   MONOS PCT %  --   --  9   EOS PCT %  --   --  1    < > = values in this interval not displayed       Results from last 7 days   Lab Units 04/11/22  0550   SODIUM mmol/L 138   POTASSIUM mmol/L 4 2   CHLORIDE mmol/L 105   CO2 mmol/L 24   BUN mg/dL 48*   CREATININE mg/dL 2 71*   ANION GAP mmol/L 9   CALCIUM mg/dL 8 6   ALBUMIN g/dL 2 6*   TOTAL BILIRUBIN mg/dL 0 24   ALK PHOS U/L 112   ALT U/L 34   AST U/L 22   GLUCOSE RANDOM mg/dL 101     Results from last 7 days   Lab Units 04/06/22  2336   INR  1 07     Results from last 7 days   Lab Units 04/11/22  1126 04/11/22  0611 04/10/22  2034 04/10/22  1536 04/10/22  1117 04/10/22  0635 04/09/22  2110 04/09/22  1543 04/09/22  1137 04/09/22  0606 04/08/22  2357 04/08/22  2051   POC GLUCOSE mg/dl 145* 105 169* 259* 199* 141* 181* 175* 172* 144* 107 115                 Imaging: I have reviewed pertinent imaging       Recent Cultures (last 7 days):     Results from last 7 days   Lab Units 04/07/22  0057 04/05/22  1217   URINE CULTURE  40,000-49,000 cfu/ml Serratia marcescens*  10,000-19,000 cfu/ml Citrobacter freundii* 60,000-69,000 cfu/ml Serratia marcescens*  <10,000 cfu/ml Last 24 Hours Medication List:   Current Facility-Administered Medications   Medication Dose Route Frequency Provider Last Rate    acetaminophen  650 mg Oral Q6H PRN Azalee Cloud, CRNP      ALPRAZolam  0 25 mg Oral TID PRN Azalee Cloud, CRNP      belladonna-opium  30 mg Rectal Q8H PRN Simran Evans PA-C      bimatoprost  1 drop Both Eyes HS Azalee Cloud, CRNP      calcitriol  0 25 mcg Oral Daily Azalee Cloud, CRNP      cefTRIAXone  1,000 mg Intravenous Q24H Gemma Lin MD 1,000 mg (04/11/22 0814)    DULoxetine  20 mg Oral Daily Azalee Cloud, CRNP      ezetimibe  10 mg Oral Early Morning Azalee Cloud, CRNP      ferrous sulfate  325 mg Oral Daily With Breakfast Azalee Cloud, CRNP      fluticasone  1 spray Nasal Daily PRN Azalee Cloud, CRNP      gabapentin  300 mg Oral HS Nicholas Helm, DO      HYDROmorphone  0 5 mg Intravenous Q4H PRN Ami Corolla, DO      insulin glargine  10 Units Subcutaneous HS Azalee Huerfano, CRNP      insulin lispro  1-6 Units Subcutaneous 4x Daily (AC & HS) Fabiano Villanueva, DO      insulin lispro  5 Units Subcutaneous TID With Meals Azalee Cloud, CRNP      isosorbide mononitrate  30 mg Oral Early Morning Azalee Cloud, CRNP      metoprolol succinate  100 mg Oral Daily Azalee Cloud, CRNP      multivitamin stress formula  1 tablet Oral Daily Azalee Cloud, CRNP      oxybutynin  10 mg Oral Daily Azalee Cloud, CRNP      oxyCODONE  10 mg Oral Q6H PRN Ami Corolla, DO      oxyCODONE  5 mg Oral Q6H PRN Azalee Cloud, CRNP      pantoprazole  20 mg Oral Early Morning Azalee Cloud, CRNP      polyethylene glycol  17 g Oral Daily PRN Edison Lowery PA-C      senna  1 tablet Oral Daily Azalee Cloud, CRNP      sertraline  50 mg Oral Daily Azalee Cloud, CRNP      tamsulosin  0 4 mg Oral HS Azalee Huerfano, CRNP          Today, Patient Was Seen By: Paradise Perez Parag Bauer, DO    ** Please Note: Dictation voice to text software may have been used in the creation of this document   **

## 2022-04-11 NOTE — PLAN OF CARE
Problem: GENITOURINARY - ADULT  Goal: Maintains or returns to baseline urinary function  Description: INTERVENTIONS:  - Assess urinary function  - Encourage oral fluids to ensure adequate hydration if ordered  - Administer IV fluids as ordered to ensure adequate hydration  - Administer ordered medications as needed  - Offer frequent toileting  - Follow urinary retention protocol if ordered  Outcome: Progressing normal...

## 2022-04-12 ENCOUNTER — ANESTHESIA EVENT (INPATIENT)
Dept: PERIOP | Facility: HOSPITAL | Age: 77
DRG: 669 | End: 2022-04-12
Payer: MEDICARE

## 2022-04-12 ENCOUNTER — ANESTHESIA (INPATIENT)
Dept: PERIOP | Facility: HOSPITAL | Age: 77
DRG: 669 | End: 2022-04-12
Payer: MEDICARE

## 2022-04-12 LAB
ANION GAP SERPL CALCULATED.3IONS-SCNC: 10 MMOL/L (ref 4–13)
BUN SERPL-MCNC: 52 MG/DL (ref 5–25)
CALCIUM SERPL-MCNC: 8.7 MG/DL (ref 8.3–10.1)
CHLORIDE SERPL-SCNC: 104 MMOL/L (ref 100–108)
CO2 SERPL-SCNC: 23 MMOL/L (ref 21–32)
CREAT SERPL-MCNC: 2.93 MG/DL (ref 0.6–1.3)
ERYTHROCYTE [DISTWIDTH] IN BLOOD BY AUTOMATED COUNT: 14.8 % (ref 11.6–15.1)
GFR SERPL CREATININE-BSD FRML MDRD: 19 ML/MIN/1.73SQ M
GLUCOSE SERPL-MCNC: 167 MG/DL (ref 65–140)
GLUCOSE SERPL-MCNC: 171 MG/DL (ref 65–140)
GLUCOSE SERPL-MCNC: 184 MG/DL (ref 65–140)
GLUCOSE SERPL-MCNC: 184 MG/DL (ref 65–140)
GLUCOSE SERPL-MCNC: 374 MG/DL (ref 65–140)
HCT VFR BLD AUTO: 26.3 % (ref 36.5–49.3)
HGB BLD-MCNC: 8.8 G/DL (ref 12–17)
MCH RBC QN AUTO: 31.8 PG (ref 26.8–34.3)
MCHC RBC AUTO-ENTMCNC: 33.5 G/DL (ref 31.4–37.4)
MCV RBC AUTO: 95 FL (ref 82–98)
PLATELET # BLD AUTO: 187 THOUSANDS/UL (ref 149–390)
PMV BLD AUTO: 9.8 FL (ref 8.9–12.7)
POTASSIUM SERPL-SCNC: 4.4 MMOL/L (ref 3.5–5.3)
RBC # BLD AUTO: 2.77 MILLION/UL (ref 3.88–5.62)
SODIUM SERPL-SCNC: 137 MMOL/L (ref 136–145)
WBC # BLD AUTO: 5.51 THOUSAND/UL (ref 4.31–10.16)

## 2022-04-12 PROCEDURE — 82948 REAGENT STRIP/BLOOD GLUCOSE: CPT

## 2022-04-12 PROCEDURE — 99232 SBSQ HOSP IP/OBS MODERATE 35: CPT | Performed by: PHYSICIAN ASSISTANT

## 2022-04-12 PROCEDURE — 52204 CYSTOSCOPY W/BIOPSY(S): CPT | Performed by: UROLOGY

## 2022-04-12 PROCEDURE — 99232 SBSQ HOSP IP/OBS MODERATE 35: CPT | Performed by: INTERNAL MEDICINE

## 2022-04-12 PROCEDURE — 88305 TISSUE EXAM BY PATHOLOGIST: CPT | Performed by: PATHOLOGY

## 2022-04-12 PROCEDURE — 85027 COMPLETE CBC AUTOMATED: CPT | Performed by: INTERNAL MEDICINE

## 2022-04-12 PROCEDURE — 0TBB8ZX EXCISION OF BLADDER, VIA NATURAL OR ARTIFICIAL OPENING ENDOSCOPIC, DIAGNOSTIC: ICD-10-PCS | Performed by: UROLOGY

## 2022-04-12 PROCEDURE — 88342 IMHCHEM/IMCYTCHM 1ST ANTB: CPT | Performed by: PATHOLOGY

## 2022-04-12 PROCEDURE — 80048 BASIC METABOLIC PNL TOTAL CA: CPT | Performed by: INTERNAL MEDICINE

## 2022-04-12 PROCEDURE — 0TBD8ZX EXCISION OF URETHRA, VIA NATURAL OR ARTIFICIAL OPENING ENDOSCOPIC, DIAGNOSTIC: ICD-10-PCS | Performed by: UROLOGY

## 2022-04-12 PROCEDURE — 99233 SBSQ HOSP IP/OBS HIGH 50: CPT | Performed by: INTERNAL MEDICINE

## 2022-04-12 RX ORDER — DOCUSATE SODIUM 100 MG/1
100 CAPSULE, LIQUID FILLED ORAL 2 TIMES DAILY
Status: DISCONTINUED | OUTPATIENT
Start: 2022-04-12 | End: 2022-04-14 | Stop reason: HOSPADM

## 2022-04-12 RX ORDER — PROPOFOL 10 MG/ML
INJECTION, EMULSION INTRAVENOUS AS NEEDED
Status: DISCONTINUED | OUTPATIENT
Start: 2022-04-12 | End: 2022-04-12

## 2022-04-12 RX ORDER — POLYETHYLENE GLYCOL 3350 17 G/17G
17 POWDER, FOR SOLUTION ORAL DAILY
Status: DISCONTINUED | OUTPATIENT
Start: 2022-04-13 | End: 2022-04-14 | Stop reason: HOSPADM

## 2022-04-12 RX ORDER — FENTANYL CITRATE 50 UG/ML
INJECTION, SOLUTION INTRAMUSCULAR; INTRAVENOUS AS NEEDED
Status: DISCONTINUED | OUTPATIENT
Start: 2022-04-12 | End: 2022-04-12

## 2022-04-12 RX ORDER — SENNOSIDES 8.6 MG
2 TABLET ORAL DAILY
Status: DISCONTINUED | OUTPATIENT
Start: 2022-04-13 | End: 2022-04-14 | Stop reason: HOSPADM

## 2022-04-12 RX ORDER — HYDROMORPHONE HCL/PF 1 MG/ML
0.5 SYRINGE (ML) INJECTION
Status: DISCONTINUED | OUTPATIENT
Start: 2022-04-12 | End: 2022-04-12 | Stop reason: HOSPADM

## 2022-04-12 RX ORDER — ONDANSETRON 2 MG/ML
4 INJECTION INTRAMUSCULAR; INTRAVENOUS ONCE AS NEEDED
Status: DISCONTINUED | OUTPATIENT
Start: 2022-04-12 | End: 2022-04-12 | Stop reason: HOSPADM

## 2022-04-12 RX ORDER — SODIUM CHLORIDE 9 MG/ML
INJECTION, SOLUTION INTRAVENOUS CONTINUOUS PRN
Status: DISCONTINUED | OUTPATIENT
Start: 2022-04-12 | End: 2022-04-12

## 2022-04-12 RX ORDER — LIDOCAINE HYDROCHLORIDE 20 MG/ML
INJECTION, SOLUTION EPIDURAL; INFILTRATION; INTRACAUDAL; PERINEURAL AS NEEDED
Status: DISCONTINUED | OUTPATIENT
Start: 2022-04-12 | End: 2022-04-12

## 2022-04-12 RX ORDER — MAGNESIUM HYDROXIDE 1200 MG/15ML
LIQUID ORAL AS NEEDED
Status: DISCONTINUED | OUTPATIENT
Start: 2022-04-12 | End: 2022-04-12 | Stop reason: HOSPADM

## 2022-04-12 RX ORDER — INSULIN GLARGINE 100 [IU]/ML
10 INJECTION, SOLUTION SUBCUTANEOUS
Status: DISCONTINUED | OUTPATIENT
Start: 2022-04-12 | End: 2022-04-14 | Stop reason: HOSPADM

## 2022-04-12 RX ORDER — FENTANYL CITRATE/PF 50 MCG/ML
25 SYRINGE (ML) INJECTION
Status: COMPLETED | OUTPATIENT
Start: 2022-04-12 | End: 2022-04-12

## 2022-04-12 RX ADMIN — DULOXETINE HYDROCHLORIDE 20 MG: 20 CAPSULE, DELAYED RELEASE PELLETS ORAL at 09:23

## 2022-04-12 RX ADMIN — HYDROMORPHONE HYDROCHLORIDE 0.5 MG: 1 INJECTION, SOLUTION INTRAMUSCULAR; INTRAVENOUS; SUBCUTANEOUS at 05:15

## 2022-04-12 RX ADMIN — FENTANYL CITRATE 25 MCG: 50 INJECTION INTRAMUSCULAR; INTRAVENOUS at 17:24

## 2022-04-12 RX ADMIN — SENNOSIDES 8.6 MG: 8.6 TABLET ORAL at 09:22

## 2022-04-12 RX ADMIN — FENTANYL CITRATE 50 MCG: 50 INJECTION INTRAMUSCULAR; INTRAVENOUS at 16:44

## 2022-04-12 RX ADMIN — ALPRAZOLAM 0.25 MG: 0.25 TABLET ORAL at 22:35

## 2022-04-12 RX ADMIN — FENTANYL CITRATE 50 MCG: 50 INJECTION INTRAMUSCULAR; INTRAVENOUS at 16:41

## 2022-04-12 RX ADMIN — PANTOPRAZOLE SODIUM 20 MG: 20 TABLET, DELAYED RELEASE ORAL at 05:11

## 2022-04-12 RX ADMIN — ISOSORBIDE MONONITRATE 30 MG: 30 TABLET, EXTENDED RELEASE ORAL at 05:11

## 2022-04-12 RX ADMIN — ACETAMINOPHEN 325MG 650 MG: 325 TABLET ORAL at 21:32

## 2022-04-12 RX ADMIN — HYDROMORPHONE HYDROCHLORIDE 0.5 MG: 1 INJECTION, SOLUTION INTRAMUSCULAR; INTRAVENOUS; SUBCUTANEOUS at 17:44

## 2022-04-12 RX ADMIN — DOCUSATE SODIUM 100 MG: 100 CAPSULE, LIQUID FILLED ORAL at 18:26

## 2022-04-12 RX ADMIN — OXYCODONE HYDROCHLORIDE 10 MG: 10 TABLET ORAL at 09:22

## 2022-04-12 RX ADMIN — CALCITRIOL CAPSULES 0.25 MCG 0.25 MCG: 0.25 CAPSULE ORAL at 09:22

## 2022-04-12 RX ADMIN — B-COMPLEX W/ C & FOLIC ACID TAB 1 TABLET: TAB at 09:23

## 2022-04-12 RX ADMIN — SERTRALINE HYDROCHLORIDE 50 MG: 50 TABLET ORAL at 09:22

## 2022-04-12 RX ADMIN — BIMATOPROST 1 DROP: 0.1 SOLUTION/ DROPS OPHTHALMIC at 21:34

## 2022-04-12 RX ADMIN — Medication 325 MG: at 09:28

## 2022-04-12 RX ADMIN — FENTANYL CITRATE 25 MCG: 50 INJECTION INTRAMUSCULAR; INTRAVENOUS at 17:30

## 2022-04-12 RX ADMIN — OXYBUTYNIN 10 MG: 10 TABLET, FILM COATED, EXTENDED RELEASE ORAL at 09:22

## 2022-04-12 RX ADMIN — FENTANYL CITRATE 25 MCG: 50 INJECTION INTRAMUSCULAR; INTRAVENOUS at 17:19

## 2022-04-12 RX ADMIN — SODIUM CHLORIDE: 0.9 INJECTION, SOLUTION INTRAVENOUS at 16:18

## 2022-04-12 RX ADMIN — INSULIN GLARGINE 10 UNITS: 100 INJECTION, SOLUTION SUBCUTANEOUS at 21:33

## 2022-04-12 RX ADMIN — Medication 1000 MG: at 09:30

## 2022-04-12 RX ADMIN — PROPOFOL 140 MG: 10 INJECTION, EMULSION INTRAVENOUS at 16:29

## 2022-04-12 RX ADMIN — OXYCODONE HYDROCHLORIDE 10 MG: 10 TABLET ORAL at 18:27

## 2022-04-12 RX ADMIN — FENTANYL CITRATE 25 MCG: 50 INJECTION INTRAMUSCULAR; INTRAVENOUS at 17:14

## 2022-04-12 RX ADMIN — ALPRAZOLAM 0.25 MG: 0.25 TABLET ORAL at 01:18

## 2022-04-12 RX ADMIN — LIDOCAINE HYDROCHLORIDE 50 MG: 20 INJECTION, SOLUTION EPIDURAL; INFILTRATION; INTRACAUDAL; PERINEURAL at 16:29

## 2022-04-12 RX ADMIN — EZETIMIBE 10 MG: 10 TABLET ORAL at 05:11

## 2022-04-12 RX ADMIN — GABAPENTIN 300 MG: 300 CAPSULE ORAL at 21:33

## 2022-04-12 NOTE — OP NOTE
OPERATIVE REPORT  PATIENT NAME: Valerie Simpson    :  1945  MRN: [id number]  Pt Location: [facility]    SURGERY DATE: 2022    Surgeon(s) and Role:     * Miguel Pearson MD - Primary    Preop Diagnosis:  Gross hematuria [R31 0]  Bladder cancer    Post-Op Diagnosis Codes:     * Gross hematuria [R31 0]  Bladder cancer    Procedure(s) (LRB):  CYSTOSCOPY (N/A)    Specimen(s):  ID Type Source Tests Collected by Time Destination   1 : selected bladder biopsies Tissue Urinary Bladder TISSUE EXAM Miguel Pearson MD 2022  4:46 PM    2 : prostate urethra Tissue Other TISSUE EXAM Miguel Pearson MD 2022  4:46 PM        Estimated Blood Loss:   Minimal    Drains:  Nephrostomy Right 1 10 2 Fr  (Active)   Site Assessment Clean; Intact 22   Dressing Status Clean;Dry; Intact 22   Dressing Type Split gauze 22   Collection Container Leg bag 22   Securement Method Securing device (Describe) 22 1101   Intake (mL) 10 mL 22 1000   Output (mL) 100 mL 22 1000   Number of days: 46       Nephrostomy Left 2 10 2 Fr  (Active)   Site Assessment Clean; Intact 22   Dressing Status Clean;Dry; Intact 22   Dressing Type Split gauze 22   Collection Container Leg bag 22   Securement Method Securing device (Describe) 22 1101   Intake (mL) 10 mL 22 1000   Output (mL) 150 mL 22 1000   Number of days: 46       Anesthesia Type:   General    Operative Indications:  Gross hematuria [R31 0]  Bladder cancer  Pelvic and penile pain    Operative Findings:  Inflamed bladder with necrotic epithelium, suspicion of tumor on posterior anterior wall  Possible tumor in prostatic urethra    No other etiology determined for chronic pelvic and penile pain    Complications:   None    Procedure and Technique:  After the patient was placed under adequate general anesthesia, he was prepped and draped using chlorhexidine solution in lithotomy position  The 25 Latvian scope was passed without difficulty in the urethra which had no stenosis  The prostatic urethra had some ecchymosis, sloughing epithelium, and possible neoplasm on the left lateral lobe epithelium  The bladder was entered, severely inflamed and similar appearance as the prostate with sloughing epithelium areas of boulus edema  Suspicion of tumor on the posterior wall and anterior wall  Biopsies were taken of these two sites  Biopsy taken of prostatic urethra result  Minimal bleeding was encountered, electrocautery used for control of this minimal bleeding    Thorough inspection afterwards showed no significant bleeding  Shirley catheter was not needed  Patient taken to recovery good condition     I was present for the entire procedure    Patient Disposition:  PACU       SIGNATURE: Reginald Mock MD  DATE: April 12, 2022  TIME: 4:50 PM

## 2022-04-12 NOTE — ASSESSMENT & PLAN NOTE
· Urine culture with citrobacter and serratia with multiple sensitivities    · Continue ceftriaxone through procedure

## 2022-04-12 NOTE — ANESTHESIA PREPROCEDURE EVALUATION
Procedure:  CYSTOSCOPY (N/A Bladder)    Relevant Problems   CARDIO   (+) Coronary artery disease of native artery of native heart with stable angina pectoris (HCC)   (+) Hyperlipidemia   (+) Hypertension with renal disease   (+) Stable angina pectoris (HCC)      ENDO   (+) Secondary renal hyperparathyroidism (HCC)   (+) Type 2 diabetes mellitus, with long-term current use of insulin (HCC)      /RENAL   (+) Acute kidney injury superimposed on CKD (HCC)   (+) Acute renal failure (ARF) ([facility])   (+) Bilateral hydronephrosis   (+) CKD (chronic kidney disease) stage 4, GFR 15-29 ml/min ([facility])      HEMATOLOGY   (+) Anemia of chronic disease   (+) Chronic anemia   (+) Thrombocytopenia (HCC)      MUSCULOSKELETAL   (+) Back pain   (+) Degeneration of lumbar intervertebral disc   (+) Lumbar spondylosis   (+) Primary osteoarthritis of left knee      NEURO/PSYCH   (+) Chronic pain syndrome   (+) Continuous opioid dependence ([facility])   (+) Diabetic polyneuropathy associated with type 2 diabetes mellitus ([facility])   (+) Moderate major depression, single episode ([facility])   (+) Stable angina pectoris (HCC)      PULMONARY   (+) Chronic bronchitis (HCC)      Other   (+) Acute cystitis with hematuria   (+) Malignant neoplasm of anterior wall of urinary bladder ([facility])        Physical Exam    Airway    Mallampati score: III  TM Distance: >3 FB  Neck ROM: full     Dental       Cardiovascular  Rhythm: irregular, Rate: normal,     Pulmonary  Breath sounds clear to auscultation, Decreased breath sounds,     Other Findings        Anesthesia Plan  ASA Score- 3 Emergent    Anesthesia Type- general with ASA Monitors  Additional Monitors:   Airway Plan:           Plan Factors-Exercise tolerance (METS): <4 METS  Chart reviewed  Existing labs reviewed  Patient summary reviewed  Patient is not a current smoker  Patient not instructed to abstain from smoking on day of procedure  Patient did not smoke on day of surgery      Obstructive sleep apnea risk education given perioperatively  Induction- intravenous  Postoperative Plan-     Informed Consent- Anesthetic plan and risks discussed with patient

## 2022-04-12 NOTE — PROGRESS NOTES
Progress Note - Palliative & Supportive Care  Elizabeth Ramírez  76 y o   male  Jonathan 2 /E2 MS 12-*   MRN: [id number]  Encounter: [id number]     ASSESSMENT:    Patient Active Problem List   Diagnosis    Coronary artery disease of native artery of native heart with stable angina pectoris ([facility] [address] [facility]    Bladder carcinoma ([facility] [address] [facility]    Hypertension with renal disease    Hyperlipidemia    Presence of stent in coronary artery    Type 2 diabetes mellitus, with long-term current use of insulin ([facility] [address] )    Primary osteoarthritis of left knee    Cystitis due to intravesical BCG administration    Degeneration of lumbar intervertebral disc    Back pain    Arteriosclerosis of artery of extremity ([facility] [address] [facility]    Stable angina pectoris (HCC)    Herpes zoster without complication    Localized edema    Superficial phlebitis    Preop examination    Acute cystitis with hematuria    Acute renal failure (ARF) ([facility] [address] [facility]    Secondary renal hyperparathyroidism ([facility] [address] [facility]    Malignant neoplasm of anterior wall of urinary bladder ([facility])    Abnormal MRI, lumbar spine    Diabetic polyneuropathy associated with type 2 diabetes mellitus ([facility] [address] [facility]    Dysuria    Diabetic ulcer of left foot associated with type 2 diabetes mellitus, 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 ([facility])    Acute kidney injury superimposed on CKD (HCC)    Chronic anemia    Anemia of chronic disease    Preoperative clearance    PAD (peripheral artery disease) ([facility])    Left carotid bruit    Gross hematuria    Chronic bronchitis (HCC)    Moderate major depression, single episode (HCC)    Thrombocytopenia (HCC)    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    Lumbar spondylosis    Chronic pain syndrome    Acute urinary retention    Hematuria    Continuous opioid dependence ([address]    Port-A-Cath in place    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[facility] [address] [facility]    Embolism and 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 ([facility])    Abnormal CT scan, bladder    Retained ureteral stent    Fall    Hyperkalemia    CKD (chronic kidney disease) stage 4, GFR 15-29 ml/min ([facility])    Bilateral hydronephrosis       Active problems addressed:  Muscle invasive bladder cancer  Acute on chronic cancer related pain  Dysuria  Hematuria: improving  Goals of care  Drug induced constipation: has not moved bowels since Codey 4/10      PLAN:    1  Symptom management:   Oxycodone IR 10mg q6 prn- severe   Oxycodone IR 5mg q6 prn- moderate   Dilaudid 0 5mg breakthrough    Oxybutynin ER 10mg daily   Tylenol 650mg PO q6 prn- mild   Add Colace oral tablet scheduled BID, make Miralax scheduled, and increase Senna to 2 tablets daily    2  Goals:    Treatment directed therapy  Code status: Level 1 - Full Code   Decisional apparatus:  Patient does have capacity to make medical decisions on my exam today  If such capacity is lost, patient's substitute decision maker would default to wife [place]  Advance Directive / Living Will / POLST:  none    3  Cystoscopy (diagnostic) to be performed today by Urology    We appreciate the opportunity to participate in this patient's care  We will continue to follow  Please do not hesitate to contact our on-call provider through our clinic answering service at [phone] should you have acute symptom control concerns  INTERVAL HISTORY:  Chart reviewed  No acute events overnight  MAR reviewed  Patient reports 7/10 penile and rectal pain at the time of my assessment  Last pain medication administered at 0900  Patient is NPO today for cystoscopy  Review of Systems   Gastrointestinal: Positive for constipation and rectal pain  Negative for diarrhea, nausea and vomiting  Genitourinary: Positive for dysuria, hematuria and penile pain  Negative for difficulty urinating  Psychiatric/Behavioral: Positive for sleep disturbance         MEDICATIONS / ALLERGIES:  all current active meds have been reviewed and current meds:   Current Facility-Administered Medications   Medication Dose Route Frequency    acetaminophen (TYLENOL) tablet 650 mg  650 mg Oral Q6H PRN    ALPRAZolam (XANAX) tablet 0 25 mg  0 25 mg Oral TID PRN    belladonna-opium (B&O SUPPOSITORY) 16 2-30 mg suppository 1 suppository  30 mg Rectal Q8H PRN    bimatoprost (LUMIGAN) 0 01 % ophthalmic solution 1 drop  1 drop Both Eyes HS    calcitriol (ROCALTROL) capsule 0 25 mcg  0 25 mcg Oral Daily    ceftriaxone (ROCEPHIN) 1 g/50 mL in dextrose IVPB  1,000 mg Intravenous Q24H    DULoxetine (CYMBALTA) delayed release capsule 20 mg  20 mg Oral Daily    ezetimibe (ZETIA) tablet 10 mg  10 mg Oral Early Morning    ferrous sulfate tablet 325 mg  325 mg Oral Daily With Breakfast    fluticasone (FLONASE) 50 mcg/act nasal spray 1 spray  1 spray Nasal Daily PRN    gabapentin (NEURONTIN) capsule 300 mg  300 mg Oral HS    HYDROmorphone (DILAUDID) injection 0 5 mg  0 5 mg Intravenous Q4H PRN    insulin lispro (HumaLOG) 100 units/mL subcutaneous injection 1-6 Units  1-6 Units Subcutaneous 4x Daily (AC & HS)    insulin lispro (HumaLOG) 100 units/mL subcutaneous injection 5 Units  5 Units Subcutaneous TID With Meals    isosorbide mononitrate (IMDUR) 24 hr tablet 30 mg  30 mg Oral Early Morning    metoprolol succinate (TOPROL-XL) 24 hr tablet 100 mg  100 mg Oral Daily    multivitamin stress formula tablet 1 tablet  1 tablet Oral Daily    oxybutynin (DITROPAN-XL) 24 hr tablet 10 mg  10 mg Oral Daily    oxyCODONE (ROXICODONE) immediate release tablet 10 mg  10 mg Oral Q6H PRN    oxyCODONE (ROXICODONE) IR tablet 5 mg  5 mg Oral Q6H PRN    pantoprazole (PROTONIX) EC tablet 20 mg  20 mg Oral Early Morning    polyethylene glycol (MIRALAX) packet 17 g  17 g Oral Daily PRN    senna (SENOKOT) tablet 8 6 mg  1 tablet Oral Daily    sertraline (ZOLOFT) tablet 50 mg  50 mg Oral Daily    tamsulosin (FLOMAX) capsule 0 4 mg  0 4 mg Oral HS       Allergies   Allergen Reactions    Atorvastatin Hives, Itching and Rash    Simvastatin Rash and Edema     Edema of lower legs    Statins Hives and Itching    Insulin Lispro Swelling and Edema     " Lower Legs"    Other Itching, Rash and Other (See Comments)     "EKG Patches"   "blue EKG patches"       OBJECTIVE:  /62 (BP Location: Right arm)   Pulse 66   Temp (!) 97 2 °F (36 2 °C) (Tympanic)   Resp 18   Ht 6' (1 829 m)   Wt 103 kg (226 lb 3 1 oz)   SpO2 99%   BMI 30 68 kg/m²   Physical Exam:    Physical Exam  Constitutional:       General: He is not in acute distress  Appearance: He is not ill-appearing  HENT:      Head: Normocephalic and atraumatic  Mouth/Throat:      Mouth: Mucous membranes are moist       Pharynx: Oropharynx is clear  Eyes:      Extraocular Movements: Extraocular movements intact  Conjunctiva/sclera: Conjunctivae normal    Pulmonary:      Effort: Pulmonary effort is normal    Abdominal:      General: Abdomen is flat  There is no distension  Genitourinary:     Comments: Hematuria visualized in urinal, wagner removed  Musculoskeletal:         General: Normal range of motion  Cervical back: Neck supple  Skin:     General: Skin is warm and dry  Neurological:      General: No focal deficit present  Mental Status: He is alert and oriented to person, place, and time  Lab Results:   I have personally reviewed pertinent labs  Imaging Studies: I have personally reviewed pertinent reports  EKG, Pathology, and Other Studies: I have personally reviewed pertinent reports  Counseling / Coordination of Care  Total floor / unit time spent today 30 minutes  Greater than 50% of total time was spent with the patient and / or family counseling and / or coordination of care   A description of the counseling / coordination of care: provided medical updates, discussed palliative care, determined competency, determined goals of care, determined POA, determined social/family support, discussed plans of care, discussed symptom management, provided psychosocial support      Zayra Ramírez, [address]

## 2022-04-12 NOTE — INTERVAL H&P NOTE
H&P reviewed  After examining the patient I find no changes in the patients condition since the H&P had been written      Vitals:    04/12/22 0730   BP: 108/62   Pulse: 66   Resp: 18   Temp:    SpO2: 99%

## 2022-04-12 NOTE — PLAN OF CARE
Problem: Potential for Falls  Goal: Patient will remain free of falls  Description: INTERVENTIONS:  - Educate patient/family on patient safety including physical limitations  - Instruct patient to call for assistance with activity   - Consult OT/PT to assist with strengthening/mobility   - Keep Call bell within reach  - Keep bed low and locked with side rails adjusted as appropriate  - Keep care items and personal belongings within reach  - Initiate and maintain comfort rounds  - Make Fall Risk Sign visible to staff  - Offer Toileting every 2 Hours, in advance of need  - Initiate/Maintain bed alarm  - Obtain necessary fall risk management equipment: walker or cane, call bell, gripper socks, increased rounds  - Apply yellow socks and bracelet for high fall risk patients  - Consider moving patient to room near nurses station  Outcome: Progressing     Problem: MOBILITY - ADULT  Goal: Maintain or return to baseline ADL function  Description: INTERVENTIONS:  -  Assess patient's ability to carry out ADLs; assess patient's baseline for ADL function and identify physical deficits which impact ability to perform ADLs (bathing, care of mouth/teeth, toileting, grooming, dressing, etc )  - Assess/evaluate cause of self-care deficits   - Assess range of motion  - Assess patient's mobility; develop plan if impaired  - Assess patient's need for assistive devices and provide as appropriate  - Encourage maximum independence but intervene and supervise when necessary  - Involve family in performance of ADLs  - Assess for home care needs following discharge   - Consider OT consult to assist with ADL evaluation and planning for discharge  - Provide patient education as appropriate  Outcome: Progressing     Problem: GENITOURINARY - ADULT  Goal: Maintains or returns to baseline urinary function  Description: INTERVENTIONS:  - Assess urinary function  - Encourage oral fluids to ensure adequate hydration if ordered  - Administer IV fluids as ordered to ensure adequate hydration  - Administer ordered medications as needed  - Offer frequent toileting  - Follow urinary retention protocol if ordered  Outcome: Progressing  Goal: Absence of urinary retention  Description: INTERVENTIONS:  - Assess patient's ability to void and empty bladder  - Monitor I/O  - Bladder scan as needed  - Discuss with physician/AP medications to alleviate retention as needed  - Discuss catheterization for long term situations as appropriate  Outcome: Progressing  Goal: Urinary catheter remains patent  Description: INTERVENTIONS:  - Assess patency of urinary catheter  - If patient has a chronic wagner, consider changing catheter if non-functioning  - Follow guidelines for intermittent irrigation of non-functioning urinary catheter  Outcome: Progressing     Problem: HEMATOLOGIC - ADULT  Goal: Maintains hematologic stability  Description: INTERVENTIONS  - Assess for signs and symptoms of bleeding or hemorrhage  - Monitor labs  - Administer supportive blood products/factors as ordered and appropriate  Outcome: Progressing     Problem: PAIN - ADULT  Goal: Verbalizes/displays adequate comfort level or baseline comfort level  Description: Interventions:  - Encourage patient to monitor pain and request assistance  - Assess pain using appropriate pain scale  - Administer analgesics based on type and severity of pain and evaluate response  - Implement non-pharmacological measures as appropriate and evaluate response  - Consider cultural and social influences on pain and pain management  - Notify physician/advanced practitioner if interventions unsuccessful or patient reports new pain  Outcome: Progressing

## 2022-04-12 NOTE — PROGRESS NOTES
[facility]  Progress Note - Aaron Soria 1945, 76 y o  male MRN: [id number]  Unit/Bed#: E2 -01 Encounter: [id number]  Primary Care Provider: Seth Basilio MD   Date and time admitted to hospital: 4/6/2022 10:30 PM    * Gross hematuria  Assessment & Plan  75-year-old gentleman with CAD, CKD, diabetes, and bladder cancer who presents with hematuria in setting of cystitis and penile pain  · Bladder cancer status post TURBT and BCG, bilateral nephrostomy tubes  · Following with Deonte Drop, not a radical cystectomy candidate due to comorbidities  · Continues to have hematuria  · Plan for cystoscopy 4/12/2022, NPO   · Appreciate palliative care recommendations for pain control    CKD (chronic kidney disease) stage 4, GFR 15-29 ml/min ([facility] [address] )  Assessment & Plan  · Currently at baseline    Malignant neoplasm of anterior wall of urinary bladder ([facility] [address] )  Assessment & Plan  · Known bladder carcinoma followed outpatient by Urology and Oncology  · Evaluated by inpatient Oncology  · Outpatient follow-up with primary oncologist    Acute cystitis with hematuria  Assessment & Plan  · Urine culture with citrobacter and serratia with multiple sensitivities  · Continue ceftriaxone through procedure    Type 2 diabetes mellitus, with long-term current use of insulin [facility])  Assessment & Plan  Lab Results   Component Value Date    HGBA1C 7 7 (H) 09/03/2021     Recent Labs     04/11/22  1634 04/11/22  2102 04/12/22  0624 04/12/22  1358   POCGLU 157* 214* 184* 167*     · Continue 10 units of Lantus at bedtime, 5 units of Humalog t i d   With meals    Hypertension with renal disease  Assessment & Plan  · Continue metoprolol       VTE Pharmacologic Prophylaxis:  On hold in setting of hematuria    Patient Centered Rounds:  Patient care rounds were performed with nursing    Education and Discussions with Family / Patient:  Patient    Time Spent for Care: 30  More than 50% of total time spent on counseling and coordination of care as described above  Current Length of Stay: 5 day(s)    Current Patient Status: Inpatient   Certification Statement: The patient will continue to require additional inpatient hospital stay due to need for inpatient procedure    Discharge Plan:  Pending urologic workup    Code Status: Level 1 - Full Code      Subjective:   Patient seen and evaluated at bedside  Continues to have penile pain  Continues to have hematuria  Objective:     Vitals:   Temp (24hrs), Av 7 °F (35 9 °C), Min:96 1 °F (35 6 °C), Max:97 2 °F (36 2 °C)    Temp:  [96 1 °F (35 6 °C)-97 2 °F (36 2 °C)] 97 2 °F (36 2 °C)  HR:  [65-68] 66  Resp:  [18] 18  BP: (101-110)/(58-64) 108/62  SpO2:  [99 %] 99 %  Body mass index is 30 68 kg/m²  Input and Output Summary (last 24 hours): Intake/Output Summary (Last 24 hours) at 2022 1439  Last data filed at 2022 1000  Gross per 24 hour   Intake 20 ml   Output 1725 ml   Net -1705 ml       Physical Exam:     Physical Exam  Vitals reviewed  Constitutional:       General: He is not in acute distress  Appearance: He is well-developed  He is not ill-appearing, toxic-appearing or diaphoretic  HENT:      Head: Normocephalic and atraumatic  Mouth/Throat:      Mouth: Mucous membranes are moist       Pharynx: No oropharyngeal exudate  Eyes:      General: No scleral icterus  Extraocular Movements: Extraocular movements intact  Conjunctiva/sclera: Conjunctivae normal    Cardiovascular:      Rate and Rhythm: Normal rate and regular rhythm  Heart sounds: Normal heart sounds  Pulmonary:      Effort: Pulmonary effort is normal  No respiratory distress  Breath sounds: Normal breath sounds  No wheezing or rales  Abdominal:      General: There is no distension  Palpations: Abdomen is soft  Tenderness: There is no abdominal tenderness  There is no guarding or rebound     Musculoskeletal:         General: No swelling, tenderness or deformity  Skin:     General: Skin is warm and dry  Neurological:      General: No focal deficit present  Mental Status: He is alert  Mental status is at baseline  Psychiatric:         Mood and Affect: Mood normal          Behavior: Behavior normal          Thought Content: Thought content normal          Judgment: Judgment normal          Additional Data:     Labs: I have reviewed pertinent results     Results from last 7 days   Lab Units 04/12/22  0509 04/10/22  0634 04/09/22  0958   WBC Thousand/uL 5 51   < > 8 48   HEMOGLOBIN g/dL 8 8*   < > 9 0*   HEMATOCRIT % 26 3*   < > 27 3*   PLATELETS Thousands/uL 187   < > 155   NEUTROS PCT %  --   --  73   LYMPHS PCT %  --   --  16   MONOS PCT %  --   --  9   EOS PCT %  --   --  1    < > = values in this interval not displayed  Results from last 7 days   Lab Units 04/12/22  0509 04/11/22  0550 04/11/22  0550   SODIUM mmol/L 137   < > 138   POTASSIUM mmol/L 4 4   < > 4 2   CHLORIDE mmol/L 104   < > 105   CO2 mmol/L 23   < > 24   BUN mg/dL 52*   < > 48*   CREATININE mg/dL 2 93*   < > 2 71*   ANION GAP mmol/L 10   < > 9   CALCIUM mg/dL 8 7   < > 8 6   ALBUMIN g/dL  --   --  2 6*   TOTAL BILIRUBIN mg/dL  --   --  0 24   ALK PHOS U/L  --   --  112   ALT U/L  --   --  34   AST U/L  --   --  22   GLUCOSE RANDOM mg/dL 184*   < > 101    < > = values in this interval not displayed       Results from last 7 days   Lab Units 04/06/22  2336   INR  1 07     Results from last 7 days   Lab Units 04/12/22  1358 04/12/22  0624 04/11/22  2102 04/11/22  1634 04/11/22  1126 04/11/22  0611 04/10/22  2034 04/10/22  1536 04/10/22  1117 04/10/22  0635 04/09/22  2110 04/09/22  1543   POC GLUCOSE mg/dl 167* 184* 214* 157* 145* 105 169* 259* 199* 141* 181* 175*                 Imaging: I have reviewed pertinent imaging       Recent Cultures (last 7 days):     Results from last 7 days   Lab Units 04/07/22  0057   URINE CULTURE  40,000-49,000 cfu/ml Serratia marcescens*  10,000-19,000 cfu/ml Citrobacter freundii*       Last 24 Hours Medication List:   Current Facility-Administered Medications   Medication Dose Route Frequency Provider Last Rate    acetaminophen  650 mg Oral Q6H PRN Waunita Fleischer, CRNP      ALPRAZolam  0 25 mg Oral TID PRN Waunita Fleischer, CRNP      belladonna-opium  30 mg Rectal Q8H PRN Xiomara Parry PA-C      bimatoprost  1 drop Both Eyes HS Waunita Fleischer, CRNP      calcitriol  0 25 mcg Oral Daily Waunita Fleischer, CRNP      cefTRIAXone  1,000 mg Intravenous Q24H Melissa Dailey MD 1,000 mg (04/12/22 0930)    docusate sodium  100 mg Oral BID Lacho Angel DO      DULoxetine  20 mg Oral Daily Waunita Fleischer, CRNP      ezetimibe  10 mg Oral Early Morning Waunita Fleischer, CRNP      ferrous sulfate  325 mg Oral Daily With Breakfast Waunita Fleischer, CRNP      fluticasone  1 spray Nasal Daily PRN Waunita Fleischer, CRNP      gabapentin  300 mg Oral HS Fabiano Villanueva,       HYDROmorphone  0 5 mg Intravenous Q4H PRN Cherelle Dance, DO      insulin glargine  10 Units Subcutaneous HS Marlis Dance, DO      insulin lispro  1-6 Units Subcutaneous 4x Daily (AC & HS) Fabiano Villanueva, DO      insulin lispro  5 Units Subcutaneous TID With Meals Waunita Fleischer, CRNP      isosorbide mononitrate  30 mg Oral Early Morning Waunita Fleischer, CRNP      metoprolol succinate  100 mg Oral Daily Waunita Fleischer, CRNP      multivitamin stress formula  1 tablet Oral Daily Waunita Fleischer, CRNP      oxybutynin  10 mg Oral Daily Waunita Fleischer, CRNP      oxyCODONE  10 mg Oral Q6H PRN Cherelle Danjohnny,       oxyCODONE  5 mg Oral Q6H PRN Waunita Fleischer, CRNP      pantoprazole  20 mg Oral Early Morning Waunita Fleischer, CRNP      [START ON 4/13/2022] polyethylene glycol  17 g Oral Daily Lacho Angel, DO      [START ON 4/13/2022] senna  2 tablet Oral Daily Lacho Angel DO      sertraline  50 mg Oral Daily EVANGELINA Lindo      tamsulosin  0 4 mg Oral HS EVANGELINA Lindo          Today, Patient Was Seen By: Mari Chua DO    ** Please Note: Dictation voice to text software may have been used in the creation of this document   **

## 2022-04-12 NOTE — ASSESSMENT & PLAN NOTE
Lab Results   Component Value Date    HGBA1C 7 7 (H) 09/03/2021     Recent Labs     04/11/22  1634 04/11/22  2102 04/12/22  0624 04/12/22  1358   POCGLU 157* 214* 184* 167*     · Continue 10 units of Lantus at bedtime, 5 units of Humalog t i d   With meals

## 2022-04-12 NOTE — PROGRESS NOTES
Progress Note - Urology  Joshuaed Woodard 1945, 76 y o  male MRN: [id number]    Unit/Bed#: E2 -01 Encounter: [id number]    Assessment/Plan:  Recurrent (1994 to current) BCG refractory bladder cancer, most recent pathology progressed to muscle invasive disease as of October 2021  Bilateral ureteral obstruction managed with bilateral nephrostomy tubes since February 2022 and doing well      Second opinion Blanche Negron- not radical cystectomy candidate due to renal cardiac and bariatric comorbidities  Followed by urology there now and due to see them back end of this month  Has opted for concurrent chemoradiation as treatment  Not yet completed, in progress here at [facility])       Last cystoscopy 5 weeks ago for purpose of stent removal as that was felt to possibly be contributing to penile pain during radiation, no significant tumor burden/findings aside from particulate urine and diffuse irritation  Plan was to repeat office cystoscopy 6 weeks after completion of XRT       Receiving antibiotics for serratia bacteriuria/uti at present  Intermittent penile pain and hematuria for several months despite treatment modalities  Oral analgesia and b&o suppository for analgesia is well tolerated  Shirley catheter removed yesterday, only passing small amounts of clear red urine per his urethra  Bilateral PCNs patent with clear yellow urine  Due to ongoing hematuria and readmissions, a diagnostic cystoscopy will be performed today  Goal diagnostic not therapeutic, but will depend on intraoperative findings       NPO since midnight for OR today with Dr Julieth Waldrop  Subjective:   HPI: Clear red urine in bedside urinal, bilateral PCNs patent with clear yellow urine  Penile/pelvic pain unchanged  He denies any fevers, chills, CP, SOB, N/V/D this morning  Review of Systems   Constitutional: Negative for chills and fever  HENT: Negative for congestion and sore throat  Respiratory: Negative for cough and shortness of breath  Cardiovascular: Negative for chest pain and palpitations  Gastrointestinal: Negative for abdominal pain, diarrhea, nausea and vomiting  Genitourinary: Positive for hematuria and penile pain (and pelvic pain)  Negative for dysuria and flank pain  Musculoskeletal: Negative  Skin: Negative  Neurological: Negative for dizziness and light-headedness  Objective:  Nursing Rounds: Plan communicated with Gabriel Gu RN  Vitals: Blood pressure 108/62, pulse 66, temperature (!) 97 2 °F (36 2 °C), temperature source Tympanic, resp  rate 18, height 6' (1 829 m), weight 103 kg (226 lb 3 1 oz), SpO2 99 %  ,Body mass index is 30 68 kg/m²  Physical Exam  Vitals reviewed  Constitutional:       General: He is not in acute distress  Appearance: Normal appearance  He is obese  He is not ill-appearing, toxic-appearing or diaphoretic  HENT:      Head: Normocephalic and atraumatic  Eyes:      Conjunctiva/sclera: Conjunctivae normal    Cardiovascular:      Rate and Rhythm: Normal rate and regular rhythm  Heart sounds: Normal heart sounds  Pulmonary:      Effort: Pulmonary effort is normal  No respiratory distress  Breath sounds: Normal breath sounds  Abdominal:      General: There is no distension  Palpations: Abdomen is soft  Tenderness: There is no abdominal tenderness  There is no right CVA tenderness, left CVA tenderness, guarding or rebound  Genitourinary:     Comments: Bilateral PCNs patent draining clear yellow urine  Musculoskeletal:         General: Normal range of motion  Cervical back: Normal range of motion  Skin:     General: Skin is warm and dry  Neurological:      General: No focal deficit present  Mental Status: He is alert and oriented to person, place, and time  Psychiatric:         Mood and Affect: Mood normal          Behavior: Behavior normal          Thought Content:  Thought content normal          Judgment: Judgment normal  Imaging:  No new imaging  Labs:  Recent Labs     04/10/22  0634 04/11/22  0550 04/12/22  0509   WBC 6 15 6 03 5 51     Recent Labs     04/10/22  0634 04/11/22  0550 04/12/22  0509   HGB 8 6* 8 6* 8 8*     Recent Labs     04/10/22  0634 04/11/22  0550 04/12/22  0509   HCT 25 2* 24 5* 26 3*     Recent Labs     04/10/22  0634 04/11/22  0550 04/12/22  0509   CREATININE 2 96* 2 71* 2 93*     Urine Culture 40,000-49,000 cfu/ml Serratia marcescens Abnormal        10,000-19,000 cfu/ml Citrobacter freundii Abnormal               Susceptibility     Serratia marcescens Citrobacter freundii     ERIC ERIC     Amoxicillin + Clavulanate 16/8 ug/ml Resistant >16/8 ug/ml Resistant     Ampicillin ($$) <=8 00 ug/ml Resistant 16 00 ug/ml Resistant     Ampicillin + Sulbactam ($) <=4/2 ug/ml Resistant 16/8 ug/ml Resistant     Aztreonam ($$$)  <=4 ug/ml Susceptible <=4 ug/ml Susceptible     Cefazolin ($) >16 00 ug/ml Resistant >16 00 ug/ml Resistant     Ceftazidime ($$) <=1 ug/ml Susceptible <=1 ug/ml Susceptible     Ceftriaxone ($$) <=1 00 ug/ml Susceptible <=1 00 ug/ml Susceptible     Cefuroxime ($$) <=4 ug/ml Resistant >16 ug/ml Resistant     Ciprofloxacin ($)  <=0 25 ug/ml Susceptible <=0 25 ug/ml Susceptible     Ertapenem ($$$) <=0 5 ug/ml Susceptible <=0 5 ug/ml Susceptible     Gentamicin ($$) <=2 ug/ml Susceptible <=2 ug/ml Susceptible     Levofloxacin ($) <=0 50 ug/ml Susceptible <=0 50 ug/ml Susceptible     Minocycline   <=4 ug/ml Susceptible     Nitrofurantoin <=32 ug/ml Resistant >64 ug/ml Resistant     Piperacillin + Tazobactam ($$$) <=8 ug/ml Susceptible <=8 ug/ml Susceptible     Tetracycline <=4 ug/ml Susceptible >8 ug/ml Resistant     Tobramycin ($) <=2 ug/ml Susceptible <=2 ug/ml Susceptible     Trimethoprim + Sulfamethoxazole ($$$) <=0 5/9 5 u  Susceptible <=0 5/9 5 u   Susceptible     ZID Performed Yes   Yes                      Specimen Collected: 04/07/22 00:57 Last Resulted: 04/10/22 10:08 History:  Past Medical History:   Diagnosis Date    Anemia     Last assessed: 17    Anxiety     Arteriosclerotic cardiovascular disease     Last assessed: 17    Arthritis     Bladder cancer ([facility] [address] )     bladder- had BCG treatments    Chronic kidney disease     Stage IV    CKD (chronic kidney disease) stage 4, GFR 15-29 ml/min ([facility])     Colon polyp     Coronary artery disease     7 stents    Depression     Diabetes mellitus ([facility])     IDDM    GERD (gastroesophageal reflux disease)     Glaucoma     Hematuria     History of fusion of cervical spine     Hyperlipidemia     Hypertension     Insomnia     Last assessed: 12    Loss of hearing     has hearing aids but usually does not wear them    Other seasonal allergic rhinitis     Last assessed: 2/10/16    PAD (peripheral artery disease) (formerly [facility])     Shortness of breath     on exertion    Spinal stenosis of lumbar region     Transient cerebral ischemia     No Residual    Uses walker     w/c for longer distances     Social History     Socioeconomic History    Marital status: /Civil Union     Spouse name: None    Number of children: None    Years of education: None    Highest education level: None   Occupational History    None   Tobacco Use    Smoking status: Former Smoker     Packs/day: 3 00     Years: 27 00     Pack years: 81 00     Types: Cigarettes     Quit date: 1970     Years since quittin 3    Smokeless tobacco: Never Used   Vaping Use    Vaping Use: Never used   Substance and Sexual Activity    Alcohol use: Never     Comment: beer / liquor    Drug use: Not Currently     Types: Marijuana     Comment: quit 2019 had medical marijuana    Sexual activity: Not Currently   Other Topics Concern    None   Social History Narrative    Consumes 1 cup of coffee and 1 soda per day     Social Determinants of Health     Financial Resource Strain: Not on file   Food Insecurity: Not on file   Transportation Needs: Not on file   Physical Activity: Not on file   Stress: Not on file   Social Connections: Not on file   Intimate Partner Violence: Not on file   Housing Stability: Not on file     Past Surgical History:   Procedure Laterality Date   [address] 41      Cath stent placement  Last assessed: 3/9/17  Interventional Catheterization    CHOLECYSTECTOMY      COLONOSCOPY      CYSTOSCOPY      Diagnostic w/biopsy  Rosalba Dirk  Last assessed: 12/1/14    CYSTOSCOPY W/ RETROGRADES Right 3/1/2022    Procedure: CYSTO; stent removal retrograde;  Surgeon: Rolly Melgar MD;  [facility];  Service: Urology    CYSTOSCOPY W/ URETERAL STENT PLACEMENT Bilateral 10/18/2021    Procedure: bilateral retrogrades, cytology collection;  Surgeon: Rolly Melgar MD;  [facility];  Service: Urology    CYSTOURETHROSCOPY      w/cautery  Rosalba Dirk    FL RETROGRADE PYELOGRAM  10/18/2021    FL RETROGRADE PYELOGRAM  10/24/2021    GASTRIC BYPASS      For morbid obesity w/Shaji-en-Y   Resolved: 11/17/09    INCISION AND DRAINAGE OF WOUND Right 2/26/2017    Procedure: INCISION AND DRAINAGE (I&D) EXTREMITY WITH APPLICATION OF GRAFT JACKET;  Surgeon: Kai Freedman DPM;  [facility];  Service:     INCISION AND DRAINAGE OF WOUND Right 4/25/2017    Procedure: INCISION AND DRAINAGE (I&D) EXTREMITY, APPLICATION OF GRAFT;  Surgeon: Kai Freedman DPM;  [facility];  Service:     IR BIOPSY OTHER  7/2/2020    IR LOWER EXTREMITY ANGIOGRAM  2/8/2021    IR LOWER EXTREMITY ANGIOGRAM  2/11/2021    IR NEPHROSTOMY TUBE PLACEMENT  2/25/2022    IR PORT PLACEMENT  1/17/2022    IR TUNNELED CENTRAL LINE PLACEMENT  12/24/2020    JOINT REPLACEMENT      christofer knees replaced    IA AMPUTATION METATARSAL+TOE,SINGLE Left 12/21/2020    Procedure: RAY RESECTION FOOT;  Surgeon: Marisela Gonzalez DPM;  [facility];  Service: Podiatry    IA AMPUTATION METATARSAL+TOE,SINGLE Left 12/31/2020    Procedure: 5TH MET RESECTION;  Surgeon: Radha Rivers DPM;  [facility];  Service: Podiatry    VT CYSTOURETHROSCOPY W/IRRIG & EVAC CLOTS N/A 2/10/2021    Procedure: CYSTOSCOPY EVACUATION OF CLOTS, fulguration;  Surgeon: Merlin Mera MD;  [facility];  Service: Urology    VT CYSTOURETHROSCOPY W/IRRIG & EVAC CLOTS N/A 10/24/2021    Procedure: CYSTOSCOPY EVACUATION OF CLOT, fulguration of bleeding vessels, right ureter stent placement, retrograde pyelogram;  Surgeon: Nisha Reed MD;  [facility];  Service: Urology    VT CYSTOURETHROSCOPY,BIOPSY N/A 8/16/2016    Procedure: CYSTOSCOPY WITH BIOPSIES;  Surgeon: Rockie Lombard, MD;  [facility];  Service: Urology    VT CYSTOURETHROSCOPY,FULGUR <0 5 CM LESN N/A 11/19/2020    Procedure: CYSTO W/BIOPSIES, transurethral prostate bx;  Surgeon: Ray Gaston MD;  [facility];  Service: Urology    VT CYSTOURETHROSCOPY,FULGUR >5 CM LESN Bilateral 10/18/2021    Procedure: TRANSURETHRAL RESECTION OF BLADDER TUMOR (TURBT);   Surgeon: Patricio Herrera MD;  [facility];  Service: Urology    VT [facility] 3RD+ ORD SLCTV ABDL PEL[facility] Left 2/8/2021    Procedure: LEG angiogram, CO2 w/limited contrast with balloon angioplasty postertior tibial artery;  Surgeon: Sally Ochoa MD;  [facility];  Service: Vascular    ROTATOR CUFF REPAIR      SMALL INTESTINE SURGERY      Surgery Shaji-en-Y    SPINAL FUSION      lumbar and cervical fusions    VAC DRESSING APPLICATION Right 5/16/4774    Procedure: APPLICATION VAC DRESSING;  Surgeon: Cintia Fierro DPM;  [facility];  Service:     WOUND DEBRIDEMENT Left 2/16/2021    Procedure: FOOT DEBRIDE, [address] OUT w/graft application;  Surgeon: Cintia Fierro DPM;  [facility];  Service: Podiatry     Family History   Problem Relation Age of Onset    Diabetes Mother     Heart disease Mother     Other Mother         High blood pressure    Heart disease Father    Sharif Gracia Diabetes Sister     Other Sister         High blood pressure    Kidney disease Sister     Heart disease Brother     Other Brother         High blood pressure       Shen Meléndez [place]  Date: 4/12/2022 Time: 10:22 AM

## 2022-04-12 NOTE — ASSESSMENT & PLAN NOTE
75-year-old gentleman with CAD, CKD, diabetes, and bladder cancer who presents with hematuria in setting of cystitis and penile pain  · Bladder cancer status post TURBT and BCG, bilateral nephrostomy tubes  · Following with [facility], not a radical cystectomy candidate due to comorbidities  · Continues to have hematuria  · Plan for cystoscopy 4/12/2022, NPO   · Appreciate palliative care recommendations for pain control

## 2022-04-13 ENCOUNTER — TELEPHONE (OUTPATIENT)
Dept: PALLIATIVE MEDICINE | Facility: CLINIC | Age: 77
End: 2022-04-13

## 2022-04-13 PROBLEM — G89.3 CANCER RELATED PAIN: Status: ACTIVE | Noted: 2022-04-13

## 2022-04-13 LAB
GLUCOSE SERPL-MCNC: 106 MG/DL (ref 65–140)
GLUCOSE SERPL-MCNC: 121 MG/DL (ref 65–140)
GLUCOSE SERPL-MCNC: 127 MG/DL (ref 65–140)
GLUCOSE SERPL-MCNC: 269 MG/DL (ref 65–140)

## 2022-04-13 PROCEDURE — 99232 SBSQ HOSP IP/OBS MODERATE 35: CPT | Performed by: INTERNAL MEDICINE

## 2022-04-13 PROCEDURE — 82948 REAGENT STRIP/BLOOD GLUCOSE: CPT

## 2022-04-13 PROCEDURE — 99232 SBSQ HOSP IP/OBS MODERATE 35: CPT | Performed by: PHYSICIAN ASSISTANT

## 2022-04-13 RX ORDER — OXYCODONE HCL 10 MG/1
10 TABLET, FILM COATED, EXTENDED RELEASE ORAL EVERY 8 HOURS SCHEDULED
Qty: 45 TABLET | Refills: 0 | Status: SHIPPED | OUTPATIENT
Start: 2022-04-13 | End: 2022-04-28

## 2022-04-13 RX ORDER — OXYCODONE HCL 10 MG/1
10 TABLET, FILM COATED, EXTENDED RELEASE ORAL EVERY 8 HOURS SCHEDULED
Status: DISCONTINUED | OUTPATIENT
Start: 2022-04-13 | End: 2022-04-14 | Stop reason: HOSPADM

## 2022-04-13 RX ORDER — OXYCODONE HYDROCHLORIDE 5 MG/1
5-10 TABLET ORAL EVERY 6 HOURS PRN
Qty: 60 TABLET | Refills: 0
Start: 2022-04-13 | End: 2022-04-23

## 2022-04-13 RX ADMIN — EZETIMIBE 10 MG: 10 TABLET ORAL at 05:58

## 2022-04-13 RX ADMIN — DULOXETINE HYDROCHLORIDE 20 MG: 20 CAPSULE, DELAYED RELEASE PELLETS ORAL at 08:30

## 2022-04-13 RX ADMIN — PANTOPRAZOLE SODIUM 20 MG: 20 TABLET, DELAYED RELEASE ORAL at 05:58

## 2022-04-13 RX ADMIN — Medication 1000 MG: at 08:31

## 2022-04-13 RX ADMIN — SERTRALINE HYDROCHLORIDE 50 MG: 50 TABLET ORAL at 08:28

## 2022-04-13 RX ADMIN — Medication 325 MG: at 08:28

## 2022-04-13 RX ADMIN — POLYETHYLENE GLYCOL 3350 17 G: 17 POWDER, FOR SOLUTION ORAL at 08:28

## 2022-04-13 RX ADMIN — DOCUSATE SODIUM 100 MG: 100 CAPSULE, LIQUID FILLED ORAL at 18:46

## 2022-04-13 RX ADMIN — GABAPENTIN 300 MG: 300 CAPSULE ORAL at 22:20

## 2022-04-13 RX ADMIN — INSULIN GLARGINE 10 UNITS: 100 INJECTION, SOLUTION SUBCUTANEOUS at 22:21

## 2022-04-13 RX ADMIN — OXYBUTYNIN 10 MG: 10 TABLET, FILM COATED, EXTENDED RELEASE ORAL at 08:27

## 2022-04-13 RX ADMIN — BIMATOPROST 1 DROP: 0.1 SOLUTION/ DROPS OPHTHALMIC at 23:00

## 2022-04-13 RX ADMIN — METOPROLOL SUCCINATE 100 MG: 100 TABLET, EXTENDED RELEASE ORAL at 08:27

## 2022-04-13 RX ADMIN — ISOSORBIDE MONONITRATE 30 MG: 30 TABLET, EXTENDED RELEASE ORAL at 05:58

## 2022-04-13 RX ADMIN — HYDROMORPHONE HYDROCHLORIDE 0.5 MG: 1 INJECTION, SOLUTION INTRAMUSCULAR; INTRAVENOUS; SUBCUTANEOUS at 22:21

## 2022-04-13 RX ADMIN — SENNOSIDES 17.2 MG: 8.6 TABLET ORAL at 08:28

## 2022-04-13 RX ADMIN — OXYCODONE HYDROCHLORIDE 10 MG: 10 TABLET ORAL at 00:42

## 2022-04-13 RX ADMIN — INSULIN LISPRO 5 UNITS: 100 INJECTION, SOLUTION INTRAVENOUS; SUBCUTANEOUS at 18:46

## 2022-04-13 RX ADMIN — CALCITRIOL CAPSULES 0.25 MCG 0.25 MCG: 0.25 CAPSULE ORAL at 08:28

## 2022-04-13 RX ADMIN — DOCUSATE SODIUM 100 MG: 100 CAPSULE, LIQUID FILLED ORAL at 08:28

## 2022-04-13 RX ADMIN — B-COMPLEX W/ C & FOLIC ACID TAB 1 TABLET: TAB at 08:30

## 2022-04-13 RX ADMIN — ALPRAZOLAM 0.25 MG: 0.25 TABLET ORAL at 22:25

## 2022-04-13 RX ADMIN — OXYCODONE HYDROCHLORIDE 10 MG: 10 TABLET, FILM COATED, EXTENDED RELEASE ORAL at 22:20

## 2022-04-13 RX ADMIN — HYDROMORPHONE HYDROCHLORIDE 0.5 MG: 1 INJECTION, SOLUTION INTRAMUSCULAR; INTRAVENOUS; SUBCUTANEOUS at 05:58

## 2022-04-13 RX ADMIN — OXYCODONE HYDROCHLORIDE 10 MG: 10 TABLET, FILM COATED, EXTENDED RELEASE ORAL at 10:25

## 2022-04-13 RX ADMIN — OXYCODONE HYDROCHLORIDE 10 MG: 10 TABLET ORAL at 08:27

## 2022-04-13 RX ADMIN — INSULIN LISPRO 5 UNITS: 100 INJECTION, SOLUTION INTRAVENOUS; SUBCUTANEOUS at 12:39

## 2022-04-13 RX ADMIN — INSULIN LISPRO 5 UNITS: 100 INJECTION, SOLUTION INTRAVENOUS; SUBCUTANEOUS at 08:31

## 2022-04-13 RX ADMIN — OXYCODONE HYDROCHLORIDE 10 MG: 10 TABLET ORAL at 18:45

## 2022-04-13 NOTE — TELEPHONE ENCOUNTER
Received prior authorization approval for oxycodone ER 10mg tablets  Approval valid from 04/13/2022 through 04/13/2024    Faxed results to Pharmacy  Pharmacy has been asked to inform pt of outcome

## 2022-04-13 NOTE — PROGRESS NOTES
[facility]  Progress Note - Surendra Alfred 1945, 76 y o  male MRN: [id number]  Unit/Bed#: E2 -01 Encounter: [id number]  Primary Care Provider: Fernando Fitzgerald MD   Date and time admitted to hospital: 4/6/2022 10:30 PM    * Gross hematuria  Assessment & Plan  20-year-old gentleman with CAD, CKD, diabetes, and bladder cancer who presents with hematuria in setting of cystitis and penile pain  · Bladder cancer status post TURBT and BCG, bilateral nephrostomy tubes  · Following with [facility], not a radical cystectomy candidate due to comorbidities  · Status post Cystoscopy 4/12 with generalized mucosal edema/irritation and a crow CIS related to underlying malignancy and chemo radiation treatments  No active mucosal bleeding  Several biopsies were obtained  · Continues to have Intermittent hematuria  Urology notes this will likely continue to be the case  · Hemoglobin has remained stable  · Appreciate palliative care recommendations for pain control  · Ongoing follow-up with Urology, Oncology    Cancer related pain  Assessment & Plan  He complains of chronic penile and pelvic pain  Urology evaluated and cystoscopy without source for pain other than known malignancy  Patient established with palliative care as an outpatient  Previously managed with Oxy IR 5-10 mg p o  Q 6 hours p r n  For moderate to severe pain  Patient has had minimal improvement inpatient on current regimen and required IV Dilaudid  Palliative care evaluated-will initiate Oxy ER 10 mg q 8 hours scheduled  Continue Oxy 5-10 mg p o  Q 6 hours  Scripts sent to pharmacy along with Narcan      Bilateral hydronephrosis  Assessment & Plan  Status post bilateral PCNs    CKD (chronic kidney disease) stage 4, GFR 15-29 ml/min ([facility])  Assessment & Plan  · Currently at baseline    PAD (peripheral artery disease) ([facility])  Assessment & Plan  · History of PAD with history of left lower extremity angioplasty    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)  Assessment & Plan  · Known bladder carcinoma followed outpatient by Urology and Oncology  · Evaluated by inpatient Oncology  · Status post cystoscopy with several biopsies  · Follow-up as outpatient  · Outpatient follow-up with primary oncologist    Acute cystitis with hematuria  Assessment & Plan  · Urine culture with citrobacter and serratia with multiple sensitivities  · Completed adequate course of IV Rocephin    Type 2 diabetes mellitus, with long-term current use of insulin [facility])  Assessment & Plan  Lab Results   Component Value Date    HGBA1C 7 7 (H) 2021     Recent Labs     22  1712 22  20322  0613 22  1110   POCGLU 171* 374* 127 269*     · Continue 10 units of Lantus at bedtime, 5 units of Humalog t i d  With meals    Hypertension with renal disease  Assessment & Plan  · Continue metoprolol       VTE Pharmacologic Prophylaxis:  On hold due to hematuria    Patient Centered Rounds:  Patient care rounds were performed with nursing    Discussions with Specialists or Other Care Team Provider:  Palliative care    Education and Discussions with Family / Patient:  Patient    Time Spent for Care: 30  More than 50% of total time spent on counseling and coordination of care as described above  Current Length of Stay: 6 day(s)    Current Patient Status: Inpatient   Certification Statement: The patient will continue to require additional inpatient hospital stay due to pain control, hematuria    Discharge Plan:  Discharge tomorrow if patient is voiding well and can be weaned from IV pain medication    Code Status: Level 1 - Full Code      Subjective:   Patient seen evaluated at bedside  He is quite concerned because he is having difficulty urinating and continues to have hematuria  He also still requiring IV pain medication       Objective:     Vitals:   Temp (24hrs), Av 3 °F (36 3 °C), Min:96 7 °F (35 9 °C), Max:98 1 °F (36 7 °C)    Temp:  [96 7 °F (35 9 °C)-98 1 °F (36 7 °C)] 97 3 °F (36 3 °C)  HR:  [72-88] 88  Resp:  [16-20] 18  BP: (106-162)/(57-94) 106/57  SpO2:  [96 %-100 %] 98 %  Body mass index is 30 68 kg/m²  Input and Output Summary (last 24 hours): Intake/Output Summary (Last 24 hours) at 4/13/2022 1220  Last data filed at 4/13/2022 0501  Gross per 24 hour   Intake 600 ml   Output 1125 ml   Net -525 ml       Physical Exam:     Physical Exam  Vitals reviewed  Constitutional:       General: He is not in acute distress  Appearance: He is well-developed  He is not ill-appearing, toxic-appearing or diaphoretic  HENT:      Head: Normocephalic and atraumatic  Mouth/Throat:      Mouth: Mucous membranes are moist       Pharynx: No oropharyngeal exudate  Eyes:      General: No scleral icterus  Extraocular Movements: Extraocular movements intact  Conjunctiva/sclera: Conjunctivae normal    Cardiovascular:      Rate and Rhythm: Normal rate and regular rhythm  Heart sounds: Normal heart sounds  Pulmonary:      Effort: Pulmonary effort is normal  No respiratory distress  Breath sounds: Normal breath sounds  No wheezing or rales  Abdominal:      General: There is no distension  Palpations: Abdomen is soft  Tenderness: There is no abdominal tenderness  There is no guarding or rebound  Musculoskeletal:         General: No swelling, tenderness or deformity  Skin:     General: Skin is warm and dry  Neurological:      General: No focal deficit present  Mental Status: He is alert  Mental status is at baseline  Psychiatric:         Mood and Affect: Mood normal          Behavior: Behavior normal          Thought Content: Thought content normal          Judgment: Judgment normal          Additional Data:     Labs:  I have reviewed pertinent results     Results from last 7 days   Lab Units 04/12/22  0509 04/10/22  0634 04/09/22  0958   WBC Thousand/uL 5 51   < > 8 48   HEMOGLOBIN g/dL 8 8*   < > 9 0*   HEMATOCRIT % 26 3*   < > 27 3*   PLATELETS Thousands/uL 187   < > 155   NEUTROS PCT %  --   --  73   LYMPHS PCT %  --   --  16   MONOS PCT %  --   --  9   EOS PCT %  --   --  1    < > = values in this interval not displayed  Results from last 7 days   Lab Units 04/12/22  0509 04/11/22  0550 04/11/22  0550   SODIUM mmol/L 137   < > 138   POTASSIUM mmol/L 4 4   < > 4 2   CHLORIDE mmol/L 104   < > 105   CO2 mmol/L 23   < > 24   BUN mg/dL 52*   < > 48*   CREATININE mg/dL 2 93*   < > 2 71*   ANION GAP mmol/L 10   < > 9   CALCIUM mg/dL 8 7   < > 8 6   ALBUMIN g/dL  --   --  2 6*   TOTAL BILIRUBIN mg/dL  --   --  0 24   ALK PHOS U/L  --   --  112   ALT U/L  --   --  34   AST U/L  --   --  22   GLUCOSE RANDOM mg/dL 184*   < > 101    < > = values in this interval not displayed       Results from last 7 days   Lab Units 04/06/22  2336   INR  1 07     Results from last 7 days   Lab Units 04/13/22  1110 04/13/22  0613 04/12/22  2031 04/12/22  1712 04/12/22  1358 04/12/22  0624 04/11/22  2102 04/11/22  1634 04/11/22  1126 04/11/22  0611 04/10/22  2034 04/10/22  1536   POC GLUCOSE mg/dl 269* 127 374* 171* 167* 184* 214* 157* 145* 105 169* 259*                 Imaging: I have reviewed pertinent imaging       Recent Cultures (last 7 days):     Results from last 7 days   Lab Units 04/07/22  0057   URINE CULTURE  40,000-49,000 cfu/ml Serratia marcescens*  10,000-19,000 cfu/ml Citrobacter freundii*       Last 24 Hours Medication List:   Current Facility-Administered Medications   Medication Dose Route Frequency Provider Last Rate    acetaminophen  650 mg Oral Q6H PRN Any Manuel MD      ALPRAZolam  0 25 mg Oral TID PRN Any Manuel MD      belladonna-opium  30 mg Rectal Q8H PRN Any Manuel MD      bimatoprost  1 drop Both Eyes HS Any Manuel MD      calcitriol  0 25 mcg Oral Daily Any Manuel MD      docusate sodium  100 mg Oral BID Any Manuel MD      DULoxetine  20 mg Oral Daily Any Manuel, MD      ezetimibe  10 mg Oral Early Morning Any Manuel MD      ferrous sulfate  325 mg Oral Daily With Breakfast Any Manuel MD      fluticasone  1 spray Nasal Daily PRN Any Manuel MD      gabapentin  300 mg Oral HS Any Manuel MD      HYDROmorphone  0 5 mg Intravenous Q4H PRN Any Manuel MD      insulin glargine  10 Units Subcutaneous HS Any Manuel MD      insulin lispro  1-6 Units Subcutaneous 4x Daily (AC & HS) Any Manuel MD      insulin lispro  5 Units Subcutaneous TID With Meals Any Manuel MD      isosorbide mononitrate  30 mg Oral Early Morning Any Manuel MD      lactated ringers  1,000 mL Intravenous Once PRN Any Manuel MD      And    lactated ringers  1,000 mL Intravenous Once PRN Any Manuel MD      metoprolol succinate  100 mg Oral Daily Any Manuel MD      multivitamin stress formula  1 tablet Oral Daily Any Manuel MD      oxybutynin  10 mg Oral Daily Any Manuel MD      oxyCODONE  10 mg Oral [facility] Mary Jo Yee MD      oxyCODONE  10 mg Oral Q6H PRN Any Manuel MD      oxyCODONE  5 mg Oral Q6H PRN Any Manuel MD      pantoprazole  20 mg Oral Early Morning Any Manuel MD      polyethylene glycol  17 g Oral Daily Any Manuel MD      senna  2 tablet Oral Daily Any Manuel MD      sertraline  50 mg Oral Daily Any Manuel MD      sodium chloride  1,000 mL Intravenous Once PRN Any Manuel MD      And    sodium chloride  1,000 mL Intravenous Once PRN Any Manuel MD          Today, Patient Was Seen By: Ariana Thorne DO    ** Please Note: Dictation voice to text software may have been used in the creation of this document   **

## 2022-04-13 NOTE — PALLIATIVE CARE CONFERENCE
Palliative MSW saw patient at the bedside today  MSW appreciates the opportunity to provider patient/family with inpatient emotional support and guidance while patient continues to receive medical attention from the medical team     Topics discussed: Pt was taking a nap, however he was agreeable for a visit from Dr Mackenzie Centeno and I  I reintroduced myself, as I met him in [facility] as an outpatient  Dr Mackenzie Centeno spoke about his pain and informed him that she has prescribed a long acting pain medication  She also explained how to take the short acting  Pt will continue to follow as an outpatient       Areas that need follow-up: Support as able as outpatient  Resources given:None  Others present:  Dr Mackenzie Centeno    MSW will continue to follow as requested by the medical team, patient, or family

## 2022-04-13 NOTE — ASSESSMENT & PLAN NOTE
· Known bladder carcinoma followed outpatient by Urology and Oncology  · Evaluated by inpatient Oncology  · Status post cystoscopy with several biopsies  · Follow-up as outpatient  · Outpatient follow-up with primary oncologist

## 2022-04-13 NOTE — TELEPHONE ENCOUNTER
Prior authorization for pt's Oxycontin 10mg via CM has been initiated and sent along with last office note  PA submission, contact [employer]  [phone]  Awaiting determination

## 2022-04-13 NOTE — PLAN OF CARE
Problem: Potential for Falls  Goal: Patient will remain free of falls  Description: INTERVENTIONS:  - Educate patient/family on patient safety including physical limitations  - Instruct patient to call for assistance with activity   - Consult OT/PT to assist with strengthening/mobility   - Keep Call bell within reach  - Keep bed low and locked with side rails adjusted as appropriate  - Keep care items and personal belongings within reach  - Initiate and maintain comfort rounds  - Make Fall Risk Sign visible to staff  - Offer Toileting every 2 Hours, in advance of need  - Initiate/Maintain bed alarm  - Obtain necessary fall risk management equipment: walker or cane, call bell, gripper socks, increased rounds  - Apply yellow socks and bracelet for high fall risk patients  - Consider moving patient to room near nurses station  Outcome: Progressing

## 2022-04-13 NOTE — ANESTHESIA POSTPROCEDURE EVALUATION
Post-Op Assessment Note    CV Status:  Stable    Pain management: adequate     Mental Status:  Alert and awake   Hydration Status:  Euvolemic   PONV Controlled:  Controlled   Airway Patency:  Patent      Post Op Vitals Reviewed: Yes      Staff: Anesthesiologist         No complications documented      BP      Temp      Pulse     Resp      SpO2      /63   Pulse 75   Temp 97 7 °F (36 5 °C) (Tympanic)   Resp 20   Ht 6' (1 829 m)   Wt 103 kg (226 lb 3 1 oz)   SpO2 96%   BMI 30 68 kg/m²

## 2022-04-13 NOTE — PROGRESS NOTES
Progress Note - Urology  Racheal Foss 1945, 76 y o  male MRN: [id number]    Unit/Bed#: E2 -01 Encounter: [id number]      Bilateral hydronephrosis  Assessment & Plan  · Managed with BL PCN placed 2/25  · Patient draining clear yellow urine no issues    Malignant neoplasm of anterior wall of urinary bladder [facility])  Assessment & Plan  · Treatment evaluation at [facility]  Deemed not to be ideal surgical candidate  Received chemoradiation over the past month  · Appreciate multidisciplinary management with medical oncology, radiation oncology, and palliative medicine teams    Acute cystitis with hematuria  Assessment & Plan  · Gross hematuria in the setting of known bladder malignancy and acute on chronic cystitis  · Hgb stable 9 0-- 9 0--8 6  · Urine culture 4/5 with Serratia marcescens, susceptible to IV Rocephin- near completion  · Now POD#1 diagnostic cystoscopy several bladder biopsies, prostatic urethral biopsy taken  Generalized mucosal edema/irritation and necrosis related to underlying malignancy and chemoradiation treatments  No active mucosal bleeding  It is possible/likely he may continue with chronic intermittent hematuria in the small amount of urine that he does pass per urethra  · No change in trajectory of treatment/management of his recurrent and refractory cancer  · Appreciate palliative care input on pain regimen  B&O suppositories can also be used if able to fill rx at pharmacy; has been helpful for managing his chronic cancer related pain and spasm      Subjective: doing well today no fevers no nausea eating fine  PCN urines are clear yellow, small volume urine per urethra is watermelon color and clear  Penile and rectal pain is unchanged but b&o helps quite a bit  Reports he is going home tomorrow has palliative oncology and urology f/u's established   No new issues today    Review of Systems   Constitutional: Negative for activity change, appetite change, chills, fever and unexpected weight change  HENT: Negative  Respiratory: Negative  Negative for shortness of breath  Cardiovascular: Negative  Negative for chest pain  Gastrointestinal: Positive for rectal pain  Negative for abdominal pain, diarrhea, nausea and vomiting  Endocrine: Negative  Genitourinary: Positive for penile pain  Negative for decreased urine volume, difficulty urinating, dysuria, flank pain, frequency, hematuria (urethral only  renal urine is clear yellow), penile swelling, scrotal swelling, testicular pain and urgency  Musculoskeletal: Negative for back pain and gait problem  Skin: Negative  Allergic/Immunologic: Negative  Neurological: Negative  Hematological: Negative for adenopathy  Does not bruise/bleed easily  Objective:  Vitals: Blood pressure 106/57, pulse 88, temperature (!) 97 3 °F (36 3 °C), temperature source Tympanic, resp  rate 18, height 6' (1 829 m), weight 103 kg (226 lb 3 1 oz), SpO2 98 %  ,Body mass index is 30 68 kg/m²  Intake/Output Summary (Last 24 hours) at 4/13/2022 1208  Last data filed at 4/13/2022 0501  Gross per 24 hour   Intake 600 ml   Output 1125 ml   Net -525 ml     Invasive Devices  Report    Central Venous Catheter Line            Port A Cath 01/17/22 Right Internal jugular 86 days          Drain            Nephrostomy Left 2 10 2 Fr  46 days    Nephrostomy Right 1 10 2 Fr  46 days                Physical Exam  Vitals and nursing note reviewed  Constitutional:       Appearance: He is well-developed  HENT:      Head: Normocephalic and atraumatic  Cardiovascular:      Rate and Rhythm: Normal rate and regular rhythm  Heart sounds: Normal heart sounds  No murmur heard  Pulmonary:      Effort: Pulmonary effort is normal       Breath sounds: Normal breath sounds  Abdominal:      General: Bowel sounds are normal       Palpations: Abdomen is soft        Comments: Bilateral nephrostomy tubes intact draining clear yellow urine Genitourinary:     Comments: Clear watermelon color urine seen in urinal (small volume per urethra)  Musculoskeletal:         General: Normal range of motion  Skin:     General: Skin is warm and dry  Capillary Refill: Capillary refill takes less than 2 seconds  Coloration: Skin is not pale  Neurological:      Mental Status: He is alert and oriented to person, place, and time  Labs:  Recent Labs     04/11/22  0550 04/12/22  0509   WBC 6 03 5 51     Recent Labs     04/11/22  0550 04/12/22  0509   HGB 8 6* 8 8*       Recent Labs     04/11/22  0550 04/12/22  0509   CREATININE 2 71* 2 93*       History:    Past Medical History:   Diagnosis Date    Anemia     Last assessed: 9/28/17    Anxiety     Arteriosclerotic cardiovascular disease     Last assessed: 9/28/17    Arthritis     Bladder cancer ([facility])     bladder- had BCG treatments    Chronic kidney disease     Stage IV    CKD (chronic kidney disease) stage 4, GFR 15-29 ml/min ([facility])     Colon polyp     Coronary artery disease     7 stents    Depression     Diabetes mellitus ([facility])     IDDM    GERD (gastroesophageal reflux disease)     Glaucoma     Hematuria     History of fusion of cervical spine     Hyperlipidemia     Hypertension     Insomnia     Last assessed: 11/14/12    Loss of hearing     has hearing aids but usually does not wear them    Other seasonal allergic rhinitis     Last assessed: 2/10/16    PAD (peripheral artery disease) ([facility])     Shortness of breath     on exertion    Spinal stenosis of lumbar region     Transient cerebral ischemia     No Residual    Uses walker     w/c for longer distances     Past Surgical History:   Procedure Laterality Date    CARDIAC SURGERY      Cath stent placement  Last assessed: 3/9/17  Interventional Catheterization    CHOLECYSTECTOMY      COLONOSCOPY      CYSTOSCOPY      Diagnostic w/biopsy  Penney Fothergill   Last assessed: 12/1/14    CYSTOSCOPY N/A 4/12/2022    Procedure: CYSTOSCOPY  Bladder biopsies  ;  Surgeon: Reginald Mock MD;  [facility];  Service: Urology    CYSTOSCOPY W/ RETROGRADES Right 3/1/2022    Procedure: CYSTO; stent removal retrograde;  Surgeon: Reginald Mock MD;  [facility];  Service: Urology    CYSTOSCOPY W/ URETERAL STENT PLACEMENT Bilateral 10/18/2021    Procedure: bilateral retrogrades, cytology collection;  Surgeon: Reginald Mock MD;  [facility];  Service: Urology    CYSTOURETHROSCOPY      w/cautery  Isabel Bis    FL RETROGRADE PYELOGRAM  10/18/2021    FL RETROGRADE PYELOGRAM  10/24/2021    GASTRIC BYPASS      For morbid obesity w/Shaji-en-Y   Resolved: 11/17/09    INCISION AND DRAINAGE OF WOUND Right 2/26/2017    Procedure: INCISION AND DRAINAGE (I&D) EXTREMITY WITH APPLICATION OF GRAFT JACKET;  Surgeon: Jesus Jones DPM;  [facility];  Service:     INCISION AND DRAINAGE OF WOUND Right 4/25/2017    Procedure: INCISION AND DRAINAGE (I&D) EXTREMITY, APPLICATION OF GRAFT;  Surgeon: Jesus Jones DPM;  [facility];  Service:     IR BIOPSY OTHER  7/2/2020    IR LOWER EXTREMITY ANGIOGRAM  2/8/2021    IR LOWER EXTREMITY ANGIOGRAM  2/11/2021    IR NEPHROSTOMY TUBE PLACEMENT  2/25/2022    IR PORT PLACEMENT  1/17/2022    IR TUNNELED CENTRAL LINE PLACEMENT  12/24/2020    JOINT REPLACEMENT      christofer knees replaced    WI AMPUTATION METATARSAL+TOE,SINGLE Left 12/21/2020    Procedure: RAY RESECTION FOOT;  Surgeon: Reed Rod DPM;  [facility];  Service: Podiatry    WI AMPUTATION METATARSAL+TOE,SINGLE Left 12/31/2020    Procedure: 5TH MET RESECTION;  Surgeon: Reed Rod DPM;  [facility];  Service: Podiatry    WI CYSTOURETHROSCOPY W/IRRIG & EVAC CLOTS N/A 2/10/2021    Procedure: CYSTOSCOPY EVACUATION OF CLOTS, fulguration;  Surgeon: Rupali Motta MD;  [facility];  Service: Urology    WI CYSTOURETHROSCOPY W/IRRIG & EVAC CLOTS N/A 10/24/2021    Procedure: CYSTOSCOPY EVACUATION OF CLOT, fulguration of bleeding vessels, right ureter stent placement, retrograde pyelogram;  Surgeon: Raghav Amos MD;  [facility];  Service: Urology    NV CYSTOURETHROSCOPY,BIOPSY N/A 8/16/2016    Procedure: CYSTOSCOPY WITH BIOPSIES;  Surgeon: Pardeep Vazquez MD;  [facility];  Service: Urology    NV CYSTOURETHROSCOPY,FULGUR <0 5 CM LESN N/A 11/19/2020    Procedure: CYSTO W/BIOPSIES, transurethral prostate bx;  Surgeon: Cheo Michel MD;  [facility];  Service: Urology    NV CYSTOURETHROSCOPY,FULGUR >5 CM LESN Bilateral 10/18/2021    Procedure: TRANSURETHRAL RESECTION OF BLADDER TUMOR (TURBT);   Surgeon: Abbey Bowen MD;  [facility];  Service: Urology    NV [address] [facility] 3RD+ ORD [address] PEL/TR [facility] Left 2/8/2021    Procedure: LEG angiogram, CO2 w/limited contrast with balloon angioplasty postertior tibial artery;  Surgeon: Suman Alston MD;  [facility];  Service: Vascular    ROTATOR CUFF REPAIR      SMALL INTESTINE SURGERY      Surgery Shaji-en-Y    SPINAL FUSION      lumbar and cervical fusions    VAC DRESSING APPLICATION Right 8/04/1734    Procedure: APPLICATION VAC DRESSING;  Surgeon: Bozena Bell DPM;  [facility];  Service:    [address] [place] [address] Left 2/16/2021    Procedure: FOOT DEBRIDE, [address] OUT w/graft application;  Surgeon: Bozena Bell DPM;  [facility];  Service: Podiatry     Family History   Problem Relation Age of Onset    Diabetes Mother     Heart disease Mother     Other Mother         High blood pressure    Heart disease Father     Diabetes Sister     Other Sister         High blood pressure    Kidney disease Sister     Heart disease Brother     Other Brother         High blood pressure     Social History     Socioeconomic History    Marital status: /Civil Union     Spouse name: None    Number of children: None    Years of education: None    Highest education level: None   Occupational History    None   Tobacco Use    Smoking status: Former Smoker     Packs/day: 3 00     Years: 27 00     Pack years: 81 00     Types: Cigarettes     Quit date: 1970     Years since quittin 3    Smokeless tobacco: Never Used   Vaping Use    Vaping Use: Never used   Substance and Sexual Activity    Alcohol use: Never     Comment: beer / liquor    Drug use: Not Currently     Types: Marijuana     Comment: quit 2019 had medical marijuana    Sexual activity: Not Currently   Other Topics Concern    None   Social History Narrative    Consumes 1 cup of coffee and 1 soda per day     Social Determinants of Health     Financial Resource Strain: Not on file   Food Insecurity: Not on file   Transportation Needs: Not on file   Physical Activity: Not on file   Stress: Not on file   Social Connections: Not on file   Intimate Partner Violence: Not on file   Housing Stability: Not on file         Buddy Bonilla  Date: 2022 Time: 12:08 PM

## 2022-04-13 NOTE — ASSESSMENT & PLAN NOTE
· Urine culture with citrobacter and serratia with multiple sensitivities    · Completed adequate course of IV Rocephin

## 2022-04-13 NOTE — PROGRESS NOTES
Progress Note - Palliative & Supportive Care  Gerald Scherer  76 y o   male  Jonathan 2 /E2 MS 12-*   MRN: [id number]  Encounter: [id number]     ASSESSMENT:    Patient Active Problem List   Diagnosis    Coronary artery disease of native artery of native heart with stable angina pectoris ([facility]    [facility]    Hypertension with renal disease    Hyperlipidemia    Presence of stent in coronary artery    Type 2 diabetes mellitus, with long-term current use of insulin ([facility]    Primary osteoarthritis of left knee    Cystitis due to intravesical BCG administration    Degeneration of lumbar intervertebral disc    Back pain    Arteriosclerosis of artery of extremity ([facility]    Stable angina pectoris (HCC)    Herpes zoster without complication    Localized edema    Superficial phlebitis    Preop examination    Acute cystitis with hematuria    Acute renal failure (ARF) ([facility]    Secondary renal hyperparathyroidism ([facility]    Malignant neoplasm of anterior wall of urinary bladder ([facility])    Abnormal MRI, lumbar spine    Diabetic polyneuropathy associated with type 2 diabetes mellitus ([facility]    Dysuria    Diabetic ulcer of left foot associated with type 2 diabetes mellitus, 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 ([facility])    Acute kidney injury superimposed on CKD (HCC)    Chronic anemia    Anemia of chronic disease    Preoperative clearance    PAD (peripheral artery disease) ([facility])    Left carotid bruit    Gross hematuria    Chronic bronchitis ([facility])    Moderate major depression, single episode (HCC)    Thrombocytopenia (HCC)    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    Lumbar spondylosis    Chronic pain syndrome    Acute urinary retention    Hematuria    Continuous opioid dependence ([facility]    Port-A-Cath in place    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[facility]    Embolism and 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 ([facility])    Abnormal CT scan, bladder    Retained ureteral stent    Fall    Hyperkalemia    CKD (chronic kidney disease) stage 4, GFR 15-29 ml/min ([facility])    Bilateral hydronephrosis    Cancer related pain       Active problems addressed:  Muscle invasive bladder cancer  Acute on chronic cancer related pain  Dysuria  Hematuria  Goals of care  Drug induced constipation    PLAN:    1  Symptom management:   Add oxycontin 10mg PO q8H ATC - script sent to listed pharmacy[facility]    Change home regimen of oxyIR to 5-10mg PO q6H Prn for moderate to severe pain - has enough script at home   Senokot, colace, miralax daily to prevent OIC   Above explained to patient in detail   Our office will arrange follow up with EVANGELINA Hernandez    2  Goals:    Treatment-focused  Code status: Level 1 - Full Code   Decisional apparatus:  Patient does have capacity to make medical decisions on my exam today  If such capacity is lost, patient's substitute decision maker would default to wife [place]  Advance Directive / Living Will / POLST:  None on file    We appreciate the opportunity to participate in this patient's care  We will continue to follow  Please do not hesitate to contact our on-call provider through our clinic answering service at [phone] should you have acute symptom control concerns  INTERVAL HISTORY:  Chart reviewed  No acute events overnight  MAR reviewed  Patient seen today  Comfortable  Pain about the same  No BMs yet but tolerating diet and passing gas, no abdominal distention    Review of Systems   Constitutional: Negative for activity change, appetite change and fatigue  HENT: Negative for trouble swallowing  Respiratory: Negative for shortness of breath  Cardiovascular: Negative for chest pain  Gastrointestinal: Positive for constipation and rectal pain  Negative for abdominal pain, diarrhea, nausea and vomiting  Genitourinary: Positive for penile pain  Neurological: Negative for weakness  Psychiatric/Behavioral: Negative for sleep disturbance  The patient is not nervous/anxious  MEDICATIONS / ALLERGIES:  all current active meds have been reviewed    Allergies   Allergen Reactions    Atorvastatin Hives, Itching and Rash    Simvastatin Rash and Edema     Edema of lower legs    Statins Hives and Itching    Insulin Lispro Swelling and Edema     " Lower Legs"    Other Itching, Rash and Other (See Comments)     "EKG Patches"   "blue EKG patches"       OBJECTIVE:  /57 (BP Location: Left arm)   Pulse 88   Temp (!) 97 3 °F (36 3 °C) (Tympanic)   Resp 18   Ht 6' (1 829 m)   Wt 103 kg (226 lb 3 1 oz)   SpO2 98%   BMI 30 68 kg/m²   Physical Exam:    Physical Exam  Vitals and nursing note reviewed  Constitutional:       General: He is not in acute distress  Appearance: He is ill-appearing  He is not toxic-appearing or diaphoretic  HENT:      Head: Normocephalic and atraumatic  Eyes:      General: No scleral icterus  Right eye: No discharge  Left eye: No discharge  Pulmonary:      Effort: Pulmonary effort is normal  No respiratory distress  Abdominal:      General: Abdomen is flat  There is no distension  Musculoskeletal:         General: No swelling  Skin:     Coloration: Skin is not jaundiced or pale  Neurological:      General: No focal deficit present  Mental Status: He is alert and oriented to person, place, and time  Psychiatric:         Mood and Affect: Mood normal          Behavior: Behavior normal          Thought Content: Thought content normal          Judgment: Judgment normal          Lab Results:   I have personally reviewed pertinent labs  Imaging Studies: I have personally reviewed pertinent reports  EKG, Pathology, and Other Studies: I have personally reviewed pertinent reports  Counseling / Coordination of Care  Total floor / unit time spent today 20 minutes  Greater than 50% of total time was spent with the patient and / or family counseling and / or coordination of care   A description of the counseling / coordination of care: provided medical updates, discussed palliative care, determined competency, determined goals of care, determined POA, determined social/family support, discussed plans of care, discussed symptom management, provided psychosocial support      Mary Jo Yee MD  [facility] and Supportive Care  [phone]

## 2022-04-13 NOTE — ASSESSMENT & PLAN NOTE
He complains of chronic penile and pelvic pain  Urology evaluated and cystoscopy without source for pain other than known malignancy  Patient established with palliative care as an outpatient  Previously managed with Oxy IR 5-10 mg p o  Q 6 hours p r n  For moderate to severe pain  Patient has had minimal improvement inpatient on current regimen and required IV Dilaudid  Palliative care evaluated-will initiate Oxy ER 10 mg q 8 hours scheduled  Continue Oxy 5-10 mg p o  Q 6 hours  Scripts sent to pharmacy along with Narcan

## 2022-04-13 NOTE — ASSESSMENT & PLAN NOTE
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

## 2022-04-13 NOTE — ASSESSMENT & PLAN NOTE
Lab Results   Component Value Date    HGBA1C 7 7 (H) 09/03/2021     Recent Labs     04/12/22  1712 04/12/22 2031 04/13/22  0613 04/13/22  1110   POCGLU 171* 374* 127 269*     · Continue 10 units of Lantus at bedtime, 5 units of Humalog t i d   With meals

## 2022-04-14 ENCOUNTER — TRANSITIONAL CARE MANAGEMENT (OUTPATIENT)
Dept: INTERNAL MEDICINE CLINIC | Facility: CLINIC | Age: 77
End: 2022-04-14

## 2022-04-14 VITALS
HEART RATE: 90 BPM | TEMPERATURE: 97.6 F | OXYGEN SATURATION: 96 % | BODY MASS INDEX: 30.64 KG/M2 | SYSTOLIC BLOOD PRESSURE: 120 MMHG | WEIGHT: 226.19 LBS | DIASTOLIC BLOOD PRESSURE: 59 MMHG | HEIGHT: 72 IN | RESPIRATION RATE: 18 BRPM

## 2022-04-14 LAB — GLUCOSE SERPL-MCNC: 305 MG/DL (ref 65–140)

## 2022-04-14 PROCEDURE — 99239 HOSP IP/OBS DSCHRG MGMT >30: CPT | Performed by: INTERNAL MEDICINE

## 2022-04-14 PROCEDURE — 82948 REAGENT STRIP/BLOOD GLUCOSE: CPT

## 2022-04-14 RX ORDER — DOCUSATE SODIUM 100 MG/1
100 CAPSULE, LIQUID FILLED ORAL 2 TIMES DAILY
Qty: 60 CAPSULE | Refills: 0 | Status: SHIPPED | OUTPATIENT
Start: 2022-04-14 | End: 2022-07-08 | Stop reason: SDUPTHER

## 2022-04-14 RX ORDER — POLYETHYLENE GLYCOL 3350 17 G/17G
17 POWDER, FOR SOLUTION ORAL DAILY
Qty: 30 EACH | Refills: 0 | Status: SHIPPED | OUTPATIENT
Start: 2022-04-15 | End: 2022-05-04

## 2022-04-14 RX ADMIN — POLYETHYLENE GLYCOL 3350 17 G: 17 POWDER, FOR SOLUTION ORAL at 08:11

## 2022-04-14 RX ADMIN — Medication 325 MG: at 08:10

## 2022-04-14 RX ADMIN — B-COMPLEX W/ C & FOLIC ACID TAB 1 TABLET: TAB at 08:11

## 2022-04-14 RX ADMIN — METOPROLOL SUCCINATE 100 MG: 100 TABLET, EXTENDED RELEASE ORAL at 08:10

## 2022-04-14 RX ADMIN — CALCITRIOL CAPSULES 0.25 MCG 0.25 MCG: 0.25 CAPSULE ORAL at 08:10

## 2022-04-14 RX ADMIN — DULOXETINE HYDROCHLORIDE 20 MG: 20 CAPSULE, DELAYED RELEASE PELLETS ORAL at 08:11

## 2022-04-14 RX ADMIN — OXYBUTYNIN 10 MG: 10 TABLET, FILM COATED, EXTENDED RELEASE ORAL at 08:10

## 2022-04-14 RX ADMIN — SERTRALINE HYDROCHLORIDE 50 MG: 50 TABLET ORAL at 08:10

## 2022-04-14 RX ADMIN — PANTOPRAZOLE SODIUM 20 MG: 20 TABLET, DELAYED RELEASE ORAL at 06:34

## 2022-04-14 RX ADMIN — INSULIN LISPRO 5 UNITS: 100 INJECTION, SOLUTION INTRAVENOUS; SUBCUTANEOUS at 08:13

## 2022-04-14 RX ADMIN — SENNOSIDES 17.2 MG: 8.6 TABLET ORAL at 08:10

## 2022-04-14 RX ADMIN — EZETIMIBE 10 MG: 10 TABLET ORAL at 06:34

## 2022-04-14 RX ADMIN — ISOSORBIDE MONONITRATE 30 MG: 30 TABLET, EXTENDED RELEASE ORAL at 06:34

## 2022-04-14 RX ADMIN — OXYCODONE HYDROCHLORIDE 10 MG: 10 TABLET, FILM COATED, EXTENDED RELEASE ORAL at 06:34

## 2022-04-14 RX ADMIN — OXYCODONE HYDROCHLORIDE 5 MG: 5 TABLET ORAL at 06:34

## 2022-04-14 RX ADMIN — DOCUSATE SODIUM 100 MG: 100 CAPSULE, LIQUID FILLED ORAL at 08:10

## 2022-04-14 NOTE — DISCHARGE INSTRUCTIONS
Dear Chrissy Felton,      It was our pleasure to care for you here at [facility]  It is our hope that we were always able to exceed the expected standards for your care during your stay  You were hospitalized due to hematuria, cancer related pain   You were cared for on the 2nd floor floor under the service of Hadley So,  with the [facility] who covers for your primary care physician (PCP), Makenzie Bowden MD, while you were hospitalized  If you have any questions or concerns related to this hospitalization, you may contact us at [phone]  For follow up as well as medication refills, we recommend that you follow up with your primary care physician  A registered nurse will reach out to you by phone within a few days after your discharge to answer any additional questions that you may have after going home  However, at this time we provide for you here, the most important instructions / recommendations at discharge:     · Notable Medication Adjustments -   · Long-acting oxycodone 10 mg 3 times a day scheduled plus stool softeners and laxative to avoid opioid induced constipation  · Testing Required after Discharge -   · No further testing at this time  · Incidental findings that Require Follow Up   · None  · Important Follow Up Information -   · Follow-up with primary urologist and oncologist for ongoing management  · Follow-up with palliative Care for ongoing titration of cancer-related pain medication  · Other Instructions -   · Is very likely that you will have intermittent hematuria  Please call your urologist if he developed clots or difficulty urinating  · Please review this entire after visit summary as additional general instructions including medication list, appointments, activity, diet, any pertinent wound care, and other additional recommendations from your care team that may be provided for you        Sincerely, Jun Weston, DO

## 2022-04-14 NOTE — ASSESSMENT & PLAN NOTE
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

## 2022-04-14 NOTE — DISCHARGE SUMMARY
[facility]  Discharge- Amanda Land 1945, 76 y o  male MRN: [id number]  Unit/Bed#: E2 -01 Encounter: [id number]  Primary Care Provider: Parish Flynn MD   Date and time admitted to hospital: 4/6/2022 10:30 PM    * Gross hematuria  Assessment & Plan  70-year-old gentleman with CAD, CKD, diabetes, and bladder cancer who presents with hematuria in setting of cystitis and penile pain  · Bladder cancer status post TURBT and BCG, bilateral nephrostomy tubes  · Following with [facility], not a radical cystectomy candidate due to comorbidities  · Status post Cystoscopy 4/12 with generalized mucosal edema/irritation and a crow CIS related to underlying malignancy and chemo radiation treatments  No active mucosal bleeding  Several biopsies were obtained  · Continues to have Intermittent hematuria  Urology notes this will likely continue to be the case  · Hemoglobin has remained stable  · Appreciate palliative care recommendations for pain control  · Ongoing follow-up with Urology, Oncology    Cancer related pain  Assessment & Plan  He complains of chronic penile and pelvic pain  Urology evaluated and cystoscopy without source for pain other than known malignancy  Patient established with palliative care as an outpatient  Previously managed with Oxy IR 5-10 mg p o  Q 6 hours p r n  For moderate to severe pain  Patient has had minimal improvement inpatient on current regimen and required IV Dilaudid  Palliative care evaluated-initiated Oxy ER 10 mg q 8 hours scheduled  Continue Oxy 5-10 mg p o  Q 6 hours  Scripts sent to pharmacy along with Narcan      Bilateral hydronephrosis  Assessment & Plan  Status post bilateral PCNs    CKD (chronic kidney disease) stage 4, GFR 15-29 ml/min ([facility])  Assessment & Plan  · Currently at baseline    PAD (peripheral artery disease) ([facility])  Assessment & Plan  · History of PAD with history of left lower extremity angioplasty    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)  Assessment & Plan  · Known bladder carcinoma followed outpatient by Urology and Oncology  · Evaluated by inpatient Oncology  · Status post cystoscopy with several biopsies  · Follow-up as outpatient  · Outpatient follow-up with primary oncologist    Acute cystitis with hematuria  Assessment & Plan  · Urine culture with citrobacter and serratia with multiple sensitivities  · Completed adequate course of IV Rocephin    Type 2 diabetes mellitus, with long-term current use of insulin [facility])  Assessment & Plan  Lab Results   Component Value Date    HGBA1C 7 7 (H) 09/03/2021     Recent Labs     04/13/22  1110 04/13/22  1615 04/13/22  2045 04/14/22  0803   POCGLU 269* 121 106 305*     · Resume home meds    Hypertension with renal disease  Assessment & Plan  · Continue metoprolol       Discharging Physician / Practitioner: Jun Weston DO  PCP: Sandra Mathew MD  Admission Date:   Admission Orders (From admission, onward)     Ordered        04/07/22 0414  INPATIENT ADMISSION  Once                      Discharge Date: 04/14/22    Medical Problems             Resolved Problems  Date Reviewed: 4/10/2022    None                Consultations During Hospital Stay:  Palliative Care, Oncology, Urology    Procedures Performed:  Cystoscopy    Significant Findings / Test Results:     CT abdomen pelvis wo contrast    Result Date: 4/7/2022  Impression: 1  Interval placement of bilateral percutaneous nephrostomy tubes in place  No hydronephrosis  2   Persistent diffuse wall thickening of the urinary bladder with surrounding fat stranding likely representing known malignancy with cystitis  Workstation performed: [id number]       Test Results Pending at Discharge (will require follow up):  Bx results from cystoscopy      Outpatient Tests Requested: None    Reason for Admission:  Hematuria    Hospital Course:     Rachael Foss is a 76 y o  male With past medical history of bladder cancer status post TURBT, bilateral PCN, cancer related pain, CKD 4, diabetes, coronary artery disease who presents the hospital with hematuria  Patient was found to have acute cystitis and was started on empiric antibiotics  He was seen in consultation by Urology and underwent diagnostic cystoscopy which showed generalized mucosal edema/irritation necrosis related underlying malignancy  No active mucosal bleeding  Patient had significant penile pain and bladder discomfort during hospital stay  Patient was seen by palliative care and medications were titrated for discharge  Patient will follow-up with primary oncologist and urologist   Outpatient follow-up with palliative Care established  Please see above list of diagnoses and related plan for additional information  Condition at Discharge: stable     Discharge Day Visit / Exam:     Subjective:    Patient seen and evaluated at bedside  He feels well  Urine is clear  Pain is improved control  Vitals: Blood Pressure: 120/59 (04/14/22 0806)  Pulse: 90 (04/14/22 0806)  Temperature: 97 6 °F (36 4 °C) (04/14/22 0806)  Temp Source: Temporal (04/14/22 0806)  Respirations: 18 (04/14/22 0806)  Height: 6' (182 9 cm) (04/09/22 0900)  Weight - Scale: 103 kg (226 lb 3 1 oz) (04/09/22 0900)  SpO2: 96 % (04/14/22 0806)  Exam:   Physical Exam  Vitals reviewed  Constitutional:       General: He is not in acute distress  Appearance: He is well-developed  He is not ill-appearing, toxic-appearing or diaphoretic  HENT:      Head: Normocephalic and atraumatic  Mouth/Throat:      Mouth: Mucous membranes are moist       Pharynx: No oropharyngeal exudate  Eyes:      General: No scleral icterus  Extraocular Movements: Extraocular movements intact  Conjunctiva/sclera: Conjunctivae normal    Cardiovascular:      Rate and Rhythm: Normal rate and regular rhythm  Heart sounds: Normal heart sounds     Pulmonary:      Effort: Pulmonary effort is normal  No respiratory distress  Breath sounds: Normal breath sounds  No wheezing or rales  Abdominal:      General: There is no distension  Palpations: Abdomen is soft  Tenderness: There is no abdominal tenderness  There is no guarding or rebound  Musculoskeletal:         General: No swelling, tenderness or deformity  Skin:     General: Skin is warm and dry  Neurological:      General: No focal deficit present  Mental Status: He is alert  Mental status is at baseline  Psychiatric:         Mood and Affect: Mood normal          Behavior: Behavior normal          Thought Content: Thought content normal          Judgment: Judgment normal            Discharge instructions/Information to patient and family:   See after visit summary for information provided to patient and family  Provisions for Follow-Up Care:  See after visit summary for information related to follow-up care and any pertinent home health orders  Disposition:     Home     Discharge Statement:  I spent 60 minutes discharging the patient  This time was spent on the day of discharge  I had direct contact with the patient on the day of discharge  Greater than 50% of the total time was spent examining patient, answering all patient questions, arranging and discussing plan of care with patient as well as directly providing post-discharge instructions  Additional time then spent on discharge activities  Discharge Medications:  See after visit summary for reconciled discharge medications provided to patient and family        ** Please Note: This note has been constructed using a voice recognition system **

## 2022-04-14 NOTE — ASSESSMENT & PLAN NOTE
Lab Results   Component Value Date    HGBA1C 7 7 (H) 09/03/2021     Recent Labs     04/13/22  1110 04/13/22  1615 04/13/22 2045 04/14/22  0803   POCGLU 269* 121 106 305*     · Resume home meds

## 2022-04-14 NOTE — PLAN OF CARE
Problem: PAIN - ADULT  Goal: Verbalizes/displays adequate comfort level or baseline comfort level  Description: Interventions:  - Encourage patient to monitor pain and request assistance  - Assess pain using appropriate pain scale  - Administer analgesics based on type and severity of pain and evaluate response  - Implement non-pharmacological measures as appropriate and evaluate response  - Consider cultural and social influences on pain and pain management  - Notify physician/advanced practitioner if interventions unsuccessful or patient reports new pain  Outcome: Progressing     Problem: COPING  Goal: Pt able to verbalize concerns and demonstrate effective coping strategies  Description: INTERVENTIONS:  - Assist patient to identify coping skills, available support systems and cultural and spiritual values  - Provide emotional support, including active listening and acknowledgement of concerns of patient and caregivers  - Reduce environmental stimuli, as able  - Provide patient education  - Assess for spiritual pain/suffering and initiate spiritual care, including notification of Pastoral Care or domitila based community as needed  - Assess effectiveness of coping strategies  Outcome: Progressing  Goal: Will report anxiety at manageable levels  Description: INTERVENTIONS:  - Administer medication as ordered  - Teach and encourage coping skills  - Provide emotional support  - Assess patient/family for anxiety and ability to cope  Outcome: Progressing

## 2022-04-14 NOTE — ASSESSMENT & PLAN NOTE
He complains of chronic penile and pelvic pain  Urology evaluated and cystoscopy without source for pain other than known malignancy  Patient established with palliative care as an outpatient  Previously managed with Oxy IR 5-10 mg p o  Q 6 hours p r n  For moderate to severe pain  Patient has had minimal improvement inpatient on current regimen and required IV Dilaudid  Palliative care evaluated-initiated Oxy ER 10 mg q 8 hours scheduled  Continue Oxy 5-10 mg p o  Q 6 hours  Scripts sent to pharmacy along with Narcan

## 2022-04-15 ENCOUNTER — OFFICE VISIT (OUTPATIENT)
Dept: PALLIATIVE MEDICINE | Facility: CLINIC | Age: 77
End: 2022-04-15
Payer: MEDICARE

## 2022-04-15 VITALS
HEART RATE: 70 BPM | OXYGEN SATURATION: 97 % | RESPIRATION RATE: 26 BRPM | WEIGHT: 227.51 LBS | BODY MASS INDEX: 30.86 KG/M2 | TEMPERATURE: 97.6 F | DIASTOLIC BLOOD PRESSURE: 74 MMHG | SYSTOLIC BLOOD PRESSURE: 130 MMHG

## 2022-04-15 DIAGNOSIS — C67.9 BLADDER CARCINOMA (HCC): ICD-10-CM

## 2022-04-15 DIAGNOSIS — N18.4 CKD (CHRONIC KIDNEY DISEASE) STAGE 4, GFR 15-29 ML/MIN (HCC): Chronic | ICD-10-CM

## 2022-04-15 DIAGNOSIS — G89.3 CANCER RELATED PAIN: Primary | ICD-10-CM

## 2022-04-15 DIAGNOSIS — C67.3 MALIGNANT NEOPLASM OF ANTERIOR WALL OF URINARY BLADDER (HCC): ICD-10-CM

## 2022-04-15 PROCEDURE — 99214 OFFICE O/P EST MOD 30 MIN: CPT | Performed by: NURSE PRACTITIONER

## 2022-04-15 NOTE — PROGRESS NOTES
Outpatient Follow-Up - Palliative and Supportive Care   Eladia Jung 76 y o  male [id number]    Assessment & Plan  Problem List Items Addressed This Visit        Genitourinary    CKD (chronic kidney disease) stage 4, GFR 15-29 ml/min (HCC) (Chronic)    Bladder carcinoma (HCC)    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)       Other    Cancer related pain - Primary          Medications adjusted this encounter:  Requested Prescriptions      No prescriptions requested or ordered in this encounter     No orders of the defined types were placed in this encounter  There are no discontinued medications  Eladia Jung was seen today for symptoms and planning cares related to above illnesses  I have reviewed the patient's controlled substance dispensing history in the Prescription Drug Monitoring Program in compliance with the [facility] regulations before prescribing any controlled substances  They are invited to continue to follow with us  If there are questions or concerns, please contact us through our clinic/answering service 24 hours a day, seven days a week  April EVANGELINA Miller  [facility] Palliative and Supportive Care  [phone]      Visit Information    Accompanied By: Spouse    Source of History: Self, Spouse    History Limitations: None        History of Present Illness      Eladia Jung is a 76 y o  male who presents in follow up of symptoms related to bladder carcinoma  Pertinent issues include: symptom management, pain, neoplasm related, fatigue    The patient was admitted to [facility] from 4/6 to 4/14 for hematuria  He had a  Cystoscopy 4/12 that showed  generalized mucosal edema/irritation related to underlying malignancy and chemo radiation treatments  [facility]  He was seen by Dr Florencio Dennis of the palliative care team while admitted    He was started on OxyContin 10 mg q 8 hours and home regimen of oxycodone was changed to 5 -10 mg q 6 hours   The patient reports having enough mediation at this time however he will need prior authorization prior to next refill on both oxycodone and oxycontin  He reports adequate pain control with oxycontin 10 mg  He reports he has not taken any oxycodone for breakthrough pain since discharge  He rates his pain a 7/10  He states this is an acceptable amount of pain  He is moving his bowels effectively with use of senna and miralax  His sleep has improved with pain control and xanax  His weight is stable and his appetite is good  A majority of the visit was spent discussing concerns about his hospitalization  The patient's wife states she does not know how to get supplies for his PCN drains  Message sent to urology office for assistance  They also express being overall dissatisfied with the discharge process from [facility]  They report little to no communication with the case management team   There was a referral placed for home care which they state they did not know about  Message sent to Case management team to address concerns  Past medical, surgical, social, and family histories are reviewed and pertinent updates are made  Review of Systems   Constitutional: Positive for fatigue  Gastrointestinal: Positive for rectal pain  Genitourinary: Positive for flank pain  Musculoskeletal: Positive for back pain  All other systems reviewed and are negative  Vital Signs    /74 (BP Location: Right arm, Cuff Size: Standard)   Pulse 70   Temp 97 6 °F (36 4 °C) (Temporal)   Resp (!) 26   Wt 103 kg (227 lb 8 2 oz)   SpO2 97%   BMI 30 86 kg/m²     Physical Exam and Objective Data  Physical Exam  Vitals (in wheelchair, generally weak) and nursing note reviewed  Constitutional:       Appearance: He is well-developed  HENT:      Head: Normocephalic and atraumatic     Eyes:      Conjunctiva/sclera: Conjunctivae normal    Cardiovascular:      Rate and Rhythm: Normal rate and regular rhythm  Heart sounds: No murmur heard  Pulmonary:      Effort: Pulmonary effort is normal  No respiratory distress  Breath sounds: Normal breath sounds  Abdominal:      Palpations: Abdomen is soft  Tenderness: There is no abdominal tenderness  Genitourinary:     Comments: PCN  Musculoskeletal:      Cervical back: Neck supple  Skin:     General: Skin is warm and dry  Neurological:      Mental Status: He is alert  40+  minutes was spent face to face with his wife with greater than 50% of the time spent in counseling or coordination of care including discussions of follow up requirements, patient and family counseling/involvement in care, compliance with treatment regimen and support in serious illness  All of the patient's or agent's questions were answered during this discussion

## 2022-04-16 NOTE — CASE MANAGEMENT
Case Management Progress Note    Patient name Eladia Jung  Location [facility] 2 /E2 -* MRN [id number]  : 1945 Date 2022       LOS (days): 7  Geometric Mean LOS (GMLOS) (days): 3 80  Days to GMLOS:-3 6        OBJECTIVE:        Current admission status: Inpatient  Preferred Pharmacy:   [facility] [address] [place] [address], [facility]   [facility] [address]  Phone: [phone] Fax: [address]  [address] [place] [address]  Phone: [phone] Fax: [phone]    Primary Care Provider: Suellen Mabry MD    Primary Insurance: MEDICARE  Secondary Insurance: BLUE CROSS    PROGRESS NOTE: CM received voicemail from Jeevan [address] [place] [address], requesting update on pt's palliative care referral  Pt noted to have palliative care office visit on 4/15/2022   CM attempted to contact Jeevan, at phone number left in received voicemail [phone], unable to leave voicemail as "mailbox is full "

## 2022-04-18 ENCOUNTER — TELEPHONE (OUTPATIENT)
Dept: OTHER | Facility: OTHER | Age: 77
End: 2022-04-18

## 2022-04-18 ENCOUNTER — TELEPHONE (OUTPATIENT)
Dept: INTERNAL MEDICINE CLINIC | Facility: CLINIC | Age: 77
End: 2022-04-18

## 2022-04-18 NOTE — TELEPHONE ENCOUNTER
Patient is waiting to be discharge  Please call Eugene Ramírez with [address] to help with nephrostomy bag supplies  # [phone]

## 2022-04-19 ENCOUNTER — TELEPHONE (OUTPATIENT)
Dept: OTHER | Facility: OTHER | Age: 77
End: 2022-04-19

## 2022-04-19 DIAGNOSIS — N13.30 BILATERAL HYDRONEPHROSIS: Primary | ICD-10-CM

## 2022-04-19 RX ORDER — SODIUM CHLORIDE 9 MG/ML
10 INJECTION INTRAVENOUS DAILY
Qty: 600 ML | Refills: 3 | Status: SHIPPED | OUTPATIENT
Start: 2022-04-19

## 2022-04-19 NOTE — TELEPHONE ENCOUNTER
Returned call to Jeevan  Per Jeevan, they will be discharging patient from [facility] and are trying to set patient up with supply for his nephrostomy supplies  Per Jeevan, they have been providing him with supplies currently, however once services stop, he will need a supplier  Advised I am not familiar with suppliers for nephrostomy supplies, however can certainly look into it  Advised, IR is more familiar  Rebecca to contact IR to see if they have a supplier suggestion  Otherwise she will return call  Advised if she needs to call back, to ask to speak with myself to provide additional assistance  Rebecca agreeable

## 2022-04-19 NOTE — TELEPHONE ENCOUNTER
Rebecca from [address] calling back; she is asking for an answer regarding supplies as patients family is inquiring and upset urology has not responded  Please call

## 2022-04-19 NOTE — TELEPHONE ENCOUNTER
Patricio Herrera MD  [address] [place] [address] Urology [place] [facility]  Please help with this   Nephrostomy supplies?  Drainage bags, irrigation syringes? Michelle De Leon?             Previous Messages       ----- Message -----   From: EVANGELINA Sam   Sent: 4/15/2022   1:39 PM EDT   To: Patricio Herrera MD     I saw Jalil Gramajo in the office today who has concerns about supplies for his PCN drains   He and his wife do not know how to get supplies or who to get supplies from   I'm truly unsure who can address this but can please pass this along to the appropriate people        Thank you,     Sandy Byrne

## 2022-04-19 NOTE — TELEPHONE ENCOUNTER
Singh Ewing from [facility] called to find out the name of a supplier  for her patient, for Nephrostomy Bag supplies  She would speak to a  also if you have one there  She is requesting a call back

## 2022-04-22 ENCOUNTER — TELEPHONE (OUTPATIENT)
Dept: UROLOGY | Facility: MEDICAL CENTER | Age: 77
End: 2022-04-22

## 2022-04-24 ENCOUNTER — HOSPITAL ENCOUNTER (OUTPATIENT)
Dept: CT IMAGING | Facility: HOSPITAL | Age: 77
Discharge: HOME | End: 2022-04-24
Payer: MEDICARE

## 2022-04-24 DIAGNOSIS — C67.0 MALIGNANT NEOPLASM OF TRIGONE OF URINARY BLADDER (HCC): ICD-10-CM

## 2022-04-24 PROCEDURE — G1004 CDSM NDSC: HCPCS

## 2022-04-24 PROCEDURE — 71250 CT THORAX DX C-: CPT

## 2022-04-28 ENCOUNTER — HOSPITAL ENCOUNTER (OUTPATIENT)
Dept: RADIOLOGY | Facility: HOSPITAL | Age: 77
Discharge: HOME | End: 2022-04-28
Attending: RADIOLOGY | Admitting: RADIOLOGY
Payer: MEDICARE

## 2022-04-28 ENCOUNTER — TELEPHONE (OUTPATIENT)
Dept: UROLOGY | Facility: MEDICAL CENTER | Age: 77
End: 2022-04-28

## 2022-04-28 VITALS
HEART RATE: 68 BPM | RESPIRATION RATE: 16 BRPM | OXYGEN SATURATION: 100 % | SYSTOLIC BLOOD PRESSURE: 118 MMHG | DIASTOLIC BLOOD PRESSURE: 72 MMHG

## 2022-04-28 DIAGNOSIS — C67.9 BLADDER CANCER (HCC): ICD-10-CM

## 2022-04-28 LAB — GLUCOSE SERPL-MCNC: 125 MG/DL (ref 65–140)

## 2022-04-28 PROCEDURE — 82948 REAGENT STRIP/BLOOD GLUCOSE: CPT

## 2022-04-28 PROCEDURE — 50435 EXCHANGE NEPHROSTOMY CATH: CPT

## 2022-04-28 PROCEDURE — C1729 CATH, DRAINAGE: HCPCS

## 2022-04-28 PROCEDURE — C1769 GUIDE WIRE: HCPCS

## 2022-04-28 PROCEDURE — 50435 EXCHANGE NEPHROSTOMY CATH: CPT | Performed by: RADIOLOGY

## 2022-04-28 RX ADMIN — IOHEXOL 20 ML: 350 INJECTION, SOLUTION INTRAVENOUS at 10:41

## 2022-04-28 NOTE — SEDATION DOCUMENTATION
Pt off table to litter  States feeling better  Repeat /69    Will monitor and assure pt stable with no dizziness prior to discharge

## 2022-04-28 NOTE — BRIEF OP NOTE (RAD/CATH)
INTERVENTIONAL RADIOLOGY PROCEDURE NOTE    Date: 4/28/2022    Procedure: IR NEPHROSTOMY TUBE CHECK/CHANGE/REPOSITION/REINSERTION/UPSIZE    Preoperative diagnosis:   1  Bladder cancer (HCC)         Postoperative diagnosis: Same  Surgeon: Darnell Olivera MD     Assistant: None  No qualified resident was available  Blood loss: 0    Specimens: 0     Findings:   Bilateral 10 Armenian nephrostomy tube exchange    Right distal ureter is patent    Left distal ureter obstructed    We did not leave sutures due to pain from the suture sites    Complications: None immediate      Anesthesia: local

## 2022-04-28 NOTE — SEDATION DOCUMENTATION
Pt  C/o pain with sutures  Decision made after discussion with patient to utilize stat locks for stabilization in lieu of sutures

## 2022-04-28 NOTE — SEDATION DOCUMENTATION
Pt c/o feeling light headed while lying prone on table  Pt transferred to [facility]  BP 76/57, sats 100%    Blood sugar 125

## 2022-04-28 NOTE — TELEPHONE ENCOUNTER
I discussed results of bladder biopsy, no active cancer that was seen on our specimens  He just had his percutaneous nephrostomies changed this morning, he so on comfortable now, having some bleeding, but the tubes are draining well  We will make our visits less frequent now, he has the nephrostomy tubes changed every three months    Bladder biopsy in six months

## 2022-04-29 NOTE — TELEPHONE ENCOUNTER
Spoke with pt: he is not ready to make appt yet; he wants to speak with his wife about her schedule and then he will call back to make appt

## 2022-05-02 ENCOUNTER — TELEMEDICINE (OUTPATIENT)
Dept: RADIATION ONCOLOGY | Facility: HOSPITAL | Age: 77
End: 2022-05-02
Attending: INTERNAL MEDICINE

## 2022-05-02 DIAGNOSIS — C67.3 MALIGNANT NEOPLASM OF ANTERIOR WALL OF URINARY BLADDER (HCC): Primary | ICD-10-CM

## 2022-05-02 PROCEDURE — 99024 POSTOP FOLLOW-UP VISIT: CPT | Performed by: INTERNAL MEDICINE

## 2022-05-02 NOTE — PROGRESS NOTES
Virtual Brief Visit - Radiation Oncology   Messi Handley 1945 76 y o  male [id number]    Verification of patient location: Patient is located in the following state in which I hold an active license [place]  Encounter Provider: Jessica Townsend MD  Provider Located at   [address] [place] [address]    After connecting through Telephone, the patient was identified by name and date of birth  Patient was informed that this is a telemedicine visit and that the visit is being conducted through Telephone  My office door was closed  No one else was in the room  He acknowledged consent and understanding of privacy and security of the platform  The patient has agreed to participate and understands he can discontinue the visit at any time  Patient is aware this is a billable service  Cancer Staging  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)  Staging form: Urinary Bladder, AJCC 8th Edition  - Clinical stage from 10/18/2021: Stage II (cT2, cN0, cM0) - Signed by Linden Wallace MD on 11/17/2021  Stage prefix: Initial diagnosis    Assessment/Plan:  Messi Handley is a 64 y o  male with bladder carcinoma in situ initially diagnosed in 1033-1919, treated with multiple TURBT and BCG treatments at [facility] and he was following with Urology Oncology at [facility]  TURBT in October 2021 at [facility] revealed 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  His right renal pelvis washing cytology was suspicious for high grade urothelial carcinoma  CT C/A/P without contrast showed no metastatic disease within limitations of noncontrast technique  His case was discussed at tumor board and he was not deemed to be surgical candidate, and offered concurrent chemoRT completing on 3/24/22  He presents for 1 month EOT and notes persistent but improving hematuria and dysuria   He was seen in the ER for persistent hematuria in mid-April, with cystoscopy and bladder biopsies showing inflammation but no malignancy  He notes that his fatigue is improving  06/03/22 - CT chest abdomen pelvis wo contrast  06/10/22 - Palliative, Amisha Webb  -Per last urology note, next bladder biopsy in 6 months (October 2022)  -RTC in 6 months    No orders of the defined types were placed in this encounter  History of Present Illness   Interval History:  64year old male with muscle invasive bladder cancer, history of spinal stenosis, gastric bypass in 2011 and bladder carcinoma in situ initially diagnosed in 7090-1777, treated with multiple TURBT and BCG treatments at [facility] and he was following with Urology Oncology at [facility]  Most recent TURBT in October 2021 at [facility] revealed 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  He is not a surgical candidate  The pt completed a course of radiation on 03/24/22  He has his EOT telemedicine visit today  Mr Gabriel Granados completed RT with an unplanned treatment break related to a ureteral stent failure requiring admission and bilateral nephrostomy tube placement  One additional fraction was added to the original prescription (55Gy in 20fx) to account the gap due to to hospitalization, please refer to physics calculation and documentation  He suffered from recurrent UTIs requiring several courses of antibiotics  He has ongoing dysuria but no longer has acute, severe bladder pain as he did previously when the stent was in place  He denies fevers, chills or flank pain  He has baseline urinary incontinence  He is without additional acute concerns  Notes decreased appetite    04/07/22 - CT abdomen pelvis wo contrast  1  Interval placement of bilateral percutaneous nephrostomy tubes in place  No hydronephrosis  2   Persistent diffuse wall thickening of the urinary bladder with surrounding fat stranding likely representing known malignancy with cystitis      04/24/22 - CT chest wo contrast  Stable 3 mm right middle lobe pulmonary nodule  Small amount of gas in the left renal collecting system  Clinical correlation for recent instrumentation  Postoperative changes as above  4/28/22 IR-guided exchange of bilateral nephrostomy tubes  He notes persistent but improving hematuria and dysuria  He was seen in the ER for persistent hematuria in mid-April, with cystoscopy and bladder biopsies showing inflammation but no malignancy  He notes that his fatigue is improving  Historical Information   Oncology History   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 [address] )     1994 Surgery    TURBT      1994 - 1995 Chemotherapy    Induction intravesical BCG x 2      8/17/2016 Surgery    TURBT      12/13/2016 Surgery    TURBT  [facility])    Bladder, left posterior wall, biopsy: High-grade urothelial carcinoma in-situ  Muscularis propria is identified with no tumor seen  Bladder, trigone, biopsy: Non-invasive high-grade papillary urothelial carcinoma, and flat urothelial carcinoma in-situ  Muscularis propria is not identified  1/4/2017 - 2/8/2017 Chemotherapy    Induction intravesical BCG     6/19/2017 Surgery    TURBT  [facility])    - Posterior wall, biopsy: Mild chronic cystitis with focal urothelial atypia  Muscularis propria is identified      - Right lateral wall, biopsy: Granulomatous cystitis  Muscularis propria is identified  - Left lateral wall, biopsy: Non invasive high-grade urothelial carcinoma  Muscularis propria is not identified         4/26/2019 Surgery    TURBT   [facility])     - bladder, right posterior wall, biopsy: Urothelial carcinoma in situ   - bladder, right lateral wall, biopsy: Small focus of urothelial carcinoma in situ  - bladder, left posterior wall, biopsy: Urothelial carcinoma in situ   - bladder, left lateral wall, biopsy: Urothelial carcinoma in situ     - bladder, trigone, biopsy: Invasive high-grade urothelial carcinoma, invading into lamina propria  No lymphovascular invasion seen  Muscularis propria not present  Separate piece showing urothelial carcinoma in situ        7/2019 -  Chemotherapy    Induction intravesical BCG x 4      11/4/2019 Surgery    TURBT  [facility])    - Superficial bladder tumor, transurethral resection: Non-invasive high grade urothelial carcinoma, with urothelial carcinoma in situ  Muscularis propria is not identified      - Bladder tumor, transurethral resection: Non-invasive high grade urothelial carcinoma, with urothelial carcinoma in situ, see note  Muscularis propria is present and free of tumor  12/9/2019 - 1/28/2020 Chemotherapy    Induction intravesical BCG+INF        6/9/2020 - 6/23/2020 Chemotherapy    Maintenance intravesical BCG+INF        11/19/2020 Biopsy    TURBT (Paul Bhatt, Dr Lexi Corbett)    A  Urinary Bladder, Left Posterior Bladder Wall:  -Extensively- denuded urothelial lined mucosa with mild chronic inflammation, vascular congestion  And edema   -Unremarkable small fragment of detrusor muscle present  -No evidence of invasive urothelial carcinoma seen     B  Urinary Bladder, Left lateral bladder Wall:  -Partially-denuded urothelial lined mucosa with mild  chronic inflammation  -Unremarkable small fragment of detrusor muscle present  -No evidence of invasive urothelial carcinoma seen     C  Urinary Bladder, Right Posterior Bladder wall:  -Urothelial lined mucosa with mild  chronic inflammation Von Brunn nests and mild urothelial atypia, possibly due to previous BCG administration   -Unremarkable small fragment of detrusor muscle present  -No evidence of invasive urothelial carcinoma seen     D  Urinary Bladder, Right Lateral Bladder Wall:  - Urothelial lined mucosa with mild  chronic inflammation   -Muscularis propria/ detrusor muscle is not present for evaluation    -No evidence of invasive urothelial carcinoma seen         E  Prostate, Prostate Tissue:  -Urothelial lined mucosa with mild chronic inflammation, prominent Von Brunn nests and Cystitis cystica   -Unremarkable small fragment of detrusor muscle present   -No evidence of malignancy seen  10/18/2021 -  Cancer Staged    Staging form: Urinary Bladder, AJCC 8th Edition  - Clinical stage from 10/18/2021: Stage II (cT2, cN0, cM0) - Signed by Frank Pelaez MD on 11/17/2021  Stage prefix: Initial diagnosis       10/18/2021 Surgery    TURBT ([facility])    A  Left posterior wall, bladder (transurethral resection):     - Urothelial tissue with small atypical cauterized focus favored to represent superficially invasive high-grade urothelial carcinoma  - Muscularis propria (detrusor muscle) present, and negative for carcinoma  - Marked edema and mucosal denudation with thermal artifact noted  B  Anterior wall, bladder (transurethral resection):      - Invasive high-grade urothelial carcinoma  - Carcinoma invades muscularis propria (detrusor muscle)  C   Left lateral wall, bladder (transurethral resection):     - Urothelial tissue with small atypical focus with marked thermal artifact; cannot exclude superficial carcinoma  - Muscularis propria (detrusor muscle) present, and negative for carcinoma        - Marked edema and mucosal denudation with thermal artifact noted     1/24/2022 - 3/26/2022 Chemotherapy    mitoMYcin (MUTAMYCIN), 12 mg/m2 = 28 7 mg (100 % of original dose 12 mg/m2), Intravenous, Once, 1 of 1 cycle  Dose modification: 12 mg/m2 (original dose 12 mg/m2, Cycle 1), 3 mg/m2 (original dose 12 mg/m2, Cycle 1)  Administration: 7 2 mg (1/24/2022)  fluorouracil (ADRUCIL) ambulatory infusion Soln, 500 mg/m2/day = 4,780 mg (50 % of original dose 1,000 mg/m2/day), Intravenous, Over 96 hours, 1 of 1 cycle, Start date: 1/18/2022, End date: 1/23/2022  Dose modification: 500 mg/m2/day (original dose 1,000 mg/m2/day, Cycle 1, Reason: Dose modified as per discussion with consulting physician)     1/24/2022 - 3/24/2022 Radiation    Pelvis:1 6X 21 / 21 275 0 5,775 61      Treatment dates:  C1: 1/24/2022 - 3/24/2022       Past Medical History:   Diagnosis Date    Anemia     Last assessed: 9/28/17    Anxiety     Arteriosclerotic cardiovascular disease     Last assessed: 9/28/17    Arthritis     Bladder cancer ([facility] [address] )     bladder- had BCG treatments    Chronic kidney disease     Stage IV    CKD (chronic kidney disease) stage 4, GFR 15-29 ml/min ([facility])     Colon polyp     Coronary artery disease     7 stents    Depression     Diabetes mellitus ([facility])     IDDM    GERD (gastroesophageal reflux disease)     Glaucoma     Hematuria     History of fusion of cervical spine     Hyperlipidemia     Hypertension     Insomnia     Last assessed: 11/14/12    Loss of hearing     has hearing aids but usually does not wear them    Other seasonal allergic rhinitis     Last assessed: 2/10/16    PAD (peripheral artery disease) ([facility])     Shortness of breath     on exertion    Spinal stenosis of lumbar region     Transient cerebral ischemia     No Residual    Uses walker     w/c for longer distances     Past Surgical History:   Procedure Laterality Date    CARDIAC SURGERY      Cath stent placement  Last assessed: 3/9/17  Interventional Catheterization    CHOLECYSTECTOMY      COLONOSCOPY      CYSTOSCOPY      Diagnostic w/biopsy  Alphonsus Slice  Last assessed: 12/1/14    CYSTOSCOPY N/A 4/12/2022    Procedure: CYSTOSCOPY  Bladder biopsies  ;  Surgeon: Froilan Mosley MD;  [facility];  Service: Urology    CYSTOSCOPY W/ RETROGRADES Right 3/1/2022    Procedure: CYSTO; stent removal retrograde;  Surgeon: Froilan Mosley MD;  [facility];  Service: Urology    CYSTOSCOPY W/ URETERAL STENT PLACEMENT Bilateral 10/18/2021    Procedure: bilateral retrogrades, cytology collection;  Surgeon: Froilan Mosley MD;  [facility];  Service: Urology   [facility] CYSTOURETHROSCOPY      w/cautery  Jason Ann    FL RETROGRADE PYELOGRAM  10/18/2021    FL RETROGRADE PYELOGRAM  10/24/2021    GASTRIC BYPASS      For morbid obesity w/Shaji-en-Y   Resolved: 11/17/09    INCISION AND DRAINAGE OF WOUND Right 2/26/2017    Procedure: INCISION AND DRAINAGE (I&D) EXTREMITY WITH APPLICATION OF GRAFT JACKET;  Surgeon: Stewart Leavitt DPM;  [facility];  Service:     INCISION AND DRAINAGE OF WOUND Right 4/25/2017    Procedure: INCISION AND DRAINAGE (I&D) EXTREMITY, APPLICATION OF GRAFT;  Surgeon: Stewart Leavitt DPM;  [facility];  Service:     IR BIOPSY OTHER  7/2/2020    IR LOWER EXTREMITY ANGIOGRAM  2/8/2021    IR LOWER EXTREMITY ANGIOGRAM  2/11/2021    IR NEPHROSTOMY TUBE CHECK/CHANGE/REPOSITION/REINSERTION/UPSIZE  4/28/2022    IR NEPHROSTOMY TUBE PLACEMENT  2/25/2022    IR PORT PLACEMENT  1/17/2022    IR TUNNELED CENTRAL LINE PLACEMENT  12/24/2020    JOINT REPLACEMENT      christofer knees replaced    NV AMPUTATION METATARSAL+TOE,SINGLE Left 12/21/2020    Procedure: RAY RESECTION FOOT;  Surgeon: Osmar Curtis DPM;  [facility];  Service: Podiatry    NV AMPUTATION METATARSAL+TOE,SINGLE Left 12/31/2020    Procedure: 5TH MET RESECTION;  Surgeon: Osmar Curtis DPM;  [facility];  Service: Podiatry    NV CYSTOURETHROSCOPY W/IRRIG & EVAC CLOTS N/A 2/10/2021    Procedure: CYSTOSCOPY EVACUATION OF CLOTS, fulguration;  Surgeon: Gi Pereira MD;  [facility];  Service: Urology    NV CYSTOURETHROSCOPY W/IRRIG & EVAC CLOTS N/A 10/24/2021    Procedure: CYSTOSCOPY EVACUATION OF CLOT, fulguration of bleeding vessels, right ureter stent placement, retrograde pyelogram;  Surgeon: Anthony Helms MD;  [facility];  Service: Urology    NV CYSTOURETHROSCOPY,BIOPSY N/A 8/16/2016    Procedure: CYSTOSCOPY WITH BIOPSIES;  Surgeon: Va Wilkins MD;  [facility];  Service: Urology    NV CYSTOURETHROSCOPY,FULGUR <0 5 CM LESN N/A 2020    Procedure: CYSTO W/BIOPSIES, transurethral prostate bx;  Surgeon: Joleen Choi MD;  [facility];  Service: Urology    [place] CYSTOURETHROSCOPY,FULGUR >5 CM LESN Bilateral 10/18/2021    Procedure: TRANSURETHRAL RESECTION OF BLADDER TUMOR (TURBT);   Surgeon: Kenisha Castro MD;  [facility];  Service: Urology    [place] [facility]RD[facility] ORD [address] [facility]    Procedure: LEG angiogram, CO2 w/limited contrast with balloon angioplasty postertior tibial artery;  Surgeon: Carmen Quinn MD;  [facility];  Service: Vascular    ROTATOR CUFF REPAIR      SMALL INTESTINE SURGERY      Surgery Shaji-en-Y    SPINAL FUSION      lumbar and cervical fusions    VAC DRESSING APPLICATION Right     Procedure: APPLICATION VAC DRESSING;  Surgeon: Mariah Ely DPM;  [facility];  Service:    [address] [place] [address] Left 2021    Procedure: FOOT DEBRIDE, [address] OUT w/graft application;  Surgeon: Mariah Ely DPM;  [facility];  Service: Podiatry     Social History   Social History     Substance and Sexual Activity   Alcohol Use Never    Comment: beer / liquor     Social History     Substance and Sexual Activity   Drug Use Not Currently    Types: Marijuana    Comment: quit 2019 had medical marijuana     Social History     Tobacco Use   Smoking Status Former Smoker    Packs/day: 3 00    Years: 27 00    Pack years: 81 00    Types: Cigarettes    Quit date: 5    Years since quittin 3   Smokeless Tobacco Never Used     Meds/Allergies     Current Outpatient Medications:     Accu-Chek Softclix Lancets lancets, Test blood sugar 4 times daily, Disp: 200 each, Rfl: 0    acetaminophen (TYLENOL) 325 mg tablet, Take 650 mg by mouth every 6 (six) hours as needed for mild pain (Patient not taking: Reported on 4/15/2022 ), Disp: , Rfl:     ALPRAZolam (XANAX) 0 25 mg tablet, At twice a day as needed for anxiety, Disp: 30 tablet, Rfl: 0    aspirin (ECOTRIN LOW STRENGTH) 81 mg EC tablet, Take 1 tablet (81 mg total) by mouth daily, Disp: , Rfl:     Azelastine HCl 0 15 % SOLN, Inhale 1 spray 2 (two) times a day, Disp: 30 mL, Rfl: 3    Bimatoprost (LUMIGAN OP), Apply 1 drop to eye daily at bedtime , Disp: , Rfl:     calcitriol (ROCALTROL) 0 25 mcg capsule, Take 1 capsule (0 25 mcg total) by mouth daily, Disp: 90 capsule, Rfl: 0    docusate sodium (COLACE) 100 mg capsule, Take 1 capsule (100 mg total) by mouth 2 (two) times a day, Disp: 60 capsule, Rfl: 0    DULoxetine (CYMBALTA) 20 mg capsule, Take 1 capsule (20 mg total) by mouth daily, Disp: 90 capsule, Rfl: 0    ezetimibe (ZETIA) 10 mg tablet, Take 1 tablet (10 mg total) by mouth daily in the early morning, Disp: 90 tablet, Rfl: 0    Ferrous Sulfate Dried (Feosol) 200 (65 Fe) MG TABS, Take 65 mg by mouth daily, Disp: 90 tablet, Rfl: 0    fluocinonide (LIDEX) 0 05 % cream, Apply topically 2 (two) times a day (Patient taking differently: Apply topically as needed ), Disp: 30 g, Rfl: 0    fluticasone (FLONASE) 50 mcg/act nasal spray, 1 spray into each nostril daily (Patient taking differently: 1 spray into each nostril as needed ), Disp: 11 mL, Rfl: 1    furosemide (LASIX) 20 mg tablet, TAKE 1 TABLET BY MOUTH AS NEEDED FOR EDEMA, Disp: 90 tablet, Rfl: 1    gabapentin (Neurontin) 300 mg capsule, Take 1 capsule (300 mg total) by mouth daily at bedtime, Disp: 30 capsule, Rfl: 2    glucose blood (Accu-Chek Martha Plus) test strip, Test blood sugar 4 times daily, Disp: 200 strip, Rfl: 0    Insulin Pen Needle 31G X 8 MM MISC, Inject 3 times a day, Disp: 100 each, Rfl: 2    isosorbide mononitrate (IMDUR) 30 mg 24 hr tablet, Take 1 tablet (30 mg total) by mouth daily in the early morning, Disp: 90 tablet, Rfl: 0    Lantus SoloStar 100 units/mL injection pen, 18 units qhs (Patient taking differently: Inject 18 Units under the skin daily at bedtime ), Disp: 30 mL, Rfl: 1    lidocaine (XYLOCAINE) 2 % topical gel, Apply topically as needed for mild pain, Disp: 30 mL, Rfl: 0    lidocaine-prilocaine (EMLA) cream, Apply topically as needed for mild pain, Disp: 30 g, Rfl: 0    loperamide (IMODIUM) 2 mg capsule, Take 2 mg by mouth 4 (four) times a day as needed for diarrhea, Disp: , Rfl:     metoprolol succinate (TOPROL-XL) 100 mg 24 hr tablet, Take 1 tablet (100 mg total) by mouth daily for 7 days, Disp: 90 tablet, Rfl: 0    multivitamin (THERAGRAN) TABS, Take 1 tablet by mouth daily  , Disp: , Rfl:     naloxone (NARCAN) 4 mg/0 1 mL nasal spray, Administer 1 spray into a nostril   If no response after 2-3 minutes, give another dose in the other nostril using a new spray , Disp: 1 each, Rfl: 1    NovoLOG FlexPen 100 units/mL injection pen, 10 units with each meal  (Patient taking differently: Inject 10 Units under the skin 3 (three) times a day with meals ), Disp: 5 pen, Rfl: 1    omeprazole (PriLOSEC) 20 mg delayed release capsule, Take 20 mg by mouth daily in the early morning PRN , Disp: , Rfl:     oxybutynin (DITROPAN-XL) 10 MG 24 hr tablet, Take 1 tablet (10 mg total) by mouth daily, Disp: 30 tablet, Rfl: 0    polyethylene glycol (MIRALAX) 17 g packet, Take 17 g by mouth daily, Disp: 30 each, Rfl: 0    senna (SENOKOT) 8 6 MG tablet, Take 1 tablet (8 6 mg total) by mouth daily, Disp: 30 tablet, Rfl: 0    sertraline (ZOLOFT) 50 mg tablet, Take 1 tablet (50 mg total) by mouth daily, Disp: 90 tablet, Rfl: 0    sodium chloride, PF, 0 9 %, 10 mL by Intracatheter route daily Intracatheter flushing daily, Disp: 900 mL, Rfl: 0    sodium chloride, PF, 0 9 %, 10 mL by Intracatheter route daily Intracatheter flushing daily, Disp: 900 mL, Rfl: 0    sodium chloride, PF, 0 9 %, 10 mL by Intracatheter route daily Bilateral PCNs to be flushed daily with prefilled 10cc NS, Disp: 600 mL, Rfl: 3    tamsulosin (FLOMAX) 0 4 mg, Take 1 capsule (0 4 mg total) by mouth daily at bedtime, Disp: 90 capsule, Rfl: 0  Allergies   Allergen Reactions    Atorvastatin Hives, Itching and Rash    Simvastatin Rash and Edema     Edema of lower legs    Statins Hives and Itching    Insulin Lispro Swelling and Edema     " Lower Legs"    Other Itching, Rash and Other (See Comments)     "EKG Patches"   "blue EKG patches"         OBJECTIVE:   RESULTS  Lab Results:   Recent Results (from the past 672 hour(s))   Urinalysis with microscopic    Collection Time: 04/05/22 12:17 PM   Result Value Ref Range    Color, UA Dark Brown     Clarity, UA Extra Turbid     Specific Errol, UA 1 013 1 003 - 1 030    pH, UA 6 5 4 5, 5 0, 5 5, 6 0, 6 5, 7 0, 7 5, 8 0    Leukocytes, UA Large (A) Negative    Nitrite, UA Negative Negative    Protein,  (3+) (A) Negative mg/dl    Glucose, UA Negative Negative mg/dl    Ketones, UA Negative Negative mg/dl    Urobilinogen, UA <2 0 <2 0 mg/dl mg/dl    Bilirubin, UA Negative Negative    Blood, UA Large (A) Negative    RBC, UA 30-50 (A) None Seen, 1-2 /hpf    WBC, UA Innumerable (A) None Seen, 1-2 /hpf    Epithelial Cells None Seen None Seen, Occasional /hpf    Bacteria, UA None Seen None Seen, Occasional /hpf    WBC Clumps PRESENT    Urine culture    Collection Time: 04/05/22 12:17 PM    Specimen: Urine   Result Value Ref Range    Urine Culture 60,000-69,000 cfu/ml Serratia marcescens (A)     Urine Culture <10,000 cfu/ml         Susceptibility    Serratia marcescens - ERIC     ZID Performed Yes       Amoxicillin + Clavulanate >16/8 Resistant ug/ml     Ampicillin ($$) 16 00 Resistant ug/ml     Ampicillin + Sulbactam ($) 16/8 Resistant ug/ml     Aztreonam ($$$)  <=4 Susceptible ug/ml     Cefazolin ($) >16 00 Resistant ug/ml     Ceftazidime ($$) <=1 Susceptible ug/ml     Ceftriaxone ($$) <=1 00 Susceptible ug/ml     Cefuroxime ($$) >16 Resistant ug/ml     Ciprofloxacin ($)  <=0 25 Susceptible ug/ml     Ertapenem ($$$) <=0 5 Susceptible ug/ml     Gentamicin ($$) <=2 Susceptible ug/ml     Levofloxacin ($) <=0 50 Susceptible ug/ml     Minocycline <=4 Susceptible ug/ml     Nitrofurantoin >64 Resistant ug/ml     Piperacillin + Tazobactam ($$$) <=8 Susceptible ug/ml     Tetracycline >8 Resistant ug/ml     Tobramycin ($) <=2 Susceptible ug/ml     Trimethoprim + Sulfamethoxazole ($$$) <=0 5/9 5 Susceptible ug/ml   CBC and differential    Collection Time: 04/06/22 11:36 PM   Result Value Ref Range    WBC 5 61 4 31 - 10 16 Thousand/uL    RBC 2 85 (L) 3 88 - 5 62 Million/uL    Hemoglobin 9 2 (L) 12 0 - 17 0 g/dL    Hematocrit 27 1 (L) 36 5 - 49 3 %    MCV 95 82 - 98 fL    MCH 32 3 26 8 - 34 3 pg    MCHC 33 9 31 4 - 37 4 g/dL    RDW 14 7 11 6 - 15 1 %    MPV 9 9 8 9 - 12 7 fL    Platelets 750 683 - 415 Thousands/uL    nRBC 0 /100 WBCs    Neutrophils Relative 64 43 - 75 %    Immat GRANS % 0 0 - 2 %    Lymphocytes Relative 25 14 - 44 %    Monocytes Relative 9 4 - 12 %    Eosinophils Relative 2 0 - 6 %    Basophils Relative 0 0 - 1 %    Neutrophils Absolute 3 55 1 85 - 7 62 Thousands/µL    Immature Grans Absolute 0 02 0 00 - 0 20 Thousand/uL    Lymphocytes Absolute 1 40 0 60 - 4 47 Thousands/µL    Monocytes Absolute 0 50 0 17 - 1 22 Thousand/µL    Eosinophils Absolute 0 12 0 00 - 0 61 Thousand/µL    Basophils Absolute 0 02 0 00 - 0 10 Thousands/µL   Protime-INR    Collection Time: 04/06/22 11:36 PM   Result Value Ref Range    Protime 13 6 11 6 - 14 5 seconds    INR 1 07 0 84 - 1 19   APTT    Collection Time: 04/06/22 11:36 PM   Result Value Ref Range    PTT 30 23 - 37 seconds   Type and screen    Collection Time: 04/06/22 11:36 PM   Result Value Ref Range    ABO Grouping O     Rh Factor Positive     Antibody Screen Negative     Specimen Expiration Date [id number]    Comprehensive metabolic panel    Collection Time: 04/06/22 11:36 PM   Result Value Ref Range    Sodium 136 136 - 145 mmol/L    Potassium 4 4 3 5 - 5 3 mmol/L    Chloride 104 100 - 108 mmol/L    CO2 24 21 - 32 mmol/L    ANION GAP 8 4 - 13 mmol/L    BUN 47 (H) 5 - 25 mg/dL Creatinine 3 28 (H) 0 60 - 1 30 mg/dL    Glucose 134 65 - 140 mg/dL    Calcium 8 7 8 3 - 10 1 mg/dL    Corrected Calcium 9 5 8 3 - 10 1 mg/dL    AST 40 5 - 45 U/L    ALT 62 12 - 78 U/L    Alkaline Phosphatase 138 (H) 46 - 116 U/L    Total Protein 6 8 6 4 - 8 2 g/dL    Albumin 3 0 (L) 3 5 - 5 0 g/dL    Total Bilirubin 0 29 0 20 - 1 00 mg/dL    eGFR 16 ml/min/1 73sq m   Lipase    Collection Time: 04/06/22 11:36 PM   Result Value Ref Range    Lipase 106 73 - 393 u/L   UA w Reflex to Microscopic w Reflex to Culture    Collection Time: 04/07/22 12:57 AM    Specimen: Urine, Clean Catch   Result Value Ref Range    Color, UA Red     Clarity, UA Cloudy     Specific [place], UA 1 020 1 003 - 1 030    pH, UA 6 5 4 5, 5 0, 5 5, 6 0, 6 5, 7 0, 7 5, 8 0    Leukocytes, UA Large (A) Negative    Nitrite, UA Positive (A) Negative    Protein, UA >=300 (A) Negative mg/dl    Glucose, UA Negative Negative mg/dl    Ketones, UA Trace (A) Negative mg/dl    Urobilinogen, UA 1 0 0 2, 1 0 E U /dl E U /dl    Bilirubin, UA Interference- unable to analyze (A) Negative    Blood, UA Large (A) Negative   Urine Microscopic    Collection Time: 04/07/22 12:57 AM   Result Value Ref Range    RBC, UA Innumerable (A) None Seen, 2-4 /hpf    WBC, UA Innumerable (A) None Seen, 2-4, 5-60 /hpf    Epithelial Cells Occasional None Seen, Occasional /hpf    Bacteria, UA Occasional None Seen, Occasional /hpf   Urine culture    Collection Time: 04/07/22 12:57 AM    Specimen: Urine, Clean Catch   Result Value Ref Range    Urine Culture 40,000-49,000 cfu/ml Serratia marcescens (A)     Urine Culture 10,000-19,000 cfu/ml Citrobacter freundii (A)        Susceptibility    Citrobacter freundii - ERIC     ZID Performed Yes       Amoxicillin + Clavulanate >16/8 Resistant ug/ml     Ampicillin ($$) 16 00 Resistant ug/ml     Ampicillin + Sulbactam ($) 16/8 Resistant ug/ml     Aztreonam ($$$)  <=4 Susceptible ug/ml     Cefazolin ($) >16 00 Resistant ug/ml     Ceftazidime ($$) <=1 Susceptible ug/ml     Ceftriaxone ($$) <=1 00 Susceptible ug/ml     Cefuroxime ($$) >16 Resistant ug/ml     Ciprofloxacin ($)  <=0 25 Susceptible ug/ml     Ertapenem ($$$) <=0 5 Susceptible ug/ml     Gentamicin ($$) <=2 Susceptible ug/ml     Levofloxacin ($) <=0 50 Susceptible ug/ml     Minocycline <=4 Susceptible ug/ml     Nitrofurantoin >64 Resistant ug/ml     Piperacillin + Tazobactam ($$$) <=8 Susceptible ug/ml     Tetracycline >8 Resistant ug/ml     Tobramycin ($) <=2 Susceptible ug/ml     Trimethoprim + Sulfamethoxazole ($$$) <=0 5/9 5 Susceptible ug/ml    Serratia marcescens - ERIC     ZID Performed Yes       Amoxicillin + Clavulanate 16/8 Resistant ug/ml     Ampicillin ($$) <=8 00 Resistant ug/ml     Ampicillin + Sulbactam ($) <=4/2 Resistant ug/ml     Aztreonam ($$$)  <=4 Susceptible ug/ml     Cefazolin ($) >16 00 Resistant ug/ml     Ceftazidime ($$) <=1 Susceptible ug/ml     Ceftriaxone ($$) <=1 00 Susceptible ug/ml     Cefuroxime ($$) <=4 Resistant ug/ml     Ciprofloxacin ($)  <=0 25 Susceptible ug/ml     Ertapenem ($$$) <=0 5 Susceptible ug/ml     Gentamicin ($$) <=2 Susceptible ug/ml     Levofloxacin ($) <=0 50 Susceptible ug/ml     Nitrofurantoin <=32 Resistant ug/ml     Piperacillin + Tazobactam ($$$) <=8 Susceptible ug/ml     Tetracycline <=4 Susceptible ug/ml     Tobramycin ($) <=2 Susceptible ug/ml     Trimethoprim + Sulfamethoxazole ($$$) <=0 5/9 5 Susceptible ug/ml   Fingerstick Glucose (POCT)    Collection Time: 04/07/22  5:52 AM   Result Value Ref Range    POC Glucose 74 65 - 140 mg/dl   Fingerstick Glucose (POCT)    Collection Time: 04/07/22  7:46 AM   Result Value Ref Range    POC Glucose 103 65 - 140 mg/dl   Basic metabolic panel    Collection Time: 04/07/22  9:30 AM   Result Value Ref Range    Sodium 137 136 - 145 mmol/L    Potassium 4 5 3 5 - 5 3 mmol/L    Chloride 104 100 - 108 mmol/L    CO2 25 21 - 32 mmol/L    ANION GAP 8 4 - 13 mmol/L    BUN 44 (H) 5 - 25 mg/dL    Creatinine 3 12 (H) 0 60 - 1 30 mg/dL    Glucose 92 65 - 140 mg/dL    Calcium 8 7 8 3 - 10 1 mg/dL    eGFR 18 ml/min/1 73sq m   Fingerstick Glucose (POCT)    Collection Time: 04/07/22 11:54 AM   Result Value Ref Range    POC Glucose 98 65 - 140 mg/dl   Fingerstick Glucose (POCT)    Collection Time: 04/07/22  3:53 PM   Result Value Ref Range    POC Glucose 85 65 - 140 mg/dl   Hemoglobin and hematocrit, blood    Collection Time: 04/07/22  4:15 PM   Result Value Ref Range    Hemoglobin 9 7 (L) 12 0 - 17 0 g/dL    Hematocrit 28 8 (L) 36 5 - 49 3 %   Fingerstick Glucose (POCT)    Collection Time: 04/07/22  9:25 PM   Result Value Ref Range    POC Glucose 210 (H) 65 - 140 mg/dl   Fingerstick Glucose (POCT)    Collection Time: 04/08/22 12:50 AM   Result Value Ref Range    POC Glucose 250 (H) 65 - 140 mg/dl   Fingerstick Glucose (POCT)    Collection Time: 04/08/22  5:57 AM   Result Value Ref Range    POC Glucose 178 (H) 65 - 140 mg/dl   Basic metabolic panel    Collection Time: 04/08/22  9:32 AM   Result Value Ref Range    Sodium 137 136 - 145 mmol/L    Potassium 4 6 3 5 - 5 3 mmol/L    Chloride 104 100 - 108 mmol/L    CO2 24 21 - 32 mmol/L    ANION GAP 9 4 - 13 mmol/L    BUN 39 (H) 5 - 25 mg/dL    Creatinine 2 82 (H) 0 60 - 1 30 mg/dL    Glucose 129 65 - 140 mg/dL    Calcium 8 7 8 3 - 10 1 mg/dL    eGFR 20 ml/min/1 73sq m   CBC and differential    Collection Time: 04/08/22 10:30 AM   Result Value Ref Range    WBC 7 85 4 31 - 10 16 Thousand/uL    RBC 2 78 (L) 3 88 - 5 62 Million/uL    Hemoglobin 9 0 (L) 12 0 - 17 0 g/dL    Hematocrit 26 3 (L) 36 5 - 49 3 %    MCV 95 82 - 98 fL    MCH 32 4 26 8 - 34 3 pg    MCHC 34 2 31 4 - 37 4 g/dL    RDW 14 6 11 6 - 15 1 %    MPV 9 8 8 9 - 12 7 fL    Platelets 807 666 - 507 Thousands/uL    nRBC 0 /100 WBCs    Neutrophils Relative 76 (H) 43 - 75 %    Immat GRANS % 0 0 - 2 %    Lymphocytes Relative 14 14 - 44 %    Monocytes Relative 9 4 - 12 %    Eosinophils Relative 1 0 - 6 %    Basophils Relative 0 0 - 1 % Neutrophils Absolute 5 85 1 85 - 7 62 Thousands/µL    Immature Grans Absolute 0 02 0 00 - 0 20 Thousand/uL    Lymphocytes Absolute 1 13 0 60 - 4 47 Thousands/µL    Monocytes Absolute 0 72 0 17 - 1 22 Thousand/µL    Eosinophils Absolute 0 11 0 00 - 0 61 Thousand/µL    Basophils Absolute 0 02 0 00 - 0 10 Thousands/µL   Fingerstick Glucose (POCT)    Collection Time: 04/08/22 11:29 AM   Result Value Ref Range    POC Glucose 132 65 - 140 mg/dl   Fingerstick Glucose (POCT)    Collection Time: 04/08/22  4:24 PM   Result Value Ref Range    POC Glucose 172 (H) 65 - 140 mg/dl   Fingerstick Glucose (POCT)    Collection Time: 04/08/22  8:51 PM   Result Value Ref Range    POC Glucose 115 65 - 140 mg/dl   Fingerstick Glucose (POCT)    Collection Time: 04/08/22 11:57 PM   Result Value Ref Range    POC Glucose 107 65 - 140 mg/dl   Fingerstick Glucose (POCT)    Collection Time: 04/09/22  6:06 AM   Result Value Ref Range    POC Glucose 144 (H) 65 - 140 mg/dl   Comprehensive metabolic panel    Collection Time: 04/09/22  6:58 AM   Result Value Ref Range    Sodium 139 136 - 145 mmol/L    Potassium 4 1 3 5 - 5 3 mmol/L    Chloride 106 100 - 108 mmol/L    CO2 23 21 - 32 mmol/L    ANION GAP 10 4 - 13 mmol/L    BUN 44 (H) 5 - 25 mg/dL    Creatinine 2 76 (H) 0 60 - 1 30 mg/dL    Glucose 137 65 - 140 mg/dL    Calcium 8 3 8 3 - 10 1 mg/dL    Corrected Calcium 9 5 8 3 - 10 1 mg/dL    AST 23 5 - 45 U/L    ALT 40 12 - 78 U/L    Alkaline Phosphatase 114 46 - 116 U/L    Total Protein 6 0 (L) 6 4 - 8 2 g/dL    Albumin 2 5 (L) 3 5 - 5 0 g/dL    Total Bilirubin 0 26 0 20 - 1 00 mg/dL    eGFR 20 ml/min/1 73sq m   CBC and differential    Collection Time: 04/09/22  9:58 AM   Result Value Ref Range    WBC 8 48 4 31 - 10 16 Thousand/uL    RBC 2 84 (L) 3 88 - 5 62 Million/uL    Hemoglobin 9 0 (L) 12 0 - 17 0 g/dL    Hematocrit 27 3 (L) 36 5 - 49 3 %    MCV 96 82 - 98 fL    MCH 31 7 26 8 - 34 3 pg    MCHC 33 0 31 4 - 37 4 g/dL    RDW 14 8 11 6 - 15 1 % MPV 9 9 8 9 - 12 7 fL    Platelets 772 513 - 646 Thousands/uL    nRBC 0 /100 WBCs    Neutrophils Relative 73 43 - 75 %    Immat GRANS % 1 0 - 2 %    Lymphocytes Relative 16 14 - 44 %    Monocytes Relative 9 4 - 12 %    Eosinophils Relative 1 0 - 6 %    Basophils Relative 0 0 - 1 %    Neutrophils Absolute 6 28 1 85 - 7 62 Thousands/µL    Immature Grans Absolute 0 04 0 00 - 0 20 Thousand/uL    Lymphocytes Absolute 1 33 0 60 - 4 47 Thousands/µL    Monocytes Absolute 0 72 0 17 - 1 22 Thousand/µL    Eosinophils Absolute 0 09 0 00 - 0 61 Thousand/µL    Basophils Absolute 0 02 0 00 - 0 10 Thousands/µL   Fingerstick Glucose (POCT)    Collection Time: 04/09/22 11:37 AM   Result Value Ref Range    POC Glucose 172 (H) 65 - 140 mg/dl   Fingerstick Glucose (POCT)    Collection Time: 04/09/22  3:43 PM   Result Value Ref Range    POC Glucose 175 (H) 65 - 140 mg/dl   Fingerstick Glucose (POCT)    Collection Time: 04/09/22  9:10 PM   Result Value Ref Range    POC Glucose 181 (H) 65 - 140 mg/dl   Comprehensive metabolic panel    Collection Time: 04/10/22  6:34 AM   Result Value Ref Range    Sodium 138 136 - 145 mmol/L    Potassium 4 0 3 5 - 5 3 mmol/L    Chloride 104 100 - 108 mmol/L    CO2 24 21 - 32 mmol/L    ANION GAP 10 4 - 13 mmol/L    BUN 45 (H) 5 - 25 mg/dL    Creatinine 2 96 (H) 0 60 - 1 30 mg/dL    Glucose 120 65 - 140 mg/dL    Calcium 8 3 8 3 - 10 1 mg/dL    Corrected Calcium 9 4 8 3 - 10 1 mg/dL    AST 20 5 - 45 U/L    ALT 38 12 - 78 U/L    Alkaline Phosphatase 116 46 - 116 U/L    Total Protein 6 4 6 4 - 8 2 g/dL    Albumin 2 6 (L) 3 5 - 5 0 g/dL    Total Bilirubin 0 23 0 20 - 1 00 mg/dL    eGFR 19 ml/min/1 73sq m   CBC    Collection Time: 04/10/22  6:34 AM   Result Value Ref Range    WBC 6 15 4 31 - 10 16 Thousand/uL    RBC 2 61 (L) 3 88 - 5 62 Million/uL    Hemoglobin 8 6 (L) 12 0 - 17 0 g/dL    Hematocrit 25 2 (L) 36 5 - 49 3 %    MCV 97 82 - 98 fL    MCH 33 0 26 8 - 34 3 pg    MCHC 34 1 31 4 - 37 4 g/dL    RDW 14 8 11 6 - 15 1 %    Platelets 858 (L) 867 - 390 Thousands/uL    MPV 9 9 8 9 - 12 7 fL   Fingerstick Glucose (POCT)    Collection Time: 04/10/22  6:35 AM   Result Value Ref Range    POC Glucose 141 (H) 65 - 140 mg/dl   Fingerstick Glucose (POCT)    Collection Time: 04/10/22 11:17 AM   Result Value Ref Range    POC Glucose 199 (H) 65 - 140 mg/dl   Fingerstick Glucose (POCT)    Collection Time: 04/10/22  3:36 PM   Result Value Ref Range    POC Glucose 259 (H) 65 - 140 mg/dl   Fingerstick Glucose (POCT)    Collection Time: 04/10/22  8:34 PM   Result Value Ref Range    POC Glucose 169 (H) 65 - 140 mg/dl   CBC    Collection Time: 04/11/22  5:50 AM   Result Value Ref Range    WBC 6 03 4 31 - 10 16 Thousand/uL    RBC 2 66 (L) 3 88 - 5 62 Million/uL    Hemoglobin 8 6 (L) 12 0 - 17 0 g/dL    Hematocrit 24 5 (L) 36 5 - 49 3 %    MCV 92 82 - 98 fL    MCH 32 3 26 8 - 34 3 pg    MCHC 35 1 31 4 - 37 4 g/dL    RDW 14 6 11 6 - 15 1 %    Platelets 852 789 - 814 Thousands/uL    MPV 10 6 8 9 - 12 7 fL   Comprehensive metabolic panel    Collection Time: 04/11/22  5:50 AM   Result Value Ref Range    Sodium 138 136 - 145 mmol/L    Potassium 4 2 3 5 - 5 3 mmol/L    Chloride 105 100 - 108 mmol/L    CO2 24 21 - 32 mmol/L    ANION GAP 9 4 - 13 mmol/L    BUN 48 (H) 5 - 25 mg/dL    Creatinine 2 71 (H) 0 60 - 1 30 mg/dL    Glucose 101 65 - 140 mg/dL    Calcium 8 6 8 3 - 10 1 mg/dL    Corrected Calcium 9 7 8 3 - 10 1 mg/dL    AST 22 5 - 45 U/L    ALT 34 12 - 78 U/L    Alkaline Phosphatase 112 46 - 116 U/L    Total Protein 6 5 6 4 - 8 2 g/dL    Albumin 2 6 (L) 3 5 - 5 0 g/dL    Total Bilirubin 0 24 0 20 - 1 00 mg/dL    eGFR 21 ml/min/1 73sq m   Fingerstick Glucose (POCT)    Collection Time: 04/11/22  6:11 AM   Result Value Ref Range    POC Glucose 105 65 - 140 mg/dl   Fingerstick Glucose (POCT)    Collection Time: 04/11/22 11:26 AM   Result Value Ref Range    POC Glucose 145 (H) 65 - 140 mg/dl   Fingerstick Glucose (POCT)    Collection Time: 04/11/22 4:34 PM   Result Value Ref Range    POC Glucose 157 (H) 65 - 140 mg/dl   Fingerstick Glucose (POCT)    Collection Time: 04/11/22  9:02 PM   Result Value Ref Range    POC Glucose 214 (H) 65 - 140 mg/dl   Basic metabolic panel    Collection Time: 04/12/22  5:09 AM   Result Value Ref Range    Sodium 137 136 - 145 mmol/L    Potassium 4 4 3 5 - 5 3 mmol/L    Chloride 104 100 - 108 mmol/L    CO2 23 21 - 32 mmol/L    ANION GAP 10 4 - 13 mmol/L    BUN 52 (H) 5 - 25 mg/dL    Creatinine 2 93 (H) 0 60 - 1 30 mg/dL    Glucose 184 (H) 65 - 140 mg/dL    Calcium 8 7 8 3 - 10 1 mg/dL    eGFR 19 ml/min/1 73sq m   CBC    Collection Time: 04/12/22  5:09 AM   Result Value Ref Range    WBC 5 51 4 31 - 10 16 Thousand/uL    RBC 2 77 (L) 3 88 - 5 62 Million/uL    Hemoglobin 8 8 (L) 12 0 - 17 0 g/dL    Hematocrit 26 3 (L) 36 5 - 49 3 %    MCV 95 82 - 98 fL    MCH 31 8 26 8 - 34 3 pg    MCHC 33 5 31 4 - 37 4 g/dL    RDW 14 8 11 6 - 15 1 %    Platelets 952 490 - 479 Thousands/uL    MPV 9 8 8 9 - 12 7 fL   Fingerstick Glucose (POCT)    Collection Time: 04/12/22  6:24 AM   Result Value Ref Range    POC Glucose 184 (H) 65 - 140 mg/dl   Fingerstick Glucose (POCT)    Collection Time: 04/12/22  1:58 PM   Result Value Ref Range    POC Glucose 167 (H) 65 - 140 mg/dl   Tissue Exam    Collection Time: 04/12/22  4:46 PM   Result Value Ref Range    Case Report       Surgical Pathology Report                         Case: [id number]                                   Authorizing Provider:  Becky Osuna MD           Collected:           04/12/2022 1646              Ordering Location:     [facility]        Received:            04/12/2022 [address] [place] [address] Operating Room                                                     Pathologist:           Pb Salguero MD Specimens:   A) - Urinary Bladder, selected bladder biopsies                                                     B) - Urethra, prostate urethra                                                             Final Diagnosis       A  Urinary Bladder, selected bladder biopsies:  - Focally intact urothelial mucosa with associated acutely inflamed granulation tissue  See comment  B  Prostatic urethra:  - Focally intact urothelial mucosa with associated acutely inflamed granulation tissue  See comment  Comment: Immunohistochemistry for AE1/3 is negative for an infiltrative pattern  Clinical history notable for status post chemotherapy and urine culture positive for Serratia marcescens  The findings are compatible with infectious cystitis  Additional Information       All reported additional testing was performed with appropriately reactive controls  These tests were developed and their performance characteristics determined by [facility] or appropriate performing facility, though some tests may be performed on tissues which have not been validated for performance characteristics (such as staining performed on alcohol exposed cell blocks and decalcified tissues)  Results should be interpreted with caution and in the context of the patients clinical condition  These tests may not be cleared or approved by the [place]  Food and Drug Administration, though the FDA has determined that such clearance or approval is not necessary  These tests are used for clinical purposes and they should not be regarded as investigational or for research  This laboratory has been approved by CLIA 88, designated as a high-complexity laboratory and is qualified to perform these tests     Interpretation performed at [facility], [address] [place] [address]      Gross Description          A   The specimen is received in formalin, labeled with the patient's name and hospital number, and is designated "selected bladder biopsies"  The specimen consists of 3 tan-pink tissue fragments measuring 0 1-0 2 cm in greatest dimension  The specimen is entirely submitted in a screened cassette  B  The specimen is received in formalin, labeled with the patient's name and hospital number, and is designated "prostate urethra"  The specimen consists of 3 tan tissue fragments measuring less than 0 1-0 2 cm in greatest dimension  Note: due to the size and consistency of specimen, the tissue may not survive histologic processing  The specimen is entirely submitted in a screened cassette  Note: The estimated total formalin fixation time based upon information provided by the submitting clinician and the standard processing schedule is under 72 hours    Darnell Carrasco              Fingerstick Glucose (POCT)    Collection Time: 04/12/22  5:12 PM   Result Value Ref Range    POC Glucose 171 (H) 65 - 140 mg/dl   Fingerstick Glucose (POCT)    Collection Time: 04/12/22  8:31 PM   Result Value Ref Range    POC Glucose 374 (H) 65 - 140 mg/dl   Fingerstick Glucose (POCT)    Collection Time: 04/13/22  6:13 AM   Result Value Ref Range    POC Glucose 127 65 - 140 mg/dl   Fingerstick Glucose (POCT)    Collection Time: 04/13/22 11:10 AM   Result Value Ref Range    POC Glucose 269 (H) 65 - 140 mg/dl   Fingerstick Glucose (POCT)    Collection Time: 04/13/22  4:15 PM   Result Value Ref Range    POC Glucose 121 65 - 140 mg/dl   Fingerstick Glucose (POCT)    Collection Time: 04/13/22  8:45 PM   Result Value Ref Range    POC Glucose 106 65 - 140 mg/dl   Fingerstick Glucose (POCT)    Collection Time: 04/14/22  8:03 AM   Result Value Ref Range    POC Glucose 305 (H) 65 - 140 mg/dl   Fingerstick Glucose (POCT)    Collection Time: 04/28/22 10:31 AM   Result Value Ref Range    POC Glucose 125 65 - 140 mg/dl     Imaging Studies:CT abdomen pelvis wo contrast    Result Date: 4/7/2022  Narrative: CT ABDOMEN AND PELVIS WITHOUT IV CONTRAST INDICATION:   Abdominal pain, acute, nonlocalized Hematuria, lower abdominal pain  COMPARISON:  CT abdomen and pelvis on 2/23/2022  TECHNIQUE:  CT examination of the abdomen and pelvis was performed without intravenous contrast   Axial, sagittal, and coronal 2D reformatted images were created from the source data and submitted for interpretation  Radiation dose length product (DLP) for this visit:  571 mGy-cm   This examination, like all CT scans performed in the [facility], was performed utilizing techniques to minimize radiation dose exposure, including the use of iterative reconstruction and automated exposure control  Enteric contrast was not administered  FINDINGS: ABDOMEN LOWER CHEST:  No clinically significant abnormality identified in the visualized lower chest  LIVER/BILIARY TREE:  Unremarkable  GALLBLADDER:  Gallbladder is surgically absent  SPLEEN:  Unremarkable  PANCREAS:  Unremarkable  ADRENAL GLANDS:  Unremarkable  KIDNEYS/URETERS:  Previously noted right-sided ureteral stent has been removed  Interval placement of bilateral percutaneous nephrostomy tubes in place  Tiny locules of air in the right upper renal pole and to lesser extent left upper renal pole presumably from recent instrumentation  Mild fullness of the left renal collecting system  No hydronephrosis  Bilateral renal cysts  STOMACH AND BOWEL:  Status post Shaji-en-Y gastric bypass  Colonic diverticulosis without evidence of acute diverticulitis  APPENDIX:  A normal appendix was visualized  ABDOMINOPELVIC CAVITY:  Mild presacral edema  No pneumoperitoneum  No lymphadenopathy  VESSELS:  Atherosclerotic changes are present  No evidence of aneurysm  PELVIS REPRODUCTIVE ORGANS:  The prostate is enlarged  URINARY BLADDER:  Persistent diffuse wall thickening with surrounding fat stranding, not significantly changed   ABDOMINAL WALL/INGUINAL REGIONS:  Foci of stranding in the anterior abdominal wall likely secondary to subcutaneous injections  OSSEOUS STRUCTURES:  No acute fracture or destructive osseous lesion  Spinal degenerative changes are noted  Impression: 1  Interval placement of bilateral percutaneous nephrostomy tubes in place  No hydronephrosis  2   Persistent diffuse wall thickening of the urinary bladder with surrounding fat stranding likely representing known malignancy with cystitis  Workstation performed: [id number]     CT chest wo contrast    Result Date: 4/28/2022  Narrative: CT CHEST WITHOUT IV CONTRAST INDICATION:   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  COMPARISON:  November 11, 2021 TECHNIQUE: CT examination of the chest was performed without intravenous contrast   Axial, sagittal, and coronal 2D reformatted images were created from the source data and submitted for interpretation  Radiation dose length product (DLP) for this visit:  433 mGy-cm   This examination, like all [facility] scans [facility], was performed utilizing techniques to minimize radiation dose exposure, including the use of iterative reconstruction and automated exposure control  FINDINGS: LUNGS:  Stable 3 mm right middle lobe pulmonary nodule (series 3 image 88)  There is no tracheal or endobronchial lesion  PLEURA:  Unremarkable  HEART/GREAT VESSELS: Heavy atherosclerotic coronary artery calcification is noted  Heart is otherwise unremarkable  No thoracic aortic aneurysm  MEDIASTINUM AND DIMAS:  Unremarkable  CHEST WALL AND LOWER NECK:  Status post right chest port insertion  VISUALIZED STRUCTURES IN THE UPPER ABDOMEN:  Postoperative changes from gastric bypass surgery  Small focus of air in the left renal collecting system possibly related to recent instrumentation  Correlate clinically  OSSEOUS STRUCTURES:  No acute fracture or destructive osseous lesion  Postoperative changes in the right shoulder and lower cervical spine  Impression: Stable 3 mm right middle lobe pulmonary nodule  Small amount of gas in the left renal collecting system  Clinical correlation for recent instrumentation  Postoperative changes as above  Workstation performed: [id number]     IR nephrostomy tube check/change/reposition/reinsertion/upsize    Result Date: 4/28/2022  Narrative: Right nephrostomy tube exchange Left nephrostomy tube exchange Bilateral antegrade nephrostograms History: Bilateral nephrostomy tubes placed 2/25/2022 Contrast type: omnipaque 300   Contrast dose: 12 cc Fluoroscopy time: 1 3 minutes Technique: Procedure performed under continuity of care  The patient was identified verbally, and by wrist band " Time out" was performed  Each existing tube was prepped and draped in usual sterile fashion  Lidocaine was given as local anesthesia  Initially began with the left side  Contrast was injected  The left collecting system was opacified  Antegrade nephrostogram was performed down to the distal ureter  The existing tube was transected and removed over an Amplatz wire  A new 10 German tube was placed  We now repeated the process on the right side with antegrade nephrostogram followed by tube removal and placement of a new 10 German right nephrostomy  Findings: Bilateral existing nephrostomy tubes in place Antegrade left nephrostogram reveals obstruction of the distal ureter  Antegrade nephrostogram in the right reveals that the distal right ureter is patent  New 10 German tubes in each renal pelvis  Impression: Impression: Image guided exchange of bilateral 10 German nephrostomy tubes  The distal left ureter remains obstructed  The distal right ureter is patent and drains into the bladder  Workstation performed: Emile Abdi MD  5/2/2022,2:41 PM    VIRTUAL VISIT DISCLAIMER  Patient verbally agrees to participate in [employer]   Pt is aware that [employer] could be limited without vital signs or the ability to perform a full hands-on physical exam  Patient understands he or the provider may request at any time to terminate the video visit and request the patient to seek care or treatment in person  Portions of the record may have been created with voice recognition software  Occasional wrong word or "sound a like" substitutions may have occurred due to the inherent limitations of voice recognition software  Read the chart carefully and recognize, using context, where substitutions have occurred

## 2022-05-03 ENCOUNTER — PATIENT OUTREACH (OUTPATIENT)
Dept: HEMATOLOGY ONCOLOGY | Facility: CLINIC | Age: 77
End: 2022-05-03

## 2022-05-03 NOTE — PROGRESS NOTES
See encounter dated 4-22-22: clerical spoke with pt on 4-29 to schedule this appt but pt declined to schedule right now  He wants to speak with his wife about her schedule and then he will call back to make appt for cysto in October

## 2022-05-03 NOTE — PROGRESS NOTES
Per Dr Franca Ayers note (4/28/22), pt will need follow-up cystoscopy with bladder biopsies 6 months from recent cystoscopy (4/12/2022)  Will need to be scheduled for October 2022

## 2022-05-04 ENCOUNTER — APPOINTMENT (EMERGENCY)
Dept: CT IMAGING | Facility: HOSPITAL | Age: 77
End: 2022-05-04
Payer: MEDICARE

## 2022-05-04 ENCOUNTER — APPOINTMENT (EMERGENCY)
Dept: RADIOLOGY | Facility: HOSPITAL | Age: 77
End: 2022-05-04
Payer: MEDICARE

## 2022-05-04 ENCOUNTER — HOSPITAL ENCOUNTER (OUTPATIENT)
Facility: HOSPITAL | Age: 77
Setting detail: OBSERVATION
Discharge: HOME | End: 2022-05-05
Attending: EMERGENCY MEDICINE | Admitting: INTERNAL MEDICINE
Payer: MEDICARE

## 2022-05-04 ENCOUNTER — OFFICE VISIT (OUTPATIENT)
Dept: INTERNAL MEDICINE CLINIC | Facility: CLINIC | Age: 77
End: 2022-05-04
Payer: MEDICARE

## 2022-05-04 VITALS
DIASTOLIC BLOOD PRESSURE: 64 MMHG | HEIGHT: 72 IN | HEART RATE: 80 BPM | TEMPERATURE: 98.4 F | SYSTOLIC BLOOD PRESSURE: 104 MMHG | RESPIRATION RATE: 14 BRPM | WEIGHT: 224 LBS | BODY MASS INDEX: 30.34 KG/M2

## 2022-05-04 DIAGNOSIS — C67.9 BLADDER CARCINOMA (HCC): ICD-10-CM

## 2022-05-04 DIAGNOSIS — M51.36 DEGENERATION OF LUMBAR INTERVERTEBRAL DISC: ICD-10-CM

## 2022-05-04 DIAGNOSIS — I25.118 CORONARY ARTERY DISEASE OF NATIVE ARTERY OF NATIVE HEART WITH STABLE ANGINA PECTORIS (HCC): ICD-10-CM

## 2022-05-04 DIAGNOSIS — E11.22 TYPE 2 DIABETES MELLITUS WITH STAGE 3 CHRONIC KIDNEY DISEASE, WITH LONG-TERM CURRENT USE OF INSULIN, UNSPECIFIED WHETHER STAGE 3A OR 3B CKD (HCC): Primary | ICD-10-CM

## 2022-05-04 DIAGNOSIS — F32.9 REACTIVE DEPRESSION: ICD-10-CM

## 2022-05-04 DIAGNOSIS — M47.816 LUMBAR SPONDYLOSIS: ICD-10-CM

## 2022-05-04 DIAGNOSIS — N18.30 TYPE 2 DIABETES MELLITUS WITH STAGE 3 CHRONIC KIDNEY DISEASE, WITH LONG-TERM CURRENT USE OF INSULIN, UNSPECIFIED WHETHER STAGE 3A OR 3B CKD (HCC): Primary | ICD-10-CM

## 2022-05-04 DIAGNOSIS — F32.A DEPRESSION: ICD-10-CM

## 2022-05-04 DIAGNOSIS — E78.2 MIXED HYPERLIPIDEMIA: ICD-10-CM

## 2022-05-04 DIAGNOSIS — R55 SYNCOPE: Primary | ICD-10-CM

## 2022-05-04 DIAGNOSIS — M54.16 LUMBAR RADICULOPATHY: ICD-10-CM

## 2022-05-04 DIAGNOSIS — E11.42 DIABETIC POLYNEUROPATHY ASSOCIATED WITH TYPE 2 DIABETES MELLITUS (HCC): ICD-10-CM

## 2022-05-04 DIAGNOSIS — N18.4 CKD (CHRONIC KIDNEY DISEASE) STAGE 4, GFR 15-29 ML/MIN (HCC): Chronic | ICD-10-CM

## 2022-05-04 DIAGNOSIS — I73.9 PAD (PERIPHERAL ARTERY DISEASE) (HCC): ICD-10-CM

## 2022-05-04 DIAGNOSIS — Z79.4 TYPE 2 DIABETES MELLITUS WITH STAGE 3 CHRONIC KIDNEY DISEASE, WITH LONG-TERM CURRENT USE OF INSULIN, UNSPECIFIED WHETHER STAGE 3A OR 3B CKD (HCC): Primary | ICD-10-CM

## 2022-05-04 DIAGNOSIS — I70.209 ARTERIOSCLEROSIS OF ARTERY OF EXTREMITY (HCC): ICD-10-CM

## 2022-05-04 DIAGNOSIS — R55 SYNCOPE, UNSPECIFIED SYNCOPE TYPE: ICD-10-CM

## 2022-05-04 PROBLEM — R45.851 DEPRESSION WITH SUICIDAL IDEATION: Status: ACTIVE | Noted: 2022-05-04

## 2022-05-04 LAB
2HR DELTA HS TROPONIN: 0 NG/L
4HR DELTA HS TROPONIN: 1 NG/L
ALBUMIN SERPL BCP-MCNC: 3.1 G/DL (ref 3.5–5)
ALP SERPL-CCNC: 129 U/L (ref 46–116)
ALT SERPL W P-5'-P-CCNC: 84 U/L (ref 12–78)
ANION GAP SERPL CALCULATED.3IONS-SCNC: 8 MMOL/L (ref 4–13)
APTT PPP: 33 SECONDS (ref 23–37)
AST SERPL W P-5'-P-CCNC: 63 U/L (ref 5–45)
ATRIAL RATE: 53 BPM
ATRIAL RATE: 53 BPM
ATRIAL RATE: 58 BPM
BASOPHILS # BLD AUTO: 0.04 THOUSANDS/ΜL (ref 0–0.1)
BASOPHILS NFR BLD AUTO: 1 % (ref 0–1)
BILIRUB SERPL-MCNC: 0.3 MG/DL (ref 0.2–1)
BUN SERPL-MCNC: 45 MG/DL (ref 5–25)
CALCIUM ALBUM COR SERPL-MCNC: 9.5 MG/DL (ref 8.3–10.1)
CALCIUM SERPL-MCNC: 8.8 MG/DL (ref 8.3–10.1)
CARDIAC TROPONIN I PNL SERPL HS: 10 NG/L
CARDIAC TROPONIN I PNL SERPL HS: 9 NG/L
CARDIAC TROPONIN I PNL SERPL HS: 9 NG/L
CHLORIDE SERPL-SCNC: 106 MMOL/L (ref 100–108)
CO2 SERPL-SCNC: 25 MMOL/L (ref 21–32)
CREAT SERPL-MCNC: 3.22 MG/DL (ref 0.6–1.3)
D DIMER PPP FEU-MCNC: 2.14 UG/ML FEU
EOSINOPHIL # BLD AUTO: 0.25 THOUSAND/ΜL (ref 0–0.61)
EOSINOPHIL NFR BLD AUTO: 4 % (ref 0–6)
ERYTHROCYTE [DISTWIDTH] IN BLOOD BY AUTOMATED COUNT: 13.9 % (ref 11.6–15.1)
GFR SERPL CREATININE-BSD FRML MDRD: 17 ML/MIN/1.73SQ M
GLUCOSE SERPL-MCNC: 96 MG/DL (ref 65–140)
HCT VFR BLD AUTO: 29.2 % (ref 36.5–49.3)
HGB BLD-MCNC: 9.7 G/DL (ref 12–17)
IMM GRANULOCYTES # BLD AUTO: 0.03 THOUSAND/UL (ref 0–0.2)
IMM GRANULOCYTES NFR BLD AUTO: 1 % (ref 0–2)
INR PPP: 1.04 (ref 0.84–1.19)
LYMPHOCYTES # BLD AUTO: 1.54 THOUSANDS/ΜL (ref 0.6–4.47)
LYMPHOCYTES NFR BLD AUTO: 25 % (ref 14–44)
MCH RBC QN AUTO: 32.8 PG (ref 26.8–34.3)
MCHC RBC AUTO-ENTMCNC: 33.2 G/DL (ref 31.4–37.4)
MCV RBC AUTO: 99 FL (ref 82–98)
MONOCYTES # BLD AUTO: 0.63 THOUSAND/ΜL (ref 0.17–1.22)
MONOCYTES NFR BLD AUTO: 10 % (ref 4–12)
NEUTROPHILS # BLD AUTO: 3.69 THOUSANDS/ΜL (ref 1.85–7.62)
NEUTS SEG NFR BLD AUTO: 59 % (ref 43–75)
NRBC BLD AUTO-RTO: 0 /100 WBCS
NT-PROBNP SERPL-MCNC: 1116 PG/ML
P AXIS: 54 DEGREES
P AXIS: 55 DEGREES
P AXIS: 63 DEGREES
PLATELET # BLD AUTO: 161 THOUSANDS/UL (ref 149–390)
PMV BLD AUTO: 11 FL (ref 8.9–12.7)
POTASSIUM SERPL-SCNC: 4.7 MMOL/L (ref 3.5–5.3)
PR INTERVAL: 204 MS
PR INTERVAL: 224 MS
PR INTERVAL: 232 MS
PROT SERPL-MCNC: 7.1 G/DL (ref 6.4–8.2)
PROTHROMBIN TIME: 13.3 SECONDS (ref 11.6–14.5)
QRS AXIS: -3 DEGREES
QRS AXIS: 10 DEGREES
QRS AXIS: 13 DEGREES
QRSD INTERVAL: 104 MS
QRSD INTERVAL: 106 MS
QRSD INTERVAL: 106 MS
QT INTERVAL: 430 MS
QT INTERVAL: 432 MS
QT INTERVAL: 440 MS
QTC INTERVAL: 405 MS
QTC INTERVAL: 412 MS
QTC INTERVAL: 422 MS
RBC # BLD AUTO: 2.96 MILLION/UL (ref 3.88–5.62)
SL AMB POCT GLUCOSE BLD: 139
SL AMB POCT HEMOGLOBIN AIC: 7.2 (ref ?–6.5)
SODIUM SERPL-SCNC: 139 MMOL/L (ref 136–145)
T WAVE AXIS: 0 DEGREES
T WAVE AXIS: 18 DEGREES
T WAVE AXIS: 20 DEGREES
TSH SERPL DL<=0.05 MIU/L-ACNC: 2.25 UIU/ML (ref 0.45–4.5)
VENTRICULAR RATE: 53 BPM
VENTRICULAR RATE: 53 BPM
VENTRICULAR RATE: 58 BPM
WBC # BLD AUTO: 6.18 THOUSAND/UL (ref 4.31–10.16)

## 2022-05-04 PROCEDURE — 93005 ELECTROCARDIOGRAM TRACING: CPT

## 2022-05-04 PROCEDURE — 70450 CT HEAD/BRAIN W/O DYE: CPT

## 2022-05-04 PROCEDURE — 85610 PROTHROMBIN TIME: CPT | Performed by: EMERGENCY MEDICINE

## 2022-05-04 PROCEDURE — 84484 ASSAY OF TROPONIN QUANT: CPT | Performed by: INTERNAL MEDICINE

## 2022-05-04 PROCEDURE — 85730 THROMBOPLASTIN TIME PARTIAL: CPT | Performed by: EMERGENCY MEDICINE

## 2022-05-04 PROCEDURE — 71045 X-RAY EXAM CHEST 1 VIEW: CPT

## 2022-05-04 PROCEDURE — 82948 REAGENT STRIP/BLOOD GLUCOSE: CPT | Performed by: INTERNAL MEDICINE

## 2022-05-04 PROCEDURE — 84443 ASSAY THYROID STIM HORMONE: CPT | Performed by: EMERGENCY MEDICINE

## 2022-05-04 PROCEDURE — 83880 ASSAY OF NATRIURETIC PEPTIDE: CPT | Performed by: EMERGENCY MEDICINE

## 2022-05-04 PROCEDURE — 85379 FIBRIN DEGRADATION QUANT: CPT | Performed by: EMERGENCY MEDICINE

## 2022-05-04 PROCEDURE — 99285 EMERGENCY DEPT VISIT HI MDM: CPT | Performed by: EMERGENCY MEDICINE

## 2022-05-04 PROCEDURE — 93010 ELECTROCARDIOGRAM REPORT: CPT | Performed by: INTERNAL MEDICINE

## 2022-05-04 PROCEDURE — 36415 COLL VENOUS BLD VENIPUNCTURE: CPT | Performed by: EMERGENCY MEDICINE

## 2022-05-04 PROCEDURE — 99220 PR INITIAL OBSERVATION CARE/DAY 70 MINUTES: CPT | Performed by: INTERNAL MEDICINE

## 2022-05-04 PROCEDURE — G0439 PPPS, SUBSEQ VISIT: HCPCS | Performed by: INTERNAL MEDICINE

## 2022-05-04 PROCEDURE — 93010 ELECTROCARDIOGRAM REPORT: CPT

## 2022-05-04 PROCEDURE — 96360 HYDRATION IV INFUSION INIT: CPT

## 2022-05-04 PROCEDURE — 80053 COMPREHEN METABOLIC PANEL: CPT | Performed by: EMERGENCY MEDICINE

## 2022-05-04 PROCEDURE — 83036 HEMOGLOBIN GLYCOSYLATED A1C: CPT | Performed by: INTERNAL MEDICINE

## 2022-05-04 PROCEDURE — 99285 EMERGENCY DEPT VISIT HI MDM: CPT

## 2022-05-04 PROCEDURE — 99203 OFFICE O/P NEW LOW 30 MIN: CPT | Performed by: GENERAL PRACTICE

## 2022-05-04 PROCEDURE — 99214 OFFICE O/P EST MOD 30 MIN: CPT | Performed by: INTERNAL MEDICINE

## 2022-05-04 PROCEDURE — 84484 ASSAY OF TROPONIN QUANT: CPT | Performed by: EMERGENCY MEDICINE

## 2022-05-04 PROCEDURE — 85025 COMPLETE CBC W/AUTO DIFF WBC: CPT | Performed by: EMERGENCY MEDICINE

## 2022-05-04 PROCEDURE — G1004 CDSM NDSC: HCPCS

## 2022-05-04 RX ORDER — EZETIMIBE 10 MG/1
10 TABLET ORAL
Status: DISCONTINUED | OUTPATIENT
Start: 2022-05-05 | End: 2022-05-05 | Stop reason: HOSPADM

## 2022-05-04 RX ORDER — ARIPIPRAZOLE 2 MG/1
2 TABLET ORAL DAILY
Status: DISCONTINUED | OUTPATIENT
Start: 2022-05-05 | End: 2022-05-05 | Stop reason: HOSPADM

## 2022-05-04 RX ORDER — SERTRALINE HYDROCHLORIDE 100 MG/1
100 TABLET, FILM COATED ORAL DAILY
Qty: 90 TABLET | Refills: 2 | Status: SHIPPED | OUTPATIENT
Start: 2022-05-04 | End: 2022-05-05 | Stop reason: HOSPADM

## 2022-05-04 RX ORDER — DOCUSATE SODIUM 100 MG/1
100 CAPSULE, LIQUID FILLED ORAL 2 TIMES DAILY
Status: DISCONTINUED | OUTPATIENT
Start: 2022-05-04 | End: 2022-05-05 | Stop reason: HOSPADM

## 2022-05-04 RX ORDER — HEPARIN SODIUM 5000 [USP'U]/ML
5000 INJECTION, SOLUTION INTRAVENOUS; SUBCUTANEOUS EVERY 12 HOURS SCHEDULED
Status: DISCONTINUED | OUTPATIENT
Start: 2022-05-04 | End: 2022-05-05 | Stop reason: HOSPADM

## 2022-05-04 RX ORDER — INSULIN LISPRO 100 [IU]/ML
6 INJECTION, SOLUTION INTRAVENOUS; SUBCUTANEOUS
Status: DISCONTINUED | OUTPATIENT
Start: 2022-05-04 | End: 2022-05-05 | Stop reason: HOSPADM

## 2022-05-04 RX ORDER — AZELASTINE 1 MG/ML
1 SPRAY, METERED NASAL 2 TIMES DAILY
Status: DISCONTINUED | OUTPATIENT
Start: 2022-05-04 | End: 2022-05-05 | Stop reason: HOSPADM

## 2022-05-04 RX ORDER — METOPROLOL SUCCINATE 50 MG/1
100 TABLET, EXTENDED RELEASE ORAL DAILY
Status: DISCONTINUED | OUTPATIENT
Start: 2022-05-04 | End: 2022-05-05 | Stop reason: HOSPADM

## 2022-05-04 RX ORDER — ASPIRIN 81 MG/1
81 TABLET ORAL DAILY
Status: DISCONTINUED | OUTPATIENT
Start: 2022-05-04 | End: 2022-05-05 | Stop reason: HOSPADM

## 2022-05-04 RX ORDER — OXYCODONE HCL 10 MG/1
10 TABLET, FILM COATED, EXTENDED RELEASE ORAL EVERY 12 HOURS SCHEDULED
Status: DISCONTINUED | OUTPATIENT
Start: 2022-05-04 | End: 2022-05-05 | Stop reason: HOSPADM

## 2022-05-04 RX ORDER — ISOSORBIDE MONONITRATE 30 MG/1
30 TABLET, EXTENDED RELEASE ORAL
Status: DISCONTINUED | OUTPATIENT
Start: 2022-05-05 | End: 2022-05-05 | Stop reason: HOSPADM

## 2022-05-04 RX ORDER — DULOXETIN HYDROCHLORIDE 30 MG/1
30 CAPSULE, DELAYED RELEASE ORAL DAILY
Status: DISCONTINUED | OUTPATIENT
Start: 2022-05-05 | End: 2022-05-05 | Stop reason: HOSPADM

## 2022-05-04 RX ORDER — GABAPENTIN 300 MG/1
300 CAPSULE ORAL
Status: DISCONTINUED | OUTPATIENT
Start: 2022-05-04 | End: 2022-05-05 | Stop reason: HOSPADM

## 2022-05-04 RX ORDER — OXYBUTYNIN CHLORIDE 10 MG/1
10 TABLET, EXTENDED RELEASE ORAL DAILY
Status: DISCONTINUED | OUTPATIENT
Start: 2022-05-04 | End: 2022-05-05 | Stop reason: HOSPADM

## 2022-05-04 RX ORDER — INSULIN GLARGINE 100 [IU]/ML
18 INJECTION, SOLUTION SUBCUTANEOUS
Status: DISCONTINUED | OUTPATIENT
Start: 2022-05-04 | End: 2022-05-05 | Stop reason: HOSPADM

## 2022-05-04 RX ORDER — SERTRALINE HYDROCHLORIDE 100 MG/1
100 TABLET, FILM COATED ORAL DAILY
Status: DISCONTINUED | OUTPATIENT
Start: 2022-05-04 | End: 2022-05-04

## 2022-05-04 RX ORDER — SODIUM CHLORIDE 9 MG/ML
100 INJECTION, SOLUTION INTRAVENOUS CONTINUOUS
Status: DISPENSED | OUTPATIENT
Start: 2022-05-04 | End: 2022-05-05

## 2022-05-04 RX ORDER — CALCITRIOL 0.25 UG/1
0.25 CAPSULE, LIQUID FILLED ORAL DAILY
Status: DISCONTINUED | OUTPATIENT
Start: 2022-05-04 | End: 2022-05-05 | Stop reason: HOSPADM

## 2022-05-04 RX ORDER — DULOXETIN HYDROCHLORIDE 20 MG/1
20 CAPSULE, DELAYED RELEASE ORAL DAILY
Status: DISCONTINUED | OUTPATIENT
Start: 2022-05-04 | End: 2022-05-04

## 2022-05-04 RX ORDER — PANTOPRAZOLE SODIUM 40 MG/1
40 TABLET, DELAYED RELEASE ORAL
Status: DISCONTINUED | OUTPATIENT
Start: 2022-05-05 | End: 2022-05-05 | Stop reason: HOSPADM

## 2022-05-04 RX ORDER — OXYCODONE HCL 10 MG/1
10 TABLET, FILM COATED, EXTENDED RELEASE ORAL EVERY 12 HOURS SCHEDULED
COMMUNITY
End: 2022-05-16 | Stop reason: SDUPTHER

## 2022-05-04 RX ADMIN — BIMATOPROST 1 DROP: 0.1 SOLUTION/ DROPS OPHTHALMIC at 22:28

## 2022-05-04 RX ADMIN — INSULIN GLARGINE 18 UNITS: 100 INJECTION, SOLUTION SUBCUTANEOUS at 22:28

## 2022-05-04 RX ADMIN — OXYCODONE HYDROCHLORIDE 10 MG: 10 TABLET, FILM COATED, EXTENDED RELEASE ORAL at 22:28

## 2022-05-04 RX ADMIN — GABAPENTIN 300 MG: 300 CAPSULE ORAL at 22:28

## 2022-05-04 RX ADMIN — INSULIN LISPRO 6 UNITS: 100 INJECTION, SOLUTION INTRAVENOUS; SUBCUTANEOUS at 18:53

## 2022-05-04 RX ADMIN — HEPARIN SODIUM 5000 UNITS: 5000 INJECTION INTRAVENOUS; SUBCUTANEOUS at 22:28

## 2022-05-04 RX ADMIN — DOCUSATE SODIUM 100 MG: 100 CAPSULE, LIQUID FILLED ORAL at 18:53

## 2022-05-04 RX ADMIN — AZELASTINE HYDROCHLORIDE 1 SPRAY: 137 SPRAY, METERED NASAL at 18:53

## 2022-05-04 RX ADMIN — SODIUM CHLORIDE 500 ML: 0.9 INJECTION, SOLUTION INTRAVENOUS at 12:45

## 2022-05-04 NOTE — PROGRESS NOTES
Assessment/Plan:     Developed a syncopal episode while we were finishing the visit feel quite depressed today  He has had some suicidal thoughts but no plan will ready to send him home in developed syncope which she has had before  Is he had an audible blood pressure of 78 systolic unresponsive will call in the ambulance to transport him to the hospital    Glucose once 139    He is now awake blood pressure 134/60 pulse ox 93 now 97 on room air heart rate 55         Zoloft has been increased to 100 mg per day  Refer to psychiatry and may might have some therapy at the [place]    1  Bladder cancer being followed by Urology he does have bilateral the patient open is still has some and back and bladder discomfort  No fever or chills he disease of some hematuria which is normal according to the urologist    2  Coronary artery disease stable no angina no recent syncopal episodes  His blood pressure is control    3  Diabetes excellent control hemoglobin A1c 7 2 no polyuria polydipsia he does have endocrinology follow-up  4  Chronic renal failure will check renal function when he comes back  He is euvolemic  5  Depression due to situational in view of his diagnosis  He does have some suicidal thoughts but no plans will going to increase the Zoloft from  mg per day  Labs review    Results for orders placed or performed in visit on 05/04/22   POCT hemoglobin A1c   Result Value Ref Range    Hemoglobin A1C 7 2 (A) 6 5   POCT blood glucose   Result Value Ref Range    GLUCOSE       *Note: Due to a large number of results and/or encounters for the requested time period, some results have not been displayed  A complete set of results can be found in Results Review  No problem-specific Assessment & Plan notes found for this encounter         Diagnoses and all orders for this visit:    Type 2 diabetes mellitus with stage 3 chronic kidney disease, with long-term current use of insulin, unspecified whether stage 3a or 3b CKD ([place] [address] )    Diabetic polyneuropathy associated with type 2 diabetes mellitus ([place] [address] )    Coronary artery disease of native artery of native heart with stable angina pectoris ([place] [address] )    Arteriosclerosis of artery of extremity ([facility])    PAD (peripheral artery disease) ([facility])    CKD (chronic kidney disease) stage 4, GFR 15-29 ml/min ([facility])    Bladder carcinoma ([place] [address] )    Mixed hyperlipidemia          Subjective:      Patient ID: Surendra Alfred is a 76 y o  male  No chief complaint on file          Current Outpatient Medications:     Accu-Chek Softclix Lancets lancets, Test blood sugar 4 times daily, Disp: 200 each, Rfl: 0    acetaminophen (TYLENOL) 325 mg tablet, Take 650 mg by mouth every 6 (six) hours as needed for mild pain (Patient not taking: Reported on 4/15/2022 ), Disp: , Rfl:     ALPRAZolam (XANAX) 0 25 mg tablet, At twice a day as needed for anxiety, Disp: 30 tablet, Rfl: 0    aspirin (ECOTRIN LOW STRENGTH) 81 mg EC tablet, Take 1 tablet (81 mg total) by mouth daily, Disp: , Rfl:     Azelastine HCl 0 15 % SOLN, Inhale 1 spray 2 (two) times a day, Disp: 30 mL, Rfl: 3    Bimatoprost (LUMIGAN OP), Apply 1 drop to eye daily at bedtime , Disp: , Rfl:     calcitriol (ROCALTROL) 0 25 mcg capsule, Take 1 capsule (0 25 mcg total) by mouth daily, Disp: 90 capsule, Rfl: 0    docusate sodium (COLACE) 100 mg capsule, Take 1 capsule (100 mg total) by mouth 2 (two) times a day, Disp: 60 capsule, Rfl: 0    DULoxetine (CYMBALTA) 20 mg capsule, Take 1 capsule (20 mg total) by mouth daily, Disp: 90 capsule, Rfl: 0    ezetimibe (ZETIA) 10 mg tablet, Take 1 tablet (10 mg total) by mouth daily in the early morning, Disp: 90 tablet, Rfl: 0    Ferrous Sulfate Dried (Feosol) 200 (65 Fe) MG TABS, Take 65 mg by mouth daily, Disp: 90 tablet, Rfl: 0    fluocinonide (LIDEX) 0 05 % cream, Apply topically 2 (two) times a day (Patient taking differently: Apply topically as needed ), Disp: 30 g, Rfl: 0   fluticasone (FLONASE) 50 mcg/act nasal spray, 1 spray into each nostril daily (Patient taking differently: 1 spray into each nostril as needed ), Disp: 11 mL, Rfl: 1    furosemide (LASIX) 20 mg tablet, TAKE 1 TABLET BY MOUTH AS NEEDED FOR EDEMA, Disp: 90 tablet, Rfl: 1    gabapentin (Neurontin) 300 mg capsule, Take 1 capsule (300 mg total) by mouth daily at bedtime, Disp: 30 capsule, Rfl: 2    glucose blood (Accu-Chek Martha Plus) test strip, Test blood sugar 4 times daily, Disp: 200 strip, Rfl: 0    Insulin Pen Needle 31G X 8 MM MISC, Inject 3 times a day, Disp: 100 each, Rfl: 2    isosorbide mononitrate (IMDUR) 30 mg 24 hr tablet, Take 1 tablet (30 mg total) by mouth daily in the early morning, Disp: 90 tablet, Rfl: 0    Lantus SoloStar 100 units/mL injection pen, 18 units qhs (Patient taking differently: Inject 18 Units under the skin daily at bedtime ), Disp: 30 mL, Rfl: 1    lidocaine (XYLOCAINE) 2 % topical gel, Apply topically as needed for mild pain, Disp: 30 mL, Rfl: 0    lidocaine-prilocaine (EMLA) cream, Apply topically as needed for mild pain, Disp: 30 g, Rfl: 0    loperamide (IMODIUM) 2 mg capsule, Take 2 mg by mouth 4 (four) times a day as needed for diarrhea, Disp: , Rfl:     metoprolol succinate (TOPROL-XL) 100 mg 24 hr tablet, Take 1 tablet (100 mg total) by mouth daily for 7 days, Disp: 90 tablet, Rfl: 0    multivitamin (THERAGRAN) TABS, Take 1 tablet by mouth daily  , Disp: , Rfl:     naloxone (NARCAN) 4 mg/0 1 mL nasal spray, Administer 1 spray into a nostril   If no response after 2-3 minutes, give another dose in the other nostril using a new spray , Disp: 1 each, Rfl: 1    NovoLOG FlexPen 100 units/mL injection pen, 10 units with each meal  (Patient taking differently: Inject 10 Units under the skin 3 (three) times a day with meals ), Disp: 5 pen, Rfl: 1    omeprazole (PriLOSEC) 20 mg delayed release capsule, Take 20 mg by mouth daily in the early morning PRN , Disp: , Rfl:    oxybutynin (DITROPAN-XL) 10 MG 24 hr tablet, Take 1 tablet (10 mg total) by mouth daily, Disp: 30 tablet, Rfl: 0    polyethylene glycol (MIRALAX) 17 g packet, Take 17 g by mouth daily, Disp: 30 each, Rfl: 0    senna (SENOKOT) 8 6 MG tablet, Take 1 tablet (8 6 mg total) by mouth daily, Disp: 30 tablet, Rfl: 0    sertraline (ZOLOFT) 50 mg tablet, Take 1 tablet (50 mg total) by mouth daily, Disp: 90 tablet, Rfl: 0    sodium chloride, PF, 0 9 %, 10 mL by Intracatheter route daily Intracatheter flushing daily, Disp: 900 mL, Rfl: 0    sodium chloride, PF, 0 9 %, 10 mL by Intracatheter route daily Intracatheter flushing daily, Disp: 900 mL, Rfl: 0    sodium chloride, PF, 0 9 %, 10 mL by Intracatheter route daily Bilateral PCNs to be flushed daily with prefilled 10cc NS, Disp: 600 mL, Rfl: 3    tamsulosin (FLOMAX) 0 4 mg, Take 1 capsule (0 4 mg total) by mouth daily at bedtime, Disp: 90 capsule, Rfl: 0    HPI    The following portions of the patient's history were reviewed and updated as appropriate: allergies, current medications, past family history, past medical history, past social history, past surgical history and problem list       Review of Systems   Constitutional: Negative  Negative for activity change, appetite change, fatigue, fever and unexpected weight change  HENT: Negative for congestion, ear pain, hearing loss, mouth sores, postnasal drip, rhinorrhea, sore throat, trouble swallowing and voice change  Eyes: Negative for pain, redness and visual disturbance  Respiratory: Negative for cough, chest tightness, shortness of breath and wheezing  Cardiovascular: Negative for chest pain, palpitations and leg swelling  Gastrointestinal: Negative for abdominal distention, abdominal pain, blood in stool, constipation, diarrhea and nausea  Endocrine: Negative for cold intolerance, heat intolerance, polydipsia, polyphagia and polyuria     Genitourinary: Negative for difficulty urinating, dysuria, flank pain, frequency, hematuria and urgency  Musculoskeletal: Negative for arthralgias, back pain, gait problem, joint swelling and myalgias  Skin: Negative for color change and pallor  Neurological: Negative for dizziness, tremors, seizures, syncope, weakness, numbness and headaches  Hematological: Negative for adenopathy  Does not bruise/bleed easily  Psychiatric/Behavioral: Negative  Negative for sleep disturbance  The patient is not nervous/anxious  Objective: There were no vitals taken for this visit  Physical Exam  Vitals and nursing note reviewed  Constitutional:       Appearance: He is well-developed  HENT:      Head: Normocephalic  Right Ear: External ear normal       Left Ear: External ear normal       Nose: Nose normal       Mouth/Throat:      Pharynx: No oropharyngeal exudate  Eyes:      Conjunctiva/sclera: Conjunctivae normal       Pupils: Pupils are equal, round, and reactive to light  Neck:      Thyroid: No thyromegaly  Cardiovascular:      Rate and Rhythm: Normal rate and regular rhythm  Heart sounds: Normal heart sounds  No murmur heard  No friction rub  No gallop  Comments: S1-S2 regular rhythm  Extremities no edema calf tenderness  Pulmonary:      Effort: Pulmonary effort is normal  No respiratory distress  Breath sounds: Normal breath sounds  No wheezing or rales  Comments: Lungs are clear no wheezing rales or rhonchi    Bilateral renal ureteral stents noted  Abdominal:      General: Bowel sounds are normal  There is no distension  Palpations: Abdomen is soft  There is no mass  Tenderness: There is no abdominal tenderness  There is no guarding or rebound  Comments: Abdomen nontender   Musculoskeletal:         General: Normal range of motion  Cervical back: Normal range of motion and neck supple  Lymphadenopathy:      Cervical: No cervical adenopathy  Skin:     General: Skin is warm and dry     Neurological: Mental Status: He is alert and oriented to person, place, and time  Psychiatric:         Mood and Affect: Mood is depressed  Thought Content: Thought content includes suicidal ideation  Thought content does not include suicidal plan           Judgment: Judgment normal       Comments: Zoloft has been increased to 100 mg

## 2022-05-04 NOTE — ASSESSMENT & PLAN NOTE
· Patient reported suicidal thoughts with his PCP but no plan  · He also failed a depression screening in the ER  · Not actively suicidal at this time  · Psychiatric consult placed

## 2022-05-04 NOTE — ASSESSMENT & PLAN NOTE
· Cardiac arrhythmia versus hypovolemia versus neurologic (sleep attack) versus polypharmacy (chronic opioid/anti depressant)  · He has known history of syncope and has had a cardiac workup in March 2021  He had a 14 day event recorder which showed no events  He does have sinus bradycardia on current EKG  · Syncope occurred while sitting down at the end of his visit with PCP    · He reported sleeping only 3 hours last night  · Well score 1 due to history of malignancy (low probability for PE)  · Observe on telemetry overnight  · Check orthostatic vital signs  · Hydrate  · Watch for recurrent symptoms    · Fall precaution

## 2022-05-04 NOTE — PROGRESS NOTES
Assessment and Plan:     Problem List Items Addressed This Visit        Endocrine    Type 2 diabetes mellitus, with long-term current use of insulin ([facility] ) - Primary    Relevant Orders    POCT hemoglobin A1c    POCT blood glucose    Diabetic polyneuropathy associated with type 2 diabetes mellitus ([facility] )       Cardiovascular and Mediastinum    Coronary artery disease of native artery of native heart with stable angina pectoris (HCC)    Arteriosclerosis of artery of extremity (HCC)    PAD (peripheral artery disease) (HCC)       Genitourinary    CKD (chronic kidney disease) stage 4, GFR 15-29 ml/min (HCC) (Chronic)    Bladder carcinoma (HCC)       Other    Hyperlipidemia        BMI Counseling: Body mass index is 30 38 kg/m²  The BMI is above normal  Nutrition recommendations include decreasing portion sizes, encouraging healthy choices of fruits and vegetables, decreasing fast food intake, consuming healthier snacks, limiting drinks that contain sugar, moderation in carbohydrate intake, increasing intake of lean protein, reducing intake of saturated and trans fat and reducing intake of cholesterol  Exercise recommendations include exercising 3-5 times per week  No pharmacotherapy was ordered  Patient referred to PCP  Rationale for BMI follow-up plan is due to patient being overweight or obese  Preventive health issues were discussed with patient, and age appropriate screening tests were ordered as noted in patient's After Visit Summary  Personalized health advice and appropriate referrals for health education or preventive services given if needed, as noted in patient's After Visit Summary       History of Present Illness:     Patient presents for Medicare Annual Wellness visit    Patient Care Team:  Aniket Miller MD as PCP - General  Valerie Parekh DO as PCP - Endocrinology (Endocrinology)  Flakito Baker MD Moses Denise, DO Kandee Shire, DO Willian Norris, MD Sheldon Barrett, MD (Vascular Surgery)  Shaye Branch MD (Radiation Oncology)     Problem List:     Patient Active Problem List   Diagnosis    Coronary artery disease of native artery of native heart with stable angina pectoris ([facility] )    Bladder carcinoma ([facility] )    Hypertension with renal disease    Hyperlipidemia    Presence of stent in coronary artery    Type 2 diabetes mellitus, with long-term current use of insulin (HCC)    Primary osteoarthritis of left knee    Cystitis due to intravesical BCG administration    Degeneration of lumbar intervertebral disc    Back pain    Arteriosclerosis of artery of extremity ([address] )    Stable angina pectoris ([address] )    Herpes zoster without complication    Localized edema    Superficial phlebitis    Preop examination    Acute cystitis with hematuria    Acute renal failure (ARF) ([facility] )    Secondary renal hyperparathyroidism ([address] )    Malignant neoplasm of anterior wall of urinary bladder ([facility])    Abnormal MRI, lumbar spine    Diabetic polyneuropathy associated with type 2 diabetes mellitus ([address] )    Dysuria    Diabetic ulcer of left foot associated with type 2 diabetes mellitus, 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 ([facility])    Acute kidney injury superimposed on CKD (HCC)    Chronic anemia    Anemia of chronic disease    Preoperative clearance    PAD (peripheral artery disease) ([facility])    Left carotid bruit    Gross hematuria    Chronic bronchitis ([facility])    Moderate major depression, single episode (HCC)    Thrombocytopenia ([facility])    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    Lumbar spondylosis    Chronic pain syndrome    Acute urinary retention    Hematuria    Continuous opioid dependence ([address] )    Port-A-Cath in place    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[address] )    Embolism and 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 ([facility])    Abnormal CT scan, bladder    Retained ureteral stent    Fall    Hyperkalemia    CKD (chronic kidney disease) stage 4, GFR 15-29 ml/min [facility])    Bilateral hydronephrosis    Cancer related pain      Past Medical and Surgical History:     Past Medical History:   Diagnosis Date    Anemia     Last assessed: 9/28/17    Anxiety     Arteriosclerotic cardiovascular disease     Last assessed: 9/28/17    Arthritis     Bladder cancer ([facility] )     bladder- had BCG treatments    Chronic kidney disease     Stage IV    CKD (chronic kidney disease) stage 4, GFR 15-29 ml/min ([facility])     Colon polyp     Coronary artery disease     7 stents    Depression     Diabetes mellitus ([facility])     IDDM    GERD (gastroesophageal reflux disease)     Glaucoma     Hematuria     History of fusion of cervical spine     Hyperlipidemia     Hypertension     Insomnia     Last assessed: 11/14/12    Loss of hearing     has hearing aids but usually does not wear them    Other seasonal allergic rhinitis     Last assessed: 2/10/16    PAD (peripheral artery disease) ([facility])     Shortness of breath     on exertion    Spinal stenosis of lumbar region     Transient cerebral ischemia     No Residual    Uses walker     w/c for longer distances     Past Surgical History:   Procedure Laterality Date    CARDIAC SURGERY      Cath stent placement  Last assessed: 3/9/17  Interventional Catheterization    CHOLECYSTECTOMY      COLONOSCOPY      CYSTOSCOPY      Diagnostic w/biopsy  Jason Ann  Last assessed: 12/1/14    CYSTOSCOPY N/A 4/12/2022    Procedure: CYSTOSCOPY  Bladder biopsies  ;  Surgeon: Ramila Caro MD;  [facility];  Service: Urology    CYSTOSCOPY W/ RETROGRADES Right 3/1/2022    Procedure: CYSTO; stent removal retrograde;  Surgeon: Ramila Caro MD;  [facility];  Service: Urology    CYSTOSCOPY W/ URETERAL STENT PLACEMENT Bilateral 10/18/2021    Procedure: bilateral retrogrades, cytology collection;  Surgeon: Ramila Caro MD;  [facility];  Service: Urology    CYSTOURETHROSCOPY      w/cautery   Jason Ann    FL RETROGRADE PYELOGRAM  10/18/2021    FL RETROGRADE PYELOGRAM  10/24/2021    GASTRIC BYPASS      For morbid obesity w/Shaji-en-Y   Resolved: 11/17/09    INCISION AND DRAINAGE OF WOUND Right 2/26/2017    Procedure: INCISION AND DRAINAGE (I&D) EXTREMITY WITH APPLICATION OF GRAFT JACKET;  Surgeon: Karla Azar DPM;  [facility];  Service:     INCISION AND DRAINAGE OF WOUND Right 4/25/2017    Procedure: INCISION AND DRAINAGE (I&D) EXTREMITY, APPLICATION OF GRAFT;  Surgeon: Karla Azar DPM;  [facility];  Service:     IR BIOPSY OTHER  7/2/2020    IR LOWER EXTREMITY ANGIOGRAM  2/8/2021    IR LOWER EXTREMITY ANGIOGRAM  2/11/2021    IR NEPHROSTOMY TUBE CHECK/CHANGE/REPOSITION/REINSERTION/UPSIZE  4/28/2022    IR NEPHROSTOMY TUBE PLACEMENT  2/25/2022    IR PORT PLACEMENT  1/17/2022    IR TUNNELED CENTRAL LINE PLACEMENT  12/24/2020    JOINT REPLACEMENT      christofer knees replaced    ID AMPUTATION METATARSAL+TOE,SINGLE Left 12/21/2020    Procedure: RAY RESECTION FOOT;  Surgeon: Lizz Chin DPM;  [facility];  Service: Podiatry    ID AMPUTATION METATARSAL+TOE,SINGLE Left 12/31/2020    Procedure: 5TH MET RESECTION;  Surgeon: Lizz Chin DPM;  [facility];  Service: Podiatry    ID CYSTOURETHROSCOPY W/IRRIG & EVAC CLOTS N/A 2/10/2021    Procedure: CYSTOSCOPY EVACUATION OF CLOTS, fulguration;  Surgeon: Osorio Mcclelland MD;  [facility];  Service: Urology    ID CYSTOURETHROSCOPY W/IRRIG & EVAC CLOTS N/A 10/24/2021    Procedure: CYSTOSCOPY EVACUATION OF CLOT, fulguration of bleeding vessels, right ureter stent placement, retrograde pyelogram;  Surgeon: Mimi Cote MD;  [facility];  Service: Urology    ID CYSTOURETHROSCOPY,BIOPSY N/A 8/16/2016    Procedure: CYSTOSCOPY WITH BIOPSIES;  Surgeon: Sylvester Luna MD;  [facility];  Service: Urology    ID CYSTOURETHROSCOPY,FULGUR <0 5 CM LESN N/A 11/19/2020    Procedure: CYSTO W/BIOPSIES, transurethral prostate bx;  Surgeon: Cheo Michel MD;  [facility];  Service: Urology    TX CYSTOURETHROSCOPY,FULGUR >5 CM LESN Bilateral 10/18/2021    Procedure: TRANSURETHRAL RESECTION OF BLADDER TUMOR (TURBT);   Surgeon: Abbey Bowen MD;  [facility];  Service: Urology    TX Reggy Remington 3RD+ ORD [address] PEL/LXTR [facility] Left 2021    Procedure: LEG angiogram, CO2 w/limited contrast with balloon angioplasty postertior tibial artery;  Surgeon: Suman Alston MD;  [facility];  Service: Vascular    ROTATOR CUFF REPAIR      SMALL INTESTINE SURGERY      Surgery Shaji-en-Y    SPINAL FUSION      lumbar and cervical fusions    VAC DRESSING APPLICATION Right     Procedure: APPLICATION VAC DRESSING;  Surgeon: Bozena Bell DPM;  [facility];  Service:    [address] [place] [address] Left 2021    Procedure: FOOT DEBRIDE, [facility] OUT w/graft application;  Surgeon: Bozena Bell DPM;  [facility];  Service: Podiatry      Family History:     Family History   Problem Relation Age of Onset    Diabetes Mother     Heart disease Mother     Other Mother         High blood pressure    Heart disease Father     Diabetes Sister     Other Sister         High blood pressure    Kidney disease Sister     Heart disease Brother     Other Brother         High blood pressure      Social History:     Social History     Socioeconomic History    Marital status: /Civil Union     Spouse name: Not on file    Number of children: Not on file    Years of education: Not on file    Highest education level: Not on file   Occupational History    Not on file   Tobacco Use    Smoking status: Former Smoker     Packs/day: 3 00     Years: 27 00     Pack years: 81 00     Types: Cigarettes     Quit date: 1970     Years since quittin 3    Smokeless tobacco: Never Used   Vaping Use    Vaping Use: Never used   Substance and Sexual Activity    Alcohol use: Never     Comment: beer / liquor    Drug use: Not Currently     Types: Marijuana     Comment: quit 2019 had medical marijuana    Sexual activity: Not Currently   Other Topics Concern    Not on file   Social History Narrative    Consumes 1 cup of coffee and 1 soda per day     Social Determinants of Health     Financial Resource Strain: Not on file   Food Insecurity: Not on file   Transportation Needs: Not on file   Physical Activity: Not on file   Stress: Not on file   Social Connections: Not on file   Intimate Partner Violence: Not on file   Housing Stability: Not on file      Medications and Allergies:     Current Outpatient Medications   Medication Sig Dispense Refill    Accu-Chek Softclix Lancets lancets Test blood sugar 4 times daily 200 each 0    acetaminophen (TYLENOL) 325 mg tablet Take 650 mg by mouth every 6 (six) hours as needed for mild pain (Patient not taking: Reported on 4/15/2022 )      ALPRAZolam (XANAX) 0 25 mg tablet At twice a day as needed for anxiety 30 tablet 0    aspirin (ECOTRIN LOW STRENGTH) 81 mg EC tablet Take 1 tablet (81 mg total) by mouth daily      Azelastine HCl 0 15 % SOLN Inhale 1 spray 2 (two) times a day 30 mL 3    Bimatoprost (LUMIGAN OP) Apply 1 drop to eye daily at bedtime       calcitriol (ROCALTROL) 0 25 mcg capsule Take 1 capsule (0 25 mcg total) by mouth daily 90 capsule 0    docusate sodium (COLACE) 100 mg capsule Take 1 capsule (100 mg total) by mouth 2 (two) times a day 60 capsule 0    DULoxetine (CYMBALTA) 20 mg capsule Take 1 capsule (20 mg total) by mouth daily 90 capsule 0    ezetimibe (ZETIA) 10 mg tablet Take 1 tablet (10 mg total) by mouth daily in the early morning 90 tablet 0    Ferrous Sulfate Dried (Feosol) 200 (65 Fe) MG TABS Take 65 mg by mouth daily 90 tablet 0    fluocinonide (LIDEX) 0 05 % cream Apply topically 2 (two) times a day (Patient taking differently: Apply topically as needed ) 30 g 0    fluticasone (FLONASE) 50 mcg/act nasal spray 1 spray into each nostril daily (Patient taking differently: 1 spray into each nostril as needed ) 11 mL 1    furosemide (LASIX) 20 mg tablet TAKE 1 TABLET BY MOUTH AS NEEDED FOR EDEMA 90 tablet 1    gabapentin (Neurontin) 300 mg capsule Take 1 capsule (300 mg total) by mouth daily at bedtime 30 capsule 2    glucose blood (Accu-Chek Martha Plus) test strip Test blood sugar 4 times daily 200 strip 0    Insulin Pen Needle 31G X 8 MM MISC Inject 3 times a day 100 each 2    isosorbide mononitrate (IMDUR) 30 mg 24 hr tablet Take 1 tablet (30 mg total) by mouth daily in the early morning 90 tablet 0    Lantus SoloStar 100 units/mL injection pen 18 units qhs (Patient taking differently: Inject 18 Units under the skin daily at bedtime ) 30 mL 1    lidocaine (XYLOCAINE) 2 % topical gel Apply topically as needed for mild pain 30 mL 0    lidocaine-prilocaine (EMLA) cream Apply topically as needed for mild pain 30 g 0    loperamide (IMODIUM) 2 mg capsule Take 2 mg by mouth 4 (four) times a day as needed for diarrhea      metoprolol succinate (TOPROL-XL) 100 mg 24 hr tablet Take 1 tablet (100 mg total) by mouth daily for 7 days 90 tablet 0    multivitamin (THERAGRAN) TABS Take 1 tablet by mouth daily   naloxone (NARCAN) 4 mg/0 1 mL nasal spray Administer 1 spray into a nostril  If no response after 2-3 minutes, give another dose in the other nostril using a new spray   1 each 1    NovoLOG FlexPen 100 units/mL injection pen 10 units with each meal  (Patient taking differently: Inject 10 Units under the skin 3 (three) times a day with meals ) 5 pen 1    omeprazole (PriLOSEC) 20 mg delayed release capsule Take 20 mg by mouth daily in the early morning PRN       oxybutynin (DITROPAN-XL) 10 MG 24 hr tablet Take 1 tablet (10 mg total) by mouth daily 30 tablet 0    polyethylene glycol (MIRALAX) 17 g packet Take 17 g by mouth daily 30 each 0    senna (SENOKOT) 8 6 MG tablet Take 1 tablet (8 6 mg total) by mouth daily 30 tablet 0    sertraline (ZOLOFT) 50 mg tablet Take 1 tablet (50 mg total) by mouth daily 90 tablet 0    sodium chloride, PF, 0 9 % 10 mL by Intracatheter route daily Intracatheter flushing daily 900 mL 0    sodium chloride, PF, 0 9 % 10 mL by Intracatheter route daily Intracatheter flushing daily 900 mL 0    sodium chloride, PF, 0 9 % 10 mL by Intracatheter route daily Bilateral PCNs to be flushed daily with prefilled 10cc  mL 3    tamsulosin (FLOMAX) 0 4 mg Take 1 capsule (0 4 mg total) by mouth daily at bedtime 90 capsule 0     No current facility-administered medications for this visit       Allergies   Allergen Reactions    Atorvastatin Hives, Itching and Rash    Simvastatin Rash and Edema     Edema of lower legs    Statins Hives and Itching    Insulin Lispro Swelling and Edema     " Lower Legs"    Other Itching, Rash and Other (See Comments)     "EKG Patches"   "blue EKG patches"      Immunizations:     Immunization History   Administered Date(s) Administered    COVID-19 PFIZER VACCINE 0 3 ML IM 02/26/2021, 03/18/2021, 08/28/2021    DT (pediatric) 01/24/2014    DTaP 5 07/01/2007    H1N1, All Formulations 12/16/2009    INFLUENZA 10/12/2007, 10/06/2008, 11/01/2008, 09/14/2009, 09/30/2010, 10/01/2011, 10/09/2012, 11/10/2013, 12/01/2014, 01/26/2015, 01/23/2017, 09/28/2017, 10/15/2018, 01/04/2019, 10/29/2019    Influenza Quadrivalent, 6-35 Months IM 11/14/2014    Influenza Split High Dose Preservative Free IM 01/23/2017, 09/28/2017    Influenza, high dose seasonal 0 7 mL 11/01/2018, 10/09/2020, 09/29/2021    Influenza, seasonal, injectable 10/06/2011, 11/13/2013, 09/15/2015    Influenza, seasonal, injectable, preservative free 09/23/2015    Pneumococcal 01/01/2007, 10/01/2017    Pneumococcal Conjugate 13-Valent 08/04/2015, 09/23/2015, 10/01/2017    Pneumococcal Polysaccharide PPV23 03/01/2004, 11/12/2008, 07/10/2013, 09/15/2017    Td (adult), Unspecified 09/30/2010    Tdap 08/15/2014    Tuberculin Skin Test-PPD Intradermal 05/08/2019      Health Maintenance:         Topic Date Due    Hepatitis C Screening  Completed     There are no preventive care reminders to display for this patient  Medicare Health Risk Assessment:     There were no vitals taken for this visit  Louis Rasmussen is here for his Subsequent Wellness visit  Health Risk Assessment:   Patient rates overall health as poor  Patient feels that their physical health rating is slightly worse  Patient is dissatisfied with their life  Eyesight was rated as slightly worse  Hearing was rated as much worse  Patient feels that their emotional and mental health rating is slightly worse  Patients states they are often angry  Patient states they are sometimes unusually tired/fatigued  Pain experienced in the last 7 days has been a lot  Patient's pain rating has been 7/10  Patient states that he has experienced no weight loss or gain in last 6 months  Depression Screening:   PHQ-9 Score: 15      Fall Risk Screening: In the past year, patient has experienced: history of falling in past year    Number of falls: 2 or more  Injured during fall?: Yes    Feels unsteady when standing or walking?: Yes    Worried about falling?: Yes      Home Safety:  Patient has trouble with stairs inside or outside of their home  Patient has working smoke alarms and has working carbon monoxide detector  Home safety hazards include: none  He has a stair glide  Nutrition:   Current diet is Diabetic, No Added Salt and Limited junk food  Medications:   Patient is currently taking over-the-counter supplements  OTC medications include: see medication list  Patient is able to manage medications  Activities of Daily Living (ADLs)/Instrumental Activities of Daily Living (IADLs):   Walk and transfer into and out of bed and chair?: Yes  Dress and groom yourself?: Yes    Bathe or shower yourself?: Yes    Feed yourself?  Yes  Do your laundry/housekeeping?: Yes  Manage your money, pay your bills and track your expenses?: Yes  Make your own meals?: Yes    Do your own shopping?: No    Previous Hospitalizations:   Any hospitalizations or ED visits within the last 12 months?: Yes    How many hospitalizations have you had in the last year?: more than 4    Hospitalization Comments: 10 admissions    Advance Care Planning:   Living will: Yes    Durable POA for healthcare: Yes    Advanced directive: Yes    End of Life Decisions reviewed with patient: Yes    Provider agrees with end of life decisions: Yes      PREVENTIVE SCREENINGS      Cardiovascular Screening:    General: Screening Not Indicated and History Lipid Disorder      Diabetes Screening:     General: Screening Not Indicated and History Diabetes      Prostate Cancer Screening:    General: Screening Not Indicated      Osteoporosis Screening:    General: Screening Not Indicated and History Osteoporosis      Abdominal Aortic Aneurysm (AAA) Screening:    Risk factors include: tobacco use        Lung Cancer Screening:     General: Screening Not Indicated      Hepatitis C Screening:    General: Screening Current    Screening, Brief Intervention, and Referral to Treatment (SBIRT)    Screening  Typical number of drinks in a day: 0  Typical number of drinks in a week: 0  Interpretation: Low risk drinking behavior  Single Item Drug Screening:  How often have you used an illegal drug (including marijuana) or a prescription medication for non-medical reasons in the past year? never    Single Item Drug Screen Score: 0  Interpretation: Negative screen for possible drug use disorder    Review of Current Opioid Use    Opioid Risk Tool (ORT) Interpretation: Complete Opioid Risk Tool (ORT)    Other Counseling Topics:   Car/seat belt/driving safety, skin self-exam, sunscreen and calcium and vitamin D intake and regular weightbearing exercise         Teri Donaldson MD

## 2022-05-04 NOTE — PATIENT INSTRUCTIONS

## 2022-05-04 NOTE — CONSULTS
TeleConsultation - [address] El[address] Gorman 76 y o  male MRN: [id number]  Unit/Bed#: E4 -01 Encounter: [id number]        REQUIRED DOCUMENTATION:     1  This service was provided via Telemedicine  2  Provider located at [facility]   3  TeleMed provider: Ratna Darnell MD   4  Identify all parties in room with patient during tele consult: Patient Only   5  Patient was then informed that this was a Telemedicine visit and that the exam was being conducted confidentially over secure lines  My office door was closed  No one else was in the room  Patient acknowledged consent and understanding of privacy and security of the Telemedicine visit, and gave us permission to have the assistant stay in the room in order to assist with the history and to conduct the exam   I informed the patient that I have reviewed their record in Epic and presented the opportunity for them to ask any questions regarding the visit today  The patient agreed to participate  Assessment/Plan     Assessment:  Christen Ingram is a 77 y/o male  with PMH significant for Depression, CKD IV, CAD, HTN, IDDM and Bladder Cancer that presented after a Syncopal Episode during a visit with his PCP and was noted to be depressed  Patient presents with classic signs and symptoms consistent with major depression not adequately treated with his current medication regiment and recommend discontinuing sertraline and increasing duloxetine to 30 mg every morning (renal dosing) as well as starting aripiprazole 2 mg daily for mood augmentation  Patient would benefit from establishing with an outpatient therapist but does not require involuntary psychiatric admission or       Plan:   Risks, benefits and possible side effects of Medications:   Risks, benefits, and possible side effects of medications explained to patient and patient verbalizes understanding         -Stop Sertraline 100 mg qday    -Increase Duloxetine to 30 mg qam    -Start Abilify 2 mg qday      Chief Complaint: " I have been depressed"    History of Present Illness     Reason for Consult / Principal Problem: Depression    Patient states he has a long family history of depression which has been exacerbated by his bladder cancer  Patient states he feels his medications are not working and is not able to sleep  Patient also states that he has not been able to sleep more than a few hours for many years and that he has a chronic tinnitus which also exacerbates his depression  Patient denied any active suicidal ideation to include plan or intent  Inpatient consult to Psychiatry  Consult performed by: Lakshmi Helms MD  Consult ordered by: Kira Pineda MD          Psychiatric Review Of Systems:  sleep: yes  appetite changes: yes  weight changes: yes  energy/anergy: yes  interest/pleasure/anhedonia: yes  somatic symptoms: no  anxiety/panic: no  cielo: no  guilty/hopeless: yes  self injurious behavior/risky behavior: no    Historical Information   Past Psychiatric History: In Patient None, Has principally worked with his primary care doctor  Currently in treatment with PCP  Past Suicide attempts: None  Past Violent behavior: None  Past Psychiatric medication trial: Sertraline and Duloxetine    Substance Abuse History:  Denied  Use of Alcohol: denied    Longest clean time: years  History of IP/OP rehabilitation program: Denied  Smoking history: Denied  Use of Caffeine: denies use    Family Psychiatric History:   Denied    Social History: Patient reports living alone  Education: high school diploma/GED  Learning Disabilities: Denied  Marital history: single  Living arrangement, social support: The patient alone  Occupational History: retired  Functioning Relationships: good support system    Other Pertinent History: None    Traumatic History:   Abuse: Denied  Other Traumatic Events: Denied    Past Medical History:   Diagnosis Date    Anemia     Last assessed: 9/28/17  Anxiety     Arteriosclerotic cardiovascular disease     Last assessed: 9/28/17    Arthritis     Bladder cancer ([facility])     bladder- had BCG treatments    Chronic kidney disease     Stage IV    CKD (chronic kidney disease) stage 4, GFR 15-29 ml/min ([facility])     Colon polyp     Coronary artery disease     7 stents    Depression     Diabetes mellitus ([facility])     IDDM    GERD (gastroesophageal reflux disease)     Glaucoma     Hematuria     History of fusion of cervical spine     Hyperlipidemia     Hypertension     Insomnia     Last assessed: 11/14/12    Loss of hearing     has hearing aids but usually does not wear them    Other seasonal allergic rhinitis     Last assessed: 2/10/16    PAD (peripheral artery disease) ([facility])     Shortness of breath     on exertion    Spinal stenosis of lumbar region     Transient cerebral ischemia     No Residual    Uses walker     w/c for longer distances       Medical Review Of Systems:  Review of Systems    Meds/Allergies   all current active meds have been reviewed  Allergies   Allergen Reactions    Atorvastatin Hives, Itching and Rash    Simvastatin Rash and Edema     Edema of lower legs    Statins Hives and Itching    Insulin Lispro Swelling and Edema     " Lower Legs"    Other Itching, Rash and Other (See Comments)     "EKG Patches"   "blue EKG patches"       Objective   Vital signs in last 24 hours:  Temp:  [95 5 °F (35 3 °C)-98 4 °F (36 9 °C)] 95 5 °F (35 3 °C)  HR:  [56-80] 61  Resp:  [14-18] 18  BP: (104-157)/(64-93) 157/93      Intake/Output Summary (Last 24 hours) at 5/4/2022 1743  Last data filed at 5/4/2022 1621  Gross per 24 hour   Intake 740 ml   Output 425 ml   Net 315 ml       Mental Status Evaluation:  Appearance:  age appropriate   Behavior:  normal   Speech:  normal pitch and normal volume   Mood:  constricted   Affect:  constricted and mood-congruent   Language: naming objects   Thought Process:  logical   Thought Content:  normal Perceptual Disturbances: None   Risk Potential: Denied   Sensorium:  person, place, time/date, situation and day of week   Cognition:  recent and remote memory grossly intact   Consciousness:  alert    Attention: attention span and concentration were age appropriate   Intellect: within normal limits   Fund of Knowledge: awareness of current events: President   Insight:  fair   Judgment: fair   Muscle Strength and Tone: NFT   Gait/Station: normal gait/station   Motor Activity: no abnormal movements     Lab Results: Reviewed  Imaging Studies:  Reviewed  EKG, Pathology, and Other Studies:  Reviewed    Code Status: Level 1 - Full Code  Advance Directive and Living Will:      Power of :    POLST:      Counseling / Coordination of Care  Total floor / unit time spent today 30 minutes  Greater than 50% of total time was spent with the patient and / or family counseling and / or coordination of care   A description of the counseling / coordination of care: Direct Patient Care

## 2022-05-04 NOTE — ASSESSMENT & PLAN NOTE
Lab Results   Component Value Date    HGBA1C 7 2 (A) 05/04/2022     Placed on Lantus 18 units at bedtime and Humalog 8 units with meals    Watch for hypoglycemia

## 2022-05-04 NOTE — ASSESSMENT & PLAN NOTE
Creatinine is slightly above baseline  Will hydrate with normal saline 100 mL/hour  Repeat BMP in a m

## 2022-05-04 NOTE — ED PROVIDER NOTES
History  Chief Complaint   Patient presents with    Syncope     Had a syncopal episode while at appointment, hypotensive in the 70s  Denies headache, chest pain, dizziness at this time  Awake and alert  75-year-old male was at the family doctor's office today and had a syncopal episode while sitting  They were able to bring him to the ground, he did not hit his head  Initially his sbp was in the 70's  Patient has a history of bladder cancer (last had chemo about a month ago), CKD, CAD  No fevers, no cough, no shortness of breath, no chest pain, no abdominal pain, no nausea/vomiting/diarrhea, no headaches  No  PE history - not on blood thinners  Prior to Admission Medications   Prescriptions Last Dose Informant Patient Reported? Taking?    ALPRAZolam (XANAX) 0 25 mg tablet  Self No Yes   Sig: At twice a day as needed for anxiety   Accu-Chek Softclix Lancets lancets   No Yes   Sig: Test blood sugar 4 times daily   Azelastine HCl 0 15 % SOLN  Self No Yes   Sig: Inhale 1 spray 2 (two) times a day   Bimatoprost (LUMIGAN OP)  Self Yes Yes   Sig: Apply 1 drop to eye daily at bedtime    DULoxetine (CYMBALTA) 20 mg capsule   No Yes   Sig: Take 1 capsule (20 mg total) by mouth daily   Ferrous Sulfate Dried (Feosol) 200 (65 Fe) MG TABS   No Yes   Sig: Take 65 mg by mouth daily   Insulin Pen Needle 31G X 8 MM MISC  Self No Yes   Sig: Inject 3 times a day   Lantus SoloStar 100 units/mL injection pen  Self No Yes   Si units qhs   Patient taking differently: Inject 18 Units under the skin daily at bedtime    NovoLOG FlexPen 100 units/mL injection pen  Self No Yes   Sig: 10 units with each meal    Patient taking differently: Inject 10 Units under the skin 3 (three) times a day with meals    acetaminophen (TYLENOL) 325 mg tablet  Self Yes Yes   Sig: Take 650 mg by mouth every 6 (six) hours as needed for mild pain     aspirin (ECOTRIN LOW STRENGTH) 81 mg EC tablet  Self No Yes   Sig: Take 1 tablet (81 mg total) by mouth daily   calcitriol (ROCALTROL) 0 25 mcg capsule   No Yes   Sig: Take 1 capsule (0 25 mcg total) by mouth daily   docusate sodium (COLACE) 100 mg capsule   No Yes   Sig: Take 1 capsule (100 mg total) by mouth 2 (two) times a day   ezetimibe (ZETIA) 10 mg tablet   No Yes   Sig: Take 1 tablet (10 mg total) by mouth daily in the early morning   fluocinonide (LIDEX) 0 05 % cream  Self No Yes   Sig: Apply topically 2 (two) times a day   Patient taking differently: Apply topically as needed    fluticasone (FLONASE) 50 mcg/act nasal spray  Self No Yes   Si spray into each nostril daily   Patient taking differently: 1 spray into each nostril as needed    furosemide (LASIX) 20 mg tablet  Self No Yes   Sig: TAKE 1 TABLET BY MOUTH AS NEEDED FOR EDEMA   gabapentin (Neurontin) 300 mg capsule   No Yes   Sig: Take 1 capsule (300 mg total) by mouth daily at bedtime   glucose blood (Accu-Chek Martha Plus) test strip   No Yes   Sig: Test blood sugar 4 times daily   isosorbide mononitrate (IMDUR) 30 mg 24 hr tablet   No Yes   Sig: Take 1 tablet (30 mg total) by mouth daily in the early morning   lidocaine (XYLOCAINE) 2 % topical gel   No Yes   Sig: Apply topically as needed for mild pain   lidocaine-prilocaine (EMLA) cream  Self No Yes   Sig: Apply topically as needed for mild pain   loperamide (IMODIUM) 2 mg capsule  Self Yes Yes   Sig: Take 2 mg by mouth 4 (four) times a day as needed for diarrhea   metoprolol succinate (TOPROL-XL) 100 mg 24 hr tablet   No Yes   Sig: Take 1 tablet (100 mg total) by mouth daily for 7 days   multivitamin (THERAGRAN) TABS  Self Yes Yes   Sig: Take 1 tablet by mouth daily  naloxone (NARCAN) 4 mg/0 1 mL nasal spray  Self No Yes   Sig: Administer 1 spray into a nostril  If no response after 2-3 minutes, give another dose in the other nostril using a new spray     omeprazole (PriLOSEC) 20 mg delayed release capsule  Self Yes Yes   Sig: Take 20 mg by mouth daily in the early morning PRN oxyCODONE (OxyCONTIN) 10 mg 12 hr tablet   Yes Yes   Sig: Take 10 mg by mouth every 12 (twelve) hours   oxybutynin (DITROPAN-XL) 10 MG 24 hr tablet   No Yes   Sig: Take 1 tablet (10 mg total) by mouth daily   sertraline (ZOLOFT) 100 mg tablet   No Yes   Sig: Take 1 tablet (100 mg total) by mouth daily   sodium chloride, PF, 0 9 %   No Yes   Sig: 10 mL by Intracatheter route daily Intracatheter flushing daily   sodium chloride, PF, 0 9 %   No Yes   Sig: 10 mL by Intracatheter route daily Intracatheter flushing daily   sodium chloride, PF, 0 9 %   No Yes   Sig: 10 mL by Intracatheter route daily Bilateral PCNs to be flushed daily with prefilled 10cc NS      Facility-Administered Medications: None       Past Medical History:   Diagnosis Date    Anemia     Last assessed: 9/28/17    Anxiety     Arteriosclerotic cardiovascular disease     Last assessed: 9/28/17    Arthritis     Bladder cancer ([facility] [address] )     bladder- had BCG treatments    Chronic kidney disease     Stage IV    CKD (chronic kidney disease) stage 4, GFR 15-29 ml/min ([facility])     Colon polyp     Coronary artery disease     7 stents    Depression     Diabetes mellitus ([facility])     IDDM    GERD (gastroesophageal reflux disease)     Glaucoma     Hematuria     History of fusion of cervical spine     Hyperlipidemia     Hypertension     Insomnia     Last assessed: 11/14/12    Loss of hearing     has hearing aids but usually does not wear them    Other seasonal allergic rhinitis     Last assessed: 2/10/16    PAD (peripheral artery disease) ([facility])     Shortness of breath     on exertion    Spinal stenosis of lumbar region     Transient cerebral ischemia     No Residual    Uses walker     w/c for longer distances       Past Surgical History:   Procedure Laterality Date    CARDIAC SURGERY      Cath stent placement  Last assessed: 3/9/17  Interventional Catheterization    CHOLECYSTECTOMY      COLONOSCOPY      CYSTOSCOPY      Diagnostic w/biopsy  Petron[place] Last  Last assessed: 12/1/14    CYSTOSCOPY N/A 4/12/2022    Procedure: CYSTOSCOPY  Bladder biopsies  ;  Surgeon: Alia Amezcua MD;  [facility];  Service: Urology    CYSTOSCOPY W/ RETROGRADES Right 3/1/2022    Procedure: CYSTO; stent removal retrograde;  Surgeon: Alia Amezcua MD;  [facility];  Service: Urology    CYSTOSCOPY W/ URETERAL STENT PLACEMENT Bilateral 10/18/2021    Procedure: bilateral retrogrades, cytology collection;  Surgeon: Alia Amezcua MD;  [facility];  Service: Urology    CYSTOURETHROSCOPY      w/cautery  Petrona Last    FL RETROGRADE PYELOGRAM  10/18/2021    FL RETROGRADE PYELOGRAM  10/24/2021    GASTRIC BYPASS      For morbid obesity w/Shaji-en-Y   Resolved: 11/17/09    INCISION AND DRAINAGE OF WOUND Right 2/26/2017    Procedure: INCISION AND DRAINAGE (I&D) EXTREMITY WITH APPLICATION OF GRAFT JACKET;  Surgeon: Patrice Hernandez DPM;  [facility];  Service:     INCISION AND DRAINAGE OF WOUND Right 4/25/2017    Procedure: INCISION AND DRAINAGE (I&D) EXTREMITY, APPLICATION OF GRAFT;  Surgeon: Patrice Hernandez DPM;  [facility];  Service:     IR BIOPSY OTHER  7/2/2020    IR LOWER EXTREMITY ANGIOGRAM  2/8/2021    IR LOWER EXTREMITY ANGIOGRAM  2/11/2021    IR NEPHROSTOMY TUBE CHECK/CHANGE/REPOSITION/REINSERTION/UPSIZE  4/28/2022    IR NEPHROSTOMY TUBE PLACEMENT  2/25/2022    IR PORT PLACEMENT  1/17/2022    IR TUNNELED CENTRAL LINE PLACEMENT  12/24/2020    JOINT REPLACEMENT      christofer knees replaced    KY AMPUTATION METATARSAL+TOE,SINGLE Left 12/21/2020    Procedure: RAY RESECTION FOOT;  Surgeon: Caro Servin DPM;  [facility];  Service: Podiatry    KY AMPUTATION METATARSAL+TOE,SINGLE Left 12/31/2020    Procedure: 5TH MET RESECTION;  Surgeon: Caro Servin DPM;  [facility];  Service: Podiatry    KY CYSTOURETHROSCOPY W/IRRIG & EVAC CLOTS N/A 2/10/2021    Procedure: CYSTOSCOPY EVACUATION OF CLOTS, fulguration;  Surgeon: Paul Calvillo MD;  [facility];  Service: Urology    MA CYSTOURETHROSCOPY W/IRRIG & EVAC CLOTS N/A 10/24/2021    Procedure: CYSTOSCOPY EVACUATION OF CLOT, fulguration of bleeding vessels, right ureter stent placement, retrograde pyelogram;  Surgeon: Janee Barnes MD;  [facility];  Service: Urology    MA CYSTOURETHROSCOPY,BIOPSY N/A 8/16/2016    Procedure: CYSTOSCOPY WITH BIOPSIES;  Surgeon: Micah Mora MD;  [facility];  Service: Urology    MA CYSTOURETHROSCOPY,FULGUR <0 5 CM LESN N/A 11/19/2020    Procedure: CYSTO W/BIOPSIES, transurethral prostate bx;  Surgeon: Tez Gudino MD;  [facility];  Service: Urology    MA CYSTOURETHROSCOPY,FULGUR >5 CM LESN Bilateral 10/18/2021    Procedure: TRANSURETHRAL RESECTION OF BLADDER TUMOR (TURBT);   Surgeon: Kojo Jaimes MD;  [facility];  Service: Urology    MA [address] [facility] 3RD+ ORD [address] PEL/TR [facility] Left 2/8/2021    Procedure: LEG angiogram, CO2 w/limited contrast with balloon angioplasty postertior tibial artery;  Surgeon: Yudelka Edmonds MD;  [facility];  Service: Vascular    ROTATOR CUFF REPAIR      SMALL INTESTINE SURGERY      Surgery Shaji-en-Y    SPINAL FUSION      lumbar and cervical fusions    VAC DRESSING APPLICATION Right 5/33/0470    Procedure: APPLICATION VAC DRESSING;  Surgeon: June Ramey DPM;  [facility];  Service:    [address] [place] [address] Left 2/16/2021    Procedure: FOOT [facility]IDE, [facility] OUT w/graft application;  Surgeon: June Ramey DPM;  [facility];  Service: Podiatry       Family History   Problem Relation Age of Onset    Diabetes Mother     Heart disease Mother     Other Mother         High blood pressure    Heart disease Father     Diabetes Sister     Other Sister         High blood pressure    Kidney disease Sister     Heart disease Brother     Other Brother         High blood pressure     I have reviewed and agree with the history as documented  E-Cigarette/Vaping    E-Cigarette Use Never User      E-Cigarette/Vaping Substances    Nicotine No     THC No     CBD No     Flavoring No     Other No     Unknown No      Social History     Tobacco Use    Smoking status: Former Smoker     Packs/day: 3 00     Years: 27 00     Pack years: 81 00     Types: Cigarettes     Quit date:      Years since quittin 3    Smokeless tobacco: Never Used   Vaping Use    Vaping Use: Never used   Substance Use Topics    Alcohol use: Never     Comment: beer / liquor    Drug use: Not Currently     Types: Marijuana     Comment: quit 2019 had medical marijuana       Review of Systems   Constitutional: Negative for appetite change, fatigue and fever  HENT: Negative for rhinorrhea and sore throat  Eyes: Negative for pain  Respiratory: Negative for cough, shortness of breath and wheezing  Cardiovascular: Negative for chest pain and leg swelling  Gastrointestinal: Negative for abdominal pain, diarrhea and vomiting  Genitourinary: Negative for dysuria and flank pain  Musculoskeletal: Negative for back pain and neck pain  Skin: Negative for rash  Neurological: Positive for syncope  Negative for headaches  Psychiatric/Behavioral:        Pt  Does report some depression with suicidal thoughts  No plan for suicide and states that he wouldn't act on it  Physical Exam  Physical Exam  Vitals and nursing note reviewed  Constitutional:       Appearance: He is well-developed  HENT:      Head: Normocephalic and atraumatic  Right Ear: External ear normal       Left Ear: External ear normal    Eyes:      General: No scleral icterus  Extraocular Movements: Extraocular movements intact  Cardiovascular:      Rate and Rhythm: Normal rate and regular rhythm  Pulmonary:      Effort: Pulmonary effort is normal  No respiratory distress  Breath sounds: Normal breath sounds     Abdominal:      Palpations: Abdomen is soft  Tenderness: There is no abdominal tenderness  Musculoskeletal:         General: No deformity or signs of injury  Normal range of motion  Cervical back: Normal range of motion and neck supple  Skin:     General: Skin is warm and dry  Coloration: Skin is not jaundiced or pale  Neurological:      General: No focal deficit present  Mental Status: He is alert and oriented to person, place, and time  Psychiatric:         Behavior: Behavior normal       Comments: Pt  Does report some depression with suicidal thoughts  No plan for suicide and states that he wouldn't act on it           Vital Signs  ED Triage Vitals [05/04/22 1159]   Temperature Pulse Respirations Blood Pressure SpO2   98 2 °F (36 8 °C) 56 18 130/68 98 %      Temp Source Heart Rate Source Patient Position - Orthostatic VS BP Location FiO2 (%)   Oral Monitor Lying Right arm --      Pain Score       No Pain           Vitals:    05/04/22 1159 05/04/22 1441 05/04/22 1600   BP: 130/68 116/66 157/93   Pulse: 56 59 61   Patient Position - Orthostatic VS: Lying Lying Lying         Visual Acuity      ED Medications  Medications   aspirin (ECOTRIN LOW STRENGTH) EC tablet 81 mg (0 mg Oral Hold 5/4/22 1656)   azelastine (ASTELIN) 0 1 % nasal spray 1 spray (has no administration in time range)   bimatoprost (LUMIGAN) 0 01 % ophthalmic solution 1 drop (has no administration in time range)   calcitriol (ROCALTROL) capsule 0 25 mcg (0 mcg Oral Hold 5/4/22 1657)   docusate sodium (COLACE) capsule 100 mg (has no administration in time range)   DULoxetine (CYMBALTA) delayed release capsule 20 mg (0 mg Oral Hold 5/4/22 1657)   ezetimibe (ZETIA) tablet 10 mg (has no administration in time range)   gabapentin (NEURONTIN) capsule 300 mg (has no administration in time range)   isosorbide mononitrate (IMDUR) 24 hr tablet 30 mg (has no administration in time range)   pantoprazole (PROTONIX) EC tablet 40 mg (has no administration in time range)   oxybutynin (DITROPAN-XL) 24 hr tablet 10 mg (0 mg Oral Hold 5/4/22 1657)   oxyCODONE (OxyCONTIN) 12 hr tablet 10 mg (has no administration in time range)   sertraline (ZOLOFT) tablet 100 mg (0 mg Oral Hold 5/4/22 1658)   insulin glargine (LANTUS) subcutaneous injection 18 Units 0 18 mL (has no administration in time range)   insulin lispro (HumaLOG) 100 units/mL subcutaneous injection 6 Units (has no administration in time range)   metoprolol succinate (TOPROL-XL) 24 hr tablet 100 mg (0 mg Oral Hold 5/4/22 1657)   sodium chloride 0 9 % infusion (0 mL/hr Intravenous Stopped 5/4/22 1659)   heparin (porcine) subcutaneous injection 5,000 Units (has no administration in time range)   sodium chloride 0 9 % bolus 500 mL (0 mL Intravenous Stopped 5/4/22 1345)       Diagnostic Studies  Results Reviewed     Procedure Component Value Units Date/Time    HS Troponin I 4hr [[id number]] Collected: 05/04/22 1714    Lab Status: In process Specimen: Blood from Arm, Right Updated: 05/04/22 1720    HS Troponin I 2hr [[id number]]  (Normal) Collected: 05/04/22 1441    Lab Status: Final result Specimen: Blood from Arm, Left Updated: 05/04/22 1510     hs TnI 2hr 9 ng/L      Delta 2hr hsTnI 0 ng/L     HS Troponin 0hr (reflex protocol) [[id number]]  (Normal) Collected: 05/04/22 1200    Lab Status: Final result Specimen: Blood from Arm, Left Updated: 05/04/22 1420     hs TnI 0hr 9 ng/L     TSH [[id number]]  (Normal) Collected: 05/04/22 1200    Lab Status: Final result Specimen: Blood from Arm, Left Updated: 05/04/22 1351     TSH 3RD GENERATON 2 247 uIU/mL     Narrative:      Patients undergoing fluorescein dye angiography may retain small amounts of fluorescein in the body for 48-72 hours post procedure  Samples containing fluorescein can produce falsely depressed TSH values  If the patient had this procedure,a specimen should be resubmitted post fluorescein clearance        NT-BNP PRO [[id number]]  (Abnormal) Collected: 05/04/22 1200 Lab Status: Final result Specimen: Blood from Arm, Left Updated: 05/04/22 1351     NT-proBNP 1,116 pg/mL     Comprehensive metabolic panel [[id number]]  (Abnormal) Collected: 05/04/22 1200    Lab Status: Final result Specimen: Blood from Arm, Left Updated: 05/04/22 1344     Sodium 139 mmol/L      Potassium 4 7 mmol/L      Chloride 106 mmol/L      CO2 25 mmol/L      ANION GAP 8 mmol/L      BUN 45 mg/dL      Creatinine 3 22 mg/dL      Glucose 96 mg/dL      Calcium 8 8 mg/dL      Corrected Calcium 9 5 mg/dL      AST 63 U/L      ALT 84 U/L      Alkaline Phosphatase 129 U/L      Total Protein 7 1 g/dL      Albumin 3 1 g/dL      Total Bilirubin 0 30 mg/dL      eGFR 17 ml/min/1 73sq m     Narrative:      National Kidney Disease Foundation guidelines for Chronic Kidney Disease (CKD):     Stage 1 with normal or high GFR (GFR > 90 mL/min/1 73 square meters)    Stage 2 Mild CKD (GFR = 60-89 mL/min/1 73 square meters)    Stage 3A Moderate CKD (GFR = 45-59 mL/min/1 73 square meters)    Stage 3B Moderate CKD (GFR = 30-44 mL/min/1 73 square meters)    Stage 4 Severe CKD (GFR = 15-29 mL/min/1 73 square meters)    Stage 5 End Stage CKD (GFR <15 mL/min/1 73 square meters)  Note: GFR calculation is accurate only with a steady state creatinine    D-Dimer [[id number]]  (Abnormal) Collected: 05/04/22 1200    Lab Status: Final result Specimen: Blood from Arm, Left Updated: 05/04/22 1316     D-Dimer, Quant 2 14 ug/ml FEU     Narrative: In the evaluation for possible pulmonary embolism, in the appropriate (Well's Score of 4 or less) patient, the age adjusted d-dimer cutoff for this patient can be calculated as:    Age x 0 01 (in ug/mL) for Age-adjusted D-dimer exclusion threshold for a patient over 50 years      Christoph Torres [[id number]]  (Normal) Collected: 05/04/22 1200    Lab Status: Final result Specimen: Blood from Arm, Left Updated: 05/04/22 1311     Protime 13 3 seconds      INR 1 04    APTT [[id number]]  (Normal) Collected: 05/04/22 1200    Lab Status: Final result Specimen: Blood from Arm, Left Updated: 05/04/22 1311     PTT 33 seconds     CBC and differential [[id number]]  (Abnormal) Collected: 05/04/22 1200    Lab Status: Final result Specimen: Blood from Arm, Left Updated: 05/04/22 1251     WBC 6 18 Thousand/uL      RBC 2 96 Million/uL      Hemoglobin 9 7 g/dL      Hematocrit 29 2 %      MCV 99 fL      MCH 32 8 pg      MCHC 33 2 g/dL      RDW 13 9 %      MPV 11 0 fL      Platelets 964 Thousands/uL      nRBC 0 /100 WBCs      Neutrophils Relative 59 %      Immat GRANS % 1 %      Lymphocytes Relative 25 %      Monocytes Relative 10 %      Eosinophils Relative 4 %      Basophils Relative 1 %      Neutrophils Absolute 3 69 Thousands/µL      Immature Grans Absolute 0 03 Thousand/uL      Lymphocytes Absolute 1 54 Thousands/µL      Monocytes Absolute 0 63 Thousand/µL      Eosinophils Absolute 0 25 Thousand/µL      Basophils Absolute 0 04 Thousands/µL                  CT head without contrast   Final Result by Ton Cruz MD (05/04 1424)      No acute intracranial hemorrhage seen   No mass effect or midline shift seen      No CT signs of acute territorial infarction               Workstation performed: [id number]         XR chest 1 view portable   Final Result by Gorge Weinberg MD (05/04 1411)      No acute cardiopulmonary disease                    Workstation performed: [id number]         Nuclear Medicine ED order    (Results Pending)              Procedures  Procedures         ED Course                                             MDM  Number of Diagnoses or Management Options     Amount and/or Complexity of Data Reviewed  Clinical lab tests: ordered and reviewed  Tests in the radiology section of CPT®: ordered and reviewed    Risk of Complications, Morbidity, and/or Mortality  Presenting problems: moderate  General comments: EKG shows nsr,  no ST elevation or depression  Patient was admitted to hospitalist for further workup/management  He is on all able to get a CT chest to rule out PE here because of his chronically elevated creatinine  I put in a consult for V/Q scan and an order to rule out PE  Hospitalist is aware    Psychiatry was also consulted for depression        Disposition  Final diagnoses:   Syncope   Depression     Time reflects when diagnosis was documented in both MDM as applicable and the Disposition within this note     Time User Action Codes Description Comment    5/4/2022  3:04 PM Ena MOORE Add [R55] Syncope     5/4/2022  3:16 PM Thiago Cook Add Yoel Keating  A] Depression       ED Disposition     ED Disposition Condition Date/Time Comment    Admit Stable Wed May 4, 2022  3:04 PM Case was discussed with RAHEL  and the patient's admission status was agreed to be obs /tele      Follow-up Information    None         Current Discharge Medication List      CONTINUE these medications which have NOT CHANGED    Details   Accu-Chek Softclix Lancets lancets Test blood sugar 4 times daily  Qty: 200 each, Refills: 0    Associated Diagnoses: Type 2 diabetes mellitus with stage 3 chronic kidney disease, with long-term current use of insulin, unspecified whether stage 3a or 3b CKD ([facility])      acetaminophen (TYLENOL) 325 mg tablet Take 650 mg by mouth every 6 (six) hours as needed for mild pain        ALPRAZolam (XANAX) 0 25 mg tablet At twice a day as needed for anxiety  Qty: 30 tablet, Refills: 0    Associated Diagnoses: Anxiety      aspirin (ECOTRIN LOW STRENGTH) 81 mg EC tablet Take 1 tablet (81 mg total) by mouth daily    Associated Diagnoses: Coronary artery disease of native artery of native heart with stable angina pectoris ([facility])      Azelastine HCl 0 15 % SOLN Inhale 1 spray 2 (two) times a day  Qty: 30 mL, Refills: 3    Associated Diagnoses: Chronic bronchitis, unspecified chronic bronchitis type ([facility])      Bimatoprost (LUMIGAN OP) Apply 1 drop to eye daily at bedtime       calcitriol (ROCALTROL) 0 25 mcg capsule Take 1 capsule (0 25 mcg total) by mouth daily  Qty: 90 capsule, Refills: 0    Associated Diagnoses: Vitamin D deficiency      docusate sodium (COLACE) 100 mg capsule Take 1 capsule (100 mg total) by mouth 2 (two) times a day  Qty: 60 capsule, Refills: 0    Associated Diagnoses: Bladder carcinoma ([address] );  Cancer related pain      DULoxetine (CYMBALTA) 20 mg capsule Take 1 capsule (20 mg total) by mouth daily  Qty: 90 capsule, Refills: 0    Associated Diagnoses: Degeneration of lumbar intervertebral disc; Lumbar spondylosis; Lumbar radiculopathy      ezetimibe (ZETIA) 10 mg tablet Take 1 tablet (10 mg total) by mouth daily in the early morning  Qty: 90 tablet, Refills: 0    Associated Diagnoses: Mixed hyperlipidemia      Ferrous Sulfate Dried (Feosol) 200 (65 Fe) MG TABS Take 65 mg by mouth daily  Qty: 90 tablet, Refills: 0    Associated Diagnoses: Iron deficiency anemia, unspecified iron deficiency anemia type      fluocinonide (LIDEX) 0 05 % cream Apply topically 2 (two) times a day  Qty: 30 g, Refills: 0    Associated Diagnoses: Herpes zoster without complication      fluticasone (FLONASE) 50 mcg/act nasal spray 1 spray into each nostril daily  Qty: 11 mL, Refills: 1    Associated Diagnoses: Seasonal allergies      furosemide (LASIX) 20 mg tablet TAKE 1 TABLET BY MOUTH AS NEEDED FOR EDEMA  Qty: 90 tablet, Refills: 1    Associated Diagnoses: Coronary artery disease involving native heart without angina pectoris, unspecified vessel or lesion type      gabapentin (Neurontin) 300 mg capsule Take 1 capsule (300 mg total) by mouth daily at bedtime  Qty: 30 capsule, Refills: 2    Associated Diagnoses: Type 2 diabetes mellitus with stage 3 chronic kidney disease, with long-term current use of insulin, unspecified whether stage 3a or 3b CKD ([facility])      glucose blood (Accu-Chek Martha Plus) test strip Test blood sugar 4 times daily  Qty: 200 strip, Refills: 0    Associated Diagnoses: Type 2 diabetes mellitus with stage 3 chronic kidney disease, with long-term current use of insulin, unspecified whether stage 3a or 3b CKD ([facility])      Insulin Pen Needle 31G X 8 MM MISC Inject 3 times a day  Qty: 100 each, Refills: 2    Associated Diagnoses: Type 2 diabetes mellitus with diabetic peripheral angiopathy without gangrene, with long-term current use of insulin ([facility])      isosorbide mononitrate (IMDUR) 30 mg 24 hr tablet Take 1 tablet (30 mg total) by mouth daily in the early morning  Qty: 90 tablet, Refills: 0    Associated Diagnoses: Coronary artery disease of native artery of native heart with stable angina pectoris ([facility])      Lantus SoloStar 100 units/mL injection pen 18 units qhs  Qty: 30 mL, Refills: 1    Comments: E11 22  Associated Diagnoses: Type 2 diabetes mellitus with complication, with long-term current use of insulin ([facility])      lidocaine (XYLOCAINE) 2 % topical gel Apply topically as needed for mild pain  Qty: 30 mL, Refills: 0    Associated Diagnoses: Malignant neoplasm of anterior wall of urinary bladder ([facility])      lidocaine-prilocaine (EMLA) cream Apply topically as needed for mild pain  Qty: 30 g, Refills: 0    Associated Diagnoses: Malignant neoplasm of anterior wall of urinary bladder ([facility])      loperamide (IMODIUM) 2 mg capsule Take 2 mg by mouth 4 (four) times a day as needed for diarrhea      metoprolol succinate (TOPROL-XL) 100 mg 24 hr tablet Take 1 tablet (100 mg total) by mouth daily for 7 days  Qty: 90 tablet, Refills: 0    Associated Diagnoses: Left carotid bruit      multivitamin (THERAGRAN) TABS Take 1 tablet by mouth daily  naloxone (NARCAN) 4 mg/0 1 mL nasal spray Administer 1 spray into a nostril  If no response after 2-3 minutes, give another dose in the other nostril using a new spray    Qty: 1 each, Refills: 1    Associated Diagnoses: Lumbar radiculopathy      NovoLOG FlexPen 100 units/mL injection pen 10 units with each meal   Qty: 5 pen, Refills: 1    Associated Diagnoses: Type 2 diabetes mellitus with complication (HCC)      omeprazole (PriLOSEC) 20 mg delayed release capsule Take 20 mg by mouth daily in the early morning PRN       oxybutynin (DITROPAN-XL) 10 MG 24 hr tablet Take 1 tablet (10 mg total) by mouth daily  Qty: 30 tablet, Refills: 0    Associated Diagnoses: Bilateral hydronephrosis      oxyCODONE (OxyCONTIN) 10 mg 12 hr tablet Take 10 mg by mouth every 12 (twelve) hours      sertraline (ZOLOFT) 100 mg tablet Take 1 tablet (100 mg total) by mouth daily  Qty: 90 tablet, Refills: 2    Associated Diagnoses: Reactive depression      !! sodium chloride, PF, 0 9 % 10 mL by Intracatheter route daily Intracatheter flushing daily  Qty: 900 mL, Refills: 0    Associated Diagnoses: Bladder cancer ([facility] [address] )      ! ! sodium chloride, PF, 0 9 % 10 mL by Intracatheter route daily Intracatheter flushing daily  Qty: 900 mL, Refills: 0    Associated Diagnoses: Bladder cancer ([facility] [address] )      ! ! sodium chloride, PF, 0 9 % 10 mL by Intracatheter route daily Bilateral PCNs to be flushed daily with prefilled 10cc NS  Qty: 600 mL, Refills: 3    Associated Diagnoses: Bilateral hydronephrosis       ! ! - Potential duplicate medications found  Please discuss with provider  No discharge procedures on file      PDMP Review       Value Time User    PDMP Reviewed  Yes 5/4/2022  3:52 PM Kate Molina MD          ED Provider  Electronically Signed by           Anton Martinez MD  05/04/22 [id number]

## 2022-05-04 NOTE — H&P
Mian 98 1945, 76 y o  male MRN: [id number]  Unit/Bed#: E4 -01 Encounter: [id number]  Primary Care Provider: Luanne Andrea MD   Date and time admitted to hospital: 5/4/2022 11:49 AM    * Syncope and collapse  Assessment & Plan  · Cardiac arrhythmia versus hypovolemia versus neurologic (sleep attack) versus polypharmacy (chronic opioid/anti depressant)  · He has known history of syncope and has had a cardiac workup in March 2021  He had a 14 day event recorder which showed no events  He does have sinus bradycardia on current EKG  · Syncope occurred while sitting down at the end of his visit with PCP  · He reported sleeping only 3 hours last night  · Well score 1 due to history of malignancy (low probability for PE)  · Observe on telemetry overnight  · Check orthostatic vital signs  · Hydrate  · Watch for recurrent symptoms    · Fall precaution    Depression with suicidal ideation  Assessment & Plan  · Patient reported suicidal thoughts with his PCP but no plan  · He also failed a depression screening in the ER  · Not actively suicidal at this time  · Psychiatric consult placed  CKD (chronic kidney disease) stage 4, GFR 15-29 ml/min ([facility])  Assessment & Plan  Creatinine is slightly above baseline  Will hydrate with normal saline 100 mL/hour  Repeat BMP in a m  Type 2 diabetes mellitus, with long-term current use of insulin ([facility])  Assessment & Plan  Lab Results   Component Value Date    HGBA1C 7 2 (A) 05/04/2022     Placed on Lantus 18 units at bedtime and Humalog 8 units with meals  Watch for hypoglycemia      Hypertension with renal disease  Assessment & Plan  Continue Toprol XL with hold parameters  Watch for bradycardia on telemetry    Hyperlipidemia  Assessment & Plan  Continues zetia 10 mg daily    Coronary artery disease of native artery of native heart with stable angina pectoris ([facility] [address] )  Assessment & Plan  Stable without ACS    Continue aspirin, metoprolol and isosorbide    VTE Prophylaxis: Heparin     Code Status:  Full code  POLST: There is no POLST form on file for this patient (pre-hospital)    Anticipated Length of Stay:  Patient will be admitted on an Observation basis with an anticipated length of stay of  less than 2 midnights  Justification for Hospital Stay:  Syncope    Total Time for Visit, including Counseling / Coordination of Care: 45 minutes  Greater than 50% of this total time spent on direct patient counseling and coordination of care  Chief Complaint:   Syncope    History of Present Illness:    Yancy Salinas is a 76 y o  male who presents with syncopal episode while at the doctor's office  He saw his PCP today and at the end of the visit he had a syncopal episode while sitting down  He had no witnessed seizure-like activity  His systolic blood pressure was noted to be low at 78  Blood sugar was 139 during the event  This was self-limited and he woke up with repeat blood pressure of 134/60  Ambulance was called and he was brought to the hospital   At the time of my examination he was awake and alert  He felt back to normal   He did admit that he slept only 3 hours last night  He recalled being with his PCP and denied any nausea, abdominal discomfort, headache or dizziness prior to passing out  On further review, while at the office, he admitted feeling depressed with suicidal thoughts but no plan  Per discussion with ER physician Dr Alan Priest, he also failed their depression screen  He has known history of syncope  He had cardiac workup in 2021 at [facility]   He had a 14 day event monitor in March 2021 which did not show any events  Review of Systems:    Review of Systems   Constitutional: Negative  HENT: Negative  Eyes: Negative  Respiratory: Negative  Cardiovascular: Negative  Gastrointestinal: Negative  Musculoskeletal: Negative  Skin: Negative      Neurological: See HPI   Psychiatric/Behavioral: Negative  All other systems reviewed and are negative  Past Medical and Surgical History:     Past Medical History:   Diagnosis Date    Anemia     Last assessed: 9/28/17    Anxiety     Arteriosclerotic cardiovascular disease     Last assessed: 9/28/17    Arthritis     Bladder cancer ([facility] [address] )     bladder- had BCG treatments    Chronic kidney disease     Stage IV    CKD (chronic kidney disease) stage 4, GFR 15-29 ml/min ([facility])     Colon polyp     Coronary artery disease     7 stents    Depression     Diabetes mellitus ([facility])     IDDM    GERD (gastroesophageal reflux disease)     Glaucoma     Hematuria     History of fusion of cervical spine     Hyperlipidemia     Hypertension     Insomnia     Last assessed: 11/14/12    Loss of hearing     has hearing aids but usually does not wear them    Other seasonal allergic rhinitis     Last assessed: 2/10/16    PAD (peripheral artery disease) ([facility])     Shortness of breath     on exertion    Spinal stenosis of lumbar region     Transient cerebral ischemia     No Residual    Uses walker     w/c for longer distances       Past Surgical History:   Procedure Laterality Date    CARDIAC SURGERY      Cath stent placement  Last assessed: 3/9/17  Interventional Catheterization    CHOLECYSTECTOMY      COLONOSCOPY      CYSTOSCOPY      Diagnostic w/biopsy  Jacki Hillman  Last assessed: 12/1/14    CYSTOSCOPY N/A 4/12/2022    Procedure: CYSTOSCOPY  Bladder biopsies  ;  Surgeon: Joel Herrera MD;  [facility];  Service: Urology    CYSTOSCOPY W/ RETROGRADES Right 3/1/2022    Procedure: CYSTO; stent removal retrograde;  Surgeon: Joel Herrera MD;  [facility];  Service: Urology    CYSTOSCOPY W/ URETERAL STENT PLACEMENT Bilateral 10/18/2021    Procedure: bilateral retrogrades, cytology collection;  Surgeon: Joel Herrera MD;  [facility];  Service: Urology    CYSTOURETHROSCOPY      w/cautery  Alexander Olivera    FL RETROGRADE PYELOGRAM  10/18/2021    FL RETROGRADE PYELOGRAM  10/24/2021    GASTRIC BYPASS      For morbid obesity w/Shaji-en-Y   Resolved: 11/17/09    INCISION AND DRAINAGE OF WOUND Right 2/26/2017    Procedure: INCISION AND DRAINAGE (I&D) EXTREMITY WITH APPLICATION OF GRAFT JACKET;  Surgeon: Corrie Edouard DPM;  [facility];  Service:     INCISION AND DRAINAGE OF WOUND Right 4/25/2017    Procedure: INCISION AND DRAINAGE (I&D) EXTREMITY, APPLICATION OF GRAFT;  Surgeon: Corrie Edouard DPM;  [facility];  Service:     IR BIOPSY OTHER  7/2/2020    IR LOWER EXTREMITY ANGIOGRAM  2/8/2021    IR LOWER EXTREMITY ANGIOGRAM  2/11/2021    IR NEPHROSTOMY TUBE CHECK/CHANGE/REPOSITION/REINSERTION/UPSIZE  4/28/2022    IR NEPHROSTOMY TUBE PLACEMENT  2/25/2022    IR PORT PLACEMENT  1/17/2022    IR TUNNELED CENTRAL LINE PLACEMENT  12/24/2020    JOINT REPLACEMENT      christofer knees replaced    UT AMPUTATION METATARSAL+TOE,SINGLE Left 12/21/2020    Procedure: RAY RESECTION FOOT;  Surgeon: Lucia Macdonald DPM;  [facility];  Service: Podiatry    UT AMPUTATION METATARSAL+TOE,SINGLE Left 12/31/2020    Procedure: 5TH MET RESECTION;  Surgeon: Lucia Macdonald DPM;  [facility];  Service: Podiatry    UT CYSTOURETHROSCOPY W/IRRIG & EVAC CLOTS N/A 2/10/2021    Procedure: CYSTOSCOPY EVACUATION OF CLOTS, fulguration;  Surgeon: Alex De Leon MD;  [facility];  Service: Urology    UT CYSTOURETHROSCOPY W/IRRIG & EVAC CLOTS N/A 10/24/2021    Procedure: CYSTOSCOPY EVACUATION OF CLOT, fulguration of bleeding vessels, right ureter stent placement, retrograde pyelogram;  Surgeon: Rain Gutierrez MD;  [facility];  Service: Urology    UT CYSTOURETHROSCOPY,BIOPSY N/A 8/16/2016    Procedure: CYSTOSCOPY WITH BIOPSIES;  Surgeon: Nathalie Hodgkins, MD;  [facility];  Service: Urology    UT CYSTOURETHROSCOPY,FULGUR <0 5 CM LESN N/A 11/19/2020    Procedure: CYSTO W/BIOPSIES, transurethral prostate bx;  Surgeon: Linnette Yuan MD;  [facility];  Service: Urology    VT CYSTOURETHROSCOPY,FULGUR >5 CM LESN Bilateral 10/18/2021    Procedure: TRANSURETHRAL RESECTION OF BLADDER TUMOR (TURBT); Surgeon: Fercho Hills MD;  [facility];  Service: Urology    [address] ORD SLCTV ABDL PEL/LXTR [facility] Left 2/8/2021    Procedure: LEG angiogram, CO2 w/limited contrast with balloon angioplasty postertior tibial artery;  Surgeon: Travis Flanagan MD;  [facility];  Service: Vascular    ROTATOR CUFF REPAIR      SMALL INTESTINE SURGERY      Surgery Shaji-en-Y    SPINAL FUSION      lumbar and cervical fusions    VAC DRESSING APPLICATION Right 4/22/2930    Procedure: APPLICATION VAC DRESSING;  Surgeon: Bright Raman DPM;  [facility];  Service:     WOUND DEBRIDEMENT Left 2/16/2021    Procedure: FOOT DEBRIDE, [address] OUT w/graft application;  Surgeon: Bright Raman DPM;  [facility];  Service: Podiatry       Meds/Allergies:    Prior to Admission medications    Medication Sig Start Date End Date Taking?  Authorizing Provider   Accu-Chek Softclix Lancets lancets Test blood sugar 4 times daily 2/23/22  Yes Michelet Wing MD   acetaminophen (TYLENOL) 325 mg tablet Take 650 mg by mouth every 6 (six) hours as needed for mild pain     Yes Historical Provider, MD   ALPRAZolam [facility]) 0 25 mg tablet At twice a day as needed for anxiety 9/29/21  Yes Michelet Wing MD   aspirin (ECOTRIN LOW STRENGTH) 81 mg EC tablet Take 1 tablet (81 mg total) by mouth daily 11/1/21  Yes Micheelt Wing MD   Azelastine HCl 0 15 % SOLN Inhale 1 spray 2 (two) times a day 5/14/20  Yes Michelet Wing MD   Bimatoprost (LUMIGAN OP) Apply 1 drop to eye daily at bedtime    Yes Historical Provider, MD   calcitriol (ROCALTROL) 0 25 mcg capsule Take 1 capsule (0 25 mcg total) by mouth daily 2/2/22  Yes Michelet Wing MD   docusate sodium (COLACE) 100 mg capsule Take 1 capsule (100 mg total) by mouth 2 (two) times a day 4/14/22  Yes Su Mora DO   DULoxetine (CYMBALTA) 20 mg capsule Take 1 capsule (20 mg total) by mouth daily 2/2/22  Yes Adams Boxer, MD   ezetimibe (ZETIA) 10 mg tablet Take 1 tablet (10 mg total) by mouth daily in the early morning 2/2/22  Yes Adams Boxer, MD   Ferrous Sulfate Dried (Feosol) 200 (65 Fe) MG TABS Take 65 mg by mouth daily 2/2/22  Yes Adams Boxer, MD   fluocinonide (LIDEX) 0 05 % cream Apply topically 2 (two) times a day  Patient taking differently: Apply topically as needed  5/18/21  Yes Adams Boxer, MD   fluticasone [facility]) 50 mcg/act nasal spray 1 spray into each nostril daily  Patient taking differently: 1 spray into each nostril as needed  9/29/21  Yes Di Pimentel MD   furosemide (LASIX) 20 mg tablet TAKE 1 TABLET BY MOUTH AS NEEDED FOR EDEMA 11/10/21  Yes Adams Boxer, MD   gabapentin (Neurontin) 300 mg capsule Take 1 capsule (300 mg total) by mouth daily at bedtime 2/23/22 5/24/22 Yes Adams Boxer, MD   glucose blood (Accu-Chek Martha Plus) test strip Test blood sugar 4 times daily 2/23/22  Yes Adams Boxer, MD   Insulin Pen Needle 31G X 8 MM MISC Inject 3 times a day 5/8/19  Yes Adams Boxer, MD   isosorbide mononitrate (IMDUR) 30 mg 24 hr tablet Take 1 tablet (30 mg total) by mouth daily in the early morning 2/2/22  Yes Adams Boxer, MD   Lantus SoloStar 100 units/mL injection pen 18 units qhs  Patient taking differently: Inject 18 Units under the skin daily at bedtime  6/24/21  Yes Andre Luo PA-C   lidocaine (XYLOCAINE) 2 % topical gel Apply topically as needed for mild pain 2/17/22  Yes Sruthi Arriaza MD   lidocaine-prilocaine (EMLA) cream Apply topically as needed for mild pain 1/10/22  Yes Gutierrez King MD   loperamide (IMODIUM) 2 mg capsule Take 2 mg by mouth 4 (four) times a day as needed for diarrhea   Yes Historical Provider, MD   metoprolol succinate (TOPROL-XL) 100 mg 24 hr tablet Take 1 tablet (100 mg total) by mouth daily for 7 days 12/13/21 5/4/22 Yes Fernando Fitzgerald MD   multivitamin [facility]) TABS Take 1 tablet by mouth daily  Yes Historical Provider, MD   naloxone (NARCAN) 4 mg/0 1 mL nasal spray Administer 1 spray into a nostril  If no response after 2-3 minutes, give another dose in the other nostril using a new spray   9/1/21  Yes Fernando Fitzgerald MD   NovoLOG FlexPen 100 units/mL injection pen 10 units with each meal   Patient taking differently: Inject 10 Units under the skin 3 (three) times a day with meals  2/5/21  Yes Vaishnavi Waterman,    omeprazole (PriLOSEC) 20 mg delayed release capsule Take 20 mg by mouth daily in the early morning PRN    Yes Historical Provider, MD   oxybutynin (DITROPAN-XL) 10 MG 24 hr tablet Take 1 tablet (10 mg total) by mouth daily 3/3/22  Yes Farhana Nichols DO   oxyCODONE (OxyCONTIN) 10 mg 12 hr tablet Take 10 mg by mouth every 12 (twelve) hours   Yes Historical Provider, MD   sertraline (ZOLOFT) 100 mg tablet Take 1 tablet (100 mg total) by mouth daily 5/4/22 8/2/22 Yes Fernando Fitzgerald MD   sodium chloride, PF, 0 9 % 10 mL by Intracatheter route daily Intracatheter flushing daily 2/25/22 5/26/22 Yes Jazzy Layne MD   sodium chloride, PF, 0 9 % 10 mL by Intracatheter route daily Intracatheter flushing daily 2/25/22 5/26/22 Yes Jazzy Layne MD   sodium chloride, PF, 0 9 % 10 mL by Intracatheter route daily Bilateral PCNs to be flushed daily with prefilled 10cc NS 4/19/22  Yes Jocy Montana PA-C   betamethasone valerate (VALISONE) 0 1 % cream Apply topically 2 (two) times a day 4/28/21 5/18/21  Fernando Fitzgerald MD   polyethylene glycol (MIRALAX) 17 g packet Take 17 g by mouth daily  Patient not taking: Reported on 5/4/2022  4/15/22 5/4/22  FarhanaDO bella Davis (SENOKOT) 8 6 MG tablet Take 1 tablet (8 6 mg total) by mouth daily  Patient not taking: Reported on 5/4/2022 3/25/22 5/4/22  April EVANGELINA Miller   sertraline (ZOLOFT) 50 mg tablet Take 1 tablet (50 mg total) by mouth daily 22  Qasim Franklin MD   tamsulosin [facility]) 0 4 mg Take 1 capsule (0 4 mg total) by mouth daily at bedtime 22  Qasim Franklin MD     I have reviewed home medications with a medical source (PCP, Pharmacy, other)  Allergies:    Allergies   Allergen Reactions    Atorvastatin Hives, Itching and Rash    Simvastatin Rash and Edema     Edema of lower legs    Statins Hives and Itching    Insulin Lispro Swelling and Edema     " Lower Legs"    Other Itching, Rash and Other (See Comments)     "EKG Patches"   "blue EKG patches"       Social History:     Marital Status: /Civil Union   Occupation:  Retired   Patient Pre-hospital Living Situation:  Lives with family  Patient Pre-hospital Level of Mobility:  Uses walker  Patient Pre-hospital Diet Restrictions:  None  Substance Use History:   Social History     Substance and Sexual Activity   Alcohol Use Never    Comment: beer / liquor     Social History     Tobacco Use   Smoking Status Former Smoker    Packs/day: 3 00    Years: 27 00    Pack years: 81 00    Types: Cigarettes    Quit date: 5    Years since quittin 3   Smokeless Tobacco Never Used     Social History     Substance and Sexual Activity   Drug Use Not Currently    Types: Marijuana    Comment: quit 2019 had medical marijuana       Family History:    Family History   Problem Relation Age of Onset    Diabetes Mother     Heart disease Mother     Other Mother         High blood pressure    Heart disease Father     Diabetes Sister     Other Sister         High blood pressure    Kidney disease Sister     Heart disease Brother     Other Brother         High blood pressure       Physical Exam:     Vitals:   Blood Pressure: 116/66 (22 1441)  Pulse: 59 (22 1441)  Temperature: 98 2 °F (36 8 °C) (22 1159)  Temp Source: Oral (05/04/22 1159)  Respirations: 18 (05/04/22 1441)  SpO2: 98 % (05/04/22 1441)    Physical Exam  Vitals reviewed  Constitutional:       General: He is not in acute distress  Appearance: He is not ill-appearing, toxic-appearing or diaphoretic  Comments: He appeared sleepy during the exam   HENT:      Head: Atraumatic  Nose: No rhinorrhea  Eyes:      General: No scleral icterus  Extraocular Movements: Extraocular movements intact  Conjunctiva/sclera: Conjunctivae normal       Pupils: Pupils are equal, round, and reactive to light  Neck:      Vascular: No carotid bruit  Cardiovascular:      Rate and Rhythm: Regular rhythm  Bradycardia present  Heart sounds: No murmur heard  No gallop  Pulmonary:      Effort: Pulmonary effort is normal  No respiratory distress  Breath sounds: Normal breath sounds  No wheezing or rales  Abdominal:      General: Abdomen is flat  There is no distension  Palpations: Abdomen is soft  Tenderness: There is no abdominal tenderness  Musculoskeletal:      Cervical back: Neck supple  No rigidity or tenderness  Right lower leg: No edema  Left lower leg: No edema  Lymphadenopathy:      Cervical: No cervical adenopathy  Skin:     General: Skin is warm and dry  Coloration: Skin is not jaundiced or pale  Neurological:      Mental Status: He is oriented to person, place, and time  Mental status is at baseline  Psychiatric:         Behavior: Behavior normal       Comments: Mood is depressed     Additional Data:     Lab Results: I have personally reviewed pertinent reports        Results from last 7 days   Lab Units 05/04/22  1200   WBC Thousand/uL 6 18   HEMOGLOBIN g/dL 9 7*   HEMATOCRIT % 29 2*   PLATELETS Thousands/uL 161   NEUTROS PCT % 59   LYMPHS PCT % 25   MONOS PCT % 10   EOS PCT % 4     Results from last 7 days   Lab Units 05/04/22  1200   SODIUM mmol/L 139   POTASSIUM mmol/L 4 7   CHLORIDE mmol/L 106   CO2 mmol/L 25 BUN mg/dL 45*   CREATININE mg/dL 3 22*   ANION GAP mmol/L 8   CALCIUM mg/dL 8 8   ALBUMIN g/dL 3 1*   TOTAL BILIRUBIN mg/dL 0 30   ALK PHOS U/L 129*   ALT U/L 84*   AST U/L 63*   GLUCOSE RANDOM mg/dL 96     Results from last 7 days   Lab Units 05/04/22  1200   INR  1 04     Results from last 7 days   Lab Units 04/28/22  1031   POC GLUCOSE mg/dl 125     Results from last 7 days   Lab Units 05/04/22  1058   HEMOGLOBIN A1C  7 2*           Imaging: I have personally reviewed pertinent reports  CT head without contrast   Final Result by Lorne Farrar MD (05/04 1424)      No acute intracranial hemorrhage seen   No mass effect or midline shift seen      No CT signs of acute territorial infarction               Workstation performed: [id number]         XR chest 1 view portable   Final Result by Sapna Colorado MD (05/04 1411)      No acute cardiopulmonary disease  Workstation performed: [id number]         Nuclear Medicine ED order    (Results Pending)       EKG, Pathology, and Other Studies Reviewed on Admission:   · EKG:  Sinus bradycardia    Allscripts / Epic Records Reviewed: Yes     ** Please Note: This note has been constructed using a voice recognition system   **

## 2022-05-05 ENCOUNTER — APPOINTMENT (OUTPATIENT)
Dept: NUCLEAR MEDICINE | Facility: HOSPITAL | Age: 77
End: 2022-05-05
Payer: MEDICARE

## 2022-05-05 VITALS
HEART RATE: 55 BPM | OXYGEN SATURATION: 100 % | DIASTOLIC BLOOD PRESSURE: 94 MMHG | RESPIRATION RATE: 18 BRPM | SYSTOLIC BLOOD PRESSURE: 140 MMHG | TEMPERATURE: 97 F

## 2022-05-05 LAB
ALBUMIN SERPL BCP-MCNC: 2.9 G/DL (ref 3.5–5)
ALP SERPL-CCNC: 125 U/L (ref 46–116)
ALT SERPL W P-5'-P-CCNC: 73 U/L (ref 12–78)
ANION GAP SERPL CALCULATED.3IONS-SCNC: 8 MMOL/L (ref 4–13)
AST SERPL W P-5'-P-CCNC: 46 U/L (ref 5–45)
BILIRUB SERPL-MCNC: 0.31 MG/DL (ref 0.2–1)
BUN SERPL-MCNC: 39 MG/DL (ref 5–25)
CALCIUM ALBUM COR SERPL-MCNC: 9.5 MG/DL (ref 8.3–10.1)
CALCIUM SERPL-MCNC: 8.6 MG/DL (ref 8.3–10.1)
CHLORIDE SERPL-SCNC: 104 MMOL/L (ref 100–108)
CO2 SERPL-SCNC: 27 MMOL/L (ref 21–32)
CREAT SERPL-MCNC: 2.79 MG/DL (ref 0.6–1.3)
ERYTHROCYTE [DISTWIDTH] IN BLOOD BY AUTOMATED COUNT: 13.8 % (ref 11.6–15.1)
GFR SERPL CREATININE-BSD FRML MDRD: 20 ML/MIN/1.73SQ M
GLUCOSE SERPL-MCNC: 110 MG/DL (ref 65–140)
HCT VFR BLD AUTO: 28.5 % (ref 36.5–49.3)
HGB BLD-MCNC: 9.6 G/DL (ref 12–17)
MCH RBC QN AUTO: 32.4 PG (ref 26.8–34.3)
MCHC RBC AUTO-ENTMCNC: 33.7 G/DL (ref 31.4–37.4)
MCV RBC AUTO: 96 FL (ref 82–98)
PLATELET # BLD AUTO: 156 THOUSANDS/UL (ref 149–390)
PMV BLD AUTO: 10 FL (ref 8.9–12.7)
POTASSIUM SERPL-SCNC: 4.6 MMOL/L (ref 3.5–5.3)
PROT SERPL-MCNC: 6.9 G/DL (ref 6.4–8.2)
RBC # BLD AUTO: 2.96 MILLION/UL (ref 3.88–5.62)
SODIUM SERPL-SCNC: 139 MMOL/L (ref 136–145)
WBC # BLD AUTO: 5.65 THOUSAND/UL (ref 4.31–10.16)

## 2022-05-05 PROCEDURE — 85027 COMPLETE CBC AUTOMATED: CPT | Performed by: INTERNAL MEDICINE

## 2022-05-05 PROCEDURE — 80053 COMPREHEN METABOLIC PANEL: CPT | Performed by: INTERNAL MEDICINE

## 2022-05-05 PROCEDURE — 99217 PR OBSERVATION CARE DISCHARGE MANAGEMENT: CPT | Performed by: STUDENT IN AN ORGANIZED HEALTH CARE EDUCATION/TRAINING PROGRAM

## 2022-05-05 PROCEDURE — A9540 TC99M MAA: HCPCS

## 2022-05-05 PROCEDURE — 78582 LUNG VENTILAT&PERFUS IMAGING: CPT

## 2022-05-05 PROCEDURE — A9558 XE133 XENON 10MCI: HCPCS

## 2022-05-05 RX ORDER — DULOXETIN HYDROCHLORIDE 30 MG/1
30 CAPSULE, DELAYED RELEASE ORAL DAILY
Qty: 30 CAPSULE | Refills: 0 | Status: SHIPPED | OUTPATIENT
Start: 2022-05-05 | End: 2022-05-10 | Stop reason: SDUPTHER

## 2022-05-05 RX ORDER — ARIPIPRAZOLE 2 MG/1
2 TABLET ORAL DAILY
Qty: 30 TABLET | Refills: 0 | Status: SHIPPED | OUTPATIENT
Start: 2022-05-06 | End: 2022-06-10 | Stop reason: SDUPTHER

## 2022-05-05 RX ORDER — ACETAMINOPHEN 325 MG/1
650 TABLET ORAL EVERY 6 HOURS PRN
Status: DISCONTINUED | OUTPATIENT
Start: 2022-05-05 | End: 2022-05-05 | Stop reason: HOSPADM

## 2022-05-05 RX ADMIN — OXYBUTYNIN CHLORIDE 10 MG: 10 TABLET, EXTENDED RELEASE ORAL at 08:10

## 2022-05-05 RX ADMIN — PANTOPRAZOLE SODIUM 40 MG: 40 TABLET, DELAYED RELEASE ORAL at 06:08

## 2022-05-05 RX ADMIN — AZELASTINE HYDROCHLORIDE 1 SPRAY: 137 SPRAY, METERED NASAL at 08:11

## 2022-05-05 RX ADMIN — CALCITRIOL CAPSULES 0.25 MCG 0.25 MCG: 0.25 CAPSULE ORAL at 08:10

## 2022-05-05 RX ADMIN — HEPARIN SODIUM 5000 UNITS: 5000 INJECTION INTRAVENOUS; SUBCUTANEOUS at 08:11

## 2022-05-05 RX ADMIN — METOPROLOL SUCCINATE 100 MG: 50 TABLET, EXTENDED RELEASE ORAL at 08:10

## 2022-05-05 RX ADMIN — ASPIRIN 81 MG: 81 TABLET, COATED ORAL at 08:11

## 2022-05-05 RX ADMIN — ISOSORBIDE MONONITRATE 30 MG: 30 TABLET, EXTENDED RELEASE ORAL at 06:08

## 2022-05-05 RX ADMIN — INSULIN LISPRO 6 UNITS: 100 INJECTION, SOLUTION INTRAVENOUS; SUBCUTANEOUS at 12:20

## 2022-05-05 RX ADMIN — INSULIN LISPRO 6 UNITS: 100 INJECTION, SOLUTION INTRAVENOUS; SUBCUTANEOUS at 08:11

## 2022-05-05 RX ADMIN — DOCUSATE SODIUM 100 MG: 100 CAPSULE, LIQUID FILLED ORAL at 08:10

## 2022-05-05 RX ADMIN — EZETIMIBE 10 MG: 10 TABLET ORAL at 06:08

## 2022-05-05 RX ADMIN — ACETAMINOPHEN 650 MG: 325 TABLET ORAL at 01:04

## 2022-05-05 RX ADMIN — ARIPIPRAZOLE 2 MG: 2 TABLET ORAL at 08:10

## 2022-05-05 RX ADMIN — OXYCODONE HYDROCHLORIDE 10 MG: 10 TABLET, FILM COATED, EXTENDED RELEASE ORAL at 08:11

## 2022-05-05 RX ADMIN — DULOXETINE HYDROCHLORIDE 30 MG: 30 CAPSULE, DELAYED RELEASE ORAL at 08:10

## 2022-05-05 NOTE — ASSESSMENT & PLAN NOTE
Continue Toprol XL 100mg, known hx of sinus bradycardia  EKG unchanged from prior  Continue follow up with primary cardiologist

## 2022-05-05 NOTE — PLAN OF CARE
Problem: MOBILITY - ADULT  Goal: Maintain or return to baseline ADL function  Description: INTERVENTIONS:  -  Assess patient's ability to carry out ADLs; assess patient's baseline for ADL function and identify physical deficits which impact ability to perform ADLs (bathing, care of mouth/teeth, toileting, grooming, dressing, etc )  - Assess/evaluate cause of self-care deficits   - Assess range of motion  - Assess patient's mobility; develop plan if impaired  - Assess patient's need for assistive devices and provide as appropriate  - Encourage maximum independence but intervene and supervise when necessary  - Involve family in performance of ADLs  - Assess for home care needs following discharge   - Consider OT consult to assist with ADL evaluation and planning for discharge  - Provide patient education as appropriate  5/4/2022 2035 by Juliann Quiñones RN  Outcome: Progressing  5/4/2022 2022 by Juliann Quiñones RN  Outcome: Progressing  Goal: Maintains/Returns to pre admission functional level  Description: INTERVENTIONS:  - Perform BMAT or MOVE assessment daily    - Set and communicate daily mobility goal to care team and patient/family/caregiver  - Collaborate with rehabilitation services on mobility goals if consulted  - Perform Range of Motion 3 times a day  - Reposition patient every 3 hours    - Dangle patient 3 times a day  - Stand patient 3 times a day  - Ambulate patient 3 times a day  - Out of bed to chair 3 times a day   - Out of bed for meals 3 times a day  - Out of bed for toileting  - Record patient progress and toleration of activity level   5/4/2022 2035 by Juliann Quiñones RN  Outcome: Progressing  5/4/2022 2022 by Juliann Quiñones RN  Outcome: Progressing     Problem: Potential for Falls  Goal: Patient will remain free of falls  Description: INTERVENTIONS:  - Educate patient/family on patient safety including physical limitations  - Instruct patient to call for assistance with activity   - Consult OT/PT to assist with strengthening/mobility   - Keep Call bell within reach  - Keep bed low and locked with side rails adjusted as appropriate  - Keep care items and personal belongings within reach  - Initiate and maintain comfort rounds  - Make Fall Risk Sign visible to staff  - Offer Toileting every 3 Hours, in advance of need  - Initiate/Maintain bed alarm  - Obtain necessary fall risk management equipment: alarm  - Apply yellow socks and bracelet for high fall risk patients  - Consider moving patient to room near nurses station  5/4/2022 2035 by Flory Rebolledo RN  Outcome: Progressing  5/4/2022 2022 by Flory Rebolledo RN  Outcome: Progressing     Problem: CARDIOVASCULAR - ADULT  Goal: Absence of cardiac dysrhythmias or at baseline rhythm  Description: INTERVENTIONS:  - Continuous cardiac monitoring, vital signs, obtain 12 lead EKG if ordered  - Administer antiarrhythmic and heart rate control medications as ordered  - Monitor electrolytes and administer replacement therapy as ordered  Outcome: Progressing     Problem: GENITOURINARY - ADULT  Goal: Maintains or returns to baseline urinary function  Description: INTERVENTIONS:  - Assess urinary function  - Encourage oral fluids to ensure adequate hydration if ordered  - Administer IV fluids as ordered to ensure adequate hydration  - Administer ordered medications as needed  - Offer frequent toileting  - Follow urinary retention protocol if ordered  Outcome: Progressing     Problem: METABOLIC, FLUID AND ELECTROLYTES - ADULT  Goal: Electrolytes maintained within normal limits  Description: INTERVENTIONS:  - Monitor labs and assess patient for signs and symptoms of electrolyte imbalances  - Administer electrolyte replacement as ordered  - Monitor response to electrolyte replacements, including repeat lab results as appropriate  - Instruct patient on fluid and nutrition as appropriate  Outcome: Progressing  Goal: Fluid balance maintained  Description: INTERVENTIONS:  - Monitor labs   - Monitor I/O and WT  - Instruct patient on fluid and nutrition as appropriate  - Assess for signs & symptoms of volume excess or deficit  Outcome: Progressing

## 2022-05-05 NOTE — DISCHARGE INSTRUCTIONS
Syncope   WHAT YOU NEED TO KNOW:   What is syncope? Syncope is also called fainting or passing out  Syncope is a sudden, temporary loss of consciousness, followed by a fall from a standing or sitting position  A syncope episode is usually short  What causes syncope? Syncope is caused by a decrease in blood flow to the brain  When blood flow to the brain decreases, oxygen to the brain also decreases  Any of the following conditions may cause syncope:  · A heart condition, such as a narrow artery or an irregular heartbeat    · A medical condition such as severe anemia, uncontrolled diabetes, or a nerve disorder    · Dehydration    · Certain medicines, such as blood pressure medicines, heart medicines, or antidepressants    · Problems with the blood vessels of your brain    · A rapid drop in blood pressure after a body position change, such as moving from lying to sitting or standing    · Straining during bowel movements, a cough or sneeze, or a stressful or fearful situation    · A medical condition that affects your lungs, such as pneumonia or asthma, or hyperventilation (breathing too quickly)    What signs and symptoms may occur before syncope? · Cold, clammy, and sweaty skin     · Fast breathing and a racing, pounding heartbeat     · Feeling more tired than usual     · Nausea, a warm feeling, and sweating    · A headache, or feeling lightheaded or dizzy    · Tingling sensation or numbness     · Spots in front of your eyes, blurred vision, or double vision    How is the cause of syncope diagnosed? Your healthcare provider will examine you  He or she will ask if you have other medical conditions  He or she may order the following tests to find out what is causing your symptoms:  · Blood tests  may be done to check your electrolyte levels, such as sodium  A problem with your electrolytes can lead to syncope  · Telemetry  is continuous monitoring of your heart rhythm   Sticky pads placed on your skin connect to an EKG machine that records your heart rhythm  · An echocardiogram  is a type of ultrasound  Sound waves are used to show the structure and function of your heart  · A stress test  may show the changes that take place in your heart while it is under stress  Stress may be placed on your heart with exercise or medicine  Ask for more information about this test     · A tilt table test  is used to check your heart and your blood pressure when you change positions  · A Holter monitor  is also called a portable electrocardiography (EKG) monitor  It shows your heart's electrical activity while you do your usual activities  The monitor is a small battery-operated device that you wear  It will show how fast your heart beats and if it beats in a regular pattern  Your healthcare provider may recommend this if you have syncope often over 2 to 3 days  How is syncope treated? Treatment depends on the cause of your syncope  To prevent syncope from happening again, you may  need any of the following:  · Medicines  may be needed to help your heart pump strongly and regularly  Your healthcare provider may also make changes to any medicines that are causing syncope  · Tilt training  involves training yourself to stand for 10 to 30 minutes each day against a wall  This helps your body decrease the effects of posture changes and reduces the number of fainting spells  What can I do to manage syncope? · Keep a record of your syncope episodes  Include your symptoms and your activity before and after the episode  The record can help your healthcare provider find the cause of your syncope and help you manage episodes  · Sit or lie down when needed  This includes when you feel dizzy, your throat is getting tight, and your vision changes  Raise your legs above the level of your heart  · Take slow, deep breaths if you start to breathe faster with anxiety or fear    This can help decrease dizziness and the feeling that you might faint  · Check your blood pressure often  This is important if you take medicine to lower your blood pressure  Check your blood pressure when you are lying down and when you are standing  Ask how often to check during the day  Keep a record of your blood pressure numbers  Your healthcare provider may use the record to help plan your treatment  What can I do to prevent a syncope episode? · Move slowly and let yourself get used to one position before you move to another position  This is very important when you change from a lying or sitting position to a standing position  Take some deep breaths before you stand up from a lying position  Stand up slowly  Sudden movements may cause a fainting spell  Sit on the side of the bed or couch for a few minutes before you stand up  If you are on bedrest, try to be upright for about 2 hours each day, or as directed  Do not lock your legs if you are standing for a long period of time  Move your legs and bend your knees to keep blood flowing  · Follow your healthcare provider's recommendations  Your provider may  recommend that you drink more liquids to prevent dehydration  You may also need to have more salt to keep your blood pressure from dropping too low and causing syncope  Your provider will tell you how much liquid and sodium to have each day  He or she will also tell you how much physical activity is safe for you  This will depend on what is causing your syncope  · Watch for signs of low blood sugar  These include hunger, nervousness, sweating, and fast or fluttery heartbeats  Talk with your healthcare provider about ways to keep your blood sugar level steady  · Do not strain if you are constipated  You may faint if you strain to have a bowel movement  Walking is the best way to get your bowels moving  Eat foods high in fiber to make it easier to have a bowel movement   Good examples are high-fiber cereals, beans, vegetables, and whole-grain breads  Prune juice may help make bowel movements softer  · Be careful in hot weather  Heat can cause a syncope episode  Limit activity done outside on hot days  Physical activity in hot weather can lead to dehydration  This can cause an episode  When should I seek immediate care? · You are bleeding because you hit your head when you fainted  · You suddenly have double vision, difficulty speaking, numbness, and cannot move your arms or legs  · You have chest pain and trouble breathing  · You vomit blood or material that looks like coffee grounds  · You see blood in your bowel movement  When should I contact my healthcare provider? · You have new or worsening symptoms  · You have another syncope episode  · You have a headache, fast heartbeat, or feel too dizzy to stand up  · You have questions or concerns about your condition or care  CARE AGREEMENT:   You have the right to help plan your care  Learn about your health condition and how it may be treated  Discuss treatment options with your healthcare providers to decide what care you want to receive  You always have the right to refuse treatment  The above information is an  only  It is not intended as medical advice for individual conditions or treatments  Talk to your doctor, nurse or pharmacist before following any medical regimen to see if it is safe and effective for you  © Copyright [employer] 2022 Information is for End User's use only and may not be sold, redistributed or otherwise used for commercial purposes   All illustrations and images included in CareNotes® are the copyrighted property of [employer]  or [address] [place] [address]

## 2022-05-05 NOTE — NURSING NOTE
Patient d/c to home, no needs  Reviewed possible causes for syncope and provided printed material  All questions/concerns addressed prior to d/c

## 2022-05-05 NOTE — ASSESSMENT & PLAN NOTE
· Cardiac arrhythmia versus hypovolemia versus neurologic (sleep attack) versus polypharmacy (chronic opioid/anti depressant)  · He has known history of syncope and has had a cardiac workup in March 2021  He had a 14 day event recorder which showed no events  He does have sinus bradycardia on current EKG  · Syncope occurred while sitting down at the end of his visit with PCP    · He reported sleeping only 3 hours last night  · No events on telemetry overnight  · Orthostatic vitals neg, patient was treated with IV fluids  · NM perfusion scan shows low probability for PE  · Discharged home, recommend to increase po fluid intake

## 2022-05-05 NOTE — DISCHARGE SUMMARY
[facility]  Discharge- Racheal Foss 1945, 76 y o  male MRN: [id number]  Unit/Bed#: E4 -01 Encounter: [id number]  Primary Care Provider: Sandra Mathew MD   Date and time admitted to hospital: 5/4/2022 11:49 AM    * Syncope and collapse  Assessment & Plan  · Cardiac arrhythmia versus hypovolemia versus neurologic (sleep attack) versus polypharmacy (chronic opioid/anti depressant)  · He has known history of syncope and has had a cardiac workup in March 2021  He had a 14 day event recorder which showed no events  He does have sinus bradycardia on current EKG  · Syncope occurred while sitting down at the end of his visit with PCP  · He reported sleeping only 3 hours last night  · No events on telemetry overnight  · Orthostatic vitals neg, patient was treated with IV fluids  · NM perfusion scan shows low probability for PE  · Discharged home, recommend to increase po fluid intake    Type 2 diabetes mellitus, with long-term current use of insulin [facility])  Assessment & Plan  Lab Results   Component Value Date    HGBA1C 7 2 (A) 05/04/2022     Continue home insulin regimen      Depression with suicidal ideation  Assessment & Plan  · Patient reported suicidal thoughts with his PCP but no plan  · He also failed a depression screening in the ER  · Not actively suicidal at this time  · Appreciate psych, no inpatient psych warranted  · Follow up with psychiatrist outpatient  · Increase Cymbalta from 20-30 mg, discontinue sertraline and add Abilify 2 mg at bedtime    CKD (chronic kidney disease) stage 4, GFR 15-29 ml/min ([facility])  Assessment & Plan  Creatinine is slightly above baseline    Improved, now at baseline with IV fluids    Hyperlipidemia  Assessment & Plan  Continues zetia 10 mg daily    Hypertension with renal disease  Assessment & Plan  Continue Toprol XL 100mg, known hx of sinus bradycardia  EKG unchanged from prior  Continue follow up with primary cardiologist    Coronary artery disease of native artery of native heart with stable angina pectoris [facility])  Assessment & Plan    Continue aspirin, metoprolol and isosorbide        Discharging Physician / Practitioner: Roberta Camilo MD  PCP: Sonja Meadows MD  Admission Date:   Admission Orders (From admission, onward)     Ordered        05/04/22 1504  Place in Observation  Once                      Discharge Date: 05/05/22    Medical Problems             Resolved Problems  Date Reviewed: 5/5/2022    None                Consultations During Hospital Stay:  · None    Procedures Performed:   · None    Significant Findings / Test Results:   XR chest 1 view portable    Result Date: 5/4/2022  Impression: No acute cardiopulmonary disease  Workstation performed: [id number]     CT head without contrast    Result Date: 5/4/2022  Impression: No acute intracranial hemorrhage seen No mass effect or midline shift seen No CT signs of acute territorial infarction Workstation performed: [id number]     NM lung ventilation / perfusion    Result Date: 5/5/2022  · Impression: The probability for pulmonary embolus is low  Workstation performed: [id number]   ·     Incidental Findings:   · None     Test Results Pending at Discharge (will require follow up): · None     Outpatient Tests Requested:  · None    Complications:  None    Reason for Admission: Syncope    Hospital Course:     Gerald Scherer is a 76 y o  male patient who originally presented to the hospital on 5/4/2022 due to syncope and collapse  Patient was monitored overnight  On telemetry did were no acute events, troponins were negative x3  His D-dimer was elevated at 2 and nuclear medicine scan showed low probability for PE, suspect likely elevated given history of bladder cancer  Patient was bradycardic, though EKGs reviewed and remain fairly unchanged from prior  Recommend patient follow-up with cardiology outpatient    He is on Toprol- mg once daily, blood pressure was well controlled, orthostatic vital signs were negative  Though blood pressure was in the 70s prior to admission per PCP note  IV fluids were given with slight improvement in renal functions and renal insufficiency  Of note, patient did have reported depression symptoms and had been following his PCP outpatient  Psych was consulted and recommended changing his sertraline to Abilify 2 mg at bedtime  Increase Cymbalta from 20-30 mg  Patient to follow-up with his PCP and/or psychiatrist outpatient  Patient denies any suicidal ideation  Please see above list of diagnoses and related plan for additional information  Condition at Discharge: fair     Discharge Day Visit / Exam:     Subjective:  Reports doing well  No complaints of CP  Vitals: Blood Pressure: 140/94 (05/05/22 0748)  Pulse: 55 (05/05/22 0748)  Temperature: (!) 97 °F (36 1 °C) (05/05/22 0748)  Temp Source: Temporal (05/05/22 0748)  Respirations: 18 (05/05/22 0748)  SpO2: 100 % (05/05/22 0748)  Exam:   Physical Exam  Vitals and nursing note reviewed  Constitutional:       Appearance: Normal appearance  He is normal weight  HENT:      Head: Normocephalic and atraumatic  Eyes:      General: No scleral icterus  Conjunctiva/sclera: Conjunctivae normal    Cardiovascular:      Rate and Rhythm: Regular rhythm  Bradycardia present  Heart sounds: Normal heart sounds  Pulmonary:      Effort: Pulmonary effort is normal  No respiratory distress  Breath sounds: Normal breath sounds  No wheezing  Abdominal:      General: Bowel sounds are normal  There is no distension  Palpations: Abdomen is soft  Tenderness: There is no abdominal tenderness  Genitourinary:     Comments: Nephrostomy tube  Musculoskeletal:         General: No swelling  Right lower leg: No edema  Left lower leg: No edema  Skin:     General: Skin is warm and dry  Neurological:      General: No focal deficit present  Mental Status: He is alert  Mental status is at baseline  Psychiatric:         Mood and Affect: Mood normal        Discussion with Family: patient    Discharge instructions/Information to patient and family:   See after visit summary for information provided to patient and family  Provisions for Follow-Up Care:  See after visit summary for information related to follow-up care and any pertinent home health orders  Disposition:     Home    Planned Readmission: None     Discharge Statement:  I spent 35 minutes discharging the patient  This time was spent on the day of discharge  I had direct contact with the patient on the day of discharge  Greater than 50% of the total time was spent examining patient, answering all patient questions, arranging and discussing plan of care with patient as well as directly providing post-discharge instructions  Additional time then spent on discharge activities  Discharge Medications:  See after visit summary for reconciled discharge medications provided to patient and family        ** Please Note: This note has been constructed using a voice recognition system **

## 2022-05-05 NOTE — ASSESSMENT & PLAN NOTE
· Patient reported suicidal thoughts with his PCP but no plan  · He also failed a depression screening in the ER  · Not actively suicidal at this time    · Appreciate psych, no inpatient psych warranted  · Follow up with psychiatrist outpatient  · Increase Cymbalta from 20-30 mg, discontinue sertraline and add Abilify 2 mg at bedtime

## 2022-05-05 NOTE — PLAN OF CARE
Problem: MOBILITY - ADULT  Goal: Maintain or return to baseline ADL function  Description: INTERVENTIONS:  -  Assess patient's ability to carry out ADLs; assess patient's baseline for ADL function and identify physical deficits which impact ability to perform ADLs (bathing, care of mouth/teeth, toileting, grooming, dressing, etc )  - Assess/evaluate cause of self-care deficits   - Assess range of motion  - Assess patient's mobility; develop plan if impaired  - Assess patient's need for assistive devices and provide as appropriate  - Encourage maximum independence but intervene and supervise when necessary  - Involve family in performance of ADLs  - Assess for home care needs following discharge   - Consider OT consult to assist with ADL evaluation and planning for discharge  - Provide patient education as appropriate  Outcome: Progressing     Problem: MOBILITY - ADULT  Goal: Maintains/Returns to pre admission functional level  Description: INTERVENTIONS:  - Perform BMAT or MOVE assessment daily    - Set and communicate daily mobility goal to care team and patient/family/caregiver  - Collaborate with rehabilitation services on mobility goals if consulted  - Perform Range of Motion 3 times a day  - Reposition patient every 2 hours    - Dangle patient 3 times a day  - Stand patient 3 times a day  - Ambulate patient 3 times a day  - Out of bed to chair 3 times a day   - Out of bed for meals 3 times a day  - Out of bed for toileting  - Record patient progress and toleration of activity level   Outcome: Progressing     Problem: Potential for Falls  Goal: Patient will remain free of falls  Description: INTERVENTIONS:  - Educate patient/family on patient safety including physical limitations  - Instruct patient to call for assistance with activity   - Consult OT/PT to assist with strengthening/mobility   - Keep Call bell within reach  - Keep bed low and locked with side rails adjusted as appropriate  - Keep care items and personal belongings within reach  - Initiate and maintain comfort rounds  - Make Fall Risk Sign visible to staff  - Offer Toileting every 2 Hours, in advance of need  - Initiate/Maintain bed/chair alarm  - Obtain necessary fall risk management equipment: cane  - Apply yellow socks and bracelet for high fall risk patients  - Consider moving patient to room near nurses station  Outcome: Progressing     Problem: CARDIOVASCULAR - ADULT  Goal: Absence of cardiac dysrhythmias or at baseline rhythm  Description: INTERVENTIONS:  - Continuous cardiac monitoring, vital signs, obtain 12 lead EKG if ordered  - Administer antiarrhythmic and heart rate control medications as ordered  - Monitor electrolytes and administer replacement therapy as ordered  Outcome: Progressing     Problem: GENITOURINARY - ADULT  Goal: Maintains or returns to baseline urinary function  Description: INTERVENTIONS:  - Assess urinary function  - Encourage oral fluids to ensure adequate hydration if ordered  - Administer IV fluids as ordered to ensure adequate hydration  - Administer ordered medications as needed  - Offer frequent toileting  - Follow urinary retention protocol if ordered  Outcome: Progressing     Problem: METABOLIC, FLUID AND ELECTROLYTES - ADULT  Goal: Electrolytes maintained within normal limits  Description: INTERVENTIONS:  - Monitor labs and assess patient for signs and symptoms of electrolyte imbalances  - Administer electrolyte replacement as ordered  - Monitor response to electrolyte replacements, including repeat lab results as appropriate  - Instruct patient on fluid and nutrition as appropriate  Outcome: Progressing     Problem: METABOLIC, FLUID AND ELECTROLYTES - ADULT  Goal: Fluid balance maintained  Description: INTERVENTIONS:  - Monitor labs   - Monitor I/O and WT  - Instruct patient on fluid and nutrition as appropriate  - Assess for signs & symptoms of volume excess or deficit  Outcome: Progressing     Problem: DISCHARGE PLANNING  Goal: Discharge to home or other facility with appropriate resources  Description: INTERVENTIONS:  - Identify barriers to discharge w/patient and caregiver  - Arrange for needed discharge resources and transportation as appropriate  - Identify discharge learning needs (meds, wound care, etc )  - Arrange for interpretive services to assist at discharge as needed  - Refer to Case Management Department for coordinating discharge planning if the patient needs post-hospital services based on physician/advanced practitioner order or complex needs related to functional status, cognitive ability, or social support system  Outcome: Progressing     Problem: Knowledge Deficit  Goal: Patient/family/caregiver demonstrates understanding of disease process, treatment plan, medications, and discharge instructions  Description: Complete learning assessment and assess knowledge base    Interventions:  - Provide teaching at level of understanding  - Provide teaching via preferred learning methods  Outcome: Progressing

## 2022-05-06 ENCOUNTER — HOSPITAL ENCOUNTER (OUTPATIENT)
Dept: INFUSION CENTER | Facility: HOSPITAL | Age: 77
Discharge: HOME | End: 2022-05-06

## 2022-05-06 ENCOUNTER — TELEPHONE (OUTPATIENT)
Dept: RADIOLOGY | Facility: HOSPITAL | Age: 77
End: 2022-05-06

## 2022-05-06 ENCOUNTER — TELEPHONE (OUTPATIENT)
Dept: OTHER | Facility: OTHER | Age: 77
End: 2022-05-06

## 2022-05-06 ENCOUNTER — TRANSITIONAL CARE MANAGEMENT (OUTPATIENT)
Dept: INTERNAL MEDICINE CLINIC | Facility: CLINIC | Age: 77
End: 2022-05-06

## 2022-05-06 ENCOUNTER — TELEPHONE (OUTPATIENT)
Dept: PSYCHIATRY | Facility: CLINIC | Age: 77
End: 2022-05-06

## 2022-05-06 NOTE — NURSING NOTE
Attempted to return phone call to patient at [phone]  Advised we would be happy to send a script in for flushes to a pharmacy, we however [facility] as these have been on back order and hard to come by  Patient's bags will be changed when he comes in for his tube exchanges and will be provided an extra incase of bag failure as previously discussed

## 2022-05-06 NOTE — TELEPHONE ENCOUNTER
Pt [facility] for [facility]  [facility] today at 4[facility] and can take script with him

## 2022-05-06 NOTE — TELEPHONE ENCOUNTER
Call placed to patient and spoke with him  Informed him that he will need to contact IR team in regards to nephrostomy tube and supplies as Dr Sharmila Silva does not manage this for the patient  Dr Sharmila Silva has not written a script for these supplies and this needs to come from the IR team who is managing this for patient  Pt is aware and will contact the IR team for further assistance

## 2022-05-06 NOTE — TELEPHONE ENCOUNTER
Uche Pak (Self) [phone] (M)     Is calling to inform the Clinical Team he will need   Catheter Supplies and will need an order sent to the [place]

## 2022-05-09 ENCOUNTER — TELEPHONE (OUTPATIENT)
Dept: UROLOGY | Facility: MEDICAL CENTER | Age: 77
End: 2022-05-09

## 2022-05-09 NOTE — TELEPHONE ENCOUNTER
----- Message from Bebe Perez MD sent at 5/9/2022  1:20 PM EDT -----  His PCP, from the [place], called me Friday night  Said patient + wife call him feeling somewhat helpless, in the dark, what to do about extra drainage bags for his bilateral percutaneous nephrostomy  Bandage on the tubes etc  Please call them and see what their issues are  If they need bags for perc nephrostomy tubes, it is probably a specific one that comes from IR

## 2022-05-09 NOTE — TELEPHONE ENCOUNTER
Eddie Buitrago RN       5/9/22 10:05 AM  Note  Summary: flushes/supplies    Received message from scheduling asking if Mr Fabi Matson flush script was written and placed at the   It has been there since Friday  They also state his bag is leaking, a bag will be placed at the  with his script to be picked up at their convience  According to patient's chart,  Radiology team did contact patient today to review concerns with catheter supplies for nephrostomy tube  Call placed to patient  He did not answer  LMOM asking patient to contact the office with any further questions or concerns he should have in regards to nephrostomy tube and supplies and if Urology team can help assist him  Office number was provided for the patient to call back and discuss

## 2022-05-10 DIAGNOSIS — R09.89 LEFT CAROTID BRUIT: ICD-10-CM

## 2022-05-10 DIAGNOSIS — M51.36 DEGENERATION OF LUMBAR INTERVERTEBRAL DISC: ICD-10-CM

## 2022-05-10 DIAGNOSIS — E78.2 MIXED HYPERLIPIDEMIA: ICD-10-CM

## 2022-05-10 DIAGNOSIS — N18.30 TYPE 2 DIABETES MELLITUS WITH STAGE 3 CHRONIC KIDNEY DISEASE, WITH LONG-TERM CURRENT USE OF INSULIN, UNSPECIFIED WHETHER STAGE 3A OR 3B CKD (HCC): ICD-10-CM

## 2022-05-10 DIAGNOSIS — M54.16 LUMBAR RADICULOPATHY: ICD-10-CM

## 2022-05-10 DIAGNOSIS — I25.118 CORONARY ARTERY DISEASE OF NATIVE ARTERY OF NATIVE HEART WITH STABLE ANGINA PECTORIS (HCC): ICD-10-CM

## 2022-05-10 DIAGNOSIS — I25.10 CORONARY ARTERY DISEASE INVOLVING NATIVE HEART WITHOUT ANGINA PECTORIS, UNSPECIFIED VESSEL OR LESION TYPE: ICD-10-CM

## 2022-05-10 DIAGNOSIS — M47.816 LUMBAR SPONDYLOSIS: ICD-10-CM

## 2022-05-10 DIAGNOSIS — N13.30 BILATERAL HYDRONEPHROSIS: ICD-10-CM

## 2022-05-10 DIAGNOSIS — E11.22 TYPE 2 DIABETES MELLITUS WITH STAGE 3 CHRONIC KIDNEY DISEASE, WITH LONG-TERM CURRENT USE OF INSULIN, UNSPECIFIED WHETHER STAGE 3A OR 3B CKD (HCC): ICD-10-CM

## 2022-05-10 DIAGNOSIS — Z79.4 TYPE 2 DIABETES MELLITUS WITH STAGE 3 CHRONIC KIDNEY DISEASE, WITH LONG-TERM CURRENT USE OF INSULIN, UNSPECIFIED WHETHER STAGE 3A OR 3B CKD (HCC): ICD-10-CM

## 2022-05-10 RX ORDER — FUROSEMIDE 20 MG/1
20 TABLET ORAL DAILY
Qty: 90 TABLET | Refills: 1 | Status: SHIPPED | OUTPATIENT
Start: 2022-05-10

## 2022-05-10 RX ORDER — DULOXETIN HYDROCHLORIDE 30 MG/1
30 CAPSULE, DELAYED RELEASE ORAL DAILY
Qty: 30 CAPSULE | Refills: 0 | Status: SHIPPED | OUTPATIENT
Start: 2022-05-10 | End: 2022-07-08 | Stop reason: SDUPTHER

## 2022-05-10 RX ORDER — ISOSORBIDE MONONITRATE 30 MG/1
30 TABLET, EXTENDED RELEASE ORAL
Qty: 90 TABLET | Refills: 0 | Status: SHIPPED | OUTPATIENT
Start: 2022-05-10

## 2022-05-10 RX ORDER — METOPROLOL SUCCINATE 100 MG/1
100 TABLET, EXTENDED RELEASE ORAL DAILY
Qty: 90 TABLET | Refills: 0 | Status: SHIPPED | OUTPATIENT
Start: 2022-05-10 | End: 2022-07-14

## 2022-05-10 RX ORDER — GABAPENTIN 300 MG/1
300 CAPSULE ORAL
Qty: 30 CAPSULE | Refills: 2 | Status: SHIPPED | OUTPATIENT
Start: 2022-05-10 | End: 2022-07-08 | Stop reason: SDUPTHER

## 2022-05-10 RX ORDER — OXYBUTYNIN CHLORIDE 10 MG/1
10 TABLET, EXTENDED RELEASE ORAL DAILY
Qty: 30 TABLET | Refills: 0 | Status: SHIPPED | OUTPATIENT
Start: 2022-05-10

## 2022-05-10 RX ORDER — EZETIMIBE 10 MG/1
10 TABLET ORAL
Qty: 90 TABLET | Refills: 0 | Status: SHIPPED | OUTPATIENT
Start: 2022-05-10

## 2022-05-10 NOTE — TELEPHONE ENCOUNTER
Called patient to make sure everything was taken care of regarding his supplies  He stated that everything was and he had no further concerns or questions at this time

## 2022-05-13 ENCOUNTER — OFFICE VISIT (OUTPATIENT)
Dept: INTERNAL MEDICINE CLINIC | Facility: CLINIC | Age: 77
End: 2022-05-13
Payer: MEDICARE

## 2022-05-13 VITALS
SYSTOLIC BLOOD PRESSURE: 114 MMHG | DIASTOLIC BLOOD PRESSURE: 66 MMHG | TEMPERATURE: 97.5 F | HEART RATE: 62 BPM | BODY MASS INDEX: 30.07 KG/M2 | RESPIRATION RATE: 18 BRPM | WEIGHT: 222 LBS | HEIGHT: 72 IN | OXYGEN SATURATION: 98 %

## 2022-05-13 DIAGNOSIS — I70.209 ARTERIOSCLEROSIS OF ARTERY OF EXTREMITY (HCC): ICD-10-CM

## 2022-05-13 DIAGNOSIS — Z79.4 TYPE 2 DIABETES MELLITUS WITH STAGE 3 CHRONIC KIDNEY DISEASE, WITH LONG-TERM CURRENT USE OF INSULIN, UNSPECIFIED WHETHER STAGE 3A OR 3B CKD (HCC): ICD-10-CM

## 2022-05-13 DIAGNOSIS — E11.22 TYPE 2 DIABETES MELLITUS WITH STAGE 3 CHRONIC KIDNEY DISEASE, WITH LONG-TERM CURRENT USE OF INSULIN, UNSPECIFIED WHETHER STAGE 3A OR 3B CKD (HCC): ICD-10-CM

## 2022-05-13 DIAGNOSIS — L97.426 DIABETIC ULCER OF LEFT MIDFOOT ASSOCIATED WITH TYPE 2 DIABETES MELLITUS, WITH BONE INVOLVEMENT WITHOUT EVIDENCE OF NECROSIS (HCC): ICD-10-CM

## 2022-05-13 DIAGNOSIS — Z96.0 RETAINED URETERAL STENT: Chronic | ICD-10-CM

## 2022-05-13 DIAGNOSIS — I45.9 STOKES-ADAMS SYNCOPE: ICD-10-CM

## 2022-05-13 DIAGNOSIS — G89.4 CHRONIC PAIN SYNDROME: ICD-10-CM

## 2022-05-13 DIAGNOSIS — R55 SYNCOPE AND COLLAPSE: ICD-10-CM

## 2022-05-13 DIAGNOSIS — E78.2 MIXED HYPERLIPIDEMIA: ICD-10-CM

## 2022-05-13 DIAGNOSIS — C67.9 BLADDER CARCINOMA (HCC): ICD-10-CM

## 2022-05-13 DIAGNOSIS — N18.30 TYPE 2 DIABETES MELLITUS WITH STAGE 3 CHRONIC KIDNEY DISEASE, WITH LONG-TERM CURRENT USE OF INSULIN, UNSPECIFIED WHETHER STAGE 3A OR 3B CKD (HCC): ICD-10-CM

## 2022-05-13 DIAGNOSIS — I12.9 HYPERTENSION WITH RENAL DISEASE: ICD-10-CM

## 2022-05-13 DIAGNOSIS — I25.118 CORONARY ARTERY DISEASE OF NATIVE ARTERY OF NATIVE HEART WITH STABLE ANGINA PECTORIS (HCC): Primary | ICD-10-CM

## 2022-05-13 DIAGNOSIS — F32.1 MODERATE MAJOR DEPRESSION, SINGLE EPISODE (HCC): ICD-10-CM

## 2022-05-13 DIAGNOSIS — E11.621 DIABETIC ULCER OF LEFT MIDFOOT ASSOCIATED WITH TYPE 2 DIABETES MELLITUS, WITH BONE INVOLVEMENT WITHOUT EVIDENCE OF NECROSIS (HCC): ICD-10-CM

## 2022-05-13 PROCEDURE — 99495 TRANSJ CARE MGMT MOD F2F 14D: CPT | Performed by: INTERNAL MEDICINE

## 2022-05-13 NOTE — PROGRESS NOTES
Assessment/Plan:        Was admitted to [facility] with syncope and collapse from May 4 to May 5th  The syncope happened in our office here when he was leaving he fell anxious and when out on August in occur for couple of minutes he woke up again we did call 911 to go to the hospital because he has a cardiac history in the hospital monitor was unremarkable  Known history of syncope since March of 2021 he needs to follow-up with Cardiology today he feels well he is awake is walking blood pressure is control there is Mikal our 0 stasis I got a blood pressure    110/60    No change with standing on the left arm    2  Diabetes fairly well control no polyuria polydipsia he has had no episodes of hypoglycemia hemoglobin A1c 7 2    3  Depression he was suicidal thoughts when he was here in the office a week ago he denies any suicidal thoughts he does not appear depressed now psychiatrist saw him in the hospital he stop the Zoloft and increase the Cymbalta from 20-30 mg which she is taking and he is started on Abilify 2 mg at bedtime he is doing well continue with psychiatric follow-up    Chronic renal failure improved with IV fluids he was taking the Lasix 20 every other day I told her to cut down the Lasix as lungs is not short of breath and does not have edema in the lower extremity to monitor his weight maybe he can only take Lasix Monday Wednesday and Friday weekly or twice a week      Blood pressure is well control he is on Toprol 100 mg per day    Coronary artery disease stable no angina    He had MRI of the lung no was a low probability for pulmonary embolism    CT scan of the brain showed no acute intracranial hemorrhage no mass or midline shift    Chest x-ray showed no acute pulmonary disease    Labs review    Results for orders placed or performed during the hospital encounter of 05/04/22   CBC and differential   Result Value Ref Range    WBC 6 18 4 31 - 10 16 Thousand/uL    RBC 2 96 (L) 3 88 - 5 62 Million/uL    Hemoglobin 9 7 (L) 12 0 - 17 0 g/dL    Hematocrit 29 2 (L) 36 5 - 49 3 %    MCV 99 (H) 82 - 98 fL    MCH 32 8 26 8 - 34 3 pg    MCHC 33 2 31 4 - 37 4 g/dL    RDW 13 9 11 6 - 15 1 %    MPV 11 0 8 9 - 12 7 fL    Platelets 583 333 - 945 Thousands/uL    nRBC 0 /100 WBCs    Neutrophils Relative 59 43 - 75 %    Immat GRANS % 1 0 - 2 %    Lymphocytes Relative 25 14 - 44 %    Monocytes Relative 10 4 - 12 %    Eosinophils Relative 4 0 - 6 %    Basophils Relative 1 0 - 1 %    Neutrophils Absolute 3 69 1 85 - 7 62 Thousands/µL    Immature Grans Absolute 0 03 0 00 - 0 20 Thousand/uL    Lymphocytes Absolute 1 54 0 60 - 4 47 Thousands/µL    Monocytes Absolute 0 63 0 17 - 1 22 Thousand/µL    Eosinophils Absolute 0 25 0 00 - 0 61 Thousand/µL    Basophils Absolute 0 04 0 00 - 0 10 Thousands/µL   Protime-INR   Result Value Ref Range    Protime 13 3 11 6 - 14 5 seconds    INR 1 04 0 84 - 1 19   APTT   Result Value Ref Range    PTT 33 23 - 37 seconds   Comprehensive metabolic panel   Result Value Ref Range    Sodium 139 136 - 145 mmol/L    Potassium 4 7 3 5 - 5 3 mmol/L    Chloride 106 100 - 108 mmol/L    CO2 25 21 - 32 mmol/L    ANION GAP 8 4 - 13 mmol/L    BUN 45 (H) 5 - 25 mg/dL    Creatinine 3 22 (H) 0 60 - 1 30 mg/dL    Glucose 96 65 - 140 mg/dL    Calcium 8 8 8 3 - 10 1 mg/dL    Corrected Calcium 9 5 8 3 - 10 1 mg/dL    AST 63 (H) 5 - 45 U/L    ALT 84 (H) 12 - 78 U/L    Alkaline Phosphatase 129 (H) 46 - 116 U/L    Total Protein 7 1 6 4 - 8 2 g/dL    Albumin 3 1 (L) 3 5 - 5 0 g/dL    Total Bilirubin 0 30 0 20 - 1 00 mg/dL    eGFR 17 ml/min/1 73sq m   TSH   Result Value Ref Range    TSH 3RD GENERATON 2 247 0 450 - 4 500 uIU/mL   NT-BNP PRO   Result Value Ref Range    NT-proBNP 1,116 (H) <450 pg/mL   HS Troponin 0hr (reflex protocol)   Result Value Ref Range    hs TnI 0hr 9 "Refer to ACS Flowchart"- see link ng/L   D-Dimer   Result Value Ref Range    D-Dimer, Quant 2 14 (H) <0 50 ug/ml FEU   HS Troponin I 2hr   Result Value Ref Range    hs TnI 2hr 9 "Refer to ACS Flowchart"- see link ng/L    Delta 2hr hsTnI 0 <20 ng/L   HS Troponin I 4hr   Result Value Ref Range    hs TnI 4hr 10 "Refer to ACS Flowchart"- see link ng/L    Delta 4hr hsTnI 1 <20 ng/L   Comprehensive metabolic panel   Result Value Ref Range    Sodium 139 136 - 145 mmol/L    Potassium 4 6 3 5 - 5 3 mmol/L    Chloride 104 100 - 108 mmol/L    CO2 27 21 - 32 mmol/L    ANION GAP 8 4 - 13 mmol/L    BUN 39 (H) 5 - 25 mg/dL    Creatinine 2 79 (H) 0 60 - 1 30 mg/dL    Glucose 110 65 - 140 mg/dL    Calcium 8 6 8 3 - 10 1 mg/dL    Corrected Calcium 9 5 8 3 - 10 1 mg/dL    AST 46 (H) 5 - 45 U/L    ALT 73 12 - 78 U/L    Alkaline Phosphatase 125 (H) 46 - 116 U/L    Total Protein 6 9 6 4 - 8 2 g/dL    Albumin 2 9 (L) 3 5 - 5 0 g/dL    Total Bilirubin 0 31 0 20 - 1 00 mg/dL    eGFR 20 ml/min/1 73sq m   CBC (With Platelets)   Result Value Ref Range    WBC 5 65 4 31 - 10 16 Thousand/uL    RBC 2 96 (L) 3 88 - 5 62 Million/uL    Hemoglobin 9 6 (L) 12 0 - 17 0 g/dL    Hematocrit 28 5 (L) 36 5 - 49 3 %    MCV 96 82 - 98 fL    MCH 32 4 26 8 - 34 3 pg    MCHC 33 7 31 4 - 37 4 g/dL    RDW 13 8 11 6 - 15 1 %    Platelets 044 604 - 669 Thousands/uL    MPV 10 0 8 9 - 12 7 fL   ECG 12 lead   Result Value Ref Range    Ventricular Rate 53 BPM    Atrial Rate 53 BPM    TX Interval 224 ms    QRSD Interval 104 ms    QT Interval 440 ms    QTC Interval 412 ms    P [place] 63 degrees    QRS Axis 10 degrees    T Wave Axis 20 degrees   ECG 12 lead   Result Value Ref Range    Ventricular Rate 53 BPM    Atrial Rate 53 BPM    TX Interval 232 ms    QRSD Interval 106 ms    QT Interval 432 ms    QTC Interval 405 ms    P Axis 55 degrees    QRS Axis 13 degrees    T Wave Axis 18 degrees   ECG 12 lead   Result Value Ref Range    Ventricular Rate 58 BPM    Atrial Rate 58 BPM    TX Interval 204 ms    QRSD Interval 106 ms    QT Interval 430 ms    QTC Interval 422 ms    P [place] 54 degrees    QRS Axis -3 degrees    T Wave Axis 0 degrees     *Note: Due to a large number of results and/or encounters for the requested time period, some results have not been displayed  A complete set of results can be found in Results Review  TCM Call (since 4/12/2022)     Date and time call was made  5/6/2022  2:36 PM    Patient was hospitialized at  [address]    Date of Admission  05/04/22    Date of discharge  05/05/22    Diagnosis  syncope & collapse    Disposition  Home    Were the patients medications reviewed and updated  Yes    Current Symptoms  None      TCM Call (since 4/12/2022)     Should patient be enrolled in anticoag monitoring? No    Scheduled for follow up? Yes    Did you obtain your prescribed medications  Yes    Do you need help managing your prescriptions or medications  No    Is transportation to your appointment needed  No    I have advised the patient to call PCP with any new or worsening symptoms  Howie Maldonado MA          No problem-specific Assessment & Plan notes found for this encounter  Diagnoses and all orders for this visit:    Coronary artery disease of native artery of native heart with stable angina pectoris ([facility] [address] )    Hypertension with renal disease    Diabetic ulcer of left midfoot associated with type 2 diabetes mellitus, 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 ([facility] [address] )    Type 2 diabetes mellitus with stage 3 chronic kidney disease, with long-term current use of insulin, unspecified whether stage 3a or 3b CKD ([address] )    Arteriosclerosis of artery of extremity ([facility] [address] )    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    Bladder carcinoma ([facility] [address] )    Mixed hyperlipidemia    Retained ureteral stent    Moderate major depression, single episode ([facility])    Chronic pain syndrome    Syncope and collapse          Subjective:      Patient ID: Stevo Serrano is a 76 y o  male  No chief complaint on file          Current Outpatient Medications:     Accu-Chek Softclix Lancets lancets, Test blood sugar 4 times daily, Disp: 200 each, Rfl: 0    acetaminophen (TYLENOL) 325 mg tablet, Take 650 mg by mouth every 6 (six) hours as needed for mild pain  , Disp: , Rfl:     ALPRAZolam (XANAX) 0 25 mg tablet, At twice a day as needed for anxiety, Disp: 30 tablet, Rfl: 0    ARIPiprazole (ABILIFY) 2 mg tablet, Take 1 tablet (2 mg total) by mouth daily, Disp: 30 tablet, Rfl: 0    aspirin (ECOTRIN LOW STRENGTH) 81 mg EC tablet, Take 1 tablet (81 mg total) by mouth daily, Disp: , Rfl:     Azelastine HCl 0 15 % SOLN, Inhale 1 spray 2 (two) times a day, Disp: 30 mL, Rfl: 3    Bimatoprost (LUMIGAN OP), Apply 1 drop to eye daily at bedtime , Disp: , Rfl:     calcitriol (ROCALTROL) 0 25 mcg capsule, Take 1 capsule (0 25 mcg total) by mouth daily, Disp: 90 capsule, Rfl: 0    docusate sodium (COLACE) 100 mg capsule, Take 1 capsule (100 mg total) by mouth 2 (two) times a day, Disp: 60 capsule, Rfl: 0    DULoxetine (CYMBALTA) 30 mg delayed release capsule, Take 1 capsule (30 mg total) by mouth daily, Disp: 30 capsule, Rfl: 0    ezetimibe (ZETIA) 10 mg tablet, Take 1 tablet (10 mg total) by mouth daily in the early morning, Disp: 90 tablet, Rfl: 0    Ferrous Sulfate Dried (Feosol) 200 (65 Fe) MG TABS, Take 65 mg by mouth daily, Disp: 90 tablet, Rfl: 0    fluocinonide (LIDEX) 0 05 % cream, Apply topically 2 (two) times a day (Patient taking differently: Apply topically as needed ), Disp: 30 g, Rfl: 0    fluticasone (FLONASE) 50 mcg/act nasal spray, 1 spray into each nostril daily (Patient taking differently: 1 spray into each nostril as needed ), Disp: 11 mL, Rfl: 1    furosemide (LASIX) 20 mg tablet, Take 1 tablet (20 mg total) by mouth daily, Disp: 90 tablet, Rfl: 1    gabapentin (Neurontin) 300 mg capsule, Take 1 capsule (300 mg total) by mouth daily at bedtime, Disp: 30 capsule, Rfl: 2    glucose blood (Accu-Chek Martha Plus) test strip, Test blood sugar 4 times daily, Disp: 200 strip, Rfl: 0    Insulin Pen Needle 31G X 8 MM MISC, Inject 3 times a day, Disp: 100 each, Rfl: 2    isosorbide mononitrate (IMDUR) 30 mg 24 hr tablet, Take 1 tablet (30 mg total) by mouth daily in the early morning, Disp: 90 tablet, Rfl: 0    Lantus SoloStar 100 units/mL injection pen, 18 units qhs (Patient taking differently: Inject 18 Units under the skin daily at bedtime ), Disp: 30 mL, Rfl: 1    lidocaine (XYLOCAINE) 2 % topical gel, Apply topically as needed for mild pain, Disp: 30 mL, Rfl: 0    lidocaine-prilocaine (EMLA) cream, Apply topically as needed for mild pain, Disp: 30 g, Rfl: 0    loperamide (IMODIUM) 2 mg capsule, Take 2 mg by mouth 4 (four) times a day as needed for diarrhea, Disp: , Rfl:     metoprolol succinate (TOPROL-XL) 100 mg 24 hr tablet, Take 1 tablet (100 mg total) by mouth daily for 7 days, Disp: 90 tablet, Rfl: 0    multivitamin (THERAGRAN) TABS, Take 1 tablet by mouth daily  , Disp: , Rfl:     naloxone (NARCAN) 4 mg/0 1 mL nasal spray, Administer 1 spray into a nostril   If no response after 2-3 minutes, give another dose in the other nostril using a new spray , Disp: 1 each, Rfl: 1    NovoLOG FlexPen 100 units/mL injection pen, 10 units with each meal  (Patient taking differently: Inject 10 Units under the skin 3 (three) times a day with meals ), Disp: 5 pen, Rfl: 1    omeprazole (PriLOSEC) 20 mg delayed release capsule, Take 20 mg by mouth daily in the early morning PRN , Disp: , Rfl:     oxybutynin (DITROPAN-XL) 10 MG 24 hr tablet, Take 1 tablet (10 mg total) by mouth daily, Disp: 30 tablet, Rfl: 0    oxyCODONE (OxyCONTIN) 10 mg 12 hr tablet, Take 10 mg by mouth every 12 (twelve) hours, Disp: , Rfl:     sodium chloride, PF, 0 9 %, 10 mL by Intracatheter route daily Intracatheter flushing daily, Disp: 900 mL, Rfl: 0    sodium chloride, PF, 0 9 %, 10 mL by Intracatheter route daily Intracatheter flushing daily, Disp: 900 mL, Rfl: 0    sodium chloride, PF, 0 9 %, 10 mL by Intracatheter route daily Bilateral PCNs to be flushed daily with prefilled 10cc NS, Disp: 600 mL, Rfl: 3    HPI    The following portions of the patient's history were reviewed and updated as appropriate: allergies, current medications, past family history, past medical history, past social history, past surgical history and problem list     Review of Systems   Constitutional: Negative  Negative for activity change, appetite change, fatigue, fever and unexpected weight change  HENT: Negative for congestion, ear pain, hearing loss, mouth sores, postnasal drip, rhinorrhea, sore throat, trouble swallowing and voice change  Eyes: Negative for pain, redness and visual disturbance  Respiratory: Negative for cough, chest tightness, shortness of breath and wheezing  Cardiovascular: Negative for chest pain, palpitations and leg swelling  Gastrointestinal: Negative for abdominal distention, abdominal pain, blood in stool, constipation, diarrhea and nausea  Endocrine: Negative for cold intolerance, heat intolerance, polydipsia, polyphagia and polyuria  Genitourinary: Negative for difficulty urinating, dysuria, flank pain, frequency, hematuria and urgency  Musculoskeletal: Negative for arthralgias, back pain, gait problem, joint swelling and myalgias  Skin: Negative for color change and pallor  Neurological: Negative for dizziness, tremors, seizures, syncope, weakness, numbness and headaches  Hematological: Negative for adenopathy  Does not bruise/bleed easily  Psychiatric/Behavioral: Negative  Negative for sleep disturbance  The patient is not nervous/anxious  Objective: There were no vitals taken for this visit  Physical Exam  Vitals and nursing note reviewed  Constitutional:       Appearance: He is well-developed  HENT:      Head: Normocephalic  Right Ear: External ear normal       Left Ear: External ear normal       Nose: Nose normal       Mouth/Throat:      Pharynx: No oropharyngeal exudate     Eyes:      Conjunctiva/sclera: Conjunctivae normal       Pupils: Pupils are equal, round, and reactive to light  Neck:      Thyroid: No thyromegaly  Cardiovascular:      Rate and Rhythm: Normal rate and regular rhythm  Heart sounds: Normal heart sounds  No murmur heard  No friction rub  No gallop  Comments: S1-S2 regular rhythm  Extremities no edema  Pulmonary:      Effort: Pulmonary effort is normal  No respiratory distress  Breath sounds: Normal breath sounds  No wheezing or rales  Comments: Lungs are clear no wheezing rales or rhonchi  Abdominal:      General: Bowel sounds are normal  There is no distension  Palpations: Abdomen is soft  There is no mass  Tenderness: There is no abdominal tenderness  There is no guarding or rebound  Musculoskeletal:         General: Normal range of motion  Cervical back: Normal range of motion and neck supple  Lymphadenopathy:      Cervical: No cervical adenopathy  Skin:     General: Skin is warm and dry  Neurological:      Mental Status: He is alert and oriented to person, place, and time     Psychiatric:         Behavior: Behavior normal          Judgment: Judgment normal

## 2022-05-13 NOTE — PATIENT INSTRUCTIONS
Change in medications    The Lasix 20 mg take it only as needed if you feel your fluid overloaded you have increasing edema if you need to taking baby decrease it to Monday mid Wednesday and Friday or twice a week    Continue watching her weight

## 2022-05-16 DIAGNOSIS — G89.3 CANCER RELATED PAIN: Primary | ICD-10-CM

## 2022-05-16 DIAGNOSIS — F11.20 CONTINUOUS OPIOID DEPENDENCE (HCC): ICD-10-CM

## 2022-05-16 DIAGNOSIS — C67.3 MALIGNANT NEOPLASM OF ANTERIOR WALL OF URINARY BLADDER (HCC): ICD-10-CM

## 2022-05-17 DIAGNOSIS — F41.9 ANXIETY: ICD-10-CM

## 2022-05-17 RX ORDER — ALPRAZOLAM 0.25 MG/1
TABLET ORAL
Qty: 30 TABLET | Refills: 0 | Status: SHIPPED | OUTPATIENT
Start: 2022-05-17

## 2022-05-17 NOTE — TELEPHONE ENCOUNTER
Please clarify with patient whether he is taking every 8 hours or every 12 hours  Last note and PDMP consistent with Q8H dosing, request is for Q12H

## 2022-05-19 RX ORDER — OXYCODONE HCL 10 MG/1
10 TABLET, FILM COATED, EXTENDED RELEASE ORAL EVERY 8 HOURS SCHEDULED
Qty: 90 TABLET | Refills: 0 | Status: SHIPPED | OUTPATIENT
Start: 2022-05-19

## 2022-05-24 ENCOUNTER — NURSE TRIAGE (OUTPATIENT)
Dept: OTHER | Facility: OTHER | Age: 77
End: 2022-05-24

## 2022-05-24 ENCOUNTER — HOSPITAL ENCOUNTER (OUTPATIENT)
Dept: RADIOLOGY | Facility: HOSPITAL | Age: 77
Discharge: HOME | End: 2022-05-24
Payer: MEDICARE

## 2022-05-24 ENCOUNTER — PREP FOR PROCEDURE (OUTPATIENT)
Dept: INTERVENTIONAL RADIOLOGY/VASCULAR | Facility: CLINIC | Age: 77
End: 2022-05-24

## 2022-05-24 DIAGNOSIS — R30.0 DYSURIA: ICD-10-CM

## 2022-05-24 DIAGNOSIS — C67.9 BLADDER CARCINOMA (HCC): ICD-10-CM

## 2022-05-24 DIAGNOSIS — N39.0 URINARY TRACT INFECTION WITH HEMATURIA, SITE UNSPECIFIED: Primary | ICD-10-CM

## 2022-05-24 DIAGNOSIS — C67.9 BLADDER CARCINOMA (HCC): Primary | ICD-10-CM

## 2022-05-24 DIAGNOSIS — R31.9 URINARY TRACT INFECTION WITH HEMATURIA, SITE UNSPECIFIED: Primary | ICD-10-CM

## 2022-05-24 PROCEDURE — C1729 CATH, DRAINAGE: HCPCS

## 2022-05-24 PROCEDURE — C1887 CATHETER, GUIDING: HCPCS

## 2022-05-24 PROCEDURE — C1769 GUIDE WIRE: HCPCS

## 2022-05-24 PROCEDURE — 50435 EXCHANGE NEPHROSTOMY CATH: CPT

## 2022-05-24 PROCEDURE — 50435 EXCHANGE NEPHROSTOMY CATH: CPT | Performed by: PHYSICIAN ASSISTANT

## 2022-05-24 RX ORDER — PHENAZOPYRIDINE HYDROCHLORIDE 200 MG/1
200 TABLET, FILM COATED ORAL
Qty: 10 TABLET | Refills: 0 | Status: SHIPPED | OUTPATIENT
Start: 2022-05-24 | End: 2022-06-09 | Stop reason: SDUPTHER

## 2022-05-24 RX ADMIN — IODIXANOL 6 ML: 320 INJECTION, SOLUTION INTRAVASCULAR at 14:17

## 2022-05-24 NOTE — SEDATION DOCUMENTATION
Patient arrived to  with his nephrostomy tube in his hand, stating it came out about 9 am and is here to have it replaced  Phone number was provided to patient so he could call with further questions or concerns

## 2022-05-24 NOTE — TELEPHONE ENCOUNTER
Patient called in to report frequency, blood in urine, and severe pain when he voids  Urine culture and urinalysis ordered  Please follow up with patient  Reason for Disposition   Urinating more frequently than usual (i e , frequency)    Answer Assessment - Initial Assessment Questions  1  SYMPTOM: "What's the main symptom you're concerned about?" (e g , frequency, incontinence)      Frequency with blood in urine    2  ONSET: "When did the symptoms  start?"      5/19/22    3  PAIN: "Is there any pain?" If Yes, ask: "How bad is it?" (Scale: 1-10; mild, moderate, severe)      Severe when voiding    4   CAUSE: "What do you think is causing the symptoms?"      UTI    5  OTHER SYMPTOMS: "Do you have any other symptoms?" (e g , fever, flank pain, blood in urine, pain with urination)      Pain with urination    Protocols used: URINARY SYMPTOMS-ADULT-OH

## 2022-05-24 NOTE — TELEPHONE ENCOUNTER
Attempted to contact patient   No answer left detailed voice message with providers recommendations per media consent

## 2022-05-24 NOTE — TELEPHONE ENCOUNTER
Regarding: UTI  ----- Message from Benny Basurto sent at 5/24/2022 10:15 AM EDT -----  " I have a UTI and I would like someone to call me back "

## 2022-05-24 NOTE — BRIEF OP NOTE (RAD/CATH)
Left IR NEPHROSTOMY TUBE CHECK/CHANGE/REPOSITION/REINSERTION/UPSIZE Procedure Note    PATIENT NAME: Walter Boland  : 1945  MRN: [id number]    Pre-op Diagnosis:   1  Bladder carcinoma (HCC)      Post-op Diagnosis:   1   Bladder carcinoma [facility])        Provider:  Walda Rinne, PA-C    Supervising physician:  Dr Cory Chávez    No qualified resident was available, Resident is only observing    Estimated Blood Loss: minimal    Findings: 10Fr left sided nephrostomy reinsertion    Specimens: none    Complications:  None immediate    Anesthesia: local    Walda Rinne, PA-C     Date: 2022  Time: 2:04 PM

## 2022-05-24 NOTE — DISCHARGE INSTRUCTIONS
Nephrostomy Tube Care     WHAT YOU NEED TO KNOW:   A nephrostomy tube is a catheter (thin plastic tube) that is inserted through your skin and into your kidney  The nephrostomy tube drains urine from your kidney into a collecting bag outside your body  You may need a nephrostomy tube when something is blocking the normal flow of urine  A nephrostomy tube may be used for a short or a long period of time  The nephrostomy tube comes out of your back, so you will need someone to help care for your nephrostomy tube  DISCHARGE INSTRUCTIONS:      How to clean the skin around the nephrostomy tube and change the bandage:  Since the nephrostomy tube comes out of your back, you will not be able to care for it by yourself  Ask someone to follow the general directions below to check and care for your nephrostomy tube  Gather the items you will need  Disposable (single use) under-pad, and a clean washcloth  Plain soap, warm water, and new medical gloves  Sterile gauze bandages  Clear adhesive dressing or medical tape  Skin barrier  Protective skin film  Trash bag  Remove the old bandage, and check the tube entry site  Have the patient lie on his side with the nephrostomy tube entry site facing up  Place the under-pad where it will catch drainage as you are working with the nephrostomy tube  Wash your hands with soap and water  Put on new medical gloves  Gently remove the old bandage, without pulling on the tube  Do this by holding the skin beside the tube with one hand  With the other hand, gently remove sticky tape and the skin barrier by pulling in the same direction as hair growth  Do not touch the side of the bandage that is placed over or around the tube  Throw the bandage and skin barrier away in a trash bag  Look for signs of infection, such as skin redness and swelling  Report any skin changes to healthcare providers  Clean the tube entry site      Hold the tube in place to keep it from being pulled out while you are cleaning around it  You will need to clean the area twice  For the first cleaning, wet a new gauze bandage with soap and water  Begin at the entry site of the tube  Wipe the skin in circles, moving away from the entry site  Remove blood and any other material with the gauze  Do this as often as needed  Use a new gauze bandage each time you clean the area, moving away from the entry site  For the second cleaning, wet a new gauze bandage with water  Begin at the entry site of the tube  Wipe the skin in circles, moving away from the entry site  Use a new gauze bandage each time you clean the area, moving away from the entry site  Gently pat the skin with a clean washcloth to dry it  Apply the skin barrier and bandages  Roll up a bandage to make it thick, and place it under  the place where the tube enters the skin  Place it to support the tube, and stop it from kinking or bending  Tape the bandage in place, and apply more bandages if directed by a healthcare provider  Bring the tubing forward to the front and tape it to the skin  Do not stretch the tube tight, because this may pull the nephrostomy tube out  How often to change the bandage  Change the bandage around the tube, every other day  If your bandages  get dirty or wet, change them right away, and as often as needed  If your nephrostomy tube is to be used for a long period of time, the tube needs to be changed every 2 to 3 months  Healthcare providers will tell you when you need to make an appointment to have your tube changed  How to care for the urine drainage bag:   Ask if you need to measure and write down how much urine is in the bag before you empty it  Drain urine out of the drainage bag when it is ½ to ? full  Open the spout at the bottom of the bag to empty the urine into the toilet  You may need to detach the drainage bag from the nephrostomy tube to change it    If so, attach a new drainage bag tightly to the nephrostomy tube  How to prevent problems with your nephrostomy tube:   Change bandages, directed  This helps to prevent infection  Throw away or clean your drainage bag as directed by your healthcare provider  Wipe the connecting ends of the drainage bag with alcohol before you reconnect the bag to the tube  This helps prevent infection  Keep the tube taped to your skin and connected to a drainage bag placed below the level of your kidneys  This helps prevent urine from backing up into your kidneys  You may wear a small drainage bag strapped to your leg to let you move around more easily  Check the catheter to be sure it is in place after you change your clothes or do other activities  Do not wear tight clothing over the tube  Place the tubing over your thigh rather than under it when you are sitting down  Be sure that nothing is pulling on the nephrostomy tube when you move around  Change positions if you see little or no urine in your drainage bag  Check to see if the urine tube is twisted or bent  Be sure that you are not sitting or lying on the tube  If there are no kinks and there is little or no urine in the drainage bag, tell your healthcare provider  Flush out the tube as directed  Some tubes get flushed one time a day with 10 mls of NSS You will be given a prescription for the flushes  To flush the nephrostomy tube, clean both connections with alcohol swap  Twist off the drainage bag tube and twist the saline syringe into the nephrostomy tube and flush briskly  Remove the syringe and twist the drainage bag tube back into the nephrostomy tube  Keep the site covered while you shower  Tape a piece of clear adhesive plastic over the dressing to keep it dry while you shower  Do not take tub baths      Contact Interventional Radiology at [phone] [place] PATIENTS: Contact Interventional Radiology at [phone]) Lisa [place] PATIENTS: Contact Interventional Radiology at [phone]) if:  The skin around the nephrostomy tube is red, swollen, itches, or has a rash  You have a fever greater than 101 or chills  You have lower back or hip pain  There are changes in how your urine looks or smells  You have little or no urine draining from the nephrostomy tube  You have nausea and are vomiting  The black michelle on your tube has moved, or the tube is longer than when it was put in  You have questions or concerns about your condition or care  The nephrostomy tube comes out completely  There is blood, pus, or a bad smell coming from the place where the tube enters your skin  Urine is leaking around the tube  The following pharmacies carry the flush syringes  [facility]                     [facility]       [address]  Phone [phone]            Phone [phone]  [address] & [facility]                      [address]                                 [phone]  Phone [phone]            Phone [phone]    [facility]                                                                         [facility] [phone]  [address]    [facility] [place] [place]   Phone [phone]

## 2022-05-24 NOTE — TELEPHONE ENCOUNTER
Short-term treatment with Pyridium sent to pharmacy on file  Patient to have urine testing performed to ensure/evaluate for urinary tract infection  Patient should increase water intake upwards to 40-60 ounces per day while avoiding bladder irritating foods and beverages-coffee, soda, teas spicy foods and artificial sweeteners    Will await results of urine testing to decide on antibiotic therapy

## 2022-05-25 ENCOUNTER — TELEPHONE (OUTPATIENT)
Dept: RADIOLOGY | Facility: HOSPITAL | Age: 77
End: 2022-05-25

## 2022-05-25 NOTE — TELEPHONE ENCOUNTER
Cont rocephin     10/27- will continue rocephin , will closely monitor     10/29- treated   Returned call to patient Advised per CRNP recommendations  Patient verbalized understanding and agreement and agreeable with plan   Patient will go for urine testing today

## 2022-05-27 ENCOUNTER — APPOINTMENT (OUTPATIENT)
Dept: LAB | Facility: CLINIC | Age: 77
End: 2022-05-27
Payer: MEDICARE

## 2022-05-27 LAB
BACTERIA UR QL AUTO: ABNORMAL /HPF
BILIRUB UR QL STRIP: NEGATIVE
CLARITY UR: ABNORMAL
COLOR UR: ABNORMAL
GLUCOSE UR STRIP-MCNC: NEGATIVE MG/DL
HGB UR QL STRIP.AUTO: ABNORMAL
KETONES UR STRIP-MCNC: NEGATIVE MG/DL
LEUKOCYTE ESTERASE UR QL STRIP: ABNORMAL
NITRITE UR QL STRIP: NEGATIVE
NON-SQ EPI CELLS URNS QL MICRO: ABNORMAL /HPF
PH UR STRIP.AUTO: 6.5 [PH]
PROT UR STRIP-MCNC: ABNORMAL MG/DL
RBC #/AREA URNS AUTO: ABNORMAL /HPF
SP GR UR STRIP.AUTO: 1.01 (ref 1–1.03)
UROBILINOGEN UR STRIP-ACNC: <2 MG/DL
WBC #/AREA URNS AUTO: ABNORMAL /HPF

## 2022-05-27 PROCEDURE — 87077 CULTURE AEROBIC IDENTIFY: CPT

## 2022-05-27 PROCEDURE — 81001 URINALYSIS AUTO W/SCOPE: CPT

## 2022-05-27 PROCEDURE — 87186 SC STD MICRODIL/AGAR DIL: CPT

## 2022-05-27 PROCEDURE — 87086 URINE CULTURE/COLONY COUNT: CPT

## 2022-05-29 LAB
BACTERIA UR CULT: ABNORMAL
BACTERIA UR CULT: ABNORMAL

## 2022-05-31 ENCOUNTER — TELEPHONE (OUTPATIENT)
Dept: UROLOGY | Facility: MEDICAL CENTER | Age: 77
End: 2022-05-31

## 2022-05-31 RX ORDER — NITROFURANTOIN 25; 75 MG/1; MG/1
100 CAPSULE ORAL 2 TIMES DAILY
Qty: 10 CAPSULE | Refills: 0 | Status: SHIPPED | OUTPATIENT
Start: 2022-05-31

## 2022-05-31 NOTE — TELEPHONE ENCOUNTER
Call placed to Pt and LM on his Voicemail as per Communication Consent that a prescription was sent to his Pharmacy on file

## 2022-06-03 ENCOUNTER — HOSPITAL ENCOUNTER (OUTPATIENT)
Dept: CT IMAGING | Facility: HOSPITAL | Age: 77
Discharge: HOME | End: 2022-06-03
Attending: INTERNAL MEDICINE
Payer: MEDICARE

## 2022-06-03 DIAGNOSIS — C67.3 MALIGNANT NEOPLASM OF ANTERIOR WALL OF URINARY BLADDER (HCC): ICD-10-CM

## 2022-06-03 PROCEDURE — 71250 CT THORAX DX C-: CPT

## 2022-06-03 PROCEDURE — 74176 CT ABD & PELVIS W/O CONTRAST: CPT

## 2022-06-07 ENCOUNTER — HOSPITAL ENCOUNTER (OUTPATIENT)
Dept: RADIOLOGY | Facility: HOSPITAL | Age: 77
Discharge: HOME | End: 2022-06-07
Attending: RADIOLOGY | Admitting: RADIOLOGY
Payer: MEDICARE

## 2022-06-07 ENCOUNTER — TELEPHONE (OUTPATIENT)
Dept: VASCULAR SURGERY | Facility: CLINIC | Age: 77
End: 2022-06-07

## 2022-06-07 DIAGNOSIS — C67.9 MALIGNANT NEOPLASM OF URINARY BLADDER, UNSPECIFIED SITE (HCC): ICD-10-CM

## 2022-06-07 PROCEDURE — C1769 GUIDE WIRE: HCPCS

## 2022-06-07 PROCEDURE — 50435 EXCHANGE NEPHROSTOMY CATH: CPT

## 2022-06-07 PROCEDURE — C1729 CATH, DRAINAGE: HCPCS

## 2022-06-07 PROCEDURE — 50435 EXCHANGE NEPHROSTOMY CATH: CPT | Performed by: RADIOLOGY

## 2022-06-07 RX ADMIN — IOHEXOL 10 ML: 350 INJECTION, SOLUTION INTRAVENOUS at 10:40

## 2022-06-07 NOTE — NURSING NOTE
Received message from scheduling stating: Rosalind Dickson called for the following reason- he called to ask if someone can fax paperwork to the [place] regarding details of care as it relates to his neph tube- he mentions you can call Leward Soulier, Dr Shira Litten nurse at [phone] for any reason- he is looking to have the [place] care for his neph tube and in doing so, he needs details faxed for that reason- please call pt to confirm details  He did not have the fax number  Called the [place] at [phone] option #3, option #1  Spoke with Thao Verdugo who states Leward Soulier is on her break and not able to talk  She states patient is wanting the [place] to send someone to his house to care for his tube as his family is unable to and his granddaughter is not able to make it there on a regular basis  She states anything written on how to care for the tube should be sufficient  She provided 2 fax numbers [phone] (front lobby) states this machine often does not receive faxes  Fax #2 [phone] (mental health fax) usually works better  I faxed the pre populated Nephrostomy Tube Care instructions with the patients name and birthday on the bottom that can be found on the AVS to both numbers  First number fax failed  Error code states the machine is busy  Second number confirmed on our fax machine as being received  Spoke with Mr Richard Rosas he was made aware of what was sent and is thankful

## 2022-06-07 NOTE — TELEPHONE ENCOUNTER
Attempted to contact patient to schedule appointment(s) listed below  Requested patient call [phone] option 3 to schedule appointment(s)  Patient's appointment(s) are past due, expected ASAP  PT HAS BEEN CONTACTED IN THE PAST AND STATED HE DID NOT WANT TO FOLLOW UP WITH VASCULAR ANYMORE AND THAT PODIATRY WAS FOLLOWING UP WITH HIM FOR HIS WOUNDS  PLEASE TRY AND CONTACT PT TO GET THEM SCHEDULED FOR LEAD AND OV FOR REVIEW      Dopplers  [] Abdominal Aorta Iliac (AOIL)  [] Carotid (CV)   [] Celiac and/or Mesenteric  [] Endovascular Aortic Repair (EVAR)   [] Hemodialysis Access (HD)   [x] Lower Limb Arterial (DASH)past due, expected ASAP  [] Lower Limb Venous Duplex with Reflux (LEVDR)  [] Renal Artery  [] Upper Limb Arterial (UEA)    [] Upper Limb Venous (UEV)    Advanced Imaging   [] CTA abdomen    [] CTA abdomen & pelvis    [] CT abdomen with/ without contrast  [] CT abdomen with contrast  [] CT abdomen without contrast    [] CT abdomen & pelvis with/ without contrast  [] CT abdomen & pelvis with contrast  [] CT abdomen & pelvis without contrast    Office Visit   [] New patient, patient last seen over 3 years ago  [] Risk factor modification (RFM)   [x] Follow up past due, expected ASAP

## 2022-06-07 NOTE — PROGRESS NOTES
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)    CKD (chronic kidney disease) stage 4, GFR 15-29 ml/min (HCC)       Other    Cancer related pain    Relevant Medications    oxyCODONE (Roxicodone) 5 immediate release tablet      Other Visit Diagnoses     Depression        Relevant Medications    ARIPiprazole (ABILIFY) 2 mg tablet    oxyCODONE (Roxicodone) 5 immediate release tablet          Medications adjusted this encounter:  Requested Prescriptions     Signed Prescriptions Disp Refills    ARIPiprazole (ABILIFY) 2 mg tablet 30 tablet 0     Sig: Take 1 tablet (2 mg total) by mouth daily    oxyCODONE (Roxicodone) 5 immediate release tablet 60 tablet 0     Sig: Take 1-1 5 tablets (5-7 5 mg total) by mouth every 4 (four) hours as needed for moderate pain (1 tab for moderate pain 1 5 tab for severe pain ) Max Daily Amount: 45 mg     No orders of the defined types were placed in this encounter  Medications Discontinued During This Encounter   Medication Reason    ARIPiprazole (ABILIFY) 2 mg tablet Reorder         Julito Garzon was seen today for symptoms and planning cares related to above illnesses  I have reviewed the patient's controlled substance dispensing history in the Prescription Drug Monitoring Program in compliance with the [facility] regulations before prescribing any controlled substances  They are invited to continue to follow with us  If there are questions or concerns, please contact us through our clinic/answering service 24 hours a day, seven days a week      April D EVANGELINA Byrne  [facility]  [phone]      Visit Information    Accompanied By: Spouse    Source of History: Self, Spouse    History Limitations: None    History of Present Lucie Douglas is a 76 y o  male who presents in follow up of symptoms related to bladder carcinoma  Pertinent issues include: symptom management, pain, neoplasm related, fatigue, assessment of goals of care    He is at the office today independently ambulatory without use of assistive devices  He was admitted to [facility] 5[facility]5 after syncopal event  Cardiology recommending 2 weeks of cardiac monitoring  During last admission, Increase Cymbalta from 20-30 mg, discontinue sertraline and add Abilify 2 mg at bedtime by psychiatry  He did feel the Abilify was helpful however he ran out  Abilify refilled  He is taking xanax 0 25 BID as needed he is taking it in the morning and at night and finds that helpful for anxiety  For pain he takes oxycontin 10 mg Q 8 hours  He had taken oxycodone 5 mg Q 4 hours for breakthrough pain however he has not had to use it  At this time he is only taking oxycodone at night before bed  He has significant pain at night related to laying down with his nephrostomy tubes  Which has led to significant difficulty sleeping  He states he takes oxycodone 5 mg without relief  Will increase to 7 5 mg for severe  Pain  Urology also ordered pyridium which he has not started yet which will also hopefully contribute to comfort so he can sleep  The patient reports his appetite has improved and overall feels stronger  He denies any constipation or nausea  Past medical, surgical, social, and family histories are reviewed and pertinent updates are made  Review of Systems   Genitourinary: Positive for flank pain  Neurological:        Sleep disturbance   All other systems reviewed and are negative           Vital Signs    /80 (BP Location: Left arm, Patient Position: Sitting, Cuff Size: Standard)   Pulse 67   Temp (!) 95 3 °F (35 2 °C) (Temporal)   Resp 18   Wt 104 kg (228 lb 13 4 oz)   SpO2 98%   BMI 31 04 kg/m²     Physical Exam and Objective Data  Physical Exam  Vitals and nursing note reviewed  Constitutional:       General: He is awake  Appearance: He is well-developed and normal weight  HENT:      Head: Normocephalic and atraumatic  Jaw: There is normal jaw occlusion  Mouth/Throat:      Mouth: Mucous membranes are moist       Pharynx: Oropharynx is clear  Eyes:      General: Lids are normal       Extraocular Movements: Extraocular movements intact  Conjunctiva/sclera: Conjunctivae normal    Cardiovascular:      Rate and Rhythm: Normal rate and regular rhythm  Heart sounds: No murmur heard  Pulmonary:      Effort: Pulmonary effort is normal  No respiratory distress or retractions  Breath sounds: Normal breath sounds  Abdominal:      Palpations: Abdomen is soft  Tenderness: There is no abdominal tenderness  Genitourinary:     Comments: Nephrostomy tubes  Musculoskeletal:      Cervical back: Neck supple  Comments: Equal, age appropriate extremity stength   Skin:     General: Skin is warm and dry  Neurological:      General: No focal deficit present  Mental Status: He is alert and oriented to person, place, and time  GCS: GCS eye subscore is 4  GCS verbal subscore is 5  GCS motor subscore is 6  Psychiatric:         Attention and Perception: Attention normal          Mood and Affect: Mood normal          Speech: Speech normal          Behavior: Behavior is cooperative  Cognition and Memory: Cognition and memory normal            35+  minutes was spent face to face with Sherman Chan with greater than 50% of the time spent in counseling or coordination of care including discussions of etiology of diagnosis, pathogenesis of diagnosis, diagnostic results, impression, and recommendations, follow up requirements and risk factors and risk reduction of disease  All of the patient's or agent's questions were answered during this discussion

## 2022-06-07 NOTE — DISCHARGE INSTRUCTIONS
Nephrostomy Tube Care     WHAT YOU NEED TO KNOW:   A nephrostomy tube is a catheter (thin plastic tube) that is inserted through your skin and into your kidney  The nephrostomy tube drains urine from your kidney into a collecting bag outside your body  You may need a nephrostomy tube when something is blocking the normal flow of urine  A nephrostomy tube may be used for a short or a long period of time  The nephrostomy tube comes out of your back, so you will need someone to help care for your nephrostomy tube  DISCHARGE INSTRUCTIONS:      How to clean the skin around the nephrostomy tube and change the bandage:  Since the nephrostomy tube comes out of your back, you will not be able to care for it by yourself  Ask someone to follow the general directions below to check and care for your nephrostomy tube  Gather the items you will need  Disposable (single use) under-pad, and a clean washcloth  Plain soap, warm water, and new medical gloves  Sterile gauze bandages  Clear adhesive dressing or medical tape  Skin barrier  Protective skin film  Trash bag  Remove the old bandage, and check the tube entry site  Have the patient lie on his side with the nephrostomy tube entry site facing up  Place the under-pad where it will catch drainage as you are working with the nephrostomy tube  Wash your hands with soap and water  Put on new medical gloves  Gently remove the old bandage, without pulling on the tube  Do this by holding the skin beside the tube with one hand  With the other hand, gently remove sticky tape and the skin barrier by pulling in the same direction as hair growth  Do not touch the side of the bandage that is placed over or around the tube  Throw the bandage and skin barrier away in a trash bag  Look for signs of infection, such as skin redness and swelling  Report any skin changes to healthcare providers  Clean the tube entry site      Hold the tube in place to keep it from being pulled out while you are cleaning around it  You will need to clean the area twice  For the first cleaning, wet a new gauze bandage with soap and water  Begin at the entry site of the tube  Wipe the skin in circles, moving away from the entry site  Remove blood and any other material with the gauze  Do this as often as needed  Use a new gauze bandage each time you clean the area, moving away from the entry site  For the second cleaning, wet a new gauze bandage with water  Begin at the entry site of the tube  Wipe the skin in circles, moving away from the entry site  Use a new gauze bandage each time you clean the area, moving away from the entry site  Gently pat the skin with a clean washcloth to dry it  Apply the skin barrier and bandages  Roll up a bandage to make it thick, and place it under  the place where the tube enters the skin  Place it to support the tube, and stop it from kinking or bending  Tape the bandage in place, and apply more bandages if directed by a healthcare provider  Bring the tubing forward to the front and tape it to the skin  Do not stretch the tube tight, because this may pull the nephrostomy tube out  How often to change the bandage  Change the bandage around the tube, every other day  If your bandages  get dirty or wet, change them right away, and as often as needed  If your nephrostomy tube is to be used for a long period of time, the tube needs to be changed every 2 to 3 months  Healthcare providers will tell you when you need to make an appointment to have your tube changed  How to care for the urine drainage bag:   Ask if you need to measure and write down how much urine is in the bag before you empty it  Drain urine out of the drainage bag when it is ½ to ? full  Open the spout at the bottom of the bag to empty the urine into the toilet  You may need to detach the drainage bag from the nephrostomy tube to change it    If so, attach a new drainage bag tightly to the nephrostomy tube  How to prevent problems with your nephrostomy tube:   Change bandages, directed  This helps to prevent infection  Throw away or clean your drainage bag as directed by your healthcare provider  Wipe the connecting ends of the drainage bag with alcohol before you reconnect the bag to the tube  This helps prevent infection  Keep the tube taped to your skin and connected to a drainage bag placed below the level of your kidneys  This helps prevent urine from backing up into your kidneys  You may wear a small drainage bag strapped to your leg to let you move around more easily  Check the catheter to be sure it is in place after you change your clothes or do other activities  Do not wear tight clothing over the tube  Place the tubing over your thigh rather than under it when you are sitting down  Be sure that nothing is pulling on the nephrostomy tube when you move around  Change positions if you see little or no urine in your drainage bag  Check to see if the urine tube is twisted or bent  Be sure that you are not sitting or lying on the tube  If there are no kinks and there is little or no urine in the drainage bag, tell your healthcare provider  Flush out the tube as directed  Some tubes get flushed one time a day with 10 mls of NSS You will be given a prescription for the flushes  To flush the nephrostomy tube, clean both connections with alcohol swap  Twist off the drainage bag tube and twist the saline syringe into the nephrostomy tube and flush briskly  Remove the syringe and twist the drainage bag tube back into the nephrostomy tube  Keep the site covered while you shower  Tape a piece of clear adhesive plastic over the dressing to keep it dry while you shower  Do not take tub baths      Contact Interventional Radiology at [phone] [place] PATIENTS: Contact Interventional Radiology at [phone]) Julienne Basurto PATIENTS: Contact Interventional Radiology at [phone]) if:  The skin around the nephrostomy tube is red, swollen, itches, or has a rash  You have a fever greater than 101 or chills  You have lower back or hip pain  There are changes in how your urine looks or smells  You have little or no urine draining from the nephrostomy tube  You have nausea and are vomiting  The black michelle on your tube has moved, or the tube is longer than when it was put in  You have questions or concerns about your condition or care  The nephrostomy tube comes out completely  There is blood, pus, or a bad smell coming from the place where the tube enters your skin  Urine is leaking around the tube  The following pharmacies carry the flush syringes  [facility]                     [facility]       [address]  Phone [phone]            Phone [phone]  [address] & [facility]                      [address]                                 [phone]  Phone [phone]            Phone [phone]    [facility]                                                                         [facility] [phone]  [address]   Phone [phone]

## 2022-06-09 ENCOUNTER — TELEPHONE (OUTPATIENT)
Dept: HEMATOLOGY ONCOLOGY | Facility: CLINIC | Age: 77
End: 2022-06-09

## 2022-06-09 ENCOUNTER — OFFICE VISIT (OUTPATIENT)
Dept: INTERNAL MEDICINE CLINIC | Facility: CLINIC | Age: 77
End: 2022-06-09
Payer: MEDICARE

## 2022-06-09 VITALS
TEMPERATURE: 97.5 F | OXYGEN SATURATION: 99 % | RESPIRATION RATE: 18 BRPM | WEIGHT: 228.6 LBS | DIASTOLIC BLOOD PRESSURE: 88 MMHG | HEART RATE: 61 BPM | HEIGHT: 72 IN | BODY MASS INDEX: 30.96 KG/M2 | SYSTOLIC BLOOD PRESSURE: 136 MMHG

## 2022-06-09 DIAGNOSIS — N18.30 TYPE 2 DIABETES MELLITUS WITH STAGE 3 CHRONIC KIDNEY DISEASE, WITH LONG-TERM CURRENT USE OF INSULIN, UNSPECIFIED WHETHER STAGE 3A OR 3B CKD (HCC): Primary | ICD-10-CM

## 2022-06-09 DIAGNOSIS — N30.01 ACUTE CYSTITIS WITH HEMATURIA: ICD-10-CM

## 2022-06-09 DIAGNOSIS — I25.118 CORONARY ARTERY DISEASE OF NATIVE ARTERY OF NATIVE HEART WITH STABLE ANGINA PECTORIS (HCC): ICD-10-CM

## 2022-06-09 DIAGNOSIS — R30.0 DYSURIA: ICD-10-CM

## 2022-06-09 DIAGNOSIS — I45.9 STOKES-ADAMS SYNCOPE: ICD-10-CM

## 2022-06-09 DIAGNOSIS — E11.22 TYPE 2 DIABETES MELLITUS WITH STAGE 3 CHRONIC KIDNEY DISEASE, WITH LONG-TERM CURRENT USE OF INSULIN, UNSPECIFIED WHETHER STAGE 3A OR 3B CKD (HCC): Primary | ICD-10-CM

## 2022-06-09 DIAGNOSIS — F32.1 MODERATE MAJOR DEPRESSION, SINGLE EPISODE (HCC): ICD-10-CM

## 2022-06-09 DIAGNOSIS — N18.4 CKD (CHRONIC KIDNEY DISEASE) STAGE 4, GFR 15-29 ML/MIN (HCC): ICD-10-CM

## 2022-06-09 DIAGNOSIS — I73.9 PAD (PERIPHERAL ARTERY DISEASE) (HCC): ICD-10-CM

## 2022-06-09 DIAGNOSIS — C67.9 BLADDER CARCINOMA (HCC): ICD-10-CM

## 2022-06-09 DIAGNOSIS — Z96.0 RETAINED URETERAL STENT: ICD-10-CM

## 2022-06-09 DIAGNOSIS — Z79.4 TYPE 2 DIABETES MELLITUS WITH STAGE 3 CHRONIC KIDNEY DISEASE, WITH LONG-TERM CURRENT USE OF INSULIN, UNSPECIFIED WHETHER STAGE 3A OR 3B CKD (HCC): Primary | ICD-10-CM

## 2022-06-09 DIAGNOSIS — E78.2 MIXED HYPERLIPIDEMIA: ICD-10-CM

## 2022-06-09 PROCEDURE — 99214 OFFICE O/P EST MOD 30 MIN: CPT | Performed by: INTERNAL MEDICINE

## 2022-06-09 RX ORDER — AMOXICILLIN AND CLAVULANATE POTASSIUM 250; 125 MG/1; MG/1
1 TABLET, FILM COATED ORAL EVERY 8 HOURS SCHEDULED
Qty: 21 TABLET | Refills: 0 | Status: SHIPPED | OUTPATIENT
Start: 2022-06-09 | End: 2022-06-09 | Stop reason: SDUPTHER

## 2022-06-09 RX ORDER — SENNA PLUS 8.6 MG/1
1 TABLET ORAL
COMMUNITY
Start: 2022-03-25

## 2022-06-09 RX ORDER — PHENAZOPYRIDINE HYDROCHLORIDE 200 MG/1
200 TABLET, FILM COATED ORAL
Qty: 10 TABLET | Refills: 0 | Status: SHIPPED | OUTPATIENT
Start: 2022-06-09

## 2022-06-09 RX ORDER — AMOXICILLIN AND CLAVULANATE POTASSIUM 250; 125 MG/1; MG/1
1 TABLET, FILM COATED ORAL EVERY 8 HOURS SCHEDULED
Qty: 21 TABLET | Refills: 0 | Status: SHIPPED | OUTPATIENT
Start: 2022-06-09 | End: 2022-06-16

## 2022-06-09 NOTE — TELEPHONE ENCOUNTER
Called patient and left a voice message stating that Sawyer Hernandes wanted to have this patient scheduled for an appointment so that he could go over patient's cat-scan results  Mentioned that I was able to put patient on his schedule for 6/17 at 11:00am  Stated that if for some reason this date and or time did not work for patient to please give the office a call back

## 2022-06-09 NOTE — PATIENT INSTRUCTIONS
I gave you Augmentin 250 mg to take 3 times a day for urinary tract infection for 1 week  I will reorder the urine culture and urine C&S  Follow-up with urology

## 2022-06-09 NOTE — PROGRESS NOTES
Assessment/Plan:  Help vented in renally adjusted dose to 50 3 times a day and pyridium 200 mg 3 times a day for UTI symptomatic relief cover and a Klebsiella and enterococci      Comes in for routine office visit he had a urinary tract infection the chart was review he is growing Klebsiella and enterococci that is intermediate sensitive to Klebsiella I am sure the enterococci will cover the Augmentin I renally adjusted Augmentin 250 mg 3 times a day for 1 week    Does not have any fever looks nontoxic but he has increasing bladder discomfort he feels is that infection that has happened in the past   He had a course of nitrofurantoin without much relieve    I also order pyridium for symptomatic relief    Coronary artery disease stable he does not have any mm of heart failure no angina    Blood pressure is control    We did a foot examination today does have neuropathy        No problem-specific Assessment & Plan notes found for this encounter  Diagnoses and all orders for this visit:    Type 2 diabetes mellitus with stage 3 chronic kidney disease, with long-term current use of insulin, unspecified whether stage 3a or 3b CKD ([facility] )    Coronary artery disease of native artery of native heart with stable angina pectoris ([facility])    PAD (peripheral artery disease) ([facility] )    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    Bladder carcinoma ([facility])    CKD (chronic kidney disease) stage 4, GFR 15-29 ml/min ([facility])    Retained ureteral stent    Mixed hyperlipidemia    Moderate major depression, single episode ([facility] )          Subjective:      Patient ID: Rachael Foss is a 76 y o  male  No chief complaint on file          Current Outpatient Medications:     ALPRAZolam (XANAX) 0 25 mg tablet, At twice a day as needed for anxiety, Disp: 30 tablet, Rfl: 0    Accu-Chek Softclix Lancets lancets, Test blood sugar 4 times daily, Disp: 200 each, Rfl: 0    acetaminophen (TYLENOL) 325 mg tablet, Take 650 mg by mouth every 6 (six) hours as needed for mild pain  , Disp: , Rfl:     ARIPiprazole (ABILIFY) 2 mg tablet, Take 1 tablet (2 mg total) by mouth daily, Disp: 30 tablet, Rfl: 0    aspirin (ECOTRIN LOW STRENGTH) 81 mg EC tablet, Take 1 tablet (81 mg total) by mouth daily, Disp: , Rfl:     Azelastine HCl 0 15 % SOLN, Inhale 1 spray 2 (two) times a day, Disp: 30 mL, Rfl: 3    Bimatoprost (LUMIGAN OP), Apply 1 drop to eye daily at bedtime , Disp: , Rfl:     calcitriol (ROCALTROL) 0 25 mcg capsule, Take 1 capsule (0 25 mcg total) by mouth daily, Disp: 90 capsule, Rfl: 0    docusate sodium (COLACE) 100 mg capsule, Take 1 capsule (100 mg total) by mouth 2 (two) times a day, Disp: 60 capsule, Rfl: 0    DULoxetine (CYMBALTA) 30 mg delayed release capsule, Take 1 capsule (30 mg total) by mouth daily, Disp: 30 capsule, Rfl: 0    ezetimibe (ZETIA) 10 mg tablet, Take 1 tablet (10 mg total) by mouth daily in the early morning, Disp: 90 tablet, Rfl: 0    Ferrous Sulfate Dried (Feosol) 200 (65 Fe) MG TABS, Take 65 mg by mouth daily, Disp: 90 tablet, Rfl: 0    fluocinonide (LIDEX) 0 05 % cream, Apply topically 2 (two) times a day (Patient taking differently: Apply topically as needed), Disp: 30 g, Rfl: 0    fluticasone (FLONASE) 50 mcg/act nasal spray, 1 spray into each nostril daily (Patient taking differently: 1 spray into each nostril as needed), Disp: 11 mL, Rfl: 1    furosemide (LASIX) 20 mg tablet, Take 1 tablet (20 mg total) by mouth daily, Disp: 90 tablet, Rfl: 1    gabapentin (Neurontin) 300 mg capsule, Take 1 capsule (300 mg total) by mouth daily at bedtime, Disp: 30 capsule, Rfl: 2    glucose blood (Accu-Chek Martha Plus) test strip, Test blood sugar 4 times daily, Disp: 200 strip, Rfl: 0    Insulin Pen Needle 31G X 8 MM MISC, Inject 3 times a day, Disp: 100 each, Rfl: 2    isosorbide mononitrate (IMDUR) 30 mg 24 hr tablet, Take 1 tablet (30 mg total) by mouth daily in the early morning, Disp: 90 tablet, Rfl: 0    Lantus SoloStar 100 units/mL injection pen, 18 units qhs (Patient taking differently: Inject 18 Units under the skin daily at bedtime), Disp: 30 mL, Rfl: 1    lidocaine (XYLOCAINE) 2 % topical gel, Apply topically as needed for mild pain, Disp: 30 mL, Rfl: 0    lidocaine-prilocaine (EMLA) cream, Apply topically as needed for mild pain, Disp: 30 g, Rfl: 0    loperamide (IMODIUM) 2 mg capsule, Take 2 mg by mouth 4 (four) times a day as needed for diarrhea, Disp: , Rfl:     metoprolol succinate (TOPROL-XL) 100 mg 24 hr tablet, Take 1 tablet (100 mg total) by mouth daily for 7 days, Disp: 90 tablet, Rfl: 0    multivitamin (THERAGRAN) TABS, Take 1 tablet by mouth daily  , Disp: , Rfl:     naloxone (NARCAN) 4 mg/0 1 mL nasal spray, Administer 1 spray into a nostril  If no response after 2-3 minutes, give another dose in the other nostril using a new spray , Disp: 1 each, Rfl: 1    nitrofurantoin (MACROBID) 100 mg capsule, Take 1 capsule (100 mg total) by mouth 2 (two) times a day, Disp: 10 capsule, Rfl: 0    NovoLOG FlexPen 100 units/mL injection pen, 10 units with each meal  (Patient taking differently: Inject 10 Units under the skin 3 (three) times a day with meals), Disp: 5 pen, Rfl: 1    omeprazole (PriLOSEC) 20 mg delayed release capsule, Take 20 mg by mouth daily in the early morning PRN , Disp: , Rfl:     oxybutynin (DITROPAN-XL) 10 MG 24 hr tablet, Take 1 tablet (10 mg total) by mouth daily, Disp: 30 tablet, Rfl: 0    oxyCODONE (OxyCONTIN) 10 mg 12 hr tablet, Take 1 tablet (10 mg total) by mouth every 8 (eight) hours Max Daily Amount: 30 mg, Disp: 90 tablet, Rfl: 0    phenazopyridine (PYRIDIUM) 200 mg tablet, Take 1 tablet (200 mg total) by mouth in the morning and 1 tablet (200 mg total) at noon and 1 tablet (200 mg total) in the evening  Take with meals  , Disp: 10 tablet, Rfl: 0    sodium chloride, PF, 0 9 %, 10 mL by Intracatheter route daily Intracatheter flushing daily, Disp: 900 mL, Rfl: 0    sodium chloride, PF, 0 9 %, 10 mL by Intracatheter route daily Intracatheter flushing daily, Disp: 900 mL, Rfl: 0    sodium chloride, PF, 0 9 %, 10 mL by Intracatheter route daily Bilateral PCNs to be flushed daily with prefilled 10cc NS, Disp: 600 mL, Rfl: 3    HPI    The following portions of the patient's history were reviewed and updated as appropriate: allergies, current medications, past family history, past medical history, past social history, past surgical history and problem list     Review of Systems      Objective:Diabetic Foot Exam    Patient's shoes and socks removed  Right Foot/Ankle   Right Foot Inspection  Skin Exam: skin normal  Skin not intact, no dry skin, no warmth, no callus, no erythema, no maceration, no abnormal color, no pre-ulcer, no ulcer and no callus  Toe Exam: ROM and strength within normal limits  No swelling, no tenderness, erythema and  no right toe deformity    Sensory   Vibration: diminished  Proprioception: absent  Monofilament testing: absent    Vascular  The right DP pulse is 0  The right PT pulse is 0  Left Foot/Ankle  Left Foot Inspection  Skin Exam: skin normal  Skin not intact, no dry skin, no warmth, no erythema, no maceration, normal color, no pre-ulcer, no ulcer and no callus  Toe Exam: ROM and strength within normal limits  No swelling, no tenderness, no erythema and no left toe deformity  Sensory   Vibration: diminished  Proprioception: absent  Monofilament testing: absent    Vascular  The left DP pulse is 0  The left PT pulse is 0  Assign Risk Category  No deformity present  Loss of protective sensation  No weak pulses  Risk: 2      There were no vitals taken for this visit  Physical Exam  Vitals and nursing note reviewed  Constitutional:       Appearance: He is well-developed  HENT:      Head: Normocephalic  Right Ear: External ear normal       Left Ear: External ear normal       Nose: Nose normal       Mouth/Throat:      Pharynx: No oropharyngeal exudate     Eyes: Conjunctiva/sclera: Conjunctivae normal       Pupils: Pupils are equal, round, and reactive to light  Neck:      Thyroid: No thyromegaly  Cardiovascular:      Rate and Rhythm: Normal rate and regular rhythm  Pulses: no weak pulses          Dorsalis pedis pulses are 0 on the right side and 0 on the left side  Posterior tibial pulses are 0 on the right side and 0 on the left side  Heart sounds: Normal heart sounds  No murmur heard  No friction rub  No gallop  Comments: S1-S2 regular rhythm  Extremities no edema  Pulmonary:      Effort: Pulmonary effort is normal  No respiratory distress  Breath sounds: Normal breath sounds  No wheezing or rales  Comments: Lungs are clear no wheezing rales or rhonchi  Abdominal:      General: Bowel sounds are normal  There is no distension  Palpations: Abdomen is soft  There is no mass  Tenderness: There is no abdominal tenderness  There is no guarding or rebound  Comments: Abdomen soft nontender   Musculoskeletal:         General: Normal range of motion  Cervical back: Normal range of motion and neck supple  Feet:      Right foot:      Skin integrity: No ulcer, skin breakdown, erythema, warmth, callus or dry skin  Left foot:      Skin integrity: No ulcer, skin breakdown, erythema, warmth, callus or dry skin  Lymphadenopathy:      Cervical: No cervical adenopathy  Skin:     General: Skin is warm and dry  Neurological:      Mental Status: He is alert and oriented to person, place, and time     Psychiatric:         Behavior: Behavior normal          Judgment: Judgment normal

## 2022-06-09 NOTE — TELEPHONE ENCOUNTER
Left detailed message for patient in regards to follow up appointment in our office  Left date, time, and location, as well as [facility] number in case patient needs to reschedule

## 2022-06-09 NOTE — TELEPHONE ENCOUNTER
----- Message from Gissel Quintanilla MD sent at 6/8/2022  4:15 PM EDT -----  Hi all,    Ameena Morocho needs either an inperson or telemedicine visit to go over his CT results  Nothing is scheduled with me oddly enough      hja  ----- Message -----  From: Interface, Radiology Results In  Sent: 6/8/2022   1:24 PM EDT  To: Gissel Quintanilla MD

## 2022-06-10 ENCOUNTER — OFFICE VISIT (OUTPATIENT)
Dept: PALLIATIVE MEDICINE | Facility: CLINIC | Age: 77
End: 2022-06-10
Payer: MEDICARE

## 2022-06-10 VITALS
BODY MASS INDEX: 31.04 KG/M2 | SYSTOLIC BLOOD PRESSURE: 110 MMHG | HEART RATE: 67 BPM | DIASTOLIC BLOOD PRESSURE: 80 MMHG | RESPIRATION RATE: 18 BRPM | OXYGEN SATURATION: 98 % | WEIGHT: 228.84 LBS | TEMPERATURE: 95.3 F

## 2022-06-10 DIAGNOSIS — G89.3 CANCER RELATED PAIN: ICD-10-CM

## 2022-06-10 DIAGNOSIS — C67.3 MALIGNANT NEOPLASM OF ANTERIOR WALL OF URINARY BLADDER (HCC): ICD-10-CM

## 2022-06-10 DIAGNOSIS — F32.A DEPRESSION: ICD-10-CM

## 2022-06-10 DIAGNOSIS — C67.9 BLADDER CARCINOMA (HCC): Primary | ICD-10-CM

## 2022-06-10 DIAGNOSIS — N18.4 CKD (CHRONIC KIDNEY DISEASE) STAGE 4, GFR 15-29 ML/MIN (HCC): ICD-10-CM

## 2022-06-10 PROCEDURE — 99214 OFFICE O/P EST MOD 30 MIN: CPT | Performed by: NURSE PRACTITIONER

## 2022-06-10 RX ORDER — OXYCODONE HYDROCHLORIDE 5 MG/1
5-7.5 TABLET ORAL EVERY 4 HOURS PRN
Qty: 60 TABLET | Refills: 0 | Status: SHIPPED | OUTPATIENT
Start: 2022-06-10

## 2022-06-10 RX ORDER — ARIPIPRAZOLE 2 MG/1
2 TABLET ORAL DAILY
Qty: 30 TABLET | Refills: 0 | Status: SHIPPED | OUTPATIENT
Start: 2022-06-10 | End: 2022-07-14

## 2022-06-10 NOTE — TELEPHONE ENCOUNTER
[facility]- Voice mail was left     Phone number was provide - [phone] opt 3

## 2022-06-13 ENCOUNTER — TELEPHONE (OUTPATIENT)
Dept: UROLOGY | Facility: MEDICAL CENTER | Age: 77
End: 2022-06-13

## 2022-06-13 ENCOUNTER — TELEPHONE (OUTPATIENT)
Dept: HEMATOLOGY ONCOLOGY | Facility: CLINIC | Age: 77
End: 2022-06-13

## 2022-06-13 ENCOUNTER — TELEPHONE (OUTPATIENT)
Dept: VASCULAR SURGERY | Facility: CLINIC | Age: 77
End: 2022-06-13

## 2022-06-13 DIAGNOSIS — I73.9 PAD (PERIPHERAL ARTERY DISEASE) (HCC): Primary | ICD-10-CM

## 2022-06-13 NOTE — TELEPHONE ENCOUNTER
----- Message from Georgina Dominguez RN sent at 6/13/2022  8:35 AM EDT -----  Just following up on patient getting scheduled for his cysto/follow-up with Dr Juan Luis Etienne in October  I know he was going to call back to schedule but it doesn't look like he did  Thank you!

## 2022-06-13 NOTE — TELEPHONE ENCOUNTER
Patient had an Agram on 02/11/2021  Pt called to schedule DASH of left leg  Was due for this after 12/7/21  For the past few weeks, pt  now reports that he has been having bilateral leg rest pain  Also has bilateral leg swelling  Pain is in his lower legs, down to his ankles  No issues ambulating  No open wounds  Legs are normal in temp/color  Routed to provider to advise if bilateral lower extremity DASH should be scheduled, instead of unilateral DASH

## 2022-06-13 NOTE — TELEPHONE ENCOUNTER
Called pt  Made him aware a bilateral DASH was ordered  Transferred to the Call Center to schedule the imaging study

## 2022-06-13 NOTE — TELEPHONE ENCOUNTER
Called patient - to schedule his follow up appointment for October  He does not wish to make the appointment at this time cause he is not sure where he is going to be in October (thinks he might be in the hospital in [place])  He is told that we will call him in September to follow up  He states that this would be fine

## 2022-06-13 NOTE — TELEPHONE ENCOUNTER
CALL RETURN FORM   Reason for patient call? Patient is requesting a call back from Dr Duke Maurer, he did not disclose reason     Patient's primary oncologist?  Dorothy Boyce   Name of person the patient was calling for? Dr Hammer   Any additional information to add, if applicable? n/a   Informed patient that the message will be forwarded to the team and someone will get back to them as soon as possible    Did you relay this information to the patient?  Yes,patient can be reached back at [phone]

## 2022-06-14 ENCOUNTER — TELEPHONE (OUTPATIENT)
Dept: HEMATOLOGY ONCOLOGY | Facility: CLINIC | Age: 77
End: 2022-06-14

## 2022-06-14 NOTE — TELEPHONE ENCOUNTER
Left voicemail for patient to call back or to discuss at appointment on 6/17/22  No pet scan order in [facility]  Appointment on 6/17 is to discuss CT scan  Advised that any additional testing needed can be ordered at the time of his appointment

## 2022-06-14 NOTE — TELEPHONE ENCOUNTER
CALL RETURN FORM   Reason for patient call? Patient calling about PET scan scheduling    Patient's primary oncologist? Dr Taco Bo    Name of person the patient was calling for? Jaquelin   Any additional information to add, if applicable? [phone] please call   Informed patient that the message will be forwarded to the team and someone will get back to them as soon as possible    Did you relay this information to the patient?  yes

## 2022-06-15 ENCOUNTER — TELEPHONE (OUTPATIENT)
Dept: OTHER | Facility: OTHER | Age: 77
End: 2022-06-15

## 2022-06-15 NOTE — TELEPHONE ENCOUNTER
Contacted [facility] for stat lock  Spoke with IR PSR, they will send a message to location and contact patient

## 2022-06-15 NOTE — TELEPHONE ENCOUNTER
Patient called in asking if the office would be able to supply him with 5-6 stat locks for nephrostomy tube  He is unable to get them from [facility]  Patient only has one left

## 2022-06-15 NOTE — TELEPHONE ENCOUNTER
Returned call to patient advised that he can come to the [facility] location   The nurse will leave a stat loc at the   He was advised to come around 10am   RN will check to see if it is the right stat loc

## 2022-06-15 NOTE — TELEPHONE ENCOUNTER
Received call from [facility] that they can not provide patient with stat locks  Informed RN that's been in contact with patient

## 2022-06-16 ENCOUNTER — TELEPHONE (OUTPATIENT)
Dept: UROLOGY | Facility: MEDICAL CENTER | Age: 77
End: 2022-06-16

## 2022-06-16 NOTE — TELEPHONE ENCOUNTER
Patient called to say that he has found a place that will send nephrostomy supplies  Please call patient to discuss

## 2022-06-16 NOTE — TELEPHONE ENCOUNTER
Pt presented to office today asking for nephrostomy tube supplies  Advised that we do not keep Nephrostomy tube supplies here and that  IR was contacted and we will await response from them to order these supplies

## 2022-06-17 ENCOUNTER — TELEPHONE (OUTPATIENT)
Dept: UROLOGY | Facility: MEDICAL CENTER | Age: 77
End: 2022-06-17

## 2022-06-17 ENCOUNTER — OFFICE VISIT (OUTPATIENT)
Dept: HEMATOLOGY ONCOLOGY | Facility: CLINIC | Age: 77
End: 2022-06-17
Payer: MEDICARE

## 2022-06-17 VITALS
TEMPERATURE: 97.6 F | HEIGHT: 72 IN | WEIGHT: 223 LBS | DIASTOLIC BLOOD PRESSURE: 78 MMHG | BODY MASS INDEX: 30.2 KG/M2 | RESPIRATION RATE: 16 BRPM | HEART RATE: 76 BPM | SYSTOLIC BLOOD PRESSURE: 118 MMHG | OXYGEN SATURATION: 96 %

## 2022-06-17 DIAGNOSIS — C67.3 MALIGNANT NEOPLASM OF ANTERIOR WALL OF URINARY BLADDER (HCC): Primary | ICD-10-CM

## 2022-06-17 PROCEDURE — 99214 OFFICE O/P EST MOD 30 MIN: CPT | Performed by: INTERNAL MEDICINE

## 2022-06-17 NOTE — TELEPHONE ENCOUNTER
Spoke with Yvan Martins in IR  They are not sure how they would order them as outpatient  They usually come with the tube and if it is not needed they keep extras on hand  Ordered through their center supply   They do have a few they can leave for him at the   Contacted patient   Advised patient that the nephrostomy stat loc are not something they order for patients  They are given through changes  He can  a few from the [place] IR department on [facility] on Monday

## 2022-06-17 NOTE — PROGRESS NOTES
Hematology/Oncology Outpatient Office Note    Date of Service: 2022    [facility] HEMATOLOGY ONCOLOGY SPECIALISTS STEPHENMARYBETH Castillonorman  [facility]  [phone]    Reason for Consultation:   Chief Complaint   Patient presents with    Follow-up       Cancer Stage at diagnosis: II    Referral Physician: No ref  provider found    Primary Care Physician:  Candie Galo MD     Nickname: Eunice Cifuentes    Spouse: Jennifer Cutler    Granddaughter: Francois Yoon     Original ECO    Today's ECO    Goals and Barriers:  Current Goal: Minimize effects of disease burden, extend life  Barriers to accomplishing this: fatigue, depression, anxiety, worry, L foot claudication, lower back pain from spinal stenosis and uses a cane a lot of the time    Patient's Capacity to Self Care:  Patient is able to self care    Code Status: Full code    Advanced directives: not on file but I recommended he get that done    ASSESSMENT & PLAN      Diagnosis ICD-10-CM Associated Orders   1  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)  C67 3 NM PET CT skull base to mid thigh       This is a 76 y o  c PMHx notable for spinal stenosis, Gastric bypass (), 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, diabetic nephropathy, and renal vascular disease, DM, being seen in consultation for bladder cancer       From an overall performance status basis, I believe Chivo Rather can tolerate systemic treatment due to an ECOG PS of 2  A shared decision making approach was employed during today's visit  Thoughts on approach to systemic therapy in the first line and subsequent lines of therapy:    My favored first line is cisplatin/ gemcitabine however the patient's overall performance status and kidney function is not amenable to this      Thoughts on prediction for Grade III-V toxicity in elderly patients to systemic chemotherapy:  Age >72 years   GI/ cancers  Falls in last 6 months   Hearing impairment (b/l hearing aids)  Limited ability to walk 1 block  Requires assistance with medications  Decreased social activities   Brice Zakikmiberly JCO 2011)  BMI<18 5 or moderate or severe weight loss or decrease in food intake in past 3 months  Mild vs moderate dementia/depression     The above 5 factors predict toxicity for the patient if he undergoes systemic chemotherapy and I discussed this with the patient  For unresectable cases: ICI can be employed if there is POD after definitive CRT    Discussion of disease response monitoring: We discussed with the patient at length the role of imaging and potential blood monitoring of burden of disease  We will opt to get CT chest, abdomen, pelvis without contrast due to kidney function as surveillance following conclusion of therapy  Should there be progression of disease, our plan is to employ repeat biopsy(ies) to assess actionable biomarkers and determine updated bio-marker status  1/27/22 pt only got 1/2 of Days 1-5 5-FU due to contents spilling  Discussion of decision making   Oncology history updated, accordingly, during this visit   Goals of care/patient communication  o I discussed with the patient the clinical course leading up to their cancer diagnosis  I reviewed relevant office notes, imaging reports and pathology result as well   o I told the patient that this is a case of potentially curable disease and what this means  We discussed that the goal of anti-cancer therapy is to provide best quality of life, extend overall survival, and progression free survival as shown in clinical trials   We also discussed that there might be a point when the cancer will no longer respond to this anti-neoplastic therapy    o I explained the risks/benefits of the proposed cancer therapy: 5-FU+Mitomycin + RT and after discussion including understanding risks of possible life-threatening complications and therapy-related malignancy development, informed consent for treatment has been signed   TNM/Staging At Diagnosis  Cancer Staging  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)  Staging form: Urinary Bladder, AJCC 8th Edition  - Clinical stage from 10/18/2021: Stage II (cT2, cN0, cM0) - Signed by Michelle Morris MD on 11/17/2021  Stage prefix: Initial diagnosis  o    Disease Features/Tumor Markers/Genetics  o Tumor Marker:   o Notable Path Features: anterior wall Invasive high-grade urothelial carcinoma  Carcinoma invades muscularis propria (detrusor muscle)    Treatment: 5-FU + MMT (Doing MTC dose reduction 75% due to creat clearance 19 1)   Other Supportive care:    Treatment Team Members  o Surgeon: Dr Juju Desouza: Dr Jan Olsen  o Palliative: referral made   Labs: creat 2 81 (eGFR 24), normocytic anemia, Hgb 9 8   Diagnostics: 11/11/21 CT CAP w/o c: 1  No metastatic disease within the limitations of noncontrast technique in the chest abdomen or pelvis  2   Diffuse hemicircumferential smooth bladder wall thickening on the left, similar to the prior study when allowing for differences in bladder distention  Prostatomegaly  6/3/2022 CT CAP w/o c: Development of a few pulmonary nodules, the largest measuring 8 mm  A PET CT would be recommended  Worsening wall thickening of the urinary bladder which may represent post treatment changes  Cannot exclude cystitis or progression of cancer  Port is without acute issues  Discussion of decision making    I personally communicated with Dr Jan Olsen on this case and have reviewed the following lab results, the image studies, pathology, other specialty/physicians consult notes and recommendations, and outside medical records from [facility]  I had a lengthy discussion with the patient and shared the work-up findings  We discussed the diagnosis and management plan as below   I spent 33 minutes reviewing the records (labs, clinician notes, outside records, medical history, ordering medicine/tests/procedures, interpreting the imaging/labs previously done) and coordination of care as well as direct time with the patient today, of which greater than 50% of the time was spent in counseling and coordination of care with the patient/family  · Plan/Labs  · Continue to assess for signs of distress as he has anxiety/depression  Palliative care referral due to worsening cancer associated pain  · F/u Rad Onc   · PET/CT to further evaluate bladder thickening and pulm nodules that have developed  · F/u Urology with Dr Santana Richard (msg sent to  navigators to assist scheduling)      If the port will not be used, will need to remove  Port flush protocol in the meantime  Follow Up: 2 weeks    All questions were answered to the patient's satisfaction during this encounter  The patient knows the contact information for our office and knows to reach out for any relevant concerns related to this encounter  They are to call for any temperature 100 4 or higher, new symptoms including but not restricted to shaking chills, decreased appetite, nausea, vomiting, diarrhea, increased fatigue, shortness of breath or chest pain, confusion, and not feeling the strength to come to the clinic  For all other listed problems and medical diagnosis in their chart - they are managed by PCP and/or other specialists, which the patient acknowledges  Thank you very much for your consultation and making us a part of this patient's care  We are continuing to follow closely with you  Please do not hesitate to reach out to me with any additional questions or concerns  Carol Campos MD  Hematology & Medical Oncology Staff Physician             Disclaimer: This document was prepared using [employer] technology  If a word or phrase is confusing, or does not make sense, this is likely due to recognition error which was not discovered during this clinician's review   If you believe an error has occurred, please contact me through [facility], [facility] line for [facility]? 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)    Initial Diagnosis    Malignant neoplasm of anterior wall of urinary bladder ([address] )     1994 Surgery    TURBT      1994 - 1995 Chemotherapy    Induction intravesical BCG x 2      8/17/2016 Surgery    TURBT      12/13/2016 Surgery    TURBT  [facility])    Bladder, left posterior wall, biopsy: High-grade urothelial carcinoma in-situ  Muscularis propria is identified with no tumor seen  Bladder, trigone, biopsy: Non-invasive high-grade papillary urothelial carcinoma, and flat urothelial carcinoma in-situ  Muscularis propria is not identified  1/4/2017 - 2/8/2017 Chemotherapy    Induction intravesical BCG     6/19/2017 Surgery    TURBT  [facility])    - Posterior wall, biopsy: Mild chronic cystitis with focal urothelial atypia  Muscularis propria is identified      - Right lateral wall, biopsy: Granulomatous cystitis  Muscularis propria is identified  - Left lateral wall, biopsy: Non invasive high-grade urothelial carcinoma  Muscularis propria is not identified  4/26/2019 Surgery    TURBT   [facility])     - bladder, right posterior wall, biopsy: Urothelial carcinoma in situ   - bladder, right lateral wall, biopsy: Small focus of urothelial carcinoma in situ  - bladder, left posterior wall, biopsy: Urothelial carcinoma in situ   - bladder, left lateral wall, biopsy: Urothelial carcinoma in situ     - bladder, trigone, biopsy: Invasive high-grade urothelial carcinoma, invading into lamina propria  No lymphovascular invasion seen  Muscularis propria not present     Separate piece showing urothelial carcinoma in situ        7/2019 -  Chemotherapy    Induction intravesical BCG x 4      11/4/2019 Surgery    TURBT  [facility])    - Superficial bladder tumor, transurethral resection: Non-invasive high grade urothelial carcinoma, with urothelial carcinoma in situ  Muscularis propria is not identified      - Bladder tumor, transurethral resection: Non-invasive high grade urothelial carcinoma, with urothelial carcinoma in situ, see note  Muscularis propria is present and free of tumor  12/9/2019 - 1/28/2020 Chemotherapy    Induction intravesical BCG+INF        6/9/2020 - 6/23/2020 Chemotherapy    Maintenance intravesical BCG+INF        11/19/2020 Biopsy    TURBT (Milagro Salvador, Dr Abel Spann)    A  Urinary Bladder, Left Posterior Bladder Wall:  -Extensively- denuded urothelial lined mucosa with mild chronic inflammation, vascular congestion  And edema   -Unremarkable small fragment of detrusor muscle present  -No evidence of invasive urothelial carcinoma seen     B  Urinary Bladder, Left lateral bladder Wall:  -Partially-denuded urothelial lined mucosa with mild  chronic inflammation  -Unremarkable small fragment of detrusor muscle present  -No evidence of invasive urothelial carcinoma seen     C  Urinary Bladder, Right Posterior Bladder wall:  -Urothelial lined mucosa with mild  chronic inflammation Von Brunn nests and mild urothelial atypia, possibly due to previous BCG administration   -Unremarkable small fragment of detrusor muscle present  -No evidence of invasive urothelial carcinoma seen     D  Urinary Bladder, Right Lateral Bladder Wall:  - Urothelial lined mucosa with mild  chronic inflammation   -Muscularis propria/ detrusor muscle is not present for evaluation    -No evidence of invasive urothelial carcinoma seen  E  Prostate, Prostate Tissue:  -Urothelial lined mucosa with mild chronic inflammation, prominent Von Brunn nests and Cystitis cystica   -Unremarkable small fragment of detrusor muscle present   -No evidence of malignancy seen               10/18/2021 -  Cancer Staged    Staging form: Urinary Bladder, AJCC 8th Edition  - Clinical stage from 10/18/2021: Stage II (cT2, cN0, cM0) - Signed by Staci Garcia MD on 11/17/2021  Stage prefix: Initial diagnosis       10/18/2021 Surgery    [facility]URBT ([facility])    A  Left posterior wall, bladder (transurethral resection):     - Urothelial tissue with small atypical cauterized focus favored to represent superficially invasive high-grade urothelial carcinoma  - Muscularis propria (detrusor muscle) present, and negative for carcinoma  - Marked edema and mucosal denudation with thermal artifact noted  B  Anterior wall, bladder (transurethral resection):      - Invasive high-grade urothelial carcinoma  - Carcinoma invades muscularis propria (detrusor muscle)  C   Left lateral wall, bladder (transurethral resection):     - Urothelial tissue with small atypical focus with marked thermal artifact; cannot exclude superficial carcinoma  - Muscularis propria (detrusor muscle) present, and negative for carcinoma  - Marked edema and mucosal denudation with thermal artifact noted     1/24/2022 - 3/26/2022 Chemotherapy    mitoMYcin (MUTAMYCIN), 12 mg/m2 = 28 7 mg (100 % of original dose 12 mg/m2), Intravenous, Once, 1 of 1 cycle  Dose modification: 12 mg/m2 (original dose 12 mg/m2, Cycle 1), 3 mg/m2 (original dose 12 mg/m2, Cycle 1)  Administration: 7 2 mg (1/24/2022)  fluorouracil (ADRUCIL) ambulatory infusion Soln, 500 mg/m2/day = 4,780 mg (50 % of original dose 1,000 mg/m2/day), Intravenous, Over 96 hours, 1 of 1 cycle, Start date: 1/18/2022, End date: 1/23/2022  Dose modification: 500 mg/m2/day (original dose 1,000 mg/m2/day, Cycle 1, Reason: Dose modified as per discussion with consulting physician)     1/24/2022 - 3/24/2022 Radiation    Pelvis:1 6X 21 / 21 275 0 5,775 59      Treatment dates:  C1: 1/24/2022 - 3/24/2022       He had been getting BCG vaccine for his bladder since 1994        SUBJECTIVE  (INTERVAL HISTORY)      Clotting History None   Bleeding History No major events   Cancer History Bladder   Family Cancer History None   H/O Blood/Plt Transfusion None Tobacco/etoh/drug abuse 1 5 PPD x 17 years (quit 1970), no other abuse   Hx COVID19 Infection and Vaccine Status Pfizer x 3 (had booster 9/2021)   Cancer Screening history n/a   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)   New medications in the last month: PO iron daily (recommended MWF)  Pain: dysuria (since 10/2021 bladder procedure), LBP, L foot pain s/p remote toe amputation     His maximum weight prior to the gastric bypass that he had was 330lbs  I have reviewed the relevant past medical, surgical, social and family history  I have also reviewed allergies and medications for this patient  Interval events:  No new acute issues  A bit worried about his CT scan results  Otherwise, doing well  Review of Systems  Denies unintentional weight loss, F/C, N/V, CP, diarrhea, constipation, rash, itching, melena, hematuria, hematochezia  Chronic mild SOB with exertion, intermittent anxiety and depression that has been worsening since his cancer diagnosis  He has had fatigue since 2/2021 (his discharge from the hospital for LLE toe amputation)  No new issues although he has been dealing with his chronic back pain over the past year  A 10-point review of system was performed, pertinent positive and negative were detailed as above  Otherwise, the 10-point review of system was negative        Past Medical History:   Diagnosis Date    Anemia     Last assessed: 9/28/17    Anxiety     Arteriosclerotic cardiovascular disease     Last assessed: 9/28/17    Arthritis     Bladder cancer ([facility] [address] )     bladder- had BCG treatments    Chronic kidney disease     Stage IV    CKD (chronic kidney disease) stage 4, GFR 15-29 ml/min (HCC)     Colon polyp     Coronary artery disease     7 stents    Depression     Diabetes mellitus (HCC)     IDDM    GERD (gastroesophageal reflux disease)     Glaucoma     Hematuria     History of fusion of cervical spine     Hyperlipidemia     Hypertension     Insomnia     Last assessed: 11/14/12    Loss of hearing     has hearing aids but usually does not wear them    Other seasonal allergic rhinitis     Last assessed: 2/10/16    PAD (peripheral artery disease) (HCC)     Shortness of breath     on exertion    Spinal stenosis of lumbar region     Transient cerebral ischemia     No Residual    Uses walker     w/c for longer distances       Past Surgical History:   Procedure Laterality Date    CARDIAC SURGERY      Cath stent placement  Last assessed: 3/9/17  Interventional Catheterization    CHOLECYSTECTOMY      COLONOSCOPY      CYSTOSCOPY      Diagnostic w/biopsy  Tommy Villalobos  Last assessed: 12/1/14    CYSTOSCOPY N/A 4/12/2022    Procedure: CYSTOSCOPY  Bladder biopsies  ;  Surgeon: Matthew Kent MD;  [facility];  Service: Urology    CYSTOSCOPY W/ RETROGRADES Right 3/1/2022    Procedure: CYSTO; stent removal retrograde;  Surgeon: Matthew Kent MD;  [facility];  Service: Urology    CYSTOSCOPY W/ URETERAL STENT PLACEMENT Bilateral 10/18/2021    Procedure: bilateral retrogrades, cytology collection;  Surgeon: Matthew Kent MD;  [facility];  Service: Urology    CYSTOURETHROSCOPY      w/cautery  Tommy Villalobos    FL RETROGRADE PYELOGRAM  10/18/2021    FL RETROGRADE PYELOGRAM  10/24/2021    GASTRIC BYPASS      For morbid obesity w/Shaji-en-Y   Resolved: 11/17/09    INCISION AND DRAINAGE OF WOUND Right 2/26/2017    Procedure: INCISION AND DRAINAGE (I&D) EXTREMITY WITH APPLICATION OF GRAFT JACKET;  Surgeon: Dee Kebede DPM;  [facility];  Service:     INCISION AND DRAINAGE OF WOUND Right 4/25/2017    Procedure: INCISION AND DRAINAGE (I&D) EXTREMITY, APPLICATION OF GRAFT;  Surgeon: Dee Kebede DPM;  [facility];  Service:     IR BIOPSY OTHER  7/2/2020    IR LOWER EXTREMITY ANGIOGRAM  2/8/2021    IR LOWER EXTREMITY ANGIOGRAM  2/11/2021    IR NEPHROSTOMY TUBE CHECK/CHANGE/REPOSITION/REINSERTION/UPSIZE  4/28/2022    IR NEPHROSTOMY TUBE CHECK/CHANGE/REPOSITION/REINSERTION/UPSIZE  5/24/2022    IR NEPHROSTOMY TUBE CHECK/CHANGE/REPOSITION/REINSERTION/UPSIZE  6/7/2022    IR NEPHROSTOMY TUBE PLACEMENT  2/25/2022    IR PORT PLACEMENT  1/17/2022    IR TUNNELED CENTRAL LINE PLACEMENT  12/24/2020    JOINT REPLACEMENT      chrsitofer knees replaced    MN AMPUTATION METATARSAL+TOE,SINGLE Left 12/21/2020    Procedure: RAY RESECTION FOOT;  Surgeon: Zachary Sherman DPM;  [facility];  Service: Podiatry    MN AMPUTATION METATARSAL+TOE,SINGLE Left 12/31/2020    Procedure: 5TH MET RESECTION;  Surgeon: Zachary Sherman DPM;  [facility];  Service: Podiatry    MN CYSTOURETHROSCOPY W/IRRIG & EVAC CLOTS N/A 2/10/2021    Procedure: CYSTOSCOPY EVACUATION OF CLOTS, fulguration;  Surgeon: Karthik Vera MD;  [facility];  Service: Urology    MN CYSTOURETHROSCOPY W/IRRIG & EVAC CLOTS N/A 10/24/2021    Procedure: CYSTOSCOPY EVACUATION OF CLOT, fulguration of bleeding vessels, right ureter stent placement, retrograde pyelogram;  Surgeon: Aly Carvalho MD;  [facility];  Service: Urology    MN CYSTOURETHROSCOPY,BIOPSY N/A 8/16/2016    Procedure: CYSTOSCOPY WITH BIOPSIES;  Surgeon: Jimmy Gutierrez MD;  [facility];  Service: Urology    MN CYSTOURETHROSCOPY,FULGUR <0 5 CM LESN N/A 11/19/2020    Procedure: CYSTO W/BIOPSIES, transurethral prostate bx;  Surgeon: Sukumar Ochoa MD;  [facility];  Service: Urology    MN CYSTOURETHROSCOPY,FULGUR >5 CM LESN Bilateral 10/18/2021    Procedure: TRANSURETHRAL RESECTION OF BLADDER TUMOR (TURBT);   Surgeon: Trey Gu MD;  [facility];  Service: Urology    MN Jenifer Kelly 3RD+ ORD [address] PEL/TR [facility] Left 2/8/2021    Procedure: LEG angiogram, CO2 w/limited contrast with balloon angioplasty postertior tibial artery;  Surgeon: Soni Oliveros MD;  [facility];  Service: Vascular    ROTATOR CUFF REPAIR      SMALL INTESTINE SURGERY      Surgery Shaji-en-Y    SPINAL FUSION      lumbar and cervical fusions    VAC DRESSING APPLICATION Right 7086    Procedure: APPLICATION VAC DRESSING;  Surgeon: Evelina Dunlap DPM;  [facility];  Service:    [address] [place] [address] Left 2021    Procedure: FOOT DEBRIDE, [address] OUT w/graft application;  Surgeon: Evelina Dunlap DPM;  [facility];  Service: Podiatry       Family History   Problem Relation Age of Onset    Diabetes Mother     Heart disease Mother     Other Mother         High blood pressure    Heart disease Father     Diabetes Sister     Other Sister         High blood pressure    Kidney disease Sister     Heart disease Brother     Other Brother         High blood pressure       Social History     Socioeconomic History    Marital status: /Civil Union     Spouse name: Not on file    Number of children: Not on file    Years of education: Not on file    Highest education level: Not on file   Occupational History    Not on file   Tobacco Use    Smoking status: Former Smoker     Packs/day: 3 00     Years: 27 00     Pack years: 81 00     Types: Cigarettes     Quit date: 1970     Years since quittin 4    Smokeless tobacco: Never Used   Vaping Use    Vaping Use: Never used   Substance and Sexual Activity    Alcohol use: Never     Comment: beer / liquor    Drug use: Not Currently     Types: Marijuana     Comment: quit 2019 had medical marijuana    Sexual activity: Not Currently   Other Topics Concern    Not on file   Social History Narrative    Consumes 1 cup of coffee and 1 soda per day     Social Determinants of Health     Financial Resource Strain: Not on file   Food Insecurity: Not on file   Transportation Needs: Not on file   Physical Activity: Not on file   Stress: Not on file   Social Connections: Not on file   Intimate Partner Violence: Not on file   Housing Stability: Not on file Allergies   Allergen Reactions    Atorvastatin Hives, Itching and Rash    Simvastatin Rash and Edema     Edema of lower legs    Statins Hives and Itching    Insulin Lispro Swelling and Edema     " Lower Legs"    Other Itching, Rash and Other (See Comments)     "EKG Patches"   "blue EKG patches"       Current Outpatient Medications   Medication Sig Dispense Refill    Accu-Chek Softclix Lancets lancets Test blood sugar 4 times daily 200 each 0    acetaminophen (TYLENOL) 325 mg tablet Take 650 mg by mouth every 6 (six) hours as needed for mild pain        ALPRAZolam (XANAX) 0 25 mg tablet At twice a day as needed for anxiety 30 tablet 0    ARIPiprazole (ABILIFY) 2 mg tablet Take 1 tablet (2 mg total) by mouth daily 30 tablet 0    aspirin (ECOTRIN LOW STRENGTH) 81 mg EC tablet Take 1 tablet (81 mg total) by mouth daily      Azelastine HCl 0 15 % SOLN Inhale 1 spray 2 (two) times a day 30 mL 3    Bimatoprost (LUMIGAN OP) Apply 1 drop to eye daily at bedtime       calcitriol (ROCALTROL) 0 25 mcg capsule Take 1 capsule (0 25 mcg total) by mouth daily 90 capsule 0    docusate sodium (COLACE) 100 mg capsule Take 1 capsule (100 mg total) by mouth 2 (two) times a day 60 capsule 0    ezetimibe (ZETIA) 10 mg tablet Take 1 tablet (10 mg total) by mouth daily in the early morning 90 tablet 0    Ferrous Sulfate Dried (Feosol) 200 (65 Fe) MG TABS Take 65 mg by mouth daily 90 tablet 0    fluocinonide (LIDEX) 0 05 % cream Apply topically 2 (two) times a day (Patient taking differently: Apply topically as needed) 30 g 0    fluticasone (FLONASE) 50 mcg/act nasal spray 1 spray into each nostril daily (Patient taking differently: 1 spray into each nostril as needed) 11 mL 1    furosemide (LASIX) 20 mg tablet Take 1 tablet (20 mg total) by mouth daily 90 tablet 1    gabapentin (Neurontin) 300 mg capsule Take 1 capsule (300 mg total) by mouth daily at bedtime 30 capsule 2    glucose blood (Accu-Chek Martha Plus) test strip Test blood sugar 4 times daily 200 strip 0    Insulin Pen Needle 31G X 8 MM MISC Inject 3 times a day 100 each 2    isosorbide mononitrate (IMDUR) 30 mg 24 hr tablet Take 1 tablet (30 mg total) by mouth daily in the early morning 90 tablet 0    Lantus SoloStar 100 units/mL injection pen 18 units qhs (Patient taking differently: Inject 18 Units under the skin daily at bedtime) 30 mL 1    lidocaine-prilocaine (EMLA) cream Apply topically as needed for mild pain 30 g 0    loperamide (IMODIUM) 2 mg capsule Take 2 mg by mouth 4 (four) times a day as needed for diarrhea      multivitamin (THERAGRAN) TABS Take 1 tablet by mouth daily        nitrofurantoin (MACROBID) 100 mg capsule Take 1 capsule (100 mg total) by mouth 2 (two) times a day 10 capsule 0    NovoLOG FlexPen 100 units/mL injection pen 10 units with each meal  (Patient taking differently: Inject 10 Units under the skin 3 (three) times a day with meals) 5 pen 1    omeprazole (PriLOSEC) 20 mg delayed release capsule Take 20 mg by mouth daily in the early morning PRN       oxybutynin (DITROPAN-XL) 10 MG 24 hr tablet Take 1 tablet (10 mg total) by mouth daily 30 tablet 0    oxyCODONE (OxyCONTIN) 10 mg 12 hr tablet Take 1 tablet (10 mg total) by mouth every 8 (eight) hours Max Daily Amount: 30 mg 90 tablet 0    oxyCODONE (Roxicodone) 5 immediate release tablet Take 1-1 5 tablets (5-7 5 mg total) by mouth every 4 (four) hours as needed for moderate pain (1 tab for moderate pain 1 5 tab for severe pain ) Max Daily Amount: 45 mg 60 tablet 0    phenazopyridine (PYRIDIUM) 200 mg tablet Take 1 tablet (200 mg total) by mouth 3 (three) times a day with meals 10 tablet 0    senna (SENOKOT) 8 6 MG tablet Take 1 tablet by mouth      sodium chloride, PF, 0 9 % 10 mL by Intracatheter route daily Bilateral PCNs to be flushed daily with prefilled 10cc  mL 3    DULoxetine (CYMBALTA) 30 mg delayed release capsule Take 1 capsule (30 mg total) by mouth daily 30 capsule 0    lidocaine (XYLOCAINE) 2 % topical gel Apply topically as needed for mild pain (Patient not taking: No sig reported) 30 mL 0    metoprolol succinate (TOPROL-XL) 100 mg 24 hr tablet Take 1 tablet (100 mg total) by mouth daily for 7 days 90 tablet 0    naloxone (NARCAN) 4 mg/0 1 mL nasal spray Administer 1 spray into a nostril  If no response after 2-3 minutes, give another dose in the other nostril using a new spray  (Patient not taking: Reported on 6/17/2022) 1 each 1    sodium chloride, PF, 0 9 % 10 mL by Intracatheter route daily Intracatheter flushing daily 900 mL 0    sodium chloride, PF, 0 9 % 10 mL by Intracatheter route daily Intracatheter flushing daily 900 mL 0     No current facility-administered medications for this visit  (Not in a hospital admission)      Objective:     24 Hour Vitals Assessment:     Vitals:    06/17/22 1058   BP: 118/78   Pulse: 76   Resp: 16   Temp: 97 6 °F (36 4 °C)   SpO2: 96%       PHYSICIAN EXAM:    General: Appearance: alert, cooperative, in visible distress from pain in his urethra  HEENT: Normocephalic, atraumatic  No scleral icterus  conjunctivae clear  EOMI  Chest: No tenderness to palpation  No open wound noted  Lungs: Clear to auscultation bilaterally, Respirations unlabored  Cardiac: Regular rate and rhythm, +S1and S2  Abdomen: Soft, non-tender, non-distended  Bowel sounds are normal    Extremities:  No edema, cyanosis, clubbing  Skin: Skin color, turgor are normal    Lymphatics: no palpable supra-cervical, axillary, or inguinal adenopathy  Neurologic: Awake, Alert, and oriented, no gross focal deficits noted b/l  Port c/d/i      DATA REVIEW:    Pathology Result:    Final Diagnosis   Date Value Ref Range Status   04/12/2022   Final    A  Urinary Bladder, selected bladder biopsies:  - Focally intact urothelial mucosa with associated acutely inflamed granulation tissue  See comment       B  Prostatic urethra:  - Focally intact urothelial mucosa with associated acutely inflamed granulation tissue  See comment  Comment: Immunohistochemistry for AE1/3 is negative for an infiltrative pattern  Clinical history notable for status post chemotherapy and urine culture positive for Serratia marcescens  The findings are compatible with infectious cystitis  10/18/2021   Final    A  Left posterior wall, bladder (transurethral resection):     - Urothelial tissue with small atypical cauterized focus favored to represent superficially invasive high-grade urothelial carcinoma  - Muscularis propria (detrusor muscle) present, and negative for carcinoma  - Marked edema and mucosal denudation with thermal artifact noted  B  Anterior wall, bladder (transurethral resection):      - Invasive high-grade urothelial carcinoma  - Carcinoma invades muscularis propria (detrusor muscle)  C   Left lateral wall, bladder (transurethral resection):     - Urothelial tissue with small atypical focus with marked thermal artifact; cannot exclude superficial carcinoma  - Muscularis propria (detrusor muscle) present, and negative for carcinoma  - Marked edema and mucosal denudation with thermal artifact noted  Comment: This is an appended report  These results have been appended to a previously preliminary verified report  10/18/2021   Final    A  Renal Washing, RIGHT RENAL PELVIS WASHING:  Suspicious for high grade urothelial carcinoma [facility]) - see comment  Comment:  The above diagnostic category is from the recently published book, [facility] [place] [facility]inary Cyt[facility], and is in keeping with the ongoing effort for utilization of standardized diagnostic terminology in urine cytology  *    *[facility] [place] [facility] Urinary Cytology  Renetta Greco; 2016       B  Renal Washing, LEFT RENAL PELVIS WASHING:  Atypical urothelial cells (AUC) - see comment  Comment:  The above diagnostic category is from the recently published book, [facility] [place] [facility]inary Cyt[facility], and is in keeping with the ongoing effort for utilization of standardized diagnostic terminology in urine cytology  *    *[facility] [place] [facility] Cyt[facility]  Renetta Mcneal; 2016 09/03/2021   Final    A  Urine, Voided, :  Atypical urothelial cells (AUC) - see comment  Red blood cells     Satisfactory for evaluation  Comment:    - Few atypical urothelial cells noted in this patient with a 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  A high grade urothelial carcinoma cannot be excluded on this material  Suggest clinical correlation and follow-up as indicated  - The above diagnostic category is from the recently published book, [facility] [place] [facility] Urinary Cyt[facility], and is in keeping with the ongoing effort for utilization of standardized diagnostic terminology in urine cytology  *    *[facility] [place] [facility] Cyt[facility]  Renettaisadora Loboval Mcneal; 2016 '          12/31/2020   Final    A  Left 5th metatarsal bone:  - Acute osteomyelitis in benign metatarsal bone  12/21/2020   Final    A  Bone, left 5th metatarsal, amputation:       - Acute osteomyelitis  - No dysplasia or malignancy is identified  B  Bone, 5th metatarsal clean margin, excision:       - Focal acute osteomyelitis  - Large caliber blood vessels with luminal calcifications and greater than 75% occlusion        - No dysplasia or malignancy is identified      Interpretation performed at [address] [address]       11/19/2020   Final    A  Urinary Bladder, Left Posterior Bladder Wall:  -Extensively- denuded urothelial lined mucosa with mild chronic inflammation, vascular congestion  And edema   -Unremarkable small fragment of detrusor muscle present  -No evidence of invasive urothelial carcinoma seen    B  Urinary Bladder, Left lateral bladder Wall:  -Partially-denuded urothelial lined mucosa with mild  chronic inflammation  -Unremarkable small fragment of detrusor muscle present  -No evidence of invasive urothelial carcinoma seen    C  Urinary Bladder, Right Posterior Bladder wall:  -Urothelial lined mucosa with mild  chronic inflammation Von Brunn nests and mild urothelial atypia, possibly due to previous BCG administration   -Unremarkable small fragment of detrusor muscle present  -No evidence of invasive urothelial carcinoma seen    D  Urinary Bladder, Right Lateral Bladder Wall:  - Urothelial lined mucosa with mild  chronic inflammation   -Muscularis propria/ detrusor muscle is not present for evaluation    -No evidence of invasive urothelial carcinoma seen  E  Prostate, Prostate Tisssue:  -Urothelial lined mucosa with mild chronic inflammation, prominent Von Brunn nests and Cystitis cystica   -Unremarkable small fragment of detrusor muscle present   -No evidence of malignancy seen  08/17/2020   Final    A  Urine, Other, :  Negative for high grade urothelial carcinoma ([address]) - see comment  Urothelial cells, squamous cells, red blood cells and neutrophils  Satisfactory for evaluation  Comment:  The above diagnostic category is from the recently published book, [facility] [place] [facility] Urinary Cytology, and is in keeping with the ongoing effort for utilization of standardized diagnostic terminology in urine cytology  *    *[facility] [place] [facility] Cyt[facility]  Renetta Mckeon; 2016 08/16/2016   Final    A   Bladder, biopsy:  -  High-grade urothelial carcinoma with flat and focal papillary architecture (see note)  07/18/2016   Final    A  Urine, clean catch (ThinPrep): Atypical urothelial cells (AUC) - see comment    Atypical single urothelial cells, benign squamous cells, red blood cells and neutrophils  Background of degenerative changes noted  Satisfactory for evaluation  Comment:  The above diagnostic category is from the recently published book, [facility] [place] [facility]inary Cyt[facility], and is in keeping with the ongoing effort for utilization of standardized diagnostic terminology in urine cytology  *    *[facility] [place] [facility]  Renetta Llanes [place]; 2016 01/18/2016   Final    A  Urine, Unspecified Source, :  Rare cluster of degenerated appearing urothelial cells present; see note  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  Few red blood cells also identified  Satisfactory for evaluation  Image Results:   Image result are reviewed and documented in Hematology/Oncology history    CT chest abdomen pelvis wo contrast  Narrative: CT CHEST, ABDOMEN AND PELVIS WITHOUT IV CONTRAST    INDICATION:   C67 3: Malignant neoplasm of anterior wall of bladder  COMPARISON:  Chest CT 4/24/2022, abdomen pelvic CT 4/7/2022    TECHNIQUE: CT examination of the chest, abdomen and pelvis was performed without intravenous contrast  This examination was performed without intravenous contrast in the context of the critical nationwide Omnipaque shortage  Lack of IV contrast limits   the sensitivity for pathology within the visualized solid organs  Lack of oral contrast limits the sensitivity for pathology within the bowel  Axial, sagittal, and coronal 2D reformatted images were created from the source data and submitted for   interpretation  Radiation dose length product (DLP) for this visit:  568 mGy-cm     This examination, like all CT scans performed in the [place], was performed utilizing techniques to minimize radiation dose exposure, including the use of iterative   reconstruction and automated exposure control  Enteric contrast was not administered  FINDINGS:    CHEST    LUNGS:  There is a 6 mm right middle lobe nodule (3/73)  Previously described 3 mm right middle lobe nodule is stable (3/80)  There is a new 8 mm nodule in the lingula (3/108)  There is a new 3 to 4 mm subpleural left lower lobe nodule (3/70)  There   is no tracheal or endobronchial lesion  PLEURA:  Unremarkable  HEART/GREAT VESSELS: Heavy coronary artery calcification  No thoracic aortic aneurysm  MEDIASTINUM AND DIMAS:  Unremarkable  CHEST WALL AND LOWER NECK:  Implanted port right anterior chest wall  ABDOMEN    LIVER/BILIARY TREE:  Unremarkable  GALLBLADDER:  Gallbladder is surgically absent  SPLEEN:  Unremarkable  PANCREAS:  Unremarkable  ADRENAL GLANDS:  Unremarkable  KIDNEYS/URETERS:  Bilateral nephrostomy tubes are identified without hydronephrosis  Bilateral renal cysts  STOMACH AND BOWEL:  Shaji-en-Y gastric bypass surgery  Colonic diverticulosis  APPENDIX:  No findings to suggest appendicitis  ABDOMINOPELVIC CAVITY:  Redemonstration of nonspecific retroperitoneal lymph nodes  No ascites  No pneumoperitoneum  VESSELS:  Atherosclerotic changes are present  No evidence of aneurysm  PELVIS    REPRODUCTIVE ORGANS:  The prostate is enlarged  URINARY BLADDER:  The bladder is not well-distended  There is abnormal diffuse wall thickening measuring 2 2 cm which is increased since prior when it was 1 7 cm  The appearance is nonspecific and may represent cystitis/ known cancer  Post treatment   changes can also be considered  ABDOMINAL WALL/INGUINAL REGIONS:  Unremarkable  OSSEOUS STRUCTURES:  No acute fracture or destructive osseous lesion  Spinal degenerative changes are noted  Impression: 1  Development of a few pulmonary nodules, the largest measuring 8 mm  A PET CT would be recommended    2   Worsening wall thickening of the urinary bladder which may represent post treatment changes  Cannot exclude cystitis or progression of cancer  The study was marked in EPIC for significant notification  Workstation performed: [id number]      LABS:  Lab data are reviewed and documented in HemOnc history  Lab Results   Component Value Date    HGB 9 6 (L) 05/05/2022    HCT 28 5 (L) 05/05/2022    MCV 96 05/05/2022     05/05/2022    WBC 5 65 05/05/2022    NRBC 0 05/04/2022    BANDSPCT 3 02/04/2022    ATYLMPCT 1 (H) 02/04/2022     Lab Results   Component Value Date     09/03/2015    K 4 6 05/05/2022     05/05/2022    CO2 27 05/05/2022    ANIONGAP 5 09/03/2015    BUN 39 (H) 05/05/2022    CREATININE 2 79 (H) 05/05/2022    GLUCOSE 203 (H) 09/03/2015    GLUF 187 (H) 02/04/2022    CALCIUM 8 6 05/05/2022    CORRECTEDCA 9 5 05/05/2022    AST 46 (H) 05/05/2022    ALT 73 05/05/2022    ALKPHOS 125 (H) 05/05/2022    PROT 6 4 07/26/2015    BILITOT 0 50 07/26/2015    EGFR 20 05/05/2022       Lab Results   Component Value Date    IRON 186 (H) 12/10/2021    TIBC 377 12/10/2021    FERRITIN 23 12/10/2021    FERRITIN 40 10/13/2021    FERRITIN 31 06/22/2021    FERRITIN 23 02/17/2021    FERRITIN 52 12/19/2020    FERRITIN 73 09/19/2018       No results found for: [id number]    No results for input(s): WBC, CREAT in the last 72 hours      Invalid input(s):  PLT    By:  Elie Estrada MD, 6/18/2022, 8:33 AM

## 2022-06-20 ENCOUNTER — TELEPHONE (OUTPATIENT)
Dept: GYNECOLOGIC ONCOLOGY | Facility: CLINIC | Age: 77
End: 2022-06-20

## 2022-06-20 NOTE — TELEPHONE ENCOUNTER
Thoracic SX Intake Form   Patient Details   Gurdeep Oh     1945     Reason For Appointment   Who is Calling? patient   If not patient, Name? Who is the Referring Doctor? Self referral    Which surgeon is Patient referred to? What is the diagnosis? Lung nodules   Has this diagnosis been confirmed by    imaging/ biopsy/surgery? If yes, what is the date it was done? CT 6/3/22, has PET scheduled for 6/28   Biopsy done at [place] [id number]? If not, where was it done? no   Was imaging done, and was it done at [facility]? If not, where was it done? Yes   Scheduling Information     What is the best call back number?   [phone]   Miscellaneous Information

## 2022-06-20 NOTE — TELEPHONE ENCOUNTER
Patient can see any provider right after PET scan completed ,being referred for lung nodules by himself

## 2022-06-21 NOTE — PROGRESS NOTES
Hematology/Oncology Outpatient Office Note    Date of Service: 2022    [facility] HEMATOLOGY ONCOLOGY SPECIALISTS CELSO Castillonorman  [facility]  [phone]    Reason for Consultation:   Chief Complaint   Patient presents with    Follow-up       Cancer Stage at diagnosis: II, interval progression to Stage IV    Referral Physician: No ref  provider found    Primary Care Physician:  Ruben Albright MD     Nickname: Honey Harleyel    Spouse: Uche Flores    Granddaughter: Saira Chun     Original ECO    Today's ECO    Goals and Barriers:  Current Goal: Minimize effects of disease burden, extend life  Barriers to accomplishing this: fatigue, depression, anxiety, worry, L foot claudication, lower back pain from spinal stenosis and uses a cane a lot of the time    Patient's Capacity to Self Care:  Patient is able to self care    Code Status: Full code    Advanced directives: not on file but I recommended he get that done    ASSESSMENT & PLAN      Diagnosis ICD-10-CM Associated Orders   1   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)  C67 3 Infusion Calculated Appointment Request     CBC and differential     Comprehensive metabolic panel     TSH, 3rd generation with Free T4 reflex     Infusion Calculated Appointment Request     CBC and differential     Comprehensive metabolic panel     TSH, 3rd generation with Free T4 reflex     Infusion Calculated Appointment Request     CBC and differential     Comprehensive metabolic panel     TSH, 3rd generation with Free T4 reflex     Infusion Calculated Appointment Request     CBC and differential     Comprehensive metabolic panel     TSH, 3rd generation with Free T4 reflex     Infusion Calculated Appointment Request     CBC and differential     Comprehensive metabolic panel     TSH, 3rd generation with Free T4 reflex     Infusion Calculated Appointment Request     CBC and differential     Comprehensive metabolic panel TSH, 3rd generation with Free T4 reflex     CT chest abdomen pelvis wo contrast       This is a 76 y o  c PMHx notable for spinal stenosis, Gastric bypass (2011), 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, diabetic nephropathy, and renal vascular disease, DM, being seen in consultation for bladder cancer       From an overall performance status basis, I believe Ed Steele can tolerate systemic treatment due to an ECOG PS of 2  A shared decision making approach was employed during today's visit  Thoughts on approach to systemic therapy in the first line and subsequent lines of therapy:    My favored first line is cisplatin/ gemcitabine however the patient's overall performance status and kidney function is not amenable to this  Thoughts on prediction for Grade III-V toxicity in elderly patients to systemic chemotherapy:  Age >72 years   GI/ cancers  Falls in last 6 months   Hearing impairment (b/l hearing aids)  Limited ability to walk 1 block  Requires assistance with medications  Decreased social activities   Jay Benedict JCO 2011)  BMI<18 5 or moderate or severe weight loss or decrease in food intake in past 3 months  Mild vs moderate dementia/depression     The above 5 factors predict toxicity for the patient if he undergoes systemic chemotherapy and I discussed this with the patient  For unresectable cases: ICI can be employed if there is POD after definitive CRT    Discussion of disease response monitoring: We discussed with the patient at length the role of imaging and potential blood monitoring of burden of disease  We will opt to get CT chest, abdomen, pelvis without contrast due to kidney function as surveillance following conclusion of therapy  As there has been progression of disease, our plan is to employ liquid biopsy to assess actionable biomarkers and determine updated bio-marker status  1/27/22 pt only got 1/2 of Days 1-5 5-FU due to contents spilling   Pt completed concurrent 5-FU/MTC + RT and found to have POD on 6/28/2022 PET/CT (lung mets)  Discussion of decision making   Oncology history updated, accordingly, during this visit   Goals of care/patient communication  o I discussed with the patient the clinical course leading up to their cancer diagnosis  I reviewed relevant office notes, imaging reports and pathology result as well   o I told the patient that this is a case of potentially curable disease and what this means  We discussed that the goal of anti-cancer therapy is to provide best quality of life, extend overall survival, and progression free survival as shown in clinical trials  We also discussed that there might be a point when the cancer will no longer respond to this anti-neoplastic therapy    o I explained the risks/benefits of the proposed cancer therapy: Lay Moy and after discussion including understanding risks of possible life-threatening complications and therapy-related malignancy development, informed consent for treatment has been signed   TNM/Staging At Diagnosis  Cancer Staging  Malignant neoplasm of anterior wall of urinary bladder ([facility] [address] )  Staging form: Urinary Bladder, AJCC 8th Edition  - Clinical stage from 10/18/2021: Stage II (cT2, cN0, cM0) - Signed by Cyn Oglesby MD on 11/17/2021  Stage prefix: Initial diagnosis  o    Disease Features/Tumor Markers/Genetics  o Tumor Marker: n/a  o Notable Path Features: anterior wall Invasive high-grade urothelial carcinoma  Carcinoma invades muscularis propria (detrusor muscle)    Treatment:  Stevo Kasper Other Supportive care:    Treatment Team Members  o Surgeon: Dr Marline Anaya: Dr Andrés Pickering  o Palliative: April Simmons   Labs: creat 2 81 (eGFR 24), normocytic anemia, Hgb 9 8   Diagnostics: 11/11/21 CT CAP w/o c: 1  No metastatic disease within the limitations of noncontrast technique in the chest abdomen or pelvis   2   Diffuse hemicircumferential smooth bladder wall thickening on the left, similar to the prior study when allowing for differences in bladder distention  Prostatomegaly  6/3/2022 CT CAP w/o c: Development of a few pulmonary nodules, the largest measuring 8 mm  A PET CT would be recommended  Worsening wall thickening of the urinary bladder which may represent post treatment changes  Cannot exclude cystitis or progression of cancer  6/28/2022 PET/CT: read pending, per my assessment lung metastatic hypermetabolic nodules as well as L chest wall nodule    Port is without acute issues  Discussion of decision making    I personally communicated with Dr Jelani Wright on this case and have reviewed the following lab results, the image studies, pathology, other specialty/physicians consult notes and recommendations, and outside medical records from [facility]  I had a lengthy discussion with the patient and shared the work-up findings  We discussed the diagnosis and management plan as below  I spent 34 minutes reviewing the records (labs, clinician notes, outside records, medical history, ordering medicine/tests/procedures, interpreting the imaging/labs previously done) and coordination of care as well as direct time with the patient today, of which greater than 50% of the time was spent in counseling and coordination of care with the patient/family  · Plan/Labs  · Continue to assess for signs of distress as he has anxiety/depression  F/u Palliative for cancer associated pain  · F/u Urology for care and management of neph tubes  · Infusion chairs ordered for Keytruda q6 weeks starting in 2 weeks until further POD  Labs to be obtained prior to each cycle (TSH, CMP, CBC)  · Pt getting assessment by Thoracic surgery but he is likely unresectable due to several sites of disease in the body  · Restaging CT CAP in 3 months ordered w/o c      If the port will not be used, will need to remove  Port flush protocol in the meantime      Follow Up: 2 weeks, then q6 weeks thereafter    All questions were answered to the patient's satisfaction during this encounter  The patient knows the contact information for our office and knows to reach out for any relevant concerns related to this encounter  They are to call for any temperature 100 4 or higher, new symptoms including but not restricted to shaking chills, decreased appetite, nausea, vomiting, diarrhea, increased fatigue, shortness of breath or chest pain, confusion, and not feeling the strength to come to the clinic  For all other listed problems and medical diagnosis in their chart - they are managed by PCP and/or other specialists, which the patient acknowledges  Thank you very much for your consultation and making us a part of this patient's care  We are continuing to follow closely with you  Please do not hesitate to reach out to me with any additional questions or concerns  Carol Quintanilla MD  Hematology & Medical Oncology Staff Physician             Disclaimer: This document was prepared using [employer] Fluency Direct technology  If a word or phrase is confusing, or does not make sense, this is likely due to recognition error which was not discovered during this clinician's review  If you believe an error has occurred, please contact me through [address] [place] line for godfrey? 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)    Initial Diagnosis    Malignant neoplasm of anterior wall of urinary bladder ([facility] [address] )     1994 Surgery    TURBT      1994 - 1995 Chemotherapy    Induction intravesical BCG x 2      8/17/2016 Surgery    TURBT      12/13/2016 Surgery    TURBT  [facility])    Bladder, left posterior wall, biopsy: High-grade urothelial carcinoma in-situ  Muscularis propria is identified with no tumor seen  Bladder, trigone, biopsy: Non-invasive high-grade papillary urothelial carcinoma, and flat urothelial carcinoma in-situ  Muscularis propria is not identified  1/4/2017 - 2/8/2017 Chemotherapy    Induction intravesical BCG     6/19/2017 Surgery    TURBT  [facility])    - Posterior wall, biopsy: Mild chronic cystitis with focal urothelial atypia  Muscularis propria is identified      - Right lateral wall, biopsy: Granulomatous cystitis  Muscularis propria is identified  - Left lateral wall, biopsy: Non invasive high-grade urothelial carcinoma  Muscularis propria is not identified  4/26/2019 Surgery    TURBT   [facility])     - bladder, right posterior wall, biopsy: Urothelial carcinoma in situ   - bladder, right lateral wall, biopsy: Small focus of urothelial carcinoma in situ  - bladder, left posterior wall, biopsy: Urothelial carcinoma in situ   - bladder, left lateral wall, biopsy: Urothelial carcinoma in situ     - bladder, trigone, biopsy: Invasive high-grade urothelial carcinoma, invading into lamina propria  No lymphovascular invasion seen  Muscularis propria not present  Separate piece showing urothelial carcinoma in situ        7/2019 -  Chemotherapy    Induction intravesical BCG x 4      11/4/2019 Surgery    TURBT  [facility])    - Superficial bladder tumor, transurethral resection: Non-invasive high grade urothelial carcinoma, with urothelial carcinoma in situ  Muscularis propria is not identified      - Bladder tumor, transurethral resection: Non-invasive high grade urothelial carcinoma, with urothelial carcinoma in situ, see note  Muscularis propria is present and free of tumor  12/9/2019 - 1/28/2020 Chemotherapy    Induction intravesical BCG+INF        6/9/2020 - 6/23/2020 Chemotherapy    Maintenance intravesical BCG+INF        11/19/2020 Biopsy    TURBT (Aron Meier, Dr Gladys Mclaughlin)    A   Urinary Bladder, Left Posterior Bladder Wall:  -Extensively- denuded urothelial lined mucosa with mild chronic inflammation, vascular congestion  And edema   -Unremarkable small fragment of detrusor muscle present  -No evidence of invasive urothelial carcinoma seen     B  Urinary Bladder, Left lateral bladder Wall:  -Partially-denuded urothelial lined mucosa with mild  chronic inflammation  -Unremarkable small fragment of detrusor muscle present  -No evidence of invasive urothelial carcinoma seen     C  Urinary Bladder, Right Posterior Bladder wall:  -Urothelial lined mucosa with mild  chronic inflammation Von Brunn nests and mild urothelial atypia, possibly due to previous BCG administration   -Unremarkable small fragment of detrusor muscle present  -No evidence of invasive urothelial carcinoma seen     D  Urinary Bladder, Right Lateral Bladder Wall:  - Urothelial lined mucosa with mild  chronic inflammation   -Muscularis propria/ detrusor muscle is not present for evaluation    -No evidence of invasive urothelial carcinoma seen  E  Prostate, Prostate Tissue:  -Urothelial lined mucosa with mild chronic inflammation, prominent Von Brunn nests and Cystitis cystica   -Unremarkable small fragment of detrusor muscle present   -No evidence of malignancy seen  10/18/2021 -  Cancer Staged    Staging form: Urinary Bladder, AJCC 8th Edition  - Clinical stage from 10/18/2021: Stage II (cT2, cN0, cM0) - Signed by Lynn Padgett MD on 11/17/2021  Stage prefix: Initial diagnosis       10[facility]18/2021 [facility]    [facility]    A  Left posterior wall, bladder (transurethral resection):     - Urothelial tissue with small atypical cauterized focus favored to represent superficially invasive high-grade urothelial carcinoma  - Muscularis propria (detrusor muscle) present, and negative for carcinoma  - Marked edema and mucosal denudation with thermal artifact noted  B  Anterior wall, bladder (transurethral resection):      - Invasive high-grade urothelial carcinoma  - Carcinoma invades muscularis propria (detrusor muscle)       C   Left lateral wall, bladder (transurethral resection):     - Urothelial tissue with small atypical focus with marked thermal artifact; cannot exclude superficial carcinoma  - Muscularis propria (detrusor muscle) present, and negative for carcinoma        - Marked edema and mucosal denudation with thermal artifact noted     1/24/2022 - 3/26/2022 Chemotherapy    mitoMYcin (MUTAMYCIN), 12 mg/m2 = 28 7 mg (100 % of original dose 12 mg/m2), Intravenous, Once, 1 of 1 cycle  Dose modification: 12 mg/m2 (original dose 12 mg/m2, Cycle 1), 3 mg/m2 (original dose 12 mg/m2, Cycle 1)  Administration: 7 2 mg (1/24/2022)  fluorouracil (ADRUCIL) ambulatory infusion Soln, 500 mg/m2/day = 4,780 mg (50 % of original dose 1,000 mg/m2/day), Intravenous, Over 96 hours, 1 of 1 cycle, Start date: 1/18/2022, End date: 1/23/2022  Dose modification: 500 mg/m2/day (original dose 1,000 mg/m2/day, Cycle 1, Reason: Dose modified as per discussion with consulting physician)     1/24/2022 - 3/24/2022 Radiation    Pelvis:1 6X 21 / 21 275 0 5,775 59      Treatment dates:  C1: 1/24/2022 - 3/24/2022     7/15/2022 -  Chemotherapy    pembrolizumab (KEYTRUDA) IVPB, 400 mg, Intravenous, Once, 0 of 6 cycles       He had been getting BCG vaccine for his bladder since 1994 6/28/2022 PET/CT: read pending, per my assessment lung metastatic hypermetabolic nodules as well as L chest wall nodule    SUBJECTIVE  (INTERVAL HISTORY)      Clotting History None   Bleeding History No major events   Cancer History Bladder   Family Cancer History None   H/O Blood/Plt Transfusion None   Tobacco/etoh/drug abuse 1 5 PPD x 17 years (quit 1970), no other abuse   Hx COVID19 Infection and Vaccine Status Pfizer x 3 (had booster 9/2021)   Cancer Screening history n/a   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)   New medications in the last month: PO iron daily (recommended MWF)  Pain: dysuria (since 10/2021 bladder procedure), LBP, L foot pain s/p remote toe amputation     His maximum weight prior to the gastric bypass that he had was 330lbs  I have reviewed the relevant past medical, surgical, social and family history  I have also reviewed allergies and medications for this patient  Interval events:  Having pain with deep inspiration  Stable energy level, appetite, weight  Otherwise, doing well  Review of Systems  Denies unintentional weight loss, F/C, N/V, CP, diarrhea, constipation, rash, itching, melena, hematuria, hematochezia  Chronic mild SOB with exertion, intermittent anxiety and depression that has been worsening since his cancer diagnosis  He has had fatigue since 2/2021 (his discharge from the hospital for LLE toe amputation)  No new issues although he has been dealing with his chronic back pain over the past year  A 10-point review of system was performed, pertinent positive and negative were detailed as above  Otherwise, the 10-point review of system was negative        Past Medical History:   Diagnosis Date    Anemia     Last assessed: 9/28/17    Anxiety     Arteriosclerotic cardiovascular disease     Last assessed: 9/28/17    Arthritis     Bladder cancer ([facility] [address] )     bladder- had BCG treatments    Chronic kidney disease     Stage IV    CKD (chronic kidney disease) stage 4, GFR 15-29 ml/min ([facility])     Colon polyp     Coronary artery disease     7 stents    Depression     Diabetes mellitus ([facility])     IDDM    GERD (gastroesophageal reflux disease)     Glaucoma     Hematuria     History of fusion of cervical spine     Hyperlipidemia     Hypertension     Insomnia     Last assessed: 11/14/12    Loss of hearing     has hearing aids but usually does not wear them    Other seasonal allergic rhinitis     Last assessed: 2/10/16    PAD (peripheral artery disease) ([facility])     Shortness of breath     on exertion    Spinal stenosis of lumbar region     Transient cerebral ischemia     No Residual    Uses walker     w/c for longer distances       Past Surgical History:   Procedure Laterality Date    CARDIAC SURGERY      Cath stent placement  Last assessed: 3/9/17  Interventional Catheterization    CHOLECYSTECTOMY      COLONOSCOPY      CYSTOSCOPY      Diagnostic w/biopsy  Jared Hernandez  Last assessed: 12/1/14    CYSTOSCOPY N/A 4/12/2022    Procedure: CYSTOSCOPY  Bladder biopsies  ;  Surgeon: Heavenly Boland MD;  [facility];  Service: Urology    CYSTOSCOPY W/ RETROGRADES Right 3/1/2022    Procedure: CYSTO; stent removal retrograde;  Surgeon: Heavenly Boland MD;  [facility];  Service: Urology    CYSTOSCOPY W/ URETERAL STENT PLACEMENT Bilateral 10/18/2021    Procedure: bilateral retrogrades, cytology collection;  Surgeon: Heavenly Boland MD;  [facility];  Service: Urology    CYSTOURETHROSCOPY      w/cautery  Jared Hernandez    FL RETROGRADE PYELOGRAM  10/18/2021    FL RETROGRADE PYELOGRAM  10/24/2021    GASTRIC BYPASS      For morbid obesity w/Shaji-en-Y   Resolved: 11/17/09    INCISION AND DRAINAGE OF WOUND Right 2/26/2017    Procedure: INCISION AND DRAINAGE (I&D) EXTREMITY WITH APPLICATION OF GRAFT JACKET;  Surgeon: Ashley Banuelos DPM;  [facility];  Service:     INCISION AND DRAINAGE OF WOUND Right 4/25/2017    Procedure: INCISION AND DRAINAGE (I&D) EXTREMITY, APPLICATION OF GRAFT;  Surgeon: Ashley Banuelos DPM;  [facility];  Service:     IR BIOPSY OTHER  7/2/2020    IR LOWER EXTREMITY ANGIOGRAM  2/8/2021    IR LOWER EXTREMITY ANGIOGRAM  2/11/2021    IR NEPHROSTOMY TUBE CHECK/CHANGE/REPOSITION/REINSERTION/UPSIZE  4/28/2022    IR NEPHROSTOMY TUBE CHECK/CHANGE/REPOSITION/REINSERTION/UPSIZE  5/24/2022    IR NEPHROSTOMY TUBE CHECK/CHANGE/REPOSITION/REINSERTION/UPSIZE  6/7/2022    IR NEPHROSTOMY TUBE PLACEMENT  2/25/2022    IR PORT PLACEMENT  1/17/2022    IR TUNNELED CENTRAL LINE PLACEMENT  12/24/2020    JOINT REPLACEMENT      christofer knees replaced    MD AMPUTATION METATARSAL+TOE,SINGLE Left 12/21/2020    Procedure: RAY RESECTION FOOT; Surgeon: Clay Everett DPM;  [facility];  Service: Podiatry    MS AMPUTATION METATARSAL+TOE,SINGLE Left 12/31/2020    Procedure: 5TH MET RESECTION;  Surgeon: Clay Everett DPM;  [facility];  Service: Podiatry    MS CYSTOURETHROSCOPY W/IRRIG & EVAC CLOTS N/A 2/10/2021    Procedure: CYSTOSCOPY EVACUATION OF CLOTS, fulguration;  Surgeon: Noemi Cogan, MD;  [facility];  Service: Urology    MS CYSTOURETHROSCOPY W/IRRIG & EVAC CLOTS N/A 10/24/2021    Procedure: CYSTOSCOPY EVACUATION OF CLOT, fulguration of bleeding vessels, right ureter stent placement, retrograde pyelogram;  Surgeon: Hannah Broussard MD;  [facility];  Service: Urology    MS CYSTOURETHROSCOPY,BIOPSY N/A 8/16/2016    Procedure: CYSTOSCOPY WITH BIOPSIES;  Surgeon: Jesus Jerez MD;  [facility];  Service: Urology    MS CYSTOURETHROSCOPY,FULGUR <0 5 CM LESN N/A 11/19/2020    Procedure: CYSTO W/BIOPSIES, transurethral prostate bx;  Surgeon: Angeli Howard MD;  [facility];  Service: Urology    MS CYSTOURETHROSCOPY,FULGUR >5 CM LESN Bilateral 10/18/2021    Procedure: TRANSURETHRAL RESECTION OF BLADDER TUMOR (TURBT);   Surgeon: Gerber Mix MD;  [facility];  Service: Urology    MS [facility] 3RD+ ORD SLCTV ABDL PEL[facility] Left 2/8/2021    Procedure: LEG angiogram, CO2 w/limited contrast with balloon angioplasty postertior tibial artery;  Surgeon: Sharmila German MD;  [facility];  Service: Vascular    ROTATOR CUFF REPAIR      SMALL INTESTINE SURGERY      Surgery Shaji-en-Y    SPINAL FUSION      lumbar and cervical fusions    VAC DRESSING APPLICATION Right 7/85/5584    Procedure: APPLICATION VAC DRESSING;  Surgeon: Raine Grayson DPM;  [facility];  Service:     WOUND DEBRIDEMENT Left 2/16/2021    Procedure: FOOT DEBRIDE, [facility] OUT w/graft application;  Surgeon: Raine Grayson DPM;  [facility];  Service: Podiatry       Family History Problem Relation Age of Onset    Diabetes Mother     Heart disease Mother     Other Mother         High blood pressure    Heart disease Father     Diabetes Sister     Other Sister         High blood pressure    Kidney disease Sister     Heart disease Brother     Other Brother         High blood pressure       Social History     Socioeconomic History    Marital status: /Civil Union     Spouse name: Not on file    Number of children: Not on file    Years of education: Not on file    Highest education level: Not on file   Occupational History    Not on file   Tobacco Use    Smoking status: Former Smoker     Packs/day: 3 00     Years: 27 00     Pack years: 81 00     Types: Cigarettes     Quit date: 1970     Years since quittin 5    Smokeless tobacco: Never Used   Vaping Use    Vaping Use: Never used   Substance and Sexual Activity    Alcohol use: Never     Comment: beer / liquor    Drug use: Not Currently     Types: Marijuana     Comment: quit 2019 had medical marijuana    Sexual activity: Not Currently   Other Topics Concern    Not on file   Social History Narrative    Consumes 1 cup of coffee and 1 soda per day     Social Determinants of Health     Financial Resource Strain: Not on file   Food Insecurity: Not on file   Transportation Needs: Not on file   Physical Activity: Not on file   Stress: Not on file   Social Connections: Not on file   Intimate Partner Violence: Not on file   Housing Stability: Not on file       Allergies   Allergen Reactions    Atorvastatin Hives, Itching and Rash    Simvastatin Rash and Edema     Edema of lower legs    Statins Hives and Itching    Insulin Lispro Swelling and Edema     " Lower Legs"    Other Itching, Rash and Other (See Comments)     "EKG Patches"   "blue EKG patches"       Current Outpatient Medications   Medication Sig Dispense Refill    Accu-Chek Softclix Lancets lancets Test blood sugar 4 times daily 200 each 0    acetaminophen (TYLENOL) 325 mg tablet Take 650 mg by mouth every 6 (six) hours as needed for mild pain        ALPRAZolam (XANAX) 0 25 mg tablet At twice a day as needed for anxiety 30 tablet 0    ARIPiprazole (ABILIFY) 2 mg tablet Take 1 tablet (2 mg total) by mouth daily 30 tablet 0    aspirin (ECOTRIN LOW STRENGTH) 81 mg EC tablet Take 1 tablet (81 mg total) by mouth daily      Azelastine HCl 0 15 % SOLN Inhale 1 spray 2 (two) times a day 30 mL 3    Bimatoprost (LUMIGAN OP) Apply 1 drop to eye daily at bedtime       calcitriol (ROCALTROL) 0 25 mcg capsule Take 1 capsule (0 25 mcg total) by mouth daily 90 capsule 0    docusate sodium (COLACE) 100 mg capsule Take 1 capsule (100 mg total) by mouth 2 (two) times a day 60 capsule 0    ezetimibe (ZETIA) 10 mg tablet Take 1 tablet (10 mg total) by mouth daily in the early morning 90 tablet 0    Ferrous Sulfate Dried (Feosol) 200 (65 Fe) MG TABS Take 65 mg by mouth daily 90 tablet 0    fluocinonide (LIDEX) 0 05 % cream Apply topically 2 (two) times a day (Patient taking differently: Apply topically as needed) 30 g 0    fluticasone (FLONASE) 50 mcg/act nasal spray 1 spray into each nostril daily (Patient taking differently: 1 spray into each nostril as needed) 11 mL 1    furosemide (LASIX) 20 mg tablet Take 1 tablet (20 mg total) by mouth daily 90 tablet 1    gabapentin (Neurontin) 300 mg capsule Take 1 capsule (300 mg total) by mouth daily at bedtime 30 capsule 2    glucose blood (Accu-Chek Martha Plus) test strip Test blood sugar 4 times daily 200 strip 0    Insulin Pen Needle 31G X 8 MM MISC Inject 3 times a day 100 each 2    isosorbide mononitrate (IMDUR) 30 mg 24 hr tablet Take 1 tablet (30 mg total) by mouth daily in the early morning 90 tablet 0    Lantus SoloStar 100 units/mL injection pen 18 units qhs (Patient taking differently: Inject 18 Units under the skin daily at bedtime) 30 mL 1    lidocaine-prilocaine (EMLA) cream Apply topically as needed for mild pain 30 g 0    loperamide (IMODIUM) 2 mg capsule Take 2 mg by mouth 4 (four) times a day as needed for diarrhea      multivitamin (THERAGRAN) TABS Take 1 tablet by mouth daily   nitrofurantoin (MACROBID) 100 mg capsule Take 1 capsule (100 mg total) by mouth 2 (two) times a day 10 capsule 0    NovoLOG FlexPen 100 units/mL injection pen 10 units with each meal  (Patient taking differently: Inject 10 Units under the skin 3 (three) times a day with meals) 5 pen 1    omeprazole (PriLOSEC) 20 mg delayed release capsule Take 20 mg by mouth daily in the early morning PRN       oxybutynin (DITROPAN-XL) 10 MG 24 hr tablet Take 1 tablet (10 mg total) by mouth daily 30 tablet 0    oxyCODONE (OxyCONTIN) 10 mg 12 hr tablet Take 1 tablet (10 mg total) by mouth every 8 (eight) hours Max Daily Amount: 30 mg 90 tablet 0    oxyCODONE (Roxicodone) 5 immediate release tablet Take 1-1 5 tablets (5-7 5 mg total) by mouth every 4 (four) hours as needed for moderate pain (1 tab for moderate pain 1 5 tab for severe pain ) Max Daily Amount: 45 mg 60 tablet 0    phenazopyridine (PYRIDIUM) 200 mg tablet Take 1 tablet (200 mg total) by mouth 3 (three) times a day with meals 10 tablet 0    senna (SENOKOT) 8 6 MG tablet Take 1 tablet by mouth      sodium chloride, PF, 0 9 % 10 mL by Intracatheter route daily Bilateral PCNs to be flushed daily with prefilled 10cc  mL 3    DULoxetine (CYMBALTA) 30 mg delayed release capsule Take 1 capsule (30 mg total) by mouth daily 30 capsule 0    lidocaine (XYLOCAINE) 2 % topical gel Apply topically as needed for mild pain (Patient not taking: No sig reported) 30 mL 0    metoprolol succinate (TOPROL-XL) 100 mg 24 hr tablet Take 1 tablet (100 mg total) by mouth daily for 7 days 90 tablet 0    naloxone (NARCAN) 4 mg/0 1 mL nasal spray Administer 1 spray into a nostril  If no response after 2-3 minutes, give another dose in the other nostril using a new spray   (Patient not taking: No sig reported) 1 each 1    sodium chloride, PF, 0 9 % 10 mL by Intracatheter route daily Intracatheter flushing daily 900 mL 0    sodium chloride, PF, 0 9 % 10 mL by Intracatheter route daily Intracatheter flushing daily 900 mL 0     No current facility-administered medications for this visit  (Not in a hospital admission)      Objective:     24 Hour Vitals Assessment:     Vitals:    07/01/22 0920   BP: 132/76   Pulse: 76   Resp: 20   SpO2: 99%       PHYSICIAN EXAM:    General: Appearance: alert, cooperative, in visible distress from pain in his urethra  HEENT: Normocephalic, atraumatic  No scleral icterus  conjunctivae clear  EOMI  Chest: No tenderness to palpation  No open wound noted  Lungs: Clear to auscultation bilaterally, Respirations unlabored  Cardiac: Regular rate and rhythm, +S1and S2  Abdomen: Soft, non-tender, non-distended  Bowel sounds are normal    Extremities:  No edema, cyanosis, clubbing  Skin: Skin color, turgor are normal    Lymphatics: no palpable supra-cervical, axillary, or inguinal adenopathy  Neurologic: Awake, Alert, and oriented, no gross focal deficits noted b/l  Port c/d/i      DATA REVIEW:    Pathology Result:    Final Diagnosis   Date Value Ref Range Status   04/12/2022   Final    A  Urinary Bladder, selected bladder biopsies:  - Focally intact urothelial mucosa with associated acutely inflamed granulation tissue  See comment  B  Prostatic urethra:  - Focally intact urothelial mucosa with associated acutely inflamed granulation tissue  See comment  Comment: Immunohistochemistry for AE1/3 is negative for an infiltrative pattern  Clinical history notable for status post chemotherapy and urine culture positive for Serratia marcescens  The findings are compatible with infectious cystitis  10/18/2021   Final    A    Left posterior wall, bladder (transurethral resection):     - Urothelial tissue with small atypical cauterized focus favored to represent superficially invasive high-grade urothelial carcinoma  - Muscularis propria (detrusor muscle) present, and negative for carcinoma  - Marked edema and mucosal denudation with thermal artifact noted  B  Anterior wall, bladder (transurethral resection):      - Invasive high-grade urothelial carcinoma  - Carcinoma invades muscularis propria (detrusor muscle)  C   Left lateral wall, bladder (transurethral resection):     - Urothelial tissue with small atypical focus with marked thermal artifact; cannot exclude superficial carcinoma  - Muscularis propria (detrusor muscle) present, and negative for carcinoma  - Marked edema and mucosal denudation with thermal artifact noted  Comment: This is an appended report  These results have been appended to a previously preliminary verified report  10/18/2021   Final    A  Renal Washing, RIGHT RENAL PELVIS WASHING:  Suspicious for high grade urothelial carcinoma [facility]) - see comment  Comment:  The above diagnostic category is from the recently published book, [facility] [place] [facility] Urinary Cytology, and is in keeping with the ongoing effort for utilization of standardized diagnostic terminology in urine cytology  *    *[facility] [place] [facility] Urinary Cytology  Renetta Raya; 2016  B  Renal Washing, LEFT RENAL PELVIS WASHING:  Atypical urothelial cells (AUC) - see comment  Comment:  The above diagnostic category is from the recently published book, [facility] [place] [facility] Urinary Cytology, and is in keeping with the ongoing effort for utilization of standardized diagnostic terminology in urine cytology  *    *[facility] [place] [facility] Urinary Cytology  Renetta Raya; 2016 09/03/2021   Final    A  Urine, Voided, :  Atypical urothelial cells (AUC) - see comment  Red blood cells     Satisfactory for evaluation      Comment: - Few atypical urothelial cells noted in this patient with a 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  A high grade urothelial carcinoma cannot be excluded on this material  Suggest clinical correlation and follow-up as indicated  - The above diagnostic category is from the recently published book, [facility] [place] [facility] Urinary Cytology, and is in keeping with the ongoing effort for utilization of standardized diagnostic terminology in urine cytology  *    *[facility] [place] [facility] Urinary Cyt[facility]  Renetta Grande; 2016 ' 12/31/2020   Final    A  Left 5th metatarsal bone:  - Acute osteomyelitis in benign metatarsal bone  12/21/2020   Final    A  Bone, left 5th metatarsal, amputation:       - Acute osteomyelitis  - No dysplasia or malignancy is identified  B  Bone, 5th metatarsal clean margin, excision:       - Focal acute osteomyelitis  - Large caliber blood vessels with luminal calcifications and greater than 75% occlusion        - No dysplasia or malignancy is identified  Interpretation performed at [facility] [id number] [facility] [place] [id number]       11/19/2020   Final    A  Urinary Bladder, Left Posterior Bladder Wall:  -Extensively- denuded urothelial lined mucosa with mild chronic inflammation, vascular congestion  And edema   -Unremarkable small fragment of detrusor muscle present  -No evidence of invasive urothelial carcinoma seen    B  Urinary Bladder, Left lateral bladder Wall:  -Partially-denuded urothelial lined mucosa with mild  chronic inflammation  -Unremarkable small fragment of detrusor muscle present  -No evidence of invasive urothelial carcinoma seen    C   Urinary Bladder, Right Posterior Bladder wall:  -Urothelial lined mucosa with mild  chronic inflammation Von Brunn nests and mild urothelial atypia, possibly due to previous BCG administration   -Unremarkable small fragment of detrusor muscle present  -No evidence of invasive urothelial carcinoma seen    D  Urinary Bladder, Right Lateral Bladder Wall:  - Urothelial lined mucosa with mild  chronic inflammation   -Muscularis propria/ detrusor muscle is not present for evaluation    -No evidence of invasive urothelial carcinoma seen  E  Prostate, Prostate Tisssue:  -Urothelial lined mucosa with mild chronic inflammation, prominent Von Brunn nests and Cystitis cystica   -Unremarkable small fragment of detrusor muscle present   -No evidence of malignancy seen  08/17/2020   Final    A  Urine, Other, :  Negative for high grade urothelial carcinoma ([address]) - see comment  Urothelial cells, squamous cells, red blood cells and neutrophils  Satisfactory for evaluation  Comment:  The above diagnostic category is from the recently published book, [facility] [place] [facility] Urinary Cytology, and is in keeping with the ongoing effort for utilization of standardized diagnostic terminology in urine cytology  *    *[facility] [place] [facility] Urinary Cytology  Renetta Adler; 2016             08/16/2016   Final    A   Bladder, biopsy:  -  High-grade urothelial carcinoma with flat and focal papillary architecture (see note)  07/18/2016   Final    A  Urine, clean catch (ThinPrep): Atypical urothelial cells (AUC) - see comment  Atypical single urothelial cells, benign squamous cells, red blood cells and neutrophils  Background of degenerative changes noted  Satisfactory for evaluation  Comment:  The above diagnostic category is from the recently published book, [facility] [place] [facility] Urinary Cytology, and is in keeping with the ongoing effort for utilization of standardized diagnostic terminology in urine cytology  *    *[facility] [place] [facility] Urinary Cytology  Renetta Adler; 2016 01/18/2016   Final A  Urine, Unspecified Source, :  Rare cluster of degenerated appearing urothelial cells present; see note  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  Few red blood cells also identified  Satisfactory for evaluation  Image Results:   Image result are reviewed and documented in Hematology/Oncology history    CT chest abdomen pelvis wo contrast  Narrative: CT CHEST, ABDOMEN AND PELVIS WITHOUT IV CONTRAST    INDICATION:   C67 3: Malignant neoplasm of anterior wall of bladder  COMPARISON:  Chest CT 4/24/2022, abdomen pelvic CT 4/7/2022    TECHNIQUE: CT examination of the chest, abdomen and pelvis was performed without intravenous contrast  This examination was performed without intravenous contrast in the context of the critical nationwide Omnipaque shortage  Lack of IV contrast limits   the sensitivity for pathology within the visualized solid organs  Lack of oral contrast limits the sensitivity for pathology within the bowel  Axial, sagittal, and coronal 2D reformatted images were created from the source data and submitted for   interpretation  Radiation dose length product (DLP) for this visit:  568 mGy-cm   This examination, like all CT scans performed in the [facility], was performed utilizing techniques to minimize radiation dose exposure, including the use of iterative   reconstruction and automated exposure control  Enteric contrast was not administered  FINDINGS:    CHEST    LUNGS:  There is a 6 mm right middle lobe nodule (3/73)  Previously described 3 mm right middle lobe nodule is stable (3/80)  There is a new 8 mm nodule in the lingula (3/108)  There is a new 3 to 4 mm subpleural left lower lobe nodule (3/70)  There   is no tracheal or endobronchial lesion  PLEURA:  Unremarkable  HEART/GREAT VESSELS: Heavy coronary artery calcification  No thoracic aortic aneurysm      MEDIASTINUM AND DIMAS: Unremarkable  CHEST WALL AND LOWER NECK:  Implanted port right anterior chest wall  ABDOMEN    LIVER/BILIARY TREE:  Unremarkable  GALLBLADDER:  Gallbladder is surgically absent  SPLEEN:  Unremarkable  PANCREAS:  Unremarkable  ADRENAL GLANDS:  Unremarkable  KIDNEYS/URETERS:  Bilateral nephrostomy tubes are identified without hydronephrosis  Bilateral renal cysts  STOMACH AND BOWEL:  Shaji-en-Y gastric bypass surgery  Colonic diverticulosis  APPENDIX:  No findings to suggest appendicitis  ABDOMINOPELVIC CAVITY:  Redemonstration of nonspecific retroperitoneal lymph nodes  No ascites  No pneumoperitoneum  VESSELS:  Atherosclerotic changes are present  No evidence of aneurysm  PELVIS    REPRODUCTIVE ORGANS:  The prostate is enlarged  URINARY BLADDER:  The bladder is not well-distended  There is abnormal diffuse wall thickening measuring 2 2 cm which is increased since prior when it was 1 7 cm  The appearance is nonspecific and may represent cystitis/ known cancer  Post treatment   changes can also be considered  ABDOMINAL WALL/INGUINAL REGIONS:  Unremarkable  OSSEOUS STRUCTURES:  No acute fracture or destructive osseous lesion  Spinal degenerative changes are noted  Impression: 1  Development of a few pulmonary nodules, the largest measuring 8 mm  A PET CT would be recommended  2   Worsening wall thickening of the urinary bladder which may represent post treatment changes  Cannot exclude cystitis or progression of cancer  The study was marked in EPIC for significant notification  Workstation performed: [id number]      LABS:  Lab data are reviewed and documented in HemOnc history         Lab Results   Component Value Date    HGB 9 6 (L) 05/05/2022    HCT 28 5 (L) 05/05/2022    MCV 96 05/05/2022     05/05/2022    WBC 5 65 05/05/2022    NRBC 0 05/04/2022    BANDSPCT 3 02/04/2022    ATYLMPCT 1 (H) 02/04/2022     Lab Results   Component Value Date    NA 137 09/03/2015    K 4 6 05/05/2022     05/05/2022    CO2 27 05/05/2022    ANIONGAP 5 09/03/2015    BUN 39 (H) 05/05/2022    CREATININE 2 79 (H) 05/05/2022    GLUCOSE 203 (H) 09/03/2015    GLUF 187 (H) 02/04/2022    CALCIUM 8 6 05/05/2022    CORRECTEDCA 9 5 05/05/2022    AST 46 (H) 05/05/2022    ALT 73 05/05/2022    ALKPHOS 125 (H) 05/05/2022    PROT 6 4 07/26/2015    BILITOT 0 50 07/26/2015    EGFR 20 05/05/2022       Lab Results   Component Value Date    IRON 186 (H) 12/10/2021    TIBC 377 12/10/2021    FERRITIN 23 12/10/2021    FERRITIN 40 10/13/2021    FERRITIN 31 06/22/2021    FERRITIN 23 02/17/2021    FERRITIN 52 12/19/2020    FERRITIN 73 09/19/2018       No results found for: [id number]    No results for input(s): WBC, CREAT in the last 72 hours      Invalid input(s):  PLT    By:  Judi Wing MD, 7/1/2022, 10:04 AM

## 2022-06-24 ENCOUNTER — APPOINTMENT (OUTPATIENT)
Dept: LAB | Facility: CLINIC | Age: 77
End: 2022-06-24
Payer: MEDICARE

## 2022-06-24 DIAGNOSIS — Z96.0 RETAINED URETERAL STENT: Chronic | ICD-10-CM

## 2022-06-24 DIAGNOSIS — I25.118 CORONARY ARTERY DISEASE OF NATIVE ARTERY OF NATIVE HEART WITH STABLE ANGINA PECTORIS (HCC): ICD-10-CM

## 2022-06-24 DIAGNOSIS — Z79.4 TYPE 2 DIABETES MELLITUS WITH STAGE 3 CHRONIC KIDNEY DISEASE, WITH LONG-TERM CURRENT USE OF INSULIN, UNSPECIFIED WHETHER STAGE 3A OR 3B CKD (HCC): ICD-10-CM

## 2022-06-24 DIAGNOSIS — N18.30 TYPE 2 DIABETES MELLITUS WITH STAGE 3 CHRONIC KIDNEY DISEASE, WITH LONG-TERM CURRENT USE OF INSULIN, UNSPECIFIED WHETHER STAGE 3A OR 3B CKD (HCC): ICD-10-CM

## 2022-06-24 DIAGNOSIS — E11.22 TYPE 2 DIABETES MELLITUS WITH STAGE 3 CHRONIC KIDNEY DISEASE, WITH LONG-TERM CURRENT USE OF INSULIN, UNSPECIFIED WHETHER STAGE 3A OR 3B CKD (HCC): ICD-10-CM

## 2022-06-24 DIAGNOSIS — N18.4 CKD (CHRONIC KIDNEY DISEASE) STAGE 4, GFR 15-29 ML/MIN (HCC): ICD-10-CM

## 2022-06-24 DIAGNOSIS — I73.9 PAD (PERIPHERAL ARTERY DISEASE) (HCC): ICD-10-CM

## 2022-06-24 DIAGNOSIS — N30.01 ACUTE CYSTITIS WITH HEMATURIA: ICD-10-CM

## 2022-06-24 LAB
BACTERIA UR QL AUTO: ABNORMAL /HPF
BILIRUB UR QL STRIP: NEGATIVE
CLARITY UR: ABNORMAL
COLOR UR: ABNORMAL
GLUCOSE UR STRIP-MCNC: ABNORMAL MG/DL
HGB UR QL STRIP.AUTO: ABNORMAL
KETONES UR STRIP-MCNC: NEGATIVE MG/DL
LEUKOCYTE ESTERASE UR QL STRIP: ABNORMAL
NITRITE UR QL STRIP: NEGATIVE
NON-SQ EPI CELLS URNS QL MICRO: ABNORMAL /HPF
PH UR STRIP.AUTO: 7 [PH]
PROT UR STRIP-MCNC: ABNORMAL MG/DL
RBC #/AREA URNS AUTO: ABNORMAL /HPF
SP GR UR STRIP.AUTO: 1.02 (ref 1–1.03)
UROBILINOGEN UR STRIP-ACNC: <2 MG/DL
WBC #/AREA URNS AUTO: ABNORMAL /HPF

## 2022-06-24 PROCEDURE — 87086 URINE CULTURE/COLONY COUNT: CPT

## 2022-06-24 PROCEDURE — 81001 URINALYSIS AUTO W/SCOPE: CPT | Performed by: INTERNAL MEDICINE

## 2022-06-25 LAB — BACTERIA UR CULT: NORMAL

## 2022-06-28 ENCOUNTER — HOSPITAL ENCOUNTER (OUTPATIENT)
Dept: NUCLEAR MEDICINE | Facility: HOSPITAL | Age: 77
Discharge: HOME | End: 2022-06-28
Attending: INTERNAL MEDICINE
Payer: MEDICARE

## 2022-06-28 ENCOUNTER — TELEPHONE (OUTPATIENT)
Dept: UROLOGY | Facility: MEDICAL CENTER | Age: 77
End: 2022-06-28

## 2022-06-28 DIAGNOSIS — C67.3 MALIGNANT NEOPLASM OF ANTERIOR WALL OF URINARY BLADDER (HCC): ICD-10-CM

## 2022-06-28 LAB — GLUCOSE SERPL-MCNC: 141 MG/DL (ref 65–140)

## 2022-06-28 PROCEDURE — G1004 CDSM NDSC: HCPCS

## 2022-06-28 PROCEDURE — 78815 PET IMAGE W/CT SKULL-THIGH: CPT

## 2022-06-28 PROCEDURE — A9552 F18 FDG: HCPCS

## 2022-06-28 PROCEDURE — 82948 REAGENT STRIP/BLOOD GLUCOSE: CPT

## 2022-06-28 NOTE — TELEPHONE ENCOUNTER
----- Message from Chris Garay RN sent at 6/27/2022  7:14 AM EDT -----  Regarding: nephrostomy supplies  Hi Stacey Bolus for they delayed response as I have been out of the office  We have spoke with Mr Emir Doll on many, many occasions regarding his nephrostomy tube supplies  We have submitted all the necessary paperwork to the [place] for them to supply him  I have spoke with Bebe Cardenas over there who confirms he is getting what he needs including home care  The stat locks unfortunately they are unable to order for him, and they are not something that we order or supply here, they come when a tube is opened not individually  This has been relayed to him and he voiced an understanding of such  I am not sure why he keeps reaching out to others for supplies when all of his requests for them have been filled  If you have any questions please reach out to us        Thank you,  Gonzalo Fermin

## 2022-06-30 ENCOUNTER — TELEPHONE (OUTPATIENT)
Dept: HEMATOLOGY ONCOLOGY | Facility: CLINIC | Age: 77
End: 2022-06-30

## 2022-06-30 NOTE — TELEPHONE ENCOUNTER
Called the radiology reading room to have patient's scan read before his appointment with Sawyer Hernandes tomorrow 7/1

## 2022-07-01 ENCOUNTER — OFFICE VISIT (OUTPATIENT)
Dept: HEMATOLOGY ONCOLOGY | Facility: CLINIC | Age: 77
End: 2022-07-01
Payer: MEDICARE

## 2022-07-01 ENCOUNTER — TELEPHONE (OUTPATIENT)
Dept: HEMATOLOGY ONCOLOGY | Facility: CLINIC | Age: 77
End: 2022-07-01

## 2022-07-01 ENCOUNTER — APPOINTMENT (OUTPATIENT)
Dept: LAB | Facility: CLINIC | Age: 77
End: 2022-07-01
Payer: MEDICARE

## 2022-07-01 VITALS
BODY MASS INDEX: 32.37 KG/M2 | DIASTOLIC BLOOD PRESSURE: 76 MMHG | SYSTOLIC BLOOD PRESSURE: 132 MMHG | HEART RATE: 76 BPM | HEIGHT: 72 IN | WEIGHT: 239 LBS | RESPIRATION RATE: 20 BRPM | OXYGEN SATURATION: 99 %

## 2022-07-01 DIAGNOSIS — I70.209 ARTERIOSCLEROSIS OF ARTERY OF EXTREMITY (HCC): ICD-10-CM

## 2022-07-01 DIAGNOSIS — E11.42 DIABETIC POLYNEUROPATHY ASSOCIATED WITH TYPE 2 DIABETES MELLITUS (HCC): ICD-10-CM

## 2022-07-01 DIAGNOSIS — N18.4 CKD (CHRONIC KIDNEY DISEASE) STAGE 4, GFR 15-29 ML/MIN (HCC): Chronic | ICD-10-CM

## 2022-07-01 DIAGNOSIS — N30.01 ACUTE CYSTITIS WITH HEMATURIA: ICD-10-CM

## 2022-07-01 DIAGNOSIS — C67.9 BLADDER CARCINOMA (HCC): ICD-10-CM

## 2022-07-01 DIAGNOSIS — Z79.4 TYPE 2 DIABETES MELLITUS WITH STAGE 3 CHRONIC KIDNEY DISEASE, WITH LONG-TERM CURRENT USE OF INSULIN, UNSPECIFIED WHETHER STAGE 3A OR 3B CKD (HCC): ICD-10-CM

## 2022-07-01 DIAGNOSIS — I25.118 CORONARY ARTERY DISEASE OF NATIVE ARTERY OF NATIVE HEART WITH STABLE ANGINA PECTORIS (HCC): ICD-10-CM

## 2022-07-01 DIAGNOSIS — N18.30 TYPE 2 DIABETES MELLITUS WITH STAGE 3 CHRONIC KIDNEY DISEASE, WITH LONG-TERM CURRENT USE OF INSULIN, UNSPECIFIED WHETHER STAGE 3A OR 3B CKD (HCC): ICD-10-CM

## 2022-07-01 DIAGNOSIS — I73.9 PAD (PERIPHERAL ARTERY DISEASE) (HCC): ICD-10-CM

## 2022-07-01 DIAGNOSIS — Z96.0 RETAINED URETERAL STENT: Chronic | ICD-10-CM

## 2022-07-01 DIAGNOSIS — C67.3 MALIGNANT NEOPLASM OF ANTERIOR WALL OF URINARY BLADDER (HCC): Primary | ICD-10-CM

## 2022-07-01 DIAGNOSIS — E11.22 TYPE 2 DIABETES MELLITUS WITH STAGE 3 CHRONIC KIDNEY DISEASE, WITH LONG-TERM CURRENT USE OF INSULIN, UNSPECIFIED WHETHER STAGE 3A OR 3B CKD (HCC): ICD-10-CM

## 2022-07-01 LAB
ALBUMIN SERPL BCP-MCNC: 3.1 G/DL (ref 3.5–5)
ALP SERPL-CCNC: 95 U/L (ref 46–116)
ALT SERPL W P-5'-P-CCNC: 50 U/L (ref 12–78)
ANION GAP SERPL CALCULATED.3IONS-SCNC: 5 MMOL/L (ref 4–13)
AST SERPL W P-5'-P-CCNC: 42 U/L (ref 5–45)
BASOPHILS # BLD AUTO: 0.03 THOUSANDS/ΜL (ref 0–0.1)
BASOPHILS NFR BLD AUTO: 0 % (ref 0–1)
BILIRUB SERPL-MCNC: 0.25 MG/DL (ref 0.2–1)
BUN SERPL-MCNC: 45 MG/DL (ref 5–25)
CALCIUM ALBUM COR SERPL-MCNC: 9.4 MG/DL (ref 8.3–10.1)
CALCIUM SERPL-MCNC: 8.7 MG/DL (ref 8.3–10.1)
CHLORIDE SERPL-SCNC: 107 MMOL/L (ref 100–108)
CO2 SERPL-SCNC: 28 MMOL/L (ref 21–32)
CREAT SERPL-MCNC: 3.07 MG/DL (ref 0.6–1.3)
CRP SERPL QL: 5.2 MG/L
EOSINOPHIL # BLD AUTO: 0.23 THOUSAND/ΜL (ref 0–0.61)
EOSINOPHIL NFR BLD AUTO: 3 % (ref 0–6)
ERYTHROCYTE [DISTWIDTH] IN BLOOD BY AUTOMATED COUNT: 14.1 % (ref 11.6–15.1)
ERYTHROCYTE [SEDIMENTATION RATE] IN BLOOD: 39 MM/HOUR (ref 0–19)
EST. AVERAGE GLUCOSE BLD GHB EST-MCNC: 157 MG/DL
GFR SERPL CREATININE-BSD FRML MDRD: 18 ML/MIN/1.73SQ M
GLUCOSE P FAST SERPL-MCNC: 128 MG/DL (ref 65–99)
HBA1C MFR BLD: 7.1 %
HCT VFR BLD AUTO: 28.7 % (ref 36.5–49.3)
HGB BLD-MCNC: 9.2 G/DL (ref 12–17)
IMM GRANULOCYTES # BLD AUTO: 0.02 THOUSAND/UL (ref 0–0.2)
IMM GRANULOCYTES NFR BLD AUTO: 0 % (ref 0–2)
LYMPHOCYTES # BLD AUTO: 1.34 THOUSANDS/ΜL (ref 0.6–4.47)
LYMPHOCYTES NFR BLD AUTO: 19 % (ref 14–44)
MAGNESIUM SERPL-MCNC: 2.4 MG/DL (ref 1.6–2.6)
MCH RBC QN AUTO: 30.6 PG (ref 26.8–34.3)
MCHC RBC AUTO-ENTMCNC: 32.1 G/DL (ref 31.4–37.4)
MCV RBC AUTO: 95 FL (ref 82–98)
MONOCYTES # BLD AUTO: 0.6 THOUSAND/ΜL (ref 0.17–1.22)
MONOCYTES NFR BLD AUTO: 9 % (ref 4–12)
NEUTROPHILS # BLD AUTO: 4.83 THOUSANDS/ΜL (ref 1.85–7.62)
NEUTS SEG NFR BLD AUTO: 69 % (ref 43–75)
NRBC BLD AUTO-RTO: 0 /100 WBCS
PLATELET # BLD AUTO: 166 THOUSANDS/UL (ref 149–390)
PMV BLD AUTO: 11.4 FL (ref 8.9–12.7)
POTASSIUM SERPL-SCNC: 4.7 MMOL/L (ref 3.5–5.3)
PROT SERPL-MCNC: 7.3 G/DL (ref 6.4–8.2)
RBC # BLD AUTO: 3.01 MILLION/UL (ref 3.88–5.62)
SODIUM SERPL-SCNC: 140 MMOL/L (ref 136–145)
TSH SERPL DL<=0.05 MIU/L-ACNC: 2.73 UIU/ML (ref 0.45–4.5)
WBC # BLD AUTO: 7.05 THOUSAND/UL (ref 4.31–10.16)

## 2022-07-01 PROCEDURE — 83036 HEMOGLOBIN GLYCOSYLATED A1C: CPT

## 2022-07-01 PROCEDURE — 85652 RBC SED RATE AUTOMATED: CPT

## 2022-07-01 PROCEDURE — 83735 ASSAY OF MAGNESIUM: CPT

## 2022-07-01 PROCEDURE — 86140 C-REACTIVE PROTEIN: CPT

## 2022-07-01 PROCEDURE — 80053 COMPREHEN METABOLIC PANEL: CPT

## 2022-07-01 PROCEDURE — 85025 COMPLETE CBC W/AUTO DIFF WBC: CPT

## 2022-07-01 PROCEDURE — 84443 ASSAY THYROID STIM HORMONE: CPT

## 2022-07-01 PROCEDURE — 36415 COLL VENOUS BLD VENIPUNCTURE: CPT

## 2022-07-01 PROCEDURE — 99214 OFFICE O/P EST MOD 30 MIN: CPT | Performed by: INTERNAL MEDICINE

## 2022-07-01 RX ORDER — SODIUM CHLORIDE 9 MG/ML
20 INJECTION, SOLUTION INTRAVENOUS ONCE
Status: CANCELLED | OUTPATIENT
Start: 2022-07-15

## 2022-07-06 ENCOUNTER — TELEPHONE (OUTPATIENT)
Dept: HEMATOLOGY ONCOLOGY | Facility: CLINIC | Age: 77
End: 2022-07-06

## 2022-07-06 NOTE — TELEPHONE ENCOUNTER
CALL RETURN FORM   Reason for patient call? Granddaughter of patient calling about plan of care for patient  Patient's primary oncologist? Maxwell Morris    Name of person the patient was calling for? Jaquelin   Any additional information to add, if applicable? Please call [phone]   Informed patient that the message will be forwarded to the team and someone will get back to them as soon as possible    Did you relay this information to the patient?  yes

## 2022-07-06 NOTE — TELEPHONE ENCOUNTER
Doris returned my call  She stated she would prefer to speak to Dr [place] directly instead of having a "middle man" as she might have follow-up questions

## 2022-07-06 NOTE — PROGRESS NOTES
Hematology/Oncology Outpatient Office Note    Date of Service: 7/15/2022    [facility] HEMATOLOGY ONCOLOGY SPECIALISTS CELSO Castillonorman  [facility]  [phone]    Reason for Consultation:   Chief Complaint   Patient presents with    Follow-up       Cancer Stage at diagnosis: II, interval progression to Stage IV    Referral Physician: No ref  provider found    Primary Care Physician:  Lena Cunningham MD     Nickname: Krystian Hartman    Spouse: Anurag Bertrand    Granddaughter: Sil Pritchard     Original ECO    Today's ECO    Goals and Barriers:  Current Goal: Minimize effects of disease burden, extend life  Barriers to accomplishing this: fatigue, depression, anxiety, worry, L foot claudication, lower back pain from spinal stenosis and uses a cane a lot of the time    Patient's Capacity to Self Care:  Patient is able to self care    Code Status: Full code    Advanced directives: not on file but I recommended he get that done    ASSESSMENT & PLAN      Diagnosis ICD-10-CM Associated Orders   1  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)  C67 3        This is a 76 y o  c PMHx notable for spinal stenosis, Gastric bypass (), 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, diabetic nephropathy, and renal vascular disease, DM, being seen in consultation for bladder cancer       From an overall performance status basis, I believe Nehemiah Abdi can tolerate systemic treatment due to an ECOG PS of 2  A shared decision making approach was employed during today's visit  Thoughts on approach to systemic therapy in the first line and subsequent lines of therapy:    My favored first line is cisplatin/ gemcitabine however the patient's overall performance status and kidney function is not amenable to this      Thoughts on prediction for Grade III-V toxicity in elderly patients to systemic chemotherapy:  Age >72 years   GI/ cancers  Falls in last 6 months   Hearing impairment (b/l hearing aids)  Limited ability to walk 1 block  Requires assistance with medications  Decreased social activities   Pattie Buck JCO 2011)  BMI<18 5 or moderate or severe weight loss or decrease in food intake in past 3 months  Mild vs moderate dementia/depression     The above 5 factors predict toxicity for the patient if he undergoes systemic chemotherapy and I discussed this with the patient  For unresectable cases: ICI can be employed if there is POD after definitive CRT    Discussion of disease response monitoring: We discussed with the patient at length the role of imaging and potential blood monitoring of burden of disease  We will opt to get CT chest, abdomen, pelvis without contrast due to kidney function as surveillance following conclusion of therapy  As there has been progression of disease, our plan is to employ liquid biopsy to assess actionable biomarkers and determine updated bio-marker status  1/27/22 pt only got 1/2 of Days 1-5 5-FU due to contents spilling  Pt completed concurrent 5-FU/MTC + RT and found to have POD on 6/28/2022 PET/CT (lung mets)  Discussion of decision making   Oncology history updated, accordingly, during this visit   Goals of care/patient communication  o I discussed with the patient the clinical course leading up to their cancer diagnosis  I reviewed relevant office notes, imaging reports and pathology result as well   o I told the patient that this is a case of potentially curable disease and what this means  We discussed that the goal of anti-cancer therapy is to provide best quality of life, extend overall survival, and progression free survival as shown in clinical trials   We also discussed that there might be a point when the cancer will no longer respond to this anti-neoplastic therapy    o I explained the risks/benefits of the proposed cancer therapy: Malcolm Hernandez and after discussion including understanding risks of possible life-threatening complications and therapy-related malignancy development, informed consent for treatment has been signed   TNM/Staging At Diagnosis  Cancer Staging  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)  Staging form: Urinary Bladder, AJCC 8th Edition  - Clinical stage from 10/18/2021: Stage II (cT2, cN0, cM0) - Signed by Yumiko Benedict MD on 11/17/2021  Stage prefix: Initial diagnosis  o    Disease Features/Tumor Markers/Genetics  o Tumor Marker: n/a  o Notable Path Features: anterior wall Invasive high-grade urothelial carcinoma  Carcinoma invades muscularis propria (detrusor muscle)    Treatment:  Alric Gemma Other Supportive care:    Treatment Team Members  o Surgeon: Dr Brandon Silva: Dr Lakia Tariq  o Palliative: April Simmons   Labs: creat 2 81 (eGFR 24), normocytic anemia, Hgb 9 8   Diagnostics: 11/11/21 CT CAP w/o c: 1  No metastatic disease within the limitations of noncontrast technique in the chest abdomen or pelvis  2   Diffuse hemicircumferential smooth bladder wall thickening on the left, similar to the prior study when allowing for differences in bladder distention  Prostatomegaly  6/3/2022 CT CAP w/o c: Development of a few pulmonary nodules, the largest measuring 8 mm  A PET CT would be recommended  Worsening wall thickening of the urinary bladder which may represent post treatment changes  Cannot exclude cystitis or progression of cancer  6/28/2022 PET/CT: read pending, per my assessment lung metastatic hypermetabolic nodules as well as L chest wall nodule    Port is without acute issues  Discussion of decision making    I personally communicated with Dr Lakia Tariq on this case and have reviewed the following lab results, the image studies, pathology, other specialty/physicians consult notes and recommendations, and outside medical records from [facility]  I had a lengthy discussion with the patient and shared the work-up findings   We discussed the diagnosis and management plan as below  I spent 34minutes reviewing the records (labs, clinician notes, outside records, medical history, ordering medicine/tests/procedures, interpreting the imaging/labs previously done) and coordination of care as well as direct time with the patient today, of which greater than 50% of the time was spent in counseling and coordination of care with the patient/family  · Plan/Labs  · Continue to assess for signs of distress as he has anxiety/depression  F/u Palliative for cancer associated pain  · F/u Urology for care and management of neph tubes  · Infusion chairs scheduled for Keytruda q6 weeks up until 8/26/2022 until further POD  Labs to be obtained prior to each cycle (TSH, CMP, CBC)  C1 coming up today  · Pt getting assessment by Thoracic surgery and is deemed unresectable   · Guardant 360 testing in process  · Restaging CT CAP in 3 months scheduled 10/3/2022      Follow Up:  q6 weeks thereafter    All questions were answered to the patient's satisfaction during this encounter  The patient knows the contact information for our office and knows to reach out for any relevant concerns related to this encounter  They are to call for any temperature 100 4 or higher, new symptoms including but not restricted to shaking chills, decreased appetite, nausea, vomiting, diarrhea, increased fatigue, shortness of breath or chest pain, confusion, and not feeling the strength to come to the clinic  For all other listed problems and medical diagnosis in their chart - they are managed by PCP and/or other specialists, which the patient acknowledges  Thank you very much for your consultation and making us a part of this patient's care  We are continuing to follow closely with you  Please do not hesitate to reach out to me with any additional questions or concerns  Carol Duque MD  Hematology & Medical Oncology Staff Physician             Disclaimer: This document was prepared using [employer] technology  If a word or phrase is confusing, or does not make sense, this is likely due to recognition error which was not discovered during this clinician's review  If you believe an error has occurred, please contact me through [address] line for godfrey? 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)    Initial Diagnosis    Malignant neoplasm of anterior wall of urinary bladder ([address] )     1994 Surgery    TURBT      1994 - 1995 Chemotherapy    Induction intravesical BCG x 2      8/17/2016 Surgery    TURBT      12/13/2016 Surgery    TURBT  [facility])    Bladder, left posterior wall, biopsy: High-grade urothelial carcinoma in-situ  Muscularis propria is identified with no tumor seen  Bladder, trigone, biopsy: Non-invasive high-grade papillary urothelial carcinoma, and flat urothelial carcinoma in-situ  Muscularis propria is not identified  1/4/2017 - 2/8/2017 Chemotherapy    Induction intravesical BCG     6/19/2017 Surgery    TURBT  [facility])    - Posterior wall, biopsy: Mild chronic cystitis with focal urothelial atypia  Muscularis propria is identified      - Right lateral wall, biopsy: Granulomatous cystitis  Muscularis propria is identified  - Left lateral wall, biopsy: Non invasive high-grade urothelial carcinoma  Muscularis propria is not identified  4/26/2019 Surgery    TURBT   [facility])     - bladder, right posterior wall, biopsy: Urothelial carcinoma in situ   - bladder, right lateral wall, biopsy: Small focus of urothelial carcinoma in situ  - bladder, left posterior wall, biopsy: Urothelial carcinoma in situ   - bladder, left lateral wall, biopsy: Urothelial carcinoma in situ     - bladder, trigone, biopsy: Invasive high-grade urothelial carcinoma, invading into lamina propria  No lymphovascular invasion seen  Muscularis propria not present  Separate piece showing urothelial carcinoma in situ  7/2019 -  Chemotherapy    Induction intravesical BCG x 4      11/4/2019 Surgery    TURBT  [facility])    - Superficial bladder tumor, transurethral resection: Non-invasive high grade urothelial carcinoma, with urothelial carcinoma in situ  Muscularis propria is not identified      - Bladder tumor, transurethral resection: Non-invasive high grade urothelial carcinoma, with urothelial carcinoma in situ, see note  Muscularis propria is present and free of tumor  12/9/2019 - 1/28/2020 Chemotherapy    Induction intravesical BCG+INF        6/9/2020 - 6/23/2020 Chemotherapy    Maintenance intravesical BCG+INF        11/19/2020 Biopsy    TURBT (Cecelia Gonzalez, Dr Sandra Lincoln)    A  Urinary Bladder, Left Posterior Bladder Wall:  -Extensively- denuded urothelial lined mucosa with mild chronic inflammation, vascular congestion  And edema   -Unremarkable small fragment of detrusor muscle present  -No evidence of invasive urothelial carcinoma seen     B  Urinary Bladder, Left lateral bladder Wall:  -Partially-denuded urothelial lined mucosa with mild  chronic inflammation  -Unremarkable small fragment of detrusor muscle present  -No evidence of invasive urothelial carcinoma seen     C  Urinary Bladder, Right Posterior Bladder wall:  -Urothelial lined mucosa with mild  chronic inflammation Von Brunn nests and mild urothelial atypia, possibly due to previous BCG administration   -Unremarkable small fragment of detrusor muscle present  -No evidence of invasive urothelial carcinoma seen     D  Urinary Bladder, Right Lateral Bladder Wall:  - Urothelial lined mucosa with mild  chronic inflammation   -Muscularis propria/ detrusor muscle is not present for evaluation    -No evidence of invasive urothelial carcinoma seen         E  Prostate, Prostate Tissue:  -Urothelial lined mucosa with mild chronic inflammation, prominent Von Brunn nests and Cystitis cystica   -Unremarkable small fragment of detrusor muscle present   -No evidence of malignancy seen  10/18/2021 -  Cancer Staged    Staging form: Urinary Bladder, AJCC 8th Edition  - Clinical stage from 10/18/2021: Stage II (cT2, cN0, cM0) - Signed by Chase Guaman MD on 11/17/2021  Stage prefix: Initial diagnosis       10/18/2021 Surgery    TURBT ([facility])    A  Left posterior wall, bladder (transurethral resection):     - Urothelial tissue with small atypical cauterized focus favored to represent superficially invasive high-grade urothelial carcinoma  - Muscularis propria (detrusor muscle) present, and negative for carcinoma  - Marked edema and mucosal denudation with thermal artifact noted  B  Anterior wall, bladder (transurethral resection):      - Invasive high-grade urothelial carcinoma  - Carcinoma invades muscularis propria (detrusor muscle)  C   Left lateral wall, bladder (transurethral resection):     - Urothelial tissue with small atypical focus with marked thermal artifact; cannot exclude superficial carcinoma  - Muscularis propria (detrusor muscle) present, and negative for carcinoma        - Marked edema and mucosal denudation with thermal artifact noted     1/24/2022 - 3/26/2022 Chemotherapy    mitoMYcin (MUTAMYCIN), 12 mg/m2 = 28 7 mg (100 % of original dose 12 mg/m2), Intravenous, Once, 1 of 1 cycle  Dose modification: 12 mg/m2 (original dose 12 mg/m2, Cycle 1), 3 mg/m2 (original dose 12 mg/m2, Cycle 1)  Administration: 7 2 mg (1/24/2022)  fluorouracil (ADRUCIL) ambulatory infusion Soln, 500 mg/m2/day = 4,780 mg (50 % of original dose 1,000 mg/m2/day), Intravenous, Over 96 hours, 1 of 1 cycle, Start date: 1/18/2022, End date: 1/23/2022  Dose modification: 500 mg/m2/day (original dose 1,000 mg/m2/day, Cycle 1, Reason: Dose modified as per discussion with consulting physician)     1/24/2022 - 3/24/2022 Radiation    Pelvis:1 6X 21 / 21 275 0 5,775 59      Treatment dates:  C1: 1/24/2022 - 3/24/2022     7/15/2022 -  Chemotherapy pembrolizumab (KEYTRUDA) IVPB, 400 mg, Intravenous, Once, 0 of 6 cycles       He had been getting BCG vaccine for his bladder since 1994 6/28/2022 PET/CT: read pending, per my assessment lung metastatic hypermetabolic nodules as well as L chest wall nodule    SUBJECTIVE  (INTERVAL HISTORY)      Clotting History None   Bleeding History No major events   Cancer History Bladder   Family Cancer History None   H/O Blood/Plt Transfusion None   Tobacco/etoh/drug abuse 1 5 PPD x 17 years (quit 1970), no other abuse   Hx COVID19 Infection and Vaccine Status Pfizer x 3 (had booster 9/2021)   Cancer Screening history n/a   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)   New medications in the last month: PO iron daily (recommended MWF)  Pain: dysuria (since 10/2021 bladder procedure), LBP, L foot pain s/p remote toe amputation     His maximum weight prior to the gastric bypass that he had was 330lbs  I have reviewed the relevant past medical, surgical, social and family history  I have also reviewed allergies and medications for this patient  Interval events:   Having L sided chest pain with deep inspiration since late May 2022  Stable energy level, appetite, weight  Otherwise, doing well  Denies falls, gen weakness  Review of Systems  Denies unintentional weight loss, F/C, N/V, CP, diarrhea, constipation, rash, itching, melena, hematuria, hematochezia  Chronic mild SOB with exertion, intermittent anxiety and depression that has been worsening since his cancer diagnosis  He has had fatigue since 2/2021 (his discharge from the hospital for LLE toe amputation)  No new issues although he has been dealing with his chronic back pain over the past year  A 10-point review of system was performed, pertinent positive and negative were detailed as above  Otherwise, the 10-point review of system was negative        Past Medical History:   Diagnosis Date    Anemia Last assessed: 9/28/17    Anxiety     Arteriosclerotic cardiovascular disease     Last assessed: 9/28/17    Arthritis     Bladder cancer ([facility])     bladder- had BCG treatments    Chronic kidney disease     Stage IV    CKD (chronic kidney disease) stage 4, GFR 15-29 ml/min ([facility])     Colon polyp     Coronary artery disease     7 stents    Depression     Diabetes mellitus ([facility])     IDDM    GERD (gastroesophageal reflux disease)     Glaucoma     Hematuria     History of fusion of cervical spine     Hyperlipidemia     Hypertension     Insomnia     Last assessed: 11/14/12    Loss of hearing     has hearing aids but usually does not wear them    Other seasonal allergic rhinitis     Last assessed: 2/10/16    PAD (peripheral artery disease) ([facility])     Shortness of breath     on exertion    Spinal stenosis of lumbar region     Transient cerebral ischemia     No Residual    Uses walker     w/c for longer distances       Past Surgical History:   Procedure Laterality Date    CARDIAC SURGERY      Cath stent placement  Last assessed: 3/9/17  Interventional Catheterization    CHOLECYSTECTOMY      COLONOSCOPY      CYSTOSCOPY      Diagnostic w/biopsy  Ashok Valencia  Last assessed: 12/1/14    CYSTOSCOPY N/A 4/12/2022    Procedure: CYSTOSCOPY  Bladder biopsies  ;  Surgeon: Alecia Webb MD;  [facility];  Service: Urology    CYSTOSCOPY W/ RETROGRADES Right 3/1/2022    Procedure: CYSTO; stent removal retrograde;  Surgeon: Alecia Webb MD;  [facility];  Service: Urology    CYSTOSCOPY W/ URETERAL STENT PLACEMENT Bilateral 10/18/2021    Procedure: bilateral retrogrades, cytology collection;  Surgeon: Alecia Webb MD;  [facility];  Service: Urology    CYSTOURETHROSCOPY      w/cautery  Ashok Valencia    FL RETROGRADE PYELOGRAM  10/18/2021    FL RETROGRADE PYELOGRAM  10/24/2021    GASTRIC BYPASS      For morbid obesity w/Shaji-en-Y   Resolved: 11/17/09    INCISION AND DRAINAGE OF WOUND Right 2/26/2017    Procedure: INCISION AND DRAINAGE (I&D) EXTREMITY WITH APPLICATION OF GRAFT JACKET;  Surgeon: Dayanna Forrest DPM;  [facility];  Service:     INCISION AND DRAINAGE OF WOUND Right 4/25/2017    Procedure: INCISION AND DRAINAGE (I&D) EXTREMITY, APPLICATION OF GRAFT;  Surgeon: Dayanna Forrest DPM;  [facility];  Service:     IR BIOPSY OTHER  7/2/2020    IR LOWER EXTREMITY ANGIOGRAM  2/8/2021    IR LOWER EXTREMITY ANGIOGRAM  2/11/2021    IR NEPHROSTOMY TUBE CHECK/CHANGE/REPOSITION/REINSERTION/UPSIZE  4/28/2022    IR NEPHROSTOMY TUBE CHECK/CHANGE/REPOSITION/REINSERTION/UPSIZE  5/24/2022    IR NEPHROSTOMY TUBE CHECK/CHANGE/REPOSITION/REINSERTION/UPSIZE  6/7/2022    IR NEPHROSTOMY TUBE PLACEMENT  2/25/2022    IR PORT PLACEMENT  1/17/2022    IR TUNNELED CENTRAL LINE PLACEMENT  12/24/2020    JOINT REPLACEMENT      christofer knees replaced    NE AMPUTATION METATARSAL+TOE,SINGLE Left 12/21/2020    Procedure: RAY RESECTION FOOT;  Surgeon: Jaquelin Chavez DPM;  [facility];  Service: Podiatry    NE AMPUTATION METATARSAL+TOE,SINGLE Left 12/31/2020    Procedure: 5TH MET RESECTION;  Surgeon: Jaquelin Chavez DPM;  [facility];  Service: Podiatry    NE CYSTOURETHROSCOPY W/IRRIG & EVAC CLOTS N/A 2/10/2021    Procedure: CYSTOSCOPY EVACUATION OF CLOTS, fulguration;  Surgeon: Pam Cardenas MD;  [facility];  Service: Urology    NE CYSTOURETHROSCOPY W/IRRIG & EVAC CLOTS N/A 10/24/2021    Procedure: CYSTOSCOPY EVACUATION OF CLOT, fulguration of bleeding vessels, right ureter stent placement, retrograde pyelogram;  Surgeon: Dennis Landaverde MD;  [facility];  Service: Urology    NE CYSTOURETHROSCOPY,BIOPSY N/A 8/16/2016    Procedure: CYSTOSCOPY WITH BIOPSIES;  Surgeon: Macey Asif MD;  [facility];  Service: Urology    NE CYSTOURETHROSCOPY,FULGUR <0 5 CM LESN N/A 11/19/2020    Procedure: CYSTO W/BIOPSIES, transurethral prostate bx;  Surgeon: José Miguel Hawkins MD;  [facility];  Service: Urology    TX CYSTOURETHROSCOPY,FULGUR >5 CM LESN Bilateral 10/18/2021    Procedure: TRANSURETHRAL RESECTION OF BLADDER TUMOR (TURBT);   Surgeon: Alesia Hendricks MD;  [facility];  Service: Urology    TX Karina Mercury 3RD+ ORD [address] PEL/LXTR [facility] Left 2021    Procedure: LEG angiogram, CO2 w/limited contrast with balloon angioplasty postertior tibial artery;  Surgeon: Valentino Slates, MD;  [facility];  Service: Vascular    ROTATOR CUFF REPAIR      SMALL INTESTINE SURGERY      Surgery Shaji-en-Y    SPINAL FUSION      lumbar and cervical fusions    VAC DRESSING APPLICATION Right     Procedure: APPLICATION VAC DRESSING;  Surgeon: Alejandra Vail DPM;  [facility];  Service:    [address] [place] [address] Left 2021    Procedure: FOOT DEBRIDE, [address] OUT w/graft application;  Surgeon: Alejandra Vail DPM;  [facility];  Service: Podiatry       Family History   Problem Relation Age of Onset    Diabetes Mother     Heart disease Mother     Other Mother         High blood pressure    Heart disease Father     Diabetes Sister     Other Sister         High blood pressure    Kidney disease Sister     Heart disease Brother     Other Brother         High blood pressure       Social History     Socioeconomic History    Marital status: /Civil Union     Spouse name: Not on file    Number of children: Not on file    Years of education: Not on file    Highest education level: Not on file   Occupational History    Not on file   Tobacco Use    Smoking status: Former Smoker     Packs/day: 3 00     Years: 27 00     Pack years: 81 00     Types: Cigarettes     Quit date: 1970     Years since quittin 5    Smokeless tobacco: Never Used   Vaping Use    Vaping Use: Never used   Substance and Sexual Activity    Alcohol use: Never     Comment: beer / liquor    Drug use: Not Currently Types:  Marijuana     Comment: quit 2019 had medical marijuana    Sexual activity: Not Currently   Other Topics Concern    Not on file   Social History Narrative    Consumes 1 cup of coffee and 1 soda per day     Social Determinants of Health     Financial Resource Strain: Not on file   Food Insecurity: Not on file   Transportation Needs: Not on file   Physical Activity: Not on file   Stress: Not on file   Social Connections: Not on file   Intimate Partner Violence: Not on file   Housing Stability: Not on file       Allergies   Allergen Reactions    Atorvastatin Hives, Itching and Rash    Simvastatin Rash and Edema     Edema of lower legs    Statins Hives and Itching    Insulin Lispro Swelling and Edema     " Lower Legs"    Other Itching, Rash and Other (See Comments)     "EKG Patches"   "blue EKG patches"       Current Outpatient Medications   Medication Sig Dispense Refill    Accu-Chek Softclix Lancets lancets Test blood sugar 4 times daily 200 each 0    acetaminophen (TYLENOL) 325 mg tablet Take 650 mg by mouth every 6 (six) hours as needed for mild pain        ALPRAZolam (XANAX) 0 25 mg tablet At twice a day as needed for anxiety 30 tablet 0    aspirin (ECOTRIN LOW STRENGTH) 81 mg EC tablet Take 1 tablet (81 mg total) by mouth daily      Azelastine HCl 0 15 % SOLN Inhale 1 spray 2 (two) times a day 30 mL 3    Bimatoprost (LUMIGAN OP) Apply 1 drop to eye daily at bedtime       calcitriol (ROCALTROL) 0 25 mcg capsule Take 1 capsule (0 25 mcg total) by mouth daily 90 capsule 0    docusate sodium (COLACE) 100 mg capsule Take 1 capsule (100 mg total) by mouth 2 (two) times a day 100 capsule 0    DULoxetine (CYMBALTA) 30 mg delayed release capsule Take 1 capsule (30 mg total) by mouth daily 90 capsule 0    ezetimibe (ZETIA) 10 mg tablet Take 1 tablet (10 mg total) by mouth daily in the early morning 90 tablet 0    fluocinonide (LIDEX) 0 05 % cream Apply topically 2 (two) times a day (Patient taking differently: Apply topically as needed) 30 g 0    fluticasone (FLONASE) 50 mcg/act nasal spray 1 spray into each nostril daily (Patient taking differently: 1 spray into each nostril as needed) 11 mL 1    furosemide (LASIX) 20 mg tablet Take 1 tablet (20 mg total) by mouth daily 90 tablet 1    gabapentin (Neurontin) 300 mg capsule Take 1 capsule (300 mg total) by mouth daily at bedtime 90 capsule 0    glucose blood (Accu-Chek Martha Plus) test strip Test blood sugar 4 times daily 200 strip 0    Insulin Pen Needle 31G X 8 MM MISC Inject 3 times a day 100 each 2    isosorbide mononitrate (IMDUR) 30 mg 24 hr tablet Take 1 tablet (30 mg total) by mouth daily in the early morning 90 tablet 0    Lantus SoloStar 100 units/mL injection pen 18 units qhs (Patient taking differently: Inject 18 Units under the skin daily at bedtime) 30 mL 1    lidocaine-prilocaine (EMLA) cream Apply topically as needed for mild pain 30 g 0    loperamide (IMODIUM) 2 mg capsule Take 2 mg by mouth 4 (four) times a day as needed for diarrhea      multivitamin (THERAGRAN) TABS Take 1 tablet by mouth daily        NovoLOG FlexPen 100 units/mL injection pen 10 units with each meal  (Patient taking differently: Inject 10 Units under the skin 3 (three) times a day with meals) 5 pen 1    omeprazole (PriLOSEC) 20 mg delayed release capsule Take 20 mg by mouth daily in the early morning PRN       oxybutynin (DITROPAN-XL) 10 MG 24 hr tablet Take 1 tablet (10 mg total) by mouth daily 30 tablet 0    oxyCODONE (OxyCONTIN) 10 mg 12 hr tablet Take 1 tablet (10 mg total) by mouth every 8 (eight) hours Max Daily Amount: 30 mg 90 tablet 0    oxyCODONE (Roxicodone) 5 immediate release tablet Take 1-1 5 tablets (5-7 5 mg total) by mouth every 4 (four) hours as needed for moderate pain (1 tab for moderate pain 1 5 tab for severe pain ) Max Daily Amount: 45 mg 60 tablet 0    senna (SENOKOT) 8 6 MG tablet Take 1 tablet by mouth      sulfamethoxazole-trimethoprim (BACTRIM DS) 800-160 mg per tablet Take 1 tablet by mouth every 12 (twelve) hours for 7 days 14 tablet 0    ARIPiprazole (ABILIFY) 2 mg tablet Take 1 tablet (2 mg total) by mouth daily 30 tablet 0    Ferrous Sulfate Dried (Feosol) 200 (65 Fe) MG TABS Take 65 mg by mouth daily (Patient not taking: No sig reported) 90 tablet 0    lidocaine (XYLOCAINE) 2 % topical gel Apply topically as needed for mild pain (Patient not taking: No sig reported) 30 mL 0    metoprolol succinate (TOPROL-XL) 100 mg 24 hr tablet Take 1 tablet (100 mg total) by mouth daily for 7 days 90 tablet 0    naloxone (NARCAN) 4 mg/0 1 mL nasal spray Administer 1 spray into a nostril  If no response after 2-3 minutes, give another dose in the other nostril using a new spray  (Patient not taking: No sig reported) 1 each 1    nitrofurantoin (MACROBID) 100 mg capsule Take 1 capsule (100 mg total) by mouth 2 (two) times a day (Patient not taking: No sig reported) 10 capsule 0    phenazopyridine (PYRIDIUM) 200 mg tablet Take 1 tablet (200 mg total) by mouth 3 (three) times a day with meals (Patient not taking: No sig reported) 10 tablet 0    sodium chloride, PF, 0 9 % 10 mL by Intracatheter route daily Intracatheter flushing daily 900 mL 0    sodium chloride, PF, 0 9 % 10 mL by Intracatheter route daily Intracatheter flushing daily 900 mL 0    sodium chloride, PF, 0 9 % 10 mL by Intracatheter route daily Bilateral PCNs to be flushed daily with prefilled 10cc NS (Patient not taking: No sig reported) 600 mL 3     No current facility-administered medications for this visit  (Not in a hospital admission)      Objective:     24 Hour Vitals Assessment:     Vitals:    07/15/22 0822   BP: 128/74   Pulse: 65   Resp: 17   Temp: (!) 96 6 °F (35 9 °C)   SpO2: 100%       PHYSICIAN EXAM:    General: Appearance: alert, cooperative, in visible distress from pain in his urethra  HEENT: Normocephalic, atraumatic   No scleral icterus  conjunctivae clear  EOMI  Chest: No tenderness to palpation  No open wound noted  Lungs: Clear to auscultation bilaterally, Respirations unlabored  Cardiac: Regular rate and rhythm, +S1and S2  Abdomen: Soft, non-tender, non-distended  Bowel sounds are normal    Extremities:  No edema, cyanosis, clubbing  Skin: Skin color, turgor are normal    Lymphatics: no palpable supra-cervical, axillary, or inguinal adenopathy  Neurologic: Awake, Alert, and oriented, no gross focal deficits noted b/l  Port c/d/i      DATA REVIEW:    Pathology Result:    Final Diagnosis   Date Value Ref Range Status   04/12/2022   Final    A  Urinary Bladder, selected bladder biopsies:  - Focally intact urothelial mucosa with associated acutely inflamed granulation tissue  See comment  B  Prostatic urethra:  - Focally intact urothelial mucosa with associated acutely inflamed granulation tissue  See comment  Comment: Immunohistochemistry for AE1/3 is negative for an infiltrative pattern  Clinical history notable for status post chemotherapy and urine culture positive for Serratia marcescens  The findings are compatible with infectious cystitis  10/18/2021   Final    A  Left posterior wall, bladder (transurethral resection):     - Urothelial tissue with small atypical cauterized focus favored to represent superficially invasive high-grade urothelial carcinoma  - Muscularis propria (detrusor muscle) present, and negative for carcinoma  - Marked edema and mucosal denudation with thermal artifact noted  B  Anterior wall, bladder (transurethral resection):      - Invasive high-grade urothelial carcinoma  - Carcinoma invades muscularis propria (detrusor muscle)  C   Left lateral wall, bladder (transurethral resection):     - Urothelial tissue with small atypical focus with marked thermal artifact; cannot exclude superficial carcinoma          - Muscularis propria (detrusor muscle) present, and negative for carcinoma  - Marked edema and mucosal denudation with thermal artifact noted  Comment: This is an appended report  These results have been appended to a previously preliminary verified report  10/18/2021   Final    A  Renal Washing, RIGHT RENAL PELVIS WASHING:  Suspicious for high grade urothelial carcinoma [facility]) - see comment  Comment:  The above diagnostic category is from the recently published book, [employer] [place] [employer] Urinary Cytology, and is in keeping with the ongoing effort for utilization of standardized diagnostic terminology in urine cytology  *    *[employer] [place] [employer] Urinary Cytology  Renetta Grullon; 2016  B  Renal Washing, LEFT RENAL PELVIS WASHING:  Atypical urothelial cells (AUC) - see comment  Comment:  The above diagnostic category is from the recently published book, [employer] [place] [employer] Urinary Cytology, and is in keeping with the ongoing effort for utilization of standardized diagnostic terminology in urine cytology  *    *[employer] [place] [employer] Urinary Cytology  Renetta Grullon; 2016 09/03/2021   Final    A  Urine, Voided, :  Atypical urothelial cells (AUC) - see comment  Red blood cells     Satisfactory for evaluation  Comment:    - Few atypical urothelial cells noted in this patient with a 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  A high grade urothelial carcinoma cannot be excluded on this material  Suggest clinical correlation and follow-up as indicated  - The above diagnostic category is from the recently published book, [employer] [place] [employer] Urinary Cytology, and is in keeping with the ongoing effort for utilization of standardized diagnostic terminology in urine cytology  *    *[employer] [place] [employer] Urinary Cytology  Renetta Bone [place]; 2016 '          12/31/2020   Final    A  Left 5th metatarsal bone:  - Acute osteomyelitis in benign metatarsal bone  12/21/2020   Final    A  Bone, left 5th metatarsal, amputation:       - Acute osteomyelitis  - No dysplasia or malignancy is identified  B  Bone, 5th metatarsal clean margin, excision:       - Focal acute osteomyelitis  - Large caliber blood vessels with luminal calcifications and greater than 75% occlusion        - No dysplasia or malignancy is identified  Interpretation performed at [place] [id number]       11/19/2020   Final    A  Urinary Bladder, Left Posterior Bladder Wall:  -Extensively- denuded urothelial lined mucosa with mild chronic inflammation, vascular congestion  And edema   -Unremarkable small fragment of detrusor muscle present  -No evidence of invasive urothelial carcinoma seen    B  Urinary Bladder, Left lateral bladder Wall:  -Partially-denuded urothelial lined mucosa with mild  chronic inflammation  -Unremarkable small fragment of detrusor muscle present  -No evidence of invasive urothelial carcinoma seen    C  Urinary Bladder, Right Posterior Bladder wall:  -Urothelial lined mucosa with mild  chronic inflammation Von Brunn nests and mild urothelial atypia, possibly due to previous BCG administration   -Unremarkable small fragment of detrusor muscle present  -No evidence of invasive urothelial carcinoma seen    D  Urinary Bladder, Right Lateral Bladder Wall:  - Urothelial lined mucosa with mild  chronic inflammation   -Muscularis propria/ detrusor muscle is not present for evaluation    -No evidence of invasive urothelial carcinoma seen  E  Prostate, Prostate Tisssue:  -Urothelial lined mucosa with mild chronic inflammation, prominent Von Brunn nests and Cystitis cystica   -Unremarkable small fragment of detrusor muscle present   -No evidence of malignancy seen             08/17/2020   Final    A  Urine, Other, :  Negative for high grade urothelial carcinoma ([address]) - see comment  Urothelial cells, squamous cells, red blood cells and neutrophils  Satisfactory for evaluation  Comment:  The above diagnostic category is from the recently published book, [employer] [place] [employer] Urinary Cytology, and is in keeping with the ongoing effort for utilization of standardized diagnostic terminology in urine cytology  *    *[employer] [place] [employer] Urinary Cytology  Renetta Mccoyching; 2016 08/16/2016   Final    A   Bladder, biopsy:  -  High-grade urothelial carcinoma with flat and focal papillary architecture (see note)  07/18/2016   Final    A  Urine, clean catch (ThinPrep): Atypical urothelial cells (AUC) - see comment  Atypical single urothelial cells, benign squamous cells, red blood cells and neutrophils  Background of degenerative changes noted  Satisfactory for evaluation  Comment:  The above diagnostic category is from the recently published book, [employer] [place] [employer] Urinary Cytology, and is in keeping with the ongoing effort for utilization of standardized diagnostic terminology in urine cytology  *    *[employer] 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  Few red blood cells also identified  Satisfactory for evaluation  Image Results:   Image result are reviewed and documented in Hematology/Oncology history    CT outside images  1 2 [id number] [id number]      LABS:  Lab data are reviewed and documented in HemOnc history         Lab Results   Component Value Date    HGB 9 2 (L) 07/01/2022    HCT 28 7 (L) 07/01/2022    MCV 95 07/01/2022     07/01/2022    WBC 7 05 07/01/2022    NRBC 0 07/01/2022    BANDSPCT 3 02/04/2022    ATYLMPCT 1 (H) 02/04/2022     Lab Results   Component Value Date     09/03/2015    K 4 7 07/01/2022     07/01/2022    CO2 28 07/01/2022    ANIONGAP 5 09/03/2015    BUN 45 (H) 07/01/2022    CREATININE 3 07 (H) 07/01/2022    GLUCOSE 203 (H) 09/03/2015    GLUF 128 (H) 07/01/2022    CALCIUM 8 7 07/01/2022    CORRECTEDCA 9 4 07/01/2022    AST 42 07/01/2022    ALT 50 07/01/2022    ALKPHOS 95 07/01/2022    PROT 6 4 07/26/2015    BILITOT 0 50 07/26/2015    EGFR 18 07/01/2022       Lab Results   Component Value Date    IRON 186 (H) 12/10/2021    TIBC 377 12/10/2021    FERRITIN 23 12/10/2021    FERRITIN 40 10/13/2021    FERRITIN 31 06/22/2021    FERRITIN 23 02/17/2021    FERRITIN 52 12/19/2020    FERRITIN 73 09/19/2018       No results found for: [id number]    No results for input(s): WBC, CREAT in the last 72 hours      Invalid input(s):  PLT    By:  Lor Peterson MD, 7/15/2022, 8:50 AM

## 2022-07-06 NOTE — TELEPHONE ENCOUNTER
Called granddaughter, Bro Sheriff, discussed pt's current immunotherapy plan due to POD on scans  Discussed previous scans, rationale for them  Discussed that I don't think anything was missed on previous scans, necessarily  Decision for liquid biopsy over tissue biopsy discussed as well      Chase Guaman MD

## 2022-07-08 ENCOUNTER — TELEPHONE (OUTPATIENT)
Dept: SURGICAL ONCOLOGY | Facility: CLINIC | Age: 77
End: 2022-07-08

## 2022-07-08 ENCOUNTER — TELEPHONE (OUTPATIENT)
Dept: INTERNAL MEDICINE CLINIC | Facility: CLINIC | Age: 77
End: 2022-07-08

## 2022-07-08 DIAGNOSIS — M54.16 LUMBAR RADICULOPATHY: ICD-10-CM

## 2022-07-08 DIAGNOSIS — Z79.4 TYPE 2 DIABETES MELLITUS WITH STAGE 3 CHRONIC KIDNEY DISEASE, WITH LONG-TERM CURRENT USE OF INSULIN, UNSPECIFIED WHETHER STAGE 3A OR 3B CKD (HCC): ICD-10-CM

## 2022-07-08 DIAGNOSIS — E11.22 TYPE 2 DIABETES MELLITUS WITH STAGE 3 CHRONIC KIDNEY DISEASE, WITH LONG-TERM CURRENT USE OF INSULIN, UNSPECIFIED WHETHER STAGE 3A OR 3B CKD (HCC): ICD-10-CM

## 2022-07-08 DIAGNOSIS — C67.9 BLADDER CARCINOMA (HCC): ICD-10-CM

## 2022-07-08 DIAGNOSIS — M51.36 DEGENERATION OF LUMBAR INTERVERTEBRAL DISC: ICD-10-CM

## 2022-07-08 DIAGNOSIS — M47.816 LUMBAR SPONDYLOSIS: ICD-10-CM

## 2022-07-08 DIAGNOSIS — G89.3 CANCER RELATED PAIN: ICD-10-CM

## 2022-07-08 DIAGNOSIS — N18.30 TYPE 2 DIABETES MELLITUS WITH STAGE 3 CHRONIC KIDNEY DISEASE, WITH LONG-TERM CURRENT USE OF INSULIN, UNSPECIFIED WHETHER STAGE 3A OR 3B CKD (HCC): ICD-10-CM

## 2022-07-08 RX ORDER — DULOXETIN HYDROCHLORIDE 30 MG/1
30 CAPSULE, DELAYED RELEASE ORAL DAILY
Qty: 90 CAPSULE | Refills: 0 | Status: SHIPPED | OUTPATIENT
Start: 2022-07-08 | End: 2022-08-19

## 2022-07-08 RX ORDER — DOCUSATE SODIUM 100 MG/1
100 CAPSULE, LIQUID FILLED ORAL 2 TIMES DAILY
Qty: 100 CAPSULE | Refills: 0 | Status: SHIPPED | OUTPATIENT
Start: 2022-07-08 | End: 2022-08-19

## 2022-07-08 RX ORDER — GABAPENTIN 300 MG/1
300 CAPSULE ORAL
Qty: 90 CAPSULE | Refills: 0 | Status: SHIPPED | OUTPATIENT
Start: 2022-07-08 | End: 2022-10-10

## 2022-07-08 NOTE — TELEPHONE ENCOUNTER
----- Message from Leigh Helms MD sent at 6/25/2022  8:58 PM EDT -----  Please call the patient regarding his abnormal result   Send  UA  &  C&S  nephrology  &  urology

## 2022-07-12 ENCOUNTER — DOCUMENTATION (OUTPATIENT)
Dept: CARDIAC SURGERY | Facility: CLINIC | Age: 77
End: 2022-07-12

## 2022-07-12 ENCOUNTER — TELEPHONE (OUTPATIENT)
Dept: OTHER | Facility: OTHER | Age: 77
End: 2022-07-12

## 2022-07-12 ENCOUNTER — OFFICE VISIT (OUTPATIENT)
Dept: CARDIAC SURGERY | Facility: CLINIC | Age: 77
End: 2022-07-12
Payer: MEDICARE

## 2022-07-12 VITALS
OXYGEN SATURATION: 97 % | HEART RATE: 71 BPM | WEIGHT: 233 LBS | SYSTOLIC BLOOD PRESSURE: 122 MMHG | BODY MASS INDEX: 31.56 KG/M2 | TEMPERATURE: 97.5 F | HEIGHT: 72 IN | RESPIRATION RATE: 17 BRPM | DIASTOLIC BLOOD PRESSURE: 80 MMHG

## 2022-07-12 DIAGNOSIS — R91.8 PULMONARY NODULES: ICD-10-CM

## 2022-07-12 DIAGNOSIS — C67.9 BLADDER CARCINOMA (HCC): Primary | ICD-10-CM

## 2022-07-12 PROCEDURE — 99205 OFFICE O/P NEW HI 60 MIN: CPT | Performed by: THORACIC SURGERY (CARDIOTHORACIC VASCULAR SURGERY)

## 2022-07-12 NOTE — ASSESSMENT & PLAN NOTE
Mr  Yoel Orlando presents for evaluation of new pulmonary nodules in the setting of recurrent bladder cancer  Recent Pet-CT demonstrates increase in size of bilateral pulmonary nodules from a scan one month prior  These have some FDG uptake in addition to a left anterior chest wall lesion and retroperitoneal lymph nodes  This chest wall lesion is causing him discomfort and we can consider palliative RT to this if systemic therapy does not help  Dr Jenni Medina reviewed this with Dr Janae Mena and a liquid blood biopsy is possible for molecular markers to specify treatment rather than surgical biopsy  All of his sites of disease cannot be surgically removed  At this time, he will continue his care with Dr Janae Mena, and we will be available in the future if needed  He will discuss his dysuria and hematuria with Dr Marta Rowan

## 2022-07-12 NOTE — TELEPHONE ENCOUNTER
Patient called stating that he wants to talk to his urologist about his condition; says he is "tired of hearing about his condition from every other doctor except his urologist "  I asked him who is urologist is and he said, "never mind" and hung up

## 2022-07-12 NOTE — PROGRESS NOTES
I met with yoanna at his appointment with with Yanira Kim today  I introduced myself and explained my role to him  Questions answered, support provided

## 2022-07-13 ENCOUNTER — APPOINTMENT (OUTPATIENT)
Dept: LAB | Facility: CLINIC | Age: 77
End: 2022-07-13
Payer: MEDICARE

## 2022-07-13 LAB

## 2022-07-13 PROCEDURE — 81001 URINALYSIS AUTO W/SCOPE: CPT | Performed by: INTERNAL MEDICINE

## 2022-07-13 NOTE — TELEPHONE ENCOUNTER
Pt of Dr Marta Rowan hx of bladder ca experiencing dark red hematuria with clots with burning and pain  Went to lab today for UA  No UC ordered  Pt has neph tube which is draining clear yellow  Pt had NM PET scan on 6/28/22 ordered by Dr Jordan [place] Sherif and he is extremely concerned over results  Scheduled with Dr Kerline Ramires tomorrow for poss cysto

## 2022-07-14 ENCOUNTER — PROCEDURE VISIT (OUTPATIENT)
Dept: UROLOGY | Facility: MEDICAL CENTER | Age: 77
End: 2022-07-14
Payer: MEDICARE

## 2022-07-14 VITALS
HEART RATE: 79 BPM | SYSTOLIC BLOOD PRESSURE: 130 MMHG | BODY MASS INDEX: 31.56 KG/M2 | HEIGHT: 72 IN | DIASTOLIC BLOOD PRESSURE: 80 MMHG | WEIGHT: 233 LBS

## 2022-07-14 DIAGNOSIS — R31.9 URINARY TRACT INFECTION WITH HEMATURIA, SITE UNSPECIFIED: ICD-10-CM

## 2022-07-14 DIAGNOSIS — C67.9 BLADDER CARCINOMA (HCC): Primary | ICD-10-CM

## 2022-07-14 DIAGNOSIS — N39.0 URINARY TRACT INFECTION WITH HEMATURIA, SITE UNSPECIFIED: ICD-10-CM

## 2022-07-14 LAB
SL AMB  POCT GLUCOSE, UA: ABNORMAL
SL AMB LEUKOCYTE ESTERASE,UA: ABNORMAL
SL AMB POCT BILIRUBIN,UA: ABNORMAL
SL AMB POCT BLOOD,UA: ABNORMAL
SL AMB POCT CLARITY,UA: CLEAR
SL AMB POCT COLOR,UA: YELLOW
SL AMB POCT KETONES,UA: ABNORMAL
SL AMB POCT NITRITE,UA: POSITIVE
SL AMB POCT PH,UA: 5.5
SL AMB POCT SPECIFIC GRAVITY,UA: 1.01
SL AMB POCT URINE PROTEIN: ABNORMAL
SL AMB POCT UROBILINOGEN: 0.2

## 2022-07-14 PROCEDURE — 99214 OFFICE O/P EST MOD 30 MIN: CPT | Performed by: UROLOGY

## 2022-07-14 PROCEDURE — 87186 SC STD MICRODIL/AGAR DIL: CPT | Performed by: UROLOGY

## 2022-07-14 PROCEDURE — 81003 URINALYSIS AUTO W/O SCOPE: CPT | Performed by: UROLOGY

## 2022-07-14 PROCEDURE — 87077 CULTURE AEROBIC IDENTIFY: CPT | Performed by: UROLOGY

## 2022-07-14 PROCEDURE — 87086 URINE CULTURE/COLONY COUNT: CPT | Performed by: UROLOGY

## 2022-07-14 RX ORDER — SULFAMETHOXAZOLE AND TRIMETHOPRIM 800; 160 MG/1; MG/1
1 TABLET ORAL EVERY 12 HOURS SCHEDULED
Qty: 14 TABLET | Refills: 0 | Status: SHIPPED | OUTPATIENT
Start: 2022-07-14 | End: 2022-07-21

## 2022-07-14 NOTE — LETTER
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-received chemo radiation with intermittent gross hematuria with passage of clots  Currently urine clear    2  Bilateral ureteral obstruction with nephrostomy tubes -plan maintain external nephrostomy tubes with no plan for internalization    3  Dysuria/probable acute cystitis with hematuria-urine culture and sensitivity ordered  Empiric Bactrim DS 1 p o  B i d  Ordered    4  Gross hematuria-due to bladder cancer  Plan palliative care  No problem-specific Assessment & Plan notes found for this encounter  Diagnoses and all orders for this visit:    Bladder carcinoma ([facility] [address] )  -     POCT urine dip auto non-scope  -     Urine culture    Urinary tract infection with hematuria, site unspecified  -     Urine culture  -     sulfamethoxazole-trimethoprim (BACTRIM DS) 800-160 mg per tablet; Take 1 tablet by mouth every 12 (twelve) hours for 7 days          Subjective:      Patient ID: Lacy Mendiola is a 76 y o  male  HPI  47-year-old male with history of high-grade muscle invasive bladder cancer and pulmonary nodules consistent with metastatic disease metabolically active on PET scanning presents because of dysuria and gross hematuria with clots last night now voiding clear urine with occasional smaller clots    The following portions of the patient's history were reviewed and updated as appropriate: allergies, current medications, past family history, past medical history, past social history, past surgical history and problem list     Review of Systems   Gastrointestinal: Positive for abdominal pain  Genitourinary: Positive for dysuria, hematuria, penile pain and urgency  Musculoskeletal: Positive for myalgias  All other systems reviewed and are negative  Objective:      /80   Pulse 79   Ht 6' (1 829 m)   Wt 106 kg (233 lb)   BMI 31 60 kg/m²          Physical Exam  Vitals reviewed  Constitutional:       General: He is not in acute distress  Appearance: Normal appearance  He is not ill-appearing, toxic-appearing or diaphoretic  HENT:      Head: Normocephalic and atraumatic  Nose: Nose normal       Mouth/Throat:      Mouth: Mucous membranes are moist    Eyes:      Extraocular Movements: Extraocular movements intact  Pulmonary:      Effort: Pulmonary effort is normal  No respiratory distress  Abdominal:      General: Bowel sounds are normal  There is no distension  Palpations: Abdomen is soft  Neurological:      Mental Status: He is alert and oriented to person, place, and time  Psychiatric:         Mood and Affect: Mood normal          Behavior: Behavior normal          Thought Content:  Thought content normal          Judgment: Judgment normal

## 2022-07-14 NOTE — PROGRESS NOTES
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 [address] )  -     POCT urine dip auto non-scope  -     Urine culture    Urinary tract infection with hematuria, site unspecified  -     Urine culture  -     sulfamethoxazole-trimethoprim (BACTRIM DS) 800-160 mg per tablet; Take 1 tablet by mouth every 12 (twelve) hours for 7 days          Subjective:      Patient ID: Alvarez Diallo is a 76 y o  male  HPI  64-year-old male with history of high-grade muscle invasive bladder cancer and pulmonary nodules consistent with metastatic disease metabolically active on PET scanning presents because of dysuria and gross hematuria with clots last night now voiding clear urine with occasional smaller clots  The following portions of the patient's history were reviewed and updated as appropriate: allergies, current medications, past family history, past medical history, past social history, past surgical history and problem list     Review of Systems   Gastrointestinal: Positive for abdominal pain  Genitourinary: Positive for dysuria, hematuria, penile pain and urgency  Musculoskeletal: Positive for myalgias  All other systems reviewed and are negative  Objective:      /80   Pulse 79   Ht 6' (1 829 m)   Wt 106 kg (233 lb)   BMI 31 60 kg/m²          Physical Exam  Vitals reviewed     Constitutional: General: He is not in acute distress  Appearance: Normal appearance  He is not ill-appearing, toxic-appearing or diaphoretic  HENT:      Head: Normocephalic and atraumatic  Nose: Nose normal       Mouth/Throat:      Mouth: Mucous membranes are moist    Eyes:      Extraocular Movements: Extraocular movements intact  Pulmonary:      Effort: Pulmonary effort is normal  No respiratory distress  Abdominal:      General: Bowel sounds are normal  There is no distension  Palpations: Abdomen is soft  Neurological:      Mental Status: He is alert and oriented to person, place, and time  Psychiatric:         Mood and Affect: Mood normal          Behavior: Behavior normal          Thought Content:  Thought content normal          Judgment: Judgment normal

## 2022-07-15 ENCOUNTER — HOSPITAL ENCOUNTER (OUTPATIENT)
Dept: INFUSION CENTER | Facility: HOSPITAL | Age: 77
Discharge: HOME | End: 2022-07-15
Attending: INTERNAL MEDICINE
Payer: MEDICARE

## 2022-07-15 ENCOUNTER — TELEPHONE (OUTPATIENT)
Dept: HEMATOLOGY ONCOLOGY | Facility: CLINIC | Age: 77
End: 2022-07-15

## 2022-07-15 ENCOUNTER — OFFICE VISIT (OUTPATIENT)
Dept: HEMATOLOGY ONCOLOGY | Facility: CLINIC | Age: 77
End: 2022-07-15
Payer: MEDICARE

## 2022-07-15 VITALS
RESPIRATION RATE: 17 BRPM | TEMPERATURE: 96.6 F | HEIGHT: 72 IN | WEIGHT: 236 LBS | HEART RATE: 65 BPM | BODY MASS INDEX: 31.97 KG/M2 | DIASTOLIC BLOOD PRESSURE: 74 MMHG | SYSTOLIC BLOOD PRESSURE: 128 MMHG | OXYGEN SATURATION: 100 %

## 2022-07-15 VITALS — WEIGHT: 236 LBS | BODY MASS INDEX: 31.97 KG/M2 | HEIGHT: 72 IN

## 2022-07-15 DIAGNOSIS — C67.3 MALIGNANT NEOPLASM OF ANTERIOR WALL OF URINARY BLADDER (HCC): Primary | ICD-10-CM

## 2022-07-15 LAB
ALBUMIN SERPL BCP-MCNC: 4 G/DL (ref 3–5.2)
ALP SERPL-CCNC: 108 U/L (ref 43–122)
ALT SERPL W P-5'-P-CCNC: 52 U/L
ANION GAP SERPL CALCULATED.3IONS-SCNC: 11 MMOL/L (ref 5–14)
AST SERPL W P-5'-P-CCNC: 46 U/L (ref 17–59)
BASOPHILS # BLD AUTO: 0.03 THOUSANDS/ΜL (ref 0–0.1)
BASOPHILS NFR BLD AUTO: 0 % (ref 0–1)
BILIRUB SERPL-MCNC: 0.41 MG/DL
BUN SERPL-MCNC: 44 MG/DL (ref 5–25)
CALCIUM SERPL-MCNC: 8.5 MG/DL (ref 8.4–10.2)
CHLORIDE SERPL-SCNC: 106 MMOL/L (ref 97–108)
CO2 SERPL-SCNC: 21 MMOL/L (ref 22–30)
CREAT SERPL-MCNC: 3.23 MG/DL (ref 0.7–1.5)
EOSINOPHIL # BLD AUTO: 0.24 THOUSAND/ΜL (ref 0–0.61)
EOSINOPHIL NFR BLD AUTO: 4 % (ref 0–6)
ERYTHROCYTE [DISTWIDTH] IN BLOOD BY AUTOMATED COUNT: 13.9 % (ref 11.6–15.1)
GFR SERPL CREATININE-BSD FRML MDRD: 17 ML/MIN/1.73SQ M
GLUCOSE SERPL-MCNC: 111 MG/DL (ref 70–99)
HCT VFR BLD AUTO: 29.5 % (ref 36.5–49.3)
HGB BLD-MCNC: 9.6 G/DL (ref 12–17)
IMM GRANULOCYTES # BLD AUTO: 0.04 THOUSAND/UL (ref 0–0.2)
IMM GRANULOCYTES NFR BLD AUTO: 1 % (ref 0–2)
LYMPHOCYTES # BLD AUTO: 1.37 THOUSANDS/ΜL (ref 0.6–4.47)
LYMPHOCYTES NFR BLD AUTO: 20 % (ref 14–44)
MCH RBC QN AUTO: 30.4 PG (ref 26.8–34.3)
MCHC RBC AUTO-ENTMCNC: 32.5 G/DL (ref 31.4–37.4)
MCV RBC AUTO: 93 FL (ref 82–98)
MONOCYTES # BLD AUTO: 0.68 THOUSAND/ΜL (ref 0.17–1.22)
MONOCYTES NFR BLD AUTO: 10 % (ref 4–12)
NEUTROPHILS # BLD AUTO: 4.43 THOUSANDS/ΜL (ref 1.85–7.62)
NEUTS SEG NFR BLD AUTO: 65 % (ref 43–75)
NRBC BLD AUTO-RTO: 0 /100 WBCS
PLATELET # BLD AUTO: 185 THOUSANDS/UL (ref 149–390)
PMV BLD AUTO: 9.8 FL (ref 8.9–12.7)
POTASSIUM SERPL-SCNC: 4.6 MMOL/L (ref 3.6–5)
PROT SERPL-MCNC: 7.6 G/DL (ref 5.9–8.4)
RBC # BLD AUTO: 3.16 MILLION/UL (ref 3.88–5.62)
SODIUM SERPL-SCNC: 138 MMOL/L (ref 137–147)
TSH SERPL DL<=0.05 MIU/L-ACNC: 2.59 UIU/ML (ref 0.45–4.5)
WBC # BLD AUTO: 6.79 THOUSAND/UL (ref 4.31–10.16)

## 2022-07-15 PROCEDURE — 85025 COMPLETE CBC W/AUTO DIFF WBC: CPT | Performed by: INTERNAL MEDICINE

## 2022-07-15 PROCEDURE — 84443 ASSAY THYROID STIM HORMONE: CPT | Performed by: INTERNAL MEDICINE

## 2022-07-15 PROCEDURE — 96413 CHEMO IV INFUSION 1 HR: CPT

## 2022-07-15 PROCEDURE — 99214 OFFICE O/P EST MOD 30 MIN: CPT | Performed by: INTERNAL MEDICINE

## 2022-07-15 PROCEDURE — 80053 COMPREHEN METABOLIC PANEL: CPT | Performed by: INTERNAL MEDICINE

## 2022-07-15 RX ORDER — SODIUM CHLORIDE 9 MG/ML
20 INJECTION, SOLUTION INTRAVENOUS ONCE
Status: COMPLETED | OUTPATIENT
Start: 2022-07-15 | End: 2022-07-15

## 2022-07-15 RX ADMIN — SODIUM CHLORIDE 20 ML/HR: 0.9 INJECTION, SOLUTION INTRAVENOUS at 10:35

## 2022-07-15 RX ADMIN — SODIUM CHLORIDE 400 MG: 9 INJECTION, SOLUTION INTRAVENOUS at 11:07

## 2022-07-15 NOTE — PROGRESS NOTES
Patient tolerated [place] today without complications   Patient states he will be starting bactrim for UTI today, central labs drawn today, ok to proceed without results, AVS provided with next appts and reminder to get labs before next chemo

## 2022-07-15 NOTE — TELEPHONE ENCOUNTER
Good Morning! Please schedule the rest of the infusion requests  Patient prefers Fridays around 10am  Thank you!

## 2022-07-15 NOTE — PROGRESS NOTES
Received notificaion in regards to patient last having labs done on 7/1  Reviewed with Dr Mari Kim  Recommend patient have labs drawn today and proceed without results  Labs ordered and infusion notified  Encouraged recommendation of labs obtained 3-5 days prior to next infusion appointment  Infusion RN notified

## 2022-07-15 NOTE — TELEPHONE ENCOUNTER
Left voice message for patient notifying him that his infusions have been scheduled out to the end of the year  Gave Teams # for any questions

## 2022-07-17 LAB
BACTERIA UR CULT: ABNORMAL

## 2022-07-25 ENCOUNTER — TELEPHONE (OUTPATIENT)
Dept: HEMATOLOGY ONCOLOGY | Facility: CLINIC | Age: 77
End: 2022-07-25

## 2022-07-25 NOTE — TELEPHONE ENCOUNTER
I cannot find results either  Libby Turcios or ALEXANDER, can you see if these are back yet? Thanks!

## 2022-07-25 NOTE — TELEPHONE ENCOUNTER
We do not have access to Jaquelin's [employer] results via the portal   Jeremias ([employer] rep) is getting access for this  These results will be faxed to [facility] [facility] in the mean time  Please be on the look out for them since neither ALEXANDER or I will be in [place] tomorrow  Thanks

## 2022-07-25 NOTE — TELEPHONE ENCOUNTER
Patient calling in requesting test results results for his recent labs completed on 7/15  CALL RETURN FORM   Reason for patient call? Patient calling in requesting test results results for his liquid biopsy  Patient's primary oncologist?  Dr Maxwell Morris    Name of person the patient was calling for? Helen Medley    Any additional information to add, if applicable? Patient's best call back number is [phone]    Informed patient that the message will be forwarded to the team and someone will get back to them as soon as possible    Did you relay this information to the patient?   Yes

## 2022-07-26 NOTE — TELEPHONE ENCOUNTER
Call out to patient and reviewed that we do not have the results yet for the testing and will reach out once we do have those results present and reviewed by Dr Adriana Diaz  Patient denied any further questions and concerns at this time  Reviewed that we just received Elie Lance results and will be uploading into chart and notifying Dr Adriana Diaz

## 2022-07-27 ENCOUNTER — TELEPHONE (OUTPATIENT)
Dept: HEMATOLOGY ONCOLOGY | Facility: CLINIC | Age: 77
End: 2022-07-27

## 2022-07-27 NOTE — TELEPHONE ENCOUNTER
CALL RETURN FORM   Reason for patient call? Patient is calling in requesting his biopsy results    Patient's primary oncologist? Teja Villar     Name of person the patient was calling for?   Whitney Vargas    Any additional information to add, if applicable? n/a   Informed patient that the message will be forwarded to the team and someone will get back to them as soon as possible    Did you relay this information to the patient?  yes, patient can be reached back at [phone]

## 2022-07-28 ENCOUNTER — HOSPITAL ENCOUNTER (OUTPATIENT)
Dept: RADIOLOGY | Facility: HOSPITAL | Age: 77
Discharge: HOME | End: 2022-07-28
Attending: RADIOLOGY
Payer: MEDICARE

## 2022-07-28 DIAGNOSIS — C67.9 BLADDER CANCER (HCC): ICD-10-CM

## 2022-07-28 PROCEDURE — 50435 EXCHANGE NEPHROSTOMY CATH: CPT | Performed by: RADIOLOGY

## 2022-07-28 PROCEDURE — C1769 GUIDE WIRE: HCPCS

## 2022-07-28 PROCEDURE — 50435 EXCHANGE NEPHROSTOMY CATH: CPT

## 2022-07-28 PROCEDURE — C1729 CATH, DRAINAGE: HCPCS

## 2022-07-28 RX ORDER — IODIXANOL 320 MG/ML
15 INJECTION, SOLUTION INTRAVASCULAR
Status: COMPLETED | OUTPATIENT
Start: 2022-07-28 | End: 2022-07-28

## 2022-07-28 RX ADMIN — IODIXANOL 12 ML: 320 INJECTION, SOLUTION INTRAVASCULAR at 10:45

## 2022-07-28 NOTE — SEDATION DOCUMENTATION
Upon arriving to the dept and laying on the table, pt reported left arm pain from an injury sustained prior to his appt for his tube changes, A comfortable position was obtained with his arms at his side  Pt verbalized he was in a comfortable position and procedure was continued  Pt had a BL PCN change in IR with Dr Kayleen Alejo without complication  Pt tolerated well  Gauze and tape to insertion site as well as STAT lock that is accompanied with the new tube  BL tubes secured  Pt has no questions or concerns at this time  Pt discharged home

## 2022-07-28 NOTE — DISCHARGE INSTRUCTIONS
Uterine Fibroids and Uterine Artery Embolization    WHAT YOU SHOULD KNOW:   Uterine fibroids are growths found inside your uterus (womb)  Uterine fibroids also may be called tumors (lumps) or leiomyomas  Uterine fibroids often appear in groups, or you may have only one  They can be small or large, and they can grow in size  They are almost always benign (not cancer) and likely will not spread to other parts of your body  AFTER YOU LEAVE:     Medicines:    Pain medicine: You may be given medicine to take away or decrease pain  Do not wait until the pain is severe before you take your medicine  Take Toradol on schedule until finished, then switch to ibuprofen if desired  Do not take tylenol with Percoset  Take your medicine as directed:  Call your caregiver if you think your medicine is not helping or if you have side effects    To avoid narcotic related constipation: Take an over the counter stool softener, such as Colace, 100 mg twice a day, or whatever you typically prefer  Drink plenty of fluids  Resume your normal diet  Small sips of flat soda will help with nausea  Follow up with your GYN as directed  Avoid heavy lifting: You will need to avoid lifting heavy objects for 3 days  This helps prevent injury to your puncture in the groin  May return to normal activity as tolerated  Care for your wound: May remove dressing and shower, leave steri-strips in place for a few days  Just pat dry  Also: Drink lots of fluids and try to move around as much as possible to avoid blood clots forming in your legs  Contact Interventional Radiology at [phone] [place] PATIENTS: Contact Interventional Radiology at [phone]) [place] PATIENTS: Contact Interventional Radiology at [phone]) if:  You have a foul smelling discharge from the vagina  If you have fever over 101, with or without chills  Persistent nausea or vomiting  You have any questions about your condition or care    You have increased vaginal bleeding, pelvic pain, or pelvic pressure  These symptoms will occur, but should get better over 3-5 days, not worse  Seek immediate care or call 911 if:   Your heart begins to race, and you feel faint  Your leg feels warm, tender, and painful  It may look swollen and red  You feel lightheaded, short of breath, and have chest pain  Uterine Fibroids and Uterine Artery Embolization    WHAT YOU SHOULD KNOW:   Uterine fibroids are growths found inside your uterus (womb)  Uterine fibroids also may be called tumors (lumps) or leiomyomas  Uterine fibroids often appear in groups, or you may have only one  They can be small or large, and they can grow in size  They are almost always benign (not cancer) and likely will not spread to other parts of your body  AFTER YOU LEAVE:     Medicines:    Pain medicine: You may be given medicine to take away or decrease pain  Do not wait until the pain is severe before you take your medicine  Take Toradol on schedule until finished, then switch to ibuprofen if desired  Do not take tylenol with Percoset  Take your medicine as directed:  Call your caregiver if you think your medicine is not helping or if you have side effects    To avoid narcotic related constipation: Take an over the counter stool softener, such as Colace, 100 mg twice a day, or whatever you typically prefer  Drink plenty of fluids  Resume your normal diet  Small sips of flat soda will help with nausea  Follow up with your GYN as directed  Avoid heavy lifting: You will need to avoid lifting heavy objects for 3 days  This helps prevent injury to your puncture in the groin  May return to normal activity as tolerated  Care for your wound: May remove dressing and shower, leave steri-strips in place for a few days  Just pat dry  Also: Drink lots of fluids and try to move around as much as possible to avoid blood clots forming in your legs      Contact Interventional Radiology at [phone] [place] PATIENTS: Contact Interventional Radiology at [phone]) [place]arp[place] Lomeli PATIENTS: Contact Interventional Radiology at [phone]) if:  You have a foul smelling discharge from the vagina  If you have fever over 101, with or without chills  Persistent nausea or vomiting  You have any questions about your condition or care  You have increased vaginal bleeding, pelvic pain, or pelvic pressure  These symptoms will occur, but should get better over 3-5 days, not worse  Seek immediate care or call 911 if:   Your heart begins to race, and you feel faint  Your leg feels warm, tender, and painful  It may look swollen and red  You feel lightheaded, short of breath, and have chest pain

## 2022-07-29 ENCOUNTER — TELEPHONE (OUTPATIENT)
Dept: HEMATOLOGY ONCOLOGY | Facility: CLINIC | Age: 77
End: 2022-07-29

## 2022-07-29 NOTE — TELEPHONE ENCOUNTER
Reached out to patient grand-daughter Rocky Leslie and she is asking for Dr Jordan [place] Sherif to review Gil Kumar results with her   She had stated that patient memory is "Not the best "     [facility] [phone]

## 2022-07-29 NOTE — TELEPHONE ENCOUNTER
CALL RETURN FORM   Reason for patient call? Hoang Nowak patients granddaughter calling in requesting to speak with Dr Hiram Wisdom Nurse regarding patients lab results from 7/1/22  Patient's primary oncologist? Dr Trimble Findkylie    Name of person the patient was calling for? Nurse   Any additional information to add, if applicable? Best call back number  [phone]   Informed patient that the message will be forwarded to the team and someone will get back to them as soon as possible    Did you relay this information to the patient?  Yes

## 2022-08-15 ENCOUNTER — NURSE TRIAGE (OUTPATIENT)
Dept: OTHER | Facility: OTHER | Age: 77
End: 2022-08-15

## 2022-08-15 ENCOUNTER — APPOINTMENT (OUTPATIENT)
Dept: LAB | Facility: HOSPITAL | Age: 77
End: 2022-08-15
Payer: MEDICARE

## 2022-08-15 DIAGNOSIS — C67.3 MALIGNANT NEOPLASM OF ANTERIOR WALL OF URINARY BLADDER (HCC): ICD-10-CM

## 2022-08-15 DIAGNOSIS — R39.89 SUSPECTED UTI: Primary | ICD-10-CM

## 2022-08-15 DIAGNOSIS — R39.89 SUSPECTED UTI: ICD-10-CM

## 2022-08-15 LAB
ALBUMIN SERPL BCP-MCNC: 2.9 G/DL (ref 3.5–5)
ALP SERPL-CCNC: 127 U/L (ref 46–116)
ALT SERPL W P-5'-P-CCNC: 57 U/L (ref 12–78)
ANION GAP SERPL CALCULATED.3IONS-SCNC: 8 MMOL/L (ref 4–13)
AST SERPL W P-5'-P-CCNC: 39 U/L (ref 5–45)
BACTERIA UR QL AUTO: ABNORMAL /HPF
BASOPHILS # BLD AUTO: 0.05 THOUSANDS/ΜL (ref 0–0.1)
BASOPHILS NFR BLD AUTO: 1 % (ref 0–1)
BILIRUB SERPL-MCNC: 0.22 MG/DL (ref 0.2–1)
BILIRUB UR QL STRIP: NEGATIVE
BUN SERPL-MCNC: 60 MG/DL (ref 5–25)
CALCIUM ALBUM COR SERPL-MCNC: 9.9 MG/DL (ref 8.3–10.1)
CALCIUM SERPL-MCNC: 9 MG/DL (ref 8.3–10.1)
CHLORIDE SERPL-SCNC: 102 MMOL/L (ref 96–108)
CLARITY UR: ABNORMAL
CO2 SERPL-SCNC: 27 MMOL/L (ref 21–32)
COLOR UR: YELLOW
CREAT SERPL-MCNC: 3.48 MG/DL (ref 0.6–1.3)
EOSINOPHIL # BLD AUTO: 0.43 THOUSAND/ΜL (ref 0–0.61)
EOSINOPHIL NFR BLD AUTO: 5 % (ref 0–6)
ERYTHROCYTE [DISTWIDTH] IN BLOOD BY AUTOMATED COUNT: 13.3 % (ref 11.6–15.1)
GFR SERPL CREATININE-BSD FRML MDRD: 15 ML/MIN/1.73SQ M
GLUCOSE SERPL-MCNC: 171 MG/DL (ref 65–140)
GLUCOSE UR STRIP-MCNC: NEGATIVE MG/DL
HCT VFR BLD AUTO: 32.9 % (ref 36.5–49.3)
HGB BLD-MCNC: 10.3 G/DL (ref 12–17)
HGB UR QL STRIP.AUTO: ABNORMAL
IMM GRANULOCYTES # BLD AUTO: 0.07 THOUSAND/UL (ref 0–0.2)
IMM GRANULOCYTES NFR BLD AUTO: 1 % (ref 0–2)
KETONES UR STRIP-MCNC: NEGATIVE MG/DL
LEUKOCYTE ESTERASE UR QL STRIP: ABNORMAL
LYMPHOCYTES # BLD AUTO: 1.8 THOUSANDS/ΜL (ref 0.6–4.47)
LYMPHOCYTES NFR BLD AUTO: 21 % (ref 14–44)
MCH RBC QN AUTO: 29.9 PG (ref 26.8–34.3)
MCHC RBC AUTO-ENTMCNC: 31.3 G/DL (ref 31.4–37.4)
MCV RBC AUTO: 95 FL (ref 82–98)
MONOCYTES # BLD AUTO: 0.93 THOUSAND/ΜL (ref 0.17–1.22)
MONOCYTES NFR BLD AUTO: 11 % (ref 4–12)
NEUTROPHILS # BLD AUTO: 5.44 THOUSANDS/ΜL (ref 1.85–7.62)
NEUTS SEG NFR BLD AUTO: 61 % (ref 43–75)
NITRITE UR QL STRIP: NEGATIVE
NON-SQ EPI CELLS URNS QL MICRO: ABNORMAL /HPF
NRBC BLD AUTO-RTO: 0 /100 WBCS
PH UR STRIP.AUTO: 6 [PH]
PLATELET # BLD AUTO: 256 THOUSANDS/UL (ref 149–390)
PMV BLD AUTO: 10.3 FL (ref 8.9–12.7)
POTASSIUM SERPL-SCNC: 5.2 MMOL/L (ref 3.5–5.3)
PROT SERPL-MCNC: 8.1 G/DL (ref 6.4–8.4)
PROT UR STRIP-MCNC: ABNORMAL MG/DL
RBC # BLD AUTO: 3.45 MILLION/UL (ref 3.88–5.62)
RBC #/AREA URNS AUTO: ABNORMAL /HPF
SODIUM SERPL-SCNC: 137 MMOL/L (ref 135–147)
SP GR UR STRIP.AUTO: 1.02 (ref 1–1.03)
TSH SERPL DL<=0.05 MIU/L-ACNC: 2.16 UIU/ML (ref 0.45–4.5)
UROBILINOGEN UR QL STRIP.AUTO: 0.2 E.U./DL
WBC # BLD AUTO: 8.72 THOUSAND/UL (ref 4.31–10.16)
WBC #/AREA URNS AUTO: ABNORMAL /HPF

## 2022-08-15 PROCEDURE — 80053 COMPREHEN METABOLIC PANEL: CPT

## 2022-08-15 PROCEDURE — 36415 COLL VENOUS BLD VENIPUNCTURE: CPT

## 2022-08-15 PROCEDURE — 85025 COMPLETE CBC W/AUTO DIFF WBC: CPT

## 2022-08-15 PROCEDURE — 81001 URINALYSIS AUTO W/SCOPE: CPT

## 2022-08-15 PROCEDURE — 87086 URINE CULTURE/COLONY COUNT: CPT

## 2022-08-15 PROCEDURE — 84443 ASSAY THYROID STIM HORMONE: CPT

## 2022-08-15 NOTE — TELEPHONE ENCOUNTER
Returned call to patient   Seen in our [facility] location for  Bladder Carcinoma   Last visit with provider was on 7/14/22 B/l nephrostomy tube is reported as darker in color  Patient reports blood from penis when he has to urinate  Patient state he is voiding via urethra   Reports no fevers, + chills, + nausea , no vomiting  Decreased appittie   Reports small clots in urethra only   Reports frequency, urgency , and burning Patient is taking infusion therapy, next appt is 8/26/22 Hem/Onc    Pharmacy confirmed as :  [id number]

## 2022-08-15 NOTE — TELEPHONE ENCOUNTER
Reason for Disposition   SEVERE pain (e g , excruciating)    Answer Assessment - Initial Assessment Questions  1  LOCATION and RADIATION: "Where is the pain located?"       Left testicle    2  QUALITY: "What does the pain feel like?"  (e g , sharp, dull, aching, burning)      Sharp, especially when urinating    3  SEVERITY: "How bad is the pain?"  (Scale 1-10; or mild, moderate, severe)    - MILD (1-3): doesn't interfere with normal activities     - MODERATE (4-7): interferes with normal activities (e g , work or school) or awakens from sleep    - SEVERE (8-10): excruciating pain, unable to do any normal activities, difficulty walking      8/10, severe    4  ONSET: "When did the pain start?"      "Last week"    5  PATTERN: "Does it come and go, or has it been constant since it started?"      Constant    6  SCROTAL APPEARANCE: "What does the scrotum look like?" "Is there any swelling or redness?"       Slightly swollen on left side    7  HERNIA: "Has a doctor ever told you that you have a hernia?"      Denies     8   OTHER SYMPTOMS: "Do you have any other symptoms?" (e g , fever, abdominal pain, vomiting, difficulty passing urine)      Nausea, decreased appetite, urinary frequency, burning with urination, hematuria    Protocols used: SCROTAL PAIN-ADULT-OH

## 2022-08-15 NOTE — TELEPHONE ENCOUNTER
Regarding: Testicular pain-blood in urine  ----- Message from Deanna Hood sent at 8/15/2022 11:53 AM EDT -----  "I am prone to UTIs and am having urinary frequency, burning with urination, testicle pain and blood in my urine "

## 2022-08-15 NOTE — TELEPHONE ENCOUNTER
Is patient able to successfully empty bladder with urination? If so, we will await urine testing    If patient is having increasing difficulty with urinating and requires Shirley catheter insertion please bring him into the office for insertion of a large [facility] Shirley catheter

## 2022-08-15 NOTE — TELEPHONE ENCOUNTER
Returned call advised per providers recommendations  Patient is not having any difficulty with emptying at this time  Patient will go get testing  Hydrate and monitor  Ed precautions reviewed for after office hours   Will contact our office with any issue with emptying       Will monitor for urine testing results

## 2022-08-17 LAB — BACTERIA UR CULT: NORMAL

## 2022-08-17 NOTE — TELEPHONE ENCOUNTER
Returned call to patient   Patient states that his nephrostomy tubes draining without difficulty  When he is having an issue he will flush without difficulty  No fevers or chills  + slight nausea, no vomiting  Advised patient that his UC is not finalized will contact patient with resutls  He should continue to monitor temp, drainage, fever , chills and pain and contact our office with any worsening of symptoms    Patient verbalized understanding and agreement

## 2022-08-18 DIAGNOSIS — C67.9 BLADDER CARCINOMA (HCC): ICD-10-CM

## 2022-08-18 DIAGNOSIS — E78.2 MIXED HYPERLIPIDEMIA: ICD-10-CM

## 2022-08-18 DIAGNOSIS — M51.36 DEGENERATION OF LUMBAR INTERVERTEBRAL DISC: ICD-10-CM

## 2022-08-18 DIAGNOSIS — M47.816 LUMBAR SPONDYLOSIS: ICD-10-CM

## 2022-08-18 DIAGNOSIS — G89.3 CANCER RELATED PAIN: ICD-10-CM

## 2022-08-18 DIAGNOSIS — R09.89 LEFT CAROTID BRUIT: ICD-10-CM

## 2022-08-18 DIAGNOSIS — M54.16 LUMBAR RADICULOPATHY: ICD-10-CM

## 2022-08-18 DIAGNOSIS — N13.30 BILATERAL HYDRONEPHROSIS: ICD-10-CM

## 2022-08-18 NOTE — TELEPHONE ENCOUNTER
In view of his diagnosis and advanced disease I am not concerned about occasional strings of clots in his urine but the pain is testicle requires examination to make sure he does not have epididymitis which would require antibiotic therapy even with a negative urine culture

## 2022-08-18 NOTE — TELEPHONE ENCOUNTER
Urine culture  Order: [id number]   Status: Final result     Visible to patient: Yes (not seen)     Next appt: 08/26/2022 at 08:20 AM in Hematology and Oncology (Lynn Padgett MD)     Dx:  Suspected UTI    Specimen Information: Urine, Clean Catch         0 Result Notes    Urine Culture >100,000 cfu/ml     Mixed Contaminants X3              Specimen Collected: 08/15/22  4:10 PM Last Resulted: 08/17/22  5:44 PM

## 2022-08-18 NOTE — TELEPHONE ENCOUNTER
Please inform patient results of final urine culture were negative for infection, only mixed contaminants noted

## 2022-08-18 NOTE — TELEPHONE ENCOUNTER
Spoke to pt and advised per Dr Yudelka Jimenez that clots are expected with his bladder ca and it does not spread to the testicle  He could possibly have epididymitis and he can prescribe an antibiotic if pt feels the discomfort is not manageable  Pt stated he is taking tylenol and will contact us if discomfort worsens

## 2022-08-18 NOTE — PROGRESS NOTES
Hematology/Oncology Outpatient Office Note    Date of Service: 2022    [facility] HEMATOLOGY ONCOLOGY SPECIALISTS [facility]  [phone]    Reason for Consultation:   Chief Complaint   Patient presents with    Follow-up       Cancer Stage at diagnosis: II, interval progression to Stage IV    Referral Physician: No ref  provider found    Primary Care Physician:  Katie Johnson MD     Nickname: Ishmael Dykes    Spouse: Cher Montesinos    Granddaughter: Ja Tyler     Original ECO    Today's ECO    Goals and Barriers:  Current Goal: Minimize effects of disease burden, extend life  Barriers to accomplishing this: fatigue, depression, anxiety, worry, L foot claudication, lower back pain from spinal stenosis and uses a cane a lot of the time    Patient's Capacity to Self Care:  Patient is able to self care    Code Status: Full code    Advanced directives: not on file but I recommended he get that done    ASSESSMENT & PLAN      Diagnosis ICD-10-CM Associated Orders   1  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)  C67 3 CT chest abdomen pelvis wo contrast   2  Rib pain on left side  R07 81 NM bone scan whole body     C-reactive protein   3  Hyperkalemia  E87 5 Renal function panel       This is a 76 y o  c PMHx notable for spinal stenosis, Gastric bypass (), 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, diabetic nephropathy, and renal vascular disease, DM, being seen in consultation for bladder cancer       From an overall performance status basis, I believe Vanita Gaston can tolerate systemic treatment due to an ECOG PS of 2  A shared decision making approach was employed during today's visit       Thoughts on approach to systemic therapy in the first line and subsequent lines of therapy:    My favored first line is cisplatin/ gemcitabine however the patient's overall performance status and kidney function is not amenable to this     Thoughts on prediction for Grade III-V toxicity in elderly patients to systemic chemotherapy:  Age >72 years   GI/ cancers  Falls in last 6 months   Hearing impairment (b/l hearing aids)  Limited ability to walk 1 block  Requires assistance with medications  Decreased social activities   Torsten Martinez O 2011)  BMI<18 5 or moderate or severe weight loss or decrease in food intake in past 3 months  Mild vs moderate dementia/depression     The above 5 factors predict toxicity for the patient if he undergoes systemic chemotherapy and I discussed this with the patient  For unresectable cases: ICI can be employed if there is POD after definitive CRT    Discussion of disease response monitoring: We discussed with the patient at length the role of imaging and potential blood monitoring of burden of disease  We will opt to get CT chest, abdomen, pelvis without contrast due to kidney function as surveillance following conclusion of therapy  As there has been progression of disease, our plan is to employ liquid biopsy to assess actionable biomarkers and determine updated bio-marker status  1/27/22 pt only got 1/2 of Days 1-5 5-FU due to contents spilling  Pt completed concurrent 5-FU/MTC + RT and found to have POD on 6/28/2022 PET/CT (lung mets) and is now on immunotherapy  Discussion of decision making   Oncology history updated, accordingly, during this visit   Goals of care/patient communication  o I discussed with the patient the clinical course leading up to their cancer diagnosis  I reviewed relevant office notes, imaging reports and pathology result as well   o I told the patient that this is a case of potentially curable disease and what this means  We discussed that the goal of anti-cancer therapy is to provide best quality of life, extend overall survival, and progression free survival as shown in clinical trials   We also discussed that there might be a point when the cancer will no longer respond to this anti-neoplastic therapy    o I explained the risks/benefits of the proposed cancer therapy: Pako Wayne and after discussion including understanding risks of possible life-threatening complications and therapy-related malignancy development, informed consent for treatment has been signed   TNM/Staging At Diagnosis  Cancer Staging  Malignant neoplasm of anterior wall of urinary bladder ([facility] [address] )  Staging form: Urinary Bladder, AJCC 8th Edition  - Clinical stage from 10/18/2021: Stage II (cT2, cN0, cM0) - Signed by Shira Gallego MD on 11/17/2021  Stage prefix: Initial diagnosis   Disease Features/Tumor Markers/Genetics  o Tumor Marker: n/a  o Notable Path Features: anterior wall Invasive high-grade urothelial carcinoma  Carcinoma invades muscularis propria (detrusor muscle)   Guardant 360 showing CDK4 amplification, TP53 mutation   Treatment:  Devonona Jesusitar Other Supportive care:    Treatment Team Members  o Surgeon: Dr Vasile moreno Rad Onc: Dr Elida Etienne Palliative: April Festus Craft  o Nephrology: Dr Fozia Mondragon: creat 2 81 (eGFR 24), normocytic anemia, Hgb 9 8   Diagnostics: 11/11/21 CT CAP w/o c: 1  No metastatic disease within the limitations of noncontrast technique in the chest abdomen or pelvis  2   Diffuse hemicircumferential smooth bladder wall thickening on the left, similar to the prior study when allowing for differences in bladder distention  Prostatomegaly  6/3/2022 CT CAP w/o c: Development of a few pulmonary nodules, the largest measuring 8 mm  A PET CT would be recommended  Worsening wall thickening of the urinary bladder which may represent post treatment changes  Cannot exclude cystitis or progression of cancer  6/28/2022 PET/CT: read pending, per my assessment lung metastatic hypermetabolic nodules as well as L chest wall nodule    Port is without acute issues      Discussion of decision making    I personally communicated with Dr Sania Sue on this case and have reviewed the following lab results, the image studies, pathology, other specialty/physicians consult notes and recommendations, and outside medical records from [facility]  I had a lengthy discussion with the patient and shared the work-up findings  We discussed the diagnosis and management plan as below  I spent 33 reviewing the records (labs, clinician notes, outside records, medical history, ordering medicine/tests/procedures, interpreting the imaging/labs previously done) and coordination of care as well as direct time with the patient today, of which greater than 50% of the time was spent in counseling and coordination of care with the patient/family  · Plan/Labs  · Continue to assess for signs of distress as he has anxiety/depression  F/u Palliative for cancer associated pain  · F/u Urology for care and management of neph tubes  · Infusion chairs scheduled for Keytruda q6 weeks up until 8/26/2022 until further POD  Labs to be obtained prior to each cycle (TSH, CMP, CBC)  C2 coming up today  · NM Bone Scan  · Repeat RFP Monday  · Pt got assessment by Thoracic surgery and is deemed unresectable   · Restaging CT CAP in 2 months scheduled 10/3/2022 and will cancel this and get one today due to concern for POD based on dyspnea and L rib pain      Follow Up:  1 week    All questions were answered to the patient's satisfaction during this encounter  The patient knows the contact information for our office and knows to reach out for any relevant concerns related to this encounter  They are to call for any temperature 100 4 or higher, new symptoms including but not restricted to shaking chills, decreased appetite, nausea, vomiting, diarrhea, increased fatigue, shortness of breath or chest pain, confusion, and not feeling the strength to come to the clinic  For all other listed problems and medical diagnosis in their chart - they are managed by PCP and/or other specialists, which the patient acknowledges   Thank you very much for your consultation and making us a part of this patient's care  We are continuing to follow closely with you  Please do not hesitate to reach out to me with any additional questions or concerns  Carol Lr MD  Hematology & Medical Oncology Staff Physician             Disclaimer: This document was prepared using [employer] Direct technology  If a word or phrase is confusing, or does not make sense, this is likely due to recognition error which was not discovered during this clinician's review  If you believe an error has occurred, please contact me through [address] [place] line for godfrey? 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)    Initial Diagnosis    Malignant neoplasm of anterior wall of urinary bladder ([facility] [address] )     1994 Surgery    TURBT      1994 - 1995 Chemotherapy    Induction intravesical BCG x 2      8/17/2016 Surgery    TURBT      12/13/2016 Surgery    TURBT  [facility])    Bladder, left posterior wall, biopsy: High-grade urothelial carcinoma in-situ  Muscularis propria is identified with no tumor seen  Bladder, trigone, biopsy: Non-invasive high-grade papillary urothelial carcinoma, and flat urothelial carcinoma in-situ  Muscularis propria is not identified  1/4/2017 - 2/8/2017 Chemotherapy    Induction intravesical BCG     6/19/2017 Surgery    TURBT  [facility])    - Posterior wall, biopsy: Mild chronic cystitis with focal urothelial atypia  Muscularis propria is identified      - Right lateral wall, biopsy: Granulomatous cystitis  Muscularis propria is identified  - Left lateral wall, biopsy: Non invasive high-grade urothelial carcinoma  Muscularis propria is not identified         4/26/2019 Surgery    TURBT   [facility])     - bladder, right posterior wall, biopsy: Urothelial carcinoma in situ   - bladder, right lateral wall, biopsy: Small focus of urothelial carcinoma in situ  - bladder, left posterior wall, biopsy: Urothelial carcinoma in situ   - bladder, left lateral wall, biopsy: Urothelial carcinoma in situ     - bladder, trigone, biopsy: Invasive high-grade urothelial carcinoma, invading into lamina propria  No lymphovascular invasion seen  Muscularis propria not present  Separate piece showing urothelial carcinoma in situ        7/2019 -  Chemotherapy    Induction intravesical BCG x 4      11/4/2019 Surgery    TURBT  [facility])    - Superficial bladder tumor, transurethral resection: Non-invasive high grade urothelial carcinoma, with urothelial carcinoma in situ  Muscularis propria is not identified      - Bladder tumor, transurethral resection: Non-invasive high grade urothelial carcinoma, with urothelial carcinoma in situ, see note  Muscularis propria is present and free of tumor  12/9/2019 - 1/28/2020 Chemotherapy    Induction intravesical BCG+INF        6/9/2020 - 6/23/2020 Chemotherapy    Maintenance intravesical BCG+INF        11/19/2020 Biopsy    TURBT (Yaritza Muniz, Dr Elias Mccormack)    A  Urinary Bladder, Left Posterior Bladder Wall:  -Extensively- denuded urothelial lined mucosa with mild chronic inflammation, vascular congestion  And edema   -Unremarkable small fragment of detrusor muscle present  -No evidence of invasive urothelial carcinoma seen     B  Urinary Bladder, Left lateral bladder Wall:  -Partially-denuded urothelial lined mucosa with mild  chronic inflammation  -Unremarkable small fragment of detrusor muscle present  -No evidence of invasive urothelial carcinoma seen     C  Urinary Bladder, Right Posterior Bladder wall:  -Urothelial lined mucosa with mild  chronic inflammation Von Brunn nests and mild urothelial atypia, possibly due to previous BCG administration   -Unremarkable small fragment of detrusor muscle present  -No evidence of invasive urothelial carcinoma seen     D   Urinary Bladder, Right Lateral Bladder Wall:  - Urothelial lined mucosa with mild chronic inflammation   -Muscularis propria/ detrusor muscle is not present for evaluation    -No evidence of invasive urothelial carcinoma seen  E  Prostate, Prostate Tissue:  -Urothelial lined mucosa with mild chronic inflammation, prominent Von Brunn nests and Cystitis cystica   -Unremarkable small fragment of detrusor muscle present   -No evidence of malignancy seen  10/18/2021 -  Cancer Staged    Staging form: Urinary Bladder, AJCC 8th Edition  - Clinical stage from 10/18/2021: Stage II (cT2, cN0, cM0) - Signed by Germain Angle MD on 11/17/2021  Stage prefix: Initial diagnosis       10/18/2021 Surgery    TURBT ([facility])    A  Left posterior wall, bladder (transurethral resection):     - Urothelial tissue with small atypical cauterized focus favored to represent superficially invasive high-grade urothelial carcinoma  - Muscularis propria (detrusor muscle) present, and negative for carcinoma  - Marked edema and mucosal denudation with thermal artifact noted  B  Anterior wall, bladder (transurethral resection):      - Invasive high-grade urothelial carcinoma  - Carcinoma invades muscularis propria (detrusor muscle)  C   Left lateral wall, bladder (transurethral resection):     - Urothelial tissue with small atypical focus with marked thermal artifact; cannot exclude superficial carcinoma  - Muscularis propria (detrusor muscle) present, and negative for carcinoma        - Marked edema and mucosal denudation with thermal artifact noted     1/24/2022 - 3/26/2022 Chemotherapy    mitoMYcin (MUTAMYCIN), 12 mg/m2 = 28 7 mg (100 % of original dose 12 mg/m2), Intravenous, Once, 1 of 1 cycle  Dose modification: 12 mg/m2 (original dose 12 mg/m2, Cycle 1), 3 mg/m2 (original dose 12 mg/m2, Cycle 1)  Administration: 7 2 mg (1/24/2022)  fluorouracil (ADRUCIL) ambulatory infusion Soln, 500 mg/m2/day = 4,780 mg (50 % of original dose 1,000 mg/m2/day), Intravenous, Over 96 hours, 1 of 1 cycle, Start date: 1/18/2022, End date: 1/23/2022  Dose modification: 500 mg/m2/day (original dose 1,000 mg/m2/day, Cycle 1, Reason: Dose modified as per discussion with consulting physician)     1/24/2022 - 3/24/2022 Radiation    Pelvis:1 6X 21 / 21 275 0 5,775 59      Treatment dates:  C1: 1/24/2022 - 3/24/2022     7/15/2022 -  Chemotherapy    pembrolizumab (KEYTRUDA) IVPB, 400 mg, Intravenous, Once, 1 of 6 cycles  Administration: 400 mg (7/15/2022)       He had been getting BCG vaccine for his bladder since 1994 6/28/2022 PET/CT: read pending, per my assessment lung metastatic hypermetabolic nodules as well as L chest wall nodule    SUBJECTIVE  (INTERVAL HISTORY)      Clotting History None   Bleeding History No major events   Cancer History Bladder   Family Cancer History None   H/O Blood/Plt Transfusion None   Tobacco/etoh/drug abuse 1 5 PPD x 17 years (quit 1970), no other abuse   Hx COVID19 Infection and Vaccine Status Pfizer x 3 (had booster 9/2021)   Cancer Screening history n/a   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)   New medications in the last month: PO iron daily (recommended MWF)  Pain: dysuria (since 10/2021 bladder procedure), LBP, L foot pain s/p remote toe amputation     His maximum weight prior to the gastric bypass that he had was 330lbs  I have reviewed the relevant past medical, surgical, social and family history  I have also reviewed allergies and medications for this patient  Interval events:   Having L sided chest pain worsening in the past week with worsening dyspnea as well  Stable energy level, appetite, weight  Otherwise, doing well  Denies falls, gen weakness  Review of Systems  Denies unintentional weight loss, F/C, N/V, CP, diarrhea, constipation, rash, itching, melena, hematuria, hematochezia      Chronic mild SOB with exertion, intermittent anxiety and depression that has been worsening since his cancer diagnosis  He has had fatigue since 2/2021 (his discharge from the hospital for LLE toe amputation)  No new issues although he has been dealing with his chronic back pain over the past year  A 10-point review of system was performed, pertinent positive and negative were detailed as above  Otherwise, the 10-point review of system was negative  Past Medical History:   Diagnosis Date    Anemia     Last assessed: 9/28/17    Anxiety     Arteriosclerotic cardiovascular disease     Last assessed: 9/28/17    Arthritis     Bladder cancer ([facility] [address] )     bladder- had BCG treatments    Chronic kidney disease     Stage IV    CKD (chronic kidney disease) stage 4, GFR 15-29 ml/min ([facility])     Colon polyp     Coronary artery disease     7 stents    Depression     Diabetes mellitus ([facility])     IDDM    GERD (gastroesophageal reflux disease)     Glaucoma     Hematuria     History of fusion of cervical spine     Hyperlipidemia     Hypertension     Insomnia     Last assessed: 11/14/12    Loss of hearing     has hearing aids but usually does not wear them    Other seasonal allergic rhinitis     Last assessed: 2/10/16    PAD (peripheral artery disease) ([facility])     Shortness of breath     on exertion    Spinal stenosis of lumbar region     Transient cerebral ischemia     No Residual    Uses walker     w/c for longer distances       Past Surgical History:   Procedure Laterality Date    CARDIAC SURGERY      Cath stent placement  Last assessed: 3/9/17  Interventional Catheterization    CHOLECYSTECTOMY      COLONOSCOPY      CYSTOSCOPY      Diagnostic w/biopsy  Vincenzo Call  Last assessed: 12/1/14    CYSTOSCOPY N/A 4/12/2022    Procedure: CYSTOSCOPY  Bladder biopsies  ;  Surgeon: Joe Haynes MD;  [facility];  Service: Urology    CYSTOSCOPY W/ RETROGRADES Right 3/1/2022    Procedure: CYSTO; stent removal retrograde;  Surgeon: Joe Haynes MD;  [facility];  Service: Urology    CYSTOSCOPY W/ URETERAL STENT PLACEMENT Bilateral 10/18/2021    Procedure: bilateral retrogrades, cytology collection;  Surgeon: Flaca Sargent MD;  [facility];  Service: Urology    CYSTOURETHROSCOPY      w/cautery  Feliz Chapman    FL RETROGRADE PYELOGRAM  10/18/2021    FL RETROGRADE PYELOGRAM  10/24/2021    GASTRIC BYPASS      For morbid obesity w/Shaji-en-Y   Resolved: 11/17/09    INCISION AND DRAINAGE OF WOUND Right 2/26/2017    Procedure: INCISION AND DRAINAGE (I&D) EXTREMITY WITH APPLICATION OF GRAFT JACKET;  Surgeon: Kimmy Jaimes DPM;  [facility];  Service:     INCISION AND DRAINAGE OF WOUND Right 4/25/2017    Procedure: INCISION AND DRAINAGE (I&D) EXTREMITY, APPLICATION OF GRAFT;  Surgeon: Kimmy Jaimes DPM;  [facility];  Service:     IR BIOPSY OTHER  7/2/2020    IR LOWER EXTREMITY ANGIOGRAM  2/8/2021    IR LOWER EXTREMITY ANGIOGRAM  2/11/2021    IR NEPHROSTOMY TUBE CHECK/CHANGE/REPOSITION/REINSERTION/UPSIZE  4/28/2022    IR NEPHROSTOMY TUBE CHECK/CHANGE/REPOSITION/REINSERTION/UPSIZE  5/24/2022    IR NEPHROSTOMY TUBE CHECK/CHANGE/REPOSITION/REINSERTION/UPSIZE  6/7/2022    IR NEPHROSTOMY TUBE CHECK/CHANGE/REPOSITION/REINSERTION/UPSIZE  7/28/2022    IR NEPHROSTOMY TUBE PLACEMENT  2/25/2022    IR PORT PLACEMENT  1/17/2022    IR TUNNELED CENTRAL LINE PLACEMENT  12/24/2020    JOINT REPLACEMENT      christofer knees replaced    OR AMPUTATION METATARSAL+TOE,SINGLE Left 12/21/2020    Procedure: RAY RESECTION FOOT;  Surgeon: Milagro Powell DPM;  [facility];  Service: Podiatry    OR AMPUTATION METATARSAL+TOE,SINGLE Left 12/31/2020    Procedure: 5TH MET RESECTION;  Surgeon: Milagro Powell DPM;  [facility];  Service: Podiatry    OR CYSTOURETHROSCOPY W/IRRIG & EVAC CLOTS N/A 2/10/2021    Procedure: CYSTOSCOPY EVACUATION OF CLOTS, fulguration;  Surgeon: La Dyer MD;  [facility];  Service: Urology    OR CYSTOURETHROSCOPY W/IRRIG & EVAC CLOTS N/A 10/24/2021    Procedure: CYSTOSCOPY EVACUATION OF CLOT, fulguration of bleeding vessels, right ureter stent placement, retrograde pyelogram;  Surgeon: Lanette Medina MD;  [facility];  Service: Urology    MS CYSTOURETHROSCOPY,BIOPSY N/A 8/16/2016    Procedure: CYSTOSCOPY WITH BIOPSIES;  Surgeon: Lisa Ding MD;  [facility];  Service: Urology    MS CYSTOURETHROSCOPY,FULGUR <0 5 CM LESN N/A 11/19/2020    Procedure: CYSTO W/BIOPSIES, transurethral prostate bx;  Surgeon: Shamar Milner MD;  [facility];  Service: Urology    MS CYSTOURETHROSCOPY,FULGUR >5 CM LESN Bilateral 10/18/2021    Procedure: TRANSURETHRAL RESECTION OF BLADDER TUMOR (TURBT);   Surgeon: Juan Carlos Huffman MD;  [facility];  Service: Urology    MS Deonte Oldham 3RD+ ORD [address] PEL/LXTR [facility] Left 2/8/2021    Procedure: LEG angiogram, CO2 w/limited contrast with balloon angioplasty postertior tibial artery;  Surgeon: Swati Dawson MD;  [facility];  Service: Vascular    ROTATOR CUFF REPAIR      SMALL INTESTINE SURGERY      Surgery Shaji-en-Y    SPINAL FUSION      lumbar and cervical fusions    VAC DRESSING APPLICATION Right 9/88/8337    Procedure: APPLICATION VAC DRESSING;  Surgeon: Ambrocio Oviedo DPM;  [facility];  Service:    [address] [place] [address] Left 2/16/2021    Procedure: FOOT DEBRIDE, [address] OUT w/graft application;  Surgeon: Ambrocio Oviedo DPM;  [facility];  Service: Podiatry       Family History   Problem Relation Age of Onset    Diabetes Mother     Heart disease Mother     Other Mother         High blood pressure    Heart disease Father     Diabetes Sister     Other Sister         High blood pressure    Kidney disease Sister     Heart disease Brother     Other Brother         High blood pressure       Social History     Socioeconomic History    Marital status: /Civil Union     Spouse name: Not on file    Number of children: Not on file    Years of education: Not on file    Highest education level: Not on file   Occupational History    Not on file   Tobacco Use    Smoking status: Former Smoker     Packs/day: 3 00     Years: 27 00     Pack years: 81 00     Types: Cigarettes     Quit date: 1970     Years since quittin 6    Smokeless tobacco: Never Used   Vaping Use    Vaping Use: Never used   Substance and Sexual Activity    Alcohol use: Never     Comment: beer / liquor    Drug use: Not Currently     Types: Marijuana     Comment: quit 2019 had medical marijuana    Sexual activity: Not Currently   Other Topics Concern    Not on file   Social History Narrative    Consumes 1 cup of coffee and 1 soda per day     Social Determinants of Health     Financial Resource Strain: Not on file   Food Insecurity: Not on file   Transportation Needs: Not on file   Physical Activity: Not on file   Stress: Not on file   Social Connections: Not on file   Intimate Partner Violence: Not on file   Housing Stability: Not on file       Allergies   Allergen Reactions    Atorvastatin Hives, Itching and Rash    Simvastatin Rash and Edema     Edema of lower legs    Statins Hives and Itching    Insulin Lispro Swelling and Edema     " Lower Legs"    Other Itching, Rash and Other (See Comments)     "EKG Patches"   "blue EKG patches"       Current Outpatient Medications   Medication Sig Dispense Refill    Accu-Chek Softclix Lancets lancets Test blood sugar 4 times daily 200 each 0    acetaminophen (TYLENOL) 325 mg tablet Take 650 mg by mouth every 6 (six) hours as needed for mild pain        ALPRAZolam (XANAX) 0 25 mg tablet At twice a day as needed for anxiety 30 tablet 0    aspirin (ECOTRIN LOW STRENGTH) 81 mg EC tablet Take 1 tablet (81 mg total) by mouth daily      Azelastine HCl 0 15 % SOLN Inhale 1 spray 2 (two) times a day 30 mL 3    Bimatoprost (LUMIGAN OP) Apply 1 drop to eye daily at bedtime       calcitriol (ROCALTROL) 0 25 mcg capsule Take 1 capsule (0 25 mcg total) by mouth daily 90 capsule 0    docusate sodium (COLACE) 100 mg capsule TAKE ONE CAPSULE BY MOUTH TWICE A  capsule 0    DULoxetine (CYMBALTA) 30 mg delayed release capsule TAKE ONE CAPSULE BY MOUTH EVERY DAY 90 capsule 0    ezetimibe (ZETIA) 10 mg tablet TAKE ONE TABLET BY MOUTH ONCE DAILY IN THE EARLY MORNING 90 tablet 0    Ferrous Sulfate Dried (Feosol) 200 (65 Fe) MG TABS Take 65 mg by mouth daily 90 tablet 0    fluocinonide (LIDEX) 0 05 % cream Apply topically 2 (two) times a day (Patient taking differently: Apply topically as needed) 30 g 0    fluticasone (FLONASE) 50 mcg/act nasal spray 1 spray into each nostril daily (Patient taking differently: 1 spray into each nostril as needed) 11 mL 1    furosemide (LASIX) 20 mg tablet Take 1 tablet (20 mg total) by mouth daily 90 tablet 1    gabapentin (Neurontin) 300 mg capsule Take 1 capsule (300 mg total) by mouth daily at bedtime 90 capsule 0    glucose blood (Accu-Chek Martha Plus) test strip Test blood sugar 4 times daily 200 strip 0    Insulin Pen Needle 31G X 8 MM MISC Inject 3 times a day 100 each 2    isosorbide mononitrate (IMDUR) 30 mg 24 hr tablet Take 1 tablet (30 mg total) by mouth daily in the early morning 90 tablet 0    Lantus SoloStar 100 units/mL injection pen 18 units qhs (Patient taking differently: Inject 18 Units under the skin daily at bedtime) 30 mL 1    lidocaine (XYLOCAINE) 2 % topical gel Apply topically as needed for mild pain 30 mL 0    lidocaine-prilocaine (EMLA) cream Apply topically as needed for mild pain 30 g 0    loperamide (IMODIUM) 2 mg capsule Take 2 mg by mouth 4 (four) times a day as needed for diarrhea      metoprolol succinate (TOPROL-XL) 100 mg 24 hr tablet TAKE ONE TABLET BY MOUTH ONCE DAILY 90 tablet 0    multivitamin (THERAGRAN) TABS Take 1 tablet by mouth daily   naloxone (NARCAN) 4 mg/0 1 mL nasal spray Administer 1 spray into a nostril   If no response after 2-3 minutes, give another dose in the other nostril using a new spray  1 each 1    nitrofurantoin (MACROBID) 100 mg capsule Take 1 capsule (100 mg total) by mouth 2 (two) times a day 10 capsule 0    NovoLOG FlexPen 100 units/mL injection pen 10 units with each meal  (Patient taking differently: Inject 10 Units under the skin 3 (three) times a day with meals) 5 pen 1    omeprazole (PriLOSEC) 20 mg delayed release capsule Take 20 mg by mouth daily in the early morning PRN       ondansetron (ZOFRAN-ODT) 4 mg disintegrating tablet Take 1 tablet (4 mg total) by mouth every 8 (eight) hours as needed for nausea or vomiting for up to 7 days 10 tablet 0    oxybutynin (DITROPAN-XL) 10 MG 24 hr tablet TAKE ONE TABLET BY MOUTH ONCE DAILY 30 tablet 0    oxyCODONE (OxyCONTIN) 10 mg 12 hr tablet Take 1 tablet (10 mg total) by mouth every 8 (eight) hours Max Daily Amount: 30 mg 90 tablet 0    oxyCODONE (Roxicodone) 5 immediate release tablet Take 1-1 5 tablets (5-7 5 mg total) by mouth every 4 (four) hours as needed for moderate pain (1 tab for moderate pain 1 5 tab for severe pain ) Max Daily Amount: 45 mg 60 tablet 0    phenazopyridine (PYRIDIUM) 200 mg tablet Take 1 tablet (200 mg total) by mouth 3 (three) times a day with meals 10 tablet 0    senna (SENOKOT) 8 6 MG tablet Take 1 tablet by mouth      sodium chloride, PF, 0 9 % 10 mL by Intracatheter route daily Bilateral PCNs to be flushed daily with prefilled 10cc  mL 3    ARIPiprazole (ABILIFY) 2 mg tablet Take 1 tablet (2 mg total) by mouth daily 30 tablet 0    sodium chloride, PF, 0 9 % 10 mL by Intracatheter route daily Intracatheter flushing daily 900 mL 0    sodium chloride, PF, 0 9 % 10 mL by Intracatheter route daily Intracatheter flushing daily 900 mL 0     No current facility-administered medications for this visit         (Not in a hospital admission)      Objective:     24 Hour Vitals Assessment:     Vitals:    08/26/22 0820   BP: 128/70   Pulse: 88 Resp: 16   Temp: 97 7 °F (36 5 °C)   SpO2: 96%       PHYSICIAN EXAM:    General: Appearance: alert, cooperative, in visible distress from pain in his urethra  HEENT: Normocephalic, atraumatic  No scleral icterus  conjunctivae clear  EOMI  Chest: No tenderness to palpation  No open wound noted  Lungs: L sided diminished compared to the R, Respirations unlabored  Cardiac: Regular rate and rhythm, +S1and S2  Abdomen: Soft, non-tender, non-distended  Bowel sounds are normal    Extremities:  No edema, cyanosis, clubbing  Skin: Skin color, turgor are normal    Lymphatics: no palpable supra-cervical, axillary, or inguinal adenopathy  Neurologic: Awake, Alert, and oriented, no gross focal deficits noted b/l  Port c/d/i      DATA REVIEW:    Pathology Result:    Final Diagnosis   Date Value Ref Range Status   04/12/2022   Final    A  Urinary Bladder, selected bladder biopsies:  - Focally intact urothelial mucosa with associated acutely inflamed granulation tissue  See comment  B  Prostatic urethra:  - Focally intact urothelial mucosa with associated acutely inflamed granulation tissue  See comment  Comment: Immunohistochemistry for AE1/3 is negative for an infiltrative pattern  Clinical history notable for status post chemotherapy and urine culture positive for Serratia marcescens  The findings are compatible with infectious cystitis  10/18/2021   Final    A  Left posterior wall, bladder (transurethral resection):     - Urothelial tissue with small atypical cauterized focus favored to represent superficially invasive high-grade urothelial carcinoma  - Muscularis propria (detrusor muscle) present, and negative for carcinoma  - Marked edema and mucosal denudation with thermal artifact noted  B  Anterior wall, bladder (transurethral resection):      - Invasive high-grade urothelial carcinoma  - Carcinoma invades muscularis propria (detrusor muscle)       C   Left lateral wall, bladder (transurethral resection):     - Urothelial tissue with small atypical focus with marked thermal artifact; cannot exclude superficial carcinoma  - Muscularis propria (detrusor muscle) present, and negative for carcinoma  - Marked edema and mucosal denudation with thermal artifact noted  Comment: This is an appended report  These results have been appended to a previously preliminary verified report  10/18/2021   Final    A  Renal Washing, RIGHT RENAL PELVIS WASHING:  Suspicious for high grade urothelial carcinoma [facility]) - see comment  Comment:  The above diagnostic category is from the recently published book, [facility] [place] [facility] Urinary Cytology, and is in keeping with the ongoing effort for utilization of standardized diagnostic terminology in urine cytology  *    *[facility] [place] [facility] Urinary Cytology  Renetta Lang; 2016  B  Renal Washing, LEFT RENAL PELVIS WASHING:  Atypical urothelial cells (AUC) - see comment  Comment:  The above diagnostic category is from the recently published book, [facility] [place] [facility] Urinary Cyt[facility], and is in keeping with the ongoing effort for utilization of standardized diagnostic terminology in urine cytology  *    *[facility] [place] [facility] Urinary Cytology  Renetta Walter Counts; 2016          09/03/2021   Final    A  Urine, Voided, :  Atypical urothelial cells (AUC) - see comment  Red blood cells     Satisfactory for evaluation  Comment:    - Few atypical urothelial cells noted in this patient with a 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  A high grade urothelial carcinoma cannot be excluded on this material  Suggest clinical correlation and follow-up as indicated     - The above diagnostic category is from the recently published book, [facility] [place] [facility] Urinary Cytology, and is in keeping with the ongoing effort for utilization of standardized diagnostic terminology in urine cytology  *    *[facility] Urinary Cytology  Renetta Oliveros; 2016 '          12/31/2020   Final    A  Left 5th metatarsal bone:  - Acute osteomyelitis in benign metatarsal bone  12/21/2020   Final    A  Bone, left 5th metatarsal, amputation:       - Acute osteomyelitis  - No dysplasia or malignancy is identified  B  Bone, 5th metatarsal clean margin, excision:       - Focal acute osteomyelitis  - Large caliber blood vessels with luminal calcifications and greater than 75% occlusion        - No dysplasia or malignancy is identified  Interpretation performed at [address] [address]       11/19/2020   Final    A  Urinary Bladder, Left Posterior Bladder Wall:  -Extensively- denuded urothelial lined mucosa with mild chronic inflammation, vascular congestion  And edema   -Unremarkable small fragment of detrusor muscle present  -No evidence of invasive urothelial carcinoma seen    B  Urinary Bladder, Left lateral bladder Wall:  -Partially-denuded urothelial lined mucosa with mild  chronic inflammation  -Unremarkable small fragment of detrusor muscle present  -No evidence of invasive urothelial carcinoma seen    C  Urinary Bladder, Right Posterior Bladder wall:  -Urothelial lined mucosa with mild  chronic inflammation Von Brunn nests and mild urothelial atypia, possibly due to previous BCG administration   -Unremarkable small fragment of detrusor muscle present  -No evidence of invasive urothelial carcinoma seen    D  Urinary Bladder, Right Lateral Bladder Wall:  - Urothelial lined mucosa with mild  chronic inflammation   -Muscularis propria/ detrusor muscle is not present for evaluation    -No evidence of invasive urothelial carcinoma seen        E  Prostate, Prostate Tisssue:  -Urothelial lined mucosa with mild chronic inflammation, prominent Von Brunn nests and Cystitis cystica   -Unremarkable small fragment of detrusor muscle present   -No evidence of malignancy seen  08/17/2020   Final    A  Urine, Other, :  Negative for high grade urothelial carcinoma ([address]) - see comment  Urothelial cells, squamous cells, red blood cells and neutrophils  Satisfactory for evaluation  Comment:  The above diagnostic category is from the recently published book, [facility] [place] [facility] Urinary Cytology, and is in keeping with the ongoing effort for utilization of standardized diagnostic terminology in urine cytology  *    *[facility] [employer] [facility]inary Cytology  Renetta L  Patmaggiewaldemar Goodwin Vladimir Keith; 2016             08/16/2016   Final    A   Bladder, biopsy:  -  High-grade urothelial carcinoma with flat and focal papillary architecture (see note)  07/18/2016   Final    A  Urine, clean catch (ThinPrep): Atypical urothelial cells (AUC) - see comment  Atypical single urothelial cells, benign squamous cells, red blood cells and neutrophils  Background of degenerative changes noted  Satisfactory for evaluation  Comment:  The above diagnostic category is from the recently published book, [facility] [employer] [facility] Reporting Urinary Cytology, and is in keeping with the ongoing effort for utilization of standardized diagnostic terminology in urine cytology  *    *[facility] [employer] [facility]inary Cyt[facility]  Renettaisadora Amezcuawaldemar Mckeonh Keith; 2016 01/18/2016   Final    A  Urine, Unspecified Source, :  Rare cluster of degenerated appearing urothelial cells present; see note  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  Few red blood cells also identified  Satisfactory for evaluation                  Image Results:   Image result are reviewed and documented in Hematology/Oncology history    IR nephrostomy tube check/change/reposition/reinsertion/upsize  Narrative: Bilateral nephrostomy exchange 7/28/2022    History: Routine exchange    Contrast: 12 mL of iodixanol (VISIPAQUE)    Fluoroscopy time: 1 7 MINUTES    Number of images: 8    Radiation dose: 72 mGy     Conscious sedation time: N/A    Procedure: The patient was identified verbally and by wristband  Timeout was performed  Patient was placed on the fluoroscopic table and prepped and draped in usual sterile fashion  The left percutaneous nephrostomy was injected with contrast and image   obtained  The 10 Croatian left nephrostomy was exchanged over a 0 035 short heavy wire for a new 10 Croatian nephrostomy  Contrast was injected through the newly placed nephrostomy and image obtained  Right cutaneous nephrostomy was injected with contrast and image obtained  The 10 Croatian nephrostomy was exchanged over a 0 035 short heavy wire for a new 10 Croatian nephrostomy  Contrast was injected through the newly placed nephrostomy and image   obtained  The catheters were secured in place and placed to gravity bag drainage  The patient tolerated the procedure well without apparent immediate complication  The patient left the IR department in unchanged condition  Dr Prince Eugene performed the procedure  Findings: The previously placed left nephrostomy had been pulled back into the infundibulum of the interpolar calyx  Ureter is tortuous and there appears to be a 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 bladder  There are successful exchange of the left nephrostomy with   the pigtail within the renal pelvis following exchange  The previously placed right nephrostomy was eccentrically located in the renal pelvis  The ureter is grossly patent with small nonobstructing filling defects along the course of the ureter  Following exchange of the 10 Croatian nephrostomy the pigtail was   within the renal pelvis    Impression: Impression: Successful routine exchange of bilateral 10 [place] nephrostomy    Workstation performed: [id number]      LABS:  Lab data are reviewed and documented in HemOnc history         Lab Results   Component Value Date    HGB 10 3 (L) 08/23/2022    HCT 32 8 (L) 08/23/2022    MCV 93 08/23/2022     08/23/2022    WBC 9 92 08/23/2022    NRBC 0 08/23/2022    BANDSPCT 3 02/04/2022    ATYLMPCT 1 (H) 02/04/2022     Lab Results   Component Value Date     09/03/2015    K 5 8 (H) 08/25/2022     08/25/2022    CO2 28 08/25/2022    ANIONGAP 5 09/03/2015    BUN 54 (H) 08/25/2022    CREATININE 3 48 (H) 08/25/2022    GLUCOSE 203 (H) 09/03/2015    GLUF 84 08/25/2022    CALCIUM 9 7 08/25/2022    CORRECTEDCA 10 6 (H) 08/25/2022    AST 29 08/25/2022    ALT 41 08/25/2022    ALKPHOS 134 (H) 08/25/2022    PROT 6 4 07/26/2015    BILITOT 0 50 07/26/2015    EGFR 15 08/25/2022       Lab Results   Component Value Date    IRON 186 (H) 12/10/2021    TIBC 377 12/10/2021    FERRITIN 23 12/10/2021    FERRITIN 40 10/13/2021    FERRITIN 31 06/22/2021    FERRITIN 23 02/17/2021    FERRITIN 52 12/19/2020    FERRITIN 73 09/19/2018       No results found for: [id number]    Recent Labs     08/23/22  1245   WBC 9 92       By:  Noe Alfonso MD, 8/26/2022, 8:47 AM

## 2022-08-18 NOTE — TELEPHONE ENCOUNTER
Spoke to pt to advise UC was negative for infection  Pt is experiencing "strings" of clots in urine with bad burning sensation with urination and pain in his left testicle  He states he is concerned that the cancer has spread to his testicle  Pt has history of high-grade muscle invasive bladder cancer and pulmonary nodules consistent with metastatic disease metabolically active on PET scan from 6/28/22  He has christofer nephrostomy tubes which he states are draining  Pt saw Dr Anca Baeza on 7/14/22 because  of dysuria and gross hematuria  He has a CT scheduled in October  Forwarding to provider for review and follow up recommendations      Forwarding to

## 2022-08-19 RX ORDER — METOPROLOL SUCCINATE 100 MG/1
TABLET, EXTENDED RELEASE ORAL
Qty: 90 TABLET | Refills: 0 | Status: SHIPPED | OUTPATIENT
Start: 2022-08-19

## 2022-08-19 RX ORDER — EZETIMIBE 10 MG/1
TABLET ORAL
Qty: 90 TABLET | Refills: 0 | Status: SHIPPED | OUTPATIENT
Start: 2022-08-19 | End: 2022-10-20 | Stop reason: SDUPTHER

## 2022-08-19 RX ORDER — DOCUSATE SODIUM 100 MG/1
CAPSULE, LIQUID FILLED ORAL
Qty: 100 CAPSULE | Refills: 0 | Status: SHIPPED | OUTPATIENT
Start: 2022-08-19 | End: 2022-09-26

## 2022-08-19 RX ORDER — DULOXETIN HYDROCHLORIDE 30 MG/1
CAPSULE, DELAYED RELEASE ORAL
Qty: 90 CAPSULE | Refills: 0 | Status: SHIPPED | OUTPATIENT
Start: 2022-08-19 | End: 2022-10-10

## 2022-08-19 RX ORDER — OXYBUTYNIN CHLORIDE 10 MG/1
TABLET, EXTENDED RELEASE ORAL
Qty: 30 TABLET | Refills: 0 | Status: SHIPPED | OUTPATIENT
Start: 2022-08-19 | End: 2022-09-17

## 2022-08-19 RX ORDER — SODIUM CHLORIDE 9 MG/ML
20 INJECTION, SOLUTION INTRAVENOUS ONCE
Status: CANCELLED | OUTPATIENT
Start: 2022-08-26

## 2022-08-23 ENCOUNTER — HOSPITAL ENCOUNTER (EMERGENCY)
Facility: HOSPITAL | Age: 77
Discharge: HOME | End: 2022-08-24
Attending: EMERGENCY MEDICINE
Payer: MEDICARE

## 2022-08-23 ENCOUNTER — TELEPHONE (OUTPATIENT)
Dept: HEMATOLOGY ONCOLOGY | Facility: CLINIC | Age: 77
End: 2022-08-23

## 2022-08-23 ENCOUNTER — APPOINTMENT (OUTPATIENT)
Dept: LAB | Facility: CLINIC | Age: 77
End: 2022-08-23
Payer: MEDICARE

## 2022-08-23 DIAGNOSIS — R11.0 NAUSEA: ICD-10-CM

## 2022-08-23 DIAGNOSIS — C67.3 MALIGNANT NEOPLASM OF ANTERIOR WALL OF URINARY BLADDER (HCC): Primary | ICD-10-CM

## 2022-08-23 DIAGNOSIS — E87.5 HYPERKALEMIA: Primary | ICD-10-CM

## 2022-08-23 DIAGNOSIS — C67.3 MALIGNANT NEOPLASM OF ANTERIOR WALL OF URINARY BLADDER (HCC): ICD-10-CM

## 2022-08-23 LAB
ALBUMIN SERPL BCP-MCNC: 3 G/DL (ref 3.5–5)
ALP SERPL-CCNC: 121 U/L (ref 46–116)
ALT SERPL W P-5'-P-CCNC: 39 U/L (ref 12–78)
ANION GAP SERPL CALCULATED.3IONS-SCNC: 4 MMOL/L (ref 4–13)
AST SERPL W P-5'-P-CCNC: 26 U/L (ref 5–45)
BASOPHILS # BLD AUTO: 0.05 THOUSANDS/ΜL (ref 0–0.1)
BASOPHILS NFR BLD AUTO: 1 % (ref 0–1)
BILIRUB SERPL-MCNC: 0.23 MG/DL (ref 0.2–1)
BUN SERPL-MCNC: 63 MG/DL (ref 5–25)
CALCIUM ALBUM COR SERPL-MCNC: 10.5 MG/DL (ref 8.3–10.1)
CALCIUM SERPL-MCNC: 9.7 MG/DL (ref 8.3–10.1)
CHLORIDE SERPL-SCNC: 106 MMOL/L (ref 96–108)
CO2 SERPL-SCNC: 26 MMOL/L (ref 21–32)
CREAT SERPL-MCNC: 3.31 MG/DL (ref 0.6–1.3)
EOSINOPHIL # BLD AUTO: 0.47 THOUSAND/ΜL (ref 0–0.61)
EOSINOPHIL NFR BLD AUTO: 5 % (ref 0–6)
ERYTHROCYTE [DISTWIDTH] IN BLOOD BY AUTOMATED COUNT: 13.5 % (ref 11.6–15.1)
GFR SERPL CREATININE-BSD FRML MDRD: 16 ML/MIN/1.73SQ M
GLUCOSE SERPL-MCNC: 143 MG/DL (ref 65–140)
HCT VFR BLD AUTO: 32.8 % (ref 36.5–49.3)
HGB BLD-MCNC: 10.3 G/DL (ref 12–17)
IMM GRANULOCYTES # BLD AUTO: 0.05 THOUSAND/UL (ref 0–0.2)
IMM GRANULOCYTES NFR BLD AUTO: 1 % (ref 0–2)
LYMPHOCYTES # BLD AUTO: 1.54 THOUSANDS/ΜL (ref 0.6–4.47)
LYMPHOCYTES NFR BLD AUTO: 16 % (ref 14–44)
MCH RBC QN AUTO: 29.2 PG (ref 26.8–34.3)
MCHC RBC AUTO-ENTMCNC: 31.4 G/DL (ref 31.4–37.4)
MCV RBC AUTO: 93 FL (ref 82–98)
MONOCYTES # BLD AUTO: 0.87 THOUSAND/ΜL (ref 0.17–1.22)
MONOCYTES NFR BLD AUTO: 9 % (ref 4–12)
NEUTROPHILS # BLD AUTO: 6.94 THOUSANDS/ΜL (ref 1.85–7.62)
NEUTS SEG NFR BLD AUTO: 68 % (ref 43–75)
NRBC BLD AUTO-RTO: 0 /100 WBCS
PLATELET # BLD AUTO: 250 THOUSANDS/UL (ref 149–390)
PMV BLD AUTO: 10.2 FL (ref 8.9–12.7)
POTASSIUM SERPL-SCNC: 5.9 MMOL/L (ref 3.5–5.3)
PROT SERPL-MCNC: 7.9 G/DL (ref 6.4–8.4)
RBC # BLD AUTO: 3.53 MILLION/UL (ref 3.88–5.62)
SODIUM SERPL-SCNC: 136 MMOL/L (ref 135–147)
WBC # BLD AUTO: 9.92 THOUSAND/UL (ref 4.31–10.16)

## 2022-08-23 PROCEDURE — 80048 BASIC METABOLIC PNL TOTAL CA: CPT | Performed by: PHYSICIAN ASSISTANT

## 2022-08-23 PROCEDURE — 96361 HYDRATE IV INFUSION ADD-ON: CPT

## 2022-08-23 PROCEDURE — 93005 ELECTROCARDIOGRAM TRACING: CPT

## 2022-08-23 PROCEDURE — 36415 COLL VENOUS BLD VENIPUNCTURE: CPT

## 2022-08-23 PROCEDURE — 99285 EMERGENCY DEPT VISIT HI MDM: CPT | Performed by: PHYSICIAN ASSISTANT

## 2022-08-23 PROCEDURE — 99283 EMERGENCY DEPT VISIT LOW MDM: CPT

## 2022-08-23 PROCEDURE — 96374 THER/PROPH/DIAG INJ IV PUSH: CPT

## 2022-08-23 PROCEDURE — 85025 COMPLETE CBC W/AUTO DIFF WBC: CPT

## 2022-08-23 PROCEDURE — 80053 COMPREHEN METABOLIC PANEL: CPT

## 2022-08-23 RX ORDER — ONDANSETRON 2 MG/ML
4 INJECTION INTRAMUSCULAR; INTRAVENOUS ONCE
Status: COMPLETED | OUTPATIENT
Start: 2022-08-24 | End: 2022-08-23

## 2022-08-23 RX ORDER — MORPHINE SULFATE 4 MG/ML
4 INJECTION, SOLUTION INTRAMUSCULAR; INTRAVENOUS ONCE
Status: COMPLETED | OUTPATIENT
Start: 2022-08-24 | End: 2022-08-24

## 2022-08-23 RX ADMIN — SODIUM CHLORIDE 500 ML: 0.9 INJECTION, SOLUTION INTRAVENOUS at 23:52

## 2022-08-23 RX ADMIN — ONDANSETRON 4 MG: 2 INJECTION INTRAMUSCULAR; INTRAVENOUS at 23:56

## 2022-08-23 NOTE — PROGRESS NOTES
Labs were obtained on 8/15/22  New labs entered into chart  Call out to patient for patient to get labs done either today or tomorrow  Did review with Dr Robert Pete, if patient does not get labs on 8/23 or 8/24  [id number] Lou Spaulding to utilize labs from 8/15  Patient to be reminded and recommended to get labs 3-5 days prior to treatment days  Left call back number and hours of operation to further discuss

## 2022-08-23 NOTE — TELEPHONE ENCOUNTER
Called patient  No answer x2  Called patient's wife no answer as well  Left voice message on both phone numbers that patient's potassium is too high at 5 9  He needs to report to the ED for further management of this as well as possible cardiac monitoring  I advised them to report there immediately once they get the message

## 2022-08-24 ENCOUNTER — TELEPHONE (OUTPATIENT)
Dept: HEMATOLOGY ONCOLOGY | Facility: CLINIC | Age: 77
End: 2022-08-24

## 2022-08-24 VITALS
BODY MASS INDEX: 30.48 KG/M2 | HEART RATE: 92 BPM | WEIGHT: 225 LBS | HEIGHT: 72 IN | OXYGEN SATURATION: 95 % | TEMPERATURE: 98.6 F | RESPIRATION RATE: 16 BRPM | SYSTOLIC BLOOD PRESSURE: 179 MMHG | DIASTOLIC BLOOD PRESSURE: 90 MMHG

## 2022-08-24 LAB
ANION GAP SERPL CALCULATED.3IONS-SCNC: 10 MMOL/L (ref 4–13)
ATRIAL RATE: 96 BPM
BUN SERPL-MCNC: 55 MG/DL (ref 5–25)
CALCIUM SERPL-MCNC: 9.3 MG/DL (ref 8.3–10.1)
CHLORIDE SERPL-SCNC: 101 MMOL/L (ref 96–108)
CO2 SERPL-SCNC: 27 MMOL/L (ref 21–32)
CREAT SERPL-MCNC: 3.23 MG/DL (ref 0.6–1.3)
GFR SERPL CREATININE-BSD FRML MDRD: 17 ML/MIN/1.73SQ M
GLUCOSE SERPL-MCNC: 170 MG/DL (ref 65–140)
POTASSIUM SERPL-SCNC: 5.5 MMOL/L (ref 3.5–5.3)
QRS AXIS: 26 DEGREES
QRSD INTERVAL: 102 MS
QT INTERVAL: 326 MS
QTC INTERVAL: 400 MS
SODIUM SERPL-SCNC: 138 MMOL/L (ref 135–147)
T WAVE AXIS: 149 DEGREES
VENTRICULAR RATE: 91 BPM

## 2022-08-24 PROCEDURE — 96361 HYDRATE IV INFUSION ADD-ON: CPT

## 2022-08-24 PROCEDURE — 93010 ELECTROCARDIOGRAM REPORT: CPT | Performed by: INTERNAL MEDICINE

## 2022-08-24 PROCEDURE — 96375 TX/PRO/DX INJ NEW DRUG ADDON: CPT

## 2022-08-24 RX ORDER — ONDANSETRON 4 MG/1
4 TABLET, ORALLY DISINTEGRATING ORAL EVERY 8 HOURS PRN
Qty: 10 TABLET | Refills: 0 | Status: SHIPPED | OUTPATIENT
Start: 2022-08-24 | End: 2022-09-02

## 2022-08-24 RX ADMIN — MORPHINE SULFATE 4 MG: 4 INJECTION INTRAVENOUS at 00:02

## 2022-08-24 NOTE — ED PROVIDER NOTES
History  Chief Complaint   Patient presents with    Abnormal Lab     Pt states he was told to come to ER because his "Potassium is alexandrea-high"  Pt has no specific symptoms but states he does not feel well  Patient presents to the emergency department with an elevated potassium level  He states he received a phone call from his doctor today and was sent here for further evaluation  Patient has chronic kidney disease and metastatic bladder cancer with Mets to his lung  Patient nephrostomy tubes which are outputting urine and he is also still urinating  He states that he sees blood in his urine both through the nephrostomy tubes and when he urinates secondary to his bladder cancer  Patient has ongoing pain to the left side of his chest which he states is secondary to the cancer  Patient states that he has not been feeling well and not wanting to eat or drink he states he is just not hungry  No vomiting prior to arrival - while here starts to dry heave  Wife is concerned he is dehydrated because he isnt eating or drinking much  No fevers  Pt states he has CP 2nd to the cancer in his lung  Prior to Admission Medications   Prescriptions Last Dose Informant Patient Reported? Taking?    ALPRAZolam (XANAX) 0 25 mg tablet  Self No No   Sig: At twice a day as needed for anxiety   ARIPiprazole (ABILIFY) 2 mg tablet  Self No No   Sig: Take 1 tablet (2 mg total) by mouth daily   Accu-Chek Softclix Lancets lancets  Self No No   Sig: Test blood sugar 4 times daily   Azelastine HCl 0 15 % SOLN  Self No No   Sig: Inhale 1 spray 2 (two) times a day   Bimatoprost (LUMIGAN OP)  Self Yes No   Sig: Apply 1 drop to eye daily at bedtime    DULoxetine (CYMBALTA) 30 mg delayed release capsule   No No   Sig: TAKE ONE CAPSULE BY MOUTH EVERY DAY   Ferrous Sulfate Dried (Feosol) 200 (65 Fe) MG TABS   No No   Sig: Take 65 mg by mouth daily   Patient not taking: No sig reported   Insulin Pen Needle 31G X 8 MM MISC  Self No No   Sig: Inject 3 times a day   Lantus SoloStar 100 units/mL injection pen  Self No No   Si units qhs   Patient taking differently: Inject 18 Units under the skin daily at bedtime   NovoLOG FlexPen 100 units/mL injection pen  Self No No   Sig: 10 units with each meal    Patient taking differently: Inject 10 Units under the skin 3 (three) times a day with meals   acetaminophen (TYLENOL) 325 mg tablet  Self Yes No   Sig: Take 650 mg by mouth every 6 (six) hours as needed for mild pain     aspirin (ECOTRIN LOW STRENGTH) 81 mg EC tablet  Self No No   Sig: Take 1 tablet (81 mg total) by mouth daily   calcitriol (ROCALTROL) 0 25 mcg capsule  Self No No   Sig: Take 1 capsule (0 25 mcg total) by mouth daily   docusate sodium (COLACE) 100 mg capsule   No No   Sig: TAKE ONE CAPSULE BY MOUTH TWICE A DAY   ezetimibe (ZETIA) 10 mg tablet   No No   Sig: TAKE ONE TABLET BY MOUTH ONCE DAILY IN THE EARLY MORNING   fluocinonide (LIDEX) 0 05 % cream  Self No No   Sig: Apply topically 2 (two) times a day   Patient taking differently: Apply topically as needed   fluticasone (FLONASE) 50 mcg/act nasal spray  Self No No   Si spray into each nostril daily   Patient taking differently: 1 spray into each nostril as needed   furosemide (LASIX) 20 mg tablet  Self No No   Sig: Take 1 tablet (20 mg total) by mouth daily   gabapentin (Neurontin) 300 mg capsule  Self No No   Sig: Take 1 capsule (300 mg total) by mouth daily at bedtime   glucose blood (Accu-Chek Martha Plus) test strip  Self No No   Sig: Test blood sugar 4 times daily   isosorbide mononitrate (IMDUR) 30 mg 24 hr tablet  Self No No   Sig: Take 1 tablet (30 mg total) by mouth daily in the early morning   lidocaine (XYLOCAINE) 2 % topical gel  Self No No   Sig: Apply topically as needed for mild pain   Patient not taking: No sig reported   lidocaine-prilocaine (EMLA) cream  Self No No   Sig: Apply topically as needed for mild pain   loperamide (IMODIUM) 2 mg capsule  Self Yes No   Sig: Take 2 mg by mouth 4 (four) times a day as needed for diarrhea   metoprolol succinate (TOPROL-XL) 100 mg 24 hr tablet   No No   Sig: TAKE ONE TABLET BY MOUTH ONCE DAILY   multivitamin (THERAGRAN) TABS  Self Yes No   Sig: Take 1 tablet by mouth daily  naloxone (NARCAN) 4 mg/0 1 mL nasal spray  Self No No   Sig: Administer 1 spray into a nostril  If no response after 2-3 minutes, give another dose in the other nostril using a new spray     Patient not taking: No sig reported   nitrofurantoin (MACROBID) 100 mg capsule   No No   Sig: Take 1 capsule (100 mg total) by mouth 2 (two) times a day   Patient not taking: No sig reported   omeprazole (PriLOSEC) 20 mg delayed release capsule  Self Yes No   Sig: Take 20 mg by mouth daily in the early morning PRN    oxyCODONE (OxyCONTIN) 10 mg 12 hr tablet  Self No No   Sig: Take 1 tablet (10 mg total) by mouth every 8 (eight) hours Max Daily Amount: 30 mg   oxyCODONE (Roxicodone) 5 immediate release tablet  Self No No   Sig: Take 1-1 5 tablets (5-7 5 mg total) by mouth every 4 (four) hours as needed for moderate pain (1 tab for moderate pain 1 5 tab for severe pain ) Max Daily Amount: 45 mg   oxybutynin (DITROPAN-XL) 10 MG 24 hr tablet   No No   Sig: TAKE ONE TABLET BY MOUTH ONCE DAILY   phenazopyridine (PYRIDIUM) 200 mg tablet  Self No No   Sig: Take 1 tablet (200 mg total) by mouth 3 (three) times a day with meals   Patient not taking: No sig reported   senna (SENOKOT) 8 6 MG tablet  Self Yes No   Sig: Take 1 tablet by mouth   sodium chloride, PF, 0 9 %   No No   Sig: 10 mL by Intracatheter route daily Intracatheter flushing daily   sodium chloride, PF, 0 9 %   No No   Sig: 10 mL by Intracatheter route daily Intracatheter flushing daily   sodium chloride, PF, 0 9 %   No No   Sig: 10 mL by Intracatheter route daily Bilateral PCNs to be flushed daily with prefilled 10cc NS   Patient not taking: No sig reported      Facility-Administered Medications: None       Past Medical History:   Diagnosis Date    Anemia     Last assessed: 9/28/17    Anxiety     Arteriosclerotic cardiovascular disease     Last assessed: 9/28/17    Arthritis     Bladder cancer ([facility] [address] )     bladder- had BCG treatments    Chronic kidney disease     Stage IV    CKD (chronic kidney disease) stage 4, GFR 15-29 ml/min ([facility])     Colon polyp     Coronary artery disease     7 stents    Depression     Diabetes mellitus ([facility])     IDDM    GERD (gastroesophageal reflux disease)     Glaucoma     Hematuria     History of fusion of cervical spine     Hyperlipidemia     Hypertension     Insomnia     Last assessed: 11/14/12    Loss of hearing     has hearing aids but usually does not wear them    Other seasonal allergic rhinitis     Last assessed: 2/10/16    PAD (peripheral artery disease) ([facility])     Shortness of breath     on exertion    Spinal stenosis of lumbar region     Transient cerebral ischemia     No Residual    Uses walker     w/c for longer distances       Past Surgical History:   Procedure Laterality Date    CARDIAC SURGERY      Cath stent placement  Last assessed: 3/9/17  Interventional Catheterization    CHOLECYSTECTOMY      COLONOSCOPY      CYSTOSCOPY      Diagnostic w/biopsy  Aimee Heredia  Last assessed: 12/1/14    CYSTOSCOPY N/A 4/12/2022    Procedure: CYSTOSCOPY  Bladder biopsies  ;  Surgeon: Sharon Pierce MD;  [facility];  Service: Urology    CYSTOSCOPY W/ RETROGRADES Right 3/1/2022    Procedure: CYSTO; stent removal retrograde;  Surgeon: Sharon Pierce MD;  [facility];  Service: Urology    CYSTOSCOPY W/ URETERAL STENT PLACEMENT Bilateral 10/18/2021    Procedure: bilateral retrogrades, cytology collection;  Surgeon: Sharon Pierce MD;  [facility];  Service: Urology    CYSTOURETHROSCOPY      w/cautery   Aimee Heredia    FL RETROGRADE PYELOGRAM  10/18/2021    FL RETROGRADE PYELOGRAM  10/24/2021    GASTRIC BYPASS      For morbid obesity w/Shaji-en-Y   Resolved: 11/17/09    INCISION AND DRAINAGE OF WOUND Right 2/26/2017    Procedure: INCISION AND DRAINAGE (I&D) EXTREMITY WITH APPLICATION OF GRAFT JACKET;  Surgeon: Felicity Vásquez DPM;  [facility];  Service:     INCISION AND DRAINAGE OF WOUND Right 4/25/2017    Procedure: INCISION AND DRAINAGE (I&D) EXTREMITY, APPLICATION OF GRAFT;  Surgeon: Felicity Vásquez DPM;  [facility];  Service:     IR BIOPSY OTHER  7/2/2020    IR LOWER EXTREMITY ANGIOGRAM  2/8/2021    IR LOWER EXTREMITY ANGIOGRAM  2/11/2021    IR NEPHROSTOMY TUBE CHECK/CHANGE/REPOSITION/REINSERTION/UPSIZE  4/28/2022    IR NEPHROSTOMY TUBE CHECK/CHANGE/REPOSITION/REINSERTION/UPSIZE  5/24/2022    IR NEPHROSTOMY TUBE CHECK/CHANGE/REPOSITION/REINSERTION/UPSIZE  6/7/2022    IR NEPHROSTOMY TUBE CHECK/CHANGE/REPOSITION/REINSERTION/UPSIZE  7/28/2022    IR NEPHROSTOMY TUBE PLACEMENT  2/25/2022    IR PORT PLACEMENT  1/17/2022    IR TUNNELED CENTRAL LINE PLACEMENT  12/24/2020    JOINT REPLACEMENT      christofer knees replaced    LA AMPUTATION METATARSAL+TOE,SINGLE Left 12/21/2020    Procedure: RAY RESECTION FOOT;  Surgeon: Anders Parkinson DPM;  [facility];  Service: Podiatry    LA AMPUTATION METATARSAL+TOE,SINGLE Left 12/31/2020    Procedure: 5TH MET RESECTION;  Surgeon: Anders Parkinson DPM;  [facility];  Service: Podiatry    LA CYSTOURETHROSCOPY W/IRRIG & EVAC CLOTS N/A 2/10/2021    Procedure: CYSTOSCOPY EVACUATION OF CLOTS, fulguration;  Surgeon: Sundeep Dewitt MD;  [facility];  Service: Urology    LA CYSTOURETHROSCOPY W/IRRIG & EVAC CLOTS N/A 10/24/2021    Procedure: CYSTOSCOPY EVACUATION OF CLOT, fulguration of bleeding vessels, right ureter stent placement, retrograde pyelogram;  Surgeon: Maggie Sullivan MD;  [facility];  Service: Urology    LA CYSTOURETHROSCOPY,BIOPSY N/A 8/16/2016    Procedure: CYSTOSCOPY WITH BIOPSIES;  Surgeon: Oliverio Bui MD;  [facility]; Service: Urology    IA CYSTOURETHROSCOPY,FULGUR <0 5 CM LESN N/A 2020    Procedure: CYSTO W/BIOPSIES, transurethral prostate bx;  Surgeon: Nithya Mac MD;  [facility];  Service: Urology    IA CYSTOURETHROSCOPY,FULGUR >5 CM LESN Bilateral 10/18/2021    Procedure: TRANSURETHRAL RESECTION OF BLADDER TUMOR (TURBT); Surgeon: Flaca Sargent MD;  [facility];  Service: Urology    IA [place] 3RD+ ORD [address] PEL/LXTR [facility] Left 2021    Procedure: LEG angiogram, CO2 w/limited contrast with balloon angioplasty postertior tibial artery;  Surgeon: Raza Bradshaw MD;  [facility];  Service: Vascular    ROTATOR CUFF REPAIR      SMALL INTESTINE SURGERY      Surgery Shaji-en-Y    SPINAL FUSION      lumbar and cervical fusions    VAC DRESSING APPLICATION Right 1813    Procedure: APPLICATION VAC DRESSING;  Surgeon: Kimmy Jaimes DPM;  [facility];  Service:    [address] [place] [address] Left 2021    Procedure: FOOT DEBRIDE, [address] OUT w/graft application;  Surgeon: Kimmy Jaimes DPM;  [facility];  Service: Podiatry       Family History   Problem Relation Age of Onset    Diabetes Mother     Heart disease Mother     Other Mother         High blood pressure    Heart disease Father     Diabetes Sister     Other Sister         High blood pressure    Kidney disease Sister     Heart disease Brother     Other Brother         High blood pressure     I have reviewed and agree with the history as documented      E-Cigarette/Vaping    E-Cigarette Use Never User      E-Cigarette/Vaping Substances    Nicotine No     THC No     CBD No     Flavoring No     Other No     Unknown No      Social History     Tobacco Use    Smoking status: Former Smoker     Packs/day: 3 00     Years: 27 00     Pack years: 81 00     Types: Cigarettes     Quit date: 1970     Years since quittin 6    Smokeless tobacco: Never Used   Vaping Use    Vaping Use: Never used Substance Use Topics    Alcohol use: Never     Comment: beer / liquor    Drug use: Not Currently     Types: Marijuana     Comment: quit 2019 had medical marijuana       Review of Systems   Constitutional: Negative for fever  HENT: Negative for congestion  Cardiovascular: Positive for chest pain  Gastrointestinal: Negative for vomiting  Anorexia   Genitourinary: Positive for hematuria  All other systems reviewed and are negative  Physical Exam  Physical Exam  Vitals and nursing note reviewed  Constitutional:       Appearance: He is well-developed  HENT:      Head: Normocephalic and atraumatic  Right Ear: External ear normal       Left Ear: External ear normal    Eyes:      Conjunctiva/sclera: Conjunctivae normal    Cardiovascular:      Rate and Rhythm: Normal rate and regular rhythm  Heart sounds: Normal heart sounds  Pulmonary:      Effort: Pulmonary effort is normal       Breath sounds: Normal breath sounds  Chest:      Chest wall: Tenderness present  Abdominal:      General: Bowel sounds are normal       Palpations: Abdomen is soft  Comments: Bilateral nephrostomy tubes in place  Musculoskeletal:         General: Normal range of motion  Cervical back: Normal range of motion and neck supple  Lymphadenopathy:      Cervical: No cervical adenopathy  Skin:     General: Skin is warm  Findings: No rash  Neurological:      Mental Status: He is alert and oriented to person, place, and time  Motor: No abnormal muscle tone        Coordination: Coordination normal    Psychiatric:         Behavior: Behavior normal          Vital Signs  ED Triage Vitals [08/23/22 1833]   Temperature Pulse Respirations Blood Pressure SpO2   98 6 °F (37 °C) 85 18 132/88 99 %      Temp Source Heart Rate Source Patient Position - Orthostatic VS BP Location FiO2 (%)   Oral Monitor Sitting Right arm --      Pain Score       No Pain           Vitals:    08/23/22 1833 08/23/22 2359   BP: 132/88 165/98   Pulse: 85 88   Patient Position - Orthostatic VS: Sitting Lying         Visual Acuity      ED Medications  Medications   sodium chloride 0 9 % bolus 500 mL (500 mL Intravenous New Bag 8/23/22 2352)   ondansetron (ZOFRAN) injection 4 mg (4 mg Intravenous Given 8/23/22 2356)   morphine injection 4 mg (4 mg Intravenous Given 8/24/22 0002)       Diagnostic Studies  Results Reviewed     Procedure Component Value Units Date/Time    Basic metabolic panel [[id number]]  (Abnormal) Collected: 08/23/22 2345    Lab Status: Final result Specimen: Blood from Arm, Right Updated: 08/24/22 0021     Sodium 138 mmol/L      Potassium 5 5 mmol/L      Chloride 101 mmol/L      CO2 27 mmol/L      ANION GAP 10 mmol/L      BUN 55 mg/dL      Creatinine 3 23 mg/dL      Glucose 170 mg/dL      Calcium 9 3 mg/dL      eGFR 17 ml/min/1 73sq m     Narrative:      [place] guidelines for Chronic Kidney Disease (CKD):     Stage 1 with normal or high GFR (GFR > 90 mL/min/1 73 square meters)    Stage 2 Mild CKD (GFR = 60-89 mL/min/1 73 square meters)    Stage 3A Moderate CKD (GFR = 45-59 mL/min/1 73 square meters)    Stage 3B Moderate CKD (GFR = 30-44 mL/min/1 73 square meters)    Stage 4 Severe CKD (GFR = 15-29 mL/min/1 73 square meters)    Stage 5 End Stage CKD (GFR <15 mL/min/1 73 square meters)  Note: GFR calculation is accurate only with a steady state creatinine                 No orders to display              Procedures  ECG 12 Lead Documentation Only    Date/Time: 8/23/2022 11:59 PM  Performed by: Len Kam PA-C  Authorized by: Len Kam PA-C     Indications / Diagnosis:  Hyper k  Patient location:  ED  Previous ECG:     Previous ECG:  Compared to current    Comparison ECG info:   May 4, 22  Rate:     ECG rate:  91    ECG rate assessment: normal    Rhythm:     Rhythm: sinus rhythm    Ectopy:     Ectopy: none    QRS:     QRS axis:  Normal  Conduction:     Conduction: normal ST segments:     ST segments:  Normal  T waves:     T waves: normal      T waves comment:  NO peeked T waves             ED Course  ED Course as of 08/24/22 0045   Wed Aug 24, 2022   0001 Pt becoming nauseated here and dry heaving  Will medicate here    0026 Morphine controlling his pain - usually takes oxy at home - has been in waiting room and didn't take his pain meds  Nausea controlled  Giving PO challenge  Potassium - 5 5 discussed with DR Irina Lundberg - no EKG changes  Discussed with pt and wife                                             MDM  Number of Diagnoses or Management Options  Hyperkalemia: new and requires workup  Diagnosis management comments: Potassium elivated today - discussed eval and tx plan with pt  And family        Amount and/or Complexity of Data Reviewed  Clinical lab tests: reviewed  Obtain history from someone other than the patient: yes  Review and summarize past medical records: yes  Discuss the patient with other providers: yes (Peteyrmaggie )    Risk of Complications, Morbidity, and/or Mortality  Management options: high  General comments: tolerating PO  Pain controlled      Discussed options and plan with pt, pt feels better and would like to go  home and will FU with his drs as an out pt  Patient Progress  Patient progress: improved      Disposition  Final diagnoses:   Hyperkalemia   Nausea     Time reflects when diagnosis was documented in both MDM as applicable and the Disposition within this note     Time User Action Codes Description Comment    8/24/2022 12:30 AM Fabby Evans [E87 5] Hyperkalemia     8/24/2022 12:40 AM Fabby Evans [R11 0] Nausea       ED Disposition     ED Disposition   Discharge    Condition   Stable    Date/Time   Wed Aug 24, 2022 12:30 AM    Comment   Yamilex Banks discharge to home/self care  Follow-up Information     Follow up With Specialties Details Why 800 Bhavik Cherry MD Internal Medicine   [address]   [facility] [address]  [phone]            Patient's Medications   Discharge Prescriptions    ONDANSETRON (ZOFRAN-ODT) 4 MG DISINTEGRATING TABLET    Take 1 tablet (4 mg total) by mouth every 8 (eight) hours as needed for nausea or vomiting for up to 7 days       Start Date: 8/24/2022 End Date: 8/31/2022       Order Dose: 4 mg       Quantity: 10 tablet    Refills: 0       No discharge procedures on file      PDMP Review       Value Time User    PDMP Reviewed  Yes 6/10/2022  1:31 PM April NO [place]          ED Provider  Electronically Signed by           Rosangela Henderson PA-C  08/24/22 365 [facility] BO [place]  08/24/22 0045

## 2022-08-24 NOTE — TELEPHONE ENCOUNTER
Left voicemail on patient preferred phone number to review Dr Noe Sanders recommendations of repeat labs needed by the end of the week  Left call back number and hours of operation to further discuss

## 2022-08-25 ENCOUNTER — TELEPHONE (OUTPATIENT)
Dept: HEMATOLOGY ONCOLOGY | Facility: CLINIC | Age: 77
End: 2022-08-25

## 2022-08-25 ENCOUNTER — APPOINTMENT (OUTPATIENT)
Dept: LAB | Facility: CLINIC | Age: 77
End: 2022-08-25
Payer: MEDICARE

## 2022-08-25 DIAGNOSIS — C67.3 MALIGNANT NEOPLASM OF ANTERIOR WALL OF URINARY BLADDER (HCC): ICD-10-CM

## 2022-08-25 DIAGNOSIS — R07.81 RIB PAIN ON LEFT SIDE: ICD-10-CM

## 2022-08-25 LAB
ALBUMIN SERPL BCP-MCNC: 2.9 G/DL (ref 3.5–5)
ALP SERPL-CCNC: 134 U/L (ref 46–116)
ALT SERPL W P-5'-P-CCNC: 41 U/L (ref 12–78)
ANION GAP SERPL CALCULATED.3IONS-SCNC: 2 MMOL/L (ref 4–13)
AST SERPL W P-5'-P-CCNC: 29 U/L (ref 5–45)
BILIRUB SERPL-MCNC: 0.33 MG/DL (ref 0.2–1)
BUN SERPL-MCNC: 54 MG/DL (ref 5–25)
CALCIUM ALBUM COR SERPL-MCNC: 10.6 MG/DL (ref 8.3–10.1)
CALCIUM SERPL-MCNC: 9.7 MG/DL (ref 8.3–10.1)
CHLORIDE SERPL-SCNC: 105 MMOL/L (ref 96–108)
CO2 SERPL-SCNC: 28 MMOL/L (ref 21–32)
CREAT SERPL-MCNC: 3.48 MG/DL (ref 0.6–1.3)
GFR SERPL CREATININE-BSD FRML MDRD: 15 ML/MIN/1.73SQ M
GLUCOSE P FAST SERPL-MCNC: 84 MG/DL (ref 65–99)
POTASSIUM SERPL-SCNC: 5.8 MMOL/L (ref 3.5–5.3)
PROT SERPL-MCNC: 8.2 G/DL (ref 6.4–8.4)
SODIUM SERPL-SCNC: 135 MMOL/L (ref 135–147)

## 2022-08-25 PROCEDURE — 80053 COMPREHEN METABOLIC PANEL: CPT

## 2022-08-25 PROCEDURE — 36415 COLL VENOUS BLD VENIPUNCTURE: CPT

## 2022-08-25 PROCEDURE — 86140 C-REACTIVE PROTEIN: CPT

## 2022-08-25 RX ORDER — FUROSEMIDE 10 MG/ML
10 INJECTION INTRAMUSCULAR; INTRAVENOUS ONCE
Status: CANCELLED
Start: 2022-08-26 | End: 2022-08-26

## 2022-08-25 NOTE — PROGRESS NOTES
Call out to [facility] in regards to Dr Mari Kim recommendations for patient to have lasix 10mg IV and 500mL NSS bolus over 1 hour  Jay Gray stated able to accomodate if patient comes in at 09:30am for treatment and additional fluids  Reviewed that infusion able to obtain TSH lab and proceed without results  Please encourage patient to talk to Nephrologist      Call out to patient to remind of appointment with Dr Mari Kim at [phone] at  office and asked to report to [facility] infusion center for 0930am for treatment and extra fluids  Left vm and left call back number with hours of operation to further discuss   Did mention appointments dates and times for 8/26

## 2022-08-25 NOTE — TELEPHONE ENCOUNTER
Call out to [facility] nephrology in regards to patient hyperkalemia  Reviewed that Dr Waldemar Medel is looking for recommendations  Left message on Dr Rosanna Jackman nurse's line with call back number and hours of operation to further discuss  95

## 2022-08-25 NOTE — TELEPHONE ENCOUNTER
Called patient  No answer x2  Called patient's wife  No answer x2  Our nurse already called Nephrology office for further recommendations regarding hyperkalemia  We are waiting additional recommendations  I left a voicemail to patient's wife to reach out to Nephrology office and call us if she has any additional questions, concerns regarding hyperkalemia  I cannot tiger text nephrologist as this Dr  Works at [employer]

## 2022-08-25 NOTE — TELEPHONE ENCOUNTER
----- Message from Adalberto Dubois MD sent at 8/25/2022 12:10 PM EDT -----  B,    Are you open to helping manage hyperkalemia by sending lasix/albuterol as that tends to help   Amarilis Cavazos, can we have him call his nephrologist to see if he has further thoughts?    ----- Message -----  From: Lab, Background User  Sent: 8/25/2022  11:54 AM EDT  To: Adalberto Dubois MD

## 2022-08-25 NOTE — PROGRESS NOTES
Call received from Elizabeth Kim, RN at Dr Francy Jeff office  Ok to treat on 8/26 w/creatinine 3 48 and potassium 5 8  Hydration and IV Lasix to be added to tx plan  Also, pt will need CBC/TSH drawn  Ok to proceed w/pending labs

## 2022-08-26 ENCOUNTER — TELEPHONE (OUTPATIENT)
Dept: PALLIATIVE MEDICINE | Facility: CLINIC | Age: 77
End: 2022-08-26

## 2022-08-26 ENCOUNTER — TELEPHONE (OUTPATIENT)
Dept: HEMATOLOGY ONCOLOGY | Facility: CLINIC | Age: 77
End: 2022-08-26

## 2022-08-26 ENCOUNTER — HOSPITAL ENCOUNTER (OUTPATIENT)
Dept: INFUSION CENTER | Facility: HOSPITAL | Age: 77
End: 2022-08-26
Attending: INTERNAL MEDICINE
Payer: MEDICARE

## 2022-08-26 ENCOUNTER — OFFICE VISIT (OUTPATIENT)
Dept: HEMATOLOGY ONCOLOGY | Facility: CLINIC | Age: 77
End: 2022-08-26
Payer: MEDICARE

## 2022-08-26 ENCOUNTER — HOSPITAL ENCOUNTER (OUTPATIENT)
Dept: CT IMAGING | Facility: HOSPITAL | Age: 77
End: 2022-08-26
Attending: INTERNAL MEDICINE
Payer: MEDICARE

## 2022-08-26 VITALS
HEART RATE: 88 BPM | SYSTOLIC BLOOD PRESSURE: 128 MMHG | WEIGHT: 226 LBS | BODY MASS INDEX: 30.61 KG/M2 | TEMPERATURE: 97.7 F | HEIGHT: 72 IN | DIASTOLIC BLOOD PRESSURE: 70 MMHG | OXYGEN SATURATION: 96 % | RESPIRATION RATE: 16 BRPM

## 2022-08-26 DIAGNOSIS — C67.3 MALIGNANT NEOPLASM OF ANTERIOR WALL OF URINARY BLADDER (HCC): Primary | ICD-10-CM

## 2022-08-26 DIAGNOSIS — E87.5 HYPERKALEMIA: ICD-10-CM

## 2022-08-26 DIAGNOSIS — J90 LARGE PLEURAL EFFUSION: Primary | ICD-10-CM

## 2022-08-26 DIAGNOSIS — C67.3 MALIGNANT NEOPLASM OF ANTERIOR WALL OF URINARY BLADDER (HCC): ICD-10-CM

## 2022-08-26 DIAGNOSIS — R07.81 RIB PAIN ON LEFT SIDE: ICD-10-CM

## 2022-08-26 LAB
CRP SERPL QL: 109 MG/L
TSH SERPL DL<=0.05 MIU/L-ACNC: 1.54 UIU/ML (ref 0.45–4.5)

## 2022-08-26 PROCEDURE — 84443 ASSAY THYROID STIM HORMONE: CPT | Performed by: INTERNAL MEDICINE

## 2022-08-26 PROCEDURE — 74176 CT ABD & PELVIS W/O CONTRAST: CPT

## 2022-08-26 PROCEDURE — G1004 CDSM NDSC: HCPCS

## 2022-08-26 PROCEDURE — 71250 CT THORAX DX C-: CPT

## 2022-08-26 PROCEDURE — 96361 HYDRATE IV INFUSION ADD-ON: CPT

## 2022-08-26 PROCEDURE — 96413 CHEMO IV INFUSION 1 HR: CPT

## 2022-08-26 PROCEDURE — 99214 OFFICE O/P EST MOD 30 MIN: CPT | Performed by: INTERNAL MEDICINE

## 2022-08-26 PROCEDURE — 96375 TX/PRO/DX INJ NEW DRUG ADDON: CPT

## 2022-08-26 RX ORDER — FUROSEMIDE 10 MG/ML
10 INJECTION INTRAMUSCULAR; INTRAVENOUS ONCE
Status: COMPLETED | OUTPATIENT
Start: 2022-08-26 | End: 2022-08-26

## 2022-08-26 RX ORDER — SODIUM CHLORIDE 9 MG/ML
20 INJECTION, SOLUTION INTRAVENOUS ONCE
Status: COMPLETED | OUTPATIENT
Start: 2022-08-26 | End: 2022-08-26

## 2022-08-26 RX ADMIN — FUROSEMIDE 10 MG: 10 INJECTION, SOLUTION INTRAVENOUS at 10:01

## 2022-08-26 RX ADMIN — SODIUM CHLORIDE 400 MG: 9 INJECTION, SOLUTION INTRAVENOUS at 11:09

## 2022-08-26 RX ADMIN — SODIUM CHLORIDE 500 ML: 0.9 INJECTION, SOLUTION INTRAVENOUS at 09:57

## 2022-08-26 RX ADMIN — SODIUM CHLORIDE 20 ML/HR: 0.9 INJECTION, SOLUTION INTRAVENOUS at 11:00

## 2022-08-26 NOTE — PROGRESS NOTES
Pt tolerated 500 mL NSS hydration, IV Lasix, and Keytruda infusion without difficulty  No s/s reaction noted  Port flushed and deaccessed per protocol  Next appt confirmed and AVS provided  Escorted to lobby in w/c for CT scan by volunteer

## 2022-08-26 NOTE — PROGRESS NOTES
Pt with new large L pleural effusion contributing to his pleuritic CP  Getting IR to facilitate in L thoracentesis and this has been set-up for 8/30  Nephrology contacted and Dr Chandrika Darnell will assist managing the hyperkalemia that is 2/2 progressing CKD V      Lora Meadows MD

## 2022-08-26 NOTE — TELEPHONE ENCOUNTER
Confirmed with Spencer Houston appts for CT on 8/26/22, Bone Scan on 8/30/22 and f/u appt  On 9/2/22 w/ Dr Lena Blount

## 2022-08-26 NOTE — PROGRESS NOTES
Hematology/Oncology Outpatient Office Note    Date of Service: 2022    [facility] HEMATOLOGY ONCOLOGY SPECIALISTS STEPHENMARYBETH Castillonorman  [facility]  [phone]    Reason for Consultation:   Chief Complaint   Patient presents with    Follow-up       Cancer Stage at diagnosis: II, interval progression to Stage IV    Referral Physician: No ref  provider found    Primary Care Physician:  Katie Johnson MD     Nickname: Ishmael Dykes    Spouse: Cher Montesinos    Granddaughter: Ja Tyler     Original ECO    Today's ECO    Goals and Barriers:  Current Goal: Minimize effects of disease burden, extend life  Barriers to accomplishing this: fatigue, depression, anxiety, worry, L foot claudication, lower back pain from spinal stenosis and uses a cane a lot of the time    Patient's Capacity to Self Care:  Patient is able to self care    Code Status: Full code    Advanced directives: not on file but I recommended he get that done    ASSESSMENT & PLAN      Diagnosis ICD-10-CM Associated Orders   1   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)  C67 3 CT chest abdomen pelvis wo contrast     Infusion Calculated Appointment Request     CBC and differential     Comprehensive metabolic panel     Infusion Calculated Appointment Request     CBC and differential     Infusion Calculated Appointment Request     CBC and differential     Infusion Calculated Appointment Request     CBC and differential     Comprehensive metabolic panel     Infusion Calculated Appointment Request     CBC and differential     Infusion Calculated Appointment Request     CBC and differential     Infusion Calculated Appointment Request     CBC and differential     Comprehensive metabolic panel     Infusion Calculated Appointment Request     CBC and differential     Infusion Calculated Appointment Request     CBC and differential     Infusion Calculated Appointment Request     CBC and differential Comprehensive metabolic panel     Infusion Calculated Appointment Request     CBC and differential     Infusion Calculated Appointment Request     CBC and differential     Infusion Calculated Appointment Request     CBC and differential     Comprehensive metabolic panel     Infusion Calculated Appointment Request     CBC and differential     Infusion Calculated Appointment Request     CBC and differential     Infusion Calculated Appointment Request     CBC and differential     Comprehensive metabolic panel     Infusion Calculated Appointment Request     CBC and differential     Infusion Calculated Appointment Request     CBC and differential   2  Arthralgia, unspecified joint  M25 50 predniSONE 20 mg tablet   3  Chemotherapy induced nausea and vomiting  R11 2 ondansetron (Zofran ODT) 8 mg disintegrating tablet    T45 1X5A        This is a 76 y o  c PMHx notable for spinal stenosis, Gastric bypass (2011), 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, diabetic nephropathy, and renal vascular disease, DM, being seen in consultation for bladder cancer       From an overall performance status basis, I believe Sagar Rolle can tolerate systemic treatment due to an ECOG PS of 2  A shared decision making approach was employed during today's visit  Thoughts on approach to systemic therapy in the first line and subsequent lines of therapy:    My favored first line is cisplatin/ gemcitabine however the patient's overall performance status and kidney function is not amenable to this      Thoughts on prediction for Grade III-V toxicity in elderly patients to systemic chemotherapy:  Age >72 years   GI/ cancers  Falls in last 6 months   Hearing impairment (b/l hearing aids)  Limited ability to walk 1 block  Requires assistance with medications  Decreased social activities   Prakash Banda JCO 2011)  BMI<18 5 or moderate or severe weight loss or decrease in food intake in past 3 months  Mild vs moderate dementia/depression     The above 5 factors predict toxicity for the patient if he undergoes systemic chemotherapy and I discussed this with the patient  For unresectable cases: ICI can be employed if there is POD after definitive CRT    Discussion of disease response monitoring: We discussed with the patient at length the role of imaging and potential blood monitoring of burden of disease  We will opt to get CT chest, abdomen, pelvis without contrast due to kidney function as surveillance following conclusion of therapy  As there has been progression of disease, our plan is to employ liquid biopsy to assess actionable biomarkers and determine updated bio-marker status  1/27/22 pt only got 1/2 of Days 1-5 5-FU due to contents spilling  Pt completed concurrent 5-FU/MTC + RT and found to have POD on 6/28/2022 PET/CT (lung mets) and had 1 cycle of q6 week Keytruda before POD  Decision made to transition to second line therapy for increased objective response and time to response due to significant burden of disease  Discussion of decision making   Oncology history updated, accordingly, during this visit   Goals of care/patient communication  o I discussed with the patient the clinical course leading up to their cancer diagnosis  I reviewed relevant office notes, imaging reports and pathology result as well   o I told the patient that this is a case of incurable disease and what this means  We discussed that the goal of anti-cancer therapy is to provide best quality of life, extend overall survival, and progression free survival as shown in clinical trials   We also discussed that there might be a point when the cancer will no longer respond to this anti-neoplastic therapy and thus he is seeing palliative care    o I explained the risks/benefits of the proposed cancer therapy: Enfortumab and after discussion including understanding risks of possible life-threatening complications and therapy-related malignancy development, informed consent for treatment has been signed   TNM/Staging At Diagnosis  Cancer Staging  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)  Staging form: Urinary Bladder, AJCC 8th Edition  - Clinical stage from 10/18/2021: Stage II (cT2, cN0, cM0) - Signed by Adalberto Dubois MD on 11/17/2021  Stage prefix: Initial diagnosis   Disease Features/Tumor Markers/Genetics  o Tumor Marker: n/a  o Notable Path Features: anterior wall Invasive high-grade urothelial carcinoma  Carcinoma invades muscularis propria (detrusor muscle)   Guardant 360 showing CDK4 amplification, TP53 mutation   Treatment:  Enfortumab   Other Supportive care:    Treatment Team Members  o Surgeon: Dr Myrna Bynum: Dr Shilpa Corona Palliative: April Cordell Neff  o Nephrology: Dr Moira Moran: creat 2 81 (eGFR 24), normocytic anemia, Hgb 9 8   Diagnostics: 11/11/21 CT CAP w/o c: 1  No metastatic disease within the limitations of noncontrast technique in the chest abdomen or pelvis  2   Diffuse hemicircumferential smooth bladder wall thickening on the left, similar to the prior study when allowing for differences in bladder distention  Prostatomegaly  6/3/2022 CT CAP w/o c: Development of a few pulmonary nodules, the largest measuring 8 mm  A PET CT would be recommended  Worsening wall thickening of the urinary bladder which may represent post treatment changes  Cannot exclude cystitis or progression of cancer  6/28/2022 PET/CT: read pending, per my assessment lung metastatic hypermetabolic nodules as well as L chest wall nodule  8/26/2022 CT CAP w/o c: Pt with new large L pleural effusion contributing to his pleuritic CP  There is now bulky retroperitoneal lymphadenopathy, not seen on the prior study  The largest lymph node measures approximately 3 2 x 2 7 cm and is located to the left of the aorta concerning for metastatic disease   There is a lobulated 1 cm nodule in the left upper lobe on series 3, image 85 not seen on the prior study  There is a 7 mm nodule in the right middle lobe, increased in size since prior study with surrounding groundglass density, nonspecific  Stable 2 mm nodule right middle lobe series 3, image 85  Previously noted nodule adjacent to the major fissure in the lingula is obscured by patchy airspace consolidation  8/30/2022 NM Bone scan: No scintigraphic evidence of osseous metastasis    Port is without acute issues  Enfortumab:   Overall, 55 participants (44%) with KUMAR  15 patients had CR  Responses to treatment lasted a median of mPFS 7 6 months  Discussion of decision making    I personally communicated with Dr Sasha Givens on this case and have reviewed the following lab results, the image studies, pathology, other specialty/physicians consult notes and recommendations, and outside medical records from [facility]  I had a lengthy discussion with the patient and shared the work-up findings  We discussed the diagnosis and management plan as below  I spent 32 reviewing the records (labs, clinician notes, outside records, medical history, ordering medicine/tests/procedures, interpreting the imaging/labs previously done) and coordination of care as well as direct time with the patient today, of which greater than 50% of the time was spent in counseling and coordination of care with the patient/family  · Plan/Labs  · Continue to assess for signs of distress as he has anxiety/depression   F/u Palliative for cancer associated pain  · F/u Urology for care and management of neph tubes  · D/C Keytruda  · Infusion chairs to be ordered today for Enfortumab q28 day cycles with preceding labs  · IR L thoracentesis scheduled  · Pred 20mg x5 days if gen joint pain, L pleuritic rib/cough doesn't improve  · F/u nephrology for dialysis preparation and hyperkalemia management  · Restaging CT CAP in 3 months ordered today      Follow Up:  q2 weeks while on q28 day systemic therapy    All questions were answered to the patient's satisfaction during this encounter  The patient knows the contact information for our office and knows to reach out for any relevant concerns related to this encounter  They are to call for any temperature 100 4 or higher, new symptoms including but not restricted to shaking chills, decreased appetite, nausea, vomiting, diarrhea, increased fatigue, shortness of breath or chest pain, confusion, and not feeling the strength to come to the clinic  For all other listed problems and medical diagnosis in their chart - they are managed by PCP and/or other specialists, which the patient acknowledges  Thank you very much for your consultation and making us a part of this patient's care  We are continuing to follow closely with you  Please do not hesitate to reach out to me with any additional questions or concerns  Carol Morris MD  Hematology & Medical Oncology Staff Physician             Disclaimer: This document was prepared using [employer] technology  If a word or phrase is confusing, or does not make sense, this is likely due to recognition error which was not discovered during this clinician's review  If you believe an error has occurred, please contact me through [address] [place] line for godfrey? 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)    Bladder, left posterior wall, biopsy: High-grade urothelial carcinoma in-situ  Muscularis propria is identified with no tumor seen  Bladder, trigone, biopsy: Non-invasive high-grade papillary urothelial carcinoma, and flat urothelial carcinoma in-situ  Muscularis propria is not identified          1/4/2017 - 2/8/2017 Chemotherapy    Induction intravesical BCG     6/19/2017 Surgery    TURBT  [facility])    - Posterior wall, biopsy: Mild chronic cystitis with focal urothelial atypia  Muscularis propria is identified      - Right lateral wall, biopsy: Granulomatous cystitis  Muscularis propria is identified  - Left lateral wall, biopsy: Non invasive high-grade urothelial carcinoma  Muscularis propria is not identified  4/26/2019 Surgery    TURBT   [facility])     - bladder, right posterior wall, biopsy: Urothelial carcinoma in situ   - bladder, right lateral wall, biopsy: Small focus of urothelial carcinoma in situ  - bladder, left posterior wall, biopsy: Urothelial carcinoma in situ   - bladder, left lateral wall, biopsy: Urothelial carcinoma in situ     - bladder, trigone, biopsy: Invasive high-grade urothelial carcinoma, invading into lamina propria  No lymphovascular invasion seen  Muscularis propria not present  Separate piece showing urothelial carcinoma in situ        7/2019 -  Chemotherapy    Induction intravesical BCG x 4      11/4/2019 Surgery    TURBT  [facility])    - Superficial bladder tumor, transurethral resection: Non-invasive high grade urothelial carcinoma, with urothelial carcinoma in situ  Muscularis propria is not identified      - Bladder tumor, transurethral resection: Non-invasive high grade urothelial carcinoma, with urothelial carcinoma in situ, see note  Muscularis propria is present and free of tumor  12/9/2019 - 1/28/2020 Chemotherapy    Induction intravesical BCG+INF        6/9/2020 - 6/23/2020 Chemotherapy    Maintenance intravesical BCG+INF        11/19/2020 Biopsy    TURBT ([facility]ater[facility])    A  Urinary Bladder, Left Posterior Bladder Wall:  -Extensively- denuded urothelial lined mucosa with mild chronic inflammation, vascular congestion  And edema   -Unremarkable small fragment of detrusor muscle present  -No evidence of invasive urothelial carcinoma seen     B   Urinary Bladder, Left lateral bladder Wall:  -Partially-denuded urothelial lined mucosa with mild  chronic inflammation  -Unremarkable small fragment of detrusor muscle present  -No evidence of invasive urothelial carcinoma seen     C  Urinary Bladder, Right Posterior Bladder wall:  -Urothelial lined mucosa with mild  chronic inflammation Von Brunn nests and mild urothelial atypia, possibly due to previous BCG administration   -Unremarkable small fragment of detrusor muscle present  -No evidence of invasive urothelial carcinoma seen     D  Urinary Bladder, Right Lateral Bladder Wall:  - Urothelial lined mucosa with mild  chronic inflammation   -Muscularis propria/ detrusor muscle is not present for evaluation    -No evidence of invasive urothelial carcinoma seen  E  Prostate, Prostate Tissue:  -Urothelial lined mucosa with mild chronic inflammation, prominent Von Brunn nests and Cystitis cystica   -Unremarkable small fragment of detrusor muscle present   -No evidence of malignancy seen  10/18/2021 -  Cancer Staged    Staging form: Urinary Bladder, AJCC 8th Edition  - Clinical stage from 10/18/2021: Stage II (cT2, cN0, cM0) - Signed by Katie Dean MD on 11/17/2021  Stage prefix: Initial diagnosis       [facility]2021 [facility]    [facility]    A  Left posterior wall, bladder (transurethral resection):     - Urothelial tissue with small atypical cauterized focus favored to represent superficially invasive high-grade urothelial carcinoma  - Muscularis propria (detrusor muscle) present, and negative for carcinoma  - Marked edema and mucosal denudation with thermal artifact noted  B  Anterior wall, bladder (transurethral resection):      - Invasive high-grade urothelial carcinoma  - Carcinoma invades muscularis propria (detrusor muscle)       C   Left lateral wall, bladder (transurethral resection):     - Urothelial tissue with small atypical focus with marked thermal artifact; cannot exclude superficial carcinoma  - Muscularis propria (detrusor muscle) present, and negative for carcinoma        - Marked edema and mucosal denudation with thermal artifact noted     1/24/2022 - 3/26/2022 Chemotherapy    mitoMYcin (MUTAMYCIN), 12 mg/m2 = 28 7 mg (100 % of original dose 12 mg/m2), Intravenous, Once, 1 of 1 cycle  Dose modification: 12 mg/m2 (original dose 12 mg/m2, Cycle 1), 3 mg/m2 (original dose 12 mg/m2, Cycle 1)  Administration: 7 2 mg (1/24/2022)  fluorouracil (ADRUCIL) ambulatory infusion Soln, 500 mg/m2/day = 4,780 mg (50 % of original dose 1,000 mg/m2/day), Intravenous, Over 96 hours, 1 of 1 cycle, Start date: 1/18/2022, End date: 1/23/2022  Dose modification: 500 mg/m2/day (original dose 1,000 mg/m2/day, Cycle 1, Reason: Dose modified as per discussion with consulting physician)     1/24/2022 - 3/24/2022 Radiation    Pelvis:1 6X 21 / 21 275 0 5,775 59      Treatment dates:  C1: 1/24/2022 - 3/24/2022     7/15/2022 - 8/26/2022 Chemotherapy    pembrolizumab (KEYTRUDA) IVPB, 400 mg, Intravenous, Once, 2 of 6 cycles  Administration: 400 mg (7/15/2022), 400 mg (8/26/2022)     9/13/2022 -  Chemotherapy    enfortumab vedotin-ejfv (PADCEV) IVPB, 1 25 mg/kg, Intravenous, Once, 0 of 6 cycles       He had been getting BCG vaccine for his bladder since 1994 6/28/2022 PET/CT: read pending, per my assessment lung metastatic hypermetabolic nodules as well as L chest wall nodule    SUBJECTIVE  (INTERVAL HISTORY)      Clotting History None   Bleeding History No major events   Cancer History Bladder   Family Cancer History None   H/O Blood/Plt Transfusion None   Tobacco/etoh/drug abuse 1 5 PPD x 17 years (quit 1970), no other abuse   Hx COVID19 Infection and Vaccine Status Pfizer x 3 (had booster 9/2021)   Cancer Screening history n/a   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)   New medications in the last month: PO iron daily (recommended MWF)  Pain: dysuria (since 10/2021 bladder procedure), LBP, L foot pain s/p remote toe amputation     His maximum weight prior to the gastric bypass that he had was 330lbs  I have reviewed the relevant past medical, surgical, social and family history  I have also reviewed allergies and medications for this patient  Interval events:   Still having L sided chest pain, worsening dyspnea as well  Stable energy level, appetite, weight  Sleeping a lot during the day and not feeling great  Denies falls, gen weakness  Review of Systems  Denies unintentional weight loss, F/C, N/V, CP, diarrhea, constipation, rash, itching, melena, hematuria, hematochezia  Chronic mild SOB with exertion, intermittent anxiety and depression that has been worsening since his cancer diagnosis  He has had fatigue since 2/2021 (his discharge from the hospital for LLE toe amputation)  No new issues although he has been dealing with his chronic back pain over the past year  A 10-point review of system was performed, pertinent positive and negative were detailed as above  Otherwise, the 10-point review of system was negative        Past Medical History:   Diagnosis Date    Anemia     Last assessed: 9/28/17    Anxiety     Arteriosclerotic cardiovascular disease     Last assessed: 9/28/17    Arthritis     Bladder cancer ([facility] )     bladder- had BCG treatments    Chronic kidney disease     Stage IV    CKD (chronic kidney disease) stage 4, GFR 15-29 ml/min ([facility])     Colon polyp     Coronary artery disease     7 stents    Depression     Diabetes mellitus (HCC)     IDDM    GERD (gastroesophageal reflux disease)     Glaucoma     Hematuria     History of fusion of cervical spine     Hyperlipidemia     Hypertension     Insomnia     Last assessed: 11/14/12    Loss of hearing     has hearing aids but usually does not wear them    Other seasonal allergic rhinitis     Last assessed: 2/10/16    [facility]peripheral artery [facility] )  Shortness of breath     on exertion    Spinal stenosis of lumbar region     Transient cerebral ischemia     No Residual    Uses walker     w/c for longer distances       Past Surgical History:   Procedure Laterality Date    CARDIAC SURGERY      Cath stent placement  Last assessed: 3/9/17  Interventional Catheterization    CHOLECYSTECTOMY      COLONOSCOPY      CYSTOSCOPY      Diagnostic w/biopsy  Audrey Jetty  Last assessed: 12/1/14    CYSTOSCOPY N/A 4/12/2022    Procedure: CYSTOSCOPY  Bladder biopsies  ;  Surgeon: Gal Joseph MD;  [facility];  Service: Urology    CYSTOSCOPY W/ RETROGRADES Right 3/1/2022    Procedure: CYSTO; stent removal retrograde;  Surgeon: Gal Joseph MD;  [facility];  Service: Urology    CYSTOSCOPY W/ URETERAL STENT PLACEMENT Bilateral 10/18/2021    Procedure: bilateral retrogrades, cytology collection;  Surgeon: Gal Joseph MD;  [facility];  Service: Urology    CYSTOURETHROSCOPY      w/cautery  Audrey Jetty    FL RETROGRADE PYELOGRAM  10/18/2021    FL RETROGRADE PYELOGRAM  10/24/2021    GASTRIC BYPASS      For morbid obesity w/Shaji-en-Y   Resolved: 11/17/09    INCISION AND DRAINAGE OF WOUND Right 2/26/2017    Procedure: INCISION AND DRAINAGE (I&D) EXTREMITY WITH APPLICATION OF GRAFT JACKET;  Surgeon: Nola Velasco DPM;  [facility];  Service:     INCISION AND DRAINAGE OF WOUND Right 4/25/2017    Procedure: INCISION AND DRAINAGE (I&D) EXTREMITY, APPLICATION OF GRAFT;  Surgeon: Nola Velasco DPM;  [facility];  Service:     IR BIOPSY OTHER  7/2/2020    IR LOWER EXTREMITY ANGIOGRAM  2/8/2021    IR LOWER EXTREMITY ANGIOGRAM  2/11/2021    IR NEPHROSTOMY TUBE CHECK/CHANGE/REPOSITION/REINSERTION/UPSIZE  4/28/2022    IR NEPHROSTOMY TUBE CHECK/CHANGE/REPOSITION/REINSERTION/UPSIZE  5/24/2022    IR NEPHROSTOMY TUBE CHECK/CHANGE/REPOSITION/REINSERTION/UPSIZE  6/7/2022    IR NEPHROSTOMY TUBE CHECK/CHANGE/REPOSITION/REINSERTION/UPSIZE  7/28/2022    IR NEPHROSTOMY TUBE PLACEMENT  2/25/2022    IR PORT PLACEMENT  1/17/2022    IR TUNNELED CENTRAL LINE PLACEMENT  12/24/2020    JOINT REPLACEMENT      christofer knees replaced    TX AMPUTATION METATARSAL+TOE,SINGLE Left 12/21/2020    Procedure: RAY RESECTION FOOT;  Surgeon: Chas Ricks DPM;  [facility];  Service: Podiatry    TX AMPUTATION METATARSAL+TOE,SINGLE Left 12/31/2020    Procedure: 5TH MET RESECTION;  Surgeon: Chas Ricks DPM;  [facility];  Service: Podiatry    TX CYSTOURETHROSCOPY W/IRRIG & EVAC CLOTS N/A 2/10/2021    Procedure: CYSTOSCOPY EVACUATION OF CLOTS, fulguration;  Surgeon: Harsha Moreira MD;  [facility];  Service: Urology    TX CYSTOURETHROSCOPY W/IRRIG & EVAC CLOTS N/A 10/24/2021    Procedure: CYSTOSCOPY EVACUATION OF CLOT, fulguration of bleeding vessels, right ureter stent placement, retrograde pyelogram;  Surgeon: Jessa Alexander MD;  [facility];  Service: Urology    TX CYSTOURETHROSCOPY,BIOPSY N/A 8/16/2016    Procedure: CYSTOSCOPY WITH BIOPSIES;  Surgeon: Faustino Stoddard MD;  [facility];  Service: Urology    TX CYSTOURETHROSCOPY,FULGUR <0 5 CM LESN N/A 11/19/2020    Procedure: CYSTO W/BIOPSIES, transurethral prostate bx;  Surgeon: Philomena Bonner MD;  [facility];  Service: Urology    TX CYSTOURETHROSCOPY,FULGUR >5 CM LESN Bilateral 10/18/2021    Procedure: TRANSURETHRAL RESECTION OF BLADDER TUMOR (TURBT);   Surgeon: Wai Vazquez MD;  [facility];  Service: Urology    TX Francisca Rosenbaum 3RD+ ORD [address] PEL/LXTR [facility] Left 2/8/2021    Procedure: LEG angiogram, CO2 w/limited contrast with balloon angioplasty postertior tibial artery;  Surgeon: Santana Morin MD;  [facility];  Service: Vascular    ROTATOR CUFF REPAIR      SMALL INTESTINE SURGERY      Surgery Shaji-en-Y    SPINAL FUSION      lumbar and cervical fusions    VAC DRESSING APPLICATION Right 2017    Procedure: APPLICATION VAC DRESSING;  Surgeon: Ray Almanzar DPM;  [facility];  Service:    [address] [place] [address] Left 2021    Procedure: FOOT DEBRIDE, [address] OUT w/graft application;  Surgeon: Ray Almanzar DPM;  [facility];  Service: Podiatry       Family History   Problem Relation Age of Onset    Diabetes Mother     Heart disease Mother     Other Mother         High blood pressure    Heart disease Father     Diabetes Sister     Other Sister         High blood pressure    Kidney disease Sister     Heart disease Brother     Other Brother         High blood pressure       Social History     Socioeconomic History    Marital status: /Civil Union     Spouse name: Not on file    Number of children: Not on file    Years of education: Not on file    Highest education level: Not on file   Occupational History    Not on file   Tobacco Use    Smoking status: Former Smoker     Packs/day: 3 00     Years: 27 00     Pack years: 81 00     Types: Cigarettes     Quit date:      Years since quittin 7    Smokeless tobacco: Never Used   Vaping Use    Vaping Use: Never used   Substance and Sexual Activity    Alcohol use: Never     Comment: beer / liquor    Drug use: Not Currently     Types: Marijuana     Comment: quit 2019 had medical marijuana    Sexual activity: Not Currently   Other Topics Concern    Not on file   Social History Narrative    Consumes 1 cup of coffee and 1 soda per day     Social Determinants of Health     Financial Resource Strain: Not on file   Food Insecurity: Not on file   Transportation Needs: Not on file   Physical Activity: Not on file   Stress: Not on file   Social Connections: Not on file   Intimate Partner Violence: Not on file   Housing Stability: Not on file       Allergies   Allergen Reactions    Atorvastatin Hives, Itching and Rash    Simvastatin Rash and Edema     Edema of lower legs    Statins Hives and Itching    Insulin Lispro Swelling and Edema     " Lower Legs"    Other Itching, Rash and Other (See Comments)     "EKG Patches"   "blue EKG patches"       Current Outpatient Medications   Medication Sig Dispense Refill    Accu-Chek Softclix Lancets lancets Test blood sugar 4 times daily 200 each 0    acetaminophen (TYLENOL) 325 mg tablet Take 650 mg by mouth every 6 (six) hours as needed for mild pain        ALPRAZolam (XANAX) 0 25 mg tablet At twice a day as needed for anxiety 30 tablet 0    aspirin (ECOTRIN LOW STRENGTH) 81 mg EC tablet Take 1 tablet (81 mg total) by mouth daily      Azelastine HCl 0 15 % SOLN Inhale 1 spray 2 (two) times a day 30 mL 3    Bimatoprost (LUMIGAN OP) Apply 1 drop to eye daily at bedtime       calcitriol (ROCALTROL) 0 25 mcg capsule Take 1 capsule (0 25 mcg total) by mouth daily 90 capsule 0    docusate sodium (COLACE) 100 mg capsule TAKE ONE CAPSULE BY MOUTH TWICE A  capsule 0    DULoxetine (CYMBALTA) 30 mg delayed release capsule TAKE ONE CAPSULE BY MOUTH EVERY DAY 90 capsule 0    ezetimibe (ZETIA) 10 mg tablet TAKE ONE TABLET BY MOUTH ONCE DAILY IN THE EARLY MORNING 90 tablet 0    Ferrous Sulfate Dried (Feosol) 200 (65 Fe) MG TABS Take 65 mg by mouth daily 90 tablet 0    fluocinonide (LIDEX) 0 05 % cream Apply topically 2 (two) times a day (Patient taking differently: Apply topically as needed) 30 g 0    fluticasone (FLONASE) 50 mcg/act nasal spray 1 spray into each nostril daily (Patient taking differently: 1 spray into each nostril as needed) 11 mL 1    furosemide (LASIX) 20 mg tablet Take 1 tablet (20 mg total) by mouth daily 90 tablet 1    gabapentin (Neurontin) 300 mg capsule Take 1 capsule (300 mg total) by mouth daily at bedtime 90 capsule 0    glucose blood (Accu-Chek Martha Plus) test strip Test blood sugar 4 times daily 200 strip 0    Insulin Pen Needle 31G X 8 MM MISC Inject 3 times a day 100 each 2    isosorbide mononitrate (IMDUR) 30 mg 24 hr tablet Take 1 tablet (30 mg total) by mouth daily in the early morning 90 tablet 0    Lantus SoloStar 100 units/mL injection pen 18 units qhs (Patient taking differently: Inject 18 Units under the skin daily at bedtime) 30 mL 1    lidocaine (XYLOCAINE) 2 % topical gel Apply topically as needed for mild pain 30 mL 0    lidocaine-prilocaine (EMLA) cream Apply topically as needed for mild pain 30 g 0    loperamide (IMODIUM) 2 mg capsule Take 2 mg by mouth 4 (four) times a day as needed for diarrhea      metoprolol succinate (TOPROL-XL) 100 mg 24 hr tablet TAKE ONE TABLET BY MOUTH ONCE DAILY 90 tablet 0    multivitamin (THERAGRAN) TABS Take 1 tablet by mouth daily   naloxone (NARCAN) 4 mg/0 1 mL nasal spray Administer 1 spray into a nostril  If no response after 2-3 minutes, give another dose in the other nostril using a new spray   1 each 1    nitrofurantoin (MACROBID) 100 mg capsule Take 1 capsule (100 mg total) by mouth 2 (two) times a day 10 capsule 0    NovoLOG FlexPen 100 units/mL injection pen 10 units with each meal  (Patient taking differently: Inject 10 Units under the skin 3 (three) times a day with meals) 5 pen 1    omeprazole (PriLOSEC) 20 mg delayed release capsule Take 20 mg by mouth daily in the early morning PRN       ondansetron (Zofran ODT) 8 mg disintegrating tablet Take 1 tablet (8 mg total) by mouth every 8 (eight) hours as needed for nausea or vomiting 20 tablet 0    oxybutynin (DITROPAN-XL) 10 MG 24 hr tablet TAKE ONE TABLET BY MOUTH ONCE DAILY 30 tablet 0    oxyCODONE (OxyCONTIN) 10 mg 12 hr tablet Take 1 tablet (10 mg total) by mouth daily at bedtime Max Daily Amount: 10 mg 90 tablet 0    oxyCODONE (Roxicodone) 5 immediate release tablet Take 1 tablet (5 mg total) by mouth every 4 (four) hours as needed for moderate pain Max Daily Amount: 30 mg 60 tablet 0    phenazopyridine (PYRIDIUM) 200 mg tablet Take 1 tablet (200 mg total) by mouth 3 (three) times a day with meals 10 tablet 0    predniSONE 20 mg tablet Take 1 tablet (20 mg total) by mouth daily 5 tablet 0    senna (SENOKOT) 8 6 MG tablet Take 1 tablet (8 6 mg total) by mouth daily 30 tablet     sodium chloride, PF, 0 9 % 10 mL by Intracatheter route daily Bilateral PCNs to be flushed daily with prefilled 10cc  mL 3    ARIPiprazole (ABILIFY) 2 mg tablet Take 1 tablet (2 mg total) by mouth daily 30 tablet 0    sodium chloride, PF, 0 9 % 10 mL by Intracatheter route daily Intracatheter flushing daily 900 mL 0    sodium chloride, PF, 0 9 % 10 mL by Intracatheter route daily Intracatheter flushing daily 900 mL 0     No current facility-administered medications for this visit  (Not in a hospital admission)      Objective:     24 Hour Vitals Assessment:     Vitals:    09/02/22 0816   BP: 122/78   Pulse: 92   Resp: 18   Temp: (!) 96 9 °F (36 1 °C)   SpO2: 97%       PHYSICIAN EXAM:    General: Appearance: alert, cooperative, in visible distress from pain in his urethra  HEENT: Normocephalic, atraumatic  No scleral icterus  conjunctivae clear  EOMI  Chest: No tenderness to palpation  No open wound noted  Lungs: L sided diminished compared to the R, Respirations unlabored  Cardiac: Regular rate and rhythm, +S1and S2  Abdomen: Soft, non-tender, non-distended  Bowel sounds are normal    Extremities:  No edema, cyanosis, clubbing  Skin: Skin color, turgor are normal    Lymphatics: no palpable supra-cervical, axillary, or inguinal adenopathy  Neurologic: Awake, Alert, and oriented, no gross focal deficits noted b/l  Port c/d/i      DATA REVIEW:    Pathology Result:    Final Diagnosis   Date Value Ref Range Status   04/12/2022   Final    A  Urinary Bladder, selected bladder biopsies:  - Focally intact urothelial mucosa with associated acutely inflamed granulation tissue  See comment  B  Prostatic urethra:  - Focally intact urothelial mucosa with associated acutely inflamed granulation tissue  See comment      Comment: Immunohistochemistry for AE1/3 is negative for an infiltrative pattern  Clinical history notable for status post chemotherapy and urine culture positive for Serratia marcescens  The findings are compatible with infectious cystitis  10/18/2021   Final    A  Left posterior wall, bladder (transurethral resection):     - Urothelial tissue with small atypical cauterized focus favored to represent superficially invasive high-grade urothelial carcinoma  - Muscularis propria (detrusor muscle) present, and negative for carcinoma  - Marked edema and mucosal denudation with thermal artifact noted  B  Anterior wall, bladder (transurethral resection):      - Invasive high-grade urothelial carcinoma  - Carcinoma invades muscularis propria (detrusor muscle)  C   Left lateral wall, bladder (transurethral resection):     - Urothelial tissue with small atypical focus with marked thermal artifact; cannot exclude superficial carcinoma  - Muscularis propria (detrusor muscle) present, and negative for carcinoma  - Marked edema and mucosal denudation with thermal artifact noted  Comment: This is an appended report  These results have been appended to a previously preliminary verified report  10/18/2021   Final    A  Renal Washing, RIGHT RENAL PELVIS WASHING:  Suspicious for high grade urothelial carcinoma [facility]) - see comment  Comment:  The above diagnostic category is from the recently published book, [facility] [place] [facility] Urinary Cytology, and is in keeping with the ongoing effort for utilization of standardized diagnostic terminology in urine cytology  *    *[facility] [place] [facility] Urinary Cytology  Renetta Castillo; 2016  B  Renal Washing, LEFT RENAL PELVIS WASHING:  Atypical urothelial cells (AUC) - see comment      Comment:  The above diagnostic category is from the recently published book, [facility] [place] [facility] Urinary Cytology, and is in keeping with the ongoing effort for utilization of standardized diagnostic terminology in urine cytology  *    *[facility] [place] [facility] Urinary Cytology  Renetta L  Raymundo Jin; 2016 09/03/2021   Final    A  Urine, Voided, :  Atypical urothelial cells (AUC) - see comment  Red blood cells     Satisfactory for evaluation  Comment:    - Few atypical urothelial cells noted in this patient with a 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  A high grade urothelial carcinoma cannot be excluded on this material  Suggest clinical correlation and follow-up as indicated  - The above diagnostic category is from the recently published book, [facility] [place] [facility] Urinary Cytology, and is in keeping with the ongoing effort for utilization of standardized diagnostic terminology in urine cytology  *    *[facility] [place] [facility] Urinary Cytology  Renettaisadora Truongaubrey Jin; 2016 '          12/31/2020   Final    A  Left 5th metatarsal bone:  - Acute osteomyelitis in benign metatarsal bone  12/21/2020   Final    A  Bone, left 5th metatarsal, amputation:       - Acute osteomyelitis  - No dysplasia or malignancy is identified  B  Bone, 5th metatarsal clean margin, excision:       - Focal acute osteomyelitis  - Large caliber blood vessels with luminal calcifications and greater than 75% occlusion        - No dysplasia or malignancy is identified  Interpretation performed at [address] [address]       11/19/2020   Final    A  Urinary Bladder, Left Posterior Bladder Wall:  -Extensively- denuded urothelial lined mucosa with mild chronic inflammation, vascular congestion  And edema   -Unremarkable small fragment of detrusor muscle present  -No evidence of invasive urothelial carcinoma seen    B   Urinary Bladder, Left lateral bladder Wall:  -Partially-denuded urothelial lined mucosa with mild  chronic inflammation  -Unremarkable small fragment of detrusor muscle present  -No evidence of invasive urothelial carcinoma seen    C  Urinary Bladder, Right Posterior Bladder wall:  -Urothelial lined mucosa with mild  chronic inflammation Von Brunn nests and mild urothelial atypia, possibly due to previous BCG administration   -Unremarkable small fragment of detrusor muscle present  -No evidence of invasive urothelial carcinoma seen    D  Urinary Bladder, Right Lateral Bladder Wall:  - Urothelial lined mucosa with mild  chronic inflammation   -Muscularis propria/ detrusor muscle is not present for evaluation    -No evidence of invasive urothelial carcinoma seen  E  Prostate, Prostate Tisssue:  -Urothelial lined mucosa with mild chronic inflammation, prominent Von Brunn nests and Cystitis cystica   -Unremarkable small fragment of detrusor muscle present   -No evidence of malignancy seen  08/17/2020   Final    A  Urine, Other, :  Negative for high grade urothelial carcinoma ([address]) - see comment  Urothelial cells, squamous cells, red blood cells and neutrophils  Satisfactory for evaluation  Comment:  The above diagnostic category is from the recently published book, [facility] [place] [facility] Urinary Cytology, and is in keeping with the ongoing effort for utilization of standardized diagnostic terminology in urine cytology  *    *[facility] [place] [facility] Urinary Cytology  Renetta Grande; 2016 08/16/2016   Final    A   Bladder, biopsy:  -  High-grade urothelial carcinoma with flat and focal papillary architecture (see note)  07/18/2016   Final    A  Urine, clean catch (ThinPrep): Atypical urothelial cells (AUC) - see comment  Atypical single urothelial cells, benign squamous cells, red blood cells and neutrophils    Background of degenerative changes noted  Satisfactory for evaluation  Comment:  The above diagnostic category is from the recently published book, [facility] [place] [facility] Urinary Cytology, and is in keeping with the ongoing effort for utilization of standardized diagnostic terminology in urine cytology  *    *[facility] [place] [facility]inary Cyt[facility]  Dorothy L Sheryle Pelton Jeane Voss; 2016 01/18/2016   Final    A  Urine, Unspecified Source, :  Rare cluster of degenerated appearing urothelial cells present; see note  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  Few red blood cells also identified  Satisfactory for evaluation  Image Results:   Image result are reviewed and documented in Hematology/Oncology history    NM bone scan whole body  Narrative: BONE SCAN  WHOLE BODY    INDICATION: [id number]: Pleurodynia , left-sided rib and chest pain without history of trauma  History of bladder cancer  PREVIOUS FILM CORRELATION:    Comparison is made to the skeletal structures of CT of chest, abdomen, and pelvis dated 8/26/2022 and PET/CT dated 6/28/2022    TECHNIQUE:   This study was performed following the intravenous administration of 26 4 mCi Tc-99m labeled MDP  Delayed, anterior and posterior whole body images were acquired, 2-3 hours after radiopharmaceutical administration  FINDINGS:     There is no focal tracer activity characteristic of osseous metastatic disease  Please note that lytic lesions have variable tracer activity on bone scan and the lack of focal tracer activity does not preclude the possibility of an underlying osteolytic   aggressive/malignant process  There is no focal tracer activity involving the ribs to suggest acute fracture  There is nonspecific multifocal tracer activity in a pattern most consistent with degenerative changes involving the spine, bilateral knees, and bilateral feet    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  Activity within bilateral nephrostomy bags are noted about the lateral aspects of the bilateral knees  The kidneys are only faintly visualized  Impression: 1  No scintigraphic evidence of osseous metastasis  2   No focal tracer activity involving the ribs to suggest acute fracture  3   The etiology of the patient's clinical symptomatology is not identified on bone scan  Workstation performed: [id number]      LABS:  Lab data are reviewed and documented in HemOnc history  Lab Results   Component Value Date    HGB 10 3 (L) 08/23/2022    HCT 32 8 (L) 08/23/2022    MCV 93 08/23/2022     08/23/2022    WBC 9 92 08/23/2022    NRBC 0 08/23/2022    BANDSPCT 3 02/04/2022    ATYLMPCT 1 (H) 02/04/2022     Lab Results   Component Value Date     09/03/2015    K 5 8 (H) 08/25/2022     08/25/2022    CO2 28 08/25/2022    ANIONGAP 5 09/03/2015    BUN 54 (H) 08/25/2022    CREATININE 3 48 (H) 08/25/2022    GLUCOSE 203 (H) 09/03/2015    GLUF 84 08/25/2022    CALCIUM 9 7 08/25/2022    CORRECTEDCA 10 6 (H) 08/25/2022    AST 29 08/25/2022    ALT 41 08/25/2022    ALKPHOS 134 (H) 08/25/2022    PROT 6 4 07/26/2015    BILITOT 0 50 07/26/2015    EGFR 15 08/25/2022       Lab Results   Component Value Date    IRON 186 (H) 12/10/2021    TIBC 377 12/10/2021    FERRITIN 23 12/10/2021    FERRITIN 40 10/13/2021    FERRITIN 31 06/22/2021    FERRITIN 23 02/17/2021    FERRITIN 52 12/19/2020    FERRITIN 73 09/19/2018       No results found for: [id number]    No results for input(s): WBC, CREAT in the last 72 hours      Invalid input(s):  PLT    By:  Swati Goldberg MD, 9/2/2022, 8:56 AM

## 2022-08-26 NOTE — TELEPHONE ENCOUNTER
Call out to patient per Dr Lena Blount recommendations  Reviewed that Dr Lena Blount received patient ct scan results due to a large L sided pleural effusion  Patient did state was having some shortness of breath  Reviewed that Dr Lena Blount had an urgent Thoracentsis ordered and it was scheduled for 8/31 at [facility] for 1130am      Reviewed if patient shorness of breath increases or worsens to call [address] number and or go to ED for further evaluation  Reviewed also that Dr Lena Blount reached out to patient nephrologist and patient's potassium will be monitored by the nephrologist      Patient verbalized that nephrologist reached out to patient as well  Patient appreciative of call and agreeable to plan

## 2022-08-29 ENCOUNTER — OFFICE VISIT (OUTPATIENT)
Dept: PALLIATIVE MEDICINE | Facility: CLINIC | Age: 77
End: 2022-08-29
Payer: MEDICARE

## 2022-08-29 VITALS
HEART RATE: 86 BPM | TEMPERATURE: 96.6 F | SYSTOLIC BLOOD PRESSURE: 132 MMHG | OXYGEN SATURATION: 97 % | WEIGHT: 222.66 LBS | BODY MASS INDEX: 30.16 KG/M2 | DIASTOLIC BLOOD PRESSURE: 82 MMHG | HEIGHT: 72 IN

## 2022-08-29 DIAGNOSIS — C67.9 BLADDER CARCINOMA (HCC): Primary | ICD-10-CM

## 2022-08-29 DIAGNOSIS — C67.3 MALIGNANT NEOPLASM OF ANTERIOR WALL OF URINARY BLADDER (HCC): ICD-10-CM

## 2022-08-29 DIAGNOSIS — G89.3 CANCER RELATED PAIN: ICD-10-CM

## 2022-08-29 DIAGNOSIS — F32.A DEPRESSION: ICD-10-CM

## 2022-08-29 DIAGNOSIS — F11.20 CONTINUOUS OPIOID DEPENDENCE (HCC): ICD-10-CM

## 2022-08-29 PROCEDURE — 99214 OFFICE O/P EST MOD 30 MIN: CPT | Performed by: NURSE PRACTITIONER

## 2022-08-29 RX ORDER — OXYCODONE HCL 10 MG/1
10 TABLET, FILM COATED, EXTENDED RELEASE ORAL
Qty: 90 TABLET | Refills: 0 | Status: SHIPPED | OUTPATIENT
Start: 2022-08-29 | End: 2022-09-26

## 2022-08-29 RX ORDER — SENNA PLUS 8.6 MG/1
1 TABLET ORAL DAILY
Qty: 30 TABLET
Start: 2022-08-29 | End: 2022-09-26

## 2022-08-29 RX ORDER — OXYCODONE HYDROCHLORIDE 5 MG/1
5 TABLET ORAL EVERY 4 HOURS PRN
Qty: 60 TABLET | Refills: 0 | Status: SHIPPED | OUTPATIENT
Start: 2022-08-29

## 2022-08-29 NOTE — PROGRESS NOTES
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)    Relevant Medications    oxyCODONE (OxyCONTIN) 10 mg 12 hr tablet       Other    Continuous opioid dependence (HCC)    Relevant Medications    oxyCODONE (OxyCONTIN) 10 mg 12 hr tablet    Cancer related pain    Relevant Medications    oxyCODONE (OxyCONTIN) 10 mg 12 hr tablet    oxyCODONE (Roxicodone) 5 immediate release tablet    senna (SENOKOT) 8 6 MG tablet      Other Visit Diagnoses     Depression        Relevant Medications    oxyCODONE (Roxicodone) 5 immediate release tablet          Medications adjusted this encounter:  Requested Prescriptions     Signed Prescriptions Disp Refills    oxyCODONE (OxyCONTIN) 10 mg 12 hr tablet 90 tablet 0     Sig: Take 1 tablet (10 mg total) by mouth daily at bedtime Max Daily Amount: 10 mg    oxyCODONE (Roxicodone) 5 immediate release tablet 60 tablet 0     Sig: Take 1 tablet (5 mg total) by mouth every 4 (four) hours as needed for moderate pain Max Daily Amount: 30 mg    senna (SENOKOT) 8 6 MG tablet 30 tablet      Sig: Take 1 tablet (8 6 mg total) by mouth daily     No orders of the defined types were placed in this encounter  Medications Discontinued During This Encounter   Medication Reason    oxyCODONE (OxyCONTIN) 10 mg 12 hr tablet Reorder    oxyCODONE (Roxicodone) 5 immediate release tablet Reorder    senna (SENOKOT) 8 6 MG tablet Reorder         Ed Steele was seen today for symptoms and planning cares related to above illnesses  I have reviewed the patient's controlled substance dispensing history in the Prescription Drug Monitoring Program in compliance with the [facility] regulations before prescribing any controlled substances  They are invited to continue to follow with us    If there are questions or concerns, please contact us through our clinic/answering service 24 hours a day, seven days a week  April EVANGELINA Miller  [facility] Palliative and Supportive [facility]  [phone]      Visit Information    Accompanied By: Spouse    Source of History: Self    History Limitations: None      History of Present Illness      Francisco Harden is a 76 y o  male who presents in follow up of symptoms related to metastatic bladder cancer  Pertinent issues include: symptom management, pain, neoplasm related, depression or anxiety, nausea, fatigue, advance care planning    Per last visit note:   During last admission, Increase Cymbalta from 20-30 mg, discontinue sertraline and add Abilify 2 mg at bedtime by psychiatry  He did feel the Abilify was helpful however he ran out  Abilify refilled  He is taking xanax 0 25 BID as needed he is taking it in the morning and at night and finds that helpful for anxiety  For pain he takes oxycontin 10 mg Q 8 hours  He had taken oxycodone 5 mg Q 4 hours for breakthrough pain however he has not had to use it  At this time he is only taking oxycodone at night before bed  He has significant pain at night related to laying down with his nephrostomy tubes  Which has led to significant difficulty sleeping  He states he takes oxycodone 5 mg without relief  Will increase to 7 5 mg for severe pain  The patient states that he will be starting dialysis in the next couple weeks  The patient has been experiencing increasing shortness of breath and pleuritic chest pain  Imaging was completed which revealed a large R pleural effusion and disease progression  Most recent labs revealed worsening renal function with hyperkalemia  The patient is being scheduled for a thoracentesis int he next couple of days  He reports increased pleuritic pain which he states has been increasing  Oxycodone is effective for pain    The patient verbalized concern about using oxycodone with renal impairment  Reviewed use of oxycodone and opioids that should not be used with significant renal impairment  The patient states he is no longer taking oxycontin 3 times a day but has been taking only at night  Changed order to reflect patient use  He states that he is able to sleep comfortably at night when he takes the Oxycontin  He has improved pain with oxycodone 5 mg when taken as needed through the day  He states he takes oxycodone 5 mg approximately twice a day  His appetite has been poor but he correlates his recent loss of appetite with increased pain and feeling full  Will reassess post thoracentesis to see if pain is improved  They were advised to reach out to the office if appetite does not improve  The patient has follow up with Dr Seble Villeda on Friday  Past medical, surgical, social, and family histories are reviewed and pertinent updates are made  Review of Systems   Constitutional: Positive for appetite change and fatigue  All other systems reviewed and are negative  CT chest abdomen pelvis wo contrast    Result Date: 8/26/2022  Narrative CT CHEST, ABDOMEN AND PELVIS WITHOUT IV CONTRAST INDICATION:   C67 3: Malignant neoplasm of anterior wall of bladder  COMPARISON:  Prior CT study dated 6/3/2022  TECHNIQUE: CT examination of the chest, abdomen and pelvis was performed without intravenous contrast  Axial, sagittal, and coronal 2D reformatted images were created from the source data and submitted for interpretation  Radiation dose length product (DLP) for this visit:  716 mGy-cm   This examination, like all CT scans performed in the [facility], was performed utilizing techniques to minimize radiation dose exposure, including the use of iterative reconstruction and automated exposure control  Enteric contrast was not administered  FINDINGS: CHEST LUNGS:  There is patchy lingular and left lower lobe consolidation likely passive atelectasis  Superimposed pneumonia cannot be excluded  There is a lobulated 1 cm nodule in the left upper lobe on series 3, image 85 not seen on the prior study  There is a 7 mm nodule in the right middle lobe, increased in size since prior study with surrounding groundglass density, nonspecific  Stable 2 mm nodule right middle lobe series 3, image 85  Previously noted nodule adjacent to the major fissure in the lingula is obscured by patchy airspace consolidation  No obvious endobronchial lesion  PLEURA:  There is now a moderately large left pleural effusion, not seen on the prior study  No right-sided pleural effusion or pneumothorax  HEART/GREAT VESSELS: Heart is unremarkable for patient's age  No thoracic aortic aneurysm  Extensive coronary artery calcifications  Trace anterior pericardial effusion  A right chest wall sonja catheter visualized with a line at the cavoatrial junction  MEDIASTINUM AND DIMAS:  No significant mediastinal or obvious hilar lymphadenopathy  No axillary lymphadenopathy  CHEST WALL AND LOWER NECK:  Right chest wall sonja catheter  Mild bilateral gynecomastia  No significant thyroid gland enlargement  ABDOMEN LIVER/BILIARY TREE:  Unremarkable  GALLBLADDER:  Status post cholecystectomy  SPLEEN:  Unremarkable  PANCREAS:  Unremarkable  ADRENAL GLANDS:  Unremarkable  KIDNEYS/URETERS:  Bilateral percutaneous nephrostomy stents  Bilateral renal cysts  No hydronephrosis  There is stable mild left-sided hydroureter with periureteral stranding  STOMACH AND BOWEL:  Status post gastric bypass surgery  No bowel obstruction  Colonic diverticulosis without acute diverticulitis  Mild rectal wall thickening may indicate a nonspecific proctitis  APPENDIX:  No findings to suggest appendicitis  ABDOMINOPELVIC CAVITY:  No ascites  No pneumoperitoneum  There is now bulky retroperitoneal lymphadenopathy, not seen on the prior study    The largest lymph node measures approximately 3 2 x 2 7 cm and is located to the left of the aorta concerning for  metastatic disease  VESSELS:  The abdominal aorta is calcified  PELVIS REPRODUCTIVE ORGANS:  The prostate is enlarged  URINARY BLADDER:  There is eccentric bladder wall thickening seen anteriorly into the left concerning for fabien bladder neoplasm  ABDOMINAL WALL/INGUINAL REGIONS:  Stable scarring in the bilateral anterior abdominal wall  OSSEOUS STRUCTURES: Stable stranding in the bilateral anterior abdominal wall may be related to scarring  Chronic compression fracture deformity of the posterior superior endplate of L5 versus a prominent Schmorl's node  This is unchanged in appearance  Cervical spinal fixation hardware partially seen  Degenerative changes noted in the thoracolumbar spine  No destructive lesion  Impression New moderately large left pleural effusion  Left lower lobe and lingular consolidation may reflect passive atelectasis but underlying pneumonia not excluded  Pulmonary nodules as described above, one of which appears new and the other appears increased in size  The nodule that has increased in size in the right middle lobe now demonstrates surrounding groundglass density, nonspecific  This is suspicious for  worsening metastatic disease  New retroperitoneal bulky lymphadenopathy compatible with metastatic disease  Mild rectal wall thickening concerning for proctitis  Bladder wall thickening concentrically likely compatible with known bladder neoplasm  Additional findings as above  Workstation performed: [id number]     CT chest abdomen pelvis wo contrast    Result Date: 6/8/2022  Narrative CT CHEST, ABDOMEN AND PELVIS WITHOUT IV CONTRAST INDICATION:   C67 3: Malignant neoplasm of anterior wall of bladder   COMPARISON:  Chest CT 4/24/2022, abdomen pelvic CT 4/7/2022 TECHNIQUE: CT examination of the chest, abdomen and pelvis was performed without intravenous contrast  This examination was performed without intravenous contrast in the context of the critical nationwide Omnipaque shortage  Lack of IV contrast limits the sensitivity for pathology within the visualized solid organs  Lack of oral contrast limits the sensitivity for pathology within the bowel  Axial, sagittal, and coronal 2D reformatted images were created from the source data and submitted for interpretation  Radiation dose length product (DLP) for this visit:  568 mGy-cm   This examination, like all CT scans performed in the [facility], was performed utilizing techniques to minimize radiation dose exposure, including the use of iterative reconstruction and automated exposure control  Enteric contrast was not administered  FINDINGS: CHEST LUNGS:  There is a 6 mm right middle lobe nodule (3/73)  Previously described 3 mm right middle lobe nodule is stable (3/80)  There is a new 8 mm nodule in the lingula (3/108)  There is a new 3 to 4 mm subpleural left lower lobe nodule (3/70)  There is no tracheal or endobronchial lesion  PLEURA:  Unremarkable  HEART/GREAT VESSELS: Heavy coronary artery calcification  No thoracic aortic aneurysm  MEDIASTINUM AND DIMAS:  Unremarkable  CHEST WALL AND LOWER NECK:  Implanted port right anterior chest wall  ABDOMEN LIVER/BILIARY TREE:  Unremarkable  GALLBLADDER:  Gallbladder is surgically absent  SPLEEN:  Unremarkable  PANCREAS:  Unremarkable  ADRENAL GLANDS:  Unremarkable  KIDNEYS/URETERS:  Bilateral nephrostomy tubes are identified without hydronephrosis  Bilateral renal cysts  STOMACH AND BOWEL:  Shaji-en-Y gastric bypass surgery  Colonic diverticulosis  APPENDIX:  No findings to suggest appendicitis  ABDOMINOPELVIC CAVITY:  Redemonstration of nonspecific retroperitoneal lymph nodes  No ascites  No pneumoperitoneum  VESSELS:  Atherosclerotic changes are present  No evidence of aneurysm  PELVIS REPRODUCTIVE ORGANS:  The prostate is enlarged  URINARY BLADDER:  The bladder is not well-distended    There is abnormal diffuse wall thickening measuring 2 2 cm which is increased since prior when it was 1 7 cm  The appearance is nonspecific and may represent cystitis/ known cancer  Post treatment changes can also be considered  ABDOMINAL WALL/INGUINAL REGIONS:  Unremarkable  OSSEOUS STRUCTURES:  No acute fracture or destructive osseous lesion  Spinal degenerative changes are noted  Impression 1  Development of a few pulmonary nodules, the largest measuring 8 mm  A PET CT would be recommended  2   Worsening wall thickening of the urinary bladder which may represent post treatment changes  Cannot exclude cystitis or progression of cancer  The study was marked in EPIC for significant notification  Workstation performed: [id number]     CT chest abdomen pelvis wo contrast    Result Date: 11/15/2021  Narrative CT CHEST, ABDOMEN AND PELVIS WITHOUT IV CONTRAST INDICATION:   C67 3: Malignant neoplasm of anterior wall of bladder  COMPARISON:  Prior studies, most recent CT scan is dated June 29, 2020  TECHNIQUE: CT examination of the chest, abdomen and pelvis was performed without intravenous contrast   Axial, sagittal, and coronal 2D reformatted images were created from the source data and submitted for interpretation  Radiation dose length product (DLP) for this visit:  647 mGy-cm   This examination, like all CT scans performed in the [facility], was performed utilizing techniques to minimize radiation dose exposure, including the use of iterative reconstruction and automated exposure control  Enteric contrast was administered  FINDINGS: CHEST LUNGS:  Lungs are clear  There is no tracheal or endobronchial lesion  PLEURA:  Unremarkable  HEART/GREAT VESSELS:  Extensive coronary artery calcification is noted  Heart is otherwise unremarkable  No thoracic aortic aneurysm  MEDIASTINUM AND DIMAS:  Unremarkable  CHEST WALL AND LOWER NECK:   Unremarkable  ABDOMEN LIVER/BILIARY TREE:  Unremarkable   GALLBLADDER:  Gallbladder is surgically absent  SPLEEN:  Unremarkable  PANCREAS: Unremarkable r ADRENAL GLANDS:  Unremarkable  KIDNEYS/URETERS:  Bilateral renal cysts  Right-sided ureteral stent in place  No hydronephrosis  STOMACH AND BOWEL:  Changes compatible with Shaji-en-Y gastric bypass  Multiple colonic diverticula  Small bowel normal  APPENDIX:  No findings to suggest appendicitis  ABDOMINOPELVIC CAVITY:  No ascites  No pneumoperitoneum  No lymphadenopathy  VESSELS:  Atherosclerotic changes are present  No evidence of aneurysm  PELVIS REPRODUCTIVE ORGANS:  Prostatomegaly, with the prostate gland measuring 6 9 cm (2, 121) previously 7 cm when measured in a similar fashion, not significantly changed  URINARY BLADDER:  Diffuse smooth bladder wall thickening involving the left side of the bladder  Previously demonstrated perivesicular fat stranding has resolved  ABDOMINAL WALL/INGUINAL REGIONS:  Unremarkable  OSSEOUS STRUCTURES:  No acute fracture or destructive osseous lesion  Impression 1  No metastatic disease within the limitations of noncontrast technique in the chest abdomen or pelvis  2   Diffuse hemicircumferential smooth bladder wall thickening on the left, similar to the prior study when allowing for differences in bladder distention  3   Prostatomegaly, not significantly changed  Workstation performed: [id number]   Vital Signs    /82 (BP Location: Right arm, Patient Position: Sitting, Cuff Size: Standard)   Pulse 86   Temp (!) 96 6 °F (35 9 °C) (Temporal)   Ht 6' (1 829 m)   Wt 101 kg (222 lb 10 6 oz)   SpO2 97%   BMI 30 20 kg/m²     Physical Exam and Objective Data  Physical Exam  Vitals and nursing note reviewed  Constitutional:       General: He is awake  Appearance: He is well-developed  HENT:      Head: Normocephalic and atraumatic  Jaw: There is normal jaw occlusion  Mouth/Throat:      Lips: Pink        Mouth: Mucous membranes are moist    Eyes:      Conjunctiva/sclera: Conjunctivae normal    Cardiovascular:      Rate and Rhythm: Normal rate and regular rhythm  Heart sounds: No murmur heard  Pulmonary:      Effort: Pulmonary effort is normal  No respiratory distress or retractions  Breath sounds: Normal breath sounds  Abdominal:      Palpations: Abdomen is soft  Tenderness: There is no abdominal tenderness  Musculoskeletal:      Cervical back: Neck supple  Comments: Generally weak  Skin:     General: Skin is warm and dry  Neurological:      General: No focal deficit present  Mental Status: He is alert and oriented to person, place, and time  GCS: GCS eye subscore is 4  GCS verbal subscore is 5  GCS motor subscore is 6  Psychiatric:         Attention and Perception: Attention normal          Mood and Affect: Mood normal          Speech: Speech normal          Behavior: Behavior is cooperative  Cognition and Memory: Cognition and memory normal          35+  minutes was spent face to face with his wife with greater than 50% of the time spent in counseling or coordination of care including discussions of risks and benefits of treatment, patient and family counseling/involvement in care and support in serious illness   All of the patient's or agent's questions were answered during this discussion

## 2022-08-30 ENCOUNTER — HOSPITAL ENCOUNTER (OUTPATIENT)
Dept: RADIOLOGY | Facility: HOSPITAL | Age: 77
Discharge: HOME | End: 2022-08-30
Attending: INTERNAL MEDICINE
Payer: MEDICARE

## 2022-08-30 DIAGNOSIS — R07.81 RIB PAIN ON LEFT SIDE: ICD-10-CM

## 2022-08-30 PROCEDURE — A9503 TC99M MEDRONATE: HCPCS

## 2022-08-30 PROCEDURE — 78306 BONE IMAGING WHOLE BODY: CPT

## 2022-08-30 PROCEDURE — G1004 CDSM NDSC: HCPCS

## 2022-08-31 ENCOUNTER — TELEPHONE (OUTPATIENT)
Dept: HEMATOLOGY ONCOLOGY | Facility: CLINIC | Age: 77
End: 2022-08-31

## 2022-08-31 NOTE — TELEPHONE ENCOUNTER
Call to patient granddaEmi muro Able in regards to patient having thoracentesis ordered for today at [facility]  Left message for patient granddaughter and also patient phone with call back number and hours of operation to further discuss and possibly reschedule thoracentesis

## 2022-08-31 NOTE — TELEPHONE ENCOUNTER
Reviewed with patient that patient has an appointment with Dr Eduardo Talbert on 9/2/22 at [facility] office  Reviewed I will meet with him to go over appointment for thoracentesis that same day  Patient appreciative of call  Reviewed if patient sob increases to go to ED for further evaluation

## 2022-08-31 NOTE — TELEPHONE ENCOUNTER
Call out to patient in regards to missed thoracentesis appointment scheduled for today  Reviewed a call was made last week to review date and time which patient was agreeable  Message sent to front staff to assist with rescheduling  Reviewed that IR staff will call patient in regards to thoracentesis for Friday  Reviewed if patient sob were to increase or worsen, to go to ED for further evaluation  Patient agreeable to plan and appreciative of call

## 2022-09-02 ENCOUNTER — TELEPHONE (OUTPATIENT)
Dept: HEMATOLOGY ONCOLOGY | Facility: CLINIC | Age: 77
End: 2022-09-02

## 2022-09-02 ENCOUNTER — OFFICE VISIT (OUTPATIENT)
Dept: HEMATOLOGY ONCOLOGY | Facility: CLINIC | Age: 77
End: 2022-09-02
Payer: MEDICARE

## 2022-09-02 ENCOUNTER — HOSPITAL ENCOUNTER (OUTPATIENT)
Dept: RADIOLOGY | Facility: HOSPITAL | Age: 77
Discharge: HOME | End: 2022-09-02
Attending: INTERNAL MEDICINE
Payer: MEDICARE

## 2022-09-02 VITALS
SYSTOLIC BLOOD PRESSURE: 122 MMHG | HEART RATE: 92 BPM | BODY MASS INDEX: 30.07 KG/M2 | RESPIRATION RATE: 18 BRPM | DIASTOLIC BLOOD PRESSURE: 78 MMHG | HEIGHT: 72 IN | TEMPERATURE: 96.9 F | WEIGHT: 222 LBS | OXYGEN SATURATION: 97 %

## 2022-09-02 VITALS
SYSTOLIC BLOOD PRESSURE: 146 MMHG | HEART RATE: 78 BPM | RESPIRATION RATE: 16 BRPM | OXYGEN SATURATION: 96 % | DIASTOLIC BLOOD PRESSURE: 83 MMHG

## 2022-09-02 DIAGNOSIS — I25.118 CORONARY ARTERY DISEASE OF NATIVE ARTERY OF NATIVE HEART WITH STABLE ANGINA PECTORIS (HCC): ICD-10-CM

## 2022-09-02 DIAGNOSIS — R11.2 CHEMOTHERAPY INDUCED NAUSEA AND VOMITING: ICD-10-CM

## 2022-09-02 DIAGNOSIS — M25.50 ARTHRALGIA, UNSPECIFIED JOINT: ICD-10-CM

## 2022-09-02 DIAGNOSIS — C67.3 MALIGNANT NEOPLASM OF ANTERIOR WALL OF URINARY BLADDER (HCC): Primary | ICD-10-CM

## 2022-09-02 DIAGNOSIS — J90 LARGE PLEURAL EFFUSION: ICD-10-CM

## 2022-09-02 DIAGNOSIS — T45.1X5A CHEMOTHERAPY INDUCED NAUSEA AND VOMITING: ICD-10-CM

## 2022-09-02 LAB
APPEARANCE FLD: NORMAL
BASOPHILS NFR FLD MANUAL: 1 %
COLOR FLD: NORMAL
EOSINOPHIL NFR FLD MANUAL: 1 %
GLUCOSE FLD-MCNC: 121 MG/DL
HISTIOCYTES NFR FLD: 6 %
LDH FLD L TO P-CCNC: 155 U/L
LYMPHOCYTES NFR BLD AUTO: 50 %
MONOCYTES NFR BLD AUTO: 18 %
NEUTS SEG NFR BLD AUTO: 24 %
PH BODY FLUID: 7.7
PROT FLD-MCNC: 5.5 G/DL
SITE: NORMAL
TOTAL CELLS COUNTED SPEC: 100
WBC # FLD MANUAL: 1014 /UL

## 2022-09-02 PROCEDURE — 82945 GLUCOSE OTHER FLUID: CPT | Performed by: INTERNAL MEDICINE

## 2022-09-02 PROCEDURE — 89051 BODY FLUID CELL COUNT: CPT | Performed by: INTERNAL MEDICINE

## 2022-09-02 PROCEDURE — 83615 LACTATE (LD) (LDH) ENZYME: CPT | Performed by: INTERNAL MEDICINE

## 2022-09-02 PROCEDURE — 87070 CULTURE OTHR SPECIMN AEROBIC: CPT | Performed by: INTERNAL MEDICINE

## 2022-09-02 PROCEDURE — 32555 ASPIRATE PLEURA W/ IMAGING: CPT | Performed by: RADIOLOGY

## 2022-09-02 PROCEDURE — 32555 ASPIRATE PLEURA W/ IMAGING: CPT

## 2022-09-02 PROCEDURE — 88112 CYTOPATH CELL ENHANCE TECH: CPT | Performed by: PATHOLOGY

## 2022-09-02 PROCEDURE — 84157 ASSAY OF PROTEIN OTHER: CPT | Performed by: INTERNAL MEDICINE

## 2022-09-02 PROCEDURE — 88305 TISSUE EXAM BY PATHOLOGIST: CPT | Performed by: PATHOLOGY

## 2022-09-02 PROCEDURE — 87205 SMEAR GRAM STAIN: CPT | Performed by: INTERNAL MEDICINE

## 2022-09-02 PROCEDURE — 88342 IMHCHEM/IMCYTCHM 1ST ANTB: CPT | Performed by: PATHOLOGY

## 2022-09-02 PROCEDURE — 88341 IMHCHEM/IMCYTCHM EA ADD ANTB: CPT | Performed by: PATHOLOGY

## 2022-09-02 PROCEDURE — 99214 OFFICE O/P EST MOD 30 MIN: CPT | Performed by: INTERNAL MEDICINE

## 2022-09-02 PROCEDURE — 83986 ASSAY PH BODY FLUID NOS: CPT | Performed by: INTERNAL MEDICINE

## 2022-09-02 RX ORDER — LIDOCAINE HYDROCHLORIDE 10 MG/ML
INJECTION, SOLUTION EPIDURAL; INFILTRATION; INTRACAUDAL; PERINEURAL CODE/TRAUMA/SEDATION MEDICATION
Status: COMPLETED | OUTPATIENT
Start: 2022-09-02 | End: 2022-09-02

## 2022-09-02 RX ORDER — PREDNISONE 20 MG/1
20 TABLET ORAL DAILY
Qty: 5 TABLET | Refills: 0 | Status: SHIPPED | OUTPATIENT
Start: 2022-09-02

## 2022-09-02 RX ORDER — ISOSORBIDE MONONITRATE 30 MG/1
30 TABLET, EXTENDED RELEASE ORAL
Qty: 90 TABLET | Refills: 0 | Status: SHIPPED | OUTPATIENT
Start: 2022-09-02 | End: 2022-10-03

## 2022-09-02 RX ORDER — ONDANSETRON 8 MG/1
8 TABLET, ORALLY DISINTEGRATING ORAL EVERY 8 HOURS PRN
Qty: 20 TABLET | Refills: 0 | Status: SHIPPED | OUTPATIENT
Start: 2022-09-02

## 2022-09-02 RX ADMIN — LIDOCAINE HYDROCHLORIDE 10 ML: 10 INJECTION, SOLUTION EPIDURAL; INFILTRATION; INTRACAUDAL at 10:41

## 2022-09-02 NOTE — TELEPHONE ENCOUNTER
Please cancel all of patient's scheduled infusions  He is starting a brand new treatment  Patient is still ok with going Friday's around 10am for his infusions but would like his blood draws to be scheduled at the infusion center on Wednesday's or Thursday's in the morning  If you could possibly schedule his CT for November, that would be great! If not, I'll schedule it the next time he is in our office  Thank you so much!

## 2022-09-02 NOTE — TELEPHONE ENCOUNTER
Left voice message with upcoming 2 cycles and follow up  Will try again this afternoon   Gaves Teams # if he calls back and he can also view his appts on [employer]

## 2022-09-02 NOTE — TELEPHONE ENCOUNTER
New plan in system, cancel all current scheduled infusion appointments  Please reschedule labs to be drawn prior to each infusion weds or thurs in the mornings  Schedule treatments for fridays around 10am  Thank you!

## 2022-09-02 NOTE — PROGRESS NOTES
Reviewed Face-Me printout for Enfortumab  Reviewed possible side effects and administration instructions  Reviewed [address] number and RN TEAMS number for further questions and concerns to patient and patient grand-daughter Kerline Borja  Both parties declined any further questions or concerns at this time      Informed consent obtained and uploaded on 9/2/22

## 2022-09-04 LAB
BACTERIA SPEC BFLD CULT: NO GROWTH
GRAM STN SPEC: NORMAL
GRAM STN SPEC: NORMAL

## 2022-09-05 LAB
BACTERIA SPEC BFLD CULT: NO GROWTH
GRAM STN SPEC: NORMAL
GRAM STN SPEC: NORMAL

## 2022-09-06 ENCOUNTER — TELEPHONE (OUTPATIENT)
Dept: HEMATOLOGY ONCOLOGY | Facility: CLINIC | Age: 77
End: 2022-09-06

## 2022-09-06 NOTE — TELEPHONE ENCOUNTER
Call out to grand-daughter Cedric Titus in regards to requested letter for cruise  Reviewed my chart message was sent with the letter and also it is available for  at [facility] office  Reviewed Krystian Hartman has an appointment on 9/16 with Dr Vani Flynn at [phone] as well       Left call back number and hours of operation to further discuss

## 2022-09-06 NOTE — TELEPHONE ENCOUNTER
Spoke with patient and gave 2 cycles of treatments and follow up   He said he would get the rest of the dates at his follow up on his AVS

## 2022-09-08 RX ORDER — SODIUM CHLORIDE 9 MG/ML
20 INJECTION, SOLUTION INTRAVENOUS ONCE
Status: CANCELLED | OUTPATIENT
Start: 2022-09-16

## 2022-09-08 RX ORDER — SODIUM CHLORIDE 9 MG/ML
20 INJECTION, SOLUTION INTRAVENOUS ONCE
Status: CANCELLED | OUTPATIENT
Start: 2022-09-30

## 2022-09-08 RX ORDER — SODIUM CHLORIDE 9 MG/ML
20 INJECTION, SOLUTION INTRAVENOUS ONCE
Status: CANCELLED | OUTPATIENT
Start: 2022-09-23

## 2022-09-09 PROCEDURE — 88341 IMHCHEM/IMCYTCHM EA ADD ANTB: CPT | Performed by: PATHOLOGY

## 2022-09-09 PROCEDURE — 88112 CYTOPATH CELL ENHANCE TECH: CPT | Performed by: PATHOLOGY

## 2022-09-09 PROCEDURE — 88342 IMHCHEM/IMCYTCHM 1ST ANTB: CPT | Performed by: PATHOLOGY

## 2022-09-09 PROCEDURE — 88305 TISSUE EXAM BY PATHOLOGIST: CPT | Performed by: PATHOLOGY

## 2022-09-13 ENCOUNTER — HOSPITAL ENCOUNTER (OUTPATIENT)
Dept: RADIOLOGY | Facility: HOSPITAL | Age: 77
Discharge: HOME | End: 2022-09-13
Attending: INTERNAL MEDICINE
Payer: MEDICARE

## 2022-09-13 DIAGNOSIS — R06.02 SHORTNESS OF BREATH: ICD-10-CM

## 2022-09-13 DIAGNOSIS — R06.02 SHORTNESS OF BREATH: Primary | ICD-10-CM

## 2022-09-13 PROCEDURE — 71046 X-RAY EXAM CHEST 2 VIEWS: CPT

## 2022-09-13 NOTE — PROGRESS NOTES
Reviewed if shortness of breath and or chest discomfort continues or worsens to call on call provider via [address] [place] [address] and or ED for further evaluation  Patient appreciative and agreeable to plan

## 2022-09-13 NOTE — PROGRESS NOTES
Received phone call from patient in regards to patient experiencing shortness of breath and left sided chest discomfort  Patient stated it feels like it did before the previous thoracentesis  Reviewed with Dr Gabrile Fuentes that patient reports, "I cannot Catch my Breath " "I am out of wind and wheezy " reviewed that patient had last thoracentesis on 9/2/22  Per Dr Gabriel Fuentes recommendations patient to have STAT chest xray done  Order placed  Reviewed [facility] in lab might be closest to patient  Reviewed chest xray is to see how much fluid and or if there is anything else  Patient agreeable to plan and appreciative of call

## 2022-09-14 ENCOUNTER — TELEPHONE (OUTPATIENT)
Dept: HEMATOLOGY ONCOLOGY | Facility: CLINIC | Age: 77
End: 2022-09-14

## 2022-09-14 ENCOUNTER — HOSPITAL ENCOUNTER (OUTPATIENT)
Dept: RADIOLOGY | Facility: HOSPITAL | Age: 77
Discharge: HOME | End: 2022-09-14
Attending: INTERNAL MEDICINE
Payer: MEDICARE

## 2022-09-14 VITALS
HEART RATE: 83 BPM | SYSTOLIC BLOOD PRESSURE: 113 MMHG | OXYGEN SATURATION: 97 % | RESPIRATION RATE: 18 BRPM | DIASTOLIC BLOOD PRESSURE: 68 MMHG

## 2022-09-14 DIAGNOSIS — J90 LARGE PLEURAL EFFUSION: Primary | ICD-10-CM

## 2022-09-14 DIAGNOSIS — J90 LARGE PLEURAL EFFUSION: ICD-10-CM

## 2022-09-14 PROCEDURE — 32555 ASPIRATE PLEURA W/ IMAGING: CPT

## 2022-09-14 NOTE — BRIEF OP NOTE (RAD/CATH)
IR THORACENTESIS Procedure Note    PATIENT NAME: Jesus Venegas  : 1945  MRN: [id number]    Pre-op Diagnosis:   1  Large pleural effusion      Post-op Diagnosis:   1  Large pleural effusion        Provider:   Rosann Bosworth  Assistants:     No qualified resident was available, Resident is only observing    Estimated Blood Loss: none  Findings: massive left pleural effusion  2000 ml clear magnolia pleural fluid      Specimens: none    Complications:  None immediate    Anesthesia: local    Rosann Bosworth     Date: 2022  Time: 1:02 PM

## 2022-09-14 NOTE — TELEPHONE ENCOUNTER
Call out to patient grand-daughter reviewed that IR STAT thoracentesis was scheduled for today at 1200  Reviewed instructions how to get to area and location for thoracentesis  Patient grand daughter reviewed and is aware and will update patient

## 2022-09-14 NOTE — TELEPHONE ENCOUNTER
Call out to patient in regards to Dr Maxwell Morris recommendations of patient to get thoracentesis to left side  Patient appreciative of call and agreeable to plan  Reviewed patient to call office in the meantime if patient has any further questions or concerns

## 2022-09-14 NOTE — SEDATION DOCUMENTATION
Left side thoracentesis performed by EVANGELINA Fregoso  2000ml, of serosanguineous fluid drained  Puncture site is CDI and covered with a band aid  No labs were ordered  Pt tolerated well

## 2022-09-14 NOTE — DISCHARGE INSTRUCTIONS
Thoracentesis   WHAT YOU NEED TO KNOW:   A thoracentesis is a procedure to remove extra fluid or air from between your lungs and your inner chest wall  Air or fluid buildup may make it hard for you to breathe  A thoracentesis allows your lungs to expand fully so you can breathe more easily  DISCHARGE INSTRUCTIONS:     Small amount of shoulder pain and bloody sputum is normal after a Thoracentesis  Rest:  Rest when you feel it is needed  Slowly start to do more each day  Return to your daily activities as directed  Resume your normal diet  Small sips of flat soda will help mild nausea  Do not smoke: If you smoke, it is never too late to quit  Ask for information about how to stop smoking if you need help  Contact Interventional Radiology at [phone] [place] PATIENTS: Contact Interventional Radiology at [phone]) [facility] Renteria PATIENTS: Contact Interventional Radiology at [phone]) if:   You have a fever  Your puncture site is red, warm, swollen, or draining pus  You have questions or concerns about your procedure, medicine, or care  Seek care immediately or call 911 if:   Severe chest pain with inspiration and shortness of breath    Large amounts of blood in your sputum    Follow up with your healthcare provider as directed

## 2022-09-15 ENCOUNTER — TELEPHONE (OUTPATIENT)
Dept: GYNECOLOGIC ONCOLOGY | Facility: CLINIC | Age: 77
End: 2022-09-15

## 2022-09-15 ENCOUNTER — HOSPITAL ENCOUNTER (OUTPATIENT)
Dept: INFUSION CENTER | Facility: HOSPITAL | Age: 77
End: 2022-09-15
Payer: MEDICARE

## 2022-09-15 DIAGNOSIS — Z95.828 PORT-A-CATH IN PLACE: Primary | ICD-10-CM

## 2022-09-15 DIAGNOSIS — C67.3 MALIGNANT NEOPLASM OF ANTERIOR WALL OF URINARY BLADDER (HCC): ICD-10-CM

## 2022-09-15 LAB
ALBUMIN SERPL BCP-MCNC: 3.4 G/DL (ref 3.5–5)
ALP SERPL-CCNC: 102 U/L (ref 43–122)
ALT SERPL W P-5'-P-CCNC: 51 U/L
ANION GAP SERPL CALCULATED.3IONS-SCNC: 7 MMOL/L (ref 5–14)
AST SERPL W P-5'-P-CCNC: 62 U/L (ref 17–59)
BASOPHILS # BLD AUTO: 0.04 THOUSANDS/ΜL (ref 0–0.1)
BASOPHILS NFR BLD AUTO: 0 % (ref 0–1)
BILIRUB SERPL-MCNC: 0.25 MG/DL (ref 0.2–1)
BUN SERPL-MCNC: 53 MG/DL (ref 5–25)
CALCIUM ALBUM COR SERPL-MCNC: 9.4 MG/DL (ref 8.3–10.1)
CALCIUM SERPL-MCNC: 8.9 MG/DL (ref 8.4–10.2)
CHLORIDE SERPL-SCNC: 103 MMOL/L (ref 96–108)
CO2 SERPL-SCNC: 25 MMOL/L (ref 21–32)
CREAT SERPL-MCNC: 2.64 MG/DL (ref 0.7–1.5)
EOSINOPHIL # BLD AUTO: 0.32 THOUSAND/ΜL (ref 0–0.61)
EOSINOPHIL NFR BLD AUTO: 3 % (ref 0–6)
ERYTHROCYTE [DISTWIDTH] IN BLOOD BY AUTOMATED COUNT: 13.7 % (ref 11.6–15.1)
GFR SERPL CREATININE-BSD FRML MDRD: 22 ML/MIN/1.73SQ M
GLUCOSE SERPL-MCNC: 173 MG/DL (ref 70–99)
HCT VFR BLD AUTO: 29.5 % (ref 36.5–49.3)
HGB BLD-MCNC: 9.4 G/DL (ref 12–17)
IMM GRANULOCYTES # BLD AUTO: 0.07 THOUSAND/UL (ref 0–0.2)
IMM GRANULOCYTES NFR BLD AUTO: 1 % (ref 0–2)
LYMPHOCYTES # BLD AUTO: 1.29 THOUSANDS/ΜL (ref 0.6–4.47)
LYMPHOCYTES NFR BLD AUTO: 11 % (ref 14–44)
MCH RBC QN AUTO: 29.6 PG (ref 26.8–34.3)
MCHC RBC AUTO-ENTMCNC: 31.9 G/DL (ref 31.4–37.4)
MCV RBC AUTO: 93 FL (ref 82–98)
MONOCYTES # BLD AUTO: 1.12 THOUSAND/ΜL (ref 0.17–1.22)
MONOCYTES NFR BLD AUTO: 9 % (ref 4–12)
NEUTROPHILS # BLD AUTO: 9.47 THOUSANDS/ΜL (ref 1.85–7.62)
NEUTS SEG NFR BLD AUTO: 76 % (ref 43–75)
NRBC BLD AUTO-RTO: 0 /100 WBCS
PLATELET # BLD AUTO: 267 THOUSANDS/UL (ref 149–390)
PMV BLD AUTO: 9.8 FL (ref 8.9–12.7)
POTASSIUM SERPL-SCNC: 4.8 MMOL/L (ref 3.5–5.3)
PROT SERPL-MCNC: 7.2 G/DL (ref 6.4–8.4)
RBC # BLD AUTO: 3.18 MILLION/UL (ref 3.88–5.62)
SODIUM SERPL-SCNC: 135 MMOL/L (ref 135–147)
WBC # BLD AUTO: 12.31 THOUSAND/UL (ref 4.31–10.16)

## 2022-09-15 PROCEDURE — 80053 COMPREHEN METABOLIC PANEL: CPT | Performed by: INTERNAL MEDICINE

## 2022-09-15 PROCEDURE — 85025 COMPLETE CBC W/AUTO DIFF WBC: CPT | Performed by: INTERNAL MEDICINE

## 2022-09-15 NOTE — TELEPHONE ENCOUNTER
Left message for patient to call the office to schedule NP appt with Dr Apurva Sullivan or Dr Carmita Carrillo

## 2022-09-16 ENCOUNTER — OFFICE VISIT (OUTPATIENT)
Dept: HEMATOLOGY ONCOLOGY | Facility: CLINIC | Age: 77
End: 2022-09-16
Payer: MEDICARE

## 2022-09-16 ENCOUNTER — TELEPHONE (OUTPATIENT)
Dept: HEMATOLOGY ONCOLOGY | Facility: CLINIC | Age: 77
End: 2022-09-16

## 2022-09-16 ENCOUNTER — TELEPHONE (OUTPATIENT)
Dept: PULMONOLOGY | Facility: CLINIC | Age: 77
End: 2022-09-16

## 2022-09-16 ENCOUNTER — HOSPITAL ENCOUNTER (OUTPATIENT)
Dept: INFUSION CENTER | Facility: HOSPITAL | Age: 77
End: 2022-09-16
Attending: INTERNAL MEDICINE
Payer: MEDICARE

## 2022-09-16 VITALS
WEIGHT: 216 LBS | OXYGEN SATURATION: 97 % | TEMPERATURE: 98 F | HEIGHT: 72 IN | HEART RATE: 86 BPM | BODY MASS INDEX: 29.26 KG/M2 | RESPIRATION RATE: 18 BRPM | DIASTOLIC BLOOD PRESSURE: 72 MMHG | SYSTOLIC BLOOD PRESSURE: 112 MMHG

## 2022-09-16 DIAGNOSIS — R59.0 RETROPERITONEAL LYMPHADENOPATHY: ICD-10-CM

## 2022-09-16 DIAGNOSIS — C67.3 MALIGNANT NEOPLASM OF ANTERIOR WALL OF URINARY BLADDER (HCC): Primary | ICD-10-CM

## 2022-09-16 DIAGNOSIS — N13.30 BILATERAL HYDRONEPHROSIS: ICD-10-CM

## 2022-09-16 DIAGNOSIS — J90 RECURRENT PLEURAL EFFUSION: ICD-10-CM

## 2022-09-16 DIAGNOSIS — R53.1 GENERALIZED WEAKNESS: ICD-10-CM

## 2022-09-16 PROCEDURE — 99214 OFFICE O/P EST MOD 30 MIN: CPT | Performed by: INTERNAL MEDICINE

## 2022-09-16 PROCEDURE — 96413 CHEMO IV INFUSION 1 HR: CPT

## 2022-09-16 PROCEDURE — 96367 TX/PROPH/DG ADDL SEQ IV INF: CPT

## 2022-09-16 RX ORDER — SODIUM CHLORIDE 9 MG/ML
20 INJECTION, SOLUTION INTRAVENOUS ONCE
Status: COMPLETED | OUTPATIENT
Start: 2022-09-16 | End: 2022-09-16

## 2022-09-16 RX ADMIN — SODIUM CHLORIDE 20 ML/HR: 0.9 INJECTION, SOLUTION INTRAVENOUS at 10:46

## 2022-09-16 RX ADMIN — ENFORTUMAB VEDOTIN 120 MG: 30 INJECTION, POWDER, LYOPHILIZED, FOR SOLUTION INTRAVENOUS at 11:26

## 2022-09-16 RX ADMIN — DEXAMETHASONE SODIUM PHOSPHATE: 10 INJECTION INTRAMUSCULAR; INTRAVENOUS at 10:48

## 2022-09-16 NOTE — TELEPHONE ENCOUNTER
Spoke with patient's granddaughter about updated follow up schedule to see Dr Brenda Mena  Provided details of all future follow up appointments  Patient's granddaughter acknowledged

## 2022-09-16 NOTE — TELEPHONE ENCOUNTER
Attempted to reach the office of pulmonary diseases 3 times  Left a voicemail for office on the 3rd call  Provided the office with patient's first and last name, date of birth, telephone number, and diagnosis code so that they could call and set up this appointment

## 2022-09-16 NOTE — PATIENT INSTRUCTIONS
Please wait for the thoracic surgery team to reach out to you regarding the Pleur-X catheter  You can also try calling their office for an appointment  Continue with the Enfortumab infusions  Please follow up with Dr Livan Gill in office in 1 week  A referral for pulmonologist has been provided to discuss about whether additional workup is needed for shortness of breath  Also  referral has been provided to assist with setting up rehab at [facility]

## 2022-09-16 NOTE — TELEPHONE ENCOUNTER
Received a VM from hematology regarding pt and setting up an appt  Per Gretchen Ramirez, patient needs to be seen ASAP  Called and left VM for patient advising him that we would like to bring him in ASAP and we have an appt on 9/20 at the [address] [place] [address]  Advised patient to call back to confirm appt

## 2022-09-16 NOTE — PROGRESS NOTES
Hematology/Oncology Outpatient Office Note    Date of Service: 2022    [facility] HEMATOLOGY ONCOLOGY SPECIALISTS CELSO Castillonorman  [facility]  [phone]    Reason for Consultation:   Chief Complaint   Patient presents with    Follow-up     Cancer Stage at diagnosis: II, interval progression to Stage IV    Primary Care Physician:  Cynthia Ball MD     Nickname: Ne Pollock    Spouse: Carmen Moran    Granddaughter: Granddaughter, Angelica     Original ECO    Today's ECO    Goals and Barriers:  Current Goal: Minimize effects of disease burden, extend life  Barriers to accomplishing this: persistent fatigue and weakness, depression, anxiety, worry, L foot claudication, lower back pain from spinal stenosis  Has been requiring walkers/canes when he outside of home  Patient's Capacity to Self Care:  Patient is able to self care    Code Status: Full code    Advanced directives: not on file but I recommended he get that done    ASSESSMENT & PLAN      Diagnosis ICD-10-CM Associated Orders   1  Malignant neoplasm of anterior wall of urinary bladder ([facility] [address] )  C67 3 Ambulatory Referral to Oncology Social Worker   2  Retroperitoneal lymphadenopathy  R59 0 Ambulatory Referral to Oncology Social Worker   3  Recurrent pleural effusion  J90 Ambulatory Referral to Pulmonology   4  Generalized weakness  R53 1 Ambulatory Referral to Oncology Social Worker     This is a 76 y o  c PMHx notable for spinal stenosis, Gastric bypass (), 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, diabetic nephropathy, and renal vascular disease, DM, being seen as follow-up for his bladder cancer  From an overall performance status basis, patient can tolerate systemic treatment due to an ECOG PS of 2      Thoughts on approach to systemic therapy in the first line and subsequent lines of therapy:    Preferred first line would have been cisplatin/gemcitabine however patient's overall performance status and kidney function were not amenable to this  Thoughts on prediction for Grade III-V toxicity in elderly patients to systemic chemotherapy:  Age >72 years   GI/ cancers  Falls in last 6 months   Hearing impairment (b/l hearing aids)  Limited ability to walk 1 block  Requires assistance with medications  Decreased social activities   Alo Whitman JCO 2011)  BMI<18 5 or moderate or severe weight loss or decrease in food intake in past 3 months  Mild vs moderate dementia/depression     The above 5 factors predict toxicity for the patient if he undergoes systemic chemotherapy and they were discussed with the patient  Discussion of disease response monitoring: We discussed with the patient at length the role of imaging and potential blood monitoring of burden of disease  We will opt to get CT chest, abdomen, pelvis without contrast due to kidney function as surveillance during and following therapy  As there was been progression of disease, liquid biopsy was performed to assess actionable biomarkers and determine bio-marker status  1/27/22 pt only got 1/2 of Days 1-5 5-FU due to contents spilling  Pt completed concurrent 5-FU/MTC + RT and found to have POD on 6/28/2022 PET/CT (lung mets) and had 2 cycles of q6 week Keytruda before POD  Decision made to transition to second line therapy for increased objective response and time to response due to significant burden of disease  During pt's office visit on 9/2/22, decision was made to start the patient on enfortumab vedotin due to progression of disease even on Keytruda; risks and benefits of the therapy was discussed with the patient and informed consent was signed to proceed with enfortumab  Patient did require 1 more round of thoracentesis for rapid accumulation of pleural fluid causing shortness of breath on 9/14/22; 2L of fluid was removed  Patient is scheduled for the 1st infusion to start today, 09/16/2022      Discussion of decision making   Oncology history updated, accordingly, during this visit   Goals of care/patient communication  o I discussed with the patient the clinical course leading up to their cancer diagnosis  I reviewed relevant office notes, imaging reports and pathology result as well   o I told the patient that this is a case of incurable disease and what this means  We discussed that the goal of anti-cancer therapy is to provide best quality of life, extend overall survival, and progression free survival as shown in clinical trials  We also discussed that there might be a point when the cancer will no longer respond to this anti-neoplastic therapy and thus he is seeing palliative care    o I explained the risks/benefits of the proposed cancer therapy: Enfortumab and after discussion including understanding risks of possible life-threatening complications and therapy-related malignancy development, informed consent for treatment has been signed   TNM/Staging At Diagnosis  Cancer Staging  Malignant neoplasm of anterior wall of urinary bladder ([facility] [address] )  Staging form: Urinary Bladder, AJCC 8th Edition  - Clinical stage from 10/18/2021: Stage II (cT2, cN0, cM0) - Signed by Lynn Padgett MD on 11/17/2021  Stage prefix: Initial diagnosis    Clinical staging from January 2022: Stage II has progressed to Stage IV bladder cancer given retroperitoneal lymphadenopathy and concern for metastasis     Disease Features/Tumor Markers/Genetics  o Tumor Marker: n/a  o Notable Path Features: anterior wall Invasive high-grade urothelial carcinoma  Carcinoma invades muscularis propria (detrusor muscle)   Guardant 360 showing CDK4 amplification, TP53 mutation   Current Treatment:  Enfortumab   Other Supportive care:    Treatment Team Members  o Surgeon: Dr Frida Malik: Dr Girma Feliz  o Palliative: April Wheat Jaime  o Nephrology: Dr Benitez Pen: creat 2 64 (eGFR 22), normocytic anemia, Hgb 9 4   Diagnostics: 11/11/21 CT CAP w/o c: 1    No metastatic disease within the limitations of noncontrast technique in the chest abdomen or pelvis  2   Diffuse hemicircumferential smooth bladder wall thickening on the left, similar to the prior study when allowing for differences in bladder distention  Prostatomegaly  6/3/2022 CT CAP w/o c: Development of a few pulmonary nodules, the largest measuring 8 mm  A PET CT would be recommended  Worsening wall thickening of the urinary bladder which may represent post treatment changes  Cannot exclude cystitis or progression of cancer  6/28/2022 PET/CT: read pending, per my assessment lung metastatic hypermetabolic nodules as well as L chest wall nodule  8/26/2022 CT CAP w/o c: Pt with new large L pleural effusion contributing to his pleuritic CP  There is now bulky retroperitoneal lymphadenopathy, not seen on the prior study  The largest lymph node measures approximately 3 2 x 2 7 cm and is located to the left of the aorta concerning for metastatic disease  There is a lobulated 1 cm nodule in the left upper lobe on series 3, image 85 not seen on the prior study  There is a 7 mm nodule in the right middle lobe, increased in size since prior study with surrounding groundglass density, nonspecific  Stable 2 mm nodule right middle lobe series 3, image 85  Previously noted nodule adjacent to the major fissure in the lingula is obscured by patchy airspace consolidation  8/30/2022 NM Bone scan: No scintigraphic evidence of osseous metastasis  Port is without acute issues  Enfortumab:   Overall, 55 participants (44%) with KUMAR  15 patients had CR  Responses to treatment lasted a median of mPFS 7 6 months  Discussion of decision making  All of the available lab results, the image studies, pathology, other specialty/physicians consult notes and recommendations, and outside medical records were reviewed  Everything was discussed the patient and all the workup findings were shared   We discussed the diagnosis and management plan as below  · Plan/Labs  · Start Enfortumab q28 cycles today 9/16/22  · Plan to see the patient back in oncology clinic in 1 month from today  · Thoracic surgery team to reach out to the patient to discuss about Pleur-X catheter placement for the patient as he seems to be accumulating fluid rapidly and has already required 2 rounds of thoracentesis within the last 2 weeks  · Patient expressed interest in going to [facility] to get stronger as his activity level has drastically reduced in the recent months   team has been consulted to assist with this  · Continue to assess for signs of distress as he has anxiety/depression  F/u Palliative for cancer associated pain  · F/u Urology for care and management of neph tubes  · Restaging CT CAP in 2 months from starting of enfortumab (scheduled for 11/25/22)  Follow Up:  Every 4 weeks while on q28 day systemic therapy    All questions were answered to the patient's satisfaction during this encounter  The patient knows the contact information for our office and knows to reach out for any relevant concerns related to this encounter  They are to call for any temperature 100 4 or higher, new symptoms including but not restricted to shaking chills, decreased appetite, nausea, vomiting, diarrhea, increased fatigue, shortness of breath or chest pain, confusion, and not feeling the strength to come to the clinic  For all other listed problems and medical diagnosis in their chart - they are managed by PCP and/or other specialists, which the patient acknowledges  Thank you very much for your consultation and making us a part of this patient's care  We are continuing to follow closely with you  Please do not hesitate to reach out to us with any additional questions or concerns        ONCOLOGY HISTORY OF PRESENT ILLNESS        Oncology History   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)    Initial Diagnosis    Malignant neoplasm of anterior wall of urinary bladder ([facility] [address] )     1994 Surgery    TURBT      1994 - 1995 Chemotherapy    Induction intravesical BCG x 2      8/17/2016 Surgery    TURBT      12/13/2016 Surgery    TURBT  [facility])    Bladder, left posterior wall, biopsy: High-grade urothelial carcinoma in-situ  Muscularis propria is identified with no tumor seen  Bladder, trigone, biopsy: Non-invasive high-grade papillary urothelial carcinoma, and flat urothelial carcinoma in-situ  Muscularis propria is not identified  1/4/2017 - 2/8/2017 Chemotherapy    Induction intravesical BCG     6/19/2017 Surgery    TURBT  [facility])    - Posterior wall, biopsy: Mild chronic cystitis with focal urothelial atypia  Muscularis propria is identified      - Right lateral wall, biopsy: Granulomatous cystitis  Muscularis propria is identified  - Left lateral wall, biopsy: Non invasive high-grade urothelial carcinoma  Muscularis propria is not identified  4/26/2019 Surgery    TURBT   [facility])     - bladder, right posterior wall, biopsy: Urothelial carcinoma in situ   - bladder, right lateral wall, biopsy: Small focus of urothelial carcinoma in situ  - bladder, left posterior wall, biopsy: Urothelial carcinoma in situ   - bladder, left lateral wall, biopsy: Urothelial carcinoma in situ     - bladder, trigone, biopsy: Invasive high-grade urothelial carcinoma, invading into lamina propria  No lymphovascular invasion seen  Muscularis propria not present  Separate piece showing urothelial carcinoma in situ        7/2019 -  Chemotherapy    Induction intravesical BCG x 4      11/4/2019 Surgery    TURBT  [facility])    - Superficial bladder tumor, transurethral resection: Non-invasive high grade urothelial carcinoma, with urothelial carcinoma in situ  Muscularis propria is not identified      - Bladder tumor, transurethral resection: Non-invasive high grade urothelial carcinoma, with urothelial carcinoma in situ, see note  Muscularis propria is present and free of tumor  12/9/2019 - 1/28/2020 Chemotherapy    Induction intravesical BCG+INF        6/9/2020 - 6/23/2020 Chemotherapy    Maintenance intravesical BCG+INF        11/19/2020 Biopsy    TURBT (Aron Meier, Dr Gladys Mclaughlin)    A  Urinary Bladder, Left Posterior Bladder Wall:  -Extensively- denuded urothelial lined mucosa with mild chronic inflammation, vascular congestion  And edema   -Unremarkable small fragment of detrusor muscle present  -No evidence of invasive urothelial carcinoma seen     B  Urinary Bladder, Left lateral bladder Wall:  -Partially-denuded urothelial lined mucosa with mild  chronic inflammation  -Unremarkable small fragment of detrusor muscle present  -No evidence of invasive urothelial carcinoma seen     C  Urinary Bladder, Right Posterior Bladder wall:  -Urothelial lined mucosa with mild  chronic inflammation Von Brunn nests and mild urothelial atypia, possibly due to previous BCG administration   -Unremarkable small fragment of detrusor muscle present  -No evidence of invasive urothelial carcinoma seen     D  Urinary Bladder, Right Lateral Bladder Wall:  - Urothelial lined mucosa with mild  chronic inflammation   -Muscularis propria/ detrusor muscle is not present for evaluation    -No evidence of invasive urothelial carcinoma seen  E  Prostate, Prostate Tissue:  -Urothelial lined mucosa with mild chronic inflammation, prominent Von Brunn nests and Cystitis cystica   -Unremarkable small fragment of detrusor muscle present   -No evidence of malignancy seen  10/18/2021 -  Cancer Staged    Staging form: Urinary Bladder, AJCC 8th Edition  - Clinical stage from 10/18/2021: Stage II (cT2, cN0, cM0) - Signed by Gil Montana MD on 11/17/2021  Stage prefix: Initial diagnosis       10/18/2021 Surgery    T[facility]BT ([facility])    A    Left posterior wall, bladder (transurethral resection):     - Urothelial tissue with small atypical cauterized focus favored to represent superficially invasive high-grade urothelial carcinoma  - Muscularis propria (detrusor muscle) present, and negative for carcinoma  - Marked edema and mucosal denudation with thermal artifact noted  B  Anterior wall, bladder (transurethral resection):      - Invasive high-grade urothelial carcinoma  - Carcinoma invades muscularis propria (detrusor muscle)  C   Left lateral wall, bladder (transurethral resection):     - Urothelial tissue with small atypical focus with marked thermal artifact; cannot exclude superficial carcinoma  - Muscularis propria (detrusor muscle) present, and negative for carcinoma        - Marked edema and mucosal denudation with thermal artifact noted     1/24/2022 - 3/26/2022 Chemotherapy    mitoMYcin (MUTAMYCIN), 12 mg/m2 = 28 7 mg (100 % of original dose 12 mg/m2), Intravenous, Once, 1 of 1 cycle  Dose modification: 12 mg/m2 (original dose 12 mg/m2, Cycle 1), 3 mg/m2 (original dose 12 mg/m2, Cycle 1)  Administration: 7 2 mg (1/24/2022)  fluorouracil (ADRUCIL) ambulatory infusion Soln, 500 mg/m2/day = 4,780 mg (50 % of original dose 1,000 mg/m2/day), Intravenous, Over 96 hours, 1 of 1 cycle, Start date: 1/18/2022, End date: 1/23/2022  Dose modification: 500 mg/m2/day (original dose 1,000 mg/m2/day, Cycle 1, Reason: Dose modified as per discussion with consulting physician)     1/24/2022 - 3/24/2022 Radiation    Pelvis:1 6X 21 / 21 275 0 5,775 59      Treatment dates:  C1: 1/24/2022 - 3/24/2022     7/15/2022 - 8/26/2022 Chemotherapy    pembrolizumab (KEYTRUDA) IVPB, 400 mg, Intravenous, Once, 2 of 6 cycles  Administration: 400 mg (7/15/2022), 400 mg (8/26/2022)     9/16/2022 -  Chemotherapy    enfortumab vedotin-ejfv (PADCEV) IVPB, 125 mg (original dose 1 25 mg/kg), Intravenous, Once, 1 of 6 cycles  Dose modification: 125 mg (original dose 1 25 mg/kg, Cycle 1, Reason: Dose modified as per discussion with consulting physician)       He had been getting BCG vaccine for his bladder since 1994 6/28/2022 PET/CT: read pending, per my assessment lung metastatic hypermetabolic nodules as well as L chest wall nodule    SUBJECTIVE  (INTERVAL HISTORY)      Clotting History None   Bleeding History No major events   Cancer History Bladder   Family Cancer History None   H/O Blood/Plt Transfusion None   Tobacco/etoh/drug abuse 1 5 PPD x 17 years (quit 1970), no other abuse   Hx COVID19 Infection and Vaccine Status Pfizer x 3 (had booster 9/2021)   Cancer Screening history n/a   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)   New medications in the last month: PO iron daily (recommended MWF)  Pain: dysuria (since 10/2021 bladder procedure), LBP, L foot pain s/p remote toe amputation     His maximum weight prior to the gastric bypass that he had was 330lbs  I have reviewed the relevant past medical, surgical, social and family history  I have also reviewed allergies and medications for this patient  Interval events:   Patient presented to the clinic today with his granddaughter, Ja Tyler  Patient continues feeling very fatigued and weak throughout the day  He manages to do his ADLs, however he gets tired very easily  He did experience significant shortness of breath, a chest x-ray was performed on 09/13/2022, which showed a large left pleural effusion, for which patient was referred to year  He did undergo thoracentesis on 09/14/2022, removing 2 L of fluid  Since then he has felt significantly better, although he continues to have pain at the site of thoracentesis, generalized weakness, and mild shortness of breath with activity  He did express interest in going to rehab to increase his activity level  Denied any bloody output through his nephrostomy tubes  His weight and appetite has been stable  His potassium level has normalized to within normal limits after taking Veltassa      Review of Systems  Denies unintentional weight loss, F/C, N/V, CP, diarrhea, constipation, rash, itching, melena, hematuria, hematochezia  Chronic mild SOB with exertion and fatigue  A 10-point review of system was performed, pertinent positive and negative were detailed as above  Otherwise, the 10-point review of system was negative  Past Medical History:   Diagnosis Date    Anemia     Last assessed: 9/28/17    Anxiety     Arteriosclerotic cardiovascular disease     Last assessed: 9/28/17    Arthritis     Bladder cancer ([facility] [address] )     bladder- had BCG treatments    Chronic kidney disease     Stage IV    CKD (chronic kidney disease) stage 4, GFR 15-29 ml/min ([facility])     Colon polyp     Coronary artery disease     7 stents    Depression     Diabetes mellitus ([facility])     IDDM    GERD (gastroesophageal reflux disease)     Glaucoma     Hematuria     History of fusion of cervical spine     Hyperlipidemia     Hypertension     Insomnia     Last assessed: 11/14/12    Loss of hearing     has hearing aids but usually does not wear them    Other seasonal allergic rhinitis     Last assessed: 2/10/16    PAD (peripheral artery disease) ([facility])     Shortness of breath     on exertion    Spinal stenosis of lumbar region     Transient cerebral ischemia     No Residual    Uses walker     w/c for longer distances       Past Surgical History:   Procedure Laterality Date    CARDIAC SURGERY      Cath stent placement  Last assessed: 3/9/17  Interventional Catheterization    CHOLECYSTECTOMY      COLONOSCOPY      CYSTOSCOPY      Diagnostic w/biopsy  Ashok Bars  Last assessed: 12/1/14    CYSTOSCOPY N/A 4/12/2022    Procedure: CYSTOSCOPY  Bladder biopsies  ;  Surgeon: Alecia Webb MD;  [facility];  Service: Urology    CYSTOSCOPY W/ RETROGRADES Right 3/1/2022    Procedure: CYSTO; stent removal retrograde;  Surgeon: Alecia Webb MD;  [facility];  Service: Urology    [address] [place] [address] Bilateral 10/18/2021    Procedure: bilateral retrogrades, cytology collection;  Surgeon: Alecia Webb MD;  [facility];  Service: Urology    CYSTOURETHROSCOPY      w/cautery  Ashok Bars    FL RETROGRADE PYELOGRAM  10/18/2021    FL RETROGRADE PYELOGRAM  10/24/2021    GASTRIC BYPASS      For morbid obesity w/Shaji-en-Y   Resolved: 11/17/09    INCISION AND DRAINAGE OF WOUND Right 2/26/2017    Procedure: INCISION AND DRAINAGE (I&D) EXTREMITY WITH APPLICATION OF GRAFT JACKET;  Surgeon: Jason Covington DPM;  [facility];  Service:     INCISION AND DRAINAGE OF WOUND Right 4/25/2017    Procedure: INCISION AND DRAINAGE (I&D) EXTREMITY, APPLICATION OF GRAFT;  Surgeon: Jason Covington DPM;  [facility];  Service:     IR BIOPSY OTHER  7/2/2020    IR LOWER EXTREMITY ANGIOGRAM  2/8/2021    IR LOWER EXTREMITY ANGIOGRAM  2/11/2021    IR NEPHROSTOMY TUBE CHECK/CHANGE/REPOSITION/REINSERTION/UPSIZE  4/28/2022    IR NEPHROSTOMY TUBE CHECK/CHANGE/REPOSITION/REINSERTION/UPSIZE  5/24/2022    IR NEPHROSTOMY TUBE CHECK/CHANGE/REPOSITION/REINSERTION/UPSIZE  6/7/2022    IR NEPHROSTOMY TUBE CHECK/CHANGE/REPOSITION/REINSERTION/UPSIZE  7/28/2022    IR NEPHROSTOMY TUBE PLACEMENT  2/25/2022    IR PORT PLACEMENT  1/17/2022    IR THORACENTESIS  9/2/2022    IR THORACENTESIS  9/14/2022    IR TUNNELED CENTRAL LINE PLACEMENT  12/24/2020    JOINT REPLACEMENT      christofer knees replaced    CA AMPUTATION METATARSAL+TOE,SINGLE Left 12/21/2020    Procedure: RAY RESECTION FOOT;  Surgeon: Palmer Kinney DPM;  [facility];  Service: Podiatry    CA AMPUTATION METATARSAL+TOE,SINGLE Left 12/31/2020    Procedure: 5TH MET RESECTION;  Surgeon: Palmer Kinney DPM;  [facility];  Service: Podiatry    CA CYSTOURETHROSCOPY W/IRRIG & EVAC CLOTS N/A 2/10/2021    Procedure: CYSTOSCOPY EVACUATION OF CLOTS, fulguration;  Surgeon: Wong Archer MD;  [facility];  Service: Urology    CA CYSTOURETHROSCOPY W/IRRIG & EVAC CLOTS N/A 10/24/2021    Procedure: CYSTOSCOPY EVACUATION OF CLOT, fulguration of bleeding vessels, right ureter stent placement, retrograde pyelogram;  Surgeon: Jamal Arana MD;  [facility];  Service: Urology    NE CYSTOURETHROSCOPY,BIOPSY N/A 8/16/2016    Procedure: CYSTOSCOPY WITH BIOPSIES;  Surgeon: Wei Rosen MD;  [facility];  Service: Urology    NE CYSTOURETHROSCOPY,FULGUR <0 5 CM LESN N/A 11/19/2020    Procedure: CYSTO W/BIOPSIES, transurethral prostate bx;  Surgeon: Jennyfer Huynh MD;  [facility];  Service: Urology    NE CYSTOURETHROSCOPY,FULGUR >5 CM LESN Bilateral 10/18/2021    Procedure: TRANSURETHRAL RESECTION OF BLADDER TUMOR (TURBT);   Surgeon: Lor Calzada MD;  [facility];  Service: Urology    NE [facility] 3RD+ ORD [address] PEL/LXTR [facility] Left 2/8/2021    Procedure: LEG angiogram, CO2 w/limited contrast with balloon angioplasty postertior tibial artery;  Surgeon: Albania Painting MD;  [facility];  Service: Vascular    ROTATOR CUFF REPAIR      SMALL INTESTINE SURGERY      Surgery Shaji-en-Y    SPINAL FUSION      lumbar and cervical fusions    VAC DRESSING APPLICATION Right 9/41/1431    Procedure: APPLICATION VAC DRESSING;  Surgeon: Tim Gottron, DPM;  [facility];  Service:    [address] [place] [address] Left 2/16/2021    Procedure: FOOT DEBRIDE, [address] OUT w/graft application;  Surgeon: Tim Gottron, DPM;  [facility];  Service: Podiatry       Family History   Problem Relation Age of Onset    Diabetes Mother     Heart disease Mother     Other Mother         High blood pressure    Heart disease Father     Diabetes Sister     Other Sister         High blood pressure    Kidney disease Sister     Heart disease Brother     Other Brother         High blood pressure       Social History     Socioeconomic History    Marital status: /Civil Union     Spouse name: Not on file  Number of children: Not on file    Years of education: Not on file    Highest education level: Not on file   Occupational History    Not on file   Tobacco Use    Smoking status: Former Smoker     Packs/day: 3 00     Years: 27 00     Pack years: 81 00     Types: Cigarettes     Quit date: 1970     Years since quittin 7    Smokeless tobacco: Never Used   Vaping Use    Vaping Use: Never used   Substance and Sexual Activity    Alcohol use: Never     Comment: beer / liquor    Drug use: Not Currently     Types: Marijuana     Comment: quit 2019 had medical marijuana    Sexual activity: Not Currently   Other Topics Concern    Not on file   Social History Narrative    Consumes 1 cup of coffee and 1 soda per day     Social Determinants of Health     Financial Resource Strain: Not on file   Food Insecurity: Not on file   Transportation Needs: Not on file   Physical Activity: Not on file   Stress: Not on file   Social Connections: Not on file   Intimate Partner Violence: Not on file   Housing Stability: Not on file       Allergies   Allergen Reactions    Atorvastatin Hives, Itching and Rash    Simvastatin Rash and Edema     Edema of lower legs    Statins Hives and Itching    Insulin Lispro Swelling and Edema     " Lower Legs"    Other Itching, Rash and Other (See Comments)     "EKG Patches"   "blue EKG patches"       Current Outpatient Medications   Medication Sig Dispense Refill    Accu-Chek Softclix Lancets lancets Test blood sugar 4 times daily 200 each 0    acetaminophen (TYLENOL) 325 mg tablet Take 650 mg by mouth every 6 (six) hours as needed for mild pain        ALPRAZolam (XANAX) 0 25 mg tablet At twice a day as needed for anxiety 30 tablet 0    aspirin (ECOTRIN LOW STRENGTH) 81 mg EC tablet Take 1 tablet (81 mg total) by mouth daily      Azelastine HCl 0 15 % SOLN Inhale 1 spray 2 (two) times a day 30 mL 3    Bimatoprost (LUMIGAN OP) Apply 1 drop to eye daily at bedtime       calcitriol (ROCALTROL) 0 25 mcg capsule Take 1 capsule (0 25 mcg total) by mouth daily 90 capsule 0    docusate sodium (COLACE) 100 mg capsule TAKE ONE CAPSULE BY MOUTH TWICE A  capsule 0    DULoxetine (CYMBALTA) 30 mg delayed release capsule TAKE ONE CAPSULE BY MOUTH EVERY DAY 90 capsule 0    ezetimibe (ZETIA) 10 mg tablet TAKE ONE TABLET BY MOUTH ONCE DAILY IN THE EARLY MORNING 90 tablet 0    Ferrous Sulfate Dried (Feosol) 200 (65 Fe) MG TABS Take 65 mg by mouth daily 90 tablet 0    fluocinonide (LIDEX) 0 05 % cream Apply topically 2 (two) times a day (Patient taking differently: Apply topically as needed) 30 g 0    fluticasone (FLONASE) 50 mcg/act nasal spray 1 spray into each nostril daily (Patient taking differently: 1 spray into each nostril as needed) 11 mL 1    furosemide (LASIX) 20 mg tablet Take 1 tablet (20 mg total) by mouth daily 90 tablet 1    gabapentin (Neurontin) 300 mg capsule Take 1 capsule (300 mg total) by mouth daily at bedtime 90 capsule 0    glucose blood (Accu-Chek Martha Plus) test strip Test blood sugar 4 times daily 200 strip 0    Insulin Pen Needle 31G X 8 MM MISC Inject 3 times a day 100 each 2    isosorbide mononitrate (IMDUR) 30 mg 24 hr tablet Take 1 tablet (30 mg total) by mouth daily in the early morning 90 tablet 0    Lantus SoloStar 100 units/mL injection pen 18 units qhs (Patient taking differently: Inject 18 Units under the skin daily at bedtime) 30 mL 1    lidocaine (XYLOCAINE) 2 % topical gel Apply topically as needed for mild pain 30 mL 0    lidocaine-prilocaine (EMLA) cream Apply topically as needed for mild pain 30 g 0    loperamide (IMODIUM) 2 mg capsule Take 2 mg by mouth 4 (four) times a day as needed for diarrhea      metoprolol succinate (TOPROL-XL) 100 mg 24 hr tablet TAKE ONE TABLET BY MOUTH ONCE DAILY 90 tablet 0    multivitamin (THERAGRAN) TABS Take 1 tablet by mouth daily        naloxone (NARCAN) 4 mg/0 1 mL nasal spray Administer 1 spray into a nostril  If no response after 2-3 minutes, give another dose in the other nostril using a new spray  1 each 1    nitrofurantoin (MACROBID) 100 mg capsule Take 1 capsule (100 mg total) by mouth 2 (two) times a day 10 capsule 0    NovoLOG FlexPen 100 units/mL injection pen 10 units with each meal  (Patient taking differently: Inject 10 Units under the skin 3 (three) times a day with meals) 5 pen 1    omeprazole (PriLOSEC) 20 mg delayed release capsule Take 20 mg by mouth daily in the early morning PRN       ondansetron (Zofran ODT) 8 mg disintegrating tablet Take 1 tablet (8 mg total) by mouth every 8 (eight) hours as needed for nausea or vomiting 20 tablet 0    oxybutynin (DITROPAN-XL) 10 MG 24 hr tablet TAKE ONE TABLET BY MOUTH ONCE DAILY 30 tablet 0    oxyCODONE (OxyCONTIN) 10 mg 12 hr tablet Take 1 tablet (10 mg total) by mouth daily at bedtime Max Daily Amount: 10 mg 90 tablet 0    oxyCODONE (Roxicodone) 5 immediate release tablet Take 1 tablet (5 mg total) by mouth every 4 (four) hours as needed for moderate pain Max Daily Amount: 30 mg 60 tablet 0    phenazopyridine (PYRIDIUM) 200 mg tablet Take 1 tablet (200 mg total) by mouth 3 (three) times a day with meals 10 tablet 0    predniSONE 20 mg tablet Take 1 tablet (20 mg total) by mouth daily 5 tablet 0    senna (SENOKOT) 8 6 MG tablet Take 1 tablet (8 6 mg total) by mouth daily 30 tablet     sodium chloride, PF, 0 9 % 10 mL by Intracatheter route daily Bilateral PCNs to be flushed daily with prefilled 10cc  mL 3    ARIPiprazole (ABILIFY) 2 mg tablet Take 1 tablet (2 mg total) by mouth daily 30 tablet 0    sodium chloride, PF, 0 9 % 10 mL by Intracatheter route daily Intracatheter flushing daily 900 mL 0    sodium chloride, PF, 0 9 % 10 mL by Intracatheter route daily Intracatheter flushing daily 900 mL 0     No current facility-administered medications for this visit       Facility-Administered Medications Ordered in Other Visits   Medication Dose Route Frequency Provider Last Rate Last Admin    enfortumab vedotin-ejfv (PADCEV) 120 mg in sodium chloride 0 9 % 100 mL IVPB  120 mg Intravenous Once Yumiko Benedict MD        ondansetron (ZOFRAN) 16 mg, dexamethasone (DECADRON) 10 mg in sodium chloride 0 9 % 50 mL IVPB   Intravenous Once Yumkio Benedict  mL/hr at 09/16/22 [facility] at 09/16/22 1048       (Not in a hospital admission)      Objective:     24 Hour Vitals Assessment:     Vitals:    09/16/22 0908   BP: 112/72   Pulse: 86   Resp: 18   Temp: 98 °F (36 7 °C)   SpO2: 97%       PHYSICIAN EXAM:    General: Appearance: alert, cooperative, NAD, using a walker for today's visit  HEENT: Normocephalic, atraumatic  No scleral icterus  conjunctivae clear  EOMI  Chest: No tenderness to palpation  No open wound noted  Lungs: L sided diminished compared to the R, Respirations unlabored  Cardiac: Regular rate and rhythm, +S1and S2  Abdomen: Soft, non-tender, non-distended  Bowel sounds are normal    :  Bilateral nephrostomy tubes present, draining clear urine  Extremities:  No edema, cyanosis, clubbing  Skin: Skin color, turgor are normal    Lymphatics: no palpable supra-cervical, axillary, or inguinal adenopathy  Neurologic: Awake, Alert, and oriented, no gross focal deficits noted b/l  Port c/d/i      DATA REVIEW:    Pathology Result:    Final Diagnosis   Date Value Ref Range Status   09/02/2022   Final    A B  Pleural fluid, Left, Thoracentesis (ThinPrep and cell block preparations):  Negative for malignancy  Benign mesothelial cells, lymphocytes and histiocytes  Satisfactory for evaluation  04/12/2022   Final    A  Urinary Bladder, selected bladder biopsies:  - Focally intact urothelial mucosa with associated acutely inflamed granulation tissue  See comment  B  Prostatic urethra:  - Focally intact urothelial mucosa with associated acutely inflamed granulation tissue  See comment      Comment: Immunohistochemistry for AE1/3 is negative for an infiltrative pattern  Clinical history notable for status post chemotherapy and urine culture positive for Serratia marcescens  The findings are compatible with infectious cystitis  10/18/2021   Final    A  Left posterior wall, bladder (transurethral resection):     - Urothelial tissue with small atypical cauterized focus favored to represent superficially invasive high-grade urothelial carcinoma  - Muscularis propria (detrusor muscle) present, and negative for carcinoma  - Marked edema and mucosal denudation with thermal artifact noted  B  Anterior wall, bladder (transurethral resection):      - Invasive high-grade urothelial carcinoma  - Carcinoma invades muscularis propria (detrusor muscle)  C   Left lateral wall, bladder (transurethral resection):     - Urothelial tissue with small atypical focus with marked thermal artifact; cannot exclude superficial carcinoma  - Muscularis propria (detrusor muscle) present, and negative for carcinoma  - Marked edema and mucosal denudation with thermal artifact noted  Comment: This is an appended report  These results have been appended to a previously preliminary verified report  10/18/2021   Final    A  Renal Washing, RIGHT RENAL PELVIS WASHING:  Suspicious for high grade urothelial carcinoma [facility]) - see comment  Comment:  The above diagnostic category is from the recently published book, [facility] [place] [facility] Urinary Cytology, and is in keeping with the ongoing effort for utilization of standardized diagnostic terminology in urine cytology  *    *[facility] [place] [facility] Urinary Cytology  Renetta Beckwith; 2016  B  Renal Washing, LEFT RENAL PELVIS WASHING:  Atypical urothelial cells (AUC) - see comment      Comment:  The above diagnostic category is from the recently published book, [facility] [place] [facility] Urinary Cyt[facility], and is in keeping with the ongoing effort for utilization of standardized diagnostic terminology in urine cytology  *    *[facility] Urinary Cytology  Renetta FREDERICK  Eddie Cuba; 2016 09/03/2021   Final    A  Urine, Voided, :  Atypical urothelial cells (AUC) - see comment  Red blood cells     Satisfactory for evaluation  Comment:    - Few atypical urothelial cells noted in this patient with a 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  A high grade urothelial carcinoma cannot be excluded on this material  Suggest clinical correlation and follow-up as indicated  - The above diagnostic category is from the recently published book, [facility] [place] [facility] Cyt[facility], and is in keeping with the ongoing effort for utilization of standardized diagnostic terminology in urine cytology  *    *[facility] Urinary Cytology  Renetta L  Eddie Cuba; 2016 '          12/31/2020   Final    A  Left 5th metatarsal bone:  - Acute osteomyelitis in benign metatarsal bone  12/21/2020   Final    A  Bone, left 5th metatarsal, amputation:       - Acute osteomyelitis  - No dysplasia or malignancy is identified  B  Bone, 5th metatarsal clean margin, excision:       - Focal acute osteomyelitis  - Large caliber blood vessels with luminal calcifications and greater than 75% occlusion        - No dysplasia or malignancy is identified  Interpretation performed at [address] [address]       11/19/2020   Final    A  Urinary Bladder, Left Posterior Bladder Wall:  -Extensively- denuded urothelial lined mucosa with mild chronic inflammation, vascular congestion  And edema   -Unremarkable small fragment of detrusor muscle present  -No evidence of invasive urothelial carcinoma seen    B   Urinary Bladder, Left lateral bladder Wall:  -Partially-denuded urothelial lined mucosa with mild  chronic inflammation  -Unremarkable small fragment of detrusor muscle present  -No evidence of invasive urothelial carcinoma seen    C  Urinary Bladder, Right Posterior Bladder wall:  -Urothelial lined mucosa with mild  chronic inflammation Von Brunn nests and mild urothelial atypia, possibly due to previous BCG administration   -Unremarkable small fragment of detrusor muscle present  -No evidence of invasive urothelial carcinoma seen    D  Urinary Bladder, Right Lateral Bladder Wall:  - Urothelial lined mucosa with mild  chronic inflammation   -Muscularis propria/ detrusor muscle is not present for evaluation    -No evidence of invasive urothelial carcinoma seen  E  Prostate, Prostate Tisssue:  -Urothelial lined mucosa with mild chronic inflammation, prominent Von Brunn nests and Cystitis cystica   -Unremarkable small fragment of detrusor muscle present   -No evidence of malignancy seen  08/17/2020   Final    A  Urine, Other, :  Negative for high grade urothelial carcinoma ([address]) - see comment  Urothelial cells, squamous cells, red blood cells and neutrophils  Satisfactory for evaluation  Comment:  The above diagnostic category is from the recently published book, [facility] [place] [facility] Urinary Cytology, and is in keeping with the ongoing effort for utilization of standardized diagnostic terminology in urine cytology  *    *[facility] [place] [facility] Urinary Cytology  Renetta Sterling; 2016 08/16/2016   Final    A   Bladder, biopsy:  -  High-grade urothelial carcinoma with flat and focal papillary architecture (see note)  07/18/2016   Final    A  Urine, clean catch (ThinPrep): Atypical urothelial cells (AUC) - see comment  Atypical single urothelial cells, benign squamous cells, red blood cells and neutrophils  Background of degenerative changes noted  Satisfactory for evaluation  Comment:  The above diagnostic category is from the recently published book, [facility] [place] [facility]inary Cytology, and is in keeping with the ongoing effort for utilization of standardized diagnostic terminology in urine cytology  *    *[facility] [place] [facility]inary Cyt[facility]  Renetta Albrecht Cait Etienne; 2016 01/18/2016   Final    A  Urine, Unspecified Source, :  Rare cluster of degenerated appearing urothelial cells present; see note  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  Few red blood cells also identified  Satisfactory for evaluation  Image Results:   Image result are reviewed and documented in Hematology/Oncology history    IR thoracentesis  Narrative: Ultrasound-guided thoracentesis    Clinical History: Symptomatic recurrent pleural effusion   Technique: The patient was brought to the interventional radiology area and placed in the sitting position on the side of a stretcher  The left chest was then examined with ultrasound and the skin was marked  The skin was then prepped, and draped in usual sterile fashion  Lidocaine was administered to the skin  Under direct ultrasound guidance, a 5 [employer] Karishma Yueh multisidehole catheter was advanced into the pleural space  2000 mL clear magnolia pleural fluid was   aspirated  The catheter was removed intact, sterile band-aid applied  The patient tolerated the procedure well and suffered no complications  Impression: Impression:  1  Therapeutic Ultrasound-guided left thoracentesis yielding 2000 mL clear magnolia pleural fluid  Signed, performed, and dictated by EVANGELINA Arizmendi under the supervision of Dr Mccullough Phoenix  Workstation performed: [id number]      LABS:  Lab data are reviewed and documented in HemOnc history         Lab Results   Component Value Date    HGB 9 4 (L) 09/15/2022    HCT 29 5 (L) 09/15/2022    MCV 93 09/15/2022     09/15/2022    WBC 12 31 (H) 09/15/2022    NRBC 0 09/15/2022    BANDSPCT 3 02/04/2022    ATYLMPCT 1 (H) 02/04/2022     Lab Results   Component Value Date     09/03/2015    K 4 8 09/15/2022     09/15/2022    CO2 25 09/15/2022    ANIONGAP 5 09/03/2015    BUN 53 (H) 09/15/2022    CREATININE 2 64 (H) 09/15/2022    GLUCOSE 203 (H) 09/03/2015    GLUF 84 08/25/2022    CALCIUM 8 9 09/15/2022    CORRECTEDCA 9 4 09/15/2022    AST 62 (H) 09/15/2022    ALT 51 (H) 09/15/2022    ALKPHOS 102 09/15/2022    PROT 6 4 07/26/2015    BILITOT 0 50 07/26/2015    EGFR 22 09/15/2022       Lab Results   Component Value Date    IRON 186 (H) 12/10/2021    TIBC 377 12/10/2021    FERRITIN 23 12/10/2021    FERRITIN 40 10/13/2021    FERRITIN 31 06/22/2021    FERRITIN 23 02/17/2021    FERRITIN 52 12/19/2020    FERRITIN 73 09/19/2018       Recent Labs     09/15/22  0947   WBC 12 31*     By:  Hernan Blanco DO, 9/16/2022, 10:59 AM

## 2022-09-17 RX ORDER — OXYBUTYNIN CHLORIDE 10 MG/1
TABLET, EXTENDED RELEASE ORAL
Qty: 30 TABLET | Refills: 0 | Status: SHIPPED | OUTPATIENT
Start: 2022-09-17 | End: 2022-10-10

## 2022-09-19 ENCOUNTER — PATIENT OUTREACH (OUTPATIENT)
Dept: CASE MANAGEMENT | Facility: OTHER | Age: 77
End: 2022-09-19

## 2022-09-19 NOTE — PROGRESS NOTES
[facility] received SW referral from Dr Demetris Capps, requesting this writer place a referral for pt to [address] [place] [address]  This writer sent a message to Dr Ammy Damon him that oncology social workers are not able to place rehab referrals   [facility] will close referral

## 2022-09-19 NOTE — TELEPHONE ENCOUNTER
Pt called to confirm appt for tomorrow at [facility]  Advised him that he can follow up at [address] [facility] after this appt if that office is closer  Pt confirmed understanding

## 2022-09-20 ENCOUNTER — CONSULT (OUTPATIENT)
Dept: PULMONOLOGY | Facility: CLINIC | Age: 77
End: 2022-09-20
Payer: MEDICARE

## 2022-09-20 VITALS
WEIGHT: 216 LBS | BODY MASS INDEX: 29.26 KG/M2 | OXYGEN SATURATION: 98 % | HEIGHT: 72 IN | DIASTOLIC BLOOD PRESSURE: 68 MMHG | TEMPERATURE: 97.7 F | HEART RATE: 118 BPM | SYSTOLIC BLOOD PRESSURE: 122 MMHG

## 2022-09-20 DIAGNOSIS — J90 RECURRENT PLEURAL EFFUSION: ICD-10-CM

## 2022-09-20 DIAGNOSIS — C67.3 MALIGNANT NEOPLASM OF ANTERIOR WALL OF URINARY BLADDER (HCC): ICD-10-CM

## 2022-09-20 DIAGNOSIS — Z87.891 HISTORY OF TOBACCO USE DISORDER: ICD-10-CM

## 2022-09-20 DIAGNOSIS — R91.8 PULMONARY NODULES: ICD-10-CM

## 2022-09-20 DIAGNOSIS — R06.02 SHORTNESS OF BREATH: Primary | ICD-10-CM

## 2022-09-20 PROCEDURE — 99205 OFFICE O/P NEW HI 60 MIN: CPT | Performed by: INTERNAL MEDICINE

## 2022-09-20 NOTE — PROGRESS NOTES
Pulmonary Consultation   Susan Hines 76 y o  male MRN: [id number]  9/20/2022      Reason for Consultation:  Shortness of breath    Requested by:  Dr Simon Newman:    Shortness of breath  Patient presents with shortness of breath over the last month  Images reviewed and appears to be due to recurrent pleural effusion given his symptom relief with thoracentesis  Will plan for tunneled pleural catheter placement due to reaccumulation     Recurrent pleural effusion  Has had thoracentesis x 2, yielding 1 5L and 2L of pleural fluid  Pleural fluid studies show lymphocyte predominant, exudative effusion  Discussed placing tunneled pleural catheter with patient and son, explained risks and benefits  Given patient had improvement of his symptoms after thoracentesis, it would be beneficial to have catheter placed  Patient is agreeable, consent was signed    Orders for procedure placed for 9/22 at 1pm at [facility], patient called and message left on voicemail to confirm appt  Patient advised to call back if significant symptoms develop    Malignant neoplasm of anterior wall of urinary bladder ([address] )  Currently undergoing chemotherapy with Enfortumab with [facility] oncology, started on 09/16/2022  To have repeat CT chest/abd/pelvis for restaging 11/2022    Pulmonary nodules  Stable RML nodule (3mm) seen on CT Chest in April 2022  7mm RML nodule, 1cm NYA nodule and 4mm LLL nodule have increased in size or are new from prior scan as of Aug 2022  Likely metastatic from bladder wall CA  Will obtain fluid studies when pleural catheter is placed to test for malignancy  Prior pleural fluid studies are negative for malignancy    History of tobacco use disorder  Patient maintains cessation since 1970  Encourage continued cessation      Plan:    Problem List Items Addressed This Visit        Respiratory    Recurrent pleural effusion     Has had thoracentesis x 2, yielding 1 5L and 2L of pleural fluid  Pleural fluid studies show lymphocyte predominant, exudative effusion  Discussed placing tunneled pleural catheter with patient and son, explained risks and benefits  Given patient had improvement of his symptoms after thoracentesis, it would be beneficial to have catheter placed  Patient is agreeable, consent was signed  Orders for procedure placed for 9/22 at 1pm at [place], patient called and message left on voicemail to confirm appt  Patient advised to call back if significant symptoms develop         Relevant Orders    Pleural catheter insertion       Genitourinary    Malignant neoplasm of anterior wall of urinary bladder ([address] )     Currently undergoing chemotherapy with Enfortumab with [facility], started on 09/16/2022  To have repeat CT chest/abd/pelvis for restaging 11/2022            Other    Pulmonary nodules     Stable RML nodule (3mm) seen on CT Chest in April 2022  7mm RML nodule, 1cm NYA nodule and 4mm LLL nodule have increased in size or are new from prior scan as of Aug 2022  Likely metastatic from bladder wall CA  Will obtain fluid studies when pleural catheter is placed to test for malignancy  Prior pleural fluid studies are negative for malignancy         Shortness of breath - Primary     Patient presents with shortness of breath over the last month  Images reviewed and appears to be due to recurrent pleural effusion given his symptom relief with thoracentesis  Will plan for tunneled pleural catheter placement due to reaccumulation          Relevant Orders    Pleural catheter insertion    History of tobacco use disorder     Patient maintains cessation since 1970  Encourage continued cessation               History of Present Illness   HPI:  Yamilex Banks is a 76 y o  male who presents with complaints of shortness of breath  Patient has medical history of stage IV anterior wall urothelial carcinoma  He is currently being followed by [facility]    He has started a new chemotherapy on 09/16/2022  He was noted to have lung nodules on imaging in April 2022, these have progressed with increase in size in new nodules based on imaging done August 2022  Over the last month patient has noticed he has become progressively short of breath  He states it occurs at rest or with little exertion, associated with left sided rib pain  Of note patient did have a nuclear med bone scan which was negative for bony metastases  He denies any episodes of hypoxia  Not associated with changes in position or new medications  He has not tried any inhalers or medications to relieve his symptoms  He had a CT chest August 2022 showed a large left pleural effusion, new pulmonary nodules and retroperitoneal lymphadenopathy concerning for metastatic disease  He underwent thoracentesis, yielding 1 5 L of pleural fluid and this was sent for cytology and labs  Studies showed a lymphocyte-predominant exudative effusion  Patient states he had some relief after thoracentesis, lasting about a week  Then fluid had reaccumulated and he underwent IR thoracentesis which yielded 2 L of pleural fluid  He states this provided some relief and lasted up until 9/19  He denies any exacerbating or relieving factors, except for thoracentesis  He denies any recent infections, hospitalizations, sick contacts, fevers, chills, cough, mucus production  Review of Systems   Constitutional: Negative for activity change, chills, diaphoresis, fatigue and fever  HENT: Negative for congestion, postnasal drip, rhinorrhea, sinus pressure, sinus pain and sore throat  Eyes: Negative  Respiratory: Positive for shortness of breath  Negative for cough and chest tightness  Cardiovascular: Positive for chest pain  Negative for palpitations and leg swelling  Gastrointestinal: Negative for abdominal distention, abdominal pain, constipation, diarrhea, nausea and vomiting  Endocrine: Negative  Genitourinary: Negative      Musculoskeletal: Negative for back pain, gait problem and neck pain  Skin: Negative  Allergic/Immunologic: Negative  Neurological: Negative for dizziness, syncope, weakness, light-headedness and headaches  Hematological: Negative  Psychiatric/Behavioral: Negative  All other systems reviewed and are negative  Historical Information   Past Medical History:   Diagnosis Date    Anemia     Last assessed: 9/28/17    Anxiety     Arteriosclerotic cardiovascular disease     Last assessed: 9/28/17    Arthritis     Bladder cancer ([address] )     bladder- had BCG treatments    Chronic kidney disease     Stage IV    CKD (chronic kidney disease) stage 4, GFR 15-29 ml/min ([facility]     Colon polyp     Coronary artery disease     7 stents    Depression     Diabetes mellitus ([facility])     IDDM    GERD (gastroesophageal reflux disease)     Glaucoma     Hematuria     History of fusion of cervical spine     Hyperlipidemia     Hypertension     Insomnia     Last assessed: 11/14/12    Loss of hearing     has hearing aids but usually does not wear them    Other seasonal allergic rhinitis     Last assessed: 2/10/16    PAD (peripheral artery disease) ([facility])     Shortness of breath     on exertion    Spinal stenosis of lumbar region     Transient cerebral ischemia     No Residual    Uses walker     w/c for longer distances     Past Surgical History:   Procedure Laterality Date    CARDIAC SURGERY      Cath stent placement  Last assessed: 3/9/17  Interventional Catheterization    CHOLECYSTECTOMY      COLONOSCOPY      CYSTOSCOPY      Diagnostic w/biopsy  Alcira Oliver  Last assessed: 12/1/14    CYSTOSCOPY N/A 4/12/2022    Procedure: CYSTOSCOPY  Bladder biopsies  ;  Surgeon: Carolina Bradley MD;  [facility];  Service: Urology    CYSTOSCOPY W/ RETROGRADES Right 3/1/2022    Procedure: CYSTO; stent removal retrograde;  Surgeon: Carolina Bradley MD;  [facility];  Service: Urology    [address] [place] [address] PLACEMENT Bilateral 10/18/2021    Procedure: bilateral retrogrades, cytology collection;  Surgeon: Mayda Ndiaye MD;  [facility];  Service: Urology    CYSTOURETHROSCOPY      w/cautery  Heather Torres    FL RETROGRADE PYELOGRAM  10/18/2021    FL RETROGRADE PYELOGRAM  10/24/2021    GASTRIC BYPASS      For morbid obesity w/Shaji-en-Y   Resolved: 11/17/09    INCISION AND DRAINAGE OF WOUND Right 2/26/2017    Procedure: INCISION AND DRAINAGE (I&D) EXTREMITY WITH APPLICATION OF GRAFT JACKET;  Surgeon: Jyoti Liu DPM;  [facility];  Service:     INCISION AND DRAINAGE OF WOUND Right 4/25/2017    Procedure: INCISION AND DRAINAGE (I&D) EXTREMITY, APPLICATION OF GRAFT;  Surgeon: Jyoti Liu DPM;  [facility];  Service:     IR BIOPSY OTHER  7/2/2020    IR LOWER EXTREMITY ANGIOGRAM  2/8/2021    IR LOWER EXTREMITY ANGIOGRAM  2/11/2021    IR NEPHROSTOMY TUBE CHECK/CHANGE/REPOSITION/REINSERTION/UPSIZE  4/28/2022    IR NEPHROSTOMY TUBE CHECK/CHANGE/REPOSITION/REINSERTION/UPSIZE  5/24/2022    IR NEPHROSTOMY TUBE CHECK/CHANGE/REPOSITION/REINSERTION/UPSIZE  6/7/2022    IR NEPHROSTOMY TUBE CHECK/CHANGE/REPOSITION/REINSERTION/UPSIZE  7/28/2022    IR NEPHROSTOMY TUBE PLACEMENT  2/25/2022    IR PORT PLACEMENT  1/17/2022    IR THORACENTESIS  9/2/2022    IR THORACENTESIS  9/14/2022    IR TUNNELED CENTRAL LINE PLACEMENT  12/24/2020    JOINT REPLACEMENT      christofer knees replaced    MO AMPUTATION METATARSAL+TOE,SINGLE Left 12/21/2020    Procedure: RAY RESECTION FOOT;  Surgeon: Tracey Grier DPM;  [facility];  Service: Podiatry    MO AMPUTATION METATARSAL+TOE,SINGLE Left 12/31/2020    Procedure: 5TH MET RESECTION;  Surgeon: Tracey Grier DPM;  [facility];  Service: Podiatry    MO CYSTOURETHROSCOPY W/IRRIG & EVAC CLOTS N/A 2/10/2021    Procedure: CYSTOSCOPY EVACUATION OF CLOTS, fulguration;  Surgeon: Karely Valles MD;  [facility];  Service: Urology    NJ CYSTOURETHROSCOPY W/IRRIG & EVAC CLOTS N/A 10/24/2021    Procedure: CYSTOSCOPY EVACUATION OF CLOT, fulguration of bleeding vessels, right ureter stent placement, retrograde pyelogram;  Surgeon: Angeles Guajardo MD;  [facility];  Service: Urology    NJ CYSTOURETHROSCOPY,BIOPSY N/A 8/16/2016    Procedure: CYSTOSCOPY WITH BIOPSIES;  Surgeon: Abbey Covington MD;  [facility];  Service: Urology    NJ CYSTOURETHROSCOPY,FULGUR <0 5 CM LESN N/A 11/19/2020    Procedure: CYSTO W/BIOPSIES, transurethral prostate bx;  Surgeon: Garrett Crabtree MD;  [facility];  Service: Urology    NJ CYSTOURETHROSCOPY,FULGUR >5 CM LESN Bilateral 10/18/2021    Procedure: TRANSURETHRAL RESECTION OF BLADDER TUMOR (TURBT);   Surgeon: Crys Farley MD;  [facility];  Service: Urology    NJ Ahmet Brochure 3RD+ ORD [address] PEL[facility] Left 2/8/2021    Procedure: LEG angiogram, CO2 w/limited contrast with balloon angioplasty postertior tibial artery;  Surgeon: Emir Marie MD;  [facility];  Service: Vascular    ROTATOR CUFF REPAIR      SMALL INTESTINE SURGERY      Surgery Shaji-en-Y    SPINAL FUSION      lumbar and cervical fusions    VAC DRESSING APPLICATION Right 4/68/8387    Procedure: APPLICATION VAC DRESSING;  Surgeon: Zaina Horan DPM;  [facility];  Service:     WOUND DEBRIDEMENT Left 2/16/2021    Procedure: FOOT DEBRIDE, [address] OUT w/graft application;  Surgeon: Zaina Horan DPM;  [facility];  Service: Podiatry     Family History   Problem Relation Age of Onset    Diabetes Mother     Heart disease Mother     Other Mother         High blood pressure    Heart disease Father     Diabetes Sister     Other Sister         High blood pressure    Kidney disease Sister     Heart disease Brother     Other Brother         High blood pressure       Occupational History:  Retired     Social History:   Alcohol:  None  Smoking:  Smoked about 3 packs per day for 27 years, quit in 0304  Illicit drugs:  None  Home heating system: forced hot air, gas  Pets:  None  Hobbies: golf    Meds/Allergies     Current Outpatient Medications:     Accu-Chek Softclix Lancets lancets, Test blood sugar 4 times daily, Disp: 200 each, Rfl: 0    acetaminophen (TYLENOL) 325 mg tablet, Take 650 mg by mouth every 6 (six) hours as needed for mild pain  , Disp: , Rfl:     ALPRAZolam (XANAX) 0 25 mg tablet, At twice a day as needed for anxiety, Disp: 30 tablet, Rfl: 0    ARIPiprazole (ABILIFY) 2 mg tablet, Take 1 tablet (2 mg total) by mouth daily, Disp: 30 tablet, Rfl: 0    aspirin (ECOTRIN LOW STRENGTH) 81 mg EC tablet, Take 1 tablet (81 mg total) by mouth daily, Disp: , Rfl:     Azelastine HCl 0 15 % SOLN, Inhale 1 spray 2 (two) times a day, Disp: 30 mL, Rfl: 3    Bimatoprost (LUMIGAN OP), Apply 1 drop to eye daily at bedtime , Disp: , Rfl:     calcitriol (ROCALTROL) 0 25 mcg capsule, Take 1 capsule (0 25 mcg total) by mouth daily, Disp: 90 capsule, Rfl: 0    docusate sodium (COLACE) 100 mg capsule, TAKE ONE CAPSULE BY MOUTH TWICE A DAY, Disp: 100 capsule, Rfl: 0    DULoxetine (CYMBALTA) 30 mg delayed release capsule, TAKE ONE CAPSULE BY MOUTH EVERY DAY, Disp: 90 capsule, Rfl: 0    ezetimibe (ZETIA) 10 mg tablet, TAKE ONE TABLET BY MOUTH ONCE DAILY IN THE EARLY MORNING, Disp: 90 tablet, Rfl: 0    Ferrous Sulfate Dried (Feosol) 200 (65 Fe) MG TABS, Take 65 mg by mouth daily, Disp: 90 tablet, Rfl: 0    fluocinonide (LIDEX) 0 05 % cream, Apply topically 2 (two) times a day (Patient taking differently: Apply topically as needed), Disp: 30 g, Rfl: 0    fluticasone (FLONASE) 50 mcg/act nasal spray, 1 spray into each nostril daily (Patient taking differently: 1 spray into each nostril as needed), Disp: 11 mL, Rfl: 1    furosemide (LASIX) 20 mg tablet, Take 1 tablet (20 mg total) by mouth daily, Disp: 90 tablet, Rfl: 1    gabapentin (Neurontin) 300 mg capsule, Take 1 capsule (300 mg total) by mouth daily at bedtime, Disp: 90 capsule, Rfl: 0    glucose blood (Accu-Chek Martha Plus) test strip, Test blood sugar 4 times daily, Disp: 200 strip, Rfl: 0    Insulin Pen Needle 31G X 8 MM MISC, Inject 3 times a day, Disp: 100 each, Rfl: 2    isosorbide mononitrate (IMDUR) 30 mg 24 hr tablet, Take 1 tablet (30 mg total) by mouth daily in the early morning, Disp: 90 tablet, Rfl: 0    Lantus SoloStar 100 units/mL injection pen, 18 units qhs (Patient taking differently: Inject 18 Units under the skin daily at bedtime), Disp: 30 mL, Rfl: 1    lidocaine (XYLOCAINE) 2 % topical gel, Apply topically as needed for mild pain, Disp: 30 mL, Rfl: 0    lidocaine-prilocaine (EMLA) cream, Apply topically as needed for mild pain, Disp: 30 g, Rfl: 0    loperamide (IMODIUM) 2 mg capsule, Take 2 mg by mouth 4 (four) times a day as needed for diarrhea, Disp: , Rfl:     metoprolol succinate (TOPROL-XL) 100 mg 24 hr tablet, TAKE ONE TABLET BY MOUTH ONCE DAILY, Disp: 90 tablet, Rfl: 0    multivitamin (THERAGRAN) TABS, Take 1 tablet by mouth daily  , Disp: , Rfl:     nitrofurantoin (MACROBID) 100 mg capsule, Take 1 capsule (100 mg total) by mouth 2 (two) times a day, Disp: 10 capsule, Rfl: 0    NovoLOG FlexPen 100 units/mL injection pen, 10 units with each meal  (Patient taking differently: Inject 10 Units under the skin 3 (three) times a day with meals), Disp: 5 pen, Rfl: 1    omeprazole (PriLOSEC) 20 mg delayed release capsule, Take 20 mg by mouth daily in the early morning PRN , Disp: , Rfl:     ondansetron (Zofran ODT) 8 mg disintegrating tablet, Take 1 tablet (8 mg total) by mouth every 8 (eight) hours as needed for nausea or vomiting, Disp: 20 tablet, Rfl: 0    oxybutynin (DITROPAN-XL) 10 MG 24 hr tablet, TAKE ONE TABLET BY MOUTH EVERY DAY, Disp: 30 tablet, Rfl: 0    oxyCODONE (OxyCONTIN) 10 mg 12 hr tablet, Take 1 tablet (10 mg total) by mouth daily at bedtime Max Daily Amount: 10 mg, Disp: 90 tablet, Rfl: 0    oxyCODONE (Roxicodone) 5 immediate release tablet, Take 1 tablet (5 mg total) by mouth every 4 (four) hours as needed for moderate pain Max Daily Amount: 30 mg, Disp: 60 tablet, Rfl: 0    Patiromer Sorbitex Calcium 8 4 g PACK, Take 8 4 packets by mouth, Disp: , Rfl:     phenazopyridine (PYRIDIUM) 200 mg tablet, Take 1 tablet (200 mg total) by mouth 3 (three) times a day with meals, Disp: 10 tablet, Rfl: 0    predniSONE 20 mg tablet, Take 1 tablet (20 mg total) by mouth daily, Disp: 5 tablet, Rfl: 0    senna (SENOKOT) 8 6 MG tablet, Take 1 tablet (8 6 mg total) by mouth daily, Disp: 30 tablet, Rfl:     sodium chloride, PF, 0 9 %, 10 mL by Intracatheter route daily Bilateral PCNs to be flushed daily with prefilled 10cc NS, Disp: 600 mL, Rfl: 3    naloxone (NARCAN) 4 mg/0 1 mL nasal spray, Administer 1 spray into a nostril  If no response after 2-3 minutes, give another dose in the other nostril using a new spray  (Patient not taking: Reported on 9/20/2022), Disp: 1 each, Rfl: 1    sodium chloride, PF, 0 9 %, 10 mL by Intracatheter route daily Intracatheter flushing daily, Disp: 900 mL, Rfl: 0    sodium chloride, PF, 0 9 %, 10 mL by Intracatheter route daily Intracatheter flushing daily, Disp: 900 mL, Rfl: 0  Allergies   Allergen Reactions    Atorvastatin Hives, Itching and Rash    Simvastatin Rash and Edema     Edema of lower legs    Statins Hives and Itching    Insulin Lispro Swelling and Edema     " Lower Legs"    Other Itching, Rash and Other (See Comments)     "EKG Patches"   "blue EKG patches"       Vitals: Blood pressure 122/68, pulse (!) 118, temperature 97 7 °F (36 5 °C), height 6' (1 829 m), weight 98 kg (216 lb), SpO2 98 %  , Body mass index is 29 29 kg/m²  Oxygen Therapy  SpO2: 98 %    Physical Exam  Physical Exam  Vitals and nursing note reviewed  Constitutional:       Appearance: He is normal weight        Comments: Frail, chronically ill-appearing   HENT:      Head: Normocephalic and atraumatic  Right Ear: External ear normal       Left Ear: External ear normal       Nose: Nose normal       Mouth/Throat:      Mouth: Mucous membranes are moist       Pharynx: Oropharynx is clear  Eyes:      Conjunctiva/sclera: Conjunctivae normal    Cardiovascular:      Rate and Rhythm: Normal rate and regular rhythm  Pulses: Normal pulses  Heart sounds: Normal heart sounds  Pulmonary:      Effort: Pulmonary effort is normal       Comments: Diminished breath sounds of lower 2/3 of left lung  Abdominal:      General: Abdomen is flat  Bowel sounds are normal       Palpations: Abdomen is soft  Genitourinary:     Comments: Bilateral nephrostomy tubes  Musculoskeletal:         General: No swelling or tenderness  Cervical back: Neck supple  No muscular tenderness  Skin:     General: Skin is warm and dry  Capillary Refill: Capillary refill takes less than 2 seconds  Neurological:      Mental Status: He is alert and oriented to person, place, and time  Mental status is at baseline  Psychiatric:         Mood and Affect: Mood normal          Behavior: Behavior normal          Thought Content: Thought content normal          Judgment: Judgment normal          Labs: I have personally reviewed pertinent lab results    Lab Results   Component Value Date    WBC 12 31 (H) 09/15/2022    HGB 9 4 (L) 09/15/2022    HCT 29 5 (L) 09/15/2022    MCV 93 09/15/2022     09/15/2022     Lab Results   Component Value Date    GLUCOSE 203 (H) 09/03/2015    CALCIUM 8 9 09/15/2022     09/03/2015    K 4 8 09/15/2022    CO2 25 09/15/2022     09/15/2022    BUN 53 (H) 09/15/2022    CREATININE 2 64 (H) 09/15/2022     No results found for: IGE  Lab Results   Component Value Date    ALT 51 (H) 09/15/2022    AST 62 (H) 09/15/2022    GGT 57 07/28/2021    ALKPHOS 102 09/15/2022    BILITOT 0 50 07/26/2015     Pleural fluid studies significant for:   50% lymphocytes;   benign mesothelial cells, lymphocytes and histiocytes, negative for malignancy; pleural fluid protein 5 5, pleural fluid , glucose 121    Imaging and other studies: I have personally reviewed pertinent films in PACS  CT chest/abd/pelvis 08/26/22: new moderate sized L sided pleural effusion  Multiple pulmonary nodules with suspicion for metastasis  CT chest/abd/pelvis 06/03/2022: new RML and lingula nodules and stable 3mm RML nodule, worsening wall thickening of bladder  CT Chest 04/24/2022: stable RML nodule 3mm  Pulmonary function testing: never had    EKG, Pathology, and Other Studies: I have personally reviewed pertinent reports      Dobutamine stress Echo 12/02/2021: EF of 60-65% with normal systolic function  EKG 53/84/7953: NSR with nonspecific ST changes    Emiliana Townsend MD  Pulmonary/Critical Care Fellowship PGY-IV  [facility]

## 2022-09-20 NOTE — ASSESSMENT & PLAN NOTE
Stable RML nodule (3mm) seen on CT Chest in April 2022  7mm RML nodule, 1cm NYA nodule and 4mm LLL nodule have increased in size or are new from prior scan as of Aug 2022  Likely metastatic from bladder wall CA  Will obtain fluid studies when pleural catheter is placed to test for malignancy  Prior pleural fluid studies are negative for malignancy

## 2022-09-20 NOTE — ASSESSMENT & PLAN NOTE
Currently undergoing chemotherapy with Enfortumab with [facility], started on 09/16/2022  To have repeat CT chest/abd/pelvis for restaging 11/2022

## 2022-09-20 NOTE — ASSESSMENT & PLAN NOTE
Has had thoracentesis x 2, yielding 1 5L and 2L of pleural fluid  Pleural fluid studies show lymphocyte predominant, exudative effusion  Discussed placing tunneled pleural catheter with patient and son, explained risks and benefits  Given patient had improvement of his symptoms after thoracentesis, it would be beneficial to have catheter placed  Patient is agreeable, consent was signed    Orders for procedure placed for 9/22 at 1pm at [place], patient called and message left on voicemail to confirm appt  Patient advised to call back if significant symptoms develop

## 2022-09-20 NOTE — ASSESSMENT & PLAN NOTE
Patient presents with shortness of breath over the last month    Images reviewed and appears to be due to recurrent pleural effusion given his symptom relief with thoracentesis  Will plan for tunneled pleural catheter placement due to reaccumulation

## 2022-09-22 ENCOUNTER — LAB REQUISITION (OUTPATIENT)
Dept: LAB | Facility: HOSPITAL | Age: 77
End: 2022-09-22
Payer: MEDICARE

## 2022-09-22 ENCOUNTER — HOSPITAL ENCOUNTER (OUTPATIENT)
Dept: GASTROENTEROLOGY | Facility: HOSPITAL | Age: 77
End: 2022-09-22
Attending: INTERNAL MEDICINE
Payer: MEDICARE

## 2022-09-22 ENCOUNTER — HOSPITAL ENCOUNTER (OUTPATIENT)
Dept: INFUSION CENTER | Facility: HOSPITAL | Age: 77
Discharge: HOME | End: 2022-09-22
Payer: MEDICARE

## 2022-09-22 ENCOUNTER — HOSPITAL ENCOUNTER (OUTPATIENT)
Dept: RADIOLOGY | Facility: HOSPITAL | Age: 77
Discharge: HOME | End: 2022-09-22

## 2022-09-22 ENCOUNTER — RA CDI HCC (OUTPATIENT)
Dept: OTHER | Facility: HOSPITAL | Age: 77
End: 2022-09-22

## 2022-09-22 ENCOUNTER — TELEPHONE (OUTPATIENT)
Dept: PULMONOLOGY | Facility: CLINIC | Age: 77
End: 2022-09-22

## 2022-09-22 VITALS
TEMPERATURE: 98.5 F | DIASTOLIC BLOOD PRESSURE: 59 MMHG | RESPIRATION RATE: 20 BRPM | BODY MASS INDEX: 29.26 KG/M2 | HEIGHT: 72 IN | OXYGEN SATURATION: 96 % | WEIGHT: 216 LBS | HEART RATE: 72 BPM | SYSTOLIC BLOOD PRESSURE: 108 MMHG

## 2022-09-22 DIAGNOSIS — C67.3 MALIGNANT NEOPLASM OF ANTERIOR WALL OF URINARY BLADDER (HCC): Primary | ICD-10-CM

## 2022-09-22 DIAGNOSIS — R06.02 SHORTNESS OF BREATH: ICD-10-CM

## 2022-09-22 DIAGNOSIS — N18.4 CHRONIC KIDNEY DISEASE, STAGE 4 (SEVERE) (HCC): ICD-10-CM

## 2022-09-22 DIAGNOSIS — J90 RECURRENT PLEURAL EFFUSION: ICD-10-CM

## 2022-09-22 DIAGNOSIS — Z95.828 PORT-A-CATH IN PLACE: ICD-10-CM

## 2022-09-22 DIAGNOSIS — C67.9 BLADDER CARCINOMA (HCC): Primary | ICD-10-CM

## 2022-09-22 LAB
25(OH)D3 SERPL-MCNC: 33.8 NG/ML (ref 30–100)
ALBUMIN SERPL BCP-MCNC: 3.4 G/DL (ref 3.5–5)
ALP SERPL-CCNC: 119 U/L (ref 43–122)
ALT SERPL W P-5'-P-CCNC: 45 U/L
ANION GAP SERPL CALCULATED.3IONS-SCNC: 8 MMOL/L (ref 5–14)
AST SERPL W P-5'-P-CCNC: 102 U/L (ref 17–59)
BASOPHILS # BLD AUTO: 0.06 THOUSANDS/ΜL (ref 0–0.1)
BASOPHILS NFR BLD AUTO: 1 % (ref 0–1)
BILIRUB SERPL-MCNC: 0.4 MG/DL (ref 0.2–1)
BUN SERPL-MCNC: 55 MG/DL (ref 5–25)
CALCIUM ALBUM COR SERPL-MCNC: 9 MG/DL (ref 8.3–10.1)
CALCIUM SERPL-MCNC: 8.5 MG/DL (ref 8.4–10.2)
CHLORIDE SERPL-SCNC: 104 MMOL/L (ref 96–108)
CO2 SERPL-SCNC: 24 MMOL/L (ref 21–32)
CREAT SERPL-MCNC: 3.03 MG/DL (ref 0.7–1.5)
EOSINOPHIL # BLD AUTO: 0.37 THOUSAND/ΜL (ref 0–0.61)
EOSINOPHIL NFR BLD AUTO: 4 % (ref 0–6)
ERYTHROCYTE [DISTWIDTH] IN BLOOD BY AUTOMATED COUNT: 14.1 % (ref 11.6–15.1)
GFR SERPL CREATININE-BSD FRML MDRD: 18 ML/MIN/1.73SQ M
GLUCOSE SERPL-MCNC: 93 MG/DL (ref 70–99)
HCT VFR BLD AUTO: 29 % (ref 36.5–49.3)
HGB BLD-MCNC: 9.4 G/DL (ref 12–17)
IMM GRANULOCYTES # BLD AUTO: 0.04 THOUSAND/UL (ref 0–0.2)
IMM GRANULOCYTES NFR BLD AUTO: 0 % (ref 0–2)
LYMPHOCYTES # BLD AUTO: 1.39 THOUSANDS/ΜL (ref 0.6–4.47)
LYMPHOCYTES NFR BLD AUTO: 15 % (ref 14–44)
MAGNESIUM SERPL-MCNC: 2 MG/DL (ref 1.6–2.3)
MCH RBC QN AUTO: 29.7 PG (ref 26.8–34.3)
MCHC RBC AUTO-ENTMCNC: 32.4 G/DL (ref 31.4–37.4)
MCV RBC AUTO: 92 FL (ref 82–98)
MONOCYTES # BLD AUTO: 0.96 THOUSAND/ΜL (ref 0.17–1.22)
MONOCYTES NFR BLD AUTO: 10 % (ref 4–12)
NEUTROPHILS # BLD AUTO: 6.39 THOUSANDS/ΜL (ref 1.85–7.62)
NEUTS SEG NFR BLD AUTO: 70 % (ref 43–75)
NRBC BLD AUTO-RTO: 0 /100 WBCS
PHOSPHATE SERPL-MCNC: 4.3 MG/DL (ref 2.5–4.8)
PLATELET # BLD AUTO: 261 THOUSANDS/UL (ref 149–390)
PMV BLD AUTO: 9.8 FL (ref 8.9–12.7)
POTASSIUM SERPL-SCNC: 4.6 MMOL/L (ref 3.5–5.3)
PROT SERPL-MCNC: 7.2 G/DL (ref 6.4–8.4)
PTH-INTACT SERPL-MCNC: 216.7 PG/ML (ref 18.4–80.1)
RBC # BLD AUTO: 3.16 MILLION/UL (ref 3.88–5.62)
SODIUM SERPL-SCNC: 136 MMOL/L (ref 135–147)
URATE SERPL-MCNC: 8.6 MG/DL (ref 3.5–8.5)
WBC # BLD AUTO: 9.21 THOUSAND/UL (ref 4.31–10.16)

## 2022-09-22 PROCEDURE — 80053 COMPREHEN METABOLIC PANEL: CPT | Performed by: INTERNAL MEDICINE

## 2022-09-22 PROCEDURE — 88342 IMHCHEM/IMCYTCHM 1ST ANTB: CPT | Performed by: PATHOLOGY

## 2022-09-22 PROCEDURE — 71045 X-RAY EXAM CHEST 1 VIEW: CPT

## 2022-09-22 PROCEDURE — 88341 IMHCHEM/IMCYTCHM EA ADD ANTB: CPT | Performed by: PATHOLOGY

## 2022-09-22 PROCEDURE — 82306 VITAMIN D 25 HYDROXY: CPT | Performed by: INTERNAL MEDICINE

## 2022-09-22 PROCEDURE — 84100 ASSAY OF PHOSPHORUS: CPT | Performed by: INTERNAL MEDICINE

## 2022-09-22 PROCEDURE — 85025 COMPLETE CBC W/AUTO DIFF WBC: CPT | Performed by: INTERNAL MEDICINE

## 2022-09-22 PROCEDURE — 88112 CYTOPATH CELL ENHANCE TECH: CPT | Performed by: PATHOLOGY

## 2022-09-22 PROCEDURE — C1729 CATH, DRAINAGE: HCPCS

## 2022-09-22 PROCEDURE — 84550 ASSAY OF BLOOD/URIC ACID: CPT | Performed by: INTERNAL MEDICINE

## 2022-09-22 PROCEDURE — 93503 INSERT/PLACE HEART CATHETER: CPT | Performed by: INTERNAL MEDICINE

## 2022-09-22 PROCEDURE — 83735 ASSAY OF MAGNESIUM: CPT | Performed by: INTERNAL MEDICINE

## 2022-09-22 PROCEDURE — 83970 ASSAY OF PARATHORMONE: CPT | Performed by: INTERNAL MEDICINE

## 2022-09-22 PROCEDURE — 88305 TISSUE EXAM BY PATHOLOGIST: CPT | Performed by: PATHOLOGY

## 2022-09-22 NOTE — DISCHARGE INSTRUCTIONS
Instructions for drainage:    Drain and change dressing every other day starting Monday 9/26/22  Continue draining every other day until 3 consecutive draining below 100 mL of fluid  Call pulmonary office for drainage instructions once fluid below 100 mL of fluid on 3 consecutive days  Appointment scheduled for Monday 9/26/22 at 12:00 p m   With Dr Abhay Trujillo for pleural fluid drainage at [place] [facility]    Appointment scheduled Thursday 9/29/22 at 12:00 p m  with Dr Abhay Trujillo for suture removal at [facility] [facility]

## 2022-09-22 NOTE — PROCEDURES
Pleural catheter insertion    Date/Time: 9/22/2022 2:45 PM  Performed by: Casey Garduno DO  Authorized by: Joslyn Reyes MD     Patient Location: GI procedure room    Other Assisting Provider: Yes (comment) (Dr Inocencio Senior)      Universal Protocol:  Consent obtained: written and verbal  Consent given by: patient  Patient states understanding of procedure being performed: yes    Patient's understanding of procedure matches consent: yes    Patient's consent matches procedures scheduled: yes    Relevant documents present and verified: yes    Test results available and properly labeled: yes    Site/side marked: yes    Imaging studies available: yes    Ultrasound guidance: yes    Ultrasound image availability: images available in PACS  Patient identity confirmed: verbally with patient, arm band and provided demographic data  Time out: Immediately prior to the procedure a time out was called      Procedure Details:     PRE OPERATIVE DIAGNOSIS:    Malignant Pleural Effusion - left    LOCATION:   Left  Hemithorax    Consent: Informed consent was obtained  Risks of the procedure were discussed including, but not limited to: Infection, Bleeding, Pain, Pneumothorax and Injury to surrounding structures  Images reviewed prior to the procedure  Final Time Out was performed  Left  hemithorax evaluated with bedside ultrasound, fluid pocked identified and appropriate for TPC insertion  ASA: 3      Analgesia: Subcutaneous Lidocaine 10 cc  Was other anesthesia used? No     PROCEDURE:  The patient was placed on right lateral decubitus lateral decubitus position  Using US guidance, pleural fluid pocket was successfully identified  Site marked on skin  Site was prepped and draped in sterile fashion  SQ tissue infiltrated with 2% lidocaine with epi  The finder needle was advanced over rib - 5th, mid axillary line  Pleural fluid obtained successfully  Finder needle was removed   The introducer needle was then advanced in similar location to finder and flash of pleural fluid obtained  Wire was easily advanced and introducer needle was removed  A 1cm skin incision at wire entry site  An additional 1cm incision was made approximately 4cm anterior to the wire insertion site  Using a trochar the catheter was tunneled under the skin from anterior to posterior incision sites  The cuff was seated approximately 1cm beyond insertion site  The pleural insertion tract was then serially dilated, and the dilatator/breakaway sheath was inserted  The catheter was then inserted thru the breakaway sheath, as the sheath was removed  The catheter was fully seated under the skin  Using tunneled pleural catheter bottles 1600 cc was drained  Catheter was sutured in place - 2 suture(s) at wire insertion site and 1 at catheter insertion site  Sterile dressing was then placed  Post procedure Chest X-ray was reviewed and showed Adequate catheter placement, without any apparent complications  COMPLICATIONS:  None    ESTIMATED BLOOD LOSS:  None    RECOMMENDATIONS:   Drainage is recommended every other day until output is less than 100cc for three consecutive drainage attempts  Suture removal will be arranged in 7 days

## 2022-09-22 NOTE — TELEPHONE ENCOUNTER
Patient needs to go to another home Care Agency the one that was set up is  All closed out at this time not taking any more Appt's as of today wont be able to start this for Saturday - Ayden Garcia from [address] [place] [address]  Ext [phone]

## 2022-09-22 NOTE — PROGRESS NOTES
Port flushed per protocol and central labs drawn per orders    Confirmed appt back tomorrow for chemo

## 2022-09-22 NOTE — PROGRESS NOTES
[facility] [address]  coding opportunities        [id number] and E11 [id number]  Chart Reviewed number of suggestions sent to Provider: 2     Patients Insurance     Medicare Insurance: Medicare

## 2022-09-23 ENCOUNTER — HOSPITAL ENCOUNTER (OUTPATIENT)
Dept: INFUSION CENTER | Facility: HOSPITAL | Age: 77
End: 2022-09-23
Attending: INTERNAL MEDICINE
Payer: MEDICARE

## 2022-09-23 VITALS
HEIGHT: 72 IN | SYSTOLIC BLOOD PRESSURE: 140 MMHG | DIASTOLIC BLOOD PRESSURE: 74 MMHG | BODY MASS INDEX: 29.14 KG/M2 | HEART RATE: 72 BPM | RESPIRATION RATE: 18 BRPM | WEIGHT: 215.17 LBS | TEMPERATURE: 96.1 F

## 2022-09-23 DIAGNOSIS — C67.3 MALIGNANT NEOPLASM OF ANTERIOR WALL OF URINARY BLADDER (HCC): Primary | ICD-10-CM

## 2022-09-23 PROCEDURE — 96367 TX/PROPH/DG ADDL SEQ IV INF: CPT

## 2022-09-23 PROCEDURE — 96413 CHEMO IV INFUSION 1 HR: CPT

## 2022-09-23 RX ORDER — SODIUM CHLORIDE 9 MG/ML
20 INJECTION, SOLUTION INTRAVENOUS ONCE
Status: COMPLETED | OUTPATIENT
Start: 2022-09-23 | End: 2022-09-23

## 2022-09-23 RX ADMIN — SODIUM CHLORIDE 20 ML/HR: 0.9 INJECTION, SOLUTION INTRAVENOUS at 10:37

## 2022-09-23 RX ADMIN — ENFORTUMAB VEDOTIN 120 MG: 30 INJECTION, POWDER, LYOPHILIZED, FOR SOLUTION INTRAVENOUS at 11:20

## 2022-09-23 RX ADMIN — DEXAMETHASONE SODIUM PHOSPHATE: 10 INJECTION, SOLUTION INTRAMUSCULAR; INTRAVENOUS at 10:39

## 2022-09-23 NOTE — TELEPHONE ENCOUNTER
[facility]  She informed me that they are not excepting new pt's at the moment  She said we can reach out on Monday afternoon to see if the have opened for new pt's  She also referred to other agencies in the event they ae still not excepting pt's  PERLA HERNANDEZ [phone]  Yusuf kimberly [phone]  As per Dr Dorenda Angelucci and Dr Jamin Scott Pt is ok to wait until Monday that he is scheduled to go to the [place] office for drainage  If pt begins to experience dyspnea or any other complications he is to go to the ED for Drainage   I called the pt and left him a VM informing him of everything

## 2022-09-23 NOTE — PROGRESS NOTES
Pt tolerated treatment today with no adverse reactions  AVS provided, next apt confirmed  Left unit ambulatory with a steady gait

## 2022-09-26 ENCOUNTER — SOCIAL WORK (OUTPATIENT)
Dept: PALLIATIVE MEDICINE | Facility: CLINIC | Age: 77
End: 2022-09-26

## 2022-09-26 ENCOUNTER — OFFICE VISIT (OUTPATIENT)
Dept: PULMONOLOGY | Facility: CLINIC | Age: 77
End: 2022-09-26

## 2022-09-26 ENCOUNTER — OFFICE VISIT (OUTPATIENT)
Dept: PALLIATIVE MEDICINE | Facility: CLINIC | Age: 77
End: 2022-09-26
Payer: MEDICARE

## 2022-09-26 ENCOUNTER — DOCUMENTATION (OUTPATIENT)
Dept: PULMONOLOGY | Facility: CLINIC | Age: 77
End: 2022-09-26

## 2022-09-26 VITALS
TEMPERATURE: 97.3 F | BODY MASS INDEX: 26.73 KG/M2 | SYSTOLIC BLOOD PRESSURE: 142 MMHG | DIASTOLIC BLOOD PRESSURE: 80 MMHG | HEART RATE: 73 BPM | RESPIRATION RATE: 18 BRPM | WEIGHT: 215 LBS | OXYGEN SATURATION: 97 % | HEIGHT: 75 IN

## 2022-09-26 VITALS
BODY MASS INDEX: 29.09 KG/M2 | OXYGEN SATURATION: 98 % | DIASTOLIC BLOOD PRESSURE: 70 MMHG | HEART RATE: 75 BPM | TEMPERATURE: 96 F | WEIGHT: 214.51 LBS | SYSTOLIC BLOOD PRESSURE: 140 MMHG

## 2022-09-26 DIAGNOSIS — J90 RECURRENT PLEURAL EFFUSION: Primary | ICD-10-CM

## 2022-09-26 DIAGNOSIS — G89.3 CANCER RELATED PAIN: ICD-10-CM

## 2022-09-26 DIAGNOSIS — C67.3 MALIGNANT NEOPLASM OF ANTERIOR WALL OF URINARY BLADDER (HCC): ICD-10-CM

## 2022-09-26 DIAGNOSIS — K59.03 THERAPEUTIC OPIOID INDUCED CONSTIPATION: ICD-10-CM

## 2022-09-26 DIAGNOSIS — F41.9 ANXIETY: ICD-10-CM

## 2022-09-26 DIAGNOSIS — F11.20 CONTINUOUS OPIOID DEPENDENCE (HCC): ICD-10-CM

## 2022-09-26 DIAGNOSIS — T40.2X5A THERAPEUTIC OPIOID INDUCED CONSTIPATION: ICD-10-CM

## 2022-09-26 DIAGNOSIS — C67.9 BLADDER CARCINOMA (HCC): Primary | ICD-10-CM

## 2022-09-26 DIAGNOSIS — R59.0 RETROPERITONEAL LYMPHADENOPATHY: ICD-10-CM

## 2022-09-26 PROCEDURE — RECHECK: Performed by: INTERNAL MEDICINE

## 2022-09-26 PROCEDURE — 99214 OFFICE O/P EST MOD 30 MIN: CPT | Performed by: NURSE PRACTITIONER

## 2022-09-26 RX ORDER — SENNA PLUS 8.6 MG/1
2 TABLET ORAL 2 TIMES DAILY
Qty: 90 TABLET | Refills: 0 | Status: SHIPPED | OUTPATIENT
Start: 2022-09-26

## 2022-09-26 RX ORDER — ALPRAZOLAM 0.25 MG/1
0.25 TABLET ORAL
Qty: 30 TABLET | Refills: 0
Start: 2022-09-26 | End: 2022-10-11

## 2022-09-26 RX ORDER — OXYCODONE HCL 10 MG/1
10 TABLET, FILM COATED, EXTENDED RELEASE ORAL EVERY 8 HOURS SCHEDULED
Qty: 90 TABLET | Refills: 0 | Status: SHIPPED | OUTPATIENT
Start: 2022-09-26

## 2022-09-26 RX ORDER — POLYETHYLENE GLYCOL 3350 17 G/17G
17 POWDER, FOR SOLUTION ORAL DAILY
Qty: 30 EACH | Refills: 0 | Status: SHIPPED | OUTPATIENT
Start: 2022-09-26

## 2022-09-26 NOTE — PROGRESS NOTES
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)    Relevant Medications    oxyCODONE (OxyCONTIN) 10 mg 12 hr tablet       Other    Continuous opioid dependence (HCC)    Relevant Medications    oxyCODONE (OxyCONTIN) 10 mg 12 hr tablet    Cancer related pain    Relevant Medications    oxyCODONE (OxyCONTIN) 10 mg 12 hr tablet    senna (SENOKOT) 8 6 MG tablet      Other Visit Diagnoses     Anxiety        Relevant Medications    ALPRAZolam (XANAX) 0 25 mg tablet    Therapeutic opioid induced constipation        Relevant Medications    polyethylene glycol (MIRALAX) 17 g packet          Medications adjusted this encounter:  Requested Prescriptions     Signed Prescriptions Disp Refills    oxyCODONE (OxyCONTIN) 10 mg 12 hr tablet 90 tablet 0     Sig: Take 1 tablet (10 mg total) by mouth every 8 (eight) hours Max Daily Amount: 30 mg    senna (SENOKOT) 8 6 MG tablet 90 tablet 0     Sig: Take 2 tablets (17 2 mg total) by mouth 2 (two) times a day    polyethylene glycol (MIRALAX) 17 g packet 30 each 0     Sig: Take 17 g by mouth daily    ALPRAZolam (XANAX) 0 25 mg tablet 30 tablet 0     Sig: Take 1 tablet (0 25 mg total) by mouth daily at bedtime as needed for anxiety     No orders of the defined types were placed in this encounter  Medications Discontinued During This Encounter   Medication Reason    docusate sodium (COLACE) 100 mg capsule     ALPRAZolam (XANAX) 0 25 mg tablet     oxyCODONE (OxyCONTIN) 10 mg 12 hr tablet     senna (SENOKOT) 8 6 MG tablet          Yesenia Hernandez was seen today for symptoms and planning cares related to above illnesses    I have reviewed the patient's controlled substance dispensing history in the Prescription Drug Monitoring Program in compliance with the [facility] regulations before prescribing any controlled substances  They are invited to continue to follow with us  If there are questions or concerns, please contact us through our clinic/answering service 24 hours a day, seven days a week  April EVANGELINA Miller  [facility]iative and [facility]  [phone]      Visit Information    Accompanied By: Spouse    Source of History: Spouse, patient    History Limitations: None      History of Present Illness      Casey Meraz is a 76 y o  male who presents in follow up of symptoms related to metastatic bladder cancer  Pertinent issues include: symptom management, pain, neoplasm related, nausea, fatigue, assessment of goals of care    The patient had follow up with oncology and the decision was made to pursue  enfortumab vedotin  Hie first infusion was 9/16  SInce his last visit he was seen by pulmonary  A Pleurex catheter was placed for serial drainage of recurrent pleural effusions  He has follow up imaging on October 3       the patient states he is taking oxycontin 3-4 times a day  Reviewed use of oxycontin as a long acting  Reviewed taking every 8 hours and using oxycodone 5mg  As breakthrough medications/    Changed order againto reflect patient use  He states that he is able to sleep comfortably at night when he takes the Oxycontin  He has improved pain with oxycodone 5 mg when taken as needed through the day  He states he takes oxycodone 5 mg approximately 3-4 times a day  Patient was able to teach back information  The patient has been experiencing some constipation which is likely in setting of opioid use  Increased Senna to 2 tabs twice a day, added miralax daily  Advised use of MOM if constipation occurs  The patient had a scheduled appointment at the pulmonary office and they were anxious to wrap up appointment  Did not address goals of care today       Past medical, surgical, social, and family histories are reviewed and pertinent updates are made  Review of Systems   Constitutional: Positive for fatigue  Respiratory:        Chest wall pain   Gastrointestinal: Positive for constipation  All other systems reviewed and are negative  Vital Signs    /70 (BP Location: Left arm, Cuff Size: Standard)   Pulse 75   Temp (!) 96 °F (35 6 °C) (Temporal)   Wt 97 3 kg (214 lb 8 1 oz)   SpO2 98%   BMI 29 09 kg/m²     Physical Exam and Objective Data  Physical Exam  Vitals and nursing note reviewed  Constitutional:       General: He is awake  Appearance: He is well-developed  HENT:      Head: Normocephalic and atraumatic  Mouth/Throat:      Mouth: Mucous membranes are moist       Pharynx: Oropharynx is clear  Eyes:      Conjunctiva/sclera: Conjunctivae normal    Cardiovascular:      Rate and Rhythm: Normal rate and regular rhythm  Pulses: Normal pulses  Heart sounds: No murmur heard  Pulmonary:      Effort: Pulmonary effort is normal  No respiratory distress or retractions  Breath sounds: Normal breath sounds  Comments: Left pleurex catheter   Abdominal:      Palpations: Abdomen is soft  Tenderness: There is no abdominal tenderness  Musculoskeletal:      Cervical back: Neck supple  Comments: Equal extremity strength   Skin:     General: Skin is warm and dry  Neurological:      Mental Status: He is alert and oriented to person, place, and time  GCS: GCS eye subscore is 4  GCS verbal subscore is 5  GCS motor subscore is 6  Psychiatric:         Attention and Perception: Attention normal          Mood and Affect: Mood normal          Speech: Speech normal          Behavior: Behavior normal  Behavior is cooperative           Cognition and Memory: Cognition and memory normal          35+  minutes was spent face to face with Casey Meraz and his spouse with greater than 50% of the time spent in counseling or coordination of care including discussions of etiology of diagnosis, risks and benefits of treatment, treatment instructions, risk factors and risk reduction of disease and patient and family counseling/involvement in care  All of the patient's or agent's questions were answered during this discussion

## 2022-09-26 NOTE — PROGRESS NOTES
MSW met with the pt and his wife Briefly because the pt had another appointment to attend  MSW introduced herself and explained her role  MSW spoke with the pt about his sleeping patterns and he states that he does not sleep well but that has always been the case  He states that his appetite has improved and he has no explaination why it improved  The pt states that he was depressed because he was not able to do the things that he enjoys due to his diagnosis  He states that he is not able to drive or even go walking, which are two thing that he enjoyed  The pt states that he does  Watch television and enjoys spending time with his great kenton, who will be two years old in January  The pt wife baby sits their grand daughter and she is their greatest jonny in life right now  The pt wife suggested that the assessment be completed at the pt next visit to provide the MSW with more time  MSW and patient were agreeable  The pt and his wife were asked how long they were  and they both joked and agreed that neither was sure of the exact number of years but it was over 48 years  The pt joked that he can never be in trouble about not knowing because his wife does not know how long either  The pt views his wife as a support but states that he has had some depression since the diagnosis  MSW informed the pt and his wife that she will be able to provide resources for the pt at the next visit to assist him with addressing his depression  The pt was agreeable  I have spent  minutes with Patient  today in which greater than 50% of this time was spent in counseling/coordination of care regarding Emotional Support      Palliative  will follow-up as requested by patient, family, and primary team   Please contact with any specific requests

## 2022-09-27 ENCOUNTER — RA CDI HCC (OUTPATIENT)
Dept: OTHER | Facility: HOSPITAL | Age: 77
End: 2022-09-27

## 2022-09-27 PROCEDURE — 88341 IMHCHEM/IMCYTCHM EA ADD ANTB: CPT | Performed by: PATHOLOGY

## 2022-09-27 PROCEDURE — 88112 CYTOPATH CELL ENHANCE TECH: CPT | Performed by: PATHOLOGY

## 2022-09-27 PROCEDURE — 88305 TISSUE EXAM BY PATHOLOGIST: CPT | Performed by: PATHOLOGY

## 2022-09-27 PROCEDURE — 88342 IMHCHEM/IMCYTCHM 1ST ANTB: CPT | Performed by: PATHOLOGY

## 2022-09-27 NOTE — PROGRESS NOTES
[facility]  coding opportunities     E11 [id number] and [id number]     Chart Reviewed number of suggestions sent to Provider: 2     Patients Insurance     Medicare Insurance: Medicare

## 2022-09-28 NOTE — PROGRESS NOTES
TPC Drainage Note    Left TPC drained for 840cc by Jostin Suarez RN with the supervision of Dr Kim Curry  Family instructed on proper drainage procedures and dressing reapplied  He tolerated procedure well, no complications  I did not participate in this visit        Plan for suture removal and repeat drainage on Thursday    Kenisha Cox

## 2022-09-29 ENCOUNTER — TELEPHONE (OUTPATIENT)
Dept: HEMATOLOGY ONCOLOGY | Facility: CLINIC | Age: 77
End: 2022-09-29

## 2022-09-29 ENCOUNTER — HOSPITAL ENCOUNTER (OUTPATIENT)
Dept: INFUSION CENTER | Facility: HOSPITAL | Age: 77
End: 2022-09-29
Payer: MEDICARE

## 2022-09-29 ENCOUNTER — OFFICE VISIT (OUTPATIENT)
Dept: PULMONOLOGY | Facility: CLINIC | Age: 77
End: 2022-09-29

## 2022-09-29 VITALS
TEMPERATURE: 97.7 F | SYSTOLIC BLOOD PRESSURE: 124 MMHG | HEART RATE: 84 BPM | HEIGHT: 74 IN | OXYGEN SATURATION: 96 % | WEIGHT: 210.3 LBS | DIASTOLIC BLOOD PRESSURE: 68 MMHG | BODY MASS INDEX: 26.99 KG/M2

## 2022-09-29 DIAGNOSIS — J90 RECURRENT PLEURAL EFFUSION: ICD-10-CM

## 2022-09-29 DIAGNOSIS — C67.3 MALIGNANT NEOPLASM OF ANTERIOR WALL OF URINARY BLADDER (HCC): Primary | ICD-10-CM

## 2022-09-29 DIAGNOSIS — Z95.828 PORT-A-CATH IN PLACE: ICD-10-CM

## 2022-09-29 DIAGNOSIS — R21 RASH: Primary | ICD-10-CM

## 2022-09-29 LAB
BASOPHILS # BLD AUTO: 0.03 THOUSANDS/ΜL (ref 0–0.1)
BASOPHILS NFR BLD AUTO: 0 % (ref 0–1)
EOSINOPHIL # BLD AUTO: 0.36 THOUSAND/ΜL (ref 0–0.61)
EOSINOPHIL NFR BLD AUTO: 5 % (ref 0–6)
ERYTHROCYTE [DISTWIDTH] IN BLOOD BY AUTOMATED COUNT: 14.5 % (ref 11.6–15.1)
HCT VFR BLD AUTO: 30.4 % (ref 36.5–49.3)
HGB BLD-MCNC: 9.6 G/DL (ref 12–17)
IMM GRANULOCYTES # BLD AUTO: 0.05 THOUSAND/UL (ref 0–0.2)
IMM GRANULOCYTES NFR BLD AUTO: 1 % (ref 0–2)
LYMPHOCYTES # BLD AUTO: 1.38 THOUSANDS/ΜL (ref 0.6–4.47)
LYMPHOCYTES NFR BLD AUTO: 17 % (ref 14–44)
MCH RBC QN AUTO: 29.6 PG (ref 26.8–34.3)
MCHC RBC AUTO-ENTMCNC: 31.6 G/DL (ref 31.4–37.4)
MCV RBC AUTO: 94 FL (ref 82–98)
MONOCYTES # BLD AUTO: 0.72 THOUSAND/ΜL (ref 0.17–1.22)
MONOCYTES NFR BLD AUTO: 9 % (ref 4–12)
NEUTROPHILS # BLD AUTO: 5.5 THOUSANDS/ΜL (ref 1.85–7.62)
NEUTS SEG NFR BLD AUTO: 68 % (ref 43–75)
NRBC BLD AUTO-RTO: 0 /100 WBCS
PLATELET # BLD AUTO: 269 THOUSANDS/UL (ref 149–390)
PMV BLD AUTO: 9.7 FL (ref 8.9–12.7)
RBC # BLD AUTO: 3.24 MILLION/UL (ref 3.88–5.62)
WBC # BLD AUTO: 8.04 THOUSAND/UL (ref 4.31–10.16)

## 2022-09-29 PROCEDURE — 85025 COMPLETE CBC W/AUTO DIFF WBC: CPT | Performed by: INTERNAL MEDICINE

## 2022-09-29 NOTE — PROGRESS NOTES
Patient port flushed per protocol and cbc drawn per orders for padcev tomorrow  Patient stated he has been having lower abdominal itching for the past week  States he thought it was from being shaved last Thursday 9-22 for his pleural cath insertion  Patient's lower abdomen appeared darker than rest of skin, slight redness, no rash noted, patient states it itches  Picture taken and sent to Gina Bynum RN and will advise with Dr Yvette Ricks and follow up with patient via telephone  Patient aware

## 2022-09-29 NOTE — TELEPHONE ENCOUNTER
M for patient informing him Dr Merle Anthony has sent a script for hydrocortisone 2 5% cream for his rash  He also asked patient take benadryl at night as needed  Asked he call the office should he have any further questions  Direct teams line number provided  Patient presented to the [facility] this morning  RN sent a picture of an abdominal rash, looked discolored and irritated  Patient reported itchiness and that he was shaved for a procedure    Per Dr Atilio Angel benadryl otc and hydrocortisone cream 2 5%

## 2022-09-29 NOTE — PROGRESS NOTES
TPC Drainage Note    Left tunneled pleural catheter drained for 610cc of serosanguinous fluid by myself  Family instructed on proper drainage procedures and dressing was reapplied  Patient tolerate the drainage and there were no complications        All sutures were also removed during the visit

## 2022-09-30 ENCOUNTER — HOSPITAL ENCOUNTER (OUTPATIENT)
Dept: INFUSION CENTER | Facility: HOSPITAL | Age: 77
End: 2022-09-30
Attending: INTERNAL MEDICINE
Payer: MEDICARE

## 2022-09-30 VITALS
WEIGHT: 209.88 LBS | RESPIRATION RATE: 14 BRPM | HEART RATE: 75 BPM | TEMPERATURE: 97.2 F | BODY MASS INDEX: 26.95 KG/M2 | DIASTOLIC BLOOD PRESSURE: 72 MMHG | SYSTOLIC BLOOD PRESSURE: 128 MMHG

## 2022-09-30 DIAGNOSIS — C67.3 MALIGNANT NEOPLASM OF ANTERIOR WALL OF URINARY BLADDER (HCC): Primary | ICD-10-CM

## 2022-09-30 PROCEDURE — 96367 TX/PROPH/DG ADDL SEQ IV INF: CPT

## 2022-09-30 PROCEDURE — 96413 CHEMO IV INFUSION 1 HR: CPT

## 2022-09-30 RX ORDER — SODIUM CHLORIDE 9 MG/ML
20 INJECTION, SOLUTION INTRAVENOUS ONCE
Status: COMPLETED | OUTPATIENT
Start: 2022-09-30 | End: 2022-09-30

## 2022-09-30 RX ADMIN — SODIUM CHLORIDE 20 ML/HR: 0.9 INJECTION, SOLUTION INTRAVENOUS at 10:40

## 2022-09-30 RX ADMIN — DEXAMETHASONE SODIUM PHOSPHATE: 10 INJECTION, SOLUTION INTRAMUSCULAR; INTRAVENOUS at 10:48

## 2022-09-30 RX ADMIN — ENFORTUMAB VEDOTIN 120 MG: 30 INJECTION, POWDER, LYOPHILIZED, FOR SOLUTION INTRAVENOUS at 11:26

## 2022-09-30 NOTE — PROGRESS NOTES
Pt tolerated Padcev without difficulty  No s/s reaction noted  Port flushed and deaccessed per protocol  Next appt confirmed  AVS provided  Left ambulatory with roller walker

## 2022-09-30 NOTE — PROGRESS NOTES
Pt arrived today with c/o continued severe itching of his entire abd/trunk area that keeps him from sleeping at night  Darkened skin discoloration of lower abd unchanged from yesterday  Approx 1 inch long open area now present from pt scratching  No rash noted  Documentation from yesterday states pt to take benadryl at night and topical hydrocortisone sent to his pharmacy  Pt states he was unaware  Instructed pt to  script  Also notified Mark Schaffer RN at Dr Reyna Alford office  Ok to tx per Dr Janae Mena

## 2022-10-03 ENCOUNTER — TELEPHONE (OUTPATIENT)
Dept: PULMONOLOGY | Facility: CLINIC | Age: 77
End: 2022-10-03

## 2022-10-03 ENCOUNTER — OFFICE VISIT (OUTPATIENT)
Dept: INTERNAL MEDICINE CLINIC | Facility: CLINIC | Age: 77
End: 2022-10-03
Payer: MEDICARE

## 2022-10-03 ENCOUNTER — HOSPITAL ENCOUNTER (OUTPATIENT)
Dept: CT IMAGING | Facility: HOSPITAL | Age: 77
Discharge: HOME | End: 2022-10-03
Attending: INTERNAL MEDICINE

## 2022-10-03 VITALS
HEIGHT: 74 IN | WEIGHT: 209 LBS | HEART RATE: 84 BPM | DIASTOLIC BLOOD PRESSURE: 86 MMHG | RESPIRATION RATE: 14 BRPM | BODY MASS INDEX: 26.82 KG/M2 | SYSTOLIC BLOOD PRESSURE: 156 MMHG | TEMPERATURE: 97.9 F

## 2022-10-03 DIAGNOSIS — I25.118 CORONARY ARTERY DISEASE OF NATIVE ARTERY OF NATIVE HEART WITH STABLE ANGINA PECTORIS (HCC): ICD-10-CM

## 2022-10-03 DIAGNOSIS — Z95.828 PORT-A-CATH IN PLACE: ICD-10-CM

## 2022-10-03 DIAGNOSIS — C67.3 MALIGNANT NEOPLASM OF ANTERIOR WALL OF URINARY BLADDER (HCC): ICD-10-CM

## 2022-10-03 DIAGNOSIS — N18.4 CKD (CHRONIC KIDNEY DISEASE) STAGE 4, GFR 15-29 ML/MIN (HCC): ICD-10-CM

## 2022-10-03 DIAGNOSIS — I73.9 PAD (PERIPHERAL ARTERY DISEASE) (HCC): ICD-10-CM

## 2022-10-03 DIAGNOSIS — D69.6 THROMBOCYTOPENIA (HCC): ICD-10-CM

## 2022-10-03 DIAGNOSIS — I12.9 HYPERTENSION WITH RENAL DISEASE: ICD-10-CM

## 2022-10-03 DIAGNOSIS — N18.30 TYPE 2 DIABETES MELLITUS WITH STAGE 3 CHRONIC KIDNEY DISEASE, WITH LONG-TERM CURRENT USE OF INSULIN, UNSPECIFIED WHETHER STAGE 3A OR 3B CKD (HCC): Primary | ICD-10-CM

## 2022-10-03 DIAGNOSIS — E11.22 TYPE 2 DIABETES MELLITUS WITH STAGE 3 CHRONIC KIDNEY DISEASE, WITH LONG-TERM CURRENT USE OF INSULIN, UNSPECIFIED WHETHER STAGE 3A OR 3B CKD (HCC): Primary | ICD-10-CM

## 2022-10-03 DIAGNOSIS — E78.2 MIXED HYPERLIPIDEMIA: ICD-10-CM

## 2022-10-03 DIAGNOSIS — J90 RECURRENT PLEURAL EFFUSION: ICD-10-CM

## 2022-10-03 DIAGNOSIS — C67.9 BLADDER CARCINOMA (HCC): ICD-10-CM

## 2022-10-03 DIAGNOSIS — Z95.5 PRESENCE OF STENT IN CORONARY ARTERY: ICD-10-CM

## 2022-10-03 DIAGNOSIS — I45.9 STOKES-ADAMS SYNCOPE: ICD-10-CM

## 2022-10-03 DIAGNOSIS — Z79.4 TYPE 2 DIABETES MELLITUS WITH STAGE 3 CHRONIC KIDNEY DISEASE, WITH LONG-TERM CURRENT USE OF INSULIN, UNSPECIFIED WHETHER STAGE 3A OR 3B CKD (HCC): Primary | ICD-10-CM

## 2022-10-03 PROCEDURE — 99214 OFFICE O/P EST MOD 30 MIN: CPT | Performed by: INTERNAL MEDICINE

## 2022-10-03 RX ORDER — ISOSORBIDE MONONITRATE 30 MG/1
TABLET, EXTENDED RELEASE ORAL
Qty: 90 TABLET | Refills: 0 | Status: SHIPPED | OUTPATIENT
Start: 2022-10-03

## 2022-10-03 NOTE — PATIENT INSTRUCTIONS
Please do not drive until your evaluated by Cardiology you had syncopal episodes the need to check her heart    Make an appointment with Cardiology a your family drive you    Continue follow-up with the oncologist and the diabetes the diabetes is well control get her lab work done    I will see her back in 4 weeks

## 2022-10-03 NOTE — PROGRESS NOTES
Assessment/Plan:    Spoke to  Wife  [phone]  Needs  Family drive him to appt  Until cardiology finish work up  syncopy     1  Syncopal episode no recurrence but he is wanting clearance to go back driving we do not know the etiology of the syncope may be vasovagal but the cardiologist have to finish the workup so he is allowed to drive in my opinion  Cardiologist need to clear him to drive I told him not to drive to let the family drive him to his appointments he drove himself today here will let the family know    2  Bladder cancer with recurrence he has recurrent pleural effusions he feels better since he has a catheter put in his lung that drains fluid on a intermittent places  He has some easy he looks so well being so chronically ill    3  Diabetes well control no episodes of hypoglycemia is I hemoglobin A1c is 7 1 will repeat the A1c with the next labs    4   Coronary artery disease he has no angina and no evidence of heart failure the only issue is syncope wheeze is not fully worked up    [facility]    Results for orders placed or performed during the hospital encounter of 09/29/22   CBC and differential   Result Value Ref Range    WBC 8 04 4 31 - 10 16 Thousand/uL    RBC 3 24 (L) 3 88 - 5 62 Million/uL    Hemoglobin 9 6 (L) 12 0 - 17 0 g/dL    Hematocrit 30 4 (L) 36 5 - 49 3 %    MCV 94 82 - 98 fL    MCH 29 6 26 8 - 34 3 pg    MCHC 31 6 31 4 - 37 4 g/dL    RDW 14 5 11 6 - 15 1 %    MPV 9 7 8 9 - 12 7 fL    Platelets 369 538 - 143 Thousands/uL    nRBC 0 /100 WBCs    Neutrophils Relative 68 43 - 75 %    Immat GRANS % 1 0 - 2 %    Lymphocytes Relative 17 14 - 44 %    Monocytes Relative 9 4 - 12 %    Eosinophils Relative 5 0 - 6 %    Basophils Relative 0 0 - 1 %    Neutrophils Absolute 5 50 1 85 - 7 62 Thousands/µL    Immature Grans Absolute 0 05 0 00 - 0 20 Thousand/uL    Lymphocytes Absolute 1 38 0 60 - 4 47 Thousands/µL    Monocytes Absolute 0 72 0 17 - 1 22 Thousand/µL    Eosinophils Absolute 0 36 0 00 - 0 61 Thousand/µL    Basophils Absolute 0 03 0 00 - 0 10 Thousands/µL     *Note: Due to a large number of results and/or encounters for the requested time period, some results have not been displayed  A complete set of results can be found in Results Review  No problem-specific Assessment & Plan notes found for this encounter  Diagnoses and all orders for this visit:    Type 2 diabetes mellitus with stage 3 chronic kidney disease, with long-term current use of insulin, unspecified whether stage 3a or 3b CKD (HCC)    Recurrent pleural effusion    Coronary artery disease of native artery of native heart with stable angina pectoris ([address] )    Hypertension with renal disease    PAD (peripheral artery disease) (HCC)    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    Thrombocytopenia (HCC)    Bladder carcinoma (HCC)    CKD (chronic kidney disease) stage 4, GFR 15-29 ml/min (HCC)    Presence of stent in coronary artery    Mixed hyperlipidemia    Port-A-Cath in place          Subjective:      Patient ID: Fabrizio Barker is a 76 y o  male  No chief complaint on file          Current Outpatient Medications:     Accu-Chek Softclix Lancets lancets, Test blood sugar 4 times daily, Disp: 200 each, Rfl: 0    acetaminophen (TYLENOL) 325 mg tablet, Take 650 mg by mouth every 6 (six) hours as needed for mild pain  , Disp: , Rfl:     ALPRAZolam (XANAX) 0 25 mg tablet, Take 1 tablet (0 25 mg total) by mouth daily at bedtime as needed for anxiety, Disp: 30 tablet, Rfl: 0    ARIPiprazole (ABILIFY) 2 mg tablet, Take 1 tablet (2 mg total) by mouth daily, Disp: 30 tablet, Rfl: 0    aspirin (ECOTRIN LOW STRENGTH) 81 mg EC tablet, Take 1 tablet (81 mg total) by mouth daily, Disp: , Rfl:     Azelastine HCl 0 15 % SOLN, Inhale 1 spray 2 (two) times a day, Disp: 30 mL, Rfl: 3    Bimatoprost (LUMIGAN OP), Apply 1 drop to eye daily at bedtime , Disp: , Rfl:     calcitriol (ROCALTROL) 0 25 mcg capsule, Take 1 capsule (0 25 mcg total) by mouth daily, Disp: 90 capsule, Rfl: 0    DULoxetine (CYMBALTA) 30 mg delayed release capsule, TAKE ONE CAPSULE BY MOUTH EVERY DAY, Disp: 90 capsule, Rfl: 0    ezetimibe (ZETIA) 10 mg tablet, TAKE ONE TABLET BY MOUTH ONCE DAILY IN THE EARLY MORNING, Disp: 90 tablet, Rfl: 0    Ferrous Sulfate Dried (Feosol) 200 (65 Fe) MG TABS, Take 65 mg by mouth daily, Disp: 90 tablet, Rfl: 0    fluocinonide (LIDEX) 0 05 % cream, Apply topically 2 (two) times a day (Patient taking differently: Apply topically as needed), Disp: 30 g, Rfl: 0    fluticasone (FLONASE) 50 mcg/act nasal spray, 1 spray into each nostril daily (Patient taking differently: 1 spray into each nostril as needed), Disp: 11 mL, Rfl: 1    furosemide (LASIX) 20 mg tablet, Take 1 tablet (20 mg total) by mouth daily, Disp: 90 tablet, Rfl: 1    gabapentin (Neurontin) 300 mg capsule, Take 1 capsule (300 mg total) by mouth daily at bedtime, Disp: 90 capsule, Rfl: 0    glucose blood (Accu-Chek Martha Plus) test strip, Test blood sugar 4 times daily, Disp: 200 strip, Rfl: 0    hydrocortisone 2 5 % cream, Apply topically 2 (two) times a day as needed for irritation or rash, Disp: 30 g, Rfl: 0    Insulin Pen Needle 31G X 8 MM MISC, Inject 3 times a day, Disp: 100 each, Rfl: 2    isosorbide mononitrate (IMDUR) 30 mg 24 hr tablet, Take 1 tablet (30 mg total) by mouth daily in the early morning, Disp: 90 tablet, Rfl: 0    Lantus SoloStar 100 units/mL injection pen, 18 units qhs (Patient taking differently: Inject 18 Units under the skin daily at bedtime), Disp: 30 mL, Rfl: 1    lidocaine (XYLOCAINE) 2 % topical gel, Apply topically as needed for mild pain, Disp: 30 mL, Rfl: 0    lidocaine-prilocaine (EMLA) cream, Apply topically as needed for mild pain, Disp: 30 g, Rfl: 0    loperamide (IMODIUM) 2 mg capsule, Take 2 mg by mouth 4 (four) times a day as needed for diarrhea, Disp: , Rfl:     metoprolol succinate (TOPROL-XL) 100 mg 24 hr tablet, TAKE ONE TABLET BY MOUTH ONCE DAILY, Disp: 90 tablet, Rfl: 0    multivitamin (THERAGRAN) TABS, Take 1 tablet by mouth daily  , Disp: , Rfl:     naloxone (NARCAN) 4 mg/0 1 mL nasal spray, Administer 1 spray into a nostril   If no response after 2-3 minutes, give another dose in the other nostril using a new spray , Disp: 1 each, Rfl: 1    nitrofurantoin (MACROBID) 100 mg capsule, Take 1 capsule (100 mg total) by mouth 2 (two) times a day, Disp: 10 capsule, Rfl: 0    NovoLOG FlexPen 100 units/mL injection pen, 10 units with each meal  (Patient taking differently: Inject 10 Units under the skin 3 (three) times a day with meals), Disp: 5 pen, Rfl: 1    omeprazole (PriLOSEC) 20 mg delayed release capsule, Take 20 mg by mouth daily in the early morning PRN , Disp: , Rfl:     ondansetron (Zofran ODT) 8 mg disintegrating tablet, Take 1 tablet (8 mg total) by mouth every 8 (eight) hours as needed for nausea or vomiting, Disp: 20 tablet, Rfl: 0    oxybutynin (DITROPAN-XL) 10 MG 24 hr tablet, TAKE ONE TABLET BY MOUTH EVERY DAY, Disp: 30 tablet, Rfl: 0    oxyCODONE (OxyCONTIN) 10 mg 12 hr tablet, Take 1 tablet (10 mg total) by mouth every 8 (eight) hours Max Daily Amount: 30 mg, Disp: 90 tablet, Rfl: 0    oxyCODONE (Roxicodone) 5 immediate release tablet, Take 1 tablet (5 mg total) by mouth every 4 (four) hours as needed for moderate pain Max Daily Amount: 30 mg, Disp: 60 tablet, Rfl: 0    Patiromer Sorbitex Calcium 8 4 g PACK, Take 8 4 packets by mouth, Disp: , Rfl:     phenazopyridine (PYRIDIUM) 200 mg tablet, Take 1 tablet (200 mg total) by mouth 3 (three) times a day with meals, Disp: 10 tablet, Rfl: 0    polyethylene glycol (MIRALAX) 17 g packet, Take 17 g by mouth daily, Disp: 30 each, Rfl: 0    predniSONE 20 mg tablet, Take 1 tablet (20 mg total) by mouth daily, Disp: 5 tablet, Rfl: 0    senna (SENOKOT) 8 6 MG tablet, Take 2 tablets (17 2 mg total) by mouth 2 (two) times a day, Disp: 90 tablet, Rfl: 0    sodium chloride, PF, 0 9 %, 10 mL by Intracatheter route daily Intracatheter flushing daily, Disp: 900 mL, Rfl: 0    sodium chloride, PF, 0 9 %, 10 mL by Intracatheter route daily Intracatheter flushing daily, Disp: 900 mL, Rfl: 0    sodium chloride, PF, 0 9 %, 10 mL by Intracatheter route daily Bilateral PCNs to be flushed daily with prefilled 10cc NS, Disp: 600 mL, Rfl: 3    HPI    The following portions of the patient's history were reviewed and updated as appropriate: allergies, current medications, past family history, past medical history, past social history, past surgical history and problem list     Review of Systems   Constitutional: Negative  Negative for activity change, appetite change, fatigue, fever and unexpected weight change  HENT: Negative for congestion, ear pain, hearing loss, mouth sores, postnasal drip, rhinorrhea, sore throat, trouble swallowing and voice change  Eyes: Negative for pain, redness and visual disturbance  Respiratory: Negative for cough, chest tightness, shortness of breath and wheezing  Cardiovascular: Negative for chest pain, palpitations and leg swelling  Gastrointestinal: Negative for abdominal distention, abdominal pain, blood in stool, constipation, diarrhea and nausea  Endocrine: Negative for cold intolerance, heat intolerance, polydipsia, polyphagia and polyuria  Genitourinary: Negative for difficulty urinating, dysuria, flank pain, frequency, hematuria and urgency  Musculoskeletal: Negative for arthralgias, back pain, gait problem, joint swelling and myalgias  Skin: Negative for color change and pallor  Neurological: Negative for dizziness, tremors, seizures, syncope, weakness, numbness and headaches  Hematological: Negative for adenopathy  Does not bruise/bleed easily  Psychiatric/Behavioral: Negative  Negative for sleep disturbance  The patient is not nervous/anxious  Objective: There were no vitals taken for this visit       Physical Exam  Vitals and nursing note reviewed  Constitutional:       Appearance: He is well-developed  HENT:      Head: Normocephalic  Right Ear: External ear normal       Left Ear: External ear normal       Nose: Nose normal       Mouth/Throat:      Pharynx: No oropharyngeal exudate  Eyes:      Conjunctiva/sclera: Conjunctivae normal       Pupils: Pupils are equal, round, and reactive to light  Neck:      Thyroid: No thyromegaly  Cardiovascular:      Rate and Rhythm: Normal rate and regular rhythm  Heart sounds: Normal heart sounds  No murmur heard  No friction rub  No gallop  Comments: S1-S2 regular rhythm  Extremities no edema  Pulmonary:      Effort: Pulmonary effort is normal  No respiratory distress  Breath sounds: Normal breath sounds  No wheezing or rales  Comments: Lungs are clear no wheezing rales or rhonchi  Abdominal:      General: Bowel sounds are normal  There is no distension  Palpations: Abdomen is soft  There is no mass  Tenderness: There is no abdominal tenderness  There is no guarding or rebound  Comments: Abdomen soft nontender   Musculoskeletal:         General: Normal range of motion  Cervical back: Normal range of motion and neck supple  Lymphadenopathy:      Cervical: No cervical adenopathy  Skin:     General: Skin is warm and dry  Neurological:      Mental Status: He is alert and oriented to person, place, and time     Psychiatric:         Behavior: Behavior normal          Judgment: Judgment normal

## 2022-10-03 NOTE — TELEPHONE ENCOUNTER
Pts wife has called in requesting a call in reference to her husbands Drainage kit that was said to be ordered for the pt  Please advise as the pt believes it may be time for him to come in to be drained again

## 2022-10-04 ENCOUNTER — HOSPITAL ENCOUNTER (OUTPATIENT)
Dept: CT IMAGING | Facility: HOSPITAL | Age: 77
Discharge: HOME | End: 2022-10-04
Attending: INTERNAL MEDICINE
Payer: MEDICARE

## 2022-10-04 DIAGNOSIS — K21.9 GASTROESOPHAGEAL REFLUX DISEASE WITHOUT ESOPHAGITIS: Primary | ICD-10-CM

## 2022-10-04 PROCEDURE — G1004 CDSM NDSC: HCPCS

## 2022-10-04 PROCEDURE — 71250 CT THORAX DX C-: CPT

## 2022-10-04 PROCEDURE — 74176 CT ABD & PELVIS W/O CONTRAST: CPT

## 2022-10-04 RX ORDER — OMEPRAZOLE 20 MG/1
20 CAPSULE, DELAYED RELEASE ORAL
Qty: 90 CAPSULE | Refills: 0 | Status: SHIPPED | OUTPATIENT
Start: 2022-10-04

## 2022-10-04 NOTE — TELEPHONE ENCOUNTER
Called [facility] I spoke to [employer] who stated that they were missing the patient signature page for the financial agreement  I advised her we have not ever sent it over  She stated that she normally calls the office but did not  I advised her the patient needs his supplies asap  She is going to have them delivered tomorrow  And mail out the financial agreement to the patient  Called the patient's wife back and spoke to her and made her aware

## 2022-10-05 ENCOUNTER — DOCUMENTATION (OUTPATIENT)
Dept: PULMONOLOGY | Facility: CLINIC | Age: 77
End: 2022-10-05

## 2022-10-05 NOTE — PROGRESS NOTES
Hematology/Oncology Outpatient Office Note    Date of Service: 10/14/2022    [facility] HEMATOLOGY ONCOLOGY SPECIALISTS CELSO Yanez [facility]  [phone]    Reason for Consultation:   No chief complaint on file  Cancer Stage at diagnosis: II, interval progression to Stage IV    Primary Care Physician:  Horacio Chirinos MD     Nickname: Adelso Thomas    Spouse: Victoria Castañeda    Granddaughter: Granddaughter, Zenaida Henning     Original ECO    Today's ECO    Goals and Barriers:  Current Goal: Minimize effects of disease burden, extend life  Barriers to accomplishing this: persistent fatigue and weakness, depression, anxiety, worry, L foot claudication, lower back pain from spinal stenosis  Has been requiring walkers/canes when he outside of home  Patient's Capacity to Self Care:  Patient is able to self care    Code Status: Full code    Advanced directives: not on file but I recommended he get that done    ASSESSMENT & PLAN      Diagnosis ICD-10-CM Associated Orders   1  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)  C67 3      This is a 76 y o  c PMHx notable for spinal stenosis, Gastric bypass (), 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, diabetic nephropathy, and renal vascular disease, DM, being seen as follow-up for his bladder cancer  From an overall performance status basis, patient can tolerate systemic treatment due to an ECOG PS of 2  Thoughts on approach to systemic therapy in the first line and subsequent lines of therapy:    Preferred first line would have been cisplatin/gemcitabine however patient's overall performance status and kidney function were not amenable to this      Thoughts on prediction for Grade III-V toxicity in elderly patients to systemic chemotherapy:  Age >72 years   GI/ cancers  Falls in last 6 months   Hearing impairment (b/l hearing aids)  Limited ability to walk 1 block  Requires assistance with medications  Decreased social activities   Tom Milton J[employer] 2011)  BMI<18 5 or moderate or severe weight loss or decrease in food intake in past 3 months  Mild vs moderate dementia/depression     The above 5 factors predict toxicity for the patient if he undergoes systemic chemotherapy and they were discussed with the patient  Discussion of disease response monitoring: We discussed with the patient at length the role of imaging and potential blood monitoring of burden of disease  We will opt to get CT chest, abdomen, pelvis without contrast due to kidney function as surveillance during and following therapy  As there was been progression of disease, liquid biopsy was performed to assess actionable biomarkers and determine bio-marker status  1/27/22 pt only got 1/2 of Days 1-5 5-FU due to contents spilling  Pt completed concurrent 5-FU/MTC + RT and found to have POD on 6/28/2022 PET/CT (lung mets) and had 2 cycles of q6 week Keytruda before POD  Decision made to transition to second line therapy for increased objective response and time to response due to significant burden of disease  During pt's office visit on 9/2/22, decision was made to start the patient on enfortumab vedotin due to progression of disease even on Keytruda; risks and benefits of the therapy was discussed with the patient and informed consent was signed to proceed with enfortumab  Patient did require 1 more round of thoracentesis for rapid accumulation of pleural fluid causing shortness of breath on 9/14/22; 2L of fluid was removed  Patient is scheduled for the 1st infusion to start today, 09/16/2022  Discussion of decision making  • Oncology history updated, accordingly, during this visit  • Goals of care/patient communication  o I discussed with the patient the clinical course leading up to their cancer diagnosis   I reviewed relevant office notes, imaging reports and pathology result as well   o I told the patient that this is a case of incurable disease and what this means  We discussed that the goal of anti-cancer therapy is to provide best quality of life, extend overall survival, and progression free survival as shown in clinical trials  We also discussed that there might be a point when the cancer will no longer respond to this anti-neoplastic therapy and thus he is seeing palliative care    o I explained the risks/benefits of the proposed cancer therapy: Enfortumab and after discussion including understanding risks of possible life-threatening complications and therapy-related malignancy development, informed consent for treatment has been signed  • TNM/Staging At Diagnosis  Cancer Staging  Malignant neoplasm of anterior wall of urinary bladder ([facility] [address] )  Staging form: Urinary Bladder, AJCC 8th Edition  - Clinical stage from 10/18/2021: Stage II (cT2, cN0, cM0) - Signed by Sonali Ruiz MD on 11/17/2021  Stage prefix: Initial diagnosis    Clinical staging from January 2022: Stage II has progressed to Stage IV bladder cancer given retroperitoneal lymphadenopathy and concern for metastasis    • Disease Features/Tumor Markers/Genetics  o Tumor Marker: n/a  o Notable Path Features: anterior wall Invasive high-grade urothelial carcinoma  Carcinoma invades muscularis propria (detrusor muscle)   Guardant 360 showing CDK4 amplification, TP53 mutation  • Current Treatment:  Enfortumab  • Other Supportive care:   • Treatment Team Members  o Surgeon: Dr Adamaris Cabral  o Rad Onc: Dr Janny moreno Palliative: [place]  o Nephrology: Dr Alfonzo Berkowitz  • Labs: creat 2 64 (eGFR 22), normocytic anemia, Hgb 9 4  • Diagnostics: 11/11/21 CT CAP w/o c: 1  No metastatic disease within the limitations of noncontrast technique in the chest abdomen or pelvis  2   Diffuse hemicircumferential smooth bladder wall thickening on the left, similar to the prior study when allowing for differences in bladder distention   Prostatomegaly  6/3/2022 CT CAP w/o c: Development of a few pulmonary nodules, the largest measuring 8 mm  A PET CT would be recommended  Worsening wall thickening of the urinary bladder which may represent post treatment changes  Cannot exclude cystitis or progression of cancer  6/28/2022 PET/CT: read pending, per my assessment lung metastatic hypermetabolic nodules as well as L chest wall nodule  8/26/2022 CT CAP w/o c: Pt with new large L pleural effusion contributing to his pleuritic CP  There is now bulky retroperitoneal lymphadenopathy, not seen on the prior study  The largest lymph node measures approximately 3 2 x 2 7 cm and is located to the left of the aorta concerning for metastatic disease  There is a lobulated 1 cm nodule in the left upper lobe on series 3, image 85 not seen on the prior study  There is a 7 mm nodule in the right middle lobe, increased in size since prior study with surrounding groundglass density, nonspecific  Stable 2 mm nodule right middle lobe series 3, image 85  Previously noted nodule adjacent to the major fissure in the lingula is obscured by patchy airspace consolidation  8/30/2022 NM Bone scan: No scintigraphic evidence of osseous metastasis  Port is without acute issues  10/4/2022: CT CAP w/o c: Stable disease after 1 cycle of treatment, lymph nodes, improved left para-aortic lymphadenopathy compared to 8/26/2022, Stable eccentrically thickened bladder wall anterolaterally, which is underdistended, likely known bladder neoplasm  Increase in size of a subsolid pulmonary nodule in the inferior lingular segment, unclear if infectious/inflammatory or progression of metastatic disease  Improved trace left pleural effusion with indwelling pleural catheter  Improved left para-aortic lymphadenopathy, measuring up to 2 6 x 2 2 cm (series 2, image 77) at the level of L2-3, previously 3 2 x 2 7 cm  There is a 1 7 x 1 7 cm left para-aortic lymph node at the level of L4 (series 2, image 85), previously 2 0 x 2 0 cm        Enfortumab: Overall, 55 participants (44%) with KUMAR  15 patients had CR  Responses to treatment lasted a median of mPFS 7 6 months  Discussion of decision making  I personally reviewed the following lab results, the image studies, pathology, other specialty/physicians consult notes and recommendations, and outside medical records from [facility]  I had a lengthy discussion with the patient and shared the work-up findings  We discussed the diagnosis and management plan as below  I spent 34 minutes reviewing the records (labs, clinician notes, outside records, medical history, ordering medicine/tests/procedures, interpreting the imaging/labs previously done) and coordination of care as well as direct time with the patient today, of which greater than 50% of the time was spent in counseling and coordination of care with the patient/family  · Plan/Labs  · Cont C2 Enfortumab q28 cycles   · F/u Thoracic surgery for pleurx catheter care, monitoring  · Patient expressed interest in going to [facility] to get stronger as his activity level has drastically reduced in the recent months  · Continue to assess for signs of distress as he has anxiety/depression  F/u Palliative for cancer associated pain  · F/u Urology for care and management of neph tubes  · Restaging CT CAP ordered for 3 months from now    Vivienne Underwood MD  Hematology, Oncology Staff Physician    Follow Up:  Every 4 weeks while on q28 day systemic therapy (scheduled until 12/16/2022)    All questions were answered to the patient's satisfaction during this encounter  The patient knows the contact information for our office and knows to reach out for any relevant concerns related to this encounter   They are to call for any temperature 100 4 or higher, new symptoms including but not restricted to shaking chills, decreased appetite, nausea, vomiting, diarrhea, increased fatigue, shortness of breath or chest pain, confusion, and not feeling the strength to come to the clinic  For all other listed problems and medical diagnosis in their chart - they are managed by PCP and/or other specialists, which the patient acknowledges  Thank you very much for your consultation and making us a part of this patient's care  We are continuing to follow closely with you  Please do not hesitate to reach out to us with any additional questions or concerns  ONCOLOGY HISTORY OF PRESENT ILLNESS        Oncology History   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)    Initial Diagnosis    Malignant neoplasm of anterior wall of urinary bladder ([facility] [address] )     1994 Surgery    TURBT      1994 - 1995 Chemotherapy    Induction intravesical BCG x 2      8/17/2016 Surgery    TURBT      12/13/2016 Surgery    TURBT  [facility])    Bladder, left posterior wall, biopsy: High-grade urothelial carcinoma in-situ  Muscularis propria is identified with no tumor seen  Bladder, trigone, biopsy: Non-invasive high-grade papillary urothelial carcinoma, and flat urothelial carcinoma in-situ  Muscularis propria is not identified  1/4/2017 - 2/8/2017 Chemotherapy    Induction intravesical BCG     6/19/2017 Surgery    TURBT  [facility])    - Posterior wall, biopsy: Mild chronic cystitis with focal urothelial atypia  Muscularis propria is identified      - Right lateral wall, biopsy: Granulomatous cystitis  Muscularis propria is identified  - Left lateral wall, biopsy: Non invasive high-grade urothelial carcinoma  Muscularis propria is not identified         4/26/2019 Surgery    TURBT   [facility])     - bladder, right posterior wall, biopsy: Urothelial carcinoma in situ   - bladder, right lateral wall, biopsy: Small focus of urothelial carcinoma in situ  - bladder, left posterior wall, biopsy: Urothelial carcinoma in situ   - bladder, left lateral wall, biopsy: Urothelial carcinoma in situ     - bladder, trigone, biopsy: Invasive high-grade urothelial carcinoma, invading into lamina propria  No lymphovascular invasion seen  Muscularis propria not present  Separate piece showing urothelial carcinoma in situ        7/2019 -  Chemotherapy    Induction intravesical BCG x 4      11/4/2019 Surgery    TURBT  [facility])    - Superficial bladder tumor, transurethral resection: Non-invasive high grade urothelial carcinoma, with urothelial carcinoma in situ  Muscularis propria is not identified      - Bladder tumor, transurethral resection: Non-invasive high grade urothelial carcinoma, with urothelial carcinoma in situ, see note  Muscularis propria is present and free of tumor  12/9/2019 - 1/28/2020 Chemotherapy    Induction intravesical BCG+INF        6/9/2020 - 6/23/2020 Chemotherapy    Maintenance intravesical BCG+INF        11/19/2020 Biopsy    TURBT ([address], Dr Renata Dey)    A  Urinary Bladder, Left Posterior Bladder Wall:  -Extensively- denuded urothelial lined mucosa with mild chronic inflammation, vascular congestion  And edema   -Unremarkable small fragment of detrusor muscle present  -No evidence of invasive urothelial carcinoma seen     B  Urinary Bladder, Left lateral bladder Wall:  -Partially-denuded urothelial lined mucosa with mild  chronic inflammation  -Unremarkable small fragment of detrusor muscle present  -No evidence of invasive urothelial carcinoma seen     C  Urinary Bladder, Right Posterior Bladder wall:  -Urothelial lined mucosa with mild  chronic inflammation Von Brunn nests and mild urothelial atypia, possibly due to previous BCG administration   -Unremarkable small fragment of detrusor muscle present  -No evidence of invasive urothelial carcinoma seen     D  Urinary Bladder, Right Lateral Bladder Wall:  - Urothelial lined mucosa with mild  chronic inflammation   -Muscularis propria/ detrusor muscle is not present for evaluation    -No evidence of invasive urothelial carcinoma seen         E  Prostate, Prostate Tissue:  -Urothelial lined mucosa with mild chronic inflammation, prominent Von Brunn nests and Cystitis cystica   -Unremarkable small fragment of detrusor muscle present   -No evidence of malignancy seen  10/18/2021 -  Cancer Staged    Staging form: Urinary Bladder, AJCC 8th Edition  - Clinical stage from 10/18/2021: Stage II (cT2, cN0, cM0) - Signed by Judi Wing MD on 11/17/2021  Stage prefix: Initial diagnosis       10/18/2021 Surgery    TURBT ([facility])    A  Left posterior wall, bladder (transurethral resection):     - Urothelial tissue with small atypical cauterized focus favored to represent superficially invasive high-grade urothelial carcinoma  - Muscularis propria (detrusor muscle) present, and negative for carcinoma  - Marked edema and mucosal denudation with thermal artifact noted  B  Anterior wall, bladder (transurethral resection):      - Invasive high-grade urothelial carcinoma  - Carcinoma invades muscularis propria (detrusor muscle)  C   Left lateral wall, bladder (transurethral resection):     - Urothelial tissue with small atypical focus with marked thermal artifact; cannot exclude superficial carcinoma  - Muscularis propria (detrusor muscle) present, and negative for carcinoma        - Marked edema and mucosal denudation with thermal artifact noted     1/24/2022 - 3/26/2022 Chemotherapy    mitoMYcin (MUTAMYCIN), 12 mg/m2 = 28 7 mg (100 % of original dose 12 mg/m2), Intravenous, Once, 1 of 1 cycle  Dose modification: 12 mg/m2 (original dose 12 mg/m2, Cycle 1), 3 mg/m2 (original dose 12 mg/m2, Cycle 1)  Administration: 7 2 mg (1/24/2022)  fluorouracil (ADRUCIL) ambulatory infusion Soln, 500 mg/m2/day = 4,780 mg (50 % of original dose 1,000 mg/m2/day), Intravenous, Over 96 hours, 1 of 1 cycle, Start date: 1/18/2022, End date: 1/23/2022  Dose modification: 500 mg/m2/day (original dose 1,000 mg/m2/day, Cycle 1, Reason: Dose modified as per discussion with consulting physician)     1/24/2022 - 3/24/2022 Radiation    Pelvis:1 6X 21 / 21 275 0 5,775 59      Treatment dates:  C1: 1/24/2022 - 3/24/2022     7/15/2022 - 8/26/2022 Chemotherapy    pembrolizumab (KEYTRUDA) IVPB, 400 mg, Intravenous, Once, 2 of 6 cycles  Administration: 400 mg (7/15/2022), 400 mg (8/26/2022)     9/16/2022 -  Chemotherapy    enfortumab vedotin-ejfv (PADCEV) IVPB, 125 mg (original dose 1 25 mg/kg), Intravenous, Once, 1 of 6 cycles  Dose modification: 125 mg (original dose 1 25 mg/kg, Cycle 1, Reason: Dose modified as per discussion with consulting physician)  Administration: 120 mg (9/16/2022), 120 mg (9/23/2022), 120 mg (9/30/2022)       He had been getting BCG vaccine for his bladder since 1994 6/28/2022 PET/CT: read pending, per my assessment lung metastatic hypermetabolic nodules as well as L chest wall nodule  9/14/2022: underwent thoracentesis, removed 2 L of fluid  9/22/2022: pleurx catheter care    SUBJECTIVE  (INTERVAL HISTORY)      Clotting History None   Bleeding History No major events   Cancer History Bladder   Family Cancer History None   H/O Blood/Plt Transfusion None   Tobacco/etoh/drug abuse 1 5 PPD x 17 years (quit 1970), no other abuse   Hx COVID19 Infection and Vaccine Status Pfizer x 3 (had booster 9/2021)   Cancer Screening history n/a   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)     Pain: dysuria (since 10/2021 bladder procedure, worse over the past few weeks and there was bleeding last night), LBP, L foot pain s/p remote toe amputation     His maximum weight prior to the gastric bypass that he had was 330lbs  I have reviewed the relevant past medical, surgical, social and family history  I have also reviewed allergies and medications for this patient  Interval events:   He has had some more trouble sleeping and is taking Melatonin  He had some bleeding with urine last night  Worsening dysuria over past few weeks   He has stable fatigue  He is not having any pressure in his chest and is breathing well  Eating better  He did express interest in going to rehab to increase his activity level  He has lost some weight  He has had some intermittent constipation  Review of Systems  Denies unintentional weight loss, F/C, N/V, CP, diarrhea, rash, itching, melena, hematuria, hematochezia  Chronic mild SOB with exertion and fatigue  A 10-point review of system was performed, pertinent positive and negative were detailed as above  Otherwise, the 10-point review of system was negative  Past Medical History:   Diagnosis Date   • Anemia     Last assessed: 9/28/17   • Anxiety    • Arteriosclerotic cardiovascular disease     Last assessed: 9/28/17   • Arthritis    • Bladder cancer ([facility] [address] )     bladder- had BCG treatments   • Chronic kidney disease     Stage IV   • CKD (chronic kidney disease) stage 4, GFR 15-29 ml/min ([facility])    • Colon polyp    • Coronary artery disease     7 stents   • Depression    • Diabetes mellitus ([facility])     IDDM   • GERD (gastroesophageal reflux disease)    • Glaucoma    • Hematuria    • History of fusion of cervical spine    • Hyperlipidemia    • Hypertension    • Insomnia     Last assessed: 11/14/12   • Loss of hearing     has hearing aids but usually does not wear them   • Other seasonal allergic rhinitis     Last assessed: 2/10/16   • PAD (peripheral artery disease) ([facility])    • Shortness of breath     on exertion   • Spinal stenosis of lumbar region    • Transient cerebral ischemia     No Residual   • Uses walker     w/c for longer distances       Past Surgical History:   Procedure Laterality Date   • CARDIAC SURGERY      Cath stent placement  Last assessed: 3/9/17  Interventional Catheterization   • CHOLECYSTECTOMY     • COLONOSCOPY     • CYSTOSCOPY      Diagnostic w/biopsy  Vidya Shaji  Last assessed: 12/1/14   • CYSTOSCOPY N/A 4/12/2022    Procedure: CYSTOSCOPY  Bladder biopsies  ;  Surgeon: June Mares Doreen Tabares MD;  [facility];  Service: Urology   • CYSTOSCOPY W/ RETROGRADES Right 3/1/2022    Procedure: CYSTO; stent removal retrograde;  Surgeon: Ella Grullon MD;  [facility];  Service: Urology   • CYSTOSCOPY W/ URETERAL STENT PLACEMENT Bilateral 10/18/2021    Procedure: bilateral retrogrades, cytology collection;  Surgeon: Ella Grullon MD;  [facility];  Service: Urology   • CYSTOURETHROSCOPY      w/cautery  Ammy Fines   • FL RETROGRADE PYELOGRAM  10/18/2021   • FL RETROGRADE PYELOGRAM  10/24/2021   • GASTRIC BYPASS      For morbid obesity w/Shaji-en-Y   Resolved: 11/17/09   • INCISION AND DRAINAGE OF WOUND Right 2/26/2017    Procedure: INCISION AND DRAINAGE (I&D) EXTREMITY WITH APPLICATION OF GRAFT JACKET;  Surgeon: Colman Olszewski, DPM;  [facility];  Service:    • INCISION AND DRAINAGE OF WOUND Right 4/25/2017    Procedure: INCISION AND DRAINAGE (I&D) EXTREMITY, APPLICATION OF GRAFT;  Surgeon: Colman Olszewski, DPM;  [facility];  Service:    • IR BIOPSY OTHER  7/2/2020   • IR LOWER EXTREMITY ANGIOGRAM  2/8/2021   • IR LOWER EXTREMITY ANGIOGRAM  2/11/2021   • IR NEPHROSTOMY TUBE CHECK/CHANGE/REPOSITION/REINSERTION/UPSIZE  4/28/2022   • IR NEPHROSTOMY TUBE CHECK/CHANGE/REPOSITION/REINSERTION/UPSIZE  5/24/2022   • IR NEPHROSTOMY TUBE CHECK/CHANGE/REPOSITION/REINSERTION/UPSIZE  6/7/2022   • IR NEPHROSTOMY TUBE CHECK/CHANGE/REPOSITION/REINSERTION/UPSIZE  7/28/2022   • IR NEPHROSTOMY TUBE PLACEMENT  2/25/2022   • IR PORT PLACEMENT  1/17/2022   • IR THORACENTESIS  9/2/2022   • IR THORACENTESIS  9/14/2022   • IR TUNNELED CENTRAL LINE PLACEMENT  12/24/2020   • JOINT REPLACEMENT      christofer knees replaced   • IA AMPUTATION METATARSAL+TOE,SINGLE Left 12/21/2020    Procedure: RAY RESECTION FOOT;  Surgeon: Garrick Romero DPM;  [facility];  Service: Podiatry   • IA AMPUTATION METATARSAL+TOE,SINGLE Left 12/31/2020    Procedure: 5TH MET RESECTION;  Surgeon: Kitty Luna DEJUAN De Jesus;  [facility];  Service: Podiatry   • NJ CYSTOURETHROSCOPY W/IRRIG & EVAC CLOTS N/A 2/10/2021    Procedure: CYSTOSCOPY EVACUATION OF CLOTS, fulguration;  Surgeon: Harsha Moreira MD;  [facility];  Service: Urology   • NJ CYSTOURETHROSCOPY W/IRRIG & EVAC CLOTS N/A 10/24/2021    Procedure: CYSTOSCOPY EVACUATION OF CLOT, fulguration of bleeding vessels, right ureter stent placement, retrograde pyelogram;  Surgeon: Jessa Alexander MD;  [facility];  Service: Urology   • NJ CYSTOURETHROSCOPY,BIOPSY N/A 8/16/2016    Procedure: CYSTOSCOPY WITH BIOPSIES;  Surgeon: Faustino Stoddard MD;  [facility];  Service: Urology   • NJ CYSTOURETHROSCOPY,FULGUR <0 5 CM LESN N/A 11/19/2020    Procedure: CYSTO W/BIOPSIES, transurethral prostate bx;  Surgeon: Philomena Bonner MD;  [facility];  Service: Urology   • NJ CYSTOURETHROSCOPY,FULGUR >5 CM LESN Bilateral 10/18/2021    Procedure: TRANSURETHRAL RESECTION OF BLADDER TUMOR (TURBT);   Surgeon: Wai Vazquez MD;  [facility];  Service: Urology   • NJ Francisca Rosenbaum 3RD+ ORD [address] PEL/[facility] Left 2/8/2021    Procedure: LEG angiogram, CO2 w/limited contrast with balloon angioplasty postertior tibial artery;  Surgeon: Santana Morin MD;  [facility];  Service: Vascular   • ROTATOR CUFF REPAIR     • SMALL INTESTINE SURGERY      Surgery Shaji-en-Y   • SPINAL FUSION      lumbar and cervical fusions   • VAC DRESSING APPLICATION Right 4/20/5120    Procedure: APPLICATION VAC DRESSING;  Surgeon: Alva Burleson DPM;  [facility];  Service:    • WOUND DEBRIDEMENT Left 2/16/2021    Procedure: FOOT DEBRIDE, [address] OUT w/graft application;  Surgeon: Alva Burleson DPM;  [facility];  Service: Podiatry       Family History   Problem Relation Age of Onset   • Diabetes Mother    • Heart disease Mother    • Other Mother         High blood pressure   • Heart disease Father    • Diabetes Sister    • Other Sister         High blood pressure   • Kidney disease Sister    • Heart disease Brother    • Other Brother         High blood pressure       Social History     Socioeconomic History   • Marital status: /Civil Union     Spouse name: Not on file   • Number of children: Not on file   • Years of education: Not on file   • Highest education level: Not on file   Occupational History   • Not on file   Tobacco Use   • Smoking status: Former Smoker     Packs/day: 3 00     Years: 27 00     Pack years: 81 00     Types: Cigarettes     Quit date: 1970     Years since quittin 8   • Smokeless tobacco: Never Used   Vaping Use   • Vaping Use: Never used   Substance and Sexual Activity   • Alcohol use: Never     Comment: beer / liquor   • Drug use: Not Currently     Types: Marijuana     Comment: quit 2019 had medical marijuana   • Sexual activity: Not Currently   Other Topics Concern   • Not on file   Social History Narrative    Consumes 1 cup of coffee and 1 soda per day     Social Determinants of Health     Financial Resource Strain: Not on file   Food Insecurity: Not on file   Transportation Needs: Not on file   Physical Activity: Not on file   Stress: Not on file   Social Connections: Not on file   Intimate Partner Violence: Not on file   Housing Stability: Not on file       Allergies   Allergen Reactions   • Atorvastatin Hives, Itching and Rash   • Simvastatin Rash and Edema     Edema of lower legs   • Statins Hives and Itching   • Insulin Lispro Swelling and Edema     " Lower Legs"   • Other Itching, Rash and Other (See Comments)     "EKG Patches"   "blue EKG patches"       Current Outpatient Medications   Medication Sig Dispense Refill   • Accu-Chek Softclix Lancets lancets Test blood sugar 4 times daily 200 each 0   • acetaminophen (TYLENOL) 325 mg tablet Take 650 mg by mouth every 6 (six) hours as needed for mild pain       • ALPRAZolam (XANAX) 0 25 mg tablet Take 2 tablets (0 5 mg total) by mouth daily at bedtime as needed for anxiety 30 tablet 0   • ARIPiprazole (ABILIFY) 2 mg tablet Take 1 tablet (2 mg total) by mouth daily 30 tablet 0   • aspirin (ECOTRIN LOW STRENGTH) 81 mg EC tablet Take 1 tablet (81 mg total) by mouth daily     • Azelastine HCl 0 15 % SOLN Inhale 1 spray 2 (two) times a day 30 mL 3   • Bimatoprost (LUMIGAN OP) Apply 1 drop to eye daily at bedtime      • calcitriol (ROCALTROL) 0 25 mcg capsule Take 1 capsule (0 25 mcg total) by mouth daily 90 capsule 0   • DULoxetine (CYMBALTA) 30 mg delayed release capsule TAKE ONE CAPSULE BY MOUTH EVERY DAY 90 capsule 0   • ezetimibe (ZETIA) 10 mg tablet TAKE ONE TABLET BY MOUTH ONCE DAILY IN THE EARLY MORNING 90 tablet 0   • Ferrous Sulfate Dried (Feosol) 200 (65 Fe) MG TABS Take 65 mg by mouth daily 90 tablet 0   • fluocinonide (LIDEX) 0 05 % cream Apply topically 2 (two) times a day (Patient taking differently: Apply topically as needed) 30 g 0   • fluticasone (FLONASE) 50 mcg/act nasal spray 1 spray into each nostril daily (Patient taking differently: 1 spray into each nostril as needed) 11 mL 1   • furosemide (LASIX) 20 mg tablet Take 1 tablet (20 mg total) by mouth daily 90 tablet 1   • gabapentin (NEURONTIN) 300 mg capsule TAKE ONE CAPSULE BY MOUTH EVERY DAY AT BEDTIME 90 capsule 0   • glucose blood (Accu-Chek Martha Plus) test strip Test blood sugar 4 times daily 200 strip 0   • hydrocortisone 2 5 % cream Apply topically 2 (two) times a day as needed for irritation or rash 30 g 0   • Insulin Pen Needle 31G X 8 MM MISC Inject 3 times a day 100 each 2   • isosorbide mononitrate (IMDUR) 30 mg 24 hr tablet TAKE ONE TABLET BY MOUTH EVERY DAY IN THE MORNING 90 tablet 0   • Lantus SoloStar 100 units/mL injection pen 18 units qhs (Patient taking differently: Inject 18 Units under the skin daily at bedtime) 30 mL 1   • lidocaine (XYLOCAINE) 2 % topical gel Apply topically as needed for mild pain 30 mL 0   • lidocaine-prilocaine (EMLA) cream Apply topically as needed for mild pain 30 g 0   • loperamide (IMODIUM) 2 mg capsule Take 2 mg by mouth 4 (four) times a day as needed for diarrhea     • metoprolol succinate (TOPROL-XL) 100 mg 24 hr tablet TAKE ONE TABLET BY MOUTH ONCE DAILY 90 tablet 0   • multivitamin (THERAGRAN) TABS Take 1 tablet by mouth daily  • naloxone (NARCAN) 4 mg/0 1 mL nasal spray Administer 1 spray into a nostril  If no response after 2-3 minutes, give another dose in the other nostril using a new spray   1 each 1   • nitrofurantoin (MACROBID) 100 mg capsule Take 1 capsule (100 mg total) by mouth 2 (two) times a day 10 capsule 0   • NovoLOG FlexPen 100 units/mL injection pen 10 units with each meal  (Patient taking differently: Inject 10 Units under the skin 3 (three) times a day with meals) 5 pen 1   • omeprazole (PriLOSEC) 20 mg delayed release capsule Take 1 capsule (20 mg total) by mouth daily in the early morning PRN 90 capsule 0   • ondansetron (Zofran ODT) 8 mg disintegrating tablet Take 1 tablet (8 mg total) by mouth every 8 (eight) hours as needed for nausea or vomiting 20 tablet 0   • oxybutynin (DITROPAN-XL) 10 MG 24 hr tablet TAKE ONE TABLET BY MOUTH EVERY DAY 30 tablet 0   • oxyCODONE (OxyCONTIN) 10 mg 12 hr tablet Take 1 tablet (10 mg total) by mouth every 8 (eight) hours Max Daily Amount: 30 mg 90 tablet 0   • oxyCODONE (Roxicodone) 5 immediate release tablet Take 1 tablet (5 mg total) by mouth every 4 (four) hours as needed for moderate pain Max Daily Amount: 30 mg 60 tablet 0   • Patiromer Sorbitex Calcium 8 4 g PACK Take 8 4 packets by mouth     • phenazopyridine (PYRIDIUM) 200 mg tablet Take 1 tablet (200 mg total) by mouth 3 (three) times a day with meals 10 tablet 0   • polyethylene glycol (MIRALAX) 17 g packet Take 17 g by mouth daily 30 each 0   • predniSONE 20 mg tablet Take 1 tablet (20 mg total) by mouth daily 5 tablet 0   • senna (SENOKOT) 8 6 MG tablet Take 2 tablets (17 2 mg total) by mouth 2 (two) times a day 90 tablet 0 • sodium chloride, PF, 0 9 % 10 mL by Intracatheter route daily Bilateral PCNs to be flushed daily with prefilled 10cc  mL 3   • sodium chloride, PF, 0 9 % 10 mL by Intracatheter route daily Intracatheter flushing daily 900 mL 0   • sodium chloride, PF, 0 9 % 10 mL by Intracatheter route daily Intracatheter flushing daily 900 mL 0     No current facility-administered medications for this visit  (Not in a hospital admission)      Objective:     24 Hour Vitals Assessment:     Vitals:    10/14/22 0911   BP: 126/64   Pulse: 71   Resp: 16   Temp: 97 7 °F (36 5 °C)   SpO2: 98%     Weight today: 203 lbs    PHYSICIAN EXAM:    General: Appearance: alert, cooperative, NAD, using a walker for today's visit  HEENT: Normocephalic, atraumatic  No scleral icterus  conjunctivae clear  EOMI  Chest: No tenderness to palpation  No open wound noted  Lungs: L sided diminished compared to the R, Respirations unlabored  Cardiac: Regular rate and rhythm, +S1and S2  Abdomen: Soft, non-tender, non-distended  Bowel sounds are normal    :  Bilateral nephrostomy tubes present, draining clear urine  Extremities:  No edema, cyanosis, clubbing  Skin: Skin color, turgor are normal    Lymphatics: no palpable supra-cervical, axillary, or inguinal adenopathy  Neurologic: Awake, Alert, and oriented, no gross focal deficits noted b/l  Port c/d/i      DATA REVIEW:    Pathology Result:    Final Diagnosis   Date Value Ref Range Status   09/22/2022   Final    A -B  Pleural Fluid, Left (Thin-prep and cell block preparations):    Negative for malignancy  Benign mesothelial cells and histiocytes  Scattered lymphocytes and neutrophils  Proteinaceous material      Satisfactory for evaluation  Comment: Immunohistochemistry for MOC-31 and BerEp4 is negative  CD68 stains numerous histiocytes, D2-40 stains rare mesothelial cells, and Gata3 stains inflammatory cells  09/02/2022   Final    A B   Pleural fluid, Left, Thoracentesis (ThinPrep and cell block preparations):  Negative for malignancy  Benign mesothelial cells, lymphocytes and histiocytes  Satisfactory for evaluation  04/12/2022   Final    A  Urinary Bladder, selected bladder biopsies:  - Focally intact urothelial mucosa with associated acutely inflamed granulation tissue  See comment  B  Prostatic urethra:  - Focally intact urothelial mucosa with associated acutely inflamed granulation tissue  See comment  Comment: Immunohistochemistry for AE1/3 is negative for an infiltrative pattern  Clinical history notable for status post chemotherapy and urine culture positive for Serratia marcescens  The findings are compatible with infectious cystitis  10/18/2021   Final    A  Left posterior wall, bladder (transurethral resection):     - Urothelial tissue with small atypical cauterized focus favored to represent superficially invasive high-grade urothelial carcinoma  - Muscularis propria (detrusor muscle) present, and negative for carcinoma  - Marked edema and mucosal denudation with thermal artifact noted  B  Anterior wall, bladder (transurethral resection):      - Invasive high-grade urothelial carcinoma  - Carcinoma invades muscularis propria (detrusor muscle)  C   Left lateral wall, bladder (transurethral resection):     - Urothelial tissue with small atypical focus with marked thermal artifact; cannot exclude superficial carcinoma  - Muscularis propria (detrusor muscle) present, and negative for carcinoma  - Marked edema and mucosal denudation with thermal artifact noted  Comment: This is an appended report  These results have been appended to a previously preliminary verified report  10/18/2021   Final    A  Renal Washing, RIGHT RENAL PELVIS WASHING:  Suspicious for high grade urothelial carcinoma [facility]) - see comment      Comment:  The above diagnostic category is from the recently published book, [facility] [employer] [facility] Urinary Cytology, and is in keeping with the ongoing effort for utilization of standardized diagnostic terminology in urine cytology  *    *[facility] [place] [facility] Reporting Urinary Cytology  Renetta Goodwin Vladimir Keith; 2016  B  Renal Washing, LEFT RENAL PELVIS WASHING:  Atypical urothelial cells (AUC) - see comment  Comment:  The above diagnostic category is from the recently published book, [facility] [place] [facility] Reporting Urinary Cytology, and is in keeping with the ongoing effort for utilization of standardized diagnostic terminology in urine cytology  *    *[facility] [place] [facility] Urinary Cytology  Renetta Goodwin Vladimir Keith; 2016          09/03/2021   Final    A  Urine, Voided, :  Atypical urothelial cells (AUC) - see comment  Red blood cells     Satisfactory for evaluation  Comment:    - Few atypical urothelial cells noted in this patient with a 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  A high grade urothelial carcinoma cannot be excluded on this material  Suggest clinical correlation and follow-up as indicated  - The above diagnostic category is from the recently published book, [facility] [place] [facility] Urinary Cytology, and is in keeping with the ongoing effort for utilization of standardized diagnostic terminology in urine cytology  *    *[facility] [place] [facility] Reporting Urinary Cytology  Renetta Gilbert Keith; 2016 '          12/31/2020   Final    A  Left 5th metatarsal bone:  - Acute osteomyelitis in benign metatarsal bone  12/21/2020   Final    A  Bone, left 5th metatarsal, amputation:       - Acute osteomyelitis  - No dysplasia or malignancy is identified  B  Bone, 5th metatarsal clean margin, excision:       - Focal acute osteomyelitis  - Large caliber blood vessels with luminal calcifications and greater than 75% occlusion  - No dysplasia or malignancy is identified  [facility] [address] [facility] [address]       11/19/2020   Final    A  Urinary Bladder, Left Posterior Bladder Wall:  -Extensively- denuded urothelial lined mucosa with mild chronic inflammation, vascular congestion  And edema   -Unremarkable small fragment of detrusor muscle present  -No evidence of invasive urothelial carcinoma seen    B  Urinary Bladder, Left lateral bladder Wall:  -Partially-denuded urothelial lined mucosa with mild  chronic inflammation  -Unremarkable small fragment of detrusor muscle present  -No evidence of invasive urothelial carcinoma seen    C  Urinary Bladder, Right Posterior Bladder wall:  -Urothelial lined mucosa with mild  chronic inflammation Von Brunn nests and mild urothelial atypia, possibly due to previous BCG administration   -Unremarkable small fragment of detrusor muscle present  -No evidence of invasive urothelial carcinoma seen    D  Urinary Bladder, Right Lateral Bladder Wall:  - Urothelial lined mucosa with mild  chronic inflammation   -Muscularis propria/ detrusor muscle is not present for evaluation    -No evidence of invasive urothelial carcinoma seen  E  Prostate, Prostate Tisssue:  -Urothelial lined mucosa with mild chronic inflammation, prominent Von Brunn nests and Cystitis cystica   -Unremarkable small fragment of detrusor muscle present   -No evidence of malignancy seen  08/17/2020   Final    A  Urine, Other, :  Negative for high grade urothelial carcinoma ([address]) - see comment  Urothelial cells, squamous cells, red blood cells and neutrophils  Satisfactory for evaluation  Comment:  The above diagnostic category is from the recently published book, [facility] [place] [facility] Reporting Urinary Cytology, and is in keeping with the ongoing effort for utilization of standardized diagnostic terminology in urine cytology  *    *[facility] [place] for Reporting Urinary Cytology  Renetta Mtz; 2016 08/16/2016   Final    A   Bladder, biopsy:  -  High-grade urothelial carcinoma with flat and focal papillary architecture (see note)  07/18/2016   Final    A  Urine, clean catch (ThinPrep): Atypical urothelial cells (AUC) - see comment  Atypical single urothelial cells, benign squamous cells, red blood cells and neutrophils  Background of degenerative changes noted  Satisfactory for evaluation  Comment:  The above diagnostic category is from the recently published book, The [place] [facility] Reporting Urinary Cytology, and is in keeping with the ongoing effort for utilization of standardized diagnostic terminology in urine cytology  *    *[facility] [employer] [facility] Reporting Urinary Cytology  Renetta Mtz; 2016      01/18/2016   Final    A  Urine, Unspecified Source, :  Rare cluster of degenerated appearing urothelial cells present; see note  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  Few red blood cells also identified  Satisfactory for evaluation  Image Results:   Image result are reviewed and documented in Hematology/Oncology history    CT chest abdomen pelvis wo contrast  Narrative: CT CHEST, ABDOMEN AND PELVIS WITHOUT IV CONTRAST    INDICATION:   C67 3: Malignant neoplasm of anterior wall of bladder  COMPARISON:  CT from August 26, 2022    TECHNIQUE: CT examination of the chest, abdomen and pelvis was performed without intravenous contrast  Axial, sagittal, and coronal 2D reformatted images were created from the source data and submitted for interpretation  Radiation dose length product (DLP) for this visit:  621 mGy-cm     This examination, like all CT scans performed in the [facility], was performed utilizing techniques to minimize radiation dose exposure, including the use of iterative   reconstruction and automated exposure control  Enteric contrast was administered  FINDINGS:    CHEST    LUNGS:  0 8 x 2 0 cm subsolid nodule at in the inferior lingular segment along the oblique fissure (series 3, image 106), previously there was a 0 4 x 1 0 cm nodule in this region on the CT of [place] 3, 2022  New small focus of sub-5 mm tree-in-bud nodules in the left upper lobe (series 3, image 41), likely bronchiolitis  Stable 5 mm nodule along the right minor fissure (series 3, image 77)  Stable 3 mm right upper lobe nodule (series 3, image 85)  Scattered punctate calcified granulomas  PLEURA:  Trace left pleural effusion, significantly improved  Indwelling left basilar pleural catheter  HEART/GREAT VESSELS: Severe coronary artery calcifications  Mitral annular calcifications  Normal heart size  No pericardial effusion  No thoracic aortic aneurysm  MEDIASTINUM AND DIMAS:  Patulous esophagus containing oral contrast, which may be be related to recent ingestion of oral contrast prior to the exam     CHEST WALL AND LOWER NECK:  Right chest port catheter tip with tip terminating near the cavoatrial junction    ABDOMEN    LIVER/BILIARY TREE:  Unremarkable  GALLBLADDER:  Cholecystectomy  SPLEEN:  Unremarkable  PANCREAS:  Unremarkable  ADRENAL GLANDS:  Unremarkable  KIDNEYS/URETERS:  Bilateral percutaneous nephrostomy catheters in place  There is a small amount of refluxed air within both renal collecting systems  Stable mild left hydroureter with adjacent fat stranding, without hydronephrosis  Bilateral renal   cysts  STOMACH AND BOWEL:  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  Colonic diverticulosis without diverticulitis  Stable mild thickening of the rectum, which may reflect proctitis  APPENDIX:  No findings to suggest appendicitis      ABDOMINOPELVIC CAVITY:  Improved left para-aortic lymphadenopathy, measuring up to 2 6 x 2 2 cm (series 2, image 77) at the level of L2-3, previously 3 2 x 2 7 cm  There is a 1 7 x 1 7 cm left para-aortic lymph node at the level of L4 (series 2, image   85), previously 2 0 x 2 0 cm  VESSELS:  Basket calcifications in the aorta and branching vessels  PELVIS    REPRODUCTIVE ORGANS:  Mildly enlarged prostate gland, stable  URINARY BLADDER: Underdistended urinary bladder with eccentrically thickened bladder wall anterolaterally, with wall thickness of up to 1 8 cm, similar to the prior study  There is a small focus of gas within the lumen of the urinary bladder, which can   be seen in the setting of recent instrumentation, Shirley catheter placement or represent cystitis  ABDOMINAL WALL/INGUINAL REGIONS:  Small periumbilical hernia  Stable mild scarring in the anterior abdominal wall  OSSEOUS STRUCTURES:  Degenerative changes of the imaged spine  Cervical spine fixation hardware  Mild retrolisthesis of L5 on S1  Stable  Impression: 1  Improved left para-aortic lymphadenopathy compared to 8/26/2022, compatible with partial response to therapy  2   Stable eccentrically thickened bladder wall anterolaterally, which is underdistended, likely known bladder neoplasm     3  Increase in size of a subsolid pulmonary nodule in the inferior lingular segment, unclear if infectious/inflammatory or progression of metastatic disease  Recommend short-term interval follow-up CT in 3 months for interval change  4  Improved trace left pleural effusion with indwelling pleural catheter    5  Findings suggestive of proctitis  The study was marked in EPIC for significant notification, and for follow-up  Workstation performed: [id number]      LABS:  Lab data are reviewed and documented in HemOnc history         Lab Results   Component Value Date    HGB 9 4 (L) 10/13/2022    HCT 28 6 (L) 10/13/2022    MCV 92 10/13/2022     10/13/2022    WBC 5 53 10/13/2022    NRBC 0 10/13/2022    BANDSPCT 3 02/04/2022    ATYLMPCT 1 (H) 02/04/2022     Lab Results   Component Value Date     09/03/2015    K 4 1 10/13/2022     10/13/2022    CO2 24 10/13/2022    ANIONGAP 5 09/03/2015    BUN 35 (H) 10/13/2022    CREATININE 2 49 (H) 10/13/2022    GLUCOSE 203 (H) 09/03/2015    GLUF 84 08/25/2022    CALCIUM 8 2 (L) 10/13/2022    CORRECTEDCA 9 0 09/22/2022    AST 40 10/13/2022    ALT 39 10/13/2022    ALKPHOS 121 10/13/2022    PROT 6 4 07/26/2015    BILITOT 0 50 07/26/2015    EGFR 23 10/13/2022       Lab Results   Component Value Date    IRON 186 (H) 12/10/2021    TIBC 377 12/10/2021    FERRITIN 23 12/10/2021    FERRITIN 40 10/13/2021    FERRITIN 31 06/22/2021    FERRITIN 23 02/17/2021    FERRITIN 52 12/19/2020    FERRITIN 73 09/19/2018       Recent Labs     10/13/22  0954   WBC 5 53     By:  Micheline Adamson, 10/14/2022, 9:23 AM

## 2022-10-05 NOTE — PROGRESS NOTES
Drained 73 ml from pts left lung drain  Incision was absent of redness or puss  Placed new bandage on incision site; Using pad, gauze and large tegaderm

## 2022-10-07 RX ORDER — SODIUM CHLORIDE 9 MG/ML
20 INJECTION, SOLUTION INTRAVENOUS ONCE
Status: CANCELLED | OUTPATIENT
Start: 2022-10-14

## 2022-10-07 RX ORDER — SODIUM CHLORIDE 9 MG/ML
20 INJECTION, SOLUTION INTRAVENOUS ONCE
Status: CANCELLED | OUTPATIENT
Start: 2022-10-21

## 2022-10-07 RX ORDER — SODIUM CHLORIDE 9 MG/ML
20 INJECTION, SOLUTION INTRAVENOUS ONCE
Status: CANCELLED | OUTPATIENT
Start: 2022-10-28

## 2022-10-08 DIAGNOSIS — E11.22 TYPE 2 DIABETES MELLITUS WITH STAGE 3 CHRONIC KIDNEY DISEASE, WITH LONG-TERM CURRENT USE OF INSULIN, UNSPECIFIED WHETHER STAGE 3A OR 3B CKD (HCC): ICD-10-CM

## 2022-10-08 DIAGNOSIS — M47.816 LUMBAR SPONDYLOSIS: ICD-10-CM

## 2022-10-08 DIAGNOSIS — N13.30 BILATERAL HYDRONEPHROSIS: ICD-10-CM

## 2022-10-08 DIAGNOSIS — N18.30 TYPE 2 DIABETES MELLITUS WITH STAGE 3 CHRONIC KIDNEY DISEASE, WITH LONG-TERM CURRENT USE OF INSULIN, UNSPECIFIED WHETHER STAGE 3A OR 3B CKD (HCC): ICD-10-CM

## 2022-10-08 DIAGNOSIS — M51.36 DEGENERATION OF LUMBAR INTERVERTEBRAL DISC: ICD-10-CM

## 2022-10-08 DIAGNOSIS — Z79.4 TYPE 2 DIABETES MELLITUS WITH STAGE 3 CHRONIC KIDNEY DISEASE, WITH LONG-TERM CURRENT USE OF INSULIN, UNSPECIFIED WHETHER STAGE 3A OR 3B CKD (HCC): ICD-10-CM

## 2022-10-08 DIAGNOSIS — M54.16 LUMBAR RADICULOPATHY: ICD-10-CM

## 2022-10-10 RX ORDER — DULOXETIN HYDROCHLORIDE 30 MG/1
CAPSULE, DELAYED RELEASE ORAL
Qty: 90 CAPSULE | Refills: 0 | Status: SHIPPED | OUTPATIENT
Start: 2022-10-10

## 2022-10-10 RX ORDER — OXYBUTYNIN CHLORIDE 10 MG/1
TABLET, EXTENDED RELEASE ORAL
Qty: 30 TABLET | Refills: 0 | Status: SHIPPED | OUTPATIENT
Start: 2022-10-10

## 2022-10-10 RX ORDER — GABAPENTIN 300 MG/1
CAPSULE ORAL
Qty: 90 CAPSULE | Refills: 0 | Status: SHIPPED | OUTPATIENT
Start: 2022-10-10

## 2022-10-10 NOTE — PROGRESS NOTES
Outpatient Follow-Up - Palliative and Supportive Care   Yesenia Hernandez 76 y o  male [id number]    Assessment & Plan  Problem List Items Addressed This Visit        Immune and Lymphatic    Retroperitoneal lymphadenopathy - Primary       Genitourinary    Bladder carcinoma ([facility] [address] )    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)       Other    Cancer related pain      Other Visit Diagnoses     Anxiety        Relevant Medications    ALPRAZolam (XANAX) 0 25 mg tablet          Medications adjusted this encounter:  Requested Prescriptions     Signed Prescriptions Disp Refills   • ALPRAZolam (XANAX) 0 25 mg tablet 30 tablet 0     Sig: Take 2 tablets (0 5 mg total) by mouth daily at bedtime as needed for anxiety     No orders of the defined types were placed in this encounter  Medications Discontinued During This Encounter   Medication Reason   • ALPRAZolam Cornell Sharif) 0 25 mg tablet          Yesenia Hernandez was seen today for symptoms and planning cares related to above illnesses  I have reviewed the patient's controlled substance dispensing history in the Prescription Drug Monitoring Program in compliance with the [facility] regulations before prescribing any controlled substances  They are invited to continue to follow with us  If there are questions or concerns, please contact us through our clinic/answering service 24 hours a day, seven days a week  April D Chavez  [address] [place] [address] Palliative and Supportive Care        Visit Information    Accompanied By: Spouse    Source of History: Self    History Limitations: None      History of Present Illness      Yesenia Hernandez is a 76 y o  male who presents in follow up of symptoms related to metastatic bladder cancer  Pertinent issues include: symptom management, pain, neoplasm related, fatigue, suppor tin serious illness  At his last appointment the patient states he is taking oxycontin 3-4 times a day  Reviewed use of oxycontin as a long acting  Reviewed taking every 8 hours and using oxycodone 5mg  As breakthrough medications/    Changed order againto reflect patient use  Nishant Herron stated that he is able to sleep comfortably at night when he takes the [address] had improved pain with oxycodone 5 mg when taken as needed through the day  Nishant Herron states he takes oxycodone 5 mg approximately 3-4 times a day  Patient was able to teach back information  The patient has been experiencing some constipation which is likely in setting of opioid use  Increased Senna to 2 tabs twice a day, added miralax daily  Advised use of MOM if constipation occurs  At this appointment the patient feels his pain is adequately managed with oxycontin TID and oxycodone 5 mg Q 4 hours as needed which he takes about 1-2 times /day  There is some concern over the cost of oxycontin,  The oxycontin is a $60/month cost to the patient  Called and discussed with his pharmacy  Asked to patient to also reach out to the [place] see if [place] more of the [place] than [place]  He feels like his appetite is improving  His weight has been stable for the past 14 days  He continues to have difficulty sleeping  Increased hs xanax dose to 0 5 mg and advised he could also use with melatonin  Generally he does feel that he is getting weaker although he tries to stay as active as he can  Past medical, surgical, social, and family histories are reviewed and pertinent updates are made  Review of Systems   Constitutional: Positive for fatigue  Negative for chills and fever  HENT: Negative for ear pain and sore throat  Eyes: Negative for pain and visual disturbance  Respiratory: Negative for cough and shortness of breath  Chest wall pain  Cardiovascular: Negative for chest pain and palpitations  Gastrointestinal: Negative for abdominal pain and vomiting  Genitourinary: Negative for dysuria and hematuria     Musculoskeletal: Negative for arthralgias and back pain  Skin: Negative for color change and rash  Neurological: Negative for seizures and syncope  Psychiatric/Behavioral: Positive for sleep disturbance  All other systems reviewed and are negative  Vital Signs    /60 (BP Location: Left arm, Cuff Size: Standard)   Pulse 69   Temp (!) 96 °F (35 6 °C) (Temporal)   Wt 94 5 kg (208 lb 5 4 oz)   SpO2 98%   BMI 26 75 kg/m²     Physical Exam and Objective Data  Physical Exam  Vitals and nursing note reviewed  Constitutional:       General: He is awake  Appearance: He is well-developed  HENT:      Head: Normocephalic and atraumatic  Eyes:      Conjunctiva/sclera: Conjunctivae normal    Cardiovascular:      Rate and Rhythm: Normal rate  Pulmonary:      Effort: Pulmonary effort is normal  No respiratory distress or retractions  Abdominal:      Palpations: Abdomen is soft  Tenderness: There is no abdominal tenderness  Musculoskeletal:      Cervical back: Neck supple  Skin:     General: Skin is warm and dry  Neurological:      General: No focal deficit present  Mental Status: He is alert and oriented to person, place, and time  Psychiatric:         Attention and Perception: Attention normal          Speech: Speech normal          Behavior: Behavior is cooperative  Cognition and Memory: Cognition and memory normal          Radiology and Laboratory:  I personally reviewed and interpreted the following results: brief review of CT scan chest abd pelvis    30+ minutes was spent face to face with his wife with greater than 50% of the time spent in counseling or coordination of care including discussions of etiology of diagnosis, treatment instructions, follow up requirements and support in serious illness  All of the patient's or agent's questions were answered during this discussion

## 2022-10-11 ENCOUNTER — TELEPHONE (OUTPATIENT)
Dept: PALLIATIVE MEDICINE | Facility: CLINIC | Age: 77
End: 2022-10-11

## 2022-10-11 ENCOUNTER — OFFICE VISIT (OUTPATIENT)
Dept: PALLIATIVE MEDICINE | Facility: CLINIC | Age: 77
End: 2022-10-11
Payer: MEDICARE

## 2022-10-11 VITALS
DIASTOLIC BLOOD PRESSURE: 60 MMHG | HEART RATE: 69 BPM | TEMPERATURE: 96 F | WEIGHT: 208.34 LBS | OXYGEN SATURATION: 98 % | SYSTOLIC BLOOD PRESSURE: 130 MMHG | BODY MASS INDEX: 26.75 KG/M2

## 2022-10-11 DIAGNOSIS — C67.3 MALIGNANT NEOPLASM OF ANTERIOR WALL OF URINARY BLADDER (HCC): ICD-10-CM

## 2022-10-11 DIAGNOSIS — G89.3 CANCER RELATED PAIN: ICD-10-CM

## 2022-10-11 DIAGNOSIS — C67.9 BLADDER CARCINOMA (HCC): ICD-10-CM

## 2022-10-11 DIAGNOSIS — F41.9 ANXIETY: ICD-10-CM

## 2022-10-11 DIAGNOSIS — R59.0 RETROPERITONEAL LYMPHADENOPATHY: Primary | ICD-10-CM

## 2022-10-11 PROBLEM — N30.01 ACUTE CYSTITIS WITH HEMATURIA: Status: RESOLVED | Noted: 2019-08-13 | Resolved: 2022-10-11

## 2022-10-11 PROCEDURE — 99214 OFFICE O/P EST MOD 30 MIN: CPT | Performed by: NURSE PRACTITIONER

## 2022-10-11 RX ORDER — ALPRAZOLAM 0.25 MG/1
0.5 TABLET ORAL
Qty: 30 TABLET | Refills: 0
Start: 2022-10-11

## 2022-10-11 NOTE — TELEPHONE ENCOUNTER
Patient will like next scripts to be send to [address] [place] [address] [place] phone number: [phone]

## 2022-10-13 ENCOUNTER — HOSPITAL ENCOUNTER (OUTPATIENT)
Dept: INFUSION CENTER | Facility: HOSPITAL | Age: 77
End: 2022-10-13
Payer: MEDICARE

## 2022-10-13 DIAGNOSIS — C67.3 MALIGNANT NEOPLASM OF ANTERIOR WALL OF URINARY BLADDER (HCC): ICD-10-CM

## 2022-10-13 LAB
ALBUMIN SERPL BCP-MCNC: 3.5 G/DL (ref 3.5–5)
ALP SERPL-CCNC: 121 U/L (ref 43–122)
ALT SERPL W P-5'-P-CCNC: 39 U/L
ANION GAP SERPL CALCULATED.3IONS-SCNC: 9 MMOL/L (ref 5–14)
AST SERPL W P-5'-P-CCNC: 40 U/L (ref 17–59)
BASOPHILS # BLD AUTO: 0.05 THOUSANDS/ΜL (ref 0–0.1)
BASOPHILS NFR BLD AUTO: 1 % (ref 0–1)
BILIRUB SERPL-MCNC: 0.3 MG/DL (ref 0.2–1)
BUN SERPL-MCNC: 35 MG/DL (ref 5–25)
CALCIUM SERPL-MCNC: 8.2 MG/DL (ref 8.4–10.2)
CHLORIDE SERPL-SCNC: 104 MMOL/L (ref 96–108)
CO2 SERPL-SCNC: 24 MMOL/L (ref 21–32)
CREAT SERPL-MCNC: 2.49 MG/DL (ref 0.7–1.5)
EOSINOPHIL # BLD AUTO: 0.14 THOUSAND/ΜL (ref 0–0.61)
EOSINOPHIL NFR BLD AUTO: 3 % (ref 0–6)
ERYTHROCYTE [DISTWIDTH] IN BLOOD BY AUTOMATED COUNT: 15.5 % (ref 11.6–15.1)
GFR SERPL CREATININE-BSD FRML MDRD: 23 ML/MIN/1.73SQ M
GLUCOSE SERPL-MCNC: 194 MG/DL (ref 70–99)
HCT VFR BLD AUTO: 28.6 % (ref 36.5–49.3)
HGB BLD-MCNC: 9.4 G/DL (ref 12–17)
IMM GRANULOCYTES # BLD AUTO: 0.03 THOUSAND/UL (ref 0–0.2)
IMM GRANULOCYTES NFR BLD AUTO: 1 % (ref 0–2)
LYMPHOCYTES # BLD AUTO: 1.5 THOUSANDS/ΜL (ref 0.6–4.47)
LYMPHOCYTES NFR BLD AUTO: 27 % (ref 14–44)
MCH RBC QN AUTO: 30.2 PG (ref 26.8–34.3)
MCHC RBC AUTO-ENTMCNC: 32.9 G/DL (ref 31.4–37.4)
MCV RBC AUTO: 92 FL (ref 82–98)
MONOCYTES # BLD AUTO: 0.58 THOUSAND/ΜL (ref 0.17–1.22)
MONOCYTES NFR BLD AUTO: 11 % (ref 4–12)
NEUTROPHILS # BLD AUTO: 3.23 THOUSANDS/ΜL (ref 1.85–7.62)
NEUTS SEG NFR BLD AUTO: 57 % (ref 43–75)
NRBC BLD AUTO-RTO: 0 /100 WBCS
PLATELET # BLD AUTO: 272 THOUSANDS/UL (ref 149–390)
PMV BLD AUTO: 10.4 FL (ref 8.9–12.7)
POTASSIUM SERPL-SCNC: 4.1 MMOL/L (ref 3.5–5.3)
PROT SERPL-MCNC: 6.9 G/DL (ref 6.4–8.4)
RBC # BLD AUTO: 3.11 MILLION/UL (ref 3.88–5.62)
SODIUM SERPL-SCNC: 137 MMOL/L (ref 135–147)
WBC # BLD AUTO: 5.53 THOUSAND/UL (ref 4.31–10.16)

## 2022-10-13 PROCEDURE — 85025 COMPLETE CBC W/AUTO DIFF WBC: CPT | Performed by: INTERNAL MEDICINE

## 2022-10-13 PROCEDURE — 80053 COMPREHEN METABOLIC PANEL: CPT | Performed by: INTERNAL MEDICINE

## 2022-10-14 ENCOUNTER — TELEPHONE (OUTPATIENT)
Dept: HEMATOLOGY ONCOLOGY | Facility: CLINIC | Age: 77
End: 2022-10-14

## 2022-10-14 ENCOUNTER — TELEPHONE (OUTPATIENT)
Dept: VASCULAR SURGERY | Facility: CLINIC | Age: 77
End: 2022-10-14

## 2022-10-14 ENCOUNTER — APPOINTMENT (OUTPATIENT)
Dept: LAB | Facility: CLINIC | Age: 77
End: 2022-10-14
Payer: MEDICARE

## 2022-10-14 ENCOUNTER — HOSPITAL ENCOUNTER (OUTPATIENT)
Dept: INFUSION CENTER | Facility: HOSPITAL | Age: 77
End: 2022-10-14
Attending: INTERNAL MEDICINE
Payer: MEDICARE

## 2022-10-14 ENCOUNTER — OFFICE VISIT (OUTPATIENT)
Dept: HEMATOLOGY ONCOLOGY | Facility: CLINIC | Age: 77
End: 2022-10-14
Payer: MEDICARE

## 2022-10-14 ENCOUNTER — TELEPHONE (OUTPATIENT)
Dept: OTHER | Facility: OTHER | Age: 77
End: 2022-10-14

## 2022-10-14 ENCOUNTER — NURSE TRIAGE (OUTPATIENT)
Dept: OTHER | Facility: OTHER | Age: 77
End: 2022-10-14

## 2022-10-14 VITALS
HEART RATE: 71 BPM | DIASTOLIC BLOOD PRESSURE: 64 MMHG | SYSTOLIC BLOOD PRESSURE: 126 MMHG | TEMPERATURE: 97.7 F | WEIGHT: 203 LBS | RESPIRATION RATE: 16 BRPM | BODY MASS INDEX: 26.05 KG/M2 | OXYGEN SATURATION: 98 % | HEIGHT: 74 IN

## 2022-10-14 VITALS — WEIGHT: 203 LBS | BODY MASS INDEX: 26.05 KG/M2 | HEIGHT: 74 IN

## 2022-10-14 DIAGNOSIS — N30.00 ACUTE CYSTITIS WITHOUT HEMATURIA: ICD-10-CM

## 2022-10-14 DIAGNOSIS — C67.3 MALIGNANT NEOPLASM OF ANTERIOR WALL OF URINARY BLADDER (HCC): Primary | ICD-10-CM

## 2022-10-14 DIAGNOSIS — N30.00 ACUTE CYSTITIS WITHOUT HEMATURIA: Primary | ICD-10-CM

## 2022-10-14 LAB
BACTERIA UR QL AUTO: ABNORMAL /HPF
BILIRUB UR QL STRIP: NEGATIVE
CLARITY UR: ABNORMAL
COLOR UR: ABNORMAL
GLUCOSE UR STRIP-MCNC: NEGATIVE MG/DL
HGB UR QL STRIP.AUTO: ABNORMAL
KETONES UR STRIP-MCNC: NEGATIVE MG/DL
LEUKOCYTE ESTERASE UR QL STRIP: ABNORMAL
NITRITE UR QL STRIP: POSITIVE
NON-SQ EPI CELLS URNS QL MICRO: ABNORMAL /HPF
PH UR STRIP.AUTO: 6 [PH]
PROT UR STRIP-MCNC: ABNORMAL MG/DL
RBC #/AREA URNS AUTO: ABNORMAL /HPF
SP GR UR STRIP.AUTO: 1.01 (ref 1–1.03)
UROBILINOGEN UR STRIP-ACNC: <2 MG/DL
WBC #/AREA URNS AUTO: ABNORMAL /HPF
WBC CLUMPS # UR AUTO: PRESENT /UL

## 2022-10-14 PROCEDURE — 96413 CHEMO IV INFUSION 1 HR: CPT

## 2022-10-14 PROCEDURE — 87077 CULTURE AEROBIC IDENTIFY: CPT

## 2022-10-14 PROCEDURE — 96367 TX/PROPH/DG ADDL SEQ IV INF: CPT

## 2022-10-14 PROCEDURE — 81001 URINALYSIS AUTO W/SCOPE: CPT

## 2022-10-14 PROCEDURE — 87186 SC STD MICRODIL/AGAR DIL: CPT

## 2022-10-14 PROCEDURE — 87086 URINE CULTURE/COLONY COUNT: CPT

## 2022-10-14 PROCEDURE — 99214 OFFICE O/P EST MOD 30 MIN: CPT | Performed by: INTERNAL MEDICINE

## 2022-10-14 RX ORDER — SODIUM CHLORIDE 9 MG/ML
20 INJECTION, SOLUTION INTRAVENOUS ONCE
Status: COMPLETED | OUTPATIENT
Start: 2022-10-14 | End: 2022-10-14

## 2022-10-14 RX ADMIN — SODIUM CHLORIDE 20 ML/HR: 0.9 INJECTION, SOLUTION INTRAVENOUS at 10:51

## 2022-10-14 RX ADMIN — ENFORTUMAB VEDOTIN 120 MG: 30 INJECTION, POWDER, LYOPHILIZED, FOR SOLUTION INTRAVENOUS at 11:44

## 2022-10-14 RX ADMIN — DEXAMETHASONE SODIUM PHOSPHATE: 10 INJECTION, SOLUTION INTRAMUSCULAR; INTRAVENOUS at 10:51

## 2022-10-14 NOTE — TELEPHONE ENCOUNTER
Spoke with Michael Zamudio and Brock Oliveira was with him  Both stated they did not leave a message and was likely their grand daughter  Advised them I will call her   LVM for Doris, patient's grand daughter to call back  Direct teams number provided  Patient is scheduled for a CT Scan 1/3, he will not require labs as patient is due to get labs in November  Radiology requires BMP within 3 months of the scan

## 2022-10-14 NOTE — TELEPHONE ENCOUNTER
Left message for patient's granddaughter about scheduled CT for January  Provided details of appointment date, time, and location  Provided callback number in case this scheduled appointment does not work

## 2022-10-14 NOTE — Clinical Note
Nicole Gonzalez,  Can your office schedule to see Marianne Childress in the clinic? He hasn't been seen in some time and I wanted him to get evaluation to touch base  He has had some hematuria recently  He  is otherwise having a good response to Enfortumab    Siddhartha Singh Patient has an appointment on 11/16/20 with Dr Medley.

## 2022-10-14 NOTE — TELEPHONE ENCOUNTER
CALL RETURN FORM   Reason for patient call? Patient's grand daughter, Gilma Tang, is calling in  inquiring whether he will need labs completed prior to his CT scan since patient has a port    Patient's primary oncologist?  Dr Haleigh Chen    Name of person the patient was calling for? [place]    Any additional information to add, if applicable? Patient has a port and likes to get his blood drawn at the [address] [place] [address] office    Informed patient that the message will be forwarded to the team and someone will get back to them as soon as possible    Did you relay this information to the patient?  Yes, Gilma Tang can be reached back at  [phone]

## 2022-10-14 NOTE — TELEPHONE ENCOUNTER
Reason for Disposition  • All other males with painful urination, or patient wants to be seen    Additional Information  • Pain or burning with passing urine (urination) and male    Answer Assessment - Initial Assessment Questions  1  SYMPTOM: "What's the main symptom you're concerned about?" (e g , frequency, incontinence)      Burning with urination, frequency, blood in urine   2  ONSET: "When did the urinary symptoms  start?"      2 weeks ago   3  PAIN: "Is there any pain?" If Yes, ask: "How bad is it?" (Scale: 1-10; mild, moderate, severe)      Burning with urination, 8 out of 10   4   CAUSE: "What do you think is causing the symptoms?"      Possible UTI   5  OTHER SYMPTOMS: "Do you have any other symptoms?" (e g , fever, flank pain, blood in urine, pain with urination)      Cloudy urine, foul odor    Protocols used: URINATION PAIN - MALE-ADULT-OH, URINARY SYMPTOMS-ADULT-OH

## 2022-10-14 NOTE — TELEPHONE ENCOUNTER
Spoke to pt's granddaughter and advised UA and UC orders were placed in his chart  She asked if she would be called over the weekend with results  Advised she can call our main office number to check for results and possible treatment if needed

## 2022-10-15 ENCOUNTER — NURSE TRIAGE (OUTPATIENT)
Dept: OTHER | Facility: OTHER | Age: 77
End: 2022-10-15

## 2022-10-15 NOTE — TELEPHONE ENCOUNTER
Regarding: ongoing UTI symptoms; awaiting UA/urine C&S results  ----- Message from Natividad Tovar sent at 10/15/2022 10:18 AM EDT -----  "I am still having symptoms of a UTI; I had urine tests done and was told to call for the results this morning "

## 2022-10-15 NOTE — TELEPHONE ENCOUNTER
Per OSWALDO Page, culture result has to be ready in order to prescribe the correct antibiotic for UTI due to patient's health history  Patient to go to ER if develops the fever  Patient agreeable with instructions  Patient has a urology appointment on Monday with Dr Yudelka Jimenez

## 2022-10-15 NOTE — TELEPHONE ENCOUNTER
Pts family member answered the call stating pt is not available  Could not give any other call back or contact information

## 2022-10-15 NOTE — TELEPHONE ENCOUNTER
Reason for Disposition  • Urinating more frequently than usual (i e , frequency)    Additional Information  • Negative:  All other urine symptoms    Protocols used: URINARY SYMPTOMS-ADULT-[place]

## 2022-10-16 LAB
BACTERIA UR CULT: ABNORMAL
BACTERIA UR CULT: ABNORMAL

## 2022-10-17 ENCOUNTER — OFFICE VISIT (OUTPATIENT)
Dept: UROLOGY | Facility: MEDICAL CENTER | Age: 77
End: 2022-10-17
Payer: MEDICARE

## 2022-10-17 VITALS
BODY MASS INDEX: 24.96 KG/M2 | DIASTOLIC BLOOD PRESSURE: 72 MMHG | WEIGHT: 205 LBS | HEIGHT: 76 IN | SYSTOLIC BLOOD PRESSURE: 124 MMHG | HEART RATE: 83 BPM | OXYGEN SATURATION: 99 %

## 2022-10-17 DIAGNOSIS — N39.0 URINARY TRACT INFECTION WITH HEMATURIA, SITE UNSPECIFIED: Primary | ICD-10-CM

## 2022-10-17 DIAGNOSIS — R31.9 URINARY TRACT INFECTION WITH HEMATURIA, SITE UNSPECIFIED: Primary | ICD-10-CM

## 2022-10-17 PROCEDURE — 99213 OFFICE O/P EST LOW 20 MIN: CPT | Performed by: UROLOGY

## 2022-10-17 RX ORDER — SULFAMETHOXAZOLE AND TRIMETHOPRIM 800; 160 MG/1; MG/1
1 TABLET ORAL EVERY 12 HOURS SCHEDULED
Qty: 14 TABLET | Refills: 0 | Status: SHIPPED | OUTPATIENT
Start: 2022-10-17 | End: 2022-10-24

## 2022-10-17 NOTE — PROGRESS NOTES
HISTORY:    Patient feels he has a UTI, discomfort during urination  He voids very small amounts, quite often  Bilateral percutaneous nephrostomies are draining well  Recent urine culture shows E coli    See other notes regarding ongoing immune therapy, status of his bladder cancer with Mets  ASSESSMENT / PLAN:    Bactrim prescribed for the UTI  Reduce dose because of his renal insufficiency, once per day    The following portions of the patient's history were reviewed and updated as appropriate: allergies, current medications, past family history, past medical history, past social history, past surgical history and problem list     Review of Systems   All other systems reviewed and are negative  Objective:     Physical Exam  Constitutional:       Appearance: He is ill-appearing             No results found for: PSA]  BUN   Date Value Ref Range Status   10/13/2022 35 (H) 5 - 25 mg/dL Final   09/03/2015 19 5 - 25 mg/dL Final     Creatinine   Date Value Ref Range Status   10/13/2022 2 49 (H) 0 70 - 1 50 mg/dL Final     Comment:     Standardized to IDMS reference method   09/03/2015 1 37 (H) 0 60 - 1 30 mg/dL Final     Comment:     Standardized to IDMS reference method     No components found for: CBC      Patient Active Problem List   Diagnosis   • Coronary artery disease of native artery of native heart with stable angina pectoris ([facility] )   • Bladder carcinoma ([facility] )   • Hypertension with renal disease   • Hyperlipidemia   • Presence of stent in coronary artery   • Type 2 diabetes mellitus, with long-term current use of insulin ([facility])   • Primary osteoarthritis of left knee   • Cystitis due to intravesical BCG administration   • Degeneration of lumbar intervertebral disc   • Back pain   • Arteriosclerosis of artery of extremity ([facility])   • Stable angina pectoris ([facility])   • Herpes zoster without complication   • Localized edema   • Superficial phlebitis   • Preop examination   • Acute renal failure (ARF) ([facility] )   • Secondary renal hyperparathyroidism ([facility])   • Malignant neoplasm of anterior wall of urinary bladder ([facility])   • Abnormal MRI, lumbar spine   • Diabetic polyneuropathy associated with type 2 diabetes mellitus ([facility] )   • Dysuria   • Diabetic ulcer of left foot associated with type 2 diabetes mellitus, 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 ([facility] )   • Acute kidney injury superimposed on CKD ([facility])   • Chronic anemia   • Anemia of chronic disease   • Preoperative clearance   • PAD (peripheral artery disease) ([facility])   • Left carotid bruit   • Gross hematuria   • Chronic bronchitis ([facility])   • Moderate major depression, single episode ([facility])   • Thrombocytopenia ([facility])   • 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   • Lumbar spondylosis   • Chronic pain syndrome   • Acute urinary retention   • Hematuria   • Continuous opioid dependence ([facility] )   • Port-A-Cath in place   • 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[facility] )   • Embolism and 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 ([facility])   • Abnormal CT scan, bladder   • Retained ureteral stent   • Fall   • Hyperkalemia   • CKD (chronic kidney disease) stage 4, GFR 15-29 ml/min ([facility])   • Bilateral hydronephrosis   • Cancer related pain   • Syncope and collapse   • Depression with suicidal ideation   • Pulmonary nodules   • Retroperitoneal lymphadenopathy   • Recurrent pleural effusion   • Shortness of breath   • History of tobacco use disorder        Diagnoses and all orders for this visit:    Urinary tract infection with hematuria, site unspecified  -     sulfamethoxazole-trimethoprim (BACTRIM DS) 800-160 mg per tablet; Take 1 tablet by mouth every 12 (twelve) hours for 7 days  -     Urine culture; Future           Patient ID: Paul Marin is a 76 y o  male        Current Outpatient Medications:   •  Accu-Chek Softclix Lancets lancets, Test blood sugar 4 times daily, Disp: 200 each, Rfl: 0  •  acetaminophen (TYLENOL) 325 mg tablet, Take 650 mg by mouth every 6 (six) hours as needed for mild pain  , Disp: , Rfl:   •  ALPRAZolam (XANAX) 0 25 mg tablet, Take 2 tablets (0 5 mg total) by mouth daily at bedtime as needed for anxiety, Disp: 30 tablet, Rfl: 0  •  aspirin (ECOTRIN LOW STRENGTH) 81 mg EC tablet, Take 1 tablet (81 mg total) by mouth daily, Disp: , Rfl:   •  Azelastine HCl 0 15 % SOLN, Inhale 1 spray 2 (two) times a day, Disp: 30 mL, Rfl: 3  •  Bimatoprost (LUMIGAN OP), Apply 1 drop to eye daily at bedtime , Disp: , Rfl:   •  calcitriol (ROCALTROL) 0 25 mcg capsule, Take 1 capsule (0 25 mcg total) by mouth daily, Disp: 90 capsule, Rfl: 0  •  DULoxetine (CYMBALTA) 30 mg delayed release capsule, TAKE ONE CAPSULE BY MOUTH EVERY DAY, Disp: 90 capsule, Rfl: 0  •  ezetimibe (ZETIA) 10 mg tablet, TAKE ONE TABLET BY MOUTH ONCE DAILY IN THE EARLY MORNING, Disp: 90 tablet, Rfl: 0  •  Ferrous Sulfate Dried (Feosol) 200 (65 Fe) MG TABS, Take 65 mg by mouth daily, Disp: 90 tablet, Rfl: 0  •  fluocinonide (LIDEX) 0 05 % cream, Apply topically 2 (two) times a day (Patient taking differently: Apply topically as needed), Disp: 30 g, Rfl: 0  •  fluticasone (FLONASE) 50 mcg/act nasal spray, 1 spray into each nostril daily (Patient taking differently: 1 spray into each nostril as needed), Disp: 11 mL, Rfl: 1  •  furosemide (LASIX) 20 mg tablet, Take 1 tablet (20 mg total) by mouth daily, Disp: 90 tablet, Rfl: 1  •  gabapentin (NEURONTIN) 300 mg capsule, TAKE ONE CAPSULE BY MOUTH EVERY DAY AT BEDTIME, Disp: 90 capsule, Rfl: 0  •  glucose blood (Accu-Chek Martha Plus) test strip, Test blood sugar 4 times daily, Disp: 200 strip, Rfl: 0  •  hydrocortisone 2 5 % cream, Apply topically 2 (two) times a day as needed for irritation or rash, Disp: 30 g, Rfl: 0  •  Insulin Pen Needle 31G X 8 MM MISC, Inject 3 times a day, Disp: 100 each, Rfl: 2  •  isosorbide mononitrate (IMDUR) 30 mg 24 hr tablet, TAKE ONE TABLET BY MOUTH EVERY DAY IN THE MORNING, Disp: 90 tablet, Rfl: 0  •  Lantus SoloStar 100 units/mL injection pen, 18 units qhs (Patient taking differently: Inject 18 Units under the skin daily at bedtime), Disp: 30 mL, Rfl: 1  •  lidocaine (XYLOCAINE) 2 % topical gel, Apply topically as needed for mild pain, Disp: 30 mL, Rfl: 0  •  lidocaine-prilocaine (EMLA) cream, Apply topically as needed for mild pain, Disp: 30 g, Rfl: 0  •  loperamide (IMODIUM) 2 mg capsule, Take 2 mg by mouth 4 (four) times a day as needed for diarrhea, Disp: , Rfl:   •  metoprolol succinate (TOPROL-XL) 100 mg 24 hr tablet, TAKE ONE TABLET BY MOUTH ONCE DAILY, Disp: 90 tablet, Rfl: 0  •  multivitamin (THERAGRAN) TABS, Take 1 tablet by mouth daily  , Disp: , Rfl:   •  naloxone (NARCAN) 4 mg/0 1 mL nasal spray, Administer 1 spray into a nostril   If no response after 2-3 minutes, give another dose in the other nostril using a new spray , Disp: 1 each, Rfl: 1  •  nitrofurantoin (MACROBID) 100 mg capsule, Take 1 capsule (100 mg total) by mouth 2 (two) times a day, Disp: 10 capsule, Rfl: 0  •  NovoLOG FlexPen 100 units/mL injection pen, 10 units with each meal  (Patient taking differently: Inject 10 Units under the skin 3 (three) times a day with meals), Disp: 5 pen, Rfl: 1  •  omeprazole (PriLOSEC) 20 mg delayed release capsule, Take 1 capsule (20 mg total) by mouth daily in the early morning PRN, Disp: 90 capsule, Rfl: 0  •  ondansetron (Zofran ODT) 8 mg disintegrating tablet, Take 1 tablet (8 mg total) by mouth every 8 (eight) hours as needed for nausea or vomiting, Disp: 20 tablet, Rfl: 0  •  oxybutynin (DITROPAN-XL) 10 MG 24 hr tablet, TAKE ONE TABLET BY MOUTH EVERY DAY, Disp: 30 tablet, Rfl: 0  •  oxyCODONE (OxyCONTIN) 10 mg 12 hr tablet, Take 1 tablet (10 mg total) by mouth every 8 (eight) hours Max Daily Amount: 30 mg, Disp: 90 tablet, Rfl: 0  •  oxyCODONE (Roxicodone) 5 immediate release tablet, Take 1 tablet (5 mg total) by mouth every 4 (four) hours as needed for moderate pain Max Daily Amount: 30 mg, Disp: 60 tablet, Rfl: 0  • Patiromer Sorbitex Calcium 8 4 g PACK, Take 8 4 packets by mouth, Disp: , Rfl:   •  phenazopyridine (PYRIDIUM) 200 mg tablet, Take 1 tablet (200 mg total) by mouth 3 (three) times a day with meals, Disp: 10 tablet, Rfl: 0  •  polyethylene glycol (MIRALAX) 17 g packet, Take 17 g by mouth daily, Disp: 30 each, Rfl: 0  •  predniSONE 20 mg tablet, Take 1 tablet (20 mg total) by mouth daily, Disp: 5 tablet, Rfl: 0  •  senna (SENOKOT) 8 6 MG tablet, Take 2 tablets (17 2 mg total) by mouth 2 (two) times a day, Disp: 90 tablet, Rfl: 0  •  sodium chloride, PF, 0 9 %, 10 mL by Intracatheter route daily Bilateral PCNs to be flushed daily with prefilled 10cc NS, Disp: 600 mL, Rfl: 3  •  sulfamethoxazole-trimethoprim (BACTRIM DS) 800-160 mg per tablet, Take 1 tablet by mouth every 12 (twelve) hours for 7 days, Disp: 14 tablet, Rfl: 0  •  ARIPiprazole (ABILIFY) 2 mg tablet, Take 1 tablet (2 mg total) by mouth daily, Disp: 30 tablet, Rfl: 0  •  sodium chloride, PF, 0 9 %, 10 mL by Intracatheter route daily Intracatheter flushing daily, Disp: 900 mL, Rfl: 0  •  sodium chloride, PF, 0 9 %, 10 mL by Intracatheter route daily Intracatheter flushing daily, Disp: 900 mL, Rfl: 0    Past Medical History:   Diagnosis Date   • Anemia     Last assessed: 9/28/17   • Anxiety    • Arteriosclerotic cardiovascular disease     Last assessed: 9/28/17   • Arthritis    • Bladder cancer (HCC)     bladder- had BCG treatments   • Chronic kidney disease     Stage IV   • CKD (chronic kidney disease) stage 4, GFR 15-29 ml/min (HCC)    • Colon polyp    • Coronary artery disease     7 stents   • Depression    • Diabetes mellitus (HCC)     IDDM   • GERD (gastroesophageal reflux disease)    • Glaucoma    • Hematuria    • History of fusion of cervical spine    • Hyperlipidemia    • Hypertension    • Insomnia     Last assessed: 11/14/12   • Loss of hearing     has hearing aids but usually does not wear them   • Other seasonal allergic rhinitis     Last assessed: 2/10/16   • PAD (peripheral artery disease) ([facility])    • Shortness of breath     on exertion   • Spinal stenosis of lumbar region    • Transient cerebral ischemia     No Residual   • Uses walker     w/c for longer distances       Past Surgical History:   Procedure Laterality Date   • CARDIAC SURGERY      Cath stent placement  Last assessed: 3/9/17  Interventional Catheterization   • CHOLECYSTECTOMY     • COLONOSCOPY     • CYSTOSCOPY      Diagnostic w/biopsy  Marvell Severs  Last assessed: 12/1/14   • CYSTOSCOPY N/A 4/12/2022    Procedure: CYSTOSCOPY  Bladder biopsies  ;  Surgeon: Alannah Lowe MD;  [facility];  Service: Urology   • CYSTOSCOPY W/ RETROGRADES Right 3/1/2022    Procedure: CYSTO; stent removal retrograde;  Surgeon: Alannah Lowe MD;  [facility];  Service: Urology   • CYSTOSCOPY W/ URETERAL STENT PLACEMENT Bilateral 10/18/2021    Procedure: bilateral retrogrades, cytology collection;  Surgeon: Alannah Lowe MD;  [facility];  Service: Urology   • CYSTOURETHROSCOPY      w/cautery  Marvell Severs   • FL RETROGRADE PYELOGRAM  10/18/2021   • FL RETROGRADE PYELOGRAM  10/24/2021   • GASTRIC BYPASS      For morbid obesity w/Shaji-en-Y   Resolved: 11/17/09   • INCISION AND DRAINAGE OF WOUND Right 2/26/2017    Procedure: INCISION AND DRAINAGE (I&D) EXTREMITY WITH APPLICATION OF GRAFT JACKET;  Surgeon: Annette Cordero DPM;  [facility];  Service:    • INCISION AND DRAINAGE OF WOUND Right 4/25/2017    Procedure: INCISION AND DRAINAGE (I&D) EXTREMITY, APPLICATION OF GRAFT;  Surgeon: Annette Cordero DPM;  [facility];  Service:    • IR BIOPSY OTHER  7/2/2020   • IR LOWER EXTREMITY ANGIOGRAM  2/8/2021   • IR LOWER EXTREMITY ANGIOGRAM  2/11/2021   • IR NEPHROSTOMY TUBE CHECK/CHANGE/REPOSITION/REINSERTION/UPSIZE  4/28/2022   • IR NEPHROSTOMY TUBE CHECK/CHANGE/REPOSITION/REINSERTION/UPSIZE  5/24/2022   • IR NEPHROSTOMY TUBE CHECK/CHANGE/REPOSITION/REINSERTION/UPSIZE  6/7/2022   • IR NEPHROSTOMY TUBE CHECK/CHANGE/REPOSITION/REINSERTION/UPSIZE  7/28/2022   • IR NEPHROSTOMY TUBE PLACEMENT  2/25/2022   • IR PORT PLACEMENT  1/17/2022   • IR THORACENTESIS  9/2/2022   • IR THORACENTESIS  9/14/2022   • IR TUNNELED CENTRAL LINE PLACEMENT  12/24/2020   • JOINT REPLACEMENT      christofer knees replaced   • MD AMPUTATION METATARSAL+TOE,SINGLE Left 12/21/2020    Procedure: RAY RESECTION FOOT;  Surgeon: Doyle Dozier DPM;  [facility];  Service: Podiatry   • MD AMPUTATION METATARSAL+TOE,SINGLE Left 12/31/2020    Procedure: 5TH MET RESECTION;  Surgeon: Doyle Dozier DPM;  [facility];  Service: Podiatry   • MD CYSTOURETHROSCOPY W/IRRIG & EVAC CLOTS N/A 2/10/2021    Procedure: CYSTOSCOPY EVACUATION OF CLOTS, fulguration;  Surgeon: Lexie Morrison MD;  [facility];  Service: Urology   • MD CYSTOURETHROSCOPY W/IRRIG & EVAC CLOTS N/A 10/24/2021    Procedure: CYSTOSCOPY EVACUATION OF CLOT, fulguration of bleeding vessels, right ureter stent placement, retrograde pyelogram;  Surgeon: Mila Bentley MD;  [facility];  Service: Urology   • MD CYSTOURETHROSCOPY,BIOPSY N/A 8/16/2016    Procedure: CYSTOSCOPY WITH BIOPSIES;  Surgeon: Heydi Alarcon MD;  [facility];  Service: Urology   • MD CYSTOURETHROSCOPY,FULGUR <0 5 CM LESN N/A 11/19/2020    Procedure: CYSTO W/BIOPSIES, transurethral prostate bx;  Surgeon: Ferrel Boxer, MD;  [facility];  Service: Urology   • MD CYSTOURETHROSCOPY,FULGUR >5 CM LESN Bilateral 10/18/2021    Procedure: TRANSURETHRAL RESECTION OF BLADDER TUMOR (TURBT);   Surgeon: Kera Martinez MD;  [facility];  Service: Urology   • MD Morales Araiza 3RD+ ORD [address] PEL/TR [facility] Left 2/8/2021    Procedure: LEG angiogram, CO2 w/limited contrast with balloon angioplasty postertior tibial artery;  Surgeon: Cecilio Romero MD;  [facility];  Service: Vascular   • ROTATOR CUFF REPAIR     • SMALL INTESTINE SURGERY      Surgery Shaji-en-Y   • SPINAL FUSION      lumbar and cervical fusions   • VAC DRESSING APPLICATION Right 1/69/3603    Procedure: APPLICATION VAC DRESSING;  Surgeon: Felicity Vásquez DPM;  [facility];  Service:    • WOUND DEBRIDEMENT Left 2/16/2021    Procedure: FOOT DEBRIDE, [address] OUT w/graft application;  Surgeon: Felicity Vásquez DPM;  [facility];  Service: Podiatry       Social History

## 2022-10-18 ENCOUNTER — TELEPHONE (OUTPATIENT)
Dept: INTERNAL MEDICINE CLINIC | Facility: CLINIC | Age: 77
End: 2022-10-18

## 2022-10-18 NOTE — TELEPHONE ENCOUNTER
Call from  Jenetta Shone granddaughter telephone [phone]      :  Upset because I told her grandfather not to drive until he has a full code cardiac workup that was the recommended by the cardiologist back in May    Recent he has had syncopal episodes    He needs to have a loop recorder and Cardiology evaluation make sure he does not need a pacemaker    Labs review the note from May 19, 2022 from Cardiology Dr Aravind Chin at [facility]    Quitting from the note on May 19th he has had several episodes of syncope without Premonition    He will wear it 2 weeks cardiac monitor    If he has no symptoms during the time we would recommend placement of implanted loop recorder    I am concerned that his syncopal without premonition may be a marker for for periods of high degree heart block    I try to explain that to the granddaughter she is very upset she is going to reach out to legal Consul if cardiologist does not clear him he is scheduled for loop recorder I believe she says in November this year    I again told the granddaughter to have the family drive him as a safety issue for him and the community I know her grandfather for over 27 years taking care of him    I understand he is going through a lot he has bladder cancer with metastatic disease renal failure frequent urinary tract infections coronary artery disease diabetes multiple comorbidities

## 2022-10-20 ENCOUNTER — HOSPITAL ENCOUNTER (OUTPATIENT)
Dept: INFUSION CENTER | Facility: HOSPITAL | Age: 77
End: 2022-10-20
Payer: MEDICARE

## 2022-10-20 DIAGNOSIS — I45.9 STOKES-ADAMS SYNCOPE: ICD-10-CM

## 2022-10-20 DIAGNOSIS — N18.30 TYPE 2 DIABETES MELLITUS WITH STAGE 3 CHRONIC KIDNEY DISEASE, WITH LONG-TERM CURRENT USE OF INSULIN, UNSPECIFIED WHETHER STAGE 3A OR 3B CKD (HCC): ICD-10-CM

## 2022-10-20 DIAGNOSIS — C67.9 BLADDER CARCINOMA (HCC): ICD-10-CM

## 2022-10-20 DIAGNOSIS — N18.4 CKD (CHRONIC KIDNEY DISEASE) STAGE 4, GFR 15-29 ML/MIN (HCC): ICD-10-CM

## 2022-10-20 DIAGNOSIS — E11.22 TYPE 2 DIABETES MELLITUS WITH STAGE 3 CHRONIC KIDNEY DISEASE, WITH LONG-TERM CURRENT USE OF INSULIN, UNSPECIFIED WHETHER STAGE 3A OR 3B CKD (HCC): ICD-10-CM

## 2022-10-20 DIAGNOSIS — E78.2 MIXED HYPERLIPIDEMIA: ICD-10-CM

## 2022-10-20 DIAGNOSIS — C67.3 MALIGNANT NEOPLASM OF ANTERIOR WALL OF URINARY BLADDER (HCC): Primary | ICD-10-CM

## 2022-10-20 DIAGNOSIS — I25.118 CORONARY ARTERY DISEASE OF NATIVE ARTERY OF NATIVE HEART WITH STABLE ANGINA PECTORIS (HCC): ICD-10-CM

## 2022-10-20 DIAGNOSIS — I12.9 HYPERTENSION WITH RENAL DISEASE: ICD-10-CM

## 2022-10-20 DIAGNOSIS — D69.6 THROMBOCYTOPENIA (HCC): ICD-10-CM

## 2022-10-20 DIAGNOSIS — J90 RECURRENT PLEURAL EFFUSION: ICD-10-CM

## 2022-10-20 DIAGNOSIS — Z79.4 TYPE 2 DIABETES MELLITUS WITH STAGE 3 CHRONIC KIDNEY DISEASE, WITH LONG-TERM CURRENT USE OF INSULIN, UNSPECIFIED WHETHER STAGE 3A OR 3B CKD (HCC): ICD-10-CM

## 2022-10-20 DIAGNOSIS — Z95.828 PORT-A-CATH IN PLACE: ICD-10-CM

## 2022-10-20 LAB
ALBUMIN SERPL BCP-MCNC: 3.8 G/DL (ref 3.5–5)
ALP SERPL-CCNC: 108 U/L (ref 43–122)
ALT SERPL W P-5'-P-CCNC: 62 U/L
ANION GAP SERPL CALCULATED.3IONS-SCNC: 9 MMOL/L (ref 5–14)
AST SERPL W P-5'-P-CCNC: 56 U/L (ref 17–59)
BASOPHILS # BLD AUTO: 0.05 THOUSANDS/ÂΜL (ref 0–0.1)
BASOPHILS NFR BLD AUTO: 1 % (ref 0–1)
BILIRUB SERPL-MCNC: 0.4 MG/DL (ref 0.2–1)
BUN SERPL-MCNC: 35 MG/DL (ref 5–25)
CALCIUM SERPL-MCNC: 8.7 MG/DL (ref 8.4–10.2)
CHLORIDE SERPL-SCNC: 106 MMOL/L (ref 96–108)
CO2 SERPL-SCNC: 23 MMOL/L (ref 21–32)
CREAT SERPL-MCNC: 2.93 MG/DL (ref 0.7–1.5)
EOSINOPHIL # BLD AUTO: 0.19 THOUSAND/ÂΜL (ref 0–0.61)
EOSINOPHIL NFR BLD AUTO: 3 % (ref 0–6)
ERYTHROCYTE [DISTWIDTH] IN BLOOD BY AUTOMATED COUNT: 15.9 % (ref 11.6–15.1)
EST. AVERAGE GLUCOSE BLD GHB EST-MCNC: 160 MG/DL
GFR SERPL CREATININE-BSD FRML MDRD: 19 ML/MIN/1.73SQ M
GLUCOSE SERPL-MCNC: 100 MG/DL (ref 70–99)
HBA1C MFR BLD: 7.2 %
HCT VFR BLD AUTO: 30.2 % (ref 36.5–49.3)
HGB BLD-MCNC: 9.5 G/DL (ref 12–17)
IMM GRANULOCYTES # BLD AUTO: 0.04 THOUSAND/UL (ref 0–0.2)
IMM GRANULOCYTES NFR BLD AUTO: 1 % (ref 0–2)
LYMPHOCYTES # BLD AUTO: 1.4 THOUSANDS/ÂΜL (ref 0.6–4.47)
LYMPHOCYTES NFR BLD AUTO: 21 % (ref 14–44)
MAGNESIUM SERPL-MCNC: 2 MG/DL (ref 1.6–2.3)
MCH RBC QN AUTO: 30 PG (ref 26.8–34.3)
MCHC RBC AUTO-ENTMCNC: 31.5 G/DL (ref 31.4–37.4)
MCV RBC AUTO: 95 FL (ref 82–98)
MONOCYTES # BLD AUTO: 0.78 THOUSAND/ÂΜL (ref 0.17–1.22)
MONOCYTES NFR BLD AUTO: 11 % (ref 4–12)
NEUTROPHILS # BLD AUTO: 4.38 THOUSANDS/ÂΜL (ref 1.85–7.62)
NEUTS SEG NFR BLD AUTO: 63 % (ref 43–75)
NRBC BLD AUTO-RTO: 0 /100 WBCS
PLATELET # BLD AUTO: 236 THOUSANDS/UL (ref 149–390)
PMV BLD AUTO: 9.7 FL (ref 8.9–12.7)
POTASSIUM SERPL-SCNC: 4.4 MMOL/L (ref 3.5–5.3)
PROT SERPL-MCNC: 7.4 G/DL (ref 6.4–8.4)
RBC # BLD AUTO: 3.17 MILLION/UL (ref 3.88–5.62)
SODIUM SERPL-SCNC: 138 MMOL/L (ref 135–147)
TSH SERPL DL<=0.05 MIU/L-ACNC: 2.87 UIU/ML (ref 0.45–4.5)
WBC # BLD AUTO: 6.84 THOUSAND/UL (ref 4.31–10.16)

## 2022-10-20 PROCEDURE — 83036 HEMOGLOBIN GLYCOSYLATED A1C: CPT

## 2022-10-20 PROCEDURE — 83735 ASSAY OF MAGNESIUM: CPT

## 2022-10-20 PROCEDURE — 84443 ASSAY THYROID STIM HORMONE: CPT

## 2022-10-20 PROCEDURE — 85025 COMPLETE CBC W/AUTO DIFF WBC: CPT | Performed by: INTERNAL MEDICINE

## 2022-10-20 PROCEDURE — 80053 COMPREHEN METABOLIC PANEL: CPT

## 2022-10-20 RX ORDER — EZETIMIBE 10 MG/1
10 TABLET ORAL DAILY
Qty: 90 TABLET | Refills: 1 | Status: SHIPPED | OUTPATIENT
Start: 2022-10-20

## 2022-10-21 ENCOUNTER — HOSPITAL ENCOUNTER (OUTPATIENT)
Dept: INFUSION CENTER | Facility: HOSPITAL | Age: 77
End: 2022-10-21
Attending: INTERNAL MEDICINE
Payer: MEDICARE

## 2022-10-21 VITALS
OXYGEN SATURATION: 99 % | TEMPERATURE: 96.6 F | HEART RATE: 73 BPM | SYSTOLIC BLOOD PRESSURE: 152 MMHG | DIASTOLIC BLOOD PRESSURE: 88 MMHG

## 2022-10-21 DIAGNOSIS — C67.3 MALIGNANT NEOPLASM OF ANTERIOR WALL OF URINARY BLADDER (HCC): Primary | ICD-10-CM

## 2022-10-21 PROCEDURE — 96413 CHEMO IV INFUSION 1 HR: CPT

## 2022-10-21 PROCEDURE — 96367 TX/PROPH/DG ADDL SEQ IV INF: CPT

## 2022-10-21 RX ORDER — SODIUM CHLORIDE 9 MG/ML
20 INJECTION, SOLUTION INTRAVENOUS ONCE
Status: COMPLETED | OUTPATIENT
Start: 2022-10-21 | End: 2022-10-21

## 2022-10-21 RX ADMIN — DEXAMETHASONE SODIUM PHOSPHATE: 10 INJECTION, SOLUTION INTRAMUSCULAR; INTRAVENOUS at 11:26

## 2022-10-21 RX ADMIN — ENFORTUMAB VEDOTIN 120 MG: 30 INJECTION, POWDER, LYOPHILIZED, FOR SOLUTION INTRAVENOUS at 12:07

## 2022-10-21 RX ADMIN — SODIUM CHLORIDE 20 ML/HR: 0.9 INJECTION, SOLUTION INTRAVENOUS at 11:15

## 2022-10-21 NOTE — PROGRESS NOTES
Received notification from infusion RN, Toni Crow in regards to patient experiencing some s/s  Message received, "We have been messaging on his last few treatments about the skin discoloration on his belly  I was just asking him how that is, and its the same  But now both of his arms have dark skin discoloration that is really dry and the skin is peeling off both of his arms  Any chance this is from the [facility]?" Reviewed message with Dr Cindy Celis, and confirmed that what patient is experiencing is from the [facility]  Dr Cindy Celis reviewed and stated that patient is ok to treat as scheduled  Recommended scent free moisturizers, keeping open areas clean, utilize sensitive soap and to utilize Benadryl PRN for itchiness  Infusion RN notified  Asked for Infusion RN to have patient reach out to office on Monday with an update

## 2022-10-21 NOTE — PROGRESS NOTES
Pt tolerated Padcev without difficulty  Port flushed and deaccessed per protocol  Reinforced w/pt to keep areas of peeling skin clean, dry, and moisturized  Has already been using benadryl PRN for itching  Next appt confirmed and AVS provided  Left ambulatory with roller walker

## 2022-10-25 ENCOUNTER — RA CDI HCC (OUTPATIENT)
Dept: OTHER | Facility: HOSPITAL | Age: 77
End: 2022-10-25

## 2022-10-25 NOTE — PROGRESS NOTES
[facility]mand[facility]  coding opportunities     [id number]     Chart Reviewed number of suggestions sent to Provider: 2     Patients Insurance     Medicare Insurance: Medicare

## 2022-10-27 ENCOUNTER — HOSPITAL ENCOUNTER (OUTPATIENT)
Dept: INFUSION CENTER | Facility: HOSPITAL | Age: 77
End: 2022-10-27
Payer: MEDICARE

## 2022-10-27 DIAGNOSIS — C67.3 MALIGNANT NEOPLASM OF ANTERIOR WALL OF URINARY BLADDER (HCC): Primary | ICD-10-CM

## 2022-10-27 DIAGNOSIS — Z95.828 PORT-A-CATH IN PLACE: ICD-10-CM

## 2022-10-27 LAB
BASOPHILS # BLD AUTO: 0.04 THOUSANDS/ÂΜL (ref 0–0.1)
BASOPHILS NFR BLD AUTO: 1 % (ref 0–1)
EOSINOPHIL # BLD AUTO: 0.25 THOUSAND/ÂΜL (ref 0–0.61)
EOSINOPHIL NFR BLD AUTO: 3 % (ref 0–6)
ERYTHROCYTE [DISTWIDTH] IN BLOOD BY AUTOMATED COUNT: 16.6 % (ref 11.6–15.1)
HCT VFR BLD AUTO: 25.5 % (ref 36.5–49.3)
HGB BLD-MCNC: 8.5 G/DL (ref 12–17)
IMM GRANULOCYTES # BLD AUTO: 0.09 THOUSAND/UL (ref 0–0.2)
IMM GRANULOCYTES NFR BLD AUTO: 1 % (ref 0–2)
LYMPHOCYTES # BLD AUTO: 1.07 THOUSANDS/ÂΜL (ref 0.6–4.47)
LYMPHOCYTES NFR BLD AUTO: 14 % (ref 14–44)
MCH RBC QN AUTO: 31.4 PG (ref 26.8–34.3)
MCHC RBC AUTO-ENTMCNC: 33.3 G/DL (ref 31.4–37.4)
MCV RBC AUTO: 94 FL (ref 82–98)
MONOCYTES # BLD AUTO: 0.52 THOUSAND/ÂΜL (ref 0.17–1.22)
MONOCYTES NFR BLD AUTO: 7 % (ref 4–12)
NEUTROPHILS # BLD AUTO: 5.66 THOUSANDS/ÂΜL (ref 1.85–7.62)
NEUTS SEG NFR BLD AUTO: 74 % (ref 43–75)
NRBC BLD AUTO-RTO: 0 /100 WBCS
PLATELET # BLD AUTO: 195 THOUSANDS/UL (ref 149–390)
PMV BLD AUTO: 10 FL (ref 8.9–12.7)
RBC # BLD AUTO: 2.71 MILLION/UL (ref 3.88–5.62)
WBC # BLD AUTO: 7.63 THOUSAND/UL (ref 4.31–10.16)

## 2022-10-27 PROCEDURE — 85025 COMPLETE CBC W/AUTO DIFF WBC: CPT | Performed by: INTERNAL MEDICINE

## 2022-10-28 ENCOUNTER — HOSPITAL ENCOUNTER (OUTPATIENT)
Dept: INFUSION CENTER | Facility: HOSPITAL | Age: 77
End: 2022-10-28
Attending: INTERNAL MEDICINE
Payer: MEDICARE

## 2022-10-28 VITALS
RESPIRATION RATE: 20 BRPM | DIASTOLIC BLOOD PRESSURE: 85 MMHG | SYSTOLIC BLOOD PRESSURE: 130 MMHG | TEMPERATURE: 97.6 F | HEART RATE: 80 BPM

## 2022-10-28 DIAGNOSIS — C67.3 MALIGNANT NEOPLASM OF ANTERIOR WALL OF URINARY BLADDER (HCC): Primary | ICD-10-CM

## 2022-10-28 RX ORDER — SODIUM CHLORIDE 9 MG/ML
20 INJECTION, SOLUTION INTRAVENOUS ONCE
Status: COMPLETED | OUTPATIENT
Start: 2022-10-28 | End: 2022-10-28

## 2022-10-28 RX ADMIN — SODIUM CHLORIDE 20 ML/HR: 0.9 INJECTION, SOLUTION INTRAVENOUS at 10:40

## 2022-10-28 RX ADMIN — DEXAMETHASONE SODIUM PHOSPHATE: 10 INJECTION, SOLUTION INTRAMUSCULAR; INTRAVENOUS at 10:40

## 2022-10-28 RX ADMIN — ENFORTUMAB VEDOTIN 120 MG: 30 INJECTION, POWDER, LYOPHILIZED, FOR SOLUTION INTRAVENOUS at 11:32

## 2022-10-31 ENCOUNTER — OFFICE VISIT (OUTPATIENT)
Dept: INTERNAL MEDICINE CLINIC | Facility: CLINIC | Age: 77
End: 2022-10-31

## 2022-10-31 VITALS
WEIGHT: 212 LBS | RESPIRATION RATE: 13 BRPM | SYSTOLIC BLOOD PRESSURE: 130 MMHG | DIASTOLIC BLOOD PRESSURE: 78 MMHG | BODY MASS INDEX: 27.21 KG/M2 | HEART RATE: 84 BPM | TEMPERATURE: 97.6 F | HEIGHT: 74 IN

## 2022-10-31 DIAGNOSIS — L30.9 DERMATITIS: ICD-10-CM

## 2022-10-31 DIAGNOSIS — C67.9 BLADDER CARCINOMA (HCC): ICD-10-CM

## 2022-10-31 DIAGNOSIS — N18.4 CKD (CHRONIC KIDNEY DISEASE) STAGE 4, GFR 15-29 ML/MIN (HCC): ICD-10-CM

## 2022-10-31 DIAGNOSIS — I70.209 ARTERIOSCLEROSIS OF ARTERY OF EXTREMITY (HCC): ICD-10-CM

## 2022-10-31 DIAGNOSIS — E53.8 FOLATE DEFICIENCY: ICD-10-CM

## 2022-10-31 DIAGNOSIS — D64.9 ANEMIA, UNSPECIFIED TYPE: ICD-10-CM

## 2022-10-31 DIAGNOSIS — I45.9 STOKES-ADAMS SYNCOPE: ICD-10-CM

## 2022-10-31 DIAGNOSIS — E11.22 TYPE 2 DIABETES MELLITUS WITH STAGE 3 CHRONIC KIDNEY DISEASE, WITH LONG-TERM CURRENT USE OF INSULIN, UNSPECIFIED WHETHER STAGE 3A OR 3B CKD (HCC): Primary | ICD-10-CM

## 2022-10-31 DIAGNOSIS — N18.30 TYPE 2 DIABETES MELLITUS WITH STAGE 3 CHRONIC KIDNEY DISEASE, WITH LONG-TERM CURRENT USE OF INSULIN, UNSPECIFIED WHETHER STAGE 3A OR 3B CKD (HCC): Primary | ICD-10-CM

## 2022-10-31 DIAGNOSIS — H93.232 HEARING ABNORMALLY ACUTE, LEFT: ICD-10-CM

## 2022-10-31 DIAGNOSIS — Z79.4 TYPE 2 DIABETES MELLITUS WITH STAGE 3 CHRONIC KIDNEY DISEASE, WITH LONG-TERM CURRENT USE OF INSULIN, UNSPECIFIED WHETHER STAGE 3A OR 3B CKD (HCC): Primary | ICD-10-CM

## 2022-10-31 DIAGNOSIS — I25.10 CORONARY ARTERY DISEASE INVOLVING NATIVE HEART WITHOUT ANGINA PECTORIS, UNSPECIFIED VESSEL OR LESION TYPE: ICD-10-CM

## 2022-10-31 DIAGNOSIS — F32.1 MODERATE MAJOR DEPRESSION, SINGLE EPISODE (HCC): ICD-10-CM

## 2022-10-31 DIAGNOSIS — H61.23 CERUMEN DEBRIS ON TYMPANIC MEMBRANE OF BOTH EARS: ICD-10-CM

## 2022-10-31 DIAGNOSIS — E53.8 B12 DEFICIENCY: ICD-10-CM

## 2022-10-31 DIAGNOSIS — I25.118 CORONARY ARTERY DISEASE OF NATIVE ARTERY OF NATIVE HEART WITH STABLE ANGINA PECTORIS (HCC): ICD-10-CM

## 2022-10-31 DIAGNOSIS — E78.2 MIXED HYPERLIPIDEMIA: ICD-10-CM

## 2022-10-31 RX ORDER — EZETIMIBE 10 MG/1
10 TABLET ORAL DAILY
Qty: 90 TABLET | Refills: 1 | Status: SHIPPED | OUTPATIENT
Start: 2022-10-31

## 2022-10-31 RX ORDER — FUROSEMIDE 20 MG/1
20 TABLET ORAL DAILY
Qty: 90 TABLET | Refills: 1 | Status: SHIPPED | OUTPATIENT
Start: 2022-10-31

## 2022-10-31 RX ORDER — CETIRIZINE HYDROCHLORIDE 10 MG/1
10 TABLET, CHEWABLE ORAL DAILY
COMMUNITY

## 2022-10-31 NOTE — PATIENT INSTRUCTIONS
Will refer you to ENT concerning your decrease hearing you do have walks in your ears of the should be able to irrigated in the meantime you can use some peroxide    Get her COVID-19 booster bivalent     Order labs your CBC

## 2022-10-31 NOTE — PROGRESS NOTES
Assessment/Plan:      1  Anemia most likely from chemotherapy will do an anemia workup he is getting the CBC done on a regular basis  The granddaughter is here with him today  He has problems hearing specially on the left side on during examination he has some cerumen bilaterally I told him to use some peroxide we refer him to ENT for further evaluation and cerumen removal    He has developed a dermatitis in his arms from his the chemotherapy  I refer him to Dermatology at the request of his granddaughter which is an excellent idea  4  Diabetes he does have endocrinology follow-up he denies any hypoglycemia or hyperglycemia hemoglobin A1c 7 2 which is excellent  5  Coronary artery disease and blood pressure seems to be stable no angina no evidence of heart failure is euvolemic  He has no edema in the lower extremities      6  Anxiety depression he has been on Abilify he needs to go to a therapist for further evaluation and treatment he looks good    Labs review    Results for orders placed or performed during the hospital encounter of 10/27/22   CBC and differential   Result Value Ref Range    WBC 7 63 4 31 - 10 16 Thousand/uL    RBC 2 71 (L) 3 88 - 5 62 Million/uL    Hemoglobin 8 5 (L) 12 0 - 17 0 g/dL    Hematocrit 25 5 (L) 36 5 - 49 3 %    MCV 94 82 - 98 fL    MCH 31 4 26 8 - 34 3 pg    MCHC 33 3 31 4 - 37 4 g/dL    RDW 16 6 (H) 11 6 - 15 1 %    MPV 10 0 8 9 - 12 7 fL    Platelets 564 406 - 625 Thousands/uL    nRBC 0 /100 WBCs    Neutrophils Relative 74 43 - 75 %    Immat GRANS % 1 0 - 2 %    Lymphocytes Relative 14 14 - 44 %    Monocytes Relative 7 4 - 12 %    Eosinophils Relative 3 0 - 6 %    Basophils Relative 1 0 - 1 %    Neutrophils Absolute 5 66 1 85 - 7 62 Thousands/µL    Immature Grans Absolute 0 09 0 00 - 0 20 Thousand/uL    Lymphocytes Absolute 1 07 0 60 - 4 47 Thousands/µL    Monocytes Absolute 0 52 0 17 - 1 22 Thousand/µL    Eosinophils Absolute 0 25 0 00 - 0 61 Thousand/µL    Basophils Absolute 0 04 0 00 - 0 10 Thousands/µL     *Note: Due to a large number of results and/or encounters for the requested time period, some results have not been displayed  A complete set of results can be found in Results Review  No problem-specific Assessment & Plan notes found for this encounter  Diagnoses and all orders for this visit:    Type 2 diabetes mellitus with stage 3 chronic kidney disease, with long-term current use of insulin, unspecified whether stage 3a or 3b CKD ([facility] )    Coronary artery disease of native artery of native heart with stable angina pectoris (HCC)    Arteriosclerosis of artery of extremity (HCC)    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    Bladder carcinoma (HCC)    CKD (chronic kidney disease) stage 4, GFR 15-29 ml/min ([facility])    Mixed hyperlipidemia    Moderate major depression, single episode ([facility] )          Subjective:      Patient ID: Imer Juarez is a 76 y o  male  No chief complaint on file          Current Outpatient Medications:   •  Accu-Chek Softclix Lancets lancets, Test blood sugar 4 times daily, Disp: 200 each, Rfl: 0  •  acetaminophen (TYLENOL) 325 mg tablet, Take 650 mg by mouth every 6 (six) hours as needed for mild pain  , Disp: , Rfl:   •  ALPRAZolam (XANAX) 0 25 mg tablet, Take 2 tablets (0 5 mg total) by mouth daily at bedtime as needed for anxiety, Disp: 30 tablet, Rfl: 0  •  ARIPiprazole (ABILIFY) 2 mg tablet, Take 1 tablet (2 mg total) by mouth daily, Disp: 30 tablet, Rfl: 0  •  aspirin (ECOTRIN LOW STRENGTH) 81 mg EC tablet, Take 1 tablet (81 mg total) by mouth daily, Disp: , Rfl:   •  Azelastine HCl 0 15 % SOLN, Inhale 1 spray 2 (two) times a day, Disp: 30 mL, Rfl: 3  •  Bimatoprost (LUMIGAN OP), Apply 1 drop to eye daily at bedtime , Disp: , Rfl:   •  calcitriol (ROCALTROL) 0 25 mcg capsule, Take 1 capsule (0 25 mcg total) by mouth daily, Disp: 90 capsule, Rfl: 0  •  DULoxetine (CYMBALTA) 30 mg delayed release capsule, TAKE ONE CAPSULE BY MOUTH EVERY DAY, Disp: 90 capsule, Rfl: 0  •  ezetimibe (ZETIA) 10 mg tablet, Take 1 tablet (10 mg total) by mouth daily, Disp: 90 tablet, Rfl: 1  •  Ferrous Sulfate Dried (Feosol) 200 (65 Fe) MG TABS, Take 65 mg by mouth daily, Disp: 90 tablet, Rfl: 0  •  fluocinonide (LIDEX) 0 05 % cream, Apply topically 2 (two) times a day (Patient taking differently: Apply topically as needed), Disp: 30 g, Rfl: 0  •  fluticasone (FLONASE) 50 mcg/act nasal spray, 1 spray into each nostril daily (Patient taking differently: 1 spray into each nostril as needed), Disp: 11 mL, Rfl: 1  •  furosemide (LASIX) 20 mg tablet, Take 1 tablet (20 mg total) by mouth daily, Disp: 90 tablet, Rfl: 1  •  gabapentin (NEURONTIN) 300 mg capsule, TAKE ONE CAPSULE BY MOUTH EVERY DAY AT BEDTIME, Disp: 90 capsule, Rfl: 0  •  glucose blood (Accu-Chek Martha Plus) test strip, Test blood sugar 4 times daily, Disp: 200 strip, Rfl: 0  •  hydrocortisone 2 5 % cream, Apply topically 2 (two) times a day as needed for irritation or rash, Disp: 30 g, Rfl: 0  •  Insulin Pen Needle 31G X 8 MM MISC, Inject 3 times a day, Disp: 100 each, Rfl: 2  •  isosorbide mononitrate (IMDUR) 30 mg 24 hr tablet, TAKE ONE TABLET BY MOUTH EVERY DAY IN THE MORNING, Disp: 90 tablet, Rfl: 0  •  Lantus SoloStar 100 units/mL injection pen, 18 units qhs (Patient taking differently: Inject 18 Units under the skin daily at bedtime), Disp: 30 mL, Rfl: 1  •  lidocaine (XYLOCAINE) 2 % topical gel, Apply topically as needed for mild pain, Disp: 30 mL, Rfl: 0  •  lidocaine-prilocaine (EMLA) cream, Apply topically as needed for mild pain, Disp: 30 g, Rfl: 0  •  loperamide (IMODIUM) 2 mg capsule, Take 2 mg by mouth 4 (four) times a day as needed for diarrhea, Disp: , Rfl:   •  metoprolol succinate (TOPROL-XL) 100 mg 24 hr tablet, TAKE ONE TABLET BY MOUTH ONCE DAILY, Disp: 90 tablet, Rfl: 0  •  multivitamin (THERAGRAN) TABS, Take 1 tablet by mouth daily  , Disp: , Rfl:   •  naloxone (NARCAN) 4 mg/0 1 mL nasal spray, Administer 1 spray into a nostril   If no response after 2-3 minutes, give another dose in the other nostril using a new spray , Disp: 1 each, Rfl: 1  •  nitrofurantoin (MACROBID) 100 mg capsule, Take 1 capsule (100 mg total) by mouth 2 (two) times a day, Disp: 10 capsule, Rfl: 0  •  NovoLOG FlexPen 100 units/mL injection pen, 10 units with each meal  (Patient taking differently: Inject 10 Units under the skin 3 (three) times a day with meals), Disp: 5 pen, Rfl: 1  •  omeprazole (PriLOSEC) 20 mg delayed release capsule, Take 1 capsule (20 mg total) by mouth daily in the early morning PRN, Disp: 90 capsule, Rfl: 0  •  ondansetron (Zofran ODT) 8 mg disintegrating tablet, Take 1 tablet (8 mg total) by mouth every 8 (eight) hours as needed for nausea or vomiting, Disp: 20 tablet, Rfl: 0  •  oxybutynin (DITROPAN-XL) 10 MG 24 hr tablet, TAKE ONE TABLET BY MOUTH EVERY DAY, Disp: 30 tablet, Rfl: 0  •  oxyCODONE (OxyCONTIN) 10 mg 12 hr tablet, Take 1 tablet (10 mg total) by mouth every 8 (eight) hours Max Daily Amount: 30 mg, Disp: 90 tablet, Rfl: 0  •  oxyCODONE (Roxicodone) 5 immediate release tablet, Take 1 tablet (5 mg total) by mouth every 4 (four) hours as needed for moderate pain Max Daily Amount: 30 mg, Disp: 60 tablet, Rfl: 0  •  Patiromer Sorbitex Calcium 8 4 g PACK, Take 8 4 packets by mouth, Disp: , Rfl:   •  phenazopyridine (PYRIDIUM) 200 mg tablet, Take 1 tablet (200 mg total) by mouth 3 (three) times a day with meals, Disp: 10 tablet, Rfl: 0  •  polyethylene glycol (MIRALAX) 17 g packet, Take 17 g by mouth daily, Disp: 30 each, Rfl: 0  •  predniSONE 20 mg tablet, Take 1 tablet (20 mg total) by mouth daily, Disp: 5 tablet, Rfl: 0  •  senna (SENOKOT) 8 6 MG tablet, Take 2 tablets (17 2 mg total) by mouth 2 (two) times a day, Disp: 90 tablet, Rfl: 0  •  sodium chloride, PF, 0 9 %, 10 mL by Intracatheter route daily Intracatheter flushing daily, Disp: 900 mL, Rfl: 0  •  sodium chloride, PF, 0 9 %, 10 mL by Intracatheter route daily Intracatheter flushing daily, Disp: 900 mL, Rfl: 0  •  sodium chloride, PF, 0 9 %, 10 mL by Intracatheter route daily Bilateral PCNs to be flushed daily with prefilled 10cc NS, Disp: 600 mL, Rfl: 3    HPI    The following portions of the patient's history were reviewed and updated as appropriate: allergies, current medications, past family history, past medical history, past social history, past surgical history and problem list     Review of Systems   Constitutional: Negative  Negative for activity change, appetite change, fatigue, fever and unexpected weight change  HENT: Negative for congestion, ear pain, hearing loss, mouth sores, postnasal drip, rhinorrhea, sore throat, trouble swallowing and voice change  Decreasing hearing on the left side   Eyes: Negative for pain, redness and visual disturbance  Respiratory: Negative for cough, chest tightness, shortness of breath and wheezing  Cardiovascular: Negative for chest pain, palpitations and leg swelling  Gastrointestinal: Negative for abdominal distention, abdominal pain, blood in stool, constipation, diarrhea and nausea  Endocrine: Negative for cold intolerance, heat intolerance, polydipsia, polyphagia and polyuria  Genitourinary: Negative for difficulty urinating, dysuria, flank pain, frequency, hematuria and urgency  Musculoskeletal: Negative for arthralgias, back pain, gait problem, joint swelling and myalgias  Skin: Positive for rash  Negative for color change and pallor  Neurological: Negative for dizziness, tremors, seizures, syncope, weakness, numbness and headaches  Hematological: Negative for adenopathy  Does not bruise/bleed easily  Psychiatric/Behavioral: Negative  Negative for sleep disturbance  The patient is not nervous/anxious  Objective: There were no vitals taken for this visit  Physical Exam  Vitals and nursing note reviewed  Constitutional:       Appearance: He is well-developed     HENT: Head: Normocephalic  Right Ear: External ear normal       Left Ear: External ear normal       Nose: Nose normal       Mouth/Throat:      Pharynx: No oropharyngeal exudate  Eyes:      Conjunctiva/sclera: Conjunctivae normal       Pupils: Pupils are equal, round, and reactive to light  Neck:      Thyroid: No thyromegaly  Cardiovascular:      Rate and Rhythm: Normal rate and regular rhythm  Heart sounds: Normal heart sounds  No murmur heard  No friction rub  No gallop  Comments: S1-S2 regular rhythm  Extremities no edema    Pulmonary:      Effort: Pulmonary effort is normal  No respiratory distress  Breath sounds: Normal breath sounds  No wheezing or rales  Comments: Lungs are clear no wheezing rales or rhonchi  Abdominal:      General: Bowel sounds are normal  There is no distension  Palpations: Abdomen is soft  There is no mass  Tenderness: There is no abdominal tenderness  There is no guarding or rebound  Comments: Abdomen soft nontender   Musculoskeletal:         General: Normal range of motion  Cervical back: Normal range of motion and neck supple  Lymphadenopathy:      Cervical: No cervical adenopathy  Skin:     General: Skin is warm and dry  Findings: Rash present  Comments: Exfoliated dermatitis in the arms he gets is reaction after chemotherapy dermatology referral   Neurological:      Mental Status: He is alert and oriented to person, place, and time     Psychiatric:         Behavior: Behavior normal          Judgment: Judgment normal

## 2022-11-04 RX ORDER — SODIUM CHLORIDE 9 MG/ML
20 INJECTION, SOLUTION INTRAVENOUS ONCE
Status: CANCELLED | OUTPATIENT
Start: 2022-12-02

## 2022-11-04 RX ORDER — SODIUM CHLORIDE 9 MG/ML
20 INJECTION, SOLUTION INTRAVENOUS ONCE
Status: CANCELLED | OUTPATIENT
Start: 2022-11-25

## 2022-11-04 RX ORDER — SODIUM CHLORIDE 9 MG/ML
20 INJECTION, SOLUTION INTRAVENOUS ONCE
Status: CANCELLED | OUTPATIENT
Start: 2022-11-11

## 2022-11-07 ENCOUNTER — APPOINTMENT (OUTPATIENT)
Dept: LAB | Facility: CLINIC | Age: 77
End: 2022-11-07

## 2022-11-07 ENCOUNTER — TELEPHONE (OUTPATIENT)
Dept: HEMATOLOGY ONCOLOGY | Facility: CLINIC | Age: 77
End: 2022-11-07

## 2022-11-07 DIAGNOSIS — R31.9 URINARY TRACT INFECTION WITH HEMATURIA, SITE UNSPECIFIED: ICD-10-CM

## 2022-11-07 DIAGNOSIS — R09.89 LEFT CAROTID BRUIT: ICD-10-CM

## 2022-11-07 DIAGNOSIS — N13.30 BILATERAL HYDRONEPHROSIS: ICD-10-CM

## 2022-11-07 DIAGNOSIS — S31.109A MULTIPLE OPEN WOUNDS OF ABDOMEN: Primary | ICD-10-CM

## 2022-11-07 DIAGNOSIS — N39.0 URINARY TRACT INFECTION WITH HEMATURIA, SITE UNSPECIFIED: ICD-10-CM

## 2022-11-07 RX ORDER — OXYBUTYNIN CHLORIDE 10 MG/1
TABLET, EXTENDED RELEASE ORAL
Qty: 30 TABLET | Refills: 0 | Status: SHIPPED | OUTPATIENT
Start: 2022-11-07

## 2022-11-07 RX ORDER — METOPROLOL SUCCINATE 100 MG/1
TABLET, EXTENDED RELEASE ORAL
Qty: 90 TABLET | Refills: 0 | Status: SHIPPED | OUTPATIENT
Start: 2022-11-07

## 2022-11-07 NOTE — TELEPHONE ENCOUNTER
Received vm from patient, "Good morning, Jimmy Mahan  This is Shavon Newsome, patient at [facility]  I'm calling because I have two open wounds on my stomach and I wanted to know about going to wound care  Appreciate if you could give me a call back  I guess I'm going to need a referral to the to go to wound care to have them look at these open wounds I have? I can be reached at [phone]  Date of birth [phone]   Thank you "

## 2022-11-07 NOTE — TELEPHONE ENCOUNTER
Per Dr Ramez Montes will assess areas of concern during next appointment with Dr Ramez Montes  Referral placed to wound care to assist with management of areas of concern on abdomen  Patient on active chemotherapy and reported two open areas on Abdomen and requesting assistance with wound care management  Referral placed per Dr Ramez Montes recommendations  Call out to patient in regards to patient concerns  Left vm in regards to wound care referral and encouraged patient to call our office with additional questions and concerns  Left RN call back number and hours of operation to further discuss

## 2022-11-08 LAB — BACTERIA UR CULT: NORMAL

## 2022-11-09 ENCOUNTER — APPOINTMENT (OUTPATIENT)
Dept: RADIATION ONCOLOGY | Facility: HOSPITAL | Age: 77
End: 2022-11-09
Attending: INTERNAL MEDICINE

## 2022-11-10 ENCOUNTER — TELEPHONE (OUTPATIENT)
Dept: VASCULAR SURGERY | Facility: CLINIC | Age: 77
End: 2022-11-10

## 2022-11-10 ENCOUNTER — HOSPITAL ENCOUNTER (OUTPATIENT)
Dept: INFUSION CENTER | Facility: HOSPITAL | Age: 77
End: 2022-11-10

## 2022-11-10 ENCOUNTER — TELEPHONE (OUTPATIENT)
Dept: OTHER | Facility: OTHER | Age: 77
End: 2022-11-10

## 2022-11-10 DIAGNOSIS — E53.8 B12 DEFICIENCY: ICD-10-CM

## 2022-11-10 DIAGNOSIS — D64.9 ANEMIA, UNSPECIFIED TYPE: ICD-10-CM

## 2022-11-10 DIAGNOSIS — C67.9 BLADDER CARCINOMA (HCC): ICD-10-CM

## 2022-11-10 DIAGNOSIS — Z95.828 PORT-A-CATH IN PLACE: ICD-10-CM

## 2022-11-10 DIAGNOSIS — I70.209 ARTERIOSCLEROSIS OF ARTERY OF EXTREMITY (HCC): ICD-10-CM

## 2022-11-10 DIAGNOSIS — E11.22 TYPE 2 DIABETES MELLITUS WITH STAGE 3 CHRONIC KIDNEY DISEASE, WITH LONG-TERM CURRENT USE OF INSULIN, UNSPECIFIED WHETHER STAGE 3A OR 3B CKD (HCC): ICD-10-CM

## 2022-11-10 DIAGNOSIS — N18.30 TYPE 2 DIABETES MELLITUS WITH STAGE 3 CHRONIC KIDNEY DISEASE, WITH LONG-TERM CURRENT USE OF INSULIN, UNSPECIFIED WHETHER STAGE 3A OR 3B CKD (HCC): ICD-10-CM

## 2022-11-10 DIAGNOSIS — Z79.4 TYPE 2 DIABETES MELLITUS WITH STAGE 3 CHRONIC KIDNEY DISEASE, WITH LONG-TERM CURRENT USE OF INSULIN, UNSPECIFIED WHETHER STAGE 3A OR 3B CKD (HCC): ICD-10-CM

## 2022-11-10 DIAGNOSIS — N18.4 CKD (CHRONIC KIDNEY DISEASE) STAGE 4, GFR 15-29 ML/MIN (HCC): ICD-10-CM

## 2022-11-10 DIAGNOSIS — I25.118 CORONARY ARTERY DISEASE OF NATIVE ARTERY OF NATIVE HEART WITH STABLE ANGINA PECTORIS (HCC): ICD-10-CM

## 2022-11-10 DIAGNOSIS — C67.3 MALIGNANT NEOPLASM OF ANTERIOR WALL OF URINARY BLADDER (HCC): Primary | ICD-10-CM

## 2022-11-10 DIAGNOSIS — E53.8 FOLATE DEFICIENCY: ICD-10-CM

## 2022-11-10 LAB
ALBUMIN SERPL BCP-MCNC: 3.6 G/DL (ref 3.5–5)
ALP SERPL-CCNC: 109 U/L (ref 43–122)
ALT SERPL W P-5'-P-CCNC: 24 U/L
ANION GAP SERPL CALCULATED.3IONS-SCNC: 10 MMOL/L (ref 5–14)
AST SERPL W P-5'-P-CCNC: 50 U/L (ref 17–59)
BASOPHILS # BLD AUTO: 0.04 THOUSANDS/ÂΜL (ref 0–0.1)
BASOPHILS NFR BLD AUTO: 1 % (ref 0–1)
BILIRUB SERPL-MCNC: 0.32 MG/DL (ref 0.2–1)
BUN SERPL-MCNC: 39 MG/DL (ref 5–25)
CALCIUM SERPL-MCNC: 8.7 MG/DL (ref 8.4–10.2)
CHLORIDE SERPL-SCNC: 104 MMOL/L (ref 96–108)
CO2 SERPL-SCNC: 22 MMOL/L (ref 21–32)
CREAT SERPL-MCNC: 3.11 MG/DL (ref 0.7–1.5)
EOSINOPHIL # BLD AUTO: 0.07 THOUSAND/ÂΜL (ref 0–0.61)
EOSINOPHIL NFR BLD AUTO: 1 % (ref 0–6)
ERYTHROCYTE [DISTWIDTH] IN BLOOD BY AUTOMATED COUNT: 18.6 % (ref 11.6–15.1)
FERRITIN SERPL-MCNC: 153 NG/ML (ref 8–388)
FOLATE SERPL-MCNC: >20 NG/ML (ref 3.1–17.5)
GFR SERPL CREATININE-BSD FRML MDRD: 18 ML/MIN/1.73SQ M
GLUCOSE SERPL-MCNC: 187 MG/DL (ref 70–99)
HCT VFR BLD AUTO: 26 % (ref 36.5–49.3)
HGB BLD-MCNC: 8.5 G/DL (ref 12–17)
IMM GRANULOCYTES # BLD AUTO: 0.04 THOUSAND/UL (ref 0–0.2)
IMM GRANULOCYTES NFR BLD AUTO: 1 % (ref 0–2)
IRON SATN MFR SERPL: 19 % (ref 20–50)
IRON SERPL-MCNC: 43 UG/DL (ref 65–175)
LYMPHOCYTES # BLD AUTO: 1.08 THOUSANDS/ÂΜL (ref 0.6–4.47)
LYMPHOCYTES NFR BLD AUTO: 19 % (ref 14–44)
MCH RBC QN AUTO: 31 PG (ref 26.8–34.3)
MCHC RBC AUTO-ENTMCNC: 32.7 G/DL (ref 31.4–37.4)
MCV RBC AUTO: 95 FL (ref 82–98)
MONOCYTES # BLD AUTO: 0.77 THOUSAND/ÂΜL (ref 0.17–1.22)
MONOCYTES NFR BLD AUTO: 13 % (ref 4–12)
NEUTROPHILS # BLD AUTO: 3.79 THOUSANDS/ÂΜL (ref 1.85–7.62)
NEUTS SEG NFR BLD AUTO: 65 % (ref 43–75)
NRBC BLD AUTO-RTO: 0 /100 WBCS
PLATELET # BLD AUTO: 315 THOUSANDS/UL (ref 149–390)
PMV BLD AUTO: 10 FL (ref 8.9–12.7)
POTASSIUM SERPL-SCNC: 5 MMOL/L (ref 3.5–5.3)
PROT SERPL-MCNC: 7.1 G/DL (ref 6.4–8.4)
RBC # BLD AUTO: 2.74 MILLION/UL (ref 3.88–5.62)
RETICS # AUTO: ABNORMAL 10*3/UL (ref 14356–105094)
RETICS # CALC: 3.71 % (ref 0.37–1.87)
SODIUM SERPL-SCNC: 136 MMOL/L (ref 135–147)
TIBC SERPL-MCNC: 230 UG/DL (ref 250–450)
VIT B12 SERPL-MCNC: 1036 PG/ML (ref 100–900)
WBC # BLD AUTO: 5.79 THOUSAND/UL (ref 4.31–10.16)

## 2022-11-10 NOTE — PROGRESS NOTES
Pt tolerated central lab draw without difficulty  Port flushed and deaccessed per protocol  Aware of chemo appt time tomorrow  AVS declined  Left ambulatory with roller walker

## 2022-11-10 NOTE — TELEPHONE ENCOUNTER
Patient no showed for 11/1/22 doppler  OV on 11/14 cancelled  Called patient to r/s doppler and OV    Left message

## 2022-11-10 NOTE — TELEPHONE ENCOUNTER
Patient called saying that he's still waiting to get a call back from the office which he hasn't gotten back from the office regarding his urinary analysis he had done, patient stated that he is still having the UTI symptoms and is asking if the office can give him a call back regarding this matter

## 2022-11-10 NOTE — PROGRESS NOTES
Reviewed creatinine lab with Dr Ольга Munguia, per Dr Ольга Munguia, patient is okay to be treated as scheduled on 11/11/22 with creatinine of 3 11

## 2022-11-11 ENCOUNTER — TELEPHONE (OUTPATIENT)
Dept: UROLOGY | Facility: MEDICAL CENTER | Age: 77
End: 2022-11-11

## 2022-11-11 ENCOUNTER — OFFICE VISIT (OUTPATIENT)
Dept: WOUND CARE | Facility: CLINIC | Age: 77
End: 2022-11-11

## 2022-11-11 ENCOUNTER — HOSPITAL ENCOUNTER (OUTPATIENT)
Dept: INFUSION CENTER | Facility: HOSPITAL | Age: 77
End: 2022-11-11
Attending: INTERNAL MEDICINE

## 2022-11-11 VITALS
RESPIRATION RATE: 16 BRPM | SYSTOLIC BLOOD PRESSURE: 145 MMHG | DIASTOLIC BLOOD PRESSURE: 90 MMHG | TEMPERATURE: 95.6 F | HEART RATE: 84 BPM

## 2022-11-11 VITALS
DIASTOLIC BLOOD PRESSURE: 77 MMHG | TEMPERATURE: 97.7 F | SYSTOLIC BLOOD PRESSURE: 151 MMHG | HEART RATE: 92 BPM | RESPIRATION RATE: 14 BRPM

## 2022-11-11 DIAGNOSIS — N30.00 ACUTE CYSTITIS WITHOUT HEMATURIA: Primary | ICD-10-CM

## 2022-11-11 DIAGNOSIS — C67.3 MALIGNANT NEOPLASM OF ANTERIOR WALL OF URINARY BLADDER (HCC): Primary | ICD-10-CM

## 2022-11-11 DIAGNOSIS — Z87.828 HEALED WOUND: Primary | ICD-10-CM

## 2022-11-11 RX ORDER — SODIUM CHLORIDE 9 MG/ML
20 INJECTION, SOLUTION INTRAVENOUS ONCE
Status: COMPLETED | OUTPATIENT
Start: 2022-11-11 | End: 2022-11-11

## 2022-11-11 RX ORDER — SULFAMETHOXAZOLE AND TRIMETHOPRIM 800; 160 MG/1; MG/1
1 TABLET ORAL EVERY 12 HOURS SCHEDULED
Qty: 14 TABLET | Refills: 0 | Status: SHIPPED | OUTPATIENT
Start: 2022-11-11 | End: 2022-11-19

## 2022-11-11 RX ADMIN — SODIUM CHLORIDE 20 ML/HR: 0.9 INJECTION, SOLUTION INTRAVENOUS at 10:58

## 2022-11-11 RX ADMIN — ENFORTUMAB VEDOTIN 120 MG: 30 INJECTION, POWDER, LYOPHILIZED, FOR SOLUTION INTRAVENOUS at 11:40

## 2022-11-11 RX ADMIN — DEXAMETHASONE SODIUM PHOSPHATE: 10 INJECTION, SOLUTION INTRAMUSCULAR; INTRAVENOUS at 10:59

## 2022-11-11 NOTE — PROGRESS NOTES
Assessment/Plan:    No open wounds of the abdomen  No specific treatment at this time  Discharge from wound center  [id number]  Healed wound  Subjective:      Patient ID: Gurdeep Whittaker is a 76 y o  male  HPI  Patient was referred by oncology because of open wound on his abdomen  The patient has a history of bladder cancer with metastases to the lungs  He is getting infusions and develops dermatitis of his arms  He believes that the abdominal wound is different but it did occur after infusion  He used triple antibiotic ointment and he states that the wounds are now closed  Objective:      /90   Pulse 84   Temp (!) 95 6 °F (35 3 °C)   Resp 16          Physical Exam  Vitals and nursing note reviewed  Constitutional:       Appearance: Normal appearance  He is well-developed and underweight  HENT:      Head: Normocephalic and atraumatic  Cardiovascular:      Rate and Rhythm: Normal rate  Pulmonary:      Effort: Pulmonary effort is normal    Abdominal:      General: Abdomen is flat  Comments: Hypopigmented skin post wound healing  No open areas  There is a pleural drain on the left chest wall and nephrostomy tubes   Skin:     General: Skin is warm and dry  Findings: No erythema or wound  Neurological:      Mental Status: He is alert and oriented to person, place, and time  Psychiatric:         Attention and Perception: Attention normal          Mood and Affect: Mood and affect normal          Behavior: Behavior is cooperative           Cognition and Memory: Cognition normal

## 2022-11-11 NOTE — TELEPHONE ENCOUNTER
Call placed to patient and spoke with him  Informed him of the recommendations of Dr Sherine Mehta at this time  Pt is aware and will take medication as directed         Mishel Love MD  You 2 hours ago (7:44 AM)         Culture did not show specific bug - but will send med to pharmacy    Message text

## 2022-11-11 NOTE — TELEPHONE ENCOUNTER
Patient of Dr Santana Richard at [facility]    Patient called stating he had uti and he took medication for 7 days a week ago  He still has the same symptoms  He stated now he is having a lot of blood in his urine and would like to know how to proceed      Patient can be reached at [phone] (

## 2022-11-11 NOTE — TELEPHONE ENCOUNTER
Called and spoke with patient, states still has blood in his urine  Pt has not started the medication will get it today  Pt reports blood in urine since last week  States has two drains and once always with blood, reports that it varies from bright red to dark red  Reports his hydration is ok  Did advise to start medication, continue hydration, ED precautions reviewed

## 2022-11-14 ENCOUNTER — PREP FOR PROCEDURE (OUTPATIENT)
Dept: INTERVENTIONAL RADIOLOGY/VASCULAR | Facility: CLINIC | Age: 77
End: 2022-11-14

## 2022-11-14 DIAGNOSIS — N13.30 HYDRONEPHROSIS, UNSPECIFIED HYDRONEPHROSIS TYPE: Primary | ICD-10-CM

## 2022-11-15 ENCOUNTER — RADIATION ONCOLOGY FOLLOW-UP (OUTPATIENT)
Dept: RADIATION ONCOLOGY | Facility: HOSPITAL | Age: 77
End: 2022-11-15
Attending: INTERNAL MEDICINE

## 2022-11-15 ENCOUNTER — TELEPHONE (OUTPATIENT)
Dept: UROLOGY | Facility: MEDICAL CENTER | Age: 77
End: 2022-11-15

## 2022-11-15 ENCOUNTER — HOSPITAL ENCOUNTER (OUTPATIENT)
Dept: RADIOLOGY | Facility: HOSPITAL | Age: 77
Discharge: HOME | End: 2022-11-15
Attending: RADIOLOGY

## 2022-11-15 VITALS
HEART RATE: 67 BPM | BODY MASS INDEX: 27.09 KG/M2 | WEIGHT: 211 LBS | OXYGEN SATURATION: 98 % | DIASTOLIC BLOOD PRESSURE: 88 MMHG | RESPIRATION RATE: 16 BRPM | TEMPERATURE: 97.2 F | SYSTOLIC BLOOD PRESSURE: 158 MMHG

## 2022-11-15 DIAGNOSIS — N13.30 HYDRONEPHROSIS, UNSPECIFIED HYDRONEPHROSIS TYPE: ICD-10-CM

## 2022-11-15 DIAGNOSIS — N40.1 BENIGN PROSTATIC HYPERPLASIA WITH WEAK URINARY STREAM: Primary | ICD-10-CM

## 2022-11-15 DIAGNOSIS — C67.9 BLADDER CARCINOMA (HCC): Primary | ICD-10-CM

## 2022-11-15 DIAGNOSIS — R39.12 BENIGN PROSTATIC HYPERPLASIA WITH WEAK URINARY STREAM: Primary | ICD-10-CM

## 2022-11-15 LAB — METHYLMALONATE SERPL-SCNC: 706 NMOL/L (ref 0–378)

## 2022-11-15 PROCEDURE — 0T25X0Z CHANGE DRAINAGE DEVICE IN KIDNEY, EXTERNAL APPROACH: ICD-10-PCS | Performed by: RADIOLOGY

## 2022-11-15 RX ADMIN — IOHEXOL 15 ML: 350 INJECTION, SOLUTION INTRAVENOUS at 12:55

## 2022-11-15 NOTE — TELEPHONE ENCOUNTER
I spoke with patients granddaughter per medical communication consent  She wants to know why this patient was put back on Bactrim for the UC just done 11/7/22 when there was no certain bacteria detected  He was on Bactrim in the past with the UC from 10/14/22 and did fine but is still having issues this time and is on Bactrim  Also she was told he should be taking one tablet due to his renal function but this prescription called in this last time was for twice a day  She wants some clarification with this  Also he is still having issues with bleeding as well  I did try explaining to her since this UC was contaminated Dr Morgan looked at previous UC and that is why he was put back on Bactrim  She was not happy with this explanation  I did advise her I would pass this onto the provider to get clarification  I also mentioned about another UC being done if he is not doing well on this antibiotic with the symptoms

## 2022-11-15 NOTE — TELEPHONE ENCOUNTER
Pt granddaughter called in requesting a call back from Dr Vasile Flanagan in regards to pt urine sample and medication that was prescribed  Pt granddaughter also stated that pt is still bleeding as well  Please call to follow up

## 2022-11-15 NOTE — TELEPHONE ENCOUNTER
Pt's granddaughter Spencer Houston called in stating she still hasn't received a call back  She is requesting a call back at [phone]

## 2022-11-15 NOTE — PROGRESS NOTES
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  He is not a surgical candidate  The pt completed a course of radiation on 03/24/22  Last visit in radiation oncology was an EOT telemedicine visit on 5/2/22 8/30/22  Bone Scan  IMPRESSION:   1  No scintigraphic evidence of osseous metastasis  2   No focal tracer activity involving the ribs to suggest acute fracture  3   The etiology of the patient's clinical symptomatology is not identified on bone scan  9/2/22  [place]  Plan is to D/C Keytruda and switch to Enfortumab every 28 days  Restaging CT CAP in 3 months    10/4/22  CT chest abdomen pelvis  IMPRESSION:   1  Improved left para-aortic lymphadenopathy compared to 8/26/2022, compatible with partial response to therapy  2   Stable eccentrically thickened bladder wall anterolaterally, which is underdistended, likely known bladder neoplasm    3  Increase in size of a subsolid pulmonary nodule in the inferior lingular segment, unclear if infectious/inflammatory or progression of metastatic disease  Recommend short-term interval follow-up CT in 3 months for interval change  4  Improved trace left pleural effusion with indwelling pleural catheter  5  Findings suggestive of proctitis  10/17/22  Urology   Bactrim prescribed for UTI    10/26/22  Urology [facility]) Telemed visit  Mild LUTS symptoms  Encouraged to schedule endoscopic assessment  11/15/22 B/L nephrostomy tube check and changed in IR  Appt      11 17 22  Infusion -See list for full schedule    11 18 22  Med Onc   11 22 22  Cardio Thoracic  12 12 22  Palliative   12 16 22 Med Onc         Follow up visit     Oncology History   Bladder carcinoma ([facility] [address] )   8/16/2016 Initial Diagnosis    Bladder carcinoma (HCC)     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)    Initial Diagnosis    Malignant neoplasm of anterior wall of urinary bladder ([facility] [address] )     1994 Surgery    TURBT      1994 - 1995 Chemotherapy    Induction intravesical BCG x 2      8/17/2016 Surgery    TURBT      12/13/2016 Surgery    TURBT  [facility])    Bladder, left posterior wall, biopsy: High-grade urothelial carcinoma in-situ  Muscularis propria is identified with no tumor seen  Bladder, trigone, biopsy: Non-invasive high-grade papillary urothelial carcinoma, and flat urothelial carcinoma in-situ  Muscularis propria is not identified  1/4/2017 - 2/8/2017 Chemotherapy    Induction intravesical BCG     6/19/2017 Surgery    TURBT  [facility])    - Posterior wall, biopsy: Mild chronic cystitis with focal urothelial atypia  Muscularis propria is identified      - Right lateral wall, biopsy: Granulomatous cystitis  Muscularis propria is identified  - Left lateral wall, biopsy: Non invasive high-grade urothelial carcinoma  Muscularis propria is not identified  4/26/2019 Surgery    TURBT   [facility])     - bladder, right posterior wall, biopsy: Urothelial carcinoma in situ   - bladder, right lateral wall, biopsy: Small focus of urothelial carcinoma in situ  - bladder, left posterior wall, biopsy: Urothelial carcinoma in situ   - bladder, left lateral wall, biopsy: Urothelial carcinoma in situ     - bladder, trigone, biopsy: Invasive high-grade urothelial carcinoma, invading into lamina propria  No lymphovascular invasion seen  Muscularis propria not present     Separate piece showing urothelial carcinoma in situ        7/2019 -  Chemotherapy    Induction intravesical BCG x 4      11/4/2019 Surgery    TURBT  [facility])    - Superficial bladder tumor, transurethral resection: Non-invasive high grade urothelial carcinoma, with urothelial carcinoma in situ  Muscularis propria is not identified      - Bladder tumor, transurethral resection: Non-invasive high grade urothelial carcinoma, with urothelial carcinoma in situ, see note  Muscularis propria is present and free of tumor  12/9/2019 - 1/28/2020 Chemotherapy    Induction intravesical BCG+INF        6/9/2020 - 6/23/2020 Chemotherapy    Maintenance intravesical BCG+INF        11/19/2020 Biopsy    TURBT ([facility], Dr Crystal Hernandez)    A  Urinary Bladder, Left Posterior Bladder Wall:  -Extensively- denuded urothelial lined mucosa with mild chronic inflammation, vascular congestion  And edema   -Unremarkable small fragment of detrusor muscle present  -No evidence of invasive urothelial carcinoma seen     B  Urinary Bladder, Left lateral bladder Wall:  -Partially-denuded urothelial lined mucosa with mild  chronic inflammation  -Unremarkable small fragment of detrusor muscle present  -No evidence of invasive urothelial carcinoma seen     C  Urinary Bladder, Right Posterior Bladder wall:  -Urothelial lined mucosa with mild  chronic inflammation Von Brunn nests and mild urothelial atypia, possibly due to previous BCG administration   -Unremarkable small fragment of detrusor muscle present  -No evidence of invasive urothelial carcinoma seen     D  Urinary Bladder, Right Lateral Bladder Wall:  - Urothelial lined mucosa with mild  chronic inflammation   -Muscularis propria/ detrusor muscle is not present for evaluation    -No evidence of invasive urothelial carcinoma seen  E  Prostate, Prostate Tissue:  -Urothelial lined mucosa with mild chronic inflammation, prominent Von Brunn nests and Cystitis cystica   -Unremarkable small fragment of detrusor muscle present   -No evidence of malignancy seen               10/18/2021 -  Cancer Staged    Staging form: Urinary Bladder, AJCC 8th Edition  - Clinical stage from 10/18/2021: Stage II (cT2, cN0, cM0) - Signed by Riesa Angelucci, MD on 11/17/2021  Stage prefix: Initial diagnosis       10/18/2021 Surgery    TURBT ([facility])    A  Left posterior wall, bladder (transurethral resection):     - Urothelial tissue with small atypical cauterized focus favored to represent superficially invasive high-grade urothelial carcinoma  - Muscularis propria (detrusor muscle) present, and negative for carcinoma  - Marked edema and mucosal denudation with thermal artifact noted  B  Anterior wall, bladder (transurethral resection):      - Invasive high-grade urothelial carcinoma  - Carcinoma invades muscularis propria (detrusor muscle)  C   Left lateral wall, bladder (transurethral resection):     - Urothelial tissue with small atypical focus with marked thermal artifact; cannot exclude superficial carcinoma  - Muscularis propria (detrusor muscle) present, and negative for carcinoma        - Marked edema and mucosal denudation with thermal artifact noted     1/24/2022 - 3/26/2022 Chemotherapy    mitoMYcin (MUTAMYCIN), 12 mg/m2 = 28 7 mg (100 % of original dose 12 mg/m2), Intravenous, Once, 1 of 1 cycle  Dose modification: 12 mg/m2 (original dose 12 mg/m2, Cycle 1), 3 mg/m2 (original dose 12 mg/m2, Cycle 1)  Administration: 7 2 mg (1/24/2022)  fluorouracil (ADRUCIL) ambulatory infusion Soln, 500 mg/m2/day = 4,780 mg (50 % of original dose 1,000 mg/m2/day), Intravenous, Over 96 hours, 1 of 1 cycle, Start date: 1/18/2022, End date: 1/23/2022  Dose modification: 500 mg/m2/day (original dose 1,000 mg/m2/day, Cycle 1, Reason: Dose modified as per discussion with consulting physician)     1/24/2022 - 3/24/2022 Radiation    Pelvis:1 6X 21 / 21 275 0 [phone] 59      Treatment dates:  C1: 1/24/2022 - 3/24/2022     7/15/2022 - 8/26/2022 Chemotherapy    pembrolizumab (KEYTRUDA) IVPB, 400 mg, Intravenous, Once, 2 of 6 cycles  Administration: 400 mg (7/15/2022), 400 mg (8/26/2022)     9/16/2022 -  Chemotherapy    enfortumab vedotin-ejfv (PADCEV) IVPB, 125 mg (original dose 1 25 mg/kg), Intravenous, Once, 3 of 6 cycles  Dose modification: 125 mg (original dose 1 25 mg/kg, Cycle 1, Reason: Dose modified as per discussion with consulting physician)  Administration: 120 mg (9/16/2022), 120 mg (9/23/2022), 120 mg (9/30/2022), 120 mg (10/14/2022), 120 mg (10/21/2022), 120 mg (10/28/2022), 120 mg (11/11/2022)         Review of Systems:  Review of Systems   Constitutional: Positive for appetite change (decreased ) and fatigue  Gastric bypass hx 2011   HENT: Positive for hearing loss (Has hearing aids/does not wear them on most occasions  Hearing loss  related  )  Eyes:        Early stages of glaucoma  Reading glasses  Taking eye drops   Respiratory: Positive for shortness of breath (Angio-seven [id number])  Left chest pleuravac tube    Cardiovascular: Positive for chest pain (Occasional "chest discomfort"/Cardio follow  loop placed last week )  Negative for palpitations and leg swelling  Gastrointestinal: Positive for constipation (d/t iron )  Negative for blood in stool (Has noted blood in the past  "Not recent" ) and diarrhea (Diet related at times  )  Endocrine: Positive for cold intolerance  Genitourinary: Positive for decreased urine volume, flank pain (b/l d/t nephrostomy tubes ), frequency, hematuria (left nephrostomy bag ) and penile pain  Negative for dysuria  Frequency/Urgency/Dysuria  Hematuria with passing urine and in rt nephrostomy tube  Nocturia q  1 5-2 hrs/uses urinal at night   Unable to "make it to the bathroom at times"  Flomax has proven no urinary changes      Musculoskeletal: Positive for neck pain  Amputation left small toe/uses cane to ambulate/offers stability   Skin: Positive for color change (change d/t ?? chemo  skin color change )  Negative for rash     Allergic/Immunologic: Positive for environmental allergies  Negative for food allergies  Neurological: Positive for dizziness and weakness  Hematological: Bruises/bleeds easily  Blood I rt nephrostomy tube bright red  Psychiatric/Behavioral: Positive for sleep disturbance  Negative for suicidal ideas  Screen for moderate depression  Denies need for counseling, seeing counselor [facility]  Clinical Trial: no    IPSS Questionnaire (AUA-7): Over the past month…    1)  How often have you had a sensation of not emptying your bladder completely after you finish urinating? 1 - Less than 1 time in 5   2)  How often have you had to urinate again less than two hours after you finished urinating? 1 - Less than 1 time in 5   3)  How often have you found you stopped and started again several times when you urinated? 1 - Less than 1 time in 5   4) How difficult have you found it to postpone urination? 1 - Less than 1 time in 5   5) How often have you had a weak urinary stream?  1 - Less than 1 time in 5   6) How often have you had to push or strain to begin urination? 1 - Less than 1 time in 5   7) How many times did you most typically get up to urinate from the time you went to bed until the time you got up in the morning?   3 - 3 times   Total Score:  9       Teaching: completed  Covid Vaccine Status: vaccinated x 3     Health Maintenance   Topic Date Due   • COVID-19 Vaccine (4 - Booster for Pfizer series) 11/28/2021   • URINE MICROALBUMIN  07/28/2022   • Falls: Plan of Care  09/29/2022   • BMI: Followup Plan  05/04/2023   • HEMOGLOBIN A1C  04/20/2023   • Fall Risk  05/04/2023   • Medicare Annual Wellness Visit (AWV)  05/04/2023   • Depression Remission PHQ  05/04/2023   • DM Eye Exam  05/25/2023   • Diabetic Foot Exam  06/09/2023   • BMI: Adult  10/31/2023   • Hepatitis C Screening  Completed   • Pneumococcal Vaccine: 65+ Years  Completed   • Influenza Vaccine  Completed   • HIB Vaccine  Aged Out   • Hepatitis B Vaccine  Aged Out   • IPV Vaccine  Aged Out   • Hepatitis A Vaccine  Aged Out   • Meningococcal ACWY Vaccine  Aged Out   • HPV Vaccine  Aged Out     Patient Active Problem List   Diagnosis   • Coronary artery disease of native artery of native heart with stable angina pectoris ([facility] [address] )   • Bl[facility]   • Hypertension with renal disease   • Hyperlipidemia   • Presence of stent in coronary artery   • Type 2 diabetes mellitus, with long-term current use of insulin ([facility])   • Primary osteoarthritis of left knee   • Cystitis due to intravesical BCG administration   • Degeneration of lumbar intervertebral disc   • Back pain   • Arteriosclerosis of artery of extremity (HCC)   • Stable angina pectoris (HCC)   • Herpes zoster without complication   • Localized edema   • Superficial phlebitis   • Preop examination   • Acute renal failure (ARF) (HCC)   • Secondary renal hyperparathyroidism (HCC)   • 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)   • Abnormal MRI, lumbar spine   • Diabetic polyneuropathy associated with type 2 diabetes mellitus ([facility] [address] )   • Dysuria   • Diabetic ulcer of left foot associated with type 2 diabetes mellitus, 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 ([facility] )   • Acute kidney injury superimposed on CKD ([facility])   • Chronic anemia   • Anemia of chronic disease   • Preoperative clearance   • PAD (peripheral artery disease) ([facility])   • Left carotid bruit   • Gross hematuria   • Chronic bronchitis (HCC)   • Moderate major depression, single episode (HCC)   • Thrombocytopenia (HCC)   • 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   • Lumbar spondylosis   • Chronic pain syndrome   • Acute urinary retention   • Hematuria   • Continuous opioid dependence ([facility] )   • Port-A-Cath in place   • 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[facility])   • Embolism and 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 ([facility])   • Abnormal CT scan, bladder   • Retained ureteral stent   • Fall   • Hyperkalemia   • CKD (chronic kidney disease) stage 4, GFR 15-29 ml/min [facility])   • Bilateral hydronephrosis   • Cancer related pain   • Syncope and collapse   • Depression with suicidal ideation   • Pulmonary nodules   • Retroperitoneal lymphadenopathy   • Recurrent pleural effusion   • Shortness of breath   • History of tobacco use disorder     Past Medical History:   Diagnosis Date   • Anemia     Last assessed: 9/28/17   • Anxiety    • Arteriosclerotic cardiovascular disease     Last assessed: 9/28/17   • Arthritis    • Bladder cancer ([facility] [address] )     bladder- had BCG treatments   • Chronic kidney disease     Stage IV   • CKD (chronic kidney disease) stage 4, GFR 15-29 ml/min ([facility])    • Colon polyp    • Coronary artery disease     7 stents   • Depression    • Diabetes mellitus ([facility])     IDDM   • GERD (gastroesophageal reflux disease)    • Glaucoma    • Hematuria    • History of fusion of cervical spine    • Hyperlipidemia    • Hypertension    • Insomnia     Last assessed: 11/14/12   • Loss of hearing     has hearing aids but usually does not wear them   • Other seasonal allergic rhinitis     Last assessed: 2/10/16   • PAD (peripheral artery disease) ([facility])    • Shortness of breath     on exertion   • Spinal stenosis of lumbar region    • Transient cerebral ischemia     No Residual   • Uses walker     w/c for longer distances     Past Surgical History:   Procedure Laterality Date   • CARDIAC SURGERY      Cath stent placement  Last assessed: 3/9/17  Interventional Catheterization   • CHOLECYSTECTOMY     • COLONOSCOPY     • CYSTOSCOPY      Diagnostic w/biopsy  Glee Form  Last assessed: 12/1/14   • CYSTOSCOPY N/A 4/12/2022    Procedure: CYSTOSCOPY  Bladder biopsies  ;  Surgeon: Fabiano Jenkins MD;  [facility];  Service: Urology   • CYSTOSCOPY W/ RETROGRADES Right 3/1/2022    Procedure: CYSTO; stent removal retrograde;  Surgeon: Fabiano Jenkins MD;  [facility];  Service: Urology   • CYSTOSCOPY W/ URETERAL STENT PLACEMENT Bilateral 10/18/2021    Procedure: bilateral retrogrades, cytology collection;  Surgeon: Silvia Briceño MD;  [facility];  Service: Urology   • CYSTOURETHROSCOPY      w/cautery  Malika Riis   • FL RETROGRADE PYELOGRAM  10/18/2021   • FL RETROGRADE PYELOGRAM  10/24/2021   • GASTRIC BYPASS      For morbid obesity w/Shaji-en-Y   Resolved: 11/17/09   • INCISION AND DRAINAGE OF WOUND Right 2/26/2017    Procedure: INCISION AND DRAINAGE (I&D) EXTREMITY WITH APPLICATION OF GRAFT JACKET;  Surgeon: Hannah Montelongo DPM;  [facility];  Service:    • INCISION AND DRAINAGE OF WOUND Right 4/25/2017    Procedure: INCISION AND DRAINAGE (I&D) EXTREMITY, APPLICATION OF GRAFT;  Surgeon: Hannah Montelongo DPM;  [facility];  Service:    • IR BIOPSY OTHER  7/2/2020   • IR LOWER EXTREMITY ANGIOGRAM  2/8/2021   • IR LOWER EXTREMITY ANGIOGRAM  2/11/2021   • IR NEPHROSTOMY TUBE CHECK/CHANGE/REPOSITION/REINSERTION/UPSIZE  4/28/2022   • IR NEPHROSTOMY TUBE CHECK/CHANGE/REPOSITION/REINSERTION/UPSIZE  5/24/2022   • IR NEPHROSTOMY TUBE CHECK/CHANGE/REPOSITION/REINSERTION/UPSIZE  6/7/2022   • IR NEPHROSTOMY TUBE CHECK/CHANGE/REPOSITION/REINSERTION/UPSIZE  7/28/2022   • IR NEPHROSTOMY TUBE PLACEMENT  2/25/2022   • IR PORT PLACEMENT  1/17/2022   • IR THORACENTESIS  9/2/2022   • IR THORACENTESIS  9/14/2022   • IR TUNNELED CENTRAL LINE PLACEMENT  12/24/2020   • JOINT REPLACEMENT      christofer knees replaced   • CT AMPUTATION METATARSAL+TOE,SINGLE Left 12/21/2020    Procedure: RAY RESECTION FOOT;  Surgeon: Jaspreet Cortez DPM;  [facility];  Service: Podiatry   • CT AMPUTATION METATARSAL+TOE,SINGLE Left 12/31/2020    Procedure: 5TH MET RESECTION;  Surgeon: Jaspreet Cortez DPM;  [facility];  Service: Podiatry   • CT CYSTOURETHROSCOPY W/IRRIG & EVAC CLOTS N/A 2/10/2021    Procedure: CYSTOSCOPY EVACUATION OF CLOTS, fulguration;  Surgeon: Madeline Noland MD;  [facility];  Service: Urology   • CT CYSTOURETHROSCOPY W/IRRIG & EVAC CLOTS N/A 10/24/2021    Procedure: CYSTOSCOPY EVACUATION OF CLOT, fulguration of bleeding vessels, right ureter stent placement, retrograde pyelogram;  Surgeon: Kofi Mendez MD;  [facility];  Service: Urology   • ID CYSTOURETHROSCOPY,BIOPSY N/A 8/16/2016    Procedure: CYSTOSCOPY WITH BIOPSIES;  Surgeon: Rashard Lebron MD;  [facility];  Service: Urology   • ID CYSTOURETHROSCOPY,FULGUR <0 5 CM LESN N/A 11/19/2020    Procedure: CYSTO W/BIOPSIES, transurethral prostate bx;  Surgeon: Bill Martinez MD;  [facility];  Service: Urology   • ID CYSTOURETHROSCOPY,FULGUR >5 CM LESN Bilateral 10/18/2021    Procedure: TRANSURETHRAL RESECTION OF BLADDER TUMOR (TURBT);   Surgeon: Sumit Mccann MD;  [facility];  Service: Urology   • ID [id number] 3RD+ ORD SLCTV ABDL PEL/LXTR [facility] Left 2/8/2021    Procedure: LEG angiogram, CO2 w/limited contrast with balloon angioplasty postertior tibial artery;  Surgeon: Lacy Silverio MD;  [facility];  Service: Vascular   • ROTATOR CUFF REPAIR     • SMALL INTESTINE SURGERY      Surgery Shaji-en-Y   • SPINAL FUSION      lumbar and cervical fusions   • VAC DRESSING APPLICATION Right 4/37/6411    Procedure: APPLICATION VAC DRESSING;  Surgeon: Skip Hernandez DPM;  [facility];  Service:    • WOUND DEBRIDEMENT Left 2/16/2021    Procedure: FOOT DEBRIDE, [address] OUT w/graft application;  Surgeon: Skip Hernandez DPM;  [facility];  Service: Podiatry     Family History   Problem Relation Age of Onset   • Diabetes Mother    • Heart disease Mother    • Other Mother         High blood pressure   • Heart disease Father    • Diabetes Sister    • Other Sister         High blood pressure   • Kidney disease Sister    • Heart disease Brother    • Other Brother         High blood pressure     Social History     Socioeconomic History   • Marital status: /Civil Union     Spouse name: Not on file   • Number of children: Not on file   • Years of education: Not on file   • Highest education level: Not on file   Occupational History   • Not on file   Tobacco Use   • Smoking status: Former Smoker     Packs/day: 3 00     Years: 27 00     Pack years: 81 00     Types: Cigarettes     Quit date: 5     Years since quittin 9   • Smokeless tobacco: Never Used   Vaping Use   • Vaping Use: Never used   Substance and Sexual Activity   • Alcohol use: Never     Comment: beer / liquor   • Drug use: Not Currently     Types: Marijuana     Comment: quit 2019 had medical marijuana   • Sexual activity: Not Currently   Other Topics Concern   • Not on file   Social History Narrative    Consumes 1 cup of coffee and 1 soda per day     Social Determinants of Health     Financial Resource Strain: Not on file   Food Insecurity: Not on file   Transportation Needs: Not on file   Physical Activity: Not on file   Stress: Not on file   Social Connections: Not on file   Intimate Partner Violence: Not on file   Housing Stability: Not on file       Current Outpatient Medications:   •  Accu-Chek Softclix Lancets lancets, Test blood sugar 4 times daily, Disp: 200 each, Rfl: 0  •  acetaminophen (TYLENOL) 325 mg tablet, Take 650 mg by mouth every 6 (six) hours as needed for mild pain  , Disp: , Rfl:   •  ALPRAZolam (XANAX) 0 25 mg tablet, Take 2 tablets (0 5 mg total) by mouth daily at bedtime as needed for anxiety, Disp: 30 tablet, Rfl: 0  •  ARIPiprazole (ABILIFY) 2 mg tablet, Take 1 tablet (2 mg total) by mouth daily, Disp: 30 tablet, Rfl: 0  •  aspirin (ECOTRIN LOW STRENGTH) 81 mg EC tablet, Take 1 tablet (81 mg total) by mouth daily, Disp: , Rfl:   •  Azelastine HCl 0 15 % SOLN, Inhale 1 spray 2 (two) times a day, Disp: 30 mL, Rfl: 3  •  Bimatoprost (LUMIGAN OP), Apply 1 drop to eye daily at bedtime , Disp: , Rfl:   •  calcitriol (ROCALTROL) 0 25 mcg capsule, Take 1 capsule (0 25 mcg total) by mouth daily (Patient not taking: Reported on 10/31/2022), Disp: 90 capsule, Rfl: 0  •  cetirizine (ZyrTEC) 10 MG chewable tablet, Chew 10 mg daily, Disp: , Rfl:   •  DULoxetine (CYMBALTA) 30 mg delayed release capsule, TAKE ONE CAPSULE BY MOUTH EVERY DAY, Disp: 90 capsule, Rfl: 0  •  ezetimibe (ZETIA) 10 mg tablet, Take 1 tablet (10 mg total) by mouth daily, Disp: 90 tablet, Rfl: 1  •  Ferrous Sulfate Dried (Feosol) 200 (65 Fe) MG TABS, Take 65 mg by mouth daily (Patient not taking: Reported on 10/31/2022), Disp: 90 tablet, Rfl: 0  •  fluocinonide (LIDEX) 0 05 % cream, Apply topically 2 (two) times a day (Patient taking differently: Apply topically as needed), Disp: 30 g, Rfl: 0  •  fluticasone (FLONASE) 50 mcg/act nasal spray, 1 spray into each nostril daily (Patient taking differently: 1 spray into each nostril as needed), Disp: 11 mL, Rfl: 1  •  furosemide (LASIX) 20 mg tablet, Take 1 tablet (20 mg total) by mouth daily, Disp: 90 tablet, Rfl: 1  •  gabapentin (NEURONTIN) 300 mg capsule, TAKE ONE CAPSULE BY MOUTH EVERY DAY AT BEDTIME, Disp: 90 capsule, Rfl: 0  •  glucose blood (Accu-Chek Martha Plus) test strip, Test blood sugar 4 times daily, Disp: 200 strip, Rfl: 0  •  hydrocortisone 2 5 % cream, Apply topically 2 (two) times a day as needed for irritation or rash, Disp: 30 g, Rfl: 0  •  Insulin Pen Needle 31G X 8 MM MISC, Inject 3 times a day, Disp: 100 each, Rfl: 2  •  isosorbide mononitrate (IMDUR) 30 mg 24 hr tablet, TAKE ONE TABLET BY MOUTH EVERY DAY IN THE MORNING, Disp: 90 tablet, Rfl: 0  •  Lantus SoloStar 100 units/mL injection pen, 18 units qhs (Patient taking differently: Inject 18 Units under the skin daily at bedtime), Disp: 30 mL, Rfl: 1  •  lidocaine (XYLOCAINE) 2 % topical gel, Apply topically as needed for mild pain, Disp: 30 mL, Rfl: 0  •  lidocaine-prilocaine (EMLA) cream, Apply topically as needed for mild pain, Disp: 30 g, Rfl: 0  •  loperamide (IMODIUM) 2 mg capsule, Take 2 mg by mouth 4 (four) times a day as needed for diarrhea, Disp: , Rfl:   •  metoprolol succinate (TOPROL-XL) 100 mg 24 hr tablet, TAKE ONE TABLET BY MOUTH EVERY DAY, Disp: 90 tablet, Rfl: 0  •  multivitamin (THERAGRAN) TABS, Take 1 tablet by mouth daily  , Disp: , Rfl:   •  naloxone (NARCAN) 4 mg/0 1 mL nasal spray, Administer 1 spray into a nostril  If no response after 2-3 minutes, give another dose in the other nostril using a new spray   (Patient not taking: Reported on 10/31/2022), Disp: 1 each, Rfl: 1  •  nitrofurantoin (MACROBID) 100 mg capsule, Take 1 capsule (100 mg total) by mouth 2 (two) times a day (Patient not taking: Reported on 10/31/2022), Disp: 10 capsule, Rfl: 0  •  NovoLOG FlexPen 100 units/mL injection pen, 10 units with each meal  (Patient taking differently: Inject 10 Units under the skin 3 (three) times a day with meals), Disp: 5 pen, Rfl: 1  •  omeprazole (PriLOSEC) 20 mg delayed release capsule, Take 1 capsule (20 mg total) by mouth daily in the early morning PRN, Disp: 90 capsule, Rfl: 0  •  ondansetron (Zofran ODT) 8 mg disintegrating tablet, Take 1 tablet (8 mg total) by mouth every 8 (eight) hours as needed for nausea or vomiting, Disp: 20 tablet, Rfl: 0  •  oxybutynin (DITROPAN-XL) 10 MG 24 hr tablet, TAKE ONE TABLET BY MOUTH EVERY DAY, Disp: 30 tablet, Rfl: 0  •  oxyCODONE (OxyCONTIN) 10 mg 12 hr tablet, Take 1 tablet (10 mg total) by mouth every 8 (eight) hours Max Daily Amount: 30 mg, Disp: 90 tablet, Rfl: 0  •  oxyCODONE (Roxicodone) 5 immediate release tablet, Take 1 tablet (5 mg total) by mouth every 4 (four) hours as needed for moderate pain Max Daily Amount: 30 mg, Disp: 60 tablet, Rfl: 0  •  Patiromer Sorbitex Calcium 8 4 g PACK, Take 8 4 packets by mouth, Disp: , Rfl:   •  phenazopyridine (PYRIDIUM) 200 mg tablet, Take 1 tablet (200 mg total) by mouth 3 (three) times a day with meals, Disp: 10 tablet, Rfl: 0  •  polyethylene glycol (MIRALAX) 17 g packet, Take 17 g by mouth daily, Disp: 30 each, Rfl: 0  •  predniSONE 20 mg tablet, Take 1 tablet (20 mg total) by mouth daily (Patient not taking: Reported on 10/31/2022), Disp: 5 tablet, Rfl: 0  •  senna (SENOKOT) 8 6 MG tablet, Take 2 tablets (17 2 mg total) by mouth 2 (two) times a day, Disp: 90 tablet, Rfl: 0  •  sodium chloride, PF, 0 9 %, 10 mL by Intracatheter route daily Intracatheter flushing daily, Disp: 900 mL, Rfl: 0  •  sodium chloride, PF, 0 9 %, 10 mL by Intracatheter route daily Intracatheter flushing daily, Disp: 900 mL, Rfl: 0  •  sodium chloride, PF, 0 9 %, 10 mL by Intracatheter route daily Bilateral PCNs to be flushed daily with prefilled 10cc NS, Disp: 600 mL, Rfl: 3  •  sulfamethoxazole-trimethoprim (BACTRIM DS) 800-160 mg per tablet, Take 1 tablet by mouth every 12 (twelve) hours for 7 days, Disp: 14 tablet, Rfl: 0  No current facility-administered medications for this visit  Allergies   Allergen Reactions   • Atorvastatin Hives, Itching and Rash   • Simvastatin Rash and Edema     Edema of lower legs   • Statins Hives and Itching   • Insulin Lispro Swelling and Edema     " Lower Legs"   • Other Itching, Rash and Other (See Comments)     "EKG Patches"   "blue EKG patches"     There were no vitals filed for this visit

## 2022-11-15 NOTE — DISCHARGE INSTRUCTIONS
Nephrostomy Tube Care     WHAT YOU NEED TO KNOW:   A nephrostomy tube is a catheter (thin plastic tube) that is inserted through your skin and into your kidney  The nephrostomy tube drains urine from your kidney into a collecting bag outside your body  You may need a nephrostomy tube when something is blocking the normal flow of urine  A nephrostomy tube may be used for a short or a long period of time  The nephrostomy tube comes out of your back, so you will need someone to help care for your nephrostomy tube  DISCHARGE INSTRUCTIONS:      How to clean the skin around the nephrostomy tube and change the bandage:  Since the nephrostomy tube comes out of your back, you will not be able to care for it by yourself  Ask someone to follow the general directions below to check and care for your nephrostomy tube  Gather the items you will need  Disposable (single use) under-pad, and a clean washcloth  Plain soap, warm water, and new medical gloves  Sterile gauze bandages  Clear adhesive dressing or medical tape  Skin barrier  Protective skin film  Trash bag  Remove the old bandage, and check the tube entry site  Have the patient lie on his side with the nephrostomy tube entry site facing up  Place the under-pad where it will catch drainage as you are working with the nephrostomy tube  Wash your hands with soap and water  Put on new medical gloves  Gently remove the old bandage, without pulling on the tube  Do this by holding the skin beside the tube with one hand  With the other hand, gently remove sticky tape and the skin barrier by pulling in the same direction as hair growth  Do not touch the side of the bandage that is placed over or around the tube  Throw the bandage and skin barrier away in a trash bag  Look for signs of infection, such as skin redness and swelling  Report any skin changes to healthcare providers  Clean the tube entry site      Hold the tube in place to keep it from being pulled out while you are cleaning around it  You will need to clean the area twice  For the first cleaning, wet a new gauze bandage with soap and water  Begin at the entry site of the tube  Wipe the skin in circles, moving away from the entry site  Remove blood and any other material with the gauze  Do this as often as needed  Use a new gauze bandage each time you clean the area, moving away from the entry site  For the second cleaning, wet a new gauze bandage with water  Begin at the entry site of the tube  Wipe the skin in circles, moving away from the entry site  Use a new gauze bandage each time you clean the area, moving away from the entry site  Gently pat the skin with a clean washcloth to dry it  Apply the skin barrier and bandages  Roll up a bandage to make it thick, and place it under  the place where the tube enters the skin  Place it to support the tube, and stop it from kinking or bending  Tape the bandage in place, and apply more bandages if directed by a healthcare provider  Bring the tubing forward to the front and tape it to the skin  Do not stretch the tube tight, because this may pull the nephrostomy tube out  How often to change the bandage  Change the bandage around the tube, every other day  If your bandages  get dirty or wet, change them right away, and as often as needed  If your nephrostomy tube is to be used for a long period of time, the tube needs to be changed every 2 to 3 months  Healthcare providers will tell you when you need to make an appointment to have your tube changed  How to care for the urine drainage bag:   Ask if you need to measure and write down how much urine is in the bag before you empty it  Drain urine out of the drainage bag when it is ½ to ? full  Open the spout at the bottom of the bag to empty the urine into the toilet  You may need to detach the drainage bag from the nephrostomy tube to change it    If so, attach a new drainage bag tightly to the nephrostomy tube  How to prevent problems with your nephrostomy tube:   Change bandages, directed  This helps to prevent infection  Throw away or clean your drainage bag as directed by your healthcare provider  Wipe the connecting ends of the drainage bag with alcohol before you reconnect the bag to the tube  This helps prevent infection  Keep the tube taped to your skin and connected to a drainage bag placed below the level of your kidneys  This helps prevent urine from backing up into your kidneys  You may wear a small drainage bag strapped to your leg to let you move around more easily  Check the catheter to be sure it is in place after you change your clothes or do other activities  Do not wear tight clothing over the tube  Place the tubing over your thigh rather than under it when you are sitting down  Be sure that nothing is pulling on the nephrostomy tube when you move around  Change positions if you see little or no urine in your drainage bag  Check to see if the urine tube is twisted or bent  Be sure that you are not sitting or lying on the tube  If there are no kinks and there is little or no urine in the drainage bag, tell your healthcare provider  Flush out the tube as directed  Some tubes get flushed one time a day with 10 mls of NSS You will be given a prescription for the flushes  To flush the nephrostomy tube, clean both connections with alcohol swap  Twist off the drainage bag tube and twist the saline syringe into the nephrostomy tube and flush briskly  Remove the syringe and twist the drainage bag tube back into the nephrostomy tube  Keep the site covered while you shower  Tape a piece of clear adhesive plastic over the dressing to keep it dry while you shower  Do not take tub baths      Contact Interventional Radiology at [phone] [place] PATIENTS: Contact Interventional Radiology at [phone]) Kathy Barrera PATIENTS: Contact Interventional Radiology at [phone]) if:  The skin around the nephrostomy tube is red, swollen, itches, or has a rash  You have a fever greater than 101 or chills  You have lower back or hip pain  There are changes in how your urine looks or smells  You have little or no urine draining from the nephrostomy tube  You have nausea and are vomiting  The black michelle on your tube has moved, or the tube is longer than when it was put in  You have questions or concerns about your condition or care  The nephrostomy tube comes out completely  There is blood, pus, or a bad smell coming from the place where the tube enters your skin  Urine is leaking around the tube  The following pharmacies carry the flush syringes  [facility]                     [facility]       [address]  Phone [phone]            Phone [phone]  [address] & [facility]                      [address]                                 [phone]  Phone [phone]            Phone [phone]    [facility]                                                                         [facility] [phone]  [address]   Phone [phone]

## 2022-11-15 NOTE — SEDATION DOCUMENTATION
The patient's left PCN bag was draining clear yellow urine  The right PCN bag was draining bloody urine (dark red)  He reports that it has been doing that for about a week    He states that he reported the findings to his urologist

## 2022-11-15 NOTE — H&P
Interventional Radiology  History and Physical 11/15/2022     Yamilex Banks   1945   [id number]    Assessment/Plan:  53-year-old male with history of bladder cancer status post TURBT and chemo radiation therapy with bilateral hydronephrosis status post bilateral PCN placement returns for routine 3 month catheter exchange  Problem List Items Addressed This Visit    None     Visit Diagnoses     Hydronephrosis, unspecified hydronephrosis type        Relevant Orders    IR nephrostomy tube check/change/reposition/reinsertion/upsize             Subjective:     Patient ID: Yamilex Banks is a 76 y o  male  History of Present Illness  Patient with history of bladder cancer status post TURBT and chemo radiation therapy with bilateral hydronephrosis status post bilateral PCN placement returns for routine 3 month catheter exchange      Review of Systems      Past Medical History:   Diagnosis Date   • Anemia     Last assessed: 9/28/17   • Anxiety    • Arteriosclerotic cardiovascular disease     Last assessed: 9/28/17   • Arthritis    • Bladder cancer ([facility] [address] )     bladder- had BCG treatments   • Chronic kidney disease     Stage IV   • CKD (chronic kidney disease) stage 4, GFR 15-29 ml/min ([facility])    • Colon polyp    • Coronary artery disease     7 stents   • Depression    • Diabetes mellitus ([facility])     IDDM   • GERD (gastroesophageal reflux disease)    • Glaucoma    • Hematuria    • History of fusion of cervical spine    • Hyperlipidemia    • Hypertension    • Insomnia     Last assessed: 11/14/12   • Loss of hearing     has hearing aids but usually does not wear them   • Other seasonal allergic rhinitis     Last assessed: 2/10/16   • PAD (peripheral artery disease) ([facility])    • Shortness of breath     on exertion   • Spinal stenosis of lumbar region    • Transient cerebral ischemia     No Residual   • Uses walker     w/c for longer distances        Past Surgical History:   Procedure Laterality Date   • CARDIAC SURGERY Cath stent placement  Last assessed: 3/9/17  Interventional Catheterization   • CHOLECYSTECTOMY     • COLONOSCOPY     • CYSTOSCOPY      Diagnostic w/biopsy  Orest Divers  Last assessed: 12/1/14   • CYSTOSCOPY N/A 4/12/2022    Procedure: CYSTOSCOPY  Bladder biopsies  ;  Surgeon: Adrain Phalen, MD;  [facility];  Service: Urology   • CYSTOSCOPY W/ RETROGRADES Right 3/1/2022    Procedure: CYSTO; stent removal retrograde;  Surgeon: Adrain Phalen, MD;  [facility];  Service: Urology   • CYSTOSCOPY W/ URETERAL STENT PLACEMENT Bilateral 10/18/2021    Procedure: bilateral retrogrades, cytology collection;  Surgeon: Adrain Phalen, MD;  [facility];  Service: Urology   • CYSTOURETHROSCOPY      w/cautery  Orest Divers   • FL RETROGRADE PYELOGRAM  10/18/2021   • FL RETROGRADE PYELOGRAM  10/24/2021   • GASTRIC BYPASS      For morbid obesity w/Shaji-en-Y   Resolved: 11/17/09   • INCISION AND DRAINAGE OF WOUND Right 2/26/2017    Procedure: INCISION AND DRAINAGE (I&D) EXTREMITY WITH APPLICATION OF GRAFT JACKET;  Surgeon: Alba Donato DPM;  [facility];  Service:    • INCISION AND DRAINAGE OF WOUND Right 4/25/2017    Procedure: INCISION AND DRAINAGE (I&D) EXTREMITY, APPLICATION OF GRAFT;  Surgeon: Alba Donato DPM;  [facility];  Service:    • IR BIOPSY OTHER  7/2/2020   • IR LOWER EXTREMITY ANGIOGRAM  2/8/2021   • IR LOWER EXTREMITY ANGIOGRAM  2/11/2021   • IR NEPHROSTOMY TUBE CHECK/CHANGE/REPOSITION/REINSERTION/UPSIZE  4/28/2022   • IR NEPHROSTOMY TUBE CHECK/CHANGE/REPOSITION/REINSERTION/UPSIZE  5/24/2022   • IR NEPHROSTOMY TUBE CHECK/CHANGE/REPOSITION/REINSERTION/UPSIZE  6/7/2022   • IR NEPHROSTOMY TUBE CHECK/CHANGE/REPOSITION/REINSERTION/UPSIZE  7/28/2022   • IR NEPHROSTOMY TUBE PLACEMENT  2/25/2022   • IR PORT PLACEMENT  1/17/2022   • IR THORACENTESIS  9/2/2022   • IR THORACENTESIS  9/14/2022   • IR TUNNELED CENTRAL LINE PLACEMENT  12/24/2020   • JOINT REPLACEMENT christofer knees replaced   • WV AMPUTATION METATARSAL+TOE,SINGLE Left 12/21/2020    Procedure: RAY RESECTION FOOT;  Surgeon: Barbara Almaguer DPM;  [facility];  Service: Podiatry   • WV AMPUTATION METATARSAL+TOE,SINGLE Left 12/31/2020    Procedure: 5TH MET RESECTION;  Surgeon: Barbara Almaguer DPM;  [facility];  Service: Podiatry   • WV CYSTOURETHROSCOPY W/IRRIG & EVAC CLOTS N/A 2/10/2021    Procedure: CYSTOSCOPY EVACUATION OF CLOTS, fulguration;  Surgeon: Deanna Balderas MD;  [facility];  Service: Urology   • WV CYSTOURETHROSCOPY W/IRRIG & EVAC CLOTS N/A 10/24/2021    Procedure: CYSTOSCOPY EVACUATION OF CLOT, fulguration of bleeding vessels, right ureter stent placement, retrograde pyelogram;  Surgeon: Alexy Montgomery MD;  [facility];  Service: Urology   • WV CYSTOURETHROSCOPY,BIOPSY N/A 8/16/2016    Procedure: CYSTOSCOPY WITH BIOPSIES;  Surgeon: Prescott Leyden, MD;  [facility];  Service: Urology   • WV CYSTOURETHROSCOPY,FULGUR <0 5 CM LESN N/A 11/19/2020    Procedure: CYSTO W/BIOPSIES, transurethral prostate bx;  Surgeon: Alice Haas MD;  [facility];  Service: Urology   • WV CYSTOURETHROSCOPY,FULGUR >5 CM LESN Bilateral 10/18/2021    Procedure: TRANSURETHRAL RESECTION OF BLADDER TUMOR (TURBT);   Surgeon: Alannah Lowe MD;  [facility];  Service: Urology   • WV Yash Sidney 3RD+ ORD SLCTV ABDL PEL/[facility] Left 2/8/2021    Procedure: LEG angiogram, CO2 w/limited contrast with balloon angioplasty postertior tibial artery;  Surgeon: Teresa Mccray MD;  [facility];  Service: Vascular   • ROTATOR CUFF REPAIR     • SMALL INTESTINE SURGERY      Surgery Shaji-en-Y   • SPINAL FUSION      lumbar and cervical fusions   • VAC DRESSING APPLICATION Right 3/41/9337    Procedure: APPLICATION VAC DRESSING;  Surgeon: Annette Cordero DPM;  [facility];  Service:    • WOUND DEBRIDEMENT Left 2/16/2021    Procedure: FOOT DEBRIDE, [address] OUT w/graft application;  Surgeon: Park Elaine DPM;  [facility];  Service: Podiatry        Social History     Tobacco Use   Smoking Status Former Smoker   • Packs/day: 3 00   • Years: 27 00   • Pack years: 81 00   • Types: Cigarettes   • Quit date: 5   • Years since quittin 9   Smokeless Tobacco Never Used        Social History     Substance and Sexual Activity   Alcohol Use Never    Comment: beer / liquor        Social History     Substance and Sexual Activity   Drug Use Not Currently   • Types: Marijuana    Comment: quit 2019 had medical marijuana        Allergies   Allergen Reactions   • Atorvastatin Hives, Itching and Rash   • Simvastatin Rash and Edema     Edema of lower legs   • Statins Hives and Itching   • Insulin Lispro Swelling and Edema     " Lower Legs"   • Other Itching, Rash and Other (See Comments)     "EKG Patches"   "blue EKG patches"       Current Outpatient Medications   Medication Sig Dispense Refill   • Accu-Chek Softclix Lancets lancets Test blood sugar 4 times daily 200 each 0   • acetaminophen (TYLENOL) 325 mg tablet Take 650 mg by mouth every 6 (six) hours as needed for mild pain       • ALPRAZolam (XANAX) 0 25 mg tablet Take 2 tablets (0 5 mg total) by mouth daily at bedtime as needed for anxiety 30 tablet 0   • ARIPiprazole (ABILIFY) 2 mg tablet Take 1 tablet (2 mg total) by mouth daily 30 tablet 0   • aspirin (ECOTRIN LOW STRENGTH) 81 mg EC tablet Take 1 tablet (81 mg total) by mouth daily     • Azelastine HCl 0 15 % SOLN Inhale 1 spray 2 (two) times a day 30 mL 3   • Bimatoprost (LUMIGAN OP) Apply 1 drop to eye daily at bedtime      • calcitriol (ROCALTROL) 0 25 mcg capsule Take 1 capsule (0 25 mcg total) by mouth daily (Patient not taking: Reported on 10/31/2022) 90 capsule 0   • cetirizine (ZyrTEC) 10 MG chewable tablet Chew 10 mg daily     • DULoxetine (CYMBALTA) 30 mg delayed release capsule TAKE ONE CAPSULE BY MOUTH EVERY DAY 90 capsule 0   • ezetimibe (ZETIA) 10 mg tablet Take 1 tablet (10 mg total) by mouth daily 90 tablet 1   • Ferrous Sulfate Dried (Feosol) 200 (65 Fe) MG TABS Take 65 mg by mouth daily (Patient not taking: Reported on 10/31/2022) 90 tablet 0   • fluocinonide (LIDEX) 0 05 % cream Apply topically 2 (two) times a day (Patient taking differently: Apply topically as needed) 30 g 0   • fluticasone (FLONASE) 50 mcg/act nasal spray 1 spray into each nostril daily (Patient taking differently: 1 spray into each nostril as needed) 11 mL 1   • furosemide (LASIX) 20 mg tablet Take 1 tablet (20 mg total) by mouth daily 90 tablet 1   • gabapentin (NEURONTIN) 300 mg capsule TAKE ONE CAPSULE BY MOUTH EVERY DAY AT BEDTIME 90 capsule 0   • glucose blood (Accu-Chek Martha Plus) test strip Test blood sugar 4 times daily 200 strip 0   • hydrocortisone 2 5 % cream Apply topically 2 (two) times a day as needed for irritation or rash 30 g 0   • Insulin Pen Needle 31G X 8 MM MISC Inject 3 times a day 100 each 2   • isosorbide mononitrate (IMDUR) 30 mg 24 hr tablet TAKE ONE TABLET BY MOUTH EVERY DAY IN THE MORNING 90 tablet 0   • Lantus SoloStar 100 units/mL injection pen 18 units qhs (Patient taking differently: Inject 18 Units under the skin daily at bedtime) 30 mL 1   • lidocaine (XYLOCAINE) 2 % topical gel Apply topically as needed for mild pain 30 mL 0   • lidocaine-prilocaine (EMLA) cream Apply topically as needed for mild pain 30 g 0   • loperamide (IMODIUM) 2 mg capsule Take 2 mg by mouth 4 (four) times a day as needed for diarrhea     • metoprolol succinate (TOPROL-XL) 100 mg 24 hr tablet TAKE ONE TABLET BY MOUTH EVERY DAY 90 tablet 0   • multivitamin (THERAGRAN) TABS Take 1 tablet by mouth daily  • naloxone (NARCAN) 4 mg/0 1 mL nasal spray Administer 1 spray into a nostril  If no response after 2-3 minutes, give another dose in the other nostril using a new spray   (Patient not taking: Reported on 10/31/2022) 1 each 1   • nitrofurantoin (MACROBID) 100 mg capsule Take 1 capsule (100 mg total) by mouth 2 (two) times a day (Patient not taking: Reported on 10/31/2022) 10 capsule 0   • NovoLOG FlexPen 100 units/mL injection pen 10 units with each meal  (Patient taking differently: Inject 10 Units under the skin 3 (three) times a day with meals) 5 pen 1   • omeprazole (PriLOSEC) 20 mg delayed release capsule Take 1 capsule (20 mg total) by mouth daily in the early morning PRN 90 capsule 0   • ondansetron (Zofran ODT) 8 mg disintegrating tablet Take 1 tablet (8 mg total) by mouth every 8 (eight) hours as needed for nausea or vomiting 20 tablet 0   • oxybutynin (DITROPAN-XL) 10 MG 24 hr tablet TAKE ONE TABLET BY MOUTH EVERY DAY 30 tablet 0   • oxyCODONE (OxyCONTIN) 10 mg 12 hr tablet Take 1 tablet (10 mg total) by mouth every 8 (eight) hours Max Daily Amount: 30 mg 90 tablet 0   • oxyCODONE (Roxicodone) 5 immediate release tablet Take 1 tablet (5 mg total) by mouth every 4 (four) hours as needed for moderate pain Max Daily Amount: 30 mg 60 tablet 0   • Patiromer Sorbitex Calcium 8 4 g PACK Take 8 4 packets by mouth     • phenazopyridine (PYRIDIUM) 200 mg tablet Take 1 tablet (200 mg total) by mouth 3 (three) times a day with meals 10 tablet 0   • polyethylene glycol (MIRALAX) 17 g packet Take 17 g by mouth daily 30 each 0   • predniSONE 20 mg tablet Take 1 tablet (20 mg total) by mouth daily (Patient not taking: Reported on 10/31/2022) 5 tablet 0   • senna (SENOKOT) 8 6 MG tablet Take 2 tablets (17 2 mg total) by mouth 2 (two) times a day 90 tablet 0   • sodium chloride, PF, 0 9 % 10 mL by Intracatheter route daily Intracatheter flushing daily 900 mL 0   • sodium chloride, PF, 0 9 % 10 mL by Intracatheter route daily Intracatheter flushing daily 900 mL 0   • sodium chloride, PF, 0 9 % 10 mL by Intracatheter route daily Bilateral PCNs to be flushed daily with prefilled 10cc  mL 3   • sulfamethoxazole-trimethoprim (BACTRIM DS) 800-160 mg per tablet Take 1 tablet by mouth every 12 (twelve) hours for 7 days 14 tablet 0     No current facility-administered medications for this encounter  Objective: There were no vitals filed for this visit  Physical Exam  Constitutional:       Appearance: Normal appearance  Pulmonary:      Effort: Pulmonary effort is normal    Genitourinary:     Comments: Bilateral PCNs in place          Lab Results   Component Value Date    BNP 10 02/10/2016      Lab Results   Component Value Date    WBC 5 79 11/10/2022    HGB 8 5 (L) 11/10/2022    HCT 26 0 (L) 11/10/2022    MCV 95 11/10/2022     11/10/2022     Lab Results   Component Value Date    INR 1 04 05/04/2022    INR 1 07 04/06/2022    INR 1 06 02/23/2022    PROTIME 13 3 05/04/2022    PROTIME 13 6 04/06/2022    PROTIME 13 6 02/23/2022     Lab Results   Component Value Date    PTT 33 05/04/2022         I have personally reviewed pertinent imaging and laboratory results  Code Status: Prior  Advance Directive and Living Will:      Power of :    POLST:      This text is generated with voice recognition software  There may be translation, syntax,  or grammatical errors  If you have any questions, please contact the dictating provider

## 2022-11-15 NOTE — BRIEF OP NOTE (RAD/CATH)
INTERVENTIONAL RADIOLOGY PROCEDURE NOTE    Date: 11/15/2022    Procedure:   Procedure Summary     Date: 11/15/22 Room / Location: [address] [place] [address] Interventional Radiology    Anesthesia Start:  Anesthesia Stop:     Procedure: IR NEPHROSTOMY TUBE CHECK/CHANGE/REPOSITION/REINSERTION/UPSIZE Diagnosis:       Hydronephrosis, unspecified hydronephrosis type      (routine exchange)    Scheduled Providers:  Responsible Provider:     Anesthesia Type: Not recorded ASA Status: Not recorded          Preoperative diagnosis:   1  Hydronephrosis, unspecified hydronephrosis type         Postoperative diagnosis: Same  Surgeon: Marquita Castro MD     Assistant: None  No qualified resident was available  Blood loss: None    Specimens: None     Findings: Successful bilateral PCN exchange  Complications: None immediate      Anesthesia: local

## 2022-11-15 NOTE — PROGRESS NOTES
Follow-up - Radiation Oncology   Alena De Leon 1945 76 y o  male [id number]    Cancer Staging  Malignant neoplasm of anterior wall of urinary bladder ([facility] [address] )  Staging form: Urinary Bladder, AJCC 8th Edition  - Clinical stage from 10/18/2021: Stage II (cT2, cN0, cM0) - Signed by Germain Angel MD on 11/17/2021  Stage prefix: Initial diagnosis    Assessment/Plan:  Alena De Leon is a 64 y o  male with bladder carcinoma in situ initially diagnosed in [id number], treated with multiple TURBT and BCG treatments at [facility] and he was following with Urology Oncology at [facility]  TURBT in October 2021 at [facility] revealed 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  His right renal pelvis washing cytology was suspicious for high grade urothelial carcinoma  CT C/A/P without contrast showed no metastatic disease within limitations of noncontrast technique  His case was discussed at tumor board and he was not deemed to be surgical candidate, and offered concurrent chemoRT completing on 3/24/22  He presents for 8 month f/u s/p treatment  In the interim he developed progression of metastatic disease and has been on Keytruda and then Enfortumab  He had his PCN drains changed today  He notes hematuria from the drain on the right and occassionally from the penis  He has brought this up with nephrology who deferred to urology  He denies bowel complaints  He notes some skin change while on systemic therapy    -He does not have urology f/u at this time  Encouraged to schedule this soon for endoscopic surveillance  -CT Surveillance scheduled by medical oncology  -Continue systemic therapy  -RTC in 1 year  11 17 22  Infusion -See list for full schedule  11 18 22  Med Onc   11 22 22  Cardio Thoracic  12 12 22  Palliative   12 16 22 Med Onc     No orders of the defined types were placed in this encounter         History of Present Illness   Interval History:  Alena De Leon 1945 is a 76 y o  male 64 year old male with muscle invasive bladder cancer, history of spinal stenosis, gastric bypass in 2011 and bladder carcinoma in situ initially diagnosed in [id number], treated with multiple TURBT and BCG treatments at [facility] and he was following with Urology Oncology at [facility]  Most recent TURBT in October 2021 at [facility] revealed 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  He is not a surgical candidate  The pt completed a course of radiation on 03/24/22  Last visit in radiation oncology was an EOT telemedicine visit on 5/2/22 8/30/22  Bone Scan  IMPRESSION:   1   No scintigraphic evidence of osseous metastasis  2   No focal tracer activity involving the ribs to suggest acute fracture  3   The etiology of the patient's clinical symptomatology is not identified on bone scan      9/2/22  [place]  Plan is to D/C Keytruda and switch to Enfortumab every 28 days  Restaging CT CAP in 3 months     10/4/22  CT chest abdomen pelvis  IMPRESSION:   1   Improved left para-aortic lymphadenopathy compared to 8/26/2022, compatible with partial response to therapy    2   Stable eccentrically thickened bladder wall anterolaterally, which is underdistended, likely known bladder neoplasm    3   Increase in size of a subsolid pulmonary nodule in the inferior lingular segment, unclear if infectious/inflammatory or progression of metastatic disease   Recommend short-term interval follow-up CT in 3 months for interval change    4  Improved trace left pleural effusion with indwelling pleural catheter  5   Findings suggestive of proctitis      10/17/22  Urology   Bactrim prescribed for UTI     10/26/22  Urology [facility]) Telemed visit  Mild LUTS symptoms  Encouraged to schedule endoscopic assessment      11/15/22 B/L nephrostomy tube check and changed in IR  Today he notes feeling well overall   He does have some skin color change recently as well as blood in the right PCN output and occasionally when passing urine through the penis  Historical Information   Oncology History   Bladder carcinoma ([facility] [address] )   8/16/2016 Initial Diagnosis    Bladder carcinoma ([facility] [address] )     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)    Initial Diagnosis    Malignant neoplasm of anterior wall of urinary bladder ([facility] [address] )     1994 Surgery    TURBT      1994 - 1995 Chemotherapy    Induction intravesical BCG x 2      8/17/2016 Surgery    TURBT      12/13/2016 Surgery    TURBT  [facility])    Bladder, left posterior wall, biopsy: High-grade urothelial carcinoma in-situ  Muscularis propria is identified with no tumor seen  Bladder, trigone, biopsy: Non-invasive high-grade papillary urothelial carcinoma, and flat urothelial carcinoma in-situ  Muscularis propria is not identified  1/4/2017 - 2/8/2017 Chemotherapy    Induction intravesical BCG     6/19/2017 Surgery    TURBT  [facility])    - Posterior wall, biopsy: Mild chronic cystitis with focal urothelial atypia  Muscularis propria is identified      - Right lateral wall, biopsy: Granulomatous cystitis  Muscularis propria is identified  - Left lateral wall, biopsy: Non invasive high-grade urothelial carcinoma  Muscularis propria is not identified  4/26/2019 Surgery    TURBT   [facility])     - bladder, right posterior wall, biopsy: Urothelial carcinoma in situ   - bladder, right lateral wall, biopsy: Small focus of urothelial carcinoma in situ  - bladder, left posterior wall, biopsy: Urothelial carcinoma in situ   - bladder, left lateral wall, biopsy: Urothelial carcinoma in situ     - bladder, trigone, biopsy: Invasive high-grade urothelial carcinoma, invading into lamina propria  No lymphovascular invasion seen  Muscularis propria not present     Separate piece showing urothelial carcinoma in situ        7/2019 -  Chemotherapy    Induction intravesical BCG x 4      11/4/2019 Surgery    TURBT  [facility])    - Superficial bladder tumor, transurethral resection: Non-invasive high grade urothelial carcinoma, with urothelial carcinoma in situ  Muscularis propria is not identified      - Bladder tumor, transurethral resection: Non-invasive high grade urothelial carcinoma, with urothelial carcinoma in situ, see note  Muscularis propria is present and free of tumor  12/9/2019 - 1/28/2020 Chemotherapy    Induction intravesical BCG+INF        6/9/2020 - 6/23/2020 Chemotherapy    Maintenance intravesical BCG+INF        11/19/2020 Biopsy    TURBT ([facility], Dr Kaci Otto)    A  Urinary Bladder, Left Posterior Bladder Wall:  -Extensively- denuded urothelial lined mucosa with mild chronic inflammation, vascular congestion  And edema   -Unremarkable small fragment of detrusor muscle present  -No evidence of invasive urothelial carcinoma seen     B  Urinary Bladder, Left lateral bladder Wall:  -Partially-denuded urothelial lined mucosa with mild  chronic inflammation  -Unremarkable small fragment of detrusor muscle present  -No evidence of invasive urothelial carcinoma seen     C  Urinary Bladder, Right Posterior Bladder wall:  -Urothelial lined mucosa with mild  chronic inflammation Von Brunn nests and mild urothelial atypia, possibly due to previous BCG administration   -Unremarkable small fragment of detrusor muscle present  -No evidence of invasive urothelial carcinoma seen     D  Urinary Bladder, Right Lateral Bladder Wall:  - Urothelial lined mucosa with mild  chronic inflammation   -Muscularis propria/ detrusor muscle is not present for evaluation    -No evidence of invasive urothelial carcinoma seen  E  Prostate, Prostate Tissue:  -Urothelial lined mucosa with mild chronic inflammation, prominent Von Brunn nests and Cystitis cystica   -Unremarkable small fragment of detrusor muscle present   -No evidence of malignancy seen               10/18/2021 -  Cancer Staged    Staging form: Urinary Bladder, AJCC 8th Edition  - Clinical stage from 10/18/2021: Stage II (cT2, cN0, cM0) - Signed by William Crarillo MD on 11/17/2021  Stage prefix: Initial diagnosis       10/18/2021 Surgery    TURBT ([facility])    A  Left posterior wall, bladder (transurethral resection):     - Urothelial tissue with small atypical cauterized focus favored to represent superficially invasive high-grade urothelial carcinoma  - Muscularis propria (detrusor muscle) present, and negative for carcinoma  - Marked edema and mucosal denudation with thermal artifact noted  B  Anterior wall, bladder (transurethral resection):      - Invasive high-grade urothelial carcinoma  - Carcinoma invades muscularis propria (detrusor muscle)  C   Left lateral wall, bladder (transurethral resection):     - Urothelial tissue with small atypical focus with marked thermal artifact; cannot exclude superficial carcinoma  - Muscularis propria (detrusor muscle) present, and negative for carcinoma        - Marked edema and mucosal denudation with thermal artifact noted     1/24/2022 - 3/26/2022 Chemotherapy    mitoMYcin (MUTAMYCIN), 12 mg/m2 = 28 7 mg (100 % of original dose 12 mg/m2), Intravenous, Once, 1 of 1 cycle  Dose modification: 12 mg/m2 (original dose 12 mg/m2, Cycle 1), 3 mg/m2 (original dose 12 mg/m2, Cycle 1)  Administration: 7 2 mg (1/24/2022)  fluorouracil (ADRUCIL) ambulatory infusion Soln, 500 mg/m2/day = 4,780 mg (50 % of original dose 1,000 mg/m2/day), Intravenous, Over 96 hours, 1 of 1 cycle, Start date: 1/18/2022, End date: 1/23/2022  Dose modification: 500 mg/m2/day (original dose 1,000 mg/m2/day, Cycle 1, Reason: Dose modified as per discussion with consulting physician)     1/24/2022 - 3/24/2022 Radiation    Pelvis:1 6X 21 / 21 275 0 5,775 59      Treatment dates:  C1: 1/24/2022 - 3/24/2022     7/15/2022 - 8/26/2022 Chemotherapy    pembrolizumab (KEYTRUDA) IVPB, 400 mg, Intravenous, Once, 2 of 6 cycles  Administration: 400 mg (7/15/2022), 400 mg (8/26/2022)     9/16/2022 - Chemotherapy    enfortumab vedotin-ejfv (PADCEV) IVPB, 125 mg (original dose 1 25 mg/kg), Intravenous, Once, 3 of 6 cycles  Dose modification: 125 mg (original dose 1 25 mg/kg, Cycle 1, Reason: Dose modified as per discussion with consulting physician)  Administration: 120 mg (9/16/2022), 120 mg (9/23/2022), 120 mg (9/30/2022), 120 mg (10/14/2022), 120 mg (10/21/2022), 120 mg (10/28/2022), 120 mg (11/11/2022)         Past Medical History:   Diagnosis Date   • Anemia     Last assessed: 9/28/17   • Anxiety    • Arteriosclerotic cardiovascular disease     Last assessed: 9/28/17   • Arthritis    • Bladder cancer ([facility] )     bladder- had BCG treatments   • Chronic kidney disease     Stage IV   • CKD (chronic kidney disease) stage 4, GFR 15-29 ml/min ([facility])    • Colon polyp    • Coronary artery disease     7 stents   • Depression    • Diabetes mellitus ([facility])     IDDM   • GERD (gastroesophageal reflux disease)    • Glaucoma    • Hematuria    • History of fusion of cervical spine    • Hyperlipidemia    • Hypertension    • Insomnia     Last assessed: 11/14/12   • Loss of hearing     has hearing aids but usually does not wear them   • Lung cancer ([facility] [address] )    • Metastatic cancer ([facility] )    • Other seasonal allergic rhinitis     Last assessed: 2/10/16   • PAD (peripheral artery disease) ([facility])    • Shortness of breath     on exertion   • Spinal stenosis of lumbar region    • Transient cerebral ischemia     No Residual   • Uses walker     w/c for longer distances     Past Surgical History:   Procedure Laterality Date   • CARDIAC SURGERY      Cath stent placement  Last assessed: 3/9/17  Interventional Catheterization   • CHOLECYSTECTOMY     • COLONOSCOPY     • CYSTOSCOPY      Diagnostic w/biopsy  Jerrol Roys  Last assessed: 12/1/14   • CYSTOSCOPY N/A 04/12/2022    Procedure: CYSTOSCOPY  Bladder biopsies  ;  Surgeon: Amira Nina MD;  [facility];  Service: Urology   • CYSTOSCOPY W/ RETROGRADES Right 03/01/2022    Procedure: CYSTO; stent removal retrograde;  Surgeon: Alannah Lowe MD;  [facility];  Service: Urology   • CYSTOSCOPY W/ URETERAL STENT PLACEMENT Bilateral 10/18/2021    Procedure: bilateral retrogrades, cytology collection;  Surgeon: Alannah Lowe MD;  [facility];  Service: Urology   • CYSTOURETHROSCOPY      w/cautery  Marvell Severs   • FL RETROGRADE PYELOGRAM  10/18/2021   • FL RETROGRADE PYELOGRAM  10/24/2021   • GASTRIC BYPASS      For morbid obesity w/Shaji-en-Y   Resolved: 11/17/09   • INCISION AND DRAINAGE OF WOUND Right 02/26/2017    Procedure: INCISION AND DRAINAGE (I&D) EXTREMITY WITH APPLICATION OF GRAFT JACKET;  Surgeon: Annette Cordero DPM;  [facility];  Service:    • INCISION AND DRAINAGE OF WOUND Right 04/25/2017    Procedure: INCISION AND DRAINAGE (I&D) EXTREMITY, APPLICATION OF GRAFT;  Surgeon: Annette Cordero DPM;  [facility];  Service:    • IR BIOPSY OTHER  07/02/2020   • IR LOWER EXTREMITY ANGIOGRAM  02/08/2021   • IR LOWER EXTREMITY ANGIOGRAM  02/11/2021   • IR NEPHROSTOMY TUBE CHECK/CHANGE/REPOSITION/REINSERTION/UPSIZE  04/28/2022   • IR NEPHROSTOMY TUBE CHECK/CHANGE/REPOSITION/REINSERTION/UPSIZE  05/24/2022   • IR NEPHROSTOMY TUBE CHECK/CHANGE/REPOSITION/REINSERTION/UPSIZE  06/07/2022   • IR NEPHROSTOMY TUBE CHECK/CHANGE/REPOSITION/REINSERTION/UPSIZE  07/28/2022   • IR NEPHROSTOMY TUBE CHECK/CHANGE/REPOSITION/REINSERTION/UPSIZE  11/15/2022   • IR NEPHROSTOMY TUBE PLACEMENT  02/25/2022   • IR PORT PLACEMENT  01/17/2022   • IR THORACENTESIS  09/02/2022   • IR THORACENTESIS  09/14/2022   • IR THORACENTESIS WITH TUBE PLACEMENT Left     october   • IR TUNNELED CENTRAL LINE PLACEMENT  12/24/2020   • JOINT REPLACEMENT      christofer knees replaced   • WA AMPUTATION METATARSAL+TOE,SINGLE Left 12/21/2020    Procedure: RAY RESECTION FOOT;  Surgeon: Barbara Almaguer DPM;  [facility];  Service: Podiatry   • WA AMPUTATION METATARSAL+TOE,SINGLE Left 12/31/2020 Procedure: 5TH MET RESECTION;  Surgeon: Fred Celestin DPM;  [facility];  Service: Podiatry   • TX CYSTOURETHROSCOPY W/IRRIG & EVAC CLOTS N/A 02/10/2021    Procedure: CYSTOSCOPY EVACUATION OF CLOTS, fulguration;  Surgeon: Sharlene Szymanski MD;  [facility];  Service: Urology   • TX CYSTOURETHROSCOPY W/IRRIG & EVAC CLOTS N/A 10/24/2021    Procedure: CYSTOSCOPY EVACUATION OF CLOT, fulguration of bleeding vessels, right ureter stent placement, retrograde pyelogram;  Surgeon: Hansel Jacobo MD;  [facility];  Service: Urology   • TX CYSTOURETHROSCOPY,BIOPSY N/A 08/16/2016    Procedure: Heidy Crawford;  Surgeon: María Elena Craig MD;  [facility];  Service: Urology   • TX CYSTOURETHROSCOPY,FULGUR <0 5 CM LESN N/A 11/19/2020    Procedure: CYSTO W/BIOPSIES, transurethral prostate bx;  Surgeon: Miguelito Dale MD;  [facility];  Service: Urology   • TX CYSTOURETHROSCOPY,FULGUR >5 CM LESN Bilateral 10/18/2021    Procedure: TRANSURETHRAL RESECTION OF BLADDER TUMOR (TURBT);   Surgeon: Amira Nina MD;  [facility];  Service: Urology   • TX Erica Mullet 3RD+ ORD [address] PEL/[facility] Left 02/08/2021    Procedure: LEG angiogram, CO2 w/limited contrast with balloon angioplasty postertior tibial artery;  Surgeon: Jose Manuel Wayne MD;  [facility];  Service: Vascular   • ROTATOR CUFF REPAIR     • SMALL INTESTINE SURGERY      Surgery Shaji-en-Y   • SPINAL FUSION      lumbar and cervical fusions   • VAC DRESSING APPLICATION Right 05/99/5974    Procedure: APPLICATION VAC DRESSING;  Surgeon: Wilmar Oden DPM;  [facility];  Service:    • WOUND DEBRIDEMENT Left 02/16/2021    Procedure: FOOT DEBRIDE, [address] OUT w/graft application;  Surgeon: Wilmar Oden DPM;  [facility];  Service: Podiatry       Social History   Social History     Substance and Sexual Activity   Alcohol Use Never    Comment: beer / liquor     Social History     Substance and Sexual Activity   Drug Use Not Currently   • Types: Marijuana    Comment: quit 2019 had medical marijuana     Social History     Tobacco Use   Smoking Status Former Smoker   • Packs/day: 3 00   • Years: 27 00   • Pack years: 81 00   • Types: Cigarettes   • Quit date: 5   • Years since quittin 9   Smokeless Tobacco Never Used         Meds/Allergies     Current Outpatient Medications:   •  Accu-Chek Softclix Lancets lancets, Test blood sugar 4 times daily, Disp: 200 each, Rfl: 0  •  acetaminophen (TYLENOL) 325 mg tablet, Take 650 mg by mouth every 6 (six) hours as needed for mild pain  , Disp: , Rfl:   •  ALPRAZolam (XANAX) 0 25 mg tablet, Take 2 tablets (0 5 mg total) by mouth daily at bedtime as needed for anxiety, Disp: 30 tablet, Rfl: 0  •  aspirin (ECOTRIN LOW STRENGTH) 81 mg EC tablet, Take 1 tablet (81 mg total) by mouth daily, Disp: , Rfl:   •  Azelastine HCl 0 15 % SOLN, Inhale 1 spray 2 (two) times a day, Disp: 30 mL, Rfl: 3  •  Bimatoprost (LUMIGAN OP), Apply 1 drop to eye daily at bedtime , Disp: , Rfl:   •  calcitriol (ROCALTROL) 0 25 mcg capsule, Take 1 capsule (0 25 mcg total) by mouth daily, Disp: 90 capsule, Rfl: 0  •  cetirizine (ZyrTEC) 10 MG chewable tablet, Chew 10 mg daily, Disp: , Rfl:   •  DULoxetine (CYMBALTA) 30 mg delayed release capsule, TAKE ONE CAPSULE BY MOUTH EVERY DAY, Disp: 90 capsule, Rfl: 0  •  ezetimibe (ZETIA) 10 mg tablet, Take 1 tablet (10 mg total) by mouth daily, Disp: 90 tablet, Rfl: 1  •  Ferrous Sulfate Dried (Feosol) 200 (65 Fe) MG TABS, Take 65 mg by mouth daily, Disp: 90 tablet, Rfl: 0  •  fluocinonide (LIDEX) 0 05 % cream, Apply topically 2 (two) times a day (Patient taking differently: Apply topically as needed), Disp: 30 g, Rfl: 0  •  fluticasone (FLONASE) 50 mcg/act nasal spray, 1 spray into each nostril daily (Patient taking differently: 1 spray into each nostril as needed), Disp: 11 mL, Rfl: 1  •  furosemide (LASIX) 20 mg tablet, Take 1 tablet (20 mg total) by mouth daily, Disp: 90 tablet, Rfl: 1  •  gabapentin (NEURONTIN) 300 mg capsule, TAKE ONE CAPSULE BY MOUTH EVERY DAY AT BEDTIME, Disp: 90 capsule, Rfl: 0  •  glucose blood (Accu-Chek Martha Plus) test strip, Test blood sugar 4 times daily, Disp: 200 strip, Rfl: 0  •  hydrocortisone 2 5 % cream, Apply topically 2 (two) times a day as needed for irritation or rash, Disp: 30 g, Rfl: 0  •  Insulin Pen Needle 31G X 8 MM MISC, Inject 3 times a day, Disp: 100 each, Rfl: 2  •  isosorbide mononitrate (IMDUR) 30 mg 24 hr tablet, TAKE ONE TABLET BY MOUTH EVERY DAY IN THE MORNING, Disp: 90 tablet, Rfl: 0  •  Lantus SoloStar 100 units/mL injection pen, 18 units qhs (Patient taking differently: Inject 18 Units under the skin daily at bedtime), Disp: 30 mL, Rfl: 1  •  lidocaine (XYLOCAINE) 2 % topical gel, Apply topically as needed for mild pain, Disp: 30 mL, Rfl: 0  •  lidocaine-prilocaine (EMLA) cream, Apply topically as needed for mild pain, Disp: 30 g, Rfl: 0  •  loperamide (IMODIUM) 2 mg capsule, Take 2 mg by mouth 4 (four) times a day as needed for diarrhea, Disp: , Rfl:   •  metoprolol succinate (TOPROL-XL) 100 mg 24 hr tablet, TAKE ONE TABLET BY MOUTH EVERY DAY, Disp: 90 tablet, Rfl: 0  •  multivitamin (THERAGRAN) TABS, Take 1 tablet by mouth daily  , Disp: , Rfl:   •  naloxone (NARCAN) 4 mg/0 1 mL nasal spray, Administer 1 spray into a nostril   If no response after 2-3 minutes, give another dose in the other nostril using a new spray , Disp: 1 each, Rfl: 1  •  NovoLOG FlexPen 100 units/mL injection pen, 10 units with each meal  (Patient taking differently: Inject 10 Units under the skin 3 (three) times a day with meals), Disp: 5 pen, Rfl: 1  •  omeprazole (PriLOSEC) 20 mg delayed release capsule, Take 1 capsule (20 mg total) by mouth daily in the early morning PRN, Disp: 90 capsule, Rfl: 0  •  ondansetron (Zofran ODT) 8 mg disintegrating tablet, Take 1 tablet (8 mg total) by mouth every 8 (eight) hours as needed for nausea or vomiting, Disp: 20 tablet, Rfl: 0  •  oxybutynin (DITROPAN-XL) 10 MG 24 hr tablet, TAKE ONE TABLET BY MOUTH EVERY DAY, Disp: 30 tablet, Rfl: 0  •  oxyCODONE (Roxicodone) 5 immediate release tablet, Take 1 tablet (5 mg total) by mouth every 4 (four) hours as needed for moderate pain Max Daily Amount: 30 mg, Disp: 60 tablet, Rfl: 0  •  Patiromer Sorbitex Calcium 8 4 g PACK, Take 8 4 packets by mouth, Disp: , Rfl:   •  phenazopyridine (PYRIDIUM) 200 mg tablet, Take 1 tablet (200 mg total) by mouth 3 (three) times a day with meals, Disp: 10 tablet, Rfl: 0  •  polyethylene glycol (MIRALAX) 17 g packet, Take 17 g by mouth daily, Disp: 30 each, Rfl: 0  •  predniSONE 20 mg tablet, Take 1 tablet (20 mg total) by mouth daily, Disp: 5 tablet, Rfl: 0  •  senna (SENOKOT) 8 6 MG tablet, Take 2 tablets (17 2 mg total) by mouth 2 (two) times a day, Disp: 90 tablet, Rfl: 0  •  sulfamethoxazole-trimethoprim (BACTRIM DS) 800-160 mg per tablet, Take 1 tablet by mouth every 12 (twelve) hours for 7 days, Disp: 14 tablet, Rfl: 0  •  ARIPiprazole (ABILIFY) 2 mg tablet, Take 1 tablet (2 mg total) by mouth daily, Disp: 30 tablet, Rfl: 0  •  nitrofurantoin (MACROBID) 100 mg capsule, Take 1 capsule (100 mg total) by mouth 2 (two) times a day (Patient not taking: Reported on 11/15/2022), Disp: 10 capsule, Rfl: 0  •  oxyCODONE (OxyCONTIN) 10 mg 12 hr tablet, Take 1 tablet (10 mg total) by mouth every 8 (eight) hours Max Daily Amount: 30 mg, Disp: 90 tablet, Rfl: 0  •  sodium chloride, PF, 0 9 %, 10 mL by Intracatheter route daily Intracatheter flushing daily, Disp: 900 mL, Rfl: 0  •  sodium chloride, PF, 0 9 %, 10 mL by Intracatheter route daily Intracatheter flushing daily, Disp: 900 mL, Rfl: 0  •  sodium chloride, PF, 0 9 %, 10 mL by Intracatheter route daily Bilateral PCNs to be flushed daily with prefilled 10cc NS, Disp: 600 mL, Rfl: 3  No current facility-administered medications for this visit    Allergies   Allergen Reactions • Atorvastatin Hives, Itching and Rash   • Simvastatin Rash and Edema     Edema of lower legs   • Statins Hives and Itching   • Insulin Lispro Swelling and Edema     " Lower Legs"   • Other Itching, Rash and Other (See Comments)     "EKG Patches"   "blue EKG patches"         Review of Systems   Constitutional: Positive for appetite change (decreased ) and fatigue  Gastric bypass hx 2011   HENT: Positive for hearing loss (Has hearing aids/does not wear them on most occasions  Hearing loss  related  )  Eyes:        Early stages of glaucoma  Reading glasses  Taking eye drops   Respiratory: Positive for shortness of breath (Angio-seven [id number])  Left chest pleuravac tube    Cardiovascular: Positive for chest pain (Occasional "chest discomfort"/Cardio follow  loop placed last week )  Negative for palpitations and leg swelling  Gastrointestinal: Positive for constipation (d/t iron )  Negative for blood in stool (Has noted blood in the past  "Not recent" ) and diarrhea (Diet related at times  )  Endocrine: Positive for cold intolerance  Genitourinary: Positive for decreased urine volume, flank pain (b/l d/t nephrostomy tubes ), frequency, hematuria (left nephrostomy bag ) and penile pain  Negative for dysuria  Frequency/Urgency/Dysuria  Hematuria with passing urine and in rt nephrostomy tube  Nocturia q  1 5-2 hrs/uses urinal at night   Unable to "make it to the bathroom at times"  Flomax has proven no urinary changes      Musculoskeletal: Positive for neck pain  Amputation left small toe/uses cane to ambulate/offers stability   Skin: Positive for color change (change d/t ?? chemo  skin color change )  Negative for rash  Allergic/Immunologic: Positive for environmental allergies  Negative for food allergies  Neurological: Positive for dizziness and weakness  Hematological: Bruises/bleeds easily  Blood I rt nephrostomy tube bright red  Psychiatric/Behavioral: Positive for sleep disturbance  Negative for suicidal ideas  Screen for moderate depression  Denies need for counseling, seeing counselor at [facility] and [place]  OBJECTIVE:   /88   Pulse 67   Temp (!) 97 2 °F (36 2 °C) (Temporal)   Resp 16   Wt 95 7 kg (211 lb)   SpO2 98%   BMI 27 09 kg/m²   Pain Assessment:  0  ECOG/[place]ub[place]/WHO: 1 - Symptomatic but completely ambulatory    Physical Exam   General Appearance:  Alert, cooperative, no distress, appears stated age  Lungs: Respirations unlabored, no cyanosis, able to speak in complete sentences without dyspnea  Chest tube in place which he reports has some clear drainage (not examined today)  Skin: Patchy dermatitis likely related to systemic therapy  : Blood in the right PCN catheter bag  He states that the left PCN bag is without blood  Neurologic: ANOx3, speech and cognition intact      RESULTS    Lab Results:   Recent Results (from the past 672 hour(s))   Comprehensive metabolic panel    Collection Time: 10/20/22  9:52 AM   Result Value Ref Range    Sodium 138 135 - 147 mmol/L    Potassium 4 4 3 5 - 5 3 mmol/L    Chloride 106 96 - 108 mmol/L    CO2 23 21 - 32 mmol/L    ANION GAP 9 5 - 14 mmol/L    BUN 35 (H) 5 - 25 mg/dL    Creatinine 2 93 (H) 0 70 - 1 50 mg/dL    Glucose 100 (H) 70 - 99 mg/dL    Calcium 8 7 8 4 - 10 2 mg/dL    AST 56 17 - 59 U/L    ALT 62 (H) <50 U/L    Alkaline Phosphatase 108 43 - 122 U/L    Total Protein 7 4 6 4 - 8 4 g/dL    Albumin 3 8 3 5 - 5 0 g/dL    Total Bilirubin 0 40 0 20 - 1 00 mg/dL    eGFR 19 ml/min/1 73sq m   TSH, 3rd generation with Free T4 reflex    Collection Time: 10/20/22  9:52 AM   Result Value Ref Range    TSH 3RD GENERATON 2 870 0 450 - 4 500 uIU/mL   Magnesium    Collection Time: 10/20/22  9:52 AM   Result Value Ref Range    Magnesium 2 0 1 6 - 2 3 mg/dL   Hemoglobin A1C    Collection Time: 10/20/22  9:52 AM   Result Value Ref Range    Hemoglobin A1C 7 2 (H) Normal 3 8-5 6%; PreDiabetic 5 7-6 4%;  Diabetic >=6 5%; Glycemic control for adults with diabetes <7 0% %     mg/dl   CBC and differential    Collection Time: 10/20/22  9:55 AM   Result Value Ref Range    WBC 6 84 4 31 - 10 16 Thousand/uL    RBC 3 17 (L) 3 88 - 5 62 Million/uL    Hemoglobin 9 5 (L) 12 0 - 17 0 g/dL    Hematocrit 30 2 (L) 36 5 - 49 3 %    MCV 95 82 - 98 fL    MCH 30 0 26 8 - 34 3 pg    MCHC 31 5 31 4 - 37 4 g/dL    RDW 15 9 (H) 11 6 - 15 1 %    MPV 9 7 8 9 - 12 7 fL    Platelets 563 886 - 095 Thousands/uL    nRBC 0 /100 WBCs    Neutrophils Relative 63 43 - 75 %    Immat GRANS % 1 0 - 2 %    Lymphocytes Relative 21 14 - 44 %    Monocytes Relative 11 4 - 12 %    Eosinophils Relative 3 0 - 6 %    Basophils Relative 1 0 - 1 %    Neutrophils Absolute 4 38 1 85 - 7 62 Thousands/µL    Immature Grans Absolute 0 04 0 00 - 0 20 Thousand/uL    Lymphocytes Absolute 1 40 0 60 - 4 47 Thousands/µL    Monocytes Absolute 0 78 0 17 - 1 22 Thousand/µL    Eosinophils Absolute 0 19 0 00 - 0 61 Thousand/µL    Basophils Absolute 0 05 0 00 - 0 10 Thousands/µL   CBC and differential    Collection Time: 10/27/22 10:13 AM   Result Value Ref Range    WBC 7 63 4 31 - 10 16 Thousand/uL    RBC 2 71 (L) 3 88 - 5 62 Million/uL    Hemoglobin 8 5 (L) 12 0 - 17 0 g/dL    Hematocrit 25 5 (L) 36 5 - 49 3 %    MCV 94 82 - 98 fL    MCH 31 4 26 8 - 34 3 pg    MCHC 33 3 31 4 - 37 4 g/dL    RDW 16 6 (H) 11 6 - 15 1 %    MPV 10 0 8 9 - 12 7 fL    Platelets 271 793 - 134 Thousands/uL    nRBC 0 /100 WBCs    Neutrophils Relative 74 43 - 75 %    Immat GRANS % 1 0 - 2 %    Lymphocytes Relative 14 14 - 44 %    Monocytes Relative 7 4 - 12 %    Eosinophils Relative 3 0 - 6 %    Basophils Relative 1 0 - 1 %    Neutrophils Absolute 5 66 1 85 - 7 62 Thousands/µL    Immature Grans Absolute 0 09 0 00 - 0 20 Thousand/uL    Lymphocytes Absolute 1 07 0 60 - 4 47 Thousands/µL    Monocytes Absolute 0 52 0 17 - 1 22 Thousand/µL    Eosinophils Absolute 0 25 0 00 - 0 61 Thousand/µL    Basophils Absolute 0 04 0 00 - 0 10 Thousands/µL   Urine culture    Collection Time: 11/07/22 12:25 PM    Specimen: Urine   Result Value Ref Range    Urine Culture <10,000 cfu/ml     CBC and differential    Collection Time: 11/10/22 10:11 AM   Result Value Ref Range    WBC 5 79 4 31 - 10 16 Thousand/uL    RBC 2 74 (L) 3 88 - 5 62 Million/uL    Hemoglobin 8 5 (L) 12 0 - 17 0 g/dL    Hematocrit 26 0 (L) 36 5 - 49 3 %    MCV 95 82 - 98 fL    MCH 31 0 26 8 - 34 3 pg    MCHC 32 7 31 4 - 37 4 g/dL    RDW 18 6 (H) 11 6 - 15 1 %    MPV 10 0 8 9 - 12 7 fL    Platelets 864 894 - 549 Thousands/uL    nRBC 0 /100 WBCs    Neutrophils Relative 65 43 - 75 %    Immat GRANS % 1 0 - 2 %    Lymphocytes Relative 19 14 - 44 %    Monocytes Relative 13 (H) 4 - 12 %    Eosinophils Relative 1 0 - 6 %    Basophils Relative 1 0 - 1 %    Neutrophils Absolute 3 79 1 85 - 7 62 Thousands/µL    Immature Grans Absolute 0 04 0 00 - 0 20 Thousand/uL    Lymphocytes Absolute 1 08 0 60 - 4 47 Thousands/µL    Monocytes Absolute 0 77 0 17 - 1 22 Thousand/µL    Eosinophils Absolute 0 07 0 00 - 0 61 Thousand/µL    Basophils Absolute 0 04 0 00 - 0 10 Thousands/µL   Comprehensive metabolic panel    Collection Time: 11/10/22 10:11 AM   Result Value Ref Range    Sodium 136 135 - 147 mmol/L    Potassium 5 0 3 5 - 5 3 mmol/L    Chloride 104 96 - 108 mmol/L    CO2 22 21 - 32 mmol/L    ANION GAP 10 5 - 14 mmol/L    BUN 39 (H) 5 - 25 mg/dL    Creatinine 3 11 (H) 0 70 - 1 50 mg/dL    Glucose 187 (H) 70 - 99 mg/dL    Calcium 8 7 8 4 - 10 2 mg/dL    AST 50 17 - 59 U/L    ALT 24 <50 U/L    Alkaline Phosphatase 109 43 - 122 U/L    Total Protein 7 1 6 4 - 8 4 g/dL    Albumin 3 6 3 5 - 5 0 g/dL    Total Bilirubin 0 32 0 20 - 1 00 mg/dL    eGFR 18 ml/min/1 73sq m   Reticulocytes    Collection Time: 11/10/22 10:11 AM   Result Value Ref Range    Retic Ct Abs 101,700 14,356 - 105,094    Retic Ct Pct 3 71 (H) 0 37 - 1 87 %   Vitamin B12    Collection Time: 11/10/22 10:11 AM   Result Value Ref Range    Vitamin B-12 1,036 (H) 100 - 900 pg/mL   Folate    Collection Time: 11/10/22 10:11 AM   Result Value Ref Range    Folate >20 0 (H) 3 1 - 17 5 ng/mL   Iron Saturation %    Collection Time: 11/10/22 10:11 AM   Result Value Ref Range    Iron Saturation 19 (L) 20 - 50 %    TIBC 230 (L) 250 - 450 ug/dL    Iron 43 (L) 65 - 175 ug/dL   Ferritin    Collection Time: 11/10/22 10:11 AM   Result Value Ref Range    Ferritin 153 8 - 388 ng/mL       Imaging Studies:IR nephrostomy tube check/change/reposition/reinsertion/upsize    Result Date: 11/15/2022  Narrative: PROCEDURE: Bilateral PCN catheter exchange Procedural Personnel Attending physician(s): Dr Kaci Mercado Pre-procedure diagnosis: Hydronephrosis Post-procedure diagnosis: Same Indication: Patient with history of bladder cancer status post TURBT and chemoradiation therapy with bilateral hydronephrosis status post bilateral PCN returns for routine 3 month catheter exchange  PROCEDURE SUMMARY - Bilateral PCN catheter exchange under fluoroscopic guidance - Antegrade nephrostograms via the existing access PROCEDURE DETAILS: Pre-procedure Consent: Existing informed consent was utilized and time-out was performed prior to the procedure  Preparation: The site was prepared and draped using maximal sterile barrier technique including cutaneous antisepsis  Anesthesia/sedation Level of anesthesia/sedation: Local lidocaine Right PCN catheter exchange Local anesthesia was administered  Existing catheter was exchanged for a new 10 2 [place] Kar[place]ma catheter over a Bentson wire  Contrast was injected through the new catheter to confirm proper positioning and to perform an antegrade nephrostogram  Left PCN catheter exchange Local anesthesia was administered  Existing catheter was exchanged for a new 10 2 Western Karishma catheter over a Bentson wire   Contrast was injected through the new catheter to confirm proper positioning and to perform an antegrade nephrostogram  Contrast Contrast agent: Omnipaque Contrast volume (mL): 15 Radiation Dose Fluoroscopy time (minutes): 1 5  Reference air kerma (mGy): 40 Additional Details Estimated blood loss (mL): None Complications: No immediate complications  Impression: 1  Bilateral PCN exchange using 10 2 [place] catheters  2  Bilateral antegrade nephrostograms showed distal ureteral obstruction  Plan: Return in 3 months for routine catheter exchange  Workstation performed: Ira Pendleton MD  11/15/2022,4:34 PM    Portions of the record may have been created with voice recognition software  Occasional wrong word or "sound a like" substitutions may have occurred due to the inherent limitations of voice recognition software  Read the chart carefully and recognize, using context, where substitutions have occurred

## 2022-11-16 ENCOUNTER — APPOINTMENT (OUTPATIENT)
Dept: LAB | Facility: CLINIC | Age: 77
End: 2022-11-16

## 2022-11-16 DIAGNOSIS — N18.4 CKD (CHRONIC KIDNEY DISEASE) STAGE 4, GFR 15-29 ML/MIN (HCC): ICD-10-CM

## 2022-11-16 DIAGNOSIS — N18.30 TYPE 2 DIABETES MELLITUS WITH STAGE 3 CHRONIC KIDNEY DISEASE, WITH LONG-TERM CURRENT USE OF INSULIN, UNSPECIFIED WHETHER STAGE 3A OR 3B CKD (HCC): ICD-10-CM

## 2022-11-16 DIAGNOSIS — R31.0 GROSS HEMATURIA: ICD-10-CM

## 2022-11-16 DIAGNOSIS — Z79.4 TYPE 2 DIABETES MELLITUS WITH STAGE 3 CHRONIC KIDNEY DISEASE, WITH LONG-TERM CURRENT USE OF INSULIN, UNSPECIFIED WHETHER STAGE 3A OR 3B CKD (HCC): ICD-10-CM

## 2022-11-16 DIAGNOSIS — I70.209 ARTERIOSCLEROSIS OF ARTERY OF EXTREMITY (HCC): ICD-10-CM

## 2022-11-16 DIAGNOSIS — C67.3 MALIGNANT NEOPLASM OF ANTERIOR WALL OF URINARY BLADDER (HCC): ICD-10-CM

## 2022-11-16 DIAGNOSIS — I25.118 CORONARY ARTERY DISEASE OF NATIVE ARTERY OF NATIVE HEART WITH STABLE ANGINA PECTORIS (HCC): ICD-10-CM

## 2022-11-16 DIAGNOSIS — E11.22 TYPE 2 DIABETES MELLITUS WITH STAGE 3 CHRONIC KIDNEY DISEASE, WITH LONG-TERM CURRENT USE OF INSULIN, UNSPECIFIED WHETHER STAGE 3A OR 3B CKD (HCC): ICD-10-CM

## 2022-11-16 DIAGNOSIS — D64.9 ANEMIA, UNSPECIFIED TYPE: ICD-10-CM

## 2022-11-16 DIAGNOSIS — R31.0 GROSS HEMATURIA: Primary | ICD-10-CM

## 2022-11-16 LAB
BASOPHILS # BLD AUTO: 0.06 THOUSANDS/ÂΜL (ref 0–0.1)
BASOPHILS NFR BLD AUTO: 1 % (ref 0–1)
EOSINOPHIL # BLD AUTO: 0.11 THOUSAND/ÂΜL (ref 0–0.61)
EOSINOPHIL NFR BLD AUTO: 1 % (ref 0–6)
ERYTHROCYTE [DISTWIDTH] IN BLOOD BY AUTOMATED COUNT: 18.4 % (ref 11.6–15.1)
HCT VFR BLD AUTO: 25.6 % (ref 36.5–49.3)
HGB BLD-MCNC: 7.9 G/DL (ref 12–17)
IMM GRANULOCYTES # BLD AUTO: 0.17 THOUSAND/UL (ref 0–0.2)
IMM GRANULOCYTES NFR BLD AUTO: 2 % (ref 0–2)
LYMPHOCYTES # BLD AUTO: 1.66 THOUSANDS/ÂΜL (ref 0.6–4.47)
LYMPHOCYTES NFR BLD AUTO: 18 % (ref 14–44)
MCH RBC QN AUTO: 30.4 PG (ref 26.8–34.3)
MCHC RBC AUTO-ENTMCNC: 30.9 G/DL (ref 31.4–37.4)
MCV RBC AUTO: 99 FL (ref 82–98)
MONOCYTES # BLD AUTO: 1.18 THOUSAND/ÂΜL (ref 0.17–1.22)
MONOCYTES NFR BLD AUTO: 13 % (ref 4–12)
NEUTROPHILS # BLD AUTO: 6.26 THOUSANDS/ÂΜL (ref 1.85–7.62)
NEUTS SEG NFR BLD AUTO: 65 % (ref 43–75)
NRBC BLD AUTO-RTO: 0 /100 WBCS
PLATELET # BLD AUTO: 302 THOUSANDS/UL (ref 149–390)
PMV BLD AUTO: 10.6 FL (ref 8.9–12.7)
RBC # BLD AUTO: 2.6 MILLION/UL (ref 3.88–5.62)
WBC # BLD AUTO: 9.44 THOUSAND/UL (ref 4.31–10.16)

## 2022-11-16 RX ORDER — TAMSULOSIN HYDROCHLORIDE 0.4 MG/1
0.4 CAPSULE ORAL
Qty: 90 CAPSULE | Refills: 3 | Status: SHIPPED | OUTPATIENT
Start: 2022-11-16

## 2022-11-16 NOTE — TELEPHONE ENCOUNTER
Spoke to pt's granddaughter and advised per Dr Jose Weinberg to bring pt for CBC and platelet lab  Upon review of results a decision will be made if pt needs to be brought to hospital to possibly be cauterized to stop bleeding      Granddaughter is also asking for a refill of pt's tamsulosin RX

## 2022-11-16 NOTE — TELEPHONE ENCOUNTER
Patient's granddaughter called, she is still awaiting a call back   She would like a call back at [phone]

## 2022-11-17 ENCOUNTER — APPOINTMENT (EMERGENCY)
Dept: RADIOLOGY | Facility: HOSPITAL | Age: 77
End: 2022-11-17

## 2022-11-17 ENCOUNTER — TELEPHONE (OUTPATIENT)
Dept: INTERNAL MEDICINE CLINIC | Facility: CLINIC | Age: 77
End: 2022-11-17

## 2022-11-17 ENCOUNTER — APPOINTMENT (EMERGENCY)
Dept: CT IMAGING | Facility: HOSPITAL | Age: 77
End: 2022-11-17

## 2022-11-17 ENCOUNTER — HOSPITAL ENCOUNTER (INPATIENT)
Facility: HOSPITAL | Age: 77
LOS: 2 days | Discharge: HOME | End: 2022-11-19
Attending: EMERGENCY MEDICINE | Admitting: INTERNAL MEDICINE

## 2022-11-17 ENCOUNTER — HOSPITAL ENCOUNTER (OUTPATIENT)
Dept: INFUSION CENTER | Facility: HOSPITAL | Age: 77
Discharge: HOME | End: 2022-11-17

## 2022-11-17 ENCOUNTER — TELEPHONE (OUTPATIENT)
Dept: HEMATOLOGY ONCOLOGY | Facility: CLINIC | Age: 77
End: 2022-11-17

## 2022-11-17 ENCOUNTER — TELEPHONE (OUTPATIENT)
Dept: UROLOGY | Facility: MEDICAL CENTER | Age: 77
End: 2022-11-17

## 2022-11-17 VITALS
SYSTOLIC BLOOD PRESSURE: 126 MMHG | TEMPERATURE: 98.6 F | DIASTOLIC BLOOD PRESSURE: 82 MMHG | RESPIRATION RATE: 20 BRPM | OXYGEN SATURATION: 98 % | HEART RATE: 87 BPM

## 2022-11-17 DIAGNOSIS — B02.9 HERPES ZOSTER WITHOUT COMPLICATION: ICD-10-CM

## 2022-11-17 DIAGNOSIS — N39.0 URINARY TRACT INFECTION WITH HEMATURIA, SITE UNSPECIFIED: ICD-10-CM

## 2022-11-17 DIAGNOSIS — J90 PLEURAL EFFUSION ON LEFT: ICD-10-CM

## 2022-11-17 DIAGNOSIS — D64.9 ANEMIA: Primary | ICD-10-CM

## 2022-11-17 DIAGNOSIS — R31.9 URINARY TRACT INFECTION WITH HEMATURIA, SITE UNSPECIFIED: ICD-10-CM

## 2022-11-17 DIAGNOSIS — J30.2 SEASONAL ALLERGIES: ICD-10-CM

## 2022-11-17 DIAGNOSIS — N18.9 CKD (CHRONIC KIDNEY DISEASE): ICD-10-CM

## 2022-11-17 DIAGNOSIS — C67.3 MALIGNANT NEOPLASM OF ANTERIOR WALL OF URINARY BLADDER (HCC): Primary | ICD-10-CM

## 2022-11-17 DIAGNOSIS — C78.2 PLEURAL METASTASIS (HCC): ICD-10-CM

## 2022-11-17 DIAGNOSIS — R31.9 HEMATURIA: ICD-10-CM

## 2022-11-17 LAB
2HR DELTA HS TROPONIN: 2 NG/L
4HR DELTA HS TROPONIN: 1 NG/L
ABO GROUP BLD: NORMAL
ANION GAP SERPL CALCULATED.3IONS-SCNC: 11 MMOL/L (ref 4–13)
APTT PPP: 31 SECONDS (ref 23–37)
ATRIAL RATE: 80 BPM
BACTERIA UR QL AUTO: ABNORMAL /HPF
BACTERIA UR QL AUTO: ABNORMAL /HPF
BASOPHILS # BLD AUTO: 0.06 THOUSANDS/ÂΜL (ref 0–0.1)
BASOPHILS NFR BLD AUTO: 1 % (ref 0–1)
BILIRUB UR QL STRIP: ABNORMAL
BILIRUB UR QL STRIP: NEGATIVE
BLD GP AB SCN SERPL QL: NEGATIVE
BUN SERPL-MCNC: 51 MG/DL (ref 5–25)
CALCIUM SERPL-MCNC: 8.3 MG/DL (ref 8.3–10.1)
CARDIAC TROPONIN I PNL SERPL HS: 12 NG/L
CARDIAC TROPONIN I PNL SERPL HS: 13 NG/L
CARDIAC TROPONIN I PNL SERPL HS: 14 NG/L
CHLORIDE SERPL-SCNC: 103 MMOL/L (ref 96–108)
CLARITY UR: ABNORMAL
CLARITY UR: ABNORMAL
CO2 SERPL-SCNC: 23 MMOL/L (ref 21–32)
COLOR UR: ABNORMAL
COLOR UR: ABNORMAL
CREAT SERPL-MCNC: 3.58 MG/DL (ref 0.6–1.3)
EOSINOPHIL # BLD AUTO: 0.07 THOUSAND/ÂΜL (ref 0–0.61)
EOSINOPHIL NFR BLD AUTO: 1 % (ref 0–6)
ERYTHROCYTE [DISTWIDTH] IN BLOOD BY AUTOMATED COUNT: 18.4 % (ref 11.6–15.1)
GFR SERPL CREATININE-BSD FRML MDRD: 15 ML/MIN/1.73SQ M
GLUCOSE SERPL-MCNC: 211 MG/DL (ref 65–140)
GLUCOSE SERPL-MCNC: 216 MG/DL (ref 65–140)
GLUCOSE UR STRIP-MCNC: NEGATIVE MG/DL
GLUCOSE UR STRIP-MCNC: NEGATIVE MG/DL
HCT VFR BLD AUTO: 22.3 % (ref 36.5–49.3)
HGB BLD-MCNC: 7.3 G/DL (ref 12–17)
HGB UR QL STRIP.AUTO: ABNORMAL
HGB UR QL STRIP.AUTO: ABNORMAL
IMM GRANULOCYTES # BLD AUTO: 0.09 THOUSAND/UL (ref 0–0.2)
IMM GRANULOCYTES NFR BLD AUTO: 1 % (ref 0–2)
INR PPP: 1.03 (ref 0.84–1.19)
KETONES UR STRIP-MCNC: NEGATIVE MG/DL
KETONES UR STRIP-MCNC: NEGATIVE MG/DL
LEUKOCYTE ESTERASE UR QL STRIP: ABNORMAL
LEUKOCYTE ESTERASE UR QL STRIP: ABNORMAL
LYMPHOCYTES # BLD AUTO: 1.17 THOUSANDS/ÂΜL (ref 0.6–4.47)
LYMPHOCYTES NFR BLD AUTO: 13 % (ref 14–44)
MCH RBC QN AUTO: 32 PG (ref 26.8–34.3)
MCHC RBC AUTO-ENTMCNC: 32.7 G/DL (ref 31.4–37.4)
MCV RBC AUTO: 98 FL (ref 82–98)
MONOCYTES # BLD AUTO: 0.83 THOUSAND/ÂΜL (ref 0.17–1.22)
MONOCYTES NFR BLD AUTO: 9 % (ref 4–12)
NEUTROPHILS # BLD AUTO: 6.91 THOUSANDS/ÂΜL (ref 1.85–7.62)
NEUTS SEG NFR BLD AUTO: 75 % (ref 43–75)
NITRITE UR QL STRIP: NEGATIVE
NITRITE UR QL STRIP: POSITIVE
NON-SQ EPI CELLS URNS QL MICRO: ABNORMAL /HPF
NON-SQ EPI CELLS URNS QL MICRO: ABNORMAL /HPF
NRBC BLD AUTO-RTO: 0 /100 WBCS
OTHER STN SPEC: ABNORMAL
P AXIS: 56 DEGREES
PH UR STRIP.AUTO: 5.5 [PH]
PH UR STRIP.AUTO: 5.5 [PH]
PLATELET # BLD AUTO: 256 THOUSANDS/UL (ref 149–390)
PMV BLD AUTO: 10.1 FL (ref 8.9–12.7)
POTASSIUM SERPL-SCNC: 5 MMOL/L (ref 3.5–5.3)
POTASSIUM SERPL-SCNC: 5.6 MMOL/L (ref 3.5–5.3)
PR INTERVAL: 190 MS
PROT UR STRIP-MCNC: ABNORMAL MG/DL
PROT UR STRIP-MCNC: ABNORMAL MG/DL
PROTHROMBIN TIME: 13.5 SECONDS (ref 11.6–14.5)
QRS AXIS: 13 DEGREES
QRSD INTERVAL: 104 MS
QT INTERVAL: 360 MS
QTC INTERVAL: 415 MS
RBC # BLD AUTO: 2.28 MILLION/UL (ref 3.88–5.62)
RBC #/AREA URNS AUTO: ABNORMAL /HPF
RBC #/AREA URNS AUTO: ABNORMAL /HPF
RH BLD: POSITIVE
SODIUM SERPL-SCNC: 137 MMOL/L (ref 135–147)
SP GR UR STRIP.AUTO: 1.02 (ref 1–1.03)
SP GR UR STRIP.AUTO: 1.02 (ref 1–1.03)
SPECIMEN EXPIRATION DATE: NORMAL
T WAVE AXIS: 56 DEGREES
UROBILINOGEN UR QL STRIP.AUTO: 0.2 E.U./DL
UROBILINOGEN UR QL STRIP.AUTO: 1 E.U./DL
VENTRICULAR RATE: 80 BPM
WBC # BLD AUTO: 9.13 THOUSAND/UL (ref 4.31–10.16)
WBC #/AREA URNS AUTO: ABNORMAL /HPF
WBC #/AREA URNS AUTO: ABNORMAL /HPF

## 2022-11-17 PROCEDURE — 30233N1 TRANSFUSION OF NONAUTOLOGOUS RED BLOOD CELLS INTO PERIPHERAL VEIN, PERCUTANEOUS APPROACH: ICD-10-PCS | Performed by: EMERGENCY MEDICINE

## 2022-11-17 RX ORDER — OXYCODONE HYDROCHLORIDE 5 MG/1
5 TABLET ORAL EVERY 4 HOURS PRN
Status: DISCONTINUED | OUTPATIENT
Start: 2022-11-17 | End: 2022-11-19 | Stop reason: HOSPADM

## 2022-11-17 RX ORDER — GABAPENTIN 300 MG/1
300 CAPSULE ORAL
Status: DISCONTINUED | OUTPATIENT
Start: 2022-11-17 | End: 2022-11-19 | Stop reason: HOSPADM

## 2022-11-17 RX ORDER — INSULIN LISPRO 100 [IU]/ML
1-5 INJECTION, SOLUTION INTRAVENOUS; SUBCUTANEOUS
Status: DISCONTINUED | OUTPATIENT
Start: 2022-11-17 | End: 2022-11-19 | Stop reason: HOSPADM

## 2022-11-17 RX ORDER — PHENAZOPYRIDINE HYDROCHLORIDE 100 MG/1
200 TABLET, FILM COATED ORAL
Status: DISCONTINUED | OUTPATIENT
Start: 2022-11-17 | End: 2022-11-19 | Stop reason: HOSPADM

## 2022-11-17 RX ORDER — DULOXETIN HYDROCHLORIDE 30 MG/1
30 CAPSULE, DELAYED RELEASE ORAL DAILY
Status: DISCONTINUED | OUTPATIENT
Start: 2022-11-18 | End: 2022-11-19 | Stop reason: HOSPADM

## 2022-11-17 RX ORDER — TAMSULOSIN HYDROCHLORIDE 0.4 MG/1
0.4 CAPSULE ORAL
Status: DISCONTINUED | OUTPATIENT
Start: 2022-11-17 | End: 2022-11-19 | Stop reason: HOSPADM

## 2022-11-17 RX ORDER — EZETIMIBE 10 MG/1
10 TABLET ORAL DAILY
Status: DISCONTINUED | OUTPATIENT
Start: 2022-11-18 | End: 2022-11-19 | Stop reason: HOSPADM

## 2022-11-17 RX ORDER — ALPRAZOLAM 0.5 MG/1
0.5 TABLET ORAL
Status: DISCONTINUED | OUTPATIENT
Start: 2022-11-17 | End: 2022-11-19 | Stop reason: HOSPADM

## 2022-11-17 RX ORDER — SENNOSIDES 8.6 MG
2 TABLET ORAL 2 TIMES DAILY
Status: DISCONTINUED | OUTPATIENT
Start: 2022-11-17 | End: 2022-11-19 | Stop reason: HOSPADM

## 2022-11-17 RX ORDER — PANTOPRAZOLE SODIUM 40 MG/1
40 TABLET, DELAYED RELEASE ORAL
Status: DISCONTINUED | OUTPATIENT
Start: 2022-11-18 | End: 2022-11-19 | Stop reason: HOSPADM

## 2022-11-17 RX ORDER — CALCITRIOL 0.25 UG/1
0.25 CAPSULE, LIQUID FILLED ORAL DAILY
Status: DISCONTINUED | OUTPATIENT
Start: 2022-11-18 | End: 2022-11-19 | Stop reason: HOSPADM

## 2022-11-17 RX ORDER — PREDNISONE 20 MG/1
20 TABLET ORAL DAILY
Status: DISCONTINUED | OUTPATIENT
Start: 2022-11-18 | End: 2022-11-19 | Stop reason: HOSPADM

## 2022-11-17 RX ORDER — METOPROLOL SUCCINATE 50 MG/1
100 TABLET, EXTENDED RELEASE ORAL DAILY
Status: DISCONTINUED | OUTPATIENT
Start: 2022-11-18 | End: 2022-11-19 | Stop reason: HOSPADM

## 2022-11-17 RX ORDER — ACETAMINOPHEN 325 MG/1
650 TABLET ORAL EVERY 6 HOURS PRN
Status: DISCONTINUED | OUTPATIENT
Start: 2022-11-17 | End: 2022-11-17 | Stop reason: SDUPTHER

## 2022-11-17 RX ORDER — INSULIN GLARGINE 100 [IU]/ML
12 INJECTION, SOLUTION SUBCUTANEOUS
Status: DISCONTINUED | OUTPATIENT
Start: 2022-11-17 | End: 2022-11-18

## 2022-11-17 RX ORDER — ARIPIPRAZOLE 2 MG/1
2 TABLET ORAL DAILY
Status: DISCONTINUED | OUTPATIENT
Start: 2022-11-18 | End: 2022-11-19 | Stop reason: HOSPADM

## 2022-11-17 RX ORDER — OXYCODONE HCL 10 MG/1
10 TABLET, FILM COATED, EXTENDED RELEASE ORAL EVERY 12 HOURS SCHEDULED
Status: DISCONTINUED | OUTPATIENT
Start: 2022-11-17 | End: 2022-11-19 | Stop reason: HOSPADM

## 2022-11-17 RX ORDER — ACETAMINOPHEN 325 MG/1
650 TABLET ORAL EVERY 6 HOURS PRN
Status: DISCONTINUED | OUTPATIENT
Start: 2022-11-17 | End: 2022-11-19 | Stop reason: HOSPADM

## 2022-11-17 RX ORDER — ISOSORBIDE MONONITRATE 30 MG/1
30 TABLET, EXTENDED RELEASE ORAL EVERY MORNING
Status: DISCONTINUED | OUTPATIENT
Start: 2022-11-18 | End: 2022-11-19 | Stop reason: HOSPADM

## 2022-11-17 RX ADMIN — GABAPENTIN 300 MG: 300 CAPSULE ORAL at 22:08

## 2022-11-17 RX ADMIN — OXYCODONE HYDROCHLORIDE 10 MG: 10 TABLET, FILM COATED, EXTENDED RELEASE ORAL at 22:08

## 2022-11-17 RX ADMIN — CEFTRIAXONE 1000 MG: 1 INJECTION, POWDER, FOR SOLUTION INTRAMUSCULAR; INTRAVENOUS at 21:49

## 2022-11-17 RX ADMIN — ALPRAZOLAM 0.5 MG: 0.5 TABLET ORAL at 22:47

## 2022-11-17 RX ADMIN — BIMATOPROST 1 DROP: 0.1 SOLUTION/ DROPS OPHTHALMIC at 21:49

## 2022-11-17 RX ADMIN — TAMSULOSIN HYDROCHLORIDE 0.4 MG: 0.4 CAPSULE ORAL at 19:05

## 2022-11-17 RX ADMIN — SENNOSIDES 17.2 MG: 8.6 TABLET, FILM COATED ORAL at 19:05

## 2022-11-17 RX ADMIN — PHENAZOPYRIDINE HYDROCHLORIDE 200 MG: 100 TABLET ORAL at 19:05

## 2022-11-17 RX ADMIN — INSULIN GLARGINE 12 UNITS: 100 INJECTION, SOLUTION SUBCUTANEOUS at 22:08

## 2022-11-17 NOTE — H&P
[facility]  H&P- Elizabeth Washington 1945, 76 y o  male MRN: [id number]  Unit/Bed#: [facility] 68 2 -01 Encounter: [id number]  Primary Care Provider: Trina Cruz MD   Date and time admitted to hospital: 11/17/2022  1:02 PM    * Hematuria  Assessment & Plan  This is a 68-year-old male with history of bladder cancer, bilateral nephrostomy tubes in place, CAD, CKD stage 4, diabetes mellitus, hypertension, hyperlipidemia presenting with hematuria for the past few days  Patient recently had exchange of nephrostomy tubes on 11/15/2022  Patient noted having hematuria from his right nephrostomy and also bloody discharge from his penile urethra  Complains also of dysuria  Denies any nausea, vomiting, fever, chills      · Hemoglobin 7 3, 1 unit PRBC ordered by ED physician  · CT scan revealed stable eccentric bladder wall thickening on the left involving left ureterovesicular junction compatible with known neoplasm, no hydronephrosis with nephrostomy tube catheter is in place, improved metastatic retroperitoneal lymphadenopathy, stable pleural metastasis with increased left pleural effusion and new and lymphadenopathy and adjacent epicardial fat  · Urology consulted by ED  · Recommending to flush PCNs every shift  · Monitor H&H closely transfuse further if needed  · Etiology stating if patient becomes unstable would need IR evaluation    UTI (urinary tract infection)  Assessment & Plan  · Patient was recently treated outpatient for UTI with nitrofurantoin and Bactrim without improvement  · Urinalysis consistent with UTI  · Will start ceftriaxone follow-up cultures    Recurrent pleural effusion  Assessment & Plan  · Patient has left tunneled pleural catheter according to him and his wife has been draining this more frequently  · Follows pulmonary outpatient    Continuous opioid dependence ([facility] [address] )  Assessment & Plan  · Maintained on OxyContin 10 mg q 12 hours and oxycodone 5 mg q 4 hours as needed    Acute kidney injury superimposed on CKD [facility])  Assessment & Plan  Lab Results   Component Value Date    EGFR 15 11/17/2022    EGFR 18 11/10/2022    EGFR 19 10/20/2022    CREATININE 3 58 (H) 11/17/2022    CREATININE 3 11 (H) 11/10/2022    CREATININE 2 93 (H) 10/20/2022   · Acute kidney injury on CKD stage 4  · Likely secondary to anemia due to hematuria  · Baseline creatinine high 2s low 3s  · Plan for blood transfusion  · Hold diuretics  · Monitor renal function closely    Type 2 diabetes mellitus, with long-term current use of insulin (HCC)  Assessment & Plan  Lab Results   Component Value Date    HGBA1C 7 2 (H) 10/20/2022       No results for input(s): POCGLU in the last 72 hours  Blood Sugar Average: Last 72 hrs:  ·  will restart Lantus at a lower dose of 12 units HS  · Monitor Accu-Cheks, sliding scale for coverage    Hyperlipidemia  Assessment & Plan  · Continue ezetimibe    Hypertension with renal disease  Assessment & Plan  · Continue metoprolol, will hold furosemide in setting of acute kidney injury    Bladder carcinoma (HCC)  Assessment & Plan  · Status post TURBT and BCG, bilateral nephrostomy tubes  · History of bladder cancer follows Oncology outpatient    Coronary artery disease of native artery of native heart with stable angina pectoris (HCC)  Assessment & Plan  · Stable, denies any chest pain  · Hold aspirin in setting of hematuria  · Continue metoprolol and isosorbide        VTE Prophylaxis: Contraindicated due tohematuria  / sequential compression device   Code Status: Full  POLST: There is no POLST form on file for this patient (pre-hospital)    Anticipated Length of Stay:  Patient will be admitted on an Inpatient basis with an anticipated length of stay of  Greater than 2 midnights  Justification for Hospital Stay: hematuria    Total Time for Visit, including Counseling / Coordination of Care: 45 minutes    Greater than 50% of this total time spent on direct patient counseling and coordination of care  Chief Complaint:   Blood in right nephrostomy tube    History of Present Illness:    Demian Albright is a 76 y o  male who presents with bloody drainage from nephrostomy tube  Patient has history of bladder cancer, bilateral nephrostomy tubes in place, CAD, CKD stage 4, diabetes mellitus, hypertension, hyperlipidemia presenting with hematuria for the past few days  Patient recently had exchange of nephrostomy tubes on 11/15/2022  Patient noted having hematuria from his right nephrostomy and also bloody discharge from his penile urethra  Complains also of dysuria  Denies any nausea, vomiting, fever, chills  Review of Systems:    Review of Systems   Constitutional: Positive for appetite change  HENT: Negative  Eyes: Negative  Respiratory: Negative  Cardiovascular: Negative  Gastrointestinal: Negative  Endocrine: Negative  Genitourinary: Positive for hematuria  Musculoskeletal: Negative  Skin: Negative  Allergic/Immunologic: Negative  Neurological: Negative  Hematological: Negative  Psychiatric/Behavioral: Negative          Past Medical and Surgical History:     Past Medical History:   Diagnosis Date   • Anemia     Last assessed: 9/28/17   • Anxiety    • Arteriosclerotic cardiovascular disease     Last assessed: 9/28/17   • Arthritis    • Bladder cancer ([facility] )     bladder- had BCG treatments   • Chronic kidney disease     Stage IV   • CKD (chronic kidney disease) stage 4, GFR 15-29 ml/min ([facility])    • Colon polyp    • Coronary artery disease     7 stents   • Depression    • Diabetes mellitus (HCC)     IDDM   • GERD (gastroesophageal reflux disease)    • Glaucoma    • Hematuria    • History of fusion of cervical spine    • Hyperlipidemia    • Hypertension    • Insomnia     Last assessed: 11/14/12   • Loss of hearing     has hearing aids but usually does not wear them   • Lung cancer [facility])    • Metastatic cancer ([facility] )    • Other seasonal allergic rhinitis Last assessed: 2/10/16   • PAD (peripheral artery disease) ([facility])    • Shortness of breath     on exertion   • Spinal stenosis of lumbar region    • Transient cerebral ischemia     No Residual   • Uses walker     w/c for longer distances       Past Surgical History:   Procedure Laterality Date   • CARDIAC SURGERY      Cath stent placement  Last assessed: 3/9/17  Interventional Catheterization   • CHOLECYSTECTOMY     • COLONOSCOPY     • CYSTOSCOPY      Diagnostic w/biopsy  Tereso Bennett  Last assessed: 12/1/14   • CYSTOSCOPY N/A 04/12/2022    Procedure: CYSTOSCOPY  Bladder biopsies  ;  Surgeon: Patricio Herrera MD;  [facility];  Service: Urology   • CYSTOSCOPY W/ RETROGRADES Right 03/01/2022    Procedure: CYSTO; stent removal retrograde;  Surgeon: Patricio Herrera MD;  [facility];  Service: Urology   • CYSTOSCOPY W/ URETERAL STENT PLACEMENT Bilateral 10/18/2021    Procedure: bilateral retrogrades, cytology collection;  Surgeon: Patricio Herrera MD;  [facility];  Service: Urology   • CYSTOURETHROSCOPY      w/cautery  Tereso Bennett   • FL RETROGRADE PYELOGRAM  10/18/2021   • FL RETROGRADE PYELOGRAM  10/24/2021   • GASTRIC BYPASS      For morbid obesity w/Shaji-en-Y   Resolved: 11/17/09   • INCISION AND DRAINAGE OF WOUND Right 02/26/2017    Procedure: INCISION AND DRAINAGE (I&D) EXTREMITY WITH APPLICATION OF GRAFT JACKET;  Surgeon: Cintia Fierro DPM;  [facility];  Service:    • INCISION AND DRAINAGE OF WOUND Right 04/25/2017    Procedure: INCISION AND DRAINAGE (I&D) EXTREMITY, APPLICATION OF GRAFT;  Surgeon: Cintia Fierro DPM;  [facility];  Service:    • IR BIOPSY OTHER  07/02/2020   • IR LOWER EXTREMITY ANGIOGRAM  02/08/2021   • IR LOWER EXTREMITY ANGIOGRAM  02/11/2021   • IR NEPHROSTOMY TUBE CHECK/CHANGE/REPOSITION/REINSERTION/UPSIZE  04/28/2022   • IR NEPHROSTOMY TUBE CHECK/CHANGE/REPOSITION/REINSERTION/UPSIZE  05/24/2022   • IR NEPHROSTOMY TUBE CHECK/CHANGE/REPOSITION/REINSERTION/UPSIZE  06/07/2022   • IR NEPHROSTOMY TUBE CHECK/CHANGE/REPOSITION/REINSERTION/UPSIZE  07/28/2022   • IR NEPHROSTOMY TUBE CHECK/CHANGE/REPOSITION/REINSERTION/UPSIZE  11/15/2022   • IR NEPHROSTOMY TUBE PLACEMENT  02/25/2022   • IR PORT PLACEMENT  01/17/2022   • IR THORACENTESIS  09/02/2022   • IR THORACENTESIS  09/14/2022   • IR THORACENTESIS WITH TUBE PLACEMENT Left     october   • IR TUNNELED CENTRAL LINE PLACEMENT  12/24/2020   • JOINT REPLACEMENT      christofer knees replaced   • AZ AMPUTATION METATARSAL+TOE,SINGLE Left 12/21/2020    Procedure: RAY RESECTION FOOT;  Surgeon: Orlie Schilder, DPM;  [facility];  Service: Podiatry   • AZ AMPUTATION METATARSAL+TOE,SINGLE Left 12/31/2020    Procedure: 5TH MET RESECTION;  Surgeon: Orlie Schilder, DPM;  [facility];  Service: Podiatry   • AZ CYSTOURETHROSCOPY W/IRRIG & EVAC CLOTS N/A 02/10/2021    Procedure: CYSTOSCOPY EVACUATION OF CLOTS, fulguration;  Surgeon: Stephanie Blanco MD;  [facility];  Service: Urology   • AZ CYSTOURETHROSCOPY W/IRRIG & EVAC CLOTS N/A 10/24/2021    Procedure: CYSTOSCOPY EVACUATION OF CLOT, fulguration of bleeding vessels, right ureter stent placement, retrograde pyelogram;  Surgeon: Jennifer Heard MD;  [facility];  Service: Urology   • AZ CYSTOURETHROSCOPY,BIOPSY N/A 08/16/2016    Procedure: CYSTOSCOPY WITH BIOPSIES;  Surgeon: Drea Turcios MD;  [facility];  Service: Urology   • AZ CYSTOURETHROSCOPY,FULGUR <0 5 CM LESN N/A 11/19/2020    Procedure: CYSTO W/BIOPSIES, transurethral prostate bx;  Surgeon: Noe Garibay MD;  [facility];  Service: Urology   • AZ CYSTOURETHROSCOPY,FULGUR >5 CM LESN Bilateral 10/18/2021    Procedure: TRANSURETHRAL RESECTION OF BLADDER TUMOR (TURBT);   Surgeon: Shelia Damon MD;  [facility];  Service: Urology   • AZ Annette Huitron 3RD+ ORD [address] PEL/LXTR [facility] Left 02/08/2021    Procedure: LEG angiogram, CO2 w/limited contrast with balloon angioplasty postertior tibial artery;  Surgeon: Suman Alston MD;  [facility];  Service: Vascular   • ROTATOR CUFF REPAIR     • SMALL INTESTINE SURGERY      Surgery Shaji-en-Y   • SPINAL FUSION      lumbar and cervical fusions   • VAC DRESSING APPLICATION Right 18/82/0363    Procedure: APPLICATION VAC DRESSING;  Surgeon: Bozena Bell DPM;  [facility];  Service:    • WOUND DEBRIDEMENT Left 02/16/2021    Procedure: FOOT DEBRIDE, [address] OUT w/graft application;  Surgeon: Bozena Bell DPM;  [facility];  Service: Podiatry       Meds/Allergies:    Prior to Admission medications    Medication Sig Start Date End Date Taking?  Authorizing Provider   Accu-Chek Softclix Lancets lancets Test blood sugar 4 times daily 2/23/22   Adams Boxer, MD   acetaminophen (TYLENOL) 325 mg tablet Take 650 mg by mouth every 6 (six) hours as needed for mild pain      Historical Provider, MD   ALPRAZolam Bell FinchMena) 0 25 mg tablet Take 2 tablets (0 5 mg total) by mouth daily at bedtime as needed for anxiety 10/11/22   April EVANGELINA Miller   ARIPiprazole (ABILIFY) 2 mg tablet Take 1 tablet (2 mg total) by mouth daily 6/10/22 10/31/22  April EVANGELINA Miller   aspirin (ECOTRIN LOW STRENGTH) 81 mg EC tablet Take 1 tablet (81 mg total) by mouth daily 11/1/21   Adams Boxer, MD   Azelastine HCl 0 15 % SOLN Inhale 1 spray 2 (two) times a day 5/14/20   Adams Boxer, MD   Bimatoprost (LUMIGAN OP) Apply 1 drop to eye daily at bedtime     Historical Provider, MD   calcitriol (ROCALTROL) 0 25 mcg capsule Take 1 capsule (0 25 mcg total) by mouth daily 2/2/22   Adams Boxer, MD   cetirizine (ZyrTEC) 10 MG chewable tablet Chew 10 mg daily    Historical Provider, MD   DULoxetine (CYMBALTA) 30 mg delayed release capsule TAKE ONE CAPSULE BY MOUTH EVERY DAY 10/10/22   Adams Boxer, MD   ezetimibe (ZETIA) 10 mg tablet Take 1 tablet (10 mg total) by mouth daily 10/31/22   Lexie Herrera Jhoan Castro MD   Ferrous Sulfate Dried (Feosol) 200 (65 Fe) MG TABS Take 65 mg by mouth daily 2/2/22   Angela Baker MD   fluocinonide (LIDEX) 0 05 % cream Apply topically 2 (two) times a day  Patient taking differently: Apply topically as needed 5/18/21   Angela Baker MD   fluticasone [facility]) 50 mcg/act nasal spray 1 spray into each nostril daily  Patient taking differently: 1 spray into each nostril as needed 9/29/21   Yeni Grover MD   furosemide (LASIX) 20 mg tablet Take 1 tablet (20 mg total) by mouth daily 10/31/22   Angela Baker MD   gabapentin (NEURONTIN) 300 mg capsule TAKE ONE CAPSULE BY MOUTH EVERY DAY AT BEDTIME 10/10/22   Angela Baker MD   glucose blood (Accu-Chek Martha Plus) test strip Test blood sugar 4 times daily 2/23/22   Angela Baker MD   hydrocortisone 2 5 % cream Apply topically 2 (two) times a day as needed for irritation or rash 9/29/22   Ana Montesinos MD   Insulin Pen Needle 31G X 8 MM MISC Inject 3 times a day 5/8/19   Angela Baker MD   isosorbide mononitrate (IMDUR) 30 mg 24 hr tablet TAKE ONE TABLET BY MOUTH EVERY DAY IN THE MORNING 10/3/22   Angela Baker MD   Lantus SoloStar 100 units/mL injection pen 18 units qhs  Patient taking differently: Inject 18 Units under the skin daily at bedtime 6/24/21   Priscilla Curry PA-C   lidocaine (XYLOCAINE) 2 % topical gel Apply topically as needed for mild pain 2/17/22   Penny Roth MD   lidocaine-prilocaine (EMLA) cream Apply topically as needed for mild pain 1/10/22   Ana Montesinos MD   loperamide (IMODIUM) 2 mg capsule Take 2 mg by mouth 4 (four) times a day as needed for diarrhea    Historical Provider, MD   metoprolol succinate (TOPROL-XL) 100 mg 24 hr tablet TAKE ONE TABLET BY MOUTH EVERY DAY 11/7/22   Angela Baker MD   multivitamin [facility]) TABS Take 1 tablet by mouth daily      Historical Provider, MD   naloxone (NARCAN) 4 mg/0 1 mL nasal spray Administer 1 spray into a nostril  If no response after 2-3 minutes, give another dose in the other nostril using a new spray   9/1/21   Bladimir Norwood MD   nitrofurantoin (MACROBID) 100 mg capsule Take 1 capsule (100 mg total) by mouth 2 (two) times a day  Patient not taking: Reported on 11/15/2022 5/31/22   EVANGELINA Grant   NovoLOG FlexPen 100 units/mL injection pen 10 units with each meal   Patient taking differently: Inject 10 Units under the skin 3 (three) times a day with meals 2/5/21   Simran First,    omeprazole (PriLOSEC) 20 mg delayed release capsule Take 1 capsule (20 mg total) by mouth daily in the early morning PRN 10/4/22   Bladimir Norwood MD   ondansetron (Zofran ODT) 8 mg disintegrating tablet Take 1 tablet (8 mg total) by mouth every 8 (eight) hours as needed for nausea or vomiting 9/2/22   Benito Babin MD   oxybutynin (DITROPAN-XL) 10 MG 24 hr tablet TAKE ONE TABLET BY MOUTH EVERY DAY 11/7/22   Bladimir Norwood MD   oxyCODONE (OxyCONTIN) 10 mg 12 hr tablet Take 1 tablet (10 mg total) by mouth every 8 (eight) hours Max Daily Amount: 30 mg 9/26/22 April D EVANGELINA Byrne   oxyCODONE (Roxicodone) 5 immediate release tablet Take 1 tablet (5 mg total) by mouth every 4 (four) hours as needed for moderate pain Max Daily Amount: 30 mg 8/29/22 April D EVANGELINA Byrne   Patiromer Sorbitex Calcium 8 4 g PACK Take 8 4 packets by mouth 8/26/22   Historical Provider, MD   phenazopyridine (PYRIDIUM) 200 mg tablet Take 1 tablet (200 mg total) by mouth 3 (three) times a day with meals 6/9/22   Bladimir Norwood MD   polyethylene glycol (MIRALAX) 17 g packet Take 17 g by mouth daily 9/26/22 April EVANGELINA Miller   predniSONE 20 mg tablet Take 1 tablet (20 mg total) by mouth daily 9/2/22   Benito Babin MD   senna (SENOKOT) 8 6 MG tablet Take 2 tablets (17 2 mg total) by mouth 2 (two) times a day 9/26/22 April D EVANGELINA Byrne   sodium chloride, PF, 0 9 % 10 mL by Intracatheter route daily Intracatheter flushing daily 22  Berny Castaneda MD   sodium chloride, PF, 0 9 % 10 mL by Intracatheter route daily Intracatheter flushing daily 22  Berny Castaneda MD   sodium chloride, PF, 0 9 % 10 mL by Intracatheter route daily Bilateral PCNs to be flushed daily with prefilled 10cc NS 22   Mikal Reid PA-C   sulfamethoxazole-trimethoprim (BACTRIM DS) 800-160 mg per tablet Take 1 tablet by mouth every 12 (twelve) hours for 7 days 22  Jenny Liao MD   tamsulosin [facility]) 0 4 mg Take 1 capsule (0 4 mg total) by mouth daily with dinner 22   Yoel Patiño PA-C   betamethasone valerate (VALISONE) 0 1 % cream Apply topically 2 (two) times a day 21  Evens Meadows MD     I have reviewed home medications with patient personally  Allergies:    Allergies   Allergen Reactions   • Atorvastatin Hives, Itching and Rash   • Simvastatin Rash and Edema     Edema of lower legs   • Statins Hives and Itching   • Insulin Lispro Swelling and Edema     " Lower Legs"   • Other Itching, Rash and Other (See Comments)     "EKG Patches"   "blue EKG patches"       Social History:     Social History     Substance and Sexual Activity   Alcohol Use Never    Comment: beer / liquor     Social History     Tobacco Use   Smoking Status Former   • Packs/day: 3 00   • Years: 27 00   • Pack years: 81 00   • Types: Cigarettes   • Quit date: 5   • Years since quittin 9   Smokeless Tobacco Never     Social History     Substance and Sexual Activity   Drug Use Not Currently   • Types: Marijuana    Comment: quit 2019 had medical marijuana       Family History:    Family History   Problem Relation Age of Onset   • Diabetes Mother    • Heart disease Mother    • Other Mother         High blood pressure   • Heart disease Father    • Diabetes Sister    • Other Sister         High blood pressure   • Kidney disease Sister    • Heart disease Brother    • Other Brother         High blood pressure Physical Exam:     Vitals:   Blood Pressure: 126/74 (11/17/22 1806)  Pulse: 74 (11/17/22 1806)  Temperature: 97 9 °F (36 6 °C) (11/17/22 1806)  Temp Source: Oral (11/17/22 1806)  Respirations: 16 (11/17/22 1806)  Height: 6' 3 5" (191 8 cm) (11/17/22 1744)  Weight - Scale: 92 kg (202 lb 13 2 oz) (11/17/22 1744)  SpO2: 100 % (11/17/22 1806)    Constitutional: Patient is oriented to person, place and time, no acute distress, ill-appearing   HEENT:  Normocephalic, atraumatic  Cardiovascular: Normal S1S2, RRR, No murmurs/rubs/gallops appreciated  Pulmonary:  Bilateral air entry, No rhonchi/rales/wheezing appreciated, left PleurX catheter in place  Abdominal: Soft, Bowel sounds present, Non-tender, Non-distended  Extremities:  No cyanosis, clubbing or edema  Neurological:  Awake, alert  Bilateral nephrostomy tubes, right with blood tinged drainage, left draining clear yellow urine    Additional Data:     Lab Results: I have personally reviewed pertinent reports  Results from last 7 days   Lab Units 11/17/22  1420   WBC Thousand/uL 9 13   HEMOGLOBIN g/dL 7 3*   HEMATOCRIT % 22 3*   PLATELETS Thousands/uL 256   NEUTROS PCT % 75   LYMPHS PCT % 13*   MONOS PCT % 9   EOS PCT % 1     Results from last 7 days   Lab Units 11/17/22  1553 11/17/22  1420   POTASSIUM mmol/L 5 0 5 6*   CHLORIDE mmol/L  --  103   CO2 mmol/L  --  23   BUN mg/dL  --  51*   CREATININE mg/dL  --  3 58*   CALCIUM mg/dL  --  8 3     Results from last 7 days   Lab Units 11/17/22  1456   INR  1 03       Imaging: I have personally reviewed pertinent reports  CT abdomen pelvis wo contrast    Result Date: 11/17/2022  Narrative: CT ABDOMEN AND PELVIS WITHOUT IV CONTRAST INDICATION:   Hematuria, gross/macroscopic hematuria   COMPARISON:  10/4/2022 TECHNIQUE:  CT examination of the abdomen and pelvis was performed without intravenous contrast  Axial, sagittal, and coronal 2D reformatted images were created from the source data and submitted for interpretation  Radiation dose length product (DLP) for this visit:  657 mGy-cm   This examination, like all CT scans performed in the [facility], was performed utilizing techniques to minimize radiation dose exposure, including the use of iterative reconstruction and automated exposure control  Enteric contrast was not administered in accordance with physician request  FINDINGS: ABDOMEN LOWER CHEST:  There is an increased left pleural effusion with chest tube in place  There is a stable 2 mm right upper lobe nodule on series 2 image 2  Again seen are pleural-based metastasis anteriorly on the left with the largest lesion measuring 2 0  x 3 6 cm on series 2 image 15, previously 1 9 x 3 7 cm, with new adjacent lymph node in the epicardial fat measuring 1 2 x 1 5 cm  There is mild high density posteriorly in the left lower lobe which may also represent pleural metastasis though suboptimally evaluated without contrast  LIVER/BILIARY TREE:  Unremarkable  GALLBLADDER:  Gallbladder is surgically absent  SPLEEN:  Unremarkable  PANCREAS:  Unremarkable  ADRENAL GLANDS:  Unremarkable  KIDNEYS/URETERS:  There are bilateral nephrostomies without hydronephrosis  The left ureter is dilated to the bladder  This is similar to the previous study  There is a left renal low-density mass measuring 3 0 x 5 6 cm, previously 3 0 x 5 9 cm  STOMACH AND BOWEL:  Status post gastric bypass  Bowel is unremarkable  APPENDIX:  No findings to suggest appendicitis  ABDOMINOPELVIC CAVITY:  No ascites  No pneumoperitoneum  There is retroperitoneal lymphadenopathy which is improved  A left para-aortic lymph node on series 2 image 43 measures 1 5 x 2 2 cm, previously 2 1 x 3 0 cm  A left para-aortic lymph node on series 2 image 52 measures 1 4 x 1 6 cm, previously 1 9 x 2 3 cm  VESSELS:  Unremarkable for patient's age  PELVIS REPRODUCTIVE ORGANS:  Unremarkable for patient's age   URINARY BLADDER:  There is bladder wall thickening consistent with known neoplasm, asymmetrically involving the anterior and left bladder wall as well as the left ureterovesical junction  ABDOMINAL WALL/INGUINAL REGIONS:  Unremarkable  OSSEOUS STRUCTURES:  No acute fracture or destructive osseous lesion  Impression: 1  Stable eccentric bladder wall thickening on the left involving the left ureterovesical junction compatible with known neoplasm  2   No hydronephrosis with nephrostomy catheters are in place  Unchanged left hydroureter  3   Improved metastatic retroperitoneal lymphadenopathy  4   Grossly stable pleural metastasis with increased left pleural effusion and new lymphadenopathy in the adjacent epicardial fat  Evaluation is limited without intravenous contrast  Workstation performed: [id number]     XR chest 2 views    Result Date: 11/17/2022  Narrative: CHEST INDICATION:   dyspnea  COMPARISON:  9/22/2022  EXAM PERFORMED/VIEWS:  XR CHEST PA & LATERAL FINDINGS:  Stably positioned right-sided chest port  Loop recorder device overlies the left lower hemithorax  Cardiomediastinal silhouette appears unremarkable  No focal airspace consolidation  Trace left pleural effusion  No pneumothorax  Partially visualized cervical fusion hardware  Impression: Trace left pleural effusion  Workstation performed: [id number]     IR nephrostomy tube check/change/reposition/reinsertion/upsize    Result Date: 11/15/2022  Narrative: PROCEDURE: Bilateral PCN catheter exchange Procedural Personnel Attending physician(s): Dr Sil Gallagher Pre-procedure diagnosis: Hydronephrosis Post-procedure diagnosis: Same Indication: Patient with history of bladder cancer status post TURBT and chemoradiation therapy with bilateral hydronephrosis status post bilateral PCN returns for routine 3 month catheter exchange   PROCEDURE SUMMARY - Bilateral PCN catheter exchange under fluoroscopic guidance - Antegrade nephrostograms via the existing access PROCEDURE DETAILS: Pre-procedure Consent: Existing informed consent was utilized and time-out was performed prior to the procedure  Preparation: The site was prepared and draped using maximal sterile barrier technique including cutaneous antisepsis  Anesthesia/sedation Level of anesthesia/sedation: Local lidocaine Right PCN catheter exchange Local anesthesia was administered  Existing catheter was exchanged for a new 10 2 Western Karishma catheter over a Bentson wire  Contrast was injected through the new catheter to confirm proper positioning and to perform an antegrade nephrostogram  Left PCN catheter exchange Local anesthesia was administered  Existing catheter was exchanged for a new 10 2 [place] Kar[place]ma catheter over a Bentson wire  Contrast was injected through the new catheter to confirm proper positioning and to perform an antegrade nephrostogram  Contrast Contrast agent: Omnipaque Contrast volume (mL): 15 Radiation Dose Fluoroscopy time (minutes): 1 5  Reference air kerma (mGy): 40 Additional Details Estimated blood loss (mL): None Complications: No immediate complications  Impression: 1  Bilateral PCN exchange using 10 2 [place] catheters  2  Bilateral antegrade nephrostograms showed distal ureteral obstruction  Plan: Return in 3 months for routine catheter exchange  Workstation performed: [id number]     [employer] / [facility] Records Reviewed: Yes     ** Please Note: This note has been constructed using a voice recognition system   **

## 2022-11-17 NOTE — TELEPHONE ENCOUNTER
----- Message from Peyman Huggins MD sent at 11/16/2022  4:13 PM EST -----  Please call the patient regarding his abnormal result    Hemoglobin went down to 7 point night please send reports to the oncologist and the urologist since he has a history of bladder cancer being treated

## 2022-11-17 NOTE — ASSESSMENT & PLAN NOTE
· Patient was recently treated outpatient for UTI with nitrofurantoin and Bactrim without improvement  · Urinalysis consistent with UTI  · Will start ceftriaxone follow-up cultures

## 2022-11-17 NOTE — ASSESSMENT & PLAN NOTE
· Patient has left tunneled pleural catheter according to him and his wife has been draining this more frequently  · Follows pulmonary outpatient

## 2022-11-17 NOTE — TELEPHONE ENCOUNTER
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

## 2022-11-17 NOTE — ASSESSMENT & PLAN NOTE
Lab Results   Component Value Date    EGFR 15 11/17/2022    EGFR 18 11/10/2022    EGFR 19 10/20/2022    CREATININE 3 58 (H) 11/17/2022    CREATININE 3 11 (H) 11/10/2022    CREATININE 2 93 (H) 10/20/2022   · Acute kidney injury on CKD stage 4  · Likely secondary to anemia due to hematuria  · Baseline creatinine high 2s low 3s  · Plan for blood transfusion  · Hold diuretics  · Monitor renal function closely

## 2022-11-17 NOTE — ED PROVIDER NOTES
History  Chief Complaint   Patient presents with   • Abnormal Lab     Pt reports bleeding from nephrostomy/penis x1 week  Hemoglobin 7 9 yesterday  Sent to ER  Pt reports weakness and dizziness         77y  o male with PMH of anemia, anxiety, arteriosclerotic cardiovascular disease, arthritis, bladder cancer, CKD stage 4, colon polyp, CAD with 7 stents, depression, DM, GERD, glaucoma, hematuria, HLD, HTN, insomnia, lung cancer, PAD, lumbar spinal stenosis and transient cerebral ischemia presents to the ER for hematuria and bleeding from his nephrostomy tubes for 1 week  Patient reports since he has been bleeding, he has noticed increased dyspnea with exertion, weakness and dizziness  He had his hgb checked yesterday, which was 7 9  He denies blood thinner use other then Aspirin  He reports dysuria  He states when he urinates, his urine is very thick and appears to be just blood  He denies fever, chills, URI symptoms, chest pain, N/V/D, abdominal pain, frequency, urgency, weakness or paresthesias  History provided by:  Patient   used: No        Prior to Admission Medications   Prescriptions Last Dose Informant Patient Reported? Taking?    ALPRAZolam (XANAX) 0 25 mg tablet   No No   Sig: Take 2 tablets (0 5 mg total) by mouth daily at bedtime as needed for anxiety   ARIPiprazole (ABILIFY) 2 mg tablet   No No   Sig: Take 1 tablet (2 mg total) by mouth daily   Accu-Chek Softclix Lancets lancets   No No   Sig: Test blood sugar 4 times daily   Azelastine HCl 0 15 % SOLN   No No   Sig: Inhale 1 spray 2 (two) times a day   Bimatoprost (LUMIGAN OP)   Yes No   Sig: Apply 1 drop to eye daily at bedtime    DULoxetine (CYMBALTA) 30 mg delayed release capsule   No No   Sig: TAKE ONE CAPSULE BY MOUTH EVERY DAY   Ferrous Sulfate Dried (Feosol) 200 (65 Fe) MG TABS   No No   Sig: Take 65 mg by mouth daily   Insulin Pen Needle 31G X 8 MM MISC   No No   Sig: Inject 3 times a day   Lantus SoloStar 100 units/mL injection pen   No No   Si units qhs   Patient taking differently: Inject 18 Units under the skin daily at bedtime   NovoLOG FlexPen 100 units/mL injection pen   No No   Sig: 10 units with each meal    Patient taking differently: Inject 10 Units under the skin 3 (three) times a day with meals   Patiromer Sorbitex Calcium 8 4 g PACK   Yes No   Sig: Take 8 4 packets by mouth   acetaminophen (TYLENOL) 325 mg tablet   Yes No   Sig: Take 650 mg by mouth every 6 (six) hours as needed for mild pain     aspirin (ECOTRIN LOW STRENGTH) 81 mg EC tablet   No No   Sig: Take 1 tablet (81 mg total) by mouth daily   calcitriol (ROCALTROL) 0 25 mcg capsule   No No   Sig: Take 1 capsule (0 25 mcg total) by mouth daily   cetirizine (ZyrTEC) 10 MG chewable tablet   Yes No   Sig: Chew 10 mg daily   ezetimibe (ZETIA) 10 mg tablet   No No   Sig: Take 1 tablet (10 mg total) by mouth daily   fluocinonide (LIDEX) 0 05 % cream   No No   Sig: Apply topically 2 (two) times a day   Patient taking differently: Apply topically as needed   fluticasone (FLONASE) 50 mcg/act nasal spray   No No   Si spray into each nostril daily   Patient taking differently: 1 spray into each nostril as needed   furosemide (LASIX) 20 mg tablet   No No   Sig: Take 1 tablet (20 mg total) by mouth daily   gabapentin (NEURONTIN) 300 mg capsule   No No   Sig: TAKE ONE CAPSULE BY MOUTH EVERY DAY AT BEDTIME   glucose blood (Accu-Chek Martha Plus) test strip   No No   Sig: Test blood sugar 4 times daily   hydrocortisone 2 5 % cream   No No   Sig: Apply topically 2 (two) times a day as needed for irritation or rash   isosorbide mononitrate (IMDUR) 30 mg 24 hr tablet   No No   Sig: TAKE ONE TABLET BY MOUTH EVERY DAY IN THE MORNING   lidocaine (XYLOCAINE) 2 % topical gel   No No   Sig: Apply topically as needed for mild pain   lidocaine-prilocaine (EMLA) cream   No No   Sig: Apply topically as needed for mild pain   loperamide (IMODIUM) 2 mg capsule   Yes No   Sig: Take 2 mg by mouth 4 (four) times a day as needed for diarrhea   metoprolol succinate (TOPROL-XL) 100 mg 24 hr tablet   No No   Sig: TAKE ONE TABLET BY MOUTH EVERY DAY   multivitamin (THERAGRAN) TABS   Yes No   Sig: Take 1 tablet by mouth daily  naloxone (NARCAN) 4 mg/0 1 mL nasal spray   No No   Sig: Administer 1 spray into a nostril  If no response after 2-3 minutes, give another dose in the other nostril using a new spray     nitrofurantoin (MACROBID) 100 mg capsule   No No   Sig: Take 1 capsule (100 mg total) by mouth 2 (two) times a day   Patient not taking: Reported on 11/15/2022   omeprazole (PriLOSEC) 20 mg delayed release capsule   No No   Sig: Take 1 capsule (20 mg total) by mouth daily in the early morning PRN   ondansetron (Zofran ODT) 8 mg disintegrating tablet   No No   Sig: Take 1 tablet (8 mg total) by mouth every 8 (eight) hours as needed for nausea or vomiting   oxyCODONE (OxyCONTIN) 10 mg 12 hr tablet   No No   Sig: Take 1 tablet (10 mg total) by mouth every 8 (eight) hours Max Daily Amount: 30 mg   oxyCODONE (Roxicodone) 5 immediate release tablet   No No   Sig: Take 1 tablet (5 mg total) by mouth every 4 (four) hours as needed for moderate pain Max Daily Amount: 30 mg   oxybutynin (DITROPAN-XL) 10 MG 24 hr tablet   No No   Sig: TAKE ONE TABLET BY MOUTH EVERY DAY   phenazopyridine (PYRIDIUM) 200 mg tablet   No No   Sig: Take 1 tablet (200 mg total) by mouth 3 (three) times a day with meals   polyethylene glycol (MIRALAX) 17 g packet   No No   Sig: Take 17 g by mouth daily   predniSONE 20 mg tablet   No No   Sig: Take 1 tablet (20 mg total) by mouth daily   senna (SENOKOT) 8 6 MG tablet   No No   Sig: Take 2 tablets (17 2 mg total) by mouth 2 (two) times a day   sodium chloride, PF, 0 9 %   No No   Sig: 10 mL by Intracatheter route daily Intracatheter flushing daily   sodium chloride, PF, 0 9 %   No No   Sig: 10 mL by Intracatheter route daily Intracatheter flushing daily   sodium chloride, PF, 0 9 % No No   Sig: 10 mL by Intracatheter route daily Bilateral PCNs to be flushed daily with prefilled 10cc NS   sulfamethoxazole-trimethoprim (BACTRIM DS) 800-160 mg per tablet   No No   Sig: Take 1 tablet by mouth every 12 (twelve) hours for 7 days   tamsulosin (FLOMAX) 0 4 mg   No No   Sig: Take 1 capsule (0 4 mg total) by mouth daily with dinner      Facility-Administered Medications: None       Past Medical History:   Diagnosis Date   • Anemia     Last assessed: 9/28/17   • Anxiety    • Arteriosclerotic cardiovascular disease     Last assessed: 9/28/17   • Arthritis    • Bladder cancer ([facility] )     bladder- had BCG treatments   • Chronic kidney disease     Stage IV   • CKD (chronic kidney disease) stage 4, GFR 15-29 ml/min ([facility])    • Colon polyp    • Coronary artery disease     7 stents   • Depression    • Diabetes mellitus ([facility])     IDDM   • GERD (gastroesophageal reflux disease)    • Glaucoma    • Hematuria    • History of fusion of cervical spine    • Hyperlipidemia    • Hypertension    • Insomnia     Last assessed: 11/14/12   • Loss of hearing     has hearing aids but usually does not wear them   • Lung cancer ([facility] )    • Metastatic cancer ([facility] )    • Other seasonal allergic rhinitis     Last assessed: 2/10/16   • PAD (peripheral artery disease) ([facility])    • Shortness of breath     on exertion   • Spinal stenosis of lumbar region    • Transient cerebral ischemia     No Residual   • Uses walker     w/c for longer distances       Past Surgical History:   Procedure Laterality Date   • CARDIAC SURGERY      Cath stent placement  Last assessed: 3/9/17  Interventional Catheterization   • CHOLECYSTECTOMY     • COLONOSCOPY     • CYSTOSCOPY      Diagnostic w/biopsy  Petrona Last  Last assessed: 12/1/14   • CYSTOSCOPY N/A 04/12/2022    Procedure: CYSTOSCOPY  Bladder biopsies  ;  Surgeon: Alia Amezcua MD;  [facility];  Service: Urology   • CYSTOSCOPY W/ RETROGRADES Right 03/01/2022    Procedure: CYSTO; stent removal retrograde;  Surgeon: Franki Fraga MD;  [facility];  Service: Urology   • CYSTOSCOPY W/ URETERAL STENT PLACEMENT Bilateral 10/18/2021    Procedure: bilateral retrogrades, cytology collection;  Surgeon: Franki Fraga MD;  [facility];  Service: Urology   • CYSTOURETHROSCOPY      w/cautery  [facility]   • FL RETROGRADE PYELOGRAM  10/18/2021   • FL RETROGRADE PYELOGRAM  10/24/2021   • GASTRIC BYPASS      For morbid obesity w/Shaji-en-Y   Resolved: 11/17/09   • INCISION AND DRAINAGE OF WOUND Right 02/26/2017    Procedure: INCISION AND DRAINAGE (I&D) EXTREMITY WITH APPLICATION OF GRAFT JACKET;  Surgeon: Karla Azar DPM;  [facility];  Service:    • INCISION AND DRAINAGE OF WOUND Right 04/25/2017    Procedure: INCISION AND DRAINAGE (I&D) EXTREMITY, APPLICATION OF GRAFT;  Surgeon: Karla Azar DPM;  [facility];  Service:    • IR BIOPSY OTHER  07/02/2020   • IR LOWER EXTREMITY ANGIOGRAM  02/08/2021   • IR LOWER EXTREMITY ANGIOGRAM  02/11/2021   • IR NEPHROSTOMY TUBE CHECK/CHANGE/REPOSITION/REINSERTION/UPSIZE  04/28/2022   • IR NEPHROSTOMY TUBE CHECK/CHANGE/REPOSITION/REINSERTION/UPSIZE  05/24/2022   • IR NEPHROSTOMY TUBE CHECK/CHANGE/REPOSITION/REINSERTION/UPSIZE  06/07/2022   • IR NEPHROSTOMY TUBE CHECK/CHANGE/REPOSITION/REINSERTION/UPSIZE  07/28/2022   • IR NEPHROSTOMY TUBE CHECK/CHANGE/REPOSITION/REINSERTION/UPSIZE  11/15/2022   • IR NEPHROSTOMY TUBE PLACEMENT  02/25/2022   • IR PORT PLACEMENT  01/17/2022   • IR THORACENTESIS  09/02/2022   • IR THORACENTESIS  09/14/2022   • IR THORACENTESIS WITH TUBE PLACEMENT Left     october   • IR TUNNELED CENTRAL LINE PLACEMENT  12/24/2020   • JOINT REPLACEMENT      christofer knees replaced   • AZ AMPUTATION METATARSAL+TOE,SINGLE Left 12/21/2020    Procedure: RAY RESECTION FOOT;  Surgeon: Lizz Chin DPM;  [facility];  Service: Podiatry   • AZ AMPUTATION METATARSAL+TOE,SINGLE Left 12/31/2020    Procedure: 5TH MET RESECTION;  Surgeon: Tatiana Virk DPM;  [facility];  Service: Podiatry   • [place] CYSTOURETHROSCOPY W/IRRIG & EVAC CLOTS N/A 02/10/2021    Procedure: CYSTOSCOPY EVACUATION OF CLOTS, fulguration;  Surgeon: Cristhian Ramey MD;  [facility];  Service: Urology   • [place] CYSTOURETHROSCOPY W/IRRIG & EVAC CLOTS N/A 10/24/2021    Procedure: CYSTOSCOPY EVACUATION OF CLOT, fulguration of bleeding vessels, right ureter stent placement, retrograde pyelogram;  Surgeon: Lee Kuhn MD;  [facility];  Service: Urology   • KY CYSTOURETHROSCOPY,BIOPSY N/A 08/16/2016    Procedure: Bridgett Fragoso;  Surgeon: Isabelle Burt MD;  [facility];  Service: Urology   • KY CYSTOURETHROSCOPY,FULGUR <0 5 CM LESN N/A 11/19/2020    Procedure: CYSTO W/BIOPSIES, transurethral prostate bx;  Surgeon: Garrick Moss MD;  [facility];  Service: Urology   • KY CYSTOURETHROSCOPY,FULGUR >5 CM LESN Bilateral 10/18/2021    Procedure: TRANSURETHRAL RESECTION OF BLADDER TUMOR (TURBT);   Surgeon: Kalina Soria MD;  [facility];  Service: Urology   • [place] Betina Mo 3RD+ ORD [address] PEL/LXTR [facility] Left 02/08/2021    Procedure: LEG angiogram, CO2 w/limited contrast with balloon angioplasty postertior tibial artery;  Surgeon: Rema Opitz, MD;  [facility];  Service: Vascular   • ROTATOR CUFF REPAIR     • SMALL INTESTINE SURGERY      Surgery Shaji-en-Y   • SPINAL FUSION      lumbar and cervical fusions   • VAC DRESSING APPLICATION Right 94/50/9673    Procedure: APPLICATION VAC DRESSING;  Surgeon: Evelina Dunlap DPM;  [facility];  Service:    • WOUND DEBRIDEMENT Left 02/16/2021    Procedure: FOOT DEBRIDE, [address] OUT w/graft application;  Surgeon: Evelina Dunlap DPM;  [facility];  Service: Podiatry       Family History   Problem Relation Age of Onset   • Diabetes Mother    • Heart disease Mother    • Other Mother         High blood pressure   • Heart disease Father    • Diabetes Sister    • Other Sister         High blood pressure   • Kidney disease Sister    • Heart disease Brother    • Other Brother         High blood pressure     I have reviewed and agree with the history as documented  E-Cigarette/Vaping   • E-Cigarette Use Never User      E-Cigarette/Vaping Substances   • Nicotine No    • THC No    • CBD No    • Flavoring No    • Other No    • Unknown No      Social History     Tobacco Use   • Smoking status: Former     Packs/day: 3 00     Years: 27 00     Pack years: 81 00     Types: Cigarettes     Quit date: 1970     Years since quittin 9   • Smokeless tobacco: Never   Vaping Use   • Vaping Use: Never used   Substance Use Topics   • Alcohol use: Never     Comment: beer / liquor   • Drug use: Not Currently     Types: Marijuana     Comment: quit 2019 had medical marijuana       Review of Systems   Constitutional: Negative for activity change, appetite change, chills and fever  HENT: Negative for congestion, drooling, ear discharge, ear pain, facial swelling, rhinorrhea and sore throat  Eyes: Negative for redness  Respiratory: Positive for shortness of breath  Negative for cough  Cardiovascular: Negative for chest pain  Gastrointestinal: Negative for abdominal pain, diarrhea, nausea and vomiting  Genitourinary: Positive for decreased urine volume, dysuria, hematuria and penile pain  Negative for flank pain, frequency, penile swelling, scrotal swelling, testicular pain and urgency  Musculoskeletal: Negative for back pain and neck stiffness  Skin: Negative for rash  Allergic/Immunologic: Negative for food allergies  Neurological: Positive for dizziness and weakness  Negative for numbness  Physical Exam  Physical Exam  Vitals and nursing note reviewed  Constitutional:       General: He is active  He is not in acute distress  Appearance: He is not toxic-appearing  HENT:      Head: Normocephalic and atraumatic     Eyes: Conjunctiva/sclera: Conjunctivae normal    Neck:      Trachea: No tracheal deviation  Cardiovascular:      Rate and Rhythm: Normal rate and regular rhythm  Heart sounds: Normal heart sounds, S1 normal and S2 normal  No murmur heard  No friction rub  No gallop  Pulmonary:      Effort: Pulmonary effort is normal  No respiratory distress  Breath sounds: Normal breath sounds  No decreased breath sounds, wheezing, rhonchi or rales  Chest:      Chest wall: No tenderness  Abdominal:      General: Bowel sounds are normal  There is no distension  Palpations: Abdomen is soft  Tenderness: There is no abdominal tenderness  There is no right CVA tenderness, left CVA tenderness, guarding or rebound  Genitourinary:     Penis: Uncircumcised  No phimosis, paraphimosis, erythema, tenderness or swelling  Testes: Normal       Comments: No bleeding at urethral meatus seen  No swelling or drainage  Patient reports dark red blood when urinating  Patient reports urine is very thick  Nephrostomy tubes in place in bilateral flanks  No erythema or drainage  No tenderness to palpation  Blood seen in right nephrostomy tubing  Normal urine in left nephrostomy tubing  Musculoskeletal:      Cervical back: Normal range of motion and neck supple  Skin:     General: Skin is warm and dry  Findings: No rash  Neurological:      Mental Status: He is alert  GCS: GCS eye subscore is 4  GCS verbal subscore is 5  GCS motor subscore is 6     Psychiatric:         Mood and Affect: Mood and affect and mood normal          Vital Signs  ED Triage Vitals   Temperature Pulse Respirations Blood Pressure SpO2   11/17/22 1301 11/17/22 1258 11/17/22 1258 11/17/22 1258 11/17/22 1258   97 5 °F (36 4 °C) 90 16 126/64 97 %      Temp Source Heart Rate Source Patient Position - Orthostatic VS BP Location FiO2 (%)   11/17/22 1301 11/17/22 1258 11/17/22 1258 11/17/22 1258 --   Oral Monitor Sitting Right arm       Pain Score 11/17/22 1258       No Pain           Vitals:    11/17/22 1258 11/17/22 1626   BP: 126/64 129/76   Pulse: 90 72   Patient Position - Orthostatic VS: Sitting          Visual Acuity      ED Medications  Medications - No data to display    Diagnostic Studies  Results Reviewed     Procedure Component Value Units Date/Time    HS Troponin I 2hr [[id number]] Collected: 11/17/22 1626    Lab Status: In process Specimen: Blood from Arm, Right Updated: 11/17/22 1636    Potassium [[id number]]  (Normal) Collected: 11/17/22 1553    Lab Status: Final result Specimen: Blood from Arm, Right Updated: 11/17/22 1614     Potassium 5 0 mmol/L     HS Troponin I 4hr [[id number]]     Lab Status: No result Specimen: Blood     Urine Microscopic [[id number]]  (Abnormal) Collected: 11/17/22 1554    Lab Status: Final result Specimen: Urine Updated: 11/17/22 1558     RBC, UA Innumerable /hpf      WBC, UA       Field obscured, unable to enumerate     /hpf     Epithelial Cells       Field obscured, unable to enumerate     /hpf     Bacteria, UA       Field obscured, unable to enumerate     /hpf    Urine culture [[id number]] Collected: 11/17/22 1554    Lab Status: In process Specimen: Urine Updated: 11/17/22 1555    UA w Reflex to Microscopic w Reflex to Culture [[id number]] Collected: 11/17/22 1554    Lab Status: In process Specimen: Urine Updated: 11/17/22 1555    Urine Microscopic [[id number]]  (Abnormal) Collected: 11/17/22 1459    Lab Status: Final result Specimen: Urine, Other Updated: 11/17/22 1554     RBC, UA 4-10 /hpf      WBC, UA 30-50 /hpf      Epithelial Cells Occasional /hpf      Bacteria, UA Occasional /hpf      OTHER OBSERVATIONS WBCs Clumped    Urine culture [[id number]] Collected: 11/17/22 1459    Lab Status:  In process Specimen: Urine, Other Updated: 11/17/22 1553    UA w Reflex to Microscopic w Reflex to Culture [[id number]]  (Abnormal) Collected: 11/17/22 1459    Lab Status: Final result Specimen: Urine, Other Updated: 11/17/22 1530 Color, UA Light Yellow     Clarity, UA Slightly Cloudy     Specific [place], UA 1 020     pH, UA 5 5     Leukocytes, UA Moderate     Nitrite, UA Negative     Protein, UA 30 (1+) mg/dl      Glucose, UA Negative mg/dl      Ketones, UA Negative mg/dl      Urobilinogen, UA 0 2 E U /dl      Bilirubin, UA Negative     Occult Blood, UA Moderate    Protime-INR [[id number]]  (Normal) Collected: 11/17/22 1456    Lab Status: Final result Specimen: Blood from Arm, Right Updated: 11/17/22 1523     Protime 13 5 seconds      INR 1 03    APTT [[id number]]  (Normal) Collected: 11/17/22 1456    Lab Status: Final result Specimen: Blood from Arm, Right Updated: 11/17/22 1523     PTT 31 seconds     HS Troponin 0hr (reflex protocol) [[id number]]  (Normal) Collected: 11/17/22 1420    Lab Status: Final result Specimen: Blood from Arm, Left Updated: 11/17/22 1459     hs TnI 0hr 12 ng/L     Basic metabolic panel [[id number]]  (Abnormal) Collected: 11/17/22 1420    Lab Status: Final result Specimen: Blood from Arm, Left Updated: 11/17/22 1448     Sodium 137 mmol/L      Potassium 5 6 mmol/L      Chloride 103 mmol/L      CO2 23 mmol/L      ANION GAP 11 mmol/L      BUN 51 mg/dL      Creatinine 3 58 mg/dL      Glucose 211 mg/dL      Calcium 8 3 mg/dL      eGFR 15 ml/min/1 73sq m     Narrative:      [facility] guidelines for Chronic Kidney Disease (CKD):   •  Stage 1 with normal or high GFR (GFR > 90 mL/min/1 73 square meters)  •  Stage 2 Mild CKD (GFR = 60-89 mL/min/1 73 square meters)  •  Stage 3A Moderate CKD (GFR = 45-59 mL/min/1 73 square meters)  •  Stage 3B Moderate CKD (GFR = 30-44 mL/min/1 73 square meters)  •  Stage 4 Severe CKD (GFR = 15-29 mL/min/1 73 square meters)  •  Stage 5 End Stage CKD (GFR <15 mL/min/1 73 square meters)  Note: GFR calculation is accurate only with a steady state creatinine    CBC and differential [[id number]]  (Abnormal) Collected: 11/17/22 1420    Lab Status: Final result Specimen: Blood from Arm, Left Updated: 11/17/22 1438     WBC 9 13 Thousand/uL      RBC 2 28 Million/uL      Hemoglobin 7 3 g/dL      Hematocrit 22 3 %      MCV 98 fL      MCH 32 0 pg      MCHC 32 7 g/dL      RDW 18 4 %      MPV 10 1 fL      Platelets 743 Thousands/uL      nRBC 0 /100 WBCs      Neutrophils Relative 75 %      Immat GRANS % 1 %      Lymphocytes Relative 13 %      Monocytes Relative 9 %      Eosinophils Relative 1 %      Basophils Relative 1 %      Neutrophils Absolute 6 91 Thousands/µL      Immature Grans Absolute 0 09 Thousand/uL      Lymphocytes Absolute 1 17 Thousands/µL      Monocytes Absolute 0 83 Thousand/µL      Eosinophils Absolute 0 07 Thousand/µL      Basophils Absolute 0 06 Thousands/µL                  XR chest 2 views   ED Interpretation by Gio Shaffer PA-C (11/17 1616)   Left sided pleural effusion seen by me at this time  Final Result by Juan Welsh MD (11/17 1640)      Trace left pleural effusion  Workstation performed: [id number]         CT abdomen pelvis wo contrast   Final Result by Job Vann MD (11/17 1608)      1  Stable eccentric bladder wall thickening on the left involving the left ureterovesical junction compatible with known neoplasm  2   No hydronephrosis with nephrostomy catheters are in place  Unchanged left hydroureter  3   Improved metastatic retroperitoneal lymphadenopathy  4   Grossly stable pleural metastasis with increased left pleural effusion and new lymphadenopathy in the adjacent epicardial fat    Evaluation is limited without intravenous contrast             Workstation performed: [id number]                    Procedures  ECG 12 Lead Documentation Only    Date/Time: 11/17/2022 2:14 PM  Performed by: Gio Shaffer PA-C  Authorized by: Gio Shaffer PA-C     Indications / Diagnosis:  Dyspnea; weakness  ECG reviewed by me, the ED Provider: yes (Also reviewed by Dr Emma Rosa)    Patient location:  ED  Previous ECG:     Previous ECG:  Compared to current    Similarity:  No change  Interpretation:     Interpretation: normal    Rate:     ECG rate:  80    ECG rate assessment: normal    Rhythm:     Rhythm: sinus rhythm    Ectopy:     Ectopy: none    QRS:     QRS intervals:  Normal  ST segments:     ST segments:  Normal  T waves:     T waves: inverted      Inverted:  AVR  ECG 12 Lead Documentation Only    Date/Time: 11/17/2022 4:28 PM  Performed by: Cate Landis PA-C  Authorized by: Cate Landis PA-C     Indications / Diagnosis:  Delta ekg  ECG reviewed by me, the ED Provider: yes (Also reviewed by Dr Noe Richter)    Patient location:  ED  Previous ECG:     Previous ECG:  Compared to current    Similarity:  No change  Interpretation:     Interpretation: normal    Rate:     ECG rate:  72    ECG rate assessment: normal    Rhythm:     Rhythm: sinus rhythm    Ectopy:     Ectopy: none    QRS:     QRS intervals:  Normal  ST segments:     ST segments:  Normal  T waves:     T waves: inverted      Inverted:  AVR  CriticalCare Time  Performed by: Cate Landis PA-C  Authorized by: Cate Landis PA-C     Critical care provider statement:     Critical care time (minutes):  40    Critical care was necessary to treat or prevent imminent or life-threatening deterioration of the following conditions:  Circulatory failure    Critical care was time spent personally by me on the following activities:  Obtaining history from patient or surrogate, development of treatment plan with patient or surrogate, discussions with consultants, evaluation of patient's response to treatment, examination of patient, ordering and performing treatments and interventions, ordering and review of laboratory studies, ordering and review of radiographic studies and review of old charts    I assumed direction of critical care for this patient from another provider in my specialty: no               ED Course  ED Course as of 11/17/22 1701   u Nov 17, 2022   Nga Falcon text sent to Urology  [facility] with Cameron Hernandez PA-C from Urology  Will check imaging along with labs  Patient will need admission to [facility] for anemia management and possible IR consult  1443 Hemoglobin(!): 7 3  Will transfuse   1500 Creatinine(!): 3 58  worsening   1500 Transfusion consent signed  1500 hs TnI 0hr: 12   1513 Potassium(!): 5 6  Will recheck  1531 UA w Reflex to Microscopic w Reflex to Culture(!)   1535 Spoke with Cameron Hernandez PA-C from Urology  Dr Horan Running reviewed CT imaging  Plan is for admission to [facility], flush Ns every shift, transfuse as needed  Would consult IR if patient becomes unstable to assess AVF or pseudoaneurysm  Urology will place note in chart  26 CT abdomen pelvis wo contrast                               SBIRT 22yo+    Flowsheet Row Most Recent Value   SBIRT (25 yo +)    In order to provide better care to our patients, we are screening all of our patients for alcohol and drug use  Would it be okay to ask you these screening questions? No Filed at: 11/17/2022 1441   Initial Alcohol Screen: US AUDIT-C     1  How often do you have a drink containing alcohol? 0 Filed at: 11/17/2022 1441   2  How many drinks containing alcohol do you have on a typical day you are drinking? 0 Filed at: 11/17/2022 1441   3a  Male UNDER 65: How often do you have five or more drinks on one occasion? 0 Filed at: 11/17/2022 1441   3b  FEMALE Any Age, or MALE 65+: How often do you have 4 or more drinks on one occassion? 0 Filed at: 11/17/2022 1441   Audit-C Score 0 Filed at: 11/17/2022 1441   MARIELLE: How many times in the past year have you    Used an illegal drug or used a prescription medication for non-medical reasons?  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  Diagnosis management comments:     77y  o male presents to the ER for hematuria, bleeding from nephrostomy tube, dyspnea with exertion, dizziness and weakness for 1 week  Vitals stable  Patient in no acute distress  On exam, blood seen in right nephrostomy tube but not left  No signs of infection at nephrostomy sites  Abdomen is soft and non-tender  No injury to penis seen  No bleeding from urethral meatus seen  Lungs are clear  Will check labs and imaging  1700 Glenis Gorman text sent to Urology  [facility] with Niru Rizvi PA-C from Urology  Will check imaging along with labs  Patient will need admission to [address] [place] [address] for anemia management and possible IR consult  1443 Hemoglobin(!): 7 3 - Will transfuse since decreasing and patient is symptomatic     1500 Creatinine(!): 3 58 - worsening    1500 Transfusion consent signed by myself and patient  1500 hs TnI 0hr: 12    1513 Potassium(!): 5 6 - Slightly hemolyzed  Will recheck  1531 UA w Reflex to Microscopic w Reflex to Culture(!)    1535 Spoke with Niru Rizvi PA-C from Urology  Dr Caroline Isaac reviewed CT imaging  Plan is for admission to [facility], flush PCNs every shift, transfuse as needed  Would consult IR if patient becomes unstable to assess AVF or pseudoaneurysm  Urology will place note in chart  26 CT abdomen pelvis wo contrast     5     [place] text sent to [address] [place] [address] for admission  63     Spoke with Dr Mandi Black about admission  She is agreeable  Patient and wife agreeable to plan  Patient's wife informed me she has been draining his pleural cath more frequently  She was draining once a week and was getting about 200mL out but this week since Saturday, she has drained the cath 3 times and has gotten >100mL out each time  Will inform [facility]  Wife also wants [facility] to know patient is due for his infusion tomorrow  [facility] text sent to Dr Kumar Flores her of wife's concerns      Patient stable at time of transfer to [facility] care  Amount and/or Complexity of Data Reviewed  Clinical lab tests: ordered and reviewed  Tests in the radiology section of CPT®: ordered and reviewed  Review and summarize past medical records: yes  Discuss the patient with other providers: yes  Independent visualization of images, tracings, or specimens: yes    Patient Progress  Patient progress: stable      Disposition  Final diagnoses:   Anemia   Hematuria   CKD (chronic kidney disease)     Time reflects when diagnosis was documented in both MDM as applicable and the Disposition within this note     Time User Action Codes Description Comment    11/17/2022  4:33 PM Josselin [place] A Add [D64 9] Anemia     11/17/2022  4:33 PM Josselin [place] A Add [N39 0] UTI (urinary tract infection)     11/17/2022  4:33 PM Josselin [place] A Remove [N39 0] UTI (urinary tract infection)     11/17/2022  4:33 PM Josselin [place] A Add [R31 9] Hematuria     11/17/2022  4:33 PM Josselin [place] A Add [N18 9] CKD (chronic kidney disease)       ED Disposition     ED Disposition   Admit    Condition   Stable    Date/Time   Thu Nov 17, 2022  4:33 PM    Comment   Case was discussed with Dr Delmy Guajardo from [facility] and the patient's admission status was agreed to be Admission Status: inpatient status to the service of Dr Delmy Guajardo   Follow-up Information    None         Patient's Medications   Discharge Prescriptions    No medications on file       No discharge procedures on file      PDMP Review       Value Time User    PDMP Reviewed  Yes 9/26/2022 11:15 AM Leon Sam          ED Provider  Electronically Signed by           Jose Antonio Davis PA-C  11/17/22 5014

## 2022-11-17 NOTE — ASSESSMENT & PLAN NOTE
Lab Results   Component Value Date    HGBA1C 7 2 (H) 10/20/2022       No results for input(s): POCGLU in the last 72 hours      Blood Sugar Average: Last 72 hrs:  ·  will restart Lantus at a lower dose of 12 units HS  · Monitor Accu-Cheks, sliding scale for coverage

## 2022-11-17 NOTE — TELEPHONE ENCOUNTER
----- Message from Mily Durham MD sent at 11/16/2022  6:34 AM EST -----  Please call the patient regarding his abnormal result    Methylmalonic acid level is elevated is 706 he needs toward start taking vitamin B12 a 1000 units daily come to the office whenever he gets a chance in get B12 injection a 1000 units weekly x4 and then monthly will repeat the methylmalonic acid level with next lab work

## 2022-11-17 NOTE — TELEPHONE ENCOUNTER
----- Message from Peyman Huggins MD sent at 11/10/2022  9:28 PM EST -----  Please call the patient regarding his abnormal result  low  iron  continue  feosol  save  ov

## 2022-11-17 NOTE — TELEPHONE ENCOUNTER
I called [place] to give result, he said he was in the ER  He will call me tomorrow if he is not admitted

## 2022-11-17 NOTE — TELEPHONE ENCOUNTER
LVM for granddaughter and on his voicemail as well  Advising him per Dr Morgan patient should go to the ER to be evaluated  I spoke with granddaughter and provided her with this information  She is going to reach out to him and advise

## 2022-11-17 NOTE — TELEPHONE ENCOUNTER
I called yoanna to give result, he said he was in the ER  He will call me tomorrow if he is not admitted

## 2022-11-17 NOTE — TELEPHONE ENCOUNTER
----- Message from Osman Webster PA-C sent at 11/17/2022  1:11 PM EST -----  Yes defer 1 week  Thanks!  ----- Message -----  From: Jared Lyons RN  Sent: 11/17/2022  12:14 PM EST  To: Osman Webster PA-C    I am not sure if patient will go, but should we defer treatment?  ----- Message -----  From: Alma Zepeda RN  Sent: 11/17/2022  11:09 AM EST  To: NOLA Abarca  I spoke with Dr Morgan regarding his hemoglobin and the current bleeding he is having  We did advise him and his granddaughter to go to the emergency room to be evaluated  Thanks  VIRY Hess Ra, RN  ----- Message -----  From: Jared Lyons RN  Sent: 11/17/2022  10:57 AM EST  To: Alma Zepeda RN    Good morning,     I am a nurse that works with Dr Demetris Capps at [facility] Hematology Oncology office  I am reaching out in regards to this patient, Leandro Jane  He presented to the [facility] center and was noted to have hemoglobin of 7 9 and gross hematuria per Infusion nurse report  I am inquiring whether the patient will be going to the ED or not to see if I am to cancel this patient's chemotherapy infusion tomorrow on 11/18/22      Thank you  Charity Miner

## 2022-11-17 NOTE — ASSESSMENT & PLAN NOTE
· Status post TURBT and BCG, bilateral nephrostomy tubes  · History of bladder cancer follows Oncology outpatient

## 2022-11-17 NOTE — TELEPHONE ENCOUNTER
Please defer treatment scheduled for 11/18/22 by one week  Please update family of new date and time for infusion appointment for treatment       Thank you

## 2022-11-17 NOTE — TELEPHONE ENCOUNTER
Received call from [facility] infusion center in regards to patient reporting with HMG of 7 9 and gross hematuria in nephrostomy bags  Per Infusion RN, patient drove to appointment today and was very weak and fatigued  Per Infusion RN, reached out to Dr Alfred Paiz office in regards to the HMG and Hematuria  Patient was sent home from infusion center  Message out to Dr Alfred Paiz nurse, Jasmin Schmidt in regards to further instructions for patient if ED evaluation was recommended  Reviewed will monitor chart until the afternoon and reach out to Dr Berny Brady and or Ruth Ann Chapman PA-C for treatment recommendations for 11/18/22 for this patient

## 2022-11-17 NOTE — TREATMENT PLAN
Urology Treatment Plan:     Patient is a 61-year-old male known to our service with a history of recurrent BCG refractory bladder cancer  TURBT in October 2021 revealed MIBC  He follows with Oncology in his managed on Keytruda and Enfortumab  He also has bilateral ureteral obstruction for which he is managed on bilateral PCN since February of 2022  Recent exchange with IR on 11/15/2022  Outpatient hemoglobin yesterday, 11/16, was 7 9  He presented to the infusion center today but was sent to the ED for further evaluation given outpatient hemoglobin levels and bilateral bloody output from his PCNs  Patient is afebrile with stable vital signs in the ED  Hemoglobin is down to 7 3  CT in ED without significant clot burden in patient's bladder  Dr Emilie Mejia personally reviewed patient's films  No Urologic intervention required  He recommends the following:    -Admit to [facility], flush PCNs qshift, transfuse as needed  -If patient becomes unstable, would consult IR to assess for AVS for pseudoaneurysm of the kidney  Urology will sign off but remain available for any further inpatient needs  Please feel free to contact the provider currently covering the Urology [facility] role for this campus with questions or concerns      Vj Tee PA-C

## 2022-11-17 NOTE — ASSESSMENT & PLAN NOTE
· Stable, denies any chest pain  · Hold aspirin in setting of hematuria  · Continue metoprolol and isosorbide

## 2022-11-17 NOTE — ASSESSMENT & PLAN NOTE
This is a 68-year-old male with history of bladder cancer, bilateral nephrostomy tubes in place, CAD, CKD stage 4, diabetes mellitus, hypertension, hyperlipidemia presenting with hematuria for the past few days  Patient recently had exchange of nephrostomy tubes on 11/15/2022  Patient noted having hematuria from his right nephrostomy and also bloody discharge from his penile urethra  Complains also of dysuria  Denies any nausea, vomiting, fever, chills      · Hemoglobin 7 3, 1 unit PRBC ordered by ED physician  · CT scan revealed stable eccentric bladder wall thickening on the left involving left ureterovesicular junction compatible with known neoplasm, no hydronephrosis with nephrostomy tube catheter is in place, improved metastatic retroperitoneal lymphadenopathy, stable pleural metastasis with increased left pleural effusion and new and lymphadenopathy and adjacent epicardial fat  · Urology consulted by ED  · Recommending to flush PCNs every shift  · Monitor H&H closely transfuse further if needed  · Etiology stating if patient becomes unstable would need IR evaluation

## 2022-11-17 NOTE — TELEPHONE ENCOUNTER
Spoke with patient directly, patient will check mychart for schedule and will also ask infusion for a print out

## 2022-11-18 ENCOUNTER — HOSPITAL ENCOUNTER (OUTPATIENT)
Dept: INFUSION CENTER | Facility: HOSPITAL | Age: 77
End: 2022-11-18
Attending: INTERNAL MEDICINE

## 2022-11-18 LAB
ABO GROUP BLD BPU: NORMAL
ALBUMIN SERPL BCP-MCNC: 2.5 G/DL (ref 3.5–5)
ALP SERPL-CCNC: 110 U/L (ref 46–116)
ALT SERPL W P-5'-P-CCNC: 33 U/L (ref 12–78)
ANION GAP SERPL CALCULATED.3IONS-SCNC: 10 MMOL/L (ref 4–13)
AST SERPL W P-5'-P-CCNC: 39 U/L (ref 5–45)
ATRIAL RATE: 72 BPM
BACTERIA UR CULT: NORMAL
BASOPHILS # BLD AUTO: 0.04 THOUSANDS/ÂΜL (ref 0–0.1)
BASOPHILS NFR BLD AUTO: 1 % (ref 0–1)
BILIRUB SERPL-MCNC: 0.3 MG/DL (ref 0.2–1)
BPU ID: NORMAL
BUN SERPL-MCNC: 50 MG/DL (ref 5–25)
CALCIUM ALBUM COR SERPL-MCNC: 9.5 MG/DL (ref 8.3–10.1)
CALCIUM SERPL-MCNC: 8.3 MG/DL (ref 8.3–10.1)
CHLORIDE SERPL-SCNC: 105 MMOL/L (ref 96–108)
CO2 SERPL-SCNC: 23 MMOL/L (ref 21–32)
CREAT SERPL-MCNC: 3.37 MG/DL (ref 0.6–1.3)
CROSSMATCH: NORMAL
EOSINOPHIL # BLD AUTO: 0.09 THOUSAND/ÂΜL (ref 0–0.61)
EOSINOPHIL NFR BLD AUTO: 1 % (ref 0–6)
ERYTHROCYTE [DISTWIDTH] IN BLOOD BY AUTOMATED COUNT: 17.7 % (ref 11.6–15.1)
GFR SERPL CREATININE-BSD FRML MDRD: 16 ML/MIN/1.73SQ M
GLUCOSE SERPL-MCNC: 128 MG/DL (ref 65–140)
GLUCOSE SERPL-MCNC: 133 MG/DL (ref 65–140)
GLUCOSE SERPL-MCNC: 273 MG/DL (ref 65–140)
GLUCOSE SERPL-MCNC: 304 MG/DL (ref 65–140)
GLUCOSE SERPL-MCNC: 325 MG/DL (ref 65–140)
HCT VFR BLD AUTO: 25.6 % (ref 36.5–49.3)
HGB BLD-MCNC: 8.3 G/DL (ref 12–17)
IMM GRANULOCYTES # BLD AUTO: 0.11 THOUSAND/UL (ref 0–0.2)
IMM GRANULOCYTES NFR BLD AUTO: 1 % (ref 0–2)
LYMPHOCYTES # BLD AUTO: 1.35 THOUSANDS/ÂΜL (ref 0.6–4.47)
LYMPHOCYTES NFR BLD AUTO: 15 % (ref 14–44)
MCH RBC QN AUTO: 30.7 PG (ref 26.8–34.3)
MCHC RBC AUTO-ENTMCNC: 32.4 G/DL (ref 31.4–37.4)
MCV RBC AUTO: 95 FL (ref 82–98)
MONOCYTES # BLD AUTO: 1.06 THOUSAND/ÂΜL (ref 0.17–1.22)
MONOCYTES NFR BLD AUTO: 12 % (ref 4–12)
NEUTROPHILS # BLD AUTO: 6.16 THOUSANDS/ÂΜL (ref 1.85–7.62)
NEUTS SEG NFR BLD AUTO: 70 % (ref 43–75)
NRBC BLD AUTO-RTO: 0 /100 WBCS
P AXIS: 55 DEGREES
PLATELET # BLD AUTO: 219 THOUSANDS/UL (ref 149–390)
PMV BLD AUTO: 9.5 FL (ref 8.9–12.7)
POTASSIUM SERPL-SCNC: 5.2 MMOL/L (ref 3.5–5.3)
PR INTERVAL: 216 MS
PROT SERPL-MCNC: 6.7 G/DL (ref 6.4–8.4)
QRS AXIS: 20 DEGREES
QRSD INTERVAL: 94 MS
QT INTERVAL: 378 MS
QTC INTERVAL: 413 MS
RBC # BLD AUTO: 2.7 MILLION/UL (ref 3.88–5.62)
SODIUM SERPL-SCNC: 138 MMOL/L (ref 135–147)
T WAVE AXIS: 51 DEGREES
UNIT DISPENSE STATUS: NORMAL
UNIT PRODUCT CODE: NORMAL
UNIT PRODUCT VOLUME: 350 ML
UNIT RH: NORMAL
VENTRICULAR RATE: 72 BPM
WBC # BLD AUTO: 8.81 THOUSAND/UL (ref 4.31–10.16)

## 2022-11-18 RX ORDER — INSULIN GLARGINE 100 [IU]/ML
15 INJECTION, SOLUTION SUBCUTANEOUS
Status: DISCONTINUED | OUTPATIENT
Start: 2022-11-18 | End: 2022-11-19 | Stop reason: HOSPADM

## 2022-11-18 RX ADMIN — CALCITRIOL 0.25 MCG: 0.25 CAPSULE, LIQUID FILLED ORAL at 08:11

## 2022-11-18 RX ADMIN — EZETIMIBE 10 MG: 10 TABLET ORAL at 08:10

## 2022-11-18 RX ADMIN — INSULIN LISPRO 3 UNITS: 100 INJECTION, SOLUTION INTRAVENOUS; SUBCUTANEOUS at 17:01

## 2022-11-18 RX ADMIN — INSULIN GLARGINE 15 UNITS: 100 INJECTION, SOLUTION SUBCUTANEOUS at 21:25

## 2022-11-18 RX ADMIN — INSULIN LISPRO 3 UNITS: 100 INJECTION, SOLUTION INTRAVENOUS; SUBCUTANEOUS at 12:36

## 2022-11-18 RX ADMIN — DULOXETINE HYDROCHLORIDE 30 MG: 30 CAPSULE, DELAYED RELEASE ORAL at 08:10

## 2022-11-18 RX ADMIN — PHENAZOPYRIDINE HYDROCHLORIDE 200 MG: 100 TABLET ORAL at 08:10

## 2022-11-18 RX ADMIN — PHENAZOPYRIDINE HYDROCHLORIDE 200 MG: 100 TABLET ORAL at 16:28

## 2022-11-18 RX ADMIN — OXYCODONE HYDROCHLORIDE 10 MG: 10 TABLET, FILM COATED, EXTENDED RELEASE ORAL at 08:10

## 2022-11-18 RX ADMIN — TAMSULOSIN HYDROCHLORIDE 0.4 MG: 0.4 CAPSULE ORAL at 16:28

## 2022-11-18 RX ADMIN — PHENAZOPYRIDINE HYDROCHLORIDE 200 MG: 100 TABLET ORAL at 12:39

## 2022-11-18 RX ADMIN — GABAPENTIN 300 MG: 300 CAPSULE ORAL at 21:26

## 2022-11-18 RX ADMIN — METOPROLOL SUCCINATE 100 MG: 50 TABLET, EXTENDED RELEASE ORAL at 08:10

## 2022-11-18 RX ADMIN — ARIPIPRAZOLE 2 MG: 2 TABLET ORAL at 08:10

## 2022-11-18 RX ADMIN — PANTOPRAZOLE SODIUM 40 MG: 40 TABLET, DELAYED RELEASE ORAL at 05:50

## 2022-11-18 RX ADMIN — OXYCODONE HYDROCHLORIDE 10 MG: 10 TABLET, FILM COATED, EXTENDED RELEASE ORAL at 21:26

## 2022-11-18 RX ADMIN — ISOSORBIDE MONONITRATE 30 MG: 30 TABLET, EXTENDED RELEASE ORAL at 08:10

## 2022-11-18 RX ADMIN — BIMATOPROST 1 DROP: 0.1 SOLUTION/ DROPS OPHTHALMIC at 21:25

## 2022-11-18 RX ADMIN — CEFTRIAXONE 1000 MG: 1 INJECTION, POWDER, FOR SOLUTION INTRAMUSCULAR; INTRAVENOUS at 17:45

## 2022-11-18 RX ADMIN — PREDNISONE 20 MG: 20 TABLET ORAL at 08:10

## 2022-11-18 RX ADMIN — SENNOSIDES 17.2 MG: 8.6 TABLET, FILM COATED ORAL at 08:10

## 2022-11-18 NOTE — ASSESSMENT & PLAN NOTE
This is a 38-year-old male with history of bladder cancer, bilateral nephrostomy tubes in place presented with hematuria for the past few days  Patient recently had exchange of nephrostomy tubes on 11/15/2022  Patient noted having hematuria from his right nephrostomy and also bloody discharge from his penile urethra  ·  Hemoglobin 7 3, 1 unit PRBC given  · CT scan revealed stable eccentric bladder wall thickening on the left involving left ureterovesicular junction compatible with known neoplasm, no hydronephrosis with nephrostomy tube catheter is in place, improved metastatic retroperitoneal lymphadenopathy, stable pleural metastasis with increased left pleural effusion and new and lymphadenopathy and adjacent epicardial fat  · Urology consulted, recommending to flush PCNs every shift, transfuse as needed, stating if patient becomes unstable would need IR evaluation  · Repeat CBC in a m

## 2022-11-18 NOTE — ASSESSMENT & PLAN NOTE
Lab Results   Component Value Date    EGFR 16 11/18/2022    EGFR 15 11/17/2022    EGFR 18 11/10/2022    CREATININE 3 37 (H) 11/18/2022    CREATININE 3 58 (H) 11/17/2022    CREATININE 3 11 (H) 11/10/2022   · Acute kidney injury on CKD stage 4  · Likely secondary to anemia due to hematuria  · Baseline creatinine high 2s low 3s  · Creatinine improved today from 3 58-3 37 continue to monitor  · Hold diuretics

## 2022-11-18 NOTE — PROGRESS NOTES
[address] [place] [address]  Progress Note - Rhonda Razo 1945, 76 y o  male MRN: [id number]  Unit/Bed#: [facility] 68 2 -01 Encounter: [id number]  Primary Care Provider: Star Soni MD   Date and time admitted to hospital: 11/17/2022  1:02 PM    * Hematuria  Assessment & Plan  This is a 24-year-old male with history of bladder cancer, bilateral nephrostomy tubes in place presented with hematuria for the past few days  Patient recently had exchange of nephrostomy tubes on 11/15/2022  Patient noted having hematuria from his right nephrostomy and also bloody discharge from his penile urethra     ·  Hemoglobin 7 3, 1 unit PRBC given  · CT scan revealed stable eccentric bladder wall thickening on the left involving left ureterovesicular junction compatible with known neoplasm, no hydronephrosis with nephrostomy tube catheter is in place, improved metastatic retroperitoneal lymphadenopathy, stable pleural metastasis with increased left pleural effusion and new and lymphadenopathy and adjacent epicardial fat  · Urology consulted, recommending to flush PCNs every shift, transfuse as needed, stating if patient becomes unstable would need IR evaluation  · Repeat CBC in a m     UTI (urinary tract infection)  Assessment & Plan  · Patient was recently treated outpatient for UTI with nitrofurantoin and Bactrim without improvement  · Urinalysis consistent with UTI  · Continue on ceftriaxone day 2  · Follow urine culture    Acute kidney injury superimposed on CKD [facility])  Assessment & Plan  Lab Results   Component Value Date    EGFR 16 11/18/2022    EGFR 15 11/17/2022    EGFR 18 11/10/2022    CREATININE 3 37 (H) 11/18/2022    CREATININE 3 58 (H) 11/17/2022    CREATININE 3 11 (H) 11/10/2022   · Acute kidney injury on CKD stage 4  · Likely secondary to anemia due to hematuria  · Baseline creatinine high 2s low 3s  · Creatinine improved today from 3 58-3 37 continue to monitor  · Hold diuretics    Hypertension with renal disease  Assessment & Plan  · Continue metoprolol  · Continue to hold furosemide in setting of acute kidney injury    Bladder carcinoma [facility])  Assessment & Plan  · Status post TURBT and BCG, bilateral nephrostomy tubes  · History of bladder cancer follows Oncology outpatient  · Patient had transfusion scheduled for 11/18/2022 this was deferred 1 week    Type 2 diabetes mellitus, with long-term current use of insulin [facility])  Assessment & Plan  Lab Results   Component Value Date    HGBA1C 7 2 (H) 10/20/2022       Recent Labs     11/17/22  2103 11/18/22  0713 11/18/22  1112   POCGLU 216* 128 304*       Blood Sugar Average: Last 72 hrs:  · (P) 216 will restart Lantus at a lower dose of 15 units HS   · Patient on 18 units at home and 10 units with meals  · Monitor Accu-Cheks, sliding scale for coverage    Coronary artery disease of native artery of native heart with stable angina pectoris (HCC)  Assessment & Plan  · Stable, denies any chest pain  · Hold aspirin in setting of hematuria  · Continue metoprolol and isosorbide    Continuous opioid dependence (HCC)  Assessment & Plan  · Maintained on OxyContin 10 mg q 12 hours and oxycodone 5 mg q 4 hours as needed    Hyperlipidemia  Assessment & Plan  · Continue ezetimibe        VTE Pharmacologic Prophylaxis: VTE Score: 6 High Risk (Score >/= 5) - Pharmacological DVT Prophylaxis Contraindicated  Sequential Compression Devices Ordered  Patient Centered Rounds: I performed bedside rounds with nursing staff today  Discussions with Specialists or Other Care Team Provider:  None    Education and Discussions with Family / Patient: Patient declined call to   Time Spent for Care: 30 minutes  More than 50% of total time spent on counseling and coordination of care as described above      Current Length of Stay: 1 day(s)  Current Patient Status: Inpatient   Certification Statement: The patient will continue to require additional inpatient hospital stay due to hematuria requiring possible transfusion and UTI requiring IV antibiotics   Discharge Plan: Anticipate discharge in 48 hrs to home with home services  Code Status: Level 1 - Full Code    Subjective:   Patient was seen lying in bed today  He reports a mild hematuria this morning, nursing staff reports magnolia urine but no gross hematuria  He denies any lightheadedness, dizziness, shortness of breath, chest pain, fever, chills, nausea, vomiting  Objective:     Vitals:   Temp (24hrs), Av 8 °F (36 6 °C), Min:97 7 °F (36 5 °C), Max:98 °F (36 7 °C)    Temp:  [97 7 °F (36 5 °C)-98 °F (36 7 °C)] 97 8 °F (36 6 °C)  HR:  [] 88  Resp:  [15-22] 20  BP: (126-136)/(74-80) 135/80  SpO2:  [93 %-100 %] 97 %  Body mass index is 25 02 kg/m²  Input and Output Summary (last 24 hours): Intake/Output Summary (Last 24 hours) at 2022 1523  Last data filed at 2022 0814  Gross per 24 hour   Intake 905 ml   Output 2200 ml   Net -1295 ml       Physical Exam:   Physical Exam  Vitals and nursing note reviewed  Constitutional:       Appearance: He is ill-appearing (Chronically)  HENT:      Head: Normocephalic and atraumatic  Eyes:      General: No scleral icterus  Cardiovascular:      Rate and Rhythm: Normal rate and regular rhythm  Pulmonary:      Effort: Pulmonary effort is normal       Breath sounds: Normal breath sounds  Abdominal:      General: Abdomen is flat  Bowel sounds are normal       Palpations: Abdomen is soft  Tenderness: There is abdominal tenderness (Left lower quadrant)  Genitourinary:     Comments: Nephrostomy tubes with yellow urine output  Musculoskeletal:      Right lower leg: No edema  Left lower leg: No edema  Skin:     General: Skin is warm and dry  Comments: Bilateral bandages covering nephrostomy tube sites   Neurological:      Mental Status: He is alert  Mental status is at baseline     Psychiatric:         Mood and Affect: Mood normal  Behavior: Behavior normal         Additional Data:     Labs:  Results from last 7 days   Lab Units 11/18/22  0531   WBC Thousand/uL 8 81   HEMOGLOBIN g/dL 8 3*   HEMATOCRIT % 25 6*   PLATELETS Thousands/uL 219   NEUTROS PCT % 70   LYMPHS PCT % 15   MONOS PCT % 12   EOS PCT % 1     Results from last 7 days   Lab Units 11/18/22  0531   SODIUM mmol/L 138   POTASSIUM mmol/L 5 2   CHLORIDE mmol/L 105   CO2 mmol/L 23   BUN mg/dL 50*   CREATININE mg/dL 3 37*   ANION GAP mmol/L 10   CALCIUM mg/dL 8 3   ALBUMIN g/dL 2 5*   TOTAL BILIRUBIN mg/dL 0 30   ALK PHOS U/L 110   ALT U/L 33   AST U/L 39   GLUCOSE RANDOM mg/dL 133     Results from last 7 days   Lab Units 11/17/22  1456   INR  1 03     Results from last 7 days   Lab Units 11/18/22  1112 11/18/22  0713 11/17/22  2103   POC GLUCOSE mg/dl 304* 128 216*               Lines/Drains:  Invasive Devices     Central Venous Catheter Line  Duration           Port A Cath Right Chest -- days          Peripheral Intravenous Line  Duration           Peripheral IV 11/17/22 Right Antecubital 1 day    Peripheral IV 11/17/22 Distal;Left;Upper;Ventral (anterior) Arm <1 day          Drain  Duration           Nephrostomy Left 10 2 Fr  3 days    Nephrostomy Right 10 2 Fr  3 days                Central Line:  Goal for removal: Will discontinue when meds requiring line are completed  Imaging: No pertinent imaging reviewed      Recent Cultures (last 7 days):         Last 24 Hours Medication List:   Current Facility-Administered Medications   Medication Dose Route Frequency Provider Last Rate   • acetaminophen  650 mg Oral Q6H PRN Mk Rodriges MD     • ALPRAZolam  0 5 mg Oral HS PRN Mk Rodriges MD     • ARIPiprazole  2 mg Oral Daily Mk Rodriges MD     • bimatoprost  1 drop Both Eyes HS Mk Rodriges MD     • calcitriol  0 25 mcg Oral Daily Mk Rodriges MD     • cefTRIAXone  1,000 mg Intravenous Q24H Mk Rodriges MD 1,000 mg (11/17/22 2149)   • DULoxetine  30 mg Oral Daily Marlena Edy Murphy MD     • ezetimibe  10 mg Oral Daily Lionel Lawson MD     • gabapentin  300 mg Oral HS Lionel Lawson MD     • insulin glargine  15 Units Subcutaneous HS Lorna Barteltt PA-C     • insulin lispro  1-5 Units Subcutaneous TID AC Lionel Lawson MD     • isosorbide mononitrate  30 mg Oral QAM Lionel Lawson MD     • metoprolol succinate  100 mg Oral Daily Lionel Lawson MD     • oxyCODONE  10 mg Oral Q12H [address] Lionel Lawson MD     • oxyCODONE  5 mg Oral Q4H PRN Lionel Lawson MD     • pantoprazole  40 mg Oral Early Morning Lionel Lawson MD     • phenazopyridine  200 mg Oral TID With Meals Lionel Lawson MD     • predniSONE  20 mg Oral Daily Lionel Lawson MD     • senna  2 tablet Oral BID Lionel Lawson MD     • tamsulosin  0 4 mg Oral Daily With July Jenkins MD          Today, Patient Was Seen By: Renae De Leon PA-C    **Please Note: This note may have been constructed using a voice recognition system  **

## 2022-11-18 NOTE — ASSESSMENT & PLAN NOTE
Lab Results   Component Value Date    HGBA1C 7 2 (H) 10/20/2022       Recent Labs     11/17/22  2103 11/18/22  0713 11/18/22  1112   POCGLU 216* 128 304*       Blood Sugar Average: Last 72 hrs:  · (P) 216 will restart Lantus at a lower dose of 15 units HS   · Patient on 18 units at home and 10 units with meals  · Monitor Accu-Cheks, sliding scale for coverage

## 2022-11-18 NOTE — PLAN OF CARE
Problem: Potential for Falls  Goal: Patient will remain free of falls  Description: INTERVENTIONS:  - Educate patient/family on patient safety including physical limitations  - Instruct patient to call for assistance with activity   - Consult OT/PT to assist with strengthening/mobility   - Keep Call bell within reach  - Keep bed low and locked with side rails adjusted as appropriate  - Keep care items and personal belongings within reach  - Initiate and maintain comfort rounds  - Make Fall Risk Sign visible to staff  - Offer Toileting every  Hours, in advance of need  - Initiate/Maintain alarm  - Obtain necessary fall risk management equipment:   - Apply yellow socks and bracelet for high fall risk patients  - Consider moving patient to room near nurses station  11/18/2022 0823 by Sampson Vaughn RN  Outcome: Progressing  11/18/2022 0823 by Sampson Vaughn RN  Outcome: Progressing     Problem: PAIN - ADULT  Goal: Verbalizes/displays adequate comfort level or baseline comfort level  Description: Interventions:  - Encourage patient to monitor pain and request assistance  - Assess pain using appropriate pain scale  - Administer analgesics based on type and severity of pain and evaluate response  - Implement non-pharmacological measures as appropriate and evaluate response  - Consider cultural and social influences on pain and pain management  - Notify physician/advanced practitioner if interventions unsuccessful or patient reports new pain  11/18/2022 0823 by Sampson Vaughn RN  Outcome: Progressing  11/18/2022 0823 by Sampson Vaughn RN  Outcome: Progressing     Problem: INFECTION - ADULT  Goal: Absence or prevention of progression during hospitalization  Description: INTERVENTIONS:  - Assess and monitor for signs and symptoms of infection  - Monitor lab/diagnostic results  - Monitor all insertion sites, i e  indwelling lines, tubes, and drains  - Monitor endotracheal if appropriate and nasal secretions for changes in amount and color  - [place] appropriate cooling/warming therapies per order  - Administer medications as ordered  - Instruct and encourage patient and family to use good hand hygiene technique  - Identify and instruct in appropriate isolation precautions for identified infection/condition  11/18/2022 0823 by Marquise Cifuentes RN  Outcome: Progressing  11/18/2022 0823 by Marquise Cifuentes RN  Outcome: Progressing  Goal: Absence of fever/infection during neutropenic period  Description: INTERVENTIONS:  - Monitor WBC    11/18/2022 0823 by Marquise Cifuentes RN  Outcome: Progressing  11/18/2022 0823 by Marquise Cifuentes RN  Outcome: Progressing     Problem: SAFETY ADULT  Goal: Patient will remain free of falls  Description: INTERVENTIONS:  - Educate patient/family on patient safety including physical limitations  - Instruct patient to call for assistance with activity   - Consult OT/PT to assist with strengthening/mobility   - Keep Call bell within reach  - Keep bed low and locked with side rails adjusted as appropriate  - Keep care items and personal belongings within reach  - Initiate and maintain comfort rounds  - Make Fall Risk Sign visible to staff  - Offer Toileting every  Hours, in advance of need  - Initiate/Maintain alarm  - Obtain necessary fall risk management equipment:   - Apply yellow socks and bracelet for high fall risk patients  - Consider moving patient to room near nurses station  11/18/2022 0823 by Marquise Cifuentes RN  Outcome: Progressing  11/18/2022 0823 by Marquise Cifuentes RN  Outcome: Progressing  Goal: Maintain or return to baseline ADL function  Description: INTERVENTIONS:  -  Assess patient's ability to carry out ADLs; assess patient's baseline for ADL function and identify physical deficits which impact ability to perform ADLs (bathing, care of mouth/teeth, toileting, grooming, dressing, etc )  - Assess/evaluate cause of self-care deficits   - Assess range of motion  - Assess patient's mobility; develop plan if impaired  - Assess patient's need for assistive devices and provide as appropriate  - Encourage maximum independence but intervene and supervise when necessary  - Involve family in performance of ADLs  - Assess for home care needs following discharge   - Consider OT consult to assist with ADL evaluation and planning for discharge  - Provide patient education as appropriate  11/18/2022 0823 by Babak Julien RN  Outcome: Progressing  11/18/2022 0823 by Babak Julien RN  Outcome: Progressing  Goal: Maintains/Returns to pre admission functional level  Description: INTERVENTIONS:  - Perform BMAT or MOVE assessment daily    - Set and communicate daily mobility goal to care team and patient/family/caregiver  - Collaborate with rehabilitation services on mobility goals if consulted  - Perform Range of Motion  times a day  - Reposition patient every  hours    - Dangle patient times a day  - Stand patient  times a day  - Ambulate patient times a day  - Out of bed to chair  times a day   - Out of bed for meals times a day  - Out of bed for toileting  - Record patient progress and toleration of activity level   11/18/2022 0823 by Babak Julien RN  Outcome: Progressing  11/18/2022 0823 by Babak Julien RN  Outcome: Progressing     Problem: DISCHARGE PLANNING  Goal: Discharge to home or other facility with appropriate resources  Description: INTERVENTIONS:  - Identify barriers to discharge w/patient and caregiver  - Arrange for needed discharge resources and transportation as appropriate  - Identify discharge learning needs (meds, wound care, etc )  - Arrange for interpretive services to assist at discharge as needed  - Refer to Case Management Department for coordinating discharge planning if the patient needs post-hospital services based on physician/advanced practitioner order or complex needs related to functional status, cognitive ability, or social support system  11/18/2022 [id number] by Jayne Gonzalez RN  Outcome: Progressing  11/18/2022 0823 by Jayne Gonzalez RN  Outcome: Progressing     Problem: Knowledge Deficit  Goal: Patient/family/caregiver demonstrates understanding of disease process, treatment plan, medications, and discharge instructions  Description: Complete learning assessment and assess knowledge base    Interventions:  - Provide teaching at level of understanding  - Provide teaching via preferred learning methods  11/18/2022 0823 by Jayne Gonzalez RN  Outcome: Progressing  11/18/2022 0823 by Jayne Gonzalez RN  Outcome: Progressing

## 2022-11-18 NOTE — PLAN OF CARE
Problem: Potential for Falls  Goal: Patient will remain free of falls  Description: INTERVENTIONS:  - Educate patient/family on patient safety including physical limitations  - Instruct patient to call for assistance with activity   - Consult OT/PT to assist with strengthening/mobility   - Keep Call bell within reach  - Keep bed low and locked with side rails adjusted as appropriate  - Keep care items and personal belongings within reach  - Initiate and maintain comfort rounds  - Make Fall Risk Sign visible to staff  - Offer Toileting every 2 Hours, in advance of need  - Initiate/Maintain 2alarm  - Obtain necessary fall risk management equipment: 2  - Apply yellow socks and bracelet for high fall risk patients  - Consider moving patient to room near nurses station  Outcome: Progressing     Problem: PAIN - ADULT  Goal: Verbalizes/displays adequate comfort level or baseline comfort level  Description: Interventions:  - Encourage patient to monitor pain and request assistance  - Assess pain using appropriate pain scale  - Administer analgesics based on type and severity of pain and evaluate response  - Implement non-pharmacological measures as appropriate and evaluate response  - Consider cultural and social influences on pain and pain management  - Notify physician/advanced practitioner if interventions unsuccessful or patient reports new pain  Outcome: Progressing     Problem: INFECTION - ADULT  Goal: Absence or prevention of progression during hospitalization  Description: INTERVENTIONS:  - Assess and monitor for signs and symptoms of infection  - Monitor lab/diagnostic results  - Monitor all insertion sites, i e  indwelling lines, tubes, and drains  - Monitor endotracheal if appropriate and nasal secretions for changes in amount and color  - [place] appropriate cooling/warming therapies per order  - Administer medications as ordered  - Instruct and encourage patient and family to use good hand hygiene technique  - Identify and instruct in appropriate isolation precautions for identified infection/condition  Outcome: Progressing  Goal: Absence of fever/infection during neutropenic period  Description: INTERVENTIONS:  - Monitor WBC    Outcome: Progressing     Problem: SAFETY ADULT  Goal: Patient will remain free of falls  Description: INTERVENTIONS:  - Educate patient/family on patient safety including physical limitations  - Instruct patient to call for assistance with activity   - Consult OT/PT to assist with strengthening/mobility   - Keep Call bell within reach  - Keep bed low and locked with side rails adjusted as appropriate  - Keep care items and personal belongings within reach  - Initiate and maintain comfort rounds  - Make Fall Risk Sign visible to staff  - Offer Toileting every 2 Hours, in advance of need  - Initiate/Maintain 2alarm  - Obtain necessary fall risk management equipment: 2  - Apply yellow socks and bracelet for high fall risk patients  - Consider moving patient to room near nurses station  Outcome: Progressing  Goal: Maintain or return to baseline ADL function  Description: INTERVENTIONS:  -  Assess patient's ability to carry out ADLs; assess patient's baseline for ADL function and identify physical deficits which impact ability to perform ADLs (bathing, care of mouth/teeth, toileting, grooming, dressing, etc )  - Assess/evaluate cause of self-care deficits   - Assess range of motion  - Assess patient's mobility; develop plan if impaired  - Assess patient's need for assistive devices and provide as appropriate  - Encourage maximum independence but intervene and supervise when necessary  - Involve family in performance of ADLs  - Assess for home care needs following discharge   - Consider OT consult to assist with ADL evaluation and planning for discharge  - Provide patient education as appropriate  Outcome: Progressing  Goal: Maintains/Returns to pre admission functional level  Description: INTERVENTIONS:  - Perform BMAT or MOVE assessment daily    - Set and communicate daily mobility goal to care team and patient/family/caregiver  - Collaborate with rehabilitation services on mobility goals if consulted  - Perform Range of Motion 2 times a day  - Reposition patient every 2 hours  - Dangle patient 2 times a day  - Stand patient 2 times a day  - Ambulate patient 2 times a day  - Out of bed to chair 2 times a day   - Out of bed for meals 2 times a day  - Out of bed for toileting  - Record patient progress and toleration of activity level   Outcome: Progressing     Problem: DISCHARGE PLANNING  Goal: Discharge to home or other facility with appropriate resources  Description: INTERVENTIONS:  - Identify barriers to discharge w/patient and caregiver  - Arrange for needed discharge resources and transportation as appropriate  - Identify discharge learning needs (meds, wound care, etc )  - Arrange for interpretive services to assist at discharge as needed  - Refer to Case Management Department for coordinating discharge planning if the patient needs post-hospital services based on physician/advanced practitioner order or complex needs related to functional status, cognitive ability, or social support system  Outcome: Progressing     Problem: Knowledge Deficit  Goal: Patient/family/caregiver demonstrates understanding of disease process, treatment plan, medications, and discharge instructions  Description: Complete learning assessment and assess knowledge base    Interventions:  - Provide teaching at level of understanding  - Provide teaching via preferred learning methods  Outcome: Progressing

## 2022-11-18 NOTE — ASSESSMENT & PLAN NOTE
· Status post TURBT and BCG, bilateral nephrostomy tubes  · History of bladder cancer follows Oncology outpatient  · Patient had transfusion scheduled for 11/18/2022 this was deferred 1 week

## 2022-11-19 VITALS
DIASTOLIC BLOOD PRESSURE: 84 MMHG | SYSTOLIC BLOOD PRESSURE: 139 MMHG | TEMPERATURE: 97.8 F | HEART RATE: 79 BPM | RESPIRATION RATE: 16 BRPM | OXYGEN SATURATION: 90 % | BODY MASS INDEX: 24.7 KG/M2 | HEIGHT: 76 IN | WEIGHT: 202.82 LBS

## 2022-11-19 LAB
ANION GAP SERPL CALCULATED.3IONS-SCNC: 8 MMOL/L (ref 4–13)
BACTERIA UR CULT: ABNORMAL
BUN SERPL-MCNC: 46 MG/DL (ref 5–25)
CALCIUM SERPL-MCNC: 8.8 MG/DL (ref 8.3–10.1)
CHLORIDE SERPL-SCNC: 104 MMOL/L (ref 96–108)
CO2 SERPL-SCNC: 25 MMOL/L (ref 21–32)
CREAT SERPL-MCNC: 3.03 MG/DL (ref 0.6–1.3)
ERYTHROCYTE [DISTWIDTH] IN BLOOD BY AUTOMATED COUNT: 17.2 % (ref 11.6–15.1)
GFR SERPL CREATININE-BSD FRML MDRD: 18 ML/MIN/1.73SQ M
GLUCOSE SERPL-MCNC: 145 MG/DL (ref 65–140)
GLUCOSE SERPL-MCNC: 170 MG/DL (ref 65–140)
HCT VFR BLD AUTO: 26.7 % (ref 36.5–49.3)
HGB BLD-MCNC: 8.8 G/DL (ref 12–17)
MCH RBC QN AUTO: 30.9 PG (ref 26.8–34.3)
MCHC RBC AUTO-ENTMCNC: 33 G/DL (ref 31.4–37.4)
MCV RBC AUTO: 94 FL (ref 82–98)
PLATELET # BLD AUTO: 219 THOUSANDS/UL (ref 149–390)
PMV BLD AUTO: 9.3 FL (ref 8.9–12.7)
POTASSIUM SERPL-SCNC: 4.7 MMOL/L (ref 3.5–5.3)
RBC # BLD AUTO: 2.85 MILLION/UL (ref 3.88–5.62)
SODIUM SERPL-SCNC: 137 MMOL/L (ref 135–147)
WBC # BLD AUTO: 10.13 THOUSAND/UL (ref 4.31–10.16)

## 2022-11-19 RX ORDER — FLUTICASONE PROPIONATE 50 MCG
1 SPRAY, SUSPENSION (ML) NASAL AS NEEDED
Start: 2022-11-19

## 2022-11-19 RX ADMIN — OXYCODONE HYDROCHLORIDE 10 MG: 10 TABLET, FILM COATED, EXTENDED RELEASE ORAL at 08:02

## 2022-11-19 RX ADMIN — ISOSORBIDE MONONITRATE 30 MG: 30 TABLET, EXTENDED RELEASE ORAL at 08:02

## 2022-11-19 RX ADMIN — PHENAZOPYRIDINE HYDROCHLORIDE 200 MG: 100 TABLET ORAL at 08:03

## 2022-11-19 RX ADMIN — DULOXETINE HYDROCHLORIDE 30 MG: 30 CAPSULE, DELAYED RELEASE ORAL at 08:02

## 2022-11-19 RX ADMIN — METOPROLOL SUCCINATE 100 MG: 50 TABLET, EXTENDED RELEASE ORAL at 08:02

## 2022-11-19 RX ADMIN — CALCITRIOL 0.25 MCG: 0.25 CAPSULE, LIQUID FILLED ORAL at 08:04

## 2022-11-19 RX ADMIN — SENNOSIDES 17.2 MG: 8.6 TABLET, FILM COATED ORAL at 08:03

## 2022-11-19 RX ADMIN — EZETIMIBE 10 MG: 10 TABLET ORAL at 08:02

## 2022-11-19 RX ADMIN — PANTOPRAZOLE SODIUM 40 MG: 40 TABLET, DELAYED RELEASE ORAL at 05:48

## 2022-11-19 RX ADMIN — PREDNISONE 20 MG: 20 TABLET ORAL at 08:02

## 2022-11-19 RX ADMIN — ARIPIPRAZOLE 2 MG: 2 TABLET ORAL at 08:02

## 2022-11-19 NOTE — ASSESSMENT & PLAN NOTE
· Patient was recently treated outpatient for UTI with nitrofurantoin and Bactrim without improvement  · Urine culture growing only 10,000 Enterococcus, 2nd urine culture without growth

## 2022-11-19 NOTE — PLAN OF CARE
Problem: Potential for Falls  Goal: Patient will remain free of falls  Description: INTERVENTIONS:  - Educate patient/family on patient safety including physical limitations  - Instruct patient to call for assistance with activity   - Consult OT/PT to assist with strengthening/mobility   - Keep Call bell within reach  - Keep bed low and locked with side rails adjusted as appropriate  - Keep care items and personal belongings within reach  - Initiate and maintain comfort rounds  - Make Fall Risk Sign visible to staff  - Offer Toileting every  Hours, in advance of need  - Initiate/Maintain alarm  - Obtain necessary fall risk management equipment:   - Apply yellow socks and bracelet for high fall risk patients  - Consider moving patient to room near nurses station  Outcome: Progressing     Problem: PAIN - ADULT  Goal: Verbalizes/displays adequate comfort level or baseline comfort level  Description: Interventions:  - Encourage patient to monitor pain and request assistance  - Assess pain using appropriate pain scale  - Administer analgesics based on type and severity of pain and evaluate response  - Implement non-pharmacological measures as appropriate and evaluate response  - Consider cultural and social influences on pain and pain management  - Notify physician/advanced practitioner if interventions unsuccessful or patient reports new pain  Outcome: Progressing     Problem: INFECTION - ADULT  Goal: Absence or prevention of progression during hospitalization  Description: INTERVENTIONS:  - Assess and monitor for signs and symptoms of infection  - Monitor lab/diagnostic results  - Monitor all insertion sites, i e  indwelling lines, tubes, and drains  - Monitor endotracheal if appropriate and nasal secretions for changes in amount and color  - [place] appropriate cooling/warming therapies per order  - Administer medications as ordered  - Instruct and encourage patient and family to use good hand hygiene technique  - Identify and instruct in appropriate isolation precautions for identified infection/condition  Outcome: Progressing  Goal: Absence of fever/infection during neutropenic period  Description: INTERVENTIONS:  - Monitor WBC    Outcome: Progressing     Problem: SAFETY ADULT  Goal: Patient will remain free of falls  Description: INTERVENTIONS:  - Educate patient/family on patient safety including physical limitations  - Instruct patient to call for assistance with activity   - Consult OT/PT to assist with strengthening/mobility   - Keep Call bell within reach  - Keep bed low and locked with side rails adjusted as appropriate  - Keep care items and personal belongings within reach  - Initiate and maintain comfort rounds  - Make Fall Risk Sign visible to staff  - Offer Toileting every  Hours, in advance of need  - Initiate/Maintain alarm  - Obtain necessary fall risk management equipment:   - Apply yellow socks and bracelet for high fall risk patients  - Consider moving patient to room near nurses station  Outcome: Progressing  Goal: Maintain or return to baseline ADL function  Description: INTERVENTIONS:  -  Assess patient's ability to carry out ADLs; assess patient's baseline for ADL function and identify physical deficits which impact ability to perform ADLs (bathing, care of mouth/teeth, toileting, grooming, dressing, etc )  - Assess/evaluate cause of self-care deficits   - Assess range of motion  - Assess patient's mobility; develop plan if impaired  - Assess patient's need for assistive devices and provide as appropriate  - Encourage maximum independence but intervene and supervise when necessary  - Involve family in performance of ADLs  - Assess for home care needs following discharge   - Consider OT consult to assist with ADL evaluation and planning for discharge  - Provide patient education as appropriate  Outcome: Progressing  Goal: Maintains/Returns to pre admission functional level  Description: INTERVENTIONS:  - Perform BMAT or MOVE assessment daily    - Set and communicate daily mobility goal to care team and patient/family/caregiver  - Collaborate with rehabilitation services on mobility goals if consulted  - Perform Range of Motion  times a day  - Reposition patient every  hours  - Dangle patient  times a day  - Stand patient  times a day  - Ambulate patient  times a day  - Out of bed to chair  times a day   - Out of bed for me times a day  - Out of bed for toileting  - Record patient progress and toleration of activity level   Outcome: Progressing     Problem: DISCHARGE PLANNING  Goal: Discharge to home or other facility with appropriate resources  Description: INTERVENTIONS:  - Identify barriers to discharge w/patient and caregiver  - Arrange for needed discharge resources and transportation as appropriate  - Identify discharge learning needs (meds, wound care, etc )  - Arrange for interpretive services to assist at discharge as needed  - Refer to Case Management Department for coordinating discharge planning if the patient needs post-hospital services based on physician/advanced practitioner order or complex needs related to functional status, cognitive ability, or social support system  Outcome: Progressing     Problem: Knowledge Deficit  Goal: Patient/family/caregiver demonstrates understanding of disease process, treatment plan, medications, and discharge instructions  Description: Complete learning assessment and assess knowledge base    Interventions:  - Provide teaching at level of understanding  - Provide teaching via preferred learning methods  Outcome: Progressing

## 2022-11-19 NOTE — ASSESSMENT & PLAN NOTE
· Status post TURBT and BCG, bilateral nephrostomy tubes  · History of bladder cancer follows Oncology outpatient  · Patient had transfusion scheduled for 11/18/2022 this was deferred 1 week-will need close follow-up with his oncologist

## 2022-11-19 NOTE — ASSESSMENT & PLAN NOTE
This is a 68-year-old male with history of bladder cancer, bilateral nephrostomy tubes in place presented with hematuria for the past few days  Patient recently had exchange of nephrostomy tubes on 11/15/2022  Patient noted having hematuria from his right nephrostomy and also bloody discharge from his penile urethra     ·  Hemoglobin 7 3, 1 unit PRBC given during hospitalization with improvement in hemoglobin to 8 8  · CT scan revealed stable eccentric bladder wall thickening on the left involving left ureterovesicular junction compatible with known neoplasm, no hydronephrosis with nephrostomy tube catheter is in place, improved metastatic retroperitoneal lymphadenopathy, stable pleural metastasis with increased left pleural effusion and new and lymphadenopathy and adjacent epicardial fat  · Urology consulted, recommending to flush PCNs every shift  · Hematuria has resolved  · Hemoglobin has remained stable during hospitalization  · Plan for discharge today with outpatient Urology and Oncology follow-up

## 2022-11-19 NOTE — DISCHARGE SUMMARY
[address] [facility]  Discharge- Paris Cabrales 1945, 76 y o  male MRN: [id number]  Unit/Bed#: Debora Urbina [facility] 87 216-01 Encounter: [id number]  Primary Care Provider: Lorenzo Goncalves MD   Date and time admitted to hospital: 11/17/2022  1:02 PM    * Hematuria  Assessment & Plan  This is a 68-year-old male with history of bladder cancer, bilateral nephrostomy tubes in place presented with hematuria for the past few days  Patient recently had exchange of nephrostomy tubes on 11/15/2022  Patient noted having hematuria from his right nephrostomy and also bloody discharge from his penile urethra     ·  Hemoglobin 7 3, 1 unit PRBC given during hospitalization with improvement in hemoglobin to 8 8  · CT scan revealed stable eccentric bladder wall thickening on the left involving left ureterovesicular junction compatible with known neoplasm, no hydronephrosis with nephrostomy tube catheter is in place, improved metastatic retroperitoneal lymphadenopathy, stable pleural metastasis with increased left pleural effusion and new and lymphadenopathy and adjacent epicardial fat  · Urology consulted, recommending to flush PCNs every shift  · Hematuria has resolved  · Hemoglobin has remained stable during hospitalization  · Plan for discharge today with outpatient Urology and Oncology follow-up    UTI (urinary tract infection)  Assessment & Plan  · Patient was recently treated outpatient for UTI with nitrofurantoin and Bactrim without improvement  · Urine culture growing only 10,000 Enterococcus, 2nd urine culture without growth     Recurrent pleural effusion  Assessment & Plan  · Patient has left tunneled pleural catheter according to him and his wife has been draining this more frequently  · Follows pulmonary outpatient    Continuous opioid dependence ([facility] )  Assessment & Plan  · Maintained on OxyContin 10 mg q 12 hours and oxycodone 5 mg q 4 hours as needed    Acute kidney injury superimposed on CKD [facility])  Assessment & Plan  Lab Results   Component Value Date    EGFR 18 11/19/2022    EGFR 16 11/18/2022    EGFR 15 11/17/2022    CREATININE 3 03 (H) 11/19/2022    CREATININE 3 37 (H) 11/18/2022    CREATININE 3 58 (H) 11/17/2022     · Acute kidney injury on CKD stage 4  · Likely secondary to anemia due to hematuria  · Baseline creatinine high 2s low 3s  · Creatinine back to baseline at 3 03  · Continue outpatient monitoring    Type 2 diabetes mellitus, with long-term current use of insulin [facility])  Assessment & Plan  Lab Results   Component Value Date    HGBA1C 7 2 (H) 10/20/2022       Recent Labs     11/18/22  1112 11/18/22  1555 11/18/22  2108 11/19/22  0730   POCGLU 304* 325* 273* 145*       Blood Sugar Average: Last 72 hrs:  · (P) 010 [id number] will restart Lantus at a lower dose of 15 units HS   · Patient on 18 units at home and 10 units with meals  · Monitor Accu-Cheks, sliding scale for coverage  · Resume home regimen at discharge    Hyperlipidemia  Assessment & Plan  · Continue ezetimibe    Hypertension with renal disease  Assessment & Plan  · Continue metoprolol  · Resume Lasix at discharge    Bladder carcinoma [facility])  Assessment & Plan  · Status post TURBT and BCG, bilateral nephrostomy tubes  · History of bladder cancer follows Oncology outpatient  · Patient had transfusion scheduled for 11/18/2022 this was deferred 1 week-will need close follow-up with his oncologist    Coronary artery disease of native artery of native heart with stable angina pectoris ([address] )  Assessment & Plan  · Stable, denies any chest pain  · Resume aspirin at discharge  · Continue metoprolol and isosorbide      Medical Problems     Resolved Problems  Date Reviewed: 11/19/2022   None       Discharging Physician / Practitioner: Jessica Pierre PA-C  PCP: Aniket Miller MD  Admission Date:   Admission Orders (From admission, onward)     Ordered        11/17/22 1634  INPATIENT ADMISSION  Once                      Discharge Date: 11/19/22    Consultations During Hospital Stay:  · Urology    Procedures Performed:   CT abdomen pelvis wo contrast    Result Date: 11/17/2022  Impression: 1  Stable eccentric bladder wall thickening on the left involving the left ureterovesical junction compatible with known neoplasm  2   No hydronephrosis with nephrostomy catheters are in place  Unchanged left hydroureter  3   Improved metastatic retroperitoneal lymphadenopathy  4   Grossly stable pleural metastasis with increased left pleural effusion and new lymphadenopathy in the adjacent epicardial fat  Evaluation is limited without intravenous contrast  Workstation performed: [id number]     XR chest 2 views    Result Date: 11/17/2022  Impression: Trace left pleural effusion  Workstation performed: [id number]       Significant Findings / Test Results:   · See above    Incidental Findings:   · None     Test Results Pending at Discharge (will require follow up): · None     Outpatient Tests Requested:  · Outpatient urology and oncology follow-up    Complications:  None    Reason for Admission:  Hematuria    Hospital Course:   Manuel Izaguirre is a 76 y o  male patient who originally presented to the hospital on 11/17/2022 due to hematuria  Patient presented after bilateral nephrostomy tube exchange with hematuria  Was found to have low hemoglobin and received 1 unit PRBCs during hospitalization  He was seen in consult by Urology  Hematuria resolved and hemoglobin remained stable after transfusion  Patient was discharged with plan to follow up with Urology and Oncology outpatient  Please see above list of diagnoses and related plan for additional information  Condition at Discharge: stable    Discharge Day Visit / Exam:   Subjective:  Patient seen and examined at bedside  Reports he is doing well today  Hematuria has improved during hospitalization  Feels comfortable returning home today with outpatient follow-up    Vitals: Blood Pressure: 139/84 (11/19/22 0734)  Pulse: 79 (11/19/22 0734)  Temperature: 97 8 °F (36 6 °C) (11/19/22 0734)  Temp Source: Oral (11/17/22 2105)  Respirations: 16 (11/19/22 0734)  Height: 6' 3 5" (191 8 cm) (11/17/22 1744)  Weight - Scale: 92 kg (202 lb 13 2 oz) (11/17/22 1744)  SpO2: 90 % (11/19/22 0734)  Exam:   Physical Exam  Vitals reviewed  Constitutional:       General: He is not in acute distress  HENT:      Head: Normocephalic and atraumatic  Eyes:      General: No scleral icterus  Conjunctiva/sclera: Conjunctivae normal    Cardiovascular:      Rate and Rhythm: Normal rate and regular rhythm  Heart sounds: No murmur heard  Pulmonary:      Effort: Pulmonary effort is normal  No respiratory distress  Breath sounds: Normal breath sounds  Abdominal:      General: Bowel sounds are normal  There is no distension  Palpations: Abdomen is soft  Tenderness: There is no abdominal tenderness  Genitourinary:     Comments: Bilateral nephrostomy tubes draining yellow urine  Musculoskeletal:      Cervical back: Neck supple  Right lower leg: No edema  Left lower leg: No edema  Skin:     General: Skin is warm and dry  Neurological:      Mental Status: He is alert and oriented to person, place, and time  Psychiatric:         Mood and Affect: Mood normal          Behavior: Behavior normal             Discharge instructions/Information to patient and family:   See after visit summary for information provided to patient and family  Provisions for Follow-Up Care:  See after visit summary for information related to follow-up care and any pertinent home health orders  Disposition:   Home    Planned Readmission:  None     Discharge Statement:  I spent 35 minutes discharging the patient  This time was spent on the day of discharge  I had direct contact with the patient on the day of discharge   Greater than 50% of the total time was spent examining patient, answering all patient questions, arranging and discussing plan of care with patient as well as directly providing post-discharge instructions  Additional time then spent on discharge activities  Discharge Medications:  See after visit summary for reconciled discharge medications provided to patient and/or family        **Please Note: This note may have been constructed using a voice recognition system**

## 2022-11-19 NOTE — ASSESSMENT & PLAN NOTE
Lab Results   Component Value Date    EGFR 18 11/19/2022    EGFR 16 11/18/2022    EGFR 15 11/17/2022    CREATININE 3 03 (H) 11/19/2022    CREATININE 3 37 (H) 11/18/2022    CREATININE 3 58 (H) 11/17/2022     · Acute kidney injury on CKD stage 4  · Likely secondary to anemia due to hematuria  · Baseline creatinine high 2s low 3s  · Creatinine back to baseline at 3 03  · Continue outpatient monitoring

## 2022-11-19 NOTE — CASE MANAGEMENT
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[facility] ) 2022   • UTI (urinary tract infection) 2022   • Shortness of breath 2022   • History of tobacco use disorder 2022   • Retroperitoneal lymphadenopathy 2022   • Recurrent pleural effusion 2022   • Pulmonary nodules 2022   • Syncope and collapse 2022   • Depression with suicidal ideation 2022   • Cancer related pain 2022   • Bilateral hydronephrosis 04/10/2022   • CKD (chronic kidney disease) 2022   • Fall 2022   • Hyperkalemia 2022   • Retained ureteral stent    • 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[facility] ) 2022   • Embolism and 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 ([facility] ) 2022   • Abnormal CT scan, bladder 2022   • Port-A-Cath in place 2022   • Continuous opioid dependence ([facility] ) 2021   • Hematuria 10/24/2021   • Acute urinary retention 10/21/2021   • Chronic pain syndrome 2021   • Lumbar spondylosis    • Chronic bronchitis ([facility] ) 2021   • Moderate major depression, single episode ([facility] ) 2021   • Thrombocytopenia ([facility] ) 2021   • 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 2021   • Gross hematuria 2021   • PAD (peripheral artery disease) (HCC)    • Left carotid bruit    • Anemia of chronic disease 2020   • Preoperative clearance 2020   • Chronic anemia 10/10/2020   • Diabetic ulcer of left foot associated with type 2 diabetes mellitus, 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 ([facility] ) 10/08/2020   • Acute kidney injury superimposed on CKD [facility])    • Dysuria 2020   • Diabetic polyneuropathy associated with type 2 diabetes mellitus ([facility] ) 07/16/2020   • Abnormal MRI, lumbar spine 06/29/2020   • Malignant neoplasm of anterior wall of urinary bladder ([facility] [address] )    • Acute renal failure (ARF) ([address] ) 02/12/2020   • Secondary renal hyperparathyroidism ([facility] [address] ) 02/12/2020   • Preop examination 04/16/2019   • Superficial phlebitis 03/07/2019   • Arteriosclerosis of artery of extremity ([facility] ) 02/22/2019   • Stable angina pectoris ([facility] [address] ) 02/22/2019   • Herpes zoster without complication 75/21/8393   • Localized edema 02/22/2019   • Back pain 01/31/2019   • Degeneration of lumbar intervertebral disc 12/03/2018   • Primary osteoarthritis of left knee 03/16/2018   • Bladder carcinoma ([facility] [address] ) 08/16/2016   • Presence of stent in coronary artery 08/16/2016   • Hypertension with renal disease 10/29/2015   • Hyperlipidemia 10/29/2015   • Cystitis due to intravesical BCG administration 09/24/2015   • Coronary artery disease of native artery of native heart with stable angina pectoris ([facility] ) 05/19/2011   • Type 2 diabetes mellitus, with long-term current use of insulin ([facility] ) 01/09/2004      LOS (days): 2  Geometric Mean LOS (GMLOS) (days): 3 10  Days to GMLOS:1 4     OBJECTIVE:  Risk of Unplanned Readmission Score: 50 36         Current admission status: Inpatient   Preferred Pharmacy:   [facility] [address], [address] [place] [id number]  Phone: [phone] Fax: [phone]    Primary Care Provider: Qasim Franklin MD    Primary Insurance: MEDICARE  Secondary Insurance: BLUE CROSS    DISCHARGE DETAILS:    Discharge planning discussed with[de-identified] Patient  Freedom of Choice: Yes  Comments - Freedom of Choice: Patient would like to go home, reports no concerns taking care of his nephrostomy tubes as he had them prior to his admission  Patient to f/u OP after dc                       Requested [address]         Is the patient interested in [facility] at discharge?: No    DME Referral Provided  Referral made for DME?: No    Other Referral/Resources/Interventions Provided:  Interventions: None Indicated         Treatment Team Recommendation: Home  Discharge Destination Plan[de-identified] Home  Transport at Discharge : Auto with designated            ETA of Transport (Date): 11/19/22           Accompanied by: Family member (Wife)

## 2022-11-19 NOTE — ASSESSMENT & PLAN NOTE
· Stable, denies any chest pain  · Resume aspirin at discharge  · Continue metoprolol and isosorbide

## 2022-11-19 NOTE — ASSESSMENT & PLAN NOTE
Lab Results   Component Value Date    HGBA1C 7 2 (H) 10/20/2022       Recent Labs     11/18/22  1112 11/18/22  1555 11/18/22  2108 11/19/22  0730   POCGLU 304* 325* 273* 145*       Blood Sugar Average: Last 72 hrs:  · (P) 871 [id number] will restart Lantus at a lower dose of 15 units HS   · Patient on 18 units at home and 10 units with meals  · Monitor Accu-Cheks, sliding scale for coverage  · Resume home regimen at discharge

## 2022-11-21 ENCOUNTER — TELEPHONE (OUTPATIENT)
Dept: HEMATOLOGY ONCOLOGY | Facility: CLINIC | Age: 77
End: 2022-11-21

## 2022-11-21 ENCOUNTER — TRANSITIONAL CARE MANAGEMENT (OUTPATIENT)
Dept: INTERNAL MEDICINE CLINIC | Facility: CLINIC | Age: 77
End: 2022-11-21

## 2022-11-21 NOTE — TELEPHONE ENCOUNTER
Patient calling in returning my voicemail that I left for his granddaughter  Let patient know that Dr Ольга Munguia wanted to see him back in our office within the next two weeks  Let patient know there was an opening for tomorrow afternoon at 2:40  Patient agreed to appointment time and will be coming into the office tomorrow at 2:40

## 2022-11-21 NOTE — PROGRESS NOTES
Hematology/Oncology Outpatient Office Note    Date of Service: 2022    [facility] HEMATOLOGY ONCOLOGY SPECIALISTS CELSO Castillonorman  [facility]  [phone]    Reason for Consultation:   Chief Complaint   Patient presents with   • Follow-up     Cancer Stage at diagnosis: II, interval progression to Stage IV    Primary Care Physician:  Star Soni MD     Nickname: Denae Bass    Spouse: Geri Cabot    Granddaughter: Granddaughter, Azul Blanco     Original ECO    Today's ECO    Goals and Barriers:  Current Goal: Minimize effects of disease burden, extend life  Barriers to accomplishing this: persistent fatigue and weakness, depression, anxiety, worry, L foot claudication, lower back pain from spinal stenosis  Has been requiring walkers/canes when he outside of home  Patient's Capacity to Self Care:  Patient is able to self care    Code Status: Full code    Advanced directives: not on file but I recommended he get that done    ASSESSMENT & PLAN      Diagnosis ICD-10-CM Associated Orders   1  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)  C67 3         This is a 76 y o  c PMHx notable for spinal stenosis, Gastric bypass (), 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, diabetic nephropathy, and renal vascular disease, DM, being seen as follow-up for his bladder cancer  From an overall performance status basis, patient can tolerate systemic treatment due to an ECOG PS of 2  Thoughts on approach to systemic therapy in the first line and subsequent lines of therapy:    Preferred first line would have been cisplatin/gemcitabine however patient's overall performance status and kidney function were not amenable to this      Thoughts on prediction for Grade III-V toxicity in elderly patients to systemic chemotherapy:  Age >72 years   GI/ cancers  Falls in last 6 months   Hearing impairment (b/l hearing aids)  Limited ability to walk 1 block  Requires assistance with medications  Decreased social activities   [facility]  BMI<18 5 or moderate or severe weight loss or decrease in food intake in past 3 months  Mild vs moderate dementia/depression     The above 5 factors predict toxicity for the patient if he undergoes systemic chemotherapy and they were discussed with the patient  Discussion of disease response monitoring: We discussed with the patient at length the role of imaging and potential blood monitoring of burden of disease  We will opt to get CT chest, abdomen, pelvis without contrast due to kidney function as surveillance during and following therapy  As there was been progression of disease, liquid biopsy was performed to assess actionable biomarkers and determine bio-marker status  1/27/22 pt only got 1/2 of Days 1-5 5-FU due to contents spilling  Pt completed concurrent 5-FU/MTC + RT and found to have POD on 6/28/2022 PET/CT (lung mets) and had 2 cycles of q6 week Keytruda before POD  Decision made to transition to second line therapy for increased objective response and time to response due to significant burden of disease  During pt's office visit on 9/2/22, decision was made to start the patient on enfortumab vedotin due to progression of disease even on Keytruda; risks and benefits of the therapy was discussed with the patient and informed consent was signed to proceed with enfortumab  Patient did require 1 more round of thoracentesis for rapid accumulation of pleural fluid causing shortness of breath on 9/14/22; 2L of fluid was removed  Patient is scheduled for the 1st infusion to start today, 09/16/2022  Discussion of decision making  • Oncology history updated, accordingly, during this visit  • Goals of care/patient communication  o I discussed with the patient the clinical course leading up to their cancer diagnosis   I reviewed relevant office notes, imaging reports and pathology result as well   o I told the patient that this is a case of incurable disease and what this means  We discussed that the goal of anti-cancer therapy is to provide best quality of life, extend overall survival, and progression free survival as shown in clinical trials  We also discussed that there might be a point when the cancer will no longer respond to this anti-neoplastic therapy and thus he is seeing palliative care    o I explained the risks/benefits of the proposed cancer therapy: Enfortumab and after discussion including understanding risks of possible life-threatening complications and therapy-related malignancy development, informed consent for treatment has been signed  • TNM/Staging At Diagnosis  Cancer Staging  Malignant neoplasm of anterior wall of urinary bladder ([facility] [address] )  Staging form: Urinary Bladder, AJCC 8th Edition  - Clinical stage from 10/18/2021: Stage II (cT2, cN0, cM0) - Signed by Ariella Li MD on 11/17/2021  Stage prefix: Initial diagnosis  Clinical staging from January 2022: Stage II has progressed to Stage IV bladder cancer given retroperitoneal lymphadenopathy and concern for metastasis  • Disease Features/Tumor Markers/Genetics  o Tumor Marker: n/a  o Notable Path Features: anterior wall Invasive high-grade urothelial carcinoma  Carcinoma invades muscularis propria (detrusor muscle)   Guardant 360 showing CDK4 amplification, TP53 mutation  • Current Treatment:  Enfortumab  • Other Supportive care:   • Treatment Team Members  o Surgeon: Dr Liz Lomeli  o Rad Onc: Dr Jeanine Bautista  o Palliative: April William Loge  o Nephrology: Dr Kanwal Fu  • Labs: creat 2 64 (eGFR 22), normocytic anemia, Hgb 9 4  • Diagnostics: 11/11/21 CT CAP w/o c: 1  No metastatic disease within the limitations of noncontrast technique in the chest abdomen or pelvis  2   Diffuse hemicircumferential smooth bladder wall thickening on the left, similar to the prior study when allowing for differences in bladder distention   Prostatomegaly  6/3/2022 CT CAP w/o c: Development of a few pulmonary nodules, the largest measuring 8 mm  A PET CT would be recommended  Worsening wall thickening of the urinary bladder which may represent post treatment changes  Cannot exclude cystitis or progression of cancer  6/28/2022 PET/CT: read pending, per my assessment lung metastatic hypermetabolic nodules as well as L chest wall nodule  8/26/2022 CT CAP w/o c: Pt with new large L pleural effusion contributing to his pleuritic CP  There is now bulky retroperitoneal lymphadenopathy, not seen on the prior study  The largest lymph node measures approximately 3 2 x 2 7 cm and is located to the left of the aorta concerning for metastatic disease  There is a lobulated 1 cm nodule in the left upper lobe on series 3, image 85 not seen on the prior study  There is a 7 mm nodule in the right middle lobe, increased in size since prior study with surrounding groundglass density, nonspecific  Stable 2 mm nodule right middle lobe series 3, image 85  Previously noted nodule adjacent to the major fissure in the lingula is obscured by patchy airspace consolidation  8/30/2022 NM Bone scan: No scintigraphic evidence of osseous metastasis  Port is without acute issues  10/4/2022: CT CAP w/o c: Stable disease after 1 cycle of treatment, lymph nodes, improved left para-aortic lymphadenopathy compared to 8/26/2022, Stable eccentrically thickened bladder wall anterolaterally, which is underdistended, likely known bladder neoplasm  Increase in size of a subsolid pulmonary nodule in the inferior lingular segment, unclear if infectious/inflammatory or progression of metastatic disease  Improved trace left pleural effusion with indwelling pleural catheter  Improved left para-aortic lymphadenopathy, measuring up to 2 6 x 2 2 cm (series 2, image 77) at the level of L2-3, previously 3 2 x 2 7 cm    There is a 1 7 x 1 7 cm left para-aortic lymph node at the level of L4 (series 2, image 85), previously 2 0 x 2  0 cm  Enfortumab:   Overall, 55 participants (44%) with KUMAR  15 patients had CR  Responses to treatment lasted a median of mPFS 7 6 months  Discussion of decision making  I personally reviewed the case with Dr Bell Delaney team and the following lab results, the image studies, pathology, other specialty/physicians consult notes and recommendations, and outside medical records from [facility]  I had a lengthy discussion with the patient and shared the work-up findings  We discussed the diagnosis and management plan as below  I spent 47 minutes reviewing the records (labs, clinician notes, outside records, medical history, ordering medicine/tests/procedures, interpreting the imaging/labs previously done) and coordination of care as well as direct time with the patient today, of which greater than 50% of the time was spent in counseling and coordination of care with the patient/family  · Plan/Labs  · Cont C4 Enfortumab q28 cycles, if anemia or fatigue becomes recurring issue can dose reduce to 1mg/kg  · F/u Thoracic surgery for tunneled catheter care, monitoring  · F/u palliative care for insomnia issues  · Continue to assess for signs of distress as he has anxiety/depression  F/u Palliative for cancer associated pain  · F/u Urology for care and management of neph tubes  · Restaging CT CAP scheduled 1/3/2023    Tessa Suggs MD  Hematology, Oncology Staff Physician    Follow Up:  Every 4 weeks while on q28 day systemic therapy (scheduled until 12/16/2022)    All questions were answered to the patient's satisfaction during this encounter  The patient knows the contact information for our office and knows to reach out for any relevant concerns related to this encounter   They are to call for any temperature 100 4 or higher, new symptoms including but not restricted to shaking chills, decreased appetite, nausea, vomiting, diarrhea, increased fatigue, shortness of breath or chest pain, confusion, and not feeling the strength to come to the clinic  For all other listed problems and medical diagnosis in their chart - they are managed by PCP and/or other specialists, which the patient acknowledges  Thank you very much for your consultation and making us a part of this patient's care  We are continuing to follow closely with you  Please do not hesitate to reach out to us with any additional questions or concerns  ONCOLOGY HISTORY OF PRESENT ILLNESS        Oncology History   Bladder carcinoma ([facility] [address] )   8/16/2016 Initial Diagnosis    Bladder carcinoma ([facility] [address] )     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)    Initial Diagnosis    Malignant neoplasm of anterior wall of urinary bladder ([facility] [address] )     1994 Surgery    TURBT      1994 - 1995 Chemotherapy    Induction intravesical BCG x 2      8/17/2016 Surgery    TURBT      12/13/2016 Surgery    TURBT  [facility])    Bladder, left posterior wall, biopsy: High-grade urothelial carcinoma in-situ  Muscularis propria is identified with no tumor seen  Bladder, trigone, biopsy: Non-invasive high-grade papillary urothelial carcinoma, and flat urothelial carcinoma in-situ  Muscularis propria is not identified  1/4/2017 - 2/8/2017 Chemotherapy    Induction intravesical BCG     6/19/2017 Surgery    TURBT  [facility])    - Posterior wall, biopsy: Mild chronic cystitis with focal urothelial atypia  Muscularis propria is identified      - Right lateral wall, biopsy: Granulomatous cystitis  Muscularis propria is identified  - Left lateral wall, biopsy: Non invasive high-grade urothelial carcinoma  Muscularis propria is not identified         4/26/2019 Surgery    TURBT   [facility])     - bladder, right posterior wall, biopsy: Urothelial carcinoma in situ   - bladder, right lateral wall, biopsy: Small focus of urothelial carcinoma in situ  - bladder, left posterior wall, biopsy: Urothelial carcinoma in situ   - bladder, left lateral wall, biopsy: Urothelial carcinoma in situ     - bladder, trigone, biopsy: Invasive high-grade urothelial carcinoma, invading into lamina propria  No lymphovascular invasion seen  Muscularis propria not present  Separate piece showing urothelial carcinoma in situ        7/2019 -  Chemotherapy    Induction intravesical BCG x 4      11/4/2019 Surgery    TURBT  [facility])    - Superficial bladder tumor, transurethral resection: Non-invasive high grade urothelial carcinoma, with urothelial carcinoma in situ  Muscularis propria is not identified      - Bladder tumor, transurethral resection: Non-invasive high grade urothelial carcinoma, with urothelial carcinoma in situ, see note  Muscularis propria is present and free of tumor  12/9/2019 - 1/28/2020 Chemotherapy    Induction intravesical BCG+INF        6/9/2020 - 6/23/2020 Chemotherapy    Maintenance intravesical BCG+INF        11/19/2020 Biopsy    TURBT ([facility] Bhatt, Dr Lexi Corbett)    A  Urinary Bladder, Left Posterior Bladder Wall:  -Extensively- denuded urothelial lined mucosa with mild chronic inflammation, vascular congestion  And edema   -Unremarkable small fragment of detrusor muscle present  -No evidence of invasive urothelial carcinoma seen     B  Urinary Bladder, Left lateral bladder Wall:  -Partially-denuded urothelial lined mucosa with mild  chronic inflammation  -Unremarkable small fragment of detrusor muscle present  -No evidence of invasive urothelial carcinoma seen     C  Urinary Bladder, Right Posterior Bladder wall:  -Urothelial lined mucosa with mild  chronic inflammation Von Brunn nests and mild urothelial atypia, possibly due to previous BCG administration   -Unremarkable small fragment of detrusor muscle present  -No evidence of invasive urothelial carcinoma seen     D  Urinary Bladder, Right Lateral Bladder Wall:  - Urothelial lined mucosa with mild  chronic inflammation   -Muscularis propria/ detrusor muscle is not present for evaluation  -No evidence of invasive urothelial carcinoma seen  E  Prostate, Prostate Tissue:  -Urothelial lined mucosa with mild chronic inflammation, prominent Von Brunn nests and Cystitis cystica   -Unremarkable small fragment of detrusor muscle present   -No evidence of malignancy seen  10/18/2021 -  Cancer Staged    Staging form: Urinary Bladder, AJCC 8th Edition  - Clinical stage from 10/18/2021: Stage II (cT2, cN0, cM0) - Signed by Baltazar Stephenson MD on 11/17/2021  Stage prefix: Initial diagnosis       10/18/2021 Surgery    TURBT ([facility])    A  Left posterior wall, bladder (transurethral resection):     - Urothelial tissue with small atypical cauterized focus favored to represent superficially invasive high-grade urothelial carcinoma  - Muscularis propria (detrusor muscle) present, and negative for carcinoma  - Marked edema and mucosal denudation with thermal artifact noted  B  Anterior wall, bladder (transurethral resection):      - Invasive high-grade urothelial carcinoma  - Carcinoma invades muscularis propria (detrusor muscle)  C   Left lateral wall, bladder (transurethral resection):     - Urothelial tissue with small atypical focus with marked thermal artifact; cannot exclude superficial carcinoma  - Muscularis propria (detrusor muscle) present, and negative for carcinoma        - Marked edema and mucosal denudation with thermal artifact noted     1/24/2022 - 3/26/2022 Chemotherapy    mitoMYcin (MUTAMYCIN), 12 mg/m2 = 28 7 mg (100 % of original dose 12 mg/m2), Intravenous, Once, 1 of 1 cycle  Dose modification: 12 mg/m2 (original dose 12 mg/m2, Cycle 1), 3 mg/m2 (original dose 12 mg/m2, Cycle 1)  Administration: 7 2 mg (1/24/2022)  fluorouracil (ADRUCIL) ambulatory infusion Soln, 500 mg/m2/day = 4,780 mg (50 % of original dose 1,000 mg/m2/day), Intravenous, Over 96 hours, 1 of 1 cycle, Start date: 1/18/2022, End date: 1/23/2022  Dose modification: 500 mg/m2/day (original dose 1,000 mg/m2/day, Cycle 1, Reason: Dose modified as per discussion with consulting physician)     1/24/2022 - 3/24/2022 Radiation    Pelvis:1 6X 21 / 21 275 0 5,775 59      Treatment dates:  C1: 1/24/2022 - 3/24/2022     7/15/2022 - 8/26/2022 Chemotherapy    pembrolizumab (KEYTRUDA) IVPB, 400 mg, Intravenous, Once, 2 of 6 cycles  Administration: 400 mg (7/15/2022), 400 mg (8/26/2022)     9/16/2022 -  Chemotherapy    enfortumab vedotin-ejfv (PADCEV) IVPB, 125 mg (original dose 1 25 mg/kg), Intravenous, Once, 3 of 6 cycles  Dose modification: 125 mg (original dose 1 25 mg/kg, Cycle 1, Reason: Dose modified as per discussion with consulting physician)  Administration: 120 mg (9/16/2022), 120 mg (9/23/2022), 120 mg (9/30/2022), 120 mg (10/14/2022), 120 mg (10/21/2022), 120 mg (10/28/2022), 120 mg (11/11/2022)       He had been getting BCG vaccine for his bladder since 1994 6/28/2022 PET/CT: read pending, per my assessment lung metastatic hypermetabolic nodules as well as L chest wall nodule  9/14/2022: underwent thoracentesis, removed 2 L of fluid  9/22/2022: pleurx catheter care  11/17: 2 day admission, hematuria, 1U PRBC    SUBJECTIVE  (INTERVAL HISTORY)      Clotting History None   Bleeding History No major events   Cancer History Bladder   Family Cancer History None   H/O Blood/Plt Transfusion None   Tobacco/etoh/drug abuse 1 5 PPD x 17 years (quit 1970), no other abuse   Hx COVID19 Infection and Vaccine Status Pfizer x 3 (had booster 9/2021)   Cancer Screening history n/a   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)     Pain: dysuria (since 10/2021 bladder procedure, worse over the past few weeks and there was bleeding last night), LBP, L foot pain s/p remote toe amputation     His maximum weight prior to the gastric bypass that he had was 330lbs  I have reviewed the relevant past medical, surgical, social and family history   I have also reviewed allergies and medications for this patient  Interval events:   He is having very poor sleep these past 4 days  He has fallen twice in the past week where his legs give out on him (once before the PRBC was given and once this AM)  He has had some more trouble sleeping and is taking Melatonin and other medications  No blood in the urine  He has reduced dysuria  He has worsening fatigue  He has had some intermittent constipation  Review of Systems  Denies unintentional weight loss, F/C, N/V, CP, diarrhea, rash, itching, melena, hematuria, hematochezia  Chronic mild SOB with exertion and fatigue  A 10-point review of system was performed, pertinent positive and negative were detailed as above  Otherwise, the 10-point review of system was negative  Past Medical History:   Diagnosis Date   • Anemia     Last assessed: 9/28/17   • Anxiety    • Arteriosclerotic cardiovascular disease     Last assessed: 9/28/17   • Arthritis    • Bladder cancer ([facility] )     bladder- had BCG treatments   • Chronic kidney disease     Stage IV   • CKD (chronic kidney disease) stage 4, GFR 15-29 ml/min ([facility])    • Colon polyp    • Coronary artery disease     7 stents   • Depression    • Diabetes mellitus ([facility])     IDDM   • GERD (gastroesophageal reflux disease)    • Glaucoma    • Hematuria    • History of fusion of cervical spine    • Hyperlipidemia    • Hypertension    • Insomnia     Last assessed: 11/14/12   • Loss of hearing     has hearing aids but usually does not wear them   • Lung cancer ([facility] )    • Metastatic cancer ([facility] )    • Other seasonal allergic rhinitis     Last assessed: 2/10/16   • PAD (peripheral artery disease) ([facility])    • Shortness of breath     on exertion   • Spinal stenosis of lumbar region    • Transient cerebral ischemia     No Residual   • Uses walker     w/c for longer distances       Past Surgical History:   Procedure Laterality Date   • CARDIAC SURGERY      Cath stent placement   Last assessed: 3/9/17  Interventional Catheterization   • CHOLECYSTECTOMY     • COLONOSCOPY     • CYSTOSCOPY      Diagnostic w/biopsy  Martha   Last assessed: 12/1/14   • CYSTOSCOPY N/A 04/12/2022    Procedure: CYSTOSCOPY  Bladder biopsies  ;  Surgeon: Kalina Soria MD;  [facility];  Service: Urology   • CYSTOSCOPY W/ RETROGRADES Right 03/01/2022    Procedure: CYSTO; stent removal retrograde;  Surgeon: Kalina Soria MD;  [facility];  Service: Urology   • CYSTOSCOPY W/ URETERAL STENT PLACEMENT Bilateral 10/18/2021    Procedure: bilateral retrogrades, cytology collection;  Surgeon: Kalina Soria MD;  [facility];  Service: Urology   • CYSTOURETHROSCOPY      w/cautery  Martha    • FL RETROGRADE PYELOGRAM  10/18/2021   • FL RETROGRADE PYELOGRAM  10/24/2021   • GASTRIC BYPASS      For morbid obesity w/Shaji-en-Y   Resolved: 11/17/09   • INCISION AND DRAINAGE OF WOUND Right 02/26/2017    Procedure: INCISION AND DRAINAGE (I&D) EXTREMITY WITH APPLICATION OF GRAFT JACKET;  Surgeon: Evelina Dunlap DPM;  [facility];  Service:    • INCISION AND DRAINAGE OF WOUND Right 04/25/2017    Procedure: INCISION AND DRAINAGE (I&D) EXTREMITY, APPLICATION OF GRAFT;  Surgeon: Evelina Dunlap DPM;  [facility];  Service:    • IR BIOPSY OTHER  07/02/2020   • IR LOWER EXTREMITY ANGIOGRAM  02/08/2021   • IR LOWER EXTREMITY ANGIOGRAM  02/11/2021   • IR NEPHROSTOMY TUBE CHECK/CHANGE/REPOSITION/REINSERTION/UPSIZE  04/28/2022   • IR NEPHROSTOMY TUBE CHECK/CHANGE/REPOSITION/REINSERTION/UPSIZE  05/24/2022   • IR NEPHROSTOMY TUBE CHECK/CHANGE/REPOSITION/REINSERTION/UPSIZE  06/07/2022   • IR NEPHROSTOMY TUBE CHECK/CHANGE/REPOSITION/REINSERTION/UPSIZE  07/28/2022   • IR NEPHROSTOMY TUBE CHECK/CHANGE/REPOSITION/REINSERTION/UPSIZE  11/15/2022   • IR NEPHROSTOMY TUBE PLACEMENT  02/25/2022   • IR PORT PLACEMENT  01/17/2022   • IR THORACENTESIS  09/02/2022   • IR THORACENTESIS  09/14/2022   • IR THORACENTESIS WITH TUBE PLACEMENT Left     october   • IR TUNNELED CENTRAL LINE PLACEMENT  12/24/2020   • JOINT REPLACEMENT      christofer knees replaced   • AZ AMPUTATION METATARSAL+TOE,SINGLE Left 12/21/2020    Procedure: RAY RESECTION FOOT;  Surgeon: Porfirio Motta DPM;  [facility];  Service: Podiatry   • AZ AMPUTATION METATARSAL+TOE,SINGLE Left 12/31/2020    Procedure: 5TH MET RESECTION;  Surgeon: Porfirio Motta DPM;  [facility];  Service: Podiatry   • AZ CYSTOURETHROSCOPY W/IRRIG & EVAC CLOTS N/A 02/10/2021    Procedure: CYSTOSCOPY EVACUATION OF CLOTS, fulguration;  Surgeon: Mohsen Puri MD;  [facility];  Service: Urology   • AZ CYSTOURETHROSCOPY W/IRRIG & EVAC CLOTS N/A 10/24/2021    Procedure: CYSTOSCOPY EVACUATION OF CLOT, fulguration of bleeding vessels, right ureter stent placement, retrograde pyelogram;  Surgeon: Marianna Pastor MD;  [facility];  Service: Urology   • AZ CYSTOURETHROSCOPY,BIOPSY N/A 08/16/2016    Procedure: CYSTOSCOPY WITH BIOPSIES;  Surgeon: Supriya Arevalo MD;  [facility];  Service: Urology   • AZ CYSTOURETHROSCOPY,FULGUR <0 5 CM LESN N/A 11/19/2020    Procedure: CYSTO W/BIOPSIES, transurethral prostate bx;  Surgeon: Edris Primrose, MD;  [facility];  Service: Urology   • AZ CYSTOURETHROSCOPY,FULGUR >5 CM LESN Bilateral 10/18/2021    Procedure: TRANSURETHRAL RESECTION OF BLADDER TUMOR (TURBT);   Surgeon: Geoffrey Yu MD;  [facility];  Service: Urology   • AZ Priti Segura 3RD+ ORD [address] PEL/LXTR [facility] Left 02/08/2021    Procedure: LEG angiogram, CO2 w/limited contrast with balloon angioplasty postertior tibial artery;  Surgeon: Bekah Hannah MD;  [facility];  Service: Vascular   • ROTATOR CUFF REPAIR     • SMALL INTESTINE SURGERY      Surgery Shaji-en-Y   • SPINAL FUSION      lumbar and cervical fusions   • VAC DRESSING APPLICATION Right 35/90/0729    Procedure: APPLICATION VAC DRESSING;  Surgeon: Wei Rene Marco Antonio Solo DPM;  [facility];  Service:    • WOUND DEBRIDEMENT Left 2021    Procedure: FOOT DEBRIDE, [address] OUT w/graft application;  Surgeon: Lisa Matthews DPM;  [facility];  Service: Podiatry       Family History   Problem Relation Age of Onset   • Diabetes Mother    • Heart disease Mother    • Other Mother         High blood pressure   • Heart disease Father    • Diabetes Sister    • Other Sister         High blood pressure   • Kidney disease Sister    • Heart disease Brother    • Other Brother         High blood pressure       Social History     Socioeconomic History   • Marital status: /Civil Union     Spouse name: Not on file   • Number of children: Not on file   • Years of education: Not on file   • Highest education level: Not on file   Occupational History   • Not on file   Tobacco Use   • Smoking status: Former     Packs/day: 3 00     Years: 27 00     Pack years: 81 00     Types: Cigarettes     Quit date:      Years since quittin 9   • Smokeless tobacco: Never   Vaping Use   • Vaping Use: Never used   Substance and Sexual Activity   • Alcohol use: Never     Comment: beer / liquor   • Drug use: Not Currently     Types: Marijuana     Comment: quit 2019 had medical marijuana   • Sexual activity: Not Currently   Other Topics Concern   • Not on file   Social History Narrative    Consumes 1 cup of coffee and 1 soda per day     Social Determinants of Health     Financial Resource Strain: Not on file   Food Insecurity: No Food Insecurity   • Worried About Running Out of Food in the Last Year: Never true   • Ran Out of Food in the Last Year: Never true   Transportation Needs: No Transportation Needs   • Lack of Transportation (Medical): No   • Lack of Transportation (Non-Medical):  No   Physical Activity: Not on file   Stress: Not on file   Social Connections: Not on file   Intimate Partner Violence: Not on file   Housing Stability: Unknown   • Unable to Pay for Housing in the Last Year: No   • Number of Places Lived in the Last Year: 1   • Unstable Housing in the Last Year: Not on file       Allergies   Allergen Reactions   • Atorvastatin Hives, Itching and Rash   • Simvastatin Rash and Edema     Edema of lower legs   • Statins Hives and Itching   • Insulin Lispro Swelling and Edema     " Lower Legs"   • Other Itching, Rash and Other (See Comments)     "EKG Patches"   "blue EKG patches"       Current Outpatient Medications   Medication Sig Dispense Refill   • Accu-Chek Softclix Lancets lancets Test blood sugar 4 times daily 200 each 0   • acetaminophen (TYLENOL) 325 mg tablet Take 650 mg by mouth every 6 (six) hours as needed for mild pain       • ALPRAZolam (XANAX) 0 25 mg tablet Take 2 tablets (0 5 mg total) by mouth daily at bedtime as needed for anxiety 30 tablet 0   • ARIPiprazole (ABILIFY) 2 mg tablet Take 1 tablet (2 mg total) by mouth daily 30 tablet 0   • aspirin (ECOTRIN LOW STRENGTH) 81 mg EC tablet Take 1 tablet (81 mg total) by mouth daily     • Azelastine HCl 0 15 % SOLN Inhale 1 spray 2 (two) times a day 30 mL 3   • Bimatoprost (LUMIGAN OP) Apply 1 drop to eye daily at bedtime      • calcitriol (ROCALTROL) 0 25 mcg capsule Take 1 capsule (0 25 mcg total) by mouth daily 90 capsule 0   • cetirizine (ZyrTEC) 10 MG chewable tablet Chew 10 mg daily     • DULoxetine (CYMBALTA) 30 mg delayed release capsule TAKE ONE CAPSULE BY MOUTH EVERY DAY 90 capsule 0   • ezetimibe (ZETIA) 10 mg tablet Take 1 tablet (10 mg total) by mouth daily 90 tablet 1   • Ferrous Sulfate Dried (Feosol) 200 (65 Fe) MG TABS Take 65 mg by mouth daily 90 tablet 0   • fluocinonide (LIDEX) 0 05 % cream Apply topically as needed for irritation     • fluticasone (FLONASE) 50 mcg/act nasal spray 1 spray into each nostril as needed for allergies     • furosemide (LASIX) 20 mg tablet Take 1 tablet (20 mg total) by mouth daily 90 tablet 1   • gabapentin (NEURONTIN) 300 mg capsule TAKE ONE CAPSULE BY MOUTH EVERY DAY AT BEDTIME 90 capsule 0   • glucose blood (Accu-Chek Martha Plus) test strip Test blood sugar 4 times daily 200 strip 0   • hydrocortisone 2 5 % cream Apply topically 2 (two) times a day as needed for irritation or rash 30 g 0   • Insulin Pen Needle 31G X 8 MM MISC Inject 3 times a day 100 each 2   • isosorbide mononitrate (IMDUR) 30 mg 24 hr tablet TAKE ONE TABLET BY MOUTH EVERY DAY IN THE MORNING 90 tablet 0   • Lantus SoloStar 100 units/mL injection pen 18 units qhs (Patient taking differently: Inject 18 Units under the skin daily at bedtime) 30 mL 1   • lidocaine (XYLOCAINE) 2 % topical gel Apply topically as needed for mild pain 30 mL 0   • lidocaine-prilocaine (EMLA) cream Apply topically as needed for mild pain 30 g 0   • loperamide (IMODIUM) 2 mg capsule Take 2 mg by mouth 4 (four) times a day as needed for diarrhea     • metoprolol succinate (TOPROL-XL) 100 mg 24 hr tablet TAKE ONE TABLET BY MOUTH EVERY DAY 90 tablet 0   • multivitamin (THERAGRAN) TABS Take 1 tablet by mouth daily  • naloxone (NARCAN) 4 mg/0 1 mL nasal spray Administer 1 spray into a nostril  If no response after 2-3 minutes, give another dose in the other nostril using a new spray   1 each 1   • NovoLOG FlexPen 100 units/mL injection pen 10 units with each meal  (Patient taking differently: Inject 10 Units under the skin 3 (three) times a day with meals) 5 pen 1   • omeprazole (PriLOSEC) 20 mg delayed release capsule Take 1 capsule (20 mg total) by mouth daily in the early morning PRN 90 capsule 0   • ondansetron (Zofran ODT) 8 mg disintegrating tablet Take 1 tablet (8 mg total) by mouth every 8 (eight) hours as needed for nausea or vomiting 20 tablet 0   • oxybutynin (DITROPAN-XL) 10 MG 24 hr tablet TAKE ONE TABLET BY MOUTH EVERY DAY 30 tablet 0   • oxyCODONE (OxyCONTIN) 10 mg 12 hr tablet Take 1 tablet (10 mg total) by mouth every 8 (eight) hours Max Daily Amount: 30 mg 90 tablet 0   • oxyCODONE (Roxicodone) 5 immediate release tablet Take 1 tablet (5 mg total) by mouth every 4 (four) hours as needed for moderate pain Max Daily Amount: 30 mg 60 tablet 0   • Patiromer Sorbitex Calcium 8 4 g PACK Take 8 4 packets by mouth     • phenazopyridine (PYRIDIUM) 200 mg tablet Take 1 tablet (200 mg total) by mouth 3 (three) times a day with meals 10 tablet 0   • polyethylene glycol (MIRALAX) 17 g packet Take 17 g by mouth daily 30 each 0   • predniSONE 20 mg tablet Take 1 tablet (20 mg total) by mouth daily 5 tablet 0   • senna (SENOKOT) 8 6 MG tablet Take 2 tablets (17 2 mg total) by mouth 2 (two) times a day 90 tablet 0   • sodium chloride, PF, 0 9 % 10 mL by Intracatheter route daily Bilateral PCNs to be flushed daily with prefilled 10cc  mL 3   • tamsulosin (FLOMAX) 0 4 mg Take 1 capsule (0 4 mg total) by mouth daily with dinner 90 capsule 3   • sodium chloride, PF, 0 9 % 10 mL by Intracatheter route daily Intracatheter flushing daily (Patient not taking: Reported on 11/22/2022) 900 mL 0   • sodium chloride, PF, 0 9 % 10 mL by Intracatheter route daily Intracatheter flushing daily (Patient not taking: Reported on 11/22/2022) 900 mL 0     No current facility-administered medications for this visit  (Not in a hospital admission)      Objective:     24 Hour Vitals Assessment:     Vitals:    11/22/22 1433   BP: 126/62   Pulse: 98   Resp: 18   Temp: 97 5 °F (36 4 °C)   SpO2: 99%     Weight at last visit: 203 lbs  Weight today: 212 lbs    PHYSICIAN EXAM:    General: Appearance: alert, cooperative, NAD, using a walker for today's visit  HEENT: Normocephalic, atraumatic  No scleral icterus  conjunctivae clear  EOMI  Chest: No tenderness to palpation  No open wound noted  Lungs: L sided diminished compared to the R, Respirations unlabored  Cardiac: Regular rate and rhythm, +S1and S2  Abdomen: Soft, non-tender, non-distended   Bowel sounds are normal    :  Bilateral nephrostomy tubes present, draining clear urine  Extremities:  No edema, cyanosis, clubbing  Skin: Skin color, turgor are normal    Lymphatics: no palpable supra-cervical, axillary, or inguinal adenopathy  Neurologic: Awake, Alert, and oriented, no gross focal deficits noted b/l  Port c/d/i      DATA REVIEW:    Pathology Result:    Final Diagnosis   Date Value Ref Range Status   09/22/2022   Final    A -B  Pleural Fluid, Left (Thin-prep and cell block preparations):    Negative for malignancy  Benign mesothelial cells and histiocytes  Scattered lymphocytes and neutrophils  Proteinaceous material      Satisfactory for evaluation  Comment: Immunohistochemistry for MOC-31 and BerEp4 is negative  CD68 stains numerous histiocytes, D2-40 stains rare mesothelial cells, and Gata3 stains inflammatory cells  09/02/2022   Final    A B  Pleural fluid, Left, Thoracentesis (ThinPrep and cell block preparations):  Negative for malignancy  Benign mesothelial cells, lymphocytes and histiocytes  Satisfactory for evaluation  04/12/2022   Final    A  Urinary Bladder, selected bladder biopsies:  - Focally intact urothelial mucosa with associated acutely inflamed granulation tissue  See comment  B  Prostatic urethra:  - Focally intact urothelial mucosa with associated acutely inflamed granulation tissue  See comment  Comment: Immunohistochemistry for AE1/3 is negative for an infiltrative pattern  Clinical history notable for status post chemotherapy and urine culture positive for Serratia marcescens  The findings are compatible with infectious cystitis  10/18/2021   Final    A  Left posterior wall, bladder (transurethral resection):     - Urothelial tissue with small atypical cauterized focus favored to represent superficially invasive high-grade urothelial carcinoma  - Muscularis propria (detrusor muscle) present, and negative for carcinoma  - Marked edema and mucosal denudation with thermal artifact noted  B    Anterior wall, bladder (transurethral resection):      - Invasive high-grade urothelial carcinoma  - Carcinoma invades muscularis propria (detrusor muscle)  C   Left lateral wall, bladder (transurethral resection):     - Urothelial tissue with small atypical focus with marked thermal artifact; cannot exclude superficial carcinoma  - Muscularis propria (detrusor muscle) present, and negative for carcinoma  - Marked edema and mucosal denudation with thermal artifact noted  Comment: This is an appended report  These results have been appended to a previously preliminary verified report  10/18/2021   Final    A  Renal Washing, RIGHT RENAL PELVIS WASHING:  Suspicious for high grade urothelial carcinoma [facility]) - see comment  Comment:  The above diagnostic category is from the recently published book, [facility] [place] [facility]inary Cyt[facility], and is in keeping with the ongoing effort for utilization of standardized diagnostic terminology in urine cytology  *    *[facility] [place] [facility]inary Cyt[facility]  Renetta Greco; 2016  B  Renal Washing, LEFT RENAL PELVIS WASHING:  Atypical urothelial cells (AUC) - see comment  Comment:  The above diagnostic category is from the recently published book, [facility] [place] [facility]inary Cyt[facility], and is in keeping with the ongoing effort for utilization of standardized diagnostic terminology in urine cytology  *    *[facility] [place] [facility]  Renetta Greco; 2016 09/03/2021   Final    A  Urine, Voided, :  Atypical urothelial cells (AUC) - see comment  Red blood cells     Satisfactory for evaluation  Comment:    - Few atypical urothelial cells noted in this patient with a 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    A high grade urothelial carcinoma cannot be excluded on this material  Suggest clinical correlation and follow-up as indicated  - The above diagnostic category is from the recently published book, [facility] [place] [facility] Cyt[facility], and is in keeping with the ongoing effort for utilization of standardized diagnostic terminology in urine cytology  *    *[facility] [place] [facility]  Renetta Torres Jack Rodarteen; 2016 ' 12/31/2020   Final    A  Left 5th metatarsal bone:  - Acute osteomyelitis in benign metatarsal bone  12/21/2020   Final    A  Bone, left 5th metatarsal, amputation:       - Acute osteomyelitis  - No dysplasia or malignancy is identified  B  Bone, 5th metatarsal clean margin, excision:       - Focal acute osteomyelitis  - Large caliber blood vessels with luminal calcifications and greater than 75% occlusion        - No dysplasia or malignancy is identified  Interpretation performed at [address] [address]       11/19/2020   Final    A  Urinary Bladder, Left Posterior Bladder Wall:  -Extensively- denuded urothelial lined mucosa with mild chronic inflammation, vascular congestion  And edema   -Unremarkable small fragment of detrusor muscle present  -No evidence of invasive urothelial carcinoma seen    B  Urinary Bladder, Left lateral bladder Wall:  -Partially-denuded urothelial lined mucosa with mild  chronic inflammation  -Unremarkable small fragment of detrusor muscle present  -No evidence of invasive urothelial carcinoma seen    C  Urinary Bladder, Right Posterior Bladder wall:  -Urothelial lined mucosa with mild  chronic inflammation Von Brunn nests and mild urothelial atypia, possibly due to previous BCG administration   -Unremarkable small fragment of detrusor muscle present  -No evidence of invasive urothelial carcinoma seen    D   Urinary Bladder, Right Lateral Bladder Wall:  - Urothelial lined mucosa with mild  chronic inflammation   -Muscularis propria/ detrusor muscle is not present for evaluation    -No evidence of invasive urothelial carcinoma seen  E  Prostate, Prostate Tisssue:  -Urothelial lined mucosa with mild chronic inflammation, prominent Von Brunn nests and Cystitis cystica   -Unremarkable small fragment of detrusor muscle present   -No evidence of malignancy seen  08/17/2020   Final    A  Urine, Other, :  Negative for high grade urothelial carcinoma ([address]) - see comment  Urothelial cells, squamous cells, red blood cells and neutrophils  Satisfactory for evaluation  Comment:  The above diagnostic category is from the recently published book, [facility] [place] [facility]inary Cyt[facility], and is in keeping with the ongoing effort for utilization of standardized diagnostic terminology in urine cytology  *    *[facility] [place] [facility]  Renetta L  Humberto [facility] Consuella Ceballos; 2016 08/16/2016   Final    A   Bladder, biopsy:  -  High-grade urothelial carcinoma with flat and focal papillary architecture (see note)  07/18/2016   Final    A  Urine, clean catch (ThinPrep): Atypical urothelial cells (AUC) - see comment  Atypical single urothelial cells, benign squamous cells, red blood cells and neutrophils  Background of degenerative changes noted  Satisfactory for evaluation  Comment:  The above diagnostic category is from the recently published book, [facility] [place] [facility]inary Cyt[facility], and is in keeping with the ongoing effort for utilization of standardized diagnostic terminology in urine cytology  *    *[facility] [place] [place] [facility]  [facility] L  [facility]; 2016 01/18/2016   Final    A  Urine, Unspecified Source, :  Rare cluster of degenerated appearing urothelial cells present; see note  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    Few red blood cells also identified  Satisfactory for evaluation  Image Results:   Image result are reviewed and documented in Hematology/Oncology history    XR chest 2 views  Narrative: CHEST     INDICATION:   dyspnea  COMPARISON:  9/22/2022  EXAM PERFORMED/VIEWS:  XR CHEST PA & LATERAL    FINDINGS:  Stably positioned right-sided chest port  Loop recorder device overlies the left lower hemithorax  Cardiomediastinal silhouette appears unremarkable  No focal airspace consolidation  Trace left pleural effusion  No pneumothorax  Partially visualized cervical fusion hardware  Impression: Trace left pleural effusion  Workstation performed: [id number]  CT abdomen pelvis wo contrast  Narrative: CT ABDOMEN AND PELVIS WITHOUT IV CONTRAST    INDICATION:   Hematuria, gross/macroscopic  hematuria  COMPARISON:  10/4/2022    TECHNIQUE:  CT examination of the abdomen and pelvis was performed without intravenous contrast  Axial, sagittal, and coronal 2D reformatted images were created from the source data and submitted for interpretation  Radiation dose length product (DLP) for this visit:  657 mGy-cm   This examination, like all CT scans performed in the [facility], was performed utilizing techniques to minimize radiation dose exposure, including the use of iterative   reconstruction and automated exposure control  Enteric contrast was not administered in accordance with physician request      FINDINGS:    ABDOMEN    LOWER CHEST:  There is an increased left pleural effusion with chest tube in place  There is a stable 2 mm right upper lobe nodule on series 2 image 2  Again seen are pleural-based metastasis anteriorly on the left with the largest lesion measuring 2 0   x 3 6 cm on series 2 image 15, previously 1 9 x 3 7 cm, with new adjacent lymph node in the epicardial fat measuring 1 2 x 1 5 cm    There is mild high density posteriorly in the left lower lobe which may also represent pleural metastasis though   suboptimally evaluated without contrast     LIVER/BILIARY TREE:  Unremarkable  GALLBLADDER:  Gallbladder is surgically absent  SPLEEN:  Unremarkable  PANCREAS:  Unremarkable  ADRENAL GLANDS:  Unremarkable  KIDNEYS/URETERS:  There are bilateral nephrostomies without hydronephrosis  The left ureter is dilated to the bladder  This is similar to the previous study  There is a left renal low-density mass measuring 3 0 x 5 6 cm, previously 3 0 x 5 9 cm  STOMACH AND BOWEL:  Status post gastric bypass  Bowel is unremarkable  APPENDIX:  No findings to suggest appendicitis  ABDOMINOPELVIC CAVITY:  No ascites  No pneumoperitoneum  There is retroperitoneal lymphadenopathy which is improved  A left para-aortic lymph node on series 2 image 43 measures 1 5 x 2 2 cm, previously 2 1 x 3 0 cm  A left para-aortic lymph node on   series 2 image 52 measures 1 4 x 1 6 cm, previously 1 9 x 2 3 cm  VESSELS:  Unremarkable for patient's age  PELVIS    REPRODUCTIVE ORGANS:  Unremarkable for patient's age  URINARY BLADDER:  There is bladder wall thickening consistent with known neoplasm, asymmetrically involving the anterior and left bladder wall as well as the left ureterovesical junction  ABDOMINAL WALL/INGUINAL REGIONS:  Unremarkable  OSSEOUS STRUCTURES:  No acute fracture or destructive osseous lesion  Impression: 1  Stable eccentric bladder wall thickening on the left involving the left ureterovesical junction compatible with known neoplasm  2   No hydronephrosis with nephrostomy catheters are in place  Unchanged left hydroureter  3   Improved metastatic retroperitoneal lymphadenopathy  4   Grossly stable pleural metastasis with increased left pleural effusion and new lymphadenopathy in the adjacent epicardial fat    Evaluation is limited without intravenous contrast     Workstation performed: [id number]      LABS:  Lab data are reviewed and documented in HemOnc history  Lab Results   Component Value Date    HGB 8 8 (L) 11/19/2022    HCT 26 7 (L) 11/19/2022    MCV 94 11/19/2022     11/19/2022    WBC 10 13 11/19/2022    NRBC 0 11/18/2022    BANDSPCT 3 02/04/2022    ATYLMPCT 1 (H) 02/04/2022     Lab Results   Component Value Date     09/03/2015    K 4 7 11/19/2022     11/19/2022    CO2 25 11/19/2022    ANIONGAP 5 09/03/2015    BUN 46 (H) 11/19/2022    CREATININE 3 03 (H) 11/19/2022    GLUCOSE 203 (H) 09/03/2015    GLUF 84 08/25/2022    CALCIUM 8 8 11/19/2022    CORRECTEDCA 9 5 11/18/2022    AST 39 11/18/2022    ALT 33 11/18/2022    ALKPHOS 110 11/18/2022    PROT 6 4 07/26/2015    BILITOT 0 50 07/26/2015    EGFR 18 11/19/2022       Lab Results   Component Value Date    IRON 43 (L) 11/10/2022    TIBC 230 (L) 11/10/2022    FERRITIN 153 11/10/2022    FERRITIN 23 12/10/2021    FERRITIN 40 10/13/2021    FERRITIN 31 06/22/2021    FERRITIN 23 02/17/2021    FERRITIN 52 12/19/2020    FERRITIN 73 09/19/2018       No results for input(s): WBC, CREAT in the last 72 hours      Invalid input(s):  PLT  By:  Emir Elliott, 11/22/2022, 2:40 PM

## 2022-11-22 ENCOUNTER — OFFICE VISIT (OUTPATIENT)
Dept: CARDIAC SURGERY | Facility: CLINIC | Age: 77
End: 2022-11-22

## 2022-11-22 ENCOUNTER — TRANSCRIBE ORDERS (OUTPATIENT)
Dept: VASCULAR SURGERY | Facility: CLINIC | Age: 77
End: 2022-11-22

## 2022-11-22 ENCOUNTER — OFFICE VISIT (OUTPATIENT)
Dept: HEMATOLOGY ONCOLOGY | Facility: CLINIC | Age: 77
End: 2022-11-22

## 2022-11-22 ENCOUNTER — TELEPHONE (OUTPATIENT)
Dept: PALLIATIVE MEDICINE | Facility: CLINIC | Age: 77
End: 2022-11-22

## 2022-11-22 VITALS
DIASTOLIC BLOOD PRESSURE: 70 MMHG | HEIGHT: 76 IN | RESPIRATION RATE: 16 BRPM | SYSTOLIC BLOOD PRESSURE: 119 MMHG | BODY MASS INDEX: 24.7 KG/M2 | WEIGHT: 202.82 LBS | HEART RATE: 88 BPM | OXYGEN SATURATION: 100 % | TEMPERATURE: 97.5 F

## 2022-11-22 VITALS
SYSTOLIC BLOOD PRESSURE: 126 MMHG | WEIGHT: 212.5 LBS | HEART RATE: 98 BPM | OXYGEN SATURATION: 99 % | HEIGHT: 76 IN | TEMPERATURE: 97.5 F | BODY MASS INDEX: 25.88 KG/M2 | RESPIRATION RATE: 18 BRPM | DIASTOLIC BLOOD PRESSURE: 62 MMHG

## 2022-11-22 DIAGNOSIS — N18.6 END STAGE KIDNEY DISEASE (HCC): ICD-10-CM

## 2022-11-22 DIAGNOSIS — J90 LARGE PLEURAL EFFUSION: ICD-10-CM

## 2022-11-22 DIAGNOSIS — C67.3 MALIGNANT NEOPLASM OF ANTERIOR WALL OF URINARY BLADDER (HCC): Primary | ICD-10-CM

## 2022-11-22 DIAGNOSIS — N18.9 CHRONIC KIDNEY DISEASE, UNSPECIFIED CKD STAGE: Primary | ICD-10-CM

## 2022-11-22 DIAGNOSIS — J90 RECURRENT LEFT PLEURAL EFFUSION: Primary | ICD-10-CM

## 2022-11-22 NOTE — ASSESSMENT & PLAN NOTE
Mr Cecilia Cohn has a recurrent left pleural effusion with cytology negative for malignancy x2  His indwelling pleural catheter is draining minimal serous fluid on a weekly basis  We will continue to drain him on a weekly basis for 3 more weeks  At that point they will call the office and let us know the weekly outputs  If the outputs are low then we will stop drainage for a month and see him back in the office with a PA and lateral chest x-ray for possible catheter removal   Both he and his wife understand the plan and have our office number so that they can call us with any issues

## 2022-11-22 NOTE — TELEPHONE ENCOUNTER
He hasn't been able to sleep  He spoke to his oncologist and he wanted patient to discuss maybe getting a script for [place] from April  Please advise

## 2022-11-22 NOTE — PROGRESS NOTES
Thoracic Consult  Assessment/Plan:    Recurrent left pleural effusion  Mr Alba Hidalgo has a recurrent left pleural effusion with cytology negative for malignancy x2  His indwelling pleural catheter is draining minimal serous fluid on a weekly basis  We will continue to drain him on a weekly basis for 3 more weeks  At that point they will call the office and let us know the weekly outputs  If the outputs are low then we will stop drainage for a month and see him back in the office with a PA and lateral chest x-ray for possible catheter removal   Both he and his wife understand the plan and have our office number so that they can call us with any issues  Diagnoses and all orders for this visit:    Recurrent left pleural effusion    Large pleural effusion  -     Ambulatory Referral to Thoracic Oncology          Thoracic History   Diagnosis: Left pleural effusion   Procedures/Surgeries:    Pathology:    Adjuvant Therapy:       Subjective:    Patient ID: Surendra Alfred is a 76 y o  male  Mr Alba Hidalgo presents to the office today for management of an indwelling pleural catheter  He has a history of bladder cancer and currently has bilateral nephrostomy tubes  A PET-CT scan from June of 2022 demonstrated hypermetabolic pulmonary nodules and a suggestion of pleural based disease  He was admitted recently with hematuria but also found to have a left pleural effusion  This had been tapped twice and is negative for malignancy  He had an indwelling pleural catheter placed in the family has been managing this at home  He currently is draining it once a week with between scant and 100 mL of serous fluid although his wife left the diarrhea exact drainage at home  He is not currently complaining of significant shortness of breath  He denies fever, chills, or pain at his catheter site  AP and lateral chest x-ray performed November 17, 2022 demonstrates only trace left pleural fluid        The following portions of the patient's history were reviewed and updated as appropriate: allergies, current medications, past family history, past medical history, past social history, past surgical history and problem list     Past Medical History:   Diagnosis Date   • Anemia     Last assessed: 9/28/17   • Anxiety    • Arteriosclerotic cardiovascular disease     Last assessed: 9/28/17   • Arthritis    • Bladder cancer ([place] [address] )     bladder- had BCG treatments   • Chronic kidney disease     Stage IV   • CKD (chronic kidney disease) stage 4, GFR 15-29 ml/min ([facility])    • Colon polyp    • Coronary artery disease     7 stents   • Depression    • Diabetes mellitus ([place] [address] )     IDDM   • GERD (gastroesophageal reflux disease)    • Glaucoma    • Hematuria    • History of fusion of cervical spine    • Hyperlipidemia    • Hypertension    • Insomnia     Last assessed: 11/14/12   • Loss of hearing     has hearing aids but usually does not wear them   • Lung cancer ([place] [address] )    • Metastatic cancer ([place] [address] )    • Other seasonal allergic rhinitis     Last assessed: 2/10/16   • PAD (peripheral artery disease) ([facility])    • Shortness of breath     on exertion   • Spinal stenosis of lumbar region    • Transient cerebral ischemia     No Residual   • Uses walker     w/c for longer distances      Past Surgical History:   Procedure Laterality Date   • CARDIAC SURGERY      Cath stent placement  Last assessed: 3/9/17  Interventional Catheterization   • CHOLECYSTECTOMY     • COLONOSCOPY     • CYSTOSCOPY      Diagnostic w/biopsy  [facility]  Last assessed: 12/1/14   • CYSTOSCOPY N/A 04/12/2022    Procedure: CYSTOSCOPY  Bladder biopsies  ;  Surgeon: Franki Fraga MD;  [facility];  Service: Urology   • CYSTOSCOPY W/ RETROGRADES Right 03/01/2022    Procedure: CYSTO; stent removal retrograde;  Surgeon: Franki Fraga MD;  [facility];  Service: Urology   • CYSTOSCOPY W/ URETERAL STENT PLACEMENT Bilateral 10/18/2021    Procedure: bilateral retrogrades, cytology collection;  Surgeon: Emily Samuels MD;  [facility];  Service: Urology   • CYSTOURETHROSCOPY      w/cautery  Hilary Beverly   • FL RETROGRADE PYELOGRAM  10/18/2021   • FL RETROGRADE PYELOGRAM  10/24/2021   • GASTRIC BYPASS      For morbid obesity w/Shaji-en-Y   Resolved: 11/17/09   • INCISION AND DRAINAGE OF WOUND Right 02/26/2017    Procedure: INCISION AND DRAINAGE (I&D) EXTREMITY WITH APPLICATION OF GRAFT JACKET;  Surgeon: Chago Martinez DPM;  [facility];  Service:    • INCISION AND DRAINAGE OF WOUND Right 04/25/2017    Procedure: INCISION AND DRAINAGE (I&D) EXTREMITY, APPLICATION OF GRAFT;  Surgeon: Chago Martinez DPM;  [facility];  Service:    • IR BIOPSY OTHER  07/02/2020   • IR LOWER EXTREMITY ANGIOGRAM  02/08/2021   • IR LOWER EXTREMITY ANGIOGRAM  02/11/2021   • IR NEPHROSTOMY TUBE CHECK/CHANGE/REPOSITION/REINSERTION/UPSIZE  04/28/2022   • IR NEPHROSTOMY TUBE CHECK/CHANGE/REPOSITION/REINSERTION/UPSIZE  05/24/2022   • IR NEPHROSTOMY TUBE CHECK/CHANGE/REPOSITION/REINSERTION/UPSIZE  06/07/2022   • IR NEPHROSTOMY TUBE CHECK/CHANGE/REPOSITION/REINSERTION/UPSIZE  07/28/2022   • IR NEPHROSTOMY TUBE CHECK/CHANGE/REPOSITION/REINSERTION/UPSIZE  11/15/2022   • IR NEPHROSTOMY TUBE PLACEMENT  02/25/2022   • IR PORT PLACEMENT  01/17/2022   • IR THORACENTESIS  09/02/2022   • IR THORACENTESIS  09/14/2022   • IR THORACENTESIS WITH TUBE PLACEMENT Left     october   • IR TUNNELED CENTRAL LINE PLACEMENT  12/24/2020   • JOINT REPLACEMENT      christofer knees replaced   • MN AMPUTATION METATARSAL+TOE,SINGLE Left 12/21/2020    Procedure: RAY RESECTION FOOT;  Surgeon: Torin Randolph DPM;  [facility];  Service: Podiatry   • MN AMPUTATION METATARSAL+TOE,SINGLE Left 12/31/2020    Procedure: 5TH MET RESECTION;  Surgeon: Torin Randolph DPM;  [facility];  Service: Podiatry   • MN CYSTOURETHROSCOPY W/IRRIG & EVAC CLOTS N/A 02/10/2021    Procedure: CYSTOSCOPY EVACUATION OF CLOTS, fulguration;  Surgeon: Dario Bush MD;  [facility];  Service: Urology   • MO CYSTOURETHROSCOPY W/IRRIG & EVAC CLOTS N/A 10/24/2021    Procedure: CYSTOSCOPY EVACUATION OF CLOT, fulguration of bleeding vessels, right ureter stent placement, retrograde pyelogram;  Surgeon: Mercy Noe MD;  [facility];  Service: Urology   • MO CYSTOURETHROSCOPY,BIOPSY N/A 08/16/2016    Procedure: Jose Luis Napier;  Surgeon: Adarsh Dunaway MD;  [facility];  Service: Urology   • MO CYSTOURETHROSCOPY,FULGUR <0 5 CM LESN N/A 11/19/2020    Procedure: CYSTO W/BIOPSIES, transurethral prostate bx;  Surgeon: Jag Calvillo MD;  [facility];  Service: Urology   • MO CYSTOURETHROSCOPY,FULGUR >5 CM LESN Bilateral 10/18/2021    Procedure: TRANSURETHRAL RESECTION OF BLADDER TUMOR (TURBT);   Surgeon: Froilan Mosley MD;  [facility];  Service: Urology   • MO Oneyda Husain 3RD+ ORD [address] PEL/LXTR [facility] Left 02/08/2021    Procedure: LEG angiogram, CO2 w/limited contrast with balloon angioplasty postertior tibial artery;  Surgeon: Flora Franco MD;  [facility];  Service: Vascular   • ROTATOR CUFF REPAIR     • SMALL INTESTINE SURGERY      Surgery Shaji-en-Y   • SPINAL FUSION      lumbar and cervical fusions   • VAC DRESSING APPLICATION Right 46/61/3299    Procedure: APPLICATION VAC DRESSING;  Surgeon: Bernabe Munguia DPM;  [facility];  Service:    • WOUND DEBRIDEMENT Left 02/16/2021    Procedure: FOOT DEBRIDE, [address] OUT w/graft application;  Surgeon: Bernabe Munguia DPM;  [facility];  Service: Podiatry      Family History   Problem Relation Age of Onset   • Diabetes Mother    • Heart disease Mother    • Other Mother         High blood pressure   • Heart disease Father    • Diabetes Sister    • Other Sister         High blood pressure   • Kidney disease Sister    • Heart disease Brother    • Other Brother         High blood pressure      Social History Socioeconomic History   • Marital status: /Civil Union     Spouse name: Not on file   • Number of children: Not on file   • Years of education: Not on file   • Highest education level: Not on file   Occupational History   • Not on file   Tobacco Use   • Smoking status: Former     Packs/day: 3 00     Years: 27 00     Pack years: 81 00     Types: Cigarettes     Quit date: 5     Years since quittin 9   • Smokeless tobacco: Never   Vaping Use   • Vaping Use: Never used   Substance and Sexual Activity   • Alcohol use: Never     Comment: beer / liquor   • Drug use: Not Currently     Types: Marijuana     Comment: quit 2019 had medical marijuana   • Sexual activity: Not Currently   Other Topics Concern   • Not on file   Social History Narrative    Consumes 1 cup of coffee and 1 soda per day     Social Determinants of Health     Financial Resource Strain: Not on file   Food Insecurity: No Food Insecurity   • Worried About Running Out of Food in the Last Year: Never true   • Ran Out of Food in the Last Year: Never true   Transportation Needs: No Transportation Needs   • Lack of Transportation (Medical): No   • Lack of Transportation (Non-Medical): No   Physical Activity: Not on file   Stress: Not on file   Social Connections: Not on file   Intimate Partner Violence: Not on file   Housing Stability: Unknown   • Unable to Pay for Housing in the Last Year: No   • Number of Places Lived in the Last Year: 1   • Unstable Housing in the Last Year: Not on file      Review of Systems   Constitutional: Negative for activity change, appetite change, chills, fever and unexpected weight change  Respiratory: Negative for cough, shortness of breath and wheezing  Cardiovascular: Negative for chest pain, palpitations and leg swelling  Gastrointestinal: Negative for abdominal pain, constipation, diarrhea, nausea and vomiting  Musculoskeletal: Negative for arthralgias and myalgias  Skin: Negative for rash  Neurological: Negative for dizziness, seizures, weakness, numbness and headaches  Hematological: Negative for adenopathy  Psychiatric/Behavioral: Negative for confusion  Objective:   Physical Exam  Vitals reviewed  Constitutional:       General: He is not in acute distress  Appearance: He is well-developed  HENT:      Head: Normocephalic and atraumatic  Mouth/Throat:      Mouth: Oropharynx is clear and moist    Eyes:      General: No scleral icterus  Extraocular Movements: EOM normal       Conjunctiva/sclera: Conjunctivae normal       Pupils: Pupils are equal, round, and reactive to light  Neck:      Trachea: No tracheal deviation  Cardiovascular:      Rate and Rhythm: Normal rate and regular rhythm  Heart sounds: Normal heart sounds  Pulmonary:      Effort: Pulmonary effort is normal  No respiratory distress  Breath sounds: No wheezing or rales  Comments: Breath sounds clear with only slightly decreased breath sounds at the left base  Chest:      Chest wall: No tenderness  Abdominal:      General: Bowel sounds are normal  There is no distension  Palpations: Abdomen is soft  Tenderness: There is no abdominal tenderness  Musculoskeletal:         General: No edema  Cervical back: Normal range of motion and neck supple  Lymphadenopathy:      Cervical: No cervical adenopathy  Skin:     General: Skin is warm and dry  Neurological:      Mental Status: He is alert and oriented to person, place, and time  Cranial Nerves: No cranial nerve deficit  Psychiatric:         Mood and Affect: Mood and affect normal          Behavior: Behavior normal          Thought Content:  Thought content normal          Judgment: Judgment normal      /70 (BP Location: Right arm, Patient Position: Sitting, Cuff Size: Standard)   Pulse 88   Temp 97 5 °F (36 4 °C) (Temporal)   Resp 16   Ht 6' 3 5" (1 918 m)   Wt 92 kg (202 lb 13 2 oz)   SpO2 100%   BMI 25 02 kg/m²     XR chest 2 views    Result Date: 11/17/2022  Impression Trace left pleural effusion  Workstation performed: [id number]     XR chest pa & lateral    Result Date: 9/13/2022  Impression Persistent large left pleural effusion  Workstation performed: [id number]      PET-CT scan from June 28, 2022 is personally reviewed  FINDINGS:     Please note that comparison to prior PET/CT is limited as dedicated CT only images from prior PET/CT are not available for direct comparison      VISUALIZED BRAIN:     No acute abnormalities are seen      HEAD/NECK:     There is a physiologic distribution of FDG  No FDG avid cervical adenopathy is seen  CT images: Intracranial atherosclerotic calcification is noted  Stable nodularity involving the thyroid gland      CHEST:     On image 75 of series 4 there is an increasing in size hypermetabolic right lung nodule measuring 1 0 cm with Max SUV of 3 2, most recently 0 6 cm      On image 87 of series 4 there is an increasing in size lingular nodule measuring 1 4 cm with Max SUV of 5 3, previously 0 8 cm      On image 72 series 4 there is an increasing in size 6 mm left lower lobe lung nodule with max SUV of 3 1, previously 4 mm      On image 98 of series 4, there is a hypermetabolic pleural-based versus less likely mediastinal based soft tissue nodule measuring 1 6 x 1 0 cm with Max SUV of 10 3  Additional hypermetabolic pleural-based versus diaphragmatic base soft tissue nodules   along noted along the anterolateral left lower chest wall on image 1:15 of series 4 measuring approximately 3 4 x 1 4 cm with Max SUV of 9 3  These suspected pleural-based soft tissue lesions are poorly characterized on low-dose unenhanced CT      CT images: Right-sided chest port  Bilateral gynecomastia  Atherosclerotic vascular calcifications including those of the coronary arteries are noted    6 mm linear left apical lung nodule on image 58 of series 4, stable since most recent exam   Trace left pleural effusion      ABDOMEN:     There are mildly prominent minimally FDG avid retroperitoneal lymph nodes of uncertain clinical significance; for example, on image 134 of series 4, there is a left periaortic retroperitoneal lymph node measuring 1 7 x 1 4 cm with Max SUV of 2 5  This   lymph node appears slightly more prominent than prior imaging and is suspicious but not definitive for developing retroperitoneal fabien metastatic disease  CT images: Status post cholecystectomy  Bilateral percutaneous nephrostomy tubes  Bilateral renal hypodensities are poorly characterized on low-dose unenhanced CT with largest left renal hypodensity consistent with renal cyst   Postsurgical changes   from prior gastric bypass are noted  Small hiatal hernia  Atherosclerotic vascular calcifications are noted  Diverticulosis coli  Subcutaneous nodularity involving the bilateral aspects of the anterior abdominal wall most consistent with subcutaneous   injection sites      PELVIS:   There is eccentric mural thickening involving the left urinary bladder and most prominently involving the anterolateral aspect of the left superior urinary bladder (for example image 174 series 4), presumably reflecting patient's known primary urinary   bladder cancer  Evaluation for metabolic activity is limited secondary to background urinary activity      There is nonspecific patchy FDG activity involving the enlarged prostate gland of uncertain clinical significance  CT images: Nonspecific concentric mural thickening of the rectum of uncertain clinical significance and possibly related to underdistention with underlying rectal mucosal process not excluded      OSSEOUS STRUCTURES:  No FDG avid lesions are seen  CT images: Degenerative changes are noted involving the spine  Anterior fusion of cervical spine  Orthopedic hardware involving the right humeral head      IMPRESSION:     1    Interval progression of hypermetabolic malignancy with multiple new hypermetabolic pulmonary nodules and suspected pleural-based nodules  Please note that there is been significant interval increase in size of pulmonary nodules since most recent CT   of chest dated 6/3/2022      2  Irregular mural thickening involving the urinary bladder presumably reflect the patient's known bladder cancer      3  Minimally FDG avid borderline enlarged retroperitoneal lymph nodes of uncertain clinical significance but suspicious for possible developing fabien metastatic disease

## 2022-11-23 ENCOUNTER — HOSPITAL ENCOUNTER (OUTPATIENT)
Dept: INFUSION CENTER | Facility: HOSPITAL | Age: 77
Discharge: HOME | End: 2022-11-23
Attending: INTERNAL MEDICINE

## 2022-11-23 DIAGNOSIS — C67.3 MALIGNANT NEOPLASM OF ANTERIOR WALL OF URINARY BLADDER (HCC): Primary | ICD-10-CM

## 2022-11-23 DIAGNOSIS — Z95.828 PORT-A-CATH IN PLACE: ICD-10-CM

## 2022-11-23 DIAGNOSIS — G47.9 SLEEP DISTURBANCE: Primary | ICD-10-CM

## 2022-11-23 LAB
BASOPHILS # BLD AUTO: 0.07 THOUSANDS/ÂΜL (ref 0–0.1)
BASOPHILS NFR BLD AUTO: 1 % (ref 0–1)
EOSINOPHIL # BLD AUTO: 0.27 THOUSAND/ÂΜL (ref 0–0.61)
EOSINOPHIL NFR BLD AUTO: 2 % (ref 0–6)
ERYTHROCYTE [DISTWIDTH] IN BLOOD BY AUTOMATED COUNT: 17.3 % (ref 11.6–15.1)
HCT VFR BLD AUTO: 26 % (ref 36.5–49.3)
HGB BLD-MCNC: 8.6 G/DL (ref 12–17)
IMM GRANULOCYTES # BLD AUTO: 0.14 THOUSAND/UL (ref 0–0.2)
IMM GRANULOCYTES NFR BLD AUTO: 1 % (ref 0–2)
LYMPHOCYTES # BLD AUTO: 1.6 THOUSANDS/ÂΜL (ref 0.6–4.47)
LYMPHOCYTES NFR BLD AUTO: 13 % (ref 14–44)
MCH RBC QN AUTO: 31.3 PG (ref 26.8–34.3)
MCHC RBC AUTO-ENTMCNC: 33.1 G/DL (ref 31.4–37.4)
MCV RBC AUTO: 95 FL (ref 82–98)
MONOCYTES # BLD AUTO: 0.84 THOUSAND/ÂΜL (ref 0.17–1.22)
MONOCYTES NFR BLD AUTO: 7 % (ref 4–12)
NEUTROPHILS # BLD AUTO: 9.58 THOUSANDS/ÂΜL (ref 1.85–7.62)
NEUTS SEG NFR BLD AUTO: 76 % (ref 43–75)
NRBC BLD AUTO-RTO: 0 /100 WBCS
PLATELET # BLD AUTO: 239 THOUSANDS/UL (ref 149–390)
PMV BLD AUTO: 9.7 FL (ref 8.9–12.7)
RBC # BLD AUTO: 2.75 MILLION/UL (ref 3.88–5.62)
WBC # BLD AUTO: 12.5 THOUSAND/UL (ref 4.31–10.16)

## 2022-11-23 RX ORDER — ZOLPIDEM TARTRATE 5 MG/1
5 TABLET ORAL
Qty: 7 TABLET | Refills: 0 | Status: SHIPPED | OUTPATIENT
Start: 2022-11-23

## 2022-11-23 NOTE — PROGRESS NOTES
Patient called as he has not slept in 4 days  Sleep disturbance has been an ongoing issue  Previously melatonin and xanax have been utilized without success  Ambien risks and benefits explained to the patient  Seven day prescription provided to assess effectiveness

## 2022-11-25 ENCOUNTER — HOSPITAL ENCOUNTER (OUTPATIENT)
Dept: RADIOLOGY | Facility: HOSPITAL | Age: 77
Discharge: HOME | End: 2022-11-25

## 2022-11-25 ENCOUNTER — HOSPITAL ENCOUNTER (OUTPATIENT)
Dept: INFUSION CENTER | Facility: HOSPITAL | Age: 77
Discharge: HOME | End: 2022-11-25
Attending: INTERNAL MEDICINE

## 2022-11-25 ENCOUNTER — HOSPITAL ENCOUNTER (OUTPATIENT)
Dept: INFUSION CENTER | Facility: HOSPITAL | Age: 77
End: 2022-11-25
Attending: INTERNAL MEDICINE

## 2022-11-25 ENCOUNTER — PREP FOR PROCEDURE (OUTPATIENT)
Dept: INTERVENTIONAL RADIOLOGY/VASCULAR | Facility: CLINIC | Age: 77
End: 2022-11-25

## 2022-11-25 VITALS
SYSTOLIC BLOOD PRESSURE: 136 MMHG | HEART RATE: 85 BPM | RESPIRATION RATE: 16 BRPM | BODY MASS INDEX: 26.1 KG/M2 | DIASTOLIC BLOOD PRESSURE: 80 MMHG | TEMPERATURE: 97.6 F | WEIGHT: 211.64 LBS

## 2022-11-25 DIAGNOSIS — N13.30 BILATERAL HYDRONEPHROSIS: Primary | ICD-10-CM

## 2022-11-25 DIAGNOSIS — C67.3 MALIGNANT NEOPLASM OF ANTERIOR WALL OF URINARY BLADDER (HCC): Primary | ICD-10-CM

## 2022-11-25 DIAGNOSIS — N13.30 BILATERAL HYDRONEPHROSIS: ICD-10-CM

## 2022-11-25 RX ORDER — SODIUM CHLORIDE 9 MG/ML
20 INJECTION, SOLUTION INTRAVENOUS ONCE
Status: COMPLETED | OUTPATIENT
Start: 2022-11-25 | End: 2022-11-25

## 2022-11-25 RX ADMIN — ENFORTUMAB VEDOTIN 120 MG: 30 INJECTION, POWDER, LYOPHILIZED, FOR SOLUTION INTRAVENOUS at 11:46

## 2022-11-25 RX ADMIN — SODIUM CHLORIDE 20 ML/HR: 0.9 INJECTION, SOLUTION INTRAVENOUS at 10:59

## 2022-11-25 RX ADMIN — DEXAMETHASONE SODIUM PHOSPHATE: 10 INJECTION, SOLUTION INTRAMUSCULAR; INTRAVENOUS at 11:02

## 2022-11-25 NOTE — PROGRESS NOTES
Pt tolerated treatment today with no adverse reactions  AVS provided  Left unit ambulatory with a steady gait using rolling walker

## 2022-11-30 ENCOUNTER — OFFICE VISIT (OUTPATIENT)
Dept: INTERNAL MEDICINE CLINIC | Facility: CLINIC | Age: 77
End: 2022-11-30

## 2022-11-30 VITALS
RESPIRATION RATE: 14 BRPM | SYSTOLIC BLOOD PRESSURE: 130 MMHG | HEART RATE: 80 BPM | BODY MASS INDEX: 27.46 KG/M2 | WEIGHT: 214 LBS | DIASTOLIC BLOOD PRESSURE: 76 MMHG | HEIGHT: 74 IN | TEMPERATURE: 98.1 F

## 2022-11-30 DIAGNOSIS — E78.2 MIXED HYPERLIPIDEMIA: ICD-10-CM

## 2022-11-30 DIAGNOSIS — R26.9 NEUROLOGIC GAIT DYSFUNCTION: ICD-10-CM

## 2022-11-30 DIAGNOSIS — F32.1 MODERATE MAJOR DEPRESSION, SINGLE EPISODE (HCC): ICD-10-CM

## 2022-11-30 DIAGNOSIS — I25.118 CORONARY ARTERY DISEASE OF NATIVE ARTERY OF NATIVE HEART WITH STABLE ANGINA PECTORIS (HCC): ICD-10-CM

## 2022-11-30 DIAGNOSIS — E11.42 DIABETIC POLYNEUROPATHY ASSOCIATED WITH TYPE 2 DIABETES MELLITUS (HCC): ICD-10-CM

## 2022-11-30 DIAGNOSIS — E53.8 B12 DEFICIENCY: ICD-10-CM

## 2022-11-30 DIAGNOSIS — Z79.4 TYPE 2 DIABETES MELLITUS WITH STAGE 3 CHRONIC KIDNEY DISEASE, WITH LONG-TERM CURRENT USE OF INSULIN, UNSPECIFIED WHETHER STAGE 3A OR 3B CKD (HCC): ICD-10-CM

## 2022-11-30 DIAGNOSIS — N18.30 TYPE 2 DIABETES MELLITUS WITH STAGE 3 CHRONIC KIDNEY DISEASE, WITH LONG-TERM CURRENT USE OF INSULIN, UNSPECIFIED WHETHER STAGE 3A OR 3B CKD (HCC): ICD-10-CM

## 2022-11-30 DIAGNOSIS — I12.9 HYPERTENSION WITH RENAL DISEASE: ICD-10-CM

## 2022-11-30 DIAGNOSIS — C67.9 BLADDER CARCINOMA (HCC): Primary | ICD-10-CM

## 2022-11-30 DIAGNOSIS — I45.9 STOKES-ADAMS SYNCOPE: ICD-10-CM

## 2022-11-30 DIAGNOSIS — G62.9 NEUROPATHY: ICD-10-CM

## 2022-11-30 DIAGNOSIS — E11.22 TYPE 2 DIABETES MELLITUS WITH STAGE 3 CHRONIC KIDNEY DISEASE, WITH LONG-TERM CURRENT USE OF INSULIN, UNSPECIFIED WHETHER STAGE 3A OR 3B CKD (HCC): ICD-10-CM

## 2022-11-30 RX ORDER — MAGNESIUM 200 MG
TABLET ORAL ONCE
Status: DISCONTINUED | OUTPATIENT
Start: 2022-11-30 | End: 2022-11-30

## 2022-11-30 RX ORDER — CYANOCOBALAMIN 1000 UG/ML
1000 INJECTION, SOLUTION INTRAMUSCULAR; SUBCUTANEOUS ONCE
Status: COMPLETED | OUTPATIENT
Start: 2022-11-30 | End: 2022-11-30

## 2022-11-30 RX ADMIN — CYANOCOBALAMIN 1000 MCG: 1000 INJECTION, SOLUTION INTRAMUSCULAR; SUBCUTANEOUS at 15:00

## 2022-11-30 NOTE — PATIENT INSTRUCTIONS
B12 a 1000 units daily    Every time you come to the office will give your B12 injection a 1000 units

## 2022-11-30 NOTE — PROGRESS NOTES
Assessment/Plan:  B12 injection a 1000 units with these office visit and B12 a 1000 units p o  Daily      1  Hematuria from bladder cancer he was admitted to the hospital hemoglobin is 7 3 I 1 unit of packed cell  He feels better  CT scan showed stable as centric bladder wall thickening on the left involving the left uterovesical her junction compatible with the known neoplasm  There was no hydronephrosis with nephrostomy tube catheter in place    2  Recurrent pleural effusion has been followed by thoracic surgery  3  Continues opioid for chronic pain    4  Renal failure does follow-up with Nephrology  GFR 18 with a creatinine of 3     5  Hyperlipidemia continue on Zetia    6 hypertension continue metoprolol and Lasix also has coronary artery disease stable no angina no heart failure    Around tends given how allergy he fell 4 times he had no apparent injury thank God on evaluation he has no orthostasis I got a blood pressure of 120/70 no change with standing    He had no back pain will on percussion of the spine from the neck all the way to the sacrum    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 he has diminished proprioception diminished pinprick and vibratory sense most likely the reason why he falls will order physical therapy    He had a ara discussion showed with Oncology about his bladder cancer the fact that therapy is palliated if    He looks good otherwise chronically ill man that I had no for quite a long time he is ambulating with a walker well here    He is B12 deficient that can give him also neuropathy and gait dysfunction will start B12 a 1000 units daily every time he comes to the office will give him a B12 injection        TCM Call     Date and time call was made  11/21/2022  3:51 PM    Hospital care reviewed  Records reviewed    Patient was hospitialized at  \[facility]\"" 57  [facility]    Date of Admission  11/07/22    Date of discharge  11/19/22    Diagnosis hematuria    Disposition  Home    Were the patients medications reviewed and updated  Yes    Current Symptoms  None      TCM Call     Post hospital issues  None    Should patient be enrolled in anticoag monitoring? No    Scheduled for follow up? Yes    Not clinically warranted  Re-admitted to [facility]    Did you obtain your prescribed medications  Yes    Do you need help managing your prescriptions or medications  No    Is transportation to your appointment needed  No    I have advised the patient to call PCP with any new or worsening symptoms  [address] or [facility] other    Are you recieving any outpatient services  No    Are you recieving home care services  No    Are you using any community resources  No    Current waiver services  No    Have you fallen in the last 12 months  Yes    Interperter language line needed  No    Counseling  Patient          No problem-specific Assessment & Plan notes found for this encounter  Diagnoses and all orders for this visit:    Bladder carcinoma ([facility] )    Coronary artery disease of native artery of native heart with stable angina pectoris ([facility] )    Hypertension with renal disease    Type 2 diabetes mellitus with stage 3 chronic kidney disease, with long-term current use of insulin, unspecified whether stage 3a or 3b CKD ([place] [address] )    Diabetic polyneuropathy associated with type 2 diabetes mellitus ([facility] )    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    Mixed hyperlipidemia    Moderate major depression, single episode ([facility] )          Subjective:      Patient ID: Delvis Saxena is a 76 y o  male  No chief complaint on file          Current Outpatient Medications:   •  Accu-Chek Softclix Lancets lancets, Test blood sugar 4 times daily, Disp: 200 each, Rfl: 0  •  acetaminophen (TYLENOL) 325 mg tablet, Take 650 mg by mouth every 6 (six) hours as needed for mild pain  , Disp: , Rfl:   •  ARIPiprazole (ABILIFY) 2 mg tablet, Take 1 tablet (2 mg total) by mouth daily, Disp: 30 tablet, Rfl: 0  •  aspirin (ECOTRIN LOW STRENGTH) 81 mg EC tablet, Take 1 tablet (81 mg total) by mouth daily, Disp: , Rfl:   •  Azelastine HCl 0 15 % SOLN, Inhale 1 spray 2 (two) times a day, Disp: 30 mL, Rfl: 3  •  Bimatoprost (LUMIGAN OP), Apply 1 drop to eye daily at bedtime , Disp: , Rfl:   •  calcitriol (ROCALTROL) 0 25 mcg capsule, Take 1 capsule (0 25 mcg total) by mouth daily, Disp: 90 capsule, Rfl: 0  •  cetirizine (ZyrTEC) 10 MG chewable tablet, Chew 10 mg daily, Disp: , Rfl:   •  DULoxetine (CYMBALTA) 30 mg delayed release capsule, TAKE ONE CAPSULE BY MOUTH EVERY DAY, Disp: 90 capsule, Rfl: 0  •  ezetimibe (ZETIA) 10 mg tablet, Take 1 tablet (10 mg total) by mouth daily, Disp: 90 tablet, Rfl: 1  •  Ferrous Sulfate Dried (Feosol) 200 (65 Fe) MG TABS, Take 65 mg by mouth daily, Disp: 90 tablet, Rfl: 0  •  fluocinonide (LIDEX) 0 05 % cream, Apply topically as needed for irritation, Disp: , Rfl:   •  fluticasone (FLONASE) 50 mcg/act nasal spray, 1 spray into each nostril as needed for allergies, Disp: , Rfl:   •  furosemide (LASIX) 20 mg tablet, Take 1 tablet (20 mg total) by mouth daily, Disp: 90 tablet, Rfl: 1  •  gabapentin (NEURONTIN) 300 mg capsule, TAKE ONE CAPSULE BY MOUTH EVERY DAY AT BEDTIME, Disp: 90 capsule, Rfl: 0  •  glucose blood (Accu-Chek Martha Plus) test strip, Test blood sugar 4 times daily, Disp: 200 strip, Rfl: 0  •  hydrocortisone 2 5 % cream, Apply topically 2 (two) times a day as needed for irritation or rash, Disp: 30 g, Rfl: 0  •  Insulin Pen Needle 31G X 8 MM MISC, Inject 3 times a day, Disp: 100 each, Rfl: 2  •  isosorbide mononitrate (IMDUR) 30 mg 24 hr tablet, TAKE ONE TABLET BY MOUTH EVERY DAY IN THE MORNING, Disp: 90 tablet, Rfl: 0  •  Lantus SoloStar 100 units/mL injection pen, 18 units qhs (Patient taking differently: Inject 18 Units under the skin daily at bedtime), Disp: 30 mL, Rfl: 1  •  lidocaine (XYLOCAINE) 2 % topical gel, Apply topically as needed for mild pain, Disp: 30 mL, Rfl: 0  •  lidocaine-prilocaine (EMLA) cream, Apply topically as needed for mild pain, Disp: 30 g, Rfl: 0  •  loperamide (IMODIUM) 2 mg capsule, Take 2 mg by mouth 4 (four) times a day as needed for diarrhea, Disp: , Rfl:   •  metoprolol succinate (TOPROL-XL) 100 mg 24 hr tablet, TAKE ONE TABLET BY MOUTH EVERY DAY, Disp: 90 tablet, Rfl: 0  •  multivitamin (THERAGRAN) TABS, Take 1 tablet by mouth daily  , Disp: , Rfl:   •  naloxone (NARCAN) 4 mg/0 1 mL nasal spray, Administer 1 spray into a nostril   If no response after 2-3 minutes, give another dose in the other nostril using a new spray , Disp: 1 each, Rfl: 1  •  NovoLOG FlexPen 100 units/mL injection pen, 10 units with each meal  (Patient taking differently: Inject 10 Units under the skin 3 (three) times a day with meals), Disp: 5 pen, Rfl: 1  •  omeprazole (PriLOSEC) 20 mg delayed release capsule, Take 1 capsule (20 mg total) by mouth daily in the early morning PRN, Disp: 90 capsule, Rfl: 0  •  ondansetron (Zofran ODT) 8 mg disintegrating tablet, Take 1 tablet (8 mg total) by mouth every 8 (eight) hours as needed for nausea or vomiting, Disp: 20 tablet, Rfl: 0  •  oxybutynin (DITROPAN-XL) 10 MG 24 hr tablet, TAKE ONE TABLET BY MOUTH EVERY DAY, Disp: 30 tablet, Rfl: 0  •  oxyCODONE (OxyCONTIN) 10 mg 12 hr tablet, Take 1 tablet (10 mg total) by mouth every 8 (eight) hours Max Daily Amount: 30 mg, Disp: 90 tablet, Rfl: 0  •  oxyCODONE (Roxicodone) 5 immediate release tablet, Take 1 tablet (5 mg total) by mouth every 4 (four) hours as needed for moderate pain Max Daily Amount: 30 mg, Disp: 60 tablet, Rfl: 0  •  Patiromer Sorbitex Calcium 8 4 g PACK, Take 8 4 packets by mouth, Disp: , Rfl:   •  phenazopyridine (PYRIDIUM) 200 mg tablet, Take 1 tablet (200 mg total) by mouth 3 (three) times a day with meals, Disp: 10 tablet, Rfl: 0  •  polyethylene glycol (MIRALAX) 17 g packet, Take 17 g by mouth daily, Disp: 30 each, Rfl: 0  • predniSONE 20 mg tablet, Take 1 tablet (20 mg total) by mouth daily, Disp: 5 tablet, Rfl: 0  •  senna (SENOKOT) 8 6 MG tablet, Take 2 tablets (17 2 mg total) by mouth 2 (two) times a day, Disp: 90 tablet, Rfl: 0  •  sodium chloride, PF, 0 9 %, 10 mL by Intracatheter route daily Intracatheter flushing daily (Patient not taking: Reported on 11/22/2022), Disp: 900 mL, Rfl: 0  •  sodium chloride, PF, 0 9 %, 10 mL by Intracatheter route daily Intracatheter flushing daily (Patient not taking: Reported on 11/22/2022), Disp: 900 mL, Rfl: 0  •  sodium chloride, PF, 0 9 %, 10 mL by Intracatheter route daily Bilateral PCNs to be flushed daily with prefilled 10cc NS, Disp: 600 mL, Rfl: 3  •  tamsulosin (FLOMAX) 0 4 mg, Take 1 capsule (0 4 mg total) by mouth daily with dinner, Disp: 90 capsule, Rfl: 3  •  zolpidem (AMBIEN) 5 mg tablet, Take 1 tablet (5 mg total) by mouth daily at bedtime as needed for sleep, Disp: 7 tablet, Rfl: 0    HPI    The following portions of the patient's history were reviewed and updated as appropriate: allergies, current medications, past family history, past medical history, past social history, past surgical history and problem list     Review of Systems   Constitutional: Negative  Negative for activity change, appetite change, fatigue, fever and unexpected weight change  HENT: Negative for congestion, ear pain, hearing loss, mouth sores, postnasal drip, rhinorrhea, sore throat, trouble swallowing and voice change  Eyes: Negative for pain, redness and visual disturbance  Respiratory: Negative for cough, chest tightness, shortness of breath and wheezing  Cardiovascular: Negative for chest pain, palpitations and leg swelling  Gastrointestinal: Negative for abdominal distention, abdominal pain, blood in stool, constipation, diarrhea and nausea  Endocrine: Negative for cold intolerance, heat intolerance, polydipsia, polyphagia and polyuria     Genitourinary: Negative for difficulty urinating, dysuria, flank pain, frequency, hematuria and urgency  Musculoskeletal: Negative for arthralgias, back pain, gait problem, joint swelling and myalgias  Skin: Negative for color change and pallor  Neurological: Negative for dizziness, tremors, seizures, syncope, weakness, numbness and headaches  Hematological: Negative for adenopathy  Does not bruise/bleed easily  Psychiatric/Behavioral: Negative  Negative for sleep disturbance  The patient is not nervous/anxious  Objective: There were no vitals taken for this visit  Physical Exam  Vitals and nursing note reviewed  Constitutional:       Appearance: He is well-developed and well-nourished  HENT:      Head: Normocephalic  Right Ear: External ear normal       Left Ear: External ear normal       Nose: Nose normal       Mouth/Throat:      Mouth: Oropharynx is clear and moist       Pharynx: No oropharyngeal exudate  Eyes:      Extraocular Movements: EOM normal       Conjunctiva/sclera: Conjunctivae normal       Pupils: Pupils are equal, round, and reactive to light  Neck:      Thyroid: No thyromegaly  Cardiovascular:      Rate and Rhythm: Normal rate and regular rhythm  Pulses: Intact distal pulses  Heart sounds: Normal heart sounds  No murmur heard  No friction rub  No gallop  Comments: S1-S2 regular rhythm  Extremities no edema  Pulmonary:      Effort: Pulmonary effort is normal  No respiratory distress  Breath sounds: Normal breath sounds  No wheezing or rales  Comments: Lungs are clear no wheezing rales or rhonchi  Abdominal:      General: Bowel sounds are normal  There is no distension  Palpations: Abdomen is soft  There is no mass  Tenderness: There is no abdominal tenderness  There is no guarding or rebound  Musculoskeletal:         General: Normal range of motion  Cervical back: Normal range of motion and neck supple     Lymphadenopathy:      Cervical: No cervical adenopathy  Skin:     General: Skin is warm and dry  Neurological:      Mental Status: He is alert and oriented to person, place, and time  Sensory: Sensory deficit present  Coordination: Coordination normal       Deep Tendon Reflexes:      Reflex Scores:       Patellar reflexes are 0 on the right side and 0 on the left side  Achilles reflexes are 0 on the right side and 0 on the left side    Psychiatric:         Mood and Affect: Mood and affect normal          Behavior: Behavior normal          Judgment: Judgment normal

## 2022-12-01 ENCOUNTER — HOSPITAL ENCOUNTER (OUTPATIENT)
Dept: INFUSION CENTER | Facility: HOSPITAL | Age: 77
End: 2022-12-01
Attending: INTERNAL MEDICINE

## 2022-12-01 VITALS — TEMPERATURE: 98.1 F

## 2022-12-01 DIAGNOSIS — C67.3 MALIGNANT NEOPLASM OF ANTERIOR WALL OF URINARY BLADDER (HCC): Primary | ICD-10-CM

## 2022-12-01 DIAGNOSIS — Z95.828 PORT-A-CATH IN PLACE: ICD-10-CM

## 2022-12-01 LAB
BASOPHILS # BLD AUTO: 0.03 THOUSANDS/ÂΜL (ref 0–0.1)
BASOPHILS NFR BLD AUTO: 0 % (ref 0–1)
EOSINOPHIL # BLD AUTO: 0.21 THOUSAND/ÂΜL (ref 0–0.61)
EOSINOPHIL NFR BLD AUTO: 3 % (ref 0–6)
ERYTHROCYTE [DISTWIDTH] IN BLOOD BY AUTOMATED COUNT: 17.7 % (ref 11.6–15.1)
HCT VFR BLD AUTO: 25.3 % (ref 36.5–49.3)
HGB BLD-MCNC: 8.5 G/DL (ref 12–17)
IMM GRANULOCYTES # BLD AUTO: 0.03 THOUSAND/UL (ref 0–0.2)
IMM GRANULOCYTES NFR BLD AUTO: 0 % (ref 0–2)
LYMPHOCYTES # BLD AUTO: 1.09 THOUSANDS/ÂΜL (ref 0.6–4.47)
LYMPHOCYTES NFR BLD AUTO: 13 % (ref 14–44)
MCH RBC QN AUTO: 31.8 PG (ref 26.8–34.3)
MCHC RBC AUTO-ENTMCNC: 33.6 G/DL (ref 31.4–37.4)
MCV RBC AUTO: 95 FL (ref 82–98)
MONOCYTES # BLD AUTO: 0.86 THOUSAND/ÂΜL (ref 0.17–1.22)
MONOCYTES NFR BLD AUTO: 10 % (ref 4–12)
NEUTROPHILS # BLD AUTO: 6.15 THOUSANDS/ÂΜL (ref 1.85–7.62)
NEUTS SEG NFR BLD AUTO: 74 % (ref 43–75)
NRBC BLD AUTO-RTO: 0 /100 WBCS
PLATELET # BLD AUTO: 245 THOUSANDS/UL (ref 149–390)
PMV BLD AUTO: 9.8 FL (ref 8.9–12.7)
RBC # BLD AUTO: 2.67 MILLION/UL (ref 3.88–5.62)
WBC # BLD AUTO: 8.37 THOUSAND/UL (ref 4.31–10.16)

## 2022-12-01 NOTE — PROGRESS NOTES
Central labs drawn per orders  Port flushed freely  Good blood return noted  Port deaccessed without issue  Patient tolerated well  AVS declined  Patient is aware of next appointment  Patient ambulated off unit without incident  All personal belongings taken with patient

## 2022-12-02 ENCOUNTER — HOSPITAL ENCOUNTER (OUTPATIENT)
Dept: INFUSION CENTER | Facility: HOSPITAL | Age: 77
End: 2022-12-02
Attending: INTERNAL MEDICINE

## 2022-12-02 ENCOUNTER — TELEPHONE (OUTPATIENT)
Dept: HEMATOLOGY ONCOLOGY | Facility: CLINIC | Age: 77
End: 2022-12-02

## 2022-12-02 VITALS
HEIGHT: 74 IN | WEIGHT: 211.64 LBS | TEMPERATURE: 98.6 F | SYSTOLIC BLOOD PRESSURE: 146 MMHG | HEART RATE: 99 BPM | DIASTOLIC BLOOD PRESSURE: 69 MMHG | BODY MASS INDEX: 27.16 KG/M2 | RESPIRATION RATE: 16 BRPM

## 2022-12-02 DIAGNOSIS — C67.3 MALIGNANT NEOPLASM OF ANTERIOR WALL OF URINARY BLADDER (HCC): Primary | ICD-10-CM

## 2022-12-02 RX ORDER — SODIUM CHLORIDE 9 MG/ML
20 INJECTION, SOLUTION INTRAVENOUS ONCE
OUTPATIENT
Start: 2022-12-16

## 2022-12-02 RX ORDER — SODIUM CHLORIDE 9 MG/ML
20 INJECTION, SOLUTION INTRAVENOUS ONCE
Status: COMPLETED | OUTPATIENT
Start: 2022-12-02 | End: 2022-12-02

## 2022-12-02 RX ORDER — SODIUM CHLORIDE 9 MG/ML
20 INJECTION, SOLUTION INTRAVENOUS ONCE
OUTPATIENT
Start: 2022-12-30

## 2022-12-02 RX ORDER — SODIUM CHLORIDE 9 MG/ML
20 INJECTION, SOLUTION INTRAVENOUS ONCE
OUTPATIENT
Start: 2022-12-23

## 2022-12-02 RX ADMIN — DEXAMETHASONE SODIUM PHOSPHATE: 10 INJECTION, SOLUTION INTRAMUSCULAR; INTRAVENOUS at 10:56

## 2022-12-02 RX ADMIN — SODIUM CHLORIDE 20 ML/HR: 0.9 INJECTION, SOLUTION INTRAVENOUS at 10:56

## 2022-12-02 RX ADMIN — ENFORTUMAB VEDOTIN 100 MG: 30 INJECTION, POWDER, LYOPHILIZED, FOR SOLUTION INTRAVENOUS at 11:51

## 2022-12-02 NOTE — PROGRESS NOTES
Medication time out performed with Isai Lanza RN via telephone for changes to today's treatment  Title: PADCEV    Date patient scheduled: 12/2/2022    Original medication ordered: PADCEV 1 25mg/kg    New Medication ordered: PADCEV 1 mg/kg      Office RN to route to [facility]  Infusion  pool routes to [facility]  Infusion tech to receive message, confirm scheduled treatment duration matches ordered treatment duration or adjust accordingly, and re-link appointment request orders  Infusion tech to notify pharmacy and finance

## 2022-12-02 NOTE — PROGRESS NOTES
Pt tolerated treatment today with no adverse reactions  Instructed pt to continue to use his walker at all times and to follow up with his PCP for his arm pain from falling  Left unit ambulatory with a steady gait

## 2022-12-02 NOTE — PROGRESS NOTES
Notified Jessica Abebe RN that  Pt is here today for his Padcev  He wanted to let the office know that he has been falling often, recently  He said his legs are very weak and give out on him and he falls  He denies dizziness  Pt reports to be eating and drinking adequately and his VS are stable  Per Magdalene she will discuss with Dr Abilio Ludwig

## 2022-12-02 NOTE — TELEPHONE ENCOUNTER
Medication time out performed with Rubén Doherty RN via telephone for changes to today's treatment  Title: PADCEV    Date patient scheduled: 12/2/2022    Original medication ordered: PADCEV 1 25mg/kg    New Medication ordered: PADCEV 1 mg/kg    Office RN to route to [facility]  Infusion  pool routes to [facility]      Infusion tech to receive message, confirm scheduled treatment duration matches ordered treatment duration or adjust accordingly, and re-link appointment request orders      Infusion tech to notify pharmacy and finance

## 2022-12-02 NOTE — TELEPHONE ENCOUNTER
Received notification from infusion RN stating patient is here today for his Padcev  He wanted to let the office know that he has been falling often recently  He said his legs are very weak and give out on him and he falls  He denies dizziness  He states he fell on his arm  He is verbalizing arm pain of 6/10  Patient states he is eating and drinking, with stable vital signs  States he does use a walker at home but did not use when he fell  Encouraged walker use at all times  Dr Kimmy Snowden recommending patient to go to urgent care/PCP for follow up with falling and arm pain, and today's treatment dose reduced  Dr Cherelle Boyer recommendations communicated to patient via infusion RN

## 2022-12-05 ENCOUNTER — HOSPITAL ENCOUNTER (OUTPATIENT)
Dept: NON INVASIVE DIAGNOSTICS | Facility: HOSPITAL | Age: 77
Discharge: HOME | End: 2022-12-05

## 2022-12-05 DIAGNOSIS — I25.118 CORONARY ARTERY DISEASE OF NATIVE ARTERY OF NATIVE HEART WITH STABLE ANGINA PECTORIS (HCC): ICD-10-CM

## 2022-12-05 DIAGNOSIS — N18.6 END STAGE KIDNEY DISEASE (HCC): ICD-10-CM

## 2022-12-05 DIAGNOSIS — N18.9 CHRONIC KIDNEY DISEASE, UNSPECIFIED CKD STAGE: ICD-10-CM

## 2022-12-05 RX ORDER — ISOSORBIDE MONONITRATE 30 MG/1
TABLET, EXTENDED RELEASE ORAL
Qty: 90 TABLET | Refills: 0 | Status: ON HOLD | OUTPATIENT
Start: 2022-12-05

## 2022-12-07 DIAGNOSIS — N13.30 BILATERAL HYDRONEPHROSIS: ICD-10-CM

## 2022-12-07 RX ORDER — OXYBUTYNIN CHLORIDE 10 MG/1
TABLET, EXTENDED RELEASE ORAL
Qty: 30 TABLET | Refills: 0 | Status: ON HOLD | OUTPATIENT
Start: 2022-12-07

## 2022-12-09 ENCOUNTER — EVALUATION (OUTPATIENT)
Dept: PHYSICAL THERAPY | Facility: REHABILITATION | Age: 77
End: 2022-12-09

## 2022-12-09 ENCOUNTER — TELEPHONE (OUTPATIENT)
Dept: OTHER | Facility: OTHER | Age: 77
End: 2022-12-09

## 2022-12-09 ENCOUNTER — TELEPHONE (OUTPATIENT)
Dept: GYNECOLOGIC ONCOLOGY | Facility: CLINIC | Age: 77
End: 2022-12-09

## 2022-12-09 DIAGNOSIS — R53.1 WEAKNESS: ICD-10-CM

## 2022-12-09 DIAGNOSIS — R31.0 GROSS HEMATURIA: Primary | ICD-10-CM

## 2022-12-09 DIAGNOSIS — R26.9 NEUROLOGIC GAIT DYSFUNCTION: Primary | ICD-10-CM

## 2022-12-09 DIAGNOSIS — R29.6 FALLS FREQUENTLY: ICD-10-CM

## 2022-12-09 DIAGNOSIS — G62.9 NEUROPATHY: ICD-10-CM

## 2022-12-09 NOTE — TELEPHONE ENCOUNTER
Returned call to patient   Reports burning ,dark wine colored urine, blood clots started Monday  With every void   No fever temp 98 7  Report lower pelvic pain as well  Advised patient due to reported symptoms and due to upcoming weekend and end of office hours he should go to [facility] for evaluation  Patient state that he would like to see if he can have urine testing , has had a lot urinary tract infections  Advised patient that I would speak with Dr Ana Petty to see if we would place orders for testing  Per Dr Ana Petty patient due to patient history of bladder cancer with Mets and history of radiation treatment we can place orders for urine testing , however if his symptoms should worsen he should go to [facility] for evaluation and treatment for bleeding  Returned call to patient and advised per providers recommendations   Patient verbalized understanding

## 2022-12-09 NOTE — TELEPHONE ENCOUNTER
Patient's wife (Kristal De Leon) called into the office to speak with someone regarding the next steps and to give an update on the drainage of his tube  Aquiles Lazaro states that that during week 1 it was 40ML, the second week was 30ML, and this week was 10ML  Aquiles Lazaro states that she is draining the tubes once a week  Aquiles Lazaro gave this number to give a call back   [phone]

## 2022-12-09 NOTE — PROGRESS NOTES
PT Evaluation     Name: Francisco Zamora  Date: 22  : 1945  Referring Provider: Kiran Bowen MD  AUTHORIZATION:   Insurance: Payor: Paulina Pranay / Plan: MEDICARE A AND B / Product Type: Medicare A & B Fee for Service /   1 of 16visits through 23, PN due 23      SUBJECTIVE:  HPI: Francisco Zamora is a 76 y o  male referred to outpatient physical therapy for the following diagnosis   Encounter Diagnoses   Name Primary? • Neurologic gait dysfunction Yes   • Neuropathy    • Weakness    • Falls frequently           Patient reportshas been using his high walker for a couple of years due to spinal stenosis  Has had various surgeries in the last years, but a total of 50 surgeries  ,He reports that in the last few months he fell while walking short dstances  "My legs give out"  Most of his falls have been in the bathroom  He did not have a walker  He recently was given a high walker  He did fall in Thanksgiving x 3  Reports left shoulder pain towards wrist 6/10  Home Environment: lives with wife and sister in law  Bedroom and bathroom on [facility] room on first floor  1 Step to enter into the house  14 steps to the second floor  Has 2 stair glides, to the basement and to the second floor  Patient-Specific Functional Scale   Task is scored 0 (unable to perform activity) to 10 (ability to perform activity independently)  Activity     1  Walk around the kitchen in his walker 7    2  Taking a shower 5    3  Getting in and out of the car/toilet 5    4     Walking outdoors  2         Past Medical History:   Diagnosis Date   • Anemia     Last assessed: 17   • Anxiety    • Arteriosclerotic cardiovascular disease     Last assessed: 17   • Arthritis    • Bladder cancer ([facility] [address] )     bladder- had BCG treatments   • Chronic kidney disease     Stage IV   • CKD (chronic kidney disease) stage 4, GFR 15-29 ml/min ([facility])    • Colon polyp    • Coronary artery disease     7 stents   • Depression    • Diabetes mellitus ([facility] )     IDDM   • GERD (gastroesophageal reflux disease)    • Glaucoma    • Hematuria    • History of fusion of cervical spine    • Hyperlipidemia    • Hypertension    • Insomnia     Last assessed: 11/14/12   • Loss of hearing     has hearing aids but usually does not wear them   • Lung cancer [facility])    • Metastatic cancer ([facility] )    • Other seasonal allergic rhinitis     Last assessed: 2/10/16   • PAD (peripheral artery disease) ([facility])    • Shortness of breath     on exertion   • Spinal stenosis of lumbar region    • Transient cerebral ischemia     No Residual   • Uses walker     w/c for longer distances       Current Outpatient Medications:   •  Accu-Chek Softclix Lancets lancets, Test blood sugar 4 times daily, Disp: 200 each, Rfl: 0  •  acetaminophen (TYLENOL) 325 mg tablet, Take 650 mg by mouth every 6 (six) hours as needed for mild pain  , Disp: , Rfl:   •  ARIPiprazole (ABILIFY) 2 mg tablet, Take 1 tablet (2 mg total) by mouth daily, Disp: 30 tablet, Rfl: 0  •  aspirin (ECOTRIN LOW STRENGTH) 81 mg EC tablet, Take 1 tablet (81 mg total) by mouth daily, Disp: , Rfl:   •  Azelastine HCl 0 15 % SOLN, Inhale 1 spray 2 (two) times a day, Disp: 30 mL, Rfl: 3  •  Bimatoprost (LUMIGAN OP), Apply 1 drop to eye daily at bedtime , Disp: , Rfl:   •  calcitriol (ROCALTROL) 0 25 mcg capsule, Take 1 capsule (0 25 mcg total) by mouth daily, Disp: 90 capsule, Rfl: 0  •  cetirizine (ZyrTEC) 10 MG chewable tablet, Chew 10 mg daily, Disp: , Rfl:   •  cyanocobalamin (VITAMIN B-12) 1000 MCG tablet, Take 1 tablet (1,000 mcg total) by mouth daily, Disp: 30 tablet, Rfl: 2  •  DULoxetine (CYMBALTA) 30 mg delayed release capsule, TAKE ONE CAPSULE BY MOUTH EVERY DAY, Disp: 90 capsule, Rfl: 0  •  ezetimibe (ZETIA) 10 mg tablet, Take 1 tablet (10 mg total) by mouth daily, Disp: 90 tablet, Rfl: 1  •  Ferrous Sulfate Dried (Feosol) 200 (65 Fe) MG TABS, Take 65 mg by mouth daily, Disp: 90 tablet, Rfl: 0  • fluocinonide (LIDEX) 0 05 % cream, Apply topically as needed for irritation, Disp: , Rfl:   •  fluticasone (FLONASE) 50 mcg/act nasal spray, 1 spray into each nostril as needed for allergies, Disp: , Rfl:   •  furosemide (LASIX) 20 mg tablet, Take 1 tablet (20 mg total) by mouth daily, Disp: 90 tablet, Rfl: 1  •  gabapentin (NEURONTIN) 300 mg capsule, TAKE ONE CAPSULE BY MOUTH EVERY DAY AT BEDTIME, Disp: 90 capsule, Rfl: 0  •  glucose blood (Accu-Chek Martha Plus) test strip, Test blood sugar 4 times daily, Disp: 200 strip, Rfl: 0  •  hydrocortisone 2 5 % cream, Apply topically 2 (two) times a day as needed for irritation or rash, Disp: 30 g, Rfl: 0  •  Insulin Pen Needle 31G X 8 MM MISC, Inject 3 times a day, Disp: 100 each, Rfl: 2  •  isosorbide mononitrate (IMDUR) 30 mg 24 hr tablet, TAKE ONE TABLET BY MOUTH EVERY DAY IN THE MORNING, Disp: 90 tablet, Rfl: 0  •  Lantus SoloStar 100 units/mL injection pen, 18 units qhs (Patient taking differently: Inject 18 Units under the skin daily at bedtime), Disp: 30 mL, Rfl: 1  •  lidocaine (XYLOCAINE) 2 % topical gel, Apply topically as needed for mild pain, Disp: 30 mL, Rfl: 0  •  lidocaine-prilocaine (EMLA) cream, Apply topically as needed for mild pain, Disp: 30 g, Rfl: 0  •  loperamide (IMODIUM) 2 mg capsule, Take 2 mg by mouth 4 (four) times a day as needed for diarrhea, Disp: , Rfl:   •  metoprolol succinate (TOPROL-XL) 100 mg 24 hr tablet, TAKE ONE TABLET BY MOUTH EVERY DAY, Disp: 90 tablet, Rfl: 0  •  multivitamin (THERAGRAN) TABS, Take 1 tablet by mouth daily  , Disp: , Rfl:   •  naloxone (NARCAN) 4 mg/0 1 mL nasal spray, Administer 1 spray into a nostril   If no response after 2-3 minutes, give another dose in the other nostril using a new spray , Disp: 1 each, Rfl: 1  •  NovoLOG FlexPen 100 units/mL injection pen, 10 units with each meal  (Patient taking differently: Inject 10 Units under the skin 3 (three) times a day with meals), Disp: 5 pen, Rfl: 1  •  omeprazole (PriLOSEC) 20 mg delayed release capsule, Take 1 capsule (20 mg total) by mouth daily in the early morning PRN, Disp: 90 capsule, Rfl: 0  •  ondansetron (Zofran ODT) 8 mg disintegrating tablet, Take 1 tablet (8 mg total) by mouth every 8 (eight) hours as needed for nausea or vomiting, Disp: 20 tablet, Rfl: 0  •  oxybutynin (DITROPAN-XL) 10 MG 24 hr tablet, TAKE ONE TABLET BY MOUTH EVERY DAY, Disp: 30 tablet, Rfl: 0  •  oxyCODONE (OxyCONTIN) 10 mg 12 hr tablet, Take 1 tablet (10 mg total) by mouth every 8 (eight) hours Max Daily Amount: 30 mg, Disp: 90 tablet, Rfl: 0  •  oxyCODONE (Roxicodone) 5 immediate release tablet, Take 1 tablet (5 mg total) by mouth every 4 (four) hours as needed for moderate pain Max Daily Amount: 30 mg, Disp: 60 tablet, Rfl: 0  •  Patiromer Sorbitex Calcium 8 4 g PACK, Take 8 4 packets by mouth, Disp: , Rfl:   •  phenazopyridine (PYRIDIUM) 200 mg tablet, Take 1 tablet (200 mg total) by mouth 3 (three) times a day with meals, Disp: 10 tablet, Rfl: 0  •  polyethylene glycol (MIRALAX) 17 g packet, Take 17 g by mouth daily, Disp: 30 each, Rfl: 0  •  predniSONE 20 mg tablet, Take 1 tablet (20 mg total) by mouth daily, Disp: 5 tablet, Rfl: 0  •  senna (SENOKOT) 8 6 MG tablet, Take 2 tablets (17 2 mg total) by mouth 2 (two) times a day, Disp: 90 tablet, Rfl: 0  •  sodium chloride, PF, 0 9 %, 10 mL by Intracatheter route daily Intracatheter flushing daily (Patient not taking: Reported on 11/22/2022), Disp: 900 mL, Rfl: 0  •  sodium chloride, PF, 0 9 %, 10 mL by Intracatheter route daily Intracatheter flushing daily (Patient not taking: Reported on 11/22/2022), Disp: 900 mL, Rfl: 0  •  sodium chloride, PF, 0 9 %, 10 mL by Intracatheter route daily Bilateral PCNs to be flushed daily with prefilled 10cc NS, Disp: 600 mL, Rfl: 3  •  tamsulosin (FLOMAX) 0 4 mg, Take 1 capsule (0 4 mg total) by mouth daily with dinner, Disp: 90 capsule, Rfl: 3  •  zolpidem (AMBIEN) 5 mg tablet, Take 1 tablet (5 mg total) by mouth daily at bedtime as needed for sleep, Disp: 7 tablet, Rfl: 0  12/9/2022      Objective   Bp 120/69  HR 93      Sensation  Decreased BLE        Core Movement Tasks Description of task    Sitting Abnormal - sacral sitting   Sitting to Standing Posterior losses of balance, Unable without use of arms and Poor control in descent   Standing Uses assistive device   Walking Decreased foot clearance, Lacks heel strike, Lacks knee extension during stance phase gait, Decreased hip extension, Decreased trunk rotation and Decreased arm swing   Step-up Held: stair glide and ramps at home       AROM Shoulder Left Right   Flexion 65 with pain 85   Extension 20 30   Elbow  [facility]   Hand [facility]     Muscle Strength Shoulder Left  3-/5 Right  3-/5   Elbow 3+/5 3+/5   Hand 4-/5 4-/5       AROM Hip Left Right   Flexion 90 90   Extension 0 0   Abduction 20 20   External Rotation 20 20         AROM  Knee Left Right   Flexion 110 110   Extension 15 15     AROM Ankle Left Right   Doriflexion 0 0   Plantarflexion 20 20       Manual Muscle Testing - Hip Left Right   Flexion 3- 3-   Extension 3- 3-   Abduction 3+ 3+   External Rotation 3+ 3+     Manual Muscle Testing - Knee Left Right   Flexion 3+ 3+   Extension 3+ 3+     Manual Muscle Testing - Ankle Left Right   Doriflexion 2- 2-   Plantarflexion 3- 3-          Mobility Measures 12/09/22   Assistive device used? Cane   Walking speed   89 meters/second   2 minute walk test  230'   108 Hr 98%   6 Minute Walk Test (100 foot circular course) Pre-exercise /69  Pre-exercise heart rate 93    300 feet 2:37 minutes     30 sec sit to stand  2 repetititos with use of hands   5 times sit to stand Unable to perfor 5 reps    Performed  X 45 seconds seconds   Patient-Reported Outcome Measure:   Stroke Impact Scale  Patient Specific Functional Scale (PSFS) 19/40               MCTSIB Sway Index   Feet Together, Eyes Open 17   Feet Together, Eyes Closed  5   Feet Together, Eyes Open Foam 0 Feet Together, Eyes Closed Foam 0       Assessment/Plan    Assessment:  Patient is a very pleasant 76year old male with a lengthy PMH who was referred to outpatient physical therapy s/p multiple falls  Upon assessment he presents with decreased strength, decreased balance, decreased endurance, increased risk of falls and gait deviations including those listed in observation section  These impairments are limiting the patient's ability to perform Ind ADls and care for self  Patient will benefit from skilled outpatient physical therapy to address the above impairments in order to improve patient independence and safety in home and the community  STG's: 1 month    - Patient improves EC on all task of MCSTIB  By 20 sec for improved static balance     - Patient is independent with initial home exercise program for improvements at home within 2 weeks    - Patient ambulates for 6 consecutive min with appropriate vital sign response for improved endurance within 4 weeks  LTG's:   - Patient improves distance ambulated on 6MWT by 30% within 8 weeks for improved endurance   - Patient decreased time to complete 5 x sit to stand by 15 secondsfor increased muscle strength  for decreased risk of falls   - Patient improves gait speed to within 20% of age matched norms within 8 weeks   - Patient improves score on PSFS for improved quality of life  Plan:  Patient will benefit from skilled outpatient physical therapy 1-2x/wk for 3 months  Therapeutic exercises, Neuromuscular re-education, there activity and developing HEP        Precautions: lung Ca, neuropathy, falls, metastatic ca,       12/09            Neuro Re-Ed MCTSIB             6MWT                                                                                          Ther Ex

## 2022-12-09 NOTE — TELEPHONE ENCOUNTER
Attempted to contact patient   No answer left voice message for patient to return call to our office

## 2022-12-09 NOTE — TELEPHONE ENCOUNTER
Spoke with patient's wife  Instructed her to drain once every other week  For a couple of weeks and re discuss further steps then, since he is still draining  She is in agreement with the plan

## 2022-12-09 NOTE — TELEPHONE ENCOUNTER
Pt called in stating he gets UTI's frequently and he believes he has one now  He is requesting an order be placed in his chart so he can go to the lab  He's requesting a call back

## 2022-12-09 NOTE — TELEPHONE ENCOUNTER
Patient is calling in regarding his original call this morning regarding lab orders for a urine test  He was hoping to have the orders in so he can go get them done today

## 2022-12-12 ENCOUNTER — HOSPITAL ENCOUNTER (INPATIENT)
Facility: HOSPITAL | Age: 77
LOS: 10 days | Discharge: NURSING, REHAB OR LONG-TERM CARE | End: 2022-12-22
Attending: EMERGENCY MEDICINE | Admitting: INTERNAL MEDICINE

## 2022-12-12 ENCOUNTER — APPOINTMENT (EMERGENCY)
Dept: RADIOLOGY | Facility: HOSPITAL | Age: 77
End: 2022-12-12

## 2022-12-12 DIAGNOSIS — N17.9 ACUTE KIDNEY INJURY SUPERIMPOSED ON CKD (HCC): ICD-10-CM

## 2022-12-12 DIAGNOSIS — Z79.4 TYPE 2 DIABETES MELLITUS WITH CHRONIC KIDNEY DISEASE, WITH LONG-TERM CURRENT USE OF INSULIN, UNSPECIFIED CKD STAGE (HCC): ICD-10-CM

## 2022-12-12 DIAGNOSIS — R31.9 HEMATURIA: ICD-10-CM

## 2022-12-12 DIAGNOSIS — G56.31 RADIAL NERVE PALSY, RIGHT: ICD-10-CM

## 2022-12-12 DIAGNOSIS — N39.0 URINARY TRACT INFECTION: Primary | ICD-10-CM

## 2022-12-12 DIAGNOSIS — C67.9 BLADDER CANCER (HCC): ICD-10-CM

## 2022-12-12 DIAGNOSIS — N18.9 ACUTE KIDNEY INJURY SUPERIMPOSED ON CKD (HCC): ICD-10-CM

## 2022-12-12 DIAGNOSIS — E11.22 TYPE 2 DIABETES MELLITUS WITH CHRONIC KIDNEY DISEASE, WITH LONG-TERM CURRENT USE OF INSULIN, UNSPECIFIED CKD STAGE (HCC): ICD-10-CM

## 2022-12-12 DIAGNOSIS — R60.0 EDEMA OF RIGHT UPPER ARM: ICD-10-CM

## 2022-12-12 DIAGNOSIS — M21.331 RIGHT WRIST DROP: ICD-10-CM

## 2022-12-12 LAB
2HR DELTA HS TROPONIN: -12 NG/L
4HR DELTA HS TROPONIN: -14 NG/L
ABO GROUP BLD: NORMAL
ALBUMIN SERPL BCP-MCNC: 2.8 G/DL (ref 3.5–5)
ALP SERPL-CCNC: 114 U/L (ref 46–116)
ALT SERPL W P-5'-P-CCNC: 21 U/L (ref 12–78)
ANION GAP SERPL CALCULATED.3IONS-SCNC: 9 MMOL/L (ref 4–13)
APTT PPP: 34 SECONDS (ref 23–37)
AST SERPL W P-5'-P-CCNC: 19 U/L (ref 5–45)
ATRIAL RATE: 98 BPM
BACTERIA UR QL AUTO: ABNORMAL /HPF
BASOPHILS # BLD AUTO: 0.06 THOUSANDS/ÂΜL (ref 0–0.1)
BASOPHILS NFR BLD AUTO: 0 % (ref 0–1)
BILIRUB SERPL-MCNC: 0.56 MG/DL (ref 0.2–1)
BILIRUB UR QL STRIP: NEGATIVE
BLD GP AB SCN SERPL QL: NEGATIVE
BUN SERPL-MCNC: 51 MG/DL (ref 5–25)
CALCIUM ALBUM COR SERPL-MCNC: 10.4 MG/DL (ref 8.3–10.1)
CALCIUM SERPL-MCNC: 9.4 MG/DL (ref 8.3–10.1)
CARDIAC TROPONIN I PNL SERPL HS: 43 NG/L
CARDIAC TROPONIN I PNL SERPL HS: 45 NG/L
CARDIAC TROPONIN I PNL SERPL HS: 57 NG/L
CHLORIDE SERPL-SCNC: 102 MMOL/L (ref 96–108)
CLARITY UR: ABNORMAL
CO2 SERPL-SCNC: 20 MMOL/L (ref 21–32)
COLOR UR: ABNORMAL
CREAT SERPL-MCNC: 3.57 MG/DL (ref 0.6–1.3)
EOSINOPHIL # BLD AUTO: 0.08 THOUSAND/ÂΜL (ref 0–0.61)
EOSINOPHIL NFR BLD AUTO: 1 % (ref 0–6)
ERYTHROCYTE [DISTWIDTH] IN BLOOD BY AUTOMATED COUNT: 17.2 % (ref 11.6–15.1)
GFR SERPL CREATININE-BSD FRML MDRD: 15 ML/MIN/1.73SQ M
GLUCOSE SERPL-MCNC: 265 MG/DL (ref 65–140)
GLUCOSE UR STRIP-MCNC: NEGATIVE MG/DL
HCT VFR BLD AUTO: 26.7 % (ref 36.5–49.3)
HGB BLD-MCNC: 8.5 G/DL (ref 12–17)
HGB UR QL STRIP.AUTO: ABNORMAL
IMM GRANULOCYTES # BLD AUTO: 0.16 THOUSAND/UL (ref 0–0.2)
IMM GRANULOCYTES NFR BLD AUTO: 1 % (ref 0–2)
INR PPP: 1.01 (ref 0.84–1.19)
KETONES UR STRIP-MCNC: NEGATIVE MG/DL
LEUKOCYTE ESTERASE UR QL STRIP: ABNORMAL
LYMPHOCYTES # BLD AUTO: 1.19 THOUSANDS/ÂΜL (ref 0.6–4.47)
LYMPHOCYTES NFR BLD AUTO: 7 % (ref 14–44)
MCH RBC QN AUTO: 31 PG (ref 26.8–34.3)
MCHC RBC AUTO-ENTMCNC: 31.8 G/DL (ref 31.4–37.4)
MCV RBC AUTO: 97 FL (ref 82–98)
MONOCYTES # BLD AUTO: 1.19 THOUSAND/ÂΜL (ref 0.17–1.22)
MONOCYTES NFR BLD AUTO: 7 % (ref 4–12)
NEUTROPHILS # BLD AUTO: 13.93 THOUSANDS/ÂΜL (ref 1.85–7.62)
NEUTS SEG NFR BLD AUTO: 84 % (ref 43–75)
NITRITE UR QL STRIP: NEGATIVE
NON-SQ EPI CELLS URNS QL MICRO: ABNORMAL /HPF
NRBC BLD AUTO-RTO: 0 /100 WBCS
P AXIS: 37 DEGREES
PH UR STRIP.AUTO: 5.5 [PH]
PLATELET # BLD AUTO: 247 THOUSANDS/UL (ref 149–390)
PMV BLD AUTO: 10.1 FL (ref 8.9–12.7)
POTASSIUM SERPL-SCNC: 3.7 MMOL/L (ref 3.5–5.3)
PR INTERVAL: 178 MS
PROT SERPL-MCNC: 7.5 G/DL (ref 6.4–8.4)
PROT UR STRIP-MCNC: ABNORMAL MG/DL
PROTHROMBIN TIME: 13.5 SECONDS (ref 11.6–14.5)
QRS AXIS: 25 DEGREES
QRSD INTERVAL: 108 MS
QT INTERVAL: 360 MS
QTC INTERVAL: 459 MS
RBC # BLD AUTO: 2.74 MILLION/UL (ref 3.88–5.62)
RBC #/AREA URNS AUTO: ABNORMAL /HPF
RH BLD: POSITIVE
SODIUM SERPL-SCNC: 131 MMOL/L (ref 135–147)
SP GR UR STRIP.AUTO: 1.01 (ref 1–1.03)
SPECIMEN EXPIRATION DATE: NORMAL
T WAVE AXIS: 59 DEGREES
UROBILINOGEN UR STRIP-ACNC: <2 MG/DL
VENTRICULAR RATE: 98 BPM
WBC # BLD AUTO: 16.61 THOUSAND/UL (ref 4.31–10.16)
WBC #/AREA URNS AUTO: ABNORMAL /HPF
WBC CLUMPS # UR AUTO: PRESENT /UL

## 2022-12-12 RX ORDER — OXYCODONE HYDROCHLORIDE 10 MG/1
10 TABLET ORAL EVERY 6 HOURS PRN
Status: DISCONTINUED | OUTPATIENT
Start: 2022-12-12 | End: 2022-12-19

## 2022-12-12 RX ORDER — HYDROMORPHONE HCL/PF 1 MG/ML
0.5 SYRINGE (ML) INJECTION EVERY 4 HOURS PRN
Status: DISCONTINUED | OUTPATIENT
Start: 2022-12-12 | End: 2022-12-19

## 2022-12-12 RX ORDER — OXYCODONE HYDROCHLORIDE 5 MG/1
5 TABLET ORAL EVERY 6 HOURS PRN
Status: DISCONTINUED | OUTPATIENT
Start: 2022-12-12 | End: 2022-12-19

## 2022-12-12 RX ORDER — LIDOCAINE HYDROCHLORIDE 20 MG/ML
1 JELLY TOPICAL ONCE
Status: COMPLETED | OUTPATIENT
Start: 2022-12-12 | End: 2022-12-12

## 2022-12-12 RX ORDER — HYDROMORPHONE HCL/PF 1 MG/ML
1 SYRINGE (ML) INJECTION ONCE
Status: COMPLETED | OUTPATIENT
Start: 2022-12-12 | End: 2022-12-12

## 2022-12-12 RX ADMIN — CEFEPIME 2000 MG: 2 INJECTION, POWDER, FOR SOLUTION INTRAVENOUS at 18:06

## 2022-12-12 RX ADMIN — HYDROMORPHONE HYDROCHLORIDE 0.5 MG: 1 INJECTION, SOLUTION INTRAMUSCULAR; INTRAVENOUS; SUBCUTANEOUS at 23:56

## 2022-12-12 RX ADMIN — LIDOCAINE HYDROCHLORIDE 1 APPLICATION: 20 JELLY TOPICAL at 18:45

## 2022-12-12 RX ADMIN — HYDROMORPHONE HYDROCHLORIDE 1 MG: 1 INJECTION, SOLUTION INTRAMUSCULAR; INTRAVENOUS; SUBCUTANEOUS at 19:49

## 2022-12-12 NOTE — CONSULTS
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, recurrence with CIS in 2016 BCG again, BCG refractory disease with pelvic metastases now s/p chemoradiation 2021, on current immunotherapy  Acute on chronic gross hematuria  Bilateral distal ureteral obstruction s/t malignancy, managed with bilateral nephrostomy tubes; currently draining clear (right rust/orange, left yellow)  Recurrent UTI- on suppressive bactrim at home    Nephrostomy tubes are draining >1 liter per day at home, currently no significant hematuria  I placed a 24Fr three way urethral wagner catheter for irrigant management of sensation of urinary retention and clot passage  Irrigated for very few clots/debris and about 250cc of clear red urine  He feels better  Keep wagner catheter to gravity drainage, hand irrigation q4 hours  NPO at midnight for reassessment Tuesday AM though it seems the majority of his hematuria is actually from upper tract source based on CT images seen in right renal pelvis, distal ureter, and minimal debris in bladder itself  Subjective: darker blood in urine per urethra past few days  Yesterday and today passing clots  Today feels like he cannot urinate per urethra  Also darker bloody output from bilateral nephrostomy tubes right side red/orange and left side yellow/orange  No fevers or chills  Is on chronic bactrim for uti suppression    Review of Systems   Constitutional: Negative for activity change, appetite change, chills, fever and unexpected weight change  HENT: Negative  Respiratory: Negative  Negative for shortness of breath  Cardiovascular: Negative  Negative for chest pain  Gastrointestinal: Positive for abdominal pain  Negative for diarrhea, nausea and vomiting  Endocrine: Negative      Genitourinary: Positive for difficulty urinating and hematuria  Negative for decreased urine volume, dysuria, flank pain, frequency, penile pain and urgency  Musculoskeletal: Negative for back pain and gait problem  Skin: Negative  Allergic/Immunologic: Negative  Neurological: Negative  Hematological: Negative for adenopathy  Does not bruise/bleed easily  Objective:  Vitals: Blood pressure 120/74, pulse 90, temperature 97 6 °F (36 4 °C), temperature source Oral, resp  rate 18, SpO2 96 %  ,There is no height or weight on file to calculate BMI  Physical Exam  Vitals and nursing note reviewed  Constitutional:       Appearance: He is well-developed  HENT:      Head: Normocephalic and atraumatic  Cardiovascular:      Rate and Rhythm: Normal rate and regular rhythm  Heart sounds: Normal heart sounds  No murmur heard  Pulmonary:      Effort: Pulmonary effort is normal       Breath sounds: Normal breath sounds  Abdominal:      General: Bowel sounds are normal       Palpations: Abdomen is soft  Tenderness: There is no abdominal tenderness (mild suprapubic pain)  There is no right CVA tenderness or left CVA tenderness  Genitourinary:     Comments: Uncircumcised penis normal phallus  Patent meatus  Scrotum testes normal   Musculoskeletal:         General: Normal range of motion  Skin:     General: Skin is warm and dry  Capillary Refill: Capillary refill takes less than 2 seconds  Coloration: Skin is not pale  Neurological:      Mental Status: He is alert and oriented to person, place, and time  Imaging:    CT abdomen pelvis wo contrast [[id number]] Collected: 12/12/22 1724   Order Status: Completed Updated: 12/12/22 1807   Narrative:     CT ABDOMEN AND PELVIS WITHOUT IV CONTRAST     INDICATION:   evaluate bilateral neprhrostomy tube placement; pt feels it is dislodged and having hematuria  COMPARISON:  CT scan from 11/17/2022       TECHNIQUE:  CT examination of the abdomen and pelvis was performed without intravenous contrast  Axial, sagittal, and coronal 2D reformatted images were created from the source data and submitted for interpretation  Radiation dose length product (DLP) for this visit:  1097 44 mGy-cm    This examination, like all CT scans performed in the [facility], was performed utilizing techniques to minimize radiation dose exposure, including the use of   iterative reconstruction and automated exposure control  Enteric contrast was not administered  FINDINGS:     ABDOMEN     LOWER CHEST:  Stable left pleural effusion with drainage catheter in place   Stable adjacent atelectasis   Stable epicardial nodules likely lymph nodes the largest unchanged measuring 3 6 cm  LIVER/BILIARY TREE:  Unremarkable  GALLBLADDER:  Removed  SPLEEN:  Unremarkable  PANCREAS:  Unremarkable  ADRENAL GLANDS:  Unremarkable  KIDNEYS/URETERS:  Interval development of right-sided hydroureteronephrosis despite the low percutaneous nephrostomy tube   Hypodensity in the distal right ureter likely blood   Stable appearance of the left kidney with left-sided percutaneous   nephrostomy tube   Stable left renal cyst      STOMACH AND BOWEL:  Status post gastric bypass surgery   No bowel obstruction  APPENDIX:  No findings to suggest appendicitis  ABDOMINOPELVIC CAVITY:  No ascites   No pneumoperitoneum   Stable retroperitoneal adenopathy the largest measuring 2 2 cm on image 2/42  VESSELS:  Unremarkable for patient's age  PELVIS     REPRODUCTIVE ORGANS:  Unremarkable for patient's age  URINARY BLADDER:  Stable appearance of the bladder with asymmetric thickening of the left bladder wall in keeping with history of bladder cancer  ABDOMINAL WALL/INGUINAL REGIONS:  Unremarkable  OSSEOUS STRUCTURES:  No acute fracture or destructive osseous lesion      Impression:       Stable bladder wall thickening towards the left compatible with known neoplasm  New right hydronephrosis with high density intraluminal material in the distal right ureter likely representing blood  Stable metastatic retroperitoneal lymphadenopathy  Stable left pleural effusion with epicardial metastasis  Imaging reviewed - both report and images personally reviewed  Labs:  Recent Labs     22  1358   WBC 16 61*     Recent Labs     22  1358   HGB 8 5*       Recent Labs     22  1358   CREATININE 3 57*       Microbiology:  Sent/pending  Usually produces e coli    History:  Social History     Socioeconomic History   • Marital status: /Civil Union     Spouse name: None   • Number of children: None   • Years of education: None   • Highest education level: None   Occupational History   • None   Tobacco Use   • Smoking status: Former     Packs/day: 3 00     Years: 27 00     Pack years: 81 00     Types: Cigarettes     Quit date: 1970     Years since quittin 9   • Smokeless tobacco: Never   Vaping Use   • Vaping Use: Never used   Substance and Sexual Activity   • Alcohol use: Never     Comment: beer / liquor   • Drug use: Not Currently     Types: Marijuana     Comment: quit 2019 had medical marijuana   • Sexual activity: Not Currently   Other Topics Concern   • None   Social History Narrative    Consumes 1 cup of coffee and 1 soda per day     Social Determinants of Health     Financial Resource Strain: Not on file   Food Insecurity: No Food Insecurity   • Worried About Running Out of Food in the Last Year: Never true   • Ran Out of Food in the Last Year: Never true   Transportation Needs: No Transportation Needs   • Lack of Transportation (Medical): No   • Lack of Transportation (Non-Medical):  No   Physical Activity: Not on file   Stress: Not on file   Social Connections: Not on file   Intimate Partner Violence: Not on file   Housing Stability: Unknown   • Unable to Pay for Housing in the Last Year: No   • Number of Places Lived in the Last Year: 1   • Unstable Housing in the Last Year: Not on file     Financial Resource Strain: Not on file   Food Insecurity: No Food Insecurity   • Worried About [address] in the Last Year: Never true   • Ran Out of Food in the Last Year: Never true   Transportation Needs: No Transportation Needs   • Lack of Transportation (Medical): No   • Lack of Transportation (Non-Medical): No   Physical Activity: Not on file   Stress: Not on file   Social Connections: Not on file   Intimate Partner Violence: Not on file   Housing Stability: Unknown   • Unable to Pay for Housing in the Last Year: No   • Number of Places Lived in the Last Year: 1   • Unstable Housing in the Last Year: Not on file      Diagnosis Date   • Anemia     Last assessed: 9/28/17   • Anxiety    • Arteriosclerotic cardiovascular disease     Last assessed: 9/28/17   • Arthritis    • Bladder cancer ([facility] )     bladder- had BCG treatments   • Chronic kidney disease     Stage IV   • CKD (chronic kidney disease) stage 4, GFR 15-29 ml/min ([facility])    • Colon polyp    • Coronary artery disease     7 stents   • Depression    • Diabetes mellitus ([facility])     IDDM   • GERD (gastroesophageal reflux disease)    • Glaucoma    • Hematuria    • History of fusion of cervical spine    • Hyperlipidemia    • Hypertension    • Insomnia     Last assessed: 11/14/12   • Loss of hearing     has hearing aids but usually does not wear them   • Lung cancer ([facility] )    • Metastatic cancer ([facility] )    • Other seasonal allergic rhinitis     Last assessed: 2/10/16   • PAD (peripheral artery disease) ([facility])    • Shortness of breath     on exertion   • Spinal stenosis of lumbar region    • Transient cerebral ischemia     No Residual   • Uses walker     w/c for longer distances     Past Surgical History:   Procedure Laterality Date   • CARDIAC SURGERY      Cath stent placement  Last assessed: 3/9/17   Interventional Catheterization   • CHOLECYSTECTOMY     • COLONOSCOPY     • CYSTOSCOPY Diagnostic w/biopsy  Estephanie Thompson  Last assessed: 12/1/14   • CYSTOSCOPY N/A 04/12/2022    Procedure: CYSTOSCOPY  Bladder biopsies  ;  Surgeon: Jayden Turner MD;  [facility];  Service: Urology   • CYSTOSCOPY W/ RETROGRADES Right 03/01/2022    Procedure: CYSTO; stent removal retrograde;  Surgeon: Jayden Turner MD;  [facility];  Service: Urology   • CYSTOSCOPY W/ URETERAL STENT PLACEMENT Bilateral 10/18/2021    Procedure: bilateral retrogrades, cytology collection;  Surgeon: Jayden Turner MD;  [facility];  Service: Urology   • CYSTOURETHROSCOPY      w/cautery  Estephanie Thompson   • FL RETROGRADE PYELOGRAM  10/18/2021   • FL RETROGRADE PYELOGRAM  10/24/2021   • GASTRIC BYPASS      For morbid obesity w/Shaji-en-Y   Resolved: 11/17/09   • INCISION AND DRAINAGE OF WOUND Right 02/26/2017    Procedure: INCISION AND DRAINAGE (I&D) EXTREMITY WITH APPLICATION OF GRAFT JACKET;  Surgeon: Bonita Baron DPM;  [facility];  Service:    • INCISION AND DRAINAGE OF WOUND Right 04/25/2017    Procedure: INCISION AND DRAINAGE (I&D) EXTREMITY, APPLICATION OF GRAFT;  Surgeon: Bonita Baron DPM;  [facility];  Service:    • IR BIOPSY OTHER  07/02/2020   • IR LOWER EXTREMITY ANGIOGRAM  02/08/2021   • IR LOWER EXTREMITY ANGIOGRAM  02/11/2021   • IR NEPHROSTOMY TUBE CHECK/CHANGE/REPOSITION/REINSERTION/UPSIZE  04/28/2022   • IR NEPHROSTOMY TUBE CHECK/CHANGE/REPOSITION/REINSERTION/UPSIZE  05/24/2022   • IR NEPHROSTOMY TUBE CHECK/CHANGE/REPOSITION/REINSERTION/UPSIZE  06/07/2022   • IR NEPHROSTOMY TUBE CHECK/CHANGE/REPOSITION/REINSERTION/UPSIZE  07/28/2022   • IR NEPHROSTOMY TUBE CHECK/CHANGE/REPOSITION/REINSERTION/UPSIZE  11/15/2022   • IR NEPHROSTOMY TUBE PLACEMENT  02/25/2022   • IR PORT PLACEMENT  01/17/2022   • IR THORACENTESIS  09/02/2022   • IR THORACENTESIS  09/14/2022   • IR THORACENTESIS WITH TUBE PLACEMENT Left     october   • IR TUNNELED CENTRAL LINE PLACEMENT  12/24/2020 • JOINT REPLACEMENT      christofer knees replaced   • NM AMPUTATION METATARSAL W/TOE SINGLE Left 12/21/2020    Procedure: RAY RESECTION FOOT;  Surgeon: Joshua Weber DPM;  [facility];  Service: Podiatry   • NM AMPUTATION METATARSAL W/TOE SINGLE Left 12/31/2020    Procedure: 5TH MET RESECTION;  Surgeon: Joshua Weber DPM;  [facility];  Service: Podiatry   • NM CYSTO W/IRRIG & EVAC MULTPLE OBSTRUCTING CLOTS N/A 02/10/2021    Procedure: CYSTOSCOPY EVACUATION OF CLOTS, fulguration;  Surgeon: Bear Melchor MD;  [facility];  Service: Urology   • NM CYSTO W/IRRIG & EVAC MULTPLE OBSTRUCTING CLOTS N/A 10/24/2021    Procedure: CYSTOSCOPY EVACUATION OF CLOT, fulguration of bleeding vessels, right ureter stent placement, retrograde pyelogram;  Surgeon: Kym Choi MD;  [facility];  Service: Urology   • NM CYSTO W/REMOVAL OF LESIONS SMALL N/A 11/19/2020    Procedure: CYSTO W/BIOPSIES, transurethral prostate bx;  Surgeon: Farhana Wall MD;  [facility];  Service: Urology   • NM CYSTOURETHROSCOPY W/DEST &/RMVL TUMOR LARGE Bilateral 10/18/2021    Procedure: TRANSURETHRAL RESECTION OF BLADDER TUMOR (TURBT);   Surgeon: Mady Lr MD;  [facility];  Service: Urology   • NM CYSTOURETHROSCOPY WITH BIOPSY N/A 08/16/2016    Procedure: Jacquiline Miu;  Surgeon: Alvin Vann MD;  [facility];  Service: Urology   • NM Margaretmary Button 3RD+ ORD [address] PEL/LXTR [facility] Left 02/08/2021    Procedure: LEG angiogram, CO2 w/limited contrast with balloon angioplasty postertior tibial artery;  Surgeon: Clint Evans MD;  [facility];  Service: Vascular   • ROTATOR CUFF REPAIR     • SMALL INTESTINE SURGERY      Surgery Shaji-en-Y   • SPINAL FUSION      lumbar and cervical fusions   • VAC DRESSING APPLICATION Right 96/09/8597    Procedure: APPLICATION VAC DRESSING;  Surgeon: Ken Gillis DPM;  [facility];  Service:    • WOUND DEBRIDEMENT Left 02/16/2021    Procedure: FOOT DEBRIDE, [address] OUT w/graft application;  Surgeon: Salo Macedo DPM;  [facility];  Service: Podiatry     Family History   Problem Relation Age of Onset   • Diabetes Mother    • Heart disease Mother    • Other Mother         High blood pressure   • Heart disease Father    • Diabetes Sister    • Other Sister         High blood pressure   • Kidney disease Sister    • Heart disease Brother    • Other Brother         High blood pressure       Regina Heller  Date: 12/12/2022 Time: 7:27 PM

## 2022-12-12 NOTE — ED ATTENDING ATTESTATION
12/12/2022  I, Pat Jennings MD, saw and evaluated the patient  I have discussed the patient with the resident/non-physician practitioner and agree with the resident's/non-physician practitioner's findings, Plan of Care, and MDM as documented in the resident's/non-physician practitioner's note, except where noted  All available labs and Radiology studies were reviewed  I was present for key portions of any procedure(s) performed by the resident/non-physician practitioner and I was immediately available to provide assistance  At this point I agree with the current assessment done in the Emergency Department  I have conducted an independent evaluation of this patient a history and physical is as follows:    ED Course         Critical Care Time  Procedures    75 yo male with bladder ca, bilateral nephrostomy tubes, here for hematuria and feels tubes may be dislodged  Pt with increased weakness  No fever, no abdominal pain  Vss, afebrile, lungs cta, rrr, abdomen soft nontender, bilateral nephrostomy tubes with leakage  Labs, urine, ct a/p, abx, urology consult

## 2022-12-13 ENCOUNTER — ANESTHESIA EVENT (INPATIENT)
Dept: PERIOP | Facility: HOSPITAL | Age: 77
End: 2022-12-13

## 2022-12-13 LAB
ANION GAP SERPL CALCULATED.3IONS-SCNC: 8 MMOL/L (ref 4–13)
BUN SERPL-MCNC: 50 MG/DL (ref 5–25)
CALCIUM SERPL-MCNC: 9.1 MG/DL (ref 8.3–10.1)
CHLORIDE SERPL-SCNC: 105 MMOL/L (ref 96–108)
CO2 SERPL-SCNC: 22 MMOL/L (ref 21–32)
CREAT SERPL-MCNC: 3.46 MG/DL (ref 0.6–1.3)
ERYTHROCYTE [DISTWIDTH] IN BLOOD BY AUTOMATED COUNT: 17.1 % (ref 11.6–15.1)
GFR SERPL CREATININE-BSD FRML MDRD: 15 ML/MIN/1.73SQ M
GLUCOSE SERPL-MCNC: 136 MG/DL (ref 65–140)
GLUCOSE SERPL-MCNC: 168 MG/DL (ref 65–140)
GLUCOSE SERPL-MCNC: 181 MG/DL (ref 65–140)
GLUCOSE SERPL-MCNC: 282 MG/DL (ref 65–140)
GLUCOSE SERPL-MCNC: 318 MG/DL (ref 65–140)
HCT VFR BLD AUTO: 24.4 % (ref 36.5–49.3)
HGB BLD-MCNC: 7.9 G/DL (ref 12–17)
MCH RBC QN AUTO: 31.2 PG (ref 26.8–34.3)
MCHC RBC AUTO-ENTMCNC: 32.4 G/DL (ref 31.4–37.4)
MCV RBC AUTO: 96 FL (ref 82–98)
PLATELET # BLD AUTO: 235 THOUSANDS/UL (ref 149–390)
PMV BLD AUTO: 9.8 FL (ref 8.9–12.7)
POTASSIUM SERPL-SCNC: 3.8 MMOL/L (ref 3.5–5.3)
RBC # BLD AUTO: 2.53 MILLION/UL (ref 3.88–5.62)
SODIUM SERPL-SCNC: 135 MMOL/L (ref 135–147)
WBC # BLD AUTO: 12.95 THOUSAND/UL (ref 4.31–10.16)

## 2022-12-13 RX ORDER — SENNOSIDES 8.6 MG
2 TABLET ORAL 2 TIMES DAILY
Status: DISCONTINUED | OUTPATIENT
Start: 2022-12-13 | End: 2022-12-22 | Stop reason: HOSPADM

## 2022-12-13 RX ORDER — ACETAMINOPHEN 325 MG/1
650 TABLET ORAL EVERY 6 HOURS PRN
Status: DISCONTINUED | OUTPATIENT
Start: 2022-12-13 | End: 2022-12-22 | Stop reason: HOSPADM

## 2022-12-13 RX ORDER — EZETIMIBE 10 MG/1
10 TABLET ORAL DAILY
Status: DISCONTINUED | OUTPATIENT
Start: 2022-12-13 | End: 2022-12-22 | Stop reason: HOSPADM

## 2022-12-13 RX ORDER — CALCITRIOL 0.25 UG/1
0.25 CAPSULE, LIQUID FILLED ORAL DAILY
Status: DISCONTINUED | OUTPATIENT
Start: 2022-12-13 | End: 2022-12-22 | Stop reason: HOSPADM

## 2022-12-13 RX ORDER — GABAPENTIN 300 MG/1
300 CAPSULE ORAL
Status: DISCONTINUED | OUTPATIENT
Start: 2022-12-13 | End: 2022-12-22 | Stop reason: HOSPADM

## 2022-12-13 RX ORDER — METOPROLOL SUCCINATE 100 MG/1
100 TABLET, EXTENDED RELEASE ORAL DAILY
Status: DISCONTINUED | OUTPATIENT
Start: 2022-12-13 | End: 2022-12-22 | Stop reason: HOSPADM

## 2022-12-13 RX ORDER — INSULIN LISPRO 100 [IU]/ML
1-5 INJECTION, SOLUTION INTRAVENOUS; SUBCUTANEOUS
Status: DISCONTINUED | OUTPATIENT
Start: 2022-12-13 | End: 2022-12-22 | Stop reason: HOSPADM

## 2022-12-13 RX ORDER — INSULIN GLARGINE 100 [IU]/ML
18 INJECTION, SOLUTION SUBCUTANEOUS
Status: DISCONTINUED | OUTPATIENT
Start: 2022-12-13 | End: 2022-12-22 | Stop reason: HOSPADM

## 2022-12-13 RX ORDER — INSULIN LISPRO 100 [IU]/ML
5 INJECTION, SOLUTION INTRAVENOUS; SUBCUTANEOUS
Status: DISCONTINUED | OUTPATIENT
Start: 2022-12-13 | End: 2022-12-22 | Stop reason: HOSPADM

## 2022-12-13 RX ORDER — PREDNISONE 20 MG/1
20 TABLET ORAL DAILY
Status: DISCONTINUED | OUTPATIENT
Start: 2022-12-13 | End: 2022-12-14

## 2022-12-13 RX ORDER — OXYBUTYNIN CHLORIDE 10 MG/1
10 TABLET, EXTENDED RELEASE ORAL DAILY
Status: DISCONTINUED | OUTPATIENT
Start: 2022-12-13 | End: 2022-12-22 | Stop reason: HOSPADM

## 2022-12-13 RX ORDER — AZELASTINE 1 MG/ML
1 SPRAY, METERED NASAL 2 TIMES DAILY
Status: DISCONTINUED | OUTPATIENT
Start: 2022-12-13 | End: 2022-12-22 | Stop reason: HOSPADM

## 2022-12-13 RX ORDER — FLUTICASONE PROPIONATE 50 MCG
1 SPRAY, SUSPENSION (ML) NASAL DAILY
Status: DISCONTINUED | OUTPATIENT
Start: 2022-12-13 | End: 2022-12-22 | Stop reason: HOSPADM

## 2022-12-13 RX ORDER — FERROUS SULFATE 325(65) MG
325 TABLET ORAL
Status: DISCONTINUED | OUTPATIENT
Start: 2022-12-13 | End: 2022-12-22 | Stop reason: HOSPADM

## 2022-12-13 RX ORDER — DULOXETIN HYDROCHLORIDE 30 MG/1
30 CAPSULE, DELAYED RELEASE ORAL DAILY
Status: DISCONTINUED | OUTPATIENT
Start: 2022-12-13 | End: 2022-12-22 | Stop reason: HOSPADM

## 2022-12-13 RX ORDER — INSULIN LISPRO 100 [IU]/ML
1-6 INJECTION, SOLUTION INTRAVENOUS; SUBCUTANEOUS
Status: DISCONTINUED | OUTPATIENT
Start: 2022-12-13 | End: 2022-12-22 | Stop reason: HOSPADM

## 2022-12-13 RX ORDER — ARIPIPRAZOLE 2 MG/1
2 TABLET ORAL DAILY
Status: DISCONTINUED | OUTPATIENT
Start: 2022-12-13 | End: 2022-12-22 | Stop reason: HOSPADM

## 2022-12-13 RX ORDER — ISOSORBIDE MONONITRATE 30 MG/1
30 TABLET, EXTENDED RELEASE ORAL EVERY MORNING
Status: DISCONTINUED | OUTPATIENT
Start: 2022-12-13 | End: 2022-12-22 | Stop reason: HOSPADM

## 2022-12-13 RX ORDER — TAMSULOSIN HYDROCHLORIDE 0.4 MG/1
0.4 CAPSULE ORAL
Status: DISCONTINUED | OUTPATIENT
Start: 2022-12-13 | End: 2022-12-22 | Stop reason: HOSPADM

## 2022-12-13 RX ORDER — ONDANSETRON 2 MG/ML
4 INJECTION INTRAMUSCULAR; INTRAVENOUS EVERY 6 HOURS PRN
Status: DISCONTINUED | OUTPATIENT
Start: 2022-12-13 | End: 2022-12-22 | Stop reason: HOSPADM

## 2022-12-13 RX ORDER — PANTOPRAZOLE SODIUM 40 MG/1
40 TABLET, DELAYED RELEASE ORAL
Status: DISCONTINUED | OUTPATIENT
Start: 2022-12-13 | End: 2022-12-22 | Stop reason: HOSPADM

## 2022-12-13 RX ORDER — CIPROFLOXACIN 2 MG/ML
400 INJECTION, SOLUTION INTRAVENOUS ONCE
Status: CANCELLED | OUTPATIENT
Start: 2022-12-14

## 2022-12-13 RX ORDER — ZOLPIDEM TARTRATE 5 MG/1
5 TABLET ORAL
Status: DISCONTINUED | OUTPATIENT
Start: 2022-12-13 | End: 2022-12-22 | Stop reason: HOSPADM

## 2022-12-13 RX ORDER — POLYETHYLENE GLYCOL 3350 17 G/17G
17 POWDER, FOR SOLUTION ORAL DAILY
Status: DISCONTINUED | OUTPATIENT
Start: 2022-12-13 | End: 2022-12-22 | Stop reason: HOSPADM

## 2022-12-13 RX ADMIN — METOPROLOL SUCCINATE 100 MG: 100 TABLET, EXTENDED RELEASE ORAL at 09:17

## 2022-12-13 RX ADMIN — OXYBUTYNIN 10 MG: 10 TABLET, FILM COATED, EXTENDED RELEASE ORAL at 09:16

## 2022-12-13 RX ADMIN — SENNOSIDES 17.2 MG: 8.6 TABLET, FILM COATED ORAL at 17:25

## 2022-12-13 RX ADMIN — PANTOPRAZOLE SODIUM 40 MG: 40 TABLET, DELAYED RELEASE ORAL at 05:26

## 2022-12-13 RX ADMIN — ARIPIPRAZOLE 2 MG: 2 TABLET ORAL at 09:17

## 2022-12-13 RX ADMIN — CALCITRIOL CAPSULES 0.25 MCG 0.25 MCG: 0.25 CAPSULE ORAL at 09:17

## 2022-12-13 RX ADMIN — ISOSORBIDE MONONITRATE 30 MG: 30 TABLET, EXTENDED RELEASE ORAL at 09:16

## 2022-12-13 RX ADMIN — HYDROMORPHONE HYDROCHLORIDE 0.5 MG: 1 INJECTION, SOLUTION INTRAMUSCULAR; INTRAVENOUS; SUBCUTANEOUS at 04:35

## 2022-12-13 RX ADMIN — INSULIN LISPRO 5 UNITS: 100 INJECTION, SOLUTION INTRAVENOUS; SUBCUTANEOUS at 06:16

## 2022-12-13 RX ADMIN — SODIUM CHLORIDE 500 ML: 0.9 INJECTION, SOLUTION INTRAVENOUS at 19:47

## 2022-12-13 RX ADMIN — FLUTICASONE PROPIONATE 1 SPRAY: 50 SPRAY, METERED NASAL at 12:18

## 2022-12-13 RX ADMIN — INSULIN LISPRO 1 UNITS: 100 INJECTION, SOLUTION INTRAVENOUS; SUBCUTANEOUS at 22:22

## 2022-12-13 RX ADMIN — TAMSULOSIN HYDROCHLORIDE 0.4 MG: 0.4 CAPSULE ORAL at 17:25

## 2022-12-13 RX ADMIN — OXYCODONE HYDROCHLORIDE 10 MG: 10 TABLET ORAL at 01:35

## 2022-12-13 RX ADMIN — INSULIN LISPRO 1 UNITS: 100 INJECTION, SOLUTION INTRAVENOUS; SUBCUTANEOUS at 12:29

## 2022-12-13 RX ADMIN — GABAPENTIN 300 MG: 300 CAPSULE ORAL at 22:22

## 2022-12-13 RX ADMIN — INSULIN GLARGINE 18 UNITS: 100 INJECTION, SOLUTION SUBCUTANEOUS at 22:22

## 2022-12-13 RX ADMIN — AZELASTINE HYDROCHLORIDE 1 SPRAY: 137 SPRAY, METERED NASAL at 12:18

## 2022-12-13 RX ADMIN — EZETIMIBE 10 MG: 10 TABLET ORAL at 09:16

## 2022-12-13 RX ADMIN — CEFEPIME 1000 MG: 1 INJECTION, POWDER, FOR SOLUTION INTRAMUSCULAR; INTRAVENOUS at 17:30

## 2022-12-13 RX ADMIN — POLYETHYLENE GLYCOL 3350 17 G: 17 POWDER, FOR SOLUTION ORAL at 09:18

## 2022-12-13 RX ADMIN — HYDROMORPHONE HYDROCHLORIDE 0.5 MG: 1 INJECTION, SOLUTION INTRAMUSCULAR; INTRAVENOUS; SUBCUTANEOUS at 12:29

## 2022-12-13 RX ADMIN — DULOXETINE 30 MG: 30 CAPSULE, DELAYED RELEASE ORAL at 09:17

## 2022-12-13 RX ADMIN — FERROUS SULFATE TAB 325 MG (65 MG ELEMENTAL FE) 325 MG: 325 (65 FE) TAB at 09:17

## 2022-12-13 RX ADMIN — OXYCODONE HYDROCHLORIDE 10 MG: 10 TABLET ORAL at 09:29

## 2022-12-13 RX ADMIN — ACETAMINOPHEN 650 MG: 325 TABLET ORAL at 22:29

## 2022-12-13 RX ADMIN — SODIUM CHLORIDE 500 ML: 0.9 INJECTION, SOLUTION INTRAVENOUS at 04:56

## 2022-12-13 RX ADMIN — OXYCODONE HYDROCHLORIDE 5 MG: 5 TABLET ORAL at 20:06

## 2022-12-13 RX ADMIN — INSULIN LISPRO 5 UNITS: 100 INJECTION, SOLUTION INTRAVENOUS; SUBCUTANEOUS at 09:18

## 2022-12-13 RX ADMIN — INSULIN LISPRO 5 UNITS: 100 INJECTION, SOLUTION INTRAVENOUS; SUBCUTANEOUS at 18:47

## 2022-12-13 NOTE — ASSESSMENT & PLAN NOTE
· Diagnosed in 1994 s/p treatment with BCG, however recurrence, s/p chemoradiation in 2021  · Has bilateral nephrostomy tube  · Patient also noted to be on prednisone 20 mg daily, patient uncertain why    · Urology following

## 2022-12-13 NOTE — ED PROVIDER NOTES
History  Chief Complaint   Patient presents with   • Blood in Urine     Pt with bladder cancer with b/l nephrostomy tubes  Reports blood in R nephrostomy tube for about a week, concerned for progression for cancer per nephrologist  Pt also reports he may have dislodged his tube  • Weakness - Generalized     Pt reports feeling weak and fatigued since the weekend, hx of same requiring transfusion  HPI    Patient is a 68-year-old male with past medical history of bladder cancer currently undergoing immunotherapy, status post bilateral nephrostomy tubes, anemia, CKD, coronary artery disease, DM2, hypertension, hyperlipidemia, lung cancer, presenting to the ED for evaluation of few week history of hematuria with passage of large blood clots  He also feels as if there is some leakage around his bilateral nephrostomy tubes for the last few weeks  Patient's wife has been urging him to come to the ED for evaluation, but patient has not felt that this was necessary  Over the last few days, patient has felt increasingly weak and fatigued, with increased frequency of clots in his urine  The blood clots do not prevent him from urinating, but he does experience some burning sensation with urination  He denies any suprapubic fullness, anterior abdominal pain, nausea, vomiting, low back pain, fevers, chills, cough or congestion, headache, visual changes, constipation or diarrhea  Prior to Admission Medications   Prescriptions Last Dose Informant Patient Reported? Taking?    ARIPiprazole (ABILIFY) 2 mg tablet   No No   Sig: Take 1 tablet (2 mg total) by mouth daily   Accu-Chek Softclix Lancets lancets   No No   Sig: Test blood sugar 4 times daily   Azelastine HCl 0 15 % SOLN   No No   Sig: Inhale 1 spray 2 (two) times a day   Bimatoprost (LUMIGAN OP)   Yes No   Sig: Apply 1 drop to eye daily at bedtime    DULoxetine (CYMBALTA) 30 mg delayed release capsule   No No   Sig: TAKE ONE CAPSULE BY MOUTH EVERY DAY   Ferrous Sulfate Dried (Feosol) 200 (65 Fe) MG TABS   No No   Sig: Take 65 mg by mouth daily   Insulin Pen Needle 31G X 8 MM MISC   No No   Sig: Inject 3 times a day   Lantus SoloStar 100 units/mL injection pen   No No   Si units qhs   Patient taking differently: Inject 18 Units under the skin daily at bedtime   NovoLOG FlexPen 100 units/mL injection pen   No No   Sig: 10 units with each meal    Patient taking differently: Inject 10 Units under the skin 3 (three) times a day with meals   Patiromer Sorbitex Calcium 8 4 g PACK   Yes No   Sig: Take 8 4 packets by mouth   acetaminophen (TYLENOL) 325 mg tablet   Yes No   Sig: Take 650 mg by mouth every 6 (six) hours as needed for mild pain     aspirin (ECOTRIN LOW STRENGTH) 81 mg EC tablet   No No   Sig: Take 1 tablet (81 mg total) by mouth daily   calcitriol (ROCALTROL) 0 25 mcg capsule   No No   Sig: Take 1 capsule (0 25 mcg total) by mouth daily   cetirizine (ZyrTEC) 10 MG chewable tablet   Yes No   Sig: Chew 10 mg daily   cyanocobalamin (VITAMIN B-12) 1000 MCG tablet   No No   Sig: Take 1 tablet (1,000 mcg total) by mouth daily   ezetimibe (ZETIA) 10 mg tablet   No No   Sig: Take 1 tablet (10 mg total) by mouth daily   fluocinonide (LIDEX) 0 05 % cream   No No   Sig: Apply topically as needed for irritation   fluticasone (FLONASE) 50 mcg/act nasal spray   No No   Si spray into each nostril as needed for allergies   furosemide (LASIX) 20 mg tablet   No No   Sig: Take 1 tablet (20 mg total) by mouth daily   gabapentin (NEURONTIN) 300 mg capsule   No No   Sig: TAKE ONE CAPSULE BY MOUTH EVERY DAY AT BEDTIME   glucose blood (Accu-Chek Martha Plus) test strip   No No   Sig: Test blood sugar 4 times daily   hydrocortisone 2 5 % cream   No No   Sig: Apply topically 2 (two) times a day as needed for irritation or rash   isosorbide mononitrate (IMDUR) 30 mg 24 hr tablet   No No   Sig: TAKE ONE TABLET BY MOUTH EVERY DAY IN THE MORNING   lidocaine (XYLOCAINE) 2 % topical gel   No No   Sig: Apply topically as needed for mild pain   lidocaine-prilocaine (EMLA) cream   No No   Sig: Apply topically as needed for mild pain   loperamide (IMODIUM) 2 mg capsule   Yes No   Sig: Take 2 mg by mouth 4 (four) times a day as needed for diarrhea   metoprolol succinate (TOPROL-XL) 100 mg 24 hr tablet   No No   Sig: TAKE ONE TABLET BY MOUTH EVERY DAY   multivitamin (THERAGRAN) TABS   Yes No   Sig: Take 1 tablet by mouth daily  naloxone (NARCAN) 4 mg/0 1 mL nasal spray   No No   Sig: Administer 1 spray into a nostril  If no response after 2-3 minutes, give another dose in the other nostril using a new spray     omeprazole (PriLOSEC) 20 mg delayed release capsule   No No   Sig: Take 1 capsule (20 mg total) by mouth daily in the early morning PRN   ondansetron (Zofran ODT) 8 mg disintegrating tablet   No No   Sig: Take 1 tablet (8 mg total) by mouth every 8 (eight) hours as needed for nausea or vomiting   oxyCODONE (OxyCONTIN) 10 mg 12 hr tablet   No No   Sig: Take 1 tablet (10 mg total) by mouth every 8 (eight) hours Max Daily Amount: 30 mg   oxyCODONE (Roxicodone) 5 immediate release tablet   No No   Sig: Take 1 tablet (5 mg total) by mouth every 4 (four) hours as needed for moderate pain Max Daily Amount: 30 mg   oxybutynin (DITROPAN-XL) 10 MG 24 hr tablet   No No   Sig: TAKE ONE TABLET BY MOUTH EVERY DAY   phenazopyridine (PYRIDIUM) 200 mg tablet   No No   Sig: Take 1 tablet (200 mg total) by mouth 3 (three) times a day with meals   polyethylene glycol (MIRALAX) 17 g packet   No No   Sig: Take 17 g by mouth daily   predniSONE 20 mg tablet   No No   Sig: Take 1 tablet (20 mg total) by mouth daily   senna (SENOKOT) 8 6 MG tablet   No No   Sig: Take 2 tablets (17 2 mg total) by mouth 2 (two) times a day   sodium chloride, PF, 0 9 %   No No   Sig: 10 mL by Intracatheter route daily Intracatheter flushing daily   Patient not taking: Reported on 11/22/2022   sodium chloride, PF, 0 9 %   No No   Sig: 10 mL by Intracatheter route daily Intracatheter flushing daily   Patient not taking: Reported on 11/22/2022   sodium chloride, PF, 0 9 %   No No   Sig: 10 mL by Intracatheter route daily Bilateral PCNs to be flushed daily with prefilled 10cc NS   tamsulosin (FLOMAX) 0 4 mg   No No   Sig: Take 1 capsule (0 4 mg total) by mouth daily with dinner   zolpidem (AMBIEN) 5 mg tablet   No No   Sig: Take 1 tablet (5 mg total) by mouth daily at bedtime as needed for sleep      Facility-Administered Medications: None       Past Medical History:   Diagnosis Date   • Anemia     Last assessed: 9/28/17   • Anxiety    • Arteriosclerotic cardiovascular disease     Last assessed: 9/28/17   • Arthritis    • Bladder cancer ([facility] )     bladder- had BCG treatments   • Chronic kidney disease     Stage IV   • CKD (chronic kidney disease) stage 4, GFR 15-29 ml/min ([facility])    • Colon polyp    • Coronary artery disease     7 stents   • Depression    • Diabetes mellitus ([facility])     IDDM   • GERD (gastroesophageal reflux disease)    • Glaucoma    • Hematuria    • History of fusion of cervical spine    • Hyperlipidemia    • Hypertension    • Insomnia     Last assessed: 11/14/12   • Loss of hearing     has hearing aids but usually does not wear them   • Lung cancer ([facility] )    • Metastatic cancer ([facility] )    • Other seasonal allergic rhinitis     Last assessed: 2/10/16   • PAD (peripheral artery disease) ([facility])    • Shortness of breath     on exertion   • Spinal stenosis of lumbar region    • Transient cerebral ischemia     No Residual   • Uses walker     w/c for longer distances       Past Surgical History:   Procedure Laterality Date   • CARDIAC SURGERY      Cath stent placement  Last assessed: 3/9/17  Interventional Catheterization   • CHOLECYSTECTOMY     • COLONOSCOPY     • CYSTOSCOPY      Diagnostic w/biopsy  Sharron Otero  Last assessed: 12/1/14   • CYSTOSCOPY N/A 04/12/2022    Procedure: CYSTOSCOPY  Bladder biopsies  ;  Surgeon: Sean Castle MD;  [facility];  Service: Urology   • CYSTOSCOPY W/ RETROGRADES Right 03/01/2022    Procedure: CYSTO; stent removal retrograde;  Surgeon: Kian Jc MD;  [facility];  Service: Urology   • CYSTOSCOPY W/ URETERAL STENT PLACEMENT Bilateral 10/18/2021    Procedure: bilateral retrogrades, cytology collection;  Surgeon: Kian Jc MD;  [facility];  Service: Urology   • CYSTOURETHROSCOPY      w/cautery  Alejandro Oliveira   • FL RETROGRADE PYELOGRAM  10/18/2021   • FL RETROGRADE PYELOGRAM  10/24/2021   • GASTRIC BYPASS      For morbid obesity w/Shaji-en-Y   Resolved: 11/17/09   • INCISION AND DRAINAGE OF WOUND Right 02/26/2017    Procedure: INCISION AND DRAINAGE (I&D) EXTREMITY WITH APPLICATION OF GRAFT JACKET;  Surgeon: Maria M Ha DPM;  [facility];  Service:    • INCISION AND DRAINAGE OF WOUND Right 04/25/2017    Procedure: INCISION AND DRAINAGE (I&D) EXTREMITY, APPLICATION OF GRAFT;  Surgeon: Maria M Ha DPM;  [facility];  Service:    • IR BIOPSY OTHER  07/02/2020   • IR LOWER EXTREMITY ANGIOGRAM  02/08/2021   • IR LOWER EXTREMITY ANGIOGRAM  02/11/2021   • IR NEPHROSTOMY TUBE CHECK/CHANGE/REPOSITION/REINSERTION/UPSIZE  04/28/2022   • IR NEPHROSTOMY TUBE CHECK/CHANGE/REPOSITION/REINSERTION/UPSIZE  05/24/2022   • IR NEPHROSTOMY TUBE CHECK/CHANGE/REPOSITION/REINSERTION/UPSIZE  06/07/2022   • IR NEPHROSTOMY TUBE CHECK/CHANGE/REPOSITION/REINSERTION/UPSIZE  07/28/2022   • IR NEPHROSTOMY TUBE CHECK/CHANGE/REPOSITION/REINSERTION/UPSIZE  11/15/2022   • IR NEPHROSTOMY TUBE PLACEMENT  02/25/2022   • IR PORT PLACEMENT  01/17/2022   • IR THORACENTESIS  09/02/2022   • IR THORACENTESIS  09/14/2022   • IR THORACENTESIS WITH TUBE PLACEMENT Left     october   • IR TUNNELED CENTRAL LINE PLACEMENT  12/24/2020   • JOINT REPLACEMENT      christofer knees replaced   • CA AMPUTATION METATARSAL W/TOE SINGLE Left 12/21/2020    Procedure: RAY RESECTION FOOT;  Surgeon: Abraham Flynn DPM;  [facility];  Service: Podiatry   • WA AMPUTATION METATARSAL W/TOE SINGLE Left 12/31/2020    Procedure: 5TH MET RESECTION;  Surgeon: Nael Sanchez DPM;  [facility];  Service: Podiatry   • WA CYSTO W/IRRIG & EVAC MULTPLE OBSTRUCTING CLOTS N/A 02/10/2021    Procedure: CYSTOSCOPY EVACUATION OF CLOTS, fulguration;  Surgeon: Jenna Samayoa MD;  [facility];  Service: Urology   • WA CYSTO W/IRRIG & EVAC MULTPLE OBSTRUCTING CLOTS N/A 10/24/2021    Procedure: CYSTOSCOPY EVACUATION OF CLOT, fulguration of bleeding vessels, right ureter stent placement, retrograde pyelogram;  Surgeon: Leticia Nuñez MD;  [facility];  Service: Urology   • WA CYSTO W/REMOVAL OF LESIONS SMALL N/A 11/19/2020    Procedure: CYSTO W/BIOPSIES, transurethral prostate bx;  Surgeon: Allyson Batista MD;  [facility];  Service: Urology   • WA CYSTOURETHROSCOPY W/DEST &/RMVL TUMOR LARGE Bilateral 10/18/2021    Procedure: TRANSURETHRAL RESECTION OF BLADDER TUMOR (TURBT);   Surgeon: Sean Castle MD;  [facility];  Service: Urology   • WA CYSTOURETHROSCOPY WITH BIOPSY N/A 08/16/2016    Procedure: Estephania Alva;  Surgeon: Andree Holt MD;  [facility];  Service: Urology   • WA Toña Jon 3RD+ ORD [address] PEL/[facility] Left 02/08/2021    Procedure: LEG angiogram, CO2 w/limited contrast with balloon angioplasty postertior tibial artery;  Surgeon: Juancho Brown MD;  [facility];  Service: Vascular   • ROTATOR CUFF REPAIR     • SMALL INTESTINE SURGERY      Surgery Shaji-en-Y   • SPINAL FUSION      lumbar and cervical fusions   • VAC DRESSING APPLICATION Right 38/87/5402    Procedure: APPLICATION VAC DRESSING;  Surgeon: Latisha Ross DPM;  [facility];  Service:    • WOUND DEBRIDEMENT Left 02/16/2021    Procedure: FOOT DEBRIDE, [address] OUT w/graft application;  Surgeon: Latisha Ross DPM;  [facility];  Service: Podiatry       Family History   Problem Relation Age of Onset   • Diabetes Mother    • Heart disease Mother    • Other Mother         High blood pressure   • Heart disease Father    • Diabetes Sister    • Other Sister         High blood pressure   • Kidney disease Sister    • Heart disease Brother    • Other Brother         High blood pressure     I have reviewed and agree with the history as documented  E-Cigarette/Vaping   • E-Cigarette Use Never User      E-Cigarette/Vaping Substances   • Nicotine No    • THC No    • CBD No    • Flavoring No    • Other No    • Unknown No      Social History     Tobacco Use   • Smoking status: Former     Packs/day: 3 00     Years: 27 00     Pack years: 81 00     Types: Cigarettes     Quit date: 1970     Years since quittin 9   • Smokeless tobacco: Never   Vaping Use   • Vaping Use: Never used   Substance Use Topics   • Alcohol use: Never     Comment: beer / liquor   • Drug use: Not Currently     Types: Marijuana     Comment: quit 2019 had medical marijuana        Review of Systems   Constitutional: Positive for fatigue  Negative for chills and fever  HENT: Negative for ear pain, postnasal drip and sore throat  Eyes: Negative for pain and visual disturbance  Respiratory: Negative for cough, chest tightness and shortness of breath  Cardiovascular: Negative for chest pain and palpitations  Gastrointestinal: Negative for abdominal pain, constipation, diarrhea, nausea and vomiting  Genitourinary: Positive for dysuria, flank pain (Bilateral over the site of nephrostomy ), hematuria and penile pain  Negative for urgency  Musculoskeletal: Negative for arthralgias, back pain, myalgias and neck pain  Skin: Negative for color change and rash  Neurological: Positive for weakness  Negative for dizziness, seizures, syncope and light-headedness  All other systems reviewed and are negative        Physical Exam  ED Triage Vitals   Temperature Pulse Respirations Blood Pressure SpO2   22 1345 22 1400 [facility] 12/12/22 1345 12/12/22 1345 12/12/22 1345   97 6 °F (36 4 °C) (!) 111 18 112/75 100 %      Temp Source Heart Rate Source Patient Position - Orthostatic VS BP Location FiO2 (%)   12/12/22 1345 12/12/22 2045 -- -- --   Oral Monitor         Pain Score       12/12/22 1345       6             Orthostatic Vital Signs  Vitals:    12/12/22 1345 12/12/22 1545 12/12/22 1815 12/12/22 2045   BP: 112/75 150/85 120/74 138/79   Pulse: (!) 111 98 90 86       Physical Exam  Vitals and nursing note reviewed  Constitutional:       General: He is not in acute distress  Appearance: Normal appearance  He is well-developed  He is not ill-appearing, toxic-appearing or diaphoretic  HENT:      Head: Normocephalic and atraumatic  Right Ear: Tympanic membrane and external ear normal       Left Ear: Tympanic membrane and external ear normal       Nose: Nose normal  No congestion  Mouth/Throat:      Mouth: Mucous membranes are moist       Pharynx: Oropharynx is clear  No oropharyngeal exudate or posterior oropharyngeal erythema  Eyes:      Extraocular Movements: Extraocular movements intact  Conjunctiva/sclera: Conjunctivae normal       Pupils: Pupils are equal, round, and reactive to light  Cardiovascular:      Rate and Rhythm: Normal rate and regular rhythm  Pulses: Normal pulses  Heart sounds: Normal heart sounds  No murmur heard  Pulmonary:      Effort: Pulmonary effort is normal  No respiratory distress  Breath sounds: Normal breath sounds  No wheezing, rhonchi or rales  Abdominal:      General: Abdomen is flat  There is no distension  Palpations: Abdomen is soft  Tenderness: There is no abdominal tenderness  There is right CVA tenderness and left CVA tenderness  There is no guarding  Comments: Bilateral nephrostomy tubes are in place  Sutures are intact  There is no evidence of cellulitis at the site of insertion  No pus discharge    However, on patient's bed sheets there are several spots of serosanguineous fluid overlying the sites of nephrostomy tubes indicating small amount of leakage  However, there is some serosanguineous fluid in patient's collection drains  Genitourinary:     Penis: Normal        Comments: Patient's wife was at bedside during the examination  There is no blood clot of the meatus no discharge from the penis  Musculoskeletal:         General: No swelling  Cervical back: Normal range of motion and neck supple  No tenderness  Right lower leg: No edema  Left lower leg: No edema  Skin:     General: Skin is warm and dry  Capillary Refill: Capillary refill takes less than 2 seconds  Findings: No erythema or rash  Neurological:      General: No focal deficit present  Mental Status: He is alert and oriented to person, place, and time     Psychiatric:         Mood and Affect: Mood normal          ED Medications  Medications   oxyCODONE (ROXICODONE) IR tablet 5 mg (has no administration in time range)   oxyCODONE (ROXICODONE) immediate release tablet 10 mg (has no administration in time range)   HYDROmorphone (DILAUDID) injection 0 5 mg (has no administration in time range)   cefepime (MAXIPIME) 1,000 mg in dextrose 5 % 50 mL IVPB (has no administration in time range)   cefepime (MAXIPIME) 2 g/50 mL dextrose IVPB (0 mg Intravenous Stopped 12/12/22 1846)   lidocaine (URO-JET) 2 % urethral/mucosal gel 1 application (1 application Urethral Given 12/12/22 1845)   HYDROmorphone (DILAUDID) injection 1 mg (1 mg Intravenous Given 12/12/22 1949)       Diagnostic Studies  Results Reviewed     Procedure Component Value Units Date/Time    HS Troponin I 4hr [[id number]]  (Normal) Collected: 12/12/22 2055    Lab Status: Final result Specimen: Blood from Arm, Right Updated: 12/12/22 2133     hs TnI 4hr 43 ng/L      Delta 4hr hsTnI -14 ng/L     HS Troponin I 2hr [[id number]]  (Normal) Collected: 12/12/22 1805    Lab Status: Final result Specimen: Blood from Arm, Right Updated: 12/12/22 2132     hs TnI 2hr 45 ng/L      Delta 2hr hsTnI -12 ng/L     Urine Microscopic [[id number]]  (Abnormal) Collected: 12/12/22 1359    Lab Status: Final result Specimen: Urine, Other Updated: 12/12/22 1539     RBC, UA Innumerable /hpf      WBC, UA Innumerable /hpf      Epithelial Cells None Seen /hpf      Bacteria, UA Innumerable /hpf      WBC Clumps Present    Urine culture [[id number]] Collected: 12/12/22 1359    Lab Status:  In process Specimen: Urine, Other Updated: 12/12/22 1538    UA w Reflex to Microscopic w Reflex to Culture [[id number]]  (Abnormal) Collected: 12/12/22 1359    Lab Status: Final result Specimen: Urine, Other Updated: 12/12/22 1518     Color, UA Dark [place] Lipa     Clarity, UA Extra Turbid     Specific [place], UA 1 014     pH, UA 5 5     Leukocytes, UA Large     Nitrite, UA Negative     Protein,  (2+) mg/dl      Glucose, UA Negative mg/dl      Ketones, UA Negative mg/dl      Urobilinogen, UA <2 0 mg/dl      Bilirubin, UA Negative     Occult Blood, UA Large    HS Troponin 0hr (reflex protocol) [[id number]]  (Abnormal) Collected: 12/12/22 1358    Lab Status: Final result Specimen: Blood from Arm, Left Updated: 12/12/22 1446     hs TnI 0hr 57 ng/L     Comprehensive metabolic panel [[id number]]  (Abnormal) Collected: 12/12/22 1358    Lab Status: Final result Specimen: Blood from Arm, Left Updated: 12/12/22 1439     Sodium 131 mmol/L      Potassium 3 7 mmol/L      Chloride 102 mmol/L      CO2 20 mmol/L      ANION GAP 9 mmol/L      BUN 51 mg/dL      Creatinine 3 57 mg/dL      Glucose 265 mg/dL      Calcium 9 4 mg/dL      Corrected Calcium 10 4 mg/dL      AST 19 U/L      ALT 21 U/L      Alkaline Phosphatase 114 U/L      Total Protein 7 5 g/dL      Albumin 2 8 g/dL      Total Bilirubin 0 56 mg/dL      eGFR 15 ml/min/1 73sq m     Narrative:      [place] guidelines for Chronic Kidney Disease (CKD):   •  Stage 1 with normal or high GFR (GFR > 90 mL/min/1 73 square meters)  •  Stage 2 Mild CKD (GFR = 60-89 mL/min/1 73 square meters)  •  Stage 3A Moderate CKD (GFR = 45-59 mL/min/1 73 square meters)  •  Stage 3B Moderate CKD (GFR = 30-44 mL/min/1 73 square meters)  •  Stage 4 Severe CKD (GFR = 15-29 mL/min/1 73 square meters)  •  Stage 5 End Stage CKD (GFR <15 mL/min/1 73 square meters)  Note: GFR calculation is accurate only with a steady state creatinine    Protime-INR [[id number]]  (Normal) Collected: 12/12/22 1358    Lab Status: Final result Specimen: Blood from Arm, Left Updated: 12/12/22 1433     Protime 13 5 seconds      INR 1 01    APTT [[id number]]  (Normal) Collected: 12/12/22 1358    Lab Status: Final result Specimen: Blood from Arm, Left Updated: 12/12/22 1433     PTT 34 seconds     CBC and differential [[id number]]  (Abnormal) Collected: 12/12/22 1358    Lab Status: Final result Specimen: Blood from Arm, Left Updated: 12/12/22 1412     WBC 16 61 Thousand/uL      RBC 2 74 Million/uL      Hemoglobin 8 5 g/dL      Hematocrit 26 7 %      MCV 97 fL      MCH 31 0 pg      MCHC 31 8 g/dL      RDW 17 2 %      MPV 10 1 fL      Platelets 628 Thousands/uL      nRBC 0 /100 WBCs      Neutrophils Relative 84 %      Immat GRANS % 1 %      Lymphocytes Relative 7 %      Monocytes Relative 7 %      Eosinophils Relative 1 %      Basophils Relative 0 %      Neutrophils Absolute 13 93 Thousands/µL      Immature Grans Absolute 0 16 Thousand/uL      Lymphocytes Absolute 1 19 Thousands/µL      Monocytes Absolute 1 19 Thousand/µL      Eosinophils Absolute 0 08 Thousand/µL      Basophils Absolute 0 06 Thousands/µL                  CT abdomen pelvis wo contrast   Final Result by Lester Taylor MD (12/12 1806)      Stable bladder wall thickening towards the left compatible with known neoplasm  New right hydronephrosis with high density intraluminal material in the distal right ureter likely representing blood  Stable metastatic retroperitoneal lymphadenopathy  Stable left pleural effusion with epicardial metastasis  Workstation performed: [id number]               Procedures  Procedures      ED Course        Givens patient's history of metastatic cancer, will do broad work-up to include infectious, metabolic, possible hematologic cause of patient's symptoms  Patient does not have any evidence of acute hematuria on my examination, however patient's UA which was completed earlier in ED course is significant for UTI  I will treat with IV fluids and cefepime  Urology consult was obtained for patient's complicated history  They will consult in the ED and likely place a Shirley catheter  Given patient's nephrostomy drainage, will do a CT of the abdomen and pelvis to assess their position and also get a better look at the kidneys  Patient currently not on any painful discomfort  Offered pain medication but he declined  EKG shows normal sinus rhythm at 90 bpm, PVC noted, normal axis, no acute ST-T changes  Diagnostics reviewed  Patient has leukocytosis, evidence of worsening creatinine compared to baseline, as well as a significant troponin level  We will do repeat troponins  Patient is not having any active chest pain  We will continue with IV antibiotic therapy and likely admission to the hospital     Urology consulted at bedside and placed a Shirley catheter  They will see patient on admission  Case was discussed with [facility] attending; arrangements made for admission  Identification of Seniors at [facility] Most Recent Value   (ISAR) Identification of Seniors at Risk    Before the illness or injury that brought you to the Emergency, did you need someone to help you on a regular basis? 1 Filed at: 12/12/2022 1348   In the last 24 hours, have you needed more help than usual? 1 Filed at: 12/12/2022 1348   Have you been hospitalized for one or more nights during the past 6 months? 1 Filed at: 12/12/2022 1348   In general, do you see well? 0 Filed at: 12/12/2022 1348   In general, do you have serious problems with your memory? 0 Filed at: 12/12/2022 1348   Do you take more than three different medications every day? 1 Filed at: 12/12/2022 1348   ISAR Score 4 Filed at: 12/12/2022 1348                              MDM    Disposition  Final diagnoses:   Urinary tract infection   Bladder cancer ([facility] [address] )   Hematuria     Time reflects when diagnosis was documented in both MDM as applicable and the Disposition within this note     Time User Action Codes Description Comment    12/12/2022  6:48 PM Wing Ernst Add [N39 0] Urinary tract infection     12/12/2022  6:48 PM Wing Ernst Add [C67 9] Bladder cancer ([facility] [address] )     12/12/2022  6:48 PM Wing Ernst Add [[id number]] Hematuria       ED Disposition     ED Disposition   Admit    Condition   Stable    Date/Time   Mon Dec 12, 2022  6:49 PM    Comment   Case was discussed with Dr Kevin Roque and the patient's admission status was agreed to be Admission Status: inpatient status to the service of Dr Kevin Roque  Follow-up Information    None         Patient's Medications   Discharge Prescriptions    No medications on file     No discharge procedures on file  PDMP Review       Value Time User    PDMP Reviewed  Yes 11/17/2022  6:15 PM Akbar Fernandez MD           ED Provider  Attending physically available and evaluated Shelbi Like  I managed the patient along with the ED Attending      Electronically Signed by         Jadiel Linares DO  12/12/22 7538

## 2022-12-13 NOTE — ASSESSMENT & PLAN NOTE
Patient with history of metastatic bladder cancer currently undergoing chemotherapy, bilateral nephrostomy tube, recurrent UTI on suppressive Bactrim presented to ER with complaint of ongoing hematuria for several weeks  CT abdomen pelvis reveals new right-sided hydronephrosis with high density intraluminal material in the distal right ureter likely representing blood  Bilateral nephrostomy tube present    Shirley placed in ED  Hemoglobin stable at 8 5  Trend hgb

## 2022-12-13 NOTE — ASSESSMENT & PLAN NOTE
Lab Results   Component Value Date    EGFR 15 12/12/2022    EGFR 18 11/19/2022    EGFR 16 11/18/2022    CREATININE 3 57 (H) 12/12/2022    CREATININE 3 03 (H) 11/19/2022    CREATININE 3 37 (H) 11/18/2022   · Baseline creatinine this year appears to be low to mid-3s  · Monitor renal function

## 2022-12-13 NOTE — ASSESSMENT & PLAN NOTE
· PMH of Recurrent UTI on suppressive bactrim at home, presented with UTI  · Was placed on cefepime on admission pending urine culture

## 2022-12-13 NOTE — ASSESSMENT & PLAN NOTE
Lab Results   Component Value Date    EGFR 15 12/12/2022    EGFR 18 11/19/2022    EGFR 16 11/18/2022    CREATININE 3 57 (H) 12/12/2022    CREATININE 3 03 (H) 11/19/2022    CREATININE 3 37 (H) 11/18/2022     Slight worsening in creatinine noted upon presentation, likely from obstructive uropathy from hematuria and bloodclot and UTI    Will administer 500cc IVF  Hold lasix until creatinine improves  Is worsening, need nephro

## 2022-12-13 NOTE — ASSESSMENT & PLAN NOTE
· Acute on chronic  · CT on admission revealed stable bladder wall thickening toward the left compatible with known neoplasm, new right hydronephrosis with high density intraluminal material in the distal right ureter likely representing blood, stable metastatic retroperitoneal lymphadenopathy, and 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  · Hemoglobin 8 5 on admission; 7 9 today  · Urology consult appreciated - plan to go to OR for cystoscopy, clot evacuation, possible transurethral resection/fulguration of bleeding points within bladder and bilateral retrograde studies  · Monitor H&H  · Patient with bilateral nephrostomy tubes and had Shirley placed in ED

## 2022-12-13 NOTE — ASSESSMENT & PLAN NOTE
Diagnosed in 1994 s/p treatment with BCG, however recurrence, s/p chemoradiation in 2021  Has bilateral nephrostomy tube  Patient also noted to be on prednisone 20 mg daily, patient uncertain why

## 2022-12-13 NOTE — H&P
[address] 14 1945, 76 y o  male MRN: [id number]  Unit/Bed#: ED 13 Encounter: [id number]  Primary Care Provider: Zaira Feliciano MD   Date and time admitted to hospital: 12/12/2022  3:38 PM    * Hematuria  Assessment & Plan  Patient with history of metastatic bladder cancer currently undergoing chemotherapy, bilateral nephrostomy tube, recurrent UTI on suppressive Bactrim presented to ER with complaint of ongoing hematuria for several weeks  CT abdomen pelvis reveals new right-sided hydronephrosis with high density intraluminal material in the distal right ureter likely representing blood  Bilateral nephrostomy tube present  Shirley placed in ED  Hemoglobin stable at 8 5  Trend hgb    UTI (urinary tract infection)  Assessment & Plan  PMH of Recurrent UTI on suppressive bactrim at home, presents with UTI  Start Cefepime  Pending UCx    Chronic pain syndrome  Assessment & Plan  Pain control with oxycodone at home  Placed PRN pain regimen  Continue duloxetine      Moderate major depression, single episode [facility])  Assessment & Plan  Resume Abilify, duloxetine     CKD [facility])  Assessment & Plan  Lab Results   Component Value Date    EGFR 15 12/12/2022    EGFR 18 11/19/2022    EGFR 16 11/18/2022    CREATININE 3 57 (H) 12/12/2022    CREATININE 3 03 (H) 11/19/2022    CREATININE 3 37 (H) 11/18/2022     Slight worsening in creatinine noted upon presentation, likely from obstructive uropathy from hematuria and bloodclot and UTI  Will administer 500cc IVF  Hold lasix until creatinine improves  Is worsening, need nephro    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)  Assessment & Plan  Diagnosed in 1994 s/p treatment with BCG, however recurrence, s/p chemoradiation in 2021  Has bilateral nephrostomy tube  Patient also noted to be on prednisone 20 mg daily, patient uncertain why      Type 2 diabetes mellitus, with long-term current use of insulin [facility])  Assessment & Plan  Lab Results   Component Value Date    HGBA1C 7 2 (H) 10/20/2022       No results for input(s): POCGLU in the last 72 hours  Blood Sugar Average: Last 72 hrs:     resume home insulin lantus 18 units at bedtime, resume Humalog 5 units TIDwm with sliding scale    Hypertension with renal disease  Assessment & Plan  Resume metoprolol, imdur    Coronary artery disease of native artery of native heart with stable angina pectoris (HCC)  Assessment & Plan  Continue metoprolol and isosorbide  Hold aspirin due to hematuria    VTE Prophylaxis: Pharmacologic VTE Prophylaxis contraindicated due to gross hematuria  / sequential compression device   Code Status: full code     Discussion with family: called wife and granddaughter on the phone, no answer    Anticipated Length of Stay:  Patient will be admitted on an Inpatient basis with an anticipated length of stay of  > 2 midnights  Justification for Hospital Stay: GARY, hematuria, UTI    Total Time for Visit, including Counseling / Coordination of Care: 60 minutes  Greater than 50% of this total time spent on direct patient counseling and coordination of care  Chief Complaint:   hematuria    History of Present Illness:    Jose Jiménez is a 76 y o  male with PMH of bladder cancer status post bilateral nephrostomy tube, recurrent UTI on suppressive antibiotic with Bactrim, CKD, IDDM 2, presented to ER with complaint of ongoing hematuria and passing blood clot per urethra for few weeks and is worsening  Patient also noted bloody drainage from the nephrostomy tubes bilaterally as well  Upon arrival to ER, patient noted to have hemoglobin stable at 8 5 which is at his baseline  However noted to have significant leukocytosis 16 K, and UA is remarkable for positive UTI  CT abdomen pelvis reveals new right-sided hydronephrosis with high density intraluminal material in the distal right ureter likely representing blood    Urology consulted in ED, urology placed a Shirley catheter with drainage of dark red urine  Recommended to maintain Shirley catheter until urine starts clearing up  Patient started on IV cefepime  Patient will be admitted for GARY, UTI, and ongoing hematuria  On my exam patient denies any chest pain, dyspnea or short of breath  However he does complain of significant pain in the insertion of the Shirley catheter site  Review of Systems:    Review of Systems   Constitutional: Negative for chills and fever  HENT: Negative for ear pain and sore throat  Eyes: Negative for pain and visual disturbance  Respiratory: Negative for cough and shortness of breath  Cardiovascular: Negative for chest pain and palpitations  Gastrointestinal: Positive for abdominal pain  Negative for vomiting  Genitourinary: Positive for hematuria and penile pain  Negative for dysuria  Musculoskeletal: Negative for arthralgias and back pain  Skin: Negative for color change and rash  Neurological: Negative for seizures and syncope  All other systems reviewed and are negative        Past Medical and Surgical History:     Past Medical History:   Diagnosis Date   • Anemia     Last assessed: 9/28/17   • Anxiety    • Arteriosclerotic cardiovascular disease     Last assessed: 9/28/17   • Arthritis    • Bladder cancer ([facility] )     bladder- had BCG treatments   • Chronic kidney disease     Stage IV   • CKD (chronic kidney disease) stage 4, GFR 15-29 ml/min (HCC)    • Colon polyp    • Coronary artery disease     7 stents   • Depression    • Diabetes mellitus (HCC)     IDDM   • GERD (gastroesophageal reflux disease)    • Glaucoma    • Hematuria    • History of fusion of cervical spine    • Hyperlipidemia    • Hypertension    • Insomnia     Last assessed: 11/14/12   • Loss of hearing     has hearing aids but usually does not wear them   • Lung cancer [facility])    • Metastatic cancer ([facility] )    • Other seasonal allergic rhinitis     Last assessed: 2/10/16   • PAD (peripheral artery disease) (HCC)    • Shortness of breath     on exertion   • Spinal stenosis of lumbar region    • Transient cerebral ischemia     No Residual   • Uses walker     w/c for longer distances       Past Surgical History:   Procedure Laterality Date   • CARDIAC SURGERY      Cath stent placement  Last assessed: 3/9/17  Interventional Catheterization   • CHOLECYSTECTOMY     • COLONOSCOPY     • CYSTOSCOPY      Diagnostic w/biopsy  Mirela Guadarrama  Last assessed: 12/1/14   • CYSTOSCOPY N/A 04/12/2022    Procedure: CYSTOSCOPY  Bladder biopsies  ;  Surgeon: Nesha Park MD;  [facility];  Service: Urology   • CYSTOSCOPY W/ RETROGRADES Right 03/01/2022    Procedure: CYSTO; stent removal retrograde;  Surgeon: Nesha Park MD;  [facility];  Service: Urology   • CYSTOSCOPY W/ URETERAL STENT PLACEMENT Bilateral 10/18/2021    Procedure: bilateral retrogrades, cytology collection;  Surgeon: Nesha Park MD;  [facility];  Service: Urology   • CYSTOURETHROSCOPY      w/cautery  Mirela Guadarrama   • FL RETROGRADE PYELOGRAM  10/18/2021   • FL RETROGRADE PYELOGRAM  10/24/2021   • GASTRIC BYPASS      For morbid obesity w/Shaji-en-Y   Resolved: 11/17/09   • INCISION AND DRAINAGE OF WOUND Right 02/26/2017    Procedure: INCISION AND DRAINAGE (I&D) EXTREMITY WITH APPLICATION OF GRAFT JACKET;  Surgeon: Farooq Sears DPM;  [facility];  Service:    • INCISION AND DRAINAGE OF WOUND Right 04/25/2017    Procedure: INCISION AND DRAINAGE (I&D) EXTREMITY, APPLICATION OF GRAFT;  Surgeon: Farooq Sears DPM;  [facility];  Service:    • IR BIOPSY OTHER  07/02/2020   • IR LOWER EXTREMITY ANGIOGRAM  02/08/2021   • IR LOWER EXTREMITY ANGIOGRAM  02/11/2021   • IR NEPHROSTOMY TUBE CHECK/CHANGE/REPOSITION/REINSERTION/UPSIZE  04/28/2022   • IR NEPHROSTOMY TUBE CHECK/CHANGE/REPOSITION/REINSERTION/UPSIZE  05/24/2022   • IR NEPHROSTOMY TUBE CHECK/CHANGE/REPOSITION/REINSERTION/UPSIZE 06/07/2022   • IR NEPHROSTOMY TUBE CHECK/CHANGE/REPOSITION/REINSERTION/UPSIZE  07/28/2022   • IR NEPHROSTOMY TUBE CHECK/CHANGE/REPOSITION/REINSERTION/UPSIZE  11/15/2022   • IR NEPHROSTOMY TUBE PLACEMENT  02/25/2022   • IR PORT PLACEMENT  01/17/2022   • IR THORACENTESIS  09/02/2022   • IR THORACENTESIS  09/14/2022   • IR THORACENTESIS WITH TUBE PLACEMENT Left     october   • IR TUNNELED CENTRAL LINE PLACEMENT  12/24/2020   • JOINT REPLACEMENT      christofer knees replaced   • DC AMPUTATION METATARSAL W/TOE SINGLE Left 12/21/2020    Procedure: RAY RESECTION FOOT;  Surgeon: Kaci Fuentes DPM;  [facility];  Service: Podiatry   • DC AMPUTATION METATARSAL W/TOE SINGLE Left 12/31/2020    Procedure: 5TH MET RESECTION;  Surgeon: Kaci Fuentes DPM;  [facility];  Service: Podiatry   • DC CYSTO W/IRRIG & EVAC MULTPLE OBSTRUCTING CLOTS N/A 02/10/2021    Procedure: CYSTOSCOPY EVACUATION OF CLOTS, fulguration;  Surgeon: Camilo Lennox, MD;  [facility];  Service: Urology   • DC CYSTO W/IRRIG & EVAC MULTPLE OBSTRUCTING CLOTS N/A 10/24/2021    Procedure: CYSTOSCOPY EVACUATION OF CLOT, fulguration of bleeding vessels, right ureter stent placement, retrograde pyelogram;  Surgeon: Ratna Adorno MD;  [facility];  Service: Urology   • DC CYSTO W/REMOVAL OF LESIONS SMALL N/A 11/19/2020    Procedure: CYSTO W/BIOPSIES, transurethral prostate bx;  Surgeon: Kavin Guy MD;  [facility];  Service: Urology   • DC CYSTOURETHROSCOPY W/DEST &/RMVL TUMOR LARGE Bilateral 10/18/2021    Procedure: TRANSURETHRAL RESECTION OF BLADDER TUMOR (TURBT);   Surgeon: Ron Siu MD;  [facility];  Service: Urology   • DC CYSTOURETHROSCOPY WITH BIOPSY N/A 08/16/2016    Procedure: Alba Lutz;  Surgeon: Luis Muro MD;  [facility];  Service: Urology   • DC Con Clam 3RD+ ORD [address] PEL/LXTR [facility] Left 02/08/2021    Procedure: LEG angiogram, CO2 w/limited contrast with balloon angioplasty postertior tibial artery;  Surgeon: Mark Samson MD;  [facility];  Service: Vascular   • ROTATOR CUFF REPAIR     • SMALL INTESTINE SURGERY      Surgery Shaji-en-Y   • SPINAL FUSION      lumbar and cervical fusions   • VAC DRESSING APPLICATION Right 76/75/5060    Procedure: APPLICATION VAC DRESSING;  Surgeon: Pasquale Ramirez DPM;  [facility];  Service:    • WOUND DEBRIDEMENT Left 02/16/2021    Procedure: FOOT DEBRIDE, [address] OUT w/graft application;  Surgeon: Pasquale Ramirez DPM;  [facility];  Service: Podiatry       Meds/Allergies:    Prior to Admission medications    Medication Sig Start Date End Date Taking?  Authorizing Provider   Accu-Chek Softclix Lancets lancets Test blood sugar 4 times daily 2/23/22   Altagracia Morrow MD   acetaminophen (TYLENOL) 325 mg tablet Take 650 mg by mouth every 6 (six) hours as needed for mild pain      Historical Provider, MD   ARIPiprazole (ABILIFY) 2 mg tablet Take 1 tablet (2 mg total) by mouth daily 6/10/22 11/22/22  April EVANGELINA Miller   aspirin (ECOTRIN LOW STRENGTH) 81 mg EC tablet Take 1 tablet (81 mg total) by mouth daily 11/1/21   Altagracia Morrow MD   Azelastine HCl 0 15 % SOLN Inhale 1 spray 2 (two) times a day 5/14/20   Altagracia Morrow MD   Bimatoprost (LUMIGAN OP) Apply 1 drop to eye daily at bedtime     Historical Provider, MD   calcitriol (ROCALTROL) 0 25 mcg capsule Take 1 capsule (0 25 mcg total) by mouth daily 2/2/22   Altagracia Morrow MD   cetirizine (ZyrTEC) 10 MG chewable tablet Chew 10 mg daily    Historical Provider, MD   cyanocobalamin (VITAMIN B-12) 1000 MCG tablet Take 1 tablet (1,000 mcg total) by mouth daily 11/30/22 2/28/23  Altagracia Morrow MD   DULoxetine (CYMBALTA) 30 mg delayed release capsule TAKE ONE CAPSULE BY MOUTH EVERY DAY 10/10/22   Altagracia Morrow MD   ezetimibe (ZETIA) 10 mg tablet Take 1 tablet (10 mg total) by mouth daily 10/31/22   Altagracia Morrow MD   Ferrous Sulfate Dried (Feosol) 200 (65 Fe) MG TABS Take 65 mg by mouth daily 2/2/22   Amisha Castro MD   fluocinonide (LIDEX) 0 05 % cream Apply topically as needed for irritation 11/19/22   Ralph Bay PA-C   fluticasone [facility]) 50 mcg/act nasal spray 1 spray into each nostril as needed for allergies 11/19/22   Ralph Bay PA-C   furosemide (LASIX) 20 mg tablet Take 1 tablet (20 mg total) by mouth daily 10/31/22   Amisha Castro MD   gabapentin (NEURONTIN) 300 mg capsule TAKE ONE CAPSULE BY MOUTH EVERY DAY AT BEDTIME 10/10/22   Amisha Castro MD   glucose blood (Accu-Chek Martha Plus) test strip Test blood sugar 4 times daily 2/23/22   Amisha Castro MD   hydrocortisone 2 5 % cream Apply topically 2 (two) times a day as needed for irritation or rash 9/29/22   Elijah Frost MD   Insulin Pen Needle 31G X 8 MM MISC Inject 3 times a day 5/8/19   Amisha Castro MD   isosorbide mononitrate (IMDUR) 30 mg 24 hr tablet TAKE ONE TABLET BY MOUTH EVERY DAY IN THE MORNING 12/5/22   Amisha Castro MD   Lantus SoloStar 100 units/mL injection pen 18 units qhs  Patient taking differently: Inject 18 Units under the skin daily at bedtime 6/24/21   Millie Lockett PA-C   lidocaine (XYLOCAINE) 2 % topical gel Apply topically as needed for mild pain 2/17/22   Last Rosado MD   lidocaine-prilocaine (EMLA) cream Apply topically as needed for mild pain 1/10/22   Elijah Frost MD   loperamide (IMODIUM) 2 mg capsule Take 2 mg by mouth 4 (four) times a day as needed for diarrhea    Historical Provider, MD   metoprolol succinate (TOPROL-XL) 100 mg 24 hr tablet TAKE ONE TABLET BY MOUTH EVERY DAY 11/7/22   Amisha Castro MD   multivitamin [facility]) TABS Take 1 tablet by mouth daily  Historical Provider, MD   naloxone (NARCAN) 4 mg/0 1 mL nasal spray Administer 1 spray into a nostril  If no response after 2-3 minutes, give another dose in the other nostril using a new spray   9/1/21   Josephine Blum Eduard Talavera MD   NovoLOG FlexPen 100 units/mL injection pen 10 units with each meal   Patient taking differently: Inject 10 Units under the skin 3 (three) times a day with meals 2/5/21   Monico Jarrett DO   omeprazole (PriLOSEC) 20 mg delayed release capsule Take 1 capsule (20 mg total) by mouth daily in the early morning PRN 10/4/22   Altagracia Morrow MD   ondansetron (Zofran ODT) 8 mg disintegrating tablet Take 1 tablet (8 mg total) by mouth every 8 (eight) hours as needed for nausea or vomiting 9/2/22   Concepción Robles MD   oxybutynin (DITROPAN-XL) 10 MG 24 hr tablet TAKE ONE TABLET BY MOUTH EVERY DAY 12/7/22   Altagracia Morrow MD   oxyCODONE (OxyCONTIN) 10 mg 12 hr tablet Take 1 tablet (10 mg total) by mouth every 8 (eight) hours Max Daily Amount: 30 mg 9/26/22 April D EVANGELINA Byrne   oxyCODONE (Roxicodone) 5 immediate release tablet Take 1 tablet (5 mg total) by mouth every 4 (four) hours as needed for moderate pain Max Daily Amount: 30 mg 8/29/22 April EVANGELINA Miller   Patiromer Sorbitex Calcium 8 4 g PACK Take 8 4 packets by mouth 8/26/22   Historical Provider, MD   phenazopyridine (PYRIDIUM) 200 mg tablet Take 1 tablet (200 mg total) by mouth 3 (three) times a day with meals 6/9/22   Altagracia Morrow MD   polyethylene glycol (MIRALAX) 17 g packet Take 17 g by mouth daily 9/26/22 April EVANGELINA Miller   predniSONE 20 mg tablet Take 1 tablet (20 mg total) by mouth daily 9/2/22   Concepción Robles MD   senna (SENOKOT) 8 6 MG tablet Take 2 tablets (17 2 mg total) by mouth 2 (two) times a day 9/26/22 April EVANGELINA Miller   sodium chloride, PF, 0 9 % 10 mL by Intracatheter route daily Intracatheter flushing daily  Patient not taking: Reported on 11/22/2022 2/25/22 5/26/22  Jemima Shafer MD   sodium chloride, PF, 0 9 % 10 mL by Intracatheter route daily Intracatheter flushing daily  Patient not taking: Reported on 11/22/2022 2/25/22 5/26/22  Jemima Shafer MD   sodium chloride, PF, 0 9 % 10 mL by Intracatheter route daily Bilateral PCNs to be flushed daily with prefilled 10cc NS 22   Roopa Rojas PA-C   tamsulosin [facility]) 0 4 mg Take 1 capsule (0 4 mg total) by mouth daily with dinner 22   Vishnu Jasmine PA-C   zolpidem (AMBIEN) 5 mg tablet Take 1 tablet (5 mg total) by mouth daily at bedtime as needed for sleep 22 D EVANGELINA Byrne   betamethasone valerate (VALISONE) 0 1 % cream Apply topically 2 (two) times a day 21  Kathe Shelton MD     I have reviewed home medications using allscripts  Allergies:    Allergies   Allergen Reactions   • Atorvastatin Hives, Itching and Rash   • Simvastatin Rash and Edema     Edema of lower legs   • Statins Hives and Itching   • Insulin Lispro Swelling and Edema     " Lower Legs"   • Other Itching, Rash and Other (See Comments)     "EKG Patches"   "blue EKG patches"       Social History:     Marital Status: /Civil Union     Substance Use History:   Social History     Substance and Sexual Activity   Alcohol Use Never    Comment: beer / liquor     Social History     Tobacco Use   Smoking Status Former   • Packs/day: 3 00   • Years: 27 00   • Pack years: 81 00   • Types: Cigarettes   • Quit date: 5   • Years since quittin 9   Smokeless Tobacco Never     Social History     Substance and Sexual Activity   Drug Use Not Currently   • Types: Marijuana    Comment: quit 2019 had medical marijuana       Family History:    Family History   Problem Relation Age of Onset   • Diabetes Mother    • Heart disease Mother    • Other Mother         High blood pressure   • Heart disease Father    • Diabetes Sister    • Other Sister         High blood pressure   • Kidney disease Sister    • Heart disease Brother    • Other Brother         High blood pressure       Physical Exam:     Vitals:   Blood Pressure: 120/74 (22 1815)  Pulse: 90 (22 1815)  Temperature: 97 6 °F (36 4 °C) (22 1345)  Temp Source: Oral (12/12/22 1345)  Respirations: 18 (12/12/22 1815)  SpO2: 96 % (12/12/22 1815)    Physical Exam  Vitals and nursing note reviewed  Constitutional:       General: He is in acute distress  Appearance: He is well-developed  He is ill-appearing  HENT:      Head: Normocephalic and atraumatic  Eyes:      Conjunctiva/sclera: Conjunctivae normal    Cardiovascular:      Rate and Rhythm: Normal rate and regular rhythm  Heart sounds: No murmur heard  Pulmonary:      Effort: Pulmonary effort is normal  No respiratory distress  Breath sounds: Normal breath sounds  Abdominal:      Palpations: Abdomen is soft  Tenderness: There is no abdominal tenderness  Musculoskeletal:         General: No swelling  Cervical back: Neck supple  Skin:     General: Skin is warm and dry  Capillary Refill: Capillary refill takes less than 2 seconds  Neurological:      Mental Status: He is alert  Additional Data:     Lab Results: I have personally reviewed pertinent reports  Results from last 7 days   Lab Units 12/12/22  1358   WBC Thousand/uL 16 61*   HEMOGLOBIN g/dL 8 5*   HEMATOCRIT % 26 7*   PLATELETS Thousands/uL 247   NEUTROS PCT % 84*   LYMPHS PCT % 7*   MONOS PCT % 7   EOS PCT % 1     Results from last 7 days   Lab Units 12/12/22  1358   SODIUM mmol/L 131*   POTASSIUM mmol/L 3 7   CHLORIDE mmol/L 102   CO2 mmol/L 20*   BUN mg/dL 51*   CREATININE mg/dL 3 57*   ANION GAP mmol/L 9   CALCIUM mg/dL 9 4   ALBUMIN g/dL 2 8*   TOTAL BILIRUBIN mg/dL 0 56   ALK PHOS U/L 114   ALT U/L 21   AST U/L 19   GLUCOSE RANDOM mg/dL 265*     Results from last 7 days   Lab Units 12/12/22  1358   INR  1 01                   Imaging: I have personally reviewed pertinent reports  CT abdomen pelvis wo contrast   Final Result by Mary Luke MD (12/12 1806)      Stable bladder wall thickening towards the left compatible with known neoplasm        New right hydronephrosis with high density intraluminal material in the distal right ureter likely representing blood  Stable metastatic retroperitoneal lymphadenopathy  Stable left pleural effusion with epicardial metastasis  Workstation performed: [id number]             ** Please Note: This note has been constructed using a voice recognition system   **

## 2022-12-13 NOTE — ED NOTES
Pt ambulated to bathroom with walker, no dizziness, steady gait     Miguel A Raymundo  12/12/22 7427

## 2022-12-13 NOTE — PROGRESS NOTES
[facility]  Progress Note - Karely Margaret 1945, 76 y o  male MRN: [id number]  Unit/Bed#: [facility] 917-01 Encounter: [id number]  Primary Care Provider: Jacob Smith MD   Date and time admitted to hospital: 12/12/2022  3:38 PM    * Hematuria  Assessment & Plan  · Acute on chronic  · CT on admission revealed stable bladder wall thickening toward the left compatible with known neoplasm, new right hydronephrosis with high density intraluminal material in the distal right ureter likely representing blood, stable metastatic retroperitoneal lymphadenopathy, and 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  · Hemoglobin 8 5 on admission; 7 9 today  · Urology consult appreciated - plan to go to OR for cystoscopy, clot evacuation, possible transurethral resection/fulguration of bleeding points within bladder and bilateral retrograde studies  · Monitor H&H  · Patient with bilateral nephrostomy tubes and had Shirley placed in ED    Malignant neoplasm of anterior wall of urinary bladder ([address] )  Assessment & Plan  · Diagnosed in 1994 s/p treatment with BCG, however recurrence, s/p chemoradiation in 2021  · Has bilateral nephrostomy tube  · Patient also noted to be on prednisone 20 mg daily, patient uncertain why    · Urology following     UTI (urinary tract infection)  Assessment & Plan  · PMH of Recurrent UTI on suppressive bactrim at home, presented with UTI  · Was placed on cefepime on admission pending urine culture    CKD (chronic kidney disease)  Assessment & Plan  Lab Results   Component Value Date    EGFR 15 12/12/2022    EGFR 18 11/19/2022    EGFR 16 11/18/2022    CREATININE 3 57 (H) 12/12/2022    CREATININE 3 03 (H) 11/19/2022    CREATININE 3 37 (H) 11/18/2022   · Baseline creatinine this year appears to be low to mid-3s  · Monitor renal function    Type 2 diabetes mellitus, with long-term current use of insulin [facility])  Assessment & Plan  Lab Results   Component Value Date    HGBA1C 7 2 (H) 10/20/2022       Recent Labs     22  0615 22  1220   POCGLU 318* 168*       Blood Sugar Average: Last 72 hrs:  (P) 243     · Continue home insulin lantus 18 units at bedtime, Humalog 5 units TID   · Sliding scale    Coronary artery disease of native artery of native heart with stable angina pectoris ([facility])  Assessment & Plan  · Continue metoprolol and isosorbide  · Hold aspirin due to hematuria    PAD (peripheral artery disease) ([facility])  Assessment & Plan  · Aspirin on hold as above    Chronic pain syndrome  Assessment & Plan  · Pain control with oxycodone at home  · Pain control       Hypertension with renal disease  Assessment & Plan  · Continue metoprolol, imdur    Moderate major depression, single episode ([facility])  Assessment & Plan  · Continue home meds        VTE Pharmacologic Prophylaxis:   pharmacologic DVT ppx contraindicated given hematuria as above    Patient Centered Rounds: I performed bedside rounds with nursing staff today  Discussions with Specialists or Other Care Team Provider:     Education and Discussions with Family / Patient: Patient declined call to   Time Spent for Care: 30 minutes  More than 50% of total time spent on counseling and coordination of care as described above  Current Length of Stay: 1 day(s)  Current Patient Status: Inpatient   Certification Statement: The patient will continue to require additional inpatient hospital stay due to hematuria, Urology taking to OR, IV abx and pending urine culture  Discharge Plan: Anticipate discharge in 48-72 hrs to pending the above    Code Status: Level 1 - Full Code    Subjective:   Mr Marleny Hernandez denies any pain  He reports feeling fatigued  Denies CP   Denies abdominal pain    Objective:     Vitals:   Temp (24hrs), Av 3 °F (36 3 °C), Min:97 3 °F (36 3 °C), Max:97 3 °F (36 3 °C)    Temp:  [97 3 °F (36 3 °C)] 97 3 °F (36 3 °C)  HR:  [] 72  Resp:  [16-18] 16  BP: ()/(60-86) 98/60  SpO2:  [96 %-100 %] 97 %  Body mass index is 26 5 kg/m²  Input and Output Summary (last 24 hours): Intake/Output Summary (Last 24 hours) at 12/13/2022 1622  Last data filed at 12/13/2022 1409  Gross per 24 hour   Intake 550 ml   Output 300 ml   Net 250 ml       Physical Exam:   Physical Exam  Vitals reviewed  Constitutional:       Comments: Patient seen sitting in ED bed, NAD   Cardiovascular:      Rate and Rhythm: Normal rate and regular rhythm  Pulmonary:      Effort: Pulmonary effort is normal  No respiratory distress  Breath sounds: Normal breath sounds  Abdominal:      General: Bowel sounds are normal       Palpations: Abdomen is soft  Tenderness: There is no abdominal tenderness  Genitourinary:     Comments: Right nephrostomy tube bag with dark maroon urine  Yellow urine in left sided nephrostomy tube bag  Shirley bag with red urine  Musculoskeletal:      Right lower leg: No edema  Left lower leg: No edema  Skin:     General: Skin is warm  Neurological:      Mental Status: He is alert and oriented to person, place, and time     Psychiatric:         Mood and Affect: Mood normal          Behavior: Behavior normal           Additional Data:     Labs:  Results from last 7 days   Lab Units 12/13/22  0456 12/12/22  1358   WBC Thousand/uL 12 95* 16 61*   HEMOGLOBIN g/dL 7 9* 8 5*   HEMATOCRIT % 24 4* 26 7*   PLATELETS Thousands/uL 235 247   NEUTROS PCT %  --  84*   LYMPHS PCT %  --  7*   MONOS PCT %  --  7   EOS PCT %  --  1     Results from last 7 days   Lab Units 12/13/22  0456 12/12/22  1358   SODIUM mmol/L 135 131*   POTASSIUM mmol/L 3 8 3 7   CHLORIDE mmol/L 105 102   CO2 mmol/L 22 20*   BUN mg/dL 50* 51*   CREATININE mg/dL 3 46* 3 57*   ANION GAP mmol/L 8 9   CALCIUM mg/dL 9 1 9 4   ALBUMIN g/dL  --  2 8*   TOTAL BILIRUBIN mg/dL  --  0 56   ALK PHOS U/L  --  114   ALT U/L  --  21   AST U/L  --  19   GLUCOSE RANDOM mg/dL 282* 265*     Results from last 7 days   Lab Units 12/12/22  1358   INR  1 01     Results from last 7 days   Lab Units 12/13/22  1220 12/13/22  0615   POC GLUCOSE mg/dl 168* 318*               Lines/Drains:  Invasive Devices     Central Venous Catheter Line  Duration           Port A Cath Right Chest -- days          Peripheral Intravenous Line  Duration           Peripheral IV 12/12/22 Distal;Right;Upper;Ventral (anterior) Arm <1 day          Drain  Duration           Nephrostomy Left 10 2 Fr  28 days    Nephrostomy Right 10 2 Fr  28 days    Urethral Catheter Latex; Three way 24 Fr  <1 day              Urinary Catheter:  Goal for removal: continue wagner per Urology consult                  Telemetry:  Telemetry Orders (From admission, onward)             48 Hour Telemetry Monitoring  Continuous x 48 hours        References:    Telemetry Guidelines   Question:  Reason for 48 Hour Telemetry  Answer:  Acute MI, chest pain - R/O MI, or unstable angina                 Telemetry Reviewed: PVCs  Indication for Continued Telemetry Use: No indication for continued use  Will discontinue                Imaging: Reviewed radiology reports from this admission including: abdominal/pelvic CT    Recent Cultures (last 7 days):         Last 24 Hours Medication List:   Current Facility-Administered Medications   Medication Dose Route Frequency Provider Last Rate   • acetaminophen  650 mg Oral Q6H PRN Sanju Villegas MD     • ARIPiprazole  2 mg Oral Daily Sanju Villegas MD     • azelastine  1 spray Each Nare BID Sanju Villegas MD     • bimatoprost  1 drop Both Eyes HS Sanju Villegas MD     • calcitriol  0 25 mcg Oral Daily Sanju Villegas MD     • cefepime  1,000 mg Intravenous Q24H Snaju Villegas MD     • DULoxetine  30 mg Oral Daily Sanju Villegas MD     • ezetimibe  10 mg Oral Daily Michael Frye MD     • ferrous sulfate  325 mg Oral Daily With Melvin Buckner MD     • fluticasone  1 spray Nasal Daily Sanju Villegas MD     • gabapentin  300 mg Oral HS Sanju Villegas MD     • HYDROmorphone  0 5 mg Intravenous Q4H PRN Josiah Herrera MD     • insulin glargine  18 Units Subcutaneous HS Josiah Herrera MD     • insulin lispro  1-5 Units Subcutaneous HS Josiah Herrera MD     • insulin lispro  1-6 Units Subcutaneous TID Kayla Dutta MD     • insulin lispro  5 Units Subcutaneous TID With Josselin Klein MD     • isosorbide mononitrate  30 mg Oral QAM Josiah Herrera MD     • metoprolol succinate  100 mg Oral Daily Josiah Herrera MD     • ondansetron  4 mg Intravenous Q6H PRN Josiah Herrera MD     • oxybutynin  10 mg Oral Daily Josiah Herrera MD     • oxyCODONE  10 mg Oral Q6H PRN Josiah Herrera MD     • oxyCODONE  5 mg Oral Q6H PRN Josiah Herrera MD     • pantoprazole  40 mg Oral Early Morning Josiah Herrera MD     • polyethylene glycol  17 g Oral Daily Josiah Herrera MD     • predniSONE  20 mg Oral Daily Josiah Herrera MD     • senna  2 tablet Oral BID Josiah Herrera MD     • tamsulosin  0 4 mg Oral Daily With Leonel Reaves MD     • zolpidem  5 mg Oral HS PRN Josiah Herrera MD          Today, Patient Was Seen By: Enmanuel Raphael PA-C    **Please Note: This note may have been constructed using a voice recognition system  **

## 2022-12-13 NOTE — PROGRESS NOTES
Consult - Urology   Desiree Swain 1945, 76 y o  male MRN: [id number]    Unit/Bed#: ED 13 Encounter: [id number]      Assessment/Plan     76year old male with BCG refractory high grade bladder cancer with pelvic and epicardial  metastases s/p chemoradiation 2021 and undergoing current immunotherapy  Bilateral PCN placement for bilateral distal ureteral obstruction due to tumor obstruction  Acute on chronic hematuria per urethra and PCN tubes  CT scan obtained on 12/12 demonstrated suspected coagulated blood in distal right ureter which may explain development of right sided hydroureteronephrosis despite right PCN  -Plan for OR tomorrow for cystoscopy/clot evacuation/retrograde pyelogram per Dr Anastasia Javier  Case request placed and consent obtained  -Urine Culture still pending  Patient currently on cefepime for UTI per primary team  Order for Pre-Op one time dose Cipro placed due to history of recurrent UTI  -NPO after midnight   -Keep wagner to gravity and continue with q4 manual flushes  -PCNs draining well when flushed    -Can consider IR consult if continued sensation of leakage from PCNS  No leakage noted on exam today  Possible that the tubes were blocked with coagulated blood and urine was leaking around    -Cr 3 46 which appears to be stable and near baseline for patient  Continue to monitor     -Hemoglobin 7 9 from 8 5  continue to monitor  -WBC 12 9 down from 16 6       Subjective:   Patient unfortunately reports that he ate some breakfast today precluding surgery today  wagner revealed dark fruit punch colored urine  Right PCN draining bloody urine that was cleared with irrigation  Right drain was draining clear yellow urine with some sediment  Review of Systems   Constitutional: Positive for fatigue  Negative for chills  HENT: Negative  Eyes: Negative  Respiratory: Negative for shortness of breath  Cardiovascular: Negative for chest pain and palpitations     Gastrointestinal: Negative for abdominal distention and abdominal pain  Endocrine: Negative  Genitourinary: Positive for flank pain (right ) and hematuria  Negative for decreased urine volume, difficulty urinating and dysuria  Neurological: Positive for weakness  Psychiatric/Behavioral: Negative  Objective:  Vitals: Blood pressure 151/86, pulse 80, temperature 97 6 °F (36 4 °C), temperature source Oral, resp  rate 16, SpO2 99 %  ,There is no height or weight on file to calculate BMI  Physical Exam  Constitutional:       General: He is not in acute distress  Appearance: He is normal weight  He is not toxic-appearing  HENT:      Head: Normocephalic and atraumatic  Cardiovascular:      Rate and Rhythm: Normal rate  Pulmonary:      Effort: Pulmonary effort is normal  No respiratory distress  Abdominal:      General: Abdomen is flat  There is no distension  Genitourinary:     Comments: Dark red bloody urine per Right PCN  Cleared with irrigation     Clear yellow urine per left PCN    Darker fruit punch colored with no clots but some sediment in urine per wagner  Partially cleared in color with manual irrigation  Musculoskeletal:      Cervical back: Normal range of motion and neck supple  Skin:     General: Skin is warm and dry  Neurological:      General: No focal deficit present  Mental Status: He is alert and oriented to person, place, and time  Mental status is at baseline  Psychiatric:         Mood and Affect: Mood normal          Behavior: Behavior normal          Thought Content: Thought content normal          Judgment: Judgment normal          Imaging:    CT abd/pelvis 12/12/2022    FINDINGS:     ABDOMEN     LOWER CHEST:  Stable left pleural effusion with drainage catheter in place  Stable adjacent atelectasis    Stable epicardial nodules likely lymph nodes the largest unchanged measuring 3 6 cm      LIVER/BILIARY TREE:  Unremarkable      GALLBLADDER:  Removed      SPLEEN: Unremarkable      PANCREAS:  Unremarkable      ADRENAL GLANDS:  Unremarkable      KIDNEYS/URETERS:  Interval development of right-sided hydroureteronephrosis despite the low percutaneous nephrostomy tube  Hypodensity in the distal right ureter likely blood  Stable appearance of the left kidney with left-sided percutaneous   nephrostomy tube  Stable left renal cyst      STOMACH AND BOWEL:  Status post gastric bypass surgery  No bowel obstruction      APPENDIX:  No findings to suggest appendicitis      ABDOMINOPELVIC CAVITY:  No ascites  No pneumoperitoneum  Stable retroperitoneal adenopathy the largest measuring 2 2 cm on image 2/42      VESSELS:  Unremarkable for patient's age      PELVIS     REPRODUCTIVE ORGANS:  Unremarkable for patient's age      URINARY BLADDER:  Stable appearance of the bladder with asymmetric thickening of the left bladder wall in keeping with history of bladder cancer      ABDOMINAL WALL/INGUINAL REGIONS:  Unremarkable      OSSEOUS STRUCTURES:  No acute fracture or destructive osseous lesion      IMPRESSION:     Stable bladder wall thickening towards the left compatible with known neoplasm      New right hydronephrosis with high density intraluminal material in the distal right ureter likely representing blood      Stable metastatic retroperitoneal lymphadenopathy      Stable left pleural effusion with epicardial metastasis  Labs:  Recent Labs     12/12/22  1358 12/13/22  0456   WBC 16 61* 12 95*     Recent Labs     12/12/22  1358 12/13/22  0456   HGB 8 5* 7 9*       Recent Labs     12/12/22  1358 12/13/22  0456   CREATININE 3 57* 3 46*       Microbiology:  Culture pending from R nephrostomy      History:  Social History     Socioeconomic History   • Marital status: /Civil Union     Spouse name: None   • Number of children: None   • Years of education: None   • Highest education level: None   Occupational History   • None   Tobacco Use   • Smoking status: Former     Packs/day: 3 00     Years: 27 00     Pack years: 81 00     Types: Cigarettes     Quit date:  [address]     Years since quittin 9   • Smokeless tobacco: Never   Vaping Use   • Vaping Use: Never used   Substance and Sexual Activity   • Alcohol use: Never     Comment: beer / liquor   • Drug use: Not Currently     Types: Marijuana     Comment: quit 2019 had medical marijuana   • Sexual activity: Not Currently   Other Topics Concern   • None   Social History Narrative    Consumes 1 cup of coffee and 1 soda per day     Social Determinants of Health     Financial Resource Strain: Not on file   Food Insecurity: No Food Insecurity   • Worried About Running Out of Food in the Last Year: Never true   • Ran Out of Food in the Last Year: Never true   Transportation Needs: No Transportation Needs   • Lack of Transportation (Medical): No   • Lack of Transportation (Non-Medical):  No   Physical Activity: Not on file   Stress: Not on file   Social Connections: Not on file   Intimate Partner Violence: Not on file   Housing Stability: Unknown   • Unable to Pay for Housing in the Last Year: No   • Number of Places Lived in the Last Year: 1   • Unstable Housing in the Last Year: Not on file       Past Medical History:   Diagnosis Date   • Anemia     Last assessed: 17   • Anxiety    • Arteriosclerotic cardiovascular disease     Last assessed: 17   • Arthritis    • Bladder cancer ([facility] )     bladder- had BCG treatments   • Chronic kidney disease     Stage IV   • CKD (chronic kidney disease) stage 4, GFR 15-29 ml/min ([facility])    • Colon polyp    • Coronary artery disease     7 stents   • Depression    • Diabetes mellitus ([facility])     IDDM   • GERD (gastroesophageal reflux disease)    • Glaucoma    • Hematuria    • History of fusion of cervical spine    • Hyperlipidemia    • Hypertension    • Insomnia     Last assessed: 12   • Loss of hearing     has hearing aids but usually does not wear them   • Lung cancer [facility])    • Metastatic cancer ([facility] ) • Other seasonal allergic rhinitis     Last assessed: 2/10/16   • PAD (peripheral artery disease) ([facility])    • Shortness of breath     on exertion   • Spinal stenosis of lumbar region    • Transient cerebral ischemia     No Residual   • Uses walker     w/c for longer distances     Past Surgical History:   Procedure Laterality Date   • CARDIAC SURGERY      Cath stent placement  Last assessed: 3/9/17  Interventional Catheterization   • CHOLECYSTECTOMY     • COLONOSCOPY     • CYSTOSCOPY      Diagnostic w/biopsy  Bao Jessi  Last assessed: 12/1/14   • CYSTOSCOPY N/A 04/12/2022    Procedure: CYSTOSCOPY  Bladder biopsies  ;  Surgeon: Alba Garzon MD;  [facility];  Service: Urology   • CYSTOSCOPY W/ RETROGRADES Right 03/01/2022    Procedure: CYSTO; stent removal retrograde;  Surgeon: Alba Garzon MD;  [facility];  Service: Urology   • CYSTOSCOPY W/ URETERAL STENT PLACEMENT Bilateral 10/18/2021    Procedure: bilateral retrogrades, cytology collection;  Surgeon: Alba Garzon MD;  [facility];  Service: Urology   • CYSTOURETHROSCOPY      w/cautery  Bao Jessi   • FL RETROGRADE PYELOGRAM  10/18/2021   • FL RETROGRADE PYELOGRAM  10/24/2021   • GASTRIC BYPASS      For morbid obesity w/Shaji-en-Y   Resolved: 11/17/09   • INCISION AND DRAINAGE OF WOUND Right 02/26/2017    Procedure: INCISION AND DRAINAGE (I&D) EXTREMITY WITH APPLICATION OF GRAFT JACKET;  Surgeon: Huyen Guerrero DPM;  [facility];  Service:    • INCISION AND DRAINAGE OF WOUND Right 04/25/2017    Procedure: INCISION AND DRAINAGE (I&D) EXTREMITY, APPLICATION OF GRAFT;  Surgeon: Huyen Guerrero DPM;  [facility];  Service:    • IR BIOPSY OTHER  07/02/2020   • IR LOWER EXTREMITY ANGIOGRAM  02/08/2021   • IR LOWER EXTREMITY ANGIOGRAM  02/11/2021   • IR NEPHROSTOMY TUBE CHECK/CHANGE/REPOSITION/REINSERTION/UPSIZE  04/28/2022   • IR NEPHROSTOMY TUBE CHECK/CHANGE/REPOSITION/REINSERTION/UPSIZE  05/24/2022 • IR NEPHROSTOMY TUBE CHECK/CHANGE/REPOSITION/REINSERTION/UPSIZE  06/07/2022   • IR NEPHROSTOMY TUBE CHECK/CHANGE/REPOSITION/REINSERTION/UPSIZE  07/28/2022   • IR NEPHROSTOMY TUBE CHECK/CHANGE/REPOSITION/REINSERTION/UPSIZE  11/15/2022   • IR NEPHROSTOMY TUBE PLACEMENT  02/25/2022   • IR PORT PLACEMENT  01/17/2022   • IR THORACENTESIS  09/02/2022   • IR THORACENTESIS  09/14/2022   • IR THORACENTESIS WITH TUBE PLACEMENT Left     october   • IR TUNNELED CENTRAL LINE PLACEMENT  12/24/2020   • JOINT REPLACEMENT      christofer knees replaced   • SD AMPUTATION METATARSAL W/TOE SINGLE Left 12/21/2020    Procedure: RAY RESECTION FOOT;  Surgeon: Monroe Palacios DPM;  [facility];  Service: Podiatry   • SD AMPUTATION METATARSAL W/TOE SINGLE Left 12/31/2020    Procedure: 5TH MET RESECTION;  Surgeon: Monroe Palacios DPM;  [facility];  Service: Podiatry   • SD CYSTO W/IRRIG & EVAC MULTPLE OBSTRUCTING CLOTS N/A 02/10/2021    Procedure: CYSTOSCOPY EVACUATION OF CLOTS, fulguration;  Surgeon: Storm Bautista MD;  [facility];  Service: Urology   • SD CYSTO W/IRRIG & EVAC MULTPLE OBSTRUCTING CLOTS N/A 10/24/2021    Procedure: CYSTOSCOPY EVACUATION OF CLOT, fulguration of bleeding vessels, right ureter stent placement, retrograde pyelogram;  Surgeon: Lawyer Tamie MD;  [facility];  Service: Urology   • SD CYSTO W/REMOVAL OF LESIONS SMALL N/A 11/19/2020    Procedure: CYSTO W/BIOPSIES, transurethral prostate bx;  Surgeon: Anuja Winston MD;  [facility];  Service: Urology   • SD CYSTOURETHROSCOPY W/DEST &/RMVL TUMOR LARGE Bilateral 10/18/2021    Procedure: TRANSURETHRAL RESECTION OF BLADDER TUMOR (TURBT);   Surgeon: Glenis Bustos MD;  [facility];  Service: Urology   • SD CYSTOURETHROSCOPY WITH BIOPSY N/A 08/16/2016    Procedure: Taya Sour;  Surgeon: Angelika Stroud MD;  [facility];  Service: Urology   • SD SLCTV CATHJ 3RD+ ORD SLCTV ABDL PEL/LXTR [facility] Left 02/08/2021    Procedure: LEG angiogram, CO2 w/limited contrast with balloon angioplasty postertior tibial artery;  Surgeon: Moisés Hernandez MD;  [facility];  Service: Vascular   • ROTATOR CUFF REPAIR     • SMALL INTESTINE SURGERY      Surgery Shaji-en-Y   • SPINAL FUSION      lumbar and cervical fusions   • VAC DRESSING APPLICATION Right 82/54/3170    Procedure: APPLICATION VAC DRESSING;  Surgeon: Gera Domínguez DPM;  [facility];  Service:    • WOUND DEBRIDEMENT Left 02/16/2021    Procedure: FOOT DEBRIDE, [address] OUT w/graft application;  Surgeon: Gera Domínguez DPM;  [facility];  Service: Podiatry     Family History   Problem Relation Age of Onset   • Diabetes Mother    • Heart disease Mother    • Other Mother         High blood pressure   • Heart disease Father    • Diabetes Sister    • Other Sister         High blood pressure   • Kidney disease Sister    • Heart disease Brother    • Other Brother         High blood pressure       Regina Maldonado  Date: 12/13/2022 Time: 10:06 AM

## 2022-12-13 NOTE — ASSESSMENT & PLAN NOTE
Lab Results   Component Value Date    HGBA1C 7 2 (H) 10/20/2022       No results for input(s): POCGLU in the last 72 hours      Blood Sugar Average: Last 72 hrs:     resume home insulin lantus 18 units at bedtime, resume Humalog 5 units TIDwm with sliding scale

## 2022-12-13 NOTE — ASSESSMENT & PLAN NOTE
Lab Results   Component Value Date    HGBA1C 7 2 (H) 10/20/2022       Recent Labs     12/13/22  0615 12/13/22  1220   POCGLU 318* 168*       Blood Sugar Average: Last 72 hrs:  (P) 243     · Continue home insulin lantus 18 units at bedtime, Humalog 5 units TID   · Sliding scale

## 2022-12-13 NOTE — ANESTHESIA PREPROCEDURE EVALUATION
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 s/p chemoradiation 2021 and undergoing current immunotherapy  Bilateral PCN placement for bilateral distal ureteral obstruction due to tumor obstruction  Acute on chronic hematuria per urethra and PCN tubes    Relevant Problems   CARDIO   (+) Coronary artery disease of native artery of native heart with stable angina pectoris (HCC)   (+) Hyperlipidemia   (+) Hypertension with renal disease   (+) Stable angina pectoris (HCC)      ENDO   (+) Secondary renal hyperparathyroidism (HCC)   (+) Type 2 diabetes mellitus, with long-term current use of insulin (HCC)      /RENAL   (+) Acute kidney injury superimposed on CKD (HCC)   (+) Acute renal failure (ARF) (HCC)   (+) Bilateral hydronephrosis   (+) CKD (chronic kidney disease)      HEMATOLOGY   (+) Anemia of chronic disease   (+) Chronic anemia   (+) Thrombocytopenia (HCC)      MUSCULOSKELETAL   (+) Back pain   (+) Degeneration of lumbar intervertebral disc   (+) Lumbar spondylosis   (+) Primary osteoarthritis of left knee      NEURO/PSYCH   (+) Chronic pain syndrome   (+) Continuous opioid dependence (HCC)   (+) Depression with suicidal ideation   (+) Diabetic polyneuropathy associated with type 2 diabetes mellitus (HCC)   (+) Moderate major depression, single episode (HCC)   (+) Stable angina pectoris (HCC)      PULMONARY   (+) Chronic bronchitis (HCC)   (+) Recurrent left pleural effusion   (+) Shortness of breath      Other   (+) Retroperitoneal lymphadenopathy   Prev LMA 4, prev easy intubation    *Patient also noted to be on prednisone 20 mg daily, patient uncertain why >> per patient they asked his daughter and she thinks he actually hasnt actually started it/been taking it      TTE 2020  SUMMARY     LEFT VENTRICLE:  Systolic function was normal  Ejection fraction was estimated to be 60 %  There were no regional wall motion abnormalities  Wall thickness was mildly to moderately increased      LEFT ATRIUM:  The atrium was mildly dilated      MITRAL VALVE:  There was mild to moderate annular calcification  There was mild regurgitation      AORTIC VALVE:  There was trace regurgitation      TRICUSPID VALVE:  There was mild regurgitation  Recent labs personally reviewed:  Lab Results   Component Value Date    WBC 12 95 (H) 12/13/2022    HGB 7 9 (L) 12/13/2022     12/13/2022     Lab Results   Component Value Date     09/03/2015    K 3 8 12/13/2022    BUN 50 (H) 12/13/2022    CREATININE 3 46 (H) 12/13/2022    GLUCOSE 203 (H) 09/03/2015     Lab Results   Component Value Date    PTT 34 12/12/2022      Lab Results   Component Value Date    INR 1 01 12/12/2022       Lab Results   Component Value Date    HGBA1C 7 2 (H) 10/20/2022       Type and Screen:  O         Physical Exam    Airway    Mallampati score: III         Dental       Cardiovascular      Pulmonary  Breath sounds clear to auscultation,     Other Findings        Anesthesia Plan  ASA Score- 3     Anesthesia Type- general with ASA Monitors  Additional Monitors:   Airway Plan: LMA  Plan Factors-Exercise tolerance (METS): <4 METS  Chart reviewed  Patient summary reviewed  Induction- intravenous  Postoperative Plan-   Planned trial extubation    Informed Consent- Anesthetic plan and risks discussed with patient  I personally reviewed this patient with the CRNA  Discussed and agreed on the Anesthesia Plan with the CRNA  Logan Chu

## 2022-12-14 ENCOUNTER — TELEPHONE (OUTPATIENT)
Dept: OTHER | Facility: HOSPITAL | Age: 77
End: 2022-12-14

## 2022-12-14 ENCOUNTER — TELEPHONE (OUTPATIENT)
Dept: UROLOGY | Facility: CLINIC | Age: 77
End: 2022-12-14

## 2022-12-14 ENCOUNTER — APPOINTMENT (INPATIENT)
Dept: RADIOLOGY | Facility: HOSPITAL | Age: 77
End: 2022-12-14

## 2022-12-14 ENCOUNTER — ANESTHESIA (INPATIENT)
Dept: PERIOP | Facility: HOSPITAL | Age: 77
End: 2022-12-14

## 2022-12-14 LAB
ANION GAP SERPL CALCULATED.3IONS-SCNC: 6 MMOL/L (ref 4–13)
BACTERIA UR CULT: ABNORMAL
BACTERIA UR CULT: ABNORMAL
BUN SERPL-MCNC: 53 MG/DL (ref 5–25)
CALCIUM SERPL-MCNC: 9 MG/DL (ref 8.3–10.1)
CHLORIDE SERPL-SCNC: 106 MMOL/L (ref 96–108)
CO2 SERPL-SCNC: 23 MMOL/L (ref 21–32)
CREAT SERPL-MCNC: 3.8 MG/DL (ref 0.6–1.3)
ERYTHROCYTE [DISTWIDTH] IN BLOOD BY AUTOMATED COUNT: 17.4 % (ref 11.6–15.1)
GFR SERPL CREATININE-BSD FRML MDRD: 14 ML/MIN/1.73SQ M
GLUCOSE SERPL-MCNC: 139 MG/DL (ref 65–140)
GLUCOSE SERPL-MCNC: 139 MG/DL (ref 65–140)
GLUCOSE SERPL-MCNC: 141 MG/DL (ref 65–140)
GLUCOSE SERPL-MCNC: 263 MG/DL (ref 65–140)
GLUCOSE SERPL-MCNC: 267 MG/DL (ref 65–140)
HCT VFR BLD AUTO: 22.1 % (ref 36.5–49.3)
HCT VFR BLD AUTO: 23.8 % (ref 36.5–49.3)
HGB BLD-MCNC: 7 G/DL (ref 12–17)
HGB BLD-MCNC: 7.1 G/DL (ref 12–17)
MCH RBC QN AUTO: 30.4 PG (ref 26.8–34.3)
MCHC RBC AUTO-ENTMCNC: 31.7 G/DL (ref 31.4–37.4)
MCV RBC AUTO: 96 FL (ref 82–98)
PLATELET # BLD AUTO: 230 THOUSANDS/UL (ref 149–390)
PMV BLD AUTO: 10.2 FL (ref 8.9–12.7)
POTASSIUM SERPL-SCNC: 4 MMOL/L (ref 3.5–5.3)
RBC # BLD AUTO: 2.3 MILLION/UL (ref 3.88–5.62)
SODIUM SERPL-SCNC: 135 MMOL/L (ref 135–147)
WBC # BLD AUTO: 9.31 THOUSAND/UL (ref 4.31–10.16)

## 2022-12-14 PROCEDURE — 0TCB8ZZ EXTIRPATION OF MATTER FROM BLADDER, VIA NATURAL OR ARTIFICIAL OPENING ENDOSCOPIC: ICD-10-PCS | Performed by: UROLOGY

## 2022-12-14 PROCEDURE — 0TC68ZZ EXTIRPATION OF MATTER FROM RIGHT URETER, VIA NATURAL OR ARTIFICIAL OPENING ENDOSCOPIC: ICD-10-PCS | Performed by: UROLOGY

## 2022-12-14 PROCEDURE — 0T768DZ DILATION OF RIGHT URETER WITH INTRALUMINAL DEVICE, VIA NATURAL OR ARTIFICIAL OPENING ENDOSCOPIC: ICD-10-PCS | Performed by: UROLOGY

## 2022-12-14 PROCEDURE — 0T5B8ZZ DESTRUCTION OF BLADDER, VIA NATURAL OR ARTIFICIAL OPENING ENDOSCOPIC: ICD-10-PCS | Performed by: UROLOGY

## 2022-12-14 DEVICE — INLAY OPTIMA URETERAL STENT W/O GUIDEWIRE
Type: IMPLANTABLE DEVICE | Site: KIDNEY | Status: FUNCTIONAL
Brand: BARD® INLAY OPTIMA® URETERAL STENT

## 2022-12-14 RX ORDER — CEFEPIME HYDROCHLORIDE 1 G/50ML
INJECTION, SOLUTION INTRAVENOUS AS NEEDED
Status: DISCONTINUED | OUTPATIENT
Start: 2022-12-14 | End: 2022-12-14

## 2022-12-14 RX ORDER — ONDANSETRON 2 MG/ML
4 INJECTION INTRAMUSCULAR; INTRAVENOUS ONCE AS NEEDED
Status: DISCONTINUED | OUTPATIENT
Start: 2022-12-14 | End: 2022-12-14 | Stop reason: HOSPADM

## 2022-12-14 RX ORDER — PROPOFOL 10 MG/ML
INJECTION, EMULSION INTRAVENOUS AS NEEDED
Status: DISCONTINUED | OUTPATIENT
Start: 2022-12-14 | End: 2022-12-14

## 2022-12-14 RX ORDER — FENTANYL CITRATE/PF 50 MCG/ML
25 SYRINGE (ML) INJECTION
Status: COMPLETED | OUTPATIENT
Start: 2022-12-14 | End: 2022-12-14

## 2022-12-14 RX ORDER — FENTANYL CITRATE 50 UG/ML
INJECTION, SOLUTION INTRAMUSCULAR; INTRAVENOUS AS NEEDED
Status: DISCONTINUED | OUTPATIENT
Start: 2022-12-14 | End: 2022-12-14

## 2022-12-14 RX ORDER — LIDOCAINE HYDROCHLORIDE 10 MG/ML
INJECTION, SOLUTION EPIDURAL; INFILTRATION; INTRACAUDAL; PERINEURAL AS NEEDED
Status: DISCONTINUED | OUTPATIENT
Start: 2022-12-14 | End: 2022-12-14

## 2022-12-14 RX ORDER — SODIUM CHLORIDE, SODIUM LACTATE, POTASSIUM CHLORIDE, CALCIUM CHLORIDE 600; 310; 30; 20 MG/100ML; MG/100ML; MG/100ML; MG/100ML
INJECTION, SOLUTION INTRAVENOUS CONTINUOUS PRN
Status: DISCONTINUED | OUTPATIENT
Start: 2022-12-14 | End: 2022-12-14

## 2022-12-14 RX ORDER — ONDANSETRON 2 MG/ML
INJECTION INTRAMUSCULAR; INTRAVENOUS AS NEEDED
Status: DISCONTINUED | OUTPATIENT
Start: 2022-12-14 | End: 2022-12-14

## 2022-12-14 RX ORDER — HYDROMORPHONE HCL/PF 1 MG/ML
0.5 SYRINGE (ML) INJECTION
Status: DISCONTINUED | OUTPATIENT
Start: 2022-12-14 | End: 2022-12-14 | Stop reason: HOSPADM

## 2022-12-14 RX ADMIN — GABAPENTIN 300 MG: 300 CAPSULE ORAL at 21:31

## 2022-12-14 RX ADMIN — METOPROLOL SUCCINATE 100 MG: 100 TABLET, EXTENDED RELEASE ORAL at 12:51

## 2022-12-14 RX ADMIN — CEFEPIME 1000 MG: 1 INJECTION, POWDER, FOR SOLUTION INTRAMUSCULAR; INTRAVENOUS at 16:48

## 2022-12-14 RX ADMIN — ISOSORBIDE MONONITRATE 30 MG: 30 TABLET, EXTENDED RELEASE ORAL at 12:51

## 2022-12-14 RX ADMIN — TAMSULOSIN HYDROCHLORIDE 0.4 MG: 0.4 CAPSULE ORAL at 15:52

## 2022-12-14 RX ADMIN — FENTANYL CITRATE 25 MCG: 50 INJECTION INTRAMUSCULAR; INTRAVENOUS at 10:32

## 2022-12-14 RX ADMIN — Medication 25 MCG: at 11:25

## 2022-12-14 RX ADMIN — FENTANYL CITRATE 25 MCG: 50 INJECTION INTRAMUSCULAR; INTRAVENOUS at 10:39

## 2022-12-14 RX ADMIN — Medication 25 MCG: at 11:40

## 2022-12-14 RX ADMIN — INSULIN GLARGINE 18 UNITS: 100 INJECTION, SOLUTION SUBCUTANEOUS at 21:31

## 2022-12-14 RX ADMIN — FENTANYL CITRATE 25 MCG: 50 INJECTION INTRAMUSCULAR; INTRAVENOUS at 10:25

## 2022-12-14 RX ADMIN — HYDROMORPHONE HYDROCHLORIDE 0.5 MG: 1 INJECTION, SOLUTION INTRAMUSCULAR; INTRAVENOUS; SUBCUTANEOUS at 11:53

## 2022-12-14 RX ADMIN — SENNOSIDES 17.2 MG: 8.6 TABLET, FILM COATED ORAL at 15:52

## 2022-12-14 RX ADMIN — INSULIN LISPRO 5 UNITS: 100 INJECTION, SOLUTION INTRAVENOUS; SUBCUTANEOUS at 15:53

## 2022-12-14 RX ADMIN — ARIPIPRAZOLE 2 MG: 2 TABLET ORAL at 12:52

## 2022-12-14 RX ADMIN — LIDOCAINE HYDROCHLORIDE 50 MG: 10 INJECTION, SOLUTION EPIDURAL; INFILTRATION; INTRACAUDAL; PERINEURAL at 10:25

## 2022-12-14 RX ADMIN — ONDANSETRON 4 MG: 2 INJECTION INTRAMUSCULAR; INTRAVENOUS at 10:25

## 2022-12-14 RX ADMIN — OXYBUTYNIN 10 MG: 10 TABLET, FILM COATED, EXTENDED RELEASE ORAL at 12:51

## 2022-12-14 RX ADMIN — CEFEPIME 1000 MG: 1 INJECTION, POWDER, FOR SOLUTION INTRAMUSCULAR; INTRAVENOUS at 10:30

## 2022-12-14 RX ADMIN — Medication 25 MCG: at 11:35

## 2022-12-14 RX ADMIN — INSULIN LISPRO 3 UNITS: 100 INJECTION, SOLUTION INTRAVENOUS; SUBCUTANEOUS at 15:53

## 2022-12-14 RX ADMIN — HYDROMORPHONE HYDROCHLORIDE 0.5 MG: 1 INJECTION, SOLUTION INTRAMUSCULAR; INTRAVENOUS; SUBCUTANEOUS at 01:37

## 2022-12-14 RX ADMIN — SODIUM CHLORIDE, SODIUM LACTATE, POTASSIUM CHLORIDE, AND CALCIUM CHLORIDE: .6; .31; .03; .02 INJECTION, SOLUTION INTRAVENOUS at 10:17

## 2022-12-14 RX ADMIN — PROPOFOL 130 MG: 10 INJECTION, EMULSION INTRAVENOUS at 10:25

## 2022-12-14 RX ADMIN — FENTANYL CITRATE 25 MCG: 50 INJECTION INTRAMUSCULAR; INTRAVENOUS at 10:45

## 2022-12-14 RX ADMIN — BIMATOPROST 1 DROP: 0.1 SOLUTION/ DROPS OPHTHALMIC at 21:32

## 2022-12-14 RX ADMIN — OXYCODONE HYDROCHLORIDE 10 MG: 10 TABLET ORAL at 18:21

## 2022-12-14 RX ADMIN — INSULIN LISPRO 2 UNITS: 100 INJECTION, SOLUTION INTRAVENOUS; SUBCUTANEOUS at 21:32

## 2022-12-14 RX ADMIN — Medication 25 MCG: at 11:18

## 2022-12-14 RX ADMIN — HYDROMORPHONE HYDROCHLORIDE 0.5 MG: 1 INJECTION, SOLUTION INTRAMUSCULAR; INTRAVENOUS; SUBCUTANEOUS at 21:31

## 2022-12-14 RX ADMIN — EZETIMIBE 10 MG: 10 TABLET ORAL at 12:51

## 2022-12-14 RX ADMIN — BIMATOPROST 1 DROP: 0.1 SOLUTION/ DROPS OPHTHALMIC at 00:43

## 2022-12-14 NOTE — PROGRESS NOTES
[address] [facility]  Progress Note - Karely Margaret 1945, 76 y o  male MRN: [id number]  Unit/Bed#: OR POOL Encounter: [id number]  Primary Care Provider: Jacob Smith MD   Date and time admitted to hospital: 12/12/2022  3:38 PM    * Hematuria  Assessment & Plan  · Acute on chronic  · CT on admission revealed stable bladder wall thickening toward the left compatible with known neoplasm, new right hydronephrosis with high density intraluminal material in the distal right ureter likely representing blood, stable metastatic retroperitoneal lymphadenopathy, and 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  · Hemoglobin 8 5 on admission; 7 0 today  · Urology consult appreciated - plan to go to OR today for cystoscopy, clot evacuation, possible transurethral resection/fulguration of bleeding points within bladder and bilateral retrograde studies  · Monitor H&H, transfuse as needed for Hgb <7  · Patient with bilateral nephrostomy tubes and had Shirley placed in ED    Malignant neoplasm of anterior wall of urinary bladder ([address] )  Assessment & Plan  · Diagnosed in 1994 s/p treatment with BCG, however recurrence, s/p chemoradiation in 2021  · Has bilateral nephrostomy tube  · Patient also noted to be on prednisone 20 mg daily, patient uncertain why  · Urology following     UTI (urinary tract infection)  Assessment & Plan  · PMH of Recurrent UTI on suppressive bactrim at home, presented with UTI  · Was placed on cefepime on admission   Prelim urine culture growing >100,000 gram negative rebeca    CKD (chronic kidney disease)  Assessment & Plan  Lab Results   Component Value Date    EGFR 14 12/14/2022    EGFR 15 12/13/2022    EGFR 15 12/12/2022    CREATININE 3 80 (H) 12/14/2022    CREATININE 3 46 (H) 12/13/2022    CREATININE 3 57 (H) 12/12/2022   · Baseline creatinine this year appears to be low to mid-3s  · Monitor renal function  · Consider Nephro consult     Type 2 diabetes mellitus, with long-term current use of insulin [facility])  Assessment & Plan  Lab Results   Component Value Date    HGBA1C 7 2 (H) 10/20/2022       Recent Labs     12/13/22  1220 12/13/22  1643 12/13/22  2127 12/14/22  0832   POCGLU 168* 136 181* 139       Blood Sugar Average: Last 72 hrs:  (P) 188 4     · Continue home insulin lantus 18 units at bedtime, Humalog 5 units TID   · Sliding scale    Coronary artery disease of native artery of native heart with stable angina pectoris (HCC)  Assessment & Plan  · Continue metoprolol and isosorbide  · Hold aspirin due to hematuria    PAD (peripheral artery disease) ([facility])  Assessment & Plan  · Aspirin on hold as above    Chronic pain syndrome  Assessment & Plan  · Pain control with oxycodone at home  · Pain control       Hypertension with renal disease  Assessment & Plan  · Continue metoprolol, imdur  · Lasix on hold given rise in creatinine   · Avoid hypotension     Moderate major depression, single episode ([facility])  Assessment & Plan  · Continue home meds        VTE Pharmacologic Prophylaxis: VTE Score: 5 High Risk (Score >/= 5) - Pharmacological DVT Prophylaxis Contraindicated  Sequential Compression Devices Ordered  Patient Centered Rounds: I performed bedside rounds with nursing staff today  Chelsi  Discussions with Specialists or Other Care Team Provider:     Education and Discussions with Family / Patient: Updated  (granddaughter, Otto Santiago, per the patient's request) via phone  Otto Santiago is also requesting an update from Urology post-procedure, I made them aware via TT    Time Spent for Care: 30 minutes  More than 50% of total time spent on counseling and coordination of care as described above      Current Length of Stay: 2 day(s)  Current Patient Status: Inpatient   Certification Statement: The patient will continue to require additional inpatient hospital stay due to OR today with Urology, IV abx, anemia  Discharge Plan: Anticipate discharge in 48 hrs to home     Code Status: Level 1 - Full Code    Subjective:   Mr  Clearance Dose denies complaint  Denies CP, SOB, abdominal pain, fever, chills, leg swelling    Objective:     Vitals:   Temp (24hrs), Av 3 °F (36 3 °C), Min:97 °F (36 1 °C), Max:97 9 °F (36 6 °C)    Temp:  [97 °F (36 1 °C)-97 9 °F (36 6 °C)] 97 9 °F (36 6 °C)  HR:  [72-85] 85  Resp:  [16] 16  BP: ()/(47-75) 122/75  SpO2:  [95 %-99 %] 99 %  Body mass index is 26 5 kg/m²  Input and Output Summary (last 24 hours): Intake/Output Summary (Last 24 hours) at 2022 1117  Last data filed at 2022 0837  Gross per 24 hour   Intake 500 ml   Output 1725 ml   Net -1225 ml       Physical Exam:   Physical Exam  Vitals reviewed  Constitutional:       Comments: Patient seen sitting in bed comfortably resting, RN present   Cardiovascular:      Rate and Rhythm: Normal rate and regular rhythm  Pulmonary:      Effort: Pulmonary effort is normal  No respiratory distress  Breath sounds: Normal breath sounds  Abdominal:      General: Bowel sounds are normal       Palpations: Abdomen is soft  Tenderness: There is no abdominal tenderness  Genitourinary:     Comments: Shirley draining red urine  Left nephrostomy with yellow urine  Right nephrostomy with rust colored urine  Musculoskeletal:      Right lower leg: No edema  Left lower leg: No edema  Skin:     General: Skin is warm  Neurological:      Mental Status: He is alert and oriented to person, place, and time     Psychiatric:         Mood and Affect: Mood normal          Behavior: Behavior normal           Additional Data:     Labs:  Results from last 7 days   Lab Units 22  0610 22  0456 22  1358   WBC Thousand/uL 9 31   < > 16 61*   HEMOGLOBIN g/dL 7 0*   < > 8 5*   HEMATOCRIT % 22 1*   < > 26 7*   PLATELETS Thousands/uL 230   < > 247   NEUTROS PCT %  --   --  84*   LYMPHS PCT %  --   --  7*   MONOS PCT %  --   --  7   EOS PCT %  --   --  1    < > = values in this interval not displayed  Results from last 7 days   Lab Units 12/14/22  0610 12/13/22  0456 12/12/22  1358   SODIUM mmol/L 135   < > 131*   POTASSIUM mmol/L 4 0   < > 3 7   CHLORIDE mmol/L 106   < > 102   CO2 mmol/L 23   < > 20*   BUN mg/dL 53*   < > 51*   CREATININE mg/dL 3 80*   < > 3 57*   ANION GAP mmol/L 6   < > 9   CALCIUM mg/dL 9 0   < > 9 4   ALBUMIN g/dL  --   --  2 8*   TOTAL BILIRUBIN mg/dL  --   --  0 56   ALK PHOS U/L  --   --  114   ALT U/L  --   --  21   AST U/L  --   --  19   GLUCOSE RANDOM mg/dL 141*   < > 265*    < > = values in this interval not displayed       Results from last 7 days   Lab Units 12/12/22  1358   INR  1 01     Results from last 7 days   Lab Units 12/14/22  0832 12/13/22  2127 12/13/22  1643 12/13/22  1220 12/13/22  0615   POC GLUCOSE mg/dl 139 181* 136 168* 318*               Lines/Drains:  Invasive Devices     Central Venous Catheter Line  Duration           Port A Cath Right Chest -- days          Peripheral Intravenous Line  Duration           Peripheral IV 12/12/22 Distal;Right;Upper;Ventral (anterior) Arm 1 day    Peripheral IV 12/13/22 Left Antecubital <1 day          Drain  Duration           Nephrostomy Left 10 2 Fr  28 days    Nephrostomy Right 10 2 Fr  28 days    Continuous Bladder Irrigation Three-way <1 day                         Imaging: Reviewed radiology reports from this admission including: abdominal/pelvic CT    Recent Cultures (last 7 days):   Results from last 7 days   Lab Units 12/12/22  1359   URINE CULTURE  >100,000 cfu/ml Gram Negative Jose Manuel*       Last 24 Hours Medication List:   Current Facility-Administered Medications   Medication Dose Route Frequency Provider Last Rate   • [MAR Hold] acetaminophen  650 mg Oral Q6H PRN Sanju Villegas MD     • [MAR Hold] ARIPiprazole  2 mg Oral Daily Sanju Villegas MD     • [MAR Hold] azelastine  1 spray Each Nare BID Sanju Villegas MD     • [MAR Hold] bimatoprost  1 drop Both Eyes HS Sanju Villegas MD     • [MAR Hold] calcitriol  0 25 mcg Oral Daily Amberly Bahena MD     • [MAR Hold] cefepime  1,000 mg Intravenous Q24H Amberly Bahena MD 1,000 mg (12/13/22 1730)   • [MAR Hold] DULoxetine  30 mg Oral Daily Amberly Bahena MD     • [MAR Hold] ezetimibe  10 mg Oral Daily Amberly Bahena MD     • fentaNYL  25 mcg Intravenous Q5 Min PRN Arnulfo Pacheco CRNA     • [facility] Hold] ferrous sulfate  325 mg Oral Daily With Johnny Lindsey MD     • [MAR Hold] fluticasone  1 spray Nasal Daily Amberly Bahena MD     • [MAR Hold] gabapentin  300 mg Oral HS Amberly Bahena MD     • [MAR Hold] HYDROmorphone  0 5 mg Intravenous Q4H PRN Amberly Bahena MD     • [MAR Hold] insulin glargine  18 Units Subcutaneous HS Amberly Bahena MD     • [MAR Hold] insulin lispro  1-5 Units Subcutaneous HS Amberly Bahena MD     • [MAR Hold] insulin lispro  1-6 Units Subcutaneous TID Deandre Santos MD     • [MAR Hold] insulin lispro  5 Units Subcutaneous TID With Charlotte Bowens MD     • [MAR Hold] isosorbide mononitrate  30 mg Oral QAM Amberly Bahena MD     • [MAR Hold] metoprolol succinate  100 mg Oral Daily Amberly Bahena MD     • [MAR Hold] ondansetron  4 mg Intravenous Q6H PRN Amberly Bahena MD     • ondansetron  4 mg Intravenous Once PRN Arnulfo Pacheco CRNA     • [facility] Hold] oxybutynin  10 mg Oral Daily Amberly Bahena MD     • [MAR Hold] oxyCODONE  10 mg Oral Q6H PRN Amberly Bahena MD     • [MAR Hold] oxyCODONE  5 mg Oral Q6H PRN Amberly Bahena MD     • [MAR Hold] pantoprazole  40 mg Oral Early Morning Amberly Bahena MD     • [MAR Hold] polyethylene glycol  17 g Oral Daily Amberly Bahena MD     • [MAR Hold] predniSONE  20 mg Oral Daily Amberly Bahena MD     • [MAR Hold] senna  2 tablet Oral BID Amberly Bahena MD     • [MAR Hold] tamsulosin  0 4 mg Oral Daily With José Luis Robertson MD     • [facility] Hold] zolpidem  5 mg Oral HS PRN Amberly Bahena MD          Today, Patient Was Seen By: Sil Shetty PA-C    **Please Note: This note may have been constructed using a voice recognition system  **

## 2022-12-14 NOTE — ASSESSMENT & PLAN NOTE
· Acute on chronic  · CT on admission revealed stable bladder wall thickening toward the left compatible with known neoplasm, new right hydronephrosis with high density intraluminal material in the distal right ureter likely representing blood, stable metastatic retroperitoneal lymphadenopathy, and 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  · Hemoglobin 8 5 on admission; 7 0 today  · Urology consult appreciated - plan to go to OR today for cystoscopy, clot evacuation, possible transurethral resection/fulguration of bleeding points within bladder and bilateral retrograde studies  · Monitor H&H, transfuse as needed for Hgb <7  · Patient with bilateral nephrostomy tubes and had Shirley placed in ED

## 2022-12-14 NOTE — PLAN OF CARE
Problem: Nutrition/Hydration-ADULT  Goal: Nutrient/Hydration intake appropriate for improving, restoring or maintaining nutritional needs  Description: Monitor and assess patient's nutrition/hydration status for malnutrition  Collaborate with interdisciplinary team and initiate plan and interventions as ordered  Monitor patient's weight and dietary intake as ordered or per policy  Utilize nutrition screening tool and intervene as necessary  Determine patient's food preferences and provide high-protein, high-caloric foods as appropriate       INTERVENTIONS:  - Monitor oral intake, urinary output, labs, and treatment plans  - Assess nutrition and hydration status and recommend course of action  - Evaluate amount of meals eaten  - Assist patient with eating if necessary   - Allow adequate time for meals  - Recommend/ encourage appropriate diets, oral nutritional supplements, and vitamin/mineral supplements  - Order, calculate, and assess calorie counts as needed  - Recommend, monitor, and adjust tube feedings and TPN/PPN based on assessed needs  - Assess need for intravenous fluids  - Provide specific nutrition/hydration education as appropriate  - Include patient/family/caregiver in decisions related to nutrition  Outcome: Progressing     Problem: MOBILITY - ADULT  Goal: Maintain or return to baseline ADL function  Description: INTERVENTIONS:  -  Assess patient's ability to carry out ADLs; assess patient's baseline for ADL function and identify physical deficits which impact ability to perform ADLs (bathing, care of mouth/teeth, toileting, grooming, dressing, etc )  - Assess/evaluate cause of self-care deficits   - Assess range of motion  - Assess patient's mobility; develop plan if impaired  - Assess patient's need for assistive devices and provide as appropriate  - Encourage maximum independence but intervene and supervise when necessary  - Involve family in performance of ADLs  - Assess for home care needs following discharge   - Consider OT consult to assist with ADL evaluation and planning for discharge  - Provide patient education as appropriate  Outcome: Progressing  Goal: Maintains/Returns to pre admission functional level  Description: INTERVENTIONS:  - Perform BMAT or MOVE assessment daily    - Set and communicate daily mobility goal to care team and patient/family/caregiver     - Collaborate with rehabilitation services on mobility goals if consulted  - Out of bed for toileting  - Record patient progress and toleration of activity level   Outcome: Progressing

## 2022-12-14 NOTE — CASE MANAGEMENT
Case Management Assessment & Discharge Planning Note    Patient name Hien Albrecht  Location [facility] 917/[facility] 921-42 MRN [id number]  : 1945 Date 2022       Current Admission Date: 2022  Current Admission Diagnosis:Hematuria   Patient Active Problem List    Diagnosis Date Noted   • Recurrent left pleural effusion 2022   • UTI (urinary tract infection) 2022   • Shortness of breath 2022   • History of tobacco use disorder 2022   • Retroperitoneal lymphadenopathy 2022   • Pulmonary nodules 2022   • Syncope and collapse 2022   • Depression with suicidal ideation 2022   • Cancer related pain 2022   • Bilateral hydronephrosis 04/10/2022   • CKD (chronic kidney disease) 2022   • Fall 2022   • Hyperkalemia 2022   • Retained ureteral stent    • 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[facility] ) 2022   • Embolism and 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 ([facility] ) 2022   • Abnormal CT scan, bladder 2022   • Port-A-Cath in place 2022   • Continuous opioid dependence ([address] ) 2021   • Hematuria 10/24/2021   • Acute urinary retention 10/21/2021   • Chronic pain syndrome 2021   • Lumbar spondylosis    • Chronic bronchitis ([address] ) 2021   • Moderate major depression, single episode ([address] ) 2021   • Thrombocytopenia ([address] ) 2021   • 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 2021   • Gross hematuria 2021   • PAD (peripheral artery disease) ([facility])    • Left carotid bruit    • Anemia of chronic disease 2020   • Preoperative clearance 2020   • Chronic anemia 10/10/2020   • Diabetic ulcer of left foot associated with type 2 diabetes mellitus, 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 ([address] ) 10/08/2020   • Acute kidney injury superimposed on CKD [facility])    • Dysuria 2020   • Diabetic polyneuropathy associated with type 2 diabetes mellitus ([facility] ) 2020   • Abnormal MRI, lumbar spine 06/29/2020   • 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)    • Acute renal failure (ARF) ([facility] ) 02/12/2020   • Secondary renal hyperparathyroidism ([facility] ) 02/12/2020   • Preop examination 04/16/2019   • Superficial phlebitis 03/07/2019   • Arteriosclerosis of artery of extremity ([facility] ) 02/22/2019   • Stable angina pectoris ([facility] ) 02/22/2019   • Herpes zoster without complication 31/26/7376   • Localized edema 02/22/2019   • Back pain 01/31/2019   • Degeneration of lumbar intervertebral disc 12/03/2018   • Primary osteoarthritis of left knee 03/16/2018   • Bladder carcinoma ([facility] ) 08/16/2016   • Presence of stent in coronary artery 08/16/2016   • Hypertension with renal disease 10/29/2015   • Hyperlipidemia 10/29/2015   • Cystitis due to intravesical BCG administration 09/24/2015   • Coronary artery disease of native artery of native heart with stable angina pectoris ([facility] ) 05/19/2011   • Type 2 diabetes mellitus, with long-term current use of insulin ([facility] ) 01/09/2004      LOS (days): 2  Geometric Mean LOS (GMLOS) (days): 2 80  Days to GMLOS:0 9     OBJECTIVE:  PATIENT READMITTED TO HOSPITAL  Risk of Unplanned Readmission Score: 54 78         Current admission status: Inpatient       Preferred Pharmacy:   [facility] [address] [place] [address]  Phone: [phone] Fax: [phone]    Primary Care Provider: Mesfin Freedman MD    Primary Insurance: MEDICARE  Secondary Insurance: BLUE CROSS    ASSESSMENT:   [facility], [address] Representative - Spouse   Primary Phone: [phone] (Mobile)  Home Phone: [phone]                              Patient Information  Admitted from[de-identified] Home  Mental Status: Alert  During Assessment patient was accompanied by: Not accompanied during assessment  Assessment information provided by[de-identified] Patient  Primary Caregiver: Self  Support Systems: Self, Spouse/significant other  What city do you live in[place]  entry access options   Select all that apply : Stairs  Number of steps to enter home : 9  Do the steps have railings?: Yes  Type of Current Residence: 2 story home  Upon entering residence, is there a bedroom on the main floor (no further steps)?: No  A bedroom is located on the following floor levels of residence (select all that apply):: 2nd Floor  Upon entering residence, is there a bathroom on the main floor (no further steps)?: Yes  Number of steps to 2nd floor from main floor: One Flight  In the last 12 months, was there a time when you were not able to pay the mortgage or rent on time?: No  In the last 12 months, how many places have you lived?: 1  In the last 12 months, was there a time when you did not have a steady place to sleep or slept in a shelter (including now)?: No  Living Arrangements: Lives w/ Spouse/significant other    Activities of Daily Living Prior to Admission  Functional Status: Independent  Completes ADLs independently?: Yes  Ambulates independently?: Yes  Does patient use assisted devices?: Yes  Assisted Devices (DME) used: Rollator  Does patient currently own DME?: Yes  What DME does the patient currently own?: Felipe Nesbitt, Bedside [place], Rollator (stair climber)  Does patient have a history of Outpatient Therapy (PT/OT)?: No  Does the patient have a history of Short-Term Rehab?: No  Does patient have a history of HHC?: No  Does patient currently have [facility]?: No         Patient Information Continued  Income Source: Pension/detention  Does patient have prescription coverage?: Yes  Within the past 12 months, you worried that your food would run out before you got the money to buy more : Never true  Within the past 12 months, the food you bought just didn't last and you didn't have money to get more : Never true  Does patient receive dialysis treatments?: No  Does patient have a history of substance abuse?: No  Does patient have a history of Mental Health Diagnosis?: No         Means of Transportation  Means of Transport to Appts[de-identified] Family transport  In the past 12 months, has lack of transportation kept you from medical appointments or from getting medications?: No  In the past 12 months, has lack of transportation kept you from meetings, work, or from getting things needed for daily living?: No        DISCHARGE DETAILS:    Discharge planning discussed with[de-identified] Patient  Freedom of Choice: Yes        Were Treatment Team discharge recommendations reviewed with patient/caregiver?: Yes  Did patient/caregiver verbalize understanding of patient care needs?: Yes  Were patient/caregiver advised of the risks associated with not following Treatment Team discharge recommendations?: Yes                   Other Referral/Resources/Interventions Provided:  Referral Comments: awaiting recommendations                   Additional Comments: patient confirms that he has received a total of 3 COVID vaccines  States that they were all Silva Peter

## 2022-12-14 NOTE — PROGRESS NOTES
Progress Note - Urology  Ajay Pearson 1945, 76 y o  male MRN: [id number]    Unit/Bed#: OR POOL Encounter: [id number]      Assessment and plan  BCG refractory recurrent high-grade bladder cancer status post chemoradiation 2021, and current immunotherapy  Bilateral distal ureteral obstruction due to tumor is managed with bilateral percutaneous nephrostomy tubes  These are draining more clearly today  He continues to have gross hematuria per urethra, refractory to manual irrigations  He is planned for the OR this morning for cystoscopy, clot evacuation, retrograde pyelogram, and possible ureteroscopy right side  He has received iv cefepime this admission for known recurrent GNR bacteriuria, typically e coli for him  By bedside examination is 68-year-old black male awake and alert  Experiencing lower pelvic and bladder spasm after recent manual irrigation moments ago  Right nephrostomy tube is draining clear light red urine, left nephrostomy tube draining clear urine light yellow urine  Shirley catheter per urethra is draining clear cherry red urine  He is afebrile  No edema or dyspnea noted  He is ready for OR, they are transporting him now  Written consent obtained yesterday, again verbally confirmed  Subjective: severe bladder spasm after irrigation moments ago    Review of Systems   Constitutional: Negative for activity change, appetite change, chills, fever and unexpected weight change  HENT: Negative  Respiratory: Negative  Negative for shortness of breath  Cardiovascular: Negative  Negative for chest pain  Gastrointestinal: Negative for abdominal pain, diarrhea, nausea and vomiting  Endocrine: Negative  Genitourinary: Positive for hematuria  Negative for decreased urine volume, difficulty urinating, dysuria, flank pain, frequency and urgency  Musculoskeletal: Positive for back pain  Negative for gait problem  Skin: Negative  Allergic/Immunologic: Negative  Neurological: Negative  Hematological: Negative for adenopathy  Does not bruise/bleed easily  Objective:  Vitals: Blood pressure 122/75, pulse 85, temperature 97 9 °F (36 6 °C), resp  rate 16, height 6' 3" (1 905 m), weight 96 2 kg (212 lb), SpO2 99 %  ,Body mass index is 26 5 kg/m²  Intake/Output Summary (Last 24 hours) at 12/14/2022 1128  Last data filed at 12/14/2022 7899  Gross per 24 hour   Intake 500 ml   Output 1725 ml   Net -1225 ml     Invasive Devices     Central Venous Catheter Line  Duration           Port A Cath Right Chest -- days          Peripheral Intravenous Line  Duration           Peripheral IV 12/12/22 Distal;Right;Upper;Ventral (anterior) Arm 1 day    Peripheral IV 12/13/22 Left Antecubital <1 day          Drain  Duration           Nephrostomy Left 10 2 Fr  28 days    Nephrostomy Right 10 2 Fr  28 days    Continuous Bladder Irrigation Three-way <1 day                Physical Exam  Vitals and nursing note reviewed  Constitutional:       General: He is not in acute distress  Appearance: He is well-developed  He is not diaphoretic  HENT:      Head: Normocephalic and atraumatic  Cardiovascular:      Rate and Rhythm: Normal rate and regular rhythm  Heart sounds: Normal heart sounds  No murmur heard  Pulmonary:      Effort: Pulmonary effort is normal       Breath sounds: Normal breath sounds  Abdominal:      General: Bowel sounds are normal       Palpations: Abdomen is soft  Comments: B/l PCNs right clear red left clear yellow   Genitourinary:     Comments: Shirley per urethra clear cherry red urine  Musculoskeletal:         General: Normal range of motion  Skin:     General: Skin is warm and dry  Capillary Refill: Capillary refill takes less than 2 seconds  Coloration: Skin is not pale  Neurological:      Mental Status: He is alert and oriented to person, place, and time        Gait: Gait normal    Psychiatric:         Speech: Speech normal  Behavior: Behavior normal          Labs:  Recent Labs     12/12/22  1358 12/13/22  0456 12/14/22  0610   WBC 16 61* 12 95* 9 31     Recent Labs     12/12/22  1358 12/13/22  0456 12/14/22  0610   HGB 8 5* 7 9* 7 0*       Recent Labs     12/12/22  1358 12/13/22  0456 12/14/22  0610   CREATININE 3 57* 3 46* 3 80*       History:    Past Medical History:   Diagnosis Date   • Anemia     Last assessed: 9/28/17   • Anxiety    • Arteriosclerotic cardiovascular disease     Last assessed: 9/28/17   • Arthritis    • Bladder cancer ([facility] )     bladder- had BCG treatments   • Chronic kidney disease     Stage IV   • CKD (chronic kidney disease) stage 4, GFR 15-29 ml/min ([facility])    • Colon polyp    • Coronary artery disease     7 stents   • Depression    • Diabetes mellitus ([facility])     IDDM   • GERD (gastroesophageal reflux disease)    • Glaucoma    • Hematuria    • History of fusion of cervical spine    • Hyperlipidemia    • Hypertension    • Insomnia     Last assessed: 11/14/12   • Loss of hearing     has hearing aids but usually does not wear them   • Lung cancer ([facility] )    • Metastatic cancer ([facility] )    • Other seasonal allergic rhinitis     Last assessed: 2/10/16   • PAD (peripheral artery disease) ([facility])    • Shortness of breath     on exertion   • Spinal stenosis of lumbar region    • Transient cerebral ischemia     No Residual   • Uses walker     w/c for longer distances     Past Surgical History:   Procedure Laterality Date   • CARDIAC SURGERY      Cath stent placement  Last assessed: 3/9/17  Interventional Catheterization   • CHOLECYSTECTOMY     • COLONOSCOPY     • CYSTOSCOPY      Diagnostic w/biopsy  Rosy Riggs  Last assessed: 12/1/14   • CYSTOSCOPY N/A 04/12/2022    Procedure: CYSTOSCOPY  Bladder biopsies  ;  Surgeon: Tati Chavez MD;  [facility];  Service: Urology   • CYSTOSCOPY W/ RETROGRADES Right 03/01/2022    Procedure: CYSTO; stent removal retrograde;  Surgeon: Tati Chavez MD;  [facility]; Service: Urology   • CYSTOSCOPY W/ URETERAL STENT PLACEMENT Bilateral 10/18/2021    Procedure: bilateral retrogrades, cytology collection;  Surgeon: Nesha Park MD;  [facility];  Service: Urology   • CYSTOURETHROSCOPY      w/cautery  Mirela Guadarrama   • FL RETROGRADE PYELOGRAM  10/18/2021   • FL RETROGRADE PYELOGRAM  10/24/2021   • GASTRIC BYPASS      For morbid obesity w/Shaji-en-Y   Resolved: 11/17/09   • INCISION AND DRAINAGE OF WOUND Right 02/26/2017    Procedure: INCISION AND DRAINAGE (I&D) EXTREMITY WITH APPLICATION OF GRAFT JACKET;  Surgeon: Farooq Sears DPM;  [facility];  Service:    • INCISION AND DRAINAGE OF WOUND Right 04/25/2017    Procedure: INCISION AND DRAINAGE (I&D) EXTREMITY, APPLICATION OF GRAFT;  Surgeon: Farooq Sears DPM;  [facility];  Service:    • IR BIOPSY OTHER  07/02/2020   • IR LOWER EXTREMITY ANGIOGRAM  02/08/2021   • IR LOWER EXTREMITY ANGIOGRAM  02/11/2021   • IR NEPHROSTOMY TUBE CHECK/CHANGE/REPOSITION/REINSERTION/UPSIZE  04/28/2022   • IR NEPHROSTOMY TUBE CHECK/CHANGE/REPOSITION/REINSERTION/UPSIZE  05/24/2022   • IR NEPHROSTOMY TUBE CHECK/CHANGE/REPOSITION/REINSERTION/UPSIZE  06/07/2022   • IR NEPHROSTOMY TUBE CHECK/CHANGE/REPOSITION/REINSERTION/UPSIZE  07/28/2022   • IR NEPHROSTOMY TUBE CHECK/CHANGE/REPOSITION/REINSERTION/UPSIZE  11/15/2022   • IR NEPHROSTOMY TUBE PLACEMENT  02/25/2022   • IR PORT PLACEMENT  01/17/2022   • IR THORACENTESIS  09/02/2022   • IR THORACENTESIS  09/14/2022   • IR THORACENTESIS WITH TUBE PLACEMENT Left     october   • IR TUNNELED CENTRAL LINE PLACEMENT  12/24/2020   • JOINT REPLACEMENT      christofer knees replaced   • KS AMPUTATION METATARSAL+TOE,SINGLE Left 12/21/2020    Procedure: RAY RESECTION FOOT;  Surgeon: Sanjuana Kim DPM;  [facility];  Service: Podiatry   • KS AMPUTATION METATARSAL+TOE,SINGLE Left 12/31/2020    Procedure: 5TH MET RESECTION;  Surgeon: Sanjuana Kim DPM;  [facility]; Service: Podiatry   • TX CYSTOURETHROSCOPY W/IRRIG & EVAC CLOTS N/A 02/10/2021    Procedure: CYSTOSCOPY EVACUATION OF CLOTS, fulguration;  Surgeon: Isaias Foster MD;  [facility];  Service: Urology   • TX CYSTOURETHROSCOPY W/IRRIG & EVAC CLOTS N/A 10/24/2021    Procedure: CYSTOSCOPY EVACUATION OF CLOT, fulguration of bleeding vessels, right ureter stent placement, retrograde pyelogram;  Surgeon: Marco Olsen MD;  [facility];  Service: Urology   • TX CYSTOURETHROSCOPY,BIOPSY N/A 08/16/2016    Procedure: Mortimer Polalbert;  Surgeon: Tereso Singh MD;  [facility];  Service: Urology   • TX CYSTOURETHROSCOPY,FULGUR <0 5 CM LESN N/A 11/19/2020    Procedure: CYSTO W/BIOPSIES, transurethral prostate bx;  Surgeon: Darian Buitrago MD;  [facility];  Service: Urology   • TX CYSTOURETHROSCOPY,FULGUR >5 CM LESN Bilateral 10/18/2021    Procedure: TRANSURETHRAL RESECTION OF BLADDER TUMOR (TURBT);   Surgeon: Atif Camilo MD;  [facility];  Service: Urology   • TX Andria Reich 3RD+ ORD [address] [facility] Left 02/08/2021    Procedure: LEG angiogram, CO2 w/limited contrast with balloon angioplasty postertior tibial artery;  Surgeon: August Weber MD;  [facility];  Service: Vascular   • ROTATOR CUFF REPAIR     • SMALL INTESTINE SURGERY      Surgery Shaji-en-Y   • SPINAL FUSION      lumbar and cervical fusions   • VAC DRESSING APPLICATION Right 22/13/4371    Procedure: APPLICATION VAC DRESSING;  Surgeon: Kayleigh Lozano DPM;  [facility];  Service:    • WOUND DEBRIDEMENT Left 02/16/2021    Procedure: FOOT DEBRIDE, [address] OUT w/graft application;  Surgeon: Kayleigh Lozano DPM;  [facility];  Service: Podiatry     Family History   Problem Relation Age of Onset   • Diabetes Mother    • Heart disease Mother    • Other Mother         High blood pressure   • Heart disease Father    • Diabetes Sister    • Other Sister         High blood pressure • Kidney disease Sister    • Heart disease Brother    • Other Brother         High blood pressure     Social History     Socioeconomic History   • Marital status: /Civil Union     Spouse name: None   • Number of children: None   • Years of education: None   • Highest education level: None   Occupational History   • None   Tobacco Use   • Smoking status: Former     Packs/day: 3 00     Years: 27 00     Pack years: 81 00     Types: Cigarettes     Quit date: 1970     Years since quittin 9   • Smokeless tobacco: Never   Vaping Use   • Vaping Use: Never used   Substance and Sexual Activity   • Alcohol use: Never     Comment: beer / liquor   • Drug use: Not Currently     Types: Marijuana     Comment: quit 2019 had medical marijuana   • Sexual activity: Not Currently   Other Topics Concern   • None   Social History Narrative    Consumes 1 cup of coffee and 1 soda per day     Social Determinants of Health     Financial Resource Strain: Not on file   Food Insecurity: No Food Insecurity   • Worried About Running Out of Food in the Last Year: Never true   • Ran Out of Food in the Last Year: Never true   Transportation Needs: No Transportation Needs   • Lack of Transportation (Medical): No   • Lack of Transportation (Non-Medical):  No   Physical Activity: Not on file   Stress: Not on file   Social Connections: Not on file   Intimate Partner Violence: Not on file   Housing Stability: Unknown   • Unable to Pay for Housing in the Last Year: No   • Number of Places Lived in the Last Year: 1   • Unstable Housing in the Last Year: Not on file         Regina Aragon  Date: 2022 Time: 11:28 AM

## 2022-12-14 NOTE — OP NOTE
Operative Note     PATIENT:  Marta Gibson (MRN [id number])    DATE OF PROCEDURE:   12/14/2022    PRE-OP DIAGNOSES:   1) metastatic urothelial carcinoma of the bladder  #2 gross hematuria  3  Acute blood loss anemia  4  Bilateral ureteral obstruction status post nephrostomy tube placement     POST-OP DIAGNOSES AND OPERATIVE FINDINGS:   1) metastatic urothelial carcinoma of the bladder  #2 gross hematuria  3  Acute blood loss anemia  4  Bilateral ureteral obstruction status post nephrostomy tube placement      PROCEDURES:  1) cystoscopy with evacuation of clots  2  Right retrograde pyelogram  3 right ureteral stent placement  4  Fulguration of bleeding points within bladder    SURGEON:   Wojciech Castro MD    ANESTHESIA TYPE:  General anesthesia    ESTIMATED BLOOD LOSS:   Minimal    COMPLICATIONS:   None    ANTIBIOTICS:  Cefazolin    INTRAOPERATIVE THROMBOEMBOLISM PROPHYLAXIS:  Pneumatic compression stockings    FINDINGS:  A small clot burden is present in the bladder and is completely evacuated  Following this the bladder was carefully inspected with both a 30 degree and 70 degree lens  Of note there is mild active hematuria noted from the right ureteral orifice and clots present within the distal ureter  Retrograde pyelogram did not demonstrate any proximal filling defects  I elected to stent stent to ideally provide tamponade and address his ongoing upper tract bleeding  Fulguration was performed from  a number of friable appearing bleeding points within the bladder as well  PROCEDURE SUMMARY:    The patient was brought to the operating room and anesthesia obtained  The patient was then placed in the lithotomy position and prepped and draped using standard sterile technique  All pressure points were carefully padded  A surgical time-out occurred, antibiotics were administered, and thromboembolism prophylaxis was given    A 27 F saline resectoscope was inserted into the urethra after dilation of the urethral meatus to 28 F using standard urethral sounds  The urethra and bladder were carefully inspected  Cystoscopy findings:    Urethra:  Normal  Prostate:  Normal  Bladder:  Lesion identified, characteristics below  Ureteral orifices: Extruding clot and ongoing mild hematuria is visualized in the right ureteral orifice  The left ureteral orifice is not well visualized   Urachus:   Normal    There are no discrete upper tract lesions  Filling defects present in the distal ureter appear to be consistent with clots  There are no filling defects whatsoever more proximally or in the renal pelvis  I removed some clots in the right ureteral orifice  Again minor but active hematuria is noted  Retrograde pyelogram was performed with the findings as described above  At this point I elected to place a 7 [employer] Karishma ureteral stent to ideally provide tamponade to mitigate his ongoing hematuria  This was placed in the standard fashion  I then fulgurated a number of active bleeding points noted by the dome and right lateral wall consistent with the patient's underlying urothelial cancer  No biopsies were obtained as I did not feel that it would affect his oncologic management  At the conclusion of the case the bladder is observed with multiple cycles of filling and emptying and hemostasis is excellent  DISPOSITION:   PACU - hemodynamically stable  PLAN:    Patient will return to medical paredes  We will wean the CBI as tolerated  We will plan to maintain the patient's nephrostomy tubes and right ureteral stent  Urology will continue to follow along

## 2022-12-14 NOTE — TELEPHONE ENCOUNTER
This is an established patient with of Dr Ad Chowdhury with metastatic bladder cancer, bilateral nephrostomy tubes being treated with ongoing immunotherapy and prior radiation  Patient is now status post right ureteral stent performed as an inpatient for hematuria from the right ureter    I recommend maintaining his ureteral stents and exchanging every 3 months  Please schedule the patient for a right ureteral stent exchange possible fulguration/TURBT in 3 months with Dr Ad Chowdhuyr  I do recommend medical clearance and a preoperative office visit with advanced practitioner or Dr Ad Chowdhury prior to proceeding to the operating room  CC NOLA Hahn for stent tracking      Thank you

## 2022-12-14 NOTE — ASSESSMENT & PLAN NOTE
Lab Results   Component Value Date    EGFR 14 12/14/2022    EGFR 15 12/13/2022    EGFR 15 12/12/2022    CREATININE 3 80 (H) 12/14/2022    CREATININE 3 46 (H) 12/13/2022    CREATININE 3 57 (H) 12/12/2022   · Baseline creatinine this year appears to be low to mid-3s  · Monitor renal function  · Consider Nephro consult

## 2022-12-14 NOTE — ASSESSMENT & PLAN NOTE
Lab Results   Component Value Date    HGBA1C 7 2 (H) 10/20/2022       Recent Labs     12/13/22  1220 12/13/22  1643 12/13/22  2127 12/14/22  0832   POCGLU 168* 136 181* 139       Blood Sugar Average: Last 72 hrs:  (P) 188 4     · Continue home insulin lantus 18 units at bedtime, Humalog 5 units TID   · Sliding scale

## 2022-12-14 NOTE — ASSESSMENT & PLAN NOTE
· Diagnosed in 1994 s/p treatment with BCG, however recurrence, s/p chemoradiation in 2021  · Has bilateral nephrostomy tube  · Patient prescribed prednisone 20 mg daily - patient's granddaughter reports that he was prescribed this for his pleural effusion but was not taking  Patient refused prednisone yesterday  Discontinued order     · Urology following

## 2022-12-14 NOTE — ASSESSMENT & PLAN NOTE
· PMH of Recurrent UTI on suppressive bactrim at home, presented with UTI  · Was placed on cefepime on admission   Prelim urine culture growing >100,000 gram negative rebeca

## 2022-12-14 NOTE — ANESTHESIA POSTPROCEDURE EVALUATION
Post-Op Assessment Note    CV Status:  Stable  Pain Score: 2    Pain management: adequate     Mental Status:  Awake   Hydration Status:  Stable   PONV Controlled:  None   Airway Patency:  Patent      Post Op Vitals Reviewed: Yes      Staff: CRNA         No notable events documented      /75 (12/14/22 1107)    Temp 97 9 °F (36 6 °C) (12/14/22 1107)    Pulse 85 (12/14/22 1107)   Resp 16 (12/14/22 1107)    SpO2   99

## 2022-12-15 ENCOUNTER — HOME HEALTH ADMISSION (OUTPATIENT)
Dept: HOME HEALTH SERVICES | Facility: HOME HEALTHCARE | Age: 77
End: 2022-12-15

## 2022-12-15 ENCOUNTER — APPOINTMENT (OUTPATIENT)
Dept: PHYSICAL THERAPY | Facility: REHABILITATION | Age: 77
End: 2022-12-15

## 2022-12-15 LAB
ANION GAP SERPL CALCULATED.3IONS-SCNC: 7 MMOL/L (ref 4–13)
BUN SERPL-MCNC: 49 MG/DL (ref 5–25)
CALCIUM SERPL-MCNC: 9 MG/DL (ref 8.3–10.1)
CHLORIDE SERPL-SCNC: 106 MMOL/L (ref 96–108)
CO2 SERPL-SCNC: 24 MMOL/L (ref 21–32)
CREAT SERPL-MCNC: 3.48 MG/DL (ref 0.6–1.3)
ERYTHROCYTE [DISTWIDTH] IN BLOOD BY AUTOMATED COUNT: 17.3 % (ref 11.6–15.1)
GFR SERPL CREATININE-BSD FRML MDRD: 15 ML/MIN/1.73SQ M
GLUCOSE SERPL-MCNC: 134 MG/DL (ref 65–140)
GLUCOSE SERPL-MCNC: 137 MG/DL (ref 65–140)
GLUCOSE SERPL-MCNC: 160 MG/DL (ref 65–140)
GLUCOSE SERPL-MCNC: 164 MG/DL (ref 65–140)
GLUCOSE SERPL-MCNC: 223 MG/DL (ref 65–140)
HCT VFR BLD AUTO: 24 % (ref 36.5–49.3)
HGB BLD-MCNC: 7.7 G/DL (ref 12–17)
MCH RBC QN AUTO: 30.8 PG (ref 26.8–34.3)
MCHC RBC AUTO-ENTMCNC: 32.1 G/DL (ref 31.4–37.4)
MCV RBC AUTO: 96 FL (ref 82–98)
PLATELET # BLD AUTO: 237 THOUSANDS/UL (ref 149–390)
PMV BLD AUTO: 10.2 FL (ref 8.9–12.7)
POTASSIUM SERPL-SCNC: 3.9 MMOL/L (ref 3.5–5.3)
RBC # BLD AUTO: 2.5 MILLION/UL (ref 3.88–5.62)
SODIUM SERPL-SCNC: 137 MMOL/L (ref 135–147)
WBC # BLD AUTO: 11.67 THOUSAND/UL (ref 4.31–10.16)

## 2022-12-15 RX ORDER — AMOXICILLIN 500 MG/1
500 CAPSULE ORAL EVERY 12 HOURS SCHEDULED
Status: DISCONTINUED | OUTPATIENT
Start: 2022-12-15 | End: 2022-12-15

## 2022-12-15 RX ADMIN — BIMATOPROST 1 DROP: 0.1 SOLUTION/ DROPS OPHTHALMIC at 22:24

## 2022-12-15 RX ADMIN — INSULIN GLARGINE 18 UNITS: 100 INJECTION, SOLUTION SUBCUTANEOUS at 22:24

## 2022-12-15 RX ADMIN — INSULIN LISPRO 2 UNITS: 100 INJECTION, SOLUTION INTRAVENOUS; SUBCUTANEOUS at 17:46

## 2022-12-15 RX ADMIN — POLYETHYLENE GLYCOL 3350 17 G: 17 POWDER, FOR SOLUTION ORAL at 08:28

## 2022-12-15 RX ADMIN — INSULIN LISPRO 5 UNITS: 100 INJECTION, SOLUTION INTRAVENOUS; SUBCUTANEOUS at 08:35

## 2022-12-15 RX ADMIN — INSULIN LISPRO 5 UNITS: 100 INJECTION, SOLUTION INTRAVENOUS; SUBCUTANEOUS at 12:26

## 2022-12-15 RX ADMIN — OXYBUTYNIN 10 MG: 10 TABLET, FILM COATED, EXTENDED RELEASE ORAL at 08:27

## 2022-12-15 RX ADMIN — FERROUS SULFATE TAB 325 MG (65 MG ELEMENTAL FE) 325 MG: 325 (65 FE) TAB at 05:55

## 2022-12-15 RX ADMIN — TAMSULOSIN HYDROCHLORIDE 0.4 MG: 0.4 CAPSULE ORAL at 17:44

## 2022-12-15 RX ADMIN — ARIPIPRAZOLE 2 MG: 2 TABLET ORAL at 08:31

## 2022-12-15 RX ADMIN — INSULIN LISPRO 5 UNITS: 100 INJECTION, SOLUTION INTRAVENOUS; SUBCUTANEOUS at 17:47

## 2022-12-15 RX ADMIN — CALCITRIOL CAPSULES 0.25 MCG 0.25 MCG: 0.25 CAPSULE ORAL at 08:27

## 2022-12-15 RX ADMIN — ISOSORBIDE MONONITRATE 30 MG: 30 TABLET, EXTENDED RELEASE ORAL at 08:27

## 2022-12-15 RX ADMIN — OXYCODONE HYDROCHLORIDE 10 MG: 10 TABLET ORAL at 18:18

## 2022-12-15 RX ADMIN — OXYCODONE HYDROCHLORIDE 10 MG: 10 TABLET ORAL at 12:25

## 2022-12-15 RX ADMIN — EZETIMIBE 10 MG: 10 TABLET ORAL at 08:27

## 2022-12-15 RX ADMIN — METOPROLOL SUCCINATE 100 MG: 100 TABLET, EXTENDED RELEASE ORAL at 08:27

## 2022-12-15 RX ADMIN — INSULIN LISPRO 1 UNITS: 100 INJECTION, SOLUTION INTRAVENOUS; SUBCUTANEOUS at 22:24

## 2022-12-15 RX ADMIN — HYDROMORPHONE HYDROCHLORIDE 0.5 MG: 1 INJECTION, SOLUTION INTRAMUSCULAR; INTRAVENOUS; SUBCUTANEOUS at 22:27

## 2022-12-15 RX ADMIN — ZOLPIDEM TARTRATE 5 MG: 5 TABLET, COATED ORAL at 22:27

## 2022-12-15 RX ADMIN — CEFTRIAXONE 1000 MG: 1 INJECTION, POWDER, FOR SOLUTION INTRAMUSCULAR; INTRAVENOUS at 17:49

## 2022-12-15 RX ADMIN — DULOXETINE 30 MG: 30 CAPSULE, DELAYED RELEASE ORAL at 08:27

## 2022-12-15 RX ADMIN — SENNOSIDES 17.2 MG: 8.6 TABLET, FILM COATED ORAL at 17:44

## 2022-12-15 RX ADMIN — SENNOSIDES 17.2 MG: 8.6 TABLET, FILM COATED ORAL at 08:27

## 2022-12-15 RX ADMIN — PANTOPRAZOLE SODIUM 40 MG: 40 TABLET, DELAYED RELEASE ORAL at 05:55

## 2022-12-15 RX ADMIN — GABAPENTIN 300 MG: 300 CAPSULE ORAL at 22:27

## 2022-12-15 NOTE — PROGRESS NOTES
UROLOGY PROGRESS NOTE   Patient Identifiers: Robbie Ambrose (MRN [id number])  Date of Service: 12/15/2022    Subjective:     22 HR EVENTS:   no significant events  Patient has  no complaints  Objective:     VITALS:    Vitals:    12/15/22 0718   BP: 127/68   Pulse: 84   Resp:    Temp: 97 5 °F (36 4 °C)   SpO2: 93%       INS & OUTS:  I/O last 24 hours:   In: 30 [I V :30]  Out: 375 [Urine:375]    LABS:  Lab Results   Component Value Date    HGB 7 7 (L) 12/15/2022    HCT 24 0 (L) 12/15/2022    WBC 11 67 (H) 12/15/2022     12/15/2022   ]    Lab Results   Component Value Date     09/03/2015    K 3 9 12/15/2022     12/15/2022    CO2 24 12/15/2022    BUN 49 (H) 12/15/2022    CREATININE 3 48 (H) 12/15/2022    CALCIUM 9 0 12/15/2022    GLUCOSE 203 (H) 09/03/2015   ]    INPATIENT MEDS:    Current Facility-Administered Medications:   •  acetaminophen (TYLENOL) tablet 650 mg, 650 mg, Oral, Q6H PRN, Ki Goode MD, 650 mg at 12/13/22 2229  •  ARIPiprazole (ABILIFY) tablet 2 mg, 2 mg, Oral, Daily, Ki Goode MD, 2 mg at 12/14/22 1252  •  azelastine (ASTELIN) 0 1 % nasal spray 1 spray, 1 spray, Each Nare, BID, Ki Goode MD, 1 spray at 12/13/22 1218  •  bimatoprost (LUMIGAN) 0 01 % ophthalmic solution 1 drop, 1 drop, Both Eyes, HS, Ki Goode MD, 1 drop at 12/14/22 2132  •  calcitriol (ROCALTROL) capsule 0 25 mcg, 0 25 mcg, Oral, Daily, Ki Goode MD, 0 25 mcg at 12/13/22 1980  •  cefepime (MAXIPIME) 1,000 mg in dextrose 5 % 50 mL IVPB, 1,000 mg, Intravenous, Q24H, Ki Goode MD, Last Rate: 100 mL/hr at 12/14/22 1648, 1,000 mg at 12/14/22 1648  •  DULoxetine (CYMBALTA) delayed release capsule 30 mg, 30 mg, Oral, Daily, Ki Goode MD, 30 mg at 12/13/22 8786  •  ezetimibe (ZETIA) tablet 10 mg, 10 mg, Oral, Daily, Ki Goode MD, 10 mg at 12/14/22 1251  •  ferrous sulfate tablet 325 mg, 325 mg, Oral, Daily With Breakfast, Shay Knox Rosy Mehta MD, 325 mg at 12/15/22 0555  •  fluticasone (FLONASE) 50 mcg/act nasal spray 1 spray, 1 spray, Nasal, Daily, Alissa Martin MD, 1 spray at 12/13/22 1218  •  gabapentin (NEURONTIN) capsule 300 mg, 300 mg, Oral, HS, Alissa Martin MD, 300 mg at 12/14/22 2131  •  HYDROmorphone (DILAUDID) injection 0 5 mg, 0 5 mg, Intravenous, Q4H PRN, Alissa Martin MD, 0 5 mg at 12/14/22 2131  •  insulin glargine (LANTUS) subcutaneous injection 18 Units 0 18 mL, 18 Units, Subcutaneous, HS, Alissa Martin MD, 18 Units at 12/14/22 2131  •  insulin lispro (HumaLOG) 100 units/mL subcutaneous injection 1-5 Units, 1-5 Units, Subcutaneous, HS, Alissa Martin MD, 2 Units at 12/14/22 2132  •  insulin lispro (HumaLOG) 100 units/mL subcutaneous injection 1-6 Units, 1-6 Units, Subcutaneous, TID AC, 3 Units at 12/14/22 1553 **AND** Fingerstick Glucose (POCT), , , TID AC, Alissa Martin MD  •  insulin lispro (HumaLOG) 100 units/mL subcutaneous injection 5 Units, 5 Units, Subcutaneous, TID With Meals, Alissa Martin MD, 5 Units at 12/14/22 1553  •  isosorbide mononitrate (IMDUR) 24 hr tablet 30 mg, 30 mg, Oral, QAM, Alissa Martin MD, 30 mg at 12/14/22 1251  •  metoprolol succinate (TOPROL-XL) 24 hr tablet 100 mg, 100 mg, Oral, Daily, Alissa Martin MD, 100 mg at 12/14/22 1251  •  ondansetron (ZOFRAN) injection 4 mg, 4 mg, Intravenous, Q6H PRN, Alissa Martin MD  •  oxybutynin (DITROPAN-XL) 24 hr tablet 10 mg, 10 mg, Oral, Daily, Alissa Martin MD, 10 mg at 12/14/22 1251  •  oxyCODONE (ROXICODONE) immediate release tablet 10 mg, 10 mg, Oral, Q6H PRN, Alissa Martin MD, 10 mg at 12/14/22 1821  •  oxyCODONE (ROXICODONE) IR tablet 5 mg, 5 mg, Oral, Q6H PRN, Alissa Martin MD, 5 mg at 12/13/22 2006  •  pantoprazole (PROTONIX) EC tablet 40 mg, 40 mg, Oral, Early Morning, Alissa Martin MD, 40 mg at 12/15/22 0555  •  polyethylene glycol (MIRALAX) packet 17 g, 17 g, Oral, Daily, Kashif Gonzalez MD, 17 g at 12/13/22 7999  •  senna (SENOKOT) tablet 17 2 mg, 2 tablet, Oral, BID, Kashif Gonzalez MD, 17 2 mg at 12/14/22 1552  •  tamsulosin (FLOMAX) capsule 0 4 mg, 0 4 mg, Oral, Daily With Juma Lord MD, 0 4 mg at 12/14/22 1552  •  zolpidem (AMBIEN) tablet 5 mg, 5 mg, Oral, HS PRN, Kashif Gonzalez MD      Physical Exam:     GEN: alert and oriented x 3    SKIN: warm and dry  RESP: breathing comfortably with no accessory muscle use    CV: RRR, pulses palpable  ABD: soft, non-tender, non-distended   EXT: no significant peripheral edema   Right PCN: Site clean, dry, and intact, draining dark magnolia/brown-colored urine  Left PCN: Site clean, dry, and intact, draining clear yellow urine   VIGIL: draining pink clear urine  no clots slow CBI, Vigil catheter in place draining light pink-colored urine with sediment      Assessment:   22-year-old male with history of BCG refractory recurrent high-grade bladder cancer status post chemoradiation 2021 and currently receiving immunotherapy  He underwent placement of bilateral percutaneous nephrostomy tubes for ureteral obstruction  They remain in place right PCN draining dark magnolai/brown-colored urine wall left PCN draining clear yellow urine  Patient is now POD #1 cystoscopy with evacuation of clots and placement of right ureteral stent for persistent gross hematuria  Small clot burden was completely evacuated Vigil catheter remains in urethra draining light pink-tinged urine with sediment  Patient afebrile, vital signs stable  Creatinine 3 48, WBC 11 67, and hemoglobin 7 7  Plan:   -Maintain indwelling Vigil catheter and continue to titrate CBI to off  Urine output improving   -Maintain bilateral PCN, and continue daily flushes  -Plan to maintain right ureteral stent, and exchange with possible fulguration/TURBT in 3 months    Our office will arrange outpatient follow-up   -Continue to monitor renal function, creatinine slowly trending downward, now 3 48  Consider nephrology consult  -Monitor H&H    Urology will continue to follow      RN participated in rounds with AP  Plan of care discussed with patient and RN

## 2022-12-15 NOTE — RESTORATIVE TECHNICIAN NOTE
Restorative Technician Note      Patient Name: Maycol Renee     Restorative Tech Visit Date: 12/15/22  Note Type: Mobility  Patient Position Upon Consult: Supine  Activity Performed: Repositioned  Assistive Device: Other (Comment) (bed mobility for daily cleaning)  Patient Position at End of Consult: Supine;  All needs within [facility]

## 2022-12-15 NOTE — PLAN OF CARE
Problem: Nutrition/Hydration-ADULT  Goal: Nutrient/Hydration intake appropriate for improving, restoring or maintaining nutritional needs  Description: Monitor and assess patient's nutrition/hydration status for malnutrition  Collaborate with interdisciplinary team and initiate plan and interventions as ordered  Monitor patient's weight and dietary intake as ordered or per policy  Utilize nutrition screening tool and intervene as necessary  Determine patient's food preferences and provide high-protein, high-caloric foods as appropriate       INTERVENTIONS:  - Monitor oral intake, urinary output, labs, and treatment plans  - Assess nutrition and hydration status and recommend course of action  - Evaluate amount of meals eaten  - Assist patient with eating if necessary   - Allow adequate time for meals  - Recommend/ encourage appropriate diets, oral nutritional supplements, and vitamin/mineral supplements  - Order, calculate, and assess calorie counts as needed  - Recommend, monitor, and adjust tube feedings and TPN/PPN based on assessed needs  - Assess need for intravenous fluids  - Provide specific nutrition/hydration education as appropriate  - Include patient/family/caregiver in decisions related to nutrition  12/15/2022 1031 by Bobo Shetty RN  Outcome: Progressing  12/15/2022 1031 by Bobo Shetty RN  Outcome: Progressing     Problem: MOBILITY - ADULT  Goal: Maintain or return to baseline ADL function  Description: INTERVENTIONS:  -  Assess patient's ability to carry out ADLs; assess patient's baseline for ADL function and identify physical deficits which impact ability to perform ADLs (bathing, care of mouth/teeth, toileting, grooming, dressing, etc )  - Assess/evaluate cause of self-care deficits   - Assess range of motion  - Assess patient's mobility; develop plan if impaired  - Assess patient's need for assistive devices and provide as appropriate  - Encourage maximum independence but intervene and supervise when necessary  - Involve family in performance of ADLs  - Assess for home care needs following discharge   - Consider OT consult to assist with ADL evaluation and planning for discharge  - Provide patient education as appropriate  12/15/2022 1031 by Bishop Sullivan RN  Outcome: Progressing  12/15/2022 1031 by Bishop Sullivan RN  Outcome: Progressing  Goal: Maintains/Returns to pre admission functional level  Description: INTERVENTIONS:  - Perform BMAT or MOVE assessment daily    - Set and communicate daily mobility goal to care team and patient/family/caregiver  - Collaborate with rehabilitation services on mobility goals if consulted  - Perform Range of Motion *** times a day  - Reposition patient every *** hours    - Dangle patient *** times a day  - Stand patient *** times a day  - Ambulate patient *** times a day  - Out of bed to chair *** times a day   - Out of bed for meals *** times a day  - Out of bed for toileting  - Record patient progress and toleration of activity level   12/15/2022 1031 by Bishop Sullivan RN  Outcome: Progressing  12/15/2022 1031 by Bishop Sullivan RN  Outcome: Progressing     Problem: Potential for Falls  Goal: Patient will remain free of falls  Description: INTERVENTIONS:  - Educate patient/family on patient safety including physical limitations  - Instruct patient to call for assistance with activity   - Consult OT/PT to assist with strengthening/mobility   - Keep Call bell within reach  - Keep bed low and locked with side rails adjusted as appropriate  - Keep care items and personal belongings within reach  - Initiate and maintain comfort rounds  - Make Fall Risk Sign visible to staff  - Offer Toileting every *** Hours, in advance of need  - Initiate/Maintain ***alarm  - Obtain necessary fall risk management equipment: ***  - Apply yellow socks and bracelet for high fall risk patients  - Consider moving patient to room near nurses station  12/15/2022 1031 by Bishop Sullivan RN  Outcome: Progressing  12/15/2022 1031 by Angel Gao RN  Outcome: Progressing

## 2022-12-15 NOTE — ASSESSMENT & PLAN NOTE
Lab Results   Component Value Date    EGFR 15 12/15/2022    EGFR 14 12/14/2022    EGFR 15 12/13/2022    CREATININE 3 48 (H) 12/15/2022    CREATININE 3 80 (H) 12/14/2022    CREATININE 3 46 (H) 12/13/2022   · Baseline creatinine this year appears to be low to mid-3s  · Monitor renal function  · Consider Nephro consult

## 2022-12-15 NOTE — CONSULTS
Consultation - Nephrology   Mike Alva 76 y o  male MRN: [id number]  Unit/Bed#: [facility] 917-01 Encounter: [id number]    ASSESSMENT/PLAN:   1  CKD stage IV: Baseline creatinine 3-3 6 recently per his outpatient nephrologist notes  Follows with Dr Sommer Rush of [facility]   · Creatinine 3 57 on admission, peaked at 3 8 and is 3 48 today   · Lasix has been on hold   2  Obstructive uropathy: bilateral PCN tubes in place prior to admission  New right hydronephrosis this admission status post right ureteral stent placement  · Shirley catheter currently in place  3  Gross hematuria: Resolved  Status post CBI  4  High-grade bladder cancer status post chemoradiation  Currently on immunotherapy  5  Hypertension: intermittent hypotension  · Home meds: lasix 20mg daily (per med list but patient states taking every other day) and metoprolol 100mg daily   · Lasix currently on hold currently   6  Anemia of chronic disease and blood loss: hgb stable today at 7 7      Summary of Plan:   · Continue to trend BMP   · Ok to d/c from renal standpoint when cleared by primary team   · Can resume lasix every other day on discharge   · Outpatient follow up with Dr Liu Doty:  Requesting Physician: Inocencio Saucedo DO  Reason for Consult: CKD     Mike Alva is a 76y o  year old male with history of bladder cancer status post bilateral nephrostomy tubes who was admitted to [facility], gross hematuria  He notes ongoing hematuria and passing blood clots per urethra for a few weeks  This continue to worsen so he came to the ER  He was also having bloody drainage from his nephrostomy tubes  CT showed new right-sided hydronephrosis  Shirley catheter placed  Urology consulted and underwent right ureteral stent placement  He has a history of advanced CKD and nephrology was consulted for management today  Patient reports feeling okay    He denies any current chest pain, shortness of breath, nausea, vomiting or diarrhea  He is eating and drinking well  He does think he has some swelling in his legs  He normally takes Lasix 20 mg every other day at home  PAST MEDICAL HISTORY:  Past Medical History:   Diagnosis Date   • Anemia     Last assessed: 9/28/17   • Anxiety    • Arteriosclerotic cardiovascular disease     Last assessed: 9/28/17   • Arthritis    • Bladder cancer ([facility] )     bladder- had BCG treatments   • Chronic kidney disease     Stage IV   • CKD (chronic kidney disease) stage 4, GFR 15-29 ml/min ([facility])    • Colon polyp    • Coronary artery disease     7 stents   • Depression    • Diabetes mellitus ([facility])     IDDM   • GERD (gastroesophageal reflux disease)    • Glaucoma    • Hematuria    • History of fusion of cervical spine    • Hyperlipidemia    • Hypertension    • Insomnia     Last assessed: 11/14/12   • Loss of hearing     has hearing aids but usually does not wear them   • Lung cancer ([facility] )    • Metastatic cancer ([place] [address] )    • Other seasonal allergic rhinitis     Last assessed: 2/10/16   • PAD (peripheral artery disease) ([facility])    • Shortness of breath     on exertion   • Spinal stenosis of lumbar region    • Transient cerebral ischemia     No Residual   • Uses walker     w/c for longer distances       PAST SURGICAL HISTORY:  Past Surgical History:   Procedure Laterality Date   • CARDIAC SURGERY      Cath stent placement  Last assessed: 3/9/17  Interventional Catheterization   • CHOLECYSTECTOMY     • COLONOSCOPY     • CYSTOSCOPY      Diagnostic w/biopsy  Sanjana Domínguez  Last assessed: 12/1/14   • CYSTOSCOPY N/A 04/12/2022    Procedure: CYSTOSCOPY  Bladder biopsies  ;  Surgeon: Aishwarya Kerr MD;  [facility];  Service: Urology   • CYSTOSCOPY W/ RETROGRADES Right 03/01/2022    Procedure: CYSTO; stent removal retrograde;  Surgeon: Aishwarya Kerr MD;  [facility];  Service: Urology   • CYSTOSCOPY W/ URETERAL STENT PLACEMENT Bilateral 10/18/2021    Procedure: bilateral retrogrades, cytology collection;  Surgeon: Atif Camilo MD;  [facility];  Service: Urology   • CYSTOURETHROSCOPY      w/cautery  Christian Webb   • FL RETROGRADE PYELOGRAM  10/18/2021   • FL RETROGRADE PYELOGRAM  10/24/2021   • FL RETROGRADE PYELOGRAM  12/14/2022   • GASTRIC BYPASS      For morbid obesity w/Shaji-en-Y   Resolved: 11/17/09   • INCISION AND DRAINAGE OF WOUND Right 02/26/2017    Procedure: INCISION AND DRAINAGE (I&D) EXTREMITY WITH APPLICATION OF GRAFT JACKET;  Surgeon: Kayleigh Lozano DPM;  [facility];  Service:    • INCISION AND DRAINAGE OF WOUND Right 04/25/2017    Procedure: INCISION AND DRAINAGE (I&D) EXTREMITY, APPLICATION OF GRAFT;  Surgeon: Kayleigh Lozano DPM;  [facility];  Service:    • IR BIOPSY OTHER  07/02/2020   • IR LOWER EXTREMITY ANGIOGRAM  02/08/2021   • IR LOWER EXTREMITY ANGIOGRAM  02/11/2021   • IR NEPHROSTOMY TUBE CHECK/CHANGE/REPOSITION/REINSERTION/UPSIZE  04/28/2022   • IR NEPHROSTOMY TUBE CHECK/CHANGE/REPOSITION/REINSERTION/UPSIZE  05/24/2022   • IR NEPHROSTOMY TUBE CHECK/CHANGE/REPOSITION/REINSERTION/UPSIZE  06/07/2022   • IR NEPHROSTOMY TUBE CHECK/CHANGE/REPOSITION/REINSERTION/UPSIZE  07/28/2022   • IR NEPHROSTOMY TUBE CHECK/CHANGE/REPOSITION/REINSERTION/UPSIZE  11/15/2022   • IR NEPHROSTOMY TUBE PLACEMENT  02/25/2022   • IR PORT PLACEMENT  01/17/2022   • IR THORACENTESIS  09/02/2022   • IR THORACENTESIS  09/14/2022   • IR THORACENTESIS WITH TUBE PLACEMENT Left     october   • IR TUNNELED CENTRAL LINE PLACEMENT  12/24/2020   • JOINT REPLACEMENT      christofer knees replaced   • OK AMPUTATION METATARSAL+TOE,SINGLE Left 12/21/2020    Procedure: RAY RESECTION FOOT;  Surgeon: Ignacio Lynn DPM;  [facility];  Service: Podiatry   • OK AMPUTATION METATARSAL+TOE,SINGLE Left 12/31/2020    Procedure: 5TH MET RESECTION;  Surgeon: Ignacio Lynn DPM;  [facility];  Service: Podiatry   • OK CYSTOURETHROSCOPY W/IRRIG & EVAC CLOTS N/A 02/10/2021 Procedure: CYSTOSCOPY EVACUATION OF CLOTS, fulguration;  Surgeon: Jaimie Bernstein MD;  [facility];  Service: Urology   • AR CYSTOURETHROSCOPY W/IRRIG & EVAC CLOTS N/A 10/24/2021    Procedure: CYSTOSCOPY EVACUATION OF CLOT, fulguration of bleeding vessels, right ureter stent placement, retrograde pyelogram;  Surgeon: Davis Kelly MD;  [facility];  Service: Urology   • AR CYSTOURETHROSCOPY,BIOPSY N/A 08/16/2016    Procedure: Lalo Andrews;  Surgeon: Kendal Zhang MD;  [facility];  Service: Urology   • AR CYSTOURETHROSCOPY,FULGUR <0 5 CM LESN N/A 11/19/2020    Procedure: CYSTO W/BIOPSIES, transurethral prostate bx;  Surgeon: Jo Erickson MD;  [facility];  Service: Urology   • AR CYSTOURETHROSCOPY,FULGUR >5 CM LESN Bilateral 10/18/2021    Procedure: TRANSURETHRAL RESECTION OF BLADDER TUMOR (TURBT);   Surgeon: Kaci Figueroa MD;  [facility];  Service: Urology   • AR Marybelle Arrow 3RD+ ORD [address] PEL/LXTR [facility] Left 02/08/2021    Procedure: LEG angiogram, CO2 w/limited contrast with balloon angioplasty postertior tibial artery;  Surgeon: Denita Bennett MD;  [facility];  Service: Vascular   • ROTATOR CUFF REPAIR     • SMALL INTESTINE SURGERY      Surgery Shaji-en-Y   • SPINAL FUSION      lumbar and cervical fusions   • VAC DRESSING APPLICATION Right 11/84/7376    Procedure: APPLICATION VAC DRESSING;  Surgeon: Neli Caruso DPM;  [facility];  Service:    • WOUND DEBRIDEMENT Left 02/16/2021    Procedure: FOOT DEBRIDE, [address] OUT w/graft application;  Surgeon: Neli Caruso DPM;  [facility];  Service: Podiatry       ALLERGIES:  Allergies   Allergen Reactions   • Atorvastatin Hives, Itching and Rash   • Simvastatin Rash and Edema     Edema of lower legs   • Statins Hives and Itching   • Insulin Lispro Swelling and Edema     " Lower Legs"   • Other Itching, Rash and Other (See Comments)     "EKG Patches"   "blue EKG patches"       SOCIAL HISTORY:  Social History     Substance and Sexual Activity   Alcohol Use Never    Comment: beer / liquor     Social History     Substance and Sexual Activity   Drug Use Not Currently   • Types: Marijuana    Comment: quit 2019 had medical marijuana     Social History     Tobacco Use   Smoking Status Former   • Packs/day: 3 00   • Years: 27 00   • Pack years: 81 00   • Types: Cigarettes   • Quit date: 5   • Years since quittin 9   Smokeless Tobacco Never       FAMILY HISTORY:  Family History   Problem Relation Age of Onset   • Diabetes Mother    • Heart disease Mother    • Other Mother         High blood pressure   • Heart disease Father    • Diabetes Sister    • Other Sister         High blood pressure   • Kidney disease Sister    • Heart disease Brother    • Other Brother         High blood pressure       MEDICATIONS:  Scheduled Meds:  Current Facility-Administered Medications   Medication Dose Route Frequency Provider Last Rate   • acetaminophen  650 mg Oral Q6H PRN Rio Lancaster MD     • ARIPiprazole  2 mg Oral Daily Rio Lancaster MD     • azelastine  1 spray Each Nare BID Rio Lancaster MD     • bimatoprost  1 drop Both Eyes HS Rio Lancaster MD     • calcitriol  0 25 mcg Oral Daily Rio Lancaster MD     • cefTRIAXone  1,000 mg Intravenous Q24H Tonya Cody DO     • DULoxetine  30 mg Oral Daily Rio Lancaster MD     • ezetimibe  10 mg Oral Daily Rio Lancaster MD     • ferrous sulfate  325 mg Oral Daily With Breakfast Rio Lancaster MD     • fluticasone  1 spray Nasal Daily Rio Lancaster MD     • gabapentin  300 mg Oral HS Rio Lancaster MD     • HYDROmorphone  0 5 mg Intravenous Q4H PRN Rio Lancaster MD     • insulin glargine  18 Units Subcutaneous HS Rio Lancaster MD     • insulin lispro  1-5 Units Subcutaneous HS Rio Lanacster MD     • insulin lispro  1-6 Units Subcutaneous TID [facility] Bill Ho Jocy Finn MD     • insulin lispro  5 Units Subcutaneous TID With Meals Bob Pritchard MD     • isosorbide mononitrate  30 mg Oral QAM Bob Pritchard MD     • metoprolol succinate  100 mg Oral Daily Bob Pritchard MD     • ondansetron  4 mg Intravenous Q6H PRN Bob Pritchard MD     • oxybutynin  10 mg Oral Daily Bob Pritchard MD     • oxyCODONE  10 mg Oral Q6H PRN Bob Pritchard MD     • oxyCODONE  5 mg Oral Q6H PRN Bob Pritchard MD     • pantoprazole  40 mg Oral Early Morning Bob Pritchard MD     • polyethylene glycol  17 g Oral Daily Bob Pritchard MD     • senna  2 tablet Oral BID Bob Pritchard MD     • tamsulosin  0 4 mg Oral Daily With Ivan Ritter MD     • zolpidem  5 mg Oral HS PRN Bob Pritchard MD         PRN Meds: •  acetaminophen  •  HYDROmorphone  •  ondansetron  •  oxyCODONE  •  oxyCODONE  •  zolpidem    REVIEW OF SYSTEMS:  A complete review of systems was done  Pertinent positives and negatives noted in the HPI but otherwise the review of systems is negative      PHYSICAL EXAM:  Current Weight: Weight - Scale: 96 2 kg (212 lb)  First Weight: Weight - Scale: 96 2 kg (212 lb)  Vitals:    12/15/22 0718   BP: 127/68   Pulse: 84   Resp:    Temp: 97 5 °F (36 4 °C)   SpO2: 93%       Intake/Output Summary (Last 24 hours) at 12/15/2022 1229  Last data filed at 12/15/2022 1001  Gross per 24 hour   Intake 140 ml   Output -550 ml   Net 690 ml     General:  appears comfortable and in no acute distress   Skin:  No rash, warm, good skin turgor   Eyes:  Sclerae anicteric, no periorbital edema   ENT:  Moist mucous membranes  Neck:  Trachea midline, symmetric   Chest:  Clear to auscultation bilaterally with no wheezes, rales or rhonchi  CVS:  Regular rate and rhythm  Abdomen:  Soft, nontender, nondistended  Neuro:  Awake and alert  Psych:  Appropriate affect  Extremities: +ankle edema L>R   : wagner in place with clear urine output, bilateral PCNs     Lab Results:   Results from last 7 days   Lab Units 12/15/22  0539 12/14/22  1646 12/14/22  0610 12/13/22  0456 12/12/22  1358   WBC Thousand/uL 11 67*  --  9 31 12 95* 16 61*   HEMOGLOBIN g/dL 7 7* 7 1* 7 0* 7 9* 8 5*   HEMATOCRIT % 24 0* 23 8* 22 1* 24 4* 26 7*   PLATELETS Thousands/uL 237  --  230 235 247   SODIUM mmol/L 137  --  135 135 131*   POTASSIUM mmol/L 3 9  --  4 0 3 8 3 7   CHLORIDE mmol/L 106  --  106 105 102   CO2 mmol/L 24  --  23 22 20*   BUN mg/dL 49*  --  53* 50* 51*   CREATININE mg/dL 3 48*  --  3 80* 3 46* 3 57*   CALCIUM mg/dL 9 0  --  9 0 9 1 9 4       Radiology Results:   FL retrograde pyelogram   Final Result by Guera Morin MD (12/14 8195)      Fluoroscopic guidance provided for procedure guidance  Please refer to the separate procedure notes for additional details  Workstation performed: [id number]         CT abdomen pelvis wo contrast   Final Result by Mala Gallego MD (12/12 1806)      Stable bladder wall thickening towards the left compatible with known neoplasm  New right hydronephrosis with high density intraluminal material in the distal right ureter likely representing blood  Stable metastatic retroperitoneal lymphadenopathy  Stable left pleural effusion with epicardial metastasis              Workstation performed: [id number]

## 2022-12-15 NOTE — PROGRESS NOTES
[facility]  Progress Note - Rebecca Gilman 1945, 76 y o  male MRN: [id number]  Unit/Bed#: [facility] 917-01 Encounter: [id number]  Primary Care Provider: Robby Sanderson MD   Date and time admitted to hospital: 12/12/2022  3:38 PM    * Hematuria  Assessment & Plan  · Acute on chronic  · CT on admission revealed stable bladder wall thickening toward the left compatible with known neoplasm, new right hydronephrosis with high density intraluminal material in the distal right ureter likely representing blood, stable metastatic retroperitoneal lymphadenopathy, and 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  · Hemoglobin 8 5 on admission; 7 0 today  · Urology consult appreciated - plan to go to OR today for cystoscopy, clot evacuation, possible transurethral resection/fulguration of bleeding points within bladder and bilateral retrograde studies  · Monitor H&H, transfuse as needed for Hgb <7  · Patient with bilateral nephrostomy tubes and had a Shirley placed in ED    UTI (urinary tract infection)  Assessment & Plan  · PMH of Recurrent UTI on suppressive bactrim at home, presented with UTI     Will need to hold Bactrim given acute on chronic renal failure  · Was placed on cefepime on admission  Prelim urine culture growing >100,000 gram negative rebeca  · Culture sensitivities show: Enterobacter and Enterococcus  · De-escalate to Rocephin  · Possible colonization with Enterococcus given patient without fever spike despite resistance pattern    · Suspect leukocytosis may be postprocedure effect cystoscopy yesterday    CKD (chronic kidney disease)  Assessment & Plan  Lab Results   Component Value Date    EGFR 15 12/15/2022    EGFR 14 12/14/2022    EGFR 15 12/13/2022    CREATININE 3 48 (H) 12/15/2022    CREATININE 3 80 (H) 12/14/2022    CREATININE 3 46 (H) 12/13/2022   · Baseline creatinine this year appears to be low to mid-3s  · Monitor renal function  · Consider Nephro consult Chronic pain syndrome  Assessment & Plan  · Pain control with oxycodone at home  · Pain control       Moderate major depression, single episode (HCC)  Assessment & Plan  · Continue home meds    PAD (peripheral artery disease) (HCC)  Assessment & Plan  · Aspirin on hold as above    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)  Assessment & Plan  · Diagnosed in  s/p treatment with BCG, however recurrence, s/p chemoradiation in   · Has bilateral nephrostomy tube  · Patient prescribed prednisone 20 mg daily - patient's granddaughter reports that he was prescribed this for his pleural effusion but was not taking  Patient refused prednisone yesterday  Discontinued order  · Urology following     Type 2 diabetes mellitus, with long-term current use of insulin [facility])  Assessment & Plan  Lab Results   Component Value Date    HGBA1C 7 2 (H) 10/20/2022       Recent Labs     22  1145 22  1552 22  2216 12/15/22  0720   POCGLU 139 263* 267* 137       Blood Sugar Average: Last 72 hrs:  (P) 194 [id number]     · Continue home insulin lantus 18 units at bedtime, Humalog 5 units TID   · Sliding scale    Hypertension with renal disease  Assessment & Plan  · Continue metoprolol, imdur  · Lasix on hold given rise in creatinine   · Avoid hypotension     Coronary artery disease of native artery of native heart with stable angina pectoris ([facility])  Assessment & Plan  · Continue metoprolol and isosorbide  · Hold aspirin due to hematuria      Mechanical VTE Prophylaxis in Place: No    Patient Centered Rounds: I have performed bedside rounds with nursing staff today  Time Spent for Care: 15 minutes  More than 50% of total time spent on counseling and coordination of care as described above      Current Length of Stay: 3 day(s)    Current Patient Status: Inpatient         Code Status: Level 1 - Full Code      Subjective:   nad    Objective:     Vitals:   Temp (24hrs), Av 5 °F (36 4 °C), Min:97 2 °F (36 2 °C), Max:97 9 °F (36 6 °C)    Temp:  [97 2 °F (36 2 °C)-97 9 °F (36 6 °C)] 97 5 °F (36 4 °C)  HR:  [76-90] 84  Resp:  [11-20] 19  BP: ()/(53-76) 127/68  SpO2:  [93 %-100 %] 93 %  Body mass index is 26 5 kg/m²  Input and Output Summary (last 24 hours): Intake/Output Summary (Last 24 hours) at 12/15/2022 1026  Last data filed at 12/15/2022 0900  Gross per 24 hour   Intake 170 ml   Output 200 ml   Net -30 ml       Physical Exam:     Physical Exam  Constitutional:       Appearance: Normal appearance  HENT:      Head: Normocephalic and atraumatic  Cardiovascular:      Rate and Rhythm: Normal rate and regular rhythm  Pulmonary:      Effort: Pulmonary effort is normal       Breath sounds: Normal breath sounds  Abdominal:      General: Abdomen is flat  Palpations: Abdomen is soft  Neurological:      General: No focal deficit present  Mental Status: He is alert and oriented to person, place, and time  Psychiatric:         Mood and Affect: Mood normal          Additional Data:     Labs:    Results from last 7 days   Lab Units 12/15/22  0539 12/13/22 0456 12/12/22  1358   WBC Thousand/uL 11 67*   < > 16 61*   HEMOGLOBIN g/dL 7 7*   < > 8 5*   HEMATOCRIT % 24 0*   < > 26 7*   PLATELETS Thousands/uL 237   < > 247   NEUTROS PCT %  --   --  84*   LYMPHS PCT %  --   --  7*   MONOS PCT %  --   --  7   EOS PCT %  --   --  1    < > = values in this interval not displayed  Results from last 7 days   Lab Units 12/15/22  0539 12/13/22  0456 12/12/22  1358   POTASSIUM mmol/L 3 9   < > 3 7   CHLORIDE mmol/L 106   < > 102   CO2 mmol/L 24   < > 20*   BUN mg/dL 49*   < > 51*   CREATININE mg/dL 3 48*   < > 3 57*   CALCIUM mg/dL 9 0   < > 9 4   ALK PHOS U/L  --   --  114   ALT U/L  --   --  21   AST U/L  --   --  19    < > = values in this interval not displayed       Results from last 7 days   Lab Units 12/12/22  1358   INR  1 01       * I Have Reviewed All Lab Data Listed Above   * Additional Pertinent Lab Tests Reviewed:  All Labs Within Last 24 Hours Reviewed        Recent Cultures (last 7 days):     Results from last 7 days   Lab Units 12/12/22  1359   URINE CULTURE  >100,000 cfu/ml Enterobacter cloacae*  >100,000 cfu/ml Enterococcus faecalis*       Last 24 Hours Medication List:   Current Facility-Administered Medications   Medication Dose Route Frequency Provider Last Rate   • acetaminophen  650 mg Oral Q6H PRN López Mahoney MD     • ARIPiprazole  2 mg Oral Daily López Mahoney MD     • azelastine  1 spray Each Nare BID López Mahoney MD     • bimatoprost  1 drop Both Eyes HS López Mahoney MD     • calcitriol  0 25 mcg Oral Daily López Mahoney MD     • cefTRIAXone  1,000 mg Intravenous Q24H Tonya Cody DO     • DULoxetine  30 mg Oral Daily López Mahoney MD     • ezetimibe  10 mg Oral Daily López Mahoney MD     • ferrous sulfate  325 mg Oral Daily With Breakfast López Mahoney MD     • fluticasone  1 spray Nasal Daily López Mahoney MD     • gabapentin  300 mg Oral HS López Mahoney MD     • HYDROmorphone  0 5 mg Intravenous Q4H PRN López Mahoney MD     • insulin glargine  18 Units Subcutaneous HS López Mahoney MD     • insulin lispro  1-5 Units Subcutaneous HS López Mahoney MD     • insulin lispro  1-6 Units Subcutaneous TID Sonia Samuel MD     • insulin lispro  5 Units Subcutaneous TID With Meals López Mahoney MD     • isosorbide mononitrate  30 mg Oral QAM López Mahoney MD     • metoprolol succinate  100 mg Oral Daily López Mahoney MD     • ondansetron  4 mg Intravenous Q6H PRN López Mahoney MD     • oxybutynin  10 mg Oral Daily López Mahoney MD     • oxyCODONE  10 mg Oral Q6H PRN López Mahoney MD     • oxyCODONE  5 mg Oral Q6H PRN López Mahoney MD     • pantoprazole  40 mg Oral Early Morning López Mahoney MD     • polyethylene glycol  17 g Oral Daily Estefanía Garduno MD     • senna  2 tablet Oral BID Estefanía Garduno MD     • tamsulosin  0 4 mg Oral Daily With Chiki Alberto MD     • zolpidem  5 mg Oral HS PRN Estefanía Garduno MD          Today, Patient Was Seen By: Josué Boss DO    ** Please Note: Dictation voice to text software may have been used in the creation of this document   **

## 2022-12-15 NOTE — RESTORATIVE TECHNICIAN NOTE
Restorative Technician Note      Patient Name: Josee Irizarry     Restorative Tech Visit Date: 12/15/22  Note Type: Mobility  Patient Position Upon Consult: Supine  Activity Performed: Repositioned  Assistive Device: Other (Comment) (bed mobility for daily cleaning)  Patient Position at End of Consult: Supine;  All needs within [facility]

## 2022-12-15 NOTE — CASE MANAGEMENT
Case Management Discharge Planning Note    Patient name Josee Irizarry  Location [facility]-50 MRN [id number]  : 1945 Date 12/15/2022       Current Admission Date: 2022  Current Admission Diagnosis:Hematuria   Patient Active Problem List    Diagnosis Date Noted   • Recurrent left pleural effusion 2022   • UTI (urinary tract infection) 2022   • Shortness of breath 2022   • History of tobacco use disorder 2022   • Retroperitoneal lymphadenopathy 2022   • Pulmonary nodules 2022   • Syncope and collapse 2022   • Depression with suicidal ideation 2022   • Cancer related pain 2022   • Bilateral hydronephrosis 04/10/2022   • CKD (chronic kidney disease) 2022   • Fall 2022   • Hyperkalemia 2022   • Retained ureteral stent    • 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[address] ) 2022   • Embolism and 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 ([address] ) 2022   • Abnormal CT scan, bladder 2022   • Port-A-Cath in place 2022   • Continuous opioid dependence ([address] ) 2021   • Hematuria 10/24/2021   • Acute urinary retention 10/21/2021   • Chronic pain syndrome 2021   • Lumbar spondylosis    • Chronic bronchitis ([address] ) 2021   • Moderate major depression, single episode ([address] ) 2021   • Thrombocytopenia ([address] ) 2021   • 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 2021   • Gross hematuria 2021   • PAD (peripheral artery disease) (HCC)    • Left carotid bruit    • Anemia of chronic disease 2020   • Preoperative clearance 2020   • Chronic anemia 10/10/2020   • Diabetic ulcer of left foot associated with type 2 diabetes mellitus, 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 ([address] ) 10/08/2020   • Acute kidney injury superimposed on CKD [facility])    • Dysuria 2020   • Diabetic polyneuropathy associated with type 2 diabetes mellitus ([address] ) 2020   • Abnormal MRI, lumbar spine 06/29/2020   • Malignant neoplasm of anterior wall of urinary bladder ([facility] )    • Acute renal failure (ARF) ([facility] ) 02/12/2020   • Secondary renal hyperparathyroidism ([facility] ) 02/12/2020   • Preop examination 04/16/2019   • Superficial phlebitis 03/07/2019   • Arteriosclerosis of artery of extremity ([facility] ) 02/22/2019   • Stable angina pectoris ([facility] ) 02/22/2019   • Herpes zoster without complication 59/31/8093   • Localized edema 02/22/2019   • Back pain 01/31/2019   • Degeneration of lumbar intervertebral disc 12/03/2018   • Primary osteoarthritis of left knee 03/16/2018   • Bladder carcinoma ([facility] ) 08/16/2016   • Presence of stent in coronary artery 08/16/2016   • Hypertension with renal disease 10/29/2015   • Hyperlipidemia 10/29/2015   • Cystitis due to intravesical BCG administration 09/24/2015   • Coronary artery disease of native artery of native heart with stable angina pectoris ([facility] ) 05/19/2011   • Type 2 diabetes mellitus, with long-term current use of insulin ([facility] ) 01/09/2004      LOS (days): 3  Geometric Mean LOS (GMLOS) (days): 2 80  Days to GMLOS:0 1     OBJECTIVE:  Risk of Unplanned Readmission Score: 54 59         Current admission status: Inpatient   Preferred Pharmacy:   [address] [place] [address]  Phone: [phone] Fax: [phone]    Primary Care Provider: Altagracia Morrow MD    Primary Insurance: MEDICARE  Secondary Insurance: BLUE CROSS    DISCHARGE DETAILS:             Additional Comments: Patient is not medically cleared for d/c at this time  Possible Nephrology consult  Patient continues with b/l PCN placement as well as wagner at this time

## 2022-12-15 NOTE — ASSESSMENT & PLAN NOTE
· PMH of Recurrent UTI on suppressive bactrim at home, presented with UTI     Will need to hold Bactrim given acute on chronic renal failure  · Was placed on cefepime on admission  Prelim urine culture growing >100,000 gram negative rebeca  · Culture sensitivities show: Enterobacter and Enterococcus  · De-escalate to Rocephin  · Possible colonization with Enterococcus given patient without fever spike despite resistance pattern    · Suspect leukocytosis may be postprocedure effect cystoscopy yesterday

## 2022-12-15 NOTE — ASSESSMENT & PLAN NOTE
Lab Results   Component Value Date    HGBA1C 7 2 (H) 10/20/2022       Recent Labs     12/14/22  1145 12/14/22  1552 12/14/22  2216 12/15/22  0720   POCGLU 139 263* 267* 137       Blood Sugar Average: Last 72 hrs:  (P) 194 [id number]     · Continue home insulin lantus 18 units at bedtime, Humalog 5 units TID   · Sliding scale

## 2022-12-16 ENCOUNTER — APPOINTMENT (INPATIENT)
Dept: RADIOLOGY | Facility: HOSPITAL | Age: 77
End: 2022-12-16

## 2022-12-16 ENCOUNTER — HOSPITAL ENCOUNTER (OUTPATIENT)
Dept: INFUSION CENTER | Facility: HOSPITAL | Age: 77
End: 2022-12-16
Attending: INTERNAL MEDICINE

## 2022-12-16 PROBLEM — M21.331 RIGHT WRIST DROP: Status: ACTIVE | Noted: 2022-12-16

## 2022-12-16 PROBLEM — G56.31 RADIAL NERVE PALSY, RIGHT: Status: ACTIVE | Noted: 2022-12-16

## 2022-12-16 LAB
ABO GROUP BLD BPU: NORMAL
ANION GAP SERPL CALCULATED.3IONS-SCNC: 7 MMOL/L (ref 4–13)
BPU ID: NORMAL
BUN SERPL-MCNC: 50 MG/DL (ref 5–25)
CALCIUM SERPL-MCNC: 8.6 MG/DL (ref 8.3–10.1)
CHLORIDE SERPL-SCNC: 108 MMOL/L (ref 96–108)
CO2 SERPL-SCNC: 24 MMOL/L (ref 21–32)
CREAT SERPL-MCNC: 3.35 MG/DL (ref 0.6–1.3)
CROSSMATCH: NORMAL
GFR SERPL CREATININE-BSD FRML MDRD: 16 ML/MIN/1.73SQ M
GLUCOSE SERPL-MCNC: 153 MG/DL (ref 65–140)
GLUCOSE SERPL-MCNC: 165 MG/DL (ref 65–140)
GLUCOSE SERPL-MCNC: 235 MG/DL (ref 65–140)
GLUCOSE SERPL-MCNC: 78 MG/DL (ref 65–140)
GLUCOSE SERPL-MCNC: 79 MG/DL (ref 65–140)
POTASSIUM SERPL-SCNC: 4.1 MMOL/L (ref 3.5–5.3)
SODIUM SERPL-SCNC: 139 MMOL/L (ref 135–147)
UNIT DISPENSE STATUS: NORMAL
UNIT PRODUCT CODE: NORMAL
UNIT PRODUCT VOLUME: 350 ML
UNIT RH: NORMAL

## 2022-12-16 RX ADMIN — CALCITRIOL CAPSULES 0.25 MCG 0.25 MCG: 0.25 CAPSULE ORAL at 09:38

## 2022-12-16 RX ADMIN — ISOSORBIDE MONONITRATE 30 MG: 30 TABLET, EXTENDED RELEASE ORAL at 09:38

## 2022-12-16 RX ADMIN — INSULIN LISPRO 1 UNITS: 100 INJECTION, SOLUTION INTRAVENOUS; SUBCUTANEOUS at 21:36

## 2022-12-16 RX ADMIN — PANTOPRAZOLE SODIUM 40 MG: 40 TABLET, DELAYED RELEASE ORAL at 05:50

## 2022-12-16 RX ADMIN — INSULIN LISPRO 5 UNITS: 100 INJECTION, SOLUTION INTRAVENOUS; SUBCUTANEOUS at 11:55

## 2022-12-16 RX ADMIN — INSULIN LISPRO 1 UNITS: 100 INJECTION, SOLUTION INTRAVENOUS; SUBCUTANEOUS at 17:01

## 2022-12-16 RX ADMIN — SENNOSIDES 17.2 MG: 8.6 TABLET, FILM COATED ORAL at 09:38

## 2022-12-16 RX ADMIN — TAMSULOSIN HYDROCHLORIDE 0.4 MG: 0.4 CAPSULE ORAL at 15:46

## 2022-12-16 RX ADMIN — INSULIN LISPRO 4 UNITS: 100 INJECTION, SOLUTION INTRAVENOUS; SUBCUTANEOUS at 11:55

## 2022-12-16 RX ADMIN — EZETIMIBE 10 MG: 10 TABLET ORAL at 09:38

## 2022-12-16 RX ADMIN — BIMATOPROST 1 DROP: 0.1 SOLUTION/ DROPS OPHTHALMIC at 21:50

## 2022-12-16 RX ADMIN — OXYCODONE HYDROCHLORIDE 10 MG: 10 TABLET ORAL at 15:46

## 2022-12-16 RX ADMIN — OXYBUTYNIN 10 MG: 10 TABLET, FILM COATED, EXTENDED RELEASE ORAL at 09:38

## 2022-12-16 RX ADMIN — INSULIN GLARGINE 18 UNITS: 100 INJECTION, SOLUTION SUBCUTANEOUS at 21:36

## 2022-12-16 RX ADMIN — INSULIN LISPRO 5 UNITS: 100 INJECTION, SOLUTION INTRAVENOUS; SUBCUTANEOUS at 17:01

## 2022-12-16 RX ADMIN — OXYCODONE HYDROCHLORIDE 10 MG: 10 TABLET ORAL at 21:35

## 2022-12-16 RX ADMIN — SENNOSIDES 17.2 MG: 8.6 TABLET, FILM COATED ORAL at 17:01

## 2022-12-16 RX ADMIN — OXYCODONE HYDROCHLORIDE 10 MG: 10 TABLET ORAL at 05:50

## 2022-12-16 RX ADMIN — DULOXETINE 30 MG: 30 CAPSULE, DELAYED RELEASE ORAL at 09:38

## 2022-12-16 RX ADMIN — ARIPIPRAZOLE 2 MG: 2 TABLET ORAL at 09:38

## 2022-12-16 RX ADMIN — CEFTRIAXONE 1000 MG: 1 INJECTION, POWDER, FOR SOLUTION INTRAMUSCULAR; INTRAVENOUS at 17:03

## 2022-12-16 RX ADMIN — METOPROLOL SUCCINATE 100 MG: 100 TABLET, EXTENDED RELEASE ORAL at 09:38

## 2022-12-16 RX ADMIN — ZOLPIDEM TARTRATE 5 MG: 5 TABLET, COATED ORAL at 21:35

## 2022-12-16 RX ADMIN — POLYETHYLENE GLYCOL 3350 17 G: 17 POWDER, FOR SOLUTION ORAL at 09:38

## 2022-12-16 RX ADMIN — GABAPENTIN 300 MG: 300 CAPSULE ORAL at 21:36

## 2022-12-16 RX ADMIN — FERROUS SULFATE TAB 325 MG (65 MG ELEMENTAL FE) 325 MG: 325 (65 FE) TAB at 09:38

## 2022-12-16 NOTE — PROGRESS NOTES
[facility]  Progress Note - Karely Margaret 1945, 76 y o  male MRN: [id number]  Unit/Bed#: [facility] 917-01 Encounter: [id number]  Primary Care Provider: Jacob Smith MD   Date and time admitted to hospital: 12/12/2022  3:38 PM    * Hematuria  Assessment & Plan  · Acute on chronic  · CT on admission revealed stable bladder wall thickening toward the left compatible with known neoplasm, new right hydronephrosis with high density intraluminal material in the distal right ureter likely representing blood, stable metastatic retroperitoneal lymphadenopathy, and 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  · Hemoglobin 8 5 on admission; 7 0 today  · Urology consult appreciated - plan to go to OR today for cystoscopy, clot evacuation, possible transurethral resection/fulguration of bleeding points within bladder and bilateral retrograde studies  · Monitor H&H, transfuse as needed for Hgb <7  · Patient with bilateral nephrostomy tubes and had a Shirley placed in ED    Radial nerve palsy, right  Assessment & Plan  Follow-up on imaging rule out fracture    UTI (urinary tract infection)  Assessment & Plan  · PMH of Recurrent UTI on suppressive bactrim at home, presented with UTI     Will need to hold Bactrim given acute on chronic renal failure  · Was placed on cefepime on admission  Prelim urine culture growing >100,000 gram negative rebeca  · Culture sensitivities show: Enterobacter and Enterococcus  · De-escalate to Rocephin  · Possible colonization with Enterococcus given patient without fever spike despite resistance pattern    · Suspect leukocytosis may be postprocedure effect cystoscopy yesterday    CKD (chronic kidney disease)  Assessment & Plan  Lab Results   Component Value Date    EGFR 16 12/16/2022    EGFR 15 12/15/2022    EGFR 14 12/14/2022    CREATININE 3 35 (H) 12/16/2022    CREATININE 3 48 (H) 12/15/2022    CREATININE 3 80 (H) 12/14/2022   · Baseline creatinine this year appears to be low to mid-3s  · Monitor renal function  · Consider Nephro consult     Chronic pain syndrome  Assessment & Plan  · Pain control with oxycodone at home  · Pain control       Moderate major depression, single episode (HCC)  Assessment & Plan  · Continue home meds    PAD (peripheral artery disease) ([facility])  Assessment & Plan  · Aspirin on hold as above    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)  Assessment & Plan  · Diagnosed in 1994 s/p treatment with BCG, however recurrence, s/p chemoradiation in 2021  · Has bilateral nephrostomy tube  · Patient prescribed prednisone 20 mg daily - patient's granddaughter reports that he was prescribed this for his pleural effusion but was not taking  Patient refused prednisone yesterday  Discontinued order  · Urology following     Type 2 diabetes mellitus, with long-term current use of insulin [facility])  Assessment & Plan  Lab Results   Component Value Date    HGBA1C 7 2 (H) 10/20/2022       Recent Labs     12/15/22  1201 12/15/22  1629 12/15/22  2215 12/16/22  0640   POCGLU 164* 223* 160* 78       Blood Sugar Average: Last 72 hrs:  (P) 503 [id number]     · Continue home insulin lantus 18 units at bedtime, Humalog 5 units TID   · Sliding scale    Hypertension with renal disease  Assessment & Plan  · Continue metoprolol, imdur  · Lasix on hold given rise in creatinine   · Avoid hypotension     Coronary artery disease of native artery of native heart with stable angina pectoris ([facility])  Assessment & Plan  · Continue metoprolol and isosorbide  · Hold aspirin due to hematuria        VTE Pharmacologic Prophylaxis:     Mechanical VTE Prophylaxis in Place: No    Patient Centered Rounds: I have performed bedside rounds with nursing staff today  Time Spent for Care: 15 minutes  More than 50% of total time spent on counseling and coordination of care as described above      Current Length of Stay: 4 day(s)    Current Patient Status: Inpatient       Code Status: Level 1 - Full Code      Subjective:   nad    Objective:     Vitals:   Temp (24hrs), Av 5 °F (36 4 °C), Min:97 3 °F (36 3 °C), Max:98 1 °F (36 7 °C)    Temp:  [97 3 °F (36 3 °C)-98 1 °F (36 7 °C)] 97 3 °F (36 3 °C)  HR:  [75-98] 98  Resp:  [16-18] 16  BP: (102-118)/(58-67) 114/65  SpO2:  [93 %-98 %] 95 %  Body mass index is 26 5 kg/m²  Input and Output Summary (last 24 hours): Intake/Output Summary (Last 24 hours) at 2022 1128  Last data filed at 2022 0901  Gross per 24 hour   Intake 240 ml   Output 2495 ml   Net -2255 ml       Physical Exam:     Physical Exam  Constitutional:       Appearance: Normal appearance  HENT:      Head: Normocephalic and atraumatic  Cardiovascular:      Rate and Rhythm: Normal rate and regular rhythm  Pulmonary:      Effort: Pulmonary effort is normal       Breath sounds: Normal breath sounds  Abdominal:      General: Abdomen is flat  Palpations: Abdomen is soft  Musculoskeletal:      Cervical back: Normal range of motion and neck supple  Skin:     General: Skin is warm and dry  Neurological:      General: No focal deficit present  Mental Status: He is alert and oriented to person, place, and time  Psychiatric:         Mood and Affect: Mood normal          Behavior: Behavior normal          Additional Data:     Labs:    Results from last 7 days   Lab Units 12/15/22  0539 22  0456 22  1358   WBC Thousand/uL 11 67*   < > 16 61*   HEMOGLOBIN g/dL 7 7*   < > 8 5*   HEMATOCRIT % 24 0*   < > 26 7*   PLATELETS Thousands/uL 237   < > 247   NEUTROS PCT %  --   --  84*   LYMPHS PCT %  --   --  7*   MONOS PCT %  --   --  7   EOS PCT %  --   --  1    < > = values in this interval not displayed       Results from last 7 days   Lab Units 22  0548 22  0456 22  1358   POTASSIUM mmol/L 4 1   < > 3 7   CHLORIDE mmol/L 108   < > 102   CO2 mmol/L 24   < > 20*   BUN mg/dL 50*   < > 51*   CREATININE mg/dL 3 35*   < > 3 57*   CALCIUM mg/dL 8 6   < > 9 4   ALK PHOS U/L  --   --  114   ALT U/L  --   --  21   AST U/L  --   --  19    < > = values in this interval not displayed  Results from last 7 days   Lab Units 12/12/22  1358   INR  1 01       * I Have Reviewed All Lab Data Listed Above  * Additional Pertinent Lab Tests Reviewed:  All Labs Within Last 24 Hours Reviewed      Recent Cultures (last 7 days):     Results from last 7 days   Lab Units 12/12/22  1359   URINE CULTURE  >100,000 cfu/ml Enterobacter cloacae*  >100,000 cfu/ml Enterococcus faecalis*       Last 24 Hours Medication List:   Current Facility-Administered Medications   Medication Dose Route Frequency Provider Last Rate   • acetaminophen  650 mg Oral Q6H PRN Karen Browning MD     • ARIPiprazole  2 mg Oral Daily Karen Browning MD     • azelastine  1 spray Each Nare BID Karen Browning MD     • bimatoprost  1 drop Both Eyes HS Karen Browning MD     • calcitriol  0 25 mcg Oral Daily Karen Browning MD     • cefTRIAXone  1,000 mg Intravenous Q24H Hetul Cody, DO 1,000 mg (12/15/22 2445)   • DULoxetine  30 mg Oral Daily Karen Browning MD     • ezetimibe  10 mg Oral Daily Karen Browning MD     • ferrous sulfate  325 mg Oral Daily With Breakfast Karen Browning MD     • fluticasone  1 spray Nasal Daily Karen Browning MD     • gabapentin  300 mg Oral HS Karen Browning MD     • HYDROmorphone  0 5 mg Intravenous Q4H PRN Karen Browning MD     • insulin glargine  18 Units Subcutaneous HS Karen Browning MD     • insulin lispro  1-5 Units Subcutaneous HS Karen Browning MD     • insulin lispro  1-6 Units Subcutaneous TID AC Karen Browning MD     • insulin lispro  5 Units Subcutaneous TID With Meals Karen Browning MD     • isosorbide mononitrate  30 mg Oral QAM Karen Browning MD     • metoprolol succinate  100 mg Oral Daily Karen Browning MD     • ondansetron  4 mg Intravenous Q6H PRN Esau Myers MD     • oxybutynin  10 mg Oral Daily Esau Myers MD     • oxyCODONE  10 mg Oral Q6H PRN Esau Myers MD     • oxyCODONE  5 mg Oral Q6H PRN Esau Myers MD     • pantoprazole  40 mg Oral Early Morning Esau Myers MD     • polyethylene glycol  17 g Oral Daily Esau Myers MD     • senna  2 tablet Oral BID Esau Myers MD     • tamsulosin  0 4 mg Oral Daily With Jass Herndon MD     • zolpidem  5 mg Oral HS PRN Esau Myers MD          Today, Patient Was Seen By: Papito Bhat DO    ** Please Note: Dictation voice to text software may have been used in the creation of this document   **

## 2022-12-16 NOTE — PROGRESS NOTES
[facility] PROGRESS NOTE   Saman Patton 76 y o  male MRN: [id number]  Unit/Bed#: [facility] 917-01 Encounter: [id number]  Reason for Consult: CKD    ASSESSMENT and PLAN:  1  Chronic kidney disease, stage IV  · Baseline creatinine 3 0 to 3 6  · Follows with Dr Jesse Jones  · Admission creatinine SCr 3 57  on 12/12/22  · Stable renal function  Creatinine 3 35   · Stable electrolytes      2  Hx of obstructive uropathy  · s/p bilateral PCN from a few years ago  · New R hydronephrosis noted on imaging this admission  · S/p cystoscopy and R ureteral stent placement on 12/14/22     3  Hematuria, bladder cancer  · Heavy disease burden per urology  · S/p cysto and fulguration of bleeding points + evac of clots 12/4/22  · Follow up with Dr Farley Brood  · Hematuria resolving       4  Hypertension  · BP acceptable  · Meds: Metoprolol succ 100 mg OD  · He is on Furosemide 20 mg QOD as an outpatient  · Restart Furosemide 20 mg QOD on discharge       5  Hx of hyperkalemia:   · He is on Veltassa QOD   · Restart Veltassa on discharge  6  Anemia  · Hgb 7 7  · Stable    DISPOSITION:  · Renal function remains stable  · May be discharged from renal standpoint  · Restart Furosemide and Veltassa at previous doses on discharge  · Will need follow up with Dr Jesse Jones on discharge  Discussed with Dr Ivet Pollock  SUBJECTIVE / 24H INTERVAL HISTORY:  No chest pain or shortness of breath  Developed right hand weakness earlier this morning      OBJECTIVE:  Current Weight: Weight - Scale: 96 2 kg (212 lb)  Vitals:    12/15/22 1627 12/15/22 2222 12/15/22 2223 12/16/22 0851   BP: 102/58 118/67 118/67 114/65   Pulse: 80 79 77 98   Resp:  16 16    Temp: 97 5 °F (36 4 °C) (!) 97 3 °F (36 3 °C) (!) 97 3 °F (36 3 °C) (!) 97 3 °F (36 3 °C)   TempSrc:       SpO2: 95% 98% 97% 95%   Weight:       Height:           Intake/Output Summary (Last 24 hours) at 12/16/2022 1130  Last data filed at 12/16/2022 0901  Gross per 24 hour Intake 240 ml   Output 2495 ml   Net -2255 ml     General: conscious, cooperative, no distress  Skin: dry  Eyes: pink conjunctivae  ENT: moist mucous membranes  Respiratory: equal chest expansion, clear breath sounds  Cardiovascular: distinct heart sounds, normal rate, regular rhythm, no rub  Abdomen: soft, non tender, non distended, normal bowel sounds  Extremities: no edema  Genitourinary: (+) bilateral PCN  Neuro: awake, alert     Psych: appropriate affect    Medications:    Current Facility-Administered Medications:   •  acetaminophen (TYLENOL) tablet 650 mg, 650 mg, Oral, Q6H PRN, Jacy Stiles MD, 650 mg at 12/13/22 2229  •  ARIPiprazole (ABILIFY) tablet 2 mg, 2 mg, Oral, Daily, Jacy Stiles MD, 2 mg at 12/16/22 0252  •  azelastine (ASTELIN) 0 1 % nasal spray 1 spray, 1 spray, Each Nare, BID, Jacy Stiles MD, 1 spray at 12/13/22 1218  •  bimatoprost (LUMIGAN) 0 01 % ophthalmic solution 1 drop, 1 drop, Both Eyes, HS, Jacy Stiles MD, 1 drop at 12/15/22 2224  •  calcitriol (ROCALTROL) capsule 0 25 mcg, 0 25 mcg, Oral, Daily, Jacy Stiles MD, 0 25 mcg at 12/16/22 7104  •  cefTRIAXone (ROCEPHIN) 1,000 mg in dextrose 5 % 50 mL IVPB, 1,000 mg, Intravenous, Q24H, Hetul Cody, DO, Last Rate: 100 mL/hr at 12/15/22 1749, 1,000 mg at 12/15/22 1749  •  DULoxetine (CYMBALTA) delayed release capsule 30 mg, 30 mg, Oral, Daily, Jacy Stiles MD, 30 mg at 12/16/22 2601  •  ezetimibe (ZETIA) tablet 10 mg, 10 mg, Oral, Daily, Jacy Stiles MD, 10 mg at 12/16/22 1829  •  ferrous sulfate tablet 325 mg, 325 mg, Oral, Daily With Breakfast, Jacy Stiles MD, 325 mg at 12/16/22 3147  •  fluticasone (FLONASE) 50 mcg/act nasal spray 1 spray, 1 spray, Nasal, Daily, Jacy Stiles MD, 1 spray at 12/13/22 1218  •  gabapentin (NEURONTIN) capsule 300 mg, 300 mg, Oral, HS, Jacy Stiles MD, 300 mg at 12/15/22 2227  •  HYDROmorphone (DILAUDID) injection 0 5 mg, 0 5 mg, Intravenous, Q4H PRN, Flori Pizano MD, 0 5 mg at 12/15/22 2227  •  insulin glargine (LANTUS) subcutaneous injection 18 Units 0 18 mL, 18 Units, Subcutaneous, HS, Flori Pizano MD, 18 Units at 12/15/22 2224  •  insulin lispro (HumaLOG) 100 units/mL subcutaneous injection 1-5 Units, 1-5 Units, Subcutaneous, HS, Flori Pizano MD, 1 Units at 12/15/22 2224  •  insulin lispro (HumaLOG) 100 units/mL subcutaneous injection 1-6 Units, 1-6 Units, Subcutaneous, TID AC, 2 Units at 12/15/22 1746 **AND** Fingerstick Glucose (POCT), , , TID AC, Flori Pizano MD  •  insulin lispro (HumaLOG) 100 units/mL subcutaneous injection 5 Units, 5 Units, Subcutaneous, TID With Meals, Flori Pizano MD, 5 Units at 12/15/22 1747  •  isosorbide mononitrate (IMDUR) 24 hr tablet 30 mg, 30 mg, Oral, QAM, Flori Pizano MD, 30 mg at 12/16/22 9724  •  metoprolol succinate (TOPROL-XL) 24 hr tablet 100 mg, 100 mg, Oral, Daily, Flori Pizano MD, 100 mg at 12/16/22 6402  •  ondansetron (ZOFRAN) injection 4 mg, 4 mg, Intravenous, Q6H PRN, Flori Pizano MD  •  oxybutynin (DITROPAN-XL) 24 hr tablet 10 mg, 10 mg, Oral, Daily, Flori Pizano MD, 10 mg at 12/16/22 7870  •  oxyCODONE (ROXICODONE) immediate release tablet 10 mg, 10 mg, Oral, Q6H PRN, Flori Pizano MD, 10 mg at 12/16/22 0550  •  oxyCODONE (ROXICODONE) IR tablet 5 mg, 5 mg, Oral, Q6H PRN, Flori Pizano MD, 5 mg at 12/13/22 2006  •  pantoprazole (PROTONIX) EC tablet 40 mg, 40 mg, Oral, Early Morning, Flori Pizano MD, 40 mg at 12/16/22 0550  •  polyethylene glycol (MIRALAX) packet 17 g, 17 g, Oral, Daily, Flori Pizano MD, 17 g at 12/16/22 7787  •  senna (SENOKOT) tablet 17 2 mg, 2 tablet, Oral, BID, Flori Pizano MD, 17 2 mg at 12/16/22 6358  •  tamsulosin (FLOMAX) capsule 0 4 mg, 0 4 mg, Oral, Daily With Dinner, Flori Pizano MD, 0 4 mg at 12/15/22 7557  •  zolpidem (AMBIEN) tablet 5 mg, 5 mg, Oral, HS PRN, Sanya Oviedo MD, 5 mg at 12/15/22 2227    Laboratory Results:  Results from last 7 days   Lab Units 12/16/22  0548 12/15/22  0539 12/14/22  1646 12/14/22  0610 12/13/22  0456 12/12/22  1358   WBC Thousand/uL  --  11 67*  --  9 31 12 95* 16 61*   HEMOGLOBIN g/dL  --  7 7* 7 1* 7 0* 7 9* 8 5*   HEMATOCRIT %  --  24 0* 23 8* 22 1* 24 4* 26 7*   PLATELETS Thousands/uL  --  237  --  230 235 247   POTASSIUM mmol/L 4 1 3 9  --  4 0 3 8 3 7   CHLORIDE mmol/L 108 106  --  106 105 102   CO2 mmol/L 24 24  --  23 22 20*   BUN mg/dL 50* 49*  --  53* 50* 51*   CREATININE mg/dL 3 35* 3 48*  --  3 80* 3 46* 3 57*   CALCIUM mg/dL 8 6 9 0  --  9 0 9 1 9 4

## 2022-12-16 NOTE — ASSESSMENT & PLAN NOTE
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  It is unclear how exactly patient slept, but may have been in a position for an extended period of time which placed pressure on the right radial nerve  Patient was also found to have the blood pressure cuff on the right upper arm  Plan:  -As patient noted to be leaning toward his right side in bed, nursing asked to reposition to try to keep pressure off the right upper extremity  Also requested that his blood pressure cuff be moved to the left upper extremity   -Will ask OT to evaluate patient, provide wrist splint on the right   -Outpatient EMG/NCS of the right upper extremity in 3 weeks if little/no improvement   -No neuroimaging necessary unless attending neurologist feels otherwise

## 2022-12-16 NOTE — CONSULTS
Consultation - Neurology   Sommer Jung 76 y o  male MRN: [id number]  Unit/Bed#: [facility] 917-01 Encounter: [id number]      Assessment/Plan     Right wrist drop  Assessment & Plan  39-year-old male with bladder cancer, lung cancer, bilateral nephrostomy tubes, CAD, hypertension, diabetes, peripheral neuropathy, dyslipidemia, CKD, PAD, depression, presents with inability to extend the right wrist and fingers of the right hand  Patient was admitted on 12/12/2022 with generalized weakness and hematuria  He awoke the morning of 12/16/2022 and discovered he could not extend his right wrist with the fingers of the right hand, and felt the last 3 fingers of the right hand felt as though they were "asleep"  Exam findings most consistent with a right radial nerve palsy  It is unclear how exactly patient slept, but may have been in a position for an extended period of time which placed pressure on the right radial nerve  Patient was also found to have the blood pressure cuff on the right upper arm  Plan:  -As patient noted to be leaning toward his right side in bed, nursing asked to reposition to try to keep pressure off the right upper extremity  Also requested that his blood pressure cuff be moved to the left upper extremity   -Will ask OT to evaluate patient, provide wrist splint on the right   -Outpatient EMG/NCS of the right upper extremity in 3 weeks if little/no improvement   -No neuroimaging necessary unless attending neurologist feels otherwise  UTI (urinary tract infection)  Assessment & Plan  Initially treated with cefepime, but transition to ceftriaxone  Management as per Urology/primary service  * Hematuria  Assessment & Plan  Patient currently undergoing bladder irrigation  Treatment as per Urology  Sommer Jung will need follow up in in 6 weeks with general attending or advance practitioner  He will require a EMG/NCS within 3 weeks      History of Present Illness     Reason for Consult / New office visit  03/14/22  Referred by PCP Guicho Rome DO      Chief Complaint   Patient presents with   • Office Visit   • Follow-up   • Seizures       Interim History 03/14/2022:  Patient is present with his care taker for seizure follow up. Per care taker no seizure since last visit. Per care taker he still has a imbalance gait when walking. Since last visit no reports of staring spells, convulsive seizures or waking up with tongue bite or urinary incontinence since last visit. He is compliant with all seizure medications. No reported side effect to AEDs. No witnessed seizure per caretaker since last visit.       Interim History 11/4/2021:  Patient is present with his care taker for seizure follow up. Per care taker his gait improved since last visit. No reports of staring spells, convulsive seizures or waking up with tongue bite or urinary incontinence since last visit. He is compliant with all seizure medications. No reported side effect to AEDs. No witnessed seizure per caretaker since last visit.     HPI:    Edson Zelaya is a 49 year old male with a PMH of developmental delay, non verbal and epilepsy who presents for evaluation of seizures.  Seizure started since his childhood and most recent seizure was May 2021. He reports to having 1 seizure per month. He is not able to tell any aura. He dose not have his care giver with him today and not able to tell the semiology of his seizures. However no clear exacerbation of his seizure or increase of frequency. Patient has ataxic gait mostly noted when walking up stairs he was prescribed walking as \"drunk\" in previous notes.   He is on multiple seizure medications including LAC , LEV , OXC and VNS.         Past Medical History:   Diagnosis Date   • Essential (primary) hypertension    • No known problems          Past Surgical History:   Procedure Laterality Date   • Vagus nerve stimulator insertion         Outpatient Medications Marked as Taking for the  3/14/22 encounter (Office Visit) with Sarah Nolan MD   Medication Sig Dispense Refill   • oxcarbazepine (TRILEPTAL) 600 MG tablet      • HYDROcodone-acetaminophen (NORCO) 5-325 MG per tablet TAKE 1 TABLET BY MOUTH EVERY 6 HOURS AS NEEDED FOR MODERATE PAIN     • folic acid (FOLATE) 1 MG tablet 1 mg.     • pantoprazole (PROTONIX) 40 MG tablet 40 mg.     • triamterene-hydroCHLOROthiazide (MAXZIDE-25) 37.5-25 MG per tablet Take 1 tablet by mouth.     • levETIRAcetam (KepPRA) 500 MG tablet Take 1,500 mg by mouth every 12 hours.      • Vimpat 200 MG Tab 200 mg 2 times daily.  0   • phenylephrine (SUDAFED PE) 10 MG Tab 10 mg.     • lactulose (KRISTALOSE) 10 g packet      • acetaminophen (TYLENOL) 325 MG tablet Take 650 mg by mouth every 4 hours as needed.     • amLODIPine (NORVASC) 10 MG tablet Take 10 mg by mouth.     • aspirin (ASPIR-81) 81 MG EC tablet Take 81 mg by mouth.     • docusate sodium (COLACE) 100 MG capsule Take 100 mg by mouth.     • fenofibrate micronized (LOFIBRA) 134 MG capsule Take 134 mg by mouth.     • gabapentin (NEURONTIN) 600 MG tablet Take 600 mg by mouth.     • lactulose (CHRONULAC) 10 GM/15ML solution Take 20 g by mouth.         ALLERGIES:  No Known Allergies      Family History   Family history unknown: Yes         Social History     Tobacco Use   Smoking Status Never Smoker   Smokeless Tobacco Never Used       Social History     Substance and Sexual Activity   Alcohol Use No       Social History     Substance and Sexual Activity   Drug Use No         ROS:  Gen: no fever, no chills, no changes in weight  Skin: no rashes, no easy bruising  HEENT: no double vision, no blurry vision, no decreased hearing, no tinnitus, no dysphagia, no lumps in the neck  CV: no chest pain, no palpitations  Pulm: no SOB  GI: no N/V/D/C  : no hematuria, no bloody stool  MSK: no swelling  Neuro: + seizure , none verbal   Psych: no depression, no anxiety      Exam:  Visit Vitals  Ht 5' 8\" (1.727 m)   Wt 72.6  Principal Problem: R wrist drop  Hx and PE limited by: n/a  HPI: Danita Aranda is a 76 y o  left handed male with bladder cancer, lung cancer, bilateral nephrostomy tubes, CAD, hypertension, diabetes, peripheral neuropathy, dyslipidemia, CKD, PAD, depression, presents with inability to extend the right wrist and fingers of the right hand  Patient was admitted for hematuria and generalized weakness and is being followed closely by Urology  He is currently being treated with nephrostomy tube flushes and ceftriaxone for UTI  Patient states he awoke early this morning (12/16/2022) and felt like the last 3 fingers on his right hand were "numb" (he describes the feeling as if they fell asleep), and he was unable to extend his right wrist or the fingers on his right hand  He is not sure if he slept in a particular position overnight  He currently denies any right humeral discomfort but did tell other providers that he had some discomfort at the right humerus  He also notes he had several falls around [facility] due to generalized weakness and fell on each of his shoulders and does have bilateral shoulder pain  He denies new weakness or numbness elsewhere, difficulty speaking, visual change, but does have chronic bilateral lower extremity weakness for which he uses a wheeled walker for ambulatory security  He also has chronic bilateral foot numbness related to his neuropathy  Patient denies prior history of stroke but states his wife told him he may have had a "mini stroke" years ago, but he cannot recall the circumstances surrounding that diagnosis  Neurologic exam significant for inability to extend the right wrist or the fingers on the right hand and some mild sensory loss of the third, fourth, and fifth fingers on the right    Patient also has some mild weakness of the bilateral lower extremities, symmetrically, and some sensory loss in the bilateral distal lower extremities in a stocking distribution  Inpatient consult to Neurology  Consult performed by: An Motta PA-C  Consult ordered by: Rose Salmeron PA-C          Review of Systems   Constitutional: Negative  HENT: Negative  Eyes: Negative  Respiratory: Negative  Cardiovascular: Negative  Gastrointestinal: Negative  Endocrine: Negative  Genitourinary: Negative  Musculoskeletal: Positive for arthralgias  Skin: Negative for rash  Allergic/Immunologic: Negative  Neurological: Positive for weakness and numbness  As above  Hematological: Negative  Psychiatric/Behavioral: Negative  Historical Information   Past Medical History:   Diagnosis Date   • Anemia     Last assessed: 9/28/17   • Anxiety    • Arteriosclerotic cardiovascular disease     Last assessed: 9/28/17   • Arthritis    • Bladder cancer ([facility] )     bladder- had BCG treatments   • Chronic kidney disease     Stage IV   • CKD (chronic kidney disease) stage 4, GFR 15-29 ml/min ([facility])    • Colon polyp    • Coronary artery disease     7 stents   • Depression    • Diabetes mellitus ([facility])     IDDM   • GERD (gastroesophageal reflux disease)    • Glaucoma    • Hematuria    • History of fusion of cervical spine    • Hyperlipidemia    • Hypertension    • Insomnia     Last assessed: 11/14/12   • Loss of hearing     has hearing aids but usually does not wear them   • Lung cancer ([facility] )    • Metastatic cancer ([facility] )    • Other seasonal allergic rhinitis     Last assessed: 2/10/16   • PAD (peripheral artery disease) ([facility])    • Shortness of breath     on exertion   • Spinal stenosis of lumbar region    • Transient cerebral ischemia     No Residual   • Uses walker     w/c for longer distances     Past Surgical History:   Procedure Laterality Date   • CARDIAC SURGERY      Cath stent placement  Last assessed: 3/9/17  Interventional Catheterization   • CHOLECYSTECTOMY     • COLONOSCOPY     • CYSTOSCOPY      Diagnostic w/biopsy  Jorge Gómez  kg (160 lb)   BMI 24.33 kg/m²     Gen: sitting in chair in no distress  Skin: no rashes  HEENT: neck supple, no hoarse voice  CV: RRR, no murmurs  Pulm: breathing comfortably on room air  MSK: no edema  Psych: normal affect  Neuro:  MS: awake non verbal not oriented, dose not follow commands.  CNs:   2 - visual fields blinks to threats   3, 4 and 6 -   PERRLA  7 - no facial asymmetry   8 - hearing and balance intact  11 - shoulder shrug   12- Tongue midline   Motor:moves all extremities against gravity   Sensory: Light touch intact b/l in UE and LE.  Reflexes: 2+ and symmetric throughout. Negative Linares b/l.   Gait: wide base and short stride   Coordination: No tremor.        Assessment:   Edson Zelaya is a 48 year old male with a PMH of developmental delay, non verbal and epilepsy who presents for evaluation of seizures. He is on multiple AEDs and VNS. The setting of his VNS are very low today.     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 Ammonia on 12/2/21 71 elevated. /2/22: unremarkable. CMP 12/2/21: unremarkable. Ca on 12/2/21: 9.9    I reviewed lipid panel from 12/2/21 with elevated cholesterol of 206 LDL was 125  - The setting of VNS was adjusted as above.   - RTC in 4 mnts     Total time spent during the encounter  41 minutes  More than 50% of the time spent on patient education and preventive care.     Sarah Nolan MD  Neurology- Epilepsy    Last assessed: 12/1/14   • CYSTOSCOPY N/A 04/12/2022    Procedure: CYSTOSCOPY  Bladder biopsies  ;  Surgeon: Ada Daniel MD;  [facility];  Service: Urology   • CYSTOSCOPY W/ RETROGRADES Right 03/01/2022    Procedure: CYSTO; stent removal retrograde;  Surgeon: Ada Daniel MD;  [facility];  Service: Urology   • CYSTOSCOPY W/ URETERAL STENT PLACEMENT Bilateral 10/18/2021    Procedure: bilateral retrogrades, cytology collection;  Surgeon: Ada Daniel MD;  [facility];  Service: Urology   • CYSTOURETHROSCOPY      w/cautery  Maurice Jorge   • FL RETROGRADE PYELOGRAM  10/18/2021   • FL RETROGRADE PYELOGRAM  10/24/2021   • FL RETROGRADE PYELOGRAM  12/14/2022   • GASTRIC BYPASS      For morbid obesity w/Shaji-en-Y   Resolved: 11/17/09   • INCISION AND DRAINAGE OF WOUND Right 02/26/2017    Procedure: INCISION AND DRAINAGE (I&D) EXTREMITY WITH APPLICATION OF GRAFT JACKET;  Surgeon: Livia Anguiano DPM;  [facility];  Service:    • INCISION AND DRAINAGE OF WOUND Right 04/25/2017    Procedure: INCISION AND DRAINAGE (I&D) EXTREMITY, APPLICATION OF GRAFT;  Surgeon: Livia Anguiano DPM;  [facility];  Service:    • IR BIOPSY OTHER  07/02/2020   • IR LOWER EXTREMITY ANGIOGRAM  02/08/2021   • IR LOWER EXTREMITY ANGIOGRAM  02/11/2021   • IR NEPHROSTOMY TUBE CHECK/CHANGE/REPOSITION/REINSERTION/UPSIZE  04/28/2022   • IR NEPHROSTOMY TUBE CHECK/CHANGE/REPOSITION/REINSERTION/UPSIZE  05/24/2022   • IR NEPHROSTOMY TUBE CHECK/CHANGE/REPOSITION/REINSERTION/UPSIZE  06/07/2022   • IR NEPHROSTOMY TUBE CHECK/CHANGE/REPOSITION/REINSERTION/UPSIZE  07/28/2022   • IR NEPHROSTOMY TUBE CHECK/CHANGE/REPOSITION/REINSERTION/UPSIZE  11/15/2022   • IR NEPHROSTOMY TUBE PLACEMENT  02/25/2022   • IR PORT PLACEMENT  01/17/2022   • IR THORACENTESIS  09/02/2022   • IR THORACENTESIS  09/14/2022   • IR THORACENTESIS WITH TUBE PLACEMENT Left     october   • IR TUNNELED CENTRAL LINE PLACEMENT  12/24/2020 • JOINT REPLACEMENT      christofer knees replaced   • WI AMPUTATION METATARSAL+TOE,SINGLE Left 12/21/2020    Procedure: RAY RESECTION FOOT;  Surgeon: Brad Olivia DPM;  [facility];  Service: Podiatry   • WI AMPUTATION METATARSAL+TOE,SINGLE Left 12/31/2020    Procedure: 5TH MET RESECTION;  Surgeon: Brad Olivia DPM;  [facility];  Service: Podiatry   • WI CYSTOURETHROSCOPY W/IRRIG & EVAC CLOTS N/A 02/10/2021    Procedure: CYSTOSCOPY EVACUATION OF CLOTS, fulguration;  Surgeon: Brandan Mcqueen MD;  [facility];  Service: Urology   • WI CYSTOURETHROSCOPY W/IRRIG & EVAC CLOTS N/A 10/24/2021    Procedure: CYSTOSCOPY EVACUATION OF CLOT, fulguration of bleeding vessels, right ureter stent placement, retrograde pyelogram;  Surgeon: Atilio Parekh MD;  [facility];  Service: Urology   • WI CYSTOURETHROSCOPY,BIOPSY N/A 08/16/2016    Procedure: Cevivek Max;  Surgeon: Drea Metcalf MD;  [facility];  Service: Urology   • WI CYSTOURETHROSCOPY,FULGUR <0 5 CM LESN N/A 11/19/2020    Procedure: CYSTO W/BIOPSIES, transurethral prostate bx;  Surgeon: Tierney Espitia MD;  [facility];  Service: Urology   • WI CYSTOURETHROSCOPY,FULGUR >5 CM LESN Bilateral 10/18/2021    Procedure: TRANSURETHRAL RESECTION OF BLADDER TUMOR (TURBT);   Surgeon: Essence Gary MD;  [facility];  Service: Urology   • WI CYSTOURETHROSCOPY,URETER CATHETER Right 12/14/2022    Procedure: CYSTOSCOPY WITH RETROGRADE PYELOGRAM and CLOT EVACUATION, RIGHT STENT INSERTION, FULGRATION OF BLEEDING POINTS;  Surgeon: Bob Pritchard MD;  [facility];  Service: Urology   • WI Magalie Kulkarni 3RD+ ORD SLCTV ABDL PEL/LXTR [facility] Left 02/08/2021    Procedure: LEG angiogram, CO2 w/limited contrast with balloon angioplasty postertior tibial artery;  Surgeon: Bk Rolle MD;  [facility];  Service: Vascular   • ROTATOR CUFF REPAIR     • SMALL INTESTINE SURGERY      Surgery Shaji-en-Y • SPINAL FUSION      lumbar and cervical fusions   • VAC DRESSING APPLICATION Right     Procedure: APPLICATION VAC DRESSING;  Surgeon: Britney Dewitt DPM;  [facility];  Service:    • WOUND DEBRIDEMENT Left 2021    Procedure: FOOT DEBRIDE, 8 Maynore Qiunten Labidi OUT w/graft application;  Surgeon: Britney Dewitt DPM;  [facility];  Service: Podiatry     Social History   Social History     Substance and Sexual Activity   Alcohol Use Never    Comment: beer / liquor     Social History     Substance and Sexual Activity   Drug Use Not Currently   • Types: Marijuana    Comment: quit 2019 had medical marijuana     E-Cigarette/Vaping   • E-Cigarette Use Never User      E-Cigarette/Vaping Substances   • Nicotine No    • THC No    • CBD No    • Flavoring No    • Other No    • Unknown No      Social History     Tobacco Use   Smoking Status Former   • Packs/day: 3 00   • Years: 27 00   • Pack years: 81 00   • Types: Cigarettes   • Quit date: 5   • Years since quittin 9   Smokeless Tobacco Never     Family History:   Family History   Problem Relation Age of Onset   • Diabetes Mother    • Heart disease Mother    • Other Mother         High blood pressure   • Heart disease Father    • Diabetes Sister    • Other Sister         High blood pressure   • Kidney disease Sister    • Heart disease Brother    • Other Brother         High blood pressure       Review of previous medical records was completed       Meds/Allergies   current meds:   Current Facility-Administered Medications   Medication Dose Route Frequency   • acetaminophen (TYLENOL) tablet 650 mg  650 mg Oral Q6H PRN   • ARIPiprazole (ABILIFY) tablet 2 mg  2 mg Oral Daily   • azelastine (ASTELIN) 0 1 % nasal spray 1 spray  1 spray Each Nare BID   • bimatoprost (LUMIGAN) 0 01 % ophthalmic solution 1 drop  1 drop Both Eyes HS   • calcitriol (ROCALTROL) capsule 0 25 mcg  0 25 mcg Oral Daily   • cefTRIAXone (ROCEPHIN) 1,000 mg in dextrose 5 % 50 mL IVPB  1,000 mg Intravenous Q24H   • DULoxetine (CYMBALTA) delayed release capsule 30 mg  30 mg Oral Daily   • ezetimibe (ZETIA) tablet 10 mg  10 mg Oral Daily   • ferrous sulfate tablet 325 mg  325 mg Oral Daily With Breakfast   • fluticasone (FLONASE) 50 mcg/act nasal spray 1 spray  1 spray Nasal Daily   • gabapentin (NEURONTIN) capsule 300 mg  300 mg Oral HS   • HYDROmorphone (DILAUDID) injection 0 5 mg  0 5 mg Intravenous Q4H PRN   • insulin glargine (LANTUS) subcutaneous injection 18 Units 0 18 mL  18 Units Subcutaneous HS   • insulin lispro (HumaLOG) 100 units/mL subcutaneous injection 1-5 Units  1-5 Units Subcutaneous HS   • insulin lispro (HumaLOG) 100 units/mL subcutaneous injection 1-6 Units  1-6 Units Subcutaneous TID AC   • insulin lispro (HumaLOG) 100 units/mL subcutaneous injection 5 Units  5 Units Subcutaneous TID With Meals   • isosorbide mononitrate (IMDUR) 24 hr tablet 30 mg  30 mg Oral QAM   • metoprolol succinate (TOPROL-XL) 24 hr tablet 100 mg  100 mg Oral Daily   • ondansetron (ZOFRAN) injection 4 mg  4 mg Intravenous Q6H PRN   • oxybutynin (DITROPAN-XL) 24 hr tablet 10 mg  10 mg Oral Daily   • oxyCODONE (ROXICODONE) immediate release tablet 10 mg  10 mg Oral Q6H PRN   • oxyCODONE (ROXICODONE) IR tablet 5 mg  5 mg Oral Q6H PRN   • pantoprazole (PROTONIX) EC tablet 40 mg  40 mg Oral Early Morning   • polyethylene glycol (MIRALAX) packet 17 g  17 g Oral Daily   • senna (SENOKOT) tablet 17 2 mg  2 tablet Oral BID   • tamsulosin (FLOMAX) capsule 0 4 mg  0 4 mg Oral Daily With Dinner   • zolpidem (AMBIEN) tablet 5 mg  5 mg Oral HS PRN       Allergies   Allergen Reactions   • Atorvastatin Hives, Itching and Rash   • Simvastatin Rash and Edema     Edema of lower legs   • Statins Hives and Itching   • Insulin Lispro Swelling and Edema     " Lower Legs"   • Other Itching, Rash and Other (See Comments)     "EKG Patches"   "blue EKG patches"       Objective   Vitals:Blood pressure 115/66, pulse 94, temperature 97 5 °F (36 4 °C), resp  rate 16, height 6' 3" (1 905 m), weight 96 2 kg (212 lb), SpO2 95 %  ,Body mass index is 26 5 kg/m²  Intake/Output Summary (Last 24 hours) at 12/16/2022 1608  Last data filed at 12/16/2022 1251  Gross per 24 hour   Intake 100 ml   Output 2495 ml   Net -2395 ml       Invasive Devices: Invasive Devices     Central Venous Catheter Line  Duration           Port A Cath Right Chest -- days          Peripheral Intravenous Line  Duration           Peripheral IV 12/13/22 Left Antecubital 2 days          Drain  Duration           Nephrostomy Left 10 2 Fr  31 days    Nephrostomy Right 10 2 Fr  31 days    Continuous Bladder Irrigation Three-way 2 days                Physical Exam   General:  Patient is well-developed, well-nourished, and in no acute distress  HEENT:  Head normocephalic  Eyes anicteric  Cardiovascular:  With regular rhythm  Lungs:  Normal effort  Nonlabored breathing  Extremities:  With no significant edema  Atrophy of the intrinsic hand muscles noted, R>L  Skin: No rashes  Neurologic Exam  Neurologic:  Patient is alert, pleasantly interactive, and appropriately conversational   No obvious symbolic language difficulty or dysarthria, and the patient is fully oriented  Gait deferred for safety  Cranial Nerves:   II: Visual fields full to confrontation  Pupils equal, round, reactive to light with normal accomodation  Cannot visualize optic fundi  III,IV,VI: extraocular movements intact with no nystagmus  R>L ptosis noted  V: Sensation in the V1 through V3 distributions intact to light touch bilaterally  VII: Face is symmetric with no weakness noted except ptosis as noted above  XII: Tongue midline with no atrophy or fasciculations with appropriate movement  Coordination:  Accurate with finger-to-nose maneuvers bilaterally      Motor testing with 0/5 right wrist extension, 4-/5 right wrist flexion, 2/5 right finger extension, 4+/5  strength, 5/5 biceps flexion and triceps extension, and 4+/5 right shoulder abduction  Full strength noted left upper extremity  4/5 bilateral hip flexion, 4/5 bilateral knee flexion and extension, 4/5 right dorsiflexion and plantarflexion, 4-/5 left dorsiflexion and 4/5 left plantar flexion  Sensory testing with diminished light touch appreciation of the right third, fourth, and fifth fingers, as well as of the distal lower extremities in a stocking distribution (feet)  Tinel's test negative at the right elbow and right wrist   No tenderness to palpation elicited with palpation of the right radial nerve  Diffusely hyporeflexic throughout  Toe responses are mute bilaterally  Lab Results:   CBC:   Results from last 7 days   Lab Units 12/15/22  0539 12/14/22  1646 12/14/22  0610 12/13/22  0456   WBC Thousand/uL 11 67*  --  9 31 12 95*   RBC Million/uL 2 50*  --  2 30* 2 53*   HEMOGLOBIN g/dL 7 7* 7 1* 7 0* 7 9*   HEMATOCRIT % 24 0* 23 8* 22 1* 24 4*   MCV fL 96  --  96 96   PLATELETS Thousands/uL 237  --  230 235   , BMP/CMP:   Results from last 7 days   Lab Units 12/16/22  0548 12/15/22  0539 12/14/22  0610 12/13/22  0456 12/12/22  1358   SODIUM mmol/L 139 137 135   < > 131*   POTASSIUM mmol/L 4 1 3 9 4 0   < > 3 7   CHLORIDE mmol/L 108 106 106   < > 102   CO2 mmol/L 24 24 23   < > 20*   BUN mg/dL 50* 49* 53*   < > 51*   CREATININE mg/dL 3 35* 3 48* 3 80*   < > 3 57*   CALCIUM mg/dL 8 6 9 0 9 0   < > 9 4   AST U/L  --   --   --   --  19   ALT U/L  --   --   --   --  21   ALK PHOS U/L  --   --   --   --  114   EGFR ml/min/1 73sq m 16 15 14   < > 15    < > = values in this interval not displayed  Imaging Studies: I have personally reviewed pertinent reports  EKG, Pathology, and Other Studies: I have personally reviewed pertinent reports      VTE Prophylaxis: Sequential compression device Lars Sweeney)     Code Status: Level 1 - Full Code  Advance Directive and Living Will:      Power of :    POLST:

## 2022-12-16 NOTE — ASSESSMENT & PLAN NOTE
· Maintain indwelling Shirley catheter and continue to titrate CBI to off  Urine output improving  · Maintain bilateral PCN, and continue daily flushes  · maintain right ureteral stent, and exchange with possible fulguration/TURBT in 3 months  Our office will arrange outpatient follow-up    · Continue to monitor renal function, creatinine slowly trending downward, now 3 35  nephrology following  · Monitor H&H  · Medical optimization per primary team     Urology will continue to follow

## 2022-12-16 NOTE — CASE MANAGEMENT
Case Management Discharge Planning Note    Patient name Rah Henry  Location [facility] 917/[facility] 194-61 MRN [id number]  : 1945 Date 2022       Current Admission Date: 2022  Current Admission Diagnosis:Hematuria   Patient Active Problem List    Diagnosis Date Noted   • Radial nerve palsy, right 2022   • Recurrent left pleural effusion 2022   • UTI (urinary tract infection) 2022   • Shortness of breath 2022   • History of tobacco use disorder 2022   • Retroperitoneal lymphadenopathy 2022   • Pulmonary nodules 2022   • Syncope and collapse 2022   • Depression with suicidal ideation 2022   • Cancer related pain 2022   • Bilateral hydronephrosis 04/10/2022   • CKD (chronic kidney disease) 2022   • Fall 2022   • Hyperkalemia 2022   • Retained ureteral stent    • 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[address] ) 2022   • Embolism and 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 ([address] ) 2022   • Abnormal CT scan, bladder 2022   • Port-A-Cath in place 2022   • Continuous opioid dependence ([address] ) 2021   • Hematuria 10/24/2021   • Acute urinary retention 10/21/2021   • Chronic pain syndrome 2021   • Lumbar spondylosis    • Chronic bronchitis ([address] ) 2021   • Moderate major depression, single episode ([address] ) 2021   • Thrombocytopenia ([address] ) 2021   • 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 2021   • Gross hematuria 2021   • PAD (peripheral artery disease) ([facility])    • Left carotid bruit    • Anemia of chronic disease 2020   • Preoperative clearance 2020   • Chronic anemia 10/10/2020   • Diabetic ulcer of left foot associated with type 2 diabetes mellitus, 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 ([address] ) 10/08/2020   • Acute kidney injury superimposed on CKD [facility])    • Dysuria 2020   • Diabetic polyneuropathy associated with type 2 diabetes mellitus ([facility] ) 07/16/2020   • Abnormal MRI, lumbar spine 06/29/2020   • Malignant neoplasm of anterior wall of urinary bladder ([facility] )    • Acute renal failure (ARF) ([facility] ) 02/12/2020   • Secondary renal hyperparathyroidism ([facility] ) 02/12/2020   • Preop examination 04/16/2019   • Superficial phlebitis 03/07/2019   • Arteriosclerosis of artery of extremity ([facility] ) 02/22/2019   • Stable angina pectoris ([facility] ) 02/22/2019   • Herpes zoster without complication 83/57/9354   • Localized edema 02/22/2019   • Back pain 01/31/2019   • Degeneration of lumbar intervertebral disc 12/03/2018   • Primary osteoarthritis of left knee 03/16/2018   • Bladder carcinoma ([facility] ) 08/16/2016   • Presence of stent in coronary artery 08/16/2016   • Hypertension with renal disease 10/29/2015   • Hyperlipidemia 10/29/2015   • Cystitis due to intravesical BCG administration 09/24/2015   • Coronary artery disease of native artery of native heart with stable angina pectoris ([facility] ) 05/19/2011   • Type 2 diabetes mellitus, with long-term current use of insulin ([facility] ) 01/09/2004      LOS (days): 4  Geometric Mean LOS (GMLOS) (days): 3 00  Days to GMLOS:-0 9     OBJECTIVE:  Risk of Unplanned Readmission Score: 55 03         Current admission status: Inpatient   Preferred Pharmacy:   [address] [place] [address]  Phone: [phone] Fax: [phone]    Primary Care Provider: Mesfin Freedman MD    Primary Insurance: MEDICARE  Secondary Insurance: BLUE CROSS    DISCHARGE DETAILS:               Other Referral/Resources/Interventions Provided:  Referral Comments: Patient is now being recommended for STR upon time of discharge  [facility] list of referrals sent at this time  Currently awaiting accepting facility           Treatment Team Recommendation: Short Term Rehab  Discharge Destination Plan[de-identified] Short Term Rehab

## 2022-12-16 NOTE — ASSESSMENT & PLAN NOTE
Initially treated with cefepime, but transition to ceftriaxone  Management as per Urology/primary service

## 2022-12-16 NOTE — PHYSICAL THERAPY NOTE
Physical Therapy Evaluation     Patient's Name: Cluadio Dunbar    Admitting Diagnosis  Blood in urine [R31 9]  Bladder cancer ([facility] ) [C67 9]  Urinary tract infection [N39 0]  Hematuria [R31 9]    Problem List  Patient Active Problem List   Diagnosis    Coronary artery disease of native artery of native heart with stable angina pectoris ([facility] )    Bladder carcinoma ([facility] )    Hypertension with renal disease    Hyperlipidemia    Presence of stent in coronary artery    Type 2 diabetes mellitus, with long-term current use of insulin ([facility])    Primary osteoarthritis of left knee    Cystitis due to intravesical BCG administration    Degeneration of lumbar intervertebral disc    Back pain    Arteriosclerosis of artery of extremity ([facility])    Stable angina pectoris ([facility])    Herpes zoster without complication    Localized edema    Superficial phlebitis    Preop examination    Acute renal failure (ARF) ([facility])    Secondary renal hyperparathyroidism ([facility] )    Malignant neoplasm of anterior wall of urinary bladder ([facility])    Abnormal MRI, lumbar spine    Diabetic polyneuropathy associated with type 2 diabetes mellitus ([facility] )    Dysuria    Diabetic ulcer of left foot associated with type 2 diabetes mellitus, 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 ([facility] )    Acute kidney injury superimposed on CKD ([facility])    Chronic anemia    Anemia of chronic disease    Preoperative clearance    PAD (peripheral artery disease) ([facility])    Left carotid bruit    Gross hematuria    Chronic bronchitis ([facility])    Moderate major depression, single episode ([facility])    Thrombocytopenia ([facility])    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    Lumbar spondylosis    Chronic pain syndrome    Acute urinary retention    Hematuria    Continuous opioid dependence ([facility] )    Port-A-Cath in place    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[facility])    Embolism and 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 ([facility])    Abnormal CT scan, bladder    Retained ureteral stent    Fall    Hyperkalemia    CKD (chronic kidney disease)    Bilateral hydronephrosis    Cancer related pain    Syncope and collapse    Depression with suicidal ideation    Pulmonary nodules    Retroperitoneal lymphadenopathy    Shortness of breath    History of tobacco use disorder    Recurrent left pleural effusion    UTI (urinary tract infection)    Radial nerve palsy, right       Past Medical History  Past Medical History:   Diagnosis Date    Anemia     Last assessed: 9/28/17    Anxiety     Arteriosclerotic cardiovascular disease     Last assessed: 9/28/17    Arthritis     Bladder cancer ([facility] )     bladder- had BCG treatments    Chronic kidney disease     Stage IV    CKD (chronic kidney disease) stage 4, GFR 15-29 ml/min ([facility])     Colon polyp     Coronary artery disease     7 stents    Depression     Diabetes mellitus ([facility])     IDDM    GERD (gastroesophageal reflux disease)     Glaucoma     Hematuria     History of fusion of cervical spine     Hyperlipidemia     Hypertension     Insomnia     Last assessed: 11/14/12    Loss of hearing     has hearing aids but usually does not wear them    Lung cancer ([facility] )     Metastatic cancer ([facility] )     Other seasonal allergic rhinitis     Last assessed: 2/10/16    PAD (peripheral artery disease) ([facility])     Shortness of breath     on exertion    Spinal stenosis of lumbar region     Transient cerebral ischemia     No Residual    Uses walker     w/c for longer distances       Past Surgical History  Past Surgical History:   Procedure Laterality Date    CARDIAC SURGERY      Cath stent placement  Last assessed: 3/9/17  Interventional Catheterization    CHOLECYSTECTOMY      COLONOSCOPY      CYSTOSCOPY      Diagnostic w/biopsy  Geraldine Schlatter  Last assessed: 12/1/14    CYSTOSCOPY N/A 04/12/2022    Procedure: CYSTOSCOPY  Bladder biopsies  ;  Surgeon: Peace Mistry MD;  Location: [facility] OR;  Service: Urology    CYSTOSCOPY W/ RETROGRADES Right 03/01/2022    Procedure: CYSTO; stent removal retrograde;  Surgeon: Janak Avalos Brannon Tolbert MD;  [facility];  Service: Urology    CYSTOSCOPY W/ URETERAL STENT PLACEMENT Bilateral 10/18/2021    Procedure: bilateral retrogrades, cytology collection;  Surgeon: Temi Mendoza MD;  [facility];  Service: Urology    CYSTOURETHROSCOPY      w/cautery  Jorge Whitakers    FL RETROGRADE PYELOGRAM  10/18/2021    FL RETROGRADE PYELOGRAM  10/24/2021    FL RETROGRADE PYELOGRAM  12/14/2022    GASTRIC BYPASS      For morbid obesity w/Shaji-en-Y   Resolved: 11/17/09    INCISION AND DRAINAGE OF WOUND Right 02/26/2017    Procedure: INCISION AND DRAINAGE (I&D) EXTREMITY WITH APPLICATION OF GRAFT JACKET;  Surgeon: Padma Salgado DPM;  [facility];  Service:     INCISION AND DRAINAGE OF WOUND Right 04/25/2017    Procedure: INCISION AND DRAINAGE (I&D) EXTREMITY, APPLICATION OF GRAFT;  Surgeon: Padma Salgado DPM;  [facility];  Service:     IR BIOPSY OTHER  07/02/2020    IR LOWER EXTREMITY ANGIOGRAM  02/08/2021    IR LOWER EXTREMITY ANGIOGRAM  02/11/2021    IR NEPHROSTOMY TUBE CHECK/CHANGE/REPOSITION/REINSERTION/UPSIZE  04/28/2022    IR NEPHROSTOMY TUBE CHECK/CHANGE/REPOSITION/REINSERTION/UPSIZE  05/24/2022    IR NEPHROSTOMY TUBE CHECK/CHANGE/REPOSITION/REINSERTION/UPSIZE  06/07/2022    IR NEPHROSTOMY TUBE CHECK/CHANGE/REPOSITION/REINSERTION/UPSIZE  07/28/2022    IR NEPHROSTOMY TUBE CHECK/CHANGE/REPOSITION/REINSERTION/UPSIZE  11/15/2022    IR NEPHROSTOMY TUBE PLACEMENT  02/25/2022    IR PORT PLACEMENT  01/17/2022    IR THORACENTESIS  09/02/2022    IR THORACENTESIS  09/14/2022    IR THORACENTESIS WITH TUBE PLACEMENT Left     october    IR TUNNELED CENTRAL LINE PLACEMENT  12/24/2020    JOINT REPLACEMENT      christofer knees replaced    RI AMPUTATION METATARSAL+TOE,SINGLE Left 12/21/2020    Procedure: RAY RESECTION FOOT;  Surgeon: Isabel Ramires DPM;  [facility];  Service: Podiatry    RI AMPUTATION METATARSAL+TOE,SINGLE Left 12/31/2020    Procedure: 5TH MET RESECTION; Surgeon: Natacha Babcock DPM;  [facility];  Service: Podiatry    PA CYSTOURETHROSCOPY W/IRRIG & EVAC CLOTS N/A 02/10/2021    Procedure: CYSTOSCOPY EVACUATION OF CLOTS, fulguration;  Surgeon: Desiree Kelly MD;  [facility];  Service: Urology    PA CYSTOURETHROSCOPY W/IRRIG & EVAC CLOTS N/A 10/24/2021    Procedure: CYSTOSCOPY EVACUATION OF CLOT, fulguration of bleeding vessels, right ureter stent placement, retrograde pyelogram;  Surgeon: Tyrel Gomez MD;  [facility];  Service: Urology    PA CYSTOURETHROSCOPY,BIOPSY N/A 08/16/2016    Procedure: Lynne Munguia;  Surgeon: Vanda Gonzales MD;  [facility];  Service: Urology    PA CYSTOURETHROSCOPY,FULGUR <0 5 CM LESN N/A 11/19/2020    Procedure: CYSTO W/BIOPSIES, transurethral prostate bx;  Surgeon: Radha Campos MD;  [facility];  Service: Urology    PA CYSTOURETHROSCOPY,FULGUR >5 CM LESN Bilateral 10/18/2021    Procedure: TRANSURETHRAL RESECTION OF BLADDER TUMOR (TURBT);   Surgeon: Harry Breen MD;  [facility];  Service: Urology    PA CYSTOURETHROSCOPY,URETER CATHETER Right 12/14/2022    Procedure: CYSTOSCOPY WITH RETROGRADE PYELOGRAM and CLOT EVACUATION, RIGHT STENT INSERTION, FULGRATION OF BLEEDING POINTS;  Surgeon: Heidi Bay MD;  [facility];  Service: Urology    PA [facility] 3RD+ ORD [address] PEL/[facility] Left 02/08/2021    Procedure: LEG angiogram, CO2 w/limited contrast with balloon angioplasty postertior tibial artery;  Surgeon: Carolina Chakraborty MD;  [facility];  Service: Vascular    ROTATOR CUFF REPAIR      SMALL INTESTINE SURGERY      Surgery Shaji-en-Y    SPINAL FUSION      lumbar and cervical fusions    VAC DRESSING APPLICATION Right 44/87/4136    Procedure: APPLICATION VAC DRESSING;  Surgeon: Chet Gillis DPM;  [facility];  Service:     WOUND DEBRIDEMENT Left 02/16/2021    Procedure: FOOT DEBRIDE, [facility] OUT w/graft application;  Surgeon: Bishop Cheatham DPM;  [facility];  Service: Podiatry          12/16/22 4823   PT Last Visit   PT Visit Date 12/16/22   Note Type   Note type Evaluation   Pain Assessment   Pain Assessment Tool 0-10   Pain Score 6   Pain Location/Orientation Orientation: Right;Location: Arm;Orientation: Lower; Location: Back   Hospital Pain Intervention(s) Repositioned; Ambulation/increased activity; Emotional support   Restrictions/Precautions   Weight Bearing Precautions Per Order No   Other Precautions Cognitive; Chair Alarm; Bed Alarm;Multiple lines; Fall Risk;Pain  (2x nephrostomy tubes, catheter)   Home Living   Type of [address] [place] [address] Two level;1/2 bath on main level  (9 ADILENE; chair lift to 2nd floor)   Bathroom Shower/Tub Tub/shower unit   Bathroom Toilet Standard   Bathroom Equipment Grab bars in shower; Shower chair;Commode   Home Equipment Walker;Cane;Stair glide  (rollator- was using PTA)   Prior Function   Level of [place] Independent with ADLs; Independent with functional mobility   Lives With Spouse; Family   Receives Help From Family   IADLs Family/Friend/Other provides transportation; Independent with meal prep; Independent with medication management   Falls in the last 6 months 1 to 4  (4 per pt report)   Vocational Retired   General   Family/Caregiver Present No   Cognition   Arousal/Participation Cooperative   Orientation Level Oriented to person;Oriented to place;Oriented to time   Memory Decreased recall of precautions   Following Commands Follows one step commands with increased time or repetition   Comments pt with flat affect, cooperative to participate in therapy session   RLE Assessment   RLE Assessment   (functionally 3/5)   LLE Assessment   LLE Assessment   (functionally 3/5)   Bed Mobility   Supine to Sit 3  Moderate assistance   Additional items Assist x 2; Increased time required;Verbal cues;LE management   Sit to Supine Unable to assess   Additional Comments pt supine in bed upon arrival  Pt sitting OOB in recliner with alarm on and and all needs wihtin reach at the end of therapy session   Transfers   Sit to Stand 3  Moderate assistance   Additional items Assist x 2; Increased time required;Verbal cues   Stand to Sit 3  Moderate assistance   Additional items Assist x 2; Increased time required;Verbal cues   Additional Comments transfers wih rollator   Ambulation/Elevation   Gait pattern Excessively slow; Short stride; Foward flexed;Decreased foot clearance; Improper Weight shift   Gait Assistance 3  Moderate assist   Additional items Assist x 2;Verbal cues; Tactile cues   Assistive Device 4-wheeled walker   Distance 4ft to bedside chair   Stair Management Assistance Not tested   Balance   Static Sitting Fair   Dynamic Sitting Fair -   Static Standing Poor +   Dynamic Standing Poor   Ambulatory Poor   Endurance Deficit   Endurance Deficit Yes   Endurance Deficit Description fatigue  weakness, pain   Activity Tolerance   Activity Tolerance Patient limited by fatigue;Patient limited by pain   Medical Staff Made Aware OT; co-eval performed this date 2* increased medical complexity and multiple co-morbidities   Nurse Made Aware RN cleared pt to particpate in therapy session   Assessment   Prognosis Fair   Problem List Decreased strength;Decreased endurance;Decreased range of motion; Impaired balance;Decreased mobility; Impaired judgement;Decreased safety awareness;Pain   Assessment Pt seen for high complexity PT evaluation  Pt with active PT eval/treat and up and OOB as toelrated orders  Pt is a 76 y o  male who was admitted to [facility] on 12/12/2022 with hematuria  Pt's current dx/ problem list include: CAD, HTN, type 2 DM, malignant neoplasm of urinary bladder, PAD, depression, chronic pain syndrome, CKD, UTI, radial nerve palsy   Comorbidities affecting pt's physical performance at time of assessment are as follows:  has a past medical history of Anemia, Anxiety, Arteriosclerotic cardiovascular disease, Arthritis, [facility] ([facility] Chronic kidney disease, CKD (chronic kidney disease) stage 4, GFR 15-29 ml/min ([facility] ), Colon polyp, Coronary artery disease, Depression, Diabetes mellitus ([facility] ), GERD (gastroesophageal reflux disease), Glaucoma, Hematuria, History of fusion of cervical spine, Hyperlipidemia, Hypertension, Insomnia, Loss of hearing, Lung cancer ([facility] ), Metastatic cancer ([facility] ), Other seasonal allergic rhinitis, PAD (peripheral artery disease) ([facility] ), Shortness of breath, Spinal stenosis of lumbar region, Transient cerebral ischemia, and Uses walker  Personal factors affecting pt at time of initial examination include: steps to enter environment, limited home support, advanced age, past experience, inability to perform IADLs, inability to perform ADLs, inability to ambulate household distances, limited insight into impairments and recent fall(s)  Due to acute medical issues, ongoing medical workup for primary dx; pain, fall risk, increased reliance on more restrictive AD compared to baseline;  decreased activity tolerance compared to baseline, increased assistance needed from caregiver at current time, continuous telemetry monitoring, multiple lines, decline in overall functional mobility status; health management issues; note unstable clinical picture (high complexity)  At baseline, pt resides with spouse + family in 2  with 9 ADILENE and was independent using rollaotr prior to hospital admission  Currently, upon initial examination, pt  is requiring mod Ax2 A for bed mobility skills; mod Ax2 for functional transfers and mod A x2 for ambulation with RW   Currently, pt presents functioning below baseline and with overall mobility deficits 2* to: decreased LE strength/AROM; limited flexibility;  generalized weakness/ deconditioning; decreased endurance; decreased activity tolerance; decreased coordination; impaired balance; gait deviations; decreased safety awareness;fatigue; impaired safety and judgement; limited insight into current deficits; bed/ chair alarms; multiple lines; as well as : weakness, decreased ROM, impaired balance, decreased endurance, impaired coordination, gait deviations, pain, decreased activity tolerance, decreased functional mobility tolerance and fall risk  Pt currently at increased risk for falls  At the end of evaluation, pt was left sitting OOB in recliner with all needs in reach  Pt would benefit from continued skilled PT services while in hospital to address remaining limitations and maximize functional potential  PT to continue to follow pt and recommends rehab upon d/c  The patient's AM-PAC Basic Mobility Inpatient Short Form Raw Score is 8  A Raw score of less than or equal to 16 suggests the patient may benefit from discharge to post-acute rehabilitation services  Please also refer to the recommendation of the Physical Therapist for safe discharge planning  Barriers to Discharge Inaccessible home environment;Decreased caregiver support   Goals   Patient Goals to have less pain   STG Expiration Date 12/30/22   Short Term Goal #1 STG 1  Pt will be able to perform bed mobility tasks with S level in order to improve overall functional mobility and assist in safe d/c  STG 2  Pt with sit EOB for at least 25 minutes at S level in order to strengthen abdominal musculature and assist in future transfers/ ambulation  STG 3  Pt will be able to perform functional transfer with S level in order to improve overall functional mobility and assist in safe d/c  STG 4  Pt will be able to ambulate at least 150 feet with least restrictive device with S level A in order to improve overall functional mobility and assist in safe d/c  STG 5  Pt will improve sitting/standing static/dynamic balance 1/2 grade in order to improve functional mobility and assist in safe d/c  STG 6  Pt will improve LE strength by 1/2 grade in order to improve functional mobility and assist in safe d/c  STG 7   Pt will be able to negotiate at least 9 stairs with least restrictive device with S level A in order to improve overall functional mobility and assist in safe d/c    PT Treatment Day 0   Plan   Treatment/Interventions ADL retraining;Functional transfer training;LE strengthening/ROM; Patient/family training;Equipment eval/education; Bed mobility;Gait training;Spoke to nursing;Spoke to case management;OT   PT Frequency Other (Comment)  (2-4x/wk)   Recommendation   PT Discharge Recommendation Post acute rehabilitation services   AM-PAC Basic Mobility Inpatient   Turning in Bed Without Bedrails 3   Lying on Back to Sitting on Edge of Flat Bed 1   Moving Bed to Chair 1   Standing Up From Chair 1   Walk in Room 1   Climb 3-5 Stairs 1   Basic Mobility Inpatient Raw Score 8   Highest Level Of Mobility   JH-HLM Goal 3: Sit at edge of bed   JH-HLM Achieved 4: Move to chair/commode   Modified Lynn Scale   Modified Lynn Scale 4         Tawanna Andrade, PT DPT

## 2022-12-16 NOTE — QUICK NOTE
Patient woke up with right wrist drop and inability to extend wrist and fingers of the right hand  He also endorses numbness of the right digits and pain near the proximal humerus  He states that he had 3 falls recently at home around Thanksgiving time and has hx of bilateral rotator cuff tears  He states he was also feeling a little confused and "out of it" when he woke up  On physical exam patient is A&Ox4 with visible right sided wrist drop, decreased active ROM of the right shoulder 2/2 pain; active ROM right elbow intact, inability to extend the right wrist and fingers  RLE ROM and sensation intact, no cranial nerve deficits  TTP of the right proximal humerus and left distal humerus  LUE/LLE ROM intact  Glucose notably low at 78 and hemodynamically stable  At this time, suspect likely radial nerve palsy   Lower suspicion for stroke      -obtain xray right humerus to check for fx given hx of fall and TTP proximal humerus; will also obtain of left humerus due to left distal humeral TTP as per patient   -pending xrays consider sling   -treat hypoglycemia   -treat pain with PRN oxycodone, IV dilaudid, gabapentin   -neurology consult appreciated

## 2022-12-16 NOTE — ASSESSMENT & PLAN NOTE
Lab Results   Component Value Date    HGBA1C 7 2 (H) 10/20/2022       Recent Labs     12/15/22  1201 12/15/22  1629 12/15/22  2215 12/16/22  0640   POCGLU 164* 223* 160* 78       Blood Sugar Average: Last 72 hrs:  (P) 857 [id number]     · Continue home insulin lantus 18 units at bedtime, Humalog 5 units TID   · Sliding scale

## 2022-12-16 NOTE — PLAN OF CARE
Problem: OCCUPATIONAL THERAPY ADULT  Goal: Performs self-care activities at highest level of function for planned discharge setting  See evaluation for individualized goals  Description: Treatment Interventions: ADL retraining, Functional transfer training, UE strengthening/ROM, Endurance training, Cognitive reorientation, Patient/family training, Equipment evaluation/education, Neuromuscular reeducation, Fine motor coordination activities, Compensatory technique education, UE splinting, Continued evaluation, Energy conservation, Activityengagement          See flowsheet documentation for full assessment, interventions and recommendations  Note: Limitation: Decreased ADL status, Decreased UE ROM, Decreased UE strength, Decreased cognition, Decreased endurance, Decreased self-care trans, Decreased fine motor control, Decreased high-level ADLs, Decreased sensation  Prognosis: Fair  Assessment: Pt is a 76 y o  male admitted to [facility] on 12/12/2022 w/ Hematuria, UTI  Now with b/l nephrostomy tubes and wagner  Pt also currently presenting with R-sided wrist drop  Imaging negative for acute abnormality  has a past medical history of Anemia, Anxiety, Arteriosclerotic cardiovascular disease, Arthritis, Bladder cancer, CKD, Colon polyp, Coronary artery disease, Depression, Diabetes mellitus, GERD, Glaucoma, Hematuria, History of fusion of cervical spine, Hyperlipidemia, Hypertension, Insomnia, Loss of hearing, Lung cancer, Metastatic cancer, PAD, Shortness of breath, Spinal stenosis of lumbar region, Transient cerebral ischemia  Pt with active OT orders and up and OOB as tolerated orders  Pt seen as a co-evaluation with PT due to the patient's co-morbidities, clinically unstable presentation/clinical complexity, and present impairments  As per pt report, pta, resides with his wife and ANNITA in a 2STH, 9STE  Pt was I w/  ADLS and IADLS, (-) drove   Upon evaluation, pt currently requires MOD A x 2 with rollator for transfers and mobility  Exhibits decreased strength and ROM to RUE limiting performance in ADLs/transfers  Pt currently requires S eating, MIN A grooming, MOD A UB ADLs, MAX A LB ADLs, and MOD A toileting  Pt is limited at this time 2*: pain, endurance, activity tolerance, functional mobility, balance, functional standing tolerance, decreased I w/ ADLS/IADLS, strength, ROM, sensation deficits, cognitive impairments and impaired fine motor skills  The following Occupational Performance Areas to address include: eating, grooming, bathing/shower, toilet hygiene, dressing, health maintenance, functional mobility, community mobility and clothing management  Pt to benefit from immediate acute skilled OT to address above deficits, improve overall functional independence, maximize fnxl mobility and reduce caregiver burden  From OT standpoint, recommendation at time of d/c would be STR  Pt was left in chair with alarm on after session with all current needs met  The patient's raw score on the AM-PAC Daily Activity inpatient short form is 14, standardized score is 33 39, less than 39 4  Patients at this level are likely to benefit from discharge to post-acute rehabilitation services  Please refer to the recommendation of the Occupational Therapist for safe discharge planning       OT Discharge Recommendation: Post acute rehabilitation services

## 2022-12-16 NOTE — RESTORATIVE TECHNICIAN NOTE
Restorative Technician Note      Patient Name: Jose Jiménez     Restorative Tech Visit Date: 12/16/22  Note Type: Bracing, Initial consult  Patient Position Upon Consult: Bedside chair  Brace Applied: Other (Comment) (Large resting hand splint, RUE)  Patient Position When Brace Applied: Seated  Education Provided: Yes  Patient Position at End of Consult: Bedside chair  Nurse Communication: Nurse aware of consult, application of brace    Please call Mobility Coordinator at ext  [phone] or on [employer] text " [facility]-PT-Restorative Tech" role in regards to bracing instruction and/or adjustment      Em Barnes Restorative Technician, BS

## 2022-12-16 NOTE — PROGRESS NOTES
Progress Note - Urology  Maycol Renee 1945, 76 y o  male MRN: [id number]    Unit/Bed#: [facility] 917-01 Encounter: [id number]    * Hematuria  Assessment & Plan  · Maintain indwelling Shirley catheter and continue to titrate CBI to off  Urine output improving  · Maintain bilateral PCN, and continue daily flushes  · maintain right ureteral stent, and exchange with possible fulguration/TURBT in 3 months  Our office will arrange outpatient follow-up  · Continue to monitor renal function, creatinine slowly trending downward, now 3 35  nephrology following  · Monitor H&H  · Medical optimization per primary team     Urology will continue to follow    68-year-old male with history of BCG refractory recurrent high-grade bladder cancer status post chemoradiation 2021 and currently receiving immunotherapy  He underwent placement of bilateral percutaneous nephrostomy tubes for ureteral obstruction  They remain in place right PCN draining dark yellow colored urine with some sediment  left PCN draining clear yellow urine  Patient is now POD #2 cystoscopy with evacuation of clots and placement of right ureteral stent for persistent gross hematuria  Small clot burden was completely evacuated Shirley catheter remains in urethra draining light pink-tinged urine with sediment  This darkens to a punch color without cbi  No clots noted  Patient afebrile, vital signs stable  Creatinine 3 35 (slightly improved from yesterday), WBC 11 67 yesterday, and hemoglobin 7 7 yesterday  Subjective: resting in bed without complaints offered    24 HR EVENTS:   no significant events  Patient has  no complaints  Review of Systems   Constitutional: Negative for activity change, appetite change, chills, fatigue, fever and unexpected weight change  HENT: Negative for facial swelling  Eyes: Negative for discharge  Respiratory: Negative  Negative for cough and shortness of breath      Cardiovascular: Negative for chest pain and leg swelling  Gastrointestinal: Negative  Negative for abdominal distention, abdominal pain, constipation, diarrhea, nausea and vomiting  Endocrine: Negative  Genitourinary: Positive for difficulty urinating and hematuria  Negative for decreased urine volume, dysuria, enuresis, flank pain, frequency, genital sores and urgency  Musculoskeletal: Negative for back pain and myalgias  Skin: Negative for pallor and rash  Allergic/Immunologic: Negative  Negative for immunocompromised state  Neurological: Negative for facial asymmetry and speech difficulty  Psychiatric/Behavioral: Negative for agitation and confusion  Objective:  Nursing Rounds: Kate Emmanuel  Vitals: Blood pressure 115/66, pulse 94, temperature 97 5 °F (36 4 °C), resp  rate 16, height 6' 3" (1 905 m), weight 96 2 kg (212 lb), SpO2 95 %  ,Body mass index is 26 5 kg/m²  INS & OUTS:  I/O last 24 hours: In: 480 [P O :480]  Out: 2745 [Urine:2745]    Physical Exam  Vitals and nursing note reviewed  Constitutional:       General: He is not in acute distress  Appearance: Normal appearance  He is obese  He is not ill-appearing, toxic-appearing or diaphoretic  HENT:      Head: Normocephalic  Pulmonary:      Effort: Pulmonary effort is normal  No respiratory distress  Abdominal:      General: Abdomen is flat  There is no distension  Tenderness: There is no abdominal tenderness  There is no guarding or rebound  Genitourinary:     Comments: Right PCN with dark yellow urine and sediment, left PCN with clear yellow urine, Shirley catheter with light pink to punch colored urine without clots  Musculoskeletal:         General: No swelling  Cervical back: Normal range of motion  Skin:     General: Skin is warm and dry  Neurological:      General: No focal deficit present  Mental Status: He is alert and oriented to person, place, and time     Psychiatric:         Mood and Affect: Mood normal          Behavior: Behavior normal  Thought Content: Thought content normal          Judgment: Judgment normal          Imaging:  CT ABDOMEN AND PELVIS WITHOUT IV CONTRAST     INDICATION:   evaluate bilateral neprhrostomy tube placement; pt feels it is dislodged and having hematuria      COMPARISON:  CT scan from 11/17/2022      TECHNIQUE:  CT examination of the abdomen and pelvis was performed without intravenous contrast  Axial, sagittal, and coronal 2D reformatted images were created from the source data and submitted for interpretation       Radiation dose length product (DLP) for this visit:  1097 44 mGy-cm   This examination, like all CT scans performed in the [facility], was performed utilizing techniques to minimize radiation dose exposure, including the use of   iterative reconstruction and automated exposure control       Enteric contrast was not administered       FINDINGS:     ABDOMEN     LOWER CHEST:  Stable left pleural effusion with drainage catheter in place  Stable adjacent atelectasis  Stable epicardial nodules likely lymph nodes the largest unchanged measuring 3 6 cm      LIVER/BILIARY TREE:  Unremarkable      GALLBLADDER:  Removed      SPLEEN:  Unremarkable      PANCREAS:  Unremarkable      ADRENAL GLANDS:  Unremarkable      KIDNEYS/URETERS:  Interval development of right-sided hydroureteronephrosis despite the low percutaneous nephrostomy tube  Hypodensity in the distal right ureter likely blood  Stable appearance of the left kidney with left-sided percutaneous   nephrostomy tube  Stable left renal cyst      STOMACH AND BOWEL:  Status post gastric bypass surgery  No bowel obstruction      APPENDIX:  No findings to suggest appendicitis      ABDOMINOPELVIC CAVITY:  No ascites  No pneumoperitoneum    Stable retroperitoneal adenopathy the largest measuring 2 2 cm on image 2/42      VESSELS:  Unremarkable for patient's age      PELVIS     REPRODUCTIVE ORGANS:  Unremarkable for patient's age      URINARY BLADDER:  Stable appearance of the bladder with asymmetric thickening of the left bladder wall in keeping with history of bladder cancer      ABDOMINAL WALL/INGUINAL REGIONS:  Unremarkable      OSSEOUS STRUCTURES:  No acute fracture or destructive osseous lesion      IMPRESSION:     Stable bladder wall thickening towards the left compatible with known neoplasm      New right hydronephrosis with high density intraluminal material in the distal right ureter likely representing blood      Stable metastatic retroperitoneal lymphadenopathy      Stable left pleural effusion with epicardial metastasis  Imaging reviewed - both report and images personally reviewed       Labs:  Recent Labs     12/14/22  0610 12/15/22  0539   WBC 9 31 11 67*       Recent Labs     12/14/22  0610 12/14/22  1646 12/15/22  0539   HGB 7 0* 7 1* 7 7*     Recent Labs     12/14/22  0610 12/14/22  1646 12/15/22  0539   HCT 22 1* 23 8* 24 0*     Recent Labs     12/14/22  0610 12/15/22  0539 12/16/22  0548   CREATININE 3 80* 3 48* 3 35*     Lab Results   Component Value Date    HGB 7 7 (L) 12/15/2022    HCT 24 0 (L) 12/15/2022    WBC 11 67 (H) 12/15/2022     12/15/2022     Lab Results   Component Value Date     09/03/2015    K 4 1 12/16/2022     12/16/2022    CO2 24 12/16/2022    BUN 50 (H) 12/16/2022    CREATININE 3 35 (H) 12/16/2022    CALCIUM 8 6 12/16/2022    GLUCOSE 203 (H) 09/03/2015     Urinalysis: Innumerable WBC's per HPF, innumerable RBC's per HPF and innumerable + bacteria  Urine Culture: Growth: Enterobacter cloacae and Enterococcus faecalis    History:    Past Medical History:   Diagnosis Date   • Anemia     Last assessed: 9/28/17   • Anxiety    • Arteriosclerotic cardiovascular disease     Last assessed: 9/28/17   • Arthritis    • Bladder cancer ([facility] [address] )     bladder- had BCG treatments   • Chronic kidney disease     Stage IV   • CKD (chronic kidney disease) stage 4, GFR 15-29 ml/min ([facility])    • Colon polyp    • Coronary artery disease     7 stents   • Depression    • Diabetes mellitus (HCC)     IDDM   • GERD (gastroesophageal reflux disease)    • Glaucoma    • Hematuria    • History of fusion of cervical spine    • Hyperlipidemia    • Hypertension    • Insomnia     Last assessed: 11/14/12   • Loss of hearing     has hearing aids but usually does not wear them   • Lung cancer [facility])    • Metastatic cancer ([facility] [address] )    • Other seasonal allergic rhinitis     Last assessed: 2/10/16   • PAD (peripheral artery disease) ([facility])    • Shortness of breath     on exertion   • Spinal stenosis of lumbar region    • Transient cerebral ischemia     No Residual   • Uses walker     w/c for longer distances     Past Surgical History:   Procedure Laterality Date   • CARDIAC SURGERY      Cath stent placement  Last assessed: 3/9/17  Interventional Catheterization   • CHOLECYSTECTOMY     • COLONOSCOPY     • CYSTOSCOPY      Diagnostic w/biopsy  Xiomara Srinivasan  Last assessed: 12/1/14   • CYSTOSCOPY N/A 04/12/2022    Procedure: CYSTOSCOPY  Bladder biopsies  ;  Surgeon: Glenis Bustos MD;  [facility];  Service: Urology   • CYSTOSCOPY W/ RETROGRADES Right 03/01/2022    Procedure: CYSTO; stent removal retrograde;  Surgeon: Glenis Bustos MD;  [facility];  Service: Urology   • CYSTOSCOPY W/ URETERAL STENT PLACEMENT Bilateral 10/18/2021    Procedure: bilateral retrogrades, cytology collection;  Surgeon: Glenis Bustos MD;  [facility];  Service: Urology   • CYSTOURETHROSCOPY      w/cautery  Xiomara Srinivasan   • FL RETROGRADE PYELOGRAM  10/18/2021   • FL RETROGRADE PYELOGRAM  10/24/2021   • FL RETROGRADE PYELOGRAM  12/14/2022   • GASTRIC BYPASS      For morbid obesity w/Shaji-en-Y   Resolved: 11/17/09   • INCISION AND DRAINAGE OF WOUND Right 02/26/2017    Procedure: INCISION AND DRAINAGE (I&D) EXTREMITY WITH APPLICATION OF GRAFT JACKET;  Surgeon: Ananda Garcia DPM;  [facility];  Service:    • INCISION AND DRAINAGE OF WOUND Right 04/25/2017    Procedure: INCISION AND DRAINAGE (I&D) EXTREMITY, APPLICATION OF GRAFT;  Surgeon: Susette Severin, DPM;  [facility];  Service:    • IR BIOPSY OTHER  07/02/2020   • IR LOWER EXTREMITY ANGIOGRAM  02/08/2021   • IR LOWER EXTREMITY ANGIOGRAM  02/11/2021   • IR NEPHROSTOMY TUBE CHECK/CHANGE/REPOSITION/REINSERTION/UPSIZE  04/28/2022   • IR NEPHROSTOMY TUBE CHECK/CHANGE/REPOSITION/REINSERTION/UPSIZE  05/24/2022   • IR NEPHROSTOMY TUBE CHECK/CHANGE/REPOSITION/REINSERTION/UPSIZE  06/07/2022   • IR NEPHROSTOMY TUBE CHECK/CHANGE/REPOSITION/REINSERTION/UPSIZE  07/28/2022   • IR NEPHROSTOMY TUBE CHECK/CHANGE/REPOSITION/REINSERTION/UPSIZE  11/15/2022   • IR NEPHROSTOMY TUBE PLACEMENT  02/25/2022   • IR PORT PLACEMENT  01/17/2022   • IR THORACENTESIS  09/02/2022   • IR THORACENTESIS  09/14/2022   • IR THORACENTESIS WITH TUBE PLACEMENT Left     october   • IR TUNNELED CENTRAL LINE PLACEMENT  12/24/2020   • JOINT REPLACEMENT      christofre knees replaced   • WV AMPUTATION METATARSAL+TOE,SINGLE Left 12/21/2020    Procedure: RAY RESECTION FOOT;  Surgeon: Brittney Seals DPM;  [facility];  Service: Podiatry   • WV AMPUTATION METATARSAL+TOE,SINGLE Left 12/31/2020    Procedure: 5TH MET RESECTION;  Surgeon: Brittney Seals DPM;  [facility];  Service: Podiatry   • WV CYSTOURETHROSCOPY W/IRRIG & EVAC CLOTS N/A 02/10/2021    Procedure: CYSTOSCOPY EVACUATION OF CLOTS, fulguration;  Surgeon: Lacy Mora MD;  [facility];  Service: Urology   • WV CYSTOURETHROSCOPY W/IRRIG & EVAC CLOTS N/A 10/24/2021    Procedure: CYSTOSCOPY EVACUATION OF CLOT, fulguration of bleeding vessels, right ureter stent placement, retrograde pyelogram;  Surgeon: Cindy Liriano MD;  [facility];  Service: Urology   • WV CYSTOURETHROSCOPY,BIOPSY N/A 08/16/2016    Procedure: CYSTOSCOPY WITH BIOPSIES;  Surgeon: Bhumika Anderson MD;  [facility];  Service: Urology   • WV CYSTOURETHROSCOPY,FULGUR <0 5 CM LESN N/A 11/19/2020    Procedure: CYSTO W/BIOPSIES, transurethral prostate bx;  Surgeon: Idalia Tavares MD;  [facility];  Service: Urology   • OH CYSTOURETHROSCOPY,FULGUR >5 CM LESN Bilateral 10/18/2021    Procedure: TRANSURETHRAL RESECTION OF BLADDER TUMOR (TURBT);   Surgeon: Shannon Roy MD;  [facility];  Service: Urology   • OH CYSTOURETHROSCOPY,URETER CATHETER Right 12/14/2022    Procedure: CYSTOSCOPY WITH RETROGRADE PYELOGRAM and CLOT EVACUATION, RIGHT STENT INSERTION, FULGRATION OF BLEEDING POINTS;  Surgeon: Karen Browning MD;  [facility];  Service: Urology   • OH Yina Davison 3RD+ ORD [address] PEL/LXTR [facility] Left 02/08/2021    Procedure: LEG angiogram, CO2 w/limited contrast with balloon angioplasty postertior tibial artery;  Surgeon: Mackenzie Gee MD;  [facility];  Service: Vascular   • ROTATOR CUFF REPAIR     • SMALL INTESTINE SURGERY      Surgery Shaji-en-Y   • SPINAL FUSION      lumbar and cervical fusions   • VAC DRESSING APPLICATION Right 31/82/9538    Procedure: APPLICATION VAC DRESSING;  Surgeon: Dominique Robins DPM;  [facility];  Service:    • WOUND DEBRIDEMENT Left 02/16/2021    Procedure: FOOT DEBRIDE, [address] OUT w/graft application;  Surgeon: Dominique Robins DPM;  [facility];  Service: Podiatry     Family History   Problem Relation Age of Onset   • Diabetes Mother    • Heart disease Mother    • Other Mother         High blood pressure   • Heart disease Father    • Diabetes Sister    • Other Sister         High blood pressure   • Kidney disease Sister    • Heart disease Brother    • Other Brother         High blood pressure     Social History     Socioeconomic History   • Marital status: /Civil Union     Spouse name: None   • Number of children: None   • Years of education: None   • Highest education level: None   Occupational History   • None   Tobacco Use   • Smoking status: Former     Packs/day: 3 00     Years: 27 00     Pack years: 81 00     Types: Cigarettes     Quit date: 1970     Years since quittin 9   • Smokeless tobacco: Never   Vaping Use   • Vaping Use: Never used   Substance and Sexual Activity   • Alcohol use: Never     Comment: beer / liquor   • Drug use: Not Currently     Types: Marijuana     Comment: quit 2019 had medical marijuana   • Sexual activity: Not Currently   Other Topics Concern   • None   Social History Narrative    Consumes 1 cup of coffee and 1 soda per day     Social Determinants of Health     Financial Resource Strain: Not on file   Food Insecurity: No Food Insecurity   • Worried About Running Out of Food in the Last Year: Never true   • Ran Out of Food in the Last Year: Never true   Transportation Needs: No Transportation Needs   • Lack of Transportation (Medical): No   • Lack of Transportation (Non-Medical):  No   Physical Activity: Not on file   Stress: Not on file   Social Connections: Not on file   Intimate Partner Violence: Not on file   Housing Stability: Low Risk    • Unable to Pay for Housing in the Last Year: No   • Number of Places Lived in the Last Year: 1   • Unstable Housing in the Last Year: No       The following portions of the patient's history were reviewed and updated as appropriate: allergies, current medications, past family history, past medical history, past social history, past surgical history and problem list    EVANGELINA Donovan  Date: 2022 Time: 3:19 PM

## 2022-12-16 NOTE — OCCUPATIONAL THERAPY NOTE
Occupational Therapy Evaluation     Patient Name: Eloy Green  Today's Date: 12/16/2022  Problem List  Principal Problem:    Hematuria  Active Problems:    Coronary artery disease of native artery of native heart with stable angina pectoris ([facility] )    Hypertension with renal disease    Type 2 diabetes mellitus, with long-term current use of insulin ([facility])    Malignant neoplasm of anterior wall of urinary bladder ([facility])    PAD (peripheral artery disease) ([facility])    Moderate major depression, single episode ([facility])    Chronic pain syndrome    CKD (chronic kidney disease)    UTI (urinary tract infection)    Radial nerve palsy, right    Past Medical History  Past Medical History:   Diagnosis Date    Anemia     Last assessed: 9/28/17    Anxiety     Arteriosclerotic cardiovascular disease     Last assessed: 9/28/17    Arthritis     Bladder cancer ([facility] )     bladder- had BCG treatments    Chronic kidney disease     Stage IV    CKD (chronic kidney disease) stage 4, GFR 15-29 ml/min ([facility])     Colon polyp     Coronary artery disease     7 stents    Depression     Diabetes mellitus ([facility])     IDDM    GERD (gastroesophageal reflux disease)     Glaucoma     Hematuria     History of fusion of cervical spine     Hyperlipidemia     Hypertension     Insomnia     Last assessed: 11/14/12    Loss of hearing     has hearing aids but usually does not wear them    Lung cancer ([facility] )     Metastatic cancer ([facility] )     Other seasonal allergic rhinitis     Last assessed: 2/10/16    PAD (peripheral artery disease) ([facility])     Shortness of breath     on exertion    Spinal stenosis of lumbar region     Transient cerebral ischemia     No Residual    Uses walker     w/c for longer distances     Past Surgical History  Past Surgical History:   Procedure Laterality Date    CARDIAC SURGERY      Cath stent placement  Last assessed: 3/9/17  Interventional Catheterization    CHOLECYSTECTOMY      COLONOSCOPY      CYSTOSCOPY      Diagnostic w/biopsy   Claribel Acosta, Sammy Number  Last assessed: 12/1/14    CYSTOSCOPY N/A 04/12/2022    Procedure: CYSTOSCOPY  Bladder biopsies  ;  Surgeon: Tyree Rowland MD;  [facility];  Service: Urology    CYSTOSCOPY W/ RETROGRADES Right 03/01/2022    Procedure: CYSTO; stent removal retrograde;  Surgeon: Tyree Rowland MD;  [facility];  Service: Urology    CYSTOSCOPY W/ URETERAL STENT PLACEMENT Bilateral 10/18/2021    Procedure: bilateral retrogrades, cytology collection;  Surgeon: Tyree Rowland MD;  [facility];  Service: Urology    CYSTOURETHROSCOPY      w/cautery  Sobeida Good    FL RETROGRADE PYELOGRAM  10/18/2021    FL RETROGRADE PYELOGRAM  10/24/2021    FL RETROGRADE PYELOGRAM  12/14/2022    GASTRIC BYPASS      For morbid obesity w/Shaji-en-Y   Resolved: 11/17/09    INCISION AND DRAINAGE OF WOUND Right 02/26/2017    Procedure: INCISION AND DRAINAGE (I&D) EXTREMITY WITH APPLICATION OF GRAFT JACKET;  Surgeon: Gabriela Waters DPM;  [facility];  Service:     INCISION AND DRAINAGE OF WOUND Right 04/25/2017    Procedure: INCISION AND DRAINAGE (I&D) EXTREMITY, APPLICATION OF GRAFT;  Surgeon: Gabriela Waters DPM;  [facility];  Service:     IR BIOPSY OTHER  07/02/2020    IR LOWER EXTREMITY ANGIOGRAM  02/08/2021    IR LOWER EXTREMITY ANGIOGRAM  02/11/2021    IR NEPHROSTOMY TUBE CHECK/CHANGE/REPOSITION/REINSERTION/UPSIZE  04/28/2022    IR NEPHROSTOMY TUBE CHECK/CHANGE/REPOSITION/REINSERTION/UPSIZE  05/24/2022    IR NEPHROSTOMY TUBE CHECK/CHANGE/REPOSITION/REINSERTION/UPSIZE  06/07/2022    IR NEPHROSTOMY TUBE CHECK/CHANGE/REPOSITION/REINSERTION/UPSIZE  07/28/2022    IR NEPHROSTOMY TUBE CHECK/CHANGE/REPOSITION/REINSERTION/UPSIZE  11/15/2022    IR NEPHROSTOMY TUBE PLACEMENT  02/25/2022    IR PORT PLACEMENT  01/17/2022    IR THORACENTESIS  09/02/2022    IR THORACENTESIS  09/14/2022    IR THORACENTESIS WITH TUBE PLACEMENT Left     october    IR TUNNELED CENTRAL LINE PLACEMENT  12/24/2020    JOINT REPLACEMENT      christofer knees replaced    IN AMPUTATION METATARSAL+TOE,SINGLE Left 12/21/2020    Procedure: RAY RESECTION FOOT;  Surgeon: Stormy Carmona DPM;  [facility];  Service: Podiatry    IN AMPUTATION METATARSAL+TOE,SINGLE Left 12/31/2020    Procedure: 5TH MET RESECTION;  Surgeon: Stormy Carmona DPM;  [facility];  Service: Podiatry    IN CYSTOURETHROSCOPY W/IRRIG & EVAC CLOTS N/A 02/10/2021    Procedure: CYSTOSCOPY EVACUATION OF CLOTS, fulguration;  Surgeon: Rufino Lamas MD;  [facility];  Service: Urology    IN CYSTOURETHROSCOPY W/IRRIG & EVAC CLOTS N/A 10/24/2021    Procedure: CYSTOSCOPY EVACUATION OF CLOT, fulguration of bleeding vessels, right ureter stent placement, retrograde pyelogram;  Surgeon: Tamiko Gómez MD;  [facility];  Service: Urology    IN CYSTOURETHROSCOPY,BIOPSY N/A 08/16/2016    Procedure: Trenton Needle;  Surgeon: Shawnee Felton MD;  [facility];  Service: Urology    IN CYSTOURETHROSCOPY,FULGUR <0 5 CM LESN N/A 11/19/2020    Procedure: CYSTO W/BIOPSIES, transurethral prostate bx;  Surgeon: Shaun Austin MD;  [facility];  Service: Urology    IN CYSTOURETHROSCOPY,FULGUR >5 CM LESN Bilateral 10/18/2021    Procedure: TRANSURETHRAL RESECTION OF BLADDER TUMOR (TURBT);   Surgeon: Ada Daniel MD;  [facility];  Service: Urology    IN CYSTOURETHROSCOPY,URETER CATHETER Right 12/14/2022    Procedure: CYSTOSCOPY WITH RETROGRADE PYELOGRAM and CLOT EVACUATION, RIGHT STENT INSERTION, FULGRATION OF BLEEDING POINTS;  Surgeon: López Mahoney MD;  [facility];  Service: Urology    IN Larwence Pandy 3RD+ ORD [address] PEL/LXTR [facility] Left 02/08/2021    Procedure: LEG angiogram, CO2 w/limited contrast with balloon angioplasty postertior tibial artery;  Surgeon: Bree Cedeño MD;  [facility];  Service: Vascular    ROTATOR [facility]      Surgery Shaji-en-Y    SPINAL FUSION lumbar and cervical fusions    VAC DRESSING APPLICATION Right 61/82/1713    Procedure: APPLICATION VAC DRESSING;  Surgeon: Valerie Templeton DPM;  [facility];  Service:     WOUND DEBRIDEMENT Left 02/16/2021    Procedure: FOOT DEBRIDE, [address] Labidi OUT w/graft application;  Surgeon: Valerie Templeton DPM;  [facility];  Service: Podiatry           12/16/22 1454   OT Last Visit   OT Visit Date 12/16/22   Note Type   Note type Evaluation   Pain Assessment   Pain Assessment Tool 0-10   Pain Score 6   Pain Location/Orientation Orientation: Right;Location: [facility] Pain Intervention(s) Repositioned; Ambulation/increased activity   Restrictions/Precautions   Weight Bearing Precautions Per Order No   Braces or Orthoses   (neuro requesting wrist cock up splint - requested order from D O  +  notified bracing team)   Other Precautions Cognitive; Chair Alarm; Bed Alarm;Multiple lines; Fall Risk;Pain  (x2 neph tube, wagner, R wrist drop)   Home Living   Type of [address] [place] [address] Two level;1/2 bath on main level  (9STE)   Bathroom Shower/Tub Tub/shower unit   Bathroom Toilet Standard   Bathroom Equipment Shower chair;Commode  (did not use PTA)   Home Equipment Walker;Cane  (used rollator PTA)   Prior Function   Level of [place] Independent with ADLs; Independent with IADLS   Lives With Spouse  (+ ANNITA)   Receives Help From Family   IADLs   (independent IADLs)   Falls in the last 6 months 1 to 4  (4 per pt report)   Vocational Retired   Lifestyle   Autonomy PTA, pt reports being I with ADLs, IADLs, rollator for fnxl mobility, (-)    Reciprocal Relationships Spouse, ANNITA - pt reports someone is always home   Service to Others Retired   Intr[employer] Gr[employer]   ADL   Where Assessed Edge of bed   Eating Assistance 5  [facility] 4  [facility] 3  Moderate Assistance   LB [address] 2  [address] 3 Moderate Assistance   LB Dressing Assistance 2  [address]  3  Moderate Assistance   Bed Mobility   Supine to Sit 3  Moderate assistance   Additional items Assist x 2;HOB elevated; Bedrails; Increased time required;LE management   Sit to Supine Unable to assess   Transfers   Sit to Stand 3  Moderate assistance   Additional items Assist x 2; Increased time required   Stand to Sit 3  Moderate assistance   Additional items Assist x 2; Increased time required   Additional Comments transfers with rollator, increased A to place R hand onto rollator   Functional Mobility   Functional Mobility 3  Moderate assistance   Additional Comments Ax2   Additional items   (rollator)   Balance   Static Sitting Fair   Dynamic Sitting Fair -   Static Standing Poor +   Dynamic Standing Poor   Ambulatory Poor   Activity Tolerance   Activity Tolerance Patient limited by fatigue   Medical Staff Made Aware PT Celina Dejesus   Nurse Made Aware RN clearance for session   RUE Assessment   RUE Assessment X  (Pt with wrist drop  Does have ability to slightly grasp but decreased strength  Unable to actively extend fingers  Wrist in flexed position, unable to perform active extension  Pt is able to perform elbow flexion/extension, 3/5  ~ 2+/5 shoulder flexion)   LUE Assessment   LUE Assessment WFL   Hand Function   Fine Motor Coordination Impaired   Hand Function Comments impaired RUE   Sensation   Additional Comments Decreased sensation to RUE distally, normal sensation proximally   Psychosocial   Psychosocial (WDL) WDL   Cognition   Overall Cognitive Status Impaired   Arousal/Participation Responsive; Cooperative   Attention Attends with cues to redirect   Orientation Level Oriented X4   Following Commands Follows one step commands with increased time or repetition   Comments Pt pleasant and cooperative t/o session, decreased attention/awareness at times   Assessment   Limitation Decreased ADL status; Decreased UE ROM; Decreased UE strength;Decreased cognition;Decreased endurance;Decreased self-care trans;Decreased fine motor control;Decreased high-level ADLs; Decreased sensation   Prognosis Fair   Assessment Pt is a 76 y o  male admitted to [facility] on 12/12/2022 w/ Hematuria, UTI  Now with b/l nephrostomy tubes and wagner  Pt also currently presenting with R-sided wrist drop  Imaging negative for acute abnormality  has a past medical history of Anemia, Anxiety, Arteriosclerotic cardiovascular disease, Arthritis, Bladder cancer, CKD, Colon polyp, Coronary artery disease, Depression, Diabetes mellitus, GERD, Glaucoma, Hematuria, History of fusion of cervical spine, Hyperlipidemia, Hypertension, Insomnia, Loss of hearing, Lung cancer, Metastatic cancer, PAD, Shortness of breath, Spinal stenosis of lumbar region, Transient cerebral ischemia  Pt with active OT orders and up and OOB as tolerated orders  Pt seen as a co-evaluation with PT due to the patient's co-morbidities, clinically unstable presentation/clinical complexity, and present impairments  As per pt report, pta, resides with his wife and ANNITA in a 2STH, 9STE  Pt was I w/  ADLS and IADLS, (-) drove  Upon evaluation, pt currently requires MOD A x 2 with rollator for transfers and mobility  Exhibits decreased strength and ROM to RUE limiting performance in ADLs/transfers  Pt currently requires S eating, MIN A grooming, MOD A UB ADLs, MAX A LB ADLs, and MOD A toileting  Pt is limited at this time 2*: pain, endurance, activity tolerance, functional mobility, balance, functional standing tolerance, decreased I w/ ADLS/IADLS, strength, ROM, sensation deficits, cognitive impairments and impaired fine motor skills  The following Occupational Performance Areas to address include: eating, grooming, bathing/shower, toilet hygiene, dressing, health maintenance, functional mobility, community mobility and clothing management   Pt to benefit from immediate acute skilled OT to address above deficits, improve overall functional independence, maximize fnxl mobility and reduce caregiver burden  From OT standpoint, recommendation at time of d/c would be STR  Pt was left in chair with alarm on after session with all current needs met  The patient's raw score on the AM-PAC Daily Activity inpatient short form is 14, standardized score is 33 39, less than 39 4  Patients at this level are likely to benefit from discharge to post-acute rehabilitation services  Please refer to the recommendation of the Occupational Therapist for safe discharge planning  Goals   Patient Goals to decrease his pain   LTG Time Frame 10-14   Plan   Treatment Interventions ADL retraining;Functional transfer training;UE strengthening/ROM; Endurance training;Cognitive reorientation;Patient/family training;Equipment evaluation/education; Neuromuscular reeducation; Fine motor coordination activities; Compensatory technique education;UE splinting;Continued evaluation; Energy conservation; Activityengagement   Goal Expiration Date 12/30/22   OT Frequency   (2-4x/wk)   Recommendation   OT Discharge Recommendation Post acute rehabilitation services   AM-[facility] Daily Activity Inpatient   Lower Body Dressing 2   Bathing 2   Toileting 2   Upper Body Dressing 2   Grooming 3   Eating 3   Daily Activity Raw Score 14   Daily Activity Standardized Score (Calc for Raw Score >=11) 33 39   AM-[facility] Applied Cognition Inpatient   Following a Speech/Presentation 3   Understanding Ordinary Conversation 4   Taking Medications 4   Remembering Where Things Are Placed or Put Away 4   Remembering List of 4-5 Errands 3   Taking Care of Complicated Tasks 3   Applied Cognition Raw Score 21   Applied Cognition Standardized Score 44 3     GOALS    - Pt will complete UB dressing/self care/bathing w/ mod I using adaptive device and DME as needed    - Pt will complete LB dressing/self care/bathing w/ mod I using adaptive device and DME as needed    - Pt will complete toileting w/ mod I w/ G hygiene/thoroughness using DME as needed    - Pt will improve functional transfers to Mod I on/off all surfaces using DME as needed w/ G balance/safety     - Pt will improve functional mobility during ADL/IADL/leisure tasks to Mod I using DME as needed w/ G balance/safety     - Pt will demonstrate G carryover of pt/caregiver education and training as appropriate  - Pt will demonstrate 100% carryover of energy conservation techniques t/o functional I/ADL/leisure tasks w/o cues s/p skilled education    - Pt will engage in ongoing cognitive assessment w/ G participation to assist w/ safe d/c planning/recommendations    - Pt will increase static and dynamic standing/sitting balance to F+  using AD/DME as needed to increase safety and I during fnxl transfers and ADLs    - Pt will maintain upright sitting position for at least 20 min during dynamic fnxl activity with F+  balance and endurance to improve ADL performance and independence, as well as reduce risk of falls     - Pt will participate in fine/gross motor coordination/strenthening/dexterity exercises in order to increase participation in functional activities        Nicola Law MS, OTR/L

## 2022-12-16 NOTE — ASSESSMENT & PLAN NOTE
· Acute on chronic  · CT on admission revealed stable bladder wall thickening toward the left compatible with known neoplasm, new right hydronephrosis with high density intraluminal material in the distal right ureter likely representing blood, stable metastatic retroperitoneal lymphadenopathy, and 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  · Hemoglobin 8 5 on admission; 7 0 today  · Urology consult appreciated - plan to go to OR today for cystoscopy, clot evacuation, possible transurethral resection/fulguration of bleeding points within bladder and bilateral retrograde studies  · Monitor H&H, transfuse as needed for Hgb <7  · Patient with bilateral nephrostomy tubes and had a Shirley placed in ED

## 2022-12-16 NOTE — PLAN OF CARE
Problem: PHYSICAL THERAPY ADULT  Goal: Performs mobility at highest level of function for planned discharge setting  See evaluation for individualized goals  Description: Treatment/Interventions: ADL retraining, Functional transfer training, LE strengthening/ROM, Patient/family training, Equipment eval/education, Bed mobility, Gait training, Spoke to nursing, Spoke to case management, OT          See flowsheet documentation for full assessment, interventions and recommendations  Note: Prognosis: Fair  Problem List: Decreased strength, Decreased endurance, Decreased range of motion, Impaired balance, Decreased mobility, Impaired judgement, Decreased safety awareness, Pain  Assessment: Pt seen for high complexity PT evaluation  Pt with active PT eval/treat and up and OOB as toelrated orders  Pt is a 76 y o  male who was admitted to [facility] on 12/12/[facility]22 with hematuria  Pt's current dx/ problem list include: CAD, HTN, type 2 DM, malignant neoplasm of urinary bladder, PAD, depression, chronic pain syndrome, CKD, UTI, radial nerve palsy  Comorbidities affecting pt's physical performance at time of assessment are as follows:  has a past medical history of Anemia, Anxiety, Arteriosclerotic cardiovascular disease, Arthritis, Bladder cancer ([address] ), Chronic kidney disease, CKD (chronic kidney disease) stage 4, GFR 15-29 ml/min ([address] ), Colon polyp, Coronary artery disease, Depression, Diabetes mellitus ([address] ), GERD (gastroesophageal reflux disease), Glaucoma, Hematuria, History of fusion of cervical spine, Hyperlipidemia, Hypertension, Insomnia, Loss of hearing, Lung cancer ([address] ), Metastatic cancer ([address] ), Other seasonal allergic rhinitis, PAD (peripheral artery disease) ([address] ), Shortness of breath, Spinal stenosis of lumbar region, Transient cerebral ischemia, and Uses walker   Personal factors affecting pt at time of initial examination include: steps to enter environment, limited home support, advanced age, past experience, inability to perform IADLs, inability to perform ADLs, inability to ambulate household distances, limited insight into impairments and recent fall(s)  Due to acute medical issues, ongoing medical workup for primary dx; pain, fall risk, increased reliance on more restrictive AD compared to baseline;  decreased activity tolerance compared to baseline, increased assistance needed from caregiver at current time, continuous telemetry monitoring, multiple lines, decline in overall functional mobility status; health management issues; note unstable clinical picture (high complexity)  At baseline, pt resides with spouse + family in 2  with 9 ADILENE and was independent using rollaotr prior to hospital admission  Currently, upon initial examination, pt  is requiring mod Ax2 A for bed mobility skills; mod Ax2 for functional transfers and mod A x2 for ambulation with RW  Currently, pt presents functioning below baseline and with overall mobility deficits 2* to: decreased LE strength/AROM; limited flexibility;  generalized weakness/ deconditioning; decreased endurance; decreased activity tolerance; decreased coordination; impaired balance; gait deviations; decreased safety awareness;fatigue; impaired safety and judgement; limited insight into current deficits; bed/ chair alarms; multiple lines; as well as : weakness, decreased ROM, impaired balance, decreased endurance, impaired coordination, gait deviations, pain, decreased activity tolerance, decreased functional mobility tolerance and fall risk  Pt currently at increased risk for falls  At the end of evaluation, pt was left sitting OOB in recliner with all needs in reach  Pt would benefit from continued skilled PT services while in hospital to address remaining limitations and maximize functional potential  PT to continue to follow pt and recommends rehab upon d/c  The patient's AM-PAC Basic Mobility Inpatient Short Form Raw Score is 8   A Raw score of less than or equal to 16 suggests the patient may benefit from discharge to post-acute rehabilitation services  Please also refer to the recommendation of the Physical Therapist for safe discharge planning  Barriers to Discharge: Inaccessible home environment, Decreased caregiver support     PT Discharge Recommendation: Post acute rehabilitation services    See flowsheet documentation for full assessment

## 2022-12-16 NOTE — ASSESSMENT & PLAN NOTE
Lab Results   Component Value Date    EGFR 16 12/16/2022    EGFR 15 12/15/2022    EGFR 14 12/14/2022    CREATININE 3 35 (H) 12/16/2022    CREATININE 3 48 (H) 12/15/2022    CREATININE 3 80 (H) 12/14/2022   · Baseline creatinine this year appears to be low to mid-3s  · Monitor renal function  · Consider Nephro consult

## 2022-12-17 LAB
ANION GAP SERPL CALCULATED.3IONS-SCNC: 9 MMOL/L (ref 4–13)
BUN SERPL-MCNC: 49 MG/DL (ref 5–25)
CALCIUM SERPL-MCNC: 9 MG/DL (ref 8.3–10.1)
CHLORIDE SERPL-SCNC: 106 MMOL/L (ref 96–108)
CO2 SERPL-SCNC: 21 MMOL/L (ref 21–32)
CREAT SERPL-MCNC: 3.33 MG/DL (ref 0.6–1.3)
GFR SERPL CREATININE-BSD FRML MDRD: 16 ML/MIN/1.73SQ M
GLUCOSE SERPL-MCNC: 176 MG/DL (ref 65–140)
GLUCOSE SERPL-MCNC: 212 MG/DL (ref 65–140)
GLUCOSE SERPL-MCNC: 233 MG/DL (ref 65–140)
GLUCOSE SERPL-MCNC: 278 MG/DL (ref 65–140)
GLUCOSE SERPL-MCNC: 301 MG/DL (ref 65–140)
POTASSIUM SERPL-SCNC: 4.3 MMOL/L (ref 3.5–5.3)
SODIUM SERPL-SCNC: 136 MMOL/L (ref 135–147)

## 2022-12-17 RX ADMIN — DULOXETINE 30 MG: 30 CAPSULE, DELAYED RELEASE ORAL at 09:01

## 2022-12-17 RX ADMIN — INSULIN LISPRO 2 UNITS: 100 INJECTION, SOLUTION INTRAVENOUS; SUBCUTANEOUS at 22:04

## 2022-12-17 RX ADMIN — ARIPIPRAZOLE 2 MG: 2 TABLET ORAL at 09:02

## 2022-12-17 RX ADMIN — INSULIN LISPRO 5 UNITS: 100 INJECTION, SOLUTION INTRAVENOUS; SUBCUTANEOUS at 17:28

## 2022-12-17 RX ADMIN — POLYETHYLENE GLYCOL 3350 17 G: 17 POWDER, FOR SOLUTION ORAL at 09:01

## 2022-12-17 RX ADMIN — CALCITRIOL CAPSULES 0.25 MCG 0.25 MCG: 0.25 CAPSULE ORAL at 09:01

## 2022-12-17 RX ADMIN — BIMATOPROST 1 DROP: 0.1 SOLUTION/ DROPS OPHTHALMIC at 22:03

## 2022-12-17 RX ADMIN — PANTOPRAZOLE SODIUM 40 MG: 40 TABLET, DELAYED RELEASE ORAL at 06:35

## 2022-12-17 RX ADMIN — ACETAMINOPHEN 650 MG: 325 TABLET ORAL at 22:02

## 2022-12-17 RX ADMIN — GABAPENTIN 300 MG: 300 CAPSULE ORAL at 21:59

## 2022-12-17 RX ADMIN — SENNOSIDES 17.2 MG: 8.6 TABLET, FILM COATED ORAL at 17:27

## 2022-12-17 RX ADMIN — OXYCODONE HYDROCHLORIDE 10 MG: 10 TABLET ORAL at 21:58

## 2022-12-17 RX ADMIN — TAMSULOSIN HYDROCHLORIDE 0.4 MG: 0.4 CAPSULE ORAL at 16:29

## 2022-12-17 RX ADMIN — CEFTRIAXONE 1000 MG: 1 INJECTION, POWDER, FOR SOLUTION INTRAMUSCULAR; INTRAVENOUS at 16:29

## 2022-12-17 RX ADMIN — SENNOSIDES 17.2 MG: 8.6 TABLET, FILM COATED ORAL at 09:01

## 2022-12-17 RX ADMIN — ISOSORBIDE MONONITRATE 30 MG: 30 TABLET, EXTENDED RELEASE ORAL at 09:01

## 2022-12-17 RX ADMIN — OXYBUTYNIN 10 MG: 10 TABLET, FILM COATED, EXTENDED RELEASE ORAL at 09:01

## 2022-12-17 RX ADMIN — METOPROLOL SUCCINATE 100 MG: 100 TABLET, EXTENDED RELEASE ORAL at 09:01

## 2022-12-17 RX ADMIN — INSULIN LISPRO 1 UNITS: 100 INJECTION, SOLUTION INTRAVENOUS; SUBCUTANEOUS at 17:27

## 2022-12-17 RX ADMIN — INSULIN GLARGINE 18 UNITS: 100 INJECTION, SOLUTION SUBCUTANEOUS at 22:03

## 2022-12-17 RX ADMIN — INSULIN LISPRO 4 UNITS: 100 INJECTION, SOLUTION INTRAVENOUS; SUBCUTANEOUS at 12:27

## 2022-12-17 RX ADMIN — AZELASTINE HYDROCHLORIDE 1 SPRAY: 137 SPRAY, METERED NASAL at 17:29

## 2022-12-17 RX ADMIN — INSULIN LISPRO 5 UNITS: 100 INJECTION, SOLUTION INTRAVENOUS; SUBCUTANEOUS at 09:09

## 2022-12-17 RX ADMIN — FERROUS SULFATE TAB 325 MG (65 MG ELEMENTAL FE) 325 MG: 325 (65 FE) TAB at 06:34

## 2022-12-17 RX ADMIN — INSULIN LISPRO 5 UNITS: 100 INJECTION, SOLUTION INTRAVENOUS; SUBCUTANEOUS at 12:28

## 2022-12-17 RX ADMIN — HYDROMORPHONE HYDROCHLORIDE 0.5 MG: 1 INJECTION, SOLUTION INTRAMUSCULAR; INTRAVENOUS; SUBCUTANEOUS at 02:24

## 2022-12-17 RX ADMIN — EZETIMIBE 10 MG: 10 TABLET ORAL at 09:01

## 2022-12-17 RX ADMIN — INSULIN LISPRO 4 UNITS: 100 INJECTION, SOLUTION INTRAVENOUS; SUBCUTANEOUS at 09:09

## 2022-12-17 RX ADMIN — OXYCODONE HYDROCHLORIDE 10 MG: 10 TABLET ORAL at 06:34

## 2022-12-17 NOTE — PROGRESS NOTES
[facility] 48 PROGRESS NOTE   Saman Patton 76 y o  male MRN: [id number]  Unit/Bed#: [facility] 917-01 Encounter: [id number]  Reason for Consult: CKD    ASSESSMENT and PLAN:  1  Chronic kidney disease, stage IV  · Baseline creatinine 3 0 to 3 6  · Follows with Dr Jesse Jones of [facility]  · Admission creatinine SCr 3 57 on 12/12/22  · Renal function remained stable overall  Creatinine 3 33 today  · Electrolytes are stable      2  Hx of obstructive uropathy  · He has chronic bilateral PCN  · New R hydronephrosis noted on imaging this admission  · S/p cystoscopy and R ureteral stent placement on 12/14/22     3  Hematuria, bladder cancer  · Heavy disease burden per urology  · S/p cysto and fulguration of bleeding points + evac of clots 12/4/22  · Follow up with Dr Miguelito Grande  · Hematuria resolved with CBI       4  Hypertension  · BP acceptable  · Meds: Metoprolol succ 100 mg OD, ISMN 30 mg OD  · He is on Furosemide 20 mg QOD as an outpatient  · Restart Furosemide 20 mg QOD on discharge       5  Hx of hyperkalemia:   · He is on Veltassa QOD   · Restart Veltassa on discharge  6  Mineral and bone disease:  · Continue calcitriol 0 25 mcg daily  · Calcium level stable  7  Anemia  · Hgb 7 7 on 12/15/22    DISPOSITION:  · Stable renal function  · Stable for discharge from renal standpoint  · Okay to restart Furosemide and Veltassa at previous doses on discharge  · Follow-up with Dr Jesse Jones of [address] on discharge  SUBJECTIVE / 24H INTERVAL HISTORY:  R hand still weak  No other acute issues  No CP or SOB       OBJECTIVE:  Current Weight: Weight - Scale: 96 2 kg (212 lb)  Vitals:    12/16/22 0851 12/16/22 1436 12/16/22 2142 12/17/22 0636   BP: 114/65 115/66 125/80 139/80   Pulse: 98 94 77 89   Resp:   16 16   Temp: (!) 97 3 °F (36 3 °C) 97 5 °F (36 4 °C) (!) 96 8 °F (36 °C) 97 5 °F (36 4 °C)   TempSrc:       SpO2: 95% 95% 96% 97%   Weight:       Height:           Intake/Output Summary (Last 24 hours) at 12/17/2022 0804  Last data filed at 12/17/2022 0100  Gross per 24 hour   Intake 100 ml   Output 3050 ml   Net -2950 ml     General: conscious, cooperative, no distress  Skin: dry  Eyes: pink conjunctivae  ENT: moist mucous membranes  Respiratory: equal chest expansion, clear breath sounds  Cardiovascular: distinct heart sounds, normal rate, regular rhythm, no rub  Abdomen: soft, non tender, non distended, normal bowel sounds  Extremities: no edema  Genitourinary: (+) bilateral PCN, (+) wagner catheter  Neuro: awake, alert     Psych: appropriate affect    Medications:    Current Facility-Administered Medications:   •  acetaminophen (TYLENOL) tablet 650 mg, 650 mg, Oral, Q6H PRN, Shanta Jacobo MD, 650 mg at 12/13/22 2229  •  ARIPiprazole (ABILIFY) tablet 2 mg, 2 mg, Oral, Daily, Shanta Jacobo MD, 2 mg at 12/16/22 4884  •  azelastine (ASTELIN) 0 1 % nasal spray 1 spray, 1 spray, Each Nare, BID, Shanta Jacobo MD, 1 spray at 12/13/22 1218  •  bimatoprost (LUMIGAN) 0 01 % ophthalmic solution 1 drop, 1 drop, Both Eyes, HS, Shanta Jacobo MD, 1 drop at 12/16/22 2150  •  calcitriol (ROCALTROL) capsule 0 25 mcg, 0 25 mcg, Oral, Daily, Shanta Jacobo MD, 0 25 mcg at 12/16/22 1007  •  cefTRIAXone (ROCEPHIN) 1,000 mg in dextrose 5 % 50 mL IVPB, 1,000 mg, Intravenous, Q24H, Tonya Cody DO, Last Rate: 100 mL/hr at 12/16/22 1703, 1,000 mg at 12/16/22 1703  •  DULoxetine (CYMBALTA) delayed release capsule 30 mg, 30 mg, Oral, Daily, Shanta Jacobo MD, 30 mg at 12/16/22 1111  •  ezetimibe (ZETIA) tablet 10 mg, 10 mg, Oral, Daily, Shanta Jacobo MD, 10 mg at 12/16/22 4114  •  ferrous sulfate tablet 325 mg, 325 mg, Oral, Daily With Breakfast, Shanta Jacobo MD, 325 mg at 12/17/22 2903  •  fluticasone (FLONASE) 50 mcg/act nasal spray 1 spray, 1 spray, Nasal, Daily, Shanta Jacobo MD, 1 spray at 12/13/22 1218  •  gabapentin (NEURONTIN) capsule 300 mg, 300 mg, Oral, , Marie Gonzales Kelly Arzola MD, 300 mg at 12/16/22 2136  •  HYDROmorphone (DILAUDID) injection 0 5 mg, 0 5 mg, Intravenous, Q4H PRN, Wojciech Castro MD, 0 5 mg at 12/17/22 0224  •  insulin glargine (LANTUS) subcutaneous injection 18 Units 0 18 mL, 18 Units, Subcutaneous, HS, Wojciech Castro MD, 18 Units at 12/16/22 2136  •  insulin lispro (HumaLOG) 100 units/mL subcutaneous injection 1-5 Units, 1-5 Units, Subcutaneous, HS, Wojciech Castro MD, 1 Units at 12/16/22 2136  •  insulin lispro (HumaLOG) 100 units/mL subcutaneous injection 1-6 Units, 1-6 Units, Subcutaneous, TID AC, 1 Units at 12/16/22 1701 **AND** Fingerstick Glucose (POCT), , , TID AC, Wojciech Castro MD  •  insulin lispro (HumaLOG) 100 units/mL subcutaneous injection 5 Units, 5 Units, Subcutaneous, TID With Meals, Wojciech Castro MD, 5 Units at 12/16/22 1701  •  isosorbide mononitrate (IMDUR) 24 hr tablet 30 mg, 30 mg, Oral, QAM, Wojciech Castro MD, 30 mg at 12/16/22 9630  •  metoprolol succinate (TOPROL-XL) 24 hr tablet 100 mg, 100 mg, Oral, Daily, Wojciech Castro MD, 100 mg at 12/16/22 7376  •  ondansetron (ZOFRAN) injection 4 mg, 4 mg, Intravenous, Q6H PRN, Wojciech Castro MD  •  oxybutynin (DITROPAN-XL) 24 hr tablet 10 mg, 10 mg, Oral, Daily, Wojciech Castro MD, 10 mg at 12/16/22 2915  •  oxyCODONE (ROXICODONE) immediate release tablet 10 mg, 10 mg, Oral, Q6H PRN, Wojciech Castro MD, 10 mg at 12/17/22 8179  •  oxyCODONE (ROXICODONE) IR tablet 5 mg, 5 mg, Oral, Q6H PRN, Wojciech Castro MD, 5 mg at 12/13/22 2006  •  pantoprazole (PROTONIX) EC tablet 40 mg, 40 mg, Oral, Early Morning, Wojciech Castro MD, 40 mg at 12/17/22 7451  •  polyethylene glycol (MIRALAX) packet 17 g, 17 g, Oral, Daily, Wojciech Castro MD, 17 g at 12/16/22 8056  •  senna (SENOKOT) tablet 17 2 mg, 2 tablet, Oral, BID, Wojciech Castro MD, 17 2 mg at 12/16/22 1701  •  tamsulosin (FLOMAX) capsule 0 4 mg, 0 4 mg, Oral, Daily With Ceasar Mccann MD, 0 4 mg at 12/16/22 1546  •  zolpidem (AMBIEN) tablet 5 mg, 5 mg, Oral, HS PRN, Alyssia Rutherford MD, 5 mg at 12/16/22 2133    Laboratory Results:  Results from last 7 days   Lab Units 12/17/22  0725 12/16/22  0548 12/15/22  0539 12/14/22  1646 12/14/22  0610 12/13/22  0456 12/12/22  1358   WBC Thousand/uL  --   --  11 67*  --  9 31 12 95* 16 61*   HEMOGLOBIN g/dL  --   --  7 7* 7 1* 7 0* 7 9* 8 5*   HEMATOCRIT %  --   --  24 0* 23 8* 22 1* 24 4* 26 7*   PLATELETS Thousands/uL  --   --  237  --  230 235 247   POTASSIUM mmol/L 4 3 4 1 3 9  --  4 0 3 8 3 7   CHLORIDE mmol/L 106 108 106  --  106 105 102   CO2 mmol/L 21 24 24  --  23 22 20*   BUN mg/dL 49* 50* 49*  --  53* 50* 51*   CREATININE mg/dL 3 33* 3 35* 3 48*  --  3 80* 3 46* 3 57*   CALCIUM mg/dL 9 0 8 6 9 0  --  9 0 9 1 9 4

## 2022-12-17 NOTE — RESTORATIVE TECHNICIAN NOTE
Restorative Technician Note      Patient Name: Mary Kate Daley     Patient Active Problem List   Diagnosis   • Coronary artery disease of native artery of native heart with stable angina pectoris [facility])   • Bladder carcinoma ([facility])   • Hypertension with renal disease   • Hyperlipidemia   • Presence of stent in coronary artery   • Type 2 diabetes mellitus, with long-term current use of insulin ([facility])   • Primary osteoarthritis of left knee   • Cystitis due to intravesical BCG administration   • Degeneration of lumbar intervertebral disc   • Back pain   • Arteriosclerosis of artery of extremity ([facility])   • Stable angina pectoris ([facility])   • Herpes zoster without complication   • Localized edema   • Superficial phlebitis   • Preop examination   • Acute renal failure (ARF) ([facility])   • Secondary renal hyperparathyroidism ([facility])   • Malignant neoplasm of anterior wall of urinary bladder ([facility])   • Abnormal MRI, lumbar spine   • Diabetic polyneuropathy associated with type 2 diabetes mellitus ([facility])   • Dysuria   • Diabetic ulcer of left foot associated with type 2 diabetes mellitus, 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 ([facility])   • Acute kidney injury superimposed on CKD ([facility])   • Chronic anemia   • Anemia of chronic disease   • Preoperative clearance   • PAD (peripheral artery disease) ([facility])   • Left carotid bruit   • Gross hematuria   • Chronic bronchitis ([facility])   • Moderate major depression, single episode ([facility])   • Thrombocytopenia ([facility])   • 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   • Lumbar spondylosis   • Chronic pain syndrome   • Acute urinary retention   • Hematuria   • Continuous opioid dependence ([facility])   • Port-A-Cath in place   • 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[facility])   • Embolism and 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 ([facility])   • Abnormal CT scan, bladder   • Retained ureteral stent   • Fall   • Hyperkalemia   • CKD (chronic kidney disease)   • Bilateral hydronephrosis   • Cancer related pain   • Syncope and collapse   • Depression with suicidal ideation   • Pulmonary nodules   • Retroperitoneal lymphadenopathy   • Shortness of breath   • History of tobacco use disorder   • Recurrent left pleural effusion   • UTI (urinary tract infection)   • Right wrist drop     Note Type: Bracing, Follow-up fitting  Patient Position Upon Consult: Supine  Brace Applied: Other (Comment) (R resting hand splint)  Additional Brace Ordered: No  Patient Position When Brace Applied: Supine  Education Provided: Yes; Other (comment) (Educated RN Amisha Lebron on how to apply R resting hand splint )  Patient Position at End of Consult: Supine; All needs within reach; Bed/Chair alarm activated  Nurse Communication: Nurse aware of consult, application of brace (Please contact [facility] [phone] regarding bracing instructions/questions   Thank you!)    Hui ALARCON, Restorative Technician,

## 2022-12-17 NOTE — ASSESSMENT & PLAN NOTE
· Diagnosed in 1994 s/p treatment with BCG, however recurrence, s/p chemoradiation in 2021  · Has bilateral nephrostomy tube  · Patient prescribed prednisone 20 mg daily - patient's granddaughter reports that he was prescribed this for his pleural effusion but was not taking  Patient refused prednisone  Discontinued order     · Urology following

## 2022-12-17 NOTE — PROGRESS NOTES
[address] [facility]  Progress Note - Flordia Barthel 1945, 76 y o  male MRN: [id number]  Unit/Bed#: [facility] 917-01 Encounter: [id number]  Primary Care Provider: Nayeli Fischer MD   Date and time admitted to hospital: 12/12/2022  3:38 PM    * Hematuria  Assessment & Plan  · Acute on chronic  · CT on admission revealed stable bladder wall thickening toward the left compatible with known neoplasm, new right hydronephrosis with high density intraluminal material in the distal right ureter likely representing blood, stable metastatic retroperitoneal lymphadenopathy, and 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  · Hemoglobin 8 5 on admission; 7 0 today  · Note uro input    possibly dc cbi tomorrow  · Patient with bilateral nephrostomy tubes and had a Shirley placed in ED    Right wrist drop  Assessment & Plan  Follow-up on imaging rule out fracture  neg    UTI (urinary tract infection)  Assessment & Plan  · PMH of Recurrent UTI on suppressive bactrim at home, presented with UTI     Will need to hold Bactrim given acute on chronic renal failure  · Was placed on cefepime on admission  Prelim urine culture growing >100,000 gram negative rebeca  · Culture sensitivities show: Enterobacter and Enterococcus  · De-escalate to Rocephin  · Possible colonization with Enterococcus given patient without fever spike despite resistance pattern    · Suspect leukocytosis may be postprocedure effect cystoscopy yesterday    Moderate major depression, single episode ([facility])  Assessment & Plan  · Continue home meds    PAD (peripheral artery disease) ([facility])  Assessment & Plan  · Aspirin on hold as above    Malignant neoplasm of anterior wall of urinary bladder ([facility])  Assessment & Plan  · Diagnosed in 1994 s/p treatment with BCG, however recurrence, s/p chemoradiation in 2021  · Has bilateral nephrostomy tube  · Patient prescribed prednisone 20 mg daily - patient's granddaughter reports that he was prescribed this for his pleural effusion but was not taking  Patient refused prednisone  Discontinued order  · Urology following     Type 2 diabetes mellitus, with long-term current use of insulin [facility])  Assessment & Plan  Lab Results   Component Value Date    HGBA1C 7 2 (H) 10/20/2022       Recent Labs     22  1621 22  2140 22  0636 22  1111   POCGLU 153* 165* 301* 278*       Blood Sugar Average: Last 72 hrs:  (P) 193     · Continue home insulin lantus 18 units at bedtime, Humalog 5 units TID   · Sliding scale    Hypertension with renal disease  Assessment & Plan  · Continue metoprolol, imdur  · Lasix on hold given rise in creatinine   · Avoid hypotension       VTE Pharmacologic Prophylaxis:     Mechanical VTE Prophylaxis in Place: No    Patient Centered Rounds: I have performed bedside rounds with nursing staff today  Time Spent for Care: 15 minutes  More than 50% of total time spent on counseling and coordination of care as described above  Current Length of Stay: 5 day(s)    Current Patient Status: Inpatient       Code Status: Level 1 - Full Code      Subjective:   nad    Objective:     Vitals:   Temp (24hrs), Av 2 °F (36 2 °C), Min:96 8 °F (36 °C), Max:97 5 °F (36 4 °C)    Temp:  [96 8 °F (36 °C)-97 5 °F (36 4 °C)] 97 5 °F (36 4 °C)  HR:  [77-89] 89  Resp:  [16] 16  BP: (125-139)/(80) 139/80  SpO2:  [96 %-97 %] 96 %  Body mass index is 26 5 kg/m²  Input and Output Summary (last 24 hours): Intake/Output Summary (Last 24 hours) at 2022 1527  Last data filed at 2022 1257  Gross per 24 hour   Intake 220 ml   Output 4200 ml   Net -3980 ml       Physical Exam:     Physical Exam  Constitutional:       Appearance: Normal appearance  HENT:      Head: Normocephalic and atraumatic  Mouth/Throat:      Mouth: Mucous membranes are moist    Cardiovascular:      Rate and Rhythm: Normal rate and regular rhythm  Pulmonary:      Effort: No respiratory distress  Abdominal:      General: Abdomen is flat  Palpations: Abdomen is soft  Skin:     General: Skin is warm and dry  Neurological:      General: No focal deficit present  Mental Status: He is alert and oriented to person, place, and time  Psychiatric:         Mood and Affect: Mood normal          Additional Data:     Labs:    Results from last 7 days   Lab Units 12/15/22  0539 12/13/22  0456 12/12/22  1358   WBC Thousand/uL 11 67*   < > 16 61*   HEMOGLOBIN g/dL 7 7*   < > 8 5*   HEMATOCRIT % 24 0*   < > 26 7*   PLATELETS Thousands/uL 237   < > 247   NEUTROS PCT %  --   --  84*   LYMPHS PCT %  --   --  7*   MONOS PCT %  --   --  7   EOS PCT %  --   --  1    < > = values in this interval not displayed  Results from last 7 days   Lab Units 12/17/22  0725 12/13/22  0456 12/12/22  1358   POTASSIUM mmol/L 4 3   < > 3 7   CHLORIDE mmol/L 106   < > 102   CO2 mmol/L 21   < > 20*   BUN mg/dL 49*   < > 51*   CREATININE mg/dL 3 33*   < > 3 57*   CALCIUM mg/dL 9 0   < > 9 4   ALK PHOS U/L  --   --  114   ALT U/L  --   --  21   AST U/L  --   --  19    < > = values in this interval not displayed  Results from last 7 days   Lab Units 12/12/22  1358   INR  1 01       * I Have Reviewed All Lab Data Listed Above  * Additional Pertinent Lab Tests Reviewed:  All Labs Within Last 24 Hours Reviewed        Recent Cultures (last 7 days):     Results from last 7 days   Lab Units 12/12/22  1359   URINE CULTURE  >100,000 cfu/ml Enterobacter cloacae*  >100,000 cfu/ml Enterococcus faecalis*       Last 24 Hours Medication List:   Current Facility-Administered Medications   Medication Dose Route Frequency Provider Last Rate   • acetaminophen  650 mg Oral Q6H PRN Krista Jefferson MD     • ARIPiprazole  2 mg Oral Daily Krista Jefferson MD     • azelastine  1 spray Each Nare BID Krista Jefferson MD     • bimatoprost  1 drop Both Eyes HS Krista Jefferson MD     • calcitriol  0 25 mcg Oral Daily Dontae Fernandez Bay Hunt MD     • cefTRIAXone  1,000 mg Intravenous Q24H Tonya Cody DO 1,000 mg (12/16/22 1703)   • DULoxetine  30 mg Oral Daily Esau Myers MD     • ezetimibe  10 mg Oral Daily Esau Myers MD     • ferrous sulfate  325 mg Oral Daily With Breakfast Esau Myers MD     • fluticasone  1 spray Nasal Daily Esau Myers MD     • gabapentin  300 mg Oral HS Esau Myers MD     • HYDROmorphone  0 5 mg Intravenous Q4H PRN Esau Myers MD     • insulin glargine  18 Units Subcutaneous HS Esau Myers MD     • insulin lispro  1-5 Units Subcutaneous HS Esau Myers MD     • insulin lispro  1-6 Units Subcutaneous TID AC Esau Myers MD     • insulin lispro  5 Units Subcutaneous TID With Meals Esau Myers MD     • isosorbide mononitrate  30 mg Oral QAM Esau Myers MD     • metoprolol succinate  100 mg Oral Daily Esau Myers MD     • ondansetron  4 mg Intravenous Q6H PRN Esau Myers MD     • oxybutynin  10 mg Oral Daily Esau Myers MD     • oxyCODONE  10 mg Oral Q6H PRN Esau Myers MD     • oxyCODONE  5 mg Oral Q6H PRN Esau Myers MD     • pantoprazole  40 mg Oral Early Morning Esau Myers MD     • polyethylene glycol  17 g Oral Daily Esau Myers MD     • senna  2 tablet Oral BID Esau Myers MD     • tamsulosin  0 4 mg Oral Daily With Jass Herndon MD     • zolpidem  5 mg Oral HS PRN Esau Myers MD          Today, Patient Was Seen By: Papito Bhat DO    ** Please Note: Dictation voice to text software may have been used in the creation of this document   **

## 2022-12-17 NOTE — PROGRESS NOTES
Patient with history of treatment refractory high-grade bladder cancer status post chemoradiation in 2021 and intermittent hematuria  He is receiving immunotherapy  He has bilateral percutaneous nephrostomy tubes due to ureteral obstruction  He has had gross hematuria and currently has Shirley with slow continuous bladder irrigation  Currently no issue  Urine is clear in Shirley bag with slow CBI  Plan  Continue slow wean of CBI  Likely discontinue tomorrow if stable  Continue to monitor renal function  Outpatient follow-up planned

## 2022-12-17 NOTE — ASSESSMENT & PLAN NOTE
Lab Results   Component Value Date    HGBA1C 7 2 (H) 10/20/2022       Recent Labs     12/16/22  1621 12/16/22  2140 12/17/22  0636 12/17/22  1111   POCGLU 153* 165* 301* 278*       Blood Sugar Average: Last 72 hrs:  (P) 193     · Continue home insulin lantus 18 units at bedtime, Humalog 5 units TID   · Sliding scale

## 2022-12-17 NOTE — ASSESSMENT & PLAN NOTE
· Acute on chronic  · CT on admission revealed stable bladder wall thickening toward the left compatible with known neoplasm, new right hydronephrosis with high density intraluminal material in the distal right ureter likely representing blood, stable metastatic retroperitoneal lymphadenopathy, and 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  · Hemoglobin 8 5 on admission; 7 0 today  · Note uro input    possibly dc cbi tomorrow  · Patient with bilateral nephrostomy tubes and had a Shirley placed in ED

## 2022-12-18 LAB
ANION GAP SERPL CALCULATED.3IONS-SCNC: 7 MMOL/L (ref 4–13)
BASOPHILS # BLD AUTO: 0.04 THOUSANDS/ÂΜL (ref 0–0.1)
BASOPHILS NFR BLD AUTO: 0 % (ref 0–1)
BUN SERPL-MCNC: 44 MG/DL (ref 5–25)
CALCIUM SERPL-MCNC: 8.8 MG/DL (ref 8.3–10.1)
CHLORIDE SERPL-SCNC: 107 MMOL/L (ref 96–108)
CO2 SERPL-SCNC: 24 MMOL/L (ref 21–32)
CREAT SERPL-MCNC: 2.79 MG/DL (ref 0.6–1.3)
EOSINOPHIL # BLD AUTO: 0.21 THOUSAND/ÂΜL (ref 0–0.61)
EOSINOPHIL NFR BLD AUTO: 2 % (ref 0–6)
ERYTHROCYTE [DISTWIDTH] IN BLOOD BY AUTOMATED COUNT: 17.2 % (ref 11.6–15.1)
GFR SERPL CREATININE-BSD FRML MDRD: 20 ML/MIN/1.73SQ M
GLUCOSE SERPL-MCNC: 119 MG/DL (ref 65–140)
GLUCOSE SERPL-MCNC: 134 MG/DL (ref 65–140)
GLUCOSE SERPL-MCNC: 139 MG/DL (ref 65–140)
GLUCOSE SERPL-MCNC: 151 MG/DL (ref 65–140)
GLUCOSE SERPL-MCNC: 205 MG/DL (ref 65–140)
HCT VFR BLD AUTO: 23.9 % (ref 36.5–49.3)
HGB BLD-MCNC: 7.7 G/DL (ref 12–17)
IMM GRANULOCYTES # BLD AUTO: 0.12 THOUSAND/UL (ref 0–0.2)
IMM GRANULOCYTES NFR BLD AUTO: 1 % (ref 0–2)
LYMPHOCYTES # BLD AUTO: 1.15 THOUSANDS/ÂΜL (ref 0.6–4.47)
LYMPHOCYTES NFR BLD AUTO: 11 % (ref 14–44)
MCH RBC QN AUTO: 31.4 PG (ref 26.8–34.3)
MCHC RBC AUTO-ENTMCNC: 32.2 G/DL (ref 31.4–37.4)
MCV RBC AUTO: 98 FL (ref 82–98)
MONOCYTES # BLD AUTO: 0.83 THOUSAND/ÂΜL (ref 0.17–1.22)
MONOCYTES NFR BLD AUTO: 8 % (ref 4–12)
NEUTROPHILS # BLD AUTO: 7.86 THOUSANDS/ÂΜL (ref 1.85–7.62)
NEUTS SEG NFR BLD AUTO: 78 % (ref 43–75)
NRBC BLD AUTO-RTO: 0 /100 WBCS
PLATELET # BLD AUTO: 254 THOUSANDS/UL (ref 149–390)
PMV BLD AUTO: 10.3 FL (ref 8.9–12.7)
POTASSIUM SERPL-SCNC: 4 MMOL/L (ref 3.5–5.3)
RBC # BLD AUTO: 2.45 MILLION/UL (ref 3.88–5.62)
SODIUM SERPL-SCNC: 138 MMOL/L (ref 135–147)
WBC # BLD AUTO: 10.21 THOUSAND/UL (ref 4.31–10.16)

## 2022-12-18 RX ADMIN — FERROUS SULFATE TAB 325 MG (65 MG ELEMENTAL FE) 325 MG: 325 (65 FE) TAB at 08:34

## 2022-12-18 RX ADMIN — INSULIN LISPRO 5 UNITS: 100 INJECTION, SOLUTION INTRAVENOUS; SUBCUTANEOUS at 08:39

## 2022-12-18 RX ADMIN — INSULIN LISPRO 2 UNITS: 100 INJECTION, SOLUTION INTRAVENOUS; SUBCUTANEOUS at 11:51

## 2022-12-18 RX ADMIN — FLUTICASONE PROPIONATE 1 SPRAY: 50 SPRAY, METERED NASAL at 08:38

## 2022-12-18 RX ADMIN — CALCITRIOL CAPSULES 0.25 MCG 0.25 MCG: 0.25 CAPSULE ORAL at 08:34

## 2022-12-18 RX ADMIN — EZETIMIBE 10 MG: 10 TABLET ORAL at 08:34

## 2022-12-18 RX ADMIN — OXYCODONE HYDROCHLORIDE 5 MG: 5 TABLET ORAL at 08:38

## 2022-12-18 RX ADMIN — TAMSULOSIN HYDROCHLORIDE 0.4 MG: 0.4 CAPSULE ORAL at 17:19

## 2022-12-18 RX ADMIN — CEFTRIAXONE 1000 MG: 1 INJECTION, POWDER, FOR SOLUTION INTRAMUSCULAR; INTRAVENOUS at 17:21

## 2022-12-18 RX ADMIN — OXYCODONE HYDROCHLORIDE 10 MG: 10 TABLET ORAL at 11:21

## 2022-12-18 RX ADMIN — PANTOPRAZOLE SODIUM 40 MG: 40 TABLET, DELAYED RELEASE ORAL at 04:47

## 2022-12-18 RX ADMIN — OXYBUTYNIN 10 MG: 10 TABLET, FILM COATED, EXTENDED RELEASE ORAL at 08:31

## 2022-12-18 RX ADMIN — INSULIN LISPRO 1 UNITS: 100 INJECTION, SOLUTION INTRAVENOUS; SUBCUTANEOUS at 17:21

## 2022-12-18 RX ADMIN — SENNOSIDES 17.2 MG: 8.6 TABLET, FILM COATED ORAL at 08:34

## 2022-12-18 RX ADMIN — ARIPIPRAZOLE 2 MG: 2 TABLET ORAL at 08:38

## 2022-12-18 RX ADMIN — OXYCODONE HYDROCHLORIDE 10 MG: 10 TABLET ORAL at 23:09

## 2022-12-18 RX ADMIN — METOPROLOL SUCCINATE 100 MG: 100 TABLET, EXTENDED RELEASE ORAL at 08:35

## 2022-12-18 RX ADMIN — AZELASTINE HYDROCHLORIDE 1 SPRAY: 137 SPRAY, METERED NASAL at 08:38

## 2022-12-18 RX ADMIN — ISOSORBIDE MONONITRATE 30 MG: 30 TABLET, EXTENDED RELEASE ORAL at 08:34

## 2022-12-18 RX ADMIN — INSULIN LISPRO 5 UNITS: 100 INJECTION, SOLUTION INTRAVENOUS; SUBCUTANEOUS at 11:51

## 2022-12-18 RX ADMIN — INSULIN LISPRO 5 UNITS: 100 INJECTION, SOLUTION INTRAVENOUS; SUBCUTANEOUS at 17:21

## 2022-12-18 RX ADMIN — BIMATOPROST 1 DROP: 0.1 SOLUTION/ DROPS OPHTHALMIC at 23:07

## 2022-12-18 RX ADMIN — DULOXETINE 30 MG: 30 CAPSULE, DELAYED RELEASE ORAL at 08:34

## 2022-12-18 RX ADMIN — OXYCODONE HYDROCHLORIDE 10 MG: 10 TABLET ORAL at 17:20

## 2022-12-18 RX ADMIN — OXYCODONE HYDROCHLORIDE 10 MG: 10 TABLET ORAL at 04:48

## 2022-12-18 RX ADMIN — GABAPENTIN 300 MG: 300 CAPSULE ORAL at 23:07

## 2022-12-18 RX ADMIN — ACETAMINOPHEN 650 MG: 325 TABLET ORAL at 08:44

## 2022-12-18 RX ADMIN — INSULIN GLARGINE 18 UNITS: 100 INJECTION, SOLUTION SUBCUTANEOUS at 23:07

## 2022-12-18 RX ADMIN — POLYETHYLENE GLYCOL 3350 17 G: 17 POWDER, FOR SOLUTION ORAL at 08:31

## 2022-12-18 NOTE — PROGRESS NOTES
Progress Note - María Torres 76 y o  male MRN: [id number]    Unit/Bed#: [facility] 917-01 Encounter: [id number]      Assessment:  María Torres is a 49-year-old male with high-grade treatment refractory bladder cancer, status post chemoradiation 2021, on immunotherapy, malignant bilateral ureteral obstruction, bilateral percutaneous nephrostomy tube dependence, seen in urologic consultation this admission for gross hematuria  He is status post cystoscopy, clot evacuation, right retrograde pyelography and right ureteral stent by Dr Jamir Isidro 12/14  Afebrile and hemodynamically stable  Hemoglobin low but stable within past 2 days at 7 7  Modest leukocytosis of 10 2  Serum creatinine trended below known baseline--2 79 from 3 0--3 6  Left nephrostomy draining copiously 950 mL  Scant drainage from the right  Shirley in place for continuous bladder irrigation, patent for grossly clear yellow urine      Plan:  · Continue medical optimization per internal medicine and nephrology; appreciate  · Maintain bilateral PCNs to straight drainage  · Clamp three-way Shirley and maintain to straight drainage  · Additional support as per primary team   · Shirley removal prior to discharge  · Patient will follow-up in our office for reevaluation  He will undergo additional cystoscopy, fulguration and TURBT as needed in 3 months with Dr Temi Mendoza, primary urologist as well as right ureteral stent exchange  Subjective:   Resting comfortably  No complaints    Objective:     Vitals: Blood pressure 148/84, pulse 84, temperature (!) 97 2 °F (36 2 °C), resp  rate 20, height 6' 3" (1 905 m), weight 96 2 kg (212 lb), SpO2 97 %  ,Body mass index is 26 5 kg/m²        Intake/Output Summary (Last 24 hours) at 12/18/2022 1313  Last data filed at 12/18/2022 1101  Gross per 24 hour   Intake 240 ml   Output 1505 ml   Net -1265 ml       Physical Exam: General appearance: alert, appears stated age, cooperative and no distress  Head: Normocephalic, without obvious abnormality, atraumatic  Neck: no adenopathy, no carotid bruit, no JVD, supple, symmetrical, trachea midline and thyroid not enlarged, symmetric, no tenderness/mass/nodules  Lungs: diminished breath sounds  Heart: regular rate and rhythm, S1, S2 normal, no murmur, click, rub or gallop  Abdomen: soft, non-tender; bowel sounds normal; no masses,  no organomegaly  Extremities: extremities normal, warm and well-perfused; no cyanosis, clubbing, or edema  Pulses: 2+ and symmetric  Neurologic: Grossly normal  Three-way Shirley--CBI--grossly clear yellow     Invasive Devices     Central Venous Catheter Line  Duration           Port A Cath Right Chest -- days          Peripheral Intravenous Line  Duration           Peripheral IV 12/17/22 Distal;Right;Upper;Ventral (anterior) Arm 1 day          Drain  Duration           Nephrostomy Left 10 2 Fr  33 days    Nephrostomy Right 10 2 Fr  33 days    Continuous Bladder Irrigation Three-way 4 days              Lab Results   Component Value Date    WBC 10 21 (H) 12/18/2022    HGB 7 7 (L) 12/18/2022    HCT 23 9 (L) 12/18/2022    MCV 98 12/18/2022     12/18/2022       Lab Results   Component Value Date    SODIUM 138 12/18/2022    K 4 0 12/18/2022     12/18/2022    CO2 24 12/18/2022    BUN 44 (H) 12/18/2022    CREATININE 2 79 (H) 12/18/2022    GLUC 139 12/18/2022    CALCIUM 8 8 12/18/2022       Lab, Imaging and other studies: I have personally reviewed pertinent reports

## 2022-12-18 NOTE — ASSESSMENT & PLAN NOTE
Lab Results   Component Value Date    HGBA1C 7 2 (H) 10/20/2022       Recent Labs     12/17/22  1111 12/17/22  1651 12/17/22 2036 12/18/22  0803   POCGLU 278* 176* 212* 119       Blood Sugar Average: Last 72 hrs:  (P) 853 [id number]     · Continue home insulin lantus 18 units at bedtime, Humalog 5 units TID   · Sliding scale

## 2022-12-18 NOTE — PLAN OF CARE
Problem: Nutrition/Hydration-ADULT  Goal: Nutrient/Hydration intake appropriate for improving, restoring or maintaining nutritional needs  Description: Monitor and assess patient's nutrition/hydration status for malnutrition  Collaborate with interdisciplinary team and initiate plan and interventions as ordered  Monitor patient's weight and dietary intake as ordered or per policy  Utilize nutrition screening tool and intervene as necessary  Determine patient's food preferences and provide high-protein, high-caloric foods as appropriate  INTERVENTIONS:  - Monitor oral intake, urinary output, labs, and treatment plans  - Assess nutrition and hydration status and recommend course of action  - Evaluate amount of meals eaten  - Assist patient with eating if necessary   - Allow adequate time for meals  - Recommend/ encourage appropriate diets, oral nutritional supplements, and vitamin/mineral supplements  - Order, calculate, and assess calorie counts as needed  - Recommend, monitor, and adjust tube feedings and TPN/PPN based on assessed needs  - Assess need for intravenous fluids  - Provide specific nutrition/hydration education as appropriate  - Include patient/family/caregiver in decisions related to nutrition  Outcome: Progressing     Problem: MOBILITY - ADULT  Goal: Maintains/Returns to pre admission functional level  Description: INTERVENTIONS:  - Perform BMAT or MOVE assessment daily    - Set and communicate daily mobility goal to care team and patient/family/caregiver     - Collaborate with rehabilitation services on mobility goals if consulted  - Out of bed for toileting  - Record patient progress and toleration of activity level   Outcome: Progressing     Problem: Prexisting or High Potential for Compromised Skin Integrity  Goal: Skin integrity is maintained or improved  Description: INTERVENTIONS:  - Identify patients at risk for skin breakdown  - Assess and monitor skin integrity  - Assess and monitor nutrition and hydration status  - Monitor labs   - Assess for incontinence   - Turn and reposition patient  - Assist with mobility/ambulation  - Relieve pressure over bony prominences  - Avoid friction and shearing  - Provide appropriate hygiene as needed including keeping skin clean and dry  - Evaluate need for skin moisturizer/barrier cream  - Collaborate with interdisciplinary team   - Patient/family teaching  - Consider wound care consult   Outcome: Progressing

## 2022-12-18 NOTE — PROGRESS NOTES
[facility]  Progress Note - Josee Irizarry 1945, 76 y o  male MRN: [id number]  Unit/Bed#: [facility] 917-01 Encounter: [id number]  Primary Care Provider: Katherin Eldridge MD   Date and time admitted to hospital: 12/12/2022  3:38 PM    * Hematuria  Assessment & Plan  · Acute on chronic  · CT on admission revealed stable bladder wall thickening toward the left compatible with known neoplasm, new right hydronephrosis with high density intraluminal material in the distal right ureter likely representing blood, stable metastatic retroperitoneal lymphadenopathy, and 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  · Hemoglobin 8 5 on admission; 7 0 today  · Note uro input    possibly dc cbi tomorrow  · Patient with bilateral nephrostomy tubes and had a Shirley placed in ED    Right wrist drop  Assessment & Plan  Follow-up on imaging rule out fracture  neg    UTI (urinary tract infection)  Assessment & Plan  · PMH of Recurrent UTI on suppressive bactrim at home, presented with UTI     Will need to hold Bactrim given acute on chronic renal failure  · Was placed on cefepime on admission  Prelim urine culture growing >100,000 gram negative rebeca  · Culture sensitivities show: Enterobacter and Enterococcus  · De-escalate to Rocephin  · Possible colonization with Enterococcus given patient without fever spike despite resistance pattern  · Suspect leukocytosis may be postprocedure effect cystoscopy yesterday    CKD (chronic kidney disease)  Assessment & Plan  Lab Results   Component Value Date    EGFR 20 12/18/2022    EGFR 16 12/17/2022    EGFR 16 12/16/2022    CREATININE 2 79 (H) 12/18/2022    CREATININE 3 33 (H) 12/17/2022    CREATININE 3 35 (H) 12/16/2022   · Baseline creatinine this year appears to be low to mid-3s  · Monitor renal function  · Consider Nephro consult     Chronic pain syndrome  Assessment & Plan  · Pain control with oxycodone at home    · Pain control       Moderate major depression, single episode ([facility] [address] )  Assessment & Plan  · Continue home meds    PAD (peripheral artery disease) ([facility])  Assessment & Plan  · Aspirin on hold as above    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)  Assessment & Plan  · Diagnosed in  s/p treatment with BCG, however recurrence, s/p chemoradiation in   · Has bilateral nephrostomy tube  · Patient prescribed prednisone 20 mg daily - patient's granddaughter reports that he was prescribed this for his pleural effusion but was not taking  Patient refused prednisone  Discontinued order  · Urology following     Type 2 diabetes mellitus, with long-term current use of insulin [facility])  Assessment & Plan  Lab Results   Component Value Date    HGBA1C 7 2 (H) 10/20/2022       Recent Labs     22  1111 22  1651 226 22  0803   POCGLU 278* 176* 212* 119       Blood Sugar Average: Last 72 hrs:  (P) 602 [id number]     · Continue home insulin lantus 18 units at bedtime, Humalog 5 units TID   · Sliding scale    Hypertension with renal disease  Assessment & Plan  · Continue metoprolol, imdur  · Lasix on hold given rise in creatinine   · Avoid hypotension     Coronary artery disease of native artery of native heart with stable angina pectoris ([facility])  Assessment & Plan  · Continue metoprolol and isosorbide  · Hold aspirin due to hematuria      VTE Pharmacologic Prophylaxis:   Pharmacologic: hematuria  Mechanical VTE Prophylaxis in Place: No    Patient Centered Rounds: I have performed bedside rounds with nursing staff today  Time Spent for Care: 15 minutes  More than 50% of total time spent on counseling and coordination of care as described above      Current Length of Stay: 6 day(s)    Current Patient Status: Inpatient       Code Status: Level 1 - Full Code      Subjective:   nad    Objective:     Vitals:   Temp (24hrs), Av 6 °F (36 4 °C), Min:97 2 °F (36 2 °C), Max:97 9 °F (36 6 °C)    Temp:  [97 2 °F (36 2 °C)-97 9 °F (36 6 °C)] 97 2 °F (36 2 °C)  HR:  [76-87] 84  Resp:  [18-20] 20  BP: (124-148)/(71-84) 148/84  SpO2:  [96 %-98 %] 97 %  Body mass index is 26 5 kg/m²  Input and Output Summary (last 24 hours): Intake/Output Summary (Last 24 hours) at 12/18/2022 0932  Last data filed at 12/18/2022 0300  Gross per 24 hour   Intake 460 ml   Output 1275 ml   Net -815 ml       Physical Exam:     Physical Exam  Constitutional:       Appearance: Normal appearance  HENT:      Head: Normocephalic and atraumatic  Nose: Nose normal    Eyes:      Extraocular Movements: Extraocular movements intact  Pupils: Pupils are equal, round, and reactive to light  Cardiovascular:      Rate and Rhythm: Normal rate and regular rhythm  Pulmonary:      Effort: Pulmonary effort is normal       Breath sounds: Normal breath sounds  Abdominal:      General: Abdomen is flat  Palpations: Abdomen is soft  Neurological:      General: No focal deficit present  Mental Status: He is alert and oriented to person, place, and time  Psychiatric:         Mood and Affect: Mood normal          Behavior: Behavior normal          Additional Data:     Labs:    Results from last 7 days   Lab Units 12/18/22  0556   WBC Thousand/uL 10 21*   HEMOGLOBIN g/dL 7 7*   HEMATOCRIT % 23 9*   PLATELETS Thousands/uL 254   NEUTROS PCT % 78*   LYMPHS PCT % 11*   MONOS PCT % 8   EOS PCT % 2     Results from last 7 days   Lab Units 12/18/22  0556 12/13/22  0456 12/12/22  1358   POTASSIUM mmol/L 4 0   < > 3 7   CHLORIDE mmol/L 107   < > 102   CO2 mmol/L 24   < > 20*   BUN mg/dL 44*   < > 51*   CREATININE mg/dL 2 79*   < > 3 57*   CALCIUM mg/dL 8 8   < > 9 4   ALK PHOS U/L  --   --  114   ALT U/L  --   --  21   AST U/L  --   --  19    < > = values in this interval not displayed  Results from last 7 days   Lab Units 12/12/22  1358   INR  1 01       * I Have Reviewed All Lab Data Listed Above    * Additional Pertinent Lab Tests Reviewed: All Labs Within Last 24 Hours Reviewed      Recent Cultures (last 7 days):     Results from last 7 days   Lab Units 12/12/22  1359   URINE CULTURE  >100,000 cfu/ml Enterobacter cloacae*  >100,000 cfu/ml Enterococcus faecalis*       Last 24 Hours Medication List:   Current Facility-Administered Medications   Medication Dose Route Frequency Provider Last Rate   • acetaminophen  650 mg Oral Q6H PRN Bob Pritchard MD     • ARIPiprazole  2 mg Oral Daily Bob Pritchard MD     • azelastine  1 spray Each Nare BID Bbo Pritchard MD     • bimatoprost  1 drop Both Eyes HS Bob Pritchard MD     • calcitriol  0 25 mcg Oral Daily Bob Pritchard MD     • cefTRIAXone  1,000 mg Intravenous Q24H Hetul Cody, DO 1,000 mg (12/17/22 1629)   • DULoxetine  30 mg Oral Daily Bob Pritchard MD     • ezetimibe  10 mg Oral Daily Bob Pritchard MD     • ferrous sulfate  325 mg Oral Daily With Breakfast Bob Pritchard MD     • fluticasone  1 spray Nasal Daily Bob Pritchard MD     • gabapentin  300 mg Oral HS Bob Pritchard MD     • HYDROmorphone  0 5 mg Intravenous Q4H PRN Bob Pritchard MD     • insulin glargine  18 Units Subcutaneous HS Bob Pritchard MD     • insulin lispro  1-5 Units Subcutaneous HS Bob Pritchard MD     • insulin lispro  1-6 Units Subcutaneous TID Scott Albright MD     • insulin lispro  5 Units Subcutaneous TID With Meals Bob Pritchard MD     • isosorbide mononitrate  30 mg Oral QAM Bob Pritchard MD     • metoprolol succinate  100 mg Oral Daily Bob Pritchard MD     • ondansetron  4 mg Intravenous Q6H PRN Bob Pritchard MD     • oxybutynin  10 mg Oral Daily Bob Pritchard MD     • oxyCODONE  10 mg Oral Q6H PRN Bob Pritchard MD     • oxyCODONE  5 mg Oral Q6H PRN Bob Pritchard MD     • pantoprazole  40 mg Oral Early Morning Bob Pritchard MD     • polyethylene glycol  17 g Oral Daily Teresa Hairston MD     • senna  2 tablet Oral BID Teresa Hairston MD     • tamsulosin  0 4 mg Oral Daily With Latisha Rodrigez MD     • zolpidem  5 mg Oral HS PRN Teresa Hairston MD          Today, Patient Was Seen By: Nelia Davidson DO    ** Please Note: Dictation voice to text software may have been used in the creation of this document   **

## 2022-12-18 NOTE — NURSING NOTE
Patient requested the nephr  Tubes dressings to be removed immediately  Per pt, tape is making his skin itchy

## 2022-12-18 NOTE — PROGRESS NOTES
[facility] PROGRESS NOTE   Meghan Sharon 76 y o  male MRN: [id number]  Unit/Bed#: [facility] 917-01 Encounter: [id number]  Reason for Consult: CKD    ASSESSMENT and PLAN:  1  Chronic kidney disease, stage IV  · Baseline creatinine 3 0 to 3 6  · Follows with Dr Siri Herrera of [facility]  · Admission creatinine SCr 3 57 on 12/12/22  · Stable renal function  · Creatinine down to 2 79 today -this is below baseline  · Electrolytes are stable      2  Hx of obstructive uropathy  · He has chronic bilateral PCN  · New R hydronephrosis noted on CT on admission 12/12/22  · S/p cystoscopy and R ureteral stent placement on 12/14/22     3  Hematuria, bladder cancer  · Heavy disease burden per urology  · S/p cysto and fulguration of bleeding points + evac of clots 12/4/22  · Hematuria resolved with CBI  · Urology following       4  Hypertension  · BP controlled  · Meds: Metoprolol succ 100 mg OD, ISMN 30 mg OD  · He is on Furosemide 20 mg QOD as an outpatient  · Restart Furosemide 20 mg QOD on discharge       5  Hx of hyperkalemia:   · He is on Veltassa QOD   · Restart Veltassa on discharge  6  Mineral and bone disease:  · Continue calcitriol 0 25 mcg daily  · Calcium level stable  7  Anemia  · Hgb 7 7 on 12/15/22  · Not an epogen candidate due to active malignancy  DISPOSITION:  · Renal function remains stable  · Stable for discharge from renal standpoint  · Restart Furosemide and Veltassa upon discharge  · Check BMP 1 week after discharge  · Follow-up with Dr Siri Herrera of [facility] on discharge  SUBJECTIVE / 24H INTERVAL HISTORY:  No new complaints or acute issues overnight  Still on slow CBI  No CP or SOB  R hand still weak       OBJECTIVE:  Current Weight: Weight - Scale: 96 2 kg (212 lb)  Vitals:    12/17/22 1542 12/17/22 2145 12/17/22 2243 12/18/22 0714   BP: 127/75  124/71 148/84   Pulse: 76  87 84   Resp: 18  20    Temp: 97 7 °F (36 5 °C)  97 9 °F (36 6 °C) (!) 97 2 °F (36 2 °C)   TempSrc: SpO2: 98% 97% 97% 97%   Weight:       Height:           Intake/Output Summary (Last 24 hours) at 12/18/2022 0850  Last data filed at 12/18/2022 0300  Gross per 24 hour   Intake 460 ml   Output 1275 ml   Net -815 ml     General: conscious, cooperative, no distress  Skin: dry  Eyes: pale conjunctivae  ENT: moist mucous membranes  Respiratory: equal chest expansion, clear breath sounds  Cardiovascular: distinct heart sounds, normal rate, regular rhythm, no rub  Abdomen: soft, non tender, non distended, normal bowel sounds  Extremities: no edema  Genitourinary: (+) bilateral PCN, (+) wagner catheter  Neuro: awake, alert     Psych: appropriate affect    Medications:    Current Facility-Administered Medications:   •  acetaminophen (TYLENOL) tablet 650 mg, 650 mg, Oral, Q6H PRN, Ana María Smith MD, 650 mg at 12/18/22 0844  •  ARIPiprazole (ABILIFY) tablet 2 mg, 2 mg, Oral, Daily, Ana María Smith MD, 2 mg at 12/18/22 9318  •  azelastine (ASTELIN) 0 1 % nasal spray 1 spray, 1 spray, Each Nare, BID, Ana María Smith MD, 1 spray at 12/18/22 5407  •  bimatoprost (LUMIGAN) 0 01 % ophthalmic solution 1 drop, 1 drop, Both Eyes, HS, Ana María Smith MD, 1 drop at 12/17/22 2203  •  calcitriol (ROCALTROL) capsule 0 25 mcg, 0 25 mcg, Oral, Daily, Ana María Smith MD, 0 25 mcg at 12/18/22 5355  •  cefTRIAXone (ROCEPHIN) 1,000 mg in dextrose 5 % 50 mL IVPB, 1,000 mg, Intravenous, Q24H, Tonya Cody DO, Last Rate: 100 mL/hr at 12/17/22 1629, 1,000 mg at 12/17/22 1629  •  DULoxetine (CYMBALTA) delayed release capsule 30 mg, 30 mg, Oral, Daily, Ana María Smith MD, 30 mg at 12/18/22 8522  •  ezetimibe (ZETIA) tablet 10 mg, 10 mg, Oral, Daily, Ana María Smith MD, 10 mg at 12/18/22 2053  •  ferrous sulfate tablet 325 mg, 325 mg, Oral, Daily With Breakfast, Ana María Smith MD, 325 mg at 12/18/22 2397  •  fluticasone (FLONASE) 50 mcg/act nasal spray 1 spray, 1 spray, Nasal, Daily, Ana María Smith, MD, 1 spray at 12/18/22 4887  •  gabapentin (NEURONTIN) capsule 300 mg, 300 mg, Oral, HS, Gabriele Cavanaugh MD, 300 mg at 12/17/22 2159  •  HYDROmorphone (DILAUDID) injection 0 5 mg, 0 5 mg, Intravenous, Q4H PRN, Gabriele Cavanaugh MD, 0 5 mg at 12/17/22 0224  •  insulin glargine (LANTUS) subcutaneous injection 18 Units 0 18 mL, 18 Units, Subcutaneous, HS, Gabriele Cavanaugh MD, 18 Units at 12/17/22 2203  •  insulin lispro (HumaLOG) 100 units/mL subcutaneous injection 1-5 Units, 1-5 Units, Subcutaneous, HS, Gabriele Cavanaugh MD, 2 Units at 12/17/22 2204  •  insulin lispro (HumaLOG) 100 units/mL subcutaneous injection 1-6 Units, 1-6 Units, Subcutaneous, TID AC, 1 Units at 12/17/22 1727 **AND** Fingerstick Glucose (POCT), , , TID AC, Gabriele Cavanaugh MD  •  insulin lispro (HumaLOG) 100 units/mL subcutaneous injection 5 Units, 5 Units, Subcutaneous, TID With Meals, Gabriele Cavanaugh MD, 5 Units at 12/18/22 0839  •  isosorbide mononitrate (IMDUR) 24 hr tablet 30 mg, 30 mg, Oral, QAM, Gabriele Cavanaugh MD, 30 mg at 12/18/22 5719  •  metoprolol succinate (TOPROL-XL) 24 hr tablet 100 mg, 100 mg, Oral, Daily, Gabriele Cavanaugh MD, 100 mg at 12/18/22 0835  •  ondansetron (ZOFRAN) injection 4 mg, 4 mg, Intravenous, Q6H PRN, Gabriele Cavanaugh MD  •  oxybutynin (DITROPAN-XL) 24 hr tablet 10 mg, 10 mg, Oral, Daily, Gabriele Cavanaugh MD, 10 mg at 12/18/22 0831  •  oxyCODONE (ROXICODONE) immediate release tablet 10 mg, 10 mg, Oral, Q6H PRN, Gabriele Cavanaugh MD, 10 mg at 12/18/22 0448  •  oxyCODONE (ROXICODONE) IR tablet 5 mg, 5 mg, Oral, Q6H PRN, Gabriele Cavanaugh MD, 5 mg at 12/18/22 0331  •  pantoprazole (PROTONIX) EC tablet 40 mg, 40 mg, Oral, Early Morning, Gabriele Cavanaugh MD, 40 mg at 12/18/22 0447  •  polyethylene glycol (MIRALAX) packet 17 g, 17 g, Oral, Daily, Gabriele Cavanaugh MD, 17 g at 12/18/22 0831  •  senna (SENOKOT) tablet 17 2 mg, 2 tablet, Oral, BID, Gabriele Cavanaugh MD, 17 2 mg at 12/18/22 0834  •  tamsulosin (FLOMAX) capsule 0 4 mg, 0 4 mg, Oral, Daily With Dinner, Lexi Magana MD, 0 4 mg at 12/17/22 1629  •  zolpidem (AMBIEN) tablet 5 mg, 5 mg, Oral, HS PRN, Lexi Magana MD, 5 mg at 12/16/22 2135    Laboratory Results:  Results from last 7 days   Lab Units 12/18/22  0556 12/17/22  0725 12/16/22  0548 12/15/22  0539 12/14/22  1646 12/14/22  0610 12/13/22  0456 12/12/22  1358   WBC Thousand/uL 10 21*  --   --  11 67*  --  9 31 12 95* 16 61*   HEMOGLOBIN g/dL 7 7*  --   --  7 7* 7 1* 7 0* 7 9* 8 5*   HEMATOCRIT % 23 9*  --   --  24 0* 23 8* 22 1* 24 4* 26 7*   PLATELETS Thousands/uL 254  --   --  237  --  230 235 247   POTASSIUM mmol/L 4 0 4 3 4 1 3 9  --  4 0 3 8 3 7   CHLORIDE mmol/L 107 106 108 106  --  106 105 102   CO2 mmol/L 24 21 24 24  --  23 22 20*   BUN mg/dL 44* 49* 50* 49*  --  53* 50* 51*   CREATININE mg/dL 2 79* 3 33* 3 35* 3 48*  --  3 80* 3 46* 3 57*   CALCIUM mg/dL 8 8 9 0 8 6 9 0  --  9 0 9 1 9 4

## 2022-12-18 NOTE — ASSESSMENT & PLAN NOTE
Lab Results   Component Value Date    EGFR 20 12/18/2022    EGFR 16 12/17/2022    EGFR 16 12/16/2022    CREATININE 2 79 (H) 12/18/2022    CREATININE 3 33 (H) 12/17/2022    CREATININE 3 35 (H) 12/16/2022   · Baseline creatinine this year appears to be low to mid-3s  · Monitor renal function  · Consider Nephro consult

## 2022-12-19 ENCOUNTER — APPOINTMENT (OUTPATIENT)
Dept: PHYSICAL THERAPY | Facility: REHABILITATION | Age: 77
End: 2022-12-19

## 2022-12-19 ENCOUNTER — APPOINTMENT (INPATIENT)
Dept: RADIOLOGY | Facility: HOSPITAL | Age: 77
End: 2022-12-19
Attending: INTERNAL MEDICINE

## 2022-12-19 LAB
ANION GAP SERPL CALCULATED.3IONS-SCNC: 5 MMOL/L (ref 4–13)
BUN SERPL-MCNC: 42 MG/DL (ref 5–25)
CALCIUM SERPL-MCNC: 9 MG/DL (ref 8.3–10.1)
CHLORIDE SERPL-SCNC: 108 MMOL/L (ref 96–108)
CO2 SERPL-SCNC: 25 MMOL/L (ref 21–32)
CREAT SERPL-MCNC: 2.73 MG/DL (ref 0.6–1.3)
GFR SERPL CREATININE-BSD FRML MDRD: 21 ML/MIN/1.73SQ M
GLUCOSE SERPL-MCNC: 173 MG/DL (ref 65–140)
GLUCOSE SERPL-MCNC: 182 MG/DL (ref 65–140)
GLUCOSE SERPL-MCNC: 190 MG/DL (ref 65–140)
GLUCOSE SERPL-MCNC: 231 MG/DL (ref 65–140)
GLUCOSE SERPL-MCNC: 78 MG/DL (ref 65–140)
GLUCOSE SERPL-MCNC: 97 MG/DL (ref 65–140)
PHOSPHATE SERPL-MCNC: 3.4 MG/DL (ref 2.3–4.1)
POTASSIUM SERPL-SCNC: 4.5 MMOL/L (ref 3.5–5.3)
SODIUM SERPL-SCNC: 138 MMOL/L (ref 135–147)

## 2022-12-19 RX ORDER — OXYCODONE HYDROCHLORIDE 5 MG/1
2.5 TABLET ORAL EVERY 6 HOURS PRN
Status: DISCONTINUED | OUTPATIENT
Start: 2022-12-19 | End: 2022-12-22 | Stop reason: HOSPADM

## 2022-12-19 RX ORDER — OXYCODONE HYDROCHLORIDE 5 MG/1
5 TABLET ORAL EVERY 6 HOURS PRN
Status: DISCONTINUED | OUTPATIENT
Start: 2022-12-19 | End: 2022-12-22 | Stop reason: HOSPADM

## 2022-12-19 RX ORDER — HYDROMORPHONE HCL IN WATER/PF 6 MG/30 ML
0.2 PATIENT CONTROLLED ANALGESIA SYRINGE INTRAVENOUS EVERY 4 HOURS PRN
Status: DISCONTINUED | OUTPATIENT
Start: 2022-12-19 | End: 2022-12-22 | Stop reason: HOSPADM

## 2022-12-19 RX ADMIN — DULOXETINE 30 MG: 30 CAPSULE, DELAYED RELEASE ORAL at 08:01

## 2022-12-19 RX ADMIN — PANTOPRAZOLE SODIUM 40 MG: 40 TABLET, DELAYED RELEASE ORAL at 06:33

## 2022-12-19 RX ADMIN — INSULIN LISPRO 1 UNITS: 100 INJECTION, SOLUTION INTRAVENOUS; SUBCUTANEOUS at 16:43

## 2022-12-19 RX ADMIN — CALCITRIOL CAPSULES 0.25 MCG 0.25 MCG: 0.25 CAPSULE ORAL at 08:01

## 2022-12-19 RX ADMIN — ISOSORBIDE MONONITRATE 30 MG: 30 TABLET, EXTENDED RELEASE ORAL at 08:01

## 2022-12-19 RX ADMIN — CEFTRIAXONE SODIUM 1000 MG: 10 INJECTION, POWDER, FOR SOLUTION INTRAVENOUS at 16:42

## 2022-12-19 RX ADMIN — OXYBUTYNIN 10 MG: 10 TABLET, FILM COATED, EXTENDED RELEASE ORAL at 08:01

## 2022-12-19 RX ADMIN — INSULIN LISPRO 1 UNITS: 100 INJECTION, SOLUTION INTRAVENOUS; SUBCUTANEOUS at 12:59

## 2022-12-19 RX ADMIN — AZELASTINE HYDROCHLORIDE 1 SPRAY: 137 SPRAY, METERED NASAL at 08:05

## 2022-12-19 RX ADMIN — OXYCODONE HYDROCHLORIDE 10 MG: 10 TABLET ORAL at 06:32

## 2022-12-19 RX ADMIN — GABAPENTIN 300 MG: 300 CAPSULE ORAL at 23:48

## 2022-12-19 RX ADMIN — INSULIN LISPRO 5 UNITS: 100 INJECTION, SOLUTION INTRAVENOUS; SUBCUTANEOUS at 16:43

## 2022-12-19 RX ADMIN — BIMATOPROST 1 DROP: 0.1 SOLUTION/ DROPS OPHTHALMIC at 23:49

## 2022-12-19 RX ADMIN — FERROUS SULFATE TAB 325 MG (65 MG ELEMENTAL FE) 325 MG: 325 (65 FE) TAB at 08:01

## 2022-12-19 RX ADMIN — INSULIN LISPRO 1 UNITS: 100 INJECTION, SOLUTION INTRAVENOUS; SUBCUTANEOUS at 23:48

## 2022-12-19 RX ADMIN — INSULIN LISPRO 5 UNITS: 100 INJECTION, SOLUTION INTRAVENOUS; SUBCUTANEOUS at 08:04

## 2022-12-19 RX ADMIN — EZETIMIBE 10 MG: 10 TABLET ORAL at 08:01

## 2022-12-19 RX ADMIN — OXYCODONE HYDROCHLORIDE 10 MG: 10 TABLET ORAL at 13:22

## 2022-12-19 RX ADMIN — INSULIN GLARGINE 18 UNITS: 100 INJECTION, SOLUTION SUBCUTANEOUS at 23:48

## 2022-12-19 RX ADMIN — HYDROMORPHONE HYDROCHLORIDE 0.5 MG: 1 INJECTION, SOLUTION INTRAMUSCULAR; INTRAVENOUS; SUBCUTANEOUS at 08:01

## 2022-12-19 RX ADMIN — FLUTICASONE PROPIONATE 1 SPRAY: 50 SPRAY, METERED NASAL at 08:02

## 2022-12-19 RX ADMIN — METOPROLOL SUCCINATE 100 MG: 100 TABLET, EXTENDED RELEASE ORAL at 08:01

## 2022-12-19 RX ADMIN — INSULIN LISPRO 5 UNITS: 100 INJECTION, SOLUTION INTRAVENOUS; SUBCUTANEOUS at 13:00

## 2022-12-19 RX ADMIN — TAMSULOSIN HYDROCHLORIDE 0.4 MG: 0.4 CAPSULE ORAL at 17:45

## 2022-12-19 RX ADMIN — ARIPIPRAZOLE 2 MG: 2 TABLET ORAL at 08:05

## 2022-12-19 NOTE — PROGRESS NOTES
Extensive discussion with patient's granddaughter  Concern over polypharmacy  We will attempt to reduce dosing of pain meds including Dilaudid and oxycodone  Discussed antibiotic regimen and concern for possible colonization associated with Enterococcus  Continue with Rocephin for 3 more days  Discussed patient's swelling in right arm  We will ask IV to be changed to left arm  Will also follow-up on Doppler of upper extremity

## 2022-12-19 NOTE — ASSESSMENT & PLAN NOTE
Lab Results   Component Value Date    HGBA1C 7 2 (H) 10/20/2022       Recent Labs     12/18/22  1600 12/18/22  2101 12/19/22  0802 12/19/22  1107   POCGLU 151* 134 97 182*       Blood Sugar Average: Last 72 hrs:  (P) 177 [id number]     · Continue home insulin lantus 18 units at bedtime, Humalog 5 units TID   · Sliding scale

## 2022-12-19 NOTE — PROGRESS NOTES
[address] [facility]  Progress Note - Nicole Bernstein 1945, 76 y o  male MRN: [id number]  Unit/Bed#: [facility] 917-01 Encounter: [id number]  Primary Care Provider: Jessee Barber MD   Date and time admitted to hospital: 12/12/2022  3:38 PM    * Hematuria  Assessment & Plan  · Acute on chronic  · CT on admission revealed stable bladder wall thickening toward the left compatible with known neoplasm, new right hydronephrosis with high density intraluminal material in the distal right ureter likely representing blood, stable metastatic retroperitoneal lymphadenopathy, and 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  · Note uro input     · Patient with bilateral nephrostomy tubes and had a Shirley placed in ED    Right wrist drop  Assessment & Plan  Follow-up on imaging rule out fracture  neg    UTI (urinary tract infection)  Assessment & Plan  · PMH of Recurrent UTI on suppressive bactrim at home, presented with UTI     Will need to hold Bactrim given acute on chronic renal failure  · Was placed on cefepime on admission  Prelim urine culture growing >100,000 gram negative rebeca  · Culture sensitivities show: Enterobacter and Enterococcus  · De-escalate to Rocephin  3 more days and then stop  · Possible colonization with Enterococcus given patient without fever spike despite resistance pattern  · White count stable  Afebrile  SOB (shortness of breath)  Assessment & Plan  Known history of Pleurx  Will follow-up on chest x-ray    CKD (chronic kidney disease)  Assessment & Plan  Lab Results   Component Value Date    EGFR 21 12/19/2022    EGFR 20 12/18/2022    EGFR 16 12/17/2022    CREATININE 2 73 (H) 12/19/2022    CREATININE 2 79 (H) 12/18/2022    CREATININE 3 33 (H) 12/17/2022   · Baseline creatinine this year appears to be low to mid-3s  · Monitor renal function  · Consider Nephro consult     Chronic pain syndrome  Assessment & Plan  · Pain control with oxycodone at home    · Pain control       Moderate major depression, single episode ([facility] [address] )  Assessment & Plan  · Continue home meds    PAD (peripheral artery disease) (HCC)  Assessment & Plan  · Aspirin on hold as above    Type 2 diabetes mellitus, with long-term current use of insulin [facility])  Assessment & Plan  Lab Results   Component Value Date    HGBA1C 7 2 (H) 10/20/2022       Recent Labs     22  1600 22  2101 22  0802 22  1107   POCGLU 151* 134 97 182*       Blood Sugar Average: Last 72 hrs:  (P) 177 [id number]     · Continue home insulin lantus 18 units at bedtime, Humalog 5 units TID   · Sliding scale    Hypertension with renal disease  Assessment & Plan  · Continue metoprolol, imdur  · Lasix on hold given rise in creatinine   · Avoid hypotension     Coronary artery disease of native artery of native heart with stable angina pectoris (HCC)  Assessment & Plan  · Continue metoprolol and isosorbide  · Hold aspirin due to hematuria      VTE Pharmacologic Prophylaxis:   Pharmacologic: hematuria  Mechanical VTE Prophylaxis in Place: No    Patient Centered Rounds: I have performed bedside rounds with nursing staff today  Time Spent for Care: 15 minutes  More than 50% of total time spent on counseling and coordination of care as described above  Current Length of Stay: 7 day(s)    Current Patient Status: Inpatient       Code Status: Level 1 - Full Code      Subjective:   nad    Objective:     Vitals:   Temp (24hrs), Av 4 °F (36 3 °C), Min:96 8 °F (36 °C), Max:98 1 °F (36 7 °C)    Temp:  [96 8 °F (36 °C)-98 1 °F (36 7 °C)] 97 2 °F (36 2 °C)  HR:  [75-94] 79  Resp:  [15-18] 18  BP: ()/(63-80) 96/64  SpO2:  [96 %-100 %] 96 %  Body mass index is 26 32 kg/m²  Input and Output Summary (last 24 hours):        Intake/Output Summary (Last 24 hours) at 2022 1511  Last data filed at 2022 1246  Gross per 24 hour   Intake 540 ml   Output 350 ml   Net 190 ml       Physical Exam:     Physical Exam  Constitutional:       Appearance: Normal appearance  HENT:      Head: Normocephalic and atraumatic  Cardiovascular:      Rate and Rhythm: Normal rate and regular rhythm  Pulmonary:      Effort: Pulmonary effort is normal       Breath sounds: Normal breath sounds  Abdominal:      General: Abdomen is flat  There is no distension  Palpations: Abdomen is soft  Skin:     General: Skin is warm and dry  Coloration: Skin is not jaundiced  Neurological:      General: No focal deficit present  Mental Status: He is alert and oriented to person, place, and time  Psychiatric:         Behavior: Behavior normal          Additional Data:     Labs:    Results from last 7 days   Lab Units 12/18/22  0556   WBC Thousand/uL 10 21*   HEMOGLOBIN g/dL 7 7*   HEMATOCRIT % 23 9*   PLATELETS Thousands/uL 254   NEUTROS PCT % 78*   LYMPHS PCT % 11*   MONOS PCT % 8   EOS PCT % 2     Results from last 7 days   Lab Units 12/19/22  0550   POTASSIUM mmol/L 4 5   CHLORIDE mmol/L 108   CO2 mmol/L 25   BUN mg/dL 42*   CREATININE mg/dL 2 73*   CALCIUM mg/dL 9 0           * I Have Reviewed All Lab Data Listed Above  * Additional Pertinent Lab Tests Reviewed:  All Labs Within Last 24 Hours Reviewed        Recent Cultures (last 7 days):           Last 24 Hours Medication List:   Current Facility-Administered Medications   Medication Dose Route Frequency Provider Last Rate   • acetaminophen  650 mg Oral Q6H PRN Karen Browning MD     • ARIPiprazole  2 mg Oral Daily Karen Browning MD     • azelastine  1 spray Each Nare BID Karen Browning MD     • bimatoprost  1 drop Both Eyes HS Karen Browning MD     • calcitriol  0 25 mcg Oral Daily Karen Browning MD     • DULoxetine  30 mg Oral Daily Karen Browning MD     • ezetimibe  10 mg Oral Daily Karen Browning MD     • ferrous sulfate  325 mg Oral Daily With Breakfast Karen Browning MD     • fluticasone  1 spray Nasal Daily Amanda Ba Kristi Rojas MD     • gabapentin  300 mg Oral HS Karen Browning MD     • HYDROmorphone  0 5 mg Intravenous Q4H PRN Karen Browning MD     • insulin glargine  18 Units Subcutaneous HS Karen Browning MD     • insulin lispro  1-5 Units Subcutaneous HS Karen Browning MD     • insulin lispro  1-6 Units Subcutaneous TID AC Karen Browning MD     • insulin lispro  5 Units Subcutaneous TID With Meals Karen Browning MD     • isosorbide mononitrate  30 mg Oral QAM Karen Browning MD     • metoprolol succinate  100 mg Oral Daily Karen Browning MD     • ondansetron  4 mg Intravenous Q6H PRN Karen Browning MD     • oxybutynin  10 mg Oral Daily Karen Browning MD     • oxyCODONE  10 mg Oral Q6H PRN Karen Browning MD     • oxyCODONE  5 mg Oral Q6H PRN Karen Browning MD     • pantoprazole  40 mg Oral Early Morning Karen Browning MD     • polyethylene glycol  17 g Oral Daily Karen Browning MD     • senna  2 tablet Oral BID Karen Browning MD     • tamsulosin  0 4 mg Oral Daily With Jaime Foote MD     • zolpidem  5 mg Oral HS PRN Karen Browning MD          Today, Patient Was Seen By: Caitie Choi DO    ** Please Note: Dictation voice to text software may have been used in the creation of this document   **

## 2022-12-19 NOTE — CASE MANAGEMENT
Case Management Discharge Planning Note    Patient name Pascal Spatz  [facility] [id number]  : 1945 Date 2022       Current Admission Date: 2022  Current Admission Diagnosis:Hematuria   Patient Active Problem List    Diagnosis Date Noted   • Right wrist drop 2022   • Recurrent left pleural effusion 2022   • UTI (urinary tract infection) 2022   • Shortness of breath 2022   • History of tobacco use disorder 2022   • Retroperitoneal lymphadenopathy 2022   • Pulmonary nodules 2022   • Syncope and collapse 2022   • Depression with suicidal ideation 2022   • Cancer related pain 2022   • Bilateral hydronephrosis 04/10/2022   • CKD (chronic kidney disease) 2022   • Fall 2022   • Hyperkalemia 2022   • Retained ureteral stent 730   • 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[facility] [address] ) 2022   • Embolism and 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 ([address] ) 2022   • Abnormal CT scan, bladder 2022   • Port-A-Cath in place 2022   • Continuous opioid dependence ([address] ) 2021   • Hematuria 10/24/2021   • Acute urinary retention 10/21/2021   • Chronic pain syndrome 2021   • Lumbar spondylosis    • Chronic bronchitis ([address] ) 2021   • Moderate major depression, single episode ([address] ) 2021   • Thrombocytopenia ([address] ) 2021   • 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 2021   • Gross hematuria 2021   • PAD (peripheral artery disease) ([facility])    • Left carotid bruit    • Anemia of chronic disease 2020   • Preoperative clearance 2020   • Chronic anemia 10/10/2020   • Diabetic ulcer of left foot associated with type 2 diabetes mellitus, 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 ([address] ) 10/08/2020   • Acute kidney injury superimposed on CKD [facility])    • Dysuria 2020   • Diabetic polyneuropathy associated with type 2 diabetes mellitus ([facility] ) 2020   • Abnormal MRI, lumbar spine 06/29/2020   • Malignant neoplasm of anterior wall of urinary bladder ([facility] [address] )    • Acute renal failure (ARF) ([address] ) 02/12/2020   • Secondary renal hyperparathyroidism ([facility] [address] ) 02/12/2020   • Preop examination 04/16/2019   • Superficial phlebitis 03/07/2019   • Arteriosclerosis of artery of extremity ([facility] [address] ) 02/22/2019   • Stable angina pectoris ([facility] [address] ) 02/22/2019   • Herpes zoster without complication 38/83/7276   • Localized edema 02/22/2019   • Back pain 01/31/2019   • Degeneration of lumbar intervertebral disc 12/03/2018   • Primary osteoarthritis of left knee 03/16/2018   • Bladder carcinoma ([facility] [address] ) 08/16/2016   • Presence of stent in coronary artery 08/16/2016   • Hypertension with renal disease 10/29/2015   • Hyperlipidemia 10/29/2015   • Cystitis due to intravesical BCG administration 09/24/2015   • Coronary artery disease of native artery of native heart with stable angina pectoris ([facility] ) 05/19/2011   • Type 2 diabetes mellitus, with long-term current use of insulin ([facility] ) 01/09/2004      LOS (days): 7  Geometric Mean LOS (GMLOS) (days): 3 00  Days to GMLOS:-3 7     OBJECTIVE:  Risk of Unplanned Readmission Score: 58 59         Current admission status: Inpatient   Preferred Pharmacy:   [facility] [address] [facility] [address], [address] [place] [id number]  Phone: [phone] Fax: [phone]    Primary Care Provider: Zachariah Ferro MD    Primary Insurance: MEDICARE  Secondary Insurance: BLUE CROSS    DISCHARGE DETAILS:    Discharge planning discussed with[de-identified] Patient and his grand daughter Natty Plasencia of Choice: Yes     CM contacted family/caregiver?: Yes  Were Treatment Team discharge recommendations reviewed with patient/caregiver?: Yes  Did patient/caregiver verbalize understanding of patient care needs?: Yes  Were patient/caregiver advised of the risks associated with not following Treatment Team discharge recommendations?: Yes    Contacts  Patient Contacts: Rod Vera (grand daughter)  Relationship to Patient[de-identified] Family  Contact Method: In Person  Reason/Outcome: Continuity of Care, Referral, Emergency Contact, Discharge [address]         Is the patient interested in Roberto Salinas at discharge?: Yes  [address] requested[de-identified] Occupational Therapy, Physical Therapy, [address] Name[de-identified] [facility] Provider[de-identified] PCP  Home Health Services Needed[de-identified] Evaluate Functional Status and Safety, Gait/ADL Training, Strengthening/Theraputic Exercises to Improve Function, Urinary Incontinence Catheter Management, Post-Op Care and Assessment (b/l nephrostomy tubes)  Homebound Criteria Met[de-identified] Uses an Assist Device (i e  cane, walker, etc), Requires the Assistance of Another Person for Safe Ambulation or to Leave the Home  Supporting Clincal Findings[de-identified] Fatigues Easliy in [employer], Limited Endurance         Other Referral/Resources/Interventions Provided:  Referral Comments: Patient at this time is currently recommended for STR at time of discharge  Spoke with both the patient and his grand dapraful Susan at bedside  Patient is in agreement with STR at this time and has accepted the bed at [facility]  Family however prefers home if possible, but understands that he may need STR instead  At this time Ric[facility] Webster has accepted the patient for time of discharge, however [facility] has also accepted  At this time, all parties are in agreement with Mane Gaona for time of discharge in the event that STR continues to be recommended upon time of discharge           Treatment Team Recommendation: Short Term Rehab  Discharge Destination Plan[de-identified] Short Term Rehab  Transport at Discharge : BLS Ambulance           IMM Given (Date):: 12/19/22  IMM Given to[de-identified] Patient  Family notified[de-identified] Sandeep Acosta at bedside as well (grand daughter)  Additional Comments: Spoke with attending, currently awaiting clearance from Urology at this time prior to d/c

## 2022-12-19 NOTE — ASSESSMENT & PLAN NOTE
· PMH of Recurrent UTI on suppressive bactrim at home, presented with UTI     Will need to hold Bactrim given acute on chronic renal failure  · Was placed on cefepime on admission  Prelim urine culture growing >100,000 gram negative rebeca  · Culture sensitivities show: Enterobacter and Enterococcus  · De-escalate to Rocephin  · Possible colonization with Enterococcus given patient without fever spike despite resistance pattern  · White count stable  Afebrile

## 2022-12-19 NOTE — PROGRESS NOTES
NEPHROLOGY PROGRESS NOTE   Brandee Desouza 76 y o  male MRN: [id number]  Unit/Bed#: [facility] 917-01 Encounter: [id number]    ASSESSMENT & PLAN:  68-year-old with history of bladder cancer status post bilateral nephrostomy tube was admitted for gross hematuria and bloody drainage from nephrostomy tube  CT was suggestive of right-sided hydronephrosis  Urology was consulted and underwent right ureteral stent placement  Nephrology consulted for CKD management    Chronic kidney disease stage IV  -Baseline creatinine 3 0-3 6  -Follows with [facility] Dr Kayleen Vazquez  -Admission creatinine was 3 57 on 12/12  -Renal function has improved to creatinine 2 7 and stable for last 2 days currently renal function improved below previous baseline  We will continue to monitor  -Recommend restarting Lasix as well as Veltassa at home dose at the time of discharge and check BMP in 1 week after discharge, would recommend follow-up with his outpatient nephrologist at [facility]    History of obstructive uropathy status post chronic bilateral PCN and now found to have right-sided hydronephrosis on CT on admission on 12/12, status post cystoscopy and right ureteral stent placement on 12/14/22  Continue management as per urology     Primary hypertension  -Blood pressure stable  -Currently on metoprolol 100 mg and isosorbide 30 mg daily  -Continue to hold Lasix 20 mg every other day  May restart on discharge        Gross hematuria/history of bladder cancer  -Status post cystoscopy and fulguration of bleeding points plus evacuation of clots on 12/4  -Currently on CBI per urology, follow-up urology recommendations  -Noted plan for outpatient follow-up and cystoscopy, fulguration and TURBT as outpatient in 3 months as well as plan for right ureteral stent exchange at the same time    History of hyperkalemia  -As outpatient on Veltassa every other day, recommend restarting at the time of discharge, continue low potassium diet during inpatient stay, potassium level currently normal range    Secondary hyperparathyroidism of renal origin continue calcitriol 0 25 mcg daily, monitor PTH level as outpatient    Anemia  -Not on Epogen due to active malignancy  -Hemoglobin currently at 7 7 g/dL  Continue to monitor per primary team and recommend PRBC if it drops below 7   -On oral ferrous sulfate  -check iron stores     Discussed with Primary Team       SUBJECTIVE:  No new complaints   Still on CBI and urine pinkish    OBJECTIVE:  Current Weight: Weight - Scale: 95 5 kg (210 lb 8 6 oz)  Vitals:    12/19/22 0804   BP: 123/80   Pulse: 93   Resp:    Temp: 98 1 °F (36 7 °C)   SpO2: 96%       Intake/Output Summary (Last 24 hours) at 12/19/2022 0941  Last data filed at 12/19/2022 0600  Gross per 24 hour   Intake 320 ml   Output 830 ml   Net -510 ml       Physical Exam  General:  Ill looking, awake  Eyes: Conjunctivae pink,  Sclera anicteric  ENT: lips and mucous membranes moist  Neck: supple   Chest: Clear to Auscultation both lungs,  no crackles, ronchus or wheezing  CVS: S1 & S2 present, normal rate, regular rhythm, no murmur    Abdomen: soft, non-tender, non-distended, Bowel sounds normoactive  Extremities: no edema of  legs  Skin: no rash  Neuro: awake, alert, oriented x 3   Psych: Mood and affect appropriate   - on CBI and having pinkish urine     Medications:    Current Facility-Administered Medications:   •  acetaminophen (TYLENOL) tablet 650 mg, 650 mg, Oral, Q6H PRN, Ellen High MD, 650 mg at 12/18/22 0844  •  ARIPiprazole (ABILIFY) tablet 2 mg, 2 mg, Oral, Daily, Ellen High MD, 2 mg at 12/19/22 0805  •  azelastine (ASTELIN) 0 1 % nasal spray 1 spray, 1 spray, Each Nare, BID, Ellen High MD, 1 spray at 12/19/22 0805  •  bimatoprost (LUMIGAN) 0 01 % ophthalmic solution 1 drop, 1 drop, Both Eyes, HS, Ellen High MD, 1 drop at 12/18/22 2307  •  calcitriol (ROCALTROL) capsule 0 25 mcg, 0 25 mcg, Oral, Daily, Roberto Bar Marko Roth MD, 0 25 mcg at 12/19/22 0801  •  DULoxetine (CYMBALTA) delayed release capsule 30 mg, 30 mg, Oral, Daily, Brittney Man MD, 30 mg at 12/19/22 0801  •  ezetimibe (ZETIA) tablet 10 mg, 10 mg, Oral, Daily, Brittney Man MD, 10 mg at 12/19/22 2732  •  ferrous sulfate tablet 325 mg, 325 mg, Oral, Daily With Breakfast, Brittney Man MD, 325 mg at 12/19/22 0801  •  fluticasone (FLONASE) 50 mcg/act nasal spray 1 spray, 1 spray, Nasal, Daily, Brittney Man MD, 1 spray at 12/19/22 0802  •  gabapentin (NEURONTIN) capsule 300 mg, 300 mg, Oral, HS, Brittney Man MD, 300 mg at 12/18/22 2307  •  HYDROmorphone (DILAUDID) injection 0 5 mg, 0 5 mg, Intravenous, Q4H PRN, Brittney Man MD, 0 5 mg at 12/19/22 0801  •  insulin glargine (LANTUS) subcutaneous injection 18 Units 0 18 mL, 18 Units, Subcutaneous, HS, Brittney Man MD, 18 Units at 12/18/22 2307  •  insulin lispro (HumaLOG) 100 units/mL subcutaneous injection 1-5 Units, 1-5 Units, Subcutaneous, HS, Brittney Man MD, 2 Units at 12/17/22 2204  •  insulin lispro (HumaLOG) 100 units/mL subcutaneous injection 1-6 Units, 1-6 Units, Subcutaneous, TID AC, 1 Units at 12/18/22 1721 **AND** Fingerstick Glucose (POCT), , , TID AC, Brittney Man MD  •  insulin lispro (HumaLOG) 100 units/mL subcutaneous injection 5 Units, 5 Units, Subcutaneous, TID With Meals, Brittney Man MD, 5 Units at 12/19/22 0804  •  isosorbide mononitrate (IMDUR) 24 hr tablet 30 mg, 30 mg, Oral, QAM, Brittney Man MD, 30 mg at 12/19/22 0801  •  metoprolol succinate (TOPROL-XL) 24 hr tablet 100 mg, 100 mg, Oral, Daily, Brittney Man MD, 100 mg at 12/19/22 0801  •  ondansetron (ZOFRAN) injection 4 mg, 4 mg, Intravenous, Q6H PRN, Brittney Man MD  •  oxybutynin (DITROPAN-XL) 24 hr tablet 10 mg, 10 mg, Oral, Daily, Brittney Man MD, 10 mg at 12/19/22 0801  •  oxyCODONE (ROXICODONE) immediate release tablet 10 mg, 10 mg, Oral, Q6H PRN, Jasen Mejia MD, 10 mg at 12/19/22 9230  •  oxyCODONE (ROXICODONE) IR tablet 5 mg, 5 mg, Oral, Q6H PRN, Jasen Mejia MD, 5 mg at 12/18/22 2837  •  pantoprazole (PROTONIX) EC tablet 40 mg, 40 mg, Oral, Early Morning, Jasen Mejia MD, 40 mg at 12/19/22 7965  •  polyethylene glycol (MIRALAX) packet 17 g, 17 g, Oral, Daily, Jasen Mejia MD, 17 g at 12/18/22 0831  •  senna (SENOKOT) tablet 17 2 mg, 2 tablet, Oral, BID, Jasen Mejia MD, 17 2 mg at 12/18/22 5027  •  tamsulosin (FLOMAX) capsule 0 4 mg, 0 4 mg, Oral, Daily With Dinner, Jasen Mejia MD, 0 4 mg at 12/18/22 1719  •  zolpidem (AMBIEN) tablet 5 mg, 5 mg, Oral, HS PRN, Jasen Mejia MD, 5 mg at 12/16/22 2135    Invasive Devices:        Lab Results:   Results from last 7 days   Lab Units 12/19/22  0550 12/18/22  0556 12/17/22  0725 12/16/22  0548 12/15/22  0539 12/14/22  1646 12/14/22  0610 12/13/22  0456 12/12/22  1358   WBC Thousand/uL  --  10 21*  --   --  11 67*  --  9 31   < > 16 61*   HEMOGLOBIN g/dL  --  7 7*  --   --  7 7* 7 1* 7 0*   < > 8 5*   HEMATOCRIT %  --  23 9*  --   --  24 0* 23 8* 22 1*   < > 26 7*   PLATELETS Thousands/uL  --  254  --   --  237  --  230   < > 247   POTASSIUM mmol/L 4 5 4 0 4 3   < > 3 9  --  4 0   < > 3 7   CHLORIDE mmol/L 108 107 106   < > 106  --  106   < > 102   CO2 mmol/L 25 24 21   < > 24  --  23   < > 20*   BUN mg/dL 42* 44* 49*   < > 49*  --  53*   < > 51*   CREATININE mg/dL 2 73* 2 79* 3 33*   < > 3 48*  --  3 80*   < > 3 57*   CALCIUM mg/dL 9 0 8 8 9 0   < > 9 0  --  9 0   < > 9 4   PHOSPHORUS mg/dL 3 4  --   --   --   --   --   --   --   --    ALK PHOS U/L  --   --   --   --   --   --   --   --  114   ALT U/L  --   --   --   --   --   --   --   --  21   AST U/L  --   --   --   --   --   --   --   --  19    < > = values in this interval not displayed         Previous work up:     Reviewed CT abdomen pelvis report from 12/12 2 showed stable bladder wall thickening and compatible with known neoplasm     -new right-sided hydronephrosis and stable retroperitoneal lymphadenopathy as well as left pleural effusion and epicardial metastasis    Portions of the record may have been created with voice recognition software  Occasional wrong word or "sound a like" substitutions may have occurred due to the inherent limitations of voice recognition software  Read the chart carefully and recognize, using context, where substitutions have occurred  If you have any questions, please contact the dictating provider

## 2022-12-19 NOTE — PROGRESS NOTES
Progress Note -Urology   Lisset Sat 76 y o  male MRN: [id number]  Unit/Bed#: [facility] 917-01 Encounter: [id number]    Assessment & Plan:  64-year-old male with a history of metastatic urothelial carcinoma of the bladder presenting with gross hematuria now status post cystoscopy clot EVAC and fulguration retrograde pyelogram on the right with right ureteral stent insertion:    -CBI clamped yesterday, reevaluate today urine continues to be light pink in color, manual irrigation revealed removal of mild amount of clot urine continues to be clear to lightly pink-tinged later this afternoon   -Hemoglobin continues to be stable at 7 7  -Positive urinary tract infection initially treated with cefepime and changed to ceftriaxone based off of culture data, possible colonization Enterococcus, medicine team holding off on treatment as patient currently has no leukocytosis, afebrile and asymptomatic   -Continue bilateral nephrostomy tubes to drainage, flush daily and cc normal saline  -We will plan for outpatient reevaluation with patient's primary urologist for cystoscopy, possible TURBT and ureteral stent exchange on the right  Discussed with patient's granddaughter at bedside    -Urethral Shirley catheter may be removed prior to discharge, current planning for rehab    No further  intervention required this admission  Urology will sign off  We will continue to plan for outpatient follow-up as discussed above  Patient may have Shirley catheter removed prior to discharge  Expect to have mild pink-tinged colored urine patient has ureteral stent in place  Subjective/Objective   Chief Complaint: None    Subjective:   Currently sitting comfortably in chair in no acute distress reporting that he is having occasional bladder spasms  Denies any nausea, vomiting fevers, chills, flank pain or abdominal pain    Objective:     Blood pressure 96/64, pulse 79, temperature (!) 97 2 °F (36 2 °C), resp   rate 18, height 6' 3" (1 905 m), weight 95 5 kg (210 lb 8 6 oz), SpO2 96 %  ,Body mass index is 26 32 kg/m²  Intake/Output Summary (Last 24 hours) at 12/19/2022 1513  Last data filed at 12/19/2022 1246  Gross per 24 hour   Intake 540 ml   Output 350 ml   Net 190 ml       Invasive Devices     Central Venous Catheter Line  Duration           Port A Cath Right Chest -- days          Peripheral Intravenous Line  Duration           Peripheral IV 12/17/22 Distal;Right;Upper;Ventral (anterior) Arm 2 days          Drain  Duration           Nephrostomy Left 10 2 Fr  34 days    Nephrostomy Right 10 2 Fr  34 days    Continuous Bladder Irrigation Three-way 5 days                Physical Exam  Constitutional:       General: He is not in acute distress  Appearance: He is normal weight  He is not ill-appearing, toxic-appearing or diaphoretic  HENT:      Head: Normocephalic and atraumatic  Right Ear: External ear normal       Left Ear: External ear normal       Nose: Nose normal       Mouth/Throat:      Pharynx: Oropharynx is clear  Eyes:      General: No scleral icterus  Conjunctiva/sclera: Conjunctivae normal    Cardiovascular:      Rate and Rhythm: Normal rate and regular rhythm  Pulses: Normal pulses  Heart sounds: No murmur heard  No friction rub  No gallop  Pulmonary:      Effort: Pulmonary effort is normal  No respiratory distress  Breath sounds: No wheezing, rhonchi or rales  Abdominal:      General: Bowel sounds are normal  There is no distension  Tenderness: There is no abdominal tenderness  Genitourinary:     Comments: Shirley catheter in place, draining light pink-tinged colored urine, manual irrigation reveals immediate clearing removal of small clots  No signs of active bleeding  Musculoskeletal:      Cervical back: Normal range of motion  Neurological:      Mental Status: He is alert  Mental status is at baseline             Lab, Imaging and other studies:I have personally reviewed pertinent lab results     Lab Results   Component Value Date    WBC 10 21 (H) 12/18/2022    HGB 7 7 (L) 12/18/2022    HCT 23 9 (L) 12/18/2022    MCV 98 12/18/2022     12/18/2022     Lab Results   Component Value Date    SODIUM 138 12/19/2022    K 4 5 12/19/2022     12/19/2022    CO2 25 12/19/2022    BUN 42 (H) 12/19/2022    CREATININE 2 73 (H) 12/19/2022    GLUC 78 12/19/2022    CALCIUM 9 0 12/19/2022        VTE Pharmacologic Prophylaxis: Reason for no pharmacologic prophylaxis Hematuria  VTE Mechanical Prophylaxis: sequential compression device    Suraj Pappas PA-C

## 2022-12-19 NOTE — ASSESSMENT & PLAN NOTE
Lab Results   Component Value Date    EGFR 21 12/19/2022    EGFR 20 12/18/2022    EGFR 16 12/17/2022    CREATININE 2 73 (H) 12/19/2022    CREATININE 2 79 (H) 12/18/2022    CREATININE 3 33 (H) 12/17/2022   · Baseline creatinine this year appears to be low to mid-3s  · Monitor renal function  · Consider Nephro consult

## 2022-12-19 NOTE — RESTORATIVE TECHNICIAN NOTE
Restorative Technician Note      Patient Name: Eloy Green     Restorative Tech Visit Date: 12/19/22  Note Type: Mobility  Patient Position Upon Consult: Supine  Activity Performed: Ambulated  Assistive Device: Roller walker; Other (Comment) (Ax2 to stand)  Brace Applied: Other (Comment) (R resting hand)  Patient Position When Brace Applied: Seated  Patient Position at End of Consult: Bedside chair;  All needs within reach; Bed/Chair alarm activated      [employer]

## 2022-12-20 ENCOUNTER — APPOINTMENT (INPATIENT)
Dept: NON INVASIVE DIAGNOSTICS | Facility: HOSPITAL | Age: 77
End: 2022-12-20

## 2022-12-20 ENCOUNTER — TELEPHONE (OUTPATIENT)
Dept: HEMATOLOGY ONCOLOGY | Facility: CLINIC | Age: 77
End: 2022-12-20

## 2022-12-20 LAB
ANION GAP SERPL CALCULATED.3IONS-SCNC: 5 MMOL/L (ref 4–13)
BASOPHILS # BLD AUTO: 0.04 THOUSANDS/ÂΜL (ref 0–0.1)
BASOPHILS NFR BLD AUTO: 0 % (ref 0–1)
BUN SERPL-MCNC: 47 MG/DL (ref 5–25)
CALCIUM SERPL-MCNC: 9.5 MG/DL (ref 8.3–10.1)
CHLORIDE SERPL-SCNC: 105 MMOL/L (ref 96–108)
CO2 SERPL-SCNC: 25 MMOL/L (ref 21–32)
CREAT SERPL-MCNC: 2.94 MG/DL (ref 0.6–1.3)
EOSINOPHIL # BLD AUTO: 0.28 THOUSAND/ÂΜL (ref 0–0.61)
EOSINOPHIL NFR BLD AUTO: 3 % (ref 0–6)
ERYTHROCYTE [DISTWIDTH] IN BLOOD BY AUTOMATED COUNT: 17.2 % (ref 11.6–15.1)
GFR SERPL CREATININE-BSD FRML MDRD: 19 ML/MIN/1.73SQ M
GLUCOSE SERPL-MCNC: 112 MG/DL (ref 65–140)
GLUCOSE SERPL-MCNC: 135 MG/DL (ref 65–140)
GLUCOSE SERPL-MCNC: 145 MG/DL (ref 65–140)
GLUCOSE SERPL-MCNC: 146 MG/DL (ref 65–140)
GLUCOSE SERPL-MCNC: 164 MG/DL (ref 65–140)
GLUCOSE SERPL-MCNC: 191 MG/DL (ref 65–140)
HCT VFR BLD AUTO: 27.7 % (ref 36.5–49.3)
HGB BLD-MCNC: 8.7 G/DL (ref 12–17)
IMM GRANULOCYTES # BLD AUTO: 0.11 THOUSAND/UL (ref 0–0.2)
IMM GRANULOCYTES NFR BLD AUTO: 1 % (ref 0–2)
LYMPHOCYTES # BLD AUTO: 1.81 THOUSANDS/ÂΜL (ref 0.6–4.47)
LYMPHOCYTES NFR BLD AUTO: 17 % (ref 14–44)
MCH RBC QN AUTO: 30.3 PG (ref 26.8–34.3)
MCHC RBC AUTO-ENTMCNC: 31.4 G/DL (ref 31.4–37.4)
MCV RBC AUTO: 97 FL (ref 82–98)
MONOCYTES # BLD AUTO: 0.66 THOUSAND/ÂΜL (ref 0.17–1.22)
MONOCYTES NFR BLD AUTO: 6 % (ref 4–12)
NEUTROPHILS # BLD AUTO: 7.71 THOUSANDS/ÂΜL (ref 1.85–7.62)
NEUTS SEG NFR BLD AUTO: 73 % (ref 43–75)
NRBC BLD AUTO-RTO: 0 /100 WBCS
PLATELET # BLD AUTO: 273 THOUSANDS/UL (ref 149–390)
PMV BLD AUTO: 9.8 FL (ref 8.9–12.7)
POTASSIUM SERPL-SCNC: 4.6 MMOL/L (ref 3.5–5.3)
RBC # BLD AUTO: 2.87 MILLION/UL (ref 3.88–5.62)
SODIUM SERPL-SCNC: 135 MMOL/L (ref 135–147)
WBC # BLD AUTO: 10.61 THOUSAND/UL (ref 4.31–10.16)

## 2022-12-20 RX ORDER — FUROSEMIDE 20 MG/1
20 TABLET ORAL DAILY
Status: DISCONTINUED | OUTPATIENT
Start: 2022-12-20 | End: 2022-12-22 | Stop reason: HOSPADM

## 2022-12-20 RX ADMIN — OXYBUTYNIN 10 MG: 10 TABLET, FILM COATED, EXTENDED RELEASE ORAL at 09:36

## 2022-12-20 RX ADMIN — PANTOPRAZOLE SODIUM 40 MG: 40 TABLET, DELAYED RELEASE ORAL at 05:19

## 2022-12-20 RX ADMIN — EZETIMIBE 10 MG: 10 TABLET ORAL at 09:36

## 2022-12-20 RX ADMIN — OXYCODONE HYDROCHLORIDE 5 MG: 5 TABLET ORAL at 16:19

## 2022-12-20 RX ADMIN — INSULIN LISPRO 5 UNITS: 100 INJECTION, SOLUTION INTRAVENOUS; SUBCUTANEOUS at 09:39

## 2022-12-20 RX ADMIN — CEFTRIAXONE SODIUM 1000 MG: 10 INJECTION, POWDER, FOR SOLUTION INTRAVENOUS at 16:19

## 2022-12-20 RX ADMIN — HYDROMORPHONE HYDROCHLORIDE 0.2 MG: 0.2 INJECTION, SOLUTION INTRAMUSCULAR; INTRAVENOUS; SUBCUTANEOUS at 12:09

## 2022-12-20 RX ADMIN — INSULIN LISPRO 5 UNITS: 100 INJECTION, SOLUTION INTRAVENOUS; SUBCUTANEOUS at 17:26

## 2022-12-20 RX ADMIN — CALCITRIOL CAPSULES 0.25 MCG 0.25 MCG: 0.25 CAPSULE ORAL at 09:35

## 2022-12-20 RX ADMIN — OXYCODONE HYDROCHLORIDE 5 MG: 5 TABLET ORAL at 09:36

## 2022-12-20 RX ADMIN — HYDROMORPHONE HYDROCHLORIDE 0.2 MG: 0.2 INJECTION, SOLUTION INTRAMUSCULAR; INTRAVENOUS; SUBCUTANEOUS at 17:26

## 2022-12-20 RX ADMIN — DULOXETINE 30 MG: 30 CAPSULE, DELAYED RELEASE ORAL at 09:35

## 2022-12-20 RX ADMIN — FERROUS SULFATE TAB 325 MG (65 MG ELEMENTAL FE) 325 MG: 325 (65 FE) TAB at 09:35

## 2022-12-20 RX ADMIN — INSULIN GLARGINE 18 UNITS: 100 INJECTION, SOLUTION SUBCUTANEOUS at 22:17

## 2022-12-20 RX ADMIN — INSULIN LISPRO 1 UNITS: 100 INJECTION, SOLUTION INTRAVENOUS; SUBCUTANEOUS at 12:09

## 2022-12-20 RX ADMIN — METOPROLOL SUCCINATE 100 MG: 100 TABLET, EXTENDED RELEASE ORAL at 09:36

## 2022-12-20 RX ADMIN — INSULIN LISPRO 1 UNITS: 100 INJECTION, SOLUTION INTRAVENOUS; SUBCUTANEOUS at 22:17

## 2022-12-20 RX ADMIN — INSULIN LISPRO 5 UNITS: 100 INJECTION, SOLUTION INTRAVENOUS; SUBCUTANEOUS at 12:01

## 2022-12-20 RX ADMIN — TAMSULOSIN HYDROCHLORIDE 0.4 MG: 0.4 CAPSULE ORAL at 16:19

## 2022-12-20 RX ADMIN — ARIPIPRAZOLE 2 MG: 2 TABLET ORAL at 09:39

## 2022-12-20 RX ADMIN — GABAPENTIN 300 MG: 300 CAPSULE ORAL at 22:16

## 2022-12-20 RX ADMIN — FUROSEMIDE 20 MG: 20 TABLET ORAL at 13:56

## 2022-12-20 RX ADMIN — ISOSORBIDE MONONITRATE 30 MG: 30 TABLET, EXTENDED RELEASE ORAL at 09:35

## 2022-12-20 RX ADMIN — BIMATOPROST 1 DROP: 0.1 SOLUTION/ DROPS OPHTHALMIC at 22:16

## 2022-12-20 NOTE — PROGRESS NOTES
NEPHROLOGY PROGRESS NOTE   Meghan Herrera 76 y o  male MRN: [id number]  Unit/Bed#: [facility] 917-01 Encounter: [id number]    ASSESSMENT & PLAN:  64-year-old with history of bladder cancer status post bilateral nephrostomy tube was admitted for gross hematuria and bloody drainage from nephrostomy tube  CT was suggestive of right-sided hydronephrosis  Urology was consulted and underwent right ureteral stent placement  Nephrology consulted for CKD management    Chronic kidney disease stage IV  -Baseline creatinine 3 0-3 6  -Follows with [facility] kidney Dr Siri Herrera  -Admission creatinine was 3 57 on 12/12  -Renal function has improved to creatinine 2 7 but trended up to 2 94 mg/dL today  Renal function still within baseline, will continue to monitor  -Restarted on Lasix  -Recommend restarting Veltassa at home dose at the time of discharge and check BMP in 1 week after discharge, would recommend follow-up with his outpatient nephrologist at [facility]    History of obstructive uropathy status post chronic bilateral PCN and now found to have right-sided hydronephrosis on CT on admission on 12/12, status post cystoscopy and right ureteral stent placement on 12/14/22  Continue management as per urology   -As per last note from 12/19, urology signed off and plan for outpatient follow-up and recommended removal of Shirley catheter prior to discharge    Primary hypertension  -Blood pressure stable  -Currently on metoprolol 100 mg and isosorbide 30 mg daily  -Continue to hold Lasix 20 mg every other day  May restart on discharge        Gross hematuria/history of bladder cancer  -Status post cystoscopy and fulguration of bleeding points plus evacuation of clots on 12/4  -Still with pinkish urine but improved status post CBI  -Noted plan for outpatient follow-up and cystoscopy, fulguration and TURBT as outpatient in 3 months as well as plan for right ureteral stent exchange at the same time    History of hyperkalemia  -As outpatient on Veltassa every other day, recommend restarting at the time of discharge, continue low potassium diet during inpatient stay, potassium level currently normal range    Secondary hyperparathyroidism of renal origin continue calcitriol 0 25 mcg daily, monitor PTH level as outpatient    Anemia, iron deficiency  -Not on Epogen due to active malignancy  -Hemoglobin currently at 8 7 g/dL  Continue to monitor per primary team and recommend PRBC if it drops below 7  Iron saturation 19% with folate level more than 20  -On oral ferrous sulfate      Discussed with primary team    Nephrology Disposition Recommendations  Medically Stable for discharge from a Nephrology Standpoint Patient should have repeat bmp in 1 week with office follow up in  two weeks with his nephrologist at [id number] kidney        SUBJECTIVE:  No SOB or chest pain  Still with pinkish urine in wagner cath     OBJECTIVE:  Current Weight: Weight - Scale: 95 1 kg (209 lb 10 5 oz)  Vitals:    12/20/22 0939   BP:    Pulse:    Resp:    Temp:    SpO2: 98%       Intake/Output Summary (Last 24 hours) at 12/20/2022 1219  Last data filed at 12/20/2022 0945  Gross per 24 hour   Intake 270 ml   Output 1425 ml   Net -1155 ml       Physical Exam   General:  Ill looking, awake  Eyes: Conjunctivae pink,  Sclera anicteric  ENT: lips and mucous membranes moist  Neck: supple   Chest: Clear to Auscultation both lungs,  no crackles, ronchus or wheezing  CVS: S1 & S2 present, normal rate, regular rhythm, no murmur  Abdomen: soft, non-tender, non-distended, Bowel sounds normoactive  Extremities: Trace edema of  legs  Skin: no rash  Neuro: awake, alert, oriented  Psych: Mood and affect appropriate   - on wagner and  having pinkish urine       Medications:    Current Facility-Administered Medications:   •  acetaminophen (TYLENOL) tablet 650 mg, 650 mg, Oral, Q6H PRN, Bob Pritchard MD, 650 mg at 12/18/22 0844  •  ARIPiprazole (ABILIFY) tablet 2 mg, 2 mg, Oral, Daily, Karen Browning MD, 2 mg at 12/20/22 7344  •  azelastine (ASTELIN) 0 1 % nasal spray 1 spray, 1 spray, Each Nare, BID, Karen Browning MD, 1 spray at 12/19/22 0805  •  bimatoprost (LUMIGAN) 0 01 % ophthalmic solution 1 drop, 1 drop, Both Eyes, HS, Karen Browning MD, 1 drop at 12/19/22 2349  •  calcitriol (ROCALTROL) capsule 0 25 mcg, 0 25 mcg, Oral, Daily, Karen Browning MD, 0 25 mcg at 12/20/22 0935  •  cefTRIAXone (ROCEPHIN) 1,000 mg in dextrose 5 % 50 mL IVPB, 1,000 mg, Intravenous, Q24H, Tonya Cody DO, Stopped at 12/19/22 1743  •  DULoxetine (CYMBALTA) delayed release capsule 30 mg, 30 mg, Oral, Daily, Karen Browning MD, 30 mg at 12/20/22 0935  •  ezetimibe (ZETIA) tablet 10 mg, 10 mg, Oral, Daily, Karen Browning MD, 10 mg at 12/20/22 0045  •  ferrous sulfate tablet 325 mg, 325 mg, Oral, Daily With Breakfast, Karen Browning MD, 325 mg at 12/20/22 0935  •  fluticasone (FLONASE) 50 mcg/act nasal spray 1 spray, 1 spray, Nasal, Daily, Karen Browning MD, 1 spray at 12/19/22 0802  •  gabapentin (NEURONTIN) capsule 300 mg, 300 mg, Oral, HS, Karen Browning MD, 300 mg at 12/19/22 2348  •  HYDROmorphone HCl (DILAUDID) injection 0 2 mg, 0 2 mg, Intravenous, Q4H PRN, Tonya Cody DO, 0 2 mg at 12/20/22 1209  •  insulin glargine (LANTUS) subcutaneous injection 18 Units 0 18 mL, 18 Units, Subcutaneous, HS, Karen Browning MD, 18 Units at 12/19/22 2348  •  insulin lispro (HumaLOG) 100 units/mL subcutaneous injection 1-5 Units, 1-5 Units, Subcutaneous, HS, Karen Browning MD, 1 Units at 12/19/22 2348  •  insulin lispro (HumaLOG) 100 units/mL subcutaneous injection 1-6 Units, 1-6 Units, Subcutaneous, TID AC, 1 Units at 12/20/22 1209 **AND** Fingerstick Glucose (POCT), , , TID AC, Karen Browning MD  •  insulin lispro (HumaLOG) 100 units/mL subcutaneous injection 5 Units, 5 Units, Subcutaneous, TID With Meals, Karen Browning MD, 5 Units at 12/20/22 1201  • isosorbide mononitrate (IMDUR) 24 hr tablet 30 mg, 30 mg, Oral, QAM, Lexi Magana MD, 30 mg at 12/20/22 0935  •  metoprolol succinate (TOPROL-XL) 24 hr tablet 100 mg, 100 mg, Oral, Daily, Lexi Magana MD, 100 mg at 12/20/22 0936  •  ondansetron [facility]) injection 4 mg, 4 mg, Intravenous, Q6H PRN, Lexi Magana MD  •  oxybutynin (DITROPAN-XL) 24 hr tablet 10 mg, 10 mg, Oral, Daily, Lexi Magana MD, 10 mg at 12/20/22 5300  •  oxyCODONE (ROXICODONE) IR tablet 2 5 mg, 2 5 mg, Oral, Q6H PRN, Hetul Cody, DO  •  oxyCODONE (ROXICODONE) IR tablet 5 mg, 5 mg, Oral, Q6H PRN, Hetul Cody, DO, 5 mg at 12/20/22 6151  •  pantoprazole (PROTONIX) EC tablet 40 mg, 40 mg, Oral, Early Morning, Lexi Magana MD, 40 mg at 12/20/22 5794  •  polyethylene glycol (MIRALAX) packet 17 g, 17 g, Oral, Daily, Lexi Magana MD, 17 g at 12/18/22 0831  •  senna (SENOKOT) tablet 17 2 mg, 2 tablet, Oral, BID, Lexi Magana MD, 17 2 mg at 12/18/22 4462  •  tamsulosin (FLOMAX) capsule 0 4 mg, 0 4 mg, Oral, Daily With Florentin Moss MD, 0 4 mg at 12/19/22 1745  •  zolpidem (AMBIEN) tablet 5 mg, 5 mg, Oral, HS PRN, Lexi Magana MD, 5 mg at 12/16/22 2135    Invasive Devices:        Lab Results:   Results from last 7 days   Lab Units 12/20/22  0557 12/19/22  0550 12/18/22  0556 12/16/22  0548 12/15/22  0539   WBC Thousand/uL 10 61*  --  10 21*  --  11 67*   HEMOGLOBIN g/dL 8 7*  --  7 7*  --  7 7*   HEMATOCRIT % 27 7*  --  23 9*  --  24 0*   PLATELETS Thousands/uL 273  --  254  --  237   POTASSIUM mmol/L 4 6 4 5 4 0   < > 3 9   CHLORIDE mmol/L 105 108 107   < > 106   CO2 mmol/L 25 25 24   < > 24   BUN mg/dL 47* 42* 44*   < > 49*   CREATININE mg/dL 2 94* 2 73* 2 79*   < > 3 48*   CALCIUM mg/dL 9 5 9 0 8 8   < > 9 0   PHOSPHORUS mg/dL  --  3 4  --   --   --     < > = values in this interval not displayed         Previous work up:     Reviewed CT abdomen pelvis report from 12/12 2 showed stable bladder wall thickening and compatible with known neoplasm     -new right-sided hydronephrosis and stable retroperitoneal lymphadenopathy as well as left pleural effusion and epicardial metastasis    Portions of the record may have been created with voice recognition software  Occasional wrong word or "sound a like" substitutions may have occurred due to the inherent limitations of voice recognition software  Read the chart carefully and recognize, using context, where substitutions have occurred  If you have any questions, please contact the dictating provider

## 2022-12-20 NOTE — RESTORATIVE TECHNICIAN NOTE
Restorative Technician Note      Patient Name: Elle Caballero     Restorative Tech Visit Date: 12/20/22  Note Type: Mobility  Patient Position Upon Consult: Bedside chair  Activity Performed: Ambulated  Assistive Device: Roller walker  Patient Position at End of Consult: Supine;  All needs within [facility]

## 2022-12-20 NOTE — TELEPHONE ENCOUNTER
Please cancel all infusion treatment appointments up to 12/29/22 per Dr Efrem Manuel  Patient is admitted at this moment

## 2022-12-20 NOTE — RESTORATIVE TECHNICIAN NOTE
Restorative Technician Note      Patient Name: Claudia [facility] Tech Visit Date: 12/20/22  Note Type: Mobility  Patient Position Upon Consult: Supine  Activity Performed: Ambulated  Assistive Device: Roller walker  Patient Position at End of Consult: Bedside chair;  All needs within reach; Bed/Chair alarm activated      Nataly Mealing

## 2022-12-20 NOTE — TELEPHONE ENCOUNTER
Please cancel all infusion treatment appointments up to 12/29/22 per Dr Celina Woods  Patient is admitted at this moment

## 2022-12-20 NOTE — PROGRESS NOTES
[facility]  Progress Note - Rubén Leak 1945, 76 y o  male MRN: [id number]  Unit/Bed#: [facility] 917-01 Encounter: [id number]  Primary Care Provider: Ghada Rowley MD   Date and time admitted to hospital: 12/12/2022  3:38 PM      * Hematuria  Assessment & Plan  • Acute on chronic issue  • CT on admission revealed stable bladder wall thickening toward the left compatible with known neoplasm, new right hydronephrosis with high density intraluminal material in the distal right ureter likely representing blood, stable metastatic retroperitoneal lymphadenopathy, and 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  • Urology input appreciated - s/p continuous bladder irrigation  • Patient with bilateral nephrostomy tubes and had a Shirley placed in ED -> will remove Shirley with attempt at voiding trial today per urology recommendation     Suspected UTI (urinary tract infection)  Assessment & Plan  • PMH of 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 ? -> antibiotic regimen de-escalated to IV Rocephin - will complete course over the next few days    Right wrist drop  · XR imaging unremarkable  · Ongoing PT/OT evaluations     SOB (shortness of breath)  Assessment & Plan  · Known history of Pleur-X - small left pleural effusion similar to last month on CXR     CKD (chronic kidney disease) stage 4  Assessment & Plan  • Baseline creatinine fluctuating but approximately around 3 1-3 6 - mildly improved at 2 9 currently  • Monitor renal function and urine output - limit/avoid nephrotoxins as possible - avoid hypotension as possible  • Nephrology following -> recommending resuming Veltassa at time of discharge and BMP check 1 week post-discharge     Depression  Assessment & Plan  • Continue Cymbalta/Abilify     PAD (peripheral artery disease)  Assessment & Plan  • Continue Zetia - holding ASA secondary to hematuria     Insulin-dependent diabetes mellitus  Assessment & Plan        Lab Results   Component Value Date     HGBA1C 7 2 (H) 10/20/2022      • Continue basal/prandial insulin w/ additional SSI coverage per Accu-Cheks     Obstructive uropathy  Assessment & Plan  • In the setting of known bladder cancer status post BCG and subsequent chemotherapy/radiation s/p bilateral chronic PCN tubes  • Noted to have new right-sided hydronephrosis on CT imaging this admission status post otoscopy w/ right ureteral stenting on 12/14  • Continue Flomax/Ditropan  • Voiding trial attempt today per urology recommendation  • Outpatient follow-up w/ urology     Coronary artery disease   Assessment & Plan  • Holding ASA due to hematuria  • Continue Imdur/Toprol-XL  • As post prior coronary stenting     Essential hypertension  Assessment & Plan  • Continue Imdur/Toprol-XL      DVT Prophylaxis: SCDs       Patient Centered Rounds:  I have performed bedside rounds and discussed plan of care with nursing today  Discussions with Specialists or Other Care Team Provider:  see above assessments if applicable    Education and Discussions with Family / Patient: Patient admitted - granddaughter updated in extensive detail yesterday by provider     Time Spent for Care:  32 minutes  More than 50% of total time spent on counseling and coordination of care as described above  Current Length of Stay: 8 day(s)  Current Patient Status: Inpatient   Certification Statement:  Patient will continue to require additional hospital stay due to assessments as noted above  Code Status: Level 1 - Full Code        Subjective:     Encountered earlier in the day while working with physical therapy    Denies worsening pain at this time   Acknowledges weakness/fatigue  Objective:     Vitals:   Temp (24hrs), Av 4 °F (36 3 °C), Min:96 8 °F (36 °C), Max:97 9 °F (36 6 °C)    Temp:  [96 8 °F (36 °C)-97 9 °F (36 6 °C)] 97 7 °F (36 5 °C)  HR:  [85-95] 91  Resp:  [18-20] 20  BP: (130-143)/(77-91) 133/79  SpO2:  [94 %-99 %] 94 %  Body mass index is 26 21 kg/m²  Input and Output Summary (last 24 hours):        Intake/Output Summary (Last 24 hours) at 2022 1753  Last data filed at 2022 1557  Gross per 24 hour   Intake 480 ml   Output 1825 ml   Net -1345 ml       Physical Exam:     GENERAL:   Weak/fatigued  HEAD:   Normocephalic - atraumatic  MOUTH:   Mucosa moist  NECK:   Supple - full range of motion  CARDIAC:   Rate controlled - S1/S2 positive  PULMONARY:   Clear breath sounds bilaterally - nonlabored respirations  ABDOMEN:   Soft - nontender/nondistended - active bowel sounds - bilateral PCNs in place  MUSCULOSKELETAL:   Motor strength/range of motion deconditioned  NEUROLOGIC:   At baseline currently  SKIN:   Chronic wrinkles/blemishes   PSYCHIATRIC:   Mood/affect stable      Additional Data:     Labs & Recent Cultures:    Results from last 7 days   Lab Units 22  0557   WBC Thousand/uL 10 61*   HEMOGLOBIN g/dL 8 7*   HEMATOCRIT % 27 7*   PLATELETS Thousands/uL 273   NEUTROS PCT % 73   LYMPHS PCT % 17   MONOS PCT % 6   EOS PCT % 3     Results from last 7 days   Lab Units 22  0557   POTASSIUM mmol/L 4 6   CHLORIDE mmol/L 105   CO2 mmol/L 25   BUN mg/dL 47*   CREATININE mg/dL 2 94*   CALCIUM mg/dL 9 5         Results from last 7 days   Lab Units 22  1723 22  1203 22  0804 22  0741 22  2343 22  2043 22  1637 22  1107 22  0802 22  2101 22  1600 22  1104   POC GLUCOSE mg/dl 112 164* 135 145* 190* 231* 173* 182* 97 134 151* 205*                         Lines/Drains:  Invasive Devices     Central Venous Catheter Line  Duration           Port A Cath Right Chest -- days          Peripheral Intravenous Line  Duration           Peripheral IV 12/19/22 Left;Ventral (anterior) Forearm 1 day          Drain  Duration           Nephrostomy Left 10 2 Fr  35 days    Nephrostomy Right 10 2 Fr  35 days                  Last 24 Hours Medication List:   Current Facility-Administered Medications   Medication Dose Route Frequency Provider Last Rate   • acetaminophen  650 mg Oral Q6H PRN Krista Jefferson MD     • ARIPiprazole  2 mg Oral Daily Krista Jefferson MD     • azelastine  1 spray Each Nare BID Krista Jefferson MD     • bimatoprost  1 drop Both Eyes HS Krista Jeffersno MD     • calcitriol  0 25 mcg Oral Daily Krista Jefferson MD     • cefTRIAXone  1,000 mg Intravenous Q24H Hetul Cody, DO 1,000 mg (12/20/22 1619)   • DULoxetine  30 mg Oral Daily Krista Jefferson MD     • ezetimibe  10 mg Oral Daily Krista Jefferson MD     • ferrous sulfate  325 mg Oral Daily With Breakfast Krista Jefferson MD     • fluticasone  1 spray Nasal Daily Krista Jefferson MD     • furosemide  20 mg Oral Daily Azra Pearson MD     • gabapentin  300 mg Oral HS Krista Jefferson MD     • HYDROmorphone  0 2 mg Intravenous Q4H PRN Hetul Cody, DO     • insulin glargine  18 Units Subcutaneous HS Krista Jefferson MD     • insulin lispro  1-5 Units Subcutaneous HS Krista Jefferson MD     • insulin lispro  1-6 Units Subcutaneous TID AC Krista Jefferson MD     • insulin lispro  5 Units Subcutaneous TID With Meals Krista Jefferson MD     • isosorbide mononitrate  30 mg Oral QAM Krista Jefferson MD     • metoprolol succinate  100 mg Oral Daily Krista Jefferson MD     • ondansetron  4 mg Intravenous Q6H PRN Krista Jefferson MD     • oxybutynin  10 mg Oral Daily Krista Jefferson MD     • oxyCODONE  2 5 mg Oral Q6H PRN Hetul Cody, DO     • oxyCODONE  5 mg Oral Q6H PRN Hetul Cody, DO     • pantoprazole  40 mg Oral Early Morning Edvintta Rebecca Elida Soliman MD     • polyethylene glycol  17 g Oral Daily Vida Reich MD     • senna  2 tablet Oral BID Vida Reich MD     • tamsulosin  0 4 mg Oral Daily With Mauricio Almonte MD     • zolpidem  5 mg Oral HS PRN Vida Reich MD                    ** Please Note: This note is constructed using a voice recognition dictation system  An occasional wrong word/phrase or “sound-a-like” substitution may have been picked up by dictation device due to the inherent limitations of voice recognition software  Read the chart carefully and recognize, using reasonable context, where substitutions may have occurred  **

## 2022-12-21 LAB
ANION GAP SERPL CALCULATED.3IONS-SCNC: 7 MMOL/L (ref 4–13)
BASOPHILS # BLD AUTO: 0.04 THOUSANDS/ÂΜL (ref 0–0.1)
BASOPHILS NFR BLD AUTO: 0 % (ref 0–1)
BUN SERPL-MCNC: 45 MG/DL (ref 5–25)
CALCIUM SERPL-MCNC: 9.3 MG/DL (ref 8.3–10.1)
CHLORIDE SERPL-SCNC: 104 MMOL/L (ref 96–108)
CO2 SERPL-SCNC: 26 MMOL/L (ref 21–32)
CREAT SERPL-MCNC: 2.92 MG/DL (ref 0.6–1.3)
EOSINOPHIL # BLD AUTO: 0.27 THOUSAND/ÂΜL (ref 0–0.61)
EOSINOPHIL NFR BLD AUTO: 3 % (ref 0–6)
ERYTHROCYTE [DISTWIDTH] IN BLOOD BY AUTOMATED COUNT: 16.9 % (ref 11.6–15.1)
GFR SERPL CREATININE-BSD FRML MDRD: 19 ML/MIN/1.73SQ M
GLUCOSE SERPL-MCNC: 101 MG/DL (ref 65–140)
GLUCOSE SERPL-MCNC: 110 MG/DL (ref 65–140)
GLUCOSE SERPL-MCNC: 119 MG/DL (ref 65–140)
GLUCOSE SERPL-MCNC: 165 MG/DL (ref 65–140)
GLUCOSE SERPL-MCNC: 88 MG/DL (ref 65–140)
HCT VFR BLD AUTO: 23.7 % (ref 36.5–49.3)
HGB BLD-MCNC: 7.5 G/DL (ref 12–17)
IMM GRANULOCYTES # BLD AUTO: 0.09 THOUSAND/UL (ref 0–0.2)
IMM GRANULOCYTES NFR BLD AUTO: 1 % (ref 0–2)
LYMPHOCYTES # BLD AUTO: 1.56 THOUSANDS/ÂΜL (ref 0.6–4.47)
LYMPHOCYTES NFR BLD AUTO: 16 % (ref 14–44)
MCH RBC QN AUTO: 30.7 PG (ref 26.8–34.3)
MCHC RBC AUTO-ENTMCNC: 31.6 G/DL (ref 31.4–37.4)
MCV RBC AUTO: 97 FL (ref 82–98)
MONOCYTES # BLD AUTO: 0.65 THOUSAND/ÂΜL (ref 0.17–1.22)
MONOCYTES NFR BLD AUTO: 6 % (ref 4–12)
NEUTROPHILS # BLD AUTO: 7.47 THOUSANDS/ÂΜL (ref 1.85–7.62)
NEUTS SEG NFR BLD AUTO: 74 % (ref 43–75)
NRBC BLD AUTO-RTO: 0 /100 WBCS
PLATELET # BLD AUTO: 241 THOUSANDS/UL (ref 149–390)
PMV BLD AUTO: 10.2 FL (ref 8.9–12.7)
POTASSIUM SERPL-SCNC: 4.3 MMOL/L (ref 3.5–5.3)
RBC # BLD AUTO: 2.44 MILLION/UL (ref 3.88–5.62)
SODIUM SERPL-SCNC: 137 MMOL/L (ref 135–147)
WBC # BLD AUTO: 10.08 THOUSAND/UL (ref 4.31–10.16)

## 2022-12-21 RX ADMIN — OXYBUTYNIN 10 MG: 10 TABLET, FILM COATED, EXTENDED RELEASE ORAL at 08:22

## 2022-12-21 RX ADMIN — PANTOPRAZOLE SODIUM 40 MG: 40 TABLET, DELAYED RELEASE ORAL at 05:38

## 2022-12-21 RX ADMIN — INSULIN LISPRO 5 UNITS: 100 INJECTION, SOLUTION INTRAVENOUS; SUBCUTANEOUS at 16:54

## 2022-12-21 RX ADMIN — METOPROLOL SUCCINATE 100 MG: 100 TABLET, EXTENDED RELEASE ORAL at 08:22

## 2022-12-21 RX ADMIN — INSULIN LISPRO 1 UNITS: 100 INJECTION, SOLUTION INTRAVENOUS; SUBCUTANEOUS at 16:54

## 2022-12-21 RX ADMIN — TAMSULOSIN HYDROCHLORIDE 0.4 MG: 0.4 CAPSULE ORAL at 16:55

## 2022-12-21 RX ADMIN — ARIPIPRAZOLE 2 MG: 2 TABLET ORAL at 08:22

## 2022-12-21 RX ADMIN — FUROSEMIDE 20 MG: 20 TABLET ORAL at 08:21

## 2022-12-21 RX ADMIN — BIMATOPROST 1 DROP: 0.1 SOLUTION/ DROPS OPHTHALMIC at 21:54

## 2022-12-21 RX ADMIN — CEFTRIAXONE SODIUM 1000 MG: 10 INJECTION, POWDER, FOR SOLUTION INTRAVENOUS at 16:54

## 2022-12-21 RX ADMIN — FERROUS SULFATE TAB 325 MG (65 MG ELEMENTAL FE) 325 MG: 325 (65 FE) TAB at 08:22

## 2022-12-21 RX ADMIN — FLUTICASONE PROPIONATE 1 SPRAY: 50 SPRAY, METERED NASAL at 08:22

## 2022-12-21 RX ADMIN — ISOSORBIDE MONONITRATE 30 MG: 30 TABLET, EXTENDED RELEASE ORAL at 08:21

## 2022-12-21 RX ADMIN — INSULIN GLARGINE 18 UNITS: 100 INJECTION, SOLUTION SUBCUTANEOUS at 21:54

## 2022-12-21 RX ADMIN — EZETIMIBE 10 MG: 10 TABLET ORAL at 08:21

## 2022-12-21 RX ADMIN — INSULIN LISPRO 5 UNITS: 100 INJECTION, SOLUTION INTRAVENOUS; SUBCUTANEOUS at 12:55

## 2022-12-21 RX ADMIN — GABAPENTIN 300 MG: 300 CAPSULE ORAL at 21:54

## 2022-12-21 RX ADMIN — CALCITRIOL CAPSULES 0.25 MCG 0.25 MCG: 0.25 CAPSULE ORAL at 08:21

## 2022-12-21 RX ADMIN — DULOXETINE 30 MG: 30 CAPSULE, DELAYED RELEASE ORAL at 08:21

## 2022-12-21 RX ADMIN — ZOLPIDEM TARTRATE 5 MG: 5 TABLET, COATED ORAL at 00:58

## 2022-12-21 RX ADMIN — INSULIN LISPRO 5 UNITS: 100 INJECTION, SOLUTION INTRAVENOUS; SUBCUTANEOUS at 08:23

## 2022-12-21 NOTE — PLAN OF CARE
Problem: OCCUPATIONAL THERAPY ADULT  Goal: Performs self-care activities at highest level of function for planned discharge setting  See evaluation for individualized goals  Description: Treatment Interventions: ADL retraining, Functional transfer training, UE strengthening/ROM, Endurance training, Cognitive reorientation, Patient/family training, Equipment evaluation/education, Neuromuscular reeducation, Fine motor coordination activities, Compensatory technique education, UE splinting, Continued evaluation, Energy conservation, Activityengagement          See flowsheet documentation for full assessment, interventions and recommendations  Outcome: Progressing  Note: Limitation: Decreased ADL status, Decreased UE ROM, Decreased UE strength, Decreased cognition, Decreased endurance, Decreased self-care trans, Decreased fine motor control, Decreased high-level ADLs, Decreased sensation  Prognosis: Fair  Assessment: Pt was seen on 12/21/2022 for skilled OT session  OT session was focused on ADLs, education, functional mobility, R UE strengthening/exercise  Pt was agreeable to OT session  Pt was seen with PT 2* medical complexity  Pt was able to demonstrate bed mobility and transfers w/ mod Ax1  Pt was able to demonstrate short distanced household mobility with mod Ax2 using upright rollator  Pt was able to demonstrate grooming with min A and vc for compensatory techniques  Pt participated in wrist extension exercises 3 sets of 10, finger extension 2 sets of 10, and grasp strengthening activity with therapy ball 20x  Pt is limited 2* wrist drop and decreased strength in wrist  Pt is limited 2* pain, endurance, activity tolerance, functional mobility, balance, functional standing tolerance, decreased I w/ ADLS/IADLS and coordination deficits  The following Occupational Performance Areas to address include: eating, grooming, bathing/shower, toilet hygiene, dressing, health maintenance and functional mobility   The patient's raw score on the AM-PAC Daily Activity inpatient short form is 14, standardized score is 33 39, less than 39 4  Patients at this level are likely to benefit from discharge to post-acute rehabilitation services  Please refer to the recommendation of the Occupational Therapist for safe discharge planning  Pt will continue to benefit from skilled OT services 3-5x a wk to address following goals 2-4x a week for 10-14 days  OT recommendations for d/c include post acute rehab       OT Discharge Recommendation: Post acute rehabilitation services

## 2022-12-21 NOTE — RESTORATIVE TECHNICIAN NOTE
Restorative Technician Note      Patient Name: Eloy Green     Restorative Tech Visit Date: 12/21/22  Note Type: Mobility  Patient Position Upon Consult: Supine  Activity Performed: Repositioned; Other (Comment) (Pt perfromed bed mobility for daily cleaning)  Brace Applied: Other (Comment) (Resting hand splint; RUE)  Patient Position When Brace Applied: Supine  Education Provided: Yes  Patient Position at End of Consult: Supine;  All needs within reach    [facility] 2, BA, RT Unclear source.  - Will trend

## 2022-12-21 NOTE — PHYSICAL THERAPY NOTE
PT Treatment       12/21/22 1110   PT Last Visit   PT Visit Date 12/21/22   Note Type   Note Type Treatment   Pain Assessment   Pain Assessment Tool FLACC   Pain Rating: FLACC (Rest) - Face 0   Pain Rating: FLACC (Rest) - Legs 0   Pain Rating: FLACC (Rest) - Activity 0   Pain Rating: FLACC (Rest) - Cry 0   Pain Rating: FLACC (Rest) - Consolability 0   Score: FLACC (Rest) 0   Pain Rating: FLACC (Activity) - Face 0   Pain Rating: FLACC (Activity) - Legs 0   Pain Rating: FLACC (Activity) - Activity 0   Pain Rating: FLACC (Activity) - Cry 0   Pain Rating: FLACC (Activity) - Consolability 0   Score: FLACC (Activity) 0   Restrictions/Precautions   Weight Bearing Precautions Per Order No   Braces or Orthoses (S)  Other (Comment)  (FUNCTIONAL RESTING HAND SPLINT ON R LE- PATIENT WITH WRIST DROP)   Other Precautions Fall Risk;Bed Alarm; Chair Alarm  (b/l nephrostomy tubes)   General   Chart Reviewed Yes   Response to Previous Treatment Patient with no complaints from previous session  Family/Caregiver Present No   Cognition   Arousal/Participation Alert; Cooperative   Attention Within functional limits   Memory Decreased recall of precautions   Following Commands Follows multistep commands with increased time or repetition   Comments pleasant, cooperative   Subjective   Subjective "we can try"- referring to walking   Bed Mobility   Supine to Sit 3  Moderate assistance   Additional items Assist x 1;HOB elevated; Increased time required   Sit to Supine Unable to assess   Additional Comments patient mod-AX1 for supine-->sit transfer toward R side of bed  He maintained static and dynamic sitting EOB x 8 minutes S level while performing R UE exercises with OT  PT addressing sitting tolerance and balance  Post treatment patient seated OOB in chair w/ alarm active  Transfers   Sit to Stand 3  Moderate assistance   Additional items Increased time required;Verbal cues; Assist x 1  (SBA 2nd person for safety)   Stand to Sit 3 Moderate assistance   Additional items Increased time required;Verbal cues; Assist x 1  (SBA 2nd person for safety)   Stand pivot 3  Moderate assistance   Additional items Increased time required;Verbal cues; Assist x 1  (SBA 2nd person for safety)   Additional Comments transferred bed to chair with his own upright rollator   Ambulation/Elevation   Gait pattern Excessively slow;Knees flexed; Short stride;Decreased foot clearance   Gait Assistance 3  Moderate assist   Additional items Assist x 1  (SBA 2nd person for safety and chair follow)   Assistive Device 4-wheeled walker  (patient's own upright rollator)   Distance 30 feet x 2   Ambulation/Elevation Additional Comments VC during gait training- maintain upright posture, increased b/l LE clearance and stride length  Balance   Static Sitting Fair   Dynamic Sitting Fair   Static Standing Fair -   Dynamic Standing Poor +   Ambulatory Poor +   Endurance Deficit   Endurance Deficit Yes   Endurance Deficit Description weakness, fatigue   Activity Tolerance   Activity Tolerance Patient limited by fatigue   Medical Staff Made Aware Co-treatment with occupational therapy as performed today  This patient's participation in therapy services was limited by decreased tolerance for activity and current medical status  For these reasons, co-treatment was performed so that patient could optimally participate in therapy services and maximize their rehabilitation during treatment time  PT and OT disciplines addressed separate goals of patient's individualized care plan  Exercises   Balance training  Sat EOB x 8 minutes while performing dynamic UE tasks with OT and PT addressed balance and tolerance   Assessment   Prognosis Fair   Problem List Decreased strength;Decreased endurance; Impaired balance;Decreased mobility   Assessment PT initiated treatment session in order to assist patient in achieving goals to improve transfers, gait training, and overall activity tolerance   Patient demonstrated progress toward achieving functional mobility goals as evidenced by increased ambulatory distance and requiring less physical assistance  Patient mod-AX1 for bed mobility, sit<-->stand transfers, and ambulation  He progressed ambulation, walking 60 feet with his upright rollator w/ VC for safety and 2nd person for chair follow due to reduced strength and activity tolerance  Throughout treatment session patient required both verbal and tactile cuing to improve safety, efficiency, and mechanics of mobility in addition to hands on assistance for all aspects of functional mobility  Additionally, he required increased time to execute specific mobility tasks with rest breaks in between secondary to gross fatigue and weakness  PT d/c recommendation remains for rehab  Patient will continue to benefit from continued skilled PT this admission to achieve maximal function and safety  Goals   Patient Goals for his right arm to work better   STG Expiration Date 12/30/22   Plan   Treatment/Interventions Functional transfer training;LE strengthening/ROM; Elevations; Therapeutic exercise; Endurance training;Patient/family training;Equipment eval/education; Bed mobility;Gait training;OT;Spoke to nursing   Progress Progressing toward goals   PT Frequency Other (Comment)  (2-4x/wk)   Recommendation   PT Discharge Recommendation Post acute rehabilitation services   Equipment Recommended Leehayley wAadkimberly  (patient has own upright rollator)   AM-PAC Basic Mobility Inpatient   Turning in Bed Without Bedrails 3   Lying on Back to Sitting on Edge of Flat Bed 2   Moving Bed to Chair 2   Standing Up From Chair 2   Walk in Room 2   Climb 3-5 Stairs 1   Basic Mobility Inpatient Raw Score 12   Basic Mobility Standardized Score 32 23   Highest Level Of Mobility   -HL Goal 4: Move to chair/commode   JH-HLM Achieved 7: Walk 25 feet or more       The patient's AM-PAC Basic Mobility Inpatient Standardized Score is less than 42 9, suggesting this patient may benefit from discharge to post-acute rehabilitation services  Please also refer to the recommendation of the Physical Therapist for safe discharge planning      ROBERTA MOREJON PT, DPT

## 2022-12-21 NOTE — PLAN OF CARE
Problem: Nutrition/Hydration-ADULT  Goal: Nutrient/Hydration intake appropriate for improving, restoring or maintaining nutritional needs  Description: Monitor and assess patient's nutrition/hydration status for malnutrition  Collaborate with interdisciplinary team and initiate plan and interventions as ordered  Monitor patient's weight and dietary intake as ordered or per policy  Utilize nutrition screening tool and intervene as necessary  Determine patient's food preferences and provide high-protein, high-caloric foods as appropriate       INTERVENTIONS:  - Monitor oral intake, urinary output, labs, and treatment plans  - Assess nutrition and hydration status and recommend course of action  - Evaluate amount of meals eaten  - Assist patient with eating if necessary   - Allow adequate time for meals  - Recommend/ encourage appropriate diets, oral nutritional supplements, and vitamin/mineral supplements  - Order, calculate, and assess calorie counts as needed  - Recommend, monitor, and adjust tube feedings and TPN/PPN based on assessed needs  - Assess need for intravenous fluids  - Provide specific nutrition/hydration education as appropriate  - Include patient/family/caregiver in decisions related to nutrition  Outcome: Progressing     Problem: MOBILITY - ADULT  Goal: Maintain or return to baseline ADL function  Description: INTERVENTIONS:  -  Assess patient's ability to carry out ADLs; assess patient's baseline for ADL function and identify physical deficits which impact ability to perform ADLs (bathing, care of mouth/teeth, toileting, grooming, dressing, etc )  - Assess/evaluate cause of self-care deficits   - Assess range of motion  - Assess patient's mobility; develop plan if impaired  - Assess patient's need for assistive devices and provide as appropriate  - Encourage maximum independence but intervene and supervise when necessary  - Involve family in performance of ADLs  - Assess for home care needs following discharge   - Consider OT consult to assist with ADL evaluation and planning for discharge  - Provide patient education as appropriate  Outcome: Progressing  Goal: Maintains/Returns to pre admission functional level  Description: INTERVENTIONS:  - Perform BMAT or MOVE assessment daily    - Set and communicate daily mobility goal to care team and patient/family/caregiver  - Collaborate with rehabilitation services on mobility goals if consulted  - Perform Range of Motion  times a day  - Reposition patient every  hours    - Dangle patient  times a day  - Stand patient  times a day  - Ambulate patient  times a day  - Out of bed to chair  times a day   - Out of bed for meals  times a day  - Out of bed for toileting  - Record patient progress and toleration of activity level   Outcome: Progressing     Problem: Potential for Falls  Goal: Patient will remain free of falls  Description: INTERVENTIONS:  - Educate patient/family on patient safety including physical limitations  - Instruct patient to call for assistance with activity   - Consult OT/PT to assist with strengthening/mobility   - Keep Call bell within reach  - Keep bed low and locked with side rails adjusted as appropriate  - Keep care items and personal belongings within reach  - Initiate and maintain comfort rounds  - Make Fall Risk Sign visible to staff  - Offer Toileting every  Hours, in advance of need  - Initiate/Maintain alarm  - Obtain necessary fall risk management equipment  - Apply yellow socks and bracelet for high fall risk patients  - Consider moving patient to room near nurses station  Outcome: Progressing     Problem: Prexisting or High Potential for Compromised Skin Integrity  Goal: Skin integrity is maintained or improved  Description: INTERVENTIONS:  - Identify patients at risk for skin breakdown  - Assess and monitor skin integrity  - Assess and monitor nutrition and hydration status  - Monitor labs   - Assess for incontinence   - Turn and reposition patient  - Assist with mobility/ambulation  - Relieve pressure over bony prominences  - Avoid friction and shearing  - Provide appropriate hygiene as needed including keeping skin clean and dry  - Evaluate need for skin moisturizer/barrier cream  - Collaborate with interdisciplinary team   - Patient/family teaching  - Consider wound care consult   Outcome: Progressing

## 2022-12-21 NOTE — PROGRESS NOTES
[facility]  Progress Note - Silver Keane 1945, 76 y o  male MRN: [id number]  Unit/Bed#: [facility] 917-01 Encounter: [id number]  Primary Care Provider: Mesfin Freedman MD   Date and time admitted to hospital: 12/12/2022  3:38 PM      * Hematuria  Assessment & Plan  • Acute on chronic issue  • CT on admission revealed stable bladder wall thickening toward the left compatible with known neoplasm, new right hydronephrosis with high density intraluminal material in the distal right ureter likely representing blood, stable metastatic retroperitoneal lymphadenopathy, and 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  • Urology input appreciated - s/p continuous bladder irrigation  • Patient with bilateral nephrostomy tubes and had a Shirley placed in ED -> passed voiding trial today -> anticipate discharge to skilled rehab tomorrow     Suspected UTI (urinary tract infection)  Assessment & Plan  • PMH of 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 ? -> antibiotic regimen de-escalated to IV Rocephin - will complete course over the next few days    Right wrist drop  · XR imaging unremarkable - RUE venous Doppler negative for DVT or superficial thrombophlebitis and reported normal response to augmentation maneuvers  · Ongoing PT/OT evaluation -> splint placed     SOB (shortness of breath)  Assessment & Plan  · Known history of Pleur-X - small left pleural effusion similar to last month on CXR     CKD (chronic kidney disease) stage 4  Assessment & Plan  • Baseline creatinine fluctuating but approximately around 3 1-3 6 - mildly improved at 2 92 currently  • Monitor renal function and urine output - limit/avoid nephrotoxins as possible - avoid hypotension as possible  • Nephrology following -> recommending resuming Veltassa at time of discharge and BMP check 1 week post-discharge     Depression  Assessment & Plan  • Continue Cymbalta/Abilify     PAD (peripheral artery disease)  Assessment & Plan  • Continue Zetia - holding ASA secondary to hematuria (resume when okay with urology)     Insulin-dependent diabetes mellitus  Assessment & Plan        Lab Results   Component Value Date     HGBA1C 7 2 (H) 10/20/2022      • Continue basal/prandial insulin w/ additional SSI coverage per Accu-Cheks     Obstructive uropathy  Assessment & Plan  • In the setting of known bladder cancer status post BCG and subsequent chemotherapy/radiation s/p bilateral chronic PCN tubes  • Noted to have new right-sided hydronephrosis on CT imaging this admission status post otoscopy w/ right ureteral stenting on 12/14  • Continue Flomax/Ditropan  • Shirley catheter removed  • Outpatient follow-up w/ urology     Coronary artery disease   Assessment & Plan  • Holding ASA due to hematuria  • Continue Imdur/Toprol-XL  • As post prior coronary stenting     Essential hypertension  Assessment & Plan  • Continue Imdur/Toprol-XL      DVT Prophylaxis: SCDs       Patient Centered Rounds:  I have performed bedside rounds and discussed plan of care with nursing today  Discussions with Specialists or Other Care Team Provider:  see above assessments if applicable    Education and Discussions with Family / Patient: Patient at bedside - wife aware of tentative discharge to skilled rehab pending placement    Time Spent for Care:  32 minutes  More than 50% of total time spent on counseling and coordination of care as described above  Current Length of Stay: 9 day(s)  Current Patient Status: Inpatient   Certification Statement:  Patient will continue to require additional hospital stay due to assessments as noted above      Code Status: Level 1 - Full Code        Subjective:     Seen and examined earlier in the day  Sitting upright in chair without acute complaints this time  Currently denying blood in urine  Remains weak/fatigued  Objective:     Vitals:   Temp (24hrs), Av 5 °F (36 4 °C), Min:97 2 °F (36 2 °C), Max:97 9 °F (36 6 °C)    Temp:  [97 2 °F (36 2 °C)-97 9 °F (36 6 °C)] 97 9 °F (36 6 °C)  HR:  [78-89] 78  Resp:  [18-19] 19  BP: (133-143)/(78-82) 138/79  SpO2:  [96 %-99 %] 99 %  Body mass index is 25 74 kg/m²  Input and Output Summary (last 24 hours):        Intake/Output Summary (Last 24 hours) at 2022 1557  Last data filed at 2022 1300  Gross per 24 hour   Intake 480 ml   Output 2610 ml   Net -2130 ml       Physical Exam:     GENERAL:   Weak/fatigued  HEAD:   Normocephalic - atraumatic  MOUTH:   Mucosa moist  NECK:   Supple - full range of motion  CARDIAC:   Rate controlled - S1/S2 positive  PULMONARY:   Clear breath sounds bilaterally - nonlabored respirations  ABDOMEN:   Soft - nontender/nondistended - active bowel sounds - bilateral PCNs in place  MUSCULOSKELETAL:   Motor strength/range of motion remains deconditioned  NEUROLOGIC:   At baseline currently  SKIN:   Chronic wrinkles/blemishes   PSYCHIATRIC:   Mood/affect stable      Additional Data:     Labs & Recent Cultures:    Results from last 7 days   Lab Units 22  0553   WBC Thousand/uL 10 08   HEMOGLOBIN g/dL 7 5*   HEMATOCRIT % 23 7*   PLATELETS Thousands/uL 241   NEUTROS PCT % 74   LYMPHS PCT % 16   MONOS PCT % 6   EOS PCT % 3     Results from last 7 days   Lab Units 22  0553   POTASSIUM mmol/L 4 3   CHLORIDE mmol/L 104   CO2 mmol/L 26   BUN mg/dL 45*   CREATININE mg/dL 2 92*   CALCIUM mg/dL 9 3         Results from last 7 days   Lab Units 22  1135 22  0748 22  2101 22  1723 22  1203 22  0804 22  0741 22  2343 22  2043 22  1637 22  1107 22  0802   POC GLUCOSE mg/dl 119 88 191* 112 164* 135 145* 190* 231* 173* 182* 97                         Lines/Drains:  Invasive Devices     Central Venous Catheter Line  Duration           Port A Cath Right Chest -- days          Peripheral Intravenous Line  Duration           Peripheral IV 12/19/22 Left;Ventral (anterior) Forearm 1 day          Drain  Duration           Nephrostomy Left 10 2 Fr  36 days    Nephrostomy Right 10 2 Fr  36 days                  Last 24 Hours Medication List:   Current Facility-Administered Medications   Medication Dose Route Frequency Provider Last Rate   • acetaminophen  650 mg Oral Q6H PRN Shanta Jacobo MD     • ARIPiprazole  2 mg Oral Daily Shanta Jacobo MD     • azelastine  1 spray Each Nare BID Shanta Jacobo MD     • bimatoprost  1 drop Both Eyes HS Shanta Jacobo MD     • calcitriol  0 25 mcg Oral Daily Shanta Jacobo MD     • cefTRIAXone  1,000 mg Intravenous Q24H Hetul Cody, DO 1,000 mg (12/20/22 1619)   • DULoxetine  30 mg Oral Daily Shanta Jacobo MD     • ezetimibe  10 mg Oral Daily Shanta Jacobo MD     • ferrous sulfate  325 mg Oral Daily With Breakfast Shanta Jacobo MD     • fluticasone  1 spray Nasal Daily Shanta Jacobo MD     • furosemide  20 mg Oral Daily Caroline Thompson MD     • gabapentin  300 mg Oral HS Shanta Jacobo MD     • HYDROmorphone  0 2 mg Intravenous Q4H PRN Hetul Cody, DO     • insulin glargine  18 Units Subcutaneous HS Shanta Jacobo MD     • insulin lispro  1-5 Units Subcutaneous HS Shanta Jacobo MD     • insulin lispro  1-6 Units Subcutaneous TID AC Shanta Jacobo MD     • insulin lispro  5 Units Subcutaneous TID With Meals Shanta Jacobo MD     • isosorbide mononitrate  30 mg Oral QAM Shanta Jacobo MD     • metoprolol succinate  100 mg Oral Daily Shanta Jacobo MD     • ondansetron  4 mg Intravenous Q6H PRN Shanta Jacobo MD     • oxybutynin  10 mg Oral Daily Marie Gonzales April Varner MD     • oxyCODONE  2 5 mg Oral Q6H PRN Hetul Cody, DO     • oxyCODONE  5 mg Oral Q6H PRN Hetul Cody, DO     • pantoprazole  40 mg Oral Early Morning Gabriele Cavanaugh MD     • polyethylene glycol  17 g Oral Daily Gabriele Cavanaugh MD     • senna  2 tablet Oral BID Gabriele Cavanaugh MD     • tamsulosin  0 4 mg Oral Daily With Gil Gerard MD     • zolpidem  5 mg Oral HS PRN Gabriele Cavanaugh MD                    ** Please Note: This note is constructed using a voice recognition dictation system  An occasional wrong word/phrase or “sound-a-like” substitution may have been picked up by dictation device due to the inherent limitations of voice recognition software  Read the chart carefully and recognize, using reasonable context, where substitutions may have occurred  **

## 2022-12-21 NOTE — PROGRESS NOTES
NEPHROLOGY PROGRESS NOTE   Brandee Desouza 76 y o  male MRN: [id number]  Unit/Bed#: [facility] 917-01 Encounter: [id number]    ASSESSMENT & PLAN:  78-year-old with history of bladder cancer status post bilateral nephrostomy tube was admitted for gross hematuria and bloody drainage from nephrostomy tube  CT was suggestive of right-sided hydronephrosis  Urology was consulted and underwent right ureteral stent placement  Nephrology consulted for CKD management    Chronic kidney disease stage IV  -Baseline creatinine 3 0-3 6  -Follows with [facility] kidney Dr Kayleen Vazquez  -Admission creatinine was 3 57 on 12/12  -Renal function has improved to creatinine 2 7 but trended up to 2 94 mg/dL and stable for last two days  Renal function still within baseline, will continue to monitor  -Restarted on Lasix 20 mg EO day on 12/21 and edema resolved  Renal function stable  Continue same dose   -Recommend restarting Veltassa at home dose at the time of discharge and check BMP in 1 week after discharge, would recommend follow-up with his outpatient nephrologist at [facility]    History of obstructive uropathy status post chronic bilateral PCN and now found to have right-sided hydronephrosis on CT on admission on 12/12, status post cystoscopy and right ureteral stent placement on 12/14/22  Continue management as per urology   -As per last note from 12/19, urology signed off and plan for outpatient follow-up and recommended removal of Shirley catheter prior to discharge    Primary hypertension  -Blood pressure Stable   -Currently on metoprolol 100 mg and isosorbide 30 mg daily  -Continue to hold Lasix 20 mg every other day  May restart on discharge        Gross hematuria/history of bladder cancer  -Status post cystoscopy and fulguration of bleeding points plus evacuation of clots on 12/4  -Required CBI, Shirley catheter removed on 12/20 and hematuria resolved  -Noted plan for outpatient follow-up and cystoscopy, fulguration and TURBT as outpatient in 3 months as well as plan for right ureteral stent exchange at the same time    History of hyperkalemia  -As outpatient on Veltassa every other day, recommend restarting at the time of discharge, continue low potassium diet during inpatient stay, potassium level currently normal range    Secondary hyperparathyroidism of renal origin continue calcitriol 0 25 mcg daily, monitor PTH level as outpatient    Anemia, iron deficiency  -Not on Epogen due to active malignancy  -Hemoglobin currently trending down to 7 5 g/dL despite resolution of hematuria     -Continue to monitor per primary team and recommend PRBC if it drops below 7  Iron saturation 19% with folate level more than 20  -On oral ferrous sulfate      Discussed with primary team    Nephrology Disposition Recommendations  Medically Stable for discharge from a Nephrology Standpoint Patient should have repeat bmp in 1 week with office follow up in  two weeks with his nephrologist at [address] kidney        SUBJECTIVE:  No chest pain or shortness of breath, Shirley catheter was removed on 12/20 and no hematuria    OBJECTIVE:  Current Weight: Weight - Scale: 93 4 kg (205 lb 14 6 oz)  Vitals:    12/21/22 0751   BP: 143/82   Pulse: 78   Resp: 18   Temp: (!) 97 3 °F (36 3 °C)   SpO2: 96%       Intake/Output Summary (Last 24 hours) at 12/21/2022 1034  Last data filed at 12/21/2022 0900  Gross per 24 hour   Intake 720 ml   Output 2425 ml   Net -1705 ml       Physical Exam   General:  Ill looking, awake  Eyes: Conjunctivae pink,  Sclera anicteric  ENT: lips and mucous membranes moist  Neck: supple   Chest: Clear to Auscultation both lungs,  no crackles, ronchus or wheezing  CVS: S1 & S2 present, normal rate, regular rhythm, no murmur    Abdomen: soft, non-tender, non-distended, Bowel sounds normoactive  Extremities: no edema of  legs  Skin: no rash  Neuro: awake, alert, oriented x 3   Psych: Mood and affect appropriate   Medications:    Current Facility-Administered Medications:   •  acetaminophen (TYLENOL) tablet 650 mg, 650 mg, Oral, Q6H PRN, An Yuen MD, 650 mg at 12/18/22 0844  •  ARIPiprazole (ABILIFY) tablet 2 mg, 2 mg, Oral, Daily, An Yuen MD, 2 mg at 12/21/22 8914  •  azelastine (ASTELIN) 0 1 % nasal spray 1 spray, 1 spray, Each Nare, BID, An Yuen MD, 1 spray at 12/19/22 0805  •  bimatoprost (LUMIGAN) 0 01 % ophthalmic solution 1 drop, 1 drop, Both Eyes, HS, An Yuen MD, 1 drop at 12/20/22 2216  •  calcitriol (ROCALTROL) capsule 0 25 mcg, 0 25 mcg, Oral, Daily, An Yuen MD, 0 25 mcg at 12/21/22 9590  •  cefTRIAXone (ROCEPHIN) 1,000 mg in dextrose 5 % 50 mL IVPB, 1,000 mg, Intravenous, Q24H, Tonya Cody DO, Last Rate: 100 mL/hr at 12/20/22 1619, 1,000 mg at 12/20/22 1619  •  DULoxetine (CYMBALTA) delayed release capsule 30 mg, 30 mg, Oral, Daily, An Yuen MD, 30 mg at 12/21/22 3301  •  ezetimibe (ZETIA) tablet 10 mg, 10 mg, Oral, Daily, An Yuen MD, 10 mg at 12/21/22 3038  •  ferrous sulfate tablet 325 mg, 325 mg, Oral, Daily With Breakfast, An Yuen MD, 325 mg at 12/21/22 2143  •  fluticasone (FLONASE) 50 mcg/act nasal spray 1 spray, 1 spray, Nasal, Daily, An Yuen MD, 1 spray at 12/21/22 9627  •  furosemide (LASIX) tablet 20 mg, 20 mg, Oral, Daily, Gonzalez Connelly MD, 20 mg at 12/21/22 2787  •  gabapentin (NEURONTIN) capsule 300 mg, 300 mg, Oral, HS, An Yuen MD, 300 mg at 12/20/22 2216  •  HYDROmorphone HCl (DILAUDID) injection 0 2 mg, 0 2 mg, Intravenous, Q4H PRN, Tonya Cody, DO, 0 2 mg at 12/20/22 1726  •  insulin glargine (LANTUS) subcutaneous injection 18 Units 0 18 mL, 18 Units, Subcutaneous, HS, An Yuen MD, 18 Units at 12/20/22 2217  •  insulin lispro (HumaLOG) 100 units/mL subcutaneous injection 1-5 Units, 1-5 Units, Subcutaneous, HS, An Yuen MD, 1 Units at 12/20/22 2217  •  insulin lispro (HumaLOG) 100 units/mL subcutaneous injection 1-6 Units, 1-6 Units, Subcutaneous, TID AC, 1 Units at 12/20/22 1209 **AND** Fingerstick Glucose (POCT), , , TID AC, Esau Myers MD  •  insulin lispro (HumaLOG) 100 units/mL subcutaneous injection 5 Units, 5 Units, Subcutaneous, TID With Meals, Esau Myers MD, 5 Units at 12/21/22 4130  •  isosorbide mononitrate (IMDUR) 24 hr tablet 30 mg, 30 mg, Oral, QAM, Esau Myers MD, 30 mg at 12/21/22 7544  •  metoprolol succinate (TOPROL-XL) 24 hr tablet 100 mg, 100 mg, Oral, Daily, Esau Myers MD, 100 mg at 12/21/22 0408  •  ondansetron (ZOFRAN) injection 4 mg, 4 mg, Intravenous, Q6H PRN, Esau Myers MD  •  oxybutynin (DITROPAN-XL) 24 hr tablet 10 mg, 10 mg, Oral, Daily, Esau Myers MD, 10 mg at 12/21/22 3595  •  oxyCODONE (ROXICODONE) IR tablet 2 5 mg, 2 5 mg, Oral, Q6H PRN, Tonya Cody DO  •  oxyCODONE (ROXICODONE) IR tablet 5 mg, 5 mg, Oral, Q6H PRN, Tonya Cody DO, 5 mg at 12/20/22 1619  •  pantoprazole (PROTONIX) EC tablet 40 mg, 40 mg, Oral, Early Morning, Esau Myers MD, 40 mg at 12/21/22 1696  •  polyethylene glycol (MIRALAX) packet 17 g, 17 g, Oral, Daily, Esau Myers MD, 17 g at 12/18/22 0831  •  senna (SENOKOT) tablet 17 2 mg, 2 tablet, Oral, BID, Esau Myers MD, 17 2 mg at 12/18/22 2907  •  tamsulosin (FLOMAX) capsule 0 4 mg, 0 4 mg, Oral, Daily With Miya Jeffers MD, 0 4 mg at 12/20/22 1619  •  zolpidem (AMBIEN) tablet 5 mg, 5 mg, Oral, HS PRN, Esau Myers MD, 5 mg at 12/21/22 0058    Invasive Devices:        Lab Results:   Results from last 7 days   Lab Units 12/21/22  0553 12/20/22  0557 12/19/22  0550 12/18/22  0556   WBC Thousand/uL 10 08 10 61*  --  10 21*   HEMOGLOBIN g/dL 7 5* 8 7*  --  7 7*   HEMATOCRIT % 23 7* 27 7*  --  23 9*   PLATELETS Thousands/uL 241 273  --  254   POTASSIUM mmol/L 4 3 4 6 4 5 4 0   CHLORIDE mmol/L 104 105 108 107   CO2 mmol/L 26 25 25 24   BUN mg/dL 45* 47* 42* 44*   CREATININE mg/dL 2 92* 2 94* 2 73* 2 79*   CALCIUM mg/dL 9 3 9 5 9 0 8 8   PHOSPHORUS mg/dL  --   --  3 4  --        Previous work up:     Reviewed CT abdomen pelvis report from 12/12 2 showed stable bladder wall thickening and compatible with known neoplasm     -new right-sided hydronephrosis and stable retroperitoneal lymphadenopathy as well as left pleural effusion and epicardial metastasis    Portions of the record may have been created with voice recognition software  Occasional wrong word or "sound a like" substitutions may have occurred due to the inherent limitations of voice recognition software  Read the chart carefully and recognize, using context, where substitutions have occurred  If you have any questions, please contact the dictating provider

## 2022-12-21 NOTE — CASE MANAGEMENT
Case Management Discharge Planning Note    Patient name Mary Kate Edi  Location [facility] 917/[facility] 060-76 MRN [id number]  : 1945 Date 2022       Current Admission Date: 2022  Current Admission Diagnosis:Hematuria   Patient Active Problem List    Diagnosis Date Noted   • Right wrist drop 2022   • Recurrent left pleural effusion 2022   • UTI (urinary tract infection) 2022   • SOB (shortness of breath) 2022   • History of tobacco use disorder 2022   • Retroperitoneal lymphadenopathy 2022   • Pulmonary nodules 2022   • Syncope and collapse 2022   • Depression with suicidal ideation 2022   • Cancer related pain 2022   • Bilateral hydronephrosis 04/10/2022   • CKD (chronic kidney disease) 2022   • Fall 2022   • Hyperkalemia 2022   • Retained ureteral stent    • 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[facility] [address] ) 2022   • Embolism and 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 ([address] ) 2022   • Abnormal CT scan, bladder 2022   • Port-A-Cath in place 2022   • Continuous opioid dependence ([address] ) 2021   • Hematuria 10/24/2021   • Acute urinary retention 10/21/2021   • Chronic pain syndrome 2021   • Lumbar spondylosis    • Chronic bronchitis ([address] ) 2021   • Moderate major depression, single episode ([address] ) 2021   • Thrombocytopenia ([address] ) 2021   • 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 2021   • Gross hematuria 2021   • PAD (peripheral artery disease) (HCC)    • Left carotid bruit    • Anemia of chronic disease 2020   • Preoperative clearance 2020   • Chronic anemia 10/10/2020   • Diabetic ulcer of left foot associated with type 2 diabetes mellitus, 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 ([address] ) 10/08/2020   • Acute kidney injury superimposed on CKD [facility])    • Dysuria 2020   • Diabetic polyneuropathy associated with type 2 diabetes mellitus ([facility] ) 2020 • Abnormal MRI, lumbar spine 06/29/2020   • Malignant neoplasm of anterior wall of urinary bladder ([facility] )    • Acute renal failure (ARF) ([facility] [facility] ) 02/12/2020   • Secondary renal hyperparathyroidism ([facility] ) 02/12/2020   • Preop examination 04/16/2019   • Superficial phlebitis 03/07/2019   • Arteriosclerosis of artery of extremity ([facility] ) 02/22/2019   • Stable angina pectoris ([facility] ) 02/22/2019   • Herpes zoster without complication 51/47/1062   • Localized edema 02/22/2019   • Back pain 01/31/2019   • Degeneration of lumbar intervertebral disc 12/03/2018   • Primary osteoarthritis of left knee 03/16/2018   • Bladder carcinoma ([facility] ) 08/16/2016   • Presence of stent in coronary artery 08/16/2016   • Hypertension with renal disease 10/29/2015   • Hyperlipidemia 10/29/2015   • Cystitis due to intravesical BCG administration 09/24/2015   • Coronary artery disease of native artery of native heart with stable angina pectoris ([facility] ) 05/19/2011   • Type 2 diabetes mellitus, with long-term current use of insulin ([facility] ) 01/09/2004      LOS (days): 9  Geometric Mean LOS (GMLOS) (days): 3 00  Days to GMLOS:-5 7     OBJECTIVE:  Risk of Unplanned Readmission Score: 60 04         Current admission status: Inpatient   Preferred Pharmacy:   [facility] [address] [facility], [address] [place] [address]  Phone: [phone] Fax: [phone]    Primary Care Provider: Fuentes Gracia MD    Primary Insurance: MEDICARE  Secondary Insurance: BLUE CROSS    DISCHARGE DETAILS:    Discharge planning discussed with[de-identified] Patient and his spouse Wilfrid Rivero of Choice: Yes     CM contacted family/caregiver?: Yes  Were Treatment Team discharge recommendations reviewed with patient/caregiver?: Yes  Did patient/caregiver verbalize understanding of patient care needs?: Yes  Were patient/caregiver advised of the risks associated with not following Treatment Team discharge recommendations?: Yes    Contacts  Patient Contacts: Jacoby Masters (spouse)  Relationship to Patient[de-identified] Family  Contact Method: Phone  Phone Number: [phone]  Reason/Outcome: Continuity of Care, Referral, Emergency Contact, Discharge Planning              Other Referral/Resources/Interventions Provided:  Referral Comments: Patient continues to be recommended for STR at time of discharge  Patient wishes to go to [facility] at this time  They have been updated  Family in agreement           Treatment Team Recommendation: Short Term Rehab  Discharge Destination Plan[de-identified] Short Term Rehab                                IMM Given (Date):: 12/21/22  IMM Given to[de-identified] Family  Family notified[de-identified] Jacoby Masters (spouse)

## 2022-12-21 NOTE — OCCUPATIONAL THERAPY NOTE
Occupational Therapy Progress Note     Patient Name: Claudia Berman  Today's Date: 12/21/2022  Problem List  Principal Problem:    Hematuria  Active Problems:    Coronary artery disease of native artery of native heart with stable angina pectoris ([address] )    Hypertension with renal disease    Type 2 diabetes mellitus, with long-term current use of insulin (HCC)    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)    PAD (peripheral artery disease) (HCC)    Moderate major depression, single episode (HCC)    Chronic pain syndrome    CKD (chronic kidney disease)    SOB (shortness of breath)    UTI (urinary tract infection)    Right wrist drop          12/21/22 1118   OT Last Visit   OT Visit Date 12/21/22   Note Type   Note Type Treatment   Pain Assessment   Pain Assessment Tool FLACC   Pain Location/Orientation Location: Abdomen   Hospital Pain Intervention(s) Ambulation/increased activity   Pain Rating: FLACC (Rest) - Face 0   Pain Rating: FLACC (Rest) - Legs 0   Pain Rating: FLACC (Rest) - Activity 0   Pain Rating: FLACC (Rest) - Cry 0   Pain Rating: FLACC (Rest) - Consolability 0   Score: FLACC (Rest) 0   Pain Rating: FLACC (Activity) - Face 0   Pain Rating: FLACC (Activity) - Legs 0   Pain Rating: FLACC (Activity) - Activity 0   Pain Rating: FLACC (Activity) - Cry 0   Pain Rating: FLACC (Activity) - Consolability 0   Score: FLACC (Activity) 0   Restrictions/Precautions   Weight Bearing Precautions Per Order No   Braces or Orthoses (S)  Other (Comment)  Resting Hand Splint    Other Precautions Fall Risk;Bed Alarm; Chair Alarm   Lifestyle   Autonomy PTA, pt reports being I with ADLs, IADLs, rollator for fnxl mobility, (-)    Reciprocal Relationships Spouse, ANNITA - pt reports someone is always home   Service to Others Retired   Intr[employer]   ADL   Where Assessed Chair   Grooming Assistance 4  Minimal Assistance   Grooming Deficit Verbal cueing;Teeth care;Wash/dry hands; Wash/dry face  (vc to use L hand to A with R hand 2* wrist drop)   Bed Mobility   Supine to Sit 3  Moderate assistance   Additional items Assist x 1;HOB elevated; Increased time required   Sit to Supine Unable to assess   Additional Comments Pt mod Ax1 for bed mobility  Pt was able to sit EOB for ~ 8 mins while participating in RUE exercises  Pt was left seated in chair at the end of session with all necessary items within reach and chair alarm on  Transfers   Sit to Stand 3  Moderate assistance   Additional items Increased time required;Verbal cues; Assist x 1  (SBA 2nd person for safety)   Stand to Sit 3  Moderate assistance   Additional items Assist x 1; Increased time required;Verbal cues  (SBA of 2nd person for safety)   Stand pivot 3  Moderate assistance   Additional items Assist x 1;Verbal cues; Increased time required  (SBA of 2nd person for safety)   Additional Comments Pt used upright rollator for transfers  Functional Mobility   Functional Mobility 3  Moderate assistance   Additional Comments Ax2   Additional items   (Able to demonstrate short distance household mobility w upright rollator and mod A )   Therapeutic Exercise - ROM   UE-ROM Yes   ROM- Right Upper Extremities   R Wrist PROM; Wrist extension;Wrist flexion   R Hand AROM;AAROM; Thumb; Index finger; Long finger;Little finger;Ring finger;Prolonged stretch  (Addressed decreased extension of R hand )   RUE ROM Comment Pt participated in 30 reps of R wrist extension w/ PROM  Pt w/ AAROM & AROM 20x finger extension on R hand  Pt w/ small therapy hand ball was able to participate in grasp strengthening exercises 20x  Cognition   Overall Cognitive Status WFL   Arousal/Participation Alert; Cooperative   Attention Within functional limits   Orientation Level Oriented X4   Memory Within functional limits   Following Commands Follows multistep commands with increased time or repetition   Comments Pt was pleasant and cooperative t/o session     Activity Tolerance Activity Tolerance Patient tolerated treatment well;Patient limited by fatigue   Medical Staff Made Aware RN made aware  PT, Michaela   Assessment   Assessment Pt was seen on 12/21/2022 for skilled OT session  OT session was focused on ADLs, education, functional mobility, R UE strengthening/exercise  Pt was agreeable to OT session  Pt was seen with PT 2* medical complexity  Pt was able to demonstrate bed mobility and transfers w/ mod Ax1  Pt was able to demonstrate short distanced household mobility with mod Ax2 using upright rollator  Pt was able to demonstrate grooming with min A and vc for compensatory techniques  Pt participated in wrist extension exercises 3 sets of 10, finger extension 2 sets of 10, and grasp strengthening activity with therapy ball 20x  Pt is limited 2* wrist drop and decreased strength in wrist  Pt is limited 2* pain, endurance, activity tolerance, functional mobility, balance, functional standing tolerance, decreased I w/ ADLS/IADLS and coordination deficits  The following Occupational Performance Areas to address include: eating, grooming, bathing/shower, toilet hygiene, dressing, health maintenance and functional mobility  The patient's raw score on the -PAC Daily Activity inpatient short form is 14, standardized score is 33 39, less than 39 4  Patients at this level are likely to benefit from discharge to post-acute rehabilitation services  Please refer to the recommendation of the Occupational Therapist for safe discharge planning  Pt will continue to benefit from skilled OT services 3-5x a wk to address following goals 2-4x a week for 10-14 days  OT recommendations for d/c include post acute rehab  Plan   Treatment Interventions ADL retraining;Functional transfer training;UE strengthening/ROM; Endurance training;Patient/family training;Equipment evaluation/education; Neuromuscular reeducation; Fine motor coordination activities; Compensatory technique education;Continued evaluation; Energy conservation; Activityengagement   Goal Expiration Date 12/30/22   OT Treatment Day 1   OT Frequency   (2-4x)   Recommendation   OT Discharge Recommendation Post acute rehabilitation services   AM-PAC Daily Activity Inpatient   Lower Body Dressing 2   Bathing 2   Toileting 2   Upper Body Dressing 2   Grooming 3   Eating 3   Daily Activity Raw Score 14   Daily Activity Standardized Score (Calc for Raw Score >=11) 33 39   AM-PAC Applied Cognition Inpatient   Following a Speech/Presentation 3   Understanding Ordinary Conversation 4   Taking Medications 4   Remembering Where Things Are Placed or Put Away 4   Remembering List of 4-5 Errands 3   Taking Care of Complicated Tasks 3   Applied Cognition Raw Score 21   Applied Cognition Standardized Score 44 3   Modified [place] Scale   Modified Daniel Scale 4   End of Consult   Education Provided Yes  (splinting, compensatory techniques)   Patient Position at End of Consult Bedside chair;Bed/Chair alarm activated; All needs within reach   Nurse Communication Nurse aware of consult   End of Consult Comments Pt was left seated in chair with all necessary items within reach  Chair alarm on           MARTIN White/FREDERICK

## 2022-12-21 NOTE — PLAN OF CARE
Problem: PHYSICAL THERAPY ADULT  Goal: Performs mobility at highest level of function for planned discharge setting  See evaluation for individualized goals  Description: Treatment/Interventions: ADL retraining, Functional transfer training, LE strengthening/ROM, Patient/family training, Equipment eval/education, Bed mobility, Gait training, Spoke to nursing, Spoke to case management, OT          See flowsheet documentation for full assessment, interventions and recommendations  Outcome: Progressing  Note: Prognosis: Fair  Problem List: Decreased strength, Decreased endurance, Impaired balance, Decreased mobility  Assessment: PT initiated treatment session in order to assist patient in achieving goals to improve transfers, gait training, and overall activity tolerance  Patient demonstrated progress toward achieving functional mobility goals as evidenced by increased ambulatory distance and requiring less physical assistance  Patient mod-AX1 for bed mobility, sit<-->stand transfers, and ambulation  He progressed ambulation, walking 60 feet with his upright rollator w/ VC for safety and 2nd person for chair follow due to reduced strength and activity tolerance  Throughout treatment session patient required both verbal and tactile cuing to improve safety, efficiency, and mechanics of mobility in addition to hands on assistance for all aspects of functional mobility  Additionally, he required increased time to execute specific mobility tasks with rest breaks in between secondary to gross fatigue and weakness  PT d/c recommendation remains for rehab  Patient will continue to benefit from continued skilled PT this admission to achieve maximal function and safety  Barriers to Discharge: Inaccessible home environment, Decreased caregiver support     PT Discharge Recommendation: Post acute rehabilitation services    See flowsheet documentation for full assessment

## 2022-12-22 ENCOUNTER — APPOINTMENT (OUTPATIENT)
Dept: PHYSICAL THERAPY | Facility: REHABILITATION | Age: 77
End: 2022-12-22

## 2022-12-22 VITALS
DIASTOLIC BLOOD PRESSURE: 87 MMHG | TEMPERATURE: 97.3 F | SYSTOLIC BLOOD PRESSURE: 161 MMHG | BODY MASS INDEX: 25.05 KG/M2 | RESPIRATION RATE: 18 BRPM | WEIGHT: 201.5 LBS | HEIGHT: 75 IN | OXYGEN SATURATION: 97 % | HEART RATE: 78 BPM

## 2022-12-22 LAB
ANION GAP SERPL CALCULATED.3IONS-SCNC: 9 MMOL/L (ref 4–13)
BASOPHILS # BLD AUTO: 0.05 THOUSANDS/ÂΜL (ref 0–0.1)
BASOPHILS NFR BLD AUTO: 0 % (ref 0–1)
BUN SERPL-MCNC: 44 MG/DL (ref 5–25)
CALCIUM SERPL-MCNC: 9.4 MG/DL (ref 8.3–10.1)
CHLORIDE SERPL-SCNC: 103 MMOL/L (ref 96–108)
CO2 SERPL-SCNC: 27 MMOL/L (ref 21–32)
CREAT SERPL-MCNC: 2.79 MG/DL (ref 0.6–1.3)
EOSINOPHIL # BLD AUTO: 0.3 THOUSAND/ÂΜL (ref 0–0.61)
EOSINOPHIL NFR BLD AUTO: 3 % (ref 0–6)
ERYTHROCYTE [DISTWIDTH] IN BLOOD BY AUTOMATED COUNT: 16.7 % (ref 11.6–15.1)
FLUAV RNA RESP QL NAA+PROBE: NEGATIVE
FLUBV RNA RESP QL NAA+PROBE: NEGATIVE
GFR SERPL CREATININE-BSD FRML MDRD: 20 ML/MIN/1.73SQ M
GLUCOSE SERPL-MCNC: 105 MG/DL (ref 65–140)
GLUCOSE SERPL-MCNC: 65 MG/DL (ref 65–140)
GLUCOSE SERPL-MCNC: 79 MG/DL (ref 65–140)
HCT VFR BLD AUTO: 22.7 % (ref 36.5–49.3)
HGB BLD-MCNC: 7.2 G/DL (ref 12–17)
IMM GRANULOCYTES # BLD AUTO: 0.08 THOUSAND/UL (ref 0–0.2)
IMM GRANULOCYTES NFR BLD AUTO: 1 % (ref 0–2)
LYMPHOCYTES # BLD AUTO: 2.09 THOUSANDS/ÂΜL (ref 0.6–4.47)
LYMPHOCYTES NFR BLD AUTO: 18 % (ref 14–44)
MCH RBC QN AUTO: 30.9 PG (ref 26.8–34.3)
MCHC RBC AUTO-ENTMCNC: 31.7 G/DL (ref 31.4–37.4)
MCV RBC AUTO: 97 FL (ref 82–98)
MONOCYTES # BLD AUTO: 0.85 THOUSAND/ÂΜL (ref 0.17–1.22)
MONOCYTES NFR BLD AUTO: 7 % (ref 4–12)
NEUTROPHILS # BLD AUTO: 8.12 THOUSANDS/ÂΜL (ref 1.85–7.62)
NEUTS SEG NFR BLD AUTO: 71 % (ref 43–75)
NRBC BLD AUTO-RTO: 0 /100 WBCS
PLATELET # BLD AUTO: 276 THOUSANDS/UL (ref 149–390)
PMV BLD AUTO: 10.5 FL (ref 8.9–12.7)
POTASSIUM SERPL-SCNC: 4.4 MMOL/L (ref 3.5–5.3)
RBC # BLD AUTO: 2.33 MILLION/UL (ref 3.88–5.62)
RSV RNA RESP QL NAA+PROBE: NEGATIVE
SARS-COV-2 RNA RESP QL NAA+PROBE: NEGATIVE
SODIUM SERPL-SCNC: 139 MMOL/L (ref 135–147)
WBC # BLD AUTO: 11.49 THOUSAND/UL (ref 4.31–10.16)

## 2022-12-22 RX ORDER — OXYCODONE HYDROCHLORIDE 5 MG/1
5 TABLET ORAL EVERY 6 HOURS PRN
Qty: 12 TABLET | Refills: 0 | Status: SHIPPED | OUTPATIENT
Start: 2022-12-22 | End: 2023-01-04

## 2022-12-22 RX ORDER — INSULIN LISPRO 100 [IU]/ML
5 INJECTION, SOLUTION INTRAVENOUS; SUBCUTANEOUS
Refills: 0 | Status: ON HOLD
Start: 2022-12-22

## 2022-12-22 RX ADMIN — OXYCODONE HYDROCHLORIDE 5 MG: 5 TABLET ORAL at 04:38

## 2022-12-22 RX ADMIN — METOPROLOL SUCCINATE 100 MG: 100 TABLET, EXTENDED RELEASE ORAL at 08:30

## 2022-12-22 RX ADMIN — FUROSEMIDE 20 MG: 20 TABLET ORAL at 08:30

## 2022-12-22 RX ADMIN — DULOXETINE 30 MG: 30 CAPSULE, DELAYED RELEASE ORAL at 08:30

## 2022-12-22 RX ADMIN — SENNOSIDES 17.2 MG: 8.6 TABLET, FILM COATED ORAL at 08:30

## 2022-12-22 RX ADMIN — BNT162B2 ORIGINAL AND OMICRON BA.4/BA.5 0.3 ML: .1125; .1125 INJECTION, SUSPENSION INTRAMUSCULAR at 12:14

## 2022-12-22 RX ADMIN — INSULIN LISPRO 5 UNITS: 100 INJECTION, SOLUTION INTRAVENOUS; SUBCUTANEOUS at 08:31

## 2022-12-22 RX ADMIN — EZETIMIBE 10 MG: 10 TABLET ORAL at 08:30

## 2022-12-22 RX ADMIN — CALCITRIOL CAPSULES 0.25 MCG 0.25 MCG: 0.25 CAPSULE ORAL at 08:30

## 2022-12-22 RX ADMIN — PANTOPRAZOLE SODIUM 40 MG: 40 TABLET, DELAYED RELEASE ORAL at 04:38

## 2022-12-22 RX ADMIN — ARIPIPRAZOLE 2 MG: 2 TABLET ORAL at 08:31

## 2022-12-22 RX ADMIN — FERROUS SULFATE TAB 325 MG (65 MG ELEMENTAL FE) 325 MG: 325 (65 FE) TAB at 08:30

## 2022-12-22 RX ADMIN — ISOSORBIDE MONONITRATE 30 MG: 30 TABLET, EXTENDED RELEASE ORAL at 08:30

## 2022-12-22 RX ADMIN — OXYBUTYNIN 10 MG: 10 TABLET, FILM COATED, EXTENDED RELEASE ORAL at 08:30

## 2022-12-22 RX ADMIN — ZOLPIDEM TARTRATE 5 MG: 5 TABLET, COATED ORAL at 00:56

## 2022-12-22 NOTE — PLAN OF CARE
Problem: Nutrition/Hydration-ADULT  Goal: Nutrient/Hydration intake appropriate for improving, restoring or maintaining nutritional needs  Description: Monitor and assess patient's nutrition/hydration status for malnutrition  Collaborate with interdisciplinary team and initiate plan and interventions as ordered  Monitor patient's weight and dietary intake as ordered or per policy  Utilize nutrition screening tool and intervene as necessary  Determine patient's food preferences and provide high-protein, high-caloric foods as appropriate       INTERVENTIONS:  - Monitor oral intake, urinary output, labs, and treatment plans  - Assess nutrition and hydration status and recommend course of action  - Evaluate amount of meals eaten  - Assist patient with eating if necessary   - Allow adequate time for meals  - Recommend/ encourage appropriate diets, oral nutritional supplements, and vitamin/mineral supplements  - Order, calculate, and assess calorie counts as needed  - Recommend, monitor, and adjust tube feedings and TPN/PPN based on assessed needs  - Assess need for intravenous fluids  - Provide specific nutrition/hydration education as appropriate  - Include patient/family/caregiver in decisions related to nutrition  12/22/2022 1050 by Becky Fermin RN  Outcome: Adequate for Discharge  12/22/2022 1050 by Becky Fermin RN  Outcome: Progressing  12/22/2022 0954 by Becky Fermin RN  Outcome: Progressing     Problem: MOBILITY - ADULT  Goal: Maintain or return to baseline ADL function  Description: INTERVENTIONS:  -  Assess patient's ability to carry out ADLs; assess patient's baseline for ADL function and identify physical deficits which impact ability to perform ADLs (bathing, care of mouth/teeth, toileting, grooming, dressing, etc )  - Assess/evaluate cause of self-care deficits   - Assess range of motion  - Assess patient's mobility; develop plan if impaired  - Assess patient's need for assistive devices and provide as appropriate  - Encourage maximum independence but intervene and supervise when necessary  - Involve family in performance of ADLs  - Assess for home care needs following discharge   - Consider OT consult to assist with ADL evaluation and planning for discharge  - Provide patient education as appropriate  12/22/2022 1050 by Bishop Sullivan RN  Outcome: Adequate for Discharge  12/22/2022 1050 by Bishop Sullivan RN  Outcome: Progressing  12/22/2022 0954 by Bishop Sullivan RN  Outcome: Progressing  Goal: Maintains/Returns to pre admission functional level  Description: INTERVENTIONS:  - Perform BMAT or MOVE assessment daily    - Set and communicate daily mobility goal to care team and patient/family/caregiver  - Collaborate with rehabilitation services on mobility goals if consulted  - Perform Range of Motion  times a day  - Reposition patient every  hours    - Dangle patient  times a day  - Stand patient  times a day  - Ambulate patient  times a day  - Out of bed to chair  times a day   - Out of bed for meals  times a day  - Out of bed for toileting  - Record patient progress and toleration of activity level   12/22/2022 1050 by Bishop Sullivan RN  Outcome: Adequate for Discharge  12/22/2022 1050 by Bishop Sullivan RN  Outcome: Progressing  12/22/2022 0954 by Bishop Sullivan RN  Outcome: Progressing     Problem: Potential for Falls  Goal: Patient will remain free of falls  Description: INTERVENTIONS:  - Educate patient/family on patient safety including physical limitations  - Instruct patient to call for assistance with activity   - Consult OT/PT to assist with strengthening/mobility   - Keep Call bell within reach  - Keep bed low and locked with side rails adjusted as appropriate  - Keep care items and personal belongings within reach  - Initiate and maintain comfort rounds  - Make Fall Risk Sign visible to staff  - Offer Toileting every  Hours, in advance of need  - Initiate/Maintain alarm  - Obtain necessary fall risk management equipme  - Apply yellow socks and bracelet for high fall risk patients  - Consider moving patient to room near nurses station  12/22/2022 1050 by Jodee Frank RN  Outcome: Adequate for Discharge  12/22/2022 1050 by Jodee Frank RN  Outcome: Progressing  12/22/2022 0954 by Jodee Frank RN  Outcome: Progressing     Problem: Prexisting or High Potential for Compromised Skin Integrity  Goal: Skin integrity is maintained or improved  Description: INTERVENTIONS:  - Identify patients at risk for skin breakdown  - Assess and monitor skin integrity  - Assess and monitor nutrition and hydration status  - Monitor labs   - Assess for incontinence   - Turn and reposition patient  - Assist with mobility/ambulation  - Relieve pressure over bony prominences  - Avoid friction and shearing  - Provide appropriate hygiene as needed including keeping skin clean and dry  - Evaluate need for skin moisturizer/barrier cream  - Collaborate with interdisciplinary team   - Patient/family teaching  - Consider wound care consult   12/22/2022 1050 by Jodee Frank RN  Outcome: Adequate for Discharge  12/22/2022 1050 by Jodee Frank RN  Outcome: Progressing  12/22/2022 0954 by Jodee Frank RN  Outcome: Progressing

## 2022-12-22 NOTE — CASE MANAGEMENT
Case Management Discharge Planning Note    Patient name Jose Jiménez  Location [facility]-82 MRN [id number]  : 1945 Date 2022       Current Admission Date: 2022  Current Admission Diagnosis:Hematuria   Patient Active Problem List    Diagnosis Date Noted   • Right wrist drop 2022   • Recurrent left pleural effusion 2022   • UTI (urinary tract infection) 2022   • SOB (shortness of breath) 2022   • History of tobacco use disorder 2022   • Retroperitoneal lymphadenopathy 2022   • Pulmonary nodules 2022   • Syncope and collapse 2022   • Depression with suicidal ideation 2022   • Cancer related pain 2022   • Bilateral hydronephrosis 04/10/2022   • CKD (chronic kidney disease) 2022   • Fall 2022   • Hyperkalemia 2022   • Retained ureteral stent    • 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[address] ) 2022   • Embolism and 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 ([address] ) 2022   • Abnormal CT scan, bladder 2022   • Port-A-Cath in place 2022   • Continuous opioid dependence ([address] ) 2021   • Hematuria 10/24/2021   • Acute urinary retention 10/21/2021   • Chronic pain syndrome 2021   • Lumbar spondylosis    • Chronic bronchitis ([address] ) 2021   • Moderate major depression, single episode ([address] ) 2021   • Thrombocytopenia ([address] ) 2021   • 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 2021   • Gross hematuria 2021   • PAD (peripheral artery disease) ([facility])    • Left carotid bruit    • Anemia of chronic disease 2020   • Preoperative clearance 2020   • Chronic anemia 10/10/2020   • Diabetic ulcer of left foot associated with type 2 diabetes mellitus, 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 ([address] ) 10/08/2020   • Acute kidney injury superimposed on CKD [facility])    • Dysuria 2020   • Diabetic polyneuropathy associated with type 2 diabetes mellitus ([facility] ) 2020 • Abnormal MRI, lumbar spine 06/29/2020   • Malignant neoplasm of anterior wall of urinary bladder ([facility] )    • Acute renal failure (ARF) ([facility] ) 02/12/2020   • Secondary renal hyperparathyroidism ([facility] ) 02/12/2020   • Preop examination 04/16/2019   • Superficial phlebitis 03/07/2019   • Arteriosclerosis of artery of extremity ([facility] ) 02/22/2019   • Stable angina pectoris ([facility] ) 02/22/2019   • Herpes zoster without complication 51/21/4999   • Localized edema 02/22/2019   • Back pain 01/31/2019   • Degeneration of lumbar intervertebral disc 12/03/2018   • Primary osteoarthritis of left knee 03/16/2018   • Bladder carcinoma ([facility] ) 08/16/2016   • Presence of stent in coronary artery 08/16/2016   • Hypertension with renal disease 10/29/2015   • Hyperlipidemia 10/29/2015   • Cystitis due to intravesical BCG administration 09/24/2015   • Coronary artery disease of native artery of native heart with stable angina pectoris ([facility] ) 05/19/2011   • Type 2 diabetes mellitus, with long-term current use of insulin ([facility] ) 01/09/2004      LOS (days): 10  Geometric Mean LOS (GMLOS) (days): 3 00  Days to GMLOS:-6 7     OBJECTIVE:  Risk of Unplanned Readmission Score: 57 54         Current admission status: Inpatient   Preferred Pharmacy:   [facility] [address] [place] [address]  Phone: [phone] Fax: [phone]    [address] [place] [address], [address] [place] [address]  [address]  [address] [place] [address]  Phone: [phone] Fax: [phone]    Primary Care Provider: Nayeli Fischer MD    Primary Insurance: MEDICARE  Secondary Insurance: BLUE CROSS    DISCHARGE DETAILS:    Discharge planning discussed with[de-identified] Patient and his spouse Mark Powers of Choice: Yes     CM contacted family/caregiver?: Yes  Were Treatment Team discharge recommendations reviewed with patient/caregiver?: Yes  Did patient/caregiver verbalize understanding of patient care needs?: Yes  Were patient/caregiver advised of the risks associated with not following Treatment Team discharge recommendations?: Yes    Contacts  Patient Contacts: Shania Haddad (spouse)  Relationship to Patient[de-identified] Family  Contact Method: Phone  Phone Number: [phone]  Reason/Outcome: Continuity of Care, Referral, Emergency Contact, Discharge Planning              Other Referral/Resources/Interventions Provided:  Referral Comments: Patient no longer in agreement with d/c to [facility]  He states that he wants to go to [address]  Referral was sent and they are able to accept the patient for admission today pending COVID test and booster vaccine  Patient in agreement with both  Orders placed and pending  Transport requested at this time and currently awaiting pickup time           Treatment Team Recommendation: Short Term Rehab  Discharge Destination Plan[de-identified] [address] [place] : [address]  Receiving Facility/Agency Phone Number: [phone]  Facility/Agency Fax Number: [phone]

## 2022-12-22 NOTE — DISCHARGE SUMMARY
Discharge Summary - [facility]  Patient: Zay Castro 76 y o  male   MRN: [id number]  PCP: Ascencion White MD  Unit/Bed#: [facility] 397-56 Encounter: [id number]            Discharging Physician / Practitioner: Marco Bassett MD  PCP: Ascencion White MD  Admission Date:   Admission Orders (From admission, onward)     Ordered        12/12/22 [facility] [address] [address]  Once                      Discharge Date: 12/22/22      Reason for Admission:  Hematuria      Discharge Diagnoses:     Principal Problem:    Hematuria (resolved)    Active Problems:    Coronary artery disease    Obstructive uropathy    Coronary artery disease    Essential hypertension    Insulin-dependent diabetes mellitus    Depression    PAD (peripheral artery disease)     CKD (chronic kidney disease) stage 4    Right wrist drop    Resolved Problems:    Suspected UTI    Shortness of breath      Consultations During Hospital Stay:  · Nephrology  · Urology  · Neurology      Hospital Course:     * Hematuria  Assessment & Plan  • Acute on chronic issue  • CT on admission revealed stable bladder wall thickening toward the left compatible with known neoplasm, new right hydronephrosis with high density intraluminal material in the distal right ureter likely representing blood, stable metastatic retroperitoneal lymphadenopathy, and 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  • Urology input appreciated - s/p continuous bladder irrigation  • Patient with bilateral nephrostomy tubes and had a Shirley placed in ED -> passed voiding trial today -> plan for discharge to transitional care facility today     Suspected UTI (urinary tract infection)  Assessment & Plan  • PMH of Recurrent UTI on suppressive bactrim at home, presented with UTI  Edwin Gracia need to hold Bactrim given acute on chronic renal failure  • Was placed on cefepime on admission    Urine culture growing Enterobacter and Enterococcus species, however, possible colonization to Enterococcus given lack of fever despite resistance pattern on sensitivities ? ? -> antibiotic regimen de-escalated to IV Rocephin now completed     Right wrist drop  • XR imaging unremarkable - RUE venous Doppler negative for DVT or superficial thrombophlebitis and reported normal response to augmentation maneuvers  • Ongoing PT/OT evaluation -> splint placed     SOB (shortness of breath)  Assessment & Plan  • Known history of Pleur-X - small left pleural effusion similar to last month on CXR     CKD (chronic kidney disease) stage 4  Assessment & Plan  • Baseline creatinine fluctuating but approximately around 3 1-3 6 - mildly improved at 2 79  on day of discharge  • Nephrology input appreciated -> recommending resuming V[place]assa at time of discharge and BMP check 1 week post-discharge     Depression  Assessment & Plan  • Continue Cymbalta/Abilify     PAD (peripheral artery disease)  Assessment & Plan  • Continue Zetia - resumed ASA on discharge     Insulin-dependent diabetes mellitus  Assessment & Plan            Lab Results   Component Value Date     HGBA1C 7 2 (H) 10/20/2022      • Maintained on basal/prandial insulin w/ additional SSI coverage per [employer]ks -resume home regimen on discharge     Obstructive uropathy  Assessment & Plan  • In the setting of known bladder cancer status post BCG and subsequent chemotherapy/radiation s/p bilateral chronic PCN tubes  • Noted to have new right-sided hydronephrosis on CT imaging this admission status post otoscopy w/ right ureteral stenting on 12/14  • Continue Flomax/Ditropan  • Shirley catheter removed  • Outpatient follow-up w/ urology     Coronary artery disease   Assessment & Plan  • Resumed ASA on discharge  • Continue Imdur/Toprol-XL  • Status post prior coronary stenting     Essential hypertension  Assessment & Plan  • Continue Imdur/Toprol-XL      Condition at Discharge: fair       Discharge Day Visit / Exam:     Vitals: Blood Pressure: 161/87 (12/22/22 3929)  Pulse: 78 (12/22/22 0709)  Temperature: (!) 97 3 °F (36 3 °C) (12/22/22 0709)  Temp Source: Temporal (12/20/22 0742)  Respirations: 18 (12/21/22 2221)  Height: 6' 3" (190 5 cm) (12/13/22 1356)  Weight - Scale: 91 4 kg (201 lb 8 oz) (12/22/22 0600)  SpO2: 97 % (12/22/22 0709)      Physical exam - I had a face-to-face encounter with the patient on day of discharge  Discussion with Patient and/or Family:  The patient has been advised to return to the ER immediately if any symptoms recur or worsen  Discharge instructions/Information to Patient and/or Family:   See after visit summary for information provided to patient and/or family  Provisions for Follow-Up Care:  See after visit summary for information related to follow-up care and any pertinent home health orders  Disposition:   Transitional care facility      Discharge Medications:  See after visit summary for reconciled discharge medications provided to patient and/or family  Discharge Statement:  I spent 38 minutes discharging the patient  This time was spent on the day of discharge  I had direct contact with the patient on the day of discharge  Greater than 50% of the total time was spent examining patient, answering all patient questions, arranging and discussing plan of care with patient as well as directly providing post-discharge instructions  Additional time then spent on discharge activities  ** Please Note: This note is constructed using a voice recognition dictation system  An occasional wrong word/phrase or “sound-a-like” substitution may have been picked up by dictation device due to the inherent limitations of voice recognition software  Read the chart carefully and recognize, using reasonable context, where substitutions may have occurred  **

## 2022-12-22 NOTE — PROGRESS NOTES
NEPHROLOGY PROGRESS NOTE   María Torres 76 y o  male MRN: [id number]  Unit/Bed#: [facility] 917-01 Encounter: [id number]    ASSESSMENT & PLAN:  75-year-old with history of bladder cancer status post bilateral nephrostomy tube was admitted for gross hematuria and bloody drainage from nephrostomy tube  CT was suggestive of right-sided hydronephrosis  Urology was consulted and underwent right ureteral stent placement  Nephrology consulted for CKD management    Chronic kidney disease stage IV  -Baseline creatinine 3 0-3 6  -Follows with [place] [facility] Dr Zara Guerra  -Admission creatinine was 3 57 on 12/12  -Renal function has improved to creatinine 2 7 but trended up to 2 94 mg/dL but again improving to 2 79 mg/dL today despite being on Lasix 20 mg every other day since 12/21  Continue Lasix at current dose  Clinically euvolemic  -Recommend restarting Veltassa at home dose at the time of discharge and check BMP in 1 week after discharge, would recommend follow-up with his outpatient nephrologist at [facility]  If going to [facility] can check blood work at [facility]  Please call nephrology service if any issues    History of obstructive uropathy status post chronic bilateral PCN and now found to have right-sided hydronephrosis on CT on admission on 12/12, status post cystoscopy and right ureteral stent placement on 12/14/22    Continue management as per urology   -As per last note from 12/19, urology signed off and plan for outpatient follow-up and recommended removal of Shirley catheter prior to discharge    Primary hypertension  -Blood pressure stable   -Currently on metoprolol 100 mg and isosorbide 30 mg daily  -Continue Lasix 20 mg every other day which was started on 12/21    Gross hematuria/history of bladder cancer  -Status post cystoscopy and fulguration of bleeding points plus evacuation of clots on 12/4  -Required CBI, Shirley catheter removed on 12/20 and hematuria resolved  -Noted plan for outpatient follow-up and cystoscopy, fulguration and TURBT as outpatient in 3 months as well as plan for right ureteral stent exchange at the same time    History of hyperkalemia  -As outpatient on Veltassa every other day, recommend restarting at the time of discharge, continue low potassium diet during inpatient stay, potassium level currently normal range    Secondary hyperparathyroidism of renal origin continue calcitriol 0 25 mcg daily, monitor PTH level as outpatient    Anemia, iron deficiency  -Not on Epogen due to active malignancy  -Hemoglobin currently trending down to 7 2 g/dL despite resolution of hematuria     -Continue to monitor per primary team and recommend PRBC if it drops below 7  Iron saturation 19% with folate level more than 20  -On oral ferrous sulfate    Discussed with primary team    Nephrology Disposition Recommendations  Medically Stable for discharge from a Nephrology Standpoint Patient should have repeat bmp in 1 week with office follow up in  two weeks with his nephrologist at [id number] kidney        SUBJECTIVE:  No new complaints, no hematuria no shortness of breath    OBJECTIVE:  Current Weight: Weight - Scale: 91 4 kg (201 lb 8 oz)  Vitals:    12/22/22 0709   BP: 161/87   Pulse: 78   Resp:    Temp: (!) 97 3 °F (36 3 °C)   SpO2: 97%       Intake/Output Summary (Last 24 hours) at 12/22/2022 1458  Last data filed at 12/22/2022 1024  Gross per 24 hour   Intake 20 ml   Output 1975 ml   Net -1955 ml       Physical Exam   General:  Ill looking, awake  Eyes: Conjunctivae pink,  Sclera anicteric  ENT: lips and mucous membranes moist  Neck: supple   Chest: Clear to Auscultation both lungs,  no crackles, ronchus or wheezing  CVS: S1 & S2 present, normal rate, regular rhythm, no murmur    Abdomen: soft, non-tender, non-distended, Bowel sounds normoactive  Extremities: no edema of  legs  Skin: no rash  Neuro: awake, alert, oriented x 3   Psych: Mood and affect appropriate     Medications:    Current Facility-Administered Medications:   •  acetaminophen (TYLENOL) tablet 650 mg, 650 mg, Oral, Q6H PRN, An Yuen MD, 650 mg at 12/18/22 0844  •  ARIPiprazole (ABILIFY) tablet 2 mg, 2 mg, Oral, Daily, An Yuen MD, 2 mg at 12/22/22 0831  •  azelastine (ASTELIN) 0 1 % nasal spray 1 spray, 1 spray, Each Nare, BID, An Yuen MD, 1 spray at 12/19/22 0805  •  bimatoprost (LUMIGAN) 0 01 % ophthalmic solution 1 drop, 1 drop, Both Eyes, HS, An Yuen MD, 1 drop at 12/21/22 2154  •  calcitriol (ROCALTROL) capsule 0 25 mcg, 0 25 mcg, Oral, Daily, An Yuen MD, 0 25 mcg at 12/22/22 0830  •  DULoxetine (CYMBALTA) delayed release capsule 30 mg, 30 mg, Oral, Daily, An Yuen MD, 30 mg at 12/22/22 0830  •  ezetimibe (ZETIA) tablet 10 mg, 10 mg, Oral, Daily, An Yuen MD, 10 mg at 12/22/22 0830  •  ferrous sulfate tablet 325 mg, 325 mg, Oral, Daily With Breakfast, An Yuen MD, 325 mg at 12/22/22 0830  •  fluticasone (FLONASE) 50 mcg/act nasal spray 1 spray, 1 spray, Nasal, Daily, An Yuen MD, 1 spray at 12/21/22 6391  •  furosemide (LASIX) tablet 20 mg, 20 mg, Oral, Daily, Gonzalez Connelly MD, 20 mg at 12/22/22 0830  •  gabapentin (NEURONTIN) capsule 300 mg, 300 mg, Oral, HS, An Yuen MD, 300 mg at 12/21/22 2154  •  HYDROmorphone HCl (DILAUDID) injection 0 2 mg, 0 2 mg, Intravenous, Q4H PRN, Tonya Cody DO, 0 2 mg at 12/20/22 1726  •  insulin glargine (LANTUS) subcutaneous injection 18 Units 0 18 mL, 18 Units, Subcutaneous, HS, An Yuen MD, 18 Units at 12/21/22 2154  •  insulin lispro (HumaLOG) 100 units/mL subcutaneous injection 1-5 Units, 1-5 Units, Subcutaneous, HS, An Yuen MD, 1 Units at 12/20/22 2217  •  insulin lispro (HumaLOG) 100 units/mL subcutaneous injection 1-6 Units, 1-6 Units, Subcutaneous, TID AC, 1 Units at 12/21/22 1654 **AND** Fingerstick Glucose (POCT), , , TID AC, Demario Hernandez Herminia Kumar MD  •  insulin lispro (HumaLOG) 100 units/mL subcutaneous injection 5 Units, 5 Units, Subcutaneous, TID With Meals, Jacy Stiles MD, 5 Units at 12/22/22 0831  •  isosorbide mononitrate (IMDUR) 24 hr tablet 30 mg, 30 mg, Oral, QAM, Jacy Stiles MD, 30 mg at 12/22/22 0830  •  metoprolol succinate (TOPROL-XL) 24 hr tablet 100 mg, 100 mg, Oral, Daily, Jacy Stiles MD, 100 mg at 12/22/22 0830  •  ondansetron (ZOFRAN) injection 4 mg, 4 mg, Intravenous, Q6H PRN, Jacy Stiles MD  •  oxybutynin (DITROPAN-XL) 24 hr tablet 10 mg, 10 mg, Oral, Daily, Jacy Stiles MD, 10 mg at 12/22/22 0830  •  oxyCODONE (ROXICODONE) IR tablet 2 5 mg, 2 5 mg, Oral, Q6H PRN, Tonya Cody DO  •  oxyCODONE (ROXICODONE) IR tablet 5 mg, 5 mg, Oral, Q6H PRN, Tonya Cody DO, 5 mg at 12/22/22 2504  •  pantoprazole (PROTONIX) EC tablet 40 mg, 40 mg, Oral, Early Morning, Jacy Stiles MD, 40 mg at 12/22/22 0362  •  polyethylene glycol (MIRALAX) packet 17 g, 17 g, Oral, Daily, Jacy Stiles MD, 17 g at 12/18/22 0831  •  senna (SENOKOT) tablet 17 2 mg, 2 tablet, Oral, BID, Jacy Stiles MD, 17 2 mg at 12/22/22 0830  •  tamsulosin (FLOMAX) capsule 0 4 mg, 0 4 mg, Oral, Daily With Octavia Sales MD, 0 4 mg at 12/21/22 1655  •  zolpidem (AMBIEN) tablet 5 mg, 5 mg, Oral, HS PRN, Jacy Stiles MD, 5 mg at 12/22/22 0056    Invasive Devices:        Lab Results:   Results from last 7 days   Lab Units 12/22/22  0526 12/21/22  0553 12/20/22  0557 12/19/22  0550   WBC Thousand/uL 11 49* 10 08 10 61*  --    HEMOGLOBIN g/dL 7 2* 7 5* 8 7*  --    HEMATOCRIT % 22 7* 23 7* 27 7*  --    PLATELETS Thousands/uL 276 241 273  --    POTASSIUM mmol/L 4 4 4 3 4 6 4 5   CHLORIDE mmol/L 103 104 105 108   CO2 mmol/L 27 26 25 25   BUN mg/dL 44* 45* 47* 42*   CREATININE mg/dL 2 79* 2 92* 2 94* 2 73*   CALCIUM mg/dL 9 4 9 3 9 5 9 0   PHOSPHORUS mg/dL  --   --   --  3 4       Previous work up: Reviewed CT abdomen pelvis report from 12/12 2 showed stable bladder wall thickening and compatible with known neoplasm     -new right-sided hydronephrosis and stable retroperitoneal lymphadenopathy as well as left pleural effusion and epicardial metastasis    Portions of the record may have been created with voice recognition software  Occasional wrong word or "sound a like" substitutions may have occurred due to the inherent limitations of voice recognition software  Read the chart carefully and recognize, using context, where substitutions have occurred  If you have any questions, please contact the dictating provider

## 2022-12-22 NOTE — RESTORATIVE TECHNICIAN NOTE
Restorative Technician Note      Patient Name: Sommer Jung     Restorative Tech Visit Date: 12/22/22  Note Type: Mobility  Patient Position Upon Consult: Seated edge of bed  Activity Performed: Ambulated  Assistive Device: Roller walker  Patient Position at End of Consult: All needs within reach;  Other (comment) (ambulated to BR; educated to ring call bell when finished)        Jaylan Alberto

## 2022-12-22 NOTE — RESTORATIVE TECHNICIAN NOTE
Restorative Technician Note      Patient Name: Demian Davis     Restorative Tech Visit Date: 12/22/22  Note Type: Mobility  Patient Position Upon Consult: Supine  Activity Performed: Repositioned  Patient Position at End of Consult: Supine;  All needs within reach; Bed/Chair alarm activated      Clinton Carlson

## 2022-12-22 NOTE — PLAN OF CARE
Problem: Nutrition/Hydration-ADULT  Goal: Nutrient/Hydration intake appropriate for improving, restoring or maintaining nutritional needs  Description: Monitor and assess patient's nutrition/hydration status for malnutrition  Collaborate with interdisciplinary team and initiate plan and interventions as ordered  Monitor patient's weight and dietary intake as ordered or per policy  Utilize nutrition screening tool and intervene as necessary  Determine patient's food preferences and provide high-protein, high-caloric foods as appropriate       INTERVENTIONS:  - Monitor oral intake, urinary output, labs, and treatment plans  - Assess nutrition and hydration status and recommend course of action  - Evaluate amount of meals eaten  - Assist patient with eating if necessary   - Allow adequate time for meals  - Recommend/ encourage appropriate diets, oral nutritional supplements, and vitamin/mineral supplements  - Order, calculate, and assess calorie counts as needed  - Recommend, monitor, and adjust tube feedings and TPN/PPN based on assessed needs  - Assess need for intravenous fluids  - Provide specific nutrition/hydration education as appropriate  - Include patient/family/caregiver in decisions related to nutrition  Outcome: Progressing     Problem: MOBILITY - ADULT  Goal: Maintain or return to baseline ADL function  Description: INTERVENTIONS:  -  Assess patient's ability to carry out ADLs; assess patient's baseline for ADL function and identify physical deficits which impact ability to perform ADLs (bathing, care of mouth/teeth, toileting, grooming, dressing, etc )  - Assess/evaluate cause of self-care deficits   - Assess range of motion  - Assess patient's mobility; develop plan if impaired  - Assess patient's need for assistive devices and provide as appropriate  - Encourage maximum independence but intervene and supervise when necessary  - Involve family in performance of ADLs  - Assess for home care needs following discharge   - Consider OT consult to assist with ADL evaluation and planning for discharge  - Provide patient education as appropriate  Outcome: Progressing  Goal: Maintains/Returns to pre admission functional level  Description: INTERVENTIONS:  - Perform BMAT or MOVE assessment daily    - Set and communicate daily mobility goal to care team and patient/family/caregiver  - Collaborate with rehabilitation services on mobility goals if consulted  - Perform Range of Motion  times a day  - Reposition patient every  hours    - Dangle patient  times a day  - Stand patient  times a day  - Ambulate patient  times a day  - Out of bed to chair  times a day   - Out of bed for meals  times a day  - Out of bed for toileting  - Record patient progress and toleration of activity level   Outcome: Progressing     Problem: Potential for Falls  Goal: Patient will remain free of falls  Description: INTERVENTIONS:  - Educate patient/family on patient safety including physical limitations  - Instruct patient to call for assistance with activity   - Consult OT/PT to assist with strengthening/mobility   - Keep Call bell within reach  - Keep bed low and locked with side rails adjusted as appropriate  - Keep care items and personal belongings within reach  - Initiate and maintain comfort rounds  - Make Fall Risk Sign visible to staff  - Offer Toileting every  Hours, in advance of need  - Initiate/Maintain alarm  - Obtain necessary fall risk management equipmen  - Apply yellow socks and bracelet for high fall risk patients  - Consider moving patient to room near nurses station  Outcome: Progressing     Problem: Prexisting or High Potential for Compromised Skin Integrity  Goal: Skin integrity is maintained or improved  Description: INTERVENTIONS:  - Identify patients at risk for skin breakdown  - Assess and monitor skin integrity  - Assess and monitor nutrition and hydration status  - Monitor labs   - Assess for incontinence   - Turn and reposition patient  - Assist with mobility/ambulation  - Relieve pressure over bony prominences  - Avoid friction and shearing  - Provide appropriate hygiene as needed including keeping skin clean and dry  - Evaluate need for skin moisturizer/barrier cream  - Collaborate with interdisciplinary team   - Patient/family teaching  - Consider wound care consult   Outcome: Progressing

## 2022-12-23 ENCOUNTER — TELEPHONE (OUTPATIENT)
Dept: GYNECOLOGIC ONCOLOGY | Facility: CLINIC | Age: 77
End: 2022-12-23

## 2022-12-23 ENCOUNTER — NURSING HOME VISIT (OUTPATIENT)
Dept: GERIATRICS | Facility: OTHER | Age: 77
End: 2022-12-23

## 2022-12-23 ENCOUNTER — TRANSITIONAL CARE MANAGEMENT (OUTPATIENT)
Dept: INTERNAL MEDICINE CLINIC | Facility: CLINIC | Age: 77
End: 2022-12-23

## 2022-12-23 DIAGNOSIS — G89.3 CANCER RELATED PAIN: ICD-10-CM

## 2022-12-23 DIAGNOSIS — Z96.0 RETAINED URETERAL STENT: Chronic | ICD-10-CM

## 2022-12-23 DIAGNOSIS — E87.5 HYPERKALEMIA: ICD-10-CM

## 2022-12-23 DIAGNOSIS — J90 RECURRENT LEFT PLEURAL EFFUSION: ICD-10-CM

## 2022-12-23 DIAGNOSIS — M21.331 RIGHT WRIST DROP: ICD-10-CM

## 2022-12-23 DIAGNOSIS — N30.01 ACUTE CYSTITIS WITH HEMATURIA: ICD-10-CM

## 2022-12-23 DIAGNOSIS — C67.3 MALIGNANT NEOPLASM OF ANTERIOR WALL OF URINARY BLADDER (HCC): Primary | ICD-10-CM

## 2022-12-23 RX ORDER — SODIUM CHLORIDE 9 MG/ML
20 INJECTION, SOLUTION INTRAVENOUS ONCE
Status: CANCELLED | OUTPATIENT
Start: 2023-01-27

## 2022-12-23 NOTE — TELEPHONE ENCOUNTER
Spoke with wife and she asked I call the rehab facility that he was discharged to to discuss care of ASEPT  I called the rehab facility listed in his discharge plan and spoke with the nurse  Instructed not to drain catheter, but should change dressing every 3 days, clean area around tube with alcohol swab and then place sterile gauze and tegaderm over it

## 2022-12-23 NOTE — TELEPHONE ENCOUNTER
Patient's wife called into the office for an update on the patient's treatment plan  Patient is currently in the hospital in which Jarret states that his tube is "dangling"   Wheat can be reached back @ [phone]

## 2022-12-26 PROBLEM — G47.00 INSOMNIA: Chronic | Status: ACTIVE | Noted: 2022-12-26

## 2022-12-26 NOTE — PROGRESS NOTES
[facility]  [facility], [address] [place] [address]  Senior Care SNF List: [facility]  - TCU    NAME: Asher Layton  AGE: 76 y o   SEX: male [id number]    DATE OF ENCOUNTER: 12/26/2022    Assessment and Plan     Hematuria   -in setting of known invasive bladder cancer s/p chemoraditaion on immunotherapy, with bilatateral ureteral obstruction managed with bilateral nephrostomy tubes  -required cont bladder irrigation and wagner during hosp now both completed and pasesed void trial, remains high risk recurrence, cont to monitor for retention    Obstructive uropathy  -bilateral PCN tubes in place, continue routine cares, will require surg exchange q3mo  -maintained on flomax/ditropan  -per [facility] signout on d/c restart Valtessa for management of hyperkalemia, check BMP in one week and follow-up with Urology as scheduled     Recurrent left pleural effusion  -left sided pleural catheter in place   -managed by Thoracic surg, initially drained weekly until 12/13/22 then per o/p Thoracic Surg notes stop drainage for one month and monitor as may consider removal if no longer draining at that time  -currently not requiring drainage, continue local site cares   -op f/u Thoracic Surg as planned     Malignancy related pain  -follows Palliative Care regularly as o/p, cont o/p f/u  -continue home medication regimen, currently Oxycodone 5mg mild to moderate pain and Oxycodone 10mg for severe  -bowel regimen to reduce risk constipation    Insomnia  -short trial Ambien early last month as o/p without benefit, no longer taking, do not restart  -discussed with patient, amenable to trial low dose Traozdone 25mg as may dual tx benefit of both insomnia and depression   -hold parameters for sedation     L shoulder pain  -s/p remote hx fall  -XR right humerus no osseous abn  -continue symptomatic management, consider topical lidocaine patch if not improved with resumption home pain regimen    Deconditioning/debility   -admit to [address] [place], [place] [address] for rehab  -PT/OT eval and treat  -CBC/BMP/TSH in am    All medications and routine orders were reviewed and updated as needed in SNF EMR  Chief Complaint     Seen for admission at [address] [place] [address],[address]  History of Present Illness     Dinesh Perez is seen for admission to [address] [place] [address] following hospitalization at [facility] from 12/12/22-12/22/22 where he was admitted with hematuria and UTI in setting of known invasive bladder cancer s/p chemoraditaion on immunotherapy, with bilatateral ureteral obstruction managed with bilateral nephrostomy tubes  He was found to have obstructive uropathy due to lower tract origin and required catheter placement by Urology with continued bladder irrigation  During his stay he completed antibiotic course and was cleared for d/c to TCU following successful voiding trial  His hospital stay was complicated by development of right wrist drop felt to be due to compressive when evaluated by Neurology and splinting with outpatient follow-up was recommended  At time of evaluation for admission to TCU he reports pain primarily in his left shoulder and diffuse body aches related to his known malignancy for which he follows with Palliative [facility] as o/p for pain management       HISTORY:  Past Medical History:   Diagnosis Date   • Anemia     Last assessed: 9/28/17   • Anxiety    • Arteriosclerotic cardiovascular disease     Last assessed: 9/28/17   • Arthritis    • Bladder cancer ([facility] [address] )     bladder- had BCG treatments   • Chronic kidney disease     Stage IV   • CKD (chronic kidney disease) stage 4, GFR 15-29 ml/min ([facility])    • Colon polyp    • Coronary artery disease     7 stents   • Depression    • Diabetes mellitus ([facility])     IDDM   • GERD (gastroesophageal reflux disease)    • Glaucoma    • Hematuria    • History of fusion of cervical spine    • Hyperlipidemia    • Hypertension    • Insomnia     Last assessed: 11/14/12   • Loss of hearing     has hearing aids but usually does not wear them   • Lung cancer [facility])    • Metastatic cancer ([facility] [address] )    • Other seasonal allergic rhinitis     Last assessed: 2/10/16   • PAD (peripheral artery disease) ([facility])    • Shortness of breath     on exertion   • Spinal stenosis of lumbar region    • Transient cerebral ischemia     No Residual   • Uses walker     w/c for longer distances     Family History   Problem Relation Age of Onset   • Diabetes Mother    • Heart disease Mother    • Other Mother         High blood pressure   • Heart disease Father    • Diabetes Sister    • Other Sister         High blood pressure   • Kidney disease Sister    • Heart disease Brother    • Other Brother         High blood pressure     Social History     Socioeconomic History   • Marital status: /Civil Union     Spouse name: Not on file   • Number of children: Not on file   • Years of education: Not on file   • Highest education level: Not on file   Occupational History   • Not on file   Tobacco Use   • Smoking status: Former     Packs/day: 3 00     Years: 27 00     Pack years: 81 00     Types: Cigarettes     Quit date: 1970     Years since quittin 0   • Smokeless tobacco: Never   Vaping Use   • Vaping Use: Never used   Substance and Sexual Activity   • Alcohol use: Never     Comment: beer / liquor   • Drug use: Not Currently     Types: Marijuana     Comment: quit 2019 had medical marijuana   • Sexual activity: Not Currently   Other Topics Concern   • Not on file   Social History Narrative    Consumes 1 cup of coffee and 1 soda per day     Social Determinants of Health     Financial Resource Strain: Not on file   Food Insecurity: No Food Insecurity   • Worried About Running Out of Food in the Last Year: Never true   • Ran Out of Food in the Last Year: Never true   Transportation Needs: No Transportation Needs   • Lack of Transportation (Medical): No   • Lack of Transportation (Non-Medical):  No   Physical Activity: Not on file   Stress: Not on file Social Connections: Not on file   Intimate Partner Violence: Not on file   Housing Stability: Low Risk    • Unable to Pay for Housing in the Last Year: No   • Number of Places Lived in the Last Year: 1   • Unstable Housing in the Last Year: No     Allergies: Allergies   Allergen Reactions   • Atorvastatin Hives, Itching and Rash   • Simvastatin Rash and Edema     Edema of lower legs   • Statins Hives and Itching   • Insulin Lispro Swelling and Edema     " Lower Legs"   • Other Itching, Rash and Other (See Comments)     "EKG Patches"   "blue EKG patches"     Review of Systems     Review of Systems   Constitutional: Negative  Negative for appetite change, chills and fever  HENT: Negative  Eyes: Negative  Respiratory: Negative  Cardiovascular: Negative  Gastrointestinal: Negative  Genitourinary: Negative for difficulty urinating, dysuria and hematuria  Musculoskeletal: Positive for back pain  L shoulder pain     R wrist splint irritating    Skin: Negative  Neurological: Positive for weakness (R wrist and hand)  Negative for numbness  Hematological: Negative  Psychiatric/Behavioral: Positive for sleep disturbance  All other systems reviewed and are negative  Medications and orders     All medications reviewed and updated in Nursing Home EMR  Objective     Vitals: reviewed in SNF chart     Physical Exam  Vitals and nursing note reviewed  Constitutional:       General: He is not in acute distress  Appearance: Normal appearance  HENT:      Head: Normocephalic and atraumatic  Nose: Nose normal       Mouth/Throat:      Mouth: Mucous membranes are moist    Eyes:      General:         Right eye: No discharge  Left eye: No discharge  Conjunctiva/sclera: Conjunctivae normal    Cardiovascular:      Rate and Rhythm: Normal rate and regular rhythm  Pulmonary:      Breath sounds: No wheezing        Comments: L pleurex cath in place  Abdominal: Palpations: Abdomen is soft  Tenderness: There is no abdominal tenderness  Comments: bilat nephrostomy tubes in place   Musculoskeletal:      Cervical back: Neck supple  Right lower leg: No edema  Left lower leg: No edema  Comments: R wrist splint in place   Skin:     General: Skin is warm and dry  Findings: Erythema:    Neurological:      Mental Status: He is alert and oriented to person, place, and time  Comments: Right wrist drop   Psychiatric:         Mood and Affect: Mood normal          Behavior: Behavior normal        Pertinent Studies: All relevant labs/studies were reviewed please see chart or hospital paperwork for details

## 2022-12-27 ENCOUNTER — NURSING HOME VISIT (OUTPATIENT)
Dept: GERIATRICS | Facility: OTHER | Age: 77
End: 2022-12-27

## 2022-12-27 ENCOUNTER — APPOINTMENT (OUTPATIENT)
Dept: CT IMAGING | Facility: HOSPITAL | Age: 77
End: 2022-12-27

## 2022-12-27 ENCOUNTER — APPOINTMENT (OUTPATIENT)
Dept: PHYSICAL THERAPY | Facility: REHABILITATION | Age: 77
End: 2022-12-27

## 2022-12-27 DIAGNOSIS — R53.1 RIGHT SIDED WEAKNESS: ICD-10-CM

## 2022-12-27 DIAGNOSIS — R29.90 STROKE-LIKE SYMPTOMS: Primary | ICD-10-CM

## 2022-12-27 DIAGNOSIS — I95.1 ORTHOSTATIC HYPOTENSION: ICD-10-CM

## 2022-12-27 DIAGNOSIS — N18.4 STAGE 4 CHRONIC KIDNEY DISEASE (HCC): ICD-10-CM

## 2022-12-27 NOTE — PROGRESS NOTES
[address]  [phone]  [facility] of Service: nursing home place of service: POS [address] Coverage      NAME: Zay Castro  AGE: 76 y o   SEX: male [phone]    DATE OF ENCOUNTER: 12/27/2022    Assessment and Plan     Problem List Items Addressed This Visit        Cardiovascular and Mediastinum    Orthostatic hypotension     Pressure noted to be in the lower side 98/57 and working with physical therapy, dropped to 77/49, patient symptomatic  Improved with lying down and drinking fluids, check blood pressure 145/86  Will hold furosemide  Will give metoprolol 50 mg twice a day instead of 100 mg in the morning  Discontinue oxybutynin  Monitor orthostatic vital signs            Nervous and Auditory    Right sided weakness     Patient with right upper extremity weakness, patient droop, mild weakness right lower extremity  Noted that it started about a week ago  Will order CT head without contrast and follow-up with results  Patient has history of TIA  Continue PT OT as tolerated         Relevant Orders    CT head without contrast       Genitourinary    Chronic kidney disease     Lab Results   Component Value Date    EGFR 17 12/24/2022    EGFR 21 12/23/2022    EGFR 20 12/22/2022    CREATININE 3 15 (H) 12/24/2022    CREATININE 2 71 (H) 12/23/2022    CREATININE 2 79 (H) 12/22/2022     Creatinine slowly trending up  Will hold furosemide  for now  Avoid nephrotoxic medication  Discontinue oxybutynin  Avoid hypotension  Monitor closely            Other    Stroke-like symptoms - Primary     Patient with right upper extremity weakness, patient droop, mild weakness right lower extremity  Noted that it started about a week ago  Will order CT head without contrast and follow-up with results  Patient has history of TIA  Continue PT OT as tolerated                Chief Complaint     Follow up     History of Present Illness     Zay Castro is a 76 y o  male who was seen today for follow up  Patient seen and examined at bedside  Reports right upper extremity weakness, that started about a week ago  Denies pain at the time of encounter  No fever or chills  Per nursing staff patient had an episode of hypotension-orthostatic hypotension earlier today  Patient stated that he was feeling dizzy at that time  Reports intermittent shortness of breath with exertion  Denies chest pain or cough  No abdominal pain nausea vomiting diarrhea or constipation  The following portions of the patient's history were reviewed and updated as appropriate: allergies, current medications, past family history, past medical history, past social history, past surgical history and problem list     Review of Systems     Review of Systems   Constitutional: Negative for chills, fatigue and fever  HENT: Negative for congestion, rhinorrhea and sore throat  Respiratory: Positive for shortness of breath (with exertion)  Negative for cough and wheezing  Cardiovascular: Positive for leg swelling  Negative for chest pain  Gastrointestinal: Negative for abdominal pain, constipation and nausea  Genitourinary: Negative for dysuria and hematuria  Musculoskeletal: Positive for arthralgias and gait problem  Skin: Negative for rash and wound  Allergic/Immunologic: Negative for environmental allergies  Neurological: Positive for weakness  Negative for dizziness and syncope  Hematological: Does not bruise/bleed easily  Psychiatric/Behavioral: Negative for behavioral problems and sleep disturbance         Active Problem List     Patient Active Problem List   Diagnosis   • Coronary artery disease of native artery of native heart with stable angina pectoris ([facility] [address] )   • Bladder carcinoma ([facility] [address] )   • Hypertension with renal disease   • Hyperlipidemia   • Presence of stent in coronary artery   • Type 2 diabetes mellitus, with long-term current use of insulin ([facility] [address] )   • Primary osteoarthritis of left knee   • Cystitis due to intravesical BCG administration   • Degeneration of lumbar intervertebral disc   • Back pain   • Arteriosclerosis of artery of extremity (HCC)   • Stable angina pectoris (HCC)   • Herpes zoster without complication   • Localized edema   • Superficial phlebitis   • Preop examination   • Acute renal failure (ARF) (HCC)   • Secondary renal hyperparathyroidism (HCC)   • 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)   • Abnormal MRI, lumbar spine   • Diabetic polyneuropathy associated with type 2 diabetes mellitus (HCC)   • Dysuria   • Diabetic ulcer of left foot associated with type 2 diabetes mellitus, 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 ([facility] [address] )   • Acute kidney injury superimposed on CKD (HCC)   • Chronic anemia   • Anemia of chronic disease   • Preoperative clearance   • PAD (peripheral artery disease) (HCC)   • Left carotid bruit   • Gross hematuria   • Chronic bronchitis (HCC)   • Moderate major depression, single episode (HCC)   • Thrombocytopenia (HCC)   • 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   • Lumbar spondylosis   • Chronic pain syndrome   • Acute urinary retention   • Hematuria   • Continuous opioid dependence (HCC)   • Port-A-Cath in place   • 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HCC)   • Embolism and 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 (HCC)   • Abnormal CT scan, bladder   • Retained ureteral stent   • Fall   • Hyperkalemia   • Chronic kidney disease   • Bilateral hydronephrosis   • Cancer related pain   • Syncope and collapse   • Depression with suicidal ideation   • Pulmonary nodules   • Retroperitoneal lymphadenopathy   • SOB (shortness of breath)   • History of tobacco use disorder   • Recurrent left pleural effusion   • UTI (urinary tract infection)   • Right wrist drop   • Insomnia   • Right sided weakness   • Stroke-like symptoms   • Orthostatic hypotension       Objective     Vital Signs:     Blood pressure 145/86 Heart Rate: 59 Respiratory Rate 20   Temperature 97 3 Oxygen Saturation 96% Weight 226 3lbs    Physical Exam  Vitals and nursing note reviewed  Constitutional:       General: He is not in acute distress  Appearance: He is not diaphoretic  HENT:      Head: Normocephalic and atraumatic  Mouth/Throat:      Mouth: Mucous membranes are dry  Eyes:      General:         Right eye: No discharge  Left eye: No discharge  Pupils: Pupils are equal, round, and reactive to light  Cardiovascular:      Rate and Rhythm: Normal rate and regular rhythm  Heart sounds: Normal heart sounds  No murmur heard  Pulmonary:      Effort: Pulmonary effort is normal  No respiratory distress  Breath sounds: Normal breath sounds  No wheezing  Abdominal:      Palpations: Abdomen is soft  Tenderness: There is no abdominal tenderness  There is no guarding or rebound  Genitourinary:     Comments: Nephrostomy tubes  Musculoskeletal:         General: Tenderness present  Cervical back: Normal range of motion and neck supple  Right lower leg: Edema (trace) present  Left lower leg: Edema (trace) present  Skin:     General: Skin is warm and dry  Neurological:      Mental Status: He is alert and oriented to person, place, and time  Cranial Nerves: Cranial nerve deficit present  Motor: Weakness (RUE) present  Psychiatric:         Behavior: Behavior normal          Pertinent Laboratory/Diagnostic Studies:  Laboratory and Imaging studies reviewed  Full report in the paper chart  Current Medications   Medications reviewed and updated in facility chart      Name: Meghan Herrera  : 1945  MRN: [id number]        Katie Wilcox MD  Geriatric Medicine  2022 2:18 PM

## 2022-12-27 NOTE — ASSESSMENT & PLAN NOTE
Patient with right upper extremity weakness, patient droop, mild weakness right lower extremity  Noted that it started about a week ago  Will order CT head without contrast and follow-up with results  Patient has history of TIA  Continue PT OT as tolerated

## 2022-12-27 NOTE — ASSESSMENT & PLAN NOTE
Lab Results   Component Value Date    EGFR 17 12/24/2022    EGFR 21 12/23/2022    EGFR 20 12/22/2022    CREATININE 3 15 (H) 12/24/2022    CREATININE 2 71 (H) 12/23/2022    CREATININE 2 79 (H) 12/22/2022     Creatinine slowly trending up  Will hold furosemide  for now  Avoid nephrotoxic medication  Discontinue oxybutynin  Avoid hypotension  Monitor closely

## 2022-12-27 NOTE — ASSESSMENT & PLAN NOTE
Pressure noted to be in the lower side 98/57 and working with physical therapy, dropped to 77/49, patient symptomatic  Improved with lying down and drinking fluids, check blood pressure 145/86  Will hold furosemide  Will give metoprolol 50 mg twice a day instead of 100 mg in the morning  Discontinue oxybutynin  Monitor orthostatic vital signs

## 2022-12-28 ENCOUNTER — NURSING HOME VISIT (OUTPATIENT)
Dept: GERIATRICS | Facility: OTHER | Age: 77
End: 2022-12-28

## 2022-12-28 ENCOUNTER — APPOINTMENT (OUTPATIENT)
Dept: PHYSICAL THERAPY | Facility: REHABILITATION | Age: 77
End: 2022-12-28

## 2022-12-28 DIAGNOSIS — D64.9 SYMPTOMATIC ANEMIA: Primary | ICD-10-CM

## 2022-12-28 DIAGNOSIS — R31.0 GROSS HEMATURIA: ICD-10-CM

## 2022-12-28 DIAGNOSIS — N18.4 STAGE 4 CHRONIC KIDNEY DISEASE (HCC): ICD-10-CM

## 2022-12-28 DIAGNOSIS — R53.1 RIGHT SIDED WEAKNESS: ICD-10-CM

## 2022-12-28 RX ORDER — FUROSEMIDE 10 MG/ML
20 INJECTION INTRAMUSCULAR; INTRAVENOUS ONCE
Status: CANCELLED | OUTPATIENT
Start: 2022-12-29

## 2022-12-28 RX ORDER — SODIUM CHLORIDE 9 MG/ML
20 INJECTION, SOLUTION INTRAVENOUS ONCE
Status: CANCELLED | OUTPATIENT
Start: 2022-12-28

## 2022-12-28 RX ORDER — SODIUM CHLORIDE 9 MG/ML
20 INJECTION, SOLUTION INTRAVENOUS ONCE
Status: CANCELLED | OUTPATIENT
Start: 2022-12-29

## 2022-12-28 RX ORDER — FUROSEMIDE 10 MG/ML
20 INJECTION INTRAMUSCULAR; INTRAVENOUS ONCE
Status: CANCELLED | OUTPATIENT
Start: 2022-12-28

## 2022-12-28 NOTE — ASSESSMENT & PLAN NOTE
Patient continues to have hematuria  Bilateral nephrostomy tubes in place, hematuria noted right side also patient reports that he urinated and noted blood    Continue to monitor CBC  Will need urology follow-up

## 2022-12-28 NOTE — PROGRESS NOTES
[address]  [phone]  [facility] of Service: nursing home place of service: POS [address] Coverage      NAME: Claudio Dunbar  AGE: 76 y o  SEX: male [phone]    DATE OF ENCOUNTER: 12/28/2022    Assessment and Plan     Problem List Items Addressed This Visit        Nervous and Auditory    Right sided weakness     CT head with microangiopathic changes  Continue physical therapy  Continue good blood pressure control  Continue aspirin            Genitourinary    Chronic kidney disease     Lab Results   Component Value Date    EGFR 16 12/28/2022    EGFR 17 12/24/2022    EGFR 21 12/23/2022    CREATININE 3 44 (H) 12/28/2022    CREATININE 3 15 (H) 12/24/2022    CREATININE 2 71 (H) 12/23/2022     Creatinine trending up  Continue to hold furosemide and avoid nephrotoxic medication  If creatinine continues to trend up we will consult nephrology  Avoid hypotension, repeat CMP in the morning            Other    Symptomatic anemia - Primary     Hemoglobin this morning 7 0  Repeat H&H and check type and screen  Repeat hemoglobin was 6 9  Will transfuse 1 unit PRBC  Transfusion will be done in the morning over 4 hours, patient will receive Lasix IV at the end of transfusion  Repeat CBC type and screen in a m  Monitor CBC periodically         Hematuria     Patient continues to have hematuria  Bilateral nephrostomy tubes in place, hematuria noted right side also patient reports that he urinated and noted blood  Continue to monitor CBC  Will need urology follow-up                Chief Complaint     Follow up     History of Present Illness     Claudio Dunbar is a 76 y o  male who was seen today for follow up  Patient seen and examined at bedside, reports that he feels tired  Denies fever chills  Appetite is preserved  No chest pain palpitations  Reports intermittent dizziness especially with standing from sitting position    Ports difficulty sleeping  The time of encounter he denies abdominal pain nausea vomiting diarrhea or constipation        The following portions of the patient's history were reviewed and updated as appropriate: allergies, current medications, past family history, past medical history, past social history, past surgical history and problem list     Review of Systems     Review of Systems   Constitutional: Positive for fatigue  Negative for chills and fever  HENT: Negative for congestion, rhinorrhea and sore throat  Respiratory: Negative for cough, shortness of breath and wheezing  Cardiovascular: Positive for leg swelling  Negative for chest pain  Gastrointestinal: Negative for abdominal pain, constipation and nausea  Genitourinary: Positive for hematuria  Negative for dysuria  Bilateral nephrostomy tubes   Musculoskeletal: Positive for gait problem  Skin: Negative for rash and wound  Allergic/Immunologic: Negative for environmental allergies  Neurological: Negative for dizziness and syncope  Hematological: Does not bruise/bleed easily  Psychiatric/Behavioral: Positive for sleep disturbance  Negative for behavioral problems         Active Problem List     Patient Active Problem List   Diagnosis   • Coronary artery disease of native artery of native heart with stable angina pectoris [facility]   • Bladder carcinoma ([facility] )   • Hypertension with renal disease   • Hyperlipidemia   • Presence of stent in coronary artery   • Type 2 diabetes mellitus, with long-term current use of insulin ([facility])   • Primary osteoarthritis of left knee   • Cystitis due to intravesical BCG administration   • Degeneration of lumbar intervertebral disc   • Back pain   • Arteriosclerosis of artery of extremity ([facility])   • Stable angina pectoris ([facility])   • Herpes zoster without complication   • Localized edema   • Superficial phlebitis   • Preop examination   • Acute renal failure (ARF) ([facility])   • Secondary renal hyperparathyroidism [facility])   • 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)   • Abnormal MRI, lumbar spine   • Diabetic polyneuropathy associated with type 2 diabetes mellitus ([facility] [address] )   • Dysuria   • Diabetic ulcer of left foot associated with type 2 diabetes mellitus, 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 [facility] )   • Acute kidney injury superimposed on CKD ([facility] )   • Symptomatic anemia   • Anemia of chronic disease   • Preoperative clearance   • PAD (peripheral artery disease) ([facility])   • Left carotid bruit   • Gross hematuria   • Chronic bronchitis ([facility])   • Moderate major depression, single episode ([facility])   • Thrombocytopenia ([facility])   • 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   • Lumbar spondylosis   • Chronic pain syndrome   • Acute urinary retention   • Hematuria   • Continuous opioid dependence ([facility] )   • Port-A-Cath in place   • 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[facility] )   • Embolism and 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 ([facility])   • Abnormal CT scan, bladder   • Retained ureteral stent   • Fall   • Hyperkalemia   • Chronic kidney disease   • Bilateral hydronephrosis   • Cancer related pain   • Syncope and collapse   • Depression with suicidal ideation   • Pulmonary nodules   • Retroperitoneal lymphadenopathy   • SOB (shortness of breath)   • History of tobacco use disorder   • Recurrent left pleural effusion   • UTI (urinary tract infection)   • Right wrist drop   • Insomnia   • Right sided weakness   • Stroke-like symptoms   • Orthostatic hypotension       Objective     Vital Signs:     Blood pressure 155/74 Heart Rate: 83 Respiratory Rate 18   Temperature 97 3 Oxygen Saturation 97% Weight 226 3lbs    Physical Exam  Vitals and nursing note reviewed  Constitutional:       General: He is not in acute distress  Appearance: He is not diaphoretic  HENT:      Head: Normocephalic and atraumatic  Ears:      Comments: [place]     Mouth/Throat:      Mouth: Mucous membranes are dry     Eyes:      General: Right eye: No discharge  Left eye: No discharge  Pupils: Pupils are equal, round, and reactive to light  Cardiovascular:      Rate and Rhythm: Normal rate and regular rhythm  Heart sounds: Normal heart sounds  No murmur heard  Pulmonary:      Effort: Pulmonary effort is normal  No respiratory distress  Breath sounds: Normal breath sounds  No wheezing  Abdominal:      Palpations: Abdomen is soft  Tenderness: There is no abdominal tenderness  There is no guarding or rebound  Genitourinary:     Comments: Nephrostomy tube, bloody urine R side  Musculoskeletal:      Cervical back: Normal range of motion and neck supple  Right lower leg: Edema present  Left lower leg: Edema present  Skin:     General: Skin is warm and dry  Coloration: Skin is pale  Neurological:      Mental Status: He is alert and oriented to person, place, and time  Mental status is at baseline  Psychiatric:         Behavior: Behavior normal          Pertinent Laboratory/Diagnostic Studies:  Laboratory and Imaging studies reviewed  Full report in the paper chart  Current Medications   Medications reviewed and updated in facility chart      Name: Bia Paris  : 1945  MRN: [id number]        Silas Purvis MD  Geriatric Medicine  2022 2:52 PM

## 2022-12-28 NOTE — ASSESSMENT & PLAN NOTE
Hemoglobin this morning 7 0  Repeat H&H and check type and screen  Repeat hemoglobin was 6 9  Will transfuse 1 unit PRBC  Transfusion will be done in the morning over 4 hours, patient will receive Lasix IV at the end of transfusion  Repeat CBC type and screen in a m    Monitor CBC periodically

## 2022-12-28 NOTE — ASSESSMENT & PLAN NOTE
Lab Results   Component Value Date    EGFR 16 12/28/2022    EGFR 17 12/24/2022    EGFR 21 12/23/2022    CREATININE 3 44 (H) 12/28/2022    CREATININE 3 15 (H) 12/24/2022    CREATININE 2 71 (H) 12/23/2022     Creatinine trending up  Continue to hold furosemide and avoid nephrotoxic medication  If creatinine continues to trend up we will consult nephrology  Avoid hypotension, repeat CMP in the morning

## 2022-12-28 NOTE — ASSESSMENT & PLAN NOTE
CT head with microangiopathic changes  Continue physical therapy  Continue good blood pressure control  Continue aspirin

## 2022-12-29 ENCOUNTER — TELEPHONE (OUTPATIENT)
Dept: HEMATOLOGY ONCOLOGY | Facility: CLINIC | Age: 77
End: 2022-12-29

## 2022-12-29 ENCOUNTER — HOSPITAL ENCOUNTER (OUTPATIENT)
Dept: INFUSION CENTER | Facility: HOSPITAL | Age: 77
End: 2022-12-29
Attending: INTERNAL MEDICINE

## 2022-12-29 ENCOUNTER — NURSING HOME VISIT (OUTPATIENT)
Dept: GERIATRICS | Facility: OTHER | Age: 77
End: 2022-12-29

## 2022-12-29 ENCOUNTER — TELEPHONE (OUTPATIENT)
Age: 77
End: 2022-12-29

## 2022-12-29 ENCOUNTER — RA CDI HCC (OUTPATIENT)
Dept: OTHER | Facility: HOSPITAL | Age: 77
End: 2022-12-29

## 2022-12-29 VITALS
RESPIRATION RATE: 16 BRPM | DIASTOLIC BLOOD PRESSURE: 68 MMHG | OXYGEN SATURATION: 99 % | TEMPERATURE: 96.5 F | SYSTOLIC BLOOD PRESSURE: 119 MMHG | HEART RATE: 69 BPM

## 2022-12-29 DIAGNOSIS — D64.9 SYMPTOMATIC ANEMIA: Primary | ICD-10-CM

## 2022-12-29 DIAGNOSIS — R31.0 GROSS HEMATURIA: ICD-10-CM

## 2022-12-29 DIAGNOSIS — N18.4 STAGE 4 CHRONIC KIDNEY DISEASE (HCC): ICD-10-CM

## 2022-12-29 RX ORDER — SODIUM CHLORIDE 9 MG/ML
20 INJECTION, SOLUTION INTRAVENOUS ONCE
Status: COMPLETED | OUTPATIENT
Start: 2022-12-29 | End: 2022-12-29

## 2022-12-29 RX ORDER — FUROSEMIDE 10 MG/ML
20 INJECTION INTRAMUSCULAR; INTRAVENOUS ONCE
Status: COMPLETED | OUTPATIENT
Start: 2022-12-29 | End: 2022-12-29

## 2022-12-29 RX ADMIN — FUROSEMIDE 20 MG: 10 INJECTION, SOLUTION INTRAVENOUS at 15:29

## 2022-12-29 RX ADMIN — SODIUM CHLORIDE 20 ML/HR: 0.9 INJECTION, SOLUTION INTRAVENOUS at 10:20

## 2022-12-29 NOTE — ASSESSMENT & PLAN NOTE
Lab Results   Component Value Date    EGFR 15 12/29/2022    EGFR 16 12/28/2022    EGFR 17 12/24/2022    CREATININE 3 62 (H) 12/29/2022    CREATININE 3 44 (H) 12/28/2022    CREATININE 3 15 (H) 12/24/2022     Creatinine slowly trending up  Will repeat CMP tomorrow, if creatinine continues to be elevated will consult nephrology    Avoid nephrotoxic medication and hypotension

## 2022-12-29 NOTE — TELEPHONE ENCOUNTER
Received vm from patient grand-daughter Elizabeth Gomez, "Marquez Menendez  It's Sami Johnston returning your call  Regarding Veronica Gomez, I wanted you to give me a call back if you can  So my phone number is [phone] and I do have a patient from 11:30 to 12:00  So if you can give me a call before then or after 12, that would be good  Thank you   Have a good day "

## 2022-12-29 NOTE — PROGRESS NOTES
[facility]  coding opportunities     [id number]     Chart Reviewed number of suggestions sent to Provider: 1     Patients Insurance     Medicare Insurance: [employer]

## 2022-12-29 NOTE — TELEPHONE ENCOUNTER
Per Dr Celis Shows recommendations, patient to have follow up scheduled for 2 weeks from today  Call out to patient grand-daughter Karen Caruso to assess how patient is doing  Left RN teams number and hours of operation to further discuss

## 2022-12-29 NOTE — ASSESSMENT & PLAN NOTE
Patient received 1 unit PRBC today  Tolerated well  Denies shortness of breath dizziness palpitations    Will repeat CBC CMP in the morning  Transfuse if  hemoglobin less than 7 0

## 2022-12-29 NOTE — PROGRESS NOTES
Pt tolerated treatment well with no complications  Port flushed and blood return noted before de accessing  Pt aware of future appts  Declined AVS  Pt waiting for transport back to the floor  Pt refused getting brief changed at this time and states " I will wait until I'm back in my room  "

## 2022-12-29 NOTE — ASSESSMENT & PLAN NOTE
Continues to have hematuria  Lateral nephrostomy tubes in place hematuria noted on right side tube  Patient also urinates bloody urine    Discussed with urology, patient is not a surgical candidate  Will continue to monitor  Follow-up with urology

## 2022-12-29 NOTE — PROGRESS NOTES
[address]  [phone]  [facility] of Service: nursing home place of service: POS [address] Coverage      NAME: Asher Layton  AGE: 76 y o  SEX: male [id number]    DATE OF ENCOUNTER: 12/29/2022    Assessment and Plan     Problem List Items Addressed This Visit        Genitourinary    Chronic kidney disease     Lab Results   Component Value Date    EGFR 15 12/29/2022    EGFR 16 12/28/2022    EGFR 17 12/24/2022    CREATININE 3 62 (H) 12/29/2022    CREATININE 3 44 (H) 12/28/2022    CREATININE 3 15 (H) 12/24/2022     Creatinine slowly trending up  Will repeat CMP tomorrow, if creatinine continues to be elevated will consult nephrology  Avoid nephrotoxic medication and hypotension            Other    Symptomatic anemia - Primary     Patient received 1 unit PRBC today  Tolerated well  Denies shortness of breath dizziness palpitations  Will repeat CBC CMP in the morning  Transfuse if  hemoglobin less than 7 0         Hematuria     Continues to have hematuria  Lateral nephrostomy tubes in place hematuria noted on right side tube  Patient also urinates bloody urine  Discussed with urology, patient is not a surgical candidate  Will continue to monitor  Follow-up with urology                Chief Complaint     Follow up     History of Present Illness     Asher Layton is a 76 y o  male who was seen today for follow up  Patient seen and examined at bedside, states he feels better today, had a good breakfast   No fever or chills  Reports intermittent dizziness however not today  No abdominal pain dysuria hematuria diarrhea or constipation  Continues to have hematuria            The following portions of the patient's history were reviewed and updated as appropriate: allergies, current medications, past family history, past medical history, past social history, past surgical history and problem list     Review of Systems     Review of Systems   Constitutional: Positive for fatigue  Negative for chills and fever  HENT: Negative for congestion, rhinorrhea and sore throat  Respiratory: Negative for cough, shortness of breath and wheezing  Cardiovascular: Negative for chest pain and leg swelling  Gastrointestinal: Negative for abdominal pain, constipation and nausea  Genitourinary: Positive for hematuria  Negative for dysuria  Musculoskeletal: Positive for gait problem  Skin: Negative for rash and wound  Allergic/Immunologic: Negative for environmental allergies  Neurological: Positive for dizziness (intermittent)  Negative for syncope  Hematological: Does not bruise/bleed easily  Psychiatric/Behavioral: Negative for behavioral problems and sleep disturbance         Active Problem List     Patient Active Problem List   Diagnosis   • Coronary artery disease of native artery of native heart with stable angina pectoris ([facility] )   • Bladder carcinoma ([facility])   • Hypertension with renal disease   • Hyperlipidemia   • Presence of stent in coronary artery   • Type 2 diabetes mellitus, with long-term current use of insulin ([facility])   • Primary osteoarthritis of left knee   • Cystitis due to intravesical BCG administration   • Degeneration of lumbar intervertebral disc   • Back pain   • Arteriosclerosis of artery of extremity ([facility])   • Stable angina pectoris ([facility])   • Herpes zoster without complication   • Localized edema   • Superficial phlebitis   • Preop examination   • Acute renal failure (ARF) ([facility])   • Secondary renal hyperparathyroidism ([facility])   • Malignant neoplasm of anterior wall of urinary bladder ([facility])   • Abnormal MRI, lumbar spine   • Diabetic polyneuropathy associated with type 2 diabetes mellitus ([facility] )   • Dysuria   • Diabetic ulcer of left foot associated with type 2 diabetes mellitus, 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 ([facility] )   • Acute kidney injury superimposed on CKD ([facility] )   • Symptomatic anemia   • Anemia of chronic disease   • Preoperative clearance   • PAD (peripheral artery disease) ([facility])   • Left carotid bruit   • Gross hematuria   • Chronic bronchitis (HCC)   • Moderate major depression, single episode ([facility])   • Thrombocytopenia (HCC)   • 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   • Lumbar spondylosis   • Chronic pain syndrome   • Acute urinary retention   • Hematuria   • Continuous opioid dependence ([facility] )   • Port-A-Cath in place   • 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[facility])   • Embolism and 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 ([facility])   • Abnormal CT scan, bladder   • Retained ureteral stent   • Fall   • Hyperkalemia   • Chronic kidney disease   • Bilateral hydronephrosis   • Cancer related pain   • Syncope and collapse   • Depression with suicidal ideation   • Pulmonary nodules   • Retroperitoneal lymphadenopathy   • SOB (shortness of breath)   • History of tobacco use disorder   • Recurrent left pleural effusion   • UTI (urinary tract infection)   • Right wrist drop   • Insomnia   • Right sided weakness   • Stroke-like symptoms   • Orthostatic hypotension       Objective     Vital Signs:     Blood pressure 148/72 Heart Rate: 84 Respiratory Rate 18   Temperature 97 5 Oxygen Saturation 97% Weight 226 3lbs    Physical Exam  Vitals and nursing note reviewed  Constitutional:       General: He is not in acute distress  Appearance: He is not diaphoretic  Comments: Frail looking   HENT:      Head: Normocephalic and atraumatic  Mouth/Throat:      Mouth: Mucous membranes are dry  Eyes:      General:         Right eye: No discharge  Left eye: No discharge  Pupils: Pupils are equal, round, and reactive to light  Cardiovascular:      Rate and Rhythm: Normal rate and regular rhythm  Heart sounds: Normal heart sounds  No murmur heard  Pulmonary:      Effort: Pulmonary effort is normal  No respiratory distress  Breath sounds: Normal breath sounds  No wheezing     Abdominal: Palpations: Abdomen is soft  Tenderness: There is no abdominal tenderness  There is no guarding or rebound  Genitourinary:     Comments: Urostomy tubes bilateral right side with hematuria  Musculoskeletal:         General: No tenderness  Cervical back: Normal range of motion and neck supple  Right lower leg: Edema (trace) present  Left lower leg: Edema (trace) present  Skin:     General: Skin is warm and dry  Neurological:      Mental Status: He is alert and oriented to person, place, and time  Mental status is at baseline  Psychiatric:         Behavior: Behavior normal          Pertinent Laboratory/Diagnostic Studies:  Laboratory and Imaging studies reviewed  Full report in the paper chart  Current Medications   Medications reviewed and updated in facility chart      Name: Mary Kate Daley  : 1945  MRN: [id number]        Mandy Rodriguez MD  Geriatric Medicine  2022 5:11 PM

## 2022-12-29 NOTE — TELEPHONE ENCOUNTER
Call out to patient grand-daughter Otto Santiago in regards to patient treatment and also office appointments  Patient follow up appointment to be rescheduled per patient and patient grand-daughter once patient is discharged from inpatient physical therapy at [address] [place] [address]  Patient grand-daughter had asked about CT scan from 1/3/22  Patient had some done during ED evaluation and hospitalization  Per Dr Michelet Alvarez scheduled CT scan to be changed to CT of chest only  Patient grand-daughter Otto Santiago, patient is in the inpatient rehab facility and is participating  Patient treatment plan to be kept as scheduled reached out to [address] [place] [address] infusion in regards to patient treatment scheduled for 12/30/22  Call out to [address] [place] [address],[address] in regards to obtaining a phone number for Dr Michelet Alvarez to discuss patient with the provider there  Left a message for staff to call me back  RN Teams number provided  Call out to central scheduling in regards to changing the appointment for CT scan for chest wo contrast and around 1/13/23  Patient CT scan of chest WO contrast scheduled for 1/17/23 at 11am at [address] [place] [address] in addition to cancellation of 1/3/23 CT scan C/A/P WO contrast      Call out to Otto Santiago, reviewed all above  Appreciative of call  Encouraged to call with any further questions and concerns

## 2022-12-29 NOTE — PROGRESS NOTES
Offered to assist Pt to get cleaned up as he is soiled of urine  Pt declined and stated he wanted to wrap in a blanket until he gets back to his room so he can get washed up and change his clothing

## 2022-12-29 NOTE — PROGRESS NOTES
Pt here for 1 unit of PRBC for HGB of 7 drawn on 12/29/22  Pt brought to infusion by tech from [facility] via [facility]  Confirmed that consent is signed and UTD  Paper copy given to this RN from [employer]  Scanned in by [facility] infusion tech  Port accessed with no issue and blood return noted  NSS running @ [facility]  Pt resting comfortably with no further questions or concerns

## 2022-12-29 NOTE — TELEPHONE ENCOUNTER
Received phone call from provider Dr Jayden Pearl, "Sherman, this is Doctor Jayden Pearl  I'm returning the call regarding the patient's name, Binu Reynaga  I heard that somebody wanted to talk to me about the patient  Please give me a call back  My cell phone is [phone]   Thank you "

## 2022-12-30 ENCOUNTER — HOSPITAL ENCOUNTER (OUTPATIENT)
Dept: INFUSION CENTER | Facility: HOSPITAL | Age: 77
Discharge: HOME | End: 2022-12-30
Attending: INTERNAL MEDICINE

## 2022-12-30 ENCOUNTER — APPOINTMENT (OUTPATIENT)
Dept: RADIOLOGY | Facility: HOSPITAL | Age: 77
End: 2022-12-30

## 2022-12-30 ENCOUNTER — TELEPHONE (OUTPATIENT)
Dept: CARDIAC SURGERY | Facility: CLINIC | Age: 77
End: 2022-12-30

## 2022-12-30 ENCOUNTER — NURSING HOME VISIT (OUTPATIENT)
Dept: GERIATRICS | Facility: OTHER | Age: 77
End: 2022-12-30

## 2022-12-30 DIAGNOSIS — R31.0 GROSS HEMATURIA: ICD-10-CM

## 2022-12-30 DIAGNOSIS — D64.9 SYMPTOMATIC ANEMIA: Primary | ICD-10-CM

## 2022-12-30 DIAGNOSIS — N18.4 STAGE 4 CHRONIC KIDNEY DISEASE (HCC): ICD-10-CM

## 2022-12-30 LAB
ABO GROUP BLD BPU: NORMAL
BPU ID: NORMAL
CROSSMATCH: NORMAL
UNIT DISPENSE STATUS: NORMAL
UNIT PRODUCT CODE: NORMAL
UNIT PRODUCT VOLUME: 350 ML
UNIT RH: NORMAL

## 2022-12-30 NOTE — TELEPHONE ENCOUNTER
LVM to please return my call  The last time Dr Ziyad Helms saw the pt he wanted him to empty his drain once a week for 3 weeks and then call and update us on the amount he is draining

## 2022-12-30 NOTE — PROGRESS NOTES
[address] [facility]  [phone]  [facility] of Service: nursing home place of service: POS [address] Coverage      NAME: Chely Mortensen  AGE: 76 y o  SEX: male [id number]    DATE OF ENCOUNTER: 12/30/22    Assessment and Plan     Problem List Items Addressed This Visit        Genitourinary    Gross hematuria     • Bladder cancer status post TURBT and BCG, bilateral nephrostomy tubes  • Following with [facility], not a radical cystectomy candidate due to comorbidities  • Continues to have  Hematuria R nephrostomy tube   • Per Urology this will likely continue to be the case  • Monitor Cbc and transfuse as needed  • Continue oxycodone as needed for pain  • Ongoing follow-up with Urology, Oncology         Chronic kidney disease     Lab Results   Component Value Date    EGFR 15 12/30/2022    EGFR 15 12/29/2022    EGFR 16 12/28/2022    CREATININE 3 53 (H) 12/30/2022    CREATININE 3 62 (H) 12/29/2022    CREATININE 3 44 (H) 12/28/2022     Creatinine stable  Will avoid nephrotoxic medication  Encourage po hydration  Will monitor BMP  Follow up with Nephrology            Other    Symptomatic anemia - Primary     Hb increased to 8 6 after 1 unit PRBC  Will continue to monitor CBC  Transfuse if Hb<7 0  Of note he continues to have hematuria, Urology follows  Chief Complaint     Follow up     History of Present Illness     Chely Mortenesn is a 76 y o  male who was seen today for follow up  Patient seen and examined at bedside, he feels slightly better  Denies fever chills  No shortness of breath cough palpitations  Denies abdominal pain nausea vomiting diarrhea or constipation  Continues to have hematuria  Reports trouble sleeping            The following portions of the patient's history were reviewed and updated as appropriate: allergies, current medications, past family history, past medical history, past social history, past surgical history and problem list     Review of Systems     Review of Systems   Constitutional: Positive for fatigue  Negative for chills and fever  HENT: Negative for congestion, rhinorrhea and sore throat  Respiratory: Negative for cough, shortness of breath and wheezing  Cardiovascular: Positive for leg swelling  Negative for chest pain  Gastrointestinal: Negative for abdominal pain, constipation and nausea  Genitourinary: Positive for hematuria  Negative for dysuria  Nephrostomy tubes b/l, hematuria right   Musculoskeletal: Positive for gait problem  Skin: Negative for rash and wound  Allergic/Immunologic: Negative for environmental allergies  Neurological: Negative for dizziness and syncope  Hematological: Does not bruise/bleed easily  Psychiatric/Behavioral: Positive for sleep disturbance  Negative for behavioral problems         Active Problem List     Patient Active Problem List   Diagnosis   • Coronary artery disease of native artery of native heart with stable angina pectoris ([facility] )   • Bladder carcinoma ([facility])   • Hypertension with renal disease   • Hyperlipidemia   • Presence of stent in coronary artery   • Type 2 diabetes mellitus, with long-term current use of insulin ([facility])   • Primary osteoarthritis of left knee   • Cystitis due to intravesical BCG administration   • Degeneration of lumbar intervertebral disc   • Back pain   • Arteriosclerosis of artery of extremity ([facility])   • Stable angina pectoris ([facility])   • Herpes zoster without complication   • Localized edema   • Superficial phlebitis   • Preop examination   • Acute renal failure (ARF) ([facility])   • Secondary renal hyperparathyroidism ([facility])   • Malignant neoplasm of anterior wall of urinary bladder ([facility])   • Abnormal MRI, lumbar spine   • Diabetic polyneuropathy associated with type 2 diabetes mellitus ([facility] )   • Dysuria   • Diabetic ulcer of left foot associated with type 2 diabetes mellitus, 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 ([facility] )   • Acute kidney injury superimposed on CKD ([facility] )   • Symptomatic anemia   • Anemia of chronic disease   • Preoperative clearance   • PAD (peripheral artery disease) (HCC)   • Left carotid bruit   • Gross hematuria   • Chronic bronchitis (HCC)   • Moderate major depression, single episode (HCC)   • Thrombocytopenia (HCC)   • 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   • Lumbar spondylosis   • Chronic pain syndrome   • Acute urinary retention   • Hematuria   • Continuous opioid dependence ([facility] )   • Port-A-Cath in place   • 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HCC)   • Embolism and 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 (HCC)   • Abnormal CT scan, bladder   • Retained ureteral stent   • Fall   • Hyperkalemia   • Chronic kidney disease   • Bilateral hydronephrosis   • Cancer related pain   • Syncope and collapse   • Depression with suicidal ideation   • Pulmonary nodules   • Retroperitoneal lymphadenopathy   • SOB (shortness of breath)   • History of tobacco use disorder   • Recurrent left pleural effusion   • UTI (urinary tract infection)   • Right wrist drop   • Insomnia   • Right sided weakness   • Stroke-like symptoms   • Orthostatic hypotension       Objective     Vital Signs:     Blood pressure 136/61 Heart Rate: 84 Respiratory Rate 18   Temperature 98 4 Oxygen Saturation 96% Weight 208 3lbs    Physical Exam  Vitals and nursing note reviewed  Constitutional:       General: He is not in acute distress  Appearance: He is not diaphoretic  HENT:      Head: Normocephalic and atraumatic  Mouth/Throat:      Mouth: Mucous membranes are moist    Eyes:      General:         Right eye: No discharge  Left eye: No discharge  Pupils: Pupils are equal, round, and reactive to light  Cardiovascular:      Rate and Rhythm: Normal rate and regular rhythm  Heart sounds: Normal heart sounds  No murmur heard    Pulmonary:      Effort: Pulmonary effort is normal  No respiratory distress  Breath sounds: Normal breath sounds  No wheezing  Comments: Pleurodesis left   Abdominal:      Palpations: Abdomen is soft  Tenderness: There is no abdominal tenderness  There is no guarding or rebound  Genitourinary:     Comments: Nephrostomy tubes b/l   Hematuria noted  Musculoskeletal:         General: No tenderness  Cervical back: Normal range of motion and neck supple  Right lower leg: Edema (trace) present  Left lower leg: Edema (trace) present  Skin:     General: Skin is warm and dry  Neurological:      Mental Status: He is alert and oriented to person, place, and time  Mental status is at baseline  Psychiatric:         Behavior: Behavior normal          Pertinent Laboratory/Diagnostic Studies:  Laboratory and Imaging studies reviewed  Full report in the paper chart  Current Medications   Medications reviewed and updated in facility chart      Name: Geovanna Simmons  : 1945  MRN: [id number]        Jae Loyd MD  Geriatric Medicine  22 2:01 PM

## 2023-01-02 ENCOUNTER — HOSPITAL ENCOUNTER (INPATIENT)
Facility: HOSPITAL | Age: 78
LOS: 1 days | Discharge: NURSING, REHAB OR LONG-TERM CARE | End: 2023-01-04
Attending: EMERGENCY MEDICINE | Admitting: INTERNAL MEDICINE

## 2023-01-02 ENCOUNTER — APPOINTMENT (OUTPATIENT)
Dept: CT IMAGING | Facility: HOSPITAL | Age: 78
End: 2023-01-02

## 2023-01-02 DIAGNOSIS — D64.9 SYMPTOMATIC ANEMIA: ICD-10-CM
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
## 2023-01-02 DIAGNOSIS — C67.9 BLADDER CANCER (HCC): ICD-10-CM

## 2023-01-02 DIAGNOSIS — T83.83XA NEPHROSTOMY TUBE BLEED (HCC): ICD-10-CM

## 2023-01-02 DIAGNOSIS — C34.90 LUNG CANCER (HCC): ICD-10-CM

## 2023-01-02 DIAGNOSIS — R55 SYNCOPE: Primary | ICD-10-CM

## 2023-01-02 DIAGNOSIS — N18.4 STAGE 4 CHRONIC KIDNEY DISEASE (HCC): ICD-10-CM

## 2023-01-02 PROBLEM — R77.8 ELEVATED TROPONIN: Status: ACTIVE | Noted: 2023-01-02

## 2023-01-02 LAB
2HR DELTA HS TROPONIN: 0 NG/L
4HR DELTA HS TROPONIN: -1 NG/L
ABO GROUP BLD: NORMAL
ALBUMIN SERPL BCP-MCNC: 3.2 G/DL (ref 3.5–5)
ALP SERPL-CCNC: 89 U/L (ref 43–122)
ALT SERPL W P-5'-P-CCNC: 26 U/L
ANION GAP SERPL CALCULATED.3IONS-SCNC: 10 MMOL/L (ref 5–14)
APTT PPP: 35 SECONDS (ref 23–37)
AST SERPL W P-5'-P-CCNC: 35 U/L (ref 17–59)
ATRIAL RATE: 74 BPM
BILIRUB SERPL-MCNC: 0.43 MG/DL (ref 0.2–1)
BLD GP AB SCN SERPL QL: NEGATIVE
BUN SERPL-MCNC: 68 MG/DL (ref 5–25)
CALCIUM ALBUM COR SERPL-MCNC: 9.3 MG/DL (ref 8.3–10.1)
CALCIUM SERPL-MCNC: 8.7 MG/DL (ref 8.4–10.2)
CARDIAC TROPONIN I PNL SERPL HS: 28 NG/L
CARDIAC TROPONIN I PNL SERPL HS: 29 NG/L
CARDIAC TROPONIN I PNL SERPL HS: 29 NG/L
CARDIAC TROPONIN I PNL SERPL HS: 30 NG/L (ref 8–18)
CHLORIDE SERPL-SCNC: 102 MMOL/L (ref 96–108)
CO2 SERPL-SCNC: 24 MMOL/L (ref 21–32)
CREAT SERPL-MCNC: 3.66 MG/DL (ref 0.7–1.5)
ERYTHROCYTE [DISTWIDTH] IN BLOOD BY AUTOMATED COUNT: 15.5 % (ref 11.6–15.1)
GFR SERPL CREATININE-BSD FRML MDRD: 14 ML/MIN/1.73SQ M
GLUCOSE SERPL-MCNC: 155 MG/DL (ref 65–140)
GLUCOSE SERPL-MCNC: 75 MG/DL (ref 65–140)
GLUCOSE SERPL-MCNC: 75 MG/DL (ref 70–99)
HCT VFR BLD AUTO: 23.6 % (ref 36.5–49.3)
HGB BLD-MCNC: 7.2 G/DL (ref 12–17)
INR PPP: 1.06 (ref 0.84–1.19)
LACTATE SERPL-SCNC: 1.3 MMOL/L (ref 0.5–2)
MAGNESIUM SERPL-MCNC: 2 MG/DL (ref 1.6–2.3)
MCH RBC QN AUTO: 29.8 PG (ref 26.8–34.3)
MCHC RBC AUTO-ENTMCNC: 30.5 G/DL (ref 31.4–37.4)
MCV RBC AUTO: 98 FL (ref 82–98)
P AXIS: 30 DEGREES
PHOSPHATE SERPL-MCNC: 5.1 MG/DL (ref 2.5–4.8)
PLATELET # BLD AUTO: 209 THOUSANDS/UL (ref 149–390)
PMV BLD AUTO: 10 FL (ref 8.9–12.7)
POTASSIUM SERPL-SCNC: 4.7 MMOL/L (ref 3.5–5.3)
PR INTERVAL: 182 MS
PROT SERPL-MCNC: 6.9 G/DL (ref 6.4–8.4)
PROTHROMBIN TIME: 14.1 SECONDS (ref 11.6–14.5)
QRS AXIS: 5 DEGREES
QRSD INTERVAL: 102 MS
QT INTERVAL: 380 MS
QTC INTERVAL: 421 MS
RBC # BLD AUTO: 2.42 MILLION/UL (ref 3.88–5.62)
RH BLD: POSITIVE
SODIUM SERPL-SCNC: 136 MMOL/L (ref 135–147)
SPECIMEN EXPIRATION DATE: NORMAL
T WAVE AXIS: 67 DEGREES
VENTRICULAR RATE: 74 BPM
WBC # BLD AUTO: 9.14 THOUSAND/UL (ref 4.31–10.16)

## 2023-01-02 RX ORDER — ONDANSETRON 2 MG/ML
4 INJECTION INTRAMUSCULAR; INTRAVENOUS EVERY 6 HOURS PRN
Status: DISCONTINUED | OUTPATIENT
Start: 2023-01-02 | End: 2023-01-04 | Stop reason: HOSPADM

## 2023-01-02 RX ORDER — HEPARIN SODIUM 5000 [USP'U]/ML
5000 INJECTION, SOLUTION INTRAVENOUS; SUBCUTANEOUS EVERY 8 HOURS SCHEDULED
Status: DISCONTINUED | OUTPATIENT
Start: 2023-01-02 | End: 2023-01-02

## 2023-01-02 RX ORDER — SENNOSIDES 8.6 MG
2 TABLET ORAL 2 TIMES DAILY
Status: DISCONTINUED | OUTPATIENT
Start: 2023-01-02 | End: 2023-01-04 | Stop reason: HOSPADM

## 2023-01-02 RX ORDER — MAGNESIUM HYDROXIDE/ALUMINUM HYDROXICE/SIMETHICONE 120; 1200; 1200 MG/30ML; MG/30ML; MG/30ML
30 SUSPENSION ORAL EVERY 6 HOURS PRN
Status: DISCONTINUED | OUTPATIENT
Start: 2023-01-02 | End: 2023-01-04 | Stop reason: HOSPADM

## 2023-01-02 RX ORDER — ARIPIPRAZOLE 2 MG/1
2 TABLET ORAL DAILY
Status: ON HOLD | COMMUNITY

## 2023-01-02 RX ORDER — EZETIMIBE 10 MG/1
10 TABLET ORAL DAILY
Status: DISCONTINUED | OUTPATIENT
Start: 2023-01-02 | End: 2023-01-04 | Stop reason: HOSPADM

## 2023-01-02 RX ORDER — FERROUS SULFATE 325(65) MG
325 TABLET ORAL
Status: DISCONTINUED | OUTPATIENT
Start: 2023-01-03 | End: 2023-01-04 | Stop reason: HOSPADM

## 2023-01-02 RX ORDER — FUROSEMIDE 20 MG/1
20 TABLET ORAL DAILY
Status: DISCONTINUED | OUTPATIENT
Start: 2023-01-02 | End: 2023-01-04 | Stop reason: HOSPADM

## 2023-01-02 RX ORDER — METOPROLOL SUCCINATE 100 MG/1
100 TABLET, EXTENDED RELEASE ORAL DAILY
Status: DISCONTINUED | OUTPATIENT
Start: 2023-01-02 | End: 2023-01-02

## 2023-01-02 RX ORDER — INSULIN GLARGINE 100 [IU]/ML
18 INJECTION, SOLUTION SUBCUTANEOUS
Status: DISCONTINUED | OUTPATIENT
Start: 2023-01-02 | End: 2023-01-04 | Stop reason: HOSPADM

## 2023-01-02 RX ORDER — POLYETHYLENE GLYCOL 3350 17 G/17G
17 POWDER, FOR SOLUTION ORAL DAILY PRN
Status: DISCONTINUED | OUTPATIENT
Start: 2023-01-02 | End: 2023-01-04 | Stop reason: HOSPADM

## 2023-01-02 RX ORDER — ACETAMINOPHEN 325 MG/1
650 TABLET ORAL ONCE
Status: DISCONTINUED | OUTPATIENT
Start: 2023-01-02 | End: 2023-01-04 | Stop reason: HOSPADM

## 2023-01-02 RX ORDER — GABAPENTIN 300 MG/1
300 CAPSULE ORAL
Status: DISCONTINUED | OUTPATIENT
Start: 2023-01-02 | End: 2023-01-04 | Stop reason: HOSPADM

## 2023-01-02 RX ORDER — ASPIRIN 81 MG/1
81 TABLET ORAL DAILY
Status: DISCONTINUED | OUTPATIENT
Start: 2023-01-02 | End: 2023-01-04 | Stop reason: HOSPADM

## 2023-01-02 RX ORDER — INSULIN LISPRO 100 [IU]/ML
5 INJECTION, SOLUTION INTRAVENOUS; SUBCUTANEOUS
Status: DISCONTINUED | OUTPATIENT
Start: 2023-01-02 | End: 2023-01-04 | Stop reason: HOSPADM

## 2023-01-02 RX ORDER — DULOXETIN HYDROCHLORIDE 30 MG/1
30 CAPSULE, DELAYED RELEASE ORAL DAILY
Status: DISCONTINUED | OUTPATIENT
Start: 2023-01-02 | End: 2023-01-04 | Stop reason: HOSPADM

## 2023-01-02 RX ORDER — ACETAMINOPHEN 325 MG/1
650 TABLET ORAL EVERY 6 HOURS PRN
Status: DISCONTINUED | OUTPATIENT
Start: 2023-01-02 | End: 2023-01-04 | Stop reason: HOSPADM

## 2023-01-02 RX ORDER — ARIPIPRAZOLE 2 MG/1
1 TABLET ORAL DAILY
Status: DISCONTINUED | OUTPATIENT
Start: 2023-01-02 | End: 2023-01-04 | Stop reason: HOSPADM

## 2023-01-02 RX ORDER — INSULIN LISPRO 100 [IU]/ML
1-6 INJECTION, SOLUTION INTRAVENOUS; SUBCUTANEOUS
Status: DISCONTINUED | OUTPATIENT
Start: 2023-01-02 | End: 2023-01-04 | Stop reason: HOSPADM

## 2023-01-02 RX ADMIN — ACETAMINOPHEN 650 MG: 325 TABLET ORAL at 21:43

## 2023-01-02 RX ADMIN — BIMATOPROST 1 DROP: 0.1 SOLUTION/ DROPS OPHTHALMIC at 21:44

## 2023-01-02 RX ADMIN — GABAPENTIN 300 MG: 300 CAPSULE ORAL at 21:44

## 2023-01-02 RX ADMIN — SODIUM CHLORIDE 1000 ML: 0.9 INJECTION, SOLUTION INTRAVENOUS at 13:55

## 2023-01-02 RX ADMIN — SENNOSIDES 17.2 MG: 8.6 TABLET, FILM COATED ORAL at 18:00

## 2023-01-02 RX ADMIN — INSULIN GLARGINE 18 UNITS: 100 INJECTION, SOLUTION SUBCUTANEOUS at 21:44

## 2023-01-02 NOTE — H&P
[facility]  H&P- Aryan Mcclelland 1945, 76 y o  male MRN: [id number]  Unit/Bed#: ED 09 Encounter: [id number]  Primary Care Provider: Cynthia Ball MD   Date and time admitted to hospital: 1/2/2023  1:31 PM    * Symptomatic anemia  Assessment & Plan  · RRT was called because patient was briefly unresponsive with hypotension  · BP stable after IV fluid boluses in ED  · ED ordered 2 PRBC transfusion, consent obtained  · Patient might require IV Lasix if he becomes short of breath after transfusion in the setting of elevated BNP  · Follow H&H after transfusion      Elevated troponin  Assessment & Plan  · Likely non MI troponin elevation due to symptomatic anemia   · patient does not have chest pain  · EKG showed no acute ST-T changes  · ED discussed with cardiologist -recommended to trend troponins    Syncope and collapse  Assessment & Plan  · Due to symptomatic anemia and hypotension  · Patient was on Isorbide mononitrate, Lasix 20 mg, Flomax 0 4 mg, metoprolol  · Will hold isosorbide mononitrate, metoprolol and Flomax for now  · BP stable after IV fluid boluses  · Plan to give 2 PRBC transfusion  · CT head & Spine - negative for acute abnormality other than multilevel facet joint disease with multilevel foraminal narrowing     CKD (chronic kidney disease)  Assessment & Plan  Lab Results   Component Value Date    EGFR 14 01/02/2023    EGFR 15 12/30/2022    EGFR 15 12/29/2022    CREATININE 3 66 (H) 01/02/2023    CREATININE 3 53 (H) 12/30/2022    CREATININE 3 62 (H) 12/29/2022     · Creatinine seems to be around his baseline  · Ongoing follow-up with nephrologist in [facility]  · Avoid hypotension, nephrotoxic agents    Malignant neoplasm of anterior wall of urinary bladder ([facility] [address] )  Assessment & Plan  · History of high-grade treatment refractory bladder cancer s/p chemoradiation in 2021, currently on immunotherapy  · Recently and SLB for malignant bilateral ureteral obstruction s/p bilateral CN  · On 12/24/2022 urology follows patient in [facility], commended to maintain bilateral nephrostomy tubes with daily washing and cleansing  Recommended follow-up with Dr Kip Morgan(primary urologist) for repeat cystoscopy, retrograde pyelography, right ureteral stent exchange and possible TURBT/repeat bladder fulguration in approximately 12 weeks  · Urology consult placed -appreciate recommendations      Type 2 diabetes mellitus, with long-term current use of insulin ([address] )  Assessment & Plan  Lab Results   Component Value Date    HGBA1C 7 2 (H) 10/20/2022       No results for input(s): POCGLU in the last 72 hours  Blood Sugar Average: Last 72 hrs:     · Continue home Lantus 18 unit nightly, lispro 5 unit 3 times daily AC  · Diabetic diet, Accu-Cheks    Hypertension with renal disease  Assessment & Plan  · BP borderline low  · Hold isosorbide mononitrate, Flomax, metoprolol for now    VTE Prophylaxis: Pharmacologic VTE Prophylaxis contraindicated due to Due to symptomatic anemia  / sequential compression device   Code Status: Full code  POLST: There is no POLST form on file for this patient (pre-hospital)  Discussion with family: Discussed with sister, niece    Anticipated Length of Stay:  Patient will be admitted on an Observation basis with an anticipated length of stay of less than 2 midnights  Justification for Hospital Stay: Symptomatic anemia    Total Time for Visit, including Counseling / Coordination of Care: 45 minutes  Greater than 50% of this total time spent on direct patient counseling and coordination of care  Chief Complaint:   Briefly unresponsiveness    History of Present Illness:    Francisco Zamora is a 76 y o  male with PMH of high-grade refractory bladder malignancy with hematuria, transfusion dependent, HTN, CKD who had RRT for brief unresponsiveness and hypotension most likely due to symptomatic anemia  Patient was admitted to medical floor for symptomatic anemia    Initial BP was 90s/60s which improved to 100s/60s after IV fluid boluses  In ED, 2 pRBC ordered for transfusion  Patient blood pressure improved after IV fluid boluses  CT head and cervical spine was negative for acute abnormality  Review of Systems:    Review of Systems Patient was seen and examined at bedside  The patient regained consciousness and seems to be lethargic  He denies chest pain, shortness of breath or palpitation  Past Medical and Surgical History:     Past Medical History:   Diagnosis Date   • Anemia     Last assessed: 9/28/17   • Anxiety    • Arteriosclerotic cardiovascular disease     Last assessed: 9/28/17   • Arthritis    • Bladder cancer ([facility] )     bladder- had BCG treatments   • Chronic kidney disease     Stage IV   • CKD (chronic kidney disease) stage 4, GFR 15-29 ml/min ([facility])    • Colon polyp    • Coronary artery disease     7 stents   • Depression    • Diabetes mellitus ([facility])     IDDM   • GERD (gastroesophageal reflux disease)    • Glaucoma    • Hematuria    • History of fusion of cervical spine    • Hyperlipidemia    • Hypertension    • Insomnia     Last assessed: 11/14/12   • Loss of hearing     has hearing aids but usually does not wear them   • Lung cancer ([facility] )    • Metastatic cancer ([facility] )    • Other seasonal allergic rhinitis     Last assessed: 2/10/16   • PAD (peripheral artery disease) ([facility])    • Shortness of breath     on exertion   • Spinal stenosis of lumbar region    • Transient cerebral ischemia     No Residual   • Uses walker     w/c for longer distances       Past Surgical History:   Procedure Laterality Date   • CARDIAC SURGERY      Cath stent placement  Last assessed: 3/9/17  Interventional Catheterization   • CHOLECYSTECTOMY     • COLONOSCOPY     • CYSTOSCOPY      Diagnostic w/biopsy  Ammy Fines  Last assessed: 12/1/14   • CYSTOSCOPY N/A 04/12/2022    Procedure: CYSTOSCOPY  Bladder biopsies  ;  Surgeon: Ella Grullon MD;  Location: [facility] OR;  Service: Urology   • CYSTOSCOPY W/ RETROGRADES Right 03/01/2022    Procedure: CYSTO; stent removal retrograde;  Surgeon: Silvia Briceño MD;  [facility];  Service: Urology   • CYSTOSCOPY W/ URETERAL STENT PLACEMENT Bilateral 10/18/2021    Procedure: bilateral retrogrades, cytology collection;  Surgeon: Silvia Briceño MD;  [facility];  Service: Urology   • CYSTOURETHROSCOPY      w/cautery  Malika Alfred   • FL RETROGRADE PYELOGRAM  10/18/2021   • FL RETROGRADE PYELOGRAM  10/24/2021   • FL RETROGRADE PYELOGRAM  12/14/2022   • GASTRIC BYPASS      For morbid obesity w/Shaji-en-Y   Resolved: 11/17/09   • INCISION AND DRAINAGE OF WOUND Right 02/26/2017    Procedure: INCISION AND DRAINAGE (I&D) EXTREMITY WITH APPLICATION OF GRAFT JACKET;  Surgeon: Hannah Montelongo DPM;  [facility];  Service:    • INCISION AND DRAINAGE OF WOUND Right 04/25/2017    Procedure: INCISION AND DRAINAGE (I&D) EXTREMITY, APPLICATION OF GRAFT;  Surgeon: Hannah Montelongo DPM;  [facility];  Service:    • IR BIOPSY OTHER  07/02/2020   • IR LOWER EXTREMITY ANGIOGRAM  02/08/2021   • IR LOWER EXTREMITY ANGIOGRAM  02/11/2021   • IR NEPHROSTOMY TUBE CHECK/CHANGE/REPOSITION/REINSERTION/UPSIZE  04/28/2022   • IR NEPHROSTOMY TUBE CHECK/CHANGE/REPOSITION/REINSERTION/UPSIZE  05/24/2022   • IR NEPHROSTOMY TUBE CHECK/CHANGE/REPOSITION/REINSERTION/UPSIZE  06/07/2022   • IR NEPHROSTOMY TUBE CHECK/CHANGE/REPOSITION/REINSERTION/UPSIZE  07/28/2022   • IR NEPHROSTOMY TUBE CHECK/CHANGE/REPOSITION/REINSERTION/UPSIZE  11/15/2022   • IR NEPHROSTOMY TUBE PLACEMENT  02/25/2022   • IR PORT PLACEMENT  01/17/2022   • IR THORACENTESIS  09/02/2022   • IR THORACENTESIS  09/14/2022   • IR THORACENTESIS WITH TUBE PLACEMENT Left     october   • IR TUNNELED CENTRAL LINE PLACEMENT  12/24/2020   • JOINT REPLACEMENT      christofer knees replaced   • MI AMPUTATION METATARSAL W/TOE SINGLE Left 12/21/2020    Procedure: RAY RESECTION FOOT;  Surgeon: Jaspreet Cortez DPM; [facility];  Service: Podiatry   • AL AMPUTATION METATARSAL W/TOE SINGLE Left 12/31/2020    Procedure: 5TH MET RESECTION;  Surgeon: Brooks Underwood DPM;  [facility];  Service: Podiatry   • AL CYSTO BLADDER W/URETERAL CATHETERIZATION Right 12/14/2022    Procedure: CYSTOSCOPY WITH RETROGRADE PYELOGRAM and CLOT EVACUATION, RIGHT STENT INSERTION, FULGRATION OF BLEEDING POINTS;  Surgeon: Sobeida Davis MD;  [facility];  Service: Urology   • AL CYSTO W/IRRIG & EVAC MULTPLE OBSTRUCTING CLOTS N/A 02/10/2021    Procedure: CYSTOSCOPY EVACUATION OF CLOTS, fulguration;  Surgeon: Viona Peabody, MD;  [facility];  Service: Urology   • AL CYSTO W/IRRIG & EVAC MULTPLE OBSTRUCTING CLOTS N/A 10/24/2021    Procedure: CYSTOSCOPY EVACUATION OF CLOT, fulguration of bleeding vessels, right ureter stent placement, retrograde pyelogram;  Surgeon: Eliud Arroyo MD;  [facility];  Service: Urology   • AL CYSTO W/REMOVAL OF LESIONS SMALL N/A 11/19/2020    Procedure: CYSTO W/BIOPSIES, transurethral prostate bx;  Surgeon: Negin Ni MD;  [facility];  Service: Urology   • AL CYSTOURETHROSCOPY W/DEST &/RMVL TUMOR LARGE Bilateral 10/18/2021    Procedure: TRANSURETHRAL RESECTION OF BLADDER TUMOR (TURBT);   Surgeon: Dixie Hernandez MD;  [facility];  Service: Urology   • AL CYSTOURETHROSCOPY WITH BIOPSY N/A 08/16/2016    Procedure: Drea Armendariz;  Surgeon: Mikal Riggs MD;  [facility];  Service: Urology   • AL Jessica Keith 3RD+ ORD [address] PEL/TR [facility] Left 02/08/2021    Procedure: LEG angiogram, CO2 w/limited contrast with balloon angioplasty postertior tibial artery;  Surgeon: Herve Newman MD;  [facility];  Service: Vascular   • ROTATOR CUFF REPAIR     • SMALL INTESTINE SURGERY      Surgery Shaji-en-Y   • SPINAL FUSION      lumbar and cervical fusions   • VAC DRESSING APPLICATION Right 69/28/6278    Procedure: APPLICATION VAC DRESSING;  Surgeon: George Claros DPM;  [facility];  Service:    • WOUND DEBRIDEMENT Left 02/16/2021    Procedure: FOOT DEBRIDE, [address] OUT w/graft application;  Surgeon: George Claros DPM;  [facility];  Service: Podiatry       Meds/Allergies:    Prior to Admission medications    Medication Sig Start Date End Date Taking?  Authorizing Provider   Accu-Chek Softclix Lancets lancets Test blood sugar 4 times daily 2/23/22   Shirley Tejada MD   acetaminophen (TYLENOL) 325 mg tablet Take 650 mg by mouth every 6 (six) hours as needed for mild pain      Historical Provider, MD   ARIPiprazole (ABILIFY) 2 mg tablet Take 1 mg by mouth daily    Historical Provider, MD   aspirin (ECOTRIN LOW STRENGTH) 81 mg EC tablet Take 1 tablet (81 mg total) by mouth daily 11/1/21   Shirley Tejada MD   Azelastine HCl 0 15 % SOLN Inhale 1 spray 2 (two) times a day 5/14/20   Shirley Tejada MD   Bimatoprost (LUMIGAN OP) Apply 1 drop to eye daily at bedtime     Historical Provider, MD   calcitriol (ROCALTROL) 0 25 mcg capsule Take 1 capsule (0 25 mcg total) by mouth daily 2/2/22   Shirley Tejada MD   cyanocobalamin (VITAMIN B-12) 1000 MCG tablet Take 1 tablet (1,000 mcg total) by mouth daily 11/30/22 2/28/23  Shirley Tejada MD   DULoxetine (CYMBALTA) 30 mg delayed release capsule TAKE ONE CAPSULE BY MOUTH EVERY DAY 10/10/22   Shirley Tejada MD   ezetimibe (ZETIA) 10 mg tablet Take 1 tablet (10 mg total) by mouth daily 10/31/22   Shirley Tejada MD   Ferrous Sulfate Dried (Feosol) 200 (65 Fe) MG TABS Take 65 mg by mouth daily 2/2/22   Shirley Tejada MD   fluticasone [facility]) 50 mcg/act nasal spray 1 spray into each nostril as needed for allergies 11/19/22   Triny Garnica PA-C   furosemide (LASIX) 20 mg tablet Take 1 tablet (20 mg total) by mouth daily 10/31/22   Shirley Tejada MD   gabapentin (NEURONTIN) 300 mg capsule TAKE ONE CAPSULE BY MOUTH EVERY DAY AT BEDTIME 10/10/22   Daniele Rebecca Lowry MD   insulin lispro (HumaLOG) 100 units/mL injection Inject 5 Units under the skin 3 (three) times a day with meals 12/22/22   Jade eHredia MD   Insulin Pen Needle 31G X 8 MM MISC Inject 3 times a day 5/8/19   Nikhil Becerra MD   isosorbide mononitrate (IMDUR) 30 mg 24 hr tablet TAKE ONE TABLET BY MOUTH EVERY DAY IN THE MORNING 12/5/22   Nikhil Becerra MD   Lantus SoloStar 100 units/mL injection pen 18 units qhs  Patient taking differently: Inject 18 Units under the skin daily at bedtime 6/24/21   Kraig Abel PA-C   loperamide (IMODIUM) 2 mg capsule Take 2 mg by mouth 4 (four) times a day as needed for diarrhea    Historical Provider, MD   metoprolol succinate (TOPROL-XL) 100 mg 24 hr tablet TAKE ONE TABLET BY MOUTH EVERY DAY 11/7/22   Nikhil Becerra MD   multivitamin [facility]) TABS Take 1 tablet by mouth daily  Historical Provider, MD   omeprazole (PriLOSEC) 20 mg delayed release capsule Take 1 capsule (20 mg total) by mouth daily in the early morning PRN 10/4/22   Nikhil Becerra MD   oxyCODONE (ROXICODONE) 5 immediate release tablet Take 1 tablet (5 mg total) by mouth every 6 (six) hours as needed for severe pain or moderate pain Max Daily Amount: 20 mg 12/22/22   Jade Heredia MD   polyethylene glycol (MIRALAX) 17 g packet Take 17 g by mouth daily 9/26/22 April EVANGELINA Miller   senna (SENOKOT) 8 6 MG tablet Take 2 tablets (17 2 mg total) by mouth 2 (two) times a day 9/26/22 April EVANGELINA Miller   tamsulosin [facility]) 0 4 mg Take 1 capsule (0 4 mg total) by mouth daily with dinner 11/16/22   Cy Barnard PA-C   ARIPiprazole (ABILIFY) 2 mg tablet Take 1 tablet (2 mg total) by mouth daily 6/10/22 1/2/23  April EVANGELINA Miller   betamethasone valerate (VALISONE) 0 1 % cream Apply topically 2 (two) times a day 4/28/21 5/18/21  Nikhil Becerra MD     I have reviewed home medications with patient personally  Allergies:    Allergies   Allergen Reactions   • Atorvastatin Hives, Itching and Rash   • Simvastatin Rash and Edema     Edema of lower legs   • Statins Hives and Itching   • Insulin Lispro Swelling and Edema     " Lower Legs"   • Other Itching, Rash and Other (See Comments)     "EKG Patches"   "blue EKG patches"       Social History:     Marital Status: /Civil Union   Substance Use History:   Social History     Substance and Sexual Activity   Alcohol Use Never    Comment: beer / liquor     Social History     Tobacco Use   Smoking Status Former   • Packs/day: 3 00   • Years: 27 00   • Pack years: 81 00   • Types: Cigarettes   • Quit date: 5   • Years since quittin 0   Smokeless Tobacco Never     Social History     Substance and Sexual Activity   Drug Use Not Currently   • Types: Marijuana    Comment: quit 2019 had medical marijuana       Family History:    Family History   Problem Relation Age of Onset   • Diabetes Mother    • Heart disease Mother    • Other Mother         High blood pressure   • Heart disease Father    • Diabetes Sister    • Other Sister         High blood pressure   • Kidney disease Sister    • Heart disease Brother    • Other Brother         High blood pressure       Physical Exam:     Vitals:   Blood Pressure: 107/66 (23 1536)  Pulse: 72 (23 1536)  Temperature: 98 °F (36 7 °C) (23 1523)  Temp Source: Tympanic (23 1523)  Respirations: 18 (23 1536)  SpO2: 98 % (23 1506)    Physical Exam    General: breathing well on room air, no acute distress  HEENT: NC/AT, PERRL, EOM - normal  Neck: Supple  Pulm/Chest: Normal chest wall expansion, clear breath sounds on both side, no wheezing/rhonchi or crackles appreciated  CVS: RRR, normal S1&S2, no murmur appreciated, capillary refill <2s  Abd: soft, non tender, non distended, bowel sounds +, bilateral PCN  MSK: move all 4 extremities spontaneously, trace pedal edema  Skin: warm  CNS: no acute focal neuro deficit      Additional Data:     Lab Results: I have personally reviewed pertinent reports  Results from last 7 days   Lab Units 01/02/23  1340 12/28/22  1228 12/28/22  0727   WBC Thousand/uL 9 14   < > 10 16   HEMOGLOBIN g/dL 7 2*   < > 7 0*   HEMATOCRIT % 23 6*   < > 23 3*   PLATELETS Thousands/uL 209   < > 247   NEUTROS PCT %  --   --  69   LYMPHS PCT %  --   --  16   MONOS PCT %  --   --  7   EOS PCT %  --   --  7*    < > = values in this interval not displayed  Results from last 7 days   Lab Units 01/02/23  1340   SODIUM mmol/L 136   POTASSIUM mmol/L 4 7   CHLORIDE mmol/L 102   CO2 mmol/L 24   BUN mg/dL 68*   CREATININE mg/dL 3 66*   ANION GAP mmol/L 10   CALCIUM mg/dL 8 7   ALBUMIN g/dL 3 2*   TOTAL BILIRUBIN mg/dL 0 43   ALK PHOS U/L 89   ALT U/L 26   AST U/L 35   GLUCOSE RANDOM mg/dL 75     Results from last 7 days   Lab Units 01/02/23  1345   INR  1 06             Results from last 7 days   Lab Units 01/02/23  1340 12/28/22  0727   LACTIC ACID mmol/L 1 3  --    PROCALCITONIN ng/ml  --  0 32*       Imaging: I have personally reviewed pertinent reports  No orders to display       EKG, Pathology, and Other Studies Reviewed on Admission:   · EKG: NSR, no acute ST-T changes    Allscripts / Epic Records Reviewed: Yes     ** Please Note: This note has been constructed using a voice recognition system   **

## 2023-01-02 NOTE — ASSESSMENT & PLAN NOTE
Lab Results   Component Value Date    HGBA1C 7 2 (H) 10/20/2022       No results for input(s): POCGLU in the last 72 hours      Blood Sugar Average: Last 72 hrs:     · Continue home Lantus 18 unit nightly, lispro 5 unit 3 times daily AC  · Diabetic diet, Accu-Cheks

## 2023-01-02 NOTE — ASSESSMENT & PLAN NOTE
· Likely non MI troponin elevation due to symptomatic anemia   · patient does not have chest pain  · EKG showed no acute ST-T changes  · ED discussed with cardiologist -recommended to trend troponins

## 2023-01-02 NOTE — ASSESSMENT & PLAN NOTE
Hb increased to 8 6 after 1 unit PRBC  Will continue to monitor CBC  Transfuse if Hb<7 0  Of note he continues to have hematuria, Urology follows

## 2023-01-02 NOTE — QUICK NOTE
Discussed with urologist - Wilfredo Lazar regarding today RRT event and symptomatic anemia  Patient had recent procedure stent changed and recommendation remains the same with back to OR in about 10 weeks

## 2023-01-02 NOTE — ASSESSMENT & PLAN NOTE
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

## 2023-01-02 NOTE — ASSESSMENT & PLAN NOTE
· RRT was called because patient was briefly unresponsive with hypotension  · BP stable after IV fluid boluses in ED  · ED ordered 2 PRBC transfusion, consent obtained  · Patient might require IV Lasix if he becomes short of breath after transfusion in the setting of elevated BNP  · Follow H&H after transfusion

## 2023-01-02 NOTE — ASSESSMENT & PLAN NOTE
Lab Results   Component Value Date    EGFR 15 12/30/2022    EGFR 15 12/29/2022    EGFR 16 12/28/2022    CREATININE 3 53 (H) 12/30/2022    CREATININE 3 62 (H) 12/29/2022    CREATININE 3 44 (H) 12/28/2022     Creatinine stable  Will avoid nephrotoxic medication  Encourage po hydration  Will monitor BMP    Follow up with Nephrology

## 2023-01-02 NOTE — ASSESSMENT & PLAN NOTE
Lab Results   Component Value Date    EGFR 14 01/02/2023    EGFR 15 12/30/2022    EGFR 15 12/29/2022    CREATININE 3 66 (H) 01/02/2023    CREATININE 3 53 (H) 12/30/2022    CREATININE 3 62 (H) 12/29/2022     · Creatinine seems to be around his baseline  · Ongoing follow-up with nephrologist in [facility]  · Avoid hypotension, nephrotoxic agents

## 2023-01-02 NOTE — PLAN OF CARE
Problem: Potential for Falls  Goal: Patient will remain free of falls  Description: INTERVENTIONS:  - Educate patient/family on patient safety including physical limitations  - Instruct patient to call for assistance with activity   - Consult OT/PT to assist with strengthening/mobility   - Keep Call bell within reach  - Keep bed low and locked with side rails adjusted as appropriate  - Keep care items and personal belongings within reach  - Initiate and maintain comfort rounds  - Make Fall Risk Sign visible to staff  - Apply yellow socks and bracelet for high fall risk patients  - Consider moving patient to room near nurses station  Outcome: Progressing     Problem: Prexisting or High Potential for Compromised Skin Integrity  Goal: Skin integrity is maintained or improved  Description: INTERVENTIONS:  - Identify patients at risk for skin breakdown  - Assess and monitor skin integrity  - Assess and monitor nutrition and hydration status  - Monitor labs   - Assess for incontinence   - Turn and reposition patient  - Assist with mobility/ambulation  - Relieve pressure over bony prominences  - Avoid friction and shearing  - Provide appropriate hygiene as needed including keeping skin clean and dry  - Evaluate need for skin moisturizer/barrier cream  - Collaborate with interdisciplinary team   - Patient/family teaching  - Consider wound care consult   Outcome: Progressing     Problem: Nutrition/Hydration-ADULT  Goal: Nutrient/Hydration intake appropriate for improving, restoring or maintaining nutritional needs  Description: Monitor and assess patient's nutrition/hydration status for malnutrition  Collaborate with interdisciplinary team and initiate plan and interventions as ordered  Monitor patient's weight and dietary intake as ordered or per policy  Utilize nutrition screening tool and intervene as necessary  Determine patient's food preferences and provide high-protein, high-caloric foods as appropriate  INTERVENTIONS:  - Monitor oral intake, urinary output, labs, and treatment plans  - Assess nutrition and hydration status and recommend course of action  - Evaluate amount of meals eaten  - Assist patient with eating if necessary   - Allow adequate time for meals  - Recommend/ encourage appropriate diets, oral nutritional supplements, and vitamin/mineral supplements  - Assess need for intravenous fluids  - Provide specific nutrition/hydration education as appropriate  - Include patient/family/caregiver in decisions related to nutrition  Outcome: Progressing

## 2023-01-02 NOTE — ED PROVIDER NOTES
History  Chief Complaint   Patient presents with   • Fall     Patient found on floor on TCF unit  Positive head strike  Pt hypotensive  Patient is a 64-year-old male who was on the transitional care facility unit, when a rapid response was called after patient fell  Patient states he was sitting in his chair, and then fell forward and hit the ground  Patient denies syncopal event, but is unable to remember falling forward  Patient was found on the floor, and immediately went to a c-collar  Patient does have a history of bladder cancer, as well as lung cancer, chronic kidney disease, as well as numerous other conditions  Patient was initially hypotensive approximately 78/40, and received 1 L of fluid on his way to CT  Patient does have a dual nephrostomy, with a known history of right hematuria from the nephrostomy  Patient recently received a blood transfusion approximately 4 days ago, with anemia at 6 5, denies a headache, patient has no complaints, and specifically denies shortness of breath and chest pain  Meds were given approximately 4 hours prior to syncopal event      History provided by:  Patient   used: No    Fall  Mechanism of injury: fall    Injury location:  Head/neck  Time since incident:  15 minutes  Arrived directly from scene: yes    Associated symptoms: headaches and loss of consciousness    Associated symptoms: no abdominal pain, no blindness, no chest pain, no nausea, no neck pain, no seizures and no vomiting        Prior to Admission Medications   Prescriptions Last Dose Informant Patient Reported? Taking?    ARIPiprazole (ABILIFY) 2 mg tablet   Yes No   Sig: Take 1 mg by mouth daily   Accu-Chek Softclix Lancets lancets   No No   Sig: Test blood sugar 4 times daily   Azelastine HCl 0 15 % SOLN   No No   Sig: Inhale 1 spray 2 (two) times a day   Bimatoprost (LUMIGAN OP)   Yes No   Sig: Apply 1 drop to eye daily at bedtime    DULoxetine (CYMBALTA) 30 mg delayed release capsule   No No   Sig: TAKE ONE CAPSULE BY MOUTH EVERY DAY   Ferrous Sulfate Dried (Feosol) 200 (65 Fe) MG TABS   No No   Sig: Take 65 mg by mouth daily   Insulin Pen Needle 31G X 8 MM MISC   No No   Sig: Inject 3 times a day   Lantus SoloStar 100 units/mL injection pen   No No   Si units qhs   Patient taking differently: Inject 18 Units under the skin daily at bedtime   acetaminophen (TYLENOL) 325 mg tablet   Yes No   Sig: Take 650 mg by mouth every 6 (six) hours as needed for mild pain     aspirin (ECOTRIN LOW STRENGTH) 81 mg EC tablet   No No   Sig: Take 1 tablet (81 mg total) by mouth daily   calcitriol (ROCALTROL) 0 25 mcg capsule   No No   Sig: Take 1 capsule (0 25 mcg total) by mouth daily   cyanocobalamin (VITAMIN B-12) 1000 MCG tablet   No No   Sig: Take 1 tablet (1,000 mcg total) by mouth daily   ezetimibe (ZETIA) 10 mg tablet   No No   Sig: Take 1 tablet (10 mg total) by mouth daily   fluticasone (FLONASE) 50 mcg/act nasal spray   No No   Si spray into each nostril as needed for allergies   furosemide (LASIX) 20 mg tablet   No No   Sig: Take 1 tablet (20 mg total) by mouth daily   gabapentin (NEURONTIN) 300 mg capsule   No No   Sig: TAKE ONE CAPSULE BY MOUTH EVERY DAY AT BEDTIME   insulin lispro (HumaLOG) 100 units/mL injection   No No   Sig: Inject 5 Units under the skin 3 (three) times a day with meals   isosorbide mononitrate (IMDUR) 30 mg 24 hr tablet   No No   Sig: TAKE ONE TABLET BY MOUTH EVERY DAY IN THE MORNING   loperamide (IMODIUM) 2 mg capsule   Yes No   Sig: Take 2 mg by mouth 4 (four) times a day as needed for diarrhea   metoprolol succinate (TOPROL-XL) 100 mg 24 hr tablet   No No   Sig: TAKE ONE TABLET BY MOUTH EVERY DAY   multivitamin (THERAGRAN) TABS   Yes No   Sig: Take 1 tablet by mouth daily     omeprazole (PriLOSEC) 20 mg delayed release capsule   No No   Sig: Take 1 capsule (20 mg total) by mouth daily in the early morning PRN   oxyCODONE (ROXICODONE) 5 immediate release tablet   No No   Sig: Take 1 tablet (5 mg total) by mouth every 6 (six) hours as needed for severe pain or moderate pain Max Daily Amount: 20 mg   polyethylene glycol (MIRALAX) 17 g packet   No No   Sig: Take 17 g by mouth daily   senna (SENOKOT) 8 6 MG tablet   No No   Sig: Take 2 tablets (17 2 mg total) by mouth 2 (two) times a day   tamsulosin (FLOMAX) 0 4 mg   No No   Sig: Take 1 capsule (0 4 mg total) by mouth daily with dinner      Facility-Administered Medications: None       Past Medical History:   Diagnosis Date   • Anemia     Last assessed: 9/28/17   • Anxiety    • Arteriosclerotic cardiovascular disease     Last assessed: 9/28/17   • Arthritis    • Bladder cancer ([facility] )     bladder- had BCG treatments   • Chronic kidney disease     Stage IV   • CKD (chronic kidney disease) stage 4, GFR 15-29 ml/min ([facility])    • Colon polyp    • Coronary artery disease     7 stents   • Depression    • Diabetes mellitus ([facility])     IDDM   • GERD (gastroesophageal reflux disease)    • Glaucoma    • Hematuria    • History of fusion of cervical spine    • Hyperlipidemia    • Hypertension    • Insomnia     Last assessed: 11/14/12   • Loss of hearing     has hearing aids but usually does not wear them   • Lung cancer ([facility] )    • Metastatic cancer ([facility] )    • Other seasonal allergic rhinitis     Last assessed: 2/10/16   • PAD (peripheral artery disease) ([facility])    • Shortness of breath     on exertion   • Spinal stenosis of lumbar region    • Transient cerebral ischemia     No Residual   • Uses walker     w/c for longer distances       Past Surgical History:   Procedure Laterality Date   • CARDIAC SURGERY      Cath stent placement  Last assessed: 3/9/17  Interventional Catheterization   • CHOLECYSTECTOMY     • COLONOSCOPY     • CYSTOSCOPY      Diagnostic w/biopsy  Carmelo Hobson  Last assessed: 12/1/14   • CYSTOSCOPY N/A 04/12/2022    Procedure: CYSTOSCOPY  Bladder biopsies  ;  Surgeon: Mando Ramires MD;  [facility]; Service: Urology   • CYSTOSCOPY W/ RETROGRADES Right 03/01/2022    Procedure: CYSTO; stent removal retrograde;  Surgeon: Alex Pires MD;  [facility];  Service: Urology   • CYSTOSCOPY W/ URETERAL STENT PLACEMENT Bilateral 10/18/2021    Procedure: bilateral retrogrades, cytology collection;  Surgeon: Alex Pires MD;  [facility];  Service: Urology   • CYSTOURETHROSCOPY      w/cautery  Jameson Mcdonald   • FL RETROGRADE PYELOGRAM  10/18/2021   • FL RETROGRADE PYELOGRAM  10/24/2021   • FL RETROGRADE PYELOGRAM  12/14/2022   • GASTRIC BYPASS      For morbid obesity w/Shaji-en-Y   Resolved: 11/17/09   • INCISION AND DRAINAGE OF WOUND Right 02/26/2017    Procedure: INCISION AND DRAINAGE (I&D) EXTREMITY WITH APPLICATION OF GRAFT JACKET;  Surgeon: Cj Alejandre DPM;  [facility];  Service:    • INCISION AND DRAINAGE OF WOUND Right 04/25/2017    Procedure: INCISION AND DRAINAGE (I&D) EXTREMITY, APPLICATION OF GRAFT;  Surgeon: Cj Alejandre DPM;  [facility];  Service:    • IR BIOPSY OTHER  07/02/2020   • IR LOWER EXTREMITY ANGIOGRAM  02/08/2021   • IR LOWER EXTREMITY ANGIOGRAM  02/11/2021   • IR NEPHROSTOMY TUBE CHECK/CHANGE/REPOSITION/REINSERTION/UPSIZE  04/28/2022   • IR NEPHROSTOMY TUBE CHECK/CHANGE/REPOSITION/REINSERTION/UPSIZE  05/24/2022   • IR NEPHROSTOMY TUBE CHECK/CHANGE/REPOSITION/REINSERTION/UPSIZE  06/07/2022   • IR NEPHROSTOMY TUBE CHECK/CHANGE/REPOSITION/REINSERTION/UPSIZE  07/28/2022   • IR NEPHROSTOMY TUBE CHECK/CHANGE/REPOSITION/REINSERTION/UPSIZE  11/15/2022   • IR NEPHROSTOMY TUBE PLACEMENT  02/25/2022   • IR PORT PLACEMENT  01/17/2022   • IR THORACENTESIS  09/02/2022   • IR THORACENTESIS  09/14/2022   • IR THORACENTESIS WITH TUBE PLACEMENT Left     october   • IR TUNNELED CENTRAL LINE PLACEMENT  12/24/2020   • JOINT REPLACEMENT      christofer knees replaced   • ND AMPUTATION METATARSAL W/TOE SINGLE Left 12/21/2020    Procedure: RAY RESECTION FOOT;  Surgeon: [facility];  [facility];  Service: Podiatry   • NE AMPUTATION METATARSAL W/TOE SINGLE Left 12/31/2020    Procedure: 5TH MET RESECTION;  Surgeon: Milagro Powell DPM;  [facility];  Service: Podiatry   • NE CYSTO BLADDER W/URETERAL CATHETERIZATION Right 12/14/2022    Procedure: CYSTOSCOPY WITH RETROGRADE PYELOGRAM and CLOT EVACUATION, RIGHT STENT INSERTION, FULGRATION OF BLEEDING POINTS;  Surgeon: Ashok Goldstein MD;  [facility];  Service: Urology   • NE CYSTO W/IRRIG & EVAC MULTPLE OBSTRUCTING CLOTS N/A 02/10/2021    Procedure: CYSTOSCOPY EVACUATION OF CLOTS, fulguration;  Surgeon: La Dyer MD;  [facility];  Service: Urology   • NE CYSTO W/IRRIG & EVAC MULTPLE OBSTRUCTING CLOTS N/A 10/24/2021    Procedure: CYSTOSCOPY EVACUATION OF CLOT, fulguration of bleeding vessels, right ureter stent placement, retrograde pyelogram;  Surgeon: Zehra Milner MD;  [facility];  Service: Urology   • NE CYSTO W/REMOVAL OF LESIONS SMALL N/A 11/19/2020    Procedure: CYSTO W/BIOPSIES, transurethral prostate bx;  Surgeon: Nithya Mac MD;  [facility];  Service: Urology   • NE CYSTOURETHROSCOPY W/DEST &/RMVL TUMOR LARGE Bilateral 10/18/2021    Procedure: TRANSURETHRAL RESECTION OF BLADDER TUMOR (TURBT);   Surgeon: Flaca Sargent MD;  Location[facility];  Service: Urology   • NE CYSTOURETHROSCOPY WITH BIOPSY N/A 08/16/2016    Procedure: Nazario Minh;  Surgeon: Rosalba Jaime MD;  [facility];  Service: Urology   • NE [place] 3RD+ ORD [address] PEL/TR [facility] Left 02/08/2021    Procedure: LEG angiogram, CO2 w/limited contrast with balloon angioplasty postertior tibial artery;  Surgeon: Raza Bradshaw MD;  [facility];  Service: Vascular   • ROTATOR CUFF REPAIR     • SMALL INTESTINE SURGERY      Surgery Shaji-en-Y   • SPINAL FUSION      lumbar and cervical fusions   • VAC DRESSING APPLICATION Right 78/42/6529    Procedure: APPLICATION VAC DRESSING;  Surgeon: Ophelia Mars DPM;  [facility];  Service:    • WOUND DEBRIDEMENT Left 2021    Procedure: FOOT DEBRIDE, [address] OUT w/graft application;  Surgeon: Ophelia Mars DPM;  [facility];  Service: Podiatry       Family History   Problem Relation Age of Onset   • Diabetes Mother    • Heart disease Mother    • Other Mother         High blood pressure   • Heart disease Father    • Diabetes Sister    • Other Sister         High blood pressure   • Kidney disease Sister    • Heart disease Brother    • Other Brother         High blood pressure     I have reviewed and agree with the history as documented  E-Cigarette/Vaping   • E-Cigarette Use Never User      E-Cigarette/Vaping Substances   • Nicotine No    • THC No    • CBD No    • Flavoring No    • Other No    • Unknown No      Social History     Tobacco Use   • Smoking status: Former     Packs/day: 3 00     Years: 27 00     Pack years: 81 00     Types: Cigarettes     Quit date:      Years since quittin 0   • Smokeless tobacco: Never   Vaping Use   • Vaping Use: Never used   Substance Use Topics   • Alcohol use: Never     Comment: beer / liquor   • Drug use: Not Currently     Types: Marijuana     Comment: quit 2019 had medical marijuana       Review of Systems   Constitutional: Negative for fever  Eyes: Negative for blindness  Respiratory: Negative for chest tightness and shortness of breath  Cardiovascular: Negative for chest pain  Gastrointestinal: Negative for abdominal pain, nausea and vomiting  Musculoskeletal: Negative for neck pain  Skin: Negative for wound  Neurological: Positive for loss of consciousness, syncope and headaches  Negative for seizures  Physical Exam  Physical Exam  Constitutional:       Appearance: Normal appearance  He is normal weight  HENT:      Head: Normocephalic and atraumatic  Eyes:      Pupils: Pupils are equal, round, and reactive to light  Cardiovascular:      Rate and Rhythm: Normal rate  Pulmonary:      Effort: Pulmonary effort is normal    Abdominal:      Palpations: Abdomen is soft  Tenderness: There is no abdominal tenderness  There is no guarding  Musculoskeletal:      Cervical back: Tenderness present  Skin:     General: Skin is warm and dry  Neurological:      Mental Status: He is alert           Vital Signs  ED Triage Vitals   Temperature Pulse Respirations Blood Pressure SpO2   01/02/23 1346 01/02/23 1345 01/02/23 1345 01/02/23 1335 01/02/23 1345   99 °F (37 2 °C) 78 18 110/63 97 %      Temp Source Heart Rate Source Patient Position - Orthostatic VS BP Location FiO2 (%)   01/02/23 1346 01/02/23 1345 01/02/23 1335 01/02/23 1335 --   Oral Monitor Lying Left arm       Pain Score       01/02/23 1642       No Pain           Vitals:    01/02/23 1523 01/02/23 1536 01/02/23 1545 01/02/23 1620   BP: 103/67 107/66 108/58 121/74   Pulse: 71 72 72 74   Patient Position - Orthostatic VS:    Lying         Visual Acuity      ED Medications  Medications   acetaminophen (TYLENOL) tablet 650 mg (650 mg Oral Not Given 1/2/23 1539)   acetaminophen (TYLENOL) tablet 650 mg (has no administration in time range)   polyethylene glycol (MIRALAX) packet 17 g (has no administration in time range)   ondansetron (ZOFRAN) injection 4 mg (has no administration in time range)   aluminum-magnesium hydroxide-simethicone (MYLANTA) oral suspension 30 mL (has no administration in time range)   ARIPiprazole (ABILIFY) tablet 1 mg (has no administration in time range)   aspirin (ECOTRIN LOW STRENGTH) EC tablet 81 mg (has no administration in time range)   bimatoprost (LUMIGAN) 0 01 % ophthalmic solution 1 drop (has no administration in time range)   cyanocobalamin (VITAMIN B-12) tablet 1,000 mcg (has no administration in time range)   ezetimibe (ZETIA) tablet 10 mg (has no administration in time range)   DULoxetine (CYMBALTA) delayed release capsule 30 mg (has no administration in time range)   ferrous sulfate tablet 325 mg (has no administration in time range)   furosemide (LASIX) tablet 20 mg (has no administration in time range)   gabapentin (NEURONTIN) capsule 300 mg (has no administration in time range)   insulin lispro (HumaLOG) 100 units/mL subcutaneous injection 5 Units (has no administration in time range)   insulin glargine (LANTUS) subcutaneous injection 18 Units 0 18 mL (has no administration in time range)   senna (SENOKOT) tablet 17 2 mg (has no administration in time range)   insulin lispro (HumaLOG) 100 units/mL subcutaneous injection 1-6 Units (has no administration in time range)   sodium chloride 0 9 % bolus 1,000 mL (0 mL Intravenous Stopped 1/2/23 1510)       Diagnostic Studies  Results Reviewed     Procedure Component Value Units Date/Time    HS Troponin 0hr (reflex protocol) [[id number]]  (Normal) Collected: 01/02/23 1548    Lab Status: Final result Specimen: Blood from Arm, Right Updated: 01/02/23 1615     hs TnI 0hr 29 ng/L     High Sensitivity Troponin I Random [[id number]]  (Abnormal) Collected: 01/02/23 1345    Lab Status: Final result Specimen: Blood from Arm, Right Updated: 01/02/23 1416     HS TnI random 30 ng/L     Magnesium [[id number]]  (Normal) Collected: 01/02/23 1345    Lab Status: Final result Specimen: Blood from Arm, Left Updated: 01/02/23 1407     Magnesium 2 0 mg/dL     Phosphorus [[id number]]  (Abnormal) Collected: 01/02/23 1345    Lab Status: Final result Specimen: Blood from Arm, Left Updated: 01/02/23 1407     Phosphorus 5 1 mg/dL     Comprehensive metabolic panel [[id number]]  (Abnormal) Collected: 01/02/23 1340    Lab Status: Final result Specimen: Blood from Arm, Left Updated: 01/02/23 1407     Sodium 136 mmol/L      Potassium 4 7 mmol/L      Chloride 102 mmol/L      CO2 24 mmol/L      ANION GAP 10 mmol/L      BUN 68 mg/dL      Creatinine 3 66 mg/dL      Glucose 75 mg/dL      Calcium 8 7 mg/dL      Corrected Calcium 9 3 mg/dL AST 35 U/L      ALT 26 U/L      Alkaline Phosphatase 89 U/L      Total Protein 6 9 g/dL      Albumin 3 2 g/dL      Total Bilirubin 0 43 mg/dL      eGFR 14 ml/min/1 73sq m     Narrative:      [place] guidelines for Chronic Kidney Disease (CKD):   •  Stage 1 with normal or high GFR (GFR > 90 mL/min/1 73 square meters)  •  Stage 2 Mild CKD (GFR = 60-89 mL/min/1 73 square meters)  •  Stage 3A Moderate CKD (GFR = 45-59 mL/min/1 73 square meters)  •  Stage 3B Moderate CKD (GFR = 30-44 mL/min/1 73 square meters)  •  Stage 4 Severe CKD (GFR = 15-29 mL/min/1 73 square meters)  •  Stage 5 End Stage CKD (GFR <15 mL/min/1 73 square meters)  Note: GFR calculation is accurate only with a steady state creatinine    Protime-INR [[id number]]  (Normal) Collected: 01/02/23 1345    Lab Status: Final result Specimen: Blood from Arm, Left Updated: 01/02/23 1405     Protime 14 1 seconds      INR 1 06    APTT [[id number]]  (Normal) Collected: 01/02/23 1345    Lab Status: Final result Specimen: Blood from Arm, Right Updated: 01/02/23 1405     PTT 35 seconds     Lactic acid, plasma [[id number]]  (Normal) Collected: 01/02/23 1340    Lab Status: Final result Specimen: Blood from Arm, Left Updated: 01/02/23 1404     LACTIC ACID 1 3 mmol/L     Narrative:      Result may be elevated if tourniquet was used during collection      CBC [[id number]]  (Abnormal) Collected: 01/02/23 1340    Lab Status: Final result Specimen: Blood from Arm, Left Updated: 01/02/23 1354     WBC 9 14 Thousand/uL      RBC 2 42 Million/uL      Hemoglobin 7 2 g/dL      Hematocrit 23 6 %      MCV 98 fL      MCH 29 8 pg      MCHC 30 5 g/dL      RDW 15 5 %      Platelets 725 Thousands/uL      MPV 10 0 fL                  No orders to display              Procedures  Procedures         ED Course  ED Course as of 01/02/23 1721   Mon Jan 02, 2023   1344 CT head and neck done prior ton arrival in ED   1407 Hemoglobin(!): 7 2  8 6 3 days ago   1414 Right nephrostomy bag half full of gross blood  Left has minimal urine in it   1421 HS TnI random(!): 30   1427 CT head final read:    No acute intracranial hemorrhage seen  No mass effect or midline shift seen   1433 Creatinine(!): 3 66  Baseline   56 Consulted Dr Aletha Reyes, Nephrology, who recommends blood, but no acute nephology need   1450 Per CT cervical neck:    "No acute compression collapse moderate central canal narrowing at C6/7  Multilevel facet joint disease with multilevel foraminal narrowing"   1513 Spoke to Kaylynn Granado, DONYA, cardiology, who recommends trend trop                               SBIRT 22yo+    Flowsheet Row Most Recent Value   SBIRT (25 yo +)    In order to provide better care to our patients, we are screening all of our patients for alcohol and drug use  Would it be okay to ask you these screening questions? Yes Filed at: 01/02/2023 1455   Initial Alcohol Screen: US AUDIT-C     1  How often do you have a drink containing alcohol? 0 Filed at: 01/02/2023 1455   2  How many drinks containing alcohol do you have on a typical day you are drinking? 0 Filed at: 01/02/2023 1455   3a  Male UNDER 65: How often do you have five or more drinks on one occasion? 0 Filed at: 01/02/2023 1455   3b  FEMALE Any Age, or MALE 65+: How often do you have 4 or more drinks on one occassion? 0 Filed at: 01/02/2023 1455   Audit-C Score 0 Filed at: 01/02/2023 1455   MARIELLE: How many times in the past year have you    Used an illegal drug or used a prescription medication for non-medical reasons? Never Filed at: 01/02/2023 1455                    Medical Decision Making  Patient is a 57-year-old male who comes in for evaluation after a syncopal and fall/patient does have extensive past medical history, has been bleeding from his right nephrostomy tube for some time  Patient recently received blood transfusion, and hemoglobin did go up to 8 6, it is currently 7 2    Patient does have an elevated troponin, but EKG shows no sign of ischemia at this time, so suspect that this elevated troponin, is more due to demand ischemia, than ACS  Patient is denying any chest pain  Patient does appear to be pale, does wake up to touch, but does tend to doze off  CT reveals no traumatic injury of the head or neck  I spoke to nephrology, as well as cardiology, and they had no further recommendations at this time  Patient admitted for blood transfusion    Counseling: I had a detailed discussion with the patient and/or guardian regarding: the historical points, exam findings, and any diagnostic results supporting the discharge diagnosis, lab results, radiology results, discharge instructions reviewed with patient and/or family/caregiver and understanding was verbalized  Instructions given to return to the emergency department if symptoms worsen or persist, or if there are any questions or concerns that arise at home       Portions of the record may have been created with voice recognition software   Occasional wrong word or "sound a like" substitutions may have occurred due to the inherent limitations of voice recognition software   Read the chart carefully and recognize, using context, where substitutions have occurred  Bladder cancer [facility]): chronic illness or injury that poses a threat to life or bodily functions  Fall, initial encounter: self-limited or minor problem  Lung cancer [facility]): chronic illness or injury that poses a threat to life or bodily functions  Nephrostomy tube bleed [facility]): chronic illness or injury  Stage 4 chronic kidney disease ([facility] ): chronic illness or injury  Symptomatic anemia: acute illness or injury with systemic symptoms  Syncope: self-limited or minor problem  Amount and/or Complexity of Data Reviewed  External Data Reviewed: labs and radiology  Labs: ordered  Decision-making details documented in ED Course  Radiology: ordered  Decision-making details documented in ED Course    ECG/medicine tests: ordered and independent interpretation performed  Decision-making details documented in ED Course  Disposition  Final diagnoses:   Syncope   Symptomatic anemia   Lung cancer ([facility] )   Bladder cancer ([facility] )   Nephrostomy tube bleed (HCC)   Stage 4 chronic kidney disease ([place] [address] )   Fall, initial encounter     Time reflects when diagnosis was documented in both MDM as applicable and the Disposition within this note     Time User Action Codes Description Comment    1/2/2023  3:18 PM Magdalen Punt Add [R55] Syncope     1/2/2023  3:19 PM Magdalen Punt Add [D64 9] Symptomatic anemia     1/2/2023  3:19 PM Magdalen Punt Add [C34 90] Lung cancer ([facility] )     1/2/2023  3:19 PM Magdalen Punt Add [C67 9] Bladder cancer ([facility] )     1/2/2023  3:19 PM Magdalen Punt Add [T83 83XA] Nephrostomy tube bleed ([place] [address] )     1/2/2023  3:19 PM Magdalen Punt Add [N18 4] Stage 4 chronic kidney disease ([place] [address] )     1/2/2023  3:19 PM Magdalen Punt Add Jaret King, initial encounter       ED Disposition     ED Disposition   Admit    Condition   Stable    Date/Time   Mon Jan 2, 2023  3:18 PM    Comment   Case was discussed with Dr Kim Cook and the patient's admission status was agreed to be Admission Status: observation status to the service of Dr Kim Cook              Follow-up Information    None         Current Discharge Medication List      CONTINUE these medications which have NOT CHANGED    Details   Accu-Chek Softclix Lancets lancets Test blood sugar 4 times daily  Qty: 200 each, Refills: 0    Associated Diagnoses: Type 2 diabetes mellitus with stage 3 chronic kidney disease, with long-term current use of insulin, unspecified whether stage 3a or 3b CKD (HCC)      acetaminophen (TYLENOL) 325 mg tablet Take 650 mg by mouth every 6 (six) hours as needed for mild pain        ARIPiprazole (ABILIFY) 2 mg tablet Take 1 mg by mouth daily      aspirin (ECOTRIN LOW STRENGTH) 81 mg EC tablet Take 1 tablet (81 mg total) by mouth daily    Associated Diagnoses: Coronary artery disease of native artery of native heart with stable angina pectoris (HCC)      Azelastine HCl 0 15 % SOLN Inhale 1 spray 2 (two) times a day  Qty: 30 mL, Refills: 3    Associated Diagnoses: Chronic bronchitis, unspecified chronic bronchitis type (HCC)      Bimatoprost (LUMIGAN OP) Apply 1 drop to eye daily at bedtime       calcitriol (ROCALTROL) 0 25 mcg capsule Take 1 capsule (0 25 mcg total) by mouth daily  Qty: 90 capsule, Refills: 0    Associated Diagnoses: Vitamin D deficiency      cyanocobalamin (VITAMIN B-12) 1000 MCG tablet Take 1 tablet (1,000 mcg total) by mouth daily  Qty: 30 tablet, Refills: 2    Associated Diagnoses: B12 deficiency      DULoxetine (CYMBALTA) 30 mg delayed release capsule TAKE ONE CAPSULE BY MOUTH EVERY DAY  Qty: 90 capsule, Refills: 0    Associated Diagnoses: Degeneration of lumbar intervertebral disc; Lumbar spondylosis; Lumbar radiculopathy      ezetimibe (ZETIA) 10 mg tablet Take 1 tablet (10 mg total) by mouth daily  Qty: 90 tablet, Refills: 1    Associated Diagnoses: Mixed hyperlipidemia      Ferrous Sulfate Dried (Feosol) 200 (65 Fe) MG TABS Take 65 mg by mouth daily  Qty: 90 tablet, Refills: 0    Associated Diagnoses: Iron deficiency anemia, unspecified iron deficiency anemia type      fluticasone (FLONASE) 50 mcg/act nasal spray 1 spray into each nostril as needed for allergies    Associated Diagnoses: Seasonal allergies      furosemide (LASIX) 20 mg tablet Take 1 tablet (20 mg total) by mouth daily  Qty: 90 tablet, Refills: 1    Associated Diagnoses: Coronary artery disease involving native heart without angina pectoris, unspecified vessel or lesion type      gabapentin (NEURONTIN) 300 mg capsule TAKE ONE CAPSULE BY MOUTH EVERY DAY AT BEDTIME  Qty: 90 capsule, Refills: 0    Associated Diagnoses: Type 2 diabetes mellitus with stage 3 chronic kidney disease, with long-term current use of insulin, unspecified whether stage 3a or 3b CKD ([facility])      insulin lispro (HumaLOG) 100 units/mL injection Inject 5 Units under the skin 3 (three) times a day with meals  Refills: 0    Associated Diagnoses: Type 2 diabetes mellitus with chronic kidney disease, with long-term current use of insulin, unspecified CKD stage ([facility])      Insulin Pen Needle 31G X 8 MM MISC Inject 3 times a day  Qty: 100 each, Refills: 2    Associated Diagnoses: Type 2 diabetes mellitus with diabetic peripheral angiopathy without gangrene, with long-term current use of insulin ([facility])      isosorbide mononitrate (IMDUR) 30 mg 24 hr tablet TAKE ONE TABLET BY MOUTH EVERY DAY IN THE MORNING  Qty: 90 tablet, Refills: 0    Associated Diagnoses: Coronary artery disease of native artery of native heart with stable angina pectoris ([facility])      Lantus SoloStar 100 units/mL injection pen 18 units qhs  Qty: 30 mL, Refills: 1    Comments: E11 [id number]  Associated Diagnoses: Type 2 diabetes mellitus with complication, with long-term current use of insulin ([facility])      loperamide (IMODIUM) 2 mg capsule Take 2 mg by mouth 4 (four) times a day as needed for diarrhea      metoprolol succinate (TOPROL-XL) 100 mg 24 hr tablet TAKE ONE TABLET BY MOUTH EVERY DAY  Qty: 90 tablet, Refills: 0    Associated Diagnoses: Left carotid bruit      multivitamin (THERAGRAN) TABS Take 1 tablet by mouth daily        omeprazole (PriLOSEC) 20 mg delayed release capsule Take 1 capsule (20 mg total) by mouth daily in the early morning PRN  Qty: 90 capsule, Refills: 0    Associated Diagnoses: Gastroesophageal reflux disease without esophagitis      oxyCODONE (ROXICODONE) 5 immediate release tablet Take 1 tablet (5 mg total) by mouth every 6 (six) hours as needed for severe pain or moderate pain Max Daily Amount: 20 mg  Qty: 12 tablet, Refills: 0    Associated Diagnoses: Bladder cancer ([facility])      polyethylene glycol (MIRALAX) 17 g packet Take 17 g by mouth daily  Qty: 30 each, Refills: 0    Associated Diagnoses: Therapeutic opioid induced constipation      senna (SENOKOT) 8 6 MG tablet Take 2 tablets (17 2 mg total) by mouth 2 (two) times a day  Qty: 90 tablet, Refills: 0    Associated Diagnoses: Cancer related pain      tamsulosin (FLOMAX) 0 4 mg Take 1 capsule (0 4 mg total) by mouth daily with dinner  Qty: 90 capsule, Refills: 3    Associated Diagnoses: Benign prostatic hyperplasia with weak urinary stream             No discharge procedures on file      PDMP Review       Value Time User    PDMP Reviewed  Yes 11/17/2022  6:15 PM Tiffanie Novak MD          ED Provider  Electronically Signed by           Andres Trammell PA-C  01/02/23 7717

## 2023-01-02 NOTE — ASSESSMENT & PLAN NOTE
· Due to symptomatic anemia and hypotension  · Patient was on Isorbide mononitrate, Lasix 20 mg, Flomax 0 4 mg, metoprolol  · Will hold isosorbide mononitrate, metoprolol and Flomax for now  · BP stable after IV fluid boluses  · Plan to give 2 PRBC transfusion  · CT head & Spine - negative for acute abnormality other than multilevel facet joint disease with multilevel foraminal narrowing

## 2023-01-02 NOTE — RAPID RESPONSE
Rapid Response Note  Alvarez Diallo 76 y o  male MRN: [id number]  Unit/Bed#: ED 09 Encounter: [id number]    Rapid Response Notification(s):   Response called date/time:  1/2/2023 1:07 PM  Response team arrival date/time:  1/2/2023 1:09 PM  [facility]  Rapid response location:  Skilled nursing  Primary reason for rapid response call:  Acute change in BP and fall    Rapid Response Intervention(s):   Airway:  Positioning  Breathing:  None  Circulation:  None  Fluids administered:  Normal saline  Medications administered:  None  Comments:  Placed patient on c-collar  Assessment:    76year-old past medical history notable for malignant neoplasm status post nephrostomy tubes now transfusion dependent history also notable for hypertension was found down today after a fall from his chair  Brain bleed secondary to the fall cannot be ruled out at this time  Other consideration for this hypotension can be anemia in the setting of hypertensive meds  Other consideration can be low blood perfusion secondary to low blood count  #Hypotension  #Status post fall  #Anemia secondary to chronic blood loss from nephrostomy tubes    Plan:   · CT head and neck Noncon  · CBC, CMP, mag, Phos, troponin, lactic  · EKG    Ultimate admission to hospital bed for observation  At minimum, patient needs med reconciliation currently on metoprolol 100 mg daily, Flomax daily, Lasix 20 mg daily  Other consideration patient may need to get transfused last transfusion 12/30  Rapid Response Outcome:   Transfer:  Other (comment)  Code Status: Level 1 (Full Code)    Comments:  Transferred to the emergency department    Family notified of transfer: yes  Family member contacted: Sister was in the room along with niece  Long discussion was had in regards to patient's prognosis  Family unaware of how serious the nature of his cancer is  No goals of discussion was had surrounding this circumstantial situation    Jose Rafael Trent is the main contact who is the granddaughter  She is not in at the time of the rapid response  Encouraged family to have goals of care discussion regarding prognosis with the patient  In the meantime informed the patient's family that he will be admitted  Background/Situation:   Alena De Leon is a 76 y o  male past medical history notable for bladder cancer status post bilateral nephrostomy tube, transfusion dependent secondary to continuous bleeding from his nephrostomy tube, last blood transfusion 12/30 history also notable for CKD, hypertension on metoprolo and Lasix l, history also notable for 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 with reemergence status post chemoradiation who was found down and unresponsive in his room today  Sister who is in the room states that patient started falling over as he was sleeping and subsequently fell down and hit the floor  Sister was unable to get the patient up and thereby requested immediate assistance from the nurse  Upon arrival, patient was found down and unable to get up  He was mentating appropriately ANO x4, however, complete neuro exam was deferred as patient is deconditioned  Pupils were round and reactive to light  Immediate set of vitals found blood glucose to be 80  Initial set of vitals showed blood pressure 90/60 pulse 80 SPO2 greater than 90  Patient had brief loss of consciousness however recovered quickly  Patient endorses headache and neck pain  He denies any chest pain or shortness of breath  Patient denies any fevers or chills  Decision was made at this time to send patient to CAT scan for further evaluation to rule out brain bleed  Review of Systems   HENT: Negative for congestion and dental problem  Respiratory: Negative for chest tightness and shortness of breath  Cardiovascular: Negative for chest pain and leg swelling  Gastrointestinal: Negative for abdominal distention and abdominal pain     Musculoskeletal: Positive for neck pain  Neurological: Positive for dizziness, syncope, weakness, light-headedness and headaches  Negative for seizures, facial asymmetry, speech difficulty and numbness  Objective:   Vitals:    01/02/23 1500 01/02/23 1506 01/02/23 1523 01/02/23 1536   BP: 99/57 108/62 103/67 107/66   BP Location: Left arm Left arm     Pulse: 75 75 71 72   Resp: 18 18 18 18   Temp:  98 5 °F (36 9 °C) 98 °F (36 7 °C)    TempSrc:  Oral Tympanic    SpO2: 97% 98%       Physical Exam  Constitutional:       Appearance: He is obese  Comments: Slow to responding to questions  HENT:      Head: Normocephalic  Comments: Mild bump on the anterior aspect of his forehead     Nose: No congestion or rhinorrhea  Eyes:      Extraocular Movements: Extraocular movements intact  Pupils: Pupils are equal, round, and reactive to light  Pulmonary:      Effort: Pulmonary effort is normal       Breath sounds: Normal breath sounds  Abdominal:      Palpations: Abdomen is soft  Skin:     Capillary Refill: Capillary refill takes less than 2 seconds  Neurological:      Mental Status: He is alert and oriented to person, place, and time  Motor: Weakness (Bilateral extremity weakness,global) present  Psychiatric:         Mood and Affect: Mood normal          Behavior: Behavior normal          Portions of the record may have been created with voice recognition software  Occasional wrong word or "sound a like" substitutions may have occurred due to the inherent limitations of voice recognition software  Read the chart carefully and recognize, using context, where substitutions have occurred      Delfina Ground, DO

## 2023-01-02 NOTE — ASSESSMENT & PLAN NOTE
• Bladder cancer status post TURBT and BCG, bilateral nephrostomy tubes  • Following with [facility], not a radical cystectomy candidate due to comorbidities  • Continues to have  Hematuria R nephrostomy tube   • Per Urology this will likely continue to be the case    • Monitor Cbc and transfuse as needed  • Continue oxycodone as needed for pain  • Ongoing follow-up with Urology, Oncology

## 2023-01-03 ENCOUNTER — APPOINTMENT (OUTPATIENT)
Dept: RADIOLOGY | Facility: HOSPITAL | Age: 78
End: 2023-01-03

## 2023-01-03 PROBLEM — J10.1 INFLUENZA A: Status: ACTIVE | Noted: 2023-01-03

## 2023-01-03 LAB
ABO GROUP BLD BPU: NORMAL
ABO GROUP BLD BPU: NORMAL
BPU ID: NORMAL
BPU ID: NORMAL
CROSSMATCH: NORMAL
CROSSMATCH: NORMAL
FLUAV RNA RESP QL NAA+PROBE: POSITIVE
FLUBV RNA RESP QL NAA+PROBE: NEGATIVE
GLUCOSE SERPL-MCNC: 108 MG/DL (ref 65–140)
GLUCOSE SERPL-MCNC: 203 MG/DL (ref 65–140)
GLUCOSE SERPL-MCNC: 207 MG/DL (ref 65–140)
GLUCOSE SERPL-MCNC: 257 MG/DL (ref 65–140)
HCT VFR BLD AUTO: 24.5 % (ref 36.5–49.3)
HCT VFR BLD AUTO: 27.8 % (ref 36.5–49.3)
HGB BLD-MCNC: 7.7 G/DL (ref 12–17)
HGB BLD-MCNC: 8.7 G/DL (ref 12–17)
RSV RNA RESP QL NAA+PROBE: NEGATIVE
SARS-COV-2 RNA RESP QL NAA+PROBE: NEGATIVE
UNIT DISPENSE STATUS: NORMAL
UNIT DISPENSE STATUS: NORMAL
UNIT PRODUCT CODE: NORMAL
UNIT PRODUCT CODE: NORMAL
UNIT PRODUCT VOLUME: 300 ML
UNIT PRODUCT VOLUME: 300 ML
UNIT RH: NORMAL
UNIT RH: NORMAL

## 2023-01-03 RX ADMIN — ACETAMINOPHEN 650 MG: 325 TABLET ORAL at 22:05

## 2023-01-03 RX ADMIN — FERROUS SULFATE TAB 325 MG (65 MG ELEMENTAL FE) 325 MG: 325 (65 FE) TAB at 09:54

## 2023-01-03 RX ADMIN — INSULIN LISPRO 2 UNITS: 100 INJECTION, SOLUTION INTRAVENOUS; SUBCUTANEOUS at 13:04

## 2023-01-03 RX ADMIN — CYANOCOBALAMIN TAB 1000 MCG 1000 MCG: 1000 TAB at 09:43

## 2023-01-03 RX ADMIN — EZETIMIBE 10 MG: 10 TABLET ORAL at 09:44

## 2023-01-03 RX ADMIN — ARIPIPRAZOLE 1 MG: 2 TABLET ORAL at 09:43

## 2023-01-03 RX ADMIN — INSULIN LISPRO 2 UNITS: 100 INJECTION, SOLUTION INTRAVENOUS; SUBCUTANEOUS at 09:55

## 2023-01-03 RX ADMIN — FUROSEMIDE 20 MG: 20 TABLET ORAL at 09:44

## 2023-01-03 RX ADMIN — ACETAMINOPHEN 650 MG: 325 TABLET ORAL at 10:23

## 2023-01-03 RX ADMIN — ASPIRIN 81 MG: 81 TABLET, COATED ORAL at 09:43

## 2023-01-03 RX ADMIN — INSULIN LISPRO 5 UNITS: 100 INJECTION, SOLUTION INTRAVENOUS; SUBCUTANEOUS at 09:54

## 2023-01-03 RX ADMIN — INSULIN GLARGINE 18 UNITS: 100 INJECTION, SOLUTION SUBCUTANEOUS at 21:52

## 2023-01-03 RX ADMIN — BIMATOPROST 1 DROP: 0.1 SOLUTION/ DROPS OPHTHALMIC at 21:52

## 2023-01-03 RX ADMIN — INSULIN LISPRO 5 UNITS: 100 INJECTION, SOLUTION INTRAVENOUS; SUBCUTANEOUS at 13:04

## 2023-01-03 RX ADMIN — DULOXETINE HYDROCHLORIDE 30 MG: 30 CAPSULE, DELAYED RELEASE ORAL at 09:44

## 2023-01-03 RX ADMIN — GABAPENTIN 300 MG: 300 CAPSULE ORAL at 21:52

## 2023-01-03 NOTE — PROGRESS NOTES
[facility]  Progress Note - Roxi Ramos 1945, 76 y o  male MRN: [id number]  Unit/Bed#: 7T [facility] 710-01 Encounter: [id number]  Primary Care Provider: Janna Teran MD   Date and time admitted to hospital: 1/2/2023  1:31 PM    * Symptomatic anemia  Assessment & Plan  · RRT was called because patient was briefly unresponsive with hypotension  · BP stable after IV fluid boluses in ED  · Hb improved to 8 7 after 2 pRBC transfusion (baseline hemoglobin has been around 7)  · Follow-up with urologist for ongoing hematuria and bladder cancer treatment  · Patient is medically cleared to go back to [facility]     Influenza A  Assessment & Plan  · Incidental finding of influenza A on screening  · patient is asymptomatic with afebrile over the last 24 hours, SPO2 99% on room air   · Patient has no major complaints, continue supportive care  · If patient becomes symptomatic, would benefit from oral Tamiflu course    Elevated troponin  Assessment & Plan  · Likely non MI troponin elevation due to symptomatic anemia, troponin flat  · patient does not have chest pain  · EKG showed no acute ST-T changes  · ED discussed with cardiologist     Syncope and collapse  Assessment & Plan  · Due to symptomatic anemia and hypotension  · Patient was on Isorbide mononitrate, Lasix 20 mg, Flomax 0 4 mg, metoprolol  · Will hold isosorbide mononitrate, metoprolol and Flomax for now  · BP is 118/67 this morning, stable over last 24 hours  · CT head & Spine - negative for acute abnormality other than multilevel facet joint disease with multilevel foraminal narrowing  · Left ankle x-ray showed no acute osseous abnormality       CKD (chronic kidney disease)  Assessment & Plan  Lab Results   Component Value Date    EGFR 14 01/02/2023    EGFR 15 12/30/2022    EGFR 15 12/29/2022    CREATININE 3 66 (H) 01/02/2023    CREATININE 3 53 (H) 12/30/2022    CREATININE 3 62 (H) 12/29/2022     · Creatinine seems to be around his History and Physical//consult    Patient: Feliz Hummel MRN: [id number]  SSN: [id number]    YOB: 1967  Age: 60 y.o. Sex: female      [address],     C/C : Medical consult for shortness of breath    Subjective:      Feliz Hummel is a 60 y.o. female with past medical history of rectal CA, now s/p diverting colostomy secondary to development of perforation, blood cultures drawn in the ED are negative so far. Patient's recovery was as expected however today she developed shortness of breath no fever chills rigors chest pain. Chest x-ray ordered by surgery team suggested edema versus pneumonia. Patient is very sleepy , no distress , speaks very little. Medicine will follow-up for this issue and other medical issues    Past Medical History:   Diagnosis Date    Asthma     Cancer ([facility] [address]     rectal    Chronic pain syndrome     CRPS (complex regional pain syndrome)     History of nonunion of fracture     Migraine      Past Surgical History:   Procedure Laterality Date    COLONOSCOPY N/A 8/3/2022    Flexible Sigmoidoscopy/ Biopsies/ Polypectomy/ Ink Tattoo performed by Agapito Soriano MD at [facility]    HX APPENDECTOMY  2021    [address] [place] [address] Rd      right hand    HX HYSTERECTOMY      HX ORTHOPAEDIC      surgery to right 5th digit - pins placed. Family History   Problem Relation Age of Onset    Hypertension Mother     Diabetes Mother     Diabetes Father     Hypertension Father      Social History     Tobacco Use    Smoking status: Every Day     Packs/day: 1.00     Types: Cigarettes    Smokeless tobacco: Never   Substance Use Topics    Alcohol use: Yes     Alcohol/week: 5.0 standard drinks     Types: 5 Cans of beer per week      Prior to Admission medications    Medication Sig Start Date End Date Taking?  Authorizing Provider   prochlorperazine (COMPAZINE) 10 mg tablet  10/7/22   Provider, Historical   ondansetron hcl (ZOFRAN) 8 mg tablet TAKE 1 TABLET BY MOUTH EVERY 8 HOURS AS NEEDED FOR NAUSEA OR VOMITING 9/14/22   Provider, Historical   loperamide (IMODIUM) 2 mg capsule Take 4 mg by mouth after first loose stool, then 2 mg every 2 hours until diarrhea free for 12 hours. 9/14/22   Provider, Historical   lidocaine-prilocaine (EMLA) topical cream Apply cream generously over port site and cover with plastic, at least 1 hour prior to each treatment appointment 9/14/22   Provider, Historical   dexAMETHasone (DECADRON) 4 mg tablet TAKE 2 TABLETS ( 8 MG ) BY MOUTH DAILY FOR 2 DAYS ( ON DAYS 2 AND 3 OF EACH CYCLE ) 9/14/22   Provider, Historical   HYDROcodone-acetaminophen (XODOL) 5-300 mg tablet Take 1 Tablet by mouth every four (4) hours as needed for Pain for up to 7 days. Max Daily Amount: 6 Tablets. 10/13/22 10/20/22  Olamide Zazueta MD   LORazepam (ATIVAN) 0.5 mg tablet Take 0.5 mg by mouth every six (6) hours as needed for Anxiety. Provider, Historical   acetaminophen (TYLENOL) 500 mg tablet Take 500 mg by mouth every six (6) hours as needed for Pain. Provider, Historical   ubrogepant (UBRELVY) 100 mg tablet Take 100 mg by mouth once as needed for Migraine. Provider, Historical   topiramate (TOPAMAX) 50 mg tablet Take  by mouth two (2) times a day. Provider, Historical        No Known Allergies        Review of Systems:  positive responses in bold type   Constitutional: Negative for fever, chills, diaphoresis and unexpected weight change. HENT: Negative for ear pain, congestion, sore throat, rhinorrhea, drooling, trouble swallowing, neck pain and tinnitus. Eyes: Negative for photophobia, pain, redness and visual disturbance. Respiratory: Shortness of breath for shortness of breath, cough, choking, chest tightness, wheezing or stridor. Cardiovascular: Negative for chest pain, palpitations and leg swelling. Gastrointestinal: Negative for nausea, vomiting, abdominal pain, diarrhea, constipation, blood in stool, abdominal distention and anal bleeding. baseline  · Ongoing follow-up with nephrologist in [facility]  · Avoid hypotension, nephrotoxic agents    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)  Assessment & Plan  · History of high-grade treatment refractory bladder cancer s/p chemoradiation in 2021, currently on immunotherapy  · Recently and SLB for malignant bilateral ureteral obstruction s/p bilateral CN  · On 12/24/2022 urology follows patient in [facility], commended to maintain bilateral nephrostomy tubes with daily washing and cleansing  Recommended follow-up with Dr Ismael Morgan(primary urologist) for repeat cystoscopy, retrograde pyelography, right ureteral stent exchange and possible TURBT/repeat bladder fulguration in approximately 12 weeks  · Discussed with urologist - Luci Rush regarding RRT event and symptomatic anemia  Patient had recent procedure stent changed and recommendation remains the same with back to OR in about 10 weeks  · Discussed about Code status during rapid response  He remains full code  Type 2 diabetes mellitus, with long-term current use of insulin [facility])  Assessment & Plan  Lab Results   Component Value Date    HGBA1C 7 2 (H) 10/20/2022       Recent Labs     01/02/23 2012 01/03/23  0539 01/03/23  1112 01/03/23  1614   POCGLU 155* 203* 207* 108       Blood Sugar Average: Last 72 hrs:  (P) 149 6   · Continue home Lantus 18 unit nightly, lispro 5 unit 3 times daily AC  · Diabetic diet, Accu-Cheks    Hypertension with renal disease  Assessment & Plan  · BP stable over the last 24 hours  · Hold isosorbide mononitrate, Flomax, metoprolol for now      VTE Pharmacologic Prophylaxis:   Pharmacologic: Pharmacologic VTE Prophylaxis contraindicated due to Symptomatic anemia  Mechanical VTE Prophylaxis in Place: Yes    Patient Centered Rounds: I have performed bedside rounds with nursing staff today      Discussions with Specialists or Other Care Team Provider: Discussed with case management, nurse    Education and Discussions with Family / Patient: Discussed with patient, wife and family member at bedside    Time Spent for Care: 45 minutes  More than 50% of total time spent on counseling and coordination of care as described above  Current Length of Stay: 0 day(s)    Current Patient Status: Inpatient   Certification Statement: The patient will continue to require additional inpatient hospital stay due to Patient is medically clear, pending placement to [facility]    Discharge Plan / Estimated Discharge Date: Pending placement to [facility]      Code Status: Level 1 - Full Code      Subjective:   Patient was seen and examined at bedside  The patient denies any fever, chills, cough, chest pain, palpitation, shortness of breath, N/V, abd pain  Objective:     Vitals:   Temp (24hrs), Av 6 °F (36 4 °C), Min:97 2 °F (36 2 °C), Max:98 2 °F (36 8 °C)    Temp:  [97 2 °F (36 2 °C)-98 2 °F (36 8 °C)] 97 4 °F (36 3 °C)  HR:  [63-80] 69  Resp:  [18-20] 18  BP: (102-133)/(64-85) 133/73  SpO2:  [97 %-100 %] 98 %  Body mass index is 25 76 kg/m²  Input and Output Summary (last 24 hours):        Intake/Output Summary (Last 24 hours) at 1/3/2023 1705  Last data filed at 1/3/2023 1601  Gross per 24 hour   Intake 820 84 ml   Output 1060 ml   Net -239 16 ml       Physical Exam:     Physical Exam  General: breathing well on room air, no acute distress  HEENT: NC/AT, PERRL, EOM - normal  Neck: Supple  Pulm/Chest: Normal chest wall expansion, clear breath sounds on both side, no wheezing/rhonchi or crackles appreciated, pleurodesis catheter noted  CVS: RRR, normal S1&S2, no murmur appreciated, capillary refill <2s  Abd: soft, non tender, non distended, bowel sounds +, 2 PCN noted with right urine bag with hematuria  MSK: move all 4 extremities spontaneously  Skin: warm  CNS: no acute focal neuro deficit      Additional Data:     Labs:    Results from last 7 days   Lab Units 23  1101 23  0225 23  1340 22  1228 22  0727 Genitourinary: Negative for dysuria, urgency, frequency, hematuria, flank pain and difficulty urinating. Musculoskeletal: Negative for back pain and arthralgias. Skin: Negative for color change, rash and wound. Neurological: Negative for dizziness, seizures, syncope, speech difficulty, light-headedness or headaches. Hematological: Does not bruise/bleed easily. Psychiatric/Behavioral: Negative for suicidal ideas, hallucinations, behavioral problems, self-injury or agitation         Objective: Body mass index is 19.93 kg/m².   Vitals:    10/19/22 1940 10/19/22 2021 10/20/22 0313 10/20/22 0851   BP:  134/78 (!) 143/76 126/75   Pulse:  92 93 90   Resp:  20 20 17   Temp:  97.9 °F (36.6 °C) 99 °F (37.2 °C) 98.7 °F (37.1 °C)   SpO2: 95% 95% 96% 95%   Weight:       Height:            Physical Exam:  General appearance - sleepy , wakes up easily to alert and oriented X3   Eyes - pupils equal and reactive, extraocular eye movements intact  Ears - bilateral TM's and external ear canals normal  Nose - normal and patent, no erythema, discharge or polyps  Chest -bilateral basal fine rales  Heart - normal rate, regular rhythm, normal S1, S2, no murmurs, rubs, clicks or gallops  Abdomen - soft, nontender, nondistended, no masses or organomegaly  Extremities - no pedal edema noted         Hospital Problems  Date Reviewed: 9/1/2022            Codes Class Noted POA    [facility] ICD-10-CM: K63.1  ICD-9-CM: 569.83  10/15/2022 Unknown           CBC:  Lab Results   Component Value Date/Time    WBC 3.4 (L) 10/20/2022 01:28 AM    HGB 9.1 (L) 10/20/2022 01:28 AM    HCT 28.4 (L) 10/20/2022 01:28 AM    PLATELET 527 (L) 19/08/9134 01:28 AM    MCV 91.9 10/20/2022 01:28 AM        CMP:  Lab Results   Component Value Date/Time    Sodium 139 10/20/2022 01:28 AM    Potassium 3.3 (L) 10/20/2022 01:28 AM    Chloride 115 (H) 10/20/2022 01:28 AM    CO2 17 (L) 10/20/2022 01:28 AM    Anion gap 7 10/20/2022 01:28 AM    Glucose 121 (H) 10/20/2022 01:28 AM    BUN 6 (L) 10/20/2022 01:28 AM    Creatinine 0.32 (L) 10/20/2022 01:28 AM    BUN/Creatinine ratio 19 10/20/2022 01:28 AM    GFR est AA >60 06/29/2021 02:27 PM    GFR est non-AA >60 06/29/2021 02:27 PM    Calcium 7.9 (L) 10/20/2022 01:28 AM    Alk. phosphatase 62 10/15/2022 09:00 PM    Protein, total 6.0 (L) 10/15/2022 09:00 PM    Albumin 2.9 (L) 10/15/2022 09:00 PM    Globulin 3.1 10/15/2022 09:00 PM    A-G Ratio 0.9 10/15/2022 09:00 PM    ALT (SGPT) 20 10/15/2022 09:00 PM        PT/INR  No results found for: INR, PTMR, PTP, PT1, PT2, INREXT, INREXT         EKG: No results found for this or any previous visit. Assessment And  Plan:     1 H/o Rectal cancer s/p perforation -- S/p s/p diverting colostomy( 10/15/2022)  2 H/o Asthma   3 h/o Migraine  4 Post op SOB - ( to day ) - chest x ray - ? P Edema / ? Infection ( Procal ordered ) , unlikely hemorrhage - h/h stable so far   5 Anemia of chronic illness   6 Mild Neutropenia   7 Hypokalemia  9 Low phosphorous  10 Blood culture - 10-15 - 22 - so far negative        PLAN    SOB-patient who had surgery this admission following bowel perforation and with a history of f colon CA, postop recovery is okay today patient developed this some shortness of breath chest x-ray suggestive of possible developing pneumonia, fluid overload or pulmonary edema, hemorrhage etc.    Hibiscus at this point is fluid overload patient probably does not have a history of congestive failure however we do not have echo which I have ordered. I will also order Pro-Javier-for ruling out any pneumonia, continue following H&H.  ,   For treatment point of view outside patient on Lasix low-dose twice daily, discontinue IV maintenance fluid, poor p.o. intake hence placed patient on Accu-Cheks, replace potassium and phosphorus.     Patient will get a repeat x-ray once good diuresis is obtained to establish diagnosis of fluid overload and also follow echocardiogram WBC Thousand/uL  --   --  9 14   < > 10 16   HEMOGLOBIN g/dL 8 7*   < > 7 2*   < > 7 0*   HEMATOCRIT % 27 8*   < > 23 6*   < > 23 3*   PLATELETS Thousands/uL  --   --  209   < > 247   NEUTROS PCT %  --   --   --   --  69   LYMPHS PCT %  --   --   --   --  16   MONOS PCT %  --   --   --   --  7   EOS PCT %  --   --   --   --  7*    < > = values in this interval not displayed  Results from last 7 days   Lab Units 01/02/23  1340   POTASSIUM mmol/L 4 7   CHLORIDE mmol/L 102   CO2 mmol/L 24   BUN mg/dL 68*   CREATININE mg/dL 3 66*   CALCIUM mg/dL 8 7   ALK PHOS U/L 89   ALT U/L 26   AST U/L 35     Results from last 7 days   Lab Units 01/02/23  1345   INR  1 06       * I Have Reviewed All Lab Data Listed Above  * Additional Pertinent Lab Tests Reviewed: Sergio 66 Admission Reviewed    Imaging:      I have reviewed pertinent imaging        Recent Cultures (last 7 days):           Last 24 Hours Medication List:   Current Facility-Administered Medications   Medication Dose Route Frequency Provider Last Rate   • acetaminophen  650 mg Oral Once Roosevelt Vaughn PA-C     • acetaminophen  650 mg Oral Q6H PRN Miguel Kraus MD     • aluminum-magnesium hydroxide-simethicone  30 mL Oral Q6H PRN Miguel Kraus MD     • ARIPiprazole  1 mg Oral Daily Miguel Kraus MD     • aspirin  81 mg Oral Daily Miguel Kraus MD     • bimatoprost  1 drop Both Eyes HS Miguel Kraus MD     • cyanocobalamin  1,000 mcg Oral Daily Miguel Kraus MD     • DULoxetine  30 mg Oral Daily Miguel Kraus MD     • ezetimibe  10 mg Oral Daily Miguel Kraus MD     • ferrous sulfate  325 mg Oral Daily With Breakfast Miguel Kraus MD     • furosemide  20 mg Oral Daily Miguel Kraus MD     • gabapentin  300 mg Oral HS Miguel Kraus MD     • insulin glargine  18 Units Subcutaneous HS Miguel Kraus MD     • insulin lispro  1-6 Units Subcutaneous TID AC Miguel Kraus MD     • insulin lispro  5 Units Subcutaneous TID With Meals Miguel Kraus MD     • ondansetron  4 mg Intravenous Q6H PRN Willie Mcduffie MD     • polyethylene glycol  17 g Oral Daily PRN Willie Mcduffie MD     • senna  2 tablet Oral BID Willie Mcduffie MD          Today, Patient Was Seen By: Willie Mcduffie MD    ** Please Note: Dragon 360 Dictation voice to text software may have been used in the creation of this document   ** report      Rest of the issue per surgery      Signed By: Alba Dillon MD     October 20, 2022

## 2023-01-03 NOTE — ASSESSMENT & PLAN NOTE
· Likely non MI troponin elevation due to symptomatic anemia, troponin flat  · patient does not have chest pain  · EKG showed no acute ST-T changes  · ED discussed with cardiologist

## 2023-01-03 NOTE — ASSESSMENT & PLAN NOTE
· RRT was called because patient was briefly unresponsive with hypotension  · BP stable after IV fluid boluses in ED  · Hb improved to 8 7 after 2 pRBC transfusion (baseline hemoglobin has been around 7)  · Follow-up with urologist for ongoing hematuria and bladder cancer treatment  · Patient is medically cleared to go back to [facility]

## 2023-01-03 NOTE — PLAN OF CARE
Problem: Potential for Falls  Goal: Patient will remain free of falls  Description: INTERVENTIONS:  - Educate patient/family on patient safety including physical limitations  - Instruct patient to call for assistance with activity   - Consult OT/PT to assist with strengthening/mobility   - Keep Call bell within reach  - Keep bed low and locked with side rails adjusted as appropriate  - Keep care items and personal belongings within reach  - Initiate and maintain comfort rounds  - Make Fall Risk Sign visible to staff  - Apply yellow socks and bracelet for high fall risk patients  - Consider moving patient to room near nurses station  Outcome: Progressing     Problem: Prexisting or High Potential for Compromised Skin Integrity  Goal: Skin integrity is maintained or improved  Description: INTERVENTIONS:  - Identify patients at risk for skin breakdown  - Assess and monitor skin integrity  - Assess and monitor nutrition and hydration status  - Monitor labs   - Assess for incontinence   - Turn and reposition patient  - Assist with mobility/ambulation  - Relieve pressure over bony prominences  - Avoid friction and shearing  - Provide appropriate hygiene as needed including keeping skin clean and dry  - Evaluate need for skin moisturizer/barrier cream  - Collaborate with interdisciplinary team   - Patient/family teaching  - Consider wound care consult   Outcome: Progressing     Problem: MOBILITY - ADULT  Goal: Maintain or return to baseline ADL function  Description: INTERVENTIONS:  -  Assess patient's ability to carry out ADLs; assess patient's baseline for ADL function and identify physical deficits which impact ability to perform ADLs (bathing, care of mouth/teeth, toileting, grooming, dressing, etc )  - Assess/evaluate cause of self-care deficits   - Assess range of motion  - Assess patient's mobility; develop plan if impaired  - Assess patient's need for assistive devices and provide as appropriate  - Encourage maximum independence but intervene and supervise when necessary  - Involve family in performance of ADLs  - Assess for home care needs following discharge   - Consider OT consult to assist with ADL evaluation and planning for discharge  - Provide patient education as appropriate  Outcome: Progressing  Goal: Maintains/Returns to pre admission functional level  Description: INTERVENTIONS:  - Perform BMAT or MOVE assessment daily    - Set and communicate daily mobility goal to care team and patient/family/caregiver  - Collaborate with rehabilitation services on mobility goals if consulted  - Out of bed for toileting  - Record patient progress and toleration of activity level   Outcome: Progressing     Problem: INFECTION - ADULT  Goal: Absence or prevention of progression during hospitalization  Description: INTERVENTIONS:  - Assess and monitor for signs and symptoms of infection  - Monitor lab/diagnostic results  - Monitor all insertion sites, i e  indwelling lines, tubes, and drains  - Monitor endotracheal if appropriate and nasal secretions for changes in amount and color  - [place] appropriate cooling/warming therapies per order  - Administer medications as ordered  - Instruct and encourage patient and family to use good hand hygiene technique  - Identify and instruct in appropriate isolation precautions for identified infection/condition  Outcome: Progressing  Goal: Absence of fever/infection during neutropenic period  Description: INTERVENTIONS:  - Monitor WBC    Outcome: Progressing     Problem: Nutrition/Hydration-ADULT  Goal: Nutrient/Hydration intake appropriate for improving, restoring or maintaining nutritional needs  Description: Monitor and assess patient's nutrition/hydration status for malnutrition  Collaborate with interdisciplinary team and initiate plan and interventions as ordered  Monitor patient's weight and dietary intake as ordered or per policy   Utilize nutrition screening tool and intervene as necessary  Determine patient's food preferences and provide high-protein, high-caloric foods as appropriate       INTERVENTIONS:  - Monitor oral intake, urinary output, labs, and treatment plans  - Assess nutrition and hydration status and recommend course of action  - Evaluate amount of meals eaten  - Assist patient with eating if necessary   - Allow adequate time for meals  - Recommend/ encourage appropriate diets, oral nutritional supplements, and vitamin/mineral supplements  - Assess need for intravenous fluids  - Provide specific nutrition/hydration education as appropriate  - Include patient/family/caregiver in decisions related to nutrition  Outcome: Progressing     Problem: HEMATOLOGIC - ADULT  Goal: Maintains hematologic stability  Description: INTERVENTIONS  - Assess for signs and symptoms of bleeding or hemorrhage  - Monitor labs  - Administer supportive blood products/factors as ordered and appropriate  Outcome: Progressing

## 2023-01-03 NOTE — ASSESSMENT & PLAN NOTE
· History of high-grade treatment refractory bladder cancer s/p chemoradiation in 2021, currently on immunotherapy  · Recently and SLB for malignant bilateral ureteral obstruction s/p bilateral CN  · On 12/24/2022 urology follows patient in [facility], commended to maintain bilateral nephrostomy tubes with daily washing and cleansing  Recommended follow-up with Dr Asher Morgan(primary urologist) for repeat cystoscopy, retrograde pyelography, right ureteral stent exchange and possible TURBT/repeat bladder fulguration in approximately 12 weeks  · Discussed with urologist - [place] Handing regarding RRT event and symptomatic anemia  Patient had recent procedure stent changed and recommendation remains the same with back to OR in about 10 weeks  · Discussed about Code status during rapid response  He remains full code

## 2023-01-03 NOTE — ASSESSMENT & PLAN NOTE
· Due to symptomatic anemia and hypotension  · Patient was on Isorbide mononitrate, Lasix 20 mg, Flomax 0 4 mg, metoprolol  · Will hold isosorbide mononitrate, metoprolol and Flomax for now  · BP is 118/67 this morning, stable over last 24 hours  · CT head & Spine - negative for acute abnormality other than multilevel facet joint disease with multilevel foraminal narrowing  · Left ankle x-ray showed no acute osseous abnormality

## 2023-01-03 NOTE — ASSESSMENT & PLAN NOTE
· Incidental finding of influenza A on screening  · patient is asymptomatic with afebrile over the last 24 hours, SPO2 99% on room air   · Patient has no major complaints, continue supportive care  · If patient becomes symptomatic, would benefit from oral Tamiflu course

## 2023-01-03 NOTE — DISCHARGE SUMMARY
[facility]  Discharge- Pawan Mallory 1945, 76 y o  male MRN: [id number]  Unit/Bed#: 7T [facility] 710-01 Encounter: [id number]  Primary Care Provider: Nate Wheeler MD   Date and time admitted to hospital: 1/2/2023  1:31 PM    * Symptomatic anemia  Assessment & Plan  · RRT was called because patient was briefly unresponsive with hypotension  · BP stable after IV fluid boluses in ED  · Hb improved to 8 7 after 2 pRBC transfusion (baseline hemoglobin has been around 7)  · Follow-up with urologist for ongoing hematuria and bladder cancer treatment  · Patient is medically cleared to go back to [facility]     Influenza A  Assessment & Plan  · Incidental finding of influenza A on screening  · patient is asymptomatic with afebrile over the last 24 hours, SPO2 99% on room air   · Patient has no major complaints, continue supportive care  · If patient becomes symptomatic, would benefit from oral Tamiflu course    Elevated troponin  Assessment & Plan  · Likely non MI troponin elevation due to symptomatic anemia, troponin flat  · patient does not have chest pain  · EKG showed no acute ST-T changes  · ED discussed with cardiologist     Syncope and collapse  Assessment & Plan  · Due to symptomatic anemia and hypotension  · Patient was on Isorbide mononitrate, Lasix 20 mg, Flomax 0 4 mg, metoprolol  · Will hold isosorbide mononitrate, metoprolol and Flomax for now  · BP is 118/67 this morning, stable over last 24 hours  · CT head & Spine - negative for acute abnormality other than multilevel facet joint disease with multilevel foraminal narrowing  · Left ankle x-ray showed no acute osseous abnormality       CKD (chronic kidney disease)  Assessment & Plan  Lab Results   Component Value Date    EGFR 14 01/02/2023    EGFR 15 12/30/2022    EGFR 15 12/29/2022    CREATININE 3 66 (H) 01/02/2023    CREATININE 3 53 (H) 12/30/2022    CREATININE 3 62 (H) 12/29/2022     · Creatinine seems to be around his baseline  · Ongoing follow-up with nephrologist in [facility]  · Avoid hypotension, nephrotoxic agents    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)  Assessment & Plan  · History of high-grade treatment refractory bladder cancer s/p chemoradiation in 2021, currently on immunotherapy  · Recently and SLB for malignant bilateral ureteral obstruction s/p bilateral CN  · On 12/24/2022 urology follows patient in [facility], commended to maintain bilateral nephrostomy tubes with daily washing and cleansing  Recommended follow-up with Dr Ki Morgan(primary urologist) for repeat cystoscopy, retrograde pyelography, right ureteral stent exchange and possible TURBT/repeat bladder fulguration in approximately 12 weeks  · Discussed with urologist - Matthew Arriaza regarding RRT event and symptomatic anemia  Patient had recent procedure stent changed and recommendation remains the same with back to OR in about 10 weeks  · Discussed about Code status during rapid response  He remains full code      Type 2 diabetes mellitus, with long-term current use of insulin [facility])  Assessment & Plan  Lab Results   Component Value Date    HGBA1C 7 2 (H) 10/20/2022       Recent Labs     01/02/23  1725 01/02/23 2012 01/03/23  0539 01/03/23  1112   POCGLU 75 155* 203* 207*       Blood Sugar Average: Last 72 hrs:  (P) 160   · Continue home Lantus 18 unit nightly, lispro 5 unit 3 times daily AC  · Diabetic diet, Accu-Cheks    Hypertension with renal disease  Assessment & Plan  · BP stable over the last 24 hours  · Hold isosorbide mononitrate, Flomax, metoprolol for now      Discharging Physician / Practitioner: Enrique Calero MD  PCP: Ruben Albright MD  Admission Date:   Admission Orders (From admission, onward)     Ordered        01/02/23 1519  Place in Observation  Once                      Discharge Date: 01/03/23    Medical Problems     Resolved Problems  Date Reviewed: 1/3/2023   None         Consultations During [facility] Stay:  · E-consult with urologist    Procedures Performed:   · None    Significant Findings / Test Results:   XR ankle 3+ vw left Result Date: 1/3/2023  Impression: No acute osseous abnormality  Workstation performed: [id number]     CT head wo contrast Result Date: 1/2/2023  Impression: No acute intracranial hemorrhage seen No mass effect or midline shift seen Workstation performed: [id number]     CT spine cervical without contrast Result Date: 1/2/2023  Impression: No acute compression collapse moderate central canal narrowing at C6/7 Multilevel facet joint disease with multilevel foraminal narrowing  Workstation performed: [id number]     Incidental Findings:   · See above     Test Results Pending at Discharge (will require follow up): · None     Outpatient Tests Requested:  · None    Complications:  None    Reason for Admission: symptomatic anemia    Hospital Course:     Hetal Mosqueda is a 76 y o  male patient with PMH of high-grade refractory bladder malignancy with hematuria, transfusion dependent anemia, HTN, CKD who originally presented to the hospital on 1/2/2023 due to symptomatic anemia  Initial BP was 90/60s which improved to 100/60s over IV fluid boluses  Currently his BP is 118/67 this morning  Isosorbide mononitrate, metoprolol, Flomax were hold due to hypotension  The work-ups including CT head, CT cervical spine, left ankle x-ray were negative  Patient received 2 pRBC transfusion and hemoglobin improved to 8 7  Discussed with urologist regarding symptomatic anemia and ongoing hematuria  At this point, no intervention required and plan to follow-up in 10 weeks  Patient was found to be influenza A positive on screening however patient is asymptomatic  Patient remains afebrile over the last 24 hours, SPO2 99% on room air  Patient denies any symptoms  Patient is clinically  and hemodynamically stable to go back to [facility]        Please see above list of diagnoses and related plan for additional information  Condition at Discharge: stable     Discharge Day Visit / Exam:     Subjective:  Patient was seen and examined at bedside  The patient denies any pain, headache, blurry vision, chest pain, palpitation, shortness of breath, N/V, abd pain  Vitals: Blood Pressure: 118/67 (01/03/23 0718)  Pulse: 63 (01/03/23 0718)  Temperature: 97 8 °F (36 6 °C) (01/03/23 0718)  Temp Source: Temporal (01/03/23 0718)  Respirations: 18 (01/03/23 0718)  Height: 6' 4" (193 cm) (01/02/23 1620)  Weight - Scale: 96 kg (211 lb 10 3 oz) (01/02/23 1620)  SpO2: 99 % (01/03/23 0718)  Exam:   Physical Exam  General: breathing well on room air, no acute distress  HEENT: NC/AT, PERRL, EOM - normal  Neck: Supple  Pulm/Chest: Normal chest wall expansion, clear breath sounds on both side, no wheezing/rhonchi or crackles appreciated, left pleurodesis catheter noted  CVS: RRR, normal S1&S2, no murmur appreciated, capillary refill <2s  Abd: soft, non tender, non distended, bowel sounds +, 2 PCNs noted  MSK: move all 4 extremities spontaneously  Skin: warm  CNS: no acute focal neuro deficit    Discussion with Family: Discussed with wife and family member at bedside    Discharge instructions/Information to patient and family:   See after visit summary for information provided to patient and family  Provisions for Follow-Up Care:  See after visit summary for information related to follow-up care and any pertinent home health orders  Disposition:     Other [facility] at [address] [facility]:   · Not Applicable to this Patient - Not Applicable to this Patient    Planned Readmission: No     Discharge Statement:  I spent 45 minutes discharging the patient  This time was spent on the day of discharge  I had direct contact with the patient on the day of discharge   Greater than 50% of the total time was spent examining patient, answering all patient questions, arranging and discussing plan of care with patient as well as directly providing post-discharge instructions  Additional time then spent on discharge activities  Discharge Medications:  See after visit summary for reconciled discharge medications provided to patient and family        ** Please Note: This note has been constructed using a voice recognition system **

## 2023-01-03 NOTE — ASSESSMENT & PLAN NOTE
· History of high-grade treatment refractory bladder cancer s/p chemoradiation in 2021, currently on immunotherapy  · Recently and SLB for malignant bilateral ureteral obstruction s/p bilateral CN  · On 12/24/2022 urology follows patient in [facility], commended to maintain bilateral nephrostomy tubes with daily washing and cleansing  Recommended follow-up with Dr Diana Morgan(primary urologist) for repeat cystoscopy, retrograde pyelography, right ureteral stent exchange and possible TURBT/repeat bladder fulguration in approximately 12 weeks  · Discussed with urologist - Chet Arreguin regarding RRT event and symptomatic anemia  Patient had recent procedure stent changed and recommendation remains the same with back to OR in about 10 weeks  · Discussed about Code status during rapid response  He remains full code

## 2023-01-03 NOTE — ASSESSMENT & PLAN NOTE
Lab Results   Component Value Date    HGBA1C 7 2 (H) 10/20/2022       Recent Labs     01/02/23 2012 01/03/23  0539 01/03/23  1112 01/03/23  1614   POCGLU 155* 203* 207* 108       Blood Sugar Average: Last 72 hrs:  (P) 149 6   · Continue home Lantus 18 unit nightly, lispro 5 unit 3 times daily AC  · Diabetic diet, Accu-Cheks

## 2023-01-03 NOTE — DISCHARGE INSTR - AVS FIRST PAGE
Discharge instructions from hospitalist  1  Follow-up with your primary care physician in 1 week in regards to recent hospitalization  Follow-up with urologist and nephrologist     2  Take medications regularly    Changes in medications    Hold isosorbide mononitrate, Toprol-XL, tamsulosin due to recent hypotension and syncope  Please limit narcotic use  3  Come back to the ER if symptoms recur or worsen  4  Activity as tolerated  5   Diet :  Heart healthy diet; information packet has more detailed information

## 2023-01-03 NOTE — ASSESSMENT & PLAN NOTE
Lab Results   Component Value Date    HGBA1C 7 2 (H) 10/20/2022       Recent Labs     01/02/23  1725 01/02/23 2012 01/03/23  0539 01/03/23  1112   POCGLU 75 155* 203* 207*       Blood Sugar Average: Last 72 hrs:  (P) 160   · Continue home Lantus 18 unit nightly, lispro 5 unit 3 times daily AC  · Diabetic diet, Accu-Cheks Suturegard Body: The suture ends were repeatedly re-tightened and re-clamped to achieve the desired tissue expansion.

## 2023-01-04 ENCOUNTER — TRANSITIONAL CARE MANAGEMENT (OUTPATIENT)
Dept: INTERNAL MEDICINE CLINIC | Facility: CLINIC | Age: 78
End: 2023-01-04

## 2023-01-04 ENCOUNTER — HOSPITAL ENCOUNTER (INPATIENT)
Facility: HOSPITAL | Age: 78
LOS: 3 days | Discharge: STILL ADMITTED OR ADMINISTRATIVE | End: 2023-01-07
Attending: FAMILY MEDICINE | Admitting: FAMILY MEDICINE

## 2023-01-04 VITALS
TEMPERATURE: 96.4 F | HEART RATE: 73 BPM | RESPIRATION RATE: 18 BRPM | OXYGEN SATURATION: 100 % | SYSTOLIC BLOOD PRESSURE: 126 MMHG | DIASTOLIC BLOOD PRESSURE: 76 MMHG | BODY MASS INDEX: 25.77 KG/M2 | WEIGHT: 211.64 LBS | HEIGHT: 76 IN

## 2023-01-04 DIAGNOSIS — D69.6 THROMBOCYTOPENIA (HCC): ICD-10-CM

## 2023-01-04 DIAGNOSIS — C67.9 BLADDER CARCINOMA (HCC): Primary | ICD-10-CM

## 2023-01-04 DIAGNOSIS — I95.1 ORTHOSTATIC HYPOTENSION: ICD-10-CM

## 2023-01-04 LAB
ERYTHROCYTE [DISTWIDTH] IN BLOOD BY AUTOMATED COUNT: 15.9 % (ref 11.6–15.1)
GLUCOSE SERPL-MCNC: 100 MG/DL (ref 65–140)
GLUCOSE SERPL-MCNC: 102 MG/DL (ref 65–140)
GLUCOSE SERPL-MCNC: 67 MG/DL (ref 65–140)
HCT VFR BLD AUTO: 26.1 % (ref 36.5–49.3)
HGB BLD-MCNC: 8.5 G/DL (ref 12–17)
MCH RBC QN AUTO: 30 PG (ref 26.8–34.3)
MCHC RBC AUTO-ENTMCNC: 32.6 G/DL (ref 31.4–37.4)
MCV RBC AUTO: 92 FL (ref 82–98)
PLATELET # BLD AUTO: 160 THOUSANDS/UL (ref 149–390)
PMV BLD AUTO: 10.5 FL (ref 8.9–12.7)
RBC # BLD AUTO: 2.83 MILLION/UL (ref 3.88–5.62)
WBC # BLD AUTO: 10.39 THOUSAND/UL (ref 4.31–10.16)

## 2023-01-04 RX ADMIN — CYANOCOBALAMIN TAB 1000 MCG 1000 MCG: 1000 TAB at 08:27

## 2023-01-04 RX ADMIN — ARIPIPRAZOLE 1 MG: 2 TABLET ORAL at 08:27

## 2023-01-04 RX ADMIN — EZETIMIBE 10 MG: 10 TABLET ORAL at 08:27

## 2023-01-04 RX ADMIN — FUROSEMIDE 20 MG: 20 TABLET ORAL at 08:27

## 2023-01-04 RX ADMIN — DULOXETINE HYDROCHLORIDE 30 MG: 30 CAPSULE, DELAYED RELEASE ORAL at 08:27

## 2023-01-04 RX ADMIN — ASPIRIN 81 MG: 81 TABLET, COATED ORAL at 08:27

## 2023-01-04 RX ADMIN — FERROUS SULFATE TAB 325 MG (65 MG ELEMENTAL FE) 325 MG: 325 (65 FE) TAB at 08:27

## 2023-01-04 RX ADMIN — ACETAMINOPHEN 650 MG: 325 TABLET ORAL at 08:27

## 2023-01-04 NOTE — ASSESSMENT & PLAN NOTE
· RRT was called because patient was briefly unresponsive with hypotension  · BP stable after IV fluid boluses in ED  · Hb improved to 8 7 after 2 pRBC transfusion (baseline hemoglobin has been around 7)  · Currently hemoglobin is stable at 8 5  · Follow-up with urologist for ongoing hematuria and bladder cancer treatment  · Patient is medically cleared to go back to [facility]

## 2023-01-04 NOTE — ASSESSMENT & PLAN NOTE
· History of high-grade treatment refractory bladder cancer s/p chemoradiation in 2021, currently on immunotherapy  · Recently and SLB for malignant bilateral ureteral obstruction s/p bilateral CN  · On 12/24/2022 urology follows patient in [facility], commended to maintain bilateral nephrostomy tubes with daily washing and cleansing  Recommended follow-up with Dr Bhavya Morgan(primary urologist) for repeat cystoscopy, retrograde pyelography, right ureteral stent exchange and possible TURBT/repeat bladder fulguration in approximately 12 weeks  · Discussed with urologist - Juan Etienne regarding RRT event and symptomatic anemia  Patient had recent procedure stent changed and recommendation remains the same with back to OR in about 10 weeks  · Discussed about Code status during rapid response  He remains full code

## 2023-01-04 NOTE — PLAN OF CARE
Problem: Potential for Falls  Goal: Patient will remain free of falls  Description: INTERVENTIONS:  - Educate patient/family on patient safety including physical limitations  - Instruct patient to call for assistance with activity   - Consult OT/PT to assist with strengthening/mobility   - Keep Call bell within reach  - Keep bed low and locked with side rails adjusted as appropriate  - Keep care items and personal belongings within reach  - Initiate and maintain comfort rounds  - Make Fall Risk Sign visible to staff  - Apply yellow socks and bracelet for high fall risk patients  - Consider moving patient to room near nurses station  Outcome: Progressing     Problem: Prexisting or High Potential for Compromised Skin Integrity  Goal: Skin integrity is maintained or improved  Description: INTERVENTIONS:  - Identify patients at risk for skin breakdown  - Assess and monitor skin integrity  - Assess and monitor nutrition and hydration status  - Monitor labs   - Assess for incontinence   - Turn and reposition patient  - Assist with mobility/ambulation  - Relieve pressure over bony prominences  - Avoid friction and shearing  - Provide appropriate hygiene as needed including keeping skin clean and dry  - Evaluate need for skin moisturizer/barrier cream  - Collaborate with interdisciplinary team   - Patient/family teaching  - Consider wound care consult   Outcome: Progressing     Problem: MOBILITY - ADULT  Goal: Maintain or return to baseline ADL function  Description: INTERVENTIONS:  -  Assess patient's ability to carry out ADLs; assess patient's baseline for ADL function and identify physical deficits which impact ability to perform ADLs (bathing, care of mouth/teeth, toileting, grooming, dressing, etc )  - Assess/evaluate cause of self-care deficits   - Assess range of motion  - Assess patient's mobility; develop plan if impaired  - Assess patient's need for assistive devices and provide as appropriate  - Encourage maximum independence but intervene and supervise when necessary  - Involve family in performance of ADLs  - Assess for home care needs following discharge   - Consider OT consult to assist with ADL evaluation and planning for discharge  - Provide patient education as appropriate  Outcome: Progressing  Goal: Maintains/Returns to pre admission functional level  Description: INTERVENTIONS:  - Perform BMAT or MOVE assessment daily    - Set and communicate daily mobility goal to care team and patient/family/caregiver  - Collaborate with rehabilitation services on mobility goals if consulted  - Out of bed for toileting  - Record patient progress and toleration of activity level   Outcome: Progressing     Problem: INFECTION - ADULT  Goal: Absence or prevention of progression during hospitalization  Description: INTERVENTIONS:  - Assess and monitor for signs and symptoms of infection  - Monitor lab/diagnostic results  - Monitor all insertion sites, i e  indwelling lines, tubes, and drains  - Monitor endotracheal if appropriate and nasal secretions for changes in amount and color  - [place] appropriate cooling/warming therapies per order  - Administer medications as ordered  - Instruct and encourage patient and family to use good hand hygiene technique  - Identify and instruct in appropriate isolation precautions for identified infection/condition  Outcome: Progressing  Goal: Absence of fever/infection during neutropenic period  Description: INTERVENTIONS:  - Monitor WBC    Outcome: Progressing     Problem: Nutrition/Hydration-ADULT  Goal: Nutrient/Hydration intake appropriate for improving, restoring or maintaining nutritional needs  Description: Monitor and assess patient's nutrition/hydration status for malnutrition  Collaborate with interdisciplinary team and initiate plan and interventions as ordered  Monitor patient's weight and dietary intake as ordered or per policy   Utilize nutrition screening tool and intervene as necessary  Determine patient's food preferences and provide high-protein, high-caloric foods as appropriate       INTERVENTIONS:  - Monitor oral intake, urinary output, labs, and treatment plans  - Assess nutrition and hydration status and recommend course of action  - Evaluate amount of meals eaten  - Assist patient with eating if necessary   - Allow adequate time for meals  - Recommend/ encourage appropriate diets, oral nutritional supplements, and vitamin/mineral supplements  - Assess need for intravenous fluids  - Provide specific nutrition/hydration education as appropriate  - Include patient/family/caregiver in decisions related to nutrition  Outcome: Progressing     Problem: HEMATOLOGIC - ADULT  Goal: Maintains hematologic stability  Description: INTERVENTIONS  - Assess for signs and symptoms of bleeding or hemorrhage  - Monitor labs  - Administer supportive blood products/factors as ordered and appropriate  Outcome: Progressing

## 2023-01-04 NOTE — NURSING NOTE
Report called to [facility]  Pt has no complaints and no signs of distress at this time   Pt brought top [facility] in wheelchair with PCA

## 2023-01-04 NOTE — DISCHARGE SUMMARY
[facility]  Discharge- Alvarez Diallo 1945, 76 y o  male MRN: [id number]  Unit/Bed#: 7T [facility] 710-01 Encounter: [id number]  Primary Care Provider: Lana Almanzar MD   Date and time admitted to hospital: 1/2/2023  1:31 PM    * Symptomatic anemia  Assessment & Plan  · RRT was called because patient was briefly unresponsive with hypotension  · BP stable after IV fluid boluses in ED  · Hb improved to 8 7 after 2 pRBC transfusion (baseline hemoglobin has been around 7)  · Currently hemoglobin is stable at 8 5  · Follow-up with urologist for ongoing hematuria and bladder cancer treatment  · Patient is medically cleared to go back to [facility]     Influenza A  Assessment & Plan  · Incidental finding of influenza A on screening  · patient is asymptomatic with afebrile over the last 24 hours, SPO2 99% on room air   · Patient has no major complaints, continue supportive care  · If patient becomes symptomatic, would benefit from oral Tamiflu course    Elevated troponin  Assessment & Plan  · Likely non MI troponin elevation due to symptomatic anemia, troponin flat  · patient does not have chest pain  · EKG showed no acute ST-T changes  · ED discussed with cardiologist     Syncope and collapse  Assessment & Plan  · Due to symptomatic anemia and hypotension  · Patient was on Isorbide mononitrate, Lasix 20 mg, Flomax 0 4 mg, metoprolol  · Will hold isosorbide mononitrate, metoprolol and Flomax for now  · BP is 126/76 this morning, stable over last 24 hours  · CT head & Spine - negative for acute abnormality other than multilevel facet joint disease with multilevel foraminal narrowing  · Left ankle x-ray showed no acute osseous abnormality       CKD (chronic kidney disease)  Assessment & Plan  Lab Results   Component Value Date    EGFR 14 01/02/2023    EGFR 15 12/30/2022    EGFR 15 12/29/2022    CREATININE 3 66 (H) 01/02/2023    CREATININE 3 53 (H) 12/30/2022    CREATININE 3 62 (H) 12/29/2022 · Creatinine seems to be around his baseline  · Ongoing follow-up with nephrologist in [facility]  · Avoid hypotension, nephrotoxic agents    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)  Assessment & Plan  · History of high-grade treatment refractory bladder cancer s/p chemoradiation in 2021, currently on immunotherapy  · Recently and SLB for malignant bilateral ureteral obstruction s/p bilateral CN  · On 12/24/2022 urology follows patient in [facility], commended to maintain bilateral nephrostomy tubes with daily washing and cleansing  Recommended follow-up with Dr Iftikhar Morgan(primary urologist) for repeat cystoscopy, retrograde pyelography, right ureteral stent exchange and possible TURBT/repeat bladder fulguration in approximately 12 weeks  · Discussed with urologist - Any Lawrence regarding RRT event and symptomatic anemia  Patient had recent procedure stent changed and recommendation remains the same with back to OR in about 10 weeks  · Discussed about Code status during rapid response  He remains full code      Type 2 diabetes mellitus, with long-term current use of insulin [facility])  Assessment & Plan  Lab Results   Component Value Date    HGBA1C 7 2 (H) 10/20/2022       Recent Labs     01/03/23  1614 01/03/23  2143 01/04/23  0554 01/04/23  0637   POCGLU 108 257* 67 102       Blood Sugar Average: Last 72 hrs:  (P) 146 75   · Continue home Lantus 18 unit nightly, lispro 5 unit 3 times daily AC  · Diabetic diet, Accu-Cheks    Hypertension with renal disease  Assessment & Plan  · BP stable over the last 24 hours  · Hold isosorbide mononitrate, Flomax, metoprolol for now      Discharging Physician / Practitioner: Shayy Reynaga MD  PCP: Junito Mane MD  Admission Date:   Admission Orders (From admission, onward)     Ordered        01/03/23 1642  Inpatient Admission  Once            01/02/23 1519  Place in Observation  Once                      Discharge Date: 01/04/23    Medical Problems Resolved Problems  Date Reviewed: 1/4/2023   None         Consultations During Hospital Stay:  • E-consult with urologist     Procedures Performed:   • None     Significant Findings / Test Results:   • XR ankle 3+ vw left Result Date: 1/3/2023  • Impression: No acute osseous abnormality  Workstation performed: [id number]      • CT head wo contrast Result Date: 1/2/2023  • Impression: No acute intracranial hemorrhage seen No mass effect or midline shift seen Workstation performed: [id number]      • CT spine cervical without contrast Result Date: 1/2/2023  • Impression: No acute compression collapse moderate central canal narrowing at C6/7 Multilevel facet joint disease with multilevel foraminal narrowing  Workstation performed: [id number]      Incidental Findings:   • See above      Test Results Pending at Discharge (will require follow up): • None     Outpatient Tests Requested:  • None     Complications:  None     Reason for Admission: symptomatic anemia     Hospital Course:      Jaskaran Valencia is a 76 y o  male patient with PMH of high-grade refractory bladder malignancy with hematuria, transfusion dependent anemia, HTN, CKD who originally presented to the hospital on 1/2/2023 due to symptomatic anemia  Initial BP was 90/60s which improved to 100/60s over IV fluid boluses  Currently his BP is 118/67 this morning  Isosorbide mononitrate, metoprolol, Flomax were hold due to hypotension  The work-ups including CT head, CT cervical spine, left ankle x-ray were negative  Patient received 2 pRBC transfusion and hemoglobin improved to 8 7  Currently hemoglobin stable at 8 5  Discussed with urologist regarding symptomatic anemia and ongoing hematuria  At this point, no intervention required and plan to follow-up in 10 weeks  Patient was found to be influenza A positive on screening however patient is asymptomatic  Patient remains afebrile over the last 24 hours, SPO2 99% on room air    Patient denies any symptoms  Patient is clinically  and hemodynamically stable to go back to [facility]       Please see above list of diagnoses and related plan for additional information  Condition at Discharge: poor     Discharge Day Visit / Exam:     Subjective:  Patient was seen and examined at bedside  The patient has no major complaints  He denies any fever, chills, cough, chest pain, palpitation, shortness of breath, N/V, abd pain  Vitals: Blood Pressure: 126/76 (01/04/23 0726)  Pulse: 73 (01/04/23 0726)  Temperature: (!) 96 4 °F (35 8 °C) (01/04/23 0726)  Temp Source: Temporal (01/04/23 0726)  Respirations: 18 (01/04/23 0726)  Height: 6' 4" (193 cm) (01/02/23 1620)  Weight - Scale: 96 kg (211 lb 10 3 oz) (01/02/23 1620)  SpO2: 100 % (01/04/23 0726)  Exam:   Physical Exam  General: breathing well on room air, no acute distress  HEENT: NC/AT, PERRL, EOM - normal  Neck: Supple  Pulm/Chest: Normal chest wall expansion, clear breath sounds on both side, no wheezing/rhonchi or crackles appreciated, pleurodesis catheter noted  CVS: RRR, normal S1&S2, no murmur appreciated, capillary refill <2s  Abd: soft, non tender, non distended, bowel sounds +, 2 PCNs noted  MSK: move all 4 extremities spontaneously  Skin: warm  CNS: no acute focal neuro deficit    Discussion with Family: Discussed with family member at bedside yesterday    Discharge instructions/Information to patient and family:   See after visit summary for information provided to patient and family  Provisions for Follow-Up Care:  See after visit summary for information related to follow-up care and any pertinent home health orders  Disposition:     [facility] at [address] to [facility] SNF:   · Not Applicable to this Patient - Not Applicable to this Patient    Planned Readmission: No     Discharge Statement:  I spent 45 minutes discharging the patient  This time was spent on the day of discharge   I had direct contact with the patient on the day of discharge  Greater than 50% of the total time was spent examining patient, answering all patient questions, arranging and discussing plan of care with patient as well as directly providing post-discharge instructions  Additional time then spent on discharge activities  Discharge Medications:  See after visit summary for reconciled discharge medications provided to patient and family        ** Please Note: This note has been constructed using a voice recognition system **

## 2023-01-04 NOTE — ASSESSMENT & PLAN NOTE
Lab Results   Component Value Date    HGBA1C 7 2 (H) 10/20/2022       Recent Labs     01/03/23  1614 01/03/23  2143 01/04/23  0554 01/04/23  0637   POCGLU 108 257* 67 102       Blood Sugar Average: Last 72 hrs:  (P) 146 75   · Continue home Lantus 18 unit nightly, lispro 5 unit 3 times daily AC  · Diabetic diet, Accu-Cheks

## 2023-01-04 NOTE — ASSESSMENT & PLAN NOTE
· Due to symptomatic anemia and hypotension  · Patient was on Isorbide mononitrate, Lasix 20 mg, Flomax 0 4 mg, metoprolol  · Will hold isosorbide mononitrate, metoprolol and Flomax for now  · BP is 126/76 this morning, stable over last 24 hours  · CT head & Spine - negative for acute abnormality other than multilevel facet joint disease with multilevel foraminal narrowing  · Left ankle x-ray showed no acute osseous abnormality

## 2023-01-05 ENCOUNTER — APPOINTMENT (OUTPATIENT)
Dept: RADIOLOGY | Facility: HOSPITAL | Age: 78
End: 2023-01-05

## 2023-01-05 ENCOUNTER — NURSING HOME VISIT (OUTPATIENT)
Dept: GERIATRICS | Facility: OTHER | Age: 78
End: 2023-01-05

## 2023-01-05 VITALS
SYSTOLIC BLOOD PRESSURE: 145 MMHG | BODY MASS INDEX: 24.9 KG/M2 | TEMPERATURE: 97.5 F | RESPIRATION RATE: 18 BRPM | HEART RATE: 90 BPM | OXYGEN SATURATION: 97 % | WEIGHT: 204.6 LBS | DIASTOLIC BLOOD PRESSURE: 83 MMHG

## 2023-01-05 DIAGNOSIS — R31.0 GROSS HEMATURIA: ICD-10-CM

## 2023-01-05 DIAGNOSIS — N18.4 STAGE 4 CHRONIC KIDNEY DISEASE (HCC): ICD-10-CM

## 2023-01-05 DIAGNOSIS — E11.22 TYPE 2 DIABETES MELLITUS WITH CHRONIC KIDNEY DISEASE, WITH LONG-TERM CURRENT USE OF INSULIN, UNSPECIFIED CKD STAGE (HCC): ICD-10-CM

## 2023-01-05 DIAGNOSIS — J10.1 INFLUENZA A: ICD-10-CM

## 2023-01-05 DIAGNOSIS — C67.3 MALIGNANT NEOPLASM OF ANTERIOR WALL OF URINARY BLADDER (HCC): ICD-10-CM

## 2023-01-05 DIAGNOSIS — Z79.4 TYPE 2 DIABETES MELLITUS WITH CHRONIC KIDNEY DISEASE, WITH LONG-TERM CURRENT USE OF INSULIN, UNSPECIFIED CKD STAGE (HCC): ICD-10-CM

## 2023-01-05 DIAGNOSIS — R26.2 AMBULATORY DYSFUNCTION: ICD-10-CM

## 2023-01-05 DIAGNOSIS — D64.9 SYMPTOMATIC ANEMIA: Primary | ICD-10-CM

## 2023-01-05 NOTE — PROGRESS NOTES
[facility]    History and Physical  POS: 31    Records Reviewed include: Hospital records      Chief Complaint/ Reason for Admission:   Symptomatic anemia    History of Present Illness:      Mr Riley Sykes is a 77 yo male who was recently admitted to [address] [place] [address] after he had a fall  She was hypotensive with symptomatic anemia he received 2 units PRBC while in the hospital   Currently  he is readmitted to [facility] transitional care unit for short-term rehab  At the time of encounter he denied any acute symptoms  No dizziness lightheadedness  Feels tired  Denies difficulty sleeping  States that his appetite is preserved  Continues to have hematuria and reports bladder spasms  No fever or chills  He has an intermittent dry cough , denies chest pain shortness of breath palpitation  States that his appetite is preserved  Denies abdominal pain nausea vomiting diarrhea or constipation  Of note during his hospital stay he tested positive for influenza A, continue isolation  Will check chest x-ray, repeat CBC CMP  Continue physical therapy and goal is to return home                     Allergies    Allergies   Allergen Reactions   • Atorvastatin Hives, Itching and Rash   • Simvastatin Rash and Edema     Edema of lower legs   • Statins Hives and Itching   • Insulin Lispro Swelling and Edema     " Lower Legs"   • Other Itching, Rash and Other (See Comments)     "EKG Patches"   "blue EKG patches"       Past Medical History  Past Medical History:   Diagnosis Date   • Anemia     Last assessed: 9/28/17   • Anxiety    • Arteriosclerotic cardiovascular disease     Last assessed: 9/28/17   • Arthritis    • [facility]     bladder- had BCG treatments   • Chronic kidney disease     Stage IV   • CKD (chronic kidney disease) stage 4, GFR 15-29 ml/min ([facility])    • Colon polyp    • Coronary artery disease     7 stents   • Depression    • Diabetes mellitus ([facility])     IDDM   • GERD (gastroesophageal reflux disease)    • Glaucoma    • Hematuria    • History of fusion of cervical spine    • Hyperlipidemia    • Hypertension    • Insomnia     Last assessed: 11/14/12   • Loss of hearing     has hearing aids but usually does not wear them   • Lung cancer [facility])    • Metastatic cancer ([facility] )    • Other seasonal allergic rhinitis     Last assessed: 2/10/16   • PAD (peripheral artery disease) ([facility])    • Shortness of breath     on exertion   • Spinal stenosis of lumbar region    • Transient cerebral ischemia     No Residual   • Uses walker     w/c for longer distances        Past Surgical History:   Procedure Laterality Date   • CARDIAC SURGERY      Cath stent placement  Last assessed: 3/9/17  Interventional Catheterization   • CHOLECYSTECTOMY     • COLONOSCOPY     • CYSTOSCOPY      Diagnostic w/biopsy  Carmelo Hobson  Last assessed: 12/1/14   • CYSTOSCOPY N/A 04/12/2022    Procedure: CYSTOSCOPY  Bladder biopsies  ;  Surgeon: Mando Ramires MD;  [facility];  Service: Urology   • CYSTOSCOPY W/ RETROGRADES Right 03/01/2022    Procedure: CYSTO; stent removal retrograde;  Surgeon: Mando Ramires MD;  [facility];  Service: Urology   • CYSTOSCOPY W/ URETERAL STENT PLACEMENT Bilateral 10/18/2021    Procedure: bilateral retrogrades, cytology collection;  Surgeon: Mando Ramires MD;  [facility];  Service: Urology   • CYSTOURETHROSCOPY      w/cautery  Carmelo Hobson   • FL RETROGRADE PYELOGRAM  10/18/2021   • FL RETROGRADE PYELOGRAM  10/24/2021   • FL RETROGRADE PYELOGRAM  12/14/2022   • GASTRIC BYPASS      For morbid obesity w/Shaji-en-Y   Resolved: 11/17/09   • INCISION AND DRAINAGE OF WOUND Right 02/26/2017    Procedure: INCISION AND DRAINAGE (I&D) EXTREMITY WITH APPLICATION OF GRAFT JACKET;  Surgeon: Yanely Mccray DPM;  [facility];  Service:    • INCISION AND DRAINAGE OF WOUND Right 04/25/2017    Procedure: INCISION AND DRAINAGE (I&D) EXTREMITY, APPLICATION OF GRAFT;  Surgeon: Tere Masters Nancy Bonilla DPM;  [facility];  Service:    • IR BIOPSY OTHER  07/02/2020   • IR LOWER EXTREMITY ANGIOGRAM  02/08/2021   • IR LOWER EXTREMITY ANGIOGRAM  02/11/2021   • IR NEPHROSTOMY TUBE CHECK/CHANGE/REPOSITION/REINSERTION/UPSIZE  04/28/2022   • IR NEPHROSTOMY TUBE CHECK/CHANGE/REPOSITION/REINSERTION/UPSIZE  05/24/2022   • IR NEPHROSTOMY TUBE CHECK/CHANGE/REPOSITION/REINSERTION/UPSIZE  06/07/2022   • IR NEPHROSTOMY TUBE CHECK/CHANGE/REPOSITION/REINSERTION/UPSIZE  07/28/2022   • IR NEPHROSTOMY TUBE CHECK/CHANGE/REPOSITION/REINSERTION/UPSIZE  11/15/2022   • IR NEPHROSTOMY TUBE PLACEMENT  02/25/2022   • IR PORT PLACEMENT  01/17/2022   • IR THORACENTESIS  09/02/2022   • IR THORACENTESIS  09/14/2022   • IR THORACENTESIS WITH TUBE PLACEMENT Left     october   • IR TUNNELED CENTRAL LINE PLACEMENT  12/24/2020   • JOINT REPLACEMENT      christofer knees replaced   • HI AMPUTATION METATARSAL W/TOE SINGLE Left 12/21/2020    Procedure: RAY RESECTION FOOT;  Surgeon: Earl Mcallister DPM;  [facility];  Service: Podiatry   • HI AMPUTATION METATARSAL W/TOE SINGLE Left 12/31/2020    Procedure: 5TH MET RESECTION;  Surgeon: Earl Mcallister DPM;  [facility];  Service: Podiatry   • HI CYSTO BLADDER W/URETERAL CATHETERIZATION Right 12/14/2022    Procedure: CYSTOSCOPY WITH RETROGRADE PYELOGRAM and CLOT EVACUATION, RIGHT STENT INSERTION, FULGRATION OF BLEEDING POINTS;  Surgeon: Eric Woodruff MD;  [facility];  Service: Urology   • HI CYSTO W/IRRIG & EVAC MULTPLE OBSTRUCTING CLOTS N/A 02/10/2021    Procedure: CYSTOSCOPY EVACUATION OF CLOTS, fulguration;  Surgeon: Gilda Smalls MD;  [facility];  Service: Urology   • HI CYSTO W/IRRIG & EVAC MULTPLE OBSTRUCTING CLOTS N/A 10/24/2021    Procedure: CYSTOSCOPY EVACUATION OF CLOT, fulguration of bleeding vessels, right ureter stent placement, retrograde pyelogram;  Surgeon: Jamal Arana MD;  [facility];  Service: Urology   • HI CYSTO W/REMOVAL OF LESIONS SMALL N/A 2020    Procedure: CYSTO W/BIOPSIES, transurethral prostate bx;  Surgeon: Nuzhat Briones MD;  [facility];  Service: Urology   • AZ CYSTOURETHROSCOPY W/DEST &/RMVL TUMOR LARGE Bilateral 10/18/2021    Procedure: TRANSURETHRAL RESECTION OF BLADDER TUMOR (TURBT);   Surgeon: Fabiano Jenkins MD;  [facility];  Service: Urology   • AZ CYSTOURETHROSCOPY WITH BIOPSY N/A 2016    Procedure: Chetna Ayers;  Surgeon: Tamir Lai MD;  [facility];  Service: Urology   • AZ Paulino Notch 3RD+ ORD [address] PEL/LXTR [facility] Left 2021    Procedure: LEG angiogram, CO2 w/limited contrast with balloon angioplasty postertior tibial artery;  Surgeon: June Interiano MD;  [facility];  Service: Vascular   • ROTATOR CUFF REPAIR     • SMALL INTESTINE SURGERY      Surgery Shaji-en-Y   • SPINAL FUSION      lumbar and cervical fusions   • VAC DRESSING APPLICATION Right     Procedure: APPLICATION VAC DRESSING;  Surgeon: Octavia Nina DPM;  [facility];  Service:    • WOUND DEBRIDEMENT Left 2021    Procedure: FOOT DEBRIDE, [address] OUT w/graft application;  Surgeon: Octavia Nina DPM;  [facility];  Service: Podiatry       Family History  Family History   Problem Relation Age of Onset   • Diabetes Mother    • Heart disease Mother    • Other Mother         High blood pressure   • Heart disease Father    • Diabetes Sister    • Other Sister         High blood pressure   • Kidney disease Sister    • Heart disease Brother    • Other Brother         High blood pressure       Social History  Social History     Tobacco Use   Smoking Status Former   • Packs/day: 3 00   • Years: 27 00   • Pack years: 81 00   • Types: Cigarettes   • Quit date: 5   • Years since quittin 0   Smokeless Tobacco Never      Social History     Substance and Sexual Activity   Alcohol Use Never    Comment: beer / liquor      Social History Substance and Sexual Activity   Drug Use Not Currently   • Types: Marijuana    Comment: quit 2019 had medical marijuana        Lives: Home,  Social Support: family  Fall in the past 12 months: yes  Use of assistance Device: Walker    Physical Exam    Vital Signs    Vitals:    01/05/23 1547   BP: 145/83   Pulse: 90   Resp: 18   Temp: 97 5 °F (36 4 °C)   SpO2: 97%           Constitutional: Frail appearing patient       Physical Exam  Vitals and nursing note reviewed  Constitutional:       General: He is not in acute distress  Appearance: He is not diaphoretic  HENT:      Head: Normocephalic and atraumatic  Mouth/Throat:      Mouth: Mucous membranes are dry  Eyes:      General:         Right eye: No discharge  Left eye: No discharge  Pupils: Pupils are equal, round, and reactive to light  Cardiovascular:      Rate and Rhythm: Normal rate and regular rhythm  Heart sounds: Normal heart sounds  No murmur heard  Pulmonary:      Effort: Pulmonary effort is normal  No respiratory distress  Breath sounds: Rales (b/l basis) present  No wheezing  Comments: pleurodesis   Abdominal:      Palpations: Abdomen is soft  Tenderness: There is no abdominal tenderness  There is no guarding or rebound  Genitourinary:     Comments: Nephrostomy tubes b/l  Musculoskeletal:         General: No tenderness  Cervical back: Normal range of motion and neck supple  Right lower leg: Edema (trace) present  Left lower leg: Edema (trace) present  Skin:     General: Skin is warm and dry  Neurological:      Mental Status: He is alert and oriented to person, place, and time  Mental status is at baseline  Psychiatric:         Behavior: Behavior normal          Review of Systems:  Review of Systems   Constitutional: Positive for fatigue  Negative for chills and fever  HENT: Negative for congestion, rhinorrhea and sore throat      Respiratory: Positive for cough (mild intermittent)  Negative for shortness of breath and wheezing  Cardiovascular: Negative for chest pain  Gastrointestinal: Negative for abdominal pain, constipation and nausea  Genitourinary: Positive for hematuria  Negative for dysuria  Nephrostomy tubes hematuria right side  Bladder spasm   Musculoskeletal: Positive for gait problem  Skin: Negative for rash and wound  Allergic/Immunologic: Negative for environmental allergies  Neurological: Negative for dizziness and syncope  Hematological: Does not bruise/bleed easily  Psychiatric/Behavioral: Negative for behavioral problems and sleep disturbance  List of Current Medications:    Medication reviewed  All orders signed  Complete list is in the paper chart  Allergies    Allergies   Allergen Reactions   • Atorvastatin Hives, Itching and Rash   • Simvastatin Rash and Edema     Edema of lower legs   • Statins Hives and Itching   • Insulin Lispro Swelling and Edema     " Lower Legs"   • Other Itching, Rash and Other (See Comments)     "EKG Patches"   "blue EKG patches"       Labs/Diagnostics (reviewed by this provider): I personally reviewed lab results and imaging studies  Full reports are in the paper chart  Assessment/Plan:    Symptomatic anemia  Patient received 2 units PRBC while in the hospital     hemoGlobin prior to admission to the facility 8 5    Continues to have ongoing hematuria due to bladder cancer  Patient refused blood work today  We will repeat CBC in the morning  Will place consult for hematology oncology  Transfuse if hemoglobin less than 7 0  Monitor vital signs closely    Hematuria  Patient with ongoing hematuria right nephrostomy tube  He has bilateral nephrostomy tubes-continue daily flushes  Follow-up with urology as needed, has follow-up appointment as outpatient in 10 weeks  Monitor closely  Resume tamsulosin due to reported bladder spasms    CKD (chronic kidney disease)  Lab Results   Component Value Date    EGFR 14 01/02/2023    EGFR 15 12/30/2022    EGFR 15 12/29/2022    CREATININE 3 66 (H) 01/02/2023    CREATININE 3 53 (H) 12/30/2022    CREATININE 3 62 (H) 12/29/2022     Creatinine stable  Will avoid nephrotoxic medication  Encourage po hydration  Will monitor BMP  Influenza A  Patient reports mild cough saturates well in room air remains afebrile  Will check chest x-ray  Monitor closely  Consider Tamiflu course  Continue isolation and provide supportive care  Courage out of bed as tolerated and continue physical therapy    Type 2 diabetes mellitus, with long-term current use of insulin ([facility])    Lab Results   Component Value Date    HGBA1C 7 2 (H) 10/20/2022     Well-controlled  Continue current insulin regimen and avoid hypoglycemia    Malignant neoplasm of anterior wall of urinary bladder ([facility] [address] )  With history of high-grade treatment refractory bladder cancer status post chemoradiation in 2021 currently on immunotherapy  Follow-up with urology and hematology oncology    Ambulatory dysfunction  Will continue PT/OT  Placed on fall precautions    The goal is to return home       Pain: no  Rehab Potential:Good  Patient Informed of Medical Condition: yes   Patient is Capable of Understanding Their Right: yes   Discharge Plan: home  Vaccination:   Immunization History   Administered Date(s) Administered   • COVID-19 PFIZER VACCINE 0 3 ML IM 02/26/2021, 03/18/2021, 08/28/2021   • COVID-19 Pfizer Vac BIVALENT Ravinder-sucrose 12 Yr+ IM (BOOSTER ONLY) 12/22/2022   • DT (pediatric) 01/24/2014   • DTaP 5 07/01/2007   • H1N1, All Formulations 12/16/2009   • INFLUENZA 10/12/2007, 10/06/2008, 11/01/2008, 09/14/2009, 09/30/2010, 10/01/2011, 10/09/2012, 11/10/2013, 12/01/2014, 01/26/2015, 01/23/2017, 09/28/2017, 10/15/2018, 01/04/2019, 10/29/2019, 11/01/2021, 10/14/2022   • Influenza Quadrivalent, 6-35 Months IM 11/14/2014   • Influenza Split High Dose Preservative Free IM 01/23/2017, 09/28/2017   • Influenza, Seasonal Vaccine, Quadrivalent, Adjuvanted,   5e 10/26/2022, 10/26/2022   • Influenza, high dose seasonal 0 7 mL 11/01/2018, 10/09/2020, 09/29/2021   • Influenza, seasonal, injectable 10/06/2011, 11/13/2013, 09/15/2015   • Influenza, seasonal, injectable, preservative free 09/23/2015   • Pneumococcal 01/01/2007, 10/01/2017   • Pneumococcal Conjugate 13-Valent 08/04/2015, 09/23/2015, 10/01/2017   • Pneumococcal Polysaccharide PPV23 03/01/2004, 11/12/2008, 07/10/2013, 09/15/2017   • Td (adult), Unspecified 09/30/2010   • Tdap 08/15/2014   • Tuberculin Skin Test-PPD Intradermal 05/08/2019       Code status:Full Code  PCP: MD Keyla Price MD  Geriatric Medicine  3/2/68865:46 PM

## 2023-01-05 NOTE — ASSESSMENT & PLAN NOTE
Lab Results   Component Value Date    HGBA1C 7 2 (H) 10/20/2022     Well-controlled  Continue current insulin regimen and avoid hypoglycemia

## 2023-01-05 NOTE — ASSESSMENT & PLAN NOTE
Patient received 2 units PRBC while in the hospital     hemoGlobin prior to admission to the facility 8 5    Continues to have ongoing hematuria due to bladder cancer  Patient refused blood work today  We will repeat CBC in the morning  Will place consult for hematology oncology  Transfuse if hemoglobin less than 7 0  Monitor vital signs closely

## 2023-01-05 NOTE — ASSESSMENT & PLAN NOTE
Patient reports mild cough saturates well in room air remains afebrile  Will check chest x-ray  Monitor closely  Consider Tamiflu course  Continue isolation and provide supportive care  Courage out of bed as tolerated and continue physical therapy

## 2023-01-05 NOTE — ASSESSMENT & PLAN NOTE
Lab Results   Component Value Date    EGFR 14 01/02/2023    EGFR 15 12/30/2022    EGFR 15 12/29/2022    CREATININE 3 66 (H) 01/02/2023    CREATININE 3 53 (H) 12/30/2022    CREATININE 3 62 (H) 12/29/2022     Creatinine stable  Will avoid nephrotoxic medication  Encourage po hydration  Will monitor BMP

## 2023-01-05 NOTE — ASSESSMENT & PLAN NOTE
Patient with ongoing hematuria right nephrostomy tube  He has bilateral nephrostomy tubes-continue daily flushes  Follow-up with urology as needed, has follow-up appointment as outpatient in 10 weeks  Monitor closely  Resume tamsulosin due to reported bladder spasms

## 2023-01-05 NOTE — ASSESSMENT & PLAN NOTE
With history of high-grade treatment refractory bladder cancer status post chemoradiation in 2021 currently on immunotherapy  Follow-up with urology and hematology oncology

## 2023-01-06 ENCOUNTER — NURSING HOME VISIT (OUTPATIENT)
Dept: GERIATRICS | Facility: OTHER | Age: 78
End: 2023-01-06

## 2023-01-06 DIAGNOSIS — Z79.4 TYPE 2 DIABETES MELLITUS WITH STAGE 3 CHRONIC KIDNEY DISEASE, WITH LONG-TERM CURRENT USE OF INSULIN, UNSPECIFIED WHETHER STAGE 3A OR 3B CKD (HCC): ICD-10-CM

## 2023-01-06 DIAGNOSIS — D64.9 SYMPTOMATIC ANEMIA: ICD-10-CM

## 2023-01-06 DIAGNOSIS — M51.36 DEGENERATION OF LUMBAR INTERVERTEBRAL DISC: ICD-10-CM

## 2023-01-06 DIAGNOSIS — N18.30 TYPE 2 DIABETES MELLITUS WITH STAGE 3 CHRONIC KIDNEY DISEASE, WITH LONG-TERM CURRENT USE OF INSULIN, UNSPECIFIED WHETHER STAGE 3A OR 3B CKD (HCC): ICD-10-CM

## 2023-01-06 DIAGNOSIS — M47.816 LUMBAR SPONDYLOSIS: ICD-10-CM

## 2023-01-06 DIAGNOSIS — E11.22 TYPE 2 DIABETES MELLITUS WITH STAGE 3 CHRONIC KIDNEY DISEASE, WITH LONG-TERM CURRENT USE OF INSULIN, UNSPECIFIED WHETHER STAGE 3A OR 3B CKD (HCC): ICD-10-CM

## 2023-01-06 DIAGNOSIS — R79.89 ELEVATED BRAIN NATRIURETIC PEPTIDE (BNP) LEVEL: ICD-10-CM

## 2023-01-06 DIAGNOSIS — J10.1 INFLUENZA A: Primary | ICD-10-CM

## 2023-01-06 DIAGNOSIS — I95.1 ORTHOSTATIC HYPOTENSION: ICD-10-CM

## 2023-01-06 DIAGNOSIS — R00.0 TACHYCARDIA: ICD-10-CM

## 2023-01-06 DIAGNOSIS — M54.16 LUMBAR RADICULOPATHY: ICD-10-CM

## 2023-01-06 LAB
ATRIAL RATE: 118 BPM
P AXIS: 8 DEGREES
PR INTERVAL: 168 MS
QRS AXIS: -10 DEGREES
QRSD INTERVAL: 96 MS
QT INTERVAL: 312 MS
QTC INTERVAL: 437 MS
T WAVE AXIS: 115 DEGREES
VENTRICULAR RATE: 118 BPM

## 2023-01-06 RX ORDER — DULOXETIN HYDROCHLORIDE 30 MG/1
CAPSULE, DELAYED RELEASE ORAL
Qty: 90 CAPSULE | Refills: 0 | Status: ON HOLD | OUTPATIENT
Start: 2023-01-06

## 2023-01-06 RX ORDER — GABAPENTIN 300 MG/1
CAPSULE ORAL
Qty: 90 CAPSULE | Refills: 0 | Status: ON HOLD | OUTPATIENT
Start: 2023-01-06

## 2023-01-06 NOTE — ASSESSMENT & PLAN NOTE
Most likely sinus tachycardia in context of being febrile  Encourage p o  hydration  Resume metoprolol 25 mg twice daily with holding parameters  Will check EKG  Monitor electrolytes

## 2023-01-06 NOTE — CONSULTS
ENCOUNTER DATE: 01/06/23 6:01 PM  PATIENT NAME: Carlitos Cruz   1945    [id number]  Age: 76 y o  Sex: male  [address]: Letty Oneal MD  INPATIENT ATTENDING PHYSICIAN: Tuyet Davison MD; [facility] PHYSICIAN: Leigh Helms MD  DATE OF ADMISSION: 1/4/2023  1:05 PM; LENGTH OF STAY: 2 days  *-*-*-*-*-*-*-*-*-*-*-*-*-*-*-*-*-*-*-*-*-*-*-*-*-*-*-*-*-*-*-*-*-*-*-*-*-*-*-*-*-*-*-*-*-*-*-*-*-*-*-*-*-*-   REASON FOR CONSULTATION:   Tachycardia  *-*-*-*-*-*-*-*-*-*-*-*-*-*-*-*-*-*-*-*-*-*-*-*-*-*-*-*-*-*-*-*-*-*-*-*-*-*-*-*-*-*-*-*-*-*-*-*-*-*-*-*-*-*-  CARDIAC ASSESSMENT:     1  Sinus tachycardia  2  Influenza A  3  Symptomatic anemia  4  Metastatic bladder and neurological cancer  5   Coronary artery disease, history of PCI, without angina       Patient Active Problem List   Diagnosis   • Coronary artery disease of native artery of native heart with stable angina pectoris ([facility] [address] )   • Bl[facility]   • Hypertension with renal disease   • Hyperlipidemia   • Presence of stent in coronary artery   • Type 2 diabetes mellitus, with long-term current use of insulin (HCC)   • Primary osteoarthritis of left knee   • Cystitis due to intravesical BCG administration   • Degeneration of lumbar intervertebral disc   • Back pain   • Arteriosclerosis of artery of extremity (HCC)   • Stable angina pectoris (HCC)   • Herpes zoster without complication   • Localized edema   • Superficial phlebitis   • Preop examination   • Acute renal failure (ARF) (HCC)   • Secondary renal hyperparathyroidism (HCC)   • 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)   • Abnormal MRI, lumbar spine   • Diabetic polyneuropathy associated with type 2 diabetes mellitus ([address] )   • Dysuria   • Diabetic ulcer of left foot associated with type 2 diabetes mellitus, 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 ([address] )   • Acute kidney injury superimposed on CKD (HCC)   • Symptomatic anemia   • Anemia of chronic disease   • Preoperative clearance   • PAD (peripheral artery disease) ([facility])   • Left carotid bruit   • Gross hematuria   • Chronic bronchitis ([facility])   • Moderate major depression, single episode ([facility])   • Thrombocytopenia ([facility])   • 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   • Lumbar spondylosis   • Chronic pain syndrome   • Acute urinary retention   • Hematuria   • Continuous opioid dependence ([facility] [address] )   • Port-A-Cath in place   • 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[facility])   • Embolism and 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 ([facility])   • Abnormal CT scan, bladder   • Retained ureteral stent   • Fall   • Hyperkalemia   • CKD (chronic kidney disease)   • Bilateral hydronephrosis   • Cancer related pain   • Syncope and collapse   • Depression with suicidal ideation   • Pulmonary nodules   • Retroperitoneal lymphadenopathy   • SOB (shortness of breath)   • History of tobacco use disorder   • Recurrent left pleural effusion   • UTI (urinary tract infection)   • Right wrist drop   • Insomnia   • Right sided weakness   • Stroke-like symptoms   • Orthostatic hypotension   • Elevated troponin   • Influenza A   • Ambulatory dysfunction   • Tachycardia   • Elevated brain natriuretic peptide (BNP) level        Patient sinus tachycardia is likely secondary to his influenza and symptomatic anemia and may also be secondary to holding beta-blocker therapy  Heart rate is currently better controlled after reinitiation of beta-blocker therapy  His ECG this morning did not identify changes of ischemia  His hemoglobin and hematocrit is improved compared to 1/4/2023  He has progressive obstructive uropathy  CARDIAC PLAN:     -- Recommend gradually increasing the dose of metoprolol tartrate to 37 5 mg twice daily and subsequently to 50 mg twice daily if his blood pressure would allow  -- Agree with compression stockings during daytime to augment blood pressure  -- Continue other medications     -- Recommend maintaining good hydration   -- Considering his extensive coronary artery disease recommend maintaining hemoglobin greater than 8 at all times  --Avoid large fluid boluses  --If he develops symptoms of angina pectoris recommend doing an ECG and checking cardiac enzymes and hemoglobin and hematocrit  Echocardiogram  can be considered in that case  Cardiology  will follow patient peripherally as needed  Please reach out to cardiologist rounding at the campus if there is any change in clinical status or if there are any questions or concerns  *-*-*-*-*-*-*-*-*-*-*-*-*-*-*-*-*-*-*-*-*-*-*-*-*-*-*-*-*-*-*-*-*-*-*-*-*-*-*-*-*-*-*-*-*-*-*-*-*-*-*-*-*-*-  HISTORY OF PRESENT ILLNESS     Patient is a 64-year-old gentleman with medical history significant for:  1  Metastatic high-grade urolithiasis/bladder carcinoma, currently receiving palliative chemotherapy, status post bilateral nephrostomy tube placement for gross hematuria  2  Stage IV CKD secondary to obstructive uropathy and right-sided hydronephrosis, status post right ureteral stent placement  3  Anemia due to CKD and blood loss  4  Hypertension secondary to renal disease  5  Chronic pain syndrome  6  Moderate major depression  7  Diabetes mellitus  8  Coronary artery disease without angina, history of PCI in the past  9  Dyslipidemia  10  Degenerative joint disease with spinal stenosis  11  TIA    In the last couple of months patient has had recurrent admissions with gross hematuria and symptomatic anemia  During the admission she has been noted to have recurrent UTI and acute kidney injury superimposed over CKD  His most recent admission was between December 12 and December 22, 2022  He has had multiple blood transfusions and urologic interventions  He was eventually discharged to transitional care facility for subacute rehab    On 1/2/2023 he had a brief period of unresponsiveness and low blood pressure and he was admitted to hospital   His blood pressure improved after holding his cardiac regimen including diuretics and nitrates and metoprolol  He is now back in transitional care facility and has been noted to be tachycardic  ECG shows sinus tachycardia  From a symptom perspective he has had no chest pain or shortness of breath or palpitations  He has significant fatigue and some ambulatory dysfunction  He denies orthopnea PND or pedal edema  He has been started on low-dose beta-blocker therapy this morning and his heart rate is better now  He has been diagnosed with influenza A and has been started treatment with Tamiflu          His last echocardiogram was in December 2020 and demonstrated EF of 60%  *-*-*-*-*-*-*-*-*-*-*-*-*-*-*-*-*-*-*-*-*-*-*-*-*-*-*-*-*-*-*-*-*-*-*-*-*-*-*-*-*-*-*-*-*-*-*-*-*-*-*-*-*-*  PAST MEDICAL HISTORY:     Past Medical History:   Diagnosis Date   • Anemia     Last assessed: 9/28/17   • Anxiety    • Arteriosclerotic cardiovascular disease     Last assessed: 9/28/17   • Arthritis    • Bladder cancer ([place] [address] )     bladder- had BCG treatments   • Chronic kidney disease     Stage IV   • CKD (chronic kidney disease) stage 4, GFR 15-29 ml/min ([facility])    • Colon polyp    • Coronary artery disease     7 stents   • Depression    • Diabetes mellitus ([facility])     IDDM   • GERD (gastroesophageal reflux disease)    • Glaucoma    • Hematuria    • History of fusion of cervical spine    • Hyperlipidemia    • Hypertension    • Insomnia     Last assessed: 11/14/12   • Loss of hearing     has hearing aids but usually does not wear them   • Lung cancer ([place] [address] )    • Metastatic cancer ([place] [address] )    • Other seasonal allergic rhinitis     Last assessed: 2/10/16   • PAD (peripheral artery disease) ([facility])    • Shortness of breath     on exertion   • Spinal stenosis of lumbar region    • Transient cerebral ischemia     No Residual   • Uses walker     w/c for longer distances    PAST SURGICAL HISTORY     Past Surgical History:   Procedure Laterality Date   • CARDIAC SURGERY      Cath stent placement  Last assessed: 3/9/17  Interventional Catheterization   • CHOLECYSTECTOMY     • COLONOSCOPY     • CYSTOSCOPY      Diagnostic w/biopsy  Jared David  Last assessed: 12/1/14   • CYSTOSCOPY N/A 04/12/2022    Procedure: CYSTOSCOPY  Bladder biopsies  ;  Surgeon: Heavenly Boland MD;  [facility];  Service: Urology   • CYSTOSCOPY W/ RETROGRADES Right 03/01/2022    Procedure: CYSTO; stent removal retrograde;  Surgeon: Heavenly Boland MD;  [facility];  Service: Urology   • CYSTOSCOPY W/ URETERAL STENT PLACEMENT Bilateral 10/18/2021    Procedure: bilateral retrogrades, cytology collection;  Surgeon: Heavenly Boland MD;  [facility];  Service: Urology   • CYSTOURETHROSCOPY      w/cautery  Jared Hernandez   • FL RETROGRADE PYELOGRAM  10/18/2021   • FL RETROGRADE PYELOGRAM  10/24/2021   • FL RETROGRADE PYELOGRAM  12/14/2022   • GASTRIC BYPASS      For morbid obesity w/Shaji-en-Y   Resolved: 11/17/09   • INCISION AND DRAINAGE OF WOUND Right 02/26/2017    Procedure: INCISION AND DRAINAGE (I&D) EXTREMITY WITH APPLICATION OF GRAFT JACKET;  Surgeon: Ashley Banuelos DPM;  [facility];  Service:    • INCISION AND DRAINAGE OF WOUND Right 04/25/2017    Procedure: INCISION AND DRAINAGE (I&D) EXTREMITY, APPLICATION OF GRAFT;  Surgeon: Ashley Banuelos DPM;  [facility];  Service:    • IR BIOPSY OTHER  07/02/2020   • IR LOWER EXTREMITY ANGIOGRAM  02/08/2021   • IR LOWER EXTREMITY ANGIOGRAM  02/11/2021   • IR NEPHROSTOMY TUBE CHECK/CHANGE/REPOSITION/REINSERTION/UPSIZE  04/28/2022   • IR NEPHROSTOMY TUBE CHECK/CHANGE/REPOSITION/REINSERTION/UPSIZE  05/24/2022   • IR NEPHROSTOMY TUBE CHECK/CHANGE/REPOSITION/REINSERTION/UPSIZE  06/07/2022   • IR NEPHROSTOMY TUBE CHECK/CHANGE/REPOSITION/REINSERTION/UPSIZE  07/28/2022   • IR NEPHROSTOMY TUBE CHECK/CHANGE/REPOSITION/REINSERTION/UPSIZE  11/15/2022   • IR NEPHROSTOMY TUBE PLACEMENT  02/25/2022   • IR PORT PLACEMENT  01/17/2022   • IR THORACENTESIS  09/02/2022   • IR THORACENTESIS  09/14/2022   • IR THORACENTESIS WITH TUBE PLACEMENT Left     october   • IR TUNNELED CENTRAL LINE PLACEMENT  12/24/2020   • JOINT REPLACEMENT      christofer knees replaced   • TX AMPUTATION METATARSAL W/TOE SINGLE Left 12/21/2020    Procedure: RAY RESECTION FOOT;  Surgeon: Barbara Almaguer DPM;  [facility];  Service: Podiatry   • TX AMPUTATION METATARSAL W/TOE SINGLE Left 12/31/2020    Procedure: 5TH MET RESECTION;  Surgeon: Barbara Almaguer DPM;  [facility];  Service: Podiatry   • TX CYSTO BLADDER W/URETERAL CATHETERIZATION Right 12/14/2022    Procedure: CYSTOSCOPY WITH RETROGRADE PYELOGRAM and CLOT EVACUATION, RIGHT STENT INSERTION, FULGRATION OF BLEEDING POINTS;  Surgeon: Julitea Fletcher MD;  [facility];  Service: Urology   • TX CYSTO W/IRRIG & EVAC MULTPLE OBSTRUCTING CLOTS N/A 02/10/2021    Procedure: CYSTOSCOPY EVACUATION OF CLOTS, fulguration;  Surgeon: Deanna Balderas MD;  [facility];  Service: Urology   • TX CYSTO W/IRRIG & EVAC MULTPLE OBSTRUCTING CLOTS N/A 10/24/2021    Procedure: CYSTOSCOPY EVACUATION OF CLOT, fulguration of bleeding vessels, right ureter stent placement, retrograde pyelogram;  Surgeon: Alexy Montgomery MD;  [facility];  Service: Urology   • TX CYSTO W/REMOVAL OF LESIONS SMALL N/A 11/19/2020    Procedure: CYSTO W/BIOPSIES, transurethral prostate bx;  Surgeon: Alice Haas MD;  [facility];  Service: Urology   • TX CYSTOURETHROSCOPY W/DEST &/RMVL TUMOR LARGE Bilateral 10/18/2021    Procedure: TRANSURETHRAL RESECTION OF BLADDER TUMOR (TURBT);   Surgeon: Alannah Lowe MD;  [facility];  Service: Urology   • TX CYSTOURETHROSCOPY WITH BIOPSY N/A 08/16/2016    Procedure: Danelle Barfield;  Surgeon: Prescott Leyden, MD;  [facility];  Service: Urology   • TX Yash Burnham 3RD+ ORD [address] PEL/LXTR [facility] Left 02/08/2021    Procedure: LEG angiogram, CO2 w/limited contrast with balloon angioplasty postertior tibial artery;  Surgeon: June Interiano MD;  [facility];  Service: Vascular   • ROTATOR CUFF REPAIR     • SMALL INTESTINE SURGERY      Surgery Shaji-en-Y   • SPINAL FUSION      lumbar and cervical fusions   • VAC DRESSING APPLICATION Right     Procedure: APPLICATION VAC DRESSING;  Surgeon: Octavia Nina DPM;  [facility];  Service:    • WOUND DEBRIDEMENT Left 2021    Procedure: FOOT DEBRIDE, [address] OUT w/graft application;  Surgeon: Octavia Nina DPM;  [facility];  Service: Podiatry          FAMILY HISTORY     Family History   Problem Relation Age of Onset   • Diabetes Mother    • Heart disease Mother    • Other Mother         High blood pressure   • Heart disease Father    • Diabetes Sister    • Other Sister         High blood pressure   • Kidney disease Sister    • Heart disease Brother    • Other Brother         High blood pressure     SOCIAL HISTORY     Social History     Tobacco Use   Smoking Status Former   • Packs/day: 3 00   • Years: 27 00   • Pack years: 81 00   • Types: Cigarettes   • Quit date: 5   • Years since quittin 0   Smokeless Tobacco Never      Social History     Substance and Sexual Activity   Alcohol Use Never    Comment: beer / liquor     Social History     Substance and Sexual Activity   Drug Use Not Currently   • Types: Marijuana    Comment: quit 2019 had medical marijuana    @[facility]@     *-*-*-*-*-*-*-*-*-*-*-*-*-*-*-*-*-*-*-*-*-*-*-*-*-*-*-*-*-*-*-*-*-*-*-*-*-*-*-*-*-*-*-*-*-*-*-*-*-*-*-*-*-*  ALLERGIES     Allergies   Allergen Reactions   • Atorvastatin Hives, Itching and Rash   • Simvastatin Rash and Edema     Edema of lower legs   • Statins Hives and Itching   • Insulin Lispro Swelling and Edema     " Lower Legs"   • Other Itching, Rash and Other (See Comments)     "EKG Patches"   "blue EKG patches"     CURRENT SCHEDULED MEDICATIONS     No current facility-administered medications for this encounter  *-*-*-*-*-*-*-*-*-*-*-*-*-*-*-*-*-*-*-*-*-*-*-*-*-*-*-*-*-*-*-*-*-*-*-*-*-*-*-*-*-*-*-*-*-*-*-*-*-*-*-*-*-*   REVIEW OF SYSTEMS     Positive for:  As noted above  Negative for: All remaining as reviewed below and in HPI  SYSTEM SYMPTOMS REVIEWED:  General--weight change, fever, night sweats  Respiratoryl-- Wheezing, shortness of breath, cough, URI symptoms, sputum, blood  Cardiovascular--chest pain, syncope, dyspnea on exertion, edema, decline in exercise tolerance, claudication   Gastrointestinal--persistent vomiting, diarrhea, abdominal distention, blood in stool   Muscular or skeletal--joint pain or swelling   Neurologic--headaches, syncope, abnormal movement  Hematologic--history of easy bruising and bleeding   Endocrine--thyroid enlargement, heat or cold intolerance, polyuria   Psychiatric--anxiety, depression      *-*-*-*-*-*-*-*-*-*-*-*-*-*-*-*-*-*-*-*-*-*-*-*-*-*-*-*-*-*-*-*-*-*-*-*-*-*-*-*-*-*-*-*-*-*-*-*-*-*-*-*-*-*-   VITAL SIGNS               Wt Readings from Last 10 Encounters:   01/05/23 92 8 kg (204 lb 9 6 oz)   01/02/23 96 kg (211 lb 10 3 oz)   12/22/22 91 4 kg (201 lb 8 oz)   12/02/22 96 kg (211 lb 10 3 oz)   11/30/22 97 1 kg (214 lb)   11/25/22 96 kg (211 lb 10 3 oz)   11/22/22 96 4 kg (212 lb 8 oz)   11/22/22 92 kg (202 lb 13 2 oz)   11/17/22 92 kg (202 lb 13 2 oz)   11/15/22 95 7 kg (211 lb)    No intake or output data in the 24 hours ending 01/06/23 1801     *-*-*-*-*-*-*-*-*-*-*-*-*-*-*-*-*-*-*-*-*-*-*-*-*-*-*-*-*-*-*-*-*-*-*-*-*-*-*-*-*-*-*-*-*-*-*-*-*-*-*-*-*-*-   PHYSICAL EXAMINATION:     General Appearance:    Alert, cooperative, no distress, appears stated age    Head, Eyes, ENT:     Conjunctival pallor, protein calorie malnutrition, moist mucous mebranes  Neck:   Supple, no carotid bruit or JVD   Back:     Symmetric, no curvature  Lungs:     Respirations unlabored   Clear to auscultation bilaterally,    Chest wall:    No tenderness or deformity   Heart:    Regular rate and rhythm, S1 and S2 normal, no murmur, rub  or gallop  Abdomen:     Soft, non-tender, blood-tinged with urine collection   Extremities:   Extremities warm, no cyanosis or edema    Skin:   Skin color, texture, turgor normal, no rashes or lesions     *-*-*-*-*-*-*-*-*-*-*-*-*-*-*-*-*-*-*-*-*-*-*-*-*-*-*-*-*-*-*-*-*-*-*-*-*-*-*-*-*-*-*-*-*-*-*-*-*-*-*-*-*-*-   LABORATORY DATA:   I have personally reviewed pertinent labs  CMP:  Results from last 7 days   Lab Units 01/06/23  0704 01/02/23  1340   SODIUM mmol/L 137 136   POTASSIUM mmol/L 4 5 4 7   CHLORIDE mmol/L 103 102   CO2 mmol/L 27 24   BUN mg/dL 51* 68*   CREATININE mg/dL 3 07* 3 66*   CALCIUM mg/dL 8 6 8 7   ALK PHOS U/L 106 89   ALT U/L 29 26   AST U/L 42 35        Results from last 7 days   Lab Units 01/02/23  1345   MAGNESIUM mg/dL 2 0     Results from last 7 days   Lab Units 01/02/23  1345   PHOSPHORUS mg/dL 5 1*    Cardiac Profile:   Results from last 7 days   Lab Units 01/06/23  1116 01/06/23  0704 01/02/23  1954 01/02/23  1748 01/02/23  1548 01/02/23  1345   HS TNI RAND ng/L 17  --   --   --   --  30*   HS TNI 0HR ng/L  --   --   --   --  29  --    HS TNI 2HR ng/L  --   --   --  29  --   --    HS TNI 4HR ng/L  --   --  28  --   --   --    NT-PRO BNP pg/mL  --  2,720*  --   --   --   --                     Blood Gas Analysis:             CBC:   Results from last 7 days   Lab Units 01/06/23  0704 01/04/23  0447 01/03/23  1101 01/03/23  0225 01/02/23  1340   WBC Thousand/uL 8 57 10 39*  --   --  9 14   HEMOGLOBIN g/dL 9 4* 8 5* 8 7*   < > 7 2*   HEMATOCRIT % 30 1* 26 1* 27 8*   < > 23 6*   PLATELETS Thousands/uL 155 160  --   --  209    < > = values in this interval not displayed  PT/INR: No results found for: PT, INR, Microbiology:            *-*-*-*-*-*-*-*-*-*-*-*-*-*-*-*-*-*-*-*-*-*-*-*-*-*-*-*-*-*-*-*-*-*-*-*-*-*-*-*-*-*-*-*-*-*-*-*-*-*-*-*-*-*-  TELEMETRY, LAST ECG:    Telemetry reviewed    As noted above       Results for orders placed or performed during the hospital encounter of 01/04/23   ECG 12 lead   Result Value    Ventricular Rate 118    Atrial Rate 118    IN Interval 168    QRSD Interval 96    QT Interval 312    QTC Interval 437    P Axis 8    QRS Axis -10    T Wave Axis 115    Narrative    Sinus tachycardia with occasional Premature ventricular complexes  Anterior infarct , age undetermined  Nonspecific intraventricular conduction delay  ST & T wave abnormality, consider lateral ischemia  Abnormal ECG  Compared to previous tracing from January 2,2023 13:39 hours anterior infarction and intraventricular conduction delay are new  Confirmed by Letty Oneal ([id number]) on 1/6/2023 5:19:15 PM        *-*-*-*-*-*-*-*-*-*-*-*-*-*-*-*-*-*-*-*-*-*-*-*-*-*-*-*-*-*-*-*-*-*-*-*-*-*-*-*-*-*-*-*-*-*-*-*-*-*-*-*-*-*-  IMAGING STUDIES REPORTS: Imaging studies results reviewed    No valid procedures specified  XR chest pa & lateral    Result Date: 1/6/2023  Impression No significant change since prior study 12/30/2022  Workstation performed: [id number]     XR chest pa & lateral    Result Date: 12/30/2022  Impression Decreasing lateral left costophrenic angle effusion with overlying basilar atelectasis  Workstation performed: [id number]     XR chest 2 views    Result Date: 11/17/2022  Impression Trace left pleural effusion  Workstation performed: [id number]     XR chest pa & lateral    Result Date: 9/13/2022  Impression Persistent large left pleural effusion  Workstation performed: [id number]       *-*-*-*-*-*-*-*-*-*-*-*-*-*-*-*-*-*-*-*-*-*-*-*-*-*-*-*-*-*-*-*-*-*-*-*-*-*-*-*-*-*-*-*-*-*-*-*-*-*-*-*-*-*-  AVAILABLE OLD CARDIAC TESTS REPORTS:   No results found for this or any previous visit  No results found for this or any previous visit  No results found for this or any previous visit  No results found for this or any previous visit  *-*-*-*-*-*-*-*-*-*-*-*-*-*-*-*-*-*-*-*-*-*-*-*-*-*-*-*-*-*-*-*-*-*-*-*-*-*-*-*-*-*-*-*-*-*-*-*-*-*-*-*-*-*-  SIGNATURES:   @[facility]@   Christal Duron MD     CC:   Janna Teran MD   No ref   provider found

## 2023-01-06 NOTE — ASSESSMENT & PLAN NOTE
BNP trending up, today at 2720  Clinically patient does not look fluid overload  Denies chest pain, shortness of breath seems to be at baseline, he has trace lower extremity edema improved from prior    Weight is trending down  Continue Lasix 20 mg daily  Check troponin  Consulted cardiology

## 2023-01-06 NOTE — PROGRESS NOTES
[address]  [phone]  [facility] of Service: nursing home place of service: POS [address] A Coverage      NAME: Geovanna Simmons  AGE: 76 y o  SEX: male [phone]    DATE OF ENCOUNTER: 1/6/2023    Assessment and Plan     Problem List Items Addressed This Visit        Respiratory    Influenza A - Primary     Patient reports mild cough, spiked a fever of 101 Fahrenheit this morning ,saturates well in room air  Chest x-ray results pending  Monitor closely  Start Tamiflu 30 mg daily for 5 days  Continue isolation and provide supportive care  Encourage out of bed as tolerated and continue physical therapy  Encourage p o  intake            Cardiovascular and Mediastinum    Orthostatic hypotension     Continues to be orthostatic  Will encourage p o  hydration  Monitor closely  Resume metoprolol 25 mg twice a day  Apply compression stockings  Consult cardiology            Other    Symptomatic anemia     Hemoglobin improved, today 9 4  Continue to monitor CBC  Transfuse if hemoglobin less than 7 0  Consulted heme-onc         Tachycardia     Most likely sinus tachycardia in context of being febrile  Encourage p o  hydration  Resume metoprolol 25 mg twice daily with holding parameters  Will check EKG  Monitor electrolytes         Elevated brain natriuretic peptide (BNP) level     BNP trending up, today at 2720  Clinically patient does not look fluid overload  Denies chest pain, shortness of breath seems to be at baseline, he has trace lower extremity edema improved from prior  Weight is trending down  Continue Lasix 20 mg daily  Check troponin  Consulted cardiology                Chief Complaint     Follow up     History of Present Illness     Geovanna Simmons is a 76 y o  male who was seen today for follow up  Patient seen and examined at bedside  Reports decreased appetite, continues to have a cough  Denies chest pain    Shortness of breath at baseline only with exertion  Denies dizziness lightheadedness at the time of encounter  Denies fever chills, ever noted to have a temperature of 101 Fahrenheit this morning  No abdominal pain nausea vomiting diarrhea or constipation  Continues to have hematuria  The following portions of the patient's history were reviewed and updated as appropriate: allergies, current medications, past family history, past medical history, past social history, past surgical history and problem list     Review of Systems     Review of Systems   Constitutional: Positive for appetite change  Negative for chills, fatigue and fever  HENT: Negative for congestion, rhinorrhea and sore throat  Respiratory: Positive for cough and shortness of breath (with exertion, at baseline)  Negative for wheezing  Cardiovascular: Negative for chest pain  Gastrointestinal: Negative for abdominal pain, constipation and nausea  Genitourinary: Positive for hematuria  Negative for dysuria  Nephrostomy tubes b/l   Musculoskeletal: Positive for gait problem  Skin: Negative for rash and wound  Allergic/Immunologic: Negative for environmental allergies  Neurological: Negative for dizziness and syncope  Hematological: Does not bruise/bleed easily  Psychiatric/Behavioral: Negative for behavioral problems and sleep disturbance         Active Problem List     Patient Active Problem List   Diagnosis   • Coronary artery disease of native artery of native heart with stable angina pectoris ([facility] [address] )   • Bl[facility]   • Hypertension with renal disease   • Hyperlipidemia   • Presence of stent in coronary artery   • Type 2 diabetes mellitus, with long-term current use of insulin ([facility])   • Primary osteoarthritis of left knee   • Cystitis due to intravesical BCG administration   • Degeneration of lumbar intervertebral disc   • Back pain   • Arteriosclerosis of artery of extremity ([facility])   • Stable angina pectoris [facility])   • Herpes zoster without complication   • Localized edema   • Superficial phlebitis   • Preop examination   • Acute renal failure (ARF) (HCC)   • Secondary renal hyperparathyroidism (HCC)   • 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)   • Abnormal MRI, lumbar spine   • Diabetic polyneuropathy associated with type 2 diabetes mellitus ([facility] [address] )   • Dysuria   • Diabetic ulcer of left foot associated with type 2 diabetes mellitus, 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 ([facility] [address] )   • Acute kidney injury superimposed on CKD ([facility] [address] [address] )   • Symptomatic anemia   • Anemia of chronic disease   • Preoperative clearance   • PAD (peripheral artery disease) ([facility])   • Left carotid bruit   • Gross hematuria   • Chronic bronchitis ([facility])   • Moderate major depression, single episode ([facility])   • Thrombocytopenia ([facility])   • 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   • Lumbar spondylosis   • Chronic pain syndrome   • Acute urinary retention   • Hematuria   • Continuous opioid dependence ([facility] [address] )   • Port-A-Cath in place   • 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[facility])   • Embolism and 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 ([facility])   • Abnormal CT scan, bladder   • Retained ureteral stent   • Fall   • Hyperkalemia   • CKD (chronic kidney disease)   • Bilateral hydronephrosis   • Cancer related pain   • Syncope and collapse   • Depression with suicidal ideation   • Pulmonary nodules   • Retroperitoneal lymphadenopathy   • SOB (shortness of breath)   • History of tobacco use disorder   • Recurrent left pleural effusion   • UTI (urinary tract infection)   • Right wrist drop   • Insomnia   • Right sided weakness   • Stroke-like symptoms   • Orthostatic hypotension   • Elevated troponin   • Influenza A   • Ambulatory dysfunction   • Tachycardia   • Elevated brain natriuretic peptide (BNP) level       Objective     Vital Signs:     Blood pressure 129/85 Heart Rate: 115 Respiratory Rate 20   Temperature 101 0 Oxygen Saturation 94% Weight 199 5lbs    Physical Exam  Vitals and nursing note reviewed  Constitutional:       General: He is not in acute distress  Appearance: He is not diaphoretic  Comments: Frail looking   HENT:      Head: Normocephalic and atraumatic  Ears:      Comments: [place]     Mouth/Throat:      Mouth: Mucous membranes are dry  Eyes:      General:         Right eye: No discharge  Left eye: No discharge  Pupils: Pupils are equal, round, and reactive to light  Cardiovascular:      Rate and Rhythm: Normal rate and regular rhythm  Heart sounds: Normal heart sounds  No murmur heard  Pulmonary:      Effort: Pulmonary effort is normal  No respiratory distress  Breath sounds: No wheezing  Comments: Mildly decreased at basis  Abdominal:      Palpations: Abdomen is soft  Tenderness: There is no abdominal tenderness  There is no guarding or rebound  Genitourinary:     Comments: Nephrostomy tubes bilateral, right side hematuria  Musculoskeletal:         General: No tenderness  Cervical back: Normal range of motion and neck supple  Right lower leg: Edema (trace) present  Left lower leg: Edema (trace) present  Skin:     General: Skin is warm and dry  Neurological:      Mental Status: He is alert and oriented to person, place, and time  Mental status is at baseline  Psychiatric:         Behavior: Behavior normal          Pertinent Laboratory/Diagnostic Studies:  Laboratory and Imaging studies reviewed  Full report in the paper chart  Current Medications   Medications reviewed and updated in facility chart      Name: Alcides Christina  : 1945  MRN: [id number]        Shilpa Chen MD  Geriatric Medicine  2023 10:32 AM

## 2023-01-06 NOTE — QUICK NOTE
Received consult on patient in [facility] for symptomatic anemia/bladder cancer  Patient with history of metastatic high-grade urothelial/bladder carcinoma for the care of Dr Joshua Crowley  Has been receiving palliative therapy treatment with Padcev last dose 12/2  Hospitalized 12/12 through 12/22 due to hematuria/UTI requiring post hospital short term rehabilitation  Chemotherapy treatment should remain on hold for now untill he is clinically stable/discharged; particularly since he is positive flu  To follow-up with primary oncologist after discharge for reevaluation prior to resume treatment  Suspect that his anemia is likely multifactorial but primarily due to iron deficiency from recent hematuria  Also has a history of gastric bypass surgery iron deficiency from malabsorption is also likely  Was admitted recently to [facility] and received 2 units of packed red blood cells  Hemoglobin is stable this morning 9 4  Spoke with attending via TT (Maryuri Aragon) and recommended discontinuing hematology consultation for now and pursuing iron panel which she states she will have done on Monday  Will reconsult us next week if patient is still in [facility] unit and iron deficiency confirmed  Once confirmed can arrange for IV iron replacement in the infusion center if feasible which will most likely improve his anemia significantly  Is unlikely to respond to oral iron he had bariatric  Please contact us with any further questions in the interim    Nessa Marshall NP  Hem/Onc

## 2023-01-06 NOTE — ASSESSMENT & PLAN NOTE
Hemoglobin improved, today 9 4  Continue to monitor CBC  Transfuse if hemoglobin less than 7 0  Consulted heme-onc

## 2023-01-06 NOTE — ASSESSMENT & PLAN NOTE
Patient reports mild cough, spiked a fever of 101 Fahrenheit this morning ,saturates well in room air    Chest x-ray results pending  Monitor closely  Start Tamiflu 30 mg daily for 5 days  Continue isolation and provide supportive care  Encourage out of bed as tolerated and continue physical therapy  Encourage p o  intake

## 2023-01-06 NOTE — PROGRESS NOTES
Port accessed per order, flushes freely with brisk blood return, port clamped and capped with chg dressing applied, primary RN notified of above and dressing change in 7 days or prn

## 2023-01-07 ENCOUNTER — APPOINTMENT (EMERGENCY)
Dept: CT IMAGING | Facility: HOSPITAL | Age: 78
End: 2023-01-07

## 2023-01-07 ENCOUNTER — HOSPITAL ENCOUNTER (EMERGENCY)
Facility: HOSPITAL | Age: 78
End: 2023-01-07
Attending: EMERGENCY MEDICINE

## 2023-01-07 ENCOUNTER — HOSPITAL ENCOUNTER (INPATIENT)
Facility: HOSPITAL | Age: 78
LOS: 17 days | End: 2023-01-24
Attending: INTERNAL MEDICINE | Admitting: INTERNAL MEDICINE

## 2023-01-07 VITALS
OXYGEN SATURATION: 98 % | TEMPERATURE: 97.6 F | RESPIRATION RATE: 18 BRPM | DIASTOLIC BLOOD PRESSURE: 67 MMHG | SYSTOLIC BLOOD PRESSURE: 128 MMHG | HEART RATE: 78 BPM

## 2023-01-07 DIAGNOSIS — B96.89 BACTEREMIA DUE TO ENTEROBACTER SPECIES: ICD-10-CM

## 2023-01-07 DIAGNOSIS — Z95.828 PORT-A-CATH IN PLACE: ICD-10-CM

## 2023-01-07 DIAGNOSIS — C67.3 MALIGNANT NEOPLASM OF ANTERIOR WALL OF URINARY BLADDER (HCC): ICD-10-CM

## 2023-01-07 DIAGNOSIS — B95.2 UTI (URINARY TRACT INFECTION) DUE TO ENTEROCOCCUS: Primary | ICD-10-CM

## 2023-01-07 DIAGNOSIS — Z93.6 NEPHROSTOMY STATUS (HCC): Primary | ICD-10-CM

## 2023-01-07 DIAGNOSIS — R78.81 BACTEREMIA DUE TO ENTEROBACTER SPECIES: ICD-10-CM

## 2023-01-07 DIAGNOSIS — R31.0 GROSS HEMATURIA: ICD-10-CM

## 2023-01-07 DIAGNOSIS — N39.0 UTI (URINARY TRACT INFECTION) DUE TO ENTEROCOCCUS: Primary | ICD-10-CM

## 2023-01-07 PROBLEM — D62 ACUTE ON CHRONIC BLOOD LOSS ANEMIA: Status: ACTIVE | Noted: 2023-01-07

## 2023-01-07 PROBLEM — I95.9 SYMPTOMATIC HYPOTENSION: Status: ACTIVE | Noted: 2022-12-27

## 2023-01-07 LAB
ABO GROUP BLD: NORMAL
ALBUMIN SERPL BCP-MCNC: 3.4 G/DL (ref 3.5–5)
ALP SERPL-CCNC: 96 U/L (ref 43–122)
ALT SERPL W P-5'-P-CCNC: 24 U/L
ANION GAP SERPL CALCULATED.3IONS-SCNC: 9 MMOL/L (ref 5–14)
APTT PPP: 36 SECONDS (ref 23–37)
AST SERPL W P-5'-P-CCNC: 34 U/L (ref 17–59)
ATRIAL RATE: 78 BPM
BACTERIA UR QL AUTO: ABNORMAL /HPF
BASOPHILS # BLD AUTO: 0.02 THOUSANDS/ÂΜL (ref 0–0.1)
BASOPHILS # BLD AUTO: 0.02 THOUSANDS/ÂΜL (ref 0–0.1)
BASOPHILS NFR BLD AUTO: 0 % (ref 0–1)
BASOPHILS NFR BLD AUTO: 0 % (ref 0–1)
BILIRUB DIRECT SERPL-MCNC: 0.55 MG/DL (ref 0–0.2)
BILIRUB SERPL-MCNC: 0.63 MG/DL (ref 0.2–1)
BILIRUB UR QL STRIP: NEGATIVE
BLD GP AB SCN SERPL QL: NEGATIVE
BUN SERPL-MCNC: 57 MG/DL (ref 5–25)
CALCIUM SERPL-MCNC: 8.9 MG/DL (ref 8.4–10.2)
CARDIAC TROPONIN I PNL SERPL HS: 10 NG/L (ref 8–18)
CHLORIDE SERPL-SCNC: 101 MMOL/L (ref 96–108)
CLARITY UR: ABNORMAL
CO2 SERPL-SCNC: 25 MMOL/L (ref 21–32)
COLOR UR: ABNORMAL
CREAT SERPL-MCNC: 3.21 MG/DL (ref 0.7–1.5)
EOSINOPHIL # BLD AUTO: 0.61 THOUSAND/ÂΜL (ref 0–0.61)
EOSINOPHIL # BLD AUTO: 0.67 THOUSAND/ÂΜL (ref 0–0.61)
EOSINOPHIL NFR BLD AUTO: 7 % (ref 0–6)
EOSINOPHIL NFR BLD AUTO: 7 % (ref 0–6)
ERYTHROCYTE [DISTWIDTH] IN BLOOD BY AUTOMATED COUNT: 15.7 % (ref 11.6–15.1)
ERYTHROCYTE [DISTWIDTH] IN BLOOD BY AUTOMATED COUNT: 15.9 % (ref 11.6–15.1)
GFR SERPL CREATININE-BSD FRML MDRD: 17 ML/MIN/1.73SQ M
GLUCOSE SERPL-MCNC: 174 MG/DL (ref 65–140)
GLUCOSE SERPL-MCNC: 207 MG/DL (ref 70–99)
GLUCOSE UR STRIP-MCNC: NEGATIVE MG/DL
HCT VFR BLD AUTO: 22.3 % (ref 36.5–49.3)
HCT VFR BLD AUTO: 26 % (ref 36.5–49.3)
HGB BLD-MCNC: 7.1 G/DL (ref 12–17)
HGB BLD-MCNC: 8.3 G/DL (ref 12–17)
HGB UR QL STRIP.AUTO: 250
IMM GRANULOCYTES # BLD AUTO: 0.05 THOUSAND/UL (ref 0–0.2)
IMM GRANULOCYTES # BLD AUTO: 0.05 THOUSAND/UL (ref 0–0.2)
IMM GRANULOCYTES NFR BLD AUTO: 1 % (ref 0–2)
IMM GRANULOCYTES NFR BLD AUTO: 1 % (ref 0–2)
INR PPP: 0.9 (ref 0.84–1.19)
KETONES UR STRIP-MCNC: ABNORMAL MG/DL
LACTATE SERPL-SCNC: 1.4 MMOL/L (ref 0.5–2)
LEUKOCYTE ESTERASE UR QL STRIP: 500
LYMPHOCYTES # BLD AUTO: 2.37 THOUSANDS/ÂΜL (ref 0.6–4.47)
LYMPHOCYTES # BLD AUTO: 2.62 THOUSANDS/ÂΜL (ref 0.6–4.47)
LYMPHOCYTES NFR BLD AUTO: 26 % (ref 14–44)
LYMPHOCYTES NFR BLD AUTO: 27 % (ref 14–44)
MCH RBC QN AUTO: 29.7 PG (ref 26.8–34.3)
MCH RBC QN AUTO: 29.9 PG (ref 26.8–34.3)
MCHC RBC AUTO-ENTMCNC: 31.8 G/DL (ref 31.4–37.4)
MCHC RBC AUTO-ENTMCNC: 31.9 G/DL (ref 31.4–37.4)
MCV RBC AUTO: 93 FL (ref 82–98)
MCV RBC AUTO: 94 FL (ref 82–98)
MONOCYTES # BLD AUTO: 0.55 THOUSAND/ÂΜL (ref 0.17–1.22)
MONOCYTES # BLD AUTO: 0.63 THOUSAND/ÂΜL (ref 0.17–1.22)
MONOCYTES NFR BLD AUTO: 6 % (ref 4–12)
MONOCYTES NFR BLD AUTO: 6 % (ref 4–12)
NEUTROPHILS # BLD AUTO: 5.57 THOUSANDS/ÂΜL (ref 1.85–7.62)
NEUTROPHILS # BLD AUTO: 5.79 THOUSANDS/ÂΜL (ref 1.85–7.62)
NEUTS SEG NFR BLD AUTO: 59 % (ref 43–75)
NEUTS SEG NFR BLD AUTO: 60 % (ref 43–75)
NITRITE UR QL STRIP: NEGATIVE
NON-SQ EPI CELLS URNS QL MICRO: ABNORMAL /HPF
NRBC BLD AUTO-RTO: 0 /100 WBCS
NRBC BLD AUTO-RTO: 0 /100 WBCS
NT-PROBNP SERPL-MCNC: 2590 PG/ML (ref 0–299)
P AXIS: 21 DEGREES
PH UR STRIP.AUTO: 6.5 [PH]
PLATELET # BLD AUTO: 168 THOUSANDS/UL (ref 149–390)
PLATELET # BLD AUTO: 196 THOUSANDS/UL (ref 149–390)
PMV BLD AUTO: 10 FL (ref 8.9–12.7)
PMV BLD AUTO: 10.5 FL (ref 8.9–12.7)
POTASSIUM SERPL-SCNC: 5.5 MMOL/L (ref 3.5–5.3)
PR INTERVAL: 184 MS
PROT SERPL-MCNC: 7.6 G/DL (ref 6.4–8.4)
PROT UR STRIP-MCNC: >=500 MG/DL
PROTHROMBIN TIME: 12.5 SECONDS (ref 11.6–14.5)
QRS AXIS: -13 DEGREES
QRSD INTERVAL: 100 MS
QT INTERVAL: 388 MS
QTC INTERVAL: 442 MS
RBC # BLD AUTO: 2.39 MILLION/UL (ref 3.88–5.62)
RBC # BLD AUTO: 2.78 MILLION/UL (ref 3.88–5.62)
RBC #/AREA URNS AUTO: ABNORMAL /HPF
RH BLD: POSITIVE
SODIUM SERPL-SCNC: 135 MMOL/L (ref 135–147)
SP GR UR STRIP.AUTO: 1.01 (ref 1–1.04)
SPECIMEN EXPIRATION DATE: NORMAL
T WAVE AXIS: 80 DEGREES
UROBILINOGEN UA: NEGATIVE MG/DL
VENTRICULAR RATE: 78 BPM
WBC # BLD AUTO: 9.17 THOUSAND/UL (ref 4.31–10.16)
WBC # BLD AUTO: 9.78 THOUSAND/UL (ref 4.31–10.16)
WBC #/AREA URNS AUTO: ABNORMAL /HPF

## 2023-01-07 RX ORDER — POLYETHYLENE GLYCOL 3350 17 G/17G
17 POWDER, FOR SOLUTION ORAL DAILY PRN
Status: DISCONTINUED | OUTPATIENT
Start: 2023-01-07 | End: 2023-01-24 | Stop reason: HOSPADM

## 2023-01-07 RX ORDER — PANTOPRAZOLE SODIUM 20 MG/1
20 TABLET, DELAYED RELEASE ORAL
Status: DISCONTINUED | OUTPATIENT
Start: 2023-01-08 | End: 2023-01-24 | Stop reason: HOSPADM

## 2023-01-07 RX ORDER — HYDROMORPHONE HCL/PF 1 MG/ML
0.5 SYRINGE (ML) INJECTION ONCE
Status: COMPLETED | OUTPATIENT
Start: 2023-01-07 | End: 2023-01-07

## 2023-01-07 RX ORDER — VANCOMYCIN HYDROCHLORIDE 500 MG/100ML
500 INJECTION, SOLUTION INTRAVENOUS ONCE
Status: DISCONTINUED | OUTPATIENT
Start: 2023-01-07 | End: 2023-01-07 | Stop reason: DRUGHIGH

## 2023-01-07 RX ORDER — INSULIN LISPRO 100 [IU]/ML
1-5 INJECTION, SOLUTION INTRAVENOUS; SUBCUTANEOUS
Status: DISCONTINUED | OUTPATIENT
Start: 2023-01-07 | End: 2023-01-10

## 2023-01-07 RX ORDER — ACETAMINOPHEN 325 MG/1
975 TABLET ORAL ONCE
Status: DISCONTINUED | OUTPATIENT
Start: 2023-01-07 | End: 2023-01-07 | Stop reason: HOSPADM

## 2023-01-07 RX ORDER — ALPRAZOLAM 0.25 MG/1
0.25 TABLET ORAL 2 TIMES DAILY PRN
Status: DISCONTINUED | OUTPATIENT
Start: 2023-01-07 | End: 2023-01-24 | Stop reason: HOSPADM

## 2023-01-07 RX ORDER — POLYETHYLENE GLYCOL 3350 17 G/17G
17 POWDER, FOR SOLUTION ORAL DAILY
Status: DISCONTINUED | OUTPATIENT
Start: 2023-01-08 | End: 2023-01-07

## 2023-01-07 RX ORDER — FUROSEMIDE 20 MG/1
20 TABLET ORAL DAILY
Status: DISCONTINUED | OUTPATIENT
Start: 2023-01-08 | End: 2023-01-24 | Stop reason: HOSPADM

## 2023-01-07 RX ORDER — INSULIN GLARGINE 100 [IU]/ML
18 INJECTION, SOLUTION SUBCUTANEOUS
Status: DISCONTINUED | OUTPATIENT
Start: 2023-01-07 | End: 2023-01-09

## 2023-01-07 RX ORDER — SIMETHICONE 80 MG
80 TABLET,CHEWABLE ORAL 4 TIMES DAILY PRN
Status: DISCONTINUED | OUTPATIENT
Start: 2023-01-07 | End: 2023-01-24 | Stop reason: HOSPADM

## 2023-01-07 RX ORDER — ONDANSETRON 2 MG/ML
4 INJECTION INTRAMUSCULAR; INTRAVENOUS EVERY 6 HOURS PRN
Status: DISCONTINUED | OUTPATIENT
Start: 2023-01-07 | End: 2023-01-24 | Stop reason: HOSPADM

## 2023-01-07 RX ORDER — INSULIN LISPRO 100 [IU]/ML
1-6 INJECTION, SOLUTION INTRAVENOUS; SUBCUTANEOUS
Status: DISCONTINUED | OUTPATIENT
Start: 2023-01-08 | End: 2023-01-10

## 2023-01-07 RX ORDER — MAGNESIUM HYDROXIDE/ALUMINUM HYDROXICE/SIMETHICONE 120; 1200; 1200 MG/30ML; MG/30ML; MG/30ML
30 SUSPENSION ORAL EVERY 6 HOURS PRN
Status: DISCONTINUED | OUTPATIENT
Start: 2023-01-07 | End: 2023-01-12

## 2023-01-07 RX ORDER — SENNOSIDES 8.6 MG
2 TABLET ORAL 2 TIMES DAILY PRN
Status: DISCONTINUED | OUTPATIENT
Start: 2023-01-07 | End: 2023-01-12

## 2023-01-07 RX ORDER — HYDROMORPHONE HCL IN WATER/PF 6 MG/30 ML
0.2 PATIENT CONTROLLED ANALGESIA SYRINGE INTRAVENOUS 4 TIMES DAILY PRN
Status: DISCONTINUED | OUTPATIENT
Start: 2023-01-07 | End: 2023-01-24 | Stop reason: HOSPADM

## 2023-01-07 RX ORDER — SODIUM CHLORIDE 9 MG/ML
75 INJECTION, SOLUTION INTRAVENOUS CONTINUOUS
Status: DISPENSED | OUTPATIENT
Start: 2023-01-08 | End: 2023-01-09

## 2023-01-07 RX ORDER — ACETAMINOPHEN 325 MG/1
650 TABLET ORAL EVERY 6 HOURS PRN
Status: DISCONTINUED | OUTPATIENT
Start: 2023-01-07 | End: 2023-01-08

## 2023-01-07 RX ORDER — EZETIMIBE 10 MG/1
10 TABLET ORAL DAILY
Status: DISCONTINUED | OUTPATIENT
Start: 2023-01-08 | End: 2023-01-24 | Stop reason: HOSPADM

## 2023-01-07 RX ORDER — CALCITRIOL 0.25 UG/1
0.25 CAPSULE, LIQUID FILLED ORAL DAILY
Status: DISCONTINUED | OUTPATIENT
Start: 2023-01-08 | End: 2023-01-24 | Stop reason: HOSPADM

## 2023-01-07 RX ORDER — ARIPIPRAZOLE 2 MG/1
2 TABLET ORAL DAILY
Status: DISCONTINUED | OUTPATIENT
Start: 2023-01-08 | End: 2023-01-24 | Stop reason: HOSPADM

## 2023-01-07 RX ORDER — VANCOMYCIN HYDROCHLORIDE 1 G/200ML
1000 INJECTION, SOLUTION INTRAVENOUS ONCE
Status: COMPLETED | OUTPATIENT
Start: 2023-01-07 | End: 2023-01-07

## 2023-01-07 RX ORDER — AZELASTINE 1 MG/ML
1 SPRAY, METERED NASAL 2 TIMES DAILY PRN
Status: DISCONTINUED | OUTPATIENT
Start: 2023-01-07 | End: 2023-01-24 | Stop reason: HOSPADM

## 2023-01-07 RX ORDER — SENNOSIDES 8.6 MG
2 TABLET ORAL 2 TIMES DAILY
Status: DISCONTINUED | OUTPATIENT
Start: 2023-01-08 | End: 2023-01-07

## 2023-01-07 RX ORDER — OXYCODONE HYDROCHLORIDE 5 MG/1
5 TABLET ORAL 4 TIMES DAILY PRN
Status: DISCONTINUED | OUTPATIENT
Start: 2023-01-07 | End: 2023-01-08

## 2023-01-07 RX ORDER — OXYCODONE HYDROCHLORIDE 10 MG/1
10 TABLET ORAL 3 TIMES DAILY PRN
Status: DISCONTINUED | OUTPATIENT
Start: 2023-01-07 | End: 2023-01-21

## 2023-01-07 RX ORDER — GABAPENTIN 300 MG/1
300 CAPSULE ORAL
Status: DISCONTINUED | OUTPATIENT
Start: 2023-01-07 | End: 2023-01-24 | Stop reason: HOSPADM

## 2023-01-07 RX ADMIN — PIPERACILLIN SODIUM AND TAZOBACTAM SODIUM 2.25 G: 2; .25 INJECTION, POWDER, LYOPHILIZED, FOR SOLUTION INTRAVENOUS at 18:58

## 2023-01-07 RX ADMIN — HYDROMORPHONE HYDROCHLORIDE 0.5 MG: 1 INJECTION, SOLUTION INTRAMUSCULAR; INTRAVENOUS; SUBCUTANEOUS at 20:07

## 2023-01-07 RX ADMIN — VANCOMYCIN HYDROCHLORIDE 1000 MG: 1 INJECTION, SOLUTION INTRAVENOUS at 20:50

## 2023-01-07 NOTE — QUICK NOTE
Covering On Call for [id number] [employer]    Received tiger text from nursing to review labs  Urinalysis with +nitrites, innum WBC, 2+ protein, +blood  Blood cultures obtained 1/6/23 positive for gram negative rods x1 thus far  Urine culture from 12/12/22 growing >100,000 Enterobacter cloacae and >100,000 Enterococcus faecalis, both with antibiotic resistance including tetracycline, levofloxacin, cefazolin, cefuroxime, ampicillin, augmentin; intermediate resistance to nitrofurantoin but patient with CKD4 so this would not be effective; bactrim contraindicated due to borderline GFR<15  Lab Results   Component Value Date     09/03/2015    SODIUM 137 01/06/2023    K 4 5 01/06/2023     01/06/2023    CO2 27 01/06/2023    ANIONGAP 5 09/03/2015    AGAP 7 01/06/2023    BUN 51 (H) 01/06/2023    CREATININE 3 07 (H) 01/06/2023    GLUC 75 01/02/2023    GLUF 80 01/06/2023    CALCIUM 8 6 01/06/2023    AST 42 01/06/2023    ALT 29 01/06/2023    ALKPHOS 106 01/06/2023    PROT 6 4 07/26/2015    TP 7 1 01/06/2023    BILITOT 0 50 07/26/2015    TBILI 0 50 01/06/2023    EGFR 18 01/06/2023     Lab Results   Component Value Date    WBC 8 57 01/06/2023    HGB 9 4 (L) 01/06/2023    HCT 30 1 (L) 01/06/2023    MCV 94 01/06/2023     01/06/2023     Patient afebrile and asymptomatic related to urinary symptoms but will treat given bacteremia  Does have chronic portacath in place that is currently accessed  If repeat cultures are positive, will need inpatient admission for further workup (Echo, possible portacath removal)  New Orders:  Stat labs: procalcitonin, CBC w/diff, CMP  Start ertapenem 500mg IV Q24h x10 days  De-escalate antibiotic therapy as able based on urine and blood culture and sensitivity results   Presumed urinary source given Gram negative rods  Repeat blood cultures 48h after antibiotic initiation  Consider ID consult   Monitor closely; will need inpatient admission if any instability or clinical decline      Addendum 1/7/23 at 1235  Blood culture identification panel growing Enterobacter cloacae, cefepime recommended  Ertapenem has not been obtained from pharmacy due to time sensitivity  D/c ertapenem  Start cefepime 500mg IV Q24h x 10 days  Hgb with 2g drop in past 24h  Baseline Hgb recently 7s-8s; transfuse for Hgb<7  Most recent vitals from nursing 96 5F RR-18 HR-85 BP-101/78 100% RA  Continue to monitor closely with low threshold for transfer with any clinical instability  Repeat CBC, BMP and procalcitonin on 1/8/23  Addendum 1/7/23 at 18  Spoke with granddaughter, Zoya Fragoso, via telephone  Reviewed concerns including ongoing hematuria per UA with drop in Hgb; uptrending BNP and procalcitonin; concern for early sepsis  Reviewed UA and blood culture results; reviewed previous urine culture results and concern for multiple antibiotic resistance and limited oral treatment options given severe kidney disease  Advised workup for bacteremia could include Echo (TTE) and need for more thorough testing with JUAN or assessment/removal of portcath if persistent bacteremia after started on antibiotics  She is requesting urology evaluation for ongoing hematuria with Hgb downtrend and complicated urologic issues at baseline  Recommend ED evaluation and likely transfer to higher level of care at another campus for ID and urology consult +/- cardiology for JUAN if the need should arrise   Case reviewed with Shannan Juarez Hospitalist team

## 2023-01-07 NOTE — ED NOTES
Dr Jeremy Ordonez at bedside  Assessing patient, speaking with family and patient        Sharmila Pena, RN  01/07/23 [id number]

## 2023-01-07 NOTE — ED NOTES
Patient brought down from [facility]  Visitor came to the RN and stated "I know he is going to be transferred to [facility]"  This RN asked her who made those arrangements and advised them of this information  She states patient's wife would have more information  Wife at bedside and has a female on her cellphone on speaker phone  When this RN explaining what process will be completed in the ED, wife rolling her eye, deep breathing and shaking her head  Tells the female on the phone "well stay on stand by"  This RN explaining if patient needs to be admitted medically we will ensure he is admitted medically  The first step is the provider assessing the patient  Expressed understanding        Joyce Mandujano, [place]  01/07/23 [id number]

## 2023-01-07 NOTE — ED NOTES
Patient at [address] [employer]  Patient's wife provided with tess crackers, peanut butter, honey and soda  No further needs at this time       Rhea Bowles RN  01/07/23 1950

## 2023-01-07 NOTE — ED PROVIDER NOTES
History  Chief Complaint   Patient presents with   • Weakness - Generalized     Pt was being cared for on TCF unit  Pt was brought by family members  Pt c/o dizziness, dyspnea, and weakness  HPI  Patient is a 73-year-old male currently residing on the transitional care facility unit with a past medical history of anemia, cardiovascular disease, CKD stage IV, DM, HTN, HLD, bladder cancer metastatic not currently on chemotherapy with a right-sided ureteral stent in place presenting today with multiple complaints including generalized weakness  History is obtained from significant other at bedside as well as granddaughter and patient  Patient reports that he has been having some slight dizziness and weakness over the last several days and feeling febrile  Additional history from transitional care unit notes and laboratory evaluation as well as family reports that he has been dealing with a malignant bladder cancer with ureteral stents and has had chronic/recurrent UTIs and now ongoing hematuria  Reports that his hemoglobin has been dropping including a two-point drop over the last 24 hours likely related to hematuria  He reportedly had low-grade temperatures on the transitional care unit for which blood cultures were drawn from his Port-A-Cath which 1 of 2 returned positive  He also had a UA that returned positive  Patient has not been started on antibiotics yet  He was brought to the emergency department for further evaluation and likely admission to medicine unit  Patient is not currently on blood thinners  Per previous notes hemoglobin goal is greater than 8  He does have a urologist and oncologist however his treatment is at suspension while in the transitional care unit  He has had multiple events during his stay so far including a fall in which he was found to be anemic and hypotensive    He has received multiple units of blood and he received a work-up at that time including CT head which was reassuring  Prior to Admission Medications   Prescriptions Last Dose Informant Patient Reported? Taking?    ARIPiprazole (ABILIFY) 2 mg tablet   Yes No   Sig: Take 2 mg by mouth daily   Accu-Chek Softclix Lancets lancets   No No   Sig: Test blood sugar 4 times daily   Azelastine HCl 0 15 % SOLN   No No   Sig: Inhale 1 spray 2 (two) times a day   Bimatoprost (LUMIGAN OP)   Yes No   Sig: Apply 1 drop to eye daily at bedtime    DULoxetine (CYMBALTA) 30 mg delayed release capsule   No No   Sig: TAKE ONE CAPSULE BY MOUTH EVERY DAY   Ferrous Sulfate Dried (Feosol) 200 (65 Fe) MG TABS   No No   Sig: Take 65 mg by mouth daily   Insulin Pen Needle 31G X 8 MM MISC   No No   Sig: Inject 3 times a day   Lantus SoloStar 100 units/mL injection pen   No No   Si units qhs   Patient taking differently: Inject 18 Units under the skin daily at bedtime   acetaminophen (TYLENOL) 325 mg tablet   Yes No   Sig: Take 650 mg by mouth every 6 (six) hours as needed for mild pain     aspirin (ECOTRIN LOW STRENGTH) 81 mg EC tablet   No No   Sig: Take 1 tablet (81 mg total) by mouth daily   calcitriol (ROCALTROL) 0 25 mcg capsule   No No   Sig: Take 1 capsule (0 25 mcg total) by mouth daily   cyanocobalamin (VITAMIN B-12) 1000 MCG tablet   No No   Sig: Take 1 tablet (1,000 mcg total) by mouth daily   ezetimibe (ZETIA) 10 mg tablet   No No   Sig: Take 1 tablet (10 mg total) by mouth daily   fluticasone (FLONASE) 50 mcg/act nasal spray   No No   Si spray into each nostril as needed for allergies   furosemide (LASIX) 20 mg tablet   No No   Sig: Take 1 tablet (20 mg total) by mouth daily   gabapentin (NEURONTIN) 300 mg capsule   No No   Sig: TAKE ONE CAPSULE BY MOUTH EVERY DAY AT BEDTIME   insulin lispro (HumaLOG) 100 units/mL injection   No No   Sig: Inject 5 Units under the skin 3 (three) times a day with meals   loperamide (IMODIUM) 2 mg capsule   Yes No   Sig: Take 2 mg by mouth 4 (four) times a day as needed for diarrhea   multivitamin [facility]) TABS   Yes No   Sig: Take 1 tablet by mouth daily  omeprazole (PriLOSEC) 20 mg delayed release capsule   No No   Sig: Take 1 capsule (20 mg total) by mouth daily in the early morning PRN   polyethylene glycol (MIRALAX) 17 g packet   No No   Sig: Take 17 g by mouth daily   senna (SENOKOT) 8 6 MG tablet   No No   Sig: Take 2 tablets (17 2 mg total) by mouth 2 (two) times a day      Facility-Administered Medications: None       Past Medical History:   Diagnosis Date   • Anemia     Last assessed: 9/28/17   • Anxiety    • Arteriosclerotic cardiovascular disease     Last assessed: 9/28/17   • Arthritis    • Bladder cancer ([facility] )     bladder- had BCG treatments   • Chronic kidney disease     Stage IV   • CKD (chronic kidney disease) stage 4, GFR 15-29 ml/min ([facility])    • Colon polyp    • Coronary artery disease     7 stents   • Depression    • Diabetes mellitus ([facility])     IDDM   • GERD (gastroesophageal reflux disease)    • Glaucoma    • Hematuria    • History of fusion of cervical spine    • Hyperlipidemia    • Hypertension    • Insomnia     Last assessed: 11/14/12   • Loss of hearing     has hearing aids but usually does not wear them   • Lung cancer ([facility] )    • Metastatic cancer ([facility] )    • Other seasonal allergic rhinitis     Last assessed: 2/10/16   • PAD (peripheral artery disease) ([facility])    • Shortness of breath     on exertion   • Spinal stenosis of lumbar region    • Transient cerebral ischemia     No Residual   • Uses walker     w/c for longer distances       Past Surgical History:   Procedure Laterality Date   • CARDIAC SURGERY      Cath stent placement  Last assessed: 3/9/17  Interventional Catheterization   • CHOLECYSTECTOMY     • COLONOSCOPY     • CYSTOSCOPY      Diagnostic w/biopsy  Jazmin Taras  Last assessed: 12/1/14   • CYSTOSCOPY N/A 04/12/2022    Procedure: CYSTOSCOPY  Bladder biopsies  ;  Surgeon: India Tee MD;  [facility];  Service: Urology   • CYSTOSCOPY W/ RETROGRADES Right 03/01/2022    Procedure: CYSTO; stent removal retrograde;  Surgeon: Dixie Hernandez MD;  [facility];  Service: Urology   • CYSTOSCOPY W/ URETERAL STENT PLACEMENT Bilateral 10/18/2021    Procedure: bilateral retrogrades, cytology collection;  Surgeon: Dixie Hernandez MD;  [facility];  Service: Urology   • CYSTOURETHROSCOPY      w/cautery  Jane Fine   • FL RETROGRADE PYELOGRAM  10/18/2021   • FL RETROGRADE PYELOGRAM  10/24/2021   • FL RETROGRADE PYELOGRAM  12/14/2022   • GASTRIC BYPASS      For morbid obesity w/Shaji-en-Y   Resolved: 11/17/09   • INCISION AND DRAINAGE OF WOUND Right 02/26/2017    Procedure: INCISION AND DRAINAGE (I&D) EXTREMITY WITH APPLICATION OF GRAFT JACKET;  Surgeon: Barb Nieves DPM;  [facility];  Service:    • INCISION AND DRAINAGE OF WOUND Right 04/25/2017    Procedure: INCISION AND DRAINAGE (I&D) EXTREMITY, APPLICATION OF GRAFT;  Surgeon: Barb Nieves DPM;  [facility];  Service:    • IR BIOPSY OTHER  07/02/2020   • IR LOWER EXTREMITY ANGIOGRAM  02/08/2021   • IR LOWER EXTREMITY ANGIOGRAM  02/11/2021   • IR NEPHROSTOMY TUBE CHECK/CHANGE/REPOSITION/REINSERTION/UPSIZE  04/28/2022   • IR NEPHROSTOMY TUBE CHECK/CHANGE/REPOSITION/REINSERTION/UPSIZE  05/24/2022   • IR NEPHROSTOMY TUBE CHECK/CHANGE/REPOSITION/REINSERTION/UPSIZE  06/07/2022   • IR NEPHROSTOMY TUBE CHECK/CHANGE/REPOSITION/REINSERTION/UPSIZE  07/28/2022   • IR NEPHROSTOMY TUBE CHECK/CHANGE/REPOSITION/REINSERTION/UPSIZE  11/15/2022   • IR NEPHROSTOMY TUBE PLACEMENT  02/25/2022   • IR PORT PLACEMENT  01/17/2022   • IR THORACENTESIS  09/02/2022   • IR THORACENTESIS  09/14/2022   • IR THORACENTESIS WITH TUBE PLACEMENT Left     october   • IR TUNNELED CENTRAL LINE PLACEMENT  12/24/2020   • JOINT REPLACEMENT      christofer knees replaced   • [place] AMPUTATION METATARSAL W/TOE SINGLE Left 12/21/2020    Procedure: RAY RESECTION FOOT;  Surgeon: Brooks Underwood DPM;  [facility];  Service: Podiatry   • [place] AMPUTATION METATARSAL W/TOE SINGLE Left 12/31/2020    Procedure: 5TH MET RESECTION;  Surgeon: Jaquelin Chavez DPM;  [facility];  Service: Podiatry   • NJ CYSTO BLADDER W/URETERAL CATHETERIZATION Right 12/14/2022    Procedure: CYSTOSCOPY WITH RETROGRADE PYELOGRAM and CLOT EVACUATION, RIGHT STENT INSERTION, FULGRATION OF BLEEDING POINTS;  Surgeon: Deepak Bhardwaj MD;  [facility];  Service: Urology   • NJ CYSTO W/IRRIG & EVAC MULTPLE OBSTRUCTING CLOTS N/A 02/10/2021    Procedure: CYSTOSCOPY EVACUATION OF CLOTS, fulguration;  Surgeon: Pam Cardenas MD;  [facility];  Service: Urology   • NJ CYSTO W/IRRIG & EVAC MULTPLE OBSTRUCTING CLOTS N/A 10/24/2021    Procedure: CYSTOSCOPY EVACUATION OF CLOT, fulguration of bleeding vessels, right ureter stent placement, retrograde pyelogram;  Surgeon: Dennis Landaverde MD;  [facility];  Service: Urology   • NJ CYSTO W/REMOVAL OF LESIONS SMALL N/A 11/19/2020    Procedure: CYSTO W/BIOPSIES, transurethral prostate bx;  Surgeon: Darryl Maynard MD;  [facility];  Service: Urology   • NJ CYSTOURETHROSCOPY W/DEST &/RMVL TUMOR LARGE Bilateral 10/18/2021    Procedure: TRANSURETHRAL RESECTION OF BLADDER TUMOR (TURBT);   Surgeon: Vic Prakash MD;  [facility];  Service: Urology   • NJ CYSTOURETHROSCOPY WITH BIOPSY N/A 08/16/2016    Procedure: CYSTOSCOPY WITH BIOPSIES;  Surgeon: Macey Asif MD;  [facility];  Service: Urology   • NJ [place] 3RD+ ORD [address] PEL/LXTR [facility] Left 02/08/2021    Procedure: LEG angiogram, CO2 w/limited contrast with balloon angioplasty postertior tibial artery;  Surgeon: Shelly Goddard MD;  [facility];  Service: Vascular   • ROTATOR CUFF REPAIR     • SMALL INTESTINE SURGERY      Surgery Shaji-en-Y   • SPINAL FUSION      lumbar and cervical fusions   • VAC DRESSING APPLICATION Right 77/69/9708    Procedure: APPLICATION VAC DRESSING;  Surgeon: Dayanna Forrest DEJUAN;  [facility];  Service:    • WOUND DEBRIDEMENT Left 2021    Procedure: FOOT DEBRIDE, [address] OUT w/graft application;  Surgeon: Cj Alejandre DPM;  [facility];  Service: Podiatry       Family History   Problem Relation Age of Onset   • Diabetes Mother    • Heart disease Mother    • Other Mother         High blood pressure   • Heart disease Father    • Diabetes Sister    • Other Sister         High blood pressure   • Kidney disease Sister    • Heart disease Brother    • Other Brother         High blood pressure     I have reviewed and agree with the history as documented  E-Cigarette/Vaping   • E-Cigarette Use Never User      E-Cigarette/Vaping Substances   • Nicotine No    • THC No    • CBD No    • Flavoring No    • Other No    • Unknown No      Social History     Tobacco Use   • Smoking status: Former     Packs/day: 3 00     Years: 27 00     Pack years: 81 00     Types: Cigarettes     Quit date: 1970     Years since quittin 0   • Smokeless tobacco: Never   Vaping Use   • Vaping Use: Never used   Substance Use Topics   • Alcohol use: Never     Comment: beer / liquor   • Drug use: Not Currently     Types: Marijuana     Comment: quit 2019 had medical marijuana       Review of Systems   Constitutional: Positive for fatigue and fever (subjective)  Negative for chills  HENT: Negative for congestion, rhinorrhea and sore throat  Eyes: Negative for redness and visual disturbance  Respiratory: Negative for cough and shortness of breath  Cardiovascular: Negative for chest pain and palpitations  Gastrointestinal: Positive for abdominal pain (chronic @ baseline)  Negative for constipation, diarrhea, nausea and vomiting  Genitourinary: Positive for dysuria and hematuria  Musculoskeletal: Negative for myalgias and neck pain  Skin: Negative for rash and wound  Allergic/Immunologic: Negative for immunocompromised state  Neurological: Positive for weakness   Negative for seizures and syncope  Psychiatric/Behavioral: Negative for confusion and suicidal ideas  Physical Exam  Physical Exam  Vitals and nursing note reviewed  Constitutional:       General: He is sleeping  He is not in acute distress  Appearance: Normal appearance  He is well-developed  He is ill-appearing  Comments: Fatigued, requires redirection but is AOx3   HENT:      Head: Normocephalic and atraumatic  No raccoon eyes  Right Ear: External ear normal       Left Ear: External ear normal       Nose: Nose normal  No congestion  Mouth/Throat:      Lips: Pink  Mouth: Mucous membranes are moist    Eyes:      General: Lids are normal  No scleral icterus  Extraocular Movements: Extraocular movements intact  Cardiovascular:      Rate and Rhythm: Normal rate and regular rhythm  Heart sounds: No murmur heard  No friction rub  Pulmonary:      Effort: Pulmonary effort is normal  No respiratory distress  Breath sounds: No wheezing or rhonchi  Chest:      Comments: Port a cath in place and accessed  Abdominal:      General: Abdomen is flat  Palpations: Abdomen is soft  Tenderness: There is generalized abdominal tenderness  There is no guarding or rebound  Comments: Grimaces to palpation, no focality     Musculoskeletal:      Cervical back: Normal range of motion  No torticollis  Skin:     General: Skin is warm and dry  Coloration: Skin is not jaundiced  Findings: No rash  Neurological:      Mental Status: He is oriented to person, place, and time  Mental status is at baseline  Psychiatric:         Behavior: Behavior normal  Behavior is cooperative           Vital Signs  ED Triage Vitals [01/07/23 1508]   Temperature Pulse Respirations Blood Pressure SpO2   97 6 °F (36 4 °C) 78 20 158/68 98 %      Temp Source Heart Rate Source Patient Position - Orthostatic VS BP Location FiO2 (%)   Oral Monitor Sitting Left arm --      Pain Score       -- Vitals:    01/07/23 1508   BP: 158/68   Pulse: 78   Patient Position - Orthostatic VS: Sitting         Visual Acuity      ED Medications  Medications - No data to display    Diagnostic Studies  Results Reviewed     Procedure Component Value Units Date/Time    CBC and differential [[id number]]     Lab Status: No result Specimen: Blood     Basic metabolic panel [[id number]]     Lab Status: No result Specimen: Blood     Hepatic function panel [[id number]]     Lab Status: No result Specimen: Blood     UA w Reflex to Microscopic w Reflex to Culture [[id number]]     Lab Status: No result Specimen: Urine     Urine culture [[id number]]     Lab Status: No result Specimen: Urine, Other     Lactic acid, plasma [[id number]]     Lab Status: No result Specimen: Blood     Protime-INR [[id number]]     Lab Status: No result Specimen: Blood     APTT [[id number]]     Lab Status: No result Specimen: Blood     Blood culture #1 [[id number]]     Lab Status: No result Specimen: Blood from Central Venous Line     Blood culture #2 [[id number]]     Lab Status: No result Specimen: Blood from Line, Venous     High Sensitivity Troponin I Random [[id number]]     Lab Status: No result Specimen: Blood     NT-BNP PRO [[id number]]     Lab Status: No result Specimen: Blood     Iron Saturation % [[id number]]     Lab Status: No result Specimen: Blood     Ferritin [[id number]]     Lab Status: No result Specimen: Blood                  CT abdomen pelvis wo contrast    (Results Pending)              Procedures  Procedures         ED Course  ED Course as of 01/07/23 2330   Sat Jan 07, 2023   1706 NT-proBNP(!): 2,590   1706 Trop decreased from priors     1706 BNP stable   1707 WBC: 9 17   1707 Hemoglobin(!): 8 3   1736 Leukocytes, UA(!): 500 0   1736 Nitrite, UA: Negative   1855 Had a long discussion with the patient, significant other and granddaughter via phone about the patient's care so far discussed concerns including the positive blood culture and urine culture and the fevers on the floor  Will place on broad-spectrum antibiotics given the multiple resistance on the previous culture  Family was frustrated that we redrew blood cultures  Advised that the blood cultures were drawn sometime ago and that contamination could be contributing and that there is a need to obtain cultures from the Port-A-Cath as well as from peripheral IV to assess for catheter site infection  They were not happy with this answer  They were also concerned about the potassium being hemolyzed at this time he has no EKG changes and no other changes with only mild hyperkalemia likely related to the hemolysis  This was a difficult patient encounter  The patient's expectations regarding management were not able to be met given the guidelines from the hospital and the patient's personal care guidelines  I discussed at length with the patient my concerns and offered treatment options that were in keeping with our policies and procedures  Unfortunately this did not alleviate the patient's concerns  At all times, myself and the staff were respectful of the patient, attempted to solve their issues within our constraints and treated the patient with all due courtesy  Unfortunately the patient remains disappointed with the care he/she received  63 Family particularly the granddaughter has been verbally aggressive and condescending to staff  Has made multiple allegations that we are intentionally delaying care  Will states that staff members have been condescending, multiple interactions for witnessed in which staff is been kind inpatient and family has been verbally degrading with names called including "idiot" and "dumb"  We have informed her that the process for transport can take time  Informed them that even if there are beds available multiple factors including staffing concerns, outside transports could delay them being transferred to [facility]    Again remained verbally aggressive and degrading and at many times has been obstructing his care  1933 Hemoglobin(!): 7 1   Initial work-up largely reassuring hemoglobin was initially 8 3 back on family's request will redraw  Urine from clean-catch bladder with gross signs of infection with WBCs, RBCs we will send for culture  Prior urine culture grew Enterococcus cloacae and Enterococcus faecalis with multiple resistances  It appears the combination of vancomycin and Zosyn should cover both organisms especially in setting of fevers  Culture on 1/6 also grew gram-negative rods  Renally dosed medications after discussing with pharmacy  BMP with stable creatinine  BNP is elevated however at a from a priors  Troponin obtained which is decreased from priors  Hepatic function reassuring  Lactic acid negative  Patient was discussed with urology who recommended enterobiasis however would likely not need any surgical intervention has appropriate position of nephrostomy tubes  Recommending transfer  Transfer order placed to [facility] for ID consult, urology consult  Repeat CBC did result in a significantly lowered hemoglobin at 7 1 we will order blood as previous notes have suggested keeping hemoglobin above 8 per lab will have to recent type and screen although recently received blood  Consented for blood  Patient will be transferred to [facility] with blood still running  Patient left facility around 10 PM                             SBIRT 22yo+    Flowsheet Row Most Recent Value   SBIRT (25 yo +)    In order to provide better care to our patients, we are screening all of our patients for alcohol and drug use  Would it be okay to ask you these screening questions? No Filed at: 01/07/2023 1511                    [facility]  Patient on arrival is in no acute distress he is sleeping and requires frequent waking but is able to answer questions and is oriented    He is afebrile and O2 appropriate on room air and hemodynamically stable  Lungs clear to auscultation heart without murmur, abdomen grimaces to palpation however no specific focality no guarding or rebound  Differential remains broad  Patient does have potential for UTI contributing to symptoms had a positive UA with blood several days ago  He also is anemic per last blood draw  This could be contributing to his weakness in general   He did have 1-2 blood cultures returned positive  At this time we will obtain a blood culture from the Port-A-Cath as well as from a peripheral IV  We will obtain additional work-up including troponin, BNP  Disposition  Final diagnoses:   None     ED Disposition     None      Follow-up Information    None         Patient's Medications   Discharge Prescriptions    No medications on file       No discharge procedures on file      PDMP Review       Value Time User    PDMP Reviewed  Yes 11/17/2022  6:15 PM Rich Merritt MD          ED Provider  Electronically Signed by           Tanika Martinez MD  01/07/23 1033

## 2023-01-08 LAB
ABO GROUP BLD BPU: NORMAL
ANION GAP SERPL CALCULATED.3IONS-SCNC: 9 MMOL/L (ref 4–13)
BASOPHILS # BLD AUTO: 0.02 THOUSANDS/ÂΜL (ref 0–0.1)
BASOPHILS NFR BLD AUTO: 0 % (ref 0–1)
BPU ID: NORMAL
BUN SERPL-MCNC: 53 MG/DL (ref 5–25)
CALCIUM SERPL-MCNC: 8.4 MG/DL (ref 8.3–10.1)
CHLORIDE SERPL-SCNC: 103 MMOL/L (ref 96–108)
CO2 SERPL-SCNC: 25 MMOL/L (ref 21–32)
CREAT SERPL-MCNC: 3.18 MG/DL (ref 0.6–1.3)
CROSSMATCH: NORMAL
EOSINOPHIL # BLD AUTO: 0.7 THOUSAND/ÂΜL (ref 0–0.61)
EOSINOPHIL NFR BLD AUTO: 10 % (ref 0–6)
ERYTHROCYTE [DISTWIDTH] IN BLOOD BY AUTOMATED COUNT: 15.5 % (ref 11.6–15.1)
FERRITIN SERPL-MCNC: 217 NG/ML (ref 8–388)
GFR SERPL CREATININE-BSD FRML MDRD: 17 ML/MIN/1.73SQ M
GLUCOSE SERPL-MCNC: 106 MG/DL (ref 65–140)
GLUCOSE SERPL-MCNC: 124 MG/DL (ref 65–140)
GLUCOSE SERPL-MCNC: 128 MG/DL (ref 65–140)
GLUCOSE SERPL-MCNC: 238 MG/DL (ref 65–140)
GLUCOSE SERPL-MCNC: 95 MG/DL (ref 65–140)
HCT VFR BLD AUTO: 23.6 % (ref 36.5–49.3)
HGB BLD-MCNC: 7.7 G/DL (ref 12–17)
IMM GRANULOCYTES # BLD AUTO: 0.03 THOUSAND/UL (ref 0–0.2)
IMM GRANULOCYTES NFR BLD AUTO: 0 % (ref 0–2)
IRON SATN MFR SERPL: 12 % (ref 20–50)
IRON SERPL-MCNC: 22 UG/DL (ref 65–175)
LYMPHOCYTES # BLD AUTO: 0.8 THOUSANDS/ÂΜL (ref 0.6–4.47)
LYMPHOCYTES NFR BLD AUTO: 11 % (ref 14–44)
MCH RBC QN AUTO: 30.4 PG (ref 26.8–34.3)
MCHC RBC AUTO-ENTMCNC: 32.6 G/DL (ref 31.4–37.4)
MCV RBC AUTO: 93 FL (ref 82–98)
MONOCYTES # BLD AUTO: 0.36 THOUSAND/ÂΜL (ref 0.17–1.22)
MONOCYTES NFR BLD AUTO: 5 % (ref 4–12)
NEUTROPHILS # BLD AUTO: 5.48 THOUSANDS/ÂΜL (ref 1.85–7.62)
NEUTS SEG NFR BLD AUTO: 74 % (ref 43–75)
NRBC BLD AUTO-RTO: 0 /100 WBCS
PLATELET # BLD AUTO: 157 THOUSANDS/UL (ref 149–390)
PMV BLD AUTO: 10.2 FL (ref 8.9–12.7)
POTASSIUM SERPL-SCNC: 4.8 MMOL/L (ref 3.5–5.3)
RBC # BLD AUTO: 2.53 MILLION/UL (ref 3.88–5.62)
SODIUM SERPL-SCNC: 137 MMOL/L (ref 135–147)
TIBC SERPL-MCNC: 180 UG/DL (ref 250–450)
UNIT DISPENSE STATUS: NORMAL
UNIT PRODUCT CODE: NORMAL
UNIT PRODUCT VOLUME: 300 ML
UNIT RH: NORMAL
WBC # BLD AUTO: 7.39 THOUSAND/UL (ref 4.31–10.16)

## 2023-01-08 PROCEDURE — 0T9B70Z DRAINAGE OF BLADDER WITH DRAINAGE DEVICE, VIA NATURAL OR ARTIFICIAL OPENING: ICD-10-PCS | Performed by: UROLOGY

## 2023-01-08 RX ORDER — ACETAMINOPHEN 325 MG/1
975 TABLET ORAL EVERY 8 HOURS SCHEDULED
Status: DISCONTINUED | OUTPATIENT
Start: 2023-01-08 | End: 2023-01-18

## 2023-01-08 RX ORDER — OXYBUTYNIN CHLORIDE 5 MG/1
5 TABLET ORAL 3 TIMES DAILY
Status: DISCONTINUED | OUTPATIENT
Start: 2023-01-08 | End: 2023-01-24 | Stop reason: HOSPADM

## 2023-01-08 RX ADMIN — GABAPENTIN 300 MG: 300 CAPSULE ORAL at 21:39

## 2023-01-08 RX ADMIN — BIMATOPROST 1 DROP: 0.1 SOLUTION/ DROPS OPHTHALMIC at 00:06

## 2023-01-08 RX ADMIN — OXYBUTYNIN CHLORIDE 5 MG: 5 TABLET ORAL at 21:39

## 2023-01-08 RX ADMIN — SODIUM CHLORIDE 75 ML/HR: 0.9 INJECTION, SOLUTION INTRAVENOUS at 00:05

## 2023-01-08 RX ADMIN — INSULIN LISPRO 1 UNITS: 100 INJECTION, SOLUTION INTRAVENOUS; SUBCUTANEOUS at 00:05

## 2023-01-08 RX ADMIN — OXYCODONE HYDROCHLORIDE 10 MG: 10 TABLET ORAL at 08:56

## 2023-01-08 RX ADMIN — CEFEPIME HYDROCHLORIDE 1000 MG: 1 INJECTION, POWDER, FOR SOLUTION INTRAMUSCULAR; INTRAVENOUS at 00:32

## 2023-01-08 RX ADMIN — GABAPENTIN 300 MG: 300 CAPSULE ORAL at 00:05

## 2023-01-08 RX ADMIN — EZETIMIBE 10 MG: 10 TABLET ORAL at 08:52

## 2023-01-08 RX ADMIN — CALCITRIOL 0.25 MCG: 0.25 CAPSULE, LIQUID FILLED ORAL at 08:52

## 2023-01-08 RX ADMIN — HYDROMORPHONE HYDROCHLORIDE 0.2 MG: 0.2 INJECTION, SOLUTION INTRAMUSCULAR; INTRAVENOUS; SUBCUTANEOUS at 21:42

## 2023-01-08 RX ADMIN — METOPROLOL TARTRATE 25 MG: 25 TABLET, FILM COATED ORAL at 08:52

## 2023-01-08 RX ADMIN — ACETAMINOPHEN 975 MG: 325 TABLET, FILM COATED ORAL at 21:39

## 2023-01-08 RX ADMIN — METOPROLOL TARTRATE 25 MG: 25 TABLET, FILM COATED ORAL at 21:39

## 2023-01-08 RX ADMIN — BIMATOPROST 1 DROP: 0.1 SOLUTION/ DROPS OPHTHALMIC at 21:40

## 2023-01-08 RX ADMIN — CYANOCOBALAMIN TAB 500 MCG 1000 MCG: 500 TAB at 08:52

## 2023-01-08 RX ADMIN — FUROSEMIDE 20 MG: 20 TABLET ORAL at 08:52

## 2023-01-08 RX ADMIN — INSULIN GLARGINE 18 UNITS: 100 INJECTION, SOLUTION SUBCUTANEOUS at 21:39

## 2023-01-08 RX ADMIN — ARIPIPRAZOLE 2 MG: 2 TABLET ORAL at 08:52

## 2023-01-08 RX ADMIN — OXYBUTYNIN CHLORIDE 5 MG: 5 TABLET ORAL at 15:40

## 2023-01-08 RX ADMIN — SODIUM CHLORIDE 75 ML/HR: 0.9 INJECTION, SOLUTION INTRAVENOUS at 15:38

## 2023-01-08 RX ADMIN — OXYCODONE HYDROCHLORIDE 10 MG: 10 TABLET ORAL at 00:12

## 2023-01-08 RX ADMIN — CEFEPIME HYDROCHLORIDE 1000 MG: 1 INJECTION, POWDER, FOR SOLUTION INTRAMUSCULAR; INTRAVENOUS at 12:41

## 2023-01-08 RX ADMIN — CEFEPIME HYDROCHLORIDE 1000 MG: 1 INJECTION, POWDER, FOR SOLUTION INTRAMUSCULAR; INTRAVENOUS at 23:50

## 2023-01-08 RX ADMIN — INSULIN GLARGINE 18 UNITS: 100 INJECTION, SOLUTION SUBCUTANEOUS at 00:05

## 2023-01-08 RX ADMIN — OXYCODONE HYDROCHLORIDE 10 MG: 10 TABLET ORAL at 13:29

## 2023-01-08 RX ADMIN — HYDROMORPHONE HYDROCHLORIDE 0.2 MG: 0.2 INJECTION, SOLUTION INTRAMUSCULAR; INTRAVENOUS; SUBCUTANEOUS at 11:52

## 2023-01-08 RX ADMIN — METOPROLOL TARTRATE 25 MG: 25 TABLET, FILM COATED ORAL at 00:05

## 2023-01-08 RX ADMIN — B-COMPLEX W/ C & FOLIC ACID TAB 1 TABLET: TAB at 08:52

## 2023-01-08 RX ADMIN — PANTOPRAZOLE SODIUM 20 MG: 20 TABLET, DELAYED RELEASE ORAL at 05:38

## 2023-01-08 RX ADMIN — INSULIN LISPRO 2 UNITS: 100 INJECTION, SOLUTION INTRAVENOUS; SUBCUTANEOUS at 21:38

## 2023-01-08 NOTE — ASSESSMENT & PLAN NOTE
Lab Results   Component Value Date    HGBA1C 7 2 (H) 10/20/2022     · Continue Lantus qhs  Add sliding scale insulin (listed humalog allergy although patient has been receiving Humalog throughout hospital stay)  · ADA diet    No results for input(s): POCGLU in the last 72 hours      Blood Sugar Average: Last 72 hrs:

## 2023-01-08 NOTE — ASSESSMENT & PLAN NOTE
· Seen by cardiology at [facility] on Jan 6 for sinus tachycardia  · On Jan 2, patient had a brief period of unresponsiveness and noted with hypotension  Blood pressure improved after holding medications: furosemide, Imdur, metoprolol and tamsulosin  · Patient noted to be tachycardic    ECG on Jan 6: Sinus tachycardia 118, occasional PVC   · Cardiology recommended gradually increasing metoprolol to 37 5mg twice daily and subsequently to 50 mg twice daily if his blood pressure would allow  · Tachycardia multifactorial given fever, blood loss anemia and hypotension  · Will resume metoprolol 25mg BID for now with hold parameters  · EKG today showing sinus rhythm rate 78

## 2023-01-08 NOTE — ASSESSMENT & PLAN NOTE
· Blood culture growing gram-negative rods  · BC ID: Enterobacter cloacae complex  Recommendation for Cefepime  Avoid 1st, 2nd, and 3rd generation cephalosporins  · Likely urinary source   Ucx Dec grew both E cloacae and E faecalis  · Given one dose of IV Zosyn and vancomycin prior to transfer  · Will treat with IV Cefepime 1g BID, renally dosed  · Repeat blood cultures in process  · Follow-up urine culture  · ID consult

## 2023-01-08 NOTE — PROCEDURES
BEDSIDE PROCEDURE  Indwelling Catheter PROCEDURE NOTE    Pre-operative Diagnosis: Bladder cancer and gross hematuria        Post-operative Diagnosis: Same    INDICATION   Lanre Morley  Is a 70-year-old male known to our service with history of bladder cancer, bilateral ureteral obstruction, bilateral PCN and right ureteral stent dependence, admitted with gross hematuria and UTI  Consultation requested to perform Shirley Catheter Placement  TIME OUT: Correct patient identity  PROCEDURE   Consent: Patient was agreeable  Formal  Informed consent however, not applicable  Under sterile conditions the patient was positioned  Betadine solution and sterile drapes were utilized  Non- Petroleum based gel was used to lubricate urethral meatus insertion site  Utilized 20 FR three-way catheter  Urine was obtained without any difficulties and  Without evidence of hematuria or trauma  Findings  200ml sanguinous viscious urine was obtained  Complications:  None; patient tolerated the procedure well  Condition: stable    Plan      · Manually irrigated 20 Spanish Shirley catheter with 500 mL normal saline until clear  · Continue medical optimization and antibiosis  · Trend all labs  · Hold anticoagulation  · Maintain three-way--CBI  · Manual irrigation in between  · Urology will follow and manage urinary drains          EVANGELINA Weller        Attending Attestation: Not Applicable

## 2023-01-08 NOTE — ASSESSMENT & PLAN NOTE
· Chronic bilateral nephrostomy tubes  · Prior to transfer patient reported abdominal pain, CT was done and shows: Stable bilateral percutaneous nephrostomy tubes with interval placement of a right double-J nephroureteral stent  Stable mild right upper pole caliectasis, with resolution of previously seen lower pole caliectasis and hydroureter  Persistent blood   · products in the distal right ureter    No left hydronephrosis  · Continue flushes  · Monitor I&O

## 2023-01-08 NOTE — ASSESSMENT & PLAN NOTE
· BNP >2000  EF 60-65%, normal wall motion  · CT small stable left pleural effusion with drainage catheter in place  · Furosemide was placed on hold Tigre 6 given hypotension    Will resume with hold parameters  · Monitor daily weight

## 2023-01-08 NOTE — ED NOTES
This nurse entered room to administer medication as ordered  Pt's wife was on telephone with her grand daughter was yelling  Pt's grand daughter sound irate on telephone  Pt's wife then silenced phone when this nurse heard the grand daughter yelling  Pt's wife then stated she was going to be "passive aggressive" towards staff  Pt's wife then stated that the staff was delaying the transfer of the pt to be spiteful  This nurse told pt's wife that everything is being done to facilitate a quick transfer  Pt's wife placed the grand daughter on speaker phone who began to yell at this nurse  This nurse left the room        Bisi Higginbotham RN  01/07/23 1929

## 2023-01-08 NOTE — ED NOTES
This RN and Mary, went to discuss p/u time for patient, however, pt and family were told transport time prior to ED staff         Tyree De Paz RN  01/07/23 2429

## 2023-01-08 NOTE — ED NOTES
Patient's primary RN came out of room stating patient's family yelling at her  This RN and provider entered patient's room  Patient, patient's wife and granddaughter concerned and upset about treatment  They state the team has talked to them in a condescending and sarcastic manner  This RN and provider apologetic to family about anything that was said to them that may have come across as condescending  This RN informing patient and patient's family that the provider had already contacted our transport center for a transfer request  While this RN speaking with family patient's wife stood up from chair multiple times and got in providers face and raised her voice  This RN asked if we could all try not to yell and stay professional  Patient's wife states "you have to say that and agree with him"  This RN stressed that per protocol yes we do have to go through the transport process of a request and an accepting provider, and then have to wait for the transport center to contact us with a  time  They expressed understanding  Approximately 10mins later, wife came out of room upset about hgb lab results  This RN informed her that we do have to read the results as they are and can verify them with a redraw, but could not assume results are incorrect  Wife asking if patient is going to get a transfusion  This RN informed wife that I would review the results and as per cardiology note if hgb is below 8 patient would get a transfusion  I informed wife that I would speak with the provider about the results once reviewed  She states "he is too busy with other patient's "and turned away  Patient on speaker phone with a male  The male states "mom let her finish saying what she is saying  You cannot yell at everyone"  This RN spoke with provider, see orders       Wife was updated, patient was cleaned and changed, provided with low sodium chicken soup per request, water, pain meds (see mar), registration informed to please let patient's son back when he arrives  Patient and wife informed  Cristobal Manuel, nursing sup informed of situation  She came down and spoke with patient and patient's wife  Patient and patient family informed that I will be leaving for the day but if anything is needed to please reach out to [facility] charge RN or Annamarie [address]  Expressed understanding        Delia Keller RN  01/07/23 2040       Delia Keller, [address] [place] [address]  01/08/23 2079

## 2023-01-08 NOTE — CONSULTS
Consultation - Infectious Disease   Brandee Desouza 76 y o  male MRN: [id number]  Unit/Bed#: E5 -01 Encounter: [id number]      IMPRESSION & RECOMMENDATIONS:     1  Enterobacter cloacae bacteremia  1 of 2 blood culture sets collected 1/6 showed growth of Enterobacter cloacae  Concern for a urinary source of infection in the setting of bladder cancer with bilateral PCNs and right ureteral stent in place, and intermittent hematuria  UA with innumerable RBC, WBC, bacteria  CT A/P with known malignancy, no hydronephrosis, blood products in the bladder and collecting system  Recent urine culture showed growth of Enterobacter as well  The patient does have a right chest wall Port-A-Cath in place and left pleural catheter, but higher concern for urinary source of infection  He is hemodynamically stable and afebrile here    -Continue IV cefepime 1 g every 12 hours dose adjusted for renal function   -Follow-up urine culture and repeat blood cultures, adjust antibiotics as able   -Shirley catheter and CBI per urology   -Appreciate urology follow-up   -Recommend bilateral PCN exchange while admitted, last exchanged 2 months prior   -If repeat blood cultures positive recommend Port-A-Cath removal    2  Metastatic high-grade treatment refractory bladder cancer status post chemoradiation 2021, currently on immunotherapy with Enfortumab  Complicated by malignant bilateral ureteral obstruction status post bilateral percutaneous nephrostomies and right ureteral stent placement  Has left pleural catheter in place     -Urology follow-up   -Oncology follow-up outpatient    3  CKD stage IV  Creatinine currently at baseline  Dose adjusted antibiotics for creatinine clearance    4  Acute on chronic blood loss anemia  Patient has history of recurrent hematuria due to bladder cancer    Requires intermittent blood transfusions   -Transfusion support per primary team   -Shirley catheter placement and CBI for hematuria   -Monitor hemoglobin   -Oncology follow-up    5  Influenza A  Positive on 1/3/2023, asymptomatic  Repeat testing here is negative    -Continue isolation x7 days    6  Type 2 diabetes mellitus with long-term use of insulin  Glycemic management per primary team    I have discussed the above management plan in detail with the primary service, the patient and his wife at bedside  ID will follow  I have performed an extensive review of the medical records in Epic including review of the notes, radiographs, and laboratory results     HISTORY OF PRESENT ILLNESS:  Reason for Consult: Enterobacter bacteremia  HPI: Ajay Pearson is a 18-year-old man with a history of high-grade treatment refractory bladder cancer status post chemoradiation 2021, currently on immunotherapy with Enfortumab, malignant bilateral ureteral obstruction status post bilateral percutaneous nephrostomies, CKD, anemia  The patient was recently admitted to [facility] for hematuria, underwent cystoscopy, clot evacuation, right retrograde pyelogram and right ureteral stent placement/exchange 12/14/22  Was transferred to [facility] rehab on 12/22/2022  On 1/1/23, an RRT was called due to a brief episode of unresponsiveness, hypotension, anemia  The patient improved with IV fluids, PRBC transfusion, and medication adjustment  He stabilized and was transferred back to [facility] 1/4/23 1/5/2023, influenza A PCR was positive  On 1/6/2023, the patient developed a fever and hematuria  Blood cultures were collected and showed growth of Enterobacter in 1 of 2 sets  UA showed innumerable RBCs, WBC, bacteria  1/7/2023 the patient was transferred to the [facility] for urology and ID evaluations  On admission here, the patient was afebrile without leukocytosis  Repeat influenza PCR was negative  The patient was given IV vancomycin and Zosyn in the [facility] ED but upon transfer was transitioned to IV cefepime    CT A/P shows stable bladder wall thickening compatible with known malignancy, stable bilateral percutaneous nephrostomy tubes, no left hydronephrosis, stable epicardial metastasis and metastatic retroperitoneal lymphadenopathy  Urology was consulted for hematuria  A wagner catheter was inserted and CBI initiated  ID is consulted for antibiotic management  On evaluation, the patient reports pain in his back around the percutaneous nephrostomy tubes, as well as bladder spasms  He denies fevers but reports chills  The patient does have a right chest wall port in place and there is no surrounding tenderness  Review of previous urine culture from December shows growth of Enterobacter cloacae and Enterococcus faecalis  The patient was previously on suppressive Bactrim for recurrent UTIs, but this was held during his recent admission to [place] due to his CKD  REVIEW OF SYSTEMS:  A complete review of systems is negative other than that noted in the HPI      PAST MEDICAL HISTORY:  Past Medical History:   Diagnosis Date   • Anemia     Last assessed: 9/28/17   • Anxiety    • Arteriosclerotic cardiovascular disease     Last assessed: 9/28/17   • Arthritis    • [facility]adder [facility] )     bladder- had BCG treatments   • Chronic kidney disease     Stage IV   • CKD (chronic kidney disease) stage 4, GFR 15-29 ml/min ([facility])    • Colon polyp    • Coronary artery disease     7 stents   • Depression    • Diabetes mellitus ([facility])     IDDM   • GERD (gastroesophageal reflux disease)    • Glaucoma    • Hematuria    • History of fusion of cervical spine    • Hyperlipidemia    • Hypertension    • Insomnia     Last assessed: 11/14/12   • Loss of hearing     has hearing aids but usually does not wear them   • Lung cancer [facility])    • Metastatic cancer ([facility] )    • Other seasonal allergic rhinitis     Last assessed: 2/10/16   • PAD (peripheral artery disease) ([facility])    • Shortness of breath     on exertion   • Spinal stenosis of lumbar region    • Transient cerebral ischemia     No Residual   • Uses walker     w/c for longer distances     Past Surgical History:   Procedure Laterality Date   • CARDIAC SURGERY      Cath stent placement  Last assessed: 3/9/17  Interventional Catheterization   • CHOLECYSTECTOMY     • COLONOSCOPY     • CYSTOSCOPY      Diagnostic w/biopsy  Sulma Lynch  Last assessed: 12/1/14   • CYSTOSCOPY N/A 04/12/2022    Procedure: CYSTOSCOPY  Bladder biopsies  ;  Surgeon: Essence Gary MD;  [facility];  Service: Urology   • CYSTOSCOPY W/ RETROGRADES Right 03/01/2022    Procedure: CYSTO; stent removal retrograde;  Surgeon: Essence Gary MD;  [facility];  Service: Urology   • CYSTOSCOPY W/ URETERAL STENT PLACEMENT Bilateral 10/18/2021    Procedure: bilateral retrogrades, cytology collection;  Surgeon: Essence Gary MD;  [facility];  Service: Urology   • CYSTOURETHROSCOPY      w/cautery  Sulma Lynch   • FL RETROGRADE PYELOGRAM  10/18/2021   • FL RETROGRADE PYELOGRAM  10/24/2021   • FL RETROGRADE PYELOGRAM  12/14/2022   • GASTRIC BYPASS      For morbid obesity w/Shaji-en-Y   Resolved: 11/17/09   • INCISION AND DRAINAGE OF WOUND Right 02/26/2017    Procedure: INCISION AND DRAINAGE (I&D) EXTREMITY WITH APPLICATION OF GRAFT JACKET;  Surgeon: Britney Dewitt DPM;  [facility];  Service:    • INCISION AND DRAINAGE OF WOUND Right 04/25/2017    Procedure: INCISION AND DRAINAGE (I&D) EXTREMITY, APPLICATION OF GRAFT;  Surgeon: Britney Dewitt DPM;  [facility];  Service:    • IR BIOPSY OTHER  07/02/2020   • IR LOWER EXTREMITY ANGIOGRAM  02/08/2021   • IR LOWER EXTREMITY ANGIOGRAM  02/11/2021   • IR NEPHROSTOMY TUBE CHECK/CHANGE/REPOSITION/REINSERTION/UPSIZE  04/28/2022   • IR NEPHROSTOMY TUBE CHECK/CHANGE/REPOSITION/REINSERTION/UPSIZE  05/24/2022   • IR NEPHROSTOMY TUBE CHECK/CHANGE/REPOSITION/REINSERTION/UPSIZE  06/07/2022   • IR NEPHROSTOMY TUBE CHECK/CHANGE/REPOSITION/REINSERTION/UPSIZE  07/28/2022 • IR NEPHROSTOMY TUBE CHECK/CHANGE/REPOSITION/REINSERTION/UPSIZE  11/15/2022   • IR NEPHROSTOMY TUBE PLACEMENT  02/25/2022   • IR PORT PLACEMENT  01/17/2022   • IR THORACENTESIS  09/02/2022   • IR THORACENTESIS  09/14/2022   • IR THORACENTESIS WITH TUBE PLACEMENT Left     october   • IR TUNNELED CENTRAL LINE PLACEMENT  12/24/2020   • JOINT REPLACEMENT      christofer knees replaced   • LA AMPUTATION METATARSAL W/TOE SINGLE Left 12/21/2020    Procedure: RAY RESECTION FOOT;  Surgeon: Natacha Babcock DPM;  [facility];  Service: Podiatry   • LA AMPUTATION METATARSAL W/TOE SINGLE Left 12/31/2020    Procedure: 5TH MET RESECTION;  Surgeon: Natacha Babcock DPM;  [facility];  Service: Podiatry   • LA CYSTO BLADDER W/URETERAL CATHETERIZATION Right 12/14/2022    Procedure: CYSTOSCOPY WITH RETROGRADE PYELOGRAM and CLOT EVACUATION, RIGHT STENT INSERTION, FULGRATION OF BLEEDING POINTS;  Surgeon: Heidi Bay MD;  [facility];  Service: Urology   • LA CYSTO W/IRRIG & EVAC MULTPLE OBSTRUCTING CLOTS N/A 02/10/2021    Procedure: CYSTOSCOPY EVACUATION OF CLOTS, fulguration;  Surgeon: Desiree Kelly MD;  [facility];  Service: Urology   • LA CYSTO W/IRRIG & EVAC MULTPLE OBSTRUCTING CLOTS N/A 10/24/2021    Procedure: CYSTOSCOPY EVACUATION OF CLOT, fulguration of bleeding vessels, right ureter stent placement, retrograde pyelogram;  Surgeon: Tyrel Gomez MD;  [facility];  Service: Urology   • LA CYSTO W/REMOVAL OF LESIONS SMALL N/A 11/19/2020    Procedure: CYSTO W/BIOPSIES, transurethral prostate bx;  Surgeon: Radha Campos MD;  [facility];  Service: Urology   • LA CYSTOURETHROSCOPY W/DEST &/RMVL TUMOR LARGE Bilateral 10/18/2021    Procedure: TRANSURETHRAL RESECTION OF BLADDER TUMOR (TURBT);   Surgeon: Harry Breen MD;  [facility];  Service: Urology   • LA CYSTOURETHROSCOPY WITH BIOPSY N/A 08/16/2016    Procedure: CYSTOSCOPY WITH BIOPSIES;  Surgeon: Tarik Ge Delia Roberts MD;  [facility];  Service: Urology   • OH Balaji Romero 3RD+ ORD SLCTV ABDL PEL/LXTR [address] [facility] Left 2021    Procedure: LEG angiogram, CO2 w/limited contrast with balloon angioplasty postertior tibial artery;  Surgeon: Ammy Reyes MD;  [facility];  Service: Vascular   • ROTATOR CUFF REPAIR     • SMALL INTESTINE SURGERY      Surgery Shaji-en-Y   • SPINAL FUSION      lumbar and cervical fusions   • VAC DRESSING APPLICATION Right     Procedure: APPLICATION VAC DRESSING;  Surgeon: Bonita Baron DPM;  [facility];  Service:    • WOUND DEBRIDEMENT Left 2021    Procedure: FOOT DEBRIDE, [address] OUT w/graft application;  Surgeon: Bonita Baron DPM;  [facility];  Service: Podiatry       FAMILY HISTORY:  Non-contributory    SOCIAL HISTORY:  Social History   Social History     Substance and Sexual Activity   Alcohol Use Never    Comment: beer / liquor     Social History     Substance and Sexual Activity   Drug Use Not Currently   • Types: Marijuana    Comment: quit 2019 had medical marijuana     Social History     Tobacco Use   Smoking Status Former   • Packs/day: 3 00   • Years: 27 00   • Pack years: 81 00   • Types: Cigarettes   • Quit date: 1970   • Years since quittin 0   Smokeless Tobacco Never       ALLERGIES:  Allergies   Allergen Reactions   • Atorvastatin Hives, Itching and Rash   • Simvastatin Rash and Edema     Edema of lower legs   • Statins Hives and Itching   • Insulin Lispro Swelling and Edema     " Lower Legs"   • Other Itching, Rash and Other (See Comments)     "EKG Patches"   "blue EKG patches"       MEDICATIONS:  All current active medications have been reviewed      PHYSICAL EXAM:  Temp:  [97 6 °F (36 4 °C)-98 8 °F (37 1 °C)] 98 4 °F (36 9 °C)  HR:  [78-83] 81  Resp:  [14-20] 14  BP: (115-133)/(66-77) 133/77  SpO2:  [95 %-100 %] 96 %  Temp (24hrs), Av 2 °F (36 8 °C), Min:97 6 °F (36 4 °C), Max:98 8 °F (37 1 °C)  Current: Temperature: 98 4 °F (36 9 °C)    Intake/Output Summary (Last 24 hours) at 1/8/2023 1716  Last data filed at 1/8/2023 1626  Gross per 24 hour   Intake 990 ml   Output 37111 ml   Net -78817 ml       General Appearance:   Frail appearing but appears nontoxic   Head:  Normocephalic, without obvious abnormality, atraumatic   Eyes:  Conjunctiva pink and sclera anicteric, both eyes   Nose: Nares normal, mucosa normal, no drainage   Throat: Oropharynx moist without lesions   Neck: Supple, symmetrical, no adenopathy, no tenderness/mass/nodules   Back:   Symmetric, no curvature, ROM normal   Lungs:   Clear to auscultation bilaterally, respirations unlabored   Chest Wall:  No tenderness or deformity  Right chest wall port   Heart:  RRR; no murmur, rub or gallop   Abdomen/:   Soft, non-tender, non-distended, positive bowel sounds  Bilateral percutaneous nephrostomy tubes, tenderness around the nephrostomy tubes  Shirley catheter in place with yellow urine, CBI running   Extremities: No cyanosis, clubbing or edema   Skin: No rashes or lesions  No draining wounds noted  Lymph nodes: Cervical, supraclavicular nodes normal   Neurologic: Alert and oriented times 3, extremity strength 5/5 and symmetric       LABS, IMAGING, & OTHER STUDIES:  Lab Results:  I have personally reviewed pertinent labs    Results from last 7 days   Lab Units 01/08/23  0539 01/07/23  1907 01/07/23  1613   WBC Thousand/uL 7 39 9 78 9 17   HEMOGLOBIN g/dL 7 7* 7 1* 8 3*   PLATELETS Thousands/uL 157 168 196     Results from last 7 days   Lab Units 01/08/23  0539 01/07/23  1613 01/07/23  1114 01/06/23  0704   SODIUM mmol/L 137 135 135 137   POTASSIUM mmol/L 4 8 5 5* 4 7 4 5   CHLORIDE mmol/L 103 101 103 103   CO2 mmol/L 25 25 26 27   BUN mg/dL 53* 57* 52* 51*   CREATININE mg/dL 3 18* 3 21* 3 21* 3 07*   EGFR ml/min/1 73sq m 17 17 17 18   CALCIUM mg/dL 8 4 8 9 8 7 8 6   AST U/L  --  34 27 42   ALT U/L  --  24 23 29   ALK PHOS U/L  --  96 92 106     Results from last 7 days   Lab Units 01/07/23  1613 01/07/23  1611 01/06/23  2030 01/06/23  1530   BLOOD CULTURE  Received in Microbiology Lab  Culture in Progress  Received in Microbiology Lab  Culture in Progress  Enterobacter cloacae*  --    GRAM STAIN RESULT   --   --  Gram negative rods*  --    URINE CULTURE   --   --   --  Culture results to follow  Results from last 7 days   Lab Units 01/07/23  1114 01/06/23  0704   PROCALCITONIN ng/ml 0 65* 0 47*         Results from last 7 days   Lab Units 01/07/23  1613   FERRITIN ng/mL 217           Imaging Studies:   I have personally reviewed pertinent imaging study reports and images in PACS  CT A/P without contrast: Stable bladder wall thickening, hyperdensity within the bladder lumen  Stable bilateral percutaneous nephrostomy tubes    Other Studies:   I have personally reviewed pertinent reports

## 2023-01-08 NOTE — ED NOTES
Pt and family made aware that patient will be p/u appr  2200 via [employer] critical care truck due to pt receiving blood products at time of transport        Debi Torres RN  01/07/23 7274

## 2023-01-08 NOTE — PLAN OF CARE
Problem: Nutrition/Hydration-ADULT  Goal: Nutrient/Hydration intake appropriate for improving, restoring or maintaining nutritional needs  Description: Monitor and assess patient's nutrition/hydration status for malnutrition  Collaborate with interdisciplinary team and initiate plan and interventions as ordered  Monitor patient's weight and dietary intake as ordered or per policy  Utilize nutrition screening tool and intervene as necessary  Determine patient's food preferences and provide high-protein, high-caloric foods as appropriate       INTERVENTIONS:  - Monitor oral intake, urinary output, labs, and treatment plans  - Assess nutrition and hydration status and recommend course of action  - Evaluate amount of meals eaten  - Assist patient with eating if necessary   - Allow adequate time for meals  - Recommend/ encourage appropriate diets, oral nutritional supplements, and vitamin/mineral supplements  - Order, calculate, and assess calorie counts as needed  - Recommend, monitor, and adjust tube feedings and TPN/PPN based on assessed needs  - Assess need for intravenous fluids  - Provide specific nutrition/hydration education as appropriate  - Include patient/family/caregiver in decisions related to nutrition  Outcome: Progressing     Problem: Prexisting or High Potential for Compromised Skin Integrity  Goal: Skin integrity is maintained or improved  Description: INTERVENTIONS:  - Identify patients at risk for skin breakdown  - Assess and monitor skin integrity  - Assess and monitor nutrition and hydration status  - Monitor labs   - Assess for incontinence   - Turn and reposition patient  - Assist with mobility/ambulation  - Relieve pressure over bony prominences  - Avoid friction and shearing  - Provide appropriate hygiene as needed including keeping skin clean and dry  - Evaluate need for skin moisturizer/barrier cream  - Collaborate with interdisciplinary team   - Patient/family teaching  - Consider wound care consult   Outcome: Progressing     Problem: MOBILITY - ADULT  Goal: Maintain or return to baseline ADL function  Description: INTERVENTIONS:  -  Assess patient's ability to carry out ADLs; assess patient's baseline for ADL function and identify physical deficits which impact ability to perform ADLs (bathing, care of mouth/teeth, toileting, grooming, dressing, etc )  - Assess/evaluate cause of self-care deficits   - Assess range of motion  - Assess patient's mobility; develop plan if impaired  - Assess patient's need for assistive devices and provide as appropriate  - Encourage maximum independence but intervene and supervise when necessary  - Involve family in performance of ADLs  - Assess for home care needs following discharge   - Consider OT consult to assist with ADL evaluation and planning for discharge  - Provide patient education as appropriate  Outcome: Progressing  Goal: Maintains/Returns to pre admission functional level  Description: INTERVENTIONS:  - Perform BMAT or MOVE assessment daily    - Set and communicate daily mobility goal to care team and patient/family/caregiver  - Collaborate with rehabilitation services on mobility goals if consulted  - Perform Range of Motion 3 times a day  - Reposition patient every 2 hours    - Dangle patient 3 times a day  - Stand patient 3 times a day  - Ambulate patient 3 times a day  - Out of bed to chair 3 times a day   - Out of bed for meals 3 times a day  - Out of bed for toileting  - Record patient progress and toleration of activity level   Outcome: Progressing     Problem: Potential for Falls  Goal: Patient will remain free of falls  Description: INTERVENTIONS:  - Educate patient/family on patient safety including physical limitations  - Instruct patient to call for assistance with activity   - Consult OT/PT to assist with strengthening/mobility   - Keep Call bell within reach  - Keep bed low and locked with side rails adjusted as appropriate  - Keep care items and personal belongings within reach  - Initiate and maintain comfort rounds  - Make Fall Risk Sign visible to staff  - Offer Toileting every 2 Hours, in advance of need  - Initiate/Maintain bed alarm  - Obtain necessary fall risk management equipment: bed alarm  - Apply yellow socks and bracelet for high fall risk patients  - Consider moving patient to room near nurses station  Outcome: Progressing     Problem: PAIN - ADULT  Goal: Verbalizes/displays adequate comfort level or baseline comfort level  Description: Interventions:  - Encourage patient to monitor pain and request assistance  - Assess pain using appropriate pain scale  - Administer analgesics based on type and severity of pain and evaluate response  - Implement non-pharmacological measures as appropriate and evaluate response  - Consider cultural and social influences on pain and pain management  - Notify physician/advanced practitioner if interventions unsuccessful or patient reports new pain  Outcome: Progressing     Problem: INFECTION - ADULT  Goal: Absence or prevention of progression during hospitalization  Description: INTERVENTIONS:  - Assess and monitor for signs and symptoms of infection  - Monitor lab/diagnostic results  - Monitor all insertion sites, i e  indwelling lines, tubes, and drains  - Monitor endotracheal if appropriate and nasal secretions for changes in amount and color  - [place] appropriate cooling/warming therapies per order  - Administer medications as ordered  - Instruct and encourage patient and family to use good hand hygiene technique  - Identify and instruct in appropriate isolation precautions for identified infection/condition  Outcome: Progressing  Goal: Absence of fever/infection during neutropenic period  Description: INTERVENTIONS:  - Monitor WBC    Outcome: Progressing     Problem: SAFETY ADULT  Goal: Maintain or return to baseline ADL function  Description: INTERVENTIONS:  -  Assess patient's ability to carry out ADLs; assess patient's baseline for ADL function and identify physical deficits which impact ability to perform ADLs (bathing, care of mouth/teeth, toileting, grooming, dressing, etc )  - Assess/evaluate cause of self-care deficits   - Assess range of motion  - Assess patient's mobility; develop plan if impaired  - Assess patient's need for assistive devices and provide as appropriate  - Encourage maximum independence but intervene and supervise when necessary  - Involve family in performance of ADLs  - Assess for home care needs following discharge   - Consider OT consult to assist with ADL evaluation and planning for discharge  - Provide patient education as appropriate  Outcome: Progressing  Goal: Maintains/Returns to pre admission functional level  Description: INTERVENTIONS:  - Perform BMAT or MOVE assessment daily    - Set and communicate daily mobility goal to care team and patient/family/caregiver  - Collaborate with rehabilitation services on mobility goals if consulted  - Perform Range of Motion 3 times a day  - Reposition patient every 2 hours    - Dangle patient 3 times a day  - Stand patient 3 times a day  - Ambulate patient 3 times a day  - Out of bed to chair 3 times a day   - Out of bed for meals 3 times a day  - Out of bed for toileting  - Record patient progress and toleration of activity level   Outcome: Progressing  Goal: Patient will remain free of falls  Description: INTERVENTIONS:  - Educate patient/family on patient safety including physical limitations  - Instruct patient to call for assistance with activity   - Consult OT/PT to assist with strengthening/mobility   - Keep Call bell within reach  - Keep bed low and locked with side rails adjusted as appropriate  - Keep care items and personal belongings within reach  - Initiate and maintain comfort rounds  - Make Fall Risk Sign visible to staff  - Offer Toileting every 2 Hours, in advance of need  - Initiate/Maintain bed alarm  - Obtain necessary fall risk management equipment: bed adarm  - Apply yellow socks and bracelet for high fall risk patients  - Consider moving patient to room near nurses station  Outcome: Progressing     Problem: GENITOURINARY - ADULT  Goal: Maintains or returns to baseline urinary function  Description: INTERVENTIONS:  - Assess urinary function  - Encourage oral fluids to ensure adequate hydration if ordered  - Administer IV fluids as ordered to ensure adequate hydration  - Administer ordered medications as needed  - Offer frequent toileting  - Follow urinary retention protocol if ordered  Outcome: Progressing  Goal: Absence of urinary retention  Description: INTERVENTIONS:  - Assess patient’s ability to void and empty bladder  - Monitor I/O  - Bladder scan as needed  - Discuss with physician/AP medications to alleviate retention as needed  - Discuss catheterization for long term situations as appropriate  Outcome: Progressing  Goal: Urinary catheter remains patent  Description: INTERVENTIONS:  - Assess patency of urinary catheter  - If patient has a chronic wagner, consider changing catheter if non-functioning  - Follow guidelines for intermittent irrigation of non-functioning urinary catheter  Outcome: Progressing     Problem: HEMATOLOGIC - ADULT  Goal: Maintains hematologic stability  Description: INTERVENTIONS  - Assess for signs and symptoms of bleeding or hemorrhage  - Monitor labs  - Administer supportive blood products/factors as ordered and appropriate  Outcome: Progressing     Problem: MUSCULOSKELETAL - ADULT  Goal: Maintain or return mobility to safest level of function  Description: INTERVENTIONS:  - Assess patient's ability to carry out ADLs; assess patient's baseline for ADL function and identify physical deficits which impact ability to perform ADLs (bathing, care of mouth/teeth, toileting, grooming, dressing, etc )  - Assess/evaluate cause of self-care deficits   - Assess range of motion  - Assess patient's mobility  - Assess patient's need for assistive devices and provide as appropriate  - Encourage maximum independence but intervene and supervise when necessary  - Involve family in performance of ADLs  - Assess for home care needs following discharge   - Consider OT consult to assist with ADL evaluation and planning for discharge  - Provide patient education as appropriate  Outcome: Progressing  Goal: Maintain proper alignment of affected body part  Description: INTERVENTIONS:  - Support, maintain and protect limb and body alignment  - Provide patient/ family with appropriate education  Outcome: Progressing

## 2023-01-08 NOTE — ASSESSMENT & PLAN NOTE
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

## 2023-01-08 NOTE — PLAN OF CARE
Problem: Nutrition/Hydration-ADULT  Goal: Nutrient/Hydration intake appropriate for improving, restoring or maintaining nutritional needs  Description: Monitor and assess patient's nutrition/hydration status for malnutrition  Collaborate with interdisciplinary team and initiate plan and interventions as ordered  Monitor patient's weight and dietary intake as ordered or per policy  Utilize nutrition screening tool and intervene as necessary  Determine patient's food preferences and provide high-protein, high-caloric foods as appropriate       INTERVENTIONS:  - Monitor oral intake, urinary output, labs, and treatment plans  - Assess nutrition and hydration status and recommend course of action  - Evaluate amount of meals eaten  - Assist patient with eating if necessary   - Allow adequate time for meals  - Recommend/ encourage appropriate diets, oral nutritional supplements, and vitamin/mineral supplements  - Order, calculate, and assess calorie counts as needed  - Recommend, monitor, and adjust tube feedings and TPN/PPN based on assessed needs  - Assess need for intravenous fluids  - Provide specific nutrition/hydration education as appropriate  - Include patient/family/caregiver in decisions related to nutrition  Outcome: Progressing     Problem: Prexisting or High Potential for Compromised Skin Integrity  Goal: Skin integrity is maintained or improved  Description: INTERVENTIONS:  - Identify patients at risk for skin breakdown  - Assess and monitor skin integrity  - Assess and monitor nutrition and hydration status  - Monitor labs   - Assess for incontinence   - Turn and reposition patient  - Assist with mobility/ambulation  - Relieve pressure over bony prominences  - Avoid friction and shearing  - Provide appropriate hygiene as needed including keeping skin clean and dry  - Evaluate need for skin moisturizer/barrier cream  - Collaborate with interdisciplinary team   - Patient/family teaching  - Consider wound care consult   Outcome: Progressing     Problem: MOBILITY - ADULT  Goal: Maintain or return to baseline ADL function  Description: INTERVENTIONS:  -  Assess patient's ability to carry out ADLs; assess patient's baseline for ADL function and identify physical deficits which impact ability to perform ADLs (bathing, care of mouth/teeth, toileting, grooming, dressing, etc )  - Assess/evaluate cause of self-care deficits   - Assess range of motion  - Assess patient's mobility; develop plan if impaired  - Assess patient's need for assistive devices and provide as appropriate  - Encourage maximum independence but intervene and supervise when necessary  - Involve family in performance of ADLs  - Assess for home care needs following discharge   - Consider OT consult to assist with ADL evaluation and planning for discharge  - Provide patient education as appropriate  Outcome: Progressing  Goal: Maintains/Returns to pre admission functional level  Description: INTERVENTIONS:  - Perform BMAT or MOVE assessment daily    - Set and communicate daily mobility goal to care team and patient/family/caregiver  - Collaborate with rehabilitation services on mobility goals if consulted  - Perform Range of Motion 3 times a day  - Reposition patient every 2 hours    - Dangle patient 3 times a day  - Stand patient 3 times a day  - Ambulate patient 3 times a day  - Out of bed to chair 3 times a day   - Out of bed for meals 3 times a day  - Out of bed for toileting  - Record patient progress and toleration of activity level   Outcome: Progressing     Problem: Potential for Falls  Goal: Patient will remain free of falls  Description: INTERVENTIONS:  - Educate patient/family on patient safety including physical limitations  - Instruct patient to call for assistance with activity   - Consult OT/PT to assist with strengthening/mobility   - Keep Call bell within reach  - Keep bed low and locked with side rails adjusted as appropriate  - Keep care items and personal belongings within reach  - Initiate and maintain comfort rounds  - Make Fall Risk Sign visible to staff  - Offer Toileting every 2 Hours, in advance of need  - Initiate/Maintain bed alarm  - Apply yellow socks and bracelet for high fall risk patients  - Consider moving patient to room near nurses station  Outcome: Progressing     Problem: PAIN - ADULT  Goal: Verbalizes/displays adequate comfort level or baseline comfort level  Description: Interventions:  - Encourage patient to monitor pain and request assistance  - Assess pain using appropriate pain scale  - Administer analgesics based on type and severity of pain and evaluate response  - Implement non-pharmacological measures as appropriate and evaluate response  - Consider cultural and social influences on pain and pain management  - Notify physician/advanced practitioner if interventions unsuccessful or patient reports new pain  Outcome: Progressing     Problem: INFECTION - ADULT  Goal: Absence or prevention of progression during hospitalization  Description: INTERVENTIONS:  - Assess and monitor for signs and symptoms of infection  - Monitor lab/diagnostic results  - Monitor all insertion sites, i e  indwelling lines, tubes, and drains  - Monitor endotracheal if appropriate and nasal secretions for changes in amount and color  - [place] appropriate cooling/warming therapies per order  - Administer medications as ordered  - Instruct and encourage patient and family to use good hand hygiene technique  - Identify and instruct in appropriate isolation precautions for identified infection/condition  Outcome: Progressing  Goal: Absence of fever/infection during neutropenic period  Description: INTERVENTIONS:  - Monitor WBC    Outcome: Progressing     Problem: SAFETY ADULT  Goal: Maintain or return to baseline ADL function  Description: INTERVENTIONS:  -  Assess patient's ability to carry out ADLs; assess patient's baseline for ADL function and identify physical deficits which impact ability to perform ADLs (bathing, care of mouth/teeth, toileting, grooming, dressing, etc )  - Assess/evaluate cause of self-care deficits   - Assess range of motion  - Assess patient's mobility; develop plan if impaired  - Assess patient's need for assistive devices and provide as appropriate  - Encourage maximum independence but intervene and supervise when necessary  - Involve family in performance of ADLs  - Assess for home care needs following discharge   - Consider OT consult to assist with ADL evaluation and planning for discharge  - Provide patient education as appropriate  Outcome: Progressing  Goal: Maintains/Returns to pre admission functional level  Description: INTERVENTIONS:  - Perform BMAT or MOVE assessment daily    - Set and communicate daily mobility goal to care team and patient/family/caregiver  - Collaborate with rehabilitation services on mobility goals if consulted  - Perform Range of Motion 3 times a day  - Reposition patient every 2 hours    - Dangle patient 3 times a day  - Stand patient 3 times a day  - Ambulate patient 3 times a day  - Out of bed to chair 3 times a day   - Out of bed for meals 3 times a day  - Out of bed for toileting  - Record patient progress and toleration of activity level   Outcome: Progressing  Goal: Patient will remain free of falls  Description: INTERVENTIONS:  - Educate patient/family on patient safety including physical limitations  - Instruct patient to call for assistance with activity   - Consult OT/PT to assist with strengthening/mobility   - Keep Call bell within reach  - Keep bed low and locked with side rails adjusted as appropriate  - Keep care items and personal belongings within reach  - Initiate and maintain comfort rounds  - Make Fall Risk Sign visible to staff  - Offer Toileting every 2 Hours, in advance of need  - Initiate/Maintain bed alarm  - Apply yellow socks and bracelet for high fall risk patients  - Consider moving patient to room near nurses station  Outcome: Progressing     Problem: GENITOURINARY - ADULT  Goal: Maintains or returns to baseline urinary function  Description: INTERVENTIONS:  - Assess urinary function  - Encourage oral fluids to ensure adequate hydration if ordered  - Administer IV fluids as ordered to ensure adequate hydration  - Administer ordered medications as needed  - Offer frequent toileting  - Follow urinary retention protocol if ordered  Outcome: Progressing  Goal: Absence of urinary retention  Description: INTERVENTIONS:  - Assess patient’s ability to void and empty bladder  - Monitor I/O  - Bladder scan as needed  - Discuss with physician/AP medications to alleviate retention as needed  - Discuss catheterization for long term situations as appropriate  Outcome: Progressing  Goal: Urinary catheter remains patent  Description: INTERVENTIONS:  - Assess patency of urinary catheter  - If patient has a chronic wagner, consider changing catheter if non-functioning  - Follow guidelines for intermittent irrigation of non-functioning urinary catheter  Outcome: Progressing     Problem: HEMATOLOGIC - ADULT  Goal: Maintains hematologic stability  Description: INTERVENTIONS  - Assess for signs and symptoms of bleeding or hemorrhage  - Monitor labs  - Administer supportive blood products/factors as ordered and appropriate  Outcome: Progressing     Problem: MUSCULOSKELETAL - ADULT  Goal: Maintain or return mobility to safest level of function  Description: INTERVENTIONS:  - Assess patient's ability to carry out ADLs; assess patient's baseline for ADL function and identify physical deficits which impact ability to perform ADLs (bathing, care of mouth/teeth, toileting, grooming, dressing, etc )  - Assess/evaluate cause of self-care deficits   - Assess range of motion  - Assess patient's mobility  - Assess patient's need for assistive devices and provide as appropriate  - Encourage maximum independence but intervene and supervise when necessary  - Involve family in performance of ADLs  - Assess for home care needs following discharge   - Consider OT consult to assist with ADL evaluation and planning for discharge  - Provide patient education as appropriate  Outcome: Progressing  Goal: Maintain proper alignment of affected body part  Description: INTERVENTIONS:  - Support, maintain and protect limb and body alignment  - Provide patient/ family with appropriate education  Outcome: Progressing

## 2023-01-08 NOTE — ED NOTES
This nurse in room to speak with patient and significant other about transport  Family in understanding as well as patient  It was expressed that as soon as the staff fas information regarding pt's transport that I will update pt and family       Shira Parada RN  01/07/23 2030

## 2023-01-08 NOTE — PROGRESS NOTES
Progress Note - Eloy Green 76 y o  male MRN: [id number]    Unit/Bed#: E5 -01 Encounter: [id number]      Subjective: The patient feels weak but otherwise well  He slept okay  He ate well  He has no chest pain, shortness of breath, abdominal pain, nausea, or vomiting  Physical Exam:   Temp:  [97 6 °F (36 4 °C)-98 8 °F (37 1 °C)] 98 8 °F (37 1 °C)  HR:  [78-83] 83  Resp:  [16-20] 18  BP: (115-158)/(66-74) 115/67    Gen: Well-developed, frail, in no distress  Neck: Supple  No lymphadenopathy, goiter, or bruit  Heart: Regular rhythm  I heard no murmur, gallop, or rub  Lungs: Clear to auscultation and percussion  No wheezing, rales, or rhonchi  Abd: Soft with active bowel sounds  Bilateral nephrostomies are present  There is no tenderness  Extremities: No clubbing, cyanosis, or edema  No calf tenderness  Neuro: Alert and oriented  No focal sign  Skin: Warm and dry  LABS:   CBC:   Lab Results   Component Value Date    WBC 7 39 01/08/2023    HGB 7 7 (L) 01/08/2023    HCT 23 6 (L) 01/08/2023    MCV 93 01/08/2023     01/08/2023    MCH 30 4 01/08/2023    MCHC 32 6 01/08/2023    RDW 15 5 (H) 01/08/2023    MPV 10 2 01/08/2023    NRBC 0 01/08/2023   , CMP:   Lab Results   Component Value Date    SODIUM 137 01/08/2023    K 4 8 01/08/2023     01/08/2023    CO2 25 01/08/2023    BUN 53 (H) 01/08/2023    CREATININE 3 18 (H) 01/08/2023    CALCIUM 8 4 01/08/2023    AST 34 01/07/2023    ALT 24 01/07/2023    ALKPHOS 96 01/07/2023    EGFR 17 01/08/2023             Assessment/Plan:  1  Enterobacter bacteremia  2  Chronic kidney disease stage IV  3  Type 2 diabetes  4  History of bladder cancer  5  Coronary artery disease  6  Peripheral vascular disease  7  Elevated BNP and pleural effusion  8  Blood loss anemia  9  Recent influenza  10  Gross hematuria    The patient is on cefepime for Enterobacter  Sensitivities are currently pending  This will be continued for now    The patient's creatinine is roughly at baseline  We will continue his current diabetic management  He has no angina  He has no overt sign of congestive heart failure despite his elevated BNP  We will continue to monitor his hemoglobin and electrolytes carefully      VTE Pharmacologic Prophylaxis: Reason for no pharmacologic prophylaxis Hematuria  VTE Mechanical Prophylaxis: sequential compression device

## 2023-01-08 NOTE — ASSESSMENT & PLAN NOTE
· Chronic pleural effusion  CT showing Stable small left pleural effusion with drainage catheter in place    Stable epicardial metastasis

## 2023-01-08 NOTE — ASSESSMENT & PLAN NOTE
· Patient has persistent hematuria due to bladder cancer  History of recurrent UTI on suppressive Bactrim  · CT A/P: Stable bladder wall thickening, compatible with known malignancy  Increased hyperdensity within the bladder lumen, compatible with new blood clot  · Recent admission at [facility] 12/12-12/22 for hematuria and obstructive uropathy in the setting of known bladder cancer s/p BCG and subsequent chemotherapy/radiation s/p bilateral chronic PCN tubes   Noted to have new right-sided hydronephrosis on CT s/p cystoscopy, clot evacuation w/ right ureteral stenting on 12/14 by Dr Elida Soliman  · UA bloody, innumerable RBC/WBC, +leuks, no nitrites  · Continue flushes  · Monitor I&O  · Monitor Hg  · Follow up urine culture, collected prior to transfer  · Urology consult

## 2023-01-08 NOTE — ASSESSMENT & PLAN NOTE
Lab Results   Component Value Date    EGFR 17 01/07/2023    EGFR 17 01/07/2023    EGFR 18 01/06/2023    CREATININE 3 21 (H) 01/07/2023    CREATININE 3 21 (H) 01/07/2023    CREATININE 3 07 (H) 01/06/2023     · Chronic CKD 4 at baseline    Monitor BMP

## 2023-01-08 NOTE — ASSESSMENT & PLAN NOTE
· Patient had episode of unresponsiveness with hypotension on Jan 2  He was sent from [facility] to ER  · Noted with anemia  Received IV bolus in ED and 2u PRBC    Improved and medically cleared for return to [facility]   · Close BP monitoring

## 2023-01-08 NOTE — ASSESSMENT & PLAN NOTE
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

## 2023-01-08 NOTE — ASSESSMENT & PLAN NOTE
· Patient tested + for influenza A prior to discharge to [facility] on [address]  · Asymptomatic therefore not treated   · Negative flu on Jan 7

## 2023-01-08 NOTE — ED NOTES
Washed patients extremities and changed brief and bedding for patient at 7:50 pm with Helen Diallo  01/07/23 1954

## 2023-01-08 NOTE — EMTALA/ACUTE CARE TRANSFER
[facility] EMERGENCY DEPARTMENT  [address] [facility] [address]  [phone]  Dept: [address] [facility] TRANSFER CONSENT    NAME Alvarez Diallo                                         1945                              MRN [id number]    I have been informed of my rights regarding examination, treatment, and transfer   by Dr Christine Stephens MD    Benefits: Specialized equipment and/or services available at the receiving facility (Include comment)________________________    Risks: Potential for delay in receiving treatment      Transfer Request   I acknowledge that my medical condition has been evaluated and explained to me by the emergency department physician or other qualified medical person and/or my attending physician who has recommended and offered to me further medical examination and treatment  I understand the Hospital's obligation with respect to the treatment and stabilization of my emergency medical condition  I nevertheless request to be transferred  I release the Hospital, the doctor, and any other persons caring for me from all responsibility or liability for any injury or ill effects that may result from my transfer and agree to accept all responsibility for the consequences of my choice to transfer, rather than receive stabilizing treatment at the Hospital  I understand that because the transfer is my request, my insurance may not provide reimbursement for the services  The Hospital will assist and direct me and my family in how to make arrangements for transfer, but the hospital is not liable for any fees charged by the transport service  In spite of this understanding, I refuse to consent to further medical examination and treatment which has been offered to me, and request transfer to  [facility] Name, [address] : Cristhian Ospina   I authorize the performance of emergency medical procedures and treatments upon me in both transit and upon arrival at the receiving facility  Additionally, I authorize the release of any and all medical records to the receiving facility and request they be transported with me, if possible  I authorize the performance of emergency medical procedures and treatments upon me in both transit and upon arrival at the receiving facility  Additionally, I authorize the release of any and all medical records to the receiving facility and request they be transported with me, if possible  I understand that the safest mode of transportation during a medical emergency is an ambulance and that the Hospital advocates the use of this mode of transport  Risks of traveling to the receiving facility by car, including absence of medical control, life sustaining equipment, such as oxygen, and medical personnel has been explained to me and I fully understand them  (LANETTE CORRECT BOX BELOW)  [  ]  I consent to the stated transfer and to be transported by ambulance/helicopter  [  ]  I consent to the stated transfer, but refuse transportation by ambulance and accept full responsibility for my transportation by car  I understand the risks of non-ambulance transfers and I exonerate the Hospital and its staff from any deterioration in my condition that results from this refusal     X___________________________________________    DATE  23  TIME________  Signature of patient or legally responsible individual signing on patient behalf           RELATIONSHIP TO PATIENT_________________________          Provider Certification    NAME Alena De Leon                                        [facility] 1945                              MRN [id number]    A medical screening exam was performed on the above named patient  Based on the examination:    Condition Necessitating Transfer The encounter diagnosis was UTI (urinary tract infection) due to Enterococcus      Patient Condition: The patient has been stabilized such that within reasonable medical probability, no material deterioration of the patient condition or the condition of the unborn child(annia) is likely to result from the transfer, An emergency transfer is being made prior to stabilization due to the need for definitive care and the benefit of transfer outweighs the risk    Reason for Transfer: Level of Care needed not available at this facility    Transfer Requirements: [id number] [facility]   · Space available and qualified personnel available for treatment as acknowledged by    · Agreed to accept transfer and to provide appropriate medical treatment as acknowledged by       Shannon Strauss  · Appropriate medical records of the examination and treatment of the patient are provided at the time of transfer   [address] [facility], [address] _______  · Transfer will be performed by qualified personnel from    and appropriate transfer equipment as required, including the use of necessary and appropriate life support measures      Provider Certification: I have examined the patient and explained the following risks and benefits of being transferred/refusing transfer to the patient/family:  General risk, such as traffic hazards, adverse weather conditions, rough terrain or turbulence, possible failure of equipment (including vehicle or aircraft), or consequences of actions of persons outside the control of the transport personnel, Unanticipated needs of medical equipment and personnel during transport, Risk of worsening condition, The possibility of a transport vehicle being unavailable      Based on these reasonable risks and benefits to the patient and/or the unborn child(annia), and based upon the information available at the time of the patient’s examination, I certify that the medical benefits reasonably to be expected from the provision of appropriate medical treatments at another medical facility outweigh the increasing risks, if any, to the individual’s medical condition, and in the case of labor to the unborn child, from effecting the transfer      X____________________________________________ DATE 01/07/23        TIME_______      ORIGINAL - SEND TO MEDICAL RECORDS   COPY - SEND WITH PATIENT DURING TRANSFER

## 2023-01-08 NOTE — ED NOTES
Hospital bed brought to room so that pt could be transferred to an adjustable bed  Pt's brief was changed by this nurse and ER tech Hansel Scales  Pt brought warm blankets  Pt remains on cardiac, SpO2, and BP monitoring        Dawson Melendez RN  01/07/23 3103

## 2023-01-08 NOTE — CONSULTS
CONSULT    Patient Name: Meghan Herrera  Patient MRN: [id number]  Date of Service: 1/8/2023   Date / Time Note Created: 1/8/2023 9:02 AM   Referring Provider: Kimmy Logan MD      Provider Creating Note: EVANGELINA Mary    PCP: Zachariah Ferro  Attending Provider:  Lalo Carlisle MD    Reason for Consult: Hematuria    HPI --Meghan Herrera is a 61-year-old male with high-grade treatment refractory bladder cancer, status post chemoradiation 2021, on immunotherapy, malignant bilateral ureteral obstruction, bilateral percutaneous nephrostomy tube dependence, seen recently at [address] [place] [address] in consultation for gross hematuria, status post cystoscopy, clot evacuation, right retrograde pyelography and right ureteral stent replacement/exchange by Dr Patricia Redding 12/14  He was transferred from [address] [place] [address] to [facility] for acute rehab  He was recently treated for flu  Transferred to the emergency room at [facility] for weakness, anemia, gross hematuria and UTI  Urine cultures are pending  The preliminary blood culture demonstrates Enterobacter growth  Patient is receiving cefepime  CT of the abdomen and pelvis demonstrates stable nephrostomy tube placement  There is mild distal right Hydro ureter which is chronic and unchanged from prior imaging and a nonobstructing left renal lower pole calculus  There is also confirmation of interval placement of right double-J nephroureteral stent  Patient has persistent distal right ureteral hypodensity  Bladder wall is thickened with evidence of some blood products  He is on aspirin but no anticoagulation given history  He is seen in his room chronically ill-appearing but in no distress, afebrile and hemodynamically stable  Hemoglobin was 7 1  Patient received transfusion of packed cells  7 7 today  Creatinine 3 18-- at baseline due to CKD  Urologic consultation requested for further management recommendations    Source:the patient and patient           Active Problems:    Patient Active Problem List   Diagnosis   • Coronary artery disease of native artery of native heart with stable angina pectoris ([facility] )   • Bladder carcinoma ([facility] )   • Hypertension with renal disease   • Hyperlipidemia   • Presence of stent in coronary artery   • Type 2 diabetes mellitus, with long-term current use of insulin (HCC)   • Primary osteoarthritis of left knee   • Cystitis due to intravesical BCG administration   • Degeneration of lumbar intervertebral disc   • Back pain   • Arteriosclerosis of artery of extremity (HCC)   • Stable angina pectoris (HCC)   • Herpes zoster without complication   • Localized edema   • Superficial phlebitis   • Preop examination   • Secondary renal hyperparathyroidism (HCC)   • 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)   • Abnormal MRI, lumbar spine   • Diabetic polyneuropathy associated with type 2 diabetes mellitus (HCC)   • Dysuria   • Diabetic ulcer of left foot associated with type 2 diabetes mellitus, 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)   • Acute kidney injury superimposed on CKD (HCC)   • Symptomatic anemia   • Anemia of chronic disease   • Preoperative clearance   • PAD (peripheral artery disease) (HCC)   • Left carotid bruit   • Gross hematuria   • Chronic bronchitis (HCC)   • Moderate major depression, single episode (HCC)   • Thrombocytopenia (HCC)   • 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   • Lumbar spondylosis   • Chronic pain syndrome   • Acute urinary retention   • Hematuria   • Continuous opioid dependence ([facility] )   • Port-A-Cath in place   • 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HCC)   • Embolism and 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 (HCC)   • Abnormal CT scan, bladder   • Retained ureteral stent   • Fall   • Hyperkalemia   • Stage 4 chronic kidney disease ([facility] )   • Bilateral hydronephrosis   • Cancer related pain   • Syncope and collapse   • Depression with suicidal ideation   • Pulmonary nodules   • Retroperitoneal lymphadenopathy   • SOB (shortness of breath)   • History of tobacco use disorder   • Chronic pleural effusion   • UTI (urinary tract infection)   • Right wrist drop   • Insomnia   • Right sided weakness   • Stroke-like symptoms   • Symptomatic hypotension   • Elevated troponin   • Influenza A   • Ambulatory dysfunction   • Sinus tachycardia   • Elevated brain natriuretic peptide (BNP) level   • Nephrostomy status ([facility])   • Acute on chronic blood loss anemia   • Bacteremia due to Enterobacter species             Impressions  · History of bladder cancer--treatment refractory, on immunotherapy  · Bilateral ureteral malignant obstruction with resultant chronic hydronephrosis--patient has right ureteral stent and is PCN dependent bilaterally gross hematuria--refractory secondary to patient's baseline malignancy, presence of ureteral drains and UTI  · Enterobacter bacteremia--secondary to patient's chronic illness/medical frailty, immunotherapy, chronic indwelling long-term drains  Recommendations  1  Continue medical optimization, symptom management and empiric antibiosis  2  Narrow per sensitivities  3  Three-way Shirley inserted  Maintain CBI  4  Manual irrigation of Shirley catheter every 4 hours for at least 24-48 hours  5  Trend H&H  Views per internal medicine as needed  6  Hold anticoagulation  7  All drains and tubes are well-positioned and essentially new  No indication for  tract manipulation at this time  8  We will continue to follow along and manage urinary drains        Past Medical History:   Diagnosis Date   • Anemia     Last assessed: 9/28/17   • Anxiety    • Arteriosclerotic cardiovascular disease     Last assessed: 9/28/17   • Arthritis    • Bladder cancer ([facility] )     bladder- had BCG treatments   • Chronic kidney disease     Stage IV   • CKD (chronic kidney disease) stage 4, GFR 15-29 ml/min ([facility])    • Colon polyp    • Coronary artery disease     7 stents   • Depression    • Diabetes mellitus (HCC)     IDDM   • GERD (gastroesophageal reflux disease)    • Glaucoma    • Hematuria    • History of fusion of cervical spine    • Hyperlipidemia    • Hypertension    • Insomnia     Last assessed: 11/14/12   • Loss of hearing     has hearing aids but usually does not wear them   • Lung cancer [facility])    • Metastatic cancer ([facility] )    • Other seasonal allergic rhinitis     Last assessed: 2/10/16   • PAD (peripheral artery disease) ([facility])    • Shortness of breath     on exertion   • Spinal stenosis of lumbar region    • Transient cerebral ischemia     No Residual   • Uses walker     w/c for longer distances       Past Surgical History:   Procedure Laterality Date   • CARDIAC SURGERY      Cath stent placement  Last assessed: 3/9/17  Interventional Catheterization   • CHOLECYSTECTOMY     • COLONOSCOPY     • CYSTOSCOPY      Diagnostic w/biopsy  Mirela Guadarrama  Last assessed: 12/1/14   • CYSTOSCOPY N/A 04/12/2022    Procedure: CYSTOSCOPY  Bladder biopsies  ;  Surgeon: Nesha Park MD;  [facility];  Service: Urology   • CYSTOSCOPY W/ RETROGRADES Right 03/01/2022    Procedure: CYSTO; stent removal retrograde;  Surgeon: Nesha Park MD;  [facility];  Service: Urology   • CYSTOSCOPY W/ URETERAL STENT PLACEMENT Bilateral 10/18/2021    Procedure: bilateral retrogrades, cytology collection;  Surgeon: Nesha Park MD;  [facility];  Service: Urology   • CYSTOURETHROSCOPY      w/cautery  Mirela Guadarrama   • FL RETROGRADE PYELOGRAM  10/18/2021   • FL RETROGRADE PYELOGRAM  10/24/2021   • FL RETROGRADE PYELOGRAM  12/14/2022   • GASTRIC BYPASS      For morbid obesity w/Shaji-en-Y   Resolved: 11/17/09   • INCISION AND DRAINAGE OF WOUND Right 02/26/2017    Procedure: INCISION AND DRAINAGE (I&D) EXTREMITY WITH APPLICATION OF GRAFT JACKET;  Surgeon: Farooq Sears DPM;  [facility];  Service:    • INCISION AND DRAINAGE OF WOUND Right 04/25/2017    Procedure: INCISION AND DRAINAGE (I&D) EXTREMITY, APPLICATION OF GRAFT;  Surgeon: Ana Galvez DPM;  [facility];  Service:    • IR BIOPSY OTHER  07/02/2020   • IR LOWER EXTREMITY ANGIOGRAM  02/08/2021   • IR LOWER EXTREMITY ANGIOGRAM  02/11/2021   • IR NEPHROSTOMY TUBE CHECK/CHANGE/REPOSITION/REINSERTION/UPSIZE  04/28/2022   • IR NEPHROSTOMY TUBE CHECK/CHANGE/REPOSITION/REINSERTION/UPSIZE  05/24/2022   • IR NEPHROSTOMY TUBE CHECK/CHANGE/REPOSITION/REINSERTION/UPSIZE  06/07/2022   • IR NEPHROSTOMY TUBE CHECK/CHANGE/REPOSITION/REINSERTION/UPSIZE  07/28/2022   • IR NEPHROSTOMY TUBE CHECK/CHANGE/REPOSITION/REINSERTION/UPSIZE  11/15/2022   • IR NEPHROSTOMY TUBE PLACEMENT  02/25/2022   • IR PORT PLACEMENT  01/17/2022   • IR THORACENTESIS  09/02/2022   • IR THORACENTESIS  09/14/2022   • IR THORACENTESIS WITH TUBE PLACEMENT Left     october   • IR TUNNELED CENTRAL LINE PLACEMENT  12/24/2020   • JOINT REPLACEMENT      christofer knees replaced   • OK AMPUTATION METATARSAL W/TOE SINGLE Left 12/21/2020    Procedure: RAY RESECTION FOOT;  Surgeon: Sophia Du DPM;  [facility];  Service: Podiatry   • OK AMPUTATION METATARSAL W/TOE SINGLE Left 12/31/2020    Procedure: 5TH MET RESECTION;  Surgeon: Sophia Du DPM;  [facility];  Service: Podiatry   • OK CYSTO BLADDER W/URETERAL CATHETERIZATION Right 12/14/2022    Procedure: CYSTOSCOPY WITH RETROGRADE PYELOGRAM and CLOT EVACUATION, RIGHT STENT INSERTION, FULGRATION OF BLEEDING POINTS;  Surgeon: Ki Goode MD;  [facility];  Service: Urology   • OK CYSTO W/IRRIG & EVAC MULTPLE OBSTRUCTING CLOTS N/A 02/10/2021    Procedure: CYSTOSCOPY EVACUATION OF CLOTS, fulguration;  Surgeon: Mary Mock MD;  [facility];  Service: Urology   • OK CYSTO W/IRRIG & EVAC MULTPLE OBSTRUCTING CLOTS N/A 10/24/2021    Procedure: CYSTOSCOPY EVACUATION OF CLOT, fulguration of bleeding vessels, right ureter stent placement, retrograde pyelogram;  Surgeon: Marlys Khan MD;  [facility];  Service: Urology   • RI CYSTO W/REMOVAL OF LESIONS SMALL N/A 11/19/2020    Procedure: CYSTO W/BIOPSIES, transurethral prostate bx;  Surgeon: Idalia Tavares MD;  [facility];  Service: Urology   • RI CYSTOURETHROSCOPY W/DEST &/RMVL TUMOR LARGE Bilateral 10/18/2021    Procedure: TRANSURETHRAL RESECTION OF BLADDER TUMOR (TURBT);   Surgeon: Shannon Roy MD;  [facility];  Service: Urology   • RI CYSTOURETHROSCOPY WITH BIOPSY N/A 08/16/2016    Procedure: Childress Asa;  Surgeon: Mala James MD;  [facility];  Service: Urology   • RI Yina Davison 3RD+ ORD [address] PEL/LXTR [facility] Left 02/08/2021    Procedure: LEG angiogram, CO2 w/limited contrast with balloon angioplasty postertior tibial artery;  Surgeon: Mackenzie Gee MD;  [facility];  Service: Vascular   • ROTATOR CUFF REPAIR     • SMALL INTESTINE SURGERY      Surgery Shaji-en-Y   • SPINAL FUSION      lumbar and cervical fusions   • VAC DRESSING APPLICATION Right 03/19/5150    Procedure: APPLICATION VAC DRESSING;  Surgeon: Dominique Robins DPM;  [facility];  Service:    • WOUND DEBRIDEMENT Left 02/16/2021    Procedure: FOOT DEBRIDE, [address] OUT w/graft application;  Surgeon: Dominique Robins DPM;  [facility];  Service: Podiatry       Family History   Problem Relation Age of Onset   • Diabetes Mother    • Heart disease Mother    • Other Mother         High blood pressure   • Heart disease Father    • Diabetes Sister    • Other Sister         High blood pressure   • Kidney disease Sister    • Heart disease Brother    • Other Brother         High blood pressure       Social History     Socioeconomic History   • Marital status: /Civil Union     Spouse name: Not on file   • Number of children: Not on file   • Years of education: Not on file   • Highest education level: Not on file   Occupational History   • Not on file Tobacco Use   • Smoking status: Former     Packs/day: 3 00     Years: 27 00     Pack years: 81 00     Types: Cigarettes     Quit date: 1970     Years since quittin 0   • Smokeless tobacco: Never   Vaping Use   • Vaping Use: Never used   Substance and Sexual Activity   • Alcohol use: Never     Comment: beer / liquor   • Drug use: Not Currently     Types: Marijuana     Comment: quit 2019 had medical marijuana   • Sexual activity: Not Currently   Other Topics Concern   • Not on file   Social History Narrative    Consumes 1 cup of coffee and 1 soda per day     Social Determinants of Health     Financial Resource Strain: Not on file   Food Insecurity: No Food Insecurity   • Worried About Running Out of Food in the Last Year: Never true   • Ran Out of Food in the Last Year: Never true   Transportation Needs: No Transportation Needs   • Lack of Transportation (Medical): No   • Lack of Transportation (Non-Medical):  No   Physical Activity: Not on file   Stress: Not on file   Social Connections: Not on file   Intimate Partner Violence: Not on file   Housing Stability: Low Risk    • Unable to Pay for Housing in the Last Year: No   • Number of Places Lived in the Last Year: 1   • Unstable Housing in the Last Year: No       Allergies   Allergen Reactions   • Atorvastatin Hives, Itching and Rash   • Simvastatin Rash and Edema     Edema of lower legs   • Statins Hives and Itching   • Insulin Lispro Swelling and Edema     " Lower Legs"   • Other Itching, Rash and Other (See Comments)     "EKG Patches"   "blue EKG patches"       Review of Systems  Review of Systems - History obtained from chart review and the patient  General ROS: negative for - chills or fever  Respiratory ROS: no cough, shortness of breath, or wheezing  Cardiovascular ROS: no chest pain or dyspnea on exertion  Gastrointestinal ROS: no abdominal pain, change in bowel habits, or black or bloody stools  Genito-Urinary ROS: positive for - hematuria  negative for - change in urinary stream, dysuria or scrotal mass/pain  Neurological ROS: positive for - weakness       Chart Review   Allergies, current medications, history, problem list    Vital Signs  /67   Pulse 83   Temp 98 8 °F (37 1 °C)   Resp 16   Wt 90 1 kg (198 lb 10 2 oz)   SpO2 97%   BMI 24 18 kg/m²     Physical Exam  General appearance: alert and oriented, in no acute distress, appears stated age, cooperative and no distress  Head: Normocephalic, without obvious abnormality, atraumatic  Neck: no adenopathy, no carotid bruit, no JVD, supple, symmetrical, trachea midline and thyroid not enlarged, symmetric, no tenderness/mass/nodules  Lungs: diminished breath sounds  Heart: regular rate and rhythm, S1, S2 normal, no murmur, click, rub or gallop  Abdomen: soft, non-tender; bowel sounds normal; no masses,  no organomegaly  Extremities: extremities normal, warm and well-perfused; no cyanosis, clubbing, or edema  Pulses: 2+ and symmetric  Neurologic: Grossly normal  Bilateral PCNs right--sanguinous  Left yellow  Condom catheter--wine colored urine output, scant       Laboratory Studies  Lab Results   Component Value Date    HGBA1C 7 2 (H) 10/20/2022    HGBA1C 7 7 (H) 01/11/2021     09/03/2015    K 4 8 01/08/2023    K 4 5 09/03/2015     01/08/2023     09/03/2015    CO2 25 01/08/2023    CO2 28 6 09/03/2015    GLUCOSE 203 (H) 09/03/2015    CREATININE 3 18 (H) 01/08/2023    CREATININE 1 37 (H) 09/03/2015    BUN 53 (H) 01/08/2023    BUN 19 09/03/2015    MG 2 0 01/02/2023    MG 2 1 07/25/2015    PHOS 5 1 (H) 01/02/2023     Lab Results   Component Value Date    WBC 7 39 01/08/2023    WBC 7 07 09/03/2015    RBC 2 53 (L) 01/08/2023    RBC 4 44 09/03/2015    HGB 7 7 (L) 01/08/2023    HGB 14 0 09/03/2015    HCT 23 6 (L) 01/08/2023    HCT 39 2 09/03/2015    MCV 93 01/08/2023    MCV 88 09/03/2015    MCH 30 4 01/08/2023    MCH 31 5 09/03/2015    RDW 15 5 (H) 01/08/2023    RDW 13 5 09/03/2015    PLT 157 01/08/2023     09/03/2015       Imaging and Other Studies  )CT abdomen pelvis wo contrast    Result Date: 1/7/2023  Narrative: CT ABDOMEN AND PELVIS WITHOUT IV CONTRAST INDICATION:   Abdominal pain, acute, nonlocalized ABd pain, nephrostomy in plac  COMPARISON:  CT abdomen/pelvis 12/12/2022  TECHNIQUE:  CT examination of the abdomen and pelvis was performed without intravenous contrast  Axial, sagittal, and coronal 2D reformatted images were created from the source data and submitted for interpretation  Radiation dose length product (DLP) for this visit:  695 mGy-cm   This examination, like all CT scans performed in the [facility], was performed utilizing techniques to minimize radiation dose exposure, including the use of iterative reconstruction and automated exposure control  Enteric contrast was not administered  FINDINGS: ABDOMEN LOWER CHEST:  Stable small left pleural effusion with drainage catheter in place  Stable adjacent atelectasis  Stable epicardial soft tissue  LIVER/BILIARY TREE:  Unremarkable  GALLBLADDER:  Gallbladder is surgically absent  SPLEEN:  Unremarkable  PANCREAS:  Unremarkable  ADRENAL GLANDS:  Unremarkable  KIDNEYS/URETERS:  Stable left percutaneous nephrostomy tube  No left hydronephrosis  Mild distal right hydroureter, similar to the prior study  Stable left renal cysts  Punctate nonobstructing left renal lower pole calculus  Stable right percutaneous nephrostomy tube  Interval placement of a right double-J nephroureteral stent  Stable mild upper pole caliectasis  Interval resolution of previously seen lower pole caliectasis and hydroureter  Likely persistent hyperdensity  in the distal right ureter  Stable right renal cysts  STOMACH AND BOWEL:  There is colonic diverticulosis without evidence of acute diverticulitis  Postsurgical changes of Shaji-en-Y gastric bypass  No bowel obstruction  APPENDIX:  No findings to suggest appendicitis  ABDOMINOPELVIC CAVITY:  No ascites  No pneumoperitoneum  Stable retroperitoneal lymphadenopathy measuring up to 1 6 cm in short axis (series 2 image 44)  VESSELS:  Atherosclerotic changes are present  No evidence of aneurysm  PELVIS REPRODUCTIVE ORGANS:  Unremarkable for patient's age  URINARY BLADDER:  Stable bladder wall thickening, compatible with known malignancy  Increased hyperdensity within the bladder lumen, likely a blood clot  ABDOMINAL WALL/INGUINAL REGIONS:  Mild body wall edema, stable  OSSEOUS STRUCTURES:  No acute fracture or destructive osseous lesion  Spinal degenerative changes are noted  Moderate bilateral hip joint osteoarthritis  Impression: 1  Stable bladder wall thickening, compatible with known malignancy  Increased hyperdensity within the bladder lumen, compatible with new blood clot  2   Stable bilateral percutaneous nephrostomy tubes with interval placement of a right double-J nephroureteral stent  Stable mild right upper pole caliectasis, with resolution of previously seen lower pole caliectasis and hydroureter  Persistent blood products in the distal right ureter  No left hydronephrosis  3   Stable small left pleural effusion with drainage catheter in place  Stable epicardial metastasis  4   Stable metastatic retroperitoneal lymphadenopathy  Workstation performed: [id number]     CT abdomen pelvis wo contrast    Result Date: 12/12/2022  Narrative: CT ABDOMEN AND PELVIS WITHOUT IV CONTRAST INDICATION:   evaluate bilateral neprhrostomy tube placement; pt feels it is dislodged and having hematuria  COMPARISON:  CT scan from 11/17/2022  TECHNIQUE:  CT examination of the abdomen and pelvis was performed without intravenous contrast  Axial, sagittal, and coronal 2D reformatted images were created from the source data and submitted for interpretation  Radiation dose length product (DLP) for this visit:  1097 44 mGy-cm     This examination, like all CT scans performed in the [place]  [address], was performed utilizing techniques to minimize radiation dose exposure, including the use of iterative reconstruction and automated exposure control  Enteric contrast was not administered  FINDINGS: ABDOMEN LOWER CHEST:  Stable left pleural effusion with drainage catheter in place  Stable adjacent atelectasis  Stable epicardial nodules likely lymph nodes the largest unchanged measuring 3 6 cm  LIVER/BILIARY TREE:  Unremarkable  GALLBLADDER:  Removed  SPLEEN:  Unremarkable  PANCREAS:  Unremarkable  ADRENAL GLANDS:  Unremarkable  KIDNEYS/URETERS:  Interval development of right-sided hydroureteronephrosis despite the low percutaneous nephrostomy tube  Hypodensity in the distal right ureter likely blood  Stable appearance of the left kidney with left-sided percutaneous nephrostomy tube  Stable left renal cyst  STOMACH AND BOWEL:  Status post gastric bypass surgery  No bowel obstruction  APPENDIX:  No findings to suggest appendicitis  ABDOMINOPELVIC CAVITY:  No ascites  No pneumoperitoneum  Stable retroperitoneal adenopathy the largest measuring 2 2 cm on image 2/42  VESSELS:  Unremarkable for patient's age  PELVIS REPRODUCTIVE ORGANS:  Unremarkable for patient's age  URINARY BLADDER:  Stable appearance of the bladder with asymmetric thickening of the left bladder wall in keeping with history of bladder cancer  ABDOMINAL WALL/INGUINAL REGIONS:  Unremarkable  OSSEOUS STRUCTURES:  No acute fracture or destructive osseous lesion  Impression: Stable bladder wall thickening towards the left compatible with known neoplasm  New right hydronephrosis with high density intraluminal material in the distal right ureter likely representing blood  Stable metastatic retroperitoneal lymphadenopathy  Stable left pleural effusion with epicardial metastasis  Workstation performed: [id number]     XR chest portable    Result Date: 12/20/2022  Narrative: CHEST INDICATION:   sob   COMPARISON:  11/17/2022 EXAM PERFORMED/VIEWS:  XR CHEST PORTABLE FINDINGS:  Low lung volumes  Clear right lung  Small, similar left pleural effusion with presumed compressive atelectasis  No pneumothorax  Heart, mediastinal and hilar structures are within normal limits  Right chest wall port in the distal SVC  No acute osseous or soft tissue pathology  Stable position of loop recorder  Right rotator cuff repair  Impression: Small left pleural effusion, similar to 11/17/2022  Workstation performed: [id number]     XR chest pa & lateral    Result Date: 1/6/2023  Narrative: CHEST INDICATION:   Cough  COMPARISON:  Chest radiograph 12/30/2022  EXAM PERFORMED/VIEWS:  XR CHEST PA & LATERAL  FINDINGS:  Unchanged position of right vascular access port, left basilar chest tube, and Loop recorder in the left lower hemithorax  Stable mildly elevated left hemidiaphragm  Stable small left pleural effusion and left basilar atelectasis/scarring  No pneumothorax  Cardiomediastinal silhouette appears unremarkable  Degenerative changes of the bilateral glenohumeral joints, right acromioclavicular joint, and the visualized thoracic spine  Partially visualized anterior spinal fusion of the cervical spine  Impression: No significant change since prior study 12/30/2022  Workstation performed: [id number]     XR chest pa & lateral    Result Date: 12/30/2022  Narrative: CHEST INDICATION:   Pleural effusion  COMPARISON:  December 19, 2022 EXAM PERFORMED/VIEWS:  XR CHEST PA & LATERAL FINDINGS:  Right chest port  Loop recorder in the subcutaneous tissues of the left anterior chest wall  Cardiomediastinal silhouette appears unremarkable  Pleural catheter in the base of the left hemithorax reidentified  Small residual left pleural effusion, slightly decreased when compared to previous examination  Left basilar atelectasis/scarring  No pneumothorax  Left glenohumeral and spinal degenerative changes  No acute osseous abnormality       Impression: Decreasing lateral left costophrenic angle effusion with overlying basilar atelectasis  Workstation performed: [id number]     XR humerus left    Result Date: 12/16/2022  Narrative: LEFT HUMERUS INDICATION:   left humeral pain, hx of fall on thanksgiving  COMPARISON:  Left elbow /shoulder radiographs 3/1/2022  VIEWS:  XR HUMERUS LEFT Images: 4 FINDINGS: There is no acute fracture or dislocation  Suture anchors in the humeral head  Mild glenohumeral and AC joint osteoarthritis  Olecranon spur  Calcification adjacent to the epicondyles  No lytic or blastic osseous lesion  There are atherosclerotic calcifications  Soft tissues are otherwise unremarkable  Impression: No acute osseous abnormality  Workstation performed: [id number]     XR humerus right    Result Date: 12/16/2022  Narrative: RIGHT HUMERUS INDICATION:   right wrist drop, hx of fall thanksgiving, concern for humerus fx  COMPARISON:  Right ankle radiographs 7/25/2015  VIEWS:  XR HUMERUS RIGHT Images: 2 FINDINGS: There is no acute fracture or dislocation  Moderate degenerative changes of the glenohumeral joint with high riding humerus  Multiple suture anchors in the humeral head  Moderate degenerative changes at the [facility] joint  Olecranon spur  Again noted is a small ossified body posterior to the distal humerus not significantly changed from 2015  No lytic or blastic osseous lesion  Soft tissues are unremarkable  Impression: No acute osseous abnormality  Workstation performed: [id number]     XR ankle 3+ vw left    Result Date: 1/3/2023  Narrative: LEFT ANKLE INDICATION:   L ankle swelling and pain after fall  COMPARISON:  July 30, 2021 VIEWS:  XR ANKLE 3+ VW LEFT FINDINGS: There is no acute fracture or dislocation  Mild left ankle osteoarthritis reidentified  No lytic or blastic osseous lesion  There are atherosclerotic calcifications  Soft tissues are otherwise unremarkable  Impression: No acute osseous abnormality   Workstation performed: [id number]     CT head wo contrast    Result Date: 1/2/2023  Narrative: CT BRAIN - WITHOUT CONTRAST INDICATION:   Head trauma, minor (Age >= 65y) fall  COMPARISON: CT from December 27, 2022 TECHNIQUE:  CT examination of the brain was performed  In addition to axial images, sagittal and coronal 2D reformatted images were created and submitted for interpretation  Radiation dose length product (DLP) for this visit:  975 mGy-cm   This examination, like all CT scans performed in the [facility], was performed utilizing techniques to minimize radiation dose exposure, including the use of iterative reconstruction and automated exposure control  IMAGE QUALITY:  Diagnostic  FINDINGS: PARENCHYMA:  No intracranial mass, mass effect or midline shift  No CT signs of acute infarction  No acute parenchymal hemorrhage  Mild periventricular white matter hypodensity seen related to chronic small vessel ischemic changes VENTRICLES AND EXTRA-AXIAL SPACES:  Normal for the patient's age  VISUALIZED ORBITS AND PARANASAL SINUSES:  Normal visualized orbits  Normal visualized paranasal sinuses  CALVARIUM AND EXTRACRANIAL SOFT TISSUES:  Normal      Impression: No acute intracranial hemorrhage seen No mass effect or midline shift seen Workstation performed: [id number]     CT head without contrast    Result Date: 12/28/2022  Narrative: CT BRAIN - WITHOUT CONTRAST INDICATION:   R53 1: Weakness  COMPARISON:  CT brain dated May 4, 2022  TECHNIQUE:  CT examination of the brain was performed  In addition to axial images, sagittal and coronal 2D reformatted images were created and submitted for interpretation  Radiation dose length product (DLP) for this visit:  [phone] mGy-cm   This examination, like all CT scans performed in the [facility], was performed utilizing techniques to minimize radiation dose exposure, including the use of iterative reconstruction and automated exposure control    IMAGE QUALITY: Diagnostic  FINDINGS: PARENCHYMA:  No intracranial mass, mass effect or midline shift  No CT signs of acute infarction  No acute parenchymal hemorrhage  There is mild periventricular white matter low attenuation which is nonspecific and most likely related to chronic small vessel ischemic changes  VENTRICLES AND EXTRA-AXIAL SPACES:  Ventricles and extra-axial CSF spaces are prominent commensurate with the degree of volume loss  No hydrocephalus  No acute extra-axial hemorrhage  VISUALIZED ORBITS AND PARANASAL SINUSES:  Normal visualized orbits  Normal visualized paranasal sinuses  CALVARIUM AND EXTRACRANIAL SOFT TISSUES:  Normal      Impression: No acute intracranial abnormality  Mild chronic small vessel ischemic changes and volume loss  Workstation performed: [id number]     CT spine cervical without contrast    Result Date: 1/2/2023  Narrative: CT CERVICAL SPINE - WITHOUT CONTRAST INDICATION:   Neck trauma (Age >= 65y) fall  COMPARISON:  December 7, 2018 TECHNIQUE:  CT examination of the cervical spine was performed without intravenous contrast   Contiguous axial images were obtained  Sagittal and coronal reconstructions were performed  Radiation dose length product (DLP) for this visit:  464 mGy-cm   This examination, like all CT scans performed in the [facility], was performed utilizing techniques to minimize radiation dose exposure, including the use of iterative reconstruction and automated exposure control  IMAGE QUALITY:  Diagnostic  FINDINGS: ALIGNMENT:  Normal alignment of the cervical spine  No subluxation  There is a reduction the cervical lordosis Anterior cervical fusion extending from C4 through C6 VERTEBRAL BODIES:  No fracture  DEGENERATIVE CHANGES:  C2-3 level appear unremarkable C3-4 level demonstrates facet joint disease with severe left foraminal narrowing  There is mild right foraminal narrowing   C4-5 demonstrates degenerative disc disease with the ridging with mild right and mild left foraminal narrowing C5-C6 demonstrates degenerative disc disease with uncovertebral spurring with mild right and mild left foraminal narrowing C6/7 demonstrates ridging with degenerative disease, there is moderate central canal narrowing  There is moderate right and moderate left foraminal narrowing C7-T1 level appear unremarkable PREVERTEBRAL AND PARASPINAL SOFT TISSUES: Unremarkable Right thyroid nodule seen measuring about 8 mm, doesn't meet the criteria for further evaluation with the ultrasound for incidentally detected nodule THORACIC INLET:  Normal      Impression: No acute compression collapse moderate central canal narrowing at C6/7 Multilevel facet joint disease with multilevel foraminal narrowing  Workstation performed: [id number]     FL retrograde pyelogram    Result Date: 12/14/2022  Narrative: C-ARM - retrograde pyelogram INDICATION: Hematuria   Procedure guidance  COMPARISON:  CT abdomen pelvis 12/12/2022  TECHNIQUE: FLUOROSCOPY TIME:   60 3 seconds 8 FLUOROSCOPIC IMAGES FINDINGS: Fluoroscopic guidance provided for procedure guidance  Submitted images demonstrate cannulation of the right ureter with subsequent contrast administration and right ureteral stent placement  Bilateral percutaneous nephrostomy tubes are noted  Osseous and soft tissue detail limited by technique  Impression: Fluoroscopic guidance provided for procedure guidance  Please refer to the separate procedure notes for additional details  Workstation performed: [id number]     VAS upper limb venous duplex scan, unilateral/limited    Result Date: 12/21/2022  Narrative:  [facility] REPORT CLINICAL: Indications: Patient presents with upper lower extremity pain  Patient feels numbness due to IV   Operative History: 2021-02-11 Left Angiogram 2021-02-08 Left Angiogram with angioplasty of posterior tibial 2020-12-31 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 Tunneled central line placement 2020-12-21 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 Cardiac cath with stent placement 2006-01-01 Cardiac cath with stent placement Risk Factors The patient has history of HTN, Diabetes (IDDM), CKD, PAD, CAD and previous smoking (quit >10yrs ago)  CONCLUSION:  Impression RIGHT UPPER LIMB: No evidence of acute or chronic deep vein thrombosis  No evidence of superficial thrombophlebitis noted  Doppler evaluation shows a normal response to augmentation maneuvers  LEFT UPPER LIMB LIMITED: Evaluation shows no evidence of thrombus in the internal jugular vein, subclavian vein, and the brachiocephalic vein    SIGNATURE: Electronically Signed by: Linda Banks on 2022-12-21 12:40:13 PM        Medications   Current Facility-Administered Medications   Medication Dose Route Frequency Provider Last Rate   • acetaminophen  650 mg Oral Q6H PRN Edra Men, CRNP     • ALPRAZolam  0 25 mg Oral BID PRN Edra Men, CRNP     • aluminum-magnesium hydroxide-simethicone  30 mL Oral Q6H PRN Edra Men, CRNP     • ARIPiprazole  2 mg Oral Daily Edra Men, CRNP     • azelastine  1 spray Each Nare BID PRN Edra Men, CRNP     • bimatoprost  1 drop Both Eyes HS Edra Men, CRNP     • calcitriol  0 25 mcg Oral Daily Edra Men, CRNP     • cefepime  1,000 mg Intravenous Q12H Edra Men, CRNP 1,000 mg (01/08/23 0032)   • cyanocobalamin  1,000 mcg Oral Daily Edra Men, CRNP     • ezetimibe  10 mg Oral Daily Edra Men, CRNP     • furosemide  20 mg Oral Daily Edra Men, CRNP     • gabapentin  300 mg Oral HS Edra Men, CRNP     • HYDROmorphone  0 2 mg Intravenous 4x Daily PRN Edra Men, CRNP     • insulin glargine  18 Units Subcutaneous HS Edra Men, CRNP     • insulin lispro  1-5 Units Subcutaneous HS Edra Men, CRNP     • insulin lispro  1-6 Units Subcutaneous TID AC Edra Men, CRNP     • metoprolol tartrate  25 mg Oral Q12H 3600 EVANGELINA Moody     • multivitamin stress formula  1 tablet Oral Daily EVANGELINA Cesar     • ondansetron  4 mg Intravenous Q6H PRN EVANGELINA Cesar     • oxyCODONE  10 mg Oral TID PRN EVANGELINA Cesar     • oxyCODONE  5 mg Oral 4x Daily PRN Rubén Young, EVANGELINA     • pantoprazole  20 mg Oral Early Morning EVANGELINA Cesar     • polyethylene glycol  17 g Oral Daily PRN EVANGELINA Cesar     • senna  2 tablet Oral BID PRN EVANGELINA Cesar     • simethicone  80 mg Oral 4x Daily PRN EVANGELINA Cesar     • sodium chloride  75 mL/hr Intravenous Continuous EVANGELINA Cesar 75 mL/hr (01/08/23 0005)             EVANGELINA Mathias

## 2023-01-09 PROBLEM — R00.0 SINUS TACHYCARDIA: Status: RESOLVED | Noted: 2023-01-06 | Resolved: 2023-01-09

## 2023-01-09 LAB
ANION GAP SERPL CALCULATED.3IONS-SCNC: 7 MMOL/L (ref 4–13)
BASOPHILS # BLD AUTO: 0.03 THOUSANDS/ÂΜL (ref 0–0.1)
BASOPHILS NFR BLD AUTO: 0 % (ref 0–1)
BUN SERPL-MCNC: 47 MG/DL (ref 5–25)
CALCIUM SERPL-MCNC: 7.8 MG/DL (ref 8.3–10.1)
CHLORIDE SERPL-SCNC: 106 MMOL/L (ref 96–108)
CO2 SERPL-SCNC: 26 MMOL/L (ref 21–32)
CREAT SERPL-MCNC: 3.11 MG/DL (ref 0.6–1.3)
EOSINOPHIL # BLD AUTO: 0.85 THOUSAND/ÂΜL (ref 0–0.61)
EOSINOPHIL NFR BLD AUTO: 9 % (ref 0–6)
ERYTHROCYTE [DISTWIDTH] IN BLOOD BY AUTOMATED COUNT: 15.5 % (ref 11.6–15.1)
GFR SERPL CREATININE-BSD FRML MDRD: 18 ML/MIN/1.73SQ M
GLUCOSE SERPL-MCNC: 126 MG/DL (ref 65–140)
GLUCOSE SERPL-MCNC: 166 MG/DL (ref 65–140)
GLUCOSE SERPL-MCNC: 207 MG/DL (ref 65–140)
GLUCOSE SERPL-MCNC: 53 MG/DL (ref 65–140)
GLUCOSE SERPL-MCNC: 78 MG/DL (ref 65–140)
HCT VFR BLD AUTO: 22.6 % (ref 36.5–49.3)
HGB BLD-MCNC: 7.3 G/DL (ref 12–17)
IMM GRANULOCYTES # BLD AUTO: 0.05 THOUSAND/UL (ref 0–0.2)
IMM GRANULOCYTES NFR BLD AUTO: 1 % (ref 0–2)
LYMPHOCYTES # BLD AUTO: 2.25 THOUSANDS/ÂΜL (ref 0.6–4.47)
LYMPHOCYTES NFR BLD AUTO: 24 % (ref 14–44)
MCH RBC QN AUTO: 30.5 PG (ref 26.8–34.3)
MCHC RBC AUTO-ENTMCNC: 32.3 G/DL (ref 31.4–37.4)
MCV RBC AUTO: 95 FL (ref 82–98)
MONOCYTES # BLD AUTO: 0.64 THOUSAND/ÂΜL (ref 0.17–1.22)
MONOCYTES NFR BLD AUTO: 7 % (ref 4–12)
NEUTROPHILS # BLD AUTO: 5.65 THOUSANDS/ÂΜL (ref 1.85–7.62)
NEUTS SEG NFR BLD AUTO: 59 % (ref 43–75)
NRBC BLD AUTO-RTO: 0 /100 WBCS
PLATELET # BLD AUTO: 180 THOUSANDS/UL (ref 149–390)
PMV BLD AUTO: 10.3 FL (ref 8.9–12.7)
POTASSIUM SERPL-SCNC: 4.5 MMOL/L (ref 3.5–5.3)
RBC # BLD AUTO: 2.39 MILLION/UL (ref 3.88–5.62)
SODIUM SERPL-SCNC: 139 MMOL/L (ref 135–147)
WBC # BLD AUTO: 9.47 THOUSAND/UL (ref 4.31–10.16)

## 2023-01-09 RX ORDER — INSULIN GLARGINE 100 [IU]/ML
15 INJECTION, SOLUTION SUBCUTANEOUS
Status: DISCONTINUED | OUTPATIENT
Start: 2023-01-09 | End: 2023-01-21

## 2023-01-09 RX ORDER — SODIUM CHLORIDE 9 MG/ML
20 INJECTION, SOLUTION INTRAVENOUS ONCE
Status: CANCELLED | OUTPATIENT
Start: 2023-02-03

## 2023-01-09 RX ORDER — SODIUM CHLORIDE 9 MG/ML
20 INJECTION, SOLUTION INTRAVENOUS ONCE
Status: CANCELLED | OUTPATIENT
Start: 2023-02-10

## 2023-01-09 RX ADMIN — ACETAMINOPHEN 975 MG: 325 TABLET, FILM COATED ORAL at 21:35

## 2023-01-09 RX ADMIN — METOPROLOL TARTRATE 25 MG: 25 TABLET, FILM COATED ORAL at 21:35

## 2023-01-09 RX ADMIN — ACETAMINOPHEN 975 MG: 325 TABLET, FILM COATED ORAL at 13:35

## 2023-01-09 RX ADMIN — METOPROLOL TARTRATE 25 MG: 25 TABLET, FILM COATED ORAL at 08:08

## 2023-01-09 RX ADMIN — HYDROMORPHONE HYDROCHLORIDE 0.2 MG: 0.2 INJECTION, SOLUTION INTRAMUSCULAR; INTRAVENOUS; SUBCUTANEOUS at 19:56

## 2023-01-09 RX ADMIN — CALCITRIOL 0.25 MCG: 0.25 CAPSULE, LIQUID FILLED ORAL at 08:08

## 2023-01-09 RX ADMIN — OXYBUTYNIN CHLORIDE 5 MG: 5 TABLET ORAL at 18:07

## 2023-01-09 RX ADMIN — OXYCODONE HYDROCHLORIDE 10 MG: 10 TABLET ORAL at 18:07

## 2023-01-09 RX ADMIN — GABAPENTIN 300 MG: 300 CAPSULE ORAL at 21:35

## 2023-01-09 RX ADMIN — CEFEPIME HYDROCHLORIDE 1000 MG: 1 INJECTION, POWDER, FOR SOLUTION INTRAMUSCULAR; INTRAVENOUS at 23:43

## 2023-01-09 RX ADMIN — OXYBUTYNIN CHLORIDE 5 MG: 5 TABLET ORAL at 08:08

## 2023-01-09 RX ADMIN — EZETIMIBE 10 MG: 10 TABLET ORAL at 08:08

## 2023-01-09 RX ADMIN — INSULIN LISPRO 1 UNITS: 100 INJECTION, SOLUTION INTRAVENOUS; SUBCUTANEOUS at 18:09

## 2023-01-09 RX ADMIN — B-COMPLEX W/ C & FOLIC ACID TAB 1 TABLET: TAB at 08:08

## 2023-01-09 RX ADMIN — INSULIN LISPRO 2 UNITS: 100 INJECTION, SOLUTION INTRAVENOUS; SUBCUTANEOUS at 11:27

## 2023-01-09 RX ADMIN — BIMATOPROST 1 DROP: 0.1 SOLUTION/ DROPS OPHTHALMIC at 21:35

## 2023-01-09 RX ADMIN — FUROSEMIDE 20 MG: 20 TABLET ORAL at 08:08

## 2023-01-09 RX ADMIN — CYANOCOBALAMIN TAB 500 MCG 1000 MCG: 500 TAB at 08:08

## 2023-01-09 RX ADMIN — ACETAMINOPHEN 975 MG: 325 TABLET, FILM COATED ORAL at 05:12

## 2023-01-09 RX ADMIN — OXYBUTYNIN CHLORIDE 5 MG: 5 TABLET ORAL at 21:35

## 2023-01-09 RX ADMIN — OXYCODONE HYDROCHLORIDE 10 MG: 10 TABLET ORAL at 03:52

## 2023-01-09 RX ADMIN — PANTOPRAZOLE SODIUM 20 MG: 20 TABLET, DELAYED RELEASE ORAL at 05:12

## 2023-01-09 RX ADMIN — ARIPIPRAZOLE 2 MG: 2 TABLET ORAL at 08:08

## 2023-01-09 RX ADMIN — OXYCODONE HYDROCHLORIDE 10 MG: 10 TABLET ORAL at 09:03

## 2023-01-09 RX ADMIN — INSULIN GLARGINE 15 UNITS: 100 INJECTION, SOLUTION SUBCUTANEOUS at 21:35

## 2023-01-09 RX ADMIN — CEFEPIME HYDROCHLORIDE 1000 MG: 1 INJECTION, POWDER, FOR SOLUTION INTRAMUSCULAR; INTRAVENOUS at 11:18

## 2023-01-09 NOTE — ASSESSMENT & PLAN NOTE
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, recurrence with CIS in 2016 BCG again  BCG refractory disease with pelvic metastases now s/p chemoradiation 2021, on current immunotherapy  · Chronic B/L PCN tubes  · CT A/P Jan 7: Stable bladder wall thickening, compatible with known malignancy  Stable metastatic retroperitoneal lymphadenopathy  Stable epicardial metastasis  · Per oncology note on Jan 6: Chemotherapy treatment should remain on hold for now untill he is clinically stable/discharged; particularly since he is positive flu  To follow-up with primary oncologist after discharge for reevaluation prior to resume treatment    · Likely IR for b/l PCN exchange this hospitalization

## 2023-01-09 NOTE — ASSESSMENT & PLAN NOTE
· Patient has persistent hematuria due to bladder cancer  History of recurrent UTI on suppressive Bactrim  · CT A/P: Stable bladder wall thickening, compatible with known malignancy  Increased hyperdensity within the bladder lumen, compatible with new blood clot  · Recent admission at [facility] 12/12-12/22 for hematuria and obstructive uropathy in the setting of known bladder cancer s/p BCG and subsequent chemotherapy/radiation s/p bilateral chronic PCN tubes   Noted to have new right-sided hydronephrosis on CT s/p cystoscopy, clot evacuation w/ right ureteral stenting on 12/14 by Dr Codey Nick  · UA bloody, innumerable RBC/WBC, +leuks, no nitrites  · Continue q4h and irrigation  · Continue wagner and CBI  · Monitor I&O  · Monitor Hg  · Urine culture growing E coli  · Urology consulted appreciate recommendations

## 2023-01-09 NOTE — ASSESSMENT & PLAN NOTE
Lab Results   Component Value Date    EGFR 18 01/09/2023    EGFR 17 01/08/2023    EGFR 17 01/07/2023    CREATININE 3 11 (H) 01/09/2023    CREATININE 3 18 (H) 01/08/2023    CREATININE 3 21 (H) 01/07/2023     · Chronic CKD 4 at baseline    Monitor BMP

## 2023-01-09 NOTE — PLAN OF CARE
Problem: Nutrition/Hydration-ADULT  Goal: Nutrient/Hydration intake appropriate for improving, restoring or maintaining nutritional needs  Description: Monitor and assess patient's nutrition/hydration status for malnutrition  Collaborate with interdisciplinary team and initiate plan and interventions as ordered  Monitor patient's weight and dietary intake as ordered or per policy  Utilize nutrition screening tool and intervene as necessary  Determine patient's food preferences and provide high-protein, high-caloric foods as appropriate       INTERVENTIONS:  - Monitor oral intake, urinary output, labs, and treatment plans  - Assess nutrition and hydration status and recommend course of action  - Evaluate amount of meals eaten  - Assist patient with eating if necessary   - Allow adequate time for meals  - Recommend/ encourage appropriate diets, oral nutritional supplements, and vitamin/mineral supplements  - Order, calculate, and assess calorie counts as needed  - Recommend, monitor, and adjust tube feedings and TPN/PPN based on assessed needs  - Assess need for intravenous fluids  - Provide specific nutrition/hydration education as appropriate  - Include patient/family/caregiver in decisions related to nutrition  Outcome: Progressing     Problem: Prexisting or High Potential for Compromised Skin Integrity  Goal: Skin integrity is maintained or improved  Description: INTERVENTIONS:  - Identify patients at risk for skin breakdown  - Assess and monitor skin integrity  - Assess and monitor nutrition and hydration status  - Monitor labs   - Assess for incontinence   - Turn and reposition patient  - Assist with mobility/ambulation  - Relieve pressure over bony prominences  - Avoid friction and shearing  - Provide appropriate hygiene as needed including keeping skin clean and dry  - Evaluate need for skin moisturizer/barrier cream  - Collaborate with interdisciplinary team   - Patient/family teaching  - Consider wound care consult   Outcome: Progressing     Problem: MOBILITY - ADULT  Goal: Maintain or return to baseline ADL function  Description: INTERVENTIONS:  -  Assess patient's ability to carry out ADLs; assess patient's baseline for ADL function and identify physical deficits which impact ability to perform ADLs (bathing, care of mouth/teeth, toileting, grooming, dressing, etc )  - Assess/evaluate cause of self-care deficits   - Assess range of motion  - Assess patient's mobility; develop plan if impaired  - Assess patient's need for assistive devices and provide as appropriate  - Encourage maximum independence but intervene and supervise when necessary  - Involve family in performance of ADLs  - Assess for home care needs following discharge   - Consider OT consult to assist with ADL evaluation and planning for discharge  - Provide patient education as appropriate  Outcome: Progressing  Goal: Maintains/Returns to pre admission functional level  Description: INTERVENTIONS:  - Perform BMAT or MOVE assessment daily    - Set and communicate daily mobility goal to care team and patient/family/caregiver  - Collaborate with rehabilitation services on mobility goals if consulted  - Perform Range of Motion 3 times a day  - Reposition patient every 2 hours    - Dangle patient 3 times a day  - Stand patient 3 times a day  - Ambulate patient 3 times a day  - Out of bed to chair 3 times a day   - Out of bed for meals 3 times a day  - Out of bed for toileting  - Record patient progress and toleration of activity level   Outcome: Progressing     Problem: Potential for Falls  Goal: Patient will remain free of falls  Description: INTERVENTIONS:  - Educate patient/family on patient safety including physical limitations  - Instruct patient to call for assistance with activity   - Consult OT/PT to assist with strengthening/mobility   - Keep Call bell within reach  - Keep bed low and locked with side rails adjusted as appropriate  - Keep care items and personal belongings within reach  - Initiate and maintain comfort rounds  - Make Fall Risk Sign visible to staff  - Offer Toileting every 2 Hours, in advance of need  - Initiate/Maintain bed alarm  - Obtain necessary fall risk management equipment: bed alarm   - Apply yellow socks and bracelet for high fall risk patients  - Consider moving patient to room near nurses station  Outcome: Progressing     Problem: PAIN - ADULT  Goal: Verbalizes/displays adequate comfort level or baseline comfort level  Description: Interventions:  - Encourage patient to monitor pain and request assistance  - Assess pain using appropriate pain scale  - Administer analgesics based on type and severity of pain and evaluate response  - Implement non-pharmacological measures as appropriate and evaluate response  - Consider cultural and social influences on pain and pain management  - Notify physician/advanced practitioner if interventions unsuccessful or patient reports new pain  Outcome: Progressing     Problem: INFECTION - ADULT  Goal: Absence or prevention of progression during hospitalization  Description: INTERVENTIONS:  - Assess and monitor for signs and symptoms of infection  - Monitor lab/diagnostic results  - Monitor all insertion sites, i e  indwelling lines, tubes, and drains  - Monitor endotracheal if appropriate and nasal secretions for changes in amount and color  - [place]au appropriate cooling/warming therapies per order  - Administer medications as ordered  - Instruct and encourage patient and family to use good hand hygiene technique  - Identify and instruct in appropriate isolation precautions for identified infection/condition  Outcome: Progressing  Goal: Absence of fever/infection during neutropenic period  Description: INTERVENTIONS:  - Monitor WBC    Outcome: Progressing     Problem: SAFETY ADULT  Goal: Maintain or return to baseline ADL function  Description: INTERVENTIONS:  -  Assess patient's ability to carry out ADLs; assess patient's baseline for ADL function and identify physical deficits which impact ability to perform ADLs (bathing, care of mouth/teeth, toileting, grooming, dressing, etc )  - Assess/evaluate cause of self-care deficits   - Assess range of motion  - Assess patient's mobility; develop plan if impaired  - Assess patient's need for assistive devices and provide as appropriate  - Encourage maximum independence but intervene and supervise when necessary  - Involve family in performance of ADLs  - Assess for home care needs following discharge   - Consider OT consult to assist with ADL evaluation and planning for discharge  - Provide patient education as appropriate  Outcome: Progressing  Goal: Maintains/Returns to pre admission functional level  Description: INTERVENTIONS:  - Perform BMAT or MOVE assessment daily    - Set and communicate daily mobility goal to care team and patient/family/caregiver  - Collaborate with rehabilitation services on mobility goals if consulted  - Perform Range of Motion 3 times a day  - Reposition patient every 2 hours    - Dangle patient 3 times a day  - Stand patient 3 times a day  - Ambulate patient 3 times a day  - Out of bed to chair 3 times a day   - Out of bed for meals 3 times a day  - Out of bed for toileting  - Record patient progress and toleration of activity level   Outcome: Progressing  Goal: Patient will remain free of falls  Description: INTERVENTIONS:  - Educate patient/family on patient safety including physical limitations  - Instruct patient to call for assistance with activity   - Consult OT/PT to assist with strengthening/mobility   - Keep Call bell within reach  - Keep bed low and locked with side rails adjusted as appropriate  - Keep care items and personal belongings within reach  - Initiate and maintain comfort rounds  - Make Fall Risk Sign visible to staff  - Offer Toileting every 2 Hours, in advance of need  - Initiate/Maintain bed alarm  - Obtain necessary fall risk management equipment: bed alarm   - Apply yellow socks and bracelet for high fall risk patients  - Consider moving patient to room near nurses station  Outcome: Progressing     Problem: GENITOURINARY - ADULT  Goal: Maintains or returns to baseline urinary function  Description: INTERVENTIONS:  - Assess urinary function  - Encourage oral fluids to ensure adequate hydration if ordered  - Administer IV fluids as ordered to ensure adequate hydration  - Administer ordered medications as needed  - Offer frequent toileting  - Follow urinary retention protocol if ordered  Outcome: Progressing  Goal: Absence of urinary retention  Description: INTERVENTIONS:  - Assess patient’s ability to void and empty bladder  - Monitor I/O  - Bladder scan as needed  - Discuss with physician/AP medications to alleviate retention as needed  - Discuss catheterization for long term situations as appropriate  Outcome: Progressing  Goal: Urinary catheter remains patent  Description: INTERVENTIONS:  - Assess patency of urinary catheter  - If patient has a chronic wagner, consider changing catheter if non-functioning  - Follow guidelines for intermittent irrigation of non-functioning urinary catheter  Outcome: Progressing     Problem: HEMATOLOGIC - ADULT  Goal: Maintains hematologic stability  Description: INTERVENTIONS  - Assess for signs and symptoms of bleeding or hemorrhage  - Monitor labs  - Administer supportive blood products/factors as ordered and appropriate  Outcome: Progressing     Problem: MUSCULOSKELETAL - ADULT  Goal: Maintain or return mobility to safest level of function  Description: INTERVENTIONS:  - Assess patient's ability to carry out ADLs; assess patient's baseline for ADL function and identify physical deficits which impact ability to perform ADLs (bathing, care of mouth/teeth, toileting, grooming, dressing, etc )  - Assess/evaluate cause of self-care deficits   - Assess range of motion  - Assess patient's mobility  - Assess patient's need for assistive devices and provide as appropriate  - Encourage maximum independence but intervene and supervise when necessary  - Involve family in performance of ADLs  - Assess for home care needs following discharge   - Consider OT consult to assist with ADL evaluation and planning for discharge  - Provide patient education as appropriate  Outcome: Progressing  Goal: Maintain proper alignment of affected body part  Description: INTERVENTIONS:  - Support, maintain and protect limb and body alignment  - Provide patient/ family with appropriate education  Outcome: Progressing

## 2023-01-09 NOTE — ASSESSMENT & PLAN NOTE
· Patient tested + for influenza A prior to discharge to [facility] on [address]  · Asymptomatic therefore not treated   · Negative flu on Jan 7

## 2023-01-09 NOTE — PLAN OF CARE
Problem: PHYSICAL THERAPY ADULT  Goal: Performs mobility at highest level of function for planned discharge setting  See evaluation for individualized goals  Description: Treatment/Interventions: Functional transfer training, LE strengthening/ROM, Elevations, Therapeutic exercise, Cognitive reorientation, Patient/family training, Equipment eval/education, Bed mobility, Gait training, Compensatory technique education, Continued evaluation, Spoke to nursing, OT          See flowsheet documentation for full assessment, interventions and recommendations  Note: Prognosis: Good  Problem List: Decreased strength, Impaired balance, Decreased mobility, Impaired judgement, Decreased safety awareness, Pain  Assessment: Shelbi Lai is a 76 y o  male admitted to [facility] on 1/7/2023 for Bacteremia due to Enterobacter species  PT was consulted and pt was seen on 1/9/2023 for mobility assessment and d/c planning  Pt presents w high fall risk, multiple lines, acute pain of groin  At baseline pt is indep for ADLs and ambulation  Reports getting assist for ADLs and mobility at rehab  Note R resting hand splint secondary to acute wrist drop; pt reports wearing brace at rest, not needed for activity  Pt is currently functioning at a mod Ax2 for bed mobility and sit>stand transf due to high pain levels and submaximal effort due to same  Pt demonstrated improved tolerance once standing  No gross LOB and able to progress to min Ax2> min Ax1  Does require frequent cuing during session for technique and safety awareness  Repositioned end of session for comfort  Pt will benefit from continued skilled IP PT to address the above mentioned impairments  in order to maximize recovery and increase functional independence when completing mobility and ADLs  At this time PT recommendations for d/c are STR    Barriers to Discharge: Inaccessible home environment, Decreased caregiver support     PT Discharge Recommendation: Post acute rehabilitation services    See flowsheet documentation for full assessment

## 2023-01-09 NOTE — ASSESSMENT & PLAN NOTE
· Patient had episode of unresponsiveness with hypotension on Jan 2  He was sent from [facility] to ER  · Noted with anemia  Received IV bolus in ED and 2u PRBC during prior admission    Improved and medically cleared for return to [facility]   · Close BP monitoring

## 2023-01-09 NOTE — ASSESSMENT & PLAN NOTE
· Presented with tachycardia likely multifactorial given fever, blood loss anemia and hypotension  · On Jan 2, patient had a brief period of unresponsiveness and noted with hypotension    Blood pressure improved after holding medications: furosemide, Imdur, metoprolol and tamsulosin  · 1/6/23 seen by cardiology recommended gradually increasing metoprolol to 37 5mg twice daily and subsequently to 50 mg twice daily if his blood pressure would allow  · Continue metoprolol 25mg BID for now with hold parameters

## 2023-01-09 NOTE — ASSESSMENT & PLAN NOTE
· Patient was transferred from [address] [place] [address] due to fever and positive blood cultures  · Blood culture growing gram-negative rods   · BC ID: Enterobacter cloacae complex  Recommendation for Cefepime  Avoid 1st, 2nd, and 3rd generation cephalosporins  · Likely urinary source   Ucx Dec grew both E cloacae and E faecalis  · Given one dose of IV Zosyn and vancomycin prior to transfer  · Continue IV Cefepime 1g BID, renally dosed, follow ID recommendations  · Repeat blood cultures no growth at 24 hours  · Follow-up urine culture growing E coli  · ID consulted appreciate recommendations  · Pt/ot recommending acute rehab

## 2023-01-09 NOTE — ASSESSMENT & PLAN NOTE
· Chronic bilateral nephrostomy tubes  · Prior to transfer patient reported abdominal pain, CT was done and shows: Stable bilateral percutaneous nephrostomy tubes with interval placement of a right double-J nephroureteral stent  Stable mild right upper pole caliectasis, with resolution of previously seen lower pole caliectasis and hydroureter    Persistent blood   · Likely exchange this admission  · Appreciate urology recommendations  · Monitor I&O

## 2023-01-09 NOTE — PLAN OF CARE
Problem: OCCUPATIONAL THERAPY ADULT  Goal: Performs self-care activities at highest level of function for planned discharge setting  See evaluation for individualized goals  Description: Treatment Interventions: ADL retraining, Functional transfer training, UE strengthening/ROM, Endurance training, Patient/family training, Equipment evaluation/education, Compensatory technique education, Continued evaluation, Activityengagement          See flowsheet documentation for full assessment, interventions and recommendations  Note: Limitation: Decreased ADL status, Decreased UE strength, Decreased Safe judgement during ADL, Decreased endurance, Decreased self-care trans, Decreased high-level ADLs, Decreased fine motor control  Prognosis: Good  Assessment: Pt is a 76 y o  male seen for OT evaluation s/p adm to [facility] on 1/7/2023 as a transfer from [facility] w/ Bacteremia due to Enterobacter species, Acute on chronic blood loss anemia, Gross hematuria, elevated BNP level, and Symptomatic hypotension  Comorbidities affecting pt’s functional performance include a significant PMH of Anemia, Anxiety, Arthritis, Bladder CA, CKD, CAD, Depression, DM, Glaucoma, Hamturia, HLD, HTN, Lung CA, PAD, Spinal stenosis  Pt with active OT orders and activity orders for Up with assistance  Pt presented from [address] [place] [address] for rehab (admitted on 12/22/22 following admission at [facility])  Prior to rehab, pt lives with spouse and sister-in-law in a multi-level house with 1 vs 3 ADILENE and stair glide to 2nd floor  (-) home alone  At baseline, pt was I w/ ADLs and functional transfers/mobility w/ use of Rollator  Family performs IADLs  (-)   +Falls PTA   Upon evaluation, pt currently requires Min A for UB ADLs, Mod-Max A for LB ADLs, Mod A for toileting, Mod A of 2 for bed mobility, Mod A of 2 for transfers, and Min A of 1-2 for functional mobility 2* the following deficits impacting occupational performance: decreased ROM, decreased strength, decreased balance, decreased tolerance, impaired FMC, impaired problem solving, decreased safety awareness and increased pain  These impairments, as well at pt’s fall risk, multiple lines, steps to enter environment, difficulty performing ADLS and limited insight into deficits limit pt’s ability to safely engage in all baseline areas of occupation  Pt to continue to benefit from continued acute OT services during hospital stay to address defined deficits and to maximize level of functional independence in the following Occupational Performance areas: grooming, bathing/shower, toilet hygiene, dressing, health maintenance, functional mobility, community mobility and clothing management  From OT standpoint, recommend STR upon D/C   OT will continue to follow pt 3-5x/wk to address the following goals to  w/in 10-14 days:     OT Discharge Recommendation: Post acute rehabilitation services

## 2023-01-09 NOTE — PROGRESS NOTES
Progress Note - Infectious Disease   María Torres 76 y o  male MRN: [id number]  Unit/Bed#: E5 -01 Encounter: [id number]    Impression/Plan:  1  Enterobacter cloacae bacteremia  1 of 2 blood culture sets collected 1/6/2023 showed growth of Enterobacter cloacae  Concern for a urinary source of infection in the setting of bladder cancer with bilateral PCNs and right ureteral stent in place, and intermittent hematuria  UA with innumerable RBC, WBC, bacteria  CT A/P with known malignancy, no hydronephrosis, blood products in the bladder and collecting system  Recent urine culture showed growth of Enterobacter as well  The patient does have a right chest wall Port-A-Cath in place and left pleural catheter, but higher concern for urinary source of infection  He is hemodynamically stable and afebrile here  He has been receiving IV cefepime which he appears to be tolerating without difficulty  I will continue the cefepime for now  If he continues to clinically improve he can transition to Bactrim vs Levaquin to finish 7 days of antibiotic treatment  Recommend PCNs be exchanged before patient is discharged   -continue IV cefepime for now  -monitor CBCD and BMP  -monitor vitals  -serial exams and care of wagner  -serial exams and care of B/L PCNs  -recommend B/L PCN exchange before patient leaves the hospital  -continue follow up with urology     2  Metastatic high-grade treatment refractory bladder cancer status post chemoradiation 2021  Patient is currently on immunotherapy with Enfortumab  Complicated by malignant bilateral ureteral obstruction status post bilateral percutaneous nephrostomies and right ureteral stent placement  Patient has left pleural catheter in place  Patient has been seen inpatient by urology and oncology   -continue follow up with urology  -continue follow up with oncology      3  CKD stage IV    Creatinine currently at baseline   -monitor creatinine  -dose adjust antibiotic for renal function as needed  -avoid nephrotoxins     4   Acute on chronic blood loss anemia  Patient has history of recurrent hematuria due to bladder cancer  Requires intermittent blood transfusions  -monitor CBCD  -transfusion support per primary/ongolocy     5  Influenza A  Positive on 1/3/2023, asymptomatic  Repeat testing here was negative   -maintain droplet precaution x7 days     6  Type 2 diabetes mellitus with long-term use of insulin  Patient's last HbA1c was 7 2% on 10/20/2022  Elevated blood glucose is risk factor for infection  Recommend tight glycemic control   -blood glucose management per primary service    Above plan was discussed in detail with patient at the bedside  Above plan was discussed in detail with SLIM  Antibiotics:  Cefepime 3  Antibiotic 3    Subjective:  Patient reports he's doing ok, is tired  Has been having spasms around his Shirley catheter  Reports his urine still appears red/pink  He has some pain in his back around both B/L PCNs  He has no fever, chills, sweats, shakes; no nausea or vomiting; no cough, shortness of breath, or chest pain  No concerns with his Port-a-cath  No new symptoms  Objective:  Vitals:  Temp:  [97 8 °F (36 6 °C)-98 4 °F (36 9 °C)] 98 1 °F (36 7 °C)  HR:  [74-91] 76  Resp:  [14-18] 18  BP: (106-133)/(76-83) 126/83  SpO2:  [96 %-99 %] 99 %  Temp (24hrs), Av 1 °F (36 7 °C), Min:97 8 °F (36 6 °C), Max:98 4 °F (36 9 °C)  Current: Temperature: 98 1 °F (36 7 °C)    Physical Exam:   General Appearance:  Alert, interactive, nontoxic, no acute distress  He appears chronically ill  He appears comfortable sitting up in bed  Throat: Oropharynx moist without lesions  Lungs:   Clear to auscultation bilaterally; no wheezes, rhonchi or rales; respirations unlabored on room air  Heart:  RRR; no murmur, rub or gallop  Chest: No edema over site of R chest Port-a-cath  Abdomen:   Soft, non-tender, non-distended, positive bowel sounds       Back: B/L PCNs intact  Genitourinary: Shirley intact to CBI, urine output pink  Extremities: No clubbing or cyanosis, no edema  Skin: No new rashes or lesions noted on exposed skin  Labs, Imaging, & Other studies:   All pertinent labs and imaging studies were personally reviewed  Results from last 7 days   Lab Units 01/09/23  0514 01/08/23  0539 01/07/23  1907   WBC Thousand/uL 9 47 7 39 9 78   HEMOGLOBIN g/dL 7 3* 7 7* 7 1*   PLATELETS Thousands/uL 180 157 168     Results from last 7 days   Lab Units 01/09/23  0514 01/08/23  0539 01/07/23  1613 01/07/23  1114 01/06/23  0704   POTASSIUM mmol/L 4 5   < > 5 5* 4 7 4 5   CHLORIDE mmol/L 106   < > 101 103 103   CO2 mmol/L 26   < > 25 26 27   BUN mg/dL 47*   < > 57* 52* 51*   CREATININE mg/dL 3 11*   < > 3 21* 3 21* 3 07*   EGFR ml/min/1 73sq m 18   < > 17 17 18   CALCIUM mg/dL 7 8*   < > 8 9 8 7 8 6   AST U/L  --   --  34 27 42   ALT U/L  --   --  24 23 29   ALK PHOS U/L  --   --  96 92 106    < > = values in this interval not displayed  Results from last 7 days   Lab Units 01/07/23  1613 01/07/23  1611 01/06/23  2030 01/06/23  1530   BLOOD CULTURE  No Growth at 24 hrs  No Growth at 24 hrs  Enterobacter cloacae*  --    GRAM STAIN RESULT   --   --  Gram negative rods*  --    URINE CULTURE   --   --   --  Culture results to follow

## 2023-01-09 NOTE — CONSULTS
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- initially diagnosed in / [facility] several intravesical therapies  -S/p chemoradiation 2021; malignant bilateral ureteral obstruction, with BL nephrostomy tubes along with right ureteral stent  Chronic BL PCN tubes  May need IR stent exchange for this admission, last exchange was on 11/15/22   -CT A/P (1/7/23): Stable bladder wall thickening, compatible with known malignancy  Stable metastatic retroperitoneal lymphadenopathy  Stable epicardial metastasis  -Followed by Dr Evelyn Marroquin  Last seen on 11/22/2022  Treatment on hold d/t multiple hospitalizations   -Completed concurrent 5-FU/MTC + radiation therapy, but was found to have POD  PET/CT on 6/28/22 showed lung mets; had 2 cycles of q6 week Keytruda before POD     -On 9/2/22, decision to start on enfortumab vedotin (Padcev) due to progression of disease even on Keytruda; patient required 1 more round of thoracentesis for accumulation of pleural fluid causing SOB on 9/14/22; 2L of fluid was removed  Last infusion was on 12/2/22  2  Acute on chronic anemia  -Hemoglobin trend: 7 3 (1/9) <--7 7 (1/8) <--7 1 (1/7) <--9 4 (1/6)  s/p blood transfusion 1/2/23  -Evident since 2017, with periods of normal values  MCV 95, MCHC 32 3  -Iron panel (1/7/23): iron saturation 12%, ferritin 217  -WBC and platelet counts normal  -Evidence of gross hematuria- both nephrostomy tubes are draining clear magnolia urine today   -Currently on continuous bladder irrigation/CBI, tolerating well   On Ditropan 5mg TID for bladder spasm     -history of gastric bypass surgery, iron deficiency from malabsorption   -Hx of chronic stage 4 CKD since 2015  BUN 47, creatinine 3 11, eGFR 18    PLAN:  1) CBC daily, transfuse as needed for hemoglobin <7 g/dL  Gross hematuria appears to be improving; followed by Urology  2) Agree with rehab upon discharge; patient expressed being motivated to resume rehab activities  Discussed the importance of increased physical conditioning as performance status is important for continuing systemic treatment  Influenza+ as well  He was on room air upon examination  2) Outpatient follow up plan: to be set up by inpatient Cancer Coordinator, ARLEEN Aldrich with Dr June Scales for reevaluation prior to resume treatment  Plan for follow up imaging as well  Thank you for this consult  Aiyana Garcia, [address], CRNP  Hematology-Oncology       History of present illness:  Nicole Bernstein is a 76 y o  male  for spinal stenosis, gastric bypass (2011), 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, diabetic nephropathy, and renal vascular disease, DM transferred from transitional care facility unit at [facility] with a past medical history of anemia, cardiovascular disease, CKD stage IV, DM, HTN, HLD  He was previously admitted at [facility] back in December for a urinary tract infection along with hematuria  He was then discharged to [facility] rehab where he was noted to have symptomatic hypotension and anemia; improved s/p 2 units of PRBCs  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, initially diagnosed in 1994, followed by Dr Justin Ford  He underwent several intravesical therapies  S/p chemoradiation 2021, hx of malignant bilateral ureteral obstruction, with BL nephrostomy tubes along with right ureteral stent  His most current imaging  CT A/P (1/7/23) showed stable bladder wall thickening, compatible with known malignancy  Stable metastatic retroperitoneal lymphadenopathy  Stable epicardial metastasis  Treatment on hold d/t multiple hospitalizations  Historically, he completed concurrent 5-FU/MTC + radiation therapy, but was found to have POD  PET/CT on 6/28/22 showed lung mets; had 2 cycles of q6 week Keytruda before POD   On 9/2/22, decision to start on enfortumab vedotin (Padcev) due to progression of disease even on Keytruda  Last infusion was on 12/2/22  Oncology consulted regarding transition of care  Review of Systems:   Review of Systems   Constitutional: Positive for activity change, appetite change and fatigue  Negative for chills and fever  HENT: Negative for ear pain and sore throat  Eyes: Negative for pain and visual disturbance  Respiratory: Negative for cough and shortness of breath  Cardiovascular: Negative for chest pain and palpitations  Gastrointestinal: Negative for abdominal pain and vomiting  Genitourinary: Positive for hematuria  Negative for dysuria  Musculoskeletal: Negative for arthralgias and back pain  Skin: Negative for color change and rash  Neurological: Positive for weakness  Negative for seizures and syncope  All other systems reviewed and are negative  Oncology History:   Cancer Staging   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)  Staging form: Urinary Bladder, AJCC 8th Edition  - Clinical stage from 10/18/2021: Stage II (cT2, cN0, cM0) - Signed by Catherine Webber MD on 11/17/2021    Oncology History   Bladder carcinoma ([facility] )   8/16/2016 Initial Diagnosis    Bladder carcinoma ([address] )     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)    Initial Diagnosis    Malignant neoplasm of anterior wall of urinary bladder ([address] )     1994 Surgery    TURBT      1994 - 1995 Chemotherapy    Induction intravesical BCG x 2      8/17/2016 Surgery    TURBT      12/13/2016 Surgery    TURBT  [facility])    Bladder, left posterior wall, biopsy: High-grade urothelial carcinoma in-situ  Muscularis propria is identified with no tumor seen       Bladder, trigone, biopsy: Non-invasive high-grade papillary urothelial carcinoma, and flat urothelial carcinoma in-situ  Muscularis propria is not identified  1/4/2017 - 2/8/2017 Chemotherapy    Induction intravesical BCG     6/19/2017 Surgery    TURBT  [facility])    - Posterior wall, biopsy: Mild chronic cystitis with focal urothelial atypia  Muscularis propria is identified      - Right lateral wall, biopsy: Granulomatous cystitis  Muscularis propria is identified  - Left lateral wall, biopsy: Non invasive high-grade urothelial carcinoma  Muscularis propria is not identified  4/26/2019 Surgery    TURBT   [facility])     - bladder, right posterior wall, biopsy: Urothelial carcinoma in situ   - bladder, right lateral wall, biopsy: Small focus of urothelial carcinoma in situ  - bladder, left posterior wall, biopsy: Urothelial carcinoma in situ   - bladder, left lateral wall, biopsy: Urothelial carcinoma in situ     - bladder, trigone, biopsy: Invasive high-grade urothelial carcinoma, invading into lamina propria  No lymphovascular invasion seen  Muscularis propria not present  Separate piece showing urothelial carcinoma in situ        7/2019 -  Chemotherapy    Induction intravesical BCG x 4      11/4/2019 Surgery    TURBT  [facility])    - Superficial bladder tumor, transurethral resection: Non-invasive high grade urothelial carcinoma, with urothelial carcinoma in situ  Muscularis propria is not identified      - Bladder tumor, transurethral resection: Non-invasive high grade urothelial carcinoma, with urothelial carcinoma in situ, see note  Muscularis propria is present and free of tumor  12/9/2019 - 1/28/2020 Chemotherapy    Induction intravesical BCG+INF        6/9/2020 - 6/23/2020 Chemotherapy    Maintenance intravesical BCG+INF        11/19/2020 Biopsy    TURBT (Fran Brewer, Dr Cherise Valencia)    A   Urinary Bladder, Left Posterior Bladder Wall:  -Extensively- denuded urothelial lined mucosa with mild chronic inflammation, vascular congestion  And edema   -Unremarkable small fragment of detrusor muscle present  -No evidence of invasive urothelial carcinoma seen     B  Urinary Bladder, Left lateral bladder Wall:  -Partially-denuded urothelial lined mucosa with mild  chronic inflammation  -Unremarkable small fragment of detrusor muscle present  -No evidence of invasive urothelial carcinoma seen     C  Urinary Bladder, Right Posterior Bladder wall:  -Urothelial lined mucosa with mild  chronic inflammation Von Brunn nests and mild urothelial atypia, possibly due to previous BCG administration   -Unremarkable small fragment of detrusor muscle present  -No evidence of invasive urothelial carcinoma seen     D  Urinary Bladder, Right Lateral Bladder Wall:  - Urothelial lined mucosa with mild  chronic inflammation   -Muscularis propria/ detrusor muscle is not present for evaluation    -No evidence of invasive urothelial carcinoma seen  E  Prostate, Prostate Tissue:  -Urothelial lined mucosa with mild chronic inflammation, prominent Von Brunn nests and Cystitis cystica   -Unremarkable small fragment of detrusor muscle present   -No evidence of malignancy seen  10/18/2021 -  Cancer Staged    Staging form: Urinary Bladder, AJCC 8th Edition  - Clinical stage from 10/18/2021: Stage II (cT2, cN0, cM0) - Signed by Venus Marroquin MD on 11/17/2021  Stage prefix: Initial diagnosis       10/18/2021 [facility]    [facility]    A  Left posterior wall, bladder (transurethral resection):     - Urothelial tissue with small atypical cauterized focus favored to represent superficially invasive high-grade urothelial carcinoma  - Muscularis propria (detrusor muscle) present, and negative for carcinoma  - Marked edema and mucosal denudation with thermal artifact noted  B  Anterior wall, bladder (transurethral resection):      - Invasive high-grade urothelial carcinoma  - Carcinoma invades muscularis propria (detrusor muscle)  C   Left lateral wall, bladder (transurethral resection):     - Urothelial tissue with small atypical focus with marked thermal artifact; cannot exclude superficial carcinoma  - Muscularis propria (detrusor muscle) present, and negative for carcinoma        - Marked edema and mucosal denudation with thermal artifact noted     1/24/2022 - 3/26/2022 Chemotherapy    mitoMYcin (MUTAMYCIN), 12 mg/m2 = 28 7 mg (100 % of original dose 12 mg/m2), Intravenous, Once, 1 of 1 cycle  Dose modification: 12 mg/m2 (original dose 12 mg/m2, Cycle 1), 3 mg/m2 (original dose 12 mg/m2, Cycle 1)  Administration: 7 2 mg (1/24/2022)  fluorouracil (ADRUCIL) ambulatory infusion Soln, 500 mg/m2/day = 4,780 mg (50 % of original dose 1,000 mg/m2/day), Intravenous, Over 96 hours, 1 of 1 cycle, Start date: 1/18/2022, End date: 1/23/2022  Dose modification: 500 mg/m2/day (original dose 1,000 mg/m2/day, Cycle 1, Reason: Dose modified as per discussion with consulting physician)     1/24/2022 - 3/24/2022 Radiation    Pelvis:1 6X 21 / 21 275 0 5,775 59      Treatment dates:  C1: 1/24/2022 - 3/24/2022     7/15/2022 - 8/26/2022 Chemotherapy    pembrolizumab (KEYTRUDA) IVPB, 400 mg, Intravenous, Once, 2 of 6 cycles  Administration: 400 mg (7/15/2022), 400 mg (8/26/2022)     9/16/2022 -  Chemotherapy    enfortumab vedotin-ejfv (PADCEV) IVPB, 125 mg (original dose 1 25 mg/kg), Intravenous, Once, 4 of 6 cycles  Dose modification: 125 mg (original dose 1 25 mg/kg, Cycle 1, Reason: Dose modified as per discussion with consulting physician), 1 mg/kg (original dose 1 25 mg/kg, Cycle 3, Reason: Anticipated Tolerance, Comment: falling more)  Administration: 120 mg (9/16/2022), 120 mg (9/23/2022), 120 mg (9/30/2022), 120 mg (10/14/2022), 120 mg (10/21/2022), 120 mg (10/28/2022), 120 mg (11/11/2022), 120 mg (11/25/2022), 100 mg (12/2/2022)         Past Medical History:   Past Medical History:   Diagnosis Date   • Anemia     Last assessed: 9/28/17   • Anxiety    • Arteriosclerotic cardiovascular disease     Last assessed: 9/28/17   • Arthritis    • Bladder cancer ([facility] )     bladder- had BCG treatments   • Chronic kidney disease     Stage IV   • CKD (chronic kidney disease) stage 4, GFR 15-29 ml/min ([facility])    • Colon polyp    • Coronary artery disease     7 stents   • Depression    • Diabetes mellitus ([facility])     IDDM   • GERD (gastroesophageal reflux disease)    • Glaucoma    • Hematuria    • History of fusion of cervical spine    • Hyperlipidemia    • Hypertension    • Insomnia     Last assessed: 11/14/12   • Loss of hearing     has hearing aids but usually does not wear them   • Lung cancer ([facility] )    • Metastatic cancer ([place] [address] )    • Other seasonal allergic rhinitis     Last assessed: 2/10/16   • PAD (peripheral artery disease) ([facility])    • Shortness of breath     on exertion   • Spinal stenosis of lumbar region    • Transient cerebral ischemia     No Residual   • Uses walker     w/c for longer distances       Past Surgical History:   Procedure Laterality Date   • CARDIAC SURGERY      Cath stent placement  Last assessed: 3/9/17  Interventional Catheterization   • CHOLECYSTECTOMY     • COLONOSCOPY     • CYSTOSCOPY      Diagnostic w/biopsy  Sobeida Good  Last assessed: 12/1/14   • CYSTOSCOPY N/A 04/12/2022    Procedure: CYSTOSCOPY  Bladder biopsies  ;  Surgeon: Tyree Rowland MD;  [facility];  Service: Urology   • CYSTOSCOPY W/ RETROGRADES Right 03/01/2022    Procedure: CYSTO; stent removal retrograde;  Surgeon: Tyree Rowland MD;  [facility];  Service: Urology   • CYSTOSCOPY W/ URETERAL STENT PLACEMENT Bilateral 10/18/2021    Procedure: bilateral retrogrades, cytology collection;  Surgeon: Tyree Rowland MD;  [facility];  Service: Urology   • CYSTOURETHROSCOPY      w/cautery   Sobeida Good   • FL RETROGRADE PYELOGRAM  10/18/2021   • FL RETROGRADE PYELOGRAM  10/24/2021   • FL RETROGRADE PYELOGRAM  12/14/2022   • GASTRIC BYPASS For morbid obesity w/Shaji-en-Y   Resolved: 11/17/09   • INCISION AND DRAINAGE OF WOUND Right 02/26/2017    Procedure: INCISION AND DRAINAGE (I&D) EXTREMITY WITH APPLICATION OF GRAFT JACKET;  Surgeon: Ken Gillis DPM;  [facility];  Service:    • INCISION AND DRAINAGE OF WOUND Right 04/25/2017    Procedure: INCISION AND DRAINAGE (I&D) EXTREMITY, APPLICATION OF GRAFT;  Surgeon: Ken Gillis DPM;  [facility];  Service:    • IR BIOPSY OTHER  07/02/2020   • IR LOWER EXTREMITY ANGIOGRAM  02/08/2021   • IR LOWER EXTREMITY ANGIOGRAM  02/11/2021   • IR NEPHROSTOMY TUBE CHECK/CHANGE/REPOSITION/REINSERTION/UPSIZE  04/28/2022   • IR NEPHROSTOMY TUBE CHECK/CHANGE/REPOSITION/REINSERTION/UPSIZE  05/24/2022   • IR NEPHROSTOMY TUBE CHECK/CHANGE/REPOSITION/REINSERTION/UPSIZE  06/07/2022   • IR NEPHROSTOMY TUBE CHECK/CHANGE/REPOSITION/REINSERTION/UPSIZE  07/28/2022   • IR NEPHROSTOMY TUBE CHECK/CHANGE/REPOSITION/REINSERTION/UPSIZE  11/15/2022   • IR NEPHROSTOMY TUBE PLACEMENT  02/25/2022   • IR PORT PLACEMENT  01/17/2022   • IR THORACENTESIS  09/02/2022   • IR THORACENTESIS  09/14/2022   • IR THORACENTESIS WITH TUBE PLACEMENT Left     october   • IR TUNNELED CENTRAL LINE PLACEMENT  12/24/2020   • JOINT REPLACEMENT      christofer knees replaced   • HI AMPUTATION METATARSAL W/TOE SINGLE Left 12/21/2020    Procedure: RAY RESECTION FOOT;  Surgeon: Joshua Weber DPM;  [facility];  Service: Podiatry   • HI AMPUTATION METATARSAL W/TOE SINGLE Left 12/31/2020    Procedure: 5TH MET RESECTION;  Surgeon: Joshua Weber DPM;  [facility];  Service: Podiatry   • HI CYSTO BLADDER W/URETERAL CATHETERIZATION Right 12/14/2022    Procedure: CYSTOSCOPY WITH RETROGRADE PYELOGRAM and CLOT EVACUATION, RIGHT STENT INSERTION, FULGRATION OF BLEEDING POINTS;  Surgeon: Eliana Alston MD;  [facility];  Service: Urology   • HI CYSTO W/IRRIG & EVAC MULTPLE OBSTRUCTING CLOTS N/A 02/10/2021    Procedure: CYSTOSCOPY EVACUATION OF CLOTS, fulguration;  Surgeon: Leigh Wright MD;  [facility];  Service: Urology   • W[place] CYSTO W/IRRIG & EVAC MULTPLE OBSTRUCTING CLOTS N/A 10/24/2021    Procedure: CYSTOSCOPY EVACUATION OF CLOT, fulguration of bleeding vessels, right ureter stent placement, retrograde pyelogram;  Surgeon: Emile Wells MD;  [facility];  Service: Urology   • WY CYSTO W/REMOVAL OF LESIONS SMALL N/A 11/19/2020    Procedure: CYSTO W/BIOPSIES, transurethral prostate bx;  Surgeon: Shannan Moore MD;  [facility];  Service: Urology   • WY CYSTOURETHROSCOPY W/DEST &/RMVL TUMOR LARGE Bilateral 10/18/2021    Procedure: TRANSURETHRAL RESECTION OF BLADDER TUMOR (TURBT);   Surgeon: Regine Alamo MD;  [facility];  Service: Urology   • WY CYSTOURETHROSCOPY WITH BIOPSY N/A 08/16/2016    Procedure: Zondra Smoke;  Surgeon: Yoan Mcadams MD;  [facility];  Service: Urology   • WY Sunita  3RD+ ORD [address] PEL/LXTR [facility] Left 02/08/2021    Procedure: LEG angiogram, CO2 w/limited contrast with balloon angioplasty postertior tibial artery;  Surgeon: Melissa Mehta MD;  [facility];  Service: Vascular   • ROTATOR CUFF REPAIR     • SMALL INTESTINE SURGERY      Surgery Shaji-en-Y   • SPINAL FUSION      lumbar and cervical fusions   • VAC DRESSING APPLICATION Right 86/42/8165    Procedure: APPLICATION VAC DRESSING;  Surgeon: Christiano Coates DPM;  [facility];  Service:    • WOUND DEBRIDEMENT Left 02/16/2021    Procedure: FOOT DEBRIDE, [address] OUT w/graft application;  Surgeon: Christiano Coates DPM;  [facility];  Service: Podiatry       Family History   Problem Relation Age of Onset   • Diabetes Mother    • Heart disease Mother    • Other Mother         High blood pressure   • Heart disease Father    • Diabetes Sister    • Other Sister         High blood pressure   • Kidney disease Sister    • Heart disease Brother    • Other Brother High blood pressure       Social History     Socioeconomic History   • Marital status: /Civil Union     Spouse name: Not on file   • Number of children: Not on file   • Years of education: Not on file   • Highest education level: Not on file   Occupational History   • Not on file   Tobacco Use   • Smoking status: Former     Packs/day: 3 00     Years: 27 00     Pack years: 81 00     Types: Cigarettes     Quit date: 5     Years since quittin 0   • Smokeless tobacco: Never   Vaping Use   • Vaping Use: Never used   Substance and Sexual Activity   • Alcohol use: Never     Comment: beer / liquor   • Drug use: Not Currently     Types: Marijuana     Comment: quit 2019 had medical marijuana   • Sexual activity: Not Currently   Other Topics Concern   • Not on file   Social History Narrative    Consumes 1 cup of coffee and 1 soda per day     Social Determinants of Health     Financial Resource Strain: Not on file   Food Insecurity: No Food Insecurity   • Worried About Running Out of Food in the Last Year: Never true   • Ran Out of Food in the Last Year: Never true   Transportation Needs: No Transportation Needs   • Lack of Transportation (Medical): No   • Lack of Transportation (Non-Medical):  No   Physical Activity: Not on file   Stress: Not on file   Social Connections: Not on file   Intimate Partner Violence: Not on file   Housing Stability: Low Risk    • Unable to Pay for Housing in the Last Year: No   • Number of Places Lived in the Last Year: 1   • Unstable Housing in the Last Year: No         Current Facility-Administered Medications:   •  acetaminophen (TYLENOL) tablet 975 mg, 975 mg, Oral, Q8H [facility], 975 mg at 23 [id number]  •  ALPRAZolam (XANAX) tablet 0 25 mg, 0 25 mg, Oral, BID PRN, EVANGELINA Kan  •  aluminum-magnesium hydroxide-simethicone (MYLANTA) oral suspension 30 mL, 30 mL, Oral, Q6H PRN, EVANGELINA Kan  •  ARIPiprazole (ABILIFY) tablet 2 mg, 2 mg, Oral, Daily, Domínguez Pleasant, CRNP, 2 mg at 01/09/23 0808  •  azelastine (ASTELIN) 0 1 % nasal spray 1 spray, 1 spray, Each Nare, BID PRN, Domínguez Pleasant, CRNP  •  bimatoprost (LUMIGAN) 0 01 % ophthalmic solution 1 drop, 1 drop, Both Eyes, HS, Domínguez Pleasant, CRNP, 1 drop at 01/08/23 2140  •  calcitriol (ROCALTROL) capsule 0 25 mcg, 0 25 mcg, Oral, Daily, Domínguez Pleasant, CRNP, 0 25 mcg at 01/09/23 0808  •  cefepime (MAXIPIME) 1,000 mg in dextrose 5 % 50 mL IVPB, 1,000 mg, Intravenous, Q12H, Domínguez Pleasant, CRNP, Last Rate: 100 mL/hr at 01/09/23 1118, 1,000 mg at 01/09/23 1118  •  cyanocobalamin (VITAMIN B-12) tablet 1,000 mcg, 1,000 mcg, Oral, Daily, Domínguez Pleasant, CRNP, 1,000 mcg at 01/09/23 0808  •  ezetimibe (ZETIA) tablet 10 mg, 10 mg, Oral, Daily, Domínguez Pleasant, CRNP, 10 mg at 01/09/23 7886  •  furosemide (LASIX) tablet 20 mg, 20 mg, Oral, Daily, Domínguez Pleasant, CRNP, 20 mg at 01/09/23 3320  •  gabapentin (NEURONTIN) capsule 300 mg, 300 mg, Oral, HS, Domínguez Pleasant, CRNP, 300 mg at 01/08/23 2139  •  HYDROmorphone HCl (DILAUDID) injection 0 2 mg, 0 2 mg, Intravenous, 4x Daily PRN, Domínguez Pleasant, CRNP, 0 2 mg at 01/08/23 2142  •  insulin glargine (LANTUS) subcutaneous injection 15 Units 0 15 mL, 15 Units, Subcutaneous, HS, Heather Suzon Fleischer, PA-C  •  insulin lispro (HumaLOG) 100 units/mL subcutaneous injection 1-5 Units, 1-5 Units, Subcutaneous, HS, Domínguez Pleasant, CRNP, 2 Units at 01/08/23 2138  •  insulin lispro (HumaLOG) 100 units/mL subcutaneous injection 1-6 Units, 1-6 Units, Subcutaneous, TID AC, 2 Units at 01/09/23 1127 **AND** Fingerstick Glucose (POCT), , , TID AC, EVANGELINA Jolley  •  metoprolol tartrate (LOPRESSOR) tablet 25 mg, 25 mg, Oral, Q12H [facility], EVANGELINA Jolley, 25 mg at 01/09/23 0808  •  multivitamin stress formula tablet 1 tablet, 1 tablet, Oral, Daily, EVANGELINA Jolley, 1 tablet at 01/09/23 0808  •  ondansetron [facility] injection 4 mg, 4 mg, Intravenous, Q6H PRN, MOE PerezNP  •  oxybutynin (DITROPAN) tablet 5 mg, 5 mg, Oral, TID, Roy Gosselin, CRNP, 5 mg at 01/09/23 8394  •  oxyCODONE (ROXICODONE) immediate release tablet 10 mg, 10 mg, Oral, TID PRN, Josselin Vicente, CRNP, 10 mg at 01/09/23 8006  •  pantoprazole (PROTONIX) EC tablet 20 mg, 20 mg, Oral, Early Morning, Josselin Vicente, CRNP, 20 mg at 01/09/23 6452  •  polyethylene glycol (MIRALAX) packet 17 g, 17 g, Oral, Daily PRN, MOE PerezNP  •  senna (SENOKOT) tablet 17 2 mg, 2 tablet, Oral, BID PRN, MOE PerezNP  •  simethicone (MYLICON) chewable tablet 80 mg, 80 mg, Oral, 4x Daily PRN, EVANGELINA Perez    Medications Prior to Admission   Medication   • acetaminophen (TYLENOL) 325 mg tablet   • aspirin (ECOTRIN LOW STRENGTH) 81 mg EC tablet   • Bimatoprost (LUMIGAN OP)   • DULoxetine (CYMBALTA) 30 mg delayed release capsule   • fluticasone (FLONASE) 50 mcg/act nasal spray   • furosemide (LASIX) 20 mg tablet   • gabapentin (NEURONTIN) 300 mg capsule   • insulin lispro (HumaLOG) 100 units/mL injection   • multivitamin (THERAGRAN) TABS   • omeprazole (PriLOSEC) 20 mg delayed release capsule   • Accu-Chek Softclix Lancets lancets   • ARIPiprazole (ABILIFY) 2 mg tablet   • Azelastine HCl 0 15 % SOLN   • calcitriol (ROCALTROL) 0 25 mcg capsule   • cyanocobalamin (VITAMIN B-12) 1000 MCG tablet   • ezetimibe (ZETIA) 10 mg tablet   • Ferrous Sulfate Dried (Feosol) 200 (65 Fe) MG TABS   • Insulin Pen Needle 31G X 8 MM MISC   • Lantus SoloStar 100 units/mL injection pen   • loperamide (IMODIUM) 2 mg capsule   • polyethylene glycol (MIRALAX) 17 g packet   • senna (SENOKOT) 8 6 MG tablet       Allergies   Allergen Reactions   • Atorvastatin Hives, Itching and Rash   • Simvastatin Rash and Edema     Edema of lower legs   • Statins Hives and Itching   • Insulin Lispro Swelling and Edema     " Lower Legs"   • Other Itching, Rash and Other (See Comments)     "EKG Patches"   "blue EKG patches"         Physical Exam:     /64   Pulse 70   Temp 98 1 °F (36 7 °C) (Oral)   Resp 18   Wt 90 kg (198 lb 6 6 oz)   SpO2 97%   BMI 24 15 kg/m²     Physical Exam  HENT:      Head: Normocephalic  Mouth/Throat:      Mouth: Mucous membranes are moist    Eyes:      Extraocular Movements: Extraocular movements intact  Conjunctiva/sclera: Conjunctivae normal    Cardiovascular:      Rate and Rhythm: Normal rate and regular rhythm  Pulmonary:      Effort: Pulmonary effort is normal       Breath sounds: Normal breath sounds  Comments: On room air  Abdominal:      General: Abdomen is flat  Bowel sounds are normal       Palpations: Abdomen is soft  Skin:     General: Skin is warm and dry  Neurological:      General: No focal deficit present  Mental Status: He is alert and oriented to person, place, and time  Motor: Weakness present  Psychiatric:         Mood and Affect: Mood normal          Behavior: Behavior normal          Thought Content: Thought content normal          Judgment: Judgment normal            Recent Results (from the past 48 hour(s))   ECG 12 lead    Collection Time: 01/07/23  3:02 PM   Result Value Ref Range    Ventricular Rate 78 BPM    Atrial Rate 78 BPM    AZ Interval 184 ms    QRSD Interval 100 ms    QT Interval 388 ms    QTC Interval 442 ms    P Dixon 21 degrees    QRS Axis -13 degrees    T Wave Axis 80 degrees   Blood culture #2    Collection Time: 01/07/23  4:11 PM    Specimen: Line, Venous; Blood   Result Value Ref Range    Blood Culture No Growth at 24 hrs      CBC and differential    Collection Time: 01/07/23  4:13 PM   Result Value Ref Range    WBC 9 17 4 31 - 10 16 Thousand/uL    RBC 2 78 (L) 3 88 - 5 62 Million/uL    Hemoglobin 8 3 (L) 12 0 - 17 0 g/dL    Hematocrit 26 0 (L) 36 5 - 49 3 %    MCV 94 82 - 98 fL    MCH 29 9 26 8 - 34 3 pg    MCHC 31 9 31 4 - 37 4 g/dL    RDW 15 9 (H) 11 6 - 15 1 %    MPV 10 5 8 9 - 12 7 fL    Platelets 589 109 - 928 Thousands/uL    nRBC 0 /100 WBCs    Neutrophils Relative 60 43 - 75 %    Immat GRANS % 1 0 - 2 %    Lymphocytes Relative 26 14 - 44 %    Monocytes Relative 6 4 - 12 %    Eosinophils Relative 7 (H) 0 - 6 %    Basophils Relative 0 0 - 1 %    Neutrophils Absolute 5 57 1 85 - 7 62 Thousands/µL    Immature Grans Absolute 0 05 0 00 - 0 20 Thousand/uL    Lymphocytes Absolute 2 37 0 60 - 4 47 Thousands/µL    Monocytes Absolute 0 55 0 17 - 1 22 Thousand/µL    Eosinophils Absolute 0 61 0 00 - 0 61 Thousand/µL    Basophils Absolute 0 02 0 00 - 0 10 Thousands/µL   Basic metabolic panel    Collection Time: 01/07/23  4:13 PM   Result Value Ref Range    Sodium 135 135 - 147 mmol/L    Potassium 5 5 (H) 3 5 - 5 3 mmol/L    Chloride 101 96 - 108 mmol/L    CO2 25 21 - 32 mmol/L    ANION GAP 9 5 - 14 mmol/L    BUN 57 (H) 5 - 25 mg/dL    Creatinine 3 21 (H) 0 70 - 1 50 mg/dL    Glucose 207 (H) 70 - 99 mg/dL    Calcium 8 9 8 4 - 10 2 mg/dL    eGFR 17 ml/min/1 73sq m   Hepatic function panel    Collection Time: 01/07/23  4:13 PM   Result Value Ref Range    Total Bilirubin 0 63 0 20 - 1 00 mg/dL    Bilirubin, Direct 0 55 (H) 0 00 - 0 20 mg/dL    Alkaline Phosphatase 96 43 - 122 U/L    AST 34 17 - 59 U/L    ALT 24 <50 U/L    Total Protein 7 6 6 4 - 8 4 g/dL    Albumin 3 4 (L) 3 5 - 5 0 g/dL   Lactic acid, plasma    Collection Time: 01/07/23  4:13 PM   Result Value Ref Range    LACTIC ACID 1 4 0 5 - 2 0 mmol/L   Protime-INR    Collection Time: 01/07/23  4:13 PM   Result Value Ref Range    Protime 12 5 11 6 - 14 5 seconds    INR 0 90 0 84 - 1 19   APTT    Collection Time: 01/07/23  4:13 PM   Result Value Ref Range    PTT 36 23 - 37 seconds   Blood culture #1    Collection Time: 01/07/23  4:13 PM    Specimen: Arm, Left; Blood   Result Value Ref Range    Blood Culture No Growth at 24 hrs      High Sensitivity Troponin I Random    Collection Time: 01/07/23  4:13 PM   Result Value Ref Range    HS TnI random 10 8 - 18 ng/L   NT-BNP PRO    Collection Time: 01/07/23  4:13 PM   Result Value Ref Range    NT-proBNP 2,590 (H) 0 - 299 pg/mL   Iron Saturation %    Collection Time: 01/07/23  4:13 PM   Result Value Ref Range    Iron Saturation 12 (L) 20 - 50 %    TIBC 180 (L) 250 - 450 ug/dL    Iron 22 (L) 65 - 175 ug/dL   Ferritin    Collection Time: 01/07/23  4:13 PM   Result Value Ref Range    Ferritin 217 8 - 388 ng/mL   UA w Reflex to Microscopic w Reflex to Culture    Collection Time: 01/07/23  4:53 PM    Specimen: Urine, Clean Catch   Result Value Ref Range    Color, UA Red (A) Straw, Yellow, Pale Yellow    Clarity, UA Bloody (A) Clear, Other    Specific Gravity, UA 1 015 1 003 - 1 040    pH, UA 6 5 4 5, 5 0, 5 5, 6 0, 6 5, 7 0, 7 5, 8 0    Leukocytes,  0 (A) Negative    Nitrite, UA Negative Negative    Protein, UA >=500 (A) Negative mg/dl    Glucose, UA Negative Negative mg/dl    Ketones, UA 5 (Trace) (A) Negative mg/dl    Bilirubin, UA Negative Negative    Occult Blood,  0 (A) Negative    UROBILINOGEN UA Negative 1 0, Negative mg/dL   Urine culture    Collection Time: 01/07/23  4:53 PM    Specimen: Urine, Other   Result Value Ref Range    Urine Culture >100,000 cfu/ml Klebsiella-Enterobacter  group (A)    Urine Microscopic    Collection Time: 01/07/23  4:53 PM   Result Value Ref Range    RBC, UA Innumerable (A) None Seen, 0-1, 1-2, 2-4, 0-5 /hpf    WBC, UA Innumerable (A) None Seen, 0-1, 1-2, 0-5, 2-4 /hpf    Epithelial Cells Occasional None Seen, Occasional /hpf    Bacteria, UA Innumerable (A) None Seen, Occasional /hpf   CBC and differential    Collection Time: 01/07/23  7:07 PM   Result Value Ref Range    WBC 9 78 4 31 - 10 16 Thousand/uL    RBC 2 39 (L) 3 88 - 5 62 Million/uL    Hemoglobin 7 1 (L) 12 0 - 17 0 g/dL    Hematocrit 22 3 (L) 36 5 - 49 3 %    MCV 93 82 - 98 fL    MCH 29 7 26 8 - 34 3 pg    MCHC 31 8 31 4 - 37 4 g/dL    RDW 15 7 (H) 11 6 - 15 1 %    MPV 10 0 8 9 - 12 7 fL    Platelets 731 149 - 390 Thousands/uL    nRBC 0 /100 WBCs    Neutrophils Relative 59 43 - 75 %    Immat GRANS % 1 0 - 2 %    Lymphocytes Relative 27 14 - 44 %    Monocytes Relative 6 4 - 12 %    Eosinophils Relative 7 (H) 0 - 6 %    Basophils Relative 0 0 - 1 %    Neutrophils Absolute 5 79 1 85 - 7 62 Thousands/µL    Immature Grans Absolute 0 05 0 00 - 0 20 Thousand/uL    Lymphocytes Absolute 2 62 0 60 - 4 47 Thousands/µL    Monocytes Absolute 0 63 0 17 - 1 22 Thousand/µL    Eosinophils Absolute 0 67 (H) 0 00 - 0 61 Thousand/µL    Basophils Absolute 0 02 0 00 - 0 10 Thousands/µL   Type and screen    Collection Time: 01/07/23  8:12 PM   Result Value Ref Range    ABO Grouping O     Rh Factor Positive     Antibody Screen Negative     Specimen Expiration Date 20230110    Fingerstick Glucose (POCT)    Collection Time: 01/07/23 11:57 PM   Result Value Ref Range    POC Glucose 174 (H) 65 - 140 mg/dl   CBC and differential    Collection Time: 01/08/23  5:39 AM   Result Value Ref Range    WBC 7 39 4 31 - 10 16 Thousand/uL    RBC 2 53 (L) 3 88 - 5 62 Million/uL    Hemoglobin 7 7 (L) 12 0 - 17 0 g/dL    Hematocrit 23 6 (L) 36 5 - 49 3 %    MCV 93 82 - 98 fL    MCH 30 4 26 8 - 34 3 pg    MCHC 32 6 31 4 - 37 4 g/dL    RDW 15 5 (H) 11 6 - 15 1 %    MPV 10 2 8 9 - 12 7 fL    Platelets 700 166 - 408 Thousands/uL    nRBC 0 /100 WBCs    Neutrophils Relative 74 43 - 75 %    Immat GRANS % 0 0 - 2 %    Lymphocytes Relative 11 (L) 14 - 44 %    Monocytes Relative 5 4 - 12 %    Eosinophils Relative 10 (H) 0 - 6 %    Basophils Relative 0 0 - 1 %    Neutrophils Absolute 5 48 1 85 - 7 62 Thousands/µL    Immature Grans Absolute 0 03 0 00 - 0 20 Thousand/uL    Lymphocytes Absolute 0 80 0 60 - 4 47 Thousands/µL    Monocytes Absolute 0 36 0 17 - 1 22 Thousand/µL    Eosinophils Absolute 0 70 (H) 0 00 - 0 61 Thousand/µL    Basophils Absolute 0 02 0 00 - 0 10 Thousands/µL   Basic metabolic panel    Collection Time: 01/08/23  5:39 AM   Result Value Ref Range Sodium 137 135 - 147 mmol/L    Potassium 4 8 3 5 - 5 3 mmol/L    Chloride 103 96 - 108 mmol/L    CO2 25 21 - 32 mmol/L    ANION GAP 9 4 - 13 mmol/L    BUN 53 (H) 5 - 25 mg/dL    Creatinine 3 18 (H) 0 60 - 1 30 mg/dL    Glucose 106 65 - 140 mg/dL    Calcium 8 4 8 3 - 10 1 mg/dL    eGFR 17 ml/min/1 73sq m   Prepare Leukoreduced RBC: 1 Units, Leukoreduced    Collection Time: 01/08/23  6:15 AM   Result Value Ref Range    Unit Product Code M8721V86     Unit Number [id number]     Unit ABO O     Unit [facility] POS     Crossmatch Compatible     Unit Dispense Status Presumed Trans     Unit Product Volume 300 ml   Fingerstick Glucose (POCT)    Collection Time: 01/08/23  7:21 AM   Result Value Ref Range    POC Glucose 95 65 - 140 mg/dl   Fingerstick Glucose (POCT)    Collection Time: 01/08/23 11:32 AM   Result Value Ref Range    POC Glucose 128 65 - 140 mg/dl   Fingerstick Glucose (POCT)    Collection Time: 01/08/23  4:31 PM   Result Value Ref Range    POC Glucose 124 65 - 140 mg/dl   Fingerstick Glucose (POCT)    Collection Time: 01/08/23  8:37 PM   Result Value Ref Range    POC Glucose 238 (H) 65 - 140 mg/dl   CBC and differential    Collection Time: 01/09/23  5:14 AM   Result Value Ref Range    WBC 9 47 4 31 - 10 16 Thousand/uL    RBC 2 39 (L) 3 88 - 5 62 Million/uL    Hemoglobin 7 3 (L) 12 0 - 17 0 g/dL    Hematocrit 22 6 (L) 36 5 - 49 3 %    MCV 95 82 - 98 fL    MCH 30 5 26 8 - 34 3 pg    MCHC 32 3 31 4 - 37 4 g/dL    RDW 15 5 (H) 11 6 - 15 1 %    MPV 10 3 8 9 - 12 7 fL    Platelets 249 582 - 887 Thousands/uL    nRBC 0 /100 WBCs    Neutrophils Relative 59 43 - 75 %    Immat GRANS % 1 0 - 2 %    Lymphocytes Relative 24 14 - 44 %    Monocytes Relative 7 4 - 12 %    Eosinophils Relative 9 (H) 0 - 6 %    Basophils Relative 0 0 - 1 %    Neutrophils Absolute 5 65 1 85 - 7 62 Thousands/µL    Immature Grans Absolute 0 05 0 00 - 0 20 Thousand/uL    Lymphocytes Absolute 2 25 0 60 - 4 47 Thousands/µL    Monocytes Absolute 0 64 0 17 - 1 22 Thousand/µL    Eosinophils Absolute 0 85 (H) 0 00 - 0 61 Thousand/µL    Basophils Absolute 0 03 0 00 - 0 10 Thousands/µL   Basic metabolic panel    Collection Time: 01/09/23  5:14 AM   Result Value Ref Range    Sodium 139 135 - 147 mmol/L    Potassium 4 5 3 5 - 5 3 mmol/L    Chloride 106 96 - 108 mmol/L    CO2 26 21 - 32 mmol/L    ANION GAP 7 4 - 13 mmol/L    BUN 47 (H) 5 - 25 mg/dL    Creatinine 3 11 (H) 0 60 - 1 30 mg/dL    Glucose 53 (L) 65 - 140 mg/dL    Calcium 7 8 (L) 8 3 - 10 1 mg/dL    eGFR 18 ml/min/1 73sq m   Fingerstick Glucose (POCT)    Collection Time: 01/09/23  7:21 AM   Result Value Ref Range    POC Glucose 78 65 - 140 mg/dl   Fingerstick Glucose (POCT)    Collection Time: 01/09/23 11:24 AM   Result Value Ref Range    POC Glucose 207 (H) 65 - 140 mg/dl       CT abdomen pelvis wo contrast    Result Date: 1/7/2023  Narrative: CT ABDOMEN AND PELVIS WITHOUT IV CONTRAST INDICATION:   Abdominal pain, acute, nonlocalized ABd pain, nephrostomy in plac  COMPARISON:  CT abdomen/pelvis 12/12/2022  TECHNIQUE:  CT examination of the abdomen and pelvis was performed without intravenous contrast  Axial, sagittal, and coronal 2D reformatted images were created from the source data and submitted for interpretation  Radiation dose length product (DLP) for this visit:  695 mGy-cm   This examination, like all CT scans performed in the [facility], was performed utilizing techniques to minimize radiation dose exposure, including the use of iterative reconstruction and automated exposure control  Enteric contrast was not administered  FINDINGS: ABDOMEN LOWER CHEST:  Stable small left pleural effusion with drainage catheter in place  Stable adjacent atelectasis  Stable epicardial soft tissue  LIVER/BILIARY TREE:  Unremarkable  GALLBLADDER:  Gallbladder is surgically absent  SPLEEN:  Unremarkable  PANCREAS:  Unremarkable  ADRENAL GLANDS:  Unremarkable   KIDNEYS/URETERS:  Stable left percutaneous nephrostomy tube  No left hydronephrosis  Mild distal right hydroureter, similar to the prior study  Stable left renal cysts  Punctate nonobstructing left renal lower pole calculus  Stable right percutaneous nephrostomy tube  Interval placement of a right double-J nephroureteral stent  Stable mild upper pole caliectasis  Interval resolution of previously seen lower pole caliectasis and hydroureter  Likely persistent hyperdensity  in the distal right ureter  Stable right renal cysts  STOMACH AND BOWEL:  There is colonic diverticulosis without evidence of acute diverticulitis  Postsurgical changes of Shaji-en-Y gastric bypass  No bowel obstruction  APPENDIX:  No findings to suggest appendicitis  ABDOMINOPELVIC CAVITY:  No ascites  No pneumoperitoneum  Stable retroperitoneal lymphadenopathy measuring up to 1 6 cm in short axis (series 2 image 44)  VESSELS:  Atherosclerotic changes are present  No evidence of aneurysm  PELVIS REPRODUCTIVE ORGANS:  Unremarkable for patient's age  URINARY BLADDER:  Stable bladder wall thickening, compatible with known malignancy  Increased hyperdensity within the bladder lumen, likely a blood clot  ABDOMINAL WALL/INGUINAL REGIONS:  Mild body wall edema, stable  OSSEOUS STRUCTURES:  No acute fracture or destructive osseous lesion  Spinal degenerative changes are noted  Moderate bilateral hip joint osteoarthritis  Impression: 1  Stable bladder wall thickening, compatible with known malignancy  Increased hyperdensity within the bladder lumen, compatible with new blood clot  2   Stable bilateral percutaneous nephrostomy tubes with interval placement of a right double-J nephroureteral stent  Stable mild right upper pole caliectasis, with resolution of previously seen lower pole caliectasis and hydroureter  Persistent blood products in the distal right ureter  No left hydronephrosis  3   Stable small left pleural effusion with drainage catheter in place    Stable epicardial metastasis  4   Stable metastatic retroperitoneal lymphadenopathy  Workstation performed: [id number]     CT abdomen pelvis wo contrast    Result Date: 12/12/2022  Narrative: CT ABDOMEN AND PELVIS WITHOUT IV CONTRAST INDICATION:   evaluate bilateral neprhrostomy tube placement; pt feels it is dislodged and having hematuria  COMPARISON:  CT scan from 11/17/2022  TECHNIQUE:  CT examination of the abdomen and pelvis was performed without intravenous contrast  Axial, sagittal, and coronal 2D reformatted images were created from the source data and submitted for interpretation  Radiation dose length product (DLP) for this visit:  1097 44 mGy-cm   This examination, like all CT scans performed in the [facility], was performed utilizing techniques to minimize radiation dose exposure, including the use of iterative reconstruction and automated exposure control  Enteric contrast was not administered  FINDINGS: ABDOMEN LOWER CHEST:  Stable left pleural effusion with drainage catheter in place  Stable adjacent atelectasis  Stable epicardial nodules likely lymph nodes the largest unchanged measuring 3 6 cm  LIVER/BILIARY TREE:  Unremarkable  GALLBLADDER:  Removed  SPLEEN:  Unremarkable  PANCREAS:  Unremarkable  ADRENAL GLANDS:  Unremarkable  KIDNEYS/URETERS:  Interval development of right-sided hydroureteronephrosis despite the low percutaneous nephrostomy tube  Hypodensity in the distal right ureter likely blood  Stable appearance of the left kidney with left-sided percutaneous nephrostomy tube  Stable left renal cyst  STOMACH AND BOWEL:  Status post gastric bypass surgery  No bowel obstruction  APPENDIX:  No findings to suggest appendicitis  ABDOMINOPELVIC CAVITY:  No ascites  No pneumoperitoneum  Stable retroperitoneal adenopathy the largest measuring 2 2 cm on image 2/42  VESSELS:  Unremarkable for patient's age  PELVIS REPRODUCTIVE ORGANS:  Unremarkable for patient's age   URINARY BLADDER: Stable appearance of the bladder with asymmetric thickening of the left bladder wall in keeping with history of bladder cancer  ABDOMINAL WALL/INGUINAL REGIONS:  Unremarkable  OSSEOUS STRUCTURES:  No acute fracture or destructive osseous lesion  Impression: Stable bladder wall thickening towards the left compatible with known neoplasm  New right hydronephrosis with high density intraluminal material in the distal right ureter likely representing blood  Stable metastatic retroperitoneal lymphadenopathy  Stable left pleural effusion with epicardial metastasis  Workstation performed: [id number]     XR chest portable    Result Date: 12/20/2022  Narrative: CHEST INDICATION:   sob  COMPARISON:  11/17/2022 EXAM PERFORMED/VIEWS:  XR CHEST PORTABLE FINDINGS:  Low lung volumes  Clear right lung  Small, similar left pleural effusion with presumed compressive atelectasis  No pneumothorax  Heart, mediastinal and hilar structures are within normal limits  Right chest wall port in the distal SVC  No acute osseous or soft tissue pathology  Stable position of loop recorder  Right rotator cuff repair  Impression: Small left pleural effusion, similar to 11/17/2022  Workstation performed: [id number]     XR chest pa & lateral    Result Date: 1/6/2023  Narrative: CHEST INDICATION:   Cough  COMPARISON:  Chest radiograph 12/30/2022  EXAM PERFORMED/VIEWS:  XR CHEST PA & LATERAL  FINDINGS:  Unchanged position of right vascular access port, left basilar chest tube, and Loop recorder in the left lower hemithorax  Stable mildly elevated left hemidiaphragm  Stable small left pleural effusion and left basilar atelectasis/scarring  No pneumothorax  Cardiomediastinal silhouette appears unremarkable  Degenerative changes of the bilateral glenohumeral joints, right acromioclavicular joint, and the visualized thoracic spine  Partially visualized anterior spinal fusion of the cervical spine       Impression: No significant change since prior study 12/30/2022  Workstation performed: [id number]     XR chest pa & lateral    Result Date: 12/30/2022  Narrative: CHEST INDICATION:   Pleural effusion  COMPARISON:  December 19, 2022 EXAM PERFORMED/VIEWS:  XR CHEST PA & LATERAL FINDINGS:  Right chest port  Loop recorder in the subcutaneous tissues of the left anterior chest wall  Cardiomediastinal silhouette appears unremarkable  Pleural catheter in the base of the left hemithorax reidentified  Small residual left pleural effusion, slightly decreased when compared to previous examination  Left basilar atelectasis/scarring  No pneumothorax  Left glenohumeral and spinal degenerative changes  No acute osseous abnormality  Impression: Decreasing lateral left costophrenic angle effusion with overlying basilar atelectasis  Workstation performed: [id number]     XR humerus left    Result Date: 12/16/2022  Narrative: LEFT HUMERUS INDICATION:   left humeral pain, hx of fall on thanksgiving  COMPARISON:  Left elbow /shoulder radiographs 3/1/2022  VIEWS:  XR HUMERUS LEFT Images: 4 FINDINGS: There is no acute fracture or dislocation  Suture anchors in the humeral head  Mild glenohumeral and AC joint osteoarthritis  Olecranon spur  Calcification adjacent to the epicondyles  No lytic or blastic osseous lesion  There are atherosclerotic calcifications  Soft tissues are otherwise unremarkable  Impression: No acute osseous abnormality  Workstation performed: [id number]     XR humerus right    Result Date: 12/16/2022  Narrative: RIGHT HUMERUS INDICATION:   right wrist drop, hx of fall thanksgiving, concern for humerus fx  COMPARISON:  Right ankle radiographs 7/25/2015  VIEWS:  XR HUMERUS RIGHT Images: 2 FINDINGS: There is no acute fracture or dislocation  Moderate degenerative changes of the glenohumeral joint with high riding humerus  Multiple suture anchors in the humeral head  Moderate degenerative changes at the [facility] joint  Olecranon spur   Again noted is a small ossified body posterior to the distal humerus not significantly changed from 2015  No lytic or blastic osseous lesion  Soft tissues are unremarkable  Impression: No acute osseous abnormality  Workstation performed: [id number]     XR ankle 3+ vw left    Result Date: 1/3/2023  Narrative: LEFT ANKLE INDICATION:   L ankle swelling and pain after fall  COMPARISON:  July 30, 2021 VIEWS:  XR ANKLE 3+ VW LEFT FINDINGS: There is no acute fracture or dislocation  Mild left ankle osteoarthritis reidentified  No lytic or blastic osseous lesion  There are atherosclerotic calcifications  Soft tissues are otherwise unremarkable  Impression: No acute osseous abnormality  Workstation performed: [id number]     CT head wo contrast    Result Date: 1/2/2023  Narrative: CT BRAIN - WITHOUT CONTRAST INDICATION:   Head trauma, minor (Age >= 65y) fall  COMPARISON: CT from December 27, 2022 TECHNIQUE:  CT examination of the brain was performed  In addition to axial images, sagittal and coronal 2D reformatted images were created and submitted for interpretation  Radiation dose length product (DLP) for this visit:  975 mGy-cm   This examination, like all CT scans performed in the [facility], was performed utilizing techniques to minimize radiation dose exposure, including the use of iterative reconstruction and automated exposure control  IMAGE QUALITY:  Diagnostic  FINDINGS: PARENCHYMA:  No intracranial mass, mass effect or midline shift  No CT signs of acute infarction  No acute parenchymal hemorrhage  Mild periventricular white matter hypodensity seen related to chronic small vessel ischemic changes VENTRICLES AND EXTRA-AXIAL SPACES:  Normal for the patient's age  VISUALIZED ORBITS AND PARANASAL SINUSES:  Normal visualized orbits  Normal visualized paranasal sinuses   CALVARIUM AND EXTRACRANIAL SOFT TISSUES:  Normal      Impression: No acute intracranial hemorrhage seen No mass effect or midline shift seen Workstation performed: [id number]     CT head without contrast    Result Date: 12/28/2022  Narrative: CT BRAIN - WITHOUT CONTRAST INDICATION:   R53 1: Weakness  COMPARISON:  CT brain dated May 4, 2022  TECHNIQUE:  CT examination of the brain was performed  In addition to axial images, sagittal and coronal 2D reformatted images were created and submitted for interpretation  Radiation dose length product (DLP) for this visit:  [phone] mGy-cm   This examination, like all CT scans performed in the [facility], was performed utilizing techniques to minimize radiation dose exposure, including the use of iterative reconstruction and automated exposure control  IMAGE QUALITY:  Diagnostic  FINDINGS: PARENCHYMA:  No intracranial mass, mass effect or midline shift  No CT signs of acute infarction  No acute parenchymal hemorrhage  There is mild periventricular white matter low attenuation which is nonspecific and most likely related to chronic small vessel ischemic changes  VENTRICLES AND EXTRA-AXIAL SPACES:  Ventricles and extra-axial CSF spaces are prominent commensurate with the degree of volume loss  No hydrocephalus  No acute extra-axial hemorrhage  VISUALIZED ORBITS AND PARANASAL SINUSES:  Normal visualized orbits  Normal visualized paranasal sinuses  CALVARIUM AND EXTRACRANIAL SOFT TISSUES:  Normal      Impression: No acute intracranial abnormality  Mild chronic small vessel ischemic changes and volume loss  Workstation performed: [id number]     CT spine cervical without contrast    Result Date: 1/2/2023  Narrative: CT CERVICAL SPINE - WITHOUT CONTRAST INDICATION:   Neck trauma (Age >= 65y) fall  COMPARISON:  December 7, 2018 TECHNIQUE:  CT examination of the cervical spine was performed without intravenous contrast   Contiguous axial images were obtained  Sagittal and coronal reconstructions were performed  Radiation dose length product (DLP) for this visit:  464 mGy-cm     This examination, like all CT scans performed in the [facility], was performed utilizing techniques to minimize radiation dose exposure, including the use of iterative reconstruction and automated exposure control  IMAGE QUALITY:  Diagnostic  FINDINGS: ALIGNMENT:  Normal alignment of the cervical spine  No subluxation  There is a reduction the cervical lordosis Anterior cervical fusion extending from C4 through C6 VERTEBRAL BODIES:  No fracture  DEGENERATIVE CHANGES:  C2-3 level appear unremarkable C3-4 level demonstrates facet joint disease with severe left foraminal narrowing  There is mild right foraminal narrowing  C4-5 demonstrates degenerative disc disease with the ridging with mild right and mild left foraminal narrowing C5-C6 demonstrates degenerative disc disease with uncovertebral spurring with mild right and mild left foraminal narrowing C6/7 demonstrates ridging with degenerative disease, there is moderate central canal narrowing  There is moderate right and moderate left foraminal narrowing C7-T1 level appear unremarkable PREVERTEBRAL AND PARASPINAL SOFT TISSUES: Unremarkable Right thyroid nodule seen measuring about 8 mm, doesn't meet the criteria for further evaluation with the ultrasound for incidentally detected nodule THORACIC INLET:  Normal      Impression: No acute compression collapse moderate central canal narrowing at C6/7 Multilevel facet joint disease with multilevel foraminal narrowing  Workstation performed: [id number]     FL retrograde pyelogram    Result Date: 12/14/2022  Narrative: C-[facility] - retrograde pyelogram INDICATION: Hematuria   Procedure guidance  COMPARISON:  CT abdomen pelvis 12/12/2022  TECHNIQUE: FLUOROSCOPY TIME:   60 3 seconds 8 FLUOROSCOPIC IMAGES FINDINGS: Fluoroscopic guidance provided for procedure guidance  Submitted images demonstrate cannulation of the right ureter with subsequent contrast administration and right ureteral stent placement   Bilateral percutaneous nephrostomy tubes are noted  Osseous and soft tissue detail limited by technique  Impression: Fluoroscopic guidance provided for procedure guidance  Please refer to the separate procedure notes for additional details  Workstation performed: [id number]     VAS upper limb venous duplex scan, unilateral/limited    Result Date: 12/21/2022  Narrative:  [facility] REPORT [facility]INICAL: Indications: Patient presents with upper lower extremity pain  Patient feels numbness due to IV  Operative History: 2021-02-11 Left Angiogram 2021-02-08 Left Angiogram with angioplasty of posterior tibial 2020-12-31 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 Tunneled central line placement 2020-12-21 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 Cardiac cath with stent placement 2006-01-01 Cardiac cath with stent placement Risk Factors The patient has history of HTN, Diabetes (IDDM), CKD, PAD, CAD and previous smoking (quit >10yrs ago)  CONCLUSION:  Impression RIGHT UPPER LIMB: No evidence of acute or chronic deep vein thrombosis  No evidence of superficial thrombophlebitis noted  Doppler evaluation shows a normal response to augmentation maneuvers  LEFT UPPER LIMB LIMITED: Evaluation shows no evidence of thrombus in the internal jugular vein, subclavian vein, and the brachiocephalic vein  SIGNATURE: Electronically Signed by: Austin Grewal on 2022-12-21 12:40:13 PM      Labs and pertinent reports reviewed  This note has been generated by voice recognition software system  Therefore, there may be spelling, grammar, and or syntax errors  Please contact if questions arise

## 2023-01-09 NOTE — ASSESSMENT & PLAN NOTE
Lab Results   Component Value Date    HGBA1C 7 2 (H) 10/20/2022     · Continue Lantus 15 units with sliding scale TID  · ADA diet    Recent Labs     01/08/23 2037 01/09/23  0721 01/09/23  1124 01/09/23  1556   POCGLU 238* 78 207* 166*       Blood Sugar Average: Last 72 hrs:  (P) 151 25

## 2023-01-09 NOTE — ASSESSMENT & PLAN NOTE
· History of hematuria due to bladder cancer  · Hg 7 1 prior to transfer   Received 1 unit PRBC  · Hemoglobin 7 3 today  · Iron panel showing continued deficiency   · Hold ASA and duloxetine  · Hem/Onc consulted recommending keeping hemoglobin <7 transfuse as needed

## 2023-01-09 NOTE — PLAN OF CARE
Problem: Nutrition/Hydration-ADULT  Goal: Nutrient/Hydration intake appropriate for improving, restoring or maintaining nutritional needs  Description: Monitor and assess patient's nutrition/hydration status for malnutrition  Collaborate with interdisciplinary team and initiate plan and interventions as ordered  Monitor patient's weight and dietary intake as ordered or per policy  Utilize nutrition screening tool and intervene as necessary  Determine patient's food preferences and provide high-protein, high-caloric foods as appropriate       INTERVENTIONS:  - Monitor oral intake, urinary output, labs, and treatment plans  - Assess nutrition and hydration status and recommend course of action  - Evaluate amount of meals eaten  - Assist patient with eating if necessary   - Allow adequate time for meals  - Recommend/ encourage appropriate diets, oral nutritional supplements, and vitamin/mineral supplements  - Order, calculate, and assess calorie counts as needed  - Recommend, monitor, and adjust tube feedings and TPN/PPN based on assessed needs  - Assess need for intravenous fluids  - Provide specific nutrition/hydration education as appropriate  - Include patient/family/caregiver in decisions related to nutrition  Outcome: Progressing     Problem: Prexisting or High Potential for Compromised Skin Integrity  Goal: Skin integrity is maintained or improved  Description: INTERVENTIONS:  - Identify patients at risk for skin breakdown  - Assess and monitor skin integrity  - Assess and monitor nutrition and hydration status  - Monitor labs   - Assess for incontinence   - Turn and reposition patient  - Assist with mobility/ambulation  - Relieve pressure over bony prominences  - Avoid friction and shearing  - Provide appropriate hygiene as needed including keeping skin clean and dry  - Evaluate need for skin moisturizer/barrier cream  - Collaborate with interdisciplinary team   - Patient/family teaching  - Consider wound care consult   Outcome: Progressing     Problem: MOBILITY - ADULT  Goal: Maintain or return to baseline ADL function  Description: INTERVENTIONS:  -  Assess patient's ability to carry out ADLs; assess patient's baseline for ADL function and identify physical deficits which impact ability to perform ADLs (bathing, care of mouth/teeth, toileting, grooming, dressing, etc )  - Assess/evaluate cause of self-care deficits   - Assess range of motion  - Assess patient's mobility; develop plan if impaired  - Assess patient's need for assistive devices and provide as appropriate  - Encourage maximum independence but intervene and supervise when necessary  - Involve family in performance of ADLs  - Assess for home care needs following discharge   - Consider OT consult to assist with ADL evaluation and planning for discharge  - Provide patient education as appropriate  Outcome: Progressing     Problem: Potential for Falls  Goal: Patient will remain free of falls  Description: INTERVENTIONS:  - Educate patient/family on patient safety including physical limitations  - Instruct patient to call for assistance with activity   - Consult OT/PT to assist with strengthening/mobility   - Keep Call bell within reach  - Keep bed low and locked with side rails adjusted as appropriate  - Keep care items and personal belongings within reach  - Initiate and maintain comfort rounds  - Make Fall Risk Sign visible to staff  - Offer Toileting every 2 Hours, in advance of need  - Initiate/Maintain bed alarm  - Apply yellow socks and bracelet for high fall risk patients  - Consider moving patient to room near nurses station  Outcome: Progressing     Problem: PAIN - ADULT  Goal: Verbalizes/displays adequate comfort level or baseline comfort level  Description: Interventions:  - Encourage patient to monitor pain and request assistance  - Assess pain using appropriate pain scale  - Administer analgesics based on type and severity of pain and evaluate response  - Implement non-pharmacological measures as appropriate and evaluate response  - Consider cultural and social influences on pain and pain management  - Notify physician/advanced practitioner if interventions unsuccessful or patient reports new pain  Outcome: Progressing     Problem: INFECTION - ADULT  Goal: Absence or prevention of progression during hospitalization  Description: INTERVENTIONS:  - Assess and monitor for signs and symptoms of infection  - Monitor lab/diagnostic results  - Monitor all insertion sites, i e  indwelling lines, tubes, and drains  - Monitor endotracheal if appropriate and nasal secretions for changes in amount and color  - [place] appropriate cooling/warming therapies per order  - Administer medications as ordered  - Instruct and encourage patient and family to use good hand hygiene technique  - Identify and instruct in appropriate isolation precautions for identified infection/condition  Outcome: Progressing     Problem: SAFETY ADULT  Goal: Maintain or return to baseline ADL function  Description: INTERVENTIONS:  -  Assess patient's ability to carry out ADLs; assess patient's baseline for ADL function and identify physical deficits which impact ability to perform ADLs (bathing, care of mouth/teeth, toileting, grooming, dressing, etc )  - Assess/evaluate cause of self-care deficits   - Assess range of motion  - Assess patient's mobility; develop plan if impaired  - Assess patient's need for assistive devices and provide as appropriate  - Encourage maximum independence but intervene and supervise when necessary  - Involve family in performance of ADLs  - Assess for home care needs following discharge   - Consider OT consult to assist with ADL evaluation and planning for discharge  - Provide patient education as appropriate  Outcome: Progressing  Goal: Patient will remain free of falls  Description: INTERVENTIONS:  - Educate patient/family on patient safety including physical limitations  - Instruct patient to call for assistance with activity   - Consult OT/PT to assist with strengthening/mobility   - Keep Call bell within reach  - Keep bed low and locked with side rails adjusted as appropriate  - Keep care items and personal belongings within reach  - Initiate and maintain comfort rounds  - Make Fall Risk Sign visible to staff  - Offer Toileting every 2 Hours, in advance of need  - Initiate/Maintain bed alarm  - Apply yellow socks and bracelet for high fall risk patients  - Consider moving patient to room near nurses station  Outcome: Progressing     Problem: GENITOURINARY - ADULT  Goal: Maintains or returns to baseline urinary function  Description: INTERVENTIONS:  - Assess urinary function  - Encourage oral fluids to ensure adequate hydration if ordered  - Administer IV fluids as ordered to ensure adequate hydration  - Administer ordered medications as needed  - Offer frequent toileting  - Follow urinary retention protocol if ordered  Outcome: Progressing  Goal: Absence of urinary retention  Description: INTERVENTIONS:  - Assess patient’s ability to void and empty bladder  - Monitor I/O  - Bladder scan as needed  - Discuss with physician/AP medications to alleviate retention as needed  - Discuss catheterization for long term situations as appropriate  Outcome: Progressing  Goal: Urinary catheter remains patent  Description: INTERVENTIONS:  - Assess patency of urinary catheter  - If patient has a chronic wagner, consider changing catheter if non-functioning  - Follow guidelines for intermittent irrigation of non-functioning urinary catheter  Outcome: Progressing     Problem: HEMATOLOGIC - ADULT  Goal: Maintains hematologic stability  Description: INTERVENTIONS  - Assess for signs and symptoms of bleeding or hemorrhage  - Monitor labs  - Administer supportive blood products/factors as ordered and appropriate  Outcome: Progressing     Problem: MUSCULOSKELETAL - ADULT  Goal: Maintain or return mobility to safest level of function  Description: INTERVENTIONS:  - Assess patient's ability to carry out ADLs; assess patient's baseline for ADL function and identify physical deficits which impact ability to perform ADLs (bathing, care of mouth/teeth, toileting, grooming, dressing, etc )  - Assess/evaluate cause of self-care deficits   - Assess range of motion  - Assess patient's mobility  - Assess patient's need for assistive devices and provide as appropriate  - Encourage maximum independence but intervene and supervise when necessary  - Involve family in performance of ADLs  - Assess for home care needs following discharge   - Consider OT consult to assist with ADL evaluation and planning for discharge  - Provide patient education as appropriate  Outcome: Progressing

## 2023-01-09 NOTE — ASSESSMENT & PLAN NOTE
· BNP >2000   EF 60-65%, normal wall motion  · CT small stable left pleural effusion with drainage catheter in place  · Continue lasix 20 mg daily with hold parameters  · Monitor daily weight

## 2023-01-09 NOTE — OCCUPATIONAL THERAPY NOTE
Occupational Therapy Evaluation     Patient Name: Elle Caballero  Today's Date: 1/9/2023  Problem List  Principal Problem:    Bacteremia due to Enterobacter species  Active Problems:    Coronary artery disease of native artery of native heart with stable angina pectoris ([facility])    Type 2 diabetes mellitus, with long-term current use of insulin (HCC)    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)    Gross hematuria    Continuous opioid dependence (HCC)    Stage 4 chronic kidney disease ([facility])    Chronic pleural effusion    Symptomatic hypotension    Influenza A    Sinus tachycardia    Elevated brain natriuretic peptide (BNP) level    Nephrostomy status ([facility])    Acute on chronic blood loss anemia    Past Medical History  Past Medical History:   Diagnosis Date    Anemia     Last assessed: 9/28/17    Anxiety     Arteriosclerotic cardiovascular disease     Last assessed: 9/28/17    Arthritis     Bladder cancer ([address] )     bladder- had BCG treatments    Chronic kidney disease     Stage IV    CKD (chronic kidney disease) stage 4, GFR 15-29 ml/min ([facility])     Colon polyp     Coronary artery disease     7 stents    Depression     Diabetes mellitus ([facility])     IDDM    GERD (gastroesophageal reflux disease)     Glaucoma     Hematuria     History of fusion of cervical spine     Hyperlipidemia     Hypertension     Insomnia     Last assessed: 11/14/12    Loss of hearing     has hearing aids but usually does not wear them    Lung cancer ([address] )     Metastatic cancer ([address] )     Other seasonal allergic rhinitis     Last assessed: 2/10/16    PAD (peripheral artery disease) ([facility])     Shortness of breath     on exertion    Spinal stenosis of lumbar region     Transient cerebral ischemia     No Residual    Uses walker     w/c for longer distances     Past Surgical History  Past Surgical History:   Procedure Laterality Date    CARDIAC SURGERY      Cath stent placement  Last assessed: 3/9/17   Interventional Catheterization CHOLECYSTECTOMY      COLONOSCOPY      CYSTOSCOPY      Diagnostic w/biopsy  Bao Jessi  Last assessed: 12/1/14    CYSTOSCOPY N/A 04/12/2022    Procedure: CYSTOSCOPY  Bladder biopsies  ;  Surgeon: Alba Garzon MD;  [facility];  Service: Urology    CYSTOSCOPY W/ RETROGRADES Right 03/01/2022    Procedure: CYSTO; stent removal retrograde;  Surgeon: Alba Garzon MD;  [facility];  Service: Urology    CYSTOSCOPY W/ URETERAL STENT PLACEMENT Bilateral 10/18/2021    Procedure: bilateral retrogrades, cytology collection;  Surgeon: Alba Garzon MD;  [facility];  Service: Urology    CYSTOURETHROSCOPY      w/cautery  Bao Jessi    FL RETROGRADE PYELOGRAM  10/18/2021    FL RETROGRADE PYELOGRAM  10/24/2021    FL RETROGRADE PYELOGRAM  12/14/2022    GASTRIC BYPASS      For morbid obesity w/Shaji-en-Y   Resolved: 11/17/09    INCISION AND DRAINAGE OF WOUND Right 02/26/2017    Procedure: INCISION AND DRAINAGE (I&D) EXTREMITY WITH APPLICATION OF GRAFT JACKET;  Surgeon: Huyen Guerrero DPM;  [facility];  Service:     INCISION AND DRAINAGE OF WOUND Right 04/25/2017    Procedure: INCISION AND DRAINAGE (I&D) EXTREMITY, APPLICATION OF GRAFT;  Surgeon: Huyen Gurerero DPM;  [facility];  Service:     IR BIOPSY OTHER  07/02/2020    IR LOWER EXTREMITY ANGIOGRAM  02/08/2021    IR LOWER EXTREMITY ANGIOGRAM  02/11/2021    IR NEPHROSTOMY TUBE CHECK/CHANGE/REPOSITION/REINSERTION/UPSIZE  04/28/2022    IR NEPHROSTOMY TUBE CHECK/CHANGE/REPOSITION/REINSERTION/UPSIZE  05/24/2022    IR NEPHROSTOMY TUBE CHECK/CHANGE/REPOSITION/REINSERTION/UPSIZE  06/07/2022    IR NEPHROSTOMY TUBE CHECK/CHANGE/REPOSITION/REINSERTION/UPSIZE  07/28/2022    IR NEPHROSTOMY TUBE CHECK/CHANGE/REPOSITION/REINSERTION/UPSIZE  11/15/2022    IR NEPHROSTOMY TUBE PLACEMENT  02/25/2022    IR PORT PLACEMENT  01/17/2022    IR THORACENTESIS  09/02/2022    IR THORACENTESIS  09/14/2022    IR THORACENTESIS WITH TUBE PLACEMENT Left     october    IR TUNNELED CENTRAL LINE PLACEMENT  12/24/2020    JOINT REPLACEMENT      christofer knees replaced    WY AMPUTATION METATARSAL W/TOE SINGLE Left 12/21/2020    Procedure: RAY RESECTION FOOT;  Surgeon: Disha Almanza DPM;  [facility];  Service: Podiatry    WY AMPUTATION METATARSAL W/TOE SINGLE Left 12/31/2020    Procedure: 5TH MET RESECTION;  Surgeon: Disha Almanza DPM;  [facility];  Service: Podiatry    WY CYSTO BLADDER W/URETERAL CATHETERIZATION Right 12/14/2022    Procedure: CYSTOSCOPY WITH RETROGRADE PYELOGRAM and CLOT EVACUATION, RIGHT STENT INSERTION, FULGRATION OF BLEEDING POINTS;  Surgeon: Jacy Stiles MD;  [facility];  Service: Urology    WY CYSTO W/IRRIG & EVAC MULTPLE OBSTRUCTING CLOTS N/A 02/10/2021    Procedure: CYSTOSCOPY EVACUATION OF CLOTS, fulguration;  Surgeon: Ti Jackson MD;  [facility];  Service: Urology    WY CYSTO W/IRRIG & EVAC MULTPLE OBSTRUCTING CLOTS N/A 10/24/2021    Procedure: CYSTOSCOPY EVACUATION OF CLOT, fulguration of bleeding vessels, right ureter stent placement, retrograde pyelogram;  Surgeon: Elliott Urrutia MD;  [facility];  Service: Urology    WY CYSTO W/REMOVAL OF LESIONS SMALL N/A 11/19/2020    Procedure: CYSTO W/BIOPSIES, transurethral prostate bx;  Surgeon: Mariela Villegas MD;  [facility];  Service: Urology    WY CYSTOURETHROSCOPY W/DEST &/RMVL TUMOR LARGE Bilateral 10/18/2021    Procedure: TRANSURETHRAL RESECTION OF BLADDER TUMOR (TURBT);   Surgeon: Jazmin Vazquez MD;  [facility];  Service: Urology    WY CYSTOURETHROSCOPY WITH BIOPSY N/A 08/16/2016    Procedure: Gerhardt Erp;  Surgeon: Karen Ding MD;  [facility];  Service: Urology    WY Richa Wright 3RD+ ORD [address] PEL/LXTR [facility] Left 02/08/2021    Procedure: LEG angiogram, CO2 w/limited contrast with balloon angioplasty postertior tibial artery;  Surgeon: Shona Wing MD;  [facility]; Service: Vascular    ROTATOR CUFF REPAIR      SMALL INTESTINE SURGERY      Surgery Shaji-en-Y    SPINAL FUSION      lumbar and cervical fusions    VAC DRESSING APPLICATION Right 15/41/7358    Procedure: APPLICATION VAC DRESSING;  Surgeon: Latisha Ross DPM;  [facility];  Service:     WOUND DEBRIDEMENT Left 02/16/2021    Procedure: FOOT DEBRIDE, [address] OUT w/graft application;  Surgeon: Latisha Ross DPM;  [facility];  Service: Podiatry           01/09/23 0914   OT Last Visit   OT Visit Date 01/09/23   Note Type   Note type Evaluation   Pain Assessment   Pain Assessment Tool 0-10   Pain Score 8   Pain Location/Orientation Location: Groin   Patient's Stated Pain Goal No pain   Hospital Pain Intervention(s) Repositioned; Ambulation/increased activity; Emotional support; Rest   Multiple Pain Sites No   Restrictions/Precautions   Weight Bearing Precautions Per Order No   Braces or Orthoses Other (Comment)  (Noted w/ R wrist drop during recent admission at [facility], recommended R resting hand splint per neuro )   Other Precautions Contact/isolation;Cognitive; Chair Alarm; Bed Alarm; Fall Risk;Pain;Multiple lines;Limb alert  (Limb alert R UE  B/L nephrostomy tubes  on CBI)   Home Living   Type of [address] [place] [address] Multi-level;1/2 bath on main level;Bed/bath upstairs  (Previous notes indicate ramped entrance  Pt reports i ADILENE via garage vs 3 ADILENE in front)   Bathroom Shower/Tub Walk-in shower   Bathroom Toilet Raised   Bathroom Equipment Shower chair  (Previous notes indicate grab bars in shower and pt owning BSC, pt declines both in eval)   [address] Walker;Cane;Stair glide  (Rollator, RW, Hurrycane)   Additional Comments Pt presented from [facility] for rehab (admitted on 12/22/22 following admission at [facility])  Prior to rehab, pt lives with spouse and sister-in-law in a multi-level house with 1 vs 3 ADILENE and stair glide to 2nd floor  (-) home alone     Prior Function Level of [place] Independent with ADLs; Independent with functional mobility; Needs assistance with IADLS   Lives With Spouse; Other (Comment)  (Sister-in-law)   Receives Help From Family   IADLs Family/Friend/Other provides transportation; Family/Friend/Other provides meals; Family/Friend/Other provides medication management   Falls in the last 6 months 1 to 4  (4)   Vocational Retired   Lifestyle   Autonomy At baseline, pt was I w/ ADLs and functional transfers/mobility w/ use of Rollator  Family performs IADLs  (-)   +Falls PTA  Reciprocal Relationships Spouse, sister-in-law   Service to Others Retired-    Intrinsic Gratification Watching TV   ADL   Where [phone]  Supervision/Setup   Grooming Assistance 4  Minimal Assistance   [id number]  [address] 3  Moderate Assistance   [address] 4  [address] 2  Maximal [address]  3  Moderate [address] 3  Moderate Assistance   Bed Mobility   Supine to Sit 3  Moderate assistance   Additional items Assist x 2;HOB elevated; Bedrails; Increased time required;Verbal cues;LE management  (increased time to complete 2* groin pain)   Sit to Supine Unable to assess   Additional Comments Pt seated OOB in chair with chair alarm activated at end of session  Call bell and phone within reach  All needs met and pt reports no further questions for OT at this time  Transfers   Sit to Stand 3  Moderate assistance   Additional items Assist x 2;Bedrails; Increased time required;Verbal cues   Stand to Sit 3  Moderate assistance   Additional items Assist x 2; Increased time required;Verbal cues;Armrests  (decreased controlled descent)   Additional Comments Cues for safe technique and hand placement  Increased time to complete sit>stand transition   Decreased controlled descent for stand>sit   Functional Mobility   Functional Mobility 4  Minimal assistance   Additional Comments Assist x1-2  Pt initially required Min A of 2, progressing to Min A of 1  Assist for line management   Additional items Rolling walker   Balance   Static Sitting Fair   Dynamic Sitting Fair -   Static Standing Fair -   Dynamic Standing Poor +   Ambulatory Poor +   Activity Tolerance   Activity Tolerance Patient limited by pain   Medical Staff Made Aware CAMILLE Peoples   Nurse Made Aware yes; Carmen Garcia RN   RUE Assessment   RUE Assessment X  (+R wrist drop   strength: 3/5  Elbow flex/ext: 3+/5, Limited shldr ROM  3-/5)   LUE Assessment   LUE Assessment WFL   LUE Strength   LUE Overall Strength Within Functional Limits - able to perform ADL tasks with strength  (4-/5 throughout; L hand dominant)   Sensation   Light Touch No apparent deficits   Proprioception   Proprioception No apparent deficits   Vision-Basic Assessment   Current Vision Wears glasses only for reading   Vision - Complex Assessment   Ocular Range of Motion Intact   Acuity Able to read clock/calendar on wall without difficulty   Psychosocial   Psychosocial (WDL) WDL   Perception   Inattention/Neglect Appears intact   Cognition   Overall Cognitive Status Impaired   Arousal/Participation Alert; Cooperative   Attention Attends with cues to redirect   Orientation Level Oriented to person;Oriented to place;Oriented to time   Memory Decreased recall of precautions;Decreased recall of recent events   Following Commands Follows one step commands with increased time or repetition   Assessment   Limitation Decreased ADL status; Decreased UE strength;Decreased Safe judgement during ADL;Decreased endurance;Decreased self-care trans;Decreased high-level ADLs; Decreased fine motor control   Prognosis Good   Assessment Pt is a 76 y o  male seen for OT evaluation s/p adm to [facility]  on 1/7/2023 as a transfer from [facility] w/ Bacteremia due to Enterobacter species, Acute on chronic blood loss anemia, Gross hematuria, elevated BNP level, and Symptomatic hypotension  Comorbidities affecting pt’s functional performance include a significant PMH of Anemia, Anxiety, Arthritis, Bladder CA, CKD, CAD, Depression, DM, Glaucoma, Hamturia, HLD, HTN, Lung CA, PAD, Spinal stenosis  Pt with active OT orders and activity orders for Up with assistance  Pt presented from [facility] [address] for rehab (admitted on 22 following admission at [facility])  Prior to rehab, pt lives with spouse and sister-in-law in a multi-level house with 1 vs 3 ADILENE and stair glide to 2nd floor  (-) home alone  At baseline, pt was I w/ ADLs and functional transfers/mobility w/ use of Rollator  Family performs IADLs  (-)   +Falls PTA  Upon evaluation, pt currently requires Min A for UB ADLs, Mod-Max A for LB ADLs, Mod A for toileting, Mod A of 2 for bed mobility, Mod A of 2 for transfers, and Min A of 1-2 for functional mobility 2* the following deficits impacting occupational performance: decreased ROM, decreased strength, decreased balance, decreased tolerance, impaired [address], impaired problem solving, decreased safety awareness and increased pain  These impairments, as well at pt’s fall risk, multiple lines, steps to enter environment, difficulty performing ADLS and limited insight into deficits limit pt’s ability to safely engage in all baseline areas of occupation  Pt to continue to benefit from continued acute OT services during hospital stay to address defined deficits and to maximize level of functional independence in the following Occupational Performance areas: grooming, bathing/shower, toilet hygiene, dressing, health maintenance, functional mobility, community mobility and clothing management  From OT standpoint, recommend STR upon D/C   OT will continue to follow pt 3-5x/wk to address the following goals to  w/in 10-14 days:   Goals   Patient Goals To have less pain   LTG Time Frame 10-14   Long Term Goal Please refer to LTGs listed below   Plan   Treatment Interventions ADL retraining;Functional transfer training;UE strengthening/ROM; Endurance training;Patient/family training;Equipment evaluation/education; Compensatory technique education;Continued evaluation; Activityengagement   Goal Expiration Date 01/23/23   OT Treatment Day 0   OT Frequency 3-5x/wk   Recommendation   OT Discharge Recommendation Post acute rehabilitation services   Additional Comments  The patient's raw score on the AM-PAC Daily Activity inpatient short form is 15, standardized score is 34 69, less than 39 4  Patients at this level are likely to benefit from discharge to post-acute rehabilitation services  Please refer to the recommendation of the Occupational Therapist for safe discharge planning     AM-PAC Daily Activity Inpatient   Lower Body Dressing 2   Bathing 2   Toileting 2   Upper Body Dressing 3   Grooming 3   Eating 3   Daily Activity Raw Score 15   Daily Activity Standardized Score (Calc for Raw Score >=11) 34 69   AM-PAC Applied Cognition Inpatient   Following a Speech/Presentation 4   Understanding Ordinary Conversation 4   Taking Medications 3   Remembering Where Things Are Placed or Put Away 3   Remembering List of 4-5 Errands 3   Taking Care of Complicated Tasks 2   Applied Cognition Raw Score 19   Applied Cognition Standardized Score 39 77        GOALS    Pt will improve activity tolerance to G for min 30 min txment sessions for increase engagement in functional tasks    Pt will complete bed mobility at a Mod I level w/ G balance/safety demonstrated to decrease caregiver assistance required     Pt will complete UB dressing/self care w/ mod I using adaptive device and DME as needed     Pt will complete LB dressing/self care w/ mod I using adaptive device and DME as needed    Pt will complete toileting w/ mod I w/ G hygiene/thoroughness using DME as needed    Pt will improve functional transfers to Mod I on/off all surfaces using DME as needed w/ G balance/safety     Pt will improve functional mobility during ADL/IADL/leisure tasks to Mod I using DME as needed w/ G balance/safety     Pt will be attentive 100% of the time during ongoing cognitive assessment w/ G participation to assist w/ safe d/c planning/recommendations    Pt will demonstrate G carryover of pt/caregiver education and training as appropriate w/o cues w/ good tolerance to increase safety during functional tasks    Pt will increase BUE strength by 1MM grade via AROM/AAROM/PROM exercises to increase independence in ADLs and transfers    Pt will verbalize 3 potential fall hazards and identify appropriate compensatory techniques to decrease fall risk in home environment     Pt will increase standing tolerance to 8-10 mins with Fair+ dynamic standing balance to increase safety during participation in ADLs       Kitty Rae, OTR/L

## 2023-01-09 NOTE — PROGRESS NOTES
Progress Note - Urology  [facility] 1945, 76 y o  male MRN: [id number]    Unit/Bed#: E5 -01 Encounter: [id number]      Assessment & Plans  -High grade treatment refractory bladder cancer from 1995 to current, several intravesical therapies in those decades, now s/p chemoradiation 2021; malignant bilateral ureteral obstruction managed with bilateral nephrostomy tubes in addition to right ureteral stent for upper tract bleeding tamponade attempt new stent from 12/14/22  On immunotherapy managed by medical oncology  Probable IR visit for b/l PCN exchanges this hospitalization, while on antibiotics; last exchange 11/15/22     -Symptomatic anemia with near syncope- resolved s/p blood transfusion 1/2/23; BP 120s/80s today no dizziness  Hgb 7 3    -Enterobacter cloacae bacteremia- continue medical optimization, symptom management, and antibiotics  Current sensitivities suggest susceptibility to cefepime     -Gross hematuria- much improved; both nephrostomy tubes are draining clear magnolia/peach urine today  Urethral catheter patent for light pink efflux on moderate CBI  Hand irrigated one clot/debris with improved spasm and drainage  CBI remains at 50% at this time with continued need  Will continue and assess again in several hours  Continue q4h hand irrigation or sooner if needed for tube patency  Continue home rx ditropan 5mg TID for bladder spasm  -Influenza A positive- per primary medical team      Subjective: bladder penis spasms pain same  Urine in PCNs is clearer  No fevers  His back is very itchy  Review of Systems   Constitutional: Negative  Respiratory: Negative  Cardiovascular: Negative  Genitourinary: Positive for hematuria and penile pain  Negative for decreased urine volume, difficulty urinating, dysuria, flank pain, frequency, testicular pain and urgency  Musculoskeletal: Positive for back pain     Skin:        itchy       Objective:  Vitals: Blood pressure 119/64, pulse 70, temperature 98 1 °F (36 7 °C), temperature source Oral, resp  rate 18, weight 90 kg (198 lb 6 6 oz), SpO2 97 %  ,Body mass index is 24 15 kg/m²  Intake/Output Summary (Last 24 hours) at 1/9/2023 1120  Last data filed at 1/9/2023 1013  Gross per 24 hour   Intake 990 ml   Output 47541 ml   Net -49701 ml     Invasive Devices     Central Venous Catheter Line  Duration           Port A Cath Right Chest -- days          Peripheral Intravenous Line  Duration           Peripheral IV 01/02/23 Left Antecubital 6 days    Peripheral IV 01/07/23 Left Antecubital 1 day          Drain  Duration           Nephrostomy Left 10 2 Fr  54 days    Nephrostomy Right 10 2 Fr  54 days    Urethral Catheter Three way 20 Fr  1 day                Physical Exam  Vitals and nursing note reviewed  Constitutional:       Appearance: He is well-developed  Comments: Black male seated in joo chair awake active conversive   HENT:      Head: Normocephalic and atraumatic  Cardiovascular:      Rate and Rhythm: Normal rate and regular rhythm  Heart sounds: Normal heart sounds  No murmur heard  Pulmonary:      Effort: Pulmonary effort is normal       Breath sounds: Normal breath sounds  Abdominal:      General: Bowel sounds are normal       Palpations: Abdomen is soft  Tenderness: There is no abdominal tenderness  There is no right CVA tenderness or left CVA tenderness  Genitourinary:     Comments: Uncircumcised penis normal phallus catheter per urethra draining clear/colorless to peach efflux on moderate cbi  Hand irrigationi produces 1 clot/debris with improved flow of light pink urine to gravity  Groin, scrotum, testes otherwise normal   Musculoskeletal:         General: Normal range of motion  Skin:     General: Skin is warm and dry  Capillary Refill: Capillary refill takes less than 2 seconds  Coloration: Skin is not pale  Neurological:      Mental Status: He is alert and oriented to person, place, and time  Labs:  Recent Labs     01/07/23  1114 01/07/23  1613 01/07/23  1907 01/08/23  0539 01/09/23  0514   WBC 9 79 9 17 9 78 7 39 9 47     Recent Labs     01/07/23  1114 01/07/23  1613 01/07/23  1907 01/08/23  0539 01/09/23  0514   HGB 7 5* 8 3* 7 1* 7 7* 7 3*       Recent Labs     01/07/23  1114 01/07/23  1613 01/08/23  0539 01/09/23  0514   CREATININE 3 21* 3 21* 3 18* 3 11*       History:    Past Medical History:   Diagnosis Date   • Anemia     Last assessed: 9/28/17   • Anxiety    • Arteriosclerotic cardiovascular disease     Last assessed: 9/28/17   • Arthritis    • Bladder cancer ([facility] )     bladder- had BCG treatments   • Chronic kidney disease     Stage IV   • CKD (chronic kidney disease) stage 4, GFR 15-29 ml/min ([facility])    • Colon polyp    • Coronary artery disease     7 stents   • Depression    • Diabetes mellitus ([facility])     IDDM   • GERD (gastroesophageal reflux disease)    • Glaucoma    • Hematuria    • History of fusion of cervical spine    • Hyperlipidemia    • Hypertension    • Insomnia     Last assessed: 11/14/12   • Loss of hearing     has hearing aids but usually does not wear them   • [facility] cancer ([facility] )    • Metastatic cancer ([facility] )    • Other seasonal allergic rhinitis     Last assessed: 2/10/16   • PAD (peripheral artery disease) ([facility])    • Shortness of breath     on exertion   • Spinal stenosis of lumbar region    • Transient cerebral ischemia     No Residual   • Uses walker     w/c for longer distances     Past Surgical History:   Procedure Laterality Date   • CARDIAC SURGERY      Cath stent placement  Last assessed: 3/9/17  Interventional Catheterization   • CHOLECYSTECTOMY     • COLONOSCOPY     • CYSTOSCOPY      Diagnostic w/biopsy  Estephanie Thompson  Last assessed: 12/1/14   • CYSTOSCOPY N/A 04/12/2022    Procedure: CYSTOSCOPY  Bladder biopsies  ;  Surgeon: Jayden Turner MD;  [facility];  Service: Urology   • CYSTOSCOPY W/ RETROGRADES Right 03/01/2022    Procedure: CYSTO; stent removal retrograde;  Surgeon: Temi Mendoza MD;  [facility];  Service: Urology   • CYSTOSCOPY W/ URETERAL STENT PLACEMENT Bilateral 10/18/2021    Procedure: bilateral retrogrades, cytology collection;  Surgeon: Temi Mendoza MD;  [facility];  Service: Urology   • CYSTOURETHROSCOPY      w/cautery  Jorge Gómez   • FL RETROGRADE PYELOGRAM  10/18/2021   • FL RETROGRADE PYELOGRAM  10/24/2021   • FL RETROGRADE PYELOGRAM  12/14/2022   • GASTRIC BYPASS      For morbid obesity w/Shaji-en-Y   Resolved: 11/17/09   • INCISION AND DRAINAGE OF WOUND Right 02/26/2017    Procedure: INCISION AND DRAINAGE (I&D) EXTREMITY WITH APPLICATION OF GRAFT JACKET;  Surgeon: Padma Salgado DPM;  [facility];  Service:    • INCISION AND DRAINAGE OF WOUND Right 04/25/2017    Procedure: INCISION AND DRAINAGE (I&D) EXTREMITY, APPLICATION OF GRAFT;  Surgeon: Padma Salgado DPM;  [facility];  Service:    • IR BIOPSY OTHER  07/02/2020   • IR LOWER EXTREMITY ANGIOGRAM  02/08/2021   • IR LOWER EXTREMITY ANGIOGRAM  02/11/2021   • IR NEPHROSTOMY TUBE CHECK/CHANGE/REPOSITION/REINSERTION/UPSIZE  04/28/2022   • IR NEPHROSTOMY TUBE CHECK/CHANGE/REPOSITION/REINSERTION/UPSIZE  05/24/2022   • IR NEPHROSTOMY TUBE CHECK/CHANGE/REPOSITION/REINSERTION/UPSIZE  06/07/2022   • IR NEPHROSTOMY TUBE CHECK/CHANGE/REPOSITION/REINSERTION/UPSIZE  07/28/2022   • IR NEPHROSTOMY TUBE CHECK/CHANGE/REPOSITION/REINSERTION/UPSIZE  11/15/2022   • IR NEPHROSTOMY TUBE PLACEMENT  02/25/2022   • IR PORT PLACEMENT  01/17/2022   • IR THORACENTESIS  09/02/2022   • IR THORACENTESIS  09/14/2022   • IR THORACENTESIS WITH TUBE PLACEMENT Left     october   • IR TUNNELED CENTRAL LINE PLACEMENT  12/24/2020   • JOINT REPLACEMENT      christofer knees replaced   • KS AMPUTATION METATARSAL W/TOE SINGLE Left 12/21/2020    Procedure: RAY RESECTION FOOT;  Surgeon: Isabel Ramires DPM;  [facility];  Service: Podiatry   • KS AMPUTATION METATARSAL W/TOE SINGLE Left 12/31/2020    Procedure: 5TH MET RESECTION;  Surgeon: Brittney Seals DPM;  [facility];  Service: Podiatry   • HI CYSTO BLADDER W/URETERAL CATHETERIZATION Right 12/14/2022    Procedure: CYSTOSCOPY WITH RETROGRADE PYELOGRAM and CLOT EVACUATION, RIGHT STENT INSERTION, FULGRATION OF BLEEDING POINTS;  Surgeon: Lexi Magana MD;  [facility];  Service: Urology   • HI CYSTO W/IRRIG & EVAC MULTPLE OBSTRUCTING CLOTS N/A 02/10/2021    Procedure: CYSTOSCOPY EVACUATION OF CLOTS, fulguration;  Surgeon: Lacy Mora MD;  [facility];  Service: Urology   • HI CYSTO W/IRRIG & EVAC MULTPLE OBSTRUCTING CLOTS N/A 10/24/2021    Procedure: CYSTOSCOPY EVACUATION OF CLOT, fulguration of bleeding vessels, right ureter stent placement, retrograde pyelogram;  Surgeon: Cindy Liriano MD;  [facility];  Service: Urology   • HI CYSTO W/REMOVAL OF LESIONS SMALL N/A 11/19/2020    Procedure: CYSTO W/BIOPSIES, transurethral prostate bx;  Surgeon: Anu Kruger MD;  [facility];  Service: Urology   • HI CYSTOURETHROSCOPY W/DEST &/RMVL TUMOR LARGE Bilateral 10/18/2021    Procedure: TRANSURETHRAL RESECTION OF BLADDER TUMOR (TURBT);   Surgeon: Mary Beth Shah MD;  [facility];  Service: Urology   • HI CYSTOURETHROSCOPY WITH BIOPSY N/A 08/16/2016    Procedure: Madeleine Guerrero;  Surgeon: Bhumika Anderson MD;  [facility];  Service: Urology   • HI Hansel Marin 3RD+ ORD [address] PEL/[facility] Left 02/08/2021    Procedure: LEG angiogram, CO2 w/limited contrast with balloon angioplasty postertior tibial artery;  Surgeon: Kristan Guillaume MD;  [facility];  Service: Vascular   • ROTATOR CUFF REPAIR     • SMALL INTESTINE SURGERY      Surgery Shaji-en-Y   • SPINAL FUSION      lumbar and cervical fusions   • VAC DRESSING APPLICATION Right 79/95/8027    Procedure: APPLICATION VAC DRESSING;  Surgeon: Susette Severin, DPM;  [facility];  Service:    • WOUND DEBRIDEMENT Left 2021    Procedure: FOOT DEBRIDE, [address] OUT w/graft application;  Surgeon: Livia Anguiano DPM;  [facility];  Service: Podiatry     Family History   Problem Relation Age of Onset   • Diabetes Mother    • Heart disease Mother    • Other Mother         High blood pressure   • Heart disease Father    • Diabetes Sister    • Other Sister         High blood pressure   • Kidney disease Sister    • Heart disease Brother    • Other Brother         High blood pressure     Social History     Socioeconomic History   • Marital status: /Civil Union     Spouse name: Not on file   • Number of children: Not on file   • Years of education: Not on file   • Highest education level: Not on file   Occupational History   • Not on file   Tobacco Use   • Smoking status: Former     Packs/day: 3 00     Years: 27 00     Pack years: 81 00     Types: Cigarettes     Quit date: 1970     Years since quittin 0   • Smokeless tobacco: Never   Vaping Use   • Vaping Use: Never used   Substance and Sexual Activity   • Alcohol use: Never     Comment: beer / liquor   • Drug use: Not Currently     Types: Marijuana     Comment: quit 2019 had medical marijuana   • Sexual activity: Not Currently   Other Topics Concern   • Not on file   Social History Narrative    Consumes 1 cup of coffee and 1 soda per day     Social Determinants of Health     Financial Resource Strain: Not on file   Food Insecurity: No Food Insecurity   • Worried About Running Out of Food in the Last Year: Never true   • Ran Out of Food in the Last Year: Never true   Transportation Needs: No Transportation Needs   • Lack of Transportation (Medical): No   • Lack of Transportation (Non-Medical):  No   Physical Activity: Not on file   Stress: Not on file   Social Connections: Not on file   Intimate Partner Violence: Not on file   Housing Stability: Low Risk    • Unable to Pay for Housing in the Last Year: No   • Number of Places Lived in the Last Year: 1   • Unstable Housing in the Last Year: [place]  Date: 1/9/2023 Time: 11:20 AM

## 2023-01-09 NOTE — PHYSICAL THERAPY NOTE
PHYSICAL THERAPY EVALUATION          Patient Name: Maycol Renee  Today's Date: 1/9/2023  PT EVALUATION    76 y o     [id number]    UTI (urinary tract infection) [N39 0]    Past Medical History:   Diagnosis Date    Anemia     Last assessed: 9/28/17    Anxiety     Arteriosclerotic cardiovascular disease     Last assessed: 9/28/17    Arthritis     Bladder cancer ([facility] )     bladder- had BCG treatments    Chronic kidney disease     Stage IV    CKD (chronic kidney disease) stage 4, GFR 15-29 ml/min ([facility])     Colon polyp     Coronary artery disease     7 stents    Depression     Diabetes mellitus ([facility])     IDDM    GERD (gastroesophageal reflux disease)     Glaucoma     Hematuria     History of fusion of cervical spine     Hyperlipidemia     Hypertension     Insomnia     Last assessed: 11/14/12    Loss of hearing     has hearing aids but usually does not wear them    Lung cancer ([facility] )     Metastatic cancer ([facility] )     Other seasonal allergic rhinitis     Last assessed: 2/10/16    PAD (peripheral artery disease) ([facility])     Shortness of breath     on exertion    Spinal stenosis of lumbar region     Transient cerebral ischemia     No Residual    Uses walker     w/c for longer distances         Past Surgical History:   Procedure Laterality Date    CARDIAC SURGERY      Cath stent placement  Last assessed: 3/9/17  Interventional Catheterization    CHOLECYSTECTOMY      COLONOSCOPY      CYSTOSCOPY      Diagnostic w/biopsy  Sanjana Domínguez  Last assessed: 12/1/14    CYSTOSCOPY N/A 04/12/2022    Procedure: CYSTOSCOPY  Bladder biopsies  ;  Surgeon: Aishwarya Kerr MD;  [facility];  Service: Urology    CYSTOSCOPY W/ RETROGRADES Right 03/01/2022    Procedure: CYSTO; stent removal retrograde;  Surgeon: Aishwarya Kerr MD;  [facility];  Service: Urology    CYSTOSCOPY W/ URETERAL STENT PLACEMENT Bilateral 10/18/2021    Procedure: bilateral retrogrades, cytology collection;  Surgeon: Sushant Pop Joycelyn Carlos MD;  [facility];  Service: Urology    CYSTOURETHROSCOPY      w/cautery  Mirela Guadarrama    FL RETROGRADE PYELOGRAM  10/18/2021    FL RETROGRADE PYELOGRAM  10/24/2021    FL RETROGRADE PYELOGRAM  12/14/2022    GASTRIC BYPASS      For morbid obesity w/Shaji-en-Y   Resolved: 11/17/09    INCISION AND DRAINAGE OF WOUND Right 02/26/2017    Procedure: INCISION AND DRAINAGE (I&D) EXTREMITY WITH APPLICATION OF GRAFT JACKET;  Surgeon: Farooq Sears DPM;  [facility];  Service:     INCISION AND DRAINAGE OF WOUND Right 04/25/2017    Procedure: INCISION AND DRAINAGE (I&D) EXTREMITY, APPLICATION OF GRAFT;  Surgeon: Farooq Sears DPM;  [facility];  Service:     IR BIOPSY OTHER  07/02/2020    IR LOWER EXTREMITY ANGIOGRAM  02/08/2021    IR LOWER EXTREMITY ANGIOGRAM  02/11/2021    IR NEPHROSTOMY TUBE CHECK/CHANGE/REPOSITION/REINSERTION/UPSIZE  04/28/2022    IR NEPHROSTOMY TUBE CHECK/CHANGE/REPOSITION/REINSERTION/UPSIZE  05/24/2022    IR NEPHROSTOMY TUBE CHECK/CHANGE/REPOSITION/REINSERTION/UPSIZE  06/07/2022    IR NEPHROSTOMY TUBE CHECK/CHANGE/REPOSITION/REINSERTION/UPSIZE  07/28/2022    IR NEPHROSTOMY TUBE CHECK/CHANGE/REPOSITION/REINSERTION/UPSIZE  11/15/2022    IR NEPHROSTOMY TUBE PLACEMENT  02/25/2022    IR PORT PLACEMENT  01/17/2022    IR THORACENTESIS  09/02/2022    IR THORACENTESIS  09/14/2022    IR THORACENTESIS WITH TUBE PLACEMENT Left     october    IR TUNNELED CENTRAL LINE PLACEMENT  12/24/2020    JOINT REPLACEMENT      christofer knees replaced    MA AMPUTATION METATARSAL W/TOE SINGLE Left 12/21/2020    Procedure: RAY RESECTION FOOT;  Surgeon: Sanjuana Kmi DPM;  [facility];  Service: Podiatry    MA AMPUTATION METATARSAL W/TOE SINGLE Left 12/31/2020    Procedure: 5TH MET RESECTION;  Surgeon: Sanjuana Kim DPM;  [facility];  Service: Podiatry    MA CYSTO BLADDER W/URETERAL CATHETERIZATION Right 12/14/2022    Procedure: CYSTOSCOPY WITH RETROGRADE PYELOGRAM and CLOT EVACUATION, RIGHT STENT INSERTION, FULGRATION OF BLEEDING POINTS;  Surgeon: Alissa Martin MD;  [facility];  Service: Urology    KY CYSTO W/IRRIG & EVAC MULTPLE OBSTRUCTING CLOTS N/A 02/10/2021    Procedure: CYSTOSCOPY EVACUATION OF CLOTS, fulguration;  Surgeon: Jane Parker MD;  [facility];  Service: Urology    KY CYSTO W/IRRIG & EVAC MULTPLE OBSTRUCTING CLOTS N/A 10/24/2021    Procedure: CYSTOSCOPY EVACUATION OF CLOT, fulguration of bleeding vessels, right ureter stent placement, retrograde pyelogram;  Surgeon: Carie Bhat MD;  [facility];  Service: Urology    KY CYSTO W/REMOVAL OF LESIONS SMALL N/A 11/19/2020    Procedure: CYSTO W/BIOPSIES, transurethral prostate bx;  Surgeon: Charlotte Castro MD;  [facility];  Service: Urology    KY CYSTOURETHROSCOPY W/DEST &/RMVL TUMOR LARGE Bilateral 10/18/2021    Procedure: TRANSURETHRAL RESECTION OF BLADDER TUMOR (TURBT);   Surgeon: Esthela Long MD;  [facility];  Service: Urology    KY CYSTOURETHROSCOPY WITH BIOPSY N/A 08/16/2016    Procedure: Matthew Iniguez;  Surgeon: Jaiden Estrella MD;  [facility];  Service: Urology    [place] [address] [facility] 3RD+ ORD [address] [facility] Left 02/08/2021    Procedure: LEG angiogram, CO2 w/limited contrast with balloon angioplasty postertior tibial artery;  Surgeon: Dolly Hernandez MD;  [facility];  Service: Vascular    ROTATOR CUFF REPAIR      SMALL INTESTINE SURGERY      Surgery Shaji-en-Y    SPINAL FUSION      lumbar and cervical fusions    VAC DRESSING APPLICATION Right 00/35/1753    Procedure: APPLICATION VAC DRESSING;  Surgeon: Yancey Crigler, DPM;  [facility];  Service:     WOUND DEBRIDEMENT Left 02/16/2021    Procedure: FOOT DEBRIDE, [address] OUT w/graft application;  Surgeon: Yancey Crigler, DPM;  [facility];  Service: Podiatry        01/09/23 0915   PT Last Visit   PT Visit Date 01/09/23   Note Type   Note type Evaluation   Pain Assessment   Pain Assessment Tool 0-10   Pain Score 8   Pain Location/Orientation Location: [facility] Pain Intervention(s) Repositioned; Ambulation/increased activity; Emotional support; Rest   Restrictions/Precautions   Braces or Orthoses Other (Comment)  (resting hand splint R (recommended by neuro during last admission))   Other Precautions Contact/isolation; Chair Alarm; Bed Alarm;Multiple lines;Limb alert; Fall Risk;Pain  (CBI, bl nephrostomy tubes)   Home Living   Type of [address] [place] [address] Two level;Stairs to enter without rails;1/2 bath on main level;Bed/bath upstairs   Bathroom Equipment Shower chair   [address] [facility]; Other (Comment); Stair glide  (Rollator)   Additional Comments 3 v 9 ADILENE  stair glide to second floor   Prior Function   Level of [place] Independent with ADLs; Independent with functional mobility; Needs assistance with IADLS   Lives With Spouse   Receives Help From Family   IADLs Family/Friend/Other provides transportation; Family/Friend/Other provides meals; Family/Friend/Other provides medication management   Falls in the last 6 months 1 to 4  (4)   Vocational Retired   Comments at baseline indep for ADLs and ambulation  comes to [facility] from rehab, reports getting assist for ADLs and amb w RW   General   Additional Pertinent History pt admitted 1/7/23 for bacteremia  up w assist orders  prev admitted to [facility] from 12/12-22/22, dc to [address] [place] [address] from 12/22-1/2/23  pt had a fall at [facility] and was readmitted to [address] [place] [address] from 1/2-4  he was readmitted to [facility] 1/4-7/23 then transf to [facility] for further medical management of admitting dx   PMHx significant for anxiety, CVD, CA, arthritis, CKD, CAD, depression, DM, glaucoma, PAD   Cognition   Overall Cognitive Status Impaired   Arousal/Participation Cooperative   Attention Attends with cues to redirect   Orientation Level Oriented to person;Oriented to place;Oriented to time   Memory Decreased recall of precautions;Decreased recall of recent events   Following Commands Follows one step commands with increased time or repetition   Comments noted some difficulty answering questions/ w recall- unreliable historian   RLE Assessment   RLE Assessment WFL   LLE Assessment   LLE Assessment WFL   Bed Mobility   Supine to Sit 3  Moderate assistance   Additional items Assist x 2;HOB elevated; Bedrails; Increased time required;Verbal cues;LE management; Other  (antalgic)   Transfers   Sit to Stand 3  Moderate assistance   Additional items Assist x 2; Increased time required;Verbal cues; Other  (RW, bed height elevated)   Stand to Sit 3  Moderate assistance   Additional items Assist x 2;Armrests; Increased time required;Verbal cues; Other  (RW)   Ambulation/Elevation   Gait pattern Antalgic; Forward Flexion; Excessively slow   Gait Assistance 4  Minimal assist   Additional items Assist x 1;Assist x 2;Verbal cues   Assistive Device Rolling walker   Distance 13'   Balance   Static Standing Fair -   Dynamic Standing Poor +   Ambulatory Poor +   Endurance Deficit   Endurance Deficit No   Activity Tolerance   Activity Tolerance Patient limited by pain   Medical Staff 115 Angela Aguirre OT   Nurse Made Aware Burnie Parcel RN   Assessment   Prognosis Good   Problem List Decreased strength; Impaired balance;Decreased mobility; Impaired judgement;Decreased safety awareness;Pain   Assessment Aliyah Hernandez is a 76 y o  male admitted to [facility] on 1/7/2023 for Bacteremia due to Enterobacter species  PT was consulted and pt was seen on 1/9/2023 for mobility assessment and d/c planning  Pt presents w high fall risk, multiple lines, acute pain of groin  At baseline pt is indep for ADLs and ambulation  Reports getting assist for ADLs and mobility at rehab  Note R resting hand splint secondary to acute wrist drop; pt reports wearing brace at rest, not needed for activity   Pt is currently functioning at a mod Ax2 for bed mobility and sit>stand transf due to high pain levels and submaximal effort due to same  Pt demonstrated improved tolerance once standing  No gross LOB and able to progress to min Ax2> min Ax1  Does require frequent cuing during session for technique and safety awareness  Repositioned end of session for comfort  Pt will benefit from continued skilled IP PT to address the above mentioned impairments  in order to maximize recovery and increase functional independence when completing mobility and ADLs  At this time PT recommendations for d/c are STR  Barriers to Discharge Inaccessible home environment;Decreased caregiver support   Goals   Patient Goals decrease pain   STG Expiration Date 01/23/23   Short Term Goal #1 1)  Pt will perform bed mobility with S demonstrating appropriate technique 100% of the time in order to improve function  2)  Perform all transfers with S demonstrating safe and appropriate technique 100% of the time in order to improve ability to negotiate safely in home environment  3) Amb with least restrictive AD > 50'x1 with S in order to demonstrate ability to negotiate in home environment  4)  Improve overall strength and balance 1/2 grade in order to optimize ability to perform functional tasks and reduce fall risk  5) Increase activity tolerance to 45 minutes in order to improve endurance to functional tasks  6)  Negotiate stairs using most appropriate technique and min A in order to be able to negotiate safely in home environment  7) PT for ongoing patient and family/caregiver education, DME needs and d/c planning in order to promote highest level of function in least restrictive environment  Plan   Treatment/Interventions Functional transfer training;LE strengthening/ROM; Elevations; Therapeutic exercise;Cognitive reorientation;Patient/family training;Equipment eval/education; Bed mobility;Gait training; Compensatory technique education;Continued evaluation;Spoke to nursing;OT   PT Frequency 3-5x/wk   Recommendation   PT Discharge Recommendation Post acute rehabilitation services   AM-PAC Basic Mobility Inpatient   Turning in Flat Bed Without Bedrails 2   Lying on Back to Sitting on Edge of Flat Bed Without Bedrails 1   Moving Bed to Chair 1   Standing Up From Chair Using Arms 1   Walk in Room 3   Climb 3-5 Stairs With Railing 3   Basic Mobility Inpatient Raw Score 11   Basic Mobility Standardized Score 30 25   Highest Level Of Mobility   -HLM Goal 4: Move to chair/commode   JH-HLM Achieved 6: Walk 10 steps or more   End of Consult   Patient Position at End of Consult Bedside chair;Bed/Chair alarm activated; All needs within reach   History: co - morbidities, social background, past experience, fall risk, use of assistive device, assist for Iadl's, cognition, multiple lines  Exam: impairments in systems including musculoskeletal (strength), neuromuscular (balance, gait, transfers, motor function), am-pac, cognition  Clinical: unstable/unpredictable  Complexity:high      Miguelangel Whipple, PT

## 2023-01-09 NOTE — CASE MANAGEMENT
Case Management Assessment & Discharge Planning Note    Patient name Asher Layton  Location [address] [place] [address] /[address]-* MRN [id number]  : 1945 Date 2023       Current Admission Date: 2023  Current Admission Diagnosis:Bacteremia due to Enterobacter species   Patient Active Problem List    Diagnosis Date Noted   • Nephrostomy status ([facility] ) 2023   • Acute on chronic blood loss anemia 2023   • Bacteremia due to Enterobacter species 2023   • Sinus tachycardia 2023   • Elevated brain natriuretic peptide (BNP) level 2023   • Ambulatory dysfunction 2023   • Influenza A 2023   • Elevated troponin 2023   • Right sided weakness 2022   • Stroke-like symptoms 2022   • Symptomatic hypotension 2022   • Insomnia 2022   • Right wrist drop 2022   • Chronic pleural effusion 2022   • UTI (urinary tract infection) 2022   • SOB (shortness of breath) 2022   • History of tobacco use disorder 2022   • Retroperitoneal lymphadenopathy 2022   • Pulmonary nodules 2022   • Syncope and collapse 2022   • Depression with suicidal ideation 2022   • Cancer related pain 2022   • Bilateral hydronephrosis 04/10/2022   • Stage 4 chronic kidney disease ([address] ) 2022   • Fall 2022   • Hyperkalemia 2022   • Retained ureteral stent    • 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[facility] ) 2022   • Embolism and 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 ([facility] ) 2022   • Abnormal CT scan, bladder 2022   • Port-A-Cath in place 2022   • Continuous opioid dependence ([address] ) 2021   • Hematuria 10/24/2021   • Acute urinary retention 10/21/2021   • Chronic pain syndrome 2021   • Lumbar spondylosis    • Chronic bronchitis ([place] [address] ) 2021   • Moderate major depression, single episode ([place] [address] ) 2021   • Thrombocytopenia ([place] [address] ) 2021   • 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 03/02/2021   • Gross hematuria 02/09/2021   • PAD (peripheral artery disease) ([facility])    • Left carotid bruit    • Anemia of chronic disease 12/19/2020   • Preoperative clearance 12/19/2020   • Symptomatic anemia 10/10/2020   • Diabetic ulcer of left foot associated with type 2 diabetes mellitus, 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 ([address] ) 10/08/2020   • Acute kidney injury superimposed on CKD ([facility] )    • Dysuria 08/17/2020   • Diabetic polyneuropathy associated with type 2 diabetes mellitus ([address] ) 07/16/2020   • Abnormal MRI, lumbar spine 06/29/2020   • Malignant neoplasm of anterior wall of urinary bladder ([address] )    • Secondary renal hyperparathyroidism ([address] ) 02/12/2020   • Preop examination 04/16/2019   • Superficial phlebitis 03/07/2019   • Arteriosclerosis of artery of extremity ([address] ) 02/22/2019   • Stable angina pectoris ([address] ) 02/22/2019   • Herpes zoster without complication 02/86/3258   • Localized edema 02/22/2019   • Back pain 01/31/2019   • Degeneration of lumbar intervertebral disc 12/03/2018   • Primary osteoarthritis of left knee 03/16/2018   • Bladder carcinoma ([facility] ) 08/16/2016   • Presence of stent in coronary artery 08/16/2016   • Hypertension with renal disease 10/29/2015   • Hyperlipidemia 10/29/2015   • Cystitis due to intravesical BCG administration 09/24/2015   • Coronary artery disease of native artery of native heart with stable angina pectoris ([facility] ) 05/19/2011   • Type 2 diabetes mellitus, with long-term current use of insulin ([facility] ) 01/09/2004      LOS (days): 2  Geometric Mean LOS (GMLOS) (days): 3 50  Days to GMLOS:1 8     OBJECTIVE:    Risk of Unplanned Readmission Score: 66 68         Current admission status: Inpatient       Preferred Pharmacy:   [facility] [address]   [address]  Phone: [phone] Fax: [phone]    [address] [place] [address], [address] [place] [address] [address]  Phone: [phone] Fax: [phone]    Primary Care Provider: Ghada Rowley MD    Primary Insurance: MEDICARE  Secondary Insurance: BLUE CROSS    ASSESSMENT:   [address], [address] [facility] Representative - Spouse   Primary Phone: [phone] [facility]  Home Phone: [phone]               Advance Directives  Does patient have a [address]?:  (unknown to spouse)  Does patient have Advance Directives? :  (unknown to spouse)  Primary Contact: Aruna Cates [phone]    Readmission Root Cause  30 Day Readmission: No    Patient Information  Admitted from[de-identified] Facility  Mental Status: Alert  During Assessment patient was accompanied by: Not accompanied during assessment  Assessment information provided by[de-identified] Spouse  Primary Caregiver: Spouse  Caregiver's Name[de-identified] Katheryn Franklin Relationship to Patient[de-identified] Family Member  Caregiver's Telephone Number[de-identified] [phone]  Support Systems: Spouse/significant other, Son, Family members  [place] of Residence: [address] [place] [address] do you live in?: [address] [place] entry access options   Select all that apply : Ramp  Type of Current Residence: [address] [place] home  Upon entering residence, is there a bedroom on the main floor (no further steps)?: Yes  Upon entering residence, is there a bathroom on the main floor (no further steps)?: Yes  Living Arrangements: Lives w/ Spouse/significant other    Activities of Daily Living Prior to Admission  Functional Status: Assistance  Completes ADLs independently?: No  Level of ADL dependence: Assistance  Ambulates independently?: No  Level of ambulatory dependence: Assistance  Does patient use assisted devices?: Yes  Assisted Devices (DME) used: Rosmery Sinks  Does patient currently own DME?: Yes  What DME does the patient currently own?: Rosmery Sinks  Does patient have a history of Outpatient Therapy (PT/OT)?: No  Does the patient have a history of Short-Term Rehab?: Yes ([facility])  Does patient have a history of [address]?: No  Does patient currently have [address]?: No         Patient Information Continued  Does patient have prescription coverage?: Yes  Does patient have a history of substance abuse?: No  Does patient have a history of Mental Health Diagnosis?: No    Means of Transportation  Means of Transport to [facility][de-identified] Family transport        DISCHARGE DETAILS:    Discharge planning discussed with[de-identified] patient's spouse Graciela Fontanez contacted family/caregiver?: Yes       Contacts  Patient Contacts: Mal Valenzuela (spouse)  Relationship to Patient[de-identified] Family  Contact Method: Phone  Phone Number: [phone]  Reason/Outcome: Continuity of Care, Referral, Emergency Contact, Discharge Planning    Patient was admitted from [facility] doing rehab  CM placed LOUIE referral to [address] [place] [address]    Update:    Patient & family do not want to discharge to [address] [place] [address] for rehab   They would like referrals sent and they will choose from accepting

## 2023-01-09 NOTE — PROGRESS NOTES
[address] [place] [address]  Progress Note - Albino Choudhury 1945, 76 y o  male MRN: [id number]  Unit/Bed#: E5 -01 Encounter: [id number]  Primary Care Provider: Altagracia Morrow MD   Date and time admitted to hospital: 1/7/2023 10:17 PM    * Bacteremia due to Enterobacter species  Assessment & Plan  · Patient was transferred from [address] [place] [address] due to fever and positive blood cultures  · Blood culture growing gram-negative rods   · BC ID: Enterobacter cloacae complex  Recommendation for Cefepime  Avoid 1st, 2nd, and 3rd generation cephalosporins  · Likely urinary source  Ucx Dec grew both E cloacae and E faecalis  · Given one dose of IV Zosyn and vancomycin prior to transfer  · Continue IV Cefepime 1g BID, renally dosed, follow ID recommendations  · Repeat blood cultures no growth at 24 hours  · Follow-up urine culture growing E coli  · ID consulted appreciate recommendations  · Pt/ot recommending acute rehab     Gross hematuria  Assessment & Plan  · Patient has persistent hematuria due to bladder cancer  History of recurrent UTI on suppressive Bactrim  · CT A/P: Stable bladder wall thickening, compatible with known malignancy  Increased hyperdensity within the bladder lumen, compatible with new blood clot  · Recent admission at [facility]/12-12/22 for hematuria and obstructive uropathy in the setting of known bladder cancer s/p BCG and subsequent chemotherapy/radiation s/p bilateral chronic PCN tubes   Noted to have new right-sided hydronephrosis on CT s/p cystoscopy, clot evacuation w/ right ureteral stenting on 12/14 by Dr Bee Barker  · UA bloody, innumerable RBC/WBC, +leuks, no nitrites  · Continue q4h and irrigation  · Continue wagner and CBI  · Monitor I&O  · Monitor Hg  · Urine culture growing E coli  · Urology consulted appreciate recommendations    Stage 4 chronic kidney disease [facility])  Assessment & Plan  Lab Results   Component Value Date    EGFR 18 01/09/2023    EGFR 17 01/08/2023    EGFR 17 01/07/2023    CREATININE 3 11 (H) 01/09/2023    CREATININE 3 18 (H) 01/08/2023    CREATININE 3 21 (H) 01/07/2023     · Chronic CKD 4 at baseline  Monitor BMP    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)  Assessment & Plan  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, recurrence with CIS in 2016 BCG again  BCG refractory disease with pelvic metastases now s/p chemoradiation 2021, on current immunotherapy  · Chronic B/L PCN tubes  · CT A/P Jan 7: Stable bladder wall thickening, compatible with known malignancy  Stable metastatic retroperitoneal lymphadenopathy  Stable epicardial metastasis  · Per oncology note on Jan 6: Chemotherapy treatment should remain on hold for now untill he is clinically stable/discharged; particularly since he is positive flu  To follow-up with primary oncologist after discharge for reevaluation prior to resume treatment  · Likely IR for b/l PCN exchange this hospitalization    Sinus tachycardia-resolved as of 1/9/2023  Assessment & Plan  · Presented with tachycardia likely multifactorial given fever, blood loss anemia and hypotension  · On Jan 2, patient had a brief period of unresponsiveness and noted with hypotension  Blood pressure improved after holding medications: furosemide, Imdur, metoprolol and tamsulosin  · 1/6/23 seen by cardiology recommended gradually increasing metoprolol to 37 5mg twice daily and subsequently to 50 mg twice daily if his blood pressure would allow  · Continue metoprolol 25mg BID for now with hold parameters      Nephrostomy status ([facility] [address] )  Assessment & Plan  · Chronic bilateral nephrostomy tubes  · Prior to transfer patient reported abdominal pain, CT was done and shows: Stable bilateral percutaneous nephrostomy tubes with interval placement of a right double-J nephroureteral stent  Stable mild right upper pole caliectasis, with resolution of previously seen lower pole caliectasis and hydroureter  Persistent blood   · Likely exchange this admission  · Appreciate urology recommendations  · Monitor I&O    Elevated brain natriuretic peptide (BNP) level  Assessment & Plan  · BNP >2000  EF 60-65%, normal wall motion  · CT small stable left pleural effusion with drainage catheter in place  · Continue lasix 20 mg daily with hold parameters  · Monitor daily weight    Chronic pleural effusion  Assessment & Plan  · Chronic pleural effusion  CT showing Stable small left pleural effusion with drainage catheter in place  Stable epicardial metastasis    Type 2 diabetes mellitus, with long-term current use of insulin [facility])  Assessment & Plan  Lab Results   Component Value Date    HGBA1C 7 2 (H) 10/20/2022     · Continue Lantus 15 units with sliding scale TID  · ADA diet    Recent Labs     01/08/23  2037 01/09/23  0721 01/09/23  1124 01/09/23  1556   POCGLU 238* 78 207* 166*       Blood Sugar Average: Last 72 hrs:  (P) 151 25    Coronary artery disease of native artery of native heart with stable angina pectoris (HCC)  Assessment & Plan  · Outpatient regimen ASA, Imdur, metoprolol, zetia    Acute on chronic blood loss anemia  Assessment & Plan  · History of hematuria due to bladder cancer  · Hg 7 1 prior to transfer  Received 1 unit PRBC  · Hemoglobin 7 3 today  · Iron panel showing continued deficiency   · Hold ASA and duloxetine  · Hem/Onc consulted recommending keeping hemoglobin <7 transfuse as needed    Influenza A  Assessment & Plan  · Patient tested + for influenza A prior to discharge to [facility] on [address]  · Asymptomatic therefore not treated   · Negative flu on Jan 7    Continuous opioid dependence [facility])  Assessment & Plan  · Maintained on oxycodone 10mg TID prn, verified on PDMP    Symptomatic hypotension  Assessment & Plan  · Patient had episode of unresponsiveness with hypotension on Jan 2  He was sent from [address] [place] [address] to ER  · Noted with anemia  Received IV bolus in ED and 2u PRBC during prior admission    Improved and medically cleared for return to [facility]   · Close BP monitoring        VTE Pharmacologic Prophylaxis: VTE Score: 6 High Risk (Score >/= 5) - Pharmacological DVT Prophylaxis Contraindicated  Sequential Compression Devices Ordered  Patient Centered Rounds: I performed bedside rounds with nursing staff today  Discussions with Specialists or Other Care Team Provider: none    Education and Discussions with Family / Patient: Attempted to update  (wife) via phone  Left voicemail  Time Spent for Care: 30 minutes  More than 50% of total time spent on counseling and coordination of care as described above  Current Length of Stay: 2 day(s)  Current Patient Status: Inpatient   Certification Statement: The patient will continue to require additional inpatient hospital stay due to gross hematuria and acute on chronic anemia requiring inpatient workup and transfusion as needed  Discharge Plan: Anticipate discharge in 48 hrs to rehab facility  Code Status: Level 1 - Full Code    Subjective:   Patient seen sitting in chair at bedside  He reports feeling well today  He reports continued back pain today  He denies any fever, chills, nausea, vomiting  He denies any other acute complaints at this time  Objective:     Vitals:   Temp (24hrs), Av °F (36 7 °C), Min:97 8 °F (36 6 °C), Max:98 2 °F (36 8 °C)    Temp:  [97 8 °F (36 6 °C)-98 2 °F (36 8 °C)] 98 2 °F (36 8 °C)  HR:  [70-91] 72  Resp:  [16-18] 16  BP: (106-131)/(64-83) 110/65  SpO2:  [97 %-99 %] 98 %  Body mass index is 24 15 kg/m²  Input and Output Summary (last 24 hours): Intake/Output Summary (Last 24 hours) at 2023 1638  Last data filed at 2023 1523  Gross per 24 hour   Intake --   Output 36148 ml   Net -02109 ml       Physical Exam:   Physical Exam  Vitals and nursing note reviewed  Constitutional:       Appearance: Normal appearance  Eyes:      General: No scleral icterus    Cardiovascular:      Rate and Rhythm: Normal rate and regular rhythm  Pulmonary:      Effort: Pulmonary effort is normal       Breath sounds: Normal breath sounds  No wheezing  Abdominal:      General: Abdomen is flat  Bowel sounds are normal       Palpations: Abdomen is soft  Tenderness: There is no abdominal tenderness  Musculoskeletal:      Right lower leg: No edema  Left lower leg: No edema  Skin:     General: Skin is warm and dry  Comments: Catheter bag with bright red blood   Neurological:      Mental Status: He is alert  Mental status is at baseline  Psychiatric:         Mood and Affect: Mood normal          Behavior: Behavior normal        Additional Data:     Labs:  Results from last 7 days   Lab Units 01/09/23  0514   WBC Thousand/uL 9 47   HEMOGLOBIN g/dL 7 3*   HEMATOCRIT % 22 6*   PLATELETS Thousands/uL 180   NEUTROS PCT % 59   LYMPHS PCT % 24   MONOS PCT % 7   EOS PCT % 9*     Results from last 7 days   Lab Units 01/09/23  0514 01/08/23  0539 01/07/23  1613   SODIUM mmol/L 139   < > 135   POTASSIUM mmol/L 4 5   < > 5 5*   CHLORIDE mmol/L 106   < > 101   CO2 mmol/L 26   < > 25   BUN mg/dL 47*   < > 57*   CREATININE mg/dL 3 11*   < > 3 21*   ANION GAP mmol/L 7   < > 9   CALCIUM mg/dL 7 8*   < > 8 9   ALBUMIN g/dL  --   --  3 4*   TOTAL BILIRUBIN mg/dL  --   --  0 63   ALK PHOS U/L  --   --  96   ALT U/L  --   --  24   AST U/L  --   --  34   GLUCOSE RANDOM mg/dL 53*   < > 207*    < > = values in this interval not displayed       Results from last 7 days   Lab Units 01/07/23  1613   INR  0 90     Results from last 7 days   Lab Units 01/09/23  1556 01/09/23  1124 01/09/23  0721 01/08/23  2037 01/08/23  1631 01/08/23  1132 01/08/23  0721 01/07/23  2357 01/04/23  1131 01/04/23  0637 01/04/23  0554 01/03/23  2143   POC GLUCOSE mg/dl 166* 207* 78 238* 124 128 95 174* 100 102 67 257*         Results from last 7 days   Lab Units 01/07/23  1613 01/07/23  1114 01/06/23  0704   LACTIC ACID mmol/L 1 4  --   --    PROCALCITONIN ng/ml  -- 0 65* 0 47*       Lines/Drains:  Invasive Devices     Central Venous Catheter Line  Duration           Port A Cath Right Chest -- days          Peripheral Intravenous Line  Duration           Peripheral IV 01/02/23 Left Antecubital 7 days    Peripheral IV 01/07/23 Left Antecubital 2 days          Drain  Duration           Nephrostomy Left 10 2 Fr  55 days    Nephrostomy Right 10 2 Fr  55 days    Urethral Catheter Three way 20 Fr  1 day              Urinary Catheter:  Goal for removal: N/A- Discharging with [place]         Central Line:  Goal for removal: Will discontinue when meds requiring line are completed  Imaging: No pertinent imaging reviewed  Recent Cultures (last 7 days):   Results from last 7 days   Lab Units 01/07/23  1653 01/07/23  1613 01/07/23  1611 01/06/23  2030 01/06/23  1530   BLOOD CULTURE   --  No Growth at 24 hrs  No Growth at 24 hrs   Enterobacter cloacae*  --    GRAM STAIN RESULT   --   --   --  Gram negative rods*  --    URINE CULTURE  >100,000 cfu/ml Escherichia coli*  --   --   --  >100,000 cfu/ml Pseudomonas putida group*  >100,000 cfu/ml - Presumptive Stenotrophomonas maltophilia*  50,000-59,000 cfu/ml Candida albicans*       Last 24 Hours Medication List:   Current Facility-Administered Medications   Medication Dose Route Frequency Provider Last Rate   • acetaminophen  975 mg Oral formerly [facility] Armond Quintanilla PA-C     • ALPRAZolam  0 25 mg Oral BID PRN Sharene Asai, CRNP     • aluminum-magnesium hydroxide-simethicone  30 mL Oral Q6H PRN Sharene Asai, CRNP     • ARIPiprazole  2 mg Oral Daily Sharene Asai, CRNP     • azelastine  1 spray Each Nare BID PRN Sharene Asai, CRNP     • bimatoprost  1 drop Both Eyes HS Sharene Asai, CRNP     • calcitriol  0 25 mcg Oral Daily Sharene Asai, CRNP     • cefepime  1,000 mg Intravenous Q12H Sharene Asai, CRNP 1,000 mg (01/09/23 1118)   • cyanocobalamin  1,000 mcg Oral Daily Sharene Asai, CRNP     • ezetimibe  10 mg Oral Daily Rushie Cooks, MOENP     • furosemide  20 mg Oral Daily Rushie Cooks, CRNP     • gabapentin  300 mg Oral HS Rushie Cooks, MOENP     • HYDROmorphone  0 2 mg Intravenous 4x Daily PRN Rushie Cooks, CRNP     • insulin glargine  15 Units Subcutaneous HS Lorna Bruce PA-C     • insulin lispro  1-5 Units Subcutaneous HS Rushie Cooks, EVANGELINA     • insulin lispro  1-6 Units Subcutaneous TID AC Rushie Cooks, MOENP     • metoprolol tartrate  25 mg Oral Q12H [facility], CRNP     • multivitamin stress formula  1 tablet Oral Daily Rushie Cooks, MOENP     • ondansetron  4 mg Intravenous Q6H PRN Rushie Cooks, CRNP     • oxybutynin  5 mg Oral TID Ken Garcia, EVANGELINA     • oxyCODONE  10 mg Oral TID PRN Rushie Cooks, CRNP     • pantoprazole  20 mg Oral Early Morning Rushie Cooks, CRNP     • polyethylene glycol  17 g Oral Daily PRN Rushie Cooks, CRNP     • senna  2 tablet Oral BID PRN Rushie Cooks, CRNP     • simethicone  80 mg Oral 4x Daily PRN Rushie Cooks, CRNP          Today, Patient Was Seen By: Kalli Zee PA-C    **Please Note: This note may have been constructed using a voice recognition system  **

## 2023-01-10 ENCOUNTER — APPOINTMENT (INPATIENT)
Dept: RADIOLOGY | Facility: HOSPITAL | Age: 78
End: 2023-01-10

## 2023-01-10 ENCOUNTER — APPOINTMENT (INPATIENT)
Dept: NON INVASIVE DIAGNOSTICS | Facility: HOSPITAL | Age: 78
End: 2023-01-10

## 2023-01-10 ENCOUNTER — TELEPHONE (OUTPATIENT)
Dept: UROLOGY | Facility: MEDICAL CENTER | Age: 78
End: 2023-01-10

## 2023-01-10 LAB
ANION GAP SERPL CALCULATED.3IONS-SCNC: 7 MMOL/L (ref 4–13)
BUN SERPL-MCNC: 48 MG/DL (ref 5–25)
CALCIUM SERPL-MCNC: 8.3 MG/DL (ref 8.3–10.1)
CHLORIDE SERPL-SCNC: 105 MMOL/L (ref 96–108)
CO2 SERPL-SCNC: 26 MMOL/L (ref 21–32)
CREAT SERPL-MCNC: 2.95 MG/DL (ref 0.6–1.3)
ERYTHROCYTE [DISTWIDTH] IN BLOOD BY AUTOMATED COUNT: 15.5 % (ref 11.6–15.1)
GFR SERPL CREATININE-BSD FRML MDRD: 19 ML/MIN/1.73SQ M
GLUCOSE SERPL-MCNC: 111 MG/DL (ref 65–140)
GLUCOSE SERPL-MCNC: 149 MG/DL (ref 65–140)
GLUCOSE SERPL-MCNC: 161 MG/DL (ref 65–140)
GLUCOSE SERPL-MCNC: 166 MG/DL (ref 65–140)
GLUCOSE SERPL-MCNC: 198 MG/DL (ref 65–140)
HCT VFR BLD AUTO: 23.2 % (ref 36.5–49.3)
HGB BLD-MCNC: 7.4 G/DL (ref 12–17)
MCH RBC QN AUTO: 29.7 PG (ref 26.8–34.3)
MCHC RBC AUTO-ENTMCNC: 31.9 G/DL (ref 31.4–37.4)
MCV RBC AUTO: 93 FL (ref 82–98)
PLATELET # BLD AUTO: 192 THOUSANDS/UL (ref 149–390)
PMV BLD AUTO: 9.7 FL (ref 8.9–12.7)
POTASSIUM SERPL-SCNC: 4.5 MMOL/L (ref 3.5–5.3)
RBC # BLD AUTO: 2.49 MILLION/UL (ref 3.88–5.62)
SODIUM SERPL-SCNC: 138 MMOL/L (ref 135–147)
WBC # BLD AUTO: 11.6 THOUSAND/UL (ref 4.31–10.16)

## 2023-01-10 PROCEDURE — 0JPT0WZ REMOVAL OF TOTALLY IMPLANTABLE VASCULAR ACCESS DEVICE FROM TRUNK SUBCUTANEOUS TISSUE AND FASCIA, OPEN APPROACH: ICD-10-PCS | Performed by: RADIOLOGY

## 2023-01-10 PROCEDURE — 0JPT3WZ REMOVAL OF TOTALLY IMPLANTABLE VASCULAR ACCESS DEVICE FROM TRUNK SUBCUTANEOUS TISSUE AND FASCIA, PERCUTANEOUS APPROACH: ICD-10-PCS | Performed by: STUDENT IN AN ORGANIZED HEALTH CARE EDUCATION/TRAINING PROGRAM

## 2023-01-10 PROCEDURE — BT131ZZ FLUOROSCOPY OF BILATERAL KIDNEYS USING LOW OSMOLAR CONTRAST: ICD-10-PCS | Performed by: RADIOLOGY

## 2023-01-10 PROCEDURE — 0T25X0Z CHANGE DRAINAGE DEVICE IN KIDNEY, EXTERNAL APPROACH: ICD-10-PCS | Performed by: RADIOLOGY

## 2023-01-10 RX ORDER — FENTANYL CITRATE 50 UG/ML
INJECTION, SOLUTION INTRAMUSCULAR; INTRAVENOUS AS NEEDED
Status: COMPLETED | OUTPATIENT
Start: 2023-01-10 | End: 2023-01-10

## 2023-01-10 RX ORDER — LIDOCAINE HYDROCHLORIDE 10 MG/ML
INJECTION, SOLUTION EPIDURAL; INFILTRATION; INTRACAUDAL; PERINEURAL AS NEEDED
Status: COMPLETED | OUTPATIENT
Start: 2023-01-10 | End: 2023-01-10

## 2023-01-10 RX ADMIN — OXYBUTYNIN CHLORIDE 5 MG: 5 TABLET ORAL at 23:23

## 2023-01-10 RX ADMIN — FENTANYL CITRATE 50 MCG: 50 INJECTION INTRAMUSCULAR; INTRAVENOUS at 15:22

## 2023-01-10 RX ADMIN — METOPROLOL TARTRATE 25 MG: 25 TABLET, FILM COATED ORAL at 09:29

## 2023-01-10 RX ADMIN — CEFEPIME HYDROCHLORIDE 1000 MG: 1 INJECTION, POWDER, FOR SOLUTION INTRAMUSCULAR; INTRAVENOUS at 23:23

## 2023-01-10 RX ADMIN — LIDOCAINE HYDROCHLORIDE 10 ML: 10 INJECTION, SOLUTION EPIDURAL; INFILTRATION; INTRACAUDAL; PERINEURAL at 14:48

## 2023-01-10 RX ADMIN — INSULIN LISPRO 1 UNITS: 100 INJECTION, SOLUTION INTRAVENOUS; SUBCUTANEOUS at 09:29

## 2023-01-10 RX ADMIN — IOHEXOL 20 ML: 350 INJECTION, SOLUTION INTRAVENOUS at 15:22

## 2023-01-10 RX ADMIN — ACETAMINOPHEN 975 MG: 325 TABLET, FILM COATED ORAL at 05:05

## 2023-01-10 RX ADMIN — CALCITRIOL 0.25 MCG: 0.25 CAPSULE, LIQUID FILLED ORAL at 09:29

## 2023-01-10 RX ADMIN — CYANOCOBALAMIN TAB 500 MCG 1000 MCG: 500 TAB at 09:29

## 2023-01-10 RX ADMIN — HYDROMORPHONE HYDROCHLORIDE 0.2 MG: 0.2 INJECTION, SOLUTION INTRAMUSCULAR; INTRAVENOUS; SUBCUTANEOUS at 04:24

## 2023-01-10 RX ADMIN — PANTOPRAZOLE SODIUM 20 MG: 20 TABLET, DELAYED RELEASE ORAL at 05:05

## 2023-01-10 RX ADMIN — GABAPENTIN 300 MG: 300 CAPSULE ORAL at 23:23

## 2023-01-10 RX ADMIN — OXYCODONE HYDROCHLORIDE 10 MG: 10 TABLET ORAL at 20:01

## 2023-01-10 RX ADMIN — OXYBUTYNIN CHLORIDE 5 MG: 5 TABLET ORAL at 09:29

## 2023-01-10 RX ADMIN — INSULIN GLARGINE 15 UNITS: 100 INJECTION, SOLUTION SUBCUTANEOUS at 23:23

## 2023-01-10 RX ADMIN — METOPROLOL TARTRATE 25 MG: 25 TABLET, FILM COATED ORAL at 23:26

## 2023-01-10 RX ADMIN — BIMATOPROST 1 DROP: 0.1 SOLUTION/ DROPS OPHTHALMIC at 23:26

## 2023-01-10 RX ADMIN — ARIPIPRAZOLE 2 MG: 2 TABLET ORAL at 09:29

## 2023-01-10 RX ADMIN — OXYCODONE HYDROCHLORIDE 10 MG: 10 TABLET ORAL at 02:19

## 2023-01-10 RX ADMIN — ACETAMINOPHEN 975 MG: 325 TABLET, FILM COATED ORAL at 23:23

## 2023-01-10 RX ADMIN — B-COMPLEX W/ C & FOLIC ACID TAB 1 TABLET: TAB at 09:29

## 2023-01-10 RX ADMIN — EZETIMIBE 10 MG: 10 TABLET ORAL at 09:29

## 2023-01-10 RX ADMIN — FUROSEMIDE 20 MG: 20 TABLET ORAL at 09:29

## 2023-01-10 RX ADMIN — OXYBUTYNIN CHLORIDE 5 MG: 5 TABLET ORAL at 16:51

## 2023-01-10 RX ADMIN — LIDOCAINE HYDROCHLORIDE 10 ML: 10 INJECTION, SOLUTION EPIDURAL; INFILTRATION; INTRACAUDAL; PERINEURAL at 14:56

## 2023-01-10 RX ADMIN — CEFEPIME HYDROCHLORIDE 1000 MG: 1 INJECTION, POWDER, FOR SOLUTION INTRAMUSCULAR; INTRAVENOUS at 11:40

## 2023-01-10 NOTE — [facility] ADMISSION
[facility] admissions team received referral on patient’s case for possible [facility] placement  [facility] admissions team will continue to review and follow patient’s progress and correspond with PT/OT therapies / [facility] physician and case management until a determination of acceptance to [facility] is made    Thank you, [address] Admissions

## 2023-01-10 NOTE — PROGRESS NOTES
Shannan 46  Progress Note - Zay Quincy 1945, 76 y o  male MRN: [id number]  Unit/Bed#: E5 -01 Encounter: [id number]  Primary Care Provider: Ascencion White MD   Date and time admitted to hospital: 1/7/2023 10:17 PM    * Bacteremia due to Enterobacter species  Assessment & Plan  · Patient was transferred from [facility] due to fever and positive blood cultures  · Blood culture growing Enterobacter  · No source identified, infectious disease recommending removal of Port-A-Cath, IR consulted, plan to remove tomorrow  · Continue IV Cefepime 1g BID, renally dosed, follow ID recommendations-plan to transition to Levaquin p o  at time of discharge and complete for 7 days once port can be removed  · Repeat blood cultures no growth at 48 hours  · Follow-up urine culture growing E coli-ESBL  · ID consulted appreciate recommendations  · PT/OT recommending rehab    Acute on chronic blood loss anemia  Assessment & Plan  · History of hematuria due to bladder cancer  · Hg 7 1 prior to transfer  Received 1 unit PRBC  · Hemoglobin remains low at 7 4 today  · Iron panel showing continued deficiency-unable to utilize IV iron given bacteremia  · Seen in consult by hematology oncology  · Per cardiology recommendations, given advanced CAD, will transfuse less than 8  · Plan to transfuse 1 additional unit PRBCs this evening    Nephrostomy status ([facility] [address] )  Assessment & Plan  · Chronic bilateral nephrostomy tubes  · Prior to transfer patient reported abdominal pain, CT was done and shows: Stable bilateral percutaneous nephrostomy tubes with interval placement of a right double-J nephroureteral stent  Stable mild right upper pole caliectasis, with resolution of previously seen lower pole caliectasis and hydroureter  Persistent blood   · Appreciate urology recommendations  · Monitor I&O    Elevated brain natriuretic peptide (BNP) level  Assessment & Plan  · BNP >2000   EF 60-65%, normal wall motion  · CT small stable left pleural effusion with drainage catheter in place  · Continue lasix 20 mg daily with hold parameters  · Monitor daily weight  · Repeat echocardiogram    Influenza A  Assessment & Plan  · Patient tested + for influenza A prior to discharge to [facility] on [address]  · Asymptomatic therefore not treated   · Negative flu on Jan 7    Symptomatic hypotension  Assessment & Plan  · Patient had episode of unresponsiveness with hypotension on Jan 2  He was sent from [facility] to ER  · Noted with anemia  Received IV bolus in ED and 2u PRBC during prior admission  Improved and medically cleared for return to [facility]   · BP well controlled here  · Close BP monitoring    Chronic pleural effusion  Assessment & Plan  · Chronic pleural effusion  CT showing Stable small left pleural effusion with drainage catheter in place  Stable epicardial metastasis    Stage 4 chronic kidney disease [facility])  Assessment & Plan  Lab Results   Component Value Date    EGFR 19 01/10/2023    EGFR 18 01/09/2023    EGFR 17 01/08/2023    CREATININE 2 95 (H) 01/10/2023    CREATININE 3 11 (H) 01/09/2023    CREATININE 3 18 (H) 01/08/2023     · Chronic CKD 4 at baseline  Monitor BMP    Continuous opioid dependence (HCC)  Assessment & Plan  · Maintained on oxycodone 10mg TID prn, verified on PDMP    Gross hematuria  Assessment & Plan  · Patient has persistent hematuria due to bladder cancer  History of recurrent UTI on suppressive Bactrim  · CT A/P: Stable bladder wall thickening, compatible with known malignancy  Increased hyperdensity within the bladder lumen, compatible with new blood clot  · Recent admission at [facility] 12/12-12/22 for hematuria and obstructive uropathy in the setting of known bladder cancer s/p BCG and subsequent chemotherapy/radiation s/p bilateral chronic PCN tubes   Noted to have new right-sided hydronephrosis on CT s/p cystoscopy, clot evacuation w/ right ureteral stenting on 12/14 by Dr Alfred Calvillo  · UA bloody, innumerable RBC/WBC, +leuks, no nitrites  · Continue hand irrigation as needed  · CBI clamped this morning, urine remains clear this afternoon  · Monitor I&O  · Continue monitoring hemoglobin and transfuse less than 8  · Urine culture growing ESBL E  coli  · Urology consulted appreciate recommendations    Malignant neoplasm of anterior wall of urinary bladder ([facility] [address] )  Assessment & Plan  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, recurrence with CIS in 2016 BCG again  BCG refractory disease with pelvic metastases now s/p chemoradiation 2021, on current immunotherapy  · Chronic B/L PCN tubes  · CT A/P Jan 7: Stable bladder wall thickening, compatible with known malignancy  Stable metastatic retroperitoneal lymphadenopathy  Stable epicardial metastasis  · Per oncology note on Jan 6: Chemotherapy treatment should remain on hold for now untill he is clinically stable/discharged; particularly since he is positive flu  To follow-up with primary oncologist after discharge for reevaluation prior to resume treatment    · We will need to remove Port-A-Cath given bacteremia without known source-IR to remove tomorrow-oncology aware  · IR consulted for bilateral PCN tube exchange today    Type 2 diabetes mellitus, with long-term current use of insulin (HCC)  Assessment & Plan  Lab Results   Component Value Date    HGBA1C 7 2 (H) 10/20/2022     · Currently receiving Lantus 15 units with sliding scale TID  · ADA diet  · Family reporting allergy to Humalog, will discontinue and utilize Lantus only    Recent Labs     01/09/23  1556 01/09/23  2120 01/10/23  0927 01/10/23  1103   POCGLU 166* 126 161* 111       Blood Sugar Average: Last 72 hrs:  (P) 146 [id number]    Coronary artery disease of native artery of native heart with stable angina pectoris (HCC)  Assessment & Plan  · Outpatient regimen ASA, Imdur, metoprolol, zetia  · Repeat echocardiogram pending        VTE Pharmacologic Prophylaxis: VTE Score: 6 High Risk (Score >/= 5) - Pharmacological DVT Prophylaxis Contraindicated  Sequential Compression Devices Ordered  Patient Centered Rounds: I performed bedside rounds with nursing staff today  Discussions with Specialists or Other Care Team Provider: Urology, infectious disease    Education and Discussions with Family / Patient: Updated  (Granddaughter Keyanna Styles) via phone  Time Spent for Care: 60 minutes  More than 50% of total time spent on counseling and coordination of care as described above  Current Length of Stay: 3 day(s)  Current Patient Status: Inpatient   Certification Statement: The patient will continue to require additional inpatient hospital stay due to Bacteremia due to Enterobacter requiring removal of port and IV antibiotics, hematuria requiring continue monitoring  Discharge Plan: Anticipate discharge in 48-72 hrs to rehab facility  Code Status: Level 1 - Full Code    Subjective:   Patient reports he is doing okay today  Notes his urine has improved  Agreeable for PCN tube exchange today  Unhappy to hear that his port needs to be pulled but agreeable and understanding  Objective:     Vitals:   Temp (24hrs), Av 8 °F (36 6 °C), Min:97 7 °F (36 5 °C), Max:98 2 °F (36 8 °C)    Temp:  [97 7 °F (36 5 °C)-98 2 °F (36 8 °C)] 97 8 °F (36 6 °C)  HR:  [66-82] 75  Resp:  [12-18] 12  BP: (110-143)/(65-76) 121/73  SpO2:  [94 %-98 %] 98 %  Body mass index is 24 18 kg/m²  Input and Output Summary (last 24 hours): Intake/Output Summary (Last 24 hours) at 1/10/2023 1407  Last data filed at 1/10/2023 1244  Gross per 24 hour   Intake 20 ml   Output 25508 ml   Net -07055 ml       Physical Exam:   Physical Exam  Vitals reviewed  Constitutional:       General: He is not in acute distress  HENT:      Head: Normocephalic and atraumatic  Eyes:      General: No scleral icterus       Conjunctiva/sclera: Conjunctivae normal    Cardiovascular:      Rate and Rhythm: Normal rate and regular rhythm  Heart sounds: No murmur heard  Pulmonary:      Effort: Pulmonary effort is normal  No respiratory distress  Breath sounds: Normal breath sounds  Abdominal:      General: Bowel sounds are normal  There is no distension  Palpations: Abdomen is soft  Tenderness: There is no abdominal tenderness  Genitourinary:     Comments: Shirley catheter draining peach urine  Musculoskeletal:      Cervical back: Neck supple  Right lower leg: No edema  Left lower leg: No edema  Skin:     General: Skin is warm and dry  Neurological:      Mental Status: He is alert and oriented to person, place, and time  Psychiatric:         Mood and Affect: Mood normal          Behavior: Behavior normal           Additional Data:     Labs:  Results from last 7 days   Lab Units 01/10/23  0503 01/09/23  0514   WBC Thousand/uL 11 60* 9 47   HEMOGLOBIN g/dL 7 4* 7 3*   HEMATOCRIT % 23 2* 22 6*   PLATELETS Thousands/uL 192 180   NEUTROS PCT %  --  59   LYMPHS PCT %  --  24   MONOS PCT %  --  7   EOS PCT %  --  9*     Results from last 7 days   Lab Units 01/10/23  0503 01/08/23  0539 01/07/23  1613   SODIUM mmol/L 138   < > 135   POTASSIUM mmol/L 4 5   < > 5 5*   CHLORIDE mmol/L 105   < > 101   CO2 mmol/L 26   < > 25   BUN mg/dL 48*   < > 57*   CREATININE mg/dL 2 95*   < > 3 21*   ANION GAP mmol/L 7   < > 9   CALCIUM mg/dL 8 3   < > 8 9   ALBUMIN g/dL  --   --  3 4*   TOTAL BILIRUBIN mg/dL  --   --  0 63   ALK PHOS U/L  --   --  96   ALT U/L  --   --  24   AST U/L  --   --  34   GLUCOSE RANDOM mg/dL 149*   < > 207*    < > = values in this interval not displayed       Results from last 7 days   Lab Units 01/07/23  1613   INR  0 90     Results from last 7 days   Lab Units 01/10/23  1103 01/10/23  0927 01/09/23  2120 01/09/23  1556 01/09/23  1124 01/09/23  0721 01/08/23  2037 01/08/23  1631 01/08/23  1132 01/08/23  0721 01/07/23  2357 01/04/23  1131   POC GLUCOSE mg/dl 111 161* 126 166* 207* 78 238* 124 128 95 174* 100         Results from last 7 days   Lab Units 01/07/23  1613 01/07/23  1114 01/06/23  0704   LACTIC ACID mmol/L 1 4  --   --    PROCALCITONIN ng/ml  --  0 65* 0 47*       Lines/Drains:  Invasive Devices     Central Venous Catheter Line  Duration           Port A Cath Right Chest -- days          Peripheral Intravenous Line  Duration           Peripheral IV 01/02/23 Left Antecubital 8 days    Peripheral IV 01/07/23 Left Antecubital 2 days          Drain  Duration           Nephrostomy Left 10 2 Fr  56 days    Nephrostomy Right 10 2 Fr  56 days    Urethral Catheter Three way 20 Fr  2 days              Urinary Catheter:  Goal for removal: Urologic requirement         Central Line:  Goal for removal: Plan for removal tomorrow             Imaging: No pertinent imaging reviewed  Recent Cultures (last 7 days):   Results from last 7 days   Lab Units 01/07/23  1653 01/07/23  1613 01/07/23  1611 01/06/23  2030 01/06/23  1530   BLOOD CULTURE   --  No Growth at 48 hrs  No Growth at 48 hrs   Enterobacter cloacae*  --    GRAM STAIN RESULT   --   --   --  Gram negative rods*  --    URINE CULTURE  >100,000 cfu/ml Escherichia coli ESBL*  --   --   --  >100,000 cfu/ml - Presumptive Stenotrophomonas maltophilia*  50,000-59,000 cfu/ml Candida albicans*  >100,000 cfu/ml Pseudomonas putida group*       Last 24 Hours Medication List:   Current Facility-Administered Medications   Medication Dose Route Frequency Provider Last Rate   • acetaminophen  975 mg Oral [facility] Valeda Boas, PA-C     • ALPRAZolam  0 25 mg Oral BID PRN EVANGELINA Tracy     • aluminum-magnesium hydroxide-simethicone  30 mL Oral Q6H PRN EVANGELINA Tracy     • ARIPiprazole  2 mg Oral Daily EVANGELINA Tracy     • azelastine  1 spray Each Nare BID PRN EVANGELINA Tracy     • bimatoprost  1 drop Both Eyes HS EVANGELINA Tracy     • calcitriol  0 25 mcg Oral Daily EVANGELINA Tracy     • cefepime  1,000 mg Intravenous Q12H Caryle Fent, CRNP 1,000 mg (01/10/23 1140)   • cyanocobalamin  1,000 mcg Oral Daily Caryle Fent, CRNP     • ezetimibe  10 mg Oral Daily Caryle Fent, CRNP     • furosemide  20 mg Oral Daily Caryle Fent, CRNP     • gabapentin  300 mg Oral HS Caryle Fent, CRNP     • HYDROmorphone  0 2 mg Intravenous 4x Daily PRN Caryle Fent, CRNP     • insulin glargine  15 Units Subcutaneous HS Lorna Germain PA-C     • metoprolol tartrate  25 mg Oral Q12H [address] [facility], CRNP     • multivitamin stress formula  1 tablet Oral Daily Caryle Fent, CRNP     • ondansetron  4 mg Intravenous Q6H PRN Caryle Fent, CRNP     • oxybutynin  5 mg Oral TID Tereza Redmond, CRNP     • oxyCODONE  10 mg Oral TID PRN Caryle Fent, CRNP     • pantoprazole  20 mg Oral Early Morning Caryle Fent, CRNP     • polyethylene glycol  17 g Oral Daily PRN Caryle Fent, CRNP     • senna  2 tablet Oral BID PRN Caryle Fent, CRNP     • simethicone  80 mg Oral 4x Daily PRN Caryle Fent, CRNP          Today, Patient Was Seen By: Denita Salvador PA-C    **Please Note: This note may have been constructed using a voice recognition system  **

## 2023-01-10 NOTE — RESTORATIVE TECHNICIAN NOTE
Restorative Technician Note      Patient Name: Lucila Lee     Restorative Tech Visit Date: 01/10/23  Note Type: Mobility  Patient Position Upon Consult: Seated edge of bed  Assistive Device: Roller walker  Patient Position at End of Consult: Bedside chair;  All needs within reach; Bed/Chair alarm activated    ns

## 2023-01-10 NOTE — QUICK NOTE
Afternoon re-evaluation  Shirley catheter patent draining clear light peach colored urine off CBI  Continue manual irrigation Q4 overnight with CBI clamped  Can irrigate sooner than Q4 hours if concerned for catheter patency/clot obstruction  Bilateral PCN exchange with IR today  Will re-evaluate urine in morning       Will Leigh PA-C

## 2023-01-10 NOTE — ASSESSMENT & PLAN NOTE
· Patient was transferred from [facility] due to fever and positive blood cultures  · Blood culture growing Enterobacter  · No source identified, infectious disease recommending removal of Port-A-Cath, IR consulted, plan to remove tomorrow  · Continue IV Cefepime 1g BID, renally dosed, follow ID recommendations-plan to transition to Levaquin p o  at time of discharge and complete for 7 days once port can be removed  · Repeat blood cultures no growth at 48 hours  · Follow-up urine culture growing E coli-ESBL  · ID consulted appreciate recommendations  · PT/OT recommending rehab

## 2023-01-10 NOTE — TELEPHONE ENCOUNTER
Pt currently admitted to hospital   Upon discharge will need appt for 3 month fu stent exchange per Dr Kristen Hebert  Last exchange was performed by Dr Kristen Hebert on 12/14/22  He is a patient of Dr Laura Mensah

## 2023-01-10 NOTE — ASSESSMENT & PLAN NOTE
Lab Results   Component Value Date    EGFR 19 01/10/2023    EGFR 18 01/09/2023    EGFR 17 01/08/2023    CREATININE 2 95 (H) 01/10/2023    CREATININE 3 11 (H) 01/09/2023    CREATININE 3 18 (H) 01/08/2023     · Chronic CKD 4 at baseline    Monitor BMP

## 2023-01-10 NOTE — CONSULTS
e-Consult (IPC)  - Interventional Radiology  Hien Albrecht 76 y o  male MRN: [id number]  Unit/Bed#: E5 -01 Encounter: [id number]          Interventional Radiology has been consulted to evaluate Hien Albrecht    We were consulted by internal medicine concerning this patient with bacteremia  IR Consult-(For Para, Thora, LP, PICC(for GFR>/=45) use the specific ordersets  Consult performed by: Candelaria Carranza PA-C  Consult ordered by: Inessa Novak PA-C        01/10/23    Assessment/Recommendation:   Patient is a 75 y/o male with PMH DM, HTN, HLD, CKD, anemia,  metastatic high-grade treatment refractory bladder cancer, s/p chemoradiation 2021, currently on immunotherapy, b/l ureteral obstruction with b/l PCNs and right ureteral stent placed 12/14, wagner catheter with CBI, and left chest tube for chronic pleural effusion  Recent Flu A positive on 1/3, test from 1/7 negative  Patient currently on IV cefepime as per ID  We were consulted for bilateral PCNs and port assessment     -Case discussed with Dr Cole Meraz, David Albarran, ID, and Oncology  -Patient with bacteremia that was initially suspected to be from urine infection, but recent urine culture with E  Coli, while prior blood culture from 1/6 with Enterobacter, blood cx from 1/7 negative so far  Since cultures do not match, cannot rule out port as source  ID requesting port removal and PCN exchange for source control  -Oncology ok with removal of port as patient does not currently need chemo and they are ok with reassessing future port requirement outpatient  SLIM states patient has useable peripheral access    -Plan for b/l PCN exchange today pending IR schedule and plan for port removal tomorrow as patient is not NPO today  -All above treatment teams aware of plan and agreeable  -Please make patient NPO tonight at midnight in preparation for tomorrow's procedure  Does not need to be NPO today   Orders placed  -Remainder of care as per primary team  -Please reach out to IR with any questions/concerns    11-20 minutes, >50% of the total time devoted to medical consultative verbal/EMR discussion between providers  Written report will be generated in the EMR  Thank you for allowing Interventional Radiology to participate in the care of Desiree Swain  Please don't hesitate to call or TigerText us with any questions       Lacho Saba PA-C

## 2023-01-10 NOTE — ASSESSMENT & PLAN NOTE
· Patient had episode of unresponsiveness with hypotension on Jan 2  He was sent from [facility] to ER  · Noted with anemia  Received IV bolus in ED and 2u PRBC during prior admission    Improved and medically cleared for return to [facility]   · BP well controlled here  · Close BP monitoring

## 2023-01-10 NOTE — PROGRESS NOTES
Progress Note - [facility] Infectious Disease   Jose Jiménez 76 y o  male MRN: [id number]  Unit/Bed#: E5 -01 Encounter: [id number]      IMPRESSION & RECOMMENDATIONS:   1   Enterobacter cloacae bacteremia   1 of 2 blood culture sets collected 1/6/2023 showed growth of Enterobacter cloacae   Concern for a urinary source of infection in the setting of bladder cancer with bilateral PCNs and right ureteral stent in place, and intermittent hematuria  CT A/P with known malignancy, no hydronephrosis, blood products in the bladder and collecting system   Recent urine culture showed growth of Enterobacter   The patient does have a right chest wall Port-A-Cath in place and left pleural catheter, but higher concern for urinary source of infection despite urine culture with ESBL E  Coli  Given however that the urine cultures do not match the patient's blood cultures cannot be certain that his infection may have come from his port   -continue IV cefepime for now  -once port removed, can transition to PO Levaquin 750mg q48 for 7 days through 1/17/23  -monitor CBCD and BMP  -B/L PCN exchange today  -plan for port removal tomorrow   -continue follow up with urology     2   Metastatic high-grade treatment refractory bladder cancer s/p chemoradiation 2021  Patient is currently on immunotherapy with Enfortumab  Complicated by malignant bilateral ureteral obstruction s/p bilateral percutaneous nephrostomies and right ureteral stent placement  Patient has left pleural catheter in place  Patient has been seen inpatient by urology and oncology   -continue follow up with urology  -continue follow up with oncology      3   CKD stage IV   Creatinine currently at baseline   -monitor creatinine  -dose adjust antibiotic for renal function as needed     4   Acute on chronic blood loss anemia   Patient has history of recurrent hematuria due to bladder cancer   Requires intermittent blood transfusions    -monitor CBCD  -transfusion support per primary/ongolocy     5   Influenza A   Positive on 1/3/2023, asymptomatic   Repeat testing here was negative   -maintain droplet precaution x7 days     6   Type 2 diabetes mellitus with long-term use of insulin  Patient's last HbA1c was 7 2% on 10/20/2022    -blood glucose management per primary service    Antibiotics:  Cefepime 4  abx 4    I have discussed the above management plan in detail with patient  I have discussed the above management plan in detail with patient's RN, and the primary service, SLIM AP  Subjective:  Patient has no fever, chills, sweats; no nausea, vomiting, diarrhea; no cough, shortness of breath; no pain  Not thrilled about having port removed but is agreeable  States urine is much less bloody  Objective:  Vitals:  Temp:  [97 7 °F (36 5 °C)-98 2 °F (36 8 °C)] 97 8 °F (36 6 °C)  HR:  [66-82] 75  Resp:  [12-18] 12  BP: (110-143)/(65-76) 121/73  SpO2:  [94 %-98 %] 98 %  Temp (24hrs), Av 8 °F (36 6 °C), Min:97 7 °F (36 5 °C), Max:98 2 °F (36 8 °C)  Current: Temperature: 97 8 °F (36 6 °C)    PHYSICAL EXAM:  General Appearance:  Appearing well, nontoxic, and in no distress  Chronically ill-appearing  HEENT: Normocephalic, without obvious abnormality, atraumatic  Conjunctiva pink and sclera anicteric  Oropharynx moist without lesions  Lungs:   Clear to auscultation bilaterally, respirations unlabored on room air   Heart:  RRR; no murmur, rub or gallop   Abdomen:   Soft, non-tender, non-distended, positive bowel sounds    Extremities: No cyanosis, clubbing or edema   Musculoskeletal:  Bilateral PCNs intact  : Shirley intact to CBI urine output pink   Skin: No rashes or lesions  No draining wounds noted  Right chest port intact  Peripheral IV intact without evidence of erythema, warmth, or exudate  LABS, IMAGING, & OTHER STUDIES:  Lab Results:  I have personally reviewed pertinent labs    Results from last 7 days   Lab Units 01/10/23  0503 23  8342 01/08/23  0539   WBC Thousand/uL 11 60* 9 47 7 39   HEMOGLOBIN g/dL 7 4* 7 3* 7 7*   PLATELETS Thousands/uL 192 180 157     Results from last 7 days   Lab Units 01/10/23  0503 01/09/23  0514 01/08/23  0539 01/07/23  1613 01/07/23  1114 01/06/23  0704   SODIUM mmol/L 138 139 137 135 135 137   POTASSIUM mmol/L 4 5 4 5 4 8 5 5* 4 7 4 5   CHLORIDE mmol/L 105 106 103 101 103 103   CO2 mmol/L 26 26 25 25 26 27   BUN mg/dL 48* 47* 53* 57* 52* 51*   CREATININE mg/dL 2 95* 3 11* 3 18* 3 21* 3 21* 3 07*   EGFR ml/min/1 73sq m 19 18 17 17 17 18   CALCIUM mg/dL 8 3 7 8* 8 4 8 9 8 7 8 6   AST U/L  --   --   --  34 27 42   ALT U/L  --   --   --  24 23 29   ALK PHOS U/L  --   --   --  96 92 106     Results from last 7 days   Lab Units 01/07/23  1653 01/07/23  1613 01/07/23  1611 01/06/23  2030 01/06/23  1530   BLOOD CULTURE   --  No Growth at 48 hrs  No Growth at 48 hrs  Enterobacter cloacae*  --    GRAM STAIN RESULT   --   --   --  Gram negative rods*  --    URINE CULTURE  >100,000 cfu/ml Escherichia coli ESBL*  --   --   --  Culture results to follow       Results from last 7 days   Lab Units 01/07/23  1114 01/06/23  0704   PROCALCITONIN ng/ml 0 65* 0 47*         Results from last 7 days   Lab Units 01/07/23  1613   FERRITIN ng/mL 217

## 2023-01-10 NOTE — ASSESSMENT & PLAN NOTE
Lab Results   Component Value Date    HGBA1C 7 2 (H) 10/20/2022     · Currently receiving Lantus 15 units with sliding scale TID  · ADA diet  · Family reporting allergy to Humalog, will discontinue and utilize Lantus only    Recent Labs     01/09/23  1556 01/09/23 2120 01/10/23  0927 01/10/23  1103   POCGLU 166* 126 161* 111       Blood Sugar Average: Last 72 hrs:  (P) 988 [id number]

## 2023-01-10 NOTE — PROGRESS NOTES
Progress Note - Urology  Rebecca Gilman 1945, 76 y o  male MRN: [id number]    Unit/Bed#: E5 -01 Encounter: [id number]    Assessment/Plan:  1  High grade treatment refractory bladder cancer from 1995 to current, several intravesical therapies in those decades, now s/p chemoradiation 2021; malignant bilateral ureteral obstruction managed with bilateral nephrostomy tubes in addition to right ureteral stent for upper tract bleeding tamponade attempt new stent from 12/14/22  Plan for exchange in 3 months with possible TURBT at that time  On immunotherapy managed by medical oncology  IR plans to perform b/l PCN exchanges today while on antibiotics; last exchange 11/15/22      2  Symptomatic anemia with near syncope- resolved s/p blood transfusion 1/2/23; BP remains stable in the 120s/70s  Denies dizziness  Hgb 7 4     3  Enterobacter cloacae bacteremia- etiology possible port source given recent urine culture growing Ecoli  Plans for port removal with IR  Continue medical optimization, symptom management, and antibiotics       4  Gross hematuria- much improved; both nephrostomy tubes are draining clear yellow urine today  Urethral catheter patent draining clear yellow urine on moderate CBI  CBI clamped, will re-evaluate later today  Continue q4h hand irrigation or sooner if needed for tube patency  Continue home rx ditropan 5mg TID for bladder spasm      5  Influenza A positive- per primary medical team      Subjective:   HPI: Patient reports feeling better today  Denies any pain  Offers no complaints  Review of Systems   Constitutional: Negative for chills and fever  HENT: Negative  Respiratory: Negative  Cardiovascular: Negative  Gastrointestinal: Negative for abdominal pain, nausea and vomiting  Genitourinary: Negative for flank pain, hematuria, penile pain, scrotal swelling and testicular pain  Musculoskeletal: Negative  Skin: Negative      Neurological: Negative for dizziness and light-headedness  Objective:  Nursing Rounds: Plan discussed with Danyel Mendoza RN  Communication: Plan discussed with [facility] provider - Tammie Stiles PA-C  Vitals: Blood pressure 123/69, pulse 73, temperature 97 8 °F (36 6 °C), temperature source Oral, resp  rate 12, weight 90 1 kg (198 lb 10 2 oz), SpO2 96 %  ,Body mass index is 24 18 kg/m²  Physical Exam  Vitals reviewed  Constitutional:       General: He is not in acute distress  Appearance: Normal appearance  He is not ill-appearing, toxic-appearing or diaphoretic  HENT:      Head: Normocephalic and atraumatic  Eyes:      Conjunctiva/sclera: Conjunctivae normal    Cardiovascular:      Rate and Rhythm: Normal rate and regular rhythm  Heart sounds: Normal heart sounds  Pulmonary:      Effort: Pulmonary effort is normal  No respiratory distress  Abdominal:      General: There is no distension  Palpations: Abdomen is soft  Tenderness: There is no abdominal tenderness  There is no right CVA tenderness, left CVA tenderness, guarding or rebound  Comments: B/l PCNs patent, draining clear yellow urine  Genitourinary:     Comments: Shirley catheter patent, draining clear yellow urine on CBI  CBI clamped  Musculoskeletal:         General: Normal range of motion  Cervical back: Normal range of motion  Skin:     General: Skin is warm and dry  Neurological:      General: No focal deficit present  Mental Status: He is alert and oriented to person, place, and time  Psychiatric:         Mood and Affect: Mood normal          Behavior: Behavior normal          Thought Content: Thought content normal          Judgment: Judgment normal        Imaging:  No new imaging       Labs:  Recent Labs     01/07/23  1114 01/07/23  1613 01/07/23  1907 01/08/23  0539 01/09/23  0514 01/10/23  0503   WBC 9 79 9 17 9 78 7 39 9 47 11 60*     Recent Labs     01/07/23  1114 01/07/23  1613 01/07/23  1907 01/08/23  0539 01/09/23  1149 01/10/23  0503   HGB 7 5* 8 3* 7 1* 7 7* 7 3* 7 4*     Recent Labs     01/07/23  1114 01/07/23  1613 01/07/23  1907 01/08/23  0539 01/09/23  0514 01/10/23  0503   HCT 23 9* 26 0* 22 3* 23 6* 22 6* 23 2*     Recent Labs     01/07/23  1114 01/07/23  1613 01/08/23  0539 01/09/23  0514 01/10/23  0503   CREATININE 3 21* 3 21* 3 18* 3 11* 2 95*     Urine Culture >100,000 cfu/ml Escherichia coli ESBL Abnormal     An Extended-Spectrum Beta-Lactamase is being produced by this organism (requires contact precautions)  Cephalosporins are NOT effective for treating these organisms  For SEVERE infections (i e  bacteremia, septic shock, etc ), Carbapenems are the drug of choice  For MILD infections (i e  isolated urinary or biliary infection), high-dose Zosyn may be used  This organism has been edited  The previous result was Klebsiella-Enterobacter  group on 1/9/2023 at 1151 EST  This organism has been edited  The previous result was Escherichia coli on 1/9/2023 at 1409 EST  Susceptibility     Escherichia coli ESBL     ERIC (Preliminary)     Amoxicillin + Clavulanate >16/8 ug/ml Resistant     Ampicillin ($$) >16 00 ug/ml Resistant     Ampicillin + Sulbactam ($) >16/8 ug/ml Resistant     Aztreonam ($$$)  >16 ug/ml Resistant     Cefazolin ($) >16 00 ug/ml Resistant     Cefepime ($) >16 00 ug/ml Resistant     Ceftazidime ($$) >16 ug/ml Resistant     Ceftriaxone ($$) >32 00 ug/ml Resistant     Cefuroxime ($$) >16 ug/ml Resistant     Ciprofloxacin ($)  <=0 25 ug/ml Susceptible     Ertapenem ($$$) <=0 5 ug/ml Susceptible     Gentamicin ($$) <=2 ug/ml Susceptible     Levofloxacin ($) <=0 50 ug/ml Susceptible     Nitrofurantoin <=32 ug/ml Susceptible     Piperacillin + Tazobactam ($$$) <=8 ug/ml Susceptible     Tetracycline <=4 ug/ml Susceptible     Tobramycin ($) <=2 ug/ml Susceptible     Trimethoprim + Sulfamethoxazole ($$$) <=0 5/9 5 u   Susceptible                  Specimen Collected: 01/07/23 16:53 Last Resulted: 01/10/23 10:18           History:  Past Medical History:   Diagnosis Date   • Anemia     Last assessed: 17   • Anxiety    • Arteriosclerotic cardiovascular disease     Last assessed: 17   • Arthritis    • Bladder cancer ([facility] )     bladder- had BCG treatments   • Chronic kidney disease     Stage IV   • CKD (chronic kidney disease) stage 4, GFR 15-29 ml/min ([facility])    • Colon polyp    • Coronary artery disease     7 stents   • Depression    • Diabetes mellitus ([facility])     IDDM   • GERD (gastroesophageal reflux disease)    • Glaucoma    • Hematuria    • History of fusion of cervical spine    • Hyperlipidemia    • Hypertension    • Insomnia     Last assessed: 12   • Loss of hearing     has hearing aids but usually does not wear them   • Lung cancer ([facility] )    • Metastatic cancer ([place] [address] )    • Other seasonal allergic rhinitis     Last assessed: 2/10/16   • PAD (peripheral artery disease) ([facility])    • Shortness of breath     on exertion   • Spinal stenosis of lumbar region    • Transient cerebral ischemia     No Residual   • Uses walker     w/c for longer distances     Social History     Socioeconomic History   • Marital status: /Civil Union     Spouse name: Not on file   • Number of children: Not on file   • Years of education: Not on file   • Highest education level: Not on file   Occupational History   • Not on file   Tobacco Use   • Smoking status: Former     Packs/day: 3 00     Years: 27 00     Pack years: 81 00     Types: Cigarettes     Quit date: 1970     Years since quittin 0   • Smokeless tobacco: Never   Vaping Use   • Vaping Use: Never used   Substance and Sexual Activity   • Alcohol use: Never     Comment: beer / liquor   • Drug use: Not Currently     Types: Marijuana     Comment: quit 2019 had medical marijuana   • Sexual activity: Not Currently   Other Topics Concern   • Not on file   Social History Narrative    Consumes 1 cup of coffee and 1 soda per day     Social Determinants of Health Financial Resource Strain: Not on file   Food Insecurity: No Food Insecurity   • Worried About [address] [employer] in the Last Year: Never true   • Ran Out of Food in the Last Year: Never true   Transportation Needs: No Transportation Needs   • Lack of Transportation (Medical): No   • Lack of Transportation (Non-Medical): No   Physical Activity: Not on file   Stress: Not on file   Social Connections: Not on file   Intimate Partner Violence: Not on file   Housing Stability: Low Risk    • Unable to Pay for Housing in the Last Year: No   • Number of Places Lived in the Last Year: 1   • Unstable Housing in the Last Year: No     Past Surgical History:   Procedure Laterality Date   • CARDIAC SURGERY      Cath stent placement  Last assessed: 3/9/17  Interventional Catheterization   • CHOLECYSTECTOMY     • COLONOSCOPY     • CYSTOSCOPY      Diagnostic w/biopsy  Alex Woodruff  Last assessed: 12/1/14   • CYSTOSCOPY N/A 04/12/2022    Procedure: CYSTOSCOPY  Bladder biopsies  ;  Surgeon: Gerber Rao MD;  [facility];  Service: Urology   • CYSTOSCOPY W/ RETROGRADES Right 03/01/2022    Procedure: CYSTO; stent removal retrograde;  Surgeon: Gerber Rao MD;  [facility];  Service: Urology   • CYSTOSCOPY W/ URETERAL STENT PLACEMENT Bilateral 10/18/2021    Procedure: bilateral retrogrades, cytology collection;  Surgeon: Gerber Rao MD;  [facility];  Service: Urology   • CYSTOURETHROSCOPY      w/cautery  Alex Woodruff   • FL RETROGRADE PYELOGRAM  10/18/2021   • FL RETROGRADE PYELOGRAM  10/24/2021   • FL RETROGRADE PYELOGRAM  12/14/2022   • GASTRIC BYPASS      For morbid obesity w/Shaji-en-Y   Resolved: 11/17/09   • INCISION AND DRAINAGE OF WOUND Right 02/26/2017    Procedure: INCISION AND DRAINAGE (I&D) EXTREMITY WITH APPLICATION OF GRAFT JACKET;  Surgeon: Gera Domínguez DPM;  [facility];  Service:    • INCISION AND DRAINAGE OF WOUND Right 04/25/2017    Procedure: INCISION AND DRAINAGE (I&D) EXTREMITY, APPLICATION OF GRAFT;  Surgeon: Katerin Fletcher DPM;  [facility];  Service:    • IR BIOPSY OTHER  07/02/2020   • IR LOWER EXTREMITY ANGIOGRAM  02/08/2021   • IR LOWER EXTREMITY ANGIOGRAM  02/11/2021   • IR NEPHROSTOMY TUBE CHECK/CHANGE/REPOSITION/REINSERTION/UPSIZE  04/28/2022   • IR NEPHROSTOMY TUBE CHECK/CHANGE/REPOSITION/REINSERTION/UPSIZE  05/24/2022   • IR NEPHROSTOMY TUBE CHECK/CHANGE/REPOSITION/REINSERTION/UPSIZE  06/07/2022   • IR NEPHROSTOMY TUBE CHECK/CHANGE/REPOSITION/REINSERTION/UPSIZE  07/28/2022   • IR NEPHROSTOMY TUBE CHECK/CHANGE/REPOSITION/REINSERTION/UPSIZE  11/15/2022   • IR NEPHROSTOMY TUBE PLACEMENT  02/25/2022   • IR PORT PLACEMENT  01/17/2022   • IR THORACENTESIS  09/02/2022   • IR THORACENTESIS  09/14/2022   • IR THORACENTESIS WITH TUBE PLACEMENT Left     october   • IR TUNNELED CENTRAL LINE PLACEMENT  12/24/2020   • JOINT REPLACEMENT      christofer knees replaced   • CT AMPUTATION METATARSAL W/TOE SINGLE Left 12/21/2020    Procedure: RAY RESECTION FOOT;  Surgeon: Emily Campbell DPM;  [facility];  Service: Podiatry   • CT AMPUTATION METATARSAL W/TOE SINGLE Left 12/31/2020    Procedure: 5TH MET RESECTION;  Surgeon: Emily Campbell DPM;  [facility];  Service: Podiatry   • CT CYSTO BLADDER W/URETERAL CATHETERIZATION Right 12/14/2022    Procedure: CYSTOSCOPY WITH RETROGRADE PYELOGRAM and CLOT EVACUATION, RIGHT STENT INSERTION, FULGRATION OF BLEEDING POINTS;  Surgeon: Ellen High MD;  [facility];  Service: Urology   • CT CYSTO W/IRRIG & EVAC MULTPLE OBSTRUCTING CLOTS N/A 02/10/2021    Procedure: CYSTOSCOPY EVACUATION OF CLOTS, fulguration;  Surgeon: Ayaan Rush MD;  [facility];  Service: Urology   • CT CYSTO W/IRRIG & EVAC MULTPLE OBSTRUCTING CLOTS N/A 10/24/2021    Procedure: CYSTOSCOPY EVACUATION OF CLOT, fulguration of bleeding vessels, right ureter stent placement, retrograde pyelogram;  Surgeon: Staci Garnica, MD;  [facility];  Service: Urology   • GA CYSTO W/REMOVAL OF LESIONS SMALL N/A 11/19/2020    Procedure: CYSTO W/BIOPSIES, transurethral prostate bx;  Surgeon: Leigh Granado MD;  [facility];  Service: Urology   • GA CYSTOURETHROSCOPY W/DEST &/RMVL TUMOR LARGE Bilateral 10/18/2021    Procedure: TRANSURETHRAL RESECTION OF BLADDER TUMOR (TURBT);   Surgeon: Tati Chavez MD;  [facility];  Service: Urology   • GA CYSTOURETHROSCOPY WITH BIOPSY N/A 08/16/2016    Procedure: Renetta Wolff;  Surgeon: Brandon Woods MD;  [facility];  Service: Urology   • GA [facility] 3RD+ ORD [address] PEL/TR [facility] Left 02/08/2021    Procedure: LEG angiogram, CO2 w/limited contrast with balloon angioplasty postertior tibial artery;  Surgeon: Manasa Gonzales MD;  [facility];  Service: Vascular   • ROTATOR CUFF REPAIR     • SMALL INTESTINE SURGERY      Surgery Shaji-en-Y   • SPINAL FUSION      lumbar and cervical fusions   • VAC DRESSING APPLICATION Right 54/80/1687    Procedure: APPLICATION VAC DRESSING;  Surgeon: Salo Macedo DPM;  [facility];  Service:    • WOUND DEBRIDEMENT Left 02/16/2021    Procedure: FOOT DEBRIDE, [address] OUT w/graft application;  Surgeon: Salo Macedo DPM;  [facility];  Service: Podiatry     Family History   Problem Relation Age of Onset   • Diabetes Mother    • Heart disease Mother    • Other Mother         High blood pressure   • Heart disease Father    • Diabetes Sister    • Other Sister         High blood pressure   • Kidney disease Sister    • Heart disease Brother    • Other Brother         High blood pressure       Jackie Arias [place]  Date: 1/10/2023 Time: 8:44 AM

## 2023-01-10 NOTE — SEDATION DOCUMENTATION
Patient repositioned from prone to supine, for a port removalhe was redraped in the usual sterile fashion

## 2023-01-10 NOTE — PROGRESS NOTES
Plan was to transfuse one unit PRBCs for hemoglobin 7 4 today  Per last cardiology note, would like hemoglobin >8 given severe CAD  Patient seen and examined after IR procedure today  Notes he is too tired now and would like a break  Refusing blood transfusion today  Will repeat hemoglobin and plan to transfuse tomorrow if hemoglobin remains low  Type and screen added to morning labs

## 2023-01-10 NOTE — ASSESSMENT & PLAN NOTE
· Patient tested + for influenza A prior to discharge to [facility] on [address]  · Asymptomatic therefore not treated   · Negative flu on Jan 7

## 2023-01-10 NOTE — ASSESSMENT & PLAN NOTE
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, recurrence with CIS in 2016 BCG again  BCG refractory disease with pelvic metastases now s/p chemoradiation 2021, on current immunotherapy  · Chronic B/L PCN tubes  · CT A/P Jan 7: Stable bladder wall thickening, compatible with known malignancy  Stable metastatic retroperitoneal lymphadenopathy  Stable epicardial metastasis  · Per oncology note on Jan 6: Chemotherapy treatment should remain on hold for now untill he is clinically stable/discharged; particularly since he is positive flu  To follow-up with primary oncologist after discharge for reevaluation prior to resume treatment    · We will need to remove Port-A-Cath given bacteremia without known source-IR to remove tomorrow-oncology aware  · IR consulted for bilateral PCN tube exchange today

## 2023-01-10 NOTE — ASSESSMENT & PLAN NOTE
· Patient has persistent hematuria due to bladder cancer  History of recurrent UTI on suppressive Bactrim  · CT A/P: Stable bladder wall thickening, compatible with known malignancy  Increased hyperdensity within the bladder lumen, compatible with new blood clot  · Recent admission at [facility] 12/12-12/22 for hematuria and obstructive uropathy in the setting of known bladder cancer s/p BCG and subsequent chemotherapy/radiation s/p bilateral chronic PCN tubes   Noted to have new right-sided hydronephrosis on CT s/p cystoscopy, clot evacuation w/ right ureteral stenting on 12/14 by Dr Jim Oglesby  · UA bloody, innumerable RBC/WBC, +leuks, no nitrites  · Continue hand irrigation as needed  · CBI clamped this morning, urine remains clear this afternoon  · Monitor I&O  · Continue monitoring hemoglobin and transfuse less than 8  · Urine culture growing ESBL E  coli  · Urology consulted appreciate recommendations

## 2023-01-10 NOTE — ASSESSMENT & PLAN NOTE
· History of hematuria due to bladder cancer  · Hg 7 1 prior to transfer   Received 1 unit PRBC  · Hemoglobin remains low at 7 4 today  · Iron panel showing continued deficiency-unable to utilize IV iron given bacteremia  · Seen in consult by hematology oncology  · Per cardiology recommendations, given advanced CAD, will transfuse less than 8  · Plan to transfuse 1 additional unit PRBCs this evening

## 2023-01-10 NOTE — ASSESSMENT & PLAN NOTE
· BNP >2000   EF 60-65%, normal wall motion  · CT small stable left pleural effusion with drainage catheter in place  · Continue lasix 20 mg daily with hold parameters  · Monitor daily weight  · Repeat echocardiogram

## 2023-01-10 NOTE — ASSESSMENT & PLAN NOTE
· Chronic bilateral nephrostomy tubes  · Prior to transfer patient reported abdominal pain, CT was done and shows: Stable bilateral percutaneous nephrostomy tubes with interval placement of a right double-J nephroureteral stent  Stable mild right upper pole caliectasis, with resolution of previously seen lower pole caliectasis and hydroureter    Persistent blood   · Appreciate urology recommendations  · Monitor I&O

## 2023-01-10 NOTE — PLAN OF CARE
Problem: Nutrition/Hydration-ADULT  Goal: Nutrient/Hydration intake appropriate for improving, restoring or maintaining nutritional needs  Description: Monitor and assess patient's nutrition/hydration status for malnutrition  Collaborate with interdisciplinary team and initiate plan and interventions as ordered  Monitor patient's weight and dietary intake as ordered or per policy  Utilize nutrition screening tool and intervene as necessary  Determine patient's food preferences and provide high-protein, high-caloric foods as appropriate       INTERVENTIONS:  - Monitor oral intake, urinary output, labs, and treatment plans  - Assess nutrition and hydration status and recommend course of action  - Evaluate amount of meals eaten  - Assist patient with eating if necessary   - Allow adequate time for meals  - Recommend/ encourage appropriate diets, oral nutritional supplements, and vitamin/mineral supplements  - Order, calculate, and assess calorie counts as needed  - Recommend, monitor, and adjust tube feedings and TPN/PPN based on assessed needs  - Assess need for intravenous fluids  - Provide specific nutrition/hydration education as appropriate  - Include patient/family/caregiver in decisions related to nutrition  Outcome: Progressing     Problem: Prexisting or High Potential for Compromised Skin Integrity  Goal: Skin integrity is maintained or improved  Description: INTERVENTIONS:  - Identify patients at risk for skin breakdown  - Assess and monitor skin integrity  - Assess and monitor nutrition and hydration status  - Monitor labs   - Assess for incontinence   - Turn and reposition patient  - Assist with mobility/ambulation  - Relieve pressure over bony prominences  - Avoid friction and shearing  - Provide appropriate hygiene as needed including keeping skin clean and dry  - Evaluate need for skin moisturizer/barrier cream  - Collaborate with interdisciplinary team   - Patient/family teaching  - Consider wound care consult   Outcome: Progressing     Problem: MOBILITY - ADULT  Goal: Maintain or return to baseline ADL function  Description: INTERVENTIONS:  -  Assess patient's ability to carry out ADLs; assess patient's baseline for ADL function and identify physical deficits which impact ability to perform ADLs (bathing, care of mouth/teeth, toileting, grooming, dressing, etc )  - Assess/evaluate cause of self-care deficits   - Assess range of motion  - Assess patient's mobility; develop plan if impaired  - Assess patient's need for assistive devices and provide as appropriate  - Encourage maximum independence but intervene and supervise when necessary  - Involve family in performance of ADLs  - Assess for home care needs following discharge   - Consider OT consult to assist with ADL evaluation and planning for discharge  - Provide patient education as appropriate  Outcome: Progressing     Problem: Potential for Falls  Goal: Patient will remain free of falls  Description: INTERVENTIONS:  - Educate patient/family on patient safety including physical limitations  - Instruct patient to call for assistance with activity   - Consult OT/PT to assist with strengthening/mobility   - Keep Call bell within reach  - Keep bed low and locked with side rails adjusted as appropriate  - Keep care items and personal belongings within reach  - Initiate and maintain comfort rounds  - Make Fall Risk Sign visible to staff  - Apply yellow socks and bracelet for high fall risk patients  - Consider moving patient to room near nurses station  Outcome: Progressing     Problem: PAIN - ADULT  Goal: Verbalizes/displays adequate comfort level or baseline comfort level  Description: Interventions:  - Encourage patient to monitor pain and request assistance  - Assess pain using appropriate pain scale  - Administer analgesics based on type and severity of pain and evaluate response  - Implement non-pharmacological measures as appropriate and evaluate response  - Consider cultural and social influences on pain and pain management  - Notify physician/advanced practitioner if interventions unsuccessful or patient reports new pain  Outcome: Progressing     Problem: INFECTION - ADULT  Goal: Absence or prevention of progression during hospitalization  Description: INTERVENTIONS:  - Assess and monitor for signs and symptoms of infection  - Monitor lab/diagnostic results  - Monitor all insertion sites, i e  indwelling lines, tubes, and drains  - Monitor endotracheal if appropriate and nasal secretions for changes in amount and color  - [place] appropriate cooling/warming therapies per order  - Administer medications as ordered  - Instruct and encourage patient and family to use good hand hygiene technique  - Identify and instruct in appropriate isolation precautions for identified infection/condition  Outcome: Progressing     Problem: SAFETY ADULT  Goal: Maintain or return to baseline ADL function  Description: INTERVENTIONS:  -  Assess patient's ability to carry out ADLs; assess patient's baseline for ADL function and identify physical deficits which impact ability to perform ADLs (bathing, care of mouth/teeth, toileting, grooming, dressing, etc )  - Assess/evaluate cause of self-care deficits   - Assess range of motion  - Assess patient's mobility; develop plan if impaired  - Assess patient's need for assistive devices and provide as appropriate  - Encourage maximum independence but intervene and supervise when necessary  - Involve family in performance of ADLs  - Assess for home care needs following discharge   - Consider OT consult to assist with ADL evaluation and planning for discharge  - Provide patient education as appropriate  Outcome: Progressing  Goal: Patient will remain free of falls  Description: INTERVENTIONS:  - Educate patient/family on patient safety including physical limitations  - Instruct patient to call for assistance with activity   - Consult OT/PT to assist with strengthening/mobility   - Keep Call bell within reach  - Keep bed low and locked with side rails adjusted as appropriate  - Keep care items and personal belongings within reach  - Initiate and maintain comfort rounds  - Make Fall Risk Sign visible to staff  - Apply yellow socks and bracelet for high fall risk patients  - Consider moving patient to room near nurses station  Outcome: Progressing     Problem: GENITOURINARY - ADULT  Goal: Maintains or returns to baseline urinary function  Description: INTERVENTIONS:  - Assess urinary function  - Encourage oral fluids to ensure adequate hydration if ordered  - Administer IV fluids as ordered to ensure adequate hydration  - Administer ordered medications as needed  - Offer frequent toileting  - Follow urinary retention protocol if ordered  Outcome: Progressing  Goal: Absence of urinary retention  Description: INTERVENTIONS:  - Assess patient’s ability to void and empty bladder  - Monitor I/O  - Bladder scan as needed  - Discuss with physician/AP medications to alleviate retention as needed  - Discuss catheterization for long term situations as appropriate  Outcome: Progressing     Problem: HEMATOLOGIC - ADULT  Goal: Maintains hematologic stability  Description: INTERVENTIONS  - Assess for signs and symptoms of bleeding or hemorrhage  - Monitor labs  - Administer supportive blood products/factors as ordered and appropriate  Outcome: Progressing     Problem: MUSCULOSKELETAL - ADULT  Goal: Maintain or return mobility to safest level of function  Description: INTERVENTIONS:  - Assess patient's ability to carry out ADLs; assess patient's baseline for ADL function and identify physical deficits which impact ability to perform ADLs (bathing, care of mouth/teeth, toileting, grooming, dressing, etc )  - Assess/evaluate cause of self-care deficits   - Assess range of motion  - Assess patient's mobility  - Assess patient's need for assistive devices and provide as appropriate  - Encourage maximum independence but intervene and supervise when necessary  - Involve family in performance of ADLs  - Assess for home care needs following discharge   - Consider OT consult to assist with ADL evaluation and planning for discharge  - Provide patient education as appropriate  Outcome: Progressing

## 2023-01-11 ENCOUNTER — APPOINTMENT (OUTPATIENT)
Dept: RADIOLOGY | Facility: HOSPITAL | Age: 78
End: 2023-01-11

## 2023-01-11 ENCOUNTER — APPOINTMENT (INPATIENT)
Dept: NON INVASIVE DIAGNOSTICS | Facility: HOSPITAL | Age: 78
End: 2023-01-11

## 2023-01-11 PROBLEM — Z87.09 HISTORY OF PLEURAL EFFUSION: Status: ACTIVE | Noted: 2023-01-11

## 2023-01-11 LAB
ALBUMIN SERPL BCP-MCNC: 2.1 G/DL (ref 3.5–5)
ALP SERPL-CCNC: 126 U/L (ref 46–116)
ALT SERPL W P-5'-P-CCNC: 21 U/L (ref 12–78)
ANION GAP SERPL CALCULATED.3IONS-SCNC: 9 MMOL/L (ref 4–13)
AORTIC VALVE MEAN VELOCITY: 13.6 M/S
APICAL FOUR CHAMBER EJECTION FRACTION: 52 %
AST SERPL W P-5'-P-CCNC: 31 U/L (ref 5–45)
AV AREA BY CONTINUOUS VTI: 1.3 CM2
AV AREA PEAK VELOCITY: 1.4 CM2
AV LVOT MEAN GRADIENT: 2 MMHG
AV LVOT PEAK GRADIENT: 4 MMHG
AV MEAN GRADIENT: 8 MMHG
AV PEAK GRADIENT: 13 MMHG
AV VALVE AREA: 1.34 CM2
AV VELOCITY RATIO: 0.54
BASOPHILS # BLD AUTO: 0.05 THOUSANDS/ÂΜL (ref 0–0.1)
BASOPHILS NFR BLD AUTO: 0 % (ref 0–1)
BILIRUB SERPL-MCNC: 0.35 MG/DL (ref 0.2–1)
BUN SERPL-MCNC: 49 MG/DL (ref 5–25)
CALCIUM ALBUM COR SERPL-MCNC: 10.5 MG/DL (ref 8.3–10.1)
CALCIUM SERPL-MCNC: 9 MG/DL (ref 8.3–10.1)
CHLORIDE SERPL-SCNC: 103 MMOL/L (ref 96–108)
CO2 SERPL-SCNC: 26 MMOL/L (ref 21–32)
CREAT SERPL-MCNC: 3.02 MG/DL (ref 0.6–1.3)
DOP CALC AO PEAK VEL: 1.78 M/S
DOP CALC AO VTI: 34.1 CM
DOP CALC LVOT AREA: 2.54 CM2
DOP CALC LVOT DIAMETER: 1.8 CM
DOP CALC LVOT PEAK VEL VTI: 18.03 CM
DOP CALC LVOT PEAK VEL: 0.97 M/S
DOP CALC LVOT STROKE INDEX: 21.8 ML/M2
DOP CALC LVOT STROKE VOLUME: 45.86
E WAVE DECELERATION TIME: 141 MS
EOSINOPHIL # BLD AUTO: 0.94 THOUSAND/ÂΜL (ref 0–0.61)
EOSINOPHIL NFR BLD AUTO: 6 % (ref 0–6)
ERYTHROCYTE [DISTWIDTH] IN BLOOD BY AUTOMATED COUNT: 15.5 % (ref 11.6–15.1)
FRACTIONAL SHORTENING: 22 (ref 28–44)
GFR SERPL CREATININE-BSD FRML MDRD: 18 ML/MIN/1.73SQ M
GLUCOSE SERPL-MCNC: 164 MG/DL (ref 65–140)
GLUCOSE SERPL-MCNC: 371 MG/DL (ref 65–140)
GLUCOSE SERPL-MCNC: 81 MG/DL (ref 65–140)
GLUCOSE SERPL-MCNC: 93 MG/DL (ref 65–140)
HCT VFR BLD AUTO: 25.3 % (ref 36.5–49.3)
HCT VFR BLD AUTO: 25.3 % (ref 36.5–49.3)
HGB BLD-MCNC: 8.1 G/DL (ref 12–17)
HGB BLD-MCNC: 8.2 G/DL (ref 12–17)
IMM GRANULOCYTES # BLD AUTO: 0.08 THOUSAND/UL (ref 0–0.2)
IMM GRANULOCYTES NFR BLD AUTO: 1 % (ref 0–2)
INTERVENTRICULAR SEPTUM IN DIASTOLE (PARASTERNAL SHORT AXIS VIEW): 1.1 CM
INTERVENTRICULAR SEPTUM: 1.1 CM (ref 0.6–1.1)
LAAS-AP2: 19.1 CM2
LAAS-AP4: 24.2 CM2
LEFT ATRIUM AREA SYSTOLE SINGLE PLANE A4C: 24 CM2
LEFT ATRIUM SIZE: 3.6 CM
LEFT INTERNAL DIMENSION IN SYSTOLE: 3.9 CM (ref 2.1–4)
LEFT VENTRICLE DIASTOLIC VOLUME (MOD BIPLANE): 124 ML
LEFT VENTRICLE SYSTOLIC VOLUME (MOD BIPLANE): 57 ML
LEFT VENTRICULAR INTERNAL DIMENSION IN DIASTOLE: 5 CM (ref 3.5–6)
LEFT VENTRICULAR POSTERIOR WALL IN END DIASTOLE: 1.1 CM
LEFT VENTRICULAR STROKE VOLUME: 52 ML
LV EF: 54 %
LVSV (TEICH): 52 ML
LYMPHOCYTES # BLD AUTO: 2.54 THOUSANDS/ÂΜL (ref 0.6–4.47)
LYMPHOCYTES NFR BLD AUTO: 17 % (ref 14–44)
MCH RBC QN AUTO: 30.3 PG (ref 26.8–34.3)
MCHC RBC AUTO-ENTMCNC: 32.4 G/DL (ref 31.4–37.4)
MCV RBC AUTO: 93 FL (ref 82–98)
MONOCYTES # BLD AUTO: 0.82 THOUSAND/ÂΜL (ref 0.17–1.22)
MONOCYTES NFR BLD AUTO: 6 % (ref 4–12)
MV E'TISSUE VEL-LAT: 7 CM/S
MV E'TISSUE VEL-SEP: 6 CM/S
MV PEAK A VEL: 1.24 M/S
MV PEAK E VEL: 84 CM/S
MV STENOSIS PRESSURE HALF TIME: 41 MS
MV VALVE AREA P 1/2 METHOD: 5.37
NEUTROPHILS # BLD AUTO: 10.5 THOUSANDS/ÂΜL (ref 1.85–7.62)
NEUTS SEG NFR BLD AUTO: 70 % (ref 43–75)
NRBC BLD AUTO-RTO: 0 /100 WBCS
PLATELET # BLD AUTO: 228 THOUSANDS/UL (ref 149–390)
PMV BLD AUTO: 10.1 FL (ref 8.9–12.7)
POTASSIUM SERPL-SCNC: 5.5 MMOL/L (ref 3.5–5.3)
POTASSIUM SERPL-SCNC: 5.7 MMOL/L (ref 3.5–5.3)
PROT SERPL-MCNC: 7.2 G/DL (ref 6.4–8.4)
RBC # BLD AUTO: 2.71 MILLION/UL (ref 3.88–5.62)
RIGHT ATRIUM AREA SYSTOLE A4C: 17.1 CM2
RIGHT VENTRICLE ID DIMENSION: 5 CM
SL CV LEFT ATRIUM LENGTH A2C: 5.1 CM
SL CV PED ECHO LEFT VENTRICLE DIASTOLIC VOLUME (MOD BIPLANE) 2D: 118 ML
SL CV PED ECHO LEFT VENTRICLE SYSTOLIC VOLUME (MOD BIPLANE) 2D: 66 ML
SODIUM SERPL-SCNC: 138 MMOL/L (ref 135–147)
TR MAX PG: 28 MMHG
TR PEAK VELOCITY: 2.7 M/S
TRICUSPID VALVE PEAK REGURGITATION VELOCITY: 2.66 M/S
WBC # BLD AUTO: 14.93 THOUSAND/UL (ref 4.31–10.16)

## 2023-01-11 PROCEDURE — 30233N1 TRANSFUSION OF NONAUTOLOGOUS RED BLOOD CELLS INTO PERIPHERAL VEIN, PERCUTANEOUS APPROACH: ICD-10-PCS | Performed by: STUDENT IN AN ORGANIZED HEALTH CARE EDUCATION/TRAINING PROGRAM

## 2023-01-11 RX ADMIN — B-COMPLEX W/ C & FOLIC ACID TAB 1 TABLET: TAB at 08:50

## 2023-01-11 RX ADMIN — CEFEPIME HYDROCHLORIDE 1000 MG: 1 INJECTION, POWDER, FOR SOLUTION INTRAMUSCULAR; INTRAVENOUS at 12:34

## 2023-01-11 RX ADMIN — OXYBUTYNIN CHLORIDE 5 MG: 5 TABLET ORAL at 15:22

## 2023-01-11 RX ADMIN — INSULIN GLARGINE 15 UNITS: 100 INJECTION, SOLUTION SUBCUTANEOUS at 21:14

## 2023-01-11 RX ADMIN — OXYBUTYNIN CHLORIDE 5 MG: 5 TABLET ORAL at 08:50

## 2023-01-11 RX ADMIN — ACETAMINOPHEN 975 MG: 325 TABLET, FILM COATED ORAL at 21:14

## 2023-01-11 RX ADMIN — METOPROLOL TARTRATE 25 MG: 25 TABLET, FILM COATED ORAL at 21:16

## 2023-01-11 RX ADMIN — OXYCODONE HYDROCHLORIDE 10 MG: 10 TABLET ORAL at 07:52

## 2023-01-11 RX ADMIN — FUROSEMIDE 20 MG: 20 TABLET ORAL at 08:50

## 2023-01-11 RX ADMIN — OXYCODONE HYDROCHLORIDE 10 MG: 10 TABLET ORAL at 21:45

## 2023-01-11 RX ADMIN — OXYBUTYNIN CHLORIDE 5 MG: 5 TABLET ORAL at 21:14

## 2023-01-11 RX ADMIN — EZETIMIBE 10 MG: 10 TABLET ORAL at 08:50

## 2023-01-11 RX ADMIN — ACETAMINOPHEN 975 MG: 325 TABLET, FILM COATED ORAL at 07:52

## 2023-01-11 RX ADMIN — CYANOCOBALAMIN TAB 500 MCG 1000 MCG: 500 TAB at 08:50

## 2023-01-11 RX ADMIN — PANTOPRAZOLE SODIUM 20 MG: 20 TABLET, DELAYED RELEASE ORAL at 07:52

## 2023-01-11 RX ADMIN — HYDROMORPHONE HYDROCHLORIDE 0.2 MG: 0.2 INJECTION, SOLUTION INTRAMUSCULAR; INTRAVENOUS; SUBCUTANEOUS at 12:33

## 2023-01-11 RX ADMIN — ACETAMINOPHEN 975 MG: 325 TABLET, FILM COATED ORAL at 15:22

## 2023-01-11 RX ADMIN — CALCITRIOL 0.25 MCG: 0.25 CAPSULE, LIQUID FILLED ORAL at 08:50

## 2023-01-11 RX ADMIN — ARIPIPRAZOLE 2 MG: 2 TABLET ORAL at 08:50

## 2023-01-11 RX ADMIN — METOPROLOL TARTRATE 25 MG: 25 TABLET, FILM COATED ORAL at 08:50

## 2023-01-11 RX ADMIN — BIMATOPROST 1 DROP: 0.1 SOLUTION/ DROPS OPHTHALMIC at 21:14

## 2023-01-11 RX ADMIN — GABAPENTIN 300 MG: 300 CAPSULE ORAL at 21:14

## 2023-01-11 NOTE — ASSESSMENT & PLAN NOTE
· Patient was transferred from [address] [place] [address] due to fever and positive blood cultures  · Blood culture growing Enterobacter  · Port-A-Cath removed yesterday by interventional radiology, follow-up culture of tip  · Continue IV Cefepime 1g BID, renally dosed, follow ID recommendations-plan to transition to Levaquin p o  at time of discharge and complete for 7 days once port can be removed  · Repeat blood cultures no growth at 72 hours  · Follow-up urine culture growing E coli-ESBL  · ID consulted appreciate recommendations  · PT/OT recommending rehab

## 2023-01-11 NOTE — ASSESSMENT & PLAN NOTE
· History of hematuria due to bladder cancer  · Hg 7 1 prior to transfer   Received 1 unit PRBC  · Iron panel showing continued deficiency-unable to utilize IV iron given bacteremia  · Seen in consult by hematology oncology  · Per cardiology recommendations, given advanced CAD, will transfuse less than 8  · Plan was to transfuse 1 unit PRBCs yesterday but patient refused, hemoglobin improved to 8 2 this morning, will hold off on additional transfusions

## 2023-01-11 NOTE — PLAN OF CARE
Problem: Nutrition/Hydration-ADULT  Goal: Nutrient/Hydration intake appropriate for improving, restoring or maintaining nutritional needs  Description: Monitor and assess patient's nutrition/hydration status for malnutrition  Collaborate with interdisciplinary team and initiate plan and interventions as ordered  Monitor patient's weight and dietary intake as ordered or per policy  Utilize nutrition screening tool and intervene as necessary  Determine patient's food preferences and provide high-protein, high-caloric foods as appropriate       INTERVENTIONS:  - Monitor oral intake, urinary output, labs, and treatment plans  - Assess nutrition and hydration status and recommend course of action  - Evaluate amount of meals eaten  - Assist patient with eating if necessary   - Allow adequate time for meals  - Recommend/ encourage appropriate diets, oral nutritional supplements, and vitamin/mineral supplements  - Order, calculate, and assess calorie counts as needed  - Recommend, monitor, and adjust tube feedings and TPN/PPN based on assessed needs  - Assess need for intravenous fluids  - Provide specific nutrition/hydration education as appropriate  - Include patient/family/caregiver in decisions related to nutrition  Outcome: Progressing     Problem: Prexisting or High Potential for Compromised Skin Integrity  Goal: Skin integrity is maintained or improved  Description: INTERVENTIONS:  - Identify patients at risk for skin breakdown  - Assess and monitor skin integrity  - Assess and monitor nutrition and hydration status  - Monitor labs   - Assess for incontinence   - Turn and reposition patient  - Assist with mobility/ambulation  - Relieve pressure over bony prominences  - Avoid friction and shearing  - Provide appropriate hygiene as needed including keeping skin clean and dry  - Evaluate need for skin moisturizer/barrier cream  - Collaborate with interdisciplinary team   - Patient/family teaching  - Consider wound care consult   Outcome: Progressing     Problem: MOBILITY - ADULT  Goal: Maintain or return to baseline ADL function  Description: INTERVENTIONS:  -  Assess patient's ability to carry out ADLs; assess patient's baseline for ADL function and identify physical deficits which impact ability to perform ADLs (bathing, care of mouth/teeth, toileting, grooming, dressing, etc )  - Assess/evaluate cause of self-care deficits   - Assess range of motion  - Assess patient's mobility; develop plan if impaired  - Assess patient's need for assistive devices and provide as appropriate  - Encourage maximum independence but intervene and supervise when necessary  - Involve family in performance of ADLs  - Assess for home care needs following discharge   - Consider OT consult to assist with ADL evaluation and planning for discharge  - Provide patient education as appropriate  Outcome: Progressing  Goal: Maintains/Returns to pre admission functional level  Description: INTERVENTIONS:  - Perform BMAT or MOVE assessment daily    - Set and communicate daily mobility goal to care team and patient/family/caregiver  - Collaborate with rehabilitation services on mobility goals if consulted  - Perform Range of Motion 4 times a day  - Reposition patient every 2 hours    - Dangle patient 3 times a day  - Stand patient 3 times a day  - Ambulate patient 3 times a day  - Out of bed to chair 3 times a day   - Out of bed for meals 3 times a day  - Out of bed for toileting  - Record patient progress and toleration of activity level   Outcome: Progressing     Problem: Potential for Falls  Goal: Patient will remain free of falls  Description: INTERVENTIONS:  - Educate patient/family on patient safety including physical limitations  - Instruct patient to call for assistance with activity   - Consult OT/PT to assist with strengthening/mobility   - Keep Call bell within reach  - Keep bed low and locked with side rails adjusted as appropriate  - Keep care items and personal belongings within reach  - Initiate and maintain comfort rounds  - Make Fall Risk Sign visible to staff  - Offer Toileting every 2 Hours, in advance of need  - Initiate/Maintain bed alarm  - Obtain necessary fall risk management equipment: socks   - Apply yellow socks and bracelet for high fall risk patients  - Consider moving patient to room near nurses station  Outcome: Progressing     Problem: PAIN - ADULT  Goal: Verbalizes/displays adequate comfort level or baseline comfort level  Description: Interventions:  - Encourage patient to monitor pain and request assistance  - Assess pain using appropriate pain scale  - Administer analgesics based on type and severity of pain and evaluate response  - Implement non-pharmacological measures as appropriate and evaluate response  - Consider cultural and social influences on pain and pain management  - Notify physician/advanced practitioner if interventions unsuccessful or patient reports new pain  Outcome: Progressing     Problem: INFECTION - ADULT  Goal: Absence or prevention of progression during hospitalization  Description: INTERVENTIONS:  - Assess and monitor for signs and symptoms of infection  - Monitor lab/diagnostic results  - Monitor all insertion sites, i e  indwelling lines, tubes, and drains  - Monitor endotracheal if appropriate and nasal secretions for changes in amount and color  - [place] appropriate cooling/warming therapies per order  - Administer medications as ordered  - Instruct and encourage patient and family to use good hand hygiene technique  - Identify and instruct in appropriate isolation precautions for identified infection/condition  Outcome: Progressing  Goal: Absence of fever/infection during neutropenic period  Description: INTERVENTIONS:  - Monitor WBC    Outcome: Progressing     Problem: SAFETY ADULT  Goal: Maintain or return to baseline ADL function  Description: INTERVENTIONS:  -  Assess patient's ability to carry out ADLs; assess patient's baseline for ADL function and identify physical deficits which impact ability to perform ADLs (bathing, care of mouth/teeth, toileting, grooming, dressing, etc )  - Assess/evaluate cause of self-care deficits   - Assess range of motion  - Assess patient's mobility; develop plan if impaired  - Assess patient's need for assistive devices and provide as appropriate  - Encourage maximum independence but intervene and supervise when necessary  - Involve family in performance of ADLs  - Assess for home care needs following discharge   - Consider OT consult to assist with ADL evaluation and planning for discharge  - Provide patient education as appropriate  Outcome: Progressing  Goal: Maintains/Returns to pre admission functional level  Description: INTERVENTIONS:  - Perform BMAT or MOVE assessment daily    - Set and communicate daily mobility goal to care team and patient/family/caregiver  - Collaborate with rehabilitation services on mobility goals if consulted  - Perform Range of Motion 4 times a day  - Reposition patient every 2 hours    - Dangle patient 3 times a day  - Stand patient 3 times a day  - Ambulate patient 3 times a day  - Out of bed to chair 3 times a day   - Out of bed for meals 3 times a day  - Out of bed for toileting  - Record patient progress and toleration of activity level   Outcome: Progressing  Goal: Patient will remain free of falls  Description: INTERVENTIONS:  - Educate patient/family on patient safety including physical limitations  - Instruct patient to call for assistance with activity   - Consult OT/PT to assist with strengthening/mobility   - Keep Call bell within reach  - Keep bed low and locked with side rails adjusted as appropriate  - Keep care items and personal belongings within reach  - Initiate and maintain comfort rounds  - Make Fall Risk Sign visible to staff  - Offer Toileting every 2 Hours, in advance of need  - Initiate/Maintain bed alarm  - Obtain necessary fall risk management equipment: socks  - Apply yellow socks and bracelet for high fall risk patients  - Consider moving patient to room near nurses station  Outcome: Progressing     Problem: GENITOURINARY - ADULT  Goal: Maintains or returns to baseline urinary function  Description: INTERVENTIONS:  - Assess urinary function  - Encourage oral fluids to ensure adequate hydration if ordered  - Administer IV fluids as ordered to ensure adequate hydration  - Administer ordered medications as needed  - Offer frequent toileting  - Follow urinary retention protocol if ordered  Outcome: Progressing  Goal: Absence of urinary retention  Description: INTERVENTIONS:  - Assess patient’s ability to void and empty bladder  - Monitor I/O  - Bladder scan as needed  - Discuss with physician/AP medications to alleviate retention as needed  - Discuss catheterization for long term situations as appropriate  Outcome: Progressing  Goal: Urinary catheter remains patent  Description: INTERVENTIONS:  - Assess patency of urinary catheter  - If patient has a chronic wagner, consider changing catheter if non-functioning  - Follow guidelines for intermittent irrigation of non-functioning urinary catheter  Outcome: Progressing     Problem: HEMATOLOGIC - ADULT  Goal: Maintains hematologic stability  Description: INTERVENTIONS  - Assess for signs and symptoms of bleeding or hemorrhage  - Monitor labs  - Administer supportive blood products/factors as ordered and appropriate  Outcome: Progressing     Problem: MUSCULOSKELETAL - ADULT  Goal: Maintain or return mobility to safest level of function  Description: INTERVENTIONS:  - Assess patient's ability to carry out ADLs; assess patient's baseline for ADL function and identify physical deficits which impact ability to perform ADLs (bathing, care of mouth/teeth, toileting, grooming, dressing, etc )  - Assess/evaluate cause of self-care deficits   - Assess range of motion  - Assess patient's mobility  - Assess patient's need for assistive devices and provide as appropriate  - Encourage maximum independence but intervene and supervise when necessary  - Involve family in performance of ADLs  - Assess for home care needs following discharge   - Consider OT consult to assist with ADL evaluation and planning for discharge  - Provide patient education as appropriate  Outcome: Progressing  Goal: Maintain proper alignment of affected body part  Description: INTERVENTIONS:  - Support, maintain and protect limb and body alignment  - Provide patient/ family with appropriate education  Outcome: Progressing

## 2023-01-11 NOTE — PLAN OF CARE
Problem: OCCUPATIONAL THERAPY ADULT  Goal: Performs self-care activities at highest level of function for planned discharge setting  See evaluation for individualized goals  Description: Treatment Interventions: ADL retraining, Functional transfer training, UE strengthening/ROM, Endurance training, Patient/family training, Equipment evaluation/education, Compensatory technique education, Continued evaluation, Activityengagement          See flowsheet documentation for full assessment, interventions and recommendations  Note: Limitation: Decreased ADL status, Decreased UE strength, Decreased Safe judgement during ADL, Decreased endurance, Decreased self-care trans, Decreased high-level ADLs, Decreased fine motor control  Prognosis: Good  Assessment: Pt seen for 29 min tx session with focus on b/l UE ROM/strengthening, education, activity tolerance, and cognition  Pt declining OOB mobility 2* groin pain; agreeable for bedlevel activities  Pt able to tolerate b/l UE AA/AROM exercises; strength L>R; noted R wrist drop; splint applied after tx session  Therapist reviewed and encouraged b/l UE HEP and increased activity tolerance(i e increasing OOB activities)  Pt able to demonstrate good cognition(i e orientation, direction-following), but continues to demonstrate deficits--memory, problem-solving  Pt continues to demonstrate appropriateness for inpt rehab to improve his overall level of independence  Will continue       OT Discharge Recommendation: Post acute rehabilitation services

## 2023-01-11 NOTE — PROGRESS NOTES
[address] [place] [address]  Progress Note - Mayda Redmond 1945, 76 y o  male MRN: [id number]  Unit/Bed#: E5 -01 Encounter: [id number]  Primary Care Provider: Zaira Feliciano MD   Date and time admitted to hospital: 1/7/2023 10:17 PM    * Bacteremia due to Enterobacter species  Assessment & Plan  · Patient was transferred from [address] [place] [address] due to fever and positive blood cultures  · Blood culture growing Enterobacter  · Port-A-Cath removed yesterday by interventional radiology, follow-up culture of tip  · Continue IV Cefepime 1g BID, renally dosed, follow ID recommendations-plan to transition to Levaquin p o  at time of discharge and complete for 7 days once port can be removed  · Repeat blood cultures no growth at 72 hours  · Follow-up urine culture growing E coli-ESBL  · ID consulted appreciate recommendations  · PT/OT recommending rehab    History of pleural effusion  Assessment & Plan  · History of pleural effusion with pleural cath  · Reports he feels as though he has fluid in his lung again and asking for this to be drained   · Will check chest XR and drain if effusion present   · Thoracic surgery follow up     Acute on chronic blood loss anemia  Assessment & Plan  · History of hematuria due to bladder cancer  · Hg 7 1 prior to transfer  Received 1 unit PRBC  · Iron panel showing continued deficiency-unable to utilize IV iron given bacteremia  · Seen in consult by hematology oncology  · Per cardiology recommendations, given advanced CAD, will transfuse less than 8  · Plan was to transfuse 1 unit PRBCs yesterday but patient refused, hemoglobin improved to 8 2 this morning, will hold off on additional transfusions    Nephrostomy status ([facility] [address] )  Assessment & Plan  · Chronic bilateral nephrostomy tubes  · Prior to transfer patient reported abdominal pain, CT was done and shows: Stable bilateral percutaneous nephrostomy tubes with interval placement of a right double-J nephroureteral stent    Stable mild right upper pole caliectasis, with resolution of previously seen lower pole caliectasis and hydroureter  Persistent blood   · Appreciate urology recommendations  · Monitor I&O    Elevated brain natriuretic peptide (BNP) level  Assessment & Plan  · BNP >2000  EF 60-65%, normal wall motion  · CT small stable left pleural effusion with drainage catheter in place  · Continue lasix 20 mg daily with hold parameters  · Monitor daily weight  · Repeat echocardiogram    Influenza A  Assessment & Plan  · Patient tested + for influenza A prior to discharge to [facility] on [address]  · Asymptomatic therefore not treated   · Negative flu on Jan 7    Chronic pleural effusion  Assessment & Plan  · Chronic pleural effusion  CT showing Stable small left pleural effusion with drainage catheter in place  Stable epicardial metastasis    Stage 4 chronic kidney disease [facility])  Assessment & Plan  Lab Results   Component Value Date    EGFR 19 01/10/2023    EGFR 18 01/09/2023    EGFR 17 01/08/2023    CREATININE 2 95 (H) 01/10/2023    CREATININE 3 11 (H) 01/09/2023    CREATININE 3 18 (H) 01/08/2023     · Chronic CKD 4 at baseline  Monitor BMP    Continuous opioid dependence (HCC)  Assessment & Plan  · Maintained on oxycodone 10mg TID prn, verified on PDMP    Gross hematuria  Assessment & Plan  · Patient has persistent hematuria due to bladder cancer  History of recurrent UTI on suppressive Bactrim  · CT A/P: Stable bladder wall thickening, compatible with known malignancy  Increased hyperdensity within the bladder lumen, compatible with new blood clot  · Recent admission at [address] [place] [address] 12/12-12/22 for hematuria and obstructive uropathy in the setting of known bladder cancer s/p BCG and subsequent chemotherapy/radiation s/p bilateral chronic PCN tubes   Noted to have new right-sided hydronephrosis on CT s/p cystoscopy, clot evacuation w/ right ureteral stenting on 12/14 by Dr Andria Rivers  · UA bloody, innumerable RBC/WBC, +leuks, no nitrites  · Continue hand irrigation as needed  · CBI clamped yesterday, now with recurrent hematuria  · Monitor I&O  · Continue monitoring hemoglobin and transfuse less than 8  · Urine culture growing ESBL E  coli  · Urology following    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)  Assessment & Plan  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, recurrence with CIS in 2016 BCG again  BCG refractory disease with pelvic metastases now s/p chemoradiation 2021, on current immunotherapy  · Chronic B/L PCN tubes  · CT A/P Jan 7: Stable bladder wall thickening, compatible with known malignancy  Stable metastatic retroperitoneal lymphadenopathy  Stable epicardial metastasis  · Per oncology note on Jan 6: Chemotherapy treatment should remain on hold for now untill he is clinically stable/discharged; particularly since he is positive flu  To follow-up with primary oncologist after discharge for reevaluation prior to resume treatment  · Required removal of Port-A-Cath given bacteremia without known source-oncology aware  · IR consulted yesterday for PCN tube exchange    Type 2 diabetes mellitus, with long-term current use of insulin ([address] )  Assessment & Plan  Lab Results   Component Value Date    HGBA1C 7 2 (H) 10/20/2022     · Currently receiving Lantus 15 units with sliding scale TID  · ADA diet  · Family reported allergy to Humalog, discontinued Humalog and utilize Lantus only    Recent Labs     01/10/23  1103 01/10/23  1606 01/10/23  2136 01/11/23  0702   POCGLU 111 166* 198* 81       Blood Sugar Average: Last 72 hrs:  (P) 144 [id number]    Coronary artery disease of native artery of native heart with stable angina pectoris (HCC)  Assessment & Plan  · Outpatient regimen ASA, Imdur, metoprolol, zetia  · Repeat echocardiogram pending        VTE Pharmacologic Prophylaxis: VTE Score: 6 High Risk (Score >/= 5) - Pharmacological DVT Prophylaxis Contraindicated   Sequential Compression Devices Ordered  Patient Centered Rounds: I performed bedside rounds with nursing staff today  Discussions with Specialists or Other Care Team Provider: none    Education and Discussions with Family / Patient: update wife at bedside  updated granddaughter Sam Eden over the phone  Time Spent for Care: 30 minutes  More than 50% of total time spent on counseling and coordination of care as described above  Current Length of Stay: 4 day(s)  Current Patient Status: Inpatient   Certification Statement: The patient will continue to require additional inpatient hospital stay due to Bacteremia requiring IV antibiotics, hematuria requiring urologic consultation  Discharge Plan: Anticipate discharge in >72 hrs to rehab facility  Code Status: Level 1 - Full Code    Subjective:   Patient reports he is not doing well today  Complains of significant groin pain  Unable to get up with therapy this morning due to his pain  Denies any pain at the area of his PCN tube exchange yesterday  Objective:     Vitals:   Temp (24hrs), Av 9 °F (37 2 °C), Min:98 5 °F (36 9 °C), Max:99 4 °F (37 4 °C)    Temp:  [98 5 °F (36 9 °C)-99 4 °F (37 4 °C)] 98 7 °F (37 1 °C)  HR:  [] 79  Resp:  [14-17] 16  BP: (120-138)/(65-76) 133/75  SpO2:  [78 %-100 %] 99 %  Body mass index is 23 94 kg/m²  Input and Output Summary (last 24 hours): Intake/Output Summary (Last 24 hours) at 2023 1320  Last data filed at 2023 0801  Gross per 24 hour   Intake 20 ml   Output 1720 ml   Net -1700 ml       Physical Exam:   Physical Exam  Vitals reviewed  Constitutional:       General: He is not in acute distress  HENT:      Head: Normocephalic and atraumatic  Eyes:      General: No scleral icterus  Conjunctiva/sclera: Conjunctivae normal    Cardiovascular:      Rate and Rhythm: Normal rate and regular rhythm  Heart sounds: No murmur heard  Pulmonary:      Effort: Pulmonary effort is normal  No respiratory distress  Breath sounds: Normal breath sounds  Abdominal:      General: Bowel sounds are normal  There is no distension  Palpations: Abdomen is soft  Tenderness: There is no abdominal tenderness  Genitourinary:     Comments: Shirley draining red urine  Musculoskeletal:      Cervical back: Neck supple  Right lower leg: No edema  Left lower leg: No edema  Skin:     General: Skin is warm and dry  Neurological:      Mental Status: He is alert and oriented to person, place, and time  Psychiatric:         Mood and Affect: Mood normal          Behavior: Behavior normal           Additional Data:     Labs:  Results from last 7 days   Lab Units 01/11/23  0848   WBC Thousand/uL 14 93*   HEMOGLOBIN g/dL 8 2*   HEMATOCRIT % 25 3*   PLATELETS Thousands/uL 228   NEUTROS PCT % 70   LYMPHS PCT % 17   MONOS PCT % 6   EOS PCT % 6     Results from last 7 days   Lab Units 01/11/23  0848   SODIUM mmol/L 138   POTASSIUM mmol/L 5 7*   CHLORIDE mmol/L 103   CO2 mmol/L 26   BUN mg/dL 49*   CREATININE mg/dL 3 02*   ANION GAP mmol/L 9   CALCIUM mg/dL 9 0   ALBUMIN g/dL 2 1*   TOTAL BILIRUBIN mg/dL 0 35   ALK PHOS U/L 126*   ALT U/L 21   AST U/L 31   GLUCOSE RANDOM mg/dL 93     Results from last 7 days   Lab Units 01/07/23  1613   INR  0 90     Results from last 7 days   Lab Units 01/11/23  0702 01/10/23  2136 01/10/23  1606 01/10/23  1103 01/10/23  0927 01/09/23  2120 01/09/23  1556 01/09/23  1124 01/09/23  0721 01/08/23  2037 01/08/23  1631 01/08/23  1132   POC GLUCOSE mg/dl 81 198* 166* 111 161* 126 166* 207* 78 238* 124 128         Results from last 7 days   Lab Units 01/07/23  1613 01/07/23  1114 01/06/23  0704   LACTIC ACID mmol/L 1 4  --   --    PROCALCITONIN ng/ml  --  0 65* 0 47*       Lines/Drains:  Invasive Devices     Peripheral Intravenous Line  Duration           Peripheral IV 01/11/23 Dorsal (posterior); Left Wrist <1 day          Drain  Duration           Urethral Catheter Three way 20 Fr  3 days    Nephrostomy Left 10 2 Fr  <1 day    Nephrostomy Right 10 2 Fr  <1 day              Urinary Catheter:  Goal for removal: urologic requirement                Imaging: No pertinent imaging reviewed  Recent Cultures (last 7 days):   Results from last 7 days   Lab Units 01/07/23  1653 01/07/23  1613 01/07/23  1611 01/06/23  2030 01/06/23  1530   BLOOD CULTURE   --  No Growth at 72 hrs  No Growth at 72 hrs   Enterobacter cloacae*  --    GRAM STAIN RESULT   --   --   --  Gram negative rods*  --    URINE CULTURE  >100,000 cfu/ml Escherichia coli ESBL*  80,000-89,000 cfu/ml Enterococcus faecalis*  --   --   --  >100,000 cfu/ml - Presumptive Stenotrophomonas maltophilia*  50,000-59,000 cfu/ml Candida albicans*  >100,000 cfu/ml Pseudomonas putida group*  >100,000 cfu/ml Enterococcus faecalis*       Last 24 Hours Medication List:   Current Facility-Administered Medications   Medication Dose Route Frequency Provider Last Rate   • acetaminophen  975 mg Oral [facility] Gilma Black PA-C     • ALPRAZolam  0 25 mg Oral BID PRN EVANGELINA Douglas     • aluminum-magnesium hydroxide-simethicone  30 mL Oral Q6H PRN EVANGELINA Douglas     • ARIPiprazole  2 mg Oral Daily EVANGELINA Douglas     • azelastine  1 spray Each Nare BID PRN EVANGELINA Douglas     • bimatoprost  1 drop Both Eyes HS EVANGELINA Douglas     • calcitriol  0 25 mcg Oral Daily EVANGELINA Douglas     • cefepime  1,000 mg Intravenous Q12H EVANGELINA Douglas 1,000 mg (01/11/23 1234)   • cyanocobalamin  1,000 mcg Oral Daily EVANGELINA Douglas     • ezetimibe  10 mg Oral Daily EVANGELINA Douglas     • furosemide  20 mg Oral Daily EVANGELINA Douglas     • gabapentin  300 mg Oral HS EVANGELINA Douglas     • HYDROmorphone  0 2 mg Intravenous 4x Daily PRN EVANGELINA Douglas     • insulin glargine  15 Units Subcutaneous HS Lorna Fletcher PA-C     • metoprolol tartrate  25 mg Oral Q12H [facility] CRNP     • multivitamin stress formula  1 tablet Oral Daily Domínguez Pleasant, CRNP     • ondansetron  4 mg Intravenous Q6H PRN Domínguez Pleasant, CRNP     • oxybutynin  5 mg Oral TID EVANGELINA Gan     • oxyCODONE  10 mg Oral TID PRN Domínguez Pleasant, CRNP     • pantoprazole  20 mg Oral Early Morning Domínguez Pleasant, CRNP     • polyethylene glycol  17 g Oral Daily PRN Domínguez Pleasant, CRNP     • senna  2 tablet Oral BID PRN Domínguez Pleasant, CRNP     • simethicone  80 mg Oral 4x Daily PRN Domínguez Pleasant, CRNP          Today, Patient Was Seen By: Sylvia Ivy PA-C    **Please Note: This note may have been constructed using a voice recognition system  **

## 2023-01-11 NOTE — ASSESSMENT & PLAN NOTE
· History of pleural effusion with pleural cath  · Reports he feels as though he has fluid in his lung again and asking for this to be drained   · Will check chest XR and drain if effusion present   · Thoracic surgery follow up

## 2023-01-11 NOTE — PROGRESS NOTES
Progress Note - Urology  Silver Keane 1945, 76 y o  male MRN: [id number]    Unit/Bed#: E5 -01 Encounter: [id number]    Assessment/Plan:  1  High grade treatment refractory bladder cancer from 1995 to current, several intravesical therapies in those decades, now s/p chemoradiation 2021; malignant bilateral ureteral obstruction managed with bilateral nephrostomy tubes in addition to right ureteral stent for upper tract bleeding tamponade attempt new stent from 12/14/22- plan was for ureteral stent exchange in 3 months with possible TURBT at that time  On immunotherapy managed by medical oncology  IR exchanged his nephrostomy tubes yesterday without incident  Draining light pink urine today  2  Symptomatic anemia with near syncope- resolved s/p blood transfusion 1/2/23; BP remains stable in the 120s/70s  Denies dizziness  Hgb 8 2 today without intervention      3  Enterobacter cloacae bacteremia- presumed urinary source though his usual urinary organism is e coli  His port-a-cath was removed and cultured yesterday also as possible source but had no issues with it  Tolerating antibiotics well      4  Gross hematuria- CBI clamped yesterday  Urine red in bag, but clear peach/orange in tubing  Manually irrigates well  Some debris and clot material expressed  Drains well  Plan continue q4h hand irrigation or sooner if needed for tube patency  Continue home rx ditropan 5mg TID for bladder spasm  I do not think cbi is needed today  Unfortunately he will probably continue to have some degree of hematuria as a result of his indwelling ureteral stent (for hydroureteronephrosis despite pcn) in combination with radiation cystitis (bladder radiation 2021)  Hopeful this will improve if removal of the ureteral stent at the next planned interval as long as his kidney function does okay without the stent      5   Influenza A positive- per primary medical team, fatigue no cough today but feels lungs are tight especially left side where the pleurX is  No fevers in several days  Subjective: complains of general pain  Mostly left pleural catheter bothering him today in the upper back  PCNs changed yesterday without issues  Draining well  No issues of wagner catheter occlusion overnight off cbi  Manual irrigations with some debris/clot per nursing staff  Currently red in bag, peach in tubing  Review of Systems   Constitutional: Positive for fatigue  Respiratory: Positive for cough  Negative for shortness of breath and wheezing  Cardiovascular: Negative  Negative for chest pain  Genitourinary: Positive for difficulty urinating and hematuria  Negative for decreased urine volume, dysuria, flank pain, frequency, penile pain, penile swelling and urgency  Musculoskeletal: Positive for back pain  Objective:  Vitals: Blood pressure 133/75, pulse 79, temperature 98 7 °F (37 1 °C), temperature source Oral, resp  rate 16, weight 89 2 kg (196 lb 10 4 oz), SpO2 99 %  ,Body mass index is 23 94 kg/m²  Intake/Output Summary (Last 24 hours) at 1/11/2023 1506  Last data filed at 1/11/2023 0801  Gross per 24 hour   Intake 20 ml   Output 1720 ml   Net -1700 ml     Invasive Devices     Peripheral Intravenous Line  Duration           Peripheral IV 01/11/23 Dorsal (posterior); Left Wrist <1 day          Drain  Duration           Urethral Catheter Three way 20 Fr  3 days    Nephrostomy Left 10 2 Fr  1 day    Nephrostomy Right 10 2 Fr  1 day                Physical Exam  Vitals and nursing note reviewed  Constitutional:       General: He is not in acute distress  Appearance: Normal appearance  He is well-developed  He is not diaphoretic  HENT:      Head: Normocephalic and atraumatic  Pulmonary:      Effort: Pulmonary effort is normal       Comments: No cough or audible wheeze  Abdominal:      General: There is no distension  Tenderness: There is no abdominal tenderness   There is no right CVA tenderness or left CVA tenderness  Genitourinary:     Comments: uncircumcised penis, normal phallus, orthotopic patent meatus  Shirley per urethra draining clear peach orange urine in tubing, clear red in collection bag  Manually irrigates nicely few debris/clot over past few days but drains freely  Musculoskeletal:      Right lower leg: No edema  Left lower leg: No edema  Skin:     General: Skin is warm and dry  Neurological:      Mental Status: He is alert and oriented to person, place, and time        Gait: Gait normal    Psychiatric:         Speech: Speech normal          Behavior: Behavior normal          Labs:  Recent Labs     01/09/23  0514 01/10/23  0503 01/11/23  0848   WBC 9 47 11 60* 14 93*     Recent Labs     01/09/23  0514 01/10/23  0503 01/11/23  0848   HGB 7 3* 7 4* 8 2*       Recent Labs     01/09/23  0514 01/10/23  0503 01/11/23  0848   CREATININE 3 11* 2 95* 3 02*       History:    Past Medical History:   Diagnosis Date   • Anemia     Last assessed: 9/28/17   • Anxiety    • Arteriosclerotic cardiovascular disease     Last assessed: 9/28/17   • Arthritis    • Bladder cancer ([facility] )     bladder- had BCG treatments   • Chronic kidney disease     Stage IV   • CKD (chronic kidney disease) stage 4, GFR 15-29 ml/min ([facility])    • Colon polyp    • Coronary artery disease     7 stents   • Depression    • Diabetes mellitus ([facility])     IDDM   • GERD (gastroesophageal reflux disease)    • Glaucoma    • Hematuria    • History of fusion of cervical spine    • Hyperlipidemia    • Hypertension    • Insomnia     Last assessed: 11/14/12   • Loss of hearing     has hearing aids but usually does not wear them   • Lung cancer ([facility] )    • Metastatic cancer ([facility] )    • Other seasonal allergic rhinitis     Last assessed: 2/10/16   • PAD (peripheral artery disease) ([facility])    • Shortness of breath     on exertion   • Spinal stenosis of lumbar region    • Transient cerebral ischemia     No Residual   • Uses walker     w/c for longer distances Past Surgical History:   Procedure Laterality Date   • CARDIAC SURGERY      Cath stent placement  Last assessed: 3/9/17  Interventional Catheterization   • CHOLECYSTECTOMY     • COLONOSCOPY     • CYSTOSCOPY      Diagnostic w/biopsy  Bao Jessi  Last assessed: 12/1/14   • CYSTOSCOPY N/A 04/12/2022    Procedure: CYSTOSCOPY  Bladder biopsies  ;  Surgeon: Alba Garzon MD;  [facility];  Service: Urology   • CYSTOSCOPY W/ RETROGRADES Right 03/01/2022    Procedure: CYSTO; stent removal retrograde;  Surgeon: Alba Garzon MD;  [facility];  Service: Urology   • CYSTOSCOPY W/ URETERAL STENT PLACEMENT Bilateral 10/18/2021    Procedure: bilateral retrogrades, cytology collection;  Surgeon: Alba Garzon MD;  [facility];  Service: Urology   • CYSTOURETHROSCOPY      w/cautery  Bao Jessi   • FL RETROGRADE PYELOGRAM  10/18/2021   • FL RETROGRADE PYELOGRAM  10/24/2021   • FL RETROGRADE PYELOGRAM  12/14/2022   • GASTRIC BYPASS      For morbid obesity w/Shaji-en-Y   Resolved: 11/17/09   • INCISION AND DRAINAGE OF WOUND Right 02/26/2017    Procedure: INCISION AND DRAINAGE (I&D) EXTREMITY WITH APPLICATION OF GRAFT JACKET;  Surgeon: Huyen Guerrero DPM;  [facility];  Service:    • INCISION AND DRAINAGE OF WOUND Right 04/25/2017    Procedure: INCISION AND DRAINAGE (I&D) EXTREMITY, APPLICATION OF GRAFT;  Surgeon: Huyen Guerrero DPM;  [facility];  Service:    • IR BIOPSY OTHER  07/02/2020   • IR LOWER EXTREMITY ANGIOGRAM  02/08/2021   • IR LOWER EXTREMITY ANGIOGRAM  02/11/2021   • IR NEPHROSTOMY TUBE CHECK/CHANGE/REPOSITION/REINSERTION/UPSIZE  04/28/2022   • IR NEPHROSTOMY TUBE CHECK/CHANGE/REPOSITION/REINSERTION/UPSIZE  05/24/2022   • IR NEPHROSTOMY TUBE CHECK/CHANGE/REPOSITION/REINSERTION/UPSIZE  06/07/2022   • IR NEPHROSTOMY TUBE CHECK/CHANGE/REPOSITION/REINSERTION/UPSIZE  07/28/2022   • IR NEPHROSTOMY TUBE CHECK/CHANGE/REPOSITION/REINSERTION/UPSIZE  11/15/2022 • IR NEPHROSTOMY TUBE CHECK/CHANGE/REPOSITION/REINSERTION/UPSIZE  1/10/2023   • IR NEPHROSTOMY TUBE PLACEMENT  02/25/2022   • IR PORT PLACEMENT  01/17/2022   • IR PORT REMOVAL  1/10/2023   • IR THORACENTESIS  09/02/2022   • IR THORACENTESIS  09/14/2022   • IR THORACENTESIS WITH TUBE PLACEMENT Left     october   • IR TUNNELED CENTRAL LINE PLACEMENT  12/24/2020   • JOINT REPLACEMENT      christofer knees replaced   • IN AMPUTATION METATARSAL W/TOE SINGLE Left 12/21/2020    Procedure: RAY RESECTION FOOT;  Surgeon: Nancy Dawn DPM;  [facility];  Service: Podiatry   • IN AMPUTATION METATARSAL W/TOE SINGLE Left 12/31/2020    Procedure: 5TH MET RESECTION;  Surgeon: Nancy Dawn DPM;  [facility];  Service: Podiatry   • IN CYSTO BLADDER W/URETERAL CATHETERIZATION Right 12/14/2022    Procedure: CYSTOSCOPY WITH RETROGRADE PYELOGRAM and CLOT EVACUATION, RIGHT STENT INSERTION, FULGRATION OF BLEEDING POINTS;  Surgeon: Alissa Martin MD;  [facility];  Service: Urology   • IN CYSTO W/IRRIG & EVAC MULTPLE OBSTRUCTING CLOTS N/A 02/10/2021    Procedure: CYSTOSCOPY EVACUATION OF CLOTS, fulguration;  Surgeon: Jane Parker MD;  [facility];  Service: Urology   • IN CYSTO W/IRRIG & EVAC MULTPLE OBSTRUCTING CLOTS N/A 10/24/2021    Procedure: CYSTOSCOPY EVACUATION OF CLOT, fulguration of bleeding vessels, right ureter stent placement, retrograde pyelogram;  Surgeon: Carie Bhat MD;  [facility];  Service: Urology   • IN CYSTO W/REMOVAL OF LESIONS SMALL N/A 11/19/2020    Procedure: CYSTO W/BIOPSIES, transurethral prostate bx;  Surgeon: Charlotte Castro MD;  [facility];  Service: Urology   • IN CYSTOURETHROSCOPY W/DEST &/RMVL TUMOR LARGE Bilateral 10/18/2021    Procedure: TRANSURETHRAL RESECTION OF BLADDER TUMOR (TURBT);   Surgeon: Esthela Long MD;  [facility];  Service: Urology   • IN CYSTOURETHROSCOPY WITH BIOPSY N/A 08/16/2016    Procedure: CYSTOSCOPY WITH BIOPSIES;  Surgeon: Alvin Vann MD;  [facility];  Service: Urology   • VT Margaretmary Button 3RD+ ORD SLCTV ABDL PEL/LXTR [facility] Left 2021    Procedure: LEG angiogram, CO2 w/limited contrast with balloon angioplasty postertior tibial artery;  Surgeon: Clint Evans MD;  [facility];  Service: Vascular   • ROTATOR CUFF REPAIR     • SMALL INTESTINE SURGERY      Surgery Shaji-en-Y   • SPINAL FUSION      lumbar and cervical fusions   • VAC DRESSING APPLICATION Right     Procedure: APPLICATION VAC DRESSING;  Surgeon: Ken Gillis DPM;  [facility];  Service:    • WOUND DEBRIDEMENT Left 2021    Procedure: FOOT DEBRIDE, [address] OUT w/graft application;  Surgeon: Ken Gillis DPM;  [facility];  Service: Podiatry     Family History   Problem Relation Age of Onset   • Diabetes Mother    • Heart disease Mother    • Other Mother         High blood pressure   • Heart disease Father    • Diabetes Sister    • Other Sister         High blood pressure   • Kidney disease Sister    • Heart disease Brother    • Other Brother         High blood pressure     Social History     Socioeconomic History   • Marital status: /Civil Union     Spouse name: Not on file   • Number of children: Not on file   • Years of education: Not on file   • Highest education level: Not on file   Occupational History   • Not on file   Tobacco Use   • Smoking status: Former     Packs/day: 3 00     Years: 27 00     Pack years: 81 00     Types: Cigarettes     Quit date: 1970     Years since quittin 0   • Smokeless tobacco: Never   Vaping Use   • Vaping Use: Never used   Substance and Sexual Activity   • Alcohol use: Never     Comment: beer / liquor   • Drug use: Not Currently     Types: Marijuana     Comment: quit 2019 had medical marijuana   • Sexual activity: Not Currently   Other Topics Concern   • Not on file   Social History Narrative    Consumes 1 cup of coffee and 1 soda per day Social Determinants of Health     Financial Resource Strain: Not on file   Food Insecurity: No Food Insecurity   • Worried About [address] in the Last Year: Never true   • Ran Out of Food in the Last Year: Never true   Transportation Needs: No Transportation Needs   • Lack of Transportation (Medical): No   • Lack of Transportation (Non-Medical):  No   Physical Activity: Not on file   Stress: Not on file   Social Connections: Not on file   Intimate Partner Violence: Not on file   Housing Stability: Low Risk    • Unable to Pay for Housing in the Last Year: No   • Number of Places Lived in the Last Year: 1   • Unstable Housing in the Last Year: No         Alvarez [place]  Date: 1/11/2023 Time: 3:06 PM

## 2023-01-11 NOTE — MALNUTRITION/BMI
This medical record reflects one or more clinical indicators suggestive of malnutrition  Malnutrition Findings:   Adult Malnutrition type: Chronic illness  Adult Degree of Malnutrition: Other severe protein calorie malnutrition  Malnutrition Characteristics: Weight loss, Muscle loss, Fat loss                  360 Statement: related to inadequate energy intake as evidenced by 6% weight loss 12/20/22-1/11/23, hollowing of supraclavicular space, wasted interosseous, hollowing orbital  Intervention CCD diet, medical food supplements with meals  BMI Findings: Body mass index is 23 94 kg/m²  See Nutrition note dated 1/11/2023 for additional details  Completed nutrition assessment is viewable in the nutrition documentation

## 2023-01-11 NOTE — ASSESSMENT & PLAN NOTE
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, recurrence with CIS in 2016 BCG again  BCG refractory disease with pelvic metastases now s/p chemoradiation 2021, on current immunotherapy  · Chronic B/L PCN tubes  · CT A/P Jan 7: Stable bladder wall thickening, compatible with known malignancy  Stable metastatic retroperitoneal lymphadenopathy  Stable epicardial metastasis  · Per oncology note on Jan 6: Chemotherapy treatment should remain on hold for now untill he is clinically stable/discharged; particularly since he is positive flu  To follow-up with primary oncologist after discharge for reevaluation prior to resume treatment    · Required removal of Port-A-Cath given bacteremia without known source-oncology aware  · IR consulted yesterday for PCN tube exchange

## 2023-01-11 NOTE — OCCUPATIONAL THERAPY NOTE
Occupational Therapy Progress Note(lkmg=[id number])     Patient Name: Meghan Herrera  Today's Date: 1/11/2023  Problem List  Principal Problem:    Bacteremia due to Enterobacter species  Active Problems:    Coronary artery disease of native artery of native heart with stable angina pectoris ([address] )    Type 2 diabetes mellitus, with long-term current use of insulin (HCC)    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)    Gross hematuria    Continuous opioid dependence (HCC)    Stage 4 chronic kidney disease (HCC)    Chronic pleural effusion    Symptomatic hypotension    Influenza A    Elevated brain natriuretic peptide (BNP) level    Nephrostomy status (HCC)    Acute on chronic blood loss anemia            01/11/23 0840   Note Type   Note Type Treatment   Pain Assessment   Pain Assessment Tool 0-10   Pain Score 10 - Worst Possible Pain   Pain Location/Orientation Location: Groin   Restrictions/Precautions   Weight Bearing Precautions Per Order No   Braces or Orthoses Splint  (soft resting hand splint, R UE)   Other Precautions Contact/isolation; Chair Alarm; Bed Alarm; Fall Risk;Pain;Multiple lines   Therapeutic Exercise - ROM   UE-ROM Yes  (b/l UE AA/AROM(i e shr flex/ext/abd/add, elbow flex/ext, R wrist/finger flex/ext)1set, 10reps)   Subjective   Subjective "I have too much pain to get out of bed "   Cognition   Overall Cognitive Status Impaired   Arousal/Participation Arousable   Attention Attends with cues to redirect   Orientation Level Oriented X4   Memory Decreased short term memory;Decreased recall of recent events;Decreased recall of precautions  (2/3 recall after 5min)   Following Commands Follows one step commands without difficulty   Activity Tolerance   Activity Tolerance Patient limited by fatigue;Patient limited by pain   Medical Staff Made Aware nsg   Assessment   Assessment Pt seen for 29 min tx session with focus on b/l UE ROM/strengthening, education, activity tolerance, and cognition  Pt declining OOB mobility 2* groin pain; agreeable for bedlevel activities  Pt able to tolerate b/l UE AA/AROM exercises; strength L>R; noted R wrist drop; splint applied after tx session  Therapist reviewed and encouraged b/l UE HEP and increased activity tolerance(i e increasing OOB activities)  Pt able to demonstrate good cognition(i e orientation, direction-following), but continues to demonstrate deficits--memory, problem-solving  Pt continues to demonstrate appropriateness for inpt rehab to improve his overall level of independence  Will continue  Plan   Treatment Interventions Endurance training;Cognitive reorientation;Patient/family training;Equipment evaluation/education; Compensatory technique education; ADL retraining;Functional transfer training; Activityengagement   Goal Expiration Date 01/23/23   OT Treatment Day 1   OT Frequency 3-5x/wk   Recommendation   OT Discharge Recommendation Post acute rehabilitation services   AM-PAC Daily Activity Inpatient   Lower Body Dressing 2   Bathing 2   Toileting 2   Upper Body Dressing 2   Grooming 3   Eating 3   Daily Activity Raw Score 14   Daily Activity Standardized Score (Calc for Raw Score >=11) 33 39   AM-PAC Applied Cognition Inpatient   Following a Speech/Presentation 3   Understanding Ordinary Conversation 3   Taking Medications 3   Remembering Where Things Are Placed or Put Away 3   Remembering List of 4-5 Errands 3   Taking Care of Complicated Tasks 2   Applied Cognition Raw Score 17   Applied Cognition Standardized Score 36 52     Brenda Martinez

## 2023-01-11 NOTE — PROGRESS NOTES
Progress Note - Infectious Disease   Josee Irizarry 76 y o  male MRN: [id number]  Unit/Bed#: E5 -01 Encounter: [id number]      Impression/Plan:  1   Enterobacter cloacae bacteremia   1 of 2 blood culture sets collected 1/6/2023 showed growth of Enterobacter cloacae  Urine cultures this time ultimately did not isolate Enterobacter but have isolated in the past   Suspicion remains for possible transient urinary source for his bacteremia but ultimately could not rule out impact or role of the patient's port  CT imaging though without contrast was largely unremarkable otherwise  Nephrostomies tubes exchanged and port removed on 1/10  Mild elevation in white count likely from procedure  Repeat cultures remain without growth  Recent EKG reviewed and unremarkable  Patient confirmed tolerance of Levaquin in the past   -continue IV cefepime renally dose adjusted while admitted  -Transition to Levaquin 750 mg every 48 hours at discharge, if needed  -Plan for total of 7 days of therapy through 1/17  -Drug administration reviewed with the patient  -Repeat CBC and chemistry tomorrow   -continue follow up with urology  -Follow-up or cultures while admitted   -Additional support care/discharge as per primary     2   Metastatic high-grade treatment refractory bladder cancer s/p chemoradiation 2021  Patient is currently on immunotherapy with Enfortumab  Complicated by malignant bilateral ureteral obstruction s/p bilateral percutaneous nephrostomies and right ureteral stent placement  Patient has left pleural catheter in place  Patient has been seen inpatient by urology and oncology   -continue follow up with urology  -continue follow up with oncology      3   CKD stage IV   Creatinine currently at baseline    Creatinine clearance calculated today   -monitor creatinine  -Cefepime dose adjusted  -Levaquin dosing as above  -Further dose adjust if needed     4   Acute on chronic blood loss anemia   Patient has history of recurrent hematuria due to bladder cancer   Requires intermittent blood transfusions  -monitor CBCD  -transfusion support per primary/ongolocy     5   Influenza A   Positive on 1/3/2023, asymptomatic   Repeat testing here was negative  No further isolation      6   Type 2 diabetes mellitus with long-term use of insulin  Patient's last HbA1c was 7 2% on 10/20/2022    -blood glucose management per primary service     Above plan discussed in detail with the patient at bedside    ID consult service will continue to follow  Antibiotics:  Cefepime 5  abx 5    24 Hour events:  Yesterday and overnight notes reviewed and no acute events noted    Subjective:  Patient seen at bedside and he denied having any nausea, vomiting, chest pain or shortness of breath  Tolerating current antibiotic without issue  Patient is reporting bladder spasms again and noted to have some blood in his Shirley catheter  Objective:  Vitals:  Temp:  [98 5 °F (36 9 °C)-99 4 °F (37 4 °C)] 98 7 °F (37 1 °C)  HR:  [] 79  Resp:  [14-17] 16  BP: (120-138)/(65-76) 133/75  SpO2:  [78 %-100 %] 99 %  Temp (24hrs), Av 9 °F (37 2 °C), Min:98 5 °F (36 9 °C), Max:99 4 °F (37 4 °C)  Current: Temperature: 98 7 °F (37 1 °C)    Physical Exam:   General Appearance:  Alert, interactive, nontoxic, no acute distress  Throat: Oropharynx moist without lesions  Lungs:   Clear to auscultation bilaterally; no wheezes, rhonchi or rales; respirations unlabored on room air   Heart:  RRR; no murmur, rub or gallop appreciated   Abdomen:   Soft, non-tender, non-distended, positive bowel sounds  Shirley catheter with bloody urine  Extremities: No clubbing, cyanosis or edema   Skin: No new rashes or lesions  No new draining wounds noted  Labs, Imaging, & Other studies:   All pertinent labs and imaging studies were personally reviewed as below    Results from last 7 days   Lab Units 23  0848 01/10/23  0503 23  0514   WBC Thousand/uL 14 93* 11 60* 9 47   HEMOGLOBIN g/dL 8 2* 7 4* 7 3*   PLATELETS Thousands/uL 228 192 180     Results from last 7 days   Lab Units 01/11/23  0848 01/08/23  0539 01/07/23  1613 01/07/23  1114   POTASSIUM mmol/L 5 7*   < > 5 5* 4 7   CHLORIDE mmol/L 103   < > 101 103   CO2 mmol/L 26   < > 25 26   BUN mg/dL 49*   < > 57* 52*   CREATININE mg/dL 3 02*   < > 3 21* 3 21*   EGFR ml/min/1 73sq m 18   < > 17 17   CALCIUM mg/dL 9 0   < > 8 9 8 7   AST U/L 31  --  34 27   ALT U/L 21  --  24 23   ALK PHOS U/L 126*  --  96 92    < > = values in this interval not displayed  Results from last 7 days   Lab Units 01/07/23  1653 01/07/23  1613 01/07/23  1611 01/06/23  2030 01/06/23  1530   BLOOD CULTURE   --  No Growth at 72 hrs  No Growth at 72 hrs  Enterobacter cloacae*  --    GRAM STAIN RESULT   --   --   --  Gram negative rods*  --    URINE CULTURE  >100,000 cfu/ml Escherichia coli ESBL*  80,000-89,000 cfu/ml Enterococcus faecalis*  --   --   --  >100,000 cfu/ml - Presumptive Stenotrophomonas maltophilia*  50,000-59,000 cfu/ml Candida albicans*  >100,000 cfu/ml Pseudomonas putida group*  >100,000 cfu/ml Enterococcus faecalis*       Lab interpretation/comments: White count elevated today likely from procedures  Imaging interpretation/comments: No new images  Culture data: Repeat blood cultures remain without growth  Catheter tip culture so far without growth  Final urine cultures reviewed

## 2023-01-11 NOTE — ASSESSMENT & PLAN NOTE
Lab Results   Component Value Date    HGBA1C 7 2 (H) 10/20/2022     · Currently receiving Lantus 15 units with sliding scale TID  · ADA diet  · Family reported allergy to Humalog, discontinued Humalog and utilize Lantus only    Recent Labs     01/10/23  1103 01/10/23  1606 01/10/23  2136 01/11/23  0702   POCGLU 111 166* 198* 81       Blood Sugar Average: Last 72 hrs:  (P) 924 [id number]

## 2023-01-11 NOTE — ASSESSMENT & PLAN NOTE
· Patient has persistent hematuria due to bladder cancer  History of recurrent UTI on suppressive Bactrim  · CT A/P: Stable bladder wall thickening, compatible with known malignancy  Increased hyperdensity within the bladder lumen, compatible with new blood clot  · Recent admission at [facility] 12/12-12/22 for hematuria and obstructive uropathy in the setting of known bladder cancer s/p BCG and subsequent chemotherapy/radiation s/p bilateral chronic PCN tubes   Noted to have new right-sided hydronephrosis on CT s/p cystoscopy, clot evacuation w/ right ureteral stenting on 12/14 by Dr Patricia Redding  · UA bloody, innumerable RBC/WBC, +leuks, no nitrites  · Continue hand irrigation as needed  · CBI clamped yesterday, now with recurrent hematuria  · Monitor I&O  · Continue monitoring hemoglobin and transfuse less than 8  · Urine culture growing ESBL E  coli  · Urology following

## 2023-01-11 NOTE — ASSESSMENT & PLAN NOTE
· Patient tested + for influenza A prior to discharge to [facility] on [address]  · Asymptomatic therefore not treated   · Negative flu on Jan 7

## 2023-01-12 ENCOUNTER — HOSPITAL ENCOUNTER (OUTPATIENT)
Dept: INFUSION CENTER | Facility: HOSPITAL | Age: 78
End: 2023-01-12

## 2023-01-12 ENCOUNTER — TELEPHONE (OUTPATIENT)
Dept: OTHER | Facility: HOSPITAL | Age: 78
End: 2023-01-12

## 2023-01-12 PROBLEM — E43 SEVERE PROTEIN-CALORIE MALNUTRITION (HCC): Status: ACTIVE | Noted: 2023-01-12

## 2023-01-12 LAB
ALBUMIN SERPL BCP-MCNC: 2.1 G/DL (ref 3.5–5)
ALP SERPL-CCNC: 133 U/L (ref 46–116)
ALT SERPL W P-5'-P-CCNC: 23 U/L (ref 12–78)
ANION GAP SERPL CALCULATED.3IONS-SCNC: 10 MMOL/L (ref 4–13)
AST SERPL W P-5'-P-CCNC: 28 U/L (ref 5–45)
BACTERIA UR CULT: ABNORMAL
BACTERIA UR CULT: ABNORMAL
BASOPHILS # BLD AUTO: 0.08 THOUSANDS/ÂΜL (ref 0–0.1)
BASOPHILS NFR BLD AUTO: 1 % (ref 0–1)
BILIRUB SERPL-MCNC: 0.22 MG/DL (ref 0.2–1)
BUN SERPL-MCNC: 55 MG/DL (ref 5–25)
CALCIUM ALBUM COR SERPL-MCNC: 11 MG/DL (ref 8.3–10.1)
CALCIUM SERPL-MCNC: 9.5 MG/DL (ref 8.3–10.1)
CHLORIDE SERPL-SCNC: 105 MMOL/L (ref 96–108)
CO2 SERPL-SCNC: 26 MMOL/L (ref 21–32)
CREAT SERPL-MCNC: 3.12 MG/DL (ref 0.6–1.3)
EOSINOPHIL # BLD AUTO: 0.77 THOUSAND/ÂΜL (ref 0–0.61)
EOSINOPHIL NFR BLD AUTO: 6 % (ref 0–6)
ERYTHROCYTE [DISTWIDTH] IN BLOOD BY AUTOMATED COUNT: 15.6 % (ref 11.6–15.1)
GFR SERPL CREATININE-BSD FRML MDRD: 18 ML/MIN/1.73SQ M
GLUCOSE SERPL-MCNC: 129 MG/DL (ref 65–140)
GLUCOSE SERPL-MCNC: 150 MG/DL (ref 65–140)
GLUCOSE SERPL-MCNC: 165 MG/DL (ref 65–140)
GLUCOSE SERPL-MCNC: 166 MG/DL (ref 65–140)
GLUCOSE SERPL-MCNC: 191 MG/DL (ref 65–140)
HCT VFR BLD AUTO: 25.7 % (ref 36.5–49.3)
HGB BLD-MCNC: 8 G/DL (ref 12–17)
IMM GRANULOCYTES # BLD AUTO: 0.08 THOUSAND/UL (ref 0–0.2)
IMM GRANULOCYTES NFR BLD AUTO: 1 % (ref 0–2)
LYMPHOCYTES # BLD AUTO: 2.24 THOUSANDS/ÂΜL (ref 0.6–4.47)
LYMPHOCYTES NFR BLD AUTO: 18 % (ref 14–44)
MCH RBC QN AUTO: 29.2 PG (ref 26.8–34.3)
MCHC RBC AUTO-ENTMCNC: 31.1 G/DL (ref 31.4–37.4)
MCV RBC AUTO: 94 FL (ref 82–98)
MONOCYTES # BLD AUTO: 0.79 THOUSAND/ÂΜL (ref 0.17–1.22)
MONOCYTES NFR BLD AUTO: 6 % (ref 4–12)
NEUTROPHILS # BLD AUTO: 8.81 THOUSANDS/ÂΜL (ref 1.85–7.62)
NEUTS SEG NFR BLD AUTO: 68 % (ref 43–75)
NRBC BLD AUTO-RTO: 0 /100 WBCS
PLATELET # BLD AUTO: 241 THOUSANDS/UL (ref 149–390)
PMV BLD AUTO: 9.6 FL (ref 8.9–12.7)
POTASSIUM SERPL-SCNC: 4.7 MMOL/L (ref 3.5–5.3)
PROT SERPL-MCNC: 7 G/DL (ref 6.4–8.4)
RBC # BLD AUTO: 2.74 MILLION/UL (ref 3.88–5.62)
SODIUM SERPL-SCNC: 141 MMOL/L (ref 135–147)
WBC # BLD AUTO: 12.77 THOUSAND/UL (ref 4.31–10.16)

## 2023-01-12 RX ORDER — LEVOFLOXACIN 750 MG/1
750 TABLET ORAL EVERY OTHER DAY
Status: COMPLETED | OUTPATIENT
Start: 2023-01-12 | End: 2023-01-16

## 2023-01-12 RX ORDER — LEVOFLOXACIN 750 MG/1
750 TABLET ORAL EVERY OTHER DAY
Status: DISCONTINUED | OUTPATIENT
Start: 2023-01-12 | End: 2023-01-12

## 2023-01-12 RX ORDER — SENNOSIDES 8.6 MG
2 TABLET ORAL 2 TIMES DAILY PRN
Status: DISCONTINUED | OUTPATIENT
Start: 2023-01-12 | End: 2023-01-24 | Stop reason: HOSPADM

## 2023-01-12 RX ADMIN — METOPROLOL TARTRATE 25 MG: 25 TABLET, FILM COATED ORAL at 21:14

## 2023-01-12 RX ADMIN — ACETAMINOPHEN 975 MG: 325 TABLET, FILM COATED ORAL at 05:11

## 2023-01-12 RX ADMIN — HYDROMORPHONE HYDROCHLORIDE 0.2 MG: 0.2 INJECTION, SOLUTION INTRAMUSCULAR; INTRAVENOUS; SUBCUTANEOUS at 21:13

## 2023-01-12 RX ADMIN — ACETAMINOPHEN 975 MG: 325 TABLET, FILM COATED ORAL at 13:01

## 2023-01-12 RX ADMIN — B-COMPLEX W/ C & FOLIC ACID TAB 1 TABLET: TAB at 09:27

## 2023-01-12 RX ADMIN — OXYCODONE HYDROCHLORIDE 10 MG: 10 TABLET ORAL at 09:27

## 2023-01-12 RX ADMIN — INSULIN GLARGINE 15 UNITS: 100 INJECTION, SOLUTION SUBCUTANEOUS at 21:14

## 2023-01-12 RX ADMIN — ACETAMINOPHEN 975 MG: 325 TABLET, FILM COATED ORAL at 21:14

## 2023-01-12 RX ADMIN — OXYBUTYNIN CHLORIDE 5 MG: 5 TABLET ORAL at 21:14

## 2023-01-12 RX ADMIN — HYDROMORPHONE HYDROCHLORIDE 0.2 MG: 0.2 INJECTION, SOLUTION INTRAMUSCULAR; INTRAVENOUS; SUBCUTANEOUS at 13:29

## 2023-01-12 RX ADMIN — CALCITRIOL 0.25 MCG: 0.25 CAPSULE, LIQUID FILLED ORAL at 09:27

## 2023-01-12 RX ADMIN — BIMATOPROST 1 DROP: 0.1 SOLUTION/ DROPS OPHTHALMIC at 21:14

## 2023-01-12 RX ADMIN — LEVOFLOXACIN 750 MG: 750 TABLET, FILM COATED ORAL at 09:29

## 2023-01-12 RX ADMIN — OXYBUTYNIN CHLORIDE 5 MG: 5 TABLET ORAL at 16:50

## 2023-01-12 RX ADMIN — CYANOCOBALAMIN TAB 500 MCG 1000 MCG: 500 TAB at 09:28

## 2023-01-12 RX ADMIN — GABAPENTIN 300 MG: 300 CAPSULE ORAL at 21:14

## 2023-01-12 RX ADMIN — OXYBUTYNIN CHLORIDE 5 MG: 5 TABLET ORAL at 09:29

## 2023-01-12 RX ADMIN — CEFEPIME HYDROCHLORIDE 1000 MG: 1 INJECTION, POWDER, FOR SOLUTION INTRAMUSCULAR; INTRAVENOUS at 00:56

## 2023-01-12 RX ADMIN — PANTOPRAZOLE SODIUM 20 MG: 20 TABLET, DELAYED RELEASE ORAL at 05:11

## 2023-01-12 RX ADMIN — ARIPIPRAZOLE 2 MG: 2 TABLET ORAL at 09:28

## 2023-01-12 RX ADMIN — EZETIMIBE 10 MG: 10 TABLET ORAL at 09:28

## 2023-01-12 RX ADMIN — HYDROMORPHONE HYDROCHLORIDE 0.2 MG: 0.2 INJECTION, SOLUTION INTRAMUSCULAR; INTRAVENOUS; SUBCUTANEOUS at 00:57

## 2023-01-12 RX ADMIN — METOPROLOL TARTRATE 25 MG: 25 TABLET, FILM COATED ORAL at 09:29

## 2023-01-12 RX ADMIN — OXYCODONE HYDROCHLORIDE 10 MG: 10 TABLET ORAL at 16:50

## 2023-01-12 RX ADMIN — FUROSEMIDE 20 MG: 20 TABLET ORAL at 09:29

## 2023-01-12 NOTE — TELEPHONE ENCOUNTER
Pt remains inpatient at this time  Will monitor for discharge to arrange for office that is recommended

## 2023-01-12 NOTE — CASE MANAGEMENT
Case Management Discharge Planning Note    Patient name Asher Layton  Location [facility] /E5 [address]-* MRN [id number]  : 1945 Date 2023       Current Admission Date: 2023  Current Admission Diagnosis:Bacteremia due to Enterobacter species   Patient Active Problem List    Diagnosis Date Noted   • History of pleural effusion 2023   • Nephrostomy status ([address] ) 2023   • Acute on chronic blood loss anemia 2023   • Bacteremia due to Enterobacter species 2023   • Elevated brain natriuretic peptide (BNP) level 2023   • Ambulatory dysfunction 2023   • Influenza A 2023   • Elevated troponin 2023   • Right sided weakness 2022   • Stroke-like symptoms 2022   • Symptomatic hypotension 2022   • Insomnia 2022   • Right wrist drop 2022   • Chronic pleural effusion 2022   • UTI (urinary tract infection) 2022   • SOB (shortness of breath) 2022   • History of tobacco use disorder 2022   • Retroperitoneal lymphadenopathy 2022   • Pulmonary nodules 2022   • Syncope and collapse 2022   • Depression with suicidal ideation 2022   • Cancer related pain 2022   • Bilateral hydronephrosis 04/10/2022   • Stage 4 chronic kidney disease ([address] ) 2022   • Fall 2022   • Hyperkalemia 2022   • Retained ureteral stent    • 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[facility] ) 2022   • Embolism and 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 ([facility] ) 2022   • Abnormal CT scan, bladder 2022   • Port-A-Cath in place 2022   • Continuous opioid dependence ([address] ) 2021   • Hematuria 10/24/2021   • Acute urinary retention 10/21/2021   • Chronic pain syndrome 2021   • Lumbar spondylosis    • Chronic bronchitis ([address] ) 2021   • Moderate major depression, single episode ([place] [address] ) 2021   • Thrombocytopenia ([place] [address] ) 2021   • 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 03/02/2021   • Gross hematuria 02/09/2021   • PAD (peripheral artery disease) (HCC)    • Left carotid bruit    • Anemia of chronic disease 12/19/2020   • Preoperative clearance 12/19/2020   • Symptomatic anemia 10/10/2020   • Diabetic ulcer of left foot associated with type 2 diabetes mellitus, 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 ([address] ) 10/08/2020   • Acute kidney injury superimposed on CKD ([address] )    • Dysuria 08/17/2020   • Diabetic polyneuropathy associated with type 2 diabetes mellitus ([address] ) 07/16/2020   • Abnormal MRI, lumbar spine 06/29/2020   • Malignant neoplasm of anterior wall of urinary bladder ([address] )    • Secondary renal hyperparathyroidism ([address] ) 02/12/2020   • Preop examination 04/16/2019   • Superficial phlebitis 03/07/2019   • Arteriosclerosis of artery of extremity ([address] ) 02/22/2019   • Stable angina pectoris ([address] ) 02/22/2019   • Herpes zoster without complication 93/61/2199   • Localized edema 02/22/2019   • Back pain 01/31/2019   • Degeneration of lumbar intervertebral disc 12/03/2018   • Primary osteoarthritis of left knee 03/16/2018   • Bladder carcinoma ([address] ) 08/16/2016   • Presence of stent in coronary artery 08/16/2016   • Hypertension with renal disease 10/29/2015   • Hyperlipidemia 10/29/2015   • Cystitis due to intravesical BCG administration 09/24/2015   • Coronary artery disease of native artery of native heart with stable angina pectoris ([address] ) 05/19/2011   • Type 2 diabetes mellitus, with long-term current use of insulin ([address] ) 01/09/2004      LOS (days): 5  Geometric Mean LOS (GMLOS) (days): 5 10  Days to GMLOS:0 5     OBJECTIVE:  Risk of Unplanned Readmission Score: 62 94         Current admission status: Inpatient   Preferred Pharmacy:   [address] [place] [address]   [address] [address]  Phone: [phone] Fax: [address], [address] [facility] [address]  [address] [place] [address] [place] [address]  Phone: [phone] Fax: [phone]    Primary Care Provider: Archie Rogers MD    Primary Insurance: MEDICARE  Secondary Insurance: BLUE CROSS    DISCHARGE DETAILS:    CM met with patient and patient's sister at bedside and provided subacute and acute rehab choices  Patient did not listen or participate with any of the discharge planning or the rehab choices  The sister states patient will need home health aides and that he is a   CM called the [place] and spoke with Vladimir Rosas in community care  Vladimir Rosas states the referral needs to go through the PCP 1st Dr Leandro Serrano  The  took the referral for the PCP and took CM's information for the MD to contact  CM will await patient & sister's decision regarding rehab vs home w/ home health

## 2023-01-12 NOTE — PHYSICAL THERAPY NOTE
Physical Therapy Cancellation Note               01/12/23 1324   PT Last Visit   PT Visit Date 01/12/23   Note Type   Note Type Treatment for insurance authorization   Cancel Reasons Refusal     attempted to see patient per CM request  pt agitated that he is not receiving enough pain medication  I spoke to his nurse who gave pt IV pain medication given 8/10 groin pain  pt continues to be agitated w staff and sister who is visiting despite receiving requested pain medication  Refusing to participate in therapy including mobility or exercise  CM, RN aware        Isidro Maldonado, PT

## 2023-01-12 NOTE — ASSESSMENT & PLAN NOTE
· Patient was transferred from [facility] due to fever and positive blood cultures  · Blood culture growing Enterobacter  · Port-A-Cath removed by interventional radiology, follow-up culture of tip  · Repeat blood cultures no growth at 72 hours  · Spoke with ID    They are changing Cefepime to oral levaquin    Patient can be discharged once we have rehab placement

## 2023-01-12 NOTE — PROGRESS NOTES
Progress Note - Urology  Silver Keane 1945, 76 y o  male MRN: [id number]    Unit/Bed#: E5 -01 Encounter: [id number]    Assessment/Plan:  1  High grade treatment refractory bladder cancer from 1995 to current, several intravesical therapies in those decades, now s/p chemoradiation 2021; malignant bilateral ureteral obstruction managed with bilateral nephrostomy tubes in addition to right ureteral stent for upper tract bleeding tamponade attempt new stent from 12/14/22- plan was for ureteral stent exchange in 3 months with possible TURBT at that time  On immunotherapy managed by medical oncology  Bilateral PCN exchanges with IR on 1/10, draining clear yellow urine today  2  Symptomatic anemia with near syncope- resolved s/p blood transfusion 1/2/23; BP remains stable in the 1302/80s  Denies dizziness  Hgb 8 0 today without intervention      3  Enterobacter cloacae bacteremia- presumed urinary source though his usual urinary organism is e coli  His port-a-cath was removed and cultured 1/10 also as possible source but had no issues with it  Tolerating antibiotics well      4  Gross hematuria- Patient has remained off CBI with only manual irrigations the past 2 days  Urine cherry punch in bag, but clear light peach in tubing  Manually irrigating well with minimal, small clots improved from bigger more abundant clots yesterday per nursing  Drains well  Plan continue q4h hand irrigation or sooner if needed for tube patency  Continue home rx ditropan 5mg TID for bladder spasm  Unfortunately he will probably continue to have some degree of hematuria as a result of his indwelling ureteral stent (for hydroureteronephrosis despite pcn) in combination with radiation cystitis (bladder radiation 2021)  Hopeful this will improve if removal of the ureteral stent at the next planned interval as long as his kidney function does okay without the stent   Will confirm previous plan to maintain stent x 3 months with on call Attending       5  Influenza A positive- per primary medical team  left sidef pleurX in place  No fevers in several days  Subjective:   HPI: Patient denies any penile or abdominal pain this morning  He offers no complaints at this time  Review of Systems   Constitutional: Negative for chills and fever  HENT: Negative  Respiratory: Negative  Cardiovascular: Negative  Gastrointestinal: Negative for abdominal pain, nausea and vomiting  Genitourinary: Positive for hematuria (light peach)  Negative for dysuria, flank pain, penile pain, scrotal swelling and testicular pain  Musculoskeletal: Negative  Skin: Negative  Neurological: Negative  Objective:  Nursing Rounds: Plan discussed with Devang Plata RN  Vitals: Blood pressure 139/81, pulse 83, temperature 97 7 °F (36 5 °C), temperature source Axillary, resp  rate 20, height 6' 4" (1 93 m), weight 87 7 kg (193 lb 5 5 oz), SpO2 98 %  ,Body mass index is 23 53 kg/m²  Physical Exam  Vitals reviewed  Constitutional:       General: He is not in acute distress  Appearance: Normal appearance  He is not ill-appearing, toxic-appearing or diaphoretic  HENT:      Head: Normocephalic and atraumatic  Eyes:      Conjunctiva/sclera: Conjunctivae normal    Cardiovascular:      Rate and Rhythm: Normal rate and regular rhythm  Heart sounds: Normal heart sounds  Pulmonary:      Effort: Pulmonary effort is normal  No respiratory distress  Abdominal:      General: There is no distension  Palpations: Abdomen is soft  Tenderness: There is no abdominal tenderness  There is no right CVA tenderness, left CVA tenderness, guarding or rebound  Comments: Bilateral PCNs patent with clear yellow output  Genitourinary:     Comments: Wagner catheter patent with clear peach colored urine in wagner tubing  Minimal small clots returned on manual irrigation this morning  Urine in wagner collection bag is fruit punch in color  Musculoskeletal:         General: Normal range of motion  Cervical back: Normal range of motion  Skin:     General: Skin is warm and dry  Neurological:      General: No focal deficit present  Mental Status: He is alert and oriented to person, place, and time  Psychiatric:         Mood and Affect: Mood normal          Behavior: Behavior normal          Thought Content: Thought content normal          Judgment: Judgment normal        Imaging:  No new imaging  Labs:  Recent Labs     01/10/23  0503 01/11/23  0848 01/12/23  0541   WBC 11 60* 14 93* 12 77*     Recent Labs     01/10/23  0503 01/11/23  0848 01/11/23  1636 01/12/23  0541   HGB 7 4* 8 2* 8 1* 8 0*     Recent Labs     01/10/23  0503 01/11/23  0848 01/11/23  1636 01/12/23  0541   HCT 23 2* 25 3* 25 3* 25 7*     Recent Labs     01/10/23  0503 01/11/23  0848 01/12/23  0541   CREATININE 2 95* 3 02* 3 12*     Urine Culture >100,000 cfu/ml Escherichia coli ESBL Abnormal     An Extended-Spectrum Beta-Lactamase is being produced by this organism (requires contact precautions)  Cephalosporins are NOT effective for treating these organisms  For SEVERE infections (i e  bacteremia, septic shock, etc ), Carbapenems are the drug of choice  For MILD infections (i e  isolated urinary or biliary infection), high-dose Zosyn may be used  This organism has been edited  The previous result was Klebsiella-Enterobacter  group on 1/9/2023 at 1151 EST  This organism has been edited  The previous result was Escherichia coli on 1/9/2023 at 1409 EST    80,000-89,000 cfu/ml Enterococcus faecalis Abnormal     This organism has been edited  The previous result was Gamma Hemolytic Streptococcus on 1/10/2023 at 1447 EST          Susceptibility     Escherichia coli ESBL Enterococcus faecalis     ERIC ERIC     Amoxicillin + Clavulanate >16/8 ug/ml Resistant       Ampicillin ($$) >16 00 ug/ml Resistant <=2 00 ug/ml Susceptible     Ampicillin + Sulbactam ($) >16/8 ug/ml Resistant       Aztreonam ($$$)  >16 ug/ml Resistant       Cefazolin ($) >16 00 ug/ml Resistant       Cefepime ($) >16 00 ug/ml Resistant       Ceftazidime ($$) >16 ug/ml Resistant       Ceftriaxone ($$) >32 00 ug/ml Resistant       Cefuroxime ($$) >16 ug/ml Resistant       Ciprofloxacin ($)  <=0 25 ug/ml Susceptible       Ertapenem ($$$) <=0 5 ug/ml Susceptible       Gentamicin ($$) <=2 ug/ml Susceptible       Levofloxacin ($) <=0 50 ug/ml Susceptible >4 00 ug/ml Resistant     Nitrofurantoin <=32 ug/ml Susceptible <=32 ug/ml Susceptible     Piperacillin + Tazobactam ($$$) <=8 ug/ml Susceptible       Tetracycline <=4 ug/ml Susceptible >8 ug/ml Resistant     Tobramycin ($) <=2 ug/ml Susceptible       Trimethoprim + Sulfamethoxazole ($$$) <=0 5/9 5 u   Susceptible       Vancomycin ($)   1 00 ug/ml Susceptible     ZID Performed Yes   Yes                      Specimen Collected: 01/07/23 16:53 Last Resulted: 01/12/23 08:03              Blood cultures x 2: No growth after 4 days     History:  Past Medical History:   Diagnosis Date   • Anemia     Last assessed: 9/28/17   • Anxiety    • Arteriosclerotic cardiovascular disease     Last assessed: 9/28/17   • Arthritis    • Bladder cancer ([facility] )     bladder- had BCG treatments   • Chronic kidney disease     Stage IV   • CKD (chronic kidney disease) stage 4, GFR 15-29 ml/min ([facility])    • Colon polyp    • Coronary artery disease     7 stents   • Depression    • Diabetes mellitus ([facility])     IDDM   • GERD (gastroesophageal reflux disease)    • Glaucoma    • Hematuria    • History of fusion of cervical spine    • Hyperlipidemia    • Hypertension    • Insomnia     Last assessed: 11/14/12   • Loss of hearing     has hearing aids but usually does not wear them   • Lung cancer [facility])    • Metastatic cancer ([facility] )    • Other seasonal allergic rhinitis     Last assessed: 2/10/16   • PAD (peripheral artery disease) ([facility])    • Shortness of breath     on exertion   • Spinal stenosis of lumbar region    • Transient cerebral ischemia     No Residual   • Uses walker     w/c for longer distances     Social History     Socioeconomic History   • Marital status: /Civil Union     Spouse name: Not on file   • Number of children: Not on file   • Years of education: Not on file   • Highest education level: Not on file   Occupational History   • Not on file   Tobacco Use   • Smoking status: Former     Packs/day: 3 00     Years: 27 00     Pack years: 81 00     Types: Cigarettes     Quit date: 5     Years since quittin 0   • Smokeless tobacco: Never   Vaping Use   • Vaping Use: Never used   Substance and Sexual Activity   • Alcohol use: Never     Comment: beer / liquor   • Drug use: Not Currently     Types: Marijuana     Comment: quit 2019 had medical marijuana   • Sexual activity: Not Currently   Other Topics Concern   • Not on file   Social History Narrative    Consumes 1 cup of coffee and 1 soda per day     Social Determinants of Health     Financial Resource Strain: Not on file   Food Insecurity: No Food Insecurity   • Worried About Running Out of Food in the Last Year: Never true   • Ran Out of Food in the Last Year: Never true   Transportation Needs: No Transportation Needs   • Lack of Transportation (Medical): No   • Lack of Transportation (Non-Medical): No   Physical Activity: Not on file   Stress: Not on file   Social Connections: Not on file   Intimate Partner Violence: Not on file   Housing Stability: Low Risk    • Unable to Pay for Housing in the Last Year: No   • Number of Places Lived in the Last Year: 1   • Unstable Housing in the Last Year: No     Past Surgical History:   Procedure Laterality Date   • CARDIAC SURGERY      Cath stent placement  Last assessed: 3/9/17  Interventional Catheterization   • CHOLECYSTECTOMY     • COLONOSCOPY     • CYSTOSCOPY      Diagnostic w/biopsy  Remus Sand  Last assessed: 14   • CYSTOSCOPY N/A 2022    Procedure: CYSTOSCOPY   Bladder biopsies  ;  Surgeon: Mary Beth Shah MD;  [facility];  Service: Urology   • CYSTOSCOPY W/ RETROGRADES Right 03/01/2022    Procedure: CYSTO; stent removal retrograde;  Surgeon: Mary Beth Shah MD;  [facility];  Service: Urology   • CYSTOSCOPY W/ URETERAL STENT PLACEMENT Bilateral 10/18/2021    Procedure: bilateral retrogrades, cytology collection;  Surgeon: Mary Beth Shah MD;  [facility];  Service: Urology   • CYSTOURETHROSCOPY      w/cautery  Anam Spears   • FL RETROGRADE PYELOGRAM  10/18/2021   • FL RETROGRADE PYELOGRAM  10/24/2021   • FL RETROGRADE PYELOGRAM  12/14/2022   • GASTRIC BYPASS      For morbid obesity w/Shaji-en-Y   Resolved: 11/17/09   • INCISION AND DRAINAGE OF WOUND Right 02/26/2017    Procedure: INCISION AND DRAINAGE (I&D) EXTREMITY WITH APPLICATION OF GRAFT JACKET;  Surgeon: Susette Severin, DPM;  [facility];  Service:    • INCISION AND DRAINAGE OF WOUND Right 04/25/2017    Procedure: INCISION AND DRAINAGE (I&D) EXTREMITY, APPLICATION OF GRAFT;  Surgeon: Susette Severin, DPM;  [facility];  Service:    • IR BIOPSY OTHER  07/02/2020   • IR LOWER EXTREMITY ANGIOGRAM  02/08/2021   • IR LOWER EXTREMITY ANGIOGRAM  02/11/2021   • IR NEPHROSTOMY TUBE CHECK/CHANGE/REPOSITION/REINSERTION/UPSIZE  04/28/2022   • IR NEPHROSTOMY TUBE CHECK/CHANGE/REPOSITION/REINSERTION/UPSIZE  05/24/2022   • IR NEPHROSTOMY TUBE CHECK/CHANGE/REPOSITION/REINSERTION/UPSIZE  06/07/2022   • IR NEPHROSTOMY TUBE CHECK/CHANGE/REPOSITION/REINSERTION/UPSIZE  07/28/2022   • IR NEPHROSTOMY TUBE CHECK/CHANGE/REPOSITION/REINSERTION/UPSIZE  11/15/2022   • IR NEPHROSTOMY TUBE CHECK/CHANGE/REPOSITION/REINSERTION/UPSIZE  1/10/2023   • IR NEPHROSTOMY TUBE PLACEMENT  02/25/2022   • IR PORT PLACEMENT  01/17/2022   • IR PORT REMOVAL  1/10/2023   • IR THORACENTESIS  09/02/2022   • IR THORACENTESIS  09/14/2022   • IR THORACENTESIS WITH TUBE PLACEMENT Left     october   • IR TUNNELED CENTRAL LINE PLACEMENT  12/24/2020   • JOINT REPLACEMENT      christofer knees replaced   • CA AMPUTATION METATARSAL W/TOE SINGLE Left 12/21/2020    Procedure: RAY RESECTION FOOT;  Surgeon: Pete Quintanilla DPM;  [facility];  Service: Podiatry   • CA AMPUTATION METATARSAL W/TOE SINGLE Left 12/31/2020    Procedure: 5TH MET RESECTION;  Surgeon: Pete Quintanilla DPM;  [facility];  Service: Podiatry   • CA CYSTO BLADDER W/URETERAL CATHETERIZATION Right 12/14/2022    Procedure: CYSTOSCOPY WITH RETROGRADE PYELOGRAM and CLOT EVACUATION, RIGHT STENT INSERTION, FULGRATION OF BLEEDING POINTS;  Surgeon: Kashif Gonzalez MD;  [facility];  Service: Urology   • CA CYSTO W/IRRIG & EVAC MULTPLE OBSTRUCTING CLOTS N/A 02/10/2021    Procedure: CYSTOSCOPY EVACUATION OF CLOTS, fulguration;  Surgeon: Petros Priest MD;  [facility];  Service: Urology   • CA CYSTO W/IRRIG & EVAC MULTPLE OBSTRUCTING CLOTS N/A 10/24/2021    Procedure: CYSTOSCOPY EVACUATION OF CLOT, fulguration of bleeding vessels, right ureter stent placement, retrograde pyelogram;  Surgeon: Jose Carlisle MD;  [facility];  Service: Urology   • CA CYSTO W/REMOVAL OF LESIONS SMALL N/A 11/19/2020    Procedure: CYSTO W/BIOPSIES, transurethral prostate bx;  Surgeon: Marky Burroughs MD;  [facility];  Service: Urology   • CA CYSTOURETHROSCOPY W/DEST &/RMVL TUMOR LARGE Bilateral 10/18/2021    Procedure: TRANSURETHRAL RESECTION OF BLADDER TUMOR (TURBT);   Surgeon: Rahat Doe MD;  [facility];  Service: Urology   • CA CYSTOURETHROSCOPY WITH BIOPSY N/A 08/16/2016    Procedure: Arna Suggs;  Surgeon: Nicole Yee MD;  [facility];  Service: Urology   • CA Darnell Men 3RD+ ORD [address] PEL/LXTR [facility] Left 02/08/2021    Procedure: LEG angiogram, CO2 w/limited contrast with balloon angioplasty postertior tibial artery;  Surgeon: Henrik Mccabe MD;  [facility];  Service: Vascular   • ROTATOR CUFF REPAIR     • SMALL INTESTINE SURGERY      Surgery Shaji-en-Y   • SPINAL FUSION      lumbar and cervical fusions   • VAC DRESSING APPLICATION Right 32/54/2890    Procedure: APPLICATION VAC DRESSING;  Surgeon: Nuzhat Pavon DPM;  [facility];  Service:    • WOUND DEBRIDEMENT Left 02/16/2021    Procedure: FOOT DEBRIDE, [address] OUT w/graft application;  Surgeon: Nuzhat Pavon DPM;  [facility];  Service: Podiatry     Family History   Problem Relation Age of Onset   • Diabetes Mother    • Heart disease Mother    • Other Mother         High blood pressure   • Heart disease Father    • Diabetes Sister    • Other Sister         High blood pressure   • Kidney disease Sister    • Heart disease Brother    • Other Brother         High blood pressure       Oswald Diaz [place]  Date: 1/12/2023 Time: 11:17 AM

## 2023-01-12 NOTE — ASSESSMENT & PLAN NOTE
Lab Results   Component Value Date    HGBA1C 7 2 (H) 10/20/2022     · Currently receiving Lantus 15 units with sliding scale TID  · ADA diet  · Family reports allergy to humalog, so that is not being used    Recent Labs     01/11/23  1601 01/11/23  2048 01/12/23  0738 01/12/23  1130   POCGLU 164* 371* 166* 150*       Blood Sugar Average: Last 72 hrs:  (P) 165

## 2023-01-12 NOTE — PROGRESS NOTES
Progress Note - [facility] Infectious Disease   Mayda Redmond 76 y o  male MRN: [id number]  Unit/Bed#: E5 -01 Encounter: [id number]      IMPRESSION & RECOMMENDATIONS:   1   Enterobacter cloacae bacteremia   1 of 2 blood culture sets collected 1/6/2023 showed growth of Enterobacter cloacae  Urine cultures this time ultimately did not isolate Enterobacter but have isolated in the past  Suspicion remains for possible transient urinary source for his bacteremia but ultimately could not rule out impact or role of the patient's port  CT imaging though without contrast was largely unremarkable  Nephrostomy tubes exchanged and port removed on 1/10  Mild elevation in white count likely from procedure and ongoing hematuria  Repeat cultures remain without growth  Recent EKG reviewed and unremarkable  Patient confirmed tolerance of Levaquin in the past and drug administration reviewed with pt   -continue PO Levaquin 750 mg every 48 hours for total of 7 days of therapy through 1/17  -continue follow up with urology  -Additional support care/discharge as per primary     2   Metastatic high-grade treatment refractory bladder cancer s/p chemoradiation 2021  Patient is currently on immunotherapy with Enfortumab  Complicated by malignant bilateral ureteral obstruction s/p bilateral percutaneous nephrostomies and right ureteral stent placement  Patient has left pleural catheter in place  Patient has been seen inpatient by urology and oncology   -continue follow up with urology  -continue follow up with oncology      3   CKD stage IV   Creatinine currently at baseline  Estimated Creatinine Clearance: 23 6 mL/min (A) (by C-G formula based on SCr of 3 12 mg/dL (H))  -monitor creatinine  -Levaquin dosing as above     4   Acute on chronic blood loss anemia   Patient has history of recurrent hematuria due to bladder cancer   Requires intermittent blood transfusions    -monitor CBCD  -transfusion support per primary/ongolocy     5  Influenza A   Positive on 1/3/2023, asymptomatic  Repeat testing here was negative  No further isolation      6  Type 2 diabetes mellitus with long-term use of insulin  Patient's last HbA1c was 7 2% on 10/20/2022    -blood glucose management per primary service    Antibiotics:  Cefepime 5  abx 5    Infectious disease consultation service will sign off for now  Please call if any new signs or symptoms of infection develop  Thank you! I have discussed the above management plan in detail with patient  I have discussed the above management plan in detail with patient's RN, and the primary service, SLIM  Subjective:  Patient has no fever, chills, sweats; no nausea, vomiting, diarrhea; no cough, shortness of breath; having bladder spasms and hematuria again  He is tolerating PO levaquin  Objective:  Vitals:  Temp:  [97 7 °F (36 5 °C)-98 7 °F (37 1 °C)] 97 7 °F (36 5 °C)  HR:  [75-87] 83  Resp:  [16-20] 20  BP: (115-152)/(68-88) 139/81  SpO2:  [98 %-99 %] 98 %  Temp (24hrs), Av 2 °F (36 8 °C), Min:97 7 °F (36 5 °C), Max:98 7 °F (37 1 °C)  Current: Temperature: 97 7 °F (36 5 °C)    PHYSICAL EXAM:  General Appearance:  Appearing chronically ill and debilitated, and in mild distress suspect due to pain    HEENT: Normocephalic, without obvious abnormality, atraumatic  Conjunctiva pink and sclera anicteric  Oropharynx moist without lesions  Lungs:   Clear to auscultation bilaterally, respirations unlabored   Heart:  RRR; no murmur, rub or gallop   Abdomen:   Soft, non-tender, non-distended, positive bowel sounds    Extremities: No cyanosis, clubbing or edema   Musculoskeletal: Back symmetric without curvature, ROM normal     : Bilateral PCNs intact with wagner patent for pink tinged urine with small clots in tubing   Skin: No rashes or lesions  No draining wounds noted  Site of prior chest wall port unremarkable   Peripheral IV intact without evidence of erythema, warmth, or exudate  LABS, IMAGING, & OTHER STUDIES:  Lab Results:  I have personally reviewed pertinent labs  Results from last 7 days   Lab Units 01/12/23  0541 01/11/23  1636 01/11/23  0848 01/10/23  0503   WBC Thousand/uL 12 77*  --  14 93* 11 60*   HEMOGLOBIN g/dL 8 0* 8 1* 8 2* 7 4*   PLATELETS Thousands/uL 241  --  228 192     Results from last 7 days   Lab Units 01/12/23  0541 01/11/23  1636 01/11/23  0848 01/10/23  0503 01/08/23  0539 01/07/23  1613   SODIUM mmol/L 141  --  138 138   < > 135   POTASSIUM mmol/L 4 7   < > 5 7* 4 5   < > 5 5*   CHLORIDE mmol/L 105  --  103 105   < > 101   CO2 mmol/L 26  --  26 26   < > 25   BUN mg/dL 55*  --  49* 48*   < > 57*   CREATININE mg/dL 3 12*  --  3 02* 2 95*   < > 3 21*   EGFR ml/min/1 73sq m 18  --  18 19   < > 17   CALCIUM mg/dL 9 5  --  9 0 8 3   < > 8 9   AST U/L 28  --  31  --   --  34   ALT U/L 23  --  21  --   --  24   ALK PHOS U/L 133*  --  126*  --   --  96    < > = values in this interval not displayed  Results from last 7 days   Lab Units 01/07/23  1653 01/07/23  1613 01/07/23  1611 01/06/23  2030 01/06/23  1530   BLOOD CULTURE   --  No Growth After 4 Days  No Growth After 4 Days   Enterobacter cloacae*  --    GRAM STAIN RESULT   --   --   --  Gram negative rods*  --    URINE CULTURE  >100,000 cfu/ml Escherichia coli ESBL*  80,000-89,000 cfu/ml Enterococcus faecalis*  --   --   --  >100,000 cfu/ml - Presumptive Stenotrophomonas maltophilia*  50,000-59,000 cfu/ml Candida albicans*  >100,000 cfu/ml Pseudomonas putida group*  >100,000 cfu/ml Enterococcus faecalis*     Results from last 7 days   Lab Units 01/07/23  1114 01/06/23  0704   PROCALCITONIN ng/ml 0 65* 0 47*         Results from last 7 days   Lab Units 01/07/23  1613   FERRITIN ng/mL 217

## 2023-01-12 NOTE — ASSESSMENT & PLAN NOTE
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, recurrence with CIS in 2016 BCG again  BCG refractory disease with pelvic metastases now s/p chemoradiation 2021, on current immunotherapy  · Chronic B/L PCN tubes  · CT A/P Jan 7: Stable bladder wall thickening, compatible with known malignancy  Stable metastatic retroperitoneal lymphadenopathy  Stable epicardial metastasis  · Per oncology note on Jan 6: Chemotherapy treatment should remain on hold for now untill he is clinically stable/discharged; particularly since he is positive flu  To follow-up with primary oncologist after discharge for reevaluation prior to resume treatment    · Port removed with bacteremia

## 2023-01-12 NOTE — PLAN OF CARE
Problem: Nutrition/Hydration-ADULT  Goal: Nutrient/Hydration intake appropriate for improving, restoring or maintaining nutritional needs  Description: Monitor and assess patient's nutrition/hydration status for malnutrition  Collaborate with interdisciplinary team and initiate plan and interventions as ordered  Monitor patient's weight and dietary intake as ordered or per policy  Utilize nutrition screening tool and intervene as necessary  Determine patient's food preferences and provide high-protein, high-caloric foods as appropriate       INTERVENTIONS:  - Monitor oral intake, urinary output, labs, and treatment plans  - Assess nutrition and hydration status and recommend course of action  - Evaluate amount of meals eaten  - Assist patient with eating if necessary   - Allow adequate time for meals  - Recommend/ encourage appropriate diets, oral nutritional supplements, and vitamin/mineral supplements  - Order, calculate, and assess calorie counts as needed  - Recommend, monitor, and adjust tube feedings and TPN/PPN based on assessed needs  - Assess need for intravenous fluids  - Provide specific nutrition/hydration education as appropriate  - Include patient/family/caregiver in decisions related to nutrition  Outcome: Progressing     Problem: Prexisting or High Potential for Compromised Skin Integrity  Goal: Skin integrity is maintained or improved  Description: INTERVENTIONS:  - Identify patients at risk for skin breakdown  - Assess and monitor skin integrity  - Assess and monitor nutrition and hydration status  - Monitor labs   - Assess for incontinence   - Turn and reposition patient  - Assist with mobility/ambulation  - Relieve pressure over bony prominences  - Avoid friction and shearing  - Provide appropriate hygiene as needed including keeping skin clean and dry  - Evaluate need for skin moisturizer/barrier cream  - Collaborate with interdisciplinary team   - Patient/family teaching  - Consider wound care consult   Outcome: Progressing     Problem: MOBILITY - ADULT  Goal: Maintain or return to baseline ADL function  Description: INTERVENTIONS:  -  Assess patient's ability to carry out ADLs; assess patient's baseline for ADL function and identify physical deficits which impact ability to perform ADLs (bathing, care of mouth/teeth, toileting, grooming, dressing, etc )  - Assess/evaluate cause of self-care deficits   - Assess range of motion  - Assess patient's mobility; develop plan if impaired  - Assess patient's need for assistive devices and provide as appropriate  - Encourage maximum independence but intervene and supervise when necessary  - Involve family in performance of ADLs  - Assess for home care needs following discharge   - Consider OT consult to assist with ADL evaluation and planning for discharge  - Provide patient education as appropriate  Outcome: Progressing  Goal: Maintains/Returns to pre admission functional level  Description: INTERVENTIONS:  - Perform BMAT or MOVE assessment daily    - Set and communicate daily mobility goal to care team and patient/family/caregiver  - Collaborate with rehabilitation services on mobility goals if consulted  - Perform Range of Motion  times a day  - Reposition patient every  hours    - Dangle patient  times a day  - Stand patient times a day  - Ambulate patient  times a day  - Out of bed to chair  times a day   - Out of bed for meals  times a day  - Out of bed for toileting  - Record patient progress and toleration of activity level   Outcome: Progressing     Problem: Potential for Falls  Goal: Patient will remain free of falls  Description: INTERVENTIONS:  - Educate patient/family on patient safety including physical limitations  - Instruct patient to call for assistance with activity   - Consult OT/PT to assist with strengthening/mobility   - Keep Call bell within reach  - Keep bed low and locked with side rails adjusted as appropriate  - Keep care items and personal belongings within reach  - Initiate and maintain comfort rounds  - Make Fall Risk Sign visible to staff  - Offer Toileting every  Hours, in advance of need  - Initiate/Maintain alarm  - Obtain necessary fall risk management equipment:   - Apply yellow socks and bracelet for high fall risk patients  - Consider moving patient to room near nurses station  Outcome: Progressing     Problem: PAIN - ADULT  Goal: Verbalizes/displays adequate comfort level or baseline comfort level  Description: Interventions:  - Encourage patient to monitor pain and request assistance  - Assess pain using appropriate pain scale  - Administer analgesics based on type and severity of pain and evaluate response  - Implement non-pharmacological measures as appropriate and evaluate response  - Consider cultural and social influences on pain and pain management  - Notify physician/advanced practitioner if interventions unsuccessful or patient reports new pain  Outcome: Progressing     Problem: INFECTION - ADULT  Goal: Absence or prevention of progression during hospitalization  Description: INTERVENTIONS:  - Assess and monitor for signs and symptoms of infection  - Monitor lab/diagnostic results  - Monitor all insertion sites, i e  indwelling lines, tubes, and drains  - Monitor endotracheal if appropriate and nasal secretions for changes in amount and color  - [place] appropriate cooling/warming therapies per order  - Administer medications as ordered  - Instruct and encourage patient and family to use good hand hygiene technique  - Identify and instruct in appropriate isolation precautions for identified infection/condition  Outcome: Progressing  Goal: Absence of fever/infection during neutropenic period  Description: INTERVENTIONS:  - Monitor WBC    Outcome: Progressing     Problem: SAFETY ADULT  Goal: Maintain or return to baseline ADL function  Description: INTERVENTIONS:  -  Assess patient's ability to carry out ADLs; assess patient's baseline for ADL function and identify physical deficits which impact ability to perform ADLs (bathing, care of mouth/teeth, toileting, grooming, dressing, etc )  - Assess/evaluate cause of self-care deficits   - Assess range of motion  - Assess patient's mobility; develop plan if impaired  - Assess patient's need for assistive devices and provide as appropriate  - Encourage maximum independence but intervene and supervise when necessary  - Involve family in performance of ADLs  - Assess for home care needs following discharge   - Consider OT consult to assist with ADL evaluation and planning for discharge  - Provide patient education as appropriate  Outcome: Progressing  Goal: Maintains/Returns to pre admission functional level  Description: INTERVENTIONS:  - Perform BMAT or MOVE assessment daily    - Set and communicate daily mobility goal to care team and patient/family/caregiver  - Collaborate with rehabilitation services on mobility goals if consulted  - Perform Range of Motion  times a day  - Reposition patient every hours    - Dangle patient  times a day  - Stand patient  times a day  - Ambulate patient times a day  - Out of bed to chair tiimes a day   - Out of bed for meals times a day  - Out of bed for toileting  - Record patient progress and toleration of activity level   Outcome: Progressing  Goal: Patient will remain free of falls  Description: INTERVENTIONS:  - Educate patient/family on patient safety including physical limitations  - Instruct patient to call for assistance with activity   - Consult OT/PT to assist with strengthening/mobility   - Keep Call bell within reach  - Keep bed low and locked with side rails adjusted as appropriate  - Keep care items and personal belongings within reach  - Initiate and maintain comfort rounds  - Make Fall Risk Sign visible to staff  - Offer Toileting every  Hours, in advance of need  - Initiate/Maintain alarm  - Obtain necessary fall risk management equipment:   - Apply yellow socks and bracelet for high fall risk patients  - Consider moving patient to room near nurses station  Outcome: Progressing     Problem: GENITOURINARY - ADULT  Goal: Maintains or returns to baseline urinary function  Description: INTERVENTIONS:  - Assess urinary function  - Encourage oral fluids to ensure adequate hydration if ordered  - Administer IV fluids as ordered to ensure adequate hydration  - Administer ordered medications as needed  - Offer frequent toileting  - Follow urinary retention protocol if ordered  Outcome: Progressing  Goal: Absence of urinary retention  Description: INTERVENTIONS:  - Assess patient’s ability to void and empty bladder  - Monitor I/O  - Bladder scan as needed  - Discuss with physician/AP medications to alleviate retention as needed  - Discuss catheterization for long term situations as appropriate  Outcome: Progressing  Goal: Urinary catheter remains patent  Description: INTERVENTIONS:  - Assess patency of urinary catheter  - If patient has a chronic wagner, consider changing catheter if non-functioning  - Follow guidelines for intermittent irrigation of non-functioning urinary catheter  Outcome: Progressing     Problem: HEMATOLOGIC - ADULT  Goal: Maintains hematologic stability  Description: INTERVENTIONS  - Assess for signs and symptoms of bleeding or hemorrhage  - Monitor labs  - Administer supportive blood products/factors as ordered and appropriate  Outcome: Progressing     Problem: MUSCULOSKELETAL - ADULT  Goal: Maintain or return mobility to safest level of function  Description: INTERVENTIONS:  - Assess patient's ability to carry out ADLs; assess patient's baseline for ADL function and identify physical deficits which impact ability to perform ADLs (bathing, care of mouth/teeth, toileting, grooming, dressing, etc )  - Assess/evaluate cause of self-care deficits   - Assess range of motion  - Assess patient's mobility  - Assess patient's need for assistive devices and provide as appropriate  - Encourage maximum independence but intervene and supervise when necessary  - Involve family in performance of ADLs  - Assess for home care needs following discharge   - Consider OT consult to assist with ADL evaluation and planning for discharge  - Provide patient education as appropriate  Outcome: Progressing  Goal: Maintain proper alignment of affected body part  Description: INTERVENTIONS:  - Support, maintain and protect limb and body alignment  - Provide patient/ family with appropriate education  Outcome: Progressing

## 2023-01-12 NOTE — PROGRESS NOTES
[facility]  Progress Note - Pascal Spatz 1945, 76 y o  male MRN: [id number]  Unit/Bed#: E5 -01 Encounter: [id number]  Primary Care Provider: Alberto Meadows MD   Date and time admitted to hospital: 1/7/2023 10:17 PM    * Bacteremia due to Enterobacter species  Assessment & Plan  · Patient was transferred from [address] [place] [address] due to fever and positive blood cultures  · Blood culture growing Enterobacter  · Port-A-Cath removed by interventional radiology, follow-up culture of tip  · Repeat blood cultures no growth at 72 hours  · Spoke with ID  They are changing Cefepime to oral levaquin    Patient can be discharged once we have rehab placement    Gross hematuria  Assessment & Plan  · Patient has persistent hematuria due to bladder cancer  History of recurrent UTI on suppressive Bactrim  · CT A/P: Stable bladder wall thickening, compatible with known malignancy  Increased hyperdensity within the bladder lumen, compatible with new blood clot  · Recent admission at [facility] 12/12-12/22 for hematuria and obstructive uropathy in the setting of known bladder cancer s/p BCG and subsequent chemotherapy/radiation s/p bilateral chronic PCN tubes  Noted to have new right-sided hydronephrosis on CT s/p cystoscopy, clot evacuation w/ right ureteral stenting on 12/14 by Dr Rodolfo Brown  · UA bloody, innumerable RBC/WBC, +leuks, no nitrites  · Patient did need CBI during the hospitalization but that has been discontinued  · He is now on hand irrigation  · I spoke with urology and he is ok to discharge with continued hand irrigation    Influenza A  Assessment & Plan  · Patient tested + for influenza A prior to discharge to [facility] on [address]  · Asymptomatic therefore not treated   · Negative flu on Jan 7    Malignant neoplasm of anterior wall of urinary bladder ([facility] [address] )  Assessment & Plan  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, recurrence with CIS in 2016 BCG again   BCG refractory disease with pelvic metastases now s/p chemoradiation , on current immunotherapy  · Chronic B/L PCN tubes  · CT A/P : Stable bladder wall thickening, compatible with known malignancy  Stable metastatic retroperitoneal lymphadenopathy  Stable epicardial metastasis  · Per oncology note on : Chemotherapy treatment should remain on hold for now untill he is clinically stable/discharged; particularly since he is positive flu  To follow-up with primary oncologist after discharge for reevaluation prior to resume treatment  · Port removed with bacteremia    Type 2 diabetes mellitus, with long-term current use of insulin ([facility] [address] )  Assessment & Plan  Lab Results   Component Value Date    HGBA1C 7 2 (H) 10/20/2022     · Currently receiving Lantus 15 units with sliding scale TID  · ADA diet  · Family reports allergy to humalog, so that is not being used    Recent Labs     23  1601 23  2048 23  0738 23  1130   POCGLU 164* 371* 166* 150*       Blood Sugar Average: Last 72 hrs:  (P) 165            Subjective:   Patient has no specific complaints  No abdominal pain  No fever  He did refuse PT today, but says he will do it tomorrow      Objective:     Vitals:   Temp (24hrs), Av 3 °F (36 8 °C), Min:97 7 °F (36 5 °C), Max:98 9 °F (37 2 °C)    Temp:  [97 7 °F (36 5 °C)-98 9 °F (37 2 °C)] 98 9 °F (37 2 °C)  HR:  [75-87] 82  Resp:  [16-20] 16  BP: (110-152)/(64-88) 110/64  SpO2:  [97 %-99 %] 97 %  Body mass index is 23 53 kg/m²  Input and Output Summary (last 24 hours): Intake/Output Summary (Last 24 hours) at 2023 1603  Last data filed at 2023 0525  Gross per 24 hour   Intake --   Output 2650 ml   Net -2650 ml       Physical Exam:     Physical Exam  Vitals and nursing note reviewed  HENT:      Head: Normocephalic and atraumatic  Eyes:      Pupils: Pupils are equal, round, and reactive to light     Cardiovascular:      Rate and Rhythm: Normal rate and regular rhythm  Heart sounds: No murmur heard  No friction rub  No gallop  Pulmonary:      Effort: Pulmonary effort is normal       Breath sounds: Normal breath sounds  No wheezing or rales  Abdominal:      General: Bowel sounds are normal       Palpations: Abdomen is soft  Tenderness: There is no abdominal tenderness  Genitourinary:     Comments: Has hawaiian punch hemturia in catheter  Musculoskeletal:      Right lower leg: No edema  Left lower leg: No edema          Additional Data:     Labs:    Results from last 7 days   Lab Units 01/12/23  0541   WBC Thousand/uL 12 77*   HEMOGLOBIN g/dL 8 0*   HEMATOCRIT % 25 7*   PLATELETS Thousands/uL 241   NEUTROS PCT % 68   LYMPHS PCT % 18   MONOS PCT % 6   EOS PCT % 6     Results from last 7 days   Lab Units 01/12/23  0541   POTASSIUM mmol/L 4 7   CHLORIDE mmol/L 105   CO2 mmol/L 26   BUN mg/dL 55*   CREATININE mg/dL 3 12*   CALCIUM mg/dL 9 5   ALK PHOS U/L 133*   ALT U/L 23   AST U/L 28     Results from last 7 days   Lab Units 01/07/23  1613   INR  0 90     Results from last 7 days   Lab Units 01/12/23  1130 01/12/23  0738 01/11/23  2048 01/11/23  1601 01/11/23  0702 01/10/23  2136 01/10/23  1606 01/10/23  1103 01/10/23  0927 01/09/23  2120 01/09/23  1556 01/09/23  1124   POC GLUCOSE mg/dl 150* 166* 371* 164* 81 198* 166* 111 161* 126 166* 207*               * I Have Reviewed All Lab Data     Recent Cultures (last 7 days):     Results from last 7 days   Lab Units 01/07/23  1653 01/07/23  1613 01/07/23  1611 01/06/23  2030 01/06/23  1530   BLOOD CULTURE   --  No Growth After 4 Days  No Growth After 4 Days   Enterobacter cloacae*  --    GRAM STAIN RESULT   --   --   --  Gram negative rods*  --    URINE CULTURE  >100,000 cfu/ml Escherichia coli ESBL*  80,000-89,000 cfu/ml Enterococcus faecalis*  --   --   --  >100,000 cfu/ml - Presumptive Stenotrophomonas maltophilia*  50,000-59,000 cfu/ml Candida albicans*  >100,000 cfu/ml Pseudomonas putida group* >100,000 cfu/ml Enterococcus faecalis*         Last 24 Hours Medication List:   Current Facility-Administered Medications   Medication Dose Route Frequency Provider Last Rate   • acetaminophen  975 mg Oral [facility] Hershal Carrel, PA-C     • ALPRAZolam  0 25 mg Oral BID PRN Edra Men, CRNP     • ARIPiprazole  2 mg Oral Daily Edra Men, CRNP     • azelastine  1 spray Each Nare BID PRN Edra Men, CRNP     • bimatoprost  1 drop Both Eyes HS Edra Men, CRNP     • calcitriol  0 25 mcg Oral Daily Edra Men, CRNP     • cyanocobalamin  1,000 mcg Oral Daily Edra Men, CRNP     • ezetimibe  10 mg Oral Daily Edra Men, CRNP     • furosemide  20 mg Oral Daily Edra Men, CRNP     • gabapentin  300 mg Oral HS Edra Men, CRNP     • HYDROmorphone  0 2 mg Intravenous 4x Daily PRN Edra Men, CRNP     • insulin glargine  15 Units Subcutaneous HS Lorna Jenn Johnson PA-C     • levofloxacin  750 mg Oral Every Other Day Mariya Calderón MD     • metoprolol tartrate  25 mg Oral Q12H [address] [facility], CRNP     • multivitamin stress formula  1 tablet Oral Daily Edra Men, CRNP     • ondansetron  4 mg Intravenous Q6H PRN Edra Men, CRNP     • oxybutynin  5 mg Oral TID Rhodelia Ramus, CRNP     • oxyCODONE  10 mg Oral TID PRN Edra Men, CRNP     • pantoprazole  20 mg Oral Early Morning Edra Men, CRNP     • polyethylene glycol  17 g Oral Daily PRN Edra Men, CRNP     • senna  2 tablet Oral BID PRN Leeanne Hashimoto Prechtel, DO     • simethicone  80 mg Oral 4x Daily PRN Edra Men, CRNP           VTE Pharmacologic Prophylaxis:   Pharmacologic: non with hematuria      Current Length of Stay: 5 day(s)    Current Patient Status: Inpatient       Discharge Plan: Looking for SNF    Code Status: Level 1 - Full Code           Today, Patient Was Seen By: Kennedy Millan DO    ** Please Note: Dictation voice to text software may have been used in the creation of this document   **

## 2023-01-12 NOTE — ASSESSMENT & PLAN NOTE
· Patient has persistent hematuria due to bladder cancer  History of recurrent UTI on suppressive Bactrim  · CT A/P: Stable bladder wall thickening, compatible with known malignancy  Increased hyperdensity within the bladder lumen, compatible with new blood clot  · Recent admission at [facility] 12/12-12/22 for hematuria and obstructive uropathy in the setting of known bladder cancer s/p BCG and subsequent chemotherapy/radiation s/p bilateral chronic PCN tubes  Noted to have new right-sided hydronephrosis on CT s/p cystoscopy, clot evacuation w/ right ureteral stenting on 12/14 by Dr Amber Villeda  · UA bloody, innumerable RBC/WBC, +leuks, no nitrites  · Patient did need CBI during the hospitalization but that has been discontinued  · He is now on hand irrigation    · I spoke with urology and he is ok to discharge with continued hand irrigation

## 2023-01-12 NOTE — ASSESSMENT & PLAN NOTE
· Patient tested + for influenza A prior to discharge to [facility] on [address]  · Asymptomatic therefore not treated   · Negative flu on Jan 7

## 2023-01-12 NOTE — TELEPHONE ENCOUNTER
Discussed with Dr Delmy Coelho regarding patient's persistent hematuria in the setting of indwelling right ureteral stent with bilateral PCNs  He recommends office eval with Dr Alpa Cruz in Feb to determine plan for stent (initial recs were stent x 3 months but complicated my recurrent admissions for gross hematuria)  Possible office cysto stent removal at that time if Dr Alpa Cruz opts to remove patient's stent  Would review with Dr Alpa Cruz if he would like any imaging or lab work prior to this visit  In the interim, patient would benefit from prn irrigation at home  Can we help arrange supplies/orders for this? Let me know if you have any questions!

## 2023-01-13 LAB
ANION GAP SERPL CALCULATED.3IONS-SCNC: 6 MMOL/L (ref 4–13)
BACTERIA BLD CULT: NORMAL
BACTERIA BLD CULT: NORMAL
BACTERIA CATH TIP CULT: NO GROWTH
BASOPHILS # BLD AUTO: 0.05 THOUSANDS/ÂΜL (ref 0–0.1)
BASOPHILS NFR BLD AUTO: 0 % (ref 0–1)
BUN SERPL-MCNC: 49 MG/DL (ref 5–25)
CALCIUM SERPL-MCNC: 9 MG/DL (ref 8.3–10.1)
CHLORIDE SERPL-SCNC: 102 MMOL/L (ref 96–108)
CO2 SERPL-SCNC: 27 MMOL/L (ref 21–32)
CREAT SERPL-MCNC: 3.14 MG/DL (ref 0.6–1.3)
EOSINOPHIL # BLD AUTO: 0.79 THOUSAND/ÂΜL (ref 0–0.61)
EOSINOPHIL NFR BLD AUTO: 7 % (ref 0–6)
ERYTHROCYTE [DISTWIDTH] IN BLOOD BY AUTOMATED COUNT: 15.7 % (ref 11.6–15.1)
GFR SERPL CREATININE-BSD FRML MDRD: 17 ML/MIN/1.73SQ M
GLUCOSE SERPL-MCNC: 196 MG/DL (ref 65–140)
GLUCOSE SERPL-MCNC: 199 MG/DL (ref 65–140)
GLUCOSE SERPL-MCNC: 223 MG/DL (ref 65–140)
GLUCOSE SERPL-MCNC: 224 MG/DL (ref 65–140)
GLUCOSE SERPL-MCNC: 262 MG/DL (ref 65–140)
HCT VFR BLD AUTO: 25.5 % (ref 36.5–49.3)
HGB BLD-MCNC: 7.9 G/DL (ref 12–17)
IMM GRANULOCYTES # BLD AUTO: 0.09 THOUSAND/UL (ref 0–0.2)
IMM GRANULOCYTES NFR BLD AUTO: 1 % (ref 0–2)
LYMPHOCYTES # BLD AUTO: 2.02 THOUSANDS/ÂΜL (ref 0.6–4.47)
LYMPHOCYTES NFR BLD AUTO: 17 % (ref 14–44)
MCH RBC QN AUTO: 29.4 PG (ref 26.8–34.3)
MCHC RBC AUTO-ENTMCNC: 31 G/DL (ref 31.4–37.4)
MCV RBC AUTO: 95 FL (ref 82–98)
MONOCYTES # BLD AUTO: 0.76 THOUSAND/ÂΜL (ref 0.17–1.22)
MONOCYTES NFR BLD AUTO: 7 % (ref 4–12)
NEUTROPHILS # BLD AUTO: 8.02 THOUSANDS/ÂΜL (ref 1.85–7.62)
NEUTS SEG NFR BLD AUTO: 68 % (ref 43–75)
NRBC BLD AUTO-RTO: 0 /100 WBCS
PLATELET # BLD AUTO: 224 THOUSANDS/UL (ref 149–390)
PMV BLD AUTO: 9.2 FL (ref 8.9–12.7)
POTASSIUM SERPL-SCNC: 4.5 MMOL/L (ref 3.5–5.3)
RBC # BLD AUTO: 2.69 MILLION/UL (ref 3.88–5.62)
SODIUM SERPL-SCNC: 135 MMOL/L (ref 135–147)
WBC # BLD AUTO: 11.73 THOUSAND/UL (ref 4.31–10.16)

## 2023-01-13 RX ADMIN — INSULIN GLARGINE 15 UNITS: 100 INJECTION, SOLUTION SUBCUTANEOUS at 21:23

## 2023-01-13 RX ADMIN — OXYCODONE HYDROCHLORIDE 10 MG: 10 TABLET ORAL at 19:46

## 2023-01-13 RX ADMIN — OXYCODONE HYDROCHLORIDE 10 MG: 10 TABLET ORAL at 06:22

## 2023-01-13 RX ADMIN — HYDROMORPHONE HYDROCHLORIDE 0.2 MG: 0.2 INJECTION, SOLUTION INTRAMUSCULAR; INTRAVENOUS; SUBCUTANEOUS at 08:34

## 2023-01-13 RX ADMIN — EZETIMIBE 10 MG: 10 TABLET ORAL at 08:29

## 2023-01-13 RX ADMIN — ACETAMINOPHEN 975 MG: 325 TABLET, FILM COATED ORAL at 06:22

## 2023-01-13 RX ADMIN — OXYBUTYNIN CHLORIDE 5 MG: 5 TABLET ORAL at 20:07

## 2023-01-13 RX ADMIN — CALCITRIOL 0.25 MCG: 0.25 CAPSULE, LIQUID FILLED ORAL at 08:29

## 2023-01-13 RX ADMIN — METOPROLOL TARTRATE 25 MG: 25 TABLET, FILM COATED ORAL at 20:07

## 2023-01-13 RX ADMIN — B-COMPLEX W/ C & FOLIC ACID TAB 1 TABLET: TAB at 08:29

## 2023-01-13 RX ADMIN — CYANOCOBALAMIN TAB 500 MCG 1000 MCG: 500 TAB at 08:29

## 2023-01-13 RX ADMIN — FUROSEMIDE 20 MG: 20 TABLET ORAL at 08:29

## 2023-01-13 RX ADMIN — ARIPIPRAZOLE 2 MG: 2 TABLET ORAL at 08:29

## 2023-01-13 RX ADMIN — HYDROMORPHONE HYDROCHLORIDE 0.2 MG: 0.2 INJECTION, SOLUTION INTRAMUSCULAR; INTRAVENOUS; SUBCUTANEOUS at 23:54

## 2023-01-13 RX ADMIN — METOPROLOL TARTRATE 25 MG: 25 TABLET, FILM COATED ORAL at 08:29

## 2023-01-13 RX ADMIN — ACETAMINOPHEN 975 MG: 325 TABLET, FILM COATED ORAL at 21:23

## 2023-01-13 RX ADMIN — PANTOPRAZOLE SODIUM 20 MG: 20 TABLET, DELAYED RELEASE ORAL at 06:22

## 2023-01-13 RX ADMIN — OXYBUTYNIN CHLORIDE 5 MG: 5 TABLET ORAL at 16:59

## 2023-01-13 RX ADMIN — OXYCODONE HYDROCHLORIDE 10 MG: 10 TABLET ORAL at 14:28

## 2023-01-13 RX ADMIN — BIMATOPROST 1 DROP: 0.1 SOLUTION/ DROPS OPHTHALMIC at 21:23

## 2023-01-13 RX ADMIN — ACETAMINOPHEN 975 MG: 325 TABLET, FILM COATED ORAL at 14:28

## 2023-01-13 RX ADMIN — OXYBUTYNIN CHLORIDE 5 MG: 5 TABLET ORAL at 08:29

## 2023-01-13 RX ADMIN — GABAPENTIN 300 MG: 300 CAPSULE ORAL at 21:23

## 2023-01-13 NOTE — PLAN OF CARE
Problem: OCCUPATIONAL THERAPY ADULT  Goal: Performs self-care activities at highest level of function for planned discharge setting  See evaluation for individualized goals  Description: Treatment Interventions: ADL retraining, Functional transfer training, UE strengthening/ROM, Endurance training, Patient/family training, Equipment evaluation/education, Compensatory technique education, Continued evaluation, Activityengagement          See flowsheet documentation for full assessment, interventions and recommendations  Note: Limitation: Decreased ADL status, Decreased UE strength, Decreased Safe judgement during ADL, Decreased endurance, Decreased self-care trans, Decreased high-level ADLs, Decreased fine motor control  Prognosis: Good  Assessment: Pt seen for 25min tx session with focus on functional balance, functional mobility, ADL status, transfers safety, and cognition  Pt able to tolerate OOB mobility, sitting balance=f+/f, standing balance=f/f-  Pt demonstrating need for assistance with his UE and LE ADLs  Pt required verbal/physical assistance to maintain his transfer safety  Able to state good cognition(i e orientation, memory), but questionable judgement/safety noted  Pt continues to demonstrate appropriateness for inpt rehab to improve his overall level of independence  Will continue       OT Discharge Recommendation: Post acute rehabilitation services

## 2023-01-13 NOTE — PROGRESS NOTES
[facility]  Progress Note - Mike Alva 1945, 76 y o  male MRN: [id number]  Unit/Bed#: E5 -01 Encounter: [id number]  Primary Care Provider: Cali Barajas MD   Date and time admitted to hospital: 1/7/2023 10:17 PM    * Bacteremia due to Enterobacter species  Assessment & Plan  · Patient was transferred from [address] [place] [address] due to fever and positive blood cultures  · Blood culture growing Enterobacter  · Port-A-Cath removed by interventional radiology, follow-up culture of tip  · Repeat blood cultures no growth at 72 hours  · Spoke with ID  They are changing Cefepime to oral levaquin    Patient can be discharged once we have rehab placement    Acute on chronic blood loss anemia  Assessment & Plan  · History of hematuria due to bladder cancer  · Hg 7 1 prior to transfer  Received 1 unit PRBC  · Iron panel showing continued deficiency-unable to utilize IV iron given bacteremia  · Seen in consult by hematology oncology    Hemoglobin 8 2 > 8 1 > 8 0 > 7 9    Will see where we are tomorrow  If still trending down, will transfuse    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)  Assessment & Plan  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, recurrence with CIS in 2016 BCG again  BCG refractory disease with pelvic metastases now s/p chemoradiation 2021, on current immunotherapy  · Chronic B/L PCN tubes  · CT A/P Jan 7: Stable bladder wall thickening, compatible with known malignancy  Stable metastatic retroperitoneal lymphadenopathy  Stable epicardial metastasis  · Per oncology note on Jan 6: Chemotherapy treatment should remain on hold for now untill he is clinically stable/discharged; particularly since he is positive flu  To follow-up with primary oncologist after discharge for reevaluation prior to resume treatment    · Port removed with bacteremia    Type 2 diabetes mellitus, with long-term current use of insulin (HCC)  Assessment & Plan  Lab Results   Component Value Date    HGBA1C 7 2 (H) 10/20/2022     · Currently receiving Lantus 15 units with sliding scale TID  · ADA diet  · Family reports allergy to humalog, so that is not being used    Recent Labs     23  2101 23  0742 23  1132 23  1614   POCGLU 191* 196* 199* 223*       Blood Sugar Average: Last 72 hrs:  (P) 181 [id number]            Subjective:   Feels well  No complaints other than chronic bladder pain  Still having blood in wagner, about the saem      Objective:     Vitals:   Temp (24hrs), Av 2 °F (37 3 °C), Min:98 6 °F (37 °C), Max:99 7 °F (37 6 °C)    Temp:  [98 6 °F (37 °C)-99 7 °F (37 6 °C)] 99 7 °F (37 6 °C)  HR:  [] 100  Resp:  [18] 18  BP: (109-135)/(65-74) 109/69  SpO2:  [97 %-98 %] 98 %  Body mass index is 23 59 kg/m²  Input and Output Summary (last 24 hours): Intake/Output Summary (Last 24 hours) at 2023 1805  Last data filed at 2023 1115  Gross per 24 hour   Intake --   Output 2135 ml   Net -2135 ml       Physical Exam:     Physical Exam  Vitals and nursing note reviewed  HENT:      Head: Normocephalic and atraumatic  Eyes:      Pupils: Pupils are equal, round, and reactive to light  Cardiovascular:      Rate and Rhythm: Normal rate and regular rhythm  Heart sounds: No murmur heard  No friction rub  No gallop  Pulmonary:      Effort: Pulmonary effort is normal       Breath sounds: Normal breath sounds  No wheezing or rales  Abdominal:      General: Bowel sounds are normal       Palpations: Abdomen is soft  Tenderness: There is no abdominal tenderness  Musculoskeletal:      Right lower leg: No edema  Left lower leg: No edema                 Additional Data:     Labs:    Results from last 7 days   Lab Units 23  0531   WBC Thousand/uL 11 73*   HEMOGLOBIN g/dL 7 9*   HEMATOCRIT % 25 5*   PLATELETS Thousands/uL 224   NEUTROS PCT % 68   LYMPHS PCT % 17   MONOS PCT % 7   EOS PCT % 7* Results from last 7 days   Lab Units 01/13/23  0531 01/12/23  0541   POTASSIUM mmol/L 4 5 4 7   CHLORIDE mmol/L 102 105   CO2 mmol/L 27 26   BUN mg/dL 49* 55*   CREATININE mg/dL 3 14* 3 12*   CALCIUM mg/dL 9 0 9 5   ALK PHOS U/L  --  133*   ALT U/L  --  23   AST U/L  --  28     Results from last 7 days   Lab Units 01/07/23  1613   INR  0 90     Results from last 7 days   Lab Units 01/13/23  1614 01/13/23  1132 01/13/23  0742 01/12/23  2101 01/12/23  1608 01/12/23  1130 01/12/23  0738 01/11/23  2048 01/11/23  1601 01/11/23  0702 01/10/23  2136 01/10/23  1606   POC GLUCOSE mg/dl 223* 199* 196* 191* 165* 150* 166* 371* 164* 81 198* 166*               * I Have Reviewed All Lab Data     Recent Cultures (last 7 days):     Results from last 7 days   Lab Units 01/07/23  1653 01/07/23  1613 01/07/23  1611 01/06/23  2030   BLOOD CULTURE   --  No Growth After 5 Days  No Growth After 5 Days   Enterobacter cloacae*   GRAM STAIN RESULT   --   --   --  Gram negative rods*   URINE CULTURE  >100,000 cfu/ml Escherichia coli ESBL*  80,000-89,000 cfu/ml Enterococcus faecalis*  --   --   --          Last 24 Hours Medication List:   Current Facility-Administered Medications   Medication Dose Route Frequency Provider Last Rate   • acetaminophen  975 mg Oral [facility] Pilar Seals PA-C     • ALPRAZolam  0 25 mg Oral BID PRN EVANGELINA Mercado     • ARIPiprazole  2 mg Oral Daily EVANGELINA Mercado     • azelastine  1 spray Each Nare BID PRN EVANGELINA Mercado     • bimatoprost  1 drop Both Eyes HS EVANGELINA Mercado     • calcitriol  0 25 mcg Oral Daily EVANGELINA Mercado     • cyanocobalamin  1,000 mcg Oral Daily EVANGELINA Mercado     • ezetimibe  10 mg Oral Daily EVANGELINA Meracdo     • furosemide  20 mg Oral Daily EVANGELINA Mercado     • gabapentin  300 mg Oral HS EVANGELINA Mercado     • HYDROmorphone  0 2 mg Intravenous 4x Daily PRN EVANGELINA Mercado     • insulin glargine  15 Units Subcutaneous HS Lorna Jc Diaz PA-C     • levofloxacin  750 mg Oral Every Other Day Eloina Parry MD     • metoprolol tartrate  25 mg [facility]12H [facility]     • multivitamin stress formula  1 tablet Oral Daily EVANGELINA Vallejo     • ondansetron  4 mg Intravenous Q6H PRN EVANGELINA Vallejo     • oxybutynin  5 mg Oral TID EVANGELINA Gloria     • oxyCODONE  10 mg Oral TID PRN EVANGELINA Vallejo     • pantoprazole  20 mg Oral Early Morning EVANGELINA Vallejo     • polyethylene glycol  17 g Oral Daily PRN EVANGELINA Vallejo     • senna  2 tablet Oral BID PRN Brady Mcclelland DO     • simethicone  80 mg Oral 4x Daily PRN EVANGELINA Vallejo           VTE Pharmacologic Prophylaxis:   Pharmacologic: none with hematuria      Current Length of Stay: 6 day(s)    Current Patient Status: Inpatient       Discharge Plan: waiting for rehab approval    Code Status: Level 1 - Full Code           Today, Patient Was Seen By: Kate Sol DO    ** Please Note: Dictation voice to text software may have been used in the creation of this document   **

## 2023-01-13 NOTE — PLAN OF CARE
Problem: Nutrition/Hydration-ADULT  Goal: Nutrient/Hydration intake appropriate for improving, restoring or maintaining nutritional needs  Description: Monitor and assess patient's nutrition/hydration status for malnutrition  Collaborate with interdisciplinary team and initiate plan and interventions as ordered  Monitor patient's weight and dietary intake as ordered or per policy  Utilize nutrition screening tool and intervene as necessary  Determine patient's food preferences and provide high-protein, high-caloric foods as appropriate       INTERVENTIONS:  - Monitor oral intake, urinary output, labs, and treatment plans  - Assess nutrition and hydration status and recommend course of action  - Evaluate amount of meals eaten  - Assist patient with eating if necessary   - Allow adequate time for meals  - Recommend/ encourage appropriate diets, oral nutritional supplements, and vitamin/mineral supplements  - Order, calculate, and assess calorie counts as needed  - Recommend, monitor, and adjust tube feedings and TPN/PPN based on assessed needs  - Assess need for intravenous fluids  - Provide specific nutrition/hydration education as appropriate  - Include patient/family/caregiver in decisions related to nutrition  Outcome: Progressing     Problem: Prexisting or High Potential for Compromised Skin Integrity  Goal: Skin integrity is maintained or improved  Description: INTERVENTIONS:  - Identify patients at risk for skin breakdown  - Assess and monitor skin integrity  - Assess and monitor nutrition and hydration status  - Monitor labs   - Assess for incontinence   - Turn and reposition patient  - Assist with mobility/ambulation  - Relieve pressure over bony prominences  - Avoid friction and shearing  - Provide appropriate hygiene as needed including keeping skin clean and dry  - Evaluate need for skin moisturizer/barrier cream  - Collaborate with interdisciplinary team   - Patient/family teaching  - Consider wound care consult   Outcome: Progressing     Problem: MOBILITY - ADULT  Goal: Maintain or return to baseline ADL function  Description: INTERVENTIONS:  -  Assess patient's ability to carry out ADLs; assess patient's baseline for ADL function and identify physical deficits which impact ability to perform ADLs (bathing, care of mouth/teeth, toileting, grooming, dressing, etc )  - Assess/evaluate cause of self-care deficits   - Assess range of motion  - Assess patient's mobility; develop plan if impaired  - Assess patient's need for assistive devices and provide as appropriate  - Encourage maximum independence but intervene and supervise when necessary  - Involve family in performance of ADLs  - Assess for home care needs following discharge   - Consider OT consult to assist with ADL evaluation and planning for discharge  - Provide patient education as appropriate  Outcome: Progressing  Goal: Maintains/Returns to pre admission functional level  Description: INTERVENTIONS:  - Perform BMAT or MOVE assessment daily    - Set and communicate daily mobility goal to care team and patient/family/caregiver     - Collaborate with rehabilitation services on mobility goals if consulted  - Out of bed for toileting  - Record patient progress and toleration of activity level   Outcome: Progressing     Problem: Potential for Falls  Goal: Patient will remain free of falls  Description: INTERVENTIONS:  - Educate patient/family on patient safety including physical limitations  - Instruct patient to call for assistance with activity   - Consult OT/PT to assist with strengthening/mobility   - Keep Call bell within reach  - Keep bed low and locked with side rails adjusted as appropriate  - Keep care items and personal belongings within reach  - Initiate and maintain comfort rounds  - Make Fall Risk Sign visible to staff  - Apply yellow socks and bracelet for high fall risk patients  - Consider moving patient to room near nurses station  Outcome: Progressing     Problem: PAIN - ADULT  Goal: Verbalizes/displays adequate comfort level or baseline comfort level  Description: Interventions:  - Encourage patient to monitor pain and request assistance  - Assess pain using appropriate pain scale  - Administer analgesics based on type and severity of pain and evaluate response  - Implement non-pharmacological measures as appropriate and evaluate response  - Consider cultural and social influences on pain and pain management  - Notify physician/advanced practitioner if interventions unsuccessful or patient reports new pain  Outcome: Progressing     Problem: INFECTION - ADULT  Goal: Absence or prevention of progression during hospitalization  Description: INTERVENTIONS:  - Assess and monitor for signs and symptoms of infection  - Monitor lab/diagnostic results  - Monitor all insertion sites, i e  indwelling lines, tubes, and drains  - Monitor endotracheal if appropriate and nasal secretions for changes in amount and color  - [place] appropriate cooling/warming therapies per order  - Administer medications as ordered  - Instruct and encourage patient and family to use good hand hygiene technique  - Identify and instruct in appropriate isolation precautions for identified infection/condition  Outcome: Progressing  Goal: Absence of fever/infection during neutropenic period  Description: INTERVENTIONS:  - Monitor WBC    Outcome: Progressing     Problem: SAFETY ADULT  Goal: Maintain or return to baseline ADL function  Description: INTERVENTIONS:  -  Assess patient's ability to carry out ADLs; assess patient's baseline for ADL function and identify physical deficits which impact ability to perform ADLs (bathing, care of mouth/teeth, toileting, grooming, dressing, etc )  - Assess/evaluate cause of self-care deficits   - Assess range of motion  - Assess patient's mobility; develop plan if impaired  - Assess patient's need for assistive devices and provide as appropriate  - Encourage maximum independence but intervene and supervise when necessary  - Involve family in performance of ADLs  - Assess for home care needs following discharge   - Consider OT consult to assist with ADL evaluation and planning for discharge  - Provide patient education as appropriate  Outcome: Progressing  Goal: Maintains/Returns to pre admission functional level  Description: INTERVENTIONS:  - Perform BMAT or MOVE assessment daily    - Set and communicate daily mobility goal to care team and patient/family/caregiver     - Collaborate with rehabilitation services on mobility goals if consulted  - Out of bed for toileting  - Record patient progress and toleration of activity level   Outcome: Progressing  Goal: Patient will remain free of falls  Description: INTERVENTIONS:  - Educate patient/family on patient safety including physical limitations  - Instruct patient to call for assistance with activity   - Consult OT/PT to assist with strengthening/mobility   - Keep Call bell within reach  - Keep bed low and locked with side rails adjusted as appropriate  - Keep care items and personal belongings within reach  - Initiate and maintain comfort rounds  - Make Fall Risk Sign visible to staff  - Apply yellow socks and bracelet for high fall risk patients  - Consider moving patient to room near nurses station  Outcome: Progressing     Problem: GENITOURINARY - ADULT  Goal: Maintains or returns to baseline urinary function  Description: INTERVENTIONS:  - Assess urinary function  - Encourage oral fluids to ensure adequate hydration if ordered  - Administer IV fluids as ordered to ensure adequate hydration  - Administer ordered medications as needed  - Offer frequent toileting  - Follow urinary retention protocol if ordered  Outcome: Progressing  Goal: Absence of urinary retention  Description: INTERVENTIONS:  - Assess patient’s ability to void and empty bladder  - Monitor I/O  - Bladder scan as needed  - Discuss with physician/AP medications to alleviate retention as needed  - Discuss catheterization for long term situations as appropriate  Outcome: Progressing  Goal: Urinary catheter remains patent  Description: INTERVENTIONS:  - Assess patency of urinary catheter  - If patient has a chronic wagner, consider changing catheter if non-functioning  - Follow guidelines for intermittent irrigation of non-functioning urinary catheter  Outcome: Progressing     Problem: HEMATOLOGIC - ADULT  Goal: Maintains hematologic stability  Description: INTERVENTIONS  - Assess for signs and symptoms of bleeding or hemorrhage  - Monitor labs  - Administer supportive blood products/factors as ordered and appropriate  Outcome: Progressing     Problem: MUSCULOSKELETAL - ADULT  Goal: Maintain or return mobility to safest level of function  Description: INTERVENTIONS:  - Assess patient's ability to carry out ADLs; assess patient's baseline for ADL function and identify physical deficits which impact ability to perform ADLs (bathing, care of mouth/teeth, toileting, grooming, dressing, etc )  - Assess/evaluate cause of self-care deficits   - Assess range of motion  - Assess patient's mobility  - Assess patient's need for assistive devices and provide as appropriate  - Encourage maximum independence but intervene and supervise when necessary  - Involve family in performance of ADLs  - Assess for home care needs following discharge   - Consider OT consult to assist with ADL evaluation and planning for discharge  - Provide patient education as appropriate  Outcome: Progressing  Goal: Maintain proper alignment of affected body part  Description: INTERVENTIONS:  - Support, maintain and protect limb and body alignment  - Provide patient/ family with appropriate education  Outcome: Progressing

## 2023-01-13 NOTE — PHYSICAL THERAPY NOTE
PHYSICAL THERAPY NOTE          Patient Name: Mike Alva  [id number] Date: 1/13/2023  Time: 6479-1985 01/13/23 1003   PT Last Visit   PT Visit Date 01/13/23   Note Type   Note Type Treatment for insurance authorization   Pain Assessment   Pain Assessment Tool 0-10   Pain Score 8   Pain Location/Orientation Location: [facility] Pain Intervention(s) Repositioned; Ambulation/increased activity; Emotional support; Rest  (medicated prior to session)   Restrictions/Precautions   Other Precautions Contact/isolation;Cognitive; Chair Alarm; Bed Alarm;Multiple lines; Fall Risk;Pain   General   Chart Reviewed Yes   Response to Previous Treatment Patient with no complaints from previous session  Cognition   Overall Cognitive Status Impaired   Arousal/Participation Cooperative   Attention Attends with cues to redirect   Orientation Level Oriented X4   Memory Decreased recall of recent events   Following Commands Follows one step commands with increased time or repetition   Comments may benefit from formal cognitive testing, seems forgetful  labile moods   Bed Mobility   Supine to Sit 3  Moderate assistance   Additional items Assist x 1;Assist x 2;HOB elevated; Bedrails; Increased time required;Verbal cues; Other  (repositioning at hips, trunk support)   Transfers   Sit to Stand 4  Minimal assistance   Additional items Assist x 1; Increased time required;Verbal cues; Other  (bed height elevated, RW)   Stand to Sit 4  Minimal assistance   Additional items Assist x 1; Armrests; Increased time required;Verbal cues; Other  (RW)   Ambulation/Elevation   Gait pattern Improper Weight shift; Forward Flexion; Short stride; Excessively slow; Inconsistent eliecer   Gait Assistance 4  Minimal assist   Additional items Assist x 1;Verbal cues   Assistive Device Rolling walker   Distance 85'   Balance   Static Sitting Fair   Dynamic Sitting Fair -   Static Standing Fair -   Dynamic Standing Poor +   Ambulatory Poor +   Endurance Deficit   Endurance Deficit Yes   Endurance Deficit Description easily fatigues  slower gait speeds w time/fatigue   Activity Tolerance   Activity Tolerance Patient limited by fatigue;Patient limited by pain   Medical Staff [address] OT; CM   Nurse Made Aware Jesse Schwartz RN   Assessment   Prognosis Fair   Problem List Decreased strength;Decreased endurance; Impaired balance;Decreased mobility; Decreased cognition; Impaired judgement;Decreased safety awareness;Decreased skin integrity;Pain   Assessment Fito Stroud continues to report severe pain in groin; pain especially w bed mobility otherwise in no apparent distress throughout session  Demonstrates progress towards goals today including improved am-pac score, decreased assist for transfers and ambulation, increased walking distances  Continues to rely on bed height elevated to transfer  Noted gait deviations as well as limited endurance contributing to fall risk  Currently ambulating household distances w increased time to complete/ slower gait speeds  Would continue to benefit from therapy services to facilitate recovery and optimize mobility to reduce caregiver burden  Goals   Patient Goals reduce pain, go home   STG Expiration Date 01/23/23   Short Term Goal #1 1)  Pt will perform bed mobility with S demonstrating appropriate technique 100% of the time in order to improve function  2)  Perform all transfers with S demonstrating safe and appropriate technique 100% of the time in order to improve ability to negotiate safely in home environment  3) Amb with least restrictive AD > 50'x1 with S in order to demonstrate ability to negotiate in home environment  4)  Improve overall strength and balance 1/2 grade in order to optimize ability to perform functional tasks and reduce fall risk  5) Increase activity tolerance to 45 minutes in order to improve endurance to functional tasks  6)  Negotiate stairs using most appropriate technique and min A in order to be able to negotiate safely in home environment  7) PT for ongoing patient and family/caregiver education, DME needs and d/c planning in order to promote highest level of function in least restrictive environment  PT Treatment Day 1   Plan   Treatment/Interventions LE strengthening/ROM; Functional transfer training;Elevations; Therapeutic exercise; Endurance training;Patient/family training;Equipment eval/education; Bed mobility;Gait training; Compensatory technique education;Continued evaluation;Spoke to nursing;OT;Spoke to case management   Progress Progressing toward goals   PT Frequency 3-5x/wk   Recommendation   PT Discharge Recommendation Post acute rehabilitation services   AM-PAC Basic Mobility Inpatient   Turning in Flat Bed Without Bedrails 2   Lying on Back to Sitting on Edge of Flat Bed Without Bedrails 2   Moving Bed to Chair 3   Standing Up From Chair Using Arms 3   Walk in Room 3   Climb 3-5 Stairs With Railing 3   Basic Mobility Inpatient Raw Score 16   Basic Mobility Standardized Score 38 32   Highest Level Of Mobility   JH-HLM Goal 5: Stand one or more mins   JH-HLM Achieved 7: Walk 25 feet or more   Education   Education Provided Mobility training;Assistive device   Patient Reinforcement needed   End of Consult   Patient Position at End of Consult Bedside chair;Bed/Chair alarm activated; All needs within reach   End of Consult Comments OT present       Walter Hernandez, PT

## 2023-01-13 NOTE — OCCUPATIONAL THERAPY NOTE
Occupational Therapy Progress Note(time=3477-2401)     Patient Name: Lucila Lee  Today's Date: 1/13/2023  Problem List  Principal Problem:    Bacteremia due to Enterobacter species  Active Problems:    Coronary artery disease of native artery of native heart with stable angina pectoris (HCC)    Type 2 diabetes mellitus, with long-term current use of insulin (HCC)    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)    Gross hematuria    Continuous opioid dependence (HCC)    Stage 4 chronic kidney disease (HCC)    Chronic pleural effusion    Influenza A    Elevated brain natriuretic peptide (BNP) level    Nephrostomy status (HCC)    Acute on chronic blood loss anemia    History of pleural effusion    Severe protein-calorie malnutrition ([facility] [address] )            01/13/23 0935   Note Type   Note Type Treatment   Pain Assessment   Pain Assessment Tool 0-10   Pain Score 8   Pain Location/Orientation Location: Groin;Orientation: Lower; Location: Back   Pain Rating: FLACC (Rest) - Face 0   Pain Rating: FLACC (Rest) - Legs 0   Pain Rating: FLACC (Rest) - Activity 0   Pain Rating: FLACC (Rest) - Cry 0   Pain Rating: FLACC (Rest) - Consolability 0   Score: FLACC (Rest) 0   Restrictions/Precautions   Weight Bearing Precautions Per Order No   Other Precautions Fall Risk;Pain; Chair Alarm; Bed Alarm  (2 external drains)   ADL   Where Assessed Edge of bed   Eating Assistance 5  Supervision/Setup   Grooming Assistance 5  Supervision/Setup   UB Bathing Assistance 4  Minimal Assistance   LB Bathing Assistance 3  Moderate Assistance   UB Dressing Assistance 4  Minimal [address] 3  Moderate Assistance   Toileting Assistance  4  Minimal Assistance   Functional Standing Tolerance   Time 1-2mins   Bed Mobility   Rolling R 4  Minimal assistance   Additional items Assist x 1; Increased time required;Verbal cues;LE management   Rolling L 4  Minimal assistance   Additional items Assist x 1; Increased time required;Verbal cues;LE management   Supine to Sit 3  Moderate assistance   Additional items Assist x 1; Increased time required;Verbal cues;LE management   Transfers   Sit to Stand 4  Minimal assistance   Additional items Assist x 1; Increased time required;Verbal cues   Stand to Sit 4  Minimal assistance   Additional items Assist x 1; Increased time required;Verbal cues   Functional Mobility   Functional Mobility 4  Minimal assistance   Additional Comments x1   Additional items Rolling walker   Subjective   Subjective "I normally have back pain "   Cognition   Overall Cognitive Status Impaired   Arousal/Participation Cooperative   Attention Attends with cues to redirect   Orientation Level Oriented X4   Memory Decreased recall of recent events   Following Commands Follows one step commands with increased time or repetition   Activity Tolerance   Activity Tolerance Patient limited by fatigue;Patient limited by pain   Medical Staff Made Aware MARITZA pitt, MD, CM   Assessment   Assessment Pt seen for 25min tx session with focus on functional balance, functional mobility, ADL status, transfers safety, and cognition  Pt able to tolerate OOB mobility, sitting balance=f+/f, standing balance=f/f-  Pt demonstrating need for assistance with his UE and LE ADLs  Pt required verbal/physical assistance to maintain his transfer safety  Able to state good cognition(i e orientation, memory), but questionable judgement/safety noted  Pt continues to demonstrate appropriateness for inpt rehab to improve his overall level of independence  Will continue  Plan   Treatment Interventions ADL retraining;Functional transfer training;UE strengthening/ROM; Endurance training;Cognitive reorientation;Patient/family training;Equipment evaluation/education; Compensatory technique education;Continued evaluation   Goal Expiration Date 01/23/23   OT Treatment Day 2   OT Frequency 3-5x/wk   Recommendation   OT Discharge Recommendation Post acute rehabilitation services   AM-PAC Daily Activity Inpatient   Lower Body Dressing 2   Bathing 2   Toileting 2   Upper Body Dressing 3   Grooming 3   Eating 3   Daily Activity Raw Score 15   Daily Activity Standardized Score (Calc for Raw Score >=11) 34 69   AM-PAC Applied Cognition Inpatient   Following a Speech/Presentation 4   Understanding Ordinary Conversation 4   Taking Medications 3   Remembering Where Things Are Placed or Put Away 3   Remembering List of 4-5 Errands 3   Taking Care of Complicated Tasks 3   Applied Cognition Raw Score 20   Applied Cognition Standardized Score 41 76   Emerita Sharma

## 2023-01-13 NOTE — CASE MANAGEMENT
Case Management Discharge Planning Note    Patient name Saman Mann [place] [address] /E5 [address]-* MRN [id number]  : 1945 Date 2023       Current Admission Date: 2023  Current Admission Diagnosis:Bacteremia due to Enterobacter species   Patient Active Problem List    Diagnosis Date Noted   • Severe protein-calorie malnutrition ([facility] ) 2023   • History of pleural effusion 2023   • Nephrostomy status ([facility] ) 2023   • Acute on chronic blood loss anemia 2023   • Bacteremia due to Enterobacter species 2023   • Elevated brain natriuretic peptide (BNP) level 2023   • Ambulatory dysfunction 2023   • Influenza A 2023   • Elevated troponin 2023   • Right sided weakness 2022   • Stroke-like symptoms 2022   • Symptomatic hypotension 2022   • Insomnia 2022   • Right wrist drop 2022   • Chronic pleural effusion 2022   • UTI (urinary tract infection) 2022   • SOB (shortness of breath) 2022   • History of tobacco use disorder 2022   • Retroperitoneal lymphadenopathy 2022   • Pulmonary nodules 2022   • Syncope and collapse 2022   • Depression with suicidal ideation 2022   • Cancer related pain 2022   • Bilateral hydronephrosis 04/10/2022   • Stage 4 chronic kidney disease ([facility] ) 2022   • Fall 2022   • Hyperkalemia 2022   • Retained ureteral stent    • 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[facility] ) 2022   • Embolism and 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 ([place] [address] ) 2022   • Abnormal CT scan, bladder 2022   • Port-A-Cath in place 2022   • Continuous opioid dependence ([facility]  ) 2021   • Hematuria 10/24/2021   • Acute urinary retention 10/21/2021   • Chronic pain syndrome 2021   • Lumbar spondylosis    • [facility] bronchitis ([facility] ) 2021   • Moderate major depression, single episode ([facility] ) 2021   • Thrombocytopenia ([facility] ) 03/02/2021   • 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 03/02/2021   • Gross hematuria 02/09/2021   • PAD (peripheral artery disease) ([facility])    • Left carotid bruit    • Anemia of chronic disease 12/19/2020   • Preoperative clearance 12/19/2020   • Symptomatic anemia 10/10/2020   • Diabetic ulcer of left foot associated with type 2 diabetes mellitus, 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 ([facility] ) 10/08/2020   • Acute kidney injury superimposed on CKD ([facility] )    • Dysuria 08/17/2020   • Diabetic polyneuropathy associated with type 2 diabetes mellitus ([facility] ) 07/16/2020   • Abnormal MRI, lumbar spine 06/29/2020   • Malignant neoplasm of anterior wall of urinary bladder ([facility] )    • Secondary renal hyperparathyroidism ([facility] ) 02/12/2020   • Preop examination 04/16/2019   • Superficial phlebitis 03/07/2019   • Arteriosclerosis of artery of extremity ([facility] ) 02/22/2019   • Stable angina pectoris ([facility] ) 02/22/2019   • Herpes zoster without complication 63/03/7936   • Localized edema 02/22/2019   • Back pain 01/31/2019   • Degeneration of lumbar intervertebral disc 12/03/2018   • Primary osteoarthritis of left knee 03/16/2018   • Bladder carcinoma ([facility] ) 08/16/2016   • Presence of stent in coronary artery 08/16/2016   • Hypertension with renal disease 10/29/2015   • Hyperlipidemia 10/29/2015   • Cystitis due to intravesical BCG administration 09/24/2015   • Coronary artery disease of native artery of native heart with stable angina pectoris ([facility] ) 05/19/2011   • Type 2 diabetes mellitus, with long-term current use of insulin ([facility] ) 01/09/2004      LOS (days): 6  Geometric Mean LOS (GMLOS) (days): 5 10  Days to GMLOS:-0 4     OBJECTIVE:  Risk of Unplanned Readmission Score: 63 28         Current admission status: Inpatient   Preferred Pharmacy:   [address] [place] [address]   [address] [address]  Phone: [phone] Fax: [place] [address], [place] - [address] [facility]  [address] [address]  Phone: [phone] Fax: [phone]    Primary Care Provider: Kathe Shelton MD    Primary Insurance: MEDICARE  Secondary Insurance: BLUE CROSS    DISCHARGE DETAILS:    Updated therapy notes sent to [facility] re[facility] for review

## 2023-01-13 NOTE — PLAN OF CARE
Problem: Nutrition/Hydration-ADULT  Goal: Nutrient/Hydration intake appropriate for improving, restoring or maintaining nutritional needs  Description: Monitor and assess patient's nutrition/hydration status for malnutrition  Collaborate with interdisciplinary team and initiate plan and interventions as ordered  Monitor patient's weight and dietary intake as ordered or per policy  Utilize nutrition screening tool and intervene as necessary  Determine patient's food preferences and provide high-protein, high-caloric foods as appropriate       INTERVENTIONS:  - Monitor oral intake, urinary output, labs, and treatment plans  - Assess nutrition and hydration status and recommend course of action  - Evaluate amount of meals eaten  - Assist patient with eating if necessary   - Allow adequate time for meals  - Recommend/ encourage appropriate diets, oral nutritional supplements, and vitamin/mineral supplements  - Order, calculate, and assess calorie counts as needed  - Recommend, monitor, and adjust tube feedings and TPN/PPN based on assessed needs  - Assess need for intravenous fluids  - Provide specific nutrition/hydration education as appropriate  - Include patient/family/caregiver in decisions related to nutrition  Outcome: Progressing     Problem: Prexisting or High Potential for Compromised Skin Integrity  Goal: Skin integrity is maintained or improved  Description: INTERVENTIONS:  - Identify patients at risk for skin breakdown  - Assess and monitor skin integrity  - Assess and monitor nutrition and hydration status  - Monitor labs   - Assess for incontinence   - Turn and reposition patient  - Assist with mobility/ambulation  - Relieve pressure over bony prominences  - Avoid friction and shearing  - Provide appropriate hygiene as needed including keeping skin clean and dry  - Evaluate need for skin moisturizer/barrier cream  - Collaborate with interdisciplinary team   - Patient/family teaching  - Consider wound care consult   Outcome: Progressing     Problem: MOBILITY - ADULT  Goal: Maintain or return to baseline ADL function  Description: INTERVENTIONS:  -  Assess patient's ability to carry out ADLs; assess patient's baseline for ADL function and identify physical deficits which impact ability to perform ADLs (bathing, care of mouth/teeth, toileting, grooming, dressing, etc )  - Assess/evaluate cause of self-care deficits   - Assess range of motion  - Assess patient's mobility; develop plan if impaired  - Assess patient's need for assistive devices and provide as appropriate  - Encourage maximum independence but intervene and supervise when necessary  - Involve family in performance of ADLs  - Assess for home care needs following discharge   - Consider OT consult to assist with ADL evaluation and planning for discharge  - Provide patient education as appropriate  Outcome: Progressing  Goal: Maintains/Returns to pre admission functional level  Description: INTERVENTIONS:  - Perform BMAT or MOVE assessment daily    - Set and communicate daily mobility goal to care team and patient/family/caregiver  - Collaborate with rehabilitation services on mobility goals if consulted  - Perform Range of Motion 4 times a day  - Reposition patient every 2 hours    - Dangle patient 3 times a day  - Stand patient 3 times a day  - Ambulate patient 3 times a day  - Out of bed to chair 3 times a day   - Out of bed for meals 4 times a day  - Out of bed for toileting  - Record patient progress and toleration of activity level   Outcome: Progressing     Problem: Potential for Falls  Goal: Patient will remain free of falls  Description: INTERVENTIONS:  - Educate patient/family on patient safety including physical limitations  - Instruct patient to call for assistance with activity   - Consult OT/PT to assist with strengthening/mobility   - Keep Call bell within reach  - Keep bed low and locked with side rails adjusted as appropriate  - Keep care items and personal belongings within reach  - Initiate and maintain comfort rounds  - Make Fall Risk Sign visible to staff  - Offer Toileting every 2 Hours, in advance of need  - Initiate/Maintain bed alarm  - Obtain necessary fall risk management equipment: socks  - Apply yellow socks and bracelet for high fall risk patients  - Consider moving patient to room near nurses station  Outcome: Progressing     Problem: PAIN - ADULT  Goal: Verbalizes/displays adequate comfort level or baseline comfort level  Description: Interventions:  - Encourage patient to monitor pain and request assistance  - Assess pain using appropriate pain scale  - Administer analgesics based on type and severity of pain and evaluate response  - Implement non-pharmacological measures as appropriate and evaluate response  - Consider cultural and social influences on pain and pain management  - Notify physician/advanced practitioner if interventions unsuccessful or patient reports new pain  Outcome: Progressing     Problem: INFECTION - ADULT  Goal: Absence or prevention of progression during hospitalization  Description: INTERVENTIONS:  - Assess and monitor for signs and symptoms of infection  - Monitor lab/diagnostic results  - Monitor all insertion sites, i e  indwelling lines, tubes, and drains  - Monitor endotracheal if appropriate and nasal secretions for changes in amount and color  - [place] appropriate cooling/warming therapies per order  - Administer medications as ordered  - Instruct and encourage patient and family to use good hand hygiene technique  - Identify and instruct in appropriate isolation precautions for identified infection/condition  Outcome: Progressing  Goal: Absence of fever/infection during neutropenic period  Description: INTERVENTIONS:  - Monitor WBC    Outcome: Progressing     Problem: SAFETY ADULT  Goal: Maintain or return to baseline ADL function  Description: INTERVENTIONS:  -  Assess patient's ability to carry out ADLs; assess patient's baseline for ADL function and identify physical deficits which impact ability to perform ADLs (bathing, care of mouth/teeth, toileting, grooming, dressing, etc )  - Assess/evaluate cause of self-care deficits   - Assess range of motion  - Assess patient's mobility; develop plan if impaired  - Assess patient's need for assistive devices and provide as appropriate  - Encourage maximum independence but intervene and supervise when necessary  - Involve family in performance of ADLs  - Assess for home care needs following discharge   - Consider OT consult to assist with ADL evaluation and planning for discharge  - Provide patient education as appropriate  Outcome: Progressing  Goal: Maintains/Returns to pre admission functional level  Description: INTERVENTIONS:  - Perform BMAT or MOVE assessment daily    - Set and communicate daily mobility goal to care team and patient/family/caregiver  - Collaborate with rehabilitation services on mobility goals if consulted  - Perform Range of Motion 4 times a day  - Reposition patient every 2 hours    - Dangle patient 3 times a day  - Stand patient 3 times a day  - Ambulate patient 3 times a day  - Out of bed to chair 3 times a day   - Out of bed for meals 3 times a day  - Out of bed for toileting  - Record patient progress and toleration of activity level   Outcome: Progressing  Goal: Patient will remain free of falls  Description: INTERVENTIONS:  - Educate patient/family on patient safety including physical limitations  - Instruct patient to call for assistance with activity   - Consult OT/PT to assist with strengthening/mobility   - Keep Call bell within reach  - Keep bed low and locked with side rails adjusted as appropriate  - Keep care items and personal belongings within reach  - Initiate and maintain comfort rounds  - Make Fall Risk Sign visible to staff  - Offer Toileting every 2 Hours, in advance of need  - Initiate/Maintain bed alarm  - Obtain necessary fall risk management equipment: socks   - Apply yellow socks and bracelet for high fall risk patients  - Consider moving patient to room near nurses station  Outcome: Progressing     Problem: GENITOURINARY - ADULT  Goal: Maintains or returns to baseline urinary function  Description: INTERVENTIONS:  - Assess urinary function  - Encourage oral fluids to ensure adequate hydration if ordered  - Administer IV fluids as ordered to ensure adequate hydration  - Administer ordered medications as needed  - Offer frequent toileting  - Follow urinary retention protocol if ordered  Outcome: Progressing  Goal: Absence of urinary retention  Description: INTERVENTIONS:  - Assess patient’s ability to void and empty bladder  - Monitor I/O  - Bladder scan as needed  - Discuss with physician/AP medications to alleviate retention as needed  - Discuss catheterization for long term situations as appropriate  Outcome: Progressing  Goal: Urinary catheter remains patent  Description: INTERVENTIONS:  - Assess patency of urinary catheter  - If patient has a chronic wagner, consider changing catheter if non-functioning  - Follow guidelines for intermittent irrigation of non-functioning urinary catheter  Outcome: Progressing     Problem: HEMATOLOGIC - ADULT  Goal: Maintains hematologic stability  Description: INTERVENTIONS  - Assess for signs and symptoms of bleeding or hemorrhage  - Monitor labs  - Administer supportive blood products/factors as ordered and appropriate  Outcome: Progressing     Problem: MUSCULOSKELETAL - ADULT  Goal: Maintain or return mobility to safest level of function  Description: INTERVENTIONS:  - Assess patient's ability to carry out ADLs; assess patient's baseline for ADL function and identify physical deficits which impact ability to perform ADLs (bathing, care of mouth/teeth, toileting, grooming, dressing, etc )  - Assess/evaluate cause of self-care deficits   - Assess range of motion  - Assess patient's mobility  - Assess patient's need for assistive devices and provide as appropriate  - Encourage maximum independence but intervene and supervise when necessary  - Involve family in performance of ADLs  - Assess for home care needs following discharge   - Consider OT consult to assist with ADL evaluation and planning for discharge  - Provide patient education as appropriate  Outcome: Progressing  Goal: Maintain proper alignment of affected body part  Description: INTERVENTIONS:  - Support, maintain and protect limb and body alignment  - Provide patient/ family with appropriate education  Outcome: Progressing

## 2023-01-13 NOTE — ASSESSMENT & PLAN NOTE
· History of hematuria due to bladder cancer  · Hg 7 1 prior to transfer  Received 1 unit PRBC  · Iron panel showing continued deficiency-unable to utilize IV iron given bacteremia  · Seen in consult by hematology oncology    Hemoglobin 8 2 > 8 1 > 8 0 > 7 9    Will see where we are tomorrow     If still trending down, will transfuse

## 2023-01-13 NOTE — ASSESSMENT & PLAN NOTE
· Patient was transferred from [address] [place] [address] due to fever and positive blood cultures  · Blood culture growing Enterobacter  · Port-A-Cath removed by interventional radiology, follow-up culture of tip  · Repeat blood cultures no growth at 72 hours  · Spoke with ID    They are changing Cefepime to oral levaquin    Patient can be discharged once we have rehab placement

## 2023-01-13 NOTE — PROGRESS NOTES
Progress Note - Urology  Lanre Morley 1945, 76 y o  male MRN: [id number]    Unit/Bed#: E5 -01 Encounter: [id number]    Assessment/Plan:  1  High grade treatment refractory bladder cancer from 1995 to current, several intravesical therapies in those decades, now s/p chemoradiation 2021; malignant bilateral ureteral obstruction managed with bilateral nephrostomy tubes in addition to right ureteral stent for upper tract bleeding tamponade attempt new stent from 12/14/22- plan was for ureteral stent exchange in 3 months with possible TURBT at that time  On immunotherapy managed by medical oncology  Bilateral PCN exchanges with IR on 1/10, Left PCN draining clear yellow urine, Right PCN draining clear punch colored urine without clots new from yesterday  Right PCN was flushed with 10 cc sterile saline, patent and draining to gravity clear urine  Hgb is stable, bleeding is likely secondary to indwelling ureteral stent      2  Symptomatic anemia with near syncope- resolved s/p blood transfusion 1/2/23; Hgb 7 9 today stable at baseline      3  Enterobacter cloacae bacteremia- presumed urinary source though his usual urinary organism is e coli  His port-a-cath was removed and cultured 1/10 also as possible source but had no issues with it  Tolerating antibiotics well      4  Gross hematuria- Patient has remained off CBI since 1/10 with only manual irrigations  Urine dark red in bag, but clear punch colored in tubing  Manually irrigating well with minimal, small clots improved from bigger more abundant clots 2 days ago  Draining well  Plan: Bleeding is stable and unchanged over the past 2 days  Continue q4h hand irrigation or sooner if needed for tube patency and continue upon discharge  Continue home rx ditropan 5mg TID for bladder spasm   Unfortunately he will likely continue to have hematuria as a result of his indwelling ureteral stent (for hydroureteronephrosis despite pcn) in combination with radiation cystitis (bladder radiation 2021)  Hopeful this will improve if removal of the ureteral stent at the next planned interval as long as his kidney function does okay without the stent  If hematuria were to persist despite removal of the indwelling ureteral stent consideration for possible hyperbaric oxygenation  Our office will contact him upon discharge to schedule follow-up  Subjective: Malou is lying in bed eating breakfast and resting comfortably  He denies any pain at this time  He is tolerating his meals and offers no complaints  Review of Systems   Constitutional: Negative for chills and fever  HENT: Negative for congestion and sore throat  Respiratory: Negative for cough and shortness of breath  Cardiovascular: Negative for chest pain and leg swelling  Gastrointestinal: Negative for abdominal pain, constipation, diarrhea, nausea and vomiting  Genitourinary: Positive for hematuria  Negative for difficulty urinating, dysuria, flank pain, frequency and urgency  Musculoskeletal: Negative for back pain and gait problem  Skin: Negative for wound  Allergic/Immunologic: Negative for immunocompromised state  Neurological: Positive for weakness  Negative for dizziness and numbness  Hematological: Does not bruise/bleed easily  Objective:  Nursing Rounds:   Vitals: Blood pressure 118/65, pulse 78, temperature 98 6 °F (37 °C), resp  rate 18, height 6' 4" (1 93 m), weight 87 9 kg (193 lb 12 6 oz), SpO2 98 %  ,Body mass index is 23 59 kg/m²  Physical Exam  Vitals reviewed  Constitutional:       General: He is not in acute distress  Appearance: Normal appearance  He is not ill-appearing or toxic-appearing  HENT:      Head: Normocephalic and atraumatic  Eyes:      General: No scleral icterus  Conjunctiva/sclera: Conjunctivae normal    Cardiovascular:      Rate and Rhythm: Normal rate  Pulmonary:      Effort: Pulmonary effort is normal  No respiratory distress  Abdominal:      Tenderness: There is no right CVA tenderness or left CVA tenderness  Hernia: No hernia is present  Genitourinary:     Comments: Shirley catheter draining to gravity clear punched colored urine in drainage tubing  Left PCN draining clear yellow urine  Right PCN with clear punch colored urine  Musculoskeletal:      Cervical back: Normal range of motion  Right lower leg: No edema  Left lower leg: No edema  Skin:     General: Skin is warm and dry  Coloration: Skin is not jaundiced or pale  Neurological:      General: No focal deficit present  Mental Status: He is alert and oriented to person, place, and time  Mental status is at baseline  Gait: Gait normal    Psychiatric:         Mood and Affect: Mood normal          Behavior: Behavior normal          Thought Content: Thought content normal          Judgment: Judgment normal          Imaging:    Imaging reviewed - both report and images personally reviewed       Labs:  Recent Labs     01/11/23  0848 01/12/23  0541 01/13/23  0531   WBC 14 93* 12 77* 11 73*     Recent Labs     01/11/23  0848 01/11/23  1636 01/12/23  0541 01/13/23  0531   HGB 8 2* 8 1* 8 0* 7 9*     Recent Labs     01/11/23  0848 01/11/23  1636 01/12/23  0541 01/13/23  0531   HCT 25 3* 25 3* 25 7* 25 5*     Recent Labs     01/11/23  0848 01/12/23  0541 01/13/23  0531   CREATININE 3 02* 3 12* 3 14*         History:  Past Medical History:   Diagnosis Date   • Anemia     Last assessed: 9/28/17   • Anxiety    • Arteriosclerotic cardiovascular disease     Last assessed: 9/28/17   • Arthritis    • Bladder cancer ([facility] [address] )     bladder- had BCG treatments   • Chronic kidney disease     Stage IV   • CKD (chronic kidney disease) stage 4, GFR 15-29 ml/min ([facility])    • Colon polyp    • Coronary artery disease     7 stents   • Depression    • Diabetes mellitus (HCC)     IDDM   • GERD (gastroesophageal reflux disease)    • Glaucoma    • Hematuria    • History of fusion of cervical spine    • Hyperlipidemia    • Hypertension    • Insomnia     Last assessed: 12   • Loss of hearing     has hearing aids but usually does not wear them   • Lung cancer [facility])    • Metastatic cancer (HCC)    • Other seasonal allergic rhinitis     Last assessed: 2/10/16   • PAD (peripheral artery disease) ([facility])    • Shortness of breath     on exertion   • Spinal stenosis of lumbar region    • Transient cerebral ischemia     No Residual   • Uses walker     w/c for longer distances     Social History     Socioeconomic History   • Marital status: /Civil Union     Spouse name: Not on file   • Number of children: Not on file   • Years of education: Not on file   • Highest education level: Not on file   Occupational History   • Not on file   Tobacco Use   • Smoking status: Former     Packs/day: 3 00     Years: 27 00     Pack years: 81 00     Types: Cigarettes     Quit date: 1970     Years since quittin 0   • Smokeless tobacco: Never   Vaping Use   • Vaping Use: Never used   Substance and Sexual Activity   • Alcohol use: Never     Comment: beer / liquor   • Drug use: Not Currently     Types: Marijuana     Comment: quit 2019 had medical marijuana   • Sexual activity: Not Currently   Other Topics Concern   • Not on file   Social History Narrative    Consumes 1 cup of coffee and 1 soda per day     Social Determinants of Health     Financial Resource Strain: Not on file   Food Insecurity: No Food Insecurity   • Worried About Running Out of Food in the Last Year: Never true   • Ran Out of Food in the Last Year: Never true   Transportation Needs: No Transportation Needs   • Lack of Transportation (Medical): No   • Lack of Transportation (Non-Medical):  No   Physical Activity: Not on file   Stress: Not on file   Social Connections: Not on file   Intimate Partner Violence: Not on file   Housing Stability: Low Risk    • Unable to Pay for Housing in the Last Year: No   • Number of Places Lived in the Last Year: 1   • Unstable Housing in the Last Year: No     Past Surgical History:   Procedure Laterality Date   • CARDIAC SURGERY      Cath stent placement  Last assessed: 3/9/17  Interventional Catheterization   • CHOLECYSTECTOMY     • COLONOSCOPY     • CYSTOSCOPY      Diagnostic w/biopsy  Estephanie Thompson  Last assessed: 12/1/14   • CYSTOSCOPY N/A 04/12/2022    Procedure: CYSTOSCOPY  Bladder biopsies  ;  Surgeon: Jayden Turner MD;  [facility];  Service: Urology   • CYSTOSCOPY W/ RETROGRADES Right 03/01/2022    Procedure: CYSTO; stent removal retrograde;  Surgeon: Jayden Turner MD;  [facility];  Service: Urology   • CYSTOSCOPY W/ URETERAL STENT PLACEMENT Bilateral 10/18/2021    Procedure: bilateral retrogrades, cytology collection;  Surgeon: Jayden Turner MD;  [facility];  Service: Urology   • CYSTOURETHROSCOPY      w/cautery  Estephanie Thompson   • FL RETROGRADE PYELOGRAM  10/18/2021   • FL RETROGRADE PYELOGRAM  10/24/2021   • FL RETROGRADE PYELOGRAM  12/14/2022   • GASTRIC BYPASS      For morbid obesity w/Shaji-en-Y   Resolved: 11/17/09   • INCISION AND DRAINAGE OF WOUND Right 02/26/2017    Procedure: INCISION AND DRAINAGE (I&D) EXTREMITY WITH APPLICATION OF GRAFT JACKET;  Surgeon: Bonita Baron DPM;  [facility];  Service:    • INCISION AND DRAINAGE OF WOUND Right 04/25/2017    Procedure: INCISION AND DRAINAGE (I&D) EXTREMITY, APPLICATION OF GRAFT;  Surgeon: Bonita Baron DPM;  [facility];  Service:    • IR BIOPSY OTHER  07/02/2020   • IR LOWER EXTREMITY ANGIOGRAM  02/08/2021   • IR LOWER EXTREMITY ANGIOGRAM  02/11/2021   • IR NEPHROSTOMY TUBE CHECK/CHANGE/REPOSITION/REINSERTION/UPSIZE  04/28/2022   • IR NEPHROSTOMY TUBE CHECK/CHANGE/REPOSITION/REINSERTION/UPSIZE  05/24/2022   • IR NEPHROSTOMY TUBE CHECK/CHANGE/REPOSITION/REINSERTION/UPSIZE  06/07/2022   • IR NEPHROSTOMY TUBE CHECK/CHANGE/REPOSITION/REINSERTION/UPSIZE  07/28/2022   • IR NEPHROSTOMY TUBE CHECK/CHANGE/REPOSITION/REINSERTION/UPSIZE  11/15/2022   • IR NEPHROSTOMY TUBE CHECK/CHANGE/REPOSITION/REINSERTION/UPSIZE  1/10/2023   • IR NEPHROSTOMY TUBE PLACEMENT  02/25/2022   • IR PORT PLACEMENT  01/17/2022   • IR PORT REMOVAL  1/10/2023   • IR THORACENTESIS  09/02/2022   • IR THORACENTESIS  09/14/2022   • IR THORACENTESIS WITH TUBE PLACEMENT Left     october   • IR TUNNELED CENTRAL LINE PLACEMENT  12/24/2020   • JOINT REPLACEMENT      christofer knees replaced   • DC AMPUTATION METATARSAL W/TOE SINGLE Left 12/21/2020    Procedure: RAY RESECTION FOOT;  Surgeon: Ignacio Lynn DPM;  [facility];  Service: Podiatry   • DC AMPUTATION METATARSAL W/TOE SINGLE Left 12/31/2020    Procedure: 5TH MET RESECTION;  Surgeon: Ignacio Lynn DPM;  [facility];  Service: Podiatry   • DC CYSTO BLADDER W/URETERAL CATHETERIZATION Right 12/14/2022    Procedure: CYSTOSCOPY WITH RETROGRADE PYELOGRAM and CLOT EVACUATION, RIGHT STENT INSERTION, FULGRATION OF BLEEDING POINTS;  Surgeon: Gabriele Cavanaugh MD;  [facility];  Service: Urology   • DC CYSTO W/IRRIG & EVAC MULTPLE OBSTRUCTING CLOTS N/A 02/10/2021    Procedure: CYSTOSCOPY EVACUATION OF CLOTS, fulguration;  Surgeon: Isaias Foster MD;  [facility];  Service: Urology   • DC CYSTO W/IRRIG & EVAC MULTPLE OBSTRUCTING CLOTS N/A 10/24/2021    Procedure: CYSTOSCOPY EVACUATION OF CLOT, fulguration of bleeding vessels, right ureter stent placement, retrograde pyelogram;  Surgeon: Marco Olsen MD;  [facility];  Service: Urology   • DC CYSTO W/REMOVAL OF LESIONS SMALL N/A 11/19/2020    Procedure: CYSTO W/BIOPSIES, transurethral prostate bx;  Surgeon: Darian Buitrago MD;  [facility];  Service: Urology   • DC CYSTOURETHROSCOPY W/DEST &/RMVL TUMOR LARGE Bilateral 10/18/2021    Procedure: TRANSURETHRAL RESECTION OF BLADDER TUMOR (TURBT);   Surgeon: Atif Camilo MD;  [facility];  Service: Urology   • DC CYSTOURETHROSCOPY WITH BIOPSY N/A 08/16/2016    Procedure: CYSTOSCOPY WITH BIOPSIES;  Surgeon: Angelika Stroud MD;  [facility];  Service: Urology   • IA Giorgio Chavira 3RD+ ORD [address] PEL/TR [facility] Left 02/08/2021    Procedure: LEG angiogram, CO2 w/limited contrast with balloon angioplasty postertior tibial artery;  Surgeon: Rebecca Tam MD;  [facility];  Service: Vascular   • ROTATOR CUFF REPAIR     • SMALL INTESTINE SURGERY      Surgery Shaji-en-Y   • SPINAL FUSION      lumbar and cervical fusions   • VAC DRESSING APPLICATION Right 36/35/1943    Procedure: APPLICATION VAC DRESSING;  Surgeon: Ananda Garcia DPM;  [facility];  Service:    • WOUND DEBRIDEMENT Left 02/16/2021    Procedure: FOOT DEBRIDE, [address] OUT w/graft application;  Surgeon: Ananda Garcia DPM;  [facility];  Service: Podiatry     Family History   Problem Relation Age of Onset   • Diabetes Mother    • Heart disease Mother    • Other Mother         High blood pressure   • Heart disease Father    • Diabetes Sister    • Other Sister         High blood pressure   • Kidney disease Sister    • Heart disease Brother    • Other Brother         High blood pressure       EVANGELINA Biggs  Date: 1/13/2023 Time: 9:12 AM

## 2023-01-13 NOTE — PLAN OF CARE
Problem: PHYSICAL THERAPY ADULT  Goal: Performs mobility at highest level of function for planned discharge setting  See evaluation for individualized goals  Description: Treatment/Interventions: Functional transfer training, LE strengthening/ROM, Elevations, Therapeutic exercise, Cognitive reorientation, Patient/family training, Equipment eval/education, Bed mobility, Gait training, Compensatory technique education, Continued evaluation, Spoke to nursing, OT          See flowsheet documentation for full assessment, interventions and recommendations  Outcome: Progressing  Note: Prognosis: Fair  Problem List: Decreased strength, Decreased endurance, Impaired balance, Decreased mobility, Decreased cognition, Impaired judgement, Decreased safety awareness, Decreased skin integrity, Pain  Assessment: Rosa Clemens continues to report severe pain in groin; pain especially w bed mobility otherwise in no apparent distress throughout session  Demonstrates progress towards goals today including improved am-pac score, decreased assist for transfers and ambulation, increased walking distances  Continues to rely on bed height elevated to transfer  Noted gait deviations as well as limited endurance contributing to fall risk  Currently ambulating household distances w increased time to complete/ slower gait speeds  Would continue to benefit from therapy services to facilitate recovery and optimize mobility to reduce caregiver burden  Barriers to Discharge: Inaccessible home environment, Decreased caregiver support     PT Discharge Recommendation: Post acute rehabilitation services    See flowsheet documentation for full assessment

## 2023-01-13 NOTE — ASSESSMENT & PLAN NOTE
Lab Results   Component Value Date    HGBA1C 7 2 (H) 10/20/2022     · Currently receiving Lantus 15 units with sliding scale TID  · ADA diet  · Family reports allergy to humalog, so that is not being used    Recent Labs     01/12/23  2101 01/13/23  0742 01/13/23  1132 01/13/23  1614   POCGLU 191* 196* 199* 223*       Blood Sugar Average: Last 72 hrs:  (P) 549 [id number]

## 2023-01-14 LAB
ANION GAP SERPL CALCULATED.3IONS-SCNC: 8 MMOL/L (ref 4–13)
BUN SERPL-MCNC: 48 MG/DL (ref 5–25)
CALCIUM SERPL-MCNC: 9.2 MG/DL (ref 8.3–10.1)
CHLORIDE SERPL-SCNC: 103 MMOL/L (ref 96–108)
CO2 SERPL-SCNC: 26 MMOL/L (ref 21–32)
CREAT SERPL-MCNC: 3.19 MG/DL (ref 0.6–1.3)
ERYTHROCYTE [DISTWIDTH] IN BLOOD BY AUTOMATED COUNT: 15.8 % (ref 11.6–15.1)
GFR SERPL CREATININE-BSD FRML MDRD: 17 ML/MIN/1.73SQ M
GLUCOSE SERPL-MCNC: 120 MG/DL (ref 65–140)
GLUCOSE SERPL-MCNC: 130 MG/DL (ref 65–140)
GLUCOSE SERPL-MCNC: 155 MG/DL (ref 65–140)
GLUCOSE SERPL-MCNC: 157 MG/DL (ref 65–140)
GLUCOSE SERPL-MCNC: 240 MG/DL (ref 65–140)
HCT VFR BLD AUTO: 25.9 % (ref 36.5–49.3)
HGB BLD-MCNC: 8 G/DL (ref 12–17)
MCH RBC QN AUTO: 29.5 PG (ref 26.8–34.3)
MCHC RBC AUTO-ENTMCNC: 30.9 G/DL (ref 31.4–37.4)
MCV RBC AUTO: 96 FL (ref 82–98)
PLATELET # BLD AUTO: 270 THOUSANDS/UL (ref 149–390)
PMV BLD AUTO: 9.8 FL (ref 8.9–12.7)
POTASSIUM SERPL-SCNC: 4.5 MMOL/L (ref 3.5–5.3)
RBC # BLD AUTO: 2.71 MILLION/UL (ref 3.88–5.62)
SODIUM SERPL-SCNC: 137 MMOL/L (ref 135–147)
WBC # BLD AUTO: 12.38 THOUSAND/UL (ref 4.31–10.16)

## 2023-01-14 RX ADMIN — ACETAMINOPHEN 975 MG: 325 TABLET, FILM COATED ORAL at 05:14

## 2023-01-14 RX ADMIN — OXYBUTYNIN CHLORIDE 5 MG: 5 TABLET ORAL at 15:31

## 2023-01-14 RX ADMIN — OXYCODONE HYDROCHLORIDE 10 MG: 10 TABLET ORAL at 21:51

## 2023-01-14 RX ADMIN — CYANOCOBALAMIN TAB 500 MCG 1000 MCG: 500 TAB at 11:20

## 2023-01-14 RX ADMIN — ACETAMINOPHEN 975 MG: 325 TABLET, FILM COATED ORAL at 15:31

## 2023-01-14 RX ADMIN — ARIPIPRAZOLE 2 MG: 2 TABLET ORAL at 11:20

## 2023-01-14 RX ADMIN — FUROSEMIDE 20 MG: 20 TABLET ORAL at 11:20

## 2023-01-14 RX ADMIN — B-COMPLEX W/ C & FOLIC ACID TAB 1 TABLET: TAB at 11:19

## 2023-01-14 RX ADMIN — METOPROLOL TARTRATE 25 MG: 25 TABLET, FILM COATED ORAL at 21:51

## 2023-01-14 RX ADMIN — INSULIN GLARGINE 15 UNITS: 100 INJECTION, SOLUTION SUBCUTANEOUS at 21:51

## 2023-01-14 RX ADMIN — EZETIMIBE 10 MG: 10 TABLET ORAL at 11:22

## 2023-01-14 RX ADMIN — OXYBUTYNIN CHLORIDE 5 MG: 5 TABLET ORAL at 11:20

## 2023-01-14 RX ADMIN — GABAPENTIN 300 MG: 300 CAPSULE ORAL at 21:51

## 2023-01-14 RX ADMIN — BIMATOPROST 1 DROP: 0.1 SOLUTION/ DROPS OPHTHALMIC at 21:54

## 2023-01-14 RX ADMIN — PANTOPRAZOLE SODIUM 20 MG: 20 TABLET, DELAYED RELEASE ORAL at 05:14

## 2023-01-14 RX ADMIN — METOPROLOL TARTRATE 25 MG: 25 TABLET, FILM COATED ORAL at 11:19

## 2023-01-14 RX ADMIN — LEVOFLOXACIN 750 MG: 750 TABLET, FILM COATED ORAL at 11:20

## 2023-01-14 RX ADMIN — OXYBUTYNIN CHLORIDE 5 MG: 5 TABLET ORAL at 21:51

## 2023-01-14 RX ADMIN — ACETAMINOPHEN 975 MG: 325 TABLET, FILM COATED ORAL at 21:51

## 2023-01-14 RX ADMIN — CALCITRIOL 0.25 MCG: 0.25 CAPSULE, LIQUID FILLED ORAL at 11:19

## 2023-01-14 NOTE — PROGRESS NOTES
[address] [place] [address]  Progress Note - Claudio Dunbar 1945, 76 y o  male MRN: [id number]  Unit/Bed#: E5 -01 Encounter: [id number]  Primary Care Provider: Piter Ocasio MD   Date and time admitted to hospital: 1/7/2023 10:17 PM    * Bacteremia due to Enterobacter species  Assessment & Plan  · Patient was transferred from [facility] due to fever and positive blood cultures  · Blood culture growing Enterobacter  · Port-A-Cath removed by interventional radiology  · Repeat blood cultures no growth at 72 hours  · Spoke with ID  They are changing Cefepime to oral levaquin    Patient can be discharged once we have rehab placement    Loop recorder is not in blood stream and is not thought to be infected  Gross hematuria  Assessment & Plan  · Patient has persistent hematuria due to bladder cancer  History of recurrent UTI on suppressive Bactrim  · CT A/P: Stable bladder wall thickening, compatible with known malignancy  Increased hyperdensity within the bladder lumen, compatible with new blood clot  · Recent admission at [facility] 12/12-12/22 for hematuria and obstructive uropathy in the setting of known bladder cancer s/p BCG and subsequent chemotherapy/radiation s/p bilateral chronic PCN tubes  Noted to have new right-sided hydronephrosis on CT s/p cystoscopy, clot evacuation w/ right ureteral stenting on 12/14 by Dr Kristan Dowd  · UA bloody, innumerable RBC/WBC, +leuks, no nitrites  · Patient did need CBI during the hospitalization but that has been discontinued  · He is now on hand irrigation  · I spoke with urology and he is ok to discharge with continued hand irrigation    Acute on chronic blood loss anemia  Assessment & Plan  · History of hematuria due to bladder cancer  · Hg 7 1 prior to transfer   Received 1 unit PRBC  · Iron panel showing continued deficiency-unable to utilize IV iron given bacteremia  · Seen in consult by hematology oncology    Hemoglobin 8 2 > 8 1 > 8 0 > 7 9 > 8    Transfuse if a significant drop below 8    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)  Assessment & Plan  · 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  tx BCG, recurrence with CIS in  BCG again  BCG refractory disease with pelvic metastases now s/p chemoradiation , on current immunotherapy  · Chronic B/L PCN tubes  · CT A/P : Stable bladder wall thickening, compatible with known malignancy  Stable metastatic retroperitoneal lymphadenopathy  Stable epicardial metastasis  · Per oncology note on : Chemotherapy treatment should remain on hold for now untill he is clinically stable/discharged; particularly since he is positive flu  To follow-up with primary oncologist after discharge for reevaluation prior to resume treatment  · Port removed with bacteremia    Type 2 diabetes mellitus, with long-term current use of insulin (HCC)  Assessment & Plan  Lab Results   Component Value Date    HGBA1C 7 2 (H) 10/20/2022     · Currently receiving Lantus 15 units with sliding scale TID  · ADA diet  · Family reports allergy to humalog, so that is not being used    Recent Labs     23  2056 23  0723 23  1144 23  1631   POCGLU 262* 120 240* 157*       Blood Sugar Average: Last 72 hrs:  (P) 191 [id number]          Subjective:   Feels ok  No abdominal pain  He did have some questions about rehab which were answered  Objective:     Vitals:   Temp (24hrs), Av 4 °F (36 3 °C), Min:96 9 °F (36 1 °C), Max:97 9 °F (36 6 °C)    Temp:  [96 9 °F (36 1 °C)-97 9 °F (36 6 °C)] 97 9 °F (36 6 °C)  HR:  [76-95] 95  Resp:  [16-17] 16  BP: (115-142)/(67-74) 118/67  SpO2:  [96 %-100 %] 97 %  Body mass index is 23 59 kg/m²  Input and Output Summary (last 24 hours):        Intake/Output Summary (Last 24 hours) at 2023 1700  Last data filed at 2023 1632  Gross per 24 hour   Intake --   Output 3235 ml   Net -3235 ml       Physical Exam:     Physical Exam  Vitals and nursing note reviewed  HENT:      Head: Normocephalic and atraumatic  Eyes:      Pupils: Pupils are equal, round, and reactive to light  Cardiovascular:      Rate and Rhythm: Normal rate and regular rhythm  Heart sounds: No murmur heard  No friction rub  No gallop  Pulmonary:      Effort: Pulmonary effort is normal       Breath sounds: Normal breath sounds  No wheezing or rales  Abdominal:      General: Bowel sounds are normal       Palpations: Abdomen is soft  Tenderness: There is no abdominal tenderness  Genitourinary:     Comments: Gross hematuria in wagner but it is improving  Musculoskeletal:      Right lower leg: No edema  Left lower leg: No edema          Additional Data:     Labs:    Results from last 7 days   Lab Units 01/14/23  0510 01/13/23  0531   WBC Thousand/uL 12 38* 11 73*   HEMOGLOBIN g/dL 8 0* 7 9*   HEMATOCRIT % 25 9* 25 5*   PLATELETS Thousands/uL 270 224   NEUTROS PCT %  --  68   LYMPHS PCT %  --  17   MONOS PCT %  --  7   EOS PCT %  --  7*     Results from last 7 days   Lab Units 01/14/23  0510 01/13/23  0531 01/12/23  0541   POTASSIUM mmol/L 4 5   < > 4 7   CHLORIDE mmol/L 103   < > 105   CO2 mmol/L 26   < > 26   BUN mg/dL 48*   < > 55*   CREATININE mg/dL 3 19*   < > 3 12*   CALCIUM mg/dL 9 2   < > 9 5   ALK PHOS U/L  --   --  133*   ALT U/L  --   --  23   AST U/L  --   --  28    < > = values in this interval not displayed           Results from last 7 days   Lab Units 01/14/23  1631 01/14/23  1144 01/14/23  0723 01/13/23  2056 01/13/23  1614 01/13/23  1132 01/13/23  0742 01/12/23  2101 01/12/23  1608 01/12/23  1130 01/12/23  0738 01/11/23  2048   POC GLUCOSE mg/dl 157* 240* 120 262* 223* 199* 196* 191* 165* 150* 166* 371*               * I Have Reviewed All Lab Data     Recent Cultures (last 7 days):             Last 24 Hours Medication List:   Current Facility-Administered Medications   Medication Dose Route Frequency Provider Last Rate   • acetaminophen  975 mg Oral [facility] PA-C     • ALPRAZolam  0 25 mg Oral BID PRN Domínguez Pleasant, CRNP     • ARIPiprazole  2 mg Oral Daily Domínguez Pleasant, CRNP     • azelastine  1 spray Each Nare BID PRN Domínguez Pleasant, CRNP     • bimatoprost  1 drop Both Eyes HS Domínguez Pleasant, CRNP     • calcitriol  0 25 mcg Oral Daily Domínguez Pleasant, CRNP     • cyanocobalamin  1,000 mcg Oral Daily Domínguez Pleasant, CRNP     • ezetimibe  10 mg Oral Daily Domínguez Pleasant, CRNP     • furosemide  20 mg Oral Daily Domínguez Pleasant, CRNP     • gabapentin  300 mg Oral HS Domínguez Pleasant, CRNP     • HYDROmorphone  0 2 mg Intravenous 4x Daily PRN Domínguez Pleasant, CRNP     • insulin glargine  15 Units Subcutaneous HS Heather Suzon Fleischer, PA-C     • levofloxacin  750 mg Oral Every Other Day Burgess Amanda MD     • metoprolol tartrate  25 mg Oral Q12H [facility], CRNP     • multivitamin stress formula  1 tablet Oral Daily Domínguez Pleasant, CRNP     • ondansetron  4 mg Intravenous Q6H PRN Domínguez Pleasant, CRNP     • oxybutynin  5 mg Oral TID Amrit Meek, CRNP     • oxyCODONE  10 mg Oral TID PRN Domínguez Pleasant, CRNP     • pantoprazole  20 mg Oral Early Morning Domínguez Pleasant, CRNP     • polyethylene glycol  17 g Oral Daily PRN Domínguez Pleasant, CRNP     • senna  2 tablet Oral BID PRN Claudine Mcclelland DO     • simethicone  80 mg Oral 4x Daily PRN Domínguez Pleasant, CRNP           VTE Pharmacologic Prophylaxis:   Pharmacologic: none with rehab      Current Length of Stay: 7 day(s)    Current Patient Status: Inpatient       Discharge Plan: stable for rehab when bed available  Code Status: Level 1 - Full Code           Today, Patient Was Seen By: Sirena Acosta DO    ** Please Note: Dictation voice to text software may have been used in the creation of this document   **

## 2023-01-14 NOTE — ASSESSMENT & PLAN NOTE
Lab Results   Component Value Date    HGBA1C 7 2 (H) 10/20/2022     · Currently receiving Lantus 15 units with sliding scale TID  · ADA diet  · Family reports allergy to humalog, so that is not being used    Recent Labs     01/13/23 2056 01/14/23 0723 01/14/23  1144 01/14/23  1631   POCGLU 262* 120 240* 157*       Blood Sugar Average: Last 72 hrs:  (P) 374 [id number]

## 2023-01-14 NOTE — ASSESSMENT & PLAN NOTE
· Patient has persistent hematuria due to bladder cancer  History of recurrent UTI on suppressive Bactrim  · CT A/P: Stable bladder wall thickening, compatible with known malignancy  Increased hyperdensity within the bladder lumen, compatible with new blood clot  · Recent admission at [facility] 12/12-12/22 for hematuria and obstructive uropathy in the setting of known bladder cancer s/p BCG and subsequent chemotherapy/radiation s/p bilateral chronic PCN tubes  Noted to have new right-sided hydronephrosis on CT s/p cystoscopy, clot evacuation w/ right ureteral stenting on 12/14 by Dr Dipesh Adam  · UA bloody, innumerable RBC/WBC, +leuks, no nitrites  · Patient did need CBI during the hospitalization but that has been discontinued  · He is now on hand irrigation    · I spoke with urology and he is ok to discharge with continued hand irrigation

## 2023-01-14 NOTE — ASSESSMENT & PLAN NOTE
· History of hematuria due to bladder cancer  · Hg 7 1 prior to transfer   Received 1 unit PRBC  · Iron panel showing continued deficiency-unable to utilize IV iron given bacteremia  · Seen in consult by hematology oncology    Hemoglobin 8 2 > 8 1 > 8 0 > 7 9 > 8    Transfuse if a significant drop below 8

## 2023-01-14 NOTE — PLAN OF CARE
Problem: Nutrition/Hydration-ADULT  Goal: Nutrient/Hydration intake appropriate for improving, restoring or maintaining nutritional needs  Description: Monitor and assess patient's nutrition/hydration status for malnutrition  Collaborate with interdisciplinary team and initiate plan and interventions as ordered  Monitor patient's weight and dietary intake as ordered or per policy  Utilize nutrition screening tool and intervene as necessary  Determine patient's food preferences and provide high-protein, high-caloric foods as appropriate       INTERVENTIONS:  - Monitor oral intake, urinary output, labs, and treatment plans  - Assess nutrition and hydration status and recommend course of action  - Evaluate amount of meals eaten  - Assist patient with eating if necessary   - Allow adequate time for meals  - Recommend/ encourage appropriate diets, oral nutritional supplements, and vitamin/mineral supplements  - Order, calculate, and assess calorie counts as needed  - Recommend, monitor, and adjust tube feedings and TPN/PPN based on assessed needs  - Assess need for intravenous fluids  - Provide specific nutrition/hydration education as appropriate  - Include patient/family/caregiver in decisions related to nutrition  Outcome: Progressing     Problem: Prexisting or High Potential for Compromised Skin Integrity  Goal: Skin integrity is maintained or improved  Description: INTERVENTIONS:  - Identify patients at risk for skin breakdown  - Assess and monitor skin integrity  - Assess and monitor nutrition and hydration status  - Monitor labs   - Assess for incontinence   - Turn and reposition patient  - Assist with mobility/ambulation  - Relieve pressure over bony prominences  - Avoid friction and shearing  - Provide appropriate hygiene as needed including keeping skin clean and dry  - Evaluate need for skin moisturizer/barrier cream  - Collaborate with interdisciplinary team   - Patient/family teaching  - Consider wound care consult   Outcome: Progressing     Problem: MOBILITY - ADULT  Goal: Maintain or return to baseline ADL function  Description: INTERVENTIONS:  -  Assess patient's ability to carry out ADLs; assess patient's baseline for ADL function and identify physical deficits which impact ability to perform ADLs (bathing, care of mouth/teeth, toileting, grooming, dressing, etc )  - Assess/evaluate cause of self-care deficits   - Assess range of motion  - Assess patient's mobility; develop plan if impaired  - Assess patient's need for assistive devices and provide as appropriate  - Encourage maximum independence but intervene and supervise when necessary  - Involve family in performance of ADLs  - Assess for home care needs following discharge   - Consider OT consult to assist with ADL evaluation and planning for discharge  - Provide patient education as appropriate  Outcome: Progressing  Goal: Maintains/Returns to pre admission functional level  Description: INTERVENTIONS:  - Perform BMAT or MOVE assessment daily    - Set and communicate daily mobility goal to care team and patient/family/caregiver     - Collaborate with rehabilitation services on mobility goals if consulted  - - Out of bed for toileting  - Record patient progress and toleration of activity level   Outcome: Progressing     Problem: PAIN - ADULT  Goal: Verbalizes/displays adequate comfort level or baseline comfort level  Description: Interventions:  - Encourage patient to monitor pain and request assistance  - Assess pain using appropriate pain scale  - Administer analgesics based on type and severity of pain and evaluate response  - Implement non-pharmacological measures as appropriate and evaluate response  - Consider cultural and social influences on pain and pain management  - Notify physician/advanced practitioner if interventions unsuccessful or patient reports new pain  Outcome: Progressing     Problem: INFECTION - ADULT  Goal: Absence or prevention of progression during hospitalization  Description: INTERVENTIONS:  - Assess and monitor for signs and symptoms of infection  - Monitor lab/diagnostic results  - Monitor all insertion sites, i e  indwelling lines, tubes, and drains  - Monitor endotracheal if appropriate and nasal secretions for changes in amount and color  - [place] appropriate cooling/warming therapies per order  - Administer medications as ordered  - Instruct and encourage patient and family to use good hand hygiene technique  - Identify and instruct in appropriate isolation precautions for identified infection/condition  Outcome: Progressing  Goal: Absence of fever/infection during neutropenic period  Description: INTERVENTIONS:  - Monitor WBC    Outcome: Progressing     Problem: SAFETY ADULT  Goal: Maintain or return to baseline ADL function  Description: INTERVENTIONS:  -  Assess patient's ability to carry out ADLs; assess patient's baseline for ADL function and identify physical deficits which impact ability to perform ADLs (bathing, care of mouth/teeth, toileting, grooming, dressing, etc )  - Assess/evaluate cause of self-care deficits   - Assess range of motion  - Assess patient's mobility; develop plan if impaired  - Assess patient's need for assistive devices and provide as appropriate  - Encourage maximum independence but intervene and supervise when necessary  - Involve family in performance of ADLs  - Assess for home care needs following discharge   - Consider OT consult to assist with ADL evaluation and planning for discharge  - Provide patient education as appropriate  Outcome: Progressing  Goal: Maintains/Returns to pre admission functional level  Description: INTERVENTIONS:  - Perform BMAT or MOVE assessment daily    - Set and communicate daily mobility goal to care team and patient/family/caregiver     - Collaborate with rehabilitation services on mobility goals if consulted  - Out of bed for toileting  - Record patient progress and toleration of activity level   Outcome: Progressing  Goal: Patient will remain free of falls  Description: INTERVENTIONS:  - Educate patient/family on patient safety including physical limitations  - Instruct patient to call for assistance with activity   - Consult OT/PT to assist with strengthening/mobility   - Keep Call bell within reach  - Keep bed low and locked with side rails adjusted as appropriate  - Keep care items and personal belongings within reach  - Initiate and maintain comfort rounds  - Make Fall Risk Sign visible to staff  - Apply yellow socks and bracelet for high fall risk patients  - Consider moving patient to room near nurses station  Outcome: Progressing     Problem: GENITOURINARY - ADULT  Goal: Maintains or returns to baseline urinary function  Description: INTERVENTIONS:  - Assess urinary function  - Encourage oral fluids to ensure adequate hydration if ordered  - Administer IV fluids as ordered to ensure adequate hydration  - Administer ordered medications as needed  - Offer frequent toileting  - Follow urinary retention protocol if ordered  Outcome: Progressing  Goal: Absence of urinary retention  Description: INTERVENTIONS:  - Assess patient’s ability to void and empty bladder  - Monitor I/O  - Bladder scan as needed  - Discuss with physician/AP medications to alleviate retention as needed  - Discuss catheterization for long term situations as appropriate  Outcome: Progressing  Goal: Urinary catheter remains patent  Description: INTERVENTIONS:  - Assess patency of urinary catheter  - If patient has a chronic wagner, consider changing catheter if non-functioning  - Follow guidelines for intermittent irrigation of non-functioning urinary catheter  Outcome: Progressing     Problem: HEMATOLOGIC - ADULT  Goal: Maintains hematologic stability  Description: INTERVENTIONS  - Assess for signs and symptoms of bleeding or hemorrhage  - Monitor labs  - Administer supportive blood products/factors as ordered and appropriate  Outcome: Progressing     Problem: MUSCULOSKELETAL - ADULT  Goal: Maintain or return mobility to safest level of function  Description: INTERVENTIONS:  - Assess patient's ability to carry out ADLs; assess patient's baseline for ADL function and identify physical deficits which impact ability to perform ADLs (bathing, care of mouth/teeth, toileting, grooming, dressing, etc )  - Assess/evaluate cause of self-care deficits   - Assess range of motion  - Assess patient's mobility  - Assess patient's need for assistive devices and provide as appropriate  - Encourage maximum independence but intervene and supervise when necessary  - Involve family in performance of ADLs  - Assess for home care needs following discharge   - Consider OT consult to assist with ADL evaluation and planning for discharge  - Provide patient education as appropriate  Outcome: Progressing  Goal: Maintain proper alignment of affected body part  Description: INTERVENTIONS:  - Support, maintain and protect limb and body alignment  - Provide patient/ family with appropriate education  Outcome: Progressing

## 2023-01-14 NOTE — ASSESSMENT & PLAN NOTE
· Patient was transferred from [address] [place] [address] due to fever and positive blood cultures  · Blood culture growing Enterobacter  · Port-A-Cath removed by interventional radiology  · Repeat blood cultures no growth at 72 hours  · Spoke with ID  They are changing Cefepime to oral levaquin    Patient can be discharged once we have rehab placement    Loop recorder is not in blood stream and is not thought to be infected

## 2023-01-14 NOTE — PLAN OF CARE
Problem: Nutrition/Hydration-ADULT  Goal: Nutrient/Hydration intake appropriate for improving, restoring or maintaining nutritional needs  Description: Monitor and assess patient's nutrition/hydration status for malnutrition  Collaborate with interdisciplinary team and initiate plan and interventions as ordered  Monitor patient's weight and dietary intake as ordered or per policy  Utilize nutrition screening tool and intervene as necessary  Determine patient's food preferences and provide high-protein, high-caloric foods as appropriate       INTERVENTIONS:  - Monitor oral intake, urinary output, labs, and treatment plans  - Assess nutrition and hydration status and recommend course of action  - Evaluate amount of meals eaten  - Assist patient with eating if necessary   - Allow adequate time for meals  - Recommend/ encourage appropriate diets, oral nutritional supplements, and vitamin/mineral supplements  - Order, calculate, and assess calorie counts as needed  - Recommend, monitor, and adjust tube feedings and TPN/PPN based on assessed needs  - Assess need for intravenous fluids  - Provide specific nutrition/hydration education as appropriate  - Include patient/family/caregiver in decisions related to nutrition  Outcome: Progressing     Problem: Prexisting or High Potential for Compromised Skin Integrity  Goal: Skin integrity is maintained or improved  Description: INTERVENTIONS:  - Identify patients at risk for skin breakdown  - Assess and monitor skin integrity  - Assess and monitor nutrition and hydration status  - Monitor labs   - Assess for incontinence   - Turn and reposition patient  - Assist with mobility/ambulation  - Relieve pressure over bony prominences  - Avoid friction and shearing  - Provide appropriate hygiene as needed including keeping skin clean and dry  - Evaluate need for skin moisturizer/barrier cream  - Collaborate with interdisciplinary team   - Patient/family teaching  - Consider wound care consult   Outcome: Progressing     Problem: MOBILITY - ADULT  Goal: Maintain or return to baseline ADL function  Description: INTERVENTIONS:  -  Assess patient's ability to carry out ADLs; assess patient's baseline for ADL function and identify physical deficits which impact ability to perform ADLs (bathing, care of mouth/teeth, toileting, grooming, dressing, etc )  - Assess/evaluate cause of self-care deficits   - Assess range of motion  - Assess patient's mobility; develop plan if impaired  - Assess patient's need for assistive devices and provide as appropriate  - Encourage maximum independence but intervene and supervise when necessary  - Involve family in performance of ADLs  - Assess for home care needs following discharge   - Consider OT consult to assist with ADL evaluation and planning for discharge  - Provide patient education as appropriate  Outcome: Progressing  Goal: Maintains/Returns to pre admission functional level  Description: INTERVENTIONS:  - Perform BMAT or MOVE assessment daily    - Set and communicate daily mobility goal to care team and patient/family/caregiver  - Collaborate with rehabilitation services on mobility goals if consulted  - Perform Range of Motion 3 times a day  - Reposition patient every 2 hours    - Dangle patient 3 times a day  - Stand patient 3 times a day  - Ambulate patient 3 times a day  - Out of bed to chair 3 times a day   - Out of bed for meals 3 times a day  - Out of bed for toileting  - Record patient progress and toleration of activity level   Outcome: Progressing     Problem: Potential for Falls  Goal: Patient will remain free of falls  Description: INTERVENTIONS:  - Educate patient/family on patient safety including physical limitations  - Instruct patient to call for assistance with activity   - Consult OT/PT to assist with strengthening/mobility   - Keep Call bell within reach  - Keep bed low and locked with side rails adjusted as appropriate  - Keep care items and personal belongings within reach  - Initiate and maintain comfort rounds  - Make Fall Risk Sign visible to staff  - Offer Toileting every 2 Hours, in advance of need  - Initiate/Maintain bed alarm  - Obtain necessary fall risk management equipment: bed alarm   - Apply yellow socks and bracelet for high fall risk patients  - Consider moving patient to room near nurses station  Outcome: Progressing     Problem: PAIN - ADULT  Goal: Verbalizes/displays adequate comfort level or baseline comfort level  Description: Interventions:  - Encourage patient to monitor pain and request assistance  - Assess pain using appropriate pain scale  - Administer analgesics based on type and severity of pain and evaluate response  - Implement non-pharmacological measures as appropriate and evaluate response  - Consider cultural and social influences on pain and pain management  - Notify physician/advanced practitioner if interventions unsuccessful or patient reports new pain  Outcome: Progressing     Problem: INFECTION - ADULT  Goal: Absence or prevention of progression during hospitalization  Description: INTERVENTIONS:  - Assess and monitor for signs and symptoms of infection  - Monitor lab/diagnostic results  - Monitor all insertion sites, i e  indwelling lines, tubes, and drains  - Monitor endotracheal if appropriate and nasal secretions for changes in amount and color  - [place] appropriate cooling/warming therapies per order  - Administer medications as ordered  - Instruct and encourage patient and family to use good hand hygiene technique  - Identify and instruct in appropriate isolation precautions for identified infection/condition  Outcome: Progressing  Goal: Absence of fever/infection during neutropenic period  Description: INTERVENTIONS:  - Monitor WBC    Outcome: Progressing     Problem: SAFETY ADULT  Goal: Maintain or return to baseline ADL function  Description: INTERVENTIONS:  -  Assess patient's ability to carry out ADLs; assess patient's baseline for ADL function and identify physical deficits which impact ability to perform ADLs (bathing, care of mouth/teeth, toileting, grooming, dressing, etc )  - Assess/evaluate cause of self-care deficits   - Assess range of motion  - Assess patient's mobility; develop plan if impaired  - Assess patient's need for assistive devices and provide as appropriate  - Encourage maximum independence but intervene and supervise when necessary  - Involve family in performance of ADLs  - Assess for home care needs following discharge   - Consider OT consult to assist with ADL evaluation and planning for discharge  - Provide patient education as appropriate  Outcome: Progressing  Goal: Maintains/Returns to pre admission functional level  Description: INTERVENTIONS:  - Perform BMAT or MOVE assessment daily    - Set and communicate daily mobility goal to care team and patient/family/caregiver  - Collaborate with rehabilitation services on mobility goals if consulted  - Perform Range of Motion 3 times a day  - Reposition patient every 2 hours    - Dangle patient 3 times a day  - Stand patient 3 times a day  - Ambulate patient 3 times a day  - Out of bed to chair 3 times a day   - Out of bed for meals 3 times a day  - Out of bed for toileting  - Record patient progress and toleration of activity level   Outcome: Progressing  Goal: Patient will remain free of falls  Description: INTERVENTIONS:  - Educate patient/family on patient safety including physical limitations  - Instruct patient to call for assistance with activity   - Consult OT/PT to assist with strengthening/mobility   - Keep Call bell within reach  - Keep bed low and locked with side rails adjusted as appropriate  - Keep care items and personal belongings within reach  - Initiate and maintain comfort rounds  - Make Fall Risk Sign visible to staff  - Offer Toileting every 2 Hours, in advance of need  - Initiate/Maintain bed alarm  - Obtain necessary fall risk management equipment: bed alarm   - Apply yellow socks and bracelet for high fall risk patients  - Consider moving patient to room near nurses station  Outcome: Progressing     Problem: GENITOURINARY - ADULT  Goal: Maintains or returns to baseline urinary function  Description: INTERVENTIONS:  - Assess urinary function  - Encourage oral fluids to ensure adequate hydration if ordered  - Administer IV fluids as ordered to ensure adequate hydration  - Administer ordered medications as needed  - Offer frequent toileting  - Follow urinary retention protocol if ordered  Outcome: Progressing  Goal: Absence of urinary retention  Description: INTERVENTIONS:  - Assess patient’s ability to void and empty bladder  - Monitor I/O  - Bladder scan as needed  - Discuss with physician/AP medications to alleviate retention as needed  - Discuss catheterization for long term situations as appropriate  Outcome: Progressing  Goal: Urinary catheter remains patent  Description: INTERVENTIONS:  - Assess patency of urinary catheter  - If patient has a chronic wagner, consider changing catheter if non-functioning  - Follow guidelines for intermittent irrigation of non-functioning urinary catheter  Outcome: Progressing     Problem: HEMATOLOGIC - ADULT  Goal: Maintains hematologic stability  Description: INTERVENTIONS  - Assess for signs and symptoms of bleeding or hemorrhage  - Monitor labs  - Administer supportive blood products/factors as ordered and appropriate  Outcome: Progressing     Problem: MUSCULOSKELETAL - ADULT  Goal: Maintain or return mobility to safest level of function  Description: INTERVENTIONS:  - Assess patient's ability to carry out ADLs; assess patient's baseline for ADL function and identify physical deficits which impact ability to perform ADLs (bathing, care of mouth/teeth, toileting, grooming, dressing, etc )  - Assess/evaluate cause of self-care deficits   - Assess range of motion  - Assess patient's mobility  - Assess patient's need for assistive devices and provide as appropriate  - Encourage maximum independence but intervene and supervise when necessary  - Involve family in performance of ADLs  - Assess for home care needs following discharge   - Consider OT consult to assist with ADL evaluation and planning for discharge  - Provide patient education as appropriate  Outcome: Progressing  Goal: Maintain proper alignment of affected body part  Description: INTERVENTIONS:  - Support, maintain and protect limb and body alignment  - Provide patient/ family with appropriate education  Outcome: Progressing

## 2023-01-15 LAB
ABO GROUP BLD: NORMAL
BLD GP AB SCN SERPL QL: NEGATIVE
ERYTHROCYTE [DISTWIDTH] IN BLOOD BY AUTOMATED COUNT: 15.7 % (ref 11.6–15.1)
GLUCOSE SERPL-MCNC: 106 MG/DL (ref 65–140)
GLUCOSE SERPL-MCNC: 137 MG/DL (ref 65–140)
GLUCOSE SERPL-MCNC: 246 MG/DL (ref 65–140)
GLUCOSE SERPL-MCNC: 264 MG/DL (ref 65–140)
HCT VFR BLD AUTO: 24.4 % (ref 36.5–49.3)
HGB BLD-MCNC: 7.7 G/DL (ref 12–17)
MCH RBC QN AUTO: 30 PG (ref 26.8–34.3)
MCHC RBC AUTO-ENTMCNC: 31.6 G/DL (ref 31.4–37.4)
MCV RBC AUTO: 95 FL (ref 82–98)
PLATELET # BLD AUTO: 228 THOUSANDS/UL (ref 149–390)
PMV BLD AUTO: 9.6 FL (ref 8.9–12.7)
RBC # BLD AUTO: 2.57 MILLION/UL (ref 3.88–5.62)
RH BLD: POSITIVE
SPECIMEN EXPIRATION DATE: NORMAL
WBC # BLD AUTO: 12.87 THOUSAND/UL (ref 4.31–10.16)

## 2023-01-15 RX ADMIN — INSULIN GLARGINE 15 UNITS: 100 INJECTION, SOLUTION SUBCUTANEOUS at 22:55

## 2023-01-15 RX ADMIN — ARIPIPRAZOLE 2 MG: 2 TABLET ORAL at 08:31

## 2023-01-15 RX ADMIN — METOPROLOL TARTRATE 25 MG: 25 TABLET, FILM COATED ORAL at 22:54

## 2023-01-15 RX ADMIN — OXYBUTYNIN CHLORIDE 5 MG: 5 TABLET ORAL at 22:55

## 2023-01-15 RX ADMIN — ACETAMINOPHEN 975 MG: 325 TABLET, FILM COATED ORAL at 22:55

## 2023-01-15 RX ADMIN — B-COMPLEX W/ C & FOLIC ACID TAB 1 TABLET: TAB at 08:31

## 2023-01-15 RX ADMIN — BIMATOPROST 1 DROP: 0.1 SOLUTION/ DROPS OPHTHALMIC at 22:55

## 2023-01-15 RX ADMIN — GABAPENTIN 300 MG: 300 CAPSULE ORAL at 22:55

## 2023-01-15 RX ADMIN — OXYBUTYNIN CHLORIDE 5 MG: 5 TABLET ORAL at 16:40

## 2023-01-15 RX ADMIN — CALCITRIOL 0.25 MCG: 0.25 CAPSULE, LIQUID FILLED ORAL at 08:31

## 2023-01-15 RX ADMIN — SENNOSIDES 17.2 MG: 8.6 TABLET, FILM COATED ORAL at 05:06

## 2023-01-15 RX ADMIN — ACETAMINOPHEN 975 MG: 325 TABLET, FILM COATED ORAL at 05:06

## 2023-01-15 RX ADMIN — OXYCODONE HYDROCHLORIDE 10 MG: 10 TABLET ORAL at 16:40

## 2023-01-15 RX ADMIN — OXYBUTYNIN CHLORIDE 5 MG: 5 TABLET ORAL at 08:31

## 2023-01-15 RX ADMIN — EZETIMIBE 10 MG: 10 TABLET ORAL at 08:31

## 2023-01-15 RX ADMIN — CYANOCOBALAMIN TAB 500 MCG 1000 MCG: 500 TAB at 08:31

## 2023-01-15 RX ADMIN — OXYCODONE HYDROCHLORIDE 10 MG: 10 TABLET ORAL at 06:18

## 2023-01-15 RX ADMIN — PANTOPRAZOLE SODIUM 20 MG: 20 TABLET, DELAYED RELEASE ORAL at 05:06

## 2023-01-15 NOTE — PLAN OF CARE
Problem: Nutrition/Hydration-ADULT  Goal: Nutrient/Hydration intake appropriate for improving, restoring or maintaining nutritional needs  Description: Monitor and assess patient's nutrition/hydration status for malnutrition  Collaborate with interdisciplinary team and initiate plan and interventions as ordered  Monitor patient's weight and dietary intake as ordered or per policy  Utilize nutrition screening tool and intervene as necessary  Determine patient's food preferences and provide high-protein, high-caloric foods as appropriate       INTERVENTIONS:  - Monitor oral intake, urinary output, labs, and treatment plans  - Assess nutrition and hydration status and recommend course of action  - Evaluate amount of meals eaten  - Assist patient with eating if necessary   - Allow adequate time for meals  - Recommend/ encourage appropriate diets, oral nutritional supplements, and vitamin/mineral supplements  - Order, calculate, and assess calorie counts as needed  - Recommend, monitor, and adjust tube feedings and TPN/PPN based on assessed needs  - Assess need for intravenous fluids  - Provide specific nutrition/hydration education as appropriate  - Include patient/family/caregiver in decisions related to nutrition  Outcome: Progressing     Problem: Prexisting or High Potential for Compromised Skin Integrity  Goal: Skin integrity is maintained or improved  Description: INTERVENTIONS:  - Identify patients at risk for skin breakdown  - Assess and monitor skin integrity  - Assess and monitor nutrition and hydration status  - Monitor labs   - Assess for incontinence   - Turn and reposition patient  - Assist with mobility/ambulation  - Relieve pressure over bony prominences  - Avoid friction and shearing  - Provide appropriate hygiene as needed including keeping skin clean and dry  - Evaluate need for skin moisturizer/barrier cream  - Collaborate with interdisciplinary team   - Patient/family teaching  - Consider wound care consult   Outcome: Progressing     Problem: MOBILITY - ADULT  Goal: Maintain or return to baseline ADL function  Description: INTERVENTIONS:  -  Assess patient's ability to carry out ADLs; assess patient's baseline for ADL function and identify physical deficits which impact ability to perform ADLs (bathing, care of mouth/teeth, toileting, grooming, dressing, etc )  - Assess/evaluate cause of self-care deficits   - Assess range of motion  - Assess patient's mobility; develop plan if impaired  - Assess patient's need for assistive devices and provide as appropriate  - Encourage maximum independence but intervene and supervise when necessary  - Involve family in performance of ADLs  - Assess for home care needs following discharge   - Consider OT consult to assist with ADL evaluation and planning for discharge  - Provide patient education as appropriate  Outcome: Progressing  Goal: Maintains/Returns to pre admission functional level  Description: INTERVENTIONS:  - Perform BMAT or MOVE assessment daily    - Set and communicate daily mobility goal to care team and patient/family/caregiver  - Collaborate with rehabilitation services on mobility goals if consulted  - Perform Range of Motion 3 times a day  - Reposition patient every 2 hours    - Dangle patient 3 times a day  - Stand patient 3 times a day  - Ambulate patient 3 times a day  - Out of bed to chair 3 times a day   - Out of bed for meals 3 times a day  - Out of bed for toileting  - Record patient progress and toleration of activity level   Outcome: Progressing     Problem: Potential for Falls  Goal: Patient will remain free of falls  Description: INTERVENTIONS:  - Educate patient/family on patient safety including physical limitations  - Instruct patient to call for assistance with activity   - Consult OT/PT to assist with strengthening/mobility   - Keep Call bell within reach  - Keep bed low and locked with side rails adjusted as appropriate  - Keep care items and personal belongings within reach  - Initiate and maintain comfort rounds  - Make Fall Risk Sign visible to staff  - Offer Toileting every 2 Hours, in advance of need  - Initiate/Maintain bed alarm  - Obtain necessary fall risk management equipment: bed alarm  - Apply yellow socks and bracelet for high fall risk patients  - Consider moving patient to room near nurses station  Outcome: Progressing     Problem: PAIN - ADULT  Goal: Verbalizes/displays adequate comfort level or baseline comfort level  Description: Interventions:  - Encourage patient to monitor pain and request assistance  - Assess pain using appropriate pain scale  - Administer analgesics based on type and severity of pain and evaluate response  - Implement non-pharmacological measures as appropriate and evaluate response  - Consider cultural and social influences on pain and pain management  - Notify physician/advanced practitioner if interventions unsuccessful or patient reports new pain  Outcome: Progressing     Problem: INFECTION - ADULT  Goal: Absence or prevention of progression during hospitalization  Description: INTERVENTIONS:  - Assess and monitor for signs and symptoms of infection  - Monitor lab/diagnostic results  - Monitor all insertion sites, i e  indwelling lines, tubes, and drains  - Monitor endotracheal if appropriate and nasal secretions for changes in amount and color  - [place] appropriate cooling/warming therapies per order  - Administer medications as ordered  - Instruct and encourage patient and family to use good hand hygiene technique  - Identify and instruct in appropriate isolation precautions for identified infection/condition  Outcome: Progressing  Goal: Absence of fever/infection during neutropenic period  Description: INTERVENTIONS:  - Monitor WBC    Outcome: Progressing     Problem: SAFETY ADULT  Goal: Maintain or return to baseline ADL function  Description: INTERVENTIONS:  -  Assess patient's ability to carry out ADLs; assess patient's baseline for ADL function and identify physical deficits which impact ability to perform ADLs (bathing, care of mouth/teeth, toileting, grooming, dressing, etc )  - Assess/evaluate cause of self-care deficits   - Assess range of motion  - Assess patient's mobility; develop plan if impaired  - Assess patient's need for assistive devices and provide as appropriate  - Encourage maximum independence but intervene and supervise when necessary  - Involve family in performance of ADLs  - Assess for home care needs following discharge   - Consider OT consult to assist with ADL evaluation and planning for discharge  - Provide patient education as appropriate  Outcome: Progressing  Goal: Maintains/Returns to pre admission functional level  Description: INTERVENTIONS:  - Perform BMAT or MOVE assessment daily    - Set and communicate daily mobility goal to care team and patient/family/caregiver  - Collaborate with rehabilitation services on mobility goals if consulted  - Perform Range of Motion 3 times a day  - Reposition patient every 2 hours    - Dangle patient 3 times a day  - Stand patient 3 times a day  - Ambulate patient 3 times a day  - Out of bed to chair 3 times a day   - Out of bed for meals 3 times a day  - Out of bed for toileting  - Record patient progress and toleration of activity level   Outcome: Progressing  Goal: Patient will remain free of falls  Description: INTERVENTIONS:  - Educate patient/family on patient safety including physical limitations  - Instruct patient to call for assistance with activity   - Consult OT/PT to assist with strengthening/mobility   - Keep Call bell within reach  - Keep bed low and locked with side rails adjusted as appropriate  - Keep care items and personal belongings within reach  - Initiate and maintain comfort rounds  - Make Fall Risk Sign visible to staff  - Offer Toileting every 2 Hours, in advance of need  - Initiate/Maintain bed alarm  - Obtain necessary fall risk management equipment: bed alarm  - Apply yellow socks and bracelet for high fall risk patients  - Consider moving patient to room near nurses station  Outcome: Progressing     Problem: GENITOURINARY - ADULT  Goal: Maintains or returns to baseline urinary function  Description: INTERVENTIONS:  - Assess urinary function  - Encourage oral fluids to ensure adequate hydration if ordered  - Administer IV fluids as ordered to ensure adequate hydration  - Administer ordered medications as needed  - Offer frequent toileting  - Follow urinary retention protocol if ordered  Outcome: Progressing  Goal: Absence of urinary retention  Description: INTERVENTIONS:  - Assess patient’s ability to void and empty bladder  - Monitor I/O  - Bladder scan as needed  - Discuss with physician/AP medications to alleviate retention as needed  - Discuss catheterization for long term situations as appropriate  Outcome: Progressing  Goal: Urinary catheter remains patent  Description: INTERVENTIONS:  - Assess patency of urinary catheter  - If patient has a chronic wagner, consider changing catheter if non-functioning  - Follow guidelines for intermittent irrigation of non-functioning urinary catheter  Outcome: Progressing     Problem: HEMATOLOGIC - ADULT  Goal: Maintains hematologic stability  Description: INTERVENTIONS  - Assess for signs and symptoms of bleeding or hemorrhage  - Monitor labs  - Administer supportive blood products/factors as ordered and appropriate  Outcome: Progressing     Problem: MUSCULOSKELETAL - ADULT  Goal: Maintain or return mobility to safest level of function  Description: INTERVENTIONS:  - Assess patient's ability to carry out ADLs; assess patient's baseline for ADL function and identify physical deficits which impact ability to perform ADLs (bathing, care of mouth/teeth, toileting, grooming, dressing, etc )  - Assess/evaluate cause of self-care deficits   - Assess range of motion  - Assess patient's mobility  - Assess patient's need for assistive devices and provide as appropriate  - Encourage maximum independence but intervene and supervise when necessary  - Involve family in performance of ADLs  - Assess for home care needs following discharge   - Consider OT consult to assist with ADL evaluation and planning for discharge  - Provide patient education as appropriate  Outcome: Progressing  Goal: Maintain proper alignment of affected body part  Description: INTERVENTIONS:  - Support, maintain and protect limb and body alignment  - Provide patient/ family with appropriate education  Outcome: Progressing

## 2023-01-15 NOTE — ASSESSMENT & PLAN NOTE
· Patient has persistent hematuria due to bladder cancer  History of recurrent UTI on suppressive Bactrim  · CT A/P: Stable bladder wall thickening, compatible with known malignancy  Increased hyperdensity within the bladder lumen, compatible with new blood clot  · Recent admission at [facility] 12/12-12/22 for hematuria and obstructive uropathy in the setting of known bladder cancer s/p BCG and subsequent chemotherapy/radiation s/p bilateral chronic PCN tubes   Noted to have new right-sided hydronephrosis on CT s/p cystoscopy, clot evacuation w/ right ureteral stenting on 12/14 by Dr Jocy Finn  · UA bloody, innumerable RBC/WBC, +leuks, no nitrites    Patient initially was on CBI per urology  Now transition to hand irrigation several times per day  Urology does not feel patient needs anything other than this right now    He unfortunately likely have chronic hematuria    Patient did drop his hemoglobin below 8 today so I will transfuse 1 unit of PRBCs

## 2023-01-15 NOTE — ASSESSMENT & PLAN NOTE
· History of hematuria due to bladder cancer  · Hg 7 1 prior to transfer   Received 1 unit PRBC  · Iron panel showing continued deficiency-unable to utilize IV iron given bacteremia  · Seen in consult by hematology oncology    Hemoglobin 8 2 > 8 1 > 8 0 > 7 9 > 8 >7 7    Will transfuse 1 unit pRBC

## 2023-01-15 NOTE — ASSESSMENT & PLAN NOTE
· Patient was transferred from [address] [place] [address] due to fever and positive blood cultures  · Blood culture growing Enterobacter  · His Port-A-Cath was thought to be the source  · Port-A-Cath removed by interventional radiology  · Repeat blood cultures no growth at 72 hours  · Spoke with ID  They are changing Cefepime to oral levaquin    Patient can be discharged once we have rehab placement    Loop recorder is not in blood stream and is not thought to be infected

## 2023-01-15 NOTE — PLAN OF CARE
Problem: Nutrition/Hydration-ADULT  Goal: Nutrient/Hydration intake appropriate for improving, restoring or maintaining nutritional needs  Description: Monitor and assess patient's nutrition/hydration status for malnutrition  Collaborate with interdisciplinary team and initiate plan and interventions as ordered  Monitor patient's weight and dietary intake as ordered or per policy  Utilize nutrition screening tool and intervene as necessary  Determine patient's food preferences and provide high-protein, high-caloric foods as appropriate       INTERVENTIONS:  - Monitor oral intake, urinary output, labs, and treatment plans  - Assess nutrition and hydration status and recommend course of action  - Evaluate amount of meals eaten  - Assist patient with eating if necessary   - Allow adequate time for meals  - Recommend/ encourage appropriate diets, oral nutritional supplements, and vitamin/mineral supplements  - Order, calculate, and assess calorie counts as needed  - Recommend, monitor, and adjust tube feedings and TPN/PPN based on assessed needs  - Assess need for intravenous fluids  - Provide specific nutrition/hydration education as appropriate  - Include patient/family/caregiver in decisions related to nutrition  Outcome: Progressing     Problem: Prexisting or High Potential for Compromised Skin Integrity  Goal: Skin integrity is maintained or improved  Description: INTERVENTIONS:  - Identify patients at risk for skin breakdown  - Assess and monitor skin integrity  - Assess and monitor nutrition and hydration status  - Monitor labs   - Assess for incontinence   - Turn and reposition patient  - Assist with mobility/ambulation  - Relieve pressure over bony prominences  - Avoid friction and shearing  - Provide appropriate hygiene as needed including keeping skin clean and dry  - Evaluate need for skin moisturizer/barrier cream  - Collaborate with interdisciplinary team   - Patient/family teaching  - Consider wound care consult   Outcome: Progressing     Problem: MOBILITY - ADULT  Goal: Maintain or return to baseline ADL function  Description: INTERVENTIONS:  -  Assess patient's ability to carry out ADLs; assess patient's baseline for ADL function and identify physical deficits which impact ability to perform ADLs (bathing, care of mouth/teeth, toileting, grooming, dressing, etc )  - Assess/evaluate cause of self-care deficits   - Assess range of motion  - Assess patient's mobility; develop plan if impaired  - Assess patient's need for assistive devices and provide as appropriate  - Encourage maximum independence but intervene and supervise when necessary  - Involve family in performance of ADLs  - Assess for home care needs following discharge   - Consider OT consult to assist with ADL evaluation and planning for discharge  - Provide patient education as appropriate  Outcome: Progressing  Goal: Maintains/Returns to pre admission functional level  Description: INTERVENTIONS:  - Perform BMAT or MOVE assessment daily    - Set and communicate daily mobility goal to care team and patient/family/caregiver  - Collaborate with rehabilitation services on mobility goals if consulted  - Perform Range of Motion 3 times a day  - Reposition patient every 2 hours    - Dangle patient 3 times a day  - Stand patient 3 times a day  - Ambulate patient 3 times a day  - Out of bed to chair 3 times a day   - Out of bed for meals 3 times a day  - Out of bed for toileting  - Record patient progress and toleration of activity level   Outcome: Progressing     Problem: Potential for Falls  Goal: Patient will remain free of falls  Description: INTERVENTIONS:  - Educate patient/family on patient safety including physical limitations  - Instruct patient to call for assistance with activity   - Consult OT/PT to assist with strengthening/mobility   - Keep Call bell within reach  - Keep bed low and locked with side rails adjusted as appropriate  - Keep care items and personal belongings within reach  - Initiate and maintain comfort rounds  - Make Fall Risk Sign visible to staff  - Offer Toileting every 2 Hours, in advance of need  - Initiate/Maintain bed alarm  - Apply yellow socks and bracelet for high fall risk patients  - Consider moving patient to room near nurses station  Outcome: Progressing     Problem: PAIN - ADULT  Goal: Verbalizes/displays adequate comfort level or baseline comfort level  Description: Interventions:  - Encourage patient to monitor pain and request assistance  - Assess pain using appropriate pain scale  - Administer analgesics based on type and severity of pain and evaluate response  - Implement non-pharmacological measures as appropriate and evaluate response  - Consider cultural and social influences on pain and pain management  - Notify physician/advanced practitioner if interventions unsuccessful or patient reports new pain  Outcome: Progressing     Problem: INFECTION - ADULT  Goal: Absence or prevention of progression during hospitalization  Description: INTERVENTIONS:  - Assess and monitor for signs and symptoms of infection  - Monitor lab/diagnostic results  - Monitor all insertion sites, i e  indwelling lines, tubes, and drains  - Monitor endotracheal if appropriate and nasal secretions for changes in amount and color  - [place] appropriate cooling/warming therapies per order  - Administer medications as ordered  - Instruct and encourage patient and family to use good hand hygiene technique  - Identify and instruct in appropriate isolation precautions for identified infection/condition  Outcome: Progressing  Goal: Absence of fever/infection during neutropenic period  Description: INTERVENTIONS:  - Monitor WBC    Outcome: Progressing     Problem: SAFETY ADULT  Goal: Maintain or return to baseline ADL function  Description: INTERVENTIONS:  -  Assess patient's ability to carry out ADLs; assess patient's baseline for ADL function and identify physical deficits which impact ability to perform ADLs (bathing, care of mouth/teeth, toileting, grooming, dressing, etc )  - Assess/evaluate cause of self-care deficits   - Assess range of motion  - Assess patient's mobility; develop plan if impaired  - Assess patient's need for assistive devices and provide as appropriate  - Encourage maximum independence but intervene and supervise when necessary  - Involve family in performance of ADLs  - Assess for home care needs following discharge   - Consider OT consult to assist with ADL evaluation and planning for discharge  - Provide patient education as appropriate  Outcome: Progressing  Goal: Maintains/Returns to pre admission functional level  Description: INTERVENTIONS:  - Perform BMAT or MOVE assessment daily    - Set and communicate daily mobility goal to care team and patient/family/caregiver  - Collaborate with rehabilitation services on mobility goals if consulted  - Perform Range of Motion 3 times a day  - Reposition patient every 2 hours    - Dangle patient 3 times a day  - Stand patient 3 times a day  - Ambulate patient 3 times a day  - Out of bed to chair 3 times a day   - Out of bed for meals 3 times a day  - Out of bed for toileting  - Record patient progress and toleration of activity level   Outcome: Progressing  Goal: Patient will remain free of falls  Description: INTERVENTIONS:  - Educate patient/family on patient safety including physical limitations  - Instruct patient to call for assistance with activity   - Consult OT/PT to assist with strengthening/mobility   - Keep Call bell within reach  - Keep bed low and locked with side rails adjusted as appropriate  - Keep care items and personal belongings within reach  - Initiate and maintain comfort rounds  - Make Fall Risk Sign visible to staff  - Offer Toileting every 2 Hours, in advance of need  - Initiate/Maintain bed alarm  - Apply yellow socks and bracelet for high fall risk patients  - Consider moving patient to room near nurses station  Outcome: Progressing     Problem: GENITOURINARY - ADULT  Goal: Maintains or returns to baseline urinary function  Description: INTERVENTIONS:  - Assess urinary function  - Encourage oral fluids to ensure adequate hydration if ordered  - Administer IV fluids as ordered to ensure adequate hydration  - Administer ordered medications as needed  - Offer frequent toileting  - Follow urinary retention protocol if ordered  Outcome: Progressing  Goal: Absence of urinary retention  Description: INTERVENTIONS:  - Assess patient’s ability to void and empty bladder  - Monitor I/O  - Bladder scan as needed  - Discuss with physician/AP medications to alleviate retention as needed  - Discuss catheterization for long term situations as appropriate  Outcome: Progressing  Goal: Urinary catheter remains patent  Description: INTERVENTIONS:  - Assess patency of urinary catheter  - If patient has a chronic wagner, consider changing catheter if non-functioning  - Follow guidelines for intermittent irrigation of non-functioning urinary catheter  Outcome: Progressing     Problem: HEMATOLOGIC - ADULT  Goal: Maintains hematologic stability  Description: INTERVENTIONS  - Assess for signs and symptoms of bleeding or hemorrhage  - Monitor labs  - Administer supportive blood products/factors as ordered and appropriate  Outcome: Progressing     Problem: MUSCULOSKELETAL - ADULT  Goal: Maintain or return mobility to safest level of function  Description: INTERVENTIONS:  - Assess patient's ability to carry out ADLs; assess patient's baseline for ADL function and identify physical deficits which impact ability to perform ADLs (bathing, care of mouth/teeth, toileting, grooming, dressing, etc )  - Assess/evaluate cause of self-care deficits   - Assess range of motion  - Assess patient's mobility  - Assess patient's need for assistive devices and provide as appropriate  - Encourage maximum independence but intervene and supervise when necessary  - Involve family in performance of ADLs  - Assess for home care needs following discharge   - Consider OT consult to assist with ADL evaluation and planning for discharge  - Provide patient education as appropriate  Outcome: Progressing  Goal: Maintain proper alignment of affected body part  Description: INTERVENTIONS:  - Support, maintain and protect limb and body alignment  - Provide patient/ family with appropriate education  Outcome: Progressing

## 2023-01-15 NOTE — PROGRESS NOTES
[facility]  Progress Note - Pascal Spatz 1945, 76 y o  male MRN: [id number]  Unit/Bed#: E5 -01 Encounter: [id number]  Primary Care Provider: Alberto Meadows MD   Date and time admitted to hospital: 1/7/2023 10:17 PM    * Bacteremia due to Enterobacter species  Assessment & Plan  · Patient was transferred from [address] [place] [address] due to fever and positive blood cultures  · Blood culture growing Enterobacter  · His Port-A-Cath was thought to be the source  · Port-A-Cath removed by interventional radiology  · Repeat blood cultures no growth at 72 hours  · Spoke with ID  They are changing Cefepime to oral levaquin    Patient can be discharged once we have rehab placement    Loop recorder is not in blood stream and is not thought to be infected  Gross hematuria  Assessment & Plan  · Patient has persistent hematuria due to bladder cancer  History of recurrent UTI on suppressive Bactrim  · CT A/P: Stable bladder wall thickening, compatible with known malignancy  Increased hyperdensity within the bladder lumen, compatible with new blood clot  · Recent admission at [facility] 12/12-12/22 for hematuria and obstructive uropathy in the setting of known bladder cancer s/p BCG and subsequent chemotherapy/radiation s/p bilateral chronic PCN tubes  Noted to have new right-sided hydronephrosis on CT s/p cystoscopy, clot evacuation w/ right ureteral stenting on 12/14 by Dr Rodolfo Brown  · UA bloody, innumerable RBC/WBC, +leuks, no nitrites    Patient initially was on CBI per urology  Now transition to hand irrigation several times per day  Urology does not feel patient needs anything other than this right now    He unfortunately likely have chronic hematuria    Patient did drop his hemoglobin below 8 today so I will transfuse 1 unit of PRBCs      Acute on chronic blood loss anemia  Assessment & Plan  · History of hematuria due to bladder cancer  · Hg 7 1 prior to transfer   Received 1 unit PRBC  · Iron panel showing continued deficiency-unable to utilize IV iron given bacteremia  · Seen in consult by hematology oncology    Hemoglobin 8 2 > 8 1 > 8 0 > 7 9 > 8 >7 7    Will transfuse 1 unit pRBC          Subjective:   Feels well  No complaints today  No abdominal pain  No pain near port  Objective:     Vitals:   Temp (24hrs), Av 6 °F (37 °C), Min:98 3 °F (36 8 °C), Max:99 °F (37 2 °C)    Temp:  [98 3 °F (36 8 °C)-99 °F (37 2 °C)] 98 3 °F (36 8 °C)  HR:  [] 115  Resp:  [16] 16  BP: ()/(68-93) 144/93  SpO2:  [92 %-99 %] 92 %  Body mass index is 23 99 kg/m²  Input and Output Summary (last 24 hours): Intake/Output Summary (Last 24 hours) at 1/15/2023 1702  Last data filed at 1/15/2023 1401  Gross per 24 hour   Intake --   Output 1750 ml   Net -1750 ml       Physical Exam:     Physical Exam  Vitals and nursing note reviewed  HENT:      Head: Normocephalic and atraumatic  Eyes:      Pupils: Pupils are equal, round, and reactive to light  Cardiovascular:      Rate and Rhythm: Normal rate and regular rhythm  Heart sounds: No murmur heard  No friction rub  No gallop  Pulmonary:      Effort: Pulmonary effort is normal       Breath sounds: Normal breath sounds  No wheezing or rales  Abdominal:      General: Bowel sounds are normal       Palpations: Abdomen is soft  Tenderness: There is no abdominal tenderness  Genitourinary:     Comments: Much less hematuria  Urine is only a mild tinge of blood  Musculoskeletal:      Right lower leg: No edema  Left lower leg: No edema                 Additional Data:     Labs:    Results from last 7 days   Lab Units 01/15/23  0627 23  0510 23  0531   WBC Thousand/uL 12 87*   < > 11 73*   HEMOGLOBIN g/dL 7 7*   < > 7 9*   HEMATOCRIT % 24 4*   < > 25 5*   PLATELETS Thousands/uL 228   < > 224   NEUTROS PCT %  --   --  68   LYMPHS PCT %  --   --  17   MONOS PCT %  --   --  7   EOS PCT %  --   --  7*    < > = values in this interval not displayed  Results from last 7 days   Lab Units 01/14/23  0510 01/13/23  0531 01/12/23  0541   POTASSIUM mmol/L 4 5   < > 4 7   CHLORIDE mmol/L 103   < > 105   CO2 mmol/L 26   < > 26   BUN mg/dL 48*   < > 55*   CREATININE mg/dL 3 19*   < > 3 12*   CALCIUM mg/dL 9 2   < > 9 5   ALK PHOS U/L  --   --  133*   ALT U/L  --   --  23   AST U/L  --   --  28    < > = values in this interval not displayed           Results from last 7 days   Lab Units 01/15/23  1533 01/15/23  1108 01/15/23  0712 01/14/23  2057 01/14/23  1631 01/14/23  1144 01/14/23  0723 01/13/23  2056 01/13/23  1614 01/13/23  1132 01/13/23  0742 01/12/23  2101   POC GLUCOSE mg/dl 264* 106 137 155* 157* 240* 120 262* 223* 199* 196* 191*               * I Have Reviewed All Lab Data     Recent Cultures (last 7 days):             Last 24 Hours Medication List:   Current Facility-Administered Medications   Medication Dose Route Frequency Provider Last Rate   • acetaminophen  975 mg Oral Counts include 234 beds at the [facility] Yola Meyers PA-C     • ALPRAZolam  0 25 mg Oral BID PRN Josselin Vicente, CRNP     • ARIPiprazole  2 mg Oral Daily Josselin Vicente, CRNP     • azelastine  1 spray Each Nare BID PRN Josselin Vicente, CRNP     • bimatoprost  1 drop Both Eyes HS Josselin Vicente, CRNP     • calcitriol  0 25 mcg Oral Daily Josselin Vicente, CRNP     • cyanocobalamin  1,000 mcg Oral Daily Josselin Vicente, CRNP     • ezetimibe  10 mg Oral Daily Josselin Vicente, CRNP     • furosemide  20 mg Oral Daily Josselin Vicente CRNP     • gabapentin  300 mg Oral HS Josselin Vicente, CRNP     • HYDROmorphone  0 2 mg Intravenous 4x Daily PRN Josselin Vicente, CRNP     • insulin glargine  15 Units Subcutaneous HS Lorna Luna PA-C     • levofloxacin  750 mg Oral Every Other Day Nancy Coleman MD     • metoprolol tartrate  25 mg Oral Q12H 3600 EVANGELINA Moody     • multivitamin stress formula  1 tablet Oral Daily EVANGELINA Perez     • ondansetron  4 mg Intravenous Q6H PRN Rushie Cooks, CRNP     • oxybutynin  5 mg Oral TID EVANGELINA Torres     • oxyCODONE  10 mg Oral TID PRN Rushie Cooks, CRNP     • pantoprazole  20 mg Oral Early Morning Rushie Cooks, CRNP     • polyethylene glycol  17 g Oral Daily PRN Rushie Cooks, CRNP     • senna  2 tablet Oral BID PRN Yohan Mcclelland DO     • simethicone  80 mg Oral 4x Daily PRN Rushie Cooks, CRNP           VTE Pharmacologic Prophylaxis:   Pharmacologic: none with hematuira      Current Length of Stay: 8 day(s)    Current Patient Status: Inpatient       Discharge Plan: just waiting on placement  Patient wants to go to [facility]    Code Status: Level 1 - Full Code           Today, Patient Was Seen By: Shell Branham DO    ** Please Note: Dictation voice to text software may have been used in the creation of this document   **

## 2023-01-16 LAB
ABO GROUP BLD BPU: NORMAL
ANION GAP SERPL CALCULATED.3IONS-SCNC: 10 MMOL/L (ref 4–13)
BPU ID: NORMAL
BUN SERPL-MCNC: 49 MG/DL (ref 5–25)
CALCIUM SERPL-MCNC: 9.3 MG/DL (ref 8.3–10.1)
CHLORIDE SERPL-SCNC: 102 MMOL/L (ref 96–108)
CO2 SERPL-SCNC: 26 MMOL/L (ref 21–32)
CREAT SERPL-MCNC: 3.14 MG/DL (ref 0.6–1.3)
CROSSMATCH: NORMAL
ERYTHROCYTE [DISTWIDTH] IN BLOOD BY AUTOMATED COUNT: 17.6 % (ref 11.6–15.1)
GFR SERPL CREATININE-BSD FRML MDRD: 17 ML/MIN/1.73SQ M
GLUCOSE SERPL-MCNC: 128 MG/DL (ref 65–140)
GLUCOSE SERPL-MCNC: 130 MG/DL (ref 65–140)
GLUCOSE SERPL-MCNC: 139 MG/DL (ref 65–140)
GLUCOSE SERPL-MCNC: 186 MG/DL (ref 65–140)
GLUCOSE SERPL-MCNC: 203 MG/DL (ref 65–140)
HCT VFR BLD AUTO: 27.5 % (ref 36.5–49.3)
HGB BLD-MCNC: 8.8 G/DL (ref 12–17)
MCH RBC QN AUTO: 29.1 PG (ref 26.8–34.3)
MCHC RBC AUTO-ENTMCNC: 32 G/DL (ref 31.4–37.4)
MCV RBC AUTO: 91 FL (ref 82–98)
PLATELET # BLD AUTO: 253 THOUSANDS/UL (ref 149–390)
PMV BLD AUTO: 9.2 FL (ref 8.9–12.7)
POTASSIUM SERPL-SCNC: 4.7 MMOL/L (ref 3.5–5.3)
RBC # BLD AUTO: 3.02 MILLION/UL (ref 3.88–5.62)
SODIUM SERPL-SCNC: 138 MMOL/L (ref 135–147)
UNIT DISPENSE STATUS: NORMAL
UNIT PRODUCT CODE: NORMAL
UNIT PRODUCT VOLUME: 350 ML
UNIT RH: NORMAL
WBC # BLD AUTO: 13.09 THOUSAND/UL (ref 4.31–10.16)

## 2023-01-16 RX ADMIN — AZELASTINE HYDROCHLORIDE 1 SPRAY: 137 SPRAY, METERED NASAL at 21:24

## 2023-01-16 RX ADMIN — LEVOFLOXACIN 750 MG: 750 TABLET, FILM COATED ORAL at 10:00

## 2023-01-16 RX ADMIN — ACETAMINOPHEN 975 MG: 325 TABLET, FILM COATED ORAL at 21:24

## 2023-01-16 RX ADMIN — HYDROMORPHONE HYDROCHLORIDE 0.2 MG: 0.2 INJECTION, SOLUTION INTRAMUSCULAR; INTRAVENOUS; SUBCUTANEOUS at 15:23

## 2023-01-16 RX ADMIN — ACETAMINOPHEN 975 MG: 325 TABLET, FILM COATED ORAL at 13:33

## 2023-01-16 RX ADMIN — OXYCODONE HYDROCHLORIDE 10 MG: 10 TABLET ORAL at 03:17

## 2023-01-16 RX ADMIN — BIMATOPROST 1 DROP: 0.1 SOLUTION/ DROPS OPHTHALMIC at 21:28

## 2023-01-16 RX ADMIN — OXYBUTYNIN CHLORIDE 5 MG: 5 TABLET ORAL at 10:00

## 2023-01-16 RX ADMIN — METOPROLOL TARTRATE 25 MG: 25 TABLET, FILM COATED ORAL at 10:02

## 2023-01-16 RX ADMIN — CYANOCOBALAMIN TAB 500 MCG 1000 MCG: 500 TAB at 10:00

## 2023-01-16 RX ADMIN — FUROSEMIDE 20 MG: 20 TABLET ORAL at 10:02

## 2023-01-16 RX ADMIN — OXYCODONE HYDROCHLORIDE 10 MG: 10 TABLET ORAL at 13:33

## 2023-01-16 RX ADMIN — EZETIMIBE 10 MG: 10 TABLET ORAL at 10:00

## 2023-01-16 RX ADMIN — CALCITRIOL 0.25 MCG: 0.25 CAPSULE, LIQUID FILLED ORAL at 10:00

## 2023-01-16 RX ADMIN — GABAPENTIN 300 MG: 300 CAPSULE ORAL at 21:24

## 2023-01-16 RX ADMIN — OXYBUTYNIN CHLORIDE 5 MG: 5 TABLET ORAL at 15:19

## 2023-01-16 RX ADMIN — PANTOPRAZOLE SODIUM 20 MG: 20 TABLET, DELAYED RELEASE ORAL at 05:57

## 2023-01-16 RX ADMIN — INSULIN GLARGINE 15 UNITS: 100 INJECTION, SOLUTION SUBCUTANEOUS at 21:24

## 2023-01-16 RX ADMIN — OXYBUTYNIN CHLORIDE 5 MG: 5 TABLET ORAL at 21:24

## 2023-01-16 RX ADMIN — ARIPIPRAZOLE 2 MG: 2 TABLET ORAL at 10:00

## 2023-01-16 RX ADMIN — B-COMPLEX W/ C & FOLIC ACID TAB 1 TABLET: TAB at 10:00

## 2023-01-16 RX ADMIN — ACETAMINOPHEN 975 MG: 325 TABLET, FILM COATED ORAL at 05:57

## 2023-01-16 RX ADMIN — METOPROLOL TARTRATE 25 MG: 25 TABLET, FILM COATED ORAL at 21:28

## 2023-01-16 RX ADMIN — OXYCODONE HYDROCHLORIDE 10 MG: 10 TABLET ORAL at 19:41

## 2023-01-16 NOTE — PLAN OF CARE
Problem: Nutrition/Hydration-ADULT  Goal: Nutrient/Hydration intake appropriate for improving, restoring or maintaining nutritional needs  Description: Monitor and assess patient's nutrition/hydration status for malnutrition  Collaborate with interdisciplinary team and initiate plan and interventions as ordered  Monitor patient's weight and dietary intake as ordered or per policy  Utilize nutrition screening tool and intervene as necessary  Determine patient's food preferences and provide high-protein, high-caloric foods as appropriate       INTERVENTIONS:  - Monitor oral intake, urinary output, labs, and treatment plans  - Assess nutrition and hydration status and recommend course of action  - Evaluate amount of meals eaten  - Assist patient with eating if necessary   - Allow adequate time for meals  - Recommend/ encourage appropriate diets, oral nutritional supplements, and vitamin/mineral supplements  - Order, calculate, and assess calorie counts as needed  - Recommend, monitor, and adjust tube feedings and TPN/PPN based on assessed needs  - Assess need for intravenous fluids  - Provide specific nutrition/hydration education as appropriate  - Include patient/family/caregiver in decisions related to nutrition  Outcome: Progressing     Problem: Prexisting or High Potential for Compromised Skin Integrity  Goal: Skin integrity is maintained or improved  Description: INTERVENTIONS:  - Identify patients at risk for skin breakdown  - Assess and monitor skin integrity  - Assess and monitor nutrition and hydration status  - Monitor labs   - Assess for incontinence   - Turn and reposition patient  - Assist with mobility/ambulation  - Relieve pressure over bony prominences  - Avoid friction and shearing  - Provide appropriate hygiene as needed including keeping skin clean and dry  - Evaluate need for skin moisturizer/barrier cream  - Collaborate with interdisciplinary team   - Patient/family teaching  - Consider wound care consult   Outcome: Progressing     Problem: MOBILITY - ADULT  Goal: Maintain or return to baseline ADL function  Description: INTERVENTIONS:  -  Assess patient's ability to carry out ADLs; assess patient's baseline for ADL function and identify physical deficits which impact ability to perform ADLs (bathing, care of mouth/teeth, toileting, grooming, dressing, etc )  - Assess/evaluate cause of self-care deficits   - Assess range of motion  - Assess patient's mobility; develop plan if impaired  - Assess patient's need for assistive devices and provide as appropriate  - Encourage maximum independence but intervene and supervise when necessary  - Involve family in performance of ADLs  - Assess for home care needs following discharge   - Consider OT consult to assist with ADL evaluation and planning for discharge  - Provide patient education as appropriate  Outcome: Progressing  Goal: Maintains/Returns to pre admission functional level  Description: INTERVENTIONS:  - Perform BMAT or MOVE assessment daily    - Set and communicate daily mobility goal to care team and patient/family/caregiver  - Collaborate with rehabilitation services on mobility goals if consulted  - Perform Range of Motion 3 times a day  - Reposition patient every 2 hours    - Dangle patient 3 times a day  - Stand patient 3 times a day  - Ambulate patient 3 times a day  - Out of bed to chair 3 times a day   - Out of bed for meals 3 times a day  - Out of bed for toileting  - Record patient progress and toleration of activity level   Outcome: Progressing     Problem: Potential for Falls  Goal: Patient will remain free of falls  Description: INTERVENTIONS:  - Educate patient/family on patient safety including physical limitations  - Instruct patient to call for assistance with activity   - Consult OT/PT to assist with strengthening/mobility   - Keep Call bell within reach  - Keep bed low and locked with side rails adjusted as appropriate  - Keep care items and personal belongings within reach  - Initiate and maintain comfort rounds  - Make Fall Risk Sign visible to staff  - Offer Toileting every 2 Hours, in advance of need  - Initiate/Maintain bed alarm  - Obtain necessary fall risk management equipment: bed alarm  - Apply yellow socks and bracelet for high fall risk patients  - Consider moving patient to room near nurses station  Outcome: Progressing     Problem: PAIN - ADULT  Goal: Verbalizes/displays adequate comfort level or baseline comfort level  Description: Interventions:  - Encourage patient to monitor pain and request assistance  - Assess pain using appropriate pain scale  - Administer analgesics based on type and severity of pain and evaluate response  - Implement non-pharmacological measures as appropriate and evaluate response  - Consider cultural and social influences on pain and pain management  - Notify physician/advanced practitioner if interventions unsuccessful or patient reports new pain  Outcome: Progressing     Problem: INFECTION - ADULT  Goal: Absence or prevention of progression during hospitalization  Description: INTERVENTIONS:  - Assess and monitor for signs and symptoms of infection  - Monitor lab/diagnostic results  - Monitor all insertion sites, i e  indwelling lines, tubes, and drains  - Monitor endotracheal if appropriate and nasal secretions for changes in amount and color  - [place] appropriate cooling/warming therapies per order  - Administer medications as ordered  - Instruct and encourage patient and family to use good hand hygiene technique  - Identify and instruct in appropriate isolation precautions for identified infection/condition  Outcome: Progressing  Goal: Absence of fever/infection during neutropenic period  Description: INTERVENTIONS:  - Monitor WBC    Outcome: Progressing     Problem: SAFETY ADULT  Goal: Maintain or return to baseline ADL function  Description: INTERVENTIONS:  -  Assess patient's ability to carry out ADLs; assess patient's baseline for ADL function and identify physical deficits which impact ability to perform ADLs (bathing, care of mouth/teeth, toileting, grooming, dressing, etc )  - Assess/evaluate cause of self-care deficits   - Assess range of motion  - Assess patient's mobility; develop plan if impaired  - Assess patient's need for assistive devices and provide as appropriate  - Encourage maximum independence but intervene and supervise when necessary  - Involve family in performance of ADLs  - Assess for home care needs following discharge   - Consider OT consult to assist with ADL evaluation and planning for discharge  - Provide patient education as appropriate  Outcome: Progressing  Goal: Maintains/Returns to pre admission functional level  Description: INTERVENTIONS:  - Perform BMAT or MOVE assessment daily    - Set and communicate daily mobility goal to care team and patient/family/caregiver  - Collaborate with rehabilitation services on mobility goals if consulted  - Perform Range of Motion 3 times a day  - Reposition patient every 2 hours    - Dangle patient 3 times a day  - Stand patient 3 times a day  - Ambulate patient 3 times a day  - Out of bed to chair 3 times a day   - Out of bed for meals 3 times a day  - Out of bed for toileting  - Record patient progress and toleration of activity level   Outcome: Progressing  Goal: Patient will remain free of falls  Description: INTERVENTIONS:  - Educate patient/family on patient safety including physical limitations  - Instruct patient to call for assistance with activity   - Consult OT/PT to assist with strengthening/mobility   - Keep Call bell within reach  - Keep bed low and locked with side rails adjusted as appropriate  - Keep care items and personal belongings within reach  - Initiate and maintain comfort rounds  - Make Fall Risk Sign visible to staff  - Offer Toileting every 2 Hours, in advance of need  - Initiate/Maintain bed alarm  - Obtain necessary fall risk management equipment: bed alarm   - Apply yellow socks and bracelet for high fall risk patients  - Consider moving patient to room near nurses station  Outcome: Progressing     Problem: GENITOURINARY - ADULT  Goal: Maintains or returns to baseline urinary function  Description: INTERVENTIONS:  - Assess urinary function  - Encourage oral fluids to ensure adequate hydration if ordered  - Administer IV fluids as ordered to ensure adequate hydration  - Administer ordered medications as needed  - Offer frequent toileting  - Follow urinary retention protocol if ordered  Outcome: Progressing  Goal: Absence of urinary retention  Description: INTERVENTIONS:  - Assess patient’s ability to void and empty bladder  - Monitor I/O  - Bladder scan as needed  - Discuss with physician/AP medications to alleviate retention as needed  - Discuss catheterization for long term situations as appropriate  Outcome: Progressing  Goal: Urinary catheter remains patent  Description: INTERVENTIONS:  - Assess patency of urinary catheter  - If patient has a chronic wagner, consider changing catheter if non-functioning  - Follow guidelines for intermittent irrigation of non-functioning urinary catheter  Outcome: Progressing     Problem: HEMATOLOGIC - ADULT  Goal: Maintains hematologic stability  Description: INTERVENTIONS  - Assess for signs and symptoms of bleeding or hemorrhage  - Monitor labs  - Administer supportive blood products/factors as ordered and appropriate  Outcome: Progressing     Problem: MUSCULOSKELETAL - ADULT  Goal: Maintain or return mobility to safest level of function  Description: INTERVENTIONS:  - Assess patient's ability to carry out ADLs; assess patient's baseline for ADL function and identify physical deficits which impact ability to perform ADLs (bathing, care of mouth/teeth, toileting, grooming, dressing, etc )  - Assess/evaluate cause of self-care deficits   - Assess range of motion  - Assess patient's mobility  - Assess patient's need for assistive devices and provide as appropriate  - Encourage maximum independence but intervene and supervise when necessary  - Involve family in performance of ADLs  - Assess for home care needs following discharge   - Consider OT consult to assist with ADL evaluation and planning for discharge  - Provide patient education as appropriate  Outcome: Progressing  Goal: Maintain proper alignment of affected body part  Description: INTERVENTIONS:  - Support, maintain and protect limb and body alignment  - Provide patient/ family with appropriate education  Outcome: Progressing

## 2023-01-16 NOTE — PLAN OF CARE
Problem: PHYSICAL THERAPY ADULT  Goal: Performs mobility at highest level of function for planned discharge setting  See evaluation for individualized goals  Description: Treatment/Interventions: Functional transfer training, LE strengthening/ROM, Elevations, Therapeutic exercise, Cognitive reorientation, Patient/family training, Equipment eval/education, Bed mobility, Gait training, Compensatory technique education, Continued evaluation, Spoke to nursing, OT          See flowsheet documentation for full assessment, interventions and recommendations  Outcome: Progressing  Note: Prognosis: Fair  Problem List: Decreased strength, Decreased endurance, Decreased range of motion, Impaired balance, Decreased mobility, Decreased coordination, Impaired judgement, Pain  Assessment: Pt  able to toelrate activiites however as session progressed patient noted to have increased groin pain and deferred any further mobility  Cues for hand palcement for STS tarnsfers and Min A for all mobility provided  pt  noted to have unsteady gait with LE weakness however no gross LOB noted  Reported to RN about patient's increased pain  pt  in bed post session with all needs within reach  Pt  will benefit from continued skilled PT to address the mobility and endurance deficits as patient is functioning below his baseline  Elevated bed for STS transfer  Will continue to follow per PT POC  Pt  on restorative list for continued mobility  Barriers to Discharge: None     PT Discharge Recommendation: Post acute rehabilitation services    See flowsheet documentation for full assessment

## 2023-01-16 NOTE — ASSESSMENT & PLAN NOTE
Malnutrition Findings:   Adult Malnutrition type: Chronic illness  Adult Degree of Malnutrition: Other severe protein calorie malnutrition  Malnutrition Characteristics: Weight loss, Muscle loss, Fat loss                  360 Statement: related to inadequate energy intake as evidenced by 6% weight loss 12/20/22-1/11/23, hollowing of supraclavicular space, wasted interosseous, hollowing orbital  Intervention CCD diet, medical food supplements with meals  BMI Findings: Body mass index is 23 96 kg/m²

## 2023-01-16 NOTE — ASSESSMENT & PLAN NOTE
· History of hematuria due to bladder cancer  · Hg 7 1 prior to transfer   Received 1 unit PRBC  · Iron panel showing continued deficiency-unable to utilize IV iron given bacteremia  · Seen in consult by hematology oncology    Hemoglobin 8 2 > 8 1 > 8 0 > 7 9 > 8 >7 7>8 8    Received 1 unit PRBCs -1/15, hemoglobin stable today, monitor while inpatient

## 2023-01-16 NOTE — ASSESSMENT & PLAN NOTE
Lab Results   Component Value Date    EGFR 17 01/16/2023    EGFR 17 01/14/2023    EGFR 17 01/13/2023    CREATININE 3 14 (H) 01/16/2023    CREATININE 3 19 (H) 01/14/2023    CREATININE 3 14 (H) 01/13/2023     · Chronic CKD 4 at baseline    Monitor BMP

## 2023-01-16 NOTE — ASSESSMENT & PLAN NOTE
· History of pleural effusion with pleural cath  · Reports he feels as though he has fluid in his lung again and asking for this to be drained   · Chest x-ray only showed small pleural effusion  · Thoracic surgery follow up

## 2023-01-16 NOTE — PROGRESS NOTES
[address] [facility]  Progress Note - Flordia Barthel 1945, 76 y o  male MRN: [id number]  Unit/Bed#: E5 -01 Encounter: [id number]  Primary Care Provider: Nayeli Fischer MD   Date and time admitted to hospital: 1/7/2023 10:17 PM    Severe protein-calorie malnutrition [facility])  Assessment & Plan  Malnutrition Findings:   Adult Malnutrition type: Chronic illness  Adult Degree of Malnutrition: Other severe protein calorie malnutrition  Malnutrition Characteristics: Weight loss, Muscle loss, Fat loss                  360 Statement: related to inadequate energy intake as evidenced by 6% weight loss 12/20/22-1/11/23, hollowing of supraclavicular space, wasted interosseous, hollowing orbital  Intervention CCD diet, medical food supplements with meals  BMI Findings: Body mass index is 23 96 kg/m²  History of pleural effusion  Assessment & Plan  · History of pleural effusion with pleural cath  · Reports he feels as though he has fluid in his lung again and asking for this to be drained   · Chest x-ray only showed small pleural effusion  · Thoracic surgery follow up     Acute on chronic blood loss anemia  Assessment & Plan  · History of hematuria due to bladder cancer  · Hg 7 1 prior to transfer  Received 1 unit PRBC  · Iron panel showing continued deficiency-unable to utilize IV iron given bacteremia  · Seen in consult by hematology oncology    Hemoglobin 8 2 > 8 1 > 8 0 > 7 9 > 8 >7 7>8 8    Received 1 unit PRBCs yesterday, hemoglobin stable today, monitor while inpatient    Nephrostomy status ([facility] [address] )  Assessment & Plan  · Chronic bilateral nephrostomy tubes  · Prior to transfer patient reported abdominal pain, CT was done and shows: Stable bilateral percutaneous nephrostomy tubes with interval placement of a right double-J nephroureteral stent  Stable mild right upper pole caliectasis, with resolution of previously seen lower pole caliectasis and hydroureter    Persistent blood · Appreciate urology recommendations  · Monitor I&O    Elevated brain natriuretic peptide (BNP) level  Assessment & Plan  · BNP >2000  EF 60-65%, normal wall motion  · CT small stable left pleural effusion with drainage catheter in place  · Continue lasix 20 mg daily with hold parameters  · Monitor daily weight  · Repeat echocardiogram    Influenza A  Assessment & Plan  · Patient tested + for influenza A prior to discharge to [facility] on [address]  · Asymptomatic therefore not treated   · Negative flu on Jan 7    Chronic pleural effusion  Assessment & Plan  · Chronic pleural effusion  CT showing Stable small left pleural effusion with drainage catheter in place  Stable epicardial metastasis    Stage 4 chronic kidney disease [facility])  Assessment & Plan  Lab Results   Component Value Date    EGFR 17 01/16/2023    EGFR 17 01/14/2023    EGFR 17 01/13/2023    CREATININE 3 14 (H) 01/16/2023    CREATININE 3 19 (H) 01/14/2023    CREATININE 3 14 (H) 01/13/2023     · Chronic CKD 4 at baseline  Monitor BMP    Continuous opioid dependence (HCC)  Assessment & Plan  · Maintained on oxycodone 10mg TID prn, verified on PDMP    Gross hematuria  Assessment & Plan  · Patient has persistent hematuria due to bladder cancer  History of recurrent UTI on suppressive Bactrim  · CT A/P: Stable bladder wall thickening, compatible with known malignancy  Increased hyperdensity within the bladder lumen, compatible with new blood clot  · Recent admission at [facility] 12/12-12/22 for hematuria and obstructive uropathy in the setting of known bladder cancer s/p BCG and subsequent chemotherapy/radiation s/p bilateral chronic PCN tubes   Noted to have new right-sided hydronephrosis on CT s/p cystoscopy, clot evacuation w/ right ureteral stenting on 12/14 by Dr Herminia Kumar  · UA bloody, innumerable RBC/WBC, +leuks, no nitrites    Patient initially was on CBI per urology  Now transition to hand irrigation several times per day  Urology does not feel patient needs anything other than this right now    He unfortunately likely have chronic hematuria    1/16-hemoglobin stable today, monitor      Malignant neoplasm of anterior wall of urinary bladder ([facility] [address] )  Assessment & Plan  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, recurrence with CIS in 2016 BCG again  BCG refractory disease with pelvic metastases now s/p chemoradiation 2021, on current immunotherapy  · Chronic B/L PCN tubes  · CT A/P Jan 7: Stable bladder wall thickening, compatible with known malignancy  Stable metastatic retroperitoneal lymphadenopathy  Stable epicardial metastasis  · Per oncology note on Jan 6: Chemotherapy treatment should remain on hold for now untill he is clinically stable/discharged; particularly since he is positive flu  To follow-up with primary oncologist after discharge for reevaluation prior to resume treatment  · Port removed with bacteremia    Type 2 diabetes mellitus, with long-term current use of insulin (HCC)  Assessment & Plan  Lab Results   Component Value Date    HGBA1C 7 2 (H) 10/20/2022     · Currently receiving Lantus 15 units with sliding scale TID  · ADA diet  · Family reports allergy to humalog, so that is not being used    Recent Labs     01/15/23  1533 01/15/23  2100 01/16/23  0722 01/16/23  1134   POCGLU 264* 246* 139 203*       Blood Sugar Average: Last 72 hrs:  (P) 977 [id number]    Coronary artery disease of native artery of native heart with stable angina pectoris (HCC)  Assessment & Plan  · Outpatient regimen ASA, Imdur, metoprolol, zetia  · Repeat echocardiogram pending    * Bacteremia due to Enterobacter species  Assessment & Plan  · Patient was transferred from [facility] due to fever and positive blood cultures  · Blood culture growing Enterobacter  · His Port-A-Cath was thought to be the source  · Port-A-Cath removed by interventional radiology  · Repeat blood cultures no growth at 72 hours  · Spoke with ID    They are changing Cefepime to oral levaquin; patient has completed antibiotic therapy    Patient can be discharged once we have rehab placement    Loop recorder is not in blood stream and is not thought to be infected  Sinus tachycardia-resolved as of 1/9/2023  Assessment & Plan  · Presented with tachycardia likely multifactorial given fever, blood loss anemia and hypotension  · On Jan 2, patient had a brief period of unresponsiveness and noted with hypotension  Blood pressure improved after holding medications: furosemide, Imdur, metoprolol and tamsulosin  · 1/6/23 seen by cardiology recommended gradually increasing metoprolol to 37 5mg twice daily and subsequently to 50 mg twice daily if his blood pressure would allow  · Continue metoprolol 25mg BID for now with hold parameters        VTE Pharmacologic Prophylaxis:   Pharmacologic: Pharmacologic VTE Prophylaxis contraindicated due to Hematuria  Mechanical VTE Prophylaxis in Place: Yes    Patient Centered Rounds: I have performed bedside rounds with nursing staff today  Discussions with Specialists or Other Care Team Provider:     Education and Discussions with Family / Patient: Discussed treatment plan with family and patient who agree with current plan; encouraged to ask questions and participate  Time Spent for Care: 45 minutes  More than 50% of total time spent on counseling and coordination of care as described above  Current Length of Stay: 9 day(s)    Current Patient Status: Inpatient   Certification Statement: The patient will continue to require additional inpatient hospital stay due to Awaiting placement    Discharge Plan: To be determined    Code Status: Level 1 - Full Code      Subjective:   Patient seen and examined bedside, no acute distress or discomfort noted  Hemoglobin stable today and patient has completed antibiotics; white count did trend up slightly overnight, will monitor on morning labs    Heart rate variable, but but stable and patient hemodynamically stable as well  I have discussed with patient's granddaughter Yadira Mistry; awaiting placement at stable for discharge once placement obtained  Objective:     Vitals:   Temp (24hrs), Av °F (37 2 °C), Min:98 6 °F (37 °C), Max:99 8 °F (37 7 °C)    Temp:  [98 6 °F (37 °C)-99 8 °F (37 7 °C)] 98 7 °F (37 1 °C)  HR:  [] 82  Resp:  [17-18] 18  BP: (113-145)/(66-78) 118/76  SpO2:  [96 %-100 %] 97 %  Body mass index is 23 96 kg/m²  Input and Output Summary (last 24 hours): Intake/Output Summary (Last 24 hours) at 2023 1753  Last data filed at 2023 0601  Gross per 24 hour   Intake 330 ml   Output 2075 ml   Net -1745 ml       Physical Exam:     Physical Exam  Vitals and nursing note reviewed  Constitutional:       General: He is not in acute distress  Appearance: He is well-developed  HENT:      Head: Normocephalic and atraumatic  Eyes:      Conjunctiva/sclera: Conjunctivae normal    Cardiovascular:      Rate and Rhythm: Normal rate  Heart sounds: No murmur heard  Pulmonary:      Effort: Pulmonary effort is normal  No respiratory distress  Abdominal:      Palpations: Abdomen is soft  Tenderness: There is no abdominal tenderness  Musculoskeletal:         General: No swelling  Cervical back: Neck supple  Skin:     General: Skin is warm and dry  Neurological:      Mental Status: He is alert and oriented to person, place, and time  Psychiatric:         Mood and Affect: Mood normal            Additional Data:     Labs:    Results from last 7 days   Lab Units 23  0611 23  0510 23  0531   WBC Thousand/uL 13 09*   < > 11 73*   HEMOGLOBIN g/dL 8 8*   < > 7 9*   HEMATOCRIT % 27 5*   < > 25 5*   PLATELETS Thousands/uL 253   < > 224   NEUTROS PCT %  --   --  68   LYMPHS PCT %  --   --  17   MONOS PCT %  --   --  7   EOS PCT %  --   --  7*    < > = values in this interval not displayed       Results from last 7 days   Lab Units 23  2205 01/13/23  0531 01/12/23  0541   SODIUM mmol/L 138   < > 141   POTASSIUM mmol/L 4 7   < > 4 7   CHLORIDE mmol/L 102   < > 105   CO2 mmol/L 26   < > 26   BUN mg/dL 49*   < > 55*   CREATININE mg/dL 3 14*   < > 3 12*   ANION GAP mmol/L 10   < > 10   CALCIUM mg/dL 9 3   < > 9 5   ALBUMIN g/dL  --   --  2 1*   TOTAL BILIRUBIN mg/dL  --   --  0 22   ALK PHOS U/L  --   --  133*   ALT U/L  --   --  23   AST U/L  --   --  28   GLUCOSE RANDOM mg/dL 128   < > 129    < > = values in this interval not displayed  Results from last 7 days   Lab Units 01/16/23  1538 01/16/23  1134 01/16/23  0722 01/15/23  2100 01/15/23  1533 01/15/23  1108 01/15/23  0712 01/14/23  2057 01/14/23  1631 01/14/23  1144 01/14/23  0723 01/13/23 2056   POC GLUCOSE mg/dl 130 203* 139 246* 264* 106 137 155* 157* 240* 120 262*                   * I Have Reviewed All Lab Data Listed Above  * Additional Pertinent Lab Tests Reviewed:  All Labs Within Last 24 Hours Reviewed    Imaging:    Imaging Reports Reviewed Today Include:   Imaging Personally Reviewed by Myself Includes:      Recent Cultures (last 7 days):           Last 24 Hours Medication List:   Current Facility-Administered Medications   Medication Dose Route Frequency Provider Last Rate   • acetaminophen  975 mg Oral [facility] Amish Porter PA-C     • ALPRAZolam  0 25 mg Oral BID PRN Martha Parker, CRNP     • ARIPiprazole  2 mg Oral Daily Martha Parker, MOENP     • azelastine  1 spray Each Nare BID PRN EVANGELINA Mccray     • bimatoprost  1 drop Both Eyes HS Martha Parker, CRNP     • calcitriol  0 25 mcg Oral Daily Martha Parker, CRNP     • cyanocobalamin  1,000 mcg Oral Daily Martha Elena, CRNP     • ezetimibe  10 mg Oral Daily Martha Parker, CRNP     • furosemide  20 mg Oral Daily EVANGELINA Mccray     • gabapentin  300 mg Oral HS EVANGELINA Mccray     • HYDROmorphone  0 2 mg Intravenous 4x Daily PRN EVANGELINA Mccray     • insulin glargine  15 Units Subcutaneous HS Lorna Herb Reyes PA-C     • metoprolol tartrate  25 mg Oral Q12H [facility], EVANGELINA     • multivitamin stress formula  1 tablet Oral Daily EVANGELINA Rivera     • ondansetron  4 mg Intravenous Q6H PRN EVANGELINA Rivera     • oxybutynin  5 mg Oral TID EVANGELINA Aaron     • oxyCODONE  10 mg Oral TID PRN EVANGELINA Rivera     • pantoprazole  20 mg Oral Early Morning EVANGELINA Rivera     • polyethylene glycol  17 g Oral Daily PRN EVANGELINA Rivera     • senna  2 tablet Oral BID PRN Ada Mcclelland DO     • simethicone  80 mg Oral 4x Daily PRN EVANGELINA Rivera          Today, Patient Was Seen By: Khoa Rapp MD    ** Please Note: Dictation voice to text software may have been used in the creation of this document   **

## 2023-01-16 NOTE — ASSESSMENT & PLAN NOTE
Lab Results   Component Value Date    HGBA1C 7 2 (H) 10/20/2022     · Currently receiving Lantus 15 units with sliding scale TID  · ADA diet  · Family reports allergy to humalog, so that is not being used    Recent Labs     01/15/23  1533 01/15/23  2100 01/16/23  0722 01/16/23  1134   POCGLU 264* 246* 139 203*       Blood Sugar Average: Last 72 hrs:  (P) 006 [id number]

## 2023-01-16 NOTE — ASSESSMENT & PLAN NOTE
· Patient tested + for influenza A prior to discharge to [facility] on [address]  · Asymptomatic therefore not treated   · Negative flu on Jan 7

## 2023-01-16 NOTE — ASSESSMENT & PLAN NOTE
· Patient was transferred from [address] [place] [address] due to fever and positive blood cultures  · Blood culture growing Enterobacter  · His Port-A-Cath was thought to be the source  · Port-A-Cath removed by interventional radiology  · Repeat blood cultures no growth at 72 hours  · Spoke with ID  They are changing Cefepime to oral levaquin; patient has completed antibiotic therapy    Patient can be discharged once we have rehab placement    Loop recorder is not in blood stream and is not thought to be infected

## 2023-01-16 NOTE — ASSESSMENT & PLAN NOTE
· Patient has persistent hematuria due to bladder cancer  History of recurrent UTI on suppressive Bactrim  · CT A/P: Stable bladder wall thickening, compatible with known malignancy  Increased hyperdensity within the bladder lumen, compatible with new blood clot  · Recent admission at [facility] 12/12-12/22 for hematuria and obstructive uropathy in the setting of known bladder cancer s/p BCG and subsequent chemotherapy/radiation s/p bilateral chronic PCN tubes   Noted to have new right-sided hydronephrosis on CT s/p cystoscopy, clot evacuation w/ right ureteral stenting on 12/14 by Dr Miguelina Urias  · UA bloody, innumerable RBC/WBC, +leuks, no nitrites    Patient initially was on CBI per urology  Now transition to hand irrigation several times per day  Urology does not feel patient needs anything other than this right now    He unfortunately likely have chronic hematuria    1/16-hemoglobin stable today, monitor

## 2023-01-16 NOTE — PHYSICAL THERAPY NOTE
Physical Therapy Treatment Note     01/16/23 1502   PT Last Visit   PT Visit Date 01/16/23   Note Type   Note Type Treatment   Pain Assessment   Pain Assessment Tool 0-10   Pain Score 8   Pain Location/Orientation Location: Groin   Restrictions/Precautions   Weight Bearing Precautions Per Order No   Braces or Orthoses Splint  (LUE)   Other Precautions Contact/isolation; Fall Risk;Telemetry;Pain;Multiple lines; Bed Alarm   General   Chart Reviewed Yes   Subjective   Subjective Pt  agreeable to PT   Bed Mobility   Supine to Sit 4  Minimal assistance   Additional items Assist x 1;Bedrails;HOB elevated; Increased time required   Sit to Supine 5  Supervision   Additional items Assist x 1; Increased time required; Bedrails;HOB elevated   Transfers   Sit to Stand 4  Minimal assistance   Additional items Assist x 1; Increased time required;Verbal cues   Stand to Sit 4  Minimal assistance   Additional items Assist x 1; Increased time required;Verbal cues   Stand pivot 4  Minimal assistance   Additional items Assist x 1; Increased time required;Verbal cues   Ambulation/Elevation   Gait pattern Improper Weight shift; Forward Flexion;Decreased foot clearance; Inconsistent eliecer; Foward flexed; Short stride; Excessively slow;Decreased heel strike;Decreased toe off   Gait Assistance 4  Minimal assist   Additional items Assist x 1;Verbal cues   Assistive Device Rolling walker   Distance 100ft   Balance   Static Sitting Fair +   Dynamic Sitting Fair   Static Standing Fair -   Dynamic Standing Poor +   Ambulatory Poor +   Endurance Deficit   Endurance Deficit Yes   Endurance Deficit Description Fatigue Pain   Activity Tolerance   Activity Tolerance Patient tolerated treatment well;Patient limited by pain; Patient limited by fatigue   Nurse Made Aware Yes   Exercises   THR Supine;10 reps;AROM; Bilateral   Assessment   Prognosis Fair   Problem List Decreased strength;Decreased endurance;Decreased range of motion; Impaired balance;Decreased mobility; Decreased coordination; Impaired judgement;Pain   Assessment Pt  able to toelrate activiites however as session progressed patient noted to have increased groin pain and deferred any further mobility  Cues for hand palcement for STS tarnsfers and Min A for all mobility provided  pt  noted to have unsteady gait with LE weakness however no gross LOB noted  Reported to RN about patient's increased pain  pt  in bed post session with all needs within reach  Pt  will benefit from continued skilled PT to address the mobility and endurance deficits as patient is functioning below his baseline  Elevated bed for STS transfer  Will continue to follow per PT POC  Pt  on restorative list for continued mobility   Barriers to Discharge None   Goals   Patient Goals Pain to go away   STG Expiration Date 01/23/23   PT Treatment Day 2   Plan   Treatment/Interventions LE strengthening/ROM; Functional transfer training; Therapeutic exercise; Bed mobility;Gait training;Equipment eval/education;Patient/family training;Spoke to nursing  (restorative)   Progress Progressing toward goals   PT Frequency 3-5x/wk   Recommendation   PT Discharge Recommendation Post acute rehabilitation services   AM-PAC Basic Mobility Inpatient   Turning in Flat Bed Without Bedrails 3   Lying on Back to Sitting on Edge of Flat Bed Without Bedrails 3   Moving Bed to Chair 3   Standing Up From Chair Using Arms 3   Walk in Room 3   Climb 3-5 Stairs With Railing 3   Basic Mobility Inpatient Raw Score 18   Basic Mobility Standardized Score 41 05   Highest Level Of Mobility   JH-HLM Goal 6: Walk 10 steps or more   JH-HLM Achieved 7: Walk 25 feet or more   End of Consult   Patient Position at End of Consult Supine; All needs within reach;Bed/Chair alarm activated           Mary Jane Paris PTA    An AM-PAC basic mobility standardized score less than 42 9 suggest the patient may benefit from discharge to post-acute rehab services

## 2023-01-16 NOTE — PLAN OF CARE
Problem: Nutrition/Hydration-ADULT  Goal: Nutrient/Hydration intake appropriate for improving, restoring or maintaining nutritional needs  Description: Monitor and assess patient's nutrition/hydration status for malnutrition  Collaborate with interdisciplinary team and initiate plan and interventions as ordered  Monitor patient's weight and dietary intake as ordered or per policy  Utilize nutrition screening tool and intervene as necessary  Determine patient's food preferences and provide high-protein, high-caloric foods as appropriate       INTERVENTIONS:  - Monitor oral intake, urinary output, labs, and treatment plans  - Assess nutrition and hydration status and recommend course of action  - Evaluate amount of meals eaten  - Assist patient with eating if necessary   - Allow adequate time for meals  - Recommend/ encourage appropriate diets, oral nutritional supplements, and vitamin/mineral supplements  - Order, calculate, and assess calorie counts as needed  - Recommend, monitor, and adjust tube feedings and TPN/PPN based on assessed needs  - Assess need for intravenous fluids  - Provide specific nutrition/hydration education as appropriate  - Include patient/family/caregiver in decisions related to nutrition  1/16/2023 1220 by Carissa Cee RN  Outcome: Progressing  1/16/2023 1219 by Carissa Cee RN  Outcome: Progressing     Problem: Prexisting or High Potential for Compromised Skin Integrity  Goal: Skin integrity is maintained or improved  Description: INTERVENTIONS:  - Identify patients at risk for skin breakdown  - Assess and monitor skin integrity  - Assess and monitor nutrition and hydration status  - Monitor labs   - Assess for incontinence   - Turn and reposition patient  - Assist with mobility/ambulation  - Relieve pressure over bony prominences  - Avoid friction and shearing  - Provide appropriate hygiene as needed including keeping skin clean and dry  - Evaluate need for skin moisturizer/barrier cream  - Collaborate with interdisciplinary team   - Patient/family teaching  - Consider wound care consult   1/16/2023 1220 by Aimee Lomas RN  Outcome: Progressing  1/16/2023 1219 by Aimee Lomas RN  Outcome: Progressing     Problem: MOBILITY - ADULT  Goal: Maintain or return to baseline ADL function  Description: INTERVENTIONS:  -  Assess patient's ability to carry out ADLs; assess patient's baseline for ADL function and identify physical deficits which impact ability to perform ADLs (bathing, care of mouth/teeth, toileting, grooming, dressing, etc )  - Assess/evaluate cause of self-care deficits   - Assess range of motion  - Assess patient's mobility; develop plan if impaired  - Assess patient's need for assistive devices and provide as appropriate  - Encourage maximum independence but intervene and supervise when necessary  - Involve family in performance of ADLs  - Assess for home care needs following discharge   - Consider OT consult to assist with ADL evaluation and planning for discharge  - Provide patient education as appropriate  1/16/2023 1220 by Aimee Lomas RN  Outcome: Progressing  1/16/2023 1219 by Aimee Lomas RN  Outcome: Progressing  Goal: Maintains/Returns to pre admission functional level  Description: INTERVENTIONS:  - Perform BMAT or MOVE assessment daily    - Set and communicate daily mobility goal to care team and patient/family/caregiver  - Collaborate with rehabilitation services on mobility goals if consulted  - Perform Range of Motion 3 times a day  - Reposition patient every 2 hours    - Dangle patient 3 times a day  - Stand patient 3 times a day  - Ambulate patient 3 times a day  - Out of bed to chair 3 times a day   - Out of bed for meals 3 times a day  - Out of bed for toileting  - Record patient progress and toleration of activity level   1/16/2023 1220 by Aimee Lomas RN  Outcome: Progressing  1/16/2023 1219 by Aimee Lomas RN  Outcome: Progressing     Problem: Potential for Falls  Goal: Patient will remain free of falls  Description: INTERVENTIONS:  - Educate patient/family on patient safety including physical limitations  - Instruct patient to call for assistance with activity   - Consult OT/PT to assist with strengthening/mobility   - Keep Call bell within reach  - Keep bed low and locked with side rails adjusted as appropriate  - Keep care items and personal belongings within reach  - Initiate and maintain comfort rounds  - Make Fall Risk Sign visible to staff  - Offer Toileting every 2 Hours, in advance of need  - Initiate/Maintain bed alarm  - Obtain necessary fall risk management equipment: Alarm  - Apply yellow socks and bracelet for high fall risk patients  - Consider moving patient to room near nurses station  1/16/2023 1220 by Abbey White RN  Outcome: Progressing  1/16/2023 1219 by Abbey White RN  Outcome: Progressing     Problem: PAIN - ADULT  Goal: Verbalizes/displays adequate comfort level or baseline comfort level  Description: Interventions:  - Encourage patient to monitor pain and request assistance  - Assess pain using appropriate pain scale  - Administer analgesics based on type and severity of pain and evaluate response  - Implement non-pharmacological measures as appropriate and evaluate response  - Consider cultural and social influences on pain and pain management  - Notify physician/advanced practitioner if interventions unsuccessful or patient reports new pain  1/16/2023 1220 by Abbey White RN  Outcome: Progressing  1/16/2023 1219 by Abbey White RN  Outcome: Progressing     Problem: INFECTION - ADULT  Goal: Absence or prevention of progression during hospitalization  Description: INTERVENTIONS:  - Assess and monitor for signs and symptoms of infection  - Monitor lab/diagnostic results  - Monitor all insertion sites, i e  indwelling lines, tubes, and drains  - Monitor endotracheal if appropriate and nasal secretions for changes in amount and color  - [place] appropriate cooling/warming therapies per order  - Administer medications as ordered  - Instruct and encourage patient and family to use good hand hygiene technique  - Identify and instruct in appropriate isolation precautions for identified infection/condition  1/16/2023 1220 by Manuel Roque RN  Outcome: Progressing  1/16/2023 1219 by Manuel Roque RN  Outcome: Progressing  Goal: Absence of fever/infection during neutropenic period  Description: INTERVENTIONS:  - Monitor WBC    1/16/2023 1220 by Manuel Roque RN  Outcome: Progressing  1/16/2023 1219 by Manuel Roque RN  Outcome: Progressing     Problem: SAFETY ADULT  Goal: Maintain or return to baseline ADL function  Description: INTERVENTIONS:  -  Assess patient's ability to carry out ADLs; assess patient's baseline for ADL function and identify physical deficits which impact ability to perform ADLs (bathing, care of mouth/teeth, toileting, grooming, dressing, etc )  - Assess/evaluate cause of self-care deficits   - Assess range of motion  - Assess patient's mobility; develop plan if impaired  - Assess patient's need for assistive devices and provide as appropriate  - Encourage maximum independence but intervene and supervise when necessary  - Involve family in performance of ADLs  - Assess for home care needs following discharge   - Consider OT consult to assist with ADL evaluation and planning for discharge  - Provide patient education as appropriate  1/16/2023 1220 by Manuel Roque RN  Outcome: Progressing  1/16/2023 1219 by Manuel Roque RN  Outcome: Progressing  Goal: Maintains/Returns to pre admission functional level  Description: INTERVENTIONS:  - Perform BMAT or MOVE assessment daily    - Set and communicate daily mobility goal to care team and patient/family/caregiver  - Collaborate with rehabilitation services on mobility goals if consulted  - Perform Range of Motion 3 times a day  - Reposition patient every 2 hours    - Dangle patient 3 times a day  - Stand patient 3 times a day  - Ambulate patient 3 times a day  - Out of bed to chair 3 times a day   - Out of bed for meals 3 times a day  - Out of bed for toileting  - Record patient progress and toleration of activity level   1/16/2023 1220 by Nik Marquez RN  Outcome: Progressing  1/16/2023 1219 by Nik Marquez RN  Outcome: Progressing  Goal: Patient will remain free of falls  Description: INTERVENTIONS:  - Educate patient/family on patient safety including physical limitations  - Instruct patient to call for assistance with activity   - Consult OT/PT to assist with strengthening/mobility   - Keep Call bell within reach  - Keep bed low and locked with side rails adjusted as appropriate  - Keep care items and personal belongings within reach  - Initiate and maintain comfort rounds  - Make Fall Risk Sign visible to staff  - Offer Toileting every 2 Hours, in advance of need  - Initiate/Maintain bed alarm  - Obtain necessary fall risk management equipment: Alarm  - Apply yellow socks and bracelet for high fall risk patients  - Consider moving patient to room near nurses station  1/16/2023 1220 by Nik Marquez RN  Outcome: Progressing  1/16/2023 1219 by Nik Marquez RN  Outcome: Progressing     Problem: GENITOURINARY - ADULT  Goal: Maintains or returns to baseline urinary function  Description: INTERVENTIONS:  - Assess urinary function  - Encourage oral fluids to ensure adequate hydration if ordered  - Administer IV fluids as ordered to ensure adequate hydration  - Administer ordered medications as needed  - Offer frequent toileting  - Follow urinary retention protocol if ordered  1/16/2023 1220 by Nik Marquez RN  Outcome: Progressing  1/16/2023 1219 by Nik Marquez RN  Outcome: Progressing  Goal: Absence of urinary retention  Description: INTERVENTIONS:  - Assess patient’s ability to void and empty bladder  - Monitor I/O  - Bladder scan as needed  - Discuss with physician/AP medications to alleviate retention as needed  - Discuss catheterization for long term situations as appropriate  1/16/2023 1220 by Aimee Lomas RN  Outcome: Progressing  1/16/2023 1219 by Aimee Lomas RN  Outcome: Progressing  Goal: Urinary catheter remains patent  Description: INTERVENTIONS:  - Assess patency of urinary catheter  - If patient has a chronic wagner, consider changing catheter if non-functioning  - Follow guidelines for intermittent irrigation of non-functioning urinary catheter  1/16/2023 1220 by Aimee Lomas RN  Outcome: Progressing  1/16/2023 1219 by Aimee Lomas RN  Outcome: Progressing     Problem: HEMATOLOGIC - ADULT  Goal: Maintains hematologic stability  Description: INTERVENTIONS  - Assess for signs and symptoms of bleeding or hemorrhage  - Monitor labs  - Administer supportive blood products/factors as ordered and appropriate  1/16/2023 1220 by Aimee Lomas RN  Outcome: Progressing  1/16/2023 1219 by Aimee Lomas RN  Outcome: Progressing     Problem: MUSCULOSKELETAL - ADULT  Goal: Maintain or return mobility to safest level of function  Description: INTERVENTIONS:  - Assess patient's ability to carry out ADLs; assess patient's baseline for ADL function and identify physical deficits which impact ability to perform ADLs (bathing, care of mouth/teeth, toileting, grooming, dressing, etc )  - Assess/evaluate cause of self-care deficits   - Assess range of motion  - Assess patient's mobility  - Assess patient's need for assistive devices and provide as appropriate  - Encourage maximum independence but intervene and supervise when necessary  - Involve family in performance of ADLs  - Assess for home care needs following discharge   - Consider OT consult to assist with ADL evaluation and planning for discharge  - Provide patient education as appropriate  1/16/2023 1220 by Aimee Lomas RN  Outcome: Progressing  1/16/2023 1219 by Aimee Lomas RN  Outcome: Progressing  Goal: Maintain proper alignment of affected body part  Description: INTERVENTIONS:  - Support, maintain and protect limb and body alignment  - Provide patient/ family with appropriate education  1/16/2023 1220 by Natividad Gan RN  Outcome: Progressing  1/16/2023 1219 by Natividad Gan RN  Outcome: Progressing

## 2023-01-17 ENCOUNTER — APPOINTMENT (INPATIENT)
Dept: CT IMAGING | Facility: HOSPITAL | Age: 78
End: 2023-01-17

## 2023-01-17 LAB
ALBUMIN SERPL BCP-MCNC: 2.3 G/DL (ref 3.5–5)
ALP SERPL-CCNC: 133 U/L (ref 46–116)
ALT SERPL W P-5'-P-CCNC: 47 U/L (ref 12–78)
ANION GAP SERPL CALCULATED.3IONS-SCNC: 10 MMOL/L (ref 4–13)
AST SERPL W P-5'-P-CCNC: 39 U/L (ref 5–45)
BASOPHILS # BLD AUTO: 0.05 THOUSANDS/ÂΜL (ref 0–0.1)
BASOPHILS NFR BLD AUTO: 0 % (ref 0–1)
BILIRUB SERPL-MCNC: 0.19 MG/DL (ref 0.2–1)
BUN SERPL-MCNC: 50 MG/DL (ref 5–25)
CALCIUM ALBUM COR SERPL-MCNC: 11.1 MG/DL (ref 8.3–10.1)
CALCIUM SERPL-MCNC: 9.7 MG/DL (ref 8.3–10.1)
CHLORIDE SERPL-SCNC: 102 MMOL/L (ref 96–108)
CO2 SERPL-SCNC: 26 MMOL/L (ref 21–32)
CREAT SERPL-MCNC: 3.12 MG/DL (ref 0.6–1.3)
EOSINOPHIL # BLD AUTO: 1.04 THOUSAND/ÂΜL (ref 0–0.61)
EOSINOPHIL NFR BLD AUTO: 7 % (ref 0–6)
ERYTHROCYTE [DISTWIDTH] IN BLOOD BY AUTOMATED COUNT: 17.7 % (ref 11.6–15.1)
GFR SERPL CREATININE-BSD FRML MDRD: 18 ML/MIN/1.73SQ M
GLUCOSE SERPL-MCNC: 131 MG/DL (ref 65–140)
GLUCOSE SERPL-MCNC: 139 MG/DL (ref 65–140)
GLUCOSE SERPL-MCNC: 152 MG/DL (ref 65–140)
GLUCOSE SERPL-MCNC: 207 MG/DL (ref 65–140)
GLUCOSE SERPL-MCNC: 332 MG/DL (ref 65–140)
HCT VFR BLD AUTO: 28.8 % (ref 36.5–49.3)
HGB BLD-MCNC: 9.1 G/DL (ref 12–17)
IMM GRANULOCYTES # BLD AUTO: 0.1 THOUSAND/UL (ref 0–0.2)
IMM GRANULOCYTES NFR BLD AUTO: 1 % (ref 0–2)
LYMPHOCYTES # BLD AUTO: 1.98 THOUSANDS/ÂΜL (ref 0.6–4.47)
LYMPHOCYTES NFR BLD AUTO: 14 % (ref 14–44)
MAGNESIUM SERPL-MCNC: 1.9 MG/DL (ref 1.6–2.6)
MCH RBC QN AUTO: 29.1 PG (ref 26.8–34.3)
MCHC RBC AUTO-ENTMCNC: 31.6 G/DL (ref 31.4–37.4)
MCV RBC AUTO: 92 FL (ref 82–98)
MONOCYTES # BLD AUTO: 0.86 THOUSAND/ÂΜL (ref 0.17–1.22)
MONOCYTES NFR BLD AUTO: 6 % (ref 4–12)
NEUTROPHILS # BLD AUTO: 10.6 THOUSANDS/ÂΜL (ref 1.85–7.62)
NEUTS SEG NFR BLD AUTO: 72 % (ref 43–75)
NRBC BLD AUTO-RTO: 0 /100 WBCS
PHOSPHATE SERPL-MCNC: 4.1 MG/DL (ref 2.3–4.1)
PLATELET # BLD AUTO: 269 THOUSANDS/UL (ref 149–390)
PMV BLD AUTO: 9.6 FL (ref 8.9–12.7)
POTASSIUM SERPL-SCNC: 5 MMOL/L (ref 3.5–5.3)
PROT SERPL-MCNC: 7.8 G/DL (ref 6.4–8.4)
RBC # BLD AUTO: 3.13 MILLION/UL (ref 3.88–5.62)
SODIUM SERPL-SCNC: 138 MMOL/L (ref 135–147)
WBC # BLD AUTO: 14.63 THOUSAND/UL (ref 4.31–10.16)

## 2023-01-17 RX ORDER — DIPHENHYDRAMINE HCL 25 MG
25 TABLET ORAL EVERY 6 HOURS PRN
Status: DISCONTINUED | OUTPATIENT
Start: 2023-01-17 | End: 2023-01-21

## 2023-01-17 RX ADMIN — OXYBUTYNIN CHLORIDE 5 MG: 5 TABLET ORAL at 23:06

## 2023-01-17 RX ADMIN — PANTOPRAZOLE SODIUM 20 MG: 20 TABLET, DELAYED RELEASE ORAL at 05:22

## 2023-01-17 RX ADMIN — BIMATOPROST 1 DROP: 0.1 SOLUTION/ DROPS OPHTHALMIC at 23:05

## 2023-01-17 RX ADMIN — OXYCODONE HYDROCHLORIDE 10 MG: 10 TABLET ORAL at 14:14

## 2023-01-17 RX ADMIN — EZETIMIBE 10 MG: 10 TABLET ORAL at 10:08

## 2023-01-17 RX ADMIN — ACETAMINOPHEN 975 MG: 325 TABLET, FILM COATED ORAL at 05:22

## 2023-01-17 RX ADMIN — OXYBUTYNIN CHLORIDE 5 MG: 5 TABLET ORAL at 16:01

## 2023-01-17 RX ADMIN — OXYBUTYNIN CHLORIDE 5 MG: 5 TABLET ORAL at 10:09

## 2023-01-17 RX ADMIN — ACETAMINOPHEN 975 MG: 325 TABLET, FILM COATED ORAL at 14:14

## 2023-01-17 RX ADMIN — FUROSEMIDE 20 MG: 20 TABLET ORAL at 10:09

## 2023-01-17 RX ADMIN — INSULIN GLARGINE 15 UNITS: 100 INJECTION, SOLUTION SUBCUTANEOUS at 23:05

## 2023-01-17 RX ADMIN — CALCITRIOL 0.25 MCG: 0.25 CAPSULE, LIQUID FILLED ORAL at 10:09

## 2023-01-17 RX ADMIN — ACETAMINOPHEN 975 MG: 325 TABLET, FILM COATED ORAL at 23:06

## 2023-01-17 RX ADMIN — B-COMPLEX W/ C & FOLIC ACID TAB 1 TABLET: TAB at 10:08

## 2023-01-17 RX ADMIN — ARIPIPRAZOLE 2 MG: 2 TABLET ORAL at 10:08

## 2023-01-17 RX ADMIN — CYANOCOBALAMIN TAB 500 MCG 1000 MCG: 500 TAB at 10:09

## 2023-01-17 RX ADMIN — GABAPENTIN 300 MG: 300 CAPSULE ORAL at 23:06

## 2023-01-17 RX ADMIN — METOPROLOL TARTRATE 25 MG: 25 TABLET, FILM COATED ORAL at 23:06

## 2023-01-17 RX ADMIN — DIPHENHYDRAMINE HCL 25 MG: 25 TABLET, COATED ORAL at 14:14

## 2023-01-17 RX ADMIN — METOPROLOL TARTRATE 25 MG: 25 TABLET, FILM COATED ORAL at 10:09

## 2023-01-17 RX ADMIN — OXYCODONE HYDROCHLORIDE 10 MG: 10 TABLET ORAL at 23:36

## 2023-01-17 NOTE — OCCUPATIONAL THERAPY NOTE
Occupational Therapy Progress Note     Patient Name: Rubén Garcia  Today's Date: 1/17/2023  Problem List  Principal Problem:    Bacteremia due to Enterobacter species  Active Problems:    Coronary artery disease of native artery of native heart with stable angina pectoris (HCC)    Type 2 diabetes mellitus, with long-term current use of insulin (HCC)    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)    Gross hematuria    Continuous opioid dependence (HCC)    Stage 4 chronic kidney disease (HCC)    Chronic pleural effusion    Influenza A    Elevated brain natriuretic peptide (BNP) level    Nephrostomy status (HCC)    Acute on chronic blood loss anemia    History of pleural effusion    Severe protein-calorie malnutrition ([facility] [address] )            01/17/23 0070   OT Last Visit   OT Visit Date 01/17/23   Note Type   Note Type Treatment   Pain Assessment   Pain Assessment Tool 0-10   Pain Score No Pain   Restrictions/Precautions   Weight Bearing Precautions Per Order No   Braces or Orthoses Splint  (soft resting hand splint, R UE)   Other Precautions Contact/isolation;Cognitive; Chair Alarm; Bed Alarm; Fall Risk;Multiple lines;Limb alert  (Limb alert R UE  B/L nephrostomy tubes)   ADL   Where Assessed Edge of bed   Grooming Assistance 5  Supervision/Setup   Grooming Deficit Setup;Supervision/safety; Increased time to complete   UB Dressing Assistance 4  Minimal Assistance   UB Dressing Deficit Setup;Verbal cueing;Supervision/safety; Increased time to complete; Thread RUE   Functional Standing Tolerance   Time ~3-4 mins   Activity Dynamic standing balance activity   Comments Min A for steadying w/ use of RW   Bed Mobility   Supine to Sit 3  Moderate assistance   Additional items Assist x 1;Bedrails;HOB elevated; Increased time required;Verbal cues  (trunk support)   Sit to Supine Unable to assess   Additional Comments Pt seated OOB in chair with chair alarm activated at end of session  Call bell and phone within reach  All needs met and pt reports no further questions for OT at this time  Transfers   Sit to Stand 3  Moderate assistance   Additional items Assist x 1;Bedrails;Armrests; Increased time required;Verbal cues   Stand to Sit 4  Minimal assistance   Additional items Assist x 1; Armrests; Increased time required;Verbal cues   Additional Comments Cues for safe technique and hand placement  Increased assist required to initiate forward weight shift from surface for sit>stand   Functional Mobility   Functional Mobility 4  Minimal assistance   Additional Comments Assist x1   Additional items Rolling walker   Cognition   Overall Cognitive Status Impaired   Arousal/Participation Alert; Cooperative   Attention Attends with cues to redirect   Orientation Level Oriented X4   Memory Decreased recall of precautions   Following Commands Follows one step commands with increased time or repetition   Comments Pleasant and cooperative  Motivated in therapy   Activity Tolerance   Activity Tolerance Patient limited by fatigue;Patient tolerated treatment well   Assessment   Assessment Pt seen for OT treatment session focusing on functional activity tolerance, bed mobility, ADLs, and functional transfers/mobility  Pt alert and cooperative throughout session  Pt lying supine at start of session, completing bed mobility (supine>sit) w/ Mod A with assist for trunk support  Transfers (sit<>stand) completed w/ Mod-Min A with Mod A required to initiate forward weight shift from surface for sit>stand and Min A for controlled descent to surface for stand>sit  Pt required verbal cues for safe technique and hand placement during transitions  Min A required for functional mobility with Poor+ dynamic standing balance demonstrated w/ use of RW  Pt w/ increased dynamic standing tolerance from previous session, tolerating approx  3-4 mins of standing activity prior to needing seated rest break   Grooming tasks completed w/ Supervision w/ setup required and increased time to complete  UB dressing completed w/ Min A with assist to thread R UE into gown  Pt seated OOB in chair with chair alarm activated at end of session  Call bell and phone within reach  All needs met and pt reports no further questions for OT at this time  Continue to recommend STR when medically cleared  OT to follow pt on caseload  Plan   Treatment Interventions ADL retraining;Functional transfer training; Endurance training;Patient/family training;Equipment evaluation/education; Compensatory technique education;Continued evaluation; Activityengagement   Goal Expiration Date 01/23/23   OT Treatment Day 3   OT Frequency 3-5x/wk   Recommendation   OT Discharge Recommendation Post acute rehabilitation services   Additional Comments  The patient's raw score on the AM-PAC Daily Activity inpatient short form is 15, standardized score is 34 69, less than 39 4  Patients at this level are likely to benefit from discharge to post-acute rehabilitation services  Please refer to the recommendation of the Occupational Therapist for safe discharge planning     AM-PAC Daily Activity Inpatient   Lower Body Dressing 2   Bathing 2   Toileting 2   Upper Body Dressing 3   Grooming 3   Eating 3   Daily Activity Raw Score 15   Daily Activity Standardized Score (Calc for Raw Score >=11) 34 69   AM-PAC Applied Cognition Inpatient   Following a Speech/Presentation 4   Understanding Ordinary Conversation 4   Taking Medications 3   Remembering Where Things Are Placed or Put Away 3   Remembering List of 4-5 Errands 3   Taking Care of Complicated Tasks 3   Applied Cognition Raw Score 20   Applied Cognition Standardized Score 41 76         Cheryle Grime, OTR/L

## 2023-01-17 NOTE — PLAN OF CARE
Problem: Nutrition/Hydration-ADULT  Goal: Nutrient/Hydration intake appropriate for improving, restoring or maintaining nutritional needs  Description: Monitor and assess patient's nutrition/hydration status for malnutrition  Collaborate with interdisciplinary team and initiate plan and interventions as ordered  Monitor patient's weight and dietary intake as ordered or per policy  Utilize nutrition screening tool and intervene as necessary  Determine patient's food preferences and provide high-protein, high-caloric foods as appropriate       INTERVENTIONS:  - Monitor oral intake, urinary output, labs, and treatment plans  - Assess nutrition and hydration status and recommend course of action  - Evaluate amount of meals eaten  - Assist patient with eating if necessary   - Allow adequate time for meals  - Recommend/ encourage appropriate diets, oral nutritional supplements, and vitamin/mineral supplements  - Order, calculate, and assess calorie counts as needed  - Recommend, monitor, and adjust tube feedings and TPN/PPN based on assessed needs  - Assess need for intravenous fluids  - Provide specific nutrition/hydration education as appropriate  - Include patient/family/caregiver in decisions related to nutrition  Outcome: Progressing     Problem: Prexisting or High Potential for Compromised Skin Integrity  Goal: Skin integrity is maintained or improved  Description: INTERVENTIONS:  - Identify patients at risk for skin breakdown  - Assess and monitor skin integrity  - Assess and monitor nutrition and hydration status  - Monitor labs   - Assess for incontinence   - Turn and reposition patient  - Assist with mobility/ambulation  - Relieve pressure over bony prominences  - Avoid friction and shearing  - Provide appropriate hygiene as needed including keeping skin clean and dry  - Evaluate need for skin moisturizer/barrier cream  - Collaborate with interdisciplinary team   - Patient/family teaching  - Consider wound care consult   Outcome: Progressing     Problem: MOBILITY - ADULT  Goal: Maintain or return to baseline ADL function  Description: INTERVENTIONS:  -  Assess patient's ability to carry out ADLs; assess patient's baseline for ADL function and identify physical deficits which impact ability to perform ADLs (bathing, care of mouth/teeth, toileting, grooming, dressing, etc )  - Assess/evaluate cause of self-care deficits   - Assess range of motion  - Assess patient's mobility; develop plan if impaired  - Assess patient's need for assistive devices and provide as appropriate  - Encourage maximum independence but intervene and supervise when necessary  - Involve family in performance of ADLs  - Assess for home care needs following discharge   - Consider OT consult to assist with ADL evaluation and planning for discharge  - Provide patient education as appropriate  Outcome: Progressing  Goal: Maintains/Returns to pre admission functional level  Description: INTERVENTIONS:  - Perform BMAT or MOVE assessment daily    - Set and communicate daily mobility goal to care team and patient/family/caregiver  - Collaborate with rehabilitation services on mobility goals if consulted  - Perform Range of Motion 3 times a day  - Reposition patient every 2 hours    - Dangle patient 3 times a day  - Stand patient 3 times a day  - Ambulate patient 3 times a day  - Out of bed to chair 3 times a day   - Out of bed for meals 3 times a day  - Out of bed for toileting  - Record patient progress and toleration of activity level   Outcome: Progressing     Problem: Potential for Falls  Goal: Patient will remain free of falls  Description: INTERVENTIONS:  - Educate patient/family on patient safety including physical limitations  - Instruct patient to call for assistance with activity   - Consult OT/PT to assist with strengthening/mobility   - Keep Call bell within reach  - Keep bed low and locked with side rails adjusted as appropriate  - Keep care items and personal belongings within reach  - Initiate and maintain comfort rounds  - Make Fall Risk Sign visible to staff  - Offer Toileting every 2 Hours, in advance of need  - Initiate/Maintain bed alarm  - Obtain necessary fall risk management equipment: Alarm  - Apply yellow socks and bracelet for high fall risk patients  - Consider moving patient to room near nurses station  Outcome: Progressing     Problem: PAIN - ADULT  Goal: Verbalizes/displays adequate comfort level or baseline comfort level  Description: Interventions:  - Encourage patient to monitor pain and request assistance  - Assess pain using appropriate pain scale  - Administer analgesics based on type and severity of pain and evaluate response  - Implement non-pharmacological measures as appropriate and evaluate response  - Consider cultural and social influences on pain and pain management  - Notify physician/advanced practitioner if interventions unsuccessful or patient reports new pain  Outcome: Progressing     Problem: INFECTION - ADULT  Goal: Absence or prevention of progression during hospitalization  Description: INTERVENTIONS:  - Assess and monitor for signs and symptoms of infection  - Monitor lab/diagnostic results  - Monitor all insertion sites, i e  indwelling lines, tubes, and drains  - Monitor endotracheal if appropriate and nasal secretions for changes in amount and color  - George appropriate cooling/warming therapies per order  - Administer medications as ordered  - Instruct and encourage patient and family to use good hand hygiene technique  - Identify and instruct in appropriate isolation precautions for identified infection/condition  Outcome: Progressing  Goal: Absence of fever/infection during neutropenic period  Description: INTERVENTIONS:  - Monitor WBC    Outcome: Progressing     Problem: SAFETY ADULT  Goal: Maintain or return to baseline ADL function  Description: INTERVENTIONS:  -  Assess patient's ability to carry out ADLs; assess patient's baseline for ADL function and identify physical deficits which impact ability to perform ADLs (bathing, care of mouth/teeth, toileting, grooming, dressing, etc )  - Assess/evaluate cause of self-care deficits   - Assess range of motion  - Assess patient's mobility; develop plan if impaired  - Assess patient's need for assistive devices and provide as appropriate  - Encourage maximum independence but intervene and supervise when necessary  - Involve family in performance of ADLs  - Assess for home care needs following discharge   - Consider OT consult to assist with ADL evaluation and planning for discharge  - Provide patient education as appropriate  Outcome: Progressing  Goal: Maintains/Returns to pre admission functional level  Description: INTERVENTIONS:  - Perform BMAT or MOVE assessment daily    - Set and communicate daily mobility goal to care team and patient/family/caregiver  - Collaborate with rehabilitation services on mobility goals if consulted  - Perform Range of Motion 3 times a day  - Reposition patient every 2 hours    - Dangle patient 3 times a day  - Stand patient 3 times a day  - Ambulate patient 3 times a day  - Out of bed to chair 3 times a day   - Out of bed for meals 3 times a day  - Out of bed for toileting  - Record patient progress and toleration of activity level   Outcome: Progressing  Goal: Patient will remain free of falls  Description: INTERVENTIONS:  - Educate patient/family on patient safety including physical limitations  - Instruct patient to call for assistance with activity   - Consult OT/PT to assist with strengthening/mobility   - Keep Call bell within reach  - Keep bed low and locked with side rails adjusted as appropriate  - Keep care items and personal belongings within reach  - Initiate and maintain comfort rounds  - Make Fall Risk Sign visible to staff  - Offer Toileting every 2 Hours, in advance of need  - Initiate/Maintain bed alarm  - Obtain necessary fall risk management equipment: Alarm  - Apply yellow socks and bracelet for high fall risk patients  - Consider moving patient to room near nurses station  Outcome: Progressing     Problem: GENITOURINARY - ADULT  Goal: Maintains or returns to baseline urinary function  Description: INTERVENTIONS:  - Assess urinary function  - Encourage oral fluids to ensure adequate hydration if ordered  - Administer IV fluids as ordered to ensure adequate hydration  - Administer ordered medications as needed  - Offer frequent toileting  - Follow urinary retention protocol if ordered  Outcome: Progressing  Goal: Absence of urinary retention  Description: INTERVENTIONS:  - Assess patient’s ability to void and empty bladder  - Monitor I/O  - Bladder scan as needed  - Discuss with physician/AP medications to alleviate retention as needed  - Discuss catheterization for long term situations as appropriate  Outcome: Progressing  Goal: Urinary catheter remains patent  Description: INTERVENTIONS:  - Assess patency of urinary catheter  - If patient has a chronic wagner, consider changing catheter if non-functioning  - Follow guidelines for intermittent irrigation of non-functioning urinary catheter  Outcome: Progressing     Problem: HEMATOLOGIC - ADULT  Goal: Maintains hematologic stability  Description: INTERVENTIONS  - Assess for signs and symptoms of bleeding or hemorrhage  - Monitor labs  - Administer supportive blood products/factors as ordered and appropriate  Outcome: Progressing     Problem: MUSCULOSKELETAL - ADULT  Goal: Maintain or return mobility to safest level of function  Description: INTERVENTIONS:  - Assess patient's ability to carry out ADLs; assess patient's baseline for ADL function and identify physical deficits which impact ability to perform ADLs (bathing, care of mouth/teeth, toileting, grooming, dressing, etc )  - Assess/evaluate cause of self-care deficits   - Assess range of motion  - Assess patient's mobility  - Assess patient's need for assistive devices and provide as appropriate  - Encourage maximum independence but intervene and supervise when necessary  - Involve family in performance of ADLs  - Assess for home care needs following discharge   - Consider OT consult to assist with ADL evaluation and planning for discharge  - Provide patient education as appropriate  Outcome: Progressing  Goal: Maintain proper alignment of affected body part  Description: INTERVENTIONS:  - Support, maintain and protect limb and body alignment  - Provide patient/ family with appropriate education  Outcome: Progressing

## 2023-01-17 NOTE — ASSESSMENT & PLAN NOTE
· Patient has persistent hematuria due to bladder cancer  History of recurrent UTI on suppressive Bactrim  · CT A/P: Stable bladder wall thickening, compatible with known malignancy  Increased hyperdensity within the bladder lumen, compatible with new blood clot  · Recent admission at [facility] 12/12-12/22 for hematuria and obstructive uropathy in the setting of known bladder cancer s/p BCG and subsequent chemotherapy/radiation s/p bilateral chronic PCN tubes   Noted to have new right-sided hydronephrosis on CT s/p cystoscopy, clot evacuation w/ right ureteral stenting on 12/14 by Dr Kristan Dowd  · UA bloody, innumerable RBC/WBC, +leuks, no nitrites    Patient initially was on CBI per urology  Now transition to hand irrigation several times per day  Urology does not feel patient needs anything other than this right now    He unfortunately likely have chronic hematuria    1/16-hemoglobin stable today, monitor

## 2023-01-17 NOTE — CASE MANAGEMENT
Case Management Discharge Planning Note    Patient name Rah Henry  Location [facility] /E5 [address]-* MRN [id number]  : 1945 Date 2023       Current Admission Date: 2023  Current Admission Diagnosis:Bacteremia due to Enterobacter species   Patient Active Problem List    Diagnosis Date Noted   • Severe protein-calorie malnutrition ([facility] ) 2023   • History of pleural effusion 2023   • Nephrostomy status ([place] [address] ) 2023   • Acute on chronic blood loss anemia 2023   • Bacteremia due to Enterobacter species 2023   • Elevated brain natriuretic peptide (BNP) level 2023   • Ambulatory dysfunction 2023   • Influenza A 2023   • Elevated troponin 2023   • Right sided weakness 2022   • Stroke-like symptoms 2022   • Symptomatic hypotension 2022   • Insomnia 2022   • Right wrist drop 2022   • Chronic pleural effusion 2022   • UTI (urinary tract infection) 2022   • SOB (shortness of breath) 2022   • History of tobacco use disorder 2022   • Retroperitoneal lymphadenopathy 2022   • Pulmonary nodules 2022   • Syncope and collapse 2022   • Depression with suicidal ideation 2022   • Cancer related pain 2022   • Bilateral hydronephrosis 04/10/2022   • Stage 4 chronic kidney disease ([place] [address] ) 2022   • Fall 2022   • Hyperkalemia 2022   • Retained ureteral stent    • 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[place] [address] ) 2022   • Embolism and 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 ([place] [address] ) 2022   • Abnormal CT scan, bladder 2022   • Port-A-Cath in place 2022   • Continuous opioid dependence ([place] [address] ) 2021   • Hematuria 10/24/2021   • Acute urinary retention 10/21/2021   • Chronic pain syndrome 2021   • Lumbar spondylosis    • Chronic bronchitis ([facility] ) 2021   • Moderate major depression, single episode ([facility] ) 2021   • Thrombocytopenia ([facility] ) 03/02/2021   • 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 03/02/2021   • Gross hematuria 02/09/2021   • PAD (peripheral artery disease) ([facility])    • Left carotid bruit    • Anemia of chronic disease 12/19/2020   • Preoperative clearance 12/19/2020   • Symptomatic anemia 10/10/2020   • Diabetic ulcer of left foot associated with type 2 diabetes mellitus, 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 ([facility] ) 10/08/2020   • Acute kidney injury superimposed on CKD ([facility] )    • Dysuria 08/17/2020   • Diabetic polyneuropathy associated with type 2 diabetes mellitus ([facility] ) 07/16/2020   • Abnormal MRI, lumbar spine 06/29/2020   • Malignant neoplasm of anterior wall of urinary bladder ([facility] )    • Secondary renal hyperparathyroidism ([facility] ) 02/12/2020   • Preop examination 04/16/2019   • Superficial phlebitis 03/07/2019   • Arteriosclerosis of artery of extremity ([facility] ) 02/22/2019   • Stable angina pectoris ([facility] ) 02/22/2019   • Herpes zoster without complication 35/70/3943   • Localized edema 02/22/2019   • Back pain 01/31/2019   • Degeneration of lumbar intervertebral disc 12/03/2018   • Primary osteoarthritis of left knee 03/16/2018   • Bladder carcinoma ([facility] ) 08/16/2016   • Presence of stent in coronary artery 08/16/2016   • Hypertension with renal disease 10/29/2015   • Hyperlipidemia 10/29/2015   • Cystitis due to intravesical BCG administration 09/24/2015   • Coronary artery disease of native artery of native heart with stable angina pectoris ([facility] ) 05/19/2011   • Type 2 diabetes mellitus, with long-term current use of insulin ([facility] ) 01/09/2004      LOS (days): 10  Geometric Mean LOS (GMLOS) (days): 5 10  Days to GMLOS:-4 7     OBJECTIVE:  Risk of Unplanned Readmission Score: 67 45         Current admission status: Inpatient   Preferred Pharmacy:   [address] [place] [address]   [address] [place] [address]  Phone: [phone] Fax: [place] [facility], [place] - [address] [facility]  [address] [place] [address]  Phone: [phone] Fax: [phone]    Primary Care Provider: Ascencion White MD    Primary Insurance: MEDICARE  Secondary Insurance: BLUE CROSS    DISCHARGE DETAILS:    CM spoke with SLIM at rounds who states patient is medically clear for discharge today  CM spoke with [facility] liaison Jory Blandon who states there are no beds available today  CM expanded the acute rehab search 60 miles  Patient needs medical rehab due to hand irrigations and medical oversight  CM called [facility] to check on the home health aide referral, for assistance once patient is discharged from the rehab  CM spoke with a [facility] rep who states they will try and have the auth put through, it hasn't been done yet  CM provided patient's grand daughter Otto Lipoma with discharge planning update

## 2023-01-17 NOTE — PLAN OF CARE
Problem: OCCUPATIONAL THERAPY ADULT  Goal: Performs self-care activities at highest level of function for planned discharge setting  See evaluation for individualized goals  Description: Treatment Interventions: ADL retraining, Functional transfer training, UE strengthening/ROM, Endurance training, Patient/family training, Equipment evaluation/education, Compensatory technique education, Continued evaluation, Activityengagement          See flowsheet documentation for full assessment, interventions and recommendations  Outcome: Progressing  Note: Limitation: Decreased ADL status, Decreased UE strength, Decreased Safe judgement during ADL, Decreased endurance, Decreased self-care trans, Decreased high-level ADLs, Decreased fine motor control  Prognosis: Good  Assessment: Pt seen for OT treatment session focusing on functional activity tolerance, bed mobility, ADLs, and functional transfers/mobility  Pt alert and cooperative throughout session  Pt lying supine at start of session, completing bed mobility (supine>sit) w/ Mod A with assist for trunk support  Transfers (sit<>stand) completed w/ Mod-Min A with Mod A required to initiate forward weight shift from surface for sit>stand and Min A for controlled descent to surface for stand>sit  Pt required verbal cues for safe technique and hand placement during transitions  Min A required for functional mobility with Poor+ dynamic standing balance demonstrated w/ use of RW  Pt w/ increased dynamic standing tolerance from previous session, tolerating approx  3-4 mins of standing activity prior to needing seated rest break  Grooming tasks completed w/ Supervision w/ setup required and increased time to complete  UB dressing completed w/ Min A with assist to thread R UE into gown  Pt seated OOB in chair with chair alarm activated at end of session  Call bell and phone within reach  All needs met and pt reports no further questions for OT at this time   Continue to recommend STR when medically cleared  OT to follow pt on caseload       OT Discharge Recommendation: Post acute rehabilitation services

## 2023-01-17 NOTE — PLAN OF CARE
Problem: Nutrition/Hydration-ADULT  Goal: Nutrient/Hydration intake appropriate for improving, restoring or maintaining nutritional needs  Description: Monitor and assess patient's nutrition/hydration status for malnutrition  Collaborate with interdisciplinary team and initiate plan and interventions as ordered  Monitor patient's weight and dietary intake as ordered or per policy  Utilize nutrition screening tool and intervene as necessary  Determine patient's food preferences and provide high-protein, high-caloric foods as appropriate       INTERVENTIONS:  - Monitor oral intake, urinary output, labs, and treatment plans  - Assess nutrition and hydration status and recommend course of action  - Evaluate amount of meals eaten  - Assist patient with eating if necessary   - Allow adequate time for meals  - Recommend/ encourage appropriate diets, oral nutritional supplements, and vitamin/mineral supplements  - Order, calculate, and assess calorie counts as needed  - Recommend, monitor, and adjust tube feedings and TPN/PPN based on assessed needs  - Assess need for intravenous fluids  - Provide specific nutrition/hydration education as appropriate  - Include patient/family/caregiver in decisions related to nutrition  Outcome: Progressing     Problem: Prexisting or High Potential for Compromised Skin Integrity  Goal: Skin integrity is maintained or improved  Description: INTERVENTIONS:  - Identify patients at risk for skin breakdown  - Assess and monitor skin integrity  - Assess and monitor nutrition and hydration status  - Monitor labs   - Assess for incontinence   - Turn and reposition patient  - Assist with mobility/ambulation  - Relieve pressure over bony prominences  - Avoid friction and shearing  - Provide appropriate hygiene as needed including keeping skin clean and dry  - Evaluate need for skin moisturizer/barrier cream  - Collaborate with interdisciplinary team   - Patient/family teaching  - Consider wound care consult   Outcome: Progressing     Problem: MOBILITY - ADULT  Goal: Maintain or return to baseline ADL function  Description: INTERVENTIONS:  -  Assess patient's ability to carry out ADLs; assess patient's baseline for ADL function and identify physical deficits which impact ability to perform ADLs (bathing, care of mouth/teeth, toileting, grooming, dressing, etc )  - Assess/evaluate cause of self-care deficits   - Assess range of motion  - Assess patient's mobility; develop plan if impaired  - Assess patient's need for assistive devices and provide as appropriate  - Encourage maximum independence but intervene and supervise when necessary  - Involve family in performance of ADLs  - Assess for home care needs following discharge   - Consider OT consult to assist with ADL evaluation and planning for discharge  - Provide patient education as appropriate  Outcome: Progressing  Goal: Maintains/Returns to pre admission functional level  Description: INTERVENTIONS:  - Perform BMAT or MOVE assessment daily    - Set and communicate daily mobility goal to care team and patient/family/caregiver  - Collaborate with rehabilitation services on mobility goals if consulted  - Perform Range of Motion 3 times a day  - Reposition patient every 2 hours    - Dangle patient 3 times a day  - Stand patient 3 times a day  - Ambulate patient 3 times a day  - Out of bed to chair 3 times a day   - Out of bed for meals 3 times a day  - Out of bed for toileting  - Record patient progress and toleration of activity level   Outcome: Progressing     Problem: Potential for Falls  Goal: Patient will remain free of falls  Description: INTERVENTIONS:  - Educate patient/family on patient safety including physical limitations  - Instruct patient to call for assistance with activity   - Consult OT/PT to assist with strengthening/mobility   - Keep Call bell within reach  - Keep bed low and locked with side rails adjusted as appropriate  - Keep care items and personal belongings within reach  - Initiate and maintain comfort rounds  - Make Fall Risk Sign visible to staff  - Offer Toileting every 2 Hours, in advance of need  - Initiate/Maintain bed alarm  - Obtain necessary fall risk management equipment: Alarm  - Apply yellow socks and bracelet for high fall risk patients  - Consider moving patient to room near nurses station  Outcome: Progressing     Problem: PAIN - ADULT  Goal: Verbalizes/displays adequate comfort level or baseline comfort level  Description: Interventions:  - Encourage patient to monitor pain and request assistance  - Assess pain using appropriate pain scale  - Administer analgesics based on type and severity of pain and evaluate response  - Implement non-pharmacological measures as appropriate and evaluate response  - Consider cultural and social influences on pain and pain management  - Notify physician/advanced practitioner if interventions unsuccessful or patient reports new pain  Outcome: Progressing     Problem: INFECTION - ADULT  Goal: Absence or prevention of progression during hospitalization  Description: INTERVENTIONS:  - Assess and monitor for signs and symptoms of infection  - Monitor lab/diagnostic results  - Monitor all insertion sites, i e  indwelling lines, tubes, and drains  - Monitor endotracheal if appropriate and nasal secretions for changes in amount and color  - [place] appropriate cooling/warming therapies per order  - Administer medications as ordered  - Instruct and encourage patient and family to use good hand hygiene technique  - Identify and instruct in appropriate isolation precautions for identified infection/condition  Outcome: Progressing  Goal: Absence of fever/infection during neutropenic period  Description: INTERVENTIONS:  - Monitor WBC    Outcome: Progressing     Problem: SAFETY ADULT  Goal: Maintain or return to baseline ADL function  Description: INTERVENTIONS:  -  Assess patient's ability to carry out ADLs; assess patient's baseline for ADL function and identify physical deficits which impact ability to perform ADLs (bathing, care of mouth/teeth, toileting, grooming, dressing, etc )  - Assess/evaluate cause of self-care deficits   - Assess range of motion  - Assess patient's mobility; develop plan if impaired  - Assess patient's need for assistive devices and provide as appropriate  - Encourage maximum independence but intervene and supervise when necessary  - Involve family in performance of ADLs  - Assess for home care needs following discharge   - Consider OT consult to assist with ADL evaluation and planning for discharge  - Provide patient education as appropriate  Outcome: Progressing  Goal: Maintains/Returns to pre admission functional level  Description: INTERVENTIONS:  - Perform BMAT or MOVE assessment daily    - Set and communicate daily mobility goal to care team and patient/family/caregiver  - Collaborate with rehabilitation services on mobility goals if consulted  - Perform Range of Motion 3 times a day  - Reposition patient every 2 hours    - Dangle patient 3 times a day  - Stand patient 3 times a day  - Ambulate patient 3 times a day  - Out of bed to chair 3 times a day   - Out of bed for meals 3 times a day  - Out of bed for toileting  - Record patient progress and toleration of activity level   Outcome: Progressing  Goal: Patient will remain free of falls  Description: INTERVENTIONS:  - Educate patient/family on patient safety including physical limitations  - Instruct patient to call for assistance with activity   - Consult OT/PT to assist with strengthening/mobility   - Keep Call bell within reach  - Keep bed low and locked with side rails adjusted as appropriate  - Keep care items and personal belongings within reach  - Initiate and maintain comfort rounds  - Make Fall Risk Sign visible to staff  - Offer Toileting every 2 Hours, in advance of need  - Initiate/Maintain bed alarm  - Obtain necessary fall risk management equipment: Alarm  - Apply yellow socks and bracelet for high fall risk patients  - Consider moving patient to room near nurses station  Outcome: Progressing     Problem: GENITOURINARY - ADULT  Goal: Maintains or returns to baseline urinary function  Description: INTERVENTIONS:  - Assess urinary function  - Encourage oral fluids to ensure adequate hydration if ordered  - Administer IV fluids as ordered to ensure adequate hydration  - Administer ordered medications as needed  - Offer frequent toileting  - Follow urinary retention protocol if ordered  Outcome: Progressing  Goal: Absence of urinary retention  Description: INTERVENTIONS:  - Assess patient’s ability to void and empty bladder  - Monitor I/O  - Bladder scan as needed  - Discuss with physician/AP medications to alleviate retention as needed  - Discuss catheterization for long term situations as appropriate  Outcome: Progressing  Goal: Urinary catheter remains patent  Description: INTERVENTIONS:  - Assess patency of urinary catheter  - If patient has a chronic wagner, consider changing catheter if non-functioning  - Follow guidelines for intermittent irrigation of non-functioning urinary catheter  Outcome: Progressing     Problem: HEMATOLOGIC - ADULT  Goal: Maintains hematologic stability  Description: INTERVENTIONS  - Assess for signs and symptoms of bleeding or hemorrhage  - Monitor labs  - Administer supportive blood products/factors as ordered and appropriate  Outcome: Progressing     Problem: MUSCULOSKELETAL - ADULT  Goal: Maintain or return mobility to safest level of function  Description: INTERVENTIONS:  - Assess patient's ability to carry out ADLs; assess patient's baseline for ADL function and identify physical deficits which impact ability to perform ADLs (bathing, care of mouth/teeth, toileting, grooming, dressing, etc )  - Assess/evaluate cause of self-care deficits   - Assess range of motion  - Assess patient's mobility  - Assess patient's need for assistive devices and provide as appropriate  - Encourage maximum independence but intervene and supervise when necessary  - Involve family in performance of ADLs  - Assess for home care needs following discharge   - Consider OT consult to assist with ADL evaluation and planning for discharge  - Provide patient education as appropriate  Outcome: Progressing  Goal: Maintain proper alignment of affected body part  Description: INTERVENTIONS:  - Support, maintain and protect limb and body alignment  - Provide patient/ family with appropriate education  Outcome: Progressing

## 2023-01-17 NOTE — ASSESSMENT & PLAN NOTE
Lab Results   Component Value Date    HGBA1C 7 2 (H) 10/20/2022     · Currently receiving Lantus 15 units with sliding scale TID  · ADA diet  · Family reports allergy to humalog, so that is not being used    Recent Labs     01/16/23  0722 01/16/23  1134 01/16/23  1538 01/16/23 2047   POCGLU 139 203* 130 186*       Blood Sugar Average: Last 72 hrs:  (P) 630 [id number]

## 2023-01-17 NOTE — ASSESSMENT & PLAN NOTE
Malnutrition Findings:   Adult Malnutrition type: Chronic illness  Adult Degree of Malnutrition: Other severe protein calorie malnutrition  Malnutrition Characteristics: Weight loss, Muscle loss, Fat loss                  360 Statement: related to inadequate energy intake as evidenced by 6% weight loss 12/20/22-1/11/23, hollowing of supraclavicular space, wasted interosseous, hollowing orbital  Intervention CCD diet, medical food supplements with meals  BMI Findings: Body mass index is 24 02 kg/m²

## 2023-01-17 NOTE — ASSESSMENT & PLAN NOTE
Lab Results   Component Value Date    EGFR 18 01/17/2023    EGFR 17 01/16/2023    EGFR 17 01/14/2023    CREATININE 3 12 (H) 01/17/2023    CREATININE 3 14 (H) 01/16/2023    CREATININE 3 19 (H) 01/14/2023     · Chronic CKD 4 at baseline    Monitor BMP

## 2023-01-17 NOTE — ASSESSMENT & PLAN NOTE
· Patient tested + for influenza A prior to discharge to [facility] on [address]  · Asymptomatic therefore not treated   · Negative flu on Jan 7

## 2023-01-17 NOTE — ASSESSMENT & PLAN NOTE
· Patient was transferred from [facility] due to fever and positive blood cultures  · Blood culture growing Enterobacter  · His Port-A-Cath was thought to be the source  · Port-A-Cath removed by interventional radiology  · Repeat blood cultures no growth at 72 hours  · Spoke with ID  They are changing Cefepime to oral levaquin; patient has completed antibiotic therapy    White count trending up; will discuss with infectious disease  Patient remains afebrile, but concern for increasing leukocytosis  Loop recorder is not in blood stream and is not thought to be infected

## 2023-01-17 NOTE — ASSESSMENT & PLAN NOTE
· History of hematuria due to bladder cancer  · Hg 7 1 prior to transfer   Received 1 unit PRBC  · Iron panel showing continued deficiency-unable to utilize IV iron given bacteremia  · Seen in consult by hematology oncology    Hemoglobin 8 2 > 8 1 > 8 0 > 7 9 > 8 >7 7>8 8>9 1    Received 1 unit PRBCs -1/15, hemoglobin stable today, monitor while inpatient

## 2023-01-17 NOTE — CASE MANAGEMENT
Case Management Discharge Planning Note    Patient name Meghan Fury  Location [facility] /E5 [address] MRN [id number]  : 1945 Date 2023       Current Admission Date: 2023  Current Admission Diagnosis:Bacteremia due to Enterobacter species   Patient Active Problem List    Diagnosis Date Noted   • Severe protein-calorie malnutrition ([facility] ) 2023   • History of pleural effusion 2023   • Nephrostomy status ([place] [address] ) 2023   • Acute on chronic blood loss anemia 2023   • Bacteremia due to Enterobacter species 2023   • Elevated brain natriuretic peptide (BNP) level 2023   • Ambulatory dysfunction 2023   • Influenza A 2023   • Elevated troponin 2023   • Right sided weakness 2022   • Stroke-like symptoms 2022   • Symptomatic hypotension 2022   • Insomnia 2022   • Right wrist drop 2022   • Chronic pleural effusion 2022   • UTI (urinary tract infection) 2022   • SOB (shortness of breath) 2022   • History of tobacco use disorder 2022   • Retroperitoneal lymphadenopathy 2022   • Pulmonary nodules 2022   • Syncope and collapse 2022   • Depression with suicidal ideation 2022   • Cancer related pain 2022   • Bilateral hydronephrosis 04/10/2022   • Stage 4 chronic kidney disease ([facility] ) 2022   • Fall 2022   • Hyperkalemia 2022   • Retained ureteral stent    • 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[place] [address] ) 2022   • Embolism and 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 ([place] [address] ) 2022   • Abnormal CT scan, bladder 2022   • Port-A-Cath in place 2022   • Continuous opioid dependence ([place] [address] ) 2021   • Hematuria 10/24/2021   • Acute urinary retention 10/21/2021   • Chronic pain syndrome 2021   • Lumbar spondylosis    • Chronic bronchitis ([facility] ) 2021   • Moderate major depression, single episode ([facility] ) 2021   • Thrombocytopenia ([facility] [address] ) 03/02/2021   • 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 03/02/2021   • Gross hematuria 02/09/2021   • PAD (peripheral artery disease) ([facility])    • Left carotid bruit    • Anemia of chronic disease 12/19/2020   • Preoperative clearance 12/19/2020   • Symptomatic anemia 10/10/2020   • Diabetic ulcer of left foot associated with type 2 diabetes mellitus, 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 ([facility] [address] ) 10/08/2020   • Acute kidney injury superimposed on CKD ([facility] [address] )    • Dysuria 08/17/2020   • Diabetic polyneuropathy associated with type 2 diabetes mellitus ([facility] ) 07/16/2020   • Abnormal MRI, lumbar spine 06/29/2020   • Malignant neoplasm of anterior wall of urinary bladder ([facility] )    • Secondary renal hyperparathyroidism ([facility] ) 02/12/2020   • Preop examination 04/16/2019   • Superficial phlebitis 03/07/2019   • Arteriosclerosis of artery of extremity ([facility] [address] ) 02/22/2019   • Stable angina pectoris ([facility] ) 02/22/2019   • Herpes zoster without complication 13/23/8652   • Localized edema 02/22/2019   • Back pain 01/31/2019   • Degeneration of lumbar intervertebral disc 12/03/2018   • Primary osteoarthritis of left knee 03/16/2018   • Bladder carcinoma ([facility] ) 08/16/2016   • Presence of stent in coronary artery 08/16/2016   • Hypertension with renal disease 10/29/2015   • Hyperlipidemia 10/29/2015   • Cystitis due to intravesical BCG administration 09/24/2015   • Coronary artery disease of native artery of native heart with stable angina pectoris ([facility] ) 05/19/2011   • Type 2 diabetes mellitus, with long-term current use of insulin ([facility] ) 01/09/2004      LOS (days): 10  Geometric Mean LOS (GMLOS) (days): 5 10  Days to GMLOS:-4 7     OBJECTIVE:  Risk of Unplanned Readmission Score: 67 45         Current admission status: Inpatient   Preferred Pharmacy:   [address] [place] [address]   [address] [place] [id number]  Phone: [phone] Fax: [facility] - [address] [facility]  [address] [place] [address]  Phone: [phone] Fax: [phone]    Primary Care Provider: Radha Gil MD    Primary Insurance: MEDICARE  Secondary Insurance: BLUE CROSS    DISCHARGE DETAILS:    Good em checking to see if patient is medically clear for discharge 1/16/23  CM spoke with SLIM, SLIM advised patient is not medically clear for discharge today  CM made [facility] aware

## 2023-01-17 NOTE — PROGRESS NOTES
[facility]  Progress Note - Maycol Renee 1945, 76 y o  male MRN: [id number]  Unit/Bed#: E5 -01 Encounter: [id number]  Primary Care Provider: Albina Champion MD   Date and time admitted to hospital: 1/7/2023 10:17 PM    Severe protein-calorie malnutrition [facility])  Assessment & Plan  Malnutrition Findings:   Adult Malnutrition type: Chronic illness  Adult Degree of Malnutrition: Other severe protein calorie malnutrition  Malnutrition Characteristics: Weight loss, Muscle loss, Fat loss                  360 Statement: related to inadequate energy intake as evidenced by 6% weight loss 12/20/22-1/11/23, hollowing of supraclavicular space, wasted interosseous, hollowing orbital  Intervention CCD diet, medical food supplements with meals  BMI Findings: Body mass index is 24 02 kg/m²  History of pleural effusion  Assessment & Plan  · History of pleural effusion with pleural cath  · Reports he feels as though he has fluid in his lung again and asking for this to be drained   · Chest x-ray only showed small pleural effusion  · Thoracic surgery follow up     Acute on chronic blood loss anemia  Assessment & Plan  · History of hematuria due to bladder cancer  · Hg 7 1 prior to transfer  Received 1 unit PRBC  · Iron panel showing continued deficiency-unable to utilize IV iron given bacteremia  · Seen in consult by hematology oncology    Hemoglobin 8 2 > 8 1 > 8 0 > 7 9 > 8 >7 7>8 8>9 1    Received 1 unit PRBCs -1/15, hemoglobin stable today, monitor while inpatient    Nephrostomy status ([address] )  Assessment & Plan  · Chronic bilateral nephrostomy tubes  · Prior to transfer patient reported abdominal pain, CT was done and shows: Stable bilateral percutaneous nephrostomy tubes with interval placement of a right double-J nephroureteral stent  Stable mild right upper pole caliectasis, with resolution of previously seen lower pole caliectasis and hydroureter    Persistent blood · Appreciate urology recommendations  · Monitor I&O    Elevated brain natriuretic peptide (BNP) level  Assessment & Plan  · BNP >2000  EF 60-65%, normal wall motion  · CT small stable left pleural effusion with drainage catheter in place  · Continue lasix 20 mg daily with hold parameters  · Monitor daily weight  · Repeat echocardiogram    Influenza A  Assessment & Plan  · Patient tested + for influenza A prior to discharge to [facility] on [address]  · Asymptomatic therefore not treated   · Negative flu on Jan 7    Chronic pleural effusion  Assessment & Plan  · Chronic pleural effusion  CT showing Stable small left pleural effusion with drainage catheter in place  Stable epicardial metastasis    Stage 4 chronic kidney disease [facility])  Assessment & Plan  Lab Results   Component Value Date    EGFR 18 01/17/2023    EGFR 17 01/16/2023    EGFR 17 01/14/2023    CREATININE 3 12 (H) 01/17/2023    CREATININE 3 14 (H) 01/16/2023    CREATININE 3 19 (H) 01/14/2023     · Chronic CKD 4 at baseline  Monitor BMP    Continuous opioid dependence (HCC)  Assessment & Plan  · Maintained on oxycodone 10mg TID prn, verified on PDMP    Gross hematuria  Assessment & Plan  · Patient has persistent hematuria due to bladder cancer  History of recurrent UTI on suppressive Bactrim  · CT A/P: Stable bladder wall thickening, compatible with known malignancy  Increased hyperdensity within the bladder lumen, compatible with new blood clot  · [facility] admission [facility] 12/12-12/22 for hematuria and obstructive uropathy in the setting of known bladder cancer s/p BCG and subsequent chemotherapy/radiation s/p bilateral chronic PCN tubes   Noted to have new right-sided hydronephrosis on CT s/p cystoscopy, clot evacuation w/ right ureteral stenting on 12/14 by Dr Jocy Finn  · UA bloody, innumerable RBC/WBC, +leuks, no nitrites    Patient initially was on CBI per urology  Now transition to hand irrigation several times per day  Urology does not feel patient needs anything other than this right now    He unfortunately likely have chronic hematuria    1/16-hemoglobin stable today, monitor      Malignant neoplasm of anterior wall of urinary bladder ([facility] [address] )  Assessment & Plan  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, recurrence with CIS in 2016 BCG again  BCG refractory disease with pelvic metastases now s/p chemoradiation 2021, on current immunotherapy  · Chronic B/L PCN tubes  · CT A/P Jan 7: Stable bladder wall thickening, compatible with known malignancy  Stable metastatic retroperitoneal lymphadenopathy  Stable epicardial metastasis  · Per oncology note on Jan 6: Chemotherapy treatment should remain on hold for now untill he is clinically stable/discharged; particularly since he is positive flu  To follow-up with primary oncologist after discharge for reevaluation prior to resume treatment  · Port removed with bacteremia    Type 2 diabetes mellitus, with long-term current use of insulin ([facility] [address] )  Assessment & Plan  Lab Results   Component Value Date    HGBA1C 7 2 (H) 10/20/2022     · Currently receiving Lantus 15 units with sliding scale TID  · ADA diet  · Family reports allergy to humalog, so that is not being used    Recent Labs     01/16/23  0722 01/16/23  1134 01/16/23  1538 01/16/23  2047   POCGLU 139 203* 130 186*       Blood Sugar Average: Last 72 hrs:  (P) 507 [id number]    Coronary artery disease of native artery of native heart with stable angina pectoris (HCC)  Assessment & Plan  · Outpatient regimen ASA, Imdur, metoprolol, zetia  · Repeat echocardiogram pending    * Bacteremia due to Enterobacter species  Assessment & Plan  · Patient was transferred from [facility] due to fever and positive blood cultures  · Blood culture growing Enterobacter  · His Port-A-Cath was thought to be the source  · Port-A-Cath removed by interventional radiology  · Repeat blood cultures no growth at 72 hours  · Spoke with ID    They are changing Cefepime to oral levaquin; patient has completed antibiotic therapy    White count trending up; will discuss with infectious disease  Patient remains afebrile, but concern for increasing leukocytosis  Loop recorder is not in blood stream and is not thought to be infected  Sinus tachycardia-resolved as of 1/9/2023  Assessment & Plan  · Presented with tachycardia likely multifactorial given fever, blood loss anemia and hypotension  · On Jan 2, patient had a brief period of unresponsiveness and noted with hypotension  Blood pressure improved after holding medications: furosemide, Imdur, metoprolol and tamsulosin  · 1/6/23 seen by cardiology recommended gradually increasing metoprolol to 37 5mg twice daily and subsequently to 50 mg twice daily if his blood pressure would allow  · Continue metoprolol 25mg BID for now with hold parameters        VTE Pharmacologic Prophylaxis:   Pharmacologic: Pharmacologic VTE Prophylaxis contraindicated due to Hematuria  Mechanical VTE Prophylaxis in Place: Yes    Patient Centered Rounds: I have performed bedside rounds with nursing staff today  Discussions with Specialists or Other Care Team Provider: Infectious disease and urology    Education and Discussions with Family / Patient: Discussed treatment plan with family and patient who agree with current plan; encouraged to ask questions and participate  Time Spent for Care: 45 minutes  More than 50% of total time spent on counseling and coordination of care as described above  Current Length of Stay: 10 day(s)    Current Patient Status: Inpatient   Certification Statement: The patient will continue to require additional inpatient hospital stay due to Awaiting placement    Discharge Plan: Be determined, likely 24 to 48 hours    Code Status: Level 1 - Full Code      Subjective:   Seen and examined bedside, no acute distress or discomfort noted  Hemoglobin remained stable and manual irrigation orders reordered  Discussed with urology and they would like Shirley catheter to continue on discharge along with manual irrigation until seen in outpatient clinic  Vital signs stable and only white count seem to be trending unfavorably  14 63 today from  yesterday  Patient remains afebrile and no other signs of infection, discussed with infectious disease who reviewed patient's chart  Took last Levaquin medication yesterday and infectious disease feels elevation of leukocytes may be secondary to underlying neoplastic disease  All other lines noted to be elevated; consider volume contraction as well  Will monitor off additional antibiotics at this time  We will discussed with patient's granddaughter and await accepting facility for discharge to follow on rehabilitation  Objective:     Vitals:   Temp (24hrs), Av °F (37 2 °C), Min:98 8 °F (37 1 °C), Max:99 2 °F (37 3 °C)    Temp:  [98 8 °F (37 1 °C)-99 2 °F (37 3 °C)] 99 2 °F (37 3 °C)  HR:  [88-98] 98  Resp:  [18] 18  BP: (110-137)/(57-77) 127/57  SpO2:  [95 %-99 %] 97 %  Body mass index is 24 02 kg/m²  Input and Output Summary (last 24 hours): Intake/Output Summary (Last 24 hours) at 2023 1558  Last data filed at 2023 0615  Gross per 24 hour   Intake --   Output 2275 ml   Net -2275 ml       Physical Exam:     Physical Exam  Vitals and nursing note reviewed  Constitutional:       General: He is not in acute distress  Appearance: He is well-developed  HENT:      Head: Normocephalic and atraumatic  Eyes:      Conjunctiva/sclera: Conjunctivae normal    Cardiovascular:      Rate and Rhythm: Normal rate  Heart sounds: No murmur heard  Pulmonary:      Effort: Pulmonary effort is normal  No respiratory distress  Abdominal:      Palpations: Abdomen is soft  Tenderness: There is no abdominal tenderness  Genitourinary:     Comments: Shirley catheter in place  Musculoskeletal:         General: No swelling        Cervical back: Neck supple  Skin:     General: Skin is warm and dry  Neurological:      Mental Status: He is alert and oriented to person, place, and time  Psychiatric:         Mood and Affect: Mood normal            Additional Data:     Labs:    Results from last 7 days   Lab Units 01/17/23  0548   WBC Thousand/uL 14 63*   HEMOGLOBIN g/dL 9 1*   HEMATOCRIT % 28 8*   PLATELETS Thousands/uL 269   NEUTROS PCT % 72   LYMPHS PCT % 14   MONOS PCT % 6   EOS PCT % 7*     Results from last 7 days   Lab Units 01/17/23  0548   SODIUM mmol/L 138   POTASSIUM mmol/L 5 0   CHLORIDE mmol/L 102   CO2 mmol/L 26   BUN mg/dL 50*   CREATININE mg/dL 3 12*   ANION GAP mmol/L 10   CALCIUM mg/dL 9 7   ALBUMIN g/dL 2 3*   TOTAL BILIRUBIN mg/dL 0 19*   ALK PHOS U/L 133*   ALT U/L 47   AST U/L 39   GLUCOSE RANDOM mg/dL 139         Results from last 7 days   Lab Units 01/17/23  1526 01/17/23  1110 01/17/23  0706 01/16/23  2047 01/16/23  1538 01/16/23  1134 01/16/23  0722 01/15/23  2100 01/15/23  1533 01/15/23  1108 01/15/23  0712 01/14/23  2057   POC GLUCOSE mg/dl 207* 152* 131 186* 130 203* 139 246* 264* 106 137 155*                   * I Have Reviewed All Lab Data Listed Above  * Additional Pertinent Lab Tests Reviewed:  All Labs Within Last 24 Hours Reviewed    Imaging:    Imaging Reports Reviewed Today Include:   Imaging Personally Reviewed by Myself Includes:      Recent Cultures (last 7 days):           Last 24 Hours Medication List:   Current Facility-Administered Medications   Medication Dose Route Frequency Provider Last Rate   • acetaminophen  975 mg Oral [facility] Lawanda Monreal PA-C     • ALPRAZolam  0 25 mg Oral BID PRN Priscella EVANGELINA Magana     • ARIPiprazole  2 mg Oral Daily Priscella EVANGELINA Magana     • azelastine  1 spray Each Nare BID PRN Priscella MOE MaganaNP     • bimatoprost  1 drop Both Eyes HS Priscella EVANGELINA Magana     • calcitriol  0 25 mcg Oral Daily Priscella EVANGELINA Magana     • cyanocobalamin  1,000 mcg Oral Daily Sierra Fuss Stephanie Bhat     • diphenhydrAMINE  25 mg Oral Q6H PRN Cari Szymanski MD     • ezetimibe  10 mg Oral Daily EVANGELINA De La Paz     • furosemide  20 mg Oral Daily EVANGELINA De La Paz     • gabapentin  300 mg Oral HS EVANGELINA De La Paz     • HYDROmorphone  0 2 mg Intravenous 4x Daily PRN EVANGELINA De La Paz     • insulin glargine  15 Units Subcutaneous HS Lorna Rosen PA-C     • metoprolol tartrate  25 mg Oral Q12H [facility] [facility], EVANGELINA     • multivitamin stress formula  1 tablet Oral Daily EVANGELINA De La Paz     • ondansetron  4 mg Intravenous Q6H PRN EVANGELINA De La Paz     • oxybutynin  5 mg Oral TID EVANGELINA Arriola     • oxyCODONE  10 mg Oral TID PRN EVANGELINA De La Paz     • pantoprazole  20 mg Oral Early Morning EVANGELINA De La Paz     • polyethylene glycol  17 g Oral Daily PRN EVANGELINA De La Paz     • senna  2 tablet Oral BID PRN Carrie Mcclelland DO     • simethicone  80 mg Oral 4x Daily PRN EVANGELINA De La Paz          Today, Patient Was Seen By: Ilene Koyanagi, MD    ** Please Note: Dictation voice to text software may have been used in the creation of this document   **

## 2023-01-18 LAB
ANION GAP SERPL CALCULATED.3IONS-SCNC: 8 MMOL/L (ref 4–13)
BASOPHILS # BLD AUTO: 0.07 THOUSANDS/ÂΜL (ref 0–0.1)
BASOPHILS NFR BLD AUTO: 1 % (ref 0–1)
BUN SERPL-MCNC: 49 MG/DL (ref 5–25)
CALCIUM SERPL-MCNC: 9.2 MG/DL (ref 8.4–10.2)
CHLORIDE SERPL-SCNC: 102 MMOL/L (ref 96–108)
CO2 SERPL-SCNC: 26 MMOL/L (ref 21–32)
CREAT SERPL-MCNC: 2.85 MG/DL (ref 0.6–1.3)
EOSINOPHIL # BLD AUTO: 1.05 THOUSAND/ÂΜL (ref 0–0.61)
EOSINOPHIL NFR BLD AUTO: 8 % (ref 0–6)
ERYTHROCYTE [DISTWIDTH] IN BLOOD BY AUTOMATED COUNT: 17.4 % (ref 11.6–15.1)
GFR SERPL CREATININE-BSD FRML MDRD: 20 ML/MIN/1.73SQ M
GLUCOSE SERPL-MCNC: 110 MG/DL (ref 65–140)
GLUCOSE SERPL-MCNC: 122 MG/DL (ref 65–140)
GLUCOSE SERPL-MCNC: 139 MG/DL (ref 65–140)
GLUCOSE SERPL-MCNC: 200 MG/DL (ref 65–140)
GLUCOSE SERPL-MCNC: 228 MG/DL (ref 65–140)
HCT VFR BLD AUTO: 29.1 % (ref 36.5–49.3)
HGB BLD-MCNC: 9.1 G/DL (ref 12–17)
IMM GRANULOCYTES # BLD AUTO: 0.13 THOUSAND/UL (ref 0–0.2)
IMM GRANULOCYTES NFR BLD AUTO: 1 % (ref 0–2)
LYMPHOCYTES # BLD AUTO: 1.99 THOUSANDS/ÂΜL (ref 0.6–4.47)
LYMPHOCYTES NFR BLD AUTO: 15 % (ref 14–44)
MCH RBC QN AUTO: 28.8 PG (ref 26.8–34.3)
MCHC RBC AUTO-ENTMCNC: 31.3 G/DL (ref 31.4–37.4)
MCV RBC AUTO: 92 FL (ref 82–98)
MONOCYTES # BLD AUTO: 0.79 THOUSAND/ÂΜL (ref 0.17–1.22)
MONOCYTES NFR BLD AUTO: 6 % (ref 4–12)
NEUTROPHILS # BLD AUTO: 9.71 THOUSANDS/ÂΜL (ref 1.85–7.62)
NEUTS SEG NFR BLD AUTO: 69 % (ref 43–75)
NRBC BLD AUTO-RTO: 0 /100 WBCS
PLATELET # BLD AUTO: 263 THOUSANDS/UL (ref 149–390)
PMV BLD AUTO: 9.3 FL (ref 8.9–12.7)
POTASSIUM SERPL-SCNC: 4.6 MMOL/L (ref 3.5–5.3)
RBC # BLD AUTO: 3.16 MILLION/UL (ref 3.88–5.62)
SODIUM SERPL-SCNC: 136 MMOL/L (ref 135–147)
WBC # BLD AUTO: 13.74 THOUSAND/UL (ref 4.31–10.16)

## 2023-01-18 RX ADMIN — AMPICILLIN SODIUM 2000 MG: 2 INJECTION, POWDER, FOR SOLUTION INTRAMUSCULAR; INTRAVENOUS at 14:42

## 2023-01-18 RX ADMIN — EZETIMIBE 10 MG: 10 TABLET ORAL at 09:17

## 2023-01-18 RX ADMIN — OXYBUTYNIN CHLORIDE 5 MG: 5 TABLET ORAL at 16:06

## 2023-01-18 RX ADMIN — CALCITRIOL 0.25 MCG: 0.25 CAPSULE, LIQUID FILLED ORAL at 09:17

## 2023-01-18 RX ADMIN — OXYBUTYNIN CHLORIDE 5 MG: 5 TABLET ORAL at 09:17

## 2023-01-18 RX ADMIN — CYANOCOBALAMIN TAB 500 MCG 1000 MCG: 500 TAB at 09:17

## 2023-01-18 RX ADMIN — PANTOPRAZOLE SODIUM 20 MG: 20 TABLET, DELAYED RELEASE ORAL at 06:07

## 2023-01-18 RX ADMIN — ONDANSETRON 4 MG: 2 INJECTION INTRAMUSCULAR; INTRAVENOUS at 16:56

## 2023-01-18 RX ADMIN — METOPROLOL TARTRATE 25 MG: 25 TABLET, FILM COATED ORAL at 22:01

## 2023-01-18 RX ADMIN — FUROSEMIDE 20 MG: 20 TABLET ORAL at 09:17

## 2023-01-18 RX ADMIN — BIMATOPROST 1 DROP: 0.1 SOLUTION/ DROPS OPHTHALMIC at 22:03

## 2023-01-18 RX ADMIN — B-COMPLEX W/ C & FOLIC ACID TAB 1 TABLET: TAB at 09:17

## 2023-01-18 RX ADMIN — ONDANSETRON 4 MG: 2 INJECTION INTRAMUSCULAR; INTRAVENOUS at 10:57

## 2023-01-18 RX ADMIN — OXYCODONE HYDROCHLORIDE 10 MG: 10 TABLET ORAL at 14:47

## 2023-01-18 RX ADMIN — ARIPIPRAZOLE 2 MG: 2 TABLET ORAL at 09:17

## 2023-01-18 RX ADMIN — INSULIN GLARGINE 15 UNITS: 100 INJECTION, SOLUTION SUBCUTANEOUS at 22:02

## 2023-01-18 RX ADMIN — OXYBUTYNIN CHLORIDE 5 MG: 5 TABLET ORAL at 22:01

## 2023-01-18 RX ADMIN — METOPROLOL TARTRATE 25 MG: 25 TABLET, FILM COATED ORAL at 09:17

## 2023-01-18 RX ADMIN — GABAPENTIN 300 MG: 300 CAPSULE ORAL at 22:01

## 2023-01-18 RX ADMIN — ACETAMINOPHEN 975 MG: 325 TABLET, FILM COATED ORAL at 06:06

## 2023-01-18 NOTE — ASSESSMENT & PLAN NOTE
Lab Results   Component Value Date    HGBA1C 7 2 (H) 10/20/2022     · Currently receiving Lantus 15 units with sliding scale TID  · ADA diet  · Family reports allergy to humalog, so that is not being used    Recent Labs     01/17/23  1110 01/17/23  1526 01/17/23 2057 01/18/23  0728   POCGLU 152* 207* 332* 139       Blood Sugar Average: Last 72 hrs:  (P) 946 [id number]

## 2023-01-18 NOTE — ASSESSMENT & PLAN NOTE
· BNP >2000   EF 55%, normal wall motion  · CT small stable left pleural effusion with drainage catheter in place  · Continue lasix 20 mg daily with hold parameters  · Monitor daily weight-stable  · Repeat echocardiogram noted

## 2023-01-18 NOTE — ASSESSMENT & PLAN NOTE
· Patient tested + for influenza A prior to discharge to [facility] on [address]  · Asymptomatic therefore not treated   · Negative flu on Jan 7

## 2023-01-18 NOTE — ASSESSMENT & PLAN NOTE
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, recurrence with CIS in 2016 BCG again  BCG refractory disease with pelvic metastases now s/p chemoradiation 2021, on current immunotherapy  · Chronic B/L PCN tubes  · CT A/P Jan 7: Stable bladder wall thickening, compatible with known malignancy  Stable metastatic retroperitoneal lymphadenopathy  Stable epicardial metastasis  · Per oncology note on Jan 6: Chemotherapy treatment should remain on hold for now untill he is clinically stable/discharged; particularly since he was positive flu  To follow-up with primary oncologist after discharge for reevaluation prior to resume treatment    · Port removed 2/2 bacteremia

## 2023-01-18 NOTE — PROGRESS NOTES
[address] [facility]  Progress Note - Josee Irizarry 1945, 76 y o  male MRN: [id number]  Unit/Bed#: E5 -01 Encounter: [id number]  Primary Care Provider: Katherin Eldridge MD   Date and time admitted to hospital: 1/7/2023 10:17 PM    Severe protein-calorie malnutrition [facility])  Assessment & Plan  Malnutrition Findings:   Adult Malnutrition type: Chronic illness  Adult Degree of Malnutrition: Other severe protein calorie malnutrition  Malnutrition Characteristics: Weight loss, Muscle loss, Fat loss                  360 Statement: related to inadequate energy intake as evidenced by 6% weight loss 12/20/22-1/11/23, hollowing of supraclavicular space, wasted interosseous, hollowing orbital  Intervention CCD diet, medical food supplements with meals  BMI Findings: Body mass index is 23 83 kg/m²  History of pleural effusion  Assessment & Plan  · History of pleural effusion with pleural cath  · Reports he feels as though he has fluid in his lung again and asking for this to be drained   · Chest x-ray only showed small pleural effusion  · Thoracic surgery follow up     Acute on chronic blood loss anemia  Assessment & Plan  · History of hematuria due to bladder cancer  · Hg 7 1 prior to transfer  Received 1 unit PRBC  · Iron panel showing continued deficiency-unable to utilize IV iron given bacteremia  · Seen in consult by hematology oncology    Hemoglobin 8 2 > 8 1 > 8 0 > 7 9 > 8 >7 7>8 8>9 1    Received 1 unit PRBCs -1/15, hemoglobin stable today, monitor while inpatient    Nephrostomy status ([facility] [address] )  Assessment & Plan  · Chronic bilateral nephrostomy tubes  · Prior to transfer patient reported abdominal pain, CT was done and shows: Stable bilateral percutaneous nephrostomy tubes with interval placement of a right double-J nephroureteral stent  Stable mild right upper pole caliectasis, with resolution of previously seen lower pole caliectasis and hydroureter    Persistent blood · Appreciate urology recommendations  · Monitor I&O    Elevated brain natriuretic peptide (BNP) level  Assessment & Plan  · BNP >2000  EF 55%, normal wall motion  · CT small stable left pleural effusion with drainage catheter in place  · Continue lasix 20 mg daily with hold parameters  · Monitor daily weight-stable  · Repeat echocardiogram noted    Influenza A  Assessment & Plan  · Patient tested + for influenza A prior to discharge to [facility] on [address]  · Asymptomatic therefore not treated   · Negative flu on Jan 7    Chronic pleural effusion  Assessment & Plan  · Chronic pleural effusion  CT showing Stable small left pleural effusion with drainage catheter in place  Stable epicardial metastasis  · 1/18-chest CTA obtained for routine surveillance; did show increased fluid on left side  Patient with Pleurx catheter and nursing drained it; only 5 cc extracted  Monitor    Stage 4 chronic kidney disease [facility])  Assessment & Plan  Lab Results   Component Value Date    EGFR 20 01/18/2023    EGFR 18 01/17/2023    EGFR 17 01/16/2023    CREATININE 2 85 (H) 01/18/2023    CREATININE 3 12 (H) 01/17/2023    CREATININE 3 14 (H) 01/16/2023     · Chronic CKD 4 at baseline  Monitor BMP    Continuous opioid dependence (HCC)  Assessment & Plan  · Maintained on oxycodone 10mg TID prn, verified on PDMP    Gross hematuria  Assessment & Plan  · Patient has persistent hematuria due to bladder cancer  History of recurrent UTI on suppressive Bactrim  · CT A/P: Stable bladder wall thickening, compatible with known malignancy  Increased hyperdensity within the bladder lumen, compatible with new blood clot  · Recent admission [facility] 12/12-12/22 for hematuria and obstructive uropathy in the setting of known bladder cancer s/p BCG and subsequent chemotherapy/radiation s/p bilateral chronic PCN tubes   Noted to have new right-sided hydronephrosis on CT s/p cystoscopy, clot evacuation w/ right ureteral stenting on 12/14 by Dr Jarod Fields  · UA bloody, innumerable RBC/WBC, +leuks, no nitrites    Patient initially was on CBI per urology  Now transition to hand irrigation several times per day  Urology does not feel patient needs anything other than this right now    He unfortunately likely have chronic hematuria    1/16-hemoglobin stable today, monitor      Malignant neoplasm of anterior wall of urinary bladder ([facility] [address] )  Assessment & Plan  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, recurrence with CIS in 2016 BCG again  BCG refractory disease with pelvic metastases now s/p chemoradiation 2021, on current immunotherapy  · Chronic B/L PCN tubes  · CT A/P Jan 7: Stable bladder wall thickening, compatible with known malignancy  Stable metastatic retroperitoneal lymphadenopathy  Stable epicardial metastasis  · Per oncology note on Jan 6: Chemotherapy treatment should remain on hold for now untill he is clinically stable/discharged; particularly since he was positive flu  To follow-up with primary oncologist after discharge for reevaluation prior to resume treatment    · Port removed 2/2 bacteremia    Type 2 diabetes mellitus, with long-term current use of insulin ([facility] )  Assessment & Plan  Lab Results   Component Value Date    HGBA1C 7 2 (H) 10/20/2022     · Currently receiving Lantus 15 units with sliding scale TID  · ADA diet  · Family reports allergy to humalog, so that is not being used    Recent Labs     01/17/23  1110 01/17/23  1526 01/17/23 2057 01/18/23  0728   POCGLU 152* 207* 332* 139       Blood Sugar Average: Last 72 hrs:  (P) 899 [id number]    Coronary artery disease of native artery of native heart with stable angina pectoris (HCC)  Assessment & Plan  · Outpatient regimen ASA, Imdur, metoprolol, zetia  · Repeat echocardiogram noted; EF 55% with grade 1 diastolic dysfunction    * Bacteremia due to Enterobacter species  Assessment & Plan  · Patient was transferred from [facility] due to fever and positive blood cultures  · Blood culture growing Enterobacter  · His Port-A-Cath was thought to be the source  · Port-A-Cath removed by interventional radiology  · Repeat blood cultures no growth at 72 hours  · Spoke with ID  They are changing Cefepime to oral levaquin--->> patient initially completed antibiotic therapy as of 1/17    Loop recorder is not in blood stream and is not thought to be infected  Appreciate ID following along; have spoken with patient's family; will reinitiate amoxicillin over concerns for elevated white count  May repeat CT abdomen with contrast         VTE Pharmacologic Prophylaxis:   Pharmacologic: Pharmacologic VTE Prophylaxis contraindicated due to Active hematuria  Mechanical VTE Prophylaxis in Place: Yes    Patient Centered Rounds: I have performed bedside rounds with nursing staff today  Discussions with Specialists or Other Care Team Provider: Infectious disease    Education and Discussions with Family / Patient: Discussed treatment plan with family and patient who agree with current plan; encouraged to ask questions and participate  Time Spent for Care: 1 hour  More than 50% of total time spent on counseling and coordination of care as described above  Current Length of Stay: 11 day(s)    Current Patient Status: Inpatient   Certification Statement: The patient will continue to require additional inpatient hospital stay due to Awaiting placement    Discharge Plan: To be determined    Code Status: Level 1 - Full Code      Subjective:   Patient seen and examined bedside, no acute distress or discomfort noted  Apparently, patient's granddaughter has discussed with hospital administration her concerns about antibiotics and requested to speak with infectious disease directly  Appreciate infectious disease calling patient's granddaughterr and they have initiated ampicillin  Unclear if infection persists; leukocytosis trending down today    Some concern that underlying metastatic disease which has progressed based on CT chest may be causing underlying leukocytosis  Either way, will consider CT abdomen with contrast after reviewing chart to ensure renal function is adequate  CT chest obtained today unfortunately showed progression of liver disease as well as left pleural disease; attempted to drain Pleurx but very little fluid came out  Likely loculated effusion in that space; the patient currently saturating 96% on room air and unclear if VATS versus chest tube indicated as morbidity may outweigh any benefit  Trending trending down; continue Shirley catheter and manual irrigation  Hemoglobin stable at 9 1; will continue to monitor off oncologic DVT prophylaxis; SCDs in place  Nephrostomy tubes inspected; appear to be functioning appropriately  Follow-up with oncology in the outpatient setting for further treatment plan  Glucose levels monitored; morning blood glucose levels at goal; early evening glucose levels appear high; but as noted in chart, patient with allergy to short acting insulin  Patient stable for discharge to follow on rehab; case management aware and seeking appropriate placement at this time  Patient reports pain well controlled and looks stable on examination  Objective:     Vitals:   Temp (24hrs), Av 6 °F (37 °C), Min:97 6 °F (36 4 °C), Max:99 2 °F (37 3 °C)    Temp:  [97 6 °F (36 4 °C)-99 2 °F (37 3 °C)] 97 6 °F (36 4 °C)  HR:  [78-98] 80  Resp:  [18] 18  BP: ()/(55-77) 136/77  SpO2:  [96 %-98 %] 96 %  Body mass index is 23 83 kg/m²  Input and Output Summary (last 24 hours):        Intake/Output Summary (Last 24 hours) at 2023 1357  Last data filed at 2023 0901  Gross per 24 hour   Intake 10 ml   Output 2600 ml   Net -2590 ml       Physical Exam:     Physical Exam      Additional Data:     Labs:    Results from last 7 days   Lab Units 23  1008   WBC Thousand/uL 13 74*   HEMOGLOBIN g/dL 9 1*   HEMATOCRIT % 29 1*   PLATELETS Thousands/uL 263   NEUTROS PCT % 69   LYMPHS PCT % 15   MONOS PCT % 6   EOS PCT % 8*     Results from last 7 days   Lab Units 01/18/23  0639 01/17/23  0548   SODIUM mmol/L 136 138   POTASSIUM mmol/L 4 6 5 0   CHLORIDE mmol/L 102 102   CO2 mmol/L 26 26   BUN mg/dL 49* 50*   CREATININE mg/dL 2 85* 3 12*   ANION GAP mmol/L 8 10   CALCIUM mg/dL 9 2 9 7   ALBUMIN g/dL  --  2 3*   TOTAL BILIRUBIN mg/dL  --  0 19*   ALK PHOS U/L  --  133*   ALT U/L  --  47   AST U/L  --  39   GLUCOSE RANDOM mg/dL 122 139         Results from last 7 days   Lab Units 01/18/23  1124 01/18/23  0728 01/17/23  2057 01/17/23  1526 01/17/23  1110 01/17/23  0706 01/16/23  2047 01/16/23  1538 01/16/23  1134 01/16/23  0722 01/15/23  2100 01/15/23  1533   POC GLUCOSE mg/dl 110 139 332* 207* 152* 131 186* 130 203* 139 246* 264*                   * I Have Reviewed All Lab Data Listed Above  * Additional Pertinent Lab Tests Reviewed:  All Labs Within Last 24 Hours Reviewed    Imaging:    Imaging Reports Reviewed Today Include: CT chest  Imaging Personally Reviewed by Myself Includes:      Recent Cultures (last 7 days):           Last 24 Hours Medication List:   Current Facility-Administered Medications   Medication Dose Route Frequency Provider Last Rate   • ALPRAZolam  0 25 mg Oral BID PRN EVANGELINA Briones     • ampicillin  2,000 mg Intravenous Q12H Cherelle Blue MD     • ARIPiprazole  2 mg Oral Daily EVANGELINA Briones     • azelastine  1 spray Each Nare BID PRN EVANGELINA Briones     • bimatoprost  1 drop Both Eyes HS EVANGELINA Briones     • calcitriol  0 25 mcg Oral Daily EVANGELINA Briones     • cyanocobalamin  1,000 mcg Oral Daily EVANGELINA Briones     • diphenhydrAMINE  25 mg Oral Q6H PRN Leo Shaw MD     • ezetimibe  10 mg Oral Daily EVANGELINA Briones     • furosemide  20 mg Oral Daily EVANGELINA Briones     • gabapentin  300 mg Oral HS EVANGELINA Briones     • HYDROmorphone  0 2 mg Intravenous 4x Daily PRN Adarsh Mcneal, CRNP     • insulin glargine  15 Units Subcutaneous HS Lorna Wolf Parker PA-C     • metoprolol tartrate  25 mg Oral Q12H [facility] [facility], EVANGELINA     • multivitamin stress formula  1 tablet Oral Daily Adarsh Mcneal, CRNP     • ondansetron  4 mg Intravenous Q6H PRN MOE RoeNP     • oxybutynin  5 mg Oral TID EVANGELINA Lu     • oxyCODONE  10 mg Oral TID PRN EVANGELINA Roe     • pantoprazole  20 mg Oral Early Morning Adarsh Mcneal, MOENP     • polyethylene glycol  17 g Oral Daily PRN MOE RoeNP     • senna  2 tablet Oral BID PRN Luca Mcclelland DO     • simethicone  80 mg Oral 4x Daily PRN EVANGELINA Roe          Today, Patient Was Seen By: Michael Powers MD    ** Please Note: Dictation voice to text software may have been used in the creation of this document   **

## 2023-01-18 NOTE — PHYSICAL THERAPY NOTE
Physical Therapy Cancellation Note    PT session cancelled as patient refused reporting nausea and he has been vomiting a little  RN confirmed previously that patient was nauseous  Will continue to follow as able

## 2023-01-18 NOTE — PROGRESS NOTES
Progress Note - Infectious Disease   Sommer Jung 76 y o  male MRN: [id number]  Unit/Bed#: E5 -01 Encounter: [id number]      Impression/Plan:  1  Persistent leukocytosis  Patient recently seen by our service and treated for problem 5  He continues to have mild fluctuating leukocytosis with peripheral eosinophilia  Suspicion remains that this is inflammatory in nature given problem 2  Consider also medication side effect given peripheral eosinophilia  Detailed discussion with patient's granddaughter and they would like to pursue/attempt treatment of Enterococcus previously seen in the urine to assure that there is no ongoing/persistent infection  Patient is otherwise hemodynamically stable but chronically ill and frail  At this time we will start on ampicillin 2 g every 12 hours, renally dosed  Continue to trend fever curve/vitals  Repeat CBC and chemistry ordered for tomorrow  Tylenol discontinued after discussion with family  Hold off on repeat urine culture given prior treatments  Urology evaluation appreciated and continue irrigation  Recommend CT imaging of the abdomen without contrast given 3  Monitor for any progressive symptoms  Recommend detailed review of patient's other medications  Would obtain blood cultures if patient develops fever  Additional supportive care as per primary  Duration of therapy pending clinical course    2  Metastatic high-grade treatment refractory bladder cancer  Patient unfortunately with progressive metastatic disease now also seen on CT imaging  Also having chronic/persistent hematuria  I suspect that this is contributing to the above  Antibiotic as above for now  Recommend follow-up with urology as outpatient  Recommend follow-up with oncology as outpatient  Continue to trend fever curve/vitals  Repeat labs ordered for tomorrow    3  Chronic kidney disease  Significant kidney dysfunction at baseline    Creatinine clearance calculated and this does affect antibiotic dosing  Ampicillin dosing as above  Repeat chemistry tomorrow  We will further dose adjust as needed  Fluid hydration as per primary  Avoid nephrotoxic agents    4  Acute on chronic anemia  This is related to problem #2  Ongoing hemoglobin monitoring and transfusional support as per primary    5  Recently treated Enterobacter bacteremia  Patient completed treatment today  Levaquin which was given also covered ESBL E  coli that had previously been isolated in the patient's urine  Plans for targeted therapy towards Enterococcus as above  Above plan discussed in detail with the patient, primary service and patient's granddaughter  ID consult service will continue to follow  I have spent 50 minutes with Patient/family, other providers and in review of records today in completing this visit  Total time spent included review of testing, ordering medications and tests, communicating with other providers, documentation time and counseling/providing education to patient, family, caregiver and coordination of outpatient care as detailed in my note  Antibiotics:  Ampicillin 1    Subjective:  Contacted by primary service attending to discuss patient's case along with continued leukocytosis  Patient's granddaughter requesting further discussion and clarifications  We are reconsulted then to reevaluate the patient  Had detailed discussion with patient's granddaughter and reviewed antibiotics administered as well as duration of therapy and current treatments  Ultimately she expressed her ongoing concern for possibly missing ongoing/persistent infection given the patient's white count and the fact that he continues to isolate Enterococcus in the urine  I did express my concern that patient's white count particularly given the eosinophil predominance may possibly be inflammatory in nature and more so related to ongoing intermittent bleeding in the catheter as well as progression of his tumors    We ultimately agreed on attempt a trial of therapy, repeat imaging and monitoring to see if patient further improves  On my evaluation at the bedside the patient reports some nausea and vomiting this morning  Nursing reports that he ate breakfast without issue  Currently treating with Zofran  Denies significant spasms but did not also realize that he was having more bleeding again into his catheter  We discussed the plans to restart antibiotic therapy  Previous port site has healed  I did also briefly discussed with the patient's granddaughter that regardless of current therapies he remains at high risk for recurrence and relapse and representation given the multitude of issues involving his urinary tract  She expressed understanding  Objective:  Vitals:  Temp:  [97 6 °F (36 4 °C)-99 2 °F (37 3 °C)] 97 6 °F (36 4 °C)  HR:  [78-98] 80  Resp:  [18] 18  BP: ()/(55-77) 136/77  SpO2:  [96 %-98 %] 96 %  Temp (24hrs), Av 6 °F (37 °C), Min:97 6 °F (36 4 °C), Max:99 2 °F (37 3 °C)  Current: Temperature: 97 6 °F (36 4 °C)    Physical Exam:   General Appearance:   Chronically ill-appearing, frail, nontoxic and in no acute distress   Throat: Oropharynx moist without lesions  Lungs:   Clear to auscultation bilaterally; no wheezes, rhonchi or rales; respirations unlabored on room air   Heart:  RRR; no murmur, rub or gallop appreciated   Abdomen:   Soft, non-tender, non-distended, positive bowel sounds  Shirley catheter again with bloody drainage  Extremities: No clubbing, cyanosis or edema   Skin: No new rashes or lesions  No new draining wounds noted  Previous port site has healed  Labs, Imaging, & Other studies:   All pertinent labs and imaging studies were personally reviewed as below    Results from last 7 days   Lab Units 23  1008 23  0548 23  0611   WBC Thousand/uL 13 74* 14 63* 13 09*   HEMOGLOBIN g/dL 9 1* 9 1* 8 8*   PLATELETS Thousands/uL 263 269 253     Results from last 7 days   Lab Units 01/18/23  0639 01/17/23  0548 01/13/23  0531 01/12/23  0541   POTASSIUM mmol/L 4 6 5 0   < > 4 7   CHLORIDE mmol/L 102 102   < > 105   CO2 mmol/L 26 26   < > 26   BUN mg/dL 49* 50*   < > 55*   CREATININE mg/dL 2 85* 3 12*   < > 3 12*   EGFR ml/min/1 73sq m 20 18   < > 18   CALCIUM mg/dL 9 2 9 7   < > 9 5   AST U/L  --  39  --  28   ALT U/L  --  47  --  23   ALK PHOS U/L  --  133*  --  133*    < > = values in this interval not displayed  Lab interpretation/comments: White count remains elevated and differential primarily with eosinophils  No significant neutrophil predominance  Imaging interpretation/comments: Repeat CT chest reviewed which shows progressing metastatic disease  Culture data: Recent culture data reviewed extensively with patient's granddaughter over the phone

## 2023-01-18 NOTE — CASE MANAGEMENT
Case Management Discharge Planning Note    Patient name Danita Deleon /E5 [address]-* MRN [id number]  : 1945 Date 2023       Current Admission Date: 2023  Current Admission Diagnosis:Bacteremia due to Enterobacter species   Patient Active Problem List    Diagnosis Date Noted   • Severe protein-calorie malnutrition ([facility] ) 2023   • History of pleural effusion 2023   • Nephrostomy status ([place] [address] ) 2023   • Acute on chronic blood loss anemia 2023   • Bacteremia due to Enterobacter species 2023   • Elevated brain natriuretic peptide (BNP) level 2023   • Ambulatory dysfunction 2023   • Influenza A 2023   • Elevated troponin 2023   • Right sided weakness 2022   • Stroke-like symptoms 2022   • Symptomatic hypotension 2022   • Insomnia 2022   • Right wrist drop 2022   • Chronic pleural effusion 2022   • UTI (urinary tract infection) 2022   • SOB (shortness of breath) 2022   • History of tobacco use disorder 2022   • Retroperitoneal lymphadenopathy 2022   • Pulmonary nodules 2022   • Syncope and collapse 2022   • Depression with suicidal ideation 2022   • Cancer related pain 2022   • Bilateral hydronephrosis 04/10/2022   • Stage 4 chronic kidney disease ([place] [address] ) 2022   • Fall 2022   • Hyperkalemia 2022   • Retained ureteral stent 8624   • 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[place] [address] ) 2022   • Embolism and 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 ([place] [address] ) 2022   • Abnormal CT scan, bladder 2022   • Port-A-Cath in place 2022   • Continuous opioid dependence ([place] [address] ) 2021   • Hematuria 10/24/2021   • Acute urinary retention 10/21/2021   • Chronic pain syndrome 2021   • Lumbar spondylosis    • Chronic bronchitis ([facility] ) 2021   • Moderate major depression, single episode ([facility] ) 2021   • Thrombocytopenia ([facility] ) 03/02/2021   • 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 03/02/2021   • Gross hematuria 02/09/2021   • PAD (peripheral artery disease) ([facility])    • Left carotid bruit    • Anemia of chronic disease 12/19/2020   • Preoperative clearance 12/19/2020   • Symptomatic anemia 10/10/2020   • Diabetic ulcer of left foot associated with type 2 diabetes mellitus, 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 ([facility] ) 10/08/2020   • Acute kidney injury superimposed on CKD ([facility] )    • Dysuria 08/17/2020   • Diabetic polyneuropathy associated with type 2 diabetes mellitus ([address] ) 07/16/2020   • Abnormal MRI, lumbar spine 06/29/2020   • Malignant neoplasm of anterior wall of urinary bladder ([facility] )    • Secondary renal hyperparathyroidism ([facility] ) 02/12/2020   • Preop examination 04/16/2019   • Superficial phlebitis 03/07/2019   • Arteriosclerosis of artery of extremity ([facility] ) 02/22/2019   • Stable angina pectoris ([facility] ) 02/22/2019   • Herpes zoster without complication 97/28/8299   • Localized edema 02/22/2019   • Back pain 01/31/2019   • Degeneration of lumbar intervertebral disc 12/03/2018   • Primary osteoarthritis of left knee 03/16/2018   • Bladder carcinoma ([facility] ) 08/16/2016   • Presence of stent in coronary artery 08/16/2016   • Hypertension with renal disease 10/29/2015   • Hyperlipidemia 10/29/2015   • Cystitis due to intravesical BCG administration 09/24/2015   • Coronary artery disease of native artery of native heart with stable angina pectoris ([facility] ) 05/19/2011   • Type 2 diabetes mellitus, with long-term current use of insulin ([facility] ) 01/09/2004      LOS (days): 11  Geometric Mean LOS (GMLOS) (days): 5 10  Days to GMLOS:-5 5     OBJECTIVE:  Risk of Unplanned Readmission Score: 67 77         Current admission status: Inpatient   Preferred Pharmacy:   [address] [place] [address]   [address] [place] [address] [place]  Phone: [phone] Fax: [place], [place] - [address] [facility]  [address] [place] [address]  Phone: [phone] Fax: [phone]    Primary Care Provider: Cali Barajas MD    Primary Insurance: MEDICARE  Secondary Insurance: BLUE CROSS    DISCHARGE DETAILS:    No bed available at [address] [place] [address] today per Justin Grewal, the liaison, and recommends that CM search elsewhere  CM spoke with liaison at [facility]'s rehab who states they will look into the referral and answer back on Aidin with a determination  CM made referrals to [facility] in the area as well  CM received a call from the [place] clinic, while in rounds, who states the [place] is working on getting home health aides in the home  CM will continue to follow

## 2023-01-18 NOTE — ASSESSMENT & PLAN NOTE
Lab Results   Component Value Date    EGFR 20 01/18/2023    EGFR 18 01/17/2023    EGFR 17 01/16/2023    CREATININE 2 85 (H) 01/18/2023    CREATININE 3 12 (H) 01/17/2023    CREATININE 3 14 (H) 01/16/2023     · Chronic CKD 4 at baseline    Monitor BMP

## 2023-01-18 NOTE — ASSESSMENT & PLAN NOTE
· Patient has persistent hematuria due to bladder cancer  History of recurrent UTI on suppressive Bactrim  · CT A/P: Stable bladder wall thickening, compatible with known malignancy  Increased hyperdensity within the bladder lumen, compatible with new blood clot  · Recent admission at [facility] 12/12-12/22 for hematuria and obstructive uropathy in the setting of known bladder cancer s/p BCG and subsequent chemotherapy/radiation s/p bilateral chronic PCN tubes   Noted to have new right-sided hydronephrosis on CT s/p cystoscopy, clot evacuation w/ right ureteral stenting on 12/14 by Dr Bright Miguel  · UA bloody, innumerable RBC/WBC, +leuks, no nitrites    Patient initially was on CBI per urology  Now transition to hand irrigation several times per day  Urology does not feel patient needs anything other than this right now    He unfortunately likely have chronic hematuria    1/16-hemoglobin stable today, monitor

## 2023-01-18 NOTE — PLAN OF CARE
Problem: Nutrition/Hydration-ADULT  Goal: Nutrient/Hydration intake appropriate for improving, restoring or maintaining nutritional needs  Description: Monitor and assess patient's nutrition/hydration status for malnutrition  Collaborate with interdisciplinary team and initiate plan and interventions as ordered  Monitor patient's weight and dietary intake as ordered or per policy  Utilize nutrition screening tool and intervene as necessary  Determine patient's food preferences and provide high-protein, high-caloric foods as appropriate       INTERVENTIONS:  - Monitor oral intake, urinary output, labs, and treatment plans  - Assess nutrition and hydration status and recommend course of action  - Evaluate amount of meals eaten  - Assist patient with eating if necessary   - Allow adequate time for meals  - Recommend/ encourage appropriate diets, oral nutritional supplements, and vitamin/mineral supplements  - Order, calculate, and assess calorie counts as needed  - Recommend, monitor, and adjust tube feedings and TPN/PPN based on assessed needs  - Assess need for intravenous fluids  - Provide specific nutrition/hydration education as appropriate  - Include patient/family/caregiver in decisions related to nutrition  1/18/2023 0250 by Julia Molina RN  Outcome: Progressing  1/18/2023 0250 by Julia Molina RN  Outcome: Progressing     Problem: Prexisting or High Potential for Compromised Skin Integrity  Goal: Skin integrity is maintained or improved  Description: INTERVENTIONS:  - Identify patients at risk for skin breakdown  - Assess and monitor skin integrity  - Assess and monitor nutrition and hydration status  - Monitor labs   - Assess for incontinence   - Turn and reposition patient  - Assist with mobility/ambulation  - Relieve pressure over bony prominences  - Avoid friction and shearing  - Provide appropriate hygiene as needed including keeping skin clean and dry  - Evaluate need for skin moisturizer/barrier cream  - Collaborate with interdisciplinary team   - Patient/family teaching  - Consider wound care consult   1/18/2023 0250 by Anika Kaufman RN  Outcome: Progressing  1/18/2023 0250 by Anika Kaufman RN  Outcome: Progressing     Problem: MOBILITY - ADULT  Goal: Maintain or return to baseline ADL function  Description: INTERVENTIONS:  -  Assess patient's ability to carry out ADLs; assess patient's baseline for ADL function and identify physical deficits which impact ability to perform ADLs (bathing, care of mouth/teeth, toileting, grooming, dressing, etc )  - Assess/evaluate cause of self-care deficits   - Assess range of motion  - Assess patient's mobility; develop plan if impaired  - Assess patient's need for assistive devices and provide as appropriate  - Encourage maximum independence but intervene and supervise when necessary  - Involve family in performance of ADLs  - Assess for home care needs following discharge   - Consider OT consult to assist with ADL evaluation and planning for discharge  - Provide patient education as appropriate  1/18/2023 0250 by Anika Kaufman RN  Outcome: Progressing  1/18/2023 0250 by Anika Kaufman RN  Outcome: Progressing  Goal: Maintains/Returns to pre admission functional level  Description: INTERVENTIONS:  - Perform BMAT or MOVE assessment daily    - Set and communicate daily mobility goal to care team and patient/family/caregiver  - Collaborate with rehabilitation services on mobility goals if consulted  - Perform Range of Motion 3 times a day  - Reposition patient every 2 hours    - Dangle patient 3 times a day  - Stand patient 3 times a day  - Ambulate patient 3 times a day  - Out of bed to chair 3 times a day   - Out of bed for meals 3 times a day  - Out of bed for toileting  - Record patient progress and toleration of activity level   1/18/2023 0250 by Anika Kaufman RN  Outcome: Progressing  1/18/2023 0250 by Anika Kaufman RN  Outcome: Progressing     Problem: Potential for Falls  Goal: Patient will remain free of falls  Description: INTERVENTIONS:  - Educate patient/family on patient safety including physical limitations  - Instruct patient to call for assistance with activity   - Consult OT/PT to assist with strengthening/mobility   - Keep Call bell within reach  - Keep bed low and locked with side rails adjusted as appropriate  - Keep care items and personal belongings within reach  - Initiate and maintain comfort rounds  - Make Fall Risk Sign visible to staff  - Offer Toileting every 2 Hours, in advance of need  - Initiate/Maintain bed alarm  - Obtain necessary fall risk management equipment: Alarm  - Apply yellow socks and bracelet for high fall risk patients  - Consider moving patient to room near nurses station  1/18/2023 0250 by Nena Aquino RN  Outcome: Dinah Bruce  1/18/2023 0250 by Nena Aquino RN  Outcome: Progressing     Problem: PAIN - ADULT  Goal: Verbalizes/displays adequate comfort level or baseline comfort level  Description: Interventions:  - Encourage patient to monitor pain and request assistance  - Assess pain using appropriate pain scale  - Administer analgesics based on type and severity of pain and evaluate response  - Implement non-pharmacological measures as appropriate and evaluate response  - Consider cultural and social influences on pain and pain management  - Notify physician/advanced practitioner if interventions unsuccessful or patient reports new pain  1/18/2023 0250 by Nena Aquino RN  Outcome: Progressing  1/18/2023 0250 by Nena Aquino RN  Outcome: Progressing     Problem: INFECTION - ADULT  Goal: Absence or prevention of progression during hospitalization  Description: INTERVENTIONS:  - Assess and monitor for signs and symptoms of infection  - Monitor lab/diagnostic results  - Monitor all insertion sites, i e  indwelling lines, tubes, and drains  - Monitor endotracheal if appropriate and nasal secretions for changes in amount and color  - [place] appropriate cooling/warming therapies per order  - Administer medications as ordered  - Instruct and encourage patient and family to use good hand hygiene technique  - Identify and instruct in appropriate isolation precautions for identified infection/condition  1/18/2023 0250 by Tobias Phillips RN  Outcome: Progressing  1/18/2023 0250 by Tobias Phillips RN  Outcome: Progressing  Goal: Absence of fever/infection during neutropenic period  Description: INTERVENTIONS:  - Monitor WBC    1/18/2023 0250 by Tobias Phillips RN  Outcome: Progressing  1/18/2023 0250 by Tobias Phillips RN  Outcome: Progressing     Problem: SAFETY ADULT  Goal: Maintain or return to baseline ADL function  Description: INTERVENTIONS:  -  Assess patient's ability to carry out ADLs; assess patient's baseline for ADL function and identify physical deficits which impact ability to perform ADLs (bathing, care of mouth/teeth, toileting, grooming, dressing, etc )  - Assess/evaluate cause of self-care deficits   - Assess range of motion  - Assess patient's mobility; develop plan if impaired  - Assess patient's need for assistive devices and provide as appropriate  - Encourage maximum independence but intervene and supervise when necessary  - Involve family in performance of ADLs  - Assess for home care needs following discharge   - Consider OT consult to assist with ADL evaluation and planning for discharge  - Provide patient education as appropriate  1/18/2023 0250 by Tobias Phillips RN  Outcome: Progressing  1/18/2023 0250 by Tobias Phillips RN  Outcome: Progressing  Goal: Maintains/Returns to pre admission functional level  Description: INTERVENTIONS:  - Perform BMAT or MOVE assessment daily    - Set and communicate daily mobility goal to care team and patient/family/caregiver  - Collaborate with rehabilitation services on mobility goals if consulted  - Perform Range of Motion 3 times a day  - Reposition patient every 2 hours    - Dangle patient 3 times a day  - Stand patient 3 times a day  - Ambulate patient 3 times a day  - Out of bed to chair 3 times a day   - Out of bed for meals 3 times a day  - Out of bed for toileting  - Record patient progress and toleration of activity level   1/18/2023 0250 by Shannan Mcdonald RN  Outcome: Progressing  1/18/2023 0250 by Shannan Mcdonald RN  Outcome: Progressing  Goal: Patient will remain free of falls  Description: INTERVENTIONS:  - Educate patient/family on patient safety including physical limitations  - Instruct patient to call for assistance with activity   - Consult OT/PT to assist with strengthening/mobility   - Keep Call bell within reach  - Keep bed low and locked with side rails adjusted as appropriate  - Keep care items and personal belongings within reach  - Initiate and maintain comfort rounds  - Make Fall Risk Sign visible to staff  - Offer Toileting every 2 Hours, in advance of need  - Initiate/Maintain bed alarm  - Obtain necessary fall risk management equipment: Alarm  - Apply yellow socks and bracelet for high fall risk patients  - Consider moving patient to room near nurses station  1/18/2023 0250 by Shannan Mcdonald RN  Outcome: Progressing  1/18/2023 0250 by Shannan Mcdonald RN  Outcome: Progressing     Problem: GENITOURINARY - ADULT  Goal: Maintains or returns to baseline urinary function  Description: INTERVENTIONS:  - Assess urinary function  - Encourage oral fluids to ensure adequate hydration if ordered  - Administer IV fluids as ordered to ensure adequate hydration  - Administer ordered medications as needed  - Offer frequent toileting  - Follow urinary retention protocol if ordered  1/18/2023 0250 by Shannan Mcdonald RN  Outcome: Progressing  1/18/2023 0250 by Shannan Mcdonald RN  Outcome: Progressing  Goal: Absence of urinary retention  Description: INTERVENTIONS:  - Assess patient’s ability to void and empty bladder  - Monitor I/O  - Bladder scan as needed  - Discuss with physician/AP medications to alleviate retention as needed  - Discuss catheterization for long term situations as appropriate  1/18/2023 0250 by Deann Oro RN  Outcome: Progressing  1/18/2023 0250 by Deann Oro RN  Outcome: Progressing  Goal: Urinary catheter remains patent  Description: INTERVENTIONS:  - Assess patency of urinary catheter  - If patient has a chronic wagner, consider changing catheter if non-functioning  - Follow guidelines for intermittent irrigation of non-functioning urinary catheter  1/18/2023 0250 by Deann Oro RN  Outcome: Progressing  1/18/2023 0250 by Deann Oro RN  Outcome: Progressing     Problem: HEMATOLOGIC - ADULT  Goal: Maintains hematologic stability  Description: INTERVENTIONS  - Assess for signs and symptoms of bleeding or hemorrhage  - Monitor labs  - Administer supportive blood products/factors as ordered and appropriate  1/18/2023 0250 by Deann Oro RN  Outcome: Progressing  1/18/2023 0250 by Deann Oro RN  Outcome: Progressing     Problem: MUSCULOSKELETAL - ADULT  Goal: Maintain or return mobility to safest level of function  Description: INTERVENTIONS:  - Assess patient's ability to carry out ADLs; assess patient's baseline for ADL function and identify physical deficits which impact ability to perform ADLs (bathing, care of mouth/teeth, toileting, grooming, dressing, etc )  - Assess/evaluate cause of self-care deficits   - Assess range of motion  - Assess patient's mobility  - Assess patient's need for assistive devices and provide as appropriate  - Encourage maximum independence but intervene and supervise when necessary  - Involve family in performance of ADLs  - Assess for home care needs following discharge   - Consider OT consult to assist with ADL evaluation and planning for discharge  - Provide patient education as appropriate  1/18/2023 0250 by Deann Oro RN  Outcome: Progressing  1/18/2023 0250 by Deann Oro RN  Outcome: Progressing  Goal: Maintain proper alignment of affected body part  Description: INTERVENTIONS:  - Support, maintain and protect limb and body alignment  - Provide patient/ family with appropriate education  1/18/2023 0250 by Charlet Nageotte, RN  Outcome: Progressing  1/18/2023 0250 by Charlet Nageotte, RN  Outcome: Progressing

## 2023-01-18 NOTE — ASSESSMENT & PLAN NOTE
· Outpatient regimen ASA, Imdur, metoprolol, zetia  · Repeat echocardiogram noted; EF 55% with grade 1 diastolic dysfunction

## 2023-01-18 NOTE — ASSESSMENT & PLAN NOTE
· Chronic pleural effusion  CT showing Stable small left pleural effusion with drainage catheter in place  Stable epicardial metastasis  · 1/18-chest CTA obtained for routine surveillance; did show increased fluid on left side  Patient with Pleurx catheter and nursing drained it; only 5 cc extracted    Monitor

## 2023-01-18 NOTE — ASSESSMENT & PLAN NOTE
Malnutrition Findings:   Adult Malnutrition type: Chronic illness  Adult Degree of Malnutrition: Other severe protein calorie malnutrition  Malnutrition Characteristics: Weight loss, Muscle loss, Fat loss                  360 Statement: related to inadequate energy intake as evidenced by 6% weight loss 12/20/22-1/11/23, hollowing of supraclavicular space, wasted interosseous, hollowing orbital  Intervention CCD diet, medical food supplements with meals  BMI Findings: Body mass index is 23 83 kg/m²

## 2023-01-18 NOTE — PLAN OF CARE
Problem: Nutrition/Hydration-ADULT  Goal: Nutrient/Hydration intake appropriate for improving, restoring or maintaining nutritional needs  Description: Monitor and assess patient's nutrition/hydration status for malnutrition  Collaborate with interdisciplinary team and initiate plan and interventions as ordered  Monitor patient's weight and dietary intake as ordered or per policy  Utilize nutrition screening tool and intervene as necessary  Determine patient's food preferences and provide high-protein, high-caloric foods as appropriate       INTERVENTIONS:  - Monitor oral intake, urinary output, labs, and treatment plans  - Assess nutrition and hydration status and recommend course of action  - Evaluate amount of meals eaten  - Assist patient with eating if necessary   - Allow adequate time for meals  - Recommend/ encourage appropriate diets, oral nutritional supplements, and vitamin/mineral supplements  - Order, calculate, and assess calorie counts as needed  - Recommend, monitor, and adjust tube feedings and TPN/PPN based on assessed needs  - Assess need for intravenous fluids  - Provide specific nutrition/hydration education as appropriate  - Include patient/family/caregiver in decisions related to nutrition  Outcome: Progressing     Problem: Prexisting or High Potential for Compromised Skin Integrity  Goal: Skin integrity is maintained or improved  Description: INTERVENTIONS:  - Identify patients at risk for skin breakdown  - Assess and monitor skin integrity  - Assess and monitor nutrition and hydration status  - Monitor labs   - Assess for incontinence   - Turn and reposition patient  - Assist with mobility/ambulation  - Relieve pressure over bony prominences  - Avoid friction and shearing  - Provide appropriate hygiene as needed including keeping skin clean and dry  - Evaluate need for skin moisturizer/barrier cream  - Collaborate with interdisciplinary team   - Patient/family teaching  - Consider wound care consult   Outcome: Progressing     Problem: MOBILITY - ADULT  Goal: Maintain or return to baseline ADL function  Description: INTERVENTIONS:  -  Assess patient's ability to carry out ADLs; assess patient's baseline for ADL function and identify physical deficits which impact ability to perform ADLs (bathing, care of mouth/teeth, toileting, grooming, dressing, etc )  - Assess/evaluate cause of self-care deficits   - Assess range of motion  - Assess patient's mobility; develop plan if impaired  - Assess patient's need for assistive devices and provide as appropriate  - Encourage maximum independence but intervene and supervise when necessary  - Involve family in performance of ADLs  - Assess for home care needs following discharge   - Consider OT consult to assist with ADL evaluation and planning for discharge  - Provide patient education as appropriate  Outcome: Progressing  Goal: Maintains/Returns to pre admission functional level  Description: INTERVENTIONS:  - Perform BMAT or MOVE assessment daily    - Set and communicate daily mobility goal to care team and patient/family/caregiver  - Collaborate with rehabilitation services on mobility goals if consulted  - Perform Range of Motion 3 times a day  - Reposition patient every 2 hours    - Dangle patient 3 times a day  - Stand patient 3 times a day  - Ambulate patient 3 times a day  - Out of bed to chair 3 times a day   - Out of bed for meals 3 times a day  - Out of bed for toileting  - Record patient progress and toleration of activity level   Outcome: Progressing     Problem: Potential for Falls  Goal: Patient will remain free of falls  Description: INTERVENTIONS:  - Educate patient/family on patient safety including physical limitations  - Instruct patient to call for assistance with activity   - Consult OT/PT to assist with strengthening/mobility   - Keep Call bell within reach  - Keep bed low and locked with side rails adjusted as appropriate  - Keep care items and personal belongings within reach  - Initiate and maintain comfort rounds  - Make Fall Risk Sign visible to staff  - Offer Toileting every 2 Hours, in advance of need  - Initiate/Maintain bed alarm  - Obtain necessary fall risk management equipment: Alarm  - Apply yellow socks and bracelet for high fall risk patients  - Consider moving patient to room near nurses station  Outcome: Progressing     Problem: PAIN - ADULT  Goal: Verbalizes/displays adequate comfort level or baseline comfort level  Description: Interventions:  - Encourage patient to monitor pain and request assistance  - Assess pain using appropriate pain scale  - Administer analgesics based on type and severity of pain and evaluate response  - Implement non-pharmacological measures as appropriate and evaluate response  - Consider cultural and social influences on pain and pain management  - Notify physician/advanced practitioner if interventions unsuccessful or patient reports new pain  Outcome: Progressing     Problem: INFECTION - ADULT  Goal: Absence or prevention of progression during hospitalization  Description: INTERVENTIONS:  - Assess and monitor for signs and symptoms of infection  - Monitor lab/diagnostic results  - Monitor all insertion sites, i e  indwelling lines, tubes, and drains  - Monitor endotracheal if appropriate and nasal secretions for changes in amount and color  - [place] appropriate cooling/warming therapies per order  - Administer medications as ordered  - Instruct and encourage patient and family to use good hand hygiene technique  - Identify and instruct in appropriate isolation precautions for identified infection/condition  Outcome: Progressing  Goal: Absence of fever/infection during neutropenic period  Description: INTERVENTIONS:  - Monitor WBC    Outcome: Progressing     Problem: SAFETY ADULT  Goal: Maintain or return to baseline ADL function  Description: INTERVENTIONS:  -  Assess patient's ability to carry out ADLs; assess patient's baseline for ADL function and identify physical deficits which impact ability to perform ADLs (bathing, care of mouth/teeth, toileting, grooming, dressing, etc )  - Assess/evaluate cause of self-care deficits   - Assess range of motion  - Assess patient's mobility; develop plan if impaired  - Assess patient's need for assistive devices and provide as appropriate  - Encourage maximum independence but intervene and supervise when necessary  - Involve family in performance of ADLs  - Assess for home care needs following discharge   - Consider OT consult to assist with ADL evaluation and planning for discharge  - Provide patient education as appropriate  Outcome: Progressing  Goal: Maintains/Returns to pre admission functional level  Description: INTERVENTIONS:  - Perform BMAT or MOVE assessment daily    - Set and communicate daily mobility goal to care team and patient/family/caregiver  - Collaborate with rehabilitation services on mobility goals if consulted  - Perform Range of Motion 3 times a day  - Reposition patient every 2 hours    - Dangle patient 3 times a day  - Stand patient 3 times a day  - Ambulate patient 3 times a day  - Out of bed to chair 3 times a day   - Out of bed for meals 3 times a day  - Out of bed for toileting  - Record patient progress and toleration of activity level   Outcome: Progressing  Goal: Patient will remain free of falls  Description: INTERVENTIONS:  - Educate patient/family on patient safety including physical limitations  - Instruct patient to call for assistance with activity   - Consult OT/PT to assist with strengthening/mobility   - Keep Call bell within reach  - Keep bed low and locked with side rails adjusted as appropriate  - Keep care items and personal belongings within reach  - Initiate and maintain comfort rounds  - Make Fall Risk Sign visible to staff  - Offer Toileting every 2 Hours, in advance of need  - Initiate/Maintain bed alarm  - Obtain necessary fall risk management equipment: Alarm  - Apply yellow socks and bracelet for high fall risk patients  - Consider moving patient to room near nurses station  Outcome: Progressing     Problem: GENITOURINARY - ADULT  Goal: Maintains or returns to baseline urinary function  Description: INTERVENTIONS:  - Assess urinary function  - Encourage oral fluids to ensure adequate hydration if ordered  - Administer IV fluids as ordered to ensure adequate hydration  - Administer ordered medications as needed  - Offer frequent toileting  - Follow urinary retention protocol if ordered  Outcome: Progressing  Goal: Absence of urinary retention  Description: INTERVENTIONS:  - Assess patient’s ability to void and empty bladder  - Monitor I/O  - Bladder scan as needed  - Discuss with physician/AP medications to alleviate retention as needed  - Discuss catheterization for long term situations as appropriate  Outcome: Progressing  Goal: Urinary catheter remains patent  Description: INTERVENTIONS:  - Assess patency of urinary catheter  - If patient has a chronic wagner, consider changing catheter if non-functioning  - Follow guidelines for intermittent irrigation of non-functioning urinary catheter  Outcome: Progressing     Problem: HEMATOLOGIC - ADULT  Goal: Maintains hematologic stability  Description: INTERVENTIONS  - Assess for signs and symptoms of bleeding or hemorrhage  - Monitor labs  - Administer supportive blood products/factors as ordered and appropriate  Outcome: Progressing     Problem: MUSCULOSKELETAL - ADULT  Goal: Maintain or return mobility to safest level of function  Description: INTERVENTIONS:  - Assess patient's ability to carry out ADLs; assess patient's baseline for ADL function and identify physical deficits which impact ability to perform ADLs (bathing, care of mouth/teeth, toileting, grooming, dressing, etc )  - Assess/evaluate cause of self-care deficits   - Assess range of motion  - Assess patient's mobility  - Assess patient's need for assistive devices and provide as appropriate  - Encourage maximum independence but intervene and supervise when necessary  - Involve family in performance of ADLs  - Assess for home care needs following discharge   - Consider OT consult to assist with ADL evaluation and planning for discharge  - Provide patient education as appropriate  Outcome: Progressing  Goal: Maintain proper alignment of affected body part  Description: INTERVENTIONS:  - Support, maintain and protect limb and body alignment  - Provide patient/ family with appropriate education  Outcome: Progressing

## 2023-01-18 NOTE — ASSESSMENT & PLAN NOTE
· Patient was transferred from [address] [place] [address] due to fever and positive blood cultures  · Blood culture growing Enterobacter  · His Port-A-Cath was thought to be the source  · Port-A-Cath removed by interventional radiology  · Repeat blood cultures no growth at 72 hours  · Spoke with ID  They are changing Cefepime to oral levaquin--->> patient initially completed antibiotic therapy as of 1/17    Loop recorder is not in blood stream and is not thought to be infected  Appreciate ID following along; have spoken with patient's family; will reinitiate amoxicillin over concerns for elevated white count    May repeat CT abdomen with contrast

## 2023-01-19 ENCOUNTER — APPOINTMENT (INPATIENT)
Dept: CT IMAGING | Facility: HOSPITAL | Age: 78
End: 2023-01-19

## 2023-01-19 LAB
ALBUMIN SERPL BCP-MCNC: 3.1 G/DL (ref 3.5–5)
ALP SERPL-CCNC: 102 U/L (ref 34–104)
ALT SERPL W P-5'-P-CCNC: 41 U/L (ref 7–52)
ANION GAP SERPL CALCULATED.3IONS-SCNC: 7 MMOL/L (ref 4–13)
AST SERPL W P-5'-P-CCNC: 39 U/L (ref 13–39)
BASOPHILS # BLD AUTO: 0.06 THOUSANDS/ÂΜL (ref 0–0.1)
BASOPHILS NFR BLD AUTO: 1 % (ref 0–1)
BILIRUB SERPL-MCNC: 0.28 MG/DL (ref 0.2–1)
BUN SERPL-MCNC: 49 MG/DL (ref 5–25)
CALCIUM ALBUM COR SERPL-MCNC: 9.7 MG/DL (ref 8.3–10.1)
CALCIUM SERPL-MCNC: 9 MG/DL (ref 8.4–10.2)
CHLORIDE SERPL-SCNC: 101 MMOL/L (ref 96–108)
CO2 SERPL-SCNC: 27 MMOL/L (ref 21–32)
CREAT SERPL-MCNC: 2.75 MG/DL (ref 0.6–1.3)
EOSINOPHIL # BLD AUTO: 1.02 THOUSAND/ÂΜL (ref 0–0.61)
EOSINOPHIL NFR BLD AUTO: 8 % (ref 0–6)
ERYTHROCYTE [DISTWIDTH] IN BLOOD BY AUTOMATED COUNT: 17.2 % (ref 11.6–15.1)
GFR SERPL CREATININE-BSD FRML MDRD: 20 ML/MIN/1.73SQ M
GLUCOSE SERPL-MCNC: 123 MG/DL (ref 65–140)
GLUCOSE SERPL-MCNC: 126 MG/DL (ref 65–140)
GLUCOSE SERPL-MCNC: 163 MG/DL (ref 65–140)
GLUCOSE SERPL-MCNC: 241 MG/DL (ref 65–140)
GLUCOSE SERPL-MCNC: 420 MG/DL (ref 65–140)
HCT VFR BLD AUTO: 26.4 % (ref 36.5–49.3)
HGB BLD-MCNC: 8.3 G/DL (ref 12–17)
IMM GRANULOCYTES # BLD AUTO: 0.1 THOUSAND/UL (ref 0–0.2)
IMM GRANULOCYTES NFR BLD AUTO: 1 % (ref 0–2)
LYMPHOCYTES # BLD AUTO: 2.4 THOUSANDS/ÂΜL (ref 0.6–4.47)
LYMPHOCYTES NFR BLD AUTO: 18 % (ref 14–44)
MCH RBC QN AUTO: 29 PG (ref 26.8–34.3)
MCHC RBC AUTO-ENTMCNC: 31.4 G/DL (ref 31.4–37.4)
MCV RBC AUTO: 92 FL (ref 82–98)
MONOCYTES # BLD AUTO: 0.94 THOUSAND/ÂΜL (ref 0.17–1.22)
MONOCYTES NFR BLD AUTO: 7 % (ref 4–12)
NEUTROPHILS # BLD AUTO: 8.72 THOUSANDS/ÂΜL (ref 1.85–7.62)
NEUTS SEG NFR BLD AUTO: 65 % (ref 43–75)
NRBC BLD AUTO-RTO: 0 /100 WBCS
PLATELET # BLD AUTO: 262 THOUSANDS/UL (ref 149–390)
PMV BLD AUTO: 9.8 FL (ref 8.9–12.7)
POTASSIUM SERPL-SCNC: 5.1 MMOL/L (ref 3.5–5.3)
PROT SERPL-MCNC: 6.8 G/DL (ref 6.4–8.4)
RBC # BLD AUTO: 2.86 MILLION/UL (ref 3.88–5.62)
SODIUM SERPL-SCNC: 135 MMOL/L (ref 135–147)
WBC # BLD AUTO: 13.24 THOUSAND/UL (ref 4.31–10.16)

## 2023-01-19 RX ORDER — LANOLIN ALCOHOL/MO/W.PET/CERES
3 CREAM (GRAM) TOPICAL
Status: DISCONTINUED | OUTPATIENT
Start: 2023-01-19 | End: 2023-01-24 | Stop reason: HOSPADM

## 2023-01-19 RX ADMIN — GABAPENTIN 300 MG: 300 CAPSULE ORAL at 21:09

## 2023-01-19 RX ADMIN — AMPICILLIN SODIUM 2000 MG: 2 INJECTION, POWDER, FOR SOLUTION INTRAMUSCULAR; INTRAVENOUS at 13:55

## 2023-01-19 RX ADMIN — OXYCODONE HYDROCHLORIDE 10 MG: 10 TABLET ORAL at 00:31

## 2023-01-19 RX ADMIN — AMPICILLIN SODIUM 2000 MG: 2 INJECTION, POWDER, FOR SOLUTION INTRAMUSCULAR; INTRAVENOUS at 01:26

## 2023-01-19 RX ADMIN — INSULIN GLARGINE 15 UNITS: 100 INJECTION, SOLUTION SUBCUTANEOUS at 21:08

## 2023-01-19 RX ADMIN — OXYBUTYNIN CHLORIDE 5 MG: 5 TABLET ORAL at 21:09

## 2023-01-19 RX ADMIN — DIPHENHYDRAMINE HCL 25 MG: 25 TABLET, COATED ORAL at 04:03

## 2023-01-19 RX ADMIN — METOPROLOL TARTRATE 25 MG: 25 TABLET, FILM COATED ORAL at 09:06

## 2023-01-19 RX ADMIN — FUROSEMIDE 20 MG: 20 TABLET ORAL at 09:06

## 2023-01-19 RX ADMIN — OXYBUTYNIN CHLORIDE 5 MG: 5 TABLET ORAL at 09:05

## 2023-01-19 RX ADMIN — OXYBUTYNIN CHLORIDE 5 MG: 5 TABLET ORAL at 16:19

## 2023-01-19 RX ADMIN — BIMATOPROST 1 DROP: 0.1 SOLUTION/ DROPS OPHTHALMIC at 21:10

## 2023-01-19 RX ADMIN — OXYCODONE HYDROCHLORIDE 10 MG: 10 TABLET ORAL at 21:09

## 2023-01-19 RX ADMIN — EZETIMIBE 10 MG: 10 TABLET ORAL at 09:05

## 2023-01-19 RX ADMIN — Medication 3 MG: at 01:26

## 2023-01-19 RX ADMIN — CALCITRIOL 0.25 MCG: 0.25 CAPSULE, LIQUID FILLED ORAL at 09:06

## 2023-01-19 RX ADMIN — HYDROMORPHONE HYDROCHLORIDE 0.2 MG: 0.2 INJECTION, SOLUTION INTRAMUSCULAR; INTRAVENOUS; SUBCUTANEOUS at 04:45

## 2023-01-19 RX ADMIN — CYANOCOBALAMIN TAB 500 MCG 1000 MCG: 500 TAB at 09:05

## 2023-01-19 RX ADMIN — ARIPIPRAZOLE 2 MG: 2 TABLET ORAL at 09:05

## 2023-01-19 RX ADMIN — PANTOPRAZOLE SODIUM 20 MG: 20 TABLET, DELAYED RELEASE ORAL at 06:09

## 2023-01-19 RX ADMIN — METOPROLOL TARTRATE 25 MG: 25 TABLET, FILM COATED ORAL at 21:09

## 2023-01-19 RX ADMIN — OXYCODONE HYDROCHLORIDE 10 MG: 10 TABLET ORAL at 09:06

## 2023-01-19 NOTE — ASSESSMENT & PLAN NOTE
· History of hematuria due to bladder cancer  · Hg 7 1 prior to transfer   Received 1 unit PRBC  · Iron panel showing continued deficiency-unable to utilize IV iron given bacteremia  · Seen in consult by hematology oncology    Hemoglobin 8 2 > 8 1 > 8 0 > 7 9 > 8 >7 7>8 8>9 1>8 3    Received 1 unit PRBCs -1/15, hemoglobin stable today, monitor while inpatient

## 2023-01-19 NOTE — ASSESSMENT & PLAN NOTE
Malnutrition Findings:   Adult Malnutrition type: Chronic illness  Adult Degree of Malnutrition: Other severe protein calorie malnutrition  Malnutrition Characteristics: Weight loss, Muscle loss, Fat loss                  360 Statement: related to inadequate energy intake as evidenced by 6% weight loss 12/20/22-1/11/23, hollowing of supraclavicular space, wasted interosseous, hollowing orbital  Intervention CCD diet, medical food supplements with meals  BMI Findings: Body mass index is 23 8 kg/m²

## 2023-01-19 NOTE — CASE MANAGEMENT
Case Management Discharge Planning Note    Patient name Albino Choudhury  Location [facility] /E5 [address]-* MRN [id number]  : 1945 Date 2023       Current Admission Date: 2023  Current Admission Diagnosis:Bacteremia due to Enterobacter species   Patient Active Problem List    Diagnosis Date Noted   • Severe protein-calorie malnutrition ([facility] ) 2023   • History of pleural effusion 2023   • Nephrostomy status ([facility] ) 2023   • Acute on chronic blood loss anemia 2023   • Bacteremia due to Enterobacter species 2023   • Elevated brain natriuretic peptide (BNP) level 2023   • Ambulatory dysfunction 2023   • Influenza A 2023   • Elevated troponin 2023   • Right sided weakness 2022   • Stroke-like symptoms 2022   • Symptomatic hypotension 2022   • Insomnia 2022   • Right wrist drop 2022   • Chronic pleural effusion 2022   • UTI (urinary tract infection) 2022   • SOB (shortness of breath) 2022   • History of tobacco use disorder 2022   • Retroperitoneal lymphadenopathy 2022   • Pulmonary nodules 2022   • Syncope and collapse 2022   • Depression with suicidal ideation 2022   • Cancer related pain 2022   • Bilateral hydronephrosis 04/10/2022   • Stage 4 chronic kidney disease ([facility] ) 2022   • Fall 2022   • Hyperkalemia 2022   • Retained ureteral stent    • 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[facility] ) 2022   • Embolism and 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 ([facility] ) 2022   • Abnormal CT scan, bladder 2022   • Port-A-Cath in place 2022   • Continuous opioid dependence ([facility]  ) 2021   • Hematuria 10/24/2021   • Acute urinary retention 10/21/2021   • Chronic pain syndrome 2021   • Lumbar spondylosis    • [facility] bronchitis ([facility] ) 2021   • Moderate major depression, single episode ([place] [address] ) 2021   • Thrombocytopenia ([facility] ) 03/02/2021   • 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 03/02/2021   • Gross hematuria 02/09/2021   • PAD (peripheral artery disease) ([facility])    • Left carotid bruit    • Anemia of chronic disease 12/19/2020   • Preoperative clearance 12/19/2020   • Symptomatic anemia 10/10/2020   • Diabetic ulcer of left foot associated with type 2 diabetes mellitus, 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 ([facility] ) 10/08/2020   • Acute kidney injury superimposed on CKD ([facility] )    • Dysuria 08/17/2020   • Diabetic polyneuropathy associated with type 2 diabetes mellitus ([facility] ) 07/16/2020   • Abnormal MRI, lumbar spine 06/29/2020   • Malignant neoplasm of anterior wall of urinary bladder ([facility] )    • Secondary renal hyperparathyroidism ([facility] ) 02/12/2020   • Preop examination 04/16/2019   • Superficial phlebitis 03/07/2019   • Arteriosclerosis of artery of extremity ([facility] ) 02/22/2019   • Stable angina pectoris ([facility] ) 02/22/2019   • Herpes zoster without complication 93/22/0920   • Localized edema 02/22/2019   • Back pain 01/31/2019   • Degeneration of lumbar intervertebral disc 12/03/2018   • Primary osteoarthritis of left knee 03/16/2018   • Bladder carcinoma ([facility] ) 08/16/2016   • Presence of stent in coronary artery 08/16/2016   • Hypertension with renal disease 10/29/2015   • Hyperlipidemia 10/29/2015   • Cystitis due to intravesical BCG administration 09/24/2015   • Coronary artery disease of native artery of native heart with stable angina pectoris ([facility] ) 05/19/2011   • Type 2 diabetes mellitus, with long-term current use of insulin ([facility] ) 01/09/2004      LOS (days): 12  Geometric Mean LOS (GMLOS) (days): 5 10  Days to GMLOS:-6 6     OBJECTIVE:  Risk of Unplanned Readmission Score: 68 15         Current admission status: Inpatient   Preferred Pharmacy:   [address] [place] [address]   [address] [place] [address]  Phone: [phone] Fax: [place], [place] - [address] [facility]  [address] [facility]  [address] [place] [address]  Phone: [phone] Fax: [phone]    Primary Care Provider: Radha Gil MD    Primary Insurance: MEDICARE  Secondary Insurance: BLUE CROSS    DISCHARGE DETAILS:    Additional Comments: Patient accepted to [address] [facility] hospital in [place] and [facility] in [address] [place] [address] Rd made patient's grand daughter Jt Balderas and patient aware  CM awaiting decision from the family, CM made facilities aware  CM also received a call from Niru Swanson at the [facility], [phone], who is trying to put home health aide in place at patient's home  [facility] to [facility]  Patient is still on irrigation every 4 hours and will need assistance at home with that as well  CM will continue to follow  Update: CM received a call from patient's grand daughter, Dustin Mon, who states family is afraid to choose one of the accepting facilities- thinking patient will become non compliant with his care if family is not in the area  Doris states she is leaning more towards discharge to home with extra equipment and learning from bedside nurse how to do the manual irrigations  Jt Balderas is asking for the following items be delivered to patient's home: Hospital bed, marvin lift, shower chair w/ no arms, motorized wheel chair, grab bars, raised toilet seat  Doris states patient could possibly go to rehab near [place]- patient is following a doctor in that area  CM had referrals out in the [place] area with none that have responded via [employer]  CM will make manual follow up calls to the rehabs in [place] for a determination

## 2023-01-19 NOTE — ASSESSMENT & PLAN NOTE
Lab Results   Component Value Date    EGFR 20 01/19/2023    EGFR 20 01/18/2023    EGFR 18 01/17/2023    CREATININE 2 75 (H) 01/19/2023    CREATININE 2 85 (H) 01/18/2023    CREATININE 3 12 (H) 01/17/2023     · Chronic CKD 4;  baseline appears to be around 3 0-3 4    Monitor BMP

## 2023-01-19 NOTE — RESTORATIVE TECHNICIAN NOTE
Restorative Technician Note      Patient Name: Lisset Stapleton     Restorative Tech Visit Date: 01/19/23  Note Type: Mobility  Patient Position Upon Consult: Supine  Assistive Device: Roller walker  Patient Position at End of Consult: Bedside chair;  All needs within reach; Bed/Chair alarm activated          ns

## 2023-01-19 NOTE — ASSESSMENT & PLAN NOTE
· Patient was transferred from [facility] due to fever and positive blood cultures  · Blood culture growing Enterobacter  · His Port-A-Cath was thought to be the source  · Port-A-Cath removed by interventional radiology  · Repeat blood cultures no growth at 72 hours  · Spoke with ID  They are changing Cefepime to oral levaquin--->> patient initially completed antibiotic therapy as of 1/17    Loop recorder is not in blood stream and is not thought to be infected  Appreciate ID following along; have spoken with patient's family on 1/18; will reinitiate amoxicillin over concerns for elevated white count  May repeat CT abdomen with contrast     Infectious disease requesting CT abdomen with contrast; family reports patient has allergy to contrast, but have not seen this documented in chart  We will investigate further and order CT as appropriate

## 2023-01-19 NOTE — PLAN OF CARE
Problem: Nutrition/Hydration-ADULT  Goal: Nutrient/Hydration intake appropriate for improving, restoring or maintaining nutritional needs  Description: Monitor and assess patient's nutrition/hydration status for malnutrition  Collaborate with interdisciplinary team and initiate plan and interventions as ordered  Monitor patient's weight and dietary intake as ordered or per policy  Utilize nutrition screening tool and intervene as necessary  Determine patient's food preferences and provide high-protein, high-caloric foods as appropriate       INTERVENTIONS:  - Monitor oral intake, urinary output, labs, and treatment plans  - Assess nutrition and hydration status and recommend course of action  - Evaluate amount of meals eaten  - Assist patient with eating if necessary   - Allow adequate time for meals  - Recommend/ encourage appropriate diets, oral nutritional supplements, and vitamin/mineral supplements  - Order, calculate, and assess calorie counts as needed  - Recommend, monitor, and adjust tube feedings and TPN/PPN based on assessed needs  - Assess need for intravenous fluids  - Provide specific nutrition/hydration education as appropriate  - Include patient/family/caregiver in decisions related to nutrition  Outcome: Progressing     Problem: Prexisting or High Potential for Compromised Skin Integrity  Goal: Skin integrity is maintained or improved  Description: INTERVENTIONS:  - Identify patients at risk for skin breakdown  - Assess and monitor skin integrity  - Assess and monitor nutrition and hydration status  - Monitor labs   - Assess for incontinence   - Turn and reposition patient  - Assist with mobility/ambulation  - Relieve pressure over bony prominences  - Avoid friction and shearing  - Provide appropriate hygiene as needed including keeping skin clean and dry  - Evaluate need for skin moisturizer/barrier cream  - Collaborate with interdisciplinary team   - Patient/family teaching  - Consider wound care consult   Outcome: Progressing     Problem: MOBILITY - ADULT  Goal: Maintain or return to baseline ADL function  Description: INTERVENTIONS:  -  Assess patient's ability to carry out ADLs; assess patient's baseline for ADL function and identify physical deficits which impact ability to perform ADLs (bathing, care of mouth/teeth, toileting, grooming, dressing, etc )  - Assess/evaluate cause of self-care deficits   - Assess range of motion  - Assess patient's mobility; develop plan if impaired  - Assess patient's need for assistive devices and provide as appropriate  - Encourage maximum independence but intervene and supervise when necessary  - Involve family in performance of ADLs  - Assess for home care needs following discharge   - Consider OT consult to assist with ADL evaluation and planning for discharge  - Provide patient education as appropriate  Outcome: Progressing  Goal: Maintains/Returns to pre admission functional level  Description: INTERVENTIONS:  - Perform BMAT or MOVE assessment daily    - Set and communicate daily mobility goal to care team and patient/family/caregiver  - Collaborate with rehabilitation services on mobility goals if consulted  - Perform Range of Motion 3 times a day  - Reposition patient every 2 hours    - Dangle patient 3 times a day  - Stand patient 3 times a day  - Ambulate patient 3 times a day  - Out of bed to chair 3 times a day   - Out of bed for meals 3 times a day  - Out of bed for toileting  - Record patient progress and toleration of activity level   Outcome: Progressing     Problem: Potential for Falls  Goal: Patient will remain free of falls  Description: INTERVENTIONS:  - Educate patient/family on patient safety including physical limitations  - Instruct patient to call for assistance with activity   - Consult OT/PT to assist with strengthening/mobility   - Keep Call bell within reach  - Keep bed low and locked with side rails adjusted as appropriate  - Keep care items and personal belongings within reach  - Initiate and maintain comfort rounds  - Make Fall Risk Sign visible to staff  - Offer Toileting every 2 Hours, in advance of need  - Initiate/Maintain bed alarm  - Obtain necessary fall risk management equipment: Alarm  - Apply yellow socks and bracelet for high fall risk patients  - Consider moving patient to room near nurses station  Outcome: Progressing     Problem: PAIN - ADULT  Goal: Verbalizes/displays adequate comfort level or baseline comfort level  Description: Interventions:  - Encourage patient to monitor pain and request assistance  - Assess pain using appropriate pain scale  - Administer analgesics based on type and severity of pain and evaluate response  - Implement non-pharmacological measures as appropriate and evaluate response  - Consider cultural and social influences on pain and pain management  - Notify physician/advanced practitioner if interventions unsuccessful or patient reports new pain  Outcome: Progressing     Problem: INFECTION - ADULT  Goal: Absence or prevention of progression during hospitalization  Description: INTERVENTIONS:  - Assess and monitor for signs and symptoms of infection  - Monitor lab/diagnostic results  - Monitor all insertion sites, i e  indwelling lines, tubes, and drains  - Monitor endotracheal if appropriate and nasal secretions for changes in amount and color  - [place] appropriate cooling/warming therapies per order  - Administer medications as ordered  - Instruct and encourage patient and family to use good hand hygiene technique  - Identify and instruct in appropriate isolation precautions for identified infection/condition  Outcome: Progressing  Goal: Absence of fever/infection during neutropenic period  Description: INTERVENTIONS:  - Monitor WBC    Outcome: Progressing     Problem: SAFETY ADULT  Goal: Maintain or return to baseline ADL function  Description: INTERVENTIONS:  -  Assess patient's ability to carry out ADLs; assess patient's baseline for ADL function and identify physical deficits which impact ability to perform ADLs (bathing, care of mouth/teeth, toileting, grooming, dressing, etc )  - Assess/evaluate cause of self-care deficits   - Assess range of motion  - Assess patient's mobility; develop plan if impaired  - Assess patient's need for assistive devices and provide as appropriate  - Encourage maximum independence but intervene and supervise when necessary  - Involve family in performance of ADLs  - Assess for home care needs following discharge   - Consider OT consult to assist with ADL evaluation and planning for discharge  - Provide patient education as appropriate  Outcome: Progressing  Goal: Maintains/Returns to pre admission functional level  Description: INTERVENTIONS:  - Perform BMAT or MOVE assessment daily    - Set and communicate daily mobility goal to care team and patient/family/caregiver  - Collaborate with rehabilitation services on mobility goals if consulted  - Perform Range of Motion 3 times a day  - Reposition patient every 2 hours    - Dangle patient 3 times a day  - Stand patient 3 times a day  - Ambulate patient 3 times a day  - Out of bed to chair 3 times a day   - Out of bed for meals 3 times a day  - Out of bed for toileting  - Record patient progress and toleration of activity level   Outcome: Progressing  Goal: Patient will remain free of falls  Description: INTERVENTIONS:  - Educate patient/family on patient safety including physical limitations  - Instruct patient to call for assistance with activity   - Consult OT/PT to assist with strengthening/mobility   - Keep Call bell within reach  - Keep bed low and locked with side rails adjusted as appropriate  - Keep care items and personal belongings within reach  - Initiate and maintain comfort rounds  - Make Fall Risk Sign visible to staff  - Offer Toileting every 2 Hours, in advance of need  - Initiate/Maintain bed alarm  - Obtain necessary fall risk management equipment: Alarm  - Apply yellow socks and bracelet for high fall risk patients  - Consider moving patient to room near nurses station  Outcome: Progressing     Problem: GENITOURINARY - ADULT  Goal: Maintains or returns to baseline urinary function  Description: INTERVENTIONS:  - Assess urinary function  - Encourage oral fluids to ensure adequate hydration if ordered  - Administer IV fluids as ordered to ensure adequate hydration  - Administer ordered medications as needed  - Offer frequent toileting  - Follow urinary retention protocol if ordered  Outcome: Progressing  Goal: Absence of urinary retention  Description: INTERVENTIONS:  - Assess patient’s ability to void and empty bladder  - Monitor I/O  - Bladder scan as needed  - Discuss with physician/AP medications to alleviate retention as needed  - Discuss catheterization for long term situations as appropriate  Outcome: Progressing  Goal: Urinary catheter remains patent  Description: INTERVENTIONS:  - Assess patency of urinary catheter  - If patient has a chronic wagner, consider changing catheter if non-functioning  - Follow guidelines for intermittent irrigation of non-functioning urinary catheter  Outcome: Progressing     Problem: HEMATOLOGIC - ADULT  Goal: Maintains hematologic stability  Description: INTERVENTIONS  - Assess for signs and symptoms of bleeding or hemorrhage  - Monitor labs  - Administer supportive blood products/factors as ordered and appropriate  Outcome: Progressing     Problem: MUSCULOSKELETAL - ADULT  Goal: Maintain or return mobility to safest level of function  Description: INTERVENTIONS:  - Assess patient's ability to carry out ADLs; assess patient's baseline for ADL function and identify physical deficits which impact ability to perform ADLs (bathing, care of mouth/teeth, toileting, grooming, dressing, etc )  - Assess/evaluate cause of self-care deficits   - Assess range of motion  - Assess patient's mobility  - Assess patient's need for assistive devices and provide as appropriate  - Encourage maximum independence but intervene and supervise when necessary  - Involve family in performance of ADLs  - Assess for home care needs following discharge   - Consider OT consult to assist with ADL evaluation and planning for discharge  - Provide patient education as appropriate  Outcome: Progressing  Goal: Maintain proper alignment of affected body part  Description: INTERVENTIONS:  - Support, maintain and protect limb and body alignment  - Provide patient/ family with appropriate education  Outcome: Progressing

## 2023-01-19 NOTE — ASSESSMENT & PLAN NOTE
Lab Results   Component Value Date    HGBA1C 7 2 (H) 10/20/2022     · Currently receiving Lantus 15 units with sliding scale TID  · ADA diet  · Family reports allergy to humalog, so that is not being used    Recent Labs     01/18/23  0728 01/18/23  1124 01/18/23  1611 01/18/23 2125   POCGLU 139 110 200* 228*       Blood Sugar Average: Last 72 hrs:  (P) 179 75

## 2023-01-19 NOTE — PROGRESS NOTES
Progress Note - Infectious Disease   Rebecca Gilman 76 y o  male MRN: [id number]  Unit/Bed#: E5 -01 Encounter: [id number]      Impression/Plan:  1  Persistent leukocytosis  Patient recently seen by our service and treated for problem 5  He continues to have mild fluctuating leukocytosis with peripheral eosinophilia  Suspicion remains that this is inflammatory in nature given problem 2  Consider also medication side effect given peripheral eosinophilia  Detailed discussion with patient's granddaughter and they would like to pursue/attempt treatment of Enterococcus previously seen in the urine to assure that there is no ongoing/persistent infection  Patient is otherwise hemodynamically stable but chronically ill and frail  Blood cultures repeated today and are pending  White count stable  Continue ampicillin 2 g every 12 hours, renally dosed, for now  Continue to trend fever curve/vitals  Repeat CBC and chemistry ordered for tomorrow  Maintain off of Tylenol  Recommend discussion of repeat cultures with infection control/administration  Consider reengage in urology in conversation with family  Follow-up CT imaging of the abdomen without contrast given 3  Monitor for any progressive symptoms  Recommend detailed review of patient's other medications  Follow-up pending blood cultures for completeness  Additional supportive care as per primary  Duration of therapy pending clinical course     2  Metastatic high-grade treatment refractory bladder cancer  Patient unfortunately with progressive metastatic disease now also seen on CT imaging  Also having chronic/persistent hematuria  I suspect that this is contributing to the above  Antibiotic as above for now  Recommend reengaging urology as above  Recommend follow-up with oncology as outpatient  Continue to trend fever curve/vitals  Repeat labs ordered for tomorrow     3  Chronic kidney disease  Significant kidney dysfunction at baseline    Creatinine clearance calculated and this does affect antibiotic dosing  Ampicillin dosing as above  Repeat chemistry tomorrow  We will further dose adjust as needed  Fluid hydration as per primary  Avoid nephrotoxic agents     4   Acute on chronic anemia  This is related to problem #2  Ongoing hemoglobin monitoring and transfusional support as per primary     5  Recently treated Enterobacter bacteremia  Patient completed treatment 1/18  Levaquin which was given also covered ESBL E  coli that had previously been isolated in the patient's urine  Plans for targeted therapy towards Enterococcus as above      Above plan discussed with patient, his granddaughter and primary service this evening     I have spent 50 minutes with Patient/family, other providers and in review of records today in completing this visit  Total time spent included review of testing, ordering medications and tests, communicating with other providers, documentation time and counseling/providing education to family      Antibiotics:  Ampicillin 2    24 Hour events:  Yesterday and overnight notes reviewed and no acute events noted  Patient remains off Tylenol  Subjective:  Patient seen this morning and his granddaughter was on speaker phone  He denied having any further nausea or vomiting today  He is reporting pain again specifically in the lower abdomen with bladder spasming  Shirley catheter again with bloody output  Discussed with primary service and CT imaging ordered  This evening was contacted by patient's granddaughter to again review his culture results  She is concerned by isolation of multiple organisms seen on the cultures from the sixth and then subsequent organisms/different organisms isolated on the seventh  She is concerned that we need additional cultures to see what exactly we are treating as she feels the patient is not clinically getting better and that there is something that we are missing    I reviewed with her that my suspicion is overall low for an ongoing urinary tract infection contributing to his current state  He is off Tylenol now for 24 hours without any fevers  His white count is essentially unchanged on ampicillin and it did not change on Levaquin or cefepime  He did not receive any antibiotics that would have treated the organisms in the urine on the sixth and he isolated different organisms on the seventh which I explained are likely due to biofilm formation on the stents that he has in place and given the fact that he has a manipulated urinary tract  At present the patient has a catheter in place with continued hematuria  She expressed interest in potentially having the catheter removed and then repeating a clean specimen and then potentially replacing catheter there after  I did discuss with her that there is a risk of then causing infection with these manipulations  I have explained as well the concern/issue that regardless of treatment rendered here the patient remains at high risk of relapse and recurrence from a potential infection from his urinary tract regardless of what we have done here given his multiple comorbid conditions  I additionally explained that I do not expect that we will sterilize the patient's urine for any prolonged period of time with ongoing antibiotic  His hemoglobin continues to drift and he has other explanations for his ongoing white count including his progressing malignancy seen on CT imaging when compared to the October images  Ultimately given what she is requesting, I reached out to the primary service  I recommended discussion of this case going forward with administration and infection control and urology  At this time we will continue with plan to follow-up patient's CAT scan and will maintain on ampicillin for now       Objective:  Vitals:  Temp:  [98 1 °F (36 7 °C)-98 8 °F (37 1 °C)] 98 1 °F (36 7 °C)  HR:  [83-91] 85  Resp:  [16] 16  BP: (115-136)/(69-77) 121/69  SpO2:  [96 %-99 %] 99 %  Temp (24hrs), Av 4 °F (36 9 °C), Min:98 1 °F (36 7 °C), Max:98 8 °F (37 1 °C)  Current: Temperature: 98 1 °F (36 7 °C)    Physical Exam:   General Appearance:  Alert, interactive, nontoxic, no acute distress  Chronically ill-appearing and frail  Throat: Oropharynx moist without lesions  Lungs:   Clear to auscultation bilaterally; no wheezes, rhonchi or rales; respirations unlabored on room air   Heart:  RRR; no murmur, rub or gallop appreciated   Abdomen:   Soft, non-tender, non-distended, positive bowel sounds  I am unable to elicit discomfort on palpation of the lower abdomen  He does have blood-tinged urine noted in Shirley catheter  Extremities: No clubbing, cyanosis or edema   Skin: No new rashes or lesions  No new draining wounds noted  Labs, Imaging, & Other studies:   All pertinent labs and imaging studies were personally reviewed as below  Results from last 7 days   Lab Units 23  0515 23  1008 23  0548   WBC Thousand/uL 13 24* 13 74* 14 63*   HEMOGLOBIN g/dL 8 3* 9 1* 9 1*   PLATELETS Thousands/uL 262 263 269     Results from last 7 days   Lab Units 23  0515 23  0639 23  0548   POTASSIUM mmol/L 5 1   < > 5 0   CHLORIDE mmol/L 101   < > 102   CO2 mmol/L 27   < > 26   BUN mg/dL 49*   < > 50*   CREATININE mg/dL 2 75*   < > 3 12*   EGFR ml/min/1 73sq m 20   < > 18   CALCIUM mg/dL 9 0   < > 9 7   AST U/L 39  --  39   ALT U/L 41  --  47   ALK PHOS U/L 102  --  133*    < > = values in this interval not displayed  Lab interpretation/comments: White count remains essentially unchanged with peripheral eosinophilia  Hemoglobin continues to drift downward again  Creatinine on the lower end of baseline  Imaging interpretation/comments: CT imaging is ordered and pending  Culture data: Blood cultures ordered and collected today and are pending

## 2023-01-19 NOTE — PLAN OF CARE
Problem: Nutrition/Hydration-ADULT  Goal: Nutrient/Hydration intake appropriate for improving, restoring or maintaining nutritional needs  Description: Monitor and assess patient's nutrition/hydration status for malnutrition  Collaborate with interdisciplinary team and initiate plan and interventions as ordered  Monitor patient's weight and dietary intake as ordered or per policy  Utilize nutrition screening tool and intervene as necessary  Determine patient's food preferences and provide high-protein, high-caloric foods as appropriate       INTERVENTIONS:  - Monitor oral intake, urinary output, labs, and treatment plans  - Assess nutrition and hydration status and recommend course of action  - Evaluate amount of meals eaten  - Assist patient with eating if necessary   - Allow adequate time for meals  - Recommend/ encourage appropriate diets, oral nutritional supplements, and vitamin/mineral supplements  - Order, calculate, and assess calorie counts as needed  - Recommend, monitor, and adjust tube feedings and TPN/PPN based on assessed needs  - Assess need for intravenous fluids  - Provide specific nutrition/hydration education as appropriate  - Include patient/family/caregiver in decisions related to nutrition  Outcome: Progressing     Problem: Prexisting or High Potential for Compromised Skin Integrity  Goal: Skin integrity is maintained or improved  Description: INTERVENTIONS:  - Identify patients at risk for skin breakdown  - Assess and monitor skin integrity  - Assess and monitor nutrition and hydration status  - Monitor labs   - Assess for incontinence   - Turn and reposition patient  - Assist with mobility/ambulation  - Relieve pressure over bony prominences  - Avoid friction and shearing  - Provide appropriate hygiene as needed including keeping skin clean and dry  - Evaluate need for skin moisturizer/barrier cream  - Collaborate with interdisciplinary team   - Patient/family teaching  - Consider wound care consult   Outcome: Progressing     Problem: MOBILITY - ADULT  Goal: Maintain or return to baseline ADL function  Description: INTERVENTIONS:  -  Assess patient's ability to carry out ADLs; assess patient's baseline for ADL function and identify physical deficits which impact ability to perform ADLs (bathing, care of mouth/teeth, toileting, grooming, dressing, etc )  - Assess/evaluate cause of self-care deficits   - Assess range of motion  - Assess patient's mobility; develop plan if impaired  - Assess patient's need for assistive devices and provide as appropriate  - Encourage maximum independence but intervene and supervise when necessary  - Involve family in performance of ADLs  - Assess for home care needs following discharge   - Consider OT consult to assist with ADL evaluation and planning for discharge  - Provide patient education as appropriate  Outcome: Progressing  Goal: Maintains/Returns to pre admission functional level  Description: INTERVENTIONS:  - Perform BMAT or MOVE assessment daily    - Set and communicate daily mobility goal to care team and patient/family/caregiver  - Collaborate with rehabilitation services on mobility goals if consulted  - Perform Range of Motion 3 times a day  - Reposition patient every 2 hours    - Dangle patient 3 times a day  - Stand patient 3 times a day  - Ambulate patient 3 times a day  - Out of bed to chair 3 times a day   - Out of bed for meals 3 times a day  - Out of bed for toileting  - Record patient progress and toleration of activity level   Outcome: Progressing     Problem: Potential for Falls  Goal: Patient will remain free of falls  Description: INTERVENTIONS:  - Educate patient/family on patient safety including physical limitations  - Instruct patient to call for assistance with activity   - Consult OT/PT to assist with strengthening/mobility   - Keep Call bell within reach  - Keep bed low and locked with side rails adjusted as appropriate  - Keep care items and personal belongings within reach  - Initiate and maintain comfort rounds  - Make Fall Risk Sign visible to staff  - Offer Toileting every 2 Hours, in advance of need  - Initiate/Maintain bed alarm  - Obtain necessary fall risk management equipment: Alarm  - Apply yellow socks and bracelet for high fall risk patients  - Consider moving patient to room near nurses station  Outcome: Progressing     Problem: PAIN - ADULT  Goal: Verbalizes/displays adequate comfort level or baseline comfort level  Description: Interventions:  - Encourage patient to monitor pain and request assistance  - Assess pain using appropriate pain scale  - Administer analgesics based on type and severity of pain and evaluate response  - Implement non-pharmacological measures as appropriate and evaluate response  - Consider cultural and social influences on pain and pain management  - Notify physician/advanced practitioner if interventions unsuccessful or patient reports new pain  Outcome: Progressing     Problem: INFECTION - ADULT  Goal: Absence or prevention of progression during hospitalization  Description: INTERVENTIONS:  - Assess and monitor for signs and symptoms of infection  - Monitor lab/diagnostic results  - Monitor all insertion sites, i e  indwelling lines, tubes, and drains  - Monitor endotracheal if appropriate and nasal secretions for changes in amount and color  - [place] appropriate cooling/warming therapies per order  - Administer medications as ordered  - Instruct and encourage patient and family to use good hand hygiene technique  - Identify and instruct in appropriate isolation precautions for identified infection/condition  Outcome: Progressing  Goal: Absence of fever/infection during neutropenic period  Description: INTERVENTIONS:  - Monitor WBC    Outcome: Progressing     Problem: SAFETY ADULT  Goal: Maintain or return to baseline ADL function  Description: INTERVENTIONS:  -  Assess patient's ability to carry out ADLs; assess patient's baseline for ADL function and identify physical deficits which impact ability to perform ADLs (bathing, care of mouth/teeth, toileting, grooming, dressing, etc )  - Assess/evaluate cause of self-care deficits   - Assess range of motion  - Assess patient's mobility; develop plan if impaired  - Assess patient's need for assistive devices and provide as appropriate  - Encourage maximum independence but intervene and supervise when necessary  - Involve family in performance of ADLs  - Assess for home care needs following discharge   - Consider OT consult to assist with ADL evaluation and planning for discharge  - Provide patient education as appropriate  Outcome: Progressing  Goal: Maintains/Returns to pre admission functional level  Description: INTERVENTIONS:  - Perform BMAT or MOVE assessment daily    - Set and communicate daily mobility goal to care team and patient/family/caregiver  - Collaborate with rehabilitation services on mobility goals if consulted  - Perform Range of Motion 3 times a day  - Reposition patient every 2 hours    - Dangle patient 3 times a day  - Stand patient 3 times a day  - Ambulate patient 3 times a day  - Out of bed to chair 3 times a day   - Out of bed for meals 3 times a day  - Out of bed for toileting  - Record patient progress and toleration of activity level   Outcome: Progressing  Goal: Patient will remain free of falls  Description: INTERVENTIONS:  - Educate patient/family on patient safety including physical limitations  - Instruct patient to call for assistance with activity   - Consult OT/PT to assist with strengthening/mobility   - Keep Call bell within reach  - Keep bed low and locked with side rails adjusted as appropriate  - Keep care items and personal belongings within reach  - Initiate and maintain comfort rounds  - Make Fall Risk Sign visible to staff  - Offer Toileting every 2 Hours, in advance of need  - Initiate/Maintain bed alarm  - Obtain necessary fall risk management equipment: Alarm  - Apply yellow socks and bracelet for high fall risk patients  - Consider moving patient to room near nurses station  Outcome: Progressing     Problem: GENITOURINARY - ADULT  Goal: Maintains or returns to baseline urinary function  Description: INTERVENTIONS:  - Assess urinary function  - Encourage oral fluids to ensure adequate hydration if ordered  - Administer IV fluids as ordered to ensure adequate hydration  - Administer ordered medications as needed  - Offer frequent toileting  - Follow urinary retention protocol if ordered  Outcome: Progressing  Goal: Absence of urinary retention  Description: INTERVENTIONS:  - Assess patient’s ability to void and empty bladder  - Monitor I/O  - Bladder scan as needed  - Discuss with physician/AP medications to alleviate retention as needed  - Discuss catheterization for long term situations as appropriate  Outcome: Progressing  Goal: Urinary catheter remains patent  Description: INTERVENTIONS:  - Assess patency of urinary catheter  - If patient has a chronic wagner, consider changing catheter if non-functioning  - Follow guidelines for intermittent irrigation of non-functioning urinary catheter  Outcome: Progressing     Problem: HEMATOLOGIC - ADULT  Goal: Maintains hematologic stability  Description: INTERVENTIONS  - Assess for signs and symptoms of bleeding or hemorrhage  - Monitor labs  - Administer supportive blood products/factors as ordered and appropriate  Outcome: Progressing     Problem: MUSCULOSKELETAL - ADULT  Goal: Maintain or return mobility to safest level of function  Description: INTERVENTIONS:  - Assess patient's ability to carry out ADLs; assess patient's baseline for ADL function and identify physical deficits which impact ability to perform ADLs (bathing, care of mouth/teeth, toileting, grooming, dressing, etc )  - Assess/evaluate cause of self-care deficits   - Assess range of motion  - Assess patient's mobility  - Assess patient's need for assistive devices and provide as appropriate  - Encourage maximum independence but intervene and supervise when necessary  - Involve family in performance of ADLs  - Assess for home care needs following discharge   - Consider OT consult to assist with ADL evaluation and planning for discharge  - Provide patient education as appropriate  Outcome: Progressing  Goal: Maintain proper alignment of affected body part  Description: INTERVENTIONS:  - Support, maintain and protect limb and body alignment  - Provide patient/ family with appropriate education  Outcome: Progressing

## 2023-01-20 LAB
ALBUMIN SERPL BCP-MCNC: 3.1 G/DL (ref 3.5–5)
ALP SERPL-CCNC: 98 U/L (ref 34–104)
ALT SERPL W P-5'-P-CCNC: 43 U/L (ref 7–52)
ANION GAP SERPL CALCULATED.3IONS-SCNC: 7 MMOL/L (ref 4–13)
AST SERPL W P-5'-P-CCNC: 39 U/L (ref 13–39)
BACTERIA UR QL AUTO: ABNORMAL /HPF
BASOPHILS # BLD AUTO: 0.05 THOUSANDS/ÂΜL (ref 0–0.1)
BASOPHILS NFR BLD AUTO: 0 % (ref 0–1)
BILIRUB SERPL-MCNC: 0.21 MG/DL (ref 0.2–1)
BILIRUB UR QL STRIP: ABNORMAL
BUN SERPL-MCNC: 53 MG/DL (ref 5–25)
CALCIUM ALBUM COR SERPL-MCNC: 9.7 MG/DL (ref 8.3–10.1)
CALCIUM SERPL-MCNC: 9 MG/DL (ref 8.4–10.2)
CHLORIDE SERPL-SCNC: 101 MMOL/L (ref 96–108)
CLARITY UR: ABNORMAL
CO2 SERPL-SCNC: 26 MMOL/L (ref 21–32)
COLOR UR: ABNORMAL
CREAT SERPL-MCNC: 2.87 MG/DL (ref 0.6–1.3)
EOSINOPHIL # BLD AUTO: 0.98 THOUSAND/ÂΜL (ref 0–0.61)
EOSINOPHIL NFR BLD AUTO: 8 % (ref 0–6)
ERYTHROCYTE [DISTWIDTH] IN BLOOD BY AUTOMATED COUNT: 17 % (ref 11.6–15.1)
GFR SERPL CREATININE-BSD FRML MDRD: 19 ML/MIN/1.73SQ M
GLUCOSE SERPL-MCNC: 148 MG/DL (ref 65–140)
GLUCOSE SERPL-MCNC: 166 MG/DL (ref 65–140)
GLUCOSE SERPL-MCNC: 235 MG/DL (ref 65–140)
GLUCOSE SERPL-MCNC: 321 MG/DL (ref 65–140)
GLUCOSE UR STRIP-MCNC: NEGATIVE MG/DL
HCT VFR BLD AUTO: 25.9 % (ref 36.5–49.3)
HGB BLD-MCNC: 8.2 G/DL (ref 12–17)
HGB UR QL STRIP.AUTO: ABNORMAL
IMM GRANULOCYTES # BLD AUTO: 0.07 THOUSAND/UL (ref 0–0.2)
IMM GRANULOCYTES NFR BLD AUTO: 1 % (ref 0–2)
KETONES UR STRIP-MCNC: ABNORMAL MG/DL
LEUKOCYTE ESTERASE UR QL STRIP: ABNORMAL
LYMPHOCYTES # BLD AUTO: 2.14 THOUSANDS/ÂΜL (ref 0.6–4.47)
LYMPHOCYTES NFR BLD AUTO: 17 % (ref 14–44)
MAGNESIUM SERPL-MCNC: 2 MG/DL (ref 1.9–2.7)
MCH RBC QN AUTO: 29.2 PG (ref 26.8–34.3)
MCHC RBC AUTO-ENTMCNC: 31.7 G/DL (ref 31.4–37.4)
MCV RBC AUTO: 92 FL (ref 82–98)
MONOCYTES # BLD AUTO: 0.92 THOUSAND/ÂΜL (ref 0.17–1.22)
MONOCYTES NFR BLD AUTO: 7 % (ref 4–12)
NEUTROPHILS # BLD AUTO: 8.34 THOUSANDS/ÂΜL (ref 1.85–7.62)
NEUTS SEG NFR BLD AUTO: 67 % (ref 43–75)
NITRITE UR QL STRIP: POSITIVE
NON-SQ EPI CELLS URNS QL MICRO: ABNORMAL /HPF
NRBC BLD AUTO-RTO: 0 /100 WBCS
PH UR STRIP.AUTO: 5 [PH]
PHOSPHATE SERPL-MCNC: 4.2 MG/DL (ref 2.3–4.1)
PLATELET # BLD AUTO: 259 THOUSANDS/UL (ref 149–390)
PMV BLD AUTO: 9.1 FL (ref 8.9–12.7)
POTASSIUM SERPL-SCNC: 5.3 MMOL/L (ref 3.5–5.3)
PROT SERPL-MCNC: 7.1 G/DL (ref 6.4–8.4)
PROT UR STRIP-MCNC: >=300 MG/DL
RBC # BLD AUTO: 2.81 MILLION/UL (ref 3.88–5.62)
RBC #/AREA URNS AUTO: ABNORMAL /HPF
SODIUM SERPL-SCNC: 134 MMOL/L (ref 135–147)
SP GR UR STRIP.AUTO: 1.01 (ref 1–1.03)
UROBILINOGEN UR QL STRIP.AUTO: 2 E.U./DL
WBC # BLD AUTO: 12.5 THOUSAND/UL (ref 4.31–10.16)
WBC #/AREA URNS AUTO: ABNORMAL /HPF

## 2023-01-20 RX ADMIN — AMPICILLIN SODIUM 2000 MG: 2 INJECTION, POWDER, FOR SOLUTION INTRAMUSCULAR; INTRAVENOUS at 14:39

## 2023-01-20 RX ADMIN — ALPRAZOLAM 0.25 MG: 0.25 TABLET ORAL at 01:54

## 2023-01-20 RX ADMIN — DIPHENHYDRAMINE HCL 25 MG: 25 TABLET, COATED ORAL at 00:57

## 2023-01-20 RX ADMIN — METOPROLOL TARTRATE 25 MG: 25 TABLET, FILM COATED ORAL at 20:53

## 2023-01-20 RX ADMIN — OXYBUTYNIN CHLORIDE 5 MG: 5 TABLET ORAL at 16:37

## 2023-01-20 RX ADMIN — HYDROMORPHONE HYDROCHLORIDE 0.2 MG: 0.2 INJECTION, SOLUTION INTRAMUSCULAR; INTRAVENOUS; SUBCUTANEOUS at 20:53

## 2023-01-20 RX ADMIN — AMPICILLIN SODIUM 2000 MG: 2 INJECTION, POWDER, FOR SOLUTION INTRAMUSCULAR; INTRAVENOUS at 01:55

## 2023-01-20 RX ADMIN — METOPROLOL TARTRATE 25 MG: 25 TABLET, FILM COATED ORAL at 08:59

## 2023-01-20 RX ADMIN — OXYCODONE HYDROCHLORIDE 10 MG: 10 TABLET ORAL at 08:59

## 2023-01-20 RX ADMIN — OXYBUTYNIN CHLORIDE 5 MG: 5 TABLET ORAL at 08:59

## 2023-01-20 RX ADMIN — OXYBUTYNIN CHLORIDE 5 MG: 5 TABLET ORAL at 20:54

## 2023-01-20 RX ADMIN — CYANOCOBALAMIN TAB 500 MCG 1000 MCG: 500 TAB at 08:59

## 2023-01-20 RX ADMIN — EZETIMIBE 10 MG: 10 TABLET ORAL at 08:59

## 2023-01-20 RX ADMIN — OXYCODONE HYDROCHLORIDE 10 MG: 10 TABLET ORAL at 16:37

## 2023-01-20 RX ADMIN — PANTOPRAZOLE SODIUM 20 MG: 20 TABLET, DELAYED RELEASE ORAL at 05:59

## 2023-01-20 RX ADMIN — CALCITRIOL 0.25 MCG: 0.25 CAPSULE, LIQUID FILLED ORAL at 08:59

## 2023-01-20 RX ADMIN — GABAPENTIN 300 MG: 300 CAPSULE ORAL at 20:54

## 2023-01-20 RX ADMIN — ARIPIPRAZOLE 2 MG: 2 TABLET ORAL at 08:59

## 2023-01-20 RX ADMIN — INSULIN GLARGINE 15 UNITS: 100 INJECTION, SOLUTION SUBCUTANEOUS at 20:54

## 2023-01-20 RX ADMIN — BIMATOPROST 1 DROP: 0.1 SOLUTION/ DROPS OPHTHALMIC at 20:52

## 2023-01-20 RX ADMIN — FUROSEMIDE 20 MG: 20 TABLET ORAL at 08:59

## 2023-01-20 NOTE — PROGRESS NOTES
[address] [facility]  Progress Note - Mayda Redmond 1945, 76 y o  male MRN: [id number]  Unit/Bed#: E5 -01 Encounter: [id number]  Primary Care Provider: Zaira Feliciano MD   Date and time admitted to hospital: 1/7/2023 10:17 PM    Severe protein-calorie malnutrition [facility])  Assessment & Plan  Malnutrition Findings:   Adult Malnutrition type: Chronic illness  Adult Degree of Malnutrition: Other severe protein calorie malnutrition  Malnutrition Characteristics: Weight loss, Muscle loss, Fat loss                  360 Statement: related to inadequate energy intake as evidenced by 6% weight loss 12/20/22-1/11/23, hollowing of supraclavicular space, wasted interosseous, hollowing orbital  Intervention CCD diet, medical food supplements with meals  BMI Findings: Body mass index is 23 51 kg/m²  Acute on chronic blood loss anemia  Assessment & Plan  · History of hematuria due to bladder cancer  · Hg 7 1 prior to transfer  Received 1 unit PRBC  · Iron panel showing continued deficiency  · Seen in consult by hematology oncology    Hemoglobin 8 2 > 8 1 > 8 0 > 7 9 > 8 >7 7>8 8>9 1>8 3>    Awaiting morning labs today, transfuse as needed    Nephrostomy status ([facility] [address] )  Assessment & Plan  · Chronic bilateral nephrostomy tubes  · Prior to transfer patient reported abdominal pain, CT was done and shows: Stable bilateral percutaneous nephrostomy tubes with interval placement of a right double-J nephroureteral stent  Stable mild right upper pole caliectasis, with resolution of previously seen lower pole caliectasis and hydroureter  Persistent blood   · Appreciate urology recommendations  · Monitor I&O    Elevated brain natriuretic peptide (BNP) level  Assessment & Plan  · BNP >2000   EF 55%, normal wall motion  · CT small stable left pleural effusion with drainage catheter in place  · Continue lasix 20 mg daily with hold parameters  · Monitor daily weight-stable  · Repeat echocardiogram noted    Chronic pleural effusion  Assessment & Plan  · Chronic pleural effusion  CT showing Stable small left pleural effusion with drainage catheter in place  Stable epicardial metastasis  · 1/18-chest CTA obtained for routine surveillance; did show increased fluid on left side  Patient with Pleurx catheter and nursing drained it; only 5 cc extracted  Monitor    Stage 4 chronic kidney disease [facility])  Assessment & Plan  Lab Results   Component Value Date    EGFR 20 01/19/2023    EGFR 20 01/18/2023    EGFR 18 01/17/2023    CREATININE 2 75 (H) 01/19/2023    CREATININE 2 85 (H) 01/18/2023    CREATININE 3 12 (H) 01/17/2023     · Chronic CKD 4;  baseline appears to be around 3 0-3 4  Monitor BMP    Continuous opioid dependence (HCC)  Assessment & Plan  · Maintained on oxycodone 10mg TID prn, verified on PDMP    Gross hematuria  Assessment & Plan  · Patient has persistent hematuria due to bladder cancer  History of recurrent UTI on suppressive Bactrim  · CT A/P: Stable bladder wall thickening, compatible with known malignancy  Increased hyperdensity within the bladder lumen, compatible with new blood clot  · Recent admission at [facility] 12/12-12/22 for hematuria and obstructive uropathy in the setting of known bladder cancer s/p BCG and subsequent chemotherapy/radiation s/p bilateral chronic PCN tubes  Noted to have new right-sided hydronephrosis on CT s/p cystoscopy, clot evacuation w/ right ureteral stenting on 12/14 by Dr Chen Pen  · UA bloody, innumerable RBC/WBC, +leuks, no nitrites    Patient initially was on CBI per urology  Now transition to hand irrigation several times per day  Urology does not feel patient needs anything other than this right now    He unfortunately is likely have chronic hematuria        Malignant neoplasm of anterior wall of urinary bladder ([facility] [address] )  Assessment & Plan  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, recurrence with CIS in 2016 BCG again  BCG refractory disease with pelvic metastases now s/p chemoradiation 2021, on current immunotherapy  · Chronic B/L PCN tubes  · CT A/P Jan 7: Stable bladder wall thickening, compatible with known malignancy  Stable metastatic retroperitoneal lymphadenopathy  Stable epicardial metastasis  · Per oncology note on Jan 6: Chemotherapy treatment should remain on hold for now untill he is clinically stable/discharged; particularly since he was positive flu  To follow-up with primary oncologist after discharge for reevaluation prior to resume treatment  · Port removed 2/2 bacteremia  · At this time, patient has stage IV metastatic bladder cancer with the following findings on recent CT:    "Progression of pleural-based tumor in the left hemithorax, even when compared to recent abdominopelvic CT examination of just 10 days earlier  Small loculated left pleural collection, increased in size when compared to October 4, 2022 despite the   presence of pleural drainage catheter      Hypodensities in the right hepatic lobe highly suspicious for developing metastatic disease, new from October 2022  increased in size from examination of just 10 days earlier "    Working with patient and family to define expectations and provide optimal treatment  Patient stable from urology point of view for discharge with indwelling Shirley catheter and manual irrigation until followed up in the office  We will attempt to arrange advanced care planning meeting; follows with palliative care in the outpatient setting  No longer septic; labs and vitals largely stable      Type 2 diabetes mellitus, with long-term current use of insulin (HCC)  Assessment & Plan  Lab Results   Component Value Date    HGBA1C 7 2 (H) 10/20/2022     · Currently receiving Lantus 15 units with sliding scale TID  · ADA diet  · Family reports allergy to humalog, so that is not being used    Recent Labs     01/19/23  1104 01/19/23  1604 01/19/23  2101 01/20/23  0708   POCGLU 163* 241* 420* 148*       Blood Sugar Average: Last 72 hrs:  (P) 864 [id number]    Coronary artery disease of native artery of native heart with stable angina pectoris (HCC)  Assessment & Plan  · Outpatient regimen ASA, Imdur, metoprolol, zetia  · Repeat echocardiogram noted; EF 55% with grade 1 diastolic dysfunction    * Bacteremia due to Enterobacter species  Assessment & Plan  · Patient was transferred from [address] [place] [address] due to fever and positive blood cultures  · Blood culture growing Enterobacter  · His Port-A-Cath was thought to be the source  · Port-A-Cath removed by interventional radiology  · Repeat blood cultures no growth at 72 hours  · Spoke with ID  They are changing Cefepime to oral levaquin--->> patient initially completed antibiotic therapy as of 1/17    Loop recorder is not in blood stream and is not thought to be infected  Appreciate ID following along; have spoken with patient's family on 1/18; will reinitiate amoxicillin over concerns for elevated white count  May repeat CT abdomen with contrast     Repeat CT abdomen pelvis with no acute findings; redemonstrated bladder cancer with Shirley catheter insertion and stable metastatic disease in liver  Patient had repeat UA done today; has nitrite positive urine with innumerable bacteria; but likely colonized as sample taken from Shirley catheter  Consideration for discontinuing Shirley catheter and obtaining sample not through the catheter was had; but without every 4 hours irrigation, patient at very serious risk of developing blood clots of the bladder and potentially causing more harm than good  Restarted on antibiotics by infectious disease; blood cultures obtained yesterday and pending  Tonight had discussion at bedside with patient and his wife and granddaughter Germaine Rosen, who attended via phone; patient would prefer to go to [employer] pending availability          VTE Pharmacologic Prophylaxis:   Pharmacologic: Pharmacologic VTE Prophylaxis contraindicated due to Hematuria  Mechanical VTE Prophylaxis in Place: Yes    Patient Centered Rounds: I have performed bedside rounds with nursing staff today  Discussions with Specialists or Other Care Team Provider: Infectious disease    Education and Discussions with Family / Patient: Discussed treatment plan with family and patient who agree with current plan; encouraged to ask questions and participate  Time Spent for Care: 1 hour  More than 50% of total time spent on counseling and coordination of care as described above  Current Length of Stay: 13 day(s)    Current Patient Status: Inpatient   Certification Statement: The patient will continue to require additional inpatient hospital stay due to Hematuria    Discharge Plan: To be determined    Code Status: Level 1 - Full Code      Subjective:   Patient seen and examined bedside, no acute distress or discomfort noted  Labs obtained and reviewed; hemoglobin stable  Had long discussion with infectious disease regarding care plan; repeat UA obtained today at family request  Family also requesting that Shirley catheter in this bladder cancer patient be discontinued so that a fresh urine sample could be obtained  However, very concerning as patient still with ongoing slow bleed from a stage IV bladder cancer lesion that has undergone recent cystoscopy  In fact, patient underwent cystoscopy with clot evacuation on 12/14/2022; so he has demonstrated a propensity to have clotting and is likely to again without irrigation  Instructions from urology team state the following:  "Continue q4h hand irrigation or sooner if needed for tube patency and continue upon discharge  Continue home rx ditropan 5mg TID for bladder spasm  Unfortunately he will likely continue to have hematuria as a result of his indwelling ureteral stent (for hydroureteronephrosis despite pcn) in combination with radiation cystitis (bladder radiation 2021)   Hopeful this will improve if removal of the ureteral stent at the next planned interval as long as his kidney function does okay without the stent "    Clearly, removing the Shirley catheter, even if for a day, would put patient at unreasonable risk for repeat clot formation  what's more, patient has had radiation cystitis and currently has indwelling ureteral stent; trauma from Shirley catheter removal and insertion would also place patient at higher risk  Understand family's wish to ensure patient has no active infection and agree the patient has leukocytosis, but leukocytosis may also be attributed to underlying stage IV metastatic cancer  He is 1/4 SIRS criteria and is clearly not septic at this point  Appreciate ID recommendations; ID provider has discussed case with 2 other ID providers to get second and third opinions  Currently waiting for urine cultures; blood cultures now negative X2 at 24 hours  On discussion with patient and family today, it appears they have obtained a bed at [facility]; plan would be to monitor cultures, dose antibiotics as appropriate, and discharge to follow on rehab on Monday  Objective:     Vitals:   Temp (24hrs), Av 6 °F (37 °C), Min:97 8 °F (36 6 °C), Max:99 2 °F (37 3 °C)    Temp:  [97 8 °F (36 6 °C)-99 2 °F (37 3 °C)] 97 8 °F (36 6 °C)  HR:  [101-104] 104  Resp:  [16-20] 20  BP: (111-153)/(62-90) 111/62  SpO2:  [96 %-99 %] 98 %  Body mass index is 23 51 kg/m²  Input and Output Summary (last 24 hours): Intake/Output Summary (Last 24 hours) at 2023 1826  Last data filed at 2023 1200  Gross per 24 hour   Intake 30 ml   Output 900 ml   Net -870 ml       Physical Exam:     Physical Exam  Vitals and nursing note reviewed  Constitutional:       General: He is not in acute distress  Appearance: He is well-developed  HENT:      Head: Normocephalic and atraumatic     Eyes:      Conjunctiva/sclera: Conjunctivae normal    Cardiovascular:      Rate and Rhythm: Normal rate       Heart sounds: No murmur heard  Pulmonary:      Effort: Pulmonary effort is normal  No respiratory distress  Comments: Left-sided Pleurx catheter in place  Abdominal:      Palpations: Abdomen is soft  Tenderness: There is no abdominal tenderness  Genitourinary:     Comments: Shirley catheter in place; bilateral nephrostomy tubes  Musculoskeletal:         General: No swelling  Cervical back: Neck supple  Right lower leg: Edema present  Left lower leg: Edema present  Skin:     General: Skin is warm and dry  Neurological:      Mental Status: He is alert and oriented to person, place, and time  Psychiatric:         Mood and Affect: Mood normal            Additional Data:     Labs:    Results from last 7 days   Lab Units 01/20/23  1311   WBC Thousand/uL 12 50*   HEMOGLOBIN g/dL 8 2*   HEMATOCRIT % 25 9*   PLATELETS Thousands/uL 259   NEUTROS PCT % 67   LYMPHS PCT % 17   MONOS PCT % 7   EOS PCT % 8*     Results from last 7 days   Lab Units 01/20/23  1311   SODIUM mmol/L 134*   POTASSIUM mmol/L 5 3   CHLORIDE mmol/L 101   CO2 mmol/L 26   BUN mg/dL 53*   CREATININE mg/dL 2 87*   ANION GAP mmol/L 7   CALCIUM mg/dL 9 0   ALBUMIN g/dL 3 1*   TOTAL BILIRUBIN mg/dL 0 21   ALK PHOS U/L 98   ALT U/L 43   AST U/L 39   GLUCOSE RANDOM mg/dL 166*         Results from last 7 days   Lab Units 01/20/23  1057 01/20/23  0708 01/19/23  2101 01/19/23  1604 01/19/23  1104 01/19/23  0715 01/18/23  2125 01/18/23  1611 01/18/23  1124 01/18/23  0728 01/17/23  2057 01/17/23  1526   POC GLUCOSE mg/dl 235* 148* 420* 241* 163* 123 228* 200* 110 139 332* 207*                   * I Have Reviewed All Lab Data Listed Above  * Additional Pertinent Lab Tests Reviewed:  All Labs Within Last 24 Hours Reviewed    Imaging:    Imaging Reports Reviewed Today Include: CT abdomen pelvis  Imaging Personally Reviewed by Myself Includes:      Recent Cultures (last 7 days):     Results from last 7 days   Lab Units 01/19/23  1202 01/19/23  1023   BLOOD CULTURE  No Growth at 24 hrs  No Growth at 24 hrs  Last 24 Hours Medication List:   Current Facility-Administered Medications   Medication Dose Route Frequency Provider Last Rate   • ALPRAZolam  0 25 mg Oral BID PRN EVANGELINA Perez     • ampicillin  2,000 mg Intravenous Q12H Nancy Coleman MD 2,000 mg (01/20/23 1579)   • ARIPiprazole  2 mg Oral Daily EVANGELINA Perez     • azelastine  1 spray Each Nare BID PRN EVANGELINA Perez     • bimatoprost  1 drop Both Eyes HS EVANGELINA Perez     • calcitriol  0 25 mcg Oral Daily EVANGELINA Perez     • cyanocobalamin  1,000 mcg Oral Daily EVANGELINA Perez     • diphenhydrAMINE  25 mg Oral Q6H PRN Monika Erickson MD     • ezetimibe  10 mg Oral Daily EVANGELINA Perez     • furosemide  20 mg Oral Daily EVANGELINA Perez     • gabapentin  300 mg Oral HS EVANGELINA Perez     • HYDROmorphone  0 2 mg Intravenous 4x Daily PRN EVANGELINA Perez     • insulin glargine  15 Units Subcutaneous HS Lorna Luna PA-C     • melatonin  3 mg Oral HS PRN Reina Baker PA-C     • metoprolol tartrate  25 mg Oral Q12H [facility], CRNP     • ondansetron  4 mg Intravenous Q6H PRN EVANGELINA Perez     • oxybutynin  5 mg Oral TID Roy Gosselin, CRNP     • oxyCODONE  10 mg Oral TID PRN EVANGELINA Perez     • pantoprazole  20 mg Oral Early Morning EVANGELINA Perez     • polyethylene glycol  17 g Oral Daily PRN EVANGELINA Perez     • senna  2 tablet Oral BID PRN Mare Mcclelland DO     • simethicone  80 mg Oral 4x Daily PRN EVANGELINA Perez          Today, Patient Was Seen By: Ashia Lewis MD    ** Please Note: Dictation voice to text software may have been used in the creation of this document   **

## 2023-01-20 NOTE — PLAN OF CARE
Problem: Nutrition/Hydration-ADULT  Goal: Nutrient/Hydration intake appropriate for improving, restoring or maintaining nutritional needs  Description: Monitor and assess patient's nutrition/hydration status for malnutrition  Collaborate with interdisciplinary team and initiate plan and interventions as ordered  Monitor patient's weight and dietary intake as ordered or per policy  Utilize nutrition screening tool and intervene as necessary  Determine patient's food preferences and provide high-protein, high-caloric foods as appropriate       INTERVENTIONS:  - Monitor oral intake, urinary output, labs, and treatment plans  - Assess nutrition and hydration status and recommend course of action  - Evaluate amount of meals eaten  - Assist patient with eating if necessary   - Allow adequate time for meals  - Recommend/ encourage appropriate diets, oral nutritional supplements, and vitamin/mineral supplements  - Order, calculate, and assess calorie counts as needed  - Recommend, monitor, and adjust tube feedings and TPN/PPN based on assessed needs  - Assess need for intravenous fluids  - Provide specific nutrition/hydration education as appropriate  - Include patient/family/caregiver in decisions related to nutrition  Outcome: Progressing     Problem: Prexisting or High Potential for Compromised Skin Integrity  Goal: Skin integrity is maintained or improved  Description: INTERVENTIONS:  - Identify patients at risk for skin breakdown  - Assess and monitor skin integrity  - Assess and monitor nutrition and hydration status  - Monitor labs   - Assess for incontinence   - Turn and reposition patient  - Assist with mobility/ambulation  - Relieve pressure over bony prominences  - Avoid friction and shearing  - Provide appropriate hygiene as needed including keeping skin clean and dry  - Evaluate need for skin moisturizer/barrier cream  - Collaborate with interdisciplinary team   - Patient/family teaching  - Consider wound care consult   Outcome: Progressing     Problem: MOBILITY - ADULT  Goal: Maintain or return to baseline ADL function  Description: INTERVENTIONS:  -  Assess patient's ability to carry out ADLs; assess patient's baseline for ADL function and identify physical deficits which impact ability to perform ADLs (bathing, care of mouth/teeth, toileting, grooming, dressing, etc )  - Assess/evaluate cause of self-care deficits   - Assess range of motion  - Assess patient's mobility; develop plan if impaired  - Assess patient's need for assistive devices and provide as appropriate  - Encourage maximum independence but intervene and supervise when necessary  - Involve family in performance of ADLs  - Assess for home care needs following discharge   - Consider OT consult to assist with ADL evaluation and planning for discharge  - Provide patient education as appropriate  Outcome: Progressing  Goal: Maintains/Returns to pre admission functional level  Description: INTERVENTIONS:  - Perform BMAT or MOVE assessment daily    - Set and communicate daily mobility goal to care team and patient/family/caregiver  - Collaborate with rehabilitation services on mobility goals if consulted  - Perform Range of Motion 3 times a day  - Reposition patient every 2 hours    - Dangle patient 3 times a day  - Stand patient 3 times a day  - Ambulate patient 3 times a day  - Out of bed to chair 3 times a day   - Out of bed for meals 3 times a day  - Out of bed for toileting  - Record patient progress and toleration of activity level   Outcome: Progressing     Problem: Potential for Falls  Goal: Patient will remain free of falls  Description: INTERVENTIONS:  - Educate patient/family on patient safety including physical limitations  - Instruct patient to call for assistance with activity   - Consult OT/PT to assist with strengthening/mobility   - Keep Call bell within reach  - Keep bed low and locked with side rails adjusted as appropriate  - Keep care items and personal belongings within reach  - Initiate and maintain comfort rounds  - Make Fall Risk Sign visible to staff  - Offer Toileting every 2 Hours, in advance of need  - Initiate/Maintain bed alarm  - Obtain necessary fall risk management equipment: Alarm  - Apply yellow socks and bracelet for high fall risk patients  - Consider moving patient to room near nurses station  Outcome: Progressing     Problem: PAIN - ADULT  Goal: Verbalizes/displays adequate comfort level or baseline comfort level  Description: Interventions:  - Encourage patient to monitor pain and request assistance  - Assess pain using appropriate pain scale  - Administer analgesics based on type and severity of pain and evaluate response  - Implement non-pharmacological measures as appropriate and evaluate response  - Consider cultural and social influences on pain and pain management  - Notify physician/advanced practitioner if interventions unsuccessful or patient reports new pain  Outcome: Progressing     Problem: INFECTION - ADULT  Goal: Absence or prevention of progression during hospitalization  Description: INTERVENTIONS:  - Assess and monitor for signs and symptoms of infection  - Monitor lab/diagnostic results  - Monitor all insertion sites, i e  indwelling lines, tubes, and drains  - Monitor endotracheal if appropriate and nasal secretions for changes in amount and color  - [place] appropriate cooling/warming therapies per order  - Administer medications as ordered  - Instruct and encourage patient and family to use good hand hygiene technique  - Identify and instruct in appropriate isolation precautions for identified infection/condition  Outcome: Progressing  Goal: Absence of fever/infection during neutropenic period  Description: INTERVENTIONS:  - Monitor WBC    Outcome: Progressing     Problem: SAFETY ADULT  Goal: Maintain or return to baseline ADL function  Description: INTERVENTIONS:  -  Assess patient's ability to carry out ADLs; assess patient's baseline for ADL function and identify physical deficits which impact ability to perform ADLs (bathing, care of mouth/teeth, toileting, grooming, dressing, etc )  - Assess/evaluate cause of self-care deficits   - Assess range of motion  - Assess patient's mobility; develop plan if impaired  - Assess patient's need for assistive devices and provide as appropriate  - Encourage maximum independence but intervene and supervise when necessary  - Involve family in performance of ADLs  - Assess for home care needs following discharge   - Consider OT consult to assist with ADL evaluation and planning for discharge  - Provide patient education as appropriate  Outcome: Progressing  Goal: Maintains/Returns to pre admission functional level  Description: INTERVENTIONS:  - Perform BMAT or MOVE assessment daily    - Set and communicate daily mobility goal to care team and patient/family/caregiver  - Collaborate with rehabilitation services on mobility goals if consulted  - Perform Range of Motion 3 times a day  - Reposition patient every 2 hours    - Dangle patient 3 times a day  - Stand patient 3 times a day  - Ambulate patient 3 times a day  - Out of bed to chair 3 times a day   - Out of bed for meals 3 times a day  - Out of bed for toileting  - Record patient progress and toleration of activity level   Outcome: Progressing  Goal: Patient will remain free of falls  Description: INTERVENTIONS:  - Educate patient/family on patient safety including physical limitations  - Instruct patient to call for assistance with activity   - Consult OT/PT to assist with strengthening/mobility   - Keep Call bell within reach  - Keep bed low and locked with side rails adjusted as appropriate  - Keep care items and personal belongings within reach  - Initiate and maintain comfort rounds  - Make Fall Risk Sign visible to staff  - Offer Toileting every 2 Hours, in advance of need  - Initiate/Maintain bed alarm  - Obtain necessary fall risk management equipment: Alarm  - Apply yellow socks and bracelet for high fall risk patients  - Consider moving patient to room near nurses station  Outcome: Progressing     Problem: GENITOURINARY - ADULT  Goal: Maintains or returns to baseline urinary function  Description: INTERVENTIONS:  - Assess urinary function  - Encourage oral fluids to ensure adequate hydration if ordered  - Administer IV fluids as ordered to ensure adequate hydration  - Administer ordered medications as needed  - Offer frequent toileting  - Follow urinary retention protocol if ordered  Outcome: Progressing  Goal: Absence of urinary retention  Description: INTERVENTIONS:  - Assess patient’s ability to void and empty bladder  - Monitor I/O  - Bladder scan as needed  - Discuss with physician/AP medications to alleviate retention as needed  - Discuss catheterization for long term situations as appropriate  Outcome: Progressing  Goal: Urinary catheter remains patent  Description: INTERVENTIONS:  - Assess patency of urinary catheter  - If patient has a chronic wagner, consider changing catheter if non-functioning  - Follow guidelines for intermittent irrigation of non-functioning urinary catheter  Outcome: Progressing     Problem: HEMATOLOGIC - ADULT  Goal: Maintains hematologic stability  Description: INTERVENTIONS  - Assess for signs and symptoms of bleeding or hemorrhage  - Monitor labs  - Administer supportive blood products/factors as ordered and appropriate  Outcome: Progressing     Problem: MUSCULOSKELETAL - ADULT  Goal: Maintain or return mobility to safest level of function  Description: INTERVENTIONS:  - Assess patient's ability to carry out ADLs; assess patient's baseline for ADL function and identify physical deficits which impact ability to perform ADLs (bathing, care of mouth/teeth, toileting, grooming, dressing, etc )  - Assess/evaluate cause of self-care deficits   - Assess range of motion  - Assess patient's mobility  - Assess patient's need for assistive devices and provide as appropriate  - Encourage maximum independence but intervene and supervise when necessary  - Involve family in performance of ADLs  - Assess for home care needs following discharge   - Consider OT consult to assist with ADL evaluation and planning for discharge  - Provide patient education as appropriate  Outcome: Progressing  Goal: Maintain proper alignment of affected body part  Description: INTERVENTIONS:  - Support, maintain and protect limb and body alignment  - Provide patient/ family with appropriate education  Outcome: Progressing

## 2023-01-20 NOTE — ASSESSMENT & PLAN NOTE
Lab Results   Component Value Date    HGBA1C 7 2 (H) 10/20/2022     · Currently receiving Lantus 15 units with sliding scale TID  · ADA diet  · Family reports allergy to humalog, so that is not being used    Recent Labs     01/19/23  1104 01/19/23  1604 01/19/23  2101 01/20/23  0708   POCGLU 163* 241* 420* 148*       Blood Sugar Average: Last 72 hrs:  (P) 771 [id number]

## 2023-01-20 NOTE — ASSESSMENT & PLAN NOTE
Malnutrition Findings:   Adult Malnutrition type: Chronic illness  Adult Degree of Malnutrition: Other severe protein calorie malnutrition  Malnutrition Characteristics: Weight loss, Muscle loss, Fat loss                  360 Statement: related to inadequate energy intake as evidenced by 6% weight loss 12/20/22-1/11/23, hollowing of supraclavicular space, wasted interosseous, hollowing orbital  Intervention CCD diet, medical food supplements with meals  BMI Findings: Body mass index is 23 51 kg/m²

## 2023-01-20 NOTE — PROGRESS NOTES
Progress Note - Infectious Disease   Flordia Barthel 76 y o  male MRN: [id number]  Unit/Bed#: E5 -01 Encounter: [id number]      Impression/Plan:  1   Persistent leukocytosis   Patient recently seen by our service and treated for problem 5  [facility] continues to have mild fluctuating leukocytosis with peripheral eosinophilia   Suspicion remains that this is inflammatory in nature given problem 2   Consider also medication side effect given peripheral eosinophilia  Currently on ampicillin after detailed discussion on 1/18  Blood culture so far without growth  Conversation again on 1/19 with patient's granddaughter remains insistence to pursue diagnosis/treatment of organisms isolated in his urine despite detailed explanation  Case discussed with multiple members of the patient's team   He remains largely stable, exam unchanged and labs and vitals are stable  Continue ampicillin 2 g every 12 hours, renally dosed, for now  Continue to trend fever curve/vitals  Repeat CBC and chemistry ordered for tomorrow  Maintain off of Tylenol  Urine cultures ordered after discussion with infection control/urology  Tentative plans for care team meeting with family next week  Monitor for any progressive symptoms  Recommend detailed review of patient's other medications  Follow-up pending blood cultures for completeness  Additional supportive care as per primary  Will adjust/add additional antibiotics based on patient's urine cultures     2   Metastatic high-grade treatment refractory bladder cancer   Patient unfortunately with progressive metastatic disease now also seen on CT imaging   Also having chronic/persistent hematuria   I suspect that this is contributing to the above  Antibiotic as above for now  Plans for escalated team meeting next week    Recommend follow-up with oncology as outpatient  Continue to trend fever curve/vitals  Repeat labs ordered for tomorrow     3   Chronic kidney disease   Significant kidney dysfunction at baseline   Creatinine clearance calculated and this does affect antibiotic dosing  Ampicillin dosing as above  Repeat chemistry tomorrow  We will further dose adjust as needed  Fluid hydration as per primary  Avoid nephrotoxic agents     4   Acute on chronic anemia   This is related to problem #2   Ongoing hemoglobin monitoring and transfusional support as per primary     5   Recently treated Enterobacter bacteremia   Patient completed treatment 1/18   Levaquin which was given also covered ESBL E  coli that had previously been isolated in the patient's urine   Plans for targeted therapy towards Enterococcus as above      Above plan discussed in detail with the patient, nursing, primary service, quality, infection prevention and senior ID colleague      I have spent 50 minutes with Patient, other providers and in review of records today in completing this visit  Total time spent included review of testing, ordering tests, communicating with other providers, documentation time  ID consult service will formally reevaluate this patient again on Monday unless new culture data in the interim  Please contact ID attending on call if questions  Antibiotics:  Ampicillin 3    24 Hour events:  Yesterday and overnight notes reviewed and no acute events noted    Subjective:  Patient seen at bedside and he denied having any nausea, vomiting, chest pain, shortness of breath  Still having areas of itching throughout the skin  Denies having any pain around his percutaneous nephrostomy tubes  Reporting only some bladder spasm  Tolerating current antibiotic otherwise  The patient states that he feels "comfortable"  He recognizes that he does feel overall weak and fatigued, but does not feel today is too bad  Patient's case was discussed with primary provider, infection control, quality and to Senior ID colleagues to review conversation yesterday as well as potential next steps    We agreed upon repeating urine culture from a fresh collection from his current catheter  No plans at this time to change or manipulate his catheter so as to avoid further harm  Continuing ampicillin otherwise as previously discussed with patient's family and given his current stability  Overall suspicion remains low for an ongoing urinary tract infection  Plans are for more detailed discussion with family to clearly establish thought process, next steps and goals of care, likely early next week    Objective:  Vitals:  Temp:  [98 1 °F (36 7 °C)-99 2 °F (37 3 °C)] 99 2 °F (37 3 °C)  HR:  [] 103  Resp:  [16-18] 16  BP: (130-153)/(73-90) 147/85  SpO2:  [96 %-99 %] 99 %  Temp (24hrs), Av 7 °F (37 1 °C), Min:98 1 °F (36 7 °C), Max:99 2 °F (37 3 °C)  Current: Temperature: 99 2 °F (37 3 °C)    Physical Exam:   General Appearance:  Alert, interactive, nontoxic, no acute distress  Chronically ill-appearing and frail  Throat: Oropharynx moist without lesions  Lungs:   Clear to auscultation bilaterally; no wheezes, rhonchi or rales; respirations unlabored on room air   Heart:  RRR; no murmur, rub or gallop appreciated   Abdomen:   Soft, non-tender, non-distended, positive bowel sounds  Shirley catheter again continues to put out some blood-tinged urine  Percutaneous nephrostomy tubes with clear yellow urine  Pleural catheter site unremarkable and without any tenderness  He does not have any overt discomfort when I am palpating the abdomen or the back  Extremities: No clubbing, cyanosis or edema   Skin: No new rashes or lesions  No new draining wounds noted  Labs, Imaging, & Other studies:   All pertinent labs and imaging studies were personally reviewed as below    Results from last 7 days   Lab Units 23  1311 23  0515 23  1008   WBC Thousand/uL 12 50* 13 24* 13 74*   HEMOGLOBIN g/dL 8 2* 8 3* 9 1*   PLATELETS Thousands/uL 259 262 263     Results from last 7 days   Lab Units 23  1311 23  0515 01/18/23  0639 01/17/23  0548   POTASSIUM mmol/L 5 3 5 1   < > 5 0   CHLORIDE mmol/L 101 101   < > 102   CO2 mmol/L 26 27   < > 26   BUN mg/dL 53* 49*   < > 50*   CREATININE mg/dL 2 87* 2 75*   < > 3 12*   EGFR ml/min/1 73sq m 19 20   < > 18   CALCIUM mg/dL 9 0 9 0   < > 9 7   AST U/L 39 39  --  39   ALT U/L 43 41  --  47   ALK PHOS U/L 98 102  --  133*    < > = values in this interval not displayed  Results from last 7 days   Lab Units 01/19/23  1202 01/19/23  1023   BLOOD CULTURE  Received in Microbiology Lab  Culture in Progress  No Growth at 24 hrs  Lab interpretation/comments: White count currently 12 5  Creatinine 2 8  Imaging interpretation/comments: CT imaging of the abdomen reviewed and there again remains diffuse wall thickening of the bladder with known malignancy and metastatic disease present along with lymphadenopathy  Culture data: Blood culture so far without growth and urine culture pending    UA grossly abnormal

## 2023-01-20 NOTE — PROGRESS NOTES
[facility]  Progress Note - Shelbi Like 1945, 76 y o  male MRN: [id number]  Unit/Bed#: E5 -01 Encounter: [id number]  Primary Care Provider: Dee Whipple MD   Date and time admitted to hospital: 1/7/2023 10:17 PM    Severe protein-calorie malnutrition [facility])  Assessment & Plan  Malnutrition Findings:   Adult Malnutrition type: Chronic illness  Adult Degree of Malnutrition: Other severe protein calorie malnutrition  Malnutrition Characteristics: Weight loss, Muscle loss, Fat loss                  360 Statement: related to inadequate energy intake as evidenced by 6% weight loss 12/20/22-1/11/23, hollowing of supraclavicular space, wasted interosseous, hollowing orbital  Intervention CCD diet, medical food supplements with meals  BMI Findings: Body mass index is 23 8 kg/m²  Acute on chronic blood loss anemia  Assessment & Plan  · History of hematuria due to bladder cancer  · Hg 7 1 prior to transfer  Received 1 unit PRBC  · Iron panel showing continued deficiency-unable to utilize IV iron given bacteremia  · Seen in consult by hematology oncology    Hemoglobin 8 2 > 8 1 > 8 0 > 7 9 > 8 >7 7>8 8>9 1>8 3    Received 1 unit PRBCs -1/15, hemoglobin stable today, monitor while inpatient    Nephrostomy status ([facility] [address] )  Assessment & Plan  · Chronic bilateral nephrostomy tubes  · Prior to transfer patient reported abdominal pain, CT was done and shows: Stable bilateral percutaneous nephrostomy tubes with interval placement of a right double-J nephroureteral stent  Stable mild right upper pole caliectasis, with resolution of previously seen lower pole caliectasis and hydroureter  Persistent blood   · Appreciate urology recommendations  · Monitor I&O    Elevated brain natriuretic peptide (BNP) level  Assessment & Plan  · BNP >2000   EF 55%, normal wall motion  · CT small stable left pleural effusion with drainage catheter in place  · Continue lasix 20 mg daily with hold parameters  · Monitor daily weight-stable  · Repeat echocardiogram noted    Chronic pleural effusion  Assessment & Plan  · Chronic pleural effusion  CT showing Stable small left pleural effusion with drainage catheter in place  Stable epicardial metastasis  · 1/18-chest CTA obtained for routine surveillance; did show increased fluid on left side  Patient with Pleurx catheter and nursing drained it; only 5 cc extracted  Monitor    Stage 4 chronic kidney disease [facility])  Assessment & Plan  Lab Results   Component Value Date    EGFR 20 01/19/2023    EGFR 20 01/18/2023    EGFR 18 01/17/2023    CREATININE 2 75 (H) 01/19/2023    CREATININE 2 85 (H) 01/18/2023    CREATININE 3 12 (H) 01/17/2023     · Chronic CKD 4;  baseline appears to be around 3 0-3 4  Monitor BMP    Continuous opioid dependence (HCC)  Assessment & Plan  · Maintained on oxycodone 10mg TID prn, verified on PDMP    Gross hematuria  Assessment & Plan  · Patient has persistent hematuria due to bladder cancer  History of recurrent UTI on suppressive Bactrim  · CT A/P: Stable bladder wall thickening, compatible with known malignancy  Increased hyperdensity within the bladder lumen, compatible with new blood clot  · [facility] admission [facility] 12/12-12/22 for hematuria and obstructive uropathy in the setting of known bladder cancer s/p BCG and subsequent chemotherapy/radiation s/p bilateral chronic PCN tubes   Noted to have new right-sided hydronephrosis on CT s/p cystoscopy, clot evacuation w/ right ureteral stenting on 12/14 by Dr Clark [facility]  · UA bloody, innumerable RBC/WBC, +leuks, no nitrites    Patient initially was on CBI per urology  Now transition to hand irrigation several times per day  Urology does not feel patient needs anything other than this right now    He unfortunately likely have chronic hematuria        Malignant neoplasm of anterior wall of urinary bladder (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, recurrence with CIS in 2016 BCG again  BCG refractory disease with pelvic metastases now s/p chemoradiation 2021, on current immunotherapy  · Chronic B/L PCN tubes  · CT A/P Jan 7: Stable bladder wall thickening, compatible with known malignancy  Stable metastatic retroperitoneal lymphadenopathy  Stable epicardial metastasis  · Per oncology note on Jan 6: Chemotherapy treatment should remain on hold for now untill he is clinically stable/discharged; particularly since he was positive flu  To follow-up with primary oncologist after discharge for reevaluation prior to resume treatment  · Port removed 2/2 bacteremia    Type 2 diabetes mellitus, with long-term current use of insulin (HCC)  Assessment & Plan  Lab Results   Component Value Date    HGBA1C 7 2 (H) 10/20/2022     · Currently receiving Lantus 15 units with sliding scale TID  · ADA diet  · Family reports allergy to humalog, so that is not being used    Recent Labs     01/18/23  0728 01/18/23  1124 01/18/23  1611 01/18/23  2125   POCGLU 139 110 200* 228*       Blood Sugar Average: Last 72 hrs:  (P) 179 75    Coronary artery disease of native artery of native heart with stable angina pectoris (HCC)  Assessment & Plan  · Outpatient regimen ASA, Imdur, metoprolol, zetia  · Repeat echocardiogram noted; EF 55% with grade 1 diastolic dysfunction    * Bacteremia due to Enterobacter species  Assessment & Plan  · Patient was transferred from [facility] due to fever and positive blood cultures  · Blood culture growing Enterobacter  · His Port-A-Cath was thought to be the source  · Port-A-Cath removed by interventional radiology  · Repeat blood cultures no growth at 72 hours  · Spoke with ID  They are changing Cefepime to oral levaquin--->> patient initially completed antibiotic therapy as of 1/17    Loop recorder is not in blood stream and is not thought to be infected      Appreciate ID following along; have spoken with patient's family on 1/18; will reinitiate amoxicillin over concerns for elevated white count  May repeat CT abdomen with contrast     Infectious disease requesting CT abdomen with contrast; family reports patient has allergy to contrast, but have not seen this documented in chart  We will investigate further and order CT as appropriate  VTE Pharmacologic Prophylaxis:   Pharmacologic: Pharmacologic VTE Prophylaxis contraindicated due to Hematuria  Mechanical VTE Prophylaxis in Place: Yes    Patient Centered Rounds: I have performed bedside rounds with nursing staff today  Discussions with Specialists or Other Care Team Provider: Infectious disease    Education and Discussions with Family / Patient: Discussed treatment plan with family and patient who agree with current plan; encouraged to ask questions and participate  Time Spent for Care: 1 hour  More than 50% of total time spent on counseling and coordination of care as described above  Current Length of Stay: 13 day(s)    Current Patient Status: Inpatient   Certification Statement: The patient will continue to require additional inpatient hospital stay due to Treatment of hematuria and bladder cancer    Discharge Plan: To be determined    Code Status: Level 1 - Full Code      Subjective:   Patient seen and examined bedside; no acute distress or discomfort noted  Replacement at this time; continue manual bladder irrigation  Hemoglobin dropped; monitor and transfuse as needed  White count stable and likely slightly elevated secondary to underlying malignancy  Had long discussion with infectious disease and spoke with patient's granddaughter along with patient on speaker phone at bedside  Placement has been found, but patient unsatisfied with distance from home to rehab center; will discuss with case management      Objective:     Vitals:   Temp (24hrs), Av 7 °F (37 1 °C), Min:98 1 °F (36 7 °C), Max:99 2 °F (37 3 °C)    Temp:  [98 1 °F (36 7 °C)-99 2 °F (37 3 °C)] 99 2 °F (37 3 °C)  HR:  [] 103  Resp:  [16-18] 16  BP: (130-153)/(73-90) 147/85  SpO2:  [95 %-99 %] 99 %  Body mass index is 23 51 kg/m²  Input and Output Summary (last 24 hours): Intake/Output Summary (Last 24 hours) at 1/20/2023 0905  Last data filed at 1/19/2023 2051  Gross per 24 hour   Intake 10 ml   Output 1550 ml   Net -1540 ml       Physical Exam:     Physical Exam  Vitals and nursing note reviewed  Constitutional:       General: He is not in acute distress  Appearance: He is well-developed  HENT:      Head: Normocephalic and atraumatic  Eyes:      Conjunctiva/sclera: Conjunctivae normal    Cardiovascular:      Rate and Rhythm: Normal rate  Heart sounds: No murmur heard  Pulmonary:      Effort: Pulmonary effort is normal  No respiratory distress  Comments: Left-sided Pleurx catheter in place  Abdominal:      Palpations: Abdomen is soft  Tenderness: There is no abdominal tenderness  Genitourinary:     Comments: Shirley catheter in place; bilateral nephrostomy tubes  Musculoskeletal:         General: No swelling  Cervical back: Neck supple  Right lower leg: Edema present  Left lower leg: Edema present  Skin:     General: Skin is warm and dry  Neurological:      Mental Status: He is alert and oriented to person, place, and time     Psychiatric:         Mood and Affect: Mood normal            Additional Data:     Labs:    Results from last 7 days   Lab Units 01/19/23  0515   WBC Thousand/uL 13 24*   HEMOGLOBIN g/dL 8 3*   HEMATOCRIT % 26 4*   PLATELETS Thousands/uL 262   NEUTROS PCT % 65   LYMPHS PCT % 18   MONOS PCT % 7   EOS PCT % 8*     Results from last 7 days   Lab Units 01/19/23  0515   SODIUM mmol/L 135   POTASSIUM mmol/L 5 1   CHLORIDE mmol/L 101   CO2 mmol/L 27   BUN mg/dL 49*   CREATININE mg/dL 2 75*   ANION GAP mmol/L 7   CALCIUM mg/dL 9 0   ALBUMIN g/dL 3 1*   TOTAL BILIRUBIN mg/dL 0 28   ALK PHOS U/L 102   ALT U/L 41   AST U/L 39   GLUCOSE RANDOM mg/dL 126         Results from last 7 days   Lab Units 01/20/23  0708 01/19/23  2101 01/19/23  1604 01/19/23  1104 01/19/23  0715 01/18/23  2125 01/18/23  1611 01/18/23  1124 01/18/23  0728 01/17/23  2057 01/17/23  1526 01/17/23  1110   POC GLUCOSE mg/dl 148* 420* 241* 163* 123 228* 200* 110 139 332* 207* 152*                   * I Have Reviewed All Lab Data Listed Above  * Additional Pertinent Lab Tests Reviewed: All Labs Within Last 24 Hours Reviewed    Imaging:    Imaging Reports Reviewed Today Include:   Imaging Personally Reviewed by Myself Includes:      Recent Cultures (last 7 days):     Results from last 7 days   Lab Units 01/19/23  1202 01/19/23  1023   BLOOD CULTURE  Received in Microbiology Lab  Culture in Progress  Received in Microbiology Lab  Culture in Progress         Last 24 Hours Medication List:   Current Facility-Administered Medications   Medication Dose Route Frequency Provider Last Rate   • ALPRAZolam  0 25 mg Oral BID PRN Priscella EVANGELINA Magana     • ampicillin  2,000 mg Intravenous Q12H Mykel Cisneros MD 2,000 mg (01/20/23 0155)   • ARIPiprazole  2 mg Oral Daily Jalenella EVANGELINA Magana     • azelastine  1 spray Each Nare BID PRN Priscella EVANGELINA Magana     • bimatoprost  1 drop Both Eyes HS Jalenella EVANGELINA Magana     • calcitriol  0 25 mcg Oral Daily Jalenella EVANGELINA Magana     • cyanocobalamin  1,000 mcg Oral Daily Jalenella EVANGELINA Magana     • diphenhydrAMINE  25 mg Oral Q6H PRN Barbara Beard MD     • ezetimibe  10 mg Oral Daily Priscella EVANGELINA Magana     • furosemide  20 mg Oral Daily Jalenella EVANGELINA Magana     • gabapentin  300 mg Oral HS Jalenella EVANGELINA Magana     • HYDROmorphone  0 2 mg Intravenous 4x Daily PRN EVANGELINA Sears     • insulin glargine  15 Units Subcutaneous HS Ruperto Grossman PA-C     • melatonin  3 mg Oral HS PRN Maddy Ashraf PA-C     • metoprolol tartrate  25 mg Oral Q12H 3600 EVANGELINA Moody     • ondansetron  4 mg Intravenous Q6H PRN Adarsh Mcneal, CRNP     • oxybutynin  5 mg Oral TID EVANGELINA Lu     • oxyCODONE  10 mg Oral TID PRN Adarsh Mcneal CRNP     • pantoprazole  20 mg Oral Early Morning EVANGELINA Roe     • polyethylene glycol  17 g Oral Daily PRN Adarsh Mcneal, CRNP     • senna  2 tablet Oral BID PRN Luca Mcclelland, DO     • simethicone  80 mg Oral 4x Daily PRN EVANGELINA Roe          Today, Patient Was Seen By: Michael Powers MD    ** Please Note: Dictation voice to text software may have been used in the creation of this document   **

## 2023-01-20 NOTE — ASSESSMENT & PLAN NOTE
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, recurrence with CIS in 2016 BCG again  BCG refractory disease with pelvic metastases now s/p chemoradiation 2021, on current immunotherapy  · Chronic B/L PCN tubes  · CT A/P Jan 7: Stable bladder wall thickening, compatible with known malignancy  Stable metastatic retroperitoneal lymphadenopathy  Stable epicardial metastasis  · Per oncology note on Jan 6: Chemotherapy treatment should remain on hold for now untill he is clinically stable/discharged; particularly since he was positive flu  To follow-up with primary oncologist after discharge for reevaluation prior to resume treatment  · Port removed 2/2 bacteremia  · At this time, patient has stage IV metastatic bladder cancer with the following findings on recent CT:    "Progression of pleural-based tumor in the left hemithorax, even when compared to recent abdominopelvic CT examination of just 10 days earlier  Small loculated left pleural collection, increased in size when compared to October 4, 2022 despite the   presence of pleural drainage catheter      Hypodensities in the right hepatic lobe highly suspicious for developing metastatic disease, new from October 2022  increased in size from examination of just 10 days earlier "    Working with patient and family to define expectations and provide optimal treatment  Patient stable from urology point of view for discharge with indwelling Shirley catheter and manual irrigation until followed up in the office  We will attempt to arrange advanced care planning meeting; follows with palliative care in the outpatient setting  No longer septic; labs and vitals largely stable  WDL

## 2023-01-20 NOTE — RESTORATIVE TECHNICIAN NOTE
Restorative Technician Note      Patient Name: Meghan Herrera     Restorative Tech Visit Date: 01/20/23  Note Type: Mobility  Patient Position Upon Consult: Supine  Assistive Device: Roller walker  Patient Position at End of Consult: Bedside chair;  All needs within reach; Bed/Chair alarm activated  ns

## 2023-01-20 NOTE — PLAN OF CARE
Problem: Nutrition/Hydration-ADULT  Goal: Nutrient/Hydration intake appropriate for improving, restoring or maintaining nutritional needs  Description: Monitor and assess patient's nutrition/hydration status for malnutrition  Collaborate with interdisciplinary team and initiate plan and interventions as ordered  Monitor patient's weight and dietary intake as ordered or per policy  Utilize nutrition screening tool and intervene as necessary  Determine patient's food preferences and provide high-protein, high-caloric foods as appropriate       INTERVENTIONS:  - Monitor oral intake, urinary output, labs, and treatment plans  - Assess nutrition and hydration status and recommend course of action  - Evaluate amount of meals eaten  - Assist patient with eating if necessary   - Allow adequate time for meals  - Recommend/ encourage appropriate diets, oral nutritional supplements, and vitamin/mineral supplements  - Order, calculate, and assess calorie counts as needed  - Recommend, monitor, and adjust tube feedings and TPN/PPN based on assessed needs  - Assess need for intravenous fluids  - Provide specific nutrition/hydration education as appropriate  - Include patient/family/caregiver in decisions related to nutrition  Outcome: Progressing     Problem: Prexisting or High Potential for Compromised Skin Integrity  Goal: Skin integrity is maintained or improved  Description: INTERVENTIONS:  - Identify patients at risk for skin breakdown  - Assess and monitor skin integrity  - Assess and monitor nutrition and hydration status  - Monitor labs   - Assess for incontinence   - Turn and reposition patient  - Assist with mobility/ambulation  - Relieve pressure over bony prominences  - Avoid friction and shearing  - Provide appropriate hygiene as needed including keeping skin clean and dry  - Evaluate need for skin moisturizer/barrier cream  - Collaborate with interdisciplinary team   - Patient/family teaching  - Consider wound care consult   Outcome: Progressing     Problem: MOBILITY - ADULT  Goal: Maintain or return to baseline ADL function  Description: INTERVENTIONS:  -  Assess patient's ability to carry out ADLs; assess patient's baseline for ADL function and identify physical deficits which impact ability to perform ADLs (bathing, care of mouth/teeth, toileting, grooming, dressing, etc )  - Assess/evaluate cause of self-care deficits   - Assess range of motion  - Assess patient's mobility; develop plan if impaired  - Assess patient's need for assistive devices and provide as appropriate  - Encourage maximum independence but intervene and supervise when necessary  - Involve family in performance of ADLs  - Assess for home care needs following discharge   - Consider OT consult to assist with ADL evaluation and planning for discharge  - Provide patient education as appropriate  Outcome: Progressing  Goal: Maintains/Returns to pre admission functional level  Description: INTERVENTIONS:  - Perform BMAT or MOVE assessment daily    - Set and communicate daily mobility goal to care team and patient/family/caregiver  - Collaborate with rehabilitation services on mobility goals if consulted  - Perform Range of Motion 3 times a day  - Reposition patient every 2 hours    - Dangle patient 3 times a day  - Stand patient 3 times a day  - Ambulate patient 3 times a day  - Out of bed to chair 3 times a day   - Out of bed for meals 3 times a day  - Out of bed for toileting  - Record patient progress and toleration of activity level   Outcome: Progressing     Problem: Potential for Falls  Goal: Patient will remain free of falls  Description: INTERVENTIONS:  - Educate patient/family on patient safety including physical limitations  - Instruct patient to call for assistance with activity   - Consult OT/PT to assist with strengthening/mobility   - Keep Call bell within reach  - Keep bed low and locked with side rails adjusted as appropriate  - Keep care items and personal belongings within reach  - Initiate and maintain comfort rounds  - Make Fall Risk Sign visible to staff  - Offer Toileting every 2 Hours, in advance of need  - Initiate/Maintain bed alarm  - Obtain necessary fall risk management equipment: Alarm  - Apply yellow socks and bracelet for high fall risk patients  - Consider moving patient to room near nurses station  Outcome: Progressing     Problem: PAIN - ADULT  Goal: Verbalizes/displays adequate comfort level or baseline comfort level  Description: Interventions:  - Encourage patient to monitor pain and request assistance  - Assess pain using appropriate pain scale  - Administer analgesics based on type and severity of pain and evaluate response  - Implement non-pharmacological measures as appropriate and evaluate response  - Consider cultural and social influences on pain and pain management  - Notify physician/advanced practitioner if interventions unsuccessful or patient reports new pain  Outcome: Progressing     Problem: INFECTION - ADULT  Goal: Absence or prevention of progression during hospitalization  Description: INTERVENTIONS:  - Assess and monitor for signs and symptoms of infection  - Monitor lab/diagnostic results  - Monitor all insertion sites, i e  indwelling lines, tubes, and drains  - Monitor endotracheal if appropriate and nasal secretions for changes in amount and color  - [place] appropriate cooling/warming therapies per order  - Administer medications as ordered  - Instruct and encourage patient and family to use good hand hygiene technique  - Identify and instruct in appropriate isolation precautions for identified infection/condition  Outcome: Progressing  Goal: Absence of fever/infection during neutropenic period  Description: INTERVENTIONS:  - Monitor WBC    Outcome: Progressing     Problem: SAFETY ADULT  Goal: Maintain or return to baseline ADL function  Description: INTERVENTIONS:  -  Assess patient's ability to carry out ADLs; assess patient's baseline for ADL function and identify physical deficits which impact ability to perform ADLs (bathing, care of mouth/teeth, toileting, grooming, dressing, etc )  - Assess/evaluate cause of self-care deficits   - Assess range of motion  - Assess patient's mobility; develop plan if impaired  - Assess patient's need for assistive devices and provide as appropriate  - Encourage maximum independence but intervene and supervise when necessary  - Involve family in performance of ADLs  - Assess for home care needs following discharge   - Consider OT consult to assist with ADL evaluation and planning for discharge  - Provide patient education as appropriate  Outcome: Progressing  Goal: Maintains/Returns to pre admission functional level  Description: INTERVENTIONS:  - Perform BMAT or MOVE assessment daily    - Set and communicate daily mobility goal to care team and patient/family/caregiver  - Collaborate with rehabilitation services on mobility goals if consulted  - Perform Range of Motion 3 times a day  - Reposition patient every 2 hours    - Dangle patient 3 times a day  - Stand patient 3 times a day  - Ambulate patient 3 times a day  - Out of bed to chair 3 times a day   - Out of bed for meals 3 times a day  - Out of bed for toileting  - Record patient progress and toleration of activity level   Outcome: Progressing  Goal: Patient will remain free of falls  Description: INTERVENTIONS:  - Educate patient/family on patient safety including physical limitations  - Instruct patient to call for assistance with activity   - Consult OT/PT to assist with strengthening/mobility   - Keep Call bell within reach  - Keep bed low and locked with side rails adjusted as appropriate  - Keep care items and personal belongings within reach  - Initiate and maintain comfort rounds  - Make Fall Risk Sign visible to staff  - Offer Toileting every 2 Hours, in advance of need  - Initiate/Maintain bed alarm  - Obtain necessary fall risk management equipment: Alarm  - Apply yellow socks and bracelet for high fall risk patients  - Consider moving patient to room near nurses station  Outcome: Progressing     Problem: GENITOURINARY - ADULT  Goal: Maintains or returns to baseline urinary function  Description: INTERVENTIONS:  - Assess urinary function  - Encourage oral fluids to ensure adequate hydration if ordered  - Administer IV fluids as ordered to ensure adequate hydration  - Administer ordered medications as needed  - Offer frequent toileting  - Follow urinary retention protocol if ordered  Outcome: Progressing  Goal: Absence of urinary retention  Description: INTERVENTIONS:  - Assess patient’s ability to void and empty bladder  - Monitor I/O  - Bladder scan as needed  - Discuss with physician/AP medications to alleviate retention as needed  - Discuss catheterization for long term situations as appropriate  Outcome: Progressing  Goal: Urinary catheter remains patent  Description: INTERVENTIONS:  - Assess patency of urinary catheter  - If patient has a chronic wagner, consider changing catheter if non-functioning  - Follow guidelines for intermittent irrigation of non-functioning urinary catheter  Outcome: Progressing     Problem: HEMATOLOGIC - ADULT  Goal: Maintains hematologic stability  Description: INTERVENTIONS  - Assess for signs and symptoms of bleeding or hemorrhage  - Monitor labs  - Administer supportive blood products/factors as ordered and appropriate  Outcome: Progressing     Problem: MUSCULOSKELETAL - ADULT  Goal: Maintain or return mobility to safest level of function  Description: INTERVENTIONS:  - Assess patient's ability to carry out ADLs; assess patient's baseline for ADL function and identify physical deficits which impact ability to perform ADLs (bathing, care of mouth/teeth, toileting, grooming, dressing, etc )  - Assess/evaluate cause of self-care deficits   - Assess range of motion  - Assess patient's mobility  - Assess patient's need for assistive devices and provide as appropriate  - Encourage maximum independence but intervene and supervise when necessary  - Involve family in performance of ADLs  - Assess for home care needs following discharge   - Consider OT consult to assist with ADL evaluation and planning for discharge  - Provide patient education as appropriate  Outcome: Progressing  Goal: Maintain proper alignment of affected body part  Description: INTERVENTIONS:  - Support, maintain and protect limb and body alignment  - Provide patient/ family with appropriate education  Outcome: Progressing

## 2023-01-20 NOTE — OCCUPATIONAL THERAPY NOTE
Occupational Therapy Progress Note     Patient Name: Josee Irizarry  Today's Date: 1/20/2023  Problem List  Principal Problem:    Bacteremia due to Enterobacter species  Active Problems:    Coronary artery disease of native artery of native heart with stable angina pectoris (HCC)    Type 2 diabetes mellitus, with long-term current use of insulin (HCC)    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)    Gross hematuria    Continuous opioid dependence (HCC)    Stage 4 chronic kidney disease (HCC)    Chronic pleural effusion    Elevated brain natriuretic peptide (BNP) level    Nephrostomy status (HCC)    Acute on chronic blood loss anemia    Severe protein-calorie malnutrition (HCC)            01/20/23 1135   Note Type   Note Type Treatment   Pain Assessment   Pain Assessment Tool FLACC   Pain Rating: FLACC (Rest) - Face 0   Pain Rating: FLACC (Rest) - Legs 0   Pain Rating: FLACC (Rest) - Activity 0   Pain Rating: FLACC (Rest) - Cry 0   Pain Rating: FLACC (Rest) - Consolability 0   Score: FLACC (Rest) 0   Restrictions/Precautions   Weight Bearing Precautions Per Order No   Other Precautions Contact/isolation; Chair Alarm; Bed Alarm; Fall Risk   ADL   Where Assessed Chair   Eating Assistance 5  Supervision/Setup   Grooming Assistance 5  Supervision/Setup   UB Bathing Assistance 4  Minimal Assistance   LB Bathing Assistance 3  Moderate Assistance   UB Dressing Assistance 4  Minimal Assistance   LB Dressing Assistance 3  Moderate [address]  4  Minimal Assistance   Functional Standing Tolerance   Time 2-3mins   Transfers   Sit to Stand 4  Minimal assistance   Additional items Assist x 1; Increased time required;Verbal cues   Stand to Sit 4  Minimal assistance   Additional items Assist x 1;Verbal cues   Functional Mobility   Functional Mobility 4  Minimal assistance   Additional Comments x1   Additional items Rolling walker   Subjective   Subjective "I need to move more to get my strength better "   Cognition   Overall Cognitive Status Impaired   Arousal/Participation Alert   Attention Attends with cues to redirect   Orientation Level Oriented X4   Memory Decreased short term memory   Following Commands Follows one step commands without difficulty   Activity Tolerance   Activity Tolerance Patient limited by fatigue   Medical Staff Made Aware nsg   Assessment   Assessment Pt seen for 38 min tx with focus functional balance, functional mobility, ADL status, transfer safety, and cognition  Pt able to tolerate OOB mobility; sitting balance=f+/f, standing balance=f-/p+  Pt demonstrating need for assistance with his UE and LE ADLs  Pt required physical assistance to maintain transfer safety; fatigues quickly with functional mobility tasks  Pt able to demonstrate good cognition--i e memory, orientation, direction-following  Therapist reviewed R UE ROM HEP  Pt continues to demonstrate appropriateness for inpt rehab to improve his overall level of independence  Will continue  Plan   Treatment Interventions ADL retraining;Functional transfer training;UE strengthening/ROM; Endurance training;Cognitive reorientation;Patient/family training;Equipment evaluation/education; Compensatory technique education;Continued evaluation   Goal Expiration Date 01/23/23   OT Treatment Day 4   OT Frequency 3-5x/wk   Recommendation   OT Discharge Recommendation Post acute rehabilitation services   AM-PAC Daily Activity Inpatient   Lower Body Dressing 2   Bathing 2   Toileting 2   Upper Body Dressing 3   Grooming 3   Eating 3   Daily Activity Raw Score 15   Daily Activity Standardized Score (Calc for Raw Score >=11) 34 69   AM-PAC Applied Cognition Inpatient   Following a Speech/Presentation 4   Understanding Ordinary Conversation 4   Taking Medications 3   Remembering Where Things Are Placed or Put Away 3   Remembering List of 4-5 Errands 3   Taking Care of Complicated Tasks 3   Applied Cognition Raw Score 20   Applied Cognition Standardized Score 41 76     Antonette Breath

## 2023-01-20 NOTE — PLAN OF CARE
Problem: OCCUPATIONAL THERAPY ADULT  Goal: Performs self-care activities at highest level of function for planned discharge setting  See evaluation for individualized goals  Description: Treatment Interventions: ADL retraining, Functional transfer training, UE strengthening/ROM, Endurance training, Patient/family training, Equipment evaluation/education, Compensatory technique education, Continued evaluation, Activityengagement          See flowsheet documentation for full assessment, interventions and recommendations  Note: Limitation: Decreased ADL status, Decreased UE strength, Decreased Safe judgement during ADL, Decreased endurance, Decreased self-care trans, Decreased high-level ADLs, Decreased fine motor control  Prognosis: Good  Assessment: Pt seen for 38 min tx with focus functional balance, functional mobility, ADL status, transfer safety, and cognition  Pt able to tolerate OOB mobility; sitting balance=f+/f, standing balance=f-/p+  Pt demonstrating need for assistance with his UE and LE ADLs  Pt required physical assistance to maintain transfer safety; fatigues quickly with functional mobility tasks  Pt able to demonstrate good cognition--i e memory, orientation, direction-following  Therapist reviewed R UE ROM HEP  Pt continues to demonstrate appropriateness for inpt rehab to improve his overall level of independence  Will continue       OT Discharge Recommendation: Post acute rehabilitation services

## 2023-01-20 NOTE — ASSESSMENT & PLAN NOTE
· History of hematuria due to bladder cancer  · Hg 7 1 prior to transfer   Received 1 unit PRBC  · Iron panel showing continued deficiency  · Seen in consult by hematology oncology    Hemoglobin 8 2 > 8 1 > 8 0 > 7 9 > 8 >7 7>8 8>9 1>8 3>    Awaiting morning labs today, transfuse as needed

## 2023-01-20 NOTE — ASSESSMENT & PLAN NOTE
· Patient has persistent hematuria due to bladder cancer  History of recurrent UTI on suppressive Bactrim  · CT A/P: Stable bladder wall thickening, compatible with known malignancy  Increased hyperdensity within the bladder lumen, compatible with new blood clot  · Recent admission [facility] 12/12-12/22 for hematuria and obstructive uropathy in the setting of known bladder cancer s/p BCG and subsequent chemotherapy/radiation s/p bilateral chronic PCN tubes   Noted to have new right-sided hydronephrosis on CT s/p cystoscopy, clot evacuation w/ right ureteral stenting on 12/14 by Dr April Varner  · UA bloody, innumerable RBC/WBC, +leuks, no nitrites    Patient initially was on CBI per urology  Now transition to hand irrigation several times per day  Urology does not feel patient needs anything other than this right now    He unfortunately is likely have chronic hematuria

## 2023-01-20 NOTE — CASE MANAGEMENT
Case Management Discharge Planning Note    Patient name Bia Paris  Location [facility] /E5 [address]-* MRN [id number]  : 1945 Date 2023       Current Admission Date: 2023  Current Admission Diagnosis:Bacteremia due to Enterobacter species   Patient Active Problem List    Diagnosis Date Noted   • Severe protein-calorie malnutrition ([facility] ) 2023   • History of pleural effusion 2023   • Nephrostomy status ([facility] ) 2023   • Acute on chronic blood loss anemia 2023   • Bacteremia due to Enterobacter species 2023   • Elevated brain natriuretic peptide (BNP) level 2023   • Ambulatory dysfunction 2023   • Influenza A 2023   • Elevated troponin 2023   • Right sided weakness 2022   • Stroke-like symptoms 2022   • Symptomatic hypotension 2022   • Insomnia 2022   • Right wrist drop 2022   • Chronic pleural effusion 2022   • UTI (urinary tract infection) 2022   • SOB (shortness of breath) 2022   • History of tobacco use disorder 2022   • Retroperitoneal lymphadenopathy 2022   • Pulmonary nodules 2022   • Syncope and collapse 2022   • Depression with suicidal ideation 2022   • Cancer related pain 2022   • Bilateral hydronephrosis 04/10/2022   • Stage 4 chronic kidney disease ([facility] ) 2022   • Fall 2022   • Hyperkalemia 2022   • Retained ureteral stent    • 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[facility] ) 2022   • Embolism and 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 ([facility] ) 2022   • Abnormal CT scan, bladder 2022   • Port-A-Cath in place 2022   • Continuous opioid dependence ([facility] ) 2021   • Hematuria 10/24/2021   • Acute urinary retention 10/21/2021   • Chronic pain syndrome 2021   • Lumbar spondylosis    • [facility] bronchitis ([facility] ) 2021   • Moderate major depression, single episode ([place] [address] ) 2021   • Thrombocytopenia ([facility] ) 03/02/2021   • 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 03/02/2021   • Gross hematuria 02/09/2021   • PAD (peripheral artery disease) ([facility])    • Left carotid bruit    • Anemia of chronic disease 12/19/2020   • Preoperative clearance 12/19/2020   • Symptomatic anemia 10/10/2020   • Diabetic ulcer of left foot associated with type 2 diabetes mellitus, 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 ([facility] ) 10/08/2020   • Acute kidney injury superimposed on CKD ([facility] )    • Dysuria 08/17/2020   • Diabetic polyneuropathy associated with type 2 diabetes mellitus ([facility] ) 07/16/2020   • Abnormal MRI, lumbar spine 06/29/2020   • Malignant neoplasm of anterior wall of urinary bladder ([facility] )    • Secondary renal hyperparathyroidism ([facility] ) 02/12/2020   • Preop examination 04/16/2019   • Superficial phlebitis 03/07/2019   • Arteriosclerosis of artery of extremity ([facility] ) 02/22/2019   • Stable angina pectoris ([facility] ) 02/22/2019   • Herpes zoster without complication 88/70/2466   • Localized edema 02/22/2019   • Back pain 01/31/2019   • Degeneration of lumbar intervertebral disc 12/03/2018   • Primary osteoarthritis of left knee 03/16/2018   • Bladder carcinoma ([facility] ) 08/16/2016   • Presence of stent in coronary artery 08/16/2016   • Hypertension with renal disease 10/29/2015   • Hyperlipidemia 10/29/2015   • Cystitis due to intravesical BCG administration 09/24/2015   • Coronary artery disease of native artery of native heart with stable angina pectoris ([facility] ) 05/19/2011   • Type 2 diabetes mellitus, with long-term current use of insulin ([facility] ) 01/09/2004      LOS (days): 13  Geometric Mean LOS (GMLOS) (days): 5 10  Days to GMLOS:-7 6     OBJECTIVE:  Risk of Unplanned Readmission Score: 68 59         Current admission status: Inpatient   Preferred Pharmacy:   [address] [place] [address]  Phone: [phone] Fax: [facility], [place] - [address] [facility]  [address] [place] [address]  Phone: [phone] Fax: [phone]    Primary Care Provider: Nayeli Fischer MD    Primary Insurance: MEDICARE  Secondary Insurance: BLUE CROSS    DISCHARGE DETAILS:          Additional Comments: CM received a call from mana Doll from [facility] who states she is looking for a bed opening Monday for patient  Marylou [place] states patient would need to participate in therapy to be considered and have therapy notes in by this weekend to be considered for placement on Monday  CM requested therapy to see patient this weekend  CM will hold off on ordering the home equipment Darin Duron requested for now, equipment is to be ordered by rehab facilities  CM will order equipment is patient discharges to home

## 2023-01-20 NOTE — ASSESSMENT & PLAN NOTE
· Patient was transferred from [facility] due to fever and positive blood cultures  · Blood culture growing Enterobacter  · His Port-A-Cath was thought to be the source  · Port-A-Cath removed by interventional radiology  · Repeat blood cultures no growth at 72 hours  · Spoke with ID  They are changing Cefepime to oral levaquin--->> patient initially completed antibiotic therapy as of 1/17    Loop recorder is not in blood stream and is not thought to be infected  Appreciate ID following along; have spoken with patient's family on 1/18; will reinitiate amoxicillin over concerns for elevated white count  May repeat CT abdomen with contrast     Repeat CT abdomen pelvis with no acute findings; redemonstrated bladder cancer with Shirley catheter insertion and stable metastatic disease in liver  Patient had repeat UA done today; has nitrite positive urine with innumerable bacteria; but likely colonized as sample taken from Shirley catheter  Consideration for discontinuing Shirley catheter and obtaining sample not through the catheter was had; but without every 4 hours irrigation, patient at very serious risk of developing blood clots of the bladder and potentially causing more harm than good  Restarted on antibiotics by infectious disease; blood cultures obtained yesterday and pending  Tonight had discussion at bedside with patient and his wife and granddaughter Roman Iglesias, who attended via phone; patient would prefer to go to [facility] pending availability

## 2023-01-21 LAB
ANION GAP SERPL CALCULATED.3IONS-SCNC: 9 MMOL/L (ref 4–13)
BASOPHILS # BLD AUTO: 0.06 THOUSANDS/ÂΜL (ref 0–0.1)
BASOPHILS NFR BLD AUTO: 0 % (ref 0–1)
BUN SERPL-MCNC: 51 MG/DL (ref 5–25)
CALCIUM SERPL-MCNC: 8.9 MG/DL (ref 8.4–10.2)
CHLORIDE SERPL-SCNC: 104 MMOL/L (ref 96–108)
CO2 SERPL-SCNC: 22 MMOL/L (ref 21–32)
CREAT SERPL-MCNC: 2.79 MG/DL (ref 0.6–1.3)
EOSINOPHIL # BLD AUTO: 1.06 THOUSAND/ÂΜL (ref 0–0.61)
EOSINOPHIL NFR BLD AUTO: 8 % (ref 0–6)
ERYTHROCYTE [DISTWIDTH] IN BLOOD BY AUTOMATED COUNT: 16.8 % (ref 11.6–15.1)
GFR SERPL CREATININE-BSD FRML MDRD: 20 ML/MIN/1.73SQ M
GLUCOSE SERPL-MCNC: 122 MG/DL (ref 65–140)
GLUCOSE SERPL-MCNC: 124 MG/DL (ref 65–140)
GLUCOSE SERPL-MCNC: 135 MG/DL (ref 65–140)
GLUCOSE SERPL-MCNC: 137 MG/DL (ref 65–140)
GLUCOSE SERPL-MCNC: 235 MG/DL (ref 65–140)
HCT VFR BLD AUTO: 24 % (ref 36.5–49.3)
HGB BLD-MCNC: 7.7 G/DL (ref 12–17)
IMM GRANULOCYTES # BLD AUTO: 0.09 THOUSAND/UL (ref 0–0.2)
IMM GRANULOCYTES NFR BLD AUTO: 1 % (ref 0–2)
LYMPHOCYTES # BLD AUTO: 2.43 THOUSANDS/ÂΜL (ref 0.6–4.47)
LYMPHOCYTES NFR BLD AUTO: 18 % (ref 14–44)
MCH RBC QN AUTO: 29.6 PG (ref 26.8–34.3)
MCHC RBC AUTO-ENTMCNC: 32.1 G/DL (ref 31.4–37.4)
MCV RBC AUTO: 92 FL (ref 82–98)
MONOCYTES # BLD AUTO: 1.15 THOUSAND/ÂΜL (ref 0.17–1.22)
MONOCYTES NFR BLD AUTO: 9 % (ref 4–12)
NEUTROPHILS # BLD AUTO: 8.65 THOUSANDS/ÂΜL (ref 1.85–7.62)
NEUTS SEG NFR BLD AUTO: 64 % (ref 43–75)
NRBC BLD AUTO-RTO: 0 /100 WBCS
PLATELET # BLD AUTO: 242 THOUSANDS/UL (ref 149–390)
PMV BLD AUTO: 9.1 FL (ref 8.9–12.7)
POTASSIUM SERPL-SCNC: 4.8 MMOL/L (ref 3.5–5.3)
RBC # BLD AUTO: 2.6 MILLION/UL (ref 3.88–5.62)
SODIUM SERPL-SCNC: 135 MMOL/L (ref 135–147)
WBC # BLD AUTO: 13.44 THOUSAND/UL (ref 4.31–10.16)

## 2023-01-21 RX ORDER — INSULIN GLARGINE 100 [IU]/ML
18 INJECTION, SOLUTION SUBCUTANEOUS
Status: DISCONTINUED | OUTPATIENT
Start: 2023-01-21 | End: 2023-01-24 | Stop reason: HOSPADM

## 2023-01-21 RX ORDER — HYDROXYZINE HYDROCHLORIDE 25 MG/1
25 TABLET, FILM COATED ORAL EVERY 6 HOURS PRN
Status: DISCONTINUED | OUTPATIENT
Start: 2023-01-21 | End: 2023-01-24 | Stop reason: HOSPADM

## 2023-01-21 RX ORDER — OXYCODONE HYDROCHLORIDE 10 MG/1
10 TABLET ORAL EVERY 4 HOURS PRN
Status: DISCONTINUED | OUTPATIENT
Start: 2023-01-21 | End: 2023-01-24 | Stop reason: HOSPADM

## 2023-01-21 RX ADMIN — FUROSEMIDE 20 MG: 20 TABLET ORAL at 09:24

## 2023-01-21 RX ADMIN — METOPROLOL TARTRATE 25 MG: 25 TABLET, FILM COATED ORAL at 09:24

## 2023-01-21 RX ADMIN — HYDROMORPHONE HYDROCHLORIDE 0.2 MG: 0.2 INJECTION, SOLUTION INTRAMUSCULAR; INTRAVENOUS; SUBCUTANEOUS at 21:37

## 2023-01-21 RX ADMIN — INSULIN GLARGINE 18 UNITS: 100 INJECTION, SOLUTION SUBCUTANEOUS at 21:37

## 2023-01-21 RX ADMIN — CALCITRIOL 0.25 MCG: 0.25 CAPSULE, LIQUID FILLED ORAL at 09:24

## 2023-01-21 RX ADMIN — OXYCODONE HYDROCHLORIDE 10 MG: 10 TABLET ORAL at 18:28

## 2023-01-21 RX ADMIN — ARIPIPRAZOLE 2 MG: 2 TABLET ORAL at 09:24

## 2023-01-21 RX ADMIN — HYDROXYZINE HYDROCHLORIDE 25 MG: 25 TABLET ORAL at 18:28

## 2023-01-21 RX ADMIN — OXYBUTYNIN CHLORIDE 5 MG: 5 TABLET ORAL at 15:16

## 2023-01-21 RX ADMIN — OXYCODONE HYDROCHLORIDE 10 MG: 10 TABLET ORAL at 02:57

## 2023-01-21 RX ADMIN — PANTOPRAZOLE SODIUM 20 MG: 20 TABLET, DELAYED RELEASE ORAL at 06:25

## 2023-01-21 RX ADMIN — AMPICILLIN SODIUM 2000 MG: 2 INJECTION, POWDER, FOR SOLUTION INTRAMUSCULAR; INTRAVENOUS at 15:10

## 2023-01-21 RX ADMIN — EZETIMIBE 10 MG: 10 TABLET ORAL at 09:24

## 2023-01-21 RX ADMIN — OXYCODONE HYDROCHLORIDE 10 MG: 10 TABLET ORAL at 11:20

## 2023-01-21 RX ADMIN — DIPHENHYDRAMINE HCL 25 MG: 25 TABLET, COATED ORAL at 11:23

## 2023-01-21 RX ADMIN — BIMATOPROST 1 DROP: 0.1 SOLUTION/ DROPS OPHTHALMIC at 21:37

## 2023-01-21 RX ADMIN — GABAPENTIN 300 MG: 300 CAPSULE ORAL at 21:37

## 2023-01-21 RX ADMIN — AMPICILLIN SODIUM 2000 MG: 2 INJECTION, POWDER, FOR SOLUTION INTRAMUSCULAR; INTRAVENOUS at 02:54

## 2023-01-21 RX ADMIN — OXYBUTYNIN CHLORIDE 5 MG: 5 TABLET ORAL at 21:37

## 2023-01-21 RX ADMIN — METOPROLOL TARTRATE 25 MG: 25 TABLET, FILM COATED ORAL at 21:40

## 2023-01-21 RX ADMIN — OXYBUTYNIN CHLORIDE 5 MG: 5 TABLET ORAL at 09:24

## 2023-01-21 RX ADMIN — CYANOCOBALAMIN TAB 500 MCG 1000 MCG: 500 TAB at 09:24

## 2023-01-21 NOTE — ASSESSMENT & PLAN NOTE
Malnutrition Findings:   Adult Malnutrition type: Chronic illness  Adult Degree of Malnutrition: Other severe protein calorie malnutrition  Malnutrition Characteristics: Weight loss, Muscle loss, Fat loss                  360 Statement: related to inadequate energy intake as evidenced by 6% weight loss 12/20/22-1/11/23, hollowing of supraclavicular space, wasted interosseous, hollowing orbital  Intervention CCD diet, medical food supplements with meals  BMI Findings: Body mass index is 23 45 kg/m²

## 2023-01-21 NOTE — ASSESSMENT & PLAN NOTE
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, recurrence with CIS in 2016 BCG again  BCG refractory disease with pelvic metastases now s/p chemoradiation 2021, on current immunotherapy  · Chronic B/L PCN tubes  · CT A/P Jan 7: Stable bladder wall thickening, compatible with known malignancy  Stable metastatic retroperitoneal lymphadenopathy  Stable epicardial metastasis  · Per oncology note on Jan 6: Chemotherapy treatment should remain on hold for now untill he is clinically stable/discharged; particularly since he was positive flu  To follow-up with primary oncologist after discharge for reevaluation prior to resume treatment  · Port removed 2/2 bacteremia  · At this time, patient has stage IV metastatic bladder cancer with the following findings on recent CT:    "Progression of pleural-based tumor in the left hemithorax, even when compared to recent abdominopelvic CT examination of just 10 days earlier  Small loculated left pleural collection, increased in size when compared to October 4, 2022 despite the   presence of pleural drainage catheter      Hypodensities in the right hepatic lobe highly suspicious for developing metastatic disease, new from October 2022  increased in size from examination of just 10 days earlier "    Working with patient and family to define expectations and provide optimal treatment  Patient stable from urology point of view for discharge with indwelling Shirley catheter and manual irrigation until followed up in the office  We will attempt to arrange advanced care planning meeting; follows with palliative care in the outpatient setting  No longer septic; labs and vitals largely stable

## 2023-01-21 NOTE — PROGRESS NOTES
[facility]  Progress Note - Demian Davis 1945, 76 y o  male MRN: [id number]  Unit/Bed#: E5 -01 Encounter: [id number]  Primary Care Provider: Estuardo Rowe MD   Date and time admitted to hospital: 1/7/2023 10:17 PM    Severe protein-calorie malnutrition [facility])  Assessment & Plan  Malnutrition Findings:   Adult Malnutrition type: Chronic illness  Adult Degree of Malnutrition: Other severe protein calorie malnutrition  Malnutrition Characteristics: Weight loss, Muscle loss, Fat loss                  360 Statement: related to inadequate energy intake as evidenced by 6% weight loss 12/20/22-1/11/23, hollowing of supraclavicular space, wasted interosseous, hollowing orbital  Intervention CCD diet, medical food supplements with meals  BMI Findings: Body mass index is 23 45 kg/m²  Acute on chronic blood loss anemia  Assessment & Plan  · History of hematuria due to bladder cancer  · Hg 7 1 prior to transfer  Received 1 unit PRBC  · Iron panel showing continued deficiency  · Seen in consult by hematology oncology    Hemoglobin 8 2 > 8 1 > 8 0 > 7 9 > 8 >7 7>8 8>9 1>8 3>7 7    Hemoglobin slowly dropping in the setting of hematuria; consider transfusion prior to discharge  Monitor on morning labs    Nephrostomy status ([facility] [address] )  Assessment & Plan  · Chronic bilateral nephrostomy tubes  · Prior to transfer patient reported abdominal pain, CT was done and shows: Stable bilateral percutaneous nephrostomy tubes with interval placement of a right double-J nephroureteral stent  Stable mild right upper pole caliectasis, with resolution of previously seen lower pole caliectasis and hydroureter  Persistent blood   · Appreciate urology recommendations  · Monitor I&O    Elevated brain natriuretic peptide (BNP) level  Assessment & Plan  · BNP >2000   EF 55%, normal wall motion  · CT small stable left pleural effusion with drainage catheter in place  · Continue lasix 20 mg daily with hold parameters  · Monitor daily weight-stable  · Repeat echocardiogram noted    Chronic pleural effusion  Assessment & Plan  · Chronic pleural effusion  CT showing Stable small left pleural effusion with drainage catheter in place  Stable epicardial metastasis  · 1/18-chest CTA obtained for routine surveillance; did show increased fluid on left side  Patient with Pleurx catheter and nursing drained it; only 5 cc extracted  Monitor    Stage 4 chronic kidney disease [facility])  Assessment & Plan  Lab Results   Component Value Date    EGFR 20 01/21/2023    EGFR 19 01/20/2023    EGFR 20 01/19/2023    CREATININE 2 79 (H) 01/21/2023    CREATININE 2 87 (H) 01/20/2023    CREATININE 2 75 (H) 01/19/2023     · Chronic CKD 4;  baseline appears to be around 3 0-3 4  Monitor BMP    Continuous opioid dependence (HCC)  Assessment & Plan  · Maintained on oxycodone 10mg TID prn, verified on PDMP    Gross hematuria  Assessment & Plan  · Patient has persistent hematuria due to bladder cancer  History of recurrent UTI on suppressive Bactrim  · CT A/P: Stable bladder wall thickening, compatible with known malignancy  Increased hyperdensity within the bladder lumen, compatible with new blood clot  · Recent admission at [facility] 12/12-12/22 for hematuria and obstructive uropathy in the setting of known bladder cancer s/p BCG and subsequent chemotherapy/radiation s/p bilateral chronic PCN tubes   Noted to have new right-sided hydronephrosis on CT s/p cystoscopy, clot evacuation w/ right ureteral stenting on 12/14 by Dr April Varner  · UA bloody, innumerable RBC/WBC, +leuks, no nitrites    Patient initially was on CBI per urology  Now transition to hand irrigation several times per day  Urology does not feel patient needs anything other than this right now    He unfortunately is likely to have chronic hematuria        Malignant neoplasm of anterior wall of urinary bladder ([facility] [address] )  Assessment & Plan  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, recurrence with CIS in 2016 BCG again  BCG refractory disease with pelvic metastases now s/p chemoradiation 2021, on current immunotherapy  · Chronic B/L PCN tubes  · CT A/P Jan 7: Stable bladder wall thickening, compatible with known malignancy  Stable metastatic retroperitoneal lymphadenopathy  Stable epicardial metastasis  · Per oncology note on Jan 6: Chemotherapy treatment should remain on hold for now untill he is clinically stable/discharged; particularly since he was positive flu  To follow-up with primary oncologist after discharge for reevaluation prior to resume treatment  · Port removed 2/2 bacteremia  · At this time, patient has stage IV metastatic bladder cancer with the following findings on recent CT:    "Progression of pleural-based tumor in the left hemithorax, even when compared to recent abdominopelvic CT examination of just 10 days earlier  Small loculated left pleural collection, increased in size when compared to October 4, 2022 despite the   presence of pleural drainage catheter      Hypodensities in the right hepatic lobe highly suspicious for developing metastatic disease, new from October 2022  increased in size from examination of just 10 days earlier "    Working with patient and family to define expectations and provide optimal treatment  Patient stable from urology point of view for discharge with indwelling Shirley catheter and manual irrigation until followed up in the office  We will attempt to arrange advanced care planning meeting; follows with palliative care in the outpatient setting  No longer septic; labs and vitals largely stable      Type 2 diabetes mellitus, with long-term current use of insulin (HCC)  Assessment & Plan  Lab Results   Component Value Date    HGBA1C 7 2 (H) 10/20/2022     · Patient with poor evening glucose readings; will increase Lantus to 18 units daily  · ADA diet  · Family reports allergy to humalog, so that is not being used    Recent Labs     01/20/23  0708 01/20/23  1057 01/20/23 2037 01/21/23  0749   POCGLU 148* 235* 321* 124       Blood Sugar Average: Last 72 hrs:  (P) 109 [id number]    Coronary artery disease of native artery of native heart with stable angina pectoris (HCC)  Assessment & Plan  · Outpatient regimen ASA, Imdur, metoprolol, zetia  · Repeat echocardiogram noted; EF 55% with grade 1 diastolic dysfunction    * Bacteremia due to Enterobacter species  Assessment & Plan  · Patient was transferred from [address] [place] [address] due to fever and positive blood cultures  · Blood culture growing Enterobacter  · His Port-A-Cath was thought to be the source  · Port-A-Cath removed by interventional radiology  · Repeat blood cultures no growth at 72 hours  · Spoke with ID  They are changing Cefepime to oral levaquin--->> patient initially completed antibiotic therapy as of 1/17    Loop recorder is not in blood stream and is not thought to be infected  Appreciate ID following along; have spoken with patient's family on 1/18; will reinitiate amoxicillin over concerns for elevated white count  May repeat CT abdomen with contrast     Repeat CT abdomen pelvis with no acute findings; redemonstrated bladder cancer with Shirley catheter insertion and stable metastatic disease in liver  Patient had repeat UA done yesterday; has nitrite positive urine with innumerable bacteria; but likely colonized as sample taken from Shirley catheter  Consideration for discontinuing Shirley catheter and obtaining sample not through the catheter was had; but without every 4 hours irrigation, patient at very serious risk of developing blood clots of the bladder and potentially causing more harm than good  Restarted on antibiotics by infectious disease; blood cultures obtained on 1/19-negative at 24 hours X2  Additionally, on 1/21, despite being on antibiotics, white count trending up; patient remains afebrile and stable          VTE Pharmacologic Prophylaxis:   Pharmacologic: Pharmacologic VTE Prophylaxis contraindicated due to Hematuria  Mechanical VTE Prophylaxis in Place: Yes    Patient Centered Rounds: I have performed bedside rounds with nursing staff today  Discussions with Specialists or Other Care Team Provider:     Education and Discussions with Family / Patient: Discussed treatment plan with family and patient who agree with current plan; encouraged to ask questions and participate  Time Spent for Care: 45 minutes  More than 50% of total time spent on counseling and coordination of care as described above  Current Length of Stay: 14 day(s)    Current Patient Status: Inpatient   Certification Statement: The patient will continue to require additional inpatient hospital stay due to Awaiting placement    Discharge Plan: To be determined    Code Status: Level 1 - Full Code      Subjective:   Patient seen and examined bedside, no acute distress but did report some increased back pain  Have increased patient's pain medications to every 4 hours as needed  In addition, patient scratching all over but particularly his right forearm  Have started Vistaril and will monitor for improvement in symptoms of pruritus  Conducted ACP discussion today and annotated on appropriate ACP note  Patient did express a desire to have more input into his healthcare decisions  Reported that he was somewhat disappointed that he did not have the cystectomy that was recommended by Max Dewitt years ago  Would be interested in family meeting moving forward as he states "that half of the family" is unaware of the severity of my illness and they should know  Patient with good insight into his illness that has had since roughly 1994  We will work on establishing family meeting for his entire family moving forward  Hemoglobin 7 7 today; patient consents to blood transfusions  If it continues to go down tomorrow; will transfuse tomorrow morning    White count trending up despite antibiotic therapy; once again, most likely from underlying metastatic disease, but will continue current treatment plan at this time  Patient persistently tachycardic; no other SIRS criteria and is afebrile and nontoxic-appearing  Objective:     Vitals:   Temp (24hrs), Av 9 °F (37 2 °C), Min:97 8 °F (36 6 °C), Max:99 6 °F (37 6 °C)    Temp:  [97 8 °F (36 6 °C)-99 6 °F (37 6 °C)] 99 6 °F (37 6 °C)  HR:  [] 95  Resp:  [16-18] 18  BP: (113-127)/(63-74) 116/63  SpO2:  [97 %-99 %] 97 %  Body mass index is 23 45 kg/m²  Input and Output Summary (last 24 hours): Intake/Output Summary (Last 24 hours) at 2023 1808  Last data filed at 2023 1320  Gross per 24 hour   Intake --   Output 1575 ml   Net -1575 ml       Physical Exam:     Physical Exam  Vitals and nursing note reviewed  Constitutional:       General: He is not in acute distress  Appearance: He is well-developed  HENT:      Head: Normocephalic and atraumatic  Eyes:      Conjunctiva/sclera: Conjunctivae normal    Cardiovascular:      Rate and Rhythm: Normal rate  Heart sounds: No murmur heard  Pulmonary:      Effort: Pulmonary effort is normal  No respiratory distress  Comments: Left-sided Pleurx catheter in place  Abdominal:      Palpations: Abdomen is soft  Tenderness: There is no abdominal tenderness  Genitourinary:     Comments: Shirley catheter in place; bilateral nephrostomy tubes  Musculoskeletal:         General: No swelling  Cervical back: Neck supple  Right lower leg: Edema present  Left lower leg: Edema present  Skin:     General: Skin is warm and dry  Neurological:      Mental Status: He is alert and oriented to person, place, and time     Psychiatric:         Mood and Affect: Mood normal            Additional Data:     Labs:    Results from last 7 days   Lab Units 23  0630   WBC Thousand/uL 13 44*   HEMOGLOBIN g/dL 7 7*   HEMATOCRIT % 24 0*   PLATELETS Thousands/uL 242   NEUTROS PCT % 64   LYMPHS PCT % 18   MONOS PCT % 9   EOS PCT % 8*     Results from last 7 days   Lab Units 01/21/23  0630 01/20/23  1311   SODIUM mmol/L 135 134*   POTASSIUM mmol/L 4 8 5 3   CHLORIDE mmol/L 104 101   CO2 mmol/L 22 26   BUN mg/dL 51* 53*   CREATININE mg/dL 2 79* 2 87*   ANION GAP mmol/L 9 7   CALCIUM mg/dL 8 9 9 0   ALBUMIN g/dL  --  3 1*   TOTAL BILIRUBIN mg/dL  --  0 21   ALK PHOS U/L  --  98   ALT U/L  --  43   AST U/L  --  39   GLUCOSE RANDOM mg/dL 122 166*         Results from last 7 days   Lab Units 01/21/23  1655 01/21/23  1123 01/21/23  0749 01/20/23  2037 01/20/23  1057 01/20/23  0708 01/19/23  2101 01/19/23  1604 01/19/23  1104 01/19/23  0715 01/18/23  2125 01/18/23  1611   POC GLUCOSE mg/dl 135 137 124 321* 235* 148* 420* 241* 163* 123 228* 200*                   * I Have Reviewed All Lab Data Listed Above  * Additional Pertinent Lab Tests Reviewed: All Labs Within Last 24 Hours Reviewed    Imaging:    Imaging Reports Reviewed Today Include:   Imaging Personally Reviewed by Myself Includes:      Recent Cultures (last 7 days):     Results from last 7 days   Lab Units 01/20/23  1133 01/19/23  1202 01/19/23  1023   BLOOD CULTURE   --  No Growth at 48 hrs  No Growth at 48 hrs  URINE CULTURE  Culture results to follow    --   --        Last 24 Hours Medication List:   Current Facility-Administered Medications   Medication Dose Route Frequency Provider Last Rate   • ALPRAZolam  0 25 mg Oral BID PRN EVANGELINA Perez     • ampicillin  2,000 mg Intravenous Q12H Nancy Coleman MD 2,000 mg (01/21/23 1510)   • ARIPiprazole  2 mg Oral Daily EVANGELINA Perez     • azelastine  1 spray Each Nare BID PRN EVANGELINA Perez     • bimatoprost  1 drop Both Eyes HS EVANGELINA Perez     • calcitriol  0 25 mcg Oral Daily Josselin Bears, CRNP     • cyanocobalamin  1,000 mcg Oral Daily EVANGELINA Perez     • ezetimibe  10 mg Oral Daily Valerie Christianson EVANGELINA Bagley     • furosemide  20 mg Oral Daily EVANGELINA Martin     • gabapentin  300 mg Oral HS EVANGELINA Martin     • HYDROmorphone  0 2 mg Intravenous 4x Daily PRN EVANGELINA Martin     • hydrOXYzine HCL  25 mg Oral Q6H PRN Lawrence Lu MD     • insulin glargine  18 Units Subcutaneous HS Lawrence Lu MD     • melatonin  3 mg Oral HS PRN Willow Orozco PA-C     • metoprolol tartrate  25 mg Oral Q12H [facility], EVANGELINA     • ondansetron  4 mg Intravenous Q6H PRN EVANGELINA Martin     • oxybutynin  5 mg Oral TID EVANGELINA Purdy     • oxyCODONE  10 mg Oral Q4H PRN Lawrence Lu MD     • pantoprazole  20 mg Oral Early Morning EVANGELINA Martin     • polyethylene glycol  17 g Oral Daily PRN EVANGELINA Martin     • senna  2 tablet Oral BID PRN Deyanira Mcclelland DO     • simethicone  80 mg Oral 4x Daily PRN EVANGELINA Martin          Today, Patient Was Seen By: Lazara Barker MD    ** Please Note: Dictation voice to text software may have been used in the creation of this document   **

## 2023-01-21 NOTE — ASSESSMENT & PLAN NOTE
Lab Results   Component Value Date    EGFR 20 01/21/2023    EGFR 19 01/20/2023    EGFR 20 01/19/2023    CREATININE 2 79 (H) 01/21/2023    CREATININE 2 87 (H) 01/20/2023    CREATININE 2 75 (H) 01/19/2023     · Chronic CKD 4;  baseline appears to be around 3 0-3 4    Monitor BMP

## 2023-01-21 NOTE — PLAN OF CARE
Problem: Nutrition/Hydration-ADULT  Goal: Nutrient/Hydration intake appropriate for improving, restoring or maintaining nutritional needs  Description: Monitor and assess patient's nutrition/hydration status for malnutrition  Collaborate with interdisciplinary team and initiate plan and interventions as ordered  Monitor patient's weight and dietary intake as ordered or per policy  Utilize nutrition screening tool and intervene as necessary  Determine patient's food preferences and provide high-protein, high-caloric foods as appropriate       INTERVENTIONS:  - Monitor oral intake, urinary output, labs, and treatment plans  - Assess nutrition and hydration status and recommend course of action  - Evaluate amount of meals eaten  - Assist patient with eating if necessary   - Allow adequate time for meals  - Recommend/ encourage appropriate diets, oral nutritional supplements, and vitamin/mineral supplements  - Order, calculate, and assess calorie counts as needed  - Recommend, monitor, and adjust tube feedings and TPN/PPN based on assessed needs  - Assess need for intravenous fluids  - Provide specific nutrition/hydration education as appropriate  - Include patient/family/caregiver in decisions related to nutrition  Outcome: Progressing     Problem: Prexisting or High Potential for Compromised Skin Integrity  Goal: Skin integrity is maintained or improved  Description: INTERVENTIONS:  - Identify patients at risk for skin breakdown  - Assess and monitor skin integrity  - Assess and monitor nutrition and hydration status  - Monitor labs   - Assess for incontinence   - Turn and reposition patient  - Assist with mobility/ambulation  - Relieve pressure over bony prominences  - Avoid friction and shearing  - Provide appropriate hygiene as needed including keeping skin clean and dry  - Evaluate need for skin moisturizer/barrier cream  - Collaborate with interdisciplinary team   - Patient/family teaching  - Consider wound care consult   Outcome: Progressing     Problem: MOBILITY - ADULT  Goal: Maintain or return to baseline ADL function  Description: INTERVENTIONS:  -  Assess patient's ability to carry out ADLs; assess patient's baseline for ADL function and identify physical deficits which impact ability to perform ADLs (bathing, care of mouth/teeth, toileting, grooming, dressing, etc )  - Assess/evaluate cause of self-care deficits   - Assess range of motion  - Assess patient's mobility; develop plan if impaired  - Assess patient's need for assistive devices and provide as appropriate  - Encourage maximum independence but intervene and supervise when necessary  - Involve family in performance of ADLs  - Assess for home care needs following discharge   - Consider OT consult to assist with ADL evaluation and planning for discharge  - Provide patient education as appropriate  Outcome: Progressing  Goal: Maintains/Returns to pre admission functional level  Description: INTERVENTIONS:  - Perform BMAT or MOVE assessment daily    - Set and communicate daily mobility goal to care team and patient/family/caregiver  - Collaborate with rehabilitation services on mobility goals if consulted  - Perform Range of Motion 3 times a day  - Reposition patient every 2 hours    - Dangle patient 3 times a day  - Stand patient 3 times a day  - Ambulate patient 3 times a day  - Out of bed to chair 3 times a day   - Out of bed for meals 3 times a day  - Out of bed for toileting  - Record patient progress and toleration of activity level   Outcome: Progressing     Problem: Potential for Falls  Goal: Patient will remain free of falls  Description: INTERVENTIONS:  - Educate patient/family on patient safety including physical limitations  - Instruct patient to call for assistance with activity   - Consult OT/PT to assist with strengthening/mobility   - Keep Call bell within reach  - Keep bed low and locked with side rails adjusted as appropriate  - Keep care items and personal belongings within reach  - Initiate and maintain comfort rounds  - Make Fall Risk Sign visible to staff  - Offer Toileting every 2 Hours, in advance of need  - Initiate/Maintain bed alarm  - Obtain necessary fall risk management equipment: Alarm  - Apply yellow socks and bracelet for high fall risk patients  - Consider moving patient to room near nurses station  Outcome: Progressing     Problem: PAIN - ADULT  Goal: Verbalizes/displays adequate comfort level or baseline comfort level  Description: Interventions:  - Encourage patient to monitor pain and request assistance  - Assess pain using appropriate pain scale  - Administer analgesics based on type and severity of pain and evaluate response  - Implement non-pharmacological measures as appropriate and evaluate response  - Consider cultural and social influences on pain and pain management  - Notify physician/advanced practitioner if interventions unsuccessful or patient reports new pain  Outcome: Progressing     Problem: INFECTION - ADULT  Goal: Absence or prevention of progression during hospitalization  Description: INTERVENTIONS:  - Assess and monitor for signs and symptoms of infection  - Monitor lab/diagnostic results  - Monitor all insertion sites, i e  indwelling lines, tubes, and drains  - Monitor endotracheal if appropriate and nasal secretions for changes in amount and color  - Jewett appropriate cooling/warming therapies per order  - Administer medications as ordered  - Instruct and encourage patient and family to use good hand hygiene technique  - Identify and instruct in appropriate isolation precautions for identified infection/condition  Outcome: Progressing  Goal: Absence of fever/infection during neutropenic period  Description: INTERVENTIONS:  - Monitor WBC    Outcome: Progressing     Problem: SAFETY ADULT  Goal: Maintain or return to baseline ADL function  Description: INTERVENTIONS:  -  Assess patient's ability to carry out ADLs; assess patient's baseline for ADL function and identify physical deficits which impact ability to perform ADLs (bathing, care of mouth/teeth, toileting, grooming, dressing, etc )  - Assess/evaluate cause of self-care deficits   - Assess range of motion  - Assess patient's mobility; develop plan if impaired  - Assess patient's need for assistive devices and provide as appropriate  - Encourage maximum independence but intervene and supervise when necessary  - Involve family in performance of ADLs  - Assess for home care needs following discharge   - Consider OT consult to assist with ADL evaluation and planning for discharge  - Provide patient education as appropriate  Outcome: Progressing  Goal: Maintains/Returns to pre admission functional level  Description: INTERVENTIONS:  - Perform BMAT or MOVE assessment daily    - Set and communicate daily mobility goal to care team and patient/family/caregiver  - Collaborate with rehabilitation services on mobility goals if consulted  - Perform Range of Motion 3 times a day  - Reposition patient every 2 hours    - Dangle patient 3 times a day  - Stand patient 3 times a day  - Ambulate patient 3 times a day  - Out of bed to chair 3 times a day   - Out of bed for meals 3 times a day  - Out of bed for toileting  - Record patient progress and toleration of activity level   Outcome: Progressing  Goal: Patient will remain free of falls  Description: INTERVENTIONS:  - Educate patient/family on patient safety including physical limitations  - Instruct patient to call for assistance with activity   - Consult OT/PT to assist with strengthening/mobility   - Keep Call bell within reach  - Keep bed low and locked with side rails adjusted as appropriate  - Keep care items and personal belongings within reach  - Initiate and maintain comfort rounds  - Make Fall Risk Sign visible to staff  - Offer Toileting every 2 Hours, in advance of need  - Initiate/Maintain bed alarm  - Obtain necessary fall risk management equipment: Alarm  - Apply yellow socks and bracelet for high fall risk patients  - Consider moving patient to room near nurses station  Outcome: Progressing     Problem: GENITOURINARY - ADULT  Goal: Maintains or returns to baseline urinary function  Description: INTERVENTIONS:  - Assess urinary function  - Encourage oral fluids to ensure adequate hydration if ordered  - Administer IV fluids as ordered to ensure adequate hydration  - Administer ordered medications as needed  - Offer frequent toileting  - Follow urinary retention protocol if ordered  Outcome: Progressing  Goal: Absence of urinary retention  Description: INTERVENTIONS:  - Assess patient’s ability to void and empty bladder  - Monitor I/O  - Bladder scan as needed  - Discuss with physician/AP medications to alleviate retention as needed  - Discuss catheterization for long term situations as appropriate  Outcome: Progressing  Goal: Urinary catheter remains patent  Description: INTERVENTIONS:  - Assess patency of urinary catheter  - If patient has a chronic wagner, consider changing catheter if non-functioning  - Follow guidelines for intermittent irrigation of non-functioning urinary catheter  Outcome: Progressing     Problem: HEMATOLOGIC - ADULT  Goal: Maintains hematologic stability  Description: INTERVENTIONS  - Assess for signs and symptoms of bleeding or hemorrhage  - Monitor labs  - Administer supportive blood products/factors as ordered and appropriate  Outcome: Progressing     Problem: MUSCULOSKELETAL - ADULT  Goal: Maintain or return mobility to safest level of function  Description: INTERVENTIONS:  - Assess patient's ability to carry out ADLs; assess patient's baseline for ADL function and identify physical deficits which impact ability to perform ADLs (bathing, care of mouth/teeth, toileting, grooming, dressing, etc )  - Assess/evaluate cause of self-care deficits   - Assess range of motion  - Assess patient's mobility  - Assess patient's need for assistive devices and provide as appropriate  - Encourage maximum independence but intervene and supervise when necessary  - Involve family in performance of ADLs  - Assess for home care needs following discharge   - Consider OT consult to assist with ADL evaluation and planning for discharge  - Provide patient education as appropriate  Outcome: Progressing  Goal: Maintain proper alignment of affected body part  Description: INTERVENTIONS:  - Support, maintain and protect limb and body alignment  - Provide patient/ family with appropriate education  Outcome: Progressing

## 2023-01-21 NOTE — ASSESSMENT & PLAN NOTE
· Patient was transferred from [address] [place] [address] due to fever and positive blood cultures  · Blood culture growing Enterobacter  · His Port-A-Cath was thought to be the source  · Port-A-Cath removed by interventional radiology  · Repeat blood cultures no growth at 72 hours  · Spoke with ID  They are changing Cefepime to oral levaquin--->> patient initially completed antibiotic therapy as of 1/17    Loop recorder is not in blood stream and is not thought to be infected  Appreciate ID following along; have spoken with patient's family on 1/18; will reinitiate amoxicillin over concerns for elevated white count  May repeat CT abdomen with contrast     Repeat CT abdomen pelvis with no acute findings; redemonstrated bladder cancer with Shirley catheter insertion and stable metastatic disease in liver  Patient had repeat UA done yesterday; has nitrite positive urine with innumerable bacteria; but likely colonized as sample taken from Shirley catheter  Consideration for discontinuing Shirley catheter and obtaining sample not through the catheter was had; but without every 4 hours irrigation, patient at very serious risk of developing blood clots of the bladder and potentially causing more harm than good  Restarted on antibiotics by infectious disease; blood cultures obtained on 1/19-negative at 24 hours X2  Additionally, on 1/21, despite being on antibiotics, white count trending up; patient remains afebrile and stable

## 2023-01-21 NOTE — ACP (ADVANCE CARE PLANNING)
Advanced Care Planning Progress Note    Serious Illness Conversation    1  What is your understanding now of where you are with your illness? Prognostic Understanding: appropriate understanding of prognosis  Discussed with patient his understanding of his illness; patient had good insight and stated "I think it is more serious than my family leads me to believe"  Patient also stated he had a friend who recently passed from cancer; he stated that, unlike his friend, he has accepted that this illness is non-survivable  2  How much information about what is likely to be ahead with your illness would you like to have? Information: patient wants to be fully informed     3  What did you (clinician) communicate to the patient? Prognostic Communication: Uncertain - It can be difficult to predict what will happen with your illness  I hope you will continue to live well for a long time but I’m worried that you could get sick quickly, and I think it is important to prepare for that possibility  Patient reports a strong domitila and believes that God will ultimately determine his longevity; however he realizes that despite what others are telling him, that he has a very serious life-threatening illness  I told him I was very sorry you are having this conversation, but agreed with his assessment and discussed new imaging findings on his recent CT  Additionally, I asked patient if he were to have a heart attack or respiratory arrest if he would want heroic measures and explained to him the process of CPR and mechanical ventilation; patient reported that he did not want those measures and requested to be DNR/DNI  Patient reported that he and his family may benefit from family meeting; will discuss details more on 1/22  4  If your health situation worsens, what are your most important goals?   Goals: be independent, be physically comfortable, not be a burden  Patient did report concern over "that half of the family" that was unaware of the severity of his illness  At at 1 point in the conversation, he stated that it may be for the best they do not know  But later, after reflecting on our conversation, he reported that maybe they should know  Either way, apparent that patient does not 1 to be a burden on his family and he stated emphatically, he now wants to make decisions moving forward  5  What are the biggest fears and worries about the future and your health? Fears/Worries: being an emotional burden, loss of control     6  What abilities are so critical to your life that you cannot imagine living without them? 7  What gives you strength as you think about the future with your illness? 8  If you become sicker, how much are you willing to go through for the possibility of gaining more time? Be in the hospital: Yes    Be in the ICU: Yes Live in a nursing home: Yes   Be on a ventilator: No Be uncomfortable: No    Undergo aggressive test and/or procedures: Yes   9  How much does your proxy and family know about your priorities and wishes? Discussion Discussion: no discussion, wants help in talking to family  Rain Ronquillo states that part of his family is aware of his illness and the extent of the metastatic disease; but states the other part of his family is not  Did express interest in family meeting with the family that was unaware of the extent of his illness  I’ve heard you say that independence and being fully informed is really important to you  Keeping that in mind, and what we know about your illness, I recommend that we conduct a family meeting and enact your individual requests  This will help us make sure that your treatment plans reflect what’s important to you  How does this plan sound to you? I will do everything I can to help you through this    Patient verbalized understanding of the plan     I have spent 30 minutes speaking with my patient on advanced care planning today or during this visit Advanced directives         Emile Diaz MD

## 2023-01-21 NOTE — PLAN OF CARE
Problem: Nutrition/Hydration-ADULT  Goal: Nutrient/Hydration intake appropriate for improving, restoring or maintaining nutritional needs  Description: Monitor and assess patient's nutrition/hydration status for malnutrition  Collaborate with interdisciplinary team and initiate plan and interventions as ordered  Monitor patient's weight and dietary intake as ordered or per policy  Utilize nutrition screening tool and intervene as necessary  Determine patient's food preferences and provide high-protein, high-caloric foods as appropriate       INTERVENTIONS:  - Monitor oral intake, urinary output, labs, and treatment plans  - Assess nutrition and hydration status and recommend course of action  - Evaluate amount of meals eaten  - Assist patient with eating if necessary   - Allow adequate time for meals  - Recommend/ encourage appropriate diets, oral nutritional supplements, and vitamin/mineral supplements  - Order, calculate, and assess calorie counts as needed  - Recommend, monitor, and adjust tube feedings and TPN/PPN based on assessed needs  - Assess need for intravenous fluids  - Provide specific nutrition/hydration education as appropriate  - Include patient/family/caregiver in decisions related to nutrition  Outcome: Progressing     Problem: Prexisting or High Potential for Compromised Skin Integrity  Goal: Skin integrity is maintained or improved  Description: INTERVENTIONS:  - Identify patients at risk for skin breakdown  - Assess and monitor skin integrity  - Assess and monitor nutrition and hydration status  - Monitor labs   - Assess for incontinence   - Turn and reposition patient  - Assist with mobility/ambulation  - Relieve pressure over bony prominences  - Avoid friction and shearing  - Provide appropriate hygiene as needed including keeping skin clean and dry  - Evaluate need for skin moisturizer/barrier cream  - Collaborate with interdisciplinary team   - Patient/family teaching  - Consider wound care consult   Outcome: Progressing     Problem: MOBILITY - ADULT  Goal: Maintain or return to baseline ADL function  Description: INTERVENTIONS:  -  Assess patient's ability to carry out ADLs; assess patient's baseline for ADL function and identify physical deficits which impact ability to perform ADLs (bathing, care of mouth/teeth, toileting, grooming, dressing, etc )  - Assess/evaluate cause of self-care deficits   - Assess range of motion  - Assess patient's mobility; develop plan if impaired  - Assess patient's need for assistive devices and provide as appropriate  - Encourage maximum independence but intervene and supervise when necessary  - Involve family in performance of ADLs  - Assess for home care needs following discharge   - Consider OT consult to assist with ADL evaluation and planning for discharge  - Provide patient education as appropriate  Outcome: Progressing  Goal: Maintains/Returns to pre admission functional level  Description: INTERVENTIONS:  - Perform BMAT or MOVE assessment daily    - Set and communicate daily mobility goal to care team and patient/family/caregiver  - Collaborate with rehabilitation services on mobility goals if consulted  - Perform Range of Motion 3 times a day  - Reposition patient every 2 hours    - Dangle patient 3 times a day  - Stand patient 3 times a day  - Ambulate patient 3 times a day  - Out of bed to chair 3 times a day   - Out of bed for meals 3 times a day  - Out of bed for toileting  - Record patient progress and toleration of activity level   Outcome: Progressing     Problem: Potential for Falls  Goal: Patient will remain free of falls  Description: INTERVENTIONS:  - Educate patient/family on patient safety including physical limitations  - Instruct patient to call for assistance with activity   - Consult OT/PT to assist with strengthening/mobility   - Keep Call bell within reach  - Keep bed low and locked with side rails adjusted as appropriate  - Keep care items and personal belongings within reach  - Initiate and maintain comfort rounds  - Make Fall Risk Sign visible to staff  - Offer Toileting every 2 Hours, in advance of need  - Initiate/Maintain bed alarm  - Obtain necessary fall risk management equipment: Alarm  - Apply yellow socks and bracelet for high fall risk patients  - Consider moving patient to room near nurses station  Outcome: Progressing     Problem: PAIN - ADULT  Goal: Verbalizes/displays adequate comfort level or baseline comfort level  Description: Interventions:  - Encourage patient to monitor pain and request assistance  - Assess pain using appropriate pain scale  - Administer analgesics based on type and severity of pain and evaluate response  - Implement non-pharmacological measures as appropriate and evaluate response  - Consider cultural and social influences on pain and pain management  - Notify physician/advanced practitioner if interventions unsuccessful or patient reports new pain  Outcome: Progressing     Problem: INFECTION - ADULT  Goal: Absence or prevention of progression during hospitalization  Description: INTERVENTIONS:  - Assess and monitor for signs and symptoms of infection  - Monitor lab/diagnostic results  - Monitor all insertion sites, i e  indwelling lines, tubes, and drains  - Monitor endotracheal if appropriate and nasal secretions for changes in amount and color  - [place] appropriate cooling/warming therapies per order  - Administer medications as ordered  - Instruct and encourage patient and family to use good hand hygiene technique  - Identify and instruct in appropriate isolation precautions for identified infection/condition  Outcome: Progressing  Goal: Absence of fever/infection during neutropenic period  Description: INTERVENTIONS:  - Monitor WBC    Outcome: Progressing     Problem: SAFETY ADULT  Goal: Maintain or return to baseline ADL function  Description: INTERVENTIONS:  -  Assess patient's ability to carry out ADLs; assess patient's baseline for ADL function and identify physical deficits which impact ability to perform ADLs (bathing, care of mouth/teeth, toileting, grooming, dressing, etc )  - Assess/evaluate cause of self-care deficits   - Assess range of motion  - Assess patient's mobility; develop plan if impaired  - Assess patient's need for assistive devices and provide as appropriate  - Encourage maximum independence but intervene and supervise when necessary  - Involve family in performance of ADLs  - Assess for home care needs following discharge   - Consider OT consult to assist with ADL evaluation and planning for discharge  - Provide patient education as appropriate  Outcome: Progressing  Goal: Maintains/Returns to pre admission functional level  Description: INTERVENTIONS:  - Perform BMAT or MOVE assessment daily    - Set and communicate daily mobility goal to care team and patient/family/caregiver  - Collaborate with rehabilitation services on mobility goals if consulted  - Perform Range of Motion 3 times a day  - Reposition patient every 2 hours    - Dangle patient 3 times a day  - Stand patient 3 times a day  - Ambulate patient 3 times a day  - Out of bed to chair 3 times a day   - Out of bed for meals 3 times a day  - Out of bed for toileting  - Record patient progress and toleration of activity level   Outcome: Progressing  Goal: Patient will remain free of falls  Description: INTERVENTIONS:  - Educate patient/family on patient safety including physical limitations  - Instruct patient to call for assistance with activity   - Consult OT/PT to assist with strengthening/mobility   - Keep Call bell within reach  - Keep bed low and locked with side rails adjusted as appropriate  - Keep care items and personal belongings within reach  - Initiate and maintain comfort rounds  - Make Fall Risk Sign visible to staff  - Offer Toileting every 2 Hours, in advance of need  - Initiate/Maintain bed alarm  - Obtain necessary fall risk management equipment: Alarm  - Apply yellow socks and bracelet for high fall risk patients  - Consider moving patient to room near nurses station  Outcome: Progressing     Problem: GENITOURINARY - ADULT  Goal: Maintains or returns to baseline urinary function  Description: INTERVENTIONS:  - Assess urinary function  - Encourage oral fluids to ensure adequate hydration if ordered  - Administer IV fluids as ordered to ensure adequate hydration  - Administer ordered medications as needed  - Offer frequent toileting  - Follow urinary retention protocol if ordered  Outcome: Progressing  Goal: Absence of urinary retention  Description: INTERVENTIONS:  - Assess patient’s ability to void and empty bladder  - Monitor I/O  - Bladder scan as needed  - Discuss with physician/AP medications to alleviate retention as needed  - Discuss catheterization for long term situations as appropriate  Outcome: Progressing  Goal: Urinary catheter remains patent  Description: INTERVENTIONS:  - Assess patency of urinary catheter  - If patient has a chronic wagner, consider changing catheter if non-functioning  - Follow guidelines for intermittent irrigation of non-functioning urinary catheter  Outcome: Progressing     Problem: HEMATOLOGIC - ADULT  Goal: Maintains hematologic stability  Description: INTERVENTIONS  - Assess for signs and symptoms of bleeding or hemorrhage  - Monitor labs  - Administer supportive blood products/factors as ordered and appropriate  Outcome: Progressing     Problem: MUSCULOSKELETAL - ADULT  Goal: Maintain or return mobility to safest level of function  Description: INTERVENTIONS:  - Assess patient's ability to carry out ADLs; assess patient's baseline for ADL function and identify physical deficits which impact ability to perform ADLs (bathing, care of mouth/teeth, toileting, grooming, dressing, etc )  - Assess/evaluate cause of self-care deficits   - Assess range of motion  - Assess patient's mobility  - Assess patient's need for assistive devices and provide as appropriate  - Encourage maximum independence but intervene and supervise when necessary  - Involve family in performance of ADLs  - Assess for home care needs following discharge   - Consider OT consult to assist with ADL evaluation and planning for discharge  - Provide patient education as appropriate  Outcome: Progressing  Goal: Maintain proper alignment of affected body part  Description: INTERVENTIONS:  - Support, maintain and protect limb and body alignment  - Provide patient/ family with appropriate education  Outcome: Progressing

## 2023-01-21 NOTE — ASSESSMENT & PLAN NOTE
· Patient has persistent hematuria due to bladder cancer  History of recurrent UTI on suppressive Bactrim  · CT A/P: Stable bladder wall thickening, compatible with known malignancy  Increased hyperdensity within the bladder lumen, compatible with new blood clot  · Recent admission at [facility] 12/12-12/22 for hematuria and obstructive uropathy in the setting of known bladder cancer s/p BCG and subsequent chemotherapy/radiation s/p bilateral chronic PCN tubes   Noted to have new right-sided hydronephrosis on CT s/p cystoscopy, clot evacuation w/ right ureteral stenting on 12/14 by Dr Amber Villeda  · UA bloody, innumerable RBC/WBC, +leuks, no nitrites    Patient initially was on CBI per urology  Now transition to hand irrigation several times per day  Urology does not feel patient needs anything other than this right now    He unfortunately is likely to have chronic hematuria

## 2023-01-21 NOTE — ASSESSMENT & PLAN NOTE
Lab Results   Component Value Date    HGBA1C 7 2 (H) 10/20/2022     · Patient with poor evening glucose readings; will increase Lantus to 18 units daily  · ADA diet  · Family reports allergy to humalog, so that is not being used    Recent Labs     01/20/23  0708 01/20/23  1057 01/20/23 2037 01/21/23  0749   POCGLU 148* 235* 321* 124       Blood Sugar Average: Last 72 hrs:  (P) 624 [id number]

## 2023-01-22 LAB
ANION GAP SERPL CALCULATED.3IONS-SCNC: 8 MMOL/L (ref 4–13)
BACTERIA UR CULT: ABNORMAL
BASOPHILS # BLD AUTO: 0.06 THOUSANDS/ÂΜL (ref 0–0.1)
BASOPHILS NFR BLD AUTO: 1 % (ref 0–1)
BUN SERPL-MCNC: 52 MG/DL (ref 5–25)
CALCIUM SERPL-MCNC: 8.9 MG/DL (ref 8.4–10.2)
CHLORIDE SERPL-SCNC: 101 MMOL/L (ref 96–108)
CO2 SERPL-SCNC: 26 MMOL/L (ref 21–32)
CREAT SERPL-MCNC: 2.97 MG/DL (ref 0.6–1.3)
EOSINOPHIL # BLD AUTO: 0.96 THOUSAND/ÂΜL (ref 0–0.61)
EOSINOPHIL NFR BLD AUTO: 8 % (ref 0–6)
ERYTHROCYTE [DISTWIDTH] IN BLOOD BY AUTOMATED COUNT: 17 % (ref 11.6–15.1)
GFR SERPL CREATININE-BSD FRML MDRD: 19 ML/MIN/1.73SQ M
GLUCOSE SERPL-MCNC: 172 MG/DL (ref 65–140)
GLUCOSE SERPL-MCNC: 175 MG/DL (ref 65–140)
GLUCOSE SERPL-MCNC: 191 MG/DL (ref 65–140)
GLUCOSE SERPL-MCNC: 225 MG/DL (ref 65–140)
GLUCOSE SERPL-MCNC: 266 MG/DL (ref 65–140)
HCT VFR BLD AUTO: 25.2 % (ref 36.5–49.3)
HGB BLD-MCNC: 7.7 G/DL (ref 12–17)
IMM GRANULOCYTES # BLD AUTO: 0.1 THOUSAND/UL (ref 0–0.2)
IMM GRANULOCYTES NFR BLD AUTO: 1 % (ref 0–2)
LYMPHOCYTES # BLD AUTO: 2.15 THOUSANDS/ÂΜL (ref 0.6–4.47)
LYMPHOCYTES NFR BLD AUTO: 18 % (ref 14–44)
MCH RBC QN AUTO: 28.5 PG (ref 26.8–34.3)
MCHC RBC AUTO-ENTMCNC: 30.6 G/DL (ref 31.4–37.4)
MCV RBC AUTO: 93 FL (ref 82–98)
MONOCYTES # BLD AUTO: 1.03 THOUSAND/ÂΜL (ref 0.17–1.22)
MONOCYTES NFR BLD AUTO: 9 % (ref 4–12)
NEUTROPHILS # BLD AUTO: 7.86 THOUSANDS/ÂΜL (ref 1.85–7.62)
NEUTS SEG NFR BLD AUTO: 63 % (ref 43–75)
NRBC BLD AUTO-RTO: 0 /100 WBCS
PLATELET # BLD AUTO: 277 THOUSANDS/UL (ref 149–390)
PMV BLD AUTO: 9.5 FL (ref 8.9–12.7)
POTASSIUM SERPL-SCNC: 4.6 MMOL/L (ref 3.5–5.3)
RBC # BLD AUTO: 2.7 MILLION/UL (ref 3.88–5.62)
SODIUM SERPL-SCNC: 135 MMOL/L (ref 135–147)
WBC # BLD AUTO: 12.16 THOUSAND/UL (ref 4.31–10.16)

## 2023-01-22 RX ORDER — FLUCONAZOLE 100 MG/1
200 TABLET ORAL ONCE
Status: COMPLETED | OUTPATIENT
Start: 2023-01-22 | End: 2023-01-22

## 2023-01-22 RX ORDER — FLUCONAZOLE 100 MG/1
100 TABLET ORAL DAILY
Status: DISCONTINUED | OUTPATIENT
Start: 2023-01-23 | End: 2023-01-23

## 2023-01-22 RX ADMIN — OXYBUTYNIN CHLORIDE 5 MG: 5 TABLET ORAL at 15:30

## 2023-01-22 RX ADMIN — FUROSEMIDE 20 MG: 20 TABLET ORAL at 09:56

## 2023-01-22 RX ADMIN — OXYCODONE HYDROCHLORIDE 10 MG: 10 TABLET ORAL at 13:56

## 2023-01-22 RX ADMIN — ARIPIPRAZOLE 2 MG: 2 TABLET ORAL at 09:56

## 2023-01-22 RX ADMIN — EZETIMIBE 10 MG: 10 TABLET ORAL at 09:57

## 2023-01-22 RX ADMIN — OXYBUTYNIN CHLORIDE 5 MG: 5 TABLET ORAL at 22:19

## 2023-01-22 RX ADMIN — OXYBUTYNIN CHLORIDE 5 MG: 5 TABLET ORAL at 09:57

## 2023-01-22 RX ADMIN — OXYCODONE HYDROCHLORIDE 10 MG: 10 TABLET ORAL at 22:20

## 2023-01-22 RX ADMIN — CALCITRIOL 0.25 MCG: 0.25 CAPSULE, LIQUID FILLED ORAL at 09:56

## 2023-01-22 RX ADMIN — FLUCONAZOLE 200 MG: 100 TABLET ORAL at 13:56

## 2023-01-22 RX ADMIN — Medication 3 MG: at 22:20

## 2023-01-22 RX ADMIN — CYANOCOBALAMIN TAB 500 MCG 1000 MCG: 500 TAB at 09:57

## 2023-01-22 RX ADMIN — PANTOPRAZOLE SODIUM 20 MG: 20 TABLET, DELAYED RELEASE ORAL at 05:54

## 2023-01-22 RX ADMIN — BIMATOPROST 1 DROP: 0.1 SOLUTION/ DROPS OPHTHALMIC at 22:19

## 2023-01-22 RX ADMIN — GABAPENTIN 300 MG: 300 CAPSULE ORAL at 22:20

## 2023-01-22 RX ADMIN — METOPROLOL TARTRATE 25 MG: 25 TABLET, FILM COATED ORAL at 09:57

## 2023-01-22 RX ADMIN — INSULIN GLARGINE 18 UNITS: 100 INJECTION, SOLUTION SUBCUTANEOUS at 22:20

## 2023-01-22 RX ADMIN — METOPROLOL TARTRATE 25 MG: 25 TABLET, FILM COATED ORAL at 22:22

## 2023-01-22 RX ADMIN — OXYCODONE HYDROCHLORIDE 10 MG: 10 TABLET ORAL at 09:56

## 2023-01-22 RX ADMIN — AMPICILLIN SODIUM 2000 MG: 2 INJECTION, POWDER, FOR SOLUTION INTRAMUSCULAR; INTRAVENOUS at 13:56

## 2023-01-22 RX ADMIN — AMPICILLIN SODIUM 2000 MG: 2 INJECTION, POWDER, FOR SOLUTION INTRAMUSCULAR; INTRAVENOUS at 02:31

## 2023-01-22 NOTE — ASSESSMENT & PLAN NOTE
Malnutrition Findings:   Adult Malnutrition type: Chronic illness  Adult Degree of Malnutrition: Other severe protein calorie malnutrition  Malnutrition Characteristics: Weight loss, Muscle loss, Fat loss                  360 Statement: related to inadequate energy intake as evidenced by 6% weight loss 12/20/22-1/11/23, hollowing of supraclavicular space, wasted interosseous, hollowing orbital  Intervention CCD diet, medical food supplements with meals  BMI Findings: Body mass index is 23 43 kg/m²

## 2023-01-22 NOTE — ASSESSMENT & PLAN NOTE
Lab Results   Component Value Date    HGBA1C 7 2 (H) 10/20/2022     · Patient with poor evening glucose readings; will increase Lantus to 18 units daily  · ADA diet  · Family reports allergy to humalog, so that is not being used    Recent Labs     01/21/23  2040 01/22/23  0747 01/22/23  1213 01/22/23  1612   POCGLU 235* 266* 225* 172*       Blood Sugar Average: Last 72 hrs:  (P) 549 [id number]

## 2023-01-22 NOTE — ASSESSMENT & PLAN NOTE
· Patient was transferred from [address] [place] [address] due to fever and positive blood cultures  · Blood culture growing Enterobacter  · His Port-A-Cath was thought to be the source  · Port-A-Cath removed by interventional radiology  · Repeat blood cultures no growth at 72 hours  · Spoke with ID  They are changing Cefepime to oral levaquin--->> patient initially completed antibiotic therapy as of 1/17    Loop recorder is not in blood stream and is not thought to be infected  Appreciate ID following along; have spoken with patient's family on 1/18; will reinitiate amoxicillin over concerns for elevated white count  May repeat CT abdomen with contrast     Repeat CT abdomen pelvis with no acute findings; redemonstrated bladder cancer with Shirley catheter insertion and stable metastatic disease in liver  Patient had repeat UA done yesterday; has nitrite positive urine with innumerable bacteria; but likely colonized as sample taken from Shirley catheter  Consideration for discontinuing Shirley catheter and obtaining sample not through the catheter was had; but without every 4 hours irrigation, patient at very serious risk of developing blood clots of the bladder and potentially causing more harm than good  Restarted on antibiotics by infectious disease; blood cultures obtained on 1/19-negative at 48 hours X2  Additionally, on 1/21, despite being on antibiotics, white count trending up; patient remains afebrile and stable

## 2023-01-22 NOTE — ASSESSMENT & PLAN NOTE
Lab Results   Component Value Date    EGFR 19 01/22/2023    EGFR 20 01/21/2023    EGFR 19 01/20/2023    CREATININE 2 97 (H) 01/22/2023    CREATININE 2 79 (H) 01/21/2023    CREATININE 2 87 (H) 01/20/2023     · Chronic CKD 4;  baseline appears to be around 3 0-3 4    Monitor BMP

## 2023-01-22 NOTE — ASSESSMENT & PLAN NOTE
· History of hematuria due to bladder cancer  · Hg 7 1 prior to transfer   Received 1 unit PRBC  · Iron panel showing continued deficiency  · Seen in consult by hematology oncology    Hemoglobin 8 2 > 8 1 > 8 0 > 7 9 > 8 >7 7>8 8>9 1>8 3>7 7>7 7    Hemoglobin slowly dropping in the setting of hematuria; consider transfusion prior to discharge  Monitor on morning labs

## 2023-01-22 NOTE — PLAN OF CARE
Problem: Nutrition/Hydration-ADULT  Goal: Nutrient/Hydration intake appropriate for improving, restoring or maintaining nutritional needs  Description: Monitor and assess patient's nutrition/hydration status for malnutrition  Collaborate with interdisciplinary team and initiate plan and interventions as ordered  Monitor patient's weight and dietary intake as ordered or per policy  Utilize nutrition screening tool and intervene as necessary  Determine patient's food preferences and provide high-protein, high-caloric foods as appropriate       INTERVENTIONS:  - Monitor oral intake, urinary output, labs, and treatment plans  - Assess nutrition and hydration status and recommend course of action  - Evaluate amount of meals eaten  - Assist patient with eating if necessary   - Allow adequate time for meals  - Recommend/ encourage appropriate diets, oral nutritional supplements, and vitamin/mineral supplements  - Order, calculate, and assess calorie counts as needed  - Recommend, monitor, and adjust tube feedings and TPN/PPN based on assessed needs  - Assess need for intravenous fluids  - Provide specific nutrition/hydration education as appropriate  - Include patient/family/caregiver in decisions related to nutrition  Outcome: Progressing     Problem: Prexisting or High Potential for Compromised Skin Integrity  Goal: Skin integrity is maintained or improved  Description: INTERVENTIONS:  - Identify patients at risk for skin breakdown  - Assess and monitor skin integrity  - Assess and monitor nutrition and hydration status  - Monitor labs   - Assess for incontinence   - Turn and reposition patient  - Assist with mobility/ambulation  - Relieve pressure over bony prominences  - Avoid friction and shearing  - Provide appropriate hygiene as needed including keeping skin clean and dry  - Evaluate need for skin moisturizer/barrier cream  - Collaborate with interdisciplinary team   - Patient/family teaching  - Consider wound care consult   Outcome: Progressing     Problem: MOBILITY - ADULT  Goal: Maintain or return to baseline ADL function  Description: INTERVENTIONS:  -  Assess patient's ability to carry out ADLs; assess patient's baseline for ADL function and identify physical deficits which impact ability to perform ADLs (bathing, care of mouth/teeth, toileting, grooming, dressing, etc )  - Assess/evaluate cause of self-care deficits   - Assess range of motion  - Assess patient's mobility; develop plan if impaired  - Assess patient's need for assistive devices and provide as appropriate  - Encourage maximum independence but intervene and supervise when necessary  - Involve family in performance of ADLs  - Assess for home care needs following discharge   - Consider OT consult to assist with ADL evaluation and planning for discharge  - Provide patient education as appropriate  Outcome: Progressing  Goal: Maintains/Returns to pre admission functional level  Description: INTERVENTIONS:  - Perform BMAT or MOVE assessment daily    - Set and communicate daily mobility goal to care team and patient/family/caregiver  - Collaborate with rehabilitation services on mobility goals if consulted  - Perform Range of Motion 3 times a day  - Reposition patient every 2 hours    - Dangle patient 3 times a day  - Stand patient 3 times a day  - Ambulate patient 3 times a day  - Out of bed to chair 3 times a day   - Out of bed for meals 3 times a day  - Out of bed for toileting  - Record patient progress and toleration of activity level   Outcome: Progressing     Problem: Potential for Falls  Goal: Patient will remain free of falls  Description: INTERVENTIONS:  - Educate patient/family on patient safety including physical limitations  - Instruct patient to call for assistance with activity   - Consult OT/PT to assist with strengthening/mobility   - Keep Call bell within reach  - Keep bed low and locked with side rails adjusted as appropriate  - Keep care items and personal belongings within reach  - Initiate and maintain comfort rounds  - Make Fall Risk Sign visible to staff  - Offer Toileting every 2 Hours, in advance of need  - Initiate/Maintain bed alarm  - Obtain necessary fall risk management equipment: Alarm  - Apply yellow socks and bracelet for high fall risk patients  - Consider moving patient to room near nurses station  Outcome: Progressing     Problem: PAIN - ADULT  Goal: Verbalizes/displays adequate comfort level or baseline comfort level  Description: Interventions:  - Encourage patient to monitor pain and request assistance  - Assess pain using appropriate pain scale  - Administer analgesics based on type and severity of pain and evaluate response  - Implement non-pharmacological measures as appropriate and evaluate response  - Consider cultural and social influences on pain and pain management  - Notify physician/advanced practitioner if interventions unsuccessful or patient reports new pain  Outcome: Progressing     Problem: INFECTION - ADULT  Goal: Absence or prevention of progression during hospitalization  Description: INTERVENTIONS:  - Assess and monitor for signs and symptoms of infection  - Monitor lab/diagnostic results  - Monitor all insertion sites, i e  indwelling lines, tubes, and drains  - Monitor endotracheal if appropriate and nasal secretions for changes in amount and color  - [place] appropriate cooling/warming therapies per order  - Administer medications as ordered  - Instruct and encourage patient and family to use good hand hygiene technique  - Identify and instruct in appropriate isolation precautions for identified infection/condition  Outcome: Progressing  Goal: Absence of fever/infection during neutropenic period  Description: INTERVENTIONS:  - Monitor WBC    Outcome: Progressing     Problem: SAFETY ADULT  Goal: Maintain or return to baseline ADL function  Description: INTERVENTIONS:  -  Assess patient's ability to carry out ADLs; assess patient's baseline for ADL function and identify physical deficits which impact ability to perform ADLs (bathing, care of mouth/teeth, toileting, grooming, dressing, etc )  - Assess/evaluate cause of self-care deficits   - Assess range of motion  - Assess patient's mobility; develop plan if impaired  - Assess patient's need for assistive devices and provide as appropriate  - Encourage maximum independence but intervene and supervise when necessary  - Involve family in performance of ADLs  - Assess for home care needs following discharge   - Consider OT consult to assist with ADL evaluation and planning for discharge  - Provide patient education as appropriate  Outcome: Progressing  Goal: Maintains/Returns to pre admission functional level  Description: INTERVENTIONS:  - Perform BMAT or MOVE assessment daily    - Set and communicate daily mobility goal to care team and patient/family/caregiver  - Collaborate with rehabilitation services on mobility goals if consulted  - Perform Range of Motion 3 times a day  - Reposition patient every 2 hours    - Dangle patient 3 times a day  - Stand patient 3 times a day  - Ambulate patient 3 times a day  - Out of bed to chair 3 times a day   - Out of bed for meals 3 times a day  - Out of bed for toileting  - Record patient progress and toleration of activity level   Outcome: Progressing  Goal: Patient will remain free of falls  Description: INTERVENTIONS:  - Educate patient/family on patient safety including physical limitations  - Instruct patient to call for assistance with activity   - Consult OT/PT to assist with strengthening/mobility   - Keep Call bell within reach  - Keep bed low and locked with side rails adjusted as appropriate  - Keep care items and personal belongings within reach  - Initiate and maintain comfort rounds  - Make Fall Risk Sign visible to staff  - Offer Toileting every 2 Hours, in advance of need  - Initiate/Maintain bed alarm  - Obtain necessary fall risk management equipment: Alarm  - Apply yellow socks and bracelet for high fall risk patients  - Consider moving patient to room near nurses station  Outcome: Progressing     Problem: GENITOURINARY - ADULT  Goal: Maintains or returns to baseline urinary function  Description: INTERVENTIONS:  - Assess urinary function  - Encourage oral fluids to ensure adequate hydration if ordered  - Administer IV fluids as ordered to ensure adequate hydration  - Administer ordered medications as needed  - Offer frequent toileting  - Follow urinary retention protocol if ordered  Outcome: Progressing  Goal: Absence of urinary retention  Description: INTERVENTIONS:  - Assess patient’s ability to void and empty bladder  - Monitor I/O  - Bladder scan as needed  - Discuss with physician/AP medications to alleviate retention as needed  - Discuss catheterization for long term situations as appropriate  Outcome: Progressing  Goal: Urinary catheter remains patent  Description: INTERVENTIONS:  - Assess patency of urinary catheter  - If patient has a chronic wagner, consider changing catheter if non-functioning  - Follow guidelines for intermittent irrigation of non-functioning urinary catheter  Outcome: Progressing     Problem: HEMATOLOGIC - ADULT  Goal: Maintains hematologic stability  Description: INTERVENTIONS  - Assess for signs and symptoms of bleeding or hemorrhage  - Monitor labs  - Administer supportive blood products/factors as ordered and appropriate  Outcome: Progressing     Problem: MUSCULOSKELETAL - ADULT  Goal: Maintain or return mobility to safest level of function  Description: INTERVENTIONS:  - Assess patient's ability to carry out ADLs; assess patient's baseline for ADL function and identify physical deficits which impact ability to perform ADLs (bathing, care of mouth/teeth, toileting, grooming, dressing, etc )  - Assess/evaluate cause of self-care deficits   - Assess range of motion  - Assess patient's mobility  - Assess patient's need for assistive devices and provide as appropriate  - Encourage maximum independence but intervene and supervise when necessary  - Involve family in performance of ADLs  - Assess for home care needs following discharge   - Consider OT consult to assist with ADL evaluation and planning for discharge  - Provide patient education as appropriate  Outcome: Progressing  Goal: Maintain proper alignment of affected body part  Description: INTERVENTIONS:  - Support, maintain and protect limb and body alignment  - Provide patient/ family with appropriate education  Outcome: Progressing

## 2023-01-22 NOTE — PROGRESS NOTES
Shannan Griffin  Progress Note - Zay Pilger 1945, 76 y o  male MRN: [id number]  Unit/Bed#: E5 -01 Encounter: [id number]  Primary Care Provider: Ascencion White MD   Date and time admitted to hospital: 1/7/2023 10:17 PM    Severe protein-calorie malnutrition [facility])  Assessment & Plan  Malnutrition Findings:   Adult Malnutrition type: Chronic illness  Adult Degree of Malnutrition: Other severe protein calorie malnutrition  Malnutrition Characteristics: Weight loss, Muscle loss, Fat loss                  360 Statement: related to inadequate energy intake as evidenced by 6% weight loss 12/20/22-1/11/23, hollowing of supraclavicular space, wasted interosseous, hollowing orbital  Intervention CCD diet, medical food supplements with meals  BMI Findings: Body mass index is 23 43 kg/m²  Acute on chronic blood loss anemia  Assessment & Plan  · History of hematuria due to bladder cancer  · Hg 7 1 prior to transfer  Received 1 unit PRBC  · Iron panel showing continued deficiency  · Seen in consult by hematology oncology    Hemoglobin 8 2 > 8 1 > 8 0 > 7 9 > 8 >7 7>8 8>9 1>8 3>7 7>7 7    Hemoglobin slowly dropping in the setting of hematuria; consider transfusion prior to discharge  Monitor on morning labs    Nephrostomy status ([facility] [address] )  Assessment & Plan  · Chronic bilateral nephrostomy tubes  · Prior to transfer patient reported abdominal pain, CT was done and shows: Stable bilateral percutaneous nephrostomy tubes with interval placement of a right double-J nephroureteral stent  Stable mild right upper pole caliectasis, with resolution of previously seen lower pole caliectasis and hydroureter  Persistent blood   · Appreciate urology recommendations  · Monitor I&O    Elevated brain natriuretic peptide (BNP) level  Assessment & Plan  · BNP >2000   EF 55%, normal wall motion  · CT small stable left pleural effusion with drainage catheter in place  · Continue lasix 20 mg daily with hold parameters  · Monitor daily weight-stable  · Repeat echocardiogram noted    Chronic pleural effusion  Assessment & Plan  · Chronic pleural effusion  CT showing Stable small left pleural effusion with drainage catheter in place  Stable epicardial metastasis  · 1/18-chest CTA obtained for routine surveillance; did show increased fluid on left side  Patient with Pleurx catheter and nursing drained it; only 5 cc extracted  Monitor    Stage 4 chronic kidney disease [facility])  Assessment & Plan  Lab Results   Component Value Date    EGFR 19 01/22/2023    EGFR 20 01/21/2023    EGFR 19 01/20/2023    CREATININE 2 97 (H) 01/22/2023    CREATININE 2 79 (H) 01/21/2023    CREATININE 2 87 (H) 01/20/2023     · Chronic CKD 4;  baseline appears to be around 3 0-3 4  Monitor BMP    Continuous opioid dependence (HCC)  Assessment & Plan  · Maintained on oxycodone 10mg TID prn, verified on PDMP    Gross hematuria  Assessment & Plan  · Patient has persistent hematuria due to bladder cancer  History of recurrent UTI on suppressive Bactrim  · CT A/P: Stable bladder wall thickening, compatible with known malignancy  Increased hyperdensity within the bladder lumen, compatible with new blood clot  · Recent admission at [facility] 12/12-12/22 for hematuria and obstructive uropathy in the setting of known bladder cancer s/p BCG and subsequent chemotherapy/radiation s/p bilateral chronic PCN tubes   Noted to have new right-sided hydronephrosis on CT s/p cystoscopy, clot evacuation w/ right ureteral stenting on 12/14 by Dr Jocy Finn  · UA bloody, innumerable RBC/WBC, +leuks, no nitrites    Patient initially was on CBI per urology  Now transition to hand irrigation several times per day  Urology does not feel patient needs anything other than this right now    He unfortunately is likely to have chronic hematuria        Malignant neoplasm of anterior wall of urinary bladder ([facility] [address] )  Assessment & Plan  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, recurrence with CIS in 2016 BCG again  BCG refractory disease with pelvic metastases now s/p chemoradiation 2021, on current immunotherapy  · Chronic B/L PCN tubes  · CT A/P Jan 7: Stable bladder wall thickening, compatible with known malignancy  Stable metastatic retroperitoneal lymphadenopathy  Stable epicardial metastasis  · Per oncology note on Jan 6: Chemotherapy treatment should remain on hold for now untill he is clinically stable/discharged; particularly since he was positive flu  To follow-up with primary oncologist after discharge for reevaluation prior to resume treatment  · Port removed 2/2 bacteremia  · At this time, patient has stage IV metastatic bladder cancer with the following findings on recent CT:    "Progression of pleural-based tumor in the left hemithorax, even when compared to recent abdominopelvic CT examination of just 10 days earlier  Small loculated left pleural collection, increased in size when compared to October 4, 2022 despite the   presence of pleural drainage catheter      Hypodensities in the right hepatic lobe highly suspicious for developing metastatic disease, new from October 2022  increased in size from examination of just 10 days earlier "    Working with patient and family to define expectations and provide optimal treatment  Patient stable from urology point of view for discharge with indwelling Shirley catheter and manual irrigation until followed up in the office  We will attempt to arrange advanced care planning meeting; follows with palliative care in the outpatient setting  No longer septic; labs and vitals largely stable      Type 2 diabetes mellitus, with long-term current use of insulin (HCC)  Assessment & Plan  Lab Results   Component Value Date    HGBA1C 7 2 (H) 10/20/2022     · Patient with poor evening glucose readings; will increase Lantus to 18 units daily  · ADA diet  · Family reports allergy to humalog, so that is not being used    Recent Labs     01/21/23  2040 01/22/23  0747 01/22/23  1213 01/22/23  1612   POCGLU 235* 266* 225* 172*       Blood Sugar Average: Last 72 hrs:  (P) 210 [id number]    Coronary artery disease of native artery of native heart with stable angina pectoris (HCC)  Assessment & Plan  · Outpatient regimen ASA, Imdur, metoprolol, zetia  · Repeat echocardiogram noted; EF 55% with grade 1 diastolic dysfunction    * Bacteremia due to Enterobacter species  Assessment & Plan  · Patient was transferred from [address] [place] [address] due to fever and positive blood cultures  · Blood culture growing Enterobacter  · His Port-A-Cath was thought to be the source  · Port-A-Cath removed by interventional radiology  · Repeat blood cultures no growth at 72 hours  · Spoke with ID  They are changing Cefepime to oral levaquin--->> patient initially completed antibiotic therapy as of 1/17    Loop recorder is not in blood stream and is not thought to be infected  Appreciate ID following along; have spoken with patient's family on 1/18; will reinitiate amoxicillin over concerns for elevated white count  May repeat CT abdomen with contrast     Repeat CT abdomen pelvis with no acute findings; redemonstrated bladder cancer with Shirley catheter insertion and stable metastatic disease in liver  Patient had repeat UA done yesterday; has nitrite positive urine with innumerable bacteria; but likely colonized as sample taken from Shirley catheter  Consideration for discontinuing Shirley catheter and obtaining sample not through the catheter was had; but without every 4 hours irrigation, patient at very serious risk of developing blood clots of the bladder and potentially causing more harm than good  Restarted on antibiotics by infectious disease; blood cultures obtained on 1/19-negative at 48 hours X2  Additionally, on 1/21, despite being on antibiotics, white count trending up; patient remains afebrile and stable            VTE Pharmacologic Prophylaxis:   Pharmacologic: Pharmacologic VTE Prophylaxis contraindicated due to Hematuria  Mechanical VTE Prophylaxis in Place: Yes    Patient Centered Rounds: I have performed bedside rounds with nursing staff today  Discussions with Specialists or Other Care Team Provider: Infectious disease    Education and Discussions with Family / Patient: Discussed treatment plan with family and patient who agree with current plan; encouraged to ask questions and participate  Time Spent for Care: 45 minutes  More than 50% of total time spent on counseling and coordination of care as described above  Current Length of Stay: 15 day(s)    Current Patient Status: Inpatient   Certification Statement: The patient will continue to require additional inpatient hospital stay due to Hematuria    Discharge Plan: To be determined, likely 24 to 48 hours    Code Status: Level 3 - DNAR and DNI      Subjective:   Patient seen and examined bedside, no acute distress or discomfort noted  Fortunately, hemoglobin remained stable today at 7 7, monitor and transfuse as needed  As per family, seeking placement at [facility]  Patient stable for discharge to follow on rehab when 1 becomes available  Spoke with ID provider today; have added fluconazole as, despite urine cultures likely being colonized, patient's family is insistent that he be treated with antibiotics  Continue Shirley and manual irrigation as prescribed by urology  Follow-up with oncology in the outpatient setting  Will need chemo port replaced when more stable  Objective:     Vitals:   Temp (24hrs), Av 2 °F (37 3 °C), Min:98 7 °F (37 1 °C), Max:99 6 °F (37 6 °C)    Temp:  [98 7 °F (37 1 °C)-99 6 °F (37 6 °C)] 98 7 °F (37 1 °C)  HR:  [] 96  Resp:  [18] 18  BP: (122-141)/(73-92) 122/73  SpO2:  [97 %-98 %] 98 %  Body mass index is 23 43 kg/m²  Input and Output Summary (last 24 hours):        Intake/Output Summary (Last 24 hours) at 1/22/2023 1644  Last data filed at 1/22/2023 1217  Gross per 24 hour   Intake 480 ml   Output 1950 ml   Net -1470 ml       Physical Exam:     Physical Exam  Vitals and nursing note reviewed  Constitutional:       General: He is not in acute distress  Appearance: He is well-developed  HENT:      Head: Normocephalic and atraumatic  Eyes:      Conjunctiva/sclera: Conjunctivae normal    Cardiovascular:      Rate and Rhythm: Normal rate  Heart sounds: No murmur heard  Pulmonary:      Effort: Pulmonary effort is normal  No respiratory distress  Comments: Left-sided Pleurx catheter in place  Abdominal:      Palpations: Abdomen is soft  Tenderness: There is no abdominal tenderness  Genitourinary:     Comments: Shirley catheter in place; bilateral nephrostomy tubes  Musculoskeletal:         General: No swelling  Cervical back: Neck supple  Right lower leg: Edema present  Left lower leg: Edema present  Skin:     General: Skin is warm and dry  Neurological:      Mental Status: He is alert and oriented to person, place, and time  Psychiatric:         Mood and Affect: Mood normal            Additional Data:     Labs:    Results from last 7 days   Lab Units 01/22/23  0450   WBC Thousand/uL 12 16*   HEMOGLOBIN g/dL 7 7*   HEMATOCRIT % 25 2*   PLATELETS Thousands/uL 277   NEUTROS PCT % 63   LYMPHS PCT % 18   MONOS PCT % 9   EOS PCT % 8*     Results from last 7 days   Lab Units 01/22/23  0450 01/21/23  0630 01/20/23  1311   SODIUM mmol/L 135   < > 134*   POTASSIUM mmol/L 4 6   < > 5 3   CHLORIDE mmol/L 101   < > 101   CO2 mmol/L 26   < > 26   BUN mg/dL 52*   < > 53*   CREATININE mg/dL 2 97*   < > 2 87*   ANION GAP mmol/L 8   < > 7   CALCIUM mg/dL 8 9   < > 9 0   ALBUMIN g/dL  --   --  3 1*   TOTAL BILIRUBIN mg/dL  --   --  0 21   ALK PHOS U/L  --   --  98   ALT U/L  --   --  43   AST U/L  --   --  39   GLUCOSE RANDOM mg/dL 191*   < > 166*    < > = values in this interval not displayed  Results from last 7 days   Lab Units 01/22/23  1612 01/22/23  1213 01/22/23  0747 01/21/23  2040 01/21/23  1655 01/21/23  1123 01/21/23  0749 01/20/23  2037 01/20/23  1057 01/20/23  0708 01/19/23  2101 01/19/23  1604   POC GLUCOSE mg/dl 172* 225* 266* 235* 135 137 124 321* 235* 148* 420* 241*                   * I Have Reviewed All Lab Data Listed Above  * Additional Pertinent Lab Tests Reviewed: All Labs Within Last 24 Hours Reviewed    Imaging:    Imaging Reports Reviewed Today Include:   Imaging Personally Reviewed by Myself Includes:      Recent Cultures (last 7 days):     Results from last 7 days   Lab Units 01/20/23  1133 01/19/23  1202 01/19/23  1023   BLOOD CULTURE   --  No Growth at 48 hrs  No Growth at 72 hrs     URINE CULTURE  40,000-49,000 cfu/ml Candida albicans*  --   --        Last 24 Hours Medication List:   Current Facility-Administered Medications   Medication Dose Route Frequency Provider Last Rate   • ALPRAZolam  0 25 mg Oral BID PRN Dinesh Cowart, CRNP     • ampicillin  2,000 mg Intravenous Q12H Archie Alvarado MD 2,000 mg (01/22/23 1356)   • ARIPiprazole  2 mg Oral Daily Dinesh Cowart, CRNP     • azelastine  1 spray Each Nare BID PRN Dinesh Cowart, CRNP     • bimatoprost  1 drop Both Eyes HS Dinesh Cowart, CRNP     • calcitriol  0 25 mcg Oral Daily Dinesh Cowart, CRNP     • cyanocobalamin  1,000 mcg Oral Daily Camiyceawaldemar Bensone, CRNP     • ezetimibe  10 mg Oral Daily Dinesh Cowart CRNP     • [START ON 1/23/2023] fluconazole  100 mg Oral Daily Ivanna St MD     • furosemide  20 mg Oral Daily Camiyceawaldemar Bensone, CRNP     • gabapentin  300 mg Oral HS Joyceawaldemar Bensnoe, CRNP     • HYDROmorphone  0 2 mg Intravenous 4x Daily PRN Dinesh Cowart CRNP     • hydrOXYzine HCL  25 mg Oral Q6H PRN Josh Fatima MD     • insulin glargine  18 Units Subcutaneous HS Josh Fatima MD     • melatonin  3 mg Oral HS PRN Eula Baumgarten, PA-C     • metoprolol tartrate  25 mg [facility] Q12H [facility]     • ondansetron  4 mg Intravenous Q6H PRN EVANGELINA Sears     • oxybutynin  5 mg Oral TID EVANGELINA Bueno     • oxyCODONE  10 mg Oral Q4H PRN Barbara Beard MD     • pantoprazole  20 mg Oral Early Morning EVANGELINA Sears     • polyethylene glycol  17 g Oral Daily PRN EVANGELINA Sears     • senna  2 tablet Oral BID PRN Altaf Mcclelland DO     • simethicone  80 mg Oral 4x Daily PRN EVANGELINA Sears          Today, Patient Was Seen By: Emile Diaz MD    ** Please Note: Dictation voice to text software may have been used in the creation of this document   **

## 2023-01-22 NOTE — PLAN OF CARE
Problem: PHYSICAL THERAPY ADULT  Goal: Performs mobility at highest level of function for planned discharge setting  See evaluation for individualized goals  Description: Treatment/Interventions: Functional transfer training, LE strengthening/ROM, Elevations, Therapeutic exercise, Cognitive reorientation, Patient/family training, Equipment eval/education, Bed mobility, Gait training, Compensatory technique education, Continued evaluation, Spoke to nursing, OT          See flowsheet documentation for full assessment, interventions and recommendations  Outcome: Not Progressing  Note: Prognosis: Fair  Problem List: Decreased strength, Decreased range of motion, Decreased endurance, Impaired balance, Decreased mobility, Decreased coordination, Impaired judgement, Pain, Impaired tone  Assessment: Pt  noted with slight regress in his overall mobility comapred to alst session  Pt  needed Min A x 1 for STS transfer from elevated surface and Mod A x 2 for STS from lower surface  Pt  able to perform LE Sahil in supine position  Pt  given Min A x 2 for second ambulation as he had 1x L knee buckling and increased LE instability  Cues for hand placement given for all STS transfers  Pt  back in be dpost session with all needs within reach and alarm engaged  Will continue to follow per PT POC  Barriers to Discharge: None     PT Discharge Recommendation: Post acute rehabilitation services    See flowsheet documentation for full assessment

## 2023-01-22 NOTE — PHYSICAL THERAPY NOTE
Physical Therapy Treatment Note     01/22/23 1447   PT Last Visit   PT Visit Date 01/22/23   Note Type   Note Type Treatment   Pain Assessment   Pain Assessment Tool 0-10   Pain Score 8   Pain Location/Orientation Location: Groin   Restrictions/Precautions   Weight Bearing Precautions Per Order No   Braces or Orthoses Sling  (RUE)   Other Precautions Contact/isolation;Multiple lines; Fall Risk;Pain;Bed Alarm   General   Chart Reviewed Yes   Family/Caregiver Present No   Subjective   Subjective pt  agreeable to PT   Bed Mobility   Supine to Sit 3  Moderate assistance   Additional items Assist x 1; Increased time required;LE management; Bedrails;HOB elevated   Sit to Supine 4  Minimal assistance   Additional items Assist x 1;Bedrails; Increased time required;Verbal cues   Transfers   Sit to Stand   (Min A from elevated surface, Mod A x 2 from lower surface)   Additional items Assist x 2; Increased time required;Verbal cues; Assist x 1; Armrests   Stand to Sit 4  Minimal assistance   Additional items Assist x 1; Increased time required;Verbal cues;Armrests   Stand pivot 4  Minimal assistance   Additional items Assist x 1; Increased time required;Assist x 2;Verbal cues   Ambulation/Elevation   Gait pattern Improper Weight shift; Forward Flexion;Decreased foot clearance; Inconsistent eliecer; Foward flexed; Short stride; Excessively slow;Narrow ROSALIA; Decreased heel strike;Decreased toe off   Gait Assistance 4  Minimal assist   Additional items Assist x 1;Assist x 2;Verbal cues   Assistive Device Rolling walker   Distance 14ft x 2   Balance   Static Sitting Fair +   Dynamic Sitting Fair   Static Standing Fair -   Dynamic Standing Poor +   Ambulatory Poor +   Endurance Deficit   Endurance Deficit Yes   Endurance Deficit Description fatigue   Activity Tolerance   Activity Tolerance Patient tolerated treatment well   Nurse Made Aware yes   Exercises   THR Supine;Sitting;Bilateral;AROM;20 reps   Assessment   Prognosis Fair   Problem List Decreased strength;Decreased range of motion;Decreased endurance; Impaired balance;Decreased mobility; Decreased coordination; Impaired judgement;Pain; Impaired tone   Assessment Pt  noted with slight regress in his overall mobility comapred to alst session  Pt  needed Min A x 1 for STS transfer from elevated surface and Mod A x 2 for STS from lower surface  Pt  able to perform LE Sahil in supine position  Pt  given Min A x 2 for second ambulation as he had 1x L knee buckling and increased LE instability  Cues for hand placement given for all STS transfers  Pt  back in be dpost session with all needs within reach and alarm engaged  Will continue to follow per PT POC   Barriers to Discharge None   Goals   Patient Goals None reported   STG Expiration Date 01/23/23   PT Treatment Day 3   Plan   Treatment/Interventions LE strengthening/ROM; Functional transfer training; Therapeutic exercise;Patient/family training;Bed mobility;Gait training;Spoke to nursing;Equipment eval/education   Progress Slow progress, decreased activity tolerance   PT Frequency 3-5x/wk   Recommendation   PT Discharge Recommendation Post acute rehabilitation services   AM-PAC Basic Mobility Inpatient   Turning in Flat Bed Without Bedrails 2   Lying on Back to Sitting on Edge of Flat Bed Without Bedrails 2   Moving Bed to Chair 2   Standing Up From Chair Using Arms 2   Walk in Room 3   Climb 3-5 Stairs With Railing 2   Basic Mobility Inpatient Raw Score 13   Basic Mobility Standardized Score 33 99   Highest Level Of Mobility   JH-HLM Goal 4: Move to chair/commode   JH-HLM Achieved 6: Walk 10 steps or more           Mary Jane Paris PTA    An AM-PAC basic mobility standardized score less than 42 9 suggest the patient may benefit from discharge to post-acute rehab services

## 2023-01-22 NOTE — ASSESSMENT & PLAN NOTE
· Patient has persistent hematuria due to bladder cancer  History of recurrent UTI on suppressive Bactrim  · CT A/P: Stable bladder wall thickening, compatible with known malignancy  Increased hyperdensity within the bladder lumen, compatible with new blood clot  · Recent admission at [facility] 12/12-12/22 for hematuria and obstructive uropathy in the setting of known bladder cancer s/p BCG and subsequent chemotherapy/radiation s/p bilateral chronic PCN tubes   Noted to have new right-sided hydronephrosis on CT s/p cystoscopy, clot evacuation w/ right ureteral stenting on 12/14 by Dr Patricia Redding  · UA bloody, innumerable RBC/WBC, +leuks, no nitrites    Patient initially was on CBI per urology  Now transition to hand irrigation several times per day  Urology does not feel patient needs anything other than this right now    He unfortunately is likely to have chronic hematuria

## 2023-01-23 LAB
ABO GROUP BLD: NORMAL
ALBUMIN SERPL BCP-MCNC: 2.9 G/DL (ref 3.5–5)
ALP SERPL-CCNC: 88 U/L (ref 34–104)
ALT SERPL W P-5'-P-CCNC: 32 U/L (ref 7–52)
ANION GAP SERPL CALCULATED.3IONS-SCNC: 8 MMOL/L (ref 4–13)
AST SERPL W P-5'-P-CCNC: 23 U/L (ref 13–39)
BASOPHILS # BLD AUTO: 0.07 THOUSANDS/ÂΜL (ref 0–0.1)
BASOPHILS NFR BLD AUTO: 1 % (ref 0–1)
BILIRUB SERPL-MCNC: 0.32 MG/DL (ref 0.2–1)
BLD GP AB SCN SERPL QL: NEGATIVE
BUN SERPL-MCNC: 54 MG/DL (ref 5–25)
CALCIUM ALBUM COR SERPL-MCNC: 9.8 MG/DL (ref 8.3–10.1)
CALCIUM SERPL-MCNC: 8.9 MG/DL (ref 8.4–10.2)
CHLORIDE SERPL-SCNC: 103 MMOL/L (ref 96–108)
CO2 SERPL-SCNC: 26 MMOL/L (ref 21–32)
CREAT SERPL-MCNC: 2.99 MG/DL (ref 0.6–1.3)
EOSINOPHIL # BLD AUTO: 0.9 THOUSAND/ÂΜL (ref 0–0.61)
EOSINOPHIL NFR BLD AUTO: 7 % (ref 0–6)
ERYTHROCYTE [DISTWIDTH] IN BLOOD BY AUTOMATED COUNT: 16.9 % (ref 11.6–15.1)
GFR SERPL CREATININE-BSD FRML MDRD: 18 ML/MIN/1.73SQ M
GLUCOSE SERPL-MCNC: 144 MG/DL (ref 65–140)
GLUCOSE SERPL-MCNC: 208 MG/DL (ref 65–140)
GLUCOSE SERPL-MCNC: 349 MG/DL (ref 65–140)
GLUCOSE SERPL-MCNC: 86 MG/DL (ref 65–140)
GLUCOSE SERPL-MCNC: 96 MG/DL (ref 65–140)
HCT VFR BLD AUTO: 23.5 % (ref 36.5–49.3)
HGB BLD-MCNC: 7.4 G/DL (ref 12–17)
IMM GRANULOCYTES # BLD AUTO: 0.08 THOUSAND/UL (ref 0–0.2)
IMM GRANULOCYTES NFR BLD AUTO: 1 % (ref 0–2)
LYMPHOCYTES # BLD AUTO: 2.33 THOUSANDS/ÂΜL (ref 0.6–4.47)
LYMPHOCYTES NFR BLD AUTO: 18 % (ref 14–44)
MAGNESIUM SERPL-MCNC: 2 MG/DL (ref 1.9–2.7)
MCH RBC QN AUTO: 29.2 PG (ref 26.8–34.3)
MCHC RBC AUTO-ENTMCNC: 31.5 G/DL (ref 31.4–37.4)
MCV RBC AUTO: 93 FL (ref 82–98)
MONOCYTES # BLD AUTO: 1.08 THOUSAND/ÂΜL (ref 0.17–1.22)
MONOCYTES NFR BLD AUTO: 9 % (ref 4–12)
NEUTROPHILS # BLD AUTO: 8.17 THOUSANDS/ÂΜL (ref 1.85–7.62)
NEUTS SEG NFR BLD AUTO: 64 % (ref 43–75)
NRBC BLD AUTO-RTO: 0 /100 WBCS
PHOSPHATE SERPL-MCNC: 4.6 MG/DL (ref 2.3–4.1)
PLATELET # BLD AUTO: 260 THOUSANDS/UL (ref 149–390)
PMV BLD AUTO: 9.3 FL (ref 8.9–12.7)
POTASSIUM SERPL-SCNC: 4.7 MMOL/L (ref 3.5–5.3)
PROT SERPL-MCNC: 6.5 G/DL (ref 6.4–8.4)
RBC # BLD AUTO: 2.53 MILLION/UL (ref 3.88–5.62)
RH BLD: POSITIVE
SARS-COV-2 RNA RESP QL NAA+PROBE: NEGATIVE
SODIUM SERPL-SCNC: 137 MMOL/L (ref 135–147)
SPECIMEN EXPIRATION DATE: NORMAL
WBC # BLD AUTO: 12.63 THOUSAND/UL (ref 4.31–10.16)

## 2023-01-23 RX ORDER — HYDROMORPHONE HCL/PF 1 MG/ML
0.5 SYRINGE (ML) INJECTION ONCE
Status: DISCONTINUED | OUTPATIENT
Start: 2023-01-24 | End: 2023-01-24 | Stop reason: HOSPADM

## 2023-01-23 RX ORDER — AMOXICILLIN 500 MG/1
500 CAPSULE ORAL EVERY 12 HOURS SCHEDULED
Status: DISCONTINUED | OUTPATIENT
Start: 2023-01-23 | End: 2023-01-24 | Stop reason: HOSPADM

## 2023-01-23 RX ORDER — FLUCONAZOLE 100 MG/1
200 TABLET ORAL DAILY
Status: DISCONTINUED | OUTPATIENT
Start: 2023-01-23 | End: 2023-01-23

## 2023-01-23 RX ORDER — FLUCONAZOLE 100 MG/1
100 TABLET ORAL DAILY
Status: DISCONTINUED | OUTPATIENT
Start: 2023-01-23 | End: 2023-01-24 | Stop reason: HOSPADM

## 2023-01-23 RX ADMIN — METOPROLOL TARTRATE 25 MG: 25 TABLET, FILM COATED ORAL at 09:01

## 2023-01-23 RX ADMIN — ARIPIPRAZOLE 2 MG: 2 TABLET ORAL at 09:01

## 2023-01-23 RX ADMIN — PANTOPRAZOLE SODIUM 20 MG: 20 TABLET, DELAYED RELEASE ORAL at 06:18

## 2023-01-23 RX ADMIN — OXYCODONE HYDROCHLORIDE 10 MG: 10 TABLET ORAL at 23:11

## 2023-01-23 RX ADMIN — OXYBUTYNIN CHLORIDE 5 MG: 5 TABLET ORAL at 09:01

## 2023-01-23 RX ADMIN — FUROSEMIDE 20 MG: 20 TABLET ORAL at 09:01

## 2023-01-23 RX ADMIN — CALCITRIOL 0.25 MCG: 0.25 CAPSULE, LIQUID FILLED ORAL at 09:01

## 2023-01-23 RX ADMIN — HYDROMORPHONE HYDROCHLORIDE 0.2 MG: 0.2 INJECTION, SOLUTION INTRAMUSCULAR; INTRAVENOUS; SUBCUTANEOUS at 19:58

## 2023-01-23 RX ADMIN — AMPICILLIN SODIUM 2000 MG: 2 INJECTION, POWDER, FOR SOLUTION INTRAMUSCULAR; INTRAVENOUS at 02:13

## 2023-01-23 RX ADMIN — OXYCODONE HYDROCHLORIDE 10 MG: 10 TABLET ORAL at 13:24

## 2023-01-23 RX ADMIN — METOPROLOL TARTRATE 25 MG: 25 TABLET, FILM COATED ORAL at 23:11

## 2023-01-23 RX ADMIN — BIMATOPROST 1 DROP: 0.1 SOLUTION/ DROPS OPHTHALMIC at 23:10

## 2023-01-23 RX ADMIN — OXYBUTYNIN CHLORIDE 5 MG: 5 TABLET ORAL at 15:50

## 2023-01-23 RX ADMIN — OXYCODONE HYDROCHLORIDE 10 MG: 10 TABLET ORAL at 17:25

## 2023-01-23 RX ADMIN — AMOXICILLIN 500 MG: 500 CAPSULE ORAL at 23:10

## 2023-01-23 RX ADMIN — CYANOCOBALAMIN TAB 500 MCG 1000 MCG: 500 TAB at 09:01

## 2023-01-23 RX ADMIN — OXYBUTYNIN CHLORIDE 5 MG: 5 TABLET ORAL at 23:10

## 2023-01-23 RX ADMIN — FLUCONAZOLE 100 MG: 100 TABLET ORAL at 09:01

## 2023-01-23 RX ADMIN — EZETIMIBE 10 MG: 10 TABLET ORAL at 09:01

## 2023-01-23 RX ADMIN — AMPICILLIN SODIUM 2000 MG: 2 INJECTION, POWDER, FOR SOLUTION INTRAMUSCULAR; INTRAVENOUS at 13:27

## 2023-01-23 RX ADMIN — GABAPENTIN 300 MG: 300 CAPSULE ORAL at 23:10

## 2023-01-23 RX ADMIN — INSULIN GLARGINE 18 UNITS: 100 INJECTION, SOLUTION SUBCUTANEOUS at 23:11

## 2023-01-23 NOTE — CASE MANAGEMENT
Case Management Discharge Planning Note    Patient name Eloy Green  Location [facility] /E5 [address]-* MRN [id number]  : 1945 Date 2023       Current Admission Date: 2023  Current Admission Diagnosis:Bacteremia due to Enterobacter species   Patient Active Problem List    Diagnosis Date Noted   • Severe protein-calorie malnutrition ([facility] ) 2023   • History of pleural effusion 2023   • Nephrostomy status ([facility] ) 2023   • Acute on chronic blood loss anemia 2023   • Bacteremia due to Enterobacter species 2023   • Elevated brain natriuretic peptide (BNP) level 2023   • Ambulatory dysfunction 2023   • Influenza A 2023   • Elevated troponin 2023   • Right sided weakness 2022   • Stroke-like symptoms 2022   • Symptomatic hypotension 2022   • Insomnia 2022   • Right wrist drop 2022   • Chronic pleural effusion 2022   • UTI (urinary tract infection) 2022   • SOB (shortness of breath) 2022   • History of tobacco use disorder 2022   • Retroperitoneal lymphadenopathy 2022   • Pulmonary nodules 2022   • Syncope and collapse 2022   • Depression with suicidal ideation 2022   • Cancer related pain 2022   • Bilateral hydronephrosis 04/10/2022   • Stage 4 chronic kidney disease ([facility] ) 2022   • Fall 2022   • Hyperkalemia 2022   • Retained ureteral stent    • 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[facility] ) 2022   • Embolism and 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 ([facility] ) 2022   • Abnormal CT scan, bladder 2022   • Port-A-Cath in place 2022   • Continuous opioid dependence ([facility] ) 2021   • Hematuria 10/24/2021   • Acute urinary retention 10/21/2021   • Chronic pain syndrome 2021   • Lumbar spondylosis    • Chronic bronchitis ([facility] ) 2021   • Moderate major depression, single episode ([facility] ) 2021   • Thrombocytopenia ([facility] ) 03/02/2021   • 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 03/02/2021   • Gross hematuria 02/09/2021   • PAD (peripheral artery disease) ([facility])    • Left carotid bruit    • Anemia of chronic disease 12/19/2020   • Preoperative clearance 12/19/2020   • Symptomatic anemia 10/10/2020   • Diabetic ulcer of left foot associated with type 2 diabetes mellitus, 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 ([facility] ) 10/08/2020   • Acute kidney injury superimposed on CKD ([facility] )    • Dysuria 08/17/2020   • Diabetic polyneuropathy associated with type 2 diabetes mellitus ([facility] ) 07/16/2020   • Abnormal MRI, lumbar spine 06/29/2020   • Malignant neoplasm of anterior wall of urinary bladder ([facility] )    • Secondary renal hyperparathyroidism ([facility] ) 02/12/2020   • Preop examination 04/16/2019   • Superficial phlebitis 03/07/2019   • Arteriosclerosis of artery of extremity ([facility] ) 02/22/2019   • Stable angina pectoris ([facility] ) 02/22/2019   • Herpes zoster without complication 58/36/3499   • Localized edema 02/22/2019   • Back pain 01/31/2019   • Degeneration of lumbar intervertebral disc 12/03/2018   • Primary osteoarthritis of left knee 03/16/2018   • Bladder carcinoma ([facility] ) 08/16/2016   • Presence of stent in coronary artery 08/16/2016   • Hypertension with renal disease 10/29/2015   • Hyperlipidemia 10/29/2015   • Cystitis due to intravesical BCG administration 09/24/2015   • Coronary artery disease of native artery of native heart with stable angina pectoris ([facility] ) 05/19/2011   • Type 2 diabetes mellitus, with long-term current use of insulin ([facility] ) 01/09/2004      LOS (days): 16  Geometric Mean LOS (GMLOS) (days): 5 10  Days to GMLOS:-10 5     OBJECTIVE:  Risk of Unplanned Readmission Score: 69 91         Current admission status: Inpatient   Preferred Pharmacy:   [address] [place] [address]   [address] [place] [address] Road [address] [place] [address] [place]  Phone: [phone] Fax: [place] [address], [place] - [address] [facility]  [address] [address]  Phone: [phone] Fax: [phone]    Primary Care Provider: Estuardo Rowe MD    Primary Insurance: MEDICARE  Secondary Insurance: BLUE CROSS    DISCHARGE DETAILS:       Additional Comments: Patient is accepted to [facility] rehab (per [facility] liaison Mark Borja) and can take patient tomorrow if medically clear for discharge  CM will follow up with patient's family  CM and MD left voicemail for patient's daughter for medical update & discharge planning  [facility]  CM met with patient at bedside to discuss the discharge plan  Patient states he is also waiting to hear back from [place]

## 2023-01-23 NOTE — RESTORATIVE TECHNICIAN NOTE
Restorative Technician Note      Patient Name: Aliyah Hernandez     Restorative Tech Visit Date: 01/23/23  Note Type: Mobility  Patient Position Upon Consult: Bedside chair  Activity Performed: Ambulated; Other (Comment) (In room)  Patient Position at End of Consult: Bedside chair;  All needs within reach

## 2023-01-23 NOTE — PLAN OF CARE
Problem: Nutrition/Hydration-ADULT  Goal: Nutrient/Hydration intake appropriate for improving, restoring or maintaining nutritional needs  Description: Monitor and assess patient's nutrition/hydration status for malnutrition  Collaborate with interdisciplinary team and initiate plan and interventions as ordered  Monitor patient's weight and dietary intake as ordered or per policy  Utilize nutrition screening tool and intervene as necessary  Determine patient's food preferences and provide high-protein, high-caloric foods as appropriate       INTERVENTIONS:  - Monitor oral intake, urinary output, labs, and treatment plans  - Assess nutrition and hydration status and recommend course of action  - Evaluate amount of meals eaten  - Assist patient with eating if necessary   - Allow adequate time for meals  - Recommend/ encourage appropriate diets, oral nutritional supplements, and vitamin/mineral supplements  - Order, calculate, and assess calorie counts as needed  - Recommend, monitor, and adjust tube feedings and TPN/PPN based on assessed needs  - Assess need for intravenous fluids  - Provide specific nutrition/hydration education as appropriate  - Include patient/family/caregiver in decisions related to nutrition  Outcome: Progressing     Problem: Prexisting or High Potential for Compromised Skin Integrity  Goal: Skin integrity is maintained or improved  Description: INTERVENTIONS:  - Identify patients at risk for skin breakdown  - Assess and monitor skin integrity  - Assess and monitor nutrition and hydration status  - Monitor labs   - Assess for incontinence   - Turn and reposition patient  - Assist with mobility/ambulation  - Relieve pressure over bony prominences  - Avoid friction and shearing  - Provide appropriate hygiene as needed including keeping skin clean and dry  - Evaluate need for skin moisturizer/barrier cream  - Collaborate with interdisciplinary team   - Patient/family teaching  - Consider wound care consult   Outcome: Progressing     Problem: MOBILITY - ADULT  Goal: Maintain or return to baseline ADL function  Description: INTERVENTIONS:  -  Assess patient's ability to carry out ADLs; assess patient's baseline for ADL function and identify physical deficits which impact ability to perform ADLs (bathing, care of mouth/teeth, toileting, grooming, dressing, etc )  - Assess/evaluate cause of self-care deficits   - Assess range of motion  - Assess patient's mobility; develop plan if impaired  - Assess patient's need for assistive devices and provide as appropriate  - Encourage maximum independence but intervene and supervise when necessary  - Involve family in performance of ADLs  - Assess for home care needs following discharge   - Consider OT consult to assist with ADL evaluation and planning for discharge  - Provide patient education as appropriate  Outcome: Progressing  Goal: Maintains/Returns to pre admission functional level  Description: INTERVENTIONS:  - Perform BMAT or MOVE assessment daily    - Set and communicate daily mobility goal to care team and patient/family/caregiver  - Collaborate with rehabilitation services on mobility goals if consulted  - Perform Range of Motion 3 times a day  - Reposition patient every 2 hours    - Dangle patient 3 times a day  - Stand patient 3 times a day  - Ambulate patient 3 times a day  - Out of bed to chair 3 times a day   - Out of bed for meals 3 times a day  - Out of bed for toileting  - Record patient progress and toleration of activity level   Outcome: Progressing     Problem: Potential for Falls  Goal: Patient will remain free of falls  Description: INTERVENTIONS:  - Educate patient/family on patient safety including physical limitations  - Instruct patient to call for assistance with activity   - Consult OT/PT to assist with strengthening/mobility   - Keep Call bell within reach  - Keep bed low and locked with side rails adjusted as appropriate  - Keep care items and personal belongings within reach  - Initiate and maintain comfort rounds  - Make Fall Risk Sign visible to staff  - Offer Toileting every 2 Hours, in advance of need  - Initiate/Maintain bed alarm  - Obtain necessary fall risk management equipment: Alarm  - Apply yellow socks and bracelet for high fall risk patients  - Consider moving patient to room near nurses station  Outcome: Progressing     Problem: PAIN - ADULT  Goal: Verbalizes/displays adequate comfort level or baseline comfort level  Description: Interventions:  - Encourage patient to monitor pain and request assistance  - Assess pain using appropriate pain scale  - Administer analgesics based on type and severity of pain and evaluate response  - Implement non-pharmacological measures as appropriate and evaluate response  - Consider cultural and social influences on pain and pain management  - Notify physician/advanced practitioner if interventions unsuccessful or patient reports new pain  Outcome: Progressing     Problem: INFECTION - ADULT  Goal: Absence or prevention of progression during hospitalization  Description: INTERVENTIONS:  - Assess and monitor for signs and symptoms of infection  - Monitor lab/diagnostic results  - Monitor all insertion sites, i e  indwelling lines, tubes, and drains  - Monitor endotracheal if appropriate and nasal secretions for changes in amount and color  - [place] appropriate cooling/warming therapies per order  - Administer medications as ordered  - Instruct and encourage patient and family to use good hand hygiene technique  - Identify and instruct in appropriate isolation precautions for identified infection/condition  Outcome: Progressing  Goal: Absence of fever/infection during neutropenic period  Description: INTERVENTIONS:  - Monitor WBC    Outcome: Progressing     Problem: SAFETY ADULT  Goal: Maintain or return to baseline ADL function  Description: INTERVENTIONS:  -  Assess patient's ability to carry out ADLs; assess patient's baseline for ADL function and identify physical deficits which impact ability to perform ADLs (bathing, care of mouth/teeth, toileting, grooming, dressing, etc )  - Assess/evaluate cause of self-care deficits   - Assess range of motion  - Assess patient's mobility; develop plan if impaired  - Assess patient's need for assistive devices and provide as appropriate  - Encourage maximum independence but intervene and supervise when necessary  - Involve family in performance of ADLs  - Assess for home care needs following discharge   - Consider OT consult to assist with ADL evaluation and planning for discharge  - Provide patient education as appropriate  Outcome: Progressing  Goal: Maintains/Returns to pre admission functional level  Description: INTERVENTIONS:  - Perform BMAT or MOVE assessment daily    - Set and communicate daily mobility goal to care team and patient/family/caregiver  - Collaborate with rehabilitation services on mobility goals if consulted  - Perform Range of Motion 3 times a day  - Reposition patient every 2 hours    - Dangle patient 3 times a day  - Stand patient 3 times a day  - Ambulate patient 3 times a day  - Out of bed to chair 3 times a day   - Out of bed for meals 3 times a day  - Out of bed for toileting  - Record patient progress and toleration of activity level   Outcome: Progressing  Goal: Patient will remain free of falls  Description: INTERVENTIONS:  - Educate patient/family on patient safety including physical limitations  - Instruct patient to call for assistance with activity   - Consult OT/PT to assist with strengthening/mobility   - Keep Call bell within reach  - Keep bed low and locked with side rails adjusted as appropriate  - Keep care items and personal belongings within reach  - Initiate and maintain comfort rounds  - Make Fall Risk Sign visible to staff  - Offer Toileting every 2 Hours, in advance of need  - Initiate/Maintain bed alarm  - Obtain necessary fall risk management equipment: Alarm  - Apply yellow socks and bracelet for high fall risk patients  - Consider moving patient to room near nurses station  Outcome: Progressing     Problem: GENITOURINARY - ADULT  Goal: Maintains or returns to baseline urinary function  Description: INTERVENTIONS:  - Assess urinary function  - Encourage oral fluids to ensure adequate hydration if ordered  - Administer IV fluids as ordered to ensure adequate hydration  - Administer ordered medications as needed  - Offer frequent toileting  - Follow urinary retention protocol if ordered  Outcome: Progressing  Goal: Absence of urinary retention  Description: INTERVENTIONS:  - Assess patient’s ability to void and empty bladder  - Monitor I/O  - Bladder scan as needed  - Discuss with physician/AP medications to alleviate retention as needed  - Discuss catheterization for long term situations as appropriate  Outcome: Progressing  Goal: Urinary catheter remains patent  Description: INTERVENTIONS:  - Assess patency of urinary catheter  - If patient has a chronic wagner, consider changing catheter if non-functioning  - Follow guidelines for intermittent irrigation of non-functioning urinary catheter  Outcome: Progressing     Problem: HEMATOLOGIC - ADULT  Goal: Maintains hematologic stability  Description: INTERVENTIONS  - Assess for signs and symptoms of bleeding or hemorrhage  - Monitor labs  - Administer supportive blood products/factors as ordered and appropriate  Outcome: Progressing     Problem: MUSCULOSKELETAL - ADULT  Goal: Maintain or return mobility to safest level of function  Description: INTERVENTIONS:  - Assess patient's ability to carry out ADLs; assess patient's baseline for ADL function and identify physical deficits which impact ability to perform ADLs (bathing, care of mouth/teeth, toileting, grooming, dressing, etc )  - Assess/evaluate cause of self-care deficits   - Assess range of motion  - Assess patient's mobility  - Assess patient's need for assistive devices and provide as appropriate  - Encourage maximum independence but intervene and supervise when necessary  - Involve family in performance of ADLs  - Assess for home care needs following discharge   - Consider OT consult to assist with ADL evaluation and planning for discharge  - Provide patient education as appropriate  Outcome: Progressing  Goal: Maintain proper alignment of affected body part  Description: INTERVENTIONS:  - Support, maintain and protect limb and body alignment  - Provide patient/ family with appropriate education  Outcome: Progressing

## 2023-01-23 NOTE — PROGRESS NOTES
Progress Note - Silver Keane 76 y o  male MRN: [id number]    Unit/Bed#: E5 -01 Encounter: [id number]    Assessment/Plan:    Acute/chronic blood loss  sequela of hematuria due to bladder cancer, hemoglobin decreased 7 4, will transfuse 1 unit today    CKD 4     creatinine stable 2 99    Nephrostomy status   bilateral nephrostomy tubes chronic    Chronic pleural effusion  stable left pleural effusion with drainage catheter    Urinary bladder neoplasm  currently stage IV metastatic bladder cancer continue oncology follow-up     Diabetes    a m  glucose 86 continue Lantus with ADA diet and sliding scale    Bacteremia    Enterobacter, repeat blood cultures no growth, continue IV ampicillin discussed with ID possible transition to p o  amoxicillin    Severe protein calorie malnutrition encourage caloric Intake    Subjective:   Feels weak, denies chest pain or shortness of breath resting, no nausea vomiting no fevers chills       Objective:     Vitals: Blood pressure 118/57, pulse 100, temperature 98 9 °F (37 2 °C), resp  rate 16, height 6' 4" (1 93 m), weight 87 kg (191 lb 12 8 oz), SpO2 98 %  ,Body mass index is 23 35 kg/m²        Results from last 7 days   Lab Units 01/23/23  0622   WBC Thousand/uL 12 63*   HEMOGLOBIN g/dL 7 4*   HEMATOCRIT % 23 5*   PLATELETS Thousands/uL 260     Results from last 7 days   Lab Units 01/23/23  0622   POTASSIUM mmol/L 4 7   CHLORIDE mmol/L 103   CO2 mmol/L 26   BUN mg/dL 54*   CREATININE mg/dL 2 99*   CALCIUM mg/dL 8 9   ALK PHOS U/L 88   ALT U/L 32   AST U/L 23       Scheduled Meds:  Current Facility-Administered Medications   Medication Dose Route Frequency Provider Last Rate   • ALPRAZolam  0 25 mg Oral BID PRN EVANGELINA Ruano     • ampicillin  2,000 mg Intravenous Q12H Isela Oro MD 2,000 mg (01/23/23 5866)   • ARIPiprazole  2 mg Oral Daily EVANGELINA Ruano     • azelastine  1 spray Each Nare BID PRN EVANGELINA Ruano     • bimatoprost  1 drop Both Eyes HS EVANGELINA Cesar     • calcitriol  0 25 mcg Oral Daily EVANGELINA Cesar     • cyanocobalamin  1,000 mcg Oral Daily EVANGELINA Cesar     • ezetimibe  10 mg Oral Daily EVANGELINA Cesar     • fluconazole  100 mg Oral Daily Frances Chavez MD     • furosemide  20 mg Oral Daily EVANGELINA Cesar     • gabapentin  300 mg Oral HS EVANGELINA Cesar     • HYDROmorphone  0 2 mg Intravenous 4x Daily PRN EVANGELINA Cesar     • hydrOXYzine HCL  25 mg Oral Q6H PRN Sergey Yeung MD     • insulin glargine  18 Units Subcutaneous HS Sergey Yeung MD     • melatonin  3 mg Oral HS PRN Yue Lyons PA-C     • metoprolol tartrate  25 mg Oral Q12H [facility], CRNP     • ondansetron  4 mg Intravenous Q6H PRN EVANGELINA Cesar     • oxybutynin  5 mg Oral TID EVANGELINA Mathias     • oxyCODONE  10 mg Oral Q4H PRN Sergey Yeung MD     • pantoprazole  20 mg Oral Early Morning EVANGELINA Cesar     • polyethylene glycol  17 g Oral Daily PRN EVANGELINA Cesar     • senna  2 tablet Oral BID PRN Mike Mcclelland DO     • simethicone  80 mg Oral 4x Daily PRN EVANGELINA Cesar         Continuous Infusions:         Physical exam:  General appearance: Alert no distress interaction appropriate frail and elderly  Head/Eyes: Nonicteric pale EOMI  Neck: Supple  Lungs: Decreased BS bilateral no wheezing rhonchi or rales left Pleurx  Heart: normal S1 S2 regular  Abdomen: Soft nontender with bowel sounds Shirley catheter/hematuria  Extremities: Bilateral chronic edema  Skin: no rash    Invasive Devices     Peripheral Intravenous Line  Duration           Peripheral IV 01/21/23 Dorsal (posterior); Left Hand 2 days          Drain  Duration           Urethral Catheter Three way 20 Fr  15 days    Nephrostomy Left 10 2 Fr  12 days    Nephrostomy Right 10 2 Fr  12 days                    Counseling / Coordination of Care  Total floor / unit time spent today 30  minutes  Greater than 50% of total time was spent with the patient and / or family counseling and / or coordination of care  A description of the counseling / coordination of care: Frutoso Spatz

## 2023-01-23 NOTE — PLAN OF CARE
Problem: Nutrition/Hydration-ADULT  Goal: Nutrient/Hydration intake appropriate for improving, restoring or maintaining nutritional needs  Description: Monitor and assess patient's nutrition/hydration status for malnutrition  Collaborate with interdisciplinary team and initiate plan and interventions as ordered  Monitor patient's weight and dietary intake as ordered or per policy  Utilize nutrition screening tool and intervene as necessary  Determine patient's food preferences and provide high-protein, high-caloric foods as appropriate       INTERVENTIONS:  - Monitor oral intake, urinary output, labs, and treatment plans  - Assess nutrition and hydration status and recommend course of action  - Evaluate amount of meals eaten  - Assist patient with eating if necessary   - Allow adequate time for meals  - Recommend/ encourage appropriate diets, oral nutritional supplements, and vitamin/mineral supplements  - Order, calculate, and assess calorie counts as needed  - Recommend, monitor, and adjust tube feedings and TPN/PPN based on assessed needs  - Assess need for intravenous fluids  - Provide specific nutrition/hydration education as appropriate  - Include patient/family/caregiver in decisions related to nutrition  Outcome: Progressing     Problem: Prexisting or High Potential for Compromised Skin Integrity  Goal: Skin integrity is maintained or improved  Description: INTERVENTIONS:  - Identify patients at risk for skin breakdown  - Assess and monitor skin integrity  - Assess and monitor nutrition and hydration status  - Monitor labs   - Assess for incontinence   - Turn and reposition patient  - Assist with mobility/ambulation  - Relieve pressure over bony prominences  - Avoid friction and shearing  - Provide appropriate hygiene as needed including keeping skin clean and dry  - Evaluate need for skin moisturizer/barrier cream  - Collaborate with interdisciplinary team   - Patient/family teaching  - Consider wound care consult   Outcome: Progressing     Problem: MOBILITY - ADULT  Goal: Maintain or return to baseline ADL function  Description: INTERVENTIONS:  -  Assess patient's ability to carry out ADLs; assess patient's baseline for ADL function and identify physical deficits which impact ability to perform ADLs (bathing, care of mouth/teeth, toileting, grooming, dressing, etc )  - Assess/evaluate cause of self-care deficits   - Assess range of motion  - Assess patient's mobility; develop plan if impaired  - Assess patient's need for assistive devices and provide as appropriate  - Encourage maximum independence but intervene and supervise when necessary  - Involve family in performance of ADLs  - Assess for home care needs following discharge   - Consider OT consult to assist with ADL evaluation and planning for discharge  - Provide patient education as appropriate  Outcome: Progressing  Goal: Maintains/Returns to pre admission functional level  Description: INTERVENTIONS:  - Perform BMAT or MOVE assessment daily    - Set and communicate daily mobility goal to care team and patient/family/caregiver  - Collaborate with rehabilitation services on mobility goals if consulted  - Perform Range of Motion 3 times a day  - Reposition patient every 2 hours    - Dangle patient 3 times a day  - Stand patient 3 times a day  - Ambulate patient 3 times a day  - Out of bed to chair 3 times a day   - Out of bed for meals 3 times a day  - Out of bed for toileting  - Record patient progress and toleration of activity level   Outcome: Progressing     Problem: Potential for Falls  Goal: Patient will remain free of falls  Description: INTERVENTIONS:  - Educate patient/family on patient safety including physical limitations  - Instruct patient to call for assistance with activity   - Consult OT/PT to assist with strengthening/mobility   - Keep Call bell within reach  - Keep bed low and locked with side rails adjusted as appropriate  - Keep care items and personal belongings within reach  - Initiate and maintain comfort rounds  - Make Fall Risk Sign visible to staff  - Offer Toileting every 2 Hours, in advance of need  - Initiate/Maintain bed alarm  - Obtain necessary fall risk management equipment: Alarm  - Apply yellow socks and bracelet for high fall risk patients  - Consider moving patient to room near nurses station  Outcome: Progressing     Problem: PAIN - ADULT  Goal: Verbalizes/displays adequate comfort level or baseline comfort level  Description: Interventions:  - Encourage patient to monitor pain and request assistance  - Assess pain using appropriate pain scale  - Administer analgesics based on type and severity of pain and evaluate response  - Implement non-pharmacological measures as appropriate and evaluate response  - Consider cultural and social influences on pain and pain management  - Notify physician/advanced practitioner if interventions unsuccessful or patient reports new pain  Outcome: Progressing     Problem: INFECTION - ADULT  Goal: Absence or prevention of progression during hospitalization  Description: INTERVENTIONS:  - Assess and monitor for signs and symptoms of infection  - Monitor lab/diagnostic results  - Monitor all insertion sites, i e  indwelling lines, tubes, and drains  - Monitor endotracheal if appropriate and nasal secretions for changes in amount and color  - [place] appropriate cooling/warming therapies per order  - Administer medications as ordered  - Instruct and encourage patient and family to use good hand hygiene technique  - Identify and instruct in appropriate isolation precautions for identified infection/condition  Outcome: Progressing  Goal: Absence of fever/infection during neutropenic period  Description: INTERVENTIONS:  - Monitor WBC    Outcome: Progressing     Problem: SAFETY ADULT  Goal: Maintain or return to baseline ADL function  Description: INTERVENTIONS:  -  Assess patient's ability to carry out ADLs; assess patient's baseline for ADL function and identify physical deficits which impact ability to perform ADLs (bathing, care of mouth/teeth, toileting, grooming, dressing, etc )  - Assess/evaluate cause of self-care deficits   - Assess range of motion  - Assess patient's mobility; develop plan if impaired  - Assess patient's need for assistive devices and provide as appropriate  - Encourage maximum independence but intervene and supervise when necessary  - Involve family in performance of ADLs  - Assess for home care needs following discharge   - Consider OT consult to assist with ADL evaluation and planning for discharge  - Provide patient education as appropriate  Outcome: Progressing  Goal: Maintains/Returns to pre admission functional level  Description: INTERVENTIONS:  - Perform BMAT or MOVE assessment daily    - Set and communicate daily mobility goal to care team and patient/family/caregiver  - Collaborate with rehabilitation services on mobility goals if consulted  - Perform Range of Motion 3 times a day  - Reposition patient every 2 hours    - Dangle patient 3 times a day  - Stand patient 3 times a day  - Ambulate patient 3 times a day  - Out of bed to chair 3 times a day   - Out of bed for meals 3 times a day  - Out of bed for toileting  - Record patient progress and toleration of activity level   Outcome: Progressing  Goal: Patient will remain free of falls  Description: INTERVENTIONS:  - Educate patient/family on patient safety including physical limitations  - Instruct patient to call for assistance with activity   - Consult OT/PT to assist with strengthening/mobility   - Keep Call bell within reach  - Keep bed low and locked with side rails adjusted as appropriate  - Keep care items and personal belongings within reach  - Initiate and maintain comfort rounds  - Make Fall Risk Sign visible to staff  - Offer Toileting every 2 Hours, in advance of need  - Initiate/Maintain bed alarm  - Obtain necessary fall risk management equipment: Alarm  - Apply yellow socks and bracelet for high fall risk patients  - Consider moving patient to room near nurses station  Outcome: Progressing     Problem: GENITOURINARY - ADULT  Goal: Maintains or returns to baseline urinary function  Description: INTERVENTIONS:  - Assess urinary function  - Encourage oral fluids to ensure adequate hydration if ordered  - Administer IV fluids as ordered to ensure adequate hydration  - Administer ordered medications as needed  - Offer frequent toileting  - Follow urinary retention protocol if ordered  Outcome: Progressing  Goal: Absence of urinary retention  Description: INTERVENTIONS:  - Assess patient’s ability to void and empty bladder  - Monitor I/O  - Bladder scan as needed  - Discuss with physician/AP medications to alleviate retention as needed  - Discuss catheterization for long term situations as appropriate  Outcome: Progressing  Goal: Urinary catheter remains patent  Description: INTERVENTIONS:  - Assess patency of urinary catheter  - If patient has a chronic wagner, consider changing catheter if non-functioning  - Follow guidelines for intermittent irrigation of non-functioning urinary catheter  Outcome: Progressing     Problem: HEMATOLOGIC - ADULT  Goal: Maintains hematologic stability  Description: INTERVENTIONS  - Assess for signs and symptoms of bleeding or hemorrhage  - Monitor labs  - Administer supportive blood products/factors as ordered and appropriate  Outcome: Progressing     Problem: MUSCULOSKELETAL - ADULT  Goal: Maintain or return mobility to safest level of function  Description: INTERVENTIONS:  - Assess patient's ability to carry out ADLs; assess patient's baseline for ADL function and identify physical deficits which impact ability to perform ADLs (bathing, care of mouth/teeth, toileting, grooming, dressing, etc )  - Assess/evaluate cause of self-care deficits   - Assess range of motion  - Assess patient's mobility  - Assess patient's need for assistive devices and provide as appropriate  - Encourage maximum independence but intervene and supervise when necessary  - Involve family in performance of ADLs  - Assess for home care needs following discharge   - Consider OT consult to assist with ADL evaluation and planning for discharge  - Provide patient education as appropriate  Outcome: Progressing  Goal: Maintain proper alignment of affected body part  Description: INTERVENTIONS:  - Support, maintain and protect limb and body alignment  - Provide patient/ family with appropriate education  Outcome: Progressing

## 2023-01-23 NOTE — PROGRESS NOTES
Progress Note - Infectious Disease   Lanre Morley 76 y o  male MRN: [id number]  Unit/Bed#: E5 -01 Encounter: [id number]      Impression/Plan:  1   Persistent leukocytosis   Patient recently seen by our service and treated for problem 5  [facility] continues to have mild fluctuating leukocytosis with peripheral eosinophilia   Suspicion remains that this is inflammatory in nature given problem 2   Consider also medication side effect given peripheral eosinophilia  Currently on ampicillin after detailed discussion on 1/18  Blood culture so far without growth  Conversation again on 1/19 and repeat urine cultures obtained now with only low growth of Candida  Entire catheter system was not changed to avoid further trauma/side effects and percutaneous nephrostomy is draining clear urine and unlikely to be contributing  He remains largely stable, exam unchanged and labs and vitals are stable  Suspicion is overall low for an ongoing urinary infection contributing to his current state/decline  We will plan to complete course of therapy given prior discussions with family  Potential plans for discharge to [facility], shortly  Transition to amoxicillin 500 every 12h through Wednesday morning dose  This will complete total 7 days of therapy  Continue fluconazole 100 mg every 24 hours through 2/5, total 14 days  Both medications have been renally dose adjusted and last doses ordered  Continue to trend fever curve/vitals  Repeat CBC and chemistry ordered for tomorrow  Maintain off Tylenol  Monitor for any progressive symptoms  Recommend detailed review of patient's other medications    Will ask ID office to follow-up on final blood cultures, for completeness  Additional supportive care/discharge as per primary     2   Metastatic high-grade treatment refractory bladder cancer   Patient unfortunately with progressive metastatic disease now also seen on CT imaging   Also having chronic/persistent hematuria   I suspect that this is contributing to the above  Antibiotic as above for now  Recommend follow-up with oncology as outpatient  Continue to trend fever curve/vitals  Repeat labs ordered for tomorrow if admitted  Additional cares per primary     3   Chronic kidney disease   Significant kidney dysfunction at baseline   Creatinine clearance calculated and this does affect antibiotic dosing  To biotic dosing reviewed in detail with clinical ID pharmacist   Amoxicillin and fluconazole dosing as above  Repeat chemistry tomorrow  Further dose adjust medications as needed  Fluid hydration as per primary  Avoid nephrotoxic agents     4   Acute on chronic anemia   This is related to problem #2   Ongoing hemoglobin monitoring and transfusional support as per primary     5   Recently treated Enterobacter bacteremia   Patient completed treatment   Helane Neno which was given also covered ESBL E  coli that had previously been isolated in the patient's urine   Plans for targeted therapy as above     Above plan discussed in detail with patient and with primary service attending  ID consult service will sign off at this time  Please call if questions  Antibiotics:  Ampicillin 6  Fluconazole 2    24 Hour events:  Yesterday and overnight notes reviewed and no acute events noted    Subjective:  Seen at bedside this morning and he denied having any nausea, vomiting, chest pain or shortness of breath  Reporting still having some suprapubic/lower abdominal discomfort and continues with intermittent hematuria in his catheter  Denies significant pain surrounding his percutaneous nephrostomies and they appear to be draining clear urine    Urine cultures and blood cultures reviewed and dosing reviewed with clinical ID pharmacist     Objective:  Vitals:  Temp:  [98 7 °F (37 1 °C)-98 9 °F (37 2 °C)] 98 9 °F (37 2 °C)  HR:  [] 87  Resp:  [16-19] 16  BP: (112-147)/(73-85) 147/85  SpO2:  [94 %-99 %] 97 %  Temp (24hrs), Av 8 °F (37 1 °C), Min:98 7 °F (37 1 °C), Max:98 9 °F (37 2 °C)  Current: Temperature: 98 9 °F (37 2 °C)    Physical Exam:   General Appearance:  Alert, interactive, nontoxic, no acute distress  Chronically ill-appearing and frail  Throat: Oropharynx moist without lesions  Lungs:   Clear to auscultation bilaterally; no wheezes, rhonchi or rales; respirations unlabored on room air   Heart:  RRR; no murmur, rub or gallop   Abdomen:   Soft, non-tender, non-distended, positive bowel sounds  No suprapubic discomfort that I can elicit and no significant discomfort on palpation of the flanks bilaterally  Shirley catheter again still with hematuria  Extremities: No clubbing, cyanosis or edema   Skin: No new rashes or lesions  No new draining wounds noted  Labs, Imaging, & Other studies:   All pertinent labs and imaging studies were personally reviewed as below  Results from last 7 days   Lab Units 01/23/23  0622 01/22/23  0450 01/21/23  0630   WBC Thousand/uL 12 63* 12 16* 13 44*   HEMOGLOBIN g/dL 7 4* 7 7* 7 7*   PLATELETS Thousands/uL 260 277 242     Results from last 7 days   Lab Units 01/23/23  0622 01/21/23  0630 01/20/23  1311 01/19/23  0515   POTASSIUM mmol/L 4 7   < > 5 3 5 1   CHLORIDE mmol/L 103   < > 101 101   CO2 mmol/L 26   < > 26 27   BUN mg/dL 54*   < > 53* 49*   CREATININE mg/dL 2 99*   < > 2 87* 2 75*   EGFR ml/min/1 73sq m 18   < > 19 20   CALCIUM mg/dL 8 9   < > 9 0 9 0   AST U/L 23  --  39 39   ALT U/L 32  --  43 41   ALK PHOS U/L 88  --  98 102    < > = values in this interval not displayed  Results from last 7 days   Lab Units 01/20/23  1133 01/19/23  1202 01/19/23  1023   BLOOD CULTURE   --  No Growth at 72 hrs  No Growth at 72 hrs  URINE CULTURE  40,000-49,000 cfu/ml Candida albicans*  --   --        Lab interpretation/comments: White count essentially remained stable even with adjustments to antibiotic and addition of antifungal   Peripheral eosinophilia continues on lab      Imaging interpretation/comments: No new images over the weekend  Culture data: Urine culture which was collected on ampicillin is only showing low growth of Candida  Suspect that this represents colonization  Treatment however started given prior discussions with patient's family  QTC on recent EKG unremarkable

## 2023-01-24 ENCOUNTER — TRANSITIONAL CARE MANAGEMENT (OUTPATIENT)
Dept: INTERNAL MEDICINE CLINIC | Facility: CLINIC | Age: 78
End: 2023-01-24

## 2023-01-24 ENCOUNTER — TELEPHONE (OUTPATIENT)
Dept: HEMATOLOGY ONCOLOGY | Facility: CLINIC | Age: 78
End: 2023-01-24

## 2023-01-24 VITALS
HEART RATE: 86 BPM | BODY MASS INDEX: 24.67 KG/M2 | SYSTOLIC BLOOD PRESSURE: 126 MMHG | OXYGEN SATURATION: 97 % | RESPIRATION RATE: 18 BRPM | TEMPERATURE: 98 F | DIASTOLIC BLOOD PRESSURE: 84 MMHG | HEIGHT: 76 IN | WEIGHT: 202.6 LBS

## 2023-01-24 LAB
ABO GROUP BLD BPU: NORMAL
ALBUMIN SERPL BCP-MCNC: 3.2 G/DL (ref 3.5–5)
ALP SERPL-CCNC: 94 U/L (ref 34–104)
ALT SERPL W P-5'-P-CCNC: 29 U/L (ref 7–52)
ANION GAP SERPL CALCULATED.3IONS-SCNC: 9 MMOL/L (ref 4–13)
AST SERPL W P-5'-P-CCNC: 21 U/L (ref 13–39)
ATRIAL RATE: 84 BPM
BACTERIA BLD CULT: NORMAL
BACTERIA BLD CULT: NORMAL
BASOPHILS # BLD AUTO: 0.08 THOUSANDS/ÂΜL (ref 0–0.1)
BASOPHILS NFR BLD AUTO: 1 % (ref 0–1)
BILIRUB SERPL-MCNC: 0.41 MG/DL (ref 0.2–1)
BPU ID: NORMAL
BUN SERPL-MCNC: 57 MG/DL (ref 5–25)
CALCIUM ALBUM COR SERPL-MCNC: 9.9 MG/DL (ref 8.3–10.1)
CALCIUM SERPL-MCNC: 9.3 MG/DL (ref 8.4–10.2)
CHLORIDE SERPL-SCNC: 101 MMOL/L (ref 96–108)
CO2 SERPL-SCNC: 27 MMOL/L (ref 21–32)
CREAT SERPL-MCNC: 2.96 MG/DL (ref 0.6–1.3)
CROSSMATCH: NORMAL
EOSINOPHIL # BLD AUTO: 1.08 THOUSAND/ÂΜL (ref 0–0.61)
EOSINOPHIL NFR BLD AUTO: 8 % (ref 0–6)
ERYTHROCYTE [DISTWIDTH] IN BLOOD BY AUTOMATED COUNT: 16.7 % (ref 11.6–15.1)
GFR SERPL CREATININE-BSD FRML MDRD: 19 ML/MIN/1.73SQ M
GLUCOSE SERPL-MCNC: 105 MG/DL (ref 65–140)
GLUCOSE SERPL-MCNC: 107 MG/DL (ref 65–140)
GLUCOSE SERPL-MCNC: 161 MG/DL (ref 65–140)
HCT VFR BLD AUTO: 25.9 % (ref 36.5–49.3)
HGB BLD-MCNC: 8.2 G/DL (ref 12–17)
IMM GRANULOCYTES # BLD AUTO: 0.1 THOUSAND/UL (ref 0–0.2)
IMM GRANULOCYTES NFR BLD AUTO: 1 % (ref 0–2)
LYMPHOCYTES # BLD AUTO: 2.07 THOUSANDS/ÂΜL (ref 0.6–4.47)
LYMPHOCYTES NFR BLD AUTO: 15 % (ref 14–44)
MCH RBC QN AUTO: 29.4 PG (ref 26.8–34.3)
MCHC RBC AUTO-ENTMCNC: 31.7 G/DL (ref 31.4–37.4)
MCV RBC AUTO: 93 FL (ref 82–98)
MONOCYTES # BLD AUTO: 1.13 THOUSAND/ÂΜL (ref 0.17–1.22)
MONOCYTES NFR BLD AUTO: 8 % (ref 4–12)
NEUTROPHILS # BLD AUTO: 9.38 THOUSANDS/ÂΜL (ref 1.85–7.62)
NEUTS SEG NFR BLD AUTO: 67 % (ref 43–75)
NRBC BLD AUTO-RTO: 0 /100 WBCS
P AXIS: 40 DEGREES
PLATELET # BLD AUTO: 296 THOUSANDS/UL (ref 149–390)
PMV BLD AUTO: 9.7 FL (ref 8.9–12.7)
POTASSIUM SERPL-SCNC: 4.2 MMOL/L (ref 3.5–5.3)
PR INTERVAL: 194 MS
PROT SERPL-MCNC: 7.5 G/DL (ref 6.4–8.4)
QRS AXIS: 3 DEGREES
QRSD INTERVAL: 104 MS
QT INTERVAL: 366 MS
QTC INTERVAL: 432 MS
RBC # BLD AUTO: 2.79 MILLION/UL (ref 3.88–5.62)
SODIUM SERPL-SCNC: 137 MMOL/L (ref 135–147)
T WAVE AXIS: 65 DEGREES
UNIT DISPENSE STATUS: NORMAL
UNIT PRODUCT CODE: NORMAL
UNIT PRODUCT VOLUME: 350 ML
UNIT RH: NORMAL
VENTRICULAR RATE: 84 BPM
WBC # BLD AUTO: 13.84 THOUSAND/UL (ref 4.31–10.16)

## 2023-01-24 RX ORDER — AMOXICILLIN 500 MG/1
500 CAPSULE ORAL EVERY 12 HOURS SCHEDULED
Qty: 2 CAPSULE | Refills: 0
Start: 2023-01-24 | End: 2023-01-25

## 2023-01-24 RX ORDER — FLUCONAZOLE 100 MG/1
100 TABLET ORAL DAILY
Qty: 12 TABLET | Refills: 0
Start: 2023-01-25 | End: 2023-02-06

## 2023-01-24 RX ORDER — OXYCODONE HYDROCHLORIDE 10 MG/1
10 TABLET ORAL EVERY 6 HOURS PRN
Qty: 20 TABLET | Refills: 0 | Status: SHIPPED | OUTPATIENT
Start: 2023-01-24 | End: 2023-01-29

## 2023-01-24 RX ORDER — OXYBUTYNIN CHLORIDE 5 MG/1
5 TABLET ORAL 3 TIMES DAILY
Refills: 0
Start: 2023-01-24

## 2023-01-24 RX ADMIN — FLUCONAZOLE 100 MG: 100 TABLET ORAL at 08:58

## 2023-01-24 RX ADMIN — OXYBUTYNIN CHLORIDE 5 MG: 5 TABLET ORAL at 08:58

## 2023-01-24 RX ADMIN — METOPROLOL TARTRATE 25 MG: 25 TABLET, FILM COATED ORAL at 08:58

## 2023-01-24 RX ADMIN — CYANOCOBALAMIN TAB 500 MCG 1000 MCG: 500 TAB at 08:58

## 2023-01-24 RX ADMIN — AMOXICILLIN 500 MG: 500 CAPSULE ORAL at 09:00

## 2023-01-24 RX ADMIN — PANTOPRAZOLE SODIUM 20 MG: 20 TABLET, DELAYED RELEASE ORAL at 06:45

## 2023-01-24 RX ADMIN — FUROSEMIDE 20 MG: 20 TABLET ORAL at 08:58

## 2023-01-24 RX ADMIN — OXYCODONE HYDROCHLORIDE 10 MG: 10 TABLET ORAL at 11:32

## 2023-01-24 RX ADMIN — CALCITRIOL 0.25 MCG: 0.25 CAPSULE, LIQUID FILLED ORAL at 08:58

## 2023-01-24 RX ADMIN — EZETIMIBE 10 MG: 10 TABLET ORAL at 08:58

## 2023-01-24 RX ADMIN — ARIPIPRAZOLE 2 MG: 2 TABLET ORAL at 08:58

## 2023-01-24 NOTE — ASSESSMENT & PLAN NOTE
· Patient has persistent hematuria due to bladder cancer  History of recurrent UTI on suppressive Bactrim  · CT A/P: Stable bladder wall thickening, compatible with known malignancy  Increased hyperdensity within the bladder lumen, compatible with new blood clot  · Recent admission at [facility] 12/12-12/22 for hematuria and obstructive uropathy in the setting of known bladder cancer s/p BCG and subsequent chemotherapy/radiation s/p bilateral chronic PCN tubes   Noted to have new right-sided hydronephrosis on CT s/p cystoscopy, clot evacuation w/ right ureteral stenting on 12/14 by Dr Dipesh Adam  Urology does not feel patient needs anything other than this right now    He unfortunately is likely to have chronic hematuria

## 2023-01-24 NOTE — ASSESSMENT & PLAN NOTE
· Chronic pleural effusion  CT showing Stable small left pleural effusion with drainage catheter in place  Stable epicardial metastasis  · 1/18-chest CTA obtained for routine surveillance; did show increased fluid on left side      · Patient with Pleurx catheter

## 2023-01-24 NOTE — DISCHARGE SUMMARY
[address] [facility]  Discharge- [facility] [place] 1945, 76 y o  male MRN: [id number]  Unit/Bed#: E5 -01 Encounter: [id number]  Primary Care Provider: Piter Ocasio MD   Date and time admitted to hospital: 1/7/2023 10:17 PM    * Bacteremia due to Enterobacter species  Assessment & Plan  · Patient was transferred from [facility] due to fever and positive blood cultures  · Blood culture growing Enterobacter  · His Port-A-Cath was thought to be the source  · Port-A-Cath removed by interventional radiology  · Repeat blood cultures no growth at 72 hours  Repeat CT abdomen pelvis with no acute findings; redemonstrated bladder cancer with Shirley catheter insertion and stable metastatic disease in liver  To completed abx course on amoxicillin per ID      Acute on chronic blood loss anemia  Assessment & Plan  · History of hematuria due to bladder cancer  · S/p 2 units pRBC transfused  · H/h stable on discharge    Nephrostomy status ([facility] [address] )  Assessment & Plan  · Chronic bilateral nephrostomy tubes  · Prior to transfer patient reported abdominal pain, CT was done and shows: Stable bilateral percutaneous nephrostomy tubes with interval placement of a right double-J nephroureteral stent  Stable mild right upper pole caliectasis, with resolution of previously seen lower pole caliectasis and hydroureter  Chronic pleural effusion  Assessment & Plan  · Chronic pleural effusion  CT showing Stable small left pleural effusion with drainage catheter in place  Stable epicardial metastasis  · 1/18-chest CTA obtained for routine surveillance; did show increased fluid on left side  · Patient with Pleurx catheter    Gross hematuria  Assessment & Plan  · Patient has persistent hematuria due to bladder cancer  History of recurrent UTI on suppressive Bactrim  · CT A/P: Stable bladder wall thickening, compatible with known malignancy    Increased hyperdensity within the bladder lumen, compatible with new blood clot  · Recent admission at [facility] 12/12-12/22 for hematuria and obstructive uropathy in the setting of known bladder cancer s/p BCG and subsequent chemotherapy/radiation s/p bilateral chronic PCN tubes  Noted to have new right-sided hydronephrosis on CT s/p cystoscopy, clot evacuation w/ right ureteral stenting on 12/14 by Dr Kristan Dowd  Urology does not feel patient needs anything other than this right now    He unfortunately is likely to have chronic hematuria        Malignant neoplasm of anterior wall of urinary bladder ([address] )  Assessment & Plan  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, recurrence with CIS in 2016 BCG again  BCG refractory disease with pelvic metastases now s/p chemoradiation 2021, on current immunotherapy  · Chronic B/L PCN tubes  · CT A/P Jan 7: Stable bladder wall thickening, compatible with known malignancy  Stable metastatic retroperitoneal lymphadenopathy  Stable epicardial metastasis  · Per oncology note on Jan 6: Chemotherapy treatment should remain on hold for now untill he is clinically stable/discharged; particularly since he was positive flu  To follow-up with primary oncologist after discharge for reevaluation prior to resume treatment  · Port removed 2/2 bacteremia     Stable from urology point of view for discharge with indwelling Shirley catheter and manual irrigation until followed up in the office        Coronary artery disease of native artery of native heart with stable angina pectoris (HCC)  Assessment & Plan  · Outpatient regimen ASA, Imdur, metoprolol, zetia  · Repeat echocardiogram noted; EF 55% with grade 1 diastolic dysfunction      Discharging Physician / Practitioner: Ondina Whiting MD  PCP: Piter Ocasio MD  Admission Date:   Admission Orders (From admission, onward)     Ordered        01/07/23 2245  Inpatient Admission  Once                      Discharge Date: 01/24/23    Medical Problems     Resolved Problems  Date Reviewed: 1/24/2023   None         Consultations During Hospital Stay:  · Urology  · Infectious Disease  · Oncology  · Cardiology    Procedures Performed:   · none    Significant Findings / Test Results:   CT abdomen pelvis wo contrast    Result Date: 1/7/2023  Impression: 1  Stable bladder wall thickening, compatible with known malignancy  Increased hyperdensity within the bladder lumen, compatible with new blood clot  2   Stable bilateral percutaneous nephrostomy tubes with interval placement of a right double-J nephroureteral stent  Stable mild right upper pole caliectasis, with resolution of previously seen lower pole caliectasis and hydroureter  Persistent blood products in the distal right ureter  No left hydronephrosis  3   Stable small left pleural effusion with drainage catheter in place  Stable epicardial metastasis  4   Stable metastatic retroperitoneal lymphadenopathy  Workstation performed: [id number]     XR chest portable    Result Date: 1/12/2023  Impression: Left basilar opacity and blunting of the left CP angle and obscuration of the left Dome of diaphragm, stable, likely related to pleural thickening and small effusion No worsening seen Workstation performed: [id number]     IR port removal    Result Date: 1/10/2023  Impression: Impression: Successful removal of the chest port reservoir and catheter  The catheter tip was sent for culture   Workstation performed: [id number]     IR nephrostomy tube check/change/reposition/reinsertion/upsize    Result Date: 1/10/2023  · Impression: Impression: Bilateral nephrostomy exchange Workstation performed: [id number]   ·     Incidental Findings:   · none     Test Results Pending at Discharge (will require follow up):   · none     Outpatient Tests Requested:  · none    Complications:  none    Reason for Admission: Bacteremia    Hospital Course:     Claudia Berman is a 76 y o  male patient who originally presented to the hospital on 1/7/2023 due to to be unresponsive  He was thought that he was symptomatic anemic given history of bladder malignancy, chronic hematuria  His blood cultures ultimately were positive for bacteremia and patient was transferred to [facility] for further evaluation with infectious disease  Suspected that patient may be bacteremic due to his Port-A-Cath for his transfusions and it was removed  His repeat blood cultures ultimately were negative  Infectious disease recommended patient completing antibiotic course with Augmentin and Diflucan for several additional days upon discharge  Did have hematuria and required 2 units PRBC transfused, suspect likely chronic as patient will continue to have bleeding given bladder cancer  Hemoglobin has been stable on discharge    Patient has been on palliative treatment for his bladder cancer, noted to be metastatic and high-grade  Blood Port-A-Cath removed, patient to follow-up outpatient to evaluate for resuming treatment  Patient discharged to acute rehab at [facility]  Please see above list of diagnoses and related plan for additional information  Condition at Discharge: fair     Discharge Day Visit / Exam:     Subjective:    No events overnight, reports doing well  Denies any chest pain, shortness breath, nausea or vomiting  Vitals: Blood Pressure: 126/84 (01/24/23 0859)  Pulse: 86 (01/24/23 1055)  Temperature: 98 °F (36 7 °C) (01/24/23 0732)  Temp Source: Oral (01/24/23 0732)  Respirations: 18 (01/24/23 0732)  Height: 6' 4" (193 cm) (01/11/23 1515)  Weight - Scale: 91 9 kg (202 lb 9 6 oz) (01/24/23 0600)  SpO2: 97 % (01/24/23 1055)  Exam:   Physical Exam  Vitals and nursing note reviewed  Constitutional:       General: He is not in acute distress  Appearance: He is well-developed  HENT:      Head: Normocephalic and atraumatic  Eyes:      Conjunctiva/sclera: Conjunctivae normal    Cardiovascular:      Rate and Rhythm: Normal rate     Pulmonary:      Effort: Pulmonary effort is normal  No respiratory distress  Comments: Left-sided Pleurx catheter in place  Abdominal:      Tenderness: There is no abdominal tenderness  Genitourinary:     Comments: Shirley catheter in place; bilateral nephrostomy tubes  Musculoskeletal:      Right lower leg: No edema  Left lower leg: No edema  Skin:     General: Skin is warm and dry  Neurological:      Mental Status: He is alert  Mental status is at baseline  Psychiatric:         Mood and Affect: Mood normal          Discharge instructions/Information to patient and family:   See after visit summary for information provided to patient and family  Provisions for Follow-Up Care:  See after visit summary for information related to follow-up care and any pertinent home health orders  Disposition:     Home    Planned Readmission: none     Discharge Statement:  I spent 40 minutes discharging the patient  This time was spent on the day of discharge  I had direct contact with the patient on the day of discharge  Greater than 50% of the total time was spent examining patient, answering all patient questions, arranging and discussing plan of care with patient as well as directly providing post-discharge instructions  Additional time then spent on discharge activities  Discharge Medications:  See after visit summary for reconciled discharge medications provided to patient and family        ** Please Note: This note has been constructed using a voice recognition system **

## 2023-01-24 NOTE — PLAN OF CARE
Problem: Nutrition/Hydration-ADULT  Goal: Nutrient/Hydration intake appropriate for improving, restoring or maintaining nutritional needs  Description: Monitor and assess patient's nutrition/hydration status for malnutrition  Collaborate with interdisciplinary team and initiate plan and interventions as ordered  Monitor patient's weight and dietary intake as ordered or per policy  Utilize nutrition screening tool and intervene as necessary  Determine patient's food preferences and provide high-protein, high-caloric foods as appropriate       INTERVENTIONS:  - Monitor oral intake, urinary output, labs, and treatment plans  - Assess nutrition and hydration status and recommend course of action  - Evaluate amount of meals eaten  - Assist patient with eating if necessary   - Allow adequate time for meals  - Recommend/ encourage appropriate diets, oral nutritional supplements, and vitamin/mineral supplements  - Order, calculate, and assess calorie counts as needed  - Recommend, monitor, and adjust tube feedings and TPN/PPN based on assessed needs  - Assess need for intravenous fluids  - Provide specific nutrition/hydration education as appropriate  - Include patient/family/caregiver in decisions related to nutrition  1/24/2023 1210 by Atif Tapia RN  Outcome: Completed  1/24/2023 0706 by Atif Tapia RN  Outcome: Progressing     Problem: Prexisting or High Potential for Compromised Skin Integrity  Goal: Skin integrity is maintained or improved  Description: INTERVENTIONS:  - Identify patients at risk for skin breakdown  - Assess and monitor skin integrity  - Assess and monitor nutrition and hydration status  - Monitor labs   - Assess for incontinence   - Turn and reposition patient  - Assist with mobility/ambulation  - Relieve pressure over bony prominences  - Avoid friction and shearing  - Provide appropriate hygiene as needed including keeping skin clean and dry  - Evaluate need for skin moisturizer/barrier cream  - Collaborate with interdisciplinary team   - Patient/family teaching  - Consider wound care consult   1/24/2023 1210 by Saqib Soto RN  Outcome: Completed  1/24/2023 0706 by Saqib Soto RN  Outcome: Progressing     Problem: MOBILITY - ADULT  Goal: Maintain or return to baseline ADL function  Description: INTERVENTIONS:  -  Assess patient's ability to carry out ADLs; assess patient's baseline for ADL function and identify physical deficits which impact ability to perform ADLs (bathing, care of mouth/teeth, toileting, grooming, dressing, etc )  - Assess/evaluate cause of self-care deficits   - Assess range of motion  - Assess patient's mobility; develop plan if impaired  - Assess patient's need for assistive devices and provide as appropriate  - Encourage maximum independence but intervene and supervise when necessary  - Involve family in performance of ADLs  - Assess for home care needs following discharge   - Consider OT consult to assist with ADL evaluation and planning for discharge  - Provide patient education as appropriate  1/24/2023 1210 by Saqib Soto RN  Outcome: Completed  1/24/2023 0706 by Saqib Soto RN  Outcome: Progressing  Goal: Maintains/Returns to pre admission functional level  Description: INTERVENTIONS:  - Perform BMAT or MOVE assessment daily    - Set and communicate daily mobility goal to care team and patient/family/caregiver  - Collaborate with rehabilitation services on mobility goals if consulted  - Perform Range of Motion 3 times a day  - Reposition patient every 2 hours    - Dangle patient 3 times a day  - Stand patient 3 times a day  - Ambulate patient 3 times a day  - Out of bed to chair 3 times a day   - Out of bed for meals 3 times a day  - Out of bed for toileting  - Record patient progress and toleration of activity level   1/24/2023 1210 by Saqib Soto RN  Outcome: Completed  1/24/2023 0706 by Saqib Soto RN  Outcome: Progressing     Problem: Potential for Falls  Goal: Patient will remain free of falls  Description: INTERVENTIONS:  - Educate patient/family on patient safety including physical limitations  - Instruct patient to call for assistance with activity   - Consult OT/PT to assist with strengthening/mobility   - Keep Call bell within reach  - Keep bed low and locked with side rails adjusted as appropriate  - Keep care items and personal belongings within reach  - Initiate and maintain comfort rounds  - Make Fall Risk Sign visible to staff  - Offer Toileting every 2 Hours, in advance of need  - Initiate/Maintain bed alarm  - Obtain necessary fall risk management equipment: Alarm  - Apply yellow socks and bracelet for high fall risk patients  - Consider moving patient to room near nurses station  1/24/2023 1210 by Flip Means RN  Outcome: Completed  1/24/2023 0706 by Flip Means RN  Outcome: Progressing     Problem: PAIN - ADULT  Goal: Verbalizes/displays adequate comfort level or baseline comfort level  Description: Interventions:  - Encourage patient to monitor pain and request assistance  - Assess pain using appropriate pain scale  - Administer analgesics based on type and severity of pain and evaluate response  - Implement non-pharmacological measures as appropriate and evaluate response  - Consider cultural and social influences on pain and pain management  - Notify physician/advanced practitioner if interventions unsuccessful or patient reports new pain  1/24/2023 1210 by Flip Means RN  Outcome: Completed  1/24/2023 0706 by Flip Means RN  Outcome: Progressing     Problem: INFECTION - ADULT  Goal: Absence or prevention of progression during hospitalization  Description: INTERVENTIONS:  - Assess and monitor for signs and symptoms of infection  - Monitor lab/diagnostic results  - Monitor all insertion sites, i e  indwelling lines, tubes, and drains  - Monitor endotracheal if appropriate and nasal secretions for changes in amount and color  - [place] appropriate cooling/warming therapies per order  - Administer medications as ordered  - Instruct and encourage patient and family to use good hand hygiene technique  - Identify and instruct in appropriate isolation precautions for identified infection/condition  1/24/2023 1210 by Christina Jaramillo RN  Outcome: Completed  1/24/2023 0706 by Christina Jaramillo RN  Outcome: Progressing  Goal: Absence of fever/infection during neutropenic period  Description: INTERVENTIONS:  - Monitor WBC    1/24/2023 1210 by Christina Jaramillo RN  Outcome: Completed  1/24/2023 0706 by Christina Jaramillo RN  Outcome: Progressing     Problem: SAFETY ADULT  Goal: Maintain or return to baseline ADL function  Description: INTERVENTIONS:  -  Assess patient's ability to carry out ADLs; assess patient's baseline for ADL function and identify physical deficits which impact ability to perform ADLs (bathing, care of mouth/teeth, toileting, grooming, dressing, etc )  - Assess/evaluate cause of self-care deficits   - Assess range of motion  - Assess patient's mobility; develop plan if impaired  - Assess patient's need for assistive devices and provide as appropriate  - Encourage maximum independence but intervene and supervise when necessary  - Involve family in performance of ADLs  - Assess for home care needs following discharge   - Consider OT consult to assist with ADL evaluation and planning for discharge  - Provide patient education as appropriate  1/24/2023 1210 by Christina Jaramillo RN  Outcome: Completed  1/24/2023 0706 by Christina Jaramillo RN  Outcome: Progressing  Goal: Maintains/Returns to pre admission functional level  Description: INTERVENTIONS:  - Perform BMAT or MOVE assessment daily    - Set and communicate daily mobility goal to care team and patient/family/caregiver  - Collaborate with rehabilitation services on mobility goals if consulted  - Perform Range of Motion 3 times a day  - Reposition patient every 2 hours    - Dangle patient 3 times a day  - Stand patient 3 times a day  - Ambulate patient 3 times a day  - Out of bed to chair 3 times a day   - Out of bed for meals 3 times a day  - Out of bed for toileting  - Record patient progress and toleration of activity level   1/24/2023 1210 by Flip Means RN  Outcome: Completed  1/24/2023 0706 by lFip Means RN  Outcome: Progressing  Goal: Patient will remain free of falls  Description: INTERVENTIONS:  - Educate patient/family on patient safety including physical limitations  - Instruct patient to call for assistance with activity   - Consult OT/PT to assist with strengthening/mobility   - Keep Call bell within reach  - Keep bed low and locked with side rails adjusted as appropriate  - Keep care items and personal belongings within reach  - Initiate and maintain comfort rounds  - Make Fall Risk Sign visible to staff  - Offer Toileting every 2 Hours, in advance of need  - Initiate/Maintain bed alarm  - Obtain necessary fall risk management equipment: Alarm  - Apply yellow socks and bracelet for high fall risk patients  - Consider moving patient to room near nurses station  1/24/2023 1210 by Flip Means RN  Outcome: Completed  1/24/2023 0706 by Flip Means RN  Outcome: Progressing     Problem: GENITOURINARY - ADULT  Goal: Maintains or returns to baseline urinary function  Description: INTERVENTIONS:  - Assess urinary function  - Encourage oral fluids to ensure adequate hydration if ordered  - Administer IV fluids as ordered to ensure adequate hydration  - Administer ordered medications as needed  - Offer frequent toileting  - Follow urinary retention protocol if ordered  1/24/2023 1210 by Flip Means RN  Outcome: Completed  1/24/2023 0706 by Flip Means RN  Outcome: Progressing  Goal: Absence of urinary retention  Description: INTERVENTIONS:  - Assess patient’s ability to void and empty bladder  - Monitor I/O  - Bladder scan as needed  - Discuss with physician/AP medications to alleviate retention as needed  - Discuss catheterization for long term situations as appropriate  1/24/2023 1210 by Bulmaro Gonzalez RN  Outcome: Completed  1/24/2023 0706 by Bulmaro Gonzalez RN  Outcome: Progressing  Goal: Urinary catheter remains patent  Description: INTERVENTIONS:  - Assess patency of urinary catheter  - If patient has a chronic wagner, consider changing catheter if non-functioning  - Follow guidelines for intermittent irrigation of non-functioning urinary catheter  1/24/2023 1210 by Bulmaro Gonzalez RN  Outcome: Completed  1/24/2023 0706 by Bulmaro Gonzalez RN  Outcome: Progressing     Problem: HEMATOLOGIC - ADULT  Goal: Maintains hematologic stability  Description: INTERVENTIONS  - Assess for signs and symptoms of bleeding or hemorrhage  - Monitor labs  - Administer supportive blood products/factors as ordered and appropriate  1/24/2023 1210 by Bulmaro Gonzalez RN  Outcome: Completed  1/24/2023 0706 by Bulmaro Gonzalez RN  Outcome: Progressing     Problem: MUSCULOSKELETAL - ADULT  Goal: Maintain or return mobility to safest level of function  Description: INTERVENTIONS:  - Assess patient's ability to carry out ADLs; assess patient's baseline for ADL function and identify physical deficits which impact ability to perform ADLs (bathing, care of mouth/teeth, toileting, grooming, dressing, etc )  - Assess/evaluate cause of self-care deficits   - Assess range of motion  - Assess patient's mobility  - Assess patient's need for assistive devices and provide as appropriate  - Encourage maximum independence but intervene and supervise when necessary  - Involve family in performance of ADLs  - Assess for home care needs following discharge   - Consider OT consult to assist with ADL evaluation and planning for discharge  - Provide patient education as appropriate  1/24/2023 1210 by Bulmaro Gonzalez RN  Outcome: Completed  1/24/2023 0706 by Bulmaro Gonzalez RN  Outcome: Progressing  Goal: Maintain proper alignment of affected body part  Description: INTERVENTIONS:  - Support, maintain and protect limb and body alignment  - Provide patient/ family with appropriate education  1/24/2023 1210 by Connie Koenig RN  Outcome: Completed  1/24/2023 0706 by Connie Koenig RN  Outcome: Progressing

## 2023-01-24 NOTE — PLAN OF CARE
Problem: Nutrition/Hydration-ADULT  Goal: Nutrient/Hydration intake appropriate for improving, restoring or maintaining nutritional needs  Description: Monitor and assess patient's nutrition/hydration status for malnutrition  Collaborate with interdisciplinary team and initiate plan and interventions as ordered  Monitor patient's weight and dietary intake as ordered or per policy  Utilize nutrition screening tool and intervene as necessary  Determine patient's food preferences and provide high-protein, high-caloric foods as appropriate       INTERVENTIONS:  - Monitor oral intake, urinary output, labs, and treatment plans  - Assess nutrition and hydration status and recommend course of action  - Evaluate amount of meals eaten  - Assist patient with eating if necessary   - Allow adequate time for meals  - Recommend/ encourage appropriate diets, oral nutritional supplements, and vitamin/mineral supplements  - Order, calculate, and assess calorie counts as needed  - Recommend, monitor, and adjust tube feedings and TPN/PPN based on assessed needs  - Assess need for intravenous fluids  - Provide specific nutrition/hydration education as appropriate  - Include patient/family/caregiver in decisions related to nutrition  Outcome: Progressing     Problem: Prexisting or High Potential for Compromised Skin Integrity  Goal: Skin integrity is maintained or improved  Description: INTERVENTIONS:  - Identify patients at risk for skin breakdown  - Assess and monitor skin integrity  - Assess and monitor nutrition and hydration status  - Monitor labs   - Assess for incontinence   - Turn and reposition patient  - Assist with mobility/ambulation  - Relieve pressure over bony prominences  - Avoid friction and shearing  - Provide appropriate hygiene as needed including keeping skin clean and dry  - Evaluate need for skin moisturizer/barrier cream  - Collaborate with interdisciplinary team   - Patient/family teaching  - Consider wound care consult   Outcome: Progressing     Problem: MOBILITY - ADULT  Goal: Maintain or return to baseline ADL function  Description: INTERVENTIONS:  -  Assess patient's ability to carry out ADLs; assess patient's baseline for ADL function and identify physical deficits which impact ability to perform ADLs (bathing, care of mouth/teeth, toileting, grooming, dressing, etc )  - Assess/evaluate cause of self-care deficits   - Assess range of motion  - Assess patient's mobility; develop plan if impaired  - Assess patient's need for assistive devices and provide as appropriate  - Encourage maximum independence but intervene and supervise when necessary  - Involve family in performance of ADLs  - Assess for home care needs following discharge   - Consider OT consult to assist with ADL evaluation and planning for discharge  - Provide patient education as appropriate  Outcome: Progressing  Goal: Maintains/Returns to pre admission functional level  Description: INTERVENTIONS:  - Perform BMAT or MOVE assessment daily    - Set and communicate daily mobility goal to care team and patient/family/caregiver  - Collaborate with rehabilitation services on mobility goals if consulted  - Perform Range of Motion 3 times a day  - Reposition patient every 2 hours    - Dangle patient 3 times a day  - Stand patient 3 times a day  - Ambulate patient 3 times a day  - Out of bed to chair 3 times a day   - Out of bed for meals 3 times a day  - Out of bed for toileting  - Record patient progress and toleration of activity level   Outcome: Progressing     Problem: Potential for Falls  Goal: Patient will remain free of falls  Description: INTERVENTIONS:  - Educate patient/family on patient safety including physical limitations  - Instruct patient to call for assistance with activity   - Consult OT/PT to assist with strengthening/mobility   - Keep Call bell within reach  - Keep bed low and locked with side rails adjusted as appropriate  - Keep care items and personal belongings within reach  - Initiate and maintain comfort rounds  - Make Fall Risk Sign visible to staff  - Offer Toileting every 2 Hours, in advance of need  - Initiate/Maintain bed alarm  - Obtain necessary fall risk management equipment: Alarm  - Apply yellow socks and bracelet for high fall risk patients  - Consider moving patient to room near nurses station  Outcome: Progressing     Problem: PAIN - ADULT  Goal: Verbalizes/displays adequate comfort level or baseline comfort level  Description: Interventions:  - Encourage patient to monitor pain and request assistance  - Assess pain using appropriate pain scale  - Administer analgesics based on type and severity of pain and evaluate response  - Implement non-pharmacological measures as appropriate and evaluate response  - Consider cultural and social influences on pain and pain management  - Notify physician/advanced practitioner if interventions unsuccessful or patient reports new pain  Outcome: Progressing     Problem: INFECTION - ADULT  Goal: Absence or prevention of progression during hospitalization  Description: INTERVENTIONS:  - Assess and monitor for signs and symptoms of infection  - Monitor lab/diagnostic results  - Monitor all insertion sites, i e  indwelling lines, tubes, and drains  - Monitor endotracheal if appropriate and nasal secretions for changes in amount and color  - [place] appropriate cooling/warming therapies per order  - Administer medications as ordered  - Instruct and encourage patient and family to use good hand hygiene technique  - Identify and instruct in appropriate isolation precautions for identified infection/condition  Outcome: Progressing  Goal: Absence of fever/infection during neutropenic period  Description: INTERVENTIONS:  - Monitor WBC    Outcome: Progressing     Problem: SAFETY ADULT  Goal: Maintain or return to baseline ADL function  Description: INTERVENTIONS:  -  Assess patient's ability to carry out ADLs; assess patient's baseline for ADL function and identify physical deficits which impact ability to perform ADLs (bathing, care of mouth/teeth, toileting, grooming, dressing, etc )  - Assess/evaluate cause of self-care deficits   - Assess range of motion  - Assess patient's mobility; develop plan if impaired  - Assess patient's need for assistive devices and provide as appropriate  - Encourage maximum independence but intervene and supervise when necessary  - Involve family in performance of ADLs  - Assess for home care needs following discharge   - Consider OT consult to assist with ADL evaluation and planning for discharge  - Provide patient education as appropriate  Outcome: Progressing  Goal: Maintains/Returns to pre admission functional level  Description: INTERVENTIONS:  - Perform BMAT or MOVE assessment daily    - Set and communicate daily mobility goal to care team and patient/family/caregiver  - Collaborate with rehabilitation services on mobility goals if consulted  - Perform Range of Motion 3 times a day  - Reposition patient every 2 hours    - Dangle patient 3 times a day  - Stand patient 3 times a day  - Ambulate patient 3 times a day  - Out of bed to chair 3 times a day   - Out of bed for meals 3 times a day  - Out of bed for toileting  - Record patient progress and toleration of activity level   Outcome: Progressing  Goal: Patient will remain free of falls  Description: INTERVENTIONS:  - Educate patient/family on patient safety including physical limitations  - Instruct patient to call for assistance with activity   - Consult OT/PT to assist with strengthening/mobility   - Keep Call bell within reach  - Keep bed low and locked with side rails adjusted as appropriate  - Keep care items and personal belongings within reach  - Initiate and maintain comfort rounds  - Make Fall Risk Sign visible to staff  - Offer Toileting every 2 Hours, in advance of need  - Initiate/Maintain bed alarm  - Obtain necessary fall risk management equipment: Alarm  - Apply yellow socks and bracelet for high fall risk patients  - Consider moving patient to room near nurses station  Outcome: Progressing     Problem: GENITOURINARY - ADULT  Goal: Maintains or returns to baseline urinary function  Description: INTERVENTIONS:  - Assess urinary function  - Encourage oral fluids to ensure adequate hydration if ordered  - Administer IV fluids as ordered to ensure adequate hydration  - Administer ordered medications as needed  - Offer frequent toileting  - Follow urinary retention protocol if ordered  Outcome: Progressing  Goal: Absence of urinary retention  Description: INTERVENTIONS:  - Assess patient’s ability to void and empty bladder  - Monitor I/O  - Bladder scan as needed  - Discuss with physician/AP medications to alleviate retention as needed  - Discuss catheterization for long term situations as appropriate  Outcome: Progressing  Goal: Urinary catheter remains patent  Description: INTERVENTIONS:  - Assess patency of urinary catheter  - If patient has a chronic wagner, consider changing catheter if non-functioning  - Follow guidelines for intermittent irrigation of non-functioning urinary catheter  Outcome: Progressing     Problem: HEMATOLOGIC - ADULT  Goal: Maintains hematologic stability  Description: INTERVENTIONS  - Assess for signs and symptoms of bleeding or hemorrhage  - Monitor labs  - Administer supportive blood products/factors as ordered and appropriate  Outcome: Progressing     Problem: MUSCULOSKELETAL - ADULT  Goal: Maintain or return mobility to safest level of function  Description: INTERVENTIONS:  - Assess patient's ability to carry out ADLs; assess patient's baseline for ADL function and identify physical deficits which impact ability to perform ADLs (bathing, care of mouth/teeth, toileting, grooming, dressing, etc )  - Assess/evaluate cause of self-care deficits   - Assess range of motion  - Assess patient's mobility  - Assess patient's need for assistive devices and provide as appropriate  - Encourage maximum independence but intervene and supervise when necessary  - Involve family in performance of ADLs  - Assess for home care needs following discharge   - Consider OT consult to assist with ADL evaluation and planning for discharge  - Provide patient education as appropriate  Outcome: Progressing  Goal: Maintain proper alignment of affected body part  Description: INTERVENTIONS:  - Support, maintain and protect limb and body alignment  - Provide patient/ family with appropriate education  Outcome: Progressing

## 2023-01-24 NOTE — TELEPHONE ENCOUNTER
Patient has an appointment with Dr Lalita Elliott on 2/27/23  Patient to be see by Dr Lalita Elliott prior to treatment  My chart message to family in regards to cancellation of infusion appointments prior to 2/27/23 appointment

## 2023-01-24 NOTE — CASE MANAGEMENT
Case Management Discharge Planning Note    Patient name María Torres  [facility] BEHAVIORAL 5 /E5 [id number] [address] MRN [id number]  : 1945 Date 2023       Current Admission Date: 2023  Current Admission Diagnosis:Bacteremia due to Enterobacter species   Patient Active Problem List    Diagnosis Date Noted   • Severe protein-calorie malnutrition ([facility] ) 2023   • History of pleural effusion 2023   • Nephrostomy status ([facility] ) 2023   • Acute on chronic blood loss anemia 2023   • Bacteremia due to Enterobacter species 2023   • Elevated brain natriuretic peptide (BNP) level 2023   • Ambulatory dysfunction 2023   • Influenza A 2023   • Elevated troponin 2023   • Right sided weakness 2022   • Stroke-like symptoms 2022   • Symptomatic hypotension 2022   • Insomnia 2022   • Right wrist drop 2022   • Chronic pleural effusion 2022   • UTI (urinary tract infection) 2022   • SOB (shortness of breath) 2022   • History of tobacco use disorder 2022   • Retroperitoneal lymphadenopathy 2022   • Pulmonary nodules 2022   • Syncope and collapse 2022   • Depression with suicidal ideation 2022   • Cancer related pain 2022   • Bilateral hydronephrosis 04/10/2022   • Stage 4 chronic kidney disease ([facility] ) 2022   • Fall 2022   • Hyperkalemia 2022   • Retained ureteral stent    • 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[facility] ) 2022   • Embolism and 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 ([facility] ) 2022   • Abnormal CT scan, bladder 2022   • Port-A-Cath in place 2022   • Continuous opioid dependence ([facility]  ) 2021   • Hematuria 10/24/2021   • Acute urinary retention 10/21/2021   • Chronic pain syndrome 2021   • Lumbar spondylosis    • Chronic bronchitis ([facility] ) 2021   • Moderate major depression, single episode ([facility] ) 2021   • Thrombocytopenia ([facility] ) 03/02/2021   • 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 03/02/2021   • Gross hematuria 02/09/2021   • PAD (peripheral artery disease) ([facility])    • Left carotid bruit    • Anemia of chronic disease 12/19/2020   • Preoperative clearance 12/19/2020   • Symptomatic anemia 10/10/2020   • Diabetic ulcer of left foot associated with type 2 diabetes mellitus, 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 ([facility] ) 10/08/2020   • Acute kidney injury superimposed on CKD ([facility] )    • Dysuria 08/17/2020   • Diabetic polyneuropathy associated with type 2 diabetes mellitus ([facility] ) 07/16/2020   • Abnormal MRI, lumbar spine 06/29/2020   • Malignant neoplasm of anterior wall of urinary bladder ([facility] )    • Secondary renal hyperparathyroidism ([facility] ) 02/12/2020   • Preop examination 04/16/2019   • Superficial phlebitis 03/07/2019   • Arteriosclerosis of artery of extremity ([facility] ) 02/22/2019   • Stable angina pectoris ([facility] ) 02/22/2019   • Herpes zoster without complication 27/33/6020   • Localized edema 02/22/2019   • Back pain 01/31/2019   • Degeneration of lumbar intervertebral disc 12/03/2018   • Primary osteoarthritis of left knee 03/16/2018   • Bladder carcinoma ([facility] ) 08/16/2016   • Presence of stent in coronary artery 08/16/2016   • Hypertension with renal disease 10/29/2015   • Hyperlipidemia 10/29/2015   • Cystitis due to intravesical BCG administration 09/24/2015   • Coronary artery disease of native artery of native heart with stable angina pectoris ([facility] ) 05/19/2011   • Type 2 diabetes mellitus, with long-term current use of insulin ([facility] ) 01/09/2004      LOS (days): 17  Geometric Mean LOS (GMLOS) (days): 5 10  Days to GMLOS:-11 4     OBJECTIVE:  Risk of Unplanned Readmission Score: 70 48         Current admission status: Inpatient   Preferred Pharmacy:   [address] [place] [address]   [address] [place] [address] Road [address] [place] [address] Howie  Phone: [phone] Fax: [place] [address], [place] - [address] [facility]  [address] [facility]  [address] [address]  Phone: [phone] Fax: [phone]    Primary Care Provider: Willie Sandoval MD    Primary Insurance: MEDICARE  Secondary Insurance: BLUE CROSS    DISCHARGE DETAILS:    Discharge planning discussed with[de-identified] Patient & grand daughter Nataliya Garcia of Choice: Yes  Comments - Freedom of Choice: Patient and grand daiughter Dejesús Breath are in agreement to d ischarge to [facility] acute  They did not want [address] nor [facility] in [facility] due to the distance  CM contacted family/caregiver?: Yes  Were Treatment Team discharge recommendations reviewed with patient/caregiver?: Yes  Did patient/caregiver verbalize understanding of patient care needs?: N/A- going to facility  Were patient/caregiver advised of the risks associated with not following Treatment Team discharge recommendations?: Yes    Contacts  Patient Contacts: Agusto Baca  Relationship to Patient[de-identified] Family  Contact Method: Phone  Phone Number: [phone]  Reason/Outcome: Continuity of Care, Referral, Emergency Contact, Discharge [address]         Is the patient interested in [address] at discharge?: No    Other Referral/Resources/Interventions Provided:  Interventions: Acute Rehab  Referral Comments: Discharging to [facility] acute rehab    Treatment Team Recommendation: Acute Rehab  Discharge Destination Plan[de-identified] Acute Rehab  Transport at Discharge : [facility]  Dispatcher Contacted: Yes  Number/Name of Dispatcher: [facility]- [phone]     ETA of Transport (Date): 01/24/23  ETA of Transport (Time): 1200     Transfer Mode: Wheelchair       Accepting Facility Name, [address] : [facility] Acute Rehab  Receiving Facility/Agency Phone Number: [phone] and the fax # is [phone]  Facility/Agency Fax Number: [phone] and the fax # is [phone]

## 2023-01-24 NOTE — ASSESSMENT & PLAN NOTE
· Patient was transferred from [address] [place] [address] due to fever and positive blood cultures  · Blood culture growing Enterobacter  · His Port-A-Cath was thought to be the source  · Port-A-Cath removed by interventional radiology  · Repeat blood cultures no growth at 72 hours  Repeat CT abdomen pelvis with no acute findings; redemonstrated bladder cancer with Shirley catheter insertion and stable metastatic disease in liver    To completed abx course on amoxicillin per ID

## 2023-01-24 NOTE — PLAN OF CARE
Problem: Nutrition/Hydration-ADULT  Goal: Nutrient/Hydration intake appropriate for improving, restoring or maintaining nutritional needs  Description: Monitor and assess patient's nutrition/hydration status for malnutrition  Collaborate with interdisciplinary team and initiate plan and interventions as ordered  Monitor patient's weight and dietary intake as ordered or per policy  Utilize nutrition screening tool and intervene as necessary  Determine patient's food preferences and provide high-protein, high-caloric foods as appropriate       INTERVENTIONS:  - Monitor oral intake, urinary output, labs, and treatment plans  - Assess nutrition and hydration status and recommend course of action  - Evaluate amount of meals eaten  - Assist patient with eating if necessary   - Allow adequate time for meals  - Recommend/ encourage appropriate diets, oral nutritional supplements, and vitamin/mineral supplements  - Order, calculate, and assess calorie counts as needed  - Recommend, monitor, and adjust tube feedings and TPN/PPN based on assessed needs  - Assess need for intravenous fluids  - Provide specific nutrition/hydration education as appropriate  - Include patient/family/caregiver in decisions related to nutrition  Outcome: Progressing     Problem: Prexisting or High Potential for Compromised Skin Integrity  Goal: Skin integrity is maintained or improved  Description: INTERVENTIONS:  - Identify patients at risk for skin breakdown  - Assess and monitor skin integrity  - Assess and monitor nutrition and hydration status  - Monitor labs   - Assess for incontinence   - Turn and reposition patient  - Assist with mobility/ambulation  - Relieve pressure over bony prominences  - Avoid friction and shearing  - Provide appropriate hygiene as needed including keeping skin clean and dry  - Evaluate need for skin moisturizer/barrier cream  - Collaborate with interdisciplinary team   - Patient/family teaching  - Consider wound care consult   Outcome: Progressing     Problem: MOBILITY - ADULT  Goal: Maintain or return to baseline ADL function  Description: INTERVENTIONS:  -  Assess patient's ability to carry out ADLs; assess patient's baseline for ADL function and identify physical deficits which impact ability to perform ADLs (bathing, care of mouth/teeth, toileting, grooming, dressing, etc )  - Assess/evaluate cause of self-care deficits   - Assess range of motion  - Assess patient's mobility; develop plan if impaired  - Assess patient's need for assistive devices and provide as appropriate  - Encourage maximum independence but intervene and supervise when necessary  - Involve family in performance of ADLs  - Assess for home care needs following discharge   - Consider OT consult to assist with ADL evaluation and planning for discharge  - Provide patient education as appropriate  Outcome: Progressing  Goal: Maintains/Returns to pre admission functional level  Description: INTERVENTIONS:  - Perform BMAT or MOVE assessment daily    - Set and communicate daily mobility goal to care team and patient/family/caregiver  - Collaborate with rehabilitation services on mobility goals if consulted  - Perform Range of Motion 3 times a day  - Reposition patient every 2 hours    - Dangle patient 3 times a day  - Stand patient 3 times a day  - Ambulate patient 3 times a day  - Out of bed to chair 3 times a day   - Out of bed for meals 3 times a day  - Out of bed for toileting  - Record patient progress and toleration of activity level   Outcome: Progressing     Problem: Potential for Falls  Goal: Patient will remain free of falls  Description: INTERVENTIONS:  - Educate patient/family on patient safety including physical limitations  - Instruct patient to call for assistance with activity   - Consult OT/PT to assist with strengthening/mobility   - Keep Call bell within reach  - Keep bed low and locked with side rails adjusted as appropriate  - Keep care items and personal belongings within reach  - Initiate and maintain comfort rounds  - Make Fall Risk Sign visible to staff  - Offer Toileting every 2 Hours, in advance of need  - Initiate/Maintain bed alarm  - Obtain necessary fall risk management equipment: Alarm  - Apply yellow socks and bracelet for high fall risk patients  - Consider moving patient to room near nurses station  Outcome: Progressing     Problem: PAIN - ADULT  Goal: Verbalizes/displays adequate comfort level or baseline comfort level  Description: Interventions:  - Encourage patient to monitor pain and request assistance  - Assess pain using appropriate pain scale  - Administer analgesics based on type and severity of pain and evaluate response  - Implement non-pharmacological measures as appropriate and evaluate response  - Consider cultural and social influences on pain and pain management  - Notify physician/advanced practitioner if interventions unsuccessful or patient reports new pain  Outcome: Progressing     Problem: INFECTION - ADULT  Goal: Absence or prevention of progression during hospitalization  Description: INTERVENTIONS:  - Assess and monitor for signs and symptoms of infection  - Monitor lab/diagnostic results  - Monitor all insertion sites, i e  indwelling lines, tubes, and drains  - Monitor endotracheal if appropriate and nasal secretions for changes in amount and color  - Manor appropriate cooling/warming therapies per order  - Administer medications as ordered  - Instruct and encourage patient and family to use good hand hygiene technique  - Identify and instruct in appropriate isolation precautions for identified infection/condition  Outcome: Progressing  Goal: Absence of fever/infection during neutropenic period  Description: INTERVENTIONS:  - Monitor WBC    Outcome: Progressing     Problem: SAFETY ADULT  Goal: Maintain or return to baseline ADL function  Description: INTERVENTIONS:  -  Assess patient's ability to carry out ADLs; assess patient's baseline for ADL function and identify physical deficits which impact ability to perform ADLs (bathing, care of mouth/teeth, toileting, grooming, dressing, etc )  - Assess/evaluate cause of self-care deficits   - Assess range of motion  - Assess patient's mobility; develop plan if impaired  - Assess patient's need for assistive devices and provide as appropriate  - Encourage maximum independence but intervene and supervise when necessary  - Involve family in performance of ADLs  - Assess for home care needs following discharge   - Consider OT consult to assist with ADL evaluation and planning for discharge  - Provide patient education as appropriate  Outcome: Progressing  Goal: Maintains/Returns to pre admission functional level  Description: INTERVENTIONS:  - Perform BMAT or MOVE assessment daily    - Set and communicate daily mobility goal to care team and patient/family/caregiver  - Collaborate with rehabilitation services on mobility goals if consulted  - Perform Range of Motion 3 times a day  - Reposition patient every 2 hours    - Dangle patient 3 times a day  - Stand patient 3 times a day  - Ambulate patient 3 times a day  - Out of bed to chair 3 times a day   - Out of bed for meals 3 times a day  - Out of bed for toileting  - Record patient progress and toleration of activity level   Outcome: Progressing  Goal: Patient will remain free of falls  Description: INTERVENTIONS:  - Educate patient/family on patient safety including physical limitations  - Instruct patient to call for assistance with activity   - Consult OT/PT to assist with strengthening/mobility   - Keep Call bell within reach  - Keep bed low and locked with side rails adjusted as appropriate  - Keep care items and personal belongings within reach  - Initiate and maintain comfort rounds  - Make Fall Risk Sign visible to staff  - Offer Toileting every 2 Hours, in advance of need  - Initiate/Maintain bed alarm  - Obtain necessary fall risk management equipment: Alarm  - Apply yellow socks and bracelet for high fall risk patients  - Consider moving patient to room near nurses station  Outcome: Progressing     Problem: GENITOURINARY - ADULT  Goal: Maintains or returns to baseline urinary function  Description: INTERVENTIONS:  - Assess urinary function  - Encourage oral fluids to ensure adequate hydration if ordered  - Administer IV fluids as ordered to ensure adequate hydration  - Administer ordered medications as needed  - Offer frequent toileting  - Follow urinary retention protocol if ordered  Outcome: Progressing  Goal: Absence of urinary retention  Description: INTERVENTIONS:  - Assess patient’s ability to void and empty bladder  - Monitor I/O  - Bladder scan as needed  - Discuss with physician/AP medications to alleviate retention as needed  - Discuss catheterization for long term situations as appropriate  Outcome: Progressing  Goal: Urinary catheter remains patent  Description: INTERVENTIONS:  - Assess patency of urinary catheter  - If patient has a chronic wagner, consider changing catheter if non-functioning  - Follow guidelines for intermittent irrigation of non-functioning urinary catheter  Outcome: Progressing     Problem: HEMATOLOGIC - ADULT  Goal: Maintains hematologic stability  Description: INTERVENTIONS  - Assess for signs and symptoms of bleeding or hemorrhage  - Monitor labs  - Administer supportive blood products/factors as ordered and appropriate  Outcome: Progressing     Problem: MUSCULOSKELETAL - ADULT  Goal: Maintain or return mobility to safest level of function  Description: INTERVENTIONS:  - Assess patient's ability to carry out ADLs; assess patient's baseline for ADL function and identify physical deficits which impact ability to perform ADLs (bathing, care of mouth/teeth, toileting, grooming, dressing, etc )  - Assess/evaluate cause of self-care deficits   - Assess range of motion  - Assess patient's mobility  - Assess patient's need for assistive devices and provide as appropriate  - Encourage maximum independence but intervene and supervise when necessary  - Involve family in performance of ADLs  - Assess for home care needs following discharge   - Consider OT consult to assist with ADL evaluation and planning for discharge  - Provide patient education as appropriate  Outcome: Progressing  Goal: Maintain proper alignment of affected body part  Description: INTERVENTIONS:  - Support, maintain and protect limb and body alignment  - Provide patient/ family with appropriate education  Outcome: Progressing

## 2023-01-24 NOTE — ASSESSMENT & PLAN NOTE
· History of hematuria due to bladder cancer  · S/p 2 units pRBC transfused  · H/h stable on discharge

## 2023-01-24 NOTE — TELEPHONE ENCOUNTER
Still admitted throughout and rales- scattered  Heart:  Heart regular rate and rhythm  Abdomen: Auscultation: Normal bowel sounds. No bruits.   Palpation: Tenderness: absent  Extremities: pulses present in all extremities  Musculoskeletal:  negative  Neurologic:  negative    CBC with Differential:  Lab Results   Component Value Date    WBC 20.3 08/10/2018    RBC 3.81 08/10/2018    HGB 11.0 08/10/2018    HCT 33.6 08/10/2018    HCT 46.5 02/09/2012     08/10/2018    PLT 90 02/09/2012    MCV 88.2 08/10/2018    MCH 28.9 08/10/2018    MCHC 32.7 08/10/2018    RDW 13.4 08/10/2018    LYMPHOPCT 3.3 08/09/2018    MONOPCT 1.6 08/09/2018    EOSPCT 0.8 02/09/2012    BASOPCT 0.1 08/09/2018    MONOSABS 0.20 08/09/2018    LYMPHSABS 0.4 08/09/2018    EOSABS 0.00 08/09/2018    BASOSABS 0.00 08/09/2018     CMP:  Lab Results   Component Value Date     08/10/2018     02/09/2012    K 4.7 08/10/2018    K 4.0 02/09/2012    CL 93 08/10/2018     02/09/2012    CO2 24 08/10/2018    BUN 16 08/10/2018    CREATININE <0.5 08/10/2018    CREATININE 0.6 02/09/2012    LABGLOM >60 08/10/2018    GLUCOSE 131 08/10/2018    PROT 7.3 08/07/2018    PROT 5.7 09/03/2012    LABALBU 4.1 08/07/2018    LABALBU 4.4 02/09/2012    CALCIUM 9.1 08/10/2018    BILITOT 0.6 08/07/2018    ALKPHOS 85 08/07/2018    ALKPHOS 85 02/09/2012    AST 19 08/07/2018    ALT 16 08/07/2018     Last 3 Troponin:  Lab Results   Component Value Date    TROPONINI <0.01 08/07/2018    TROPONINI 0.00 05/29/2017    TROPONINI <0.01 10/22/2016    TROPONINI 0.00 12/18/2015    TROPONINI <0.01 12/17/2015     Urine Culture:  No components found for: CURINE  Blood Culture:  No components found for: CBLOOD, CFUNGUSBL  Stool Culture:  No components found for: CSTOOL    Assessment:    Principal Problem:    Pneumonia  Active Problems:    Panlobular emphysema (HCC)    Hyperlipidemia    Hypertension    Nicotine dependence, cigarettes, uncomplicated    Abnormal weight loss  Resolved Problems:    * No

## 2023-01-24 NOTE — ASSESSMENT & PLAN NOTE
· Chronic bilateral nephrostomy tubes  · Prior to transfer patient reported abdominal pain, CT was done and shows: Stable bilateral percutaneous nephrostomy tubes with interval placement of a right double-J nephroureteral stent  Stable mild right upper pole caliectasis, with resolution of previously seen lower pole caliectasis and hydroureter

## 2023-01-25 ENCOUNTER — TELEPHONE (OUTPATIENT)
Dept: OTHER | Facility: OTHER | Age: 78
End: 2023-01-25

## 2023-01-25 NOTE — TELEPHONE ENCOUNTER
Lauren Otto, RN 2 minutes ago (9:09 AM)     MED Andrade, patient's granddaughter returning phone call from urology this morning   Noa Andrade states patient is currently in rehab but she will keep February appointment with urology

## 2023-01-25 NOTE — TELEPHONE ENCOUNTER
M for Doris his granddaughter to call office back regarding appt with Dr Morgan scheduled in Feb  Calling to confirm since patient was discharged yesterday

## 2023-01-26 ENCOUNTER — HOSPITAL ENCOUNTER (OUTPATIENT)
Dept: INFUSION CENTER | Facility: HOSPITAL | Age: 78
End: 2023-01-26

## 2023-01-31 ENCOUNTER — TELEPHONE (OUTPATIENT)
Dept: CARDIOLOGY CLINIC | Facility: CLINIC | Age: 78
End: 2023-01-31

## 2023-01-31 NOTE — TELEPHONE ENCOUNTER
Received call from Jose at [facility]  In reference to this patient with questions in reference to ASA and Dr Mauro Curling  Upon reviewing chart only see that we saw patient in consult on 1/4/23  Return call to her told her the same  Pt was under medicine when discharged

## 2023-02-13 ENCOUNTER — TRANSITIONAL CARE MANAGEMENT (OUTPATIENT)
Dept: INTERNAL MEDICINE CLINIC | Facility: CLINIC | Age: 78
End: 2023-02-13

## 2023-02-14 ENCOUNTER — TELEPHONE (OUTPATIENT)
Dept: HEMATOLOGY ONCOLOGY | Facility: CLINIC | Age: 78
End: 2023-02-14

## 2023-02-14 NOTE — TELEPHONE ENCOUNTER
Rec'd VM from pts granddaughter: " Hi, my name is Lloyd Huntjoao  I'm calling in regards to my grandfather  His name is Nadege Rocael  I was actually calling to speak to Ingrid Lilly  I thought that this was her direct number  He sees Doctor Olivia Sullivan  I had a question about a thoracic surgery referral for him to have his florex catheter removed and he was just discharged from [facility] on Sunday  If you could give me a call back, [phone], I would appreciate it  Thank you  Have a good day "    Returned call to Lloyd Walters who says Rosa Clemens was hospitalized for roughly 2 months and while in the hospital they were monitoring his pleurex drainage  Per Lloyd Walters there has been no drainage for almost 2 months and the catheter is starting to cause Emiliano pain  She would like to know if having cath removed is a possibility  Advised her I would reach out to Dr Shilpa Scott to discuss and get back to her

## 2023-02-15 DIAGNOSIS — J90 CHRONIC PLEURAL EFFUSION: Primary | ICD-10-CM

## 2023-02-16 ENCOUNTER — TELEPHONE (OUTPATIENT)
Dept: INTERNAL MEDICINE CLINIC | Facility: CLINIC | Age: 78
End: 2023-02-16

## 2023-02-16 NOTE — TELEPHONE ENCOUNTER
Doris (ANYA) left request for refills on the Rx line:  Metoprolol 25 mg, Alprazolam & Isosorbide 30 mg  As per the Discharge Summary from [facility], Metoprolol is 100 mg, Isosorbide is not on the list at all  I called the patient to verify the meds but he said he did not know what he is taking  I spoke to his wife and she said she was going by the D/C Summary from [facility] dated 1 24 23  She also said Mikal De La Rosa has not taken Alprazolam in over 2 months  I explained to Mrs Roly Islas that Mikal De La Rosa was discharged from [place] [address] on 1 2423 and admitted to [facility]  He was transferred from [facility] 2 7 23 and transferred to [facility]  Mikal De La Rosa should be taking the medications as per the discharge summary from [facility]  Mrs Roly Islas said she would have Doris call to speak with me  Doris called back and I explained that Mikal De La Rosa should be taking the meds as per the Discharge from [facility]  Doris said Mikal De La Rosa was given Metoprolol 25 mg by his cardiologist from [place] [address] prior to the admission to [facility] and she would continue to give it  The medication will be reviewed at the [address] [place] [address] [address] 12 22 23  I asked Doris to send BP readings and a med list with Mikal De La Rosa for his appt

## 2023-02-17 ENCOUNTER — TELEPHONE (OUTPATIENT)
Dept: NEUROLOGY | Facility: CLINIC | Age: 78
End: 2023-02-17

## 2023-02-17 NOTE — TELEPHONE ENCOUNTER
Called pt to confirm appt on 3/1 with Renard in [place]  Pt said he will have is daughter call back as he is unsure if he will be able to make it or not yet

## 2023-02-20 ENCOUNTER — PATIENT OUTREACH (OUTPATIENT)
Dept: CASE MANAGEMENT | Facility: HOSPITAL | Age: 78
End: 2023-02-20

## 2023-02-20 DIAGNOSIS — N18.4 STAGE 4 CHRONIC KIDNEY DISEASE (HCC): Primary | ICD-10-CM

## 2023-02-20 NOTE — PROGRESS NOTES
Outpatient Care Manager Note: Patient identified for SNF[facility]  BMP order placed and PCP notified  Chart review completed  Patient discharged from [place] [address] on 2/6/23 and to [facility] and discharged to home  on 2/12/23  Call made to home and left message

## 2023-02-21 ENCOUNTER — TELEMEDICINE (OUTPATIENT)
Dept: INTERNAL MEDICINE CLINIC | Facility: CLINIC | Age: 78
End: 2023-02-21

## 2023-02-21 DIAGNOSIS — I70.209 ARTERIOSCLEROSIS OF ARTERY OF EXTREMITY (HCC): ICD-10-CM

## 2023-02-21 DIAGNOSIS — N25.81 SECONDARY RENAL HYPERPARATHYROIDISM (HCC): ICD-10-CM

## 2023-02-21 DIAGNOSIS — Z95.5 PRESENCE OF STENT IN CORONARY ARTERY: ICD-10-CM

## 2023-02-21 DIAGNOSIS — I25.118 CORONARY ARTERY DISEASE OF NATIVE ARTERY OF NATIVE HEART WITH STABLE ANGINA PECTORIS (HCC): ICD-10-CM

## 2023-02-21 DIAGNOSIS — R55 SYNCOPE AND COLLAPSE: ICD-10-CM

## 2023-02-21 DIAGNOSIS — E11.42 DIABETIC POLYNEUROPATHY ASSOCIATED WITH TYPE 2 DIABETES MELLITUS (HCC): Primary | ICD-10-CM

## 2023-02-21 DIAGNOSIS — C67.9 BLADDER CARCINOMA (HCC): ICD-10-CM

## 2023-02-21 DIAGNOSIS — F32.1 MODERATE MAJOR DEPRESSION, SINGLE EPISODE (HCC): ICD-10-CM

## 2023-02-21 DIAGNOSIS — N18.4 STAGE 4 CHRONIC KIDNEY DISEASE (HCC): ICD-10-CM

## 2023-02-21 RX ORDER — OXYCODONE HYDROCHLORIDE 5 MG/1
5 CAPSULE ORAL EVERY 4 HOURS PRN
Status: ON HOLD | COMMUNITY

## 2023-02-21 RX ORDER — FUROSEMIDE 20 MG/1
20 TABLET ORAL 2 TIMES DAILY
Status: ON HOLD | COMMUNITY

## 2023-02-21 RX ORDER — OXYCODONE HYDROCHLORIDE 10 MG/1
10 TABLET ORAL EVERY 8 HOURS PRN
Status: ON HOLD | COMMUNITY

## 2023-02-21 RX ORDER — DOCUSATE SODIUM 100 MG/1
100 CAPSULE, LIQUID FILLED ORAL 2 TIMES DAILY
Status: ON HOLD | COMMUNITY

## 2023-02-21 NOTE — PROGRESS NOTES
Virtual TCM Visit:      Multiple admissions the last 1 with at [facility] after coming out of the rehab he had episodes of sepsis  He has nephrostomy tubes bilateral bilaterally  He has coronary artery disease no angina he lost a lot of weight looking at the video  He is try to get him to walk  With therapy when I call  I told him to do a blood pressure at least twice a day  To do orthostatic blood pressure at least once a day  Did check blood sugars and minimal before breakfast and dinner and preferably before she goes to bed      PET scan CAT scan demonstrated disease progression of his bladder cancer in the lung bladder and retroperitoneal nodes  He had a mini stroke with motor weakness in the right upper extremities  I reviewed the medications with the granddaughter  He takes no Lantus he is off all insulin  He takes Lasix every other day  He takes Xanax 0 25 as needed  Takes metoprolol succinate 25 mg tablets 2 daily  The furosemide has been changed to 20 mg every other day    Can you check with the endocrinologist for further follow-up    I told her most important to get the blood sugars done at least 3 times a day before breakfast and dinner and before he goes to bed    Labs review February 11, 2023  White count 15,000  Hemoglobin 8 6 hematocrit 26  Platelet count 248,497    Glucose 106  BUN 43 creatinine 2 6  Sodium 136 potassium 4 6 chloride 98 CO2 25    1 elevated blood sugars from February 9 in the hospital was 53    Chest x-ray at 2/7/2023 interval placement of the left pleural catheter due to a pleural effusion bibasilar (right opacity most likely pneumonia or atelectasis    He was admitted to the hospital here in January 7 to January 24  Bacteremia due to Enterobacter species  Further to be the source    "Port-A-Cath was removed by interventional radiologist   Blood culture repeated in 72 hours showed no growth  Repeat CAT scan of the abdomen and pelvis showed no acute findings demonstrated bladder cancer with metastatic disease to the liver    Anemia-2 units of packed cells transfused    Chronic bilateral nephrostomy tubes    Chronic pleural effusions    Gross hematuria    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 [address] )    Relevant Orders    CBC and differential    Comprehensive metabolic panel    TSH, 3rd generation with Free T4 reflex    Magnesium    Diabetic polyneuropathy associated with type 2 diabetes mellitus ([facility] [address] ) - Primary    Relevant Orders    CBC and differential    Comprehensive metabolic panel    TSH, 3rd generation with Free T4 reflex    Magnesium       Cardiovascular and Mediastinum    Coronary artery disease of native artery of native heart with stable angina pectoris (HCC)    Arteriosclerosis of artery of extremity ([facility] [address] )    Relevant Orders    CBC and differential    Comprehensive metabolic panel    TSH, 3rd generation with Free T4 reflex    Magnesium       Genitourinary    Bladder carcinoma (HCC)    Relevant Orders    CBC and differential    Comprehensive metabolic panel    TSH, 3rd generation with Free T4 reflex    Magnesium    Stage 4 chronic kidney disease (HCC)    Relevant Medications    furosemide (LASIX) 20 mg tablet    Other Relevant Orders    CBC and differential    Comprehensive metabolic panel    TSH, 3rd generation with Free T4 reflex    Magnesium       Other    Presence of stent in coronary artery    Moderate major depression, single episode ([facility] [address] )    Syncope and collapse        Reason for visit is weight of care multiple comorbidities    Encounter provider Garett Li MD     Provider located at [facility] [id number]    Recent Visits  Date Type Provider Dept   02/16/23 Telephone Essence [facility]roquin[facility] [place]   Showing recent visits within past 7 days and meeting all other requirements  Today's Visits  Date Type Provider Dept   02/21/23 Telemedicine Garett Li MD [facility] [place]   Showing today's visits and meeting all other requirements  Future Appointments  No visits were found meeting these conditions  Showing future appointments within next 150 days and meeting all other requirements       After connecting through televideo, the patient was identified by name and date of birth  Sagar Rolle was informed that this is a telemedicine visit and that the visit is being conducted through the [address] [employer] platform  He agrees to proceed     My office door was closed  No one else was in the room  He acknowledged consent and understanding of privacy and security of the video platform  The patient has agreed to participate and understands they can discontinue the visit at any time  Patient is aware this is a billable service  Transitional Care Management Review:  Sagar Rolle is a [de-identified] y o  male here for TCM follow up       During the TCM phone call patient stated:    TCM Call     Date and time call was made  2/13/2023 11:15 AM    Hospital care reviewed  Records reviewed    Patient was hospitialized at  Other (comment)    Ricardo Peoples    Date of Admission  02/06/23    Date of discharge  02/12/23    Diagnosis  ladder Cancer/hematuria    Disposition  Home    Were the patients medications reviewed and updated  Yes    Current Symptoms  None      TCM Call     Post hospital issues  None    Should patient be enrolled in anticoag monitoring? No    Scheduled for follow up? Yes    Not clinically warranted  Re-admitted to [facility]    Did you obtain your prescribed medications  Yes    Do you need help managing your prescriptions or medications  No    Is transportation to your appointment needed  No    I have advised the patient to call PCP with any new or worsening symptoms  [address] or [facility] other    Are you recieving any outpatient services  No    Are you recieving home care services  No    Are you using any community resources  No    Current waiver services  No    Have you fallen in the last 12 months  Yes    Interperter language line needed  No    Counseling  Patient        Subjective:     Patient ID: Alia Contreras is a [de-identified] y o  male  HPI  Review of Systems   Constitutional: Positive for fatigue and unexpected weight change  Respiratory: Negative for cough and chest tightness  Cardiovascular: Negative for chest pain and leg swelling  Neurological: Negative for weakness  Psychiatric/Behavioral: Negative for sleep disturbance  Objective: There were no vitals filed for this visit  Physical Exam  Constitutional:       General: He is not in acute distress  Appearance: He is ill-appearing  Comments: Chronically ill lost a lot of weight cachexia cancer patient   Cardiovascular:      Comments: No edema in the lower extremities  Blood pressure reported 130/70 with a heart rate of 84  Pulmonary:      Effort: No respiratory distress  Psychiatric:         Mood and Affect: Mood is depressed  Mood is not anxious  Behavior: Behavior is not agitated or slowed  Medications have been reviewed by provider in current encounter    I spent 35 minutes with the patient during this visit  Bree Rowe MD      VIRTUAL VISIT [address] [place] [address] verbally agrees to participate in [employer]   Pt is aware that Virtual Care Services could be limited without vital signs or the ability to perform a full hands-on physical exam  Emiliano Ye understands he or the provider may request at any time to terminate the video visit and request the patient to seek care or treatment in person

## 2023-02-21 NOTE — PROGRESS NOTES
Assessment/Plan:    No problem-specific Assessment & Plan notes found for this encounter  Diagnoses and all orders for this visit:    Diabetic polyneuropathy associated with type 2 diabetes mellitus ([facility] )    Secondary renal hyperparathyroidism ([place] [address] )    Arteriosclerosis of artery of extremity ([place] [address] )    Coronary artery disease of native artery of native heart with stable angina pectoris (HCC)    Bladder carcinoma ([place] [address] )    Stage 4 chronic kidney disease ([place] [address] )    Syncope and collapse    Presence of stent in coronary artery    Moderate major depression, single episode ([place] [address] )          Subjective:      Patient ID: Susan Hines is a [de-identified] y o  male  No chief complaint on file          Current Outpatient Medications:   •  Accu-Chek Softclix Lancets lancets, Test blood sugar 4 times daily, Disp: 200 each, Rfl: 0  •  acetaminophen (TYLENOL) 325 mg tablet, Take 650 mg by mouth every 6 (six) hours as needed for mild pain  , Disp: , Rfl:   •  ARIPiprazole (ABILIFY) 2 mg tablet, Take 2 mg by mouth daily, Disp: , Rfl:   •  aspirin (ECOTRIN LOW STRENGTH) 81 mg EC tablet, Take 1 tablet (81 mg total) by mouth daily, Disp: , Rfl:   •  Azelastine HCl 0 15 % SOLN, Inhale 1 spray 2 (two) times a day, Disp: 30 mL, Rfl: 3  •  Bimatoprost (LUMIGAN OP), Apply 1 drop to eye daily at bedtime , Disp: , Rfl:   •  calcitriol (ROCALTROL) 0 25 mcg capsule, Take 1 capsule (0 25 mcg total) by mouth daily, Disp: 90 capsule, Rfl: 0  •  cyanocobalamin (VITAMIN B-12) 1000 MCG tablet, Take 1 tablet (1,000 mcg total) by mouth daily, Disp: 30 tablet, Rfl: 2  •  DULoxetine (CYMBALTA) 30 mg delayed release capsule, TAKE ONE CAPSULE BY MOUTH EVERY DAY, Disp: 90 capsule, Rfl: 0  •  ezetimibe (ZETIA) 10 mg tablet, Take 1 tablet (10 mg total) by mouth daily, Disp: 90 tablet, Rfl: 1  •  Ferrous Sulfate Dried (Feosol) 200 (65 Fe) MG TABS, Take 65 mg by mouth daily, Disp: 90 tablet, Rfl: 0  •  fluticasone (FLONASE) 50 mcg/act nasal spray, 1 spray into each nostril as needed for allergies, Disp: , Rfl:   •  furosemide (LASIX) 20 mg tablet, Take 1 tablet (20 mg total) by mouth daily, Disp: 90 tablet, Rfl: 1  •  gabapentin (NEURONTIN) 300 mg capsule, TAKE ONE CAPSULE BY MOUTH EVERY DAY AT BEDTIME, Disp: 90 capsule, Rfl: 0  •  Insulin Pen Needle 31G X 8 MM MISC, Inject 3 times a day, Disp: 100 each, Rfl: 2  •  Lantus SoloStar 100 units/mL injection pen, 18 units qhs (Patient taking differently: Inject 18 Units under the skin daily at bedtime), Disp: 30 mL, Rfl: 1  •  loperamide (IMODIUM) 2 mg capsule, Take 2 mg by mouth 4 (four) times a day as needed for diarrhea, Disp: , Rfl:   •  metoprolol tartrate (LOPRESSOR) 25 mg tablet, Take 1 tablet (25 mg total) by mouth every 12 (twelve) hours, Disp: 60 tablet, Rfl: 0  •  multivitamin (THERAGRAN) TABS, Take 1 tablet by mouth daily  , Disp: , Rfl:   •  omeprazole (PriLOSEC) 20 mg delayed release capsule, Take 1 capsule (20 mg total) by mouth daily in the early morning PRN, Disp: 90 capsule, Rfl: 0  •  oxybutynin (DITROPAN) 5 mg tablet, Take 1 tablet (5 mg total) by mouth 3 (three) times a day, Disp: , Rfl: 0  •  polyethylene glycol (MIRALAX) 17 g packet, Take 17 g by mouth daily, Disp: 30 each, Rfl: 0  •  senna (SENOKOT) 8 6 MG tablet, Take 2 tablets (17 2 mg total) by mouth 2 (two) times a day (Patient not taking: Reported on 1/7/2023), Disp: 90 tablet, Rfl: 0    HPI    The following portions of the patient's history were reviewed and updated as appropriate: allergies, current medications, past family history, past medical history, past social history, past surgical history and problem list     Review of Systems      Objective: There were no vitals taken for this visit       Physical Exam

## 2023-02-22 ENCOUNTER — PATIENT OUTREACH (OUTPATIENT)
Dept: CASE MANAGEMENT | Facility: HOSPITAL | Age: 78
End: 2023-02-22

## 2023-02-22 ENCOUNTER — TELEPHONE (OUTPATIENT)
Dept: INTERNAL MEDICINE CLINIC | Facility: CLINIC | Age: 78
End: 2023-02-22

## 2023-02-22 DIAGNOSIS — N30.90 CYSTITIS: Primary | ICD-10-CM

## 2023-02-22 NOTE — TELEPHONE ENCOUNTER
Maryuri Muniz from [facility] calling and stated that patient has had increase in urine frequency and burning  The granddaughter forgot to mention this to Dr Brianna Montoya at [facility]  She is asking for the okay to check a Urinalysis and Urine Culture      I told her it would be okay to do and she will fax over the order to our office

## 2023-02-22 NOTE — PROGRESS NOTES
Outpatient Care Manager Note: Received call back from patient and GARY pathway interventions reviewed  including:  • PCP visit within 1 week- saw yesterday  • Avoiding NSAIDs  • Adequate nutrition and hydration  • Keeping BP >130 if normal BP not lower  • Checking temperature  • Assessing for weight gain or loss and edema changes since home  • Medication Review- done by PCP and home care  • BMP- has CMP along with other labs ordered by PCP yesterday  Reminded to get  Patient states doing OK at home and has good family support  He states he is building back his strength slowly  Ha RN and PT services through [facility]  Will follow

## 2023-02-23 ENCOUNTER — OFFICE VISIT (OUTPATIENT)
Dept: PULMONOLOGY | Facility: CLINIC | Age: 78
End: 2023-02-23

## 2023-02-23 VITALS
WEIGHT: 210 LBS | TEMPERATURE: 97 F | SYSTOLIC BLOOD PRESSURE: 112 MMHG | HEIGHT: 76 IN | OXYGEN SATURATION: 98 % | BODY MASS INDEX: 25.57 KG/M2 | HEART RATE: 97 BPM | DIASTOLIC BLOOD PRESSURE: 80 MMHG | RESPIRATION RATE: 18 BRPM

## 2023-02-23 DIAGNOSIS — J90 CHRONIC PLEURAL EFFUSION: Primary | ICD-10-CM

## 2023-02-23 DIAGNOSIS — C67.3 MALIGNANT NEOPLASM OF ANTERIOR WALL OF URINARY BLADDER (HCC): ICD-10-CM

## 2023-02-23 NOTE — PROGRESS NOTES
Pulmonary Follow Up Note   Manuel Rodgers [de-identified] y o  male MRN: [id number]  2/23/2023      Assessment:  1  Recurrent exudative lymphocytic pleural effusion with left TPC in place  · No significant effusion on images reported or exam  · TPC no longer draining x multiple attempts  · TPC removed as listed below    2  Extensive bladder cancer - per oncology    Advised no further follow up is needed  To call should pain or dyspnea develop    Plan:    Diagnoses and all orders for this visit:    Chronic pleural effusion    Malignant neoplasm of anterior wall of urinary bladder ([address] )        Return if symptoms worsen or fail to improve  History of Present Illness   HPI:  Manuel Rodgers is a [de-identified] y o  male who extensive bladder cancer with abdominal adenopathy with ureteral obstruction requiring b/l perc nephrostomy tube drainage and suspected pulmonary metastasis, CAD, DM2, JONI, GERD, and HTN  In September 2022 he had left TPC placed for recurrent exudative lymphocytic pleural effusion - presumed malignant pleural effusion  He presents today to have TPC removed  Per his grand-daughter (discussed telephonically) and his wife they have not had any drainage in nearly 2 months  Previous to that, drainage frequency was weekly and amount decreasing  He notes discomfort with catheter in place  He has had multiple admissions to include admission at [facility]  These were reviewed and include influenza A infection and enteroccocus bacteremia  Upon review of imaging, left pleural effusion has remained very small  Review of Systems   Constitutional: Positive for activity change and fatigue  Respiratory: Negative for cough, chest tightness and shortness of breath  Cardiovascular: Positive for chest pain  Gastrointestinal: Negative for abdominal pain, nausea and vomiting  Musculoskeletal: Positive for arthralgias and gait problem  Allergic/Immunologic: Positive for immunocompromised state     Neurological: Positive for weakness  Negative for syncope  Hematological: Negative for adenopathy  Psychiatric/Behavioral: Positive for sleep disturbance  Historical Information   Past Medical History:   Diagnosis Date   • Anemia     Last assessed: 9/28/17   • Anxiety    • Arteriosclerotic cardiovascular disease     Last assessed: 9/28/17   • Arthritis    • Bladder cancer ([facility] [place] [facility] )     bladder- had BCG treatments   • Chronic kidney disease     Stage IV   • CKD (chronic kidney disease) stage 4, GFR 15-29 ml/min ([facility])    • Colon polyp    • Coronary artery disease     7 stents   • Depression    • Diabetes mellitus ([facility])     IDDM   • GERD (gastroesophageal reflux disease)    • Glaucoma    • Hematuria    • History of fusion of cervical spine    • Hyperlipidemia    • Hypertension    • Insomnia     Last assessed: 11/14/12   • Loss of hearing     has hearing aids but usually does not wear them   • Lung cancer ([facility] [place] [facility] )    • Metastatic cancer ([facility] [place] [facility] )    • Other seasonal allergic rhinitis     Last assessed: 2/10/16   • PAD (peripheral artery disease) ([facility])    • Shortness of breath     on exertion   • Spinal stenosis of lumbar region    • Transient cerebral ischemia     No Residual   • Uses walker     w/c for longer distances     Past Surgical History:   Procedure Laterality Date   • CARDIAC SURGERY      Cath stent placement  Last assessed: 3/9/17  Interventional Catheterization   • CHOLECYSTECTOMY     • COLONOSCOPY     • CYSTOSCOPY      Diagnostic w/biopsy  Tana Walters  Last assessed: 12/1/14   • CYSTOSCOPY N/A 04/12/2022    Procedure: CYSTOSCOPY  Bladder biopsies  ;  Surgeon: Mabelene Kocher, MD;  [facility];  Service: Urology   • CYSTOSCOPY W/ RETROGRADES Right 03/01/2022    Procedure: CYSTO; stent removal retrograde;  Surgeon: Mabelene Kocher, MD;  [facility];  Service: Urology   • CYSTOSCOPY W/ URETERAL STENT PLACEMENT Bilateral 10/18/2021    Procedure: bilateral retrogrades, cytology collection;  Surgeon: Mabelene Kocher, MD; [facility];  Service: Urology   • CYSTOURETHROSCOPY      w/cautery  Erling Mage   • FL RETROGRADE PYELOGRAM  10/18/2021   • FL RETROGRADE PYELOGRAM  10/24/2021   • FL RETROGRADE PYELOGRAM  12/14/2022   • GASTRIC BYPASS      For morbid obesity w/Shaji-en-Y   Resolved: 11/17/09   • INCISION AND DRAINAGE OF WOUND Right 02/26/2017    Procedure: INCISION AND DRAINAGE (I&D) EXTREMITY WITH APPLICATION OF GRAFT JACKET;  Surgeon: Donnell Roy DPM;  [facility];  Service:    • INCISION AND DRAINAGE OF WOUND Right 04/25/2017    Procedure: INCISION AND DRAINAGE (I&D) EXTREMITY, APPLICATION OF GRAFT;  Surgeon: Donnell Roy DPM;  [facility];  Service:    • IR BIOPSY OTHER  07/02/2020   • IR LOWER EXTREMITY ANGIOGRAM  02/08/2021   • IR LOWER EXTREMITY ANGIOGRAM  02/11/2021   • IR NEPHROSTOMY TUBE CHECK/CHANGE/REPOSITION/REINSERTION/UPSIZE  04/28/2022   • IR NEPHROSTOMY TUBE CHECK/CHANGE/REPOSITION/REINSERTION/UPSIZE  05/24/2022   • IR NEPHROSTOMY TUBE CHECK/CHANGE/REPOSITION/REINSERTION/UPSIZE  06/07/2022   • IR NEPHROSTOMY TUBE CHECK/CHANGE/REPOSITION/REINSERTION/UPSIZE  07/28/2022   • IR NEPHROSTOMY TUBE CHECK/CHANGE/REPOSITION/REINSERTION/UPSIZE  11/15/2022   • IR NEPHROSTOMY TUBE CHECK/CHANGE/REPOSITION/REINSERTION/UPSIZE  1/10/2023   • IR NEPHROSTOMY TUBE PLACEMENT  02/25/2022   • IR PORT PLACEMENT  01/17/2022   • IR PORT REMOVAL  1/10/2023   • IR THORACENTESIS  09/02/2022   • IR THORACENTESIS  09/14/2022   • IR THORACENTESIS WITH TUBE PLACEMENT Left     october   • IR TUNNELED CENTRAL LINE PLACEMENT  12/24/2020   • JOINT REPLACEMENT      christofer knees replaced   • SD AMPUTATION METATARSAL W/TOE SINGLE Left 12/21/2020    Procedure: RAY RESECTION FOOT;  Surgeon: Claudeen Atkinson, DPM;  [facility];  Service: Podiatry   • SD AMPUTATION METATARSAL W/TOE SINGLE Left 12/31/2020    Procedure: 5TH MET RESECTION;  Surgeon: Claudeen Atkinson, DPM;  [facility];  Service: Podiatry • SC CYSTO BLADDER W/URETERAL CATHETERIZATION Right 12/14/2022    Procedure: CYSTOSCOPY WITH RETROGRADE PYELOGRAM and CLOT EVACUATION, RIGHT STENT INSERTION, FULGRATION OF BLEEDING POINTS;  Surgeon: Dru Garg MD;  [facility];  Service: Urology   • SC CYSTO W/IRRIG & EVAC MULTPLE OBSTRUCTING CLOTS N/A 02/10/2021    Procedure: CYSTOSCOPY EVACUATION OF CLOTS, fulguration;  Surgeon: Mary Gutiérrez MD;  [facility];  Service: Urology   • SC CYSTO W/IRRIG & EVAC MULTPLE OBSTRUCTING CLOTS N/A 10/24/2021    Procedure: CYSTOSCOPY EVACUATION OF CLOT, fulguration of bleeding vessels, right ureter stent placement, retrograde pyelogram;  Surgeon: Ruth Miller MD;  [facility];  Service: Urology   • SC CYSTO W/REMOVAL OF LESIONS SMALL N/A 11/19/2020    Procedure: CYSTO W/BIOPSIES, transurethral prostate bx;  Surgeon: Hansel Woo MD;  [facility];  Service: Urology   • SC CYSTOURETHROSCOPY W/DEST &/RMVL TUMOR LARGE Bilateral 10/18/2021    Procedure: TRANSURETHRAL RESECTION OF BLADDER TUMOR (TURBT);   Surgeon: India Tee MD;  [facility];  Service: Urology   • SC CYSTOURETHROSCOPY WITH BIOPSY N/A 08/16/2016    Procedure: Kennedy Harada;  Surgeon: Kelli Bonner MD;  [facility];  Service: Urology   • SC Jocy Self 3RD+ ORD [address] PEL/LXTR [facility] Left 02/08/2021    Procedure: LEG angiogram, CO2 w/limited contrast with balloon angioplasty postertior tibial artery;  Surgeon: Julita Yo MD;  [facility];  Service: Vascular   • ROTATOR CUFF REPAIR     • SMALL INTESTINE SURGERY      Surgery Shaji-en-Y   • SPINAL FUSION      lumbar and cervical fusions   • VAC DRESSING APPLICATION Right 70/52/3903    Procedure: APPLICATION VAC DRESSING;  Surgeon: Stoney Hall DPM;  [facility];  Service:    • WOUND DEBRIDEMENT Left 02/16/2021    Procedure: FOOT DEBRIDE, [address] OUT w/graft application;  Surgeon: Stoney Hall DPM; Location: [facility] OR;  Service: Podiatry     Family History   Problem Relation Age of Onset   • Diabetes Mother    • Heart disease Mother    • Other Mother         High blood pressure   • Heart disease Father    • Diabetes Sister    • Other Sister         High blood pressure   • Kidney disease Sister    • Heart disease Brother    • Other Brother         High blood pressure         Meds/Allergies     Current Outpatient Medications:   •  Accu-Chek Softclix Lancets lancets, Test blood sugar 4 times daily, Disp: 200 each, Rfl: 0  •  acetaminophen (TYLENOL) 325 mg tablet, Take 650 mg by mouth every 6 (six) hours as needed for mild pain  , Disp: , Rfl:   •  ARIPiprazole (ABILIFY) 2 mg tablet, Take 2 mg by mouth daily, Disp: , Rfl:   •  aspirin (ECOTRIN LOW STRENGTH) 81 mg EC tablet, Take 1 tablet (81 mg total) by mouth daily, Disp: , Rfl:   •  Azelastine HCl 0 15 % SOLN, Inhale 1 spray 2 (two) times a day, Disp: 30 mL, Rfl: 3  •  Bimatoprost (LUMIGAN OP), Apply 1 drop to eye daily at bedtime , Disp: , Rfl:   •  calcitriol (ROCALTROL) 0 25 mcg capsule, Take 1 capsule (0 25 mcg total) by mouth daily, Disp: 90 capsule, Rfl: 0  •  docusate sodium (COLACE) 100 mg capsule, Take 100 mg by mouth 2 (two) times a day, Disp: , Rfl:   •  DULoxetine (CYMBALTA) 30 mg delayed release capsule, TAKE ONE CAPSULE BY MOUTH EVERY DAY, Disp: 90 capsule, Rfl: 0  •  ezetimibe (ZETIA) 10 mg tablet, Take 1 tablet (10 mg total) by mouth daily, Disp: 90 tablet, Rfl: 1  •  fluticasone (FLONASE) 50 mcg/act nasal spray, 1 spray into each nostril as needed for allergies, Disp: , Rfl:   •  furosemide (LASIX) 20 mg tablet, Take 1 tablet (20 mg total) by mouth daily, Disp: 90 tablet, Rfl: 1  •  gabapentin (NEURONTIN) 300 mg capsule, TAKE ONE CAPSULE BY MOUTH EVERY DAY AT BEDTIME, Disp: 90 capsule, Rfl: 0  •  Insulin Pen Needle 31G X 8 MM MISC, Inject 3 times a day, Disp: 100 each, Rfl: 2  •  loperamide (IMODIUM) 2 mg capsule, Take 2 mg by mouth 4 (four) times a day as needed for diarrhea, Disp: , Rfl:   •  metoprolol tartrate (LOPRESSOR) 25 mg tablet, Take 1 tablet (25 mg total) by mouth every 12 (twelve) hours, Disp: 60 tablet, Rfl: 0  •  multivitamin (THERAGRAN) TABS, Take 1 tablet by mouth daily  , Disp: , Rfl:   •  omeprazole (PriLOSEC) 20 mg delayed release capsule, Take 1 capsule (20 mg total) by mouth daily in the early morning PRN, Disp: 90 capsule, Rfl: 0  •  oxybutynin (DITROPAN) 5 mg tablet, Take 1 tablet (5 mg total) by mouth 3 (three) times a day, Disp: , Rfl: 0  •  oxyCODONE (OXY-IR) 5 MG capsule, Take 5 mg by mouth every 4 (four) hours as needed for moderate pain, Disp: , Rfl:   •  oxyCODONE (ROXICODONE) 10 MG TABS, Take 10 mg by mouth every 8 (eight) hours as needed for moderate pain, Disp: , Rfl:   •  polyethylene glycol (MIRALAX) 17 g packet, Take 17 g by mouth daily, Disp: 30 each, Rfl: 0  •  senna (SENOKOT) 8 6 MG tablet, Take 2 tablets (17 2 mg total) by mouth 2 (two) times a day, Disp: 90 tablet, Rfl: 0  •  cyanocobalamin (VITAMIN B-12) 1000 MCG tablet, Take 1 tablet (1,000 mcg total) by mouth daily (Patient not taking: Reported on 2/23/2023), Disp: 30 tablet, Rfl: 2  •  Ferrous Sulfate Dried (Feosol) 200 (65 Fe) MG TABS, Take 65 mg by mouth daily (Patient not taking: Reported on 2/23/2023), Disp: 90 tablet, Rfl: 0  •  furosemide (LASIX) 20 mg tablet, Take 20 mg by mouth 2 (two) times a day Take 1 tablet every other day (Patient not taking: Reported on 2/23/2023), Disp: , Rfl:   Allergies   Allergen Reactions   • Atorvastatin Hives, Itching and Rash   • Simvastatin Rash and Edema     Edema of lower legs   • Statins Hives and Itching   • Insulin Lispro Swelling and Edema     " Lower Legs"   • Other Itching, Rash and Other (See Comments)     "EKG Patches"   "blue EKG patches"       Vitals: Blood pressure 112/80, pulse 97, temperature (!) 97 °F (36 1 °C), temperature source Tympanic, resp   rate 18, height 6' 4" (1 93 m), weight 95 3 kg (210 lb), SpO2 98 %  Body mass index is 25 56 kg/m²  Oxygen Therapy  SpO2: 98 %  Oxygen Therapy: None (Room air)      Physical Exam  Physical Exam  Vitals reviewed  Constitutional:       General: He is not in acute distress  Appearance: He is well-developed and normal weight  He is not ill-appearing, toxic-appearing or diaphoretic  Comments: Frail older man in wheelchair   HENT:      Head: Normocephalic and atraumatic  Right Ear: External ear normal       Left Ear: External ear normal       Nose: Nose normal       Mouth/Throat:      Mouth: Mucous membranes are moist       Pharynx: Oropharynx is clear  No oropharyngeal exudate  Eyes:      General: No scleral icterus  Right eye: No discharge  Left eye: No discharge  Conjunctiva/sclera: Conjunctivae normal       Pupils: Pupils are equal, round, and reactive to light  Neck:      Vascular: No JVD  Trachea: No tracheal deviation  Cardiovascular:      Rate and Rhythm: Normal rate and regular rhythm  Heart sounds: Normal heart sounds  No murmur heard  Pulmonary:      Effort: Pulmonary effort is normal  No respiratory distress  Breath sounds: No stridor  No wheezing, rhonchi or rales  Comments: Very slightly diminished BS at left base, left TPC in place w/o proper dressing but no evidence of infection or purulent drainage  Abdominal:      General: Bowel sounds are normal  There is no distension  Palpations: Abdomen is soft  Tenderness: There is no abdominal tenderness  There is no guarding  Comments: Bilateral PCN tubes in place   Musculoskeletal:         General: No swelling or deformity  Cervical back: Neck supple  Right lower leg: No edema  Left lower leg: No edema  Lymphadenopathy:      Cervical: No cervical adenopathy  Skin:     General: Skin is warm and dry  Findings: No rash  Neurological:      Mental Status: He is alert and oriented to person, place, and time     Psychiatric: Mood and Affect: Mood normal          Behavior: Behavior normal          Thought Content: Thought content normal          Labs: I have personally reviewed pertinent lab results  Lab Results   Component Value Date    WBC 13 84 (H) 01/24/2023    HGB 8 2 (L) 01/24/2023    HCT 25 9 (L) 01/24/2023    MCV 93 01/24/2023     01/24/2023     Lab Results   Component Value Date    GLUCOSE 203 (H) 09/03/2015    CALCIUM 9 3 01/24/2023     09/03/2015    K 4 2 01/24/2023    CO2 27 01/24/2023     01/24/2023    BUN 57 (H) 01/24/2023    CREATININE 2 96 (H) 01/24/2023     No results found for: IGE  Lab Results   Component Value Date    ALT 29 01/24/2023    AST 21 01/24/2023    GGT 57 07/28/2021    ALKPHOS 94 01/24/2023    BILITOT 0 50 07/26/2015       Imaging and other studies: I have personally reviewed pertinent reports  and I have personally reviewed pertinent films in PACS  CXR 2/7/2023 left basilar opacity, no reported effusion or PTX, left TPC in place    CT abd/pelvis 1/19/2023 - left TPC in place with small loculated effusion      Procedure Report - Tunneled pleural catheter removal  Verbal consent obtained by patient and his wife for TPC removal   Region prepped and draped in sterile fashion  8cc 1% lidocaine was infiltrated around the TPC insertion site and adequate analgesia achieved, then using suture removal scissors and mosquito forceps blunt dissection of the TPC cuff was achieved and the TPC was easily removed  No significant bleeding occurred during the procedure and bandage was applied  EBL 3cc, he tolerated procedure well and denied dyspnea or pain  Bilateral BS noted after procedure, stable slightly diminished BS at left base  Advised to keep dressing in place x 24hrs then can change to routine band aide until fully healed  Salvador Soria DO, [facility]

## 2023-02-24 ENCOUNTER — TELEPHONE (OUTPATIENT)
Dept: HEMATOLOGY ONCOLOGY | Facility: CLINIC | Age: 78
End: 2023-02-24

## 2023-02-24 NOTE — TELEPHONE ENCOUNTER
Received vm from patient," Hello, my name is Estrellita Corbett date of birth is [id number]  My phone number is [phone]  My doctor's name is Doctor Yue Hu "      Call out to patient  Left vm reviewed patient has an appointment on 2/27/23  Reviewed RN teams number and hours of operation to further discuss

## 2023-03-01 ENCOUNTER — HOSPITAL ENCOUNTER (OUTPATIENT)
Dept: NUCLEAR MEDICINE | Facility: HOSPITAL | Age: 78
Discharge: HOME | End: 2023-03-01

## 2023-03-01 ENCOUNTER — TELEPHONE (OUTPATIENT)
Dept: PULMONOLOGY | Facility: CLINIC | Age: 78
End: 2023-03-01

## 2023-03-01 DIAGNOSIS — C67.0 MALIGNANT NEOPLASM OF TRIGONE OF BLADDER (HCC): ICD-10-CM

## 2023-03-01 LAB — GLUCOSE SERPL-MCNC: 154 MG/DL (ref 65–140)

## 2023-03-01 NOTE — TELEPHONE ENCOUNTER
Patients wife calling stating that after removing the tube from his lungs he is still in pain and is swollen under the armpit by the incision  She also states that he has a PET scan today  She would like a call back to see what she can do for him and if this is normal  Please advise

## 2023-03-01 NOTE — TELEPHONE ENCOUNTER
I called to update family  I obtained voicemail  I left a message advising I do not see any significant abnormality at the prior [facility] site on CT scan  I advised there does appear to be disease progression on CT and they should discuss further with his oncologist  I advised that if pain persists, to call office tomorrow for visit and repeat evaluation       Felipe Soriano, DO

## 2023-03-02 ENCOUNTER — TELEPHONE (OUTPATIENT)
Dept: INTERNAL MEDICINE CLINIC | Facility: CLINIC | Age: 78
End: 2023-03-02

## 2023-03-02 DIAGNOSIS — C67.9 BLADDER CARCINOMA (HCC): ICD-10-CM

## 2023-03-02 DIAGNOSIS — N30.90 CYSTITIS: Primary | ICD-10-CM

## 2023-03-02 DIAGNOSIS — Z79.4 TYPE 2 DIABETES MELLITUS WITH CHRONIC KIDNEY DISEASE, WITH LONG-TERM CURRENT USE OF INSULIN, UNSPECIFIED CKD STAGE (HCC): ICD-10-CM

## 2023-03-02 DIAGNOSIS — N17.9 ACUTE KIDNEY INJURY SUPERIMPOSED ON CKD (HCC): ICD-10-CM

## 2023-03-02 DIAGNOSIS — N39.0 URINARY TRACT INFECTION WITHOUT HEMATURIA, SITE UNSPECIFIED: Primary | ICD-10-CM

## 2023-03-02 DIAGNOSIS — E11.22 TYPE 2 DIABETES MELLITUS WITH CHRONIC KIDNEY DISEASE, WITH LONG-TERM CURRENT USE OF INSULIN, UNSPECIFIED CKD STAGE (HCC): ICD-10-CM

## 2023-03-02 DIAGNOSIS — N18.9 ACUTE KIDNEY INJURY SUPERIMPOSED ON CKD (HCC): ICD-10-CM

## 2023-03-02 RX ORDER — CIPROFLOXACIN 500 MG/1
500 TABLET, FILM COATED ORAL EVERY 12 HOURS SCHEDULED
Qty: 10 TABLET | Refills: 0 | Status: CANCELLED | OUTPATIENT
Start: 2023-03-02 | End: 2023-03-07

## 2023-03-02 NOTE — PROGRESS NOTES
I called Linnea Whipple  is symptomatic with urinary tract infection he was recently in the hospital with urosepsis  He has a vancomycin resistant enterococci in the urine which I believe could only be treated with IV antibiotics he will need an ID consult urology consult and admission    I told him to go to to the emergency room he had no transportation so I told him to call 911 and have the ambulance take him to the hospital [address]    I will notify the ER

## 2023-03-02 NOTE — TELEPHONE ENCOUNTER
I spoke with Sophia Leyden and gave result  He said he felt like he has a UTI and wanted the Cipro sent to [facility]

## 2023-03-02 NOTE — TELEPHONE ENCOUNTER
----- Message from Judi Moran MD sent at 3/2/2023  7:05 AM EST -----  Please call the patient regarding his abnormal result    Urine culture showed vancomycin resistant enterococci he may be colonized with it so call him if he has any symptoms call the lab to see if there is any other sensitivities to the bug and send report to urology and nephrology for further evaluation

## 2023-03-03 ENCOUNTER — HOSPITAL ENCOUNTER (INPATIENT)
Facility: HOSPITAL | Age: 78
LOS: 10 days | Discharge: HOSPICE | End: 2023-03-13
Attending: EMERGENCY MEDICINE | Admitting: INTERNAL MEDICINE

## 2023-03-03 ENCOUNTER — TELEPHONE (OUTPATIENT)
Dept: INTERNAL MEDICINE CLINIC | Facility: CLINIC | Age: 78
End: 2023-03-03

## 2023-03-03 DIAGNOSIS — E87.1 HYPONATREMIA: ICD-10-CM

## 2023-03-03 DIAGNOSIS — A41.9 SEPSIS (HCC): ICD-10-CM

## 2023-03-03 DIAGNOSIS — G89.3 CANCER RELATED PAIN: ICD-10-CM

## 2023-03-03 DIAGNOSIS — N13.30 BILATERAL HYDRONEPHROSIS: ICD-10-CM

## 2023-03-03 DIAGNOSIS — C67.9 BLADDER CARCINOMA (HCC): ICD-10-CM

## 2023-03-03 DIAGNOSIS — Z87.09 HISTORY OF PLEURAL EFFUSION: ICD-10-CM

## 2023-03-03 DIAGNOSIS — N39.0 URINARY TRACT INFECTION: Primary | ICD-10-CM

## 2023-03-03 PROBLEM — I25.10 CAD (CORONARY ARTERY DISEASE): Status: ACTIVE | Noted: 2023-03-03

## 2023-03-03 LAB
ANION GAP SERPL CALCULATED.3IONS-SCNC: 9 MMOL/L (ref 4–13)
BACTERIA UR QL AUTO: ABNORMAL /HPF
BASOPHILS # BLD AUTO: 0.07 THOUSANDS/ÂΜL (ref 0–0.1)
BASOPHILS NFR BLD AUTO: 1 % (ref 0–1)
BILIRUB UR QL STRIP: NEGATIVE
BUN SERPL-MCNC: 50 MG/DL (ref 5–25)
CALCIUM SERPL-MCNC: 11 MG/DL (ref 8.4–10.2)
CHLORIDE SERPL-SCNC: 99 MMOL/L (ref 96–108)
CLARITY UR: ABNORMAL
CO2 SERPL-SCNC: 27 MMOL/L (ref 21–32)
COLOR UR: YELLOW
CREAT SERPL-MCNC: 2.5 MG/DL (ref 0.6–1.3)
EOSINOPHIL # BLD AUTO: 0.68 THOUSAND/ÂΜL (ref 0–0.61)
EOSINOPHIL NFR BLD AUTO: 5 % (ref 0–6)
ERYTHROCYTE [DISTWIDTH] IN BLOOD BY AUTOMATED COUNT: 16.5 % (ref 11.6–15.1)
GFR SERPL CREATININE-BSD FRML MDRD: 23 ML/MIN/1.73SQ M
GLUCOSE SERPL-MCNC: 129 MG/DL (ref 65–140)
GLUCOSE SERPL-MCNC: 215 MG/DL (ref 65–140)
GLUCOSE UR STRIP-MCNC: NEGATIVE MG/DL
HCT VFR BLD AUTO: 29.4 % (ref 36.5–49.3)
HGB BLD-MCNC: 9.4 G/DL (ref 12–17)
HGB UR QL STRIP.AUTO: ABNORMAL
IMM GRANULOCYTES # BLD AUTO: 0.06 THOUSAND/UL (ref 0–0.2)
IMM GRANULOCYTES NFR BLD AUTO: 0 % (ref 0–2)
KETONES UR STRIP-MCNC: NEGATIVE MG/DL
LACTATE SERPL-SCNC: 1.1 MMOL/L (ref 0.5–2)
LEUKOCYTE ESTERASE UR QL STRIP: ABNORMAL
LYMPHOCYTES # BLD AUTO: 1.69 THOUSANDS/ÂΜL (ref 0.6–4.47)
LYMPHOCYTES NFR BLD AUTO: 12 % (ref 14–44)
MCH RBC QN AUTO: 29.3 PG (ref 26.8–34.3)
MCHC RBC AUTO-ENTMCNC: 32 G/DL (ref 31.4–37.4)
MCV RBC AUTO: 92 FL (ref 82–98)
MONOCYTES # BLD AUTO: 0.74 THOUSAND/ÂΜL (ref 0.17–1.22)
MONOCYTES NFR BLD AUTO: 5 % (ref 4–12)
NEUTROPHILS # BLD AUTO: 11.3 THOUSANDS/ÂΜL (ref 1.85–7.62)
NEUTS SEG NFR BLD AUTO: 77 % (ref 43–75)
NITRITE UR QL STRIP: NEGATIVE
NON-SQ EPI CELLS URNS QL MICRO: ABNORMAL /HPF
NRBC BLD AUTO-RTO: 0 /100 WBCS
OTHER STN SPEC: ABNORMAL
PH UR STRIP.AUTO: 5.5 [PH]
PLATELET # BLD AUTO: 252 THOUSANDS/UL (ref 149–390)
PMV BLD AUTO: 10.4 FL (ref 8.9–12.7)
POTASSIUM SERPL-SCNC: 4.6 MMOL/L (ref 3.5–5.3)
PROT UR STRIP-MCNC: ABNORMAL MG/DL
RBC # BLD AUTO: 3.21 MILLION/UL (ref 3.88–5.62)
RBC #/AREA URNS AUTO: ABNORMAL /HPF
SODIUM SERPL-SCNC: 135 MMOL/L (ref 135–147)
SP GR UR STRIP.AUTO: 1.02 (ref 1–1.03)
UROBILINOGEN UR QL STRIP.AUTO: 0.2 E.U./DL
WBC # BLD AUTO: 14.54 THOUSAND/UL (ref 4.31–10.16)
WBC #/AREA URNS AUTO: ABNORMAL /HPF

## 2023-03-03 RX ORDER — ACETAMINOPHEN 325 MG/1
650 TABLET ORAL EVERY 6 HOURS PRN
Status: DISCONTINUED | OUTPATIENT
Start: 2023-03-03 | End: 2023-03-04

## 2023-03-03 RX ORDER — EZETIMIBE 10 MG/1
10 TABLET ORAL DAILY
Status: DISCONTINUED | OUTPATIENT
Start: 2023-03-04 | End: 2023-03-13 | Stop reason: HOSPADM

## 2023-03-03 RX ORDER — ASPIRIN 81 MG/1
81 TABLET ORAL DAILY
Status: DISCONTINUED | OUTPATIENT
Start: 2023-03-04 | End: 2023-03-13 | Stop reason: HOSPADM

## 2023-03-03 RX ORDER — LINEZOLID 600 MG/1
600 TABLET, FILM COATED ORAL EVERY 12 HOURS
Status: DISCONTINUED | OUTPATIENT
Start: 2023-03-04 | End: 2023-03-09

## 2023-03-03 RX ORDER — DOCUSATE SODIUM 100 MG/1
100 CAPSULE, LIQUID FILLED ORAL 2 TIMES DAILY
Status: DISCONTINUED | OUTPATIENT
Start: 2023-03-03 | End: 2023-03-13 | Stop reason: HOSPADM

## 2023-03-03 RX ORDER — SIMETHICONE 80 MG
80 TABLET,CHEWABLE ORAL 4 TIMES DAILY PRN
Status: DISCONTINUED | OUTPATIENT
Start: 2023-03-03 | End: 2023-03-13 | Stop reason: HOSPADM

## 2023-03-03 RX ORDER — SODIUM CHLORIDE 9 MG/ML
100 INJECTION, SOLUTION INTRAVENOUS CONTINUOUS
Status: DISPENSED | OUTPATIENT
Start: 2023-03-03 | End: 2023-03-04

## 2023-03-03 RX ORDER — PANTOPRAZOLE SODIUM 40 MG/1
40 TABLET, DELAYED RELEASE ORAL
Status: DISCONTINUED | OUTPATIENT
Start: 2023-03-04 | End: 2023-03-13 | Stop reason: HOSPADM

## 2023-03-03 RX ORDER — HEPARIN SODIUM 5000 [USP'U]/ML
5000 INJECTION, SOLUTION INTRAVENOUS; SUBCUTANEOUS EVERY 8 HOURS SCHEDULED
Status: DISCONTINUED | OUTPATIENT
Start: 2023-03-03 | End: 2023-03-13 | Stop reason: HOSPADM

## 2023-03-03 RX ORDER — INSULIN LISPRO 100 [IU]/ML
1-5 INJECTION, SOLUTION INTRAVENOUS; SUBCUTANEOUS
Status: DISCONTINUED | OUTPATIENT
Start: 2023-03-04 | End: 2023-03-04

## 2023-03-03 RX ORDER — MAGNESIUM HYDROXIDE/ALUMINUM HYDROXICE/SIMETHICONE 120; 1200; 1200 MG/30ML; MG/30ML; MG/30ML
30 SUSPENSION ORAL EVERY 6 HOURS PRN
Status: DISCONTINUED | OUTPATIENT
Start: 2023-03-03 | End: 2023-03-13 | Stop reason: HOSPADM

## 2023-03-03 RX ORDER — CALCIUM CARBONATE 200(500)MG
1000 TABLET,CHEWABLE ORAL DAILY PRN
Status: DISCONTINUED | OUTPATIENT
Start: 2023-03-03 | End: 2023-03-03 | Stop reason: ALTCHOICE

## 2023-03-03 RX ORDER — LINEZOLID 2 MG/ML
600 INJECTION, SOLUTION INTRAVENOUS ONCE
Status: COMPLETED | OUTPATIENT
Start: 2023-03-03 | End: 2023-03-03

## 2023-03-03 RX ORDER — OXYBUTYNIN CHLORIDE 5 MG/1
5 TABLET ORAL 3 TIMES DAILY
Status: DISCONTINUED | OUTPATIENT
Start: 2023-03-03 | End: 2023-03-13 | Stop reason: HOSPADM

## 2023-03-03 RX ORDER — DULOXETIN HYDROCHLORIDE 30 MG/1
30 CAPSULE, DELAYED RELEASE ORAL DAILY
Status: DISCONTINUED | OUTPATIENT
Start: 2023-03-04 | End: 2023-03-03

## 2023-03-03 RX ORDER — CALCITRIOL 0.25 UG/1
0.25 CAPSULE, LIQUID FILLED ORAL DAILY
Status: DISCONTINUED | OUTPATIENT
Start: 2023-03-04 | End: 2023-03-03 | Stop reason: ALTCHOICE

## 2023-03-03 RX ORDER — ARIPIPRAZOLE 2 MG/1
2 TABLET ORAL DAILY
Status: DISCONTINUED | OUTPATIENT
Start: 2023-03-04 | End: 2023-03-13 | Stop reason: HOSPADM

## 2023-03-03 RX ORDER — INSULIN GLARGINE 100 [IU]/ML
10 INJECTION, SOLUTION SUBCUTANEOUS
Status: DISCONTINUED | OUTPATIENT
Start: 2023-03-03 | End: 2023-03-09

## 2023-03-03 RX ORDER — GABAPENTIN 300 MG/1
300 CAPSULE ORAL
Status: DISCONTINUED | OUTPATIENT
Start: 2023-03-03 | End: 2023-03-13 | Stop reason: HOSPADM

## 2023-03-03 RX ORDER — OXYCODONE HYDROCHLORIDE 5 MG/1
5 TABLET ORAL EVERY 4 HOURS PRN
Status: DISCONTINUED | OUTPATIENT
Start: 2023-03-03 | End: 2023-03-07

## 2023-03-03 RX ORDER — ONDANSETRON 2 MG/ML
4 INJECTION INTRAMUSCULAR; INTRAVENOUS EVERY 6 HOURS PRN
Status: DISCONTINUED | OUTPATIENT
Start: 2023-03-03 | End: 2023-03-07

## 2023-03-03 RX ADMIN — DOCUSATE SODIUM 100 MG: 100 CAPSULE, LIQUID FILLED ORAL at 19:56

## 2023-03-03 RX ADMIN — HEPARIN SODIUM 5000 UNITS: 5000 INJECTION INTRAVENOUS; SUBCUTANEOUS at 22:04

## 2023-03-03 RX ADMIN — OXYBUTYNIN CHLORIDE 5 MG: 5 TABLET ORAL at 22:04

## 2023-03-03 RX ADMIN — BIMATOPROST 1 DROP: 0.1 SOLUTION/ DROPS OPHTHALMIC at 22:04

## 2023-03-03 RX ADMIN — METOPROLOL TARTRATE 25 MG: 25 TABLET, FILM COATED ORAL at 22:04

## 2023-03-03 RX ADMIN — LINEZOLID 600 MG: 600 INJECTION, SOLUTION INTRAVENOUS at 17:47

## 2023-03-03 RX ADMIN — GABAPENTIN 300 MG: 300 CAPSULE ORAL at 22:04

## 2023-03-03 RX ADMIN — SODIUM CHLORIDE 100 ML/HR: 0.9 INJECTION, SOLUTION INTRAVENOUS at 19:57

## 2023-03-03 RX ADMIN — INSULIN GLARGINE 10 UNITS: 100 INJECTION, SOLUTION SUBCUTANEOUS at 22:03

## 2023-03-03 NOTE — ED NOTES
Charge RN called for US IV access and blood work  LAC IV flushed, is able to flush but has no blood return and is a difficult flush        Wilner Haji, [place]  03/03/23 [id number]

## 2023-03-03 NOTE — ED NOTES
Patient's IV access checked and arm appears swollen surrounding site  Patient asked if his arm is hurting and patient stated it was  IV attempted to be flushed, was easily flushed but no blood return noted  Medication was immediately put on hold and pharmacy called to discuss medication infiltration        Josiah Perales RN  03/03/23 [id number]

## 2023-03-03 NOTE — ED NOTES
Multiple attempts for a second set of blood cultures without success        Angela Jacques RN  03/03/23 [id number]

## 2023-03-03 NOTE — ED NOTES
Mario Juarez RN attempted for IV access/blood draw and was unsuccessful        Wilner Haji RN  03/03/23 [id number]

## 2023-03-03 NOTE — TELEPHONE ENCOUNTER
Patient's wife, Hi Paul, called the office to clarify the reason the patient needs to go to the ER  Gave her Dr Lisa Dowell explanation from last night regarding IV antibiotics for the UTI  Patient did not explain that to her  She stated she understood the explanation          Called and spoke to patient as per Dr Lisa Dowell request   Dr Valentin Morris noticed that the patient did not go to the ER last night as instructed  He had a lengthy conversation with the patient last evening      Spoke to patient today and he states that he will be going today to the ER

## 2023-03-03 NOTE — ED PROVIDER NOTES
History  Chief Complaint   Patient presents with   • Possible UTI     Patient with hx of cancer  Unable to undergo treatment due to reoccurring UTI  Patient with b/l nephrostomy tubes  Left flank pain     23-year-old male, history of metastatic cancer, bilateral nephrostomy tubes in place, presenting with dysuria  Patient states he still urinates, nephrostomy tubes have been draining well according to patient  Patient was instructed to come to ED for IV antibiotics by primary doctor  Patient denies any fevers or nausea  Reports discomfort with urination, no current hematuria  History provided by:  Patient and medical records   used: No        Prior to Admission Medications   Prescriptions Last Dose Informant Patient Reported? Taking?    ARIPiprazole (ABILIFY) 2 mg tablet   Yes No   Sig: Take 2 mg by mouth daily   Accu-Chek Softclix Lancets lancets   No No   Sig: Test blood sugar 4 times daily   Azelastine HCl 0 15 % SOLN   No No   Sig: Inhale 1 spray 2 (two) times a day   Bimatoprost (LUMIGAN OP)   Yes No   Sig: Apply 1 drop to eye daily at bedtime    DULoxetine (CYMBALTA) 30 mg delayed release capsule   No No   Sig: TAKE ONE CAPSULE BY MOUTH EVERY DAY   Ferrous Sulfate Dried (Feosol) 200 (65 Fe) MG TABS   No No   Sig: Take 65 mg by mouth daily   Patient not taking: Reported on 2/23/2023   Insulin Pen Needle 31G X 8 MM MISC   No No   Sig: Inject 3 times a day   acetaminophen (TYLENOL) 325 mg tablet   Yes No   Sig: Take 650 mg by mouth every 6 (six) hours as needed for mild pain     aspirin (ECOTRIN LOW STRENGTH) 81 mg EC tablet   No No   Sig: Take 1 tablet (81 mg total) by mouth daily   calcitriol (ROCALTROL) 0 25 mcg capsule   No No   Sig: Take 1 capsule (0 25 mcg total) by mouth daily   docusate sodium (COLACE) 100 mg capsule   Yes No   Sig: Take 100 mg by mouth 2 (two) times a day   ezetimibe (ZETIA) 10 mg tablet   No No   Sig: Take 1 tablet (10 mg total) by mouth daily   fluticasone (FLONASE) 50 mcg/act nasal spray   No No   Si spray into each nostril as needed for allergies   furosemide (LASIX) 20 mg tablet   No No   Sig: Take 1 tablet (20 mg total) by mouth daily   furosemide (LASIX) 20 mg tablet   Yes No   Sig: Take 20 mg by mouth 2 (two) times a day Take 1 tablet every other day   Patient not taking: Reported on 2023   gabapentin (NEURONTIN) 300 mg capsule   No No   Sig: TAKE ONE CAPSULE BY MOUTH EVERY DAY AT BEDTIME   loperamide (IMODIUM) 2 mg capsule   Yes No   Sig: Take 2 mg by mouth 4 (four) times a day as needed for diarrhea   metoprolol tartrate (LOPRESSOR) 25 mg tablet   No No   Sig: Take 1 tablet (25 mg total) by mouth every 12 (twelve) hours   multivitamin (THERAGRAN) TABS   Yes No   Sig: Take 1 tablet by mouth daily     omeprazole (PriLOSEC) 20 mg delayed release capsule   No No   Sig: Take 1 capsule (20 mg total) by mouth daily in the early morning PRN   oxyCODONE (OXY-IR) 5 MG capsule   Yes No   Sig: Take 5 mg by mouth every 4 (four) hours as needed for moderate pain   oxyCODONE (ROXICODONE) 10 MG TABS   Yes No   Sig: Take 10 mg by mouth every 8 (eight) hours as needed for moderate pain   oxybutynin (DITROPAN) 5 mg tablet   No No   Sig: Take 1 tablet (5 mg total) by mouth 3 (three) times a day   polyethylene glycol (MIRALAX) 17 g packet   No No   Sig: Take 17 g by mouth daily   senna (SENOKOT) 8 6 MG tablet   No No   Sig: Take 2 tablets (17 2 mg total) by mouth 2 (two) times a day      Facility-Administered Medications: None       Past Medical History:   Diagnosis Date   • Anemia     Last assessed: 17   • Anxiety    • Arteriosclerotic cardiovascular disease     Last assessed: 17   • Arthritis    • Bladder cancer ([place] [address] )     bladder- had BCG treatments   • Chronic kidney disease     Stage IV   • CKD (chronic kidney disease) stage 4, GFR 15-29 ml/min ([facility])    • Colon polyp    • Coronary artery disease     7 stents   • Depression    • Diabetes mellitus ([place] [address] ) IDDM   • GERD (gastroesophageal reflux disease)    • Glaucoma    • Hematuria    • History of fusion of cervical spine    • Hyperlipidemia    • Hypertension    • Insomnia     Last assessed: 11/14/12   • Loss of hearing     has hearing aids but usually does not wear them   • Lung cancer [facility])    • Metastatic cancer ([facility] [address] )    • Other seasonal allergic rhinitis     Last assessed: 2/10/16   • PAD (peripheral artery disease) ([facility])    • Shortness of breath     on exertion   • Spinal stenosis of lumbar region    • Transient cerebral ischemia     No Residual   • Uses walker     w/c for longer distances       Past Surgical History:   Procedure Laterality Date   • CARDIAC SURGERY      Cath stent placement  Last assessed: 3/9/17  Interventional Catheterization   • CHOLECYSTECTOMY     • COLONOSCOPY     • CYSTOSCOPY      Diagnostic w/biopsy  Alex Woodruff  Last assessed: 12/1/14   • CYSTOSCOPY N/A 04/12/2022    Procedure: CYSTOSCOPY  Bladder biopsies  ;  Surgeon: Gerber Rao MD;  [facility];  Service: Urology   • CYSTOSCOPY W/ RETROGRADES Right 03/01/2022    Procedure: CYSTO; stent removal retrograde;  Surgeon: Gerber Rao MD;  [facility];  Service: Urology   • CYSTOSCOPY W/ URETERAL STENT PLACEMENT Bilateral 10/18/2021    Procedure: bilateral retrogrades, cytology collection;  Surgeon: Gerber Rao MD;  [facility];  Service: Urology   • CYSTOURETHROSCOPY      w/cautery  Alex Woodruff   • FL RETROGRADE PYELOGRAM  10/18/2021   • FL RETROGRADE PYELOGRAM  10/24/2021   • FL RETROGRADE PYELOGRAM  12/14/2022   • GASTRIC BYPASS      For morbid obesity w/Shaji-en-Y   Resolved: 11/17/09   • INCISION AND DRAINAGE OF WOUND Right 02/26/2017    Procedure: INCISION AND DRAINAGE (I&D) EXTREMITY WITH APPLICATION OF GRAFT JACKET;  Surgeon: Gera Domínguez DPM;  [facility];  Service:    • INCISION AND DRAINAGE OF WOUND Right 04/25/2017    Procedure: INCISION AND DRAINAGE (I&D) EXTREMITY, APPLICATION OF GRAFT;  Surgeon: Latisha Ross DPM;  [facility];  Service:    • IR BIOPSY OTHER  07/02/2020   • IR LOWER EXTREMITY ANGIOGRAM  02/08/2021   • IR LOWER EXTREMITY ANGIOGRAM  02/11/2021   • IR NEPHROSTOMY TUBE CHECK/CHANGE/REPOSITION/REINSERTION/UPSIZE  04/28/2022   • IR NEPHROSTOMY TUBE CHECK/CHANGE/REPOSITION/REINSERTION/UPSIZE  05/24/2022   • IR NEPHROSTOMY TUBE CHECK/CHANGE/REPOSITION/REINSERTION/UPSIZE  06/07/2022   • IR NEPHROSTOMY TUBE CHECK/CHANGE/REPOSITION/REINSERTION/UPSIZE  07/28/2022   • IR NEPHROSTOMY TUBE CHECK/CHANGE/REPOSITION/REINSERTION/UPSIZE  11/15/2022   • IR NEPHROSTOMY TUBE CHECK/CHANGE/REPOSITION/REINSERTION/UPSIZE  1/10/2023   • IR NEPHROSTOMY TUBE PLACEMENT  02/25/2022   • IR PORT PLACEMENT  01/17/2022   • IR PORT REMOVAL  1/10/2023   • IR THORACENTESIS  09/02/2022   • IR THORACENTESIS  09/14/2022   • IR THORACENTESIS WITH TUBE PLACEMENT Left     october   • IR TUNNELED CENTRAL LINE PLACEMENT  12/24/2020   • JOINT REPLACEMENT      christofer knees replaced   • NM AMPUTATION METATARSAL W/TOE SINGLE Left 12/21/2020    Procedure: RAY RESECTION FOOT;  Surgeon: Nael Sanchez DPM;  [facility];  Service: Podiatry   • NM AMPUTATION METATARSAL W/TOE SINGLE Left 12/31/2020    Procedure: 5TH MET RESECTION;  Surgeon: Nael Sanchez DPM;  [facility];  Service: Podiatry   • NM CYSTO BLADDER W/URETERAL CATHETERIZATION Right 12/14/2022    Procedure: CYSTOSCOPY WITH RETROGRADE PYELOGRAM and CLOT EVACUATION, RIGHT STENT INSERTION, FULGRATION OF BLEEDING POINTS;  Surgeon: Farooq Hobson MD;  [facility];  Service: Urology   • NM CYSTO W/IRRIG & EVAC MULTPLE OBSTRUCTING CLOTS N/A 02/10/2021    Procedure: CYSTOSCOPY EVACUATION OF CLOTS, fulguration;  Surgeon: Jenna Samayoa MD;  [facility];  Service: Urology   • NM CYSTO W/IRRIG & EVAC MULTPLE OBSTRUCTING CLOTS N/A 10/24/2021    Procedure: CYSTOSCOPY EVACUATION OF CLOT, fulguration of bleeding vessels, right ureter stent placement, retrograde pyelogram;  Surgeon: Valeria Aguilar MD;  [facility];  Service: Urology   • GA CYSTO W/REMOVAL OF LESIONS SMALL N/A 11/19/2020    Procedure: CYSTO W/BIOPSIES, transurethral prostate bx;  Surgeon: Otis Claire MD;  [facility];  Service: Urology   • GA CYSTOURETHROSCOPY W/DEST &/RMVL TUMOR LARGE Bilateral 10/18/2021    Procedure: TRANSURETHRAL RESECTION OF BLADDER TUMOR (TURBT); Surgeon: Ritesh Stephenson MD;  [facility];  Service: Urology   • GA CYSTOURETHROSCOPY WITH BIOPSY N/A 08/16/2016    Procedure: Julius Grigsby;  Surgeon: Akash Bautista MD;  [facility];  Service: Urology   • GA Papo Chan 3RD+ ORD [address] PEL[facility] Left 02/08/2021    Procedure: LEG angiogram, CO2 w/limited contrast with balloon angioplasty postertior tibial artery;  Surgeon: Ada Garner MD;  [facility];  Service: Vascular   • ROTATOR CUFF REPAIR     • SMALL INTESTINE SURGERY      Surgery Shaji-en-Y   • SPINAL FUSION      lumbar and cervical fusions   • VAC DRESSING APPLICATION Right 96/01/2842    Procedure: APPLICATION VAC DRESSING;  Surgeon: Paula Lockhart DPM;  [facility];  Service:    • WOUND DEBRIDEMENT Left 02/16/2021    Procedure: FOOT DEBRIDE, [address] OUT w/graft application;  Surgeon: Paula Lockhart DPM;  [facility];  Service: Podiatry       Family History   Problem Relation Age of Onset   • Diabetes Mother    • Heart disease Mother    • Other Mother         High blood pressure   • Heart disease Father    • Diabetes Sister    • Other Sister         High blood pressure   • Kidney disease Sister    • Heart disease Brother    • Other Brother         High blood pressure     I have reviewed and agree with the history as documented      E-Cigarette/Vaping   • E-Cigarette Use Never User      E-Cigarette/Vaping Substances   • Nicotine No    • THC No    • CBD No    • Flavoring No    • Other No    • Unknown No      Social History     Tobacco Use   • Smoking status: Former     Packs/day: 3 00     Types: Cigarettes     Start date: 54     Quit date: 1970     Years since quittin 2     Passive exposure: Past   • Smokeless tobacco: Never   Vaping Use   • Vaping Use: Never used   Substance Use Topics   • Alcohol use: Not Currently     Comment: occasional in the past   • Drug use: Not Currently     Types: Marijuana     Comment: quit 2019 had medical marijuana       Review of Systems   Constitutional: Negative  Negative for fever  Respiratory: Negative  Cardiovascular: Negative  Gastrointestinal: Negative  Genitourinary: Positive for dysuria  Neurological: Negative  Physical Exam  Physical Exam  Vitals and nursing note reviewed  Constitutional:       General: He is not in acute distress  HENT:      Head: Normocephalic and atraumatic  Mouth/Throat:      Mouth: Mucous membranes are moist       Pharynx: Oropharynx is clear  Eyes:      Extraocular Movements: Extraocular movements intact  Pupils: Pupils are equal, round, and reactive to light  Cardiovascular:      Rate and Rhythm: Regular rhythm  Tachycardia present  Pulmonary:      Breath sounds: Normal breath sounds  Abdominal:      General: There is no distension  Palpations: Abdomen is soft  Tenderness: There is no abdominal tenderness  Genitourinary:     Penis: Normal        Testes: Normal    Musculoskeletal:         General: Normal range of motion  Skin:     General: Skin is warm and dry  Comments: Bilateral nephrostomy tubes in place, no surrounding drainage or erythema   Neurological:      General: No focal deficit present  Mental Status: He is alert and oriented to person, place, and time  Motor: No weakness           Vital Signs  ED Triage Vitals   Temperature Pulse Respirations Blood Pressure SpO2   23 1437 23 1437 23 1437 23 1437 23 1441   97 5 °F (36 4 °C) 105 18 135/81 98 %      Temp Source Heart Rate Source Patient Position - Orthostatic VS BP Location FiO2 (%)   03/03/23 1437 03/03/23 1437 03/03/23 1753 03/03/23 1753 --   Oral Monitor Sitting Right arm       Pain Score       03/03/23 1753       No Pain           Vitals:    03/03/23 1437 03/03/23 1753 03/03/23 1755   BP: 135/81  141/76   Pulse: 105 98    Patient Position - Orthostatic VS:  Sitting          Visual Acuity      ED Medications  Medications   linezolid (ZYVOX) IVPB (premix in dextrose) 600 mg 300 mL (600 mg Intravenous New Bag 3/3/23 1747)       Diagnostic Studies  Results Reviewed     Procedure Component Value Units Date/Time    Basic metabolic panel [[id number]]  (Abnormal) Collected: 03/03/23 1754    Lab Status: Final result Specimen: Blood from Arm, Left Updated: 03/03/23 1825     Sodium 135 mmol/L      Potassium 4 6 mmol/L      Chloride 99 mmol/L      CO2 27 mmol/L      ANION GAP 9 mmol/L      BUN 50 mg/dL      Creatinine 2 50 mg/dL      Glucose 129 mg/dL      Calcium 11 0 mg/dL      eGFR 23 ml/min/1 73sq m     Narrative:      [place] guidelines for Chronic Kidney Disease (CKD):   •  Stage 1 with normal or high GFR (GFR > 90 mL/min/1 73 square meters)  •  Stage 2 Mild CKD (GFR = 60-89 mL/min/1 73 square meters)  •  Stage 3A Moderate CKD (GFR = 45-59 mL/min/1 73 square meters)  •  Stage 3B Moderate CKD (GFR = 30-44 mL/min/1 73 square meters)  •  Stage 4 Severe CKD (GFR = 15-29 mL/min/1 73 square meters)  •  Stage 5 End Stage CKD (GFR <15 mL/min/1 73 square meters)  Note: GFR calculation is accurate only with a steady state creatinine    Urine Microscopic [[id number]]  (Abnormal) Collected: 03/03/23 1702    Lab Status: Final result Specimen: Urine, Other Updated: 03/03/23 1809     RBC, UA 30-50 /hpf      WBC, UA Innumerable /hpf      Epithelial Cells Occasional /hpf      Bacteria, UA Innumerable /hpf      OTHER OBSERVATIONS WBCs Clumped    Urine culture [[id number]] Collected: 03/03/23 1702    Lab Status: In process Specimen: Urine, Other Updated: 03/03/23 1809    UA w Reflex to Microscopic w Reflex to Culture [[id number]]  (Abnormal) Collected: 03/03/23 1702    Lab Status: Final result Specimen: Urine, Other Updated: 03/03/23 1713     Color, UA Yellow     Clarity, UA Cloudy     Specific Gravity, UA 1 025     pH, UA 5 5     Leukocytes, UA Large     Nitrite, UA Negative     Protein,  (2+) mg/dl      Glucose, UA Negative mg/dl      Ketones, UA Negative mg/dl      Urobilinogen, UA 0 2 E U /dl      Bilirubin, UA Negative     Occult Blood, UA Large    Blood culture #1 [[id number]] Collected: 03/03/23 1646    Lab Status: In process Specimen: Blood from Hand, Left Updated: 03/03/23 1653    Lactic acid [[id number]]  (Normal) Collected: 03/03/23 1534    Lab Status: Final result Specimen: Blood from Arm, Left Updated: 03/03/23 1601     LACTIC ACID 1 1 mmol/L     Narrative:      Result may be elevated if tourniquet was used during collection  CBC and differential [[id number]]  (Abnormal) Collected: 03/03/23 1534    Lab Status: Final result Specimen: Blood from Arm, Left Updated: 03/03/23 1543     WBC 14 54 Thousand/uL      RBC 3 21 Million/uL      Hemoglobin 9 4 g/dL      Hematocrit 29 4 %      MCV 92 fL      MCH 29 3 pg      MCHC 32 0 g/dL      RDW 16 5 %      MPV 10 4 fL      Platelets 678 Thousands/uL      nRBC 0 /100 WBCs      Neutrophils Relative 77 %      Immat GRANS % 0 %      Lymphocytes Relative 12 %      Monocytes Relative 5 %      Eosinophils Relative 5 %      Basophils Relative 1 %      Neutrophils Absolute 11 30 Thousands/µL      Immature Grans Absolute 0 06 Thousand/uL      Lymphocytes Absolute 1 69 Thousands/µL      Monocytes Absolute 0 74 Thousand/µL      Eosinophils Absolute 0 68 Thousand/µL      Basophils Absolute 0 07 Thousands/µL     Blood culture #2 [[id number]] Collected: 03/03/23 1534    Lab Status:  In process Specimen: Blood from Arm, Left Updated: 03/03/23 1539 No orders to display              Procedures  Procedures         ED Course  ED Course as of 03/03/23 1840   Fri Mar 03, 2023   1501 Spoke with PCP Dr Patt Laws  Patient with recent urine culture growing Enterococcus resistant to vancomycin, sensitive to linezolid  1649 Hemoglobin(!): 9 4  Patient with chronic anemia, current hemoglobin improved from previous  1827 Patient with elevated BUN/creatinine, similar to previous lab values  [phone] Patient comfortable in ED, discussed with hospitalist, will admit patient for continued IV antibiotics, further monitoring and evaluation  Medical Decision Making  58-year-old male, history of metastatic cancer and bilateral nephrostomy tubes in place, presenting with dysuria  Differential diagnosis includes cystitis, pyelonephritis, sepsis among other diagnoses  Discussed with primary doctor, patient had outpatient urine culture growing vancomycin-resistant Enterococcus  Labs ordered, will start patient on IV linezolid  Amount and/or Complexity of Data Reviewed  Independent Historian:      Details: Primary physician  External Data Reviewed: labs and notes  Labs: ordered  Decision-making details documented in ED Course  Risk  Prescription drug management  Decision regarding hospitalization  Disposition  Final diagnoses:   Urinary tract infection     Time reflects when diagnosis was documented in both MDM as applicable and the Disposition within this note     Time User Action Codes Description Comment    3/3/2023  6:39 PM Jeannine Dodge Add [N39 0] Urinary tract infection       ED Disposition     ED Disposition   Admit    Condition   Stable    Date/Time   Fri Mar 3, 2023  6:39 PM    Comment   Case was discussed with Dr Magaly Donato and the patient's admission status was agreed to be Admission Status: inpatient status to the service of Dr Magaly Donato              Follow-up Information    None         Patient's Medications Discharge Prescriptions    No medications on file       No discharge procedures on file      PDMP Review       Value Time User    PDMP Reviewed  Yes 1/7/2023  8:40 PM Dinesh Cowart, [address] [facility]          ED Provider  Electronically Signed by           Nely Leon MD  03/03/23 4819

## 2023-03-04 PROBLEM — E44.0 MODERATE PROTEIN-CALORIE MALNUTRITION (HCC): Status: ACTIVE | Noted: 2023-03-04

## 2023-03-04 LAB
ANION GAP SERPL CALCULATED.3IONS-SCNC: 8 MMOL/L (ref 4–13)
BACTERIA UR CULT: NORMAL
BUN SERPL-MCNC: 48 MG/DL (ref 5–25)
CALCIUM SERPL-MCNC: 10.1 MG/DL (ref 8.4–10.2)
CHLORIDE SERPL-SCNC: 101 MMOL/L (ref 96–108)
CO2 SERPL-SCNC: 25 MMOL/L (ref 21–32)
CREAT SERPL-MCNC: 2.39 MG/DL (ref 0.6–1.3)
ERYTHROCYTE [DISTWIDTH] IN BLOOD BY AUTOMATED COUNT: 16.4 % (ref 11.6–15.1)
EST. AVERAGE GLUCOSE BLD GHB EST-MCNC: 146 MG/DL
GFR SERPL CREATININE-BSD FRML MDRD: 24 ML/MIN/1.73SQ M
GLUCOSE SERPL-MCNC: 109 MG/DL (ref 65–140)
GLUCOSE SERPL-MCNC: 109 MG/DL (ref 65–140)
GLUCOSE SERPL-MCNC: 119 MG/DL (ref 65–140)
GLUCOSE SERPL-MCNC: 139 MG/DL (ref 65–140)
GLUCOSE SERPL-MCNC: 154 MG/DL (ref 65–140)
HBA1C MFR BLD: 6.7 %
HCT VFR BLD AUTO: 26.3 % (ref 36.5–49.3)
HGB BLD-MCNC: 8.5 G/DL (ref 12–17)
MCH RBC QN AUTO: 29.3 PG (ref 26.8–34.3)
MCHC RBC AUTO-ENTMCNC: 32.3 G/DL (ref 31.4–37.4)
MCV RBC AUTO: 91 FL (ref 82–98)
PLATELET # BLD AUTO: 251 THOUSANDS/UL (ref 149–390)
PMV BLD AUTO: 10 FL (ref 8.9–12.7)
POTASSIUM SERPL-SCNC: 4.6 MMOL/L (ref 3.5–5.3)
RBC # BLD AUTO: 2.9 MILLION/UL (ref 3.88–5.62)
SODIUM SERPL-SCNC: 134 MMOL/L (ref 135–147)
WBC # BLD AUTO: 15.79 THOUSAND/UL (ref 4.31–10.16)

## 2023-03-04 RX ORDER — OXYCODONE HYDROCHLORIDE 5 MG/1
5 TABLET ORAL ONCE
Status: COMPLETED | OUTPATIENT
Start: 2023-03-04 | End: 2023-03-04

## 2023-03-04 RX ORDER — POLYETHYLENE GLYCOL 3350 17 G/17G
17 POWDER, FOR SOLUTION ORAL DAILY
Status: DISCONTINUED | OUTPATIENT
Start: 2023-03-04 | End: 2023-03-05

## 2023-03-04 RX ORDER — ACETAMINOPHEN 325 MG/1
975 TABLET ORAL EVERY 8 HOURS PRN
Status: DISCONTINUED | OUTPATIENT
Start: 2023-03-04 | End: 2023-03-05

## 2023-03-04 RX ADMIN — OXYCODONE 5 MG: 5 TABLET ORAL at 23:56

## 2023-03-04 RX ADMIN — OXYBUTYNIN CHLORIDE 5 MG: 5 TABLET ORAL at 15:25

## 2023-03-04 RX ADMIN — OXYCODONE 5 MG: 5 TABLET ORAL at 02:14

## 2023-03-04 RX ADMIN — OXYCODONE 5 MG: 5 TABLET ORAL at 19:30

## 2023-03-04 RX ADMIN — BIMATOPROST 1 DROP: 0.1 SOLUTION/ DROPS OPHTHALMIC at 21:11

## 2023-03-04 RX ADMIN — ARIPIPRAZOLE 2 MG: 2 TABLET ORAL at 08:13

## 2023-03-04 RX ADMIN — DOCUSATE SODIUM 100 MG: 100 CAPSULE, LIQUID FILLED ORAL at 17:22

## 2023-03-04 RX ADMIN — ASPIRIN 81 MG: 81 TABLET, COATED ORAL at 08:13

## 2023-03-04 RX ADMIN — ACETAMINOPHEN 325MG 975 MG: 325 TABLET ORAL at 23:54

## 2023-03-04 RX ADMIN — LINEZOLID 600 MG: 600 TABLET, FILM COATED ORAL at 17:22

## 2023-03-04 RX ADMIN — EZETIMIBE 10 MG: 10 TABLET ORAL at 08:13

## 2023-03-04 RX ADMIN — OXYBUTYNIN CHLORIDE 5 MG: 5 TABLET ORAL at 21:12

## 2023-03-04 RX ADMIN — METOPROLOL TARTRATE 25 MG: 25 TABLET, FILM COATED ORAL at 08:13

## 2023-03-04 RX ADMIN — HEPARIN SODIUM 5000 UNITS: 5000 INJECTION INTRAVENOUS; SUBCUTANEOUS at 21:12

## 2023-03-04 RX ADMIN — DOCUSATE SODIUM 100 MG: 100 CAPSULE, LIQUID FILLED ORAL at 08:13

## 2023-03-04 RX ADMIN — OXYCODONE 5 MG: 5 TABLET ORAL at 06:24

## 2023-03-04 RX ADMIN — LINEZOLID 600 MG: 600 TABLET, FILM COATED ORAL at 06:13

## 2023-03-04 RX ADMIN — OXYBUTYNIN CHLORIDE 5 MG: 5 TABLET ORAL at 08:13

## 2023-03-04 RX ADMIN — HEPARIN SODIUM 5000 UNITS: 5000 INJECTION INTRAVENOUS; SUBCUTANEOUS at 15:25

## 2023-03-04 RX ADMIN — PANTOPRAZOLE SODIUM 40 MG: 40 TABLET, DELAYED RELEASE ORAL at 06:12

## 2023-03-04 RX ADMIN — OXYCODONE 5 MG: 5 TABLET ORAL at 21:12

## 2023-03-04 RX ADMIN — HEPARIN SODIUM 5000 UNITS: 5000 INJECTION INTRAVENOUS; SUBCUTANEOUS at 06:13

## 2023-03-04 RX ADMIN — GABAPENTIN 300 MG: 300 CAPSULE ORAL at 21:11

## 2023-03-04 RX ADMIN — METOPROLOL TARTRATE 25 MG: 25 TABLET, FILM COATED ORAL at 21:12

## 2023-03-04 RX ADMIN — INSULIN GLARGINE 10 UNITS: 100 INJECTION, SOLUTION SUBCUTANEOUS at 21:12

## 2023-03-04 RX ADMIN — POLYETHYLENE GLYCOL 3350 17 G: 17 POWDER, FOR SOLUTION ORAL at 21:13

## 2023-03-04 NOTE — ASSESSMENT & PLAN NOTE
· With history of bilateral hydronephrosis related to bladder cancer malignancy  · Chronic bilateral nephrostomy tubes in place  · Patient known to urology with instruction to maintain bilateral nephrostomy tubes by daily washing and cleansing    We will consult urology for further discussion of stent care given patients infection status  · CT abd/pelvis Jan 2023 reviewed "Bilateral percutaneous nephrostomy tubes in expected position as well as right double-J ureteral stent"

## 2023-03-04 NOTE — ASSESSMENT & PLAN NOTE
· Continue Lopressor 25 mg every 12 hours (refills of this medication on discharge be provided as he only has 50 mg tablets), aspirin and Zetia  · Not on statin due to myalgias  · Without chest pain  · Followed by [facility]   He has a hx of CAD s/p stents (last PCI 10/2019)

## 2023-03-04 NOTE — QUICK NOTE
Discussed patient's care with urology  No further suggestions at this time  Continue antibiotics and follow-up cultures  Updated plan with patients grand daughter Waqas Villarreal via phone

## 2023-03-04 NOTE — ASSESSMENT & PLAN NOTE
· With history of bilateral hydronephrosis related to bladder cancer malignancy  · Chronic bilateral nephrostomy tubes in place  · Patient known to urology with instruction to maintain bilateral nephrostomy tubes by daily washing and cleansing      · CT abd/pelvis Jan 2023 reviewed "Bilateral percutaneous nephrostomy tubes in expected position as well as right double-J ureteral stent"  · Appreciate urology recommendations  · Recommend tx with antibiotics and f/u cultures, no further intervention needed at this time

## 2023-03-04 NOTE — ASSESSMENT & PLAN NOTE
· Continue Lopressor 25 mg every 12 hours (refills of this medication on discharge be provided as he only has 50 mg tablets), aspirin and Zetia  · Not on statin due to myalgias  · Without chest pain  · Followed by [facility]   He has a hx of CAD s/p stents (last PCI 10/2019)

## 2023-03-04 NOTE — PLAN OF CARE
Problem: Potential for Falls  Goal: Patient will remain free of falls  Description: INTERVENTIONS:  - Educate patient/family on patient safety including physical limitations  - Instruct patient to call for assistance with activity   - Consult OT/PT to assist with strengthening/mobility   - Keep Call bell within reach  - Keep bed low and locked with side rails adjusted as appropriate  - Keep care items and personal belongings within reach  - Initiate and maintain comfort rounds  - Make Fall Risk Sign visible to staff  - Offer Toileting every 2 Hours, in advance of need  - Initiate/Maintain bed alarm  - Obtain necessary fall risk management equipment: bed alarm   - Apply yellow socks and bracelet for high fall risk patients  - Consider moving patient to room near nurses station  Outcome: Progressing     Problem: Nutrition/Hydration-ADULT  Goal: Nutrient/Hydration intake appropriate for improving, restoring or maintaining nutritional needs  Description: Monitor and assess patient's nutrition/hydration status for malnutrition  Collaborate with interdisciplinary team and initiate plan and interventions as ordered  Monitor patient's weight and dietary intake as ordered or per policy  Utilize nutrition screening tool and intervene as necessary  Determine patient's food preferences and provide high-protein, high-caloric foods as appropriate       INTERVENTIONS:  - Monitor oral intake, urinary output, labs, and treatment plans  - Assess nutrition and hydration status and recommend course of action  - Evaluate amount of meals eaten  - Assist patient with eating if necessary   - Allow adequate time for meals  - Recommend/ encourage appropriate diets, oral nutritional supplements, and vitamin/mineral supplements  - Order, calculate, and assess calorie counts as needed  - Recommend, monitor, and adjust tube feedings and TPN/PPN based on assessed needs  - Assess need for intravenous fluids  - Provide specific nutrition/hydration education as appropriate  - Include patient/family/caregiver in decisions related to nutrition  Outcome: Progressing     Problem: MOBILITY - ADULT  Goal: Maintain or return to baseline ADL function  Description: INTERVENTIONS:  -  Assess patient's ability to carry out ADLs; assess patient's baseline for ADL function and identify physical deficits which impact ability to perform ADLs (bathing, care of mouth/teeth, toileting, grooming, dressing, etc )  - Assess/evaluate cause of self-care deficits   - Assess range of motion  - Assess patient's mobility; develop plan if impaired  - Assess patient's need for assistive devices and provide as appropriate  - Encourage maximum independence but intervene and supervise when necessary  - Involve family in performance of ADLs  - Assess for home care needs following discharge   - Consider OT consult to assist with ADL evaluation and planning for discharge  - Provide patient education as appropriate  Outcome: Progressing  Goal: Maintains/Returns to pre admission functional level  Description: INTERVENTIONS:  - Perform BMAT or MOVE assessment daily    - Set and communicate daily mobility goal to care team and patient/family/caregiver  - Collaborate with rehabilitation services on mobility goals if consulted  - Perform Range of Motion 3 times a day  - Reposition patient every 2 hours    - Dangle patient 3 times a day  - Stand patient 3 times a day  - Ambulate patient 3 times a day  - Out of bed to chair 3 times a day   - Out of bed for meals 3 times a day  - Out of bed for toileting  - Record patient progress and toleration of activity level   Outcome: Progressing     Problem: Prexisting or High Potential for Compromised Skin Integrity  Goal: Skin integrity is maintained or improved  Description: INTERVENTIONS:  - Identify patients at risk for skin breakdown  - Assess and monitor skin integrity  - Assess and monitor nutrition and hydration status  - Monitor labs   - Assess for incontinence   - Turn and reposition patient  - Assist with mobility/ambulation  - Relieve pressure over bony prominences  - Avoid friction and shearing  - Provide appropriate hygiene as needed including keeping skin clean and dry  - Evaluate need for skin moisturizer/barrier cream  - Collaborate with interdisciplinary team   - Patient/family teaching  - Consider wound care consult   Outcome: Progressing     Problem: GENITOURINARY - ADULT  Goal: Maintains or returns to baseline urinary function  Description: INTERVENTIONS:  - Assess urinary function  - Encourage oral fluids to ensure adequate hydration if ordered  - Administer IV fluids as ordered to ensure adequate hydration  - Administer ordered medications as needed  - Offer frequent toileting  - Follow urinary retention protocol if ordered  Outcome: Progressing  Goal: Absence of urinary retention  Description: INTERVENTIONS:  - Assess patient’s ability to void and empty bladder  - Monitor I/O  - Bladder scan as needed  - Discuss with physician/AP medications to alleviate retention as needed  - Discuss catheterization for long term situations as appropriate  Outcome: Progressing  Goal: Urinary catheter remains patent  Description: INTERVENTIONS:  - Assess patency of urinary catheter  - If patient has a chronic wagner, consider changing catheter if non-functioning  - Follow guidelines for intermittent irrigation of non-functioning urinary catheter  Outcome: Progressing     Problem: METABOLIC, FLUID AND ELECTROLYTES - ADULT  Goal: Electrolytes maintained within normal limits  Description: INTERVENTIONS:  - Monitor labs and assess patient for signs and symptoms of electrolyte imbalances  - Administer electrolyte replacement as ordered  - Monitor response to electrolyte replacements, including repeat lab results as appropriate  - Instruct patient on fluid and nutrition as appropriate  Outcome: Progressing  Goal: Fluid balance maintained  Description: INTERVENTIONS:  - Monitor labs   - Monitor I/O and WT  - Instruct patient on fluid and nutrition as appropriate  - Assess for signs & symptoms of volume excess or deficit  Outcome: Progressing  Goal: Glucose maintained within target range  Description: INTERVENTIONS:  - Monitor Blood Glucose as ordered  - Assess for signs and symptoms of hyperglycemia and hypoglycemia  - Administer ordered medications to maintain glucose within target range  - Assess nutritional intake and initiate nutrition service referral as needed  Outcome: Progressing     Problem: SKIN/TISSUE INTEGRITY - ADULT  Goal: Skin Integrity remains intact(Skin Breakdown Prevention)  Description: Assess:  -Perform Ortega assessment every shift  -Clean and moisturize skin every shift  -Inspect skin when repositioning, toileting, and assisting with ADLS  -Assess extremities for adequate circulation and sensation     Bed Management:  -Have minimal linens on bed & keep smooth, unwrinkled  -Change linens as needed when moist or perspiring  -Avoid sitting or lying in one position for more than 2 hours while in bed  -Keep HOB at 30 degrees     Toileting:  -Offer bedside commode  -Assess for incontinence every shift  -Use incontinent care products after each incontinent episode such as cleansing wipes    Activity:  -Mobilize patient 3 times a day  -Encourage activity and walks on unit  -Encourage or provide ROM exercises   -Turn and reposition patient every 2 Hours  -Use appropriate equipment to lift or move patient in bed  -Instruct/ Assist with weight shifting every hour when out of bed in chair  -Consider limitation of chair time 2 hour intervals    Skin Care:  -Avoid use of baby powder, tape, friction and shearing, hot water or constrictive clothing  -Relieve pressure over bony prominences using allevyn foam  -Do not massage red bony areas    Next Steps:  -Teach patient strategies to minimize risks such as skin breakdown   -Consider consults to  interdisciplinary teams such as wound care  Outcome: Progressing  Goal: Pressure injury heals and does not worsen  Description: Interventions:  - Implement low air loss mattress or specialty surface (Criteria met)  - Apply silicone foam dressing  - Instruct/assist with weight shifting every 30 minutes when in chair   - Limit chair time to 2 hour intervals  - Use special pressure reducing interventions such as waffle cushion when in chair   - Apply fecal or urinary incontinence containment device   - Perform passive or active ROM every hour  - Turn and reposition patient & offload bony prominences every 2 hours   - Utilize friction reducing device or surface for transfers   - Consider consults to  interdisciplinary teams such as PT/OT  - Use incontinent care products after each incontinent episode such as cleansing wipes  - Consider nutrition services referral as needed  Outcome: Progressing     Problem: PAIN - ADULT  Goal: Verbalizes/displays adequate comfort level or baseline comfort level  Description: Interventions:  - Encourage patient to monitor pain and request assistance  - Assess pain using appropriate pain scale  - Administer analgesics based on type and severity of pain and evaluate response  - Implement non-pharmacological measures as appropriate and evaluate response  - Consider cultural and social influences on pain and pain management  - Notify physician/advanced practitioner if interventions unsuccessful or patient reports new pain  Outcome: Progressing     Problem: INFECTION - ADULT  Goal: Absence or prevention of progression during hospitalization  Description: INTERVENTIONS:  - Assess and monitor for signs and symptoms of infection  - Monitor lab/diagnostic results  - Monitor all insertion sites, i e  indwelling lines, tubes, and drains  - Monitor endotracheal if appropriate and nasal secretions for changes in amount and color  - [place] appropriate cooling/warming therapies per order  - Administer medications as ordered  - Instruct and encourage patient and family to use good hand hygiene technique  - Identify and instruct in appropriate isolation precautions for identified infection/condition  Outcome: Progressing  Goal: Absence of fever/infection during neutropenic period  Description: INTERVENTIONS:  - Monitor WBC    Outcome: Progressing     Problem: SAFETY ADULT  Goal: Patient will remain free of falls  Description: INTERVENTIONS:  - Educate patient/family on patient safety including physical limitations  - Instruct patient to call for assistance with activity   - Consult OT/PT to assist with strengthening/mobility   - Keep Call bell within reach  - Keep bed low and locked with side rails adjusted as appropriate  - Keep care items and personal belongings within reach  - Initiate and maintain comfort rounds  - Make Fall Risk Sign visible to staff  - Offer Toileting every 2 Hours, in advance of need  - Initiate/Maintain bed alarm  - Obtain necessary fall risk management equipment: bed alarm  - Apply yellow socks and bracelet for high fall risk patients  - Consider moving patient to room near nurses station  Outcome: Progressing  Goal: Maintain or return to baseline ADL function  Description: INTERVENTIONS:  -  Assess patient's ability to carry out ADLs; assess patient's baseline for ADL function and identify physical deficits which impact ability to perform ADLs (bathing, care of mouth/teeth, toileting, grooming, dressing, etc )  - Assess/evaluate cause of self-care deficits   - Assess range of motion  - Assess patient's mobility; develop plan if impaired  - Assess patient's need for assistive devices and provide as appropriate  - Encourage maximum independence but intervene and supervise when necessary  - Involve family in performance of ADLs  - Assess for home care needs following discharge   - Consider OT consult to assist with ADL evaluation and planning for discharge  - Provide patient education as appropriate  Outcome: Progressing  Goal: Maintains/Returns to pre admission functional level  Description: INTERVENTIONS:  - Perform BMAT or MOVE assessment daily    - Set and communicate daily mobility goal to care team and patient/family/caregiver  - Collaborate with rehabilitation services on mobility goals if consulted  - Perform Range of Motion 3 times a day  - Reposition patient every 2 hours    - Dangle patient 3 times a day  - Stand patient 3 times a day  - Ambulate patient 3 times a day  - Out of bed to chair 3 times a day   - Out of bed for meals 3 times a day  - Out of bed for toileting  - Record patient progress and toleration of activity level   Outcome: Progressing

## 2023-03-04 NOTE — CONSULTS
UROLOGY VIRTUAL CONSULTATION NOTE     Patient Identifiers: Faisal Espinoza (MRN [id number])  Service Requesting Consultation: SLIM  Service Providing Consultation:  Urology, Rehan Leone MD    Date of Service: 3/4/2023  Consults    Reason for Consultation: bladder cancer, bilateral nephrostomy tubes    E-consultation performed given no attending presence on this campus at the current time (16 minutes spent on this consult in review of labs and imaging and history and physical findings)    History of Present Illness:     Faisal Espinoza is a [de-identified] y o  old with a history of recurrent high risk bladder cancer status post multiple resections and intravesical therapies  He is also status post trimodality therapy with chemotherapy and radiation completed in February 2022  He does have bilateral nephrostomy tubes  He follows with [facility] urology as well, notes reviewed from care everywhere  As per his note from a telemedicine encounter in October he was on immunotherapy systemic therapy    A recent PET scan shows progression of disease  He is currently in the hospital with concern for a complicated UTI with a resistant bacterium  Urology was asked whether or not there is a role for operative intervention at this time  His nephrostomy tubes were last changed in January      The following portions of the patient's history were reviewed and updated as appropriate: allergies, current medications, past family history, past medical history, past social history, past surgical history and problem list     Past Medical, Past Surgical History:     Past Medical History:   Diagnosis Date   • Anemia     Last assessed: 9/28/17   • Anxiety    • Arteriosclerotic cardiovascular disease     Last assessed: 9/28/17   • Arthritis    • Bladder cancer ([address] )     bladder- had BCG treatments   • Chronic kidney disease     Stage IV   • CKD (chronic kidney disease) stage 4, GFR 15-29 ml/min ([facility])    • Colon polyp    • Coronary artery disease 7 stents   • Depression    • Diabetes mellitus ([facility] )     IDDM   • GERD (gastroesophageal reflux disease)    • Glaucoma    • Hematuria    • History of fusion of cervical spine    • Hyperlipidemia    • Hypertension    • Insomnia     Last assessed: 11/14/12   • Loss of hearing     has hearing aids but usually does not wear them   • Lung cancer [facility])    • Metastatic cancer ([facility] )    • Other seasonal allergic rhinitis     Last assessed: 2/10/16   • PAD (peripheral artery disease) ([facility])    • Shortness of breath     on exertion   • Spinal stenosis of lumbar region    • Transient cerebral ischemia     No Residual   • Uses walker     w/c for longer distances   :    Past Surgical History:   Procedure Laterality Date   • CARDIAC SURGERY      Cath stent placement  Last assessed: 3/9/17  Interventional Catheterization   • CHOLECYSTECTOMY     • COLONOSCOPY     • CYSTOSCOPY      Diagnostic w/biopsy  Xiomara Srinivasan  Last assessed: 12/1/14   • CYSTOSCOPY N/A 04/12/2022    Procedure: CYSTOSCOPY  Bladder biopsies  ;  Surgeon: Glenis Bustos MD;  [facility];  Service: Urology   • CYSTOSCOPY W/ RETROGRADES Right 03/01/2022    Procedure: CYSTO; stent removal retrograde;  Surgeon: Glenis Bustos MD;  [facility];  Service: Urology   • CYSTOSCOPY W/ URETERAL STENT PLACEMENT Bilateral 10/18/2021    Procedure: bilateral retrogrades, cytology collection;  Surgeon: Glenis Bustos MD;  [facility];  Service: Urology   • CYSTOURETHROSCOPY      w/cautery  Xiomara Srinivasan   • FL RETROGRADE PYELOGRAM  10/18/2021   • FL RETROGRADE PYELOGRAM  10/24/2021   • FL RETROGRADE PYELOGRAM  12/14/2022   • GASTRIC BYPASS      For morbid obesity w/Shaji-en-Y   Resolved: 11/17/09   • INCISION AND DRAINAGE OF WOUND Right 02/26/2017    Procedure: INCISION AND DRAINAGE (I&D) EXTREMITY WITH APPLICATION OF GRAFT JACKET;  Surgeon: Ananda Garcia DPM;  [facility];  Service:    • INCISION AND DRAINAGE OF WOUND Right 04/25/2017    Procedure: INCISION AND DRAINAGE (I&D) EXTREMITY, APPLICATION OF GRAFT;  Surgeon: Ana Galvez DPM;  [facility];  Service:    • IR BIOPSY OTHER  07/02/2020   • IR LOWER EXTREMITY ANGIOGRAM  02/08/2021   • IR LOWER EXTREMITY ANGIOGRAM  02/11/2021   • IR NEPHROSTOMY TUBE CHECK/CHANGE/REPOSITION/REINSERTION/UPSIZE  04/28/2022   • IR NEPHROSTOMY TUBE CHECK/CHANGE/REPOSITION/REINSERTION/UPSIZE  05/24/2022   • IR NEPHROSTOMY TUBE CHECK/CHANGE/REPOSITION/REINSERTION/UPSIZE  06/07/2022   • IR NEPHROSTOMY TUBE CHECK/CHANGE/REPOSITION/REINSERTION/UPSIZE  07/28/2022   • IR NEPHROSTOMY TUBE CHECK/CHANGE/REPOSITION/REINSERTION/UPSIZE  11/15/2022   • IR NEPHROSTOMY TUBE CHECK/CHANGE/REPOSITION/REINSERTION/UPSIZE  1/10/2023   • IR NEPHROSTOMY TUBE PLACEMENT  02/25/2022   • IR PORT PLACEMENT  01/17/2022   • IR PORT REMOVAL  1/10/2023   • IR THORACENTESIS  09/02/2022   • IR THORACENTESIS  09/14/2022   • IR THORACENTESIS WITH TUBE PLACEMENT Left     october   • IR TUNNELED CENTRAL LINE PLACEMENT  12/24/2020   • JOINT REPLACEMENT      christofer knees replaced   • NE AMPUTATION METATARSAL W/TOE SINGLE Left 12/21/2020    Procedure: RAY RESECTION FOOT;  Surgeon: Sophia Du DPM;  [facility];  Service: Podiatry   • NE AMPUTATION METATARSAL W/TOE SINGLE Left 12/31/2020    Procedure: 5TH MET RESECTION;  Surgeon: Sophia Du DPM;  [facility];  Service: Podiatry   • NE CYSTO BLADDER W/URETERAL CATHETERIZATION Right 12/14/2022    Procedure: CYSTOSCOPY WITH RETROGRADE PYELOGRAM and CLOT EVACUATION, RIGHT STENT INSERTION, FULGRATION OF BLEEDING POINTS;  Surgeon: Ki Goode MD;  [facility];  Service: Urology   • NE CYSTO W/IRRIG & EVAC MULTPLE OBSTRUCTING CLOTS N/A 02/10/2021    Procedure: CYSTOSCOPY EVACUATION OF CLOTS, fulguration;  Surgeon: Mary Mock MD;  [facility];  Service: Urology   • NE CYSTO W/IRRIG & EVAC MULTPLE OBSTRUCTING CLOTS N/A 10/24/2021 Procedure: CYSTOSCOPY EVACUATION OF CLOT, fulguration of bleeding vessels, right ureter stent placement, retrograde pyelogram;  Surgeon: Valeria Aguilar MD;  [facility];  Service: Urology   • OR CYSTO W/REMOVAL OF LESIONS SMALL N/A 11/19/2020    Procedure: CYSTO W/BIOPSIES, transurethral prostate bx;  Surgeon: Otis Claire MD;  [facility];  Service: Urology   • OR CYSTOURETHROSCOPY W/DEST &/RMVL TUMOR LARGE Bilateral 10/18/2021    Procedure: TRANSURETHRAL RESECTION OF BLADDER TUMOR (TURBT);   Surgeon: Ritesh Stephenson MD;  [facility];  Service: Urology   • OR CYSTOURETHROSCOPY WITH BIOPSY N/A 08/16/2016    Procedure: CYSTOSCOPY WITH BIOPSIES;  Surgeon: Akash Bautista MD;  [facility];  Service: Urology   • OR Papo Riveraдмитрий 3RD+ ORD SLCTV ABDL PEL/TR [facility] Left 02/08/2021    Procedure: LEG angiogram, CO2 w/limited contrast with balloon angioplasty postertior tibial artery;  Surgeon: Ada Garner MD;  [facility];  Service: Vascular   • ROTATOR CUFF REPAIR     • SMALL INTESTINE SURGERY      Surgery Shaji-en-Y   • SPINAL FUSION      lumbar and cervical fusions   • VAC DRESSING APPLICATION Right 04/40/6139    Procedure: APPLICATION VAC DRESSING;  Surgeon: Paula Lockhart DPM;  [facility];  Service:    • WOUND DEBRIDEMENT Left 02/16/2021    Procedure: FOOT DEBRIDE, [address] OUT w/graft application;  Surgeon: Paula Lockhart DPM;  [facility];  Service: Podiatry   :    Medications, Allergies:     Current Facility-Administered Medications   Medication Dose Route Frequency   • acetaminophen (TYLENOL) tablet 650 mg  650 mg Oral Q6H PRN   • aluminum-magnesium hydroxide-simethicone (MYLANTA) oral suspension 30 mL  30 mL Oral Q6H PRN   • ARIPiprazole (ABILIFY) tablet 2 mg  2 mg Oral Daily   • aspirin (ECOTRIN LOW STRENGTH) EC tablet 81 mg  81 mg Oral Daily   • bimatoprost (LUMIGAN) 0 01 % ophthalmic solution 1 drop  1 drop Both Eyes HS   • docusate sodium (COLACE) capsule 100 mg  100 mg Oral BID   • ezetimibe (ZETIA) tablet 10 mg  10 mg Oral Daily   • gabapentin (NEURONTIN) capsule 300 mg  300 mg Oral HS   • heparin (porcine) subcutaneous injection 5,000 Units  5,000 Units Subcutaneous Q8H [address]   • insulin glargine (LANTUS) subcutaneous injection 10 Units 0 1 mL  10 Units Subcutaneous HS   • insulin lispro (HumaLOG) 100 units/mL subcutaneous injection 1-5 Units  1-5 Units Subcutaneous TID AC   • linezolid (ZYVOX) tablet 600 mg  600 mg Oral Q12H   • metoprolol tartrate (LOPRESSOR) tablet 25 mg  25 mg Oral Q12H JASMINE   • ondansetron (ZOFRAN) injection 4 mg  4 mg Intravenous Q6H PRN   • oxybutynin (DITROPAN) tablet 5 mg  5 mg Oral TID   • oxyCODONE (ROXICODONE) IR tablet 5 mg  5 mg Oral Q4H PRN   • pantoprazole (PROTONIX) EC tablet 40 mg  40 mg Oral Daily Before Breakfast   • simethicone (MYLICON) chewable tablet 80 mg  80 mg Oral 4x Daily PRN       Allergies: Allergies   Allergen Reactions   • Statins Hives and Itching     Includes atorvastatin and simvastatin     • Insulin Lispro Swelling and Edema     " Lower Legs"   • Other Itching, Rash and Other (See Comments)     "EKG Patches"   "blue EKG patches"   :    Social and Family History:   Social History:   Social History     Tobacco Use   • Smoking status: Former     Packs/day: 3 00     Types: Cigarettes     Start date:      Quit date: 1970     Years since quittin 2     Passive exposure: Past   • Smokeless tobacco: Never   Vaping Use   • Vaping Use: Never used   Substance Use Topics   • Alcohol use: Not Currently     Comment: occasional in the past   • Drug use: Not Currently     Types: Marijuana     Comment: quit 2019 had medical marijuana         Social History     Tobacco Use   Smoking Status Former   • Packs/day: 3 00   • Types: Cigarettes   • Start date:    • Quit date: 5   • Years since quittin 2   • Passive exposure: Past   Smokeless Tobacco Never       Family History:  Family History   Problem Relation Age of Onset   • Diabetes Mother    • Heart disease Mother    • Other Mother         High blood pressure   • Heart disease Father    • Diabetes Sister    • Other Sister         High blood pressure   • Kidney disease Sister    • Heart disease Brother    • Other Brother         High blood pressure   :       Physical Exam:   /79 (BP Location: Left arm)   Pulse 98   Temp 98 2 °F (36 8 °C) (Oral)   Resp 18   Wt 89 5 kg (197 lb 5 oz)   SpO2 98%   BMI 24 02 kg/m² Temp (24hrs), Av 6 °F (36 4 °C), Min:97 2 °F (36 2 °C), Max:98 2 °F (36 8 °C)  current; Temperature: 98 2 °F (36 8 °C)  I/O last 24 hours: In: -   Out: 900 [Urine:900]      Labs:     Lab Results   Component Value Date    HGB 8 5 (L) 2023    HCT 26 3 (L) 2023    WBC 15 79 (H) 2023     2023   ]    Lab Results   Component Value Date     2015    K 4 6 2023     2023    CO2 25 2023    BUN 48 (H) 2023    CREATININE 2 39 (H) 2023    CALCIUM 10 1 2023    GLUCOSE 203 (H) 2015   ]    Imaging:   I personally reviewed the images and report of the following studies, and reviewed them with the patient:    CT Abdomen/Pelvis: Both CT scan and PET scan findings were reviewed  Concern for progression of disease  Nephrostomy tubes are in good position      ASSESSMENT:     [de-identified] y o  old male with  metastatic, high risk bladder cancer, status post multimodal therapy including trimodality therapy and also immunotherapy  Currently admitted with complicated UTI  We recommend treatment with empiric antibiosis at this time and follow-up of cultures  No role for transurethral surgery at this time  Consideration can be given to a nonurgent interventional radiology consultation for possible expedited tube exchange (typically these are changed every 3 to 4 months and he was changed in January)        PLAN:     Continued care per primary team     Empiric antibiosis with fluid resuscitation and tailoring of antibiotics once culture results are available  Can continue long-term care with prostate cancer [place] and his multidisciplinary team     Please reconsult as necessary        Thank you for allowing me to participate in this patients’ care  Please do not hesitate to call with any additional questions    Sujey Winn MD

## 2023-03-04 NOTE — ASSESSMENT & PLAN NOTE
Malnutrition Findings:   Adult Malnutrition type: Chronic illness  Adult Degree of Malnutrition: Malnutrition of moderate degree  Malnutrition Characteristics: Inadequate energy, Weight loss, Fluid accumulation    360 Statement: PCM r/t cancer with chemoradiation AEB, a 6 6% unintended wt loss from 01/02/23 to present and a 12 8% unintended wt loss from 08/26/22 to present and < 75% energy intake compared to estimated energy needs for > 1 month  BMI Findings: Body mass index is 24 02 kg/m²       · Appreciate nutrition recommendations  · Encourage meals three times daily

## 2023-03-04 NOTE — PLAN OF CARE
Problem: Potential for Falls  Goal: Patient will remain free of falls  Description: INTERVENTIONS:  - Educate patient/family on patient safety including physical limitations  - Instruct patient to call for assistance with activity   - Consult OT/PT to assist with strengthening/mobility   - Keep Call bell within reach  - Keep bed low and locked with side rails adjusted as appropriate  - Keep care items and personal belongings within reach  - Initiate and maintain comfort rounds  - Make Fall Risk Sign visible to staff  - Offer Toileting   - Initiate/Maintain   - Obtain necessary fall risk management equipment:   - Apply yellow socks and bracelet for high fall risk patients  - Consider moving patient to room near nurses station  Outcome: Progressing     Problem: Nutrition/Hydration-ADULT  Goal: Nutrient/Hydration intake appropriate for improving, restoring or maintaining nutritional needs  Description: Monitor and assess patient's nutrition/hydration status for malnutrition  Collaborate with interdisciplinary team and initiate plan and interventions as ordered  Monitor patient's weight and dietary intake as ordered or per policy  Utilize nutrition screening tool and intervene as necessary  Determine patient's food preferences and provide high-protein, high-caloric foods as appropriate       INTERVENTIONS:  - Monitor oral intake, urinary output, labs, and treatment plans  - Assess nutrition and hydration status and recommend course of action  - Evaluate amount of meals eaten  - Assist patient with eating if necessary   - Allow adequate time for meals  - Recommend/ encourage appropriate diets, oral nutritional supplements, and vitamin/mineral supplements  - Order, calculate, and assess calorie counts as needed  - Recommend, monitor, and adjust tube feedings and TPN/PPN based on assessed needs  - Assess need for intravenous fluids  - Provide specific nutrition/hydration education as appropriate  - Include patient/family/caregiver in decisions related to nutrition  Outcome: Progressing     Problem: MOBILITY - ADULT  Goal: Maintain or return to baseline ADL function  Description: INTERVENTIONS:  -  Assess patient's ability to carry out ADLs; assess patient's baseline for ADL function and identify physical deficits which impact ability to perform ADLs (bathing, care of mouth/teeth, toileting, grooming, dressing, etc )  - Assess/evaluate cause of self-care deficits   - Assess range of motion  - Assess patient's mobility; develop plan if impaired  - Assess patient's need for assistive devices and provide as appropriate  - Encourage maximum independence but intervene and supervise when necessary  - Involve family in performance of ADLs  - Assess for home care needs following discharge   - Consider OT consult to assist with ADL evaluation and planning for discharge  - Provide patient education as appropriate  Outcome: Progressing  Goal: Maintains/Returns to pre admission functional level  Description: INTERVENTIONS:  - Perform BMAT or MOVE assessment daily    - Set and communicate daily mobility goal to care team and patient/family/caregiver     - Collaborate with rehabilitation services on mobility goals if consulted  - Perform Range of Motion  - Reposition patient   - Dangle patient   - Stand patient   - Ambulate patient   - Out of bed to chair   - Out of bed for meals   - Out of bed for toileting  - Record patient progress and toleration of activity level   Outcome: Progressing     Problem: Prexisting or High Potential for Compromised Skin Integrity  Goal: Skin integrity is maintained or improved  Description: INTERVENTIONS:  - Identify patients at risk for skin breakdown  - Assess and monitor skin integrity  - Assess and monitor nutrition and hydration status  - Monitor labs   - Assess for incontinence   - Turn and reposition patient  - Assist with mobility/ambulation  - Relieve pressure over bony prominences  - Avoid friction and shearing  - Provide appropriate hygiene as needed including keeping skin clean and dry  - Evaluate need for skin moisturizer/barrier cream  - Collaborate with interdisciplinary team   - Patient/family teaching  - Consider wound care consult   Outcome: Progressing     Problem: GENITOURINARY - ADULT  Goal: Maintains or returns to baseline urinary function  Description: INTERVENTIONS:  - Assess urinary function  - Encourage oral fluids to ensure adequate hydration if ordered  - Administer IV fluids as ordered to ensure adequate hydration  - Administer ordered medications as needed  - Offer frequent toileting  - Follow urinary retention protocol if ordered  Outcome: Progressing  Goal: Absence of urinary retention  Description: INTERVENTIONS:  - Assess patient’s ability to void and empty bladder  - Monitor I/O  - Bladder scan as needed  - Discuss with physician/AP medications to alleviate retention as needed  - Discuss catheterization for long term situations as appropriate  Outcome: Progressing  Goal: Urinary catheter remains patent  Description: INTERVENTIONS:  - Assess patency of urinary catheter  - If patient has a chronic wagner, consider changing catheter if non-functioning  - Follow guidelines for intermittent irrigation of non-functioning urinary catheter  Outcome: Progressing     Problem: METABOLIC, FLUID AND ELECTROLYTES - ADULT  Goal: Electrolytes maintained within normal limits  Description: INTERVENTIONS:  - Monitor labs and assess patient for signs and symptoms of electrolyte imbalances  - Administer electrolyte replacement as ordered  - Monitor response to electrolyte replacements, including repeat lab results as appropriate  - Instruct patient on fluid and nutrition as appropriate  Outcome: Progressing  Goal: Fluid balance maintained  Description: INTERVENTIONS:  - Monitor labs   - Monitor I/O and WT  - Instruct patient on fluid and nutrition as appropriate  - Assess for signs & symptoms of volume excess or deficit  Outcome: Progressing  Goal: Glucose maintained within target range  Description: INTERVENTIONS:  - Monitor Blood Glucose as ordered  - Assess for signs and symptoms of hyperglycemia and hypoglycemia  - Administer ordered medications to maintain glucose within target range  - Assess nutritional intake and initiate nutrition service referral as needed  Outcome: Progressing     Problem: SKIN/TISSUE INTEGRITY - ADULT  Goal: Skin Integrity remains intact(Skin Breakdown Prevention)  Description: Assess:  -Perform Ortega assessment   -Clean and moisturize skin   -Inspect skin when repositioning, toileting, and assisting with ADLS  -Assess under medical devices   -Assess extremities for adequate circulation and sensation     Bed Management:  -Have minimal linens on bed & keep smooth, unwrinkled  -Change linens as needed when moist or perspiring  -Avoid sitting or lying in one position for more than 2 hours while in bed  -Keep HOB at 30 degrees     Toileting:  -Offer bedside commode  -Assess for incontinence   -Use incontinent care products after each incontinent episode     Activity:  -Mobilize patient   -Encourage activity and walks on unit  -Encourage or provide ROM exercises   -Turn and reposition patient   -Use appropriate equipment to lift or move patient in bed  -Instruct/ Assist with weight shifting   -Consider limitation of chair time    Skin Care:  -Avoid use of baby powder, tape, friction and shearing, hot water or constrictive clothing  -Relieve pressure over bony prominences using   -Do not massage red bony areas    Next Steps:  -Teach patient strategies to minimize risks   -Consider consults to  interdisciplinary teams   Outcome: Progressing  Goal: Pressure injury heals and does not worsen  Description: Interventions:  - Implement low air loss mattress or specialty surface (Criteria met)  - Apply silicone foam dressing  - Instruct/assist with weight shifting   - Limit chair time   - Use special pressure reducing interventions   - Apply fecal or urinary incontinence containment device   - Perform passive or active ROM   - Turn and reposition patient & offload bony prominences   - Utilize friction reducing device or surface for transfers   - Consider consults to  interdisciplinary teams   - Use incontinent care products after each incontinent episode   - Consider nutrition services referral as needed  Outcome: Progressing     Problem: PAIN - ADULT  Goal: Verbalizes/displays adequate comfort level or baseline comfort level  Description: Interventions:  - Encourage patient to monitor pain and request assistance  - Assess pain using appropriate pain scale  - Administer analgesics based on type and severity of pain and evaluate response  - Implement non-pharmacological measures as appropriate and evaluate response  - Consider cultural and social influences on pain and pain management  - Notify physician/advanced practitioner if interventions unsuccessful or patient reports new pain  Outcome: Progressing     Problem: INFECTION - ADULT  Goal: Absence or prevention of progression during hospitalization  Description: INTERVENTIONS:  - Assess and monitor for signs and symptoms of infection  - Monitor lab/diagnostic results  - Monitor all insertion sites, i e  indwelling lines, tubes, and drains  - Monitor endotracheal if appropriate and nasal secretions for changes in amount and color  - Ashley appropriate cooling/warming therapies per order  - Administer medications as ordered  - Instruct and encourage patient and family to use good hand hygiene technique  - Identify and instruct in appropriate isolation precautions for identified infection/condition  Outcome: Progressing  Goal: Absence of fever/infection during neutropenic period  Description: INTERVENTIONS:  - Monitor WBC    Outcome: Progressing     Problem: SAFETY ADULT  Goal: Patient will remain free of falls  Description: INTERVENTIONS:  - Educate patient/family on patient safety including physical limitations  - Instruct patient to call for assistance with activity   - Consult OT/PT to assist with strengthening/mobility   - Keep Call bell within reach  - Keep bed low and locked with side rails adjusted as appropriate  - Keep care items and personal belongings within reach  - Initiate and maintain comfort rounds  - Make Fall Risk Sign visible to staff  - Offer Toileting   - Initiate/Maintain   - Obtain necessary fall risk management equipment:   - Apply yellow socks and bracelet for high fall risk patients  - Consider moving patient to room near nurses station  Outcome: Progressing  Goal: Maintain or return to baseline ADL function  Description: INTERVENTIONS:  -  Assess patient's ability to carry out ADLs; assess patient's baseline for ADL function and identify physical deficits which impact ability to perform ADLs (bathing, care of mouth/teeth, toileting, grooming, dressing, etc )  - Assess/evaluate cause of self-care deficits   - Assess range of motion  - Assess patient's mobility; develop plan if impaired  - Assess patient's need for assistive devices and provide as appropriate  - Encourage maximum independence but intervene and supervise when necessary  - Involve family in performance of ADLs  - Assess for home care needs following discharge   - Consider OT consult to assist with ADL evaluation and planning for discharge  - Provide patient education as appropriate  Outcome: Progressing  Goal: Maintains/Returns to pre admission functional level  Description: INTERVENTIONS:  - Perform BMAT or MOVE assessment daily    - Set and communicate daily mobility goal to care team and patient/family/caregiver     - Collaborate with rehabilitation services on mobility goals if consulted  - Perform Range of Motion  - Reposition patient   - Dangle patient   - Stand patient   - Ambulate patient  - Out of bed to chair   - Out of bed for meals   - Out of bed for toileting  - Record patient progress and toleration of activity level   Outcome: Progressing

## 2023-03-04 NOTE — ASSESSMENT & PLAN NOTE
· Patient sent in to ED as requested by PCP for IV antibiotics for UTI  · Patient met sepsis criteria on admission secondary to urinary tract infection  · Lactic acid within normal limits  · Previous [facility] laboratory studies with vancomycin resistant Enterococcus  · Continue Linezolid 600 mg every 12 hours day #2 with close monitoring of platelet count and signs of serotonin syndrome  · Patient afebrile with chronically elevated white count  · Blood cultures pending  · Urine cultures pending  · Urinary retention protocol, I/O's

## 2023-03-04 NOTE — ASSESSMENT & PLAN NOTE
· Resume oxycodone 5 mg prn, PDMP reviewed  · Continue with bowel regimen to avoid constipation, Miralax twice daily

## 2023-03-04 NOTE — ASSESSMENT & PLAN NOTE
· Baseline hemoglobin appears 7-8, likely secondary to CKD and hematuria from bladder malignancy  · Hgb stable at 8 5 today, monitor

## 2023-03-04 NOTE — ASSESSMENT & PLAN NOTE
Lab Results   Component Value Date    EGFR 24 03/04/2023    EGFR 23 03/03/2023    EGFR 19 01/24/2023    CREATININE 2 39 (H) 03/04/2023    CREATININE 2 50 (H) 03/03/2023    CREATININE 2 96 (H) 01/24/2023   · baseline Cr appears around 2 5-3, at baseline  · Continue to monitor  · I/O

## 2023-03-04 NOTE — MALNUTRITION/BMI
This medical record reflects one or more clinical indicators suggestive of malnutrition and/or morbid obesity  Malnutrition Findings:   Adult Malnutrition type: Chronic illness  Adult Degree of Malnutrition: Malnutrition of moderate degree  Malnutrition Characteristics: Inadequate energy, Weight loss, Fluid accumulation                  360 Statement: PCM r/t cancer with chemoradiation AEB, a 6 6% unintended wt loss from 01/02/23 to present and a 12 8% unintended wt loss from 08/26/22 to present and < 75% energy intake compared to estimated energy needs for > 1 month  Treating with supplements    BMI Findings: Body mass index is 24 02 kg/m²  See Nutrition note dated 03/04/2023 for additional details  Completed nutrition assessment is viewable in the nutrition documentation

## 2023-03-04 NOTE — ASSESSMENT & PLAN NOTE
Lab Results   Component Value Date    HGBA1C 7 2 (H) 10/20/2022       Recent Labs     03/01/23  1253   POCGLU 154*     Home regimen reviewed  Hold Metformin if applicable    Start SSI and Basal bolus protocol    Blood Sugar Average: Last 72 hrs:

## 2023-03-04 NOTE — ASSESSMENT & PLAN NOTE
· Baseline hemoglobin appears 7-8, likely secondary to CKD and hematuria from bladder malignancy  · Reviewed [facility] from January of 2023   Ideally hemoglobin should stay >8 given patients coronary artery disease, also discussed with patients grand daughter who is POA  · Hgb stable at 8 3 today, monitor   · Type and cross completed

## 2023-03-04 NOTE — ASSESSMENT & PLAN NOTE
Lab Results   Component Value Date    EGFR 23 03/03/2023    EGFR 19 01/24/2023    EGFR 18 01/23/2023    CREATININE 2 50 (H) 03/03/2023    CREATININE 2 96 (H) 01/24/2023    CREATININE 2 99 (H) 01/23/2023   Renal function stable

## 2023-03-04 NOTE — ASSESSMENT & PLAN NOTE
Patient presenting with sepsis of a urinary etiology    Patient [place] laboratory studies demonstrate vancomycin resistant Enterococcus  Patient to be started on Zyvox 600 mg every 12 hours  We will need to monitor platelet count, as well as for serotonin syndrome given the patient is on SSRI  Defer infectious disease consultation to primary morning team, culture data is readily available  We will give IV fluid ,, full sepsis bolus of 30 cc/kg not given due to concern for potential heart failure

## 2023-03-04 NOTE — ASSESSMENT & PLAN NOTE
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

## 2023-03-04 NOTE — ASSESSMENT & PLAN NOTE
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

## 2023-03-04 NOTE — H&P
Mian 89 1945, [de-identified] y o  male MRN: [id number]  Unit/Bed#: ED-04 Encounter: [id number]  Primary Care Provider: Nava Wilson MD   Date and time admitted to hospital: 3/3/2023  2:31 PM    Assessment and Plan  * Sepsis [facility])  Assessment & Plan  Patient presenting with sepsis of a urinary etiology  Patient [place] laboratory studies demonstrate vancomycin resistant Enterococcus  Patient to be started on Zyvox 600 mg every 12 hours  We will need to monitor platelet count, as well as for serotonin syndrome given the patient is on SSRI  Defer infectious disease consultation to primary morning team, culture data is readily available  We will give IV fluid ,, full sepsis bolus of 30 cc/kg not given due to concern for potential heart failure    CAD (coronary artery disease)  Assessment & Plan  Continue Lopressor 25 mg every 12 hours, refills of this medication on discharge be provided as he only has 50 mg tablets  Followed by [facility]  He has a hx of CAD s/p stents ( last PCI 10/2019)  Not on statin therapy due to myalgias      Cancer related pain  Assessment & Plan  Resume oxycodone as needed  Bilateral hydronephrosis  Assessment & Plan  Patient with history of bilateral hydronephrosis related to bladder cancer malignancy  Does have nephrostomy tubes in place but does make urine    Stage 4 chronic kidney disease [facility])  Assessment & Plan  Lab Results   Component Value Date    EGFR 23 03/03/2023    EGFR 19 01/24/2023    EGFR 18 01/23/2023    CREATININE 2 50 (H) 03/03/2023    CREATININE 2 96 (H) 01/24/2023    CREATININE 2 99 (H) 01/23/2023   Renal function stable  Type 2 diabetes mellitus, with long-term current use of insulin [facility])  Assessment & Plan  Lab Results   Component Value Date    HGBA1C 7 2 (H) 10/20/2022       Recent Labs     03/01/23  1253   POCGLU 154*     Home regimen reviewed  Hold Metformin if applicable    Start SSI and Basal bolus protocol    Blood Sugar Average: Last 72 hrs:      Bladder carcinoma ([facility] [address] )  Assessment & Plan  Tree of bladder carcinoma, treatment on hold due to UTI  Outpatient follow-up with [facility]        Code Status: DNR    VTE Prophylaxis: Heparin  / sequential compression device     POLST: There is no POLST form on file for this patient (pre-hospital)  Discussion with family: Wife at bedside    Anticipated Length of Stay:  Patient will be admitted on an Inpatient basis with an anticipated length of stay of  greater 2 midnights  Justification for Hospital Stay: Sepsis [facility])     Total Time for Visit, including Counseling / Coordination of Care: 1 hour  Greater than 50% of this total time spent on direct patient counseling and coordination of care  Chief Complaint:     Chief Complaint   Patient presents with   • Possible UTI     Patient with hx of cancer  Unable to undergo treatment due to reoccurring UTI  Patient with b/l nephrostomy tubes  Left flank pain       History of Present Illness:    Claudia Berman is a [de-identified] y o  male who presents with left flank pain as well as concern for urinary tract infection  She has a history of bladder cancer, he is followed by [facility]  His other medical problems include chronic kidney disease stage IV as well as diabetes mellitus and coronary artery disease  In the emergency room he was found to be tachycardic, the patient states he has not felt well since his previous discharge from the hospital   Unfortunately due to recurrent urinary tract infection which have typically been multidrug-resistant he is unable to complete chemotherapy  He does have a history of ESBL UTI, in addition to vancomycin resistant Enterococcus  The patient did have a urine test which was ordered to determine if he had a repeat UTI  This was listed under [facility] and does demonstrate vancomycin-resistant Enterococcus    The patient also has a history of diabetes mellitus  He is on oral furosemide every other day although he reports no heart failure  Denies any nausea vomiting or diarrhea  No other abdominal pain  Review of Systems:    A complete and comprehensive 14 point organ system review was performed and all other systems are negative other than stated above in the HPI    Past Medical and Surgical History:     Past Medical History:   Diagnosis Date   • Anemia     Last assessed: 9/28/17   • Anxiety    • Arteriosclerotic cardiovascular disease     Last assessed: 9/28/17   • Arthritis    • Bladder cancer ([facility] )     bladder- had BCG treatments   • Chronic kidney disease     Stage IV   • CKD (chronic kidney disease) stage 4, GFR 15-29 ml/min ([facility])    • Colon polyp    • Coronary artery disease     7 stents   • Depression    • Diabetes mellitus ([facility])     IDDM   • GERD (gastroesophageal reflux disease)    • Glaucoma    • Hematuria    • History of fusion of cervical spine    • Hyperlipidemia    • Hypertension    • Insomnia     Last assessed: 11/14/12   • Loss of hearing     has hearing aids but usually does not wear them   • Lung cancer ([facility] )    • Metastatic cancer ([facility] )    • Other seasonal allergic rhinitis     Last assessed: 2/10/16   • PAD (peripheral artery disease) ([facility])    • Shortness of breath     on exertion   • Spinal stenosis of lumbar region    • Transient cerebral ischemia     No Residual   • Uses walker     w/c for longer distances       Past Surgical History:   Procedure Laterality Date   • CARDIAC SURGERY      Cath stent placement  Last assessed: 3/9/17  Interventional Catheterization   • CHOLECYSTECTOMY     • COLONOSCOPY     • CYSTOSCOPY      Diagnostic w/biopsy  Xiomara Srinivasan  Last assessed: 12/1/14   • CYSTOSCOPY N/A 04/12/2022    Procedure: CYSTOSCOPY  Bladder biopsies  ;  Surgeon: Glenis Bustos MD;  Location: [facility] OR;  Service: Urology   • CYSTOSCOPY W/ RETROGRADES Right 03/01/2022    Procedure: CYSTO; stent removal retrograde;  Surgeon: Kannan Jose Galicia MD;  [facility];  Service: Urology   • CYSTOSCOPY W/ URETERAL STENT PLACEMENT Bilateral 10/18/2021    Procedure: bilateral retrogrades, cytology collection;  Surgeon: Shannon Roy MD;  [facility];  Service: Urology   • CYSTOURETHROSCOPY      w/cautery  Thiago Gray   • FL RETROGRADE PYELOGRAM  10/18/2021   • FL RETROGRADE PYELOGRAM  10/24/2021   • FL RETROGRADE PYELOGRAM  12/14/2022   • GASTRIC BYPASS      For morbid obesity w/Shaji-en-Y   Resolved: 11/17/09   • INCISION AND DRAINAGE OF WOUND Right 02/26/2017    Procedure: INCISION AND DRAINAGE (I&D) EXTREMITY WITH APPLICATION OF GRAFT JACKET;  Surgeon: Dominique Robins DPM;  [facility];  Service:    • INCISION AND DRAINAGE OF WOUND Right 04/25/2017    Procedure: INCISION AND DRAINAGE (I&D) EXTREMITY, APPLICATION OF GRAFT;  Surgeon: Dominique Robins DPM;  [facility];  Service:    • IR BIOPSY OTHER  07/02/2020   • IR LOWER EXTREMITY ANGIOGRAM  02/08/2021   • IR LOWER EXTREMITY ANGIOGRAM  02/11/2021   • IR NEPHROSTOMY TUBE CHECK/CHANGE/REPOSITION/REINSERTION/UPSIZE  04/28/2022   • IR NEPHROSTOMY TUBE CHECK/CHANGE/REPOSITION/REINSERTION/UPSIZE  05/24/2022   • IR NEPHROSTOMY TUBE CHECK/CHANGE/REPOSITION/REINSERTION/UPSIZE  06/07/2022   • IR NEPHROSTOMY TUBE CHECK/CHANGE/REPOSITION/REINSERTION/UPSIZE  07/28/2022   • IR NEPHROSTOMY TUBE CHECK/CHANGE/REPOSITION/REINSERTION/UPSIZE  11/15/2022   • IR NEPHROSTOMY TUBE CHECK/CHANGE/REPOSITION/REINSERTION/UPSIZE  1/10/2023   • IR NEPHROSTOMY TUBE PLACEMENT  02/25/2022   • IR PORT PLACEMENT  01/17/2022   • IR PORT REMOVAL  1/10/2023   • IR THORACENTESIS  09/02/2022   • IR THORACENTESIS  09/14/2022   • IR THORACENTESIS WITH TUBE PLACEMENT Left     october   • IR TUNNELED CENTRAL LINE PLACEMENT  12/24/2020   • JOINT REPLACEMENT      christofer knees replaced   • PA AMPUTATION METATARSAL W/TOE SINGLE Left 12/21/2020    Procedure: RAY RESECTION FOOT;  Surgeon: Alma Diaz DPM; [facility];  Service: Podiatry   • MA AMPUTATION METATARSAL W/TOE SINGLE Left 12/31/2020    Procedure: 5TH MET RESECTION;  Surgeon: Kyler Bennett DPM;  [facility];  Service: Podiatry   • MA CYSTO BLADDER W/URETERAL CATHETERIZATION Right 12/14/2022    Procedure: CYSTOSCOPY WITH RETROGRADE PYELOGRAM and CLOT EVACUATION, RIGHT STENT INSERTION, FULGRATION OF BLEEDING POINTS;  Surgeon: Estefanía Garduno MD;  [facility];  Service: Urology   • MA CYSTO W/IRRIG & EVAC MULTPLE OBSTRUCTING CLOTS N/A 02/10/2021    Procedure: CYSTOSCOPY EVACUATION OF CLOTS, fulguration;  Surgeon: Deepak Swartz MD;  [facility];  Service: Urology   • MA CYSTO W/IRRIG & EVAC MULTPLE OBSTRUCTING CLOTS N/A 10/24/2021    Procedure: CYSTOSCOPY EVACUATION OF CLOT, fulguration of bleeding vessels, right ureter stent placement, retrograde pyelogram;  Surgeon: Palmer Calixto MD;  [facility];  Service: Urology   • MA CYSTO W/REMOVAL OF LESIONS SMALL N/A 11/19/2020    Procedure: CYSTO W/BIOPSIES, transurethral prostate bx;  Surgeon: Richard Priest MD;  [facility];  Service: Urology   • MA CYSTOURETHROSCOPY W/DEST &/RMVL TUMOR LARGE Bilateral 10/18/2021    Procedure: TRANSURETHRAL RESECTION OF BLADDER TUMOR (TURBT);   Surgeon: Montez Cardoso MD;  [facility];  Service: Urology   • MA CYSTOURETHROSCOPY WITH BIOPSY N/A 08/16/2016    Procedure: Emely Box;  Surgeon: Betty Meyers MD;  [facility];  Service: Urology   • MA Lucyann Maldonado 3RD+ ORD [address] PEL/LXTR [facility] Left 02/08/2021    Procedure: LEG angiogram, CO2 w/limited contrast with balloon angioplasty postertior tibial artery;  Surgeon: Vira Avalos MD;  [facility];  Service: Vascular   • ROTATOR CUFF REPAIR     • SMALL INTESTINE SURGERY      Surgery Shaji-en-Y   • SPINAL FUSION      lumbar and cervical fusions   • VAC DRESSING APPLICATION Right 72/20/9943    Procedure: APPLICATION VAC DRESSING;  Surgeon: Yancey Crigler, DPM;  [facility];  Service:    • WOUND DEBRIDEMENT Left 02/16/2021    Procedure: FOOT DEBRIDE, [address] OUT w/graft application;  Surgeon: Yancey Crigler, DPM;  [facility];  Service: Podiatry       Meds/Allergies:    Prior to Admission medications    Medication Sig Start Date End Date Taking?  Authorizing Provider   Accu-Chek Softclix Lancets lancets Test blood sugar 4 times daily 2/23/22   Christen Garduno MD   acetaminophen (TYLENOL) 325 mg tablet Take 650 mg by mouth every 6 (six) hours as needed for mild pain      Historical Provider, MD   ARIPiprazole (ABILIFY) 2 mg tablet Take 2 mg by mouth daily    Historical Provider, MD   aspirin (ECOTRIN LOW STRENGTH) 81 mg EC tablet Take 1 tablet (81 mg total) by mouth daily 11/1/21   Christen Garduno MD   Azelastine HCl 0 15 % SOLN Inhale 1 spray 2 (two) times a day 5/14/20   Christen Garduno MD   Bimatoprost (LUMIGAN OP) Apply 1 drop to eye daily at bedtime     Historical Provider, MD   calcitriol (ROCALTROL) 0 25 mcg capsule Take 1 capsule (0 25 mcg total) by mouth daily 2/2/22   Christen Garduno MD   docusate sodium (COLACE) 100 mg capsule Take 100 mg by mouth 2 (two) times a day    Historical Provider, MD   DULoxetine (CYMBALTA) 30 mg delayed release capsule TAKE ONE CAPSULE BY MOUTH EVERY DAY 1/6/23   Christen Garduno MD   ezetimibe (ZETIA) 10 mg tablet Take 1 tablet (10 mg total) by mouth daily 10/31/22   Christen Garduno MD   Ferrous Sulfate Dried (Feosol) 200 (65 Fe) MG TABS Take 65 mg by mouth daily  Patient not taking: Reported on 2/23/2023 2/2/22   Christen Garduno MD   fluticasone [facility]) 50 mcg/act nasal spray 1 spray into each nostril as needed for allergies 11/19/22   Milana Eldridge PA-C   furosemide (LASIX) 20 mg tablet Take 1 tablet (20 mg total) by mouth daily 10/31/22   Christen Garduno MD   furosemide (LASIX) 20 mg tablet Take 20 mg by mouth 2 (two) times a day Take 1 tablet every other day  Patient not taking: Reported on 2/23/2023    Historical Provider, MD   gabapentin (NEURONTIN) 300 mg capsule TAKE ONE CAPSULE BY MOUTH EVERY DAY AT BEDTIME 1/6/23   Janak Wisdom MD   Insulin Pen Needle 31G X 8 MM MISC Inject 3 times a day 5/8/19   Janak Wisdom MD   loperamide (IMODIUM) 2 mg capsule Take 2 mg by mouth 4 (four) times a day as needed for diarrhea    Historical Provider, MD   metoprolol tartrate (LOPRESSOR) 25 mg tablet Take 1 tablet (25 mg total) by mouth every 12 (twelve) hours 1/24/23 2/23/23  Neno Underwood MD   multivitamin [facility]) TABS Take 1 tablet by mouth daily  Historical Provider, MD   omeprazole (PriLOSEC) 20 mg delayed release capsule Take 1 capsule (20 mg total) by mouth daily in the early morning PRN 10/4/22   Janak Wisdom MD   oxybutynin (DITROPAN) 5 mg tablet Take 1 tablet (5 mg total) by mouth 3 (three) times a day 1/24/23   Lana Hardy MD   oxyCODONE (OXY-IR) 5 MG capsule Take 5 mg by mouth every 4 (four) hours as needed for moderate pain    Historical Provider, MD   oxyCODONE (ROXICODONE) 10 MG TABS Take 10 mg by mouth every 8 (eight) hours as needed for moderate pain    Historical Provider, MD   polyethylene glycol (MIRALAX) 17 g packet Take 17 g by mouth daily 9/26/22 April EVANGELINA Miller   senna (SENOKOT) 8 6 MG tablet Take 2 tablets (17 2 mg total) by mouth 2 (two) times a day 9/26/22 April EVANGELINA Miller   betamethasone valerate (VALISONE) 0 1 % cream Apply topically 2 (two) times a day 4/28/21 5/18/21  Janak Wisdom MD     I have reviewed home medications with patient personally  Allergies:    Allergies   Allergen Reactions   • Atorvastatin Hives, Itching and Rash   • Simvastatin Rash and Edema     Edema of lower legs   • Statins Hives and Itching   • Insulin Lispro Swelling and Edema     " Lower Legs"   • Other Itching, Rash and Other (See Comments)     "EKG Patches"   "blue EKG patches"       Social History: Marital Status: /Civil Union   Occupation: Retired    Substance Use History:   Social History     Substance and Sexual Activity   Alcohol Use Not Currently    Comment: occasional in the past     Social History     Tobacco Use   Smoking Status Former   • Packs/day: 3 00   • Types: Cigarettes   • Start date:    • Quit date:    • Years since quittin 2   • Passive exposure: Past   Smokeless Tobacco Never     Social History     Substance and Sexual Activity   Drug Use Not Currently   • Types: Marijuana    Comment: quit 2019 had medical marijuana       Family History:    Family History   Problem Relation Age of Onset   • Diabetes Mother    • Heart disease Mother    • Other Mother         High blood pressure   • Heart disease Father    • Diabetes Sister    • Other Sister         High blood pressure   • Kidney disease Sister    • Heart disease Brother    • Other Brother         High blood pressure       Physical Exam:     Vitals:   Blood Pressure: 141/76 (23 1755)  Pulse: 98 (23)  Temperature: 97 5 °F (36 4 °C) (23)  Temp Source: Oral (23)  Respirations: 16 (23)  Weight - Scale: 89 5 kg (197 lb 5 oz) (23)  SpO2: 98 % (23)      General: ill appearing, no acute distress  HEENT: atraumatic, PERRLA, moist mucosa, normal pharynx, normal tonsils and adenoids, normal tongue, no fluid in sinuses  Neck: Trachea midline, no carotid bruit, no masses  Respiratory: normal chest wall expansion, CTA B, no r/r/w, no rubs  Cardiovascular: Tachycardic, no m/r/g, Normal S1 and S2  Abdomen: Soft, non-tender, non-distended, normal bowel sounds in all quadrants, no hepatosplenomegaly, no tympany, nephrostomy tubes present  Rectal: deferred  Musculoskeletal: moves all  Integumentary: 2+ edema  Heme/Lymph: no lymphadenopathy, no bruises  Neurological: Cranial Nerves II-XII grossly intact  Psychiatric: cooperative with normal mood, affect, and cognition    Additional Data:     Lab Results: I have personally reviewed pertinent reports  Results from last 7 days   Lab Units 03/03/23  1534   WBC Thousand/uL 14 54*   HEMOGLOBIN g/dL 9 4*   HEMATOCRIT % 29 4*   PLATELETS Thousands/uL 252   NEUTROS PCT % 77*   LYMPHS PCT % 12*   MONOS PCT % 5   EOS PCT % 5     Results from last 7 days   Lab Units 03/03/23  1754   SODIUM mmol/L 135   POTASSIUM mmol/L 4 6   CHLORIDE mmol/L 99   CO2 mmol/L 27   BUN mg/dL 50*   CREATININE mg/dL 2 50*   ANION GAP mmol/L 9   CALCIUM mg/dL 11 0*   GLUCOSE RANDOM mg/dL 129         Results from last 7 days   Lab Units 03/01/23  1253   POC GLUCOSE mg/dl 154*         Results from last 7 days   Lab Units 03/03/23  1534   LACTIC ACID mmol/L 1 1           Imaging: I have personally reviewed pertinent reports  No orders to display       EKG, Pathology, and Other Studies Reviewed on Admission:   ·   Reviewed [facility] records  Allscripts / Epic Records Reviewed: Yes     ** Please Note: This note was completed in part utilizing [employer] Direct Software  Grammatical errors, random word insertions, spelling mistakes, and incomplete sentences may be an occasional consequence of this system secondary to software limitations, ambient noise, and hardware issues  If you have any questions or concerns about the content, text, or information contained within the body of this dictation, please contact the provider for clarification  **

## 2023-03-04 NOTE — ASSESSMENT & PLAN NOTE
Lab Results   Component Value Date    HGBA1C 6 7 (H) 03/03/2023       Recent Labs     03/04/23  0729 03/04/23  1118 03/04/23  1551 03/04/23 2119   POCGLU 109 119 139 154*     · Home regimen reviewed  Hold Metformin if applicable    · Continue 10 units lantus at night, hypoglycemic protocol, carbohydrate diet  · Patient with leg edema on Lispro, will adjust night time insulin for BS control 140-180 here    Blood Sugar Average: Last 72 hrs:  (P) 147 2

## 2023-03-04 NOTE — PROGRESS NOTES
[address] [facility]  Progress Note - Desiree Swain 1945, [de-identified] y o  male MRN: [id number]  Unit/Bed#: E5 -01 Encounter: [id number]  Primary Care Provider: Jefferson Penny MD   Date and time admitted to hospital: 3/3/2023  2:31 PM    * Sepsis [facility])  Assessment & Plan  · Patient sent in to ED as requested by PCP for IV antibiotics for UTI  · Patient met sepsis criteria on admission secondary to urinary tract infection  · Lactic acid within normal limits  · Previous [facility] studies with vancomycin resistant Enterococcus and susceptible to Linezolid  · Continue Linezolid 600 mg every 12 hours day #2 with close monitoring of platelet count and signs of serotonin syndrome  · Patient afebrile with chronically elevated white count  · Blood cultures pending  · Urine cultures here pending  · Urinary retention protocol, I/O's  · Will consult infectious disease given enterococcus resistant organism and treatment with Linezolid  · Wait to resume home Cymbalta 24 hours after completion of Linezolid    Urinary tract infection  Assessment & Plan  · See above  · Follow up urine cultures    Bladder carcinoma ([facility] [address] )  Assessment & Plan  · 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, recurrence with CIS in 2016 BCG again  BCG refractory disease with pelvic metastases now s/p chemoradiation 2021, treatment on hold due to UTI  · With history of hematuria requiring PRBC transfusions, monitor  · Recent NM PET CT on 3/1 reviewed and discussed with patient   He is aware there is severe disease progression  · Patient has outpatient follow-up with [facility]  · Will provide palliative care referral on discharge after discussion with patient    Bilateral hydronephrosis  Assessment & Plan  · With history of bilateral hydronephrosis related to bladder cancer malignancy  · Chronic bilateral nephrostomy tubes in place  · Patient known to urology with instruction to maintain bilateral nephrostomy tubes by daily washing and cleansing  We will consult urology for further discussion of stent care given patients infection status  · CT abd/pelvis Jan 2023 reviewed "Bilateral percutaneous nephrostomy tubes in expected position as well as right double-J ureteral stent"    Stage 4 chronic kidney disease [facility])  Assessment & Plan  Lab Results   Component Value Date    EGFR 24 03/04/2023    EGFR 23 03/03/2023    EGFR 19 01/24/2023    CREATININE 2 39 (H) 03/04/2023    CREATININE 2 50 (H) 03/03/2023    CREATININE 2 96 (H) 01/24/2023   · baseline Cr appears around 2 5-3, at baseline  · Continue to monitor  · I/O    Anemia  Assessment & Plan  · Baseline hemoglobin appears 7-8, likely secondary to CKD and hematuria from bladder malignancy  · Hgb stable at 8 5 today, monitor     CAD (coronary artery disease)  Assessment & Plan  · Continue Lopressor 25 mg every 12 hours (refills of this medication on discharge be provided as he only has 50 mg tablets), aspirin and Zetia  · Not on statin due to myalgias  · Without chest pain  · Followed by [facility]  He has a hx of CAD s/p stents (last PCI 10/2019)      Type 2 diabetes mellitus, with long-term current use of insulin [facility])  Assessment & Plan  Lab Results   Component Value Date    HGBA1C 6 7 (H) 03/03/2023       Recent Labs     03/03/23  2102 03/04/23  0729 03/04/23  1118 03/04/23  1551   POCGLU 215* 109 119 139     · Home regimen reviewed  Hold Metformin if applicable    · Continue 10 units lantus at night, SSI, hypoglycemic protocol, carbohydrate diet    Blood Sugar Average: Last 72 hrs:  (P) 145 5    Moderate protein-calorie malnutrition (HCC)  Assessment & Plan  Malnutrition Findings:   Adult Malnutrition type: Chronic illness  Adult Degree of Malnutrition: Malnutrition of moderate degree  Malnutrition Characteristics: Inadequate energy, Weight loss, Fluid accumulation    360 Statement: PCM r/t cancer with chemoradiation AEB, a 6 6% unintended wt loss from 01/02/23 to present and a 12 8% unintended wt loss from 08/26/22 to present and < 75% energy intake compared to estimated energy needs for > 1 month  BMI Findings: Body mass index is 24 02 kg/m²  Cancer related pain  Assessment & Plan  · Resume oxycodone prn, PDMP reviewed    VTE Pharmacologic Prophylaxis: VTE Score: 8 High Risk (Score >/= 5) - Pharmacological DVT Prophylaxis Ordered: heparin  Sequential Compression Devices Ordered  Patient Centered Rounds: I performed bedside rounds with nursing staff today  Discussions with Specialists or Other Care Team Provider:     Education and Discussions with Family / Patient: Updated  (wife) via phone  No answer, will try later  Total Time Spent on Date of Encounter in care of patient: 55 minutes This time was spent on one or more of the following: performing physical exam; counseling and coordination of care; obtaining or reviewing history; documenting in the medical record; reviewing/ordering tests, medications or procedures; communicating with other healthcare professionals and discussing with patient's family/caregivers  Current Length of Stay: 1 day(s)  Current Patient Status: Inpatient   Certification Statement: The patient will continue to require additional inpatient hospital stay due to UTI treatment, urology consult, pending cultures  Discharge Plan: pending cultures and urology input    Code Status: Level 3 - DNAR and DNI    Subjective:   Patient seen and examined  Reports he has had some burning with urination  Had some blood in his urine yesterday but did not notice any today  Denies any fever, chills, abdominal pain, nausea or vomiting  He is without diarrhea, feeling agitated or with tremors on the antibiotic  He notes that he is constipated at times  Cancer treatment has been on hold since he has been hospitalized so much    Denies any other signs of bleeding such as increased bruising or coughing up blood  Objective:     Vitals:   Temp (24hrs), Av 8 °F (36 6 °C), Min:97 2 °F (36 2 °C), Max:98 2 °F (36 8 °C)    Temp:  [97 2 °F (36 2 °C)-98 2 °F (36 8 °C)] 97 9 °F (36 6 °C)  HR:  [] 84  Resp:  [16-18] 16  BP: (121-146)/(68-79) 121/77  SpO2:  [98 %] 98 %  Body mass index is 24 02 kg/m²  Input and Output Summary (last 24 hours): Intake/Output Summary (Last 24 hours) at 3/4/2023 1630  Last data filed at 3/4/2023 1347  Gross per 24 hour   Intake 120 ml   Output 1500 ml   Net -1380 ml       Physical Exam:   Physical Exam  Vitals and nursing note reviewed  Constitutional:       General: He is not in acute distress  Appearance: Normal appearance  He is well-developed  He is not ill-appearing  HENT:      Head: Normocephalic and atraumatic  Eyes:      Extraocular Movements: Extraocular movements intact  Conjunctiva/sclera: Conjunctivae normal       Pupils: Pupils are equal, round, and reactive to light  Cardiovascular:      Rate and Rhythm: Normal rate and regular rhythm  Heart sounds: Murmur (2/6 systolic murmur) heard  Pulmonary:      Effort: Pulmonary effort is normal  No respiratory distress  Breath sounds: Normal breath sounds  No wheezing or rales  Abdominal:      General: Bowel sounds are normal  There is no distension  Palpations: Abdomen is soft  Tenderness: There is no abdominal tenderness  There is no guarding  Genitourinary:     Comments: B/l nephrostomy tubes in place, draining without signs of infection  Musculoskeletal:      Right lower leg: No edema  Left lower leg: No edema  Skin:     General: Skin is warm and dry  Neurological:      Mental Status: He is alert and oriented to person, place, and time        Comments: No tremors or agitation noted   Psychiatric:         Mood and Affect: Mood normal          Behavior: Behavior normal           Additional Data:     Labs:  Results from last 7 days   Lab Units 03/04/23  0617 03/03/23  1534   WBC Thousand/uL 15 79* 14 54*   HEMOGLOBIN g/dL 8 5* 9 4*   HEMATOCRIT % 26 3* 29 4*   PLATELETS Thousands/uL 251 252   NEUTROS PCT %  --  77*   LYMPHS PCT %  --  12*   MONOS PCT %  --  5   EOS PCT %  --  5     Results from last 7 days   Lab Units 03/04/23  0617   SODIUM mmol/L 134*   POTASSIUM mmol/L 4 6   CHLORIDE mmol/L 101   CO2 mmol/L 25   BUN mg/dL 48*   CREATININE mg/dL 2 39*   ANION GAP mmol/L 8   CALCIUM mg/dL 10 1   GLUCOSE RANDOM mg/dL 109         Results from last 7 days   Lab Units 03/04/23  1551 03/04/23  1118 03/04/23  0729 03/03/23  2102 03/01/23  1253   POC GLUCOSE mg/dl 139 119 109 215* 154*     Results from last 7 days   Lab Units 03/03/23  1534   HEMOGLOBIN A1C % 6 7*     Results from last 7 days   Lab Units 03/03/23  1534   LACTIC ACID mmol/L 1 1       Lines/Drains:  Invasive Devices     Peripheral Intravenous Line  Duration           Peripheral IV 03/04/23 Right Antecubital <1 day          Drain  Duration           Urethral Catheter Three way 20 Fr  55 days    Nephrostomy Left 10 2 Fr  53 days    Nephrostomy Right 10 2 Fr  53 days              Urinary Catheter:  Goal for removal: Patient without wagner at this time    Imaging: Reviewed radiology reports from this admission including: No pertinent imaging taken here  Reviewed recent studies including CT abd/pelvis, echo, and NM PET scan    Recent Cultures (last 7 days):   Results from last 7 days   Lab Units 03/03/23  1646 03/03/23  1534   BLOOD CULTURE  Received in Microbiology Lab  Culture in Progress  Received in Microbiology Lab  Culture in Progress         Last 24 Hours Medication List:   Current Facility-Administered Medications   Medication Dose Route Frequency Provider Last Rate   • acetaminophen  650 mg Oral Q6H PRN Deanna Reed, DO     • aluminum-magnesium hydroxide-simethicone  30 mL Oral Q6H PRN Deanna Reed, DO     • ARIPiprazole  2 mg Oral Daily Piyush Reyes, DO     • aspirin  81 mg Oral Daily Piyush Jocelin Loots, DO     • bimatoprost  1 drop Both Eyes HS Piyush Jocelin Loots, DO     • docusate sodium  100 mg Oral BID Cherri Lowe, DO     • ezetimibe  10 mg Oral Daily Piyush Jocelin Loots, DO     • gabapentin  300 mg Oral HS Piyush Jocelin Loots, DO     • heparin (porcine)  5,000 Units Subcutaneous On license of [facility] Piyush Jocelin Loots, DO     • insulin glargine  10 Units Subcutaneous HS Cherri Lowe, DO     • linezolid  600 mg Oral Q12H Piyush Jocelin Loots, DO     • metoprolol tartrate  25 mg Oral Q12H [facility] Piyush Jocelin Loots, DO     • ondansetron  4 mg Intravenous Q6H PRN Cherri Lowe, DO     • oxybutynin  5 mg Oral TID Cherri Lowe, DO     • oxyCODONE  5 mg Oral Q4H PRN Cherri Lowe, DO     • pantoprazole  40 mg Oral Daily Before Breakfast Piyush Jocelin Loots, DO     • simethicone  80 mg Oral 4x Daily PRN Pyiush Monreal Loots, DO          Today, Patient Was Seen By: Nishant Montana PA-C    **Please Note: This note may have been constructed using a voice recognition system  **

## 2023-03-04 NOTE — ASSESSMENT & PLAN NOTE
Patient with history of bilateral hydronephrosis related to bladder cancer malignancy  Does have nephrostomy tubes in place but does make urine

## 2023-03-04 NOTE — ASSESSMENT & PLAN NOTE
Tree of bladder carcinoma, treatment on hold due to UTI  Outpatient follow-up with [facility]

## 2023-03-04 NOTE — ASSESSMENT & PLAN NOTE
Continue Lopressor 25 mg every 12 hours, refills of this medication on discharge be provided as he only has 50 mg tablets  Followed by [facility]   He has a hx of CAD s/p stents ( last PCI 10/2019)  Not on statin therapy due to myalgias

## 2023-03-04 NOTE — ASSESSMENT & PLAN NOTE
Lab Results   Component Value Date    HGBA1C 6 7 (H) 03/03/2023       Recent Labs     03/01/23  1253 03/03/23  2102 03/04/23  0729   POCGLU 154* 215* 109     · Home regimen reviewed  Hold Metformin if applicable    · Continue 10 units lantus at night, SSI, hypoglycemic protocol, carbohydrate diet    Blood Sugar Average: Last 72 hrs:  (P) 162

## 2023-03-05 ENCOUNTER — APPOINTMENT (INPATIENT)
Dept: RADIOLOGY | Facility: HOSPITAL | Age: 78
End: 2023-03-05

## 2023-03-05 LAB
ABO GROUP BLD: NORMAL
ANION GAP SERPL CALCULATED.3IONS-SCNC: 7 MMOL/L (ref 4–13)
BLD GP AB SCN SERPL QL: NEGATIVE
BUN SERPL-MCNC: 47 MG/DL (ref 5–25)
CALCIUM SERPL-MCNC: 10.3 MG/DL (ref 8.4–10.2)
CHLORIDE SERPL-SCNC: 101 MMOL/L (ref 96–108)
CO2 SERPL-SCNC: 25 MMOL/L (ref 21–32)
CREAT SERPL-MCNC: 2.4 MG/DL (ref 0.6–1.3)
ERYTHROCYTE [DISTWIDTH] IN BLOOD BY AUTOMATED COUNT: 16.1 % (ref 11.6–15.1)
GFR SERPL CREATININE-BSD FRML MDRD: 24 ML/MIN/1.73SQ M
GLUCOSE SERPL-MCNC: 113 MG/DL (ref 65–140)
GLUCOSE SERPL-MCNC: 120 MG/DL (ref 65–140)
GLUCOSE SERPL-MCNC: 83 MG/DL (ref 65–140)
GLUCOSE SERPL-MCNC: 90 MG/DL (ref 65–140)
GLUCOSE SERPL-MCNC: 99 MG/DL (ref 65–140)
HCT VFR BLD AUTO: 26.1 % (ref 36.5–49.3)
HGB BLD-MCNC: 8.3 G/DL (ref 12–17)
MCH RBC QN AUTO: 29.1 PG (ref 26.8–34.3)
MCHC RBC AUTO-ENTMCNC: 31.8 G/DL (ref 31.4–37.4)
MCV RBC AUTO: 92 FL (ref 82–98)
PLATELET # BLD AUTO: 247 THOUSANDS/UL (ref 149–390)
PMV BLD AUTO: 10.5 FL (ref 8.9–12.7)
POTASSIUM SERPL-SCNC: 4.7 MMOL/L (ref 3.5–5.3)
RBC # BLD AUTO: 2.85 MILLION/UL (ref 3.88–5.62)
RH BLD: POSITIVE
SODIUM SERPL-SCNC: 133 MMOL/L (ref 135–147)
SPECIMEN EXPIRATION DATE: NORMAL
WBC # BLD AUTO: 15.56 THOUSAND/UL (ref 4.31–10.16)

## 2023-03-05 RX ORDER — POLYETHYLENE GLYCOL 3350 17 G/17G
17 POWDER, FOR SOLUTION ORAL 2 TIMES DAILY
Status: DISCONTINUED | OUTPATIENT
Start: 2023-03-05 | End: 2023-03-13 | Stop reason: HOSPADM

## 2023-03-05 RX ORDER — ACETAMINOPHEN 325 MG/1
975 TABLET ORAL EVERY 8 HOURS PRN
Status: DISCONTINUED | OUTPATIENT
Start: 2023-03-05 | End: 2023-03-13 | Stop reason: HOSPADM

## 2023-03-05 RX ORDER — LIDOCAINE 50 MG/G
1 PATCH TOPICAL DAILY
Status: DISCONTINUED | OUTPATIENT
Start: 2023-03-05 | End: 2023-03-13 | Stop reason: HOSPADM

## 2023-03-05 RX ADMIN — DOCUSATE SODIUM 100 MG: 100 CAPSULE, LIQUID FILLED ORAL at 09:42

## 2023-03-05 RX ADMIN — POLYETHYLENE GLYCOL 3350 17 G: 17 POWDER, FOR SOLUTION ORAL at 21:33

## 2023-03-05 RX ADMIN — LINEZOLID 600 MG: 600 TABLET, FILM COATED ORAL at 05:15

## 2023-03-05 RX ADMIN — METOPROLOL TARTRATE 25 MG: 25 TABLET, FILM COATED ORAL at 21:33

## 2023-03-05 RX ADMIN — OXYCODONE 5 MG: 5 TABLET ORAL at 05:15

## 2023-03-05 RX ADMIN — OXYBUTYNIN CHLORIDE 5 MG: 5 TABLET ORAL at 15:05

## 2023-03-05 RX ADMIN — ARIPIPRAZOLE 2 MG: 2 TABLET ORAL at 09:42

## 2023-03-05 RX ADMIN — EZETIMIBE 10 MG: 10 TABLET ORAL at 09:42

## 2023-03-05 RX ADMIN — POLYETHYLENE GLYCOL 3350 17 G: 17 POWDER, FOR SOLUTION ORAL at 09:42

## 2023-03-05 RX ADMIN — DOCUSATE SODIUM 100 MG: 100 CAPSULE, LIQUID FILLED ORAL at 17:57

## 2023-03-05 RX ADMIN — OXYBUTYNIN CHLORIDE 5 MG: 5 TABLET ORAL at 09:42

## 2023-03-05 RX ADMIN — OXYCODONE 5 MG: 5 TABLET ORAL at 11:59

## 2023-03-05 RX ADMIN — PANTOPRAZOLE SODIUM 40 MG: 40 TABLET, DELAYED RELEASE ORAL at 09:42

## 2023-03-05 RX ADMIN — ACETAMINOPHEN 325MG 975 MG: 325 TABLET ORAL at 11:59

## 2023-03-05 RX ADMIN — LINEZOLID 600 MG: 600 TABLET, FILM COATED ORAL at 17:57

## 2023-03-05 RX ADMIN — METOPROLOL TARTRATE 25 MG: 25 TABLET, FILM COATED ORAL at 09:42

## 2023-03-05 RX ADMIN — OXYBUTYNIN CHLORIDE 5 MG: 5 TABLET ORAL at 21:33

## 2023-03-05 RX ADMIN — HEPARIN SODIUM 5000 UNITS: 5000 INJECTION INTRAVENOUS; SUBCUTANEOUS at 21:33

## 2023-03-05 RX ADMIN — OXYCODONE 5 MG: 5 TABLET ORAL at 23:49

## 2023-03-05 RX ADMIN — GABAPENTIN 300 MG: 300 CAPSULE ORAL at 21:34

## 2023-03-05 RX ADMIN — ACETAMINOPHEN 325MG 975 MG: 325 TABLET ORAL at 23:48

## 2023-03-05 RX ADMIN — LIDOCAINE 5% 1 PATCH: 700 PATCH TOPICAL at 11:59

## 2023-03-05 RX ADMIN — OXYCODONE 5 MG: 5 TABLET ORAL at 19:01

## 2023-03-05 RX ADMIN — HEPARIN SODIUM 5000 UNITS: 5000 INJECTION INTRAVENOUS; SUBCUTANEOUS at 15:05

## 2023-03-05 RX ADMIN — ASPIRIN 81 MG: 81 TABLET, COATED ORAL at 09:42

## 2023-03-05 RX ADMIN — HEPARIN SODIUM 5000 UNITS: 5000 INJECTION INTRAVENOUS; SUBCUTANEOUS at 05:15

## 2023-03-05 RX ADMIN — BIMATOPROST 1 DROP: 0.1 SOLUTION/ DROPS OPHTHALMIC at 21:33

## 2023-03-05 NOTE — PHYSICAL THERAPY NOTE
Physical Therapy Evaluation:    2 forms of pt ID verified:name,birthdate and pt ID katerine    Patient's Name: Lanre Morley    Admitting Diagnosis  UTI (urinary tract infection) [N39 0]  Urinary tract infection [N39 0]    Problem List  Patient Active Problem List   Diagnosis    Coronary artery disease of native artery of native heart with stable angina pectoris ([facility] )    Bladder carcinoma ([facility] )    Hypertension with renal disease    Hyperlipidemia    Presence of stent in coronary artery    Type 2 diabetes mellitus, with long-term current use of insulin ([facility])    Primary osteoarthritis of left knee    Cystitis due to intravesical BCG administration    Degeneration of lumbar intervertebral disc    Back pain    Arteriosclerosis of artery of extremity ([facility])    Stable angina pectoris ([facility])    Herpes zoster without complication    Localized edema    Superficial phlebitis    Preop examination    Secondary renal hyperparathyroidism ([facility] )    Malignant neoplasm of anterior wall of urinary bladder ([facility])    Abnormal MRI, lumbar spine    Diabetic polyneuropathy associated with type 2 diabetes mellitus ([facility] )    Dysuria    Diabetic ulcer of left foot associated with type 2 diabetes mellitus, 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 ([facility] )    Acute kidney injury superimposed on CKD ([facility])    Anemia    Anemia of chronic disease    Preoperative clearance    PAD (peripheral artery disease) ([facility])    Left carotid bruit    Gross hematuria    Chronic bronchitis ([facility])    Moderate major depression, single episode ([facility])    Thrombocytopenia ([facility])    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    Lumbar spondylosis    Chronic pain syndrome    Acute urinary retention    Hematuria    Continuous opioid dependence ([facility] )    Port-A-Cath in place    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[facility])    Embolism and 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 ([facility])    Abnormal CT scan, bladder    Retained ureteral stent    Fall    Hyperkalemia    Stage 4 chronic kidney disease ([facility] )    Bilateral hydronephrosis    Cancer related pain    Syncope and collapse    Depression with suicidal ideation    Pulmonary nodules    Retroperitoneal lymphadenopathy    SOB (shortness of breath)    History of tobacco use disorder    Chronic pleural effusion    Urinary tract infection    Right wrist drop    Insomnia    Right sided weakness    Stroke-like symptoms    Symptomatic hypotension    Elevated troponin    Influenza A    Ambulatory dysfunction    Elevated brain natriuretic peptide (BNP) level    Nephrostomy status (HCC)    Acute on chronic blood loss anemia    Bacteremia due to Enterobacter species    History of pleural effusion    Severe protein-calorie malnutrition ([facility])    Sepsis ([facility])    CAD (coronary artery disease)    Moderate protein-calorie malnutrition ([facility] )       Past Medical History  Past Medical History:   Diagnosis Date    Anemia     Last assessed: 9/28/17    Anxiety     Arteriosclerotic cardiovascular disease     Last assessed: 9/28/17    Arthritis     Bladder cancer ([facility] )     bladder- had BCG treatments    Chronic kidney disease     Stage IV    CKD (chronic kidney disease) stage 4, GFR 15-29 ml/min ([facility])     Colon polyp     Coronary artery disease     7 stents    Depression     Diabetes mellitus ([facility])     IDDM    GERD (gastroesophageal reflux disease)     Glaucoma     Hematuria     History of fusion of cervical spine     Hyperlipidemia     Hypertension     Insomnia     Last assessed: 11/14/12    Loss of hearing     has hearing aids but usually does not wear them    Lung cancer ([facility] )     Metastatic cancer ([facility] )     Other seasonal allergic rhinitis     Last assessed: 2/10/16    PAD (peripheral artery disease) ([facility])     Shortness of breath     on exertion    Spinal stenosis of lumbar region     Transient cerebral ischemia     No Residual    Uses walker     w/c for longer distances       Past Surgical History  Past Surgical History:   Procedure Laterality Date    CARDIAC SURGERY      Cath stent placement  Last assessed: 3/9/17  Interventional Catheterization    CHOLECYSTECTOMY      COLONOSCOPY      CYSTOSCOPY      Diagnostic w/biopsy  Elvan Civil  Last assessed: 12/1/14    CYSTOSCOPY N/A 04/12/2022    Procedure: CYSTOSCOPY  Bladder biopsies  ;  Surgeon: Harry Breen MD;  [facility];  Service: Urology    CYSTOSCOPY W/ RETROGRADES Right 03/01/2022    Procedure: CYSTO; stent removal retrograde;  Surgeon: Harry Breen MD;  [facility];  Service: Urology    CYSTOSCOPY W/ URETERAL STENT PLACEMENT Bilateral 10/18/2021    Procedure: bilateral retrogrades, cytology collection;  Surgeon: Harry Breen MD;  [facility];  Service: Urology    CYSTOURETHROSCOPY      w/cautery  van Civil    FL RETROGRADE PYELOGRAM  10/18/2021    FL RETROGRADE PYELOGRAM  10/24/2021    FL RETROGRADE PYELOGRAM  12/14/2022    GASTRIC BYPASS      For morbid obesity w/Shaji-en-Y   Resolved: 11/17/09    INCISION AND DRAINAGE OF WOUND Right 02/26/2017    Procedure: INCISION AND DRAINAGE (I&D) EXTREMITY WITH APPLICATION OF GRAFT JACKET;  Surgeon: Chet Gillis, DPM;  [facility];  Service:     INCISION AND DRAINAGE OF WOUND Right 04/25/2017    Procedure: INCISION AND DRAINAGE (I&D) EXTREMITY, APPLICATION OF GRAFT;  Surgeon: Chet Gillis DPM;  [facility];  Service:     IR BIOPSY OTHER  07/02/2020    IR LOWER EXTREMITY ANGIOGRAM  02/08/2021    IR LOWER EXTREMITY ANGIOGRAM  02/11/2021    IR NEPHROSTOMY TUBE CHECK/CHANGE/REPOSITION/REINSERTION/UPSIZE  04/28/2022    IR NEPHROSTOMY TUBE CHECK/CHANGE/REPOSITION/REINSERTION/UPSIZE  05/24/2022    IR NEPHROSTOMY TUBE CHECK/CHANGE/REPOSITION/REINSERTION/UPSIZE  06/07/2022    IR NEPHROSTOMY TUBE CHECK/CHANGE/REPOSITION/REINSERTION/UPSIZE  07/28/2022    IR NEPHROSTOMY TUBE CHECK/CHANGE/REPOSITION/REINSERTION/UPSIZE  11/15/2022    IR NEPHROSTOMY TUBE CHECK/CHANGE/REPOSITION/REINSERTION/UPSIZE  1/10/2023    IR NEPHROSTOMY TUBE PLACEMENT  02/25/2022    IR PORT PLACEMENT  01/17/2022    IR PORT REMOVAL  1/10/2023    IR THORACENTESIS  09/02/2022    IR THORACENTESIS  09/14/2022    IR THORACENTESIS WITH TUBE PLACEMENT Left     october    IR TUNNELED CENTRAL LINE PLACEMENT  12/24/2020    JOINT REPLACEMENT      christofer knees replaced    MN AMPUTATION METATARSAL W/TOE SINGLE Left 12/21/2020    Procedure: RAY RESECTION FOOT;  Surgeon: Chula Macario DPM;  [facility];  Service: Podiatry    MN AMPUTATION METATARSAL W/TOE SINGLE Left 12/31/2020    Procedure: 5TH MET RESECTION;  Surgeon: Chula Macario DPM;  [facility];  Service: Podiatry    MN CYSTO BLADDER W/URETERAL CATHETERIZATION Right 12/14/2022    Procedure: CYSTOSCOPY WITH RETROGRADE PYELOGRAM and CLOT EVACUATION, RIGHT STENT INSERTION, FULGRATION OF BLEEDING POINTS;  Surgeon: Shanta Jacobo MD;  [facility];  Service: Urology    MN CYSTO W/IRRIG & EVAC MULTPLE OBSTRUCTING CLOTS N/A 02/10/2021    Procedure: CYSTOSCOPY EVACUATION OF CLOTS, fulguration;  Surgeon: Krystal Torres MD;  [facility];  Service: Urology    MN CYSTO W/IRRIG & EVAC MULTPLE OBSTRUCTING CLOTS N/A 10/24/2021    Procedure: CYSTOSCOPY EVACUATION OF CLOT, fulguration of bleeding vessels, right ureter stent placement, retrograde pyelogram;  Surgeon: Lowell Gray MD;  [facility];  Service: Urology    MN CYSTO W/REMOVAL OF LESIONS SMALL N/A 11/19/2020    Procedure: CYSTO W/BIOPSIES, transurethral prostate bx;  Surgeon: Gemma Oden MD;  [facility];  Service: Urology    MN CYSTOURETHROSCOPY W/DEST &/RMVL TUMOR LARGE Bilateral 10/18/2021    Procedure: TRANSURETHRAL RESECTION OF BLADDER TUMOR (TURBT);   Surgeon: Fatuma Hernandez MD;  [facility];  Service: Urology    MN CYSTOURETHROSCOPY WITH BIOPSY N/A 08/16/2016    Procedure: Taj Current;  Surgeon: Alberto Thapa MD;  [facility];  Service: Urology    MN Jennet Ray 3RD+ ORD SLCTV ABDL PEL/LXTR [facility] Left 02/08/2021    Procedure: LEG angiogram, CO2 w/limited contrast with balloon angioplasty postertior tibial artery;  Surgeon: Eric Montesinos MD;  [facility];  Service: Vascular    ROTATOR CUFF REPAIR      SMALL INTESTINE SURGERY      Surgery Shaji-en-Y    SPINAL FUSION      lumbar and cervical fusions    VAC DRESSING APPLICATION Right 21/06/4481    Procedure: APPLICATION VAC DRESSING;  Surgeon: Meagan Rhodes DPM;  [facility];  Service:     WOUND DEBRIDEMENT Left 02/16/2021    Procedure: FOOT DEBRIDE, [address] OUT w/graft application;  Surgeon: Meagan Rhodes DPM;  [facility];  Service: Podiatry        03/05/23 1158   PT Last Visit   PT Visit Date 03/05/23   Note Type   Note type Evaluation   Pain Assessment   Pain Assessment Tool 0-10   Pain Score 4   Pain Location/Orientation Orientation: Left;Orientation: Bilateral;Location: Back  (B back location of nephrostomy tubes and L flank area)   Hospital Pain Intervention(s) Repositioned; Ambulation/increased activity; Elevated; Emotional support; Rest   Restrictions/Precautions   Other Precautions Bed Alarm;Multiple lines;Telemetry; Fall Risk;Pain   Home Living   Type of [address] One level;Performs ADLs on one level;Stairs to enter with rails  ((+)[place], first floor)   Home Equipment Walker;Cane;Wheelchair-manual;Other (Comment)  [facility])   Additional Comments pt resides with wife and S-I-L  Pt receives A from family for ADLs and IADLs and mobility  Pt reports "soon I will get aides from the [place]"  "I was getting therapy at home prior to me being here"  Pt reports "I need to get to an appointment for my left leg brace  It's just so many things are going on right now, its not on the top of the list of things to do"  Prior Function   Level of [place] Needs assistance with ADLs; Needs assistance with functional mobility; Needs assistance with IADLS   Lives With Family; Spouse  (S-I-L)   Receives Help From Family  (local family A as needed, son and sister)   IADLs Family/Friend/Other provides medication management; Family/Friend/Other provides meals; Family/Friend/Other provides transportation   Falls in the last 6 months 5 to 10  (pt reports x5 falls)   General   Additional Pertinent History sepsis, UTI, c/o dysuria, bladder carcinoma, B hydronephrosis, stage IV CKD, CAD,anemia, DM2, CA related pain, chronic pleural effusion   Family/Caregiver Present No   Cognition   Overall Cognitive Status WFL   Arousal/Participation Cooperative   Attention Attends with cues to redirect   Orientation Level Oriented X4   Following Commands Follows one step commands with increased time or repetition  (2* pain)   Comments pt reports "not walking" at home PTA  Pt reports working on transfers with home therapy PTA  Main means of mobility with use of wc   Subjective   Subjective Pt supine in bed resting comfortably;pt willing and agreeable to work with PT and to participate in therapy intervention  "I can try, but I dont think I am going home today  They need to do more tests"  RLE Assessment   RLE Assessment   (at least 3+/5 grossly throughout)   LLE Assessment   LLE Assessment   (at least 3/5 grossly throughout)   Coordination   Movements are Fluid and Coordinated 0   Coordination and Movement Description ataxic and unsteady movement patterns, inc L ankle inversion and dec foot flat of LLE during WB (no brace available),pain,dec BLE step length   Sensation WFL   Light Touch   RLE Light Touch Grossly intact   LLE Light Touch Grossly intact   Bed Mobility   Supine to Sit 5  Supervision   Additional items HOB elevated; Bedrails; Increased time required;Verbal cues   Sit to Supine 5  Supervision   Additional items Assist x 1;Bedrails; Increased time required;Verbal cues   Transfers   Sit to Stand 3  Moderate assistance   Additional items Assist x 1;Bedrails; Increased time required;Verbal cues   Stand to Sit 3  Moderate assistance Additional items Assist x 1;Bedrails; Increased time required;Verbal cues   Ambulation/Elevation   Gait pattern Poor UE support; Improper Weight shift; Antalgic;Narrow ROSALIA; Forward Flexion;Decreased L stance; Inconsistent eliecer; Foward flexed; Short stride; Ataxia; Step to;Excessively slow   Gait Assistance 3  Moderate assist   Additional items Assist x 1;Verbal cues; Tactile cues   Assistive Device Rolling walker  (in front for stability and support)   Distance 5 sidesteps towards [facility] with use of RW;limited ambulation distance 2* inc fatigue, inc pain and dec WB LLE with unsteady movement of L ankle   Balance   Static Sitting Fair   Dynamic Sitting Poor   Static Standing Poor   Dynamic Standing Poor   Ambulatory Poor   Endurance Deficit   Endurance Deficit Yes   Endurance Deficit Description dec activity tolerance, pain   Activity Tolerance   Activity Tolerance Patient limited by fatigue;Patient limited by pain  (poor)   Medical Staff Made Aware OTTERESA Saeed   Nurse Made Aware yes Romayne Forest)   Assessment   Prognosis Guarded   Problem List Decreased strength;Decreased endurance; Impaired balance;Decreased mobility; Decreased coordination;Decreased skin integrity;Pain   Assessment Pt is a [de-identified] yo male admitted [facility] 2* sepsis, UTI, c/o dysuria, bladder CA, B hydronephrosis, stage IV CKD, CA related pain, chronic pleural effusion   Barriers to Discharge Inaccessible home environment  ((+)[place])   Goals   Patient Goals to return home   STG Expiration Date 03/15/23   Short Term Goal #1 in 7-10 days:  (1) Pt will be able to ambulate greater than 10 feet with chair follow and  with use of RW on various surfaces needing modAx1 in order to A pt to return to PLOF, (2) activity tolerance:45 mins/45mins, (3) pt will be able to perform sit to stand transfers needing min Ax1 to and from various surfaces consistently in order to return to PLOF, (4) pt will be able to perform BM needing mod (I) level of A to A pt to return to PLOF, (5) S to being completely (I) with BLE therapeutic ex HEP in various positions to A pt to inc balance,strength,mobility,endurance and to A to dec pain, (6) inc balance 1/2 grade in order to dec fall risk, (8) cont to provide pt and pt family education for safe D/C planning, (9) inc BLE strength 1/2 to 1 full grade in order to A pt to inc balance,strength,mobility,endurance and to A to dec pain, Pt will be able to perform wc mobility greater than 100 feet and wc management min Ax1   PT Treatment Day 0   Plan   Treatment/Interventions Functional transfer training;LE strengthening/ROM; Therapeutic exercise; Endurance training;Patient/family training;Equipment eval/education; Bed mobility;Gait training;Spoke to nursing;OT   PT Frequency 3-5x/wk   Recommendation   PT Discharge Recommendation Post acute rehabilitation services  (rehab vs HHPT PENDING mobility progress and available A from family)   Equipment Recommended Nessa; Wheelchair  (pt owns walker and wc at home PTA)   Victor M 8 in Flat Bed Without Bedrails 3   Lying on Back to Sitting on Edge of Flat Bed Without Bedrails 3   Moving Bed to Chair 2   Standing Up From Chair Using Arms 2   Walk in Room 1   Climb 3-5 Stairs With Railing 1   Basic Mobility Inpatient Raw Score 12   Basic Mobility Standardized Score 32 23   Highest Level Of Mobility   JH-HLM Goal 4: Move to chair/commode   JH-HLM Achieved 5: Stand (1 or more minutes)           @Carrol Weber, PT, DPT@

## 2023-03-05 NOTE — PROGRESS NOTES
[address] [facility]  Progress Note - Brandee Desouza 1945, [de-identified] y o  male MRN: [id number]  Unit/Bed#: E5 -01 Encounter: [id number]  Primary Care Provider: Michael Ochoa MD   Date and time admitted to hospital: 3/3/2023  2:31 PM    * Sepsis [facility])  Assessment & Plan  · Patient sent in to ED as requested by PCP for IV antibiotics for UTI, patient complaining of dysuria  · Patient met sepsis criteria on admission secondary to urinary tract infection  · Lactic acid within normal limits  · Previous [place] laboratory urine culture 2/27 with vancomycin resistant Enterococcus and susceptible to Linezolid  · Continue Linezolid 600 mg every 12 hours day #3 with close monitoring of platelet count and signs of serotonin syndrome  · Patient afebrile with chronically elevated white count  · Blood cultures negative x 24 hours  · Urine culture with mixed contaminants here  · Urinary retention protocol, I/O's  · Appreciate infectious disease input enterococcus resistant organism and treatment with Linezolid  · Wait to resume home Cymbalta 24 hours after completion of Linezolid    Urinary tract infection  Assessment & Plan  · See above    Bladder carcinoma ([facility] [address] )  Assessment & Plan  · 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, recurrence with CIS in 2016 BCG again  BCG refractory disease with pelvic metastases now s/p chemoradiation 2021, treatment on hold due to UTI  · With history of hematuria requiring PRBC transfusions, monitor  · Recent NM PET CT on 3/1 reviewed and discussed with patient   He is aware there is severe disease progression  · Patient has outpatient follow-up with [address] [place] [address] this upcoming Thursday, 3/9/2023  · Will provide palliative care referral on discharge after discussion with patient    Bilateral hydronephrosis  Assessment & Plan  · With history of bilateral hydronephrosis related to bladder cancer malignancy  · Chronic bilateral nephrostomy tubes in place  · Patient known to urology with instruction to maintain bilateral nephrostomy tubes by daily washing and cleansing  · CT abd/pelvis Jan 2023 reviewed "Bilateral percutaneous nephrostomy tubes in expected position as well as right double-J ureteral stent"  · Appreciate urology recommendations  · Recommend tx with antibiotics and f/u cultures, no further intervention needed at this time    Stage 4 chronic kidney disease [facility])  Assessment & Plan  Lab Results   Component Value Date    EGFR 24 03/04/2023    EGFR 23 03/03/2023    EGFR 19 01/24/2023    CREATININE 2 39 (H) 03/04/2023    CREATININE 2 50 (H) 03/03/2023    CREATININE 2 96 (H) 01/24/2023   · baseline Cr appears around 2 5-3, at baseline  · Continue to monitor  · I/O    Anemia  Assessment & Plan  · Baseline hemoglobin appears 7-8, likely secondary to CKD and hematuria from bladder malignancy  · Reviewed [facility] from January of 2023  Ideally hemoglobin should stay >8 given patients coronary artery disease, also discussed with patients grand daughter who is POA  · Hgb stable at 8 3 today, monitor   · Type and cross completed      CAD (coronary artery disease)  Assessment & Plan  · Continue Lopressor 25 mg every 12 hours (refills of this medication on discharge be provided as he only has 50 mg tablets), aspirin and Zetia  · Not on statin due to myalgias  · Without chest pain  · Followed by [facility]  He has a hx of CAD s/p stents (last PCI 10/2019)      Type 2 diabetes mellitus, with long-term current use of insulin [facility])  Assessment & Plan  Lab Results   Component Value Date    HGBA1C 6 7 (H) 03/03/2023       Recent Labs     03/04/23  0729 03/04/23  1118 03/04/23  1551 03/04/23  2119   POCGLU 109 119 139 154*     · Home regimen reviewed  Hold Metformin if applicable    · Continue 10 units lantus at night, hypoglycemic protocol, carbohydrate diet  · Patient with leg edema on Lispro, will adjust night time insulin for BS control 140-180 here    Blood Sugar Average: Last 72 hrs:  (P) 147 2    Moderate protein-calorie malnutrition (HCC)  Assessment & Plan  Malnutrition Findings:   Adult Malnutrition type: Chronic illness  Adult Degree of Malnutrition: Malnutrition of moderate degree  Malnutrition Characteristics: Inadequate energy, Weight loss, Fluid accumulation    360 Statement: PCM r/t cancer with chemoradiation AEB, a 6 6% unintended wt loss from 01/02/23 to present and a 12 8% unintended wt loss from 08/26/22 to present and < 75% energy intake compared to estimated energy needs for > 1 month  BMI Findings: Body mass index is 24 02 kg/m²  · Appreciate nutrition recommendations  · Encourage meals three times daily    Chronic pleural effusion  Assessment & Plan  · Patient with chronic left sided exudative pleural effusion secondary to malignancy  · Recent pulmonology visit 2/2023 left TPC place removed as it was not draining  · Patient on room air, without shortness of breath but with discomfort on left side worse when breathing in  · Will obtain chest x -ray    Cancer related pain  Assessment & Plan  · Resume oxycodone 5 mg prn, PDMP reviewed  · Continue with bowel regimen to avoid constipation, Miralax twice daily    VTE Pharmacologic Prophylaxis: VTE Score: 8 High Risk (Score >/= 5) - Pharmacological DVT Prophylaxis Ordered: heparin  Sequential Compression Devices Ordered  Patient Centered Rounds: I performed bedside rounds with nursing staff today  Discussions with Specialists or Other Care Team Provider: Infectious Disease, Urology    Education and Discussions with Family / Patient: Will update grand daughter in evening       Total Time Spent on Date of Encounter in care of patient: 35 minutes This time was spent on one or more of the following: performing physical exam; counseling and coordination of care; obtaining or reviewing history; documenting in the medical record; reviewing/ordering tests, medications or procedures; communicating with other healthcare professionals and discussing with patient's family/caregivers  Current Length of Stay: 2 day(s)  Current Patient Status: Inpatient   Certification Statement: The patient will continue to require additional inpatient hospital stay due to UTI treatment, infectious disease, pending cxr  Discharge Plan: pending ID recs, cxr    Code Status: Level 3 - DNAR and DNI    Subjective:   Patient seen and examined  Reports he is feeling overall well and still with some burning when he urinates  Denies any blood in his urine from last night or today that he knows of  He denies fever, chills, chest pain, shortness of breath or abdominal pain  Reports he is eating and drinking well but has not had a bowel movement in 3 days  Shares he has some discomfort in his left side since over a week ago when he got "fluid taken out"  Report some discomfort with breathing in but denies shortness of breath at rest or lying flat  He denies fevers, chills, nausea, vomiting, diarrhea, feeling agitated or having tremors  Denies any other signs of increased bleeding such as increased bruising or coughing up blood  Objective:     Vitals:   Temp (24hrs), Av 7 °F (36 5 °C), Min:97 5 °F (36 4 °C), Max:97 9 °F (36 6 °C)    Temp:  [97 5 °F (36 4 °C)-97 9 °F (36 6 °C)] 97 6 °F (36 4 °C)  HR:  [80-84] 80  Resp:  [16-18] 18  BP: (118-144)/(77-85) 118/79  Body mass index is 23 51 kg/m²  Input and Output Summary (last 24 hours): Intake/Output Summary (Last 24 hours) at 3/5/2023 0836  Last data filed at 3/5/2023 0501  Gross per 24 hour   Intake 120 ml   Output 1455 ml   Net -1335 ml       Physical Exam:   Physical Exam  Vitals and nursing note reviewed  Constitutional:       General: He is not in acute distress  Appearance: Normal appearance  He is well-developed  He is not ill-appearing  HENT:      Head: Normocephalic and atraumatic     Eyes:      Extraocular Movements: Extraocular movements intact  Conjunctiva/sclera: Conjunctivae normal       Pupils: Pupils are equal, round, and reactive to light  Cardiovascular:      Rate and Rhythm: Normal rate and regular rhythm  Heart sounds: Murmur (2/6 systolic murmur) heard  Pulmonary:      Effort: Pulmonary effort is normal  No respiratory distress  Breath sounds: No wheezing  Comments: Decreased breath sounds left lung base  Abdominal:      General: Bowel sounds are normal       Palpations: Abdomen is soft  Tenderness: There is no abdominal tenderness  Genitourinary:     Comments: B/l nephrostomy tubes in place, draining adequately  Musculoskeletal:      Right lower leg: No edema  Left lower leg: No edema  Skin:     General: Skin is warm and dry  Capillary Refill: Capillary refill takes less than 2 seconds  Findings: No bruising  Neurological:      Mental Status: He is alert  Psychiatric:         Mood and Affect: Mood normal          Behavior: Behavior normal           Additional Data:     Labs:  Results from last 7 days   Lab Units 03/05/23  0620 03/04/23  0617 03/03/23  1534   WBC Thousand/uL 15 56*   < > 14 54*   HEMOGLOBIN g/dL 8 3*   < > 9 4*   HEMATOCRIT % 26 1*   < > 29 4*   PLATELETS Thousands/uL 247   < > 252   NEUTROS PCT %  --   --  77*   LYMPHS PCT %  --   --  12*   MONOS PCT %  --   --  5   EOS PCT %  --   --  5    < > = values in this interval not displayed       Results from last 7 days   Lab Units 03/05/23  0620   SODIUM mmol/L 133*   POTASSIUM mmol/L 4 7   CHLORIDE mmol/L 101   CO2 mmol/L 25   BUN mg/dL 47*   CREATININE mg/dL 2 40*   ANION GAP mmol/L 7   CALCIUM mg/dL 10 3*   GLUCOSE RANDOM mg/dL 120         Results from last 7 days   Lab Units 03/05/23  0759 03/04/23  2119 03/04/23  1551 03/04/23  1118 03/04/23  0729 03/03/23  2102 03/01/23  1253   POC GLUCOSE mg/dl 99 154* 139 119 109 215* 154*     Results from last 7 days   Lab Units 03/03/23  1534   HEMOGLOBIN A1C % 6 7*     Results from last 7 days   Lab Units 03/03/23  1534   LACTIC ACID mmol/L 1 1       Lines/Drains:  Invasive Devices     Peripheral Intravenous Line  Duration           Peripheral IV 03/04/23 Right Antecubital 1 day          Drain  Duration           Urethral Catheter Three way 20 Fr  55 days    Nephrostomy Left 10 2 Fr  53 days    Nephrostomy Right 10 2 Fr  53 days                Imaging: Pending CXR    Recent Cultures (last 7 days):   Results from last 7 days   Lab Units 03/03/23  1702 03/03/23  1646 03/03/23  1534   BLOOD CULTURE   --  No Growth at 24 hrs  No Growth at 24 hrs     URINE CULTURE  >100,000 cfu/ml  --   --        Last 24 Hours Medication List:   Current Facility-Administered Medications   Medication Dose Route Frequency Provider Last Rate   • acetaminophen  975 mg Oral Q8H PRN Pérez Ruiz PA-C     • aluminum-magnesium hydroxide-simethicone  30 mL Oral Q6H PRN Deanna Reed, DO     • ARIPiprazole  2 mg Oral Daily [facility], DO     • aspirin  81 mg Oral Daily [facility], DO     • bimatoprost  1 drop Both Eyes HS Piyush Darshana Stallion, DO     • docusate sodium  100 mg Oral BID Deanna Reed, DO     • ezetimibe  10 mg Oral Daily [facility], DO     • gabapentin  300 mg Oral HS [facility], DO     • heparin (porcine)  5,000 Units Subcutaneous [facility], DO     • insulin glargine  10 Units Subcutaneous HS Deanna Brianna, DO     • linezolid  600 mg Oral Q12H Piyush Darshana Stallion, DO     • metoprolol tartrate  25 mg Oral Q12H [address] [facility], DO     • ondansetron  4 mg Intravenous Q6H PRN Deanna Reed, DO     • oxybutynin  5 mg Oral TID Deanna Reed, DO     • oxyCODONE  5 mg Oral Q4H PRN Deanna Reed, DO     • pantoprazole  40 mg Oral Daily Before Breakfast [facility] Eric, DO     • polyethylene glycol  17 g Oral BID Nannette Castillo PA-C     • simethicone  80 mg Oral 4x Daily PRN [facility] Eric, DO          Today, Patient Was Seen By: Jagdish Farias Amrita Mendoza PA-C    **Please Note: This note may have been constructed using a voice recognition system  **

## 2023-03-05 NOTE — OCCUPATIONAL THERAPY NOTE
Occupational Therapy Evaluation     Patient Name: Zay Castro  RXCSF'X Date: 3/5/2023  Problem List  Principal Problem:    Sepsis ([facility] )  Active Problems:    Bladder carcinoma ([facility] )    Type 2 diabetes mellitus, with long-term current use of insulin ([facility])    Anemia    Stage 4 chronic kidney disease ([facility])    Bilateral hydronephrosis    Cancer related pain    Chronic pleural effusion    Urinary tract infection    CAD (coronary artery disease)    Moderate protein-calorie malnutrition ([facility] )    Past Medical History  Past Medical History:   Diagnosis Date    Anemia     Last assessed: 9/28/17    Anxiety     Arteriosclerotic cardiovascular disease     Last assessed: 9/28/17    Arthritis     Bladder cancer ([facility] )     bladder- had BCG treatments    Chronic kidney disease     Stage IV    CKD (chronic kidney disease) stage 4, GFR 15-29 ml/min ([facility])     Colon polyp     Coronary artery disease     7 stents    Depression     Diabetes mellitus ([facility])     IDDM    GERD (gastroesophageal reflux disease)     Glaucoma     Hematuria     History of fusion of cervical spine     Hyperlipidemia     Hypertension     Insomnia     Last assessed: 11/14/12    Loss of hearing     has hearing aids but usually does not wear them    Lung cancer ([facility] )     Metastatic cancer ([facility] )     Other seasonal allergic rhinitis     Last assessed: 2/10/16    PAD (peripheral artery disease) ([facility])     Shortness of breath     on exertion    Spinal stenosis of lumbar region     Transient cerebral ischemia     No Residual    Uses walker     w/c for longer distances     Past Surgical History  Past Surgical History:   Procedure Laterality Date    CARDIAC SURGERY      Cath stent placement  Last assessed: 3/9/17  Interventional Catheterization    CHOLECYSTECTOMY      COLONOSCOPY      CYSTOSCOPY      Diagnostic w/biopsy  Zabrina Javier  Last assessed: 12/1/14    CYSTOSCOPY N/A 04/12/2022    Procedure: CYSTOSCOPY  Bladder biopsies  ;  Surgeon: Ritesh Stephenson MD; [facility];  Service: Urology    CYSTOSCOPY W/ RETROGRADES Right 03/01/2022    Procedure: CYSTO; stent removal retrograde;  Surgeon: Fatuma Hernandez MD;  [facility];  Service: Urology    CYSTOSCOPY W/ URETERAL STENT PLACEMENT Bilateral 10/18/2021    Procedure: bilateral retrogrades, cytology collection;  Surgeon: Fatuma Hernandez MD;  [facility];  Service: Urology    CYSTOURETHROSCOPY      w/cautery  Vera Pepe    FL RETROGRADE PYELOGRAM  10/18/2021    FL RETROGRADE PYELOGRAM  10/24/2021    FL RETROGRADE PYELOGRAM  12/14/2022    GASTRIC BYPASS      For morbid obesity w/Shaji-en-Y   Resolved: 11/17/09    INCISION AND DRAINAGE OF WOUND Right 02/26/2017    Procedure: INCISION AND DRAINAGE (I&D) EXTREMITY WITH APPLICATION OF GRAFT JACKET;  Surgeon: Nuzhat Pavon DPM;  [facility];  Service:     INCISION AND DRAINAGE OF WOUND Right 04/25/2017    Procedure: INCISION AND DRAINAGE (I&D) EXTREMITY, APPLICATION OF GRAFT;  Surgeon: Nuzhat Pavon DPM;  [facility];  Service:     IR BIOPSY OTHER  07/02/2020    IR LOWER EXTREMITY ANGIOGRAM  02/08/2021    IR LOWER EXTREMITY ANGIOGRAM  02/11/2021    IR NEPHROSTOMY TUBE CHECK/CHANGE/REPOSITION/REINSERTION/UPSIZE  04/28/2022    IR NEPHROSTOMY TUBE CHECK/CHANGE/REPOSITION/REINSERTION/UPSIZE  05/24/2022    IR NEPHROSTOMY TUBE CHECK/CHANGE/REPOSITION/REINSERTION/UPSIZE  06/07/2022    IR NEPHROSTOMY TUBE CHECK/CHANGE/REPOSITION/REINSERTION/UPSIZE  07/28/2022    IR NEPHROSTOMY TUBE CHECK/CHANGE/REPOSITION/REINSERTION/UPSIZE  11/15/2022    IR NEPHROSTOMY TUBE CHECK/CHANGE/REPOSITION/REINSERTION/UPSIZE  1/10/2023    IR NEPHROSTOMY TUBE PLACEMENT  02/25/2022    IR PORT PLACEMENT  01/17/2022    IR PORT REMOVAL  1/10/2023    IR THORACENTESIS  09/02/2022    IR THORACENTESIS  09/14/2022    IR THORACENTESIS WITH TUBE PLACEMENT Left     october    IR TUNNELED CENTRAL LINE PLACEMENT  12/24/2020    JOINT REPLACEMENT      christofer knees replaced SD AMPUTATION METATARSAL W/TOE SINGLE Left 12/21/2020    Procedure: RAY RESECTION FOOT;  Surgeon: Isabel Ramires DPM;  [facility];  Service: Podiatry    SD AMPUTATION METATARSAL W/TOE SINGLE Left 12/31/2020    Procedure: 5TH MET RESECTION;  Surgeon: Isabel Ramires DPM;  [facility];  Service: Podiatry    SD CYSTO BLADDER W/URETERAL CATHETERIZATION Right 12/14/2022    Procedure: CYSTOSCOPY WITH RETROGRADE PYELOGRAM and CLOT EVACUATION, RIGHT STENT INSERTION, FULGRATION OF BLEEDING POINTS;  Surgeon: Sanya Oviedo MD;  [facility];  Service: Urology    SD CYSTO W/IRRIG & EVAC MULTPLE OBSTRUCTING CLOTS N/A 02/10/2021    Procedure: CYSTOSCOPY EVACUATION OF CLOTS, fulguration;  Surgeon: Rj Mccoy MD;  [facility];  Service: Urology    SD CYSTO W/IRRIG & EVAC MULTPLE OBSTRUCTING CLOTS N/A 10/24/2021    Procedure: CYSTOSCOPY EVACUATION OF CLOT, fulguration of bleeding vessels, right ureter stent placement, retrograde pyelogram;  Surgeon: Trip Scott MD;  [facility];  Service: Urology    SD CYSTO W/REMOVAL OF LESIONS SMALL N/A 11/19/2020    Procedure: CYSTO W/BIOPSIES, transurethral prostate bx;  Surgeon: Mahin Dimas MD;  [facility];  Service: Urology    SD CYSTOURETHROSCOPY W/DEST &/RMVL TUMOR LARGE Bilateral 10/18/2021    Procedure: TRANSURETHRAL RESECTION OF BLADDER TUMOR (TURBT);   Surgeon: Temi Mendoza MD;  [facility];  Service: Urology    SD CYSTOURETHROSCOPY WITH BIOPSY N/A 08/16/2016    Procedure: Mumtaz Estimable;  Surgeon: Jess Garcia MD;  [facility];  Service: Urology    SD [place] 3RD+ ORD [address] PEL/LXTR [facility] Left 02/08/2021    Procedure: LEG angiogram, CO2 w/limited contrast with balloon angioplasty postertior tibial artery;  Surgeon: Brian Garcia MD;  [facility];  Service: Vascular    ROTATOR [facility]      Surgery Shaji-en-Y    SPINAL FUSION lumbar and cervical fusions    VAC DRESSING APPLICATION Right 96/12/6105    Procedure: APPLICATION VAC DRESSING;  Surgeon: Ananda Garcia DPM;  [facility];  Service:     WOUND DEBRIDEMENT Left 02/16/2021    Procedure: FOOT DEBRIDE, [address] OUT w/graft application;  Surgeon: Ananda Garcia DPM;  [facility];  Service: Podiatry        03/05/23 1058   OT Last Visit   OT Visit Date 03/05/23   Note Type   Note type Evaluation   Additional Comments Greeted in supine and agreeable to skilled OT evaluation  Pain Assessment   Pain Assessment Tool FLACC   Pain Rating: FLACC (Rest) - Face 0   Pain Rating: FLACC (Rest) - Legs 0   Pain Rating: FLACC (Rest) - Activity 0   Pain Rating: FLACC (Rest) - Cry 0   Pain Rating: FLACC (Rest) - Consolability 0   Score: FLACC (Rest) 0   Pain Rating: FLACC (Activity) - Face 0   Pain Rating: FLACC (Activity) - Legs 0   Pain Rating: FLACC (Activity) - Activity 0   Pain Rating: FLACC (Activity) - Cry 0   Pain Rating: FLACC (Activity) - Consolability 0   Score: FLACC (Activity) 0   Restrictions/Precautions   Weight Bearing Precautions Per Order No   Other Precautions Bed Alarm;Multiple lines; Fall Risk  (bilateral nephrostomy tubes)   Home Living   Type of [address] [place] [address] One level;Stairs to enter with rails; Performs ADLs on one level   Bathroom Shower/Tub Tub/shower unit   Bathroom Toilet Standard   Home Equipment Walker;Cane;Wheelchair-manual   Prior Function   Level of [place] Needs assistance with ADLs; Needs assistance with functional mobility; Needs assistance with IADLS   Lives With Spouse  (sister in law)   Marta Maya Help From Family  (son, wife and sister)   IADLs Family/Friend/Other provides transportation; Family/Friend/Other provides meals; Family/Friend/Other provides medication management   Falls in the last 6 months 5 to 10   Vocational Retired   Comments Prior to admission, pt lives with wife and sister in law in a single level home with 8 ADILENE front and ) ADILENE back  Pt has A for ADLS, transfers/ mobility and IADLS  Ambulates short distances with RW  Reports he just got a WC  Wife assists with all ADLS and transfers  Has a tub shower but sponge bathes  Standard and raised toilets in the home  Does not drive  Reports 5 falls in the past 6 months  Reports was receiving HHA OT/ PT and HHA was set to start a couple days a week  Lifestyle   Reciprocal Relationships Family   ADL   Where Assessed Edge of bed   Eating Assistance 7  Independent   Grooming Assistance 6  Modified Independent   UB Bathing Assistance 5  Supervision/Setup   LB Bathing Assistance 4  Minimal Assistance   UB Dressing Assistance 5  Supervision/Setup   LB Dressing Assistance 3  Moderate Assistance   Toileting Assistance  3  Moderate Assistance   Bed Mobility   Supine to Sit 5  Supervision   Additional items HOB elevated; Bedrails; Increased time required;Verbal cues   Sit to Supine 5  Supervision   Additional items HOB elevated; Bedrails; Increased time required;Verbal cues   Transfers   Sit to Stand 3  Moderate assistance   Additional items Assist x 1;Bedrails; Increased time required;Verbal cues   Stand to Sit 3  Moderate assistance   Additional items Assist x 1;Bedrails; Increased time required;Verbal cues   Functional Mobility   Functional Mobility 3  Moderate assistance   Additional Comments assistx 2, RW, lateral side stepping     Additional items Rolling walker   Balance   Static Sitting Fair +   Dynamic Sitting Fair   Static Standing Poor +   Dynamic Standing Poor   Ambulatory Poor   Activity Tolerance   Activity Tolerance Patient limited by fatigue   Medical Staff Made Aware RN Ambrocio Esteban, PT Guerline Ramires Assessment   RUE Assessment WFL   LUE Assessment   LUE Assessment WFL   Hand Function   Gross Motor Coordination Functional   Fine Motor Coordination Functional   Psychosocial   Psychosocial (WDL) WDL   Cognition   Overall Cognitive Status WFL   Arousal/Participation Cooperative   Attention Within functional limits   Orientation Level Oriented X4   Memory Within functional limits   Following Commands Follows one step commands without difficulty   Assessment   Limitation Decreased ADL status; Decreased UE strength;Decreased Safe judgement during ADL;Decreased endurance;Decreased self-care trans   Prognosis Good   Assessment Nancy Yi is a [de-identified] y o  male who presents with left flank pain  Pt admitted for medical management of sepsis due to UTI  Pt with active orders for OT  Pt with PMH significant for bladder carcinoma  DM T2, CKD, bilateral hydronephrosis, CAD  Prior to admission, pt lives with wife and sister in law in a single level home with 8 ADILENE front and ) ADILENE back  Pt has A for ADLS, transfers/ mobility and IADLS  Ambulates short distances with RW  Reports he just got a WC  Wife assists with all ADLS and transfers  Has a tub shower but sponge bathes  Standard and raised toilets in the home  Does not drive  Reports 5 falls in the past 6 months  Reports was receiving HHA OT/ PT and HHA was set to start a couple days a week  Upon evaluation, pt presenting below functional baseline with deficits noted in strength, activity tolerance, balance, safety awareness, which is limiting pts functional ability to complete ADLs/ IADLs, functional transfers and functional mobility  Pt current level of function: bed mobility - supervision; transfers- modAx1; functional mobility-modAx1 RW lateral side stepping; UB dressing / bathing - supervision; LB dressing/ bathing- modA; toileting - Bernardo Locke  Pt would benefit from skilled OT 3-5x/wk to maximize functional independence  OT DC recommendation: STR v Home  Goals   Patient Goals to go home     STG Time Frame 3-5   Short Term Goal #1 Pt will improve activity tolerance to G for min 30 min txment sessions for increase engagement in functional tasks   Short Term Goal #2 Pt will complete bed mobility at a Mod I level w/ G balance/safety demonstrated to decrease caregiver assistance required   Short Term Goal  Pt will complete LB dressing/self care w/ Hunter using adaptive device and DME as needed   LTG Time Frame 10-14   Long Term Goal #1 Pt will complete toileting w/ [place] w/ G hygiene/thoroughness using DME as needed   Long Term Goal #2 Pt will improve functional transfers to [facility] on/off all surfaces using DME as needed w/ G balance/safety   Long Term Goal Pt will demonstrate G carryover of pt/caregiver education and training as appropriate w/o cues w/ good tolerance to increase safety during functional tasks   Plan   Treatment Interventions ADL retraining;Functional transfer training;UE strengthening/ROM; Endurance training;Patient/family training;Equipment evaluation/education; Neuromuscular reeducation; Compensatory technique education; Energy conservation; Activityengagement   Goal Expiration Date 03/19/23   OT Treatment Day 0   OT Frequency 3-5x/wk   Recommendation   OT Discharge Recommendation Post acute rehabilitation services  (vs home with home health services pending progress and family support)   Additional Comments  The patient's raw score on the AM-PAC Daily Activity Inpatient Short Form is 16  A raw score of less than 19 suggests the patient may benefit from discharge to post-acute rehabilitation services  Please refer to the recommendation of the Occupational Therapist for safe discharge planning     AM-PAC Daily Activity Inpatient   Lower Body Dressing 2   Bathing 2   Toileting 2   Upper Body Dressing 3   Grooming 3   Eating 4   Daily Activity Raw Score 16   Daily Activity Standardized Score (Calc for Raw Score >=11) 35 96   AM-[facility] Applied Cognition Inpatient   Following a Speech/Presentation 4   Understanding Ordinary Conversation 4   Taking Medications 4   Remembering Where Things Are Placed or Put Away 4   Remembering List of 4-5 Errands 4   Taking Care of Complicated Tasks 4   Applied Cognition Raw Score 24   Applied Cognition Standardized Score 62 21 Keenan Rowe, OT

## 2023-03-05 NOTE — QUICK NOTE
Discussed patient's case with infectious disease  Continue PO linezolid 600 mg twice daily for a 7 day course  Concern for cytopenia with more prolonged courses  Chest X-ray from today also reviewed which displays stable small left pleural effusion and no pneumothorax  Will plan for discharge in morning if deemed clinically appropriate  Discussed with granddaughter  Angelica who is POA  She also shares patient is already receiving home care through a different agency and PT/OT not warranted at this time  If patient is to stay, re consult PT/OT for continuous rehab while here  All questions answered  Add on:   Nursing with reports that patient had to be lowered to the ground walking back from the bathroom as his knees gave out  Discussed with nursing and wife bedside  Fall was witnessed with no head strike  Patient was lowered onto his knees and offers no complaints of pain at this time  Adequate ROM to b/l lower extremities without pain to palpation noted

## 2023-03-05 NOTE — PLAN OF CARE
Problem: OCCUPATIONAL THERAPY ADULT  Goal: Performs self-care activities at highest level of function for planned discharge setting  See evaluation for individualized goals  Description: Treatment Interventions: ADL retraining, Functional transfer training, UE strengthening/ROM, Endurance training, Patient/family training, Equipment evaluation/education, Neuromuscular reeducation, Compensatory technique education, Energy conservation, Activityengagement          See flowsheet documentation for full assessment, interventions and recommendations  Note: Limitation: Decreased ADL status, Decreased UE strength, Decreased Safe judgement during ADL, Decreased endurance, Decreased self-care trans  Prognosis: Good  Assessment: Meghan Herrera is a [de-identified] y o  male who presents with left flank pain  Pt admitted for medical management of sepsis due to UTI  Pt with active orders for OT  Pt with PMH significant for bladder carcinoma  DM T2, CKD, bilateral hydronephrosis, CAD  Prior to admission, pt lives with wife and sister in law in a single level home with 8 ADILENE front and ) ADILENE back  Pt has A for ADLS, transfers/ mobility and IADLS  Ambulates short distances with RW  Reports he just got a WC  Wife assists with all ADLS and transfers  Has a tub shower but sponge bathes  Standard and raised toilets in the home  Does not drive  Reports 5 falls in the past 6 months  Reports was receiving HHA OT/ PT and HHA was set to start a couple days a week  Upon evaluation, pt presenting below functional baseline with deficits noted in strength, activity tolerance, balance, safety awareness, which is limiting pts functional ability to complete ADLs/ IADLs, functional transfers and functional mobility  Pt current level of function: bed mobility - supervision; transfers- modAx1; functional mobility-modAx1 RW lateral side stepping; UB dressing / bathing - supervision; LB dressing/ bathing- modA; toileting - Pamela Melendez    Pt would benefit from skilled OT 3-5x/wk to maximize functional independence  OT DC recommendation: STR v Home       OT Discharge Recommendation: Post acute rehabilitation services (vs home with home health services pending progress and family support)

## 2023-03-05 NOTE — PHYSICAL THERAPY NOTE
For Information Only:    Pt was seen by inpt PT and PT IE was completed on 11:58  Following completing of PT IE documentation, reviewed pts orders and saw that PT was discontinued after 1300 by ARPITA Ho  Markesan text to [place] reporting that pt was seen for OT and PT evaluations late morning  Per [place], pt is planning to be DC in the near future with palliative care consult  [place] reports will place another inpt therapy services order as needed prior to DC

## 2023-03-05 NOTE — ASSESSMENT & PLAN NOTE
· Patient with chronic left sided exudative pleural effusion secondary to malignancy  · Recent pulmonology visit 2/2023 left TPC place removed as it was not draining  · Patient on room air, without shortness of breath but with discomfort on left side worse when breathing in  · Will obtain chest x -ray

## 2023-03-05 NOTE — PLAN OF CARE
Problem: Potential for Falls  Goal: Patient will remain free of falls  Description: INTERVENTIONS:  - Educate patient/family on patient safety including physical limitations  - Instruct patient to call for assistance with activity   - Consult OT/PT to assist with strengthening/mobility   - Keep Call bell within reach  - Keep bed low and locked with side rails adjusted as appropriate  - Keep care items and personal belongings within reach  - Initiate and maintain comfort rounds  - Make Fall Risk Sign visible to staff  - Offer Toileting   - Initiate/Maintain   - Obtain necessary fall risk management equipment:   - Apply yellow socks and bracelet for high fall risk patients  - Consider moving patient to room near nurses station  Outcome: Progressing     Problem: Nutrition/Hydration-ADULT  Goal: Nutrient/Hydration intake appropriate for improving, restoring or maintaining nutritional needs  Description: Monitor and assess patient's nutrition/hydration status for malnutrition  Collaborate with interdisciplinary team and initiate plan and interventions as ordered  Monitor patient's weight and dietary intake as ordered or per policy  Utilize nutrition screening tool and intervene as necessary  Determine patient's food preferences and provide high-protein, high-caloric foods as appropriate       INTERVENTIONS:  - Monitor oral intake, urinary output, labs, and treatment plans  - Assess nutrition and hydration status and recommend course of action  - Evaluate amount of meals eaten  - Assist patient with eating if necessary   - Allow adequate time for meals  - Recommend/ encourage appropriate diets, oral nutritional supplements, and vitamin/mineral supplements  - Order, calculate, and assess calorie counts as needed  - Recommend, monitor, and adjust tube feedings and TPN/PPN based on assessed needs  - Assess need for intravenous fluids  - Provide specific nutrition/hydration education as appropriate  - Include patient/family/caregiver in decisions related to nutrition  Outcome: Progressing     Problem: MOBILITY - ADULT  Goal: Maintain or return to baseline ADL function  Description: INTERVENTIONS:  -  Assess patient's ability to carry out ADLs; assess patient's baseline for ADL function and identify physical deficits which impact ability to perform ADLs (bathing, care of mouth/teeth, toileting, grooming, dressing, etc )  - Assess/evaluate cause of self-care deficits   - Assess range of motion  - Assess patient's mobility; develop plan if impaired  - Assess patient's need for assistive devices and provide as appropriate  - Encourage maximum independence but intervene and supervise when necessary  - Involve family in performance of ADLs  - Assess for home care needs following discharge   - Consider OT consult to assist with ADL evaluation and planning for discharge  - Provide patient education as appropriate  Outcome: Progressing  Goal: Maintains/Returns to pre admission functional level  Description: INTERVENTIONS:  - Perform BMAT or MOVE assessment daily    - Set and communicate daily mobility goal to care team and patient/family/caregiver     - Collaborate with rehabilitation services on mobility goals if consulted  - Perform Range of Motion   - Reposition patient   - Dangle patient   - Stand patient   - Ambulate patient   - Out of bed to chair   - Out of bed for meals   - Out of bed for toileting  - Record patient progress and toleration of activity level   Outcome: Progressing     Problem: Prexisting or High Potential for Compromised Skin Integrity  Goal: Skin integrity is maintained or improved  Description: INTERVENTIONS:  - Identify patients at risk for skin breakdown  - Assess and monitor skin integrity  - Assess and monitor nutrition and hydration status  - Monitor labs   - Assess for incontinence   - Turn and reposition patient  - Assist with mobility/ambulation  - Relieve pressure over bony prominences  - Avoid friction and shearing  - Provide appropriate hygiene as needed including keeping skin clean and dry  - Evaluate need for skin moisturizer/barrier cream  - Collaborate with interdisciplinary team   - Patient/family teaching  - Consider wound care consult   Outcome: Progressing     Problem: GENITOURINARY - ADULT  Goal: Maintains or returns to baseline urinary function  Description: INTERVENTIONS:  - Assess urinary function  - Encourage oral fluids to ensure adequate hydration if ordered  - Administer IV fluids as ordered to ensure adequate hydration  - Administer ordered medications as needed  - Offer frequent toileting  - Follow urinary retention protocol if ordered  Outcome: Progressing  Goal: Absence of urinary retention  Description: INTERVENTIONS:  - Assess patient’s ability to void and empty bladder  - Monitor I/O  - Bladder scan as needed  - Discuss with physician/AP medications to alleviate retention as needed  - Discuss catheterization for long term situations as appropriate  Outcome: Progressing  Goal: Urinary catheter remains patent  Description: INTERVENTIONS:  - Assess patency of urinary catheter  - If patient has a chronic wagner, consider changing catheter if non-functioning  - Follow guidelines for intermittent irrigation of non-functioning urinary catheter  Outcome: Progressing     Problem: METABOLIC, FLUID AND ELECTROLYTES - ADULT  Goal: Electrolytes maintained within normal limits  Description: INTERVENTIONS:  - Monitor labs and assess patient for signs and symptoms of electrolyte imbalances  - Administer electrolyte replacement as ordered  - Monitor response to electrolyte replacements, including repeat lab results as appropriate  - Instruct patient on fluid and nutrition as appropriate  Outcome: Progressing  Goal: Fluid balance maintained  Description: INTERVENTIONS:  - Monitor labs   - Monitor I/O and WT  - Instruct patient on fluid and nutrition as appropriate  - Assess for signs & symptoms of volume excess or deficit  Outcome: Progressing  Goal: Glucose maintained within target range  Description: INTERVENTIONS:  - Monitor Blood Glucose as ordered  - Assess for signs and symptoms of hyperglycemia and hypoglycemia  - Administer ordered medications to maintain glucose within target range  - Assess nutritional intake and initiate nutrition service referral as needed  Outcome: Progressing     Problem: SKIN/TISSUE INTEGRITY - ADULT  Goal: Skin Integrity remains intact(Skin Breakdown Prevention)  Description: Assess:  -Perform Ortega assessment   -Clean and moisturize skin   -Inspect skin when repositioning, toileting, and assisting with ADLS  -Assess under medical devices     Bed Management:  -Have minimal linens on bed & keep smooth, unwrinkled  -Change linens as needed when moist or perspiring  -Avoid sitting or lying in one position       Toileting:  -Offer bedside commode  -Assess for incontinence   -Use incontinent care products after each incontinent episode     Activity:  -Mobilize patient  -Encourage activity and walks on unit  -Encourage or provide ROM exercises   -Turn and reposition patient   -Use appropriate equipment to lift or move patient in bed  -Instruct/ Assist with weight shifting   -Consider limitation of chair time     Skin Care:  -Avoid use of baby powder, tape, friction and shearing, hot water or constrictive clothing  -Relieve pressure over bony prominences   -Do not massage red bony areas    Next Steps:  -Teach patient strategies to minimize risks   -Consider consults to  interdisciplinary teams   Outcome: Progressing  Goal: Pressure injury heals and does not worsen  Description: Interventions:  - Implement low air loss mattress or specialty surface (Criteria met)  - Apply silicone foam dressing  - Instruct/assist with weight shifting   - Limit chair time  - Use special pressure reducing interventions   - Apply fecal or urinary incontinence containment device   - Perform passive or active ROM   - Turn and reposition patient & offload bony prominences   - Utilize friction reducing device or surface for transfers   - Consider consults to  interdisciplinary teams   - Use incontinent care products after each incontinent episode s  - Consider nutrition services referral as needed  Outcome: Progressing     Problem: PAIN - ADULT  Goal: Verbalizes/displays adequate comfort level or baseline comfort level  Description: Interventions:  - Encourage patient to monitor pain and request assistance  - Assess pain using appropriate pain scale  - Administer analgesics based on type and severity of pain and evaluate response  - Implement non-pharmacological measures as appropriate and evaluate response  - Consider cultural and social influences on pain and pain management  - Notify physician/advanced practitioner if interventions unsuccessful or patient reports new pain  Outcome: Progressing     Problem: INFECTION - ADULT  Goal: Absence or prevention of progression during hospitalization  Description: INTERVENTIONS:  - Assess and monitor for signs and symptoms of infection  - Monitor lab/diagnostic results  - Monitor all insertion sites, i e  indwelling lines, tubes, and drains  - Monitor endotracheal if appropriate and nasal secretions for changes in amount and color  - [place] appropriate cooling/warming therapies per order  - Administer medications as ordered  - Instruct and encourage patient and family to use good hand hygiene technique  - Identify and instruct in appropriate isolation precautions for identified infection/condition  Outcome: Progressing  Goal: Absence of fever/infection during neutropenic period  Description: INTERVENTIONS:  - Monitor WBC    Outcome: Progressing     Problem: SAFETY ADULT  Goal: Patient will remain free of falls  Description: INTERVENTIONS:  - Educate patient/family on patient safety including physical limitations  - Instruct patient to call for assistance with activity - Consult OT/PT to assist with strengthening/mobility   - Keep Call bell within reach  - Keep bed low and locked with side rails adjusted as appropriate  - Keep care items and personal belongings within reach  - Initiate and maintain comfort rounds  - Make Fall Risk Sign visible to staff  - Offer Toileting   - Initiate/Maintain   - Obtain necessary fall risk management equipment:   - Apply yellow socks and bracelet for high fall risk patients  - Consider moving patient to room near nurses station  Outcome: Progressing  Goal: Maintain or return to baseline ADL function  Description: INTERVENTIONS:  -  Assess patient's ability to carry out ADLs; assess patient's baseline for ADL function and identify physical deficits which impact ability to perform ADLs (bathing, care of mouth/teeth, toileting, grooming, dressing, etc )  - Assess/evaluate cause of self-care deficits   - Assess range of motion  - Assess patient's mobility; develop plan if impaired  - Assess patient's need for assistive devices and provide as appropriate  - Encourage maximum independence but intervene and supervise when necessary  - Involve family in performance of ADLs  - Assess for home care needs following discharge   - Consider OT consult to assist with ADL evaluation and planning for discharge  - Provide patient education as appropriate  Outcome: Progressing  Goal: Maintains/Returns to pre admission functional level  Description: INTERVENTIONS:  - Perform BMAT or MOVE assessment daily    - Set and communicate daily mobility goal to care team and patient/family/caregiver     - Collaborate with rehabilitation services on mobility goals if consulted  - Perform Range of Motion    - Reposition patient   - Dangle patient   - Stand patient   - Ambulate patient   - Out of bed to chair    - Out of bed for meals   - Out of bed for toileting  - Record patient progress and toleration of activity level   Outcome: Progressing

## 2023-03-06 ENCOUNTER — TELEPHONE (OUTPATIENT)
Dept: HEMATOLOGY ONCOLOGY | Facility: CLINIC | Age: 78
End: 2023-03-06

## 2023-03-06 PROBLEM — E87.1 HYPONATREMIA: Status: ACTIVE | Noted: 2023-03-06

## 2023-03-06 LAB
ANION GAP SERPL CALCULATED.3IONS-SCNC: 8 MMOL/L (ref 4–13)
BUN SERPL-MCNC: 46 MG/DL (ref 5–25)
CALCIUM SERPL-MCNC: 10 MG/DL (ref 8.4–10.2)
CHLORIDE SERPL-SCNC: 102 MMOL/L (ref 96–108)
CO2 SERPL-SCNC: 22 MMOL/L (ref 21–32)
CREAT SERPL-MCNC: 2.37 MG/DL (ref 0.6–1.3)
ERYTHROCYTE [DISTWIDTH] IN BLOOD BY AUTOMATED COUNT: 16 % (ref 11.6–15.1)
GFR SERPL CREATININE-BSD FRML MDRD: 24 ML/MIN/1.73SQ M
GLUCOSE SERPL-MCNC: 118 MG/DL (ref 65–140)
GLUCOSE SERPL-MCNC: 131 MG/DL (ref 65–140)
GLUCOSE SERPL-MCNC: 71 MG/DL (ref 65–140)
GLUCOSE SERPL-MCNC: 71 MG/DL (ref 65–140)
GLUCOSE SERPL-MCNC: 97 MG/DL (ref 65–140)
HCT VFR BLD AUTO: 26.1 % (ref 36.5–49.3)
HGB BLD-MCNC: 8.4 G/DL (ref 12–17)
MCH RBC QN AUTO: 29.3 PG (ref 26.8–34.3)
MCHC RBC AUTO-ENTMCNC: 32.2 G/DL (ref 31.4–37.4)
MCV RBC AUTO: 91 FL (ref 82–98)
PLATELET # BLD AUTO: 237 THOUSANDS/UL (ref 149–390)
PMV BLD AUTO: 9.4 FL (ref 8.9–12.7)
POTASSIUM SERPL-SCNC: 5.1 MMOL/L (ref 3.5–5.3)
RBC # BLD AUTO: 2.87 MILLION/UL (ref 3.88–5.62)
SODIUM SERPL-SCNC: 132 MMOL/L (ref 135–147)
SODIUM SERPL-SCNC: 132 MMOL/L (ref 135–147)
WBC # BLD AUTO: 15.69 THOUSAND/UL (ref 4.31–10.16)

## 2023-03-06 RX ORDER — LINEZOLID 600 MG/1
600 TABLET, FILM COATED ORAL EVERY 12 HOURS
Qty: 10 TABLET | Refills: 0 | Status: SHIPPED | OUTPATIENT
Start: 2023-03-06 | End: 2023-03-11

## 2023-03-06 RX ORDER — SODIUM CHLORIDE 9 MG/ML
100 INJECTION, SOLUTION INTRAVENOUS CONTINUOUS
Status: DISCONTINUED | OUTPATIENT
Start: 2023-03-06 | End: 2023-03-07

## 2023-03-06 RX ADMIN — ASPIRIN 81 MG: 81 TABLET, COATED ORAL at 08:46

## 2023-03-06 RX ADMIN — METOPROLOL TARTRATE 25 MG: 25 TABLET, FILM COATED ORAL at 08:46

## 2023-03-06 RX ADMIN — GABAPENTIN 300 MG: 300 CAPSULE ORAL at 23:05

## 2023-03-06 RX ADMIN — OXYCODONE 5 MG: 5 TABLET ORAL at 23:07

## 2023-03-06 RX ADMIN — INSULIN GLARGINE 10 UNITS: 100 INJECTION, SOLUTION SUBCUTANEOUS at 23:04

## 2023-03-06 RX ADMIN — DOCUSATE SODIUM 100 MG: 100 CAPSULE, LIQUID FILLED ORAL at 08:46

## 2023-03-06 RX ADMIN — METOPROLOL TARTRATE 25 MG: 25 TABLET, FILM COATED ORAL at 20:31

## 2023-03-06 RX ADMIN — PANTOPRAZOLE SODIUM 40 MG: 40 TABLET, DELAYED RELEASE ORAL at 05:27

## 2023-03-06 RX ADMIN — OXYBUTYNIN CHLORIDE 5 MG: 5 TABLET ORAL at 20:31

## 2023-03-06 RX ADMIN — HEPARIN SODIUM 5000 UNITS: 5000 INJECTION INTRAVENOUS; SUBCUTANEOUS at 15:00

## 2023-03-06 RX ADMIN — DOCUSATE SODIUM 100 MG: 100 CAPSULE, LIQUID FILLED ORAL at 17:55

## 2023-03-06 RX ADMIN — SODIUM CHLORIDE 100 ML/HR: 0.9 INJECTION, SOLUTION INTRAVENOUS at 16:45

## 2023-03-06 RX ADMIN — ACETAMINOPHEN 325MG 975 MG: 325 TABLET ORAL at 20:31

## 2023-03-06 RX ADMIN — HEPARIN SODIUM 5000 UNITS: 5000 INJECTION INTRAVENOUS; SUBCUTANEOUS at 05:26

## 2023-03-06 RX ADMIN — HEPARIN SODIUM 5000 UNITS: 5000 INJECTION INTRAVENOUS; SUBCUTANEOUS at 23:04

## 2023-03-06 RX ADMIN — EZETIMIBE 10 MG: 10 TABLET ORAL at 08:46

## 2023-03-06 RX ADMIN — POLYETHYLENE GLYCOL 3350 17 G: 17 POWDER, FOR SOLUTION ORAL at 08:46

## 2023-03-06 RX ADMIN — BIMATOPROST 1 DROP: 0.1 SOLUTION/ DROPS OPHTHALMIC at 23:06

## 2023-03-06 RX ADMIN — LINEZOLID 600 MG: 600 TABLET, FILM COATED ORAL at 17:55

## 2023-03-06 RX ADMIN — OXYCODONE 5 MG: 5 TABLET ORAL at 10:25

## 2023-03-06 RX ADMIN — ARIPIPRAZOLE 2 MG: 2 TABLET ORAL at 08:46

## 2023-03-06 RX ADMIN — OXYBUTYNIN CHLORIDE 5 MG: 5 TABLET ORAL at 08:46

## 2023-03-06 RX ADMIN — LINEZOLID 600 MG: 600 TABLET, FILM COATED ORAL at 05:27

## 2023-03-06 RX ADMIN — OXYBUTYNIN CHLORIDE 5 MG: 5 TABLET ORAL at 16:42

## 2023-03-06 RX ADMIN — LIDOCAINE 5% 1 PATCH: 700 PATCH TOPICAL at 08:48

## 2023-03-06 RX ADMIN — OXYCODONE 5 MG: 5 TABLET ORAL at 18:00

## 2023-03-06 NOTE — NURSING NOTE
Agree with previous RN assessment  Assisted pt from commode to chair  Call bell and marco antonio within reach  Pt sister at bedside  Will continue to monitor

## 2023-03-06 NOTE — PLAN OF CARE
Problem: Potential for Falls  Goal: Patient will remain free of falls  Description: INTERVENTIONS:  - Educate patient/family on patient safety including physical limitations  - Instruct patient to call for assistance with activity   - Consult OT/PT to assist with strengthening/mobility   - Keep Call bell within reach  - Keep bed low and locked with side rails adjusted as appropriate  - Keep care items and personal belongings within reach  - Initiate and maintain comfort rounds  - Make Fall Risk Sign visible to staff  - Apply yellow socks and bracelet for high fall risk patients  - Consider moving patient to room near nurses station  Outcome: Progressing     Problem: Nutrition/Hydration-ADULT  Goal: Nutrient/Hydration intake appropriate for improving, restoring or maintaining nutritional needs  Description: Monitor and assess patient's nutrition/hydration status for malnutrition  Collaborate with interdisciplinary team and initiate plan and interventions as ordered  Monitor patient's weight and dietary intake as ordered or per policy  Utilize nutrition screening tool and intervene as necessary  Determine patient's food preferences and provide high-protein, high-caloric foods as appropriate       INTERVENTIONS:  - Monitor oral intake, urinary output, labs, and treatment plans  - Assess nutrition and hydration status and recommend course of action  - Evaluate amount of meals eaten  - Assist patient with eating if necessary   - Allow adequate time for meals  - Recommend/ encourage appropriate diets, oral nutritional supplements, and vitamin/mineral supplements  - Order, calculate, and assess calorie counts as needed  - Recommend, monitor, and adjust tube feedings and TPN/PPN based on assessed needs  - Assess need for intravenous fluids  - Provide specific nutrition/hydration education as appropriate  - Include patient/family/caregiver in decisions related to nutrition  Outcome: Progressing     Problem: MOBILITY - ADULT  Goal: Maintain or return to baseline ADL function  Description: INTERVENTIONS:  - Educate patient/family on patient safety including physical limitations  - Instruct patient to call for assistance with activity   - Consult OT/PT to assist with strengthening/mobility   - Keep Call bell within reach  - Keep bed low and locked with side rails adjusted as appropriate  - Keep care items and personal belongings within reach  - Initiate and maintain comfort rounds  - Make Fall Risk Sign visible to staff  - Apply yellow socks and bracelet for high fall risk patients  - Consider moving patient to room near nurses station  Outcome: Progressing  Goal: Maintains/Returns to pre admission functional level  Description: INTERVENTIONS:  - Perform BMAT or MOVE assessment daily    - Set and communicate daily mobility goal to care team and patient/family/caregiver     - Collaborate with rehabilitation services on mobility goals if consulted  - Record patient progress and toleration of activity level   Outcome: Progressing     Problem: Prexisting or High Potential for Compromised Skin Integrity  Goal: Skin integrity is maintained or improved  Description: INTERVENTIONS:  - Identify patients at risk for skin breakdown  - Assess and monitor skin integrity  - Assess and monitor nutrition and hydration status  - Monitor labs   - Assess for incontinence   - Turn and reposition patient  - Assist with mobility/ambulation  - Relieve pressure over bony prominences  - Avoid friction and shearing  - Provide appropriate hygiene as needed including keeping skin clean and dry  - Evaluate need for skin moisturizer/barrier cream  - Collaborate with interdisciplinary team   - Patient/family teaching  - Consider wound care consult   Outcome: Progressing     Problem: GENITOURINARY - ADULT  Goal: Maintains or returns to baseline urinary function  Description: INTERVENTIONS:  - Assess urinary function  - Encourage oral fluids to ensure adequate hydration if ordered  - Administer IV fluids as ordered to ensure adequate hydration  - Administer ordered medications as needed  - Offer frequent toileting  - Follow urinary retention protocol if ordered  Outcome: Progressing  Goal: Absence of urinary retention  Description: INTERVENTIONS:  - Assess patient’s ability to void and empty bladder  - Monitor I/O  - Bladder scan as needed  - Discuss with physician/AP medications to alleviate retention as needed  - Discuss catheterization for long term situations as appropriate  Outcome: Progressing  Goal: Urinary catheter remains patent  Description: INTERVENTIONS:  - Assess patency of urinary catheter  - If patient has a chronic wagner, consider changing catheter if non-functioning  - Follow guidelines for intermittent irrigation of non-functioning urinary catheter  Outcome: Progressing     Problem: METABOLIC, FLUID AND ELECTROLYTES - ADULT  Goal: Electrolytes maintained within normal limits  Description: INTERVENTIONS:  - Monitor labs and assess patient for signs and symptoms of electrolyte imbalances  - Administer electrolyte replacement as ordered  - Monitor response to electrolyte replacements, including repeat lab results as appropriate  - Instruct patient on fluid and nutrition as appropriate  Outcome: Progressing  Goal: Fluid balance maintained  Description: INTERVENTIONS:  - Monitor labs   - Monitor I/O and WT  - Instruct patient on fluid and nutrition as appropriate  - Assess for signs & symptoms of volume excess or deficit  Outcome: Progressing  Goal: Glucose maintained within target range  Description: INTERVENTIONS:  - Monitor Blood Glucose as ordered  - Assess for signs and symptoms of hyperglycemia and hypoglycemia  - Administer ordered medications to maintain glucose within target range  - Assess nutritional intake and initiate nutrition service referral as needed  Outcome: Progressing     Problem: SKIN/TISSUE INTEGRITY - ADULT  Goal: Skin Integrity remains intact(Skin Breakdown Prevention)  Description: Assess:  -Perform Ortega assessment every shift and prn  -Inspect skin when repositioning, toileting, and assisting with ADLS  -Assess extremities for adequate circulation and sensation     Bed Management:  -Have minimal linens on bed & keep smooth, unwrinkled  -Change linens as needed when moist or perspiring      Activity:  -Encourage activity and walks on unit  -Encourage or provide ROM exercises  -Use appropriate equipment to lift or move patient in bed    Skin Care:  -Avoid use of baby powder, tape, friction and shearing, hot water or constrictive clothing  -Do not massage red bony areas    Outcome: Progressing  Goal: Pressure injury heals and does not worsen  Description: Interventions:  - Implement low air loss mattress or specialty surface (Criteria met)  - Apply silicone foam dressing  - Apply fecal or urinary incontinence containment device   - Utilize friction reducing device or surface for transfers   - Consider nutrition services referral as needed  Outcome: Progressing     Problem: PAIN - ADULT  Goal: Verbalizes/displays adequate comfort level or baseline comfort level  Description: Interventions:  - Encourage patient to monitor pain and request assistance  - Assess pain using appropriate pain scale  - Administer analgesics based on type and severity of pain and evaluate response  - Implement non-pharmacological measures as appropriate and evaluate response  - Consider cultural and social influences on pain and pain management  - Notify physician/advanced practitioner if interventions unsuccessful or patient reports new pain  Outcome: Progressing     Problem: INFECTION - ADULT  Goal: Absence or prevention of progression during hospitalization  Description: INTERVENTIONS:  - Assess and monitor for signs and symptoms of infection  - Monitor lab/diagnostic results  - Monitor all insertion sites, i e  indwelling lines, tubes, and drains  - Monitor endotracheal if appropriate and nasal secretions for changes in amount and color  - [place] appropriate cooling/warming therapies per order  - Administer medications as ordered  - Instruct and encourage patient and family to use good hand hygiene technique  - Identify and instruct in appropriate isolation precautions for identified infection/condition  Outcome: Progressing     Problem: SAFETY ADULT  Goal: Patient will remain free of falls  Description: INTERVENTIONS:  - Educate patient/family on patient safety including physical limitations  - Instruct patient to call for assistance with activity   - Consult OT/PT to assist with strengthening/mobility   - Keep Call bell within reach  - Keep bed low and locked with side rails adjusted as appropriate  - Keep care items and personal belongings within reach  - Initiate and maintain comfort rounds  - Make Fall Risk Sign visible to staff  - Apply yellow socks and bracelet for high fall risk patients  - Consider moving patient to room near nurses station  Outcome: Progressing  Goal: Maintain or return to baseline ADL function  Description: INTERVENTIONS:  - Educate patient/family on patient safety including physical limitations  - Instruct patient to call for assistance with activity   - Consult OT/PT to assist with strengthening/mobility   - Keep Call bell within reach  - Keep bed low and locked with side rails adjusted as appropriate  - Keep care items and personal belongings within reach  - Initiate and maintain comfort rounds  - Make Fall Risk Sign visible to staff  - Apply yellow socks and bracelet for high fall risk patients  - Consider moving patient to room near nurses station  Outcome: Progressing  Goal: Maintains/Returns to pre admission functional level  Description: INTERVENTIONS:  - Perform BMAT or MOVE assessment daily    - Set and communicate daily mobility goal to care team and patient/family/caregiver     - Collaborate with rehabilitation services on mobility goals if consulted  - Out of bed for toileting  - Record patient progress and toleration of activity level   Outcome: Progressing

## 2023-03-06 NOTE — CASE MANAGEMENT
Case Management Discharge Planning Note    Patient name Demian Davis  Location [facility]  /E5 MS 63-* MRN [id number]  : 1945 Date 3/6/2023       Current Admission Date: 3/3/2023  Current Admission Diagnosis:Sepsis [facility])   Patient Active Problem List    Diagnosis Date Noted   • Moderate protein-calorie malnutrition ([facility] ) 2023   • Sepsis ([facility] ) 2023   • CAD (coronary artery disease) 2023   • Severe protein-calorie malnutrition ([facility] ) 2023   • History of pleural effusion 2023   • Nephrostomy status ([place] [address] ) 2023   • Acute on chronic blood loss anemia 2023   • Bacteremia due to Enterobacter species 2023   • Elevated brain natriuretic peptide (BNP) level 2023   • Ambulatory dysfunction 2023   • Influenza A 2023   • Elevated troponin 2023   • Right sided weakness 2022   • Stroke-like symptoms 2022   • Symptomatic hypotension 2022   • Insomnia 2022   • Right wrist drop 2022   • Chronic pleural effusion 2022   • Urinary tract infection 2022   • SOB (shortness of breath) 2022   • History of tobacco use disorder 2022   • Retroperitoneal lymphadenopathy 2022   • Pulmonary nodules 2022   • Syncope and collapse 2022   • Depression with suicidal ideation 2022   • Cancer related pain 2022   • Bilateral hydronephrosis 04/10/2022   • Stage 4 chronic kidney disease ([facility] ) 2022   • Fall 2022   • Hyperkalemia 2022   • Retained ureteral stent    • 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[facility] ) 2022   • Embolism and 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 ([facility] ) 2022   • Abnormal CT scan, bladder 2022   • Port-A-Cath in place 2022   • Continuous opioid dependence ([facility] ) 2021   • Hematuria 10/24/2021   • Acute urinary retention 10/21/2021   • Chronic pain syndrome 2021   • Lumbar spondylosis    • [facility] bronchitis ([facility] ) 03/02/2021   • Moderate major depression, single episode ([facility] ) 03/02/2021   • Thrombocytopenia ([facility] ) 03/02/2021   • 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 03/02/2021   • Gross hematuria 02/09/2021   • PAD (peripheral artery disease) ([facility] )    • Left carotid bruit    • Anemia of chronic disease 12/19/2020   • Preoperative clearance 12/19/2020   • Anemia 10/10/2020   • Diabetic ulcer of left foot associated with type 2 diabetes mellitus, 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 ([facility] ) 10/08/2020   • Acute kidney injury superimposed on CKD ([place] [address] )    • Dysuria 08/17/2020   • Diabetic polyneuropathy associated with type 2 diabetes mellitus ([facility] ) 07/16/2020   • Abnormal MRI, lumbar spine 06/29/2020   • Malignant neoplasm of anterior wall of urinary bladder ([place] [address] )    • Secondary renal hyperparathyroidism ([place] [address] ) 02/12/2020   • Preop examination 04/16/2019   • Superficial phlebitis 03/07/2019   • Arteriosclerosis of artery of extremity ([facility] ) 02/22/2019   • Stable angina pectoris ([facility] ) 02/22/2019   • Herpes zoster without complication 33/49/2281   • Localized edema 02/22/2019   • Back pain 01/31/2019   • Degeneration of lumbar intervertebral disc 12/03/2018   • Primary osteoarthritis of left knee 03/16/2018   • Bladder carcinoma ([facility] ) 08/16/2016   • Presence of stent in coronary artery 08/16/2016   • Hypertension with renal disease 10/29/2015   • Hyperlipidemia 10/29/2015   • Cystitis due to intravesical BCG administration 09/24/2015   • Coronary artery disease of native artery of native heart with stable angina pectoris ([facility] ) 05/19/2011   • Type 2 diabetes mellitus, with long-term current use of insulin ([facility] ) 01/09/2004      LOS (days): 3  Geometric Mean LOS (GMLOS) (days): 3 50  Days to GMLOS:0 6     OBJECTIVE:  Risk of Unplanned Readmission Score: 64 07         Current admission status: Inpatient   Preferred Pharmacy:   [address] [place] [address] [place] [address]  Phone: [phone] Fax: [address], [address] [facility]  [address]  [address] [place] [address]  Phone: [phone] Fax: [phone]    Primary Care Provider: Piter Ocasio MD    Primary Insurance: MEDICARE  Secondary Insurance: BLUE CROSS    DISCHARGE DETAILS:    Discharge planning discussed with[de-identified] Kira Chatterjee (Spouse)  [phone] [facility]  Freedom of Choice: Yes  Comments - Freedom of Choice: Spouse states that Pt will return home with Home Health, established by [facility]  CM contacted family/caregiver?: Yes (Spouse at bedside)  Were Treatment Team discharge recommendations reviewed with patient/caregiver?: Yes  Did patient/caregiver verbalize understanding of patient care needs?: Yes    Contacts  Patient Contacts: Kira Leidybettie (Spouse)  [phone] (M)  Relationship to Patient[de-identified] Family  Contact Method: In Person  Reason/Outcome: Emergency Contact, Discharge Planning, Continuity of Care    Transport at Discharge : Automobile  Transfer Mode: Yonie Deer by: Family member    IMM Given (Date):: 03/06/23    Family notified[de-identified] Kira Chatterjee (Spouse)  [phone] (M)  Additional Comments: CM met w/ Pt and spouse at bedside  Pt receiving Pt care during this visit  Spouse was looking for contacts in Pts phone, however she did take a few moments to inform CM that Pt is already set up with VNA services  Spouse confirmed Nurse visits, however did not confirm other disciplines  Spouse reports that Home health services were set up by [facility]  Spouse dismissed CM stating there is nothing more they will need  CM is inclined to believe that Pt will transport home with family by car  CM will remain available through Pts discharge to home

## 2023-03-06 NOTE — PLAN OF CARE
Problem: Potential for Falls  Goal: Patient will remain free of falls  Description: INTERVENTIONS:  - Educate patient/family on patient safety including physical limitations  - Instruct patient to call for assistance with activity   - Consult OT/PT to assist with strengthening/mobility   - Keep Call bell within reach  - Keep bed low and locked with side rails adjusted as appropriate  - Keep care items and personal belongings within reach  - Initiate and maintain comfort rounds  - Make Fall Risk Sign visible to staff  - Apply yellow socks and bracelet for high fall risk patients  - Consider moving patient to room near nurses station  Outcome: Progressing     Problem: Nutrition/Hydration-ADULT  Goal: Nutrient/Hydration intake appropriate for improving, restoring or maintaining nutritional needs  Description: Monitor and assess patient's nutrition/hydration status for malnutrition  Collaborate with interdisciplinary team and initiate plan and interventions as ordered  Monitor patient's weight and dietary intake as ordered or per policy  Utilize nutrition screening tool and intervene as necessary  Determine patient's food preferences and provide high-protein, high-caloric foods as appropriate       INTERVENTIONS:  - Monitor oral intake, urinary output, labs, and treatment plans  - Assess nutrition and hydration status and recommend course of action  - Evaluate amount of meals eaten  - Assist patient with eating if necessary   - Allow adequate time for meals  - Recommend/ encourage appropriate diets, oral nutritional supplements, and vitamin/mineral supplements  - Order, calculate, and assess calorie counts as needed  - Recommend, monitor, and adjust tube feedings and TPN/PPN based on assessed needs  - Assess need for intravenous fluids  - Provide specific nutrition/hydration education as appropriate  - Include patient/family/caregiver in decisions related to nutrition  Outcome: Progressing     Problem: MOBILITY - ADULT  Goal: Maintain or return to baseline ADL function  Description: INTERVENTIONS:  - Educate patient/family on patient safety including physical limitations  - Instruct patient to call for assistance with activity   - Consult OT/PT to assist with strengthening/mobility   - Keep Call bell within reach  - Keep bed low and locked with side rails adjusted as appropriate  - Keep care items and personal belongings within reach  - Initiate and maintain comfort rounds  - Make Fall Risk Sign visible to staff  - Apply yellow socks and bracelet for high fall risk patients  - Consider moving patient to room near nurses station  Outcome: Progressing     Problem: Prexisting or High Potential for Compromised Skin Integrity  Goal: Skin integrity is maintained or improved  Description: INTERVENTIONS:  - Identify patients at risk for skin breakdown  - Assess and monitor skin integrity  - Assess and monitor nutrition and hydration status  - Monitor labs   - Assess for incontinence   - Turn and reposition patient  - Assist with mobility/ambulation  - Relieve pressure over bony prominences  - Avoid friction and shearing  - Provide appropriate hygiene as needed including keeping skin clean and dry  - Evaluate need for skin moisturizer/barrier cream  - Collaborate with interdisciplinary team   - Patient/family teaching  - Consider wound care consult   Outcome: Progressing     Problem: GENITOURINARY - ADULT  Goal: Maintains or returns to baseline urinary function  Description: INTERVENTIONS:  - Assess urinary function  - Encourage oral fluids to ensure adequate hydration if ordered  - Administer IV fluids as ordered to ensure adequate hydration  - Administer ordered medications as needed  - Offer frequent toileting  - Follow urinary retention protocol if ordered  Outcome: Progressing     Problem: METABOLIC, FLUID AND ELECTROLYTES - ADULT  Goal: Electrolytes maintained within normal limits  Description: INTERVENTIONS:  - Monitor labs and assess patient for signs and symptoms of electrolyte imbalances  - Administer electrolyte replacement as ordered  - Monitor response to electrolyte replacements, including repeat lab results as appropriate  - Instruct patient on fluid and nutrition as appropriate  Outcome: Progressing  Goal: Fluid balance maintained  Description: INTERVENTIONS:  - Monitor labs   - Monitor I/O and WT  - Instruct patient on fluid and nutrition as appropriate  - Assess for signs & symptoms of volume excess or deficit  Outcome: Progressing  Goal: Glucose maintained within target range  Description: INTERVENTIONS:  - Monitor Blood Glucose as ordered  - Assess for signs and symptoms of hyperglycemia and hypoglycemia  - Administer ordered medications to maintain glucose within target range  - Assess nutritional intake and initiate nutrition service referral as needed  Outcome: Progressing     Problem: SKIN/TISSUE INTEGRITY - ADULT  Goal: Skin Integrity remains intact(Skin Breakdown Prevention)  Description: Assess:  -Perform Ortega assessment every shift and prn  -Inspect skin when repositioning, toileting, and assisting with ADLS  -Assess extremities for adequate circulation and sensation     Bed Management:  -Have minimal linens on bed & keep smooth, unwrinkled  -Change linens as needed when moist or perspiring      Activity:  -Encourage activity and walks on unit  -Encourage or provide ROM exercises  -Use appropriate equipment to lift or move patient in bed    Skin Care:  -Avoid use of baby powder, tape, friction and shearing, hot water or constrictive clothing  -Do not massage red bony areas    Outcome: Progressing     Problem: PAIN - ADULT  Goal: Verbalizes/displays adequate comfort level or baseline comfort level  Description: Interventions:  - Encourage patient to monitor pain and request assistance  - Assess pain using appropriate pain scale  - Administer analgesics based on type and severity of pain and evaluate response  - Implement non-pharmacological measures as appropriate and evaluate response  - Consider cultural and social influences on pain and pain management  - Notify physician/advanced practitioner if interventions unsuccessful or patient reports new pain  Outcome: Progressing     Problem: INFECTION - ADULT  Goal: Absence or prevention of progression during hospitalization  Description: INTERVENTIONS:  - Assess and monitor for signs and symptoms of infection  - Monitor lab/diagnostic results  - Monitor all insertion sites, i e  indwelling lines, tubes, and drains  - Monitor endotracheal if appropriate and nasal secretions for changes in amount and color  - Huggins appropriate cooling/warming therapies per order  - Administer medications as ordered  - Instruct and encourage patient and family to use good hand hygiene technique  - Identify and instruct in appropriate isolation precautions for identified infection/condition  Outcome: Progressing     Problem: SAFETY ADULT  Goal: Patient will remain free of falls  Description: INTERVENTIONS:  - Educate patient/family on patient safety including physical limitations  - Instruct patient to call for assistance with activity   - Consult OT/PT to assist with strengthening/mobility   - Keep Call bell within reach  - Keep bed low and locked with side rails adjusted as appropriate  - Keep care items and personal belongings within reach  - Initiate and maintain comfort rounds  - Make Fall Risk Sign visible to staff  - Apply yellow socks and bracelet for high fall risk patients  - Consider moving patient to room near nurses station  Outcome: Progressing  Goal: Maintain or return to baseline ADL function  Description: INTERVENTIONS:  - Educate patient/family on patient safety including physical limitations  - Instruct patient to call for assistance with activity   - Consult OT/PT to assist with strengthening/mobility   - Keep Call bell within reach  - Keep bed low and locked with side rails adjusted as appropriate  - Keep care items and personal belongings within reach  - Initiate and maintain comfort rounds  - Make Fall Risk Sign visible to staff  - Apply yellow socks and bracelet for high fall risk patients  - Consider moving patient to room near nurses station  Outcome: Progressing

## 2023-03-06 NOTE — TELEPHONE ENCOUNTER
Called patient with new appt made for 3/28/23 at [facility] location   Patient was in hospital at the time I called and said that he will call back    I also let him know alexander the appt will show in [facility] Chart

## 2023-03-07 PROBLEM — F32.A DEPRESSION: Status: ACTIVE | Noted: 2023-03-07

## 2023-03-07 LAB
ANION GAP SERPL CALCULATED.3IONS-SCNC: 9 MMOL/L (ref 4–13)
BUN SERPL-MCNC: 47 MG/DL (ref 5–25)
CALCIUM SERPL-MCNC: 10.3 MG/DL (ref 8.4–10.2)
CHLORIDE SERPL-SCNC: 99 MMOL/L (ref 96–108)
CO2 SERPL-SCNC: 25 MMOL/L (ref 21–32)
CREAT SERPL-MCNC: 2.49 MG/DL (ref 0.6–1.3)
GFR SERPL CREATININE-BSD FRML MDRD: 23 ML/MIN/1.73SQ M
GLUCOSE SERPL-MCNC: 116 MG/DL (ref 65–140)
GLUCOSE SERPL-MCNC: 124 MG/DL (ref 65–140)
GLUCOSE SERPL-MCNC: 72 MG/DL (ref 65–140)
GLUCOSE SERPL-MCNC: 78 MG/DL (ref 65–140)
GLUCOSE SERPL-MCNC: 78 MG/DL (ref 65–140)
GLUCOSE SERPL-MCNC: 94 MG/DL (ref 65–140)
POTASSIUM SERPL-SCNC: 5.2 MMOL/L (ref 3.5–5.3)
SODIUM SERPL-SCNC: 133 MMOL/L (ref 135–147)

## 2023-03-07 RX ORDER — ONDANSETRON 4 MG/1
4 TABLET, ORALLY DISINTEGRATING ORAL 4 TIMES DAILY
Status: DISCONTINUED | OUTPATIENT
Start: 2023-03-07 | End: 2023-03-13 | Stop reason: HOSPADM

## 2023-03-07 RX ORDER — HYDROMORPHONE HCL IN WATER/PF 6 MG/30 ML
0.2 PATIENT CONTROLLED ANALGESIA SYRINGE INTRAVENOUS EVERY 4 HOURS PRN
Status: DISCONTINUED | OUTPATIENT
Start: 2023-03-07 | End: 2023-03-07

## 2023-03-07 RX ORDER — ONDANSETRON 4 MG/1
4 TABLET, ORALLY DISINTEGRATING ORAL EVERY 6 HOURS SCHEDULED
Status: DISCONTINUED | OUTPATIENT
Start: 2023-03-07 | End: 2023-03-07

## 2023-03-07 RX ORDER — OXYCODONE HYDROCHLORIDE 5 MG/1
5 TABLET ORAL EVERY 4 HOURS PRN
Status: DISCONTINUED | OUTPATIENT
Start: 2023-03-07 | End: 2023-03-10

## 2023-03-07 RX ADMIN — METOPROLOL TARTRATE 25 MG: 25 TABLET, FILM COATED ORAL at 22:09

## 2023-03-07 RX ADMIN — OXYCODONE 5 MG: 5 TABLET ORAL at 22:09

## 2023-03-07 RX ADMIN — OXYBUTYNIN CHLORIDE 5 MG: 5 TABLET ORAL at 18:22

## 2023-03-07 RX ADMIN — LINEZOLID 600 MG: 600 TABLET, FILM COATED ORAL at 05:30

## 2023-03-07 RX ADMIN — LIDOCAINE 5% 1 PATCH: 700 PATCH TOPICAL at 09:03

## 2023-03-07 RX ADMIN — ARIPIPRAZOLE 2 MG: 2 TABLET ORAL at 09:03

## 2023-03-07 RX ADMIN — OXYCODONE 5 MG: 5 TABLET ORAL at 05:30

## 2023-03-07 RX ADMIN — HEPARIN SODIUM 5000 UNITS: 5000 INJECTION INTRAVENOUS; SUBCUTANEOUS at 05:30

## 2023-03-07 RX ADMIN — INSULIN GLARGINE 10 UNITS: 100 INJECTION, SOLUTION SUBCUTANEOUS at 22:10

## 2023-03-07 RX ADMIN — LINEZOLID 600 MG: 600 TABLET, FILM COATED ORAL at 18:25

## 2023-03-07 RX ADMIN — ONDANSETRON 4 MG: 4 TABLET, ORALLY DISINTEGRATING ORAL at 14:09

## 2023-03-07 RX ADMIN — OXYBUTYNIN CHLORIDE 5 MG: 5 TABLET ORAL at 09:03

## 2023-03-07 RX ADMIN — PANTOPRAZOLE SODIUM 40 MG: 40 TABLET, DELAYED RELEASE ORAL at 05:30

## 2023-03-07 RX ADMIN — GABAPENTIN 300 MG: 300 CAPSULE ORAL at 22:09

## 2023-03-07 RX ADMIN — BIMATOPROST 1 DROP: 0.1 SOLUTION/ DROPS OPHTHALMIC at 22:10

## 2023-03-07 RX ADMIN — OXYBUTYNIN CHLORIDE 5 MG: 5 TABLET ORAL at 22:10

## 2023-03-07 RX ADMIN — HEPARIN SODIUM 5000 UNITS: 5000 INJECTION INTRAVENOUS; SUBCUTANEOUS at 22:09

## 2023-03-07 RX ADMIN — HEPARIN SODIUM 5000 UNITS: 5000 INJECTION INTRAVENOUS; SUBCUTANEOUS at 14:10

## 2023-03-07 RX ADMIN — Medication 2.5 MG: at 23:17

## 2023-03-07 RX ADMIN — ONDANSETRON 4 MG: 4 TABLET, ORALLY DISINTEGRATING ORAL at 22:10

## 2023-03-07 RX ADMIN — EZETIMIBE 10 MG: 10 TABLET ORAL at 09:02

## 2023-03-07 RX ADMIN — DOCUSATE SODIUM 100 MG: 100 CAPSULE, LIQUID FILLED ORAL at 09:03

## 2023-03-07 RX ADMIN — OXYCODONE 5 MG: 5 TABLET ORAL at 10:03

## 2023-03-07 RX ADMIN — OXYCODONE 5 MG: 5 TABLET ORAL at 18:28

## 2023-03-07 RX ADMIN — ASPIRIN 81 MG: 81 TABLET, COATED ORAL at 09:03

## 2023-03-07 RX ADMIN — METOPROLOL TARTRATE 25 MG: 25 TABLET, FILM COATED ORAL at 09:03

## 2023-03-07 NOTE — ASSESSMENT & PLAN NOTE
Malnutrition Findings:   Adult Malnutrition type: Chronic illness  Adult Degree of Malnutrition: Malnutrition of moderate degree  Malnutrition Characteristics: Inadequate energy, Weight loss, Fluid accumulation  360 Statement: PCM r/t cancer with chemoradiation AEB, a 6 6% unintended wt loss from 01/02/23 to present and a 12 8% unintended wt loss from 08/26/22 to present and < 75% energy intake compared to estimated energy needs for > 1 month  BMI Findings: Body mass index is 23 51 kg/m²

## 2023-03-07 NOTE — ASSESSMENT & PLAN NOTE
· Invasive carcinoma of bladder known to urology service  · No evidence of bleeding this admission  · Following up with [facility] later this week

## 2023-03-07 NOTE — ASSESSMENT & PLAN NOTE
· Stable at baseline    Results from last 7 days   Lab Units 03/06/23  0524 03/05/23  0620 03/04/23  0617 03/03/23  1754   BUN mg/dL 46* 47* 48* 50*   CREATININE mg/dL 2 37* 2 40* 2 39* 2 50*   EGFR ml/min/1 73sq m 24 24 24 23

## 2023-03-07 NOTE — ASSESSMENT & PLAN NOTE
Malnutrition Findings:   Adult Malnutrition type: Chronic illness  Adult Degree of Malnutrition: Malnutrition of moderate degree  Malnutrition Characteristics: Inadequate energy, Weight loss, Fluid accumulation  360 Statement: PCM r/t cancer with chemoradiation AEB, a 6 6% unintended wt loss from 01/02/23 to present and a 12 8% unintended wt loss from 08/26/22 to present and < 75% energy intake compared to estimated energy needs for > 1 month  BMI Findings: Body mass index is 23 48 kg/m²  liquor

## 2023-03-07 NOTE — ASSESSMENT & PLAN NOTE
· Bilateral hydronephrosis secondary to bladder cancer with bilateral nephrostomy tubes in place  · No new urology recommendations at this time

## 2023-03-07 NOTE — PLAN OF CARE
Problem: Potential for Falls  Goal: Patient will remain free of falls  Description: INTERVENTIONS:  - Educate patient/family on patient safety including physical limitations  - Instruct patient to call for assistance with activity   - Consult OT/PT to assist with strengthening/mobility   - Keep Call bell within reach  - Keep bed low and locked with side rails adjusted as appropriate  - Keep care items and personal belongings within reach  - Initiate and maintain comfort rounds  - Make Fall Risk Sign visible to staff  - Apply yellow socks and bracelet for high fall risk patients  - Consider moving patient to room near nurses station  Outcome: Progressing     Problem: Nutrition/Hydration-ADULT  Goal: Nutrient/Hydration intake appropriate for improving, restoring or maintaining nutritional needs  Description: Monitor and assess patient's nutrition/hydration status for malnutrition  Collaborate with interdisciplinary team and initiate plan and interventions as ordered  Monitor patient's weight and dietary intake as ordered or per policy  Utilize nutrition screening tool and intervene as necessary  Determine patient's food preferences and provide high-protein, high-caloric foods as appropriate       INTERVENTIONS:  - Monitor oral intake, urinary output, labs, and treatment plans  - Assess nutrition and hydration status and recommend course of action  - Evaluate amount of meals eaten  - Assist patient with eating if necessary   - Allow adequate time for meals  - Recommend/ encourage appropriate diets, oral nutritional supplements, and vitamin/mineral supplements  - Order, calculate, and assess calorie counts as needed  - Recommend, monitor, and adjust tube feedings and TPN/PPN based on assessed needs  - Assess need for intravenous fluids  - Provide specific nutrition/hydration education as appropriate  - Include patient/family/caregiver in decisions related to nutrition  Outcome: Progressing     Problem: MOBILITY - ADULT  Goal: Maintain or return to baseline ADL function  Description: INTERVENTIONS:  - Educate patient/family on patient safety including physical limitations  - Instruct patient to call for assistance with activity   - Consult OT/PT to assist with strengthening/mobility   - Keep Call bell within reach  - Keep bed low and locked with side rails adjusted as appropriate  - Keep care items and personal belongings within reach  - Initiate and maintain comfort rounds  - Make Fall Risk Sign visible to staff  - Apply yellow socks and bracelet for high fall risk patients  - Consider moving patient to room near nurses station  Outcome: Progressing  Goal: Maintains/Returns to pre admission functional level  Description: INTERVENTIONS:  - Perform BMAT or MOVE assessment daily    - Set and communicate daily mobility goal to care team and patient/family/caregiver  - Collaborate with rehabilitation services on mobility goals if consulted  - Perform Range of Motion 3 times a day  - Reposition patient every 2 hours    - Dangle patient 3 times a day  - Stand patient 3 times a day  - Ambulate patient 3 times a day  - Out of bed to chair 3 times a day   - Out of bed for meals 3 times a day  - Out of bed for toileting  - Record patient progress and toleration of activity level   Outcome: Progressing     Problem: Prexisting or High Potential for Compromised Skin Integrity  Goal: Skin integrity is maintained or improved  Description: INTERVENTIONS:  - Identify patients at risk for skin breakdown  - Assess and monitor skin integrity  - Assess and monitor nutrition and hydration status  - Monitor labs   - Assess for incontinence   - Turn and reposition patient  - Assist with mobility/ambulation  - Relieve pressure over bony prominences  - Avoid friction and shearing  - Provide appropriate hygiene as needed including keeping skin clean and dry  - Evaluate need for skin moisturizer/barrier cream  - Collaborate with interdisciplinary team - Patient/family teaching  - Consider wound care consult   Outcome: Progressing     Problem: GENITOURINARY - ADULT  Goal: Maintains or returns to baseline urinary function  Description: INTERVENTIONS:  - Assess urinary function  - Encourage oral fluids to ensure adequate hydration if ordered  - Administer IV fluids as ordered to ensure adequate hydration  - Administer ordered medications as needed  - Offer frequent toileting  - Follow urinary retention protocol if ordered  Outcome: Progressing     Problem: METABOLIC, FLUID AND ELECTROLYTES - ADULT  Goal: Electrolytes maintained within normal limits  Description: INTERVENTIONS:  - Monitor labs and assess patient for signs and symptoms of electrolyte imbalances  - Administer electrolyte replacement as ordered  - Monitor response to electrolyte replacements, including repeat lab results as appropriate  - Instruct patient on fluid and nutrition as appropriate  Outcome: Progressing  Goal: Fluid balance maintained  Description: INTERVENTIONS:  - Monitor labs   - Monitor I/O and WT  - Instruct patient on fluid and nutrition as appropriate  - Assess for signs & symptoms of volume excess or deficit  Outcome: Progressing  Goal: Glucose maintained within target range  Description: INTERVENTIONS:  - Monitor Blood Glucose as ordered  - Assess for signs and symptoms of hyperglycemia and hypoglycemia  - Administer ordered medications to maintain glucose within target range  - Assess nutritional intake and initiate nutrition service referral as needed  Outcome: Progressing     Problem: SKIN/TISSUE INTEGRITY - ADULT  Goal: Skin Integrity remains intact(Skin Breakdown Prevention)  Description: Assess:  -Perform Ortega assessment every shift and prn  -Inspect skin when repositioning, toileting, and assisting with ADLS  -Assess extremities for adequate circulation and sensation     Bed Management:  -Have minimal linens on bed & keep smooth, unwrinkled  -Change linens as needed when moist or perspiring      Activity:  -Encourage activity and walks on unit  -Encourage or provide ROM exercises  -Use appropriate equipment to lift or move patient in bed    Skin Care:  -Avoid use of baby powder, tape, friction and shearing, hot water or constrictive clothing  -Do not massage red bony areas    Outcome: Progressing  Goal: Pressure injury heals and does not worsen  Description: Interventions:  - Implement low air loss mattress or specialty surface (Criteria met)  - Apply silicone foam dressing  - Apply fecal or urinary incontinence containment device   - Utilize friction reducing device or surface for transfers   - Consider nutrition services referral as needed  Outcome: Progressing     Problem: PAIN - ADULT  Goal: Verbalizes/displays adequate comfort level or baseline comfort level  Description: Interventions:  - Encourage patient to monitor pain and request assistance  - Assess pain using appropriate pain scale  - Administer analgesics based on type and severity of pain and evaluate response  - Implement non-pharmacological measures as appropriate and evaluate response  - Consider cultural and social influences on pain and pain management  - Notify physician/advanced practitioner if interventions unsuccessful or patient reports new pain  Outcome: Progressing     Problem: INFECTION - ADULT  Goal: Absence or prevention of progression during hospitalization  Description: INTERVENTIONS:  - Assess and monitor for signs and symptoms of infection  - Monitor lab/diagnostic results  - Monitor all insertion sites, i e  indwelling lines, tubes, and drains  - Monitor endotracheal if appropriate and nasal secretions for changes in amount and color  - [place] appropriate cooling/warming therapies per order  - Administer medications as ordered  - Instruct and encourage patient and family to use good hand hygiene technique  - Identify and instruct in appropriate isolation precautions for identified infection/condition  Outcome: Progressing     Problem: SAFETY ADULT  Goal: Patient will remain free of falls  Description: INTERVENTIONS:  - Educate patient/family on patient safety including physical limitations  - Instruct patient to call for assistance with activity   - Consult OT/PT to assist with strengthening/mobility   - Keep Call bell within reach  - Keep bed low and locked with side rails adjusted as appropriate  - Keep care items and personal belongings within reach  - Initiate and maintain comfort rounds  - Make Fall Risk Sign visible to staff  - Apply yellow socks and bracelet for high fall risk patients  - Consider moving patient to room near nurses station  Outcome: Progressing  Goal: Maintain or return to baseline ADL function  Description: INTERVENTIONS:  - Educate patient/family on patient safety including physical limitations  - Instruct patient to call for assistance with activity   - Consult OT/PT to assist with strengthening/mobility   - Keep Call bell within reach  - Keep bed low and locked with side rails adjusted as appropriate  - Keep care items and personal belongings within reach  - Initiate and maintain comfort rounds  - Make Fall Risk Sign visible to staff  - Apply yellow socks and bracelet for high fall risk patients  - Consider moving patient to room near nurses station  Outcome: Progressing  Goal: Maintains/Returns to pre admission functional level  Description: INTERVENTIONS:  - Perform BMAT or MOVE assessment daily    - Set and communicate daily mobility goal to care team and patient/family/caregiver  - Collaborate with rehabilitation services on mobility goals if consulted  - Perform Range of Motion 3 times a day  - Reposition patient every 2 hours    - Dangle patient 3 times a day  - Stand patient 3 times a day  - Ambulate patient 3 times a day  - Out of bed to chair 3 times a day   - Out of bed for meals 3 times a day  - Out of bed for toileting  - Record patient progress and toleration of activity level   Outcome: Progressing

## 2023-03-07 NOTE — PROGRESS NOTES
[facility]  Progress Note - Saman Patton 1945, [de-identified] y o  male MRN: [id number]  Unit/Bed#: E5 -01 Encounter: [id number]  Primary Care Provider: Bernadette Griffin MD   Date and time admitted to hospital: 3/3/2023  2:31 PM    * Sepsis [facility])  Assessment & Plan  · Sepsis on admission secondary to VRE based on outside urine culture on 2/27/2023  · Case was discussed during hospitalization with ID  · Continue linezolid for 7-day course  · Blood cultures negative    Hyponatremia  Assessment & Plan  · Nonspecific hyponatremia may be secondary to poor intake  · Give IV fluids overnight and recheck labs in a m  Results from last 7 days   Lab Units 03/06/23  1549 03/06/23  0524 03/05/23  0620 03/04/23  0617   SODIUM mmol/L 132* 132* 133* 134*       Moderate protein-calorie malnutrition (HCC)  Assessment & Plan  Malnutrition Findings:   Adult Malnutrition type: Chronic illness  Adult Degree of Malnutrition: Malnutrition of moderate degree  Malnutrition Characteristics: Inadequate energy, Weight loss, Fluid accumulation  360 Statement: PCM r/t cancer with chemoradiation AEB, a 6 6% unintended wt loss from 01/02/23 to present and a 12 8% unintended wt loss from 08/26/22 to present and < 75% energy intake compared to estimated energy needs for > 1 month  BMI Findings: Body mass index is 23 48 kg/m²  CAD (coronary artery disease)  Assessment & Plan  · CAD with history of PCI  No chest pain      Cancer related pain  Assessment & Plan  · Continue oxycodone as needed    Bilateral hydronephrosis  Assessment & Plan  · Bilateral hydronephrosis secondary to bladder cancer with bilateral nephrostomy tubes in place  · No new urology recommendations at this time    Stage 4 chronic kidney disease ([facility] [address] )  Assessment & Plan  · Stable at baseline    Results from last 7 days   Lab Units 03/06/23  0524 03/05/23  0620 03/04/23  0617 03/03/23  1754   BUN mg/dL 46* 47* 48* 50*   CREATININE mg/dL 2 37* 2 40* 2 39* 2 50*   EGFR ml/min/1 73sq m 24 24 24 23       Bladder carcinoma (HCC)  Assessment & Plan  · Invasive carcinoma of bladder known to urology service  · No evidence of bleeding this admission  · Following up with [facility] later this week      VTE Pharmacologic Prophylaxis: VTE Score: 8 High Risk (Score >/= 5) - Pharmacological DVT Prophylaxis Ordered: heparin  Sequential Compression Devices Ordered  Patient Centered Rounds: I have performed bedside rounds with nursing staff today  Discussions with Specialists or Other Care Team Provider: Case management    Education and Discussions with Family / Patient: Updated  (wife) at bedside  Also discussed with granddaughter on telephone    Time Spent for Care: This time was spent on one or more of the following: performing physical exam; counseling and coordination of care; obtaining or reviewing history; documenting in the medical record; reviewing/ordering tests, medications or procedures; communicating with other healthcare professionals and discussing with patient's family/caregivers  Current Length of Stay: 3 day(s)  Current Patient Status: Inpatient   Certification Statement: The patient will continue to require additional inpatient hospital stay due to Hyponatremia  Discharge Plan: Anticipate discharge in 24-48 hrs to home with home services  Code Status: Level 3 - DNAR and DNI      Subjective:   Patient seen and examined  No new complaints  Is feeling weak  Objective:   Vitals: Blood pressure 128/72, pulse 94, temperature 98 3 °F (36 8 °C), temperature source Oral, resp  rate 17, height 6' 4" (1 93 m), weight 87 5 kg (192 lb 14 4 oz), SpO2 98 %  Intake/Output Summary (Last 24 hours) at 3/6/2023 1926  Last data filed at 3/6/2023 1735  Gross per 24 hour   Intake 560 ml   Output 2000 ml   Net -1440 ml       Physical Exam  Vitals reviewed  Constitutional:       General: He is not in acute distress    HENT:      Head: Atraumatic  Cardiovascular:      Rate and Rhythm: Regular rhythm  Heart sounds: Normal heart sounds  Pulmonary:      Effort: Pulmonary effort is normal       Breath sounds: No wheezing  Abdominal:      General: Bowel sounds are normal       Palpations: Abdomen is soft  Tenderness: There is no abdominal tenderness  There is no rebound  Genitourinary:     Comments: Bilateral nephrostomy tubes present  Musculoskeletal:         General: No swelling or tenderness  Skin:     General: Skin is warm  Neurological:      General: No focal deficit present  Mental Status: He is alert  Cranial Nerves: No cranial nerve deficit  Psychiatric:         Mood and Affect: Mood normal        Additional Data:   Labs:  Results from last 7 days   Lab Units 03/06/23  0524 03/05/23  0620 03/04/23  0617   WBC Thousand/uL 15 69* 15 56* 15 79*   HEMOGLOBIN g/dL 8 4* 8 3* 8 5*   PLATELETS Thousands/uL 237 247 251   MCV fL 91 92 91     Results from last 7 days   Lab Units 03/06/23  1549 03/06/23  0524 03/05/23  0620 03/04/23  0617   SODIUM mmol/L 132* 132* 133* 134*   POTASSIUM mmol/L  --  5 1 4 7 4 6   CHLORIDE mmol/L  --  102 101 101   CO2 mmol/L  --  22 25 25   ANION GAP mmol/L  --  8 7 8   BUN mg/dL  --  46* 47* 48*   CREATININE mg/dL  --  2 37* 2 40* 2 39*   CALCIUM mg/dL  --  10 0 10 3* 10 1   EGFR ml/min/1 73sq m  --  24 24 24   GLUCOSE RANDOM mg/dL  --  71 120 109                      Results from last 7 days   Lab Units 03/03/23  1534   LACTIC ACID mmol/L 1 1     Results from last 7 days   Lab Units 03/06/23  1634 03/06/23  1046 03/06/23  0744 03/05/23  2054 03/05/23  1604 03/05/23  1158 03/05/23  0759 03/04/23  2119 03/04/23  1551 03/04/23  1118 03/04/23  0729 03/03/23  2102   POC GLUCOSE mg/dl 97 131 71 83 113 90 99 154* 139 119 109 215*     Results from last 7 days   Lab Units 03/03/23  1534   HEMOGLOBIN A1C % 6 7*         * I Have Reviewed All Lab Data Listed Above      Cultures:   Results from last 7 days   Lab Units 03/03/23  1702 03/03/23  1646 03/03/23  1534   BLOOD CULTURE   --  No Growth at 48 hrs  No Growth at 48 hrs  URINE CULTURE  >100,000 cfu/ml  --   --                  Lines/Drains:  Invasive Devices     Drain  Duration           Nephrostomy Left 10 2 Fr  55 days    Nephrostomy Right 10 2 Fr  55 days              Telemetry:      Imaging:  Imaging Reports Reviewed Today Include:   XR chest pa & lateral    Result Date: 3/5/2023  Impression: Similar patchy opacity of the left base compared to the PET CT of 3/1/2023, which may represent atelectasis, tumor, and/or infection/inflammation, or combination thereof  Stable small left pleural effusion compared to the PET CT of 3/1/2023  No pneumothorax   Workstation performed: [id number]       Scheduled Meds:  Current Facility-Administered Medications   Medication Dose Route Frequency Provider Last Rate   • acetaminophen  975 mg Oral Q8H PRN La Griffin PA-C     • aluminum-magnesium hydroxide-simethicone  30 mL Oral Q6H PRN Tayler , DO     • ARIPiprazole  2 mg Oral Daily Piyush Acosta [place], DO     • aspirin  81 mg Oral Daily Piyush Pulido, DO     • bimatoprost  1 drop Both Eyes HS Piyush Pulido, DO     • docusate sodium  100 mg Oral BID Tayler , DO     • ezetimibe  10 mg Oral Daily Piyush Acosta [place], DO     • gabapentin  300 mg Oral HS Piyush Pulido, DO     • heparin (porcine)  5,000 Units Subcutaneous [facility] Piyush Pulido, DO     • insulin glargine  10 Units Subcutaneous HS Piyush Pulido, DO     • lidocaine  1 patch Topical Daily Joselin Márquez PA-C     • linezolid  600 mg Oral Q12H Piyush Karla [place], DO     • metoprolol tartrate  25 mg Oral Q12H [address] Piyush Pulido, DO     • ondansetron  4 mg Intravenous Q6H PRN Tayler , DO     • oxybutynin  5 mg Oral TID Tayler , DO     • oxyCODONE  5 mg Oral Q4H PRN Tayler , DO     • pantoprazole  40 mg Oral Daily Before Breakfast Piyush Pulido, DO     • polyethylene glycol  17 g Oral BID Judith Armenta PA-C     • simethicone  80 mg Oral 4x Daily PRN Ana María Penget, DO     • sodium chloride  100 mL/hr Intravenous Continuous Chandu Buck  mL/hr (03/06/23 1425)       Today, Patient Was Seen By: Chandu Buck DO    ** Please Note: Dictation voice to text software may have been used in the creation of this document   **

## 2023-03-07 NOTE — ASSESSMENT & PLAN NOTE
· Invasive carcinoma of bladder known to urology service  · No evidence of bleeding this admission  · Following up with [facility] later this week

## 2023-03-07 NOTE — ASSESSMENT & PLAN NOTE
· Sepsis on admission secondary to VRE based on outside urine culture on 2/27/2023  · Case was discussed during hospitalization with ID  · Continue linezolid for 7-day course  · Blood cultures negative

## 2023-03-07 NOTE — ASSESSMENT & PLAN NOTE
· Nonspecific hyponatremia may be secondary to poor intake  · Give IV fluids overnight and recheck labs in a m      Results from last 7 days   Lab Units 03/06/23  1549 03/06/23  0524 03/05/23  0620 03/04/23  0617   SODIUM mmol/L 132* 132* 133* 134*

## 2023-03-07 NOTE — PLAN OF CARE
Problem: Potential for Falls  Goal: Patient will remain free of falls  Description: INTERVENTIONS:  - Educate patient/family on patient safety including physical limitations  - Instruct patient to call for assistance with activity   - Consult OT/PT to assist with strengthening/mobility   - Keep Call bell within reach  - Keep bed low and locked with side rails adjusted as appropriate  - Keep care items and personal belongings within reach  - Initiate and maintain comfort rounds  - Make Fall Risk Sign visible to staff  - Apply yellow socks and bracelet for high fall risk patients  - Consider moving patient to room near nurses station  Outcome: Progressing     Problem: Nutrition/Hydration-ADULT  Goal: Nutrient/Hydration intake appropriate for improving, restoring or maintaining nutritional needs  Description: Monitor and assess patient's nutrition/hydration status for malnutrition  Collaborate with interdisciplinary team and initiate plan and interventions as ordered  Monitor patient's weight and dietary intake as ordered or per policy  Utilize nutrition screening tool and intervene as necessary  Determine patient's food preferences and provide high-protein, high-caloric foods as appropriate       INTERVENTIONS:  - Monitor oral intake, urinary output, labs, and treatment plans  - Assess nutrition and hydration status and recommend course of action  - Evaluate amount of meals eaten  - Assist patient with eating if necessary   - Allow adequate time for meals  - Recommend/ encourage appropriate diets, oral nutritional supplements, and vitamin/mineral supplements  - Order, calculate, and assess calorie counts as needed  - Recommend, monitor, and adjust tube feedings and TPN/PPN based on assessed needs  - Assess need for intravenous fluids  - Provide specific nutrition/hydration education as appropriate  - Include patient/family/caregiver in decisions related to nutrition  Outcome: Progressing     Problem: MOBILITY - ADULT  Goal: Maintain or return to baseline ADL function  Description: INTERVENTIONS:  - Educate patient/family on patient safety including physical limitations  - Instruct patient to call for assistance with activity   - Consult OT/PT to assist with strengthening/mobility   - Keep Call bell within reach  - Keep bed low and locked with side rails adjusted as appropriate  - Keep care items and personal belongings within reach  - Initiate and maintain comfort rounds  - Make Fall Risk Sign visible to staff  - Apply yellow socks and bracelet for high fall risk patients  - Consider moving patient to room near nurses station  Outcome: Progressing  Goal: Maintains/Returns to pre admission functional level  Description: INTERVENTIONS:  - Perform BMAT or MOVE assessment daily    - Set and communicate daily mobility goal to care team and patient/family/caregiver     - Collaborate with rehabilitation services on mobility goals if consulted  - Out of bed for toileting  - Record patient progress and toleration of activity level   Outcome: Progressing     Problem: Prexisting or High Potential for Compromised Skin Integrity  Goal: Skin integrity is maintained or improved  Description: INTERVENTIONS:  - Identify patients at risk for skin breakdown  - Assess and monitor skin integrity  - Assess and monitor nutrition and hydration status  - Monitor labs   - Assess for incontinence   - Turn and reposition patient  - Assist with mobility/ambulation  - Relieve pressure over bony prominences  - Avoid friction and shearing  - Provide appropriate hygiene as needed including keeping skin clean and dry  - Evaluate need for skin moisturizer/barrier cream  - Collaborate with interdisciplinary team   - Patient/family teaching  - Consider wound care consult   Outcome: Progressing     Problem: GENITOURINARY - ADULT  Goal: Maintains or returns to baseline urinary function  Description: INTERVENTIONS:  - Assess urinary function  - Encourage oral fluids to ensure adequate hydration if ordered  - Administer IV fluids as ordered to ensure adequate hydration  - Administer ordered medications as needed  - Offer frequent toileting  - Follow urinary retention protocol if ordered  Outcome: Progressing     Problem: METABOLIC, FLUID AND ELECTROLYTES - ADULT  Goal: Electrolytes maintained within normal limits  Description: INTERVENTIONS:  - Monitor labs and assess patient for signs and symptoms of electrolyte imbalances  - Administer electrolyte replacement as ordered  - Monitor response to electrolyte replacements, including repeat lab results as appropriate  - Instruct patient on fluid and nutrition as appropriate  Outcome: Progressing  Goal: Fluid balance maintained  Description: INTERVENTIONS:  - Monitor labs   - Monitor I/O and WT  - Instruct patient on fluid and nutrition as appropriate  - Assess for signs & symptoms of volume excess or deficit  Outcome: Progressing  Goal: Glucose maintained within target range  Description: INTERVENTIONS:  - Monitor Blood Glucose as ordered  - Assess for signs and symptoms of hyperglycemia and hypoglycemia  - Administer ordered medications to maintain glucose within target range  - Assess nutritional intake and initiate nutrition service referral as needed  Outcome: Progressing     Problem: SKIN/TISSUE INTEGRITY - ADULT  Goal: Skin Integrity remains intact(Skin Breakdown Prevention)  Description: Assess:  -Perform Ortega assessment every shift and prn  -Inspect skin when repositioning, toileting, and assisting with ADLS  -Assess extremities for adequate circulation and sensation     Bed Management:  -Have minimal linens on bed & keep smooth, unwrinkled  -Change linens as needed when moist or perspiring      Activity:  -Encourage activity and walks on unit  -Encourage or provide ROM exercises  -Use appropriate equipment to lift or move patient in bed    Skin Care:  -Avoid use of baby powder, tape, friction and shearing, hot water or constrictive clothing  -Do not massage red bony areas    Outcome: Progressing  Goal: Pressure injury heals and does not worsen  Description: Interventions:  - Implement low air loss mattress or specialty surface (Criteria met)  - Apply silicone foam dressing  - Apply fecal or urinary incontinence containment device   - Utilize friction reducing device or surface for transfers   - Consider nutrition services referral as needed  Outcome: Progressing     Problem: PAIN - ADULT  Goal: Verbalizes/displays adequate comfort level or baseline comfort level  Description: Interventions:  - Encourage patient to monitor pain and request assistance  - Assess pain using appropriate pain scale  - Administer analgesics based on type and severity of pain and evaluate response  - Implement non-pharmacological measures as appropriate and evaluate response  - Consider cultural and social influences on pain and pain management  - Notify physician/advanced practitioner if interventions unsuccessful or patient reports new pain  Outcome: Progressing     Problem: INFECTION - ADULT  Goal: Absence or prevention of progression during hospitalization  Description: INTERVENTIONS:  - Assess and monitor for signs and symptoms of infection  - Monitor lab/diagnostic results  - Monitor all insertion sites, i e  indwelling lines, tubes, and drains  - Monitor endotracheal if appropriate and nasal secretions for changes in amount and color  - Cashion appropriate cooling/warming therapies per order  - Administer medications as ordered  - Instruct and encourage patient and family to use good hand hygiene technique  - Identify and instruct in appropriate isolation precautions for identified infection/condition  Outcome: Progressing     Problem: SAFETY ADULT  Goal: Patient will remain free of falls  Description: INTERVENTIONS:  - Educate patient/family on patient safety including physical limitations  - Instruct patient to call for assistance with activity   - Consult OT/PT to assist with strengthening/mobility   - Keep Call bell within reach  - Keep bed low and locked with side rails adjusted as appropriate  - Keep care items and personal belongings within reach  - Initiate and maintain comfort rounds  - Make Fall Risk Sign visible to staff  - Apply yellow socks and bracelet for high fall risk patients  - Consider moving patient to room near nurses station  Outcome: Progressing  Goal: Maintain or return to baseline ADL function  Description: INTERVENTIONS:  - Educate patient/family on patient safety including physical limitations  - Instruct patient to call for assistance with activity   - Consult OT/PT to assist with strengthening/mobility   - Keep Call bell within reach  - Keep bed low and locked with side rails adjusted as appropriate  - Keep care items and personal belongings within reach  - Initiate and maintain comfort rounds  - Make Fall Risk Sign visible to staff  - Apply yellow socks and bracelet for high fall risk patients  - Consider moving patient to room near nurses station  Outcome: Progressing  Goal: Maintains/Returns to pre admission functional level  Description: INTERVENTIONS:  - Perform BMAT or MOVE assessment daily    - Set and communicate daily mobility goal to care team and patient/family/caregiver     - Collaborate with rehabilitation services on mobility goals if consulted  - Out of bed to chair 3 times a day   - Out of bed for meals 3 times a day  - Out of bed for toileting  - Record patient progress and toleration of activity level   Outcome: Progressing

## 2023-03-07 NOTE — ASSESSMENT & PLAN NOTE
· Sepsis on admission secondary to VRE based on outside urine culture on 2/27/2023  · Case was discussed during hospitalization with ID  · Continue linezolid currently day 5 of 7  · Blood cultures negative

## 2023-03-07 NOTE — ASSESSMENT & PLAN NOTE
· Nonspecific hyponatremia likely secondary to poor intake  · Defer further IV fluids and encourage intake    Results from last 7 days   Lab Units 03/07/23  0521 03/06/23  1549 03/06/23  0524 03/05/23  0620   SODIUM mmol/L 133* 132* 132* 133*

## 2023-03-07 NOTE — ASSESSMENT & PLAN NOTE
· Stable at baseline    Results from last 7 days   Lab Units 03/07/23  0521 03/06/23  0524 03/05/23  0620 03/04/23  0617 03/03/23  1754   BUN mg/dL 47* 46* 47* 48* 50*   CREATININE mg/dL 2 49* 2 37* 2 40* 2 39* 2 50*   EGFR ml/min/1 73sq m 23 24 24 24 23

## 2023-03-08 LAB
ANION GAP SERPL CALCULATED.3IONS-SCNC: 8 MMOL/L (ref 4–13)
BACTERIA BLD CULT: NORMAL
BACTERIA BLD CULT: NORMAL
BUN SERPL-MCNC: 45 MG/DL (ref 5–25)
CALCIUM SERPL-MCNC: 10.7 MG/DL (ref 8.4–10.2)
CHLORIDE SERPL-SCNC: 100 MMOL/L (ref 96–108)
CO2 SERPL-SCNC: 24 MMOL/L (ref 21–32)
CORTIS AM PEAK SERPL-MCNC: 17.4 UG/DL (ref 4.2–22.4)
CREAT SERPL-MCNC: 2.47 MG/DL (ref 0.6–1.3)
ERYTHROCYTE [DISTWIDTH] IN BLOOD BY AUTOMATED COUNT: 16.2 % (ref 11.6–15.1)
GFR SERPL CREATININE-BSD FRML MDRD: 23 ML/MIN/1.73SQ M
GLUCOSE SERPL-MCNC: 115 MG/DL (ref 65–140)
GLUCOSE SERPL-MCNC: 179 MG/DL (ref 65–140)
GLUCOSE SERPL-MCNC: 68 MG/DL (ref 65–140)
GLUCOSE SERPL-MCNC: 78 MG/DL (ref 65–140)
GLUCOSE SERPL-MCNC: 86 MG/DL (ref 65–140)
GLUCOSE SERPL-MCNC: 92 MG/DL (ref 65–140)
HCT VFR BLD AUTO: 29.3 % (ref 36.5–49.3)
HGB BLD-MCNC: 9.4 G/DL (ref 12–17)
MCH RBC QN AUTO: 28.9 PG (ref 26.8–34.3)
MCHC RBC AUTO-ENTMCNC: 32.1 G/DL (ref 31.4–37.4)
MCV RBC AUTO: 90 FL (ref 82–98)
OSMOLALITY UR/SERPL-RTO: 294 MMOL/KG (ref 282–298)
OSMOLALITY UR: 369 MMOL/KG
PLATELET # BLD AUTO: 298 THOUSANDS/UL (ref 149–390)
PMV BLD AUTO: 10.4 FL (ref 8.9–12.7)
POTASSIUM SERPL-SCNC: 5.1 MMOL/L (ref 3.5–5.3)
RBC # BLD AUTO: 3.25 MILLION/UL (ref 3.88–5.62)
SODIUM 24H UR-SCNC: 60 MOL/L
SODIUM SERPL-SCNC: 132 MMOL/L (ref 135–147)
TSH SERPL DL<=0.05 MIU/L-ACNC: 1.93 UIU/ML (ref 0.45–4.5)
URATE SERPL-MCNC: 9.1 MG/DL (ref 3.5–8.5)
WBC # BLD AUTO: 19.25 THOUSAND/UL (ref 4.31–10.16)

## 2023-03-08 RX ORDER — SODIUM CHLORIDE 9 MG/ML
50 INJECTION, SOLUTION INTRAVENOUS CONTINUOUS
Status: DISPENSED | OUTPATIENT
Start: 2023-03-08 | End: 2023-03-08

## 2023-03-08 RX ADMIN — OXYCODONE HYDROCHLORIDE 5 MG: 5 TABLET ORAL at 17:31

## 2023-03-08 RX ADMIN — OXYCODONE HYDROCHLORIDE 5 MG: 5 TABLET ORAL at 23:08

## 2023-03-08 RX ADMIN — ASPIRIN 81 MG: 81 TABLET, COATED ORAL at 08:43

## 2023-03-08 RX ADMIN — HEPARIN SODIUM 5000 UNITS: 5000 INJECTION INTRAVENOUS; SUBCUTANEOUS at 06:54

## 2023-03-08 RX ADMIN — ONDANSETRON 4 MG: 4 TABLET, ORALLY DISINTEGRATING ORAL at 12:23

## 2023-03-08 RX ADMIN — LINEZOLID 600 MG: 600 TABLET, FILM COATED ORAL at 06:54

## 2023-03-08 RX ADMIN — ONDANSETRON 4 MG: 4 TABLET, ORALLY DISINTEGRATING ORAL at 08:43

## 2023-03-08 RX ADMIN — EZETIMIBE 10 MG: 10 TABLET ORAL at 08:43

## 2023-03-08 RX ADMIN — HEPARIN SODIUM 5000 UNITS: 5000 INJECTION INTRAVENOUS; SUBCUTANEOUS at 21:16

## 2023-03-08 RX ADMIN — DOCUSATE SODIUM 100 MG: 100 CAPSULE, LIQUID FILLED ORAL at 08:43

## 2023-03-08 RX ADMIN — OXYBUTYNIN CHLORIDE 5 MG: 5 TABLET ORAL at 21:17

## 2023-03-08 RX ADMIN — SODIUM CHLORIDE 50 ML/HR: 0.9 INJECTION, SOLUTION INTRAVENOUS at 12:23

## 2023-03-08 RX ADMIN — LINEZOLID 600 MG: 600 TABLET, FILM COATED ORAL at 17:34

## 2023-03-08 RX ADMIN — ONDANSETRON 4 MG: 4 TABLET, ORALLY DISINTEGRATING ORAL at 17:31

## 2023-03-08 RX ADMIN — HEPARIN SODIUM 5000 UNITS: 5000 INJECTION INTRAVENOUS; SUBCUTANEOUS at 15:16

## 2023-03-08 RX ADMIN — GABAPENTIN 300 MG: 300 CAPSULE ORAL at 21:17

## 2023-03-08 RX ADMIN — METOPROLOL TARTRATE 25 MG: 25 TABLET, FILM COATED ORAL at 08:43

## 2023-03-08 RX ADMIN — OXYBUTYNIN CHLORIDE 5 MG: 5 TABLET ORAL at 17:33

## 2023-03-08 RX ADMIN — ACETAMINOPHEN 325MG 975 MG: 325 TABLET ORAL at 10:03

## 2023-03-08 RX ADMIN — OXYCODONE HYDROCHLORIDE 5 MG: 5 TABLET ORAL at 03:13

## 2023-03-08 RX ADMIN — ONDANSETRON 4 MG: 4 TABLET, ORALLY DISINTEGRATING ORAL at 21:17

## 2023-03-08 RX ADMIN — OXYBUTYNIN CHLORIDE 5 MG: 5 TABLET ORAL at 08:43

## 2023-03-08 RX ADMIN — BIMATOPROST 1 DROP: 0.1 SOLUTION/ DROPS OPHTHALMIC at 21:17

## 2023-03-08 RX ADMIN — ARIPIPRAZOLE 2 MG: 2 TABLET ORAL at 08:43

## 2023-03-08 RX ADMIN — DOCUSATE SODIUM 100 MG: 100 CAPSULE, LIQUID FILLED ORAL at 17:31

## 2023-03-08 RX ADMIN — OXYCODONE HYDROCHLORIDE 5 MG: 5 TABLET ORAL at 07:13

## 2023-03-08 RX ADMIN — METOPROLOL TARTRATE 25 MG: 25 TABLET, FILM COATED ORAL at 21:17

## 2023-03-08 RX ADMIN — PANTOPRAZOLE SODIUM 40 MG: 40 TABLET, DELAYED RELEASE ORAL at 06:55

## 2023-03-08 NOTE — ASSESSMENT & PLAN NOTE
· Sepsis on admission secondary to VRE based on outside urine culture on 2/27/2023  · Currently on linezolid currently day 6 of 7  · Blood cultures negative  · However having worsening leukocytosis may be all tumor burden/malignancy related  Does have history of pleural effusion    Will consult ID and pulmonology for recommendations    Results from last 7 days   Lab Units 03/08/23  0444 03/06/23  0524 03/05/23  0620 03/04/23  0617 03/03/23  1534   WBC Thousand/uL 19 25* 15 69* 15 56* 15 79* 14 54*

## 2023-03-08 NOTE — ASSESSMENT & PLAN NOTE
· Stable continue abilify   · Duloxetine discontinued due to concerns for interaction with linezolid

## 2023-03-08 NOTE — ASSESSMENT & PLAN NOTE
· Chronic anemia without blood loss    · Hemoglobin stable    Results from last 7 days   Lab Units 03/08/23  0444 03/06/23  0524 03/05/23  0620 03/04/23  0617   HEMOGLOBIN g/dL 9 4* 8 4* 8 3* 8 5*

## 2023-03-08 NOTE — CONSULTS
Consultation - Nephrology   Zay Castro [de-identified] y o  male MRN: [id number]  Unit/Bed#: E5 -01 Encounter: [id number]    ASSESSMENT and PLAN:  #1 hyponatremia noted serum sodium remains in the 132-133 range for the last 5 days, on exam patient is somnolent and looks mildly dry, diuretics on currently on hold, noted he received some IV fluids over the last few days without significant improvement  Plan to check a serum osmolarity, urine osmolality, urine sodium  Plan to give 500 cc normal saline over 10 hours  Holding diuretic for now  Noted patient was previously on Cymbalta is associated with SIADH/hyponatremia but that was discontinued on 3/3 due to potential interaction with linezolid  Follow daily labs  2 chronic kidney disease stage IV, follows with [facility] Maria Victoria [facility], baseline creatinine in the mid to high twos    3 sepsis secondary to VRE UTI, currently on linezolid day 6 of 7    #4 history of recurrent high risk bladder cancer status post multiple resections and intravesical therapies, bilateral hydronephrosis with bilateral PCN  Patient follows with [facility] Zep[facility] as an outpatient  Urology was consulted this admission    #5 history of coronary disease status post PCI, diuretics are currently on hold, patient looks near euvolemic to dry on exam      SUMMARY OF RECOMMENDATIONS:  Keep holding diuretics  Normal saline 500 cc over 10 hours  Check urine osmolarity, serial osmolarity, urine sodium  Follow daily labs  Avoid relative hypotension  Plan and recommendation were discussed with internal medicine attending, he agreed with the plan    HISTORY OF PRESENT ILLNESS:  Requesting Physician: Broderick Bustos DO  Reason for Consult: Hyponatremia , CKD stage IV    Zay Castro is a [de-identified] y o  male who was admitted to [address] [place] [address] after presenting with left flank pain, concern for UTI on 3/3   A renal consultation is requested today for assistance in the management of hyponatremia  Patient with history of chronic kidney disease stage IV [facility], history of bladder cancer, bilateral hydronephrosis status post bilateral PCN, follows with [facility] [place] was admitted due to left flank pain, outside culture shows ESBL UTI, patient is currently on linezolid day 6 out of 7, due to persistent hyponatremia nephrology was consulted today  Noted patient was previously on diuretics are currently on hold, he received IV fluids over the last few days, he was also previously on Cymbalta that was discontinued due to interaction with linezolid  During my evaluation patient lying in bed, somnolent but arousable, denies significant complaints other than poor appetite and feeling tired, denies any chest pain, no shortness of breath, denies any recent nausea, no vomiting, no abdominal pain, no diarrhea      PAST MEDICAL HISTORY:  Past Medical History:   Diagnosis Date   • Anemia     Last assessed: 9/28/17   • Anxiety    • Arteriosclerotic cardiovascular disease     Last assessed: 9/28/17   • Arthritis    • Bladder cancer ([facility] )     bladder- had BCG treatments   • Chronic kidney disease     Stage IV   • CKD (chronic kidney disease) stage 4, GFR 15-29 ml/min ([facility])    • Colon polyp    • Coronary artery disease     7 stents   • Depression    • Diabetes mellitus ([facility])     IDDM   • GERD (gastroesophageal reflux disease)    • Glaucoma    • Hematuria    • History of fusion of cervical spine    • Hyperlipidemia    • Hypertension    • Insomnia     Last assessed: 11/14/12   • Loss of hearing     has hearing aids but usually does not wear them   • Lung cancer ([facility] )    • Metastatic cancer ([facility] )    • Other seasonal allergic rhinitis     Last assessed: 2/10/16   • PAD (peripheral artery disease) ([facility])    • Shortness of breath     on exertion   • Spinal stenosis of lumbar region    • Transient cerebral ischemia     No Residual   • Uses walker     w/c for longer distances       PAST SURGICAL HISTORY:  Past Surgical History:   Procedure Laterality Date   • CARDIAC SURGERY      Cath stent placement  Last assessed: 3/9/17  Interventional Catheterization   • CHOLECYSTECTOMY     • COLONOSCOPY     • CYSTOSCOPY      Diagnostic w/biopsy  Louis Jenkins  Last assessed: 12/1/14   • CYSTOSCOPY N/A 04/12/2022    Procedure: CYSTOSCOPY  Bladder biopsies  ;  Surgeon: Lesia Samson MD;  [facility];  Service: Urology   • CYSTOSCOPY W/ RETROGRADES Right 03/01/2022    Procedure: CYSTO; stent removal retrograde;  Surgeon: Lesia Samson MD;  [facility];  Service: Urology   • CYSTOSCOPY W/ URETERAL STENT PLACEMENT Bilateral 10/18/2021    Procedure: bilateral retrogrades, cytology collection;  Surgeon: Lesia Samson MD;  [facility];  Service: Urology   • CYSTOURETHROSCOPY      w/cautery  Louis Jenkins   • FL RETROGRADE PYELOGRAM  10/18/2021   • FL RETROGRADE PYELOGRAM  10/24/2021   • FL RETROGRADE PYELOGRAM  12/14/2022   • GASTRIC BYPASS      For morbid obesity w/Shaji-en-Y   Resolved: 11/17/09   • INCISION AND DRAINAGE OF WOUND Right 02/26/2017    Procedure: INCISION AND DRAINAGE (I&D) EXTREMITY WITH APPLICATION OF GRAFT JACKET;  Surgeon: Valerie Templeton DPM;  [facility];  Service:    • INCISION AND DRAINAGE OF WOUND Right 04/25/2017    Procedure: INCISION AND DRAINAGE (I&D) EXTREMITY, APPLICATION OF GRAFT;  Surgeon: Valerie Templeton DPM;  [facility];  Service:    • IR BIOPSY OTHER  07/02/2020   • IR LOWER EXTREMITY ANGIOGRAM  02/08/2021   • IR LOWER EXTREMITY ANGIOGRAM  02/11/2021   • IR NEPHROSTOMY TUBE CHECK/CHANGE/REPOSITION/REINSERTION/UPSIZE  04/28/2022   • IR NEPHROSTOMY TUBE CHECK/CHANGE/REPOSITION/REINSERTION/UPSIZE  05/24/2022   • IR NEPHROSTOMY TUBE CHECK/CHANGE/REPOSITION/REINSERTION/UPSIZE  06/07/2022   • IR NEPHROSTOMY TUBE CHECK/CHANGE/REPOSITION/REINSERTION/UPSIZE  07/28/2022   • IR NEPHROSTOMY TUBE CHECK/CHANGE/REPOSITION/REINSERTION/UPSIZE  11/15/2022   • IR NEPHROSTOMY TUBE CHECK/CHANGE/REPOSITION/REINSERTION/UPSIZE  1/10/2023   • IR NEPHROSTOMY TUBE PLACEMENT  02/25/2022   • IR PORT PLACEMENT  01/17/2022   • IR PORT REMOVAL  1/10/2023   • IR THORACENTESIS  09/02/2022   • IR THORACENTESIS  09/14/2022   • IR THORACENTESIS WITH TUBE PLACEMENT Left     october   • IR TUNNELED CENTRAL LINE PLACEMENT  12/24/2020   • JOINT REPLACEMENT      christofer knees replaced   • VA AMPUTATION METATARSAL W/TOE SINGLE Left 12/21/2020    Procedure: RAY RESECTION FOOT;  Surgeon: Monroe Palacios DPM;  [facility];  Service: Podiatry   • VA AMPUTATION METATARSAL W/TOE SINGLE Left 12/31/2020    Procedure: 5TH MET RESECTION;  Surgeon: Monroe Palacios DPM;  [facility];  Service: Podiatry   • VA CYSTO BLADDER W/URETERAL CATHETERIZATION Right 12/14/2022    Procedure: CYSTOSCOPY WITH RETROGRADE PYELOGRAM and CLOT EVACUATION, RIGHT STENT INSERTION, FULGRATION OF BLEEDING POINTS;  Surgeon: Ken Kirk MD;  [facility];  Service: Urology   • VA CYSTO W/IRRIG & EVAC MULTPLE OBSTRUCTING CLOTS N/A 02/10/2021    Procedure: CYSTOSCOPY EVACUATION OF CLOTS, fulguration;  Surgeon: Storm Bautista MD;  [facility];  Service: Urology   • VA CYSTO W/IRRIG & EVAC MULTPLE OBSTRUCTING CLOTS N/A 10/24/2021    Procedure: CYSTOSCOPY EVACUATION OF CLOT, fulguration of bleeding vessels, right ureter stent placement, retrograde pyelogram;  Surgeon: Lawyer Tamie MD;  [facility];  Service: Urology   • VA CYSTO W/REMOVAL OF LESIONS SMALL N/A 11/19/2020    Procedure: CYSTO W/BIOPSIES, transurethral prostate bx;  Surgeon: Anuja Winston MD;  [facility];  Service: Urology   • VA CYSTOURETHROSCOPY W/DEST &/RMVL TUMOR LARGE Bilateral 10/18/2021    Procedure: TRANSURETHRAL RESECTION OF BLADDER TUMOR (TURBT);   Surgeon: Glenis Bustos MD;  [facility];  Service: Urology   • VA CYSTOURETHROSCOPY WITH BIOPSY N/A 2016    Procedure: CYSTOSCOPY WITH BIOPSIES;  Surgeon: Laureen Dimas MD;  [facility];  Service: Urology   • HI Lendeneene Medal 3RD+ ORD SLCTV ABDL PEL/LXTR [facility] Left 2021    Procedure: LEG angiogram, CO2 w/limited contrast with balloon angioplasty postertior tibial artery;  Surgeon: Moisés Hernandez MD;  [facility];  Service: Vascular   • ROTATOR CUFF REPAIR     • SMALL INTESTINE SURGERY      Surgery Shaji-en-Y   • SPINAL FUSION      lumbar and cervical fusions   • VAC DRESSING APPLICATION Right     Procedure: APPLICATION VAC DRESSING;  Surgeon: Gera Domínguez DPM;  [facility];  Service:    • WOUND DEBRIDEMENT Left 2021    Procedure: FOOT DEBRIDE, [address] OUT w/graft application;  Surgeon: Gera Domínguez DPM;  [facility];  Service: Podiatry       SOCIAL HISTORY:  Social History     Substance and Sexual Activity   Alcohol Use Not Currently    Comment: occasional in the past     Social History     Substance and Sexual Activity   Drug Use Not Currently   • Types: Marijuana    Comment: quit 2019 had medical marijuana     Social History     Tobacco Use   Smoking Status Former   • Packs/day: 3 00   • Types: Cigarettes   • Start date:    • Quit date:    • Years since quittin 2   • Passive exposure: Past   Smokeless Tobacco Never       FAMILY HISTORY:  Family History   Problem Relation Age of Onset   • Diabetes Mother    • Heart disease Mother    • Other Mother         High blood pressure   • Heart disease Father    • Diabetes Sister    • Other Sister         High blood pressure   • Kidney disease Sister    • Heart disease Brother    • Other Brother         High blood pressure       ALLERGIES:  Allergies   Allergen Reactions   • Statins Hives and Itching     Includes atorvastatin and simvastatin     • Insulin Lispro Swelling and Edema     " Lower Legs"   • Other Itching, Rash and Other (See Comments)     "EKG Patches"   "blue EKG patches"       MEDICATIONS:    Current Facility-Administered Medications:   •  acetaminophen (TYLENOL) tablet 975 mg, 975 mg, Oral, Q8H PRN, Avon Goltz, PA-C, 975 mg at 03/08/23 1003  •  aluminum-magnesium hydroxide-simethicone (MYLANTA) oral suspension 30 mL, 30 mL, Oral, Q6H PRN, Yayo Brown DO  •  ARIPiprazole (ABILIFY) tablet 2 mg, 2 mg, Oral, Daily, Piyush Hu, DO, 2 mg at 03/08/23 1441  •  aspirin (ECOTRIN LOW STRENGTH) EC tablet 81 mg, 81 mg, Oral, Daily, Yayo Brown DO, 81 mg at 03/08/23 0843  •  bimatoprost (LUMIGAN) 0 01 % ophthalmic solution 1 drop, 1 drop, Both Eyes, HS, Piyush Hu, DO, 1 drop at 03/07/23 2210  •  docusate sodium (COLACE) capsule 100 mg, 100 mg, Oral, BID, Yayo Brown DO, 100 mg at 03/08/23 5730  •  ezetimibe (ZETIA) tablet 10 mg, 10 mg, Oral, Daily, Yayo Brown DO, 10 mg at 03/08/23 4838  •  gabapentin (NEURONTIN) capsule 300 mg, 300 mg, Oral, HS, Piyush Hays Shape, DO, 300 mg at 03/07/23 2209  •  heparin (porcine) subcutaneous injection 5,000 Units, 5,000 Units, Subcutaneous, Q8H [facility], 5,000 Units at 03/08/23 0654 **AND** [CANCELED] Platelet count, , , Once, Yayo Brown DO  •  insulin glargine (LANTUS) subcutaneous injection 10 Units 0 1 mL, 10 Units, Subcutaneous, HS, Piyush Hays Shape, DO, 10 Units at 03/07/23 2210  •  lidocaine (LIDODERM) 5 % patch 1 patch, 1 patch, Topical, Daily, Perico Diaz PA-C, 1 patch at 03/07/23 1095  •  linezolid (ZYVOX) tablet 600 mg, 600 mg, Oral, Q12H, Yayo Brown DO, 600 mg at 03/08/23 2241  •  metoprolol tartrate (LOPRESSOR) tablet 25 mg, 25 mg, Oral, Q12H [facility], Piyush Hays Mayte DO, 25 mg at 03/08/23 8087  •  ondansetron (ZOFRAN-ODT) dispersible tablet 4 mg, 4 mg, Oral, 4x Daily, Fabiano Villanueva DO, 4 mg at 03/08/23 2064  •  oxybutynin (DITROPAN) tablet 5 mg, 5 mg, Oral, TID, Yayo Brown DO, 5 mg at 03/08/23 7879  •  oxyCODONE (ROXICODONE) IR tablet 5 mg, 5 mg, Oral, Q4H PRN, Ashley Salinas PA-C, 5 mg at 03/08/23 9619  •  pantoprazole (PROTONIX) EC tablet 40 mg, 40 mg, Oral, Daily Before Breakfast, Piyush Pulido DO, 40 mg at 03/08/23 3143  •  polyethylene glycol (MIRALAX) packet 17 g, 17 g, Oral, BID, Perico Diaz PA-C, 17 g at 03/06/23 8053  •  simethicone (MYLICON) chewable tablet 80 mg, 80 mg, Oral, 4x Daily PRN, Tayler DO  •  sodium chloride 0 9 % infusion, 50 mL/hr, Intravenous, Continuous, Shiloh Santos MD    REVIEW OF SYSTEMS:  All the systems were reviewed and were negative except as documented on the HPI      PHYSICAL EXAM:  Current Weight: Weight - Scale: 85 3 kg (188 lb 0 8 oz)  First Weight: Weight - Scale: 89 5 kg (197 lb 5 oz)  Vitals:    03/07/23 2229 03/08/23 0600 03/08/23 0714 03/08/23 1108   BP: 138/93  115/78 130/78   BP Location: Left arm  Right arm Right arm   Pulse: 97  (!) 118 79   Resp: 18  17 17   Temp: 98 2 °F (36 8 °C)  98 1 °F (36 7 °C) 98 °F (36 7 °C)   TempSrc: Oral  Oral Oral   SpO2: (!) 87%  97% 98%   Weight:  85 3 kg (188 lb 0 8 oz)     Height:           Intake/Output Summary (Last 24 hours) at 3/8/2023 1153  Last data filed at 3/8/2023 0801  Gross per 24 hour   Intake 120 ml   Output 1500 ml   Net -1380 ml       General: conscious, cooperative, in not acute distress  Eyes: conjunctivae pink, anicteric sclerae  ENT: lips and mucous membranes dry  Neck: supple, no JVD  Chest: clear breath sounds bilateral, no crackles, ronchus or wheezings  CVS: distinct S1 & S2, normal rate, regular rhythm  Abdomen: soft, non-tender, non-distended, normoactive bowel sounds  Back: no CVA tenderness  Extremities: no significant edema of both legs  Skin: no rash  Neuro: Somnolent but arousable, interactive        Invasive Devices:        Lab Results:   Results from last 7 days   Lab Units 03/08/23  0444 03/07/23  0521 03/06/23  1549 03/06/23  0524 03/05/23  0620   WBC Thousand/uL 19 25*  --   --  15 69* 15 56*   HEMOGLOBIN g/dL 9 4*  --   --  8 4* 8 3*   HEMATOCRIT % 29 3* --   --  26 1* 26 1*   PLATELETS Thousands/uL 298  --   --  237 247   SODIUM mmol/L 132* 133* 132* 132* 133*   POTASSIUM mmol/L 5 1 5 2  --  5 1 4 7   CHLORIDE mmol/L 100 99  --  102 101   CO2 mmol/L 24 25  --  22 25   BUN mg/dL 45* 47*  --  46* 47*   CREATININE mg/dL 2 47* 2 49*  --  2 37* 2 40*   CALCIUM mg/dL 10 7* 10 3*  --  10 0 10 3*       Other Studies:   Uric acid 9 1      Portions of the record may have been created with voice recognition software  Occasional wrong word or "sound a like" substitutions may have occurred due to the inherent limitations of voice recognition software  Read the chart carefully and recognize, using context, where substitutions have occurred  If you have any questions, please contact the dictating provider

## 2023-03-08 NOTE — ASSESSMENT & PLAN NOTE
Malnutrition Findings:   Adult Malnutrition type: Chronic illness  Adult Degree of Malnutrition: Malnutrition of moderate degree  Malnutrition Characteristics: Inadequate energy, Weight loss, Fluid accumulation  360 Statement: PCM r/t cancer with chemoradiation AEB, a 6 6% unintended wt loss from 01/02/23 to present and a 12 8% unintended wt loss from 08/26/22 to present and < 75% energy intake compared to estimated energy needs for > 1 month  BMI Findings: Body mass index is 22 89 kg/m²

## 2023-03-08 NOTE — PLAN OF CARE
Problem: Potential for Falls  Goal: Patient will remain free of falls  Description: INTERVENTIONS:  - Educate patient/family on patient safety including physical limitations  - Instruct patient to call for assistance with activity   - Consult OT/PT to assist with strengthening/mobility   - Keep Call bell within reach  - Keep bed low and locked with side rails adjusted as appropriate  - Keep care items and personal belongings within reach  - Initiate and maintain comfort rounds  - Make Fall Risk Sign visible to staff  - Apply yellow socks and bracelet for high fall risk patients  - Consider moving patient to room near nurses station  Outcome: Progressing     Problem: Nutrition/Hydration-ADULT  Goal: Nutrient/Hydration intake appropriate for improving, restoring or maintaining nutritional needs  Description: Monitor and assess patient's nutrition/hydration status for malnutrition  Collaborate with interdisciplinary team and initiate plan and interventions as ordered  Monitor patient's weight and dietary intake as ordered or per policy  Utilize nutrition screening tool and intervene as necessary  Determine patient's food preferences and provide high-protein, high-caloric foods as appropriate       INTERVENTIONS:  - Monitor oral intake, urinary output, labs, and treatment plans  - Assess nutrition and hydration status and recommend course of action  - Evaluate amount of meals eaten  - Assist patient with eating if necessary   - Allow adequate time for meals  - Recommend/ encourage appropriate diets, oral nutritional supplements, and vitamin/mineral supplements  - Order, calculate, and assess calorie counts as needed  - Recommend, monitor, and adjust tube feedings and TPN/PPN based on assessed needs  - Assess need for intravenous fluids  - Provide specific nutrition/hydration education as appropriate  - Include patient/family/caregiver in decisions related to nutrition  Outcome: Progressing     Problem: MOBILITY - ADULT  Goal: Maintain or return to baseline ADL function  Description: INTERVENTIONS:  - Educate patient/family on patient safety including physical limitations  - Instruct patient to call for assistance with activity   - Consult OT/PT to assist with strengthening/mobility   - Keep Call bell within reach  - Keep bed low and locked with side rails adjusted as appropriate  - Keep care items and personal belongings within reach  - Initiate and maintain comfort rounds  - Make Fall Risk Sign visible to staff  - Apply yellow socks and bracelet for high fall risk patients  - Consider moving patient to room near nurses station  Outcome: Progressing  Goal: Maintains/Returns to pre admission functional level  Description: INTERVENTIONS:  - Perform BMAT or MOVE assessment daily    - Set and communicate daily mobility goal to care team and patient/family/caregiver  - Collaborate with rehabilitation services on mobility goals if consulted  - Perform Range of Motion  times a day  - Reposition patient every  hours    - Dangle patient  times a day  - Stand patient  times a day  - Ambulate patient  times a day  - Out of bed to chair  times a day   - Out of bed for meals  times a day  - Out of bed for toileting  - Record patient progress and toleration of activity level   Outcome: Progressing     Problem: Prexisting or High Potential for Compromised Skin Integrity  Goal: Skin integrity is maintained or improved  Description: INTERVENTIONS:  - Identify patients at risk for skin breakdown  - Assess and monitor skin integrity  - Assess and monitor nutrition and hydration status  - Monitor labs   - Assess for incontinence   - Turn and reposition patient  - Assist with mobility/ambulation  - Relieve pressure over bony prominences  - Avoid friction and shearing  - Provide appropriate hygiene as needed including keeping skin clean and dry  - Evaluate need for skin moisturizer/barrier cream  - Collaborate with interdisciplinary team   - Patient/family teaching  - Consider wound care consult   Outcome: Progressing     Problem: GENITOURINARY - ADULT  Goal: Maintains or returns to baseline urinary function  Description: INTERVENTIONS:  - Assess urinary function  - Encourage oral fluids to ensure adequate hydration if ordered  - Administer IV fluids as ordered to ensure adequate hydration  - Administer ordered medications as needed  - Offer frequent toileting  - Follow urinary retention protocol if ordered  Outcome: Progressing     Problem: METABOLIC, FLUID AND ELECTROLYTES - ADULT  Goal: Electrolytes maintained within normal limits  Description: INTERVENTIONS:  - Monitor labs and assess patient for signs and symptoms of electrolyte imbalances  - Administer electrolyte replacement as ordered  - Monitor response to electrolyte replacements, including repeat lab results as appropriate  - Instruct patient on fluid and nutrition as appropriate  Outcome: Progressing  Goal: Fluid balance maintained  Description: INTERVENTIONS:  - Monitor labs   - Monitor I/O and WT  - Instruct patient on fluid and nutrition as appropriate  - Assess for signs & symptoms of volume excess or deficit  Outcome: Progressing  Goal: Glucose maintained within target range  Description: INTERVENTIONS:  - Monitor Blood Glucose as ordered  - Assess for signs and symptoms of hyperglycemia and hypoglycemia  - Administer ordered medications to maintain glucose within target range  - Assess nutritional intake and initiate nutrition service referral as needed  Outcome: Progressing     Problem: SKIN/TISSUE INTEGRITY - ADULT  Goal: Skin Integrity remains intact(Skin Breakdown Prevention)  Description: Assess:  -Perform Ortega assessment every shift and prn  -Inspect skin when repositioning, toileting, and assisting with ADLS  -Assess extremities for adequate circulation and sensation     Bed Management:  -Have minimal linens on bed & keep smooth, unwrinkled  -Change linens as needed when moist or perspiring      Activity:  -Encourage activity and walks on unit  -Encourage or provide ROM exercises  -Use appropriate equipment to lift or move patient in bed    Skin Care:  -Avoid use of baby powder, tape, friction and shearing, hot water or constrictive clothing  -Do not massage red bony areas    Outcome: Progressing  Goal: Pressure injury heals and does not worsen  Description: Interventions:  - Implement low air loss mattress or specialty surface (Criteria met)  - Apply silicone foam dressing  - Apply fecal or urinary incontinence containment device   - Utilize friction reducing device or surface for transfers   - Consider nutrition services referral as needed  Outcome: Progressing     Problem: PAIN - ADULT  Goal: Verbalizes/displays adequate comfort level or baseline comfort level  Description: Interventions:  - Encourage patient to monitor pain and request assistance  - Assess pain using appropriate pain scale  - Administer analgesics based on type and severity of pain and evaluate response  - Implement non-pharmacological measures as appropriate and evaluate response  - Consider cultural and social influences on pain and pain management  - Notify physician/advanced practitioner if interventions unsuccessful or patient reports new pain  Outcome: Progressing     Problem: INFECTION - ADULT  Goal: Absence or prevention of progression during hospitalization  Description: INTERVENTIONS:  - Assess and monitor for signs and symptoms of infection  - Monitor lab/diagnostic results  - Monitor all insertion sites, i e  indwelling lines, tubes, and drains  - Monitor endotracheal if appropriate and nasal secretions for changes in amount and color  - Reading appropriate cooling/warming therapies per order  - Administer medications as ordered  - Instruct and encourage patient and family to use good hand hygiene technique  - Identify and instruct in appropriate isolation precautions for identified infection/condition  Outcome: Progressing     Problem: SAFETY ADULT  Goal: Patient will remain free of falls  Description: INTERVENTIONS:  - Educate patient/family on patient safety including physical limitations  - Instruct patient to call for assistance with activity   - Consult OT/PT to assist with strengthening/mobility   - Keep Call bell within reach  - Keep bed low and locked with side rails adjusted as appropriate  - Keep care items and personal belongings within reach  - Initiate and maintain comfort rounds  - Make Fall Risk Sign visible to staff  - Apply yellow socks and bracelet for high fall risk patients  - Consider moving patient to room near nurses station  Outcome: Progressing  Goal: Maintain or return to baseline ADL function  Description: INTERVENTIONS:  - Educate patient/family on patient safety including physical limitations  - Instruct patient to call for assistance with activity   - Consult OT/PT to assist with strengthening/mobility   - Keep Call bell within reach  - Keep bed low and locked with side rails adjusted as appropriate  - Keep care items and personal belongings within reach  - Initiate and maintain comfort rounds  - Make Fall Risk Sign visible to staff  - Apply yellow socks and bracelet for high fall risk patients  - Consider moving patient to room near nurses station  Outcome: Progressing  Goal: Maintains/Returns to pre admission functional level  Description: INTERVENTIONS:  - Perform BMAT or MOVE assessment daily    - Set and communicate daily mobility goal to care team and patient/family/caregiver  - Collaborate with rehabilitation services on mobility goals if consulted  - Perform Range of Motion  times a day  - Reposition patient every  hours    - Dangle patient  times a day  - Stand patient  times a day  - Ambulate patient  times a day  - Out of bed to chair  times a day   - Out of bed for meals times a day  - Out of bed for toileting  - Record patient progress and toleration of activity level   Outcome: Progressing

## 2023-03-08 NOTE — ASSESSMENT & PLAN NOTE
· Nonspecific hyponatremia likely secondary to poor intake  Duloxetine discontinued  · Urology consulted for assistance    Started on gentle IV fluids    Results from last 7 days   Lab Units 03/08/23  0444 03/07/23  0521 03/06/23  1549 03/06/23  0524   SODIUM mmol/L 132* 133* 132* 132*

## 2023-03-08 NOTE — ASSESSMENT & PLAN NOTE
· Stable at baseline    Results from last 7 days   Lab Units 03/08/23  0444 03/07/23  0521 03/06/23  0524 03/05/23  0620 03/04/23  0617 03/03/23  1754   BUN mg/dL 45* 47* 46* 47* 48* 50*   CREATININE mg/dL 2 47* 2 49* 2 37* 2 40* 2 39* 2 50*   EGFR ml/min/1 73sq m 23 23 24 24 24 23

## 2023-03-08 NOTE — ASSESSMENT & PLAN NOTE
Lab Results   Component Value Date    HGBA1C 6 7 (H) 03/03/2023     Recent Labs     03/08/23  0711 03/08/23  0748 03/08/23  1105 03/08/23  1605   POCGLU 68 115 86 179*     · Currently on low-dose glargine with sliding scale

## 2023-03-08 NOTE — PROCEDURES
Midline Insertion    Date/Time: 3/8/2023 10:48 AM  Performed by: Padmini Workman RN  Authorized by: Salty Mcwilliams DO     Patient location:  Bedside  Other Assisting Provider: Yes (comment) Patrick Haywood RN [employer] rep)    Consent:     Consent given by:  Patient  Universal protocol:     Procedure explained and questions answered to patient or proxy's satisfaction: yes      Relevant documents present and verified: yes      Site/side marked: yes      Immediately prior to procedure, a time out was called: yes      Patient identity confirmed:  Verbally with patient and arm band  Pre-procedure details:     Hand hygiene: Hand hygiene performed prior to insertion      Sterile barrier technique: All elements of maximal sterile technique followed      Skin preparation:  ChloraPrep    Skin preparation agent: Skin preparation agent completely dried prior to procedure    Indications:     Midline indications: no peripheral vascular access    Anesthesia (see MAR for exact dosages): Anesthesia method:  None  Procedure details:     Location:  Brachial    Laterality:  Left    Catheter size:  18 gauge    Landmarks identified: yes      Ultrasound guidance: yes      Ultrasound image availability:  Not saved    Sterile ultrasound techniques: Sterile gel and sterile probe covers were used      Number of attempts:  1    Successful placement: yes      Catheter length (cm):  10    Exposed catheter length (cm):  0    Arm circumference (cm):  27    Lot number:  [id number]  Post-procedure details:     Post-procedure:  Dressing applied and securement device placed    Assessment:  Blood return through all ports    Post-procedure complications: none      Patient tolerance of procedure:   Tolerated well, no immediate complications

## 2023-03-08 NOTE — ASSESSMENT & PLAN NOTE
· Invasive carcinoma of bladder known to urology service  · No evidence of bleeding this admission  · Has an appointment with Devon Mane tomorrow  · Most recent PET scan demonstrates advancement of metastatic disease

## 2023-03-08 NOTE — PLAN OF CARE
Problem: Potential for Falls  Goal: Patient will remain free of falls  Description: INTERVENTIONS:  - Educate patient/family on patient safety including physical limitations  - Instruct patient to call for assistance with activity   - Consult OT/PT to assist with strengthening/mobility   - Keep Call bell within reach  - Keep bed low and locked with side rails adjusted as appropriate  - Keep care items and personal belongings within reach  - Initiate and maintain comfort rounds  - Make Fall Risk Sign visible to staff  - Apply yellow socks and bracelet for high fall risk patients  - Consider moving patient to room near nurses station  Outcome: Progressing     Problem: Nutrition/Hydration-ADULT  Goal: Nutrient/Hydration intake appropriate for improving, restoring or maintaining nutritional needs  Description: Monitor and assess patient's nutrition/hydration status for malnutrition  Collaborate with interdisciplinary team and initiate plan and interventions as ordered  Monitor patient's weight and dietary intake as ordered or per policy  Utilize nutrition screening tool and intervene as necessary  Determine patient's food preferences and provide high-protein, high-caloric foods as appropriate       INTERVENTIONS:  - Monitor oral intake, urinary output, labs, and treatment plans  - Assess nutrition and hydration status and recommend course of action  - Evaluate amount of meals eaten  - Assist patient with eating if necessary   - Allow adequate time for meals  - Recommend/ encourage appropriate diets, oral nutritional supplements, and vitamin/mineral supplements  - Order, calculate, and assess calorie counts as needed  - Recommend, monitor, and adjust tube feedings and TPN/PPN based on assessed needs  - Assess need for intravenous fluids  - Provide specific nutrition/hydration education as appropriate  - Include patient/family/caregiver in decisions related to nutrition  Outcome: Progressing     Problem: MOBILITY - ADULT  Goal: Maintain or return to baseline ADL function  Description: INTERVENTIONS:  - Educate patient/family on patient safety including physical limitations  - Instruct patient to call for assistance with activity   - Consult OT/PT to assist with strengthening/mobility   - Keep Call bell within reach  - Keep bed low and locked with side rails adjusted as appropriate  - Keep care items and personal belongings within reach  - Initiate and maintain comfort rounds  - Make Fall Risk Sign visible to staff  - Apply yellow socks and bracelet for high fall risk patients  - Consider moving patient to room near nurses station  Outcome: Progressing  Goal: Maintains/Returns to pre admission functional level  Description: INTERVENTIONS:  - Perform BMAT or MOVE assessment daily    - Set and communicate daily mobility goal to care team and patient/family/caregiver  - Collaborate with rehabilitation services on mobility goals if consulted  - Perform Range of Motion *** times a day  - Reposition patient every *** hours    - Dangle patient *** times a day  - Stand patient *** times a day  - Ambulate patient *** times a day  - Out of bed to chair *** times a day   - Out of bed for meals *** times a day  - Out of bed for toileting  - Record patient progress and toleration of activity level   Outcome: Progressing

## 2023-03-08 NOTE — PROGRESS NOTES
Shannan Griffin  Progress Note - Mary Kate Edi 1945, [de-identified] y o  male MRN: [id number]  Unit/Bed#: E5 -01 Encounter: [id number]  Primary Care Provider: John Paul Welsh MD   Date and time admitted to hospital: 3/3/2023  2:31 PM    * Sepsis [facility])  Assessment & Plan  · Sepsis on admission secondary to VRE based on outside urine culture on 2/27/2023  · Currently on linezolid currently day 6 of 7  · Blood cultures negative  · However having worsening leukocytosis may be all tumor burden/malignancy related  Does have history of pleural effusion  Will consult ID and pulmonology for recommendations    Results from last 7 days   Lab Units 03/08/23  0444 03/06/23  0524 03/05/23  0620 03/04/23  0617 03/03/23  1534   WBC Thousand/uL 19 25* 15 69* 15 56* 15 79* 14 54*       Depression  Assessment & Plan  · Stable continue abilify   · Duloxetine discontinued due to concerns for interaction with linezolid  Hyponatremia  Assessment & Plan  · Nonspecific hyponatremia likely secondary to poor intake  Duloxetine discontinued  · Urology consulted for assistance  Started on gentle IV fluids    Results from last 7 days   Lab Units 03/08/23  0444 03/07/23  0521 03/06/23  1549 03/06/23  0524   SODIUM mmol/L 132* 133* 132* 132*       Moderate protein-calorie malnutrition (HCC)  Assessment & Plan  Malnutrition Findings:   Adult Malnutrition type: Chronic illness  Adult Degree of Malnutrition: Malnutrition of moderate degree  Malnutrition Characteristics: Inadequate energy, Weight loss, Fluid accumulation  360 Statement: PCM r/t cancer with chemoradiation AEB, a 6 6% unintended wt loss from 01/02/23 to present and a 12 8% unintended wt loss from 08/26/22 to present and < 75% energy intake compared to estimated energy needs for > 1 month  BMI Findings: Body mass index is 22 89 kg/m²  CAD (coronary artery disease)  Assessment & Plan  · CAD with history of PCI  No chest pain      Cancer related pain  Assessment & Plan  · Continue oxycodone as needed    Bilateral hydronephrosis  Assessment & Plan  · Bilateral hydronephrosis secondary to bladder cancer with bilateral nephrostomy tubes in place  · No new urology recommendations at this time    Stage 4 chronic kidney disease [facility])  Assessment & Plan  · Stable at baseline    Results from last 7 days   Lab Units 03/08/23  0444 03/07/23  0521 03/06/23  0524 03/05/23  0620 03/04/23  0617 03/03/23  1754   BUN mg/dL 45* 47* 46* 47* 48* 50*   CREATININE mg/dL 2 47* 2 49* 2 37* 2 40* 2 39* 2 50*   EGFR ml/min/1 73sq m 23 23 24 24 24 23       Anemia  Assessment & Plan  · Chronic anemia without blood loss  · Hemoglobin stable    Results from last 7 days   Lab Units 03/08/23  0444 03/06/23  0524 03/05/23  0620 03/04/23  0617   HEMOGLOBIN g/dL 9 4* 8 4* 8 3* 8 5*       Type 2 diabetes mellitus, with long-term current use of insulin [facility])  Assessment & Plan  Lab Results   Component Value Date    HGBA1C 6 7 (H) 03/03/2023     Recent Labs     03/08/23  0711 03/08/23  0748 03/08/23  1105 03/08/23  1605   POCGLU 68 115 86 179*     · Currently on low-dose glargine with sliding scale    Bladder carcinoma (HCC)  Assessment & Plan  · Invasive carcinoma of bladder known to urology service  · No evidence of bleeding this admission  · Has an appointment with Zack Donnelly tomorrow  · Most recent PET scan demonstrates advancement of metastatic disease      VTE Pharmacologic Prophylaxis: VTE Score: 8 High Risk (Score >/= 5) - Pharmacological DVT Prophylaxis Ordered: heparin  Sequential Compression Devices Ordered  Patient Centered Rounds: I have performed bedside rounds with nursing staff today  Discussions with Specialists or Other Care Team Provider: Case management and nephrology    Education and Discussions with Family / Patient: Updated  (Granddaughter) via phone  Time Spent for Care:    This time was spent on one or more of the following: performing physical exam; counseling and coordination of care; obtaining or reviewing history; documenting in the medical record; reviewing/ordering tests, medications or procedures; communicating with other healthcare professionals and discussing with patient's family/caregivers  Current Length of Stay: 5 day(s)  Current Patient Status: Inpatient   Certification Statement: The patient will continue to require additional inpatient hospital stay due to Further investigations regarding leukocytosis  Discharge Plan: Anticipate discharge in 24-48 hrs to home  Code Status: Level 3 - DNAR and DNI      Subjective:   Patient seen and examined  Feeling a little bit weak but no other new complaints  Still poor appetite    Objective:   Vitals: Blood pressure 140/79, pulse 94, temperature (!) 97 3 °F (36 3 °C), temperature source Oral, resp  rate 14, height 6' 4" (1 93 m), weight 85 3 kg (188 lb 0 8 oz), SpO2 100 %  Intake/Output Summary (Last 24 hours) at 3/8/2023 1801  Last data filed at 3/8/2023 1356  Gross per 24 hour   Intake 240 ml   Output 1250 ml   Net -1010 ml       Physical Exam  Vitals reviewed  Constitutional:       General: He is not in acute distress  HENT:      Head: Atraumatic  Cardiovascular:      Rate and Rhythm: Regular rhythm  Pulmonary:      Effort: Pulmonary effort is normal       Breath sounds: Decreased breath sounds present  No wheezing  Abdominal:      General: Bowel sounds are normal       Palpations: Abdomen is soft  Tenderness: There is no abdominal tenderness  There is no rebound  Genitourinary:     Comments: Nephrostomy tubes present  Musculoskeletal:         General: No swelling or tenderness  Cervical back: Normal range of motion  Skin:     General: Skin is warm and dry  Neurological:      General: No focal deficit present  Mental Status: He is alert  Cranial Nerves: No cranial nerve deficit  Psychiatric:         Mood and Affect: Affect is flat         Additional Data: Labs:  Results from last 7 days   Lab Units 03/08/23  0444 03/06/23  0524 03/05/23  0620   WBC Thousand/uL 19 25* 15 69* 15 56*   HEMOGLOBIN g/dL 9 4* 8 4* 8 3*   PLATELETS Thousands/uL 298 237 247   MCV fL 90 91 92     Results from last 7 days   Lab Units 03/08/23  0444 03/07/23  0521 03/06/23  1549 03/06/23  0524   SODIUM mmol/L 132* 133* 132* 132*   POTASSIUM mmol/L 5 1 5 2  --  5 1   CHLORIDE mmol/L 100 99  --  102   CO2 mmol/L 24 25  --  22   ANION GAP mmol/L 8 9  --  8   BUN mg/dL 45* 47*  --  46*   CREATININE mg/dL 2 47* 2 49*  --  2 37*   CALCIUM mg/dL 10 7* 10 3*  --  10 0   EGFR ml/min/1 73sq m 23 23  --  24   GLUCOSE RANDOM mg/dL 78 72  --  71                      Results from last 7 days   Lab Units 03/03/23  1534   LACTIC ACID mmol/L 1 1     Results from last 7 days   Lab Units 03/08/23  1605 03/08/23  1105 03/08/23  0748 03/08/23  0711 03/07/23  2048 03/07/23  1625 03/07/23  1042 03/07/23  0740 03/07/23  0537 03/06/23  2117 03/06/23  1634 03/06/23  1046   POC GLUCOSE mg/dl 179* 86 115 68 116 94 78 124 78 118 97 131     Results from last 7 days   Lab Units 03/03/23  1534   HEMOGLOBIN A1C % 6 7*     Results from last 7 days   Lab Units 03/08/23  0444   TSH 3RD GENERATON uIU/mL 1 930     * I Have Reviewed All Lab Data Listed Above  Cultures:   Results from last 7 days   Lab Units 03/03/23  1702 03/03/23  1646 03/03/23  1534   BLOOD CULTURE   --  No Growth After 4 Days  No Growth After 4 Days  URINE CULTURE  >100,000 cfu/ml  --   --                  Lines/Drains:  Invasive Devices     Peripheral Intravenous Line  Duration           Long-Dwell Peripheral IV (Midline) 03/08/23 Left <1 day          Drain  Duration           Nephrostomy Left 10 2 Fr  57 days    Nephrostomy Right 10 2 Fr   57 days              Telemetry:      Imaging:  Imaging Reports Reviewed Today Include:   XR chest pa & lateral    Result Date: 3/5/2023  Impression: Similar patchy opacity of the left base compared to the PET CT of 3/1/2023, which may represent atelectasis, tumor, and/or infection/inflammation, or combination thereof  Stable small left pleural effusion compared to the PET CT of 3/1/2023  No pneumothorax  Workstation performed: [id number]       Scheduled Meds:  Current Facility-Administered Medications   Medication Dose Route Frequency Provider Last Rate   • acetaminophen  975 mg Oral Q8H PRN Jamin Dewitt PA-C     • aluminum-magnesium hydroxide-simethicone  30 mL Oral Q6H PRN Kenny Barer, DO     • ARIPiprazole  2 mg Oral Daily [facility] Tone, DO     • aspirin  81 mg Oral Daily [facility] Tone, DO     • bimatoprost  1 drop Both Eyes HS [facility] Tone, DO     • docusate sodium  100 mg Oral BID Kenny Barer, DO     • ezetimibe  10 mg Oral Daily [facility] Tone, DO     • gabapentin  300 mg Oral HS [facility] Tone, DO     • heparin (porcine)  5,000 Units Subcutaneous [facility] Tone, DO     • insulin glargine  10 Units Subcutaneous HS [facility] Tone, DO     • lidocaine  1 patch Topical Daily Karen Quiles PA-C     • linezolid  600 mg Oral Q12H [facility] Tone, DO     • metoprolol tartrate  25 mg Oral Q12H [address] [facility] Tone, DO     • ondansetron  4 mg Oral 4x Daily Fabiano Villanueva, DO     • oxybutynin  5 mg Oral TID Kenny Sheets, DO     • oxyCODONE  5 mg Oral Q4H PRN Neo Gamble PA-C     • pantoprazole  40 mg Oral Daily Before Breakfast Piyush Piedad Wayne, DO     • polyethylene glycol  17 g Oral BID Karen Quiles PA-C     • simethicone  80 mg Oral 4x Daily PRN Kenny Sheets, DO     • sodium chloride  50 mL/hr Intravenous Continuous Elise Faust MD 50 mL/hr (03/08/23 1223)       Today, Patient Was Seen By: Pavan Villagran DO    ** Please Note: Dictation voice to text software may have been used in the creation of this document   **

## 2023-03-09 ENCOUNTER — APPOINTMENT (INPATIENT)
Dept: CT IMAGING | Facility: HOSPITAL | Age: 78
End: 2023-03-09

## 2023-03-09 PROBLEM — R65.10 SIRS (SYSTEMIC INFLAMMATORY RESPONSE SYNDROME) (HCC): Status: ACTIVE | Noted: 2023-03-03

## 2023-03-09 LAB
ALBUMIN SERPL BCP-MCNC: 2.9 G/DL (ref 3.5–5)
ALP SERPL-CCNC: 114 U/L (ref 34–104)
ALT SERPL W P-5'-P-CCNC: 14 U/L (ref 7–52)
ANION GAP SERPL CALCULATED.3IONS-SCNC: 7 MMOL/L (ref 4–13)
AST SERPL W P-5'-P-CCNC: 14 U/L (ref 13–39)
BILIRUB SERPL-MCNC: 0.34 MG/DL (ref 0.2–1)
BUN SERPL-MCNC: 47 MG/DL (ref 5–25)
CALCIUM ALBUM COR SERPL-MCNC: 11 MG/DL (ref 8.3–10.1)
CALCIUM SERPL-MCNC: 10.1 MG/DL (ref 8.4–10.2)
CHLORIDE SERPL-SCNC: 100 MMOL/L (ref 96–108)
CO2 SERPL-SCNC: 26 MMOL/L (ref 21–32)
CREAT SERPL-MCNC: 2.57 MG/DL (ref 0.6–1.3)
ERYTHROCYTE [DISTWIDTH] IN BLOOD BY AUTOMATED COUNT: 16.3 % (ref 11.6–15.1)
GFR SERPL CREATININE-BSD FRML MDRD: 22 ML/MIN/1.73SQ M
GLUCOSE SERPL-MCNC: 109 MG/DL (ref 65–140)
GLUCOSE SERPL-MCNC: 115 MG/DL (ref 65–140)
GLUCOSE SERPL-MCNC: 137 MG/DL (ref 65–140)
GLUCOSE SERPL-MCNC: 65 MG/DL (ref 65–140)
GLUCOSE SERPL-MCNC: 96 MG/DL (ref 65–140)
HCT VFR BLD AUTO: 26.7 % (ref 36.5–49.3)
HGB BLD-MCNC: 8.5 G/DL (ref 12–17)
MCH RBC QN AUTO: 29.2 PG (ref 26.8–34.3)
MCHC RBC AUTO-ENTMCNC: 31.8 G/DL (ref 31.4–37.4)
MCV RBC AUTO: 92 FL (ref 82–98)
PLATELET # BLD AUTO: 248 THOUSANDS/UL (ref 149–390)
PMV BLD AUTO: 10.2 FL (ref 8.9–12.7)
POTASSIUM SERPL-SCNC: 5 MMOL/L (ref 3.5–5.3)
PROT SERPL-MCNC: 6.8 G/DL (ref 6.4–8.4)
RBC # BLD AUTO: 2.91 MILLION/UL (ref 3.88–5.62)
SODIUM SERPL-SCNC: 133 MMOL/L (ref 135–147)
WBC # BLD AUTO: 18.24 THOUSAND/UL (ref 4.31–10.16)

## 2023-03-09 RX ORDER — HYDROMORPHONE HCL IN WATER/PF 6 MG/30 ML
0.2 PATIENT CONTROLLED ANALGESIA SYRINGE INTRAVENOUS EVERY 4 HOURS PRN
Status: DISCONTINUED | OUTPATIENT
Start: 2023-03-09 | End: 2023-03-10

## 2023-03-09 RX ORDER — MIRTAZAPINE 15 MG/1
7.5 TABLET, FILM COATED ORAL
Status: DISCONTINUED | OUTPATIENT
Start: 2023-03-09 | End: 2023-03-13 | Stop reason: HOSPADM

## 2023-03-09 RX ORDER — OXYCODONE HYDROCHLORIDE 10 MG/1
10 TABLET ORAL EVERY 4 HOURS PRN
Status: DISCONTINUED | OUTPATIENT
Start: 2023-03-09 | End: 2023-03-10

## 2023-03-09 RX ORDER — HYDROXYZINE HYDROCHLORIDE 25 MG/1
25 TABLET, FILM COATED ORAL ONCE
Status: COMPLETED | OUTPATIENT
Start: 2023-03-09 | End: 2023-03-09

## 2023-03-09 RX ADMIN — HEPARIN SODIUM 5000 UNITS: 5000 INJECTION INTRAVENOUS; SUBCUTANEOUS at 21:31

## 2023-03-09 RX ADMIN — ONDANSETRON 4 MG: 4 TABLET, ORALLY DISINTEGRATING ORAL at 17:45

## 2023-03-09 RX ADMIN — OXYBUTYNIN CHLORIDE 5 MG: 5 TABLET ORAL at 15:27

## 2023-03-09 RX ADMIN — OXYCODONE HYDROCHLORIDE 10 MG: 10 TABLET ORAL at 17:45

## 2023-03-09 RX ADMIN — ARIPIPRAZOLE 2 MG: 2 TABLET ORAL at 09:46

## 2023-03-09 RX ADMIN — LINEZOLID 600 MG: 600 TABLET, FILM COATED ORAL at 04:10

## 2023-03-09 RX ADMIN — OXYBUTYNIN CHLORIDE 5 MG: 5 TABLET ORAL at 09:47

## 2023-03-09 RX ADMIN — ACETAMINOPHEN 325MG 975 MG: 325 TABLET ORAL at 04:12

## 2023-03-09 RX ADMIN — DOCUSATE SODIUM 100 MG: 100 CAPSULE, LIQUID FILLED ORAL at 09:46

## 2023-03-09 RX ADMIN — GABAPENTIN 300 MG: 300 CAPSULE ORAL at 21:25

## 2023-03-09 RX ADMIN — DOCUSATE SODIUM 100 MG: 100 CAPSULE, LIQUID FILLED ORAL at 17:45

## 2023-03-09 RX ADMIN — ONDANSETRON 4 MG: 4 TABLET, ORALLY DISINTEGRATING ORAL at 21:25

## 2023-03-09 RX ADMIN — SODIUM CHLORIDE 500 ML: 0.9 INJECTION, SOLUTION INTRAVENOUS at 13:07

## 2023-03-09 RX ADMIN — POLYETHYLENE GLYCOL 3350 17 G: 17 POWDER, FOR SOLUTION ORAL at 09:44

## 2023-03-09 RX ADMIN — PANTOPRAZOLE SODIUM 40 MG: 40 TABLET, DELAYED RELEASE ORAL at 04:10

## 2023-03-09 RX ADMIN — OXYBUTYNIN CHLORIDE 5 MG: 5 TABLET ORAL at 21:25

## 2023-03-09 RX ADMIN — HEPARIN SODIUM 5000 UNITS: 5000 INJECTION INTRAVENOUS; SUBCUTANEOUS at 04:10

## 2023-03-09 RX ADMIN — OXYCODONE HYDROCHLORIDE 5 MG: 5 TABLET ORAL at 15:27

## 2023-03-09 RX ADMIN — POLYETHYLENE GLYCOL 3350 17 G: 17 POWDER, FOR SOLUTION ORAL at 21:31

## 2023-03-09 RX ADMIN — OXYCODONE HYDROCHLORIDE 5 MG: 5 TABLET ORAL at 20:16

## 2023-03-09 RX ADMIN — ASPIRIN 81 MG: 81 TABLET, COATED ORAL at 09:46

## 2023-03-09 RX ADMIN — HYDROMORPHONE HYDROCHLORIDE 0.2 MG: 0.2 INJECTION, SOLUTION INTRAMUSCULAR; INTRAVENOUS; SUBCUTANEOUS at 23:55

## 2023-03-09 RX ADMIN — BIMATOPROST 1 DROP: 0.1 SOLUTION/ DROPS OPHTHALMIC at 21:25

## 2023-03-09 RX ADMIN — OXYCODONE HYDROCHLORIDE 5 MG: 5 TABLET ORAL at 04:10

## 2023-03-09 RX ADMIN — MIRTAZAPINE 7.5 MG: 15 TABLET, FILM COATED ORAL at 21:25

## 2023-03-09 RX ADMIN — EZETIMIBE 10 MG: 10 TABLET ORAL at 09:46

## 2023-03-09 RX ADMIN — ONDANSETRON 4 MG: 4 TABLET, ORALLY DISINTEGRATING ORAL at 09:46

## 2023-03-09 RX ADMIN — HYDROXYZINE HYDROCHLORIDE 25 MG: 25 TABLET ORAL at 01:13

## 2023-03-09 RX ADMIN — HEPARIN SODIUM 5000 UNITS: 5000 INJECTION INTRAVENOUS; SUBCUTANEOUS at 13:07

## 2023-03-09 RX ADMIN — METOPROLOL TARTRATE 25 MG: 25 TABLET, FILM COATED ORAL at 21:25

## 2023-03-09 RX ADMIN — ONDANSETRON 4 MG: 4 TABLET, ORALLY DISINTEGRATING ORAL at 13:07

## 2023-03-09 NOTE — PROGRESS NOTES
[address] [facility]  Progress Note - Silver Keane 1945, [de-identified] y o  male MRN: [id number]  Unit/Bed#: E5 -01 Encounter: [id number]  Primary Care Provider: Mesfin Freedman MD   Date and time admitted to hospital: 3/3/2023  2:31 PM    * SIRS (systemic inflammatory response syndrome) ([address] )  Assessment & Plan  · SIRS with pyuria on admission secondary to VRE based on outside urine culture on 2/27/2023  · Day 7 of linezolid however after formal evaluation with ID, antibiotics can be discontinued as there is no true UTI  · Blood cultures negative; repeated urine cultures on admission mixed contaminants  · Patient is having leukocytosis which after multidisciplinary consultation with ID and oncology no evidence of acute infection is found  · The patient has widespread metastasis and should go to [facility] appointment today  Results from last 7 days   Lab Units 03/09/23  0645 03/08/23  0444 03/06/23  0524 03/05/23  0620 03/04/23  0617   WBC Thousand/uL 18 24* 19 25* 15 69* 15 56* 15 79*       Depression  Assessment & Plan  · Stable continue abilify   · Duloxetine discontinued due to concerns for interaction with linezolid  Hyponatremia  Assessment & Plan  · Nonspecific hyponatremia likely secondary to poor intake  Duloxetine discontinued  · Urology consulted for assistance    Started on gentle IV fluids    Results from last 7 days   Lab Units 03/09/23  0645 03/08/23  0444 03/07/23  0521 03/06/23  1549   SODIUM mmol/L 133* 132* 133* 132*       Moderate protein-calorie malnutrition (HCC)  Assessment & Plan  Malnutrition Findings:   Adult Malnutrition type: Chronic illness  Adult Degree of Malnutrition: Malnutrition of moderate degree  Malnutrition Characteristics: Inadequate energy, Weight loss, Fluid accumulation  360 Statement: PCM r/t cancer with chemoradiation AEB, a 6 6% unintended wt loss from 01/02/23 to present and a 12 8% unintended wt loss from 08/26/22 to present and < 75% energy intake compared to estimated energy needs for > 1 month  BMI Findings: Body mass index is 22 97 kg/m²  CAD (coronary artery disease)  Assessment & Plan  · CAD with history of PCI  No chest pain  Cancer related pain  Assessment & Plan  · Continue oxycodone as needed    Bilateral hydronephrosis  Assessment & Plan  · Bilateral hydronephrosis secondary to bladder cancer with bilateral nephrostomy tubes in place  · No new urology recommendations at this time    Stage 4 chronic kidney disease [facility])  Assessment & Plan  · Stable at baseline    Results from last 7 days   Lab Units 03/09/23  0645 03/08/23  0444 03/07/23  0521 03/06/23  0524 03/05/23  0620 03/04/23  0617 03/03/23  1754   BUN mg/dL 47* 45* 47* 46* 47* 48* 50*   CREATININE mg/dL 2 57* 2 47* 2 49* 2 37* 2 40* 2 39* 2 50*   EGFR ml/min/1 73sq m 22 23 23 24 24 24 23       Anemia  Assessment & Plan  · Chronic anemia without blood loss  · Hemoglobin stable    Results from last 7 days   Lab Units 03/09/23  0645 03/08/23  0444 03/06/23  0524 03/05/23  0620   HEMOGLOBIN g/dL 8 5* 9 4* 8 4* 8 3*       Type 2 diabetes mellitus, with long-term current use of insulin [facility])  Assessment & Plan  Lab Results   Component Value Date    HGBA1C 6 7 (H) 03/03/2023     Recent Labs     03/08/23  1105 03/08/23  1605 03/08/23  2052 03/09/23  0722   POCGLU 86 179* 92 96     · Currently on low-dose glargine with sliding scale    Bladder carcinoma (HCC)  Assessment & Plan  · Invasive carcinoma of bladder known to urology service  · No evidence of bleeding this admission  · Has an appointment with [facility] today  · Most recent PET scan demonstrates advancement of metastatic disease      VTE Pharmacologic Prophylaxis: VTE Score: 8 High Risk (Score >/= 5) - Pharmacological DVT Prophylaxis Ordered: heparin  Sequential Compression Devices Ordered  Patient Centered Rounds: I have performed bedside rounds with nursing staff today    Discussions with Specialists or Other Care Team Provider: Infectious disease pulmonology case management oncology    Education and Discussions with Family / Patient: Attempted to update  (Granddaughter) via phone  Left voicemail  Time Spent for Care: This time was spent on one or more of the following: performing physical exam; counseling and coordination of care; obtaining or reviewing history; documenting in the medical record; reviewing/ordering tests, medications or procedures; communicating with other healthcare professionals and discussing with patient's family/caregivers  Current Length of Stay: 6 day(s)  Current Patient Status: Inpatient   Certification Statement: Evaluate for discharge later today if okay with consulting staff  Discharge Plan: Later today    Code Status: Level 3 - DNAR and DNI      Subjective:   Patient seen and examined  No new complaints  Just feeling weak  Patient also does not want to Mrs Marisela Eldridge appointment    Objective:   Vitals: Blood pressure 103/66, pulse 104, temperature 98 3 °F (36 8 °C), resp  rate 20, height 6' 4" (1 93 m), weight 85 6 kg (188 lb 11 4 oz), SpO2 97 %  Intake/Output Summary (Last 24 hours) at 3/9/2023 1009  Last data filed at 3/9/2023 0952  Gross per 24 hour   Intake 360 ml   Output 935 ml   Net -575 ml       Physical Exam  Vitals reviewed  Constitutional:       General: He is not in acute distress  HENT:      Head: Atraumatic  Cardiovascular:      Rate and Rhythm: Regular rhythm  Heart sounds: Normal heart sounds  Pulmonary:      Effort: Pulmonary effort is normal       Breath sounds: Decreased breath sounds present  No wheezing  Abdominal:      General: Bowel sounds are normal       Palpations: Abdomen is soft  Tenderness: There is no abdominal tenderness  There is no rebound  Genitourinary:     Comments: Bilateral nephrostomy tubes present  Musculoskeletal:         General: No swelling or tenderness  Skin:     General: Skin is warm     Neurological: General: No focal deficit present  Mental Status: He is alert  Cranial Nerves: No cranial nerve deficit  Psychiatric:         Mood and Affect: Mood normal        Additional Data:   Labs:  Results from last 7 days   Lab Units 03/09/23  0645 03/08/23  0444 03/06/23  0524   WBC Thousand/uL 18 24* 19 25* 15 69*   HEMOGLOBIN g/dL 8 5* 9 4* 8 4*   PLATELETS Thousands/uL 248 298 237   MCV fL 92 90 91     Results from last 7 days   Lab Units 03/09/23  0645 03/08/23  0444 03/07/23  0521   SODIUM mmol/L 133* 132* 133*   POTASSIUM mmol/L 5 0 5 1 5 2   CHLORIDE mmol/L 100 100 99   CO2 mmol/L 26 24 25   ANION GAP mmol/L 7 8 9   BUN mg/dL 47* 45* 47*   CREATININE mg/dL 2 57* 2 47* 2 49*   CALCIUM mg/dL 10 1 10 7* 10 3*   ALBUMIN g/dL 2 9*  --   --    TOTAL BILIRUBIN mg/dL 0 34  --   --    ALK PHOS U/L 114*  --   --    ALT U/L 14  --   --    AST U/L 14  --   --    EGFR ml/min/1 73sq m 22 23 23   GLUCOSE RANDOM mg/dL 65 78 72                      Results from last 7 days   Lab Units 03/03/23  1534   LACTIC ACID mmol/L 1 1     Results from last 7 days   Lab Units 03/09/23  0722 03/08/23  2052 03/08/23  1605 03/08/23  1105 03/08/23  0748 03/08/23  0711 03/07/23  2048 03/07/23  1625 03/07/23  1042 03/07/23  0740 03/07/23  0537 03/06/23  2117   POC GLUCOSE mg/dl 96 92 179* 86 115 68 116 94 78 124 78 118     Results from last 7 days   Lab Units 03/03/23  1534   HEMOGLOBIN A1C % 6 7*     Results from last 7 days   Lab Units 03/08/23  0444   TSH 3RD GENERATON uIU/mL 1 930     * I Have Reviewed All Lab Data Listed Above  Cultures:   Results from last 7 days   Lab Units 03/03/23  1702 03/03/23  1646 03/03/23  1534   BLOOD CULTURE   --  No Growth After 5 Days  No Growth After 5 Days     URINE CULTURE  >100,000 cfu/ml  --   --                  Lines/Drains:  Invasive Devices     Peripheral Intravenous Line  Duration           Long-Dwell Peripheral IV (Midline) 03/08/23 Left <1 day          Drain  Duration Nephrostomy Left 10 2 Fr  57 days    Nephrostomy Right 10 2 Fr  57 days              Telemetry:      Imaging:  Imaging Reports Reviewed Today Include:   XR chest pa & lateral    Result Date: 3/5/2023  Impression: Similar patchy opacity of the left base compared to the PET CT of 3/1/2023, which may represent atelectasis, tumor, and/or infection/inflammation, or combination thereof  Stable small left pleural effusion compared to the PET CT of 3/1/2023  No pneumothorax  Workstation performed: [id number]       Scheduled Meds:  Current Facility-Administered Medications   Medication Dose Route Frequency Provider Last Rate   • acetaminophen  975 mg Oral Q8H PRN Arturo Vann PA-C     • aluminum-magnesium hydroxide-simethicone  30 mL Oral Q6H PRN Nancy Miller, DO     • ARIPiprazole  2 mg Oral Daily Piyushkolton Rodriguez, DO     • aspirin  81 mg Oral Daily Piyush Kaveh Rodriguez, DO     • bimatoprost  1 drop Both Eyes HS Piyushkolton Rodriguez, DO     • docusate sodium  100 mg Oral BID Nancy Miller, DO     • ezetimibe  10 mg Oral Daily Piyushkolton Rodriguez, DO     • gabapentin  300 mg Oral HS Piyushkolton Rodriguez, DO     • heparin (porcine)  5,000 Units Subcutaneous [facility] Kaveh Rodriguez, DO     • insulin glargine  10 Units Subcutaneous HS Piyush Kaveh Rodriguez, DO     • lidocaine  1 patch Topical Daily Perico Diaz PA-C     • metoprolol tartrate  25 mg Oral Q12H [address] Piyush Kaveh Rodriguez, DO     • ondansetron  4 mg Oral 4x Daily Fabiano Villanueva DO     • oxybutynin  5 mg Oral TID Nancy iMller, DO     • oxyCODONE  5 mg Oral Q4H PRN Lilliam Mancuso PA-C     • pantoprazole  40 mg Oral Daily Before Breakfast Piyush Rodriguez, DO     • polyethylene glycol  17 g Oral BID Vale Cantor PA-C     • simethicone  80 mg Oral 4x Daily PRN Piyush Rodriguez, DO         Today, Patient Was Seen By: Mariella Buerger, DO    ** Please Note: Dictation voice to text software may have been used in the creation of this document   **

## 2023-03-09 NOTE — ASSESSMENT & PLAN NOTE
· Chronic anemia without blood loss    · Hemoglobin stable    Results from last 7 days   Lab Units 03/09/23  0645 03/08/23  0444 03/06/23  0524 03/05/23  0620   HEMOGLOBIN g/dL 8 5* 9 4* 8 4* 8 3*

## 2023-03-09 NOTE — ASSESSMENT & PLAN NOTE
· Stable at baseline    Results from last 7 days   Lab Units 03/09/23  0645 03/08/23  0444 03/07/23  0521 03/06/23  0524 03/05/23  0620 03/04/23  0617 03/03/23  1754   BUN mg/dL 47* 45* 47* 46* 47* 48* 50*   CREATININE mg/dL 2 57* 2 47* 2 49* 2 37* 2 40* 2 39* 2 50*   EGFR ml/min/1 73sq m 22 23 23 24 24 24 23

## 2023-03-09 NOTE — ASSESSMENT & PLAN NOTE
Malnutrition Findings:   Adult Malnutrition type: Chronic illness  Adult Degree of Malnutrition: Malnutrition of moderate degree  Malnutrition Characteristics: Inadequate energy, Weight loss, Fluid accumulation  360 Statement: PCM r/t cancer with chemoradiation AEB, a 6 6% unintended wt loss from 01/02/23 to present and a 12 8% unintended wt loss from 08/26/22 to present and < 75% energy intake compared to estimated energy needs for > 1 month  BMI Findings: Body mass index is 22 97 kg/m²

## 2023-03-09 NOTE — ASSESSMENT & PLAN NOTE
· Nonspecific hyponatremia likely secondary to poor intake  Duloxetine discontinued  · Urology consulted for assistance    Started on gentle IV fluids    Results from last 7 days   Lab Units 03/09/23  0645 03/08/23  0444 03/07/23  0521 03/06/23  1549   SODIUM mmol/L 133* 132* 133* 132*

## 2023-03-09 NOTE — CONSULTS
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  · Underwent several intravesical therapies  · S/p 5FU/MTC + RT 10/2021; progression on PET/CT 6/28/2022, had 2 cycles of Keytruda before progression  Patient started on enfortumab vedotin-ejfv, s/p 3 cycles, last cycle 12/2/2022 due to multiple hospitalizations  · Presented to the ED with flank pain and dysuria, completed IV abx for UTI  · PET/CT revealing progression of disease    PET/CT 3/1/23:  1  Overall findings compatible with disease progression  2   More extensive lobular pleural thickening in the left hemithorax with heterogeneous FDG uptake most suspicious for metastasis which has progressed from recent CT  Associated extension into the chest wall anteriorly and laterally  3   New left hilar focus may represent fabien metastasis  No suspicious focal FDG uptake in the mediastinal or perihilar region  4   New FDG uptake in a left axillary lymph node suspicious for metastasis  New foci of FDG uptake in the left lateral chest wall within the musculature suspicious for intramuscular metastasis  5   Scattered foci of FDG uptake in the liver most compatible with metastasis, new from prior PET/CT, noted on recent CTs  Confluent area of uptake noted in the right lobe of liver laterally, measuring on the order of 7 5 cm in size  6   Scattered FDG avid retroperitoneal lymph nodes identified suspicious for metastasis  7   A few new FDG avid lymph nodes along the left iliac chain suspicious for metastasis  8   New small focus of FDG uptake at the base of the penis, metastasis is not excluded  9   Scattered new FDG avid osseous lesions suspicious for metastasis    Some of these correspond to lytic lesions on CT  2  Leukocytosis  · Chronic leukocytosis since January, likely multifactorial most tightly linked to disease progression and malignancy  · Patient afebrile, blood cultures negative, patient on RA, lung sounds clear    Discussion: During bedside rounding, had a long discussion with Niru Buitrago  Discussed his PET/CT showing progression of disease  Discussed in detail his options of either continuing with palliative chemotherapy or proceeding with comfort measures  Discussed in detail the risks/benefit of continuing treatment  Niru Buitrago expressed he wanted to go home  He is now aware of the extent of his disease and would like to make his own decision in collaboration with his granddaughter  Patient had an appointment at [facility] today for a second opinion and expressed of cancelling the appointment  He will further discuss his decision with his family  Patient is having anxiety, insomnia, and lack of appetite  He is also expressing his pain is not well controlled  Spoke with patient's granddaughter, Karen Caruso, informed her of our discussion with Niru Buitrago  She will further discuss with her grandfather  PLAN:  · Ordered Remeron for patient's insomnia and lack of appetite  · Recommend Palliative Medicine to assist with pain management  · Recommend PT/OT continue working with patient, would likely benefit with rehabilitation  · Encourage oral hydration and ambulation    Outpatient follow up plan: to be set up by inpatient Cancer Coordinator, REYNA DunawayT    Thank you for this consult  EVANGELINA Palma  Hematology-Oncology       History of present illness:  Jose Jiménez is a [de-identified] y o  male with PMHx of spinal stenosis, gastric bypass (2011), CKD, diabetes, renal vascular disease presented to the ED with a UTI and worsening flank pain  Hx of 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, initially diagnosed in 1994, followed by Dr Celis Shows   He underwent several intravesical therapies  S/p chemoradiation 2021, hx of malignant bilateral ureteral obstruction, with BL nephrostomy tubes along with right ureteral stent  Historically, he completed concurrent 5-FU/MTC + radiation therapy, but was found to have POD  PET/CT on 6/28/22 showed lung mets; had 2 cycles of q6 week Keytruda before POD   On 9/2/22, decision to start on enfortumab vedotin (Padcev) due to progression of disease even on Keytruda  Last infusion was on 12/2/22  His most recent PET/CT reveals progression of disease  Oncology consulted regarding transition of care  Review of Systems:   Review of Systems   Constitutional: Positive for activity change and appetite change  HENT: Negative  Eyes: Negative  Respiratory: Negative for cough and shortness of breath  Cardiovascular: Negative for chest pain and leg swelling  Gastrointestinal: Negative  Genitourinary: Positive for flank pain  Skin: Negative  Allergic/Immunologic: Negative  Neurological: Positive for weakness  Hematological: Negative  Psychiatric/Behavioral: Negative  Oncology History:   Cancer Staging   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)  Staging form: Urinary Bladder, AJCC 8th Edition  - Clinical stage from 10/18/2021: Stage II (cT2, cN0, cM0) - Signed by Caty Saez MD on 11/17/2021    Oncology History   Bladder carcinoma ([facility] [address] )   8/16/2016 Initial Diagnosis    Bladder carcinoma ([address] )     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)    Initial Diagnosis    Malignant neoplasm of anterior wall of urinary bladder ([address] )     1994 Surgery    TURBT      1994 - 1995 Chemotherapy    Induction intravesical BCG x 2      8/17/2016 Surgery    TURBT      12/13/2016 Surgery    TURBT  [facility])    Bladder, left posterior wall, biopsy: High-grade urothelial carcinoma in-situ  Muscularis propria is identified with no tumor seen       Bladder, trigone, biopsy: Non-invasive high-grade papillary urothelial carcinoma, and flat urothelial carcinoma in-situ  Muscularis propria is not identified  1/4/2017 - 2/8/2017 Chemotherapy    Induction intravesical BCG     6/19/2017 Surgery    TURBT  [facility])    - Posterior wall, biopsy: Mild chronic cystitis with focal urothelial atypia  Muscularis propria is identified      - Right lateral wall, biopsy: Granulomatous cystitis  Muscularis propria is identified  - Left lateral wall, biopsy: Non invasive high-grade urothelial carcinoma  Muscularis propria is not identified  4/26/2019 Surgery    TURBT   [facility])     - bladder, right posterior wall, biopsy: Urothelial carcinoma in situ   - bladder, right lateral wall, biopsy: Small focus of urothelial carcinoma in situ  - bladder, left posterior wall, biopsy: Urothelial carcinoma in situ   - bladder, left lateral wall, biopsy: Urothelial carcinoma in situ     - bladder, trigone, biopsy: Invasive high-grade urothelial carcinoma, invading into lamina propria  No lymphovascular invasion seen  Muscularis propria not present  Separate piece showing urothelial carcinoma in situ        7/2019 -  Chemotherapy    Induction intravesical BCG x 4      11/4/2019 Surgery    TURBT  [facility])    - Superficial bladder tumor, transurethral resection: Non-invasive high grade urothelial carcinoma, with urothelial carcinoma in situ  Muscularis propria is not identified      - Bladder tumor, transurethral resection: Non-invasive high grade urothelial carcinoma, with urothelial carcinoma in situ, see note  Muscularis propria is present and free of tumor  12/9/2019 - 1/28/2020 Chemotherapy    Induction intravesical BCG+INF        6/9/2020 - 6/23/2020 Chemotherapy    Maintenance intravesical BCG+INF        11/19/2020 Biopsy    TURBT ([facility])    A   Urinary Bladder, Left Posterior Bladder Wall:  -Extensively- denuded urothelial lined mucosa with mild chronic inflammation, vascular congestion  And edema   -Unremarkable small fragment of detrusor muscle present  -No evidence of invasive urothelial carcinoma seen     B  Urinary Bladder, Left lateral bladder Wall:  -Partially-denuded urothelial lined mucosa with mild  chronic inflammation  -Unremarkable small fragment of detrusor muscle present  -No evidence of invasive urothelial carcinoma seen     C  Urinary Bladder, Right Posterior Bladder wall:  -Urothelial lined mucosa with mild  chronic inflammation Von Brunn nests and mild urothelial atypia, possibly due to previous BCG administration   -Unremarkable small fragment of detrusor muscle present  -No evidence of invasive urothelial carcinoma seen     D  Urinary Bladder, Right Lateral Bladder Wall:  - Urothelial lined mucosa with mild  chronic inflammation   -Muscularis propria/ detrusor muscle is not present for evaluation    -No evidence of invasive urothelial carcinoma seen  E  Prostate, Prostate Tissue:  -Urothelial lined mucosa with mild chronic inflammation, prominent Von Brunn nests and Cystitis cystica   -Unremarkable small fragment of detrusor muscle present   -No evidence of malignancy seen  10/18/2021 -  Cancer Staged    Staging form: Urinary Bladder, AJCC 8th Edition  - Clinical stage from 10/18/2021: Stage II (cT2, cN0, cM0) - Signed by Benny Hays MD on 11/17/2021  Stage prefix: Initial diagnosis       [facility]2021 [facility]    [facility]    A  Left posterior wall, bladder (transurethral resection):     - Urothelial tissue with small atypical cauterized focus favored to represent superficially invasive high-grade urothelial carcinoma  - Muscularis propria (detrusor muscle) present, and negative for carcinoma  - Marked edema and mucosal denudation with thermal artifact noted  B  Anterior wall, bladder (transurethral resection):      - Invasive high-grade urothelial carcinoma  - Carcinoma invades muscularis propria (detrusor muscle)  C   Left lateral wall, bladder (transurethral resection):     - Urothelial tissue with small atypical focus with marked thermal artifact; cannot exclude superficial carcinoma  - Muscularis propria (detrusor muscle) present, and negative for carcinoma        - Marked edema and mucosal denudation with thermal artifact noted     1/24/2022 - 3/26/2022 Chemotherapy    mitoMYcin (MUTAMYCIN), 12 mg/m2 = 28 7 mg (100 % of original dose 12 mg/m2), Intravenous, Once, 1 of 1 cycle  Dose modification: 12 mg/m2 (original dose 12 mg/m2, Cycle 1), 3 mg/m2 (original dose 12 mg/m2, Cycle 1)  Administration: 7 2 mg (1/24/2022)  fluorouracil (ADRUCIL) ambulatory infusion Soln, 500 mg/m2/day = 4,780 mg (50 % of original dose 1,000 mg/m2/day), Intravenous, Over 96 hours, 1 of 1 cycle, Start date: 1/18/2022, End date: 1/23/2022  Dose modification: 500 mg/m2/day (original dose 1,000 mg/m2/day, Cycle 1, Reason: Dose modified as per discussion with consulting physician)     1/24/2022 - 3/24/2022 Radiation    Pelvis:1 6X 21 / 21 275 0 5,775 59      Treatment dates:  C1: 1/24/2022 - 3/24/2022     7/15/2022 - 8/26/2022 Chemotherapy    pembrolizumab (KEYTRUDA) IVPB, 400 mg, Intravenous, Once, 2 of 6 cycles  Administration: 400 mg (7/15/2022), 400 mg (8/26/2022)     9/16/2022 -  Chemotherapy    enfortumab vedotin-ejfv (PADCEV) IVPB, 125 mg (original dose 1 25 mg/kg), Intravenous, Once, 4 of 6 cycles  Dose modification: 125 mg (original dose 1 25 mg/kg, Cycle 1, Reason: Dose modified as per discussion with consulting physician), 1 mg/kg (original dose 1 25 mg/kg, Cycle 3, Reason: Anticipated Tolerance, Comment: falling more)  Administration: 120 mg (9/16/2022), 120 mg (9/23/2022), 120 mg (9/30/2022), 120 mg (10/14/2022), 120 mg (10/21/2022), 120 mg (10/28/2022), 120 mg (11/11/2022), 120 mg (11/25/2022), 100 mg (12/2/2022)         Past Medical History:   Past Medical History:   Diagnosis Date   • Anemia     Last assessed: 9/28/17   • Anxiety    • Arteriosclerotic cardiovascular disease     Last assessed: 9/28/17   • Arthritis    • Bladder cancer ([facility] )     bladder- had BCG treatments   • Chronic kidney disease     Stage IV   • CKD (chronic kidney disease) stage 4, GFR 15-29 ml/min ([facility])    • Colon polyp    • Coronary artery disease     7 stents   • Depression    • Diabetes mellitus ([facility])     IDDM   • GERD (gastroesophageal reflux disease)    • Glaucoma    • Hematuria    • History of fusion of cervical spine    • Hyperlipidemia    • Hypertension    • Insomnia     Last assessed: 11/14/12   • Loss of hearing     has hearing aids but usually does not wear them   • Lung cancer ([facility] )    • Metastatic cancer ([place] [address] )    • Other seasonal allergic rhinitis     Last assessed: 2/10/16   • PAD (peripheral artery disease) ([facility])    • Shortness of breath     on exertion   • Spinal stenosis of lumbar region    • Transient cerebral ischemia     No Residual   • Uses walker     w/c for longer distances       Past Surgical History:   Procedure Laterality Date   • CARDIAC SURGERY      Cath stent placement  Last assessed: 3/9/17  Interventional Catheterization   • CHOLECYSTECTOMY     • COLONOSCOPY     • CYSTOSCOPY      Diagnostic w/biopsy  Adrielee Corporal  Last assessed: 12/1/14   • CYSTOSCOPY N/A 04/12/2022    Procedure: CYSTOSCOPY  Bladder biopsies  ;  Surgeon: Dennise Wayne MD;  [facility];  Service: Urology   • CYSTOSCOPY W/ RETROGRADES Right 03/01/2022    Procedure: CYSTO; stent removal retrograde;  Surgeon: Dennise Wayne MD;  [facility];  Service: Urology   • CYSTOSCOPY W/ URETERAL STENT PLACEMENT Bilateral 10/18/2021    Procedure: bilateral retrogrades, cytology collection;  Surgeon: Dennise Wayne MD;  [facility];  Service: Urology   • CYSTOURETHROSCOPY      w/cautery   Lauree Corporal   • FL RETROGRADE PYELOGRAM  10/18/2021   • FL RETROGRADE PYELOGRAM  10/24/2021   • FL RETROGRADE PYELOGRAM  12/14/2022   • GASTRIC BYPASS For morbid obesity w/Shaji-en-Y   Resolved: 11/17/09   • INCISION AND DRAINAGE OF WOUND Right 02/26/2017    Procedure: INCISION AND DRAINAGE (I&D) EXTREMITY WITH APPLICATION OF GRAFT JACKET;  Surgeon: Katerin Fletcher DPM;  [facility];  Service:    • INCISION AND DRAINAGE OF WOUND Right 04/25/2017    Procedure: INCISION AND DRAINAGE (I&D) EXTREMITY, APPLICATION OF GRAFT;  Surgeon: Katerin Fletcher DPM;  [facility];  Service:    • IR BIOPSY OTHER  07/02/2020   • IR LOWER EXTREMITY ANGIOGRAM  02/08/2021   • IR LOWER EXTREMITY ANGIOGRAM  02/11/2021   • IR NEPHROSTOMY TUBE CHECK/CHANGE/REPOSITION/REINSERTION/UPSIZE  04/28/2022   • IR NEPHROSTOMY TUBE CHECK/CHANGE/REPOSITION/REINSERTION/UPSIZE  05/24/2022   • IR NEPHROSTOMY TUBE CHECK/CHANGE/REPOSITION/REINSERTION/UPSIZE  06/07/2022   • IR NEPHROSTOMY TUBE CHECK/CHANGE/REPOSITION/REINSERTION/UPSIZE  07/28/2022   • IR NEPHROSTOMY TUBE CHECK/CHANGE/REPOSITION/REINSERTION/UPSIZE  11/15/2022   • IR NEPHROSTOMY TUBE CHECK/CHANGE/REPOSITION/REINSERTION/UPSIZE  1/10/2023   • IR NEPHROSTOMY TUBE PLACEMENT  02/25/2022   • IR PORT PLACEMENT  01/17/2022   • IR PORT REMOVAL  1/10/2023   • IR THORACENTESIS  09/02/2022   • IR THORACENTESIS  09/14/2022   • IR THORACENTESIS WITH TUBE PLACEMENT Left     october   • IR TUNNELED CENTRAL LINE PLACEMENT  12/24/2020   • JOINT REPLACEMENT      christofer knees replaced   • MS AMPUTATION METATARSAL W/TOE SINGLE Left 12/21/2020    Procedure: RAY RESECTION FOOT;  Surgeon: Emily Campbell DPM;  [facility];  Service: Podiatry   • MS AMPUTATION METATARSAL W/TOE SINGLE Left 12/31/2020    Procedure: 5TH MET RESECTION;  Surgeon: Emily Campbell DPM;  [facility];  Service: Podiatry   • MS CYSTO BLADDER W/URETERAL CATHETERIZATION Right 12/14/2022    Procedure: CYSTOSCOPY WITH RETROGRADE PYELOGRAM and CLOT EVACUATION, RIGHT STENT INSERTION, FULGRATION OF BLEEDING POINTS;  Surgeon: Ellen High MD;  [facility];  Service: Urology   • MA CYSTO W/IRRIG & EVAC MULTPLE OBSTRUCTING CLOTS N/A 02/10/2021    Procedure: CYSTOSCOPY EVACUATION OF CLOTS, fulguration;  Surgeon: Justin Watkins MD;  [facility];  Service: Urology   • MA CYSTO W/IRRIG & EVAC MULTPLE OBSTRUCTING CLOTS N/A 10/24/2021    Procedure: CYSTOSCOPY EVACUATION OF CLOT, fulguration of bleeding vessels, right ureter stent placement, retrograde pyelogram;  Surgeon: Jacquie Morales MD;  [facility];  Service: Urology   • MA CYSTO W/REMOVAL OF LESIONS SMALL N/A 11/19/2020    Procedure: CYSTO W/BIOPSIES, transurethral prostate bx;  Surgeon: Ban Nguyen MD;  [facility];  Service: Urology   • MA CYSTOURETHROSCOPY W/DEST &/RMVL TUMOR LARGE Bilateral 10/18/2021    Procedure: TRANSURETHRAL RESECTION OF BLADDER TUMOR (TURBT);   Surgeon: Angela Leong MD;  [facility];  Service: Urology   • MA CYSTOURETHROSCOPY WITH BIOPSY N/A 08/16/2016    Procedure: Adelaide Light;  Surgeon: Bea Pallas, MD;  [facility];  Service: Urology   • MA [address] 3RD+ ORD [address] [facility] Left 02/08/2021    Procedure: LEG angiogram, CO2 w/limited contrast with balloon angioplasty postertior tibial artery;  Surgeon: Dallin Berrios MD;  [facility];  Service: Vascular   • ROTATOR CUFF REPAIR     • SMALL INTESTINE SURGERY      Surgery Shaji-en-Y   • SPINAL FUSION      lumbar and cervical fusions   • VAC DRESSING APPLICATION Right 89/94/3428    Procedure: APPLICATION VAC DRESSING;  Surgeon: Giselle Blount DPM;  [facility];  Service:    • WOUND DEBRIDEMENT Left 02/16/2021    Procedure: FOOT DEBRIDE, [address] OUT w/graft application;  Surgeon: Giselle Blount DPM;  [facility];  Service: Podiatry       Family History   Problem Relation Age of Onset   • Diabetes Mother    • Heart disease Mother    • Other Mother         High blood pressure   • Heart disease Father    • Diabetes Sister    • Other Sister         High blood pressure   • Kidney disease Sister    • Heart disease Brother    • Other Brother         High blood pressure       Social History     Socioeconomic History   • Marital status: /Civil Union     Spouse name: Not on file   • Number of children: Not on file   • Years of education: Not on file   • Highest education level: Not on file   Occupational History   • Not on file   Tobacco Use   • Smoking status: Former     Packs/day: 3 00     Types: Cigarettes     Start date: 54     Quit date: 1970     Years since quittin 2     Passive exposure: Past   • Smokeless tobacco: Never   Vaping Use   • Vaping Use: Never used   Substance and Sexual Activity   • Alcohol use: Not Currently     Comment: occasional in the past   • Drug use: Not Currently     Types: Marijuana     Comment: quit 2019 had medical marijuana   • Sexual activity: Not Currently   Other Topics Concern   • Not on file   Social History Narrative    Consumes 1 cup of coffee and 1 soda per day     Social Determinants of Health     Financial Resource Strain: Not on file   Food Insecurity: No Food Insecurity   • Worried About Running Out of Food in the Last Year: Never true   • Ran Out of Food in the Last Year: Never true   Transportation Needs: No Transportation Needs   • Lack of Transportation (Medical): No   • Lack of Transportation (Non-Medical):  No   Physical Activity: Not on file   Stress: Not on file   Social Connections: Not on file   Intimate Partner Violence: Not on file   Housing Stability: Low Risk    • Unable to Pay for Housing in the Last Year: No   • Number of Places Lived in the Last Year: 1   • Unstable Housing in the Last Year: No         Current Facility-Administered Medications:   •  acetaminophen (TYLENOL) tablet 975 mg, 975 mg, Oral, Q8H PRN, Jamal Sequeira PA-C, 975 mg at 23 0412  •  aluminum-magnesium hydroxide-simethicone (MYLANTA) oral suspension 30 mL, 30 mL, Oral, Q6H PRN, Felipe Gill DO  • ARIPiprazole (ABILIFY) tablet 2 mg, 2 mg, Oral, Daily, Piyush Urrutia, DO, 2 mg at 03/09/23 0946  •  aspirin (ECOTRIN LOW STRENGTH) EC tablet 81 mg, 81 mg, Oral, Daily, Felipe Mad, DO, 81 mg at 03/09/23 0946  •  bimatoprost (LUMIGAN) 0 01 % ophthalmic solution 1 drop, 1 drop, Both Eyes, HS, Piyush Urrutia DO, 1 drop at 03/08/23 2117  •  docusate sodium (COLACE) capsule 100 mg, 100 mg, Oral, BID, Felipe Mad, DO, 100 mg at 03/09/23 8173  •  ezetimibe (ZETIA) tablet 10 mg, 10 mg, Oral, Daily, Felipe Mad, DO, 10 mg at 03/09/23 8818  •  gabapentin (NEURONTIN) capsule 300 mg, 300 mg, Oral, HS, Piyush Urrutia, DO, 300 mg at 03/08/23 2117  •  heparin (porcine) subcutaneous injection 5,000 Units, 5,000 Units, Subcutaneous, Q8H [facility], 5,000 Units at 03/09/23 0410 **AND** [CANCELED] Platelet count, , , Once, Piyush Urrutia DO  •  lidocaine (LIDODERM) 5 % patch 1 patch, 1 patch, Topical, Daily, Marii Nava PA-C, 1 patch at 03/07/23 1043  •  metoprolol tartrate (LOPRESSOR) tablet 25 mg, 25 mg, Oral, Q12H [facility], Piyush Urrutia DO, 25 mg at 03/08/23 2117  •  ondansetron (ZOFRAN-ODT) dispersible tablet 4 mg, 4 mg, Oral, 4x Daily, Fabiano Villanueva DO, 4 mg at 03/09/23 0946  •  oxybutynin (DITROPAN) tablet 5 mg, 5 mg, Oral, TID, Felipenilson Gill, DO, 5 mg at 03/09/23 0947  •  oxyCODONE (ROXICODONE) IR tablet 5 mg, 5 mg, Oral, Q4H PRN, Ashley Salinas PA-C, 5 mg at 03/09/23 0410  •  pantoprazole (PROTONIX) EC tablet 40 mg, 40 mg, Oral, Daily Before Breakfast, Piyush Urrutia DO, 40 mg at 03/09/23 0410  •  polyethylene glycol (MIRALAX) packet 17 g, 17 g, Oral, BID, Perico Diaz PA-C, 17 g at 03/09/23 1319  •  simethicone (MYLICON) chewable tablet 80 mg, 80 mg, Oral, 4x Daily PRN, Felipe Gill DO    Medications Prior to Admission   Medication   • acetaminophen (TYLENOL) 325 mg tablet   • aspirin (ECOTRIN LOW STRENGTH) 81 mg EC tablet   • Bimatoprost (LUMIGAN OP)   • docusate sodium (COLACE) 100 mg capsule   • ezetimibe (ZETIA) 10 mg tablet   • gabapentin (NEURONTIN) 300 mg capsule   • oxybutynin (DITROPAN) 5 mg tablet   • senna (SENOKOT) 8 6 MG tablet   • Accu-Chek Softclix Lancets lancets   • ARIPiprazole (ABILIFY) 2 mg tablet   • Azelastine HCl 0 15 % SOLN   • calcitriol (ROCALTROL) 0 25 mcg capsule   • DULoxetine (CYMBALTA) 30 mg delayed release capsule   • Ferrous Sulfate Dried (Feosol) 200 (65 Fe) MG TABS   • fluticasone (FLONASE) 50 mcg/act nasal spray   • furosemide (LASIX) 20 mg tablet   • furosemide (LASIX) 20 mg tablet   • Insulin Pen Needle 31G X 8 MM MISC   • loperamide (IMODIUM) 2 mg capsule   • metoprolol tartrate (LOPRESSOR) 25 mg tablet   • multivitamin (THERAGRAN) TABS   • omeprazole (PriLOSEC) 20 mg delayed release capsule   • oxyCODONE (OXY-IR) 5 MG capsule   • oxyCODONE (ROXICODONE) 10 MG TABS   • polyethylene glycol (MIRALAX) 17 g packet       Allergies   Allergen Reactions   • Statins Hives and Itching     Includes atorvastatin and simvastatin     • Insulin Lispro Swelling and Edema     " Lower Legs"   • Other Itching, Rash and Other (See Comments)     "EKG Patches"   "blue EKG patches"         Physical Exam:     /66   Pulse 104   Temp 98 3 °F (36 8 °C)   Resp 20   Ht 6' 4" (1 93 m)   Wt 85 6 kg (188 lb 11 4 oz)   SpO2 97%   BMI 22 97 kg/m²     Physical Exam  HENT:      Head: Normocephalic  Nose: Nose normal    Cardiovascular:      Rate and Rhythm: Regular rhythm  Heart sounds: Normal heart sounds  Pulmonary:      Effort: No respiratory distress  Breath sounds: Normal breath sounds  Comments: On room air  Abdominal:      Palpations: Abdomen is soft  Comments: Left flank pain   Musculoskeletal:      Cervical back: Normal range of motion  Right lower leg: No edema  Left lower leg: No edema  Skin:     General: Skin is warm and dry  Neurological:      Mental Status: He is alert and oriented to person, place, and time     Psychiatric: Mood and Affect: Mood normal          Behavior: Behavior normal          Thought Content:  Thought content normal          Judgment: Judgment normal          Recent Results (from the past 48 hour(s))   Fingerstick Glucose (POCT)    Collection Time: 03/07/23  4:25 PM   Result Value Ref Range    POC Glucose 94 65 - 140 mg/dl   Fingerstick Glucose (POCT)    Collection Time: 03/07/23  8:48 PM   Result Value Ref Range    POC Glucose 116 65 - 140 mg/dl   CBC    Collection Time: 03/08/23  4:44 AM   Result Value Ref Range    WBC 19 25 (H) 4 31 - 10 16 Thousand/uL    RBC 3 25 (L) 3 88 - 5 62 Million/uL    Hemoglobin 9 4 (L) 12 0 - 17 0 g/dL    Hematocrit 29 3 (L) 36 5 - 49 3 %    MCV 90 82 - 98 fL    MCH 28 9 26 8 - 34 3 pg    MCHC 32 1 31 4 - 37 4 g/dL    RDW 16 2 (H) 11 6 - 15 1 %    Platelets 879 569 - 240 Thousands/uL    MPV 10 4 8 9 - 12 7 fL   Basic metabolic panel    Collection Time: 03/08/23  4:44 AM   Result Value Ref Range    Sodium 132 (L) 135 - 147 mmol/L    Potassium 5 1 3 5 - 5 3 mmol/L    Chloride 100 96 - 108 mmol/L    CO2 24 21 - 32 mmol/L    ANION GAP 8 4 - 13 mmol/L    BUN 45 (H) 5 - 25 mg/dL    Creatinine 2 47 (H) 0 60 - 1 30 mg/dL    Glucose 78 65 - 140 mg/dL    Calcium 10 7 (H) 8 4 - 10 2 mg/dL    eGFR 23 ml/min/1 73sq m   Uric acid    Collection Time: 03/08/23  4:44 AM   Result Value Ref Range    Uric Acid 9 1 (H) 3 5 - 8 5 mg/dL   TSH, 3rd generation    Collection Time: 03/08/23  4:44 AM   Result Value Ref Range    TSH 3RD GENERATON 1 930 0 450 - 4 500 uIU/mL   Fingerstick Glucose (POCT)    Collection Time: 03/08/23  7:11 AM   Result Value Ref Range    POC Glucose 68 65 - 140 mg/dl   Fingerstick Glucose (POCT)    Collection Time: 03/08/23  7:48 AM   Result Value Ref Range    POC Glucose 115 65 - 140 mg/dl   Fingerstick Glucose (POCT)    Collection Time: 03/08/23 11:05 AM   Result Value Ref Range    POC Glucose 86 65 - 140 mg/dl   Sodium, urine, random    Collection Time: 03/08/23 12:35 PM   Result Value Ref Range    Sodium, Ur 60    Cortisol Level, AM Specimen    Collection Time: 03/08/23 12:35 PM   Result Value Ref Range    Cortisol - AM 17 4 4 2 - 22 4 ug/dL   Osmolality, urine    Collection Time: 03/08/23 12:41 PM   Result Value Ref Range    Osmolality, Ur 369 250 - 900 mmol/KG   Osmolality-If this is regarding a toxic alcohol, STOP  Test is not routinely indicated  Please consult medical  on call for further guidance      Collection Time: 03/08/23 12:43 PM   Result Value Ref Range    Osmolality Serum 294 282 - 298 mmol/KG   Fingerstick Glucose (POCT)    Collection Time: 03/08/23  4:05 PM   Result Value Ref Range    POC Glucose 179 (H) 65 - 140 mg/dl   Fingerstick Glucose (POCT)    Collection Time: 03/08/23  8:52 PM   Result Value Ref Range    POC Glucose 92 65 - 140 mg/dl   Comprehensive metabolic panel    Collection Time: 03/09/23  6:45 AM   Result Value Ref Range    Sodium 133 (L) 135 - 147 mmol/L    Potassium 5 0 3 5 - 5 3 mmol/L    Chloride 100 96 - 108 mmol/L    CO2 26 21 - 32 mmol/L    ANION GAP 7 4 - 13 mmol/L    BUN 47 (H) 5 - 25 mg/dL    Creatinine 2 57 (H) 0 60 - 1 30 mg/dL    Glucose 65 65 - 140 mg/dL    Calcium 10 1 8 4 - 10 2 mg/dL    Corrected Calcium 11 0 (H) 8 3 - 10 1 mg/dL    AST 14 13 - 39 U/L    ALT 14 7 - 52 U/L    Alkaline Phosphatase 114 (H) 34 - 104 U/L    Total Protein 6 8 6 4 - 8 4 g/dL    Albumin 2 9 (L) 3 5 - 5 0 g/dL    Total Bilirubin 0 34 0 20 - 1 00 mg/dL    eGFR 22 ml/min/1 73sq m   CBC    Collection Time: 03/09/23  6:45 AM   Result Value Ref Range    WBC 18 24 (H) 4 31 - 10 16 Thousand/uL    RBC 2 91 (L) 3 88 - 5 62 Million/uL    Hemoglobin 8 5 (L) 12 0 - 17 0 g/dL    Hematocrit 26 7 (L) 36 5 - 49 3 %    MCV 92 82 - 98 fL    MCH 29 2 26 8 - 34 3 pg    MCHC 31 8 31 4 - 37 4 g/dL    RDW 16 3 (H) 11 6 - 15 1 %    Platelets 043 911 - 265 Thousands/uL    MPV 10 2 8 9 - 12 7 fL   Fingerstick Glucose (POCT)    Collection Time: 03/09/23  7:22 AM   Result Value Ref Range    POC Glucose 96 65 - 140 mg/dl   Fingerstick Glucose (POCT)    Collection Time: 03/09/23 10:58 AM   Result Value Ref Range    POC Glucose 137 65 - 140 mg/dl       XR chest pa & lateral    Result Date: 3/5/2023  Narrative: CHEST INDICATION:   hx of left sided pleural effusion, decresaed breath sounds left side  COMPARISON:  Chest radiograph from January 11, 2023, PET/CT 3/1/2023, CT chest 1/17/2023 EXAM PERFORMED/VIEWS:  XR CHEST PA & LATERAL FINDINGS: Partially seen is cervical spine fixation hardware  Loop recorder projects over the left hemithorax  Bilateral pigtail percutaneous nephrostomy catheters project over the upper abdomen  Cardiomediastinal silhouette appears unremarkable  Similar patchy opacity of the left base compared to the PET CT of 3/1/2023, which may represent atelectasis, tumor, and/or infection/inflammation, or combination thereof  Stable small left pleural effusion compared to the PET CT of 3/1/2023  No pneumothorax  Osseous structures appear within normal limits for patient age  Impression: Similar patchy opacity of the left base compared to the PET CT of 3/1/2023, which may represent atelectasis, tumor, and/or infection/inflammation, or combination thereof  Stable small left pleural effusion compared to the PET CT of 3/1/2023  No pneumothorax  Workstation performed: [id number]     NM PET CT skull base to mid thigh    Result Date: 3/1/2023  Narrative: PET/CT SCAN INDICATION: Restaging of bladder cancer  Abnormal CT   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 MODIFIER: PS COMPARISON: CT chest 1/17/2023, CT abdomen pelvis 1/19/2023, PET/CT 6/28/2022 CELL TYPE:  Invasive high-grade urothelial carcinoma TECHNIQUE:   8 7 mCi F-18-FDG administered IV  Multiplanar attenuation corrected and non attenuation corrected PET images are available for interpretation, and contiguous, low dose, axial CT sections were obtained from the skull base through the femurs   Intravenous contrast material was not utilized  This examination, like all CT scans performed in the [facility], was performed utilizing techniques to minimize radiation dose exposure, including the use of iterative reconstruction and automated exposure control  Fasting serum glucose: 154 mg/dl FINDINGS: VISUALIZED BRAIN:   No acute abnormalities are seen  HEAD/NECK: Suboptimal assessment due to motion  No definite FDG avid lesions  CT images: Stable mild nodularity of thyroid gland without focal FDG uptake  CHEST:   More extensive lobular pleural thickening in the left hemithorax with heterogeneous FDG uptake most suspicious for metastasis  This has progressed from recent CT  Activity is most extensive at the lung base  A posterior lateral focus at the lung base demonstrates SUV max of 11, new from prior PET/CT  A large heterogeneous soft tissue focus at the left lung base anteriorly demonstrates SUV max 7 5, previously 9 3 but activity is significantly more extensive compared to prior exam   This appears to extend through the chest wall with associated rib destruction  New left hilar focus demonstrates SUV max of 4 5  This is not well seen on CT may represent fabien metastasis  No suspicious focal FDG uptake in the mediastinal or perihilar region  New FDG uptake in a left axillary lymph node demonstrates SUV maximum 3 4  This measures 8 mm short axis image 70 series 4  New foci of FDG uptake in the left lateral chest wall within the musculature suspicious for intramuscular metastasis  A focus here demonstrates SUV max of 4 7  Suggestion of corresponding soft tissue nodules on limited CT measuring on the order of 1 5 cm image 112 series 4  Previously noted FDG avid right lung nodule is not seen  The previously noted FDG avid lingular nodule is not well delineated from the extensive FDG avid pleural disease  CT images: Volume loss on the left with increased atelectasis in the left lower lobe    Extensive coronary artery calcifications  Implanted device at the left anterior chest wall  ABDOMEN:   Scattered foci of FDG uptake in the liver most compatible with metastasis, new from prior PET/CT  Confluent area of uptake noted in the right lobe of liver laterally, SUV max of 9 6  Corresponding confluent hypodensities suggested here on the low-dose CT measuring on the order of 7 5 cm in size  Additional smaller foci of uptake identified in the left and right lobes  Scattered FDG avid retroperitoneal lymph nodes identified  Left para-aortic node demonstrates SUV max of 11, previously with mild uptake and SUV max of 2 9  This lymph node measures up to 1 7 cm short axis image 144 series 4  CT images: Status post gastric bypass  Stable bilateral nephrostomy tubes  Right ureteral stent in position  No hydronephrosis  Scattered renal cysts  Nodularity at the bilateral anterior abdominal wall likely related to injections  PELVIS: A few new FDG avid lymph nodes along the left iliac chain  A left external iliac chain lymph node demonstrates SUV max of 4 1  This measures up to 8 mm short axis image 176 series 4  New small focus of FDG uptake at the base of the penis, SUV max of 8 7  There is a corresponding hypodensity here on CT measuring up to 8 mm in size  This may represent metastasis  Patchy linear radiotracer uptake in the musculature adjacent to the right acetabulum and head, question physiologic  Fairly diffuse bladder wall thickening  Limited evaluation for bladder lesions due to normal excretion of radioactive urine  CT images: Moderate right scrotal hydrocele  OSSEOUS STRUCTURES: Scattered new FDG avid osseous lesions suspicious for metastasis  Some of these correspond to lytic lesions on CT  Reference lesions as noted below: New FDG avid lesion at the right humeral head, SUV max of 6  Corresponding small lytic lesion noted on CT  Midsternal lesion demonstrates SUV max of 4 4    L3 lytic lesion anteriorly demonstrates SUV max of 5 5  Left anterior iliac lesion demonstrates SUV max of 5 7  Right posterior iliac lesion demonstrates SUV max of 5 3  Right sacral lesion adjacent max of 4 7  CT images: Anterior cervical fusion  Multilevel degenerative spurring of the spine  Surgical hardware at the right humeral head  Impression: 1  Overall findings compatible with disease progression  2   More extensive lobular pleural thickening in the left hemithorax with heterogeneous FDG uptake most suspicious for metastasis which has progressed from recent CT  Associated extension into the chest wall anteriorly and laterally  3   New left hilar focus may represent fabien metastasis  No suspicious focal FDG uptake in the mediastinal or perihilar region  4   New FDG uptake in a left axillary lymph node suspicious for metastasis  New foci of FDG uptake in the left lateral chest wall within the musculature suspicious for intramuscular metastasis  5   Scattered foci of FDG uptake in the liver most compatible with metastasis, new from prior PET/CT, noted on recent CTs  Confluent area of uptake noted in the right lobe of liver laterally, measuring on the order of 7 5 cm in size  6   Scattered FDG avid retroperitoneal lymph nodes identified suspicious for metastasis  7   A few new FDG avid lymph nodes along the left iliac chain suspicious for metastasis  8   New small focus of FDG uptake at the base of the penis, metastasis is not excluded  9   Scattered new FDG avid osseous lesions suspicious for metastasis  Some of these correspond to lytic lesions on CT  The study was marked in EPIC for significant notification  Workstation performed: [id number]       Labs and pertinent reports reviewed  This note has been generated by voice recognition software system  Therefore, there may be spelling, grammar, and or syntax errors  Please contact if questions arise

## 2023-03-09 NOTE — ASSESSMENT & PLAN NOTE
Lab Results   Component Value Date    HGBA1C 6 7 (H) 03/03/2023     Recent Labs     03/08/23  1105 03/08/23  1605 03/08/23 2052 03/09/23  0722   POCGLU 86 179* 92 96     · Currently on low-dose glargine with sliding scale

## 2023-03-09 NOTE — CONSULTS
Consultation - Infectious Disease   Desiree Swain [de-identified] y o  male MRN: [id number]  Unit/Bed#: E5 -01 Encounter: [id number]      IMPRESSION & RECOMMENDATIONS:     1  Leukocytosis:  - On review of labs, patient has had leukocytosis since 1/11/2023  His labs from February in [facility] noted WBC of 13-16 5, including day of discharge  He has also seen by our ID service in January as well for this same issue, a persistent elevated WBC with peripheral eosinophilia  This was felt at that time to be inflammatory in nature  Patient has again same findings this admission  He has not had any fever  Blood cultures are negative  He did have pyuria on UA, though always has pyuria likely in setting of his urothelial malignancy  He is being treated for the VRE found in urine from 2/27, though his more recent urine cx 3/3 was negative and his WBC has not responded regardless to the antibiotic therapy  Today he has no major pulmonary or GI complaints  His dysuria is improved  He does report he has been having left sided chest pain since the removal of his prior pleural catheter, as below  No signs of skin/soft tissue or joint infection on exam  No redness or drainage form PCN sites or PICC line  - suspect this is is possibly related to his extensive and progressive malignancy/metastsis, best noted on recent PET-CT from 3/1/23  - as completed adequate course for urine infection as below, would monitor off antibiotics for now  - follow up LFTs  - after discussion with patient's granddaughter, suggest we obtain repeat CT chest/abd/pelvis to assess for any new findings since 3/1 to suggest alternative cause of leukocytosis  - Pulmonary recommendations appreciated regarding progressive left pleural space tumor with possible erosion into chest wall  - may be helpful to have Oncology evaluate patient while here to review PET findings as well     2   Pyuria on UA/Recent VRE in Urine Cx:  - Patient has had innumerable WBCs on every UA in our system going back to 2/2022  This is a chronic finding, likely from his bladder malignancy, and will not be a helpful marker to indicate infection  He had a urine cx from 2/27/23 that grew both VRE and Staph epi  I question if this was real infection vs  possible contamination depending on how sample was collected  His repeat urine sample from 3/3, collected BEFORE antibiotics, grew only mixed contaminants further suggesting culture from 2/27 less likely true infection  Dysuria may also be explained by his disease progression in the  system  Regardless he has completed 6 days of Linezolid therapy which has not made a difference in his WBC  - would stop Linezolid; has already completed adequate duration for possible urine infection  - ongoing/intermittent dysuria may be related to urothelial disease itself    3  Left Pleural-based Tumor:  - This is a known finding  CT from 1/17/23 noted progression of this tumor, at that time noting progression even from 10 days prior  PET CT 3/1 now showing more extensive pleural thickening of this area, progressed since CT in January, and a large heterogenous soft tissue focus at left lung base also seen previously but now more extensive and possibly extending through chest wall with rib destruction       - given patient's overall history, along with additional many areas concerning for metastasis on scan, highly concerning for progressive malignancy  -  Appreciate Pulmonology if felt this needs to be biopsied vs  Sampling of pleural fluid  - If any sampling of tissue or fluid done, for completeness can send for aerobic culture, anaerobic culture, fungal culture, AFB culture    3   Concern for Metastatic Bladder Cancer:  - Recent PET CT 3/1/23 noted high concerns for progression of malignancy with increased pleural thickening on left and extension into chest wall, new left hilar focus concerning for lymph node metastasis, new left axillary node uptake, concern for left chest wall intramuscular mets, scattered foci in liver compatible with mets, RP lymph node uptake, uptake in left iliac chain lymph nodes, uptake in base of penis, and new avid osseous lesions suspicious for mets  - I suspect this is the primary  of his chronic leukocytosis  - consider Oncology evaluation while in hospital    4  CKD stage IV:  - Baseline Cr in 2 4 range  - currently at baseline  - daily BMP    5  Hx of Enterobacter bacteremia (1/2023):  - Patient completed treatment 1/17/23, felt to be likely transiet from  source  Nephrostomy tubes were exchanged and port removed on 1/10/23  Blood cultures this admission negative  I have discussed the above management plan in detail with the primary service    I have performed an extensive review of the medical records in Epic including review of the notes, radiographs, and laboratory results     HISTORY OF PRESENT ILLNESS:  Reason for Consult: leukocytosis    HPI: Claudio Dunbar is a [de-identified]y o  year old male PMHx bladder cancer s/p chemo/radiation (completed 2/2022) now on enfortumab, concern for progressive malignancy with likely metastasis to lungs/bladder/bone/RP lymph nodes, recurrent left lymphocytic pleural effusion, ureteral obstruction secondary to malignancy s/p bilateral PCNs, CKD, CAD, T2DM, HTN who presented to [facility] ED on 3/3 with complaints of dysuria  He does urinate from urethra despite PCNs in place  Per chart review it appears patient had outpatient UA done on 2/27 with >100 WBCs noted and culture that grew both VRE and Staph epidermidis  He was instructed to go to the ER for treatment of UTI  He had leukocytosis of 14 5 on arrival with peripheral eosinophilia  UA 3/3 noted innumerable WBCs with culture that grew only mixed contaminants  Patient tells me he had some dysuria prior to arrival leading to his outpatient UA/culture  This has resolved now  He denies cough, SOB, abdominal pain, diarrhea   No skin rash or joint pain  He does endorse pain on the left mid back with some wrapping around the side that has been present since his pleural catheter was removed  No pain around PCN sites  No other new complaints today  Per chart review, patient had previoulsy had a chronic catheter in left pleural space (originally placed 9/2022) for a recurrent left pleural effusion that was presumed malignant  He follows with Pulmonology at [facility] for this, catheter was removed 2/23  REVIEW OF SYSTEMS:  A complete review of systems is negative other than that noted in the HPI  PAST MEDICAL HISTORY:  Past Medical History:   Diagnosis Date   • Anemia     Last assessed: 9/28/17   • Anxiety    • Arteriosclerotic cardiovascular disease     Last assessed: 9/28/17   • Arthritis    • Bladder cancer ([facility] [place] [address] )     bladder- had BCG treatments   • Chronic kidney disease     Stage IV   • CKD (chronic kidney disease) stage 4, GFR 15-29 ml/min ([facility])    • Colon polyp    • Coronary artery disease     7 stents   • Depression    • Diabetes mellitus ([facility])     IDDM   • GERD (gastroesophageal reflux disease)    • Glaucoma    • Hematuria    • History of fusion of cervical spine    • Hyperlipidemia    • Hypertension    • Insomnia     Last assessed: 11/14/12   • Loss of hearing     has hearing aids but usually does not wear them   • Lung cancer ([facility] [place] [address] )    • [facility]    • Other seasonal allergic rhinitis     Last assessed: 2/10/16   • PAD (peripheral artery disease) ([facility])    • Shortness of breath     on exertion   • Spinal stenosis of lumbar region    • Transient cerebral ischemia     No Residual   • Uses walker     w/c for longer distances     Past Surgical History:   Procedure Laterality Date   • CARDIAC SURGERY      Cath stent placement  Last assessed: 3/9/17  Interventional Catheterization   • CHOLECYSTECTOMY     • COLONOSCOPY     • CYSTOSCOPY      Diagnostic w/biopsy  Christian Webb   Last assessed: 12/1/14   • CYSTOSCOPY N/A 04/12/2022    Procedure: CYSTOSCOPY  Bladder biopsies  ;  Surgeon: Nasrin Villarreal MD;  [facility];  Service: Urology   • CYSTOSCOPY W/ RETROGRADES Right 03/01/2022    Procedure: CYSTO; stent removal retrograde;  Surgeon: Nasrin Villarreal MD;  [facility];  Service: Urology   • CYSTOSCOPY W/ URETERAL STENT PLACEMENT Bilateral 10/18/2021    Procedure: bilateral retrogrades, cytology collection;  Surgeon: Nasrni Villarreal MD;  [facility];  Service: Urology   • CYSTOURETHROSCOPY      w/cautery  Star Melia   • FL RETROGRADE PYELOGRAM  10/18/2021   • FL RETROGRADE PYELOGRAM  10/24/2021   • FL RETROGRADE PYELOGRAM  12/14/2022   • GASTRIC BYPASS      For morbid obesity w/Shaji-en-Y   Resolved: 11/17/09   • INCISION AND DRAINAGE OF WOUND Right 02/26/2017    Procedure: INCISION AND DRAINAGE (I&D) EXTREMITY WITH APPLICATION OF GRAFT JACKET;  Surgeon: Pasquale Ramirez DPM;  [facility];  Service:    • INCISION AND DRAINAGE OF WOUND Right 04/25/2017    Procedure: INCISION AND DRAINAGE (I&D) EXTREMITY, APPLICATION OF GRAFT;  Surgeon: Pasquale Ramirez DPM;  [facility];  Service:    • IR BIOPSY OTHER  07/02/2020   • IR LOWER EXTREMITY ANGIOGRAM  02/08/2021   • IR LOWER EXTREMITY ANGIOGRAM  02/11/2021   • IR NEPHROSTOMY TUBE CHECK/CHANGE/REPOSITION/REINSERTION/UPSIZE  04/28/2022   • IR NEPHROSTOMY TUBE CHECK/CHANGE/REPOSITION/REINSERTION/UPSIZE  05/24/2022   • IR NEPHROSTOMY TUBE CHECK/CHANGE/REPOSITION/REINSERTION/UPSIZE  06/07/2022   • IR NEPHROSTOMY TUBE CHECK/CHANGE/REPOSITION/REINSERTION/UPSIZE  07/28/2022   • IR NEPHROSTOMY TUBE CHECK/CHANGE/REPOSITION/REINSERTION/UPSIZE  11/15/2022   • IR NEPHROSTOMY TUBE CHECK/CHANGE/REPOSITION/REINSERTION/UPSIZE  1/10/2023   • IR NEPHROSTOMY TUBE PLACEMENT  02/25/2022   • IR PORT PLACEMENT  01/17/2022   • IR PORT REMOVAL  1/10/2023   • IR THORACENTESIS  09/02/2022   • IR THORACENTESIS  09/14/2022   • IR THORACENTESIS WITH TUBE PLACEMENT Left     october   • IR TUNNELED CENTRAL LINE PLACEMENT  12/24/2020   • JOINT REPLACEMENT      christofer knees replaced   • NE AMPUTATION METATARSAL W/TOE SINGLE Left 12/21/2020    Procedure: RAY RESECTION FOOT;  Surgeon: Stormy Carmona DPM;  [facility];  Service: Podiatry   • NE AMPUTATION METATARSAL W/TOE SINGLE Left 12/31/2020    Procedure: 5TH MET RESECTION;  Surgeon: Stormy Carmona DPM;  [facility];  Service: Podiatry   • NE CYSTO BLADDER W/URETERAL CATHETERIZATION Right 12/14/2022    Procedure: CYSTOSCOPY WITH RETROGRADE PYELOGRAM and CLOT EVACUATION, RIGHT STENT INSERTION, FULGRATION OF BLEEDING POINTS;  Surgeon: López Mahoney MD;  [facility];  Service: Urology   • NE CYSTO W/IRRIG & EVAC MULTPLE OBSTRUCTING CLOTS N/A 02/10/2021    Procedure: CYSTOSCOPY EVACUATION OF CLOTS, fulguration;  Surgeon: Rufino Lamas MD;  [facility];  Service: Urology   • NE CYSTO W/IRRIG & EVAC MULTPLE OBSTRUCTING CLOTS N/A 10/24/2021    Procedure: CYSTOSCOPY EVACUATION OF CLOT, fulguration of bleeding vessels, right ureter stent placement, retrograde pyelogram;  Surgeon: Marquise James MD;  [facility];  Service: Urology   • NE CYSTO W/REMOVAL OF LESIONS SMALL N/A 11/19/2020    Procedure: CYSTO W/BIOPSIES, transurethral prostate bx;  Surgeon: Shaun Austin MD;  [facility];  Service: Urology   • NE CYSTOURETHROSCOPY W/DEST &/RMVL TUMOR LARGE Bilateral 10/18/2021    Procedure: TRANSURETHRAL RESECTION OF BLADDER TUMOR (TURBT);   Surgeon: Ada Daniel MD;  [facility];  Service: Urology   • NE CYSTOURETHROSCOPY WITH BIOPSY N/A 08/16/2016    Procedure: Trenton Needle;  Surgeon: Shawnee Felton MD;  [facility];  Service: Urology   • NE Ruthn[address]iny El 3RD+ ORD [address] PEL/TR [facility] Left 02/08/2021    Procedure: LEG angiogram, CO2 w/limited contrast with balloon angioplasty postertior tibial artery;  Surgeon: Bree Cedeño MD;  [facility];  Service: Vascular   • ROTATOR CUFF REPAIR     • SMALL INTESTINE SURGERY      Surgery Shaji-en-Y   • SPINAL FUSION      lumbar and cervical fusions   • VAC DRESSING APPLICATION Right     Procedure: APPLICATION VAC DRESSING;  Surgeon: Chet Gillis DPM;  [facility];  Service:    • WOUND DEBRIDEMENT Left 2021    Procedure: FOOT DEBRIDE, [address] OUT w/graft application;  Surgeon: Chet Gillis DPM;  [facility];  Service: Podiatry       FAMILY HISTORY:  Non-contributory    SOCIAL HISTORY:  Social History   Social History     Substance and Sexual Activity   Alcohol Use Not Currently    Comment: occasional in the past     Social History     Substance and Sexual Activity   Drug Use Not Currently   • Types: Marijuana    Comment: quit 2019 had medical marijuana     Social History     Tobacco Use   Smoking Status Former   • Packs/day: 3 00   • Types: Cigarettes   • Start date:    • Quit date:    • Years since quittin 2   • Passive exposure: Past   Smokeless Tobacco Never       ALLERGIES:  Allergies   Allergen Reactions   • Statins Hives and Itching     Includes atorvastatin and simvastatin     • Insulin Lispro Swelling and Edema     " Lower Legs"   • Other Itching, Rash and Other (See Comments)     "EKG Patches"   "blue EKG patches"       MEDICATIONS:  All current active medications have been reviewed      PHYSICAL EXAM:  Temp:  [97 3 °F (36 3 °C)-98 4 °F (36 9 °C)] 98 4 °F (36 9 °C)  HR:  [78-94] 78  Resp:  [14-18] 18  BP: (127-140)/(75-80) 127/75  SpO2:  [98 %-100 %] 98 %  Temp (24hrs), Av 9 °F (36 6 °C), Min:97 3 °F (36 3 °C), Max:98 4 °F (36 9 °C)  Current: Temperature: 98 4 °F (36 9 °C)    Intake/Output Summary (Last 24 hours) at 3/9/2023 0723  Last data filed at 3/9/2023 0600  Gross per 24 hour   Intake 240 ml   Output 1025 ml   Net -785 ml       General Appearance:  Appearing well, nontoxic, and in no distress   Head:  Normocephalic, without obvious abnormality, atraumatic   Eyes:  Conjunctiva pink and sclera anicteric, both eyes   Nose: Nares normal   Throat: Oropharynx moist    Neck: Supple, symmetrical   Back:   Symmetric, bilateral PCNs in place without surrounding erythema/draiange or tenderness   Lungs:   Clear to auscultation bilaterally, respirations unlabored   Chest Wall:  No tenderness or deformity   Heart:  RRR; no murmur, rub or gallop   Abdomen:   Soft, non-tender   Extremities: No cyanosis, clubbing or edema   Skin: No rashes or lesions  No draining wounds noted  Carmine Camden old scar over right knee   Lymph nodes: Cervical, supraclavicular nodes normal   Neurologic: Alert and oriented        LABS, IMAGING, & OTHER STUDIES:  Lab Results:  I have personally reviewed pertinent labs  Results from last 7 days   Lab Units 03/08/23  0444 03/06/23  0524 03/05/23  0620   WBC Thousand/uL 19 25* 15 69* 15 56*   HEMOGLOBIN g/dL 9 4* 8 4* 8 3*   PLATELETS Thousands/uL 298 237 247     Results from last 7 days   Lab Units 03/08/23  0444 03/07/23  0521 03/06/23  1549   SODIUM mmol/L 132* 133* 132*   POTASSIUM mmol/L 5 1 5 2  --    CHLORIDE mmol/L 100 99  --    CO2 mmol/L 24 25  --    BUN mg/dL 45* 47*  --    CREATININE mg/dL 2 47* 2 49*  --    EGFR ml/min/1 73sq m 23 23  --    CALCIUM mg/dL 10 7* 10 3*  --      Results from last 7 days   Lab Units 03/03/23  1702 03/03/23  1646 03/03/23  1534   BLOOD CULTURE   --  No Growth After 5 Days  No Growth After 5 Days  URINE CULTURE  >100,000 cfu/ml  --   --        Imaging Studies:   I have personally reviewed pertinent imaging study reports and images in PACS  CXR 3/5/23:  Similar patchy opacity of the left base compared to the PET CT of 3/1/2023, which may represent atelectasis, tumor, and/or infection/inflammation, or combination thereof        Stable small left pleural effusion compared to the PET CT of 3/1/2023  No pneumothorax      NM PET CT Skull to mid thigh 3/1/23:  VISUALIZED BRAIN:     No acute abnormalities are seen      HEAD/NECK: Suboptimal assessment due to motion      No definite FDG avid lesions      CT images: Stable mild nodularity of thyroid gland without focal FDG uptake      CHEST:     More extensive lobular pleural thickening in the left hemithorax with heterogeneous FDG uptake most suspicious for metastasis  This has progressed from recent CT  Activity is most extensive at the lung base  A posterior lateral focus at the lung base   demonstrates SUV max of 11, new from prior PET/CT        A large heterogeneous soft tissue focus at the left lung base anteriorly demonstrates SUV max 7 5, previously 9 3 but activity is significantly more extensive compared to prior exam   This appears to extend through the chest wall with associated rib   destruction      New left hilar focus demonstrates SUV max of 4 5  This is not well seen on CT may represent fabien metastasis  No suspicious focal FDG uptake in the mediastinal or perihilar region      New FDG uptake in a left axillary lymph node demonstrates SUV maximum 3 4  This measures 8 mm short axis image 70 series 4      New foci of FDG uptake in the left lateral chest wall within the musculature suspicious for intramuscular metastasis  A focus here demonstrates SUV max of 4 7  Suggestion of corresponding soft tissue nodules on limited CT measuring on the order of 1 5   cm image 112 series 4      Previously noted FDG avid right lung nodule is not seen      The previously noted FDG avid lingular nodule is not well delineated from the extensive FDG avid pleural disease      CT images: Volume loss on the left with increased atelectasis in the left lower lobe  Extensive coronary artery calcifications  Implanted device at the left anterior chest wall      ABDOMEN:     Scattered foci of FDG uptake in the liver most compatible with metastasis, new from prior PET/CT  Confluent area of uptake noted in the right lobe of liver laterally, SUV max of 9 6    Corresponding confluent hypodensities suggested here on the low-dose   CT measuring on the order of 7 5 cm in size  Additional smaller foci of uptake identified in the left and right lobes      Scattered FDG avid retroperitoneal lymph nodes identified      Left para-aortic node demonstrates SUV max of 11, previously with mild uptake and SUV max of 2 9  This lymph node measures up to 1 7 cm short axis image 144 series 4      CT images: Status post gastric bypass  Stable bilateral nephrostomy tubes  Right ureteral stent in position  No hydronephrosis  Scattered renal cysts  Nodularity at the bilateral anterior abdominal wall likely related to injections        PELVIS:   A few new FDG avid lymph nodes along the left iliac chain  A left external iliac chain lymph node demonstrates SUV max of 4 1  This measures up to 8 mm short axis image 176 series 4      New small focus of FDG uptake at the base of the penis, SUV max of 8 7  There is a corresponding hypodensity here on CT measuring up to 8 mm in size  This may represent metastasis      Patchy linear radiotracer uptake in the musculature adjacent to the right acetabulum and head, question physiologic      Fairly diffuse bladder wall thickening  Limited evaluation for bladder lesions due to normal excretion of radioactive urine  CT images: Moderate right scrotal hydrocele      OSSEOUS STRUCTURES:  Scattered new FDG avid osseous lesions suspicious for metastasis  Some of these correspond to lytic lesions on CT      Reference lesions as noted below:     New FDG avid lesion at the right humeral head, SUV max of 6  Corresponding small lytic lesion noted on CT  Midsternal lesion demonstrates SUV max of 4 4  L3 lytic lesion anteriorly demonstrates SUV max of 5 5  Left anterior iliac lesion demonstrates SUV max of 5 7  Right posterior iliac lesion demonstrates SUV max of 5 3  Right sacral lesion adjacent max of 4 7        CT images: Anterior cervical fusion  Multilevel degenerative spurring of the spine  Surgical hardware at the right humeral head      IMPRESSION:     1  Overall findings compatible with disease progression  2   More extensive lobular pleural thickening in the left hemithorax with heterogeneous FDG uptake most suspicious for metastasis which has progressed from recent CT  Associated extension into the chest wall anteriorly and laterally  3   New left hilar focus may represent fabien metastasis  No suspicious focal FDG uptake in the mediastinal or perihilar region  4   New FDG uptake in a left axillary lymph node suspicious for metastasis  New foci of FDG uptake in the left lateral chest wall within the musculature suspicious for intramuscular metastasis  5   Scattered foci of FDG uptake in the liver most compatible with metastasis, new from prior PET/CT, noted on recent CTs  Confluent area of uptake noted in the right lobe of liver laterally, measuring on the order of 7 5 cm in size  6   Scattered FDG avid retroperitoneal lymph nodes identified suspicious for metastasis  7   A few new FDG avid lymph nodes along the left iliac chain suspicious for metastasis  8   New small focus of FDG uptake at the base of the penis, metastasis is not excluded  9   Scattered new FDG avid osseous lesions suspicious for metastasis  Some of these correspond to lytic lesions on CT  Other Studies:   I have personally reviewed pertinent reports        Pallavi Bernstein MD  [facility]

## 2023-03-09 NOTE — CONSULTS
Pulmonary Consultation   Zay Castro [de-identified] y o  male MRN: [id number]  Unit/Bed#: E5 -01 Encounter: [id number]      Reason for consultation: Pleural effusion, pain at the area of previous TPC      Requesting physician: Broderick Bustos DO    Impressions:     Left pleural-based tumor   · Known finding from before  · Hx of bladder cancer, metastatic to liver, lung, lymph nodes, scattered osseous lesions including in the chest  · Has been progressing, CT from 1/27/23 showed progression even compared to 10 days prior to that  · PET/CT  From 3/1/23 showed further progression, also possible intramuscular metastasis in the left lateral chest  · Patient is complaining of pain in the left flank and lateral chest area  · Likely pain is in the setting of cancer progression, possibly invasive to muscles and bone of the lateral chest wall      Hx of pleural effusions    · Has a hx of pleural effusions, in the setting of malignancy  · Fluid was exudative, lymphocyte predominant  · Had TPC in the left lateral chest placed in September 2022, removed 2 weeks ago because of decreased and diminished output  · Has been following up with outpatient pulmonology  · Since removal of TPC patient anticipates pain in the left chest      Bladder cancer    · Found in 1994, extensive metastasis  · S/p radiation therapy and chemotherapy  · Hem Onc on board    Further management per primary team and consult teams:  Hyponatremia  CKD stage IV  SIRS  Depression  CAD  Bilateral hydronephrosis s/p bilateral nephrostomy tubes  Anemia  Type 2 diabetes      Recommendations:  · Palliative care consult for controlling symptoms  · Adjusting pain regimen recommended  · Follow up with CT CAP  · Might benefit from palliative radiation for pain management  · Not a great candidate for invasive procedures     History of Present Illness   HPI:  Zay Castro is a [de-identified] y o  male with PMH of extensive bladder cancer with metastasis, depression, CAD, bilateral hydronephrosis, stage 4 ckd, who presented to the hospital because of left flank pain, presumed UTI and has been on Linezolid but then antibiotics were stopped  Patient has a history of recurrent pulmonary effusion, exudative lymphocitic, but no malignant cells on the cytology  He had TPC placed in September 2022, removed around 2 weeks ago because of decreased and diminished output  CT from 1/17/23 noted progression of the left pleural tumor progressing compared to an imaging 10 days prior to that, possibly extending through the chest wall and causing increase in pain  Pulmonology is consulted because of lung findings left pleural based tumor, pain around the prior TPC area and history of pleural effusion  Review of systems:  12 point review of systems was completed and was otherwise negative except as listed in HPI        Historical Information   Past Medical History:   Diagnosis Date   • Anemia     Last assessed: 9/28/17   • Anxiety    • Arteriosclerotic cardiovascular disease     Last assessed: 9/28/17   • Arthritis    • [facility]     bladder- had BCG treatments   • Chronic kidney disease     Stage IV   • CKD (chronic kidney disease) stage 4, GFR 15-29 ml/min ([facility])    • Colon polyp    • Coronary artery disease     7 stents   • Depression    • Diabetes mellitus ([facility])     IDDM   • GERD (gastroesophageal reflux disease)    • Glaucoma    • Hematuria    • History of fusion of cervical spine    • Hyperlipidemia    • Hypertension    • Insomnia     Last assessed: 11/14/12   • Loss of hearing     has hearing aids but usually does not wear them   • [facility] ([facility] )    • Metastatic cancer ([facility] )    • Other seasonal allergic rhinitis     Last assessed: 2/10/16   • PAD (peripheral artery disease) ([facility])    • Shortness of breath     on exertion   • Spinal stenosis of lumbar region    • Transient cerebral ischemia     No Residual   • Uses walker     w/c for longer distances     Past Surgical History: Procedure Laterality Date   • CARDIAC SURGERY      Cath stent placement  Last assessed: 3/9/17  Interventional Catheterization   • CHOLECYSTECTOMY     • COLONOSCOPY     • CYSTOSCOPY      Diagnostic w/biopsy  Murel Needy  Last assessed: 12/1/14   • CYSTOSCOPY N/A 04/12/2022    Procedure: CYSTOSCOPY  Bladder biopsies  ;  Surgeon: Bhumika Baker MD;  [facility];  Service: Urology   • CYSTOSCOPY W/ RETROGRADES Right 03/01/2022    Procedure: CYSTO; stent removal retrograde;  Surgeon: Bhumika Baker MD;  [facility];  Service: Urology   • CYSTOSCOPY W/ URETERAL STENT PLACEMENT Bilateral 10/18/2021    Procedure: bilateral retrogrades, cytology collection;  Surgeon: Bhumika Baker MD;  [facility];  Service: Urology   • CYSTOURETHROSCOPY      w/cautery  Murel Needy   • FL RETROGRADE PYELOGRAM  10/18/2021   • FL RETROGRADE PYELOGRAM  10/24/2021   • FL RETROGRADE PYELOGRAM  12/14/2022   • GASTRIC BYPASS      For morbid obesity w/Shaji-en-Y   Resolved: 11/17/09   • INCISION AND DRAINAGE OF WOUND Right 02/26/2017    Procedure: INCISION AND DRAINAGE (I&D) EXTREMITY WITH APPLICATION OF GRAFT JACKET;  Surgeon: Ahmet Harper DPM;  [facility];  Service:    • INCISION AND DRAINAGE OF WOUND Right 04/25/2017    Procedure: INCISION AND DRAINAGE (I&D) EXTREMITY, APPLICATION OF GRAFT;  Surgeon: Ahmet Harper DPM;  [facility];  Service:    • IR BIOPSY OTHER  07/02/2020   • IR LOWER EXTREMITY ANGIOGRAM  02/08/2021   • IR LOWER EXTREMITY ANGIOGRAM  02/11/2021   • IR NEPHROSTOMY TUBE CHECK/CHANGE/REPOSITION/REINSERTION/UPSIZE  04/28/2022   • IR NEPHROSTOMY TUBE CHECK/CHANGE/REPOSITION/REINSERTION/UPSIZE  05/24/2022   • IR NEPHROSTOMY TUBE CHECK/CHANGE/REPOSITION/REINSERTION/UPSIZE  06/07/2022   • IR NEPHROSTOMY TUBE CHECK/CHANGE/REPOSITION/REINSERTION/UPSIZE  07/28/2022   • IR NEPHROSTOMY TUBE CHECK/CHANGE/REPOSITION/REINSERTION/UPSIZE  11/15/2022   • IR NEPHROSTOMY TUBE CHECK/CHANGE/REPOSITION/REINSERTION/UPSIZE  1/10/2023   • IR NEPHROSTOMY TUBE PLACEMENT  02/25/2022   • IR PORT PLACEMENT  01/17/2022   • IR PORT REMOVAL  1/10/2023   • IR THORACENTESIS  09/02/2022   • IR THORACENTESIS  09/14/2022   • IR THORACENTESIS WITH TUBE PLACEMENT Left     october   • IR TUNNELED CENTRAL LINE PLACEMENT  12/24/2020   • JOINT REPLACEMENT      christofer knees replaced   • IN AMPUTATION METATARSAL W/TOE SINGLE Left 12/21/2020    Procedure: RAY RESECTION FOOT;  Surgeon: Kaci Fuentes DPM;  [facility];  Service: Podiatry   • IN AMPUTATION METATARSAL W/TOE SINGLE Left 12/31/2020    Procedure: 5TH MET RESECTION;  Surgeon: Kaci Fuentes DPM;  [facility];  Service: Podiatry   • IN CYSTO BLADDER W/URETERAL CATHETERIZATION Right 12/14/2022    Procedure: CYSTOSCOPY WITH RETROGRADE PYELOGRAM and CLOT EVACUATION, RIGHT STENT INSERTION, FULGRATION OF BLEEDING POINTS;  Surgeon: Flori Pizano MD;  [facility];  Service: Urology   • IN CYSTO W/IRRIG & EVAC MULTPLE OBSTRUCTING CLOTS N/A 02/10/2021    Procedure: CYSTOSCOPY EVACUATION OF CLOTS, fulguration;  Surgeon: Camilo Lennox, MD;  [facility];  Service: Urology   • IN CYSTO W/IRRIG & EVAC MULTPLE OBSTRUCTING CLOTS N/A 10/24/2021    Procedure: CYSTOSCOPY EVACUATION OF CLOT, fulguration of bleeding vessels, right ureter stent placement, retrograde pyelogram;  Surgeon: Ratna Adorno MD;  [facility];  Service: Urology   • IN CYSTO W/REMOVAL OF LESIONS SMALL N/A 11/19/2020    Procedure: CYSTO W/BIOPSIES, transurethral prostate bx;  Surgeon: Kavin Guy MD;  [facility];  Service: Urology   • IN CYSTOURETHROSCOPY W/DEST &/RMVL TUMOR LARGE Bilateral 10/18/2021    Procedure: TRANSURETHRAL RESECTION OF BLADDER TUMOR (TURBT);   Surgeon: Ron Siu MD;  [facility];  Service: Urology   • IN CYSTOURETHROSCOPY WITH BIOPSY N/A 08/16/2016    Procedure: CYSTOSCOPY WITH BIOPSIES;  Surgeon: Yoan Mcadams MD;  [facility];  Service: Urology   • WV SLCTV CATHJ 3RD+ ORD SLCTV ABDL PEL/LXTR [facility] Left 02/08/2021    Procedure: LEG angiogram, CO2 w/limited contrast with balloon angioplasty postertior tibial artery;  Surgeon: Melissa Mehta MD;  [facility];  Service: Vascular   • ROTATOR CUFF REPAIR     • SMALL INTESTINE SURGERY      Surgery Shaji-en-Y   • SPINAL FUSION      lumbar and cervical fusions   • VAC DRESSING APPLICATION Right 21/26/5170    Procedure: APPLICATION VAC DRESSING;  Surgeon: Christiano Coates DPM;  [facility];  Service:    • WOUND DEBRIDEMENT Left 02/16/2021    Procedure: FOOT DEBRIDE, [address] OUT w/graft application;  Surgeon: Christiano Coates DPM;  [facility];  Service: Podiatry     Family History   Problem Relation Age of Onset   • Diabetes Mother    • Heart disease Mother    • Other Mother         High blood pressure   • Heart disease Father    • Diabetes Sister    • Other Sister         High blood pressure   • Kidney disease Sister    • Heart disease Brother    • Other Brother         High blood pressure       Meds/Allergies   Current Facility-Administered Medications   Medication Dose Route Frequency   • acetaminophen (TYLENOL) tablet 975 mg  975 mg Oral Q8H PRN   • aluminum-magnesium hydroxide-simethicone (MYLANTA) oral suspension 30 mL  30 mL Oral Q6H PRN   • ARIPiprazole (ABILIFY) tablet 2 mg  2 mg Oral Daily   • aspirin (ECOTRIN LOW STRENGTH) EC tablet 81 mg  81 mg Oral Daily   • bimatoprost (LUMIGAN) 0 01 % ophthalmic solution 1 drop  1 drop Both Eyes HS   • docusate sodium (COLACE) capsule 100 mg  100 mg Oral BID   • ezetimibe (ZETIA) tablet 10 mg  10 mg Oral Daily   • gabapentin (NEURONTIN) capsule 300 mg  300 mg Oral HS   • heparin (porcine) subcutaneous injection 5,000 Units  5,000 Units Subcutaneous Q8H [facility]   • lidocaine (LIDODERM) 5 % patch 1 patch  1 patch Topical Daily   • metoprolol tartrate (LOPRESSOR) tablet 25 mg  25 mg Oral Q12H [facility]   • mirtazapine (REMERON) tablet 7 5 mg  7 5 mg Oral HS   • ondansetron (ZOFRAN-ODT) dispersible tablet 4 mg  4 mg Oral 4x Daily   • oxybutynin (DITROPAN) tablet 5 mg  5 mg Oral TID   • oxyCODONE (ROXICODONE) immediate release tablet 10 mg  10 mg Oral Q4H PRN   • oxyCODONE (ROXICODONE) IR tablet 5 mg  5 mg Oral Q4H PRN   • pantoprazole (PROTONIX) EC tablet 40 mg  40 mg Oral Daily Before Breakfast   • polyethylene glycol (MIRALAX) packet 17 g  17 g Oral BID   • simethicone (MYLICON) chewable tablet 80 mg  80 mg Oral 4x Daily PRN   • sodium chloride 0 9 % bolus 500 mL  500 mL Intravenous Once     Medications Prior to Admission   Medication   • acetaminophen (TYLENOL) 325 mg tablet   • aspirin (ECOTRIN LOW STRENGTH) 81 mg EC tablet   • Bimatoprost (LUMIGAN OP)   • docusate sodium (COLACE) 100 mg capsule   • ezetimibe (ZETIA) 10 mg tablet   • gabapentin (NEURONTIN) 300 mg capsule   • oxybutynin (DITROPAN) 5 mg tablet   • senna (SENOKOT) 8 6 MG tablet   • Accu-Chek Softclix Lancets lancets   • ARIPiprazole (ABILIFY) 2 mg tablet   • Azelastine HCl 0 15 % SOLN   • calcitriol (ROCALTROL) 0 25 mcg capsule   • DULoxetine (CYMBALTA) 30 mg delayed release capsule   • Ferrous Sulfate Dried (Feosol) 200 (65 Fe) MG TABS   • fluticasone (FLONASE) 50 mcg/act nasal spray   • furosemide (LASIX) 20 mg tablet   • furosemide (LASIX) 20 mg tablet   • Insulin Pen Needle 31G X 8 MM MISC   • loperamide (IMODIUM) 2 mg capsule   • metoprolol tartrate (LOPRESSOR) 25 mg tablet   • multivitamin (THERAGRAN) TABS   • omeprazole (PriLOSEC) 20 mg delayed release capsule   • oxyCODONE (OXY-IR) 5 MG capsule   • oxyCODONE (ROXICODONE) 10 MG TABS   • polyethylene glycol (MIRALAX) 17 g packet     Allergies   Allergen Reactions   • Statins Hives and Itching     Includes atorvastatin and simvastatin     • Insulin Lispro Swelling and Edema     " Lower Legs"   • Other Itching, Rash and Other (See Comments)     "EKG Patches"   "blue EKG patches" Vitals: Blood pressure 103/66, pulse 104, temperature 98 3 °F (36 8 °C), temperature source Oral, resp  rate 20, height 6' 4" (1 93 m), weight 85 6 kg (188 lb 11 4 oz), SpO2 97 % , On RA, Body mass index is 22 97 kg/m²  Intake/Output Summary (Last 24 hours) at 3/9/2023 1439  Last data filed at 3/9/2023 0952  Gross per 24 hour   Intake 240 ml   Output 985 ml   Net -745 ml       Physical Exam  General: Awake alert and oriented x 3, conversant without conversational dyspnea, NAD, normal affect, in discomfort due to pain  HEENT:  PERRL, Sclera noninjected, nonicteric OU, Nares patent, no nasal flaring, no nasal drainage, Mucous membranes, moist, no oral lesions, normal dentition  NECK: Trachea midline, no accessory muscle use, no stridor, no cervical or supraclavicular adenopathy, JVP not elevated  CARDIAC: Reg, single s1/S2, no m/r/g  PULM: No crackles, normal lung sounds, no wheezes  CHEST: No gross deformities, equal chest expansion on inspiration bilaterally  ABD: Normoactive bowel sounds, soft nontender, nondistended, no rebound, no rigidity, no guarding  EXT: No cyanosis, no clubbing, no edema, normal capillary refill  SKIN:  No rashes, no lesions  NEURO: no focal neurologic deficits, AAOx3, moving all extremities appropriately    Labs: I have personally reviewed pertinent lab results  Results from last 7 days   Lab Units 03/09/23  0645 03/08/23 0444 03/06/23  0524 03/04/23  0617 03/03/23  1534   WBC Thousand/uL 18 24* 19 25* 15 69*   < > 14 54*   HEMOGLOBIN g/dL 8 5* 9 4* 8 4*   < > 9 4*   HEMATOCRIT % 26 7* 29 3* 26 1*   < > 29 4*   PLATELETS Thousands/uL 248 298 237   < > 252   NEUTROS PCT %  --   --   --   --  77*   MONOS PCT %  --   --   --   --  5    < > = values in this interval not displayed        Results from last 7 days   Lab Units 03/09/23  0645 03/08/23  0444 03/07/23  0521   POTASSIUM mmol/L 5 0 5 1 5 2   CHLORIDE mmol/L 100 100 99   CO2 mmol/L 26 24 25   BUN mg/dL 47* 45* 47* CREATININE mg/dL 2 57* 2 47* 2 49*   CALCIUM mg/dL 10 1 10 7* 10 3*   ALK PHOS U/L 114*  --   --    ALT U/L 14  --   --    AST U/L 14  --   --                   Results from last 7 days   Lab Units 03/03/23  1534   LACTIC ACID mmol/L 1 1     0   Lab Value Date/Time    TROPONINI <0 02 10/25/2021 1006    TROPONINI <0 02 08/19/2020 1309    TROPONINI <0 04 07/25/2015 1615       Imaging and other studies: I have personally reviewed pertinent reports  XR chest pa & lateral   Final Result by Giovanni Macdonald MD (03/05 1307)      Similar patchy opacity of the left base compared to the PET CT of 3/1/2023, which may represent atelectasis, tumor, and/or infection/inflammation, or combination thereof  Stable small left pleural effusion compared to the PET CT of 3/1/2023  No pneumothorax  Workstation performed: [id number]         CT chest abdomen pelvis wo contrast    (Results Pending)         Pulmonary function testing:I have personally reviewed pertinent reports  EKG, Pathology, and Other Studies: I have personally reviewed pertinent reports        Code Status: Level 3 - DNAR and DNI

## 2023-03-09 NOTE — PLAN OF CARE
Problem: Potential for Falls  Goal: Patient will remain free of falls  Description: INTERVENTIONS:  - Educate patient/family on patient safety including physical limitations  - Instruct patient to call for assistance with activity   - Consult OT/PT to assist with strengthening/mobility   - Keep Call bell within reach  - Keep bed low and locked with side rails adjusted as appropriate  - Keep care items and personal belongings within reach  - Initiate and maintain comfort rounds  - Make Fall Risk Sign visible to staff  - Apply yellow socks and bracelet for high fall risk patients  - Consider moving patient to room near nurses station  Outcome: Progressing     Problem: Nutrition/Hydration-ADULT  Goal: Nutrient/Hydration intake appropriate for improving, restoring or maintaining nutritional needs  Description: Monitor and assess patient's nutrition/hydration status for malnutrition  Collaborate with interdisciplinary team and initiate plan and interventions as ordered  Monitor patient's weight and dietary intake as ordered or per policy  Utilize nutrition screening tool and intervene as necessary  Determine patient's food preferences and provide high-protein, high-caloric foods as appropriate       INTERVENTIONS:  - Monitor oral intake, urinary output, labs, and treatment plans  - Assess nutrition and hydration status and recommend course of action  - Evaluate amount of meals eaten  - Assist patient with eating if necessary   - Allow adequate time for meals  - Recommend/ encourage appropriate diets, oral nutritional supplements, and vitamin/mineral supplements  - Order, calculate, and assess calorie counts as needed  - Recommend, monitor, and adjust tube feedings and TPN/PPN based on assessed needs  - Assess need for intravenous fluids  - Provide specific nutrition/hydration education as appropriate  - Include patient/family/caregiver in decisions related to nutrition  Outcome: Progressing     Problem: MOBILITY - ADULT  Goal: Maintain or return to baseline ADL function  Description: INTERVENTIONS:  - Educate patient/family on patient safety including physical limitations  - Instruct patient to call for assistance with activity   - Consult OT/PT to assist with strengthening/mobility   - Keep Call bell within reach  - Keep bed low and locked with side rails adjusted as appropriate  - Keep care items and personal belongings within reach  - Initiate and maintain comfort rounds  - Make Fall Risk Sign visible to staff  - Apply yellow socks and bracelet for high fall risk patients  - Consider moving patient to room near nurses station  Outcome: Progressing  Goal: Maintains/Returns to pre admission functional level  Description: INTERVENTIONS:  - Perform BMAT or MOVE assessment daily    - Set and communicate daily mobility goal to care team and patient/family/caregiver  - Collaborate with rehabilitation services on mobility goals if consulted  - Perform Range of Motion  times a day  - Reposition patient every  hours    - Dangle patient  times a day  - Stand patient  times a day  - Ambulate patient  times a day  - Out of bed to chair  times a day   - Out of bed for meals  times a day  - Out of bed for toileting  - Record patient progress and toleration of activity level   Outcome: Progressing     Problem: Prexisting or High Potential for Compromised Skin Integrity  Goal: Skin integrity is maintained or improved  Description: INTERVENTIONS:  - Identify patients at risk for skin breakdown  - Assess and monitor skin integrity  - Assess and monitor nutrition and hydration status  - Monitor labs   - Assess for incontinence   - Turn and reposition patient  - Assist with mobility/ambulation  - Relieve pressure over bony prominences  - Avoid friction and shearing  - Provide appropriate hygiene as needed including keeping skin clean and dry  - Evaluate need for skin moisturizer/barrier cream  - Collaborate with interdisciplinary team   - Patient/family teaching  - Consider wound care consult   Outcome: Progressing     Problem: GENITOURINARY - ADULT  Goal: Maintains or returns to baseline urinary function  Description: INTERVENTIONS:  - Assess urinary function  - Encourage oral fluids to ensure adequate hydration if ordered  - Administer IV fluids as ordered to ensure adequate hydration  - Administer ordered medications as needed  - Offer frequent toileting  - Follow urinary retention protocol if ordered  Outcome: Progressing     Problem: METABOLIC, FLUID AND ELECTROLYTES - ADULT  Goal: Electrolytes maintained within normal limits  Description: INTERVENTIONS:  - Monitor labs and assess patient for signs and symptoms of electrolyte imbalances  - Administer electrolyte replacement as ordered  - Monitor response to electrolyte replacements, including repeat lab results as appropriate  - Instruct patient on fluid and nutrition as appropriate  Outcome: Progressing  Goal: Fluid balance maintained  Description: INTERVENTIONS:  - Monitor labs   - Monitor I/O and WT  - Instruct patient on fluid and nutrition as appropriate  - Assess for signs & symptoms of volume excess or deficit  Outcome: Progressing  Goal: Glucose maintained within target range  Description: INTERVENTIONS:  - Monitor Blood Glucose as ordered  - Assess for signs and symptoms of hyperglycemia and hypoglycemia  - Administer ordered medications to maintain glucose within target range  - Assess nutritional intake and initiate nutrition service referral as needed  Outcome: Progressing     Problem: SKIN/TISSUE INTEGRITY - ADULT  Goal: Skin Integrity remains intact(Skin Breakdown Prevention)  Description: Assess:  -Perform Ortega assessment every shift and prn  -Inspect skin when repositioning, toileting, and assisting with ADLS  -Assess extremities for adequate circulation and sensation     Bed Management:  -Have minimal linens on bed & keep smooth, unwrinkled  -Change linens as needed when moist or perspiring      Activity:  -Encourage activity and walks on unit  -Encourage or provide ROM exercises  -Use appropriate equipment to lift or move patient in bed    Skin Care:  -Avoid use of baby powder, tape, friction and shearing, hot water or constrictive clothing  -Do not massage red bony areas    Outcome: Progressing  Goal: Pressure injury heals and does not worsen  Description: Interventions:  - Implement low air loss mattress or specialty surface (Criteria met)  - Apply silicone foam dressing  - Apply fecal or urinary incontinence containment device   - Utilize friction reducing device or surface for transfers   - Consider nutrition services referral as needed  Outcome: Progressing     Problem: PAIN - ADULT  Goal: Verbalizes/displays adequate comfort level or baseline comfort level  Description: Interventions:  - Encourage patient to monitor pain and request assistance  - Assess pain using appropriate pain scale  - Administer analgesics based on type and severity of pain and evaluate response  - Implement non-pharmacological measures as appropriate and evaluate response  - Consider cultural and social influences on pain and pain management  - Notify physician/advanced practitioner if interventions unsuccessful or patient reports new pain  Outcome: Progressing     Problem: INFECTION - ADULT  Goal: Absence or prevention of progression during hospitalization  Description: INTERVENTIONS:  - Assess and monitor for signs and symptoms of infection  - Monitor lab/diagnostic results  - Monitor all insertion sites, i e  indwelling lines, tubes, and drains  - Monitor endotracheal if appropriate and nasal secretions for changes in amount and color  - [place] appropriate cooling/warming therapies per order  - Administer medications as ordered  - Instruct and encourage patient and family to use good hand hygiene technique  - Identify and instruct in appropriate isolation precautions for identified infection/condition  Outcome: Progressing     Problem: SAFETY ADULT  Goal: Patient will remain free of falls  Description: INTERVENTIONS:  - Educate patient/family on patient safety including physical limitations  - Instruct patient to call for assistance with activity   - Consult OT/PT to assist with strengthening/mobility   - Keep Call bell within reach  - Keep bed low and locked with side rails adjusted as appropriate  - Keep care items and personal belongings within reach  - Initiate and maintain comfort rounds  - Make Fall Risk Sign visible to staff  - Apply yellow socks and bracelet for high fall risk patients  - Consider moving patient to room near nurses station  Outcome: Progressing  Goal: Maintain or return to baseline ADL function  Description: INTERVENTIONS:  - Educate patient/family on patient safety including physical limitations  - Instruct patient to call for assistance with activity   - Consult OT/PT to assist with strengthening/mobility   - Keep Call bell within reach  - Keep bed low and locked with side rails adjusted as appropriate  - Keep care items and personal belongings within reach  - Initiate and maintain comfort rounds  - Make Fall Risk Sign visible to staff  - Apply yellow socks and bracelet for high fall risk patients  - Consider moving patient to room near nurses station  Outcome: Progressing  Goal: Maintains/Returns to pre admission functional level  Description: INTERVENTIONS:  - Perform BMAT or MOVE assessment daily    - Set and communicate daily mobility goal to care team and patient/family/caregiver  - Collaborate with rehabilitation services on mobility goals if consulted  - Perform Range of Motion  times a day  - Reposition patient every  hours    - Dangle patient  times a day  - Stand patient  times a day  - Ambulate patient  times a day  - Out of bed to chair  times a day   - Out of bed for meals times a day  - Out of bed for toileting  - Record patient progress and toleration of activity level   Outcome: Progressing

## 2023-03-09 NOTE — PROGRESS NOTES
NEPHROLOGY PROGRESS NOTE   Nonda Hernandez [de-identified] y o  male MRN: [id number]  Unit/Bed#: E5 -01 Encounter: [id number]      ASSESSMENT & PLAN:  1 hyponatremia, suspected multifactorial, poor oral intake, malignancy, noted patient was previously on Cymbalta but it was discontinued on 3/3  Sodium is stable at 133 today, will give an extra 500 cc of saline over 5 hours  Follow daily labs and keep hold diuretics for now    2 chronic kidney disease stage IV, [facility], baseline creatinine in the mid to high twos, renal function is stable    3 sepsis secondary to VRE UTI, antibiotics as per infectious disease    #4 history of requiring high grade bladder cancer status post multiple resections and intravesical therapy, bilateral hydronephrosis with bilateral PCN  Patient follows with Sherice Perez as an outpatient    Urology and hematology oncology on board        SUBJECTIVE:  Patient seen and examined, denies significant complaints other than feeling tired and having pain on his flank and on his back, denies any nausea, no vomiting, no diarrhea, no chest pain or shortness of breath    OBJECTIVE:  Current Weight: Weight - Scale: 85 6 kg (188 lb 11 4 oz)  Vitals:    03/09/23 0945   BP: 103/66   Pulse: 104   Resp:    Temp:    SpO2: 97%       Intake/Output Summary (Last 24 hours) at 3/9/2023 1232  Last data filed at 3/9/2023 2013  Gross per 24 hour   Intake 360 ml   Output 985 ml   Net -625 ml       General: conscious, cooperative, in not acute distress  Eyes: conjunctivae pink, anicteric sclerae  ENT: lips and mucous membranes mildly dry  Neck: supple, no JVD  Chest: clear breath sounds bilateral, no crackles, ronchus or wheezings  CVS: distinct S1 & S2, normal rate, regular rhythm  Abdomen: soft, non-tender, non-distended, normoactive bowel sounds bilateral PCN in place  Extremities: minimal edema of both legs  Skin: no rash  Neuro: awake, alert, oriented        Medications:    Current Facility-Administered Medications:   •  acetaminophen (TYLENOL) tablet 975 mg, 975 mg, Oral, Q8H PRN, Georgina Garcia PA-C, 975 mg at 03/09/23 4345  •  aluminum-magnesium hydroxide-simethicone (MYLANTA) oral suspension 30 mL, 30 mL, Oral, Q6H PRN, Lexi Crhis, DO  •  ARIPiprazole (ABILIFY) tablet 2 mg, 2 mg, Oral, Daily, Piyush Pires, DO, 2 mg at 03/09/23 0946  •  aspirin (ECOTRIN LOW STRENGTH) EC tablet 81 mg, 81 mg, Oral, Daily, Lexi Chris, DO, 81 mg at 03/09/23 0946  •  bimatoprost (LUMIGAN) 0 01 % ophthalmic solution 1 drop, 1 drop, Both Eyes, HS, Piyush Pires, DO, 1 drop at 03/08/23 2117  •  docusate sodium (COLACE) capsule 100 mg, 100 mg, Oral, BID, Lexi Chris, DO, 100 mg at 03/09/23 5043  •  ezetimibe (ZETIA) tablet 10 mg, 10 mg, Oral, Daily, Lexi Chris, DO, 10 mg at 03/09/23 2805  •  gabapentin (NEURONTIN) capsule 300 mg, 300 mg, Oral, HS, Piyush Pires, DO, 300 mg at 03/08/23 2117  •  heparin (porcine) subcutaneous injection 5,000 Units, 5,000 Units, Subcutaneous, Q8H [facility], 5,000 Units at 03/09/23 0410 **AND** [CANCELED] Platelet count, , , Once, Piyush Pires, DO  •  lidocaine (LIDODERM) 5 % patch 1 patch, 1 patch, Topical, Daily, Shari Mahajan PA-C, 1 patch at 03/07/23 0822  •  metoprolol tartrate (LOPRESSOR) tablet 25 mg, 25 mg, Oral, Q12H [facility], Piyush Pires, DO, 25 mg at 03/08/23 2117  •  mirtazapine (REMERON) tablet 7 5 mg, 7 5 mg, Oral, HS, EVANGELINA Ramos  •  ondansetron (ZOFRAN-ODT) dispersible tablet 4 mg, 4 mg, Oral, 4x Daily, Fabiano Villanueva DO, 4 mg at 03/09/23 0946  •  oxybutynin (DITROPAN) tablet 5 mg, 5 mg, Oral, TID, Lexi Perez DO, 5 mg at 03/09/23 0767  •  oxyCODONE (ROXICODONE) immediate release tablet 10 mg, 10 mg, Oral, Q4H PRN, Trice Chan DO  •  oxyCODONE (ROXICODONE) IR tablet 5 mg, 5 mg, Oral, Q4H PRN, Ashley Salinas PA-C, 5 mg at 03/09/23 0410  •  pantoprazole (PROTONIX) EC tablet 40 mg, 40 mg, Oral, Daily Before Breakfast, Piyush Rodriguez, , 40 mg at 03/09/23 0410  •  polyethylene glycol (MIRALAX) packet 17 g, 17 g, Oral, BID, Perico Diaz PA-C, 17 g at 03/09/23 0944  •  simethicone (MYLICON) chewable tablet 80 mg, 80 mg, Oral, 4x Daily PRN, Nancy Miller DO  •  sodium chloride 0 9 % bolus 500 mL, 500 mL, Intravenous, Once, Jamir Mcrae MD    Invasive Devices:        Lab Results:   Results from last 7 days   Lab Units 03/09/23  0645 03/08/23  0444 03/07/23  0521 03/06/23  1549 03/06/23  0524   WBC Thousand/uL 18 24* 19 25*  --   --  15 69*   HEMOGLOBIN g/dL 8 5* 9 4*  --   --  8 4*   HEMATOCRIT % 26 7* 29 3*  --   --  26 1*   PLATELETS Thousands/uL 248 298  --   --  237   SODIUM mmol/L 133* 132* 133*   < > 132*   POTASSIUM mmol/L 5 0 5 1 5 2  --  5 1   CHLORIDE mmol/L 100 100 99  --  102   CO2 mmol/L 26 24 25  --  22   BUN mg/dL 47* 45* 47*  --  46*   CREATININE mg/dL 2 57* 2 47* 2 49*  --  2 37*   CALCIUM mg/dL 10 1 10 7* 10 3*  --  10 0   ALK PHOS U/L 114*  --   --   --   --    ALT U/L 14  --   --   --   --    AST U/L 14  --   --   --   --     < > = values in this interval not displayed  Previous work up:  Urine osmolality 369  Urine sodium 60      Portions of the record may have been created with voice recognition software  Occasional wrong word or "sound a like" substitutions may have occurred due to the inherent limitations of voice recognition software  Read the chart carefully and recognize, using context, where substitutions have occurred  If you have any questions, please contact the dictating provider

## 2023-03-09 NOTE — ASSESSMENT & PLAN NOTE
· SIRS with pyuria on admission secondary to VRE based on outside urine culture on 2/27/2023  · Day 7 of linezolid however after formal evaluation with ID, antibiotics can be discontinued as there is no true UTI  · Blood cultures negative; repeated urine cultures on admission mixed contaminants  · Patient is having leukocytosis which after multidisciplinary consultation with ID and oncology no evidence of acute infection is found  · The patient has widespread metastasis and should go to [facility] appointment today      Results from last 7 days   Lab Units 03/09/23  0645 03/08/23  0444 03/06/23  0524 03/05/23  0620 03/04/23  0617   WBC Thousand/uL 18 24* 19 25* 15 69* 15 56* 15 79*

## 2023-03-09 NOTE — ASSESSMENT & PLAN NOTE
· Invasive carcinoma of bladder known to urology service  · No evidence of bleeding this admission  · Has an appointment with Yosvany Izquierdo today  · Most recent PET scan demonstrates advancement of metastatic disease

## 2023-03-10 ENCOUNTER — HOME CARE VISIT (OUTPATIENT)
Dept: HOME HEALTH SERVICES | Facility: HOME HEALTHCARE | Age: 78
End: 2023-03-10

## 2023-03-10 LAB
ANION GAP SERPL CALCULATED.3IONS-SCNC: 7 MMOL/L (ref 4–13)
BUN SERPL-MCNC: 44 MG/DL (ref 5–25)
CALCIUM SERPL-MCNC: 10.5 MG/DL (ref 8.4–10.2)
CHLORIDE SERPL-SCNC: 100 MMOL/L (ref 96–108)
CO2 SERPL-SCNC: 27 MMOL/L (ref 21–32)
CREAT SERPL-MCNC: 2.55 MG/DL (ref 0.6–1.3)
GFR SERPL CREATININE-BSD FRML MDRD: 22 ML/MIN/1.73SQ M
GLUCOSE SERPL-MCNC: 176 MG/DL (ref 65–140)
GLUCOSE SERPL-MCNC: 83 MG/DL (ref 65–140)
GLUCOSE SERPL-MCNC: 85 MG/DL (ref 65–140)
GLUCOSE SERPL-MCNC: 95 MG/DL (ref 65–140)
GLUCOSE SERPL-MCNC: 97 MG/DL (ref 65–140)
POTASSIUM SERPL-SCNC: 4.7 MMOL/L (ref 3.5–5.3)
SODIUM SERPL-SCNC: 134 MMOL/L (ref 135–147)

## 2023-03-10 RX ORDER — OXYCODONE HYDROCHLORIDE 5 MG/1
5 TABLET ORAL EVERY 4 HOURS PRN
Status: DISCONTINUED | OUTPATIENT
Start: 2023-03-10 | End: 2023-03-10

## 2023-03-10 RX ORDER — OXYCODONE HYDROCHLORIDE 10 MG/1
10 TABLET ORAL EVERY 4 HOURS PRN
Status: DISCONTINUED | OUTPATIENT
Start: 2023-03-10 | End: 2023-03-10

## 2023-03-10 RX ORDER — OXYCODONE HYDROCHLORIDE 10 MG/1
10 TABLET ORAL EVERY 4 HOURS PRN
Status: DISCONTINUED | OUTPATIENT
Start: 2023-03-10 | End: 2023-03-13 | Stop reason: HOSPADM

## 2023-03-10 RX ORDER — HYDROMORPHONE HCL/PF 1 MG/ML
1 SYRINGE (ML) INJECTION EVERY 4 HOURS PRN
Status: DISCONTINUED | OUTPATIENT
Start: 2023-03-10 | End: 2023-03-13

## 2023-03-10 RX ADMIN — BIMATOPROST 1 DROP: 0.1 SOLUTION/ DROPS OPHTHALMIC at 21:33

## 2023-03-10 RX ADMIN — DOCUSATE SODIUM 100 MG: 100 CAPSULE, LIQUID FILLED ORAL at 10:06

## 2023-03-10 RX ADMIN — HYDROMORPHONE HYDROCHLORIDE 1 MG: 1 INJECTION, SOLUTION INTRAMUSCULAR; INTRAVENOUS; SUBCUTANEOUS at 21:37

## 2023-03-10 RX ADMIN — ASPIRIN 81 MG: 81 TABLET, COATED ORAL at 10:06

## 2023-03-10 RX ADMIN — ONDANSETRON 4 MG: 4 TABLET, ORALLY DISINTEGRATING ORAL at 21:36

## 2023-03-10 RX ADMIN — ARIPIPRAZOLE 2 MG: 2 TABLET ORAL at 10:06

## 2023-03-10 RX ADMIN — OXYBUTYNIN CHLORIDE 5 MG: 5 TABLET ORAL at 10:05

## 2023-03-10 RX ADMIN — OXYCODONE HYDROCHLORIDE 10 MG: 10 TABLET ORAL at 01:00

## 2023-03-10 RX ADMIN — MIRTAZAPINE 7.5 MG: 15 TABLET, FILM COATED ORAL at 21:39

## 2023-03-10 RX ADMIN — ONDANSETRON 4 MG: 4 TABLET, ORALLY DISINTEGRATING ORAL at 18:01

## 2023-03-10 RX ADMIN — ONDANSETRON 4 MG: 4 TABLET, ORALLY DISINTEGRATING ORAL at 13:31

## 2023-03-10 RX ADMIN — POLYETHYLENE GLYCOL 3350 17 G: 17 POWDER, FOR SOLUTION ORAL at 21:39

## 2023-03-10 RX ADMIN — ONDANSETRON 4 MG: 4 TABLET, ORALLY DISINTEGRATING ORAL at 10:06

## 2023-03-10 RX ADMIN — OXYCODONE HYDROCHLORIDE 10 MG: 10 TABLET ORAL at 18:00

## 2023-03-10 RX ADMIN — DOCUSATE SODIUM 100 MG: 100 CAPSULE, LIQUID FILLED ORAL at 18:00

## 2023-03-10 RX ADMIN — PANTOPRAZOLE SODIUM 40 MG: 40 TABLET, DELAYED RELEASE ORAL at 06:29

## 2023-03-10 RX ADMIN — METOPROLOL TARTRATE 25 MG: 25 TABLET, FILM COATED ORAL at 21:36

## 2023-03-10 RX ADMIN — OXYBUTYNIN CHLORIDE 5 MG: 5 TABLET ORAL at 16:45

## 2023-03-10 RX ADMIN — METOPROLOL TARTRATE 25 MG: 25 TABLET, FILM COATED ORAL at 10:06

## 2023-03-10 RX ADMIN — HEPARIN SODIUM 5000 UNITS: 5000 INJECTION INTRAVENOUS; SUBCUTANEOUS at 06:29

## 2023-03-10 RX ADMIN — OXYCODONE HYDROCHLORIDE 10 MG: 10 TABLET ORAL at 10:06

## 2023-03-10 RX ADMIN — EZETIMIBE 10 MG: 10 TABLET ORAL at 10:06

## 2023-03-10 RX ADMIN — POLYETHYLENE GLYCOL 3350 17 G: 17 POWDER, FOR SOLUTION ORAL at 10:06

## 2023-03-10 RX ADMIN — OXYBUTYNIN CHLORIDE 5 MG: 5 TABLET ORAL at 21:37

## 2023-03-10 RX ADMIN — HEPARIN SODIUM 5000 UNITS: 5000 INJECTION INTRAVENOUS; SUBCUTANEOUS at 21:34

## 2023-03-10 RX ADMIN — LIDOCAINE 5% 1 PATCH: 700 PATCH TOPICAL at 10:14

## 2023-03-10 RX ADMIN — HEPARIN SODIUM 5000 UNITS: 5000 INJECTION INTRAVENOUS; SUBCUTANEOUS at 13:31

## 2023-03-10 RX ADMIN — GABAPENTIN 300 MG: 300 CAPSULE ORAL at 21:34

## 2023-03-10 NOTE — ASSESSMENT & PLAN NOTE
Lab Results   Component Value Date    HGBA1C 6 7 (H) 03/03/2023     Recent Labs     03/09/23  1641 03/09/23  2120 03/10/23  0652 03/10/23  1131   POCGLU 109 115 85 95     · Currently on low-dose glargine with sliding scale

## 2023-03-10 NOTE — ASSESSMENT & PLAN NOTE
· SIRS with pyuria on admission secondary to VRE based on outside urine culture on 2/27/2023  · Completed course of linezolid per ID  · Blood cultures negative; repeated urine cultures on admission mixed contaminants  · Patient is having leukocytosis which after multidisciplinary consultation with ID and oncology no evidence of acute infection is found  · The patient has widespread metastasis and has opted for hospice/comfort care    Hospice consulted    Results from last 7 days   Lab Units 03/09/23  0645 03/08/23  0444 03/06/23  0524 03/05/23  0620 03/04/23  0617   WBC Thousand/uL 18 24* 19 25* 15 69* 15 56* 15 79*

## 2023-03-10 NOTE — CASE MANAGEMENT
Case Management Discharge Planning Note    Patient name Flordia Barthel  [facility]  /E5 -[id number]-* MRN [id number]  : 1945 Date 3/10/2023       Current Admission Date: 3/3/2023  Current Admission Diagnosis:SIRS (systemic inflammatory response syndrome) [facility])   Patient Active Problem List    Diagnosis Date Noted   • Depression 2023   • Hyponatremia 2023   • Moderate protein-calorie malnutrition ([facility] ) 2023   • SIRS (systemic inflammatory response syndrome) ([facility] ) 2023   • CAD (coronary artery disease) 2023   • Severe protein-calorie malnutrition ([facility] ) 2023   • History of pleural effusion 2023   • Nephrostomy status ([facility] ) 2023   • Acute on chronic blood loss anemia 2023   • Bacteremia due to Enterobacter species 2023   • Elevated brain natriuretic peptide (BNP) level 2023   • Ambulatory dysfunction 2023   • Influenza A 2023   • Elevated troponin 2023   • Right sided weakness 2022   • Stroke-like symptoms 2022   • Symptomatic hypotension 2022   • Insomnia 2022   • Right wrist drop 2022   • Chronic pleural effusion 2022   • Urinary tract infection 2022   • SOB (shortness of breath) 2022   • History of tobacco use disorder 2022   • Retroperitoneal lymphadenopathy 2022   • Pulmonary nodules 2022   • Syncope and collapse 2022   • Depression with suicidal ideation 2022   • Cancer related pain 2022   • Bilateral hydronephrosis 04/10/2022   • Stage 4 chronic kidney disease ([address] ) 2022   • Fall 2022   • Hyperkalemia 2022   • Retained ureteral stent    • 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[facility] ) 2022   • Embolism and 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 ([facility] ) 2022   • Abnormal CT scan, bladder 2022   • Port-A-Cath in place 2022   • Continuous opioid dependence ([facility] ) 2021   • Hematuria 10/24/2021   • Acute urinary retention 10/21/2021   • Chronic pain syndrome 05/17/2021   • Lumbar spondylosis    • [facility] bronchitis ([facility] ) 03/02/2021   • Moderate major depression, single episode ([facility] ) 03/02/2021   • Thrombocytopenia ([facility] ) 03/02/2021   • 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 03/02/2021   • Gross hematuria 02/09/2021   • PAD (peripheral artery disease) ([facility])    • Left carotid bruit    • Anemia of chronic disease 12/19/2020   • Preoperative clearance 12/19/2020   • Anemia 10/10/2020   • Diabetic ulcer of left foot associated with type 2 diabetes mellitus, 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 ([facility] ) 10/08/2020   • Acute kidney injury superimposed on CKD ([place] [address] )    • Dysuria 08/17/2020   • Diabetic polyneuropathy associated with type 2 diabetes mellitus ([place] [address] ) 07/16/2020   • Abnormal MRI, lumbar spine 06/29/2020   • Malignant neoplasm of anterior wall of urinary bladder ([place] [address] )    • Secondary renal hyperparathyroidism ([facility] ) 02/12/2020   • Preop examination 04/16/2019   • Superficial phlebitis 03/07/2019   • Arteriosclerosis of artery of extremity ([place] [address] ) 02/22/2019   • Stable angina pectoris ([place] [address] ) 02/22/2019   • Herpes zoster without complication 15/54/7776   • Localized edema 02/22/2019   • Back pain 01/31/2019   • Degeneration of lumbar intervertebral disc 12/03/2018   • Primary osteoarthritis of left knee 03/16/2018   • Bladder carcinoma ([facility] ) 08/16/2016   • Presence of stent in coronary artery 08/16/2016   • Hypertension with renal disease 10/29/2015   • Hyperlipidemia 10/29/2015   • Cystitis due to intravesical BCG administration 09/24/2015   • Coronary artery disease of native artery of native heart with stable angina pectoris ([facility] ) 05/19/2011   • Type 2 diabetes mellitus, with long-term current use of insulin ([place] [address] ) 01/09/2004      LOS (days): 7  Geometric Mean LOS (GMLOS) (days): 3 50  Days to GMLOS:-3 3     OBJECTIVE:  Risk of Unplanned Readmission Score: 66 22         Current admission status: Inpatient   Preferred Pharmacy:   [facility] [address], [address]   [place] [address]  Phone: [phone] Fax: [address], [address] [facility]  [address] [place] [address]  Phone: [phone] Fax: [phone]    Primary Care Provider: Zachariah Ferro MD    Primary Insurance: MEDICARE  Secondary Insurance: BLUE CROSS    DISCHARGE DETAILS:    Discharge planning discussed with[de-identified] Patient  Freedom of Choice: Yes  Comments - Freedom of Choice: Pt is asking to discharge with Hospice  Were Treatment Team discharge recommendations reviewed with patient/caregiver?: Yes  Did patient/caregiver verbalize understanding of patient care needs?: Yes  Were patient/caregiver advised of the risks associated with not following Treatment Team discharge recommendations?: Yes    Requested [address]         Is the patient interested in [address] at discharge?: No  DME Referral Provided  Referral made for DME?: No    Discharge Destination Plan[de-identified] Hospice  Transport at Discharge : Automobile  Transfer Mode: Angelica Gilmore by: Family member    Additional Comments: CM met with Pt and SLIM present as well  Pt has requested Home Hospice  Pts request verified this encounter  CM has Pts permission to consult Hospice  Referral sent to [facility]  Liaison met w/ Pt and reported back to CM  Pt will speak with his spouse tonight to inform his preference to discharge home w/ hospice  CM will update Liaison of expected discharge date  Hospice would plan a same day or next day admission to the service  Pt will not likely need equipment prior to arriving home  CM anticipates Pts discharge Sunday v Saturday  CM remains available to follow through discharge

## 2023-03-10 NOTE — ASSESSMENT & PLAN NOTE
· Uncontrolled pain due to bony metastasis    Increase oxycodone to 10 and 15 mg for moderate/severe pain and add IV Dilaudid for breakthrough pain

## 2023-03-10 NOTE — ACP (ADVANCE CARE PLANNING)
Advanced Care Planning Progress Note    Summary   · Aggressive and advanced metastatic colon cancer  · Patient having a lot of bone pain  Hoping for better pain control  Increasing oxycodone to 10 and 15 mg for moderate and severe pain  · Consultation to hospice as patient wishes to be home     Serious Illness Conversation    1  What is your understanding now of where you are with your illness? Prognostic Understanding: Understands prognosis     2  How much information about what is likely to be ahead with your illness would you like to have? Information: patient wants to be fully informed     3  What did you (clinician) communicate to the patient? Time and function  Patient wishes to have as much quality as possible      4  If your health situation worsens, what are your most important goals? Goals: be at home, be physically comfortable     5  What are the biggest fears and worries about the future and your health? Fears/Worries: pain     6  What abilities are so critical to your life that you cannot imagine living without them? Unacceptable Function: being in chronic severe pain     7  What gives you strength as you think about the future with your illness? Good family support     8  If you become sicker, how much are you willing to go through for the possibility of gaining more time? Undergo aggressive test and/or procedures: No   9  How much does your proxy and family know about your priorities and wishes? Discussion Discussion: wants clinician to talk with family  Granddaughter has been main contact        How does this plan sound to you? I will do everything I can to help you through this          Advanced directives

## 2023-03-10 NOTE — PLAN OF CARE
Problem: Potential for Falls  Goal: Patient will remain free of falls  Description: INTERVENTIONS:  - Educate patient/family on patient safety including physical limitations  - Instruct patient to call for assistance with activity   - Consult OT/PT to assist with strengthening/mobility   - Keep Call bell within reach  - Keep bed low and locked with side rails adjusted as appropriate  - Keep care items and personal belongings within reach  - Initiate and maintain comfort rounds  - Make Fall Risk Sign visible to staff  - Apply yellow socks and bracelet for high fall risk patients  - Consider moving patient to room near nurses station  Outcome: Progressing     Problem: Nutrition/Hydration-ADULT  Goal: Nutrient/Hydration intake appropriate for improving, restoring or maintaining nutritional needs  Description: Monitor and assess patient's nutrition/hydration status for malnutrition  Collaborate with interdisciplinary team and initiate plan and interventions as ordered  Monitor patient's weight and dietary intake as ordered or per policy  Utilize nutrition screening tool and intervene as necessary  Determine patient's food preferences and provide high-protein, high-caloric foods as appropriate       INTERVENTIONS:  - Monitor oral intake, urinary output, labs, and treatment plans  - Assess nutrition and hydration status and recommend course of action  - Evaluate amount of meals eaten  - Assist patient with eating if necessary   - Allow adequate time for meals  - Recommend/ encourage appropriate diets, oral nutritional supplements, and vitamin/mineral supplements  - Order, calculate, and assess calorie counts as needed  - Recommend, monitor, and adjust tube feedings and TPN/PPN based on assessed needs  - Assess need for intravenous fluids  - Provide specific nutrition/hydration education as appropriate  - Include patient/family/caregiver in decisions related to nutrition  Outcome: Progressing     Problem: MOBILITY - ADULT  Goal: Maintain or return to baseline ADL function  Description: INTERVENTIONS:  - Educate patient/family on patient safety including physical limitations  - Instruct patient to call for assistance with activity   - Consult OT/PT to assist with strengthening/mobility   - Keep Call bell within reach  - Keep bed low and locked with side rails adjusted as appropriate  - Keep care items and personal belongings within reach  - Initiate and maintain comfort rounds  - Make Fall Risk Sign visible to staff  - Apply yellow socks and bracelet for high fall risk patients  - Consider moving patient to room near nurses station  Outcome: Progressing  Goal: Maintains/Returns to pre admission functional level  Description: INTERVENTIONS:  - Perform BMAT or MOVE assessment daily    - Set and communicate daily mobility goal to care team and patient/family/caregiver     - Collaborate with rehabilitation services on mobility goals if consulted  - Out of bed for toileting  - Record patient progress and toleration of activity level   Outcome: Progressing     Problem: Prexisting or High Potential for Compromised Skin Integrity  Goal: Skin integrity is maintained or improved  Description: INTERVENTIONS:  - Identify patients at risk for skin breakdown  - Assess and monitor skin integrity  - Assess and monitor nutrition and hydration status  - Monitor labs   - Assess for incontinence   - Turn and reposition patient  - Assist with mobility/ambulation  - Relieve pressure over bony prominences  - Avoid friction and shearing  - Provide appropriate hygiene as needed including keeping skin clean and dry  - Evaluate need for skin moisturizer/barrier cream  - Collaborate with interdisciplinary team   - Patient/family teaching  - Consider wound care consult   Outcome: Progressing     Problem: GENITOURINARY - ADULT  Goal: Maintains or returns to baseline urinary function  Description: INTERVENTIONS:  - Assess urinary function  - Encourage oral fluids to ensure adequate hydration if ordered  - Administer IV fluids as ordered to ensure adequate hydration  - Administer ordered medications as needed  - Offer frequent toileting  - Follow urinary retention protocol if ordered  Outcome: Progressing     Problem: METABOLIC, FLUID AND ELECTROLYTES - ADULT  Goal: Electrolytes maintained within normal limits  Description: INTERVENTIONS:  - Monitor labs and assess patient for signs and symptoms of electrolyte imbalances  - Administer electrolyte replacement as ordered  - Monitor response to electrolyte replacements, including repeat lab results as appropriate  - Instruct patient on fluid and nutrition as appropriate  Outcome: Progressing  Goal: Fluid balance maintained  Description: INTERVENTIONS:  - Monitor labs   - Monitor I/O and WT  - Instruct patient on fluid and nutrition as appropriate  - Assess for signs & symptoms of volume excess or deficit  Outcome: Progressing  Goal: Glucose maintained within target range  Description: INTERVENTIONS:  - Monitor Blood Glucose as ordered  - Assess for signs and symptoms of hyperglycemia and hypoglycemia  - Administer ordered medications to maintain glucose within target range  - Assess nutritional intake and initiate nutrition service referral as needed  Outcome: Progressing     Problem: SKIN/TISSUE INTEGRITY - ADULT  Goal: Skin Integrity remains intact(Skin Breakdown Prevention)  Description: Assess:  -Perform Ortega assessment every shift and prn  -Inspect skin when repositioning, toileting, and assisting with ADLS  -Assess extremities for adequate circulation and sensation     Bed Management:  -Have minimal linens on bed & keep smooth, unwrinkled  -Change linens as needed when moist or perspiring      Activity:  -Encourage activity and walks on unit  -Encourage or provide ROM exercises  -Use appropriate equipment to lift or move patient in bed    Skin Care:  -Avoid use of baby powder, tape, friction and shearing, hot water or constrictive clothing  -Do not massage red bony areas    Outcome: Progressing  Goal: Pressure injury heals and does not worsen  Description: Interventions:  - Implement low air loss mattress or specialty surface (Criteria met)  - Apply silicone foam dressing  - Apply fecal or urinary incontinence containment device   - Utilize friction reducing device or surface for transfers   - Consider nutrition services referral as needed  Outcome: Progressing     Problem: PAIN - ADULT  Goal: Verbalizes/displays adequate comfort level or baseline comfort level  Description: Interventions:  - Encourage patient to monitor pain and request assistance  - Assess pain using appropriate pain scale  - Administer analgesics based on type and severity of pain and evaluate response  - Implement non-pharmacological measures as appropriate and evaluate response  - Consider cultural and social influences on pain and pain management  - Notify physician/advanced practitioner if interventions unsuccessful or patient reports new pain  Outcome: Progressing     Problem: INFECTION - ADULT  Goal: Absence or prevention of progression during hospitalization  Description: INTERVENTIONS:  - Assess and monitor for signs and symptoms of infection  - Monitor lab/diagnostic results  - Monitor all insertion sites, i e  indwelling lines, tubes, and drains  - Monitor endotracheal if appropriate and nasal secretions for changes in amount and color  - [place] appropriate cooling/warming therapies per order  - Administer medications as ordered  - Instruct and encourage patient and family to use good hand hygiene technique  - Identify and instruct in appropriate isolation precautions for identified infection/condition  Outcome: Progressing     Problem: SAFETY ADULT  Goal: Patient will remain free of falls  Description: INTERVENTIONS:  - Educate patient/family on patient safety including physical limitations  - Instruct patient to call for assistance with activity   - Consult OT/PT to assist with strengthening/mobility   - Keep Call bell within reach  - Keep bed low and locked with side rails adjusted as appropriate  - Keep care items and personal belongings within reach  - Initiate and maintain comfort rounds  - Make Fall Risk Sign visible to staff  - Apply yellow socks and bracelet for high fall risk patients  - Consider moving patient to room near nurses station  Outcome: Progressing  Goal: Maintain or return to baseline ADL function  Description: INTERVENTIONS:  - Educate patient/family on patient safety including physical limitations  - Instruct patient to call for assistance with activity   - Consult OT/PT to assist with strengthening/mobility   - Keep Call bell within reach  - Keep bed low and locked with side rails adjusted as appropriate  - Keep care items and personal belongings within reach  - Initiate and maintain comfort rounds  - Make Fall Risk Sign visible to staff  - Apply yellow socks and bracelet for high fall risk patients  - Consider moving patient to room near nurses station  Outcome: Progressing  Goal: Maintains/Returns to pre admission functional level  Description: INTERVENTIONS:  - Perform BMAT or MOVE assessment daily    - Set and communicate daily mobility goal to care team and patient/family/caregiver     - Collaborate with rehabilitation services on mobility goals if consulted  - Out of bed for toileting  - Record patient progress and toleration of activity level   Outcome: Progressing

## 2023-03-10 NOTE — PROGRESS NOTES
Shannan Griffin  Progress Note - Nicole Bernstein 1945, [de-identified] y o  male MRN: [id number]  Unit/Bed#: E5 -01 Encounter: [id number]  Primary Care Provider: Jessee Barber MD   Date and time admitted to hospital: 3/3/2023  2:31 PM    * SIRS (systemic inflammatory response syndrome) ([facility] [address] )  Assessment & Plan  · SIRS with pyuria on admission secondary to VRE based on outside urine culture on 2/27/2023  · Completed course of linezolid per ID  · Blood cultures negative; repeated urine cultures on admission mixed contaminants  · Patient is having leukocytosis which after multidisciplinary consultation with ID and oncology no evidence of acute infection is found  · The patient has widespread metastasis and has opted for hospice/comfort care  Hospice consulted    Results from last 7 days   Lab Units 03/09/23  0645 03/08/23  0444 03/06/23  0524 03/05/23  0620 03/04/23  0617   WBC Thousand/uL 18 24* 19 25* 15 69* 15 56* 15 79*       Depression  Assessment & Plan  · Stable continue abilify   · Duloxetine discontinued due to concerns for interaction with linezolid  Hyponatremia  Assessment & Plan  · Nonspecific hyponatremia likely secondary to poor intake  Duloxetine discontinued  · Urology consulted for assistance  Started on gentle IV fluids    Results from last 7 days   Lab Units 03/10/23  0643 03/09/23  0645 03/08/23  0444 03/07/23  0521   SODIUM mmol/L 134* 133* 132* 133*       Moderate protein-calorie malnutrition (HCC)  Assessment & Plan  Malnutrition Findings:   Adult Malnutrition type: Chronic illness  Adult Degree of Malnutrition: Malnutrition of moderate degree  Malnutrition Characteristics: Inadequate energy, Weight loss, Fluid accumulation  360 Statement: PCM r/t cancer with chemoradiation AEB, a 6 6% unintended wt loss from 01/02/23 to present and a 12 8% unintended wt loss from 08/26/22 to present and < 75% energy intake compared to estimated energy needs for > 1 month    BMI Findings: Body mass index is 22 89 kg/m²  CAD (coronary artery disease)  Assessment & Plan  · CAD with history of PCI  No chest pain  Cancer related pain  Assessment & Plan  · Uncontrolled pain due to bony metastasis  Increase oxycodone to 10 and 15 mg for moderate/severe pain and add IV Dilaudid for breakthrough pain    Bilateral hydronephrosis  Assessment & Plan  · Bilateral hydronephrosis secondary to bladder cancer with bilateral nephrostomy tubes in place  · No new urology recommendations at this time    Stage 4 chronic kidney disease [facility])  Assessment & Plan  · Stable at baseline    Results from last 7 days   Lab Units 03/10/23  0643 03/09/23  0645 03/08/23  0444 03/07/23  0521 03/06/23  0524 03/05/23  0620 03/04/23  0617 03/03/23  1754   BUN mg/dL 44* 47* 45* 47* 46* 47* 48* 50*   CREATININE mg/dL 2 55* 2 57* 2 47* 2 49* 2 37* 2 40* 2 39* 2 50*   EGFR ml/min/1 73sq m 22 22 23 23 24 24 24 23       Anemia  Assessment & Plan  · Chronic anemia without blood loss  · Hemoglobin stable    Results from last 7 days   Lab Units 03/09/23  0645 03/08/23  0444 03/06/23  0524 03/05/23  0620   HEMOGLOBIN g/dL 8 5* 9 4* 8 4* 8 3*       Type 2 diabetes mellitus, with long-term current use of insulin [facility])  Assessment & Plan  Lab Results   Component Value Date    HGBA1C 6 7 (H) 03/03/2023     Recent Labs     03/09/23  1641 03/09/23  2120 03/10/23  0652 03/10/23  1131   POCGLU 109 115 85 95     · Currently on low-dose glargine with sliding scale    Bladder carcinoma (HCC)  Assessment & Plan  · Invasive carcinoma of bladder known to urology service  · No evidence of bleeding this admission  · Most recent PET scan demonstrates advancement of metastatic disease  · After multidisciplinary approach, patient wishes to proceed with comfort care/hospice at this time  VTE Pharmacologic Prophylaxis: VTE Score: 8 High Risk (Score >/= 5) - Pharmacological DVT Prophylaxis Ordered: heparin   Sequential Compression Devices Ordered  Patient Centered Rounds: I have performed bedside rounds with nursing staff today  Discussions with Specialists or Other Care Team Provider: Oncology infectious disease hospice liaison and case management    Education and Discussions with Family / Patient: Attempted to update  (Granddaughter) via phone  Left voicemail  Time Spent for Care: This time was spent on one or more of the following: performing physical exam; counseling and coordination of care; obtaining or reviewing history; documenting in the medical record; reviewing/ordering tests, medications or procedures; communicating with other healthcare professionals and discussing with patient's family/caregivers  Current Length of Stay: 7 day(s)  Current Patient Status: Inpatient   Certification Statement: The patient will continue to require additional inpatient hospital stay due to Awaiting hospice evaluation and home arrangements  Discharge Plan: Anticipate discharge tomorrow to Home with hospice    Code Status: Level 3 - DNAR and DNI      Subjective:   Patient seen and examined  Oxycodone 10 mg still not providing adequate pain control  Objective:   Vitals: Blood pressure 133/89, pulse (!) 112, temperature 99 6 °F (37 6 °C), temperature source Axillary, resp  rate 20, height 6' 4" (1 93 m), weight 85 3 kg (188 lb 0 8 oz), SpO2 99 %  Intake/Output Summary (Last 24 hours) at 3/10/2023 1632  Last data filed at 3/10/2023 1416  Gross per 24 hour   Intake --   Output 2400 ml   Net -2400 ml       Physical Exam  Vitals reviewed  Constitutional:       General: He is not in acute distress  Appearance: He is ill-appearing  HENT:      Head: Atraumatic  Cardiovascular:      Rate and Rhythm: Regular rhythm  Pulmonary:      Effort: Pulmonary effort is normal       Breath sounds: Decreased breath sounds present  No wheezing  Abdominal:      General: Bowel sounds are normal       Palpations: Abdomen is soft  Tenderness: There is no abdominal tenderness  There is no rebound  Musculoskeletal:         General: Tenderness present  No swelling  Skin:     General: Skin is warm  Neurological:      General: No focal deficit present  Mental Status: He is alert and oriented to person, place, and time  Cranial Nerves: No cranial nerve deficit  Psychiatric:         Mood and Affect: Mood normal        Additional Data:   Labs:  Results from last 7 days   Lab Units 03/09/23  0645 03/08/23  0444 03/06/23  0524   WBC Thousand/uL 18 24* 19 25* 15 69*   HEMOGLOBIN g/dL 8 5* 9 4* 8 4*   PLATELETS Thousands/uL 248 298 237   MCV fL 92 90 91     Results from last 7 days   Lab Units 03/10/23  0643 03/09/23  0645 03/08/23  0444   SODIUM mmol/L 134* 133* 132*   POTASSIUM mmol/L 4 7 5 0 5 1   CHLORIDE mmol/L 100 100 100   CO2 mmol/L 27 26 24   ANION GAP mmol/L 7 7 8   BUN mg/dL 44* 47* 45*   CREATININE mg/dL 2 55* 2 57* 2 47*   CALCIUM mg/dL 10 5* 10 1 10 7*   ALBUMIN g/dL  --  2 9*  --    TOTAL BILIRUBIN mg/dL  --  0 34  --    ALK PHOS U/L  --  114*  --    ALT U/L  --  14  --    AST U/L  --  14  --    EGFR ml/min/1 73sq m 22 22 23   GLUCOSE RANDOM mg/dL 83 65 78                          Results from last 7 days   Lab Units 03/10/23  1131 03/10/23  0652 03/09/23  2120 03/09/23  1641 03/09/23  1058 03/09/23  0722 03/08/23  2052 03/08/23  1605 03/08/23  1105 03/08/23  0748 03/08/23  0711 03/07/23  2048   POC GLUCOSE mg/dl 95 85 115 109 137 96 92 179* 86 115 68 116         Results from last 7 days   Lab Units 03/08/23  0444   TSH 3RD GENERATON uIU/mL 1 930     * I Have Reviewed All Lab Data Listed Above  Cultures:   Results from last 7 days   Lab Units 03/03/23  1702 03/03/23  1646   BLOOD CULTURE   --  No Growth After 5 Days     URINE CULTURE  >100,000 cfu/ml  --                  Lines/Drains:  Invasive Devices     Peripheral Intravenous Line  Duration           Long-Dwell Peripheral IV (Midline) 03/08/23 Left 2 days Drain  Duration           Nephrostomy Left 10 2 Fr  59 days    Nephrostomy Right 10 2 Fr  59 days              Telemetry:      Imaging:  Imaging Reports Reviewed Today Include:   XR chest pa & lateral    Result Date: 3/5/2023  Impression: Similar patchy opacity of the left base compared to the PET CT of 3/1/2023, which may represent atelectasis, tumor, and/or infection/inflammation, or combination thereof  Stable small left pleural effusion compared to the PET CT of 3/1/2023  No pneumothorax   Workstation performed: [id number]       Scheduled Meds:  Current Facility-Administered Medications   Medication Dose Route Frequency Provider Last Rate   • acetaminophen  975 mg Oral Q8H PRN Den Hunt PA-C     • aluminum-magnesium hydroxide-simethicone  30 mL Oral Q6H PRN Madisyn Paigeine, DO     • ARIPiprazole  2 mg Oral Daily Piyush Gary, DO     • aspirin  81 mg Oral Daily Piyush Gary, DO     • bimatoprost  1 drop Both Eyes HS Piyush Gary, DO     • docusate sodium  100 mg Oral BID Madisyn Puri, DO     • ezetimibe  10 mg Oral Daily Piyush Gary, DO     • gabapentin  300 mg Oral HS Piyush Gary, DO     • heparin (porcine)  5,000 Units Subcutaneous [facility] Piyush Gary, DO     • HYDROmorphone  1 mg Intravenous Q4H PRN Mitra Dickerson DO     • lidocaine  1 patch Topical Daily Perico Diaz PA-C     • metoprolol tartrate  25 mg Oral Q12H [address] Piyush Gary, DO     • mirtazapine  7 5 mg Oral HS EVANGELINA Ramos     • ondansetron  4 mg Oral 4x Daily Fabiano Villanueva DO     • oxybutynin  5 mg Oral TID Madisyn Puri, DO     • oxyCODONE  10 mg Oral Q4H PRN Mitra Dickerson DO     • oxyCODONE  15 mg Oral Q4H PRN Mitra Dickerson DO     • pantoprazole  40 mg Oral Daily Before Breakfast Piyush Gary, DO     • polyethylene glycol  17 g Oral BID Gertrude Gregory PA-C     • simethicone  80 mg Oral 4x Daily PRN Piyush Gary DO         Today, Patient Was Seen By: Mitra Dickerson DO    ** Please Note: Dictation voice to text software may have been used in the creation of this document   **

## 2023-03-10 NOTE — PROGRESS NOTES
NEPHROLOGY PROGRESS NOTE   Jose Jiménez [de-identified] y o  male MRN: [id number]  Unit/Bed#: E5 -01 Encounter: [id number]      ASSESSMENT & PLAN:  1 hyponatremia, suspected multifactorial, poor oral intake, malignancy, noted patient was previously on Cymbalta but it was discontinued on 3/3  Serum sodium improving up to 134 today  Keep holding diuretics until oral intake improved  Consider gentle IV fluids boluses if needed if not good oral tolerance  No further recommendation from kidney standpoint of view, will sign off, call if any questions    2 chronic kidney disease stage IV, [facility]z [facility], baseline creatinine in the mid to high 2s, renal function is stable creatinine of 2 55 today  Continue follow-up with his outpatient nephrologist after discharge    3 sepsis secondary to VRE UTI, antibiotics as per infectious disease    #4 history of requiring high grade bladder cancer status post multiple resections and intravesical therapy, bilateral hydronephrosis with bilateral PCN  Patient follows with Rylie Weiner as an outpatient  Urology and hematology oncology on board    My plan and recommendation were discussed with internal medicine attending, serum sodium improving up to 134    Keep holding diuretic for now, he agrees with the plan  No Further recommendation from kidney standpoint of view, will sign off, call if any questions    SUBJECTIVE:  Patient seen and examined, lying in bed, continues with chronic lower back pain as well as flank pain, denies any nausea, no vomiting, no diarrhea, denies any chest pain or shortness of breath    OBJECTIVE:  Current Weight: Weight - Scale: 85 3 kg (188 lb 0 8 oz)  Vitals:    03/10/23 1008   BP: 133/89   Pulse:    Resp:    Temp:    SpO2:        Intake/Output Summary (Last 24 hours) at 3/10/2023 1241  Last data filed at 3/10/2023 1005  Gross per 24 hour   Intake --   Output 1650 ml   Net -1650 ml       General: conscious, cooperative, in not acute distress  Eyes: conjunctivae pink, anicteric sclerae  ENT: lips and mucous membranes moist  Neck: supple, no JVD  Chest: clear breath sounds bilateral, no crackles, ronchus or wheezings  CVS: distinct S1 & S2, normal rate, regular rhythm  Abdomen: soft, non-tender, non-distended, normoactive bowel sounds  Back: bilateral PCNs in place  Extremities: no edema of both legs  Skin: no rash  Neuro: Somnolent but arousable, alert, interactive      Medications:    Current Facility-Administered Medications:   •  acetaminophen (TYLENOL) tablet 975 mg, 975 mg, Oral, Q8H PRN, Chandni Mclain PA-C, 975 mg at 03/09/23 5950  •  aluminum-magnesium hydroxide-simethicone (MYLANTA) oral suspension 30 mL, 30 mL, Oral, Q6H PRN, Simona Seton, DO  •  ARIPiprazole (ABILIFY) tablet 2 mg, 2 mg, Oral, Daily, Piyush Rouse, DO, 2 mg at 03/10/23 1006  •  aspirin (ECOTRIN LOW STRENGTH) EC tablet 81 mg, 81 mg, Oral, Daily, Scarlet Seton, DO, 81 mg at 03/10/23 1006  •  bimatoprost (LUMIGAN) 0 01 % ophthalmic solution 1 drop, 1 drop, Both Eyes, HS, Piyush Rouse, DO, 1 drop at 03/09/23 2125  •  docusate sodium (COLACE) capsule 100 mg, 100 mg, Oral, BID, Piyush Rouse, DO, 100 mg at 03/10/23 1006  •  ezetimibe (ZETIA) tablet 10 mg, 10 mg, Oral, Daily, Piyush Howell Padma, DO, 10 mg at 03/10/23 1006  •  gabapentin (NEURONTIN) capsule 300 mg, 300 mg, Oral, HS, Piyush Rouse, DO, 300 mg at 03/09/23 2125  •  heparin (porcine) subcutaneous injection 5,000 Units, 5,000 Units, Subcutaneous, Q8H [facility], 5,000 Units at 03/10/23 0629 **AND** [CANCELED] Platelet count, , , Once, Piyush Rouse DO  •  HYDROmorphone (DILAUDID) injection 1 mg, 1 mg, Intravenous, Q4H PRN, Meredith Villanueva, DO  •  lidocaine (LIDODERM) 5 % patch 1 patch, 1 patch, Topical, Daily, Perico Diaz PA-C, 1 patch at 03/10/23 1014  •  metoprolol tartrate (LOPRESSOR) tablet 25 mg, 25 mg, Oral, Q12H [facility], Simona Asif DO, 25 mg at 03/10/23 1006  •  mirtazapine (REMERON) tablet 7 5 mg, 7 5 mg, Oral, HS, EVANGELINA Ramos, 7 5 mg at 03/09/23 2125  •  ondansetron (ZOFRAN-ODT) dispersible tablet 4 mg, 4 mg, Oral, 4x Daily, Fabiano Villanueva DO, 4 mg at 03/10/23 1006  •  oxybutynin (DITROPAN) tablet 5 mg, 5 mg, Oral, TID, Myles Miller DO, 5 mg at 03/10/23 1005  •  oxyCODONE (ROXICODONE) immediate release tablet 10 mg, 10 mg, Oral, Q4H PRN, Fabiano Villanueva DO, 10 mg at 03/10/23 1006  •  oxyCODONE (ROXICODONE) IR tablet 5 mg, 5 mg, Oral, Q4H PRN, Steve Florez DO  •  pantoprazole (PROTONIX) EC tablet 40 mg, 40 mg, Oral, Daily Before Breakfast, Piyush Ferguson DO, 40 mg at 03/10/23 2598  •  polyethylene glycol (MIRALAX) packet 17 g, 17 g, Oral, BID, Perico Diaz PA-C, 17 g at 03/10/23 1006  •  simethicone (MYLICON) chewable tablet 80 mg, 80 mg, Oral, 4x Daily PRN, Myles Miller DO    Invasive Devices:        Lab Results:   Results from last 7 days   Lab Units 03/10/23  0643 03/09/23  0645 03/08/23  0444 03/06/23  1549 03/06/23  0524   WBC Thousand/uL  --  18 24* 19 25*  --  15 69*   HEMOGLOBIN g/dL  --  8 5* 9 4*  --  8 4*   HEMATOCRIT %  --  26 7* 29 3*  --  26 1*   PLATELETS Thousands/uL  --  248 298  --  237   SODIUM mmol/L 134* 133* 132*   < > 132*   POTASSIUM mmol/L 4 7 5 0 5 1   < > 5 1   CHLORIDE mmol/L 100 100 100   < > 102   CO2 mmol/L 27 26 24   < > 22   BUN mg/dL 44* 47* 45*   < > 46*   CREATININE mg/dL 2 55* 2 57* 2 47*   < > 2 37*   CALCIUM mg/dL 10 5* 10 1 10 7*   < > 10 0   ALK PHOS U/L  --  114*  --   --   --    ALT U/L  --  14  --   --   --    AST U/L  --  14  --   --   --     < > = values in this interval not displayed  Previous work up:  Urine osmolality 369  Urine sodium 60      Portions of the record may have been created with voice recognition software  Occasional wrong word or "sound a like" substitutions may have occurred due to the inherent limitations of voice recognition software   Read the chart carefully and recognize, using context, where substitutions have occurred  If you have any questions, please contact the dictating provider

## 2023-03-10 NOTE — ASSESSMENT & PLAN NOTE
· Stable at baseline    Results from last 7 days   Lab Units 03/10/23  0643 03/09/23  0645 03/08/23  0444 03/07/23  0521 03/06/23  0524 03/05/23  0620 03/04/23  0617 03/03/23  1754   BUN mg/dL 44* 47* 45* 47* 46* 47* 48* 50*   CREATININE mg/dL 2 55* 2 57* 2 47* 2 49* 2 37* 2 40* 2 39* 2 50*   EGFR ml/min/1 73sq m 22 22 23 23 24 24 24 23

## 2023-03-10 NOTE — PLAN OF CARE
Problem: Potential for Falls  Goal: Patient will remain free of falls  Description: INTERVENTIONS:  - Educate patient/family on patient safety including physical limitations  - Instruct patient to call for assistance with activity   - Consult OT/PT to assist with strengthening/mobility   - Keep Call bell within reach  - Keep bed low and locked with side rails adjusted as appropriate  - Keep care items and personal belongings within reach  - Initiate and maintain comfort rounds  - Make Fall Risk Sign visible to staff  - Apply yellow socks and bracelet for high fall risk patients  - Consider moving patient to room near nurses station  Outcome: Progressing     Problem: Nutrition/Hydration-ADULT  Goal: Nutrient/Hydration intake appropriate for improving, restoring or maintaining nutritional needs  Description: Monitor and assess patient's nutrition/hydration status for malnutrition  Collaborate with interdisciplinary team and initiate plan and interventions as ordered  Monitor patient's weight and dietary intake as ordered or per policy  Utilize nutrition screening tool and intervene as necessary  Determine patient's food preferences and provide high-protein, high-caloric foods as appropriate       INTERVENTIONS:  - Monitor oral intake, urinary output, labs, and treatment plans  - Assess nutrition and hydration status and recommend course of action  - Evaluate amount of meals eaten  - Assist patient with eating if necessary   - Allow adequate time for meals  - Recommend/ encourage appropriate diets, oral nutritional supplements, and vitamin/mineral supplements  - Order, calculate, and assess calorie counts as needed  - Recommend, monitor, and adjust tube feedings and TPN/PPN based on assessed needs  - Assess need for intravenous fluids  - Provide specific nutrition/hydration education as appropriate  - Include patient/family/caregiver in decisions related to nutrition  Outcome: Progressing     Problem: MOBILITY - ADULT  Goal: Maintain or return to baseline ADL function  Description: INTERVENTIONS:  - Educate patient/family on patient safety including physical limitations  - Instruct patient to call for assistance with activity   - Consult OT/PT to assist with strengthening/mobility   - Keep Call bell within reach  - Keep bed low and locked with side rails adjusted as appropriate  - Keep care items and personal belongings within reach  - Initiate and maintain comfort rounds  - Make Fall Risk Sign visible to staff  - Apply yellow socks and bracelet for high fall risk patients  - Consider moving patient to room near nurses station  Outcome: Progressing  Goal: Maintains/Returns to pre admission functional level  Description: INTERVENTIONS:  - Perform BMAT or MOVE assessment daily    - Set and communicate daily mobility goal to care team and patient/family/caregiver     - Collaborate with rehabilitation services on mobility goals if consulted  - Perform Range of Motion   - Reposition patient   - Dangle patient   - Stand patient   - Ambulate patient   - Out of bed to chair   - Out of bed for meals   - Out of bed for toileting  - Record patient progress and toleration of activity level   Outcome: Progressing     Problem: Prexisting or High Potential for Compromised Skin Integrity  Goal: Skin integrity is maintained or improved  Description: INTERVENTIONS:  - Identify patients at risk for skin breakdown  - Assess and monitor skin integrity  - Assess and monitor nutrition and hydration status  - Monitor labs   - Assess for incontinence   - Turn and reposition patient  - Assist with mobility/ambulation  - Relieve pressure over bony prominences  - Avoid friction and shearing  - Provide appropriate hygiene as needed including keeping skin clean and dry  - Evaluate need for skin moisturizer/barrier cream  - Collaborate with interdisciplinary team   - Patient/family teaching  - Consider wound care consult   Outcome: Progressing Problem: GENITOURINARY - ADULT  Goal: Maintains or returns to baseline urinary function  Description: INTERVENTIONS:  - Assess urinary function  - Encourage oral fluids to ensure adequate hydration if ordered  - Administer IV fluids as ordered to ensure adequate hydration  - Administer ordered medications as needed  - Offer frequent toileting  - Follow urinary retention protocol if ordered  Outcome: Progressing     Problem: METABOLIC, FLUID AND ELECTROLYTES - ADULT  Goal: Electrolytes maintained within normal limits  Description: INTERVENTIONS:  - Monitor labs and assess patient for signs and symptoms of electrolyte imbalances  - Administer electrolyte replacement as ordered  - Monitor response to electrolyte replacements, including repeat lab results as appropriate  - Instruct patient on fluid and nutrition as appropriate  Outcome: Progressing  Goal: Fluid balance maintained  Description: INTERVENTIONS:  - Monitor labs   - Monitor I/O and WT  - Instruct patient on fluid and nutrition as appropriate  - Assess for signs & symptoms of volume excess or deficit  Outcome: Progressing  Goal: Glucose maintained within target range  Description: INTERVENTIONS:  - Monitor Blood Glucose as ordered  - Assess for signs and symptoms of hyperglycemia and hypoglycemia  - Administer ordered medications to maintain glucose within target range  - Assess nutritional intake and initiate nutrition service referral as needed  Outcome: Progressing     Problem: SKIN/TISSUE INTEGRITY - ADULT  Goal: Skin Integrity remains intact(Skin Breakdown Prevention)  Description: Assess:  -Perform Ortega assessment every shift and prn  -Inspect skin when repositioning, toileting, and assisting with ADLS  -Assess extremities for adequate circulation and sensation     Bed Management:  -Have minimal linens on bed & keep smooth, unwrinkled  -Change linens as needed when moist or perspiring      Activity:  -Encourage activity and walks on unit  -Encourage or provide ROM exercises  -Use appropriate equipment to lift or move patient in bed    Skin Care:  -Avoid use of baby powder, tape, friction and shearing, hot water or constrictive clothing  -Do not massage red bony areas    Outcome: Progressing  Goal: Pressure injury heals and does not worsen  Description: Interventions:  - Implement low air loss mattress or specialty surface (Criteria met)  - Apply silicone foam dressing  - Apply fecal or urinary incontinence containment device   - Utilize friction reducing device or surface for transfers   - Consider nutrition services referral as needed  Outcome: Progressing     Problem: PAIN - ADULT  Goal: Verbalizes/displays adequate comfort level or baseline comfort level  Description: Interventions:  - Encourage patient to monitor pain and request assistance  - Assess pain using appropriate pain scale  - Administer analgesics based on type and severity of pain and evaluate response  - Implement non-pharmacological measures as appropriate and evaluate response  - Consider cultural and social influences on pain and pain management  - Notify physician/advanced practitioner if interventions unsuccessful or patient reports new pain  Outcome: Progressing     Problem: INFECTION - ADULT  Goal: Absence or prevention of progression during hospitalization  Description: INTERVENTIONS:  - Assess and monitor for signs and symptoms of infection  - Monitor lab/diagnostic results  - Monitor all insertion sites, i e  indwelling lines, tubes, and drains  - Monitor endotracheal if appropriate and nasal secretions for changes in amount and color  - [place] appropriate cooling/warming therapies per order  - Administer medications as ordered  - Instruct and encourage patient and family to use good hand hygiene technique  - Identify and instruct in appropriate isolation precautions for identified infection/condition  Outcome: Progressing     Problem: SAFETY ADULT  Goal: Patient will remain free of falls  Description: INTERVENTIONS:  - Educate patient/family on patient safety including physical limitations  - Instruct patient to call for assistance with activity   - Consult OT/PT to assist with strengthening/mobility   - Keep Call bell within reach  - Keep bed low and locked with side rails adjusted as appropriate  - Keep care items and personal belongings within reach  - Initiate and maintain comfort rounds  - Make Fall Risk Sign visible to staff  - Apply yellow socks and bracelet for high fall risk patients  - Consider moving patient to room near nurses station  Outcome: Progressing  Goal: Maintain or return to baseline ADL function  Description: INTERVENTIONS:  - Educate patient/family on patient safety including physical limitations  - Instruct patient to call for assistance with activity   - Consult OT/PT to assist with strengthening/mobility   - Keep Call bell within reach  - Keep bed low and locked with side rails adjusted as appropriate  - Keep care items and personal belongings within reach  - Initiate and maintain comfort rounds  - Make Fall Risk Sign visible to staff  - Apply yellow socks and bracelet for high fall risk patients  - Consider moving patient to room near nurses station  Outcome: Progressing  Goal: Maintains/Returns to pre admission functional level  Description: INTERVENTIONS:  - Perform BMAT or MOVE assessment daily    - Set and communicate daily mobility goal to care team and patient/family/caregiver     - Collaborate with rehabilitation services on mobility goals if consulted  - Perform Range of Motion   - Reposition patient   - Dangle patient   - Stand patient   - Ambulate patient   - Out of bed to chair   - Out of bed for meals   - Out of bed for toileting  - Record patient progress and toleration of activity level   Outcome: Progressing

## 2023-03-10 NOTE — PROGRESS NOTES
Progress Note - Infectious Disease   Nonda Hernandez [de-identified] y o  male MRN: [id number]  Unit/Bed#: E5 -01 Encounter: [id number]      Impression/Plan:  1  Leukocytosis:  - On review of labs, patient has had leukocytosis since 1/11/2023  His labs from February in [facility] noted WBC of 13-16 5, including day of discharge  He has also seen by our ID service in January as well for this same issue, a persistent elevated WBC with peripheral eosinophilia  This was felt at that time to be inflammatory in nature  Patient has again same findings this admission  He has not had any fever  Blood cultures are negative  He did have pyuria on UA, though always has pyuria likely in setting of his urothelial malignancy  He is being treated for the VRE found in urine from 2/27, though his more recent urine cx 3/3 was negative and his WBC has not responded regardless to the antibiotic therapy  He has no major pulmonary or GI complaints  His dysuria is improved  He does report he has been having left sided chest pain since the removal of his prior pleural catheter, as below  No signs of skin/soft tissue or joint infection on exam  No redness or drainage form PCN sites or PICC line  Pulm has reviewed case as below, no plans for surgical intervention and no significant pleural effusion   LFTs normal  Repeat CT of chest/abd/pelvis shows similar findings as from PET scan from 3/1, concerning for widely metastatic disease, without any new findings to suggest new process       - suspect this is  related to his extensive and progressive malignancy/metastsis, best noted on recent PET-CT from 3/1/23 and again seen on updated CT scans  - with negative infectious workup as above and patient having completed antibiotic course for prior positive urine culture, would monitor off any further antibiotics  - Pulm reviewed case and feel no role for invasive procedures   - ongoing discussion with family regarding palliative radiation vs  comfort measures; appreciate Oncology support     2  Pyuria on UA/Recent VRE in Urine Cx:  - Patient has had innumerable WBCs on every UA in our system going back to 2/2022  This is a chronic finding, likely from his bladder malignancy, and will not be a helpful marker to indicate infection  He had a urine cx from 2/27/23 that grew both VRE and Staph epi  I question if this was real infection vs  possible contamination depending on how sample was collected  His repeat urine sample from 3/3, collected BEFORE antibiotics, grew only mixed contaminants further suggesting culture from 2/27 less likely true infection  Dysuria may also be explained by his disease progression in the  system  Regardless he has completed 6 days of Linezolid therapy which has not made a difference in his WBC     - completed course of Linezolid, monitor off further antibiotics  - ongoing/intermittent dysuria may be related to urothelial disease itself     3  Left Pleural-based Tumor:  - This is a known finding  CT from 1/17/23 noted progression of this tumor, at that time noting progression even from 10 days prior  PET CT 3/1 now showing more extensive pleural thickening of this area, progressed since CT in January, and a large heterogenous soft tissue focus at left lung base also seen previously but now more extensive and possibly extending through chest wall with rib destruction        - given patient's overall history, along with additional many areas concerning for metastasis on scan, highly concerning for progressive malignancy  - Pulm reviewed case, feel no current role for invasive procedrues     3   Concern for Metastatic Bladder Cancer:  - Recent PET CT 3/1/23 noted high concerns for progression of malignancy with increased pleural thickening on left and extension into chest wall, new left hilar focus concerning for lymph node metastasis, new left axillary node uptake, concern for left chest wall intramuscular mets, scattered foci in liver compatible with mets, RP lymph node uptake, uptake in left iliac chain lymph nodes, uptake in base of penis, and new avid osseous lesions suspicious for mets       - I suspect this is the primary  of his chronic leukocytosis  - consider Oncology evaluation while in hospital     4  CKD stage IV:  - Baseline Cr in 2 4 range  - currently at baseline  - daily BMP     5  Hx of Enterobacter bacteremia (2023):  - Patient completed treatment 23, felt to be likely transiet from  source  Nephrostomy tubes were exchanged and port removed on 1/10/23  Blood cultures this admission negative  Plan and recommendations were discussed with primary team   ID will sign off  Call with questions  Antibiotics:  none    Subjective:  Patient has no fever, chills, sweats  Today he reports left chest wall pain is somewhat better overall  No other new complaints  Objective:  Vitals:  Temp:  [97 7 °F (36 5 °C)-99 6 °F (37 6 °C)] 99 6 °F (37 6 °C)  HR:  [] 112  Resp:  [17-20] 20  BP: (103-195)/() 195/72  SpO2:  [97 %-99 %] 99 %  Temp (24hrs), Av 6 °F (37 °C), Min:97 7 °F (36 5 °C), Max:99 6 °F (37 6 °C)  Current: Temperature: 99 6 °F (37 6 °C)    Physical Exam:   General Appearance:  Alert, interactive, nontoxic, no acute distress  Throat: Oropharynx moist     Lungs:   ; no wheezes, rhonchi or rales; respirations unlabored   Heart:  RRR; no murmur, rub or gallop   Abdomen:   Soft, non-tender   Extremities: No clubbing, cyanosis or edema   Skin: No new rashes or lesions  No draining wounds noted  Labs:    All pertinent labs and imaging studies were personally reviewed  Results from last 7 days   Lab Units 23  0645 23  0524   WBC Thousand/uL 18 24* 19 25* 15 69*   HEMOGLOBIN g/dL 8 5* 9 4* 8 4*   PLATELETS Thousands/uL 248 298 237     Results from last 7 days   Lab Units 23  0645 23  0444 23  0521   SODIUM mmol/L 133* 132* 133*   POTASSIUM mmol/L 5 0 5 1 5 2   CHLORIDE mmol/L 100 100 99   CO2 mmol/L 26 24 25   BUN mg/dL 47* 45* 47*   CREATININE mg/dL 2 57* 2 47* 2 49*   EGFR ml/min/1 73sq m 22 23 23   CALCIUM mg/dL 10 1 10 7* 10 3*   AST U/L 14  --   --    ALT U/L 14  --   --    ALK PHOS U/L 114*  --   --        Micro:  Results from last 7 days   Lab Units 03/03/23  1702 03/03/23  1646 03/03/23  1534   BLOOD CULTURE   --  No Growth After 5 Days  No Growth After 5 Days  URINE CULTURE  >100,000 cfu/ml  --   --        Imaging:    CT C/A/P 3/9/23:  1  Interim progression of metastatic disease, likely reflecting the patient's known urothelial carcinoma  In addition to primary site in the urinary bladder, metastatic changes involve the left pleural space, left chest wall, multiple osseous   structures, the liver, and abdominal/pelvic adenopathy as detailed above  2   Additional findings as noted        Ho You MD  [facility]

## 2023-03-10 NOTE — ASSESSMENT & PLAN NOTE
· Nonspecific hyponatremia likely secondary to poor intake  Duloxetine discontinued  · Urology consulted for assistance    Started on gentle IV fluids    Results from last 7 days   Lab Units 03/10/23  0643 03/09/23  0645 03/08/23  0444 03/07/23  0521   SODIUM mmol/L 134* 133* 132* 133*

## 2023-03-10 NOTE — ASSESSMENT & PLAN NOTE
· Invasive carcinoma of bladder known to urology service  · No evidence of bleeding this admission  · Most recent PET scan demonstrates advancement of metastatic disease  · After multidisciplinary approach, patient wishes to proceed with comfort care/hospice at this time

## 2023-03-11 LAB
GLUCOSE SERPL-MCNC: 101 MG/DL (ref 65–140)
GLUCOSE SERPL-MCNC: 149 MG/DL (ref 65–140)
GLUCOSE SERPL-MCNC: 162 MG/DL (ref 65–140)
GLUCOSE SERPL-MCNC: 167 MG/DL (ref 65–140)

## 2023-03-11 RX ORDER — OXYCODONE HYDROCHLORIDE 15 MG/1
15 TABLET ORAL EVERY 4 HOURS PRN
Qty: 15 TABLET | Refills: 0 | Status: SHIPPED | OUTPATIENT
Start: 2023-03-11 | End: 2023-03-13 | Stop reason: SDUPTHER

## 2023-03-11 RX ADMIN — ONDANSETRON 4 MG: 4 TABLET, ORALLY DISINTEGRATING ORAL at 17:50

## 2023-03-11 RX ADMIN — POLYETHYLENE GLYCOL 3350 17 G: 17 POWDER, FOR SOLUTION ORAL at 20:20

## 2023-03-11 RX ADMIN — BIMATOPROST 1 DROP: 0.1 SOLUTION/ DROPS OPHTHALMIC at 21:18

## 2023-03-11 RX ADMIN — MIRTAZAPINE 7.5 MG: 15 TABLET, FILM COATED ORAL at 21:18

## 2023-03-11 RX ADMIN — PANTOPRAZOLE SODIUM 40 MG: 40 TABLET, DELAYED RELEASE ORAL at 07:24

## 2023-03-11 RX ADMIN — LIDOCAINE 5% 1 PATCH: 700 PATCH TOPICAL at 09:00

## 2023-03-11 RX ADMIN — ARIPIPRAZOLE 2 MG: 2 TABLET ORAL at 09:00

## 2023-03-11 RX ADMIN — POLYETHYLENE GLYCOL 3350 17 G: 17 POWDER, FOR SOLUTION ORAL at 09:00

## 2023-03-11 RX ADMIN — DOCUSATE SODIUM 100 MG: 100 CAPSULE, LIQUID FILLED ORAL at 17:50

## 2023-03-11 RX ADMIN — HEPARIN SODIUM 5000 UNITS: 5000 INJECTION INTRAVENOUS; SUBCUTANEOUS at 21:18

## 2023-03-11 RX ADMIN — METOPROLOL TARTRATE 25 MG: 25 TABLET, FILM COATED ORAL at 20:21

## 2023-03-11 RX ADMIN — OXYCODONE HYDROCHLORIDE 10 MG: 10 TABLET ORAL at 06:53

## 2023-03-11 RX ADMIN — OXYCODONE HYDROCHLORIDE 10 MG: 10 TABLET ORAL at 17:55

## 2023-03-11 RX ADMIN — ONDANSETRON 4 MG: 4 TABLET, ORALLY DISINTEGRATING ORAL at 21:18

## 2023-03-11 RX ADMIN — HYDROMORPHONE HYDROCHLORIDE 1 MG: 1 INJECTION, SOLUTION INTRAMUSCULAR; INTRAVENOUS; SUBCUTANEOUS at 22:54

## 2023-03-11 RX ADMIN — HEPARIN SODIUM 5000 UNITS: 5000 INJECTION INTRAVENOUS; SUBCUTANEOUS at 06:54

## 2023-03-11 RX ADMIN — ONDANSETRON 4 MG: 4 TABLET, ORALLY DISINTEGRATING ORAL at 12:08

## 2023-03-11 RX ADMIN — HEPARIN SODIUM 5000 UNITS: 5000 INJECTION INTRAVENOUS; SUBCUTANEOUS at 14:23

## 2023-03-11 RX ADMIN — DOCUSATE SODIUM 100 MG: 100 CAPSULE, LIQUID FILLED ORAL at 09:01

## 2023-03-11 RX ADMIN — ONDANSETRON 4 MG: 4 TABLET, ORALLY DISINTEGRATING ORAL at 09:00

## 2023-03-11 RX ADMIN — HYDROMORPHONE HYDROCHLORIDE 1 MG: 1 INJECTION, SOLUTION INTRAMUSCULAR; INTRAVENOUS; SUBCUTANEOUS at 11:02

## 2023-03-11 RX ADMIN — EZETIMIBE 10 MG: 10 TABLET ORAL at 09:00

## 2023-03-11 RX ADMIN — METOPROLOL TARTRATE 25 MG: 25 TABLET, FILM COATED ORAL at 09:01

## 2023-03-11 RX ADMIN — ASPIRIN 81 MG: 81 TABLET, COATED ORAL at 09:00

## 2023-03-11 RX ADMIN — OXYBUTYNIN CHLORIDE 5 MG: 5 TABLET ORAL at 20:21

## 2023-03-11 RX ADMIN — OXYBUTYNIN CHLORIDE 5 MG: 5 TABLET ORAL at 16:03

## 2023-03-11 RX ADMIN — GABAPENTIN 300 MG: 300 CAPSULE ORAL at 21:18

## 2023-03-11 RX ADMIN — OXYCODONE HYDROCHLORIDE 10 MG: 10 TABLET ORAL at 20:25

## 2023-03-11 RX ADMIN — OXYBUTYNIN CHLORIDE 5 MG: 5 TABLET ORAL at 09:00

## 2023-03-11 NOTE — ASSESSMENT & PLAN NOTE
· Chronic anemia without blood loss    · Hemoglobin stable with most recent blood draw    Results from last 7 days   Lab Units 03/09/23  0645 03/08/23  0444 03/06/23  0524 03/05/23  0620   HEMOGLOBIN g/dL 8 5* 9 4* 8 4* 8 3*

## 2023-03-11 NOTE — ASSESSMENT & PLAN NOTE
· SIRS with pyuria on admission secondary to VRE based on outside urine culture on 2/27/2023  · Completed course of linezolid per ID  · Blood cultures negative; repeated urine cultures on admission mixed contaminants  · Chronic leukocytosis which after multidisciplinary consultation with ID pulmonology and oncology no evidence of acute infection is found  · The patient has widespread metastasis and has opted for hospice/comfort care       Results from last 7 days   Lab Units 03/09/23  0645 03/08/23  0444 03/06/23  0524 03/05/23  0620   WBC Thousand/uL 18 24* 19 25* 15 69* 15 56*

## 2023-03-11 NOTE — ASSESSMENT & PLAN NOTE
· Initially had uncontrolled pain due to bony metastasis  Increased oxycodone to 10 and 15 mg for moderate/severe pain and IV hydromorphone for breakthrough pain  · Seems to be well controlled    Will send in prescription for oxycodone 15 mg to hospice pharmacy

## 2023-03-11 NOTE — CASE MANAGEMENT
Case Management Discharge Planning Note    Patient name Zay [place] [id number] /[id number] [id number]-* MRN [id number]  : 1945 Date 3/11/2023       Current Admission Date: 3/3/2023  Current Admission Diagnosis:SIRS (systemic inflammatory response syndrome) [facility])   Patient Active Problem List    Diagnosis Date Noted   • Depression 2023   • Hyponatremia 2023   • Moderate protein-calorie malnutrition ([facility] ) 2023   • SIRS (systemic inflammatory response syndrome) ([facility] ) 2023   • CAD (coronary artery disease) 2023   • Severe protein-calorie malnutrition ([facility] ) 2023   • History of pleural effusion 2023   • Nephrostomy status ([facility] ) 2023   • Acute on chronic blood loss anemia 2023   • Bacteremia due to Enterobacter species 2023   • Elevated brain natriuretic peptide (BNP) level 2023   • Ambulatory dysfunction 2023   • Influenza A 2023   • Elevated troponin 2023   • Right sided weakness 2022   • Stroke-like symptoms 2022   • Symptomatic hypotension 2022   • Insomnia 2022   • Right wrist drop 2022   • Chronic pleural effusion 2022   • Urinary tract infection 2022   • SOB (shortness of breath) 2022   • History of tobacco use disorder 2022   • Retroperitoneal lymphadenopathy 2022   • Pulmonary nodules 2022   • Syncope and collapse 2022   • Depression with suicidal ideation 2022   • Cancer related pain 2022   • Bilateral hydronephrosis 04/10/2022   • Stage 4 chronic kidney disease ([address] ) 2022   • Fall 2022   • Hyperkalemia 2022   • Retained ureteral stent    • 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[facility] ) 2022   • Embolism and 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 ([facility] ) 2022   • Abnormal CT scan, bladder 2022   • Port-A-Cath in place 2022   • Continuous opioid dependence ([facility] ) 2021   • Hematuria 10/24/2021   • Acute urinary retention 10/21/2021   • Chronic pain syndrome 05/17/2021   • Lumbar spondylosis    • [facility] bronchitis ([facility] ) 03/02/2021   • Moderate major depression, single episode ([facility] ) 03/02/2021   • Thrombocytopenia ([facility] ) 03/02/2021   • 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 03/02/2021   • Gross hematuria 02/09/2021   • PAD (peripheral artery disease) ([facility])    • Left carotid bruit    • Anemia of chronic disease 12/19/2020   • Preoperative clearance 12/19/2020   • Anemia 10/10/2020   • Diabetic ulcer of left foot associated with type 2 diabetes mellitus, 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 ([facility] ) 10/08/2020   • Acute kidney injury superimposed on CKD ([place] [address] )    • Dysuria 08/17/2020   • Diabetic polyneuropathy associated with type 2 diabetes mellitus ([place] [address] ) 07/16/2020   • Abnormal MRI, lumbar spine 06/29/2020   • Malignant neoplasm of anterior wall of urinary bladder ([place] [address] )    • Secondary renal hyperparathyroidism ([facility] ) 02/12/2020   • Preop examination 04/16/2019   • Superficial phlebitis 03/07/2019   • Arteriosclerosis of artery of extremity ([place] [address] ) 02/22/2019   • Stable angina pectoris ([place] [address] ) 02/22/2019   • Herpes zoster without complication 36/33/3510   • Localized edema 02/22/2019   • Back pain 01/31/2019   • Degeneration of lumbar intervertebral disc 12/03/2018   • Primary osteoarthritis of left knee 03/16/2018   • Bladder carcinoma ([facility] ) 08/16/2016   • Presence of stent in coronary artery 08/16/2016   • Hypertension with renal disease 10/29/2015   • Hyperlipidemia 10/29/2015   • Cystitis due to intravesical BCG administration 09/24/2015   • Coronary artery disease of native artery of native heart with stable angina pectoris ([facility] ) 05/19/2011   • Type 2 diabetes mellitus, with long-term current use of insulin ([place] [address] ) 01/09/2004      LOS (days): 8  Geometric Mean LOS (GMLOS) (days): 3 50  Days to GMLOS:-4 2     OBJECTIVE:  Risk of Unplanned Readmission Score: 66 55         Current admission status: Inpatient   Preferred Pharmacy:   [facility] [address], [address]   [place] [address]  Phone: [phone] Fax: [address], [address]  [address] [place] [address]  Phone: [phone] Fax: [phone]    Primary Care Provider: Nayeli Fischer MD    Primary Insurance: MEDICARE  Secondary Insurance: BLUE CROSS    DISCHARGE DETAILS:    Additional Comments: CM received update from Nursing that Rehana Dimas is requesting [facility]  CM spoke w/ Pt about GrandDtr's preference v his own  Pt states he had appointed his GrandDtr as his medical POA, and will agree with whichever Hospice she chooses  CM LVM for Doris to confirm  CM on standby waiting for return call   ANDREA informed of same  UPDATE: CM communication w/ [facility]  [phone] who returned CMs call  Saintclair Sleigh states she contacted Tobi Holden and confirmed [facility] is preferred Hospice agency  Saintclair Sleigh reporting that Sampson Kim is requesting a CAD pump to manage Pts pain  CM forwarded the request to SLIM  Plan is for SLIM to discuss w/ GDtr  At this time a CAD Pump does not appear to be indicated  CM deferring to SLIM and stepping out of the discussion-leaving this discussion to stay between JOURDAN and the Sampson Kim [facility] Liaison on standby for SLIMs recommendations  At this time, tentative plan is for Pt to admit to [facility] Monday 3/13  CM continues to follow

## 2023-03-11 NOTE — PLAN OF CARE
Problem: Potential for Falls  Goal: Patient will remain free of falls  Description: INTERVENTIONS:  - Educate patient/family on patient safety including physical limitations  - Instruct patient to call for assistance with activity   - Consult OT/PT to assist with strengthening/mobility   - Keep Call bell within reach  - Keep bed low and locked with side rails adjusted as appropriate  - Keep care items and personal belongings within reach  - Initiate and maintain comfort rounds  - Make Fall Risk Sign visible to staff  - Apply yellow socks and bracelet for high fall risk patients  - Consider moving patient to room near nurses station  Outcome: Progressing     Problem: Nutrition/Hydration-ADULT  Goal: Nutrient/Hydration intake appropriate for improving, restoring or maintaining nutritional needs  Description: Monitor and assess patient's nutrition/hydration status for malnutrition  Collaborate with interdisciplinary team and initiate plan and interventions as ordered  Monitor patient's weight and dietary intake as ordered or per policy  Utilize nutrition screening tool and intervene as necessary  Determine patient's food preferences and provide high-protein, high-caloric foods as appropriate       INTERVENTIONS:  - Monitor oral intake, urinary output, labs, and treatment plans  - Assess nutrition and hydration status and recommend course of action  - Evaluate amount of meals eaten  - Assist patient with eating if necessary   - Allow adequate time for meals  - Recommend/ encourage appropriate diets, oral nutritional supplements, and vitamin/mineral supplements  - Order, calculate, and assess calorie counts as needed  - Recommend, monitor, and adjust tube feedings and TPN/PPN based on assessed needs  - Assess need for intravenous fluids  - Provide specific nutrition/hydration education as appropriate  - Include patient/family/caregiver in decisions related to nutrition  Outcome: Progressing     Problem: MOBILITY - ADULT  Goal: Maintain or return to baseline ADL function  Description: INTERVENTIONS:  - Educate patient/family on patient safety including physical limitations  - Instruct patient to call for assistance with activity   - Consult OT/PT to assist with strengthening/mobility   - Keep Call bell within reach  - Keep bed low and locked with side rails adjusted as appropriate  - Keep care items and personal belongings within reach  - Initiate and maintain comfort rounds  - Make Fall Risk Sign visible to staff  - Apply yellow socks and bracelet for high fall risk patients  - Consider moving patient to room near nurses station  Outcome: Progressing  Goal: Maintains/Returns to pre admission functional level  Description: INTERVENTIONS:  - Perform BMAT or MOVE assessment daily    - Set and communicate daily mobility goal to care team and patient/family/caregiver  - Collaborate with rehabilitation services on mobility goals if consulted  - Perform Range of Motion 3 times a day  - Reposition patient every 2 hours    - Out of bed for toileting  - Record patient progress and toleration of activity level   Outcome: Progressing     Problem: Prexisting or High Potential for Compromised Skin Integrity  Goal: Skin integrity is maintained or improved  Description: INTERVENTIONS:  - Identify patients at risk for skin breakdown  - Assess and monitor skin integrity  - Assess and monitor nutrition and hydration status  - Monitor labs   - Assess for incontinence   - Turn and reposition patient  - Assist with mobility/ambulation  - Relieve pressure over bony prominences  - Avoid friction and shearing  - Provide appropriate hygiene as needed including keeping skin clean and dry  - Evaluate need for skin moisturizer/barrier cream  - Collaborate with interdisciplinary team   - Patient/family teaching  - Consider wound care consult   Outcome: Progressing     Problem: GENITOURINARY - ADULT  Goal: Maintains or returns to baseline urinary function  Description: INTERVENTIONS:  - Assess urinary function  - Encourage oral fluids to ensure adequate hydration if ordered  - Administer IV fluids as ordered to ensure adequate hydration  - Administer ordered medications as needed  - Offer frequent toileting  - Follow urinary retention protocol if ordered  Outcome: Progressing     Problem: METABOLIC, FLUID AND ELECTROLYTES - ADULT  Goal: Electrolytes maintained within normal limits  Description: INTERVENTIONS:  - Monitor labs and assess patient for signs and symptoms of electrolyte imbalances  - Administer electrolyte replacement as ordered  - Monitor response to electrolyte replacements, including repeat lab results as appropriate  - Instruct patient on fluid and nutrition as appropriate  Outcome: Progressing  Goal: Fluid balance maintained  Description: INTERVENTIONS:  - Monitor labs   - Monitor I/O and WT  - Instruct patient on fluid and nutrition as appropriate  - Assess for signs & symptoms of volume excess or deficit  Outcome: Progressing  Goal: Glucose maintained within target range  Description: INTERVENTIONS:  - Monitor Blood Glucose as ordered  - Assess for signs and symptoms of hyperglycemia and hypoglycemia  - Administer ordered medications to maintain glucose within target range  - Assess nutritional intake and initiate nutrition service referral as needed  Outcome: Progressing     Problem: SKIN/TISSUE INTEGRITY - ADULT  Goal: Skin Integrity remains intact(Skin Breakdown Prevention)  Description: Assess:  -Perform Ortega assessment every shift and prn  -Inspect skin when repositioning, toileting, and assisting with ADLS  -Assess extremities for adequate circulation and sensation     Bed Management:  -Have minimal linens on bed & keep smooth, unwrinkled  -Change linens as needed when moist or perspiring      Activity:  -Encourage activity and walks on unit  -Encourage or provide ROM exercises  -Use appropriate equipment to lift or move patient in bed    Skin Care:  -Avoid use of baby powder, tape, friction and shearing, hot water or constrictive clothing  -Do not massage red bony areas    Outcome: Progressing  Goal: Pressure injury heals and does not worsen  Description: Interventions:  - Implement low air loss mattress or specialty surface (Criteria met)  - Apply silicone foam dressing  - Apply fecal or urinary incontinence containment device   - Utilize friction reducing device or surface for transfers   - Consider nutrition services referral as needed  Outcome: Progressing     Problem: PAIN - ADULT  Goal: Verbalizes/displays adequate comfort level or baseline comfort level  Description: Interventions:  - Encourage patient to monitor pain and request assistance  - Assess pain using appropriate pain scale  - Administer analgesics based on type and severity of pain and evaluate response  - Implement non-pharmacological measures as appropriate and evaluate response  - Consider cultural and social influences on pain and pain management  - Notify physician/advanced practitioner if interventions unsuccessful or patient reports new pain  Outcome: Progressing     Problem: INFECTION - ADULT  Goal: Absence or prevention of progression during hospitalization  Description: INTERVENTIONS:  - Assess and monitor for signs and symptoms of infection  - Monitor lab/diagnostic results  - Monitor all insertion sites, i e  indwelling lines, tubes, and drains  - Monitor endotracheal if appropriate and nasal secretions for changes in amount and color  - Bevier appropriate cooling/warming therapies per order  - Administer medications as ordered  - Instruct and encourage patient and family to use good hand hygiene technique  - Identify and instruct in appropriate isolation precautions for identified infection/condition  Outcome: Progressing     Problem: SAFETY ADULT  Goal: Patient will remain free of falls  Description: INTERVENTIONS:  - Educate patient/family on patient safety including physical limitations  - Instruct patient to call for assistance with activity   - Consult OT/PT to assist with strengthening/mobility   - Keep Call bell within reach  - Keep bed low and locked with side rails adjusted as appropriate  - Keep care items and personal belongings within reach  - Initiate and maintain comfort rounds  - Make Fall Risk Sign visible to staff  - Apply yellow socks and bracelet for high fall risk patients  - Consider moving patient to room near nurses station  Outcome: Progressing  Goal: Maintain or return to baseline ADL function  Description: INTERVENTIONS:  - Educate patient/family on patient safety including physical limitations  - Instruct patient to call for assistance with activity   - Consult OT/PT to assist with strengthening/mobility   - Keep Call bell within reach  - Keep bed low and locked with side rails adjusted as appropriate  - Keep care items and personal belongings within reach  - Initiate and maintain comfort rounds  - Make Fall Risk Sign visible to staff  - Apply yellow socks and bracelet for high fall risk patients  - Consider moving patient to room near nurses station  Outcome: Progressing  Goal: Maintains/Returns to pre admission functional level  Description: INTERVENTIONS:  - Perform BMAT or MOVE assessment daily    - Set and communicate daily mobility goal to care team and patient/family/caregiver  - Collaborate with rehabilitation services on mobility goals if consulted  - Perform Range of Motion 3 times a day  - Reposition patient every 2 hours    - Record patient progress and toleration of activity level   Outcome: Progressing

## 2023-03-11 NOTE — ASSESSMENT & PLAN NOTE
· Invasive carcinoma of bladder known to urology service  · No evidence of bleeding this admission  · Most recent PET scan demonstrates advancement of metastatic disease  · After multidisciplinary approach, patient wishes to proceed with comfort care/hospice at this time    · Currently coordinating care with hospice services case management and family

## 2023-03-11 NOTE — PROGRESS NOTES
[facility]  Progress Note - Mike Alva 1945, [de-identified] y o  male MRN: [id number]  Unit/Bed#: E5 -01 Encounter: [id number]  Primary Care Provider: Cali Barajas MD   Date and time admitted to hospital: 3/3/2023  2:31 PM    * SIRS (systemic inflammatory response syndrome) ([facility] [address] )  Assessment & Plan  · SIRS with pyuria on admission secondary to VRE based on outside urine culture on 2/27/2023  · Completed course of linezolid per ID  · Blood cultures negative; repeated urine cultures on admission mixed contaminants  · Chronic leukocytosis which after multidisciplinary consultation with ID pulmonology and oncology no evidence of acute infection is found  · The patient has widespread metastasis and has opted for hospice/comfort care  Results from last 7 days   Lab Units 03/09/23  0645 03/08/23  0444 03/06/23  0524 03/05/23  0620   WBC Thousand/uL 18 24* 19 25* 15 69* 15 56*       Bladder carcinoma (HCC)  Assessment & Plan  · Invasive carcinoma of bladder known to urology service  · No evidence of bleeding this admission  · Most recent PET scan demonstrates advancement of metastatic disease  · After multidisciplinary approach, patient wishes to proceed with comfort care/hospice at this time  · Currently coordinating care with hospice services case management and family    Depression  Assessment & Plan  · Stable continue abilify   · Duloxetine discontinued due to concerns for interaction with linezolid  CAD (coronary artery disease)  Assessment & Plan  · CAD with history of PCI  No chest pain  Cancer related pain  Assessment & Plan  · Initially had uncontrolled pain due to bony metastasis  Increased oxycodone to 10 and 15 mg for moderate/severe pain and IV hydromorphone for breakthrough pain  · Seems to be well controlled  Will send in prescription for oxycodone 15 mg to hospice pharmacy    Anemia  Assessment & Plan  · Chronic anemia without blood loss    · Hemoglobin stable with most recent blood draw    Results from last 7 days   Lab Units 03/09/23  0645 03/08/23  0444 03/06/23  0524 03/05/23  0620   HEMOGLOBIN g/dL 8 5* 9 4* 8 4* 8 3*       VTE Pharmacologic Prophylaxis: VTE Score: 8 High Risk (Score >/= 5) - Pharmacological DVT Prophylaxis Ordered: heparin  Sequential Compression Devices Ordered  Patient Centered Rounds: I have performed bedside rounds with nursing staff today  Discussions with Specialists or Other Care Team Provider: Case management and hospice representative    Education and Discussions with Family / Patient: Updated  (Granddaughter) via phone  Time Spent for Care: This time was spent on one or more of the following: performing physical exam; counseling and coordination of care; obtaining or reviewing history; documenting in the medical record; reviewing/ordering tests, medications or procedures; communicating with other healthcare professionals and discussing with patient's family/caregivers  Current Length of Stay: 8 day(s)  Current Patient Status: Inpatient   Certification Statement: The patient will continue to require additional inpatient hospital stay due to Coordinating hospice for home  Discharge Plan: Anticipate discharge in 24-48 hrs to home with home services  Code Status: Level 3 - DNAR and DNI      Subjective:   Patient seen and examined  Pain currently controlled  Resting comfortably    Objective:   Vitals: Blood pressure 128/76, pulse 88, temperature 98 2 °F (36 8 °C), resp  rate 17, height 6' 4" (1 93 m), weight 85 6 kg (188 lb 11 4 oz), SpO2 99 %  Intake/Output Summary (Last 24 hours) at 3/11/2023 1729  Last data filed at 3/11/2023 1237  Gross per 24 hour   Intake 480 ml   Output 1250 ml   Net -770 ml       Physical Exam  Vitals reviewed  Constitutional:       General: He is not in acute distress  HENT:      Head: Normocephalic and atraumatic  Cardiovascular:      Rate and Rhythm: Regular rhythm  Pulmonary:      Breath sounds: Decreased breath sounds present  No rhonchi  Abdominal:      General: Bowel sounds are normal       Palpations: Abdomen is soft  Tenderness: There is no abdominal tenderness  There is no rebound  Musculoskeletal:         General: No swelling or tenderness  Cervical back: No tenderness  Skin:     General: Skin is warm and dry  Neurological:      Mental Status: He is easily aroused  Cranial Nerves: No cranial nerve deficit  Additional Data:   Labs:  Results from last 7 days   Lab Units 03/09/23  0645 03/08/23  0444 03/06/23  0524   WBC Thousand/uL 18 24* 19 25* 15 69*   HEMOGLOBIN g/dL 8 5* 9 4* 8 4*   PLATELETS Thousands/uL 248 298 237   MCV fL 92 90 91     Results from last 7 days   Lab Units 03/10/23  0643 03/09/23  0645 03/08/23  0444   SODIUM mmol/L 134* 133* 132*   POTASSIUM mmol/L 4 7 5 0 5 1   CHLORIDE mmol/L 100 100 100   CO2 mmol/L 27 26 24   ANION GAP mmol/L 7 7 8   BUN mg/dL 44* 47* 45*   CREATININE mg/dL 2 55* 2 57* 2 47*   CALCIUM mg/dL 10 5* 10 1 10 7*   ALBUMIN g/dL  --  2 9*  --    TOTAL BILIRUBIN mg/dL  --  0 34  --    ALK PHOS U/L  --  114*  --    ALT U/L  --  14  --    AST U/L  --  14  --    EGFR ml/min/1 73sq m 22 22 23   GLUCOSE RANDOM mg/dL 83 65 78                          Results from last 7 days   Lab Units 03/11/23  1622 03/11/23  1119 03/11/23  0734 03/10/23  2108 03/10/23  1632 03/10/23  1131 03/10/23  0652 03/09/23  2120 03/09/23  1641 03/09/23  1058 03/09/23  0722 03/08/23  2052   POC GLUCOSE mg/dl 162* 149* 101 97 176* 95 85 115 109 137 96 92         Results from last 7 days   Lab Units 03/08/23  0444   TSH 3RD GENERATON uIU/mL 1 930     * I Have Reviewed All Lab Data Listed Above      Cultures:                   Lines/Drains:  Invasive Devices     Peripheral Intravenous Line  Duration           Long-Dwell Peripheral IV (Midline) 03/08/23 Left 3 days          Drain  Duration           Nephrostomy Left 10 2 Fr  60 days Nephrostomy Right 10 2 Fr  60 days              Telemetry:      Imaging:  Imaging Reports Reviewed Today Include:   XR chest pa & lateral    Result Date: 3/5/2023  Impression: Similar patchy opacity of the left base compared to the PET CT of 3/1/2023, which may represent atelectasis, tumor, and/or infection/inflammation, or combination thereof  Stable small left pleural effusion compared to the PET CT of 3/1/2023  No pneumothorax   Workstation performed: [id number]       Scheduled Meds:  Current Facility-Administered Medications   Medication Dose Route Frequency Provider Last Rate   • acetaminophen  975 mg Oral Q8H PRN Mimi Arteaga PA-C     • aluminum-magnesium hydroxide-simethicone  30 mL Oral Q6H PRN Delois Eisenmenger, DO     • ARIPiprazole  2 mg Oral Daily Piyush Maurer, DO     • aspirin  81 mg Oral Daily Piyush Vic Maurer, DO     • bimatoprost  1 drop Both Eyes HS Piyush Vic Maurer, DO     • docusate sodium  100 mg Oral BID Delois Eisenmenger, DO     • ezetimibe  10 mg Oral Daily Piyush Vic Maurer, DO     • gabapentin  300 mg Oral HS Piyush Vic Maurer, DO     • heparin (porcine)  5,000 Units Subcutaneous [facility] Piyush Maurer DO     • HYDROmorphone  1 mg Intravenous Q4H PRN Sienna Silva DO     • lidocaine  1 patch Topical Daily Perico Diaz PA-C     • metoprolol tartrate  25 mg Oral Q12H [facility] Vic Maurer DO     • mirtazapine  7 5 mg Oral HS EVANGELINA Ramos     • ondansetron  4 mg Oral 4x Daily Fabiano Villanueva DO     • oxybutynin  5 mg Oral TID Delois Eisenmenger, DO     • oxyCODONE  10 mg Oral Q4H PRN Sienna Silva DO     • oxyCODONE  15 mg Oral Q4H PRN Sienna Silva DO     • pantoprazole  40 mg Oral Daily Before Breakfast Piyush Maurer DO     • polyethylene glycol  17 g Oral BID Mohan Chen PA-C     • simethicone  80 mg Oral 4x Daily PRN Piyushkolton Maurer DO         Today, Patient Was Seen By: Sienna Silva, DO    ** Please Note: Dictation voice to text software may have been used in the creation of this document   **

## 2023-03-12 LAB
GLUCOSE SERPL-MCNC: 124 MG/DL (ref 65–140)
GLUCOSE SERPL-MCNC: 151 MG/DL (ref 65–140)
GLUCOSE SERPL-MCNC: 151 MG/DL (ref 65–140)
GLUCOSE SERPL-MCNC: 165 MG/DL (ref 65–140)

## 2023-03-12 RX ORDER — BISACODYL 10 MG
10 SUPPOSITORY, RECTAL RECTAL ONCE
Status: COMPLETED | OUTPATIENT
Start: 2023-03-12 | End: 2023-03-12

## 2023-03-12 RX ADMIN — METOPROLOL TARTRATE 25 MG: 25 TABLET, FILM COATED ORAL at 20:25

## 2023-03-12 RX ADMIN — PANTOPRAZOLE SODIUM 40 MG: 40 TABLET, DELAYED RELEASE ORAL at 06:02

## 2023-03-12 RX ADMIN — BIMATOPROST 1 DROP: 0.1 SOLUTION/ DROPS OPHTHALMIC at 21:44

## 2023-03-12 RX ADMIN — OXYCODONE HYDROCHLORIDE 10 MG: 10 TABLET ORAL at 20:26

## 2023-03-12 RX ADMIN — EZETIMIBE 10 MG: 10 TABLET ORAL at 09:41

## 2023-03-12 RX ADMIN — DOCUSATE SODIUM 100 MG: 100 CAPSULE, LIQUID FILLED ORAL at 18:43

## 2023-03-12 RX ADMIN — ASPIRIN 81 MG: 81 TABLET, COATED ORAL at 09:41

## 2023-03-12 RX ADMIN — POLYETHYLENE GLYCOL 3350 17 G: 17 POWDER, FOR SOLUTION ORAL at 09:41

## 2023-03-12 RX ADMIN — OXYBUTYNIN CHLORIDE 5 MG: 5 TABLET ORAL at 20:26

## 2023-03-12 RX ADMIN — ONDANSETRON 4 MG: 4 TABLET, ORALLY DISINTEGRATING ORAL at 12:19

## 2023-03-12 RX ADMIN — ARIPIPRAZOLE 2 MG: 2 TABLET ORAL at 09:41

## 2023-03-12 RX ADMIN — HEPARIN SODIUM 5000 UNITS: 5000 INJECTION INTRAVENOUS; SUBCUTANEOUS at 15:47

## 2023-03-12 RX ADMIN — ONDANSETRON 4 MG: 4 TABLET, ORALLY DISINTEGRATING ORAL at 09:41

## 2023-03-12 RX ADMIN — OXYBUTYNIN CHLORIDE 5 MG: 5 TABLET ORAL at 09:41

## 2023-03-12 RX ADMIN — HEPARIN SODIUM 5000 UNITS: 5000 INJECTION INTRAVENOUS; SUBCUTANEOUS at 05:55

## 2023-03-12 RX ADMIN — METOPROLOL TARTRATE 25 MG: 25 TABLET, FILM COATED ORAL at 09:41

## 2023-03-12 RX ADMIN — GABAPENTIN 300 MG: 300 CAPSULE ORAL at 21:44

## 2023-03-12 RX ADMIN — ONDANSETRON 4 MG: 4 TABLET, ORALLY DISINTEGRATING ORAL at 18:43

## 2023-03-12 RX ADMIN — OXYBUTYNIN CHLORIDE 5 MG: 5 TABLET ORAL at 15:47

## 2023-03-12 RX ADMIN — MIRTAZAPINE 7.5 MG: 15 TABLET, FILM COATED ORAL at 21:44

## 2023-03-12 RX ADMIN — OXYCODONE HYDROCHLORIDE 10 MG: 10 TABLET ORAL at 05:55

## 2023-03-12 RX ADMIN — DOCUSATE SODIUM 100 MG: 100 CAPSULE, LIQUID FILLED ORAL at 09:41

## 2023-03-12 RX ADMIN — HEPARIN SODIUM 5000 UNITS: 5000 INJECTION INTRAVENOUS; SUBCUTANEOUS at 21:44

## 2023-03-12 RX ADMIN — BISACODYL 10 MG: 10 SUPPOSITORY RECTAL at 15:47

## 2023-03-12 RX ADMIN — ONDANSETRON 4 MG: 4 TABLET, ORALLY DISINTEGRATING ORAL at 21:44

## 2023-03-12 NOTE — ASSESSMENT & PLAN NOTE
Lab Results   Component Value Date    HGBA1C 6 7 (H) 03/03/2023     Recent Labs     03/11/23  1622 03/11/23  2127 03/12/23  0750 03/12/23  1112   POCGLU 162* 167* 124 151*     · Currently on low-dose glargine with sliding scale

## 2023-03-12 NOTE — ASSESSMENT & PLAN NOTE
· Stable continue abilify   · Duloxetine discontinued due to concerns for interaction with linezolid  · Will hold on restarting for now

## 2023-03-12 NOTE — ASSESSMENT & PLAN NOTE
Malnutrition Findings:   Adult Malnutrition type: Chronic illness  Adult Degree of Malnutrition: Malnutrition of moderate degree  Malnutrition Characteristics: Inadequate energy, Weight loss, Fluid accumulation  360 Statement: PCM r/t cancer with chemoradiation AEB, a 6 6% unintended wt loss from 01/02/23 to present and a 12 8% unintended wt loss from 08/26/22 to present and < 75% energy intake compared to estimated energy needs for > 1 month  BMI Findings: Body mass index is 23 08 kg/m²

## 2023-03-12 NOTE — ASSESSMENT & PLAN NOTE
· Initially had uncontrolled pain due to bony metastasis  Increased oxycodone to 10 and 15 mg for moderate/severe pain with much better control  Currently denies having any breakthrough pain  · Sent in prescription for oxycodone 15 mg to hospice pharmacy  · Family inquiring about CADD pump at home for opiate infusion    Discussed that currently symptoms are controlled with oxycodone and will consult palliative care for evaluation regarding necessity of CADD pump

## 2023-03-12 NOTE — ASSESSMENT & PLAN NOTE
· Stable at baseline    Results from last 7 days   Lab Units 03/10/23  0643 03/09/23  0645 03/08/23  0444 03/07/23  0521 03/06/23  0524   BUN mg/dL 44* 47* 45* 47* 46*   CREATININE mg/dL 2 55* 2 57* 2 47* 2 49* 2 37*   EGFR ml/min/1 73sq m 22 22 23 23 24

## 2023-03-12 NOTE — CASE MANAGEMENT
Case Management Discharge Planning Note    Patient name Lord Brewer  Location [facility] /E5 -[id number]-* MRN [id number]  : 1945 Date 3/12/2023       Current Admission Date: 3/3/2023  Current Admission Diagnosis:SIRS (systemic inflammatory response syndrome) [facility])   Patient Active Problem List    Diagnosis Date Noted   • Depression 2023   • Hyponatremia 2023   • Moderate protein-calorie malnutrition ([facility] ) 2023   • SIRS (systemic inflammatory response syndrome) ([facility] ) 2023   • CAD (coronary artery disease) 2023   • Severe protein-calorie malnutrition ([facility] ) 2023   • History of pleural effusion 2023   • Nephrostomy status ([facility] ) 2023   • Acute on chronic blood loss anemia 2023   • Bacteremia due to Enterobacter species 2023   • Elevated brain natriuretic peptide (BNP) level 2023   • Ambulatory dysfunction 2023   • Influenza A 2023   • Elevated troponin 2023   • Right sided weakness 2022   • Stroke-like symptoms 2022   • Symptomatic hypotension 2022   • Insomnia 2022   • Right wrist drop 2022   • Chronic pleural effusion 2022   • Urinary tract infection 2022   • SOB (shortness of breath) 2022   • History of tobacco use disorder 2022   • Retroperitoneal lymphadenopathy 2022   • Pulmonary nodules 2022   • Syncope and collapse 2022   • Depression with suicidal ideation 2022   • Cancer related pain 2022   • Bilateral hydronephrosis 04/10/2022   • Stage 4 chronic kidney disease ([facility] [address] ) 2022   • Fall 2022   • Hyperkalemia 2022   • Retained ureteral stent    • 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[facility] ) 2022   • Embolism and 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 ([facility] ) 2022   • Abnormal CT scan, bladder 2022   • Port-A-Cath in place 2022   • Continuous opioid dependence ([facility] ) 2021   • [facility], [facility] or [facility] [facility], [facility] or [facility] [facility], [facility] or [facility] Hematuria 10/24/2021   • Acute urinary retention 10/21/2021   • Chronic pain syndrome 05/17/2021   • Lumbar spondylosis    • [facility] bronchitis [facility] 03/02/2021   • Moderate major depression, single episode ([facility] ) 03/02/2021   • Thrombocytopenia ([facility] ) 03/02/2021   • 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 03/02/2021   • Gross hematuria 02/09/2021   • PAD (peripheral artery disease) ([facility])    • Left carotid bruit    • Anemia of chronic disease 12/19/2020   • Preoperative clearance 12/19/2020   • Anemia 10/10/2020   • Diabetic ulcer of left foot associated with type 2 diabetes mellitus, 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 ([facility] ) 10/08/2020   • Acute kidney injury superimposed on CKD ([place] [address] )    • Dysuria 08/17/2020   • Diabetic polyneuropathy associated with type 2 diabetes mellitus ([place] [address] ) 07/16/2020   • Abnormal MRI, lumbar spine 06/29/2020   • Malignant neoplasm of anterior wall of urinary bladder ([place] [address] )    • Secondary renal hyperparathyroidism ([facility] ) 02/12/2020   • Preop examination 04/16/2019   • Superficial phlebitis 03/07/2019   • Arteriosclerosis of artery of extremity ([place] [address] ) 02/22/2019   • Stable angina pectoris ([place] [address] ) 02/22/2019   • Herpes zoster without complication 21/28/0344   • Localized edema 02/22/2019   • Back pain 01/31/2019   • Degeneration of lumbar intervertebral disc 12/03/2018   • Primary osteoarthritis of left knee 03/16/2018   • Bladder carcinoma ([facility] ) 08/16/2016   • Presence of stent in coronary artery 08/16/2016   • Hypertension with renal disease 10/29/2015   • Hyperlipidemia 10/29/2015   • Cystitis due to intravesical BCG administration 09/24/2015   • Coronary artery disease of native artery of native heart with stable angina pectoris ([facility] ) 05/19/2011   • Type 2 diabetes mellitus, with long-term current use of insulin ([place] [address] ) 01/09/2004      LOS (days): 9  Geometric Mean LOS (GMLOS) (days): 3 50  Days to GMLOS:-5 3     OBJECTIVE:  Risk of Unplanned Readmission Score: 64 56         Current admission status: Inpatient   Preferred Pharmacy:   [facility] [address]   [place] [address]  Phone: [phone] Fax: [address], [address]  [address] [place] [address]  Phone: [phone] Fax: [phone]    [address], [address]  [address] [place] [address] [place] [address]  Phone: [phone] Fax: [phone]    Primary Care Provider: Kathe Shelton MD    Primary Insurance: MEDICARE  Secondary Insurance: BLUE CROSS    DISCHARGE DETAILS:    Discharge planning discussed with[de-identified] [facility] Ghazala Hartley    Additional Comments: [facility] an [facility]  Ghazalatony Omalleydestiny reports she spoke w/ Pts GDtr Niki Pichardo this morning regarding Pts pain management  CM provided the update that Palliative has been consulted to review Pt for need of a CAD Pump, which has been requested by the GDtr  Roxann to assess Pts pain regimine on Monday  CM will anticipate discharge Monday v Tuesday, coordinating with family and [facility] admission nurse  CM continues to follow

## 2023-03-12 NOTE — TREATMENT PLAN
Notified by nursing that granddaughter Benito Millan requested call  Attempted to call [phone]  No answer and left brief voicemail

## 2023-03-12 NOTE — ASSESSMENT & PLAN NOTE
· SIRS with pyuria on admission secondary to VRE based on outside urine culture on 2/27/2023  · Completed course of linezolid per ID  · Blood cultures negative; repeated urine cultures on admission mixed contaminants  · Chronic leukocytosis which after multidisciplinary consultation with ID pulmonology and oncology no evidence of acute infection is found  · The patient has widespread metastasis and has opted for hospice/comfort care       Results from last 7 days   Lab Units 03/09/23  0645 03/08/23  0444 03/06/23  0524   WBC Thousand/uL 18 24* 19 25* 15 69*

## 2023-03-12 NOTE — ASSESSMENT & PLAN NOTE
· Chronic anemia without blood loss    · Hemoglobin stable with most recent blood draw    Results from last 7 days   Lab Units 03/09/23  0645 03/08/23  0444 03/06/23  0524   HEMOGLOBIN g/dL 8 5* 9 4* 8 4*

## 2023-03-12 NOTE — PROGRESS NOTES
[address] [facility]  Progress Note - Ajay Pearson 1945, [de-identified] y o  male MRN: [id number]  Unit/Bed#: E5 -01 Encounter: [id number]  Primary Care Provider: Fuentes rGacia MD   Date and time admitted to hospital: 3/3/2023  2:31 PM    * SIRS (systemic inflammatory response syndrome) ([facility] )  Assessment & Plan  · SIRS with pyuria on admission secondary to VRE based on outside urine culture on 2/27/2023  · Completed course of linezolid per ID  · Blood cultures negative; repeated urine cultures on admission mixed contaminants  · Chronic leukocytosis which after multidisciplinary consultation with ID pulmonology and oncology no evidence of acute infection is found  · The patient has widespread metastasis and has opted for hospice/comfort care  Results from last 7 days   Lab Units 03/09/23  0645 03/08/23  0444 03/06/23  0524   WBC Thousand/uL 18 24* 19 25* 15 69*       Cancer related pain  Assessment & Plan  · Initially had uncontrolled pain due to bony metastasis  Increased oxycodone to 10 and 15 mg for moderate/severe pain with much better control  Currently denies having any breakthrough pain  · Sent in prescription for oxycodone 15 mg to hospice pharmacy  · Family inquiring about CADD pump at home for opiate infusion  Discussed that currently symptoms are controlled with oxycodone and will consult palliative care for evaluation regarding necessity of CADD pump    Bladder carcinoma ([facility] 75 )  Assessment & Plan  · Invasive carcinoma of bladder known to urology service  · No evidence of bleeding this admission  · Most recent PET scan demonstrates advancement of metastatic disease  · After multidisciplinary approach, patient wishes to proceed with comfort care/hospice at this time    · Currently coordinating care with hospice services case management and family    Depression  Assessment & Plan  · Stable continue abilify   · Duloxetine discontinued due to concerns for interaction with linezolid  · Will hold on restarting for now  Hyponatremia  Assessment & Plan  · Nonspecific hyponatremia likely secondary to poor intake  Duloxetine discontinued  · Urology consulted for assistance  Started on gentle IV fluids    Results from last 7 days   Lab Units 03/10/23  0643 03/09/23  0645 03/08/23  0444 03/07/23  0521   SODIUM mmol/L 134* 133* 132* 133*       Moderate protein-calorie malnutrition (HCC)  Assessment & Plan  Malnutrition Findings:   Adult Malnutrition type: Chronic illness  Adult Degree of Malnutrition: Malnutrition of moderate degree  Malnutrition Characteristics: Inadequate energy, Weight loss, Fluid accumulation  360 Statement: PCM r/t cancer with chemoradiation AEB, a 6 6% unintended wt loss from 01/02/23 to present and a 12 8% unintended wt loss from 08/26/22 to present and < 75% energy intake compared to estimated energy needs for > 1 month  BMI Findings: Body mass index is 23 08 kg/m²  CAD (coronary artery disease)  Assessment & Plan  · CAD with history of PCI  No chest pain  Bilateral hydronephrosis  Assessment & Plan  · Bilateral hydronephrosis secondary to bladder cancer with bilateral nephrostomy tubes in place  · No new urology recommendations at this time    Stage 4 chronic kidney disease ([facility] )  Assessment & Plan  · Stable at baseline    Results from last 7 days   Lab Units 03/10/23  0643 03/09/23  0645 03/08/23  0444 03/07/23  0521 03/06/23  0524   BUN mg/dL 44* 47* 45* 47* 46*   CREATININE mg/dL 2 55* 2 57* 2 47* 2 49* 2 37*   EGFR ml/min/1 73sq  22 22 23 23 24       Anemia  Assessment & Plan  · Chronic anemia without blood loss    · Hemoglobin stable with most recent blood draw    Results from last 7 days   Lab Units 03/09/23  0645 03/08/23  0444 03/06/23  0524   HEMOGLOBIN g/dL 8 5* 9 4* 8 4*       Type 2 diabetes mellitus, with long-term current use of insulin [facility])  Assessment & Plan  Lab Results   Component Value Date    HGBA1C 6 7 (H) 03/03/2023     Recent Labs     03/11/23  1622 03/11/23  2127 03/12/23  0750 03/12/23  1112   POCGLU 162* 167* 124 151*     · Currently on low-dose glargine with sliding scale      VTE Pharmacologic Prophylaxis: VTE Score: 8 High Risk (Score >/= 5) - Pharmacological DVT Prophylaxis Ordered: heparin  Sequential Compression Devices Ordered  Patient Centered Rounds: I have performed bedside rounds with nursing staff today  Discussions with Specialists or Other Care Team Provider: Case management    Education and Discussions with Family / Patient: Granddaughter Rivas Potter yesterday        Time Spent for Care: This time was spent on one or more of the following: performing physical exam; counseling and coordination of care; obtaining or reviewing history; documenting in the medical record; reviewing/ordering tests, medications or procedures; communicating with other healthcare professionals and discussing with patient's family/caregivers  Current Length of Stay: 9 day(s)  Current Patient Status: Inpatient   Certification Statement: The patient will continue to require additional inpatient hospital stay due to palliative care evaluation in regards to CADD pump necessity  Discharge Plan: Anticipate discharge tomorrow to Home with hospice    Code Status: Level 3 - DNAR and DNI      Subjective:   Patient seen and examined  Pain controlled, no new complaints  Objective:   Vitals: Blood pressure 145/93, pulse 101, temperature 98 6 °F (37 °C), temperature source Oral, resp  rate 16, height 6' 4" (1 93 m), weight 86 kg (189 lb 9 5 oz), SpO2 95 %  Intake/Output Summary (Last 24 hours) at 3/12/2023 1234  Last data filed at 3/12/2023 0940  Gross per 24 hour   Intake 720 ml   Output 1475 ml   Net -755 ml       Physical Exam  Vitals reviewed  Constitutional:       General: He is not in acute distress  HENT:      Head: Atraumatic  Cardiovascular:      Rate and Rhythm: Regular rhythm  Heart sounds: Normal heart sounds     Pulmonary: Effort: Pulmonary effort is normal       Breath sounds: Decreased breath sounds present  No wheezing  Abdominal:      General: Bowel sounds are normal       Palpations: Abdomen is soft  Tenderness: There is no abdominal tenderness  There is no rebound  Musculoskeletal:         General: No swelling or tenderness  Skin:     General: Skin is warm and dry  Neurological:      General: No focal deficit present  Mental Status: He is alert  Cranial Nerves: No cranial nerve deficit  Psychiatric:         Mood and Affect: Mood normal        Additional Data:   Labs:  Results from last 7 days   Lab Units 03/09/23  0645 03/08/23  0444 03/06/23  0524   WBC Thousand/uL 18 24* 19 25* 15 69*   HEMOGLOBIN g/dL 8 5* 9 4* 8 4*   PLATELETS Thousands/uL 248 298 237   MCV fL 92 90 91     Results from last 7 days   Lab Units 03/10/23  0643 03/09/23  0645 03/08/23  0444   SODIUM mmol/L 134* 133* 132*   POTASSIUM mmol/L 4 7 5 0 5 1   CHLORIDE mmol/L 100 100 100   CO2 mmol/L 27 26 24   ANION GAP mmol/L 7 7 8   BUN mg/dL 44* 47* 45*   CREATININE mg/dL 2 55* 2 57* 2 47*   CALCIUM mg/dL 10 5* 10 1 10 7*   ALBUMIN g/dL  --  2 9*  --    TOTAL BILIRUBIN mg/dL  --  0 34  --    ALK PHOS U/L  --  114*  --    ALT U/L  --  14  --    AST U/L  --  14  --    EGFR ml/min/1 73sq m 22 22 23   GLUCOSE RANDOM mg/dL 83 65 78                          Results from last 7 days   Lab Units 03/12/23  1112 03/12/23  0750 03/11/23 2127 03/11/23  1622 03/11/23  1119 03/11/23  0734 03/10/23  2108 03/10/23  1632 03/10/23  1131 03/10/23  0652 03/09/23  2120 03/09/23  1641   POC GLUCOSE mg/dl 151* 124 167* 162* 149* 101 97 176* 95 85 115 109         Results from last 7 days   Lab Units 03/08/23  0444   TSH 3RD GENERATON uIU/mL 1 930     * I Have Reviewed All Lab Data Listed Above      Cultures:                   Lines/Drains:  Invasive Devices     Peripheral Intravenous Line  Duration           Long-Dwell Peripheral IV (Midline) 03/08/23 Left 4 days Drain  Duration           Nephrostomy Left 10 2 Fr  60 days    Nephrostomy Right 10 2 Fr  60 days              Telemetry:      Imaging:  Imaging Reports Reviewed Today Include:   XR chest pa & lateral    Result Date: 3/5/2023  Impression: Similar patchy opacity of the left base compared to the PET CT of 3/1/2023, which may represent atelectasis, tumor, and/or infection/inflammation, or combination thereof  Stable small left pleural effusion compared to the PET CT of 3/1/2023  No pneumothorax   Workstation performed: [id number]       Scheduled Meds:  Current Facility-Administered Medications   Medication Dose Route Frequency Provider Last Rate   • acetaminophen  975 mg Oral Q8H PRN Lizeth Steinberg PA-C     • aluminum-magnesium hydroxide-simethicone  30 mL Oral Q6H PRN Simin Vang DO     • ARIPiprazole  2 mg Oral Daily [facility], DO     • aspirin  81 mg Oral Daily [facility], DO     • bimatoprost  1 drop Both Eyes HS [facility], DO     • docusate sodium  100 mg Oral BID Simin Vang DO     • ezetimibe  10 mg Oral Daily [facility], DO     • gabapentin  300 mg Oral HS [facility], DO     • heparin (porcine)  5,000 Units Subcutaneous [facility], DO     • HYDROmorphone  1 mg Intravenous Q4H PRN Connor Johnson DO     • lidocaine  1 patch Topical Daily Perico Diaz PA-C     • metoprolol tartrate  25 mg Oral Q12H [facility], DO     • mirtazapine  7 5 mg Oral HS EVANGELINA Ramos     • ondansetron  4 mg Oral 4x Daily Fabiano Villanueva DO     • oxybutynin  5 mg Oral TID Simin Vang DO     • oxyCODONE  10 mg Oral Q4H PRN Connor Elizabeth DO     • oxyCODONE  15 mg Oral Q4H PRN Connor Elizabeth DO     • pantoprazole  40 mg Oral Daily Before Breakfast [facility] Vipul, DO     • polyethylene glycol  17 g Oral BID Ariane Meade PA-C     • simethicone  80 mg Oral 4x Daily PRN [facility] Katiuska DO         Today, Patient Was Seen By: Connor Johnson DO    ** Please Note: Dictation voice to text software may have been used in the creation of this document   **

## 2023-03-12 NOTE — PLAN OF CARE
Problem: Potential for Falls  Goal: Patient will remain free of falls  Description: INTERVENTIONS:  - Educate patient/family on patient safety including physical limitations  - Instruct patient to call for assistance with activity   - Consult OT/PT to assist with strengthening/mobility   - Keep Call bell within reach  - Keep bed low and locked with side rails adjusted as appropriate  - Keep care items and personal belongings within reach  - Initiate and maintain comfort rounds  - Make Fall Risk Sign visible to staff  - Apply yellow socks and bracelet for high fall risk patients  - Consider moving patient to room near nurses station  Outcome: Progressing     Problem: Nutrition/Hydration-ADULT  Goal: Nutrient/Hydration intake appropriate for improving, restoring or maintaining nutritional needs  Description: Monitor and assess patient's nutrition/hydration status for malnutrition  Collaborate with interdisciplinary team and initiate plan and interventions as ordered  Monitor patient's weight and dietary intake as ordered or per policy  Utilize nutrition screening tool and intervene as necessary  Determine patient's food preferences and provide high-protein, high-caloric foods as appropriate       INTERVENTIONS:  - Monitor oral intake, urinary output, labs, and treatment plans  - Assess nutrition and hydration status and recommend course of action  - Evaluate amount of meals eaten  - Assist patient with eating if necessary   - Allow adequate time for meals  - Recommend/ encourage appropriate diets, oral nutritional supplements, and vitamin/mineral supplements  - Order, calculate, and assess calorie counts as needed  - Recommend, monitor, and adjust tube feedings and TPN/PPN based on assessed needs  - Assess need for intravenous fluids  - Provide specific nutrition/hydration education as appropriate  - Include patient/family/caregiver in decisions related to nutrition  Outcome: Progressing     Problem: MOBILITY - ADULT  Goal: Maintain or return to baseline ADL function  Description: INTERVENTIONS:  - Educate patient/family on patient safety including physical limitations  - Instruct patient to call for assistance with activity   - Consult OT/PT to assist with strengthening/mobility   - Keep Call bell within reach  - Keep bed low and locked with side rails adjusted as appropriate  - Keep care items and personal belongings within reach  - Initiate and maintain comfort rounds  - Make Fall Risk Sign visible to staff  - Apply yellow socks and bracelet for high fall risk patients  - Consider moving patient to room near nurses station  Outcome: Progressing  Goal: Maintains/Returns to pre admission functional level  Description: INTERVENTIONS:  - Perform BMAT or MOVE assessment daily    - Set and communicate daily mobility goal to care team and patient/family/caregiver  - Collaborate with rehabilitation services on mobility goals if consulted  - Perform Range of Motion  times a day  - Reposition patient every  hours    - Dangle patient  times a day  - Stand patient  times a day  - Ambulate patient  times a day  - Out of bed to chair  times a day   - Out of bed for meals  times a day  - Out of bed for toileting  - Record patient progress and toleration of activity level   Outcome: Progressing     Problem: Prexisting or High Potential for Compromised Skin Integrity  Goal: Skin integrity is maintained or improved  Description: INTERVENTIONS:  - Identify patients at risk for skin breakdown  - Assess and monitor skin integrity  - Assess and monitor nutrition and hydration status  - Monitor labs   - Assess for incontinence   - Turn and reposition patient  - Assist with mobility/ambulation  - Relieve pressure over bony prominences  - Avoid friction and shearing  - Provide appropriate hygiene as needed including keeping skin clean and dry  - Evaluate need for skin moisturizer/barrier cream  - Collaborate with interdisciplinary team   - Patient/family teaching  - Consider wound care consult   Outcome: Progressing     Problem: GENITOURINARY - ADULT  Goal: Maintains or returns to baseline urinary function  Description: INTERVENTIONS:  - Assess urinary function  - Encourage oral fluids to ensure adequate hydration if ordered  - Administer IV fluids as ordered to ensure adequate hydration  - Administer ordered medications as needed  - Offer frequent toileting  - Follow urinary retention protocol if ordered  Outcome: Progressing     Problem: METABOLIC, FLUID AND ELECTROLYTES - ADULT  Goal: Electrolytes maintained within normal limits  Description: INTERVENTIONS:  - Monitor labs and assess patient for signs and symptoms of electrolyte imbalances  - Administer electrolyte replacement as ordered  - Monitor response to electrolyte replacements, including repeat lab results as appropriate  - Instruct patient on fluid and nutrition as appropriate  Outcome: Progressing  Goal: Fluid balance maintained  Description: INTERVENTIONS:  - Monitor labs   - Monitor I/O and WT  - Instruct patient on fluid and nutrition as appropriate  - Assess for signs & symptoms of volume excess or deficit  Outcome: Progressing  Goal: Glucose maintained within target range  Description: INTERVENTIONS:  - Monitor Blood Glucose as ordered  - Assess for signs and symptoms of hyperglycemia and hypoglycemia  - Administer ordered medications to maintain glucose within target range  - Assess nutritional intake and initiate nutrition service referral as needed  Outcome: Progressing     Problem: SKIN/TISSUE INTEGRITY - ADULT  Goal: Skin Integrity remains intact(Skin Breakdown Prevention)  Description: Assess:  -Perform Ortega assessment every shift and prn  -Inspect skin when repositioning, toileting, and assisting with ADLS  -Assess extremities for adequate circulation and sensation     Bed Management:  -Have minimal linens on bed & keep smooth, unwrinkled  -Change linens as needed when moist or perspiring      Activity:  -Encourage activity and walks on unit  -Encourage or provide ROM exercises  -Use appropriate equipment to lift or move patient in bed    Skin Care:  -Avoid use of baby powder, tape, friction and shearing, hot water or constrictive clothing  -Do not massage red bony areas    Outcome: Progressing  Goal: Pressure injury heals and does not worsen  Description: Interventions:  - Implement low air loss mattress or specialty surface (Criteria met)  - Apply silicone foam dressing  - Apply fecal or urinary incontinence containment device   - Utilize friction reducing device or surface for transfers   - Consider nutrition services referral as needed  Outcome: Progressing     Problem: PAIN - ADULT  Goal: Verbalizes/displays adequate comfort level or baseline comfort level  Description: Interventions:  - Encourage patient to monitor pain and request assistance  - Assess pain using appropriate pain scale  - Administer analgesics based on type and severity of pain and evaluate response  - Implement non-pharmacological measures as appropriate and evaluate response  - Consider cultural and social influences on pain and pain management  - Notify physician/advanced practitioner if interventions unsuccessful or patient reports new pain  Outcome: Progressing     Problem: INFECTION - ADULT  Goal: Absence or prevention of progression during hospitalization  Description: INTERVENTIONS:  - Assess and monitor for signs and symptoms of infection  - Monitor lab/diagnostic results  - Monitor all insertion sites, i e  indwelling lines, tubes, and drains  - Monitor endotracheal if appropriate and nasal secretions for changes in amount and color  - [place] appropriate cooling/warming therapies per order  - Administer medications as ordered  - Instruct and encourage patient and family to use good hand hygiene technique  - Identify and instruct in appropriate isolation precautions for identified infection/condition  Outcome: Progressing     Problem: SAFETY ADULT  Goal: Patient will remain free of falls  Description: INTERVENTIONS:  - Educate patient/family on patient safety including physical limitations  - Instruct patient to call for assistance with activity   - Consult OT/PT to assist with strengthening/mobility   - Keep Call bell within reach  - Keep bed low and locked with side rails adjusted as appropriate  - Keep care items and personal belongings within reach  - Initiate and maintain comfort rounds  - Make Fall Risk Sign visible to staff  - Apply yellow socks and bracelet for high fall risk patients  - Consider moving patient to room near nurses station  Outcome: Progressing  Goal: Maintain or return to baseline ADL function  Description: INTERVENTIONS:  - Educate patient/family on patient safety including physical limitations  - Instruct patient to call for assistance with activity   - Consult OT/PT to assist with strengthening/mobility   - Keep Call bell within reach  - Keep bed low and locked with side rails adjusted as appropriate  - Keep care items and personal belongings within reach  - Initiate and maintain comfort rounds  - Make Fall Risk Sign visible to staff  - Apply yellow socks and bracelet for high fall risk patients  - Consider moving patient to room near nurses station  Outcome: Progressing  Goal: Maintains/Returns to pre admission functional level  Description: INTERVENTIONS:  - Perform BMAT or MOVE assessment daily    - Set and communicate daily mobility goal to care team and patient/family/caregiver  - Collaborate with rehabilitation services on mobility goals if consulted  - Perform Range of Motion  times a day  - Reposition patient every  hours    - Dangle patient  times a day  - Stand patient  times a day  - Ambulate patient  times a day  - Out of bed to chair  times a day   - Out of bed for meals times a day  - Out of bed for toileting  - Record patient progress and toleration of activity level   Outcome: Progressing

## 2023-03-13 ENCOUNTER — PATIENT OUTREACH (OUTPATIENT)
Dept: CASE MANAGEMENT | Facility: HOSPITAL | Age: 78
End: 2023-03-13

## 2023-03-13 VITALS
DIASTOLIC BLOOD PRESSURE: 75 MMHG | TEMPERATURE: 97.7 F | WEIGHT: 189.6 LBS | HEART RATE: 82 BPM | SYSTOLIC BLOOD PRESSURE: 125 MMHG | HEIGHT: 76 IN | RESPIRATION RATE: 16 BRPM | OXYGEN SATURATION: 98 % | BODY MASS INDEX: 23.09 KG/M2

## 2023-03-13 LAB
GLUCOSE SERPL-MCNC: 119 MG/DL (ref 65–140)
GLUCOSE SERPL-MCNC: 129 MG/DL (ref 65–140)

## 2023-03-13 RX ORDER — LIDOCAINE 50 MG/G
1 PATCH TOPICAL DAILY
Qty: 14 PATCH | Refills: 0 | Status: SHIPPED | OUTPATIENT
Start: 2023-03-14

## 2023-03-13 RX ORDER — NALOXONE HYDROCHLORIDE 4 MG/.1ML
SPRAY NASAL
Qty: 1 EACH | Refills: 0 | Status: SHIPPED | OUTPATIENT
Start: 2023-03-13

## 2023-03-13 RX ORDER — OXYCODONE HYDROCHLORIDE 15 MG/1
15 TABLET ORAL EVERY 4 HOURS
Qty: 15 TABLET | Refills: 0 | Status: SHIPPED | OUTPATIENT
Start: 2023-03-13 | End: 2023-03-13 | Stop reason: SDUPTHER

## 2023-03-13 RX ORDER — MIRTAZAPINE 7.5 MG/1
7.5 TABLET, FILM COATED ORAL
Qty: 1 TABLET | Refills: 0 | Status: SHIPPED | OUTPATIENT
Start: 2023-03-13 | End: 2023-03-13 | Stop reason: SDUPTHER

## 2023-03-13 RX ORDER — HYDROMORPHONE HYDROCHLORIDE 1 MG/ML
2 SOLUTION ORAL EVERY 4 HOURS PRN
Qty: 48 ML | Refills: 0 | Status: SHIPPED | OUTPATIENT
Start: 2023-03-13 | End: 2023-03-23

## 2023-03-13 RX ORDER — NALOXONE HYDROCHLORIDE 4 MG/.1ML
SPRAY NASAL
Qty: 1 EACH | Refills: 0 | Status: SHIPPED | OUTPATIENT
Start: 2023-03-13 | End: 2023-03-13 | Stop reason: SDUPTHER

## 2023-03-13 RX ORDER — OXYCODONE HYDROCHLORIDE 15 MG/1
15 TABLET ORAL EVERY 4 HOURS
Qty: 15 TABLET | Refills: 0 | Status: SHIPPED | OUTPATIENT
Start: 2023-03-13 | End: 2023-03-23

## 2023-03-13 RX ORDER — HYDROMORPHONE HYDROCHLORIDE 1 MG/ML
2 SOLUTION ORAL EVERY 4 HOURS PRN
Qty: 48 ML | Refills: 0 | Status: SHIPPED | OUTPATIENT
Start: 2023-03-13 | End: 2023-03-13 | Stop reason: SDUPTHER

## 2023-03-13 RX ORDER — BISACODYL 10 MG
10 SUPPOSITORY, RECTAL RECTAL DAILY
Qty: 12 SUPPOSITORY | Refills: 0 | Status: SHIPPED | OUTPATIENT
Start: 2023-03-13

## 2023-03-13 RX ORDER — ONDANSETRON 4 MG/1
4 TABLET, ORALLY DISINTEGRATING ORAL 4 TIMES DAILY
Qty: 20 TABLET | Refills: 0 | Status: SHIPPED | OUTPATIENT
Start: 2023-03-13 | End: 2023-03-13 | Stop reason: SDUPTHER

## 2023-03-13 RX ORDER — BISACODYL 10 MG
10 SUPPOSITORY, RECTAL RECTAL DAILY
Qty: 12 SUPPOSITORY | Refills: 0 | Status: SHIPPED | OUTPATIENT
Start: 2023-03-13 | End: 2023-03-13 | Stop reason: SDUPTHER

## 2023-03-13 RX ORDER — LIDOCAINE 50 MG/G
1 PATCH TOPICAL DAILY
Qty: 14 PATCH | Refills: 0 | Status: SHIPPED | OUTPATIENT
Start: 2023-03-14 | End: 2023-03-13 | Stop reason: SDUPTHER

## 2023-03-13 RX ORDER — MIRTAZAPINE 7.5 MG/1
7.5 TABLET, FILM COATED ORAL
Qty: 1 TABLET | Refills: 0 | Status: SHIPPED | OUTPATIENT
Start: 2023-03-13 | End: 2023-03-15 | Stop reason: SDUPTHER

## 2023-03-13 RX ORDER — ONDANSETRON 4 MG/1
4 TABLET, ORALLY DISINTEGRATING ORAL 4 TIMES DAILY
Qty: 20 TABLET | Refills: 0 | Status: SHIPPED | OUTPATIENT
Start: 2023-03-13

## 2023-03-13 RX ADMIN — POLYETHYLENE GLYCOL 3350 17 G: 17 POWDER, FOR SOLUTION ORAL at 09:05

## 2023-03-13 RX ADMIN — OXYBUTYNIN CHLORIDE 5 MG: 5 TABLET ORAL at 09:05

## 2023-03-13 RX ADMIN — LIDOCAINE 5% 1 PATCH: 700 PATCH TOPICAL at 09:04

## 2023-03-13 RX ADMIN — DOCUSATE SODIUM 100 MG: 100 CAPSULE, LIQUID FILLED ORAL at 09:05

## 2023-03-13 RX ADMIN — PANTOPRAZOLE SODIUM 40 MG: 40 TABLET, DELAYED RELEASE ORAL at 06:36

## 2023-03-13 RX ADMIN — HYDROMORPHONE HYDROCHLORIDE 1 MG: 1 INJECTION, SOLUTION INTRAMUSCULAR; INTRAVENOUS; SUBCUTANEOUS at 07:49

## 2023-03-13 RX ADMIN — ARIPIPRAZOLE 2 MG: 2 TABLET ORAL at 09:05

## 2023-03-13 RX ADMIN — ASPIRIN 81 MG: 81 TABLET, COATED ORAL at 09:05

## 2023-03-13 RX ADMIN — OXYCODONE HYDROCHLORIDE 10 MG: 10 TABLET ORAL at 05:20

## 2023-03-13 RX ADMIN — ONDANSETRON 4 MG: 4 TABLET, ORALLY DISINTEGRATING ORAL at 12:38

## 2023-03-13 RX ADMIN — HEPARIN SODIUM 5000 UNITS: 5000 INJECTION INTRAVENOUS; SUBCUTANEOUS at 05:20

## 2023-03-13 RX ADMIN — EZETIMIBE 10 MG: 10 TABLET ORAL at 09:05

## 2023-03-13 RX ADMIN — ONDANSETRON 4 MG: 4 TABLET, ORALLY DISINTEGRATING ORAL at 09:05

## 2023-03-13 RX ADMIN — METOPROLOL TARTRATE 25 MG: 25 TABLET, FILM COATED ORAL at 09:05

## 2023-03-13 NOTE — ASSESSMENT & PLAN NOTE
· Stable at baseline    Results from last 7 days   Lab Units 03/10/23  0643 03/09/23  0645 03/08/23  0444 03/07/23  0521   BUN mg/dL 44* 47* 45* 47*   CREATININE mg/dL 2 55* 2 57* 2 47* 2 49*   EGFR ml/min/1 73sq m 22 22 23 23

## 2023-03-13 NOTE — CASE MANAGEMENT
Case Management Discharge Planning Note    Patient name Albino Choudhury  Location [facility]  /[id number] -[id number] MRN [id number]  : 1945 Date 3/13/2023       Current Admission Date: 3/3/2023  Current Admission Diagnosis:SIRS (systemic inflammatory response syndrome) [facility])   Patient Active Problem List    Diagnosis Date Noted   • Depression 2023   • Hyponatremia 2023   • Moderate protein-calorie malnutrition ([facility] ) 2023   • SIRS (systemic inflammatory response syndrome) ([facility] ) 2023   • CAD (coronary artery disease) 2023   • Severe protein-calorie malnutrition ([facility] ) 2023   • History of pleural effusion 2023   • Nephrostomy status ([facility] ) 2023   • Acute on chronic blood loss anemia 2023   • Bacteremia due to Enterobacter species 2023   • Elevated brain natriuretic peptide (BNP) level 2023   • Ambulatory dysfunction 2023   • Influenza A 2023   • Elevated troponin 2023   • Right sided weakness 2022   • Stroke-like symptoms 2022   • Symptomatic hypotension 2022   • Insomnia 2022   • Right wrist drop 2022   • Chronic pleural effusion 2022   • Urinary tract infection 2022   • SOB (shortness of breath) 2022   • History of tobacco use disorder 2022   • Retroperitoneal lymphadenopathy 2022   • Pulmonary nodules 2022   • Syncope and collapse 2022   • Depression with suicidal ideation 2022   • Cancer related pain 2022   • Bilateral hydronephrosis 04/10/2022   • Stage 4 chronic kidney disease ([address] ) 2022   • Fall 2022   • Hyperkalemia 2022   • Retained ureteral stent    • 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[facility] ) 2022   • Embolism and 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 ([facility] ) 2022   • Abnormal CT scan, bladder 2022   • Port-A-Cath in place 2022   • Continuous opioid dependence ([facility] ) 2021   • Hematuria 10/24/2021   • Acute urinary retention 10/21/2021   • Chronic pain syndrome 05/17/2021   • Lumbar spondylosis    • [facility] bronchitis ([facility] ) 03/02/2021   • Moderate major depression, single episode ([facility] ) 03/02/2021   • Thrombocytopenia ([facility] ) 03/02/2021   • 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 03/02/2021   • Gross hematuria 02/09/2021   • PAD (peripheral artery disease) ([facility])    • Left carotid bruit    • Anemia of chronic disease 12/19/2020   • Preoperative clearance 12/19/2020   • Anemia 10/10/2020   • Diabetic ulcer of left foot associated with type 2 diabetes mellitus, 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 ([facility] ) 10/08/2020   • Acute kidney injury superimposed on CKD ([place] [address] )    • Dysuria 08/17/2020   • Diabetic polyneuropathy associated with type 2 diabetes mellitus ([place] [address] ) 07/16/2020   • Abnormal MRI, lumbar spine 06/29/2020   • Malignant neoplasm of anterior wall of urinary bladder ([place] [address] )    • Secondary renal hyperparathyroidism ([facility] ) 02/12/2020   • Preop examination 04/16/2019   • Superficial phlebitis 03/07/2019   • Arteriosclerosis of artery of extremity ([facility] ) 02/22/2019   • Stable angina pectoris ([place] [address] ) 02/22/2019   • Herpes zoster without complication 19/90/0787   • Localized edema 02/22/2019   • Back pain 01/31/2019   • Degeneration of lumbar intervertebral disc 12/03/2018   • Primary osteoarthritis of left knee 03/16/2018   • Bladder carcinoma ([facility] ) 08/16/2016   • Presence of stent in coronary artery 08/16/2016   • Hypertension with renal disease 10/29/2015   • Hyperlipidemia 10/29/2015   • Cystitis due to intravesical BCG administration 09/24/2015   • Coronary artery disease of native artery of native heart with stable angina pectoris ([facility] ) 05/19/2011   • Type 2 diabetes mellitus, with long-term current use of insulin ([place] [address] ) 01/09/2004      LOS (days): 10  Geometric Mean LOS (GMLOS) (days): 3 50  Days to GMLOS:-6 3     OBJECTIVE:  Risk of Unplanned Readmission Score: 61 56         Current admission status: Inpatient   Preferred Pharmacy:   [facility] [address], [address]   [facility] [address]  Phone: [phone] Fax: [address], [address]  [address] [place] [address]  Phone: [phone] Fax: [phone]    [address], [address]  [address] [facility],[address] [place] [address]  Phone: [phone] Fax: [phone]    Primary Care Provider: Nava Wilson MD    Primary Insurance: MEDICARE  Secondary Insurance: BLUE CROSS    DISCHARGE DETAILS:    Freedom of Choice: Yes  Comments - Freedom of Choice: Pt discharging home today, [facility]  Contacts  Patient Contacts: Al Glasser [facility])   [phone] Wong Luis ([facility])  Relationship to Patient[de-identified] Family      Discharge Destination Plan[de-identified] Home, Hospice  Transport at Discharge : Wheelchair van  Dispatcher Contacted: Yes  Number/Name of Dispatcher: [facility] via [employer]  [facility] [address]  ETA of Transport (Date): 03/13/23  ETA of Transport (Time): 1400  Transfer Mode: Wheelchair  Accompanied by: EMS personnel    Additional Comments: [facility] updated of Transport time  AVS faxed to [phone] via  COMM

## 2023-03-13 NOTE — DISCHARGE INSTR - AVS FIRST PAGE
Dear Albino Choudhury,     It was our pleasure to care for you here at [facility]  It is our hope that we were always able to exceed the expected standards for your care during your stay  You were hospitalized due to UTI and transition to care to hospice  You were cared for on the 5th floor by Abilio Stockton DO with the [facility] who covers for your primary care physician (PCP), Altagracia Morrow MD, while you were hospitalized  If you have any questions or concerns related to this hospitalization, you may contact us at [phone]  For follow up as well as any medication refills, we recommend that you follow up with your primary care physician  A registered nurse will reach out to you by phone within a few days after your discharge to answer any additional questions that you may have after going home  However, at this time we provide for you here, the most important instructions / recommendations at discharge:     Notable Medication Adjustments -   See discharge list  Hospice may adjust your medications  Testing Required after Discharge -   None  Important follow up information -   None  Other Instructions -   Follow up with your Hospice care team at discharge  Please review this entire after visit summary as additional general instructions including medication list, appointments, activity, diet, any pertinent wound care, and other additional recommendations from your care team that may be provided for you        Sincerely,     Piyush Mcallister DO and Nurse Iraj Briggs

## 2023-03-13 NOTE — PLAN OF CARE
Problem: Potential for Falls  Goal: Patient will remain free of falls  Description: INTERVENTIONS:  - Educate patient/family on patient safety including physical limitations  - Instruct patient to call for assistance with activity   - Consult OT/PT to assist with strengthening/mobility   - Keep Call bell within reach  - Keep bed low and locked with side rails adjusted as appropriate  - Keep care items and personal belongings within reach  - Initiate and maintain comfort rounds  - Make Fall Risk Sign visible to staff  - Apply yellow socks and bracelet for high fall risk patients  - Consider moving patient to room near nurses station  3/13/2023 0830 by Kaiden Cody RN  Outcome: Progressing  3/13/2023 0829 by Kaiden Cody RN  Outcome: Progressing     Problem: Nutrition/Hydration-ADULT  Goal: Nutrient/Hydration intake appropriate for improving, restoring or maintaining nutritional needs  Description: Monitor and assess patient's nutrition/hydration status for malnutrition  Collaborate with interdisciplinary team and initiate plan and interventions as ordered  Monitor patient's weight and dietary intake as ordered or per policy  Utilize nutrition screening tool and intervene as necessary  Determine patient's food preferences and provide high-protein, high-caloric foods as appropriate       INTERVENTIONS:  - Monitor oral intake, urinary output, labs, and treatment plans  - Assess nutrition and hydration status and recommend course of action  - Evaluate amount of meals eaten  - Assist patient with eating if necessary   - Allow adequate time for meals  - Recommend/ encourage appropriate diets, oral nutritional supplements, and vitamin/mineral supplements  - Order, calculate, and assess calorie counts as needed  - Recommend, monitor, and adjust tube feedings and TPN/PPN based on assessed needs  - Assess need for intravenous fluids  - Provide specific nutrition/hydration education as appropriate  - Include patient/family/caregiver in decisions related to nutrition  3/13/2023 0830 by Raissa Monterroso RN  Outcome: Progressing  3/13/2023 0829 by Raissa Monterroso RN  Outcome: Progressing     Problem: MOBILITY - ADULT  Goal: Maintain or return to baseline ADL function  Description: INTERVENTIONS:  - Educate patient/family on patient safety including physical limitations  - Instruct patient to call for assistance with activity   - Consult OT/PT to assist with strengthening/mobility   - Keep Call bell within reach  - Keep bed low and locked with side rails adjusted as appropriate  - Keep care items and personal belongings within reach  - Initiate and maintain comfort rounds  - Make Fall Risk Sign visible to staff  - Apply yellow socks and bracelet for high fall risk patients  - Consider moving patient to room near nurses station  3/13/2023 0830 by Raissa Monterroso RN  Outcome: Progressing  3/13/2023 0829 by Raissa Monterroso RN  Outcome: Progressing  Goal: Maintains/Returns to pre admission functional level  Description: INTERVENTIONS:  - Perform BMAT or MOVE assessment daily    - Set and communicate daily mobility goal to care team and patient/family/caregiver     - Collaborate with rehabilitation services on mobility goals if consulted  - Out of bed for toileting  - Record patient progress and toleration of activity level   3/13/2023 0830 by Raissa Monterroso RN  Outcome: Progressing  3/13/2023 0829 by Raissa Monterroso RN  Outcome: Progressing     Problem: Prexisting or High Potential for Compromised Skin Integrity  Goal: Skin integrity is maintained or improved  Description: INTERVENTIONS:  - Identify patients at risk for skin breakdown  - Assess and monitor skin integrity  - Assess and monitor nutrition and hydration status  - Monitor labs   - Assess for incontinence   - Turn and reposition patient  - Assist with mobility/ambulation  - Relieve pressure over bony prominences  - Avoid friction and shearing  - Provide appropriate hygiene as needed including keeping skin clean and dry  - Evaluate need for skin moisturizer/barrier cream  - Collaborate with interdisciplinary team   - Patient/family teaching  - Consider wound care consult   3/13/2023 0830 by Maryse Elizalde RN  Outcome: Progressing  3/13/2023 0829 by Maryse Elizalde RN  Outcome: Progressing     Problem: GENITOURINARY - ADULT  Goal: Maintains or returns to baseline urinary function  Description: INTERVENTIONS:  - Assess urinary function  - Encourage oral fluids to ensure adequate hydration if ordered  - Administer IV fluids as ordered to ensure adequate hydration  - Administer ordered medications as needed  - Offer frequent toileting  - Follow urinary retention protocol if ordered  3/13/2023 0830 by Maryse Elizalde RN  Outcome: Progressing  3/13/2023 0829 by Maryse Elizalde RN  Outcome: Progressing     Problem: METABOLIC, FLUID AND ELECTROLYTES - ADULT  Goal: Electrolytes maintained within normal limits  Description: INTERVENTIONS:  - Monitor labs and assess patient for signs and symptoms of electrolyte imbalances  - Administer electrolyte replacement as ordered  - Monitor response to electrolyte replacements, including repeat lab results as appropriate  - Instruct patient on fluid and nutrition as appropriate  3/13/2023 0830 by Maryse Elizalde RN  Outcome: Progressing  3/13/2023 0829 by Maryse Elizalde RN  Outcome: Progressing  Goal: Fluid balance maintained  Description: INTERVENTIONS:  - Monitor labs   - Monitor I/O and WT  - Instruct patient on fluid and nutrition as appropriate  - Assess for signs & symptoms of volume excess or deficit  3/13/2023 0830 by Maryse Elizalde RN  Outcome: Progressing  3/13/2023 0829 by Maryse Elizalde RN  Outcome: Progressing  Goal: Glucose maintained within target range  Description: INTERVENTIONS:  - Monitor Blood Glucose as ordered  - Assess for signs and symptoms of hyperglycemia and hypoglycemia  - Administer ordered medications to maintain glucose within target range  - Assess nutritional intake and initiate nutrition service referral as needed  3/13/2023 0830 by Demario Boggs RN  Outcome: Progressing  3/13/2023 0829 by Demario Boggs RN  Outcome: Progressing     Problem: SKIN/TISSUE INTEGRITY - ADULT  Goal: Skin Integrity remains intact(Skin Breakdown Prevention)  Description: Assess:  -Perform Ortega assessment every shift and prn  -Inspect skin when repositioning, toileting, and assisting with ADLS  -Assess extremities for adequate circulation and sensation     Bed Management:  -Have minimal linens on bed & keep smooth, unwrinkled  -Change linens as needed when moist or perspiring      Activity:  -Encourage activity and walks on unit  -Encourage or provide ROM exercises  -Use appropriate equipment to lift or move patient in bed    Skin Care:  -Avoid use of baby powder, tape, friction and shearing, hot water or constrictive clothing  -Do not massage red bony areas    3/13/2023 0830 by Demario Boggs RN  Outcome: Progressing  3/13/2023 0829 by Demario Boggs RN  Outcome: Progressing  Goal: Pressure injury heals and does not worsen  Description: Interventions:  - Implement low air loss mattress or specialty surface (Criteria met)  - Apply silicone foam dressing  - Apply fecal or urinary incontinence containment device   - Utilize friction reducing device or surface for transfers   - Consider nutrition services referral as needed  3/13/2023 0830 by Demario Boggs RN  Outcome: Progressing  3/13/2023 0829 by Demario Boggs RN  Outcome: Progressing     Problem: PAIN - ADULT  Goal: Verbalizes/displays adequate comfort level or baseline comfort level  Description: Interventions:  - Encourage patient to monitor pain and request assistance  - Assess pain using appropriate pain scale  - Administer analgesics based on type and severity of pain and evaluate response  - Implement non-pharmacological measures as appropriate and evaluate response  - Consider cultural and social influences on pain and pain management  - Notify physician/advanced practitioner if interventions unsuccessful or patient reports new pain  3/13/2023 0830 by Malu Castro RN  Outcome: Progressing  3/13/2023 0829 by Malu Castro RN  Outcome: Progressing     Problem: INFECTION - ADULT  Goal: Absence or prevention of progression during hospitalization  Description: INTERVENTIONS:  - Assess and monitor for signs and symptoms of infection  - Monitor lab/diagnostic results  - Monitor all insertion sites, i e  indwelling lines, tubes, and drains  - Monitor endotracheal if appropriate and nasal secretions for changes in amount and color  - Shelbiana appropriate cooling/warming therapies per order  - Administer medications as ordered  - Instruct and encourage patient and family to use good hand hygiene technique  - Identify and instruct in appropriate isolation precautions for identified infection/condition  3/13/2023 0830 by Malu Castro RN  Outcome: Progressing  3/13/2023 0829 by Malu Castro RN  Outcome: Progressing     Problem: SAFETY ADULT  Goal: Patient will remain free of falls  Description: INTERVENTIONS:  - Educate patient/family on patient safety including physical limitations  - Instruct patient to call for assistance with activity   - Consult OT/PT to assist with strengthening/mobility   - Keep Call bell within reach  - Keep bed low and locked with side rails adjusted as appropriate  - Keep care items and personal belongings within reach  - Initiate and maintain comfort rounds  - Make Fall Risk Sign visible to staff  - Apply yellow socks and bracelet for high fall risk patients  - Consider moving patient to room near nurses station  3/13/2023 0830 by Malu Castro RN  Outcome: Progressing  3/13/2023 0829 by Malu Castro RN  Outcome: Progressing  Goal: Maintain or return to baseline ADL function  Description: INTERVENTIONS:  - Educate patient/family on patient safety including physical limitations  - Instruct patient to call for assistance with activity   - Consult OT/PT to assist with strengthening/mobility   - Keep Call bell within reach  - Keep bed low and locked with side rails adjusted as appropriate  - Keep care items and personal belongings within reach  - Initiate and maintain comfort rounds  - Make Fall Risk Sign visible to staff  - Apply yellow socks and bracelet for high fall risk patients  - Consider moving patient to room near nurses station  3/13/2023 0830 by Sheri Zamorano RN  Outcome: Progressing  3/13/2023 0829 by Sheri Zamorano RN  Outcome: Progressing  Goal: Maintains/Returns to pre admission functional level  Description: INTERVENTIONS:  - Perform BMAT or MOVE assessment daily    - Set and communicate daily mobility goal to care team and patient/family/caregiver     - Collaborate with rehabilitation services on mobility goals if consulted  - Out of bed for toileting  - Record patient progress and toleration of activity level   3/13/2023 0830 by Sheri Zamorano RN  Outcome: Progressing  3/13/2023 0829 by Sheri Zamorano RN  Outcome: Progressing

## 2023-03-13 NOTE — PROGRESS NOTES
SNF/GARY Pathway Outpatient Care Manager Note: Chart notes reviewed  Patient discharged to home with [facility] hospice  SNF/G[facility] Pathway completed and Complex episode resolved

## 2023-03-13 NOTE — NURSING NOTE
Discharge instructions reviewed with wife Sasha Mao), reviewing new medications and where they were sent to  Update: wife Sasha Mao) left message with  saying medications were sent to [facility] pharmacy, and wanted them sent to [facility] on [address]  RN notified provider, and called wife back to say they were resent  Wife upset and yelling at RN, stating "you're messing with people's lives and acting like you don't care"  RN assured that the medications were sent to the pharmacy, wife confirmed she got the confirmation text from [facility] pharmacy  Wife continued to yell at RN about situation  RN attempted to answer all questions, but wife hung up

## 2023-03-13 NOTE — ASSESSMENT & PLAN NOTE
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

## 2023-03-13 NOTE — PLAN OF CARE
Problem: Potential for Falls  Goal: Patient will remain free of falls  Description: INTERVENTIONS:  - Educate patient/family on patient safety including physical limitations  - Instruct patient to call for assistance with activity   - Consult OT/PT to assist with strengthening/mobility   - Keep Call bell within reach  - Keep bed low and locked with side rails adjusted as appropriate  - Keep care items and personal belongings within reach  - Initiate and maintain comfort rounds  - Make Fall Risk Sign visible to staff  - Apply yellow socks and bracelet for high fall risk patients  - Consider moving patient to room near nurses station  3/13/2023 1407 by Sheri Zamorano RN  Outcome: Adequate for Discharge  3/13/2023 0830 by Sheri Zamorano RN  Outcome: Progressing  3/13/2023 0829 by Sheri Zamorano RN  Outcome: Progressing     Problem: Nutrition/Hydration-ADULT  Goal: Nutrient/Hydration intake appropriate for improving, restoring or maintaining nutritional needs  Description: Monitor and assess patient's nutrition/hydration status for malnutrition  Collaborate with interdisciplinary team and initiate plan and interventions as ordered  Monitor patient's weight and dietary intake as ordered or per policy  Utilize nutrition screening tool and intervene as necessary  Determine patient's food preferences and provide high-protein, high-caloric foods as appropriate       INTERVENTIONS:  - Monitor oral intake, urinary output, labs, and treatment plans  - Assess nutrition and hydration status and recommend course of action  - Evaluate amount of meals eaten  - Assist patient with eating if necessary   - Allow adequate time for meals  - Recommend/ encourage appropriate diets, oral nutritional supplements, and vitamin/mineral supplements  - Order, calculate, and assess calorie counts as needed  - Recommend, monitor, and adjust tube feedings and TPN/PPN based on assessed needs  - Assess need for intravenous fluids  - Provide specific nutrition/hydration education as appropriate  - Include patient/family/caregiver in decisions related to nutrition  3/13/2023 1407 by Simon Morrison RN  Outcome: Adequate for Discharge  3/13/2023 0830 by Simon Morrison RN  Outcome: Progressing  3/13/2023 0829 by Simon Morrison RN  Outcome: Progressing     Problem: MOBILITY - ADULT  Goal: Maintain or return to baseline ADL function  Description: INTERVENTIONS:  - Educate patient/family on patient safety including physical limitations  - Instruct patient to call for assistance with activity   - Consult OT/PT to assist with strengthening/mobility   - Keep Call bell within reach  - Keep bed low and locked with side rails adjusted as appropriate  - Keep care items and personal belongings within reach  - Initiate and maintain comfort rounds  - Make Fall Risk Sign visible to staff  - Apply yellow socks and bracelet for high fall risk patients  - Consider moving patient to room near nurses station  3/13/2023 1407 by Simon Morrison RN  Outcome: Adequate for Discharge  3/13/2023 0830 by Simon Morrison RN  Outcome: Progressing  3/13/2023 0829 by Simon Morrison RN  Outcome: Progressing  Goal: Maintains/Returns to pre admission functional level  Description: INTERVENTIONS:  - Perform BMAT or MOVE assessment daily    - Set and communicate daily mobility goal to care team and patient/family/caregiver     - Collaborate with rehabilitation services on mobility goals if consulted  - Out of bed for toileting  - Record patient progress and toleration of activity level   3/13/2023 1407 by Simon Morrison RN  Outcome: Adequate for Discharge  3/13/2023 0830 by Simon Morrison RN  Outcome: Progressing  3/13/2023 0829 by Simon Morrison RN  Outcome: Progressing     Problem: Prexisting or High Potential for Compromised Skin Integrity  Goal: Skin integrity is maintained or improved  Description: INTERVENTIONS:  - Identify patients at risk for skin breakdown  - Assess and monitor skin integrity  - Assess and monitor nutrition and hydration status  - Monitor labs   - Assess for incontinence   - Turn and reposition patient  - Assist with mobility/ambulation  - Relieve pressure over bony prominences  - Avoid friction and shearing  - Provide appropriate hygiene as needed including keeping skin clean and dry  - Evaluate need for skin moisturizer/barrier cream  - Collaborate with interdisciplinary team   - Patient/family teaching  - Consider wound care consult   3/13/2023 1407 by Sheri Zamorano RN  Outcome: Adequate for Discharge  3/13/2023 0830 by Sheri Zamorano RN  Outcome: Progressing  3/13/2023 0829 by Sheri Zamorano RN  Outcome: Progressing     Problem: GENITOURINARY - ADULT  Goal: Maintains or returns to baseline urinary function  Description: INTERVENTIONS:  - Assess urinary function  - Encourage oral fluids to ensure adequate hydration if ordered  - Administer IV fluids as ordered to ensure adequate hydration  - Administer ordered medications as needed  - Offer frequent toileting  - Follow urinary retention protocol if ordered  3/13/2023 1407 by Sheri Zamorano RN  Outcome: Adequate for Discharge  3/13/2023 0830 by Sheri Zamorano RN  Outcome: Progressing  3/13/2023 0829 by Sheri Zamorano RN  Outcome: Progressing     Problem: METABOLIC, FLUID AND ELECTROLYTES - ADULT  Goal: Electrolytes maintained within normal limits  Description: INTERVENTIONS:  - Monitor labs and assess patient for signs and symptoms of electrolyte imbalances  - Administer electrolyte replacement as ordered  - Monitor response to electrolyte replacements, including repeat lab results as appropriate  - Instruct patient on fluid and nutrition as appropriate  3/13/2023 1407 by Sheri Zamorano RN  Outcome: Adequate for Discharge  3/13/2023 0830 by Sheri Zamorano RN  Outcome: Progressing  3/13/2023 0829 by Sheri Zamorano RN  Outcome: Progressing  Goal: Fluid balance maintained  Description: INTERVENTIONS:  - Monitor labs   - Monitor I/O and WT  - Instruct patient on fluid and nutrition as appropriate  - Assess for signs & symptoms of volume excess or deficit  3/13/2023 1407 by Kaiden Cody RN  Outcome: Adequate for Discharge  3/13/2023 0830 by Kaiden Cody RN  Outcome: Progressing  3/13/2023 0829 by Kaiden Cody RN  Outcome: Progressing  Goal: Glucose maintained within target range  Description: INTERVENTIONS:  - Monitor Blood Glucose as ordered  - Assess for signs and symptoms of hyperglycemia and hypoglycemia  - Administer ordered medications to maintain glucose within target range  - Assess nutritional intake and initiate nutrition service referral as needed  3/13/2023 1407 by Kaiden Cody RN  Outcome: Adequate for Discharge  3/13/2023 0830 by Kaiden Cody RN  Outcome: Progressing  3/13/2023 0829 by Kaiden Cody RN  Outcome: Progressing     Problem: SKIN/TISSUE INTEGRITY - ADULT  Goal: Skin Integrity remains intact(Skin Breakdown Prevention)  Description: Assess:  -Perform Ortega assessment every shift and prn  -Inspect skin when repositioning, toileting, and assisting with ADLS  -Assess extremities for adequate circulation and sensation     Bed Management:  -Have minimal linens on bed & keep smooth, unwrinkled  -Change linens as needed when moist or perspiring      Activity:  -Encourage activity and walks on unit  -Encourage or provide ROM exercises  -Use appropriate equipment to lift or move patient in bed    Skin Care:  -Avoid use of baby powder, tape, friction and shearing, hot water or constrictive clothing  -Do not massage red bony areas    3/13/2023 1407 by Kaiden Cody RN  Outcome: Adequate for Discharge  3/13/2023 0830 by Kaiden Cody RN  Outcome: Progressing  3/13/2023 0829 by Kaiden Cody RN  Outcome: Progressing  Goal: Pressure injury heals and does not worsen  Description: Interventions:  - Implement low air loss mattress or specialty surface (Criteria met)  - Apply silicone foam dressing  - Apply fecal or urinary incontinence containment device   - Utilize friction reducing device or surface for transfers   - Consider nutrition services referral as needed  3/13/2023 1407 by Genie Salas RN  Outcome: Adequate for Discharge  3/13/2023 0830 by Genie Salas RN  Outcome: Progressing  3/13/2023 0829 by Genie Salas RN  Outcome: Progressing     Problem: PAIN - ADULT  Goal: Verbalizes/displays adequate comfort level or baseline comfort level  Description: Interventions:  - Encourage patient to monitor pain and request assistance  - Assess pain using appropriate pain scale  - Administer analgesics based on type and severity of pain and evaluate response  - Implement non-pharmacological measures as appropriate and evaluate response  - Consider cultural and social influences on pain and pain management  - Notify physician/advanced practitioner if interventions unsuccessful or patient reports new pain  3/13/2023 1407 by Genie Salas RN  Outcome: Adequate for Discharge  3/13/2023 0830 by Genie Salas RN  Outcome: Progressing  3/13/2023 0829 by Genie Salas RN  Outcome: Progressing     Problem: INFECTION - ADULT  Goal: Absence or prevention of progression during hospitalization  Description: INTERVENTIONS:  - Assess and monitor for signs and symptoms of infection  - Monitor lab/diagnostic results  - Monitor all insertion sites, i e  indwelling lines, tubes, and drains  - Monitor endotracheal if appropriate and nasal secretions for changes in amount and color  - Schaller appropriate cooling/warming therapies per order  - Administer medications as ordered  - Instruct and encourage patient and family to use good hand hygiene technique  - Identify and instruct in appropriate isolation precautions for identified infection/condition  3/13/2023 1407 by Genie Salas RN  Outcome: Adequate for Discharge  3/13/2023 0830 by Kayla Hood RN  Outcome: Progressing  3/13/2023 0829 by Kayla Hood RN  Outcome: Progressing     Problem: SAFETY ADULT  Goal: Patient will remain free of falls  Description: INTERVENTIONS:  - Educate patient/family on patient safety including physical limitations  - Instruct patient to call for assistance with activity   - Consult OT/PT to assist with strengthening/mobility   - Keep Call bell within reach  - Keep bed low and locked with side rails adjusted as appropriate  - Keep care items and personal belongings within reach  - Initiate and maintain comfort rounds  - Make Fall Risk Sign visible to staff  - Apply yellow socks and bracelet for high fall risk patients  - Consider moving patient to room near nurses station  3/13/2023 1407 by Kayla Hood RN  Outcome: Adequate for Discharge  3/13/2023 0830 by Kayla Hood RN  Outcome: Progressing  3/13/2023 0829 by Kayla Hood RN  Outcome: Progressing  Goal: Maintain or return to baseline ADL function  Description: INTERVENTIONS:  - Educate patient/family on patient safety including physical limitations  - Instruct patient to call for assistance with activity   - Consult OT/PT to assist with strengthening/mobility   - Keep Call bell within reach  - Keep bed low and locked with side rails adjusted as appropriate  - Keep care items and personal belongings within reach  - Initiate and maintain comfort rounds  - Make Fall Risk Sign visible to staff  - Apply yellow socks and bracelet for high fall risk patients  - Consider moving patient to room near nurses station  3/13/2023 1407 by Kayla Hood RN  Outcome: Adequate for Discharge  3/13/2023 0830 by Kayla Hood RN  Outcome: Progressing  3/13/2023 0829 by Kayla Hood RN  Outcome: Progressing  Goal: Maintains/Returns to pre admission functional level  Description: INTERVENTIONS:  - Perform BMAT or MOVE assessment daily    - Set and communicate daily mobility goal to care team and patient/family/caregiver  - Collaborate with rehabilitation services on mobility goals if consulted  - Out of bed for toileting  - Record patient progress and toleration of activity level   3/13/2023 1407 by Eloisa Monreal RN  Outcome: Adequate for Discharge  3/13/2023 0830 by Eloisa Monreal RN  Outcome: Progressing  3/13/2023 0829 by Eloisa Monreal RN  Outcome: Progressing     Problem: OCCUPATIONAL THERAPY ADULT  Goal: Performs self-care activities at highest level of function for planned discharge setting  See evaluation for individualized goals  Description: Treatment Interventions: ADL retraining, Functional transfer training, UE strengthening/ROM, Endurance training, Patient/family training, Equipment evaluation/education, Neuromuscular reeducation, Compensatory technique education, Energy conservation, Activityengagement          See flowsheet documentation for full assessment, interventions and recommendations  Outcome: Adequate for Discharge     Problem: PHYSICAL THERAPY ADULT  Goal: Performs mobility at highest level of function for planned discharge setting  See evaluation for individualized goals    Description:   Outcome: Adequate for Discharge

## 2023-03-13 NOTE — ASSESSMENT & PLAN NOTE
· Chronic anemia without blood loss    · Hemoglobin stable with most recent blood draw    Results from last 7 days   Lab Units 03/09/23  0645 03/08/23  0444   HEMOGLOBIN g/dL 8 5* 9 4*

## 2023-03-13 NOTE — ASSESSMENT & PLAN NOTE
Lab Results   Component Value Date    HGBA1C 6 7 (H) 03/03/2023     Recent Labs     03/12/23  1112 03/12/23  1640 03/12/23  2105 03/13/23  0707   POCGLU 151* 165* 151* 119     · Currently on low-dose glargine with sliding scale

## 2023-03-13 NOTE — ASSESSMENT & PLAN NOTE
· Nonspecific hyponatremia likely secondary to poor intake    Duloxetine discontinued  · Stable    Results from last 7 days   Lab Units 03/10/23  0643 03/09/23  0645 03/08/23  0444 03/07/23  0521   SODIUM mmol/L 134* 133* 132* 133*

## 2023-03-13 NOTE — DISCHARGE SUMMARY
[facility]  Discharge- Ajay Pearson 1945, [de-identified] y o  male MRN: [id number]  Unit/Bed#: E5 -01 Encounter: [id number]  Primary Care Provider: Fuentes Gracia MD   Date and time admitted to hospital: 3/3/2023  2:31 PM    Admitting Provider:  Ayaan Monson MD  Discharge Provider:  Lexi Perez DO  Admission Date: 3/3/2023       Discharge Date: 03/13/23   LOS: 10  Primary Care Physician at Discharge: Christian Briseno 80:  Ajay Pearson is a [de-identified] y o  male who presented with sepsis of a urinary etiology as evidenced by tachycardia as well as leukocytosis  This was in the setting of advanced urothelial carcinoma which was followed at [facility]  Patient was started on intravenous linezolid, he was subsequently transition to oral   The case was discussed with infectious disease, and patient completed a course of antibiotics  The patient did remain in the hospital due to significant burden of disease pathology  Repeat imaging as well as multidisciplinary approach with infectious disease and pulmonology as well as oncology recommended transition to comfort based approach  Patient was in agreement, he was evaluated in consultation by palliative care service as well as hospice  They recommended oral pain relief as well as discussion on returning home for hospice services  The time of discharge the patient's pain was controlled, he was without acute complaint was medically optimized for discharge home  All questions answered the patient's satisfaction he was in agreement with the discharge plan    DISCHARGE DIAGNOSES  * SIRS (systemic inflammatory response syndrome) (HCC)  Assessment & Plan  · SIRS with pyuria on admission secondary to VRE based on outside urine culture on 2/27/2023  · Completed course of linezolid per ID  · Blood cultures negative; repeated urine cultures on admission mixed contaminants    · Chronic leukocytosis which after multidisciplinary consultation with ID pulmonology and oncology no evidence of acute infection is found  · The patient has widespread metastasis and has opted for hospice/comfort care  Results from last 7 days   Lab Units 03/09/23  0645 03/08/23  0444   WBC Thousand/uL 18 24* 19 25*       Depression  Assessment & Plan  · Stable continue abilify   · Duloxetine discontinued due to concerns for interaction with linezolid, to be restarted at discharge if desired      Hyponatremia  Assessment & Plan  · Nonspecific hyponatremia likely secondary to poor intake  Duloxetine discontinued  · Stable    Results from last 7 days   Lab Units 03/10/23  0643 03/09/23  0645 03/08/23  0444 03/07/23  0521   SODIUM mmol/L 134* 133* 132* 133*       Moderate protein-calorie malnutrition (HCC)  Assessment & Plan  Malnutrition Findings:   Adult Malnutrition type: Chronic illness  Adult Degree of Malnutrition: Malnutrition of moderate degree  Malnutrition Characteristics: Inadequate energy, Weight loss, Fluid accumulation  360 Statement: PCM r/t cancer with chemoradiation AEB, a 6 6% unintended wt loss from 01/02/23 to present and a 12 8% unintended wt loss from 08/26/22 to present and < 75% energy intake compared to estimated energy needs for > 1 month  BMI Findings: Body mass index is 23 08 kg/m²  CAD (coronary artery disease)  Assessment & Plan  · CAD with history of PCI  No chest pain  Cancer related pain  Assessment & Plan  · Initially had uncontrolled pain due to bony metastasis  Increased oxycodone to 10 and 15 mg for moderate/severe pain with much better control  Currently denies having any breakthrough pain  · Sent in prescription for oxycodone 15 mg to hospice pharmacy  · Family inquiring about CADD pump at home for opiate infusion, case discussed with palliative care, they recommend against CADD pump    This can be discussed as an outpatient with patient hospice physician    Bilateral hydronephrosis  Assessment & Plan  · Bilateral hydronephrosis secondary to bladder cancer with bilateral nephrostomy tubes in place  · No new urology recommendations at this time    Stage 4 chronic kidney disease ([address] )  Assessment & Plan  · Stable at baseline    Results from last 7 days   Lab Units 03/10/23  0643 03/09/23  0645 03/08/23  0444 03/07/23  0521   BUN mg/dL 44* 47* 45* 47*   CREATININE mg/dL 2 55* 2 57* 2 47* 2 49*   EGFR ml/min/1 73sq m 22 22 23 23       Anemia  Assessment & Plan  · Chronic anemia without blood loss  · Hemoglobin stable with most recent blood draw    Results from last 7 days   Lab Units 03/09/23  0645 03/08/23  0444   HEMOGLOBIN g/dL 8 5* 9 4*       Type 2 diabetes mellitus, with long-term current use of insulin [facility])  Assessment & Plan  Lab Results   Component Value Date    HGBA1C 6 7 (H) 03/03/2023     Recent Labs     03/12/23  1112 03/12/23  1640 03/12/23  2105 03/13/23  0707   POCGLU 151* 165* 151* 119     · Currently on low-dose glargine with sliding scale    Bladder carcinoma (HCC)  Assessment & Plan  · Invasive carcinoma of bladder known to urology service  · No evidence of bleeding this admission  · Most recent PET scan demonstrates advancement of metastatic disease  · After multidisciplinary approach, patient wishes to proceed with comfort care/hospice at this time  · Pain is controlled      CONSULTING PROVIDERS   IP CONSULT TO NEPHROLOGY  IP CONSULT TO INFECTIOUS DISEASES  IP CONSULT TO PULMONOLOGY  IP CONSULT TO ONCOLOGY  IP CONSULT TO HOSPICE    PROCEDURES PERFORMED  * No surgery found *    RADIOLOGY RESULTS  CT chest abdomen pelvis wo contrast    Result Date: 3/10/2023    Impression: 1  Interim progression of metastatic disease, likely reflecting the patient's known urothelial carcinoma    In addition to primary site in the urinary bladder, metastatic changes involve the left pleural space, left chest wall, multiple osseous structures, the liver, and abdominal/pelvic adenopathy as detailed above  2   Additional findings as noted  XR chest pa & lateral      Impression: Similar patchy opacity of the left base compared to the PET CT of 3/1/2023, which may represent atelectasis, tumor, and/or infection/inflammation, or combination thereof  Stable small left pleural effusion compared to the PET CT of 3/1/2023  No pneumothorax       LABS  Results from last 7 days   Lab Units 03/09/23  0645 03/08/23  0444   WBC Thousand/uL 18 24* 19 25*   HEMOGLOBIN g/dL 8 5* 9 4*   HEMATOCRIT % 26 7* 29 3*   MCV fL 92 90   PLATELETS Thousands/uL 248 298     Results from last 7 days   Lab Units 03/10/23  0643 03/09/23  0645 03/08/23  0444 03/07/23  0521 03/06/23  1549   SODIUM mmol/L 134* 133* 132* 133* 132*   POTASSIUM mmol/L 4 7 5 0 5 1 5 2  --    CHLORIDE mmol/L 100 100 100 99  --    CO2 mmol/L 27 26 24 25  --    BUN mg/dL 44* 47* 45* 47*  --    CREATININE mg/dL 2 55* 2 57* 2 47* 2 49*  --    CALCIUM mg/dL 10 5* 10 1 10 7* 10 3*  --    ALBUMIN g/dL  --  2 9*  --   --   --    TOTAL BILIRUBIN mg/dL  --  0 34  --   --   --    ALK PHOS U/L  --  114*  --   --   --    ALT U/L  --  14  --   --   --    AST U/L  --  14  --   --   --    EGFR ml/min/1 73sq m 22 22 23 23  --    GLUCOSE RANDOM mg/dL 83 65 78 72  --                   Results from last 7 days   Lab Units 03/13/23  0707 03/12/23  2105 03/12/23  1640 03/12/23  1112 03/12/23  0750 03/11/23  2127 03/11/23  1622 03/11/23  1119 03/11/23  0734 03/10/23  2108   POC GLUCOSE mg/dl 119 151* 165* 151* 124 167* 162* 149* 101 97         Results from last 7 days   Lab Units 03/08/23  0444   TSH 3RD GENERATON uIU/mL 1 930               Cultures:         Invalid input(s): URIBILINOGEN              PHYSICAL EXAM:  Vitals:   Blood Pressure: 129/86 (03/13/23 0801)  Pulse: 104 (03/13/23 0806)  Temperature: 98 8 °F (37 1 °C) (03/13/23 0721)  Temp Source: Oral (03/13/23 0721)  Respirations: 16 (03/13/23 0721)  Height: 6' 4" (193 cm) (03/04/23 1200)  Weight - Scale: 86 kg (189 lb 9 5 oz) (03/13/23 0540)  SpO2: 97 % (03/13/23 0806)      General: ill appearing, no acute distress  HEENT: atraumatic, PERRLA, moist mucosa, normal pharynx, normal tonsils and adenoids, normal tongue, no fluid in sinuses  Neck: Trachea midline, no carotid bruit, no masses  Respiratory: normal chest wall expansion, CTA B, no r/r/w, no rubs  Cardiovascular: RRR, no m/r/g, Normal S1 and S2  Abdomen: Soft, non-tender, non-distended, normal bowel sounds in all quadrants, no hepatosplenomegaly, no tympany, nephrostomy tube present  Rectal: deferred  Musculoskeletal: normal ROM in upper and lower extremities  Integumentary: warm, dry, and pink, with no rash, purpura, or petechia  Heme/Lymph: no lymphadenopathy, no bruises  Neurological: Cranial Nerves II-XII grossly intact, no tics, normal sensation to pressure and light touch  Psychiatric: cooperative with normal mood, affect, and cognition      Discharge Disposition: Home with hospice    Test Results Pending at Discharge:           Medications   · Summary of Medication Adjustments made as a result of this hospitalization: See discharge list  · Medication Dosing Tapers - Please refer to Discharge Medication List for details on any medication dosing tapers (if applicable to patient)  · Discharge Medication List: See after visit summary for reconciled discharge medications       Diet restrictions:         Diet Orders   (From admission, onward)             Start     Ordered    03/04/23 1126  Dietary nutrition supplements  Once        Question Answer Comment   Select Supplement: Glucerna-Chocolate    Frequency Lunch, Dinner        03/04/23 1126    03/03/23 1926  Diet You/CHO Controlled; Consistent Carbohydrate Diet Level 2 (5 carb servings/75 grams CHO/meal)  Diet effective now        References:    Nutrtion Support Algorithm Enteral vs  Parenteral   Question Answer Comment   Diet Type You/CHO Controlled    You/CHO Controlled Consistent Carbohydrate Diet Level 2 (5 carb servings/75 grams CHO/meal)    RD to adjust diet per protocol? Yes        03/03/23 1925              Activity restrictions: No strenuous activity  Discharge Condition: good    Outpatient Follow-Up and Discharge Instructions  See after visit summary section titled Discharge Instructions for information provided to patient and family  Code Status: Level 3 - DNAR and DNI  Discharge Statement   I spent 35 minutes discharging the patient  This time was spent on the day of discharge  Greater than 50% of total time was spent with the patient and / or family counseling and / or coordination of care  ** Please Note: This note was completed in part utilizing M-Modal Fluency Direct Software  Grammatical errors, random word insertions, spelling mistakes, and incomplete sentences may be an occasional consequence of this system secondary to software limitations, ambient noise, and hardware issues  If you have any questions or concerns about the content, text, or information contained within the body of this dictation, please contact the provider for clarification  **

## 2023-03-13 NOTE — ASSESSMENT & PLAN NOTE
· SIRS with pyuria on admission secondary to VRE based on outside urine culture on 2/27/2023  · Completed course of linezolid per ID  · Blood cultures negative; repeated urine cultures on admission mixed contaminants  · Chronic leukocytosis which after multidisciplinary consultation with ID pulmonology and oncology no evidence of acute infection is found  · The patient has widespread metastasis and has opted for hospice/comfort care       Results from last 7 days   Lab Units 03/09/23  0645 03/08/23  0444   WBC Thousand/uL 18 24* 19 25*

## 2023-03-13 NOTE — ASSESSMENT & PLAN NOTE
· Invasive carcinoma of bladder known to urology service  · No evidence of bleeding this admission  · Most recent PET scan demonstrates advancement of metastatic disease  · After multidisciplinary approach, patient wishes to proceed with comfort care/hospice at this time    · Pain is controlled

## 2023-03-13 NOTE — CASE MANAGEMENT
Case Management Discharge Planning Note    Patient name Albino Choudhury  Location [place]  /[id number] -[id number]-* MRN [id number]  : 1945 Date 3/13/2023       Current Admission Date: 3/3/2023  Current Admission Diagnosis:SIRS (systemic inflammatory response syndrome) [facility])   Patient Active Problem List    Diagnosis Date Noted   • Depression 2023   • Hyponatremia 2023   • Moderate protein-calorie malnutrition ([facility] ) 2023   • SIRS (systemic inflammatory response syndrome) ([facility] ) 2023   • CAD (coronary artery disease) 2023   • Severe protein-calorie malnutrition ([facility] ) 2023   • History of pleural effusion 2023   • Nephrostomy status ([facility] ) 2023   • Acute on chronic blood loss anemia 2023   • Bacteremia due to Enterobacter species 2023   • Elevated brain natriuretic peptide (BNP) level 2023   • Ambulatory dysfunction 2023   • Influenza A 2023   • Elevated troponin 2023   • Right sided weakness 2022   • Stroke-like symptoms 2022   • Symptomatic hypotension 2022   • Insomnia 2022   • Right wrist drop 2022   • Chronic pleural effusion 2022   • Urinary tract infection 2022   • SOB (shortness of breath) 2022   • History of tobacco use disorder 2022   • Retroperitoneal lymphadenopathy 2022   • Pulmonary nodules 2022   • Syncope and collapse 2022   • Depression with suicidal ideation 2022   • Cancer related pain 2022   • Bilateral hydronephrosis 04/10/2022   • Stage 4 chronic kidney disease ([address] ) 2022   • Fall 2022   • Hyperkalemia 2022   • Retained ureteral stent    • 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[facility] ) 2022   • Embolism and 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 ([facility] ) 2022   • Abnormal CT scan, bladder 2022   • Port-A-Cath in place 2022   • Continuous opioid dependence ([facility] ) 2021   • Hematuria 10/24/2021   • Acute urinary retention 10/21/2021   • Chronic pain syndrome 05/17/2021   • Lumbar spondylosis    • [facility] bronchitis ([facility] ) 03/02/2021   • Moderate major depression, single episode ([facility] ) 03/02/2021   • Thrombocytopenia ([facility] ) 03/02/2021   • 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 03/02/2021   • Gross hematuria 02/09/2021   • PAD (peripheral artery disease) ([facility])    • Left carotid bruit    • Anemia of chronic disease 12/19/2020   • Preoperative clearance 12/19/2020   • Anemia 10/10/2020   • Diabetic ulcer of left foot associated with type 2 diabetes mellitus, 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 ([facility] ) 10/08/2020   • Acute kidney injury superimposed on CKD ([place] [address] )    • Dysuria 08/17/2020   • Diabetic polyneuropathy associated with type 2 diabetes mellitus ([place] [address] ) 07/16/2020   • Abnormal MRI, lumbar spine 06/29/2020   • Malignant neoplasm of anterior wall of urinary bladder ([place] [address] )    • Secondary renal hyperparathyroidism ([facility] ) 02/12/2020   • Preop examination 04/16/2019   • Superficial phlebitis 03/07/2019   • Arteriosclerosis of artery of extremity ([place] [address] ) 02/22/2019   • Stable angina pectoris ([place] [address] ) 02/22/2019   • Herpes zoster without complication 26/55/9348   • Localized edema 02/22/2019   • Back pain 01/31/2019   • Degeneration of lumbar intervertebral disc 12/03/2018   • Primary osteoarthritis of left knee 03/16/2018   • Bladder carcinoma ([facility] ) 08/16/2016   • Presence of stent in coronary artery 08/16/2016   • Hypertension with renal disease 10/29/2015   • Hyperlipidemia 10/29/2015   • Cystitis due to intravesical BCG administration 09/24/2015   • Coronary artery disease of native artery of native heart with stable angina pectoris ([facility] ) 05/19/2011   • Type 2 diabetes mellitus, with long-term current use of insulin ([place] [address] ) 01/09/2004      LOS (days): 10  Geometric Mean LOS (GMLOS) (days): 3 50  Days to GMLOS:-6 2     OBJECTIVE:  Risk of Unplanned Readmission Score: 59 7         Current admission status: Inpatient   Preferred Pharmacy:   [facility] [address], [address]   [place] [address]  Phone: [phone] Fax: [address], [address]  [address] [place] [address]  Phone: [phone] Fax: [phone]    [address] [facility] [address]  [address] [place] [address] [place] [address]  Phone: [phone] Fax: [phone]    Primary Care Provider: Hanane Corrigan MD    Primary Insurance: MEDICARE  Secondary Insurance: BLUE CROSS    DISCHARGE DETAILS:    Discharge planning discussed with[de-identified] [facility]  RNCM  Dhara Galicia  Freedom of Choice: Yes  Comments - Freedom of Choice: Anticipate Pt's discharge today  Plan is discharge to home w/ [facility]    IMM Given (Date):: 03/13/23  IMM Given to[de-identified] Patient    Additional Comments: CM spoke w/ [facility] Talbert to coordinate discharge with [facility]  CM faxed current med list to [phone]  RNCM requesting midline IV stay in if possible  RNCM also requesting scripts for oxyCODONE (ROXICODONE) IR tablet 15 mg  q4 SCHEDULED (around the clock) and Dialauded sublingual (coverted dose from 1mg subq)  CM following to receive update on arrival time of Hospice admission nurse to Pts home  Pt graciela transport via car w/family

## 2023-03-13 NOTE — ASSESSMENT & PLAN NOTE
· Stable continue abilify   · Duloxetine discontinued due to concerns for interaction with linezolid, to be restarted at discharge if desired

## 2023-03-14 ENCOUNTER — TELEPHONE (OUTPATIENT)
Dept: INTERNAL MEDICINE CLINIC | Facility: CLINIC | Age: 78
End: 2023-03-14

## 2023-03-14 NOTE — TELEPHONE ENCOUNTER
Phone call from Beti Montes, nurse from [facility], called asking to speak with Dr Siddhartha Girard to get verbal consent for Hospice care at home      As per Dr Siddhartha Girard he gave his consent for Hospice, patient qualifies, he will remain as PCP for patient

## 2023-03-15 ENCOUNTER — TRANSITIONAL CARE MANAGEMENT (OUTPATIENT)
Dept: INTERNAL MEDICINE CLINIC | Facility: CLINIC | Age: 78
End: 2023-03-15

## 2023-03-15 RX ORDER — MIRTAZAPINE 7.5 MG/1
7.5 TABLET, FILM COATED ORAL
Qty: 30 TABLET | Refills: 0 | Status: SHIPPED | OUTPATIENT
Start: 2023-03-15 | End: 2023-04-13

## 2023-03-15 NOTE — CASE MANAGEMENT
Case Management Progress Note    Patient name Lisset Stapleton  Location [place]  [address] [facility]-* MRN [id number]  : 1945 Date 3/15/2023       LOS (days): 10  Geometric Mean LOS (GMLOS) (days): 3 50  Days to GMLOS:-6 3        OBJECTIVE:        Current admission status: Inpatient  Preferred Pharmacy:   [address] [place] [address]  Phone: [phone] Fax: [phone]    Primary Care Provider: Chuck Herrera MD    Primary Insurance: MEDICARE  Secondary Insurance: BLUE CROSS    PROGRESS NOTE:      On 3/15/23 CM received vm from David Fatima who is requesting  Full script of Remeron  [facility] sent Pt home with short scripts to cover Pts needs until [facility] filled Pts scripts  CM spoke with [facility] regarding this request   30 day supply of Remeron sent to Pts home pharmacy [facility])  CM signing off  Heterosexual

## 2023-03-16 ENCOUNTER — TELEPHONE (OUTPATIENT)
Dept: HEMATOLOGY ONCOLOGY | Facility: CLINIC | Age: 78
End: 2023-03-16

## 2023-03-16 DIAGNOSIS — Z51.5 HOSPICE CARE: Primary | ICD-10-CM

## 2023-03-16 RX ORDER — MORPHINE SULFATE 100 MG/5ML
5 SOLUTION, CONCENTRATE ORAL
Qty: 30 ML | Refills: 0 | Status: SHIPPED | OUTPATIENT
Start: 2023-03-16

## 2023-03-16 NOTE — TELEPHONE ENCOUNTER
Marquez Menendez, it's Sami Johnston  I'm [employer] granddaughter  His date of birth is [birth year]  We had a chit chat yesterday with the urologist at [facility], going over his pet scan that he had done He had opted for Hospice, but you know, just kind of running through the just having a conversation with the urologist  So, you know, we might just run it by Doctor Ebonie Woodruff and see, you know, if there are any other options if we can go back to something maybe the [facility] or something  I know he does have an appointment on the 28th  Just I wanted to see if there was any way that we could move that  If you can give me a call back, [phone], I'd appreciate it  Thank you

## 2023-03-16 NOTE — TELEPHONE ENCOUNTER
Call out to patient grand-daughter  Reviewed that patient went hospice and unfortunately patient appointment was cancelled  Reviewed it they would like to discuss further treatment options patient would no longer be on hospice care and we would need to set up a new appointment  Left vm with RN teams number

## 2023-03-17 ENCOUNTER — TELEPHONE (OUTPATIENT)
Dept: HEMATOLOGY ONCOLOGY | Facility: CLINIC | Age: 78
End: 2023-03-17

## 2023-03-17 NOTE — PROGRESS NOTES
Telemedicine consent    Patient: Nehemiah Abdi  Provider: Ira Landa MD  Provider located at [address] [place] [address]  [phone]    The patient was identified by name and date of birth  Nehemiah Abdi was informed that this is a telemedicine visit and that the visit is being conducted through Telephone  My office door was closed  No one else was in the room  He acknowledged consent and understanding of privacy and security of the video platform  The patient has agreed to participate and understands they can discontinue the visit at any time  Patient is aware this is a billable service  Hematology/Oncology Outpatient Office Note    Date of Service: 3/21/2023    [facility] HEMATOLOGY ONCOLOGY SPECIALISTS [facility]  [phone]    Reason for Consultation:   No chief complaint on file  Cancer Stage at diagnosis: II, interval progression to Stage IV    Primary Care Physician:  Lena Cunningham MD     Nickname: Krystian Minnie    Spouse: Anurag Bertrand    Granddaughter: Granddaughter, Sil Pritchard     Original ECO    Today's ECO    Goals and Barriers:  Current Goal: Minimize effects of disease burden, extend life  Barriers to accomplishing this: persistent fatigue and weakness, depression, anxiety, worry, L foot claudication, lower back pain from spinal stenosis  Has been requiring walkers/canes when he outside of home  Patient's Capacity to Self Care:  Patient is able to self care    Code Status: Full code    Advanced directives: not on file but I recommended he get that done    ASSESSMENT & PLAN      Diagnosis ICD-10-CM Associated Orders   1   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)  C67 3         This is a [de-identified] y o  c PMHx notable for spinal stenosis, Gastric bypass (), 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, diabetic nephropathy, and renal vascular disease, DM, being seen as follow-up for his bladder cancer  From an overall performance status basis, patient can tolerate systemic treatment due to an ECOG PS of 2  Thoughts on approach to systemic therapy in the first line and subsequent lines of therapy:    Preferred first line would have been cisplatin/gemcitabine however patient's overall performance status and kidney function were not amenable to this  Thoughts on prediction for Grade III-V toxicity in elderly patients to systemic chemotherapy:  Age >72 years   GI/ cancers  Falls in last 6 months   Hearing impairment (b/l hearing aids)  Limited ability to walk 1 block  Requires assistance with medications  Decreased social activities   Gwen Barnes JCO 2011)  BMI<18 5 or moderate or severe weight loss or decrease in food intake in past 3 months  Mild vs moderate dementia/depression     The above 5 factors predict toxicity for the patient if he undergoes systemic chemotherapy and they were discussed with the patient  Discussion of disease response monitoring: We discussed with the patient at length the role of imaging and potential blood monitoring of burden of disease  We will opt to get CT chest, abdomen, pelvis without contrast due to kidney function as surveillance during and following therapy  As there was been progression of disease, liquid biopsy was performed to assess actionable biomarkers and determine bio-marker status  1/27/22 pt only got 1/2 of Days 1-5 5-FU due to contents spilling  Pt completed concurrent 5-FU/MTC + RT and found to have POD on 6/28/2022 PET/CT (lung mets) and had 2 cycles of q6 week Keytruda before POD  Decision made to transition to second line therapy for increased objective response and time to response due to significant burden of disease      During pt's office visit on 9/2/22, decision was made to start the patient on enfortumab vedotin due to progression of disease even on Keytruda; risks and benefits of the therapy was discussed with the patient and informed consent was signed to proceed with enfortumab  Patient did require 1 more round of thoracentesis for rapid accumulation of pleural fluid causing shortness of breath on 9/14/22; 2L of fluid was removed  He had 3 infusions of Enfortumab before multiple admissions  Discussion of decision making  • Oncology history updated, accordingly, during this visit  • Goals of care/patient communication  o I discussed with the patient the clinical course leading up to their cancer diagnosis  I reviewed relevant office notes, imaging reports and pathology result as well   o I told the patient that this is a case of incurable disease and what this means  We discussed that the goal of anti-cancer therapy is to provide best quality of life, extend overall survival, and progression free survival as shown in clinical trials  We also discussed that there might be a point when the cancer will no longer respond to this anti-neoplastic therapy and thus he is seeing palliative care    o I explained the risks/benefits of the proposed cancer therapy: Enfortumab and after discussion including understanding risks of possible life-threatening complications and therapy-related malignancy development, informed consent for treatment has been signed  • TNM/Staging At Diagnosis  Cancer Staging  Malignant neoplasm of anterior wall of urinary bladder ([facility] [address] )  Staging form: Urinary Bladder, AJCC 8th Edition  - Clinical stage from 10/18/2021: Stage II (cT2, cN0, cM0) - Signed by William Carrillo MD on 11/17/2021  Stage prefix: Initial diagnosis  Clinical staging from January 2022: Stage II has progressed to Stage IV bladder cancer given retroperitoneal lymphadenopathy and concern for metastasis  • Disease Features/Tumor Markers/Genetics  o Tumor Marker: n/a  o Notable Path Features: anterior wall Invasive high-grade urothelial carcinoma   Carcinoma invades muscularis propria (detrusor muscle)   Guardant 360 showing CDK4 amplification, TP53 mutation  • Current Treatment:  Enfortumab  • Other Supportive care:   • Treatment Team Members  o Surgeon: Dr Krishna Hahn  o Rad Onc: Dr Tony Schaffer  o Palliative: Sandy moreno Nephrology: Dr Sam Meckel  • Labs: creat 2 64 (eGFR 22), normocytic anemia, Hgb 9 4  • Diagnostics: 11/11/21 CT CAP w/o c: 1  No metastatic disease within the limitations of noncontrast technique in the chest abdomen or pelvis  2   Diffuse hemicircumferential smooth bladder wall thickening on the left, similar to the prior study when allowing for differences in bladder distention  Prostatomegaly  6/3/2022 CT CAP w/o c: Development of a few pulmonary nodules, the largest measuring 8 mm  A PET CT would be recommended  Worsening wall thickening of the urinary bladder which may represent post treatment changes  Cannot exclude cystitis or progression of cancer  6/28/2022 PET/CT: read pending, per my assessment lung metastatic hypermetabolic nodules as well as L chest wall nodule  8/26/2022 CT CAP w/o c: Pt with new large L pleural effusion contributing to his pleuritic CP  There is now bulky retroperitoneal lymphadenopathy, not seen on the prior study  The largest lymph node measures approximately 3 2 x 2 7 cm and is located to the left of the aorta concerning for metastatic disease  There is a lobulated 1 cm nodule in the left upper lobe on series 3, image 85 not seen on the prior study  There is a 7 mm nodule in the right middle lobe, increased in size since prior study with surrounding groundglass density, nonspecific  Stable 2 mm nodule right middle lobe series 3, image 85  Previously noted nodule adjacent to the major fissure in the lingula is obscured by patchy airspace consolidation  8/30/2022 NM Bone scan: No scintigraphic evidence of osseous metastasis  Port is without acute issues    10/4/2022: CT CAP w/o c: Stable disease after 1 cycle of treatment, lymph nodes, improved left para-aortic lymphadenopathy compared to 8/26/2022, Stable eccentrically thickened bladder wall anterolaterally, which is underdistended, likely known bladder neoplasm  Increase in size of a subsolid pulmonary nodule in the inferior lingular segment, unclear if infectious/inflammatory or progression of metastatic disease  Improved trace left pleural effusion with indwelling pleural catheter  Improved left para-aortic lymphadenopathy, measuring up to 2 6 x 2 2 cm (series 2, image 77) at the level of L2-3, previously 3 2 x 2 7 cm  There is a 1 7 x 1 7 cm left para-aortic lymph node at the level of L4 (series 2, image 85), previously 2 0 x 2 0 cm   1/19/2023 CT Abd/pelv with R hepatic lesions are new since November, L para-aortic LN 1 7cm (stable)  Low-attenuation lesions in the right lobe the liver are suboptimally evaluated due to lack of intravenous contrast as well as some degree of respiratory motion artifact  A lesion in segment 5 (2/24) measures 1 5 x 1 2 cm (previously 0 9 x 0 8 cm on study dated 1/7/2023), a lesion in segment 6 (2/26) measures 2 2 x 1 9 cm (previously 1 5 x 1 7 cm), and a lesion in segment 6 (2/28) measures 1 4 x 1 1 cm (previously 0 8 x 0 7 cm)  3/9/2023 CT CAP w/o c: Interim progression of metastatic disease, likely reflecting the patient's known urothelial carcinoma  In addition to primary site in the urinary bladder, metastatic changes involve the left pleural space, left chest wall, multiple osseous structures, the liver, and abdominal/pelvic adenopathy     Enfortumab:   Overall, 55 participants (44%) with KUMAR  15 patients had CR  Responses to treatment lasted a median of mPFS 7 6 months  Discussion of decision making  I personally reviewed the case with Dr Rosa Castrejon team and the following lab results, the image studies, pathology, other specialty/physicians consult notes and recommendations, and outside medical records from [facility] institutions   I had a lengthy discussion with the patient and shared the work-up findings  We discussed the diagnosis and management plan as below  I spent 34 minutes reviewing the records (labs, clinician notes, outside records, medical history, ordering medicine/tests/procedures, interpreting the imaging/labs previously done) and coordination of care as well as direct time with the patient today, of which greater than 50% of the time was spent in counseling and coordination of care with the patient/family  · Plan/Labs  · D/c Enfortumab q28 cycles, we discussed a switch to Sacituzumab which they will consider  · F/u Thoracic surgery prn  · F/u palliative care for insomnia issues, pain  · Continue to assess for signs of distress as he has anxiety/depression  F/u Palliative for cancer associated pain  · F/u Urology for care and management of neph tubes  · Restaging CT CAP depending on above    Vivienne Underwood MD  Hematology, Oncology Staff Physician    Follow Up:  Every 4 weeks while on q28 day systemic therapy (scheduled until 12/16/2022)    All questions were answered to the patient's satisfaction during this encounter  The patient knows the contact information for our office and knows to reach out for any relevant concerns related to this encounter  They are to call for any temperature 100 4 or higher, new symptoms including but not restricted to shaking chills, decreased appetite, nausea, vomiting, diarrhea, increased fatigue, shortness of breath or chest pain, confusion, and not feeling the strength to come to the clinic  For all other listed problems and medical diagnosis in their chart - they are managed by PCP and/or other specialists, which the patient acknowledges  Thank you very much for your consultation and making us a part of this patient's care  We are continuing to follow closely with you  Please do not hesitate to reach out to us with any additional questions or concerns        [address] Leopolda         Oncology History   Bladder carcinoma ([facility] [address] )   8/16/2016 Initial Diagnosis    Bladder carcinoma ([facility] [address] )     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)    Initial Diagnosis    Malignant neoplasm of anterior wall of urinary bladder ([facility] [address] )     1994 Surgery    TURBT      1994 - 1995 Chemotherapy    Induction intravesical BCG x 2      8/17/2016 Surgery    TURBT      12/13/2016 Surgery    TURBT  [facility])    Bladder, left posterior wall, biopsy: High-grade urothelial carcinoma in-situ  Muscularis propria is identified with no tumor seen  Bladder, trigone, biopsy: Non-invasive high-grade papillary urothelial carcinoma, and flat urothelial carcinoma in-situ  Muscularis propria is not identified  1/4/2017 - 2/8/2017 Chemotherapy    Induction intravesical BCG     6/19/2017 Surgery    TURBT  [facility])    - Posterior wall, biopsy: Mild chronic cystitis with focal urothelial atypia  Muscularis propria is identified      - Right lateral wall, biopsy: Granulomatous cystitis  Muscularis propria is identified  - Left lateral wall, biopsy: Non invasive high-grade urothelial carcinoma  Muscularis propria is not identified  4/26/2019 Surgery    TURBT   [facility])     - bladder, right posterior wall, biopsy: Urothelial carcinoma in situ   - bladder, right lateral wall, biopsy: Small focus of urothelial carcinoma in situ  - bladder, left posterior wall, biopsy: Urothelial carcinoma in situ   - bladder, left lateral wall, biopsy: Urothelial carcinoma in situ     - bladder, trigone, biopsy: Invasive high-grade urothelial carcinoma, invading into lamina propria  No lymphovascular invasion seen  Muscularis propria not present     Separate piece showing urothelial carcinoma in situ        7/2019 -  Chemotherapy    Induction intravesical BCG x 4      11/4/2019 Surgery    TURBT  [facility])    - Superficial bladder tumor, transurethral resection: Non-invasive high grade urothelial carcinoma, with urothelial carcinoma in situ  Muscularis propria is not identified      - Bladder tumor, transurethral resection: Non-invasive high grade urothelial carcinoma, with urothelial carcinoma in situ, see note  Muscularis propria is present and free of tumor  12/9/2019 - 1/28/2020 Chemotherapy    Induction intravesical BCG+INF        6/9/2020 - 6/23/2020 Chemotherapy    Maintenance intravesical BCG+INF        11/19/2020 Biopsy    TURBT ([facility] Mott, Dr Collin Urban)    A  Urinary Bladder, Left Posterior Bladder Wall:  -Extensively- denuded urothelial lined mucosa with mild chronic inflammation, vascular congestion  And edema   -Unremarkable small fragment of detrusor muscle present  -No evidence of invasive urothelial carcinoma seen     B  Urinary Bladder, Left lateral bladder Wall:  -Partially-denuded urothelial lined mucosa with mild  chronic inflammation  -Unremarkable small fragment of detrusor muscle present  -No evidence of invasive urothelial carcinoma seen     C  Urinary Bladder, Right Posterior Bladder wall:  -Urothelial lined mucosa with mild  chronic inflammation Von Brunn nests and mild urothelial atypia, possibly due to previous BCG administration   -Unremarkable small fragment of detrusor muscle present  -No evidence of invasive urothelial carcinoma seen     D  Urinary Bladder, Right Lateral Bladder Wall:  - Urothelial lined mucosa with mild  chronic inflammation   -Muscularis propria/ detrusor muscle is not present for evaluation    -No evidence of invasive urothelial carcinoma seen  E  Prostate, Prostate Tissue:  -Urothelial lined mucosa with mild chronic inflammation, prominent Von Brunn nests and Cystitis cystica   -Unremarkable small fragment of detrusor muscle present   -No evidence of malignancy seen               10/18/2021 -  Cancer Staged    Staging form: Urinary Bladder, AJCC 8th Edition  - Clinical stage from 10/18/2021: Stage II (cT2, cN0, cM0) - Signed by Katie Dean MD on 11/17/2021  Stage prefix: Initial diagnosis       10[facility]2021 [facility]    A  Left posterior wall, bladder (transurethral resection):     - Urothelial tissue with small atypical cauterized focus favored to represent superficially invasive high-grade urothelial carcinoma  - Muscularis propria (detrusor muscle) present, and negative for carcinoma  - Marked edema and mucosal denudation with thermal artifact noted  B  Anterior wall, bladder (transurethral resection):      - Invasive high-grade urothelial carcinoma  - Carcinoma invades muscularis propria (detrusor muscle)  C   Left lateral wall, bladder (transurethral resection):     - Urothelial tissue with small atypical focus with marked thermal artifact; cannot exclude superficial carcinoma  - Muscularis propria (detrusor muscle) present, and negative for carcinoma        - Marked edema and mucosal denudation with thermal artifact noted     1/24/2022 - 3/26/2022 Chemotherapy    mitoMYcin (MUTAMYCIN), 12 mg/m2 = 28 7 mg (100 % of original dose 12 mg/m2), Intravenous, Once, 1 of 1 cycle  Dose modification: 12 mg/m2 (original dose 12 mg/m2, Cycle 1), 3 mg/m2 (original dose 12 mg/m2, Cycle 1)  Administration: 7 2 mg (1/24/2022)  fluorouracil (ADRUCIL) ambulatory infusion Soln, 500 mg/m2/day = 4,780 mg (50 % of original dose 1,000 mg/m2/day), Intravenous, Over 96 hours, 1 of 1 cycle, Start date: 1/18/2022, End date: 1/23/2022  Dose modification: 500 mg/m2/day (original dose 1,000 mg/m2/day, Cycle 1, Reason: Dose modified as per discussion with consulting physician)     1/24/2022 - 3/24/2022 Radiation    Pelvis:1 6X 21 / 21 275 0 5,775 59      Treatment dates:  C1: 1/24/2022 - 3/24/2022     7/15/2022 - 8/26/2022 Chemotherapy    pembrolizumab (KEYTRUDA) IVPB, 400 mg, Intravenous, Once, 2 of 6 cycles  Administration: 400 mg (7/15/2022), 400 mg (8/26/2022)     9/16/2022 -  Chemotherapy    enfortumab vedotin-ejfv (PADCEV) IVPB, 125 mg (original dose 1 25 mg/kg), Intravenous, Once, 4 of 6 cycles  Dose modification: 125 mg (original dose 1 25 mg/kg, Cycle 1, Reason: Dose modified as per discussion with consulting physician), 1 mg/kg (original dose 1 25 mg/kg, Cycle 3, Reason: Anticipated Tolerance, Comment: falling more)  Administration: 120 mg (9/16/2022), 120 mg (9/23/2022), 120 mg (9/30/2022), 120 mg (10/14/2022), 120 mg (10/21/2022), 120 mg (10/28/2022), 120 mg (11/11/2022), 120 mg (11/25/2022), 100 mg (12/2/2022)       He had been getting BCG vaccine for his bladder since 1994  7/15 + 8/26/2022: Keytruda 2 doses    6/28/2022 PET/CT: read pending, per my assessment lung metastatic hypermetabolic nodules as well as L chest wall nodule  9/14/2022: underwent thoracentesis, removed 2 L of fluid  9/22/2022: pleurx catheter care  11/17: 2 day admission, hematuria, 1U PRBC  Defer C4 until 1/27 12/2/2022 tx reduced to 1mg/kg due to falls  12/2022 admission: status post cystoscopy clot EVAC and fulguration retrograde pyelogram on the right with right ureteral stent insertion  1/7: admission for gross hematuria, placing Vanessa Minion was hospitalized for roughly 2 months and while in the hospital they were monitoring his pleurex drainage  Per [facility] there has been no drainage for almost 2 months and the catheter is starting to cause Emiliano pain    SUBJECTIVE  (INTERVAL HISTORY)      Clotting History None   Bleeding History No major events   Cancer History Bladder   Family Cancer History None   H/O Blood/Plt Transfusion None   Tobacco/etoh/drug abuse 1 5 PPD x 17 years (quit 1970), no other abuse   Hx COVID19 Infection and Vaccine Status Pfizer x 3 (had booster 9/2021)   Cancer Screening history n/a   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)       His maximum weight prior to the gastric bypass that he had was 330lbs  I have reviewed the relevant past medical, surgical, social and family history   I have also reviewed allergies and medications for this patient  Interval events: he is having a lot of back pain and around the site where the chest tube was placed  He is on pain medicines  Not having much dysuria  Not very good sleep  Not having any falls, does slip out of bed  He has had some intermittent constipation  Review of Systems  Denies unintentional weight loss, F/C, N/V, CP, diarrhea, rash, itching, melena, hematuria, hematochezia  Chronic mild SOB with exertion and fatigue  A 10-point review of system was performed, pertinent positive and negative were detailed as above  Otherwise, the 10-point review of system was negative  Past Medical History:   Diagnosis Date   • Anemia     Last assessed: 9/28/17   • Anxiety    • Arteriosclerotic cardiovascular disease     Last assessed: 9/28/17   • Arthritis    • Bladder cancer ([facility] )     bladder- had BCG treatments   • Chronic kidney disease     Stage IV   • CKD (chronic kidney disease) stage 4, GFR 15-29 ml/min ([facility])    • Colon polyp    • Coronary artery disease     7 stents   • Depression    • Diabetes mellitus ([facility])     IDDM   • GERD (gastroesophageal reflux disease)    • Glaucoma    • Hematuria    • History of fusion of cervical spine    • Hyperlipidemia    • Hypertension    • Insomnia     Last assessed: 11/14/12   • Loss of hearing     has hearing aids but usually does not wear them   • Lung cancer ([facility] )    • Metastatic cancer ([facility] )    • Other seasonal allergic rhinitis     Last assessed: 2/10/16   • PAD (peripheral artery disease) ([facility])    • Shortness of breath     on exertion   • Spinal stenosis of lumbar region    • Transient cerebral ischemia     No Residual   • Uses walker     w/c for longer distances       Past Surgical History:   Procedure Laterality Date   • CARDIAC SURGERY      Cath stent placement  Last assessed: 3/9/17  Interventional Catheterization   • CHOLECYSTECTOMY     • COLONOSCOPY     • CYSTOSCOPY      Diagnostic w/biopsy  Jared Hernandez  Last assessed: 12/1/14   • CYSTOSCOPY N/A 04/12/2022    Procedure: CYSTOSCOPY  Bladder biopsies  ;  Surgeon: Heavenly Boland MD;  Location: [facility];  Service: Urology   • CYSTOSCOPY W/ RETROGRADES Right 03/01/2022    Procedure: CYSTO; stent removal retrograde;  Surgeon: Heavenly Boland MD;  [facility]: [facility];  Service: Urology   • CYSTOSCOPY W/ URETERAL STENT PLACEMENT Bilateral 10/18/2021    Procedure: bilateral retrogrades, cytology collection;  Surgeon: Heavenly Boland MD;  [facility];  Service: Urology   • CYSTOURETHROSCOPY      w/cautery  Jaredmark Hernandez   • FL RETROGRADE PYELOGRAM  10/18/2021   • FL RETROGRADE PYELOGRAM  10/24/2021   • FL RETROGRADE PYELOGRAM  12/14/2022   • GASTRIC BYPASS      For morbid obesity w/Shaji-en-Y   Resolved: 11/17/09   • INCISION AND DRAINAGE OF WOUND Right 02/26/2017    Procedure: INCISION AND DRAINAGE (I&D) EXTREMITY WITH APPLICATION OF GRAFT JACKET;  Surgeon: Ashley Banuelos DPM;  Location: [facility];  Service:    • INCISION AND DRAINAGE OF WOUND Right 04/25/2017    Procedure: INCISION AND DRAINAGE (I&D) EXTREMITY, APPLICATION OF GRAFT;  Surgeon: Ashley Banuelos DPM;  [facility];  Service:    • IR BIOPSY OTHER  07/02/2020   • IR LOWER EXTREMITY ANGIOGRAM  02/08/2021   • IR LOWER EXTREMITY ANGIOGRAM  02/11/2021   • IR NEPHROSTOMY TUBE CHECK/CHANGE/REPOSITION/REINSERTION/UPSIZE  04/28/2022   • IR NEPHROSTOMY TUBE CHECK/CHANGE/REPOSITION/REINSERTION/UPSIZE  05/24/2022   • IR NEPHROSTOMY TUBE CHECK/CHANGE/REPOSITION/REINSERTION/UPSIZE  06/07/2022   • IR NEPHROSTOMY TUBE CHECK/CHANGE/REPOSITION/REINSERTION/UPSIZE  07/28/2022   • IR NEPHROSTOMY TUBE CHECK/CHANGE/REPOSITION/REINSERTION/UPSIZE  11/15/2022   • IR NEPHROSTOMY TUBE CHECK/CHANGE/REPOSITION/REINSERTION/UPSIZE  1/10/2023   • IR NEPHROSTOMY TUBE PLACEMENT  02/25/2022   • IR PORT PLACEMENT  01/17/2022   • IR PORT REMOVAL  1/10/2023   • IR THORACENTESIS  09/02/2022   • IR THORACENTESIS  09/14/2022   • IR THORACENTESIS WITH TUBE PLACEMENT Left     october   • IR TUNNELED CENTRAL LINE PLACEMENT  12/24/2020   • JOINT REPLACEMENT      christofer knees replaced   • MO AMPUTATION METATARSAL W/TOE SINGLE Left 12/21/2020    Procedure: RAY RESECTION FOOT;  Surgeon: Tony Knox DPM;  Location: [facility];  Service: Podiatry   • MO AMPUTATION METATARSAL W/TOE SINGLE Left 12/31/2020    Procedure: 5TH MET RESECTION;  Surgeon: Tony Knox DPM;  [facility];  Service: Podiatry   • MO CYSTO BLADDER W/URETERAL CATHETERIZATION Right 12/14/2022    Procedure: CYSTOSCOPY WITH RETROGRADE PYELOGRAM and CLOT EVACUATION, RIGHT STENT INSERTION, FULGRATION OF BLEEDING POINTS;  Surgeon: Beryl Jackson MD;  [facility];  Service: Urology   • MO CYSTO W/IRRIG & EVAC MULTPLE OBSTRUCTING CLOTS N/A 02/10/2021    Procedure: CYSTOSCOPY EVACUATION OF CLOTS, fulguration;  Surgeon: Kellie Garcia MD;  [facility];  Service: Urology   • MO CYSTO W/IRRIG & EVAC MULTPLE OBSTRUCTING CLOTS N/A 10/24/2021    Procedure: CYSTOSCOPY EVACUATION OF CLOT, fulguration of bleeding vessels, right ureter stent placement, retrograde pyelogram;  Surgeon: Kofi Mendez MD;  [facility];  Service: Urology   • MO CYSTO W/REMOVAL OF LESIONS SMALL N/A 11/19/2020    Procedure: CYSTO W/BIOPSIES, transurethral prostate bx;  Surgeon: Bill Martinez MD;  [facility];  Service: Urology   • MO CYSTOURETHROSCOPY W/DEST &/RMVL TUMOR LARGE Bilateral 10/18/2021    Procedure: TRANSURETHRAL RESECTION OF BLADDER TUMOR (TURBT);   Surgeon: Sumit Mccann MD;  [facility];  Service: Urology   • MO CYSTOURETHROSCOPY WITH BIOPSY N/A 08/16/2016    Procedure: Florian Berger;  Surgeon: Rashard Lebron MD;  [facility];  Service: Urology   • MO Yaneli Herd 3RD+ ORD [address] PEL/LXTR [facility] Left 02/08/2021    Procedure: LEG angiogram, CO2 w/limited contrast with balloon angioplasty postertior tibial artery;  Surgeon: Laine Goddard MD;  [facility];  Service: Vascular   • ROTATOR CUFF REPAIR     • SMALL INTESTINE SURGERY      Surgery Shaji-en-Y   • SPINAL FUSION      lumbar and cervical fusions   • VAC DRESSING APPLICATION Right 10/91/7869    Procedure: APPLICATION VAC DRESSING;  Surgeon: Felicity Vásquez DPM;  [facility];  Service:    • WOUND DEBRIDEMENT Left 2021    Procedure: FOOT DEBRIDE, [address] OUT w/graft application;  Surgeon: Felicity Vásquez DPM;  [facility];  Service: Podiatry       Family History   Problem Relation Age of Onset   • Diabetes Mother    • Heart disease Mother    • Other Mother         High blood pressure   • Heart disease Father    • Diabetes Sister    • Other Sister         High blood pressure   • Kidney disease Sister    • Heart disease Brother    • Other Brother         High blood pressure       Social History     Socioeconomic History   • Marital status: /Civil Union     Spouse name: Not on file   • Number of children: Not on file   • Years of education: Not on file   • Highest education level: Not on file   Occupational History   • Not on file   Tobacco Use   • Smoking status: Former     Packs/day: 3 00     Types: Cigarettes     Start date: 54     Quit date:      Years since quittin 2     Passive exposure: Past   • Smokeless tobacco: Never   Vaping Use   • Vaping Use: Never used   Substance and Sexual Activity   • Alcohol use: Not Currently     Comment: occasional in the past   • Drug use: Not Currently     Types: Marijuana     Comment: quit 2019 had medical marijuana   • Sexual activity: Not Currently   Other Topics Concern   • Not on file   Social History Narrative    Consumes 1 cup of coffee and 1 soda per day     Social Determinants of Health     Financial Resource Strain: Not on file   Food Insecurity: No Food Insecurity   • Worried About Running Out of Food in the Last Year: Never true   • Ran Out of Food in the Last Year: Never true   Transportation Needs: No Transportation Needs   • Lack of Transportation (Medical): No   • Lack of Transportation (Non-Medical):  No   Physical Activity: Not on file   Stress: Not on file   Social Connections: Not on file   Intimate Partner Violence: Not on file   Housing Stability: Low Risk    • Unable to Pay for Housing in the Last Year: No   • Number of Places Lived in the Last Year: 1   • Unstable Housing in the Last Year: No       Allergies   Allergen Reactions   • Statins Hives and Itching     Includes atorvastatin and simvastatin     • Insulin Lispro Swelling and Edema     " Lower Legs"   • Other Itching, Rash and Other (See Comments)     "EKG Patches"   "blue EKG patches"       Current Outpatient Medications   Medication Sig Dispense Refill   • Accu-Chek Softclix Lancets lancets Test blood sugar 4 times daily 200 each 0   • acetaminophen (TYLENOL) 325 mg tablet Take 650 mg by mouth every 6 (six) hours as needed for mild pain       • ARIPiprazole (ABILIFY) 2 mg tablet Take 2 mg by mouth daily     • aspirin (ECOTRIN LOW STRENGTH) 81 mg EC tablet Take 1 tablet (81 mg total) by mouth daily     • Bimatoprost (LUMIGAN OP) Apply 1 drop to eye daily at bedtime      • bisacodyl (DULCOLAX) 10 mg suppository Insert 1 suppository (10 mg total) into the rectum daily 12 suppository 0   • docusate sodium (COLACE) 100 mg capsule Take 100 mg by mouth 2 (two) times a day     • gabapentin (NEURONTIN) 300 mg capsule TAKE ONE CAPSULE BY MOUTH EVERY DAY AT BEDTIME 90 capsule 0   • Hydromorphone HCl 1 MG/ML LIQD Take 2 mL (2 mg total) by mouth every 4 (four) hours as needed (breakthrough pain) for up to 10 days Max Daily Amount: 12 mg 48 mL 0   • hyoscyamine (LEVSIN/SL) 0 125 mg SL tablet Take 1 tablet (0 125 mg total) by mouth every 4 (four) hours as needed (increased secretions) 24 tablet 0   • Insulin Pen Needle 31G X 8 MM MISC Inject 3 times a day 100 each 2   • lidocaine (LIDODERM) 5 % Apply 1 patch topically over 12 hours daily Remove & Discard patch within 12 hours or as directed by MD Do not start before March 14, 2023  14 patch 0   • metoprolol tartrate (LOPRESSOR) 25 mg tablet Take 1 tablet (25 mg total) by mouth every 12 (twelve) hours 60 tablet 0   • mirtazapine (REMERON) 7 5 MG tablet Take 1 tablet (7 5 mg total) by mouth daily at bedtime for 30 doses 30 tablet 0   • Morphine Sulfate, Concentrate, 20 mg/mL concentrated solution Take 0 25 mL (5 mg total) by mouth every 3 (three) hours as needed for severe pain Max Daily Amount: 40 mg 30 mL 0   • naloxone (NARCAN) 4 mg/0 1 mL nasal spray Administer 1 spray into a nostril  If no response after 2-3 minutes, give another dose in the other nostril using a new spray  1 each 0   • omeprazole (PriLOSEC) 20 mg delayed release capsule Take 1 capsule (20 mg total) by mouth daily in the early morning PRN (Patient not taking: Reported on 3/3/2023) 90 capsule 0   • ondansetron (ZOFRAN-ODT) 4 mg disintegrating tablet Take 1 tablet (4 mg total) by mouth 4 (four) times a day 20 tablet 0   • oxybutynin (DITROPAN) 5 mg tablet Take 1 tablet (5 mg total) by mouth 3 (three) times a day  0   • oxyCODONE (ROXICODONE) 15 mg immediate release tablet Take 1 tablet (15 mg total) by mouth every 4 (four) hours for 10 days Max Daily Amount: 90 mg 15 tablet 0   • polyethylene glycol (MIRALAX) 17 g packet Take 17 g by mouth daily 30 each 0   • senna (SENOKOT) 8 6 MG tablet Take 2 tablets (17 2 mg total) by mouth 2 (two) times a day 90 tablet 0     No current facility-administered medications for this visit  (Not in a hospital admission)      Objective:     24 Hour Vitals Assessment:     There were no vitals filed for this visit  Weight at last visit: 212 lbs    Physical exam not done due to telemedicine visit     DATA REVIEW:    Pathology Result:    Final Diagnosis   Date Value Ref Range Status   09/22/2022   Final    A -B    Pleural Fluid, Left (Thin-prep and cell block preparations):    Negative for malignancy  Benign mesothelial cells and histiocytes  Scattered lymphocytes and neutrophils  Proteinaceous material      Satisfactory for evaluation  Comment: Immunohistochemistry for MOC-31 and BerEp4 is negative  CD68 stains numerous histiocytes, D2-40 stains rare mesothelial cells, and Gata3 stains inflammatory cells  09/02/2022   Final    A B  Pleural fluid, Left, Thoracentesis (ThinPrep and cell block preparations):  Negative for malignancy  Benign mesothelial cells, lymphocytes and histiocytes  Satisfactory for evaluation  04/12/2022   Final    A  Urinary Bladder, selected bladder biopsies:  - Focally intact urothelial mucosa with associated acutely inflamed granulation tissue  See comment  B  Prostatic urethra:  - Focally intact urothelial mucosa with associated acutely inflamed granulation tissue  See comment  Comment: Immunohistochemistry for AE1/3 is negative for an infiltrative pattern  Clinical history notable for status post chemotherapy and urine culture positive for Serratia marcescens  The findings are compatible with infectious cystitis  10/18/2021   Final    A  Left posterior wall, bladder (transurethral resection):     - Urothelial tissue with small atypical cauterized focus favored to represent superficially invasive high-grade urothelial carcinoma  - Muscularis propria (detrusor muscle) present, and negative for carcinoma  - Marked edema and mucosal denudation with thermal artifact noted  B  Anterior wall, bladder (transurethral resection):      - Invasive high-grade urothelial carcinoma  - Carcinoma invades muscularis propria (detrusor muscle)  C   Left lateral wall, bladder (transurethral resection):     - Urothelial tissue with small atypical focus with marked thermal artifact; cannot exclude superficial carcinoma  - Muscularis propria (detrusor muscle) present, and negative for carcinoma        - Marked edema and mucosal denudation with thermal artifact noted  Comment: This is an appended report  These results have been appended to a previously preliminary verified report  10/18/2021   Final    A  Renal Washing, RIGHT RENAL PELVIS WASHING:  Suspicious for high grade urothelial carcinoma [facility]) - see comment  Comment:  The above diagnostic category is from the recently published book, [employer] [place] [employer] Urinary Cytology, and is in keeping with the ongoing effort for utilization of standardized diagnostic terminology in urine cytology  *    *[employer] [place] [employer] Urinary Cytology  Renetta L  Cali Hernandez [place]; 2016  B  Renal Washing, LEFT RENAL PELVIS WASHING:  Atypical urothelial cells (AUC) - see comment  Comment:  The above diagnostic category is from the recently published book, [employer] [place] [employer] Urinary Cytology, and is in keeping with the ongoing effort for utilization of standardized diagnostic terminology in urine cytology  *    *[employer] [place] [employer] Urinary Cytology  Renettaisadora Buckleydestiny [place]; 2016          09/03/2021   Final    A  Urine, Voided, :  Atypical urothelial cells (AUC) - see comment  Red blood cells     Satisfactory for evaluation  Comment:    - Few atypical urothelial cells noted in this patient with a 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  A high grade urothelial carcinoma cannot be excluded on this material  Suggest clinical correlation and follow-up as indicated  - The above diagnostic category is from the recently published book, [employer] [place] [employer] Urinary Cytology, and is in keeping with the ongoing effort for utilization of standardized diagnostic terminology in urine cytology  *    *[employer] [place] [employer] Urinary Cytology  Renetta [place]; 2016 ' 12/31/2020   Final    A  Left 5th metatarsal bone:  - Acute osteomyelitis in benign metatarsal bone  12/21/2020   Final    A  Bone, left 5th metatarsal, amputation:       - Acute osteomyelitis  - No dysplasia or malignancy is identified  B  Bone, 5th metatarsal clean margin, excision:       - Focal acute osteomyelitis  - Large caliber blood vessels with luminal calcifications and greater than 75% occlusion        - No dysplasia or malignancy is identified  Interpretation performed at [address] [address]       11/19/2020   Final    A  Urinary Bladder, Left Posterior Bladder Wall:  -Extensively- denuded urothelial lined mucosa with mild chronic inflammation, vascular congestion  And edema   -Unremarkable small fragment of detrusor muscle present  -No evidence of invasive urothelial carcinoma seen    B  Urinary Bladder, Left lateral bladder Wall:  -Partially-denuded urothelial lined mucosa with mild  chronic inflammation  -Unremarkable small fragment of detrusor muscle present  -No evidence of invasive urothelial carcinoma seen    C  Urinary Bladder, Right Posterior Bladder wall:  -Urothelial lined mucosa with mild  chronic inflammation Von Brunn nests and mild urothelial atypia, possibly due to previous BCG administration   -Unremarkable small fragment of detrusor muscle present  -No evidence of invasive urothelial carcinoma seen    D  Urinary Bladder, Right Lateral Bladder Wall:  - Urothelial lined mucosa with mild  chronic inflammation   -Muscularis propria/ detrusor muscle is not present for evaluation    -No evidence of invasive urothelial carcinoma seen  E  Prostate, Prostate Tisssue:  -Urothelial lined mucosa with mild chronic inflammation, prominent Von Brunn nests and Cystitis cystica   -Unremarkable small fragment of detrusor muscle present   -No evidence of malignancy seen             08/17/2020   Final    A  Urine, Other, :  Negative for high grade urothelial carcinoma ([address]) - see comment  Urothelial cells, squamous cells, red blood cells and neutrophils  Satisfactory for evaluation  Comment:  The above diagnostic category is from the recently published book, [employer] [place] [employer] Urinary Cytology, and is in keeping with the ongoing effort for utilization of standardized diagnostic terminology in urine cytology  *    *[employer] [place] [employer] Urinary Cytology  Renetta Cariaseroaminta; 2016 08/16/2016   Final    A   Bladder, biopsy:  -  High-grade urothelial carcinoma with flat and focal papillary architecture (see note)  07/18/2016   Final    A  Urine, clean catch (ThinPrep): Atypical urothelial cells (AUC) - see comment  Atypical single urothelial cells, benign squamous cells, red blood cells and neutrophils  Background of degenerative changes noted  Satisfactory for evaluation  Comment:  The above diagnostic category is from the recently published book, [employer] [place] [employer] Urinary Cytology, and is in keeping with the ongoing effort for utilization of standardized diagnostic terminology in urine cytology  *    *[employer] [place] [employer] Urinary Cytology  Renetta Trejo; 2016 01/18/2016   Final    A  Urine, Unspecified Source, :  Rare cluster of degenerated appearing urothelial cells present; see note  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  Few red blood cells also identified  Satisfactory for evaluation  Image Results:   Image result are reviewed and documented in Hematology/Oncology history    CT chest abdomen pelvis wo contrast  Narrative: CT CHEST, ABDOMEN AND PELVIS WITHOUT IV CONTRAST    INDICATION:   follow up imaging    High-grade urothelial bladder neoplasm  leukocytosis    COMPARISON:  CT abdomen pelvis 1/19/2023; PET/CT 3/1/2023    TECHNIQUE: CT examination of the chest, abdomen and pelvis was performed without intravenous contrast  Axial, sagittal, and coronal 2D reformatted images were created from the source data and submitted for interpretation  Radiation dose length product (DLP) for this visit:  701 mGy-cm   This examination, like all CT scans performed in the [facility], was performed utilizing techniques to minimize radiation dose exposure, including the use of iterative   reconstruction and automated exposure control  Enteric contrast was not administered  FINDINGS:    CHEST    LUNGS:  Right lung is clear  There is worsening scattered opacification in the left lower lobe with more focal masslike opacification in the dependent left lower lobe, similar to that of the PET CT 3/1/2023, most of which likely reflects scarring or   atelectasis  Central airways appear patent  PLEURA:  There is again noted moderate nodular pleural fluid and soft tissue thickening in the left base with prominent soft tissue focus anteriorly and laterally similar to that of the PET/CT from 3/1/2023 but increased when compared to the CT of   1/19/2023  There is involvement of the left chest wall by this likely malignant process with erosion/destruction of multiple ribs as noted on the PET/CT, much more extensive than on the CT dated 1/19/2023  Mottled attenuation of anterior left 3rd rib   suggests tumor involvement as well  Some loculated fluid in the left lung apex noted  HEART/GREAT VESSELS: Heart is normal in size  Small pericardial effusion is present  Coronary artery calcification and mitral annular calcification is noted  No thoracic aortic aneurysm  MEDIASTINUM AND DIMAS:  Nonpathologically enlarged lymph nodes      CHEST WALL AND LOWER NECK:  Erosive/destructive changes in multiple inferolateral left-sided ribs as well as anterior left 3rd rib most likely reflecting involvement by metastatic urothelial carcinoma  There appears to be infiltration into the left   inferolateral chest wall musculature as well with moderate stranding in the overlying subcutaneous soft tissues  ABDOMEN    LIVER/BILIARY TREE:  Extensive metastatic disease throughout the liver similar to that of the recent PET CT but considerably advanced than compared to the CT 1/19/2023, with greatest extent in the right lobe the liver  Previously identified solitary   right hepatic metastasis in segment 6 are now confluent  Multiple new metastasis are identified with involvement of left and right hepatic lobes  GALLBLADDER:  Gallbladder is surgically absent  SPLEEN:  Unremarkable  PANCREAS:  Unremarkable  ADRENAL GLANDS:  Unremarkable  KIDNEYS/URETERS:  Right percutaneous nephroureteral catheter and left percutaneous nephrostomy tube in appropriate position  No collecting system dilatation on either side  Scattered, incompletely characterized bilateral renal cysts again noted  STOMACH AND BOWEL:  Status post Shaji-en-Y gastric bypass  No bowel wall thickening  No intestinal obstruction  Scattered colonic diverticula  Moderate amount of fecal material throughout the colon  APPENDIX:  A normal appendix was visualized  ABDOMINOPELVIC CAVITY:  Again identified is retroperitoneal adenopathy in the left para-aortic location below the level the renal hilum measuring 1 9 cm short axis, slightly larger than on the study from 1/19/2023, and demonstrating intense increased FDG   uptake on recent PET CT  Again identified are small left common and external iliac chain lymph nodes demonstrating increased FDG avidity  No ascites or pneumoperitoneum noted  VESSELS:  Atherosclerotic changes are present  No evidence of aneurysm  PELVIS    REPRODUCTIVE ORGANS:  Mild prostatomegaly  URINARY BLADDER:  Distal end of the right percutaneous nephroureteral catheter noted in the urinary bladder    There is moderate circumferential bladder wall thickening present with irregular eccentric thickening in the left anterolateral bladder which   may reflect the patient's underlying known urothelial malignancy  ABDOMINAL WALL/INGUINAL REGIONS:  Stranding in the left anterolateral abdominal wall noted  Ventral subcutaneous soft tissue stranding may reflect indication injection sites  OSSEOUS STRUCTURES:  Degenerative change present throughout the spine  Scattered lucent lesions present throughout multiple osseous structures including multiple left-sided ribs, both iliac bones, spine, including the anterior aspect of L2, sacrum, and   sternum  There are also multiple tiny scattered low-attenuation areas which could reflect lytic lesions as well  These are all highly suspicious for osteolytic metastasis  Impression: 1  Interim progression of metastatic disease, likely reflecting the patient's known urothelial carcinoma  In addition to primary site in the urinary bladder, metastatic changes involve the left pleural space, left chest wall, multiple osseous   structures, the liver, and abdominal/pelvic adenopathy as detailed above  2   Additional findings as noted  Workstation performed: [id number]      LABS:  Lab data are reviewed and documented in HemOnc history         Lab Results   Component Value Date    HGB 8 5 (L) 03/09/2023    HCT 26 7 (L) 03/09/2023    MCV 92 03/09/2023     03/09/2023    WBC 18 24 (H) 03/09/2023    NRBC 0 03/03/2023    BANDSPCT 3 02/04/2022    ATYLMPCT 1 (H) 02/04/2022     Lab Results   Component Value Date     09/03/2015    K 4 7 03/10/2023     03/10/2023    CO2 27 03/10/2023    ANIONGAP 5 09/03/2015    BUN 44 (H) 03/10/2023    CREATININE 2 55 (H) 03/10/2023    GLUCOSE 203 (H) 09/03/2015    GLUF 80 01/06/2023    CALCIUM 10 5 (H) 03/10/2023    CORRECTEDCA 11 0 (H) 03/09/2023    AST 14 03/09/2023    ALT 14 03/09/2023    ALKPHOS 114 (H) 03/09/2023    PROT 6 4 07/26/2015 BILITOT 0 50 07/26/2015    EGFR 22 03/10/2023       Lab Results   Component Value Date    IRON 22 (L) 01/07/2023    TIBC 180 (L) 01/07/2023    FERRITIN 217 01/07/2023    FERRITIN 153 11/10/2022    FERRITIN 23 12/10/2021    FERRITIN 40 10/13/2021    FERRITIN 31 06/22/2021    FERRITIN 23 02/17/2021    FERRITIN 52 12/19/2020    FERRITIN 73 09/19/2018       No results for input(s): WBC, CREAT in the last 72 hours      Invalid input(s):  PLT  By:  Gaurang Watson, 3/21/2023, 1:42 PM

## 2023-03-17 NOTE — TELEPHONE ENCOUNTER
Received vm from patient grand-daughter  Reviewed and apologized that recommendations were not made as of 3/16-3/17  Patient grand-daughter asking and requesting to have a virtual appointment to review if any further treatment plans are recommended despite prior hospice care decision

## 2023-03-19 PROBLEM — J10.1 INFLUENZA A: Status: RESOLVED | Noted: 2023-01-03 | Resolved: 2023-03-19

## 2023-03-21 ENCOUNTER — TELEMEDICINE (OUTPATIENT)
Dept: HEMATOLOGY ONCOLOGY | Facility: CLINIC | Age: 78
End: 2023-03-21

## 2023-03-21 DIAGNOSIS — C67.3 MALIGNANT NEOPLASM OF ANTERIOR WALL OF URINARY BLADDER (HCC): Primary | ICD-10-CM

## 2023-03-28 ENCOUNTER — TELEPHONE (OUTPATIENT)
Dept: HEMATOLOGY ONCOLOGY | Facility: CLINIC | Age: 78
End: 2023-03-28

## 2023-03-28 NOTE — TELEPHONE ENCOUNTER
"Received vm from patient spouse, \"Hi, Elieceralana SalazarWeldon  My name is Ruben Hernandez  I'm sorry to bother you, but Gorge Espinoza usually take care of all of this for Martha Alejo  But believe it or not, she's sick and I'm having a lot of issues with Kelly Middleton  I need to know how to get him off of Hospice  Please give me a call  My number is [phone], again, [phone]  Thank you  I gave you given the trying to think because I was trying to change  You can't change the \"      Call out to patient spouse Chloe Arreola with patient spouse that hospice would have to be notified and also that Palliative care would be appropriate to notify of request to be off hospice  Patient spouse, Dacia Nazario had stated at this time she does not feel that patient would be strong enough for chemotherapy treatment and would not be able to be transported to the infusion center for any intravenous treatments  Patient spouse had stated that she needs more physical assistance with patient care due to Grand-daughter at work full time and also ill at this time  Patient spouse stated that she receives 1 hour for hospice aide for patient personal hygiene care, but [facility] hours per patient spouse for private aide care  Reviewed that unfortunately, this is something that would have to be addressed with the hospice social worker to assist with more physical patient care assistance  This RN offered to help assist by calling Hospice RN and or , patient spouse declined at this time  Patient spouse stated that patient had made the decision to go hospice during last hospitalization  It was also reviewed that patient is not able to get out of the bed and that patient is in pain  Reviewed that patient hospice nurse should be notified in regards to patient pain  Patient spouse reviewed concern of patient being \"Pumped with medications  \" Reviewed hospice purpose of keeping patient out of pain and comfortable, which may allow more " medications than usual, but hospice should be notified  RN was empathic during telephone call and reviewed that patient spouse can reach out to office with any further question, concerns or to even just communicate

## 2023-03-28 NOTE — TELEPHONE ENCOUNTER
"Received vm from patient family, \"Hi, Esperanza Benavidez, it's Baby Mino calling regarding Dedrick Melida  I know my grandmother spoke with you today, but I just had a couple more questions for you  If you could give me a call, [phone], I'd appreciate it  Thank you  Have a good day  Bye, bye \"    Returned telephone call out to patient grand-daughter  Left vm and reviewed that Dr Jd Curtis is aware of patient and also that I spoke with Po Nj, patient's spouse today 3/28/23  Left vm with Rn teams number and hours of operation to further discuss questions and concerns     "

## 2023-03-29 ENCOUNTER — TELEPHONE (OUTPATIENT)
Dept: HEMATOLOGY ONCOLOGY | Facility: CLINIC | Age: 78
End: 2023-03-29

## 2023-03-29 NOTE — TELEPHONE ENCOUNTER
"\"Demario Pitts  It's actually Tito Coburn calling you back  I did get your message yesterday, but obviously you were finished for the day  I am on my way to a patient  I should be there by 20 of 11  So if you get this and you can call me back before then, that would be great  Otherwise I'll probably be playing phone tag  I probably won't get out of there an hour and a half, maybe 12 if you call me and leave a message  I'll just try again  Have a good one  Bye, bye \"    Returned call out to patient grand-daughter, Jd Gil  She asked if patient would be appropriate for treatment  Patient grand-daughter stated patient is Not walking, not ambulating and not \"moving as much  \"     Patient grand-daughter stated patient is in pain and also is on medications that makes patient lethargic  Patient is eating and drinking  Reviewed purpose of hospice and if patient is not strong enough patient may/may not be a candidate for treatment  Reviewed that is something that Dr Sadia Marino would assess  Patient has an appointment with 4/10/23 in person     "

## 2023-04-02 ENCOUNTER — TELEPHONE (OUTPATIENT)
Dept: OTHER | Facility: OTHER | Age: 78
End: 2023-04-02

## 2023-05-19 NOTE — PATIENT INSTRUCTIONS
Upper Respiratory Infection   AMBULATORY CARE:   An upper respiratory infection  is also called a common cold  It can affect your nose, throat, ears, and sinuses  Common signs and symptoms include the following:  Cold symptoms are usually worst for the first 3 to 5 days  You may have any of the following:  · Runny or stuffy nose    · Sneezing and coughing    · Sore throat or hoarseness    · Red, watery, and sore eyes    · Fatigue     · Chills and fever    · Headache, body aches, or sore muscles  Seek care immediately if:   · You have chest pain or trouble breathing  Contact your healthcare provider if:   · You have a fever over 102ºF (39°C)  · Your sore throat gets worse or you see white or yellow spots in your throat  · Your symptoms get worse after 3 to 5 days or your cold is not better in 14 days  · You have a rash anywhere on your skin  · You have large, tender lumps in your neck  · You have thick, green or yellow drainage from your nose  · You cough up thick yellow, green, or bloody mucus  · You have vomiting for more than 24 hours and cannot keep fluids down  · You have a bad earache  · You have questions or concerns about your condition or care  Treatment for a cold: There is no cure for the common cold  Colds are caused by viruses and do not get better with antibiotics  Most people get better in 7 to 14 days  You may continue to cough for 2 to 3 weeks  The following may help decrease your symptoms:  · Decongestants  help reduce nasal congestion and help you breathe more easily  If you take decongestant pills, they may make you feel restless or not able to sleep  Do not use decongestant sprays for more than a few days  · Cough suppressants  help reduce coughing  Ask your healthcare provider which type of cough medicine is best for you  · NSAIDs , such as ibuprofen, help decrease swelling, pain, and fever   NSAIDs can cause stomach bleeding or kidney problems in certain people  If you take blood thinner medicine, always ask your healthcare provider if NSAIDs are safe for you  Always read the medicine label and follow directions  · Acetaminophen  decreases pain and fever  It is available without a doctor's order  Ask how much to take and how often to take it  Follow directions  Read the labels of all other medicines you are using to see if they also contain acetaminophen, or ask your doctor or pharmacist  Acetaminophen can cause liver damage if not taken correctly  Do not use more than 4 grams (4,000 milligrams) total of acetaminophen in one day  Manage your cold:   · Rest as much as possible  Slowly start to do more each day  · Drink more liquids as directed  Liquids will help thin and loosen mucus so you can cough it up  Liquids will also help prevent dehydration  Liquids that help prevent dehydration include water, fruit juice, and broth  Do not drink liquids that contain caffeine  Caffeine can increase your risk for dehydration  Ask your healthcare provider how much liquid to drink each day  · Soothe a sore throat  Gargle with warm salt water  This helps your sore throat feel better  Make salt water by dissolving ¼ teaspoon salt in 1 cup warm water  You may also suck on hard candy or throat lozenges  You may use a sore throat spray  · Use a humidifier or vaporizer  Use a cool mist humidifier or a vaporizer to increase air moisture in your home  This may make it easier for you to breathe and help decrease your cough  · Use saline nasal drops as directed  These help relieve congestion  · Apply petroleum-based jelly around the outside of your nostrils  This can decrease irritation from blowing your nose  · Do not smoke  Nicotine and other chemicals in cigarettes and cigars can make your symptoms worse  They can also cause infections such as bronchitis or pneumonia   Ask your healthcare provider for information if you currently smoke and need help to quit  E-cigarettes or smokeless tobacco still contain nicotine  Talk to your healthcare provider before you use these products  Prevent spreading your cold to others:   · Try to stay away from other people during the first 2 to 3 days of your cold when it is more easily spread  · Do not share food or drinks  · Do not share hand towels with household members  · Wash your hands often, especially after you blow your nose  Turn away from other people and cover your mouth and nose with a tissue when you sneeze or cough  Follow up with your healthcare provider as directed:  Write down your questions so you remember to ask them during your visits  © 2017 [address]  Information is for End User's use only and may not be sold, redistributed or otherwise used for commercial purposes  All illustrations and images included in CareNotes® are the copyrighted property of [employer]  or [employer]  The above information is an  only  It is not intended as medical advice for individual conditions or treatments  Talk to your doctor, nurse or pharmacist before following any medical regimen to see if it is safe and effective for you  Take your antibiotic her Tensilon Perles and you Flonase nasal spray let us know if you not feeling belly call us next week with a progress report  Family

## 2023-07-29 NOTE — TELEPHONE ENCOUNTER
Dr Kathe Steele- Would you be okay with attempting to use B&O suppository to manage symptoms? 29-Jul-2023 19:40

## 2023-08-05 NOTE — PLAN OF CARE
Problem: Potential for Falls  Goal: Patient will remain free of falls  Description: INTERVENTIONS:  - Educate patient/family on patient safety including physical limitations  - Instruct patient to call for assistance with activity   - Consult OT/PT to assist with strengthening/mobility   - Keep Call bell within reach  - Keep bed low and locked with side rails adjusted as appropriate  - Keep care items and personal belongings within reach  - Initiate and maintain comfort rounds  - Make Fall Risk Sign visible to staff  - Apply yellow socks and bracelet for high fall risk patients  - Consider moving patient to room near nurses station  Outcome: Progressing
7

## 2023-08-06 NOTE — PLAN OF CARE
Problem: PHYSICAL THERAPY ADULT  Goal: Performs mobility at highest level of function for planned discharge setting  See evaluation for individualized goals  Description: Treatment/Interventions: Functional transfer training, LE strengthening/ROM, Therapeutic exercise, Endurance training, Patient/family training, Equipment eval/education, Bed mobility, Gait training, Spoke to nursing, OT  Equipment Recommended: Sirena Acosta, Wheelchair (pt owns walker and wc at home PTA)       See flowsheet documentation for full assessment, interventions and recommendations  Note: Prognosis: Guarded  Problem List: Decreased strength, Decreased endurance, Impaired balance, Decreased mobility, Decreased coordination, Decreased skin integrity, Pain  Assessment: Pt is a [de-identified] yo male admitted to [facility] 2* sepsis, UTI, c/o dysuria, bladder CA, B hydronephrosis, stage IV CKD, CA related pain, chronic pleural effusion  Barriers to Discharge: Inaccessible home environment ((+)[place])     PT Discharge Recommendation: Post acute rehabilitation services (rehab vs HHPT PENDING mobility progress and available A from family)    See flowsheet documentation for full assessment  No

## 2023-08-10 NOTE — PROGRESS NOTES
Progress Note - Jaskaran Valencia 1945, 66 y o  male MRN: [id number]    Unit/Bed#: E5 -01 Encounter: [id number]    Primary Care Provider: Eren Lopez MD   Date and time admitted to hospital: 6/29/2020  1:59 PM        * Cystitis due to intravesical BCG administration  Assessment & Plan  · Observe off antibiotics since this is due to BCG instillation itself and not due to acute infection  · Supportive care    Discitis of lumbar region  Assessment & Plan  · CT was abnormal suggesting diskitis  · Follow-up MRI confirmed diskitis and beginning osteomyelitis of L1-L2  · Postop day 2 Status post image guided aspiration by IR  · Continuep r n  Oxycodone for Pain control  · Serial exam  · Off antibiotics  · Cultures negative  · Spoke with Micro and added cytology  However the remaining sample they have is small  Could not add pathology study for the fluid remaining  Chronic kidney disease, stage 4 (severe) ([facility])  Assessment & Plan  Stable and at baseline  Monitor closely    Malignant tumor of urinary bladder [facility])  Assessment & Plan  Ongoing treatments through [facility] had a BCG treatment last Tuesday 6/23/19  Essential hypertension  Assessment & Plan  Continue Toprol  mg daily daily with hold parameters      VTE Pharmacologic Prophylaxis:   Pharmacologic: Heparin  Mechanical VTE Prophylaxis in Place: No    Education and Discussions with Family / Patient: Left vm for wife    Time Spent for Care: 25 minutes  More than 50% of total time spent on counseling and coordination of care as described above  Current Length of Stay: 5 day(s)    Current Patient Status: Inpatient   Certification Statement: The patient will continue to require additional inpatient hospital stay due to Diskitis and early osteomyelitis of L1-L2    Discharge Plan: To be determined    Code Status: Level 1 - Full Code      Subjective:    Intermittent low back pain  Intermittent dysuria  No fever or chills  No weakness or numbness of the lower extremity    Objective:     Vitals:   Temp (24hrs), Av 2 °F (36 8 °C), Min:97 2 °F (36 2 °C), Max:98 8 °F (37 1 °C)    Temp:  [97 2 °F (36 2 °C)-98 8 °F (37 1 °C)] 98 6 °F (37 °C)  HR:  [59-65] 60  Resp:  [18] 18  BP: (117-167)/(75-88) 141/76  SpO2:  [98 %-99 %] 98 %  Body mass index is 29 06 kg/m²  Input and Output Summary (last 24 hours): Intake/Output Summary (Last 24 hours) at 2020 1502  Last data filed at 7/3/2020 1647  Gross per 24 hour   Intake 440 ml   Output --   Net 440 ml       Physical Exam:     Physical Exam   Constitutional: He is oriented to person, place, and time  He appears well-developed  No distress  HENT:   Head: Normocephalic and atraumatic  Eyes: No scleral icterus  Neck: No JVD present  Cardiovascular: Regular rhythm  Exam reveals no gallop and no friction rub  No murmur heard  Pulmonary/Chest: Effort normal  No stridor  No respiratory distress  He has no wheezes  Abdominal: Soft  He exhibits no distension  There is no tenderness  There is no guarding  Musculoskeletal: He exhibits no edema or deformity  Neurological: He is alert and oriented to person, place, and time  Skin: Skin is warm and dry  He is not diaphoretic  Psychiatric: He has a normal mood and affect  His behavior is normal    Vitals reviewed      Additional Data:     Labs:    Results from last 7 days   Lab Units 20  0434   WBC Thousand/uL 7 78   HEMOGLOBIN g/dL 10 4*   HEMATOCRIT % 31 1*   PLATELETS Thousands/uL 173   NEUTROS PCT % 61   LYMPHS PCT % 26   MONOS PCT % 9   EOS PCT % 2     Results from last 7 days   Lab Units 20  0434   SODIUM mmol/L 137   POTASSIUM mmol/L 4 3   CHLORIDE mmol/L 105   CO2 mmol/L 21   BUN mg/dL 44*   CREATININE mg/dL 2 33*   ANION GAP mmol/L 11   CALCIUM mg/dL 8 6   ALBUMIN g/dL 3 2*   TOTAL BILIRUBIN mg/dL 0 30   ALK PHOS U/L 84   ALT U/L 67   AST U/L 38   GLUCOSE RANDOM mg/dL 116         Results from last 7 days   Lab Units 07/04/20  1124 07/04/20  0725 07/03/20 2055 07/03/20  1552 07/03/20  1109 07/03/20  0731 07/02/20  2052 07/02/20  1758 07/02/20  1716 07/02/20  1608 07/02/20  1533 07/02/20  1115   POC GLUCOSE mg/dl 84 97 206* 65 116 102 91 270* 390* 431* 402* 191*                   * I Have Reviewed All Lab Data Listed Above  * Additional Pertinent Lab Tests Reviewed: All Labs Within Last 24 Hours Reviewed    Imaging:    Imaging Reports Reviewed Today Include:  None    Recent Cultures (last 7 days):     Results from last 7 days   Lab Units 07/02/20  1004 06/30/20  1805 06/29/20  1917 06/29/20  1900 06/29/20  1507   BLOOD CULTURE   --   --  No Growth After 4 Days  No Growth After 4 Days    --    GRAM STAIN RESULT  Rare Polys  No bacteria seen  --   --   --   --    URINE CULTURE   --   --   --   --  No Growth <1000 cfu/mL   BODY FLUID CULTURE, STERILE  No growth  --   --   --   --    C DIFF TOXIN B   --  Negative  --   --   --        Last 24 Hours Medication List:     Current Facility-Administered Medications:  acetaminophen 650 mg Oral Q6H PRN Lynda Ang PA-C   ALPRAZolam 0 25 mg Oral BID PRN Janee Kocher, PA-C   aspirin 81 mg Oral Daily Lynda Ang PA-C   bimatoprost 1 drop Both Eyes HS Lynda Ang PA-C   docusate sodium 100 mg Oral Daily Leeanna Oliveira MD   doxylamine 25 mg Oral HS PRN Janee Kocher, PA-C   gabapentin 600 mg Oral Daily Lynda Ang PA-C   heparin (porcine) 5,000 Units Subcutaneous Q8H [facility] Lynda Ang PA-C   insulin glargine 30 Units Subcutaneous HS Lynda Ang PA-C   insulin lispro 1-6 Units Subcutaneous TID AC Lynda Ang PA-C   insulin lispro 1-6 Units Subcutaneous HS Lynda Ang PA-C   insulin lispro 10 Units Subcutaneous TID With Meals Janee Kocher, PA-C   isosorbide mononitrate 90 mg Oral Daily Lynda Ang PA-C   metoprolol succinate 100 mg Oral Daily Lynda Ang PA-C   ondansetron 4 mg Intravenous Q6H PRN Janee Kocher, PA-C   oxyCODONE 5 mg Oral Q6H PRN Leeanna Oliveira MD pantoprazole 40 mg Oral Early Morning Lynda Ang PA-C   sertraline 50 mg Oral Daily Akbar Ross PA-C        Today, Patient Was Seen By: Dannie Estes MD    ** Please Note: Dictation voice to text software may have been used in the creation of this document   ** 10 (severe pain)

## 2023-08-14 NOTE — UTILIZATION REVIEW
Continued Stay Review    Date: 2/4/2019      Vital Signs: /84 (BP Location: Left arm)   Pulse 64   Temp 98 4 °F (36 9 °C) (Oral)   Resp 18   Ht 6' 4" (1 93 m)   Wt 108 kg (238 lb 1 6 oz)   SpO2 98%   BMI 28 98 kg/m²      Assessment/Plan: 75 yo m with back pain -  S/p injection -  But pain has continued - pt was able to stand  But is uncomfortable -  - PT walked  But needed assistance for stability -  Neurosurgery following - cardiology for surgical clearance -   Pt on decadron -  Insulin dose increased -  insulin gtt  From 2/2 thru 2/4 - d/c'd 11:33 am  -  Possible need for surgery -  Hx HTN  - COPD - CAD - Bladder CA       Medications:   Scheduled Meds:   Current Facility-Administered Medications:  azelastine 1 spray Each Nare BID   bimatoprost 1 drop Both Eyes HS   fluocinonide  Topical BID   fluticasone 1 spray Nasal Daily   gabapentin 300 mg Oral BID   insulin aspart 10 Units Subcutaneous TID With Meals   insulin lispro 15 Units Subcutaneous Once   isosorbide mononitrate 30 mg Oral Daily   metoprolol succinate 100 mg Oral Daily   mirtazapine 15 mg Oral HS   morphine injection 2 mg Intravenous Q4H PRN   mupirocin  Nasal Q12H [address]   pantoprazole 40 mg Oral Early Morning         Pertinent Labs/Diagnostic Results: MRi lumbar spine: 1   Severe chronic disc and facet degenerative change throughout the lumbar spine, most prominent at L4-5 resulting in significant encroachment of the exiting L4 nerves and traversing L5 nerve roots      Age/Sex: 74 y o  male     Discharge Plan: Uncontrolled, continue current medications, continue current treatment plan and lifestyle modifications recommended continue current medications as A1c is improving down to 7.7. We will continue insulin injections 60 units of Basaglar in the morning 20 units at bedtime, continue continuous glucose monitoring. Denies any hypoglycemic events. Denies blood sugar less than 120, statesBlood sugar averages around 160. Continue NovoLog sliding scale before meals and bedtime. See scale for dosing. .  Will increase Trulicity to 3 mg subcutaneous injections weekly. Denies any difficulties with this does state certain medications make him bloated and have stomach cramps.

## 2023-09-24 NOTE — ASSESSMENT & PLAN NOTE
PREOPERATIVE DIAGNOSIS:  Papilledema with severe vision loss, right eye.      POSTOPERATIVE DIAGNOSIS:  Papilledema with severe vision loss, right eye.      PROCEDURE PERFORMED:  Optic nerve sheath fenestration, right eye      SURGEON:  Christiano Antoine MD     ASSISTANTS: Marine Boogie MD and Cj Ashton MD    ANESTHESIA:  General with local infiltration of a 50/50 mixture of 2% lidocaine with epinephrine and 0.5% Marcaine.      COMPLICATIONS:  None.      ESTIMATED BLOOD LOSS:  Less than 5 mL.      HISTORY:  Khari Castaneda  presented with severe vision loss due to papilledema from  pseudotumor cerebri.  After the risks, benefits and alternatives to the proposed procedure were explained to the patient, informed consent was obtained.      PROCEDURE:  The patient was brought to the operating room and placed supine on the operating table.  General anesthesia was induced.  The right upper lid crease was marked with a marking pen and infiltrated with local anesthetic.  The area was prepped and draped in the typical sterile ophthalmic fashion.  Attention was directed to the right side.  Lid crease incision was made with a 15 blade and dissection carried down to the orbicularis with high temperature cautery.  Orbital septum was opened horizontally.  Dissection was carried into the nasal fat compartment and into the intraconal space using blunt dissection.  The optic nerve was identified. Malleable retractors over cottonoids were used to isolate the optic nerve. A myringotomy forcep was used to separate the optic nerve sheath from the nerve and a small incision of sheath was made with Yassargil neurosurgical scissors. A small hook was used to dilate the incisions and release arachnoid granulations to allow flow into the orbit.  A nice gush of fluid was seen on incision in the nerve sheath.  There was minimal bleeding and the skin was closed with running 6-0 plain gut suture.  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  The patient was taken to recovery room in stable condition and readmitted onto the floor.         Cj Ashton MD  PGY-3 Ophthalmology    Dr. Antoine was present for the entire procedure.

## 2023-12-01 NOTE — ASSESSMENT & PLAN NOTE
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

## 2024-01-01 NOTE — PROGRESS NOTES
NEPHROLOGY PROGRESS NOTE   Roxi Ramos 66 y o  male MRN: [id number]  Unit/Bed#: E5 -01 Encounter: [id number]      ASSESSMENT/PLAN:  Chronic kidney disease, stage IV:  - etiology suspect secondary to diabetic kidney disease, hypertensive nephrosclerosis, underlying renal vascular disease, age-related nephron loss  - upon review of medical records, baseline creatinine 2 2-2 5 mg/dL  - most recent creatinine prior to admission on 02/04 was 2 45 mg/dL  - most recent creatinine today 2 03 mg/dL              - FTYXUD post cystoscopy, evacuation of clots, fulguration on 2/10               - patient is at risk for acute kidney injury secondary to underlying comorbidities after exposure to contrast on 2/8 secondary to angiogram and with repeat intervention planned in IR on 2/11                - received isolyte 6 hours pre and 6 hours post procedure + Mucomyst 1200 mg b i d  x 4 doses  - IVF hydration still infusing, will discontinue    - proteinuria: Most recent protein creatinine ratio 530 mg as of December 2020, suspect secondary to diabetic kidney disease    - avoid NSAIDs, nephrotoxic agents, IV contrast  - adjust medications to appropriate GFR  - monitor volume status with strict intake/output, daily weight  - will check BMP in a m      Electrolytes, acid/base:  - electrolytes overall stable and acceptable with most recent sodium 137 and potassium of 4 5   - most recent bicarbonate 26    - will continue to monitor with repeat lab studies      Blood pressure, hypertension:  - blood pressure stable, elevations noted  - currently on: Imdur 30 mg daily, Toprol- mg daily  - recommendations: No changes for now    - optimize hemodynamics; avoid hypotension and fluctuations of blood pressure   - maintain MAP > 65       H/H, anemia:  - most recent hemoglobin 9 7 grams/deciliter   - goal hemoglobin greater than 8 grams/deciliter   - recommend PRBC transfusion for hemoglobin less than 7       Other medical problems:  - left lower extremity wound:              - status post angiogram on 02/08 with angioplasty of left PT, will require IR intervention for retrograde access  Post L PT atherectomy with angioplasty on 2/11                - plan per vascular surgery      - diabetes, most recent hemoglobin A1c 7, on insulin per primary team      - hematuria:              - status post cystoscopy and evacuation fulguration on 02/10 revealed mild prosthetic urethral bleeding   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   Areas of bleeding in the prostatic urethra were fulgurated as well as areas of erythema in the posterior wall of the bladder   Small amount of clot removed               - currently on slow CBI              - plan per Urology  SUBJECTIVE:  Patient resting in bed  Patient with left lower extremity discomfort    Patient is able to tolerate breakfast     OBJECTIVE:  Current Weight: Weight - Scale: 106 kg (234 lb 5 6 oz)  Vitals:    02/12/21 0735   BP: 151/79   Pulse: 64   Resp: 18   Temp: 97 6 °F (36 4 °C)   SpO2: 97%       Intake/Output Summary (Last 24 hours) at 2/12/2021 1111  Last data filed at 2/12/2021 0900  Gross per 24 hour   Intake 240 ml   Output 2100 ml   Net -1860 ml       General:  No acute distress  Skin:  Warm, no rash  Eyes:  Sclerae anicteric  ENT:  Moist lips mucous membranes  Neck:  Supple trachea midline  Chest:  Diminished breath sounds bilaterally   CVS:  Regular rate regular rhythm  Abdomen:  Soft, nontender, normoactive bowel sounds  Extremities:  No parker edema, left lower extremity wrapped   Neuro:  Awake and interactive  Psych:  Flat affect      Medications:  Scheduled Meds:  Current Facility-Administered Medications   Medication Dose Route Frequency Provider Last Rate    acetaminophen  650 mg Oral Q6H PRN Bess Law MD      ALPRAZolam  0 25 mg Oral BID PRN Bess Law MD      aspirin  81 mg Oral Daily Bess Law MD      bismuth subsalicylate  922 mg Oral Q6H PRN MD Jimmy Johnston cefazolin  2,000 mg Intravenous Q8H Estee Erickson MD 2,000 mg (02/12/21 0546)    docusate sodium  100 mg Oral BID Estee Erickson MD      fluticasone  1 spray Each Nare BID Estee Erickson MD      gabapentin  600 mg Oral Daily Estee Erickson MD      heparin (porcine)  5,000 Units Subcutaneous [facility] Estee Erickson MD      insulin glargine  10 Units Subcutaneous HS Estee Ericksno MD      insulin lispro  1-6 Units Subcutaneous TID [facility] Estee Erickson MD      isosorbide mononitrate  30 mg Oral Daily Estee Erickson MD      latanoprost  1 drop Both Eyes HS Estee Erickson MD      metoprolol succinate  100 mg Oral Daily Estee Erickson MD      multi-electrolyte  75 mL/hr Intravenous Continuous EVANGELINA Argueta 75 mL/hr (02/11/21 1814)    oxyCODONE  5 mg Oral Q4H PRN Estee Erickson MD      pantoprazole  40 mg Oral Early Morning Estee Erickson MD      polyethylene glycol  17 g Oral Daily Alia Rodriguez DO      sertraline  50 mg Oral Daily Estee Erickson MD      tamsulosin  0 4 mg Oral HS Estee Erickson MD      zolpidem  5 mg Oral HS PRN Estee Erickson MD         PRN Meds:   acetaminophen    ALPRAZolam    bismuth subsalicylate    oxyCODONE    zolpidem    Continuous Infusions:multi-electrolyte, 75 mL/hr, Last Rate: 75 mL/hr (02/11/21 1814)        Laboratory Results:  Results from last 7 days   Lab Units 02/12/21  0449 02/11/21  0508 02/10/21  0531 02/09/21  0806 02/09/21  0458 02/08/21  1616 02/08/21  0611 02/07/21  1458   WBC Thousand/uL  --  9 84 7 24 7 78  --   --  6 74  --    HEMOGLOBIN g/dL  --  9 7* 9 1* 9 5*  --  9 2* 10 4*  --    HEMATOCRIT %  --  29 2* 27 3* 28 4*  --  28 0* 31 4*  --    PLATELETS Thousands/uL  --  180 157 154  --   --  164 203   SODIUM mmol/L 137 136 139  --  138 138 139  --    POTASSIUM mmol/L 4 5 4 6 4 3  --  4 5 5 4* 4 5  --    CHLORIDE mmol/L 104 104 107  --  106 106 106  --    CO2 mmol/L 26 23 23  --  23 26 24  --    BUN mg/dL 29* 30* 32*  --  33* 35* 38*  --    CREATININE mg/dL 2 03* 2 14* 2 05*  -- 1 96* 2 02* 2 03*  --    CALCIUM mg/dL 8 4 8 4 8 4  --  7 9* 7 9* 8 3  --    MAGNESIUM mg/dL 1 9 1 9  --   --   --   --   --   --    PHOSPHORUS mg/dL 3 7 3 6  --   --   --   --   --   -- N/A

## 2024-01-01 NOTE — ASSESSMENT & PLAN NOTE
· History of hematuria due to bladder cancer  · Hg 7 1 prior to transfer   Received 1 unit PRBC  · Iron panel showing continued deficiency  · Seen in consult by hematology oncology    Hemoglobin 8 2 > 8 1 > 8 0 > 7 9 > 8 >7 7>8 8>9 1>8 3>7 7    Hemoglobin slowly dropping in the setting of hematuria; consider transfusion prior to discharge  Monitor on morning labs Karan

## 2024-02-07 NOTE — ASSESSMENT & PLAN NOTE
Continues to be orthostatic  Will encourage p o  hydration  Monitor closely  Resume metoprolol 25 mg twice a day  Apply compression stockings  Consult cardiology 10

## 2024-05-21 NOTE — PROGRESS NOTES
Caller states that she just received a faxed order regarding the patient's INR. Caller states that she can't not use the order due to not having Dr. Oneill's signature. Caller would like to have the INR order resent with the dr's signature.   Pt arrives for CBC from port today  Upon investigation of not having lab order, it was used yesterday per Dr Dmitri Nair needing a cbc regarding patient's bleeding  Patient has dark red blood in urine bag and states he is bleeding from his penis  Patient also states he is very weak  Hemoglobin yesterday 7 9  Vitals stable  Pt states he drove here  Spoke with [facility] at Dr Chayo Mann office, she will reach out to Dr Dmitri Nair on next steps if pt needs to go to ER  Also spoke with Kim Islas, RN with Dr Princess Dennis who is now aware of the situation as well, questioning holding patient's chemo tomorrow  Per [facility], pt to go home and wait for phone call regarding next steps from Dr Dmitri Nair  Patient aware and states he is stable to drive

## 2024-06-06 NOTE — PROGRESS NOTES
Assessment/Plan:  Venous duplex rule out DVT check a D-dimer lab come back in the March appointment already scheduled    Herpes zoster without complication  Has vesicles started clearing up on the left side of the neck  Not a spread to the face or eye  Has some discomfort  He has some lesions on the right arm which I think more like bug bites  I do not think is the shingles unusual to have a bilaterally is finishing up his acyclovir which is 400 mg 2 tablets 5 times a day will see him back in 2 weeks for follow-up    Will order triamcinolone ointment to the right arm  Localized edema  Edema localized on the left leg and ankle since he has had an episode of not moving around with the back pain was on a wheelchair was hospitalized a couple of weeks ago his at risk for DVT will check a venous duplex stat  Will also add a D-dimer I told him to go for his electrolytes    CKD (chronic kidney disease) stage 3, GFR 30-59 ml/min  Have him go for his basic metabolic panel today  Diagnoses and all orders for this visit:    Degeneration of lumbar intervertebral disc    Coronary artery disease involving native coronary artery of native heart without angina pectoris    Uncontrolled type 2 diabetes mellitus with hyperglycemia (HCC)    CKD (chronic kidney disease) stage 3, GFR 30-59 ml/min (HCC)    Eczema, unspecified type  -     triamcinolone (KENALOG) 0 5 % ointment; Apply topically 2 (two) times a day    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, with long-term current use of insulin ([facility])    Arteriosclerosis of artery of extremity ([facility])    Stable angina pectoris ([facility])    Localized edema  -     Cancel: VAS lower limb venous duplex study, complete bilateral; Future  -     D-dimer, quantitative; Future  -     VAS lower limb venous duplex study, complete bilateral; Future          Subjective:      Patient ID: Jermaine Smalls is a 66 y o  male      Chief Complaint   Patient presents with    Follow-up     ER follow Jay Dalton is a 51 year old male here for  Chief Complaint   Patient presents with    Follow-up     Hepatocellular carcinoma (CMD)     Denies latex allergy or sensitivity.    Medication verified, no changes.  PCP and Pharmacy verified.    Social History     Tobacco Use   Smoking Status Former    Current packs/day: 0.00    Types: Cigarettes    Start date: 2019    Quit date: 3/1/2020    Years since quittin.2   Smokeless Tobacco Never     Advance Directives Filed: Yes    ECOG:   ECOG [24 1430]   ECOG Performance Status 3       Vitals:    Visit Vitals  /60   Pulse 94   Temp 98.7 °F (37.1 °C) (Oral)   Resp 16   SpO2 96%       These vital signs are:  Within defined parameters (Per Reference \"Defined Limits Hospital Outpatient Department (HOD)\")    Height: No.  Ht Readings from Last 1 Encounters:   05/10/24 6' 2\" (1.88 m)     Weight:No.  Wt Readings from Last 3 Encounters:   24 97.1 kg (214 lb)   24 99.8 kg (220 lb)   24 99.8 kg (220 lb)       BMI: There is no height or weight on file to calculate BMI.    REVIEW OF SYSTEMS  GENERAL:  Patient denies headache, fevers, chills, night sweats, excessive fatigue, change in appetite, weight loss, dizziness  ALLERGIC/IMMUNOLOGIC: Verified allergies: Yes  EYES:  Patient denies significant visual difficulties, double vision, blurred vision  ENT/MOUTH: Patient denies problems with hearing, sore throat, sinus drainage, mouth sores  ENDOCRINE:  Patient denies diabetes, thyroid disease, hormone replacement, hot flashes  HEMATOLOGIC/LYMPHATIC: Patient denies easy bruising, bleeding, tender lymph nodes, swollen lymph nodes  BREASTS: Patient denies abnormal masses of breast, nipple discharge, pain  RESPIRATORY:  Patient denies lung pain with breathing, cough, coughing up blood, shortness of breath  CARDIOVASCULAR:  Patient denies anginal chest pain, palpitations, shortness of breath when lying flat, peripheral edema  GASTROINTESTINAL: Patient  denies abdominal pain , nausea, vomiting, diarrhea, GI bleeding, constipation, change in bowel habits, heartburn, sensation of feeling full, difficulty swallowing  : Patient denies blood in the urine, burning with urination, frequency, urgency, hesitancy, incontinence  MUSCULOSKELETAL:  Patient denies bone pain, joint swelling, redness, decreased range of motion, but complains of: joint pain, pain ratin, location: ankles  SKIN:  Patient denies chronic rashes, inflammation, ulcerations, skin changes, itching  NEUROLOGIC:  Patient denies loss of balance, areas of focal weakness, abnormal gait, sensory problems, numbness, tingling  PSYCHIATRIC: Patient denies insomnia, depression, anxiety    This patient reported abnormal symptoms that needed immediate verbal communication: No     up for shingles         Current Outpatient Medications:     acyclovir (ZOVIRAX) 400 MG tablet, Take 2 tablets (800 mg total) by mouth 5 (five) times a day for 7 days, Disp: 70 tablet, Rfl: 0    aspirin 81 MG tablet, Take 81 mg by mouth daily  , Disp: , Rfl:     Azelastine HCl 0 15 % SOLN, Inhale 1 spray 2 (two) times a day, Disp: , Rfl: 0    bimatoprost (LUMIGAN) 0 01 % ophthalmic drops, 1 drop daily at bedtime  , Disp: , Rfl:     bismuth subsalicylate (PEPTO BISMOL) 262 MG chewable tablet, Chew 524 mg daily as needed for indigestion  , Disp: , Rfl:     Blood Glucose Monitoring Suppl (ACCU-CHEK RITA PLUS) w/Device KIT, USE AS DIRECTED  *PA*, Disp: 1 kit, Rfl: 0    Cholecalciferol 04703 units capsule, Take 50,000 Units by mouth every 30 (thirty) days, Disp: , Rfl:     fluocinonide (LIDEX) 0 05 % cream, Apply topically 2 (two) times a day , Disp: , Rfl:     fluticasone (FLONASE) 50 mcg/act nasal spray, 1 spray into each nostril daily, Disp: 16 g, Rfl: 3    furosemide (LASIX) 20 mg tablet, TAKE 1 TABLET DAILY, Disp: 90 tablet, Rfl: 1    gabapentin (NEURONTIN) 300 mg capsule, Take 2 capsules at bedtime, Disp: 180 capsule, Rfl: 1    Insulin Pen Needle 31G X 8 MM MISC, BD Ultra-Fine Short Pen Needle 31 gauge x 5/16", Disp: , Rfl:     LANTUS SOLOSTAR 100 units/mL injection pen, 45 units bedtime, Disp: 5 pen, Rfl: 0    loperamide (IMODIUM) 2 mg capsule, Take by mouth, Disp: , Rfl:     metFORMIN (GLUCOPHAGE) 1000 MG tablet, Take 1,000 mg by mouth 2 (two) times a day with meals Take 1/2 tablet in the morning, Disp: , Rfl:     metoprolol succinate (TOPROL-XL) 100 mg 24 hr tablet, Take 100 mg by mouth daily  , Disp: , Rfl:     Mirabegron ER (MYRBETRIQ) 50 MG TB24, Take by mouth daily, Disp: , Rfl:     multivitamin (THERAGRAN) TABS, Take 1 tablet by mouth daily  , Disp: , Rfl:     naproxen (NAPROSYN) 500 mg tablet, Take 1 tablet (500 mg total) by mouth 2 (two) times a day with meals, Disp: 30 tablet, Rfl: 0   NOVOLOG FLEXPEN 100 units/mL injection pen, Inject 15 Units under the skin 3 (three) times a day with meals, Disp: 5 pen, Rfl: 3    omeprazole (PriLOSEC) 20 mg delayed release capsule, TAKE 1 CAPSULE DAILY, Disp: 90 capsule, Rfl: 1    isosorbide mononitrate (IMDUR) 30 mg 24 hr tablet, Take 1 tablet (30 mg total) by mouth daily for 90 days, Disp: 90 tablet, Rfl: 1    triamcinolone (KENALOG) 0 5 % ointment, Apply topically 2 (two) times a day, Disp: 30 g, Rfl: 0    Patient came in as a follow-up from the ER he had the shingles  Of is treated appropriately with the acyclovir he did have the shingles vaccine  I told him to wait a couple years he could have the shingles vaccine to prevent another attack has some pain no eye involvement no fever chills finishing up acyclovir continue the same  He has swelling on the left leg concerned about a DVT recent bouts of back pain seen neurosurgeon and your is a [address] [facility] recommended adequate therapy he is ambulating with a walker told him to wait till the 1st of March for the adequate therapy until the shingle lesions have resolved or crusted  Blood pressure control    Back pain continue follow-up with neurosurgeons      The following portions of the patient's history were reviewed and updated as appropriate: allergies, current medications, past family history, past medical history, past social history, past surgical history and problem list     Review of Systems   Constitutional: Negative  Negative for activity change, appetite change, fatigue, fever and unexpected weight change  HENT: Negative for congestion, ear pain, hearing loss, mouth sores, postnasal drip, rhinorrhea, sore throat, trouble swallowing and voice change  Eyes: Negative for pain, redness and visual disturbance  Respiratory: Negative for cough, chest tightness, shortness of breath and wheezing  Cardiovascular: Positive for leg swelling  Negative for chest pain and palpitations  Gastrointestinal: Negative for abdominal distention, abdominal pain, blood in stool, constipation, diarrhea and nausea  Endocrine: Negative for cold intolerance, heat intolerance, polydipsia, polyphagia and polyuria  Genitourinary: Negative for difficulty urinating, dysuria, flank pain, frequency, hematuria and urgency  Musculoskeletal: Negative for arthralgias, back pain, gait problem, joint swelling and myalgias  Skin: Positive for rash  Negative for color change and pallor  Neurological: Negative for dizziness, tremors, seizures, syncope, weakness, numbness and headaches  Hematological: Negative for adenopathy  Does not bruise/bleed easily  Psychiatric/Behavioral: Negative  Negative for sleep disturbance  The patient is not nervous/anxious            Objective:    Results for orders placed or performed during the hospital encounter of 01/31/19   Comprehensive metabolic panel   Result Value Ref Range    Sodium 136 136 - 145 mmol/L    Potassium 4 1 3 5 - 5 3 mmol/L    Chloride 102 100 - 108 mmol/L    CO2 26 21 - 32 mmol/L    ANION GAP 8 4 - 13 mmol/L    BUN 30 (H) 5 - 25 mg/dL    Creatinine 1 62 (H) 0 60 - 1 30 mg/dL    Glucose 328 (H) 65 - 140 mg/dL    Calcium 8 7 8 3 - 10 1 mg/dL    AST 42 5 - 45 U/L    ALT 86 (H) 12 - 78 U/L    Alkaline Phosphatase 81 46 - 116 U/L    Total Protein 6 6 6 4 - 8 2 g/dL    Albumin 3 3 (L) 3 5 - 5 0 g/dL    Total Bilirubin 0 53 0 20 - 1 00 mg/dL    eGFR 47 ml/min/1 73sq m   CBC and differential   Result Value Ref Range    WBC 9 14 4 31 - 10 16 Thousand/uL    RBC 3 74 (L) 3 88 - 5 62 Million/uL    Hemoglobin 11 9 (L) 12 0 - 17 0 g/dL    Hematocrit 34 3 (L) 36 5 - 49 3 %    MCV 92 82 - 98 fL    MCH 31 8 26 8 - 34 3 pg    MCHC 34 7 31 4 - 37 4 g/dL    RDW 12 8 11 6 - 15 1 %    MPV 11 1 8 9 - 12 7 fL    Platelets 300 130 - 141 Thousands/uL    nRBC 0 /100 WBCs    Neutrophils Relative 70 43 - 75 %    Immat GRANS % 1 0 - 2 %    Lymphocytes Relative 22 14 - 44 %    Monocytes Relative 7 4 - 12 %    Eosinophils Relative 0 0 - 6 %    Basophils Relative 0 0 - 1 %    Neutrophils Absolute 6 43 1 85 - 7 62 Thousands/µL    Immature Grans Absolute 0 05 0 00 - 0 20 Thousand/uL    Lymphocytes Absolute 1 99 0 60 - 4 47 Thousands/µL    Monocytes Absolute 0 64 0 17 - 1 22 Thousand/µL    Eosinophils Absolute 0 02 0 00 - 0 61 Thousand/µL    Basophils Absolute 0 01 0 00 - 0 10 Thousands/µL   Comprehensive metabolic panel   Result Value Ref Range    Sodium 136 136 - 145 mmol/L    Potassium 4 1 3 5 - 5 3 mmol/L    Chloride 103 100 - 108 mmol/L    CO2 26 21 - 32 mmol/L    ANION GAP 7 4 - 13 mmol/L    BUN 33 (H) 5 - 25 mg/dL    Creatinine 1 59 (H) 0 60 - 1 30 mg/dL    Glucose 336 (H) 65 - 140 mg/dL    Calcium 8 7 8 3 - 10 1 mg/dL    AST 44 5 - 45 U/L    ALT 85 (H) 12 - 78 U/L    Alkaline Phosphatase 83 46 - 116 U/L    Total Protein 6 6 6 4 - 8 2 g/dL    Albumin 3 4 (L) 3 5 - 5 0 g/dL    Total Bilirubin 0 53 0 20 - 1 00 mg/dL    eGFR 48 ml/min/1 73sq m   Magnesium   Result Value Ref Range    Magnesium 2 1 1 6 - 2 6 mg/dL   Phosphorus   Result Value Ref Range    Phosphorus 3 1 2 3 - 4 1 mg/dL   CBC (With Platelets)   Result Value Ref Range    WBC 9 14 4 31 - 10 16 Thousand/uL    RBC 3 74 (L) 3 88 - 5 62 Million/uL    Hemoglobin 11 9 (L) 12 0 - 17 0 g/dL    Hematocrit 34 3 (L) 36 5 - 49 3 %    MCV 92 82 - 98 fL    MCH 31 8 26 8 - 34 3 pg    MCHC 34 7 31 4 - 37 4 g/dL    RDW 12 8 11 6 - 15 1 %    Platelets 997 373 - 435 Thousands/uL    MPV 11 1 8 9 - 12 7 fL   Basic metabolic panel   Result Value Ref Range    Sodium 131 (L) 136 - 145 mmol/L    Potassium 4 4 3 5 - 5 3 mmol/L    Chloride 98 (L) 100 - 108 mmol/L    CO2 24 21 - 32 mmol/L    ANION GAP 9 4 - 13 mmol/L    BUN 38 (H) 5 - 25 mg/dL    Creatinine 1 84 (H) 0 60 - 1 30 mg/dL    Glucose 598 (HH) 65 - 140 mg/dL    Calcium 8 6 8 3 - 10 1 mg/dL    eGFR 41 ml/min/1 73sq m   Hemoglobin A1C   Result Value Ref Range    Hemoglobin A1C 8 8 (H) 4 2 - 6 3 %     mg/dl   Comprehensive metabolic panel   Result Value Ref Range    Sodium 134 (L) 136 - 145 mmol/L    Potassium 4 4 3 5 - 5 3 mmol/L    Chloride 101 100 - 108 mmol/L    CO2 26 21 - 32 mmol/L    ANION GAP 7 4 - 13 mmol/L    BUN 32 (H) 5 - 25 mg/dL    Creatinine 1 53 (H) 0 60 - 1 30 mg/dL    Glucose 318 (H) 65 - 140 mg/dL    Calcium 8 9 8 3 - 10 1 mg/dL    AST 47 (H) 5 - 45 U/L     (H) 12 - 78 U/L    Alkaline Phosphatase 89 46 - 116 U/L    Total Protein 7 1 6 4 - 8 2 g/dL    Albumin 3 5 3 5 - 5 0 g/dL    Total Bilirubin 0 49 0 20 - 1 00 mg/dL    eGFR 51 ml/min/1 73sq m   CBC and differential   Result Value Ref Range    WBC 8 25 4 31 - 10 16 Thousand/uL    RBC 3 94 3 88 - 5 62 Million/uL    Hemoglobin 12 7 12 0 - 17 0 g/dL    Hematocrit 36 3 (L) 36 5 - 49 3 %    MCV 92 82 - 98 fL    MCH 32 2 26 8 - 34 3 pg    MCHC 35 0 31 4 - 37 4 g/dL    RDW 12 6 11 6 - 15 1 %    MPV 10 8 8 9 - 12 7 fL    Platelets 426 919 - 403 Thousands/uL    nRBC 0 /100 WBCs    Neutrophils Relative 77 (H) 43 - 75 %    Immat GRANS % 1 0 - 2 %    Lymphocytes Relative 17 14 - 44 %    Monocytes Relative 5 4 - 12 %    Eosinophils Relative 0 0 - 6 %    Basophils Relative 0 0 - 1 %    Neutrophils Absolute 6 38 1 85 - 7 62 Thousands/µL    Immature Grans Absolute 0 06 0 00 - 0 20 Thousand/uL    Lymphocytes Absolute 1 41 0 60 - 4 47 Thousands/µL    Monocytes Absolute 0 39 0 17 - 1 22 Thousand/µL    Eosinophils Absolute 0 00 0 00 - 0 61 Thousand/µL    Basophils Absolute 0 01 0 00 - 0 10 Thousands/µL   Basic metabolic panel   Result Value Ref Range    Sodium 135 (L) 136 - 145 mmol/L    Potassium 4 1 3 5 - 5 3 mmol/L    Chloride 103 100 - 108 mmol/L    CO2 26 21 - 32 mmol/L    ANION GAP 6 4 - 13 mmol/L    BUN 35 (H) 5 - 25 mg/dL    Creatinine 1 52 (H) 0 60 - 1 30 mg/dL    Glucose 158 (H) 65 - 140 mg/dL    Calcium 9 2 8 3 - 10 1 mg/dL    eGFR 51 ml/min/1 73sq m   Protime-INR   Result Value Ref Range    Protime 13 1 11 8 - 14 2 seconds    INR 0 98 0 86 - 1  17   UA (URINE) with reflex to Microscopic   Result Value Ref Range    Color, UA Yellow     Clarity, UA Clear     Specific Gravity, UA 1 017 1 003 - 1 030    pH, UA 5 5 4 5 - 8 0    Leukocytes, UA Trace (A) Negative    Nitrite, UA Negative Negative    Protein,  (2+) (A) Negative mg/dl    Glucose,  (1/10%) (A) Negative mg/dl    Ketones, UA Negative Negative mg/dl    Urobilinogen, UA 1 0 0 2, 1 0 E U /dl E U /dl    Bilirubin, UA Negative Negative    Blood, UA Small (A) Negative   Urine Microscopic   Result Value Ref Range    RBC, UA 2-4 (A) None Seen, 0-5 /hpf    WBC, UA 4-10 (A) None Seen, 0-5, 5-55, 5-65 /hpf    Epithelial Cells None Seen None Seen, Occasional /hpf    Bacteria, UA None Seen None Seen, Occasional /hpf    Hyaline Casts, UA None Seen None Seen /lpf   Fingerstick Glucose (POCT)   Result Value Ref Range    POC Glucose 315 (H) 65 - 140 mg/dl   Fingerstick Glucose (POCT)   Result Value Ref Range    POC Glucose 285 (H) 65 - 140 mg/dl   Fingerstick Glucose (POCT)   Result Value Ref Range    POC Glucose 264 (H) 65 - 140 mg/dl   Fingerstick Glucose (POCT)   Result Value Ref Range    POC Glucose 381 (H) 65 - 140 mg/dl   Fingerstick Glucose (POCT)   Result Value Ref Range    POC Glucose 424 (H) 65 - 140 mg/dl   Fingerstick Glucose (POCT)   Result Value Ref Range    POC Glucose >500 (HH) 65 - 140 mg/dl   Fingerstick Glucose (POCT)   Result Value Ref Range    POC Glucose 366 (H) 65 - 140 mg/dl   Fingerstick Glucose (POCT)   Result Value Ref Range    POC Glucose 341 (H) 65 - 140 mg/dl   Fingerstick Glucose (POCT)   Result Value Ref Range    POC Glucose 280 (H) 65 - 140 mg/dl   Fingerstick Glucose (POCT)   Result Value Ref Range    POC Glucose 346 (H) 65 - 140 mg/dl   Fingerstick Glucose (POCT)   Result Value Ref Range    POC Glucose >500 (HH) 65 - 140 mg/dl   Fingerstick Glucose (POCT)   Result Value Ref Range    POC Glucose >500 (HH) 65 - 140 mg/dl   Fingerstick Glucose (POCT)   Result Value Ref Range    POC Glucose >500 (HH) 65 - 140 mg/dl   Fingerstick Glucose (POCT)   Result Value Ref Range    POC Glucose 441 (H) 65 - 140 mg/dl   Fingerstick Glucose (POCT)   Result Value Ref Range    POC Glucose 322 (H) 65 - 140 mg/dl   Fingerstick Glucose (POCT)   Result Value Ref Range    POC Glucose 296 (H) 65 - 140 mg/dl   Fingerstick Glucose (POCT)   Result Value Ref Range    POC Glucose 183 (H) 65 - 140 mg/dl   Fingerstick Glucose (POCT)   Result Value Ref Range    POC Glucose 159 (H) 65 - 140 mg/dl   Fingerstick Glucose (POCT)   Result Value Ref Range    POC Glucose 178 (H) 65 - 140 mg/dl   Fingerstick Glucose (POCT)   Result Value Ref Range    POC Glucose 249 (H) 65 - 140 mg/dl   Fingerstick Glucose (POCT)   Result Value Ref Range    POC Glucose 268 (H) 65 - 140 mg/dl   Fingerstick Glucose (POCT)   Result Value Ref Range    POC Glucose 196 (H) 65 - 140 mg/dl   Fingerstick Glucose (POCT)   Result Value Ref Range    POC Glucose 308 (H) 65 - 140 mg/dl   Fingerstick Glucose (POCT)   Result Value Ref Range    POC Glucose 180 (H) 65 - 140 mg/dl   Fingerstick Glucose (POCT)   Result Value Ref Range    POC Glucose 394 (H) 65 - 140 mg/dl   Fingerstick Glucose (POCT)   Result Value Ref Range    POC Glucose 273 (H) 65 - 140 mg/dl   Fingerstick Glucose (POCT)   Result Value Ref Range    POC Glucose 295 (H) 65 - 140 mg/dl   Fingerstick Glucose (POCT)   Result Value Ref Range    POC Glucose 206 (H) 65 - 140 mg/dl   Fingerstick Glucose (POCT)   Result Value Ref Range    POC Glucose 131 65 - 140 mg/dl   Fingerstick Glucose (POCT)   Result Value Ref Range    POC Glucose 83 65 - 140 mg/dl   Fingerstick Glucose (POCT)   Result Value Ref Range    POC Glucose 205 (H) 65 - 140 mg/dl   Fingerstick Glucose (POCT)   Result Value Ref Range    POC Glucose 158 (H) 65 - 140 mg/dl   Fingerstick Glucose (POCT)   Result Value Ref Range    POC Glucose 115 65 - 140 mg/dl   Fingerstick Glucose (POCT)   Result Value Ref Range    POC Glucose 112 65 - 140 mg/dl       /77 (BP Location: Left arm, Patient Position: Sitting, Cuff Size: Large)   Pulse 88   Temp (!) 97 3 °F (36 3 °C)   Resp 14   Ht 6' 4" (1 93 m)   Wt 111 kg (244 lb)   BMI 29 70 kg/m²      Physical Exam   Constitutional: He is oriented to person, place, and time  He appears well-developed and well-nourished  HENT:   Head: Normocephalic  Right Ear: External ear normal    Left Ear: External ear normal    Nose: Nose normal    Mouth/Throat: Oropharynx is clear and moist  No oropharyngeal exudate  Eyes: Pupils are equal, round, and reactive to light  Conjunctivae and EOM are normal    Neck: Normal range of motion  Neck supple  No thyromegaly present  Cardiovascular: Normal rate, regular rhythm, normal heart sounds and intact distal pulses  Exam reveals no gallop and no friction rub  No murmur heard  Has edema on the left ankle some calf tenderness  S1-S2 no gallops regular rhythm       Pulmonary/Chest: Effort normal and breath sounds normal  No respiratory distress  He has no wheezes  He has no rales  Abdominal: Soft  Bowel sounds are normal  He exhibits no distension and no mass  There is no tenderness  There is no rebound and no guarding  Musculoskeletal: Normal range of motion  Lymphadenopathy:     He has no cervical adenopathy  Neurological: He is alert and oriented to person, place, and time  Skin: Skin is warm and dry  Sick liver lesions on the left side of the neck  Not involving the eye or the face he has a couple of bug bites on the right arm  Psychiatric: He has a normal mood and affect  His behavior is normal  Judgment normal    Nursing note and vitals reviewed

## 2024-09-30 NOTE — ED NOTES
Patient returned from PACU. Patient complain of pain 5 out of 10, tolerable and at pain goal. Patient has cast present on left lower extremity after incision and drainage. Wound Care consult placed by Orthopedic Surgery. PT/OT orders placed for patient. VSS.    Patient's wife came out of room talking on speaker phone  This RN walked in to see if she needed something  Wife did not acknowledge this RN  She continued to speak on phone  The person on the other end states "I don't understand, me and the other doctor were on the same page  This doctor is an idiot "  Patient's wife states "Yes he is, you know his name and you know the time"  This RN again asked if they needed anything, wife did not acknowledge this RN        Urszula Mckee, RN  01/07/23 6 [facility], RN  01/07/23 [id number]
